# Patient Record
Sex: FEMALE | Race: WHITE | NOT HISPANIC OR LATINO | Employment: OTHER | URBAN - NONMETROPOLITAN AREA
[De-identification: names, ages, dates, MRNs, and addresses within clinical notes are randomized per-mention and may not be internally consistent; named-entity substitution may affect disease eponyms.]

---

## 2017-01-06 ENCOUNTER — CLINICAL SUPPORT (OUTPATIENT)
Dept: OBSTETRICS AND GYNECOLOGY | Facility: CLINIC | Age: 62
End: 2017-01-06

## 2017-01-06 DIAGNOSIS — M81.0 OSTEOPOROSIS: Primary | ICD-10-CM

## 2017-01-06 PROCEDURE — 96372 THER/PROPH/DIAG INJ SC/IM: CPT | Performed by: OBSTETRICS & GYNECOLOGY

## 2017-01-10 ENCOUNTER — OFFICE VISIT (OUTPATIENT)
Dept: OTOLARYNGOLOGY | Facility: CLINIC | Age: 62
End: 2017-01-10

## 2017-01-10 VITALS
BODY MASS INDEX: 32.78 KG/M2 | HEART RATE: 100 BPM | DIASTOLIC BLOOD PRESSURE: 78 MMHG | TEMPERATURE: 98.1 F | SYSTOLIC BLOOD PRESSURE: 127 MMHG | HEIGHT: 66 IN | WEIGHT: 204 LBS

## 2017-01-10 DIAGNOSIS — H90.5 SENSORINEURAL HEARING LOSS: ICD-10-CM

## 2017-01-10 DIAGNOSIS — H69.83 EUSTACHIAN TUBE DYSFUNCTION, BILATERAL: Primary | ICD-10-CM

## 2017-01-10 PROBLEM — H69.90 EUSTACHIAN TUBE DYSFUNCTION: Status: ACTIVE | Noted: 2017-01-10

## 2017-01-10 PROBLEM — H69.80 EUSTACHIAN TUBE DYSFUNCTION: Status: ACTIVE | Noted: 2017-01-10

## 2017-01-10 PROCEDURE — 99213 OFFICE O/P EST LOW 20 MIN: CPT | Performed by: NURSE PRACTITIONER

## 2017-01-10 RX ORDER — VALACYCLOVIR HYDROCHLORIDE 500 MG/1
500 TABLET, FILM COATED ORAL DAILY
COMMUNITY
Start: 2016-10-20 | End: 2018-08-07 | Stop reason: SDUPTHER

## 2017-01-10 RX ORDER — TRAMADOL HYDROCHLORIDE 50 MG/1
TABLET ORAL
COMMUNITY
End: 2018-01-10 | Stop reason: SDUPTHER

## 2017-01-10 RX ORDER — TICAGRELOR 90 MG/1
90 TABLET ORAL 2 TIMES DAILY
COMMUNITY
Start: 2016-10-26 | End: 2019-03-13 | Stop reason: ALTCHOICE

## 2017-01-10 RX ORDER — SALSALATE 500 MG
TABLET ORAL
COMMUNITY
End: 2018-01-22 | Stop reason: HOSPADM

## 2017-01-10 RX ORDER — PREDNISONE 1 MG/1
2 TABLET ORAL DAILY
COMMUNITY
Start: 2016-10-26 | End: 2018-01-22 | Stop reason: HOSPADM

## 2017-01-10 RX ORDER — NITROGLYCERIN 0.4 MG/1
0.4 TABLET SUBLINGUAL
COMMUNITY
Start: 2016-03-09 | End: 2021-03-26 | Stop reason: HOSPADM

## 2017-01-10 RX ORDER — ISOSORBIDE MONONITRATE 30 MG/1
30 TABLET, EXTENDED RELEASE ORAL DAILY
COMMUNITY
Start: 2016-12-21 | End: 2018-01-22 | Stop reason: HOSPADM

## 2017-01-10 RX ORDER — CETIRIZINE HYDROCHLORIDE 10 MG/1
TABLET ORAL
COMMUNITY
End: 2020-08-12

## 2017-01-10 RX ORDER — LEVOTHYROXINE SODIUM 0.07 MG/1
TABLET ORAL
COMMUNITY
Start: 2014-05-15 | End: 2019-12-27 | Stop reason: DRUGHIGH

## 2017-01-10 RX ORDER — FLUTICASONE PROPIONATE 50 MCG
2 SPRAY, SUSPENSION (ML) NASAL DAILY
Qty: 1 EACH | Refills: 11 | Status: SHIPPED | OUTPATIENT
Start: 2017-01-10 | End: 2019-12-11 | Stop reason: SDUPTHER

## 2017-01-10 RX ORDER — ADALIMUMAB 40MG/0.8ML
40 KIT SUBCUTANEOUS TAKE AS DIRECTED
COMMUNITY
Start: 2016-11-30 | End: 2020-06-25

## 2017-01-10 RX ORDER — EZETIMIBE 10 MG/1
10 TABLET ORAL DAILY
COMMUNITY
Start: 2016-12-07 | End: 2020-05-21

## 2017-01-10 RX ORDER — FLUTICASONE PROPIONATE 50 MCG
2 SPRAY, SUSPENSION (ML) NASAL DAILY
COMMUNITY
End: 2017-01-10 | Stop reason: SDUPTHER

## 2017-01-10 RX ORDER — PANTOPRAZOLE SODIUM 40 MG/1
40 TABLET, DELAYED RELEASE ORAL DAILY
COMMUNITY
Start: 2016-10-24 | End: 2020-05-14

## 2017-01-10 RX ORDER — FUROSEMIDE 40 MG/1
TABLET ORAL
Status: ON HOLD | COMMUNITY
End: 2021-02-11

## 2017-01-10 RX ORDER — CARVEDILOL 6.25 MG/1
TABLET ORAL
COMMUNITY
End: 2018-01-10

## 2017-01-10 RX ORDER — LEFLUNOMIDE 20 MG/1
TABLET ORAL
COMMUNITY
End: 2018-01-22 | Stop reason: HOSPADM

## 2017-01-10 RX ORDER — FOLIC ACID 1 MG/1
TABLET ORAL
COMMUNITY
End: 2020-05-07

## 2017-01-10 RX ORDER — POTASSIUM CHLORIDE 750 MG/1
TABLET, FILM COATED, EXTENDED RELEASE ORAL
COMMUNITY
End: 2018-01-10 | Stop reason: ALTCHOICE

## 2017-01-10 NOTE — PROGRESS NOTES
PRIMARY CARE PROVIDER: Davon Urrutia MD  REFERRING PROVIDER: No ref. provider found      Chief complaint follow-up myringotomy tubes    Navarro Shin is a 61 y.o. female who presents status post myringotomy tube insertion. The patient currently has had no related complaints. The patient denies pain, fever, change of hearing and otorrhea. She states she has been using Flonase daily as well as Zyrtec with significant relief. She currently has a right PE tube and denies any left ear complications.     Review of Systems  HENT: as per HPI  CONSTITUTIONAL: No fever, chills  GI: no nausea or vomiting    History  Past Medical History   Diagnosis Date   • Arthritis    • Asthma    • Chronic sinusitis    • Eustachian tube dysfunction    • Heart disease    • Herpes simplex    • Hypertension    • Laryngitis sicca    • Laryngitis, chronic    • Otorrhea    • Sensorineural hearing loss    • Sjogren's disease      Past Surgical History   Procedure Laterality Date   • Coronary angioplasty with stent placement     • Myringotomy w/ tubes  09/04/2014     Family History   Problem Relation Age of Onset   • Cancer Mother    • Heart disease Father      Social History   Substance Use Topics   • Smoking status: Never Smoker   • Smokeless tobacco: Never Used   • Alcohol use No       Current Outpatient Prescriptions:   •  aspirin 81 MG tablet, Take 81 mg by mouth daily., Disp: , Rfl:   •  BRILINTA 90 MG tablet tablet, , Disp: , Rfl:   •  carvedilol (COREG) 6.25 MG tablet, Take 6.25 mg by mouth 2 times daily (with meals), Disp: , Rfl:   •  cetirizine (zyrTEC) 10 MG tablet, Take 10 mg by mouth daily , Disp: , Rfl:   •  fluticasone (FLONASE) 50 MCG/ACT nasal spray, 2 sprays into each nostril Daily., Disp: 1 each, Rfl: 11  •  folic acid (FOLVITE) 1 MG tablet, Take 1 mg by mouth daily.  , Disp: , Rfl:   •  furosemide (LASIX) 40 MG tablet, Take 40 mg by mouth daily., Disp: , Rfl:   •  HUMIRA PEN 40 MG/0.8ML Pen-injector Kit, ,  Disp: , Rfl:   •  isosorbide mononitrate (IMDUR) 30 MG 24 hr tablet, , Disp: , Rfl:   •  leflunomide (ARAVA) 20 MG tablet, Take 20 mg by mouth daily.  , Disp: , Rfl:   •  levothyroxine (SYNTHROID, LEVOTHROID) 75 MCG tablet, Take 75 mcg by mouth daily., Disp: , Rfl:   •  LYRICA 50 MG capsule, , Disp: , Rfl:   •  MULTIPLE VITAMIN PO, Take 1 tablet by mouth daily.  , Disp: , Rfl:   •  nitroglycerin (NITROSTAT) 0.4 MG SL tablet, Place 1 tablet under the tongue every 5 minutes as needed for Chest pain, Disp: , Rfl:   •  pantoprazole (PROTONIX) 40 MG EC tablet, , Disp: , Rfl:   •  potassium chloride (K-DUR) 10 MEQ CR tablet, Take 30 mEq by mouth 2 times daily., Disp: , Rfl:   •  predniSONE (DELTASONE) 1 MG tablet, , Disp: , Rfl:   •  PROLIA 60 MG/ML solution syringe, INJECT 60MG SUBCUTANEOUSLY EVERY 6 MONTHS, Disp: 1 mL, Rfl: 0  •  salsalate (DISALCID) 500 MG tablet, Takes 2 TAB BID ON Tuesday, Thursday and Saturday and 3 TABS IN AM & 2 TABS AT NIGHT  on Monday, Wednesday, Friday and Sunday., Disp: , Rfl:   •  traMADol (ULTRAM) 50 MG tablet, Take 50 mg by mouth as needed for Pain., Disp: , Rfl:   •  valACYclovir (VALTREX) 500 MG tablet, , Disp: , Rfl:   •  ZETIA 10 MG tablet, , Disp: , Rfl:   Allergies:  Amoxicillin    Objective     Vital Signs  Temp:  [98.1 °F (36.7 °C)] 98.1 °F (36.7 °C)  Heart Rate:  [100] 100  BP: (127)/(78) 127/78    Physical Exam:  CONSTITUTIONAL: well nourished, well-developed, alert, oriented, in no acute distress   COMMUNICATION AND VOICE: able to communicate normally for age, normal voice quality  HEAD: normocephalic, no lesions, atraumatic, no tenderness, no masses   FACE: appearance normal, no lesions, no tenderness, no deformities, facial motion symmetric  EYES: ocular motility normal, eyelids normal, orbits normal, no proptosis, conjunctiva normal , pupils equal, round   EARS:  Hearing: response to conversational voice normal bilaterally   External Ears: auricles without lesions  Otoscopic:  ear canals normal, right myringotomy tube in place, dry and patent and with cerumen at the base of the tube; left ear canal normal, no lesion; TM intact with monomer no effusion  NOSE:  External Nose: structure normal, no tenderness on palpation, no nasal discharge, no lesions, no evidence of trauma, nostrils patent   ORAL:  Lips: upper and lower lips without lesion   NECK: neck appearance normal  CHEST/RESPIRATORY: respiratory effort normal, normal chest excursion  CARDIOVASCULAR: extremities without cyanosis or edema   NEUROLOGIC/PSYCHIATRIC: oriented appropriately, mood normal, affect appropriate, CN II-XII intact grossly    Assessment   1. Eustachian tube dysfunction, bilateral    2. Sensorineural hearing loss    s/p myringotomy tube insertion      Plan    Dry ear precautions. Continue use of nasal spray and Zyrtec.     I discussed the patients findings and my recommendations and answered questions. Follow-up sooner if needed.     Follow up in 6 months.    Jayla Jo, APRN   01/10/17  2:58 PM

## 2017-01-10 NOTE — MR AVS SNAPSHOT
Tawny Starkeyburn   1/10/2017 2:30 PM   Office Visit    Dept Phone:  [phone]   Encounter #:  [id number]    Provider:  DANIELA Mcgill   Department:  [facility]                Your Full Care Plan              Where to Get Your Medications      These medications were sent to [facility] - [place], [place] - [address] - [phone] [place] [phone] FX  [address], [facility] [address]     Phone:  [phone]     fluticasone 50 MCG/ACT nasal spray            Your Updated Medication List          This list is accurate as of: 1/10/17  4:55 PM.  Always use your most recent med list.                ARAVA 20 MG tablet   Generic drug:  leflunomide       aspirin 81 MG tablet       BRILINTA 90 MG tablet tablet   Generic drug:  ticagrelor       carvedilol 6.25 MG tablet   Commonly known as:  COREG       cetirizine 10 MG tablet   Commonly known as:  zyrTEC       fluticasone 50 MCG/ACT nasal spray   Commonly known as:  FLONASE   2 sprays into each nostril Daily.       folic acid 1 MG tablet   Commonly known as:  FOLVITE       HUMIRA PEN 40 MG/0.8ML Pen-injector Kit   Generic drug:  Adalimumab       isosorbide mononitrate 30 MG 24 hr tablet   Commonly known as:  IMDUR       LASIX 40 MG tablet   Generic drug:  furosemide       levothyroxine 75 MCG tablet   Commonly known as:  SYNTHROID, LEVOTHROID       LYRICA 50 MG capsule   Generic drug:  pregabalin       MULTIPLE VITAMIN PO       NITROSTAT 0.4 MG SL tablet   Generic drug:  nitroglycerin       pantoprazole 40 MG EC tablet   Commonly known as:  PROTONIX       potassium chloride 10 MEQ CR tablet   Commonly known as:  K-DUR       predniSONE 1 MG tablet   Commonly known as:  DELTASONE       PROLIA 60 MG/ML solution syringe   Generic drug:  denosumab   INJECT 60MG SUBCUTANEOUSLY EVERY 6 MONTHS       salsalate 500 MG tablet   Commonly known as:  DISALCID       traMADol 50 MG tablet    "  Commonly known as:  ULTRAM       valACYclovir 500 MG tablet   Commonly known as:  VALTREX       ZETIA 10 MG tablet   Generic drug:  ezetimibe               You Were Diagnosed With        Codes Comments    Eustachian tube dysfunction, bilateral    -  Primary ICD-10-CM: H69.83  ICD-9-CM: 381.81     Sensorineural hearing loss     ICD-10-CM: H90.5  ICD-9-CM: 389.10       Instructions     None    Patient Instructions History      Upcoming Appointments     Visit Type Date Time Department    FOLLOW UP 1/10/2017  2:30 PM [facility] [place]    FOLLOW UP 2017  9:30 AM [facility] Signup     [facility] [employer] allows you to send messages to your doctor, view your test results, renew your prescriptions, schedule appointments, and more. To sign up, go to [employer] and click on the Sign Up Now link in the New User? box. Enter your [employer] Activation Code exactly as it appears below along with the last four digits of your Social Security Number and your Date of Birth () to complete the sign-up process. If you do not sign up before the expiration date, you must request a new code.    [employer] Activation Code: [email or web address]  Expires: 2017  9:59 AM    If you have questions, you can email [email or web address] or call [phone] to talk to our [employer] staff. Remember, [employer] is NOT to be used for urgent needs. For medical emergencies, dial 911.               Other Info from Your Visit           Your Appointments     2017  9:30 AM CDT   Follow Up with DANIELA Mcgill   [facility] (--)    [address] [place] [address]   [address]  [facility] [address] [phone]           Arrive 15 minutes prior to appointment.              Allergies     Amoxicillin        Reason for Visit     Ear Problem 4 month ear follow up      Vital Signs     Blood Pressure Pulse Temperature Height Weight Body Mass Index    127/78 100 98.1 °F (36.7 °C) 66\" (167.6 cm) 204 " lb (92.5 kg) 32.93 kg/m2    Smoking Status                   Never Smoker           Problems and Diagnoses Noted     Eustachian tube dysfunction    Hearing disorder

## 2017-01-13 DIAGNOSIS — I25.10 CORONARY ARTERY DISEASE INVOLVING NATIVE HEART WITHOUT ANGINA PECTORIS, UNSPECIFIED VESSEL OR LESION TYPE: ICD-10-CM

## 2017-01-13 RX ORDER — TICAGRELOR 90 MG/1
TABLET ORAL
Qty: 180 TABLET | Refills: 3 | Status: SHIPPED | OUTPATIENT
Start: 2017-01-13 | End: 2018-02-08 | Stop reason: SDUPTHER

## 2017-02-28 DIAGNOSIS — I20.9 ANGINA PECTORIS (HCC): ICD-10-CM

## 2017-02-28 RX ORDER — ISOSORBIDE MONONITRATE 30 MG/1
TABLET, EXTENDED RELEASE ORAL
Qty: 90 TABLET | Refills: 3 | Status: ON HOLD | OUTPATIENT
Start: 2017-02-28 | End: 2018-01-22

## 2017-03-27 ENCOUNTER — TRANSCRIBE ORDERS (OUTPATIENT)
Dept: ADMINISTRATIVE | Facility: HOSPITAL | Age: 62
End: 2017-03-27

## 2017-03-27 ENCOUNTER — APPOINTMENT (OUTPATIENT)
Dept: LAB | Facility: HOSPITAL | Age: 62
End: 2017-03-27
Attending: INTERNAL MEDICINE

## 2017-03-27 DIAGNOSIS — I10 BENIGN HYPERTENSION: ICD-10-CM

## 2017-03-27 DIAGNOSIS — E78.5 HYPERLIPIDEMIA, UNSPECIFIED HYPERLIPIDEMIA TYPE: ICD-10-CM

## 2017-03-27 DIAGNOSIS — M81.0 OSTEOPOROSIS, UNSPECIFIED: Primary | ICD-10-CM

## 2017-03-27 DIAGNOSIS — Z79.899 LONG TERM USE OF DRUG: ICD-10-CM

## 2017-03-27 LAB
25(OH)D3 SERPL-MCNC: 27.9 NG/ML (ref 30–100)
ALBUMIN SERPL-MCNC: 3.4 G/DL (ref 3.5–5)
ALBUMIN/GLOB SERPL: 0.9 G/DL (ref 1.1–2.5)
ALP SERPL-CCNC: 81 U/L (ref 24–120)
ALT SERPL W P-5'-P-CCNC: 25 U/L (ref 0–54)
ANION GAP SERPL CALCULATED.3IONS-SCNC: 6 MMOL/L (ref 4–13)
ARTICHOKE IGE QN: 141 MG/DL (ref 0–99)
AST SERPL-CCNC: 40 U/L (ref 7–45)
BASOPHILS # BLD AUTO: 0.04 10*3/MM3 (ref 0–0.2)
BASOPHILS NFR BLD AUTO: 0.7 % (ref 0–2)
BILIRUB SERPL-MCNC: 0.5 MG/DL (ref 0.1–1)
BILIRUB UR QL STRIP: NEGATIVE
BUN BLD-MCNC: 12 MG/DL (ref 5–21)
BUN/CREAT SERPL: 15.4 (ref 7–25)
CALCIUM SPEC-SCNC: 8.9 MG/DL (ref 8.4–10.4)
CHLORIDE SERPL-SCNC: 104 MMOL/L (ref 98–110)
CHOLEST SERPL-MCNC: 221 MG/DL (ref 130–200)
CLARITY UR: CLEAR
CO2 SERPL-SCNC: 32 MMOL/L (ref 24–31)
COLOR UR: YELLOW
CREAT BLD-MCNC: 0.78 MG/DL (ref 0.5–1.4)
DEPRECATED RDW RBC AUTO: 55.7 FL (ref 40–54)
EOSINOPHIL # BLD AUTO: 0.29 10*3/MM3 (ref 0–0.7)
EOSINOPHIL NFR BLD AUTO: 5.1 % (ref 0–4)
ERYTHROCYTE [DISTWIDTH] IN BLOOD BY AUTOMATED COUNT: 16.5 % (ref 12–15)
GFR SERPL CREATININE-BSD FRML MDRD: 75 ML/MIN/1.73
GLOBULIN UR ELPH-MCNC: 3.6 GM/DL
GLUCOSE BLD-MCNC: 95 MG/DL (ref 70–100)
GLUCOSE UR STRIP-MCNC: NEGATIVE MG/DL
HCT VFR BLD AUTO: 40 % (ref 37–47)
HDLC SERPL-MCNC: 42 MG/DL
HGB BLD-MCNC: 12.7 G/DL (ref 12–16)
HGB UR QL STRIP.AUTO: NEGATIVE
IMM GRANULOCYTES # BLD: 0.01 10*3/MM3 (ref 0–0.03)
IMM GRANULOCYTES NFR BLD: 0.2 % (ref 0–5)
KETONES UR QL STRIP: NEGATIVE
LDLC/HDLC SERPL: 3.58 {RATIO}
LEUKOCYTE ESTERASE UR QL STRIP.AUTO: NEGATIVE
LYMPHOCYTES # BLD AUTO: 1.82 10*3/MM3 (ref 0.72–4.86)
LYMPHOCYTES NFR BLD AUTO: 32.2 % (ref 15–45)
MAGNESIUM SERPL-MCNC: 2 MG/DL (ref 1.4–2.2)
MCH RBC QN AUTO: 29.1 PG (ref 28–32)
MCHC RBC AUTO-ENTMCNC: 31.8 G/DL (ref 33–36)
MCV RBC AUTO: 91.7 FL (ref 82–98)
MONOCYTES # BLD AUTO: 0.79 10*3/MM3 (ref 0.19–1.3)
MONOCYTES NFR BLD AUTO: 14 % (ref 4–12)
NEUTROPHILS # BLD AUTO: 2.7 10*3/MM3 (ref 1.87–8.4)
NEUTROPHILS NFR BLD AUTO: 47.8 % (ref 39–78)
NITRITE UR QL STRIP: NEGATIVE
PH UR STRIP.AUTO: <=5 [PH] (ref 5–8)
PLATELET # BLD AUTO: 161 10*3/MM3 (ref 130–400)
PMV BLD AUTO: 10.3 FL (ref 6–12)
POTASSIUM BLD-SCNC: 4.2 MMOL/L (ref 3.5–5.3)
PROT SERPL-MCNC: 7 G/DL (ref 6.3–8.7)
PROT UR QL STRIP: NEGATIVE
RBC # BLD AUTO: 4.36 10*6/MM3 (ref 4.2–5.4)
SODIUM BLD-SCNC: 142 MMOL/L (ref 135–145)
SP GR UR STRIP: 1.01 (ref 1–1.03)
T4 FREE SERPL-MCNC: 1.4 NG/DL (ref 0.78–2.19)
TRIGL SERPL-MCNC: 143 MG/DL (ref 0–149)
TSH SERPL DL<=0.05 MIU/L-ACNC: 0.82 MIU/ML (ref 0.47–4.68)
URATE SERPL-MCNC: 3.6 MG/DL (ref 2.7–7.5)
UROBILINOGEN UR QL STRIP: NORMAL
WBC NRBC COR # BLD: 5.65 10*3/MM3 (ref 4.8–10.8)

## 2017-03-27 PROCEDURE — 83735 ASSAY OF MAGNESIUM: CPT | Performed by: INTERNAL MEDICINE

## 2017-03-27 PROCEDURE — 80061 LIPID PANEL: CPT | Performed by: INTERNAL MEDICINE

## 2017-03-27 PROCEDURE — 84443 ASSAY THYROID STIM HORMONE: CPT | Performed by: INTERNAL MEDICINE

## 2017-03-27 PROCEDURE — 80053 COMPREHEN METABOLIC PANEL: CPT | Performed by: INTERNAL MEDICINE

## 2017-03-27 PROCEDURE — 82306 VITAMIN D 25 HYDROXY: CPT | Performed by: INTERNAL MEDICINE

## 2017-03-27 PROCEDURE — 36415 COLL VENOUS BLD VENIPUNCTURE: CPT | Performed by: INTERNAL MEDICINE

## 2017-03-27 PROCEDURE — 81003 URINALYSIS AUTO W/O SCOPE: CPT | Performed by: INTERNAL MEDICINE

## 2017-03-27 PROCEDURE — 84439 ASSAY OF FREE THYROXINE: CPT | Performed by: INTERNAL MEDICINE

## 2017-03-27 PROCEDURE — 84550 ASSAY OF BLOOD/URIC ACID: CPT | Performed by: INTERNAL MEDICINE

## 2017-03-27 PROCEDURE — 85025 COMPLETE CBC W/AUTO DIFF WBC: CPT | Performed by: INTERNAL MEDICINE

## 2017-03-30 ENCOUNTER — OFFICE VISIT (OUTPATIENT)
Dept: CARDIOLOGY | Age: 62
End: 2017-03-30
Payer: MEDICARE

## 2017-03-30 VITALS
DIASTOLIC BLOOD PRESSURE: 66 MMHG | BODY MASS INDEX: 32.95 KG/M2 | HEIGHT: 66 IN | HEART RATE: 74 BPM | WEIGHT: 205 LBS | SYSTOLIC BLOOD PRESSURE: 116 MMHG

## 2017-03-30 DIAGNOSIS — R55 NEAR SYNCOPE: Primary | ICD-10-CM

## 2017-03-30 DIAGNOSIS — Z95.0 PACEMAKER: ICD-10-CM

## 2017-03-30 DIAGNOSIS — I49.5 SINUS NODE DYSFUNCTION (HCC): ICD-10-CM

## 2017-03-30 PROCEDURE — G8427 DOCREV CUR MEDS BY ELIG CLIN: HCPCS | Performed by: CLINICAL NURSE SPECIALIST

## 2017-03-30 PROCEDURE — 3014F SCREEN MAMMO DOC REV: CPT | Performed by: CLINICAL NURSE SPECIALIST

## 2017-03-30 PROCEDURE — G8598 ASA/ANTIPLAT THER USED: HCPCS | Performed by: CLINICAL NURSE SPECIALIST

## 2017-03-30 PROCEDURE — G8484 FLU IMMUNIZE NO ADMIN: HCPCS | Performed by: CLINICAL NURSE SPECIALIST

## 2017-03-30 PROCEDURE — G8417 CALC BMI ABV UP PARAM F/U: HCPCS | Performed by: CLINICAL NURSE SPECIALIST

## 2017-03-30 PROCEDURE — 99213 OFFICE O/P EST LOW 20 MIN: CPT | Performed by: CLINICAL NURSE SPECIALIST

## 2017-03-30 PROCEDURE — 93288 INTERROG EVL PM/LDLS PM IP: CPT | Performed by: CLINICAL NURSE SPECIALIST

## 2017-03-30 PROCEDURE — 1036F TOBACCO NON-USER: CPT | Performed by: CLINICAL NURSE SPECIALIST

## 2017-03-30 PROCEDURE — 3017F COLORECTAL CA SCREEN DOC REV: CPT | Performed by: CLINICAL NURSE SPECIALIST

## 2017-04-27 ENCOUNTER — TELEPHONE (OUTPATIENT)
Dept: NEUROLOGY | Facility: CLINIC | Age: 62
End: 2017-04-27

## 2017-04-27 DIAGNOSIS — M54.50 LOW BACK PAIN RADIATING TO BOTH LEGS: Primary | ICD-10-CM

## 2017-04-27 DIAGNOSIS — M79.604 LOW BACK PAIN RADIATING TO BOTH LEGS: Primary | ICD-10-CM

## 2017-04-27 DIAGNOSIS — M79.605 LOW BACK PAIN RADIATING TO BOTH LEGS: Primary | ICD-10-CM

## 2017-04-28 ENCOUNTER — HOSPITAL ENCOUNTER (OUTPATIENT)
Dept: MRI IMAGING | Facility: HOSPITAL | Age: 62
Discharge: HOME | End: 2017-04-28
Attending: PSYCHIATRY & NEUROLOGY | Admitting: PSYCHIATRY & NEUROLOGY

## 2017-04-28 DIAGNOSIS — M79.605 LOW BACK PAIN RADIATING TO BOTH LEGS: ICD-10-CM

## 2017-04-28 DIAGNOSIS — M54.50 LOW BACK PAIN RADIATING TO BOTH LEGS: ICD-10-CM

## 2017-04-28 DIAGNOSIS — M79.604 LOW BACK PAIN RADIATING TO BOTH LEGS: ICD-10-CM

## 2017-04-28 PROCEDURE — 72148 MRI LUMBAR SPINE W/O DYE: CPT

## 2017-04-28 NOTE — TELEPHONE ENCOUNTER
I was able to reach Mrs. Shin and confirmed her insurance. She would like to get her MRI done today if possible.

## 2017-07-13 DIAGNOSIS — I10 ESSENTIAL HYPERTENSION: ICD-10-CM

## 2017-07-13 RX ORDER — CARVEDILOL 12.5 MG/1
TABLET ORAL
Qty: 180 TABLET | Refills: 3 | Status: SHIPPED | OUTPATIENT
Start: 2017-07-13 | End: 2018-05-02 | Stop reason: SDUPTHER

## 2017-07-31 ENCOUNTER — TELEPHONE (OUTPATIENT)
Dept: CARDIOLOGY | Age: 62
End: 2017-07-31

## 2017-08-02 ENCOUNTER — OFFICE VISIT (OUTPATIENT)
Dept: CARDIOLOGY | Age: 62
End: 2017-08-02
Payer: MEDICARE

## 2017-08-02 VITALS
BODY MASS INDEX: 33.11 KG/M2 | DIASTOLIC BLOOD PRESSURE: 62 MMHG | SYSTOLIC BLOOD PRESSURE: 110 MMHG | HEIGHT: 66 IN | HEART RATE: 72 BPM | WEIGHT: 206 LBS

## 2017-08-02 DIAGNOSIS — I25.10 ASHD (ARTERIOSCLEROTIC HEART DISEASE): ICD-10-CM

## 2017-08-02 DIAGNOSIS — Z95.0 PACEMAKER: ICD-10-CM

## 2017-08-02 DIAGNOSIS — R07.9 CHEST PAIN IN ADULT: Primary | ICD-10-CM

## 2017-08-02 DIAGNOSIS — R06.02 SHORTNESS OF BREATH: ICD-10-CM

## 2017-08-02 DIAGNOSIS — R12 HEARTBURN: ICD-10-CM

## 2017-08-02 DIAGNOSIS — I49.5 SINUS NODE DYSFUNCTION (HCC): ICD-10-CM

## 2017-08-02 PROCEDURE — 99214 OFFICE O/P EST MOD 30 MIN: CPT | Performed by: INTERNAL MEDICINE

## 2017-08-02 PROCEDURE — 3014F SCREEN MAMMO DOC REV: CPT | Performed by: INTERNAL MEDICINE

## 2017-08-02 PROCEDURE — 93280 PM DEVICE PROGR EVAL DUAL: CPT | Performed by: INTERNAL MEDICINE

## 2017-08-02 PROCEDURE — G8598 ASA/ANTIPLAT THER USED: HCPCS | Performed by: INTERNAL MEDICINE

## 2017-08-02 PROCEDURE — 3017F COLORECTAL CA SCREEN DOC REV: CPT | Performed by: INTERNAL MEDICINE

## 2017-08-02 PROCEDURE — G8417 CALC BMI ABV UP PARAM F/U: HCPCS | Performed by: INTERNAL MEDICINE

## 2017-08-02 PROCEDURE — 1036F TOBACCO NON-USER: CPT | Performed by: INTERNAL MEDICINE

## 2017-08-02 PROCEDURE — G8427 DOCREV CUR MEDS BY ELIG CLIN: HCPCS | Performed by: INTERNAL MEDICINE

## 2017-08-02 RX ORDER — CLONIDINE HYDROCHLORIDE 0.1 MG/1
0.1 TABLET ORAL 2 TIMES DAILY PRN
Status: ON HOLD | COMMUNITY
Start: 2017-06-29 | End: 2021-04-12 | Stop reason: HOSPADM

## 2017-08-02 RX ORDER — GABAPENTIN 600 MG/1
TABLET ORAL
Status: ON HOLD | COMMUNITY
Start: 2017-05-24 | End: 2018-01-22

## 2017-08-15 ENCOUNTER — CLINICAL DOCUMENTATION (OUTPATIENT)
Dept: CARDIOLOGY | Age: 62
End: 2017-08-15

## 2017-08-15 ENCOUNTER — HOSPITAL ENCOUNTER (OUTPATIENT)
Dept: NUCLEAR MEDICINE | Age: 62
Discharge: HOME | End: 2017-08-15
Payer: MEDICARE

## 2017-08-15 ENCOUNTER — HOSPITAL ENCOUNTER (OUTPATIENT)
Dept: NON INVASIVE DIAGNOSTICS | Age: 62
Discharge: HOME | End: 2017-08-15
Payer: MEDICARE

## 2017-08-15 DIAGNOSIS — R12 HEARTBURN: ICD-10-CM

## 2017-08-15 DIAGNOSIS — I25.10 ASHD (ARTERIOSCLEROTIC HEART DISEASE): ICD-10-CM

## 2017-08-15 DIAGNOSIS — R07.9 CHEST PAIN IN ADULT: ICD-10-CM

## 2017-08-15 DIAGNOSIS — R06.02 SHORTNESS OF BREATH: ICD-10-CM

## 2017-08-15 PROCEDURE — 93017 CV STRESS TEST TRACING ONLY: CPT

## 2017-08-15 PROCEDURE — 3430000000 HC RX DIAGNOSTIC RADIOPHARMACEUTICAL: Performed by: INTERNAL MEDICINE

## 2017-08-15 PROCEDURE — 6360000002 HC RX W HCPCS: Performed by: INTERNAL MEDICINE

## 2017-08-15 PROCEDURE — 78452 HT MUSCLE IMAGE SPECT MULT: CPT

## 2017-08-15 PROCEDURE — A9500 TC99M SESTAMIBI: HCPCS | Performed by: INTERNAL MEDICINE

## 2017-08-15 RX ADMIN — TETRAKIS(2-METHOXYISOBUTYLISOCYANIDE)COPPER(I) TETRAFLUOROBORATE 10 MILLICURIE: 1 INJECTION, POWDER, LYOPHILIZED, FOR SOLUTION INTRAVENOUS at 10:38

## 2017-08-15 RX ADMIN — TETRAKIS(2-METHOXYISOBUTYLISOCYANIDE)COPPER(I) TETRAFLUOROBORATE 30 MILLICURIE: 1 INJECTION, POWDER, LYOPHILIZED, FOR SOLUTION INTRAVENOUS at 10:39

## 2017-08-15 RX ADMIN — REGADENOSON 0.4 MG: 0.08 INJECTION, SOLUTION INTRAVENOUS at 10:39

## 2017-09-25 ENCOUNTER — TRANSCRIBE ORDERS (OUTPATIENT)
Dept: ADMINISTRATIVE | Facility: HOSPITAL | Age: 62
End: 2017-09-25

## 2017-09-25 ENCOUNTER — APPOINTMENT (OUTPATIENT)
Dept: LAB | Facility: HOSPITAL | Age: 62
End: 2017-09-25
Attending: INTERNAL MEDICINE

## 2017-09-25 DIAGNOSIS — E78.5 HYPERLIPIDEMIA, UNSPECIFIED HYPERLIPIDEMIA TYPE: ICD-10-CM

## 2017-09-25 DIAGNOSIS — Z11.59 NEED FOR HEPATITIS C SCREENING TEST: ICD-10-CM

## 2017-09-25 DIAGNOSIS — I10 BENIGN HYPERTENSION: Primary | ICD-10-CM

## 2017-09-25 LAB
ANION GAP SERPL CALCULATED.3IONS-SCNC: 7 MMOL/L (ref 4–13)
ARTICHOKE IGE QN: 159 MG/DL (ref 0–99)
AST SERPL-CCNC: 34 U/L (ref 7–45)
BUN BLD-MCNC: 17 MG/DL (ref 5–21)
BUN/CREAT SERPL: 22.7 (ref 7–25)
CALCIUM SPEC-SCNC: 9 MG/DL (ref 8.4–10.4)
CHLORIDE SERPL-SCNC: 105 MMOL/L (ref 98–110)
CHOLEST SERPL-MCNC: 216 MG/DL (ref 130–200)
CO2 SERPL-SCNC: 28 MMOL/L (ref 24–31)
CREAT BLD-MCNC: 0.75 MG/DL (ref 0.5–1.4)
GFR SERPL CREATININE-BSD FRML MDRD: 78 ML/MIN/1.73
GLUCOSE BLD-MCNC: 91 MG/DL (ref 70–100)
HCV AB SER DONR QL: NEGATIVE
HCV S/C RATIO: 0.03 (ref 0–0.99)
HDLC SERPL-MCNC: 48 MG/DL
LDLC/HDLC SERPL: 3 {RATIO}
POTASSIUM BLD-SCNC: 3.4 MMOL/L (ref 3.5–5.3)
SODIUM BLD-SCNC: 140 MMOL/L (ref 135–145)
TRIGL SERPL-MCNC: 120 MG/DL (ref 0–149)

## 2017-09-25 PROCEDURE — 86803 HEPATITIS C AB TEST: CPT | Performed by: INTERNAL MEDICINE

## 2017-09-25 PROCEDURE — 84450 TRANSFERASE (AST) (SGOT): CPT | Performed by: INTERNAL MEDICINE

## 2017-09-25 PROCEDURE — 80048 BASIC METABOLIC PNL TOTAL CA: CPT | Performed by: INTERNAL MEDICINE

## 2017-09-25 PROCEDURE — 36415 COLL VENOUS BLD VENIPUNCTURE: CPT

## 2017-09-25 PROCEDURE — 80061 LIPID PANEL: CPT | Performed by: INTERNAL MEDICINE

## 2017-10-25 ENCOUNTER — OFFICE VISIT (OUTPATIENT)
Dept: OBSTETRICS AND GYNECOLOGY | Facility: CLINIC | Age: 62
End: 2017-10-25

## 2017-10-25 VITALS
BODY MASS INDEX: 32.78 KG/M2 | WEIGHT: 204 LBS | DIASTOLIC BLOOD PRESSURE: 70 MMHG | HEIGHT: 66 IN | SYSTOLIC BLOOD PRESSURE: 128 MMHG

## 2017-10-25 DIAGNOSIS — Z01.411 ENCOUNTER FOR GYNECOLOGICAL EXAMINATION WITH ABNORMAL FINDING: ICD-10-CM

## 2017-10-25 DIAGNOSIS — N89.8 VAGINAL DRYNESS: Primary | ICD-10-CM

## 2017-10-25 DIAGNOSIS — M81.0 OSTEOPOROSIS, UNSPECIFIED OSTEOPOROSIS TYPE, UNSPECIFIED PATHOLOGICAL FRACTURE PRESENCE: ICD-10-CM

## 2017-10-25 DIAGNOSIS — Z12.31 ENCOUNTER FOR SCREENING MAMMOGRAM FOR BREAST CANCER: ICD-10-CM

## 2017-10-25 DIAGNOSIS — Z78.0 MENOPAUSE: ICD-10-CM

## 2017-10-25 PROCEDURE — 96372 THER/PROPH/DIAG INJ SC/IM: CPT | Performed by: OBSTETRICS & GYNECOLOGY

## 2017-10-25 PROCEDURE — G0101 CA SCREEN;PELVIC/BREAST EXAM: HCPCS | Performed by: OBSTETRICS & GYNECOLOGY

## 2017-10-25 RX ORDER — AMITRIPTYLINE HYDROCHLORIDE 25 MG/1
25 TABLET, FILM COATED ORAL NIGHTLY
Qty: 30 TABLET | Refills: 11 | Status: SHIPPED | OUTPATIENT
Start: 2017-10-25 | End: 2018-01-10

## 2017-10-25 NOTE — PROGRESS NOTES
Subjective   Chief Complaint   Patient presents with   • Annual Exam     pt here today for annual exam and prolia injection. pt voices no other concerns.      Tawny Shin is a 62 y.o. year old No obstetric history on file. menopausal female presenting to be seen for her annual exam.  The patient denies any vaginal bleeding in the past 12 months. Hot flashes and night sweats ARE a significant problem again, after having about 5 years that she did not experience these symptoms.  Her biggest complaint is back pain; Dr. Oropeza is evaluating her and they may plan surgery.     SEXUAL Hx:  She is sexually active.  In the past year there has not been new sexual partners.  She does report dryness and pain with intercourse.  She has never taken HRT because cardiology did not want her to after she had cardiac stents placed.    HEALTH Hx:  She exercises regularly: no.  She is very limited by back pain.    The patient reports regular self breast exams: no  She has noticed changes in height: yes.    No Additional Complaints Reported    The following portions of the patient's history were reviewed and updated as appropriate:problem list, current medications, allergies, past family history, past medical history, past social history and past surgical history.    Smoking status: Never Smoker                                                              Smokeless status: Never Used                        Review of Systems   Eyes: Positive for visual disturbance (wears glasses).   Respiratory: Negative for shortness of breath.    Cardiovascular: Negative for chest pain.   Gastrointestinal: Positive for diarrhea (side-effect of several medications patient takes). Negative for anal bleeding and blood in stool.   Endocrine: Positive for heat intolerance (hot flashes and night sweats).   Genitourinary: Positive for dyspareunia. Negative for difficulty urinating, enuresis, pelvic pain, vaginal bleeding and vaginal discharge.        Pt c/o  "odor for past 1-2 months   Musculoskeletal: Positive for arthralgias and back pain.   Skin: Negative for rash.   Neurological: Positive for dizziness.   Psychiatric/Behavioral: Positive for sleep disturbance (due to back pain).         Objective   /70  Ht 66\" (167.6 cm)  Wt 204 lb (92.5 kg)  BMI 32.93 kg/m2  Physical Exam   Constitutional: She is oriented to person, place, and time. She appears well-developed and well-nourished. No distress.   HENT:   Head: Normocephalic and atraumatic.   Eyes: EOM are normal.   Neck: Normal range of motion. Neck supple. No thyromegaly present.   Cardiovascular: Normal rate and regular rhythm.    No murmur heard.  Pulmonary/Chest: Effort normal and breath sounds normal. Right breast exhibits no inverted nipple and no mass. Left breast exhibits no inverted nipple and no mass.   Abdominal: Soft. She exhibits no distension. There is no tenderness.   Genitourinary: Vagina normal and uterus normal. Rectal exam shows anal tone normal. No breast tenderness or discharge. Pelvic exam was performed with patient supine. There is no tenderness or lesion on the right labia. There is no tenderness or lesion on the left labia. Cervix exhibits no motion tenderness and no discharge. Right adnexum displays no tenderness and no fullness. Left adnexum displays no tenderness and no fullness. No bleeding in the vagina. No vaginal discharge found.   Genitourinary Comments: Labia normal, no lesions noted.  Urethral meatus unremarkable, no prolapse of mucosa.  Anus/perineum normal.   Musculoskeletal: Normal range of motion. She exhibits no edema.   Neurological: She is alert and oriented to person, place, and time.   Skin: Skin is warm and dry.   Psychiatric: She has a normal mood and affect. Her behavior is normal. Judgment normal.   Nursing note and vitals reviewed.           Assessment & Plan    Tawny was seen today for annual exam.    Diagnoses and all orders for this visit:    Vaginal dryness: " Discussed Premarin vaginal cream.  Patient given instructions for use.    Encounter for gynecological examination with abnormal finding: Vaginal mucosa pale and smooth.  SBE encouraged.  Patient does screening labs with PCP.  Mammogram ordered.  Bone density done in 2016.      Menopause: Patient advised not to take HRT by her cardiologist; will try elavil at night to help with both vasomotor symptoms and insomnia    Encounter for screening mammogram for breast cancer  -     Mammo Screening Digital Tomosynthesis Bilateral With CAD; Future    Osteoporosis, unspecified osteoporosis type, unspecified pathological fracture presence  -     denosumab (PROLIA) syringe 60 mg; Inject 1 mL under the skin 1 (One) Time.    Other orders  -     amitriptyline (ELAVIL) 25 MG tablet; Take 1 tablet by mouth Every Night.  -     conjugated estrogens (PREMARIN) 0.625 MG/GM vaginal cream; Insert  into the vagina As Needed (vaginal dryness). 1/2 gram two nights weekly      This note was electronically signed.    Candelaria David MD  10/25/2017  10:49 AM

## 2017-11-02 ENCOUNTER — OFFICE VISIT (OUTPATIENT)
Dept: CARDIOLOGY | Age: 62
End: 2017-11-02
Payer: MEDICARE

## 2017-11-02 VITALS
HEART RATE: 70 BPM | BODY MASS INDEX: 33.59 KG/M2 | WEIGHT: 209 LBS | DIASTOLIC BLOOD PRESSURE: 72 MMHG | HEIGHT: 66 IN | SYSTOLIC BLOOD PRESSURE: 138 MMHG

## 2017-11-02 DIAGNOSIS — I25.10 ATHEROSCLEROTIC CARDIOVASCULAR DISEASE: Primary | ICD-10-CM

## 2017-11-02 DIAGNOSIS — Z95.0 PACEMAKER: ICD-10-CM

## 2017-11-02 DIAGNOSIS — I49.5 SINUS NODE DYSFUNCTION (HCC): ICD-10-CM

## 2017-11-02 PROCEDURE — 93280 PM DEVICE PROGR EVAL DUAL: CPT | Performed by: INTERNAL MEDICINE

## 2017-11-02 PROCEDURE — 3014F SCREEN MAMMO DOC REV: CPT | Performed by: INTERNAL MEDICINE

## 2017-11-02 PROCEDURE — G8417 CALC BMI ABV UP PARAM F/U: HCPCS | Performed by: INTERNAL MEDICINE

## 2017-11-02 PROCEDURE — 99214 OFFICE O/P EST MOD 30 MIN: CPT | Performed by: INTERNAL MEDICINE

## 2017-11-02 PROCEDURE — 1036F TOBACCO NON-USER: CPT | Performed by: INTERNAL MEDICINE

## 2017-11-02 PROCEDURE — G8484 FLU IMMUNIZE NO ADMIN: HCPCS | Performed by: INTERNAL MEDICINE

## 2017-11-02 PROCEDURE — G8427 DOCREV CUR MEDS BY ELIG CLIN: HCPCS | Performed by: INTERNAL MEDICINE

## 2017-11-02 PROCEDURE — G8598 ASA/ANTIPLAT THER USED: HCPCS | Performed by: INTERNAL MEDICINE

## 2017-11-02 PROCEDURE — 3017F COLORECTAL CA SCREEN DOC REV: CPT | Performed by: INTERNAL MEDICINE

## 2017-11-03 NOTE — PROGRESS NOTES
[facility]  [facility], [address] [place] [address], [facility]  The following was transcribed by Radha Painter M.T.     Dieudonne Brewer : 1955, 57 y.o. Female        Office Visit:  2017    Chief Complaint   Patient presents with    3 Month Follow-Up     3-month follow up with pacemaker check today. HISTORY OF PRESENT ILLNESS  Ms. Dieudonne Brewer is seen here for     This is a routine follow up. Patient Active Problem List   Diagnosis Code    CAD (coronary artery disease) I25.10    Hypertension I10    Rheumatoid arteritis I00    History of pulmonary fibrosis Z87.09    History of DVT (deep vein thrombosis) Z86.718    Thyroid disease E07.9    Intrinsic asthma J45.909    Hypercholesterolemia E78.00    History of neutropenia Z86.2    Syncope, cardiogenic R55    Near syncope R55    Status post placement of implantable loop recorder Z95.818    Left carotid bruit R09.89    S/P coronary artery stent placement Z95.5    Chest pain R07.9    Pacemaker Z95.0    Sinus node dysfunction (HCC) I49.5     Past Medical History:   Diagnosis Date    CAD (coronary artery disease)     S/p Stent to right coronary artery 2010. S/p myocardial infarction May 2013.  Cellulitis     LT LEG IN PAST    History of blood transfusion     History of DVT (deep vein thrombosis)     LT    History of neutropenia     History of pulmonary fibrosis     Hx of blood clots     LT LEG    Hypercholesterolemia     Hypertension     Intrinsic asthma     Pacemaker 2016    Rheumatoid arteritis     Sinus node dysfunction (HCC)     Staph aureus infection     Status post placement of implantable loop recorder 2/13/15, 3/30/15    2/19/15  removed, infection    Syncope 2015    Thyroid disease     HYPOTHYROIDISM     Past Surgical History:   Procedure Laterality Date    CARDIAC CATHETERIZATION  14  MDL    normal LV function.  EF 60%    CARDIAC as noted in the HPI, all other systems are negative. PHYSICAL EXAMINATION  GENERAL:  Alert and oriented x3 in no apparent distress. Short-term and long-term memory intact. Judgment intact. Oriented to time, place and person. No depression, anxiety or agitation. Vital Signs:  /72   Pulse 70   Ht 5' 6\" (1.676 m)   Wt 209 lb (94.8 kg)   BMI 33.73 kg/m²    HEAD:  Normocephalic without evidence of old or recent trauma. EYES:  Sclerae clear. Conjunctivae pink. Pupils equal and round. NOSE:  Negative nasal discharge or epistaxis. THROAT:  No lesions on lips or buccal mucosa. NECK:  Supple without mass or JVD. Carotid pulses 2+ to palpation bilaterally without bruit. No thyromegaly noted. CHEST:  Equal bilateral expansion. RESPIRATORY:  The lungs are clear to auscultation. Normal respiratory effort. CARDIOVASCULAR: The heart demonstrates regular rhythm with a normal rate. No audible murmurs or gallops. ABDOMEN:  Soft, nontender. Exhibits no distension. Bowel sounds are normal.   UPPER EXTREMITY EVALUATION:  Radial pulses palpable bilaterally. No cyanosis, clubbing or edema. LOWER EXTREMITY EVALUATION:  Femoral, popliteal, dorsalis pedis, and posterior tibialis pulses 2+ to palpation bilaterally. No cyanosis, clubbing, or peripheral edema. SKIN:  Warm and dry. MUSCULOSKELETAL:  Normal muscle strength and tone. SKIN:  Warm, dry. NEUROLOGIC:  Cranial nerves II through XII are grossly intact. IMPRESSION / PLAN  1. Coronary artery disease without angina. 2.  Pacemaker reviewed with appropriate sensing and capture. 3.  Continue same medications. 4.  Return for wellness visit in six months.             __________________________________  Luis Alfredo Monique. Shadia Jiménez M.D., Ph.D., F.A.C.C. [facility]    cc (pcp):  Janna Pace MD

## 2017-11-06 ENCOUNTER — HOSPITAL ENCOUNTER (OUTPATIENT)
Dept: MAMMOGRAPHY | Facility: HOSPITAL | Age: 62
Discharge: HOME | End: 2017-11-06
Attending: OBSTETRICS & GYNECOLOGY | Admitting: OBSTETRICS & GYNECOLOGY

## 2017-11-06 DIAGNOSIS — Z12.31 ENCOUNTER FOR SCREENING MAMMOGRAM FOR BREAST CANCER: ICD-10-CM

## 2017-11-06 PROCEDURE — 77063 BREAST TOMOSYNTHESIS BI: CPT

## 2017-11-06 PROCEDURE — G0202 SCR MAMMO BI INCL CAD: HCPCS

## 2017-11-15 ENCOUNTER — TRANSCRIBE ORDERS (OUTPATIENT)
Dept: ADMINISTRATIVE | Facility: HOSPITAL | Age: 62
End: 2017-11-15

## 2017-11-15 ENCOUNTER — APPOINTMENT (OUTPATIENT)
Dept: LAB | Facility: HOSPITAL | Age: 62
End: 2017-11-15
Attending: INTERNAL MEDICINE

## 2017-11-15 DIAGNOSIS — I10 BENIGN HYPERTENSION: Primary | ICD-10-CM

## 2017-11-15 LAB
ANION GAP SERPL CALCULATED.3IONS-SCNC: 4 MMOL/L (ref 4–13)
BUN BLD-MCNC: 15 MG/DL (ref 5–21)
BUN/CREAT SERPL: 21.4 (ref 7–25)
CALCIUM SPEC-SCNC: 9 MG/DL (ref 8.4–10.4)
CHLORIDE SERPL-SCNC: 107 MMOL/L (ref 98–110)
CO2 SERPL-SCNC: 30 MMOL/L (ref 24–31)
CREAT BLD-MCNC: 0.7 MG/DL (ref 0.5–1.4)
GFR SERPL CREATININE-BSD FRML MDRD: 84 ML/MIN/1.73
GLUCOSE BLD-MCNC: 87 MG/DL (ref 70–100)
POTASSIUM BLD-SCNC: 3.6 MMOL/L (ref 3.5–5.3)
SODIUM BLD-SCNC: 141 MMOL/L (ref 135–145)

## 2017-11-15 PROCEDURE — 80048 BASIC METABOLIC PNL TOTAL CA: CPT | Performed by: INTERNAL MEDICINE

## 2017-11-15 PROCEDURE — 36415 COLL VENOUS BLD VENIPUNCTURE: CPT

## 2017-12-12 ENCOUNTER — HOSPITAL ENCOUNTER (OUTPATIENT)
Facility: HOSPITAL | Age: 62
Setting detail: SURGERY ADMIT
End: 2017-12-12

## 2017-12-12 ENCOUNTER — TELEPHONE (OUTPATIENT)
Dept: CARDIOLOGY | Age: 62
End: 2017-12-12

## 2017-12-12 NOTE — TELEPHONE ENCOUNTER
Date: 1-17-18    Cardiologist:     Procedure: Eve Navarro    Surgeon: Dr. Bandar Pritchard Visit: 11-2-17  Reason for office visit and medical concerns addressed at this office visit: CAD, pacemaker    Testing Performed and Date of Service: stress test 8-23-17    Current Medications: Brilanta, ASA 81 mg, Neurontin, Catapres, Coreg, Prolia, Humira, Coreg, Lyrica, Ultram, Synthroid, Protonix, Zetia, Deltasone, Arava, lasix    Is the patient currently taking an anticoagulant? If so, what is the diagnosis the patient has been given to warrant the need for the anticoagulant?  Brilanta, ASA    Additional Notes: needs clearance, hold Brilanta

## 2017-12-13 ENCOUNTER — TELEPHONE (OUTPATIENT)
Dept: CARDIOLOGY | Age: 62
End: 2017-12-13

## 2017-12-13 NOTE — TELEPHONE ENCOUNTER
Sent negative Manju scan for ischemia. No stenting within the last year. May hold Brilinta.  Can do risk stratification letter

## 2018-01-02 NOTE — TELEPHONE ENCOUNTER
Patient called to ask how long to hold brilinta. Advised per note, patient is to hold for 7 days prior to surgery. Patient also asked when next carelink is scheduled. Advised per last office note 2/2/18. Understanding voiced.

## 2018-01-10 ENCOUNTER — HOSPITAL ENCOUNTER (OUTPATIENT)
Dept: GENERAL RADIOLOGY | Facility: HOSPITAL | Age: 63
Discharge: HOME | End: 2018-01-10

## 2018-01-10 ENCOUNTER — APPOINTMENT (OUTPATIENT)
Dept: PREADMISSION TESTING | Facility: HOSPITAL | Age: 63
End: 2018-01-10

## 2018-01-10 VITALS
SYSTOLIC BLOOD PRESSURE: 137 MMHG | RESPIRATION RATE: 16 BRPM | HEART RATE: 87 BPM | WEIGHT: 206.79 LBS | OXYGEN SATURATION: 95 % | DIASTOLIC BLOOD PRESSURE: 69 MMHG | HEIGHT: 65 IN | BODY MASS INDEX: 34.45 KG/M2

## 2018-01-10 LAB
ALBUMIN SERPL-MCNC: 3.5 G/DL (ref 3.5–5)
ALBUMIN/GLOB SERPL: 1.1 G/DL (ref 1.1–2.5)
ALP SERPL-CCNC: 63 U/L (ref 24–120)
ALT SERPL W P-5'-P-CCNC: 27 U/L (ref 0–54)
ANION GAP SERPL CALCULATED.3IONS-SCNC: 7 MMOL/L (ref 4–13)
APTT PPP: 24.2 SECONDS (ref 24.1–34.8)
AST SERPL-CCNC: 30 U/L (ref 7–45)
BACTERIA UR QL AUTO: ABNORMAL /HPF
BILIRUB SERPL-MCNC: 0.8 MG/DL (ref 0.1–1)
BILIRUB UR QL STRIP: ABNORMAL
BUN BLD-MCNC: 19 MG/DL (ref 5–21)
BUN/CREAT SERPL: 28.4 (ref 7–25)
CALCIUM SPEC-SCNC: 8.6 MG/DL (ref 8.4–10.4)
CHLORIDE SERPL-SCNC: 107 MMOL/L (ref 98–110)
CLARITY UR: ABNORMAL
CO2 SERPL-SCNC: 28 MMOL/L (ref 24–31)
COLOR UR: ABNORMAL
CREAT BLD-MCNC: 0.67 MG/DL (ref 0.5–1.4)
GFR SERPL CREATININE-BSD FRML MDRD: 89 ML/MIN/1.73
GLOBULIN UR ELPH-MCNC: 3.3 GM/DL
GLUCOSE BLD-MCNC: 96 MG/DL (ref 70–100)
GLUCOSE UR STRIP-MCNC: NEGATIVE MG/DL
HGB UR QL STRIP.AUTO: NEGATIVE
HYALINE CASTS UR QL AUTO: ABNORMAL /LPF
INR PPP: 0.97 (ref 0.91–1.09)
KETONES UR QL STRIP: ABNORMAL
LEUKOCYTE ESTERASE UR QL STRIP.AUTO: ABNORMAL
MUCOUS THREADS URNS QL MICRO: ABNORMAL /HPF
NITRITE UR QL STRIP: NEGATIVE
PH UR STRIP.AUTO: 6 [PH] (ref 5–8)
POTASSIUM BLD-SCNC: 3.6 MMOL/L (ref 3.5–5.3)
PROT SERPL-MCNC: 6.8 G/DL (ref 6.3–8.7)
PROT UR QL STRIP: ABNORMAL
PROTHROMBIN TIME: 13.2 SECONDS (ref 11.9–14.6)
RBC # UR: ABNORMAL /HPF
REF LAB TEST METHOD: ABNORMAL
SODIUM BLD-SCNC: 142 MMOL/L (ref 135–145)
SP GR UR STRIP: >1.03 (ref 1–1.03)
SQUAMOUS #/AREA URNS HPF: ABNORMAL /HPF
UROBILINOGEN UR QL STRIP: ABNORMAL
WBC UR QL AUTO: ABNORMAL /HPF

## 2018-01-10 PROCEDURE — 85610 PROTHROMBIN TIME: CPT

## 2018-01-10 PROCEDURE — 93005 ELECTROCARDIOGRAM TRACING: CPT

## 2018-01-10 PROCEDURE — 71046 X-RAY EXAM CHEST 2 VIEWS: CPT

## 2018-01-10 PROCEDURE — 80053 COMPREHEN METABOLIC PANEL: CPT

## 2018-01-10 PROCEDURE — 93010 ELECTROCARDIOGRAM REPORT: CPT | Performed by: INTERNAL MEDICINE

## 2018-01-10 PROCEDURE — 85730 THROMBOPLASTIN TIME PARTIAL: CPT

## 2018-01-10 PROCEDURE — 81001 URINALYSIS AUTO W/SCOPE: CPT

## 2018-01-10 PROCEDURE — 87086 URINE CULTURE/COLONY COUNT: CPT

## 2018-01-10 RX ORDER — OSELTAMIVIR PHOSPHATE 75 MG/1
75 CAPSULE ORAL DAILY
COMMUNITY
Start: 2018-01-09 | End: 2018-01-22 | Stop reason: HOSPADM

## 2018-01-10 RX ORDER — ERGOCALCIFEROL 1.25 MG/1
50000 CAPSULE ORAL
COMMUNITY
End: 2020-12-14 | Stop reason: SDUPTHER

## 2018-01-10 RX ORDER — CARVEDILOL 12.5 MG/1
12.5 TABLET ORAL 2 TIMES DAILY WITH MEALS
COMMUNITY
End: 2021-01-29 | Stop reason: SDUPTHER

## 2018-01-10 RX ORDER — CLONIDINE HYDROCHLORIDE 0.1 MG/1
0.2 TABLET ORAL NIGHTLY
COMMUNITY
End: 2020-10-22

## 2018-01-10 RX ORDER — SPIRONOLACTONE 25 MG/1
25 TABLET ORAL DAILY
COMMUNITY
End: 2020-05-04

## 2018-01-10 NOTE — DISCHARGE INSTRUCTIONS
DAY OF SURGERY INSTRUCTIONS        YOUR SURGEON: **DR. TREJO    PROCEDURE: ****LEFT LUMBAR 3-4, LUMBAR 4-5 LATERAL LUMBAR INTERBODY FUSION      DATE OF SURGERY: ***1/17/18    ARRIVAL TIME: AS DIRECTED BY OFFICE    DAY OF SURGERY TAKE ONLY THESE MEDICATIONS: ***CARVEDILOL, ISOSORBIDE MONONITRATE, CLONIDINE        BEFORE YOU COME TO THE HOSPITAL  (Pre-op instructions)  • Do not eat, drink, smoke or chew gum after midnight the night before surgery.  This also includes no mints.  • Morning of surgery take only the medicines you have been instructed with a sip of water unless otherwise instructed  by your physician.  • Do not shave, wear makeup or dark nail polish.  • Remove all jewelry including rings.  • Leave anything you consider valuable at home.  • Leave your suitcase in the car until after your surgery.  • Bring the following with you if applicable:  o Picture ID and insurance, Medicare or Medicaid cards  o Co-pay/deductible required by insurance (cash, check, credit card)  o Copy of advance directive, living will or power-of- documents if not brought to PAT  o CPAP or BIPAP mask and tubing  o Relaxation aids (MP3 player, book, magazine)  • On the day of surgery check in at registration located at the main entrance of the hospital.       Outpatient Surgery Guidelines, Adult  Outpatient procedures are those for which the person having the procedure is allowed to go home the same day as the procedure. Various procedures are done on an outpatient basis. You should follow some general guidelines if you will be having an outpatient procedure.  LET YOUR HEALTH CARE PROVIDER KNOW ABOUT:  · Any allergies you have.  · All medicines you are taking, including vitamins, herbs, eye drops, creams, and over-the-counter medicines.  · Previous problems you or members of your family have had with the use of anesthetics.  · Any blood disorders you have.  · Previous surgeries you have had.  · Medical conditions you  have.  RISKS AND COMPLICATIONS  Your health care provider will discuss possible risks and complications with you before surgery. Common risks and complications include:    · Problems due to the use of anesthetics.  · Blood loss and replacement (does not apply to minor surgical procedures).  · Temporary increase in pain due to surgery.  · Uncorrected pain or problems that the surgery was meant to correct.  · Infection.  · New damage.  BEFORE THE PROCEDURE  · Ask your health care provider about changing or stopping your regular medicines. You may need to stop taking certain medicines in the days or weeks before the procedure.  · Stop smoking at least 2 weeks before surgery. This lowers your risk for complications during and after surgery. Ask your health care provider for help with this if needed.  · Eat your usual meals and a light supper the day before surgery. Continue fluid intake. Do not drink alcohol.  · Do not eat or drink after midnight the night before your surgery.   · Arrange for someone to take you home and to stay with you for 24 hours after the procedure. Medicine given for your procedure may affect your ability to drive or to care for yourself.  · Call your health care provider's office if you develop an illness or problem that may prevent you from safely having your procedure.  AFTER THE PROCEDURE  After surgery, you will be taken to a recovery area, where your progress will be monitored. If there are no complications, you will be allowed to go home when you are awake, stable, and taking fluids well. You may have numbness around the surgical site. Healing will take some time. You will have tenderness at the surgical site and may have some swelling and bruising. You may also have some nausea.  HOME CARE INSTRUCTIONS  · Do not drive for 24 hours, or as directed by your health care provider. Do not drive while taking prescription pain medicines.  · Do not drink alcohol for 24 hours.  · Do not make  important decisions or sign legal documents for 24 hours.  · You may resume a normal diet and activities as directed.  · Do not lift anything heavier than 10 pounds (4.5 kg) or play contact sports until your health care provider says it is okay.  · Change your bandages (dressings) as directed.  · Only take over-the-counter or prescription medicines as directed by your health care provider.  · Follow up with your health care provider as directed.  SEEK MEDICAL CARE IF:  · You have increased bleeding (more than a small spot) from the surgical site.  · You have redness, swelling, or increasing pain in the wound.  · You see pus coming from the wound.  · You have a fever.  · You notice a bad smell coming from the wound or dressing.  · You feel lightheaded or faint.  · You develop a rash.  · You have trouble breathing.  · You develop allergies.  MAKE SURE YOU:  · Understand these instructions.  · Will watch your condition.  · Will get help right away if you are not doing well or get worse.     This information is not intended to replace advice given to you by your health care provider. Make sure you discuss any questions you have with your health care provider.     Document Released: 09/12/2002 Document Revised: 05/03/2016 Document Reviewed: 05/22/2014  [employer] Interactive Patient Education ©2016 [employer]       Fall Prevention in Hospitals, Adult  As a hospital patient, your condition and the treatments you receive can increase your risk for falls. Some additional risk factors for falls in a hospital include:  · Being in an unfamiliar environment.  · Being on bed rest.  · Your surgery.  · Taking certain medicines.  · Your tubing requirements, such as intravenous (IV) therapy or catheters.  It is important that you learn how to decrease fall risks while at the hospital. Below are important tips that can help prevent falls.  SAFETY TIPS FOR PREVENTING FALLS  Talk about your risk of falling.  · Ask your health care  provider why you are at risk for falling. Is it your medicine, illness, tubing placement, or something else?  · Make a plan with your health care provider to keep you safe from falls.  · Ask your health care provider or pharmacist about side effects of your medicines. Some medicines can make you dizzy or affect your coordination.  Ask for help.  · Ask for help before getting out of bed. You may need to press your call button.  · Ask for assistance in getting safely to the toilet.  · Ask for a walker or cane to be put at your bedside. Ask that most of the side rails on your bed be placed up before your health care provider leaves the room.  · Ask family or friends to sit with you.  · Ask for things that are out of your reach, such as your glasses, hearing aids, telephone, bedside table, or call button.  Follow these tips to avoid falling:  · Stay lying or seated, rather than standing, while waiting for help.  · Wear rubber-soled slippers or shoes whenever you walk in the hospital.  · Avoid quick, sudden movements.  ¨ Change positions slowly.  ¨ Sit on the side of your bed before standing.  ¨ Stand up slowly and wait before you start to walk.  · Let your health care provider know if there is a spill on the floor.  · Pay careful attention to the medical equipment, electrical cords, and tubes around you.  · When you need help, use your call button by your bed or in the bathroom. Wait for one of your health care providers to help you.  · If you feel dizzy or unsure of your footing, return to bed and wait for assistance.  · Avoid being distracted by the TV, telephone, or another person in your room.  · Do not lean or support yourself on rolling objects, such as IV poles or bedside tables.     This information is not intended to replace advice given to you by your health care provider. Make sure you discuss any questions you have with your health care provider.     Document Released: 12/15/2001 Document Revised: 01/08/2016  Document Reviewed: 08/25/2013  [employer] Interactive Patient Education ©2016 [employer]       Surgical Site Infections FAQs  What is a Surgical Site Infection (SSI)?  A surgical site infection is an infection that occurs after surgery in the part of the body where the surgery took place. Most patients who have surgery do not develop an infection. However, infections develop in about 1 to 3 out of every 100 patients who have surgery.  Some of the common symptoms of a surgical site infection are:  · Redness and pain around the area where you had surgery  · Drainage of cloudy fluid from your surgical wound  · Fever  Can SSIs be treated?  Yes. Most surgical site infections can be treated with antibiotics. The antibiotic given to you depends on the bacteria (germs) causing the infection. Sometimes patients with SSIs also need another surgery to treat the infection.  What are some of the things that hospitals are doing to prevent SSIs?  To prevent SSIs, doctors, nurses, and other healthcare providers:  · Clean their hands and arms up to their elbows with an antiseptic agent just before the surgery.  · Clean their hands with soap and water or an alcohol-based hand rub before and after caring for each patient.  · May remove some of your hair immediately before your surgery using electric clippers if the hair is in the same area where the procedure will occur. They should not shave you with a razor.  · Wear special hair covers, masks, gowns, and gloves during surgery to keep the surgery area clean.  · Give you antibiotics before your surgery starts. In most cases, you should get antibiotics within 60 minutes before the surgery starts and the antibiotics should be stopped within 24 hours after surgery.  · Clean the skin at the site of your surgery with a special soap that kills germs.  What can I do to help prevent SSIs?  Before your surgery:  · Tell your doctor about other medical problems you may have. Health problems  such as allergies, diabetes, and obesity could affect your surgery and your treatment.  · Quit smoking. Patients who smoke get more infections. Talk to your doctor about how you can quit before your surgery.  · Do not shave near where you will have surgery. Shaving with a razor can irritate your skin and make it easier to develop an infection.  At the time of your surgery:  · Speak up if someone tries to shave you with a razor before surgery. Ask why you need to be shaved and talk with your surgeon if you have any concerns.  · Ask if you will get antibiotics before surgery.  After your surgery:  · Make sure that your healthcare providers clean their hands before examining you, either with soap and water or an alcohol-based hand rub.  · If you do not see your providers clean their hands, please ask them to do so.  · Family and friends who visit you should not touch the surgical wound or dressings.  · Family and friends should clean their hands with soap and water or an alcohol-based hand rub before and after visiting you. If you do not see them clean their hands, ask them to clean their hands.  What do I need to do when I go home from the hospital?  · Before you go home, your doctor or nurse should explain everything you need to know about taking care of your wound. Make sure you understand how to care for your wound before you leave the hospital.  · Always clean your hands before and after caring for your wound.  · Before you go home, make sure you know who to contact if you have questions or problems after you get home.  · If you have any symptoms of an infection, such as redness and pain at the surgery site, drainage, or fever, call your doctor immediately.  If you have additional questions, please ask your doctor or nurse.  Developed and co-sponsored by The Society for Healthcare Epidemiology of [place] (SHEA); Infectious Diseases Society of [place] (IDSA); American Hospital Association; Association for  Professionals in Infection Control and Epidemiology (APIC); Centers for Disease Control and Prevention (CDC); and The Joint Commission.     This information is not intended to replace advice given to you by your health care provider. Make sure you discuss any questions you have with your health care provider.     Document Released: 12/23/2014 Document Revised: 01/08/2016 Document Reviewed: 03/02/2016  [employer] Interactive Patient Education ©2016 [employer]       [facility]  CHG 4% Patient Instruction Sheet    Preparing the Skin Before Surgery  Preparing or “prepping” skin before surgery can reduce the risk of infection at the surgical site. To make the process easier,[facility] has chosen 4% Chlorhexidine Gluconate (CHG) antiseptic solution.   The steps below outline the prepping process and should be carefully followed.                                                                                                                                                      Prep the skin at the following time(s):                                                      We recommend you shower the night before surgery, and again the morning of surgery with the 4% CHG antiseptic solution using half of the bottle and a cloth each time.  Dress in clean clothes/sleepwear after showering.  See instructions below for application.          Do not apply any lotions or moisturizers.       Do not shave the area to be prepped for at least 2 days prior to surgery.    Clipping the hair may be done immediately prior to your surgery at the hospital    if needed.    Directions:  Thoroughly rinse your body with water.  Apply 4% CHG to a cloth and wash skin gently, paying special attention to the operative site.  Rinse again thoroughly.  Once you have begun using this product do not apply anything else to your skin. If itching or redness persists, rinse affected areas and discontinue use.    When using this product:  • Keep out of eyes,  ears, and mouth.  • If solution should contact these areas, rinse out promptly and thoroughly with water.  • For external use only.  • Do not use in genital area, on your face or head.      PATIENT/FAMILY/RESPONSIBLE PARTY VERBALIZES UNDERSTANDING OF ABOVE EDUCATION.  COPY OF PAIN SCALE GIVEN AND REVIEWED WITH VERBALIZED UNDERSTANDING.

## 2018-01-10 NOTE — NURSING NOTE
ZONIA FROM HEMATOLOGY CALLED. PT HAS A LOW PLATELET COUNT. PT BLOOD CLUMPED AND LAB NEEDS HER TO COME IN TO REDRAW CBC. ZONIA NEEDS TECH TO DRAW THE CBC IN A PURPLE, BLUE AND GREEN TUBE WITHOUT THE GEL TO PREVENT CLUMPING AND RUBBERBAND THEM TOGETHER FOR LAB WITH A NOTE THAT SAYS PATIENT IS A CLUMPER. PT HAS BEEN CALLED AND WILL COME IN THE MORNING OF 1/11/2018. ZONIA WILL CALL DR TREJO'S OFFICE TO NOTIFY THEM PER OUR PHONE CALL.

## 2018-01-11 LAB
DEPRECATED RDW RBC AUTO: 54.9 FL (ref 40–54)
ERYTHROCYTE [DISTWIDTH] IN BLOOD BY AUTOMATED COUNT: 16.6 % (ref 12–15)
HCT VFR BLD AUTO: 38.7 % (ref 37–47)
HGB BLD-MCNC: 12.1 G/DL (ref 12–16)
MCH RBC QN AUTO: 28.9 PG (ref 28–32)
MCHC RBC AUTO-ENTMCNC: 31.3 G/DL (ref 33–36)
MCV RBC AUTO: 92.4 FL (ref 82–98)
PLATELET # BLD AUTO: 193 10*3/MM3 (ref 130–400)
PMV BLD AUTO: 12.3 FL (ref 6–12)
RBC # BLD AUTO: 4.19 10*6/MM3 (ref 4.2–5.4)
WBC NRBC COR # BLD: 4.75 10*3/MM3 (ref 4.8–10.8)

## 2018-01-11 PROCEDURE — 85027 COMPLETE CBC AUTOMATED: CPT

## 2018-01-12 LAB — BACTERIA SPEC AEROBE CULT: NORMAL

## 2018-01-16 ENCOUNTER — ANESTHESIA EVENT (OUTPATIENT)
Dept: PERIOP | Facility: HOSPITAL | Age: 63
End: 2018-01-16

## 2018-01-17 ENCOUNTER — ANESTHESIA (OUTPATIENT)
Dept: PERIOP | Facility: HOSPITAL | Age: 63
End: 2018-01-17

## 2018-01-17 ENCOUNTER — HOSPITAL ENCOUNTER (INPATIENT)
Facility: HOSPITAL | Age: 63
LOS: 5 days | Discharge: NURSING, REHAB OR LONG-TERM CARE | End: 2018-01-22

## 2018-01-17 ENCOUNTER — APPOINTMENT (OUTPATIENT)
Dept: GENERAL RADIOLOGY | Facility: HOSPITAL | Age: 63
End: 2018-01-17

## 2018-01-17 DIAGNOSIS — Z74.09 IMPAIRED MOBILITY AND ADLS: ICD-10-CM

## 2018-01-17 DIAGNOSIS — Z74.09 IMPAIRED MOBILITY: ICD-10-CM

## 2018-01-17 DIAGNOSIS — Z78.9 IMPAIRED MOBILITY AND ADLS: ICD-10-CM

## 2018-01-17 PROBLEM — M54.40 LUMBAGO OF MULTIPLE SITES IN SPINE WITH SCIATICA: Status: ACTIVE | Noted: 2018-01-17

## 2018-01-17 PROBLEM — M43.16 SPONDYLOLISTHESIS OF LUMBAR REGION: Status: ACTIVE | Noted: 2018-01-17

## 2018-01-17 LAB
ABO GROUP BLD: NORMAL
BLD GP AB SCN SERPL QL: NEGATIVE
RH BLD: POSITIVE

## 2018-01-17 PROCEDURE — 25010000002 FENTANYL CITRATE (PF) 100 MCG/2ML SOLUTION: Performed by: NURSE ANESTHETIST, CERTIFIED REGISTERED

## 2018-01-17 PROCEDURE — 63710000001 PREDNISONE PER 5 MG

## 2018-01-17 PROCEDURE — 4A11X4G MONITORING OF PERIPHERAL NERVOUS ELECTRICAL ACTIVITY, INTRAOPERATIVE, EXTERNAL APPROACH: ICD-10-PCS

## 2018-01-17 PROCEDURE — 25010000002 MORPHINE SULFATE (PF) 2 MG/ML SOLUTION

## 2018-01-17 PROCEDURE — 25010000002 HYDROMORPHONE PER 4 MG: Performed by: ANESTHESIOLOGY

## 2018-01-17 PROCEDURE — C1713 ANCHOR/SCREW BN/BN,TIS/BN: HCPCS

## 2018-01-17 PROCEDURE — 25010000002 MIDAZOLAM PER 1 MG: Performed by: ANESTHESIOLOGY

## 2018-01-17 PROCEDURE — 25010000003 CEFAZOLIN PER 500 MG

## 2018-01-17 PROCEDURE — 76000 FLUOROSCOPY <1 HR PHYS/QHP: CPT

## 2018-01-17 PROCEDURE — 86900 BLOOD TYPING SEROLOGIC ABO: CPT

## 2018-01-17 PROCEDURE — 0ST20ZZ RESECTION OF LUMBAR VERTEBRAL DISC, OPEN APPROACH: ICD-10-PCS

## 2018-01-17 PROCEDURE — 25010000002 ONDANSETRON PER 1 MG: Performed by: NURSE ANESTHETIST, CERTIFIED REGISTERED

## 2018-01-17 PROCEDURE — 25010000002 PROPOFOL 1000 MG/ML EMULSION: Performed by: NURSE ANESTHETIST, CERTIFIED REGISTERED

## 2018-01-17 PROCEDURE — 25010000002 DEXAMETHASONE PER 1 MG: Performed by: ANESTHESIOLOGY

## 2018-01-17 PROCEDURE — 86901 BLOOD TYPING SEROLOGIC RH(D): CPT

## 2018-01-17 PROCEDURE — 0SG10A0 FUSION OF 2 OR MORE LUMBAR VERTEBRAL JOINTS WITH INTERBODY FUSION DEVICE, ANTERIOR APPROACH, ANTERIOR COLUMN, OPEN APPROACH: ICD-10-PCS

## 2018-01-17 PROCEDURE — 86850 RBC ANTIBODY SCREEN: CPT

## 2018-01-17 PROCEDURE — 72100 X-RAY EXAM L-S SPINE 2/3 VWS: CPT

## 2018-01-17 PROCEDURE — 25010000002 SUCCINYLCHOLINE PER 20 MG: Performed by: NURSE ANESTHETIST, CERTIFIED REGISTERED

## 2018-01-17 PROCEDURE — 94799 UNLISTED PULMONARY SVC/PX: CPT

## 2018-01-17 PROCEDURE — 25010000002 PROPOFOL 10 MG/ML EMULSION: Performed by: NURSE ANESTHETIST, CERTIFIED REGISTERED

## 2018-01-17 DEVICE — BONE FILLER VOID NANOSS BIOACTIVE 3D 20CC: Type: IMPLANTABLE DEVICE | Site: SPINE LUMBAR | Status: FUNCTIONAL

## 2018-01-17 DEVICE — IMPLANTABLE DEVICE: Type: IMPLANTABLE DEVICE | Status: FUNCTIONAL

## 2018-01-17 DEVICE — BONE GRAFT KIT 7510400 INFUSE MEDIUM
Type: IMPLANTABLE DEVICE | Status: FUNCTIONAL
Brand: INFUSE® BONE GRAFT

## 2018-01-17 RX ORDER — MORPHINE SULFATE 2 MG/ML
INJECTION, SOLUTION INTRAMUSCULAR; INTRAVENOUS
Status: COMPLETED
Start: 2018-01-17 | End: 2018-01-17

## 2018-01-17 RX ORDER — SODIUM CHLORIDE 0.9 % (FLUSH) 0.9 %
1-10 SYRINGE (ML) INJECTION AS NEEDED
Status: DISCONTINUED | OUTPATIENT
Start: 2018-01-17 | End: 2018-01-19

## 2018-01-17 RX ORDER — VALACYCLOVIR HYDROCHLORIDE 500 MG/1
500 TABLET, FILM COATED ORAL DAILY
Status: DISCONTINUED | OUTPATIENT
Start: 2018-01-17 | End: 2018-01-19

## 2018-01-17 RX ORDER — CYCLOBENZAPRINE HCL 10 MG
10 TABLET ORAL 3 TIMES DAILY PRN
Status: DISCONTINUED | OUTPATIENT
Start: 2018-01-17 | End: 2018-01-22 | Stop reason: HOSPADM

## 2018-01-17 RX ORDER — NALOXONE HCL 0.4 MG/ML
0.4 VIAL (ML) INJECTION
Status: DISCONTINUED | OUTPATIENT
Start: 2018-01-17 | End: 2018-01-19

## 2018-01-17 RX ORDER — LABETALOL HYDROCHLORIDE 5 MG/ML
5 INJECTION, SOLUTION INTRAVENOUS
Status: DISCONTINUED | OUTPATIENT
Start: 2018-01-17 | End: 2018-01-17 | Stop reason: HOSPADM

## 2018-01-17 RX ORDER — CARVEDILOL 6.25 MG/1
12.5 TABLET ORAL 2 TIMES DAILY WITH MEALS
Status: DISCONTINUED | OUTPATIENT
Start: 2018-01-17 | End: 2018-01-22 | Stop reason: HOSPADM

## 2018-01-17 RX ORDER — BISACODYL 5 MG/1
5 TABLET, DELAYED RELEASE ORAL DAILY PRN
Status: DISCONTINUED | OUTPATIENT
Start: 2018-01-17 | End: 2018-01-22 | Stop reason: HOSPADM

## 2018-01-17 RX ORDER — LIDOCAINE HYDROCHLORIDE 20 MG/ML
INJECTION, SOLUTION INFILTRATION; PERINEURAL AS NEEDED
Status: DISCONTINUED | OUTPATIENT
Start: 2018-01-17 | End: 2018-01-17 | Stop reason: SURG

## 2018-01-17 RX ORDER — FLUTICASONE PROPIONATE 50 MCG
2 SPRAY, SUSPENSION (ML) NASAL DAILY
Status: DISCONTINUED | OUTPATIENT
Start: 2018-01-17 | End: 2018-01-22 | Stop reason: HOSPADM

## 2018-01-17 RX ORDER — ACETAMINOPHEN 325 MG/1
650 TABLET ORAL EVERY 4 HOURS PRN
Status: DISCONTINUED | OUTPATIENT
Start: 2018-01-17 | End: 2018-01-22 | Stop reason: HOSPADM

## 2018-01-17 RX ORDER — PREDNISONE 1 MG/1
2 TABLET ORAL DAILY
Status: DISCONTINUED | OUTPATIENT
Start: 2018-01-17 | End: 2018-01-22 | Stop reason: HOSPADM

## 2018-01-17 RX ORDER — UREA 10 %
3 LOTION (ML) TOPICAL NIGHTLY PRN
Status: DISCONTINUED | OUTPATIENT
Start: 2018-01-17 | End: 2018-01-18 | Stop reason: ALTCHOICE

## 2018-01-17 RX ORDER — ISOSORBIDE MONONITRATE 30 MG/1
30 TABLET, EXTENDED RELEASE ORAL DAILY
Status: DISCONTINUED | OUTPATIENT
Start: 2018-01-18 | End: 2018-01-22 | Stop reason: HOSPADM

## 2018-01-17 RX ORDER — DOCUSATE SODIUM 100 MG/1
100 CAPSULE, LIQUID FILLED ORAL 2 TIMES DAILY PRN
Status: DISCONTINUED | OUTPATIENT
Start: 2018-01-17 | End: 2018-01-22 | Stop reason: HOSPADM

## 2018-01-17 RX ORDER — HYDRALAZINE HYDROCHLORIDE 20 MG/ML
5 INJECTION INTRAMUSCULAR; INTRAVENOUS
Status: DISCONTINUED | OUTPATIENT
Start: 2018-01-17 | End: 2018-01-17 | Stop reason: HOSPADM

## 2018-01-17 RX ORDER — CLONIDINE HYDROCHLORIDE 0.2 MG/1
0.2 TABLET ORAL NIGHTLY
Status: DISCONTINUED | OUTPATIENT
Start: 2018-01-17 | End: 2018-01-22 | Stop reason: HOSPADM

## 2018-01-17 RX ORDER — ALPRAZOLAM 0.5 MG/1
0.5 TABLET ORAL 3 TIMES DAILY PRN
Status: DISCONTINUED | OUTPATIENT
Start: 2018-01-17 | End: 2018-01-22 | Stop reason: HOSPADM

## 2018-01-17 RX ORDER — SODIUM CHLORIDE 0.9 % (FLUSH) 0.9 %
3 SYRINGE (ML) INJECTION AS NEEDED
Status: DISCONTINUED | OUTPATIENT
Start: 2018-01-17 | End: 2018-01-17 | Stop reason: HOSPADM

## 2018-01-17 RX ORDER — SODIUM CHLORIDE, SODIUM LACTATE, POTASSIUM CHLORIDE, CALCIUM CHLORIDE 600; 310; 30; 20 MG/100ML; MG/100ML; MG/100ML; MG/100ML
1000 INJECTION, SOLUTION INTRAVENOUS CONTINUOUS
Status: DISCONTINUED | OUTPATIENT
Start: 2018-01-17 | End: 2018-01-17 | Stop reason: HOSPADM

## 2018-01-17 RX ORDER — CETIRIZINE HYDROCHLORIDE 10 MG/1
10 TABLET ORAL DAILY
Status: DISCONTINUED | OUTPATIENT
Start: 2018-01-17 | End: 2018-01-22 | Stop reason: HOSPADM

## 2018-01-17 RX ORDER — PREGABALIN 50 MG/1
50 CAPSULE ORAL DAILY
Status: DISCONTINUED | OUTPATIENT
Start: 2018-01-17 | End: 2018-01-22 | Stop reason: HOSPADM

## 2018-01-17 RX ORDER — HYDROCODONE BITARTRATE AND ACETAMINOPHEN 7.5; 325 MG/1; MG/1
1 TABLET ORAL EVERY 4 HOURS PRN
Status: DISCONTINUED | OUTPATIENT
Start: 2018-01-17 | End: 2018-01-22 | Stop reason: HOSPADM

## 2018-01-17 RX ORDER — HYDROMORPHONE HCL 110MG/55ML
0.5 PATIENT CONTROLLED ANALGESIA SYRINGE INTRAVENOUS
Status: DISCONTINUED | OUTPATIENT
Start: 2018-01-17 | End: 2018-01-17 | Stop reason: SDUPTHER

## 2018-01-17 RX ORDER — PROMETHAZINE HYDROCHLORIDE 25 MG/ML
12.5 INJECTION, SOLUTION INTRAMUSCULAR; INTRAVENOUS EVERY 6 HOURS PRN
Status: DISCONTINUED | OUTPATIENT
Start: 2018-01-17 | End: 2018-01-22 | Stop reason: HOSPADM

## 2018-01-17 RX ORDER — IPRATROPIUM BROMIDE AND ALBUTEROL SULFATE 2.5; .5 MG/3ML; MG/3ML
3 SOLUTION RESPIRATORY (INHALATION) ONCE AS NEEDED
Status: DISCONTINUED | OUTPATIENT
Start: 2018-01-17 | End: 2018-01-17 | Stop reason: HOSPADM

## 2018-01-17 RX ORDER — HYDROCODONE BITARTRATE AND ACETAMINOPHEN 10; 325 MG/1; MG/1
1 TABLET ORAL EVERY 6 HOURS PRN
COMMUNITY
End: 2018-01-22 | Stop reason: HOSPADM

## 2018-01-17 RX ORDER — MIDAZOLAM HYDROCHLORIDE 1 MG/ML
1 INJECTION INTRAMUSCULAR; INTRAVENOUS
Status: DISCONTINUED | OUTPATIENT
Start: 2018-01-17 | End: 2018-01-17 | Stop reason: HOSPADM

## 2018-01-17 RX ORDER — NALOXONE HCL 0.4 MG/ML
0.4 VIAL (ML) INJECTION
Status: DISCONTINUED | OUTPATIENT
Start: 2018-01-17 | End: 2018-01-17 | Stop reason: SDUPTHER

## 2018-01-17 RX ORDER — ACETAMINOPHEN 160 MG/5ML
650 SOLUTION ORAL EVERY 4 HOURS PRN
Status: DISCONTINUED | OUTPATIENT
Start: 2018-01-17 | End: 2018-01-17 | Stop reason: SDUPTHER

## 2018-01-17 RX ORDER — MIDAZOLAM HYDROCHLORIDE 1 MG/ML
2 INJECTION INTRAMUSCULAR; INTRAVENOUS
Status: DISCONTINUED | OUTPATIENT
Start: 2018-01-17 | End: 2018-01-17 | Stop reason: HOSPADM

## 2018-01-17 RX ORDER — ONDANSETRON 4 MG/1
4 TABLET, FILM COATED ORAL EVERY 6 HOURS PRN
Status: DISCONTINUED | OUTPATIENT
Start: 2018-01-17 | End: 2018-01-22 | Stop reason: HOSPADM

## 2018-01-17 RX ORDER — SUFENTANIL CITRATE 50 UG/ML
INJECTION EPIDURAL; INTRAVENOUS AS NEEDED
Status: DISCONTINUED | OUTPATIENT
Start: 2018-01-17 | End: 2018-01-17 | Stop reason: SURG

## 2018-01-17 RX ORDER — NITROGLYCERIN 0.4 MG/1
0.4 TABLET SUBLINGUAL
Status: DISCONTINUED | OUTPATIENT
Start: 2018-01-17 | End: 2018-01-22 | Stop reason: HOSPADM

## 2018-01-17 RX ORDER — OSELTAMIVIR PHOSPHATE 75 MG/1
75 CAPSULE ORAL DAILY
Status: DISCONTINUED | OUTPATIENT
Start: 2018-01-17 | End: 2018-01-19

## 2018-01-17 RX ORDER — NALOXONE HCL 0.4 MG/ML
0.04 VIAL (ML) INJECTION AS NEEDED
Status: DISCONTINUED | OUTPATIENT
Start: 2018-01-17 | End: 2018-01-17 | Stop reason: HOSPADM

## 2018-01-17 RX ORDER — FOLIC ACID 1 MG/1
1 TABLET ORAL DAILY
Status: DISCONTINUED | OUTPATIENT
Start: 2018-01-17 | End: 2018-01-22 | Stop reason: HOSPADM

## 2018-01-17 RX ORDER — LEVOTHYROXINE SODIUM 0.07 MG/1
75 TABLET ORAL
Status: DISCONTINUED | OUTPATIENT
Start: 2018-01-18 | End: 2018-01-22 | Stop reason: HOSPADM

## 2018-01-17 RX ORDER — PANTOPRAZOLE SODIUM 40 MG/1
40 TABLET, DELAYED RELEASE ORAL DAILY
Status: DISCONTINUED | OUTPATIENT
Start: 2018-01-17 | End: 2018-01-22 | Stop reason: HOSPADM

## 2018-01-17 RX ORDER — METOCLOPRAMIDE HYDROCHLORIDE 5 MG/ML
5 INJECTION INTRAMUSCULAR; INTRAVENOUS
Status: DISCONTINUED | OUTPATIENT
Start: 2018-01-17 | End: 2018-01-17 | Stop reason: HOSPADM

## 2018-01-17 RX ORDER — MORPHINE SULFATE 2 MG/ML
2 INJECTION, SOLUTION INTRAMUSCULAR; INTRAVENOUS AS NEEDED
Status: DISCONTINUED | OUTPATIENT
Start: 2018-01-17 | End: 2018-01-17 | Stop reason: HOSPADM

## 2018-01-17 RX ORDER — DEXAMETHASONE SODIUM PHOSPHATE 4 MG/ML
4 INJECTION, SOLUTION INTRA-ARTICULAR; INTRALESIONAL; INTRAMUSCULAR; INTRAVENOUS; SOFT TISSUE ONCE AS NEEDED
Status: COMPLETED | OUTPATIENT
Start: 2018-01-17 | End: 2018-01-17

## 2018-01-17 RX ORDER — FLUMAZENIL 0.1 MG/ML
0.2 INJECTION INTRAVENOUS AS NEEDED
Status: DISCONTINUED | OUTPATIENT
Start: 2018-01-17 | End: 2018-01-17 | Stop reason: HOSPADM

## 2018-01-17 RX ORDER — SODIUM CHLORIDE, SODIUM LACTATE, POTASSIUM CHLORIDE, CALCIUM CHLORIDE 600; 310; 30; 20 MG/100ML; MG/100ML; MG/100ML; MG/100ML
100 INJECTION, SOLUTION INTRAVENOUS CONTINUOUS
Status: DISCONTINUED | OUTPATIENT
Start: 2018-01-17 | End: 2018-01-17 | Stop reason: HOSPADM

## 2018-01-17 RX ORDER — OXYCODONE AND ACETAMINOPHEN 7.5; 325 MG/1; MG/1
2 TABLET ORAL EVERY 4 HOURS PRN
Status: DISCONTINUED | OUTPATIENT
Start: 2018-01-17 | End: 2018-01-22 | Stop reason: HOSPADM

## 2018-01-17 RX ORDER — ROCURONIUM BROMIDE 10 MG/ML
INJECTION, SOLUTION INTRAVENOUS AS NEEDED
Status: DISCONTINUED | OUTPATIENT
Start: 2018-01-17 | End: 2018-01-17 | Stop reason: SURG

## 2018-01-17 RX ORDER — SODIUM CHLORIDE 0.9 % (FLUSH) 0.9 %
1-10 SYRINGE (ML) INJECTION AS NEEDED
Status: DISCONTINUED | OUTPATIENT
Start: 2018-01-17 | End: 2018-01-22 | Stop reason: HOSPADM

## 2018-01-17 RX ORDER — FENTANYL CITRATE 50 UG/ML
INJECTION, SOLUTION INTRAMUSCULAR; INTRAVENOUS AS NEEDED
Status: DISCONTINUED | OUTPATIENT
Start: 2018-01-17 | End: 2018-01-17 | Stop reason: SURG

## 2018-01-17 RX ORDER — FUROSEMIDE 40 MG/1
40 TABLET ORAL DAILY
Status: DISCONTINUED | OUTPATIENT
Start: 2018-01-17 | End: 2018-01-22 | Stop reason: HOSPADM

## 2018-01-17 RX ORDER — SPIRONOLACTONE 25 MG/1
25 TABLET ORAL DAILY
Status: DISCONTINUED | OUTPATIENT
Start: 2018-01-17 | End: 2018-01-22 | Stop reason: HOSPADM

## 2018-01-17 RX ORDER — ONDANSETRON 2 MG/ML
4 INJECTION INTRAMUSCULAR; INTRAVENOUS AS NEEDED
Status: DISCONTINUED | OUTPATIENT
Start: 2018-01-17 | End: 2018-01-17 | Stop reason: HOSPADM

## 2018-01-17 RX ORDER — ONDANSETRON 2 MG/ML
4 INJECTION INTRAMUSCULAR; INTRAVENOUS EVERY 6 HOURS PRN
Status: DISCONTINUED | OUTPATIENT
Start: 2018-01-17 | End: 2018-01-17 | Stop reason: SDUPTHER

## 2018-01-17 RX ORDER — SODIUM CHLORIDE, SODIUM LACTATE, POTASSIUM CHLORIDE, CALCIUM CHLORIDE 600; 310; 30; 20 MG/100ML; MG/100ML; MG/100ML; MG/100ML
30 INJECTION, SOLUTION INTRAVENOUS CONTINUOUS
Status: DISCONTINUED | OUTPATIENT
Start: 2018-01-17 | End: 2018-01-17 | Stop reason: HOSPADM

## 2018-01-17 RX ORDER — SODIUM CHLORIDE 0.9 % (FLUSH) 0.9 %
1-10 SYRINGE (ML) INJECTION AS NEEDED
Status: DISCONTINUED | OUTPATIENT
Start: 2018-01-17 | End: 2018-01-17 | Stop reason: HOSPADM

## 2018-01-17 RX ORDER — MEPERIDINE HYDROCHLORIDE 25 MG/ML
12.5 INJECTION INTRAMUSCULAR; INTRAVENOUS; SUBCUTANEOUS
Status: DISCONTINUED | OUTPATIENT
Start: 2018-01-17 | End: 2018-01-17 | Stop reason: HOSPADM

## 2018-01-17 RX ORDER — PROPOFOL 10 MG/ML
VIAL (ML) INTRAVENOUS AS NEEDED
Status: DISCONTINUED | OUTPATIENT
Start: 2018-01-17 | End: 2018-01-17 | Stop reason: SURG

## 2018-01-17 RX ORDER — MORPHINE SULFATE 2 MG/ML
1 INJECTION, SOLUTION INTRAMUSCULAR; INTRAVENOUS EVERY 4 HOURS PRN
Status: DISCONTINUED | OUTPATIENT
Start: 2018-01-17 | End: 2018-01-19

## 2018-01-17 RX ORDER — ONDANSETRON 2 MG/ML
INJECTION INTRAMUSCULAR; INTRAVENOUS AS NEEDED
Status: DISCONTINUED | OUTPATIENT
Start: 2018-01-17 | End: 2018-01-17 | Stop reason: SURG

## 2018-01-17 RX ORDER — SODIUM CHLORIDE 9 MG/ML
50 INJECTION, SOLUTION INTRAVENOUS CONTINUOUS
Status: DISCONTINUED | OUTPATIENT
Start: 2018-01-17 | End: 2018-01-22 | Stop reason: HOSPADM

## 2018-01-17 RX ORDER — ONDANSETRON 4 MG/1
4 TABLET, ORALLY DISINTEGRATING ORAL EVERY 6 HOURS PRN
Status: DISCONTINUED | OUTPATIENT
Start: 2018-01-17 | End: 2018-01-22 | Stop reason: HOSPADM

## 2018-01-17 RX ORDER — MAGNESIUM HYDROXIDE 1200 MG/15ML
LIQUID ORAL AS NEEDED
Status: DISCONTINUED | OUTPATIENT
Start: 2018-01-17 | End: 2018-01-17 | Stop reason: HOSPADM

## 2018-01-17 RX ORDER — OXYCODONE AND ACETAMINOPHEN 7.5; 325 MG/1; MG/1
1 TABLET ORAL EVERY 4 HOURS PRN
Status: DISCONTINUED | OUTPATIENT
Start: 2018-01-17 | End: 2018-01-22 | Stop reason: HOSPADM

## 2018-01-17 RX ORDER — NALOXONE HCL 0.4 MG/ML
0.4 VIAL (ML) INJECTION
Status: DISCONTINUED | OUTPATIENT
Start: 2018-01-17 | End: 2018-01-22 | Stop reason: HOSPADM

## 2018-01-17 RX ORDER — SUCCINYLCHOLINE CHLORIDE 20 MG/ML
INJECTION INTRAMUSCULAR; INTRAVENOUS AS NEEDED
Status: DISCONTINUED | OUTPATIENT
Start: 2018-01-17 | End: 2018-01-17 | Stop reason: SURG

## 2018-01-17 RX ORDER — ONDANSETRON 2 MG/ML
4 INJECTION INTRAMUSCULAR; INTRAVENOUS EVERY 6 HOURS PRN
Status: DISCONTINUED | OUTPATIENT
Start: 2018-01-17 | End: 2018-01-22 | Stop reason: HOSPADM

## 2018-01-17 RX ADMIN — LIDOCAINE HYDROCHLORIDE 100 MG: 20 INJECTION, SOLUTION INFILTRATION; PERINEURAL at 14:16

## 2018-01-17 RX ADMIN — CARVEDILOL 12.5 MG: 6.25 TABLET, FILM COATED ORAL at 20:36

## 2018-01-17 RX ADMIN — OSELTAMIVIR PHOSPHATE 75 MG: 75 CAPSULE ORAL at 20:37

## 2018-01-17 RX ADMIN — SPIRONOLACTONE 25 MG: 25 TABLET ORAL at 20:36

## 2018-01-17 RX ADMIN — MORPHINE SULFATE 1 MG: 2 INJECTION, SOLUTION INTRAMUSCULAR; INTRAVENOUS at 17:54

## 2018-01-17 RX ADMIN — FOLIC ACID 1 MG: 1 TABLET ORAL at 20:36

## 2018-01-17 RX ADMIN — DEXAMETHASONE SODIUM PHOSPHATE 4 MG: 4 INJECTION, SOLUTION INTRAMUSCULAR; INTRAVENOUS at 13:54

## 2018-01-17 RX ADMIN — CLONIDINE HYDROCHLORIDE 0.2 MG: 0.2 TABLET ORAL at 20:37

## 2018-01-17 RX ADMIN — SODIUM CHLORIDE, POTASSIUM CHLORIDE, SODIUM LACTATE AND CALCIUM CHLORIDE 30 ML/HR: 600; 310; 30; 20 INJECTION, SOLUTION INTRAVENOUS at 13:32

## 2018-01-17 RX ADMIN — PROPOFOL 150 MG: 10 INJECTION, EMULSION INTRAVENOUS at 14:17

## 2018-01-17 RX ADMIN — SUFENTANIL CITRATE 35 MCG: 50 INJECTION, SOLUTION EPIDURAL; INTRAVENOUS at 14:34

## 2018-01-17 RX ADMIN — OXYCODONE HYDROCHLORIDE AND ACETAMINOPHEN 2 TABLET: 7.5; 325 TABLET ORAL at 20:08

## 2018-01-17 RX ADMIN — SUCCINYLCHOLINE CHLORIDE 120 MG: 20 INJECTION, SOLUTION INTRAMUSCULAR; INTRAVENOUS at 14:17

## 2018-01-17 RX ADMIN — ROCURONIUM BROMIDE 5 MG: 10 INJECTION INTRAVENOUS at 14:17

## 2018-01-17 RX ADMIN — HYDROMORPHONE HYDROCHLORIDE 1 MG: 1 INJECTION, SOLUTION INTRAMUSCULAR; INTRAVENOUS; SUBCUTANEOUS at 16:48

## 2018-01-17 RX ADMIN — FENTANYL CITRATE 100 MCG: 50 INJECTION, SOLUTION INTRAMUSCULAR; INTRAVENOUS at 15:14

## 2018-01-17 RX ADMIN — PROPOFOL 125 MCG/KG/MIN: 10 INJECTION, EMULSION INTRAVENOUS at 14:16

## 2018-01-17 RX ADMIN — FLUTICASONE PROPIONATE 2 SPRAY: 50 SPRAY, METERED NASAL at 20:40

## 2018-01-17 RX ADMIN — PREDNISONE 2 MG: 1 TABLET ORAL at 20:37

## 2018-01-17 RX ADMIN — SUFENTANIL CITRATE 15 MCG: 50 INJECTION, SOLUTION EPIDURAL; INTRAVENOUS at 14:17

## 2018-01-17 RX ADMIN — CYCLOBENZAPRINE HYDROCHLORIDE 10 MG: 10 TABLET, FILM COATED ORAL at 20:08

## 2018-01-17 RX ADMIN — Medication 1 G: at 14:22

## 2018-01-17 RX ADMIN — CEFAZOLIN 1 G: 1 INJECTION, POWDER, FOR SOLUTION INTRAMUSCULAR; INTRAVENOUS at 22:40

## 2018-01-17 RX ADMIN — VALACYCLOVIR 500 MG: 500 TABLET, FILM COATED ORAL at 20:36

## 2018-01-17 RX ADMIN — ONDANSETRON HYDROCHLORIDE 4 MG: 2 SOLUTION INTRAMUSCULAR; INTRAVENOUS at 15:54

## 2018-01-17 RX ADMIN — PANTOPRAZOLE SODIUM 40 MG: 40 TABLET, DELAYED RELEASE ORAL at 20:36

## 2018-01-17 RX ADMIN — PREGABALIN 50 MG: 50 CAPSULE ORAL at 20:37

## 2018-01-17 RX ADMIN — LIDOCAINE HYDROCHLORIDE 0.5 ML: 10 INJECTION, SOLUTION EPIDURAL; INFILTRATION; INTRACAUDAL; PERINEURAL at 13:32

## 2018-01-17 RX ADMIN — HYDROMORPHONE HYDROCHLORIDE 1 MG: 1 INJECTION, SOLUTION INTRAMUSCULAR; INTRAVENOUS; SUBCUTANEOUS at 16:38

## 2018-01-17 RX ADMIN — MIDAZOLAM HYDROCHLORIDE 2 MG: 1 INJECTION, SOLUTION INTRAMUSCULAR; INTRAVENOUS at 13:54

## 2018-01-17 RX ADMIN — FENTANYL CITRATE 100 MCG: 50 INJECTION, SOLUTION INTRAMUSCULAR; INTRAVENOUS at 15:08

## 2018-01-17 RX ADMIN — SODIUM CHLORIDE, POTASSIUM CHLORIDE, SODIUM LACTATE AND CALCIUM CHLORIDE 1000 ML: 600; 310; 30; 20 INJECTION, SOLUTION INTRAVENOUS at 13:31

## 2018-01-17 RX ADMIN — CETIRIZINE HYDROCHLORIDE 10 MG: 10 TABLET, FILM COATED ORAL at 20:36

## 2018-01-17 NOTE — BRIEF OP NOTE
LUMBAR LAMINECTOMY EXTREME LATERAL INTERBODY FUSION  Progress Note    Tawny Shin  1/17/2018    Pre-op Diagnosis:   LUMBAR STENOSIS       Post-Op Diagnosis Codes:       Procedure/CPT® Codes:      Procedure(s):  LEFT L3-4 L4-5 LATERAL LUMBAR INTERBODY FUSION    Surgeon(s):  Fortino Oropeza MD    Anesthesia: General    Staff:   Circulator: Nino Davies RN; Trinity Allen RN  Scrub Person: Maritza Arteaga; Hernán Paulino; Jennifer Muir; Megan Rivero  Vendor Representative: Karson Siddiqui    Estimated Blood Loss: 50 mL    Urine Voided: * No values recorded between 1/17/2018  2:02 PM and 1/17/2018  3:58 PM *    Specimens:                None      Drains:           Findings: SEE OPERATIVE REPORT    Complications: NONE      VIRGIL Rodriguez     Date: 1/17/2018  Time: 4:06 PM

## 2018-01-17 NOTE — BRIEF OP NOTE
LUMBAR LAMINECTOMY EXTREME LATERAL INTERBODY FUSION  Progress Note    Tawny SHELLI Kip  1/17/2018    Pre-op Diagnosis:   LUMBAR STENOSIS L34 & L45  Spondylolisthesis L34 & L45  RLE lumbar radiculopathy  Sagittal plane imbalance  DDD Lumbar spine       Post-Op Diagnosis :  \  Same       Procedure/CPT® Codes:  97692  30309  61881  22851 x 2      Procedure(s):  LEFT L3-4 L4-5 LATERAL LUMBAR INTERBODY FUSION    Surgeon(s):  Fortino Oropeza MD    Anesthesia: General    Staff:   Circulator: Nino Davies RN; Trinity Allen RN  Scrub Person: Maritza Arteaga; Hernán Paulino; Jennifer Muir; Megan Rivero  Vendor Representative: Karson Siddiqui    Estimated Blood Loss: 50 mL    Urine Voided: N/A    Specimens:                None      Drains:           Findings: see op note    Complications: none      Fortino Oropeza MD     Date: 1/17/2018  Time: 3:59 PM

## 2018-01-17 NOTE — PLAN OF CARE
Problem: Patient Care Overview (Adult)  Goal: Plan of Care Review  Outcome: Ongoing (interventions implemented as appropriate)   01/17/18 1350   Coping/Psychosocial Response Interventions   Plan Of Care Reviewed With patient   Patient Care Overview   Progress no change       Problem: Perioperative Period (Adult)  Goal: Signs and Symptoms of Listed Potential Problems Will be Absent or Manageable (Perioperative Period)  Outcome: Ongoing (interventions implemented as appropriate)   01/17/18 1350   Perioperative Period   Problems Assessed (Perioperative Period) pain;embolism;infection   Problems Present (Perioperative Period) pain

## 2018-01-17 NOTE — PLAN OF CARE
Problem: Patient Care Overview (Adult)  Goal: Plan of Care Review  Outcome: Ongoing (interventions implemented as appropriate)   01/17/18 0693   Coping/Psychosocial Response Interventions   Plan Of Care Reviewed With patient;family   Patient Care Overview   Progress no change   Outcome Evaluation   Outcome Summary/Follow up Plan FROM PACU VIA BED, C/O SHIVERING AND PAIN. LEFT FLANK DRSG DRY AND INTACT. NO NEURO DEFICITS.     Goal: Adult Individualization and Mutuality  Outcome: Ongoing (interventions implemented as appropriate)    Goal: Discharge Needs Assessment  Outcome: Ongoing (interventions implemented as appropriate)      Problem: Perioperative Period (Adult)  Goal: Signs and Symptoms of Listed Potential Problems Will be Absent or Manageable (Perioperative Period)  Outcome: Ongoing (interventions implemented as appropriate)

## 2018-01-17 NOTE — ANESTHESIA PROCEDURE NOTES
Airway  Urgency: elective    Airway not difficult    General Information and Staff    Patient location during procedure: OR  CRNA: FLIP FLOR    Indications and Patient Condition  Indications for airway management: airway protection    Preoxygenated: yes  Mask difficulty assessment: 1 - vent by mask    Final Airway Details  Final airway type: endotracheal airway      Successful airway: ETT  Cuffed: yes   Successful intubation technique: direct laryngoscopy  Facilitating devices/methods: Bougie  Endotracheal tube insertion site: oral  Blade: Flores  Blade size: #2  ETT size: 7.5 mm  Cormack-Lehane Classification: grade IIa - partial view of glottis  Placement verified by: chest auscultation and capnometry   Cuff volume (mL): 8  Measured from: lips  ETT to lips (cm): 21  Number of attempts at approach: 1    Additional Comments  Atraumatic intubation

## 2018-01-17 NOTE — ANESTHESIA PREPROCEDURE EVALUATION
Anesthesia Evaluation     Patient summary reviewed   no history of anesthetic complications:  NPO Solid Status: > 8 hours  NPO Liquid Status: > 8 hours     Airway   Mallampati: II  TM distance: >3 FB  Neck ROM: full  no difficulty expected  Dental          Pulmonary - normal exam   (+) asthma,   (-) COPD, sleep apnea, not a smoker  Cardiovascular - normal exam  Exercise tolerance: good (4-7 METS)    ECG reviewed  PT is on anticoagulation therapy  Patient on routine beta blocker and Beta blocker given within 24 hours of surgery    (+) pacemaker pacemaker, hypertension, past MI (2013) , cardiac stents (2012, 2013)   (-) angina    ROS comment: Off brilinta x1 week    Neuro/Psych  (-) seizures, TIA, CVA  GI/Hepatic/Renal/Endo    (-) liver disease, no renal disease, diabetes    Musculoskeletal     Abdominal    Substance History      OB/GYN          Other        ROS/Med Hx Other: Sjogrens disease                                          Anesthesia Plan    ASA 3     general     intravenous induction   Anesthetic plan and risks discussed with patient.

## 2018-01-17 NOTE — OP NOTE
PREOPERATIVE DIAGNOSES  1. Lumbar stenosis, L34 & L45.  2. Lumbar degenerative disk disease  3. Lumbar Radiculopathy  4. Lower back pain  5. L45 disc herniation with extruded fragment   6. Spondylolisthesis L34 & L45  7. 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 Left-sided lateral lumbar interbody fusion through a retroperitoneal  approach.  2. Placement of biomechanical titanium interbody fusion space, L45 & L34, using a [id number] mm spacer at both levels.(K2M [place])  3. Use of allograft bone ([place] soaked in BMP)   4. Use of intraoperative neuromonitoring.  5. Use of intraoperative fluoroscopy for verification of positioning of the  interbody, as well as confirming the appropriate levels being addressed.       ASSISTANT  LANETTE Moraes assisted throughout all key components of this case by retracting soft  tissues and helping to enable adequate visualization to safely complete all  aspects of this procedure. He was scrubbed throughout the entirety of the  procedure.     ANESTHESIA  General endotracheal anesthesia.     ESTIMATED BLOOD LOSS:   50 mL.     INTRAVENOUS FLUIDS  Crystalloid.    COMPLICATIONS  None.     IMPLANTS  See Brief op note.     PROCEDURE IN DETAIL  The patient was seen on the day of surgery in the preoperative holding area.  The operative site was identified and prophylactic antibiotics were administered.  The patient was taken to the operating room, where the patient was placed under general endotracheal anesthesia and positioned in the right lateral decubitus position with the left side up. An axillary roll was placed. All bony prominences were padded. The patient was held in the lateral position with 3-inch wide silk tape using our standard taping technique. The C-arm was used to confirm that the positioning was satisfactory on both AP and lateral images. Once this was accomplished, the left flank was  prepped and draped in sterile fashion. A time-out was called and all in the room agreed to proceed.    Next, a left-sided transverse incision was made just above the iliac crest & taken down through the skin and subcutaneous fat down to the external oblique fascia. The external oblique fascia was opened and the muscular fibers were divided using fingertip dissection until the internal oblique fibers were identified. The internal obliques were divided and the transversus abdominis musculature was divided. The retroperitoneal space was entered. The characteristic retroperitoneal fat was visualized. The peritoneal contents were mobilized anteriorly with the assistance of a lighted right angled hand held retractor.     The psoas was directly visualized. The fascia over the psoas was opened and then the muscular fibers of the psoas were dilated with the initial dilator for the retractor. Once this was done, nerve stimulation was used to ensure the positioning was satisfactory with regard to the positional relationship to the nerve.  C-arm flouroscopy images were also used to confirm the positioning of the dilator.     Once it was determined that a safe docking point for the retractor had been Localized over the L45 disc space, a guide wire was placed into the disc space.  A larger dilator was used to further dilate the musculature and the retractor was docked onto the operative disk space without any complication.  The intradiskal alfredo was introduced into the disk space and the retractor was opened cranio-caudally and anteriorly, exposing the annulus of the disk space nicely. The anterior retractor blade was placed in front of the anterior longitudinal ligament and behind the great vessels and stabilized by attaching it to the top of the retractor.     A 15-blade scalpel was then used to create a large annulotomy and through this annulotomy Martin elevators were used to separate the cartilaginous portion of the end plates  from the bony end plates and then to release the contralateral annulus.  The disc material was removed entirely and the disc space was prepared for fusion with the pituitaries, rasps and various curettes from the disk prep set. Once this was completed and the cartilage had been removed from the end plates, the disk space was irrigated and suctioned.     The trial spacer was placed into the disk space. It was found that a [id number] mm  spacer was the appropriate size spacer. A Titanium, K2M spacer of this size was then obtained and packed with a mixture of autograft and allograft bone. The spacer was tamped to the appropriate depth and then the insertion handle was removed. The C-arm fluoroscopy images showed satisfactory positioning of the implant and excellent restoration of the disk space height, anterior column height,  as well as foraminal volume.     The wound was irrigated and suctioned one last time. The alfredo was removed from the disk space. The retractor was closed somewhat. There was no excessive bleeding noted within the psoas. Hanh powder was placed over the insertion site of the spacer. A mixture of Marcaine and Decadron was placed over the powder and then the retractor was removed. The peritoneal contents were allowed to fall back into their native position.     Through the same incision, the next level (L34) was targeted using the same methods as described above to dock the retractor in position. Once it was confirmed on both nerve monitoring and  AP & Lateral C-arm images to be in satisfactory position, the intradiscal alfredo was introduced into the disc space from the posterior blade, and the retractor was opened.  Excellent visualization of the disc space was achieved.      A 15-blade scalpel was then used once again to create a large annulotomy and through this annulotomy Martin elevators were used to separate the cartilaginous portion of the end plates from the bony end plates and then to release the  contralateral annulus.  The disc material was removed entirely and the disc space was prepared for fusion with the pituitaries, rasps and various curettes from the disk prep set. Once this was completed and the cartilage had been removed from the end plates, the disk space was irrigated and suctioned.    The trial spacer was placed into the disk space. It was found that a [id number] mm  spacer was the appropriate size spacer. A titanium, K2M spacer of this size was then obtained and packed with a mixture of autograft and allograft bone. The spacer was tamped to the appropriate depth and then the insertion handle was removed. The C-arm fluoroscopy images showed satisfactory positioning of the implant and excellent restoration of the disk space height, anterior column height,  as well as foraminal volume.    The wound was irrigated and suctioned one last time. The alfredo was removed from the disk space. The retractor was closed somewhat. There was no excessive bleeding noted within the psoas. Hanh powder was placed over the insertion site of the spacer. A mixture of Marcaine and Decadron was placed over the powder and then the retractor was removed. The peritoneal contents were allowed to fall back into their native position.     The abdominal oblique muscular layers were reapproximated with 2-0 Vicryl and the subcutaneous skin was reapproximated with 2-0 Vicryl. The skin edges were reapproximated with the Pr[place]o wound closure system, which also served as the dressing once it had time to dry.     The patient was transferred to the supine position on a standard recovery room bed, where he was awakened from general endotracheal anesthesia, extubated and taken to the recovery room in satisfactory condition. There were no complications. All counts were correct at the end of the case.

## 2018-01-17 NOTE — PLAN OF CARE
Problem: Patient Care Overview (Adult)  Goal: Plan of Care Review  Outcome: Ongoing (interventions implemented as appropriate)   01/17/18 1866   Coping/Psychosocial Response Interventions   Plan Of Care Reviewed With patient   Patient Care Overview   Progress improving   Outcome Evaluation   Outcome Summary/Follow up Plan pain controlled by medications, meets criteria for discharge from PACU       Problem: Perioperative Period (Adult)  Goal: Signs and Symptoms of Listed Potential Problems Will be Absent or Manageable (Perioperative Period)  Outcome: Ongoing (interventions implemented as appropriate)

## 2018-01-18 ENCOUNTER — ANESTHESIA EVENT (OUTPATIENT)
Dept: PERIOP | Facility: HOSPITAL | Age: 63
End: 2018-01-18

## 2018-01-18 LAB
ANION GAP SERPL CALCULATED.3IONS-SCNC: 9 MMOL/L (ref 4–13)
BASOPHILS # BLD AUTO: 0.01 10*3/MM3 (ref 0–0.2)
BASOPHILS NFR BLD AUTO: 0.2 % (ref 0–2)
BUN BLD-MCNC: 19 MG/DL (ref 5–21)
BUN/CREAT SERPL: 28.4 (ref 7–25)
CALCIUM SPEC-SCNC: 8.4 MG/DL (ref 8.4–10.4)
CHLORIDE SERPL-SCNC: 101 MMOL/L (ref 98–110)
CO2 SERPL-SCNC: 29 MMOL/L (ref 24–31)
CREAT BLD-MCNC: 0.67 MG/DL (ref 0.5–1.4)
DEPRECATED RDW RBC AUTO: 51.3 FL (ref 40–54)
EOSINOPHIL # BLD AUTO: 0 10*3/MM3 (ref 0–0.7)
EOSINOPHIL NFR BLD AUTO: 0 % (ref 0–4)
ERYTHROCYTE [DISTWIDTH] IN BLOOD BY AUTOMATED COUNT: 15.8 % (ref 12–15)
GFR SERPL CREATININE-BSD FRML MDRD: 89 ML/MIN/1.73
GLUCOSE BLD-MCNC: 207 MG/DL (ref 70–100)
HCT VFR BLD AUTO: 36 % (ref 37–47)
HGB BLD-MCNC: 11.9 G/DL (ref 12–16)
IMM GRANULOCYTES # BLD: 0.02 10*3/MM3 (ref 0–0.03)
IMM GRANULOCYTES NFR BLD: 0.3 % (ref 0–5)
LYMPHOCYTES # BLD AUTO: 1.25 10*3/MM3 (ref 0.72–4.86)
LYMPHOCYTES NFR BLD AUTO: 19.4 % (ref 15–45)
MCH RBC QN AUTO: 29.7 PG (ref 28–32)
MCHC RBC AUTO-ENTMCNC: 33.1 G/DL (ref 33–36)
MCV RBC AUTO: 89.8 FL (ref 82–98)
MONOCYTES # BLD AUTO: 0.19 10*3/MM3 (ref 0.19–1.3)
MONOCYTES NFR BLD AUTO: 3 % (ref 4–12)
NEUTROPHILS # BLD AUTO: 4.96 10*3/MM3 (ref 1.87–8.4)
NEUTROPHILS NFR BLD AUTO: 77.1 % (ref 39–78)
NRBC BLD MANUAL-RTO: 0 /100 WBC (ref 0–0)
PLATELET # BLD AUTO: 155 10*3/MM3 (ref 130–400)
PMV BLD AUTO: 11.8 FL (ref 6–12)
POTASSIUM BLD-SCNC: 4.1 MMOL/L (ref 3.5–5.3)
RBC # BLD AUTO: 4.01 10*6/MM3 (ref 4.2–5.4)
SODIUM BLD-SCNC: 139 MMOL/L (ref 135–145)
WBC NRBC COR # BLD: 6.43 10*3/MM3 (ref 4.8–10.8)

## 2018-01-18 PROCEDURE — 97166 OT EVAL MOD COMPLEX 45 MIN: CPT

## 2018-01-18 PROCEDURE — G8979 MOBILITY GOAL STATUS: HCPCS | Performed by: PHYSICAL THERAPIST

## 2018-01-18 PROCEDURE — G8987 SELF CARE CURRENT STATUS: HCPCS

## 2018-01-18 PROCEDURE — 97116 GAIT TRAINING THERAPY: CPT

## 2018-01-18 PROCEDURE — G8988 SELF CARE GOAL STATUS: HCPCS

## 2018-01-18 PROCEDURE — 85025 COMPLETE CBC W/AUTO DIFF WBC: CPT

## 2018-01-18 PROCEDURE — 80048 BASIC METABOLIC PNL TOTAL CA: CPT

## 2018-01-18 PROCEDURE — 25010000003 CEFAZOLIN PER 500 MG

## 2018-01-18 PROCEDURE — 63710000001 PREDNISONE PER 5 MG

## 2018-01-18 PROCEDURE — 97162 PT EVAL MOD COMPLEX 30 MIN: CPT | Performed by: PHYSICAL THERAPIST

## 2018-01-18 PROCEDURE — G8978 MOBILITY CURRENT STATUS: HCPCS | Performed by: PHYSICAL THERAPIST

## 2018-01-18 PROCEDURE — 97530 THERAPEUTIC ACTIVITIES: CPT

## 2018-01-18 RX ORDER — SODIUM CHLORIDE 0.9 % (FLUSH) 0.9 %
1-10 SYRINGE (ML) INJECTION AS NEEDED
Status: CANCELLED | OUTPATIENT
Start: 2018-01-18

## 2018-01-18 RX ORDER — LANOLIN ALCOHOL/MO/W.PET/CERES
3 CREAM (GRAM) TOPICAL NIGHTLY PRN
Status: DISCONTINUED | OUTPATIENT
Start: 2018-01-18 | End: 2018-01-22 | Stop reason: HOSPADM

## 2018-01-18 RX ORDER — SODIUM CHLORIDE, SODIUM LACTATE, POTASSIUM CHLORIDE, CALCIUM CHLORIDE 600; 310; 30; 20 MG/100ML; MG/100ML; MG/100ML; MG/100ML
30 INJECTION, SOLUTION INTRAVENOUS CONTINUOUS
Status: CANCELLED | OUTPATIENT
Start: 2018-01-18

## 2018-01-18 RX ADMIN — CLONIDINE HYDROCHLORIDE 0.2 MG: 0.2 TABLET ORAL at 20:08

## 2018-01-18 RX ADMIN — ISOSORBIDE MONONITRATE 30 MG: 30 TABLET, EXTENDED RELEASE ORAL at 08:03

## 2018-01-18 RX ADMIN — PREGABALIN 50 MG: 50 CAPSULE ORAL at 08:03

## 2018-01-18 RX ADMIN — CYCLOBENZAPRINE HYDROCHLORIDE 10 MG: 10 TABLET, FILM COATED ORAL at 17:18

## 2018-01-18 RX ADMIN — SODIUM CHLORIDE 50 ML/HR: 9 INJECTION, SOLUTION INTRAVENOUS at 06:45

## 2018-01-18 RX ADMIN — OSELTAMIVIR PHOSPHATE 75 MG: 75 CAPSULE ORAL at 08:05

## 2018-01-18 RX ADMIN — CARVEDILOL 12.5 MG: 6.25 TABLET, FILM COATED ORAL at 17:14

## 2018-01-18 RX ADMIN — OXYCODONE HYDROCHLORIDE AND ACETAMINOPHEN 2 TABLET: 7.5; 325 TABLET ORAL at 14:13

## 2018-01-18 RX ADMIN — LEVOTHYROXINE SODIUM 75 MCG: 75 TABLET ORAL at 06:34

## 2018-01-18 RX ADMIN — CEFAZOLIN 1 G: 1 INJECTION, POWDER, FOR SOLUTION INTRAMUSCULAR; INTRAVENOUS at 06:34

## 2018-01-18 RX ADMIN — OXYCODONE HYDROCHLORIDE AND ACETAMINOPHEN 2 TABLET: 7.5; 325 TABLET ORAL at 08:03

## 2018-01-18 RX ADMIN — PREDNISONE 2 MG: 1 TABLET ORAL at 09:54

## 2018-01-18 RX ADMIN — VALACYCLOVIR 500 MG: 500 TABLET, FILM COATED ORAL at 08:04

## 2018-01-18 RX ADMIN — OXYCODONE HYDROCHLORIDE AND ACETAMINOPHEN 2 TABLET: 7.5; 325 TABLET ORAL at 03:13

## 2018-01-18 RX ADMIN — FLUTICASONE PROPIONATE 2 SPRAY: 50 SPRAY, METERED NASAL at 09:54

## 2018-01-18 RX ADMIN — FUROSEMIDE 40 MG: 40 TABLET ORAL at 08:05

## 2018-01-18 RX ADMIN — CETIRIZINE HYDROCHLORIDE 10 MG: 10 TABLET, FILM COATED ORAL at 08:05

## 2018-01-18 RX ADMIN — CARVEDILOL 12.5 MG: 6.25 TABLET, FILM COATED ORAL at 08:04

## 2018-01-18 RX ADMIN — SPIRONOLACTONE 25 MG: 25 TABLET ORAL at 08:04

## 2018-01-18 RX ADMIN — PANTOPRAZOLE SODIUM 40 MG: 40 TABLET, DELAYED RELEASE ORAL at 08:03

## 2018-01-18 RX ADMIN — OXYCODONE HYDROCHLORIDE AND ACETAMINOPHEN 2 TABLET: 7.5; 325 TABLET ORAL at 19:35

## 2018-01-18 RX ADMIN — FOLIC ACID 1 MG: 1 TABLET ORAL at 08:05

## 2018-01-18 NOTE — PLAN OF CARE
Problem: Patient Care Overview (Adult)  Goal: Plan of Care Review  Outcome: Ongoing (interventions implemented as appropriate)   01/18/18 0843   Coping/Psychosocial Response Interventions   Plan Of Care Reviewed With patient   Patient Care Overview   Progress no change   Outcome Evaluation   Outcome Summary/Follow up Plan OT eval completed. Pt rolls L with CGA and verbal cues with use of bed rail, able to come into sitting with CGA. ModA needed for LB dressing, CGA for hygiene after toileting using BSC. Pt transfers with minAx2 with severe buckling of LLE while WBing and ambulating. Pt required use of rw to support herself and decrease risk of falls. She transferred to BSC and then to chair with minAx2. Pt does better if she's given the opportunity to transfer toward her R. Pt requires skilled OT to address decreased ADL function, imbalance, weakness, decreased safety and knowledge deficit related to spine surgery. She already owns LSO however she did not currently have it available during eval. Plans to bring it later today. 2nd stage sx also planned for 1/19/18. Anticipate discharge home with 24/7 supervision. Pt would benefit from shower chair and possible AE at discharge.        Problem: Inpatient Occupational Therapy  Goal: Transfer Training Goal 1 LTG- OT  Outcome: Ongoing (interventions implemented as appropriate)   01/18/18 0843   Transfer Training OT LTG   Transfer Training OT LTG, Date Established 01/18/18   Transfer Training OT LTG, Time to Achieve by discharge   Transfer Training OT LTG, Activity Type shower chair;walk-in shower;toilet   Transfer Training OT LTG, [place] Level contact guard assist   Transfer Training OT LTG, Assist Device walker, rolling;commode, bedside;shower chair     Goal: Bathing Goal LTG- OT  Outcome: Ongoing (interventions implemented as appropriate)   01/18/18 0843   Bathing OT LTG   Bathing Goal OT LTG, Date Established 01/18/18   Bathing Goal OT LTG, Time to Achieve by  discharge   Bathing Goal OT LTG, Activity Type lower body bathing   Bathing Goal OT LTG, [place] Level supervision required   Bathing Goal OT LTG, Assist Device shower chair     Goal: LB Dressing Goal LTG- OT  Outcome: Ongoing (interventions implemented as appropriate)   01/18/18 0843   LB Dressing OT LTG   LB Dressing Goal OT LTG, Date Established 01/18/18   LB Dressing Goal OT LTG, Time to Achieve by discharge   LB Dressing Goal OT LTG, [place] Level contact guard assist   LB Dressing Goal OT LTG, Additional Goal AE PRN

## 2018-01-18 NOTE — THERAPY EVALUATION
Acute Care - Physical Therapy Initial Evaluation  [facility]     Patient Name: Tawny Shin  : 1955  MRN: [id number]  Today's Date: 2018   Onset of Illness/Injury or Date of Surgery Date: 18  Date of Referral to PT: 18  Referring Physician: Dr. Oropeza      Admit Date: 2018     Visit Dx:    ICD-10-CM ICD-9-CM   1. Impaired mobility and ADLs Z74. 799.89   2. Impaired mobility Z74. 799.89     Patient Active Problem List   Diagnosis   • Eustachian tube dysfunction   • Sensorineural hearing loss   • Lumbago of multiple sites in spine with sciatica   • Spondylolisthesis of lumbar region     Past Medical History:   Diagnosis Date   • Arthritis    • Asthma    • Chronic sinusitis    • Eustachian tube dysfunction    • Heart disease    • Herpes simplex    • Hypertension    • Laryngitis sicca    • Laryngitis, chronic    • MI (myocardial infarction)    • Otorrhea    • Sensorineural hearing loss    • Sjogren's disease      Past Surgical History:   Procedure Laterality Date   • A-V CARDIAC PACEMAKER INSERTION     • ATRIAL CARDIAC PACEMAKER INSERTION     • CORONARY ANGIOPLASTY WITH STENT PLACEMENT      X 2;  &    • MYRINGOTOMY W/ TUBES  2014    TUBES NO LONGER IN PLACE          PT ASSESSMENT (last 72 hours)      PT Evaluation       18 0800 18 0739    Rehab Evaluation    Document Type  evaluation  -MS    Subjective Information  agree to therapy;complains of;weakness;pain   L hip flex weakness  -MS    General Information    Patient Profile Review  yes  -MS    Onset of Illness/Injury or Date of Surgery Date  18  -MS    Referring Physician  Dr. Oropeza  -MS    General Observations  pt lying in bed, awake and alert in no apparent distress  -MS    Pertinent History Of Current Problem  R sided back pain, buttock and leg pain; Dx: lumbar stenosis L3-5, DDD, lumbar radiculopathy, L4-5 disc herniation w/extruded fragment, spondylolisthesis L3-5 s/p L sided LLIF via  retroperitoneal approach on 1/17/18  -MS    Precautions/Limitations  brace on when up;fall precautions;spinal precautions   L leg buddy  -MS    Plans/Goals Discussed With  patient;agreed upon  -MS    Risks Reviewed  patient:;LOB;nausea/vomiting;dizziness;increased discomfort  -MS    Benefits Reviewed  patient:;improve function;increase independence;increase strength;increase balance;decrease pain;increase knowledge  -MS    Barriers to Rehab  physical barrier  -MS    Clinical Impression    Date of Referral to PT  01/17/18  -MS    Patient/Family Goals Statement  walk and move left leg  -MS    Criteria for Skilled Therapeutic Interventions Met  yes;treatment indicated  -MS    Pathology/Pathophysiology Noted (Describe Specifically for Each System)  musculoskeletal  -MS    Impairments Found (describe specific impairments)  gait, locomotion, and balance;muscle performance;ROM;sensory Integrity  -MS    Rehab Potential  good, to achieve stated therapy goals  -MS    Predicted Duration of Therapy Intervention (days/wks)  until discharge  -MS    Vital Signs    Pre SpO2 (%)  99  -MS    O2 Delivery Pre Treatment  supplemental O2   3L  -MS    Intra SpO2 (%)  94  -MS    O2 Delivery Intra Treatment  room air  -MS    Post SpO2 (%)  97  -MS    O2 Delivery Post Treatment  room air  -MS    Pre Patient Position  Supine  -MS    Intra Patient Position  Sitting  -MS    Post Patient Position  Sitting  -MS    Pain Assessment    Pain Assessment  0-10  -MS    Pain Score  4  -MS    Pain Type  Acute pain;Surgical pain  -MS    Pain Location  Back  -MS    Pain Orientation  Left  -MS    Pain Radiating Towards  L flank, L thigh  -MS    Pain Descriptors  Aching;Throbbing  -MS    Pain Intervention(s)  Medication (See MAR);Repositioned;Ambulation/increased activity  -MS    Response to Interventions  reports increase in pain  -MS    Cognitive Assessment/Intervention    Current Cognitive/Communication Assessment  functional  -MS    Orientation Status   oriented x 4  -MS    Follows Commands/Answers Questions  100% of the time;able to follow single-step instructions  -MS    Personal Safety  WNL/WFL  -MS    Personal Safety Interventions  fall prevention program maintained;gait belt;muscle strengthening facilitated;nonskid shoes/slippers when out of bed;supervised activity  -MS    ROM (Range of Motion)    General ROM  lower extremity range of motion deficits identified  -MS    General LE Assessment    ROM Detail  L hip flexion impaired 75%  -MS    MMT (Manual Muscle Testing)    General MMT Assessment  lower extremity strength deficits identified  -MS    Lower Extremity    Lower Ext Manual Muscle Testing Detail  L hip flexion 2/5  -MS    Bed Mobility, Assessment/Treatment    Bed Mobility, Assistive Device  bed rails  -MS    Bed Mobility, Roll Left, Ray  contact guard assist;verbal cues required;nonverbal cues required (demo/gesture)  -MS    Bed Mob, Sidelying to Sit, Ray  minimum assist (75% patient effort);verbal cues required;nonverbal cues required (demo/gesture)   min A for L LE for hip flexion  -MS    Bed Mobility, Impairments  strength decreased  -MS    Transfer Assessment/Treatment    Transfers, Bed-Chair Ray  minimum assist (75% patient effort);2 person assist required;verbal cues required;nonverbal cues required (demo/gesture)   bed to BSC  -MS    Transfers, Chair-Bed Ray  minimum assist (75% patient effort);2 person assist required;verbal cues required;nonverbal cues required (demo/gesture)   BSC to chair  -MS    Transfers, Sit-Stand [place]  minimum assist (75% patient effort);2 person assist required;verbal cues required;nonverbal cues required (demo/gesture)  -MS    Transfers, Stand-Sit Ray  minimum assist (75% patient effort);2 person assist required;verbal cues required;nonverbal cues required (demo/gesture)  -MS    Transfers, Sit-Stand-Sit, Assist Device  rolling walker   once HHA x2 and once with RW   "-MS    Transfer, Safety Issues  knees buckling;step length decreased;weight-shifting ability decreased   L leg buckling  -MS    Transfer, Impairments  ROM decreased;sensation decreased;strength decreased;impaired balance;motor control impaired;pain  -MS    Transfer, Comment  pt required blocking of her L leg to prevent buckling during her first sit to stand. She was able to use a walker for her second sit to stand without leg blocking  -MS    Gait Assessment/Treatment    Gait, Comment  unable to due to L hip flexion weakness  -MS    Motor Skills/Interventions    Additional Documentation  Balance Skills Training (Group)  -MS    Balance Skills Training    Sitting-Level of Assistance  Independent  -MS    Sitting-Balance Support  Feet supported  -MS    Standing-Level of Assistance  Contact guard;x2  -MS    Static Standing Balance Support  assistive device  -MS    Standing Balance # of Minutes  CGA x2 with L knee was in full extension but min A when knee is bent  -MS    Gait Balance-Level of Assistance  Minimum assistance;x2  -MS    Gait Balance Support  assistive device  -MS    Orthotics Prosthetics    Additional Documentation  Orthosis Location (Group)  -MS    Orthosis Location    Orthosis Location/Type  neck/back  -MS    Orthosis, Neck/Back  LSO (lumbar sacral orthosis)  -MS    Orthosis Management/Training    Orthosis Fabrication Detail  pt owns LSO however it is not present at this time. Her  is bringing it today  -MS    Sensory Assessment/Intervention    Light Touch  LLE  -MS    LLE Light Touch mild impairment  -SC mild impairment   L3 dermatome \"dull\"  -MS    Positioning and Restraints    Post Treatment Position  chair  -MS    In Chair  sitting;call light within reach;encouraged to call for assist;with family/caregiver;notified ns  -MS      01/18/18 0738 01/17/18 2156    Rehab Evaluation    Document Type evaluation  -AC     Subjective Information agree to therapy;complains of;pain  -AC     Symptoms Noted " Comment LLE buddy frequently with WBing/ambulation  -     General Information    Patient Profile Review yes  -AC     Onset of Illness/Injury or Date of Surgery Date 01/17/18  -     Referring Physician Dr. Oropeza  -     General Observations lying supine in bed, no apparent distress, 3L O2 per nc  -AC     Pertinent History Of Current Problem R sided back pain, buttock and leg pain; Dx: lumbar stenosis L3-5, DDD, lumbar radiculopathy, L4-5 disc herniation w/extruded fragment, spondylolisthesis L3-5 s/p L sided LLIF via retroperitoneal approach on 1/17/18 (XR L spine)  -AC     Precautions/Limitations brace on when up;spinal precautions  -AC     Prior Level of Function independent:;all household mobility;community mobility;gait;transfer;ADL's;home management;cooking;cleaning;driving;shopping  -AC     Equipment Currently Used at Home cane, straight;walker, rolling;raised toilet  -     Plans/Goals Discussed With patient;agreed upon  -     Risks Reviewed patient:;LOB;nausea/vomiting;dizziness;increased discomfort;change in vital signs;increased drainage;lines disloged  -AC     Benefits Reviewed patient:;improve function;increase independence;increase strength;increase balance;decrease pain;decrease risk of DVT;improve skin integrity;increase knowledge  -AC     Barriers to Rehab none identified  -AC     Living Environment    Lives With spouse  -AC spouse  -AB    Living Arrangements house  -AC house  -AB    Home Accessibility bed and bath on same level;stairs to enter home;stairs within home;other (see comments)   walk in shower  -AC no concerns  -AB    Number of Stairs to Enter Home 2  -AC     Number of Stairs Within Home 11  -AC     Stair Railings at Home inside, present on left side  -AC none  -AB    Type of Financial/Environmental Concern  none  -AB    Transportation Available  car  -AB    Living Environment Comment spouse available evenings  -AC     Vital Signs    Pre Systolic BP Rehab 130  -AC     Pre  Treatment Diastolic BP 66  -AC     Pre SpO2 (%) 99  -AC     O2 Delivery Pre Treatment supplemental O2   3L  -AC     Pre Patient Position Supine  -AC     Pain Assessment    Pain Assessment 0-10  -AC     Pain Score 4  -AC     Pain Type Acute pain;Surgical pain  -AC     Pain Location Back  -AC     Pain Orientation Left  -AC     Pain Radiating Towards L flank, L thigh  -AC     Pain Descriptors Aching;Throbbing  -AC     Pain Intervention(s) Medication (See MAR);Repositioned;Ambulation/increased activity  -AC     Response to Interventions tolerated  -AC     Cognitive Assessment/Intervention    Current Cognitive/Communication Assessment functional  -AC     Orientation Status oriented x 4  -AC     Follows Commands/Answers Questions 100% of the time;able to follow multi-step instructions  -AC     Personal Safety WNL/WFL  -AC     Personal Safety Interventions fall prevention program maintained;gait belt;nonskid shoes/slippers when out of bed;supervised activity;elopement precautions initiated  -AC     ROM (Range of Motion)    General ROM Detail WFL AROM BUE  -AC     MMT (Manual Muscle Testing)    General MMT Assessment Detail functionally 4+/5 BUE  -AC     Bed Mobility, Assessment/Treatment    Bed Mobility, Assistive Device bed rails  -AC     Bed Mobility, Roll Right, [place] supervision required;verbal cues required  -AC     Bed Mob, Sidelying to Sit, [place] contact guard assist;verbal cues required  -AC     Bed Mobility, Impairments pain  -AC     Transfer Assessment/Treatment    Transfers, Sit-Stand [place] minimum assist (75% patient effort);2 person assist required;verbal cues required  -AC     Transfers, Stand-Sit [place] minimum assist (75% patient effort);verbal cues required  -AC     Transfers, Sit-Stand-Sit, Assist Device rolling walker  -AC     Transfer, Safety Issues knees buckling;other (see comments)   L knee  -AC     Transfer, Impairments strength decreased  -AC     Motor  Skills/Interventions    Additional Documentation Balance Skills Training (Group)  -     Balance Skills Training    Sitting-Level of Assistance Independent  -     Sitting-Balance Support Feet supported  -     Standing-Level of Assistance Contact guard;x2  -AC     Static Standing Balance Support assistive device  -     Standing Balance # of Minutes able to stand with CGAx2 when using rw for UE suppot  -     Gait Balance-Level of Assistance Minimum assistance;x2  -AC     Gait Balance Support assistive device  -     Orthotics Prosthetics    Additional Documentation Orthosis Location (Group);Orthosis Management/Training (Group)  -     Orthosis Location    Orthosis Location/Type neck/back  -     Orthosis, Neck/Back LSO (lumbar sacral orthosis)  -     Orthosis Management/Training    Orthosis Fabrication Detail pt already owns LSO but does not have it with her for eval, family plans to bring it this morning  -     Orthosis Skills Training activity limitations;restrictions/precautions  -     Sensory Assessment/Intervention    Sensory Impairment numbness   c/o numbness in L calf once up EOB  -     Positioning and Restraints    Pre-Treatment Position in bed  -     Post Treatment Position chair  -     In Chair sitting;call light within reach;encouraged to call for assist;with family/caregiver  -       01/17/18 1723       General Information    Equipment Currently Used at Home none  -AB       User Key  (r) = Recorded By, (t) = Taken By, (c) = Cosigned By    Initials Name Provider Type     Sebastián Howe, OTR/L Occupational Therapist    MS Nicole Olson, PT DPT Physical Therapist    AB Ximena Resendez, RN Registered Nurse    JANET Whitmore, RN Registered Nurse          Physical Therapy Education     Title: PT OT SLP Therapies (Active)     Topic: Physical Therapy (Active)     Point: Mobility training (Done)    Learning Progress Summary    Learner Readiness Method Response Comment Documented by  Status   Patient Acceptance E VU spinal precautions, exercises for L hip flexion, use of brace once available MS 01/18/18 0854 Done               Point: Precautions (Done)    Learning Progress Summary    Learner Readiness Method Response Comment Documented by Status   Patient Acceptance E VU spinal precautions, exercises for L hip flexion, use of brace once available MS 01/18/18 0854 Done                      User Key     Initials Effective Dates Name Provider Type Discipline    MS 08/02/16 -  Nicole Olson, PT DPT Physical Therapist PT                PT Recommendation and Plan  Anticipated Discharge Disposition: home with assist  Planned Therapy Interventions: balance training, bed mobility training, gait training, home exercise program, orthotic fitting/training, patient/family education, ROM (Range of Motion), strengthening, transfer training  PT Frequency: 2 times/day  Plan of Care Review  Plan Of Care Reviewed With: patient  Progress: progress toward functional goals as expected  Outcome Summary/Follow up Plan: PT evaluation completed. The patient demonstrates a significant left hip flexion weakness most likely from the lateral approach of the surgery. This weakness limited her ability to stand and attempt to take steps. Her left leg would buckle and she had difficulty with holding herself up with a walker. She does have minimal sensory deficit in the L3 dermatome. No reports of significant pain and LSO not present at this time.  to bring LSO later today. PT will continue to work with the patient to improve her left hip flexion and overall mobility. Assessment for discharge planning will be done after her second surgery on Friday.           IP PT Goals       01/18/18 1133          Bed Mobility PT LTG    Bed Mobility PT LTG, Date Established 01/18/18  -MS      Bed Mobility PT LTG, Time to Achieve by discharge  -MS      Bed Mobility PT LTG, Activity Type all bed mobility  -MS      Bed Mobility PT LTG,  [place] Level independent  -MS      Transfer Training PT LTG    Transfer Training PT LTG, Date Established 01/18/18  -MS      Transfer Training PT LTG, Time to Achieve by discharge  -MS      Transfer Training PT LTG, Activity Type sit to stand/stand to sit  -MS      Transfer Training PT LTG, [place] Level conditional independence  -MS      Transfer Training PT LTG, Assist Device walker, rolling  -MS      Gait Training PT LTG    Gait Training Goal PT LTG, Date Established 01/18/18  -MS      Gait Training Goal PT LTG, Time to Achieve by discharge  -MS      Gait Training Goal PT LTG, [place] Level contact guard assist  -MS      Gait Training Goal PT LTG, Assist Device walker, rolling  -MS      Gait Training Goal PT LTG, Distance to Achieve 50ft clearing L foot 50% of the time  -MS      Stair Training PT LTG    Stair Training Goal PT LTG, Date Established 01/18/18  -MS      Stair Training Goal PT LTG, Time to Achieve by discharge  -MS      Stair Training Goal PT LTG, Number of Steps 3  -MS      Stair Training Goal PT LTG, [place] Level contact guard assist  -MS      Stair Training Goal PT LTG, Assist Device 1 handrail  -MS      Strength Goal PT LTG    Strength Goal PT LTG, Date Established 01/18/18  -MS      Strength Goal PT LTG, Time to Achieve by discharge  -MS      Strength Goal PT LTG, Functional Goal pt will demonstrate full AROM of left hip flexion  -MS        User Key  (r) = Recorded By, (t) = Taken By, (c) = Cosigned By    Initials Name Provider Type    MS Nicole Olson, PT DPT Physical Therapist                Outcome Measures       01/18/18 0841 01/18/18 0739       How much help from another person do you currently need...    Turning from your back to your side while in flat bed without using bedrails?  3  -MS     Moving from lying on back to sitting on the side of a flat bed without bedrails?  3  -MS     Moving to and from a bed to a chair (including a wheelchair)?  3  -MS     Standing  up from a chair using your arms (e.g., wheelchair, bedside chair)?  3  -MS     Climbing 3-5 steps with a railing?  1  -MS     To walk in hospital room?  1  -MS     AM-PAC 6 Clicks Score  14  -MS     How much help from another is currently needed...    Putting on and taking off regular lower body clothing? 2  -AC      Bathing (including washing, rinsing, and drying) 2  -AC      Toileting (which includes using toilet bed pan or urinal) 3  -AC      Putting on and taking off regular upper body clothing 4  -AC      Taking care of personal grooming (such as brushing teeth) 3  -AC      Eating meals 4  -AC      Score 18  -AC      Functional Assessment    Outcome Measure Options AM-PAC 6 Clicks Daily Activity (OT)  -AC AM-PAC 6 Clicks Basic Mobility (PT)  -MS       User Key  (r) = Recorded By, (t) = Taken By, (c) = Cosigned By    Initials Name Provider Type    AC Sebastián Howe, OTR/L Occupational Therapist    MS Nicole Olson, PT DPT Physical Therapist           Time Calculation:         PT Charges       01/18/18 1138          Time Calculation    Start Time 0739  -MS      Stop Time 0825  -MS      Time Calculation (min) 46 min  -MS      PT Received On 01/18/18  -MS      PT Goal Re-Cert Due Date 01/28/18  -MS        User Key  (r) = Recorded By, (t) = Taken By, (c) = Cosigned By    Initials Name Provider Type    MS Nicole Olson, PT DPT Physical Therapist          Therapy Charges for Today     Code Description Service Date Service Provider Modifiers Qty    61331698794 HC PT MOBILITY CURRENT 1/18/2018 Nicole Olson, PT DPT GP, CK 1    33883278315 HC PT MOBILITY PROJECTED 1/18/2018 Nicole Olson, PT DPT GP, CJ 1    [id number] HC PT EVAL MOD COMPLEXITY 4 1/18/2018 Nicole Olson, PT DPT GP, KX 1          PT G-Codes  Outcome Measure Options: AM-PAC 6 Clicks Daily Activity (OT)  Score: 14  Functional Limitation: Mobility: Walking and moving around  Mobility: Walking and Moving Around Current Status (): At least 40  percent but less than 60 percent impaired, limited or restricted  Mobility: Walking and Moving Around Goal Status (): At least 20 percent but less than 40 percent impaired, limited or restricted      Nicole Olson, PT DPT  1/18/2018

## 2018-01-18 NOTE — PLAN OF CARE
Problem: Patient Care Overview (Adult)  Goal: Plan of Care Review  Outcome: Ongoing (interventions implemented as appropriate)   01/18/18 0356   Coping/Psychosocial Response Interventions   Plan Of Care Reviewed With patient   Patient Care Overview   Progress no change   Outcome Evaluation   Outcome Summary/Follow up Plan Patient c/o pain, prn pain meds given as ordered. Prineo dressing to left flanl CDI. No neuro changes A&O x4. VSS, Safety maintained.       Problem: Laminectomy/Foraminotomy/Discectomy (Adult)  Goal: Signs and Symptoms of Listed Potential Problems Will be Absent or Manageable (Laminectomy/Foraminotomy/Discectomy)  Outcome: Ongoing (interventions implemented as appropriate)      Problem: Fall Risk (Adult)  Goal: Identify Related Risk Factors and Signs and Symptoms  Outcome: Outcome(s) achieved Date Met: 01/18/18    Goal: Absence of Falls  Outcome: Ongoing (interventions implemented as appropriate)

## 2018-01-18 NOTE — PLAN OF CARE
Problem: Patient Care Overview (Adult)  Goal: Plan of Care Review  Outcome: Ongoing (interventions implemented as appropriate)   01/18/18 1133   Coping/Psychosocial Response Interventions   Plan Of Care Reviewed With patient   Patient Care Overview   Progress progress toward functional goals as expected   Outcome Evaluation   Outcome Summary/Follow up Plan PT evaluation completed. The patient demonstrates a significant left hip flexion weakness most likely from the lateral approach of the surgery. This weakness limited her ability to stand and attempt to take steps. Her left leg would buckle and she had difficulty with holding herself up with a walker. She does have minimal sensory deficit in the L3 dermatome. No reports of significant pain and LSO not present at this time.  to bring LSO later today. PT will continue to work with the patient to improve her left hip flexion and overall mobility. Assessment for discharge planning will be done after her second surgery on Friday.        Problem: Inpatient Physical Therapy  Goal: Bed Mobility Goal LTG- PT  Outcome: Ongoing (interventions implemented as appropriate)   01/18/18 1133   Bed Mobility PT LTG   Bed Mobility PT LTG, Date Established 01/18/18   Bed Mobility PT LTG, Time to Achieve by discharge   Bed Mobility PT LTG, Activity Type all bed mobility   Bed Mobility PT LTG, [place] Level independent     Goal: Transfer Training Goal 1 LTG- PT  Outcome: Ongoing (interventions implemented as appropriate)   01/18/18 1133   Transfer Training PT LTG   Transfer Training PT LTG, Date Established 01/18/18   Transfer Training PT LTG, Time to Achieve by discharge   Transfer Training PT LTG, Activity Type sit to stand/stand to sit   Transfer Training PT LTG, [place] Level conditional independence   Transfer Training PT LTG, Assist Device walker, rolling     Goal: Gait Training Goal LTG- PT  Outcome: Ongoing (interventions implemented as appropriate)   01/18/18  1133   Gait Training PT LTG   Gait Training Goal PT LTG, Date Established 01/18/18   Gait Training Goal PT LTG, Time to Achieve by discharge   Gait Training Goal PT LTG, [place] Level contact guard assist   Gait Training Goal PT LTG, Assist Device walker, rolling   Gait Training Goal PT LTG, Distance to Achieve 50ft clearing L foot 50% of the time     Goal: Stair Training Goal LTG- PT  Outcome: Ongoing (interventions implemented as appropriate)   01/18/18 1133   Stair Training PT LTG   Stair Training Goal PT LTG, Date Established 01/18/18   Stair Training Goal PT LTG, Time to Achieve by discharge   Stair Training Goal PT LTG, Number of Steps 3   Stair Training Goal PT LTG, [place] Level contact guard assist   Stair Training Goal PT LTG, Assist Device 1 handrail     Goal: Strength Goal LTG- PT  Outcome: Ongoing (interventions implemented as appropriate)   01/18/18 1133   Strength Goal PT LTG   Strength Goal PT LTG, Date Established 01/18/18   Strength Goal PT LTG, Time to Achieve by discharge   Strength Goal PT LTG, Functional Goal pt will demonstrate full AROM of left hip flexion

## 2018-01-18 NOTE — PLAN OF CARE
Problem: Patient Care Overview (Adult)  Goal: Plan of Care Review  Outcome: Ongoing (interventions implemented as appropriate)   01/18/18 1528   Coping/Psychosocial Response Interventions   Plan Of Care Reviewed With patient   Patient Care Overview   Progress improving   Outcome Evaluation   Outcome Summary/Follow up Plan VSS, pt remained up in chair majority of the day, has trasnferred to [facility] [place] [facility] with assist of 2 multiple times, continues to have weakness and buckling to left leg, pain is being controlled with PO PRN pain medication, pt tolerating therapy well, part 2 of surgery in AM, pt is agreeable, multiple family members at the bedside throughout the day, none have any further questions or complaints at this time, will continue to monitor.      Goal: Adult Individualization and Mutuality  Outcome: Ongoing (interventions implemented as appropriate)    Goal: Discharge Needs Assessment  Outcome: Ongoing (interventions implemented as appropriate)      Problem: Laminectomy/Foraminotomy/Discectomy (Adult)  Goal: Signs and Symptoms of Listed Potential Problems Will be Absent or Manageable (Laminectomy/Foraminotomy/Discectomy)  Outcome: Ongoing (interventions implemented as appropriate)      Problem: Fall Risk (Adult)  Goal: Absence of Falls  Outcome: Ongoing (interventions implemented as appropriate)

## 2018-01-18 NOTE — PROGRESS NOTES
Orthopedic Spine Progress Note    Tawny Shin  62 y.o.  female  Subjective     Post-Operative Day # 1    Systemic or Specific Complaints:     Doing ok, pain currently controlled. Pain in left flank,anterior thigh.    Objective     Vital signs in last 24 hours:  Temp:  [96.8 °F (36 °C)-98.1 °F (36.7 °C)] 97.3 °F (36.3 °C)  Heart Rate:  [59-86] 60  Resp:  [12-18] 16  BP: (120-148)/(54-80) 127/60    Physical Exam:    A&O x3  B.LE NVI   Abdomen soft  Dressing CDI    Diagnostic Data:  CBC:  Results from last 7 days  Lab Units 01/18/18  0447   WBC 10*3/mm3 6.43   RBC 10*6/mm3 4.01*   HEMOGLOBIN g/dL 11.9*   HEMATOCRIT % 36.0*   PLATELETS 10*3/mm3 155          Assessment/Plan     1. Status Post LLIF L3-L5      Plan:  1. PT/OT today   2. Continue current pain medications   3. Second stage surgery tomorrow, discussed surgery again today   4. NPO at midnight   5. Consent   5. LSO when OOB      VIRGIL Coy    Date: 1/18/2018  Time: 7:39 AM

## 2018-01-18 NOTE — THERAPY EVALUATION
Acute Care - Occupational Therapy Initial Evaluation  [facility]     Patient Name: Tawny Shin  : 1955  MRN: [id number]  Today's Date: 2018  Onset of Illness/Injury or Date of Surgery Date: (P) 18  Date of Referral to OT: 18  Referring Physician: (P) Dr. Oropeza    Admit Date: 2018       ICD-10-CM ICD-9-CM   1. Impaired mobility and ADLs Z74.09 799.89     Patient Active Problem List   Diagnosis   • Eustachian tube dysfunction   • Sensorineural hearing loss   • Lumbago of multiple sites in spine with sciatica   • Spondylolisthesis of lumbar region     Past Medical History:   Diagnosis Date   • Arthritis    • Asthma    • Chronic sinusitis    • Eustachian tube dysfunction    • Heart disease    • Herpes simplex    • Hypertension    • Laryngitis sicca    • Laryngitis, chronic    • MI (myocardial infarction)    • Otorrhea    • Sensorineural hearing loss    • Sjogren's disease      Past Surgical History:   Procedure Laterality Date   • A-V CARDIAC PACEMAKER INSERTION     • ATRIAL CARDIAC PACEMAKER INSERTION     • CORONARY ANGIOPLASTY WITH STENT PLACEMENT      X 2;  &    • MYRINGOTOMY W/ TUBES  2014    TUBES NO LONGER IN PLACE          OT ASSESSMENT FLOWSHEET (last 72 hours)      OT Evaluation       18 0739 18 0738 18 1744 18 1743 18 1742    Rehab Evaluation    Document Type (P)  evaluation  -MS evaluation  -AC       Subjective Information (P)  agree to therapy;complains of;weakness;pain   L hip flex weakness  -MS agree to therapy;complains of;pain  -AC       Symptoms Noted Comment  LLE buddy frequently with WBing/ambulation  -AC       General Information    Patient Profile Review (P)  yes  -MS yes  -AC       Onset of Illness/Injury or Date of Surgery Date (P)  18  -MS 18  -AC       Referring Physician (P)  Dr. Oropeza  -MS Dr. Oropeza  -AC       General Observations (P)  pt lying in bed, awake and alert in no apparent distress  -MS lying  supine in bed, no apparent distress, 3L O2 per nc  -AC       Pertinent History Of Current Problem (P)  R sided back pain, buttock and leg pain; Dx: lumbar stenosis L3-5, DDD, lumbar radiculopathy, L4-5 disc herniation w/extruded fragment, spondylolisthesis L3-5 s/p L sided LLIF via retroperitoneal approach on 1/17/18  -MS R sided back pain, buttock and leg pain; Dx: lumbar stenosis L3-5, DDD, lumbar radiculopathy, L4-5 disc herniation w/extruded fragment, spondylolisthesis L3-5 s/p L sided LLIF via retroperitoneal approach on 1/17/18 (XR L spine)  -AC       Precautions/Limitations (P)  brace on when up;fall precautions;spinal precautions  -MS brace on when up;spinal precautions  -AC       Prior Level of Function  independent:;all household mobility;community mobility;gait;transfer;ADL's;home management;cooking;cleaning;driving;shopping  -AC       Equipment Currently Used at Home  cane, straight;walker, rolling;raised toilet  -AC  none  -AB     Plans/Goals Discussed With (P)  patient;agreed upon  -MS patient;agreed upon  -AC       Risks Reviewed (P)  patient:;LOB;nausea/vomiting;dizziness;increased discomfort  -MS patient:;LOB;nausea/vomiting;dizziness;increased discomfort;change in vital signs;increased drainage;lines disloged  -AC       Benefits Reviewed (P)  patient:;improve function;increase independence;increase strength;increase balance;decrease pain;increase knowledge  -MS patient:;improve function;increase independence;increase strength;increase balance;decrease pain;decrease risk of DVT;improve skin integrity;increase knowledge  -AC       Barriers to Rehab (P)  physical barrier  -MS none identified  -AC       Living Environment    Lives With  spouse  -AC spouse  -AB      Living Arrangements  house  -AC house  -AB      Home Accessibility  bed and bath on same level;stairs to enter home;stairs within home;other (see comments)   walk in shower  -AC no concerns  -AB      Number of Stairs to Enter Home  2  -AC        Number of Stairs Within Home  11  -AC       Stair Railings at Home  inside, present on left side  -AC none  -AB      Type of Financial/Environmental Concern   none  -AB      Transportation Available   car  -AB      Living Environment Comment  spouse available evenings  -AC       Clinical Impression    Date of Referral to OT  01/17/18  -AC       OT Diagnosis  decreased ADL  -AC       Prognosis  good  -AC       Impairments Found (describe specific impairments)  motor function;muscle performance;gait, locomotion, and balance;ergonomics and body mechanics  -AC       Criteria for Skilled Therapeutic Interventions Met  yes;treatment indicated  -AC       Rehab Potential  good, to achieve stated therapy goals  -AC       Therapy Frequency  3-5 times/wk  -AC       Predicted Duration of Therapy Intervention (days/wks)  until discharge  -AC       Anticipated Discharge Disposition  home with assist  -AC       Functional Level Prior    Ambulation     0-->independent  -AB    Transferring     0-->independent  -AB    Toileting     0-->independent  -AB    Bathing     0-->independent  -AB    Dressing     0-->independent  -AB    Eating     0-->independent  -AB    Communication     0-->understands/communicates without difficulty  -AB    Swallowing     0-->swallows foods/liquids without difficulty  -AB    Vital Signs    Pre Systolic BP Rehab  130  -AC       Pre Treatment Diastolic BP  66  -AC       Pre SpO2 (%) (P)  99  -MS 99  -AC       O2 Delivery Pre Treatment (P)  supplemental O2   3L  -MS supplemental O2   3L  -AC       Intra SpO2 (%) (P)  94  -MS        O2 Delivery Intra Treatment (P)  room air  -MS        Post SpO2 (%) (P)  97  -MS        O2 Delivery Post Treatment (P)  room air  -MS        Pre Patient Position (P)  Supine  -MS Supine  -AC       Intra Patient Position (P)  Sitting  -MS        Post Patient Position (P)  Sitting  -MS        Pain Assessment    Pain Assessment (P)  0-10  -MS 0-10  -AC       Pain Score (P)  4  -MS 4   -AC       Pain Type (P)  Acute pain;Surgical pain  -MS Acute pain;Surgical pain  -AC       Pain Location (P)  Back  -MS Back  -AC       Pain Orientation (P)  Left  -MS Left  -AC       Pain Radiating Towards (P)  L flank, L thigh  -MS L flank, L thigh  -AC       Pain Descriptors (P)  Aching;Throbbing  -MS Aching;Throbbing  -AC       Pain Intervention(s) (P)  Medication (See MAR);Repositioned;Ambulation/increased activity  -MS Medication (See MAR);Repositioned;Ambulation/increased activity  -AC       Response to Interventions (P)  reports increase in pain  -MS tolerated  -AC       Cognitive Assessment/Intervention    Current Cognitive/Communication Assessment (P)  functional  -MS functional  -AC       Orientation Status (P)  oriented x 4  -MS oriented x 4  -AC       Follows Commands/Answers Questions (P)  100% of the time;able to follow single-step instructions  -% of the time;able to follow multi-step instructions  -AC       Personal Safety (P)  WNL/WFL  -MS WNL/WFL  -AC       Personal Safety Interventions (P)  fall prevention program maintained;gait belt;muscle strengthening facilitated;nonskid shoes/slippers when out of bed;supervised activity  -MS fall prevention program maintained;gait belt;nonskid shoes/slippers when out of bed;supervised activity;elopement precautions initiated  -AC       ROM (Range of Motion)    General ROM (P)  lower extremity range of motion deficits identified  -MS        General ROM Detail  WFL AROM BUE  -AC       MMT (Manual Muscle Testing)    General MMT Assessment (P)  lower extremity strength deficits identified  -MS        General MMT Assessment Detail  functionally 4+/5 BUE  -AC       Bed Mobility, Assessment/Treatment    Bed Mobility, Assistive Device (P)  bed rails  -MS bed rails  -AC       Bed Mobility, Roll Left, [employer] (P)  contact guard assist;verbal cues required;nonverbal cues required (demo/gesture)  -MS        Bed Mobility, Roll Right, [employer]  supervision  required;verbal cues required  -AC       Bed Mob, Sidelying to Sit, Reserve (P)  minimum assist (75% patient effort);verbal cues required;nonverbal cues required (demo/gesture)   min A for L LE for hip flexion  -MS contact guard assist;verbal cues required  -AC       Bed Mobility, Impairments (P)  strength decreased  -MS pain  -AC       Transfer Assessment/Treatment    Transfers, Bed-Chair Reserve (P)  minimum assist (75% patient effort);2 person assist required;verbal cues required;nonverbal cues required (demo/gesture)   bed to BSC  -MS        Transfers, Chair-Bed Reserve (P)  minimum assist (75% patient effort);2 person assist required;verbal cues required;nonverbal cues required (demo/gesture)   BSC to chair  -MS        Transfers, Sit-Stand Reserve (P)  minimum assist (75% patient effort);2 person assist required;verbal cues required;nonverbal cues required (demo/gesture)  -MS minimum assist (75% patient effort);2 person assist required;verbal cues required  -AC       Transfers, Stand-Sit Reserve (P)  minimum assist (75% patient effort);2 person assist required;verbal cues required;nonverbal cues required (demo/gesture)  -MS minimum assist (75% patient effort);verbal cues required  -AC       Transfers, Sit-Stand-Sit, Assist Device (P)  rolling walker   once HHA x2 and once with RW  -MS rolling walker  -AC       Transfer, Safety Issues (P)  knees buckling;step length decreased;weight-shifting ability decreased   L leg buckling  -MS knees buckling;other (see comments)   L knee  -AC       Transfer, Impairments (P)  ROM decreased;sensation decreased;strength decreased;impaired balance;motor control impaired;pain  -MS strength decreased  -AC       Transfer, Comment (P)  pt required blocking of her L leg to prevent buckling during her first sit to stand. She was able to use a walker for her second sit to stand without leg blocking  -MS        Functional Mobility    Functional Mobility- Ind.  Level  minimum assist (75% patient effort);2 person assist required;verbal cues required  -       Functional Mobility- Device  rolling walker  -       Functional Mobility- Comment  L knee buddy with ambulation/WBing  -       Lower Body Dressing Assessment/Training    LB Dressing Assess/Train, Clothing Type  donning:;socks   undergarment  -       LB Dressing Assess/Train, Position  sitting  -       LB Dressing Assess/Train, [place]  moderate assist (50% patient effort);verbal cues required  -       LB Dressing Assess/Train, Impairments  strength decreased  -       LB Dressing Assess/Train, Comment  impairment primarily due to L knee buckling upon standing  -       Toileting Assessment/Training    Toileting Assess/Train, Assistive Device  bedside commode  -       Toileting Assess/Train, Position  sitting;supported standing  -       Toileting Assess/Train, Indepen Level  contact guard assist  -       Toileting Assess/Train, Impairments  strength decreased  -       Toileting Assess/Train, Comment  Denis toileting, CGA for hygiene while pt completed half stand  -       Motor Skills/Interventions    Additional Documentation (P)  Balance Skills Training (Group)  -MS Balance Skills Training (Group)  -       Balance Skills Training    Sitting-Level of Assistance  Independent  -       Sitting-Balance Support  Feet supported  -       Standing-Level of Assistance  Contact guard;x2  -       Static Standing Balance Support  assistive device  -       Standing Balance # of Minutes  able to stand with CGAx2 when using rw for UE suppot  -       Gait Balance-Level of Assistance  Minimum assistance;x2  -       Gait Balance Support  assistive device  -       Orthotics Prosthetics    Additional Documentation  Orthosis Location (Group);Orthosis Management/Training (Group)  -       Orthosis Location    Orthosis Location/Type  neck/back  -       Orthosis, Neck/Back  LSO (lumbar sacral  orthosis)  -       Orthosis Management/Training    Orthosis Fabrication Detail  pt already owns LSO but does not have it with her for eval, family plans to bring it this morning  -       Orthosis Skills Training  activity limitations;restrictions/precautions  -       Sensory Assessment/Intervention    Sensory Impairment  numbness   c/o numbness in L calf once up EOB  -       General Therapy Interventions    Planned Therapy Interventions  activity intolerance;adaptive equipment training;ADL retraining;balance training;bed mobility training;orthotic fitting/training;transfer training  -       Positioning and Restraints    Pre-Treatment Position  in bed  -       Post Treatment Position  chair  -       In Chair  sitting;call light within reach;encouraged to call for assist;with family/caregiver  -       General LE Assessment    ROM Detail (P)  L hip flexion impaired 75%  -MS        Lower Extremity    Lower Ext Manual Muscle Testing Detail (P)  L hip flexion 2/5  -MS          User Key  (r) = Recorded By, (t) = Taken By, (c) = Cosigned By    Initials Name Effective Dates     Sebastián Howe, OTR/L 06/22/15 -     MS Nicole Olson, PT DPT 08/02/16 -     AB Ximena Resendez, RN 08/02/16 -            Occupational Therapy Education     Title: PT OT SLP Therapies (Done)     Topic: Occupational Therapy (Done)     Point: ADL training (Done)    Description: Instruct learner(s) on proper safety adaptation and remediation techniques during self care or transfers.   Instruct in proper use of assistive devices.    Learning Progress Summary    Learner Readiness Method Response Comment Documented by Status   Patient Acceptance E VU OT POC, safe transfers, dressing weaker LE first  01/18/18 0842 Done               Point: Precautions (Done)    Description: Instruct learner(s) on prescribed precautions during self-care and functional transfers.    Learning Progress Summary    Learner Readiness Method Response Comment  Documented by Status   Patient Acceptance E VU OT POC, safe transfers, dressing weaker LE first AC 01/18/18 0842 Done               Point: Body mechanics (Done)    Description: Instruct learner(s) on proper positioning and spine alignment during self-care, functional mobility activities and/or exercises.    Learning Progress Summary    Learner Readiness Method Response Comment Documented by Status   Patient Acceptance E VU OT POC, safe transfers, dressing weaker LE first AC 01/18/18 0842 Done                      User Key     Initials Effective Dates Name Provider Type Discipline     06/22/15 -  Sebastián Howe, OTR/L Occupational Therapist OT                  OT Recommendation and Plan  Anticipated Discharge Disposition: home with assist  Planned Therapy Interventions: activity intolerance, adaptive equipment training, ADL retraining, balance training, bed mobility training, orthotic fitting/training, transfer training  Therapy Frequency: 3-5 times/wk  Plan of Care Review  Plan Of Care Reviewed With: patient  Progress: no change  Outcome Summary/Follow up Plan: OT eval completed.  Pt rolls L with CGA and verbal cues with use of bed rail, able to come into sitting with CGA.  ModA needed for LB dressing, CGA for hygiene after toileting using BSC.  Pt transfers with minAx2 with severe buckling of LLE while WBing and ambulating.  Pt required use of rw to support herself and decrease risk of falls.  She transferred to BSC and then to chair with minAx2.  Pt does better if she's given the opportunity to transfer toward her R.  Pt requires skilled OT to address decreased ADL function, imbalance, weakness, decreased safety and knowledge deficit related to spine surgery.  She already owns LSO however she did not currently have it available during eval.  Plans to bring it later today.  2nd stage sx also planned for 1/19/18.  Anticipate discharge home with 24/7 supervision.  Pt would benefit from shower chair and possible AE  at discharge.           OT Goals       01/18/18 0843          Transfer Training OT LTG    Transfer Training OT LTG, Date Established 01/18/18  -AC      Transfer Training OT LTG, Time to Achieve by discharge  -AC      Transfer Training OT LTG, Activity Type shower chair;walk-in shower;toilet  -AC      Transfer Training OT LTG, [place] Level contact guard assist  -AC      Transfer Training OT LTG, Assist Device walker, rolling;commode, bedside;shower chair  -AC      Bathing OT LTG    Bathing Goal OT LTG, Date Established 01/18/18  -AC      Bathing Goal OT LTG, Time to Achieve by discharge  -AC      Bathing Goal OT LTG, Activity Type lower body bathing  -AC      Bathing Goal OT LTG, [place] Level supervision required  -AC      Bathing Goal OT LTG, Assist Device shower chair  -AC      LB Dressing OT LTG    LB Dressing Goal OT LTG, Date Established 01/18/18  -AC      LB Dressing Goal OT LTG, Time to Achieve by discharge  -AC      LB Dressing Goal OT LTG, [place] Level contact guard assist  -AC      LB Dressing Goal OT LTG, Additional Goal AE PRN  -AC        User Key  (r) = Recorded By, (t) = Taken By, (c) = Cosigned By    Initials Name Provider Type    TABITHA Howe, OTR/L Occupational Therapist                Outcome Measures       01/18/18 0841          How much help from another is currently needed...    Putting on and taking off regular lower body clothing? 2  -AC      Bathing (including washing, rinsing, and drying) 2  -AC      Toileting (which includes using toilet bed pan or urinal) 3  -AC      Putting on and taking off regular upper body clothing 4  -AC      Taking care of personal grooming (such as brushing teeth) 3  -AC      Eating meals 4  -AC      Score 18  -AC      Functional Assessment    Outcome Measure Options AM-PAC 6 Clicks Daily Activity (OT)  -AC        User Key  (r) = Recorded By, (t) = Taken By, (c) = Cosigned By    Initials Name Provider Type    TABITHA Howe, OTR/L  Occupational Therapist          Time Calculation:   OT Start Time: 0738  OT Stop Time: 0835  OT Time Calculation (min): 57 min    Therapy Charges for Today     Code Description Service Date Service Provider Modifiers Qty    [id number]  OT SELFCARE CURRENT 1/18/2018 Sebastián Howe, OTR/L GO, CK 1    [id number]  OT SELFCARE PROJECTED 1/18/2018 Sebastián Howe OTR/L GO, CJ 1    [id number]  OT EVAL MOD COMPLEXITY 4 1/18/2018 Sebastián Howe OTR/L GO, KX 1          OT G-codes  OT Professional Judgement Used?: Yes  OT Functional Scales Options: AM-PAC 6 Clicks Daily Activity (OT)  Score: 18  Functional Limitation: Self care  Self Care Current Status (): At least 40 percent but less than 60 percent impaired, limited or restricted  Self Care Goal Status (): At least 20 percent but less than 40 percent impaired, limited or restricted    Sebastián Howe OTR/L  1/18/2018

## 2018-01-19 ENCOUNTER — APPOINTMENT (OUTPATIENT)
Dept: GENERAL RADIOLOGY | Facility: HOSPITAL | Age: 63
End: 2018-01-19

## 2018-01-19 ENCOUNTER — ANESTHESIA (OUTPATIENT)
Dept: PERIOP | Facility: HOSPITAL | Age: 63
End: 2018-01-19

## 2018-01-19 ENCOUNTER — DOCUMENTATION (OUTPATIENT)
Dept: SOCIAL WORK | Facility: HOSPITAL | Age: 63
End: 2018-01-19

## 2018-01-19 LAB — GLUCOSE BLDC GLUCOMTR-MCNC: 104 MG/DL (ref 70–130)

## 2018-01-19 PROCEDURE — C1713 ANCHOR/SCREW BN/BN,TIS/BN: HCPCS

## 2018-01-19 PROCEDURE — 25010000002 SUCCINYLCHOLINE PER 20 MG: Performed by: NURSE ANESTHETIST, CERTIFIED REGISTERED

## 2018-01-19 PROCEDURE — 25010000002 ONDANSETRON PER 1 MG: Performed by: ANESTHESIOLOGY

## 2018-01-19 PROCEDURE — 25010000003 CEFAZOLIN PER 500 MG: Performed by: NURSE ANESTHETIST, CERTIFIED REGISTERED

## 2018-01-19 PROCEDURE — 0SB20ZZ EXCISION OF LUMBAR VERTEBRAL DISC, OPEN APPROACH: ICD-10-PCS

## 2018-01-19 PROCEDURE — 82962 GLUCOSE BLOOD TEST: CPT

## 2018-01-19 PROCEDURE — 25010000003 CEFAZOLIN PER 500 MG

## 2018-01-19 PROCEDURE — 25010000002 KETOROLAC TROMETHAMINE PER 15 MG

## 2018-01-19 PROCEDURE — 25010000002 HYDROMORPHONE PER 4 MG

## 2018-01-19 PROCEDURE — 4A11X4G MONITORING OF PERIPHERAL NERVOUS ELECTRICAL ACTIVITY, INTRAOPERATIVE, EXTERNAL APPROACH: ICD-10-PCS

## 2018-01-19 PROCEDURE — 76000 FLUOROSCOPY <1 HR PHYS/QHP: CPT

## 2018-01-19 PROCEDURE — 72100 X-RAY EXAM L-S SPINE 2/3 VWS: CPT

## 2018-01-19 PROCEDURE — 25010000002 PROPOFOL 10 MG/ML EMULSION: Performed by: NURSE ANESTHETIST, CERTIFIED REGISTERED

## 2018-01-19 PROCEDURE — 4B02XSZ MEASUREMENT OF CARDIAC PACEMAKER, EXTERNAL APPROACH: ICD-10-PCS

## 2018-01-19 PROCEDURE — 25010000002 HYDROMORPHONE PER 4 MG: Performed by: ANESTHESIOLOGY

## 2018-01-19 PROCEDURE — 0SG1071 FUSION OF 2 OR MORE LUMBAR VERTEBRAL JOINTS WITH AUTOLOGOUS TISSUE SUBSTITUTE, POSTERIOR APPROACH, POSTERIOR COLUMN, OPEN APPROACH: ICD-10-PCS

## 2018-01-19 PROCEDURE — 25010000002 MIDAZOLAM PER 1 MG: Performed by: ANESTHESIOLOGY

## 2018-01-19 DEVICE — SCRW ST EVEREST PA TI: Type: IMPLANTABLE DEVICE | Status: FUNCTIONAL

## 2018-01-19 DEVICE — IMPLANTABLE DEVICE: Type: IMPLANTABLE DEVICE | Status: FUNCTIONAL

## 2018-01-19 RX ORDER — LABETALOL HYDROCHLORIDE 5 MG/ML
5 INJECTION, SOLUTION INTRAVENOUS
Status: DISCONTINUED | OUTPATIENT
Start: 2018-01-19 | End: 2018-01-19 | Stop reason: HOSPADM

## 2018-01-19 RX ORDER — MAGNESIUM HYDROXIDE 1200 MG/15ML
LIQUID ORAL AS NEEDED
Status: DISCONTINUED | OUTPATIENT
Start: 2018-01-19 | End: 2018-01-19 | Stop reason: HOSPADM

## 2018-01-19 RX ORDER — HYDRALAZINE HYDROCHLORIDE 20 MG/ML
5 INJECTION INTRAMUSCULAR; INTRAVENOUS
Status: DISCONTINUED | OUTPATIENT
Start: 2018-01-19 | End: 2018-01-19 | Stop reason: HOSPADM

## 2018-01-19 RX ORDER — SODIUM CHLORIDE 0.9 % (FLUSH) 0.9 %
1-10 SYRINGE (ML) INJECTION AS NEEDED
Status: DISCONTINUED | OUTPATIENT
Start: 2018-01-19 | End: 2018-01-19 | Stop reason: HOSPADM

## 2018-01-19 RX ORDER — MEPERIDINE HYDROCHLORIDE 25 MG/ML
12.5 INJECTION INTRAMUSCULAR; INTRAVENOUS; SUBCUTANEOUS
Status: DISCONTINUED | OUTPATIENT
Start: 2018-01-19 | End: 2018-01-19 | Stop reason: HOSPADM

## 2018-01-19 RX ORDER — SUFENTANIL CITRATE 50 UG/ML
INJECTION EPIDURAL; INTRAVENOUS AS NEEDED
Status: DISCONTINUED | OUTPATIENT
Start: 2018-01-19 | End: 2018-01-19 | Stop reason: SURG

## 2018-01-19 RX ORDER — FLUMAZENIL 0.1 MG/ML
0.2 INJECTION INTRAVENOUS AS NEEDED
Status: DISCONTINUED | OUTPATIENT
Start: 2018-01-19 | End: 2018-01-19 | Stop reason: HOSPADM

## 2018-01-19 RX ORDER — KETOROLAC TROMETHAMINE 30 MG/ML
30 INJECTION, SOLUTION INTRAMUSCULAR; INTRAVENOUS ONCE
Status: COMPLETED | OUTPATIENT
Start: 2018-01-19 | End: 2018-01-19

## 2018-01-19 RX ORDER — METOCLOPRAMIDE HYDROCHLORIDE 5 MG/ML
5 INJECTION INTRAMUSCULAR; INTRAVENOUS
Status: DISCONTINUED | OUTPATIENT
Start: 2018-01-19 | End: 2018-01-19 | Stop reason: HOSPADM

## 2018-01-19 RX ORDER — SODIUM CHLORIDE, SODIUM LACTATE, POTASSIUM CHLORIDE, CALCIUM CHLORIDE 600; 310; 30; 20 MG/100ML; MG/100ML; MG/100ML; MG/100ML
30 INJECTION, SOLUTION INTRAVENOUS CONTINUOUS
Status: DISCONTINUED | OUTPATIENT
Start: 2018-01-19 | End: 2018-01-19

## 2018-01-19 RX ORDER — MORPHINE SULFATE 2 MG/ML
2 INJECTION, SOLUTION INTRAMUSCULAR; INTRAVENOUS AS NEEDED
Status: DISCONTINUED | OUTPATIENT
Start: 2018-01-19 | End: 2018-01-19 | Stop reason: HOSPADM

## 2018-01-19 RX ORDER — NALOXONE HCL 0.4 MG/ML
0.04 VIAL (ML) INJECTION AS NEEDED
Status: DISCONTINUED | OUTPATIENT
Start: 2018-01-19 | End: 2018-01-19 | Stop reason: HOSPADM

## 2018-01-19 RX ORDER — MIDAZOLAM HYDROCHLORIDE 1 MG/ML
2 INJECTION INTRAMUSCULAR; INTRAVENOUS
Status: DISCONTINUED | OUTPATIENT
Start: 2018-01-19 | End: 2018-01-19 | Stop reason: HOSPADM

## 2018-01-19 RX ORDER — SODIUM CHLORIDE 9 MG/ML
INJECTION, SOLUTION INTRAVENOUS AS NEEDED
Status: DISCONTINUED | OUTPATIENT
Start: 2018-01-19 | End: 2018-01-19 | Stop reason: HOSPADM

## 2018-01-19 RX ORDER — SODIUM CHLORIDE, SODIUM LACTATE, POTASSIUM CHLORIDE, CALCIUM CHLORIDE 600; 310; 30; 20 MG/100ML; MG/100ML; MG/100ML; MG/100ML
100 INJECTION, SOLUTION INTRAVENOUS CONTINUOUS
Status: DISCONTINUED | OUTPATIENT
Start: 2018-01-19 | End: 2018-01-19

## 2018-01-19 RX ORDER — PROPOFOL 10 MG/ML
VIAL (ML) INTRAVENOUS AS NEEDED
Status: DISCONTINUED | OUTPATIENT
Start: 2018-01-19 | End: 2018-01-19 | Stop reason: SURG

## 2018-01-19 RX ORDER — ONDANSETRON 2 MG/ML
4 INJECTION INTRAMUSCULAR; INTRAVENOUS AS NEEDED
Status: DISCONTINUED | OUTPATIENT
Start: 2018-01-19 | End: 2018-01-19 | Stop reason: HOSPADM

## 2018-01-19 RX ORDER — IPRATROPIUM BROMIDE AND ALBUTEROL SULFATE 2.5; .5 MG/3ML; MG/3ML
3 SOLUTION RESPIRATORY (INHALATION) ONCE AS NEEDED
Status: DISCONTINUED | OUTPATIENT
Start: 2018-01-19 | End: 2018-01-19 | Stop reason: HOSPADM

## 2018-01-19 RX ORDER — SUCCINYLCHOLINE CHLORIDE 20 MG/ML
INJECTION INTRAMUSCULAR; INTRAVENOUS AS NEEDED
Status: DISCONTINUED | OUTPATIENT
Start: 2018-01-19 | End: 2018-01-19 | Stop reason: SURG

## 2018-01-19 RX ORDER — CEFAZOLIN SODIUM 1 G/3ML
INJECTION, POWDER, FOR SOLUTION INTRAMUSCULAR; INTRAVENOUS AS NEEDED
Status: DISCONTINUED | OUTPATIENT
Start: 2018-01-19 | End: 2018-01-19 | Stop reason: SURG

## 2018-01-19 RX ORDER — MIDAZOLAM HYDROCHLORIDE 1 MG/ML
1 INJECTION INTRAMUSCULAR; INTRAVENOUS
Status: DISCONTINUED | OUTPATIENT
Start: 2018-01-19 | End: 2018-01-19 | Stop reason: HOSPADM

## 2018-01-19 RX ORDER — LIDOCAINE HYDROCHLORIDE 20 MG/ML
INJECTION, SOLUTION INFILTRATION; PERINEURAL AS NEEDED
Status: DISCONTINUED | OUTPATIENT
Start: 2018-01-19 | End: 2018-01-19 | Stop reason: SURG

## 2018-01-19 RX ADMIN — HYDROMORPHONE HYDROCHLORIDE 0.5 MG: 1 INJECTION, SOLUTION INTRAMUSCULAR; INTRAVENOUS; SUBCUTANEOUS at 14:17

## 2018-01-19 RX ADMIN — KETOROLAC TROMETHAMINE 30 MG: 30 INJECTION, SOLUTION INTRAMUSCULAR at 14:36

## 2018-01-19 RX ADMIN — MEPERIDINE HYDROCHLORIDE 12.5 MG: 25 INJECTION INTRAMUSCULAR; INTRAVENOUS; SUBCUTANEOUS at 14:20

## 2018-01-19 RX ADMIN — HYDROMORPHONE HYDROCHLORIDE 0.5 MG: 1 INJECTION, SOLUTION INTRAMUSCULAR; INTRAVENOUS; SUBCUTANEOUS at 14:02

## 2018-01-19 RX ADMIN — SODIUM CHLORIDE, POTASSIUM CHLORIDE, SODIUM LACTATE AND CALCIUM CHLORIDE 30 ML/HR: 600; 310; 30; 20 INJECTION, SOLUTION INTRAVENOUS at 09:16

## 2018-01-19 RX ADMIN — HYDROMORPHONE HYDROCHLORIDE 1 MG: 1 INJECTION, SOLUTION INTRAMUSCULAR; INTRAVENOUS; SUBCUTANEOUS at 16:41

## 2018-01-19 RX ADMIN — HYDROMORPHONE HYDROCHLORIDE 0.5 MG: 1 INJECTION, SOLUTION INTRAMUSCULAR; INTRAVENOUS; SUBCUTANEOUS at 14:07

## 2018-01-19 RX ADMIN — LIDOCAINE HYDROCHLORIDE 100 MG: 20 INJECTION, SOLUTION INFILTRATION; PERINEURAL at 11:06

## 2018-01-19 RX ADMIN — SODIUM CHLORIDE, POTASSIUM CHLORIDE, SODIUM LACTATE AND CALCIUM CHLORIDE 100 ML/HR: 600; 310; 30; 20 INJECTION, SOLUTION INTRAVENOUS at 09:16

## 2018-01-19 RX ADMIN — SUCCINYLCHOLINE CHLORIDE 120 MG: 20 INJECTION, SOLUTION INTRAMUSCULAR; INTRAVENOUS at 11:06

## 2018-01-19 RX ADMIN — CYCLOBENZAPRINE HYDROCHLORIDE 10 MG: 10 TABLET, FILM COATED ORAL at 15:51

## 2018-01-19 RX ADMIN — SUFENTANIL CITRATE 40 MCG: 50 INJECTION, SOLUTION EPIDURAL; INTRAVENOUS at 11:06

## 2018-01-19 RX ADMIN — OXYCODONE HYDROCHLORIDE AND ACETAMINOPHEN 2 TABLET: 7.5; 325 TABLET ORAL at 20:48

## 2018-01-19 RX ADMIN — ISOSORBIDE MONONITRATE 30 MG: 30 TABLET, EXTENDED RELEASE ORAL at 08:21

## 2018-01-19 RX ADMIN — CEFAZOLIN 2 G: 1 INJECTION, POWDER, FOR SOLUTION INTRAVENOUS at 11:01

## 2018-01-19 RX ADMIN — LIDOCAINE HYDROCHLORIDE 0.5 ML: 10 INJECTION, SOLUTION EPIDURAL; INFILTRATION; INTRACAUDAL; PERINEURAL at 09:16

## 2018-01-19 RX ADMIN — CARVEDILOL 12.5 MG: 6.25 TABLET, FILM COATED ORAL at 18:06

## 2018-01-19 RX ADMIN — PROPOFOL 150 MG: 10 INJECTION, EMULSION INTRAVENOUS at 11:06

## 2018-01-19 RX ADMIN — SODIUM CHLORIDE 50 ML/HR: 9 INJECTION, SOLUTION INTRAVENOUS at 05:10

## 2018-01-19 RX ADMIN — PREGABALIN 50 MG: 50 CAPSULE ORAL at 15:51

## 2018-01-19 RX ADMIN — HYDROMORPHONE HYDROCHLORIDE 0.5 MG: 1 INJECTION, SOLUTION INTRAMUSCULAR; INTRAVENOUS; SUBCUTANEOUS at 14:12

## 2018-01-19 RX ADMIN — CEFAZOLIN 1 G: 1 INJECTION, POWDER, FOR SOLUTION INTRAMUSCULAR; INTRAVENOUS at 18:07

## 2018-01-19 RX ADMIN — ONDANSETRON HYDROCHLORIDE 4 MG: 2 SOLUTION INTRAMUSCULAR; INTRAVENOUS at 13:50

## 2018-01-19 RX ADMIN — MEPERIDINE HYDROCHLORIDE 12.5 MG: 25 INJECTION INTRAMUSCULAR; INTRAVENOUS; SUBCUTANEOUS at 14:25

## 2018-01-19 RX ADMIN — OXYCODONE HYDROCHLORIDE AND ACETAMINOPHEN 2 TABLET: 7.5; 325 TABLET ORAL at 15:51

## 2018-01-19 RX ADMIN — CLONIDINE HYDROCHLORIDE 0.2 MG: 0.2 TABLET ORAL at 20:48

## 2018-01-19 RX ADMIN — SUFENTANIL CITRATE 5 MCG: 50 INJECTION, SOLUTION EPIDURAL; INTRAVENOUS at 11:30

## 2018-01-19 RX ADMIN — CARVEDILOL 12.5 MG: 6.25 TABLET, FILM COATED ORAL at 08:21

## 2018-01-19 RX ADMIN — SUFENTANIL CITRATE 5 MCG: 50 INJECTION, SOLUTION EPIDURAL; INTRAVENOUS at 11:20

## 2018-01-19 RX ADMIN — MIDAZOLAM HYDROCHLORIDE 2 MG: 1 INJECTION, SOLUTION INTRAMUSCULAR; INTRAVENOUS at 09:17

## 2018-01-19 NOTE — ANESTHESIA PROCEDURE NOTES
Airway  Urgency: elective    Airway not difficult    General Information and Staff    Patient location during procedure: OR  CRNA: FLIP FLOR    Indications and Patient Condition  Indications for airway management: airway protection    Preoxygenated: yes  Mask difficulty assessment: 1 - vent by mask    Final Airway Details  Final airway type: endotracheal airway      Successful airway: ETT  Cuffed: yes   Successful intubation technique: direct laryngoscopy  Facilitating devices/methods: intubating stylet  Endotracheal tube insertion site: oral  Blade: Nikko  Blade size: #3  ETT size: 7.0 mm  Cormack-Lehane Classification: grade III - view of epiglottis only  Placement verified by: chest auscultation and capnometry   Cuff volume (mL): 8  Measured from: lips  ETT to lips (cm): 21  Number of attempts at approach: 1    Additional Comments  Atraumatic intubation

## 2018-01-19 NOTE — PLAN OF CARE
Problem: Patient Care Overview (Adult)  Goal: Plan of Care Review  Outcome: Ongoing (interventions implemented as appropriate)   01/19/18 1447   Coping/Psychosocial Response Interventions   Plan Of Care Reviewed With patient   Patient Care Overview   Progress improving   Outcome Evaluation   Outcome Summary/Follow up Plan patient c/o pain but states it is easing up, pacu discharge critera met       Problem: Perioperative Period (Adult)  Goal: Signs and Symptoms of Listed Potential Problems Will be Absent or Manageable (Perioperative Period)  Outcome: Ongoing (interventions implemented as appropriate)

## 2018-01-19 NOTE — PROGRESS NOTES
Pt has minimal pain.  C/O weakness in Lt quad.  Also, numbness in anterior thigh    PE: Inc: CDI  Neuro:  +numbness in anterior left thigh. 3/5 quad function, 5/5 TA and EHL function.      A/P:  s/p LLIF L34 & L45 for spondylolisthesis and severe stenosis.  She has somewhat of a femoral nerve palsy, but she still has motor and sensory function in the LLE, albeit weaker than expected.  This could be a double crush type injury with the surgery being the second injury.  Also, pt's stenosis was very severe and she has a large extruded fragment in the disc space.  Plan today will be to proceed with decompression and PSF as per the pre op plan.  Knee immobilizer will help while the quad function is diminished to keep from the left knee buckling.

## 2018-01-19 NOTE — PLAN OF CARE
Problem: Patient Care Overview (Adult)  Goal: Plan of Care Review  Outcome: Ongoing (interventions implemented as appropriate)   01/19/18 0306   Coping/Psychosocial Response Interventions   Plan Of Care Reviewed With patient   Patient Care Overview   Progress no change   Outcome Evaluation   Outcome Summary/Follow up Plan pt's pain has been very well controled, pt is still have issues being able to mobilize her left leg, and states that there is intermittent numbness too, VSS, family at bedside, NPO for 2nd pt of her back surgery.        Problem: Laminectomy/Foraminotomy/Discectomy (Adult)  Goal: Signs and Symptoms of Listed Potential Problems Will be Absent or Manageable (Laminectomy/Foraminotomy/Discectomy)  Outcome: Ongoing (interventions implemented as appropriate)      Problem: Fall Risk (Adult)  Goal: Absence of Falls  Outcome: Ongoing (interventions implemented as appropriate)

## 2018-01-19 NOTE — ANESTHESIA POSTPROCEDURE EVALUATION
Patient: Tawny Shin    Procedure Summary     Date Anesthesia Start Anesthesia Stop Room / Location    01/17/18 1410 1615 [facility] 05 / [facility]       Procedure Diagnosis Surgeon Provider    LEFT L3-4 L4-5 LATERAL LUMBAR INTERBODY FUSION (Left Spine Lumbar) (LUMBAR STENOSIS) MD Andres Dennison, TAMMIE          Anesthesia Type: general  Last vitals  BP   133/59 (01/19/18 0422)   Temp   98.6 °F (37 °C) (01/19/18 0422)   Pulse   56 (01/19/18 0422)   Resp   18 (01/19/18 0422)     SpO2   93 % (01/19/18 0422)     Post Anesthesia Care and Evaluation    Patient location during evaluation: PACU  Patient participation: complete - patient participated  Level of consciousness: awake and alert  Pain score: 3  Pain management: adequate  Airway patency: patent  Anesthetic complications: No anesthetic complications  PONV Status: none  Cardiovascular status: acceptable and stable  Respiratory status: acceptable and unassisted  Hydration status: acceptable

## 2018-01-19 NOTE — ANESTHESIA PREPROCEDURE EVALUATION
Anesthesia Evaluation     Patient summary reviewed   no history of anesthetic complications:  NPO Solid Status: > 8 hours  NPO Liquid Status: > 8 hours     Airway   Mallampati: II  TM distance: >3 FB  Neck ROM: full  no difficulty expected  Dental          Pulmonary - normal exam    breath sounds clear to auscultation  (+) asthma,   (-) COPD, sleep apnea, not a smoker  Cardiovascular - normal exam  Exercise tolerance: good (4-7 METS)    ECG reviewed  PT is on anticoagulation therapy  Patient on routine beta blocker and Beta blocker given within 24 hours of surgery  Rhythm: regular  Rate: normal    (+) pacemaker (C-nario, interrogated 8/2/17) pacemaker, hypertension, past MI (2013) , cardiac stents (2012, 2013)   (-) angina    ROS comment: Off brilinta x1 week    DAMON STRESS TEST REPORT PROCEDURE DATE: 8/15/2017  COMMENT:  1. Resting EKG reveals sinus rhythm  2. During Lexiscan there was no chest pain or significant ST segment changes diagnostic of myocardial ischemia.    Neuro/Psych  (-) seizures, TIA, CVA  GI/Hepatic/Renal/Endo    (+)  hypothyroidism,   (-) liver disease, no renal disease, diabetes, hyperthyroidism    Musculoskeletal     Abdominal    Substance History      OB/GYN          Other        ROS/Med Hx Other: Sjogrens disease                                          Anesthesia Plan    ASA 3     general     intravenous induction   Anesthetic plan and risks discussed with patient.

## 2018-01-19 NOTE — BRIEF OP NOTE
LUMBAR FUSION DECOMPRESSON WITH PEDICLE SCREWS  Progress Note    Tawny Shin  1/17/2018 - 1/19/2018    Pre-op Diagnosis:   LUMBAR STENOSIS        Post-Op Diagnosis Codes:       Procedure/CPT® Codes:      Procedure(s):  L3-4,L4-5 DECOMPRESSION, POSTERIOR SPINAL FUSION WITH INSTRUMENTATION    Surgeon(s):  Fortino Oropeza MD    Anesthesia: General    Staff:   Circulator: Sebastián Warner RN; Nino Daveis RN  Scrub Person: Yumiko Christianson; Vlad Blanca  Vendor Representative: Aron Walter    Estimated Blood Loss: 200ml    Urine Voided: * No values recorded between 1/19/2018 11:01 AM and 1/19/2018  1:48 PM *    Specimens:                None      Drains:           Findings: SEE OPERATIVE REPORT    Complications: NONE      VIRGIL Rodriguez     Date: 1/19/2018  Time: 1:49 PM

## 2018-01-19 NOTE — OP NOTE
PREOPERATIVE DIAGNOSES  1. Lumbar stenosis, L34 & L45.  2. Lumbar degenerative disk disease  3. Lumbar Radiculopathy  4. Lower back pain  5. L45 disc herniation with extruded fragment   6. Spondylolisthesis L34 & L45  7. Sagittal plane imbalance secondary to loss of anterior column height  8.  S/p LLIF L34 & L45      POSTOPERATIVE DIAGNOSES  Same      PROCEDURE:  1.  L45 laminectomy partial facetectomy with decompression of the neural elements  2.  Removal of large extruded disc fragment central canal L4 5  3.  Posterior spinal fusion L3 4 and L4 5  4.  Screw instrumentation L3 4 and L4 5 using 6.5 x 50 mm K2M [place] XT screws  5.  Use of locally obtained autograft bone for posterior spinal fusion  6.  Use of intraoperative monitoring with direct pedicle screw testing  7.  Use of intraoperative fluoroscopy           ASSISTANT  LANETTE Moraes assisted throughout all key components of this case by retracting soft  tissues and helping to enable adequate visualization to safely complete all  aspects of this procedure. He was scrubbed throughout the entirety of the  procedure.      ANESTHESIA  General endotracheal anesthesia.      ESTIMATED BLOOD LOSS:   100 mL.      INTRAVENOUS FLUIDS  Crystalloid.     COMPLICATIONS  None.      IMPLANTS  See Brief op note.    Procedure in detail:  The patient was seen in the preoperative holding area and the operative site was identified.  The procedure was discussed with her and physical exam was performed.  She had postoperative left sided quadriceps weakness after her lateral lumbar interbody fusion that was 3 out of 5.  She had known stenosis at L4 5 with a large extruded fragment of disc into the spinal canal.  The plan was to perform a laminectomy and removed the large extruded fragment of disc as well as performed posterior lateral fusion from L3 to L5.    Assessment was taken to the operating room where she was placed under general endotracheal anesthesia.  She  was then positioned prone position on the OSI axis Omari table.  Appropriate monitoring leads were placed prior to turning the patient prone.  Once she was in position all bony prominences were padded.  Lumbar spine was prepped and draped in sterile fashion.  C-arm was used to localize site of the incisions and the skin was marked.  A timeout was called in on the room agreed proceed.    Initially a midline incision of about an inch and a half was made over the L4 5 level.  Dissection was carried down through the skin and subcutaneous fat to the dorsal lumbar fascia.  The fascia was opened in the midline and the paraspinal musculature was elevated subperiosteally from the spinous processes and hemilamina of L4 and L5.  The C-arm was then used to confirm the appropriate level was exposed once the Jimenez retractor was placed.  The surgical microscope was brought in to be utilized throughout the laminectomy decompression portion of the case.  The spinous process and central lamina of L4 was removed with the Leksell rongeur.  A 3 mm Kerrison was then used to extend the laminectomy proximally and then remove all ligamentum flavum dorsally.  The dura had a very odd appearance with of scarred layer over the top of it that was adherent basically to the entire dura.  I was able to decompress all the way out to the pedicles bilaterally exposing the lateral aspect of the thecal sac as well as the shoulder of the L5 nerve roots bilaterally.  On the right side I was able to take a Keene dental tool and tease all of the extruded disc material out from the central portion of the canal into the visualized lateral recess and remove it with suction and pituitaries.  Once this was completed the nerve roots were traced all the way out each neuroforamen along the medial wall the pedicle tracing out the L5 nerve roots and then out the neural foramen of L4 5 and there was no residual compression noted on any of the neural  structures.  The bone obtained from the laminectomy/facetectomy was passed off to the back table to be cleaned and morselized and used as bone graft.  The facet joints were decorticated and the bone was packed within the facet joints bilaterally at L4 5 and L3 4.    At this point, separate Scott incisions were made over the pedicles of L3 4 and 5 bilaterally.  Jamshidi needles were used to cannulate the pedicles of L3 4 and 5 bilaterally.  C-arm was utilized to ensure that the Jamshidi needles were safely positioned at each level.  Each time the Jamshidi needle was passed by the junction of the pedicle and vertebral body Jamshidi needle was stimulated.  Safe readings were found at each level.  Guidewires were then placed through the central portion of each Jamshidi needle into the cancellus bone of the vertebral bodies.  Dilators were then used to dilate the path of the pedicle screws over each guidewire.  The guidewires were then used to place 6.5 x 50 mm pedicle screws over top into the pedicles and vertebral bodies of L3 4 and 5 without complication.   the guidewires were then removed.  Propria length rods were then selected and passed in a antegrade fashion through the insertion towers into the saddles of the pedicle screws.  The locking caps were used to fully seat the rods into the pedicle screw saddles.  A reduction maneuver was performed at both L3 4 and L4 5 pulling the L4 vertebral body back on L5 and the L3 vertebral body back on L4.  Near complete reduction of the spondylolisthesis was achieved at each level.  The locking caps were torqued with the final torque limiting torque wrench.  Final C-arm images made showing satisfactory position of the implants.  The insertion towers were then removed.  The wounds were all irrigated and suctioned.  The wounds were then closed with #1 Vicryl reapproximating the fascia and 2-0 Vicryl reapproximating the subcutaneous skin skin edges reapproximated with a running  subcutaneous Monocryl.  Mastisol Steri-Strips and sterile dressing were applied.    Patient was then awakened from general endotracheal anesthesia extubated and taken recovery room in satisfactory condition there were no complications correct at the end of the case

## 2018-01-19 NOTE — ANESTHESIA POSTPROCEDURE EVALUATION
"Patient: Tawny Shin    Procedure Summary     Date Anesthesia Start Anesthesia Stop Room / Location    01/19/18 1101 1358 [facility]       Procedure Diagnosis Surgeon Provider    L3-4,L4-5 DECOMPRESSION, POSTERIOR SPINAL FUSION WITH INSTRUMENTATION (N/A Spine Lumbar) (LUMBAR STENOSIS ) MD Dillon Dennison CRNA          Anesthesia Type: general  Last vitals  BP   146/66 (01/19/18 1541)   Temp   97.3 °F (36.3 °C) (01/19/18 1541)   Pulse   60 (01/19/18 1541)   Resp   16 (01/19/18 1541)     SpO2   99 % (01/19/18 1541)     Post Anesthesia Care and Evaluation    Patient location during evaluation: PACU  Patient participation: complete - patient participated  Level of consciousness: awake and alert  Pain management: adequate  Airway patency: patent  Anesthetic complications: No anesthetic complications  PONV Status: none  Cardiovascular status: acceptable and hemodynamically stable  Respiratory status: acceptable  Hydration status: acceptable    Comments: Blood pressure 146/66, pulse 60, temperature 97.3 °F (36.3 °C), temperature source Oral, resp. rate 16, height 165 cm (64.96\"), weight 93.8 kg (206 lb 12.7 oz), SpO2 99 %.    Patient discharged from PACU based upon Abdifatah score. Please see RN notes for further details      "

## 2018-01-19 NOTE — PLAN OF CARE
Problem: Perioperative Period (Adult)  Goal: Signs and Symptoms of Listed Potential Problems Will be Absent or Manageable (Perioperative Period)  Outcome: Ongoing (interventions implemented as appropriate)   01/19/18 0970   Perioperative Period   Problems Assessed (Perioperative Period) pain;embolism;infection   Problems Present (Perioperative Period) none

## 2018-01-19 NOTE — PLAN OF CARE
Problem: Patient Care Overview (Adult)  Goal: Plan of Care Review  Outcome: Ongoing (interventions implemented as appropriate)   01/19/18 0911   Coping/Psychosocial Response Interventions   Plan Of Care Reviewed With patient   Patient Care Overview   Progress no change

## 2018-01-20 PROCEDURE — 25010000003 CEFAZOLIN PER 500 MG

## 2018-01-20 PROCEDURE — 97116 GAIT TRAINING THERAPY: CPT | Performed by: PHYSICAL THERAPIST

## 2018-01-20 PROCEDURE — G8987 SELF CARE CURRENT STATUS: HCPCS

## 2018-01-20 PROCEDURE — 63710000001 PREDNISONE PER 5 MG

## 2018-01-20 PROCEDURE — 97116 GAIT TRAINING THERAPY: CPT

## 2018-01-20 PROCEDURE — 97168 OT RE-EVAL EST PLAN CARE: CPT

## 2018-01-20 PROCEDURE — G8988 SELF CARE GOAL STATUS: HCPCS

## 2018-01-20 PROCEDURE — 97535 SELF CARE MNGMENT TRAINING: CPT

## 2018-01-20 PROCEDURE — 97164 PT RE-EVAL EST PLAN CARE: CPT | Performed by: PHYSICAL THERAPIST

## 2018-01-20 PROCEDURE — 97530 THERAPEUTIC ACTIVITIES: CPT | Performed by: PHYSICAL THERAPIST

## 2018-01-20 PROCEDURE — G8978 MOBILITY CURRENT STATUS: HCPCS | Performed by: PHYSICAL THERAPIST

## 2018-01-20 PROCEDURE — G8979 MOBILITY GOAL STATUS: HCPCS | Performed by: PHYSICAL THERAPIST

## 2018-01-20 RX ADMIN — FOLIC ACID 1 MG: 1 TABLET ORAL at 08:43

## 2018-01-20 RX ADMIN — OXYCODONE HYDROCHLORIDE AND ACETAMINOPHEN 1 TABLET: 7.5; 325 TABLET ORAL at 21:09

## 2018-01-20 RX ADMIN — OXYCODONE HYDROCHLORIDE AND ACETAMINOPHEN 2 TABLET: 7.5; 325 TABLET ORAL at 01:31

## 2018-01-20 RX ADMIN — OXYCODONE HYDROCHLORIDE AND ACETAMINOPHEN 1 TABLET: 7.5; 325 TABLET ORAL at 17:16

## 2018-01-20 RX ADMIN — CARVEDILOL 12.5 MG: 6.25 TABLET, FILM COATED ORAL at 08:44

## 2018-01-20 RX ADMIN — BISACODYL 5 MG: 5 TABLET, COATED ORAL at 17:25

## 2018-01-20 RX ADMIN — CARVEDILOL 12.5 MG: 6.25 TABLET, FILM COATED ORAL at 17:16

## 2018-01-20 RX ADMIN — CEFAZOLIN 1 G: 1 INJECTION, POWDER, FOR SOLUTION INTRAMUSCULAR; INTRAVENOUS at 19:18

## 2018-01-20 RX ADMIN — OXYCODONE HYDROCHLORIDE AND ACETAMINOPHEN 2 TABLET: 7.5; 325 TABLET ORAL at 07:53

## 2018-01-20 RX ADMIN — PREDNISONE 2 MG: 1 TABLET ORAL at 08:43

## 2018-01-20 RX ADMIN — FLUTICASONE PROPIONATE 2 SPRAY: 50 SPRAY, METERED NASAL at 09:52

## 2018-01-20 RX ADMIN — CLONIDINE HYDROCHLORIDE 0.2 MG: 0.2 TABLET ORAL at 21:09

## 2018-01-20 RX ADMIN — SODIUM CHLORIDE 50 ML/HR: 9 INJECTION, SOLUTION INTRAVENOUS at 04:22

## 2018-01-20 RX ADMIN — CEFAZOLIN 1 G: 1 INJECTION, POWDER, FOR SOLUTION INTRAMUSCULAR; INTRAVENOUS at 04:14

## 2018-01-20 RX ADMIN — PANTOPRAZOLE SODIUM 40 MG: 40 TABLET, DELAYED RELEASE ORAL at 08:46

## 2018-01-20 RX ADMIN — PREGABALIN 50 MG: 50 CAPSULE ORAL at 08:46

## 2018-01-20 RX ADMIN — SODIUM CHLORIDE 50 ML/HR: 9 INJECTION, SOLUTION INTRAVENOUS at 21:09

## 2018-01-20 RX ADMIN — LEVOTHYROXINE SODIUM 75 MCG: 75 TABLET ORAL at 06:32

## 2018-01-20 RX ADMIN — OXYCODONE HYDROCHLORIDE AND ACETAMINOPHEN 1 TABLET: 7.5; 325 TABLET ORAL at 12:24

## 2018-01-20 RX ADMIN — CETIRIZINE HYDROCHLORIDE 10 MG: 10 TABLET, FILM COATED ORAL at 08:44

## 2018-01-20 RX ADMIN — CEFAZOLIN 1 G: 1 INJECTION, POWDER, FOR SOLUTION INTRAMUSCULAR; INTRAVENOUS at 11:38

## 2018-01-20 NOTE — DISCHARGE PLACEMENT REQUEST
"To:  [facility]  From:  Clarissa Nixon, BSW  [phone]    Laura Shin (62 y.o. Female)     Date of Birth Social Security Number Address Home Phone MRN    1955  [address] [facility] [address] [phone] [id number]    Nondenominational Marital Status          Faith        Admission Date Admission Type Admitting Provider Attending Provider Department, Room/Bed    1/17/18 Elective Fortino Oropeza MD Hill, Clint Patrick, MD [facility] 3A, 354/1    Discharge Date Discharge Disposition Discharge Destination                      Attending Provider: Fortino Oropeza MD     Allergies:  Atorvastatin, Amoxicillin, Nabumetone, Niacin Er, Penicillins, Simvastatin    Isolation:  None   Infection:  None   Code Status:  FULL    Ht:  165 cm (64.96\")   Wt:  93.8 kg (206 lb 12.7 oz)    Admission Cmt:  None   Principal Problem:  None                Active Insurance as of 1/17/2018     Primary Coverage     Payor Plan Insurance Group Employer/Plan Group    MEDICARE MEDICARE A & B      Payor Plan Address Payor Plan Phone Number Effective From Effective To    [address] [phone] 3/1/2014     [place] [address]       Subscriber Name Subscriber Birth Date Member ID       LAURA SHIN 1955 [id number]           Secondary Coverage     Payor Plan Insurance Group Employer/Plan Group    AETNA COMMERCIAL AETNA [facility] [id number]     Payor Plan Address Payor Plan Phone Number Effective From Effective To    [address] [phone] 1/23/2015     [place] [address]       Subscriber Name Subscriber Birth Date Member ID       NOEL SHIN 1955 [id number]                 Emergency Contacts      (Rel.) Home Phone Work Phone Mobile Phone    Noel Shin (Spouse) [phone] -- --    Javed Shin (Son) -- [phone] --        Inpatient Consult to Case Management  [[id number]] (Order [id number])   Consult   Date: 1/20/2018 [facility] Released By: Rita Estes RN (auto-released) Authorizing: Fortino Oropeza MD   Order History   Inpatient   Date/Time Action Taken User Additional Information   01/20/18 0944 Release Rita Estes RN (auto-released) From Order: [id number]   Order Details   Frequency Duration Priority Order Class   Once 1  occurrence Routine Hospital Performed   Order Questions   Question Answer Comment   Reason for Consult? pt wants to go to [facility]    Verbal Order Info   Action Created on Order Mode Entered by Responsible Provider Signed by Signed on   Ordering 01/20/18 0944 Verbal with readback SARAH Meng MD     Consult Order Info   ID Description Priority Start Date Start Time   [id number] Inpatient Consult to Case Management  Routine 01/20/2018  9:44 AM   Provider Specialty Referred to   ______________________________________ _____________________________________   Acknowledgement Info   For At Acknowledged By Acknowledged On   Placing Order 01/20/18 0944 Kellee Matos RN 01/20/18 0955   Reprint Order Requisition   Inpatient Consult to Case Management  (Order #[id number]) on 1/20/18   Encounter   View Encounter       Order Provider Info       Office phone Pager E-mail   Ordering User Rita Estes RN -- -- --   Authorizing Provider Fortino Oropeza [phone] -- --   Attending Provider Fortino Oropeza [phone] -- --   Order Transmittal Tracking   Inpatient Consult to Case Management  (Order #[id number]) on 1/20/18            History & Physical      H&P filed by Melo Barajas MD at 1/11/2018 11:13 AM      Scan on 1/17/2018 : HISTORY AND PHYSICAL 1/17/18 (below)              Electronically signed by Interface, Scans Incoming at 1/11/2018 11:13 AM      Fortino Oropeza MD at 1/17/2018  2:07 PM          H&P reviewed. The patient was examined and there are no changes to the H&P.     Electronically  signed by Fortino Oropeza MD at 1/17/2018  2:07 PM    Source Note          Scan on 1/17/2018 : HISTORY AND PHYSICAL 1/17/18 (below)              Electronically signed by Interface, Scans Incoming at 1/11/2018 11:13 AM              Prior to Admission Medications     Prescriptions Last Dose Informant Patient Reported? Taking?    HUMIRA PEN 40 MG/0.8ML Pen-injector Kit Past Month  Yes Yes    Inject 40 mg under the skin Take As Directed. EVERY 10 DAYS    HYDROcodone-acetaminophen (NORCO)  MG per tablet 1/16/2018 Self Yes Yes    Take 1 tablet by mouth Every 6 (Six) Hours As Needed for Moderate Pain .    aspirin 81 MG tablet 1/10/2018  Yes No    Take 81 mg by mouth daily.    BRILINTA 90 MG tablet tablet 1/10/2018  Yes No    Take 90 mg by mouth 2 (Two) Times a Day.    carvedilol (COREG) 12.5 MG tablet 1/17/2018  Yes No    Take 12.5 mg by mouth 2 (Two) Times a Day With Meals.    cetirizine (zyrTEC) 10 MG tablet 1/16/2018  Yes No    Take 10 mg by mouth daily     CloNIDine (CATAPRES) 0.1 MG tablet 1/16/2018  Yes No    Take 0.2 mg by mouth Every Night.    fluticasone (FLONASE) 50 MCG/ACT nasal spray 1/16/2018  No No    2 sprays into each nostril Daily.    folic acid (FOLVITE) 1 MG tablet 1/16/2018  Yes No    Take 1 mg by mouth daily.      furosemide (LASIX) 40 MG tablet 1/16/2018  Yes No    Take 40 mg by mouth daily.    isosorbide mononitrate (IMDUR) 30 MG 24 hr tablet 1/17/2018  Yes No    Take 30 mg by mouth Daily.    leflunomide (ARAVA) 20 MG tablet More than a month  Yes No    Take 20 mg by mouth daily.      levothyroxine (SYNTHROID, LEVOTHROID) 75 MCG tablet 1/16/2018  Yes No    Take 75 mcg by mouth daily.    LYRICA 50 MG capsule 1/16/2018  Yes No    Take 50 mg by mouth Daily.    MULTIPLE VITAMIN PO 1/16/2018  Yes No    Take 1 tablet by mouth daily.      nitroglycerin (NITROSTAT) 0.4 MG SL tablet Unknown  Yes No    Place 1 tablet under the tongue every 5 minutes as needed for Chest pain    oseltamivir (TAMIFLU)  75 MG capsule 1/16/2018  Yes No    Take 75 mg by mouth Daily.  Dx FLU    pantoprazole (PROTONIX) 40 MG EC tablet 1/16/2018  Yes No    Take 40 mg by mouth Daily.    predniSONE (DELTASONE) 1 MG tablet 1/16/2018  Yes No    Take 2 mg by mouth Daily.    PROLIA 60 MG/ML solution syringe More than a month  No No    INJECT 60MG SUB-Q EVERY 6 MONTHS    salsalate (DISALCID) 500 MG tablet More than a month  Yes No    TAKE 5 PILLS DAILY MONDAY, WEDNESDAY, FRIDAY AND SUNDAY & 4 PILLS DAILY TUESDAY, THURSDAY AND SATURDAY    spironolactone (ALDACTONE) 25 MG tablet 1/16/2018  Yes No    Take 25 mg by mouth Daily.    traMADol-acetaminophen (ULTRACET) 37.5-325 MG per tablet 1/15/2018  Yes No    Take 1 tablet by mouth 2 (Two) Times a Day.    valACYclovir (VALTREX) 500 MG tablet 1/16/2018  Yes No    Take 500 mg by mouth Daily.    vitamin D (ERGOCALCIFEROL) 72945 units capsule capsule 1/14/2018  Yes No    Take 50,000 Units by mouth Every 7 (Seven) Days.    ZETIA 10 MG tablet 1/16/2018  Yes No    Take 10 mg by mouth Daily.          Hospital Medications (active)       Dose Frequency Start End    acetaminophen (TYLENOL) tablet 650 mg 650 mg Every 4 Hours PRN 1/17/2018     Sig - Route: Take 2 tablets by mouth Every 4 (Four) Hours As Needed for Mild Pain . - Oral    ALPRAZolam (XANAX) tablet 0.5 mg 0.5 mg 3 Times Daily PRN 1/17/2018 1/27/2018    Sig - Route: Take 1 tablet by mouth 3 (Three) Times a Day As Needed for Anxiety. - Oral    bisacodyl (DULCOLAX) EC tablet 5 mg 5 mg Daily PRN 1/17/2018     Sig - Route: Take 1 tablet by mouth Daily As Needed for Constipation. - Oral    carvedilol (COREG) tablet 12.5 mg 12.5 mg 2 Times Daily With Meals 1/17/2018     Sig - Route: Take 2 tablets by mouth 2 (Two) Times a Day With Meals. - Oral    ceFAZolin (ANCEF) 1 g/10ml IV PUSH syringe 1 g Every 8 Hours 1/19/2018 1/21/2018    Sig - Route: Infuse 10 mL into a venous catheter Every 8 (Eight) Hours. - Intravenous    cetirizine (zyrTEC) tablet 10 mg  "10 mg Daily 1/17/2018     Sig - Route: Take 1 tablet by mouth Daily. - Oral    CloNIDine (CATAPRES) tablet 0.2 mg 0.2 mg Nightly 1/17/2018     Sig - Route: Take 1 tablet by mouth Every Night. - Oral    cyclobenzaprine (FLEXERIL) tablet 10 mg 10 mg 3 Times Daily PRN 1/17/2018     Sig - Route: Take 1 tablet by mouth 3 (Three) Times a Day As Needed for Muscle Spasms. - Oral    docusate sodium (COLACE) capsule 100 mg 100 mg 2 Times Daily PRN 1/17/2018     Sig - Route: Take 1 capsule by mouth 2 (Two) Times a Day As Needed for Constipation. - Oral    fluticasone (FLONASE) 50 MCG/ACT nasal spray 2 spray 2 spray Daily 1/17/2018     Sig - Route: 2 sprays into each nostril Daily. - Nasal    folic acid (FOLVITE) tablet 1 mg 1 mg Daily 1/17/2018     Sig - Route: Take 1 tablet by mouth Daily. - Oral    furosemide (LASIX) tablet 40 mg 40 mg Daily 1/17/2018     Sig - Route: Take 1 tablet by mouth Daily. - Oral    HYDROcodone-acetaminophen (NORCO) 7.5-325 MG per tablet 1 tablet 1 tablet Every 4 Hours PRN 1/17/2018 1/27/2018    Sig - Route: Take 1 tablet by mouth Every 4 (Four) Hours As Needed for Moderate Pain . - Oral    HYDROmorphone (DILAUDID) injection 0.5 mg 0.5 mg As Needed 1/19/2018 1/19/2018    Sig - Route: Infuse 0.5 mL into a venous catheter As Needed for Moderate Pain  or Severe Pain . - Intravenous    HYDROmorphone (DILAUDID) injection 1 mg 1 mg Every 2 Hours PRN 1/17/2018 1/27/2018    Sig - Route: Infuse 1 mL into a venous catheter Every 2 (Two) Hours As Needed for Severe Pain . - Intravenous    Linked Group 1:  \"And\" Linked Group Details        isosorbide mononitrate (IMDUR) 24 hr tablet 30 mg 30 mg Daily 1/18/2018     Sig - Route: Take 1 tablet by mouth Daily. - Oral    ketorolac (TORADOL) injection 30 mg 30 mg Once 1/19/2018 1/19/2018    Sig - Route: Infuse 30 mg into a venous catheter 1 (One) Time. - Intravenous    levothyroxine (SYNTHROID, LEVOTHROID) tablet 75 mcg 75 mcg Every Early Morning 1/18/2018     Sig - " "Route: Take 1 tablet by mouth Every Morning. - Oral    magnesium hydroxide (MILK OF MAGNESIA) suspension 2400 mg/10mL 10 mL 10 mL Daily PRN 1/17/2018     Sig - Route: Take 10 mL by mouth Daily As Needed for Constipation. - Oral    melatonin tablet 3 mg 3 mg Nightly PRN 1/18/2018     Sig - Route: Take 1 tablet by mouth At Night As Needed for Sleep. - Oral    naloxone (NARCAN) injection 0.4 mg 0.4 mg Every 5 Minutes PRN 1/17/2018     Sig - Route: Infuse 1 mL into a venous catheter Every 5 (Five) Minutes As Needed for Respiratory Depression. - Intravenous    Linked Group 1:  \"And\" Linked Group Details        nitroglycerin (NITROSTAT) SL tablet 0.4 mg 0.4 mg Every 5 Minutes PRN 1/17/2018     Sig - Route: Place 1 tablet under the tongue Every 5 (Five) Minutes As Needed for Chest Pain. - Sublingual    ondansetron (ZOFRAN) injection 4 mg 4 mg Every 6 Hours PRN 1/17/2018     Sig - Route: Infuse 2 mL into a venous catheter Every 6 (Six) Hours As Needed for Nausea or Vomiting. - Intravenous    Linked Group 2:  \"Or\" Linked Group Details        ondansetron (ZOFRAN) tablet 4 mg 4 mg Every 6 Hours PRN 1/17/2018     Sig - Route: Take 1 tablet by mouth Every 6 (Six) Hours As Needed for Nausea or Vomiting. - Oral    Linked Group 2:  \"Or\" Linked Group Details        ondansetron ODT (ZOFRAN-ODT) disintegrating tablet 4 mg 4 mg Every 6 Hours PRN 1/17/2018     Sig - Route: Take 1 tablet by mouth Every 6 (Six) Hours As Needed for Nausea or Vomiting. - Oral    Linked Group 2:  \"Or\" Linked Group Details        oxyCODONE-acetaminophen (PERCOCET) 7.5-325 MG per tablet 1 tablet 1 tablet Every 4 Hours PRN 1/17/2018 1/27/2018    Sig - Route: Take 1 tablet by mouth Every 4 (Four) Hours As Needed for Moderate Pain . - Oral    oxyCODONE-acetaminophen (PERCOCET) 7.5-325 MG per tablet 2 tablet 2 tablet Every 4 Hours PRN 1/17/2018 1/27/2018    Sig - Route: Take 2 tablets by mouth Every 4 (Four) Hours As Needed for Severe Pain . - Oral    pantoprazole " (PROTONIX) EC tablet 40 mg 40 mg Daily 1/17/2018     Sig - Route: Take 1 tablet by mouth Daily. - Oral    polyethylene glycol 3350 powder (packet) 17 g Daily PRN 1/17/2018     Sig - Route: Take 17 g by mouth Daily As Needed for Constipation (constipation). - Oral    predniSONE (DELTASONE) tablet 2 mg 2 mg Daily 1/17/2018     Sig - Route: Take 2 tablets by mouth Daily. - Oral    pregabalin (LYRICA) capsule 50 mg 50 mg Daily 1/17/2018     Sig - Route: Take 1 capsule by mouth Daily. - Oral    promethazine (PHENERGAN) injection 12.5 mg 12.5 mg Every 6 Hours PRN 1/17/2018     Sig - Route: Inject 0.5 mL into the shoulder, thigh, or buttocks Every 6 (Six) Hours As Needed for Nausea or Vomiting. - Intramuscular    sodium chloride 0.9 % flush 1-10 mL 1-10 mL As Needed 1/17/2018     Sig - Route: Infuse 1-10 mL into a venous catheter As Needed for Line Care. - Intravenous    sodium chloride 0.9 % infusion 50 mL/hr Continuous 1/17/2018     Sig - Route: Infuse 50 mL/hr into a venous catheter Continuous. - Intravenous    spironolactone (ALDACTONE) tablet 25 mg 25 mg Daily 1/17/2018     Sig - Route: Take 1 tablet by mouth Daily. - Oral    atropine sulfate injection 0.5 mg (Discontinued) 0.5 mg Once As Needed 1/19/2018 1/19/2018    Sig - Route: Infuse 5 mL into a venous catheter 1 (One) Time As Needed for Bradycardia (symptomatic bradycardia (hypotension, dizziness, confusion). Notify attending anesthesiologist.). - Intravenous    Reason for Discontinue: Patient Transfer    ceFAZolin (ANCEF) injection (Discontinued)  As Needed 1/19/2018 1/19/2018    Sig - Route: Infuse  into a venous catheter As Needed. - Intravenous    Reason for Discontinue: Anesthesia Stop    ceFAZolin (ANCEF) IVPB (duplex) 2 g (Discontinued) 2 g On Call to O.R. 1/19/2018 1/19/2018    Sig - Route: Infuse 2,000 mg into a venous catheter On Call to the Operating Room. - Intravenous    Reason for Discontinue: Patient Transfer    flumazenil (ROMAZICON) injection 0.2  mg (Discontinued) 0.2 mg As Needed 1/19/2018 1/19/2018    Sig - Route: Infuse 2 mL into a venous catheter As Needed (unresponsiveness). - Intravenous    Reason for Discontinue: Patient Transfer    gelatin absorbable (GELFOAM) powder (Discontinued)  As Needed 1/19/2018 1/19/2018    Sig: As Needed.    Reason for Discontinue: Patient Discharge    hydrALAZINE (APRESOLINE) injection 5 mg (Discontinued) 5 mg Every 10 Minutes PRN 1/19/2018 1/19/2018    Sig - Route: Infuse 0.25 mL into a venous catheter Every 10 (Ten) Minutes As Needed for High Blood Pressure (for systolic blood pressure greater than 180 mmHg or diastolic blood pressure greater than 105 mmHg when HR less than 60). - Intravenous    Reason for Discontinue: Patient Transfer    ipratropium-albuterol (DUO-NEB) nebulizer solution 3 mL (Discontinued) 3 mL Once As Needed 1/19/2018 1/19/2018    Sig - Route: Take 3 mL by nebulization 1 (One) Time As Needed for Wheezing or Shortness of Air (bronchospasm). - Nebulization    Reason for Discontinue: Patient Transfer    labetalol (NORMODYNE,TRANDATE) injection 5 mg (Discontinued) 5 mg Every 5 Minutes PRN 1/19/2018 1/19/2018    Sig - Route: Infuse 1 mL into a venous catheter Every 5 (Five) Minutes As Needed for High Blood Pressure (for systolic blood pressure greater than 180 mmHg or diastolic blood pressure greater than 105 mmHg). - Intravenous    Reason for Discontinue: Patient Transfer    lactated ringers infusion (Discontinued) 100 mL/hr Continuous 1/19/2018 1/19/2018    Sig - Route: Infuse 100 mL/hr into a venous catheter Continuous. - Intravenous    lactated ringers infusion (Discontinued) 30 mL/hr Continuous 1/19/2018 1/19/2018    Sig - Route: Infuse 30 mL/hr into a venous catheter Continuous. - Intravenous    lidocaine (XYLOCAINE) 2% injection (Discontinued)  As Needed 1/19/2018 1/19/2018    Sig - Route: Inject  as directed As Needed. - Injection    Reason for Discontinue: Anesthesia Stop    meperidine (DEMEROL)  "injection 12.5 mg (Discontinued) 12.5 mg Every 5 Minutes PRN 1/19/2018 1/19/2018    Sig - Route: Infuse 0.5 mL into a venous catheter Every 5 (Five) Minutes As Needed for Shivering (May repeat x 3). - Intravenous    Reason for Discontinue: Patient Transfer    metoclopramide (REGLAN) injection 5 mg (Discontinued) 5 mg Every 15 Minutes PRN 1/19/2018 1/19/2018    Sig - Route: Infuse 1 mL into a venous catheter Every 15 (Fifteen) Minutes As Needed for Nausea or Vomiting (for nausea/vomiting). - Intravenous    Reason for Discontinue: Patient Transfer    midazolam (VERSED) injection 1 mg (Discontinued) 1 mg Every 5 Minutes PRN 1/19/2018 1/19/2018    Sig - Route: Infuse 1 mL into a venous catheter Every 5 (Five) Minutes As Needed for Anxiety (Max 4mg midazolam TOTAL). - Intravenous    Reason for Discontinue: Patient Transfer    Linked Group 3:  \"Or\" Linked Group Details        midazolam (VERSED) injection 2 mg (Discontinued) 2 mg Every 5 Minutes PRN 1/19/2018 1/19/2018    Sig - Route: Infuse 2 mL into a venous catheter Every 5 (Five) Minutes As Needed for Anxiety (Max 4mg midazolam TOTAL). - Intravenous    Reason for Discontinue: Patient Transfer    Linked Group 3:  \"Or\" Linked Group Details        Morphine sulfate (PF) injection 1 mg (Discontinued) 1 mg Every 4 Hours PRN 1/17/2018 1/19/2018    Sig - Route: Infuse 0.5 mL into a venous catheter Every 4 (Four) Hours As Needed for Moderate Pain . - Intravenous    Linked Group 4:  \"And\" Linked Group Details        Morphine sulfate (PF) injection 2 mg (Discontinued) 2 mg As Needed 1/19/2018 1/19/2018    Sig - Route: Infuse 1 mL into a venous catheter As Needed for Mild Pain . - Intravenous    Reason for Discontinue: Patient Transfer    naloxone (NARCAN) injection 0.04 mg (Discontinued) 0.04 mg As Needed 1/19/2018 1/19/2018    Sig - Route: Infuse 0.1 mL into a venous catheter As Needed for Opioid Reversal (unresponsiveness, decrease oxygen saturation). - Intravenous    Reason " "for Discontinue: Patient Transfer    naloxone (NARCAN) injection 0.4 mg (Discontinued) 0.4 mg Every 5 Minutes PRN 1/17/2018 1/19/2018    Sig - Route: Infuse 1 mL into a venous catheter Every 5 (Five) Minutes As Needed for Respiratory Depression. - Intravenous    Linked Group 4:  \"And\" Linked Group Details        ondansetron (ZOFRAN) injection 4 mg (Discontinued) 4 mg As Needed 1/19/2018 1/19/2018    Sig - Route: Infuse 2 mL into a venous catheter As Needed for Nausea or Vomiting. - Intravenous    Reason for Discontinue: Patient Transfer    oseltamivir (TAMIFLU) capsule 75 mg (Discontinued) 75 mg Daily 1/17/2018 1/19/2018    Sig - Route: Take 1 capsule by mouth Daily. - Oral    Propofol (DIPRIVAN) injection (Discontinued)  As Needed 1/19/2018 1/19/2018    Sig: As Needed.    Reason for Discontinue: Anesthesia Stop    sodium chloride (NS) irrigation solution (Discontinued)  As Needed 1/19/2018 1/19/2018    Sig: As Needed.    Reason for Discontinue: Patient Discharge    sodium chloride 0.9 % flush 1-10 mL (Discontinued) 1-10 mL As Needed 1/17/2018 1/19/2018    Sig - Route: Infuse 1-10 mL into a venous catheter As Needed for Line Care. - Intravenous    sodium chloride 0.9 % flush 1-10 mL (Discontinued) 1-10 mL As Needed 1/19/2018 1/19/2018    Sig - Route: Infuse 1-10 mL into a venous catheter As Needed for Line Care. - Intravenous    Reason for Discontinue: Patient Transfer    sodium chloride 0.9 % flush 1-10 mL (Discontinued) 1-10 mL As Needed 1/19/2018 1/19/2018    Sig - Route: Infuse 1-10 mL into a venous catheter As Needed for Line Care. - Intravenous    Reason for Discontinue: Patient Transfer    sodium chloride 0.9 % solution (Discontinued)  As Needed 1/19/2018 1/19/2018    Sig: As Needed.    Reason for Discontinue: Patient Discharge    succinylcholine (ANECTINE) injection (Discontinued)  As Needed 1/19/2018 1/19/2018    Sig - Route: Infuse  into a venous catheter As Needed. - Intravenous    Reason for Discontinue: " Anesthesia Stop    SUFentanil (SUFENTA) injection (Discontinued)  As Needed 1/19/2018 1/19/2018    Sig - Route: Infuse  into a venous catheter As Needed for Severe Pain . - Intravenous    Reason for Discontinue: Anesthesia Stop    thrombin spray (Discontinued)  As Needed 1/19/2018 1/19/2018    Sig: As Needed.    Reason for Discontinue: Patient Discharge    valACYclovir (VALTREX) tablet 500 mg (Discontinued) 500 mg Daily 1/17/2018 1/19/2018    Sig - Route: Take 1 tablet by mouth Daily. - Oral             Operative/Procedure Notes (most recent note)      VIRGIL Rodriguez at 1/19/2018  1:49 PM  Version 1 of 1         LUMBAR FUSION DECOMPRESSON WITH PEDICLE SCREWS  Progress Note    Tawny Shin  1/17/2018 - 1/19/2018    Pre-op Diagnosis:   LUMBAR STENOSIS        Post-Op Diagnosis Codes:       Procedure/CPT® Codes:      Procedure(s):  L3-4,L4-5 DECOMPRESSION, POSTERIOR SPINAL FUSION WITH INSTRUMENTATION    Surgeon(s):  Fortino Oropeza MD    Anesthesia: General    Staff:   Circulator: Sebastián Warner RN; Nino Davies RN  Scrub Person: Yumiko Christianson; Vlad Blanca  Vendor Representative: Aron Walter    Estimated Blood Loss: 200ml    Urine Voided: * No values recorded between 1/19/2018 11:01 AM and 1/19/2018  1:48 PM *    Specimens:                None      Drains:           Findings: SEE OPERATIVE REPORT    Complications: NONE      VIRGIL Rodriguez     Date: 1/19/2018  Time: 1:49 PM         Electronically signed by VIRGIL Rodriguez at 1/19/2018  1:50 PM           Physician Progress Notes (most recent note)      VIRGIL Coy at 1/20/2018  8:45 AM  Version 1 of 1         Orthopedic Spine Progress Note    Tawny Shin  62 y.o.  female  Subjective     Post-Operative Day # 3,1    Systemic or Specific Complaints:     Pain better controlled today, left leg pain better weakness same. Small fall when standing this am.     Objective     Vital signs in last 24  hours:  Temp:  [97.3 °F (36.3 °C)-98 °F (36.7 °C)] 98 °F (36.7 °C)  Heart Rate:  [59-74] 71  Resp:  [12-16] 16  BP: (110-168)/(47-93) 110/50    Physical Exam:    A&O x3  B.LE NVI   Abdomen soft  Dressing CDI    Diagnostic Data:  CBC:    Results from last 7 days  Lab Units 18  0447   WBC 10*3/mm3 6.43   RBC 10*6/mm3 4.01*   HEMOGLOBIN g/dL 11.9*   HEMATOCRIT % 36.0*   PLATELETS 10*3/mm3 155          Assessment/Plan     1. Status Post LLIF L3-L5 POD 3, Zee/PSF L3-L5 POD 1      Plan:  1. PT/OT today   2. Continue current pain medications   3. Knee immobilizer during ambulation   4. LSO when OOB  5. Rehab consult       VIRGIL Coy    Date: 2018  Time: 8:45 AM     Electronically signed by VIRGIL Coy at 2018  8:47 AM        Consult Notes (most recent note)     No notes of this type exist for this encounter.        Nutrition Notes (most recent note)     No notes of this type exist for this encounter.           Physical Therapy Notes (most recent note)      Nicole Olson, PT DPT at 2018 11:39 AM  Version 1 of 1         Acute Care - Physical Therapy Initial Evaluation  [facility]     Patient Name: Tawny Shin  : 1955  MRN: [id number]  Today's Date: 2018   Onset of Illness/Injury or Date of Surgery Date: 18  Date of Referral to PT: 18  Referring Physician: Dr. Oropeza      Admit Date: 2018     Visit Dx:    ICD-10-CM ICD-9-CM   1. Impaired mobility and ADLs Z74.09 799.89   2. Impaired mobility Z74.09 799.89     Patient Active Problem List   Diagnosis   • Eustachian tube dysfunction   • Sensorineural hearing loss   • Lumbago of multiple sites in spine with sciatica   • Spondylolisthesis of lumbar region     Past Medical History:   Diagnosis Date   • Arthritis    • Asthma    • Chronic sinusitis    • Eustachian tube dysfunction    • Heart disease    • Herpes simplex    • Hypertension    • Laryngitis sicca    • Laryngitis, chronic    • MI (myocardial  infarction)    • Otorrhea    • Sensorineural hearing loss    • Sjogren's disease      Past Surgical History:   Procedure Laterality Date   • A-V CARDIAC PACEMAKER INSERTION  2016   • ATRIAL CARDIAC PACEMAKER INSERTION     • CORONARY ANGIOPLASTY WITH STENT PLACEMENT      X 2; 2013 & 2014   • MYRINGOTOMY W/ TUBES  09/04/2014    TUBES NO LONGER IN PLACE          PT ASSESSMENT (last 72 hours)      PT Evaluation       01/18/18 0800 01/18/18 0739    Rehab Evaluation    Document Type  evaluation  -MS    Subjective Information  agree to therapy;complains of;weakness;pain   L hip flex weakness  -MS    General Information    Patient Profile Review  yes  -MS    Onset of Illness/Injury or Date of Surgery Date  01/17/18  -MS    Referring Physician  Dr. Oropeza  -MS    General Observations  pt lying in bed, awake and alert in no apparent distress  -MS    Pertinent History Of Current Problem  R sided back pain, buttock and leg pain; Dx: lumbar stenosis L3-5, DDD, lumbar radiculopathy, L4-5 disc herniation w/extruded fragment, spondylolisthesis L3-5 s/p L sided LLIF via retroperitoneal approach on 1/17/18  -MS    Precautions/Limitations  brace on when up;fall precautions;spinal precautions   L leg buddy  -MS    Plans/Goals Discussed With  patient;agreed upon  -MS    Risks Reviewed  patient:;LOB;nausea/vomiting;dizziness;increased discomfort  -MS    Benefits Reviewed  patient:;improve function;increase independence;increase strength;increase balance;decrease pain;increase knowledge  -MS    Barriers to Rehab  physical barrier  -MS    Clinical Impression    Date of Referral to PT  01/17/18  -MS    Patient/Family Goals Statement  walk and move left leg  -MS    Criteria for Skilled Therapeutic Interventions Met  yes;treatment indicated  -MS    Pathology/Pathophysiology Noted (Describe Specifically for Each System)  musculoskeletal  -MS    Impairments Found (describe specific impairments)  gait, locomotion, and balance;muscle  performance;ROM;sensory Integrity  -MS    Rehab Potential  good, to achieve stated therapy goals  -MS    Predicted Duration of Therapy Intervention (days/wks)  until discharge  -MS    Vital Signs    Pre SpO2 (%)  99  -MS    O2 Delivery Pre Treatment  supplemental O2   3L  -MS    Intra SpO2 (%)  94  -MS    O2 Delivery Intra Treatment  room air  -MS    Post SpO2 (%)  97  -MS    O2 Delivery Post Treatment  room air  -MS    Pre Patient Position  Supine  -MS    Intra Patient Position  Sitting  -MS    Post Patient Position  Sitting  -MS    Pain Assessment    Pain Assessment  0-10  -MS    Pain Score  4  -MS    Pain Type  Acute pain;Surgical pain  -MS    Pain Location  Back  -MS    Pain Orientation  Left  -MS    Pain Radiating Towards  L flank, L thigh  -MS    Pain Descriptors  Aching;Throbbing  -MS    Pain Intervention(s)  Medication (See MAR);Repositioned;Ambulation/increased activity  -MS    Response to Interventions  reports increase in pain  -MS    Cognitive Assessment/Intervention    Current Cognitive/Communication Assessment  functional  -MS    Orientation Status  oriented x 4  -MS    Follows Commands/Answers Questions  100% of the time;able to follow single-step instructions  -MS    Personal Safety  WNL/WFL  -MS    Personal Safety Interventions  fall prevention program maintained;gait belt;muscle strengthening facilitated;nonskid shoes/slippers when out of bed;supervised activity  -MS    ROM (Range of Motion)    General ROM  lower extremity range of motion deficits identified  -MS    General LE Assessment    ROM Detail  L hip flexion impaired 75%  -MS    MMT (Manual Muscle Testing)    General MMT Assessment  lower extremity strength deficits identified  -MS    Lower Extremity    Lower Ext Manual Muscle Testing Detail  L hip flexion 2/5  -MS    Bed Mobility, Assessment/Treatment    Bed Mobility, Assistive Device  bed rails  -MS    Bed Mobility, Roll Left, [place]  contact guard assist;verbal cues  required;nonverbal cues required (demo/gesture)  -MS    Bed Mob, Sidelying to Sit, [place]  minimum assist (75% patient effort);verbal cues required;nonverbal cues required (demo/gesture)   min A for L LE for hip flexion  -MS    Bed Mobility, Impairments  strength decreased  -MS    Transfer Assessment/Treatment    Transfers, Bed-Chair [place]  minimum assist (75% patient effort);2 person assist required;verbal cues required;nonverbal cues required (demo/gesture)   bed to BSC  -MS    Transfers, Chair-Bed [place]  minimum assist (75% patient effort);2 person assist required;verbal cues required;nonverbal cues required (demo/gesture)   BSC to chair  -MS    Transfers, Sit-Stand [place]  minimum assist (75% patient effort);2 person assist required;verbal cues required;nonverbal cues required (demo/gesture)  -MS    Transfers, Stand-Sit [place]  minimum assist (75% patient effort);2 person assist required;verbal cues required;nonverbal cues required (demo/gesture)  -MS    Transfers, Sit-Stand-Sit, Assist Device  rolling walker   once HHA x2 and once with RW  -MS    Transfer, Safety Issues  knees buckling;step length decreased;weight-shifting ability decreased   L leg buckling  -MS    Transfer, Impairments  ROM decreased;sensation decreased;strength decreased;impaired balance;motor control impaired;pain  -MS    Transfer, Comment  pt required blocking of her L leg to prevent buckling during her first sit to stand. She was able to use a walker for her second sit to stand without leg blocking  -MS    Gait Assessment/Treatment    Gait, Comment  unable to due to L hip flexion weakness  -MS    Motor Skills/Interventions    Additional Documentation  Balance Skills Training (Group)  -MS    Balance Skills Training    Sitting-Level of Assistance  Independent  -MS    Sitting-Balance Support  Feet supported  -MS    Standing-Level of Assistance  Contact guard;x2  -MS    Static Standing Balance Support  assistive  "device  -MS    Standing Balance # of Minutes  CGA x2 with L knee was in full extension but min A when knee is bent  -MS    Gait Balance-Level of Assistance  Minimum assistance;x2  -MS    Gait Balance Support  assistive device  -MS    Orthotics Prosthetics    Additional Documentation  Orthosis Location (Group)  -MS    Orthosis Location    Orthosis Location/Type  neck/back  -MS    Orthosis, Neck/Back  LSO (lumbar sacral orthosis)  -MS    Orthosis Management/Training    Orthosis Fabrication Detail  pt owns LSO however it is not present at this time. Her  is bringing it today  -MS    Sensory Assessment/Intervention    Light Touch  LLE  -MS    LLE Light Touch mild impairment  -SC mild impairment   L3 dermatome \"dull\"  -MS    Positioning and Restraints    Post Treatment Position  chair  -MS    In Chair  sitting;call light within reach;encouraged to call for assist;with family/caregiver;notified nsg  -MS      01/18/18 0738 01/17/18 9216    Rehab Evaluation    Document Type evaluation  -AC     Subjective Information agree to therapy;complains of;pain  -AC     Symptoms Noted Comment LLE buddy frequently with WBing/ambulation  -AC     General Information    Patient Profile Review yes  -AC     Onset of Illness/Injury or Date of Surgery Date 01/17/18  -     Referring Physician Dr. Oropeza  -     General Observations lying supine in bed, no apparent distress, 3L O2 per nc  -AC     Pertinent History Of Current Problem R sided back pain, buttock and leg pain; Dx: lumbar stenosis L3-5, DDD, lumbar radiculopathy, L4-5 disc herniation w/extruded fragment, spondylolisthesis L3-5 s/p L sided LLIF via retroperitoneal approach on 1/17/18 (XR L spine)  -AC     Precautions/Limitations brace on when up;spinal precautions  -AC     Prior Level of Function independent:;all household mobility;community mobility;gait;transfer;ADL's;home management;cooking;cleaning;driving;shopping  -AC     Equipment Currently Used at Home cane, " straight;walker, rolling;raised toilet  -AC     Plans/Goals Discussed With patient;agreed upon  -AC     Risks Reviewed patient:;LOB;nausea/vomiting;dizziness;increased discomfort;change in vital signs;increased drainage;lines disloged  -AC     Benefits Reviewed patient:;improve function;increase independence;increase strength;increase balance;decrease pain;decrease risk of DVT;improve skin integrity;increase knowledge  -AC     Barriers to Rehab none identified  -AC     Living Environment    Lives With spouse  -AC spouse  -AB    Living Arrangements house  -AC house  -AB    Home Accessibility bed and bath on same level;stairs to enter home;stairs within home;other (see comments)   walk in shower  -AC no concerns  -AB    Number of Stairs to Enter Home 2  -AC     Number of Stairs Within Home 11  -AC     Stair Railings at Home inside, present on left side  -AC none  -AB    Type of Financial/Environmental Concern  none  -AB    Transportation Available  car  -AB    Living Environment Comment spouse available evenings  -AC     Vital Signs    Pre Systolic BP Rehab 130  -AC     Pre Treatment Diastolic BP 66  -AC     Pre SpO2 (%) 99  -AC     O2 Delivery Pre Treatment supplemental O2   3L  -AC     Pre Patient Position Supine  -AC     Pain Assessment    Pain Assessment 0-10  -AC     Pain Score 4  -AC     Pain Type Acute pain;Surgical pain  -AC     Pain Location Back  -AC     Pain Orientation Left  -AC     Pain Radiating Towards L flank, L thigh  -AC     Pain Descriptors Aching;Throbbing  -AC     Pain Intervention(s) Medication (See MAR);Repositioned;Ambulation/increased activity  -AC     Response to Interventions tolerated  -AC     Cognitive Assessment/Intervention    Current Cognitive/Communication Assessment functional  -AC     Orientation Status oriented x 4  -AC     Follows Commands/Answers Questions 100% of the time;able to follow multi-step instructions  -AC     Personal Safety WNL/WFL  -AC     Personal Safety  Interventions fall prevention program maintained;gait belt;nonskid shoes/slippers when out of bed;supervised activity;elopement precautions initiated  -AC     ROM (Range of Motion)    General ROM Detail WFL AROM BUE  -AC     MMT (Manual Muscle Testing)    General MMT Assessment Detail functionally 4+/5 BUE  -AC     Bed Mobility, Assessment/Treatment    Bed Mobility, Assistive Device bed rails  -AC     Bed Mobility, Roll Right, [place] supervision required;verbal cues required  -AC     Bed Mob, Sidelying to Sit, [place] contact guard assist;verbal cues required  -AC     Bed Mobility, Impairments pain  -AC     Transfer Assessment/Treatment    Transfers, Sit-Stand [place] minimum assist (75% patient effort);2 person assist required;verbal cues required  -AC     Transfers, Stand-Sit [place] minimum assist (75% patient effort);verbal cues required  -AC     Transfers, Sit-Stand-Sit, Assist Device rolling walker  -AC     Transfer, Safety Issues knees buckling;other (see comments)   L knee  -AC     Transfer, Impairments strength decreased  -AC     Motor Skills/Interventions    Additional Documentation Balance Skills Training (Group)  -     Balance Skills Training    Sitting-Level of Assistance Independent  -AC     Sitting-Balance Support Feet supported  -     Standing-Level of Assistance Contact guard;x2  -AC     Static Standing Balance Support assistive device  -     Standing Balance # of Minutes able to stand with CGAx2 when using rw for UE suppot  -AC     Gait Balance-Level of Assistance Minimum assistance;x2  -AC     Gait Balance Support assistive device  -     Orthotics Prosthetics    Additional Documentation Orthosis Location (Group);Orthosis Management/Training (Group)  -AC     Orthosis Location    Orthosis Location/Type neck/back  -     Orthosis, Neck/Back LSO (lumbar sacral orthosis)  -     Orthosis Management/Training    Orthosis Fabrication Detail pt already owns LSO but does not  have it with her for eval, family plans to bring it this morning  -     Orthosis Skills Training activity limitations;restrictions/precautions  -     Sensory Assessment/Intervention    Sensory Impairment numbness   c/o numbness in L calf once up EOB  -AC     Positioning and Restraints    Pre-Treatment Position in bed  -AC     Post Treatment Position chair  -     In Chair sitting;call light within reach;encouraged to call for assist;with family/caregiver  -       01/17/18 8358       General Information    Equipment Currently Used at Home none  -AB       User Key  (r) = Recorded By, (t) = Taken By, (c) = Cosigned By    Initials Name Provider Type    AC Sebastián Howe, OTR/L Occupational Therapist    MS Nicole Olson, PT DPT Physical Therapist    AB Ximena Resendez, RN Registered Nurse    JANET Whitmore, RN Registered Nurse          Physical Therapy Education     Title: PT OT SLP Therapies (Active)     Topic: Physical Therapy (Active)     Point: Mobility training (Done)    Learning Progress Summary    Learner Readiness Method Response Comment Documented by Status   Patient Acceptance E VU spinal precautions, exercises for L hip flexion, use of brace once available MS 01/18/18 0854 Done               Point: Precautions (Done)    Learning Progress Summary    Learner Readiness Method Response Comment Documented by Status   Patient Acceptance E VU spinal precautions, exercises for L hip flexion, use of brace once available MS 01/18/18 0854 Done                      User Key     Initials Effective Dates Name Provider Type Discipline    MS 08/02/16 -  Nicole Olson, PT DPT Physical Therapist PT                PT Recommendation and Plan  Anticipated Discharge Disposition: home with assist  Planned Therapy Interventions: balance training, bed mobility training, gait training, home exercise program, orthotic fitting/training, patient/family education, ROM (Range of Motion), strengthening, transfer training  PT  Frequency: 2 times/day  Plan of Care Review  Plan Of Care Reviewed With: patient  Progress: progress toward functional goals as expected  Outcome Summary/Follow up Plan: PT evaluation completed. The patient demonstrates a significant left hip flexion weakness most likely from the lateral approach of the surgery. This weakness limited her ability to stand and attempt to take steps. Her left leg would buckle and she had difficulty with holding herself up with a walker. She does have minimal sensory deficit in the L3 dermatome. No reports of significant pain and LSO not present at this time.  to bring LSO later today. PT will continue to work with the patient to improve her left hip flexion and overall mobility. Assessment for discharge planning will be done after her second surgery on Friday.           IP PT Goals       01/18/18 1133          Bed Mobility PT LTG    Bed Mobility PT LTG, Date Established 01/18/18  -MS      Bed Mobility PT LTG, Time to Achieve by discharge  -MS      Bed Mobility PT LTG, Activity Type all bed mobility  -MS      Bed Mobility PT LTG, [place] Level independent  -MS      Transfer Training PT LTG    Transfer Training PT LTG, Date Established 01/18/18  -MS      Transfer Training PT LTG, Time to Achieve by discharge  -MS      Transfer Training PT LTG, Activity Type sit to stand/stand to sit  -MS      Transfer Training PT LTG, [place] Level conditional independence  -MS      Transfer Training PT LTG, Assist Device walker, rolling  -MS      Gait Training PT LTG    Gait Training Goal PT LTG, Date Established 01/18/18  -MS      Gait Training Goal PT LTG, Time to Achieve by discharge  -MS      Gait Training Goal PT LTG, [place] Level contact guard assist  -MS      Gait Training Goal PT LTG, Assist Device walker, rolling  -MS      Gait Training Goal PT LTG, Distance to Achieve 50ft clearing L foot 50% of the time  -MS      Stair Training PT LTG    Stair Training Goal PT LTG, Date  Established 01/18/18  -MS      Stair Training Goal PT LTG, Time to Achieve by discharge  -MS      Stair Training Goal PT LTG, Number of Steps 3  -MS      Stair Training Goal PT LTG, [place] Level contact guard assist  -MS      Stair Training Goal PT LTG, Assist Device 1 handrail  -MS      Strength Goal PT LTG    Strength Goal PT LTG, Date Established 01/18/18  -MS      Strength Goal PT LTG, Time to Achieve by discharge  -MS      Strength Goal PT LTG, Functional Goal pt will demonstrate full AROM of left hip flexion  -MS        User Key  (r) = Recorded By, (t) = Taken By, (c) = Cosigned By    Initials Name Provider Type    MS Nicole Olson, PT DPT Physical Therapist                Outcome Measures       01/18/18 0841 01/18/18 0739       How much help from another person do you currently need...    Turning from your back to your side while in flat bed without using bedrails?  3  -MS     Moving from lying on back to sitting on the side of a flat bed without bedrails?  3  -MS     Moving to and from a bed to a chair (including a wheelchair)?  3  -MS     Standing up from a chair using your arms (e.g., wheelchair, bedside chair)?  3  -MS     Climbing 3-5 steps with a railing?  1  -MS     To walk in hospital room?  1  -MS     AM-PAC 6 Clicks Score  14  -MS     How much help from another is currently needed...    Putting on and taking off regular lower body clothing? 2  -AC      Bathing (including washing, rinsing, and drying) 2  -AC      Toileting (which includes using toilet bed pan or urinal) 3  -AC      Putting on and taking off regular upper body clothing 4  -AC      Taking care of personal grooming (such as brushing teeth) 3  -AC      Eating meals 4  -AC      Score 18  -AC      Functional Assessment    Outcome Measure Options AM-PAC 6 Clicks Daily Activity (OT)  -AC AM-PAC 6 Clicks Basic Mobility (PT)  -MS       User Key  (r) = Recorded By, (t) = Taken By, (c) = Cosigned By    Initials Name Provider Type    AC  Sebastián Howe, OTR/L Occupational Therapist    MS Nicole Olson, PT DPT Physical Therapist           Time Calculation:         PT Charges       18 1138          Time Calculation    Start Time 0739  -MS      Stop Time 0825  -MS      Time Calculation (min) 46 min  -MS      PT Received On 18  -MS      PT Goal Re-Cert Due Date 18  -MS        User Key  (r) = Recorded By, (t) = Taken By, (c) = Cosigned By    Initials Name Provider Type    MS Nicole Olson, PT DPT Physical Therapist          Therapy Charges for Today     Code Description Service Date Service Provider Modifiers Qty    89204312286 HC PT MOBILITY CURRENT 2018 Nicole Olson, PT DPT GP, CK 1    08173507942 HC PT MOBILITY PROJECTED 2018 Nicole Olson, PT DPT GP, CJ 1    [id number] HC PT EVAL MOD COMPLEXITY 4 2018 Nicole Olson, PT DPT GP, KX 1          PT G-Codes  Outcome Measure Options: AM-PAC 6 Clicks Daily Activity (OT)  Score: 14  Functional Limitation: Mobility: Walking and moving around  Mobility: Walking and Moving Around Current Status (): At least 40 percent but less than 60 percent impaired, limited or restricted  Mobility: Walking and Moving Around Goal Status (): At least 20 percent but less than 40 percent impaired, limited or restricted      Nicole Olson PT DPT  2018          Electronically signed by Nicole Olson PT DPT at 2018 11:39 AM           Occupational Therapy Notes (most recent note)      Sebastián Howe, OTR/L at 2018  8:50 AM  Version 1 of 1         Acute Care - Occupational Therapy Initial Evaluation  [facility]     Patient Name: aTwny Shin  : 1955  MRN: [id number]  Today's Date: 2018  Onset of Illness/Injury or Date of Surgery Date: (P) 18  Date of Referral to OT: 18  Referring Physician: (P) Dr. Oropeza    Admit Date: 2018       ICD-10-CM ICD-9-CM   1. Impaired mobility and ADLs Z74.09 799.89     Patient Active Problem List    Diagnosis   • Eustachian tube dysfunction   • Sensorineural hearing loss   • Lumbago of multiple sites in spine with sciatica   • Spondylolisthesis of lumbar region     Past Medical History:   Diagnosis Date   • Arthritis    • Asthma    • Chronic sinusitis    • Eustachian tube dysfunction    • Heart disease    • Herpes simplex    • Hypertension    • Laryngitis sicca    • Laryngitis, chronic    • MI (myocardial infarction)    • Otorrhea    • Sensorineural hearing loss    • Sjogren's disease      Past Surgical History:   Procedure Laterality Date   • A-V CARDIAC PACEMAKER INSERTION  2016   • ATRIAL CARDIAC PACEMAKER INSERTION     • CORONARY ANGIOPLASTY WITH STENT PLACEMENT      X 2; 2013 & 2014   • MYRINGOTOMY W/ TUBES  09/04/2014    TUBES NO LONGER IN PLACE          OT ASSESSMENT FLOWSHEET (last 72 hours)      OT Evaluation       01/18/18 0739 01/18/18 0738 01/17/18 1744 01/17/18 1743 01/17/18 1742    Rehab Evaluation    Document Type (P)  evaluation  -MS evaluation  -AC       Subjective Information (P)  agree to therapy;complains of;weakness;pain   L hip flex weakness  -MS agree to therapy;complains of;pain  -AC       Symptoms Noted Comment  LLE buddy frequently with WBing/ambulation  -AC       General Information    Patient Profile Review (P)  yes  -MS yes  -AC       Onset of Illness/Injury or Date of Surgery Date (P)  01/17/18  -MS 01/17/18  -AC       Referring Physician (P)  Dr. Oropeza  -MS Dr. Oropeza  -AC       General Observations (P)  pt lying in bed, awake and alert in no apparent distress  -MS lying supine in bed, no apparent distress, 3L O2 per nc  -AC       Pertinent History Of Current Problem (P)  R sided back pain, buttock and leg pain; Dx: lumbar stenosis L3-5, DDD, lumbar radiculopathy, L4-5 disc herniation w/extruded fragment, spondylolisthesis L3-5 s/p L sided LLIF via retroperitoneal approach on 1/17/18  -MS R sided back pain, buttock and leg pain; Dx: lumbar stenosis L3-5, DDD, lumbar  radiculopathy, L4-5 disc herniation w/extruded fragment, spondylolisthesis L3-5 s/p L sided LLIF via retroperitoneal approach on 1/17/18 (XR L spine)  -AC       Precautions/Limitations (P)  brace on when up;fall precautions;spinal precautions  -MS brace on when up;spinal precautions  -AC       Prior Level of Function  independent:;all household mobility;community mobility;gait;transfer;ADL's;home management;cooking;cleaning;driving;shopping  -AC       Equipment Currently Used at Home  cane, straight;walker, rolling;raised toilet  -AC  none  -AB     Plans/Goals Discussed With (P)  patient;agreed upon  -MS patient;agreed upon  -AC       Risks Reviewed (P)  patient:;LOB;nausea/vomiting;dizziness;increased discomfort  -MS patient:;LOB;nausea/vomiting;dizziness;increased discomfort;change in vital signs;increased drainage;lines disloged  -AC       Benefits Reviewed (P)  patient:;improve function;increase independence;increase strength;increase balance;decrease pain;increase knowledge  -MS patient:;improve function;increase independence;increase strength;increase balance;decrease pain;decrease risk of DVT;improve skin integrity;increase knowledge  -AC       Barriers to Rehab (P)  physical barrier  -MS none identified  -AC       Living Environment    Lives With  spouse  -AC spouse  -AB      Living Arrangements  house  -AC house  -AB      Home Accessibility  bed and bath on same level;stairs to enter home;stairs within home;other (see comments)   walk in shower  -AC no concerns  -AB      Number of Stairs to Enter Home  2  -AC       Number of Stairs Within Home  11  -AC       Stair Railings at Home  inside, present on left side  -AC none  -AB      Type of Financial/Environmental Concern   none  -AB      Transportation Available   car  -AB      Living Environment Comment  spouse available evenings  -AC       Clinical Impression    Date of Referral to OT  01/17/18  -AC       OT Diagnosis  decreased ADL  -AC       Prognosis   good  -AC       Impairments Found (describe specific impairments)  motor function;muscle performance;gait, locomotion, and balance;ergonomics and body mechanics  -       Criteria for Skilled Therapeutic Interventions Met  yes;treatment indicated  -AC       Rehab Potential  good, to achieve stated therapy goals  -AC       Therapy Frequency  3-5 times/wk  -AC       Predicted Duration of Therapy Intervention (days/wks)  until discharge  -AC       Anticipated Discharge Disposition  home with assist  -AC       Functional Level Prior    Ambulation     0-->independent  -AB    Transferring     0-->independent  -AB    Toileting     0-->independent  -AB    Bathing     0-->independent  -AB    Dressing     0-->independent  -AB    Eating     0-->independent  -AB    Communication     0-->understands/communicates without difficulty  -AB    Swallowing     0-->swallows foods/liquids without difficulty  -AB    Vital Signs    Pre Systolic BP Rehab  130  -AC       Pre Treatment Diastolic BP  66  -AC       Pre SpO2 (%) (P)  99  -MS 99  -AC       O2 Delivery Pre Treatment (P)  supplemental O2   3L  -MS supplemental O2   3L  -AC       Intra SpO2 (%) (P)  94  -MS        O2 Delivery Intra Treatment (P)  room air  -MS        Post SpO2 (%) (P)  97  -MS        O2 Delivery Post Treatment (P)  room air  -MS        Pre Patient Position (P)  Supine  -MS Supine  -AC       Intra Patient Position (P)  Sitting  -MS        Post Patient Position (P)  Sitting  -MS        Pain Assessment    Pain Assessment (P)  0-10  -MS 0-10  -AC       Pain Score (P)  4  -MS 4  -AC       Pain Type (P)  Acute pain;Surgical pain  -MS Acute pain;Surgical pain  -AC       Pain Location (P)  Back  -MS Back  -AC       Pain Orientation (P)  Left  -MS Left  -AC       Pain Radiating Towards (P)  L flank, L thigh  -MS L flank, L thigh  -AC       Pain Descriptors (P)  Aching;Throbbing  -MS Aching;Throbbing  -AC       Pain Intervention(s) (P)  Medication (See  MAR);Repositioned;Ambulation/increased activity  -MS Medication (See MAR);Repositioned;Ambulation/increased activity  -AC       Response to Interventions (P)  reports increase in pain  -MS tolerated  -AC       Cognitive Assessment/Intervention    Current Cognitive/Communication Assessment (P)  functional  -MS functional  -AC       Orientation Status (P)  oriented x 4  -MS oriented x 4  -AC       Follows Commands/Answers Questions (P)  100% of the time;able to follow single-step instructions  -% of the time;able to follow multi-step instructions  -AC       Personal Safety (P)  WNL/WFL  -MS WNL/WFL  -AC       Personal Safety Interventions (P)  fall prevention program maintained;gait belt;muscle strengthening facilitated;nonskid shoes/slippers when out of bed;supervised activity  -MS fall prevention program maintained;gait belt;nonskid shoes/slippers when out of bed;supervised activity;elopement precautions initiated  -AC       ROM (Range of Motion)    General ROM (P)  lower extremity range of motion deficits identified  -MS        General ROM Detail  WFL AROM BUE  -AC       MMT (Manual Muscle Testing)    General MMT Assessment (P)  lower extremity strength deficits identified  -MS        General MMT Assessment Detail  functionally 4+/5 BUE  -AC       Bed Mobility, Assessment/Treatment    Bed Mobility, Assistive Device (P)  bed rails  -MS bed rails  -AC       Bed Mobility, Roll Left, [place] (P)  contact guard assist;verbal cues required;nonverbal cues required (demo/gesture)  -MS        Bed Mobility, Roll Right, [place]  supervision required;verbal cues required  -AC       Bed Mob, Sidelying to Sit, [place] (P)  minimum assist (75% patient effort);verbal cues required;nonverbal cues required (demo/gesture)   min A for L LE for hip flexion  -MS contact guard assist;verbal cues required  -AC       Bed Mobility, Impairments (P)  strength decreased  -MS pain  -AC       Transfer Assessment/Treatment     Transfers, Bed-Chair [place] (P)  minimum assist (75% patient effort);2 person assist required;verbal cues required;nonverbal cues required (demo/gesture)   bed to BSC  -MS        Transfers, Chair-Bed [place] (P)  minimum assist (75% patient effort);2 person assist required;verbal cues required;nonverbal cues required (demo/gesture)   BSC to chair  -MS        Transfers, Sit-Stand [place] (P)  minimum assist (75% patient effort);2 person assist required;verbal cues required;nonverbal cues required (demo/gesture)  -MS minimum assist (75% patient effort);2 person assist required;verbal cues required  -AC       Transfers, Stand-Sit [place] (P)  minimum assist (75% patient effort);2 person assist required;verbal cues required;nonverbal cues required (demo/gesture)  -MS minimum assist (75% patient effort);verbal cues required  -AC       Transfers, Sit-Stand-Sit, Assist Device (P)  rolling walker   once HHA x2 and once with RW  -MS rolling walker  -AC       Transfer, Safety Issues (P)  knees buckling;step length decreased;weight-shifting ability decreased   L leg buckling  -MS knees buckling;other (see comments)   L knee  -AC       Transfer, Impairments (P)  ROM decreased;sensation decreased;strength decreased;impaired balance;motor control impaired;pain  -MS strength decreased  -AC       Transfer, Comment (P)  pt required blocking of her L leg to prevent buckling during her first sit to stand. She was able to use a walker for her second sit to stand without leg blocking  -MS        Functional Mobility    Functional Mobility- Ind. Level  minimum assist (75% patient effort);2 person assist required;verbal cues required  -AC       Functional Mobility- Device  rolling walker  -AC       Functional Mobility- Comment  L knee buddy with ambulation/WBing  -AC       Lower Body Dressing Assessment/Training    LB Dressing Assess/Train, Clothing Type  donning:;socks   undergarment  -AC       LB Dressing  Assess/Train, Position  sitting  -       LB Dressing Assess/Train, [place]  moderate assist (50% patient effort);verbal cues required  -AC       LB Dressing Assess/Train, Impairments  strength decreased  -AC       LB Dressing Assess/Train, Comment  impairment primarily due to L knee buckling upon standing  -       Toileting Assessment/Training    Toileting Assess/Train, Assistive Device  bedside commode  -       Toileting Assess/Train, Position  sitting;supported standing  -       Toileting Assess/Train, Indepen Level  contact guard assist  -       Toileting Assess/Train, Impairments  strength decreased  -       Toileting Assess/Train, Comment  Denis toileting, CGA for hygiene while pt completed half stand  -       Motor Skills/Interventions    Additional Documentation (P)  Balance Skills Training (Group)  -MS Balance Skills Training (Group)  -       Balance Skills Training    Sitting-Level of Assistance  Independent  -       Sitting-Balance Support  Feet supported  -       Standing-Level of Assistance  Contact guard;x2  -       Static Standing Balance Support  assistive device  -       Standing Balance # of Minutes  able to stand with CGAx2 when using rw for UE suppot  -       Gait Balance-Level of Assistance  Minimum assistance;x2  -       Gait Balance Support  assistive device  -       Orthotics Prosthetics    Additional Documentation  Orthosis Location (Group);Orthosis Management/Training (Group)  -       Orthosis Location    Orthosis Location/Type  neck/back  -       Orthosis, Neck/Back  LSO (lumbar sacral orthosis)  -       Orthosis Management/Training    Orthosis Fabrication Detail  pt already owns LSO but does not have it with her for eval, family plans to bring it this morning  -       Orthosis Skills Training  activity limitations;restrictions/precautions  -       Sensory Assessment/Intervention    Sensory Impairment  numbness   c/o numbness in L calf once up EOB   -       General Therapy Interventions    Planned Therapy Interventions  activity intolerance;adaptive equipment training;ADL retraining;balance training;bed mobility training;orthotic fitting/training;transfer training  -       Positioning and Restraints    Pre-Treatment Position  in bed  -       Post Treatment Position  chair  -       In Chair  sitting;call light within reach;encouraged to call for assist;with family/caregiver  -       General LE Assessment    ROM Detail (P)  L hip flexion impaired 75%  -MS        Lower Extremity    Lower Ext Manual Muscle Testing Detail (P)  L hip flexion 2/5  -MS          User Key  (r) = Recorded By, (t) = Taken By, (c) = Cosigned By    Initials Name Effective Dates     Sebastián Howe, OTR/L 06/22/15 -     MS Nicole Olson, PT DPT 08/02/16 -     AB Ximena Resendez, RN 08/02/16 -            Occupational Therapy Education     Title: PT OT SLP Therapies (Done)     Topic: Occupational Therapy (Done)     Point: ADL training (Done)    Description: Instruct learner(s) on proper safety adaptation and remediation techniques during self care or transfers.   Instruct in proper use of assistive devices.    Learning Progress Summary    Learner Readiness Method Response Comment Documented by Status   Patient Acceptance E VU OT POC, safe transfers, dressing weaker LE first  01/18/18 0842 Done               Point: Precautions (Done)    Description: Instruct learner(s) on prescribed precautions during self-care and functional transfers.    Learning Progress Summary    Learner Readiness Method Response Comment Documented by Status   Patient Acceptance E VU OT POC, safe transfers, dressing weaker LE first  01/18/18 0842 Done               Point: Body mechanics (Done)    Description: Instruct learner(s) on proper positioning and spine alignment during self-care, functional mobility activities and/or exercises.    Learning Progress Summary    Learner Readiness Method Response Comment  Documented by Status   Patient Acceptance E VU OT POC, safe transfers, dressing weaker LE first AC 01/18/18 0842 Done                      User Key     Initials Effective Dates Name Provider Type Discipline     06/22/15 -  Sebastián Howe, OTR/L Occupational Therapist OT                  OT Recommendation and Plan  Anticipated Discharge Disposition: home with assist  Planned Therapy Interventions: activity intolerance, adaptive equipment training, ADL retraining, balance training, bed mobility training, orthotic fitting/training, transfer training  Therapy Frequency: 3-5 times/wk  Plan of Care Review  Plan Of Care Reviewed With: patient  Progress: no change  Outcome Summary/Follow up Plan: OT eval completed.  Pt rolls L with CGA and verbal cues with use of bed rail, able to come into sitting with CGA.  ModA needed for LB dressing, CGA for hygiene after toileting using BSC.  Pt transfers with minAx2 with severe buckling of LLE while WBing and ambulating.  Pt required use of rw to support herself and decrease risk of falls.  She transferred to BSC and then to chair with minAx2.  Pt does better if she's given the opportunity to transfer toward her R.  Pt requires skilled OT to address decreased ADL function, imbalance, weakness, decreased safety and knowledge deficit related to spine surgery.  She already owns LSO however she did not currently have it available during eval.  Plans to bring it later today.  2nd stage sx also planned for 1/19/18.  Anticipate discharge home with 24/7 supervision.  Pt would benefit from shower chair and possible AE at discharge.           OT Goals       01/18/18 0843          Transfer Training OT LTG    Transfer Training OT LTG, Date Established 01/18/18  -AC      Transfer Training OT LTG, Time to Achieve by discharge  -AC      Transfer Training OT LTG, Activity Type shower chair;walk-in shower;toilet  -AC      Transfer Training OT LTG, [place] Level contact guard assist  -AC       Transfer Training OT LTG, Assist Device walker, rolling;commode, bedside;shower chair  -AC      Bathing OT LTG    Bathing Goal OT LTG, Date Established 01/18/18  -AC      Bathing Goal OT LTG, Time to Achieve by discharge  -AC      Bathing Goal OT LTG, Activity Type lower body bathing  -AC      Bathing Goal OT LTG, [place] Level supervision required  -AC      Bathing Goal OT LTG, Assist Device shower chair  -AC      LB Dressing OT LTG    LB Dressing Goal OT LTG, Date Established 01/18/18  -AC      LB Dressing Goal OT LTG, Time to Achieve by discharge  -AC      LB Dressing Goal OT LTG, [place] Level contact guard assist  -AC      LB Dressing Goal OT LTG, Additional Goal AE PRN  -AC        User Key  (r) = Recorded By, (t) = Taken By, (c) = Cosigned By    Initials Name Provider Type    TABITHA Howe OTR/L Occupational Therapist                Outcome Measures       01/18/18 0841          How much help from another is currently needed...    Putting on and taking off regular lower body clothing? 2  -AC      Bathing (including washing, rinsing, and drying) 2  -AC      Toileting (which includes using toilet bed pan or urinal) 3  -AC      Putting on and taking off regular upper body clothing 4  -AC      Taking care of personal grooming (such as brushing teeth) 3  -AC      Eating meals 4  -AC      Score 18  -AC      Functional Assessment    Outcome Measure Options AM-PAC 6 Clicks Daily Activity (OT)  -AC        User Key  (r) = Recorded By, (t) = Taken By, (c) = Cosigned By    Initials Name Provider Type    TABITHA Howe OTR/L Occupational Therapist          Time Calculation:   OT Start Time: 0738  OT Stop Time: 0835  OT Time Calculation (min): 57 min    Therapy Charges for Today     Code Description Service Date Service Provider Modifiers Qty    09142412665 HC OT SELFCARE CURRENT 1/18/2018 KATHYA Hidalgo/L GO, CK 1    41052901557 HC OT SELFCARE PROJECTED 1/18/2018 KATHYA Hidalgo/RIVAS BRICENO 1     [id number]  OT EVAL MOD COMPLEXITY 4 1/18/2018 Sebastián Howe OTR/L GO, KX 1          OT G-codes  OT Professional Judgement Used?: Yes  OT Functional Scales Options: AM-PAC 6 Clicks Daily Activity (OT)  Score: 18  Functional Limitation: Self care  Self Care Current Status (): At least 40 percent but less than 60 percent impaired, limited or restricted  Self Care Goal Status (): At least 20 percent but less than 40 percent impaired, limited or restricted    Sebastián Howe OTR/L  1/18/2018     Electronically signed by KATHYA Hidalgo/L at 1/18/2018  8:50 AM        Speech Language Pathology Notes (most recent note)     No notes of this type exist for this encounter.        Respiratory Therapy Notes (most recent note)     No notes of this type exist for this encounter.

## 2018-01-20 NOTE — PROGRESS NOTES
Orthopedic Spine Progress Note    Tawny Shin  62 y.o.  female  Subjective     Post-Operative Day # 3,1    Systemic or Specific Complaints:     Pain better controlled today, left leg pain better weakness same. Small fall when standing this am.     Objective     Vital signs in last 24 hours:  Temp:  [97.3 °F (36.3 °C)-98 °F (36.7 °C)] 98 °F (36.7 °C)  Heart Rate:  [59-74] 71  Resp:  [12-16] 16  BP: (110-168)/(47-93) 110/50    Physical Exam:    A&O x3  B.LE NVI   Abdomen soft  Dressing CDI    Diagnostic Data:  CBC:    Results from last 7 days  Lab Units 01/18/18  0447   WBC 10*3/mm3 6.43   RBC 10*6/mm3 4.01*   HEMOGLOBIN g/dL 11.9*   HEMATOCRIT % 36.0*   PLATELETS 10*3/mm3 155          Assessment/Plan     1. Status Post LLIF L3-L5 POD 3, Zee/PSF L3-L5 POD 1      Plan:  1. PT/OT today   2. Continue current pain medications   3. Knee immobilizer during ambulation   4. LSO when OOB  5. Rehab consult       VIRGIL Coy    Date: 1/20/2018  Time: 8:45 AM

## 2018-01-20 NOTE — PROGRESS NOTES
Discharge Planning Assessment  [facility]     Patient Name: Tawny Shin  MRN: [id number]  Today's Date: 1/20/2018    Admit Date: 1/17/2018          Discharge Needs Assessment       01/20/18 1011    Living Environment    Lives With spouse    Home Accessibility no concerns    Stair Railings at Home none    Type of Financial/Environmental Concern none    Transportation Available car;family or friend will provide    Living Environment    Provides Primary Care For no one    Primary Care Provided By spouse/significant other    Quality Of Family Relationships supportive    Able to Return to Prior Living Arrangements no    Discharge Needs Assessment    Concerns To Be Addressed no discharge needs identified    Readmission Within The Last 30 Days no previous admission in last 30 days    Anticipated Changes Related to Illness inability to care for self    Equipment Currently Used at Home cane, straight;walker, rolling;raised toilet    Equipment Needed After Discharge none    Discharge Facility/Level Of Care Needs nursing facility, skilled;acute rehab    Discharge Planning Comments --   MEGA has faxed referrals to [facility] and [facility].  SW will follow for bed offers.            Discharge Plan     None        Discharge Placement     No information found                Demographic Summary     None            Functional Status     None            Psychosocial     None            Abuse/Neglect     None            Legal     None            Substance Abuse     None            Patient Forms     None          KALYN Benitez

## 2018-01-20 NOTE — PLAN OF CARE
Problem: Patient Care Overview (Adult)  Goal: Plan of Care Review  Outcome: Ongoing (interventions implemented as appropriate)   01/20/18 1233   Coping/Psychosocial Response Interventions   Plan Of Care Reviewed With patient;family   Patient Care Overview   Progress improving   Outcome Evaluation   Outcome Summary/Follow up Plan OT reeval completed. Pt up in chair with LSO donned. Issued knee immobilizer for LLE and instructed pt and family on how to don/doff and proper fit. Pt transfers sit<>stand with minAx1-2. Ambulated in hallway and back to bed. MaxA needed to don socks and shoes due to L hip weakness. Pt would benefit from continued OT to address decreased LB dressing, imbalance, decreased safety and knowledge deficit. Pt would benefit from inpatient rehab at discharge andn is agreeable to this.        Problem: Inpatient Occupational Therapy  Goal: Transfer Training Goal 1 LTG- OT  Outcome: Revised   01/20/18 1233   Transfer Training OT LTG   Transfer Training OT LTG, Date Established 01/20/18   Transfer Training OT LTG, Time to Achieve by discharge   Transfer Training OT LTG, Activity Type walk-in shower;shower chair;toilet   Transfer Training OT LTG, [place] Level supervision required   Transfer Training OT LTG, Assist Device walker, rolling;commode, bedside;shower chair   Transfer Training OT LTG, Date Goal Reviewed 01/20/18   Transfer Training OT LTG, Outcome goal revised     Goal: Bathing Goal LTG- OT  Outcome: Ongoing (interventions implemented as appropriate)   01/18/18 0843 01/20/18 1233   Bathing OT LTG   Bathing Goal OT LTG, Date Established 01/18/18 --    Bathing Goal OT LTG, Time to Achieve by discharge --    Bathing Goal OT LTG, Activity Type lower body bathing --    Bathing Goal OT LTG, [place] Level supervision required --    Bathing Goal OT LTG, Assist Device shower chair --    Bathing Goal OT LTG, Date Goal Reviewed --  01/20/18   Bathing Goal OT LTG, Outcome --  goal ongoing      Goal: LB Dressing Goal LTG- OT  Outcome: Ongoing (interventions implemented as appropriate)   01/18/18 0843 01/20/18 1233   LB Dressing OT LTG   LB Dressing Goal OT LTG, Date Established 01/18/18 --    LB Dressing Goal OT LTG, Time to Achieve by discharge --    LB Dressing Goal OT LTG, [place] Level contact guard assist --    LB Dressing Goal OT LTG, Additional Goal AE PRN --    LB Dressing Goal OT LTG, Date Goal Reviewed --  01/20/18   LB Dressing Goal OT LTG, Outcome --  goal ongoing

## 2018-01-20 NOTE — PLAN OF CARE
Problem: Patient Care Overview (Adult)  Goal: Plan of Care Review  Outcome: Ongoing (interventions implemented as appropriate)    Goal: Adult Individualization and Mutuality  Outcome: Ongoing (interventions implemented as appropriate)    Goal: Discharge Needs Assessment  Outcome: Ongoing (interventions implemented as appropriate)      Problem: Laminectomy/Foraminotomy/Discectomy (Adult)  Goal: Signs and Symptoms of Listed Potential Problems Will be Absent or Manageable (Laminectomy/Foraminotomy/Discectomy)  Outcome: Ongoing (interventions implemented as appropriate)      Problem: Fall Risk (Adult)  Goal: Absence of Falls  Outcome: Ongoing (interventions implemented as appropriate)

## 2018-01-20 NOTE — THERAPY RE-EVALUATION
Acute Care - Occupational Therapy Re-Evaluation  [facility]     Patient Name: Tawny Shin  : 1955  MRN: [id number]  Today's Date: 2018  Onset of Illness/Injury or Date of Surgery Date: 18  Date of Referral to OT: 18  Referring Physician: Dr. Oropeza    Admit Date: 2018       ICD-10-CM ICD-9-CM   1. Impaired mobility and ADLs Z74.09 799.89   2. Impaired mobility Z74.09 799.89     Patient Active Problem List   Diagnosis   • Eustachian tube dysfunction   • Sensorineural hearing loss   • Lumbago of multiple sites in spine with sciatica   • Spondylolisthesis of lumbar region     Past Medical History:   Diagnosis Date   • Arthritis    • Asthma    • Chronic sinusitis    • Eustachian tube dysfunction    • Heart disease    • Herpes simplex    • Hypertension    • Laryngitis sicca    • Laryngitis, chronic    • MI (myocardial infarction)    • Otorrhea    • Sensorineural hearing loss    • Sjogren's disease      Past Surgical History:   Procedure Laterality Date   • A-V CARDIAC PACEMAKER INSERTION     • ATRIAL CARDIAC PACEMAKER INSERTION     • CORONARY ANGIOPLASTY WITH STENT PLACEMENT      X 2;  &    • MYRINGOTOMY W/ TUBES  2014    TUBES NO LONGER IN PLACE          OT ASSESSMENT FLOWSHEET (last 72 hours)      OT Evaluation       18 1036 18 1011 18 0956 18 0753 18    Rehab Evaluation    Document Type re-evaluation  -AC  --  -AC      Subjective Information agree to therapy;complains of;pain;numbness;weakness;fatigue;dizziness   L thigh numbness  -AC        General Information    Patient Profile Review yes  -AC  --  -AC      Onset of Illness/Injury or Date of Surgery Date 18  -AC  --  -AC      Referring Physician Dr. Oropeza  -AC  --  -AC      General Observations up in chair, LSO donned, family present  -AC        Pertinent History Of Current Problem s/p L4-5 laminectomy, partial facetectomy w/decompression of neural elements, removal of large  extruded disc fragment L4-5, PSF L3-5 on 1/19/18  -AC        Precautions/Limitations brace on when up;fall precautions;spinal precautions;other (see comments)   L knee buckling  -AC        Equipment Currently Used at Home  cane, straight;walker, rolling;raised toilet  -LN       Plans/Goals Discussed With patient;agreed upon  -AC        Risks Reviewed patient:;LOB;nausea/vomiting;dizziness;increased discomfort;change in vital signs;increased drainage;lines disloged  -AC        Benefits Reviewed patient:;improve function;increase independence;increase strength;increase balance;decrease pain;decrease risk of DVT;improve skin integrity;increase knowledge  -AC        Barriers to Rehab none identified  -AC        Living Environment    Lives With  spouse  -LN       Home Accessibility  no concerns  -LN       Stair Railings at Home  none  -LN       Type of Financial/Environmental Concern  none  -LN       Transportation Available  car;family or friend will provide  -LN       Clinical Impression    Date of Referral to OT 01/19/18  -AC        OT Diagnosis decreased ADL  -AC        Prognosis good  -AC        Impairments Found (describe specific impairments) muscle performance;ergonomics and body mechanics;gait, locomotion, and balance;sensory Integrity  -AC        Criteria for Skilled Therapeutic Interventions Met yes;treatment indicated  -AC        Rehab Potential good, to achieve stated therapy goals  -AC        Therapy Frequency 3-5 times/wk  -AC        Predicted Duration of Therapy Intervention (days/wks) until discharge  -AC        Anticipated Discharge Disposition inpatient rehabilitation facility  -AC        Pain Assessment    Pain Assessment 0-10  -AC        Pain Score 2  -AC        Pain Type Acute pain;Surgical pain  -AC        Pain Location Back  -AC        Pain Descriptors Aching  -AC        Pain Intervention(s) Medication (See MAR);Repositioned;Ambulation/increased activity  -AC        Response to Interventions  tolerated  -AC        Cognitive Assessment/Intervention    Current Cognitive/Communication Assessment functional  -AC        Orientation Status oriented x 4  -AC        Follows Commands/Answers Questions 100% of the time;able to follow multi-step instructions  -AC        Personal Safety WNL/WFL  -AC        Personal Safety Interventions fall prevention program maintained;gait belt;muscle strengthening facilitated;nonskid shoes/slippers when out of bed;supervised activity  -AC        ROM (Range of Motion)    General ROM Detail WFL AROM BUE  -AC        MMT (Manual Muscle Testing)    General MMT Assessment Detail functionally 4+/5 BUE  -AC        Bed Mobility, Assessment/Treatment    Bed Mob, Sit to Supine, [place] minimum assist (75% patient effort);verbal cues required  -AC        Transfer Assessment/Treatment    Transfers, Sit-Stand [place] minimum assist (75% patient effort);2 person assist required;verbal cues required  -AC        Transfers, Stand-Sit [place] minimum assist (75% patient effort);2 person assist required;verbal cues required  -AC        Transfers, Sit-Stand-Sit, Assist Device rolling walker  -AC        Transfer, Impairments strength decreased  -AC        Functional Mobility    Functional Mobility- Ind. Level contact guard assist;2 person assist required;verbal cues required  -AC        Functional Mobility- Device rolling walker  -        Functional Mobility- Comment no buckling noted with L knee immobilizer, amb in hallway and back to bed  -AC        Lower Body Dressing Assessment/Training    LB Dressing Assess/Train, Clothing Type donning:;slipper socks  -AC        LB Dressing Assess/Train, Position sitting  -AC        LB Dressing Assess/Train, [place] maximum assist (25% patient effort)  -        LB Dressing Assess/Train, Comment able to reach R foot, unable to lift L LE   -AC        Motor Skills/Interventions    Additional Documentation Balance Skills Training (Group)  -         Balance Skills Training    Sitting-Level of Assistance Distant supervision  -        Sitting-Balance Support Feet supported  -        Standing-Level of Assistance Contact guard  -        Static Standing Balance Support assistive device  -AC        Gait Balance-Level of Assistance Contact guard  -AC        Gait Balance Support assistive device  -AC        Orthotics Prosthetics    Additional Documentation Orthosis Location (Group);Orthosis Management/Training (Group)  -        Orthosis Location    Orthosis Location/Type neck/back;lower extremity  -        Orthosis, Neck/Back LSO (lumbar sacral orthosis);other (see comments)  -        Orthosis, Lower Extremity Left:;knee immobilizer  -        Orthosis Management/Training    Orthosis Indications immobilize, protect/position healing structures;rest, reduce inflammation;rest, reduce pain;restrict motion  -        Orthosis Skills Training activity limitations;restrictions/precautions  -        Orthosis Wear Schedule wear when out of bed only;remove for hygiene/bathing  -        Sensory Assessment/Intervention    Sensory Impairment --   numbness in L thigh but improved since sx  -AC        Light Touch    LLE  -AO LLE  -KV    LLE Light Touch    mild impairment  -AO mild impairment  -KV    General Therapy Interventions    Planned Therapy Interventions activity intolerance;adaptive equipment training;ADL retraining;balance training;bed mobility training;orthotic fitting/training;transfer training  -        Positioning and Restraints    Pre-Treatment Position sitting in chair/recliner  -        Post Treatment Position bed  -AC        In Bed fowlers;call light within reach;encouraged to call for assist;with family/caregiver;side rails up x2  -AC          01/19/18 1514 01/19/18 0740 01/18/18 1503 01/18/18 0800 01/18/18 0739    Rehab Evaluation    Document Type   therapy note (daily note)  -NW  evaluation  -MS    Subjective Information   agree to  therapy;complains of;weakness;pain  -NW  agree to therapy;complains of;weakness;pain   L hip flex weakness  -MS    Patient Effort, Rehab Treatment   good  -NW      General Information    Patient Profile Review     yes  -MS    Onset of Illness/Injury or Date of Surgery Date     01/17/18  -MS    Referring Physician     Dr. Oropeza  -MS    General Observations     pt lying in bed, awake and alert in no apparent distress  -MS    Pertinent History Of Current Problem     R sided back pain, buttock and leg pain; Dx: lumbar stenosis L3-5, DDD, lumbar radiculopathy, L4-5 disc herniation w/extruded fragment, spondylolisthesis L3-5 s/p L sided LLIF via retroperitoneal approach on 1/17/18  -MS    Precautions/Limitations   brace on when up;fall precautions;spinal precautions  -NW  brace on when up;fall precautions;spinal precautions   L leg buddy  -MS    Plans/Goals Discussed With     patient;agreed upon  -MS    Risks Reviewed     patient:;LOB;nausea/vomiting;dizziness;increased discomfort  -MS    Benefits Reviewed     patient:;improve function;increase independence;increase strength;increase balance;decrease pain;increase knowledge  -MS    Barriers to Rehab     physical barrier  -MS    Vital Signs    Pre SpO2 (%)     99  -MS    O2 Delivery Pre Treatment     supplemental O2   3L  -MS    Intra SpO2 (%)     94  -MS    O2 Delivery Intra Treatment     room air  -MS    Post SpO2 (%)     97  -MS    O2 Delivery Post Treatment     room air  -MS    Pre Patient Position     Supine  -MS    Intra Patient Position     Sitting  -MS    Post Patient Position     Sitting  -MS    Pain Assessment    Pain Assessment   0-10  -NW  0-10  -MS    Pain Score   5  -NW  4  -MS    Pain Type   Acute pain;Surgical pain  -NW  Acute pain;Surgical pain  -MS    Pain Location   Incision  -NW  Back  -MS    Pain Orientation   Left  -NW  Left  -MS    Pain Radiating Towards     L flank, L thigh  -MS    Pain Descriptors   Aching;Radiating;Throbbing  -NW   Aching;Throbbing  -MS    Pain Intervention(s)   Repositioned;Medication (See MAR)  -NW  Medication (See MAR);Repositioned;Ambulation/increased activity  -MS    Response to Interventions     reports increase in pain  -MS    Cognitive Assessment/Intervention    Current Cognitive/Communication Assessment     functional  -MS    Orientation Status     oriented x 4  -MS    Follows Commands/Answers Questions     100% of the time;able to follow single-step instructions  -MS    Personal Safety     WNL/WFL  -MS    Personal Safety Interventions     fall prevention program maintained;gait belt;muscle strengthening facilitated;nonskid shoes/slippers when out of bed;supervised activity  -MS    ROM (Range of Motion)    General ROM     lower extremity range of motion deficits identified  -MS    MMT (Manual Muscle Testing)    General MMT Assessment     lower extremity strength deficits identified  -MS    Bed Mobility, Assessment/Treatment    Bed Mobility, Assistive Device     bed rails  -MS    Bed Mobility, Roll Left, [place]     contact guard assist;verbal cues required;nonverbal cues required (demo/gesture)  -MS    Bed Mob, Sidelying to Sit, [place]     minimum assist (75% patient effort);verbal cues required;nonverbal cues required (demo/gesture)   min A for L LE for hip flexion  -MS    Bed Mobility, Impairments     strength decreased  -MS    Bed Mobility, Comment   up in chair  -NW      Transfer Assessment/Treatment    Transfers, Bed-Chair [place]     minimum assist (75% patient effort);2 person assist required;verbal cues required;nonverbal cues required (demo/gesture)   bed to BSC  -MS    Transfers, Chair-Bed [place]     minimum assist (75% patient effort);2 person assist required;verbal cues required;nonverbal cues required (demo/gesture)   BSC to chair  -MS    Transfers, Sit-Stand [place]   verbal cues required;contact guard assist;minimum assist (75% patient effort)  -NW  minimum assist (75% patient  effort);2 person assist required;verbal cues required;nonverbal cues required (demo/gesture)  -MS    Transfers, Stand-Sit [place]   verbal cues required;minimum assist (75% patient effort);2 person assist required  -NW  minimum assist (75% patient effort);2 person assist required;verbal cues required;nonverbal cues required (demo/gesture)  -MS    Transfers, Sit-Stand-Sit, Assist Device   rolling walker  -NW  rolling walker   once HHA x2 and once with RW  -MS    Transfer, Safety Issues   knees buckling;step length decreased;weight-shifting ability decreased  -NW  knees buckling;step length decreased;weight-shifting ability decreased   L leg buckling  -MS    Transfer, Impairments   ROM decreased;strength decreased;pain  -NW  ROM decreased;sensation decreased;strength decreased;impaired balance;motor control impaired;pain  -MS    Transfer, Comment   pt able to lock L knee herself to prevent knee from buckling during amb  -NW  pt required blocking of her L leg to prevent buckling during her first sit to stand. She was able to use a walker for her second sit to stand without leg blocking  -MS    Motor Skills/Interventions    Additional Documentation     Balance Skills Training (Group)  -MS    Balance Skills Training    Sitting-Level of Assistance     Independent  -MS    Sitting-Balance Support     Feet supported  -MS    Standing-Level of Assistance     Contact guard;x2  -MS    Static Standing Balance Support     assistive device  -MS    Standing Balance # of Minutes     CGA x2 with L knee was in full extension but min A when knee is bent  -MS    Gait Balance-Level of Assistance     Minimum assistance;x2  -MS    Gait Balance Support     assistive device  -MS    Therapy Exercises    Left Lower Extremity   AROM:;AAROM:;10 reps;hip abduction/adduction;LAQ;sitting;hip flexion  -NW      Orthotics Prosthetics    Additional Documentation   Orthosis Location (Group)  -NW  Orthosis Location (Group)  -MS    Orthosis Location  "   Orthosis Location/Type   neck/back  -NW  neck/back  -MS    Orthosis, Neck/Back   LSO (lumbar sacral orthosis)  -NW  LSO (lumbar sacral orthosis)  -MS    Orthosis Management/Training    Orthosis Fabrication Detail     pt owns LSO however it is not present at this time. Her  is bringing it today  -MS    Sensory Assessment/Intervention    Light Touch LLE  -KB LLE  -KB   LLE  -MS    LLE Light Touch mild impairment  -KB mild impairment  -KB  mild impairment  -SC mild impairment   L3 dermatome \"dull\"  -MS    Positioning and Restraints    Pre-Treatment Position   sitting in chair/recliner  -NW      Post Treatment Position   chair  -NW  chair  -MS    In Chair   sitting;call light within reach;encouraged to call for assist;with family/caregiver  -NW  sitting;call light within reach;encouraged to call for assist;with family/caregiver;notified nsg  -MS    General LE Assessment    ROM Detail     L hip flexion impaired 75%  -MS    Lower Extremity    Lower Ext Manual Muscle Testing Detail     L hip flexion 2/5  -MS      01/18/18 0738 01/17/18 1744 01/17/18 1743 01/17/18 1742       Rehab Evaluation    Document Type evaluation  -AC        Subjective Information agree to therapy;complains of;pain  -AC        Symptoms Noted Comment LLE buddy frequently with WBing/ambulation  -        General Information    Patient Profile Review yes  -AC        Onset of Illness/Injury or Date of Surgery Date 01/17/18  -        Referring Physician Dr. Oropeza  -        General Observations lying supine in bed, no apparent distress, 3L O2 per nc  -AC        Pertinent History Of Current Problem R sided back pain, buttock and leg pain; Dx: lumbar stenosis L3-5, DDD, lumbar radiculopathy, L4-5 disc herniation w/extruded fragment, spondylolisthesis L3-5 s/p L sided LLIF via retroperitoneal approach on 1/17/18 (XR L spine)  -AC        Precautions/Limitations brace on when up;spinal precautions  -AC        Prior Level of Function " independent:;all household mobility;community mobility;gait;transfer;ADL's;home management;cooking;cleaning;driving;shopping  -        Equipment Currently Used at Home cane, straight;walker, rolling;raised toilet  -AC  none  -AB      Plans/Goals Discussed With patient;agreed upon  -        Risks Reviewed patient:;LOB;nausea/vomiting;dizziness;increased discomfort;change in vital signs;increased drainage;lines disloged  -        Benefits Reviewed patient:;improve function;increase independence;increase strength;increase balance;decrease pain;decrease risk of DVT;improve skin integrity;increase knowledge  -        Barriers to Rehab none identified  -AC        Living Environment    Lives With spouse  -AC spouse  -AB       Living Arrangements house  -AC house  -AB       Home Accessibility bed and bath on same level;stairs to enter home;stairs within home;other (see comments)   walk in shower  -AC no concerns  -AB       Number of Stairs to Enter Home 2  -AC        Number of Stairs Within Home 11  -AC        Stair Railings at Home inside, present on left side  - none  -AB       Type of Financial/Environmental Concern  none  -AB       Transportation Available  car  -AB       Living Environment Comment spouse available evenings  -AC        Clinical Impression    Date of Referral to OT 01/17/18  -AC        OT Diagnosis decreased ADL  -AC        Prognosis good  -AC        Impairments Found (describe specific impairments) motor function;muscle performance;gait, locomotion, and balance;ergonomics and body mechanics  -        Criteria for Skilled Therapeutic Interventions Met yes;treatment indicated  -        Rehab Potential good, to achieve stated therapy goals  -        Therapy Frequency 3-5 times/wk  -AC        Predicted Duration of Therapy Intervention (days/wks) until discharge  -AC        Anticipated Discharge Disposition home with assist  -AC        Functional Level Prior    Ambulation    0-->independent   -AB     Transferring    0-->independent  -AB     Toileting    0-->independent  -AB     Bathing    0-->independent  -AB     Dressing    0-->independent  -AB     Eating    0-->independent  -AB     Communication    0-->understands/communicates without difficulty  -AB     Swallowing    0-->swallows foods/liquids without difficulty  -AB     Vital Signs    Pre Systolic BP Rehab 130  -AC        Pre Treatment Diastolic BP 66  -AC        Pre SpO2 (%) 99  -AC        O2 Delivery Pre Treatment supplemental O2   3L  -AC        Pre Patient Position Supine  -AC        Pain Assessment    Pain Assessment 0-10  -AC        Pain Score 4  -AC        Pain Type Acute pain;Surgical pain  -AC        Pain Location Back  -AC        Pain Orientation Left  -AC        Pain Radiating Towards L flank, L thigh  -AC        Pain Descriptors Aching;Throbbing  -AC        Pain Intervention(s) Medication (See MAR);Repositioned;Ambulation/increased activity  -AC        Response to Interventions tolerated  -AC        Cognitive Assessment/Intervention    Current Cognitive/Communication Assessment functional  -AC        Orientation Status oriented x 4  -AC        Follows Commands/Answers Questions 100% of the time;able to follow multi-step instructions  -AC        Personal Safety WNL/WFL  -AC        Personal Safety Interventions fall prevention program maintained;gait belt;nonskid shoes/slippers when out of bed;supervised activity;elopement precautions initiated  -AC        ROM (Range of Motion)    General ROM Detail WFL AROM BUE  -AC        MMT (Manual Muscle Testing)    General MMT Assessment Detail functionally 4+/5 BUE  -AC        Bed Mobility, Assessment/Treatment    Bed Mobility, Assistive Device bed rails  -AC        Bed Mobility, Roll Right, [place] supervision required;verbal cues required  -AC        Bed Mob, Sidelying to Sit, [place] contact guard assist;verbal cues required  -AC        Bed Mobility, Impairments pain  -AC        Transfer  Assessment/Treatment    Transfers, Sit-Stand [place] minimum assist (75% patient effort);2 person assist required;verbal cues required  -AC        Transfers, Stand-Sit [place] minimum assist (75% patient effort);verbal cues required  -AC        Transfers, Sit-Stand-Sit, Assist Device rolling walker  -AC        Transfer, Safety Issues knees buckling;other (see comments)   L knee  -AC        Transfer, Impairments strength decreased  -AC        Functional Mobility    Functional Mobility- Ind. Level minimum assist (75% patient effort);2 person assist required;verbal cues required  -AC        Functional Mobility- Device rolling walker  -AC        Functional Mobility- Comment L knee buddy with ambulation/WBing  -AC        Lower Body Dressing Assessment/Training    LB Dressing Assess/Train, Clothing Type donning:;socks   undergarment  -AC        LB Dressing Assess/Train, Position sitting  -AC        LB Dressing Assess/Train, [place] moderate assist (50% patient effort);verbal cues required  -AC        LB Dressing Assess/Train, Impairments strength decreased  -AC        LB Dressing Assess/Train, Comment impairment primarily due to L knee buckling upon standing  -AC        Toileting Assessment/Training    Toileting Assess/Train, Assistive Device bedside commode  -AC        Toileting Assess/Train, Position sitting;supported standing  -AC        Toileting Assess/Train, Indepen Level contact guard assist  -AC        Toileting Assess/Train, Impairments strength decreased  -AC        Toileting Assess/Train, Comment [place] toileting, CGA for hygiene while pt completed half stand  -AC        Motor Skills/Interventions    Additional Documentation Balance Skills Training (Group)  -AC        Balance Skills Training    Sitting-Level of Assistance Independent  -AC        Sitting-Balance Support Feet supported  -AC        Standing-Level of Assistance Contact guard;x2  -AC        Static Standing Balance Support assistive  device  -        Standing Balance # of Minutes able to stand with CGAx2 when using rw for UE suppot  -AC        Gait Balance-Level of Assistance Minimum assistance;x2  -AC        Gait Balance Support assistive device  -        Orthotics Prosthetics    Additional Documentation Orthosis Location (Group);Orthosis Management/Training (Group)  -        Orthosis Location    Orthosis Location/Type neck/back  -        Orthosis, Neck/Back LSO (lumbar sacral orthosis)  -        Orthosis Management/Training    Orthosis Fabrication Detail pt already owns LSO but does not have it with her for eval, family plans to bring it this morning  -        Orthosis Skills Training activity limitations;restrictions/precautions  -        Sensory Assessment/Intervention    Sensory Impairment numbness   c/o numbness in L calf once up EOB  -        General Therapy Interventions    Planned Therapy Interventions activity intolerance;adaptive equipment training;ADL retraining;balance training;bed mobility training;orthotic fitting/training;transfer training  -        Positioning and Restraints    Pre-Treatment Position in bed  -        Post Treatment Position chair  -        In Chair sitting;call light within reach;encouraged to call for assist;with family/caregiver  -          User Key  (r) = Recorded By, (t) = Taken By, (c) = Cosigned By    Initials Name Effective Dates    AC Sebastián Howe, OTR/L 06/22/15 -     MS Nicole Olson, PT DPT 08/02/16 -     NW Angle Nagel, PTA 08/02/16 -     KB Mary Mujica, SARAH 08/02/16 -     AB Ximena Resendez, SARAH 08/02/16 -     AO Kellee Matos RN 08/02/16 -     SC Yadi Whitmore RN 09/02/16 -     KV Evelyn Barnhart RN 11/02/16 -     LN KALYN Benitez 09/15/16 -            Occupational Therapy Education     Title: PT OT SLP Therapies (Active)     Topic: Occupational Therapy (Done)     Point: ADL training (Done)    Description: Instruct learner(s) on proper safety adaptation  and remediation techniques during self care or transfers.   Instruct in proper use of assistive devices.    Learning Progress Summary    Learner Readiness Method Response Comment Documented by Status   Patient Acceptance E VU,DU,NR OT POC, knee immobilizer, benefits of activity AC 01/20/18 1134 Done    Acceptance E VU OT POC, safe transfers, dressing weaker LE first AC 01/18/18 0842 Done   Family Acceptance E VU,DU,NR OT POC, knee immobilizer, benefits of activity AC 01/20/18 1134 Done               Point: Precautions (Done)    Description: Instruct learner(s) on prescribed precautions during self-care and functional transfers.    Learning Progress Summary    Learner Readiness Method Response Comment Documented by Status   Patient Acceptance E VU,DU,NR OT POC, knee immobilizer, benefits of activity AC 01/20/18 1134 Done    Acceptance E VU OT POC, safe transfers, dressing weaker LE first AC 01/18/18 0842 Done   Family Acceptance E VU,DU,NR OT POC, knee immobilizer, benefits of activity AC 01/20/18 1134 Done               Point: Body mechanics (Done)    Description: Instruct learner(s) on proper positioning and spine alignment during self-care, functional mobility activities and/or exercises.    Learning Progress Summary    Learner Readiness Method Response Comment Documented by Status   Patient Acceptance E VU,DU,NR OT POC, knee immobilizer, benefits of activity AC 01/20/18 1134 Done    Acceptance E VU OT POC, safe transfers, dressing weaker LE first AC 01/18/18 0842 Done   Family Acceptance E VU,DU,NR OT POC, knee immobilizer, benefits of activity AC 01/20/18 1134 Done                      User Key     Initials Effective Dates Name Provider Type Discipline     06/22/15 -  Sebastián Howe OTR/L Occupational Therapist OT                  OT Recommendation and Plan  Anticipated Discharge Disposition: inpatient rehabilitation facility  Planned Therapy Interventions: activity intolerance, adaptive equipment training,  ADL retraining, balance training, bed mobility training, orthotic fitting/training, transfer training  Therapy Frequency: 3-5 times/wk  Plan of Care Review  Plan Of Care Reviewed With: patient, family  Progress: improving  Outcome Summary/Follow up Plan: OT reeval completed.  Pt up in chair with LSO donned.  Issued knee immobilizer for LLE and instructed pt and family on how to don/doff and proper fit.  Pt transfers sit<>stand with minAx1-2.  Ambulated in hallway and back to bed.  MaxA needed to don socks and shoes due to L hip weakness.  Pt would benefit from continued OT to address decreased LB dressing, imbalance, decreased safety and knowledge deficit.  Pt would benefit from inpatient rehab at discharge andn is agreeable to this.           OT Goals       01/20/18 1233 01/18/18 0843       Transfer Training OT LTG    Transfer Training OT LTG, Date Established 01/20/18  -AC 01/18/18  -AC     Transfer Training OT LTG, Time to Achieve by discharge  -AC by discharge  -AC     Transfer Training OT LTG, Activity Type walk-in shower;shower chair;toilet  -AC shower chair;walk-in shower;toilet  -AC     Transfer Training OT LTG, [place] Level supervision required  -AC contact guard assist  -AC     Transfer Training OT LTG, Assist Device walker, rolling;commode, bedside;shower chair  -AC walker, rolling;commode, bedside;shower chair  -AC     Transfer Training OT LTG, Date Goal Reviewed 01/20/18  -AC      Transfer Training OT LTG, Outcome goal revised  -AC      Bathing OT LTG    Bathing Goal OT LTG, Date Established  01/18/18  -AC     Bathing Goal OT LTG, Time to Achieve  by discharge  -AC     Bathing Goal OT LTG, Activity Type  lower body bathing  -AC     Bathing Goal OT LTG, [place] Level  supervision required  -AC     Bathing Goal OT LTG, Assist Device  shower chair  -AC     Bathing Goal OT LTG, Date Goal Reviewed 01/20/18  -AC      Bathing Goal OT LTG, Outcome goal ongoing  -AC      LB Dressing OT LTG    LB  Dressing Goal OT LTG, Date Established  01/18/18  -AC     LB Dressing Goal OT LTG, Time to Achieve  by discharge  -AC     LB Dressing Goal OT LTG, [place] Level  contact guard assist  -AC     LB Dressing Goal OT LTG, Additional Goal  AE PRN  -AC     LB Dressing Goal OT LTG, Date Goal Reviewed 01/20/18  -AC      LB Dressing Goal OT LTG, Outcome goal ongoing  -AC        User Key  (r) = Recorded By, (t) = Taken By, (c) = Cosigned By    Initials Name Provider Type    TABITHA Howe, OTR/L Occupational Therapist                Outcome Measures       01/20/18 1115 01/18/18 0841 01/18/18 0739    How much help from another person do you currently need...    Turning from your back to your side while in flat bed without using bedrails?   3  -MS    Moving from lying on back to sitting on the side of a flat bed without bedrails?   3  -MS    Moving to and from a bed to a chair (including a wheelchair)?   3  -MS    Standing up from a chair using your arms (e.g., wheelchair, bedside chair)?   3  -MS    Climbing 3-5 steps with a railing?   1  -MS    To walk in hospital room?   1  -MS    AM-PAC 6 Clicks Score   14  -MS    How much help from another is currently needed...    Putting on and taking off regular lower body clothing? 2  -AC 2  -AC     Bathing (including washing, rinsing, and drying) 2  -AC 2  -AC     Toileting (which includes using toilet bed pan or urinal) 2  -AC 3  -AC     Putting on and taking off regular upper body clothing 3  -AC 4  -AC     Taking care of personal grooming (such as brushing teeth) 3  -AC 3  -AC     Eating meals 4  -AC 4  -AC     Score 16  -AC 18  -AC     Functional Assessment    Outcome Measure Options AM-PAC 6 Clicks Daily Activity (OT)  -AC AM-PAC 6 Clicks Daily Activity (OT)  -AC AM-PAC 6 Clicks Basic Mobility (PT)  -MS      User Key  (r) = Recorded By, (t) = Taken By, (c) = Cosigned By    Initials Name Provider Type    TABITHA Howe, OTR/L Occupational Therapist    MS Nicole MCGEE  [place]es[place], PT DPT Physical Therapist          Time Calculation:   OT Start Time: 1036  OT Stop Time: 1130  OT Time Calculation (min): 54 min    Therapy Charges for Today     Code Description Service Date Service Provider Modifiers Qty    71080094389 HC OT SELFCARE CURRENT 1/20/2018 Sebastián Howe, OTR/L GO, CK 1    41380992815 HC OT SELFCARE PROJECTED 1/20/2018 Sebastián Howe OTR/L GO, CJ 1    54681327334 HC OT RE-EVAL 2 1/20/2018 Sebastián Howe OTR/L GO, KX 1    02747656419 HC OT SELF CARE/MGMT/TRAIN EA 15 MIN 1/20/2018 Sebastián Howe OTR/L GO, KX 2          OT G-codes  OT Professional Judgement Used?: Yes  OT Functional Scales Options: AM-PAC 6 Clicks Daily Activity (OT)  Score: 16  Functional Limitation: Self care  Self Care Current Status (): At least 40 percent but less than 60 percent impaired, limited or restricted  Self Care Goal Status (): At least 20 percent but less than 40 percent impaired, limited or restricted    Sebastián Howe, OTR/L  1/20/2018

## 2018-01-20 NOTE — PLAN OF CARE
Problem: Patient Care Overview (Adult)  Goal: Plan of Care Review  Outcome: Ongoing (interventions implemented as appropriate)   01/20/18 1302   Coping/Psychosocial Response Interventions   Plan Of Care Reviewed With patient;family   Outcome Evaluation   Outcome Summary/Follow up Plan Physical thrapy re-evaluation completed. Pt up in chair withKANDYO etienne. Issued and fitted knee immobilizer for LLE and educated on wear. Pt performed transfers with min A 1-2. Ambulated short distance in hallway with CGA, but unable to clear LLE. Pt would benefit from continued therapy in order to improve transfers, gait and LE strength. Pt would benefit from inpatient rehab at discharge. .       Problem: Inpatient Physical Therapy  Goal: Bed Mobility Goal LTG- PT   01/18/18 1133 01/20/18 1302   Bed Mobility PT LTG   Bed Mobility PT LTG, Date Established --  01/20/18   Bed Mobility PT LTG, Time to Achieve by discharge --    Bed Mobility PT LTG, Activity Type all bed mobility --    Bed Mobility PT LTG, [place] Level independent --      Goal: Transfer Training Goal 1 LTG- PT  Outcome: Ongoing (interventions implemented as appropriate)   01/18/18 1133 01/20/18 1302   Transfer Training PT LTG   Transfer Training PT LTG, Date Established --  01/20/18   Transfer Training PT LTG, Time to Achieve by discharge --    Transfer Training PT LTG, Activity Type sit to stand/stand to sit --    Transfer Training PT LTG, [place] Level conditional independence --    Transfer Training PT LTG, Assist Device walker, rolling --      Goal: Gait Training Goal LTG- PT  Outcome: Ongoing (interventions implemented as appropriate)   01/18/18 1133 01/20/18 1302   Gait Training PT LTG   Gait Training Goal PT LTG, Date Established --  01/20/18   Gait Training Goal PT LTG, Time to Achieve by discharge --    Gait Training Goal PT LTG, [place] Level contact guard assist --    Gait Training Goal PT LTG, Assist Device walker, rolling --    Gait Training  Goal PT LTG, Distance to Achieve 50ft clearing L foot 50% of the time --      Goal: Stair Training Goal LTG- PT  Outcome: Ongoing (interventions implemented as appropriate)   01/18/18 1133 01/20/18 1302   Stair Training PT LTG   Stair Training Goal PT LTG, Date Established --  01/20/18   Stair Training Goal PT LTG, Time to Achieve by discharge --    Stair Training Goal PT LTG, Number of Steps 3 --    Stair Training Goal PT LTG, [place] Level contact guard assist --    Stair Training Goal PT LTG, Assist Device 1 handrail --      Goal: Strength Goal LTG- PT   01/18/18 1133 01/20/18 1302   Strength Goal PT LTG   Strength Goal PT LTG, Date Established --  01/20/18   Strength Goal PT LTG, Time to Achieve by discharge --    Strength Goal PT LTG, Functional Goal pt will demonstrate full AROM of left hip flexion --

## 2018-01-20 NOTE — THERAPY RE-EVALUATION
Acute Care - Physical Therapy Re-Evaluation  [facility]     Patient Name: Tawny Shin  : 1955  MRN: [id number]  Today's Date: 2018   Onset of Illness/Injury or Date of Surgery Date: 18  Date of Referral to PT: 18  Referring Physician: Dr. Oropeza      Admit Date: 2018     Visit Dx:    ICD-10-CM ICD-9-CM   1. Impaired mobility and ADLs Z74. 799.89   2. Impaired mobility Z74.09 799.89     Patient Active Problem List   Diagnosis   • Eustachian tube dysfunction   • Sensorineural hearing loss   • Lumbago of multiple sites in spine with sciatica   • Spondylolisthesis of lumbar region     Past Medical History:   Diagnosis Date   • Arthritis    • Asthma    • Chronic sinusitis    • Eustachian tube dysfunction    • Heart disease    • Herpes simplex    • Hypertension    • Laryngitis sicca    • Laryngitis, chronic    • MI (myocardial infarction)    • Otorrhea    • Sensorineural hearing loss    • Sjogren's disease      Past Surgical History:   Procedure Laterality Date   • A-V CARDIAC PACEMAKER INSERTION     • ATRIAL CARDIAC PACEMAKER INSERTION     • CORONARY ANGIOPLASTY WITH STENT PLACEMENT      X 2;  &    • MYRINGOTOMY W/ TUBES  2014    TUBES NO LONGER IN PLACE          PT ASSESSMENT (last 72 hours)      PT Evaluation       18 1036 18 1030    Rehab Evaluation    Document Type re-evaluation  -AC re-evaluation   see MAR  -KR    Subjective Information agree to therapy;complains of;pain;numbness;weakness;fatigue;dizziness   L thigh numbness  -AC agree to therapy;complains of;pain;numbness;weakness;dizziness   L thigh numbness  -KR    Patient Effort, Rehab Treatment  good  -KR    Symptoms Noted Comment  LLE buckling with WBing  -KR    General Information    Patient Profile Review yes  -AC yes  -KR    Onset of Illness/Injury or Date of Surgery Date 18  -AC 18  -KR    Referring Physician Dr. Oropeza  -AC Dr. Choi   -KR    General Observations up in chair, LSO  donned, family present  -AC up in chair, LSO donned, family present  -KR    Pertinent History Of Current Problem s/p L4-5 laminectomy, partial facetectomy w/decompression of neural elements, removal of large extruded disc fragment L4-5, PSF L3-5 on 1/19/18  -AC s/p L4-5 laminectomy, partial facetectomy w/decompression of neural elements, removal of large extruded disc fragment L4-5, PSF L3-5 on 1/19/18  -KR    Precautions/Limitations brace on when up;fall precautions;spinal precautions;other (see comments)   L knee buckling  -AC brace on when up;fall precautions;spinal precautions;other (see comments)   L knee buckling  -KR    Plans/Goals Discussed With patient;agreed upon  -AC patient;agreed upon  -KR    Risks Reviewed patient:;LOB;nausea/vomiting;dizziness;increased discomfort;change in vital signs;increased drainage;lines disloged  -AC patient:;LOB;nausea/vomiting;dizziness;increased discomfort;change in vital signs;increased drainage;lines disloged  -KR    Benefits Reviewed patient:;improve function;increase independence;increase strength;increase balance;decrease pain;decrease risk of DVT;improve skin integrity;increase knowledge  -AC patient:;improve function;increase independence;increase strength;increase balance;decrease pain;decrease risk of DVT;improve skin integrity;increase knowledge  -KR    Barriers to Rehab none identified  -AC none identified  -KR    Clinical Impression    Date of Referral to PT  01/19/18  -KR    Patient/Family Goals Statement  walk and move left leg  -KR    Criteria for Skilled Therapeutic Interventions Met  yes;treatment indicated  -KR    Pathology/Pathophysiology Noted (Describe Specifically for Each System)  musculoskeletal  -KR    Impairments Found (describe specific impairments)  gait, locomotion, and balance;ROM;muscle performance;sensory Integrity  -KR    Rehab Potential  good, to achieve stated therapy goals  -KR    Predicted Duration of Therapy Intervention (days/wks)   until discharge  -KR    Pain Assessment    Pain Assessment 0-10  -AC 0-10  -KR    Pain Score 2  -AC 2  -KR    Pain Type Acute pain;Surgical pain  -AC Acute pain;Neuropathic pain  -KR    Pain Location Back  -AC Back  -KR    Pain Orientation  Left  -KR    Pain Radiating Towards  L flank, L thigh  -KR    Pain Descriptors Aching  -AC Aching  -KR    Pain Intervention(s) Medication (See MAR);Repositioned;Ambulation/increased activity  -AC Medication (See MAR);Repositioned;Ambulation/increased activity  -KR    Response to Interventions tolerated  -AC tolerated  -KR    Cognitive Assessment/Intervention    Current Cognitive/Communication Assessment functional  -AC functional  -KR    Orientation Status oriented x 4  -AC oriented x 4  -KR    Follows Commands/Answers Questions 100% of the time;able to follow multi-step instructions  -% of the time;able to follow multi-step instructions  -KR    Personal Safety WNL/WFL  -AC WNL/WFL  -KR    Personal Safety Interventions fall prevention program maintained;gait belt;muscle strengthening facilitated;nonskid shoes/slippers when out of bed;supervised activity  -AC gait belt;fall prevention program maintained;muscle strengthening facilitated;nonskid shoes/slippers when out of bed;supervised activity   L Knee immobolizer  -KR    ROM (Range of Motion)    General ROM Detail WFL AROM BUE  -AC RLE WFL; LLE: PROM WFL; limited 50-75% with hip flexion and knee flexion  -KR    MMT (Manual Muscle Testing)    General MMT Assessment Detail functionally 4+/5 BUE  -AC     Lower Extremity    Lower Ext Manual Muscle Testing Detail  L hip and knee flexion 2/5   -KR    Bed Mobility, Assessment/Treatment    Bed Mob, Sit to Supine, [place] minimum assist (75% patient effort);verbal cues required  -AC minimum assist (75% patient effort);verbal cues required;nonverbal cues required (demo/gesture)  -KR    Transfer Assessment/Treatment    Transfers, Sit-Stand [place] minimum assist (75% patient  effort);2 person assist required;verbal cues required  -AC minimum assist (75% patient effort);2 person assist required;verbal cues required;nonverbal cues required (demo/gesture)  -KR    Transfers, Stand-Sit [place] minimum assist (75% patient effort);2 person assist required;verbal cues required  -AC minimum assist (75% patient effort);verbal cues required;nonverbal cues required (demo/gesture);2 person assist required  -KR    Transfers, Sit-Stand-Sit, Assist Device rolling walker  -AC rolling walker  -KR    Transfer, Safety Issues  knees buckling;weight-shifting ability decreased;step length decreased  -KR    Transfer, Impairments strength decreased  -AC strength decreased  -KR    Gait Assessment/Treatment    Gait, [place] Level  contact guard assist;1 person + 1 person to manage equipment  -KR    Gait, Assistive Device  rolling walker  -KR    Gait, Distance (Feet)  --   10';sitting rest break to adjust knee immobilizer, then 20'  -KR    Gait, Gait Deviations  left:;knee buckling;step length decreased;weight-shifting ability decreased  -KR    Gait, Safety Issues  step length decreased;weight-shifting ability decreased  -KR    Gait, Impairments  strength decreased  -KR    Gait, Comment  L hip flexor weakness; unable to clear LLE; cues to assist hip flexion and not circumdct/hip hiike  -KR    Motor Skills/Interventions    Additional Documentation Balance Skills Training (Group)  -AC Balance Skills Training (Group)  -KR    Balance Skills Training    Sitting-Level of Assistance Distant supervision  -AC Distant supervision  -KR    Sitting-Balance Support Feet supported  -AC Feet supported  -KR    Standing-Level of Assistance Contact guard  -AC Contact guard  -KR    Static Standing Balance Support assistive device  -AC assistive device  -KR    Gait Balance-Level of Assistance Contact guard  -AC Contact guard  -KR    Gait Balance Support assistive device  -AC assistive device  -KR    Therapy Exercises    Left  Lower Extremity  AAROM:;10 reps;hip flexion   knee extension  -KR    Orthotics Prosthetics    Additional Documentation Orthosis Location (Group);Orthosis Management/Training (Group)  -AC Orthosis Location (Group)  -KR    Orthosis Location    Orthosis Location/Type neck/back;lower extremity  -AC neck/back  -KR    Orthosis, Neck/Back LSO (lumbar sacral orthosis);other (see comments)  -AC LSO (lumbar sacral orthosis)  -KR    Orthosis, Lower Extremity Left:;knee immobilizer  -AC Left:;knee immobilizer  -KR    Orthosis Management/Training    Orthosis Indications immobilize, protect/position healing structures;rest, reduce inflammation;rest, reduce pain;restrict motion  -AC immobilize, protect/position healing structures;rest, reduce inflammation;rest, reduce pain;restrict motion  -KR    Orthosis Skills Training activity limitations;restrictions/precautions  - activity limitations;restrictions/precautions  -KR    Orthosis Wear Schedule wear when out of bed only;remove for hygiene/bathing  -AC wear when out of bed only;remove for hygiene/bathing  -KR    Sensory Assessment/Intervention    Sensory Impairment --   numbness in L thigh but improved since sx  -AC --   numbnes in thigh; improved since surgery  -KR    Positioning and Restraints    Pre-Treatment Position sitting in chair/recliner  -AC sitting in chair/recliner  -KR    Post Treatment Position bed  -AC bed  -KR    In Bed fowlers;call light within reach;encouraged to call for assist;with family/caregiver;side rails up x2  -AC fowlers;call light within reach;encouraged to call for assist;with family/caregiver;side rails up x2  -KR      01/20/18 1011 01/20/18 0956    Rehab Evaluation    Document Type  --  -    General Information    Patient Profile Review  --  -AC    Onset of Illness/Injury or Date of Surgery Date  --  -    Referring Physician  --  -AC    Equipment Currently Used at Home cane, straight;walker, rolling;raised toilet  -LN     Living Environment     Lives With spouse  -LN     Home Accessibility no concerns  -LN     Stair Railings at Home none  -LN     Type of Financial/Environmental Concern none  -LN     Transportation Available car;family or friend will provide  -LN       01/20/18 0753 01/19/18 2000    Sensory Assessment/Intervention    Light Touch LLE  -AO LLE  -KV    LLE Light Touch mild impairment  -AO mild impairment  -KV      01/19/18 1514 01/19/18 0740    Sensory Assessment/Intervention    Light Touch LLE  -KB LLE  -KB    LLE Light Touch mild impairment  -KB mild impairment  -KB      01/18/18 1503 01/18/18 0800    Rehab Evaluation    Document Type therapy note (daily note)  -NW     Subjective Information agree to therapy;complains of;weakness;pain  -NW     Patient Effort, Rehab Treatment good  -NW     General Information    Precautions/Limitations brace on when up;fall precautions;spinal precautions  -NW     Pain Assessment    Pain Assessment 0-10  -NW     Pain Score 5  -NW     Pain Type Acute pain;Surgical pain  -NW     Pain Location Incision  -NW     Pain Orientation Left  -NW     Pain Descriptors Aching;Radiating;Throbbing  -NW     Pain Intervention(s) Repositioned;Medication (See MAR)  -NW     Bed Mobility, Assessment/Treatment    Bed Mobility, Comment up in chair  -NW     Transfer Assessment/Treatment    Transfers, Sit-Stand [place] verbal cues required;contact guard assist;minimum assist (75% patient effort)  -NW     Transfers, Stand-Sit [place] verbal cues required;minimum assist (75% patient effort);2 person assist required  -NW     Transfers, Sit-Stand-Sit, Assist Device rolling walker  -NW     Transfer, Safety Issues knees buckling;step length decreased;weight-shifting ability decreased  -NW     Transfer, Impairments ROM decreased;strength decreased;pain  -NW     Transfer, Comment pt able to lock L knee herself to prevent knee from buckling during amb  -NW     Gait Assessment/Treatment    Gait, [place] Level contact guard  assist;1 person + 1 person to manage equipment  -NW     Gait, Assistive Device rolling walker  -NW     Gait, Distance (Feet) 10   10x2  -NW     Gait, Gait Deviations rush decreased;step length decreased;stride length decreased;left:;knee buckling  -NW     Gait, Safety Issues step length decreased;weight-shifting ability decreased  -NW     Gait, Impairments pain;strength decreased;sensation decreased  -NW     Therapy Exercises    Left Lower Extremity AROM:;AAROM:;10 reps;hip abduction/adduction;LAQ;sitting;hip flexion  -NW     Orthotics Prosthetics    Additional Documentation Orthosis Location (Group)  -NW     Orthosis Location    Orthosis Location/Type neck/back  -NW     Orthosis, Neck/Back LSO (lumbar sacral orthosis)  -NW     Sensory Assessment/Intervention    LLE Light Touch  mild impairment  -SC    Positioning and Restraints    Pre-Treatment Position sitting in chair/recliner  -NW     Post Treatment Position chair  -NW     In Chair sitting;call light within reach;encouraged to call for assist;with family/caregiver  -       01/18/18 0739 01/18/18 0738    Rehab Evaluation    Document Type evaluation  -MS evaluation  -AC    Subjective Information agree to therapy;complains of;weakness;pain   L hip flex weakness  -MS agree to therapy;complains of;pain  -AC    Symptoms Noted Comment  LLE buddy frequently with WBing/ambulation  -AC    General Information    Patient Profile Review yes  -MS yes  -AC    Onset of Illness/Injury or Date of Surgery Date 01/17/18  -MS 01/17/18  -AC    Referring Physician Dr. Oropeza  -MS Dr. Oropeza  -AC    General Observations pt lying in bed, awake and alert in no apparent distress  -MS lying supine in bed, no apparent distress, 3L O2 per nc  -AC    Pertinent History Of Current Problem R sided back pain, buttock and leg pain; Dx: lumbar stenosis L3-5, DDD, lumbar radiculopathy, L4-5 disc herniation w/extruded fragment, spondylolisthesis L3-5 s/p L sided LLIF via retroperitoneal approach  on 1/17/18  -MS R sided back pain, buttock and leg pain; Dx: lumbar stenosis L3-5, DDD, lumbar radiculopathy, L4-5 disc herniation w/extruded fragment, spondylolisthesis L3-5 s/p L sided LLIF via retroperitoneal approach on 1/17/18 (XR L spine)  -AC    Precautions/Limitations brace on when up;fall precautions;spinal precautions   L leg buddy  -MS brace on when up;spinal precautions  -AC    Prior Level of Function  independent:;all household mobility;community mobility;gait;transfer;ADL's;home management;cooking;cleaning;driving;shopping  -AC    Equipment Currently Used at Home  cane, straight;walker, rolling;raised toilet  -AC    Plans/Goals Discussed With patient;agreed upon  -MS patient;agreed upon  -AC    Risks Reviewed patient:;LOB;nausea/vomiting;dizziness;increased discomfort  -MS patient:;LOB;nausea/vomiting;dizziness;increased discomfort;change in vital signs;increased drainage;lines disloged  -AC    Benefits Reviewed patient:;improve function;increase independence;increase strength;increase balance;decrease pain;increase knowledge  -MS patient:;improve function;increase independence;increase strength;increase balance;decrease pain;decrease risk of DVT;improve skin integrity;increase knowledge  -AC    Barriers to Rehab physical barrier  -MS none identified  -AC    Living Environment    Lives With  spouse  -AC    Living Arrangements  house  -AC    Home Accessibility  bed and bath on same level;stairs to enter home;stairs within home;other (see comments)   walk in shower  -AC    Number of Stairs to Enter Home  2  -AC    Number of Stairs Within Home  11  -AC    Stair Railings at Home  inside, present on left side  -AC    Living Environment Comment  spouse available evenings  -AC    Clinical Impression    Date of Referral to PT 01/17/18  -MS     Patient/Family Goals Statement walk and move left leg  -MS     Criteria for Skilled Therapeutic Interventions Met yes;treatment indicated  -MS      Pathology/Pathophysiology Noted (Describe Specifically for Each System) musculoskeletal  -MS     Impairments Found (describe specific impairments) gait, locomotion, and balance;muscle performance;ROM;sensory Integrity  -MS     Rehab Potential good, to achieve stated therapy goals  -MS     Predicted Duration of Therapy Intervention (days/wks) until discharge  -MS     Vital Signs    Pre Systolic BP Rehab  130  -AC    Pre Treatment Diastolic BP  66  -AC    Pre SpO2 (%) 99  -MS 99  -AC    O2 Delivery Pre Treatment supplemental O2   3L  -MS supplemental O2   3L  -AC    Intra SpO2 (%) 94  -MS     O2 Delivery Intra Treatment room air  -MS     Post SpO2 (%) 97  -MS     O2 Delivery Post Treatment room air  -MS     Pre Patient Position Supine  -MS Supine  -AC    Intra Patient Position Sitting  -MS     Post Patient Position Sitting  -MS     Pain Assessment    Pain Assessment 0-10  -MS 0-10  -AC    Pain Score 4  -MS 4  -AC    Pain Type Acute pain;Surgical pain  -MS Acute pain;Surgical pain  -AC    Pain Location Back  -MS Back  -AC    Pain Orientation Left  -MS Left  -AC    Pain Radiating Towards L flank, L thigh  -MS L flank, L thigh  -AC    Pain Descriptors Aching;Throbbing  -MS Aching;Throbbing  -AC    Pain Intervention(s) Medication (See MAR);Repositioned;Ambulation/increased activity  -MS Medication (See MAR);Repositioned;Ambulation/increased activity  -AC    Response to Interventions reports increase in pain  -MS tolerated  -AC    Cognitive Assessment/Intervention    Current Cognitive/Communication Assessment functional  -MS functional  -AC    Orientation Status oriented x 4  -MS oriented x 4  -AC    Follows Commands/Answers Questions 100% of the time;able to follow single-step instructions  -% of the time;able to follow multi-step instructions  -AC    Personal Safety WNL/WFL  -MS WNL/WFL  -AC    Personal Safety Interventions fall prevention program maintained;gait belt;muscle strengthening facilitated;nonskid  shoes/slippers when out of bed;supervised activity  -MS fall prevention program maintained;gait belt;nonskid shoes/slippers when out of bed;supervised activity;elopement precautions initiated  -AC    ROM (Range of Motion)    General ROM lower extremity range of motion deficits identified  -MS     General ROM Detail  WFL AROM BUE  -AC    General LE Assessment    ROM Detail L hip flexion impaired 75%  -MS     MMT (Manual Muscle Testing)    General MMT Assessment lower extremity strength deficits identified  -MS     General MMT Assessment Detail  functionally 4+/5 BUE  -AC    Lower Extremity    Lower Ext Manual Muscle Testing Detail L hip flexion 2/5  -MS     Bed Mobility, Assessment/Treatment    Bed Mobility, Assistive Device bed rails  -MS bed rails  -AC    Bed Mobility, Roll Left, [place] contact guard assist;verbal cues required;nonverbal cues required (demo/gesture)  -MS     Bed Mobility, Roll Right, [place]  supervision required;verbal cues required  -AC    Bed Mob, Sidelying to Sit, [place] minimum assist (75% patient effort);verbal cues required;nonverbal cues required (demo/gesture)   min A for L LE for hip flexion  -MS contact guard assist;verbal cues required  -AC    Bed Mobility, Impairments strength decreased  -MS pain  -AC    Transfer Assessment/Treatment    Transfers, Bed-Chair [place] minimum assist (75% patient effort);2 person assist required;verbal cues required;nonverbal cues required (demo/gesture)   bed to BSC  -MS     Transfers, Chair-Bed [place] minimum assist (75% patient effort);2 person assist required;verbal cues required;nonverbal cues required (demo/gesture)   BSC to chair  -MS     Transfers, Sit-Stand [place] minimum assist (75% patient effort);2 person assist required;verbal cues required;nonverbal cues required (demo/gesture)  -MS minimum assist (75% patient effort);2 person assist required;verbal cues required  -AC    Transfers, Stand-Sit [place]  minimum assist (75% patient effort);2 person assist required;verbal cues required;nonverbal cues required (demo/gesture)  -MS minimum assist (75% patient effort);verbal cues required  -AC    Transfers, Sit-Stand-Sit, Assist Device rolling walker   once HHA x2 and once with RW  -MS rolling walker  -AC    Transfer, Safety Issues knees buckling;step length decreased;weight-shifting ability decreased   L leg buckling  -MS knees buckling;other (see comments)   L knee  -AC    Transfer, Impairments ROM decreased;sensation decreased;strength decreased;impaired balance;motor control impaired;pain  -MS strength decreased  -AC    Transfer, Comment pt required blocking of her L leg to prevent buckling during her first sit to stand. She was able to use a walker for her second sit to stand without leg blocking  -MS     Gait Assessment/Treatment    Gait, Comment unable to due to L hip flexion weakness  -MS     Motor Skills/Interventions    Additional Documentation Balance Skills Training (Group)  -MS Balance Skills Training (Group)  -AC    Balance Skills Training    Sitting-Level of Assistance Independent  -MS Independent  -AC    Sitting-Balance Support Feet supported  -MS Feet supported  -AC    Standing-Level of Assistance Contact guard;x2  -MS Contact guard;x2  -AC    Static Standing Balance Support assistive device  -MS assistive device  -AC    Standing Balance # of Minutes CGA x2 with L knee was in full extension but min A when knee is bent  -MS able to stand with CGAx2 when using rw for UE suppot  -AC    Gait Balance-Level of Assistance Minimum assistance;x2  -MS Minimum assistance;x2  -AC    Gait Balance Support assistive device  -MS assistive device  -AC    Orthotics Prosthetics    Additional Documentation Orthosis Location (Group)  -MS Orthosis Location (Group);Orthosis Management/Training (Group)  -AC    Orthosis Location    Orthosis Location/Type neck/back  -MS neck/back  -AC    Orthosis, Neck/Back LSO (lumbar sacral  "orthosis)  -MS LSO (lumbar sacral orthosis)  -    Orthosis Management/Training    Orthosis Fabrication Detail pt owns LSO however it is not present at this time. Her  is bringing it today  -MS pt already owns LSO but does not have it with her for eval, family plans to bring it this morning  -    Orthosis Skills Training  activity limitations;restrictions/precautions  -AC    Sensory Assessment/Intervention    Sensory Impairment  numbness   c/o numbness in L calf once up EOB  -AC    Light Touch LLE  -MS     LLE Light Touch mild impairment   L3 dermatome \"dull\"  -MS     Positioning and Restraints    Pre-Treatment Position  in bed  -AC    Post Treatment Position chair  -MS chair  -AC    In Chair sitting;call light within reach;encouraged to call for assist;with family/caregiver;notified nsg  -MS sitting;call light within reach;encouraged to call for assist;with family/caregiver  -      01/17/18 1744 01/17/18 1743    General Information    Equipment Currently Used at Home  none  -AB    Living Environment    Lives With spouse  -AB     Living Arrangements house  -AB     Home Accessibility no concerns  -AB     Stair Railings at Home none  -AB     Type of Financial/Environmental Concern none  -AB     Transportation Available car  -AB       User Key  (r) = Recorded By, (t) = Taken By, (c) = Cosigned By    Initials Name Provider Type    AC Sebastián Howe, OTR/L Occupational Therapist    HERB Hale, PT DPT Physical Therapist    MS Nicole Olson, PT DPT Physical Therapist    NW Angle Nagel, PTA Physical Therapy Assistant    ALFONSO Mujica, SARAH Registered Nurse    AB Ximena Resendez, RN Registered Nurse    HIREN Matos, SARAH Registered Nurse    JANET Whitmore, RN Registered Nurse    RAD Barnhart, RN Registered Nurse    FINESSE Saucedo, BSW           Physical Therapy Education     Title: PT OT SLP Therapies (Active)     Topic: Physical Therapy (Active)     Point: Mobility " training (Done)    Learning Progress Summary    Learner Readiness Method Response Comment Documented by Status   Patient Acceptance E,D VU knee immobilizer wear, posture, changing positions while awake KR 01/20/18 1301 Done    Acceptance E VU spinal precautions, exercises for L hip flexion, use of brace once available MS 01/18/18 0854 Done   Family Acceptance E,D VU knee immobilizer wear, posture, changing positions while awake KR 01/20/18 1301 Done               Point: Body mechanics (Done)    Learning Progress Summary    Learner Readiness Method Response Comment Documented by Status   Patient Acceptance E,D VU knee immobilizer wear, posture, changing positions while awake KR 01/20/18 1301 Done   Family Acceptance E,D VU knee immobilizer wear, posture, changing positions while awake KR 01/20/18 1301 Done               Point: Precautions (Done)    Learning Progress Summary    Learner Readiness Method Response Comment Documented by Status   Patient Acceptance E,D VU knee immobilizer wear, posture, changing positions while awake KR 01/20/18 1301 Done    Acceptance E VU spinal precautions, exercises for L hip flexion, use of brace once available MS 01/18/18 0854 Done   Family Acceptance E,D VU knee immobilizer wear, posture, changing positions while awake KR 01/20/18 1301 Done                      User Key     Initials Effective Dates Name Provider Type Discipline     06/19/15 -  Yue Hale, PT DPT Physical Therapist PT    MS 08/02/16 -  Nicole Olson, PT DPT Physical Therapist PT                PT Recommendation and Plan  Anticipated Discharge Disposition: inpatient rehabilitation facility  Planned Therapy Interventions: balance training, bed mobility training, gait training, home exercise program, lumbar stabilization, motor coordination training, neuromuscular re-education, orthotic fitting/training, patient/family education, postural re-education, ROM (Range of Motion), stair training, strengthening,  stretching, swiss ball techniques, transfer training  PT Frequency: 2 times/day, per priority policy  Plan of Care Review  Plan Of Care Reviewed With: patient, family  Outcome Summary/Follow up Plan: Physical thrapy re-evaluation completed. Pt up in chair withLEFTY clifton. Issued and fitted knee immobilizer for LLE and educated on wear. Pt performed transfers with min A 1-2. Ambulated  short distance in hallway with CGA, but unable to clear LLE. Pt would benefit from continued therapy in order to improve transfers, gait and LE strength. Pt would benefit from inpatient rehab at discharge. .          IP PT Goals       01/20/18 1302 01/18/18 1133       Bed Mobility PT LTG    Bed Mobility PT LTG, Date Established 01/20/18  -KR 01/18/18  -MS     Bed Mobility PT LTG, Time to Achieve  by discharge  -MS     Bed Mobility PT LTG, Activity Type  all bed mobility  -MS     Bed Mobility PT LTG, Andrew Level  independent  -MS     Transfer Training PT LTG    Transfer Training PT LTG, Date Established 01/20/18  -KR 01/18/18  -MS     Transfer Training PT LTG, Time to Achieve  by discharge  -MS     Transfer Training PT LTG, Activity Type  sit to stand/stand to sit  -MS     Transfer Training PT LTG, Andrew Level  conditional independence  -MS     Transfer Training PT LTG, Assist Device  walker, rolling  -MS     Gait Training PT LTG    Gait Training Goal PT LTG, Date Established 01/20/18  -KR 01/18/18  -MS     Gait Training Goal PT LTG, Time to Achieve  by discharge  -MS     Gait Training Goal PT LTG, Andrew Level  contact guard assist  -MS     Gait Training Goal PT LTG, Assist Device  walker, rolling  -MS     Gait Training Goal PT LTG, Distance to Achieve  50ft clearing L foot 50% of the time  -MS     Stair Training PT LTG    Stair Training Goal PT LTG, Date Established 01/20/18  -KR 01/18/18  -MS     Stair Training Goal PT LTG, Time to Achieve  by discharge  -MS     Stair Training Goal PT LTG, Number of Steps  3  -MS      Stair Training Goal PT LTG, [place] Level  contact guard assist  -MS     Stair Training Goal PT LTG, Assist Device  1 handrail  -MS     Strength Goal PT LTG    Strength Goal PT LTG, Date Established 01/20/18  -KR 01/18/18  -MS     Strength Goal PT LTG, Time to Achieve  by discharge  -MS     Strength Goal PT LTG, Functional Goal  pt will demonstrate full AROM of left hip flexion  -MS       User Key  (r) = Recorded By, (t) = Taken By, (c) = Cosigned By    Initials Name Provider Type    KR Yue Hale, PT DPT Physical Therapist    MS Nicole Olson, PT DPT Physical Therapist                Outcome Measures       01/20/18 1300 01/20/18 1115 01/18/18 0841    How much help from another person do you currently need...    Turning from your back to your side while in flat bed without using bedrails? 3  -KR      Moving from lying on back to sitting on the side of a flat bed without bedrails? 3  -KR      Moving to and from a bed to a chair (including a wheelchair)? 2  -KR      Standing up from a chair using your arms (e.g., wheelchair, bedside chair)? 2  -KR      Climbing 3-5 steps with a railing? 1  -KR      To walk in hospital room? 3  -KR      AM-PAC 6 Clicks Score 14  -KR      How much help from another is currently needed...    Putting on and taking off regular lower body clothing?  2  -AC 2  -AC    Bathing (including washing, rinsing, and drying)  2  -AC 2  -AC    Toileting (which includes using toilet bed pan or urinal)  2  -AC 3  -AC    Putting on and taking off regular upper body clothing  3  -AC 4  -AC    Taking care of personal grooming (such as brushing teeth)  3  -AC 3  -AC    Eating meals  4  -AC 4  -AC    Score  16  -AC 18  -AC    Functional Assessment    Outcome Measure Options AM-PAC 6 Clicks Basic Mobility (PT)  -KR AM-PAC 6 Clicks Daily Activity (OT)  -AC AM-PAC 6 Clicks Daily Activity (OT)  -AC      01/18/18 0739          How much help from another person do you currently need...    Turning  from your back to your side while in flat bed without using bedrails? 3  -MS      Moving from lying on back to sitting on the side of a flat bed without bedrails? 3  -MS      Moving to and from a bed to a chair (including a wheelchair)? 3  -MS      Standing up from a chair using your arms (e.g., wheelchair, bedside chair)? 3  -MS      Climbing 3-5 steps with a railing? 1  -MS      To walk in hospital room? 1  -MS      AM-PAC 6 Clicks Score 14  -MS      Functional Assessment    Outcome Measure Options AM-PAC 6 Clicks Basic Mobility (PT)  -MS        User Key  (r) = Recorded By, (t) = Taken By, (c) = Cosigned By    Initials Name Provider Type    TABITHA Howe, OTR/L Occupational Therapist    HERB Hale, PT DPT Physical Therapist    MS Nicole Olson, PT DPT Physical Therapist           Time Calculation:         PT Charges       01/20/18 1309          Time Calculation    Start Time 1030  -KR      Stop Time 1130  -KR      Time Calculation (min) 60 min  -KR      PT Received On 01/20/18  -KR      PT Goal Re-Cert Due Date 01/30/18  -KR      Time Calculation- PT    Total Timed Code Minutes- PT 30 minute(s)  -KR        User Key  (r) = Recorded By, (t) = Taken By, (c) = Cosigned By    Initials Name Provider Type    HERB Hale, PT DPT Physical Therapist          Therapy Charges for Today     Code Description Service Date Service Provider Modifiers Qty    56653454587 HC PT MOBILITY CURRENT 1/20/2018 Yue Hale, PT DPT GP, CL 1    31028258179 HC PT MOBILITY PROJECTED 1/20/2018 Yue Hale, PT DPT GP, CJ 1    35061329263 HC GAIT TRAINING EA 15 MIN 1/20/2018 Yue Hale, PT DPT GP, KX 1    37906523364 HC PT THERAPEUTIC ACT EA 15 MIN 1/20/2018 Yue Hale PT DPT GP, KX 1    00584761184 HC PT RE-EVAL ESTABLISHED PLAN 2 1/20/2018 Yue Hale, PT DPT GP, KX 1          PT G-Codes  PT Professional Judgement Used?: Yes  Outcome Measure Options: AM-PAC 6 Clicks Basic Mobility  (PT)  Score: 14  Functional Limitation: Mobility: Walking and moving around  Mobility: Walking and Moving Around Current Status (): At least 60 percent but less than 80 percent impaired, limited or restricted  Mobility: Walking and Moving Around Goal Status (): At least 20 percent but less than 40 percent impaired, limited or restricted      Yue Hale, PT DPT  1/20/2018

## 2018-01-20 NOTE — DISCHARGE PLACEMENT REQUEST
"To:  Superior   From:  Clarissa Saucedo, BSW  [phone]    Laura Shin (62 y.o. Female)     Date of Birth Social Security Number Address Home Phone MRN    1955  [id number] [facility] [address] [phone] [id number]    Jew Marital Status          Sabianist        Admission Date Admission Type Admitting Provider Attending Provider Department, Room/Bed    1/17/18 Elective Fortino Oropeza MD Hill, Clint Patrick, MD [facility] 3A, 354/1    Discharge Date Discharge Disposition Discharge Destination                      Attending Provider: Fortino Oropeza MD     Allergies:  Atorvastatin, Amoxicillin, Nabumetone, Niacin Er, Penicillins, Simvastatin    Isolation:  None   Infection:  None   Code Status:  FULL    Ht:  165 cm (64.96\")   Wt:  93.8 kg (206 lb 12.7 oz)    Admission Cmt:  None   Principal Problem:  None                Active Insurance as of 1/17/2018     Primary Coverage     Payor Plan Insurance Group Employer/Plan Group    MEDICARE MEDICARE A & B      Payor Plan Address Payor Plan Phone Number Effective From Effective To    [address] [phone] 3/1/2014     [place] [address]       Subscriber Name Subscriber Birth Date Member ID       LAURA SHIN 1955 [id number]           Secondary Coverage     Payor Plan Insurance Group Employer/Plan Group    [employer]ERCIAL AETNA [facility] [id number]     Payor Plan Address Payor Plan Phone Number Effective From Effective To    [address] [phone] 1/23/2015     [place] [address]       Subscriber Name Subscriber Birth Date Member ID       NOEL SHIN 1955 [id number]                 Emergency Contacts      (Rel.) Home Phone Work Phone Mobile Phone    KipNoel (Spouse) [phone] -- --    Justa Shinesph (Son) -- [phone] --               History & Physical      H&P filed by Melo Barajas MD at 1/11/2018 11:13 AM      Scan on 1/17/2018 : HISTORY AND PHYSICAL " 1/17/18 (below)              Electronically signed by Interface, Scans Incoming at 1/11/2018 11:13 AM      Fortino Oropeza MD at 1/17/2018  2:07 PM          H&P reviewed. The patient was examined and there are no changes to the H&P.     Electronically signed by Fortino Oropeza MD at 1/17/2018  2:07 PM    Source Note          Scan on 1/17/2018 : HISTORY AND PHYSICAL 1/17/18 (below)              Electronically signed by Interface, Scans Incoming at 1/11/2018 11:13 AM              Prior to Admission Medications     Prescriptions Last Dose Informant Patient Reported? Taking?    HUMIRA PEN 40 MG/0.8ML Pen-injector Kit Past Month  Yes Yes    Inject 40 mg under the skin Take As Directed. EVERY 10 DAYS    HYDROcodone-acetaminophen (NORCO)  MG per tablet 1/16/2018 Self Yes Yes    Take 1 tablet by mouth Every 6 (Six) Hours As Needed for Moderate Pain .    aspirin 81 MG tablet 1/10/2018  Yes No    Take 81 mg by mouth daily.    BRILINTA 90 MG tablet tablet 1/10/2018  Yes No    Take 90 mg by mouth 2 (Two) Times a Day.    carvedilol (COREG) 12.5 MG tablet 1/17/2018  Yes No    Take 12.5 mg by mouth 2 (Two) Times a Day With Meals.    cetirizine (zyrTEC) 10 MG tablet 1/16/2018  Yes No    Take 10 mg by mouth daily     CloNIDine (CATAPRES) 0.1 MG tablet 1/16/2018  Yes No    Take 0.2 mg by mouth Every Night.    fluticasone (FLONASE) 50 MCG/ACT nasal spray 1/16/2018  No No    2 sprays into each nostril Daily.    folic acid (FOLVITE) 1 MG tablet 1/16/2018  Yes No    Take 1 mg by mouth daily.      furosemide (LASIX) 40 MG tablet 1/16/2018  Yes No    Take 40 mg by mouth daily.    isosorbide mononitrate (IMDUR) 30 MG 24 hr tablet 1/17/2018  Yes No    Take 30 mg by mouth Daily.    leflunomide (ARAVA) 20 MG tablet More than a month  Yes No    Take 20 mg by mouth daily.      levothyroxine (SYNTHROID, LEVOTHROID) 75 MCG tablet 1/16/2018  Yes No    Take 75 mcg by mouth daily.    LYRICA 50 MG capsule 1/16/2018  Yes No    Take 50 mg  by mouth Daily.    MULTIPLE VITAMIN PO 1/16/2018  Yes No    Take 1 tablet by mouth daily.      nitroglycerin (NITROSTAT) 0.4 MG SL tablet Unknown  Yes No    Place 1 tablet under the tongue every 5 minutes as needed for Chest pain    oseltamivir (TAMIFLU) 75 MG capsule 1/16/2018  Yes No    Take 75 mg by mouth Daily.  Dx FLU    pantoprazole (PROTONIX) 40 MG EC tablet 1/16/2018  Yes No    Take 40 mg by mouth Daily.    predniSONE (DELTASONE) 1 MG tablet 1/16/2018  Yes No    Take 2 mg by mouth Daily.    PROLIA 60 MG/ML solution syringe More than a month  No No    INJECT 60MG SUB-Q EVERY 6 MONTHS    salsalate (DISALCID) 500 MG tablet More than a month  Yes No    TAKE 5 PILLS DAILY MONDAY, WEDNESDAY, FRIDAY AND SUNDAY & 4 PILLS DAILY TUESDAY, THURSDAY AND SATURDAY    spironolactone (ALDACTONE) 25 MG tablet 1/16/2018  Yes No    Take 25 mg by mouth Daily.    traMADol-acetaminophen (ULTRACET) 37.5-325 MG per tablet 1/15/2018  Yes No    Take 1 tablet by mouth 2 (Two) Times a Day.    valACYclovir (VALTREX) 500 MG tablet 1/16/2018  Yes No    Take 500 mg by mouth Daily.    vitamin D (ERGOCALCIFEROL) 35442 units capsule capsule 1/14/2018  Yes No    Take 50,000 Units by mouth Every 7 (Seven) Days.    ZETIA 10 MG tablet 1/16/2018  Yes No    Take 10 mg by mouth Daily.          Hospital Medications (active)       Dose Frequency Start End    acetaminophen (TYLENOL) tablet 650 mg 650 mg Every 4 Hours PRN 1/17/2018     Sig - Route: Take 2 tablets by mouth Every 4 (Four) Hours As Needed for Mild Pain . - Oral    ALPRAZolam (XANAX) tablet 0.5 mg 0.5 mg 3 Times Daily PRN 1/17/2018 1/27/2018    Sig - Route: Take 1 tablet by mouth 3 (Three) Times a Day As Needed for Anxiety. - Oral    bisacodyl (DULCOLAX) EC tablet 5 mg 5 mg Daily PRN 1/17/2018     Sig - Route: Take 1 tablet by mouth Daily As Needed for Constipation. - Oral    carvedilol (COREG) tablet 12.5 mg 12.5 mg 2 Times Daily With Meals 1/17/2018     Sig - Route: Take 2 tablets  "by mouth 2 (Two) Times a Day With Meals. - Oral    ceFAZolin (ANCEF) 1 g/10ml IV PUSH syringe 1 g Every 8 Hours 1/19/2018 1/21/2018    Sig - Route: Infuse 10 mL into a venous catheter Every 8 (Eight) Hours. - Intravenous    cetirizine (zyrTEC) tablet 10 mg 10 mg Daily 1/17/2018     Sig - Route: Take 1 tablet by mouth Daily. - Oral    CloNIDine (CATAPRES) tablet 0.2 mg 0.2 mg Nightly 1/17/2018     Sig - Route: Take 1 tablet by mouth Every Night. - Oral    cyclobenzaprine (FLEXERIL) tablet 10 mg 10 mg 3 Times Daily PRN 1/17/2018     Sig - Route: Take 1 tablet by mouth 3 (Three) Times a Day As Needed for Muscle Spasms. - Oral    docusate sodium (COLACE) capsule 100 mg 100 mg 2 Times Daily PRN 1/17/2018     Sig - Route: Take 1 capsule by mouth 2 (Two) Times a Day As Needed for Constipation. - Oral    fluticasone (FLONASE) 50 MCG/ACT nasal spray 2 spray 2 spray Daily 1/17/2018     Sig - Route: 2 sprays into each nostril Daily. - Nasal    folic acid (FOLVITE) tablet 1 mg 1 mg Daily 1/17/2018     Sig - Route: Take 1 tablet by mouth Daily. - Oral    furosemide (LASIX) tablet 40 mg 40 mg Daily 1/17/2018     Sig - Route: Take 1 tablet by mouth Daily. - Oral    HYDROcodone-acetaminophen (NORCO) 7.5-325 MG per tablet 1 tablet 1 tablet Every 4 Hours PRN 1/17/2018 1/27/2018    Sig - Route: Take 1 tablet by mouth Every 4 (Four) Hours As Needed for Moderate Pain . - Oral    HYDROmorphone (DILAUDID) injection 0.5 mg 0.5 mg As Needed 1/19/2018 1/19/2018    Sig - Route: Infuse 0.5 mL into a venous catheter As Needed for Moderate Pain  or Severe Pain . - Intravenous    HYDROmorphone (DILAUDID) injection 1 mg 1 mg Every 2 Hours PRN 1/17/2018 1/27/2018    Sig - Route: Infuse 1 mL into a venous catheter Every 2 (Two) Hours As Needed for Severe Pain . - Intravenous    Linked Group 1:  \"And\" Linked Group Details        isosorbide mononitrate (IMDUR) 24 hr tablet 30 mg 30 mg Daily 1/18/2018     Sig - Route: Take 1 tablet by mouth Daily. - " "Oral    ketorolac (TORADOL) injection 30 mg 30 mg Once 1/19/2018 1/19/2018    Sig - Route: Infuse 30 mg into a venous catheter 1 (One) Time. - Intravenous    levothyroxine (SYNTHROID, LEVOTHROID) tablet 75 mcg 75 mcg Every Early Morning 1/18/2018     Sig - Route: Take 1 tablet by mouth Every Morning. - Oral    magnesium hydroxide (MILK OF MAGNESIA) suspension 2400 mg/10mL 10 mL 10 mL Daily PRN 1/17/2018     Sig - Route: Take 10 mL by mouth Daily As Needed for Constipation. - Oral    melatonin tablet 3 mg 3 mg Nightly PRN 1/18/2018     Sig - Route: Take 1 tablet by mouth At Night As Needed for Sleep. - Oral    naloxone (NARCAN) injection 0.4 mg 0.4 mg Every 5 Minutes PRN 1/17/2018     Sig - Route: Infuse 1 mL into a venous catheter Every 5 (Five) Minutes As Needed for Respiratory Depression. - Intravenous    Linked Group 1:  \"And\" Linked Group Details        nitroglycerin (NITROSTAT) SL tablet 0.4 mg 0.4 mg Every 5 Minutes PRN 1/17/2018     Sig - Route: Place 1 tablet under the tongue Every 5 (Five) Minutes As Needed for Chest Pain. - Sublingual    ondansetron (ZOFRAN) injection 4 mg 4 mg Every 6 Hours PRN 1/17/2018     Sig - Route: Infuse 2 mL into a venous catheter Every 6 (Six) Hours As Needed for Nausea or Vomiting. - Intravenous    Linked Group 2:  \"Or\" Linked Group Details        ondansetron (ZOFRAN) tablet 4 mg 4 mg Every 6 Hours PRN 1/17/2018     Sig - Route: Take 1 tablet by mouth Every 6 (Six) Hours As Needed for Nausea or Vomiting. - Oral    Linked Group 2:  \"Or\" Linked Group Details        ondansetron ODT (ZOFRAN-ODT) disintegrating tablet 4 mg 4 mg Every 6 Hours PRN 1/17/2018     Sig - Route: Take 1 tablet by mouth Every 6 (Six) Hours As Needed for Nausea or Vomiting. - Oral    Linked Group 2:  \"Or\" Linked Group Details        oxyCODONE-acetaminophen (PERCOCET) 7.5-325 MG per tablet 1 tablet 1 tablet Every 4 Hours PRN 1/17/2018 1/27/2018    Sig - Route: Take 1 tablet by mouth Every 4 (Four) Hours As " Needed for Moderate Pain . - Oral    oxyCODONE-acetaminophen (PERCOCET) 7.5-325 MG per tablet 2 tablet 2 tablet Every 4 Hours PRN 1/17/2018 1/27/2018    Sig - Route: Take 2 tablets by mouth Every 4 (Four) Hours As Needed for Severe Pain . - Oral    pantoprazole (PROTONIX) EC tablet 40 mg 40 mg Daily 1/17/2018     Sig - Route: Take 1 tablet by mouth Daily. - Oral    polyethylene glycol 3350 powder (packet) 17 g Daily PRN 1/17/2018     Sig - Route: Take 17 g by mouth Daily As Needed for Constipation (constipation). - Oral    predniSONE (DELTASONE) tablet 2 mg 2 mg Daily 1/17/2018     Sig - Route: Take 2 tablets by mouth Daily. - Oral    pregabalin (LYRICA) capsule 50 mg 50 mg Daily 1/17/2018     Sig - Route: Take 1 capsule by mouth Daily. - Oral    promethazine (PHENERGAN) injection 12.5 mg 12.5 mg Every 6 Hours PRN 1/17/2018     Sig - Route: Inject 0.5 mL into the shoulder, thigh, or buttocks Every 6 (Six) Hours As Needed for Nausea or Vomiting. - Intramuscular    sodium chloride 0.9 % flush 1-10 mL 1-10 mL As Needed 1/17/2018     Sig - Route: Infuse 1-10 mL into a venous catheter As Needed for Line Care. - Intravenous    sodium chloride 0.9 % infusion 50 mL/hr Continuous 1/17/2018     Sig - Route: Infuse 50 mL/hr into a venous catheter Continuous. - Intravenous    spironolactone (ALDACTONE) tablet 25 mg 25 mg Daily 1/17/2018     Sig - Route: Take 1 tablet by mouth Daily. - Oral    atropine sulfate injection 0.5 mg (Discontinued) 0.5 mg Once As Needed 1/19/2018 1/19/2018    Sig - Route: Infuse 5 mL into a venous catheter 1 (One) Time As Needed for Bradycardia (symptomatic bradycardia (hypotension, dizziness, confusion). Notify attending anesthesiologist.). - Intravenous    Reason for Discontinue: Patient Transfer    ceFAZolin (ANCEF) injection (Discontinued)  As Needed 1/19/2018 1/19/2018    Sig - Route: Infuse  into a venous catheter As Needed. - Intravenous    Reason for Discontinue: Anesthesia Stop    ceFAZolin  (ANCEF) IVPB (duplex) 2 g (Discontinued) 2 g On Call to O.R. 1/19/2018 1/19/2018    Sig - Route: Infuse 2,000 mg into a venous catheter On Call to the Operating Room. - Intravenous    Reason for Discontinue: Patient Transfer    flumazenil (ROMAZICON) injection 0.2 mg (Discontinued) 0.2 mg As Needed 1/19/2018 1/19/2018    Sig - Route: Infuse 2 mL into a venous catheter As Needed (unresponsiveness). - Intravenous    Reason for Discontinue: Patient Transfer    gelatin absorbable (GELFOAM) powder (Discontinued)  As Needed 1/19/2018 1/19/2018    Sig: As Needed.    Reason for Discontinue: Patient Discharge    hydrALAZINE (APRESOLINE) injection 5 mg (Discontinued) 5 mg Every 10 Minutes PRN 1/19/2018 1/19/2018    Sig - Route: Infuse 0.25 mL into a venous catheter Every 10 (Ten) Minutes As Needed for High Blood Pressure (for systolic blood pressure greater than 180 mmHg or diastolic blood pressure greater than 105 mmHg when HR less than 60). - Intravenous    Reason for Discontinue: Patient Transfer    ipratropium-albuterol (DUO-NEB) nebulizer solution 3 mL (Discontinued) 3 mL Once As Needed 1/19/2018 1/19/2018    Sig - Route: Take 3 mL by nebulization 1 (One) Time As Needed for Wheezing or Shortness of Air (bronchospasm). - Nebulization    Reason for Discontinue: Patient Transfer    labetalol (NORMODYNE,TRANDATE) injection 5 mg (Discontinued) 5 mg Every 5 Minutes PRN 1/19/2018 1/19/2018    Sig - Route: Infuse 1 mL into a venous catheter Every 5 (Five) Minutes As Needed for High Blood Pressure (for systolic blood pressure greater than 180 mmHg or diastolic blood pressure greater than 105 mmHg). - Intravenous    Reason for Discontinue: Patient Transfer    lactated ringers infusion (Discontinued) 100 mL/hr Continuous 1/19/2018 1/19/2018    Sig - Route: Infuse 100 mL/hr into a venous catheter Continuous. - Intravenous    lactated ringers infusion (Discontinued) 30 mL/hr Continuous 1/19/2018 1/19/2018    Sig - Route: Infuse 30  "mL/hr into a venous catheter Continuous. - Intravenous    lidocaine (XYLOCAINE) 2% injection (Discontinued)  As Needed 1/19/2018 1/19/2018    Sig - Route: Inject  as directed As Needed. - Injection    Reason for Discontinue: Anesthesia Stop    meperidine (DEMEROL) injection 12.5 mg (Discontinued) 12.5 mg Every 5 Minutes PRN 1/19/2018 1/19/2018    Sig - Route: Infuse 0.5 mL into a venous catheter Every 5 (Five) Minutes As Needed for Shivering (May repeat x 3). - Intravenous    Reason for Discontinue: Patient Transfer    metoclopramide (REGLAN) injection 5 mg (Discontinued) 5 mg Every 15 Minutes PRN 1/19/2018 1/19/2018    Sig - Route: Infuse 1 mL into a venous catheter Every 15 (Fifteen) Minutes As Needed for Nausea or Vomiting (for nausea/vomiting). - Intravenous    Reason for Discontinue: Patient Transfer    midazolam (VERSED) injection 1 mg (Discontinued) 1 mg Every 5 Minutes PRN 1/19/2018 1/19/2018    Sig - Route: Infuse 1 mL into a venous catheter Every 5 (Five) Minutes As Needed for Anxiety (Max 4mg midazolam TOTAL). - Intravenous    Reason for Discontinue: Patient Transfer    Linked Group 3:  \"Or\" Linked Group Details        midazolam (VERSED) injection 2 mg (Discontinued) 2 mg Every 5 Minutes PRN 1/19/2018 1/19/2018    Sig - Route: Infuse 2 mL into a venous catheter Every 5 (Five) Minutes As Needed for Anxiety (Max 4mg midazolam TOTAL). - Intravenous    Reason for Discontinue: Patient Transfer    Linked Group 3:  \"Or\" Linked Group Details        Morphine sulfate (PF) injection 1 mg (Discontinued) 1 mg Every 4 Hours PRN 1/17/2018 1/19/2018    Sig - Route: Infuse 0.5 mL into a venous catheter Every 4 (Four) Hours As Needed for Moderate Pain . - Intravenous    Linked Group 4:  \"And\" Linked Group Details        Morphine sulfate (PF) injection 2 mg (Discontinued) 2 mg As Needed 1/19/2018 1/19/2018    Sig - Route: Infuse 1 mL into a venous catheter As Needed for Mild Pain . - Intravenous    Reason for Discontinue: " "Patient Transfer    naloxone (NARCAN) injection 0.04 mg (Discontinued) 0.04 mg As Needed 1/19/2018 1/19/2018    Sig - Route: Infuse 0.1 mL into a venous catheter As Needed for Opioid Reversal (unresponsiveness, decrease oxygen saturation). - Intravenous    Reason for Discontinue: Patient Transfer    naloxone (NARCAN) injection 0.4 mg (Discontinued) 0.4 mg Every 5 Minutes PRN 1/17/2018 1/19/2018    Sig - Route: Infuse 1 mL into a venous catheter Every 5 (Five) Minutes As Needed for Respiratory Depression. - Intravenous    Linked Group 4:  \"And\" Linked Group Details        ondansetron (ZOFRAN) injection 4 mg (Discontinued) 4 mg As Needed 1/19/2018 1/19/2018    Sig - Route: Infuse 2 mL into a venous catheter As Needed for Nausea or Vomiting. - Intravenous    Reason for Discontinue: Patient Transfer    oseltamivir (TAMIFLU) capsule 75 mg (Discontinued) 75 mg Daily 1/17/2018 1/19/2018    Sig - Route: Take 1 capsule by mouth Daily. - Oral    Propofol (DIPRIVAN) injection (Discontinued)  As Needed 1/19/2018 1/19/2018    Sig: As Needed.    Reason for Discontinue: Anesthesia Stop    sodium chloride (NS) irrigation solution (Discontinued)  As Needed 1/19/2018 1/19/2018    Sig: As Needed.    Reason for Discontinue: Patient Discharge    sodium chloride 0.9 % flush 1-10 mL (Discontinued) 1-10 mL As Needed 1/17/2018 1/19/2018    Sig - Route: Infuse 1-10 mL into a venous catheter As Needed for Line Care. - Intravenous    sodium chloride 0.9 % flush 1-10 mL (Discontinued) 1-10 mL As Needed 1/19/2018 1/19/2018    Sig - Route: Infuse 1-10 mL into a venous catheter As Needed for Line Care. - Intravenous    Reason for Discontinue: Patient Transfer    sodium chloride 0.9 % flush 1-10 mL (Discontinued) 1-10 mL As Needed 1/19/2018 1/19/2018    Sig - Route: Infuse 1-10 mL into a venous catheter As Needed for Line Care. - Intravenous    Reason for Discontinue: Patient Transfer    sodium chloride 0.9 % solution (Discontinued)  As Needed " 1/19/2018 1/19/2018    Sig: As Needed.    Reason for Discontinue: Patient Discharge    succinylcholine (ANECTINE) injection (Discontinued)  As Needed 1/19/2018 1/19/2018    Sig - Route: Infuse  into a venous catheter As Needed. - Intravenous    Reason for Discontinue: Anesthesia Stop    SUFentanil (SUFENTA) injection (Discontinued)  As Needed 1/19/2018 1/19/2018    Sig - Route: Infuse  into a venous catheter As Needed for Severe Pain . - Intravenous    Reason for Discontinue: Anesthesia Stop    thrombin spray (Discontinued)  As Needed 1/19/2018 1/19/2018    Sig: As Needed.    Reason for Discontinue: Patient Discharge    valACYclovir (VALTREX) tablet 500 mg (Discontinued) 500 mg Daily 1/17/2018 1/19/2018    Sig - Route: Take 1 tablet by mouth Daily. - Oral             Operative/Procedure Notes (most recent note)      VIRGIL Rodriguez at 1/19/2018  1:49 PM  Version 1 of 1         LUMBAR FUSION DECOMPRESSON WITH PEDICLE SCREWS  Progress Note    Tawny Shin  1/17/2018 - 1/19/2018    Pre-op Diagnosis:   LUMBAR STENOSIS        Post-Op Diagnosis Codes:       Procedure/CPT® Codes:      Procedure(s):  L3-4,L4-5 DECOMPRESSION, POSTERIOR SPINAL FUSION WITH INSTRUMENTATION    Surgeon(s):  Fortino Oropeza MD    Anesthesia: General    Staff:   Circulator: Sebastián Warner RN; Nino Davies RN  Scrub Person: Yumiko Christianson; Vlad Blanca  Vendor Representative: Aron Walter    Estimated Blood Loss: 200ml    Urine Voided: * No values recorded between 1/19/2018 11:01 AM and 1/19/2018  1:48 PM *    Specimens:                None      Drains:           Findings: SEE OPERATIVE REPORT    Complications: NONE      VIRGIL Rodriguez     Date: 1/19/2018  Time: 1:49 PM         Electronically signed by VIRGIL Rodriguez at 1/19/2018  1:50 PM           Physician Progress Notes (most recent note)      VIRGIL Coy at 1/20/2018  8:45 AM  Version 1 of 1         Orthopedic Spine Progress  Note    Tawny Shin  62 y.o.  female  Subjective     Post-Operative Day # 3,1    Systemic or Specific Complaints:     Pain better controlled today, left leg pain better weakness same. Small fall when standing this am.     Objective     Vital signs in last 24 hours:  Temp:  [97.3 °F (36.3 °C)-98 °F (36.7 °C)] 98 °F (36.7 °C)  Heart Rate:  [59-74] 71  Resp:  [12-16] 16  BP: (110-168)/(47-93) 110/50    Physical Exam:    A&O x3  B.LE NVI   Abdomen soft  Dressing CDI    Diagnostic Data:  CBC:    Results from last 7 days  Lab Units 18  0447   WBC 10*3/mm3 6.43   RBC 10*6/mm3 4.01*   HEMOGLOBIN g/dL 11.9*   HEMATOCRIT % 36.0*   PLATELETS 10*3/mm3 155          Assessment/Plan     1. Status Post LLIF L3-L5 POD 3, Zee/PSF L3-L5 POD 1      Plan:  1. PT/OT today   2. Continue current pain medications   3. Knee immobilizer during ambulation   4. LSO when OOB  5. Rehab consult       VIRGIL Coy    Date: 2018  Time: 8:45 AM     Electronically signed by VIRGIL Coy at 2018  8:47 AM        Consult Notes (most recent note)     No notes of this type exist for this encounter.           Physical Therapy Notes (most recent note)      Nicole Olson, PT DPT at 2018 11:39 AM  Version 1 of 1         Acute Care - Physical Therapy Initial Evaluation  [facility]     Patient Name: Tawny Shin  : 1955  MRN: [id number]  Today's Date: 2018   Onset of Illness/Injury or Date of Surgery Date: 18  Date of Referral to PT: 18  Referring Physician: Dr. Oropeza      Admit Date: 2018     Visit Dx:    ICD-10-CM ICD-9-CM   1. Impaired mobility and ADLs Z74.09 799.89   2. Impaired mobility Z74.09 799.89     Patient Active Problem List   Diagnosis   • Eustachian tube dysfunction   • Sensorineural hearing loss   • Lumbago of multiple sites in spine with sciatica   • Spondylolisthesis of lumbar region     Past Medical History:   Diagnosis Date   • Arthritis    • Asthma    • Chronic  sinusitis    • Eustachian tube dysfunction    • Heart disease    • Herpes simplex    • Hypertension    • Laryngitis sicca    • Laryngitis, chronic    • MI (myocardial infarction)    • Otorrhea    • Sensorineural hearing loss    • Sjogren's disease      Past Surgical History:   Procedure Laterality Date   • A-V CARDIAC PACEMAKER INSERTION  2016   • ATRIAL CARDIAC PACEMAKER INSERTION     • CORONARY ANGIOPLASTY WITH STENT PLACEMENT      X 2; 2013 & 2014   • MYRINGOTOMY W/ TUBES  09/04/2014    TUBES NO LONGER IN PLACE          PT ASSESSMENT (last 72 hours)      PT Evaluation       01/18/18 0800 01/18/18 0739    Rehab Evaluation    Document Type  evaluation  -MS    Subjective Information  agree to therapy;complains of;weakness;pain   L hip flex weakness  -MS    General Information    Patient Profile Review  yes  -MS    Onset of Illness/Injury or Date of Surgery Date  01/17/18  -MS    Referring Physician  Dr. Oropeza  -MS    General Observations  pt lying in bed, awake and alert in no apparent distress  -MS    Pertinent History Of Current Problem  R sided back pain, buttock and leg pain; Dx: lumbar stenosis L3-5, DDD, lumbar radiculopathy, L4-5 disc herniation w/extruded fragment, spondylolisthesis L3-5 s/p L sided LLIF via retroperitoneal approach on 1/17/18  -MS    Precautions/Limitations  brace on when up;fall precautions;spinal precautions   L leg buddy  -MS    Plans/Goals Discussed With  patient;agreed upon  -MS    Risks Reviewed  patient:;LOB;nausea/vomiting;dizziness;increased discomfort  -MS    Benefits Reviewed  patient:;improve function;increase independence;increase strength;increase balance;decrease pain;increase knowledge  -MS    Barriers to Rehab  physical barrier  -MS    Clinical Impression    Date of Referral to PT  01/17/18  -MS    Patient/Family Goals Statement  walk and move left leg  -MS    Criteria for Skilled Therapeutic Interventions Met  yes;treatment indicated  -MS    Pathology/Pathophysiology  Noted (Describe Specifically for Each System)  musculoskeletal  -MS    Impairments Found (describe specific impairments)  gait, locomotion, and balance;muscle performance;ROM;sensory Integrity  -MS    Rehab Potential  good, to achieve stated therapy goals  -MS    Predicted Duration of Therapy Intervention (days/wks)  until discharge  -MS    Vital Signs    Pre SpO2 (%)  99  -MS    O2 Delivery Pre Treatment  supplemental O2   3L  -MS    Intra SpO2 (%)  94  -MS    O2 Delivery Intra Treatment  room air  -MS    Post SpO2 (%)  97  -MS    O2 Delivery Post Treatment  room air  -MS    Pre Patient Position  Supine  -MS    Intra Patient Position  Sitting  -MS    Post Patient Position  Sitting  -MS    Pain Assessment    Pain Assessment  0-10  -MS    Pain Score  4  -MS    Pain Type  Acute pain;Surgical pain  -MS    Pain Location  Back  -MS    Pain Orientation  Left  -MS    Pain Radiating Towards  L flank, L thigh  -MS    Pain Descriptors  Aching;Throbbing  -MS    Pain Intervention(s)  Medication (See MAR);Repositioned;Ambulation/increased activity  -MS    Response to Interventions  reports increase in pain  -MS    Cognitive Assessment/Intervention    Current Cognitive/Communication Assessment  functional  -MS    Orientation Status  oriented x 4  -MS    Follows Commands/Answers Questions  100% of the time;able to follow single-step instructions  -MS    Personal Safety  WNL/WFL  -MS    Personal Safety Interventions  fall prevention program maintained;gait belt;muscle strengthening facilitated;nonskid shoes/slippers when out of bed;supervised activity  -MS    ROM (Range of Motion)    General ROM  lower extremity range of motion deficits identified  -MS    General LE Assessment    ROM Detail  L hip flexion impaired 75%  -MS    MMT (Manual Muscle Testing)    General MMT Assessment  lower extremity strength deficits identified  -MS    Lower Extremity    Lower Ext Manual Muscle Testing Detail  L hip flexion 2/5  -MS    Bed Mobility,  Assessment/Treatment    Bed Mobility, Assistive Device  bed rails  -MS    Bed Mobility, Roll Left, [place]  contact guard assist;verbal cues required;nonverbal cues required (demo/gesture)  -MS    Bed Mob, Sidelying to Sit, [place]  minimum assist (75% patient effort);verbal cues required;nonverbal cues required (demo/gesture)   min A for L LE for hip flexion  -MS    Bed Mobility, Impairments  strength decreased  -MS    Transfer Assessment/Treatment    Transfers, Bed-Chair [place]  minimum assist (75% patient effort);2 person assist required;verbal cues required;nonverbal cues required (demo/gesture)   bed to BSC  -MS    Transfers, Chair-Bed [place]  minimum assist (75% patient effort);2 person assist required;verbal cues required;nonverbal cues required (demo/gesture)   BSC to chair  -MS    Transfers, Sit-Stand [place]  minimum assist (75% patient effort);2 person assist required;verbal cues required;nonverbal cues required (demo/gesture)  -MS    Transfers, Stand-Sit [place]  minimum assist (75% patient effort);2 person assist required;verbal cues required;nonverbal cues required (demo/gesture)  -MS    Transfers, Sit-Stand-Sit, Assist Device  rolling walker   once HHA x2 and once with RW  -MS    Transfer, Safety Issues  knees buckling;step length decreased;weight-shifting ability decreased   L leg buckling  -MS    Transfer, Impairments  ROM decreased;sensation decreased;strength decreased;impaired balance;motor control impaired;pain  -MS    Transfer, Comment  pt required blocking of her L leg to prevent buckling during her first sit to stand. She was able to use a walker for her second sit to stand without leg blocking  -MS    Gait Assessment/Treatment    Gait, Comment  unable to due to L hip flexion weakness  -MS    Motor Skills/Interventions    Additional Documentation  Balance Skills Training (Group)  -MS    Balance Skills Training    Sitting-Level of Assistance  Independent  -MS     "Sitting-Balance Support  Feet supported  -MS    Standing-Level of Assistance  Contact guard;x2  -MS    Static Standing Balance Support  assistive device  -MS    Standing Balance # of Minutes  CGA x2 with L knee was in full extension but min A when knee is bent  -MS    Gait Balance-Level of Assistance  Minimum assistance;x2  -MS    Gait Balance Support  assistive device  -MS    Orthotics Prosthetics    Additional Documentation  Orthosis Location (Group)  -MS    Orthosis Location    Orthosis Location/Type  neck/back  -MS    Orthosis, Neck/Back  LSO (lumbar sacral orthosis)  -MS    Orthosis Management/Training    Orthosis Fabrication Detail  pt owns LSO however it is not present at this time. Her  is bringing it today  -MS    Sensory Assessment/Intervention    Light Touch  LLE  -MS    LLE Light Touch mild impairment  -SC mild impairment   L3 dermatome \"dull\"  -MS    Positioning and Restraints    Post Treatment Position  chair  -MS    In Chair  sitting;call light within reach;encouraged to call for assist;with family/caregiver;notified ns  -MS      01/18/18 0738 01/17/18 3005    Rehab Evaluation    Document Type evaluation  -AC     Subjective Information agree to therapy;complains of;pain  -AC     Symptoms Noted Comment LLE bdudy frequently with WBing/ambulation  -     General Information    Patient Profile Review yes  -AC     Onset of Illness/Injury or Date of Surgery Date 01/17/18  -     Referring Physician Dr. Oropeza  -     General Observations lying supine in bed, no apparent distress, 3L O2 per nc  -AC     Pertinent History Of Current Problem R sided back pain, buttock and leg pain; Dx: lumbar stenosis L3-5, DDD, lumbar radiculopathy, L4-5 disc herniation w/extruded fragment, spondylolisthesis L3-5 s/p L sided LLIF via retroperitoneal approach on 1/17/18 (XR L spine)  -AC     Precautions/Limitations brace on when up;spinal precautions  -AC     Prior Level of Function independent:;all household " mobility;community mobility;gait;transfer;ADL's;home management;cooking;cleaning;driving;shopping  -AC     Equipment Currently Used at Home cane, straight;walker, rolling;raised toilet  -AC     Plans/Goals Discussed With patient;agreed upon  -AC     Risks Reviewed patient:;LOB;nausea/vomiting;dizziness;increased discomfort;change in vital signs;increased drainage;lines disloged  -AC     Benefits Reviewed patient:;improve function;increase independence;increase strength;increase balance;decrease pain;decrease risk of DVT;improve skin integrity;increase knowledge  -AC     Barriers to Rehab none identified  -AC     Living Environment    Lives With spouse  -AC spouse  -AB    Living Arrangements house  -AC house  -AB    Home Accessibility bed and bath on same level;stairs to enter home;stairs within home;other (see comments)   walk in shower  -AC no concerns  -AB    Number of Stairs to Enter Home 2  -AC     Number of Stairs Within Home 11  -AC     Stair Railings at Home inside, present on left side  -AC none  -AB    Type of Financial/Environmental Concern  none  -AB    Transportation Available  car  -AB    Living Environment Comment spouse available evenings  -AC     Vital Signs    Pre Systolic BP Rehab 130  -AC     Pre Treatment Diastolic BP 66  -AC     Pre SpO2 (%) 99  -AC     O2 Delivery Pre Treatment supplemental O2   3L  -AC     Pre Patient Position Supine  -AC     Pain Assessment    Pain Assessment 0-10  -AC     Pain Score 4  -AC     Pain Type Acute pain;Surgical pain  -AC     Pain Location Back  -AC     Pain Orientation Left  -AC     Pain Radiating Towards L flank, L thigh  -AC     Pain Descriptors Aching;Throbbing  -AC     Pain Intervention(s) Medication (See MAR);Repositioned;Ambulation/increased activity  -AC     Response to Interventions tolerated  -AC     Cognitive Assessment/Intervention    Current Cognitive/Communication Assessment functional  -AC     Orientation Status oriented x 4  -AC     Follows  Commands/Answers Questions 100% of the time;able to follow multi-step instructions  -AC     Personal Safety WNL/WFL  -AC     Personal Safety Interventions fall prevention program maintained;gait belt;nonskid shoes/slippers when out of bed;supervised activity;elopement precautions initiated  -AC     ROM (Range of Motion)    General ROM Detail WFL AROM BUE  -AC     MMT (Manual Muscle Testing)    General MMT Assessment Detail functionally 4+/5 BUE  -AC     Bed Mobility, Assessment/Treatment    Bed Mobility, Assistive Device bed rails  -AC     Bed Mobility, Roll Right, [place] supervision required;verbal cues required  -AC     Bed Mob, Sidelying to Sit, [place] contact guard assist;verbal cues required  -AC     Bed Mobility, Impairments pain  -AC     Transfer Assessment/Treatment    Transfers, Sit-Stand [place] minimum assist (75% patient effort);2 person assist required;verbal cues required  -AC     Transfers, Stand-Sit [place] minimum assist (75% patient effort);verbal cues required  -AC     Transfers, Sit-Stand-Sit, Assist Device rolling walker  -AC     Transfer, Safety Issues knees buckling;other (see comments)   L knee  -AC     Transfer, Impairments strength decreased  -AC     Motor Skills/Interventions    Additional Documentation Balance Skills Training (Group)  -AC     Balance Skills Training    Sitting-Level of Assistance Independent  -AC     Sitting-Balance Support Feet supported  -AC     Standing-Level of Assistance Contact guard;x2  -AC     Static Standing Balance Support assistive device  -     Standing Balance # of Minutes able to stand with CGAx2 when using rw for UE suppot  -AC     Gait Balance-Level of Assistance Minimum assistance;x2  -AC     Gait Balance Support assistive device  -AC     Orthotics Prosthetics    Additional Documentation Orthosis Location (Group);Orthosis Management/Training (Group)  -AC     Orthosis Location    Orthosis Location/Type neck/back  -AC     Orthosis,  Neck/Back LSO (lumbar sacral orthosis)  -     Orthosis Management/Training    Orthosis Fabrication Detail pt already owns LSO but does not have it with her for eval, family plans to bring it this morning  -     Orthosis Skills Training activity limitations;restrictions/precautions  -     Sensory Assessment/Intervention    Sensory Impairment numbness   c/o numbness in L calf once up EOB  -     Positioning and Restraints    Pre-Treatment Position in bed  -     Post Treatment Position chair  -     In Chair sitting;call light within reach;encouraged to call for assist;with family/caregiver  -       01/17/18 4693       General Information    Equipment Currently Used at Home none  -AB       User Key  (r) = Recorded By, (t) = Taken By, (c) = Cosigned By    Initials Name Provider Type    AC Sebastián Howe, OTR/L Occupational Therapist    MS Nicole Olson, PT DPT Physical Therapist    AB Ximena Resendez, RN Registered Nurse    JANET Whitmore RN Registered Nurse          Physical Therapy Education     Title: PT OT SLP Therapies (Active)     Topic: Physical Therapy (Active)     Point: Mobility training (Done)    Learning Progress Summary    Learner Readiness Method Response Comment Documented by Status   Patient Acceptance E VU spinal precautions, exercises for L hip flexion, use of brace once available MS 01/18/18 0854 Done               Point: Precautions (Done)    Learning Progress Summary    Learner Readiness Method Response Comment Documented by Status   Patient Acceptance E VU spinal precautions, exercises for L hip flexion, use of brace once available MS 01/18/18 0854 Done                      User Key     Initials Effective Dates Name Provider Type [facility] 08/02/16 -  Nicole Olson, PT DPT Physical Therapist PT                PT Recommendation and Plan  Anticipated Discharge Disposition: home with assist  Planned Therapy Interventions: balance training, bed mobility training, gait  training, home exercise program, orthotic fitting/training, patient/family education, ROM (Range of Motion), strengthening, transfer training  PT Frequency: 2 times/day  Plan of Care Review  Plan Of Care Reviewed With: patient  Progress: progress toward functional goals as expected  Outcome Summary/Follow up Plan: PT evaluation completed. The patient demonstrates a significant left hip flexion weakness most likely from the lateral approach of the surgery. This weakness limited her ability to stand and attempt to take steps. Her left leg would buckle and she had difficulty with holding herself up with a walker. She does have minimal sensory deficit in the L3 dermatome. No reports of significant pain and LSO not present at this time.  to bring LSO later today. PT will continue to work with the patient to improve her left hip flexion and overall mobility. Assessment for discharge planning will be done after her second surgery on Friday.           IP PT Goals       01/18/18 1133          Bed Mobility PT LTG    Bed Mobility PT LTG, Date Established 01/18/18  -MS      Bed Mobility PT LTG, Time to Achieve by discharge  -MS      Bed Mobility PT LTG, Activity Type all bed mobility  -MS      Bed Mobility PT LTG, [place] Level independent  -MS      Transfer Training PT LTG    Transfer Training PT LTG, Date Established 01/18/18  -MS      Transfer Training PT LTG, Time to Achieve by discharge  -MS      Transfer Training PT LTG, Activity Type sit to stand/stand to sit  -MS      Transfer Training PT LTG, [place] Level conditional independence  -MS      Transfer Training PT LTG, Assist Device walker, rolling  -MS      Gait Training PT LTG    Gait Training Goal PT LTG, Date Established 01/18/18  -MS      Gait Training Goal PT LTG, Time to Achieve by discharge  -MS      Gait Training Goal PT LTG, [place] Level contact guard assist  -MS      Gait Training Goal PT LTG, Assist Device walker, rolling  -MS      Gait  Training Goal PT LTG, Distance to Achieve 50ft clearing L foot 50% of the time  -MS      Stair Training PT LTG    Stair Training Goal PT LTG, Date Established 01/18/18  -MS      Stair Training Goal PT LTG, Time to Achieve by discharge  -MS      Stair Training Goal PT LTG, Number of Steps 3  -MS      Stair Training Goal PT LTG, [place] contact guard assist  -MS      Stair Training Goal PT LTG, Assist Device 1 handrail  -MS      Strength Goal PT LTG    Strength Goal PT LTG, Date Established 01/18/18  -MS      Strength Goal PT LTG, Time to Achieve by discharge  -MS      Strength Goal PT LTG, Functional Goal pt will demonstrate full AROM of left hip flexion  -MS        User Key  (r) = Recorded By, (t) = Taken By, (c) = Cosigned By    Initials Name Provider Type    MS Nicole Olson, PT DPT Physical Therapist                Outcome Measures       01/18/18 0841 01/18/18 0739       How much help from another person do you currently need...    Turning from your back to your side while in flat bed without using bedrails?  3  -MS     Moving from lying on back to sitting on the side of a flat bed without bedrails?  3  -MS     Moving to and from a bed to a chair (including a wheelchair)?  3  -MS     Standing up from a chair using your arms (e.g., wheelchair, bedside chair)?  3  -MS     Climbing 3-5 steps with a railing?  1  -MS     To walk in hospital room?  1  -MS     AM-PAC 6 Clicks Score  14  -MS     How much help from another is currently needed...    Putting on and taking off regular lower body clothing? 2  -AC      Bathing (including washing, rinsing, and drying) 2  -AC      Toileting (which includes using toilet bed pan or urinal) 3  -AC      Putting on and taking off regular upper body clothing 4  -AC      Taking care of personal grooming (such as brushing teeth) 3  -AC      Eating meals 4  -AC      Score 18  -AC      Functional Assessment    Outcome Measure Options AM-PAC 6 Clicks Daily Activity (OT)  -AC  AM-PAC 6 Clicks Basic Mobility (PT)  -MS       User Key  (r) = Recorded By, (t) = Taken By, (c) = Cosigned By    Initials Name Provider Type    AC Sebastián Howe, OTR/L Occupational Therapist    MS Nicole Olson, PT DPT Physical Therapist           Time Calculation:         PT Charges       18 1138          Time Calculation    Start Time 0739  -MS      Stop Time 0825  -MS      Time Calculation (min) 46 min  -MS      PT Received On 18  -MS      PT Goal Re-Cert Due Date 18  -MS        User Key  (r) = Recorded By, (t) = Taken By, (c) = Cosigned By    Initials Name Provider Type    MS Nicole Olson, PT DPT Physical Therapist          Therapy Charges for Today     Code Description Service Date Service Provider Modifiers Qty    40430726966 HC PT MOBILITY CURRENT 2018 Nicole Olson, PT DPT GP, CK 1    [id number] HC PT MOBILITY PROJECTED 2018 Nicole Olson, PT DPT GP, CJ 1    [id number] HC PT EVAL MOD COMPLEXITY 4 2018 Nicole Olson, PT DPT GP, KX 1          PT G-Codes  Outcome Measure Options: AM-PAC 6 Clicks Daily Activity (OT)  Score: 14  Functional Limitation: Mobility: Walking and moving around  Mobility: Walking and Moving Around Current Status (): At least 40 percent but less than 60 percent impaired, limited or restricted  Mobility: Walking and Moving Around Goal Status (): At least 20 percent but less than 40 percent impaired, limited or restricted      Nicole Olson PT DPT  2018          Electronically signed by Nicole Olson PT DPT at 2018 11:39 AM           Occupational Therapy Notes (most recent note)      Sebastián Howe, OTR/L at 2018  8:50 AM  Version 1 of 1         Acute Care - Occupational Therapy Initial Evaluation  [facility]     Patient Name: Tawny Shin  : 1955  MRN: [id number]  Today's Date: 2018  Onset of Illness/Injury or Date of Surgery Date: (P) 18  Date of Referral to OT: 18  Referring  Physician: (P) Dr. Oropeza    Admit Date: 1/17/2018       ICD-10-CM ICD-9-CM   1. Impaired mobility and ADLs Z74.09 799.89     Patient Active Problem List   Diagnosis   • Eustachian tube dysfunction   • Sensorineural hearing loss   • Lumbago of multiple sites in spine with sciatica   • Spondylolisthesis of lumbar region     Past Medical History:   Diagnosis Date   • Arthritis    • Asthma    • Chronic sinusitis    • Eustachian tube dysfunction    • Heart disease    • Herpes simplex    • Hypertension    • Laryngitis sicca    • Laryngitis, chronic    • MI (myocardial infarction)    • Otorrhea    • Sensorineural hearing loss    • Sjogren's disease      Past Surgical History:   Procedure Laterality Date   • A-V CARDIAC PACEMAKER INSERTION  2016   • ATRIAL CARDIAC PACEMAKER INSERTION     • CORONARY ANGIOPLASTY WITH STENT PLACEMENT      X 2; 2013 & 2014   • MYRINGOTOMY W/ TUBES  09/04/2014    TUBES NO LONGER IN PLACE          OT ASSESSMENT FLOWSHEET (last 72 hours)      OT Evaluation       01/18/18 0739 01/18/18 0738 01/17/18 1744 01/17/18 1743 01/17/18 1742    Rehab Evaluation    Document Type (P)  evaluation  -MS evaluation  -AC       Subjective Information (P)  agree to therapy;complains of;weakness;pain   L hip flex weakness  -MS agree to therapy;complains of;pain  -AC       Symptoms Noted Comment  LLE buddy frequently with WBing/ambulation  -AC       General Information    Patient Profile Review (P)  yes  -MS yes  -AC       Onset of Illness/Injury or Date of Surgery Date (P)  01/17/18  -MS 01/17/18  -AC       Referring Physician (P)  Dr. Oropeza  -MS Dr. Oropeza  -AC       General Observations (P)  pt lying in bed, awake and alert in no apparent distress  -MS lying supine in bed, no apparent distress, 3L O2 per nc  -AC       Pertinent History Of Current Problem (P)  R sided back pain, buttock and leg pain; Dx: lumbar stenosis L3-5, DDD, lumbar radiculopathy, L4-5 disc herniation w/extruded fragment, spondylolisthesis L3-5  s/p L sided LLIF via retroperitoneal approach on 1/17/18  -MS R sided back pain, buttock and leg pain; Dx: lumbar stenosis L3-5, DDD, lumbar radiculopathy, L4-5 disc herniation w/extruded fragment, spondylolisthesis L3-5 s/p L sided LLIF via retroperitoneal approach on 1/17/18 (XR L spine)  -AC       Precautions/Limitations (P)  brace on when up;fall precautions;spinal precautions  -MS brace on when up;spinal precautions  -AC       Prior Level of Function  independent:;all household mobility;community mobility;gait;transfer;ADL's;home management;cooking;cleaning;driving;shopping  -AC       Equipment Currently Used at Home  cane, straight;walker, rolling;raised toilet  -AC  none  -AB     Plans/Goals Discussed With (P)  patient;agreed upon  -MS patient;agreed upon  -AC       Risks Reviewed (P)  patient:;LOB;nausea/vomiting;dizziness;increased discomfort  -MS patient:;LOB;nausea/vomiting;dizziness;increased discomfort;change in vital signs;increased drainage;lines disloged  -AC       Benefits Reviewed (P)  patient:;improve function;increase independence;increase strength;increase balance;decrease pain;increase knowledge  -MS patient:;improve function;increase independence;increase strength;increase balance;decrease pain;decrease risk of DVT;improve skin integrity;increase knowledge  -AC       Barriers to Rehab (P)  physical barrier  -MS none identified  -AC       Living Environment    Lives With  spouse  -AC spouse  -AB      Living Arrangements  house  -AC house  -AB      Home Accessibility  bed and bath on same level;stairs to enter home;stairs within home;other (see comments)   walk in shower  -AC no concerns  -AB      Number of Stairs to Enter Home  2  -AC       Number of Stairs Within Home  11  -AC       Stair Railings at Home  inside, present on left side  -AC none  -AB      Type of Financial/Environmental Concern   none  -AB      Transportation Available   car  -AB      Living Environment Comment  spouse  available evenings  -       Clinical Impression    Date of Referral to OT  01/17/18  -AC       OT Diagnosis  decreased ADL  -AC       Prognosis  good  -AC       Impairments Found (describe specific impairments)  motor function;muscle performance;gait, locomotion, and balance;ergonomics and body mechanics  -       Criteria for Skilled Therapeutic Interventions Met  yes;treatment indicated  -AC       Rehab Potential  good, to achieve stated therapy goals  -AC       Therapy Frequency  3-5 times/wk  -AC       Predicted Duration of Therapy Intervention (days/wks)  until discharge  -AC       Anticipated Discharge Disposition  home with assist  -AC       Functional Level Prior    Ambulation     0-->independent  -AB    Transferring     0-->independent  -AB    Toileting     0-->independent  -AB    Bathing     0-->independent  -AB    Dressing     0-->independent  -AB    Eating     0-->independent  -AB    Communication     0-->understands/communicates without difficulty  -AB    Swallowing     0-->swallows foods/liquids without difficulty  -AB    Vital Signs    Pre Systolic BP Rehab  130  -AC       Pre Treatment Diastolic BP  66  -AC       Pre SpO2 (%) (P)  99  -MS 99  -AC       O2 Delivery Pre Treatment (P)  supplemental O2   3L  -MS supplemental O2   3L  -AC       Intra SpO2 (%) (P)  94  -MS        O2 Delivery Intra Treatment (P)  room air  -MS        Post SpO2 (%) (P)  97  -MS        O2 Delivery Post Treatment (P)  room air  -MS        Pre Patient Position (P)  Supine  -MS Supine  -AC       Intra Patient Position (P)  Sitting  -MS        Post Patient Position (P)  Sitting  -MS        Pain Assessment    Pain Assessment (P)  0-10  -MS 0-10  -AC       Pain Score (P)  4  -MS 4  -AC       Pain Type (P)  Acute pain;Surgical pain  -MS Acute pain;Surgical pain  -AC       Pain Location (P)  Back  -MS Back  -AC       Pain Orientation (P)  Left  -MS Left  -AC       Pain Radiating Towards (P)  L flank, L thigh  -MS L flank, L thigh   -AC       Pain Descriptors (P)  Aching;Throbbing  -MS Aching;Throbbing  -AC       Pain Intervention(s) (P)  Medication (See MAR);Repositioned;Ambulation/increased activity  -MS Medication (See MAR);Repositioned;Ambulation/increased activity  -AC       Response to Interventions (P)  reports increase in pain  -MS tolerated  -AC       Cognitive Assessment/Intervention    Current Cognitive/Communication Assessment (P)  functional  -MS functional  -AC       Orientation Status (P)  oriented x 4  -MS oriented x 4  -AC       Follows Commands/Answers Questions (P)  100% of the time;able to follow single-step instructions  -% of the time;able to follow multi-step instructions  -AC       Personal Safety (P)  WNL/WFL  -MS WNL/WFL  -AC       Personal Safety Interventions (P)  fall prevention program maintained;gait belt;muscle strengthening facilitated;nonskid shoes/slippers when out of bed;supervised activity  -MS fall prevention program maintained;gait belt;nonskid shoes/slippers when out of bed;supervised activity;elopement precautions initiated  -AC       ROM (Range of Motion)    General ROM (P)  lower extremity range of motion deficits identified  -MS        General ROM Detail  WFL AROM BUE  -AC       MMT (Manual Muscle Testing)    General MMT Assessment (P)  lower extremity strength deficits identified  -MS        General MMT Assessment Detail  functionally 4+/5 BUE  -AC       Bed Mobility, Assessment/Treatment    Bed Mobility, Assistive Device (P)  bed rails  -MS bed rails  -AC       Bed Mobility, Roll Left, [place] (P)  contact guard assist;verbal cues required;nonverbal cues required (demo/gesture)  -MS        Bed Mobility, Roll Right, [place]  supervision required;verbal cues required  -AC       Bed Mob, Sidelying to Sit, [place] (P)  minimum assist (75% patient effort);verbal cues required;nonverbal cues required (demo/gesture)   min A for L LE for hip flexion  -MS contact guard assist;verbal cues  required  -AC       Bed Mobility, Impairments (P)  strength decreased  -MS pain  -AC       Transfer Assessment/Treatment    Transfers, Bed-Chair [place] (P)  minimum assist (75% patient effort);2 person assist required;verbal cues required;nonverbal cues required (demo/gesture)   bed to BSC  -MS        Transfers, Chair-Bed [place] (P)  minimum assist (75% patient effort);2 person assist required;verbal cues required;nonverbal cues required (demo/gesture)   BSC to chair  -MS        Transfers, Sit-Stand [place] (P)  minimum assist (75% patient effort);2 person assist required;verbal cues required;nonverbal cues required (demo/gesture)  -MS minimum assist (75% patient effort);2 person assist required;verbal cues required  -AC       Transfers, Stand-Sit [place] (P)  minimum assist (75% patient effort);2 person assist required;verbal cues required;nonverbal cues required (demo/gesture)  -MS minimum assist (75% patient effort);verbal cues required  -AC       Transfers, Sit-Stand-Sit, Assist Device (P)  rolling walker   once HHA x2 and once with RW  -MS rolling walker  -AC       Transfer, Safety Issues (P)  knees buckling;step length decreased;weight-shifting ability decreased   L leg buckling  -MS knees buckling;other (see comments)   L knee  -AC       Transfer, Impairments (P)  ROM decreased;sensation decreased;strength decreased;impaired balance;motor control impaired;pain  -MS strength decreased  -AC       Transfer, Comment (P)  pt required blocking of her L leg to prevent buckling during her first sit to stand. She was able to use a walker for her second sit to stand without leg blocking  -MS        Functional Mobility    Functional Mobility- Ind. Level  minimum assist (75% patient effort);2 person assist required;verbal cues required  -AC       Functional Mobility- Device  rolling walker  -AC       Functional Mobility- Comment  L knee buddy with ambulation/WBing  -AC       Lower Body Dressing  Assessment/Training    LB Dressing Assess/Train, Clothing Type  donning:;socks   undergarment  -       LB Dressing Assess/Train, Position  sitting  -       LB Dressing Assess/Train, [place]  moderate assist (50% patient effort);verbal cues required  -       LB Dressing Assess/Train, Impairments  strength decreased  -       LB Dressing Assess/Train, Comment  impairment primarily due to L knee buckling upon standing  -       Toileting Assessment/Training    Toileting Assess/Train, Assistive Device  bedside commode  -       Toileting Assess/Train, Position  sitting;supported standing  -       Toileting Assess/Train, Indepen Level  contact guard assist  -       Toileting Assess/Train, Impairments  strength decreased  -       Toileting Assess/Train, Comment  Denis toileting, CGA for hygiene while pt completed half stand  -       Motor Skills/Interventions    Additional Documentation (P)  Balance Skills Training (Group)  -MS Balance Skills Training (Group)  -       Balance Skills Training    Sitting-Level of Assistance  Independent  -       Sitting-Balance Support  Feet supported  -       Standing-Level of Assistance  Contact guard;x2  -       Static Standing Balance Support  assistive device  -       Standing Balance # of Minutes  able to stand with CGAx2 when using rw for UE suppot  -       Gait Balance-Level of Assistance  Minimum assistance;x2  -       Gait Balance Support  assistive device  -       Orthotics Prosthetics    Additional Documentation  Orthosis Location (Group);Orthosis Management/Training (Group)  -       Orthosis Location    Orthosis Location/Type  neck/back  -       Orthosis, Neck/Back  LSO (lumbar sacral orthosis)  -       Orthosis Management/Training    Orthosis Fabrication Detail  pt already owns LSO but does not have it with her for eval, family plans to bring it this morning  -       Orthosis Skills Training  activity  limitations;restrictions/precautions  -       Sensory Assessment/Intervention    Sensory Impairment  numbness   c/o numbness in L calf once up EOB  -       General Therapy Interventions    Planned Therapy Interventions  activity intolerance;adaptive equipment training;ADL retraining;balance training;bed mobility training;orthotic fitting/training;transfer training  -       Positioning and Restraints    Pre-Treatment Position  in bed  -       Post Treatment Position  chair  -       In Chair  sitting;call light within reach;encouraged to call for assist;with family/caregiver  -       General LE Assessment    ROM Detail (P)  L hip flexion impaired 75%  -MS        Lower Extremity    Lower Ext Manual Muscle Testing Detail (P)  L hip flexion 2/5  -MS          User Key  (r) = Recorded By, (t) = Taken By, (c) = Cosigned By    Initials Name Effective Dates     Sebastián Howe, OTR/L 06/22/15 -     MS Nicole Olson, PT DPT 08/02/16 -     AB Ximena Resendez, RN 08/02/16 -            Occupational Therapy Education     Title: PT OT SLP Therapies (Done)     Topic: Occupational Therapy (Done)     Point: ADL training (Done)    Description: Instruct learner(s) on proper safety adaptation and remediation techniques during self care or transfers.   Instruct in proper use of assistive devices.    Learning Progress Summary    Learner Readiness Method Response Comment Documented by Status   Patient Acceptance E VU OT POC, safe transfers, dressing weaker LE first  01/18/18 0842 Done               Point: Precautions (Done)    Description: Instruct learner(s) on prescribed precautions during self-care and functional transfers.    Learning Progress Summary    Learner Readiness Method Response Comment Documented by Status   Patient Acceptance E VU OT POC, safe transfers, dressing weaker LE first  01/18/18 0842 Done               Point: Body mechanics (Done)    Description: Instruct learner(s) on proper positioning and spine  alignment during self-care, functional mobility activities and/or exercises.    Learning Progress Summary    Learner Readiness Method Response Comment Documented by Status   Patient Acceptance E VU OT POC, safe transfers, dressing weaker LE first  01/18/18 0842 Done                      User Key     Initials Effective Dates Name Provider Type Discipline     06/22/15 -  Sebastián Howe, OTR/L Occupational Therapist OT                  OT Recommendation and Plan  Anticipated Discharge Disposition: home with assist  Planned Therapy Interventions: activity intolerance, adaptive equipment training, ADL retraining, balance training, bed mobility training, orthotic fitting/training, transfer training  Therapy Frequency: 3-5 times/wk  Plan of Care Review  Plan Of Care Reviewed With: patient  Progress: no change  Outcome Summary/Follow up Plan: OT eval completed.  Pt rolls L with CGA and verbal cues with use of bed rail, able to come into sitting with CGA.  ModA needed for LB dressing, CGA for hygiene after toileting using BSC.  Pt transfers with minAx2 with severe buckling of LLE while WBing and ambulating.  Pt required use of rw to support herself and decrease risk of falls.  She transferred to BSC and then to chair with minAx2.  Pt does better if she's given the opportunity to transfer toward her R.  Pt requires skilled OT to address decreased ADL function, imbalance, weakness, decreased safety and knowledge deficit related to spine surgery.  She already owns LSO however she did not currently have it available during eval.  Plans to bring it later today.  2nd stage sx also planned for 1/19/18.  Anticipate discharge home with 24/7 supervision.  Pt would benefit from shower chair and possible AE at discharge.           OT Goals       01/18/18 0843          Transfer Training OT LTG    Transfer Training OT LTG, Date Established 01/18/18  -AC      Transfer Training OT LTG, Time to Achieve by discharge  -AC      Transfer  Training OT LTG, Activity Type shower chair;walk-in shower;toilet  -AC      Transfer Training OT LTG, [place] Level contact guard assist  -AC      Transfer Training OT LTG, Assist Device walker, rolling;commode, bedside;shower chair  -AC      Bathing OT LTG    Bathing Goal OT LTG, Date Established 01/18/18  -AC      Bathing Goal OT LTG, Time to Achieve by discharge  -AC      Bathing Goal OT LTG, Activity Type lower body bathing  -AC      Bathing Goal OT LTG, [place] Level supervision required  -AC      Bathing Goal OT LTG, Assist Device shower chair  -AC      LB Dressing OT LTG    LB Dressing Goal OT LTG, Date Established 01/18/18  -AC      LB Dressing Goal OT LTG, Time to Achieve by discharge  -AC      LB Dressing Goal OT LTG, [place] Level contact guard assist  -AC      LB Dressing Goal OT LTG, Additional Goal AE PRN  -AC        User Key  (r) = Recorded By, (t) = Taken By, (c) = Cosigned By    Initials Name Provider Type    TABITHA Howe OTR/L Occupational Therapist                Outcome Measures       01/18/18 0841          How much help from another is currently needed...    Putting on and taking off regular lower body clothing? 2  -AC      Bathing (including washing, rinsing, and drying) 2  -AC      Toileting (which includes using toilet bed pan or urinal) 3  -AC      Putting on and taking off regular upper body clothing 4  -AC      Taking care of personal grooming (such as brushing teeth) 3  -AC      Eating meals 4  -AC      Score 18  -AC      Functional Assessment    Outcome Measure Options AM-PAC 6 Clicks Daily Activity (OT)  -AC        User Key  (r) = Recorded By, (t) = Taken By, (c) = Cosigned By    Initials Name Provider Type    TABITHA Howe OTR/L Occupational Therapist          Time Calculation:   OT Start Time: 0738  OT Stop Time: 0835  OT Time Calculation (min): 57 min    Therapy Charges for Today     Code Description Service Date Service Provider Modifiers Qty     41478073015 HC OT SELFCARE CURRENT 1/18/2018 Sebastián Howe OTR/L GO, CK 1    15619985783 HC OT SELFCARE PROJECTED 1/18/2018 Sebastián Howe OTR/L GO, CJ 1    55853707959 HC OT EVAL MOD COMPLEXITY 4 1/18/2018 Sebastián oHwe OTR/L GO, KX 1          OT G-codes  OT Professional Judgement Used?: Yes  OT Functional Scales Options: AM-PAC 6 Clicks Daily Activity (OT)  Score: 18  Functional Limitation: Self care  Self Care Current Status (): At least 40 percent but less than 60 percent impaired, limited or restricted  Self Care Goal Status (): At least 20 percent but less than 40 percent impaired, limited or restricted    KATHYA Edward/L  1/18/2018     Electronically signed by KATHYA Hidalgo/L at 1/18/2018  8:50 AM        Speech Language Pathology Notes (most recent note)     No notes of this type exist for this encounter.        Respiratory Therapy Notes (most recent note)     No notes of this type exist for this encounter.

## 2018-01-20 NOTE — PLAN OF CARE
Problem: Patient Care Overview (Adult)  Goal: Plan of Care Review  Outcome: Ongoing (interventions implemented as appropriate)   01/20/18 2277   Coping/Psychosocial Response Interventions   Plan Of Care Reviewed With patient   Patient Care Overview   Progress progress toward functional goals as expected   Outcome Evaluation   Outcome Summary/Follow up Plan Patient completed physical therapy. She transfers with CGA x2 and ambulated 60' with rolling walker and CGA x 2. She continues to wear knee immobilizer during gait and she can not clear her left foot during gait due to LLE weakness. She performed bed mobility with min A x 2.   We will continue to work with patient to increased mobility.

## 2018-01-20 NOTE — THERAPY TREATMENT NOTE
Acute Care - Physical Therapy Treatment Note  [facility]     Patient Name: Tawny Shin  : 1955  MRN: [id number]  Today's Date: 2018  Onset of Illness/Injury or Date of Surgery Date: 18  Date of Referral to PT: 18  Referring Physician: Dr. Oropeza    Admit Date: 2018    Visit Dx:    ICD-10-CM ICD-9-CM   1. Impaired mobility and ADLs Z74. 799.89   2. Impaired mobility Z74. 799.89     Patient Active Problem List   Diagnosis   • Eustachian tube dysfunction   • Sensorineural hearing loss   • Lumbago of multiple sites in spine with sciatica   • Spondylolisthesis of lumbar region               Adult Rehabilitation Note       18 1536 18 1503       Rehab Assessment/Intervention    Discipline physical therapy assistant  -KINGS physical therapy assistant  -NW     Document Type therapy note (daily note)  -KINGS therapy note (daily note)  -NW     Subjective Information agree to therapy;complains of;pain  -KINGS agree to therapy;complains of;weakness;pain  -NW     Patient Effort, Rehab Treatment good  -KINGS good  -NW     Precautions/Limitations brace on when up;fall precautions;spinal precautions;other (see comments)   L knee buckling- knee immobilizer  -KINGS brace on when up;fall precautions;spinal precautions  -NW     Specific Treatment Considerations  LLE buddy cues to lock knee when amb  -NW     Recorded by [KINGS] Godfrey Tierney PTA [NW] Angle Nagel PTA     Pain Assessment    Pain Assessment 0-10  -KINGS 0-10  -NW     Pain Score 4  -KINGS 5  -NW     Post Pain Score 5  -KINGS      Pain Type Acute pain;Surgical pain  -KINGS Acute pain;Surgical pain  -NW     Pain Location Back  -KINGS Incision  -NW     Pain Orientation  Left  -NW     Pain Descriptors Aching  -KINGS Aching;Radiating;Throbbing  -NW     Pain Intervention(s) Repositioned;Ambulation/increased activity  -KINGS Repositioned;Medication (See MAR)  -NW     Response to Interventions tolerated  -KINGS      Recorded by [KINGS] Godfrey Tierney PTA [NW] Angle  SOILA Nagel PTA     Bed Mobility, Assessment/Treatment    Bed Mob, Sit to Supine, [place] verbal cues required;minimum assist (75% patient effort);2 person assist required  -KINGS      Bed Mobility, Impairments strength decreased  -KINGS      Bed Mobility, Comment  up in chair  -NW     Recorded by [KINGS] Godfrey Tierney PTA [NW] Angle Nagel PTA     Transfer Assessment/Treatment    Transfers, Sit-Stand [place] verbal cues required;contact guard assist;2 person assist required  -KINGS verbal cues required;contact guard assist;minimum assist (75% patient effort)  -NW     Transfers, Stand-Sit [place] verbal cues required;minimum assist (75% patient effort)  -KINGS verbal cues required;minimum assist (75% patient effort);2 person assist required  -NW     Transfers, Sit-Stand-Sit, Assist Device rolling walker  -KINGS rolling walker  -NW     Transfer, Safety Issues  knees buckling;step length decreased;weight-shifting ability decreased  -NW     Transfer, Impairments strength decreased  -KINGS ROM decreased;strength decreased;pain  -NW     Transfer, Comment  pt able to lock L knee herself to prevent knee from buckling during amb  -NW     Recorded by [KINGS] Godfrey Tierney PTA [NW] Angle Nagel PTA     Gait Assessment/Treatment    Gait, [place] Level verbal cues required;contact guard assist;2 person assist required  -KINGS contact guard assist;1 person + 1 person to manage equipment  -NW     Gait, Assistive Device rolling walker  -KINGS rolling walker  -NW     Gait, Distance (Feet) 60  -KINGS 10   10x2  -NW     Gait, Gait Deviations left:;step length decreased;weight-shifting ability decreased;toe-to-floor clearance decreased   difficulty advancing LLE due to weakness/numbness   -KINGS rush decreased;step length decreased;stride length decreased;left:;knee buckling  -NW     Gait, Safety Issues step length decreased;weight-shifting ability decreased  -KINGS step length decreased;weight-shifting ability decreased  -NW     Gait,  Impairments strength decreased  -KINGS pain;strength decreased;sensation decreased  -NW     Gait, Comment her knee immobilizer slid down during gait  -KINGS      Recorded by [KINGS] Godfrey Tierney PTA [NW] Angle Nagel PTA     Therapy Exercises    Right Lower Extremity AROM:;15 reps;sitting;ankle pumps/circles;LAQ  -KINGS      Left Lower Extremity AROM:;20 reps;sitting;ankle pumps/circles  -KINGS AROM:;AAROM:;10 reps;hip abduction/adduction;LAQ;sitting;hip flexion  -NW     Recorded by [KINGS] Godfrey Tierney PTA [NW] Angle Nagel PTA     Orthotics Prosthetics    Additional Documentation Orthosis Location (Group);Orthosis Management/Training (Group)  -KINGS Orthosis Location (Group)  -NW     Recorded by [KINGS] Godfrey Tierney PTA [NW] Angle Nagel PTA     Orthosis Location    Orthosis Location/Type neck/back;lower extremity  -KINGS neck/back  -NW     Orthosis, Neck/Back LSO (lumbar sacral orthosis);other (see comments)  -KINGS LSO (lumbar sacral orthosis)  -NW     Orthosis, Lower Extremity Left:;knee immobilizer  -KINGS      Recorded by [KINGS] Godfrey Tierney PTA [NW] Angle Nagel PTA     Orthosis Management/Training    Orthosis Indications immobilize, protect/position healing structures;rest, reduce inflammation;rest, reduce pain;restrict motion  -KINGS      Orthosis Skills Training activity limitations;restrictions/precautions  -KINGS      Orthosis Wear Schedule wear when out of bed only;remove for hygiene/bathing  -KINGS      Recorded by [KINGS] Godfrey Tierney PTA      Positioning and Restraints    Pre-Treatment Position sitting in chair/recliner  -KINGS sitting in chair/recliner  -NW     Post Treatment Position bed  -KINGS chair  -NW     In Bed fowlers;call light within reach;encouraged to call for assist;SCD pump applied  -KINGS      In Chair  sitting;call light within reach;encouraged to call for assist;with family/caregiver  -NW     Recorded by [KINGS] Godfrey Tierney PTA [NW] Angle Nagel PTA       User Key  (r) = Recorded By, (t) =  Taken By, (c) = Cosigned By    Initials Name Effective Dates    NW Angle CM Robe, PTA 08/02/16 -     KINGS Godfrey Tierney, PTA 08/02/16 -                 IP PT Goals       01/20/18 1302 01/18/18 1133       Bed Mobility PT LTG    Bed Mobility PT LTG, Date Established 01/20/18  -KR 01/18/18  -MS     Bed Mobility PT LTG, Time to Achieve  by discharge  -MS     Bed Mobility PT LTG, Activity Type  all bed mobility  -MS     Bed Mobility PT LTG, [place] Level  independent  -MS     Transfer Training PT LTG    Transfer Training PT LTG, Date Established 01/20/18  -KR 01/18/18  -MS     Transfer Training PT LTG, Time to Achieve  by discharge  -MS     Transfer Training PT LTG, Activity Type  sit to stand/stand to sit  -MS     Transfer Training PT LTG, [place] Level  conditional independence  -MS     Transfer Training PT LTG, Assist Device  walker, rolling  -MS     Gait Training PT LTG    Gait Training Goal PT LTG, Date Established 01/20/18  -KR 01/18/18  -MS     Gait Training Goal PT LTG, Time to Achieve  by discharge  -MS     Gait Training Goal PT LTG, [place] Level  contact guard assist  -MS     Gait Training Goal PT LTG, Assist Device  walker, rolling  -MS     Gait Training Goal PT LTG, Distance to Achieve  50ft clearing L foot 50% of the time  -MS     Stair Training PT LTG    Stair Training Goal PT LTG, Date Established 01/20/18  -KR 01/18/18  -MS     Stair Training Goal PT LTG, Time to Achieve  by discharge  -MS     Stair Training Goal PT LTG, Number of Steps  3  -MS     Stair Training Goal PT LTG, [place] Level  contact guard assist  -MS     Stair Training Goal PT LTG, Assist Device  1 handrail  -MS     Strength Goal PT LTG    Strength Goal PT LTG, Date Established 01/20/18  -KR 01/18/18  -MS     Strength Goal PT LTG, Time to Achieve  by discharge  -MS     Strength Goal PT LTG, Functional Goal  pt will demonstrate full AROM of left hip flexion  -MS       User Key  (r) = Recorded By, (t) = Taken By, (c)  = Cosigned By    Initials Name Provider Type    HERB Hale, PT DPT Physical Therapist    MS Nicole Olson, PT DPT Physical Therapist          Physical Therapy Education     Title: PT OT SLP Therapies (Active)     Topic: Physical Therapy (Active)     Point: Mobility training (Done)    Learning Progress Summary    Learner Readiness Method Response Comment Documented by Status   Patient Acceptance E VU,DU Educated on bed mobility with spinal precautions. KINGS 01/20/18 1558 Done    Acceptance E,D VU knee immobilizer wear, posture, changing positions while awake KR 01/20/18 1301 Done    Acceptance E VU spinal precautions, exercises for L hip flexion, use of brace once available MS 01/18/18 0854 Done   Family Acceptance E,D VU knee immobilizer wear, posture, changing positions while awake KR 01/20/18 1301 Done               Point: Body mechanics (Done)    Learning Progress Summary    Learner Readiness Method Response Comment Documented by Status   Patient Acceptance E,D VU knee immobilizer wear, posture, changing positions while awake KR 01/20/18 1301 Done   Family Acceptance E,D VU knee immobilizer wear, posture, changing positions while awake KR 01/20/18 1301 Done               Point: Precautions (Done)    Learning Progress Summary    Learner Readiness Method Response Comment Documented by Status   Patient Acceptance E,D VU knee immobilizer wear, posture, changing positions while awake KR 01/20/18 1301 Done    Acceptance E VU spinal precautions, exercises for L hip flexion, use of brace once available MS 01/18/18 0854 Done   Family Acceptance E,D VU knee immobilizer wear, posture, changing positions while awake KR 01/20/18 1301 Done                      User Key     Initials Effective Dates Name Provider Type Discipline     06/19/15 -  Yue Hale, PT DPT Physical Therapist PT    MS 08/02/16 -  Nicole Olson, PT DPT Physical Therapist PT    KINGS 08/02/16 -  Godfrey Tierney, SERENA Physical Therapy  Assistant PT                    PT Recommendation and Plan  Anticipated Discharge Disposition: inpatient rehabilitation facility  Planned Therapy Interventions: balance training, bed mobility training, gait training, home exercise program, lumbar stabilization, motor coordination training, neuromuscular re-education, orthotic fitting/training, patient/family education, postural re-education, ROM (Range of Motion), stair training, strengthening, stretching, swiss ball techniques, transfer training  PT Frequency: 2 times/day, per priority policy  Plan of Care Review  Plan Of Care Reviewed With: patient  Progress: progress toward functional goals as expected  Outcome Summary/Follow up Plan: Patient completed physical therapy.  She transfers with  CGA x2 and ambulated 60' with rolling walker and CGA x 2.  She continues to wear knee immobilizer during gait and she can not clear her left foot during gait .  We will continue to work with patient to increased mobility.          Outcome Measures       01/20/18 1536 01/20/18 1300 01/20/18 1115    How much help from another person do you currently need...    Turning from your back to your side while in flat bed without using bedrails? 3  -KINGS 3  -KR     Moving from lying on back to sitting on the side of a flat bed without bedrails? 3  -KINGS 3  -KR     Moving to and from a bed to a chair (including a wheelchair)? 3  -KINGS 2  -KR     Standing up from a chair using your arms (e.g., wheelchair, bedside chair)? 3  -KINGS 2  -KR     Climbing 3-5 steps with a railing? 1  -KINGS 1  -KR     To walk in hospital room? 3  -KINGS 3  -KR     AM-PAC 6 Clicks Score 16  -KINGS 14  -KR     How much help from another is currently needed...    Putting on and taking off regular lower body clothing?   2  -AC    Bathing (including washing, rinsing, and drying)   2  -AC    Toileting (which includes using toilet bed pan or urinal)   2  -AC    Putting on and taking off regular upper body clothing   3  -AC    Taking  care of personal grooming (such as brushing teeth)   3  -AC    Eating meals   4  -AC    Score   16  -AC    Functional Assessment    Outcome Measure Options AM-PAC 6 Clicks Basic Mobility (PT)  -KINGS AM-PAC 6 Clicks Basic Mobility (PT)  -KR AM-PAC 6 Clicks Daily Activity (OT)  -AC      01/18/18 0841 01/18/18 0739       How much help from another person do you currently need...    Turning from your back to your side while in flat bed without using bedrails?  3  -MS     Moving from lying on back to sitting on the side of a flat bed without bedrails?  3  -MS     Moving to and from a bed to a chair (including a wheelchair)?  3  -MS     Standing up from a chair using your arms (e.g., wheelchair, bedside chair)?  3  -MS     Climbing 3-5 steps with a railing?  1  -MS     To walk in hospital room?  1  -MS     AM-PAC 6 Clicks Score  14  -MS     How much help from another is currently needed...    Putting on and taking off regular lower body clothing? 2  -AC      Bathing (including washing, rinsing, and drying) 2  -AC      Toileting (which includes using toilet bed pan or urinal) 3  -AC      Putting on and taking off regular upper body clothing 4  -AC      Taking care of personal grooming (such as brushing teeth) 3  -AC      Eating meals 4  -AC      Score 18  -AC      Functional Assessment    Outcome Measure Options AM-PAC 6 Clicks Daily Activity (OT)  -AC AM-PAC 6 Clicks Basic Mobility (PT)  -MS       User Key  (r) = Recorded By, (t) = Taken By, (c) = Cosigned By    Initials Name Provider Type    AC Sebastián Howe, OTR/L Occupational Therapist    HERB Hale, PT DPT Physical Therapist    MS Nicole Olson, PT DPT Physical Therapist    KINGS Tierney, PTA Physical Therapy Assistant           Time Calculation:         PT Charges       01/20/18 1602 01/20/18 1309       Time Calculation    Start Time 1536  -KINGS 1030  -KR     Stop Time 1554  -KINGS 1130  -KR     Time Calculation (min) 18 min  -KINGS 60 min  -KR     PT  Received On 01/20/18  -KINGS 01/20/18  -KR     PT Goal Re-Cert Due Date 01/30/18  -KINGS 01/30/18  -KR     Time Calculation- PT    Total Timed Code Minutes- PT 18 minute(s)  -KINGS 30 minute(s)  -KR       User Key  (r) = Recorded By, (t) = Taken By, (c) = Cosigned By    Initials Name Provider Type    HERB Hale, PT DPT Physical Therapist    KINGS Tierney PTA Physical Therapy Assistant          Therapy Charges for Today     Code Description Service Date Service Provider Modifiers Qty    [id number] HC GAIT TRAINING EA 15 MIN 1/20/2018 Godfrey Tierney PTA GP, KX 1          PT G-Codes  PT Professional Judgement Used?: Yes  Outcome Measure Options: AM-PAC 6 Clicks Basic Mobility (PT)  Score: 14  Functional Limitation: Mobility: Walking and moving around  Mobility: Walking and Moving Around Current Status (): At least 60 percent but less than 80 percent impaired, limited or restricted  Mobility: Walking and Moving Around Goal Status (): At least 20 percent but less than 40 percent impaired, limited or restricted    Godfrey Tierney PTA  1/20/2018

## 2018-01-20 NOTE — PLAN OF CARE
Problem: Patient Care Overview (Adult)  Goal: Plan of Care Review  Outcome: Ongoing (interventions implemented as appropriate)   01/20/18 0525   Coping/Psychosocial Response Interventions   Plan Of Care Reviewed With patient   Patient Care Overview   Progress no change   Outcome Evaluation   Outcome Summary/Follow up Plan pt c/o pain in lower back; prn pain meds given with relief reported; pt rested well; VSS; safety maintained     Goal: Adult Individualization and Mutuality  Outcome: Ongoing (interventions implemented as appropriate)    Goal: Discharge Needs Assessment  Outcome: Ongoing (interventions implemented as appropriate)      Problem: Perioperative Period (Adult)  Goal: Signs and Symptoms of Listed Potential Problems Will be Absent or Manageable (Perioperative Period)  Outcome: Ongoing (interventions implemented as appropriate)   01/20/18 0525   Perioperative Period   Problems Assessed (Perioperative Period) all   Problems Present (Perioperative Period) pain       Problem: Laminectomy/Foraminotomy/Discectomy (Adult)  Goal: Signs and Symptoms of Listed Potential Problems Will be Absent or Manageable (Laminectomy/Foraminotomy/Discectomy)  Outcome: Ongoing (interventions implemented as appropriate)   01/20/18 0525   Laminectomy/Foraminotomy/Discectomy   Problems Assessed (Laminectomy/Laminotomy/Discectomy) all   Problems Present (Laminectomy/Laminotomy/Discectomy) pain;functional decline/self-care deficit;situational response       Problem: Fall Risk (Adult)  Goal: Absence of Falls  Outcome: Ongoing (interventions implemented as appropriate)   01/20/18 0525   Fall Risk (Adult)   Absence of Falls making progress toward outcome

## 2018-01-21 PROCEDURE — 25010000003 CEFAZOLIN PER 500 MG

## 2018-01-21 PROCEDURE — 97110 THERAPEUTIC EXERCISES: CPT

## 2018-01-21 PROCEDURE — 97116 GAIT TRAINING THERAPY: CPT

## 2018-01-21 PROCEDURE — 63710000001 PREDNISONE PER 5 MG

## 2018-01-21 RX ADMIN — PREDNISONE 2 MG: 1 TABLET ORAL at 09:02

## 2018-01-21 RX ADMIN — FUROSEMIDE 40 MG: 40 TABLET ORAL at 09:02

## 2018-01-21 RX ADMIN — PANTOPRAZOLE SODIUM 40 MG: 40 TABLET, DELAYED RELEASE ORAL at 09:06

## 2018-01-21 RX ADMIN — CARVEDILOL 12.5 MG: 6.25 TABLET, FILM COATED ORAL at 09:01

## 2018-01-21 RX ADMIN — CLONIDINE HYDROCHLORIDE 0.2 MG: 0.2 TABLET ORAL at 21:45

## 2018-01-21 RX ADMIN — LEVOTHYROXINE SODIUM 75 MCG: 75 TABLET ORAL at 05:34

## 2018-01-21 RX ADMIN — SPIRONOLACTONE 25 MG: 25 TABLET ORAL at 09:02

## 2018-01-21 RX ADMIN — OXYCODONE HYDROCHLORIDE AND ACETAMINOPHEN 2 TABLET: 7.5; 325 TABLET ORAL at 14:44

## 2018-01-21 RX ADMIN — CARVEDILOL 12.5 MG: 6.25 TABLET, FILM COATED ORAL at 17:08

## 2018-01-21 RX ADMIN — PREGABALIN 50 MG: 50 CAPSULE ORAL at 09:06

## 2018-01-21 RX ADMIN — CEFAZOLIN 1 G: 1 INJECTION, POWDER, FOR SOLUTION INTRAMUSCULAR; INTRAVENOUS at 02:46

## 2018-01-21 RX ADMIN — FOLIC ACID 1 MG: 1 TABLET ORAL at 09:02

## 2018-01-21 RX ADMIN — FLUTICASONE PROPIONATE 2 SPRAY: 50 SPRAY, METERED NASAL at 09:02

## 2018-01-21 RX ADMIN — CETIRIZINE HYDROCHLORIDE 10 MG: 10 TABLET, FILM COATED ORAL at 09:02

## 2018-01-21 RX ADMIN — ISOSORBIDE MONONITRATE 30 MG: 30 TABLET, EXTENDED RELEASE ORAL at 09:02

## 2018-01-21 RX ADMIN — OXYCODONE HYDROCHLORIDE AND ACETAMINOPHEN 1 TABLET: 7.5; 325 TABLET ORAL at 02:46

## 2018-01-21 RX ADMIN — OXYCODONE HYDROCHLORIDE AND ACETAMINOPHEN 2 TABLET: 7.5; 325 TABLET ORAL at 21:48

## 2018-01-21 RX ADMIN — METHYLNALTREXONE BROMIDE 12 MG: 12 INJECTION, SOLUTION SUBCUTANEOUS at 21:45

## 2018-01-21 RX ADMIN — OXYCODONE HYDROCHLORIDE AND ACETAMINOPHEN 2 TABLET: 7.5; 325 TABLET ORAL at 09:06

## 2018-01-21 RX ADMIN — CEFAZOLIN 1 G: 1 INJECTION, POWDER, FOR SOLUTION INTRAMUSCULAR; INTRAVENOUS at 10:57

## 2018-01-21 NOTE — THERAPY TREATMENT NOTE
Acute Care - Physical Therapy Treatment Note  [facility]     Patient Name: Tawny Shin  : 1955  MRN: [id number]  Today's Date: 2018  Onset of Illness/Injury or Date of Surgery Date: 18  Date of Referral to PT: 18  Referring Physician: Dr. Oropeza    Admit Date: 2018    Visit Dx:    ICD-10-CM ICD-9-CM   1. Impaired mobility and ADLs Z74. 799.89   2. Impaired mobility Z74. 799.89     Patient Active Problem List   Diagnosis   • Eustachian tube dysfunction   • Sensorineural hearing loss   • Lumbago of multiple sites in spine with sciatica   • Spondylolisthesis of lumbar region               Adult Rehabilitation Note       18 0938 18 1536 18 1503    Rehab Assessment/Intervention    Discipline physical therapy assistant  - physical therapy assistant  -KINGS physical therapy assistant  -NW    Document Type therapy note (daily note)  - therapy note (daily note)  - therapy note (daily note)  -NW    Subjective Information agree to therapy;complains of;weakness;pain;numbness   numbness left thigh  - agree to therapy;complains of;pain  - agree to therapy;complains of;weakness;pain  -NW    Patient Effort, Rehab Treatment  good  -KINGS good  -NW    Precautions/Limitations fall precautions;brace on when up   L knee immobilizer with amb  - brace on when up;fall precautions;spinal precautions;other (see comments)   L knee buckling- knee immobilizer  - brace on when up;fall precautions;spinal precautions  -NW    Specific Treatment Considerations   LLE buddy cues to lock knee when amb  -NW    Recorded by [AH] Olga Chambers, PTA [KINGS] Godfrey Tierney, PTA [NW] Angle Nagel PTA    Pain Assessment    Pain Assessment 0-10  - 0-10  -KINGS 0-10  -NW    Pain Score 3  -AH 4  -KINGS 5  -NW    Post Pain Score  5  -KINGS     Pain Type Acute pain;Surgical pain  -AH Acute pain;Surgical pain  -KINGS Acute pain;Surgical pain  -NW    Pain Location Back  - Back  -KINGS Incision  -NW    Pain  Orientation   Left  -NW    Pain Descriptors Aching  - Aching  -KINGS Aching;Radiating;Throbbing  -NW    Pain Frequency Constant/continuous  -      Pain Intervention(s) Medication (See MAR);Repositioned;Ambulation/increased activity  - Repositioned;Ambulation/increased activity  -KINGS Repositioned;Medication (See MAR)  -NW    Response to Interventions tolerated  - tolerated  -KINGS     Recorded by [] Olga Chambers PTA [KINGS] Godfrey Tierney PTA [NW] Angle Nagel PTA    Bed Mobility, Assessment/Treatment    Bed Mob, Sit to Supine, [place]  verbal cues required;minimum assist (75% patient effort);2 person assist required  -KINGS     Bed Mobility, Impairments  strength decreased  -KINGS     Bed Mobility, Comment chair  -  up in chair  -NW    Recorded by [] Olga Chambers PTA [KINGS] Godfrey Tierney, SERENA [NW] Angle Nagel PTA    Transfer Assessment/Treatment    Transfers, Sit-Stand [place] contact guard assist;2 person assist required;verbal cues required;minimum assist (75% patient effort)  - verbal cues required;contact guard assist;2 person assist required  -KINGS verbal cues required;contact guard assist;minimum assist (75% patient effort)  -NW    Transfers, Stand-Sit [place] contact guard assist;verbal cues required  - verbal cues required;minimum assist (75% patient effort)  -KINGS verbal cues required;minimum assist (75% patient effort);2 person assist required  -NW    Transfers, Sit-Stand-Sit, Assist Device rolling walker  - rolling walker  -KINGS rolling walker  -NW    Transfer, Safety Issues   knees buckling;step length decreased;weight-shifting ability decreased  -NW    Transfer, Impairments  strength decreased  -KINGS ROM decreased;strength decreased;pain  -NW    Transfer, Comment   pt able to lock L knee herself to prevent knee from buckling during amb  -NW    Recorded by [] Olga Chambers PTA [KINGS] Godfrey Tierney, SERENA [NW] Angle Nagel PTA    Gait Assessment/Treatment    Gait,  [place] Level contact guard assist;verbal cues required  - verbal cues required;contact guard assist;2 person assist required  - contact guard assist;1 person + 1 person to manage equipment  -NW    Gait, Assistive Device rolling walker  - rolling walker  - rolling walker  -NW    Gait, Distance (Feet) 60  - 60  -KINGS 10   10x2  -NW    Gait, Gait Deviations rush decreased;left:;decreased heel strike  - left:;step length decreased;weight-shifting ability decreased;toe-to-floor clearance decreased   difficulty advancing LLE due to weakness/numbness   - rush decreased;step length decreased;stride length decreased;left:;knee buckling  -NW    Gait, Safety Issues step length decreased  - step length decreased;weight-shifting ability decreased  - step length decreased;weight-shifting ability decreased  -NW    Gait, Impairments strength decreased  - strength decreased  - pain;strength decreased;sensation decreased  -NW    Gait, Comment L knee immobilizer  - her knee immobilizer slid down during gait  -KINGS     Recorded by [] Olga Chambers PTA [KINGS] Godfrey Tierney PTA [NW] Angle Nagel PTA    Therapy Exercises    Right Lower Extremity  AROM:;15 reps;sitting;ankle pumps/circles;LAQ  -     Left Lower Extremity AROM:;AAROM:;10 reps;ankle pumps/circles;glut sets;heel slides;hip abduction/adduction;LAQ;quad sets  - AROM:;20 reps;sitting;ankle pumps/circles  - AROM:;AAROM:;10 reps;hip abduction/adduction;LAQ;sitting;hip flexion  -NW    Recorded by [] Olga Chambers PTA [KINGS] Godfrey Tierney PTA [NW] Angle Nagel PTA    Orthotics Prosthetics    Additional Documentation Orthosis Location (Group)  - Orthosis Location (Group);Orthosis Management/Training (Group)  - Orthosis Location (Group)  -NW    Recorded by [] Olga Chambers PTA [KINGS] Godfrey Tierney PTA [NW] Angle Nagel PTA    Orthosis Location    Orthosis Location/Type neck/back;lower extremity  - neck/back;lower  extremity  -KINGS neck/back  -NW    Orthosis, Neck/Back LSO (lumbar sacral orthosis)  - LSO (lumbar sacral orthosis);other (see comments)  -KINGS LSO (lumbar sacral orthosis)  -    Orthosis, Lower Extremity Left:;knee immobilizer   PRN, with amb  - Left:;knee immobilizer  -KINGS     Recorded by [] Olga Chambers PTA [KINGS] Godfrey Tierney PTA [NW] Angle Nagel PTA    Orthosis Management/Training    Orthosis Indications  immobilize, protect/position healing structures;rest, reduce inflammation;rest, reduce pain;restrict motion  -     Orthosis Skills Training  activity limitations;restrictions/precautions  -KINGS     Orthosis Wear Schedule  wear when out of bed only;remove for hygiene/bathing  -KINGS     Recorded by  [KINGS] Godfrey Tierney PTA     Positioning and Restraints    Pre-Treatment Position sitting in chair/recliner  - sitting in chair/recliner  -KINGS sitting in chair/recliner  -NW    Post Treatment Position chair  - bed  -KINGS chair  -NW    In Bed  fowlers;call light within reach;encouraged to call for assist;SCD pump applied  -KINGS     In Chair reclined;call light within reach;encouraged to call for assist  -  sitting;call light within reach;encouraged to call for assist;with family/caregiver  -NW    Recorded by [] Olga Chambers PTA [KINGS] Godfrey Tierney, SERENA [NW] Angle Nagel PTA      User Key  (r) = Recorded By, (t) = Taken By, (c) = Cosigned By    Initials Name Effective Dates     Olga Chambers PTA 08/02/16 -     NW Angle Nagel PTA 08/02/16 -     KINGS Tierney PTA 08/02/16 -                 IP PT Goals       01/20/18 1302 01/18/18 1133       Bed Mobility PT LTG    Bed Mobility PT LTG, Date Established 01/20/18  -KR 01/18/18  -MS     Bed Mobility PT LTG, Time to Achieve  by discharge  -MS     Bed Mobility PT LTG, Activity Type  all bed mobility  -MS     Bed Mobility PT LTG, [place] Level  independent  -MS     Transfer Training PT LTG    Transfer Training PT LTG, Date  Established 01/20/18  -KR 01/18/18  -MS     Transfer Training PT LTG, Time to Achieve  by discharge  -MS     Transfer Training PT LTG, Activity Type  sit to stand/stand to sit  -MS     Transfer Training PT LTG, [place] Level  conditional independence  -MS     Transfer Training PT LTG, Assist Device  walker, rolling  -MS     Gait Training PT LTG    Gait Training Goal PT LTG, Date Established 01/20/18  -KR 01/18/18  -MS     Gait Training Goal PT LTG, Time to Achieve  by discharge  -MS     Gait Training Goal PT LTG, [place] Level  contact guard assist  -MS     Gait Training Goal PT LTG, Assist Device  walker, rolling  -MS     Gait Training Goal PT LTG, Distance to Achieve  50ft clearing L foot 50% of the time  -MS     Stair Training PT LTG    Stair Training Goal PT LTG, Date Established 01/20/18  -KR 01/18/18  -MS     Stair Training Goal PT LTG, Time to Achieve  by discharge  -MS     Stair Training Goal PT LTG, Number of Steps  3  -MS     Stair Training Goal PT LTG, [place] Level  contact guard assist  -MS     Stair Training Goal PT LTG, Assist Device  1 handrail  -MS     Strength Goal PT LTG    Strength Goal PT LTG, Date Established 01/20/18  -KR 01/18/18  -MS     Strength Goal PT LTG, Time to Achieve  by discharge  -MS     Strength Goal PT LTG, Functional Goal  pt will demonstrate full AROM of left hip flexion  -MS       User Key  (r) = Recorded By, (t) = Taken By, (c) = Cosigned By    Initials Name Provider Type    HERB Hale, PT DPT Physical Therapist    MS Nicole Olson, PT DPT Physical Therapist          Physical Therapy Education     Title: PT OT SLP Therapies (Done)     Topic: Physical Therapy (Done)     Point: Mobility training (Done)    Learning Progress Summary    Learner Readiness Method Response Comment Documented by Status   Patient Acceptance DARLYN ZAVALA Educated on bed mobility with spinal precautions. KINGS 01/20/18 4531 Done    Acceptance COURTNEY LAGUNAS knee immobilizer wear, posture,  changing positions while awake KR 01/20/18 1301 Done    Acceptance E VU spinal precautions, exercises for L hip flexion, use of brace once available MS 01/18/18 0854 Done   Family Acceptance E,D VU knee immobilizer wear, posture, changing positions while awake KR 01/20/18 1301 Done               Point: Home exercise program (Done)    Learning Progress Summary    Learner Readiness Method Response Comment Documented by Status   Patient Acceptance E,TB,D,H VU HEP  01/21/18 1022 Done               Point: Body mechanics (Done)    Learning Progress Summary    Learner Readiness Method Response Comment Documented by Status   Patient Acceptance E,D VU knee immobilizer wear, posture, changing positions while awake KR 01/20/18 1301 Done   Family Acceptance E,D VU knee immobilizer wear, posture, changing positions while awake KR 01/20/18 1301 Done               Point: Precautions (Done)    Learning Progress Summary    Learner Readiness Method Response Comment Documented by Status   Patient Acceptance E,D VU knee immobilizer wear, posture, changing positions while awake KR 01/20/18 1301 Done    Acceptance E VU spinal precautions, exercises for L hip flexion, use of brace once available MS 01/18/18 0854 Done   Family Acceptance E,D VU knee immobilizer wear, posture, changing positions while awake KR 01/20/18 1301 Done                      User Key     Initials Effective Dates Name Provider Type Discipline     08/02/16 -  Olga Chambers, PTA Physical Therapy Assistant PT     06/19/15 -  Yue Hale, PT DPT Physical Therapist PT    MS 08/02/16 -  Nicole Olson, PT DPT Physical Therapist PT     08/02/16 -  Godfrey Tierney, SERENA Physical Therapy Assistant PT                    PT Recommendation and Plan  Anticipated Discharge Disposition: inpatient rehabilitation facility  Planned Therapy Interventions: balance training, bed mobility training, gait training, home exercise program, lumbar stabilization, motor  coordination training, neuromuscular re-education, orthotic fitting/training, patient/family education, postural re-education, ROM (Range of Motion), stair training, strengthening, stretching, swiss ball techniques, transfer training  PT Frequency: 2 times/day, per priority policy  Plan of Care Review  Plan Of Care Reviewed With: patient  Progress: progress toward functional goals as expected  Outcome Summary/Follow up Plan: pt in chair, assisted pt with strengthening exercises for LLE, abilio L knee immobilizer for amb, pt trans sit-stand cga-min assist, pt amb 60 feet with rwx cga. Pt cont to c/o numbness L thigh. Pt would benefit from cont therapy for stengthening and mobillity.          Outcome Measures       01/21/18 1000 01/20/18 1536 01/20/18 1300    How much help from another person do you currently need...    Turning from your back to your side while in flat bed without using bedrails? 3  - 3  -KINGS 3  -KR    Moving from lying on back to sitting on the side of a flat bed without bedrails? 3  - 3  -KINGS 3  -KR    Moving to and from a bed to a chair (including a wheelchair)? 3  - 3  -KINGS 2  -KR    Standing up from a chair using your arms (e.g., wheelchair, bedside chair)? 3  - 3  -KINGS 2  -KR    Climbing 3-5 steps with a railing? 1  - 1  -KINGS 1  -KR    To walk in hospital room? 3  - 3  -KINGS 3  -KR    AM-PAC 6 Clicks Score 16  - 16  -KINGS 14  -KR    Functional Assessment    Outcome Measure Options AM-PAC 6 Clicks Basic Mobility (PT)  - AM-PAC 6 Clicks Basic Mobility (PT)  -KINGS AM-PAC 6 Clicks Basic Mobility (PT)  -KR      01/20/18 1115          How much help from another is currently needed...    Putting on and taking off regular lower body clothing? 2  -AC      Bathing (including washing, rinsing, and drying) 2  -AC      Toileting (which includes using toilet bed pan or urinal) 2  -AC      Putting on and taking off regular upper body clothing 3  -AC      Taking care of personal grooming (such as brushing  teeth) 3  -AC      Eating meals 4  -AC      Score 16  -AC      Functional Assessment    Outcome Measure Options AM-PAC 6 Clicks Daily Activity (OT)  -AC        User Key  (r) = Recorded By, (t) = Taken By, (c) = Cosigned By    Initials Name Provider Type     Sebastián Howe, OTR/L Occupational Therapist     Olga Chambers, SERENA Physical Therapy Assistant    HERB Hale, PT DPT Physical Therapist    KINGS Tierney, PTA Physical Therapy Assistant           Time Calculation:         PT Charges       01/21/18 1027          Time Calculation    Start Time 0938  -      Stop Time 1008  -      Time Calculation (min) 30 min  -      PT Received On 01/21/18  -      PT Goal Re-Cert Due Date 01/30/18  -      Time Calculation- PT    Total Timed Code Minutes- PT 30 minute(s)  -        User Key  (r) = Recorded By, (t) = Taken By, (c) = Cosigned By    Initials Name Provider Type     Olga Chambers, SERENA Physical Therapy Assistant          Therapy Charges for Today     Code Description Service Date Service Provider Modifiers Qty    14426384904 HC GAIT TRAINING EA 15 MIN 1/21/2018 Olga Chambers, SERENA GP, KX 1    37768618825 HC PT THER PROC EA 15 MIN 1/21/2018 Olga Chambers PTA GP, KX 1          PT G-Codes  PT Professional Judgement Used?: Yes  Outcome Measure Options: AM-PAC 6 Clicks Basic Mobility (PT)  Score: 14  Functional Limitation: Mobility: Walking and moving around  Mobility: Walking and Moving Around Current Status (): At least 60 percent but less than 80 percent impaired, limited or restricted  Mobility: Walking and Moving Around Goal Status (): At least 20 percent but less than 40 percent impaired, limited or restricted    Olga Chambers PTA  1/21/2018

## 2018-01-21 NOTE — THERAPY TREATMENT NOTE
Acute Care - Physical Therapy Treatment Note  [facility]     Patient Name: Tawny Shin  : 1955  MRN: [id number]  Today's Date: 2018  Onset of Illness/Injury or Date of Surgery Date: 18  Date of Referral to : 18  Referring Physician: Dr. Oropeza    Admit Date: 2018    Visit Dx:    ICD-10-CM ICD-9-CM   1. Impaired mobility and ADLs Z74.09 799.89   2. Impaired mobility Z74. 799.89     Patient Active Problem List   Diagnosis   • Eustachian tube dysfunction   • Sensorineural hearing loss   • Lumbago of multiple sites in spine with sciatica   • Spondylolisthesis of lumbar region               Adult Rehabilitation Note       18 1435 18 0938 18 1536    Rehab Assessment/Intervention    Discipline physical therapy assistant  - physical therapy assistant  - physical therapy assistant  -    Document Type therapy note (daily note)  - therapy note (daily note)  - therapy note (daily note)  -    Subjective Information agree to therapy;complains of;pain  - agree to therapy;complains of;weakness;pain;numbness   numbness left thigh  - agree to therapy;complains of;pain  -    Patient Effort, Rehab Treatment   good  -    Precautions/Limitations fall precautions;brace on when up   L knee immobilizer when up  - fall precautions;brace on when up   L knee immobilizer with amb  - brace on when up;fall precautions;spinal precautions;other (see comments)   L knee buckling- knee immobilizer  -    Recorded by [] Cristina Gonsalves, PTA [] Olga Chambers, PTA [] Godfrey Tierney, PTA    Pain Assessment    Pain Assessment 0-10  - 0-10  - 0-10  -KINGS    Pain Score 5  - 3  -AH 4  -KINGS    Post Pain Score 5  -  5  -KINGS    Pain Type Acute pain;Surgical pain  - Acute pain;Surgical pain  - Acute pain;Surgical pain  -    Pain Location Back  - Back  - Back  -    Pain Descriptors Aching  - Aching  - Aching  -    Pain Frequency Constant/continuous  -  Constant/continuous  -     Pain Intervention(s) Medication (See MAR)  - Medication (See MAR);Repositioned;Ambulation/increased activity  - Repositioned;Ambulation/increased activity  -    Response to Interventions tolerated  - tolerated  - tolerated  -    Recorded by [] Cristina Gonsalves, PTA [] Olga Chambers PTA [KINGS] Godfrey Tierney PTA    Bed Mobility, Assessment/Treatment    Bed Mobility, Assistive Device bed rails  -      Bed Mob, Supine to Sit, [place] contact guard assist  -      Bed Mob, Sit to Supine, [place] contact guard assist   pt. required a little assistance with L LE on second time  -  verbal cues required;minimum assist (75% patient effort);2 person assist required  -    Bed Mobility, Impairments   strength decreased  -    Bed Mobility, Comment pt. had to use BR after getting in bed after walk. Assisted pt. up again and then back to bed.  - chair  -     Recorded by [] Cristina Gonsalves PTA [] Olga Chambers PTA [KINGS] Godfrey Tierney PTA    Transfer Assessment/Treatment    Transfers, Sit-Stand [place] contact guard assist  - contact guard assist;2 person assist required;verbal cues required;minimum assist (75% patient effort)  - verbal cues required;contact guard assist;2 person assist required  -KINGS    Transfers, Stand-Sit [place] contact guard assist  - contact guard assist;verbal cues required  - verbal cues required;minimum assist (75% patient effort)  -KINGS    Transfers, Sit-Stand-Sit, Assist Device  rolling walker  - rolling walker  -    Toilet Transfer, [place] contact guard assist;supervision required  -      Toilet Transfer, Assistive Device --   pt. able to wipe and pull underwear up/down  -      Transfer, Impairments   strength decreased  -KINGS    Transfer, Comment stood x 4 Had to stand again to adjust knee immobilizer  -      Recorded by [] Cristina Gonsalves PTA [] Olga Chambers, SERENA [KINGS] Godfrey ROSEN  SERENA Tierney    Gait Assessment/Treatment    Gait, [place] Level contact guard assist  - contact guard assist;verbal cues required  - verbal cues required;contact guard assist;2 person assist required  -    Gait, Assistive Device rolling walker  - rolling walker  - rolling walker  -    Gait, Distance (Feet) 100  - 60  - 60  -KINGS    Gait, Gait Deviations rush decreased;left:;decreased heel strike  - rush decreased;left:;decreased heel strike  - left:;step length decreased;weight-shifting ability decreased;toe-to-floor clearance decreased   difficulty advancing LLE due to weakness/numbness   -    Gait, Safety Issues step length decreased  - step length decreased  - step length decreased;weight-shifting ability decreased  -    Gait, Impairments strength decreased  - strength decreased  - strength decreased  -    Gait, Comment L knee immobilizer, but it was not tight enough and slid down during gait. Pts knee did not buckle. Pt. walked to and from BR without immobilizer and knee did not buckle either.  - L knee immobilizer  - her knee immobilizer slid down during gait  -KINGS    Recorded by [] Cristina Gonsalves, PTA [] Olga Chambers, PTA [] Godfrey Tierney, PTA    Therapy Exercises    Right Lower Extremity   AROM:;15 reps;sitting;ankle pumps/circles;LAQ  -    Left Lower Extremity AROM:;AAROM:;20 reps;supine;ankle pumps/circles;heel slides;hip abduction/adduction;SAQ;quad sets   aarom for heel slides and abd/add  - AROM:;AAROM:;10 reps;ankle pumps/circles;glut sets;heel slides;hip abduction/adduction;LAQ;quad sets  - AROM:;20 reps;sitting;ankle pumps/circles  -KINGS    Recorded by [] Cristina Gonsalves PTA [] Olga Chambers, PTA [] Godfrey Tierney, PTA    Orthotics Prosthetics    Additional Documentation Orthosis Location (Group)  - Orthosis Location (Group)  - Orthosis Location (Group);Orthosis Management/Training (Group)  -KINGS    Recorded by []  Cristina Gonsalves, SERENA [] Olga Chambers, PTA [] Godfrey Tierney, SERENA    Orthosis Location    Orthosis Location/Type neck/back;lower extremity  - neck/back;lower extremity  - neck/back;lower extremity  -    Orthosis, Neck/Back LSO (lumbar sacral orthosis)   pt. is independent with donning and doffing  - LSO (lumbar sacral orthosis)  - LSO (lumbar sacral orthosis);other (see comments)  -KINGS    Orthosis, Lower Extremity --   left knee immobilizer prn when up  - Left:;knee immobilizer   PRN, with amb  - Left:;knee immobilizer  -KINGS    Recorded by [] Cristina Gonsalves PTA [] Olga Chambers, SERENA [KINGS] Godfrey Tierney PTA    Orthosis Management/Training    Orthosis Indications   immobilize, protect/position healing structures;rest, reduce inflammation;rest, reduce pain;restrict motion  -    Orthosis Skills Training   activity limitations;restrictions/precautions  -    Orthosis Wear Schedule   wear when out of bed only;remove for hygiene/bathing  -    Recorded by   [] Godfrey Tierney PTA    Positioning and Restraints    Pre-Treatment Position sitting in chair/recliner  - sitting in chair/recliner  - sitting in chair/recliner  -    Post Treatment Position bed  - chair  - bed  -KINGS    In Bed fowlers;call light within reach;with family/caregiver;side rails up x2  -  fowlers;call light within reach;encouraged to call for assist;SCD pump applied  -KINGS    In Chair  reclined;call light within reach;encouraged to call for assist  -     Recorded by [] Cristina Gonsalves, SERENA [] Olga Chambers, SERENA [KINGS] Godfrey Tierney PTA      User Key  (r) = Recorded By, (t) = Taken By, (c) = Cosigned By    Initials Name Effective Dates     Olga Chambers, SERENA 08/02/16 -     KINGS Godfrey Tierney, SERENA 08/02/16 -      Cristina Gonsalves, PTA 08/02/16 -                 IP PT Goals       01/20/18 1302 01/18/18 1133       Bed Mobility PT LTG    Bed Mobility PT LTG, Date Established 01/20/18  -KR  01/18/18  -MS     Bed Mobility PT LTG, Time to Achieve  by discharge  -MS     Bed Mobility PT LTG, Activity Type  all bed mobility  -MS     Bed Mobility PT LTG, [place] Level  independent  -MS     Transfer Training PT LTG    Transfer Training PT LTG, Date Established 01/20/18  -KR 01/18/18  -MS     Transfer Training PT LTG, Time to Achieve  by discharge  -MS     Transfer Training PT LTG, Activity Type  sit to stand/stand to sit  -MS     Transfer Training PT LTG, [place] Level  conditional independence  -MS     Transfer Training PT LTG, Assist Device  walker, rolling  -MS     Gait Training PT LTG    Gait Training Goal PT LTG, Date Established 01/20/18  -KR 01/18/18  -MS     Gait Training Goal PT LTG, Time to Achieve  by discharge  -MS     Gait Training Goal PT LTG, [place] Level  contact guard assist  -MS     Gait Training Goal PT LTG, Assist Device  walker, rolling  -MS     Gait Training Goal PT LTG, Distance to Achieve  50ft clearing L foot 50% of the time  -MS     Stair Training PT LTG    Stair Training Goal PT LTG, Date Established 01/20/18  -KR 01/18/18  -MS     Stair Training Goal PT LTG, Time to Achieve  by discharge  -MS     Stair Training Goal PT LTG, Number of Steps  3  -MS     Stair Training Goal PT LTG, [place] Level  contact guard assist  -MS     Stair Training Goal PT LTG, Assist Device  1 handrail  -MS     Strength Goal PT LTG    Strength Goal PT LTG, Date Established 01/20/18  -KR 01/18/18  -MS     Strength Goal PT LTG, Time to Achieve  by discharge  -MS     Strength Goal PT LTG, Functional Goal  pt will demonstrate full AROM of left hip flexion  -MS       User Key  (r) = Recorded By, (t) = Taken By, (c) = Cosigned By    Initials Name Provider Type    HERB Hale, PT DPT Physical Therapist    MS Nicole Olson, PT DPT Physical Therapist          Physical Therapy Education     Title: PT OT SLP Therapies (Done)     Topic: Physical Therapy (Done)     Point: Mobility  training (Done)    Learning Progress Summary    Learner Readiness Method Response Comment Documented by Status   Patient Acceptance E VU,DU Educated on bed mobility with spinal precautions. KINGS 01/20/18 1558 Done    Acceptance E,D VU knee immobilizer wear, posture, changing positions while awake KR 01/20/18 1301 Done    Acceptance E VU spinal precautions, exercises for L hip flexion, use of brace once available MS 01/18/18 0854 Done   Family Acceptance E,D VU knee immobilizer wear, posture, changing positions while awake KR 01/20/18 1301 Done               Point: Home exercise program (Done)    Learning Progress Summary    Learner Readiness Method Response Comment Documented by Status   Patient Acceptance E,TB,D,H VU HEP  01/21/18 1022 Done               Point: Body mechanics (Done)    Learning Progress Summary    Learner Readiness Method Response Comment Documented by Status   Patient Acceptance E,D VU knee immobilizer wear, posture, changing positions while awake KR 01/20/18 1301 Done   Family Acceptance E,D VU knee immobilizer wear, posture, changing positions while awake KR 01/20/18 1301 Done               Point: Precautions (Done)    Learning Progress Summary    Learner Readiness Method Response Comment Documented by Status   Patient Acceptance E,D VU knee immobilizer wear, posture, changing positions while awake KR 01/20/18 1301 Done    Acceptance E VU spinal precautions, exercises for L hip flexion, use of brace once available MS 01/18/18 0854 Done   Family Acceptance E,D VU knee immobilizer wear, posture, changing positions while awake KR 01/20/18 1301 Done                      User Key     Initials Effective Dates Name Provider Type Discipline     08/02/16 -  Olga Chambers, PTA Physical Therapy Assistant PT     06/19/15 -  Yue Hale, PT DPT Physical Therapist PT    MS 08/02/16 -  Nicole Olson, PT DPT Physical Therapist PT    KINGS 08/02/16 -  Godfrey Tierney, SERENA Physical Therapy Assistant  PT                    PT Recommendation and Plan  Anticipated Discharge Disposition: inpatient rehabilitation facility  Planned Therapy Interventions: balance training, bed mobility training, gait training, home exercise program, lumbar stabilization, motor coordination training, neuromuscular re-education, orthotic fitting/training, patient/family education, postural re-education, ROM (Range of Motion), stair training, strengthening, stretching, swiss ball techniques, transfer training  PT Frequency: 2 times/day, per priority policy             Outcome Measures       01/21/18 1000 01/20/18 1536 01/20/18 1300    How much help from another person do you currently need...    Turning from your back to your side while in flat bed without using bedrails? 3  -AH 3  -KINGS 3  -KR    Moving from lying on back to sitting on the side of a flat bed without bedrails? 3  - 3  -KINGS 3  -KR    Moving to and from a bed to a chair (including a wheelchair)? 3  - 3  -KINGS 2  -KR    Standing up from a chair using your arms (e.g., wheelchair, bedside chair)? 3  - 3  -KINGS 2  -KR    Climbing 3-5 steps with a railing? 1  - 1  -KINGS 1  -KR    To walk in hospital room? 3  - 3  -KINGS 3  -KR    AM-PAC 6 Clicks Score 16  -AH 16  -KINGS 14  -KR    Functional Assessment    Outcome Measure Options AM-PAC 6 Clicks Basic Mobility (PT)  -AH AM-PAC 6 Clicks Basic Mobility (PT)  -KINGS AM-PAC 6 Clicks Basic Mobility (PT)  -KR      01/20/18 1115          How much help from another is currently needed...    Putting on and taking off regular lower body clothing? 2  -AC      Bathing (including washing, rinsing, and drying) 2  -AC      Toileting (which includes using toilet bed pan or urinal) 2  -AC      Putting on and taking off regular upper body clothing 3  -AC      Taking care of personal grooming (such as brushing teeth) 3  -AC      Eating meals 4  -AC      Score 16  -AC      Functional Assessment    Outcome Measure Options AM-PAC 6 Clicks Daily Activity (OT)   -AC        User Key  (r) = Recorded By, (t) = Taken By, (c) = Cosigned By    Initials Name Provider Type     Sebastián Howe, OTR/L Occupational Therapist     Olga Chambers, [facility] Physical Therapy Assistant    HREB Hale, PT DPT Physical Therapist    KINGS Tierney, [facility] Physical Therapy Assistant           Time Calculation:         PT Charges       01/21/18 1538 01/21/18 1027       Time Calculation    Start Time 1435  - 0938  -     Stop Time 1513  -MF 1008  -     Time Calculation (min) 38 min  -MF 30 min  -     PT Received On 01/21/18  - 01/21/18  -     PT Goal Re-Cert Due Date 01/30/18  - 01/30/18  -     Time Calculation- PT    Total Timed Code Minutes- PT 38 minute(s)  -MF 30 minute(s)  -       User Key  (r) = Recorded By, (t) = Taken By, (c) = Cosigned By    Initials Name Provider Type     Olga Chambers, [facility] Physical Therapy Assistant     Cristina Gonsalves, [facility] Physical Therapy Assistant          Therapy Charges for Today     Code Description Service Date Service Provider Modifiers Qty    42604349271 HC PT THER SUPP EA 15 MIN 1/20/2018 Cristina Gonsalves PTA GP 1    09425090657 HC GAIT TRAINING EA 15 MIN 1/21/2018 Cristina Gonsalves PTA GP, KX 2    96668203567 HC PT THER PROC EA 15 MIN 1/21/2018 Cristina Gonsalves PTA GP, KX 1          PT G-Codes  PT Professional Judgement Used?: Yes  Outcome Measure Options: AM-PAC 6 Clicks Basic Mobility (PT)  Score: 14  Functional Limitation: Mobility: Walking and moving around  Mobility: Walking and Moving Around Current Status (): At least 60 percent but less than 80 percent impaired, limited or restricted  Mobility: Walking and Moving Around Goal Status (): At least 20 percent but less than 40 percent impaired, limited or restricted    Cristina Gonsalves PTA  1/21/2018

## 2018-01-21 NOTE — PLAN OF CARE
Problem: Patient Care Overview (Adult)  Goal: Plan of Care Review  Outcome: Ongoing (interventions implemented as appropriate)   01/21/18 0506   Coping/Psychosocial Response Interventions   Plan Of Care Reviewed With patient   Patient Care Overview   Progress improving   Outcome Evaluation   Outcome Summary/Follow up Plan Medicated for c/o pain. Drsg to back with small amt of drainage. C/O numbness to left leg. Resting well this shift.     Goal: Adult Individualization and Mutuality  Outcome: Ongoing (interventions implemented as appropriate)    Goal: Discharge Needs Assessment  Outcome: Ongoing (interventions implemented as appropriate)      Problem: Perioperative Period (Adult)  Goal: Signs and Symptoms of Listed Potential Problems Will be Absent or Manageable (Perioperative Period)  Outcome: Ongoing (interventions implemented as appropriate)    Goal: Signs and Symptoms of Listed Potential Problems Will be Absent or Manageable (Perioperative Period)  Outcome: Outcome(s) achieved Date Met: 01/21/18      Problem: Laminectomy/Foraminotomy/Discectomy (Adult)  Goal: Signs and Symptoms of Listed Potential Problems Will be Absent or Manageable (Laminectomy/Foraminotomy/Discectomy)  Outcome: Ongoing (interventions implemented as appropriate)      Problem: Fall Risk (Adult)  Goal: Absence of Falls  Outcome: Ongoing (interventions implemented as appropriate)

## 2018-01-21 NOTE — PLAN OF CARE
Problem: Patient Care Overview (Adult)  Goal: Plan of Care Review   01/21/18 9470   Coping/Psychosocial Response Interventions   Plan Of Care Reviewed With patient   Patient Care Overview   Progress improving   Outcome Evaluation   Outcome Summary/Follow up Plan Patient has maintained safety this shift with c/o pain to lower incisional back pain, given prn pain medication with relief noted. Dressing to lower back was changed this shift, middle incision continues to drain small amount of seriosangious drainage, covered incision with gauze and medipore tape, patient tolerated well. Continues on IV antibiotics. Patient was able to transfer self better with left knee immobilizer on without feeling like her knee was going to buckle under her. Patient c/o numbness in left leg. VSS. Will continue to monitor.        Problem: Perioperative Period (Adult)  Goal: Signs and Symptoms of Listed Potential Problems Will be Absent or Manageable (Perioperative Period)  Outcome: Ongoing (interventions implemented as appropriate)      Problem: Laminectomy/Foraminotomy/Discectomy (Adult)  Goal: Signs and Symptoms of Listed Potential Problems Will be Absent or Manageable (Laminectomy/Foraminotomy/Discectomy)  Outcome: Ongoing (interventions implemented as appropriate)      Problem: Fall Risk (Adult)  Goal: Absence of Falls  Outcome: Ongoing (interventions implemented as appropriate)

## 2018-01-21 NOTE — PLAN OF CARE
Problem: Patient Care Overview (Adult)  Goal: Plan of Care Review  Outcome: Ongoing (interventions implemented as appropriate)   01/21/18 1023   Coping/Psychosocial Response Interventions   Plan Of Care Reviewed With patient   Patient Care Overview   Progress progress toward functional goals as expected   Outcome Evaluation   Outcome Summary/Follow up Plan pt in chair, assisted pt with strengthening exercises for LLE, abilio BECKMAN knee immobilizer for amb, pt trans sit-stand cga-min assist, pt amb 60 feet with rwx cga. Pt cont to c/o numbness L thigh. Pt would benefit from cont therapy for stengthening and mobillity.

## 2018-01-22 ENCOUNTER — HOSPITAL ENCOUNTER (INPATIENT)
Age: 63
LOS: 11 days | Discharge: HOME | DRG: 560 | End: 2018-02-02
Attending: PSYCHIATRY & NEUROLOGY | Admitting: PSYCHIATRY & NEUROLOGY
Payer: MEDICARE

## 2018-01-22 VITALS
BODY MASS INDEX: 34.45 KG/M2 | WEIGHT: 206.79 LBS | HEART RATE: 61 BPM | RESPIRATION RATE: 16 BRPM | DIASTOLIC BLOOD PRESSURE: 56 MMHG | TEMPERATURE: 97.7 F | HEIGHT: 65 IN | SYSTOLIC BLOOD PRESSURE: 151 MMHG | OXYGEN SATURATION: 94 %

## 2018-01-22 DIAGNOSIS — M48.062 LUMBAR STENOSIS WITH NEUROGENIC CLAUDICATION: Primary | ICD-10-CM

## 2018-01-22 LAB
BASOPHILS ABSOLUTE: 0 K/UL (ref 0–0.2)
BASOPHILS RELATIVE PERCENT: 0.8 % (ref 0–1)
BILIRUBIN URINE: NEGATIVE
BLOOD, URINE: NEGATIVE
CLARITY: CLEAR
COLOR: YELLOW
EOSINOPHILS ABSOLUTE: 0.1 K/UL (ref 0–0.6)
EOSINOPHILS RELATIVE PERCENT: 2.2 % (ref 0–5)
GLUCOSE URINE: NEGATIVE MG/DL
HCT VFR BLD CALC: 34.7 % (ref 37–47)
HEMOGLOBIN: 10.8 G/DL (ref 12–16)
KETONES, URINE: NEGATIVE MG/DL
LEUKOCYTE ESTERASE, URINE: NEGATIVE
LYMPHOCYTES ABSOLUTE: 1.6 K/UL (ref 1.1–4.5)
LYMPHOCYTES RELATIVE PERCENT: 31.1 % (ref 20–40)
MCH RBC QN AUTO: 29.8 PG (ref 27–31)
MCHC RBC AUTO-ENTMCNC: 31.1 G/DL (ref 33–37)
MCV RBC AUTO: 95.9 FL (ref 81–99)
MONOCYTES ABSOLUTE: 0.6 K/UL (ref 0–0.9)
MONOCYTES RELATIVE PERCENT: 11.5 % (ref 0–10)
NEUTROPHILS ABSOLUTE: 2.7 K/UL (ref 1.5–7.5)
NEUTROPHILS RELATIVE PERCENT: 53.8 % (ref 50–65)
NITRITE, URINE: NEGATIVE
PDW BLD-RTO: 15.9 % (ref 11.5–14.5)
PH UA: 7
PLATELET # BLD: 177 K/UL (ref 130–400)
PMV BLD AUTO: 12.3 FL (ref 9.4–12.3)
PREALBUMIN: 21 MG/DL (ref 20–40)
PROTEIN UA: NEGATIVE MG/DL
RBC # BLD: 3.62 M/UL (ref 4.2–5.4)
SPECIFIC GRAVITY UA: 1.01
UROBILINOGEN, URINE: 0.2 E.U./DL
WBC # BLD: 5.1 K/UL (ref 4.8–10.8)

## 2018-01-22 PROCEDURE — 97535 SELF CARE MNGMENT TRAINING: CPT

## 2018-01-22 PROCEDURE — 84134 ASSAY OF PREALBUMIN: CPT

## 2018-01-22 PROCEDURE — 97110 THERAPEUTIC EXERCISES: CPT

## 2018-01-22 PROCEDURE — 6370000000 HC RX 637 (ALT 250 FOR IP): Performed by: PSYCHIATRY & NEUROLOGY

## 2018-01-22 PROCEDURE — 94664 DEMO&/EVAL PT USE INHALER: CPT

## 2018-01-22 PROCEDURE — 1180000000 HC REHAB R&B

## 2018-01-22 PROCEDURE — 63710000001 PREDNISONE PER 5 MG

## 2018-01-22 PROCEDURE — 81003 URINALYSIS AUTO W/O SCOPE: CPT

## 2018-01-22 PROCEDURE — 97116 GAIT TRAINING THERAPY: CPT

## 2018-01-22 PROCEDURE — 36415 COLL VENOUS BLD VENIPUNCTURE: CPT

## 2018-01-22 PROCEDURE — 85025 COMPLETE CBC W/AUTO DIFF WBC: CPT

## 2018-01-22 RX ORDER — CARVEDILOL 12.5 MG/1
12.5 TABLET ORAL 2 TIMES DAILY
Status: DISCONTINUED | OUTPATIENT
Start: 2018-01-22 | End: 2018-02-02 | Stop reason: HOSPADM

## 2018-01-22 RX ORDER — VALACYCLOVIR HYDROCHLORIDE 500 MG/1
500 TABLET, FILM COATED ORAL DAILY
Status: DISCONTINUED | OUTPATIENT
Start: 2018-01-22 | End: 2018-02-02 | Stop reason: HOSPADM

## 2018-01-22 RX ORDER — FLUTICASONE PROPIONATE 50 MCG
2 SPRAY, SUSPENSION (ML) NASAL DAILY
COMMUNITY
End: 2021-03-26

## 2018-01-22 RX ORDER — ERGOCALCIFEROL (VITAMIN D2) 1250 MCG
50000 CAPSULE ORAL WEEKLY
COMMUNITY
End: 2021-03-26

## 2018-01-22 RX ORDER — CETIRIZINE HYDROCHLORIDE 10 MG/1
10 TABLET ORAL DAILY
Status: DISCONTINUED | OUTPATIENT
Start: 2018-01-23 | End: 2018-02-02 | Stop reason: HOSPADM

## 2018-01-22 RX ORDER — OXYCODONE AND ACETAMINOPHEN 7.5; 325 MG/1; MG/1
1 TABLET ORAL EVERY 4 HOURS PRN
Status: ON HOLD | COMMUNITY
End: 2018-02-02 | Stop reason: HOSPADM

## 2018-01-22 RX ORDER — SPIRONOLACTONE 25 MG/1
25 TABLET ORAL DAILY
COMMUNITY
End: 2021-03-26

## 2018-01-22 RX ORDER — PANTOPRAZOLE SODIUM 40 MG/1
40 TABLET, DELAYED RELEASE ORAL DAILY
Status: DISCONTINUED | OUTPATIENT
Start: 2018-01-23 | End: 2018-02-02 | Stop reason: HOSPADM

## 2018-01-22 RX ORDER — DOCUSATE SODIUM 100 MG/1
100 CAPSULE, LIQUID FILLED ORAL 2 TIMES DAILY
Status: DISCONTINUED | OUTPATIENT
Start: 2018-01-22 | End: 2018-01-24

## 2018-01-22 RX ORDER — OXYCODONE AND ACETAMINOPHEN 7.5; 325 MG/1; MG/1
1 TABLET ORAL EVERY 4 HOURS PRN
Status: DISCONTINUED | OUTPATIENT
Start: 2018-01-22 | End: 2018-01-23

## 2018-01-22 RX ORDER — BISACODYL 10 MG
10 SUPPOSITORY, RECTAL RECTAL DAILY PRN
Status: DISCONTINUED | OUTPATIENT
Start: 2018-01-22 | End: 2018-02-02 | Stop reason: HOSPADM

## 2018-01-22 RX ORDER — PREGABALIN 50 MG/1
50 CAPSULE ORAL DAILY
Status: DISCONTINUED | OUTPATIENT
Start: 2018-01-23 | End: 2018-02-02 | Stop reason: HOSPADM

## 2018-01-22 RX ORDER — FOLIC ACID 1 MG/1
1 TABLET ORAL DAILY
Status: DISCONTINUED | OUTPATIENT
Start: 2018-01-23 | End: 2018-02-02 | Stop reason: HOSPADM

## 2018-01-22 RX ORDER — FLUTICASONE PROPIONATE 50 MCG
2 SPRAY, SUSPENSION (ML) NASAL DAILY
Status: DISCONTINUED | OUTPATIENT
Start: 2018-01-23 | End: 2018-02-02 | Stop reason: HOSPADM

## 2018-01-22 RX ORDER — ACETAMINOPHEN 325 MG/1
650 TABLET ORAL EVERY 4 HOURS PRN
Status: DISCONTINUED | OUTPATIENT
Start: 2018-01-22 | End: 2018-02-02 | Stop reason: HOSPADM

## 2018-01-22 RX ORDER — VALACYCLOVIR HYDROCHLORIDE 500 MG/1
500 TABLET, FILM COATED ORAL DAILY
Status: ON HOLD | COMMUNITY
End: 2018-02-02 | Stop reason: HOSPADM

## 2018-01-22 RX ORDER — SODIUM PHOSPHATE, DIBASIC AND SODIUM PHOSPHATE, MONOBASIC 7; 19 G/133ML; G/133ML
1 ENEMA RECTAL
Status: ACTIVE | OUTPATIENT
Start: 2018-01-22 | End: 2018-01-22

## 2018-01-22 RX ORDER — EZETIMIBE 10 MG/1
10 TABLET ORAL DAILY
Status: DISCONTINUED | OUTPATIENT
Start: 2018-01-22 | End: 2018-01-22 | Stop reason: RX

## 2018-01-22 RX ORDER — SPIRONOLACTONE 25 MG/1
25 TABLET ORAL DAILY
Status: DISCONTINUED | OUTPATIENT
Start: 2018-01-23 | End: 2018-02-02 | Stop reason: HOSPADM

## 2018-01-22 RX ORDER — ACYCLOVIR 200 MG/1
400 CAPSULE ORAL 3 TIMES DAILY
Status: DISCONTINUED | OUTPATIENT
Start: 2018-01-22 | End: 2018-01-22

## 2018-01-22 RX ORDER — OXYCODONE AND ACETAMINOPHEN 7.5; 325 MG/1; MG/1
1 TABLET ORAL EVERY 4 HOURS PRN
Qty: 60 TABLET | Refills: 0
Start: 2018-01-22 | End: 2018-01-27

## 2018-01-22 RX ORDER — POLYETHYLENE GLYCOL 3350 17 G/17G
17 POWDER, FOR SOLUTION ORAL DAILY
Status: DISCONTINUED | OUTPATIENT
Start: 2018-01-22 | End: 2018-01-24

## 2018-01-22 RX ORDER — NITROGLYCERIN 0.4 MG/1
0.4 TABLET SUBLINGUAL EVERY 5 MIN PRN
Status: DISCONTINUED | OUTPATIENT
Start: 2018-01-22 | End: 2018-02-02 | Stop reason: HOSPADM

## 2018-01-22 RX ORDER — CLONIDINE HYDROCHLORIDE 0.1 MG/1
0.1 TABLET ORAL EVERY 4 HOURS PRN
Status: DISCONTINUED | OUTPATIENT
Start: 2018-01-22 | End: 2018-01-23

## 2018-01-22 RX ORDER — FUROSEMIDE 20 MG/1
40 TABLET ORAL DAILY
Status: DISCONTINUED | OUTPATIENT
Start: 2018-01-23 | End: 2018-02-02 | Stop reason: HOSPADM

## 2018-01-22 RX ORDER — ERGOCALCIFEROL 1.25 MG/1
50000 CAPSULE ORAL WEEKLY
Status: DISCONTINUED | OUTPATIENT
Start: 2018-01-22 | End: 2018-02-02 | Stop reason: HOSPADM

## 2018-01-22 RX ADMIN — CARVEDILOL 12.5 MG: 6.25 TABLET, FILM COATED ORAL at 08:24

## 2018-01-22 RX ADMIN — CARVEDILOL 12.5 MG: 12.5 TABLET, FILM COATED ORAL at 20:56

## 2018-01-22 RX ADMIN — PREGABALIN 50 MG: 50 CAPSULE ORAL at 08:25

## 2018-01-22 RX ADMIN — ISOSORBIDE MONONITRATE 30 MG: 30 TABLET, EXTENDED RELEASE ORAL at 08:24

## 2018-01-22 RX ADMIN — DOCUSATE SODIUM 100 MG: 100 CAPSULE, LIQUID FILLED ORAL at 20:56

## 2018-01-22 RX ADMIN — FOLIC ACID 1 MG: 1 TABLET ORAL at 08:25

## 2018-01-22 RX ADMIN — OXYCODONE HYDROCHLORIDE AND ACETAMINOPHEN 1 TABLET: 7.5; 325 TABLET ORAL at 20:55

## 2018-01-22 RX ADMIN — OXYCODONE HYDROCHLORIDE AND ACETAMINOPHEN 1 TABLET: 7.5; 325 TABLET ORAL at 08:24

## 2018-01-22 RX ADMIN — FLUTICASONE PROPIONATE 2 SPRAY: 50 SPRAY, METERED NASAL at 08:25

## 2018-01-22 RX ADMIN — LEVOTHYROXINE SODIUM 75 MCG: 75 TABLET ORAL at 05:33

## 2018-01-22 RX ADMIN — PREDNISONE 2 MG: 1 TABLET ORAL at 08:24

## 2018-01-22 RX ADMIN — OXYCODONE HYDROCHLORIDE AND ACETAMINOPHEN 1 TABLET: 7.5; 325 TABLET ORAL at 03:52

## 2018-01-22 RX ADMIN — SPIRONOLACTONE 25 MG: 25 TABLET ORAL at 08:24

## 2018-01-22 RX ADMIN — TICAGRELOR 90 MG: 90 TABLET ORAL at 20:56

## 2018-01-22 RX ADMIN — OXYCODONE HYDROCHLORIDE AND ACETAMINOPHEN 1 TABLET: 7.5; 325 TABLET ORAL at 17:03

## 2018-01-22 RX ADMIN — CETIRIZINE HYDROCHLORIDE 10 MG: 10 TABLET, FILM COATED ORAL at 08:24

## 2018-01-22 RX ADMIN — ERGOCALCIFEROL 50000 UNITS: 1.25 CAPSULE ORAL at 17:08

## 2018-01-22 RX ADMIN — PANTOPRAZOLE SODIUM 40 MG: 40 TABLET, DELAYED RELEASE ORAL at 08:25

## 2018-01-22 RX ADMIN — MAGNESIUM HYDROXIDE 10 ML: 2400 SUSPENSION ORAL at 08:24

## 2018-01-22 RX ADMIN — OXYCODONE HYDROCHLORIDE AND ACETAMINOPHEN 1 TABLET: 7.5; 325 TABLET ORAL at 12:05

## 2018-01-22 RX ADMIN — POLYETHYLENE GLYCOL 3350 17 G: 17 POWDER, FOR SOLUTION ORAL at 17:06

## 2018-01-22 ASSESSMENT — PAIN SCALES - GENERAL
PAINLEVEL_OUTOF10: 2
PAINLEVEL_OUTOF10: 8
PAINLEVEL_OUTOF10: 7
PAINLEVEL_OUTOF10: 1

## 2018-01-22 NOTE — PROGRESS NOTES
Orthopedic Spine Progress Note    Tawny Shin  62 y.o.  female  Subjective     Post-Operative Day # 3,1    Systemic or Specific Complaints:     Pain better controlled today, left leg pain better weakness same. No other changes. No BM    Objective     Vital signs in last 24 hours:  Temp:  [98.2 °F (36.8 °C)-98.6 °F (37 °C)] 98.2 °F (36.8 °C)  Heart Rate:  [68-73] 73  Resp:  [16-18] 16  BP: (139-160)/(54-96) 160/96    Physical Exam:    A&O x3  B.LE NVI   Abdomen soft  Dressing CDI    Diagnostic Data:  CBC:    Results from last 7 days  Lab Units 01/18/18  0447   WBC 10*3/mm3 6.43   RBC 10*6/mm3 4.01*   HEMOGLOBIN g/dL 11.9*   HEMATOCRIT % 36.0*   PLATELETS 10*3/mm3 155          Assessment/Plan     1. Status Post LLIF L3-L5 POD 4, Zee/PSF L3-L5 POD 2      Plan:  1. PT/OT today   2. Continue current pain medications   3. Knee immobilizer during ambulation   4. LSO when OOB  5. Rehab consult   6: Relistor added       VIRGIL Coy

## 2018-01-22 NOTE — DISCHARGE SUMMARY
[facility]  VIRGIL Coy  DISCHARGE SUMMARY  Patient ID:  Tawny Shin  [id number]  62 y.o.  1955    Admit date: 1/17/2018    Discharge date and time: 1/22/2018    Admitting Physician: Fortino Oropeza MD     Indication for Admission: Lumbar stenosis     Admission Diagnoses: Lumbago of multiple sites in spine with sciatica [M54.40]  Spondylolisthesis of lumbar region [M43.16]    Discharge Diagnoses: Lumbago of multiple sites in spine with sciatica [M54.40]  Spondylolisthesis of lumbar region [M43.16]    Procedures: LLIF,PSF L3-L5    Problem List: Active Problems:    Lumbago of multiple sites in spine with sciatica    Spondylolisthesis of lumbar region      Consults: none    Admission Condition: good    Discharged Condition: good    Hospital Course: Stable     Disposition: home    Patient Instructions:    Tawny Shin   Home Medication Instructions [id number]    Printed on:01/22/18 1877   Medication Information                      BRILINTA 90 MG tablet tablet  Take 90 mg by mouth 2 (Two) Times a Day.             carvedilol (COREG) 12.5 MG tablet  Take 12.5 mg by mouth 2 (Two) Times a Day With Meals.             cetirizine (zyrTEC) 10 MG tablet  Take 10 mg by mouth daily              CloNIDine (CATAPRES) 0.1 MG tablet  Take 0.2 mg by mouth Every Night.             fluticasone (FLONASE) 50 MCG/ACT nasal spray  2 sprays into each nostril Daily.             folic acid (FOLVITE) 1 MG tablet  Take 1 mg by mouth daily.               furosemide (LASIX) 40 MG tablet  Take 40 mg by mouth daily.             HUMIRA PEN 40 MG/0.8ML Pen-injector Kit  Inject 40 mg under the skin Take As Directed. EVERY 10 DAYS             levothyroxine (SYNTHROID, LEVOTHROID) 75 MCG tablet  Take 75 mcg by mouth daily.             LYRICA 50 MG capsule  Take 50 mg by mouth Daily.             MULTIPLE VITAMIN PO  Take 1 tablet by mouth daily.               nitroglycerin (NITROSTAT) 0.4 MG SL tablet  Place 1  tablet under the tongue every 5 minutes as needed for Chest pain             oxyCODONE-acetaminophen (PERCOCET) 7.5-325 MG per tablet  Take 1 tablet by mouth Every 4 (Four) Hours As Needed for Moderate Pain  for up to 5 days.             pantoprazole (PROTONIX) 40 MG EC tablet  Take 40 mg by mouth Daily.             PROLIA 60 MG/ML solution syringe  INJECT 60MG SUB-Q EVERY 6 MONTHS             spironolactone (ALDACTONE) 25 MG tablet  Take 25 mg by mouth Daily.             valACYclovir (VALTREX) 500 MG tablet  Take 500 mg by mouth Daily.             vitamin D (ERGOCALCIFEROL) 85043 units capsule capsule  Take 50,000 Units by mouth Every 7 (Seven) Days.             ZETIA 10 MG tablet  Take 10 mg by mouth Daily.               Activity: Avoid bending lifting or twisting, no driving while taking narcotic pain medication, walk 10-15 minutes 2-3 times daily, LSO when OOB, Knee immobilizer for ambulation  Diet: regular diet  Wound Care: Leave flank incision CDI, dressing changes PRN to posterior dressing   Other: Contact [facility] [place] office with questions or concerns    Follow-up with Dr Oropeza or PA's in 2 weeks.    Electronically signed by VIRGIL Coy 1:56 PM 1/22/2018

## 2018-01-22 NOTE — PLAN OF CARE
Problem: Patient Care Overview (Adult)  Goal: Plan of Care Review  Outcome: Ongoing (interventions implemented as appropriate)   01/22/18 1440   Coping/Psychosocial Response Interventions   Plan Of Care Reviewed With patient   Patient Care Overview   Progress improving   Outcome Evaluation   Outcome Summary/Follow up Plan No neuro changes. Safety maintained. PRN pain medication given upon request. Lower back dressing has a small amount of drainage. LSO brace worn when out of bed. Medicated for BM. [facility]     Goal: Adult Individualization and Mutuality  Outcome: Ongoing (interventions implemented as appropriate)    Goal: Discharge Needs Assessment  Outcome: Ongoing (interventions implemented as appropriate)      Problem: Laminectomy/Foraminotomy/Discectomy (Adult)  Goal: Signs and Symptoms of Listed Potential Problems Will be Absent or Manageable (Laminectomy/Foraminotomy/Discectomy)  Outcome: Ongoing (interventions implemented as appropriate)      Problem: Fall Risk (Adult)  Goal: Absence of Falls  Outcome: Ongoing (interventions implemented as appropriate)

## 2018-01-22 NOTE — PLAN OF CARE
Problem: Patient Care Overview (Adult)  Goal: Plan of Care Review  Outcome: Ongoing (interventions implemented as appropriate)   01/22/18 0240   Coping/Psychosocial Response Interventions   Plan Of Care Reviewed With patient   Patient Care Overview   Progress improving   Outcome Evaluation   Outcome Summary/Follow up Plan Medicated for c/o pain, relief noted. Con't to have drainage to drsg on back incision. Numbness to left leg con't. Con't to monitor. Resting well.     Goal: Adult Individualization and Mutuality  Outcome: Ongoing (interventions implemented as appropriate)    Goal: Discharge Needs Assessment  Outcome: Ongoing (interventions implemented as appropriate)      Problem: Perioperative Period (Adult)  Goal: Signs and Symptoms of Listed Potential Problems Will be Absent or Manageable (Perioperative Period)  Outcome: Ongoing (interventions implemented as appropriate)      Problem: Laminectomy/Foraminotomy/Discectomy (Adult)  Goal: Signs and Symptoms of Listed Potential Problems Will be Absent or Manageable (Laminectomy/Foraminotomy/Discectomy)  Outcome: Ongoing (interventions implemented as appropriate)      Problem: Fall Risk (Adult)  Goal: Absence of Falls  Outcome: Ongoing (interventions implemented as appropriate)

## 2018-01-22 NOTE — PROGRESS NOTES
Continued Stay Note   [place]     Patient Name: Tawny Shin  MRN: [id number]  Today's Date: 1/22/2018    Admit Date: 1/17/2018          Discharge Plan       01/22/18 0804    Case Management/Social Work Plan    Plan [facility]    Additional Comments [facility] if MD is ready for patient to discharge. SW will follow for discharge orders.               Discharge Codes     None            KALYN Hernandez

## 2018-01-22 NOTE — PLAN OF CARE
Problem: Patient Care Overview (Adult)  Goal: Plan of Care Review  Outcome: Ongoing (interventions implemented as appropriate)   01/22/18 1149   Coping/Psychosocial Response Interventions   Plan Of Care Reviewed With patient   Patient Care Overview   Progress improving   Outcome Evaluation   Outcome Summary/Follow up Plan Pt. had no problems or issues with performing le adl task with ae, this rodriguez/l told pt. to ask for ae after rehab if she still needed it!       Problem: Inpatient Occupational Therapy  Goal: LB Dressing Goal LTG- OT  Outcome: Ongoing (interventions implemented as appropriate)   01/18/18 0843 01/22/18 1149   LB Dressing OT LTG   LB Dressing Goal OT LTG, Date Established 01/18/18 --    LB Dressing Goal OT LTG, Time to Achieve by discharge --    LB Dressing Goal OT LTG, [place] Level contact guard assist --    LB Dressing Goal OT LTG, Additional Goal AE PRN --    LB Dressing Goal OT LTG, Date Goal Reviewed --  01/22/18   LB Dressing Goal OT LTG, Outcome --  goal met

## 2018-01-22 NOTE — THERAPY DISCHARGE NOTE
Acute Care - Physical Therapy Discharge Summary  [facility]       Patient Name: Tawny Shin  : 1955  MRN: [id number]    Today's Date: 2018  Onset of Illness/Injury or Date of Surgery Date: 18    Date of Referral to PT: 18  Referring Physician: Dr. Oropeza      Admit Date: 2018      PT Recommendation and Plan    Visit Dx:    ICD-10-CM ICD-9-CM   1. Impaired mobility and ADLs Z74.09 799.89   2. Impaired mobility Z74.09 799.89             Outcome Measures       18 0926 18 1000 18 1536    How much help from another person do you currently need...    Turning from your back to your side while in flat bed without using bedrails?  3  -AH 3  -KINGS    Moving from lying on back to sitting on the side of a flat bed without bedrails?  3  -AH 3  -KINGS    Moving to and from a bed to a chair (including a wheelchair)?  3  -AH 3  -KINGS    Standing up from a chair using your arms (e.g., wheelchair, bedside chair)?  3  -AH 3  -KINGS    Climbing 3-5 steps with a railing?  1  - 1  -KINGS    To walk in hospital room?  3  -AH 3  -KINGS    AM-PAC 6 Clicks Score  16  -AH 16  -KINGS    How much help from another is currently needed...    Putting on and taking off regular lower body clothing? 4  -CJ      Bathing (including washing, rinsing, and drying) 2  -CJ      Toileting (which includes using toilet bed pan or urinal) 2  -CJ      Putting on and taking off regular upper body clothing 3  -CJ      Taking care of personal grooming (such as brushing teeth) 3  -CJ      Eating meals 4  -CJ      Score 18  -      Functional Assessment    Outcome Measure Options AM-PAC 6 Clicks Daily Activity (OT)  - AM-PAC 6 Clicks Basic Mobility (PT)  - AM-PAC 6 Clicks Basic Mobility (PT)  -KINGS      18 1300 18 1115       How much help from another person do you currently need...    Turning from your back to your side while in flat bed without using bedrails? 3  -KR      Moving from lying on back to sitting on the  side of a flat bed without bedrails? 3  -KR      Moving to and from a bed to a chair (including a wheelchair)? 2  -KR      Standing up from a chair using your arms (e.g., wheelchair, bedside chair)? 2  -KR      Climbing 3-5 steps with a railing? 1  -KR      To walk in hospital room? 3  -KR      AM-PAC 6 Clicks Score 14  -KR      How much help from another is currently needed...    Putting on and taking off regular lower body clothing?  2  -AC     Bathing (including washing, rinsing, and drying)  2  -AC     Toileting (which includes using toilet bed pan or urinal)  2  -AC     Putting on and taking off regular upper body clothing  3  -AC     Taking care of personal grooming (such as brushing teeth)  3  -AC     Eating meals  4  -AC     Score  16  -AC     Functional Assessment    Outcome Measure Options AM-PAC 6 Clicks Basic Mobility (PT)  -KR AM-PAC 6 Clicks Daily Activity (OT)  -AC       User Key  (r) = Recorded By, (t) = Taken By, (c) = Cosigned By    Initials Name Provider Type    AC Sebastián Howe, OTR/L Occupational Therapist    ADRIANA Chambers, SERENA Physical Therapy Assistant    RIVAS Elizalde, CONTI/L Occupational Therapy Assistant    KR Yue Hale, PT DPT Physical Therapist    KINGS Tierney, SERENA Physical Therapy Assistant                PT Charges       01/22/18 1112          Time Calculation    Start Time 1112  -KJ      Stop Time 1135  -KJ      Time Calculation (min) 23 min  -KJ      PT Received On 01/22/18  -KJ      PT Goal Re-Cert Due Date 01/30/18  -KJ      Time Calculation- PT    Total Timed Code Minutes- PT 23 minute(s)  -KJ        User Key  (r) = Recorded By, (t) = Taken By, (c) = Cosigned By    Initials Name Provider Type    AGUSTINA Paul, SERENA Physical Therapy Assistant                  IP PT Goals       01/22/18 1628 01/20/18 1302 01/18/18 1133    Bed Mobility PT LTG    Bed Mobility PT LTG, Date Established  01/20/18  -KR 01/18/18  -MS    Bed Mobility PT LTG, Time to Achieve    by discharge  -MS    Bed Mobility PT LTG, Activity Type   all bed mobility  -MS    Bed Mobility PT LTG, [place] Level   independent  -MS    Bed Mobility PT LTG, Date Goal Reviewed 01/22/18  -KJ      Bed Mobility PT LTG, Outcome goal not met  -KJ      Bed Mobility PT LTG, Reason Goal Not Met discharged from facility  -KJ      Transfer Training PT LTG    Transfer Training PT LTG, Date Established  01/20/18  -KR 01/18/18  -MS    Transfer Training PT LTG, Time to Achieve   by discharge  -MS    Transfer Training PT LTG, Activity Type   sit to stand/stand to sit  -MS    Transfer Training PT LTG, [place] Level   conditional independence  -MS    Transfer Training PT LTG, Assist Device   walker, rolling  -MS    Transfer Training PT  LTG, Date Goal Reviewed 01/22/18  -KJ      Transfer Training PT LTG, Outcome goal not met  -KJ      Transfer Training PT LTG, Reason Goal Not Met discharged from facility  -KJ      Gait Training PT LTG    Gait Training Goal PT LTG, Date Established  01/20/18  -KR 01/18/18  -MS    Gait Training Goal PT LTG, Time to Achieve   by discharge  -MS    Gait Training Goal PT LTG, [place] Level   contact guard assist  -MS    Gait Training Goal PT LTG, Assist Device   walker, rolling  -MS    Gait Training Goal PT LTG, Distance to Achieve   50ft clearing L foot 50% of the time  -MS    Gait Training Goal PT LTG, Date Goal Reviewed 01/22/18  -KJ      Gait Training Goal PT LTG, Outcome goal met  -KJ      Stair Training PT LTG    Stair Training Goal PT LTG, Date Established  01/20/18  -KR 01/18/18  -MS    Stair Training Goal PT LTG, Time to Achieve   by discharge  -MS    Stair Training Goal PT LTG, Number of Steps   3  -MS    Stair Training Goal PT LTG, [place] Level   contact guard assist  -MS    Stair Training Goal PT LTG, Assist Device   1 handrail  -MS    Stair Training Goal PT LTG, Date Goal Reviewed 01/22/18  -KJ      Stair Training Goal PT LTG, Outcome goal not met  -KJ      Stair  Training Goal PT LTG, Reason Goal Not Met discharged from facility  -KJ      Strength Goal PT LTG    Strength Goal PT LTG, Date Established  01/20/18  -KR 01/18/18  -MS    Strength Goal PT LTG, Time to Achieve   by discharge  -MS    Strength Goal PT LTG, Functional Goal   pt will demonstrate full AROM of left hip flexion  -MS    Strength Goal PT LTG, Date Goal Reviewed 01/22/18  -KJ      Strength Goal PT LTG, Outcome goal not met  -KJ      Strength Goal PT LTG, Reason Goal Not Met discharged from facility  -KJ        User Key  (r) = Recorded By, (t) = Taken By, (c) = Cosigned By    Initials Name Provider Type    AGUSTINA Paul PTA Physical Therapy Assistant    HERB Hale, PT DPT Physical Therapist    MS Nicole Olson, PT DPT Physical Therapist          Therapy Charges for Today     Code Description Service Date Service Provider Modifiers Qty    76072842205 HC GAIT TRAINING EA 15 MIN 1/22/2018 Emilee Paul PTA GP, KX 1    45791812215 HC PT THER PROC EA 15 MIN 1/22/2018 Emilee Paul PTA GP, KX 1          PT Discharge Summary  Anticipated Discharge Disposition: inpatient rehabilitation facility  Reason for Discharge: Discharge from facility  Outcomes Achieved: Refer to plan of care for updates on goals achieved  Discharge Destination: Inpatient rehabilitation facility      Emilee Paul PTA   1/22/2018

## 2018-01-23 PROBLEM — M43.16 SPONDYLOLISTHESIS OF LUMBAR REGION: Status: ACTIVE | Noted: 2018-01-23

## 2018-01-23 PROCEDURE — 97530 THERAPEUTIC ACTIVITIES: CPT

## 2018-01-23 PROCEDURE — 99223 1ST HOSP IP/OBS HIGH 75: CPT | Performed by: PSYCHIATRY & NEUROLOGY

## 2018-01-23 PROCEDURE — 97165 OT EVAL LOW COMPLEX 30 MIN: CPT

## 2018-01-23 PROCEDURE — 1180000000 HC REHAB R&B

## 2018-01-23 PROCEDURE — 97110 THERAPEUTIC EXERCISES: CPT

## 2018-01-23 PROCEDURE — 6370000000 HC RX 637 (ALT 250 FOR IP): Performed by: PSYCHIATRY & NEUROLOGY

## 2018-01-23 PROCEDURE — 97535 SELF CARE MNGMENT TRAINING: CPT

## 2018-01-23 PROCEDURE — 97116 GAIT TRAINING THERAPY: CPT

## 2018-01-23 PROCEDURE — 97161 PT EVAL LOW COMPLEX 20 MIN: CPT

## 2018-01-23 RX ORDER — CLONIDINE HYDROCHLORIDE 0.2 MG/1
0.2 TABLET ORAL EVERY EVENING
Status: DISCONTINUED | OUTPATIENT
Start: 2018-01-23 | End: 2018-02-02 | Stop reason: HOSPADM

## 2018-01-23 RX ORDER — OXYCODONE AND ACETAMINOPHEN 7.5; 325 MG/1; MG/1
1 TABLET ORAL
Status: DISCONTINUED | OUTPATIENT
Start: 2018-01-23 | End: 2018-02-02 | Stop reason: HOSPADM

## 2018-01-23 RX ORDER — TRAMADOL HYDROCHLORIDE 50 MG/1
50 TABLET ORAL 2 TIMES DAILY
Status: DISCONTINUED | OUTPATIENT
Start: 2018-01-23 | End: 2018-02-02 | Stop reason: HOSPADM

## 2018-01-23 RX ORDER — SODIUM PHOSPHATE, DIBASIC AND SODIUM PHOSPHATE, MONOBASIC 7; 19 G/133ML; G/133ML
1 ENEMA RECTAL PRN
Status: COMPLETED | OUTPATIENT
Start: 2018-01-23 | End: 2018-01-23

## 2018-01-23 RX ADMIN — BISACODYL 10 MG: 10 SUPPOSITORY RECTAL at 16:25

## 2018-01-23 RX ADMIN — CARVEDILOL 12.5 MG: 12.5 TABLET, FILM COATED ORAL at 21:49

## 2018-01-23 RX ADMIN — PREGABALIN 50 MG: 50 CAPSULE ORAL at 09:43

## 2018-01-23 RX ADMIN — CARVEDILOL 12.5 MG: 12.5 TABLET, FILM COATED ORAL at 09:43

## 2018-01-23 RX ADMIN — DOCUSATE SODIUM 100 MG: 100 CAPSULE, LIQUID FILLED ORAL at 21:49

## 2018-01-23 RX ADMIN — POLYETHYLENE GLYCOL 3350 17 G: 17 POWDER, FOR SOLUTION ORAL at 09:52

## 2018-01-23 RX ADMIN — CETIRIZINE HYDROCHLORIDE 10 MG: 10 TABLET, FILM COATED ORAL at 09:43

## 2018-01-23 RX ADMIN — TICAGRELOR 90 MG: 90 TABLET ORAL at 21:48

## 2018-01-23 RX ADMIN — LEVOTHYROXINE SODIUM 75 MCG: 50 TABLET ORAL at 06:05

## 2018-01-23 RX ADMIN — FUROSEMIDE 40 MG: 20 TABLET ORAL at 09:43

## 2018-01-23 RX ADMIN — VALACYCLOVIR 500 MG: 500 TABLET, FILM COATED ORAL at 09:43

## 2018-01-23 RX ADMIN — OXYCODONE HYDROCHLORIDE AND ACETAMINOPHEN 1 TABLET: 7.5; 325 TABLET ORAL at 21:49

## 2018-01-23 RX ADMIN — OXYCODONE HYDROCHLORIDE AND ACETAMINOPHEN 1 TABLET: 7.5; 325 TABLET ORAL at 11:07

## 2018-01-23 RX ADMIN — OXYCODONE HYDROCHLORIDE AND ACETAMINOPHEN 1 TABLET: 7.5; 325 TABLET ORAL at 06:05

## 2018-01-23 RX ADMIN — TRAMADOL HYDROCHLORIDE 50 MG: 50 TABLET, FILM COATED ORAL at 09:45

## 2018-01-23 RX ADMIN — FLUTICASONE PROPIONATE 2 SPRAY: 50 SPRAY, METERED NASAL at 09:44

## 2018-01-23 RX ADMIN — SODIUM PHOSPHATE 1 ENEMA: 7; 19 ENEMA RECTAL at 21:51

## 2018-01-23 RX ADMIN — SPIRONOLACTONE 25 MG: 25 TABLET, FILM COATED ORAL at 09:44

## 2018-01-23 RX ADMIN — CLONIDINE HYDROCHLORIDE 0.2 MG: 0.2 TABLET ORAL at 17:45

## 2018-01-23 RX ADMIN — OXYCODONE HYDROCHLORIDE AND ACETAMINOPHEN 1 TABLET: 7.5; 325 TABLET ORAL at 01:49

## 2018-01-23 RX ADMIN — DOCUSATE SODIUM 100 MG: 100 CAPSULE, LIQUID FILLED ORAL at 09:44

## 2018-01-23 RX ADMIN — FOLIC ACID 1 MG: 1 TABLET ORAL at 09:43

## 2018-01-23 RX ADMIN — TRAMADOL HYDROCHLORIDE 50 MG: 50 TABLET, FILM COATED ORAL at 21:49

## 2018-01-23 RX ADMIN — OXYCODONE HYDROCHLORIDE AND ACETAMINOPHEN 1 TABLET: 7.5; 325 TABLET ORAL at 14:06

## 2018-01-23 RX ADMIN — TICAGRELOR 90 MG: 90 TABLET ORAL at 09:43

## 2018-01-23 RX ADMIN — OXYCODONE HYDROCHLORIDE AND ACETAMINOPHEN 1 TABLET: 7.5; 325 TABLET ORAL at 17:40

## 2018-01-23 RX ADMIN — PANTOPRAZOLE SODIUM 40 MG: 40 TABLET, DELAYED RELEASE ORAL at 09:44

## 2018-01-23 ASSESSMENT — PAIN SCALES - GENERAL
PAINLEVEL_OUTOF10: 1
PAINLEVEL_OUTOF10: 8
PAINLEVEL_OUTOF10: 2
PAINLEVEL_OUTOF10: 4
PAINLEVEL_OUTOF10: 8
PAINLEVEL_OUTOF10: 8
PAINLEVEL_OUTOF10: 2
PAINLEVEL_OUTOF10: 6

## 2018-01-23 NOTE — PATIENT CARE CONFERENCE
Assistance: Bowel Level of Assistance: 0- Activity does not occur (Use this code only at admission)  Frequency of Accidents: Bowel Frequency of Accidents: 6 - No accidents: uses device    Wounds/Incisions/Ulcers: Incision healing well     Varinder Scale Score: 19    Pain: No pain concerns to address    Consultations/Labs/X-rays:   no    Family Education: Family available and participating in education     Fall Risk:  Christian Score: 48    Fall in the last week?no      Other Nursing Issues:    no        SOCIAL WORK/CASE MANAGEMENT  Assessment:    Discharge Plan   Estimated Length of Stay:10-14 days  Destination: home health    Pass: YES    Services at [address] at [address] made in the prior week:  1. EVAL COMPLETED 1/23/2018  2.  3.  4.  5.      Goals for following week:  1. Supervision with toilet transfers. 2.   3.   4.   5.     Factors facilitating achievement of predicted outcomes: motivated    Barriers to the achievement of predicted outcomes:     Team Members Present at Conference:  : Electronically Signed by Kelly Mortensen, Admissions/Discharge Coordinator 1/24/2018 11:47 AM  Occupational Therapist: DOREEN Jung/DEONDRE  Physical Therapist: Diamond Cordova PT,DPT  Speech Therapist: Ramona Milner 87, [employer]  Nurse: Jesus Shah RN   Nurse Manager:  Jesus Shah RN  Dietitian:  Quirino Montague MS, RD, LD  Rehab Director:  Finesse Rojo approve the established interdisciplinary plan of care as documented within the medical record of Russell Mayen.

## 2018-01-23 NOTE — H&P
mcg, Oral, Daily, Sammie Calderon MD, 75 mcg at 01/23/18 0605    pregabalin (LYRICA) capsule 50 mg, 50 mg, Oral, Daily, Sammie Calderon MD    nitroGLYCERIN (NITROSTAT) SL tablet 0.4 mg, 0.4 mg, Sublingual, Q5 Min PRN, Sammie Calderon MD    ticagrelor [facility]) tablet 90 mg, 90 mg, Oral, BID, Sammie Calderon MD, 90 mg at 01/22/18 2056    carvedilol (COREG) tablet 12.5 mg, 12.5 mg, Oral, BID, Sammie Calderon MD, 12.5 mg at 01/22/18 2056    cloNIDine (CATAPRES) tablet 0.1 mg, 0.1 mg, Oral, Q4H PRN, Sammie Calderon MD    fluticasone [facility]) 50 MCG/ACT nasal spray 2 spray, 2 spray, Nasal, Daily, Sammie Calderon MD    oxyCODONE-acetaminophen (PERCOCET) 7.5-325 MG per tablet 1 tablet, 1 tablet, Oral, Q4H PRN, Sammie Calderon MD, 1 tablet at 01/23/18 0605    spironolactone (ALDACTONE) tablet 25 mg, 25 mg, Oral, Daily, Sammie Calderon MD    vitamin D (ERGOCALCIFEROL) capsule 50,000 Units, 50,000 Units, Oral, Weekly, Sammie Calderon MD, 50,000 Units at 01/22/18 1708    acetaminophen (TYLENOL) tablet 650 mg, 650 mg, Oral, Q4H PRN, Sammie Calderon MD    magnesium hydroxide (MILK OF MAGNESIA) 400 MG/5ML suspension 30 mL, 30 mL, Oral, Daily PRN, Sammie Calderon MD    docusate sodium (COLACE) capsule 100 mg, 100 mg, Oral, BID, Sammie Calderon MD, 100 mg at 01/22/18 2056    bisacodyl (DULCOLAX) suppository 10 mg, 10 mg, Rectal, Daily PRN, Sammie Calderon MD    polyethylene glycol [facility]) packet 17 g, 17 g, Oral, Daily, Sammie Calderon MD, 17 g at 01/22/18 1706    valACYclovir (VALTREX) tablet 500 mg, 500 mg, Oral, Daily, Sammie Calderon MD    Allergies:  Amoxicillin; Lipitor; Niaspan [niacin er]; Penicillins; Relafen [nabumetone]; and Zocor [simvastatin]    Social History:   TOBACCO:   reports that she has never smoked. She has never used smokeless tobacco.  ETOH:   has no alcohol history on file.     Family History:       Problem Relation Age of Onset    Heart Attack Mother            Physical Exam:    Vitals: BP (!) 147/72   Pulse 69 Temp 98.4 °F (36.9 °C)   Resp 18   Ht 5' 6\" (1.676 m)   Wt 204 lb (92.5 kg)   SpO2 98%   Breastfeeding? No   BMI 32.93 kg/m²     Constitutional  well developed, well nourished. Eyes  conjunctiva normal.  Pupils react to light  Ear, nose, throat -hearing intact to finger rub No scars, masses, or lesions over external nose or ears, no atrophy of tongue  Neck-symmetric, no masses noted, no jugular vein distension  Respiration- chest wall appears symmetric, good expansion,   normal effort without use of accessory muscles  Cardiovascular- RRR without m,r,g  Musculoskeletal  no significant wasting of muscles noted, RA deformities in the hands  Extremities-no clubbing, cyanosis or edema  Skin  warm, dry, and intact. No rash, erythema, or pallor.   Psychiatric  mood, affect, and behavior appear normal.      Neurological exam  Awake, alert, fluent oriented x 3 appropriate affect  Attention and concentration appear appropriate  Recent and remote memory appears unremarkable  Speech normal without dysarthria  No clear issues with language of fund of knowledge    Cranial Nerve Exam   CN II- Visual fields grossly unremarkable  CN III, IV,VI-EOMI, No nystagmus, conjugate eye movements, no ptosis  CN VII-no facial assymetry  CN VIII-Hearing intact   CN IX and X- Palate elevates in midline  CN XI-good shoulder shrug  CN XII-Tongue midline with no fasciculations or fibrillations    Motor Exam  V/V throughout upper and lower extremities bilaterally except for 0/5 left hip adduction and 4/5 flexion, no cogwheeling, normal tone    Sensory Exam  Sensation intact to light touch and temperature upper and lower extremities bilaterally    Reflexes   2+ in the arms, absent in the legs  No Salazar's sign bilateral hands    Tremors- no tremors in hands or head noted    Gait  Not tested    Coordination  Finger to nose-unremarkable        CBC:   Recent Labs      01/22/18   1738   WBC  5.1   HGB  10.8*   PLT  177       BMP:  No care for active management of the multiple medical issues documented in the admission note    Appropriate therapy needed: The patient requires the active and ongoing therapeutic intervention of at least 2 therapeutic disciplines, one of which must be physical or occupational therapy and/or speech therapy    Intensive therapy: The patient requires and is reasonably expected to actively participate in at least 3 hours of therapy per day at least 5 days per week, and expected to make measurable improvements that will be of practical value to improve the patient's functional capacity or adaptation to impairments. In addition, therapy treatments will begin within 36 hours from midnight of the day of the patient's admission to the inpatient rehabilitation facility    Expected duration and frequency therapy: 180 minutes per day, 5 days per week    Interdisciplinary team: The patient demonstrates the need for an interdisciplinary team for active management of the following medical issues including ataxia, motor planning, balance, disease management, elimination, endurance, family training, education, independent ADLs, pain management, precautions, range of motion, safety, strength, and transfers    I have reviewed the preadmission screening documents and concur with the findings. I believe the patient meets criteria and is sufficiently stable to allow participation in the program. This requires an intensive level of therapy, close medical supervision, and an interdisciplinary team approach provided through an individualized plan of care. I approve admitting this patient for an intensive inpatient rehabilitation program.      Teja Zuniga.  Shaina Nunes MD

## 2018-01-23 NOTE — PLAN OF CARE
Problem: Inpatient Occupational Therapy  Goal: Transfer Training Goal 1 LTG- OT  Outcome: Unable to achieve outcome(s) by discharge Date Met: 01/23/18 01/20/18 1233 01/23/18 0728   Transfer Training OT LTG   Transfer Training OT LTG, Date Established 01/20/18 --    Transfer Training OT LTG, Time to Achieve by discharge --    Transfer Training OT LTG, Activity Type walk-in shower;shower chair;toilet --    Transfer Training OT LTG, [place] Level supervision required --    Transfer Training OT LTG, Assist Device walker, rolling;commode, bedside;shower chair --    Transfer Training OT LTG, Date Goal Reviewed --  01/23/18   Transfer Training OT LTG, Outcome --  goal not met   Transfer Training OT LTG, Reason Goal Not Met --  discharged from facility     Goal: Bathing Goal LTG- OT  Outcome: Unable to achieve outcome(s) by discharge Date Met: 01/23/18 01/18/18 0843 01/23/18 0728   Bathing OT LTG   Bathing Goal OT LTG, Date Established 01/18/18 --    Bathing Goal OT LTG, Time to Achieve by discharge --    Bathing Goal OT LTG, Activity Type lower body bathing --    Bathing Goal OT LTG, [place] Level supervision required --    Bathing Goal OT LTG, Assist Device shower chair --    Bathing Goal OT LTG, Date Goal Reviewed --  01/23/18   Bathing Goal OT LTG, Outcome --  goal not met   Bathing Goal OT LTG, Reason Goal Not Met --  discharged from facility     Goal: LB Dressing Goal LTG- OT  Outcome: Unable to achieve outcome(s) by discharge Date Met: 01/23/18 01/18/18 0843 01/23/18 0728   LB Dressing OT LTG   LB Dressing Goal OT LTG, Date Established 01/18/18 --    LB Dressing Goal OT LTG, Time to Achieve by discharge --    LB Dressing Goal OT LTG, [place] Level contact guard assist --    LB Dressing Goal OT LTG, Additional Goal AE PRN --    LB Dressing Goal OT LTG, Date Goal Reviewed --  01/23/18   LB Dressing Goal OT LTG, Outcome --  goal not met   LB Dressing Goal OT LTG, Reason Goal Not Met --  discharged  from facility

## 2018-01-23 NOTE — PROGRESS NOTES
Occupational Therapy   Occupational Therapy Initial Assessment  Date: 2018   Patient Name: Malika Young  MRN: [id number]     : 1955    Patient Diagnosis(es): There were no encounter diagnoses. has a past medical history of CAD (coronary artery disease); Cellulitis; History of blood transfusion; History of DVT (deep vein thrombosis); History of neutropenia; History of pulmonary fibrosis; Hx of blood clots; Hypercholesterolemia; Hypertension; Intrinsic asthma; Pacemaker; Rheumatoid arteritis; Sinus node dysfunction ([address] Staph aureus infection; Status post placement of implantable loop recorder; Syncope; and Thyroid disease. has a past surgical history that includes Diagnostic Cardiac Cath Lab Procedure; Cardiac catheterization (14  [facility]); Cardiac catheterization (1/26/15  [facility]); LEEP; Coronary angioplasty with stent (); and Pacemaker insertion (2016).     Treatment Diagnosis: L3-L5 laminectomy and post spinal fusion, Stage 2 on 2018      Restrictions  Restrictions/Precautions  Restrictions/Precautions: Weight Bearing, Fall Risk  Required Braces or Orthoses?: Yes  Lower Extremity Weight Bearing Restrictions  Right Lower Extremity Weight Bearing: Weight Bearing As Tolerated  Left Lower Extremity Weight Bearing: Weight Bearing As Tolerated  Required Braces or Orthoses  Spinal: Lumbar Corset  Left Lower Extremity Brace:  (knee immobilizer)  Position Activity Restriction  Spinal Precautions: No Bending, No Lifting, No Twisting    Subjective   General  Patient assessed for rehabilitation services?: Yes  Pain Assessment  Pain Level: 2  Oxygen Therapy  SpO2: 93 %  Social/Functional History  Social/Functional History  Lives With: Spouse  Type of Home: House  Home Layout: Two level  Home Access: Stairs to enter without rails  Entrance Stairs - Number of Steps: 2  Bathroom Shower/Tub: Walk-in shower, Tub/Shower unit (Small lip )  Bathroom Toilet: Handicap height  Home Equipment: 4 wheeled

## 2018-01-23 NOTE — PROGRESS NOTES
PARTICIPATE IN AT LEAST 3 HOURS OF INTENSIVE THERAPY PER DAY AT LEAST 5 DAYS PER WEEK, AND BE EXPECTED TO MAKE MEASURABLE IMPROVEMENT THAT WILL BE OF PRACTICAL VALUE TO IMPROVE THE PATIENT'S FUNCTIONAL CAPACITY OR ADAPTATION TO IMPAIRMENTS. PATIENT GOAL FOR REHAB:  RETURN TO PRIOR LEVEL OF FUNCTION   DISCHARGE PLANS:     ELOS:  2 WEEKS        IRF/CAT    ROLLING  Roll Left and Right  Assistance Needed: Adaptive equipment  Physical Assistance Level: No physical assistance  CARE Score: 6  SIT TO SUPINE  Sit to Lying  Assistance Needed: Adaptive equipment, Incidental touching  Physical Assistance Level: Less than 25%  CARE Score: 3  SUPINE TO SIT  Lying to Sitting on Side of Bed  Assistance Needed:  Adaptive equipment  Physical Assistance Level: No physical assistance  CARE Score: 6  SIT TO STAND  Sit to Stand  Assistance Needed: Incidental touching  Physical Assistance Level: No physical assistance  CARE Score: 4  TRANSFERS  Chair/Bed-to-Chair Transfer  Assistance Needed: Incidental touching  Physical Assistance Level: No physical assistance  CARE Score: 4  CAR TRANSFERS  Car Transfer  Reason if not Attempted: Safety concerns  CARE Score: 88  WALK 10 FT  Walk 10 Feet  Assistance Needed: Incidental touching  Physical Assistance Level: No physical assistance  CARE Score: 4  WALK 50 FT  Walk 50 Feet with Two Turns  Assistance Needed: Incidental touching  Physical Assistance Level: No physical assistance  CARE Score: 4  WALK 150 FT  Walk 150 Feet  Assistance Needed: Incidental touching  Physical Assistance Level: No physical assistance  CARE Score: 4  WALK 10 FT UNEVEN SURFACES  Walking 10 Feet on Uneven Surfaces  Reason if not Attempted: Safety concerns  CARE Score: 88  1 STEP  1 Step (Curb)  Reason if not Attempted: Safety concerns  CARE Score: 88  Discharge Goal: Supervision or touching assistance  4 STEPS  4 Steps  Reason if not Attempted: Safety concerns  CARE Score: 88  12 STEPS  12 Steps  Reason if not Attempted: Safety concerns  CARE Score: 88  PICKING UP OBJECT  Picking Up Object  Reason if not Attempted: Safety concerns  CARE Score: 88  WHEELCHAIR 50 FT  Wheel 50 Feet with Two Turns  Assistance Needed: Supervision  Physical Assistance Level: No physical assistance  CARE Score: 4  WHEELCHAIR 150 FT  Wheel 150 Feet  Assistance Needed: Supervision  Physical Assistance Level: No physical assistance  CARE Score: 4      LAST TREATMENT TIME  PT Individual Minutes  Time In: 1300  Time Out: [phone]  Minutes: 45            Electronically signed by Stef Agustin on 1/23/2018 at 2:32 PM

## 2018-01-23 NOTE — THERAPY DISCHARGE NOTE
Acute Care - Occupational Therapy Discharge Summary  [facility]     Patient Name: Tawny Shin  : 1955  MRN: [id number]    Today's Date: 2018  Onset of Illness/Injury or Date of Surgery Date: 18    Date of Referral to OT: 18  Referring Physician: Dr. Oropeza      Admit Date: 2018        OT Recommendation and Plan    Visit Dx:    ICD-10-CM ICD-9-CM   1. Impaired mobility and ADLs Z74.09 799.89   2. Impaired mobility Z74.09 799.89                     OT Goals       18 0728 18 1149 18 1233    Transfer Training OT LTG    Transfer Training OT LTG, Date Established   18  -AC    Transfer Training OT LTG, Time to Achieve   by discharge  -AC    Transfer Training OT LTG, Activity Type   walk-in shower;shower chair;toilet  -AC    Transfer Training OT LTG, [place] Level   supervision required  -AC    Transfer Training OT LTG, Assist Device   walker, rolling;commode, bedside;shower chair  -AC    Transfer Training OT LTG, Date Goal Reviewed 18  -TS  18  -AC    Transfer Training OT LTG, Outcome goal not met  -TS  goal revised  -AC    Transfer Training OT LTG, Reason Goal Not Met discharged from facility  -TS      Bathing OT LTG    Bathing Goal OT LTG, Date Goal Reviewed 18  -TS  18  -AC    Bathing Goal OT LTG, Outcome goal not met  -TS  goal ongoing  -AC    Bathing Goal OT LTG, Reason Goal Not Met discharged from facility  -TS      LB Dressing OT LTG    LB Dressing Goal OT LTG, Date Goal Reviewed 18  -TS 18  -CJ 18  -AC    LB Dressing Goal OT LTG, Outcome goal not met  -TS goal met  -CJ goal ongoing  -AC    LB Dressing Goal OT LTG, Reason Goal Not Met discharged from facility  -TS        18 0843          Transfer Training OT LTG    Transfer Training OT LTG, Date Established 18  -AC      Transfer Training OT LTG, Time to Achieve by discharge  -AC      Transfer Training OT LTG, Activity Type shower chair;walk-in  shower;toilet  -AC      Transfer Training OT LTG, [place] Level contact guard assist  -AC      Transfer Training OT LTG, Assist Device walker, rolling;commode, bedside;shower chair  -AC      Bathing OT LTG    Bathing Goal OT LTG, Date Established 01/18/18  -AC      Bathing Goal OT LTG, Time to Achieve by discharge  -AC      Bathing Goal OT LTG, Activity Type lower body bathing  -AC      Bathing Goal OT LTG, [place] Level supervision required  -AC      Bathing Goal OT LTG, Assist Device shower chair  -AC      LB Dressing OT LTG    LB Dressing Goal OT LTG, Date Established 01/18/18  -AC      LB Dressing Goal OT LTG, Time to Achieve by discharge  -AC      LB Dressing Goal OT LTG, [place] Level contact guard assist  -AC      LB Dressing Goal OT LTG, Additional Goal AE PRN  -AC        User Key  (r) = Recorded By, (t) = Taken By, (c) = Cosigned By    Initials Name Provider Type    AC Sebastián Howe, OTR/L Occupational Therapist    RIVAS Elizalde CONTI/L Occupational Therapy Assistant    TS PARK JesusA/L Occupational Therapy Assistant                Outcome Measures       01/22/18 0926 01/21/18 1000 01/20/18 1536    How much help from another person do you currently need...    Turning from your back to your side while in flat bed without using bedrails?  3  -AH 3  -KINGS    Moving from lying on back to sitting on the side of a flat bed without bedrails?  3  - 3  -KINGS    Moving to and from a bed to a chair (including a wheelchair)?  3  - 3  -KINGS    Standing up from a chair using your arms (e.g., wheelchair, bedside chair)?  3  - 3  -KINGS    Climbing 3-5 steps with a railing?  1  - 1  -KINGS    To walk in hospital room?  3  - 3  -KINGS    AM-PAC 6 Clicks Score  16  - 16  -KINGS    How much help from another is currently needed...    Putting on and taking off regular lower body clothing? 4  -CJ      Bathing (including washing, rinsing, and drying) 2  -CJ      Toileting (which includes using  toilet bed pan or urinal) 2  -CJ      Putting on and taking off regular upper body clothing 3  -CJ      Taking care of personal grooming (such as brushing teeth) 3  -CJ      Eating meals 4  -CJ      Score 18  -CJ      Functional Assessment    Outcome Measure Options AM-PAC 6 Clicks Daily Activity (OT)  - AM-PAC 6 Clicks Basic Mobility (PT)  - AM-PAC 6 Clicks Basic Mobility (PT)  -KINGS      01/20/18 1300 01/20/18 1115       How much help from another person do you currently need...    Turning from your back to your side while in flat bed without using bedrails? 3  -KR      Moving from lying on back to sitting on the side of a flat bed without bedrails? 3  -KR      Moving to and from a bed to a chair (including a wheelchair)? 2  -KR      Standing up from a chair using your arms (e.g., wheelchair, bedside chair)? 2  -KR      Climbing 3-5 steps with a railing? 1  -KR      To walk in hospital room? 3  -KR      AM-PAC 6 Clicks Score 14  -KR      How much help from another is currently needed...    Putting on and taking off regular lower body clothing?  2  -AC     Bathing (including washing, rinsing, and drying)  2  -AC     Toileting (which includes using toilet bed pan or urinal)  2  -AC     Putting on and taking off regular upper body clothing  3  -AC     Taking care of personal grooming (such as brushing teeth)  3  -AC     Eating meals  4  -AC     Score  16  -AC     Functional Assessment    Outcome Measure Options AM-PAC 6 Clicks Basic Mobility (PT)  -KR AM-PAC 6 Clicks Daily Activity (OT)  -AC       User Key  (r) = Recorded By, (t) = Taken By, (c) = Cosigned By    Initials Name Provider Type    AC Sebastián Howe, OTR/L Occupational Therapist    ADRIANA Chambers, PTA Physical Therapy Assistant    RIVAS Elizalde CONTI/L Occupational Therapy Assistant    HERB Hale, PT DPT Physical Therapist    KINGS Tierney, PTA Physical Therapy Assistant              OT Discharge Summary  Reason for Discharge:  Discharge from facility  Outcomes Achieved: Refer to plan of care for updates on goals achieved  Discharge Destination: SNF      GUALBERTO Narvaez  1/23/2018

## 2018-01-23 NOTE — PLAN OF CARE
[address] [facility] TREATMENT PLAN      Leesa [place]    : 1955  Acct #: [de-identified]  MRN: [id number]   PHYSICIAN:  Micaela Hope MD  Primary Problem    Patient Active Problem List   Diagnosis    CAD (coronary artery disease)    Hypertension    Rheumatoid arteritis    History of pulmonary fibrosis    History of DVT (deep vein thrombosis)    Thyroid disease    Intrinsic asthma    Hypercholesterolemia    History of neutropenia    Syncope, cardiogenic    Near syncope    Status post placement of implantable loop recorder    Left carotid bruit    S/P coronary artery stent placement    Chest pain    Pacemaker    Sinus node dysfunction ([address]    Lumbar stenosis with neurogenic claudication    Spondylolisthesis of lumbar region       Rehabilitation Diagnosis:     Lumbar stenosis with neurogenic claudication [M48.062]  Lumbar stenosis with neurogenic claudication [M48.062]       ADMIT DATE:2018   CARE PLAN     NURSING:  Leesa Carmichael while on this unit will:     [] Be continent of bowel and bladder      [x] Have an adequate number of bowel movements   [] Urinate with no urinary retention >300ml in bladder   [] Complete bladder protocol with asif removal   [] Maintain O2 SATs at ___%   [x] Have pain managed while on ARU        [x] Be pain free by discharge    [x] Have no skin breakdown while on ARU   [] Have improved skin integrity via wound measurements   [x] Have no signs/symptoms of infection at the wound site  [x] Be free from injury during hospitalization   [] Complete education with patient/family with understanding demonstrated for:  [] Adjustment   [] Other:   Nursing interventions may include bowel/bladder training, education for medical assistive devices, medication education, O2 saturation management, energy conservation, stress management techniques, fall prevention, alarms protocol, seating and positioning, skin/wound care, pressure relief instruction,dressing changes, the nature of this patient's medical involvement, this patient will be seen 15 hours per week (900 minutes within a 7 day period). Treatments may include therapeutic exercises, gait training, neuromuscular re-ed, transfer training, community reintegration, bed mobility, w/c mobility and training, self care, home mgmt, cognitive training, energy conservation,dysphagia tx, speech/language/communication therapy, group therapy, and patient/family education. In addition, dietician/nutritionist may monitor calorie count as well as intake and collaboratively work with SLP on dietary upgrades. Neuropsychology/Psychology may evaluate and provide necessary support. Medical issues being managed closely and that require 24 hour availability of a physician:   [] Swallowing Precautions  [x] Bowel/Bladder Fx  [] Weight bearing precautions   [x] Wound Care    [x] Pain Mgmt   [] Infection Protection   [x] DVT Prophylaxis   [] Fall Precautions  [x] Fluid/Electrolyte/Nutrition Balance   [] Voice Protection   [] Respiratory  [] Other:    Medical Prognosis: [] Good  [x] Fair    [] Guarded   Total expected IRF days:  13  Anticipated discharge destination:    [] Home Independently   [x] Home with supervision    []SNF     [] Other                                           Physician anticipated functional outcomes: Independent househould ambulation with assistive device  IPOC brief synthesis: Acute inpatient rehabilitation with occupational   physical therapy , 180 minutes, 5 every 7   days will address basic and advancing mobility with self-care   instruction and adaptive equipment training. Caregiver education will   be offered. Expected length of stay prior to the supervised level of   functional discharge to home with home health is 13 days. Assessment and Plan:  1. Lumbar spondylolisthesis and stenosis status post 2-stage laminectomy and fusion with left leg adduction weaknessPT/OT/pain control  2.  Constipation likely

## 2018-01-24 ENCOUNTER — APPOINTMENT (OUTPATIENT)
Dept: GENERAL RADIOLOGY | Age: 63
DRG: 560 | End: 2018-01-24
Attending: PSYCHIATRY & NEUROLOGY
Payer: MEDICARE

## 2018-01-24 PROCEDURE — 97530 THERAPEUTIC ACTIVITIES: CPT

## 2018-01-24 PROCEDURE — 6370000000 HC RX 637 (ALT 250 FOR IP): Performed by: PSYCHIATRY & NEUROLOGY

## 2018-01-24 PROCEDURE — 97110 THERAPEUTIC EXERCISES: CPT

## 2018-01-24 PROCEDURE — 97535 SELF CARE MNGMENT TRAINING: CPT

## 2018-01-24 PROCEDURE — 97116 GAIT TRAINING THERAPY: CPT

## 2018-01-24 PROCEDURE — 1180000000 HC REHAB R&B

## 2018-01-24 PROCEDURE — 6360000002 HC RX W HCPCS: Performed by: NURSE PRACTITIONER

## 2018-01-24 PROCEDURE — 99233 SBSQ HOSP IP/OBS HIGH 50: CPT | Performed by: PSYCHIATRY & NEUROLOGY

## 2018-01-24 PROCEDURE — 6370000000 HC RX 637 (ALT 250 FOR IP): Performed by: NURSE PRACTITIONER

## 2018-01-24 PROCEDURE — 74018 RADEX ABDOMEN 1 VIEW: CPT

## 2018-01-24 RX ORDER — POLYETHYLENE GLYCOL 3350 17 G/17G
17 POWDER, FOR SOLUTION ORAL 2 TIMES DAILY
Status: DISCONTINUED | OUTPATIENT
Start: 2018-01-24 | End: 2018-02-02 | Stop reason: HOSPADM

## 2018-01-24 RX ORDER — DOCUSATE SODIUM 100 MG/1
200 CAPSULE, LIQUID FILLED ORAL 2 TIMES DAILY
Status: DISCONTINUED | OUTPATIENT
Start: 2018-01-24 | End: 2018-02-02 | Stop reason: HOSPADM

## 2018-01-24 RX ORDER — LACTULOSE 10 G/15ML
20 SOLUTION ORAL 3 TIMES DAILY
Status: DISCONTINUED | OUTPATIENT
Start: 2018-01-24 | End: 2018-02-02 | Stop reason: HOSPADM

## 2018-01-24 RX ADMIN — LEVOTHYROXINE SODIUM 75 MCG: 50 TABLET ORAL at 05:39

## 2018-01-24 RX ADMIN — FUROSEMIDE 40 MG: 20 TABLET ORAL at 09:25

## 2018-01-24 RX ADMIN — METHYLNALTREXONE BROMIDE 12 MG: 12 INJECTION, SOLUTION SUBCUTANEOUS at 11:57

## 2018-01-24 RX ADMIN — OXYCODONE HYDROCHLORIDE AND ACETAMINOPHEN 1 TABLET: 7.5; 325 TABLET ORAL at 21:33

## 2018-01-24 RX ADMIN — FLUTICASONE PROPIONATE 2 SPRAY: 50 SPRAY, METERED NASAL at 09:27

## 2018-01-24 RX ADMIN — CARVEDILOL 12.5 MG: 12.5 TABLET, FILM COATED ORAL at 21:34

## 2018-01-24 RX ADMIN — CARVEDILOL 12.5 MG: 12.5 TABLET, FILM COATED ORAL at 09:25

## 2018-01-24 RX ADMIN — TRAMADOL HYDROCHLORIDE 50 MG: 50 TABLET, FILM COATED ORAL at 21:34

## 2018-01-24 RX ADMIN — PREGABALIN 50 MG: 50 CAPSULE ORAL at 09:24

## 2018-01-24 RX ADMIN — OXYCODONE HYDROCHLORIDE AND ACETAMINOPHEN 1 TABLET: 7.5; 325 TABLET ORAL at 10:25

## 2018-01-24 RX ADMIN — TRAMADOL HYDROCHLORIDE 50 MG: 50 TABLET, FILM COATED ORAL at 09:25

## 2018-01-24 RX ADMIN — FOLIC ACID 1 MG: 1 TABLET ORAL at 09:24

## 2018-01-24 RX ADMIN — SPIRONOLACTONE 25 MG: 25 TABLET, FILM COATED ORAL at 09:25

## 2018-01-24 RX ADMIN — DOCUSATE SODIUM 100 MG: 100 CAPSULE, LIQUID FILLED ORAL at 10:28

## 2018-01-24 RX ADMIN — CLONIDINE HYDROCHLORIDE 0.2 MG: 0.2 TABLET ORAL at 17:49

## 2018-01-24 RX ADMIN — POLYETHYLENE GLYCOL 3350 17 G: 17 POWDER, FOR SOLUTION ORAL at 09:28

## 2018-01-24 RX ADMIN — VALACYCLOVIR 500 MG: 500 TABLET, FILM COATED ORAL at 09:25

## 2018-01-24 RX ADMIN — LACTULOSE 20 G: 20 SOLUTION ORAL at 10:26

## 2018-01-24 RX ADMIN — MAGESIUM CITRATE 296 ML: 1.75 LIQUID ORAL at 09:19

## 2018-01-24 RX ADMIN — OXYCODONE HYDROCHLORIDE AND ACETAMINOPHEN 1 TABLET: 7.5; 325 TABLET ORAL at 17:48

## 2018-01-24 RX ADMIN — OXYCODONE HYDROCHLORIDE AND ACETAMINOPHEN 1 TABLET: 7.5; 325 TABLET ORAL at 05:39

## 2018-01-24 RX ADMIN — TICAGRELOR 90 MG: 90 TABLET ORAL at 21:33

## 2018-01-24 RX ADMIN — TICAGRELOR 90 MG: 90 TABLET ORAL at 09:24

## 2018-01-24 RX ADMIN — LINACLOTIDE 290 MCG: 145 CAPSULE, GELATIN COATED ORAL at 05:39

## 2018-01-24 RX ADMIN — CETIRIZINE HYDROCHLORIDE 10 MG: 10 TABLET, FILM COATED ORAL at 09:24

## 2018-01-24 RX ADMIN — OXYCODONE HYDROCHLORIDE AND ACETAMINOPHEN 1 TABLET: 7.5; 325 TABLET ORAL at 13:57

## 2018-01-24 RX ADMIN — PANTOPRAZOLE SODIUM 40 MG: 40 TABLET, DELAYED RELEASE ORAL at 09:25

## 2018-01-24 ASSESSMENT — ENCOUNTER SYMPTOMS
DIARRHEA: 0
SPUTUM PRODUCTION: 0
COUGH: 0
BACK PAIN: 1
SHORTNESS OF BREATH: 0
ABDOMINAL PAIN: 0
HEMOPTYSIS: 0
BLURRED VISION: 0
DOUBLE VISION: 0
CONSTIPATION: 1
NAUSEA: 0
SORE THROAT: 0
WHEEZING: 0
HEARTBURN: 0
STRIDOR: 0
BLOOD IN STOOL: 0

## 2018-01-24 ASSESSMENT — PAIN SCALES - GENERAL
PAINLEVEL_OUTOF10: 1
PAINLEVEL_OUTOF10: 8
PAINLEVEL_OUTOF10: 4
PAINLEVEL_OUTOF10: 4
PAINLEVEL_OUTOF10: 8
PAINLEVEL_OUTOF10: 4
PAINLEVEL_OUTOF10: 3
PAINLEVEL_OUTOF10: 4
PAINLEVEL_OUTOF10: 5

## 2018-01-24 NOTE — CONSULTS
Judy Tan M.D.  Josué Liu M.D. MELISSA Elmore      Internal Medicine Consultation      Name: Carla Castro  MRN: [id number]     Acct: [de-identified]  Room: [address] [place] [address]    Admit Date: 1/22/2018  PCP: Aly Reyna MD    Physician Requesting Consult: Dr. Lisa Villarreal    Reason for Consult:  Medical management    Chief Complaint:     Back pain and constipation    History Obtained From:     patient, electronic medical record    History of Present Illness:      Carla Castro is a  57 y.o.  female who presents with back pain s/o 2 part lumbar laminectomy with decompression and spinal fusion. The patient had had ongoing and progressing low back pain radiating to the right lower extremity when she presented to Dr. Tomas Mills for definitive surgical intervention which was completed on 1/17/18 and 1/19/18. Patient suffered no immediate intra-op complications. Since surgery, the patient reports increased left lower extremity weakness and incoordination. She was transferred to [facility] for further evaluation and treatment. Patient still has some left lower extremity weakness as well as complaints of constipation with no bowel movement x 8 days. Patient is otherwise without complaint and is progressing with therapy. Past Medical History:     Past Medical History:   Diagnosis Date    CAD (coronary artery disease)     S/p Stent to right coronary artery 9/2010. S/p myocardial infarction May 2013.     Cellulitis     LT LEG IN PAST    History of blood transfusion     History of DVT (deep vein thrombosis)     LT    History of neutropenia     History of pulmonary fibrosis     Hx of blood clots 1992    LT LEG    Hypercholesterolemia     Hypertension     Intrinsic asthma     Pacemaker 11/17/2016    Rheumatoid arteritis     Sinus node dysfunction (HCC)     Staph aureus infection     Status post placement of implantable loop recorder 2/13/15, 3/30/15    2/19/15  removed,

## 2018-01-24 NOTE — PROGRESS NOTES
PROPULSION  Level of Assistance: Stand by assistance  Distance: 300FT     AMBULATION  Device: Rolling Walker  Assistance: Contact guard assistance  Distance: 300FT  Quality of Gait: PATIENT MUST LOCK LLE TO PROGRESS RLE. SLIGHT LATERAL DEVIATION OF LEFT HIP WHEN PROGRESSING RLE. PATIENT REQUIRED VCS TO STAND TALL. RECORD REVIEW: Previous medical records, medications were reviewed at today's visit    IMPRESSION:   1. Lumbar spondylolisthesis and stenosis status post 2-stage laminectomy and fusion with left leg adduction weaknessPT/OT/pain control  2. Constipation likely drug-induced. Bowel regimen. Added Linzess and daily suppository for now. Add meds today  3. DVT prophylaxisSCDs  4. Essential hypertensionmedication. Stable. Monitor  5.  Coronary artery diseasecontinue Brilinta   Staffing this date , mutlidisciplinary with entire team with complex decision making and planning for discharge  DANIELA: murali

## 2018-01-25 LAB
ANION GAP SERPL CALCULATED.3IONS-SCNC: 10 MMOL/L (ref 7–19)
BASOPHILS ABSOLUTE: 0 K/UL (ref 0–0.2)
BASOPHILS RELATIVE PERCENT: 0.9 % (ref 0–1)
BUN BLDV-MCNC: 12 MG/DL (ref 8–23)
CALCIUM SERPL-MCNC: 7.8 MG/DL (ref 8.8–10.2)
CHLORIDE BLD-SCNC: 102 MMOL/L (ref 98–111)
CO2: 28 MMOL/L (ref 22–29)
CREAT SERPL-MCNC: 0.5 MG/DL (ref 0.5–0.9)
EOSINOPHILS ABSOLUTE: 0.2 K/UL (ref 0–0.6)
EOSINOPHILS RELATIVE PERCENT: 3.5 % (ref 0–5)
GFR NON-AFRICAN AMERICAN: >60
GLUCOSE BLD-MCNC: 145 MG/DL (ref 74–109)
HCT VFR BLD CALC: 31.5 % (ref 37–47)
HEMOGLOBIN: 9.7 G/DL (ref 12–16)
LYMPHOCYTES ABSOLUTE: 1.9 K/UL (ref 1.1–4.5)
LYMPHOCYTES RELATIVE PERCENT: 41.4 % (ref 20–40)
MCH RBC QN AUTO: 29.8 PG (ref 27–31)
MCHC RBC AUTO-ENTMCNC: 30.8 G/DL (ref 33–37)
MCV RBC AUTO: 96.6 FL (ref 81–99)
MONOCYTES ABSOLUTE: 0.7 K/UL (ref 0–0.9)
MONOCYTES RELATIVE PERCENT: 15.2 % (ref 0–10)
NEUTROPHILS ABSOLUTE: 1.8 K/UL (ref 1.5–7.5)
NEUTROPHILS RELATIVE PERCENT: 38.3 % (ref 50–65)
PDW BLD-RTO: 16 % (ref 11.5–14.5)
PLATELET # BLD: 180 K/UL (ref 130–400)
PMV BLD AUTO: 11.2 FL (ref 9.4–12.3)
POTASSIUM REFLEX MAGNESIUM: 3.7 MMOL/L (ref 3.5–5)
RBC # BLD: 3.26 M/UL (ref 4.2–5.4)
SODIUM BLD-SCNC: 140 MMOL/L (ref 136–145)
WBC # BLD: 4.6 K/UL (ref 4.8–10.8)

## 2018-01-25 PROCEDURE — 97116 GAIT TRAINING THERAPY: CPT

## 2018-01-25 PROCEDURE — 6370000000 HC RX 637 (ALT 250 FOR IP): Performed by: PSYCHIATRY & NEUROLOGY

## 2018-01-25 PROCEDURE — 36415 COLL VENOUS BLD VENIPUNCTURE: CPT

## 2018-01-25 PROCEDURE — 99232 SBSQ HOSP IP/OBS MODERATE 35: CPT | Performed by: PSYCHIATRY & NEUROLOGY

## 2018-01-25 PROCEDURE — 85025 COMPLETE CBC W/AUTO DIFF WBC: CPT

## 2018-01-25 PROCEDURE — 1180000000 HC REHAB R&B

## 2018-01-25 PROCEDURE — 97535 SELF CARE MNGMENT TRAINING: CPT

## 2018-01-25 PROCEDURE — 6370000000 HC RX 637 (ALT 250 FOR IP): Performed by: NURSE PRACTITIONER

## 2018-01-25 PROCEDURE — 97530 THERAPEUTIC ACTIVITIES: CPT

## 2018-01-25 PROCEDURE — 80048 BASIC METABOLIC PNL TOTAL CA: CPT

## 2018-01-25 PROCEDURE — 97110 THERAPEUTIC EXERCISES: CPT

## 2018-01-25 RX ADMIN — CARVEDILOL 12.5 MG: 12.5 TABLET, FILM COATED ORAL at 20:42

## 2018-01-25 RX ADMIN — TRAMADOL HYDROCHLORIDE 50 MG: 50 TABLET, FILM COATED ORAL at 09:25

## 2018-01-25 RX ADMIN — POLYETHYLENE GLYCOL 3350 17 G: 17 POWDER, FOR SOLUTION ORAL at 09:25

## 2018-01-25 RX ADMIN — FUROSEMIDE 40 MG: 20 TABLET ORAL at 09:26

## 2018-01-25 RX ADMIN — VALACYCLOVIR 500 MG: 500 TABLET, FILM COATED ORAL at 09:15

## 2018-01-25 RX ADMIN — DOCUSATE SODIUM 200 MG: 100 CAPSULE, LIQUID FILLED ORAL at 09:15

## 2018-01-25 RX ADMIN — TRAMADOL HYDROCHLORIDE 50 MG: 50 TABLET, FILM COATED ORAL at 20:43

## 2018-01-25 RX ADMIN — OXYCODONE HYDROCHLORIDE AND ACETAMINOPHEN 1 TABLET: 7.5; 325 TABLET ORAL at 17:23

## 2018-01-25 RX ADMIN — FLUTICASONE PROPIONATE 2 SPRAY: 50 SPRAY, METERED NASAL at 09:15

## 2018-01-25 RX ADMIN — OXYCODONE HYDROCHLORIDE AND ACETAMINOPHEN 1 TABLET: 7.5; 325 TABLET ORAL at 04:25

## 2018-01-25 RX ADMIN — OXYCODONE HYDROCHLORIDE AND ACETAMINOPHEN 1 TABLET: 7.5; 325 TABLET ORAL at 21:39

## 2018-01-25 RX ADMIN — CARVEDILOL 12.5 MG: 12.5 TABLET, FILM COATED ORAL at 09:25

## 2018-01-25 RX ADMIN — LINACLOTIDE 290 MCG: 145 CAPSULE, GELATIN COATED ORAL at 07:00

## 2018-01-25 RX ADMIN — OXYCODONE HYDROCHLORIDE AND ACETAMINOPHEN 1 TABLET: 7.5; 325 TABLET ORAL at 14:33

## 2018-01-25 RX ADMIN — CLONIDINE HYDROCHLORIDE 0.2 MG: 0.2 TABLET ORAL at 17:24

## 2018-01-25 RX ADMIN — TICAGRELOR 90 MG: 90 TABLET ORAL at 09:26

## 2018-01-25 RX ADMIN — LACTULOSE 20 G: 20 SOLUTION ORAL at 09:14

## 2018-01-25 RX ADMIN — OXYCODONE HYDROCHLORIDE AND ACETAMINOPHEN 1 TABLET: 7.5; 325 TABLET ORAL at 10:11

## 2018-01-25 RX ADMIN — TICAGRELOR 90 MG: 90 TABLET ORAL at 20:42

## 2018-01-25 RX ADMIN — FOLIC ACID 1 MG: 1 TABLET ORAL at 09:14

## 2018-01-25 RX ADMIN — PREGABALIN 50 MG: 50 CAPSULE ORAL at 09:26

## 2018-01-25 RX ADMIN — SPIRONOLACTONE 25 MG: 25 TABLET, FILM COATED ORAL at 09:15

## 2018-01-25 RX ADMIN — CETIRIZINE HYDROCHLORIDE 10 MG: 10 TABLET, FILM COATED ORAL at 09:14

## 2018-01-25 RX ADMIN — PANTOPRAZOLE SODIUM 40 MG: 40 TABLET, DELAYED RELEASE ORAL at 09:15

## 2018-01-25 ASSESSMENT — PAIN SCALES - GENERAL
PAINLEVEL_OUTOF10: 2
PAINLEVEL_OUTOF10: 2
PAINLEVEL_OUTOF10: 3
PAINLEVEL_OUTOF10: 5
PAINLEVEL_OUTOF10: 8
PAINLEVEL_OUTOF10: 2
PAINLEVEL_OUTOF10: 5
PAINLEVEL_OUTOF10: 3
PAINLEVEL_OUTOF10: 2

## 2018-01-25 NOTE — PROGRESS NOTES
assistance     WHEELCHAIR PROPULSION  Level of Assistance: Stand by assistance  Distance: 300FT     AMBULATION  Device: Rolling Walker  Assistance: Contact guard assistance  Distance: 300FT  Quality of Gait: PATIENT MUST LOCK LLE TO PROGRESS RLE. SLIGHT LATERAL DEVIATION OF LEFT HIP WHEN PROGRESSING RLE. PATIENT REQUIRED VCS TO STAND TALL. RECORD REVIEW: Previous medical records, medications were reviewed at today's visit    IMPRESSION:   1. Lumbar spondylolisthesis and stenosis status post 2-stage laminectomy and fusion with left leg adduction weaknessPT/OT/pain control  2. Constipation likely drug-induced. Bowel regimen. Added Linzess and lactulose. 3. DVT prophylaxisSCDs  4. Essential hypertensionmedication. Stable. Monitor  5.  Coronary artery diseasecontinue Brjeanetteta  Dr. Rodolfo Escobar following  ELOS: restameño

## 2018-01-26 PROCEDURE — 97110 THERAPEUTIC EXERCISES: CPT

## 2018-01-26 PROCEDURE — 1180000000 HC REHAB R&B

## 2018-01-26 PROCEDURE — 6370000000 HC RX 637 (ALT 250 FOR IP): Performed by: NURSE PRACTITIONER

## 2018-01-26 PROCEDURE — 6370000000 HC RX 637 (ALT 250 FOR IP): Performed by: PSYCHIATRY & NEUROLOGY

## 2018-01-26 PROCEDURE — 97530 THERAPEUTIC ACTIVITIES: CPT

## 2018-01-26 PROCEDURE — 97116 GAIT TRAINING THERAPY: CPT

## 2018-01-26 RX ADMIN — CARVEDILOL 12.5 MG: 12.5 TABLET, FILM COATED ORAL at 21:13

## 2018-01-26 RX ADMIN — CARVEDILOL 12.5 MG: 12.5 TABLET, FILM COATED ORAL at 08:28

## 2018-01-26 RX ADMIN — OXYCODONE HYDROCHLORIDE AND ACETAMINOPHEN 1 TABLET: 7.5; 325 TABLET ORAL at 13:35

## 2018-01-26 RX ADMIN — FOLIC ACID 1 MG: 1 TABLET ORAL at 08:25

## 2018-01-26 RX ADMIN — TICAGRELOR 90 MG: 90 TABLET ORAL at 08:24

## 2018-01-26 RX ADMIN — TRAMADOL HYDROCHLORIDE 50 MG: 50 TABLET, FILM COATED ORAL at 21:13

## 2018-01-26 RX ADMIN — SPIRONOLACTONE 25 MG: 25 TABLET, FILM COATED ORAL at 08:25

## 2018-01-26 RX ADMIN — OXYCODONE HYDROCHLORIDE AND ACETAMINOPHEN 1 TABLET: 7.5; 325 TABLET ORAL at 17:26

## 2018-01-26 RX ADMIN — FLUTICASONE PROPIONATE 2 SPRAY: 50 SPRAY, METERED NASAL at 08:24

## 2018-01-26 RX ADMIN — TICAGRELOR 90 MG: 90 TABLET ORAL at 21:13

## 2018-01-26 RX ADMIN — OXYCODONE HYDROCHLORIDE AND ACETAMINOPHEN 1 TABLET: 7.5; 325 TABLET ORAL at 05:29

## 2018-01-26 RX ADMIN — PANTOPRAZOLE SODIUM 40 MG: 40 TABLET, DELAYED RELEASE ORAL at 08:25

## 2018-01-26 RX ADMIN — CETIRIZINE HYDROCHLORIDE 10 MG: 10 TABLET, FILM COATED ORAL at 08:24

## 2018-01-26 RX ADMIN — OXYCODONE HYDROCHLORIDE AND ACETAMINOPHEN 1 TABLET: 7.5; 325 TABLET ORAL at 09:46

## 2018-01-26 RX ADMIN — PREGABALIN 50 MG: 50 CAPSULE ORAL at 08:25

## 2018-01-26 RX ADMIN — VALACYCLOVIR 500 MG: 500 TABLET, FILM COATED ORAL at 08:24

## 2018-01-26 RX ADMIN — DOCUSATE SODIUM 100 MG: 100 CAPSULE, LIQUID FILLED ORAL at 13:34

## 2018-01-26 RX ADMIN — FUROSEMIDE 40 MG: 20 TABLET ORAL at 08:24

## 2018-01-26 RX ADMIN — TRAMADOL HYDROCHLORIDE 50 MG: 50 TABLET, FILM COATED ORAL at 08:25

## 2018-01-26 RX ADMIN — OXYCODONE HYDROCHLORIDE AND ACETAMINOPHEN 1 TABLET: 7.5; 325 TABLET ORAL at 21:55

## 2018-01-26 RX ADMIN — LEVOTHYROXINE SODIUM 75 MCG: 50 TABLET ORAL at 05:29

## 2018-01-26 RX ADMIN — CLONIDINE HYDROCHLORIDE 0.2 MG: 0.2 TABLET ORAL at 17:26

## 2018-01-26 ASSESSMENT — PAIN SCALES - GENERAL
PAINLEVEL_OUTOF10: 4
PAINLEVEL_OUTOF10: 3
PAINLEVEL_OUTOF10: 5
PAINLEVEL_OUTOF10: 3
PAINLEVEL_OUTOF10: 3
PAINLEVEL_OUTOF10: 2
PAINLEVEL_OUTOF10: 4
PAINLEVEL_OUTOF10: 2
PAINLEVEL_OUTOF10: 2
PAINLEVEL_OUTOF10: 3

## 2018-01-26 ASSESSMENT — ENCOUNTER SYMPTOMS
DIARRHEA: 0
SHORTNESS OF BREATH: 0
BLURRED VISION: 0
VOMITING: 0
NAUSEA: 0
DOUBLE VISION: 0
COUGH: 0

## 2018-01-27 PROCEDURE — 97535 SELF CARE MNGMENT TRAINING: CPT

## 2018-01-27 PROCEDURE — 6370000000 HC RX 637 (ALT 250 FOR IP): Performed by: PSYCHIATRY & NEUROLOGY

## 2018-01-27 PROCEDURE — 1180000000 HC REHAB R&B

## 2018-01-27 PROCEDURE — 97116 GAIT TRAINING THERAPY: CPT

## 2018-01-27 PROCEDURE — 97110 THERAPEUTIC EXERCISES: CPT

## 2018-01-27 PROCEDURE — 6370000000 HC RX 637 (ALT 250 FOR IP): Performed by: NURSE PRACTITIONER

## 2018-01-27 PROCEDURE — 97530 THERAPEUTIC ACTIVITIES: CPT

## 2018-01-27 RX ADMIN — VALACYCLOVIR 500 MG: 500 TABLET, FILM COATED ORAL at 08:55

## 2018-01-27 RX ADMIN — PANTOPRAZOLE SODIUM 40 MG: 40 TABLET, DELAYED RELEASE ORAL at 08:54

## 2018-01-27 RX ADMIN — TRAMADOL HYDROCHLORIDE 50 MG: 50 TABLET, FILM COATED ORAL at 21:17

## 2018-01-27 RX ADMIN — SPIRONOLACTONE 25 MG: 25 TABLET, FILM COATED ORAL at 08:55

## 2018-01-27 RX ADMIN — FUROSEMIDE 40 MG: 20 TABLET ORAL at 08:54

## 2018-01-27 RX ADMIN — LEVOTHYROXINE SODIUM 75 MCG: 50 TABLET ORAL at 05:53

## 2018-01-27 RX ADMIN — DOCUSATE SODIUM 200 MG: 100 CAPSULE, LIQUID FILLED ORAL at 08:54

## 2018-01-27 RX ADMIN — PREGABALIN 50 MG: 50 CAPSULE ORAL at 08:55

## 2018-01-27 RX ADMIN — CETIRIZINE HYDROCHLORIDE 10 MG: 10 TABLET, FILM COATED ORAL at 08:54

## 2018-01-27 RX ADMIN — OXYCODONE HYDROCHLORIDE AND ACETAMINOPHEN 1 TABLET: 7.5; 325 TABLET ORAL at 10:03

## 2018-01-27 RX ADMIN — OXYCODONE HYDROCHLORIDE AND ACETAMINOPHEN 1 TABLET: 7.5; 325 TABLET ORAL at 17:46

## 2018-01-27 RX ADMIN — CARVEDILOL 12.5 MG: 12.5 TABLET, FILM COATED ORAL at 08:54

## 2018-01-27 RX ADMIN — OXYCODONE HYDROCHLORIDE AND ACETAMINOPHEN 1 TABLET: 7.5; 325 TABLET ORAL at 05:53

## 2018-01-27 RX ADMIN — OXYCODONE HYDROCHLORIDE AND ACETAMINOPHEN 1 TABLET: 7.5; 325 TABLET ORAL at 13:38

## 2018-01-27 RX ADMIN — FOLIC ACID 1 MG: 1 TABLET ORAL at 08:55

## 2018-01-27 RX ADMIN — OXYCODONE HYDROCHLORIDE AND ACETAMINOPHEN 1 TABLET: 7.5; 325 TABLET ORAL at 21:17

## 2018-01-27 RX ADMIN — CARVEDILOL 12.5 MG: 12.5 TABLET, FILM COATED ORAL at 21:16

## 2018-01-27 RX ADMIN — FLUTICASONE PROPIONATE 2 SPRAY: 50 SPRAY, METERED NASAL at 08:55

## 2018-01-27 RX ADMIN — TRAMADOL HYDROCHLORIDE 50 MG: 50 TABLET, FILM COATED ORAL at 08:54

## 2018-01-27 RX ADMIN — CLONIDINE HYDROCHLORIDE 0.2 MG: 0.2 TABLET ORAL at 17:46

## 2018-01-27 RX ADMIN — TICAGRELOR 90 MG: 90 TABLET ORAL at 21:16

## 2018-01-27 RX ADMIN — TICAGRELOR 90 MG: 90 TABLET ORAL at 08:54

## 2018-01-27 ASSESSMENT — ENCOUNTER SYMPTOMS
DIARRHEA: 0
BLURRED VISION: 0
VOMITING: 0
DOUBLE VISION: 0
COUGH: 0
SHORTNESS OF BREATH: 0
NAUSEA: 0

## 2018-01-27 ASSESSMENT — PAIN SCALES - GENERAL
PAINLEVEL_OUTOF10: 3
PAINLEVEL_OUTOF10: 1
PAINLEVEL_OUTOF10: 2
PAINLEVEL_OUTOF10: 3
PAINLEVEL_OUTOF10: 2
PAINLEVEL_OUTOF10: 0
PAINLEVEL_OUTOF10: 2
PAINLEVEL_OUTOF10: 4
PAINLEVEL_OUTOF10: 4
PAINLEVEL_OUTOF10: 2
PAINLEVEL_OUTOF10: 0
PAINLEVEL_OUTOF10: 4

## 2018-01-27 ASSESSMENT — PAIN DESCRIPTION - LOCATION: LOCATION: LEG;BACK

## 2018-01-27 ASSESSMENT — PAIN DESCRIPTION - ORIENTATION: ORIENTATION: LEFT;UPPER;LOWER

## 2018-01-27 ASSESSMENT — PAIN DESCRIPTION - PAIN TYPE: TYPE: CHRONIC PAIN;SURGICAL PAIN

## 2018-01-27 NOTE — PROGRESS NOTES
Saul Bolton M.D.  Carloyn Frankel, M.D. MELISSA Vega      Internal Medicine Progress Note    1/27/2018   7:42 AM    Name:  Leon Marshall  MRN:    [id number]     Acct:     [de-identified]   Room:  [address] [place] [address] Day: 5     Admit Date: 1/22/2018  3:05 PM  PCP: Scooby Whitney MD    Subjective:     C/C: weakness    Interval History: Status: improved. Resting in bed. Assisted to bathroom and back. Progressing with therapy. Pain adequately controlled. Still with some weakness/paresthesias to left lower extremity. Ambulating well, but had trouble navigating stairs with therapy yesterday. Review of Systems   Constitutional: Negative for chills and fever. HENT: Negative for congestion. Eyes: Negative for blurred vision and double vision. Respiratory: Negative for cough and shortness of breath. Cardiovascular: Negative for chest pain and leg swelling. Gastrointestinal: Negative for diarrhea, nausea and vomiting. Genitourinary: Negative for dysuria and urgency. Musculoskeletal: Negative for joint pain and myalgias. Skin: Negative for itching and rash. Neurological: Positive for tingling and weakness. Negative for dizziness, focal weakness and headaches. Psychiatric/Behavioral: Negative for depression. The patient is not nervous/anxious. Medications: Allergies:    Allergies   Allergen Reactions    Amoxicillin     Lipitor     Niaspan [Niacin Er]     Penicillins     Relafen [Nabumetone]     Zocor [Simvastatin]        Current Meds:     lactulose (CHRONULAC) 10 GM/15ML solution 20 g TID   docusate sodium (COLACE) capsule 200 mg BID   polyethylene glycol (GLYCOLAX) packet 17 g BID   methylnaltrexone (RELISTOR) injection 12 mg Every Other Day   cloNIDine (CATAPRES) tablet 0.2 mg QPM   oxyCODONE-acetaminophen (PERCOCET) 7.5-325 MG per tablet 1 tablet Q4H While awake   traMADol (ULTRAM) tablet 50 mg BID   linaclotide (LINZESS) capsule 984 mcg QAM AC   folic Constitutional: She is oriented to person, place, and time. She appears well-developed and well-nourished. No distress. HENT:   Head: Normocephalic and atraumatic. Mouth/Throat: Oropharynx is clear and moist.   Eyes: EOM are normal. Pupils are equal, round, and reactive to light. No scleral icterus. Neck: Normal range of motion. Neck supple. No tracheal deviation present. No thyromegaly present. Cardiovascular: Normal rate, regular rhythm and intact distal pulses. Pulmonary/Chest: Effort normal and breath sounds normal. No respiratory distress. She exhibits no tenderness. Abdominal: Soft. Bowel sounds are normal. She exhibits no distension. There is no tenderness. Musculoskeletal: Normal range of motion. She exhibits tenderness (with rom in low back). She exhibits no edema. Neurological: She is alert and oriented to person, place, and time. She displays normal reflexes. No cranial nerve deficit. Skin: Skin is warm and dry. Bruising to lumbar/sacral regions   Psychiatric: She has a normal mood and affect. Her behavior is normal.   Nursing note and vitals reviewed. Assessment:        Primary Problem  Spondylolisthesis of lumbar region     Active Hospital Problems    Diagnosis Date Noted    Spondylolisthesis of lumbar region [M43.16] 01/23/2018    Rheumatoid arteritis [I00]     Hypertension [I10]      Past Medical History:   Diagnosis Date    CAD (coronary artery disease)     S/p Stent to right coronary artery 9/2010. S/p myocardial infarction May 2013.     Cellulitis     LT LEG IN PAST    History of blood transfusion     History of DVT (deep vein thrombosis)     LT    History of neutropenia     History of pulmonary fibrosis     Hx of blood clots 1992    LT LEG    Hypercholesterolemia     Hypertension     Intrinsic asthma     Pacemaker 11/17/2016    Rheumatoid arteritis     Sinus node dysfunction (HCC)     Staph aureus infection     Status post placement of implantable loop

## 2018-01-28 PROCEDURE — 6370000000 HC RX 637 (ALT 250 FOR IP): Performed by: PSYCHIATRY & NEUROLOGY

## 2018-01-28 PROCEDURE — 6370000000 HC RX 637 (ALT 250 FOR IP): Performed by: NURSE PRACTITIONER

## 2018-01-28 PROCEDURE — 6370000000 HC RX 637 (ALT 250 FOR IP): Performed by: PHYSICIAN ASSISTANT

## 2018-01-28 PROCEDURE — 1180000000 HC REHAB R&B

## 2018-01-28 RX ORDER — PREDNISONE 1 MG/1
5 TABLET ORAL DAILY
Status: DISCONTINUED | OUTPATIENT
Start: 2018-02-02 | End: 2018-02-02 | Stop reason: HOSPADM

## 2018-01-28 RX ORDER — PREDNISONE 10 MG/1
10 TABLET ORAL DAILY
Status: COMPLETED | OUTPATIENT
Start: 2018-02-01 | End: 2018-02-01

## 2018-01-28 RX ORDER — PREDNISONE 50 MG/1
50 TABLET ORAL DAILY
Status: COMPLETED | OUTPATIENT
Start: 2018-01-28 | End: 2018-01-28

## 2018-01-28 RX ORDER — PREDNISONE 10 MG/1
40 TABLET ORAL DAILY
Status: COMPLETED | OUTPATIENT
Start: 2018-01-29 | End: 2018-01-29

## 2018-01-28 RX ORDER — PREDNISONE 10 MG/1
30 TABLET ORAL DAILY
Status: COMPLETED | OUTPATIENT
Start: 2018-01-30 | End: 2018-01-30

## 2018-01-28 RX ORDER — PREDNISONE 10 MG/1
20 TABLET ORAL DAILY
Status: COMPLETED | OUTPATIENT
Start: 2018-01-31 | End: 2018-01-31

## 2018-01-28 RX ADMIN — OXYCODONE HYDROCHLORIDE AND ACETAMINOPHEN 1 TABLET: 7.5; 325 TABLET ORAL at 17:43

## 2018-01-28 RX ADMIN — DOCUSATE SODIUM 200 MG: 100 CAPSULE, LIQUID FILLED ORAL at 21:28

## 2018-01-28 RX ADMIN — TICAGRELOR 90 MG: 90 TABLET ORAL at 08:45

## 2018-01-28 RX ADMIN — CARVEDILOL 12.5 MG: 12.5 TABLET, FILM COATED ORAL at 21:27

## 2018-01-28 RX ADMIN — SPIRONOLACTONE 25 MG: 25 TABLET, FILM COATED ORAL at 08:46

## 2018-01-28 RX ADMIN — OXYCODONE HYDROCHLORIDE AND ACETAMINOPHEN 1 TABLET: 7.5; 325 TABLET ORAL at 10:00

## 2018-01-28 RX ADMIN — VALACYCLOVIR 500 MG: 500 TABLET, FILM COATED ORAL at 08:46

## 2018-01-28 RX ADMIN — CARVEDILOL 12.5 MG: 12.5 TABLET, FILM COATED ORAL at 08:46

## 2018-01-28 RX ADMIN — LINACLOTIDE 290 MCG: 145 CAPSULE, GELATIN COATED ORAL at 06:08

## 2018-01-28 RX ADMIN — OXYCODONE HYDROCHLORIDE AND ACETAMINOPHEN 1 TABLET: 7.5; 325 TABLET ORAL at 13:41

## 2018-01-28 RX ADMIN — PANTOPRAZOLE SODIUM 40 MG: 40 TABLET, DELAYED RELEASE ORAL at 08:46

## 2018-01-28 RX ADMIN — DOCUSATE SODIUM 200 MG: 100 CAPSULE, LIQUID FILLED ORAL at 08:45

## 2018-01-28 RX ADMIN — OXYCODONE HYDROCHLORIDE AND ACETAMINOPHEN 1 TABLET: 7.5; 325 TABLET ORAL at 21:27

## 2018-01-28 RX ADMIN — PREGABALIN 50 MG: 50 CAPSULE ORAL at 08:45

## 2018-01-28 RX ADMIN — LEVOTHYROXINE SODIUM 75 MCG: 50 TABLET ORAL at 06:07

## 2018-01-28 RX ADMIN — PREDNISONE 50 MG: 50 TABLET ORAL at 15:34

## 2018-01-28 RX ADMIN — TRAMADOL HYDROCHLORIDE 50 MG: 50 TABLET, FILM COATED ORAL at 21:28

## 2018-01-28 RX ADMIN — FUROSEMIDE 40 MG: 20 TABLET ORAL at 08:46

## 2018-01-28 RX ADMIN — CETIRIZINE HYDROCHLORIDE 10 MG: 10 TABLET, FILM COATED ORAL at 08:46

## 2018-01-28 RX ADMIN — TRAMADOL HYDROCHLORIDE 50 MG: 50 TABLET, FILM COATED ORAL at 08:47

## 2018-01-28 RX ADMIN — OXYCODONE HYDROCHLORIDE AND ACETAMINOPHEN 1 TABLET: 7.5; 325 TABLET ORAL at 04:49

## 2018-01-28 RX ADMIN — FLUTICASONE PROPIONATE 2 SPRAY: 50 SPRAY, METERED NASAL at 08:47

## 2018-01-28 RX ADMIN — CLONIDINE HYDROCHLORIDE 0.2 MG: 0.2 TABLET ORAL at 17:43

## 2018-01-28 RX ADMIN — FOLIC ACID 1 MG: 1 TABLET ORAL at 08:47

## 2018-01-28 ASSESSMENT — PAIN SCALES - GENERAL
PAINLEVEL_OUTOF10: 2
PAINLEVEL_OUTOF10: 4
PAINLEVEL_OUTOF10: 4
PAINLEVEL_OUTOF10: 8
PAINLEVEL_OUTOF10: 4
PAINLEVEL_OUTOF10: 1
PAINLEVEL_OUTOF10: 0
PAINLEVEL_OUTOF10: 1
PAINLEVEL_OUTOF10: 1
PAINLEVEL_OUTOF10: 5
PAINLEVEL_OUTOF10: 1
PAINLEVEL_OUTOF10: 2
PAINLEVEL_OUTOF10: 2

## 2018-01-29 LAB
BASOPHILS ABSOLUTE: 0 K/UL (ref 0–0.2)
BASOPHILS RELATIVE PERCENT: 0.2 % (ref 0–1)
EOSINOPHILS ABSOLUTE: 0 K/UL (ref 0–0.6)
EOSINOPHILS RELATIVE PERCENT: 0 % (ref 0–5)
HCT VFR BLD CALC: 35.6 % (ref 37–47)
HEMOGLOBIN: 11.2 G/DL (ref 12–16)
LYMPHOCYTES ABSOLUTE: 1.4 K/UL (ref 1.1–4.5)
LYMPHOCYTES RELATIVE PERCENT: 22.8 % (ref 20–40)
MCH RBC QN AUTO: 29.8 PG (ref 27–31)
MCHC RBC AUTO-ENTMCNC: 31.5 G/DL (ref 33–37)
MCV RBC AUTO: 94.7 FL (ref 81–99)
MONOCYTES ABSOLUTE: 0.2 K/UL (ref 0–0.9)
MONOCYTES RELATIVE PERCENT: 3.4 % (ref 0–10)
NEUTROPHILS ABSOLUTE: 4.3 K/UL (ref 1.5–7.5)
NEUTROPHILS RELATIVE PERCENT: 72.6 % (ref 50–65)
PDW BLD-RTO: 15.8 % (ref 11.5–14.5)
PLATELET # BLD: 215 K/UL (ref 130–400)
PMV BLD AUTO: 10.9 FL (ref 9.4–12.3)
RBC # BLD: 3.76 M/UL (ref 4.2–5.4)
WBC # BLD: 5.9 K/UL (ref 4.8–10.8)

## 2018-01-29 PROCEDURE — 97110 THERAPEUTIC EXERCISES: CPT

## 2018-01-29 PROCEDURE — 6370000000 HC RX 637 (ALT 250 FOR IP): Performed by: NURSE PRACTITIONER

## 2018-01-29 PROCEDURE — 1180000000 HC REHAB R&B

## 2018-01-29 PROCEDURE — 97116 GAIT TRAINING THERAPY: CPT

## 2018-01-29 PROCEDURE — 97535 SELF CARE MNGMENT TRAINING: CPT

## 2018-01-29 PROCEDURE — 97530 THERAPEUTIC ACTIVITIES: CPT

## 2018-01-29 PROCEDURE — 6370000000 HC RX 637 (ALT 250 FOR IP): Performed by: PHYSICIAN ASSISTANT

## 2018-01-29 PROCEDURE — 85025 COMPLETE CBC W/AUTO DIFF WBC: CPT

## 2018-01-29 PROCEDURE — 99232 SBSQ HOSP IP/OBS MODERATE 35: CPT | Performed by: PSYCHIATRY & NEUROLOGY

## 2018-01-29 PROCEDURE — 6370000000 HC RX 637 (ALT 250 FOR IP): Performed by: PSYCHIATRY & NEUROLOGY

## 2018-01-29 PROCEDURE — 36415 COLL VENOUS BLD VENIPUNCTURE: CPT

## 2018-01-29 RX ADMIN — DOCUSATE SODIUM 200 MG: 100 CAPSULE, LIQUID FILLED ORAL at 08:29

## 2018-01-29 RX ADMIN — FLUTICASONE PROPIONATE 2 SPRAY: 50 SPRAY, METERED NASAL at 08:28

## 2018-01-29 RX ADMIN — LEVOTHYROXINE SODIUM 75 MCG: 50 TABLET ORAL at 06:10

## 2018-01-29 RX ADMIN — CARVEDILOL 12.5 MG: 12.5 TABLET, FILM COATED ORAL at 21:30

## 2018-01-29 RX ADMIN — FUROSEMIDE 40 MG: 20 TABLET ORAL at 08:31

## 2018-01-29 RX ADMIN — OXYCODONE HYDROCHLORIDE AND ACETAMINOPHEN 1 TABLET: 7.5; 325 TABLET ORAL at 17:04

## 2018-01-29 RX ADMIN — ERGOCALCIFEROL 50000 UNITS: 1.25 CAPSULE ORAL at 08:31

## 2018-01-29 RX ADMIN — CARVEDILOL 12.5 MG: 12.5 TABLET, FILM COATED ORAL at 08:30

## 2018-01-29 RX ADMIN — PREDNISONE 40 MG: 10 TABLET ORAL at 08:31

## 2018-01-29 RX ADMIN — SPIRONOLACTONE 25 MG: 25 TABLET, FILM COATED ORAL at 08:30

## 2018-01-29 RX ADMIN — OXYCODONE HYDROCHLORIDE AND ACETAMINOPHEN 1 TABLET: 7.5; 325 TABLET ORAL at 10:36

## 2018-01-29 RX ADMIN — OXYCODONE HYDROCHLORIDE AND ACETAMINOPHEN 1 TABLET: 7.5; 325 TABLET ORAL at 14:30

## 2018-01-29 RX ADMIN — DOCUSATE SODIUM 200 MG: 100 CAPSULE, LIQUID FILLED ORAL at 21:30

## 2018-01-29 RX ADMIN — VALACYCLOVIR 500 MG: 500 TABLET, FILM COATED ORAL at 08:30

## 2018-01-29 RX ADMIN — TICAGRELOR 90 MG: 90 TABLET ORAL at 21:30

## 2018-01-29 RX ADMIN — TICAGRELOR 90 MG: 90 TABLET ORAL at 08:30

## 2018-01-29 RX ADMIN — TRAMADOL HYDROCHLORIDE 50 MG: 50 TABLET, FILM COATED ORAL at 21:31

## 2018-01-29 RX ADMIN — PREGABALIN 50 MG: 50 CAPSULE ORAL at 08:29

## 2018-01-29 RX ADMIN — PANTOPRAZOLE SODIUM 40 MG: 40 TABLET, DELAYED RELEASE ORAL at 08:31

## 2018-01-29 RX ADMIN — TRAMADOL HYDROCHLORIDE 50 MG: 50 TABLET, FILM COATED ORAL at 08:30

## 2018-01-29 RX ADMIN — OXYCODONE HYDROCHLORIDE AND ACETAMINOPHEN 1 TABLET: 7.5; 325 TABLET ORAL at 21:30

## 2018-01-29 RX ADMIN — FOLIC ACID 1 MG: 1 TABLET ORAL at 08:31

## 2018-01-29 RX ADMIN — OXYCODONE HYDROCHLORIDE AND ACETAMINOPHEN 1 TABLET: 7.5; 325 TABLET ORAL at 06:10

## 2018-01-29 RX ADMIN — CETIRIZINE HYDROCHLORIDE 10 MG: 10 TABLET, FILM COATED ORAL at 08:31

## 2018-01-29 RX ADMIN — CLONIDINE HYDROCHLORIDE 0.2 MG: 0.2 TABLET ORAL at 17:04

## 2018-01-29 ASSESSMENT — PAIN SCALES - GENERAL
PAINLEVEL_OUTOF10: 0
PAINLEVEL_OUTOF10: 2
PAINLEVEL_OUTOF10: 1
PAINLEVEL_OUTOF10: 2
PAINLEVEL_OUTOF10: 7
PAINLEVEL_OUTOF10: 1
PAINLEVEL_OUTOF10: 1
PAINLEVEL_OUTOF10: 2
PAINLEVEL_OUTOF10: 3
PAINLEVEL_OUTOF10: 2
PAINLEVEL_OUTOF10: 0
PAINLEVEL_OUTOF10: 8
PAINLEVEL_OUTOF10: 3
PAINLEVEL_OUTOF10: 1

## 2018-01-29 ASSESSMENT — ENCOUNTER SYMPTOMS
COUGH: 0
DOUBLE VISION: 0
NAUSEA: 0
SHORTNESS OF BREATH: 0
BLURRED VISION: 0
VOMITING: 0
DIARRHEA: 0

## 2018-01-29 ASSESSMENT — PAIN DESCRIPTION - LOCATION: LOCATION: BACK

## 2018-01-29 ASSESSMENT — PAIN DESCRIPTION - ORIENTATION: ORIENTATION: LOWER

## 2018-01-29 ASSESSMENT — PAIN DESCRIPTION - PAIN TYPE: TYPE: CHRONIC PAIN

## 2018-01-29 NOTE — PLAN OF CARE
Problem: Falls - Risk of  Goal: Absence of falls  Outcome: Ongoing      Problem: Pain:  Goal: Pain level will decrease  Pain level will decrease   Outcome: Ongoing      Problem: Wound:  Goal: Demonstration of wound healing without infection will improve  Demonstration of wound healing without infection will improve     Outcome: Ongoing

## 2018-01-29 NOTE — PATIENT CARE CONFERENCE
[facility] ACUTE INPATIENT REHABILITATION  TEAM CONFERENCE NOTE    Date: 2018  Patient Name: Leon Marshall        MRN: [id number]    : 1955  (57 y.o.)  Gender: female      Diagnosis: POST-OP 2 STAGE LAMINECTOMY AND SPINAL FUSION       PHYSICAL THERAPY  STRENGTH  Strength RLE  Strength RLE: WFL  Strength LLE  Comment: DF/PF 5/5, HIP ADD/ABD 3+/5, KNEE EXTENSION 3/5  ROM  AROM RLE (degrees)  RLE AROM: WFL  AROM LLE (degrees)  LLE General AROM: UNABLE TO FULLY EXTEND KNEE, DIFFICULTY WITH HIP ABD/ADD   BED MOBILITY  Rolling: Independent  Supine to Sit: Independent  Sit to Supine: Independent  TRANSFERS  Sit to Stand: Independent  Bed to Chair: Stand by assistance  Car Transfer: Stand by assistance  WHEELCHAIR PROPULSION  Level of Assistance: Stand by assistance  Distance: 300FT     AMBULATION  Device: Single point cane  Assistance: Stand by assistance, Contact guard assistance  Distance: 500'  Quality of Gait: guarded  STAIRS  # Steps : 11  Rails: Left ascending  Assistance: Contact guard assistance  Other Apparatus:  (LSO)  FIM SCORES  Bed, Chair, Wheel Chair: 6 - Requires assistive device (slide rail)  Walk: 5 - Supervision Requires standby supervision or cuing to walk/operate wheelchair at least 150 feet  Wheel Chair: 5 - Supervision Requires standby supervision or cuing to walk/operate wheelchair at least 150 feet  Stairs: 4- Minimal Contact Assistance Perfoms 75% or more of the effort to go up and down one flight of stairs  GOALS:  Short term goals  Time Frame for Short term goals: 5-7 DAYS  Short term goal 1: TF FIM 5  Short term goal 2: AMB FIM 5  Short term goal 3: STAIR FIM 2  Short term goal 5: SBA HEP    Long term goals  Time Frame for Long term goals : 10-12 DAYS  Long term goal 1: TF FIM 6  Long term goal 2: AMB FIM 6  Long term goal 3: STAIR FIM 5  Long term goal 5: INDEP HEP    ASSESSMENT:  Assessment: Patient doing well with SPC, No LOB noted today.   Instructed on log rolling for supine to sit from right and left.       SPEECH THERAPY: N/A      OCCUPATIONAL THERAPY    OT FIM PROGRESS   ADMIT SCORE CURRENT SCORE GOAL   EATING Eatin - Feeds self with setup/supervision/cues and/or requires only setup/supervision/cues to perform tube feedings Eatin - Patient feeds self GOAL: Eatin   GROOMING Groomin - Requires setup/cues to do all tasks Groomin - Patient independent with all grooming tasks GOAL: Groomin   BATHING Bathing: 3 - Able to bathe 5-7 areas Bathin - Able to bathe all 10 areas with device GOAL: Bathin   UPPER EXTREMITY DRESSING Dressing-Upper: 5 - Requires setup/supervision/cues and/or requires assist with presthesis/brace only (Min A with donning LSO brace ) Dressing-Upper: 7 - Patient independently dresses upper body GOAL: Dressing-Upper: 7   LOWER EXTREMITY DRESSING Dressing-Lower: 2 - Requires assist with 4-5 parts of dressing Dressing-Lower: 6 - Independent with device/prosthesis GOAL: Dressing-Lower: 6   TOILETING Toiletin - Requires steadying assistance only ( ) Toiletin - Requires device (grab bar/walker/etc.) GOAL: Toiletin   TOILET TRANSFER Toilet Transfer: 4 - Requires steadying assistance only < 25% assist (ambulating with RW to standard toilet) Toilet Transfer: 6 - Independent with device (grab bar/walker/slide bar) GOAL: Toilet: 6   TUB/SHOWER TRANSFER Primary Mode: Shower     Shower Transfer: 4 - Minimal contact assistance, pt. expends 75% or more effort (level walk in shower to TTB )   Primary Mode: Shower     Shower Transfer: 6 - Modified independence     Primary Mode: Shower  GOAL: Tub, Shower: 6         Cognition:  Comprehension: 6 - Complex ideas 90% or device (hearing aid/glasses) (Hearing aids)  Expression: 7 - Patient expresses complex ideas/needs  Social Interaction: 7 - Patient has appropriate behavior/relations 100% of the time  Problem Solvin - Patient able to solve simple/routine tasks  Memory: 5 - Patient requires prompting with stress/unfamiliar situations    UE Functioning:  WFLs    Pain Assessment:  Pain Level: 2       STGs:  Short term goals  Time Frame for Short term goals: 1 week   Short term goal 1: MET   Short term goal 2: MET  Short term goal 3: MET  Short term goal 4: MET  Short term goal 5: MET   Short term goal 6: MET    LTGs:  Long term goals  Time Frame for Long term goals : 2 weeks   Long term goal 1: MET  Long term goal 2: MET  Long term goal 3: MET  Long term goal 4: Pt verbalize DME needs. Long term goal 5: Modified independent with ambulatory home making task. Assessment:  Decreased functional mobility; Decreased high-level IADLs    NUTRITION  Current Wt: Weight: 204 lb (92.5 kg) / Body mass index is 32.93 kg/m². Admission Wt: Admission Body Weight: 204 lb (92.5 kg)  Oral Diet Orders: General   Oral Nutrition Supplement (ONS) Orders: None   PO %  Please see nutrition note for details. NURSING    FIMS:  Bladder  Level of Assistance: Bladder Level of Assistance: 6- Modified [place]  Frequency of Accidents: Bladder Frequency of Accidents: 6 - No accidents: uses device    Bowel  Level of Assistance: Bowel Level of Assistance: 6- Modified [place]  Frequency of Accidents: Bowel Frequency of Accidents: 6 - No accidents: uses device    Wounds/Incisions/Ulcers: Incision healing well     Varinder Scale Score: 21    Pain: Patient's pain is currently controlled with Percocet 7.5 1 q 4 hours prn    Consultations/Labs/X-rays:     Family Education:  is support, She is taking care of self    Fall Risk:  Christian Score: 36    Fall in the last week? Other Nursing Issues:         SOCIAL WORK/CASE MANAGEMENT  Assessment: Active participates in program, encouraged about progress, Has good family support    Discharge Plan   Estimated Length of Stay: 2/2/18  Destination: outpatient therapies    Pass: No    Services at Discharge:  Outpatient Physical Therapy 3x week    Equipment at Discharge:

## 2018-01-30 PROCEDURE — 97116 GAIT TRAINING THERAPY: CPT

## 2018-01-30 PROCEDURE — 99232 SBSQ HOSP IP/OBS MODERATE 35: CPT | Performed by: PSYCHIATRY & NEUROLOGY

## 2018-01-30 PROCEDURE — 97530 THERAPEUTIC ACTIVITIES: CPT

## 2018-01-30 PROCEDURE — 6370000000 HC RX 637 (ALT 250 FOR IP): Performed by: PSYCHIATRY & NEUROLOGY

## 2018-01-30 PROCEDURE — 1180000000 HC REHAB R&B

## 2018-01-30 PROCEDURE — 6370000000 HC RX 637 (ALT 250 FOR IP): Performed by: NURSE PRACTITIONER

## 2018-01-30 PROCEDURE — 6370000000 HC RX 637 (ALT 250 FOR IP): Performed by: PHYSICIAN ASSISTANT

## 2018-01-30 PROCEDURE — 97110 THERAPEUTIC EXERCISES: CPT

## 2018-01-30 RX ADMIN — CARVEDILOL 12.5 MG: 12.5 TABLET, FILM COATED ORAL at 21:31

## 2018-01-30 RX ADMIN — OXYCODONE HYDROCHLORIDE AND ACETAMINOPHEN 1 TABLET: 7.5; 325 TABLET ORAL at 09:37

## 2018-01-30 RX ADMIN — CETIRIZINE HYDROCHLORIDE 10 MG: 10 TABLET, FILM COATED ORAL at 09:39

## 2018-01-30 RX ADMIN — OXYCODONE HYDROCHLORIDE AND ACETAMINOPHEN 1 TABLET: 7.5; 325 TABLET ORAL at 21:31

## 2018-01-30 RX ADMIN — DOCUSATE SODIUM 200 MG: 100 CAPSULE, LIQUID FILLED ORAL at 21:32

## 2018-01-30 RX ADMIN — OXYCODONE HYDROCHLORIDE AND ACETAMINOPHEN 1 TABLET: 7.5; 325 TABLET ORAL at 15:57

## 2018-01-30 RX ADMIN — CLONIDINE HYDROCHLORIDE 0.2 MG: 0.2 TABLET ORAL at 18:30

## 2018-01-30 RX ADMIN — FUROSEMIDE 40 MG: 20 TABLET ORAL at 09:38

## 2018-01-30 RX ADMIN — TICAGRELOR 90 MG: 90 TABLET ORAL at 09:38

## 2018-01-30 RX ADMIN — PREGABALIN 50 MG: 50 CAPSULE ORAL at 09:36

## 2018-01-30 RX ADMIN — PREDNISONE 30 MG: 10 TABLET ORAL at 09:38

## 2018-01-30 RX ADMIN — CARVEDILOL 12.5 MG: 12.5 TABLET, FILM COATED ORAL at 09:39

## 2018-01-30 RX ADMIN — TRAMADOL HYDROCHLORIDE 50 MG: 50 TABLET, FILM COATED ORAL at 21:31

## 2018-01-30 RX ADMIN — OXYCODONE HYDROCHLORIDE AND ACETAMINOPHEN 1 TABLET: 7.5; 325 TABLET ORAL at 02:39

## 2018-01-30 RX ADMIN — OXYCODONE HYDROCHLORIDE AND ACETAMINOPHEN 1 TABLET: 7.5; 325 TABLET ORAL at 18:30

## 2018-01-30 RX ADMIN — TRAMADOL HYDROCHLORIDE 50 MG: 50 TABLET, FILM COATED ORAL at 09:36

## 2018-01-30 RX ADMIN — FLUTICASONE PROPIONATE 2 SPRAY: 50 SPRAY, METERED NASAL at 09:35

## 2018-01-30 RX ADMIN — VALACYCLOVIR 500 MG: 500 TABLET, FILM COATED ORAL at 09:37

## 2018-01-30 RX ADMIN — SPIRONOLACTONE 25 MG: 25 TABLET, FILM COATED ORAL at 09:38

## 2018-01-30 RX ADMIN — PANTOPRAZOLE SODIUM 40 MG: 40 TABLET, DELAYED RELEASE ORAL at 09:39

## 2018-01-30 RX ADMIN — OXYCODONE HYDROCHLORIDE AND ACETAMINOPHEN 1 TABLET: 7.5; 325 TABLET ORAL at 05:32

## 2018-01-30 RX ADMIN — TICAGRELOR 90 MG: 90 TABLET ORAL at 21:32

## 2018-01-30 RX ADMIN — FOLIC ACID 1 MG: 1 TABLET ORAL at 09:37

## 2018-01-30 RX ADMIN — LEVOTHYROXINE SODIUM 75 MCG: 50 TABLET ORAL at 05:31

## 2018-01-30 RX ADMIN — DOCUSATE SODIUM 200 MG: 100 CAPSULE, LIQUID FILLED ORAL at 09:38

## 2018-01-30 ASSESSMENT — PAIN SCALES - GENERAL
PAINLEVEL_OUTOF10: 3
PAINLEVEL_OUTOF10: 8
PAINLEVEL_OUTOF10: 1
PAINLEVEL_OUTOF10: 8
PAINLEVEL_OUTOF10: 3
PAINLEVEL_OUTOF10: 2
PAINLEVEL_OUTOF10: 8
PAINLEVEL_OUTOF10: 1

## 2018-01-30 NOTE — PROGRESS NOTES
Patient:   Lay Gardner  MR#:    [id number]   Room:    21/821-02   YOB: 1955  Date of Progress Note: 1/30/2018  Time of Note                           8:28 AM  Consulting Physician:   Jessie Chakraborty M.D. Attending Physician:  Jessie Chakraborty MD       CHIEF COMPLAINT:  Low back pain and left leg weakness        Subjective-  This 57 y.o. female  with history of rheumatoid arthritis. She had been having right back pain radiating down her leg. MRI done revealed Spondylolisthesis L3-5, L4-5 disc herniation, and lumbar stenosis L3-5. She was referred to orthopedic surgeon Dr. Kat Shipman. Dr. Kat Shipman recommended 2 stage L3-5 decompressive laminectomy and posterior spinal fusion L3-L5. She was in agreement and was admitted for stage 1 of her surgery on 1/17/18. She tolerated the procedure well. She went for stage 2 of her surgery on 1/19/18. She tolerated this procedure well. She has left lower extremity weakenss and is to wear a knee immobilizer when ambulating. She is also to wears a TLSO brace when out of bed. Left leg movement has improved. Having some facial flushing, likely related to prednisone. REVIEW OF SYSTEMS:  Constitutional: No fevers No chills  Neck:No stiffness  Respiratory: No shortness of breath  Cardiovascular: No chest pain No palpitations  Gastrointestinal: No abdominal pain    Genitourinary: No Dysuria  Neurological: No headache, no confusion      PHYSICAL EXAM:  /72   Pulse 62   Temp 97.2 °F (36.2 °C)   Resp 20   Ht 5' 6\" (1.676 m)   Wt 204 lb (92.5 kg)   SpO2 97%   Breastfeeding? No   BMI 32.93 kg/m²     Constitutional: she appears well-developed and well-nourished.    Eyes  conjunctiva normal.  Pupils react to light  Ear, nose, throat -hearing intact to finger rub No scars, masses, or lesions over external nose or ears, no atrophy of tongue  Neck-symmetric, no masses noted, no jugular vein distension  Respiration- chest wall appears symmetric, good expansion,   normal effort without use of accessory muscles  Musculoskeletal  no significant wasting of muscles noted, no bony deformities, gait no gross ataxia  Extremities-no clubbing, cyanosis or edema  Skin  warm, dry, and intact. No rash, erythema, or pallor. Psychiatric  mood, affect, and behavior appear normal.      Neurology  NEUROLOGICAL EXAM:      Mental status   Awake, alert, fluent oriented x 3 appropriate affect  Attention and concentration appear appropriate  Recent and remote memory appears unremarkable  Speech normal without dysarthria  No clear issues with language       Cranial Nerves   CN II- Visual fields grossly unremarkable  CN III, IV,VI-EOMI, No nystagmus, conjugate eye movements, no ptosis  CN VII-no facial assymetry  CN VIII-Hearing intact      Motor function  Antigravity x 4; 0/5 left hip adduction     Sensory function Intact to light touch     Cerebellar F-N intact     Tremor None present     Gait                  Not tested           Nursing/pcp notes, imaging,labs and vitals reviewed.      PT,OT and/or speech notes reviewed    Lab Results   Component Value Date    WBC 5.9 01/29/2018    HGB 11.2 (L) 01/29/2018    HCT 35.6 (L) 01/29/2018    MCV 94.7 01/29/2018     01/29/2018     Lab Results   Component Value Date     01/25/2018    K 3.7 01/25/2018     01/25/2018    CO2 28 01/25/2018    BUN 12 01/25/2018    CREATININE 0.5 01/25/2018    GLUCOSE 145 (H) 01/25/2018    CALCIUM 7.8 (L) 01/25/2018    PROT 6.6 02/19/2015    LABALBU 3.2 (L) 02/19/2015    ALKPHOS 101 02/19/2015    AST 24 02/19/2015    ALT 14 02/19/2015    LABGLOM >60 01/25/2018     Lab Results   Component Value Date    INR 1.12 02/19/2015    INR 1.33 (H) 05/14/2014    INR 1.17 05/13/2014     Lab Results   Component Value Date    CRP 1.90 (H) 05/14/2014     BED MOBILITY  Rolling: Modified independent  Supine to Sit: Modified independent  Sit to Supine: Minimal assistance  TRANSFERS  Sit to Stand: Stand by assistance  Bed to Chair: Stand by assistance     WHEELCHAIR PROPULSION  Level of Assistance: Stand by assistance  Distance: 300FT     AMBULATION  Device: Rolling Walker  Assistance: Contact guard assistance  Distance: 300FT  Quality of Gait: PATIENT MUST LOCK LLE TO PROGRESS RLE. SLIGHT LATERAL DEVIATION OF LEFT HIP WHEN PROGRESSING RLE. PATIENT REQUIRED VCS TO STAND TALL. RECORD REVIEW: Previous medical records, medications were reviewed at today's visit    IMPRESSION:   1. Lumbar spondylolisthesis and stenosis status post 2-stage laminectomy and fusion with left leg adduction weaknessPT/OT/pain control. Prednisone taper  2. Constipation likely drug-induced. Bowel regimen. Added Linzess and lactulose. 3. DVT prophylaxisSCDs  4. Essential hypertensionmedication. Stable. Monitor  5.  Coronary artery diseasecontinue Kristal Bentley following  ELOS: restaff tomorrow

## 2018-01-30 NOTE — PROGRESS NOTES
per tablet 1 tablet Q4H While awake   traMADol (ULTRAM) tablet 50 mg BID   linaclotide (LINZESS) capsule 893 mcg QAM AC   folic acid (FOLVITE) tablet 1 mg Daily   cetirizine (ZYRTEC) tablet 10 mg Daily   furosemide (LASIX) tablet 40 mg Daily   pantoprazole (PROTONIX) tablet 40 mg Daily   levothyroxine (SYNTHROID) tablet 75 mcg Daily   pregabalin (LYRICA) capsule 50 mg Daily   nitroGLYCERIN (NITROSTAT) SL tablet 0.4 mg Q5 Min PRN   ticagrelor (BRILINTA) tablet 90 mg BID   carvedilol (COREG) tablet 12.5 mg BID   fluticasone (FLONASE) 50 MCG/ACT nasal spray 2 spray Daily   spironolactone (ALDACTONE) tablet 25 mg Daily   vitamin D (ERGOCALCIFEROL) capsule 50,000 Units Weekly   acetaminophen (TYLENOL) tablet 650 mg Q4H PRN   magnesium hydroxide (MILK OF MAGNESIA) 400 MG/5ML suspension 30 mL Daily PRN   bisacodyl (DULCOLAX) suppository 10 mg Daily PRN   valACYclovir (VALTREX) tablet 500 mg Daily       Data:     Code Status:  Full Code    Family History   Problem Relation Age of Onset    Heart Attack Mother        Social History     Social History    Marital status:      Spouse name: N/A    Number of children: N/A    Years of education: N/A     Occupational History    Not on file. Social History Main Topics    Smoking status: Never Smoker    Smokeless tobacco: Never Used    Alcohol use Not on file    Drug use: Unknown    Sexual activity: Not on file     Other Topics Concern    Not on file     Social History Narrative    No narrative on file       Vitals:  BP (!) 181/80   Pulse 82   Temp 96.5 °F (35.8 °C) (Temporal)   Resp 18   Ht 5' 6\" (1.676 m)   Wt 204 lb (92.5 kg)   SpO2 96%   Breastfeeding? No   BMI 32.93 kg/m²   Temp (24hrs), Av.3 °F (35.7 °C), Min:96 °F (35.6 °C), Max:96.5 °F (35.8 °C)             I/O (24Hr):     Intake/Output Summary (Last 24 hours) at 18 1928  Last data filed at 18 1351   Gross per 24 hour   Intake              680 ml   Output                0 ml   Net neutropenia     History of pulmonary fibrosis     Hx of blood clots 1992    LT LEG    Hypercholesterolemia     Hypertension     Intrinsic asthma     Pacemaker 11/17/2016    Rheumatoid arteritis     Sinus node dysfunction (HCC)     Staph aureus infection     Status post placement of implantable loop recorder 2/13/15, 3/30/15    2/19/15  removed, infection    Syncope 2/11/2015    Thyroid disease     HYPOTHYROIDISM        Plan:        1. Back pain s/p lumbar laminectomy  2. Acute post operative blood loss anemia  3. Constipation  4. Rheumatoid Arthritis  5. HX CAD  6. Hypothyroidism    Continue current treatment. Monitor counts. Increase activity. Continue pain control efforts. Doing well. Questions answered.     Electronically signed by Daryn Bustos MD on 1/29/2018 at 7:28 PM

## 2018-01-31 PROCEDURE — 6370000000 HC RX 637 (ALT 250 FOR IP): Performed by: PSYCHIATRY & NEUROLOGY

## 2018-01-31 PROCEDURE — 6370000000 HC RX 637 (ALT 250 FOR IP): Performed by: PHYSICIAN ASSISTANT

## 2018-01-31 PROCEDURE — 1180000000 HC REHAB R&B

## 2018-01-31 PROCEDURE — 97530 THERAPEUTIC ACTIVITIES: CPT

## 2018-01-31 PROCEDURE — 6370000000 HC RX 637 (ALT 250 FOR IP): Performed by: NURSE PRACTITIONER

## 2018-01-31 PROCEDURE — 97110 THERAPEUTIC EXERCISES: CPT

## 2018-01-31 PROCEDURE — 97116 GAIT TRAINING THERAPY: CPT

## 2018-01-31 PROCEDURE — 99233 SBSQ HOSP IP/OBS HIGH 50: CPT | Performed by: PSYCHIATRY & NEUROLOGY

## 2018-01-31 RX ADMIN — PANTOPRAZOLE SODIUM 40 MG: 40 TABLET, DELAYED RELEASE ORAL at 09:35

## 2018-01-31 RX ADMIN — FLUTICASONE PROPIONATE 2 SPRAY: 50 SPRAY, METERED NASAL at 09:34

## 2018-01-31 RX ADMIN — TRAMADOL HYDROCHLORIDE 50 MG: 50 TABLET, FILM COATED ORAL at 09:35

## 2018-01-31 RX ADMIN — OXYCODONE HYDROCHLORIDE AND ACETAMINOPHEN 1 TABLET: 7.5; 325 TABLET ORAL at 09:38

## 2018-01-31 RX ADMIN — TICAGRELOR 90 MG: 90 TABLET ORAL at 09:36

## 2018-01-31 RX ADMIN — CLONIDINE HYDROCHLORIDE 0.2 MG: 0.2 TABLET ORAL at 17:59

## 2018-01-31 RX ADMIN — LEVOTHYROXINE SODIUM 75 MCG: 50 TABLET ORAL at 05:53

## 2018-01-31 RX ADMIN — DOCUSATE SODIUM 200 MG: 100 CAPSULE, LIQUID FILLED ORAL at 09:34

## 2018-01-31 RX ADMIN — OXYCODONE HYDROCHLORIDE AND ACETAMINOPHEN 1 TABLET: 7.5; 325 TABLET ORAL at 21:32

## 2018-01-31 RX ADMIN — OXYCODONE HYDROCHLORIDE AND ACETAMINOPHEN 1 TABLET: 7.5; 325 TABLET ORAL at 17:58

## 2018-01-31 RX ADMIN — OXYCODONE HYDROCHLORIDE AND ACETAMINOPHEN 1 TABLET: 7.5; 325 TABLET ORAL at 05:53

## 2018-01-31 RX ADMIN — OXYCODONE HYDROCHLORIDE AND ACETAMINOPHEN 1 TABLET: 7.5; 325 TABLET ORAL at 14:00

## 2018-01-31 RX ADMIN — PREGABALIN 50 MG: 50 CAPSULE ORAL at 09:36

## 2018-01-31 RX ADMIN — FUROSEMIDE 40 MG: 20 TABLET ORAL at 09:34

## 2018-01-31 RX ADMIN — CARVEDILOL 12.5 MG: 12.5 TABLET, FILM COATED ORAL at 09:36

## 2018-01-31 RX ADMIN — VALACYCLOVIR 500 MG: 500 TABLET, FILM COATED ORAL at 09:34

## 2018-01-31 RX ADMIN — CARVEDILOL 12.5 MG: 12.5 TABLET, FILM COATED ORAL at 21:33

## 2018-01-31 RX ADMIN — FOLIC ACID 1 MG: 1 TABLET ORAL at 09:36

## 2018-01-31 RX ADMIN — PREDNISONE 20 MG: 10 TABLET ORAL at 09:35

## 2018-01-31 RX ADMIN — TICAGRELOR 90 MG: 90 TABLET ORAL at 21:32

## 2018-01-31 RX ADMIN — TRAMADOL HYDROCHLORIDE 50 MG: 50 TABLET, FILM COATED ORAL at 21:32

## 2018-01-31 RX ADMIN — SPIRONOLACTONE 25 MG: 25 TABLET, FILM COATED ORAL at 09:36

## 2018-01-31 RX ADMIN — CETIRIZINE HYDROCHLORIDE 10 MG: 10 TABLET, FILM COATED ORAL at 09:34

## 2018-01-31 RX ADMIN — DOCUSATE SODIUM 200 MG: 100 CAPSULE, LIQUID FILLED ORAL at 21:33

## 2018-01-31 ASSESSMENT — ENCOUNTER SYMPTOMS
COUGH: 0
BLURRED VISION: 0
SHORTNESS OF BREATH: 0
DIARRHEA: 0
VOMITING: 0
DOUBLE VISION: 0
NAUSEA: 0

## 2018-01-31 ASSESSMENT — PAIN DESCRIPTION - PAIN TYPE
TYPE: CHRONIC PAIN
TYPE: CHRONIC PAIN

## 2018-01-31 ASSESSMENT — PAIN DESCRIPTION - LOCATION
LOCATION: BACK
LOCATION: BACK

## 2018-01-31 ASSESSMENT — PAIN SCALES - GENERAL
PAINLEVEL_OUTOF10: 3
PAINLEVEL_OUTOF10: 0
PAINLEVEL_OUTOF10: 2
PAINLEVEL_OUTOF10: 8
PAINLEVEL_OUTOF10: 2
PAINLEVEL_OUTOF10: 3
PAINLEVEL_OUTOF10: 2
PAINLEVEL_OUTOF10: 3

## 2018-01-31 ASSESSMENT — PAIN DESCRIPTION - ORIENTATION
ORIENTATION: LOWER
ORIENTATION: LOWER

## 2018-01-31 NOTE — PROGRESS NOTES
Patient:   Guadalupe Allred  MR#:    [id number]   Room:    [facility]/821-02   YOB: 1955  Date of Progress Note: 1/31/2018  Time of Note                           8:20 AM  Consulting Physician:   Zayda Escobar M.D. Attending Physician:  Zayda Escobar MD       CHIEF COMPLAINT:  Low back pain and left leg weakness        Subjective-  This 57 y.o. female  with history of rheumatoid arthritis. She had been having right back pain radiating down her leg. MRI done revealed Spondylolisthesis L3-5, L4-5 disc herniation, and lumbar stenosis L3-5. She was referred to orthopedic surgeon Dr. Mara Dobson. Dr. Mara Dobson recommended 2 stage L3-5 decompressive laminectomy and posterior spinal fusion L3-L5. She was in agreement and was admitted for stage 1 of her surgery on 1/17/18. She tolerated the procedure well. She went for stage 2 of her surgery on 1/19/18. She tolerated this procedure well. She has left lower extremity weakenss and is to wear a knee immobilizer when ambulating. She is also to wears a TLSO brace when out of bed. Left leg movement has improved. No c/o today    REVIEW OF SYSTEMS:  Constitutional: No fevers No chills  Neck:No stiffness  Respiratory: No shortness of breath  Cardiovascular: No chest pain No palpitations  Gastrointestinal: No abdominal pain    Genitourinary: No Dysuria  Neurological: No headache, no confusion      PHYSICAL EXAM:  /77   Pulse 64   Temp 97 °F (36.1 °C)   Resp 18   Ht 5' 6\" (1.676 m)   Wt 204 lb (92.5 kg)   SpO2 96%   Breastfeeding? No   BMI 32.93 kg/m²     Constitutional: she appears well-developed and well-nourished.    Eyes  conjunctiva normal.  Pupils react to light  Ear, nose, throat -hearing intact to finger rub No scars, masses, or lesions over external nose or ears, no atrophy of tongue  Neck-symmetric, no masses noted, no jugular vein distension  Respiration- chest wall appears symmetric, good expansion,   normal effort without use of accessory assistance  WHEELCHAIR PROPULSION  Level of Assistance: Stand by assistance  Distance: 300FT     AMBULATION  Device: Single point cane  Assistance: Stand by assistance, Contact guard assistance  Distance: 500'  Quality of Gait: guarded      RECORD REVIEW: Previous medical records, medications were reviewed at today's visit    IMPRESSION:   1. Lumbar spondylolisthesis and stenosis status post 2-stage laminectomy and fusion with left leg adduction weaknessPT/OT/pain control. Prednisone taper. Up ad lakhwinder in room  2. Constipation likely drug-induced. Bowel regimen. Added Linzess and lactulose. 3. DVT prophylaxisSCDs  4. Essential hypertensionmedication. Stable. Monitor  5. Coronary artery diseasecontinue Kristal Vera [address] [facility] following  Staffing this date , mutlidisciplinary with entire team with complex decision making and planning for discharge  ELOS: this Friday.

## 2018-01-31 NOTE — PROGRESS NOTES
Saul Bolton M.D.  Carloyn Frankel, M.D. MELISSA Vega      Internal Medicine Progress Note    1/31/2018   3:16 PM    Name:  Leon Marshall  MRN:    [id number]     Acct:     [de-identified]   Room:  [address] [place] [address] Day: 9     Admit Date: 1/22/2018  3:05 PM  PCP: Scooby Whitney MD    Subjective:     C/C: weakness    Interval History: Status: improved. Sitting in bed. Resting after finishing therapy for the day. Review of Systems   Constitutional: Negative for chills and fever. HENT: Negative for congestion. Eyes: Negative for blurred vision and double vision. Respiratory: Negative for cough and shortness of breath. Cardiovascular: Negative for chest pain and leg swelling. Gastrointestinal: Negative for diarrhea, nausea and vomiting. Genitourinary: Negative for dysuria and urgency. Musculoskeletal: Negative for joint pain and myalgias. Skin: Negative for itching and rash. Neurological: Positive for tingling and weakness. Negative for dizziness, focal weakness and headaches. Psychiatric/Behavioral: Negative for depression. The patient is not nervous/anxious. Medications: Allergies:    Allergies   Allergen Reactions    Amoxicillin     Lipitor     Niaspan [Niacin Er]     Penicillins     Relafen [Nabumetone]     Zocor [Simvastatin]        Current Meds:     [START ON 2/1/2018] predniSONE (DELTASONE) tablet 10 mg Daily   Followed by    Bijan Brochure ON 2/2/2018] predniSONE (DELTASONE) tablet 5 mg Daily   lactulose (CHRONULAC) 10 GM/15ML solution 20 g TID   docusate sodium (COLACE) capsule 200 mg BID   polyethylene glycol (GLYCOLAX) packet 17 g BID   methylnaltrexone (RELISTOR) injection 12 mg Every Other Day   cloNIDine (CATAPRES) tablet 0.2 mg QPM   oxyCODONE-acetaminophen (PERCOCET) 7.5-325 MG per tablet 1 tablet Q4H While awake   traMADol (ULTRAM) tablet 50 mg BID   linaclotide (LINZESS) capsule 590 mcg QAM AC   folic acid (FOLVITE) tablet 1 mg Daily time. She appears well-developed and well-nourished. No distress. HENT:   Head: Normocephalic and atraumatic. Mouth/Throat: Oropharynx is clear and moist.   Eyes: EOM are normal. Pupils are equal, round, and reactive to light. No scleral icterus. Neck: Normal range of motion. Neck supple. No tracheal deviation present. No thyromegaly present. Cardiovascular: Normal rate, regular rhythm and intact distal pulses. Pulmonary/Chest: Effort normal and breath sounds normal. No respiratory distress. She exhibits no tenderness. Abdominal: Soft. Bowel sounds are normal. She exhibits no distension. There is no tenderness. Musculoskeletal: Normal range of motion. She exhibits tenderness (with rom in low back). She exhibits no edema. Neurological: She is alert and oriented to person, place, and time. She displays normal reflexes. No cranial nerve deficit. Skin: Skin is warm and dry. Bruising to lumbar/sacral regions   Psychiatric: She has a normal mood and affect. Her behavior is normal.   Nursing note and vitals reviewed. Assessment:        Primary Problem  Spondylolisthesis of lumbar region     Active Hospital Problems    Diagnosis Date Noted    Spondylolisthesis of lumbar region [M43.16] 01/23/2018    Rheumatoid arteritis [I00]     Hypertension [I10]      Past Medical History:   Diagnosis Date    CAD (coronary artery disease)     S/p Stent to right coronary artery 9/2010. S/p myocardial infarction May 2013.     Cellulitis     LT LEG IN PAST    History of blood transfusion     History of DVT (deep vein thrombosis)     LT    History of neutropenia     History of pulmonary fibrosis     Hx of blood clots 1992    LT LEG    Hypercholesterolemia     Hypertension     Intrinsic asthma     Pacemaker 11/17/2016    Rheumatoid arteritis     Sinus node dysfunction (HCC)     Staph aureus infection     Status post placement of implantable loop recorder 2/13/15, 3/30/15    2/19/15  removed, infection

## 2018-02-01 LAB
ANION GAP SERPL CALCULATED.3IONS-SCNC: 9 MMOL/L (ref 7–19)
BASOPHILS ABSOLUTE: 0 K/UL (ref 0–0.2)
BASOPHILS RELATIVE PERCENT: 0.5 % (ref 0–1)
BUN BLDV-MCNC: 22 MG/DL (ref 8–23)
CALCIUM SERPL-MCNC: 8.1 MG/DL (ref 8.8–10.2)
CHLORIDE BLD-SCNC: 100 MMOL/L (ref 98–111)
CO2: 30 MMOL/L (ref 22–29)
CREAT SERPL-MCNC: 0.7 MG/DL (ref 0.5–0.9)
EOSINOPHILS ABSOLUTE: 0 K/UL (ref 0–0.6)
EOSINOPHILS RELATIVE PERCENT: 0.2 % (ref 0–5)
GFR NON-AFRICAN AMERICAN: >60
GLUCOSE BLD-MCNC: 145 MG/DL (ref 74–109)
HCT VFR BLD CALC: 35.3 % (ref 37–47)
HEMOGLOBIN: 10.8 G/DL (ref 12–16)
LYMPHOCYTES ABSOLUTE: 2.7 K/UL (ref 1.1–4.5)
LYMPHOCYTES RELATIVE PERCENT: 32.2 % (ref 20–40)
MCH RBC QN AUTO: 29.3 PG (ref 27–31)
MCHC RBC AUTO-ENTMCNC: 30.6 G/DL (ref 33–37)
MCV RBC AUTO: 95.7 FL (ref 81–99)
MONOCYTES ABSOLUTE: 0.9 K/UL (ref 0–0.9)
MONOCYTES RELATIVE PERCENT: 10.4 % (ref 0–10)
NEUTROPHILS ABSOLUTE: 4.5 K/UL (ref 1.5–7.5)
NEUTROPHILS RELATIVE PERCENT: 55.2 % (ref 50–65)
PDW BLD-RTO: 16.2 % (ref 11.5–14.5)
PLATELET # BLD: 215 K/UL (ref 130–400)
PMV BLD AUTO: 10.8 FL (ref 9.4–12.3)
POTASSIUM SERPL-SCNC: 4.2 MMOL/L (ref 3.5–5)
RBC # BLD: 3.69 M/UL (ref 4.2–5.4)
SODIUM BLD-SCNC: 139 MMOL/L (ref 136–145)
WBC # BLD: 8.2 K/UL (ref 4.8–10.8)

## 2018-02-01 PROCEDURE — 97110 THERAPEUTIC EXERCISES: CPT

## 2018-02-01 PROCEDURE — 6370000000 HC RX 637 (ALT 250 FOR IP): Performed by: PHYSICIAN ASSISTANT

## 2018-02-01 PROCEDURE — 1180000000 HC REHAB R&B

## 2018-02-01 PROCEDURE — 85025 COMPLETE CBC W/AUTO DIFF WBC: CPT

## 2018-02-01 PROCEDURE — 80048 BASIC METABOLIC PNL TOTAL CA: CPT

## 2018-02-01 PROCEDURE — 6370000000 HC RX 637 (ALT 250 FOR IP): Performed by: PSYCHIATRY & NEUROLOGY

## 2018-02-01 PROCEDURE — 97116 GAIT TRAINING THERAPY: CPT

## 2018-02-01 PROCEDURE — 97530 THERAPEUTIC ACTIVITIES: CPT

## 2018-02-01 PROCEDURE — 6370000000 HC RX 637 (ALT 250 FOR IP): Performed by: NURSE PRACTITIONER

## 2018-02-01 PROCEDURE — 36415 COLL VENOUS BLD VENIPUNCTURE: CPT

## 2018-02-01 RX ADMIN — TICAGRELOR 90 MG: 90 TABLET ORAL at 20:24

## 2018-02-01 RX ADMIN — VALACYCLOVIR 500 MG: 500 TABLET, FILM COATED ORAL at 09:22

## 2018-02-01 RX ADMIN — PANTOPRAZOLE SODIUM 40 MG: 40 TABLET, DELAYED RELEASE ORAL at 09:22

## 2018-02-01 RX ADMIN — PREDNISONE 10 MG: 10 TABLET ORAL at 09:22

## 2018-02-01 RX ADMIN — OXYCODONE HYDROCHLORIDE AND ACETAMINOPHEN 1 TABLET: 7.5; 325 TABLET ORAL at 20:24

## 2018-02-01 RX ADMIN — OXYCODONE HYDROCHLORIDE AND ACETAMINOPHEN 1 TABLET: 7.5; 325 TABLET ORAL at 15:20

## 2018-02-01 RX ADMIN — CLONIDINE HYDROCHLORIDE 0.2 MG: 0.2 TABLET ORAL at 17:50

## 2018-02-01 RX ADMIN — FLUTICASONE PROPIONATE 2 SPRAY: 50 SPRAY, METERED NASAL at 09:21

## 2018-02-01 RX ADMIN — CARVEDILOL 12.5 MG: 12.5 TABLET, FILM COATED ORAL at 20:25

## 2018-02-01 RX ADMIN — LEVOTHYROXINE SODIUM 75 MCG: 50 TABLET ORAL at 05:55

## 2018-02-01 RX ADMIN — TRAMADOL HYDROCHLORIDE 50 MG: 50 TABLET, FILM COATED ORAL at 20:23

## 2018-02-01 RX ADMIN — DOCUSATE SODIUM 200 MG: 100 CAPSULE, LIQUID FILLED ORAL at 09:22

## 2018-02-01 RX ADMIN — FUROSEMIDE 40 MG: 20 TABLET ORAL at 09:22

## 2018-02-01 RX ADMIN — TRAMADOL HYDROCHLORIDE 50 MG: 50 TABLET, FILM COATED ORAL at 09:22

## 2018-02-01 RX ADMIN — CETIRIZINE HYDROCHLORIDE 10 MG: 10 TABLET, FILM COATED ORAL at 09:22

## 2018-02-01 RX ADMIN — OXYCODONE HYDROCHLORIDE AND ACETAMINOPHEN 1 TABLET: 7.5; 325 TABLET ORAL at 09:23

## 2018-02-01 RX ADMIN — SPIRONOLACTONE 25 MG: 25 TABLET, FILM COATED ORAL at 11:22

## 2018-02-01 RX ADMIN — FOLIC ACID 1 MG: 1 TABLET ORAL at 09:22

## 2018-02-01 RX ADMIN — OXYCODONE HYDROCHLORIDE AND ACETAMINOPHEN 1 TABLET: 7.5; 325 TABLET ORAL at 17:50

## 2018-02-01 RX ADMIN — TICAGRELOR 90 MG: 90 TABLET ORAL at 09:22

## 2018-02-01 RX ADMIN — DOCUSATE SODIUM 200 MG: 100 CAPSULE, LIQUID FILLED ORAL at 20:23

## 2018-02-01 RX ADMIN — CARVEDILOL 12.5 MG: 12.5 TABLET, FILM COATED ORAL at 09:23

## 2018-02-01 RX ADMIN — PREGABALIN 50 MG: 50 CAPSULE ORAL at 09:22

## 2018-02-01 RX ADMIN — OXYCODONE HYDROCHLORIDE AND ACETAMINOPHEN 1 TABLET: 7.5; 325 TABLET ORAL at 05:55

## 2018-02-01 ASSESSMENT — PAIN SCALES - GENERAL
PAINLEVEL_OUTOF10: 4
PAINLEVEL_OUTOF10: 6
PAINLEVEL_OUTOF10: 2
PAINLEVEL_OUTOF10: 2
PAINLEVEL_OUTOF10: 5
PAINLEVEL_OUTOF10: 2
PAINLEVEL_OUTOF10: 5
PAINLEVEL_OUTOF10: 4
PAINLEVEL_OUTOF10: 3

## 2018-02-01 ASSESSMENT — PAIN DESCRIPTION - DESCRIPTORS: DESCRIPTORS: ACHING

## 2018-02-01 ASSESSMENT — PAIN DESCRIPTION - PROGRESSION
CLINICAL_PROGRESSION: GRADUALLY IMPROVING
CLINICAL_PROGRESSION: GRADUALLY IMPROVING

## 2018-02-01 ASSESSMENT — PAIN DESCRIPTION - ORIENTATION
ORIENTATION: LOWER
ORIENTATION: LOWER

## 2018-02-01 ASSESSMENT — PAIN DESCRIPTION - FREQUENCY: FREQUENCY: CONTINUOUS

## 2018-02-01 ASSESSMENT — PAIN DESCRIPTION - PAIN TYPE: TYPE: SURGICAL PAIN;CHRONIC PAIN

## 2018-02-01 ASSESSMENT — PAIN DESCRIPTION - LOCATION
LOCATION: BACK
LOCATION: BACK

## 2018-02-01 ASSESSMENT — PAIN DESCRIPTION - ONSET: ONSET: ON-GOING

## 2018-02-02 VITALS
WEIGHT: 204 LBS | OXYGEN SATURATION: 98 % | DIASTOLIC BLOOD PRESSURE: 69 MMHG | RESPIRATION RATE: 16 BRPM | BODY MASS INDEX: 32.78 KG/M2 | HEART RATE: 64 BPM | HEIGHT: 66 IN | SYSTOLIC BLOOD PRESSURE: 123 MMHG | TEMPERATURE: 97.7 F

## 2018-02-02 PROCEDURE — 6370000000 HC RX 637 (ALT 250 FOR IP): Performed by: PHYSICIAN ASSISTANT

## 2018-02-02 PROCEDURE — 6370000000 HC RX 637 (ALT 250 FOR IP): Performed by: PSYCHIATRY & NEUROLOGY

## 2018-02-02 PROCEDURE — 99239 HOSP IP/OBS DSCHRG MGMT >30: CPT | Performed by: PSYCHIATRY & NEUROLOGY

## 2018-02-02 RX ORDER — OXYCODONE AND ACETAMINOPHEN 7.5; 325 MG/1; MG/1
1 TABLET ORAL
Qty: 120 TABLET | Refills: 0 | Status: SHIPPED | OUTPATIENT
Start: 2018-02-02 | End: 2018-02-09

## 2018-02-02 RX ORDER — TRAMADOL HYDROCHLORIDE 50 MG/1
50 TABLET ORAL 2 TIMES DAILY
Qty: 60 TABLET | Refills: 0 | Status: SHIPPED | OUTPATIENT
Start: 2018-02-02 | End: 2018-02-12

## 2018-02-02 RX ORDER — VALACYCLOVIR HYDROCHLORIDE 500 MG/1
500 TABLET, FILM COATED ORAL DAILY
Qty: 30 TABLET | Refills: 0 | Status: SHIPPED | OUTPATIENT
Start: 2018-02-02

## 2018-02-02 RX ORDER — PSEUDOEPHEDRINE HCL 30 MG
200 TABLET ORAL 2 TIMES DAILY
Qty: 60 CAPSULE | Refills: 0 | Status: SHIPPED | OUTPATIENT
Start: 2018-02-02 | End: 2018-09-04

## 2018-02-02 RX ORDER — PANTOPRAZOLE SODIUM 40 MG/1
40 TABLET, DELAYED RELEASE ORAL DAILY
Qty: 30 TABLET | Refills: 3 | Status: SHIPPED | OUTPATIENT
Start: 2018-02-02

## 2018-02-02 RX ORDER — PREGABALIN 50 MG/1
50 CAPSULE ORAL DAILY
Qty: 60 CAPSULE | Refills: 0 | Status: SHIPPED | OUTPATIENT
Start: 2018-02-02 | End: 2021-03-26

## 2018-02-02 RX ADMIN — VALACYCLOVIR 500 MG: 500 TABLET, FILM COATED ORAL at 08:05

## 2018-02-02 RX ADMIN — PANTOPRAZOLE SODIUM 40 MG: 40 TABLET, DELAYED RELEASE ORAL at 08:06

## 2018-02-02 RX ADMIN — CARVEDILOL 12.5 MG: 12.5 TABLET, FILM COATED ORAL at 08:05

## 2018-02-02 RX ADMIN — OXYCODONE HYDROCHLORIDE AND ACETAMINOPHEN 1 TABLET: 7.5; 325 TABLET ORAL at 06:00

## 2018-02-02 RX ADMIN — TICAGRELOR 90 MG: 90 TABLET ORAL at 08:06

## 2018-02-02 RX ADMIN — CETIRIZINE HYDROCHLORIDE 10 MG: 10 TABLET, FILM COATED ORAL at 08:06

## 2018-02-02 RX ADMIN — SPIRONOLACTONE 25 MG: 25 TABLET, FILM COATED ORAL at 08:06

## 2018-02-02 RX ADMIN — TRAMADOL HYDROCHLORIDE 50 MG: 50 TABLET, FILM COATED ORAL at 08:05

## 2018-02-02 RX ADMIN — FUROSEMIDE 40 MG: 20 TABLET ORAL at 08:06

## 2018-02-02 RX ADMIN — LEVOTHYROXINE SODIUM 75 MCG: 50 TABLET ORAL at 06:00

## 2018-02-02 RX ADMIN — FOLIC ACID 1 MG: 1 TABLET ORAL at 08:06

## 2018-02-02 RX ADMIN — PREDNISONE 5 MG: 5 TABLET ORAL at 08:06

## 2018-02-02 RX ADMIN — PREGABALIN 50 MG: 50 CAPSULE ORAL at 08:06

## 2018-02-02 ASSESSMENT — PAIN SCALES - GENERAL
PAINLEVEL_OUTOF10: 2
PAINLEVEL_OUTOF10: 3
PAINLEVEL_OUTOF10: 3

## 2018-02-02 NOTE — PLAN OF CARE
Problem: Falls - Risk of  Goal: Absence of falls  Outcome: Ongoing  Will remain free of injury from fall. Continue to use call light for any assistance needed. Problem: Pain:  Goal: Control of acute pain  Control of acute pain   Outcome: Ongoing  Patient will state pain improved to tolerable level with use of pain medication. Problem: Wound:  Goal: Demonstration of wound healing without infection will improve  Demonstration of wound healing without infection will improve     Outcome: Ongoing  Skin issues will continue to heal w/o s/s infection. Will not have new skin breakdown.

## 2018-02-02 NOTE — PROGRESS NOTES
Lower Extremity Weight Bearing Weight Bearing As Tolerated   Left Lower Extremity Weight Bearing Weight Bearing As Tolerated   Required Braces or Orthoses   Spinal Lumbar Corset   Position Activity Restriction   Spinal Precautions No Bending; No Lifting; No Twisting   Pain Assessment   Pain Level 5   Pain Assessment 0-10   Pain Location Back   Pain Orientation Lower   Transfers   Sit to Stand Independent   Stand to sit Independent   Bed to Chair Modified independent   Car Transfer Stand by assistance   Ambulation 1   Surface level tile   Device Rollator   Other Apparatus (LSO )   Assistance Supervision   Distance 250ft x2   Comments Pt educated on using rollator instead of cane on days of increased pain. Ambulation 2   Surface - 2 level tile   Device 2 Single point cane   Other Apparatus 2 (LSO)   Assistance 2 Stand by assistance   Quality of Gait 2 occasional buckling L knee, pt self corrects   Distance 250ft x   Other exercises   Other exercises? Yes   Other exercises 1 side stepping 20ft x2 (to R and L)   Conditions Requiring Skilled Therapeutic Intervention   Body structures, Functions, Activity limitations Decreased functional mobility ; Decreased ROM; Decreased strength;Decreased endurance;Decreased sensation   Assessment Pt tolerated treatment well but c/o of increased pain in back today at the start of session. Pt was educated on using rollator instead of cane on days with increased pain. Pt was able to perform car transfer but still required VCS for more efficient technique.     Activity Tolerance   Activity Tolerance Patient Tolerated treatment well   Electronically signed by Eve Turner PT on 2/2/2018 at 8:12 AM

## 2018-02-05 ENCOUNTER — TELEPHONE (OUTPATIENT)
Dept: CARDIOLOGY | Age: 63
End: 2018-02-05

## 2018-02-05 NOTE — DISCHARGE SUMMARY
Physical Therapy Discharge Note  DATE:  2018  NAME:  Pierre Matta  :  1955  (62 y.o.,female)  MRN:  [id number]    HEIGHT:  Height: 5' 6\" (167.6 cm)  WEIGHT:  Weight: 204 lb (92.5 kg)    PATIENT DIAGNOSIS(ES):    Diagnosis: POST-OP 2 STAGE LAMINECTOMY AND SPINAL FUSION      RESTRICTIONS/PRECAUTIONS:    Restrictions/Precautions: Up Ad Madisyn  Right Lower Extremity Weight Bearing: Weight Bearing As Tolerated  Left Lower Extremity Weight Bearing: Weight Bearing As Tolerated  Required Braces or Orthoses?: Yes     OVERALL  ORIENTATION STATUS:  Overall Orientation Status: Within Normal Limits     NEUROLOGICAL  Overall Sensation Status: Impaired     POSTURE  WNL     STRENGTH  Strength RLE  Strength RLE: WFL  Strength LLE  Comment: DF/PF 5/5, HIP ADD/ABD 3+/5, KNEE EXTENSION 3/5     ROM  AROM RLE (degrees)  RLE AROM: WFL  AROM LLE (degrees)  LLE General AROM: UNABLE TO FULLY EXTEND KNEE, DIFFICULTY WITH HIP ABD/ADD      BALANCE  Sitting - Static: Good        ACTIVITY TOLERANCE  Activity Tolerance: Patient Tolerated treatment well     BED MOBILITY  Rolling: Independent  Supine to Sit: Independent  Sit to Supine: Independent     TRANSFERS  Sit to Stand: Independent  Bed to Chair: Independent  Car Transfer: Stand by assistance     WHEELCHAIR PROPULSION  Level of Assistance: Stand by assistance  Distance: 300FT     AMBULATION  Device: Rollator  Assistance:  (LSO)  Distance: 200  Quality of Gait: guarded but no LOB     STAIRS  # Steps : 12  Rails: Left ascending  Assistance: Modified independent   Comment: Patient able to perform stair training, no L knee buckle noted.          FIM SCORES    CURRENT  Bed, Chair, Wheel Chair: 7 - Patient independently transfers  Walk: 6 - Modified [place]  Walks/operates wheelchair at least 150 feet with an ambulatory device, orthosis or prosthesis OR requires extra amount of time OR there is concern for safety  Wheel Chair: 5 - Supervision Requires standby supervision or cuing to
stage 1 of her surgery on 1/17/18. She tolerated the procedure well. She went for stage 2 of her surgery on 1/19/18. She tolerated this procedure well. She has left lower extremity weakenss and is to wear a knee immobilizer when ambulating. She is also to wears a TLSO brace when out of bed. Patient did relatively well while here. She continues to have weakness of left hip flexion. Her hip adduction weakness at significantly improved. She was put on a tapering dose of prednisone beginning at 50 mg a day with good improvement. Had some difficulties with constipation which are currently managed only with a stool softener.     Discharge Instructions     Patient Instructions:   Outpatient Therapy  Therapy orders: PT   Discharge lab work: none  Code status: Full Code   Activity: activity as tolerated  Diet: DIET GENERAL;    Wound Care: as directed  Equipment: as per therapy      Shena Edwards MD    At least 35 minutes were spent in discharging the patient

## 2018-02-06 ENCOUNTER — CARE COORDINATION (OUTPATIENT)
Dept: CASE MANAGEMENT | Age: 63
End: 2018-02-06

## 2018-02-06 DIAGNOSIS — M43.16 SPONDYLOLISTHESIS OF LUMBAR REGION: Primary | ICD-10-CM

## 2018-02-06 PROCEDURE — 1111F DSCHRG MED/CURRENT MED MERGE: CPT | Performed by: INTERNAL MEDICINE

## 2018-02-08 DIAGNOSIS — I25.10 CORONARY ARTERY DISEASE INVOLVING NATIVE HEART WITHOUT ANGINA PECTORIS, UNSPECIFIED VESSEL OR LESION TYPE: ICD-10-CM

## 2018-02-09 ENCOUNTER — TELEPHONE (OUTPATIENT)
Dept: CARDIOLOGY | Age: 63
End: 2018-02-09

## 2018-02-09 ENCOUNTER — CARE COORDINATION (OUTPATIENT)
Dept: CASE MANAGEMENT | Age: 63
End: 2018-02-09

## 2018-02-09 NOTE — PATIENT INSTRUCTIONS
Patient Education        Influenza (Flu): Care Instructions  Your Care Instructions    Influenza (flu) is an infection in the lungs and breathing passages. It is caused by the influenza virus. There are different strains, or types, of the flu virus from year to year. Unlike the common cold, the flu comes on suddenly and the symptoms, such as a cough, congestion, fever, chills, fatigue, aches, and pains, are more severe. These symptoms may last up to 10 days. Although the flu can make you feel very sick, it usually doesn't cause serious health problems. Home treatment is usually all you need for flu symptoms. But your doctor may prescribe antiviral medicine to prevent other health problems, such as pneumonia, from developing. Older people and those who have a long-term health condition, such as lung disease, are most at risk for having pneumonia or other health problems. Follow-up care is a key part of your treatment and safety. Be sure to make and go to all appointments, and call your doctor if you are having problems. It's also a good idea to know your test results and keep a list of the medicines you take. How can you care for yourself at home? · Get plenty of rest.  · Drink plenty of fluids, enough so that your urine is light yellow or clear like water. If you have kidney, heart, or liver disease and have to limit fluids, talk with your doctor before you increase the amount of fluids you drink. · Take an over-the-counter pain medicine if needed, such as acetaminophen (Tylenol), ibuprofen (Advil, Motrin), or naproxen (Aleve), to relieve fever, headache, and muscle aches. Read and follow all instructions on the label. No one younger than 20 should take aspirin. It has been linked to Reye syndrome, a serious illness. · Do not smoke. Smoking can make the flu worse. If you need help quitting, talk to your doctor about stop-smoking programs and medicines.  These can increase your chances of quitting for your doctor if:  ? · You begin to get better and then get worse. ? · You are not getting better after 1 week. Where can you learn more? Go to [email or web address] and sign in to your [employer] account. Enter I053 in the [facility] box to learn more about \"Influenza (Flu): Care Instructions. \"     If you do not have an account, please click on the \"Sign Up Now\" link. Current as of: May 12, 2017  Content Version: 11.5  © 3082-0099 Healthwise, Incorporated. Care instructions adapted under license by [facility] [address] If you have questions about a medical condition or this instruction, always ask your healthcare professional. [address] any warranty or liability for your use of this information.

## 2018-02-09 NOTE — TELEPHONE ENCOUNTER
Patient called and stated she thinks her carelink monitor worked last night. We did not receive transmission. Reviewed her monitor serial number. She has the monitor she used for her loop recorder. She stated she never reviewed the mycarelink smart monitor that was sent to her on 11/30/17. Contacted [employer]. Ordered her a new mycarelink monitor. She will send carelink when she gets new monitor.

## 2018-02-14 ENCOUNTER — CARE COORDINATION (OUTPATIENT)
Dept: CASE MANAGEMENT | Age: 63
End: 2018-02-14

## 2018-02-15 ENCOUNTER — CARE COORDINATION (OUTPATIENT)
Dept: CASE MANAGEMENT | Age: 63
End: 2018-02-15

## 2018-02-15 DIAGNOSIS — Z95.0 PACEMAKER: Primary | ICD-10-CM

## 2018-02-15 DIAGNOSIS — I49.5 SINUS NODE DYSFUNCTION (HCC): ICD-10-CM

## 2018-02-15 PROCEDURE — 93296 REM INTERROG EVL PM/IDS: CPT | Performed by: NURSE PRACTITIONER

## 2018-02-15 PROCEDURE — 93294 REM INTERROG EVL PM/LDLS PM: CPT | Performed by: NURSE PRACTITIONER

## 2018-02-15 NOTE — CARE COORDINATION
[address] Transitions Follow Up Call    2/15/2018    Patient: Isidra Telles  Patient : 1955   MRN: [id number]  Reason for Admission: There are no discharge diagnoses documented for the most recent discharge. Discharge Date: 18 RARS: Risk Score: 18.5       Spoke with: [address] Transitions Subsequent and Final Call    Subsequent and Final Calls  Do you have any ongoing symptoms?:  No  Have your medications changed?:  No  Do you have any questions related to your medications?:  No  Do you currently have any active services?:  Yes  Are you currently active with any services?:  Home Health  Do you have any needs or concerns that I can assist you with?:  No  Identified Barriers:  None  Care Transitions Interventions  Other Interventions: Follow Up: Spoke with patient to check on her status for today. She reported that she has been doing much better. Reported that she has had one fall since the last time we spoke. She reported that the therapist was working with her on steps and he wanted her to go up with her bad leg and down she went. She reported that she did not hurt herself in any way and has not so far with any of her falls. Her back has not been bothering her. She reported that everything else is going well. Had to cut call short, as she got another phone call. Unable to do any further assessment or education. Will end CTC today as patient seems to be stable at this time.   Future Appointments  Date Time Provider Troy Muniz   2018 9:30 AM MELISSA Dennis [facility] [place]   2018 11:30 AM Sara Garcia MD [facility] [place]       Seble Ding RN

## 2018-03-08 ENCOUNTER — TELEPHONE (OUTPATIENT)
Dept: OBSTETRICS AND GYNECOLOGY | Facility: CLINIC | Age: 63
End: 2018-03-08

## 2018-03-08 DIAGNOSIS — M81.0 OSTEOPOROSIS, UNSPECIFIED OSTEOPOROSIS TYPE, UNSPECIFIED PATHOLOGICAL FRACTURE PRESENCE: Primary | ICD-10-CM

## 2018-03-08 NOTE — TELEPHONE ENCOUNTER
ORDER PUT IN FOR PROLIA WITH [facility]    PLEASE PUT IN CMP TO BE DRAWN BEFORE PROLIA ON 04/25/2018

## 2018-03-12 ENCOUNTER — LAB REQUISITION (OUTPATIENT)
Dept: LAB | Facility: HOSPITAL | Age: 63
End: 2018-03-12

## 2018-03-12 ENCOUNTER — APPOINTMENT (OUTPATIENT)
Dept: WOUND CARE | Facility: HOSPITAL | Age: 63
End: 2018-03-12

## 2018-03-12 DIAGNOSIS — Z00.00 ENCOUNTER FOR GENERAL ADULT MEDICAL EXAMINATION WITHOUT ABNORMAL FINDINGS: ICD-10-CM

## 2018-03-12 PROCEDURE — G0463 HOSPITAL OUTPT CLINIC VISIT: HCPCS

## 2018-03-12 PROCEDURE — 87147 CULTURE TYPE IMMUNOLOGIC: CPT | Performed by: SURGERY

## 2018-03-12 PROCEDURE — 87075 CULTR BACTERIA EXCEPT BLOOD: CPT | Performed by: SURGERY

## 2018-03-12 PROCEDURE — 87205 SMEAR GRAM STAIN: CPT | Performed by: SURGERY

## 2018-03-12 PROCEDURE — 87185 SC STD ENZYME DETCJ PER NZM: CPT | Performed by: SURGERY

## 2018-03-12 PROCEDURE — 87186 SC STD MICRODIL/AGAR DIL: CPT | Performed by: SURGERY

## 2018-03-12 PROCEDURE — 87176 TISSUE HOMOGENIZATION CULTR: CPT | Performed by: SURGERY

## 2018-03-12 PROCEDURE — 87070 CULTURE OTHR SPECIMN AEROBIC: CPT | Performed by: SURGERY

## 2018-03-15 LAB
B-LACTAMASE USUAL SUSC ISLT: POSITIVE
BACTERIA SPEC AEROBE CULT: ABNORMAL
BACTERIA SPEC AEROBE CULT: ABNORMAL
GRAM STN SPEC: ABNORMAL

## 2018-03-16 DIAGNOSIS — I25.10 CORONARY ARTERY DISEASE INVOLVING NATIVE HEART WITHOUT ANGINA PECTORIS, UNSPECIFIED VESSEL OR LESION TYPE: ICD-10-CM

## 2018-03-16 LAB — BACTERIA SPEC ANAEROBE CULT: NORMAL

## 2018-03-16 RX ORDER — TICAGRELOR 90 MG/1
TABLET ORAL
Qty: 180 TABLET | Refills: 3 | Status: SHIPPED | OUTPATIENT
Start: 2018-03-16 | End: 2018-05-02

## 2018-03-19 ENCOUNTER — APPOINTMENT (OUTPATIENT)
Dept: WOUND CARE | Facility: HOSPITAL | Age: 63
End: 2018-03-19

## 2018-03-26 ENCOUNTER — APPOINTMENT (OUTPATIENT)
Dept: WOUND CARE | Facility: HOSPITAL | Age: 63
End: 2018-03-26

## 2018-03-29 ENCOUNTER — TRANSCRIBE ORDERS (OUTPATIENT)
Dept: ADMINISTRATIVE | Facility: HOSPITAL | Age: 63
End: 2018-03-29

## 2018-03-29 ENCOUNTER — LAB (OUTPATIENT)
Dept: LAB | Facility: HOSPITAL | Age: 63
End: 2018-03-29
Attending: INTERNAL MEDICINE

## 2018-03-29 DIAGNOSIS — E78.5 HYPERLIPIDEMIA, UNSPECIFIED HYPERLIPIDEMIA TYPE: ICD-10-CM

## 2018-03-29 DIAGNOSIS — I10 BENIGN HYPERTENSION: ICD-10-CM

## 2018-03-29 DIAGNOSIS — D63.8 ANEMIA OF CHRONIC DISEASE: ICD-10-CM

## 2018-03-29 DIAGNOSIS — E55.9 VITAMIN D DEFICIENCY: Primary | ICD-10-CM

## 2018-03-29 DIAGNOSIS — E55.9 VITAMIN D DEFICIENCY: ICD-10-CM

## 2018-03-29 LAB
25(OH)D3 SERPL-MCNC: 66.4 NG/ML (ref 30–100)
ALBUMIN SERPL-MCNC: 3.6 G/DL (ref 3.5–5)
ALBUMIN/GLOB SERPL: 1 G/DL (ref 1.1–2.5)
ALP SERPL-CCNC: 99 U/L (ref 24–120)
ALT SERPL W P-5'-P-CCNC: 29 U/L (ref 0–54)
ANION GAP SERPL CALCULATED.3IONS-SCNC: 8 MMOL/L (ref 4–13)
AST SERPL-CCNC: 29 U/L (ref 7–45)
AUTO MIXED CELLS #: 0.5 10*3/MM3 (ref 0.1–2.6)
AUTO MIXED CELLS %: 12 % (ref 0.1–24)
BILIRUB SERPL-MCNC: 0.3 MG/DL (ref 0.1–1)
BILIRUB UR QL STRIP: NEGATIVE
BUN BLD-MCNC: 15 MG/DL (ref 5–21)
BUN/CREAT SERPL: 19.7
CALCIUM SPEC-SCNC: 8.9 MG/DL (ref 8.4–10.4)
CHLORIDE SERPL-SCNC: 108 MMOL/L (ref 98–110)
CHOLEST SERPL-MCNC: 192 MG/DL (ref 130–200)
CLARITY UR: CLEAR
CO2 SERPL-SCNC: 24 MMOL/L (ref 24–31)
COLOR UR: YELLOW
CREAT BLD-MCNC: 0.76 MG/DL (ref 0.5–1.4)
ERYTHROCYTE [DISTWIDTH] IN BLOOD BY AUTOMATED COUNT: 15.7 % (ref 12–15)
GFR SERPL CREATININE-BSD FRML MDRD: 77 ML/MIN/1.73
GLOBULIN UR ELPH-MCNC: 3.6 GM/DL
GLUCOSE BLD-MCNC: 95 MG/DL (ref 70–100)
GLUCOSE UR STRIP-MCNC: NEGATIVE MG/DL
HCT VFR BLD AUTO: 37.9 % (ref 37–47)
HDLC SERPL-MCNC: 54 MG/DL
HGB BLD-MCNC: 12.4 G/DL (ref 12–16)
HGB UR QL STRIP.AUTO: NEGATIVE
KETONES UR QL STRIP: NEGATIVE
LDLC SERPL CALC-MCNC: 106 MG/DL (ref 0–99)
LDLC/HDLC SERPL: 1.97 {RATIO}
LEUKOCYTE ESTERASE UR QL STRIP.AUTO: NEGATIVE
LYMPHOCYTES # BLD AUTO: 1.7 10*3/MM3 (ref 0.8–7)
LYMPHOCYTES NFR BLD AUTO: 37.9 % (ref 15–45)
MAGNESIUM SERPL-MCNC: 2 MG/DL (ref 1.4–2.2)
MCH RBC QN AUTO: 30.2 PG (ref 28–32)
MCHC RBC AUTO-ENTMCNC: 32.7 G/DL (ref 33–36)
MCV RBC AUTO: 92.4 FL (ref 82–98)
NEUTROPHILS # BLD AUTO: 2.2 10*3/MM3 (ref 1.5–8.3)
NEUTROPHILS NFR BLD AUTO: 50.1 % (ref 39–78)
NITRITE UR QL STRIP: NEGATIVE
PH UR STRIP.AUTO: 6 [PH] (ref 5–8)
PLATELET # BLD AUTO: 175 10*3/MM3 (ref 130–400)
PMV BLD AUTO: 9.2 FL (ref 6–12)
POTASSIUM BLD-SCNC: 3.8 MMOL/L (ref 3.5–5.3)
PROT SERPL-MCNC: 7.2 G/DL (ref 6.3–8.7)
PROT UR QL STRIP: NEGATIVE
RBC # BLD AUTO: 4.1 10*6/MM3 (ref 4.2–5.4)
SODIUM BLD-SCNC: 140 MMOL/L (ref 135–145)
SP GR UR STRIP: >=1.03 (ref 1–1.03)
TRIGL SERPL-MCNC: 158 MG/DL (ref 0–149)
TSH SERPL DL<=0.05 MIU/L-ACNC: 0.63 MIU/ML (ref 0.47–4.68)
URATE SERPL-MCNC: 5.3 MG/DL (ref 2.7–7.5)
UROBILINOGEN UR QL STRIP: NORMAL
VLDLC SERPL-MCNC: 31.6 MG/DL
WBC NRBC COR # BLD: 4.4 10*3/MM3 (ref 4.8–10.8)

## 2018-03-29 PROCEDURE — 80053 COMPREHEN METABOLIC PANEL: CPT

## 2018-03-29 PROCEDURE — 82306 VITAMIN D 25 HYDROXY: CPT | Performed by: INTERNAL MEDICINE

## 2018-03-29 PROCEDURE — 36415 COLL VENOUS BLD VENIPUNCTURE: CPT

## 2018-03-29 PROCEDURE — 84443 ASSAY THYROID STIM HORMONE: CPT | Performed by: INTERNAL MEDICINE

## 2018-03-29 PROCEDURE — 84550 ASSAY OF BLOOD/URIC ACID: CPT

## 2018-03-29 PROCEDURE — 85025 COMPLETE CBC W/AUTO DIFF WBC: CPT

## 2018-03-29 PROCEDURE — 80061 LIPID PANEL: CPT

## 2018-03-29 PROCEDURE — 81003 URINALYSIS AUTO W/O SCOPE: CPT

## 2018-03-29 PROCEDURE — 83735 ASSAY OF MAGNESIUM: CPT | Performed by: INTERNAL MEDICINE

## 2018-04-02 ENCOUNTER — LAB REQUISITION (OUTPATIENT)
Dept: LAB | Facility: HOSPITAL | Age: 63
End: 2018-04-02

## 2018-04-02 ENCOUNTER — APPOINTMENT (OUTPATIENT)
Dept: WOUND CARE | Facility: HOSPITAL | Age: 63
End: 2018-04-02

## 2018-04-02 DIAGNOSIS — T81.31XA DISRUPTION OF EXTERNAL OPERATION (SURGICAL) WOUND, NOT ELSEWHERE CLASSIFIED, INITIAL ENCOUNTER: ICD-10-CM

## 2018-04-02 PROCEDURE — 87075 CULTR BACTERIA EXCEPT BLOOD: CPT | Performed by: SURGERY

## 2018-04-02 PROCEDURE — 87205 SMEAR GRAM STAIN: CPT | Performed by: SURGERY

## 2018-04-02 PROCEDURE — 87147 CULTURE TYPE IMMUNOLOGIC: CPT | Performed by: SURGERY

## 2018-04-02 PROCEDURE — 87176 TISSUE HOMOGENIZATION CULTR: CPT | Performed by: SURGERY

## 2018-04-02 PROCEDURE — 87070 CULTURE OTHR SPECIMN AEROBIC: CPT | Performed by: SURGERY

## 2018-04-02 PROCEDURE — 87185 SC STD ENZYME DETCJ PER NZM: CPT | Performed by: SURGERY

## 2018-04-02 PROCEDURE — 87186 SC STD MICRODIL/AGAR DIL: CPT | Performed by: SURGERY

## 2018-04-07 LAB
BACTERIA SPEC AEROBE CULT: ABNORMAL
BACTERIA SPEC ANAEROBE CULT: NORMAL
GRAM STN SPEC: ABNORMAL

## 2018-04-09 ENCOUNTER — APPOINTMENT (OUTPATIENT)
Dept: WOUND CARE | Facility: HOSPITAL | Age: 63
End: 2018-04-09

## 2018-04-10 DIAGNOSIS — M81.0 OSTEOPOROSIS, UNSPECIFIED OSTEOPOROSIS TYPE, UNSPECIFIED PATHOLOGICAL FRACTURE PRESENCE: ICD-10-CM

## 2018-04-16 ENCOUNTER — APPOINTMENT (OUTPATIENT)
Dept: WOUND CARE | Facility: HOSPITAL | Age: 63
End: 2018-04-16

## 2018-04-16 PROCEDURE — G0463 HOSPITAL OUTPT CLINIC VISIT: HCPCS

## 2018-04-23 ENCOUNTER — LAB REQUISITION (OUTPATIENT)
Dept: LAB | Facility: HOSPITAL | Age: 63
End: 2018-04-23

## 2018-04-23 DIAGNOSIS — M25.561 PAIN IN RIGHT KNEE: ICD-10-CM

## 2018-04-23 LAB
APPEARANCE FLD: ABNORMAL
COLOR FLD: ABNORMAL
EOSINOPHIL NFR FLD MANUAL: 1 %
LYMPHOCYTES NFR FLD MANUAL: 75 %
MONOCYTES NFR FLD: 4 %
NEUTROPHILS NFR FLD MANUAL: 20 %
RBC # FLD AUTO: ABNORMAL /MM3
UNCLASSIFIED CELLS, FLUID: 0 %
WBC # FLD: 400 /MM3

## 2018-04-23 PROCEDURE — 89051 BODY FLUID CELL COUNT: CPT

## 2018-04-23 PROCEDURE — 87015 SPECIMEN INFECT AGNT CONCNTJ: CPT

## 2018-04-23 PROCEDURE — 87102 FUNGUS ISOLATION CULTURE: CPT

## 2018-04-23 PROCEDURE — 87206 SMEAR FLUORESCENT/ACID STAI: CPT

## 2018-04-23 PROCEDURE — 89060 EXAM SYNOVIAL FLUID CRYSTALS: CPT

## 2018-04-23 PROCEDURE — 87205 SMEAR GRAM STAIN: CPT

## 2018-04-23 PROCEDURE — 87116 MYCOBACTERIA CULTURE: CPT

## 2018-04-23 PROCEDURE — 87070 CULTURE OTHR SPECIMN AEROBIC: CPT

## 2018-04-23 PROCEDURE — 87075 CULTR BACTERIA EXCEPT BLOOD: CPT

## 2018-04-25 ENCOUNTER — CLINICAL SUPPORT (OUTPATIENT)
Dept: OBSTETRICS AND GYNECOLOGY | Facility: CLINIC | Age: 63
End: 2018-04-25

## 2018-04-25 DIAGNOSIS — M80.00XD OSTEOPOROSIS WITH CURRENT PATHOLOGICAL FRACTURE WITH ROUTINE HEALING, UNSPECIFIED OSTEOPOROSIS TYPE, SUBSEQUENT ENCOUNTER: ICD-10-CM

## 2018-04-25 DIAGNOSIS — M81.0 OSTEOPOROSIS WITHOUT CURRENT PATHOLOGICAL FRACTURE, UNSPECIFIED OSTEOPOROSIS TYPE: Primary | ICD-10-CM

## 2018-04-25 LAB
CYTO UR: NORMAL
LAB AP CASE REPORT: NORMAL
Lab: NORMAL
PATH REPORT.FINAL DX SPEC: NORMAL
PATH REPORT.GROSS SPEC: NORMAL

## 2018-04-25 PROCEDURE — 96372 THER/PROPH/DIAG INJ SC/IM: CPT | Performed by: OBSTETRICS & GYNECOLOGY

## 2018-04-28 LAB
BACTERIA FLD CULT: NORMAL
BACTERIA SPEC ANAEROBE CULT: NORMAL
GRAM STN SPEC: NORMAL
GRAM STN SPEC: NORMAL

## 2018-05-01 ENCOUNTER — TELEPHONE (OUTPATIENT)
Dept: CARDIOLOGY | Age: 63
End: 2018-05-01

## 2018-05-02 ENCOUNTER — OFFICE VISIT (OUTPATIENT)
Dept: CARDIOLOGY | Age: 63
End: 2018-05-02
Payer: MEDICARE

## 2018-05-02 VITALS
HEIGHT: 66 IN | HEART RATE: 68 BPM | BODY MASS INDEX: 34.55 KG/M2 | WEIGHT: 215 LBS | SYSTOLIC BLOOD PRESSURE: 138 MMHG | DIASTOLIC BLOOD PRESSURE: 84 MMHG

## 2018-05-02 DIAGNOSIS — I10 ESSENTIAL HYPERTENSION: ICD-10-CM

## 2018-05-02 DIAGNOSIS — Z95.0 PACEMAKER: ICD-10-CM

## 2018-05-02 DIAGNOSIS — I25.10 CORONARY ARTERY DISEASE INVOLVING NATIVE CORONARY ARTERY OF NATIVE HEART WITHOUT ANGINA PECTORIS: Primary | ICD-10-CM

## 2018-05-02 DIAGNOSIS — M79.89 LEG SWELLING: ICD-10-CM

## 2018-05-02 DIAGNOSIS — E78.00 HYPERCHOLESTEROLEMIA: ICD-10-CM

## 2018-05-02 DIAGNOSIS — Z95.5 S/P CORONARY ARTERY STENT PLACEMENT: ICD-10-CM

## 2018-05-02 PROCEDURE — 93280 PM DEVICE PROGR EVAL DUAL: CPT | Performed by: NURSE PRACTITIONER

## 2018-05-02 PROCEDURE — G8427 DOCREV CUR MEDS BY ELIG CLIN: HCPCS | Performed by: NURSE PRACTITIONER

## 2018-05-02 PROCEDURE — G8417 CALC BMI ABV UP PARAM F/U: HCPCS | Performed by: NURSE PRACTITIONER

## 2018-05-02 PROCEDURE — G8598 ASA/ANTIPLAT THER USED: HCPCS | Performed by: NURSE PRACTITIONER

## 2018-05-02 PROCEDURE — 99214 OFFICE O/P EST MOD 30 MIN: CPT | Performed by: NURSE PRACTITIONER

## 2018-05-02 PROCEDURE — 3017F COLORECTAL CA SCREEN DOC REV: CPT | Performed by: NURSE PRACTITIONER

## 2018-05-02 PROCEDURE — 1036F TOBACCO NON-USER: CPT | Performed by: NURSE PRACTITIONER

## 2018-05-02 RX ORDER — CLOPIDOGREL BISULFATE 75 MG/1
75 TABLET ORAL DAILY
Qty: 30 TABLET | Refills: 5 | Status: ON HOLD | OUTPATIENT
Start: 2018-05-02 | End: 2018-12-10 | Stop reason: HOSPADM

## 2018-05-02 RX ORDER — NITROGLYCERIN 0.4 MG/1
0.4 TABLET SUBLINGUAL EVERY 5 MIN PRN
Qty: 25 TABLET | Refills: 3 | Status: SHIPPED | OUTPATIENT
Start: 2018-05-02 | End: 2019-07-23 | Stop reason: SDUPTHER

## 2018-05-02 RX ORDER — CARVEDILOL 12.5 MG/1
12.5 TABLET ORAL 2 TIMES DAILY
Qty: 180 TABLET | Refills: 3 | Status: SHIPPED | OUTPATIENT
Start: 2018-05-02 | End: 2018-07-27 | Stop reason: SDUPTHER

## 2018-05-21 DIAGNOSIS — I20.9 ANGINA PECTORIS (HCC): ICD-10-CM

## 2018-05-21 RX ORDER — ISOSORBIDE MONONITRATE 30 MG/1
TABLET, EXTENDED RELEASE ORAL
Qty: 30 TABLET | Refills: 0 | Status: SHIPPED | OUTPATIENT
Start: 2018-05-21 | End: 2018-05-22 | Stop reason: SDUPTHER

## 2018-05-22 DIAGNOSIS — I20.9 ANGINA PECTORIS (HCC): ICD-10-CM

## 2018-05-22 RX ORDER — ISOSORBIDE MONONITRATE 30 MG/1
TABLET, EXTENDED RELEASE ORAL
Qty: 90 TABLET | Refills: 3 | Status: ON HOLD | OUTPATIENT
Start: 2018-05-22 | End: 2018-09-11

## 2018-06-04 LAB
FUNGUS WND CULT: NORMAL
MYCOBACTERIUM SPEC CULT: NORMAL
NIGHT BLUE STAIN TISS: NORMAL
NIGHT BLUE STAIN TISS: NORMAL

## 2018-06-08 ENCOUNTER — TELEPHONE (OUTPATIENT)
Dept: CARDIOLOGY | Age: 63
End: 2018-06-08

## 2018-07-10 ENCOUNTER — TELEPHONE (OUTPATIENT)
Dept: CARDIOLOGY | Age: 63
End: 2018-07-10

## 2018-07-10 ENCOUNTER — HOSPITAL ENCOUNTER (OUTPATIENT)
Dept: PREADMISSION TESTING | Age: 63
Discharge: HOME | End: 2018-07-14
Payer: MEDICARE

## 2018-07-10 VITALS — WEIGHT: 205 LBS | BODY MASS INDEX: 32.95 KG/M2 | HEIGHT: 66 IN

## 2018-07-10 LAB
ANION GAP SERPL CALCULATED.3IONS-SCNC: 13 MMOL/L (ref 7–19)
APTT: 26.4 SEC (ref 26–36.2)
BASOPHILS ABSOLUTE: 0 K/UL (ref 0–0.2)
BASOPHILS RELATIVE PERCENT: 0.9 % (ref 0–1)
BUN BLDV-MCNC: 22 MG/DL (ref 8–23)
CALCIUM SERPL-MCNC: 9.1 MG/DL (ref 8.8–10.2)
CHLORIDE BLD-SCNC: 102 MMOL/L (ref 98–111)
CO2: 28 MMOL/L (ref 22–29)
CREAT SERPL-MCNC: 0.8 MG/DL (ref 0.5–0.9)
EOSINOPHILS ABSOLUTE: 0.1 K/UL (ref 0–0.6)
EOSINOPHILS RELATIVE PERCENT: 1.6 % (ref 0–5)
GFR NON-AFRICAN AMERICAN: >60
GLUCOSE BLD-MCNC: 81 MG/DL (ref 74–109)
HCT VFR BLD CALC: 40.2 % (ref 37–47)
HEMOGLOBIN: 12.7 G/DL (ref 12–16)
INR BLD: 1.08 (ref 0.88–1.18)
LYMPHOCYTES ABSOLUTE: 1.8 K/UL (ref 1.1–4.5)
LYMPHOCYTES RELATIVE PERCENT: 42.2 % (ref 20–40)
MCH RBC QN AUTO: 29.7 PG (ref 27–31)
MCHC RBC AUTO-ENTMCNC: 31.6 G/DL (ref 33–37)
MCV RBC AUTO: 94.1 FL (ref 81–99)
MONOCYTES ABSOLUTE: 0.4 K/UL (ref 0–0.9)
MONOCYTES RELATIVE PERCENT: 10 % (ref 0–10)
NEUTROPHILS ABSOLUTE: 1.9 K/UL (ref 1.5–7.5)
NEUTROPHILS RELATIVE PERCENT: 44.8 % (ref 50–65)
PDW BLD-RTO: 16.6 % (ref 11.5–14.5)
PLATELET # BLD: 214 K/UL (ref 130–400)
PMV BLD AUTO: 11.3 FL (ref 9.4–12.3)
POTASSIUM SERPL-SCNC: 4.6 MMOL/L (ref 3.5–5)
PROTHROMBIN TIME: 13.9 SEC (ref 12–14.6)
RBC # BLD: 4.27 M/UL (ref 4.2–5.4)
SODIUM BLD-SCNC: 143 MMOL/L (ref 136–145)
WBC # BLD: 4.3 K/UL (ref 4.8–10.8)

## 2018-07-10 PROCEDURE — 80048 BASIC METABOLIC PNL TOTAL CA: CPT

## 2018-07-10 PROCEDURE — 85610 PROTHROMBIN TIME: CPT

## 2018-07-10 PROCEDURE — 87070 CULTURE OTHR SPECIMN AEROBIC: CPT

## 2018-07-10 PROCEDURE — 85025 COMPLETE CBC W/AUTO DIFF WBC: CPT

## 2018-07-10 PROCEDURE — 93005 ELECTROCARDIOGRAM TRACING: CPT

## 2018-07-10 PROCEDURE — 85730 THROMBOPLASTIN TIME PARTIAL: CPT

## 2018-07-10 RX ORDER — LEFLUNOMIDE 20 MG/1
20 TABLET ORAL DAILY
Status: ON HOLD | COMMUNITY
End: 2018-09-12 | Stop reason: HOSPADM

## 2018-07-10 RX ORDER — SALSALATE 500 MG
500 TABLET ORAL 2 TIMES DAILY
Status: ON HOLD | COMMUNITY
End: 2018-12-07 | Stop reason: CLARIF

## 2018-07-10 RX ORDER — PREDNISONE 1 MG/1
2 TABLET ORAL DAILY
Status: ON HOLD | COMMUNITY
Start: 2021-03-27 | End: 2021-04-12 | Stop reason: HOSPADM

## 2018-07-10 NOTE — TELEPHONE ENCOUNTER
Clearance request received again from Dr Darryl Au wanting to hold Brilinta. Pt was changed to plavix and clearance med hold for plavix was sent 6/14. Spoke with Dr Latesha Bradley office and explained letter has been sent.

## 2018-07-11 LAB — MRSA CULTURE ONLY: NORMAL

## 2018-07-12 LAB
EKG P AXIS: 46 DEGREES
EKG P-R INTERVAL: 180 MS
EKG Q-T INTERVAL: 398 MS
EKG QRS DURATION: 88 MS
EKG QTC CALCULATION (BAZETT): 412 MS
EKG T AXIS: 69 DEGREES

## 2018-07-18 ENCOUNTER — TRANSCRIBE ORDERS (OUTPATIENT)
Dept: ADMINISTRATIVE | Facility: HOSPITAL | Age: 63
End: 2018-07-18

## 2018-07-18 ENCOUNTER — HOSPITAL ENCOUNTER (OUTPATIENT)
Dept: ULTRASOUND IMAGING | Facility: HOSPITAL | Age: 63
Discharge: HOME | End: 2018-07-18
Attending: INTERNAL MEDICINE | Admitting: INTERNAL MEDICINE

## 2018-07-18 DIAGNOSIS — M79.89 LEFT UPPER EXTREMITY SWELLING: Primary | ICD-10-CM

## 2018-07-18 DIAGNOSIS — M79.89 LEFT UPPER EXTREMITY SWELLING: ICD-10-CM

## 2018-07-18 PROCEDURE — 93971 EXTREMITY STUDY: CPT

## 2018-07-27 DIAGNOSIS — I10 ESSENTIAL HYPERTENSION: ICD-10-CM

## 2018-07-27 RX ORDER — CARVEDILOL 12.5 MG/1
12.5 TABLET ORAL 2 TIMES DAILY
Qty: 180 TABLET | Refills: 3 | Status: SHIPPED | OUTPATIENT
Start: 2018-07-27 | End: 2018-12-04 | Stop reason: SDUPTHER

## 2018-07-27 RX ORDER — LIDOCAINE HYDROCHLORIDE 10 MG/ML
INJECTION, SOLUTION INFILTRATION; PERINEURAL
Status: DISPENSED
Start: 2018-07-27 | End: 2018-07-27

## 2018-08-02 ENCOUNTER — TELEPHONE (OUTPATIENT)
Dept: CARDIOLOGY | Age: 63
End: 2018-08-02

## 2018-08-07 ENCOUNTER — TELEPHONE (OUTPATIENT)
Dept: OBSTETRICS AND GYNECOLOGY | Facility: CLINIC | Age: 63
End: 2018-08-07

## 2018-08-07 RX ORDER — VALACYCLOVIR HYDROCHLORIDE 500 MG/1
500 TABLET, FILM COATED ORAL DAILY
Qty: 30 TABLET | Refills: 3 | Status: SHIPPED | OUTPATIENT
Start: 2018-08-07 | End: 2019-05-06 | Stop reason: SDUPTHER

## 2018-08-07 NOTE — TELEPHONE ENCOUNTER
Pt requesting refill of Valtrex 500mg she takes daily. She said we filled it and it just now ran out of refills. She request it sent into [facility] she takes it for her fever blisters

## 2018-08-31 DIAGNOSIS — Z95.0 PACEMAKER: Primary | ICD-10-CM

## 2018-08-31 DIAGNOSIS — I49.5 SINUS NODE DYSFUNCTION (HCC): ICD-10-CM

## 2018-08-31 PROCEDURE — 93296 REM INTERROG EVL PM/IDS: CPT | Performed by: CLINICAL NURSE SPECIALIST

## 2018-08-31 PROCEDURE — 93294 REM INTERROG EVL PM/LDLS PM: CPT | Performed by: CLINICAL NURSE SPECIALIST

## 2018-09-04 ENCOUNTER — HOSPITAL ENCOUNTER (OUTPATIENT)
Dept: PREADMISSION TESTING | Age: 63
Discharge: HOME | End: 2018-09-08
Payer: MEDICARE

## 2018-09-04 VITALS — BODY MASS INDEX: 34.72 KG/M2 | HEIGHT: 66 IN | WEIGHT: 216 LBS

## 2018-09-04 LAB
ANION GAP SERPL CALCULATED.3IONS-SCNC: 13 MMOL/L (ref 7–19)
APTT: 26.3 SEC (ref 26–36.2)
BASOPHILS ABSOLUTE: 0.1 K/UL (ref 0–0.2)
BASOPHILS RELATIVE PERCENT: 1 % (ref 0–1)
BUN BLDV-MCNC: 18 MG/DL (ref 8–23)
CALCIUM SERPL-MCNC: 9.2 MG/DL (ref 8.8–10.2)
CHLORIDE BLD-SCNC: 105 MMOL/L (ref 98–111)
CO2: 26 MMOL/L (ref 22–29)
CREAT SERPL-MCNC: 0.6 MG/DL (ref 0.5–0.9)
EOSINOPHILS ABSOLUTE: 0.1 K/UL (ref 0–0.6)
EOSINOPHILS RELATIVE PERCENT: 1.8 % (ref 0–5)
GFR NON-AFRICAN AMERICAN: >60
GLUCOSE BLD-MCNC: 103 MG/DL (ref 74–109)
HCT VFR BLD CALC: 39.1 % (ref 37–47)
HEMOGLOBIN: 12 G/DL (ref 12–16)
INR BLD: 1.14 (ref 0.88–1.18)
LYMPHOCYTES ABSOLUTE: 2 K/UL (ref 1.1–4.5)
LYMPHOCYTES RELATIVE PERCENT: 40.8 % (ref 20–40)
MCH RBC QN AUTO: 28.5 PG (ref 27–31)
MCHC RBC AUTO-ENTMCNC: 30.7 G/DL (ref 33–37)
MCV RBC AUTO: 92.9 FL (ref 81–99)
MONOCYTES ABSOLUTE: 0.7 K/UL (ref 0–0.9)
MONOCYTES RELATIVE PERCENT: 13.1 % (ref 0–10)
NEUTROPHILS ABSOLUTE: 2.2 K/UL (ref 1.5–7.5)
NEUTROPHILS RELATIVE PERCENT: 43.3 % (ref 50–65)
PDW BLD-RTO: 16.7 % (ref 11.5–14.5)
PLATELET # BLD: 150 K/UL (ref 130–400)
PMV BLD AUTO: 11.6 FL (ref 9.4–12.3)
POTASSIUM SERPL-SCNC: 3.7 MMOL/L (ref 3.5–5)
PROTHROMBIN TIME: 14.5 SEC (ref 12–14.6)
RBC # BLD: 4.21 M/UL (ref 4.2–5.4)
SODIUM BLD-SCNC: 144 MMOL/L (ref 136–145)
WBC # BLD: 5 K/UL (ref 4.8–10.8)

## 2018-09-04 PROCEDURE — 85025 COMPLETE CBC W/AUTO DIFF WBC: CPT

## 2018-09-04 PROCEDURE — 80048 BASIC METABOLIC PNL TOTAL CA: CPT

## 2018-09-04 PROCEDURE — 85610 PROTHROMBIN TIME: CPT

## 2018-09-04 PROCEDURE — 87081 CULTURE SCREEN ONLY: CPT

## 2018-09-04 PROCEDURE — 85730 THROMBOPLASTIN TIME PARTIAL: CPT

## 2018-09-05 LAB — MRSA CULTURE ONLY: NORMAL

## 2018-09-11 ENCOUNTER — ANESTHESIA EVENT (OUTPATIENT)
Dept: OPERATING ROOM | Age: 63
DRG: 470 | End: 2018-09-11
Payer: MEDICARE

## 2018-09-11 ENCOUNTER — HOSPITAL ENCOUNTER (INPATIENT)
Age: 63
LOS: 1 days | Discharge: HOME HEALTH | DRG: 470 | End: 2018-09-12
Attending: ORTHOPAEDIC SURGERY | Admitting: ORTHOPAEDIC SURGERY
Payer: MEDICARE

## 2018-09-11 ENCOUNTER — ANESTHESIA (OUTPATIENT)
Dept: OPERATING ROOM | Age: 63
DRG: 470 | End: 2018-09-11
Payer: MEDICARE

## 2018-09-11 VITALS
DIASTOLIC BLOOD PRESSURE: 68 MMHG | TEMPERATURE: 95.6 F | SYSTOLIC BLOOD PRESSURE: 116 MMHG | OXYGEN SATURATION: 100 % | RESPIRATION RATE: 8 BRPM

## 2018-09-11 DIAGNOSIS — M17.11 PRIMARY OSTEOARTHRITIS OF RIGHT KNEE: Primary | ICD-10-CM

## 2018-09-11 LAB
ABO/RH: NORMAL
ANTIBODY SCREEN: NORMAL
GLUCOSE BLD-MCNC: 104 MG/DL (ref 70–99)
GLUCOSE BLD-MCNC: 225 MG/DL (ref 70–99)
PERFORMED ON: ABNORMAL
PERFORMED ON: ABNORMAL

## 2018-09-11 PROCEDURE — C1776 JOINT DEVICE (IMPLANTABLE): HCPCS | Performed by: ORTHOPAEDIC SURGERY

## 2018-09-11 PROCEDURE — 97165 OT EVAL LOW COMPLEX 30 MIN: CPT

## 2018-09-11 PROCEDURE — C1713 ANCHOR/SCREW BN/BN,TIS/BN: HCPCS | Performed by: ORTHOPAEDIC SURGERY

## 2018-09-11 PROCEDURE — 0SRC0J9 REPLACEMENT OF RIGHT KNEE JOINT WITH SYNTHETIC SUBSTITUTE, CEMENTED, OPEN APPROACH: ICD-10-PCS | Performed by: ORTHOPAEDIC SURGERY

## 2018-09-11 PROCEDURE — 3600000015 HC SURGERY LEVEL 5 ADDTL 15MIN: Performed by: ORTHOPAEDIC SURGERY

## 2018-09-11 PROCEDURE — 3700000001 HC ADD 15 MINUTES (ANESTHESIA): Performed by: ORTHOPAEDIC SURGERY

## 2018-09-11 PROCEDURE — 6370000000 HC RX 637 (ALT 250 FOR IP): Performed by: ORTHOPAEDIC SURGERY

## 2018-09-11 PROCEDURE — 6360000002 HC RX W HCPCS: Performed by: ORTHOPAEDIC SURGERY

## 2018-09-11 PROCEDURE — 2720000001 HC MISC SURG SUPPLY STERILE $51-500: Performed by: ORTHOPAEDIC SURGERY

## 2018-09-11 PROCEDURE — 94664 DEMO&/EVAL PT USE INHALER: CPT

## 2018-09-11 PROCEDURE — 86901 BLOOD TYPING SEROLOGIC RH(D): CPT

## 2018-09-11 PROCEDURE — 2709999900 HC NON-CHARGEABLE SUPPLY: Performed by: ORTHOPAEDIC SURGERY

## 2018-09-11 PROCEDURE — 7100000000 HC PACU RECOVERY - FIRST 15 MIN: Performed by: ORTHOPAEDIC SURGERY

## 2018-09-11 PROCEDURE — 6360000002 HC RX W HCPCS: Performed by: ANESTHESIOLOGY

## 2018-09-11 PROCEDURE — 6360000002 HC RX W HCPCS: Performed by: NURSE ANESTHETIST, CERTIFIED REGISTERED

## 2018-09-11 PROCEDURE — G8988 SELF CARE GOAL STATUS: HCPCS

## 2018-09-11 PROCEDURE — 2500000003 HC RX 250 WO HCPCS: Performed by: ORTHOPAEDIC SURGERY

## 2018-09-11 PROCEDURE — 2580000003 HC RX 258: Performed by: NURSE ANESTHETIST, CERTIFIED REGISTERED

## 2018-09-11 PROCEDURE — 86850 RBC ANTIBODY SCREEN: CPT

## 2018-09-11 PROCEDURE — 2580000003 HC RX 258: Performed by: ORTHOPAEDIC SURGERY

## 2018-09-11 PROCEDURE — 64447 NJX AA&/STRD FEMORAL NRV IMG: CPT | Performed by: NURSE ANESTHETIST, CERTIFIED REGISTERED

## 2018-09-11 PROCEDURE — 3E0T3BZ INTRODUCTION OF ANESTHETIC AGENT INTO PERIPHERAL NERVES AND PLEXI, PERCUTANEOUS APPROACH: ICD-10-PCS | Performed by: ANESTHESIOLOGY

## 2018-09-11 PROCEDURE — 97116 GAIT TRAINING THERAPY: CPT

## 2018-09-11 PROCEDURE — 2720000010 HC SURG SUPPLY STERILE: Performed by: ORTHOPAEDIC SURGERY

## 2018-09-11 PROCEDURE — 1210000000 HC MED SURG R&B

## 2018-09-11 PROCEDURE — 3700000000 HC ANESTHESIA ATTENDED CARE: Performed by: ORTHOPAEDIC SURGERY

## 2018-09-11 PROCEDURE — 97162 PT EVAL MOD COMPLEX 30 MIN: CPT

## 2018-09-11 PROCEDURE — 97535 SELF CARE MNGMENT TRAINING: CPT

## 2018-09-11 PROCEDURE — G8979 MOBILITY GOAL STATUS: HCPCS

## 2018-09-11 PROCEDURE — 6360000002 HC RX W HCPCS

## 2018-09-11 PROCEDURE — 36415 COLL VENOUS BLD VENIPUNCTURE: CPT

## 2018-09-11 PROCEDURE — 82948 REAGENT STRIP/BLOOD GLUCOSE: CPT

## 2018-09-11 PROCEDURE — 7100000001 HC PACU RECOVERY - ADDTL 15 MIN: Performed by: ORTHOPAEDIC SURGERY

## 2018-09-11 PROCEDURE — 86900 BLOOD TYPING SEROLOGIC ABO: CPT

## 2018-09-11 PROCEDURE — 3600000005 HC SURGERY LEVEL 5 BASE: Performed by: ORTHOPAEDIC SURGERY

## 2018-09-11 PROCEDURE — G8987 SELF CARE CURRENT STATUS: HCPCS

## 2018-09-11 PROCEDURE — G8978 MOBILITY CURRENT STATUS: HCPCS

## 2018-09-11 PROCEDURE — 2500000003 HC RX 250 WO HCPCS: Performed by: NURSE ANESTHETIST, CERTIFIED REGISTERED

## 2018-09-11 DEVICE — EXTENSION STEM L30MM DIA14MM KNEE TAPR CEM PERSONA: Type: IMPLANTABLE DEVICE | Site: KNEE | Status: FUNCTIONAL

## 2018-09-11 DEVICE — PSN TIB STM 5 DEG SZ E R: Type: IMPLANTABLE DEVICE | Site: KNEE | Status: FUNCTIONAL

## 2018-09-11 DEVICE — COMPONENT ARTC SURF PS 6-9 EF 12 MM RT TIB KNEE POLYETH: Type: IMPLANTABLE DEVICE | Site: KNEE | Status: FUNCTIONAL

## 2018-09-11 DEVICE — IMPLANTABLE DEVICE: Type: IMPLANTABLE DEVICE | Site: KNEE | Status: FUNCTIONAL

## 2018-09-11 DEVICE — CEMENT BONE 40GM HI VISC PALACOS R: Type: IMPLANTABLE DEVICE | Site: KNEE | Status: FUNCTIONAL

## 2018-09-11 DEVICE — COMPONENT PAT DIA29MM THK8MM KNEE POLY CEM CONVENTIONAL: Type: IMPLANTABLE DEVICE | Site: KNEE | Status: FUNCTIONAL

## 2018-09-11 RX ORDER — PREGABALIN 50 MG/1
50 CAPSULE ORAL DAILY
Status: DISCONTINUED | OUTPATIENT
Start: 2018-09-12 | End: 2018-09-12 | Stop reason: HOSPADM

## 2018-09-11 RX ORDER — ACETAMINOPHEN 500 MG
1000 TABLET ORAL
Status: COMPLETED | OUTPATIENT
Start: 2018-09-11 | End: 2018-09-11

## 2018-09-11 RX ORDER — TRANEXAMIC ACID 650 1/1
1950 TABLET ORAL ONCE
Status: COMPLETED | OUTPATIENT
Start: 2018-09-11 | End: 2018-09-11

## 2018-09-11 RX ORDER — GINSENG 100 MG
CAPSULE ORAL 3 TIMES DAILY
Status: DISCONTINUED | OUTPATIENT
Start: 2018-09-11 | End: 2018-09-11 | Stop reason: SDUPTHER

## 2018-09-11 RX ORDER — ISOSORBIDE MONONITRATE 30 MG/1
30 TABLET, EXTENDED RELEASE ORAL DAILY
Status: DISCONTINUED | OUTPATIENT
Start: 2018-09-11 | End: 2018-09-12 | Stop reason: HOSPADM

## 2018-09-11 RX ORDER — LIDOCAINE HYDROCHLORIDE 10 MG/ML
1 INJECTION, SOLUTION EPIDURAL; INFILTRATION; INTRACAUDAL; PERINEURAL
Status: DISCONTINUED | OUTPATIENT
Start: 2018-09-11 | End: 2018-09-11 | Stop reason: HOSPADM

## 2018-09-11 RX ORDER — FENTANYL CITRATE 50 UG/ML
25 INJECTION, SOLUTION INTRAMUSCULAR; INTRAVENOUS
Status: DISCONTINUED | OUTPATIENT
Start: 2018-09-11 | End: 2018-09-11 | Stop reason: HOSPADM

## 2018-09-11 RX ORDER — SODIUM CHLORIDE, SODIUM LACTATE, POTASSIUM CHLORIDE, CALCIUM CHLORIDE 600; 310; 30; 20 MG/100ML; MG/100ML; MG/100ML; MG/100ML
INJECTION, SOLUTION INTRAVENOUS CONTINUOUS
Status: DISCONTINUED | OUTPATIENT
Start: 2018-09-11 | End: 2018-09-12 | Stop reason: HOSPADM

## 2018-09-11 RX ORDER — METOCLOPRAMIDE HYDROCHLORIDE 5 MG/ML
10 INJECTION INTRAMUSCULAR; INTRAVENOUS
Status: DISCONTINUED | OUTPATIENT
Start: 2018-09-11 | End: 2018-09-11 | Stop reason: HOSPADM

## 2018-09-11 RX ORDER — SCOLOPAMINE TRANSDERMAL SYSTEM 1 MG/1
1 PATCH, EXTENDED RELEASE TRANSDERMAL ONCE
Status: DISCONTINUED | OUTPATIENT
Start: 2018-09-11 | End: 2018-09-12 | Stop reason: HOSPADM

## 2018-09-11 RX ORDER — OXYCODONE HCL 10 MG/1
10 TABLET, FILM COATED, EXTENDED RELEASE ORAL
Status: COMPLETED | OUTPATIENT
Start: 2018-09-11 | End: 2018-09-11

## 2018-09-11 RX ORDER — HYDROMORPHONE HCL IN 0.9% NACL 0.5 MG/ML
0.5 SYRINGE (ML) INTRAVENOUS EVERY 5 MIN PRN
Status: DISCONTINUED | OUTPATIENT
Start: 2018-09-11 | End: 2018-09-11 | Stop reason: HOSPADM

## 2018-09-11 RX ORDER — DIPHENHYDRAMINE HYDROCHLORIDE 50 MG/ML
12.5 INJECTION INTRAMUSCULAR; INTRAVENOUS
Status: DISCONTINUED | OUTPATIENT
Start: 2018-09-11 | End: 2018-09-11 | Stop reason: HOSPADM

## 2018-09-11 RX ORDER — MORPHINE SULFATE/0.9% NACL/PF 1 MG/ML
4 SYRINGE (ML) INJECTION
Status: DISCONTINUED | OUTPATIENT
Start: 2018-09-11 | End: 2018-09-12 | Stop reason: HOSPADM

## 2018-09-11 RX ORDER — LIDOCAINE HYDROCHLORIDE 10 MG/ML
INJECTION, SOLUTION EPIDURAL; INFILTRATION; INTRACAUDAL; PERINEURAL PRN
Status: DISCONTINUED | OUTPATIENT
Start: 2018-09-11 | End: 2018-09-11 | Stop reason: SDUPTHER

## 2018-09-11 RX ORDER — MORPHINE SULFATE/0.9% NACL/PF 1 MG/ML
2 SYRINGE (ML) INJECTION
Status: DISCONTINUED | OUTPATIENT
Start: 2018-09-11 | End: 2018-09-12 | Stop reason: HOSPADM

## 2018-09-11 RX ORDER — CARVEDILOL 12.5 MG/1
12.5 TABLET ORAL 2 TIMES DAILY
Status: DISCONTINUED | OUTPATIENT
Start: 2018-09-11 | End: 2018-09-12 | Stop reason: HOSPADM

## 2018-09-11 RX ORDER — 0.9 % SODIUM CHLORIDE 0.9 %
500 INTRAVENOUS SOLUTION INTRAVENOUS PRN
Status: DISCONTINUED | OUTPATIENT
Start: 2018-09-11 | End: 2018-09-12 | Stop reason: HOSPADM

## 2018-09-11 RX ORDER — ERGOCALCIFEROL 1.25 MG/1
50000 CAPSULE ORAL WEEKLY
Status: DISCONTINUED | OUTPATIENT
Start: 2018-09-11 | End: 2018-09-12 | Stop reason: HOSPADM

## 2018-09-11 RX ORDER — KETAMINE HYDROCHLORIDE 100 MG/ML
INJECTION, SOLUTION INTRAMUSCULAR; INTRAVENOUS PRN
Status: DISCONTINUED | OUTPATIENT
Start: 2018-09-11 | End: 2018-09-11 | Stop reason: SDUPTHER

## 2018-09-11 RX ORDER — EPHEDRINE SULFATE 50 MG/ML
INJECTION, SOLUTION INTRAVENOUS PRN
Status: DISCONTINUED | OUTPATIENT
Start: 2018-09-11 | End: 2018-09-11 | Stop reason: SDUPTHER

## 2018-09-11 RX ORDER — FUROSEMIDE 40 MG/1
40 TABLET ORAL DAILY
Status: DISCONTINUED | OUTPATIENT
Start: 2018-09-11 | End: 2018-09-12 | Stop reason: HOSPADM

## 2018-09-11 RX ORDER — ONDANSETRON 2 MG/ML
INJECTION INTRAMUSCULAR; INTRAVENOUS PRN
Status: DISCONTINUED | OUTPATIENT
Start: 2018-09-11 | End: 2018-09-11 | Stop reason: SDUPTHER

## 2018-09-11 RX ORDER — SODIUM CHLORIDE 0.9 % (FLUSH) 0.9 %
10 SYRINGE (ML) INJECTION EVERY 12 HOURS SCHEDULED
Status: DISCONTINUED | OUTPATIENT
Start: 2018-09-11 | End: 2018-09-11 | Stop reason: HOSPADM

## 2018-09-11 RX ORDER — HYDROMORPHONE HCL IN 0.9% NACL 0.5 MG/ML
0.25 SYRINGE (ML) INTRAVENOUS EVERY 5 MIN PRN
Status: DISCONTINUED | OUTPATIENT
Start: 2018-09-11 | End: 2018-09-11 | Stop reason: HOSPADM

## 2018-09-11 RX ORDER — ACETAMINOPHEN 325 MG/1
650 TABLET ORAL EVERY 4 HOURS PRN
Status: DISCONTINUED | OUTPATIENT
Start: 2018-09-11 | End: 2018-09-12 | Stop reason: HOSPADM

## 2018-09-11 RX ORDER — PREDNISONE 1 MG/1
2 TABLET ORAL DAILY
Status: DISCONTINUED | OUTPATIENT
Start: 2018-09-12 | End: 2018-09-12 | Stop reason: HOSPADM

## 2018-09-11 RX ORDER — HYDRALAZINE HYDROCHLORIDE 20 MG/ML
5 INJECTION INTRAMUSCULAR; INTRAVENOUS EVERY 10 MIN PRN
Status: DISCONTINUED | OUTPATIENT
Start: 2018-09-11 | End: 2018-09-11 | Stop reason: HOSPADM

## 2018-09-11 RX ORDER — MIDAZOLAM HYDROCHLORIDE 1 MG/ML
INJECTION INTRAMUSCULAR; INTRAVENOUS
Status: COMPLETED
Start: 2018-09-11 | End: 2018-09-11

## 2018-09-11 RX ORDER — SPIRONOLACTONE 25 MG/1
25 TABLET ORAL DAILY
Status: DISCONTINUED | OUTPATIENT
Start: 2018-09-11 | End: 2018-09-12 | Stop reason: HOSPADM

## 2018-09-11 RX ORDER — DEXAMETHASONE SODIUM PHOSPHATE 10 MG/ML
INJECTION INTRAMUSCULAR; INTRAVENOUS PRN
Status: DISCONTINUED | OUTPATIENT
Start: 2018-09-11 | End: 2018-09-11 | Stop reason: SDUPTHER

## 2018-09-11 RX ORDER — FENTANYL CITRATE 50 UG/ML
50 INJECTION, SOLUTION INTRAMUSCULAR; INTRAVENOUS
Status: DISCONTINUED | OUTPATIENT
Start: 2018-09-11 | End: 2018-09-11 | Stop reason: HOSPADM

## 2018-09-11 RX ORDER — ISOSORBIDE MONONITRATE 30 MG/1
30 TABLET, EXTENDED RELEASE ORAL DAILY
COMMUNITY
End: 2019-04-19 | Stop reason: SDUPTHER

## 2018-09-11 RX ORDER — SODIUM CHLORIDE 9 MG/ML
INJECTION, SOLUTION INTRAVENOUS CONTINUOUS PRN
Status: DISCONTINUED | OUTPATIENT
Start: 2018-09-11 | End: 2018-09-11 | Stop reason: SDUPTHER

## 2018-09-11 RX ORDER — MORPHINE SULFATE/0.9% NACL/PF 1 MG/ML
2 SYRINGE (ML) INJECTION EVERY 5 MIN PRN
Status: DISCONTINUED | OUTPATIENT
Start: 2018-09-11 | End: 2018-09-11 | Stop reason: HOSPADM

## 2018-09-11 RX ORDER — OXYCODONE HYDROCHLORIDE AND ACETAMINOPHEN 5; 325 MG/1; MG/1
2 TABLET ORAL EVERY 4 HOURS PRN
Status: DISCONTINUED | OUTPATIENT
Start: 2018-09-11 | End: 2018-09-12 | Stop reason: HOSPADM

## 2018-09-11 RX ORDER — SODIUM CHLORIDE 0.9 % (FLUSH) 0.9 %
10 SYRINGE (ML) INJECTION EVERY 12 HOURS SCHEDULED
Status: DISCONTINUED | OUTPATIENT
Start: 2018-09-11 | End: 2018-09-12 | Stop reason: HOSPADM

## 2018-09-11 RX ORDER — MEPERIDINE HYDROCHLORIDE 50 MG/ML
12.5 INJECTION INTRAMUSCULAR; INTRAVENOUS; SUBCUTANEOUS EVERY 5 MIN PRN
Status: DISCONTINUED | OUTPATIENT
Start: 2018-09-11 | End: 2018-09-11 | Stop reason: HOSPADM

## 2018-09-11 RX ORDER — SODIUM CHLORIDE 0.9 % (FLUSH) 0.9 %
10 SYRINGE (ML) INJECTION PRN
Status: DISCONTINUED | OUTPATIENT
Start: 2018-09-11 | End: 2018-09-11 | Stop reason: HOSPADM

## 2018-09-11 RX ORDER — MORPHINE SULFATE/0.9% NACL/PF 1 MG/ML
4 SYRINGE (ML) INJECTION EVERY 5 MIN PRN
Status: DISCONTINUED | OUTPATIENT
Start: 2018-09-11 | End: 2018-09-11 | Stop reason: HOSPADM

## 2018-09-11 RX ORDER — NITROGLYCERIN 0.4 MG/1
0.4 TABLET SUBLINGUAL EVERY 5 MIN PRN
Status: DISCONTINUED | OUTPATIENT
Start: 2018-09-11 | End: 2018-09-12 | Stop reason: HOSPADM

## 2018-09-11 RX ORDER — ROCURONIUM BROMIDE 10 MG/ML
INJECTION, SOLUTION INTRAVENOUS PRN
Status: DISCONTINUED | OUTPATIENT
Start: 2018-09-11 | End: 2018-09-11 | Stop reason: SDUPTHER

## 2018-09-11 RX ORDER — ROPIVACAINE HYDROCHLORIDE 5 MG/ML
INJECTION, SOLUTION EPIDURAL; INFILTRATION; PERINEURAL PRN
Status: DISCONTINUED | OUTPATIENT
Start: 2018-09-11 | End: 2018-09-11 | Stop reason: SDUPTHER

## 2018-09-11 RX ORDER — FLUTICASONE PROPIONATE 50 MCG
2 SPRAY, SUSPENSION (ML) NASAL DAILY
Status: DISCONTINUED | OUTPATIENT
Start: 2018-09-11 | End: 2018-09-12 | Stop reason: HOSPADM

## 2018-09-11 RX ORDER — FENTANYL CITRATE 50 UG/ML
INJECTION, SOLUTION INTRAMUSCULAR; INTRAVENOUS PRN
Status: DISCONTINUED | OUTPATIENT
Start: 2018-09-11 | End: 2018-09-11 | Stop reason: SDUPTHER

## 2018-09-11 RX ORDER — SODIUM CHLORIDE 0.9 % (FLUSH) 0.9 %
10 SYRINGE (ML) INJECTION PRN
Status: DISCONTINUED | OUTPATIENT
Start: 2018-09-11 | End: 2018-09-12 | Stop reason: HOSPADM

## 2018-09-11 RX ORDER — LABETALOL HYDROCHLORIDE 5 MG/ML
5 INJECTION, SOLUTION INTRAVENOUS EVERY 10 MIN PRN
Status: DISCONTINUED | OUTPATIENT
Start: 2018-09-11 | End: 2018-09-11 | Stop reason: HOSPADM

## 2018-09-11 RX ORDER — PROPOFOL 10 MG/ML
INJECTION, EMULSION INTRAVENOUS PRN
Status: DISCONTINUED | OUTPATIENT
Start: 2018-09-11 | End: 2018-09-11 | Stop reason: SDUPTHER

## 2018-09-11 RX ORDER — ENALAPRILAT 2.5 MG/2ML
1.25 INJECTION INTRAVENOUS
Status: DISCONTINUED | OUTPATIENT
Start: 2018-09-11 | End: 2018-09-11 | Stop reason: HOSPADM

## 2018-09-11 RX ORDER — GINSENG 100 MG
CAPSULE ORAL 3 TIMES DAILY
Status: DISCONTINUED | OUTPATIENT
Start: 2018-09-11 | End: 2018-09-12 | Stop reason: HOSPADM

## 2018-09-11 RX ORDER — DOCUSATE SODIUM 100 MG/1
100 CAPSULE, LIQUID FILLED ORAL 2 TIMES DAILY
Status: DISCONTINUED | OUTPATIENT
Start: 2018-09-11 | End: 2018-09-12 | Stop reason: HOSPADM

## 2018-09-11 RX ORDER — BISACODYL 10 MG
10 SUPPOSITORY, RECTAL RECTAL DAILY PRN
Status: DISCONTINUED | OUTPATIENT
Start: 2018-09-11 | End: 2018-09-12 | Stop reason: HOSPADM

## 2018-09-11 RX ORDER — MIDAZOLAM HYDROCHLORIDE 1 MG/ML
2 INJECTION INTRAMUSCULAR; INTRAVENOUS
Status: DISCONTINUED | OUTPATIENT
Start: 2018-09-11 | End: 2018-09-11 | Stop reason: HOSPADM

## 2018-09-11 RX ORDER — DEXAMETHASONE SODIUM PHOSPHATE 10 MG/ML
10 INJECTION INTRAMUSCULAR; INTRAVENOUS ONCE
Status: DISCONTINUED | OUTPATIENT
Start: 2018-09-11 | End: 2018-09-11 | Stop reason: HOSPADM

## 2018-09-11 RX ORDER — PROMETHAZINE HYDROCHLORIDE 25 MG/ML
6.25 INJECTION, SOLUTION INTRAMUSCULAR; INTRAVENOUS
Status: DISCONTINUED | OUTPATIENT
Start: 2018-09-11 | End: 2018-09-11 | Stop reason: HOSPADM

## 2018-09-11 RX ORDER — ONDANSETRON 2 MG/ML
4 INJECTION INTRAMUSCULAR; INTRAVENOUS EVERY 6 HOURS PRN
Status: DISCONTINUED | OUTPATIENT
Start: 2018-09-11 | End: 2018-09-12 | Stop reason: HOSPADM

## 2018-09-11 RX ORDER — FOLIC ACID 1 MG/1
1 TABLET ORAL DAILY
Status: DISCONTINUED | OUTPATIENT
Start: 2018-09-11 | End: 2018-09-12 | Stop reason: HOSPADM

## 2018-09-11 RX ORDER — OXYCODONE HYDROCHLORIDE AND ACETAMINOPHEN 5; 325 MG/1; MG/1
1 TABLET ORAL EVERY 4 HOURS PRN
Status: DISCONTINUED | OUTPATIENT
Start: 2018-09-11 | End: 2018-09-12 | Stop reason: HOSPADM

## 2018-09-11 RX ORDER — LEVOTHYROXINE SODIUM 0.07 MG/1
75 TABLET ORAL DAILY
Status: DISCONTINUED | OUTPATIENT
Start: 2018-09-12 | End: 2018-09-12 | Stop reason: HOSPADM

## 2018-09-11 RX ORDER — ROPIVACAINE HYDROCHLORIDE 2 MG/ML
INJECTION, SOLUTION EPIDURAL; INFILTRATION; PERINEURAL PRN
Status: DISCONTINUED | OUTPATIENT
Start: 2018-09-11 | End: 2018-09-11 | Stop reason: HOSPADM

## 2018-09-11 RX ADMIN — EPHEDRINE SULFATE 5 MG: 50 INJECTION, SOLUTION INTRAMUSCULAR; INTRAVENOUS; SUBCUTANEOUS at 10:09

## 2018-09-11 RX ADMIN — RIVAROXABAN 10 MG: 10 TABLET, FILM COATED ORAL at 23:00

## 2018-09-11 RX ADMIN — Medication 50 MG: at 09:11

## 2018-09-11 RX ADMIN — OXYCODONE AND ACETAMINOPHEN 1 TABLET: 5; 325 TABLET ORAL at 17:22

## 2018-09-11 RX ADMIN — FLUTICASONE PROPIONATE 2 SPRAY: 50 SPRAY, METERED NASAL at 14:47

## 2018-09-11 RX ADMIN — SODIUM CHLORIDE, SODIUM LACTATE, POTASSIUM CHLORIDE, AND CALCIUM CHLORIDE: 600; 310; 30; 20 INJECTION, SOLUTION INTRAVENOUS at 08:04

## 2018-09-11 RX ADMIN — DOCUSATE SODIUM 100 MG: 100 CAPSULE, LIQUID FILLED ORAL at 21:20

## 2018-09-11 RX ADMIN — Medication 2 G: at 09:03

## 2018-09-11 RX ADMIN — DEXAMETHASONE SODIUM PHOSPHATE 10 MG: 10 INJECTION INTRAMUSCULAR; INTRAVENOUS at 09:04

## 2018-09-11 RX ADMIN — PROPOFOL 140 MG: 10 INJECTION, EMULSION INTRAVENOUS at 08:54

## 2018-09-11 RX ADMIN — SODIUM CHLORIDE, SODIUM LACTATE, POTASSIUM CHLORIDE, AND CALCIUM CHLORIDE: 600; 310; 30; 20 INJECTION, SOLUTION INTRAVENOUS at 09:19

## 2018-09-11 RX ADMIN — SODIUM CHLORIDE: 9 INJECTION, SOLUTION INTRAVENOUS at 10:38

## 2018-09-11 RX ADMIN — ACETAMINOPHEN 1000 MG: 500 TABLET, FILM COATED ORAL at 08:04

## 2018-09-11 RX ADMIN — TRANEXAMIC ACID 1950 MG: 650 TABLET ORAL at 08:04

## 2018-09-11 RX ADMIN — ONDANSETRON HYDROCHLORIDE 4 MG: 2 INJECTION, SOLUTION INTRAMUSCULAR; INTRAVENOUS at 10:03

## 2018-09-11 RX ADMIN — ROCURONIUM BROMIDE 50 MG: 10 INJECTION INTRAVENOUS at 08:55

## 2018-09-11 RX ADMIN — LIDOCAINE HYDROCHLORIDE 50 MG: 10 INJECTION, SOLUTION EPIDURAL; INFILTRATION; INTRACAUDAL; PERINEURAL at 08:54

## 2018-09-11 RX ADMIN — CARVEDILOL 12.5 MG: 12.5 TABLET, FILM COATED ORAL at 17:18

## 2018-09-11 RX ADMIN — MIDAZOLAM HYDROCHLORIDE 2 MG: 2 INJECTION, SOLUTION INTRAMUSCULAR; INTRAVENOUS at 08:40

## 2018-09-11 RX ADMIN — SODIUM CHLORIDE, POTASSIUM CHLORIDE, SODIUM LACTATE AND CALCIUM CHLORIDE: 600; 310; 30; 20 INJECTION, SOLUTION INTRAVENOUS at 13:51

## 2018-09-11 RX ADMIN — EPHEDRINE SULFATE 5 MG: 50 INJECTION, SOLUTION INTRAMUSCULAR; INTRAVENOUS; SUBCUTANEOUS at 10:35

## 2018-09-11 RX ADMIN — OXYCODONE HYDROCHLORIDE 10 MG: 10 TABLET, FILM COATED, EXTENDED RELEASE ORAL at 08:04

## 2018-09-11 RX ADMIN — SUGAMMADEX 200 MG: 100 INJECTION, SOLUTION INTRAVENOUS at 10:55

## 2018-09-11 RX ADMIN — HYDROMORPHONE HYDROCHLORIDE 0.5 MG: 1 INJECTION, SOLUTION INTRAMUSCULAR; INTRAVENOUS; SUBCUTANEOUS at 09:43

## 2018-09-11 RX ADMIN — SPIRONOLACTONE 25 MG: 25 TABLET ORAL at 14:02

## 2018-09-11 RX ADMIN — Medication 2 G: at 17:16

## 2018-09-11 RX ADMIN — HYDROMORPHONE HYDROCHLORIDE 0.5 MG: 1 INJECTION, SOLUTION INTRAMUSCULAR; INTRAVENOUS; SUBCUTANEOUS at 09:11

## 2018-09-11 RX ADMIN — FENTANYL CITRATE 100 MCG: 50 INJECTION INTRAMUSCULAR; INTRAVENOUS at 08:53

## 2018-09-11 RX ADMIN — EPHEDRINE SULFATE 10 MG: 50 INJECTION, SOLUTION INTRAMUSCULAR; INTRAVENOUS; SUBCUTANEOUS at 09:07

## 2018-09-11 RX ADMIN — ROPIVACAINE HYDROCHLORIDE 20 ML: 5 INJECTION, SOLUTION EPIDURAL; INFILTRATION; PERINEURAL at 08:41

## 2018-09-11 RX ADMIN — SODIUM CHLORIDE, POTASSIUM CHLORIDE, SODIUM LACTATE AND CALCIUM CHLORIDE: 600; 310; 30; 20 INJECTION, SOLUTION INTRAVENOUS at 21:23

## 2018-09-11 RX ADMIN — OXYCODONE AND ACETAMINOPHEN 2 TABLET: 5; 325 TABLET ORAL at 21:20

## 2018-09-11 ASSESSMENT — ENCOUNTER SYMPTOMS
DIARRHEA: 0
NAUSEA: 0
COUGH: 0
VOMITING: 0
DOUBLE VISION: 0
BLURRED VISION: 0
SHORTNESS OF BREATH: 0

## 2018-09-11 ASSESSMENT — PAIN SCALES - GENERAL
PAINLEVEL_OUTOF10: 2
PAINLEVEL_OUTOF10: 4
PAINLEVEL_OUTOF10: 1
PAINLEVEL_OUTOF10: 7
PAINLEVEL_OUTOF10: 3

## 2018-09-11 ASSESSMENT — LIFESTYLE VARIABLES: SMOKING_STATUS: 0

## 2018-09-11 NOTE — ANESTHESIA PRE PROCEDURE
Substance Use Topics    Smoking status: Never Smoker    Smokeless tobacco: Never Used    Alcohol use No                                Counseling given: Not Answered      Vital Signs (Current):   Vitals:    09/11/18 0737   BP: 125/72   Pulse: 60   Resp: 18   Temp: 96.9 °F (36.1 °C)   TempSrc: Temporal   SpO2: 96%   Weight: 216 lb (98 kg)   Height: 5' 6\" (1.676 m)                                              BP Readings from Last 3 Encounters:   09/11/18 125/72   05/02/18 138/84   02/02/18 123/69       NPO Status: Time of last liquid consumption: 0000                        Time of last solid consumption: 0000                        Date of last liquid consumption: 09/10/18                        Date of last solid food consumption: 09/10/18    BMI:   Wt Readings from Last 3 Encounters:   09/11/18 216 lb (98 kg)   09/04/18 216 lb (98 kg)   07/10/18 205 lb (93 kg)     Body mass index is 34.86 kg/m². CBC:   Lab Results   Component Value Date    WBC 5.0 09/04/2018    RBC 4.21 09/04/2018    HGB 12.0 09/04/2018    HCT 39.1 09/04/2018    MCV 92.9 09/04/2018    RDW 16.7 09/04/2018     09/04/2018       CMP:   Lab Results   Component Value Date     09/04/2018    K 3.7 09/04/2018    K 3.7 01/25/2018     09/04/2018    CO2 26 09/04/2018    BUN 18 09/04/2018    CREATININE 0.6 09/04/2018    LABGLOM >60 09/04/2018    GLUCOSE 103 09/04/2018    PROT 6.6 02/19/2015    CALCIUM 9.2 09/04/2018    ALKPHOS 101 02/19/2015    AST 24 02/19/2015    ALT 14 02/19/2015       POC Tests: No results for input(s): POCGLU, POCNA, POCK, POCCL, POCBUN, POCHEMO, POCHCT in the last 72 hours.     Coags:   Lab Results   Component Value Date    PROTIME 14.5 09/04/2018    INR 1.14 09/04/2018    APTT 26.3 09/04/2018       HCG (If Applicable): No results found for: PREGTESTUR, PREGSERUM, HCG, HCGQUANT     ABGs: No results found for: PHART, PO2ART, JIO7EAB, FZC6PRV, BEART, U6JKEIJA     Type & Screen (If Applicable):  No results found

## 2018-09-11 NOTE — OP NOTE
around the proximal thigh. The lower extremity was prepped with chlorhexidene/alcohol and draped sterilely using ioband barriers. A time out was performed. The leg was exsanguinated with an ace wrap and the tourniquet inflated to 300 mm Hg. An anterior knee incision was made and fasciocutaneous flaps were developed. A mini midvastus approach was made and the patella subluxed. The prepatellar fat pad, ACL, and the anterior horns of the menisci were excised. The AP femoral axis was marked with electrocautery. A starter drill was placed down the femoral shaft 1 cm anterior to the PCL origin. An alignment eagle was placed down the femoral shaft. The distal femoral cutting guide, set at 5 degrees of valgus  was then placed over the alignment eagle, and pinned perpendicular to the AP axis. The cutting block was then set to remove an extra 1 mm of distal femur and the distal cut made. Hohmans and a PCL retractor were placed around the tibia. The external alignment guide set to cut 10 mm off the intact side at 3° degrees posterior slope was pinned in place. I stepped back and verified that the guide followed the anterior cortex of the tibia to the ankle. The tibial cut was made. The tibial fragment and osteophytes were removed. Posterior meniscal horns were resected. The extension gap was satisfactory. The epicondylar axis was marked with electrocautery. Femoral trials were laid on the distal femur to estimate the appropriate size. The femoral sizer, with posterior paddles set at 3 degrees of external rotation, and an anterior stylus was placed. The sizer reading matched the size predicted by the trial.   The pinholes were drilled for the 4 in 1 femoral cutting block. This block was impacted on the distal femur and sat flush with the proper rotation relative to the AP and epicondylar axes. A drill was advanced through the anterior slot and did not notch. The remaining femoral cuts were made. Femoral osteophytes were removed with a rongeur. The flexion gap was satisfactory. Posterior femoral osteophytes were removed with a 3/4 inch curved osteotome and rongeur. The appropriate tibial post punch guide covered the tibia without overhang and sat flush. It was lined up with the median third of the tibial tubercle. The tibia was reamed, then the keel impacted in place. The femoral trial was impacted onto the femur. A 10 mm thick, posterior stabilized tibial liner was snapped in place and ROM and stability were checked. The knee had full ROM and symmetric varus/valgus stability in flexion and extension after    *release of  NO RELEASES  * no extra resection was needed   *the appropriate size tibial liner was placed (see above)    Note: Stability to varus/valgus stress(mm):  Extension ( 2   ,   2 )  Flexion (  2  ,  1  ) VERY SOFT BONE WITH BMI OF 35 SO USED CPS STUBBY POST  The tibial trial rotation, which lined up with the medial third of the tibial tubercle, was marked on the proximal tibia with electrocautery. The maximum patella thickness was 22 mm. The patella as resected with an oscillating saw and consistent thickness verified with caliper. The trial patella was placed and the overall thickness was restored. The lateral patellar facet was resected with a saw. The patella tracked normally. A femoral  bone plug placed, the femoral lug holes drilled and the trials were removed. The surfaces were rinsed with pulse lavage and dried. Two batches of cement  were mixed. Appropriate sized implants were cemented in place, a trial tibial liner placed, and the knee held in full extension until cement hardened. Fifty milliliters of Betadine was poured into the joint and suctioned out once the cement was hardened. The capsule was injected with a Ropivacaine orthopedic cocktail. Excess cement was removed, and the actual tibial liner was snapped in place.     The tourniquet was released

## 2018-09-11 NOTE — CARE COORDINATION
Shawanda Rdz RN  P# [phone]    Patient would like dme items to be delivered to room #540. Please call if you have any questions. Patient Information   Patient Name  Himanshu Garcia ([id number]) Sex  Female   1955   Room Bed   0540 540-01   Patient Ethnicity & Race   Ethnic Group Patient Race   Non-/Non  White   Patient Demographics   Address  [address] [place] [address] Phone  [phone] [facility])  [phone] [facility] E-mail Address  [email or web address]   PCP and [facility]   Amina Zheng MD [address] [place], [place] [address]way 13 [facility]   Documents on File      Status Date Received Description   Documents for the Patient   HIPAA Notice of Privacy Signed () 11    Physician Office Consent for Treatment Not Received     Advance Directives and Living Will Not Received     Power of  Not Received     HIM DIANA Authorization  12 Sent EPIC records to [facility] [place] [address]   Financial Assistance Program Applications Not Received     [employer] Adult Proxy Access Form Not Received     [employer] Child Proxy Access Form Not Received     Photo ID Received () 13 exp 16   HIM DIANA Authorization  06/10/13 records from 13 heart attack   Outside Record   13 [facility]   Consultation Reports/Note   2013  consult  -Nic Maier MD   Discharge Instruction   2013 [facility] Discharge Med List   Discharge Summaries   2013 [facility] Discharge Summary   EKG   -2013 EKG      H&P   2013 H&P [address]  13 records from 13 to present faxed to Randy MONROY) [facility] Physician Consent and NPP Signed () 13    ACO Consent for the Release of  Confidential Alcohol or Drug Treatment Information Not Received     ACO Declining to [address] [facility] Not Received     Consultation Reports/Note   2014 [facility] #   Han Au [id number]   Address Phone   [address] [facility], [address] [place] [address]           Medical Problems   Comment      Last edited by  on  at    LARRY WEIR Problem List   Date Reviewed: 11/2/2017            Codes Priority Class Noted POA   Primary osteoarthritis of right knee ICD-10-CM: M17.11  ICD-9-CM: 715.16   9/11/2018 Yes      Non-Hospital Problem List   Date Reviewed: 11/2/2017            Codes Priority Class Noted   CAD (coronary artery disease) ICD-10-CM: I25.10  ICD-9-CM: 414.00   Unknown   Overview Signed 11/1/2011  4:04 PM by Araceli Silverk-Comtjose luis   Stent to right coronary artery 9/2010   Hypertension ICD-10-CM: I10  ICD-9-CM: 401.9   Unknown   Rheumatoid arteritis ICD-10-CM: I00  ICD-9-CM: 447.8   Unknown   History of pulmonary fibrosis ICD-10-CM: Z87.09  ICD-9-CM: V12.69   Unknown   History of DVT (deep vein thrombosis) ICD-10-CM: Q01.460  ICD-9-CM: V12.51   Unknown   Thyroid disease ICD-10-CM: E07.9  ICD-9-CM: 246. 9   Unknown   Intrinsic asthma ICD-10-CM: J45.909  ICD-9-CM: 493.10   Unknown   Hypercholesterolemia ICD-10-CM: E78.00  ICD-9-CM: 272.0   Unknown   History of neutropenia ICD-10-CM: Z86.2  ICD-9-CM: V12.3   Unknown   Syncope, cardiogenic ICD-10-CM: R55  ICD-9-CM: 780.2   2/11/2015   Near syncope ICD-10-CM: R55  ICD-9-CM: 780.2   2/11/2015   Status post placement of implantable loop recorder ICD-10-CM: Z95.818  ICD-9-CM: V45.09   2/13/2015   Left carotid bruit ICD-10-CM: R09.89  ICD-9-CM: 785.9   3/9/2016   S/P coronary artery stent placement ICD-10-CM: Z95.5  ICD-9-CM: V45.82   3/9/2016   Chest pain ICD-10-CM: R07.9  ICD-9-CM: 786.50   3/9/2016   Pacemaker ICD-10-CM: Z95.0  ICD-9-CM: V45.01   11/17/2016   Sinus node dysfunction (HCC) ICD-10-CM: I49.5  ICD-9-CM: 427.81   Unknown   Lumbar stenosis with neurogenic claudication ICD-10-CM: M48.062  ICD-9-CM: 724.03   1/22/2018   Spondylolisthesis of lumbar region ICD-10-CM: M43.16  ICD-9-CM: 738.4   1/23/2018

## 2018-09-11 NOTE — CONSULTS
KNEE TOTAL ARTHROPLASTY performed by Edmundo Wolfe MD at [address] [place] [address] OR        Medications Prior to Admission:       Prior to Admission medications    Medication Sig Start Date End Date Taking? Authorizing Provider   isosorbide mononitrate (IMDUR) 30 MG extended release tablet Take 30 mg by mouth daily   Yes Historical Provider, MD   carvedilol (COREG) 12.5 MG tablet Take 1 tablet by mouth 2 times daily 7/27/18  Yes MELISSA Llanes   predniSONE (DELTASONE) 1 MG tablet Take 2 mg by mouth daily   Yes Historical Provider, MD   traMADol-acetaminophen (ULTRACET) 37.5-325 MG per tablet Take 1 tablet by mouth 2 times daily. .   Yes Historical Provider, MD   pregabalin (LYRICA) 50 MG capsule Take 1 capsule by mouth daily for 28 days. 2/2/18 9/11/18 Yes Carlos Juarez MD   valACYclovir (VALTREX) 500 MG tablet Take 1 tablet by mouth daily 2/2/18  Yes Carlos Juarez MD   pantoprazole (PROTONIX) 40 MG tablet Take 1 tablet by mouth daily 2/2/18  Yes Carlos Juarez MD   spironolactone (ALDACTONE) 25 MG tablet Take 25 mg by mouth daily   Yes Historical Provider, MD   cloNIDine (CATAPRES) 0.1 MG tablet Take 0.2 mg by mouth nightly  6/29/17  Yes Historical Provider, MD   levothyroxine (SYNTHROID) 75 MCG tablet Take 75 mcg by mouth Daily  5/15/14  Yes Historical Provider, MD   folic acid (FOLVITE) 1 MG tablet Take 1 mg by mouth daily. Yes Historical Provider, MD   cetirizine (ZYRTEC) 10 MG tablet Take 10 mg by mouth daily    Yes Historical Provider, MD   ezetimibe (ZETIA) 10 MG tablet Take 10 mg by mouth daily. Yes Historical Provider, MD   furosemide (LASIX) 40 MG tablet Take 40 mg by mouth daily.    Yes Historical Provider, MD   salsalate (DISALCID) 500 MG tablet Take 500 mg by mouth daily TAKE 5 PILLS DAILY Monday, Wednesday, Friday AND Sunday  TAKE 4 PILLS Tuesday, Thursday AND SATURDAY    Historical Provider, MD   leflunomide (ARAVA) 20 MG tablet Take 20 mg by mouth daily    Historical Provider, MD   adalimumab

## 2018-09-11 NOTE — PROGRESS NOTES
Occupational Therapy   Occupational Therapy Initial Assessment  Date: 2018   Patient Name: Melinda Ribera  MRN: [id number]     : 1955    Date of Service: 2018    Discharge Recommendations:            Patient Diagnosis(es): There were no encounter diagnoses. has a past medical history of CAD (coronary artery disease); Cellulitis; History of blood transfusion; History of DVT (deep vein thrombosis); History of neutropenia; History of pulmonary fibrosis; Hx of blood clots; Hypercholesterolemia; Hypertension; Intrinsic asthma; Pacemaker; Rheumatoid arteritis; Sinus node dysfunction ([address] Staph aureus infection; Status post placement of implantable loop recorder; Syncope; and Thyroid disease. has a past surgical history that includes Diagnostic Cardiac Cath Lab Procedure; Cardiac catheterization (14  MDL); Cardiac catheterization (1/26/15  MDL); LEEP; Coronary angioplasty with stent (); Pacemaker insertion (2016); back surgery (2018); and pr total knee arthroplasty (Right, 2018).     Treatment Diagnosis: R TKR      Restrictions       Subjective   General  Chart Reviewed: Yes  Patient assessed for rehabilitation services?: Yes  Family / Caregiver Present: Yes  Diagnosis: R TKR  General Comment  Comments: Pt. willing to get up OOB with therapy  Pain Assessment  Patient Currently in Pain: Denies  Pain Assessment: 0-10  Pain Level: 1  Oxygen Therapy  SpO2: 97 %  O2 Device: None (Room air)  Social/Functional History  Social/Functional History  Lives With: Spouse (Spouse works so pt. alone during the day)  Type of Home: [place]: 4 wheeled walker, Sock aid, Reacher  ADL Assistance: Independent (used AE at home for seated LB dsg.)  Ambulation Assistance: Independent  Transfer Assistance: Independent       Objective   Vision: Within Functional Limits  Hearing: Within functional limits    Orientation  Overall Orientation Status: Within Normal Limits     Standing Balance  Sit to stand: Contact guard assistance  ADL  Feeding: Independent  Grooming: Independent  UE Bathing: Supervision  LE Bathing: Minimal assistance  UE Dressing: Supervision  LE Dressing: Minimal assistance  Toileting: Contact guard assistance           Transfers  Stand Step Transfers: Contact guard assistance  Sit to stand: Contact guard assistance  Transfer Comments: With r.w. Cognition  Overall Cognitive Status: WNL                 LUE AROM (degrees)  LUE AROM : WNL  RUE AROM (degrees)  RUE AROM : WNL  LUE Strength  Gross LUE Strength: WNL  RUE Strength  Gross RUE Strength: WNL                  Assessment   Performance deficits / Impairments: Decreased functional mobility   Assessment: Pt. doing very well with eval tasks  Treatment Diagnosis: R TKR  Prognosis: Good  Decision Making: Low Complexity  REQUIRES OT FOLLOW UP: Yes  Activity Tolerance  Activity Tolerance: Patient Tolerated treatment well         Plan   Plan  Times per week: 3-5x/week  Times per day: Daily    G-Code  OT G-codes  Functional Assessment Tool Used: ADLs, transfers  Score: CJ  Functional Limitation: Self care  Self Care Current Status (): At least 20 percent but less than 40 percent impaired, limited or restricted  Self Care Goal Status (): At least 1 percent but less than 20 percent impaired, limited or restricted  OutComes Score                                           AM-PAC Score             Goals  Short term goals  Time Frame for Short term goals: 1 week  Short term goal 1: Modified I with seated LB dsg with AE  Short term goal 2: Supervision with clothing mgmt in standing  Short term goal 3: Supervision with toilet tfers with r.w.   Long term goals  Long term goal 1: Return to PLOF       Therapy Time   Individual Concurrent Group Co-treatment   Time In           Time Out           Minutes                   Vj Morales OTR/L

## 2018-09-11 NOTE — PROGRESS NOTES
Physical Therapy  Electronically signed by Myriam Shannon, PT on 9/11/2018 at 2:40 PM  [id number]   [place]     09/11/18 1437   Lower Extremity Weight Bearing Restrictions   Right Lower Extremity Weight Bearing Weight Bearing As Tolerated   General   Additional Pertinent Hx BACK SX, RA, PACER,   Subjective   Subjective pt STATES SHE INITIALLY HAD DEC STRENGTH/SENSATION IN L LE FOLLOWING BACK SX, BUT THIS HAS NOW IMPROVED GREATLY   General Comment   Comments pt ABLE TO MANAGE CLOTHING AND PERSONAL HYGIENE FOR TOILETING   Orientation   Overall Orientation Status WNL   Transfers   Sit to Stand Contact guard assistance   Stand to sit Contact guard assistance   Bed to Chair Contact guard assistance   Ambulation 1   Surface level tile   Device Rolling Walker   Assistance Contact guard assistance   Quality of Gait SLOW, MILDLY ANTALGIC, NO LOB    Distance 10 FT    Balance   Sitting - Static Good   Sitting - Dynamic Good   Standing - Static Good;-   Standing - Dynamic Good;-   Patient Goals    Patient goals  D/C HOME WITH    Short term goals   Time Frame for Short term goals 14 DAYS BEFORE RE EVAL   Short term goal 1 SUP<>SIT IND   Short term goal 2 SIT<>STAND SBA   Short term goal 3  FT WITH RW AND SBA   Short term goal 4 STEPS WITH CGA   Conditions Requiring Skilled Therapeutic Intervention   Body structures, Functions, Activity limitations Decreased functional mobility ; Decreased ROM; Decreased strength;Decreased endurance   Assessment ANTICIPATE pt TO PROGRESS WELL WITH TYPICAL TKR PROTOCOL   Treatment Diagnosis R TKR   Patient Education FALL PREVENTION   Discharge Recommendations Continue to assess pending progress; Patient would benefit from continued therapy after discharge   Activity Tolerance   Activity Tolerance Patient Tolerated treatment well   PT Equipment Recommendations   Other RW   Plan   Times per week PT AT LEAST ONCE DAILLY X 14 DAYS   Current Treatment Recommendations

## 2018-09-11 NOTE — PROGRESS NOTES
Limits    Social/Functional History  Social/Functional History  Lives With:  (Spouse works so pt. alone during the day)  Type of Home: House  Home Layout: Able to Live on Main level with bedroom/bathroom, Two level  Home Access:  ([address])  Bathroom Toilet: Handicap height  Home Equipment: Reacher, Sock aid, 4 wheeled walker  ADL Assistance: Independent (used AE at home for seated LB dsg.)  Ambulation Assistance: Independent  Transfer Assistance: Independent  Objective          AROM RLE (degrees)  RLE AROM: WFL  RLE General AROM: ABLE TO SIT EOB AND FLEX KNEE TO GROSSLY 85-90 DEG  AROM LLE (degrees)  LLE AROM : WFL  Strength RLE  Comment: ABLE TO MOVE LE AGAINST GRAVITY  Strength LLE  Strength LLE: WFL     Sensation  Overall Sensation Status: WFL  Bed mobility  Supine to Sit: Stand by assistance  Transfers  Sit to Stand: Contact guard assistance  Stand to sit: Contact guard assistance  Bed to Chair: Contact guard assistance  Ambulation  Ambulation?: Yes  Ambulation 1  Surface: level tile  Device: Rolling Walker  Other Apparatus:  (GT BELT)  Assistance: Contact guard assistance  Quality of Gait: SLOW, MILDLY ANTALGIC, NO LOB   Distance: 10 FT      Balance  Sitting - Static: Good  Sitting - Dynamic: Good  Standing - Static: Good;-  Standing - Dynamic: Good;-        Assessment   Body structures, Functions, Activity limitations: Decreased functional mobility ; Decreased ROM; Decreased strength;Decreased endurance  Assessment: ANTICIPATE pt TO PROGRESS WELL WITH TYPICAL TKR PROTOCOL  Treatment Diagnosis: R TKR  Prognosis: Good  Decision Making: Medium Complexity  Patient Education: FALL PREVENTION  Barriers to Learning: NONE  REQUIRES PT FOLLOW UP: Yes  Activity Tolerance  Activity Tolerance: Patient Tolerated treatment well         Plan   Plan  Times per week: PT AT LEAST ONCE DAILLY X 14 DAYS  Current Treatment Recommendations: Strengthening, Functional Mobility Training, Transfer Training, Gait Training,

## 2018-09-11 NOTE — ANESTHESIA PROCEDURE NOTES
Peripheral Block    Patient location during procedure: holding area  Start time: 9/11/2018 8:44 AM  End time: 9/11/2018 8:44 AM  Staffing  Anesthesiologist: Blondie Galeazzi  Performed: anesthesiologist   Preanesthetic Checklist  Completed: patient identified, site marked, surgical consent, pre-op evaluation, timeout performed, IV checked, risks and benefits discussed, monitors and equipment checked, anesthesia consent given, oxygen available and patient being monitored  Peripheral Block  Patient position: supine  Prep: ChloraPrep  Patient monitoring: cardiac monitor, continuous pulse ox, frequent blood pressure checks and IV access  Block type: Femoral  Laterality: right  Injection technique: single-shot  Procedures: ultrasound guided  Local infiltration: lidocaine  Infiltration strength: 1 %  Dose: 3 mL  Adductor canal  Provider prep: sterile gloves  Local infiltration: lidocaine  Needle  Needle type: combined needle/nerve stimulator   Needle gauge: 21 G  Needle length: 10 cm  Needle localization: ultrasound guidance  Assessment  Injection assessment: negative aspiration for heme, no paresthesia on injection and local visualized surrounding nerve on ultrasound  Paresthesia pain: none  Slow fractionated injection: yes  Hemodynamics: stable  Additional Notes  Under ultrasound (\"US\") guidance, a 21 gauge needle was inserted and placed in close proximity to the femoral nerve. Ultrasound was also used to visualize the spread of the anesthetic in close proximity to the nerve being blocked. The nerve appeared anatomically normal, and there were no abnormal pathological findings. A permanent image was recorded. Emergency resuscitation equipment and lipid emulsion readily available.     Reason for block: post-op pain management

## 2018-09-12 VITALS
OXYGEN SATURATION: 92 % | HEIGHT: 66 IN | HEART RATE: 59 BPM | WEIGHT: 216 LBS | SYSTOLIC BLOOD PRESSURE: 122 MMHG | RESPIRATION RATE: 18 BRPM | BODY MASS INDEX: 34.72 KG/M2 | TEMPERATURE: 98.2 F | DIASTOLIC BLOOD PRESSURE: 67 MMHG

## 2018-09-12 LAB
ANION GAP SERPL CALCULATED.3IONS-SCNC: 11 MMOL/L (ref 7–19)
BUN BLDV-MCNC: 12 MG/DL (ref 8–23)
CALCIUM SERPL-MCNC: 8.9 MG/DL (ref 8.8–10.2)
CHLORIDE BLD-SCNC: 107 MMOL/L (ref 98–111)
CO2: 25 MMOL/L (ref 22–29)
CREAT SERPL-MCNC: 0.5 MG/DL (ref 0.5–0.9)
FOLATE: 18.5 NG/ML (ref 4.8–37.3)
GFR NON-AFRICAN AMERICAN: >60
GLUCOSE BLD-MCNC: 123 MG/DL (ref 74–109)
HCT VFR BLD CALC: 33.4 % (ref 37–47)
HEMOGLOBIN: 10.3 G/DL (ref 12–16)
IRON SATURATION: 18 % (ref 14–50)
IRON: 36 UG/DL (ref 37–145)
POTASSIUM REFLEX MAGNESIUM: 4.5 MMOL/L (ref 3.5–5)
SODIUM BLD-SCNC: 143 MMOL/L (ref 136–145)
TOTAL IRON BINDING CAPACITY: 196 UG/DL (ref 250–400)
VITAMIN B-12: 389 PG/ML (ref 211–946)
VITAMIN D 25-HYDROXY: >60 NG/ML

## 2018-09-12 PROCEDURE — 97535 SELF CARE MNGMENT TRAINING: CPT

## 2018-09-12 PROCEDURE — 2580000003 HC RX 258: Performed by: ANESTHESIOLOGY

## 2018-09-12 PROCEDURE — 83540 ASSAY OF IRON: CPT

## 2018-09-12 PROCEDURE — 97116 GAIT TRAINING THERAPY: CPT

## 2018-09-12 PROCEDURE — 80048 BASIC METABOLIC PNL TOTAL CA: CPT

## 2018-09-12 PROCEDURE — 6360000002 HC RX W HCPCS: Performed by: ORTHOPAEDIC SURGERY

## 2018-09-12 PROCEDURE — 83550 IRON BINDING TEST: CPT

## 2018-09-12 PROCEDURE — 6370000000 HC RX 637 (ALT 250 FOR IP): Performed by: INTERNAL MEDICINE

## 2018-09-12 PROCEDURE — 6370000000 HC RX 637 (ALT 250 FOR IP): Performed by: ORTHOPAEDIC SURGERY

## 2018-09-12 PROCEDURE — 82746 ASSAY OF FOLIC ACID SERUM: CPT

## 2018-09-12 PROCEDURE — 82607 VITAMIN B-12: CPT

## 2018-09-12 PROCEDURE — 6360000002 HC RX W HCPCS: Performed by: INTERNAL MEDICINE

## 2018-09-12 PROCEDURE — 85018 HEMOGLOBIN: CPT

## 2018-09-12 PROCEDURE — 82306 VITAMIN D 25 HYDROXY: CPT

## 2018-09-12 PROCEDURE — 85014 HEMATOCRIT: CPT

## 2018-09-12 PROCEDURE — 36415 COLL VENOUS BLD VENIPUNCTURE: CPT

## 2018-09-12 RX ORDER — ASPIRIN 81 MG/1
81 TABLET ORAL 2 TIMES DAILY
Qty: 60 TABLET | Refills: 0 | Status: ON HOLD | OUTPATIENT
Start: 2018-09-12 | End: 2018-10-23 | Stop reason: HOSPADM

## 2018-09-12 RX ORDER — OXYCODONE HYDROCHLORIDE AND ACETAMINOPHEN 5; 325 MG/1; MG/1
1-2 TABLET ORAL EVERY 4 HOURS PRN
Qty: 40 TABLET | Refills: 0 | Status: SHIPPED | OUTPATIENT
Start: 2018-09-12 | End: 2018-09-14

## 2018-09-12 RX ORDER — DOCUSATE SODIUM 100 MG/1
100 CAPSULE, LIQUID FILLED ORAL DAILY
Qty: 20 CAPSULE | Refills: 0 | Status: SHIPPED | OUTPATIENT
Start: 2018-09-12

## 2018-09-12 RX ADMIN — Medication 2 G: at 01:20

## 2018-09-12 RX ADMIN — PREGABALIN 50 MG: 50 CAPSULE ORAL at 09:27

## 2018-09-12 RX ADMIN — BACITRACIN: 500 OINTMENT TOPICAL at 09:29

## 2018-09-12 RX ADMIN — LEVOTHYROXINE SODIUM 75 MCG: 75 TABLET ORAL at 06:46

## 2018-09-12 RX ADMIN — OXYCODONE AND ACETAMINOPHEN 2 TABLET: 5; 325 TABLET ORAL at 13:48

## 2018-09-12 RX ADMIN — OXYCODONE AND ACETAMINOPHEN 2 TABLET: 5; 325 TABLET ORAL at 01:20

## 2018-09-12 RX ADMIN — PREDNISONE 2 MG: 1 TABLET ORAL at 09:28

## 2018-09-12 RX ADMIN — SODIUM CHLORIDE, POTASSIUM CHLORIDE, SODIUM LACTATE AND CALCIUM CHLORIDE: 600; 310; 30; 20 INJECTION, SOLUTION INTRAVENOUS at 06:44

## 2018-09-12 RX ADMIN — FOLIC ACID 1 MG: 1 TABLET ORAL at 09:28

## 2018-09-12 RX ADMIN — ISOSORBIDE MONONITRATE 30 MG: 30 TABLET, EXTENDED RELEASE ORAL at 09:28

## 2018-09-12 RX ADMIN — CARVEDILOL 12.5 MG: 12.5 TABLET, FILM COATED ORAL at 09:28

## 2018-09-12 RX ADMIN — IRON SUCROSE 200 MG: 20 INJECTION, SOLUTION INTRAVENOUS at 09:28

## 2018-09-12 RX ADMIN — SPIRONOLACTONE 25 MG: 25 TABLET ORAL at 09:27

## 2018-09-12 RX ADMIN — OXYCODONE AND ACETAMINOPHEN 2 TABLET: 5; 325 TABLET ORAL at 06:46

## 2018-09-12 RX ADMIN — DOCUSATE SODIUM 100 MG: 100 CAPSULE, LIQUID FILLED ORAL at 09:28

## 2018-09-12 RX ADMIN — OXYCODONE AND ACETAMINOPHEN 2 TABLET: 5; 325 TABLET ORAL at 10:38

## 2018-09-12 RX ADMIN — Medication 2 MG: at 03:08

## 2018-09-12 RX ADMIN — FLUTICASONE PROPIONATE 2 SPRAY: 50 SPRAY, METERED NASAL at 09:27

## 2018-09-12 ASSESSMENT — PAIN DESCRIPTION - LOCATION: LOCATION: KNEE

## 2018-09-12 ASSESSMENT — ENCOUNTER SYMPTOMS
COUGH: 0
NAUSEA: 0
VOMITING: 0
SHORTNESS OF BREATH: 0
DIARRHEA: 0
DOUBLE VISION: 0
BLURRED VISION: 0

## 2018-09-12 ASSESSMENT — PAIN SCALES - WONG BAKER: WONGBAKER_NUMERICALRESPONSE: 4

## 2018-09-12 ASSESSMENT — PAIN DESCRIPTION - PAIN TYPE: TYPE: SURGICAL PAIN

## 2018-09-12 ASSESSMENT — PAIN SCALES - GENERAL
PAINLEVEL_OUTOF10: 7
PAINLEVEL_OUTOF10: 5
PAINLEVEL_OUTOF10: 7

## 2018-09-12 ASSESSMENT — PAIN DESCRIPTION - ORIENTATION: ORIENTATION: RIGHT

## 2018-09-12 ASSESSMENT — PAIN DESCRIPTION - DESCRIPTORS: DESCRIPTORS: ACHING

## 2018-09-12 ASSESSMENT — PAIN DESCRIPTION - FREQUENCY: FREQUENCY: CONTINUOUS

## 2018-09-12 NOTE — PROGRESS NOTES
12.5 mg BID   0.9 % sodium chloride bolus PRN   lactated ringers infusion Continuous   sodium chloride flush 0.9 % injection 10 mL 2 times per day   sodium chloride flush 0.9 % injection 10 mL PRN   acetaminophen (TYLENOL) tablet 650 mg Q4H PRN   oxyCODONE-acetaminophen (PERCOCET) 5-325 MG per tablet 1 tablet Q4H PRN   Or    oxyCODONE-acetaminophen (PERCOCET) 5-325 MG per tablet 2 tablet Q4H PRN   morphine injection 2 mg Q2H PRN   Or    morphine injection 4 mg Q2H PRN   docusate sodium (COLACE) capsule 100 mg BID   magnesium hydroxide (MILK OF MAGNESIA) 400 MG/5ML suspension 30 mL Daily PRN   bisacodyl (DULCOLAX) suppository 10 mg Daily PRN   ondansetron (ZOFRAN) injection 4 mg Q6H PRN   rivaroxaban (XARELTO) tablet 10 mg Daily   lactated ringers infusion Continuous   bacitracin ointment TID       Data:     Code Status:  Full Code    Family History   Problem Relation Age of Onset    Cancer Mother         LUNG CA    Heart Attack Father 39        FATAL MI       Social History     Social History    Marital status:      Spouse name: N/A    Number of children: N/A    Years of education: N/A     Occupational History    Not on file. Social History Main Topics    Smoking status: Never Smoker    Smokeless tobacco: Never Used    Alcohol use No    Drug use: Unknown    Sexual activity: Not on file     Other Topics Concern    Not on file     Social History Narrative    No narrative on file       Vitals:  /69   Pulse 61   Temp 97.2 °F (36.2 °C) (Temporal)   Resp 18   Ht 5' 6\" (1.676 m)   Wt 216 lb (98 kg)   SpO2 93%   BMI 34.86 kg/m²   Temp (24hrs), Av.8 °F (35.4 °C), Min:95 °F (35 °C), Max:97.6 °F (36.4 °C)    Lab Results   Component Value Date/Time    POC Glucose 225 (H) 2018 04:17 PM           I/O (24Hr):     Intake/Output Summary (Last 24 hours) at 18 0724  Last data filed at 18 0639   Gross per 24 hour   Intake             4694 ml   Output             2125 ml   Net 2569 ml       Labs:  Lab Results   Component Value Date/Time    Sodium 143 09/12/2018 05:14 AM    Potassium reflex Magnesium 4.5 09/12/2018 05:14 AM    Chloride 107 09/12/2018 05:14 AM    CO2 25 09/12/2018 05:14 AM    BUN 12 09/12/2018 05:14 AM    CREATININE 0.5 09/12/2018 05:14 AM    Glucose 123 (H) 09/12/2018 05:14 AM    Calcium 8.9 09/12/2018 05:14 AM    Anion Gap 11 09/12/2018 05:14 AM     Lab Results   Component Value Date/Time    Hemoglobin 10.3 (L) 09/12/2018 05:14 AM    Hematocrit 33.4 (L) 09/12/2018 05:14 AM                             Lab Results   Component Value Date/Time    Iron 36 (L) 09/12/2018 05:14 AM    TIBC 196 (L) 09/12/2018 05:14 AM    Folate 18.5 09/12/2018 05:14 AM    Vitamin B-12 389 09/12/2018 05:14 AM                                            Physical Examination:        Physical Exam   Constitutional: She is oriented to person, place, and time. She appears well-developed and well-nourished. No distress. HENT:   Head: Normocephalic and atraumatic. Mouth/Throat: Oropharynx is clear and moist.   Eyes: Pupils are equal, round, and reactive to light. EOM are normal. No scleral icterus. Neck: Normal range of motion. Neck supple. No tracheal deviation present. No thyromegaly present. Cardiovascular: Normal rate, regular rhythm and intact distal pulses. Pulmonary/Chest: Effort normal and breath sounds normal. No respiratory distress. She exhibits no tenderness. Abdominal: Soft. Bowel sounds are normal. She exhibits no distension. There is no tenderness. Musculoskeletal: Normal range of motion. She exhibits tenderness (right knee pain with rom). She exhibits no edema. Neurological: She is alert and oriented to person, place, and time. She displays normal reflexes. No cranial nerve deficit. Skin: Skin is warm and dry. Psychiatric: She has a normal mood and affect. Her behavior is normal.   Nursing note and vitals reviewed.         Assessment:        Primary

## 2018-09-12 NOTE — CARE COORDINATION
Spoke with patient regarding MD orders for [facility] services. Patient agreeable and has chosen [facility]. Referral Faxed. [facility] [phone]. [phone]. [facility] notified of patient discharge today. DC Summary and DC med list faxed.   Electronically signed by Darnell Sanchez RN on 9/12/2018 at 3:32 PM

## 2018-09-12 NOTE — PLAN OF CARE
Problem: Discharge Planning:  Goal: Discharged to appropriate level of care  Discharged to appropriate level of care   Outcome: Ongoing      Problem: Mobility - Impaired:  Goal: Mobility will improve  Mobility will improve   Outcome: Ongoing      Problem: Infection - Surgical Site:  Goal: Will show no infection signs and symptoms  Will show no infection signs and symptoms   Outcome: Ongoing      Problem: Pain - Acute:  Goal: Pain level will decrease  Pain level will decrease   Outcome: Ongoing      Problem: Falls - Risk of:  Goal: Will remain free from falls  Will remain free from falls   Outcome: Ongoing    Goal: Absence of physical injury  Absence of physical injury   Outcome: Ongoing

## 2018-09-13 ENCOUNTER — CARE COORDINATION (OUTPATIENT)
Dept: CASE MANAGEMENT | Age: 63
End: 2018-09-13

## 2018-09-13 DIAGNOSIS — M17.11 PRIMARY OSTEOARTHRITIS OF RIGHT KNEE: Primary | ICD-10-CM

## 2018-09-13 PROCEDURE — 1111F DSCHRG MED/CURRENT MED MERGE: CPT | Performed by: INTERNAL MEDICINE

## 2018-09-13 NOTE — CARE COORDINATION
[facility] Initial [facility] Up [facility]    Call within 2 business days of discharge: Yes    Patient: Corona Alcaraz Patient : 1955   MRN: [id number]  Reason for Admission: There are no discharge diagnoses documented for the most recent discharge. Discharge Date: 18 RARS: Readmission Risk Score: 13     Spoke with: [address]: [facility]      Non-face-to-face services provided: Reviewed admission information for continuity of care prior to call - chart notes, consults, progress notes, test results, med list, appointments, AVS, other information. Care Transitions 24 Hour Call    Schedule Follow Up Appointment with PCP:  Completed  Do you have any ongoing symptoms?:  No  Do you have a copy of your discharge instructions?:  Yes  Do you have all of your prescriptions and are they filled?:  Yes  Have you been contacted by a [facility] Pharmacist?:  No  Have you scheduled your follow up appointment?:  Yes  How are you going to get to your appointment?:  Car - family or friend to transport  Were you discharged with any Home Care or Post Acute Services:  Yes  Post Acute Services:  Home Health  Patient DME:  Sidney Martinez you have support at home?:  Partner/Spouse/SO  Do you feel like you have everything you need to keep you well at home?:  Yes  Are you an active caregiver in your home?:  No  Care Transitions Interventions         Follow Up: Placed a call to patient for an initial follow up call post discharge. She reported that she had a rough night last night with poor pain control and just restless sleeping. She reported that she has finally figured out a routine for taking her meds and her pain is better controlled today. She is sitting in her chair. Reported that the SN and PT from home health have both already been there. She has been given a packet of exercises to work on between now and Monday when PT will be back.   She is sitting and using ice pack to helping with pain control and also swelling in the knee. Reported that it is helping. She reported that she is eating well, drinking plenty of fluids. She did say that she has not had a BM since Monday. We discussed an appropriate diet to enhance routine bowel patterns, increased fiber intake, fluids also. Discussed activity levels. She denied any bladder issues. Has been up and about some, walking mostly, more so than doing exercises at this time. Denies any issues with incision, no redness, inflammation, edema, drainage, warmth or other symptoms. Reported that there is a long, large brown dressing on it. Discussed meds, hospital follow up appointments. Reported that she is not planning to use her staircase for awhile. Will be sleeping upstairs with easy access to bathroom, kitchen, outdoors. No new issues noted. To call prn issues. Will follow up as indicated.        Future Appointments  Date Time Provider Troy Muniz   11/7/2018 9:15 AM Otoniel Segovia, MELISSA [facility] Trevizo RN

## 2018-09-17 ENCOUNTER — CARE COORDINATION (OUTPATIENT)
Dept: CASE MANAGEMENT | Age: 63
End: 2018-09-17

## 2018-09-17 NOTE — CARE COORDINATION
[facility] 45 Transitions Follow Up Call    2018    Patient: Eneida Becerra  Patient : 1955   MRN: [id number]  Reason for Admission: There are no discharge diagnoses documented for the most recent discharge. Discharge Date: 18 RARS: Readmission Risk Score: 15       Spoke with: [id number] Transitions Subsequent and Final Call    Subsequent and Final Calls  Have your medications changed?:  No  Do you have any questions related to your medications?:  No  Do you currently have any active services?:  Yes  Are you currently active with any services?:  Home Health  Do you have any needs or concerns that I can assist you with?:  No  Identified Barriers:  None  Care Transitions Interventions  Other Interventions: Follow Up: Spoke with patient briefly as the therapist was there working with her. She reported that she is doing fair. She is still having some pain, but realizes this is normal.  No incisional issues. Bowels moving. Pain is there, but controlled. Eating a little better. No other issues reported. To call prn problems. Will follow up in a few days.        Future Appointments  Date Time Provider Troy Muniz   2018 9:15 AM MELISSA Joyce [facility] Barone RN

## 2018-09-21 ENCOUNTER — CARE COORDINATION (OUTPATIENT)
Dept: CASE MANAGEMENT | Age: 63
End: 2018-09-21

## 2018-09-25 ENCOUNTER — TELEPHONE (OUTPATIENT)
Dept: OBSTETRICS AND GYNECOLOGY | Facility: CLINIC | Age: 63
End: 2018-09-25

## 2018-09-25 NOTE — TELEPHONE ENCOUNTER
ORDERED PROLIA INJECTION FROM [facility] ON 09/25/2018 WILL BE DELIVERED ON 09/26/2018.  PT HAS NO MORE REFILLS

## 2018-09-26 DIAGNOSIS — M81.0 OSTEOPOROSIS, UNSPECIFIED OSTEOPOROSIS TYPE, UNSPECIFIED PATHOLOGICAL FRACTURE PRESENCE: ICD-10-CM

## 2018-10-23 ENCOUNTER — ANESTHESIA EVENT (OUTPATIENT)
Dept: OPERATING ROOM | Age: 63
End: 2018-10-23
Payer: MEDICARE

## 2018-10-23 ENCOUNTER — ANESTHESIA (OUTPATIENT)
Dept: OPERATING ROOM | Age: 63
End: 2018-10-23
Payer: MEDICARE

## 2018-10-23 ENCOUNTER — HOSPITAL ENCOUNTER (OUTPATIENT)
Age: 63
Setting detail: OUTPATIENT SURGERY
Discharge: HOME | End: 2018-10-23
Attending: ORTHOPAEDIC SURGERY | Admitting: ORTHOPAEDIC SURGERY
Payer: MEDICARE

## 2018-10-23 VITALS
HEIGHT: 66 IN | WEIGHT: 216 LBS | BODY MASS INDEX: 34.72 KG/M2 | TEMPERATURE: 97.2 F | HEART RATE: 66 BPM | OXYGEN SATURATION: 94 % | SYSTOLIC BLOOD PRESSURE: 128 MMHG | DIASTOLIC BLOOD PRESSURE: 73 MMHG | RESPIRATION RATE: 14 BRPM

## 2018-10-23 VITALS
TEMPERATURE: 96.1 F | RESPIRATION RATE: 9 BRPM | SYSTOLIC BLOOD PRESSURE: 130 MMHG | OXYGEN SATURATION: 100 % | DIASTOLIC BLOOD PRESSURE: 63 MMHG

## 2018-10-23 DIAGNOSIS — M17.11 PRIMARY OSTEOARTHRITIS OF RIGHT KNEE: Primary | ICD-10-CM

## 2018-10-23 PROCEDURE — 6360000002 HC RX W HCPCS: Performed by: ORTHOPAEDIC SURGERY

## 2018-10-23 PROCEDURE — 2500000003 HC RX 250 WO HCPCS: Performed by: ORTHOPAEDIC SURGERY

## 2018-10-23 PROCEDURE — 7100000001 HC PACU RECOVERY - ADDTL 15 MIN: Performed by: ORTHOPAEDIC SURGERY

## 2018-10-23 PROCEDURE — 7100000000 HC PACU RECOVERY - FIRST 15 MIN: Performed by: ORTHOPAEDIC SURGERY

## 2018-10-23 PROCEDURE — 87070 CULTURE OTHR SPECIMN AEROBIC: CPT

## 2018-10-23 PROCEDURE — 87077 CULTURE AEROBIC IDENTIFY: CPT

## 2018-10-23 PROCEDURE — 2580000003 HC RX 258: Performed by: ORTHOPAEDIC SURGERY

## 2018-10-23 PROCEDURE — 3600000013 HC SURGERY LEVEL 3 ADDTL 15MIN: Performed by: ORTHOPAEDIC SURGERY

## 2018-10-23 PROCEDURE — 3700000001 HC ADD 15 MINUTES (ANESTHESIA): Performed by: ORTHOPAEDIC SURGERY

## 2018-10-23 PROCEDURE — 3600000003 HC SURGERY LEVEL 3 BASE: Performed by: ORTHOPAEDIC SURGERY

## 2018-10-23 PROCEDURE — 2500000003 HC RX 250 WO HCPCS: Performed by: NURSE ANESTHETIST, CERTIFIED REGISTERED

## 2018-10-23 PROCEDURE — 6360000002 HC RX W HCPCS: Performed by: NURSE ANESTHETIST, CERTIFIED REGISTERED

## 2018-10-23 PROCEDURE — 87186 SC STD MICRODIL/AGAR DIL: CPT

## 2018-10-23 PROCEDURE — 7100000011 HC PHASE II RECOVERY - ADDTL 15 MIN: Performed by: ORTHOPAEDIC SURGERY

## 2018-10-23 PROCEDURE — 6370000000 HC RX 637 (ALT 250 FOR IP): Performed by: ORTHOPAEDIC SURGERY

## 2018-10-23 PROCEDURE — 87205 SMEAR GRAM STAIN: CPT

## 2018-10-23 PROCEDURE — 87102 FUNGUS ISOLATION CULTURE: CPT

## 2018-10-23 PROCEDURE — 3700000000 HC ANESTHESIA ATTENDED CARE: Performed by: ORTHOPAEDIC SURGERY

## 2018-10-23 PROCEDURE — 7100000010 HC PHASE II RECOVERY - FIRST 15 MIN: Performed by: ORTHOPAEDIC SURGERY

## 2018-10-23 PROCEDURE — 2709999900 HC NON-CHARGEABLE SUPPLY: Performed by: ORTHOPAEDIC SURGERY

## 2018-10-23 PROCEDURE — 87176 TISSUE HOMOGENIZATION CULTR: CPT

## 2018-10-23 RX ORDER — SODIUM CHLORIDE, SODIUM LACTATE, POTASSIUM CHLORIDE, CALCIUM CHLORIDE 600; 310; 30; 20 MG/100ML; MG/100ML; MG/100ML; MG/100ML
INJECTION, SOLUTION INTRAVENOUS CONTINUOUS
Status: DISCONTINUED | OUTPATIENT
Start: 2018-10-23 | End: 2018-10-23 | Stop reason: HOSPADM

## 2018-10-23 RX ORDER — MORPHINE SULFATE/0.9% NACL/PF 1 MG/ML
2 SYRINGE (ML) INJECTION EVERY 5 MIN PRN
Status: DISCONTINUED | OUTPATIENT
Start: 2018-10-23 | End: 2018-10-23 | Stop reason: HOSPADM

## 2018-10-23 RX ORDER — SULFAMETHOXAZOLE AND TRIMETHOPRIM 800; 160 MG/1; MG/1
1 TABLET ORAL 2 TIMES DAILY
Qty: 20 TABLET | Refills: 0 | Status: SHIPPED | OUTPATIENT
Start: 2018-10-23 | End: 2018-11-02

## 2018-10-23 RX ORDER — HYDROCODONE BITARTRATE AND ACETAMINOPHEN 5; 325 MG/1; MG/1
1 TABLET ORAL EVERY 6 HOURS PRN
Qty: 30 TABLET | Refills: 0 | Status: SHIPPED | OUTPATIENT
Start: 2018-10-23 | End: 2018-10-25

## 2018-10-23 RX ORDER — MEPERIDINE HYDROCHLORIDE 50 MG/ML
12.5 INJECTION INTRAMUSCULAR; INTRAVENOUS; SUBCUTANEOUS EVERY 5 MIN PRN
Status: DISCONTINUED | OUTPATIENT
Start: 2018-10-23 | End: 2018-10-23 | Stop reason: HOSPADM

## 2018-10-23 RX ORDER — DIPHENHYDRAMINE HYDROCHLORIDE 50 MG/ML
12.5 INJECTION INTRAMUSCULAR; INTRAVENOUS
Status: DISCONTINUED | OUTPATIENT
Start: 2018-10-23 | End: 2018-10-23 | Stop reason: HOSPADM

## 2018-10-23 RX ORDER — LABETALOL HYDROCHLORIDE 5 MG/ML
INJECTION, SOLUTION INTRAVENOUS PRN
Status: DISCONTINUED | OUTPATIENT
Start: 2018-10-23 | End: 2018-10-23 | Stop reason: SDUPTHER

## 2018-10-23 RX ORDER — MORPHINE SULFATE 4 MG/ML
4 INJECTION, SOLUTION INTRAMUSCULAR; INTRAVENOUS
Status: DISCONTINUED | OUTPATIENT
Start: 2018-10-23 | End: 2018-10-23 | Stop reason: HOSPADM

## 2018-10-23 RX ORDER — METOCLOPRAMIDE HYDROCHLORIDE 5 MG/ML
10 INJECTION INTRAMUSCULAR; INTRAVENOUS
Status: DISCONTINUED | OUTPATIENT
Start: 2018-10-23 | End: 2018-10-23 | Stop reason: HOSPADM

## 2018-10-23 RX ORDER — FENTANYL CITRATE 50 UG/ML
50 INJECTION, SOLUTION INTRAMUSCULAR; INTRAVENOUS
Status: DISCONTINUED | OUTPATIENT
Start: 2018-10-23 | End: 2018-10-23 | Stop reason: HOSPADM

## 2018-10-23 RX ORDER — MIDAZOLAM HYDROCHLORIDE 1 MG/ML
2 INJECTION INTRAMUSCULAR; INTRAVENOUS
Status: DISCONTINUED | OUTPATIENT
Start: 2018-10-23 | End: 2018-10-23 | Stop reason: HOSPADM

## 2018-10-23 RX ORDER — SODIUM CHLORIDE 0.9 % (FLUSH) 0.9 %
10 SYRINGE (ML) INJECTION PRN
Status: DISCONTINUED | OUTPATIENT
Start: 2018-10-23 | End: 2018-10-23 | Stop reason: HOSPADM

## 2018-10-23 RX ORDER — ONDANSETRON 2 MG/ML
INJECTION INTRAMUSCULAR; INTRAVENOUS PRN
Status: DISCONTINUED | OUTPATIENT
Start: 2018-10-23 | End: 2018-10-23 | Stop reason: SDUPTHER

## 2018-10-23 RX ORDER — SCOLOPAMINE TRANSDERMAL SYSTEM 1 MG/1
1 PATCH, EXTENDED RELEASE TRANSDERMAL ONCE
Status: DISCONTINUED | OUTPATIENT
Start: 2018-10-23 | End: 2018-10-23 | Stop reason: HOSPADM

## 2018-10-23 RX ORDER — PROPOFOL 10 MG/ML
INJECTION, EMULSION INTRAVENOUS PRN
Status: DISCONTINUED | OUTPATIENT
Start: 2018-10-23 | End: 2018-10-23 | Stop reason: SDUPTHER

## 2018-10-23 RX ORDER — CLINDAMYCIN HYDROCHLORIDE 300 MG/1
300 CAPSULE ORAL 3 TIMES DAILY
Qty: 30 CAPSULE | Refills: 0 | Status: SHIPPED | OUTPATIENT
Start: 2018-10-23 | End: 2018-11-02

## 2018-10-23 RX ORDER — HYDROCODONE BITARTRATE AND ACETAMINOPHEN 5; 325 MG/1; MG/1
1 TABLET ORAL ONCE
Status: COMPLETED | OUTPATIENT
Start: 2018-10-23 | End: 2018-10-23

## 2018-10-23 RX ORDER — MORPHINE SULFATE/0.9% NACL/PF 1 MG/ML
4 SYRINGE (ML) INJECTION EVERY 5 MIN PRN
Status: DISCONTINUED | OUTPATIENT
Start: 2018-10-23 | End: 2018-10-23 | Stop reason: HOSPADM

## 2018-10-23 RX ORDER — LIDOCAINE HYDROCHLORIDE 10 MG/ML
INJECTION, SOLUTION INFILTRATION; PERINEURAL PRN
Status: DISCONTINUED | OUTPATIENT
Start: 2018-10-23 | End: 2018-10-23 | Stop reason: SDUPTHER

## 2018-10-23 RX ORDER — KETAMINE HYDROCHLORIDE 100 MG/ML
INJECTION, SOLUTION INTRAMUSCULAR; INTRAVENOUS PRN
Status: DISCONTINUED | OUTPATIENT
Start: 2018-10-23 | End: 2018-10-23 | Stop reason: SDUPTHER

## 2018-10-23 RX ORDER — ROCURONIUM BROMIDE 10 MG/ML
INJECTION, SOLUTION INTRAVENOUS PRN
Status: DISCONTINUED | OUTPATIENT
Start: 2018-10-23 | End: 2018-10-23 | Stop reason: SDUPTHER

## 2018-10-23 RX ORDER — LABETALOL HYDROCHLORIDE 5 MG/ML
5 INJECTION, SOLUTION INTRAVENOUS EVERY 10 MIN PRN
Status: DISCONTINUED | OUTPATIENT
Start: 2018-10-23 | End: 2018-10-23 | Stop reason: HOSPADM

## 2018-10-23 RX ORDER — FENTANYL CITRATE 50 UG/ML
INJECTION, SOLUTION INTRAMUSCULAR; INTRAVENOUS PRN
Status: DISCONTINUED | OUTPATIENT
Start: 2018-10-23 | End: 2018-10-23 | Stop reason: SDUPTHER

## 2018-10-23 RX ORDER — SODIUM CHLORIDE 0.9 % (FLUSH) 0.9 %
10 SYRINGE (ML) INJECTION EVERY 12 HOURS SCHEDULED
Status: DISCONTINUED | OUTPATIENT
Start: 2018-10-23 | End: 2018-10-23 | Stop reason: HOSPADM

## 2018-10-23 RX ORDER — LIDOCAINE HYDROCHLORIDE 10 MG/ML
1 INJECTION, SOLUTION EPIDURAL; INFILTRATION; INTRACAUDAL; PERINEURAL
Status: DISCONTINUED | OUTPATIENT
Start: 2018-10-23 | End: 2018-10-23 | Stop reason: HOSPADM

## 2018-10-23 RX ORDER — HYDRALAZINE HYDROCHLORIDE 20 MG/ML
5 INJECTION INTRAMUSCULAR; INTRAVENOUS EVERY 10 MIN PRN
Status: DISCONTINUED | OUTPATIENT
Start: 2018-10-23 | End: 2018-10-23 | Stop reason: HOSPADM

## 2018-10-23 RX ORDER — MIDAZOLAM HYDROCHLORIDE 1 MG/ML
INJECTION INTRAMUSCULAR; INTRAVENOUS PRN
Status: DISCONTINUED | OUTPATIENT
Start: 2018-10-23 | End: 2018-10-23 | Stop reason: SDUPTHER

## 2018-10-23 RX ORDER — ENALAPRILAT 2.5 MG/2ML
1.25 INJECTION INTRAVENOUS
Status: DISCONTINUED | OUTPATIENT
Start: 2018-10-23 | End: 2018-10-23 | Stop reason: HOSPADM

## 2018-10-23 RX ORDER — PROMETHAZINE HYDROCHLORIDE 25 MG/ML
6.25 INJECTION, SOLUTION INTRAMUSCULAR; INTRAVENOUS
Status: DISCONTINUED | OUTPATIENT
Start: 2018-10-23 | End: 2018-10-23 | Stop reason: HOSPADM

## 2018-10-23 RX ORDER — DEXAMETHASONE SODIUM PHOSPHATE 10 MG/ML
INJECTION INTRAMUSCULAR; INTRAVENOUS PRN
Status: DISCONTINUED | OUTPATIENT
Start: 2018-10-23 | End: 2018-10-23 | Stop reason: SDUPTHER

## 2018-10-23 RX ADMIN — FENTANYL CITRATE 50 MCG: 50 INJECTION INTRAMUSCULAR; INTRAVENOUS at 15:30

## 2018-10-23 RX ADMIN — LABETALOL HYDROCHLORIDE 5 MG: 5 INJECTION, SOLUTION INTRAVENOUS at 15:32

## 2018-10-23 RX ADMIN — DEXAMETHASONE SODIUM PHOSPHATE 10 MG: 10 INJECTION INTRAMUSCULAR; INTRAVENOUS at 15:17

## 2018-10-23 RX ADMIN — LIDOCAINE HYDROCHLORIDE 50 MG: 10 INJECTION, SOLUTION INFILTRATION; PERINEURAL at 15:07

## 2018-10-23 RX ADMIN — HYDROCODONE BITARTRATE AND ACETAMINOPHEN 1 TABLET: 5; 325 TABLET ORAL at 17:45

## 2018-10-23 RX ADMIN — PROPOFOL 150 MG: 10 INJECTION, EMULSION INTRAVENOUS at 15:07

## 2018-10-23 RX ADMIN — FENTANYL CITRATE 25 MCG: 50 INJECTION INTRAMUSCULAR; INTRAVENOUS at 16:10

## 2018-10-23 RX ADMIN — SUGAMMADEX 200 MG: 100 INJECTION, SOLUTION INTRAVENOUS at 16:08

## 2018-10-23 RX ADMIN — LABETALOL HYDROCHLORIDE 5 MG: 5 INJECTION, SOLUTION INTRAVENOUS at 15:35

## 2018-10-23 RX ADMIN — Medication 0.25 MG: at 16:43

## 2018-10-23 RX ADMIN — MIDAZOLAM 2 MG: 1 INJECTION INTRAMUSCULAR; INTRAVENOUS at 15:02

## 2018-10-23 RX ADMIN — Medication 0.25 MG: at 16:48

## 2018-10-23 RX ADMIN — LABETALOL HYDROCHLORIDE 5 MG: 5 INJECTION, SOLUTION INTRAVENOUS at 15:54

## 2018-10-23 RX ADMIN — SODIUM CHLORIDE, POTASSIUM CHLORIDE, SODIUM LACTATE AND CALCIUM CHLORIDE: 600; 310; 30; 20 INJECTION, SOLUTION INTRAVENOUS at 13:55

## 2018-10-23 RX ADMIN — ROCURONIUM BROMIDE 50 MG: 10 INJECTION INTRAVENOUS at 15:07

## 2018-10-23 RX ADMIN — FENTANYL CITRATE 50 MCG: 50 INJECTION INTRAMUSCULAR; INTRAVENOUS at 15:07

## 2018-10-23 RX ADMIN — Medication 30 MG: at 15:07

## 2018-10-23 RX ADMIN — FENTANYL CITRATE 50 MCG: 50 INJECTION INTRAMUSCULAR; INTRAVENOUS at 15:54

## 2018-10-23 RX ADMIN — ONDANSETRON HYDROCHLORIDE 4 MG: 2 INJECTION, SOLUTION INTRAMUSCULAR; INTRAVENOUS at 15:49

## 2018-10-23 RX ADMIN — VANCOMYCIN HYDROCHLORIDE 1800 MG: 1 INJECTION, POWDER, LYOPHILIZED, FOR SOLUTION INTRAVENOUS at 15:47

## 2018-10-23 ASSESSMENT — PAIN SCALES - GENERAL
PAINLEVEL_OUTOF10: 7
PAINLEVEL_OUTOF10: 6
PAINLEVEL_OUTOF10: 6
PAINLEVEL_OUTOF10: 10

## 2018-10-23 ASSESSMENT — PAIN DESCRIPTION - LOCATION
LOCATION: KNEE
LOCATION: KNEE

## 2018-10-23 ASSESSMENT — PAIN DESCRIPTION - PROGRESSION: CLINICAL_PROGRESSION: GRADUALLY IMPROVING

## 2018-10-23 ASSESSMENT — PAIN DESCRIPTION - FREQUENCY
FREQUENCY: CONTINUOUS
FREQUENCY: CONTINUOUS

## 2018-10-23 ASSESSMENT — LIFESTYLE VARIABLES: SMOKING_STATUS: 0

## 2018-10-23 ASSESSMENT — PAIN DESCRIPTION - PAIN TYPE
TYPE: SURGICAL PAIN
TYPE: SURGICAL PAIN

## 2018-10-23 ASSESSMENT — PAIN DESCRIPTION - ORIENTATION
ORIENTATION: RIGHT
ORIENTATION: RIGHT

## 2018-10-23 ASSESSMENT — PAIN DESCRIPTION - DESCRIPTORS
DESCRIPTORS: ACHING
DESCRIPTORS: ACHING

## 2018-10-23 ASSESSMENT — PAIN - FUNCTIONAL ASSESSMENT: PAIN_FUNCTIONAL_ASSESSMENT: 0-10

## 2018-10-23 NOTE — ANESTHESIA PRE PROCEDURE
spironolactone (ALDACTONE) 25 MG tablet Take 25 mg by mouth daily    Historical Provider, MD   ergocalciferol (ERGOCALCIFEROL) 62337 units capsule Take 50,000 Units by mouth once a week    Historical Provider, MD   cloNIDine (CATAPRES) 0.1 MG tablet Take 0.2 mg by mouth nightly  6/29/17   Historical Provider, MD   levothyroxine (SYNTHROID) 75 MCG tablet Take 75 mcg by mouth Daily  5/15/14   Historical Provider, MD   folic acid (FOLVITE) 1 MG tablet Take 1 mg by mouth daily. Historical Provider, MD   cetirizine (ZYRTEC) 10 MG tablet Take 10 mg by mouth daily     Historical Provider, MD   ezetimibe (ZETIA) 10 MG tablet Take 10 mg by mouth daily. Historical Provider, MD   furosemide (LASIX) 40 MG tablet Take 40 mg by mouth daily. Historical Provider, MD       Current medications:    No current facility-administered medications for this visit. No current outpatient prescriptions on file. Facility-Administered Medications Ordered in Other Visits   Medication Dose Route Frequency Provider Last Rate Last Dose    lactated ringers infusion   Intravenous Continuous Augusto Arora  mL/hr at 10/23/18 1355         Allergies:     Allergies   Allergen Reactions    Amoxicillin Rash    Lipitor Rash    Niaspan [Niacin Er] Rash    Penicillins Rash    Relafen [Nabumetone] Rash    Zocor [Simvastatin] Rash     Muscle cramps       Problem List:    Patient Active Problem List   Diagnosis Code    CAD (coronary artery disease) I25.10    Hypertension I10    Rheumatoid arteritis I00    History of pulmonary fibrosis Z87.09    History of DVT (deep vein thrombosis) Z86.718    Thyroid disease E07.9    Intrinsic asthma J45.909    Hypercholesterolemia E78.00    History of neutropenia Z86.2    Syncope, cardiogenic R55    Near syncope R55    Status post placement of implantable loop recorder Z95.818    Left carotid bruit R09.89    S/P coronary artery stent placement Z95.5    Chest pain R07.9  Pacemaker Z95.0    Sinus node dysfunction (HCC) I49.5    Lumbar stenosis with neurogenic claudication M48.062    Spondylolisthesis of lumbar region M43.16    Leg swelling M79.89    Primary osteoarthritis of right knee M17.11       Past Medical History:        Diagnosis Date    CAD (coronary artery disease)     S/p Stent to right coronary artery 9/2010. S/p myocardial infarction May 2013.  Cellulitis     LT LEG IN PAST    History of blood transfusion     History of DVT (deep vein thrombosis)     LT    History of neutropenia     History of pulmonary fibrosis     Hx of blood clots 1992    LT LEG    Hypercholesterolemia     Hypertension     Intrinsic asthma     Pacemaker 11/17/2016    Rheumatoid arteritis     Sinus node dysfunction (HCC)     Staph aureus infection     Status post placement of implantable loop recorder 2/13/15, 3/30/15    2/19/15  removed, infection    Syncope 2/11/2015    Thyroid disease     HYPOTHYROIDISM       Past Surgical History:        Procedure Laterality Date    BACK SURGERY  01/01/2018    CARDIAC CATHETERIZATION  5/14/14  MDL    normal LV function. EF 60%    CARDIAC CATHETERIZATION  1/26/15  MDL    EF 60%, Left circ has long 80-90% in small 2mm vessel, diffuse disease in RCA    CORONARY ANGIOPLASTY WITH STENT PLACEMENT  2014    DIAGNOSTIC CARDIAC CATH LAB PROCEDURE      Right coronary artery stent. 9/2010    LEEP      PACEMAKER INSERTION  11/17/2016    Dr Maicol Bartholomew Right 9/11/2018    KNEE TOTAL ARTHROPLASTY performed by Jamshid Green MD at [place] [address] History:    Social History   Substance Use Topics    Smoking status: Never Smoker    Smokeless tobacco: Never Used    Alcohol use No                                Counseling given: Not Answered      Vital Signs (Current): There were no vitals filed for this visit.                                            BP Readings from Last 3 Encounters:   10/23/18 135/74

## 2018-10-27 LAB
ANAEROBIC CULTURE: ABNORMAL
ANAEROBIC CULTURE: ABNORMAL
ANAEROBIC CULTURE: NORMAL
CULTURE SURGICAL: ABNORMAL
CULTURE SURGICAL: NORMAL
GRAM STAIN RESULT: ABNORMAL
GRAM STAIN RESULT: ABNORMAL
GRAM STAIN RESULT: NORMAL
ORGANISM: ABNORMAL

## 2018-11-07 ENCOUNTER — OFFICE VISIT (OUTPATIENT)
Dept: CARDIOLOGY | Age: 63
End: 2018-11-07
Payer: MEDICARE

## 2018-11-07 VITALS
HEART RATE: 67 BPM | HEIGHT: 66 IN | DIASTOLIC BLOOD PRESSURE: 72 MMHG | BODY MASS INDEX: 31.18 KG/M2 | WEIGHT: 194 LBS | SYSTOLIC BLOOD PRESSURE: 128 MMHG

## 2018-11-07 DIAGNOSIS — I49.5 SINUS NODE DYSFUNCTION (HCC): Primary | ICD-10-CM

## 2018-11-07 DIAGNOSIS — I25.10 CORONARY ARTERY DISEASE INVOLVING NATIVE CORONARY ARTERY OF NATIVE HEART WITHOUT ANGINA PECTORIS: ICD-10-CM

## 2018-11-07 DIAGNOSIS — Z95.0 PACEMAKER: ICD-10-CM

## 2018-11-07 DIAGNOSIS — I10 ESSENTIAL HYPERTENSION: ICD-10-CM

## 2018-11-07 PROCEDURE — G8417 CALC BMI ABV UP PARAM F/U: HCPCS | Performed by: CLINICAL NURSE SPECIALIST

## 2018-11-07 PROCEDURE — G8484 FLU IMMUNIZE NO ADMIN: HCPCS | Performed by: CLINICAL NURSE SPECIALIST

## 2018-11-07 PROCEDURE — 1036F TOBACCO NON-USER: CPT | Performed by: CLINICAL NURSE SPECIALIST

## 2018-11-07 PROCEDURE — 1123F ACP DISCUSS/DSCN MKR DOCD: CPT | Performed by: CLINICAL NURSE SPECIALIST

## 2018-11-07 PROCEDURE — G8427 DOCREV CUR MEDS BY ELIG CLIN: HCPCS | Performed by: CLINICAL NURSE SPECIALIST

## 2018-11-07 PROCEDURE — 3017F COLORECTAL CA SCREEN DOC REV: CPT | Performed by: CLINICAL NURSE SPECIALIST

## 2018-11-07 PROCEDURE — 93288 INTERROG EVL PM/LDLS PM IP: CPT | Performed by: CLINICAL NURSE SPECIALIST

## 2018-11-07 PROCEDURE — 1090F PRES/ABSN URINE INCON ASSESS: CPT | Performed by: CLINICAL NURSE SPECIALIST

## 2018-11-07 PROCEDURE — G8598 ASA/ANTIPLAT THER USED: HCPCS | Performed by: CLINICAL NURSE SPECIALIST

## 2018-11-07 PROCEDURE — 4040F PNEUMOC VAC/ADMIN/RCVD: CPT | Performed by: CLINICAL NURSE SPECIALIST

## 2018-11-07 PROCEDURE — 1101F PT FALLS ASSESS-DOCD LE1/YR: CPT | Performed by: CLINICAL NURSE SPECIALIST

## 2018-11-07 PROCEDURE — 99213 OFFICE O/P EST LOW 20 MIN: CPT | Performed by: CLINICAL NURSE SPECIALIST

## 2018-11-07 PROCEDURE — G8400 PT W/DXA NO RESULTS DOC: HCPCS | Performed by: CLINICAL NURSE SPECIALIST

## 2018-11-07 RX ORDER — LEFLUNOMIDE 20 MG/1
20 TABLET ORAL DAILY
Status: ON HOLD | COMMUNITY
End: 2018-12-10 | Stop reason: HOSPADM

## 2018-11-07 NOTE — PATIENT INSTRUCTIONS
Send [employer] home pacemaker reading on 2-7-19   Follow up in 6 mos With Dr. Linden Griffon   Call with any questions or concerns  Follow up with Amber Jacob MD for non cardiac problems  Report any new problems  Cardiovascular Fitness-Exercise as tolerated. Strive for 15 minutes of exercise most days of the week. Cardiac / Healthy Diet  Continue current medications as directed  Continue plan of treatment  It is always recommended that you bring your medications bottles with you to each visit - this is for your safety!

## 2018-11-14 ENCOUNTER — HOSPITAL ENCOUNTER (OUTPATIENT)
Dept: GENERAL RADIOLOGY | Facility: HOSPITAL | Age: 63
Discharge: HOME | End: 2018-11-14
Attending: ORTHOPAEDIC SURGERY | Admitting: ORTHOPAEDIC SURGERY

## 2018-11-14 ENCOUNTER — TRANSCRIBE ORDERS (OUTPATIENT)
Dept: LAB | Facility: HOSPITAL | Age: 63
End: 2018-11-14

## 2018-11-14 DIAGNOSIS — R06.02 SHORTNESS OF BREATH: ICD-10-CM

## 2018-11-14 DIAGNOSIS — R06.02 SHORTNESS OF BREATH: Primary | ICD-10-CM

## 2018-11-14 PROCEDURE — 71046 X-RAY EXAM CHEST 2 VIEWS: CPT

## 2018-11-27 LAB
FUNGUS (MYCOLOGY) CULTURE: NORMAL
KOH PREP: NORMAL

## 2018-12-04 DIAGNOSIS — I10 ESSENTIAL HYPERTENSION: ICD-10-CM

## 2018-12-04 RX ORDER — CARVEDILOL 12.5 MG/1
12.5 TABLET ORAL 2 TIMES DAILY
Qty: 180 TABLET | Refills: 3 | Status: SHIPPED | OUTPATIENT
Start: 2018-12-04 | End: 2019-03-22 | Stop reason: SDUPTHER

## 2018-12-05 ENCOUNTER — HOSPITAL ENCOUNTER (OUTPATIENT)
Dept: PREADMISSION TESTING | Age: 63
Discharge: HOME | DRG: 467 | End: 2018-12-09
Payer: MEDICARE

## 2018-12-05 VITALS — HEIGHT: 66 IN | WEIGHT: 190 LBS | BODY MASS INDEX: 30.53 KG/M2

## 2018-12-05 LAB
ABO/RH: NORMAL
ANION GAP SERPL CALCULATED.3IONS-SCNC: 17 MMOL/L (ref 7–19)
ANTIBODY SCREEN: NORMAL
APTT: 33.5 SEC (ref 26–36.2)
BASOPHILS ABSOLUTE: 0 K/UL (ref 0–0.2)
BASOPHILS RELATIVE PERCENT: 0.7 % (ref 0–1)
BUN BLDV-MCNC: 15 MG/DL (ref 8–23)
CALCIUM SERPL-MCNC: 9.5 MG/DL (ref 8.8–10.2)
CHLORIDE BLD-SCNC: 101 MMOL/L (ref 98–111)
CO2: 25 MMOL/L (ref 22–29)
CREAT SERPL-MCNC: 0.7 MG/DL (ref 0.5–0.9)
EKG P AXIS: 45 DEGREES
EKG P-R INTERVAL: 164 MS
EKG Q-T INTERVAL: 428 MS
EKG QRS DURATION: 90 MS
EKG QTC CALCULATION (BAZETT): 433 MS
EKG T AXIS: 41 DEGREES
EOSINOPHILS ABSOLUTE: 0.1 K/UL (ref 0–0.6)
EOSINOPHILS RELATIVE PERCENT: 1.9 % (ref 0–5)
GFR NON-AFRICAN AMERICAN: >60
GLUCOSE BLD-MCNC: 102 MG/DL (ref 74–109)
HCT VFR BLD CALC: 36.7 % (ref 37–47)
HEMOGLOBIN: 11.3 G/DL (ref 12–16)
INR BLD: 1.2 (ref 0.88–1.18)
LYMPHOCYTES ABSOLUTE: 1.7 K/UL (ref 1.1–4.5)
LYMPHOCYTES RELATIVE PERCENT: 30.5 % (ref 20–40)
MCH RBC QN AUTO: 28.6 PG (ref 27–31)
MCHC RBC AUTO-ENTMCNC: 30.8 G/DL (ref 33–37)
MCV RBC AUTO: 92.9 FL (ref 81–99)
MONOCYTES ABSOLUTE: 0.8 K/UL (ref 0–0.9)
MONOCYTES RELATIVE PERCENT: 14.7 % (ref 0–10)
NEUTROPHILS ABSOLUTE: 3 K/UL (ref 1.5–7.5)
NEUTROPHILS RELATIVE PERCENT: 51.8 % (ref 50–65)
PDW BLD-RTO: 15.7 % (ref 11.5–14.5)
PLATELET # BLD: 250 K/UL (ref 130–400)
PMV BLD AUTO: 10.6 FL (ref 9.4–12.3)
POTASSIUM SERPL-SCNC: 3.6 MMOL/L (ref 3.5–5)
PROTHROMBIN TIME: 15.2 SEC (ref 12–14.6)
RBC # BLD: 3.95 M/UL (ref 4.2–5.4)
SODIUM BLD-SCNC: 143 MMOL/L (ref 136–145)
WBC # BLD: 5.7 K/UL (ref 4.8–10.8)

## 2018-12-05 PROCEDURE — 86901 BLOOD TYPING SEROLOGIC RH(D): CPT

## 2018-12-05 PROCEDURE — 86850 RBC ANTIBODY SCREEN: CPT

## 2018-12-05 PROCEDURE — 85610 PROTHROMBIN TIME: CPT

## 2018-12-05 PROCEDURE — 93005 ELECTROCARDIOGRAM TRACING: CPT

## 2018-12-05 PROCEDURE — 86900 BLOOD TYPING SEROLOGIC ABO: CPT

## 2018-12-05 PROCEDURE — 85730 THROMBOPLASTIN TIME PARTIAL: CPT

## 2018-12-05 PROCEDURE — 85025 COMPLETE CBC W/AUTO DIFF WBC: CPT

## 2018-12-05 PROCEDURE — 87081 CULTURE SCREEN ONLY: CPT

## 2018-12-05 PROCEDURE — 80048 BASIC METABOLIC PNL TOTAL CA: CPT

## 2018-12-05 RX ORDER — M-VIT,TX,IRON,MINS/CALC/FOLIC 27MG-0.4MG
1 TABLET ORAL DAILY
COMMUNITY

## 2018-12-06 ENCOUNTER — HOSPITAL ENCOUNTER (INPATIENT)
Age: 63
LOS: 5 days | Discharge: HOME HEALTH | DRG: 467 | End: 2018-12-11
Attending: ORTHOPAEDIC SURGERY | Admitting: ORTHOPAEDIC SURGERY
Payer: MEDICARE

## 2018-12-06 ENCOUNTER — ANESTHESIA (OUTPATIENT)
Dept: OPERATING ROOM | Age: 63
DRG: 467 | End: 2018-12-06
Payer: MEDICARE

## 2018-12-06 ENCOUNTER — ANESTHESIA EVENT (OUTPATIENT)
Dept: OPERATING ROOM | Age: 63
DRG: 467 | End: 2018-12-06
Payer: MEDICARE

## 2018-12-06 ENCOUNTER — APPOINTMENT (OUTPATIENT)
Dept: GENERAL RADIOLOGY | Age: 63
DRG: 467 | End: 2018-12-06
Attending: ORTHOPAEDIC SURGERY
Payer: MEDICARE

## 2018-12-06 VITALS
DIASTOLIC BLOOD PRESSURE: 62 MMHG | RESPIRATION RATE: 13 BRPM | OXYGEN SATURATION: 100 % | SYSTOLIC BLOOD PRESSURE: 123 MMHG | TEMPERATURE: 97.7 F

## 2018-12-06 DIAGNOSIS — T84.53XD INFECTION OF TOTAL RIGHT KNEE REPLACEMENT, SUBSEQUENT ENCOUNTER: Primary | ICD-10-CM

## 2018-12-06 PROBLEM — T84.53XA INFECTION OF TOTAL RIGHT KNEE REPLACEMENT (HCC): Status: ACTIVE | Noted: 2018-12-06

## 2018-12-06 LAB — MRSA CULTURE ONLY: NORMAL

## 2018-12-06 PROCEDURE — C1776 JOINT DEVICE (IMPLANTABLE): HCPCS | Performed by: ORTHOPAEDIC SURGERY

## 2018-12-06 PROCEDURE — 87077 CULTURE AEROBIC IDENTIFY: CPT

## 2018-12-06 PROCEDURE — 7100000001 HC PACU RECOVERY - ADDTL 15 MIN: Performed by: ORTHOPAEDIC SURGERY

## 2018-12-06 PROCEDURE — 2580000003 HC RX 258: Performed by: ORTHOPAEDIC SURGERY

## 2018-12-06 PROCEDURE — 3700000001 HC ADD 15 MINUTES (ANESTHESIA): Performed by: ORTHOPAEDIC SURGERY

## 2018-12-06 PROCEDURE — 2500000003 HC RX 250 WO HCPCS: Performed by: ORTHOPAEDIC SURGERY

## 2018-12-06 PROCEDURE — 2500000003 HC RX 250 WO HCPCS: Performed by: NURSE ANESTHETIST, CERTIFIED REGISTERED

## 2018-12-06 PROCEDURE — 87070 CULTURE OTHR SPECIMN AEROBIC: CPT

## 2018-12-06 PROCEDURE — C1763 CONN TISS, NON-HUMAN: HCPCS | Performed by: ORTHOPAEDIC SURGERY

## 2018-12-06 PROCEDURE — 3600000005 HC SURGERY LEVEL 5 BASE: Performed by: ORTHOPAEDIC SURGERY

## 2018-12-06 PROCEDURE — 6360000002 HC RX W HCPCS: Performed by: NURSE ANESTHETIST, CERTIFIED REGISTERED

## 2018-12-06 PROCEDURE — 3600000015 HC SURGERY LEVEL 5 ADDTL 15MIN: Performed by: ORTHOPAEDIC SURGERY

## 2018-12-06 PROCEDURE — 6360000002 HC RX W HCPCS: Performed by: ORTHOPAEDIC SURGERY

## 2018-12-06 PROCEDURE — 0SRC0EZ REPLACEMENT OF RIGHT KNEE JOINT WITH ARTICULATING SPACER, OPEN APPROACH: ICD-10-PCS | Performed by: ORTHOPAEDIC SURGERY

## 2018-12-06 PROCEDURE — 0SPC0JZ REMOVAL OF SYNTHETIC SUBSTITUTE FROM RIGHT KNEE JOINT, OPEN APPROACH: ICD-10-PCS | Performed by: ORTHOPAEDIC SURGERY

## 2018-12-06 PROCEDURE — 87102 FUNGUS ISOLATION CULTURE: CPT

## 2018-12-06 PROCEDURE — C1713 ANCHOR/SCREW BN/BN,TIS/BN: HCPCS | Performed by: ORTHOPAEDIC SURGERY

## 2018-12-06 PROCEDURE — 87176 TISSUE HOMOGENIZATION CULTR: CPT

## 2018-12-06 PROCEDURE — 94664 DEMO&/EVAL PT USE INHALER: CPT

## 2018-12-06 PROCEDURE — 1210000000 HC MED SURG R&B

## 2018-12-06 PROCEDURE — 6370000000 HC RX 637 (ALT 250 FOR IP): Performed by: ORTHOPAEDIC SURGERY

## 2018-12-06 PROCEDURE — 2720000010 HC SURG SUPPLY STERILE: Performed by: ORTHOPAEDIC SURGERY

## 2018-12-06 PROCEDURE — 3700000000 HC ANESTHESIA ATTENDED CARE: Performed by: ORTHOPAEDIC SURGERY

## 2018-12-06 PROCEDURE — 87186 SC STD MICRODIL/AGAR DIL: CPT

## 2018-12-06 PROCEDURE — 2709999900 HC NON-CHARGEABLE SUPPLY: Performed by: ORTHOPAEDIC SURGERY

## 2018-12-06 PROCEDURE — 7100000000 HC PACU RECOVERY - FIRST 15 MIN: Performed by: ORTHOPAEDIC SURGERY

## 2018-12-06 PROCEDURE — 87205 SMEAR GRAM STAIN: CPT

## 2018-12-06 PROCEDURE — 73560 X-RAY EXAM OF KNEE 1 OR 2: CPT

## 2018-12-06 DEVICE — IMPLANTABLE DEVICE: Type: IMPLANTABLE DEVICE | Site: KNEE | Status: FUNCTIONAL

## 2018-12-06 DEVICE — CEMENT BONE 40GM HI VISC PALACOS R: Type: IMPLANTABLE DEVICE | Site: KNEE | Status: FUNCTIONAL

## 2018-12-06 RX ORDER — M-VIT,TX,IRON,MINS/CALC/FOLIC 27MG-0.4MG
1 TABLET ORAL DAILY
Status: DISCONTINUED | OUTPATIENT
Start: 2018-12-06 | End: 2018-12-11 | Stop reason: HOSPADM

## 2018-12-06 RX ORDER — PREGABALIN 50 MG/1
50 CAPSULE ORAL DAILY
Status: DISCONTINUED | OUTPATIENT
Start: 2018-12-06 | End: 2018-12-11 | Stop reason: HOSPADM

## 2018-12-06 RX ORDER — MORPHINE SULFATE 2 MG/ML
2 INJECTION, SOLUTION INTRAMUSCULAR; INTRAVENOUS EVERY 5 MIN PRN
Status: DISCONTINUED | OUTPATIENT
Start: 2018-12-06 | End: 2018-12-06 | Stop reason: HOSPADM

## 2018-12-06 RX ORDER — TRAMADOL HYDROCHLORIDE 50 MG/1
50 TABLET ORAL 2 TIMES DAILY
Status: DISCONTINUED | OUTPATIENT
Start: 2018-12-06 | End: 2018-12-11 | Stop reason: HOSPADM

## 2018-12-06 RX ORDER — TOBRAMYCIN 1.2 G/30ML
INJECTION, POWDER, LYOPHILIZED, FOR SOLUTION INTRAVENOUS PRN
Status: DISCONTINUED | OUTPATIENT
Start: 2018-12-06 | End: 2018-12-06 | Stop reason: HOSPADM

## 2018-12-06 RX ORDER — ISOSORBIDE MONONITRATE 30 MG/1
30 TABLET, EXTENDED RELEASE ORAL DAILY
Status: DISCONTINUED | OUTPATIENT
Start: 2018-12-06 | End: 2018-12-11 | Stop reason: HOSPADM

## 2018-12-06 RX ORDER — CARVEDILOL 12.5 MG/1
12.5 TABLET ORAL 2 TIMES DAILY
Status: DISCONTINUED | OUTPATIENT
Start: 2018-12-06 | End: 2018-12-11 | Stop reason: HOSPADM

## 2018-12-06 RX ORDER — FENTANYL CITRATE 50 UG/ML
25 INJECTION, SOLUTION INTRAMUSCULAR; INTRAVENOUS
Status: DISCONTINUED | OUTPATIENT
Start: 2018-12-06 | End: 2018-12-06 | Stop reason: HOSPADM

## 2018-12-06 RX ORDER — SODIUM CHLORIDE 0.9 % (FLUSH) 0.9 %
10 SYRINGE (ML) INJECTION EVERY 12 HOURS SCHEDULED
Status: DISCONTINUED | OUTPATIENT
Start: 2018-12-06 | End: 2018-12-11 | Stop reason: SDUPTHER

## 2018-12-06 RX ORDER — DEXAMETHASONE SODIUM PHOSPHATE 10 MG/ML
INJECTION INTRAMUSCULAR; INTRAVENOUS PRN
Status: DISCONTINUED | OUTPATIENT
Start: 2018-12-06 | End: 2018-12-06 | Stop reason: SDUPTHER

## 2018-12-06 RX ORDER — ACETAMINOPHEN 500 MG
1000 TABLET ORAL ONCE
Status: COMPLETED | OUTPATIENT
Start: 2018-12-06 | End: 2018-12-06

## 2018-12-06 RX ORDER — FOLIC ACID 1 MG/1
1 TABLET ORAL DAILY
Status: DISCONTINUED | OUTPATIENT
Start: 2018-12-06 | End: 2018-12-11 | Stop reason: HOSPADM

## 2018-12-06 RX ORDER — DOCUSATE SODIUM 100 MG/1
100 CAPSULE, LIQUID FILLED ORAL 2 TIMES DAILY
Status: DISCONTINUED | OUTPATIENT
Start: 2018-12-06 | End: 2018-12-11 | Stop reason: HOSPADM

## 2018-12-06 RX ORDER — MIDAZOLAM HYDROCHLORIDE 1 MG/ML
INJECTION INTRAMUSCULAR; INTRAVENOUS PRN
Status: DISCONTINUED | OUTPATIENT
Start: 2018-12-06 | End: 2018-12-06 | Stop reason: SDUPTHER

## 2018-12-06 RX ORDER — SODIUM CHLORIDE, SODIUM LACTATE, POTASSIUM CHLORIDE, CALCIUM CHLORIDE 600; 310; 30; 20 MG/100ML; MG/100ML; MG/100ML; MG/100ML
INJECTION, SOLUTION INTRAVENOUS CONTINUOUS
Status: DISCONTINUED | OUTPATIENT
Start: 2018-12-06 | End: 2018-12-09

## 2018-12-06 RX ORDER — VALACYCLOVIR HYDROCHLORIDE 500 MG/1
500 TABLET, FILM COATED ORAL DAILY
Status: DISCONTINUED | OUTPATIENT
Start: 2018-12-06 | End: 2018-12-11 | Stop reason: HOSPADM

## 2018-12-06 RX ORDER — OXYCODONE HYDROCHLORIDE AND ACETAMINOPHEN 5; 325 MG/1; MG/1
1 TABLET ORAL EVERY 4 HOURS PRN
Status: DISCONTINUED | OUTPATIENT
Start: 2018-12-06 | End: 2018-12-11 | Stop reason: HOSPADM

## 2018-12-06 RX ORDER — ACETAMINOPHEN 325 MG/1
650 TABLET ORAL EVERY 4 HOURS PRN
Status: DISCONTINUED | OUTPATIENT
Start: 2018-12-06 | End: 2018-12-11 | Stop reason: HOSPADM

## 2018-12-06 RX ORDER — TRANEXAMIC ACID 650 1/1
1950 TABLET ORAL
Status: COMPLETED | OUTPATIENT
Start: 2018-12-06 | End: 2018-12-06

## 2018-12-06 RX ORDER — MORPHINE SULFATE 2 MG/ML
4 INJECTION, SOLUTION INTRAMUSCULAR; INTRAVENOUS EVERY 5 MIN PRN
Status: DISCONTINUED | OUTPATIENT
Start: 2018-12-06 | End: 2018-12-06 | Stop reason: HOSPADM

## 2018-12-06 RX ORDER — MEROPENEM 1 G/1
4 INJECTION, POWDER, FOR SOLUTION INTRAVENOUS ONCE
Status: DISCONTINUED | OUTPATIENT
Start: 2018-12-06 | End: 2018-12-10

## 2018-12-06 RX ORDER — SALSALATE 500 MG
500 TABLET ORAL 2 TIMES DAILY
Status: DISCONTINUED | OUTPATIENT
Start: 2018-12-06 | End: 2018-12-07

## 2018-12-06 RX ORDER — FLUTICASONE PROPIONATE 50 MCG
2 SPRAY, SUSPENSION (ML) NASAL DAILY
Status: DISCONTINUED | OUTPATIENT
Start: 2018-12-06 | End: 2018-12-11 | Stop reason: HOSPADM

## 2018-12-06 RX ORDER — OXYCODONE HCL 10 MG/1
10 TABLET, FILM COATED, EXTENDED RELEASE ORAL
Status: COMPLETED | OUTPATIENT
Start: 2018-12-06 | End: 2018-12-06

## 2018-12-06 RX ORDER — MIDAZOLAM HYDROCHLORIDE 1 MG/ML
2 INJECTION INTRAMUSCULAR; INTRAVENOUS
Status: DISCONTINUED | OUTPATIENT
Start: 2018-12-06 | End: 2018-12-06 | Stop reason: HOSPADM

## 2018-12-06 RX ORDER — SODIUM CHLORIDE 0.9 % (FLUSH) 0.9 %
10 SYRINGE (ML) INJECTION EVERY 12 HOURS SCHEDULED
Status: DISCONTINUED | OUTPATIENT
Start: 2018-12-06 | End: 2018-12-11 | Stop reason: HOSPADM

## 2018-12-06 RX ORDER — SODIUM CHLORIDE 0.9 % (FLUSH) 0.9 %
10 SYRINGE (ML) INJECTION EVERY 12 HOURS SCHEDULED
Status: DISCONTINUED | OUTPATIENT
Start: 2018-12-06 | End: 2018-12-06 | Stop reason: HOSPADM

## 2018-12-06 RX ORDER — ENALAPRILAT 2.5 MG/2ML
1.25 INJECTION INTRAVENOUS
Status: DISCONTINUED | OUTPATIENT
Start: 2018-12-06 | End: 2018-12-06 | Stop reason: HOSPADM

## 2018-12-06 RX ORDER — LIDOCAINE HYDROCHLORIDE 10 MG/ML
5 INJECTION, SOLUTION EPIDURAL; INFILTRATION; INTRACAUDAL; PERINEURAL ONCE
Status: DISCONTINUED | OUTPATIENT
Start: 2018-12-06 | End: 2018-12-11 | Stop reason: HOSPADM

## 2018-12-06 RX ORDER — PANTOPRAZOLE SODIUM 40 MG/1
40 TABLET, DELAYED RELEASE ORAL DAILY
Status: DISCONTINUED | OUTPATIENT
Start: 2018-12-06 | End: 2018-12-11 | Stop reason: HOSPADM

## 2018-12-06 RX ORDER — ONDANSETRON 2 MG/ML
4 INJECTION INTRAMUSCULAR; INTRAVENOUS EVERY 6 HOURS PRN
Status: DISCONTINUED | OUTPATIENT
Start: 2018-12-06 | End: 2018-12-11 | Stop reason: HOSPADM

## 2018-12-06 RX ORDER — VANCOMYCIN HYDROCHLORIDE 1 G/20ML
INJECTION, POWDER, LYOPHILIZED, FOR SOLUTION INTRAVENOUS PRN
Status: DISCONTINUED | OUTPATIENT
Start: 2018-12-06 | End: 2018-12-06 | Stop reason: SDUPTHER

## 2018-12-06 RX ORDER — LIDOCAINE HYDROCHLORIDE 10 MG/ML
1 INJECTION, SOLUTION EPIDURAL; INFILTRATION; INTRACAUDAL; PERINEURAL
Status: DISCONTINUED | OUTPATIENT
Start: 2018-12-06 | End: 2018-12-06 | Stop reason: HOSPADM

## 2018-12-06 RX ORDER — PROPOFOL 10 MG/ML
INJECTION, EMULSION INTRAVENOUS PRN
Status: DISCONTINUED | OUTPATIENT
Start: 2018-12-06 | End: 2018-12-06 | Stop reason: SDUPTHER

## 2018-12-06 RX ORDER — SODIUM CHLORIDE 0.9 % (FLUSH) 0.9 %
10 SYRINGE (ML) INJECTION PRN
Status: DISCONTINUED | OUTPATIENT
Start: 2018-12-06 | End: 2018-12-06 | Stop reason: HOSPADM

## 2018-12-06 RX ORDER — ACETAMINOPHEN 325 MG/1
325 TABLET ORAL 2 TIMES DAILY
Status: DISCONTINUED | OUTPATIENT
Start: 2018-12-06 | End: 2018-12-11 | Stop reason: HOSPADM

## 2018-12-06 RX ORDER — FENTANYL CITRATE 50 UG/ML
INJECTION, SOLUTION INTRAMUSCULAR; INTRAVENOUS PRN
Status: DISCONTINUED | OUTPATIENT
Start: 2018-12-06 | End: 2018-12-06 | Stop reason: SDUPTHER

## 2018-12-06 RX ORDER — SODIUM CHLORIDE, SODIUM LACTATE, POTASSIUM CHLORIDE, CALCIUM CHLORIDE 600; 310; 30; 20 MG/100ML; MG/100ML; MG/100ML; MG/100ML
INJECTION, SOLUTION INTRAVENOUS CONTINUOUS
Status: DISCONTINUED | OUTPATIENT
Start: 2018-12-06 | End: 2018-12-06

## 2018-12-06 RX ORDER — NITROGLYCERIN 0.4 MG/1
0.4 TABLET SUBLINGUAL EVERY 5 MIN PRN
Status: DISCONTINUED | OUTPATIENT
Start: 2018-12-06 | End: 2018-12-11 | Stop reason: HOSPADM

## 2018-12-06 RX ORDER — SODIUM CHLORIDE 0.9 % (FLUSH) 0.9 %
10 SYRINGE (ML) INJECTION PRN
Status: DISCONTINUED | OUTPATIENT
Start: 2018-12-06 | End: 2018-12-11 | Stop reason: HOSPADM

## 2018-12-06 RX ORDER — MEPERIDINE HYDROCHLORIDE 50 MG/ML
12.5 INJECTION INTRAMUSCULAR; INTRAVENOUS; SUBCUTANEOUS EVERY 5 MIN PRN
Status: DISCONTINUED | OUTPATIENT
Start: 2018-12-06 | End: 2018-12-06 | Stop reason: HOSPADM

## 2018-12-06 RX ORDER — PREDNISONE 1 MG/1
2 TABLET ORAL DAILY
Status: DISCONTINUED | OUTPATIENT
Start: 2018-12-06 | End: 2018-12-11 | Stop reason: HOSPADM

## 2018-12-06 RX ORDER — ONDANSETRON 2 MG/ML
INJECTION INTRAMUSCULAR; INTRAVENOUS PRN
Status: DISCONTINUED | OUTPATIENT
Start: 2018-12-06 | End: 2018-12-06 | Stop reason: SDUPTHER

## 2018-12-06 RX ORDER — CETIRIZINE HYDROCHLORIDE 10 MG/1
10 TABLET ORAL DAILY
Status: DISCONTINUED | OUTPATIENT
Start: 2018-12-06 | End: 2018-12-11 | Stop reason: HOSPADM

## 2018-12-06 RX ORDER — SPIRONOLACTONE 25 MG/1
25 TABLET ORAL DAILY
Status: DISCONTINUED | OUTPATIENT
Start: 2018-12-06 | End: 2018-12-11 | Stop reason: HOSPADM

## 2018-12-06 RX ORDER — DIPHENHYDRAMINE HYDROCHLORIDE 50 MG/ML
12.5 INJECTION INTRAMUSCULAR; INTRAVENOUS
Status: DISCONTINUED | OUTPATIENT
Start: 2018-12-06 | End: 2018-12-06 | Stop reason: HOSPADM

## 2018-12-06 RX ORDER — MEROPENEM 1 G/1
INJECTION, POWDER, FOR SOLUTION INTRAVENOUS PRN
Status: DISCONTINUED | OUTPATIENT
Start: 2018-12-06 | End: 2018-12-06 | Stop reason: SDUPTHER

## 2018-12-06 RX ORDER — SCOLOPAMINE TRANSDERMAL SYSTEM 1 MG/1
1 PATCH, EXTENDED RELEASE TRANSDERMAL ONCE
Status: DISCONTINUED | OUTPATIENT
Start: 2018-12-06 | End: 2018-12-09

## 2018-12-06 RX ORDER — FENTANYL CITRATE 50 UG/ML
50 INJECTION, SOLUTION INTRAMUSCULAR; INTRAVENOUS
Status: DISCONTINUED | OUTPATIENT
Start: 2018-12-06 | End: 2018-12-06 | Stop reason: HOSPADM

## 2018-12-06 RX ORDER — 0.9 % SODIUM CHLORIDE 0.9 %
500 INTRAVENOUS SOLUTION INTRAVENOUS PRN
Status: DISCONTINUED | OUTPATIENT
Start: 2018-12-06 | End: 2018-12-11 | Stop reason: HOSPADM

## 2018-12-06 RX ORDER — OXYCODONE HYDROCHLORIDE AND ACETAMINOPHEN 5; 325 MG/1; MG/1
2 TABLET ORAL EVERY 4 HOURS PRN
Status: DISCONTINUED | OUTPATIENT
Start: 2018-12-06 | End: 2018-12-11 | Stop reason: HOSPADM

## 2018-12-06 RX ORDER — METOCLOPRAMIDE HYDROCHLORIDE 5 MG/ML
10 INJECTION INTRAMUSCULAR; INTRAVENOUS
Status: DISCONTINUED | OUTPATIENT
Start: 2018-12-06 | End: 2018-12-06 | Stop reason: HOSPADM

## 2018-12-06 RX ORDER — LIDOCAINE HYDROCHLORIDE 10 MG/ML
INJECTION, SOLUTION EPIDURAL; INFILTRATION; INTRACAUDAL; PERINEURAL PRN
Status: DISCONTINUED | OUTPATIENT
Start: 2018-12-06 | End: 2018-12-06 | Stop reason: SDUPTHER

## 2018-12-06 RX ORDER — ROCURONIUM BROMIDE 10 MG/ML
INJECTION, SOLUTION INTRAVENOUS PRN
Status: DISCONTINUED | OUTPATIENT
Start: 2018-12-06 | End: 2018-12-06 | Stop reason: SDUPTHER

## 2018-12-06 RX ORDER — VANCOMYCIN HYDROCHLORIDE 1 G/20ML
INJECTION, POWDER, LYOPHILIZED, FOR SOLUTION INTRAVENOUS PRN
Status: DISCONTINUED | OUTPATIENT
Start: 2018-12-06 | End: 2018-12-06 | Stop reason: HOSPADM

## 2018-12-06 RX ORDER — SODIUM CHLORIDE 9 MG/ML
INJECTION, SOLUTION INTRAVENOUS CONTINUOUS
Status: DISCONTINUED | OUTPATIENT
Start: 2018-12-06 | End: 2018-12-06

## 2018-12-06 RX ORDER — DOCUSATE SODIUM 100 MG/1
100 CAPSULE, LIQUID FILLED ORAL DAILY
Status: DISCONTINUED | OUTPATIENT
Start: 2018-12-06 | End: 2018-12-11 | Stop reason: SDUPTHER

## 2018-12-06 RX ORDER — MORPHINE SULFATE 2 MG/ML
2 INJECTION, SOLUTION INTRAMUSCULAR; INTRAVENOUS
Status: DISCONTINUED | OUTPATIENT
Start: 2018-12-06 | End: 2018-12-11 | Stop reason: HOSPADM

## 2018-12-06 RX ORDER — SODIUM CHLORIDE 0.9 % (FLUSH) 0.9 %
10 SYRINGE (ML) INJECTION PRN
Status: DISCONTINUED | OUTPATIENT
Start: 2018-12-06 | End: 2018-12-11 | Stop reason: SDUPTHER

## 2018-12-06 RX ORDER — LEVOTHYROXINE SODIUM 0.07 MG/1
75 TABLET ORAL DAILY
Status: DISCONTINUED | OUTPATIENT
Start: 2018-12-07 | End: 2018-12-11 | Stop reason: HOSPADM

## 2018-12-06 RX ORDER — LABETALOL HYDROCHLORIDE 5 MG/ML
5 INJECTION, SOLUTION INTRAVENOUS EVERY 10 MIN PRN
Status: DISCONTINUED | OUTPATIENT
Start: 2018-12-06 | End: 2018-12-06 | Stop reason: HOSPADM

## 2018-12-06 RX ORDER — HYDRALAZINE HYDROCHLORIDE 20 MG/ML
5 INJECTION INTRAMUSCULAR; INTRAVENOUS EVERY 10 MIN PRN
Status: DISCONTINUED | OUTPATIENT
Start: 2018-12-06 | End: 2018-12-06 | Stop reason: HOSPADM

## 2018-12-06 RX ORDER — ERGOCALCIFEROL 1.25 MG/1
50000 CAPSULE ORAL WEEKLY
Status: DISCONTINUED | OUTPATIENT
Start: 2018-12-07 | End: 2018-12-11 | Stop reason: HOSPADM

## 2018-12-06 RX ORDER — PROMETHAZINE HYDROCHLORIDE 25 MG/ML
6.25 INJECTION, SOLUTION INTRAMUSCULAR; INTRAVENOUS
Status: DISCONTINUED | OUTPATIENT
Start: 2018-12-06 | End: 2018-12-06 | Stop reason: HOSPADM

## 2018-12-06 RX ORDER — MORPHINE SULFATE 2 MG/ML
4 INJECTION, SOLUTION INTRAMUSCULAR; INTRAVENOUS
Status: DISCONTINUED | OUTPATIENT
Start: 2018-12-06 | End: 2018-12-11 | Stop reason: HOSPADM

## 2018-12-06 RX ORDER — BISACODYL 10 MG
10 SUPPOSITORY, RECTAL RECTAL DAILY PRN
Status: DISCONTINUED | OUTPATIENT
Start: 2018-12-06 | End: 2018-12-11 | Stop reason: HOSPADM

## 2018-12-06 RX ADMIN — ONDANSETRON HYDROCHLORIDE 4 MG: 2 INJECTION, SOLUTION INTRAMUSCULAR; INTRAVENOUS at 18:30

## 2018-12-06 RX ADMIN — ROCURONIUM BROMIDE 30 MG: 10 INJECTION INTRAVENOUS at 15:29

## 2018-12-06 RX ADMIN — FENTANYL CITRATE 50 MCG: 50 INJECTION INTRAMUSCULAR; INTRAVENOUS at 16:30

## 2018-12-06 RX ADMIN — MORPHINE SULFATE 4 MG: 2 INJECTION, SOLUTION INTRAMUSCULAR; INTRAVENOUS at 20:13

## 2018-12-06 RX ADMIN — Medication 10 ML: at 20:19

## 2018-12-06 RX ADMIN — FENTANYL CITRATE 50 MCG: 50 INJECTION INTRAMUSCULAR; INTRAVENOUS at 16:00

## 2018-12-06 RX ADMIN — ACETAMINOPHEN 1000 MG: 500 TABLET, FILM COATED ORAL at 13:57

## 2018-12-06 RX ADMIN — CETIRIZINE HYDROCHLORIDE 10 MG: 10 TABLET, FILM COATED ORAL at 21:36

## 2018-12-06 RX ADMIN — ISOSORBIDE MONONITRATE 30 MG: 30 TABLET, EXTENDED RELEASE ORAL at 21:36

## 2018-12-06 RX ADMIN — MIDAZOLAM 2 MG: 1 INJECTION INTRAMUSCULAR; INTRAVENOUS at 15:23

## 2018-12-06 RX ADMIN — FOLIC ACID 1 MG: 1 TABLET ORAL at 21:36

## 2018-12-06 RX ADMIN — VANCOMYCIN HYDROCHLORIDE 1000 MG: 1 INJECTION, POWDER, LYOPHILIZED, FOR SOLUTION INTRAVENOUS at 16:07

## 2018-12-06 RX ADMIN — FENTANYL CITRATE 50 MCG: 50 INJECTION INTRAMUSCULAR; INTRAVENOUS at 15:28

## 2018-12-06 RX ADMIN — PREGABALIN 50 MG: 50 CAPSULE ORAL at 21:35

## 2018-12-06 RX ADMIN — Medication 2 G: at 21:51

## 2018-12-06 RX ADMIN — VALACYCLOVIR HYDROCHLORIDE 500 MG: 500 TABLET, FILM COATED ORAL at 21:36

## 2018-12-06 RX ADMIN — FENTANYL CITRATE 50 MCG: 50 INJECTION INTRAMUSCULAR; INTRAVENOUS at 18:34

## 2018-12-06 RX ADMIN — PANTOPRAZOLE SODIUM 40 MG: 40 TABLET, DELAYED RELEASE ORAL at 21:35

## 2018-12-06 RX ADMIN — SPIRONOLACTONE 25 MG: 25 TABLET ORAL at 21:36

## 2018-12-06 RX ADMIN — HYDROMORPHONE HYDROCHLORIDE 1 MG: 1 INJECTION, SOLUTION INTRAMUSCULAR; INTRAVENOUS; SUBCUTANEOUS at 18:39

## 2018-12-06 RX ADMIN — ROCURONIUM BROMIDE 20 MG: 10 INJECTION INTRAVENOUS at 15:55

## 2018-12-06 RX ADMIN — TRANEXAMIC ACID 1950 MG: 650 TABLET ORAL at 13:58

## 2018-12-06 RX ADMIN — PREDNISONE 2 MG: 1 TABLET ORAL at 23:01

## 2018-12-06 RX ADMIN — MULTIPLE VITAMINS W/ MINERALS TAB 1 TABLET: TAB at 21:35

## 2018-12-06 RX ADMIN — FENTANYL CITRATE 50 MCG: 50 INJECTION INTRAMUSCULAR; INTRAVENOUS at 16:16

## 2018-12-06 RX ADMIN — FENTANYL CITRATE 50 MCG: 50 INJECTION INTRAMUSCULAR; INTRAVENOUS at 15:55

## 2018-12-06 RX ADMIN — ROCURONIUM BROMIDE 20 MG: 10 INJECTION INTRAVENOUS at 17:34

## 2018-12-06 RX ADMIN — MEROPENEM 1 G: 1 INJECTION, POWDER, FOR SOLUTION INTRAVENOUS at 16:05

## 2018-12-06 RX ADMIN — FLUTICASONE PROPIONATE 2 SPRAY: 50 SPRAY, METERED NASAL at 21:36

## 2018-12-06 RX ADMIN — TRAMADOL HYDROCHLORIDE 50 MG: 50 TABLET, FILM COATED ORAL at 21:37

## 2018-12-06 RX ADMIN — OXYCODONE AND ACETAMINOPHEN 1 TABLET: 5; 325 TABLET ORAL at 23:00

## 2018-12-06 RX ADMIN — PROPOFOL 150 MG: 10 INJECTION, EMULSION INTRAVENOUS at 15:29

## 2018-12-06 RX ADMIN — LIDOCAINE HYDROCHLORIDE 50 MG: 10 INJECTION, SOLUTION EPIDURAL; INFILTRATION; INTRACAUDAL; PERINEURAL at 15:29

## 2018-12-06 RX ADMIN — SUGAMMADEX 300 MG: 100 INJECTION, SOLUTION INTRAVENOUS at 18:35

## 2018-12-06 RX ADMIN — SODIUM CHLORIDE, SODIUM LACTATE, POTASSIUM CHLORIDE, AND CALCIUM CHLORIDE: 600; 310; 30; 20 INJECTION, SOLUTION INTRAVENOUS at 15:49

## 2018-12-06 RX ADMIN — CARVEDILOL 12.5 MG: 12.5 TABLET, FILM COATED ORAL at 21:36

## 2018-12-06 RX ADMIN — SODIUM CHLORIDE, SODIUM LACTATE, POTASSIUM CHLORIDE, AND CALCIUM CHLORIDE: 600; 310; 30; 20 INJECTION, SOLUTION INTRAVENOUS at 13:57

## 2018-12-06 RX ADMIN — OXYCODONE HYDROCHLORIDE 10 MG: 10 TABLET, FILM COATED, EXTENDED RELEASE ORAL at 13:58

## 2018-12-06 RX ADMIN — DEXAMETHASONE SODIUM PHOSPHATE 10 MG: 10 INJECTION INTRAMUSCULAR; INTRAVENOUS at 15:39

## 2018-12-06 RX ADMIN — FENTANYL CITRATE 50 MCG: 50 INJECTION INTRAMUSCULAR; INTRAVENOUS at 17:34

## 2018-12-06 RX ADMIN — SODIUM CHLORIDE, POTASSIUM CHLORIDE, SODIUM LACTATE AND CALCIUM CHLORIDE: 600; 310; 30; 20 INJECTION, SOLUTION INTRAVENOUS at 20:17

## 2018-12-06 RX ADMIN — ACETAMINOPHEN 325 MG: 325 TABLET, FILM COATED ORAL at 21:37

## 2018-12-06 ASSESSMENT — PAIN SCALES - GENERAL
PAINLEVEL_OUTOF10: 8
PAINLEVEL_OUTOF10: 5
PAINLEVEL_OUTOF10: 4
PAINLEVEL_OUTOF10: 5

## 2018-12-06 ASSESSMENT — LIFESTYLE VARIABLES: SMOKING_STATUS: 0

## 2018-12-06 ASSESSMENT — ENCOUNTER SYMPTOMS: SHORTNESS OF BREATH: 0

## 2018-12-06 NOTE — ANESTHESIA PRE PROCEDURE
Department of Anesthesiology  Preprocedure Note       Name:  Joselin Vogel   Age:  70 y.o.  :  1955                                          MRN:  [id number]         Date:  2018      Surgeon: Kristina Lancaster):  Ryland Preciado MD    Procedure: Procedure(s):  KNEE EXPLANT AND PLACEMENT OF ANTIBIOTIC SPACER (Right )    Medications prior to admission:   Prior to Admission medications    Medication Sig Start Date End Date Taking? Authorizing Provider   Multiple Vitamins-Minerals (THERAPEUTIC MULTIVITAMIN-MINERALS) tablet Take 1 tablet by mouth daily    Historical Provider, MD   denosumab (PROLIA) 60 MG/ML SOLN SC injection Inject 60 mg into the skin once    Historical Provider, MD   carvedilol (COREG) 12.5 MG tablet Take 1 tablet by mouth 2 times daily  Patient taking differently: Take 12.5 mg by mouth daily  18   MELISSA Ross   adalimumab (HUMIRA) 40 MG/0.8ML injection Inject 40 mg into the skin every other day Once every 10 days     Historical Provider, MD   leflunomide (ARAVA) 20 MG tablet Take 20 mg by mouth daily    Historical Provider, MD   docusate sodium (COLACE) 100 MG capsule Take 1 capsule by mouth daily To prevent constipation 18   Ryland Preciado MD   isosorbide mononitrate (IMDUR) 30 MG extended release tablet Take 30 mg by mouth daily    Historical Provider, MD   predniSONE (DELTASONE) 1 MG tablet Take 2 mg by mouth daily    Historical Provider, MD   traMADol-acetaminophen (ULTRACET) 37.5-325 MG per tablet Take 1 tablet by mouth 2 times daily. Esme Heard     Historical Provider, MD   salsalate (DISALCID) 500 MG tablet Take 500 mg by mouth 2 times daily TAKE 5 PILLS DAILY Monday, Wednesday, Friday AND   TAKE 4 PILLS Tuesday, Thursday AND SATURDAY     Historical Provider, MD   nitroGLYCERIN (NITROSTAT) 0.4 MG SL tablet Place 1 tablet under the tongue every 5 minutes as needed for Chest pain 18   MELISSA Johnson   clopidogrel (PLAVIX) 75 MG tablet Take 1 tablet by 2018    INR 1.20 2018    APTT 33.5 2018       HCG (If Applicable): No results found for: PREGTESTUR, PREGSERUM, HCG, HCGQUANT     ABGs: No results found for: PHART, PO2ART, JGO1CFO, MTO3RWN, BEART, M0KXQTHV     Type & Screen (If Applicable):  No results found for: LABABO, [address]    Anesthesia Evaluation  Patient summary reviewed and Nursing notes reviewed no history of anesthetic complications:   Airway: Mallampati: II  TM distance: >3 FB   Neck ROM: full  Mouth opening: > = 3 FB Dental:          Pulmonary:normal exam    (+) asthma:     (-) shortness of breath and not a current smoker          Patient did not smoke on day of surgery. Cardiovascular:  Exercise tolerance: good (>4 METS),   (+) hypertension:, pacemaker (Medtronic): pacemaker, CAD:, CABG/stent (PTCA with Stent / Stress neg ):, dysrhythmias:,     (-) valvular problems/murmurs and past MI    ECG reviewed               Beta Blocker:  Dose within 24 Hrs      ROS comment: EK BPM  Demand atrial pacing  Left anterior fascicular block  Poor R wave progression - probable normal variant  Comparison Summary: Descriptive differences only  Summary: Abnormal ECG     Neuro/Psych:   Negative Neuro/Psych ROS              GI/Hepatic/Renal:   (+) GERD: well controlled,      (-) liver disease and no renal disease       Endo/Other:    (+) blood dyscrasia (No plavix x 1 week): anticoagulation therapy:., no malignancy/cancer. (-) diabetes mellitus, no malignancy/cancer               Abdominal:           Vascular:   + PVD, aortic or cerebral, DVT (25-30 years ago, no problems since), .  - PE. Anesthesia Plan      general     ASA 3     (Pt requests GETA. )  Induction: intravenous. BIS  MIPS: Postoperative opioids intended and Prophylactic antiemetics administered. Anesthetic plan and risks discussed with patient. Use of blood products discussed with patient whom.    Plan discussed with

## 2018-12-07 LAB
ANION GAP SERPL CALCULATED.3IONS-SCNC: 6 MMOL/L (ref 7–19)
BUN BLDV-MCNC: 11 MG/DL (ref 8–23)
CALCIUM SERPL-MCNC: 8.2 MG/DL (ref 8.8–10.2)
CHLORIDE BLD-SCNC: 103 MMOL/L (ref 98–111)
CO2: 30 MMOL/L (ref 22–29)
CREAT SERPL-MCNC: <0.5 MG/DL (ref 0.5–0.9)
FOLATE: >20 NG/ML (ref 4.8–37.3)
GFR NON-AFRICAN AMERICAN: >60
GLUCOSE BLD-MCNC: 154 MG/DL (ref 74–109)
HCT VFR BLD CALC: 26.6 % (ref 37–47)
HEMOGLOBIN: 8.3 G/DL (ref 12–16)
IRON SATURATION: 14 % (ref 14–50)
IRON: 20 UG/DL (ref 37–145)
POTASSIUM REFLEX MAGNESIUM: 4 MMOL/L (ref 3.5–5)
SODIUM BLD-SCNC: 139 MMOL/L (ref 136–145)
TOTAL IRON BINDING CAPACITY: 145 UG/DL (ref 250–400)
VITAMIN B-12: 745 PG/ML (ref 211–946)
VITAMIN D 25-HYDROXY: 37.2 NG/ML

## 2018-12-07 PROCEDURE — 6370000000 HC RX 637 (ALT 250 FOR IP): Performed by: ORTHOPAEDIC SURGERY

## 2018-12-07 PROCEDURE — 82607 VITAMIN B-12: CPT

## 2018-12-07 PROCEDURE — 97161 PT EVAL LOW COMPLEX 20 MIN: CPT

## 2018-12-07 PROCEDURE — 80048 BASIC METABOLIC PNL TOTAL CA: CPT

## 2018-12-07 PROCEDURE — 6360000002 HC RX W HCPCS: Performed by: INTERNAL MEDICINE

## 2018-12-07 PROCEDURE — 1210000000 HC MED SURG R&B

## 2018-12-07 PROCEDURE — 83540 ASSAY OF IRON: CPT

## 2018-12-07 PROCEDURE — 2580000003 HC RX 258: Performed by: INTERNAL MEDICINE

## 2018-12-07 PROCEDURE — 82306 VITAMIN D 25 HYDROXY: CPT

## 2018-12-07 PROCEDURE — 83550 IRON BINDING TEST: CPT

## 2018-12-07 PROCEDURE — 6360000002 HC RX W HCPCS: Performed by: ORTHOPAEDIC SURGERY

## 2018-12-07 PROCEDURE — 36569 INSJ PICC 5 YR+ W/O IMAGING: CPT

## 2018-12-07 PROCEDURE — G8978 MOBILITY CURRENT STATUS: HCPCS

## 2018-12-07 PROCEDURE — 2580000003 HC RX 258: Performed by: ORTHOPAEDIC SURGERY

## 2018-12-07 PROCEDURE — 76937 US GUIDE VASCULAR ACCESS: CPT

## 2018-12-07 PROCEDURE — 85018 HEMOGLOBIN: CPT

## 2018-12-07 PROCEDURE — 85014 HEMATOCRIT: CPT

## 2018-12-07 PROCEDURE — 82746 ASSAY OF FOLIC ACID SERUM: CPT

## 2018-12-07 PROCEDURE — 02HV33Z INSERTION OF INFUSION DEVICE INTO SUPERIOR VENA CAVA, PERCUTANEOUS APPROACH: ICD-10-PCS | Performed by: ORTHOPAEDIC SURGERY

## 2018-12-07 PROCEDURE — 36415 COLL VENOUS BLD VENIPUNCTURE: CPT

## 2018-12-07 PROCEDURE — C1751 CATH, INF, PER/CENT/MIDLINE: HCPCS

## 2018-12-07 PROCEDURE — G8979 MOBILITY GOAL STATUS: HCPCS

## 2018-12-07 RX ORDER — SALSALATE 500 MG
2000 TABLET ORAL SEE ADMIN INSTRUCTIONS
COMMUNITY
End: 2021-03-26

## 2018-12-07 RX ORDER — VANCOMYCIN HYDROCHLORIDE 1 G/200ML
1000 INJECTION, SOLUTION INTRAVENOUS EVERY 12 HOURS
Status: DISCONTINUED | OUTPATIENT
Start: 2018-12-07 | End: 2018-12-07 | Stop reason: SDUPTHER

## 2018-12-07 RX ORDER — SALSALATE 500 MG
2500 TABLET ORAL SEE ADMIN INSTRUCTIONS
COMMUNITY
End: 2021-03-26

## 2018-12-07 RX ORDER — SALSALATE 500 MG
2500 TABLET ORAL
Status: DISCONTINUED | OUTPATIENT
Start: 2018-12-08 | End: 2018-12-11 | Stop reason: HOSPADM

## 2018-12-07 RX ORDER — SALSALATE 500 MG
2000 TABLET ORAL
Status: DISCONTINUED | OUTPATIENT
Start: 2018-12-09 | End: 2018-12-11 | Stop reason: HOSPADM

## 2018-12-07 RX ADMIN — SODIUM CHLORIDE, POTASSIUM CHLORIDE, SODIUM LACTATE AND CALCIUM CHLORIDE: 600; 310; 30; 20 INJECTION, SOLUTION INTRAVENOUS at 01:16

## 2018-12-07 RX ADMIN — CARVEDILOL 12.5 MG: 12.5 TABLET, FILM COATED ORAL at 21:54

## 2018-12-07 RX ADMIN — CARVEDILOL 12.5 MG: 12.5 TABLET, FILM COATED ORAL at 09:15

## 2018-12-07 RX ADMIN — Medication 10 ML: at 09:14

## 2018-12-07 RX ADMIN — TRAMADOL HYDROCHLORIDE 50 MG: 50 TABLET, FILM COATED ORAL at 09:14

## 2018-12-07 RX ADMIN — LEVOTHYROXINE SODIUM 75 MCG: 75 TABLET ORAL at 06:04

## 2018-12-07 RX ADMIN — PREDNISONE 2 MG: 1 TABLET ORAL at 09:14

## 2018-12-07 RX ADMIN — MEROPENEM 1 G: 1 INJECTION, POWDER, FOR SOLUTION INTRAVENOUS at 17:28

## 2018-12-07 RX ADMIN — OXYCODONE AND ACETAMINOPHEN 2 TABLET: 5; 325 TABLET ORAL at 23:50

## 2018-12-07 RX ADMIN — PREGABALIN 50 MG: 50 CAPSULE ORAL at 09:14

## 2018-12-07 RX ADMIN — SPIRONOLACTONE 25 MG: 25 TABLET ORAL at 09:14

## 2018-12-07 RX ADMIN — ACETAMINOPHEN 325 MG: 325 TABLET, FILM COATED ORAL at 21:54

## 2018-12-07 RX ADMIN — DOCUSATE SODIUM 100 MG: 100 CAPSULE, LIQUID FILLED ORAL at 21:55

## 2018-12-07 RX ADMIN — OXYCODONE AND ACETAMINOPHEN 2 TABLET: 5; 325 TABLET ORAL at 17:40

## 2018-12-07 RX ADMIN — OXYCODONE AND ACETAMINOPHEN 2 TABLET: 5; 325 TABLET ORAL at 13:12

## 2018-12-07 RX ADMIN — FOLIC ACID 1 MG: 1 TABLET ORAL at 09:14

## 2018-12-07 RX ADMIN — VALACYCLOVIR HYDROCHLORIDE 500 MG: 500 TABLET, FILM COATED ORAL at 09:14

## 2018-12-07 RX ADMIN — OXYCODONE AND ACETAMINOPHEN 2 TABLET: 5; 325 TABLET ORAL at 06:04

## 2018-12-07 RX ADMIN — CETIRIZINE HYDROCHLORIDE 10 MG: 10 TABLET, FILM COATED ORAL at 09:14

## 2018-12-07 RX ADMIN — DOCUSATE SODIUM 100 MG: 100 CAPSULE, LIQUID FILLED ORAL at 09:15

## 2018-12-07 RX ADMIN — RIVAROXABAN 10 MG: 10 TABLET, FILM COATED ORAL at 03:24

## 2018-12-07 RX ADMIN — TRAMADOL HYDROCHLORIDE 50 MG: 50 TABLET, FILM COATED ORAL at 21:54

## 2018-12-07 RX ADMIN — SODIUM CHLORIDE, POTASSIUM CHLORIDE, SODIUM LACTATE AND CALCIUM CHLORIDE: 600; 310; 30; 20 INJECTION, SOLUTION INTRAVENOUS at 09:39

## 2018-12-07 RX ADMIN — ACETAMINOPHEN 325 MG: 325 TABLET, FILM COATED ORAL at 09:15

## 2018-12-07 RX ADMIN — VANCOMYCIN HYDROCHLORIDE 1250 MG: 10 INJECTION, POWDER, LYOPHILIZED, FOR SOLUTION INTRAVENOUS at 21:55

## 2018-12-07 RX ADMIN — ERGOCALCIFEROL 50000 UNITS: 1.25 CAPSULE ORAL at 09:14

## 2018-12-07 RX ADMIN — Medication 10 ML: at 22:15

## 2018-12-07 RX ADMIN — Medication 10 ML: at 09:16

## 2018-12-07 RX ADMIN — MEROPENEM 1 G: 1 INJECTION, POWDER, FOR SOLUTION INTRAVENOUS at 09:45

## 2018-12-07 RX ADMIN — ISOSORBIDE MONONITRATE 30 MG: 30 TABLET, EXTENDED RELEASE ORAL at 09:15

## 2018-12-07 RX ADMIN — Medication 2 G: at 06:04

## 2018-12-07 RX ADMIN — Medication 10 ML: at 22:16

## 2018-12-07 RX ADMIN — FLUTICASONE PROPIONATE 2 SPRAY: 50 SPRAY, METERED NASAL at 09:16

## 2018-12-07 RX ADMIN — VANCOMYCIN HYDROCHLORIDE 1250 MG: 10 INJECTION, POWDER, LYOPHILIZED, FOR SOLUTION INTRAVENOUS at 10:46

## 2018-12-07 RX ADMIN — PANTOPRAZOLE SODIUM 40 MG: 40 TABLET, DELAYED RELEASE ORAL at 09:14

## 2018-12-07 RX ADMIN — MULTIPLE VITAMINS W/ MINERALS TAB 1 TABLET: TAB at 09:14

## 2018-12-07 RX ADMIN — RIVAROXABAN 10 MG: 10 TABLET, FILM COATED ORAL at 17:28

## 2018-12-07 ASSESSMENT — ENCOUNTER SYMPTOMS
COUGH: 0
WHEEZING: 0
NAUSEA: 0
SORE THROAT: 0
ABDOMINAL PAIN: 0
STRIDOR: 0
DIARRHEA: 0
CONSTIPATION: 0
BLOOD IN STOOL: 0
EYE DISCHARGE: 0
SHORTNESS OF BREATH: 0

## 2018-12-07 ASSESSMENT — PAIN SCALES - GENERAL
PAINLEVEL_OUTOF10: 6
PAINLEVEL_OUTOF10: 3
PAINLEVEL_OUTOF10: 5
PAINLEVEL_OUTOF10: 5
PAINLEVEL_OUTOF10: 1
PAINLEVEL_OUTOF10: 8
PAINLEVEL_OUTOF10: 4
PAINLEVEL_OUTOF10: 8

## 2018-12-07 ASSESSMENT — PAIN DESCRIPTION - ORIENTATION
ORIENTATION: RIGHT

## 2018-12-07 ASSESSMENT — PAIN DESCRIPTION - LOCATION
LOCATION: KNEE

## 2018-12-07 ASSESSMENT — PAIN DESCRIPTION - PAIN TYPE
TYPE: ACUTE PAIN;SURGICAL PAIN

## 2018-12-07 NOTE — PROCEDURES
PICC Line Insertion Procedure Note    Procedure: Insertion of #4 FR/18G PICC    Indications:  Home IV therapy    Procedure Details   Informed consent was obtained for the procedure, including use of local anesthetic. Risks and benefits were discussed. #4 FR/18G PICC inserted to the R Basilic vein per hospital protocol. Blood return:  yes    Findings:  Catheter inserted to 37 cm, with 0 cm. Exposed. Catheter was flushed with 10 cc NS. Patient did tolerate procedure well. Recommendations:  Tip location confirmed within the SVC by 3CG technology. Okay to use. Yfn Salgado notified. PICC Brochure given to patient with teaching instruction.

## 2018-12-07 NOTE — PROGRESS NOTES
4 Eyes Skin Assessment    Rhea Risk is being assessed upon: Transfer to New Unit    I agree that Júnior Shetty, along with Wayne Oates RN (either 2 RN's or 1 LPN and 1 RN) have performed a thorough Head to Toe Skin Assessment on the patient. ALL assessment sites listed below have been assessed. Areas assessed by both nurses:     [x]   Head, Face, and Ears   [x]   Shoulders, Back, and Chest  [x]   Arms, Elbows, and Hands   [x]   Coccyx, Sacrum, and Ischium  [x]   Legs, Feet, and Heels    Does the Patient have Skin Breakdown? Yes, wound(s) noted upon assessment. It is the responsibility of the Primary Nurse to assure that the following documentation, preventions, orders, and consults are complete on the above noted wound(s): Wound LDA initiated. LDA Flowsheet Documentation includes the Aixa-wound, Wound Assessment, Measurements, Dressing Treatment, Drainage, and Color.     Varinder Prevention initiated: No  Wound Care Orders initiated: No    WOC nurse consulted for Pressure Injury (Stage 3,4, Unstageable, DTI, NWPT, and Complex wounds) and New or Established Ostomies: No        Primary Nurse eSignature: Michael Rosas RN on 12/7/2018 at 2:28 AM      Co-Signer eSignature: Electronically signed by Stacey Minaya RN on 12/7/18 at 2:49 AM

## 2018-12-07 NOTE — PROGRESS NOTES
Adin Zuleta arrived to room # 312. Presented with: infected R knee    Mental Status: Patient is oriented, alert, coherent, logical, thought processes intact and able to concentrate and follow conversation. Vitals:    12/07/18 0215   BP: (!) 104/56   Pulse: 61   Resp: 16   Temp: 97.3 °F (36.3 °C)   SpO2: 97%     Patient safety contract and falls prevention contract reviewed with patient Yes. Oriented Patient to room. Call light within reach. Yes.   Needs, issues or concerns expressed at this time: no.      Electronically signed by Julio C Rizo RN on 12/7/2018 at 2:29 AM

## 2018-12-07 NOTE — PROGRESS NOTES
With: Spouse  Home Equipment: Rolling walker  ADL Assistance: Independent  Ambulation Assistance: Independent  Transfer Assistance: Independent  Cognition        Objective          AROM RLE (degrees)  RLE General AROM: ABLE TO SIT AND FLEX KNEE TO GROSSLY 80-85 DEG  AROM LLE (degrees)  LLE AROM : WFL  Strength RLE  Comment: ABLE TO MOVE AGAINST GRAVITY  Strength LLE  Strength LLE: WFL     Sensation  Overall Sensation Status: WFL  Bed mobility  Supine to Sit: Modified independent  Transfers  Sit to Stand: Stand by assistance;Contact guard assistance  Stand to sit: Stand by assistance;Contact guard assistance  Bed to Chair: Stand by assistance;Contact guard assistance  Ambulation  Ambulation?: Yes  Ambulation 1  Surface: level tile  Device: Rolling Walker  Other Apparatus:  (GT BELT)  Assistance: Contact guard assistance;Stand by assistance  Quality of Gait: SLOW, FLEXED TRUNK, ANTALGIC, UNEVEN STEP LENGTH  Distance: 15 FT, 20 FT     Balance  Sitting - Static: Good  Sitting - Dynamic: Good  Standing - Static: Good  Standing - Dynamic: Good        Assessment   Body structures, Functions, Activity limitations: Decreased functional mobility ; Decreased ROM; Decreased strength;Decreased endurance  Assessment: pt WORKING WELL WITH PT POST OP TO IMPROVE GT, STRENGTH AND ROM  Prognosis: Good  Decision Making: Low Complexity  Patient Education: FALL PREVENTION  REQUIRES PT FOLLOW UP: Yes  Activity Tolerance  Activity Tolerance: Patient Tolerated treatment well         Plan   Plan  Times per week: PT AT LEAST ONCE DAILY X 14 DAYS  Current Treatment Recommendations: Functional Mobility Training, Transfer Training, Gait Training, Pain Management, Positioning, Safety Education & Training  Plan Comment: WBAT R LE  Safety Devices  Type of devices: Call light within reach, Gait belt, Left in chair    G-Code  PT G-Codes  Functional Assessment Tool Used: BED>CHAIR  Score: CJ, CGA  Functional Limitation: Mobility: Walking and moving around  Mobility: Walking and Moving Around Current Status (): At least 20 percent but less than 40 percent impaired, limited or restricted  Mobility: Walking and Moving Around Goal Status ():  At least 1 percent but less than 20 percent impaired, limited or restricted  OutComes Score                                           AM-PAC Score             Goals  Short term goals  Time Frame for Short term goals: 14 DAYS BEFORE RE EVAL  Short term goal 1: SIT<>STAND, IND  Short term goal 2:  FT WITH RW AND SBA       Therapy Time   Individual Concurrent Group Co-treatment   Time In           Time Out           Minutes                   Fernanda Ma PT    Electronically signed by Fernanda Ma PT on 12/7/2018 at 1:49 PM

## 2018-12-07 NOTE — PROGRESS NOTES
Pharmacy Note  Vancomycin Consult    Fátima Aguayo is a 70 y.o. female started on Vancomycin for Right knee explant, antibiotic spacer; consult received from Dr. Monie Davis to manage therapy. Also receiving the following antibiotics: Merrem. Patient Active Problem List   Diagnosis    CAD (coronary artery disease)    Hypertension    Rheumatoid arteritis    History of pulmonary fibrosis    History of DVT (deep vein thrombosis)    Thyroid disease    Intrinsic asthma    Hypercholesterolemia    History of neutropenia    Syncope, cardiogenic    Near syncope    Status post placement of implantable loop recorder    Left carotid bruit    S/P coronary artery stent placement    Chest pain    Pacemaker    Sinus node dysfunction (HCC)    Lumbar stenosis with neurogenic claudication    Spondylolisthesis of lumbar region    Leg swelling    Primary osteoarthritis of right knee    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)       Allergies:  Amoxicillin; Lipitor; Niaspan [niacin er]; Penicillins; Relafen [nabumetone]; and Zocor [simvastatin]     Temp max: 98.1    Recent Labs      12/05/18   1000  12/07/18   0323   BUN  15  11       Recent Labs      12/05/18   1000  12/07/18   0323   CREATININE  0.7  <0.5       Recent Labs      12/05/18   1000   WBC  5.7         Intake/Output Summary (Last 24 hours) at 12/07/18 0920  Last data filed at 12/07/18 9616   Gross per 24 hour   Intake 2120 ml   Output 1840 ml   Net 280 ml       Culture Date Source Results   12/06/18 Surgical Moderate WBC's; growth too young   12/06/18 Fungal No fungal elements seen            Ht Readings from Last 1 Encounters:   12/06/18 5' 6\" (1.676 m)        Wt Readings from Last 1 Encounters:   12/06/18 190 lb (86.2 kg)         Body mass index is 30.67 kg/m². CrCl cannot be calculated (This lab value cannot be used to calculate CrCl because it is not a number: <0.5). Assessment/Plan:  Will initiate vancomycin 1250 mg IV every 12 hours.

## 2018-12-07 NOTE — PROGRESS NOTES
Subjective:     Post-Operative Day: 1 NO complaints     Objective:     Patient Vitals for the past 24 hrs:   BP Temp Temp src Pulse Resp SpO2 Height Weight   12/07/18 0647 117/72 98.1 °F (36.7 °C) Temporal 58 18 99 % - -   12/07/18 0216 - 97.3 °F (36.3 °C) - 61 16 - - -   12/07/18 0215 (!) 104/56 97.3 °F (36.3 °C) Temporal 61 16 97 % - -   12/06/18 2005 (!) 157/70 96.6 °F (35.9 °C) Temporal 60 14 96 % - -   12/06/18 1940 (!) 150/58 - - 62 15 96 % - -   12/06/18 1930 (!) 157/57 97.6 °F (36.4 °C) Temporal 65 13 95 % - -   12/06/18 1925 (!) 147/57 - - 66 16 98 % - -   12/06/18 1915 (!) 155/57 - - 64 11 99 % - -   12/06/18 1910 (!) 159/70 - - 69 15 100 % - -   12/06/18 1909 - - - 70 - - - -   12/06/18 1904 (!) 159/70 98.8 °F (37.1 °C) Temporal 71 16 98 % - -   12/06/18 1332 (!) 143/72 99.8 °F (37.7 °C) Temporal 77 16 97 % 5' 6\" (1.676 m) 190 lb (86.2 kg)       General: alert, appears stated age and cooperative   Wound: Dressing clean,dry and intact and mod swelling    Neurovascular: Exam normal   DVT Exam: No evidence of DVT seen on physical exam.         Data Review:  Recent Labs      12/05/18   1000  12/07/18   0323   HGB  11.3*  8.3*     Recent Labs      12/07/18   0323   NA  139   K  4.0   CREATININE  <0.5     Xray ok right knee  [id number] then 40  cx neg to date x 3    Assessment:     Status Post right knee explant and antibiotic spacer. preop cx serratia, proteus, strep. Doing well postoperatively. . Acute postoperative blood loss anemia being monitored with daily hemoglobin/hematocrit.     Plan:   Pic line  Consult ID  Leave drain in today  Follow cx  Pain control  Tight blood glucose control  PT/OT  DVT prophylaxis  Ice and elevate  Discharge Home with [address] this week

## 2018-12-07 NOTE — CONSULTS
CATHETERIZATION  5/14/14  MDL    normal LV function. EF 60%    CARDIAC CATHETERIZATION  1/26/15  MDL    EF 60%, Left circ has long 80-90% in small 2mm vessel, diffuse disease in RCA    CORONARY ANGIOPLASTY WITH STENT PLACEMENT  2014    DIAGNOSTIC CARDIAC CATH LAB PROCEDURE      Right coronary artery stent. 9/2010    JOINT REPLACEMENT      LEEP      PACEMAKER INSERTION  11/17/2016    Dr Cindy Moody; [employer]tronic    PACEMAKER PLACEMENT      medtronic    OH INCIS/DRAIN THIGH/KNEE ABSCESS,DEEP Right 10/23/2018    KNEE INCISION AND DRAINAGE performed by Maday Loyd MD at [place] [id number] Right 9/11/2018    KNEE TOTAL ARTHROPLASTY performed by Maday Loyd MD at [facility] OR        Medications Prior to Admission:       Prior to Admission medications    Medication Sig Start Date End Date Taking? Authorizing Provider   Multiple Vitamins-Minerals (THERAPEUTIC MULTIVITAMIN-MINERALS) tablet Take 1 tablet by mouth daily   Yes Historical Provider, MD   carvedilol (COREG) 12.5 MG tablet Take 1 tablet by mouth 2 times daily  Patient taking differently: Take 12.5 mg by mouth daily  12/4/18  Yes MELISSA Welsh   docusate sodium (COLACE) 100 MG capsule Take 1 capsule by mouth daily To prevent constipation 9/12/18  Yes Maday Loyd MD   isosorbide mononitrate (IMDUR) 30 MG extended release tablet Take 30 mg by mouth daily   Yes Historical Provider, MD   predniSONE (DELTASONE) 1 MG tablet Take 2 mg by mouth daily   Yes Historical Provider, MD   traMADol-acetaminophen (ULTRACET) 37.5-325 MG per tablet Take 1 tablet by mouth 2 times daily. Pam Torres Historical Provider, MD   salsalate (DISALCID) 500 MG tablet Take 500 mg by mouth 2 times daily TAKE 5 PILLS DAILY Monday, Wednesday, Friday AND Sunday  TAKE 4 PILLS Tuesday, Thursday AND SATURDAY    Yes Historical Provider, MD   pregabalin (LYRICA) 50 MG capsule Take 1 capsule by mouth daily for 28 days.  2/2/18 12/6/18 Yes Deena Rodriguez
pending.     Recommendations:    Continue empiric vancomycin and meropenem  Await final cultures  Monitor lab, renal function and cultures  Additional recommendations to follow once we have additional culture results available  Discussed with patient plan for probable 6 weeks of intravenous antimicrobial treatment  Continue to follow        Raffaele Wiley MD  12/07/18  2:26 PM

## 2018-12-08 LAB
HCT VFR BLD CALC: 23.8 % (ref 37–47)
HEMOGLOBIN: 7.4 G/DL (ref 12–16)
VANCOMYCIN TROUGH: 13.9 UG/ML (ref 10–20)

## 2018-12-08 PROCEDURE — 2580000003 HC RX 258: Performed by: ORTHOPAEDIC SURGERY

## 2018-12-08 PROCEDURE — 1210000000 HC MED SURG R&B

## 2018-12-08 PROCEDURE — 36415 COLL VENOUS BLD VENIPUNCTURE: CPT

## 2018-12-08 PROCEDURE — 80202 ASSAY OF VANCOMYCIN: CPT

## 2018-12-08 PROCEDURE — 36592 COLLECT BLOOD FROM PICC: CPT

## 2018-12-08 PROCEDURE — 2580000003 HC RX 258: Performed by: INTERNAL MEDICINE

## 2018-12-08 PROCEDURE — 85014 HEMATOCRIT: CPT

## 2018-12-08 PROCEDURE — 85018 HEMOGLOBIN: CPT

## 2018-12-08 PROCEDURE — 97116 GAIT TRAINING THERAPY: CPT

## 2018-12-08 PROCEDURE — 6370000000 HC RX 637 (ALT 250 FOR IP): Performed by: ORTHOPAEDIC SURGERY

## 2018-12-08 PROCEDURE — 6360000002 HC RX W HCPCS: Performed by: INTERNAL MEDICINE

## 2018-12-08 PROCEDURE — 6360000002 HC RX W HCPCS: Performed by: ORTHOPAEDIC SURGERY

## 2018-12-08 PROCEDURE — 2700000000 HC OXYGEN THERAPY PER DAY

## 2018-12-08 RX ADMIN — ACETAMINOPHEN 325 MG: 325 TABLET, FILM COATED ORAL at 21:36

## 2018-12-08 RX ADMIN — OXYCODONE AND ACETAMINOPHEN 2 TABLET: 5; 325 TABLET ORAL at 19:22

## 2018-12-08 RX ADMIN — LEVOTHYROXINE SODIUM 75 MCG: 75 TABLET ORAL at 05:49

## 2018-12-08 RX ADMIN — TRAMADOL HYDROCHLORIDE 50 MG: 50 TABLET, FILM COATED ORAL at 09:47

## 2018-12-08 RX ADMIN — MULTIPLE VITAMINS W/ MINERALS TAB 1 TABLET: TAB at 09:48

## 2018-12-08 RX ADMIN — PREDNISONE 2 MG: 1 TABLET ORAL at 09:46

## 2018-12-08 RX ADMIN — FOLIC ACID 1 MG: 1 TABLET ORAL at 09:46

## 2018-12-08 RX ADMIN — MEROPENEM 1 G: 1 INJECTION, POWDER, FOR SOLUTION INTRAVENOUS at 01:33

## 2018-12-08 RX ADMIN — CETIRIZINE HYDROCHLORIDE 10 MG: 10 TABLET, FILM COATED ORAL at 09:47

## 2018-12-08 RX ADMIN — SODIUM CHLORIDE, POTASSIUM CHLORIDE, SODIUM LACTATE AND CALCIUM CHLORIDE: 600; 310; 30; 20 INJECTION, SOLUTION INTRAVENOUS at 01:27

## 2018-12-08 RX ADMIN — DOCUSATE SODIUM 100 MG: 100 CAPSULE, LIQUID FILLED ORAL at 09:46

## 2018-12-08 RX ADMIN — TRAMADOL HYDROCHLORIDE 50 MG: 50 TABLET, FILM COATED ORAL at 21:36

## 2018-12-08 RX ADMIN — SODIUM CHLORIDE, POTASSIUM CHLORIDE, SODIUM LACTATE AND CALCIUM CHLORIDE: 600; 310; 30; 20 INJECTION, SOLUTION INTRAVENOUS at 21:35

## 2018-12-08 RX ADMIN — FLUTICASONE PROPIONATE 2 SPRAY: 50 SPRAY, METERED NASAL at 09:48

## 2018-12-08 RX ADMIN — ACETAMINOPHEN 325 MG: 325 TABLET, FILM COATED ORAL at 09:46

## 2018-12-08 RX ADMIN — Medication 10 ML: at 09:48

## 2018-12-08 RX ADMIN — CARVEDILOL 12.5 MG: 12.5 TABLET, FILM COATED ORAL at 21:36

## 2018-12-08 RX ADMIN — MEROPENEM 1 G: 1 INJECTION, POWDER, FOR SOLUTION INTRAVENOUS at 09:47

## 2018-12-08 RX ADMIN — OXYCODONE AND ACETAMINOPHEN 2 TABLET: 5; 325 TABLET ORAL at 05:51

## 2018-12-08 RX ADMIN — VALACYCLOVIR HYDROCHLORIDE 500 MG: 500 TABLET, FILM COATED ORAL at 09:47

## 2018-12-08 RX ADMIN — MEROPENEM 1 G: 1 INJECTION, POWDER, FOR SOLUTION INTRAVENOUS at 17:06

## 2018-12-08 RX ADMIN — PREGABALIN 50 MG: 50 CAPSULE ORAL at 09:47

## 2018-12-08 RX ADMIN — CARVEDILOL 12.5 MG: 12.5 TABLET, FILM COATED ORAL at 09:47

## 2018-12-08 RX ADMIN — ONDANSETRON 4 MG: 2 INJECTION INTRAMUSCULAR; INTRAVENOUS at 03:35

## 2018-12-08 RX ADMIN — RIVAROXABAN 10 MG: 10 TABLET, FILM COATED ORAL at 17:06

## 2018-12-08 RX ADMIN — VANCOMYCIN HYDROCHLORIDE 1250 MG: 10 INJECTION, POWDER, LYOPHILIZED, FOR SOLUTION INTRAVENOUS at 11:22

## 2018-12-08 RX ADMIN — ISOSORBIDE MONONITRATE 30 MG: 30 TABLET, EXTENDED RELEASE ORAL at 09:47

## 2018-12-08 RX ADMIN — SPIRONOLACTONE 25 MG: 25 TABLET ORAL at 09:47

## 2018-12-08 RX ADMIN — Medication 2500 MG: at 09:50

## 2018-12-08 RX ADMIN — MORPHINE SULFATE 4 MG: 2 INJECTION, SOLUTION INTRAMUSCULAR; INTRAVENOUS at 03:31

## 2018-12-08 RX ADMIN — PANTOPRAZOLE SODIUM 40 MG: 40 TABLET, DELAYED RELEASE ORAL at 09:47

## 2018-12-08 ASSESSMENT — ENCOUNTER SYMPTOMS
SHORTNESS OF BREATH: 0
VOMITING: 0
DIARRHEA: 0
COUGH: 0
NAUSEA: 0

## 2018-12-08 ASSESSMENT — PAIN SCALES - GENERAL
PAINLEVEL_OUTOF10: 2
PAINLEVEL_OUTOF10: 4
PAINLEVEL_OUTOF10: 3
PAINLEVEL_OUTOF10: 5
PAINLEVEL_OUTOF10: 4
PAINLEVEL_OUTOF10: 4
PAINLEVEL_OUTOF10: 7
PAINLEVEL_OUTOF10: 4
PAINLEVEL_OUTOF10: 2

## 2018-12-08 NOTE — PROGRESS NOTES
estela drain removed from right knee, pt tolerated well, catheter intact when removed. Patient up in chair at this time with legs elevated. 4x4 fluffs, abd, and ace wrap applied to right leg.

## 2018-12-08 NOTE — PROGRESS NOTES
Active Hospital Problems    Diagnosis Date Noted    Infection of total right knee replacement [facility]) Jaime Watson 12/06/2018     Past Medical History:   Diagnosis Date    CAD (coronary artery disease)     S/p Stent to right coronary artery 9/2010. S/p myocardial infarction May 2013.  Cellulitis     LT LEG IN PAST    History of blood transfusion     History of DVT (deep vein thrombosis)     LT    History of neutropenia     History of pulmonary fibrosis     Hx of blood clots 1992    LT LEG    Hypercholesterolemia     Hypertension     Intrinsic asthma     Pacemaker 11/17/2016    Post op infection     \"right knee leaking after my replacement\"    Rheumatoid arteritis     Sinus node dysfunction (HCC)     Staph aureus infection     Status post placement of implantable loop recorder 2/13/15, 3/30/15    2/19/15  removed, infection    Syncope 2/11/2015    Thyroid disease     HYPOTHYROIDISM        Plan:        1. Acute post operative blood loss anemia  2. Infection right knee prosthesis  3. Rheumatoid arthritis  4.  Hypothyroidism    Agree with current care, monitor hgb, continue abx      Electronically signed by Sherry Armas MD on 12/8/2018 at 3:03 PM

## 2018-12-09 LAB
HCT VFR BLD CALC: 23.5 % (ref 37–47)
HEMOGLOBIN: 7.2 G/DL (ref 12–16)

## 2018-12-09 PROCEDURE — 6360000002 HC RX W HCPCS: Performed by: INTERNAL MEDICINE

## 2018-12-09 PROCEDURE — 2580000003 HC RX 258: Performed by: INTERNAL MEDICINE

## 2018-12-09 PROCEDURE — 6370000000 HC RX 637 (ALT 250 FOR IP): Performed by: ORTHOPAEDIC SURGERY

## 2018-12-09 PROCEDURE — 85014 HEMATOCRIT: CPT

## 2018-12-09 PROCEDURE — 97116 GAIT TRAINING THERAPY: CPT

## 2018-12-09 PROCEDURE — 85018 HEMOGLOBIN: CPT

## 2018-12-09 PROCEDURE — 2580000003 HC RX 258: Performed by: ORTHOPAEDIC SURGERY

## 2018-12-09 PROCEDURE — 1210000000 HC MED SURG R&B

## 2018-12-09 RX ORDER — FUROSEMIDE 10 MG/ML
20 INJECTION INTRAMUSCULAR; INTRAVENOUS ONCE
Status: COMPLETED | OUTPATIENT
Start: 2018-12-09 | End: 2018-12-09

## 2018-12-09 RX ADMIN — MEROPENEM 1 G: 1 INJECTION, POWDER, FOR SOLUTION INTRAVENOUS at 03:53

## 2018-12-09 RX ADMIN — TRAMADOL HYDROCHLORIDE 50 MG: 50 TABLET, FILM COATED ORAL at 20:23

## 2018-12-09 RX ADMIN — LEVOTHYROXINE SODIUM 75 MCG: 75 TABLET ORAL at 09:34

## 2018-12-09 RX ADMIN — VANCOMYCIN HYDROCHLORIDE 1250 MG: 10 INJECTION, POWDER, LYOPHILIZED, FOR SOLUTION INTRAVENOUS at 11:05

## 2018-12-09 RX ADMIN — FOLIC ACID 1 MG: 1 TABLET ORAL at 09:36

## 2018-12-09 RX ADMIN — PREGABALIN 50 MG: 50 CAPSULE ORAL at 09:35

## 2018-12-09 RX ADMIN — PANTOPRAZOLE SODIUM 40 MG: 40 TABLET, DELAYED RELEASE ORAL at 09:34

## 2018-12-09 RX ADMIN — OXYCODONE AND ACETAMINOPHEN 2 TABLET: 5; 325 TABLET ORAL at 18:15

## 2018-12-09 RX ADMIN — VALACYCLOVIR HYDROCHLORIDE 500 MG: 500 TABLET, FILM COATED ORAL at 09:35

## 2018-12-09 RX ADMIN — RIVAROXABAN 10 MG: 10 TABLET, FILM COATED ORAL at 17:23

## 2018-12-09 RX ADMIN — OXYCODONE AND ACETAMINOPHEN 2 TABLET: 5; 325 TABLET ORAL at 02:55

## 2018-12-09 RX ADMIN — PREDNISONE 2 MG: 1 TABLET ORAL at 09:34

## 2018-12-09 RX ADMIN — CARVEDILOL 12.5 MG: 12.5 TABLET, FILM COATED ORAL at 20:22

## 2018-12-09 RX ADMIN — TRAMADOL HYDROCHLORIDE 50 MG: 50 TABLET, FILM COATED ORAL at 09:36

## 2018-12-09 RX ADMIN — OXYCODONE AND ACETAMINOPHEN 2 TABLET: 5; 325 TABLET ORAL at 09:45

## 2018-12-09 RX ADMIN — ACETAMINOPHEN 325 MG: 325 TABLET, FILM COATED ORAL at 20:23

## 2018-12-09 RX ADMIN — MEROPENEM 1 G: 1 INJECTION, POWDER, FOR SOLUTION INTRAVENOUS at 17:23

## 2018-12-09 RX ADMIN — CETIRIZINE HYDROCHLORIDE 10 MG: 10 TABLET, FILM COATED ORAL at 09:35

## 2018-12-09 RX ADMIN — FUROSEMIDE 20 MG: 10 INJECTION, SOLUTION INTRAMUSCULAR; INTRAVENOUS at 14:21

## 2018-12-09 RX ADMIN — Medication 2000 MG: at 09:36

## 2018-12-09 RX ADMIN — Medication 10 ML: at 20:23

## 2018-12-09 RX ADMIN — FLUTICASONE PROPIONATE 2 SPRAY: 50 SPRAY, METERED NASAL at 14:23

## 2018-12-09 RX ADMIN — MULTIPLE VITAMINS W/ MINERALS TAB 1 TABLET: TAB at 09:35

## 2018-12-09 RX ADMIN — ISOSORBIDE MONONITRATE 30 MG: 30 TABLET, EXTENDED RELEASE ORAL at 09:35

## 2018-12-09 RX ADMIN — VANCOMYCIN HYDROCHLORIDE 1250 MG: 10 INJECTION, POWDER, LYOPHILIZED, FOR SOLUTION INTRAVENOUS at 01:00

## 2018-12-09 RX ADMIN — ACETAMINOPHEN 325 MG: 325 TABLET, FILM COATED ORAL at 09:36

## 2018-12-09 RX ADMIN — SPIRONOLACTONE 25 MG: 25 TABLET ORAL at 09:35

## 2018-12-09 RX ADMIN — MEROPENEM 1 G: 1 INJECTION, POWDER, FOR SOLUTION INTRAVENOUS at 09:36

## 2018-12-09 RX ADMIN — DOCUSATE SODIUM 100 MG: 100 CAPSULE, LIQUID FILLED ORAL at 20:22

## 2018-12-09 ASSESSMENT — ENCOUNTER SYMPTOMS
NAUSEA: 0
COUGH: 0
DIARRHEA: 0
SHORTNESS OF BREATH: 0
VOMITING: 0

## 2018-12-09 ASSESSMENT — PAIN SCALES - GENERAL
PAINLEVEL_OUTOF10: 3
PAINLEVEL_OUTOF10: 6
PAINLEVEL_OUTOF10: 6
PAINLEVEL_OUTOF10: 3
PAINLEVEL_OUTOF10: 7
PAINLEVEL_OUTOF10: 6
PAINLEVEL_OUTOF10: 6

## 2018-12-09 NOTE — PROGRESS NOTES
Physical Therapy  Name: Nelia Byrd  MRN:  [id number]  Date of service:  12/9/2018 12/09/18 1009   Subjective   Subjective states her knee isstiff and sore, had pain meds about 15' ago, willing to walk   Bed Mobility   Supine to Sit Stand by assistance   Transfers   Sit to Stand Stand by assistance   Stand to sit Stand by assistance   Bed to Chair Stand by assistance;Contact guard assistance   Ambulation   Ambulation? Yes   Ambulation 1   Surface level tile   Device Rolling Walker   Other Apparatus (iv)   Assistance Contact guard assistance;Stand by assistance   Distance 200'   Comments increased amb distance    Short term goals   Time Frame for Short term goals 14 DAYS BEFORE RE EVAL   Short term goal 1 SIT<>STAND, IND   Short term goal 2  FT WITH RW AND SBA   Conditions Requiring Skilled Therapeutic Intervention   Body structures, Functions, Activity limitations Decreased functional mobility ; Decreased ROM; Decreased strength;Decreased endurance   Assessment good increase of amb distance, up in chair with all needs in reach   Discharge Recommendations Continue to assess pending progress   Activity Tolerance   Activity Tolerance Patient Tolerated treatment well   Plan   Times per week PT AT LEAST ONCE DAILY X 14 DAYS   Current Treatment Recommendations Functional Mobility Training;Transfer Training;Gait Training;Pain Management;Positioning; Safety Education & Training   Plan Comment WBAT R LE   Safety Devices   Type of devices Call light within reach; Left in chair         Electronically signed by Gavino Banks PTA on 12/9/2018 at 10:11 AM

## 2018-12-09 NOTE — PROGRESS NOTES
Infectious Diseases Progress Note    Patient:  Fátima Aguayo  YOB: 1955  MRN: [id number]   Admit date: 12/6/2018   Admitting Physician: Rachel Wade [facility],*  Primary Care Physician: Teresa Puentes MD    Chief Complaint/Interval History:  She has been up with physical therapy. Drains out. She has no complaints at present. Breathing comfortably. No diarrhea or rash with antibiotic treatment. No new symptoms. In/Out    Intake/Output Summary (Last 24 hours) at 12/09/18 1640  Last data filed at 12/09/18 1131   Gross per 24 hour   Intake              523 ml   Output                0 ml   Net              523 ml     Allergies:    Allergies   Allergen Reactions    Amoxicillin Rash    Lipitor Rash    Niaspan [Niacin Er] Rash    Penicillins Rash    Relafen [Nabumetone] Rash    Zocor [Simvastatin] Rash     Muscle cramps     Current Meds:   meropenem (MERREM) 1 g in sodium chloride 0.9 % 100 mL IVPB Q8H   salsalate (DISALCID) tablet 2,500 mg Once per day on Mon Wed Fri Sat   salsalate (DISALCID) tablet 2,000 mg Once per day on Sun Tue Thu   folic acid (FOLVITE) tablet 1 mg Daily   cetirizine (ZYRTEC) tablet 10 mg Daily   levothyroxine (SYNTHROID) tablet 75 mcg Daily   fluticasone (FLONASE) 50 MCG/ACT nasal spray 2 spray Daily   spironolactone (ALDACTONE) tablet 25 mg Daily   vitamin D (ERGOCALCIFEROL) capsule 50,000 Units Weekly   pregabalin (LYRICA) capsule 50 mg Daily   valACYclovir (VALTREX) tablet 500 mg Daily   pantoprazole (PROTONIX) tablet 40 mg Daily   nitroGLYCERIN (NITROSTAT) SL tablet 0.4 mg Q5 Min PRN   predniSONE (DELTASONE) tablet 2 mg Daily   isosorbide mononitrate (IMDUR) extended release tablet 30 mg Daily   docusate sodium (COLACE) capsule 100 mg Daily   carvedilol (COREG) tablet 12.5 mg BID   therapeutic multivitamin-minerals 1 tablet Daily   0.9 % sodium chloride bolus PRN   sodium chloride flush 0.9 % injection 10 mL 2 times per day   sodium chloride flush 0.9 % injection 10 mL

## 2018-12-10 LAB
ANAEROBIC CULTURE: ABNORMAL
ANION GAP SERPL CALCULATED.3IONS-SCNC: 9 MMOL/L (ref 7–19)
BUN BLDV-MCNC: 8 MG/DL (ref 8–23)
CALCIUM SERPL-MCNC: 8.3 MG/DL (ref 8.8–10.2)
CHLORIDE BLD-SCNC: 103 MMOL/L (ref 98–111)
CO2: 29 MMOL/L (ref 22–29)
CREAT SERPL-MCNC: 0.5 MG/DL (ref 0.5–0.9)
CULTURE SURGICAL: ABNORMAL
GFR NON-AFRICAN AMERICAN: >60
GLUCOSE BLD-MCNC: 109 MG/DL (ref 74–109)
GRAM STAIN RESULT: ABNORMAL
HCT VFR BLD CALC: 25.2 % (ref 37–47)
HEMOGLOBIN: 7.7 G/DL (ref 12–16)
MCH RBC QN AUTO: 28.6 PG (ref 27–31)
MCHC RBC AUTO-ENTMCNC: 30.6 G/DL (ref 33–37)
MCV RBC AUTO: 93.7 FL (ref 81–99)
ORGANISM: ABNORMAL
PDW BLD-RTO: 15.9 % (ref 11.5–14.5)
PLATELET # BLD: 215 K/UL (ref 130–400)
PMV BLD AUTO: 10.2 FL (ref 9.4–12.3)
POTASSIUM SERPL-SCNC: 3.3 MMOL/L (ref 3.5–5)
RBC # BLD: 2.69 M/UL (ref 4.2–5.4)
SODIUM BLD-SCNC: 141 MMOL/L (ref 136–145)
WBC # BLD: 6.2 K/UL (ref 4.8–10.8)

## 2018-12-10 PROCEDURE — 6360000002 HC RX W HCPCS: Performed by: INTERNAL MEDICINE

## 2018-12-10 PROCEDURE — 2580000003 HC RX 258: Performed by: INTERNAL MEDICINE

## 2018-12-10 PROCEDURE — 1210000000 HC MED SURG R&B

## 2018-12-10 PROCEDURE — 97116 GAIT TRAINING THERAPY: CPT

## 2018-12-10 PROCEDURE — 80048 BASIC METABOLIC PNL TOTAL CA: CPT

## 2018-12-10 PROCEDURE — 6370000000 HC RX 637 (ALT 250 FOR IP): Performed by: INTERNAL MEDICINE

## 2018-12-10 PROCEDURE — 2580000003 HC RX 258: Performed by: ORTHOPAEDIC SURGERY

## 2018-12-10 PROCEDURE — 85027 COMPLETE CBC AUTOMATED: CPT

## 2018-12-10 PROCEDURE — 6370000000 HC RX 637 (ALT 250 FOR IP): Performed by: ORTHOPAEDIC SURGERY

## 2018-12-10 RX ORDER — POTASSIUM CHLORIDE 20 MEQ/1
40 TABLET, EXTENDED RELEASE ORAL 2 TIMES DAILY
Status: COMPLETED | OUTPATIENT
Start: 2018-12-10 | End: 2018-12-10

## 2018-12-10 RX ORDER — OXYCODONE HYDROCHLORIDE AND ACETAMINOPHEN 5; 325 MG/1; MG/1
1 TABLET ORAL EVERY 4 HOURS PRN
Qty: 40 TABLET | Refills: 0 | Status: SHIPPED | OUTPATIENT
Start: 2018-12-10 | End: 2018-12-17

## 2018-12-10 RX ORDER — FUROSEMIDE 10 MG/ML
20 INJECTION INTRAMUSCULAR; INTRAVENOUS ONCE
Status: COMPLETED | OUTPATIENT
Start: 2018-12-10 | End: 2018-12-10

## 2018-12-10 RX ADMIN — RIVAROXABAN 10 MG: 10 TABLET, FILM COATED ORAL at 17:57

## 2018-12-10 RX ADMIN — NYSTATIN 500000 UNITS: 100000 SUSPENSION ORAL at 17:58

## 2018-12-10 RX ADMIN — MULTIPLE VITAMINS W/ MINERALS TAB 1 TABLET: TAB at 09:45

## 2018-12-10 RX ADMIN — PANTOPRAZOLE SODIUM 40 MG: 40 TABLET, DELAYED RELEASE ORAL at 09:07

## 2018-12-10 RX ADMIN — Medication 10 ML: at 20:19

## 2018-12-10 RX ADMIN — Medication 2500 MG: at 08:00

## 2018-12-10 RX ADMIN — FLUTICASONE PROPIONATE 2 SPRAY: 50 SPRAY, METERED NASAL at 09:10

## 2018-12-10 RX ADMIN — OXYCODONE AND ACETAMINOPHEN 2 TABLET: 5; 325 TABLET ORAL at 20:18

## 2018-12-10 RX ADMIN — CARVEDILOL 12.5 MG: 12.5 TABLET, FILM COATED ORAL at 09:08

## 2018-12-10 RX ADMIN — OXYCODONE AND ACETAMINOPHEN 1 TABLET: 5; 325 TABLET ORAL at 12:03

## 2018-12-10 RX ADMIN — FUROSEMIDE 20 MG: 10 INJECTION, SOLUTION INTRAMUSCULAR; INTRAVENOUS at 09:08

## 2018-12-10 RX ADMIN — Medication 10 ML: at 09:09

## 2018-12-10 RX ADMIN — FOLIC ACID 1 MG: 1 TABLET ORAL at 09:07

## 2018-12-10 RX ADMIN — ACETAMINOPHEN 325 MG: 325 TABLET, FILM COATED ORAL at 09:08

## 2018-12-10 RX ADMIN — CETIRIZINE HYDROCHLORIDE 10 MG: 10 TABLET, FILM COATED ORAL at 09:07

## 2018-12-10 RX ADMIN — LEVOTHYROXINE SODIUM 75 MCG: 75 TABLET ORAL at 06:09

## 2018-12-10 RX ADMIN — POTASSIUM CHLORIDE 40 MEQ: 20 TABLET, EXTENDED RELEASE ORAL at 20:17

## 2018-12-10 RX ADMIN — ACETAMINOPHEN 325 MG: 325 TABLET, FILM COATED ORAL at 20:18

## 2018-12-10 RX ADMIN — TRAMADOL HYDROCHLORIDE 50 MG: 50 TABLET, FILM COATED ORAL at 09:07

## 2018-12-10 RX ADMIN — NYSTATIN 500000 UNITS: 100000 SUSPENSION ORAL at 08:00

## 2018-12-10 RX ADMIN — NYSTATIN 500000 UNITS: 100000 SUSPENSION ORAL at 20:19

## 2018-12-10 RX ADMIN — DOCUSATE SODIUM 100 MG: 100 CAPSULE, LIQUID FILLED ORAL at 09:07

## 2018-12-10 RX ADMIN — PREGABALIN 50 MG: 50 CAPSULE ORAL at 09:08

## 2018-12-10 RX ADMIN — OXYCODONE AND ACETAMINOPHEN 2 TABLET: 5; 325 TABLET ORAL at 03:15

## 2018-12-10 RX ADMIN — MEROPENEM 1 G: 1 INJECTION, POWDER, FOR SOLUTION INTRAVENOUS at 02:30

## 2018-12-10 RX ADMIN — PREDNISONE 2 MG: 1 TABLET ORAL at 09:08

## 2018-12-10 RX ADMIN — VALACYCLOVIR HYDROCHLORIDE 500 MG: 500 TABLET, FILM COATED ORAL at 09:07

## 2018-12-10 RX ADMIN — IRON SUCROSE 200 MG: 20 INJECTION, SOLUTION INTRAVENOUS at 09:45

## 2018-12-10 RX ADMIN — NYSTATIN 500000 UNITS: 100000 SUSPENSION ORAL at 14:15

## 2018-12-10 RX ADMIN — CARVEDILOL 12.5 MG: 12.5 TABLET, FILM COATED ORAL at 20:18

## 2018-12-10 RX ADMIN — SPIRONOLACTONE 25 MG: 25 TABLET ORAL at 09:08

## 2018-12-10 RX ADMIN — OXYCODONE AND ACETAMINOPHEN 2 TABLET: 5; 325 TABLET ORAL at 16:08

## 2018-12-10 RX ADMIN — POTASSIUM CHLORIDE 40 MEQ: 20 TABLET, EXTENDED RELEASE ORAL at 09:07

## 2018-12-10 RX ADMIN — ERTAPENEM SODIUM 1000 MG: 1 INJECTION, POWDER, LYOPHILIZED, FOR SOLUTION INTRAMUSCULAR; INTRAVENOUS at 15:58

## 2018-12-10 RX ADMIN — ISOSORBIDE MONONITRATE 30 MG: 30 TABLET, EXTENDED RELEASE ORAL at 09:07

## 2018-12-10 RX ADMIN — MEROPENEM 1 G: 1 INJECTION, POWDER, FOR SOLUTION INTRAVENOUS at 09:06

## 2018-12-10 RX ADMIN — TRAMADOL HYDROCHLORIDE 50 MG: 50 TABLET, FILM COATED ORAL at 20:17

## 2018-12-10 ASSESSMENT — ENCOUNTER SYMPTOMS
COUGH: 0
SHORTNESS OF BREATH: 0
NAUSEA: 0
VOMITING: 0
DIARRHEA: 0

## 2018-12-10 ASSESSMENT — PAIN DESCRIPTION - PAIN TYPE: TYPE: ACUTE PAIN

## 2018-12-10 ASSESSMENT — PAIN SCALES - GENERAL
PAINLEVEL_OUTOF10: 6
PAINLEVEL_OUTOF10: 5
PAINLEVEL_OUTOF10: 4
PAINLEVEL_OUTOF10: 3
PAINLEVEL_OUTOF10: 6
PAINLEVEL_OUTOF10: 7
PAINLEVEL_OUTOF10: 5
PAINLEVEL_OUTOF10: 6
PAINLEVEL_OUTOF10: 5
PAINLEVEL_OUTOF10: 7

## 2018-12-10 ASSESSMENT — PAIN DESCRIPTION - LOCATION: LOCATION: KNEE

## 2018-12-10 ASSESSMENT — PAIN DESCRIPTION - ORIENTATION: ORIENTATION: RIGHT

## 2018-12-10 NOTE — DISCHARGE SUMMARY
[facility]  Dr. Collier Marrow  Discharge Summary    The Joe Shah is a 70 y.o. female underwent explant antibiotic spacer right knee procedure without complication. Joe Shah was admitted to the floor following her   recovery in the PACU.      Discharge Diagnosis   Right infected   Knee Replacement w serratia    Current Inpatient Medications    Current Facility-Administered Medications: potassium chloride (KLOR-CON M) extended release tablet 40 mEq, 40 mEq, Oral, BID  nystatin (MYCOSTATIN) 112259 UNIT/ML suspension 500,000 Units, 5 mL, Oral, 4x Daily  meropenem (MERREM) 1 g in sodium chloride 0.9 % 100 mL IVPB, 1 g, Intravenous, Q8H  salsalate (DISALCID) tablet 2,500 mg, 2,500 mg, Oral, Once per day on Mon Wed Fri Sat  salsalate (DISALCID) tablet 2,000 mg, 2,000 mg, Oral, Once per day on Sun Tue Thu  folic acid (FOLVITE) tablet 1 mg, 1 mg, Oral, Daily  cetirizine (ZYRTEC) tablet 10 mg, 10 mg, Oral, Daily  levothyroxine (SYNTHROID) tablet 75 mcg, 75 mcg, Oral, Daily  fluticasone (FLONASE) 50 MCG/ACT nasal spray 2 spray, 2 spray, Nasal, Daily  spironolactone (ALDACTONE) tablet 25 mg, 25 mg, Oral, Daily  vitamin D (ERGOCALCIFEROL) capsule 50,000 Units, 50,000 Units, Oral, Weekly  pregabalin (LYRICA) capsule 50 mg, 50 mg, Oral, Daily  valACYclovir (VALTREX) tablet 500 mg, 500 mg, Oral, Daily  pantoprazole (PROTONIX) tablet 40 mg, 40 mg, Oral, Daily  nitroGLYCERIN (NITROSTAT) SL tablet 0.4 mg, 0.4 mg, Sublingual, Q5 Min PRN  predniSONE (DELTASONE) tablet 2 mg, 2 mg, Oral, Daily  isosorbide mononitrate (IMDUR) extended release tablet 30 mg, 30 mg, Oral, Daily  docusate sodium (COLACE) capsule 100 mg, 100 mg, Oral, Daily  carvedilol (COREG) tablet 12.5 mg, 12.5 mg, Oral, BID  therapeutic multivitamin-minerals 1 tablet, 1 tablet, Oral, Daily  0.9 % sodium chloride bolus, 500 mL, Intravenous, PRN  sodium chloride flush 0.9 % injection 10 mL, 10 mL, Intravenous, 2 times per day  sodium pain remained under good control through their hospital stay. From a medical standpoint the patient remained stable and continued to have the medicine team follow throughout their stay. Acute postoperative blood loss anemia after joint replacement being monitored with daily hemoglobin/hematocrit. The patients dressing was changed/incison was checked on day of d/c. The patient will be discharged at this time to  Home with [address] [place] [address]   with their current diet restrictions and will continue to follow the total knee precautions outlined to them by us and PT/OT. Patient seen by ID, dr Ankita Carrera. Plan will be for 6 weeks IV meropenem and vanco.    Condition on Discharge: Stable    Plan  Followup at scheduled appointment time (1 month post-op). Patient was instructed on the use of pain medications, the signs and symptoms of infection, and was given our number to call should they have any questions or concerns following discharge.

## 2018-12-10 NOTE — PROGRESS NOTES
Subjective:     Post-Operative Day: 4 more sore and stiff. Walked lap around unit  2  Objective:     Patient Vitals for the past 24 hrs:   BP Temp Temp src Pulse Resp SpO2   12/10/18 0657 (!) 167/76 98.2 °F (36.8 °C) - 78 18 97 %   12/09/18 1926 (!) 143/66 95.6 °F (35.3 °C) Temporal 69 16 98 %       General: alert, appears stated age and cooperative   Wound: Wound clean, dry and intact and mod swelling  One drop blood inf 1/3 incision. qtr size area on bandage. Neurovascular: Exam normal   DVT Exam: No evidence of DVT seen on physical exam.         Data Review:  Recent Labs      12/09/18   0410  12/10/18   0425   HGB  7.2*  7.7*     Recent Labs      12/10/18   0425   NA  141   K  3.3*   CREATININE  0.5   GLUCOSE  109     No results for input(s): POCGLU in the last 72 hours. cxs serratia      Assessment:     Status Post right Total Knee Arthroplasty explant. Doing well postoperatively. . Acute postoperative blood loss anemia being monitored with daily hemoglobin/hematocrit. Plan:    Iv vanco and merrum  Pain control  Tight blood glucose control  PT/OT  DVT prophylaxis  Ice and elevate  Discharge Home with [address] [facility] tomorrow

## 2018-12-11 VITALS
HEIGHT: 66 IN | WEIGHT: 190 LBS | SYSTOLIC BLOOD PRESSURE: 125 MMHG | BODY MASS INDEX: 30.53 KG/M2 | DIASTOLIC BLOOD PRESSURE: 64 MMHG | OXYGEN SATURATION: 97 % | TEMPERATURE: 98.2 F | HEART RATE: 75 BPM | RESPIRATION RATE: 18 BRPM

## 2018-12-11 LAB
ANION GAP SERPL CALCULATED.3IONS-SCNC: 8 MMOL/L (ref 7–19)
BUN BLDV-MCNC: 8 MG/DL (ref 8–23)
CALCIUM SERPL-MCNC: 8.7 MG/DL (ref 8.8–10.2)
CHLORIDE BLD-SCNC: 104 MMOL/L (ref 98–111)
CO2: 32 MMOL/L (ref 22–29)
CREAT SERPL-MCNC: 0.5 MG/DL (ref 0.5–0.9)
GFR NON-AFRICAN AMERICAN: >60
GLUCOSE BLD-MCNC: 92 MG/DL (ref 74–109)
HCT VFR BLD CALC: 26.1 % (ref 37–47)
HEMOGLOBIN: 7.9 G/DL (ref 12–16)
MCH RBC QN AUTO: 28.5 PG (ref 27–31)
MCHC RBC AUTO-ENTMCNC: 30.3 G/DL (ref 33–37)
MCV RBC AUTO: 94.2 FL (ref 81–99)
PDW BLD-RTO: 15.9 % (ref 11.5–14.5)
PLATELET # BLD: 220 K/UL (ref 130–400)
PMV BLD AUTO: 10.5 FL (ref 9.4–12.3)
POTASSIUM SERPL-SCNC: 3.8 MMOL/L (ref 3.5–5)
RBC # BLD: 2.77 M/UL (ref 4.2–5.4)
SODIUM BLD-SCNC: 144 MMOL/L (ref 136–145)
WBC # BLD: 5.5 K/UL (ref 4.8–10.8)

## 2018-12-11 PROCEDURE — 2580000003 HC RX 258: Performed by: ORTHOPAEDIC SURGERY

## 2018-12-11 PROCEDURE — 6370000000 HC RX 637 (ALT 250 FOR IP): Performed by: INTERNAL MEDICINE

## 2018-12-11 PROCEDURE — 85027 COMPLETE CBC AUTOMATED: CPT

## 2018-12-11 PROCEDURE — 80048 BASIC METABOLIC PNL TOTAL CA: CPT

## 2018-12-11 PROCEDURE — 6370000000 HC RX 637 (ALT 250 FOR IP): Performed by: ORTHOPAEDIC SURGERY

## 2018-12-11 PROCEDURE — 6360000002 HC RX W HCPCS: Performed by: INTERNAL MEDICINE

## 2018-12-11 PROCEDURE — 2580000003 HC RX 258: Performed by: INTERNAL MEDICINE

## 2018-12-11 RX ADMIN — Medication 2000 MG: at 09:19

## 2018-12-11 RX ADMIN — FOLIC ACID 1 MG: 1 TABLET ORAL at 09:19

## 2018-12-11 RX ADMIN — PREGABALIN 50 MG: 50 CAPSULE ORAL at 09:19

## 2018-12-11 RX ADMIN — TRAMADOL HYDROCHLORIDE 50 MG: 50 TABLET, FILM COATED ORAL at 09:19

## 2018-12-11 RX ADMIN — DOCUSATE SODIUM 100 MG: 100 CAPSULE, LIQUID FILLED ORAL at 09:20

## 2018-12-11 RX ADMIN — MULTIPLE VITAMINS W/ MINERALS TAB 1 TABLET: TAB at 09:21

## 2018-12-11 RX ADMIN — ERTAPENEM SODIUM 1000 MG: 1 INJECTION, POWDER, LYOPHILIZED, FOR SOLUTION INTRAMUSCULAR; INTRAVENOUS at 14:08

## 2018-12-11 RX ADMIN — NYSTATIN 500000 UNITS: 100000 SUSPENSION ORAL at 14:08

## 2018-12-11 RX ADMIN — ACETAMINOPHEN 325 MG: 325 TABLET, FILM COATED ORAL at 09:19

## 2018-12-11 RX ADMIN — OXYCODONE AND ACETAMINOPHEN 2 TABLET: 5; 325 TABLET ORAL at 10:42

## 2018-12-11 RX ADMIN — OXYCODONE AND ACETAMINOPHEN 2 TABLET: 5; 325 TABLET ORAL at 04:33

## 2018-12-11 RX ADMIN — NYSTATIN 500000 UNITS: 100000 SUSPENSION ORAL at 09:19

## 2018-12-11 RX ADMIN — SPIRONOLACTONE 25 MG: 25 TABLET ORAL at 09:20

## 2018-12-11 RX ADMIN — VALACYCLOVIR HYDROCHLORIDE 500 MG: 500 TABLET, FILM COATED ORAL at 09:20

## 2018-12-11 RX ADMIN — LEVOTHYROXINE SODIUM 75 MCG: 75 TABLET ORAL at 06:42

## 2018-12-11 RX ADMIN — PANTOPRAZOLE SODIUM 40 MG: 40 TABLET, DELAYED RELEASE ORAL at 09:20

## 2018-12-11 RX ADMIN — PREDNISONE 2 MG: 1 TABLET ORAL at 09:19

## 2018-12-11 RX ADMIN — FLUTICASONE PROPIONATE 2 SPRAY: 50 SPRAY, METERED NASAL at 09:21

## 2018-12-11 RX ADMIN — Medication 10 ML: at 09:21

## 2018-12-11 RX ADMIN — ISOSORBIDE MONONITRATE 30 MG: 30 TABLET, EXTENDED RELEASE ORAL at 09:20

## 2018-12-11 RX ADMIN — CETIRIZINE HYDROCHLORIDE 10 MG: 10 TABLET, FILM COATED ORAL at 09:20

## 2018-12-11 RX ADMIN — CARVEDILOL 12.5 MG: 12.5 TABLET, FILM COATED ORAL at 09:20

## 2018-12-11 ASSESSMENT — ENCOUNTER SYMPTOMS
NAUSEA: 0
VOMITING: 0
DIARRHEA: 0
SHORTNESS OF BREATH: 0
COUGH: 0

## 2018-12-11 ASSESSMENT — PAIN SCALES - GENERAL
PAINLEVEL_OUTOF10: 2
PAINLEVEL_OUTOF10: 3
PAINLEVEL_OUTOF10: 3
PAINLEVEL_OUTOF10: 4
PAINLEVEL_OUTOF10: 9
PAINLEVEL_OUTOF10: 4
PAINLEVEL_OUTOF10: 7

## 2018-12-11 NOTE — PROGRESS NOTES
Infectious Diseases Progress Note    Patient:  Chago Monaco  YOB: 1955  MRN: [id number]   Admit date: 12/6/2018   Admitting Physician: Artemio Casiano [facility],*  Primary Care Physician: gY Blas MD    Chief Complaint/Interval History:  She has some soreness in the right knee and right lower extremity. She is without fever. Tolerating ertapenem without side effect. She has been approved for home IV ertapenem treatment. She is getting training on home IV antibiotics present. In/Out    Intake/Output Summary (Last 24 hours) at 12/11/18 1027  Last data filed at 12/11/18 0912   Gross per 24 hour   Intake              360 ml   Output                0 ml   Net              360 ml     Allergies:    Allergies   Allergen Reactions    Amoxicillin Rash    Lipitor Rash    Niaspan [Niacin Er] Rash    Penicillins Rash    Relafen [Nabumetone] Rash    Zocor [Simvastatin] Rash     Muscle cramps     Current Meds:   nystatin (MYCOSTATIN) 762608 UNIT/ML suspension 500,000 Units 4x Daily   ertapenem (INVANZ) 1 g IVPB minibag Q24H   salsalate (DISALCID) tablet 2,500 mg Once per day on Mon Wed Fri Sat   salsalate (DISALCID) tablet 2,000 mg Once per day on Sun Tue Thu   folic acid (FOLVITE) tablet 1 mg Daily   cetirizine (ZYRTEC) tablet 10 mg Daily   levothyroxine (SYNTHROID) tablet 75 mcg Daily   fluticasone (FLONASE) 50 MCG/ACT nasal spray 2 spray Daily   spironolactone (ALDACTONE) tablet 25 mg Daily   vitamin D (ERGOCALCIFEROL) capsule 50,000 Units Weekly   pregabalin (LYRICA) capsule 50 mg Daily   valACYclovir (VALTREX) tablet 500 mg Daily   pantoprazole (PROTONIX) tablet 40 mg Daily   nitroGLYCERIN (NITROSTAT) SL tablet 0.4 mg Q5 Min PRN   predniSONE (DELTASONE) tablet 2 mg Daily   isosorbide mononitrate (IMDUR) extended release tablet 30 mg Daily   docusate sodium (COLACE) capsule 100 mg Daily   carvedilol (COREG) tablet 12.5 mg BID   therapeutic multivitamin-minerals 1 tablet Daily   0.9 % sodium chloride

## 2018-12-11 NOTE — CARE COORDINATION
[facility] notified of patient discharge today, new IV abx orders and need for admission tomorrow. DC Summary and DC med list faxed.   Electronically signed by Angel Love RN on 12/11/18 at 11:55 AM

## 2018-12-11 NOTE — CARE COORDINATION
Kandi Retana spoke with Pt and has arranged her IV ABX; Pt to have her dose of ertapenem here today and then can d/c home.     Electronically signed by OLIVER Kelley on 12/11/2018 at 11:24 AM

## 2018-12-11 NOTE — PROGRESS NOTES
tablet 0.4 mg Q5 Min PRN   predniSONE (DELTASONE) tablet 2 mg Daily   isosorbide mononitrate (IMDUR) extended release tablet 30 mg Daily   carvedilol (COREG) tablet 12.5 mg BID   therapeutic multivitamin-minerals 1 tablet Daily   0.9 % sodium chloride bolus PRN   acetaminophen (TYLENOL) tablet 650 mg Q4H PRN   oxyCODONE-acetaminophen (PERCOCET) 5-325 MG per tablet 1 tablet Q4H PRN   Or    oxyCODONE-acetaminophen (PERCOCET) 5-325 MG per tablet 2 tablet Q4H PRN   morphine injection 2 mg Q1H PRN   Or    morphine injection 4 mg Q1H PRN   docusate sodium (COLACE) capsule 100 mg BID   magnesium hydroxide (MILK OF MAGNESIA) 400 MG/5ML suspension 30 mL Daily PRN   bisacodyl (DULCOLAX) suppository 10 mg Daily PRN   ondansetron (ZOFRAN) injection 4 mg Q6H PRN   rivaroxaban (XARELTO) tablet 10 mg Daily   lidocaine PF 1 % injection 5 mL Once   sodium chloride flush 0.9 % injection 10 mL 2 times per day   sodium chloride flush 0.9 % injection 10 mL PRN   traMADol (ULTRAM) tablet 50 mg BID   And    acetaminophen (TYLENOL) tablet 325 mg BID       Data:     Code Status:  Full Code    Family History   Problem Relation Age of Onset    Cancer Mother         LUNG CA    Heart Attack Father 39        FATAL MI       Social History     Social History    Marital status:      Spouse name: N/A    Number of children: N/A    Years of education: N/A     Occupational History    Not on file. Social History Main Topics    Smoking status: Never Smoker    Smokeless tobacco: Never Used    Alcohol use No    Drug use: No    Sexual activity: Not on file     Other Topics Concern    Not on file     Social History Narrative    No narrative on file       Vitals:  /64   Pulse 75   Temp 98.2 °F (36.8 °C)   Resp 18   Ht 5' 6\" (1.676 m)   Wt 190 lb (86.2 kg)   SpO2 97%   BMI 30.67 kg/m²   Temp (24hrs), Av °F (36.7 °C), Min:97.7 °F (36.5 °C), Max:98.2 °F (36.8 °C)             I/O (24Hr):     Intake/Output Summary (Last 24

## 2018-12-12 ENCOUNTER — CARE COORDINATION (OUTPATIENT)
Dept: CASE MANAGEMENT | Age: 63
End: 2018-12-12

## 2018-12-12 NOTE — CARE COORDINATION
[facility] Transitions Initial Follow Up Call    Call within 2 business days of discharge: Yes    Patient: Rosendo Lang Patient : 1955   MRN: [id number]  Reason for Admission: There are no discharge diagnoses documented for the most recent discharge. Discharge Date: 18 RARS: Readmission Risk Score: 23     Spoke with: [address]: [place] [address]      Non-face-to-face services provided:  Reviewed encounter information for continuity of care prior to follow up phone call - chart notes, consults, progress notes, test results, med list, appointments, AVS, other information. Care Transitions 24 Hour Call    Do you have any ongoing symptoms?:  No  Do you have a copy of your discharge instructions?:  Yes  Do you have all of your prescriptions and are they filled?:  Yes  Have you been contacted by a [employer])?:  No  Were you discharged with any Home Care or Post Acute Services:  Yes  Post Acute Services:  Home Health  Patient DME:  Ivelisse Olivares you have support at home?:  Partner/Spouse/SO  Do you feel like you have everything you need to keep you well at home?:  Yes  Are you an active caregiver in your home?:  No  Care Transitions Interventions         Follow Up : Spoke with patient on the initial follow up call after discharge. Patient states she is doing well, in some pain, but does have pain medications available to take. Medications were reviewed and reconciled. Patient was able to obtain medications prescribed at discharge, while discussing IV antibiotics, patient states that Merrem was daily infusion and it is listed at every 8 hours on discharge med list, will have to obtain clarification. She states [facility] has not been to the home for admission and visit. Incision site is clean, dry, and intact per patient. Will obtain clarification of antibiotic.  Lavinia Gonsalez RN, CTC  Future Appointments  Date Time Provider Troy Muniz

## 2018-12-14 ENCOUNTER — CARE COORDINATION (OUTPATIENT)
Dept: CASE MANAGEMENT | Age: 63
End: 2018-12-14

## 2018-12-14 NOTE — CARE COORDINATION
[facility] Transitions Follow Up Call    2018    Patient: Alexander Roberts  Patient : 1955   MRN: [id number]  Reason for Admission: There are no discharge diagnoses documented for the most recent discharge. Discharge Date: 18 RARS: Readmission Risk Score: 23       Spoke with: [facility] Transitions Subsequent and Final Call    Subsequent and Final Calls  Are you currently active with any services?:  Home Health  Care Transitions Interventions  Other Interventions: Follow Up : Spoke with patient regarding weight bearing status after consulting Dr. Consuelo Sloan, and informed her that she is to bear weight on surgical knee. Patient states site is looking good, some swelling, but no drainage or redness. Will follow up next week as indicated.  Asha Goodson, FIGUEROA, CTC      Future Appointments  Date Time Provider Troy Muniz   2019 9:30 AM Diane Dawson MD [facility]       Shea Brock RN

## 2018-12-18 ENCOUNTER — CARE COORDINATION (OUTPATIENT)
Dept: CASE MANAGEMENT | Age: 63
End: 2018-12-18

## 2018-12-18 LAB
ALBUMIN SERPL-MCNC: 3.1 G/DL (ref 3.5–5.2)
ALP BLD-CCNC: 101 U/L (ref 35–104)
ALT SERPL-CCNC: 9 U/L (ref 5–33)
ANION GAP SERPL CALCULATED.3IONS-SCNC: 10 MMOL/L (ref 7–19)
AST SERPL-CCNC: 15 U/L (ref 5–32)
BASOPHILS ABSOLUTE: 0.1 K/UL (ref 0–0.2)
BASOPHILS RELATIVE PERCENT: 0.8 % (ref 0–1)
BILIRUB SERPL-MCNC: 0.3 MG/DL (ref 0.2–1.2)
BUN BLDV-MCNC: 11 MG/DL (ref 8–23)
C-REACTIVE PROTEIN: 3.31 MG/DL (ref 0–0.5)
CALCIUM SERPL-MCNC: 9 MG/DL (ref 8.8–10.2)
CHLORIDE BLD-SCNC: 104 MMOL/L (ref 98–111)
CO2: 29 MMOL/L (ref 22–29)
CREAT SERPL-MCNC: 0.5 MG/DL (ref 0.5–0.9)
EOSINOPHILS ABSOLUTE: 0.1 K/UL (ref 0–0.6)
EOSINOPHILS RELATIVE PERCENT: 1.1 % (ref 0–5)
GFR NON-AFRICAN AMERICAN: >60
GLUCOSE BLD-MCNC: 92 MG/DL (ref 74–109)
HCT VFR BLD CALC: 29.5 % (ref 37–47)
HEMOGLOBIN: 9.1 G/DL (ref 12–16)
LYMPHOCYTES ABSOLUTE: 1.9 K/UL (ref 1.1–4.5)
LYMPHOCYTES RELATIVE PERCENT: 31.3 % (ref 20–40)
MCH RBC QN AUTO: 28.5 PG (ref 27–31)
MCHC RBC AUTO-ENTMCNC: 30.8 G/DL (ref 33–37)
MCV RBC AUTO: 92.5 FL (ref 81–99)
MONOCYTES ABSOLUTE: 0.7 K/UL (ref 0–0.9)
MONOCYTES RELATIVE PERCENT: 11 % (ref 0–10)
NEUTROPHILS ABSOLUTE: 3.4 K/UL (ref 1.5–7.5)
NEUTROPHILS RELATIVE PERCENT: 55.3 % (ref 50–65)
PDW BLD-RTO: 17.2 % (ref 11.5–14.5)
PLATELET # BLD: 276 K/UL (ref 130–400)
PMV BLD AUTO: 9.9 FL (ref 9.4–12.3)
POTASSIUM SERPL-SCNC: 3.7 MMOL/L (ref 3.5–5)
RBC # BLD: 3.19 M/UL (ref 4.2–5.4)
SEDIMENTATION RATE, ERYTHROCYTE: 96 MM/HR (ref 0–25)
SODIUM BLD-SCNC: 143 MMOL/L (ref 136–145)
TOTAL PROTEIN: 7.1 G/DL (ref 6.6–8.7)
WBC # BLD: 6.1 K/UL (ref 4.8–10.8)

## 2018-12-18 NOTE — CARE COORDINATION
[facility] Transitions Follow Up Call    2018    Patient: Shelly Sosa  Patient : 1955   MRN: [id number]  Reason for Admission: There are no discharge diagnoses documented for the most recent discharge. Discharge Date: 18 RARS: Readmission Risk Score: 21       Spoke with: N/A    Care Transitions Subsequent and Final Call    Subsequent and Final Calls  Are you currently active with any services?:  Home Health  Care Transitions Interventions  Other Interventions: Follow Up : Placed follow up call to patient, not available. Will try again at a later time.  Josiah Fields, RN, CTC  Future Appointments  Date Time Provider Troy Muniz   2019 9:30 AM Cristy Rodriguez MD [facility]       Julián Schmid RN

## 2018-12-19 ENCOUNTER — HOSPITAL ENCOUNTER (OUTPATIENT)
Dept: ULTRASOUND IMAGING | Facility: HOSPITAL | Age: 63
Discharge: HOME | End: 2018-12-19
Attending: INTERNAL MEDICINE | Admitting: INTERNAL MEDICINE

## 2018-12-19 ENCOUNTER — CARE COORDINATION (OUTPATIENT)
Dept: CASE MANAGEMENT | Age: 63
End: 2018-12-19

## 2018-12-19 ENCOUNTER — TRANSCRIBE ORDERS (OUTPATIENT)
Dept: ADMINISTRATIVE | Facility: HOSPITAL | Age: 63
End: 2018-12-19

## 2018-12-19 DIAGNOSIS — R60.0 LOCALIZED EDEMA: ICD-10-CM

## 2018-12-19 DIAGNOSIS — T84.53XA INFECTION OF TOTAL RIGHT KNEE REPLACEMENT, INITIAL ENCOUNTER (HCC): Primary | ICD-10-CM

## 2018-12-19 PROCEDURE — 93971 EXTREMITY STUDY: CPT | Performed by: SURGERY

## 2018-12-19 PROCEDURE — 93971 EXTREMITY STUDY: CPT

## 2018-12-20 ENCOUNTER — CARE COORDINATION (OUTPATIENT)
Dept: CASE MANAGEMENT | Age: 63
End: 2018-12-20

## 2018-12-24 LAB
ALBUMIN SERPL-MCNC: 3.1 G/DL (ref 3.5–5.2)
ALP BLD-CCNC: 92 U/L (ref 35–104)
ALT SERPL-CCNC: 11 U/L (ref 5–33)
ANION GAP SERPL CALCULATED.3IONS-SCNC: 8 MMOL/L (ref 7–19)
AST SERPL-CCNC: 17 U/L (ref 5–32)
BASOPHILS ABSOLUTE: 0 K/UL (ref 0–0.2)
BASOPHILS RELATIVE PERCENT: 0.8 % (ref 0–1)
BILIRUB SERPL-MCNC: 0.4 MG/DL (ref 0.2–1.2)
BUN BLDV-MCNC: 15 MG/DL (ref 8–23)
C-REACTIVE PROTEIN: 0.69 MG/DL (ref 0–0.5)
CALCIUM SERPL-MCNC: 8.6 MG/DL (ref 8.8–10.2)
CHLORIDE BLD-SCNC: 104 MMOL/L (ref 98–111)
CO2: 31 MMOL/L (ref 22–29)
CREAT SERPL-MCNC: 0.6 MG/DL (ref 0.5–0.9)
EOSINOPHILS ABSOLUTE: 0.1 K/UL (ref 0–0.6)
EOSINOPHILS RELATIVE PERCENT: 1 % (ref 0–5)
GFR NON-AFRICAN AMERICAN: >60
GLUCOSE BLD-MCNC: 92 MG/DL (ref 74–109)
HCT VFR BLD CALC: 30.1 % (ref 37–47)
HEMOGLOBIN: 9.3 G/DL (ref 12–16)
LYMPHOCYTES ABSOLUTE: 2.1 K/UL (ref 1.1–4.5)
LYMPHOCYTES RELATIVE PERCENT: 41 % (ref 20–40)
MCH RBC QN AUTO: 28.4 PG (ref 27–31)
MCHC RBC AUTO-ENTMCNC: 30.9 G/DL (ref 33–37)
MCV RBC AUTO: 91.8 FL (ref 81–99)
MONOCYTES ABSOLUTE: 0.6 K/UL (ref 0–0.9)
MONOCYTES RELATIVE PERCENT: 11.7 % (ref 0–10)
NEUTROPHILS ABSOLUTE: 2.3 K/UL (ref 1.5–7.5)
NEUTROPHILS RELATIVE PERCENT: 45.3 % (ref 50–65)
PDW BLD-RTO: 18.1 % (ref 11.5–14.5)
PLATELET # BLD: 239 K/UL (ref 130–400)
PMV BLD AUTO: 10.1 FL (ref 9.4–12.3)
POTASSIUM SERPL-SCNC: 3.4 MMOL/L (ref 3.5–5)
RBC # BLD: 3.28 M/UL (ref 4.2–5.4)
SEDIMENTATION RATE, ERYTHROCYTE: 43 MM/HR (ref 0–25)
SODIUM BLD-SCNC: 143 MMOL/L (ref 136–145)
TOTAL PROTEIN: 6.4 G/DL (ref 6.6–8.7)
WBC # BLD: 5 K/UL (ref 4.8–10.8)

## 2018-12-27 ENCOUNTER — CARE COORDINATION (OUTPATIENT)
Dept: CASE MANAGEMENT | Age: 63
End: 2018-12-27

## 2018-12-27 NOTE — CARE COORDINATION
[facility] Transitions Follow Up Call    2018    Patient: Alcira Cobb  Patient : 1955   MRN: [id number]  Reason for Admission: There are no discharge diagnoses documented for the most recent discharge. Discharge Date: 18 RARS: Readmission Risk Score: 23       Spoke with: [address] Transitions Subsequent and Final Call    Subsequent and Final Calls  Are you currently active with any services?:  Home Health  Care Transitions Interventions  Other Interventions: Follow Up : Spoke with patient for final CTC call. Patient states she is doing well,  is doing infusions with [facility] coming weekly. She was notified this was our final call, and we would be ending our CTC.  Bre Vallecillo RN, CTC  Future Appointments  Date Time Provider Troy Muniz   2019 9:30 AM Ricardo Wynne MD [facility]       José Luis Ferro RN

## 2018-12-31 LAB
ALBUMIN SERPL-MCNC: 3.4 G/DL (ref 3.5–5.2)
ALP BLD-CCNC: 97 U/L (ref 35–104)
ALT SERPL-CCNC: 11 U/L (ref 5–33)
ANION GAP SERPL CALCULATED.3IONS-SCNC: 12 MMOL/L (ref 7–19)
AST SERPL-CCNC: 17 U/L (ref 5–32)
BASOPHILS ABSOLUTE: 0 K/UL (ref 0–0.2)
BASOPHILS RELATIVE PERCENT: 0.8 % (ref 0–1)
BILIRUB SERPL-MCNC: 0.3 MG/DL (ref 0.2–1.2)
BUN BLDV-MCNC: 17 MG/DL (ref 8–23)
C-REACTIVE PROTEIN: 0.85 MG/DL (ref 0–0.5)
CALCIUM SERPL-MCNC: 8.6 MG/DL (ref 8.8–10.2)
CHLORIDE BLD-SCNC: 101 MMOL/L (ref 98–111)
CO2: 32 MMOL/L (ref 22–29)
CREAT SERPL-MCNC: 0.8 MG/DL (ref 0.5–0.9)
EOSINOPHILS ABSOLUTE: 0.1 K/UL (ref 0–0.6)
EOSINOPHILS RELATIVE PERCENT: 1.8 % (ref 0–5)
GFR NON-AFRICAN AMERICAN: >60
GLUCOSE BLD-MCNC: 113 MG/DL (ref 74–109)
HCT VFR BLD CALC: 34.1 % (ref 37–47)
HEMOGLOBIN: 10.5 G/DL (ref 12–16)
LYMPHOCYTES ABSOLUTE: 2 K/UL (ref 1.1–4.5)
LYMPHOCYTES RELATIVE PERCENT: 40 % (ref 20–40)
MCH RBC QN AUTO: 28.5 PG (ref 27–31)
MCHC RBC AUTO-ENTMCNC: 30.8 G/DL (ref 33–37)
MCV RBC AUTO: 92.7 FL (ref 81–99)
MONOCYTES ABSOLUTE: 0.6 K/UL (ref 0–0.9)
MONOCYTES RELATIVE PERCENT: 12 % (ref 0–10)
NEUTROPHILS ABSOLUTE: 2.3 K/UL (ref 1.5–7.5)
NEUTROPHILS RELATIVE PERCENT: 45.2 % (ref 50–65)
PDW BLD-RTO: 18.4 % (ref 11.5–14.5)
PLATELET # BLD: 246 K/UL (ref 130–400)
PMV BLD AUTO: 10.7 FL (ref 9.4–12.3)
POTASSIUM SERPL-SCNC: 3.1 MMOL/L (ref 3.5–5)
RBC # BLD: 3.68 M/UL (ref 4.2–5.4)
SEDIMENTATION RATE, ERYTHROCYTE: 26 MM/HR (ref 0–25)
SODIUM BLD-SCNC: 145 MMOL/L (ref 136–145)
TOTAL PROTEIN: 6.4 G/DL (ref 6.6–8.7)
WBC # BLD: 5.1 K/UL (ref 4.8–10.8)

## 2019-01-03 ENCOUNTER — TELEPHONE (OUTPATIENT)
Dept: INPATIENT UNIT | Age: 64
End: 2019-01-03

## 2019-01-07 LAB
ALBUMIN SERPL-MCNC: 3.4 G/DL (ref 3.5–5.2)
ALP BLD-CCNC: 107 U/L (ref 35–104)
ALT SERPL-CCNC: 9 U/L (ref 5–33)
ANION GAP SERPL CALCULATED.3IONS-SCNC: 9 MMOL/L (ref 7–19)
AST SERPL-CCNC: 16 U/L (ref 5–32)
BASOPHILS ABSOLUTE: 0.1 K/UL (ref 0–0.2)
BASOPHILS RELATIVE PERCENT: 1.2 % (ref 0–1)
BILIRUB SERPL-MCNC: 0.4 MG/DL (ref 0.2–1.2)
BUN BLDV-MCNC: 12 MG/DL (ref 8–23)
C-REACTIVE PROTEIN: 1.24 MG/DL (ref 0–0.5)
CALCIUM SERPL-MCNC: 9.3 MG/DL (ref 8.8–10.2)
CHLORIDE BLD-SCNC: 105 MMOL/L (ref 98–111)
CO2: 29 MMOL/L (ref 22–29)
CREAT SERPL-MCNC: 0.6 MG/DL (ref 0.5–0.9)
EOSINOPHILS ABSOLUTE: 0.1 K/UL (ref 0–0.6)
EOSINOPHILS RELATIVE PERCENT: 3.3 % (ref 0–5)
GFR NON-AFRICAN AMERICAN: >60
GLUCOSE BLD-MCNC: 90 MG/DL (ref 74–109)
HCT VFR BLD CALC: 33.8 % (ref 37–47)
HEMOGLOBIN: 10.6 G/DL (ref 12–16)
LYMPHOCYTES ABSOLUTE: 2 K/UL (ref 1.1–4.5)
LYMPHOCYTES RELATIVE PERCENT: 47.2 % (ref 20–40)
MCH RBC QN AUTO: 29 PG (ref 27–31)
MCHC RBC AUTO-ENTMCNC: 31.4 G/DL (ref 33–37)
MCV RBC AUTO: 92.3 FL (ref 81–99)
MONOCYTES ABSOLUTE: 0.4 K/UL (ref 0–0.9)
MONOCYTES RELATIVE PERCENT: 10 % (ref 0–10)
NEUTROPHILS ABSOLUTE: 1.6 K/UL (ref 1.5–7.5)
NEUTROPHILS RELATIVE PERCENT: 37.8 % (ref 50–65)
PDW BLD-RTO: 17.6 % (ref 11.5–14.5)
PLATELET # BLD: 218 K/UL (ref 130–400)
PMV BLD AUTO: 11 FL (ref 9.4–12.3)
POTASSIUM SERPL-SCNC: 4.4 MMOL/L (ref 3.5–5)
RBC # BLD: 3.66 M/UL (ref 4.2–5.4)
SEDIMENTATION RATE, ERYTHROCYTE: 37 MM/HR (ref 0–25)
SODIUM BLD-SCNC: 143 MMOL/L (ref 136–145)
TOTAL PROTEIN: 6.5 G/DL (ref 6.6–8.7)
WBC # BLD: 4.3 K/UL (ref 4.8–10.8)

## 2019-01-08 LAB
FUNGUS (MYCOLOGY) CULTURE: NORMAL
KOH PREP: NORMAL

## 2019-01-09 ENCOUNTER — TELEPHONE (OUTPATIENT)
Dept: OBSTETRICS AND GYNECOLOGY | Facility: CLINIC | Age: 64
End: 2019-01-09

## 2019-01-09 NOTE — TELEPHONE ENCOUNTER
LM FOR PT TO CALL BACK AND LET ME KNOW STATUS OF PROLIA INJECTION ( DOES SHE STILL NEED TO WAIT, DOESN'T WANT IT ANY MORE, ECT)

## 2019-01-10 ENCOUNTER — TELEPHONE (OUTPATIENT)
Dept: CARDIOLOGY | Age: 64
End: 2019-01-10

## 2019-01-11 RX ORDER — CLOPIDOGREL BISULFATE 75 MG/1
75 TABLET ORAL DAILY
Qty: 30 TABLET | Refills: 5 | Status: SHIPPED | OUTPATIENT
Start: 2019-01-11 | End: 2019-04-20 | Stop reason: SDUPTHER

## 2019-01-14 LAB
ALBUMIN SERPL-MCNC: 3.3 G/DL (ref 3.5–5.2)
ALP BLD-CCNC: 101 U/L (ref 35–104)
ALT SERPL-CCNC: 10 U/L (ref 5–33)
ANION GAP SERPL CALCULATED.3IONS-SCNC: 8 MMOL/L (ref 7–19)
AST SERPL-CCNC: 17 U/L (ref 5–32)
BASOPHILS ABSOLUTE: 0.1 K/UL (ref 0–0.2)
BASOPHILS RELATIVE PERCENT: 1.4 % (ref 0–1)
BILIRUB SERPL-MCNC: 0.3 MG/DL (ref 0.2–1.2)
BUN BLDV-MCNC: 13 MG/DL (ref 8–23)
C-REACTIVE PROTEIN: 0.6 MG/DL (ref 0–0.5)
CALCIUM SERPL-MCNC: 9 MG/DL (ref 8.8–10.2)
CHLORIDE BLD-SCNC: 109 MMOL/L (ref 98–111)
CO2: 27 MMOL/L (ref 22–29)
CREAT SERPL-MCNC: 0.5 MG/DL (ref 0.5–0.9)
EOSINOPHILS ABSOLUTE: 0.2 K/UL (ref 0–0.6)
EOSINOPHILS RELATIVE PERCENT: 4.5 % (ref 0–5)
GFR NON-AFRICAN AMERICAN: >60
GLUCOSE BLD-MCNC: 121 MG/DL (ref 74–109)
HCT VFR BLD CALC: 35 % (ref 37–47)
HEMOGLOBIN: 10.6 G/DL (ref 12–16)
LYMPHOCYTES ABSOLUTE: 1.7 K/UL (ref 1.1–4.5)
LYMPHOCYTES RELATIVE PERCENT: 48.2 % (ref 20–40)
MCH RBC QN AUTO: 28.6 PG (ref 27–31)
MCHC RBC AUTO-ENTMCNC: 30.3 G/DL (ref 33–37)
MCV RBC AUTO: 94.3 FL (ref 81–99)
MONOCYTES ABSOLUTE: 0.5 K/UL (ref 0–0.9)
MONOCYTES RELATIVE PERCENT: 13.1 % (ref 0–10)
NEUTROPHILS ABSOLUTE: 1.2 K/UL (ref 1.5–7.5)
NEUTROPHILS RELATIVE PERCENT: 32.5 % (ref 50–65)
PDW BLD-RTO: 16.7 % (ref 11.5–14.5)
PLATELET # BLD: 192 K/UL (ref 130–400)
PMV BLD AUTO: 10.5 FL (ref 9.4–12.3)
POTASSIUM SERPL-SCNC: 4 MMOL/L (ref 3.5–5)
RBC # BLD: 3.71 M/UL (ref 4.2–5.4)
SEDIMENTATION RATE, ERYTHROCYTE: 23 MM/HR (ref 0–25)
SODIUM BLD-SCNC: 144 MMOL/L (ref 136–145)
TOTAL PROTEIN: 6.4 G/DL (ref 6.6–8.7)
WBC # BLD: 3.6 K/UL (ref 4.8–10.8)

## 2019-01-23 ENCOUNTER — TRANSCRIBE ORDERS (OUTPATIENT)
Dept: ADMINISTRATIVE | Facility: HOSPITAL | Age: 64
End: 2019-01-23

## 2019-01-23 ENCOUNTER — APPOINTMENT (OUTPATIENT)
Dept: LAB | Facility: HOSPITAL | Age: 64
End: 2019-01-23
Attending: INTERNAL MEDICINE

## 2019-01-23 DIAGNOSIS — T84.53XA INFECTION OF TOTAL RIGHT KNEE REPLACEMENT, INITIAL ENCOUNTER (HCC): Primary | ICD-10-CM

## 2019-01-23 LAB
ALBUMIN SERPL-MCNC: 3.7 G/DL (ref 3.5–5)
ALBUMIN/GLOB SERPL: 1.1 G/DL (ref 1.1–2.5)
ALP SERPL-CCNC: 101 U/L (ref 24–120)
ALT SERPL W P-5'-P-CCNC: 19 U/L (ref 0–54)
ANION GAP SERPL CALCULATED.3IONS-SCNC: 8 MMOL/L (ref 4–13)
AST SERPL-CCNC: 37 U/L (ref 7–45)
BASOPHILS # BLD AUTO: 0.05 10*3/MM3 (ref 0–0.2)
BASOPHILS NFR BLD AUTO: 1.2 % (ref 0–2)
BILIRUB SERPL-MCNC: 0.4 MG/DL (ref 0.1–1)
BUN BLD-MCNC: 19 MG/DL (ref 5–21)
BUN/CREAT SERPL: 29.2 (ref 7–25)
CALCIUM SPEC-SCNC: 9.2 MG/DL (ref 8.4–10.4)
CHLORIDE SERPL-SCNC: 106 MMOL/L (ref 98–110)
CO2 SERPL-SCNC: 26 MMOL/L (ref 24–31)
CREAT BLD-MCNC: 0.65 MG/DL (ref 0.5–1.4)
CRP SERPL-MCNC: 1.16 MG/DL (ref 0–0.99)
DEPRECATED RDW RBC AUTO: 55.5 FL (ref 40–54)
EOSINOPHIL # BLD AUTO: 0.29 10*3/MM3 (ref 0–0.7)
EOSINOPHIL NFR BLD AUTO: 6.9 % (ref 0–4)
ERYTHROCYTE [DISTWIDTH] IN BLOOD BY AUTOMATED COUNT: 16.5 % (ref 12–15)
ERYTHROCYTE [SEDIMENTATION RATE] IN BLOOD: 8 MM/HR (ref 0–20)
GFR SERPL CREATININE-BSD FRML MDRD: 91 ML/MIN/1.73
GLOBULIN UR ELPH-MCNC: 3.5 GM/DL
GLUCOSE BLD-MCNC: 86 MG/DL (ref 70–100)
HCT VFR BLD AUTO: 33.6 % (ref 37–47)
HGB BLD-MCNC: 10.7 G/DL (ref 12–16)
IMM GRANULOCYTES # BLD AUTO: 0.01 10*3/MM3 (ref 0–0.03)
IMM GRANULOCYTES NFR BLD AUTO: 0.2 % (ref 0–5)
LYMPHOCYTES # BLD AUTO: 2.02 10*3/MM3 (ref 0.72–4.86)
LYMPHOCYTES NFR BLD AUTO: 48 % (ref 15–45)
MCH RBC QN AUTO: 29 PG (ref 28–32)
MCHC RBC AUTO-ENTMCNC: 31.8 G/DL (ref 33–36)
MCV RBC AUTO: 91.1 FL (ref 82–98)
MONOCYTES # BLD AUTO: 0.54 10*3/MM3 (ref 0.19–1.3)
MONOCYTES NFR BLD AUTO: 12.8 % (ref 4–12)
NEUTROPHILS # BLD AUTO: 1.3 10*3/MM3 (ref 1.87–8.4)
NEUTROPHILS NFR BLD AUTO: 30.9 % (ref 39–78)
NRBC BLD AUTO-RTO: 0 /100 WBC (ref 0–0)
PLATELET # BLD AUTO: 199 10*3/MM3 (ref 130–400)
PMV BLD AUTO: 10.6 FL (ref 6–12)
POTASSIUM BLD-SCNC: 4 MMOL/L (ref 3.5–5.3)
PROT SERPL-MCNC: 7.2 G/DL (ref 6.3–8.7)
RBC # BLD AUTO: 3.69 10*6/MM3 (ref 4.2–5.4)
SODIUM BLD-SCNC: 140 MMOL/L (ref 135–145)
WBC NRBC COR # BLD: 4.21 10*3/MM3 (ref 4.8–10.8)

## 2019-01-23 PROCEDURE — 80053 COMPREHEN METABOLIC PANEL: CPT | Performed by: INTERNAL MEDICINE

## 2019-01-23 PROCEDURE — 36415 COLL VENOUS BLD VENIPUNCTURE: CPT

## 2019-01-23 PROCEDURE — 85651 RBC SED RATE NONAUTOMATED: CPT | Performed by: INTERNAL MEDICINE

## 2019-01-23 PROCEDURE — 86140 C-REACTIVE PROTEIN: CPT | Performed by: INTERNAL MEDICINE

## 2019-01-23 PROCEDURE — 85025 COMPLETE CBC W/AUTO DIFF WBC: CPT | Performed by: INTERNAL MEDICINE

## 2019-02-08 ENCOUNTER — TELEPHONE (OUTPATIENT)
Dept: CARDIOLOGY | Age: 64
End: 2019-02-08

## 2019-02-18 ENCOUNTER — LAB (OUTPATIENT)
Dept: LAB | Facility: HOSPITAL | Age: 64
End: 2019-02-18

## 2019-02-18 ENCOUNTER — TRANSCRIBE ORDERS (OUTPATIENT)
Dept: ADMINISTRATIVE | Facility: HOSPITAL | Age: 64
End: 2019-02-18

## 2019-02-18 DIAGNOSIS — T84.53XA INFECTION OF TOTAL RIGHT KNEE REPLACEMENT, INITIAL ENCOUNTER (HCC): ICD-10-CM

## 2019-02-18 DIAGNOSIS — D72.819 LEUKOPENIA, UNSPECIFIED TYPE: ICD-10-CM

## 2019-02-18 LAB
ALBUMIN SERPL-MCNC: 3.5 G/DL (ref 3.5–5)
ALBUMIN/GLOB SERPL: 0.9 G/DL (ref 1.1–2.5)
ALP SERPL-CCNC: 110 U/L (ref 24–120)
ALT SERPL W P-5'-P-CCNC: 31 U/L (ref 0–54)
ANION GAP SERPL CALCULATED.3IONS-SCNC: 8 MMOL/L (ref 4–13)
AST SERPL-CCNC: 67 U/L (ref 7–45)
AUTO MIXED CELLS #: 0.5 10*3/MM3 (ref 0.1–2.6)
AUTO MIXED CELLS %: 16.7 % (ref 0.1–24)
BILIRUB SERPL-MCNC: 0.3 MG/DL (ref 0.1–1)
BUN BLD-MCNC: 18 MG/DL (ref 5–21)
BUN/CREAT SERPL: 18.8
CALCIUM SPEC-SCNC: 9 MG/DL (ref 8.4–10.4)
CHLORIDE SERPL-SCNC: 104 MMOL/L (ref 98–110)
CO2 SERPL-SCNC: 28 MMOL/L (ref 24–31)
CREAT BLD-MCNC: 0.96 MG/DL (ref 0.5–1.4)
CRP SERPL-MCNC: 0.61 MG/DL (ref 0–0.99)
ERYTHROCYTE [DISTWIDTH] IN BLOOD BY AUTOMATED COUNT: 16.5 % (ref 12–15)
GFR SERPL CREATININE-BSD FRML MDRD: 58 ML/MIN/1.73
GLOBULIN UR ELPH-MCNC: 3.9 GM/DL
GLUCOSE BLD-MCNC: 78 MG/DL (ref 70–100)
HCT VFR BLD AUTO: 36.9 % (ref 37–47)
HGB BLD-MCNC: 12 G/DL (ref 12–16)
LYMPHOCYTES # BLD AUTO: 1.6 10*3/MM3 (ref 0.8–7)
LYMPHOCYTES NFR BLD AUTO: 57.5 % (ref 15–45)
MCH RBC QN AUTO: 29.3 PG (ref 28–32)
MCHC RBC AUTO-ENTMCNC: 32.5 G/DL (ref 33–36)
MCV RBC AUTO: 90.2 FL (ref 82–98)
NEUTROPHILS # BLD AUTO: 0.6 10*3/MM3 (ref 1.5–8.3)
NEUTROPHILS NFR BLD AUTO: 25.8 % (ref 39–78)
PLATELET # BLD AUTO: 146 10*3/MM3 (ref 130–400)
PMV BLD AUTO: 9.5 FL (ref 6–12)
POTASSIUM BLD-SCNC: 4.5 MMOL/L (ref 3.5–5.3)
PROT SERPL-MCNC: 7.4 G/DL (ref 6.3–8.7)
RBC # BLD AUTO: 4.09 10*6/MM3 (ref 4.2–5.4)
SODIUM BLD-SCNC: 140 MMOL/L (ref 135–145)
WBC NRBC COR # BLD: 2.7 10*3/MM3 (ref 4.8–10.8)

## 2019-02-18 PROCEDURE — 86140 C-REACTIVE PROTEIN: CPT | Performed by: INTERNAL MEDICINE

## 2019-02-18 PROCEDURE — 80053 COMPREHEN METABOLIC PANEL: CPT

## 2019-02-18 PROCEDURE — 85025 COMPLETE CBC W/AUTO DIFF WBC: CPT

## 2019-02-18 PROCEDURE — 36415 COLL VENOUS BLD VENIPUNCTURE: CPT

## 2019-02-20 ENCOUNTER — TRANSCRIBE ORDERS (OUTPATIENT)
Dept: ADMINISTRATIVE | Facility: HOSPITAL | Age: 64
End: 2019-02-20

## 2019-02-20 ENCOUNTER — LAB (OUTPATIENT)
Dept: LAB | Facility: HOSPITAL | Age: 64
End: 2019-02-20

## 2019-02-20 DIAGNOSIS — D72.819 LEUKOPENIA, UNSPECIFIED TYPE: ICD-10-CM

## 2019-02-20 DIAGNOSIS — T84.53XA INFECTION OF TOTAL RIGHT KNEE REPLACEMENT, INITIAL ENCOUNTER (HCC): ICD-10-CM

## 2019-02-20 DIAGNOSIS — T84.53XA INFECTION OF TOTAL RIGHT KNEE REPLACEMENT, INITIAL ENCOUNTER (HCC): Primary | ICD-10-CM

## 2019-02-20 LAB
ANION GAP SERPL CALCULATED.3IONS-SCNC: 8 MMOL/L (ref 4–13)
AUTO MIXED CELLS #: 0.5 10*3/MM3 (ref 0.1–2.6)
AUTO MIXED CELLS %: 12.2 % (ref 0.1–24)
BUN BLD-MCNC: 20 MG/DL (ref 5–21)
BUN/CREAT SERPL: 19.2
CALCIUM SPEC-SCNC: 9 MG/DL (ref 8.4–10.4)
CHLORIDE SERPL-SCNC: 103 MMOL/L (ref 98–110)
CO2 SERPL-SCNC: 28 MMOL/L (ref 24–31)
CREAT BLD-MCNC: 1.04 MG/DL (ref 0.5–1.4)
ERYTHROCYTE [DISTWIDTH] IN BLOOD BY AUTOMATED COUNT: 15.9 % (ref 12–15)
FOLATE SERPL-MCNC: >20 NG/ML (ref 4.78–24.2)
GFR SERPL CREATININE-BSD FRML MDRD: 53 ML/MIN/1.73
GLUCOSE BLD-MCNC: 89 MG/DL (ref 70–100)
HCT VFR BLD AUTO: 37 % (ref 37–47)
HGB BLD-MCNC: 12.2 G/DL (ref 12–16)
LYMPHOCYTES # BLD AUTO: 2.3 10*3/MM3 (ref 0.8–7)
LYMPHOCYTES NFR BLD AUTO: 54.6 % (ref 15–45)
MCH RBC QN AUTO: 29.5 PG (ref 28–32)
MCHC RBC AUTO-ENTMCNC: 33 G/DL (ref 33–36)
MCV RBC AUTO: 89.6 FL (ref 82–98)
NEUTROPHILS # BLD AUTO: 1.5 10*3/MM3 (ref 1.5–8.3)
NEUTROPHILS NFR BLD AUTO: 33.2 % (ref 39–78)
PLATELET # BLD AUTO: 140 10*3/MM3 (ref 130–400)
PMV BLD AUTO: 10 FL (ref 6–12)
POTASSIUM BLD-SCNC: 4.4 MMOL/L (ref 3.5–5.3)
RBC # BLD AUTO: 4.13 10*6/MM3 (ref 4.2–5.4)
SODIUM BLD-SCNC: 139 MMOL/L (ref 135–145)
VIT B12 BLD-MCNC: 351 PG/ML (ref 239–931)
WBC NRBC COR # BLD: 4.3 10*3/MM3 (ref 4.8–10.8)

## 2019-02-20 PROCEDURE — 82746 ASSAY OF FOLIC ACID SERUM: CPT | Performed by: INTERNAL MEDICINE

## 2019-02-20 PROCEDURE — 82607 VITAMIN B-12: CPT | Performed by: INTERNAL MEDICINE

## 2019-02-20 PROCEDURE — 85025 COMPLETE CBC W/AUTO DIFF WBC: CPT

## 2019-02-20 PROCEDURE — 36415 COLL VENOUS BLD VENIPUNCTURE: CPT

## 2019-02-20 PROCEDURE — 80048 BASIC METABOLIC PNL TOTAL CA: CPT

## 2019-03-13 ENCOUNTER — HOSPITAL ENCOUNTER (OUTPATIENT)
Dept: BONE DENSITY | Facility: HOSPITAL | Age: 64
Discharge: HOME | End: 2019-03-13

## 2019-03-13 ENCOUNTER — OFFICE VISIT (OUTPATIENT)
Dept: OBSTETRICS AND GYNECOLOGY | Facility: CLINIC | Age: 64
End: 2019-03-13

## 2019-03-13 ENCOUNTER — HOSPITAL ENCOUNTER (OUTPATIENT)
Dept: MAMMOGRAPHY | Facility: HOSPITAL | Age: 64
Discharge: HOME | End: 2019-03-13
Admitting: OBSTETRICS & GYNECOLOGY

## 2019-03-13 VITALS
BODY MASS INDEX: 30.86 KG/M2 | DIASTOLIC BLOOD PRESSURE: 72 MMHG | WEIGHT: 192 LBS | HEIGHT: 66 IN | SYSTOLIC BLOOD PRESSURE: 110 MMHG

## 2019-03-13 DIAGNOSIS — Z01.419 ENCOUNTER FOR GYNECOLOGICAL EXAMINATION WITHOUT ABNORMAL FINDING: ICD-10-CM

## 2019-03-13 DIAGNOSIS — Z12.31 BREAST CANCER SCREENING BY MAMMOGRAM: ICD-10-CM

## 2019-03-13 DIAGNOSIS — Z12.4 SCREENING FOR CERVICAL CANCER: ICD-10-CM

## 2019-03-13 DIAGNOSIS — N95.2 VAGINAL ATROPHY: ICD-10-CM

## 2019-03-13 DIAGNOSIS — R32 URINARY INCONTINENCE IN FEMALE: ICD-10-CM

## 2019-03-13 DIAGNOSIS — M81.0 OSTEOPOROSIS WITHOUT CURRENT PATHOLOGICAL FRACTURE, UNSPECIFIED OSTEOPOROSIS TYPE: ICD-10-CM

## 2019-03-13 DIAGNOSIS — N94.10 DYSPAREUNIA IN FEMALE: Primary | ICD-10-CM

## 2019-03-13 PROCEDURE — 87624 HPV HI-RISK TYP POOLED RSLT: CPT | Performed by: OBSTETRICS & GYNECOLOGY

## 2019-03-13 PROCEDURE — 77080 DXA BONE DENSITY AXIAL: CPT

## 2019-03-13 PROCEDURE — 77067 SCR MAMMO BI INCL CAD: CPT

## 2019-03-13 PROCEDURE — 77063 BREAST TOMOSYNTHESIS BI: CPT

## 2019-03-13 PROCEDURE — G0101 CA SCREEN;PELVIC/BREAST EXAM: HCPCS | Performed by: OBSTETRICS & GYNECOLOGY

## 2019-03-13 PROCEDURE — G0123 SCREEN CERV/VAG THIN LAYER: HCPCS | Performed by: OBSTETRICS & GYNECOLOGY

## 2019-03-13 PROCEDURE — 96372 THER/PROPH/DIAG INJ SC/IM: CPT | Performed by: OBSTETRICS & GYNECOLOGY

## 2019-03-13 RX ORDER — SULFAMETHOXAZOLE AND TRIMETHOPRIM 800; 160 MG/1; MG/1
TABLET ORAL
COMMUNITY
Start: 2019-01-09 | End: 2019-12-27

## 2019-03-13 RX ORDER — CLOPIDOGREL BISULFATE 75 MG/1
75 TABLET ORAL DAILY
Refills: 4 | COMMUNITY
Start: 2019-02-06 | End: 2020-06-25

## 2019-03-13 NOTE — PROGRESS NOTES
"Subjective   Chief Complaint   Patient presents with   • Gynecologic Exam     Est pt. due for yearly exam.      Tawny Shin is a 63 y.o. year old  menopausal female presenting to be seen for her annual exam.  The patient denies any vaginal bleeding in the past 12 months. Hot flashes and night sweats are not a significant problem.  Patient reports two complaints:  1) pain with intercourse - both with insertion and with deep penetration  2) urinary incontinence that only occurs when she takes her Lasix.  When traveling, she will skip this med so she does not have to worry about that    SEXUAL Hx:  She is sexually active.  In the past year there has not been new sexual partners.      HEALTH Hx:  She exercises regularly: \"some\".  Orthopedic surgeon did not want her walking on treadmill any longer because she has spacers in her knee  The patient reports regular self breast exams: no  She has noticed changes in height: no.    No Additional Complaints Reported    The following portions of the patient's history were reviewed and updated as appropriate:problem list, current medications, allergies, past family history, past medical history, past social history and past surgical history.    Social History    Tobacco Use      Smoking status: Never Smoker      Smokeless tobacco: Never Used    Review of Systems   Constitutional: Negative for activity change and unexpected weight change.   HENT: Positive for congestion.    Respiratory: Positive for cough (currently has a cold). Negative for shortness of breath.    Cardiovascular: Negative for chest pain.   Gastrointestinal: Positive for diarrhea (only occasionally, side effect of medications). Negative for abdominal pain and constipation.   Genitourinary: Positive for dyspareunia. Negative for difficulty urinating, dysuria, vaginal bleeding, vaginal discharge and vaginal pain.   Musculoskeletal: Positive for arthralgias and back pain (better since back surgery in 2018). " "  Neurological: Positive for dizziness (never had any falls).   Psychiatric/Behavioral: Negative for dysphoric mood and sleep disturbance. The patient is not nervous/anxious.          Objective   /72 (BP Location: Left arm, Patient Position: Sitting)   Ht 167.6 cm (66\")   Wt 87.1 kg (192 lb)   BMI 30.99 kg/m²   Physical Exam   Constitutional: She is oriented to person, place, and time. She appears well-developed and well-nourished. No distress.   HENT:   Head: Normocephalic and atraumatic.   Eyes: EOM are normal.   Neck: Normal range of motion. Neck supple. No thyromegaly present.   Cardiovascular: Normal rate and regular rhythm.   No murmur heard.  Pulmonary/Chest: Effort normal and breath sounds normal. Right breast exhibits no inverted nipple and no mass. Left breast exhibits no inverted nipple and no mass.   Abdominal: Soft. She exhibits no distension. There is no tenderness.   Genitourinary: Vagina normal and uterus normal. Rectal exam shows anal tone normal. No breast tenderness or discharge. Pelvic exam was performed with patient supine. There is no tenderness or lesion on the right labia. There is no tenderness or lesion on the left labia. Cervix exhibits no motion tenderness and no discharge. Right adnexum displays no tenderness and no fullness. Left adnexum displays no tenderness and no fullness. No bleeding in the vagina. No vaginal discharge found.   Genitourinary Comments: Labia normal, no lesions noted.  Urethral meatus unremarkable, no prolapse of mucosa.  Anus/perineum normal.  Vaginal mucosa pale with loss of rugae.  Cx normal, atrophied.   Musculoskeletal: Normal range of motion. She exhibits no edema.   Neurological: She is alert and oriented to person, place, and time.   Skin: Skin is warm and dry.   Psychiatric: She has a normal mood and affect. Her behavior is normal. Judgment normal.   Nursing note and vitals reviewed.           Assessment & Plan    Tawny was seen today for gynecologic " exam.    Diagnoses and all orders for this visit:    Dyspareunia in female: Discussed vaginal atrophy.  Patient does not have narrowing or stenosis on exam.  Premarin vaginal cream and MLT discussed.  Premarin use explained and script sent to pharmacy.  MLT brochure given to patient     Vaginal atrophy    Osteoporosis without current pathological fracture, unspecified osteoporosis type: DEXA ordered, patient has had a fracture in the past.  Prolia injection today  -     dexa bone density axial; Future  -     denosumab (PROLIA) syringe 60 mg; Inject 1 mL under the skin into the appropriate area as directed 1 (One) Time.    Encounter for gynecological examination without abnormal finding: Exam remarkable for genital atrophy, but otherwise normal.  Mammogram later today, SBE encouraged.  PAP collected, will likely be her last.  Routine screening labs with PCP.  -     Liquid-based Pap Smear, Screening    Breast cancer screening by mammogram  -     Mammo screening digital tomosynthesis bilateral w CAD    Urinary incontinence in female  -     Ambulatory Referral to Physical Therapy Evaluate and treat, Pelvic Floor    Screening for cervical cancer  -     Liquid-based Pap Smear, Screening    Other orders  -     conjugated estrogens (PREMARIN) 0.625 MG/GM vaginal cream; Insert  into the vagina 2 (Two) Times a Week. 1/2 gram pv twice weekly at bedtime      This note was electronically signed.    Candelaria David MD  3/13/2019  11:46 AM

## 2019-03-14 NOTE — PROGRESS NOTES
Pt notified of still having osteoporosis but has had improvement in hip and lumbar spine. Pt is still doing prolia but asked if she needed to be taking other supplementation. Let me know if she needs to start something OTC and ill let her know. BS

## 2019-03-14 NOTE — PROGRESS NOTES
Please let patient know that her bone density does say that she still has osteoporosis, but there has been improvement at both her hip and her lumbar spine.  thx

## 2019-03-15 NOTE — PROGRESS NOTES
Attempted to contact pt regarding her starting calcium with vitamin d even while on prolia. Left message for her to return my call. BS

## 2019-03-16 LAB
GEN CATEG CVX/VAG CYTO-IMP: ABNORMAL
HPV I/H RISK 4 DNA CVX QL PROBE+SIG AMP: NOT DETECTED
LAB AP CASE REPORT: ABNORMAL
LAB AP GYN ADDITIONAL INFORMATION: ABNORMAL
LAB AP GYN OTHER FINDINGS: ABNORMAL
PATH INTERP SPEC-IMP: ABNORMAL
STAT OF ADQ CVX/VAG CYTO-IMP: ABNORMAL

## 2019-03-20 ENCOUNTER — TRANSCRIBE ORDERS (OUTPATIENT)
Dept: ADMINISTRATIVE | Facility: HOSPITAL | Age: 64
End: 2019-03-20

## 2019-03-20 ENCOUNTER — LAB (OUTPATIENT)
Dept: LAB | Facility: HOSPITAL | Age: 64
End: 2019-03-20

## 2019-03-20 DIAGNOSIS — T84.53XA INFECTION OF TOTAL RIGHT KNEE REPLACEMENT, INITIAL ENCOUNTER (HCC): ICD-10-CM

## 2019-03-20 DIAGNOSIS — T84.53XA INFECTION OF TOTAL RIGHT KNEE REPLACEMENT, INITIAL ENCOUNTER (HCC): Primary | ICD-10-CM

## 2019-03-20 LAB
ALBUMIN SERPL-MCNC: 3.6 G/DL (ref 3.5–5)
ALBUMIN/GLOB SERPL: 0.9 G/DL (ref 1.1–2.5)
ALP SERPL-CCNC: 99 U/L (ref 24–120)
ALT SERPL W P-5'-P-CCNC: 21 U/L (ref 0–54)
ANION GAP SERPL CALCULATED.3IONS-SCNC: 5 MMOL/L (ref 4–13)
AST SERPL-CCNC: 30 U/L (ref 7–45)
AUTO MIXED CELLS #: 1.1 10*3/MM3 (ref 0.1–2.6)
AUTO MIXED CELLS %: 19.1 % (ref 0.1–24)
BILIRUB SERPL-MCNC: 0.3 MG/DL (ref 0.1–1)
BUN BLD-MCNC: 14 MG/DL (ref 5–21)
BUN/CREAT SERPL: 15.6
CALCIUM SPEC-SCNC: 8.4 MG/DL (ref 8.4–10.4)
CHLORIDE SERPL-SCNC: 106 MMOL/L (ref 98–110)
CO2 SERPL-SCNC: 27 MMOL/L (ref 24–31)
CREAT BLD-MCNC: 0.9 MG/DL (ref 0.5–1.4)
CRP SERPL-MCNC: <0.5 MG/DL (ref 0–0.99)
ERYTHROCYTE [DISTWIDTH] IN BLOOD BY AUTOMATED COUNT: 17.9 % (ref 12–15)
GFR SERPL CREATININE-BSD FRML MDRD: 63 ML/MIN/1.73
GLOBULIN UR ELPH-MCNC: 3.8 GM/DL
GLUCOSE BLD-MCNC: 95 MG/DL (ref 70–100)
HCT VFR BLD AUTO: 34.6 % (ref 37–47)
HGB BLD-MCNC: 10.9 G/DL (ref 12–16)
LYMPHOCYTES # BLD AUTO: 2.1 10*3/MM3 (ref 0.8–7)
LYMPHOCYTES NFR BLD AUTO: 35.9 % (ref 15–45)
MCH RBC QN AUTO: 29.1 PG (ref 28–32)
MCHC RBC AUTO-ENTMCNC: 31.5 G/DL (ref 33–36)
MCV RBC AUTO: 92.3 FL (ref 82–98)
NEUTROPHILS # BLD AUTO: 2.7 10*3/MM3 (ref 1.5–8.3)
NEUTROPHILS NFR BLD AUTO: 45 % (ref 39–78)
PLATELET # BLD AUTO: 193 10*3/MM3 (ref 130–400)
PMV BLD AUTO: 8.6 FL (ref 6–12)
POTASSIUM BLD-SCNC: 4.9 MMOL/L (ref 3.5–5.3)
PROT SERPL-MCNC: 7.4 G/DL (ref 6.3–8.7)
RBC # BLD AUTO: 3.75 10*6/MM3 (ref 4.2–5.4)
SODIUM BLD-SCNC: 138 MMOL/L (ref 135–145)
WBC NRBC COR # BLD: 5.9 10*3/MM3 (ref 4.8–10.8)

## 2019-03-20 PROCEDURE — 36415 COLL VENOUS BLD VENIPUNCTURE: CPT

## 2019-03-20 PROCEDURE — 85025 COMPLETE CBC W/AUTO DIFF WBC: CPT

## 2019-03-20 PROCEDURE — 80053 COMPREHEN METABOLIC PANEL: CPT

## 2019-03-20 PROCEDURE — 86140 C-REACTIVE PROTEIN: CPT | Performed by: INTERNAL MEDICINE

## 2019-03-21 ENCOUNTER — TRANSCRIBE ORDERS (OUTPATIENT)
Dept: ADMINISTRATIVE | Facility: HOSPITAL | Age: 64
End: 2019-03-21

## 2019-03-21 ENCOUNTER — HOSPITAL ENCOUNTER (OUTPATIENT)
Dept: ULTRASOUND IMAGING | Facility: HOSPITAL | Age: 64
Discharge: HOME | End: 2019-03-21
Admitting: INTERNAL MEDICINE

## 2019-03-21 DIAGNOSIS — R05.9 COUGH: Primary | ICD-10-CM

## 2019-03-21 PROCEDURE — 71046 X-RAY EXAM CHEST 2 VIEWS: CPT

## 2019-03-22 DIAGNOSIS — I10 ESSENTIAL HYPERTENSION: ICD-10-CM

## 2019-03-22 RX ORDER — CARVEDILOL 12.5 MG/1
12.5 TABLET ORAL DAILY
Qty: 90 TABLET | Refills: 3 | Status: SHIPPED | OUTPATIENT
Start: 2019-03-22 | End: 2019-06-28 | Stop reason: SDUPTHER

## 2019-03-26 ENCOUNTER — APPOINTMENT (OUTPATIENT)
Dept: PHYSICAL THERAPY | Facility: HOSPITAL | Age: 64
End: 2019-03-26

## 2019-04-02 ENCOUNTER — TRANSCRIBE ORDERS (OUTPATIENT)
Dept: ADMINISTRATIVE | Facility: HOSPITAL | Age: 64
End: 2019-04-02

## 2019-04-02 ENCOUNTER — LAB (OUTPATIENT)
Dept: LAB | Facility: HOSPITAL | Age: 64
End: 2019-04-02

## 2019-04-02 DIAGNOSIS — E55.9 VITAMIN D DEFICIENCY: ICD-10-CM

## 2019-04-02 DIAGNOSIS — M06.9 RHEUMATOID ARTHRITIS, INVOLVING UNSPECIFIED SITE, UNSPECIFIED RHEUMATOID FACTOR PRESENCE: ICD-10-CM

## 2019-04-02 DIAGNOSIS — E78.5 HYPERLIPIDEMIA, UNSPECIFIED HYPERLIPIDEMIA TYPE: ICD-10-CM

## 2019-04-02 DIAGNOSIS — D50.9 IRON DEFICIENCY ANEMIA, UNSPECIFIED IRON DEFICIENCY ANEMIA TYPE: ICD-10-CM

## 2019-04-02 DIAGNOSIS — Z79.899 ENCOUNTER FOR LONG-TERM (CURRENT) USE OF MEDICATIONS: Primary | ICD-10-CM

## 2019-04-02 DIAGNOSIS — Z79.899 ENCOUNTER FOR LONG-TERM (CURRENT) USE OF MEDICATIONS: ICD-10-CM

## 2019-04-02 LAB
25(OH)D3 SERPL-MCNC: 83.1 NG/ML (ref 30–100)
ALBUMIN SERPL-MCNC: 3.5 G/DL (ref 3.5–5)
ALBUMIN/GLOB SERPL: 1 G/DL (ref 1.1–2.5)
ALP SERPL-CCNC: 71 U/L (ref 24–120)
ALT SERPL W P-5'-P-CCNC: 22 U/L (ref 0–54)
ANION GAP SERPL CALCULATED.3IONS-SCNC: 4 MMOL/L (ref 4–13)
AST SERPL-CCNC: 33 U/L (ref 7–45)
AUTO MIXED CELLS #: 0.5 10*3/MM3 (ref 0.1–2.6)
AUTO MIXED CELLS %: 12.6 % (ref 0.1–24)
BILIRUB SERPL-MCNC: 0.2 MG/DL (ref 0.1–1)
BUN BLD-MCNC: 19 MG/DL (ref 5–21)
BUN/CREAT SERPL: 21.3
CALCIUM SPEC-SCNC: 7.9 MG/DL (ref 8.4–10.4)
CHLORIDE SERPL-SCNC: 106 MMOL/L (ref 98–110)
CHOLEST SERPL-MCNC: 176 MG/DL (ref 130–200)
CO2 SERPL-SCNC: 27 MMOL/L (ref 24–31)
CREAT BLD-MCNC: 0.89 MG/DL (ref 0.5–1.4)
ERYTHROCYTE [DISTWIDTH] IN BLOOD BY AUTOMATED COUNT: 17.3 % (ref 12–15)
GFR SERPL CREATININE-BSD FRML MDRD: 64 ML/MIN/1.73
GLOBULIN UR ELPH-MCNC: 3.6 GM/DL
GLUCOSE BLD-MCNC: 89 MG/DL (ref 70–100)
HCT VFR BLD AUTO: 32.9 % (ref 37–47)
HDLC SERPL-MCNC: 43 MG/DL
HGB BLD-MCNC: 10.6 G/DL (ref 12–16)
LDLC SERPL CALC-MCNC: 115 MG/DL (ref 0–99)
LDLC/HDLC SERPL: 2.67 {RATIO}
LYMPHOCYTES # BLD AUTO: 2.1 10*3/MM3 (ref 0.7–3.1)
LYMPHOCYTES NFR BLD AUTO: 51.2 % (ref 15–45)
MAGNESIUM SERPL-MCNC: 2.5 MG/DL (ref 1.4–2.2)
MCH RBC QN AUTO: 29.8 PG (ref 28–32)
MCHC RBC AUTO-ENTMCNC: 32.2 G/DL (ref 33–36)
MCV RBC AUTO: 92.4 FL (ref 82–98)
NEUTROPHILS # BLD AUTO: 1.5 10*3/MM3 (ref 1.5–8.3)
NEUTROPHILS NFR BLD AUTO: 36.2 % (ref 39–78)
PLATELET # BLD AUTO: 200 10*3/MM3 (ref 130–400)
PMV BLD AUTO: 9.2 FL (ref 6–12)
POTASSIUM BLD-SCNC: 4.6 MMOL/L (ref 3.5–5.3)
PROT SERPL-MCNC: 7.1 G/DL (ref 6.3–8.7)
RBC # BLD AUTO: 3.56 10*6/MM3 (ref 4.2–5.4)
SODIUM BLD-SCNC: 137 MMOL/L (ref 135–145)
TRIGL SERPL-MCNC: 91 MG/DL (ref 0–149)
TSH SERPL DL<=0.05 MIU/L-ACNC: 1.73 MIU/ML (ref 0.47–4.68)
URATE SERPL-MCNC: 5.3 MG/DL (ref 2.7–7.5)
VLDLC SERPL-MCNC: 18.2 MG/DL
WBC NRBC COR # BLD: 4.1 10*3/MM3 (ref 4.8–10.8)

## 2019-04-02 PROCEDURE — 80061 LIPID PANEL: CPT

## 2019-04-02 PROCEDURE — 85025 COMPLETE CBC W/AUTO DIFF WBC: CPT | Performed by: INTERNAL MEDICINE

## 2019-04-02 PROCEDURE — 82306 VITAMIN D 25 HYDROXY: CPT | Performed by: INTERNAL MEDICINE

## 2019-04-02 PROCEDURE — 84550 ASSAY OF BLOOD/URIC ACID: CPT

## 2019-04-02 PROCEDURE — 36415 COLL VENOUS BLD VENIPUNCTURE: CPT

## 2019-04-02 PROCEDURE — 80053 COMPREHEN METABOLIC PANEL: CPT | Performed by: INTERNAL MEDICINE

## 2019-04-02 PROCEDURE — 84443 ASSAY THYROID STIM HORMONE: CPT | Performed by: INTERNAL MEDICINE

## 2019-04-02 PROCEDURE — 83735 ASSAY OF MAGNESIUM: CPT | Performed by: INTERNAL MEDICINE

## 2019-04-03 ENCOUNTER — TELEPHONE (OUTPATIENT)
Dept: CARDIOLOGY | Age: 64
End: 2019-04-03

## 2019-04-03 ENCOUNTER — TRANSCRIBE ORDERS (OUTPATIENT)
Dept: ADMINISTRATIVE | Facility: HOSPITAL | Age: 64
End: 2019-04-03

## 2019-04-03 ENCOUNTER — HOSPITAL ENCOUNTER (OUTPATIENT)
Dept: GENERAL RADIOLOGY | Facility: HOSPITAL | Age: 64
Discharge: HOME | End: 2019-04-03
Admitting: INTERNAL MEDICINE

## 2019-04-03 ENCOUNTER — HOSPITAL ENCOUNTER (OUTPATIENT)
Dept: PHYSICAL THERAPY | Facility: HOSPITAL | Age: 64
Setting detail: THERAPIES SERIES
Discharge: HOME | End: 2019-04-03

## 2019-04-03 DIAGNOSIS — N94.10 DYSPAREUNIA, FEMALE: ICD-10-CM

## 2019-04-03 DIAGNOSIS — M21.962 DEFORMITY OF FOOT, LEFT: Primary | ICD-10-CM

## 2019-04-03 DIAGNOSIS — M21.962 DEFORMITY OF FOOT, LEFT: ICD-10-CM

## 2019-04-03 DIAGNOSIS — N39.46 MIXED STRESS AND URGE URINARY INCONTINENCE: Primary | ICD-10-CM

## 2019-04-03 PROCEDURE — 97110 THERAPEUTIC EXERCISES: CPT | Performed by: PHYSICAL THERAPIST

## 2019-04-03 PROCEDURE — 73630 X-RAY EXAM OF FOOT: CPT

## 2019-04-03 PROCEDURE — 97162 PT EVAL MOD COMPLEX 30 MIN: CPT | Performed by: PHYSICAL THERAPIST

## 2019-04-03 NOTE — THERAPY EVALUATION
Outpatient Physical Therapy Pelvic Health Initial Evaluation   [place]     Patient Name: Tawny Shin  : 1955  MRN: [id number]  Today's Date: 4/3/2019        Visit Date: 2019    Patient Active Problem List   Diagnosis   • Eustachian tube dysfunction   • Sensorineural hearing loss   • Lumbago of multiple sites in spine with sciatica   • Spondylolisthesis of lumbar region        Past Medical History:   Diagnosis Date   • Arthritis    • Asthma    • Chronic sinusitis    • Eustachian tube dysfunction    • Heart disease    • Herpes simplex    • Hypertension    • Laryngitis sicca    • Laryngitis, chronic    • MI (myocardial infarction) (CMS/formerly [facility])    • Otorrhea    • Sensorineural hearing loss    • Sjogren's disease (CMS/formerly [facility])         Past Surgical History:   Procedure Laterality Date   • A-V CARDIAC PACEMAKER INSERTION     • ATRIAL CARDIAC PACEMAKER INSERTION     • CORONARY ANGIOPLASTY WITH STENT PLACEMENT      X 2;  &    • LUMBAR FUSION N/A 2018    Procedure: L3-4,L4-5 DECOMPRESSION, POSTERIOR SPINAL FUSION WITH INSTRUMENTATION;  Surgeon: Fortino Oropeza MD;  Location:  [facility] OR;  Service:    • LUMBAR LAMINECTOMY WITH FUSION Left 2018    Procedure: LEFT L3-4 L4-5 LATERAL LUMBAR INTERBODY FUSION;  Surgeon: Fortino Oropeza MD;  Location:  [facility] OR;  Service:    • MYRINGOTOMY W/ TUBES  2014    TUBES NO LONGER IN PLACE   • OTHER SURGICAL HISTORY      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 so hardware was removed and spacers were placed   • REPLACEMENT TOTAL KNEE Right          Visit Dx:    ICD-10-CM ICD-9-CM   1. Mixed stress and urge urinary incontinence N39.46 788.33   2. Dyspareunia, female N94.10 625.0       Patient History     Row Name 19 1200             History    Chief Complaint  -- urinary incontinence stress and urgency; dyspareunia  -KR      Date Current Problem(s) Began  14  -KR      Brief Description of Current Complaint  Patient complains of pain with  intercourse and urinary incontinence with coughing, lifting coming from sitting to standing and walking into a bathroom.  She wears a pad 24 hours per day changing once per day.  She drinks about 6 glasses of water per day, 3-4 diet cokes.  She avoids artificial sweeteners with the exception of diet cokes.  She relates she has had at least 3 episodes of coming from sitting to standing that have soaked through her clothes.  She was diagnosed with Rheumatoid Arthritis at the age of 33.  She has a heart stint and pacemaker.  She underwent a total knee replacement in September of 2018 and it was removed in December of 2018 due to infections.  She now has spacers in the knee.  She underwent a lumbar fusion in November of 2018.  She had a recent fall blacking the right eye and injuring the right foot.  X-rays were performed of the right foot, but no results at this time.  -KR      Previous treatment for THIS PROBLEM  -- premarin cream  -KR      Patient/Caregiver Goals  Relieve pain;Know what to do to help the symptoms;Improve strength  -KR      Current Tobacco Use  None  -KR      Smoking Status  No  -KR      Patient's Rating of General Health  Fair  -KR      Hand Dominance  right-handed  -KR      Occupation/sports/leisure activities  Retired; sailing; traveling; reading  -KR      Patient seeing anyone else for problem(s)?  Yes  -KR      How has patient tried to help current problem?  Premarin Cream  -KR      Related/Recent Hospitalizations  Yes  -KR      Date of Hospitalization  12/06/18  -KR      Surgery/Hospitalization  Removed total knee due to infection  -KR      Are you or can you be pregnant  No  -KR         Pain     What Performance Factors Make the Current Problem(s) WORSE?  sexual intercourse and lasix  -KR      What Performance Factors Make the Current Problem(s) BETTER?  avoiding sexual intercourse and lasix  -KR      Is your sleep disturbed?  No  -KR      Is medication used to assist with sleep?  Yes  -KR       Total hours of sleep per night  No  -KR      What position do you sleep in?  Supine  -KR      Difficulties with ADL's?  vacuuming  -KR      Difficulties with recreational activities?  Yes  -KR      Multiple Pain Sites  Yes  -KR         Fall Risk Assessment    Any falls in the past year:  Yes  -KR      Number of falls reported in the last 12 months  3  -KR      Factors that contributed to the fall:  Tripped;Uneven surface  -KR      Does patient have a fear of falling  Yes (comment)  -KR         Services    Prior Rehab/Home Health Experiences  Yes  -KR      When was the prior experience with Rehab/Home Health  No  -KR      Where was the prior experience with Rehab/Home Health  [facility]  -KR      Are you currently receiving Home Health services  No  -KR      Do you plan to receive Home Health services in the near future  No  -KR         Daily Activities    Primary Language  English  -KR      Are you able to read  Yes  -KR      Are you able to write  Yes  -KR      How does patient learn best?  Listening;Reading;Demonstration;Pictures/Video  -KR      Teaching needs identified  Home Exercise Program;Management of Condition;Falls Prevention;Home Safety  -KR      Barriers to learning  None  -KR      Pt Participated in POC and Goals  Yes  -KR         Safety    Are you being hurt, hit, or frightened by anyone at home or in your life?  No  -KR      Are you being neglected by a caregiver  No  -KR        User Key  (r) = Recorded By, (t) = Taken By, (c) = Cosigned By    Initials Name Provider Type    Kathy Jamil, PT DPT Physical Therapist          Pelvic Health     Row Name 04/03/19 1245             Pregnancy Questions    Number of Pregnancies  2  -KR      Number of Children  2  -KR      Type of Previous Deliveries  Vaginal  -KR      Do you have radicular pain or numbness?  No  -KR         Pelvic Floor Muscle    Strength (Right)  1: Trace  -KR      Strength (Left)  1: Trace  -KR      Symmetry of Sustained Maximal Contraction   Right stronger than left  -KR      Endurance (Ability to Hold Maximal Contraction)  2 sec  -KR      # of Reps of Maximal Contractions while Maintaining Endurance and Strength  1 set  -KR      Fast Contraction (# of 1 sec contractions performed)  -- not assessible  -KR      Interior Muscle Comments  Verbal consent was provided by patient to perform internal pelvic floor assessment.  No palpable tenderness pelvic floor musculature.  Mild tautness 3 - 6 O'clock on pelvic clock.  -KR         Perineal Observation    Perineal Observation Performed?  Yes  -KR        User Key  (r) = Recorded By, (t) = Taken By, (c) = Cosigned By    Initials Name Provider Type    Kathy Jamil, PT DPT Physical Therapist        PT Ortho     Row Name 04/03/19 1300       Precautions and Contraindications    Precautions/Limitations  pacemaker  -KR       Posture/Observations    Posture/Observations Comments  Forward head, protracted scapula R > L, left IC > Right, anterior pelvic tilt and deconditioned abdominal wall.   -KR       DTR- Lower Quarter Clearing    Patellar tendon (L2-4)  Bilateral:;0- No response  -KR    Achilles tendon (S1-2)  Bilateral:;0- No response  -KR       Myotomal Screen- Lower Quarter Clearing    Hip flexion (L2)  Right:;3+ (Fair +);Left:;4+ (Good +)  -KR    Knee extension (L3)  Right:;4- (Good -);Left:;4+ (Good +)  -KR    Ankle DF (L4)  Bilateral:;4+ (Good +)  -KR    Great toe extension (L5)  Bilateral:;4+ (Good +)  -KR    Ankle PF (S1)  Bilateral:;4+ (Good +)  -KR    Knee flexion (S2)  Bilateral:;4+ (Good +)  -KR       General ROM    GENERAL ROM COMMENTS  Hip IR/ER: R ER 30% impaired; IR 45% impaired: L WNL  -KR       Sensation    Sensation WNL?  WNL  -KR    Light Touch  No apparent deficits  -KR       Balance Skills Training    SLS  R 0 sec; L 2 sec  -KR    Balance Comments  Ambulates independently without AD demonstrating and antalgic gait pattern with decreased weight bearing on left and excessive lumbopelvic  rotation on left.   -KR      User Key  (r) = Recorded By, (t) = Taken By, (c) = Cosigned By    Initials Name Provider Type    Kathy Jamil, PT DPT Physical Therapist                     PT Assessment/Plan     Row Name 04/03/19 1400          PT Assessment    Functional Limitations  Impaired gait;Limitation in home management;Limitations in community activities;Performance in leisure activities  -KR     Impairments  Balance;Gait;Impaired muscle endurance;Impaired muscle power;Impaired postural alignment;Muscle strength;Pain  -KR     Assessment Comments  Mrs. Shin presents with significantly weak pelvic floor muscles demonstrating a trace strength bilateral levator ani musculature, decreased endurance and timing of those muscles as well.  She also admits that she goes to the bathroom at least once an hour so she will not leak, adding to the urinary urgency.  Bladder training was discussed, as well as discontinuing diet cokes.  A home exercise program was initiated and patient appears motivated to perform.   -KR     Please refer to paper survey for additional self-reported information  Yes  -KR     Rehab Potential  Good  -KR     Patient/caregiver participated in establishment of treatment plan and goals  Yes  -KR     Patient would benefit from skilled therapy intervention  Yes  -KR        PT Plan    PT Frequency  1x/week  -KR     Predicted Duration of Therapy Intervention (Therapy Eval)  12 weeks  -KR     Planned CPT's?  PT EVAL MOD COMPLELITY: 05279;PT THER PROC EA 15 MIN: 99877;PT THER ACT EA 15 MIN: 50085;PT MANUAL THERAPY EA 15 MIN: 05838;PT NEUROMUSC RE-EDUCATION EA 15 MIN: 74176;PT GAIT TRAINING EA 15 MIN: 32101;PT BIOFEEDBACK YUDY/ANO/URETHRAL: 33554  -KR     PT Plan Comments  Will begin with home exercise program to discern the difference between a contraction and relaxation of the pelvic floor musculature along with simultaneous contraction of the transverse abdominous muscle as well as bladder training  and discontinuing diet cokes.  Will progress exercise program to include strengthening, timing and endurance training of the pelvic floor musculature.   -KR       User Key  (r) = Recorded By, (t) = Taken By, (c) = Cosigned By    Initials Name Provider Type    KR Kathy Kwok, PT DPT Physical Therapist            Exercises     Row Name 04/03/19 1245             Total Minutes    91100 - PT Therapeutic Exercise Minutes  15  -KR         Exercise 1    Exercise Name 1  Transverse Abdominous activation  -KR      Cueing 1  Verbal;Tactile  -KR      Sets 1  2  -KR      Reps 1  10  -KR      Time 1  6 sec  holds  -KR         Exercise 2    Exercise Name 2   PFM contract/relax w/TA activation  -KR      Cueing 2  Verbal;Tactile  -KR      Sets 2  3  -KR      Reps 2  8  -KR      Time 2  3 sec hold;4 sec rest  -KR        User Key  (r) = Recorded By, (t) = Taken By, (c) = Cosigned By    Initials Name Provider Type    KR Kathy Kwok, PT DPT Physical Therapist                      PT OP Goals     Row Name 04/03/19 1245          PT Short Term Goals    STG Date to Achieve  04/24/19  -KR     STG 1  Patient will begin a home exercise program to accelerate the recovery  process  -KR     STG 1 Progress  New  -KR     STG 2  Patient will be able to contract the PFM at a 2/5 grade and hold 6 sec.'s  -KR     STG 2 Progress  New  -KR        Long Term Goals    LTG Date to Achieve  05/27/20  -KR     LTG 1  Patient will have an increase in levator ani strength to a 4/5 - 4+/5 grade  -KR     LTG 1 Progress  New  -KR     LTG 2  Patient will have 0 - 1 wet episodes in a 2 week period  -KR     LTG 2 Progress  New  -KR     LTG 3  Patient will be able to perform 12 PFM  contract/relax in a 10 second period  -KR     LTG 3 Progress  New  -KR     LTG 4  Patient will have an 80 - 90% reduction in pain during sexual intercourse  -KR     LTG 4 Progress  New  -KR        Time Calculation    PT Goal Re-Cert Due Date  07/02/19  -KR       User Key  (r) = Recorded  By, (t) = Taken By, (c) = Cosigned By    Initials Name Provider Type    Kathy Jamil, PT DPT Physical Therapist          Therapy Education  Education Details: Instruction in TA activation with PFM contractions and contract/relax of PFM. Instructed to avoid going to the bathroom unless she has an urge and to discontinue use of diet cokes.  Given: HEP, Symptoms/condition management, Pain management  Program: New  How Provided: Verbal, Demonstration, Written  Provided to: Patient  Level of Understanding: Verbalized, Demonstrated               Time Calculation:   Start Time: 1245  Stop Time: 1345  Time Calculation (min): 60 min  Total Timed Code Minutes- PT: 15 minute(s)  Therapy Charges for Today     Code Description Service Date Service Provider Modifiers Qty    73009248185 HC PT THER PROC EA 15 MIN 4/3/2019 Kathy Kwok, PT DPT GP 1    [id number]  PT EVAL MOD COMPLEXITY 3 4/3/2019 Kathy Kwok, PT DPT GP 1                  Kathy Kwok PT DPT  4/3/2019

## 2019-04-05 ENCOUNTER — TELEPHONE (OUTPATIENT)
Dept: CARDIOLOGY | Age: 64
End: 2019-04-05

## 2019-04-05 DIAGNOSIS — Z95.0 PACEMAKER: Primary | ICD-10-CM

## 2019-04-05 DIAGNOSIS — I49.5 SINUS NODE DYSFUNCTION (HCC): ICD-10-CM

## 2019-04-05 PROCEDURE — 93294 REM INTERROG EVL PM/LDLS PM: CPT | Performed by: CLINICAL NURSE SPECIALIST

## 2019-04-05 PROCEDURE — 93296 REM INTERROG EVL PM/IDS: CPT | Performed by: CLINICAL NURSE SPECIALIST

## 2019-04-05 NOTE — TELEPHONE ENCOUNTER
Called and spoke with Pt, reminded Pt to send manual carelink report from home monitor. Pt voiced understanding and stated they would send a reading this morning. Also made appt with Elio on 4/15/2019.

## 2019-04-08 ENCOUNTER — TELEPHONE (OUTPATIENT)
Dept: CARDIOLOGY | Age: 64
End: 2019-04-08

## 2019-04-08 NOTE — TELEPHONE ENCOUNTER
Patient returned call to discuss carelink results she sent in on 4/5/19. She stated she had a syncopal episode on 3/28/19 around lunch time. carelink report shows episode of tachycardia rates 154 around 9 am with activity. Pacemaker device status results within normal range. Patient stated she was sitting and she got up and was dizzy and fainted. She did report she was getting over a stomach bug. She will call back if she has any further problems.

## 2019-04-15 ENCOUNTER — TELEPHONE (OUTPATIENT)
Dept: CARDIOLOGY | Age: 64
End: 2019-04-15

## 2019-04-15 ENCOUNTER — OFFICE VISIT (OUTPATIENT)
Dept: CARDIOLOGY | Age: 64
End: 2019-04-15
Payer: MEDICARE

## 2019-04-15 ENCOUNTER — HOSPITAL ENCOUNTER (OUTPATIENT)
Dept: PHYSICAL THERAPY | Facility: HOSPITAL | Age: 64
Setting detail: THERAPIES SERIES
Discharge: HOME | End: 2019-04-15

## 2019-04-15 VITALS
BODY MASS INDEX: 31.34 KG/M2 | SYSTOLIC BLOOD PRESSURE: 100 MMHG | DIASTOLIC BLOOD PRESSURE: 58 MMHG | HEART RATE: 76 BPM | WEIGHT: 195 LBS | HEIGHT: 66 IN

## 2019-04-15 DIAGNOSIS — Z95.0 PACEMAKER: ICD-10-CM

## 2019-04-15 DIAGNOSIS — R55 VASOVAGAL SYNCOPE: ICD-10-CM

## 2019-04-15 DIAGNOSIS — N39.46 MIXED STRESS AND URGE URINARY INCONTINENCE: Primary | ICD-10-CM

## 2019-04-15 DIAGNOSIS — N94.10 DYSPAREUNIA, FEMALE: ICD-10-CM

## 2019-04-15 DIAGNOSIS — Z95.5 H/O HEART ARTERY STENT: ICD-10-CM

## 2019-04-15 DIAGNOSIS — Z78.9 STATIN INTOLERANCE: ICD-10-CM

## 2019-04-15 DIAGNOSIS — I25.10 CORONARY ARTERY DISEASE INVOLVING NATIVE CORONARY ARTERY OF NATIVE HEART WITHOUT ANGINA PECTORIS: Primary | ICD-10-CM

## 2019-04-15 DIAGNOSIS — I49.5 SINUS NODE DYSFUNCTION (HCC): ICD-10-CM

## 2019-04-15 DIAGNOSIS — E78.2 MIXED HYPERLIPIDEMIA: ICD-10-CM

## 2019-04-15 PROCEDURE — G8598 ASA/ANTIPLAT THER USED: HCPCS | Performed by: INTERNAL MEDICINE

## 2019-04-15 PROCEDURE — G8427 DOCREV CUR MEDS BY ELIG CLIN: HCPCS | Performed by: INTERNAL MEDICINE

## 2019-04-15 PROCEDURE — 97110 THERAPEUTIC EXERCISES: CPT | Performed by: PHYSICAL THERAPIST

## 2019-04-15 PROCEDURE — G8417 CALC BMI ABV UP PARAM F/U: HCPCS | Performed by: INTERNAL MEDICINE

## 2019-04-15 PROCEDURE — 1123F ACP DISCUSS/DSCN MKR DOCD: CPT | Performed by: INTERNAL MEDICINE

## 2019-04-15 PROCEDURE — 99214 OFFICE O/P EST MOD 30 MIN: CPT | Performed by: INTERNAL MEDICINE

## 2019-04-15 PROCEDURE — 4040F PNEUMOC VAC/ADMIN/RCVD: CPT | Performed by: INTERNAL MEDICINE

## 2019-04-15 PROCEDURE — 1090F PRES/ABSN URINE INCON ASSESS: CPT | Performed by: INTERNAL MEDICINE

## 2019-04-15 PROCEDURE — 3017F COLORECTAL CA SCREEN DOC REV: CPT | Performed by: INTERNAL MEDICINE

## 2019-04-15 PROCEDURE — G8400 PT W/DXA NO RESULTS DOC: HCPCS | Performed by: INTERNAL MEDICINE

## 2019-04-15 PROCEDURE — 93280 PM DEVICE PROGR EVAL DUAL: CPT | Performed by: INTERNAL MEDICINE

## 2019-04-15 PROCEDURE — 1036F TOBACCO NON-USER: CPT | Performed by: INTERNAL MEDICINE

## 2019-04-15 ASSESSMENT — ENCOUNTER SYMPTOMS
SHORTNESS OF BREATH: 0
EYES NEGATIVE: 1
GASTROINTESTINAL NEGATIVE: 1
NAUSEA: 0
VOMITING: 0
DIARRHEA: 0
RESPIRATORY NEGATIVE: 1

## 2019-04-15 NOTE — TELEPHONE ENCOUNTER
Patient in the office today to see Dr. Tiffanie Sanabria he wants patient to start [address] [place] [address]. Can you please start the process for this?

## 2019-04-15 NOTE — PROGRESS NOTES
[facility] Progress [facility]                            Date:  4/15/2019  Patient: Ev Franco  Age:  70 y.o., 1955      Reason for evaluation:         SUBJECTIVE:    Seen today follow-up assessment. Recently had several episodes where she would become hot dizzy faint-like several weeks ago and then and had another episode last week where she actually fainted. Interrogation of her device today indicated no significant arrhythmias. Denies exertional dyspnea or chest pain. Does not smoke. Recently he has had a stress fracture of her left foot which limits her activities the past few weeks. Recent LDL cholesterol was over 120 cannot tolerate statin agents. No other complaints. She is on Zetia. Has had several stents placed previously. Review of Systems   Constitutional: Negative. Negative for chills, fever and unexpected weight change. HENT: Negative. Eyes: Negative. Respiratory: Negative. Negative for shortness of breath. Cardiovascular: Negative. Negative for chest pain. Gastrointestinal: Negative. Negative for diarrhea, nausea and vomiting. Endocrine: Negative. Genitourinary: Negative. Musculoskeletal: Negative. Skin: Negative. Neurological: Negative. OBJECTIVE:     BP (!) 100/58   Pulse 76   Ht 5' 6\" (1.676 m)   Wt 195 lb (88.5 kg)   BMI 31.47 kg/m²     Labs:   CBC: No results for input(s): WBC, HGB, HCT, PLT in the last 72 hours. BMP:No results for input(s): NA, K, CO2, BUN, CREATININE, LABGLOM, GLUCOSE in the last 72 hours. BNP: No results for input(s): BNP in the last 72 hours. PT/INR: No results for input(s): PROTIME, INR in the last 72 hours. APTT:No results for input(s): APTT in the last 72 hours. CARDIAC ENZYMES:No results for input(s): CKTOTAL, CKMB, CKMBINDEX, TROPONINI in the last 72 hours.   FASTING LIPID PANEL:  Lab Results   Component Value Date    HDL 24 05/14/2014    LDLDIRECT 111 05/14/2014    TRIG 165 05/14/2014     LIVER PROFILE:No results for input(s): AST, ALT, LABALBU in the last 72 hours. Past Medical History:   Diagnosis Date    CAD (coronary artery disease)     S/p Stent to right coronary artery 9/2010. S/p myocardial infarction May 2013.  Cellulitis     LT LEG IN PAST    History of blood transfusion     History of DVT (deep vein thrombosis)     LT    History of neutropenia     History of pulmonary fibrosis     Hx of blood clots 1992    LT LEG    Hypercholesterolemia     Hypertension     Intrinsic asthma     Pacemaker 11/17/2016    Post op infection     \"right knee leaking after my replacement\"    Rheumatoid arteritis     Sinus node dysfunction ([address]     Staph aureus infection     Status post placement of implantable loop recorder 2/13/15, 3/30/15    2/19/15  removed, infection    Syncope 2/11/2015    Thyroid disease     HYPOTHYROIDISM     Past Surgical History:   Procedure Laterality Date    BACK SURGERY  01/01/2018    CARDIAC CATHETERIZATION  5/14/14  [facility]DL    normal LV function. EF 60%    CARDIAC CATHETERIZATION  1/26/15  [facility]DL    EF 60%, Left circ has long 80-90% in small 2mm vessel, diffuse disease in RCA    CORONARY ANGIOPLASTY WITH STENT PLACEMENT  2014    DIAGNOSTIC CARDIAC CATH LAB PROCEDURE      Right coronary artery stent.  9/2010    JOINT REPLACEMENT      LEEP      PACEMAKER INSERTION  11/17/2016    Dr Criselda Ortiz; [employer]    PACEMAKER PLACEMENT      medtronic    VA INCIS/DRAIN THIGH/KNEE ABSCESS,DEEP Right 10/23/2018    KNEE INCISION AND DRAINAGE performed by Yonis Kaiser MD at [facility] Right 9/11/2018    KNEE TOTAL ARTHROPLASTY performed by Yonis Kaiser MD at [place] [address] Right 12/6/2018    KNEE EXPLANT AND PLACEMENT OF ANTIBIOTIC SPACER performed by Yonis Kaiser MD at [facility] OR     Family History   Problem Relation Age of Onset    Cancer Mother         LUNG CA    Heart Attack Father 39 FATAL MI     Allergies   Allergen Reactions    Amoxicillin Rash    Lipitor Rash    Niaspan [Niacin Er] Rash    Penicillins Rash    Relafen [Nabumetone] Rash    Zocor [Simvastatin] Rash     Muscle cramps     Current Outpatient Medications   Medication Sig Dispense Refill    Sulfamethoxazole-Trimethoprim (BACTRIM PO) Take by mouth      carvedilol (COREG) 12.5 MG tablet Take 1 tablet by mouth daily 90 tablet 3    clopidogrel (PLAVIX) 75 MG tablet Take 1 tablet by mouth daily 30 tablet 5    rivaroxaban (XARELTO) 10 MG TABS tablet Take 1 tablet by mouth daily (with breakfast) 28 tablet 0    salsalate (DISALCID) 500 MG tablet Take 2,500 mg by mouth See Admin Instructions Monday, Wednesday, Friday and Sunday      salsalate (DISALCID) 500 MG tablet Take 2,000 mg by mouth See Admin Instructions Saturday, Tuesday and Thursday      Multiple Vitamins-Minerals (THERAPEUTIC MULTIVITAMIN-MINERALS) tablet Take 1 tablet by mouth daily      denosumab (PROLIA) 60 MG/ML SOLN SC injection Inject 60 mg into the skin once      docusate sodium (COLACE) 100 MG capsule Take 1 capsule by mouth daily To prevent constipation 20 capsule 0    isosorbide mononitrate (IMDUR) 30 MG extended release tablet Take 30 mg by mouth daily      predniSONE (DELTASONE) 1 MG tablet Take 2 mg by mouth daily      traMADol-acetaminophen (ULTRACET) 37.5-325 MG per tablet Take 1 tablet by mouth 2 times daily. Keila Angel nitroGLYCERIN (NITROSTAT) 0.4 MG SL tablet Place 1 tablet under the tongue every 5 minutes as needed for Chest pain 25 tablet 3    valACYclovir (VALTREX) 500 MG tablet Take 1 tablet by mouth daily 30 tablet 0    pantoprazole (PROTONIX) 40 MG tablet Take 1 tablet by mouth daily 30 tablet 3    fluticasone (FLONASE) 50 MCG/ACT nasal spray 2 sprays by Nasal route daily      spironolactone (ALDACTONE) 25 MG tablet Take 25 mg by mouth daily      ergocalciferol (ERGOCALCIFEROL) 01667 units capsule Take 50,000 Units by mouth once a week      cloNIDine (CATAPRES) 0.1 MG tablet Take 0.2 mg by mouth nightly       levothyroxine (SYNTHROID) 75 MCG tablet Take 75 mcg by mouth Daily       folic acid (FOLVITE) 1 MG tablet Take 1 mg by mouth daily.  cetirizine (ZYRTEC) 10 MG tablet Take 10 mg by mouth daily       ezetimibe (ZETIA) 10 MG tablet Take 10 mg by mouth daily       furosemide (LASIX) 40 MG tablet Take 40 mg by mouth daily.  pregabalin (LYRICA) 50 MG capsule Take 1 capsule by mouth daily for 28 days. 60 capsule 0     No current facility-administered medications for this visit.       Social History     Socioeconomic History    Marital status:      Spouse name: Not on file    Number of children: Not on file    Years of education: Not on file    Highest education level: Not on file   Occupational History    Not on file   Social Needs    Financial resource strain: Not on file    Food insecurity:     Worry: Not on file     Inability: Not on file    Transportation needs:     Medical: Not on file     Non-medical: Not on file   Tobacco Use    Smoking status: Never Smoker    Smokeless tobacco: Never Used   Substance and Sexual Activity    Alcohol use: No    Drug use: No    Sexual activity: Not on file   Lifestyle    Physical activity:     Days per week: Not on file     Minutes per session: Not on file    Stress: Not on file   Relationships    Social connections:     Talks on phone: Not on file     Gets together: Not on file     Attends Mosque service: Not on file     Active member of club or organization: Not on file     Attends meetings of clubs or organizations: Not on file     Relationship status: Not on file    Intimate partner violence:     Fear of current or ex partner: Not on file     Emotionally abused: Not on file     Physically abused: Not on file     Forced sexual activity: Not on file   Other Topics Concern    Not on file   Social History Narrative    Not on file       Physical Examination:  BP (!) 100/58   Pulse 76   Ht 5' 6\" (1.676 m)   Wt 195 lb (88.5 kg)   BMI 31.47 kg/m²   Physical Exam   Constitutional: She appears well-developed and well-nourished. Neck: No JVD present. Carotid bruit is not present. Cardiovascular: Normal rate, regular rhythm and normal heart sounds. Exam reveals no gallop and no friction rub. No murmur heard. Pulmonary/Chest: Effort normal and breath sounds normal. No respiratory distress. She has no wheezes. She has no rales. Abdominal: She exhibits no distension. There is no tenderness. Musculoskeletal: She exhibits no edema. Lymphadenopathy:     She has no cervical adenopathy. Skin: Skin is warm and dry. ASSESSMENT:     Diagnosis Orders   1. Coronary artery disease involving native coronary artery of native heart without angina pectoris     2. Pacemaker     3. Sinus node dysfunction (HCC)     4. H/O heart artery stent     5. Mixed hyperlipidemia     6. Statin intolerance     7. Vasovagal syncope         PLAN:  No orders of the defined types were placed in this encounter. No orders of the defined types were placed in this encounter. 1. Continue present medications  2. Recommend follow-up assessment in 6 months  3. Discussed her syncopal episodes which do not appear to be based on cardiac arrhythmias I suspect these are vasovagal episodes in origin emphasize recognition awareness and avoidance of injury. Return in about 6 months (around 10/15/2019). Anup Guaman MD 4/15/2019 3:29 PM    [facility]      Thisdictation was generated by voice recognition computer software. Although all attempts are made to edit the dictation for accuracy, there may be errors in the transcription that are not intended.

## 2019-04-15 NOTE — PROGRESS NOTES
Pacemaker interrogated  Presenting rhythm:  APVS, AP 36.3%,  <0.1%  Battey voltage 3.91 V, 7.5 years  Lead status:  Lead impedance within range and stable  Sensing:  P waves 2.6 mV,  R waves 12.1 mV  Thresholds:  Atrial 0.5V @ 0.4ms, ventricular 0.75@ 0.4ms  Observations:  No arrhythmias since last Carelink. Reprogramming for sensitivity and threshold testing  Next carelink appointment:  7/15/19.

## 2019-04-15 NOTE — THERAPY TREATMENT NOTE
Outpatient Physical Therapy Pelvic Health Treatment Note  [facility]     Patient Name: Tawny Shin  : 1955  MRN: [id number]  Today's Date: 4/15/2019        Visit Date: 04/15/2019    Visit Dx:    ICD-10-CM ICD-9-CM   1. Mixed stress and urge urinary incontinence N39.46 788.33   2. Dyspareunia, female N94.10 625.0       Patient Active Problem List   Diagnosis   • Eustachian tube dysfunction   • Sensorineural hearing loss   • Lumbago of multiple sites in spine with sciatica   • Spondylolisthesis of lumbar region        Pelvic Health     Row Name 04/15/19 0800             Pelvic Floor Muscle    Strength (Right)  2: Squeeze no lift  -KR      Strength (Left)  2: Squeeze no lift  -KR      Symmetry of Sustained Maximal Contraction  Symmetrical  -KR      Endurance (Ability to Hold Maximal Contraction)  6 sec  -KR      # of Reps of Maximal Contractions while Maintaining Endurance and Strength  1 set  -KR      Fast Contraction (# of 1 sec contractions performed)  4  -KR      Interior Muscle Comments  Verbal consent provided by patient to perform internal pelvic exam  -KR        User Key  (r) = Recorded By, (t) = Taken By, (c) = Cosigned By    Initials Name Provider Type    Kathy Jamil, PT DPT Physical Therapist                       PT Assessment/Plan     Row Name 04/15/19 08          PT Assessment    Assessment Comments  Mrs. Shin demonstrates an increase in levator ani strength to a 2/5 grade, endurance to 6 seconds and timing to 4 quick contract/relax in a 10 second period.  She continues to use some overflow of adductors and gluteals, however less than at initial visit.    -KR        PT Plan    PT Plan Comments  Will continue to progress exercise program as strength, endurance and timing improve and continue bladder training to reach goals of treatment plan.   -KR       User Key  (r) = Recorded By, (t) = Taken By, (c) = Cosigned By    Initials Name Provider Type    Kathy Jamil, PT DPT Physical  Therapist            Exercises     Row Name 04/15/19 0800             Subjective Comments    Subjective Comments  Patient reports she did not take her lasix all week because she was traveling and did not have any leaks all week.  She has reduced her diet drinks to once per day. She complains of bilateral LE swelling and pain due to not taking lasix.  She had an x-ray before going out of town revealing a stress fracture of metatarsal heads.    -KR         Subjective Pain    Able to rate subjective pain?  yes  -KR      Pre-Treatment Pain Level  0  -KR      Post-Treatment Pain Level  0  -KR         Total Minutes    57888 - PT Therapeutic Exercise Minutes  40  -KR         Exercise 1    Exercise Name 1  Transverse Abdominous Activation  -KR      Cueing 1  Verbal;Tactile  -KR      Sets 1  1  -KR      Reps 1  10  -KR      Time 1  6 sec holds  -KR         Exercise 2    Exercise Name 2  PFM contractions/relaxations  -KR      Cueing 2  Verbal;Tactile  -KR      Sets 2  3  -KR      Reps 2  8  -KR      Time 2  6 sec hold  -KR      Additional Comments  facilitation performed intermittently  -KR         Exercise 3    Exercise Name 3  PFM quick contract/relax  -KR      Cueing 3  Verbal;Tactile  -KR      Sets 3  3  -KR      Reps 3  4  -KR      Time 3  10 sec increments  -KR         Exercise 4    Exercise Name 4  Bridging  -KR      Cueing 4  Verbal  -KR      Sets 4  1  -KR      Reps 4  10  -KR      Additional Comments  TA and PFM activation  -KR         Exercise 5    Exercise Name 5  Proper breathing w/contract/relax  -KR      Time 5  10 minutes  -KR        User Key  (r) = Recorded By, (t) = Taken By, (c) = Cosigned By    Initials Name Provider Type    Kathy Jamil, PT DPT Physical Therapist                      PT OP Goals     Row Name 04/15/19 0800          PT Short Term Goals    STG Date to Achieve  04/24/19  -KR     STG 1  Patient will begin a home exercise program to accelerate the recovery  process  -KR     STG 1 Progress   Progressing  -KR     STG 1 Progress Comments  Increased HEP today  -KR     STG 2  Patient will be able to contract the PFM at a 2/5 grade and hold 6 sec.'s  -KR     STG 2 Progress  Met  -KR        Long Term Goals    LTG Date to Achieve  05/27/20  -KR     LTG 1  Patient will have an increase in levator ani strength to a 4/5 - 4+/5 grade  -KR     LTG 1 Progress  New  -KR     LTG 2  Patient will have 0 - 1 wet episodes in a 2 week period  -KR     LTG 2 Progress  New  -KR     LTG 3  Patient will be able to perform 12 PFM  contract/relax in a 10 second period  -KR     LTG 3 Progress  New  -KR     LTG 4  Patient will have an 80 - 90% reduction in pain during sexual intercourse  -KR     LTG 4 Progress  New  -KR        Time Calculation    PT Goal Re-Cert Due Date  07/02/19  -       User Key  (r) = Recorded By, (t) = Taken By, (c) = Cosigned By    Initials Name Provider Type    Kathy Jamil, PT DPT Physical Therapist           Therapy Education  Education Details: Added quick contract/relax and briding to HEP.  Also increased hold time to 6 seconds.   Given: HEP, Symptoms/condition management  Program: Progressed  How Provided: Verbal, Demonstration, Written  Provided to: Patient  Level of Understanding: Verbalized, Demonstrated              Time Calculation:   Start Time: 0800  Stop Time: 0840  Time Calculation (min): 40 min  Total Timed Code Minutes- PT: 40 minute(s)  Therapy Charges for Today     Code Description Service Date Service Provider Modifiers Qty    97995911295 HC PT THER PROC EA 15 MIN 4/15/2019 Kathy Kwok, PT DPT GP 3                    Kathy Kwok PT DPT  4/15/2019

## 2019-04-19 RX ORDER — ISOSORBIDE MONONITRATE 30 MG/1
30 TABLET, EXTENDED RELEASE ORAL DAILY
Qty: 30 TABLET | Refills: 5 | Status: SHIPPED | OUTPATIENT
Start: 2019-04-19 | End: 2019-06-25

## 2019-04-22 ENCOUNTER — APPOINTMENT (OUTPATIENT)
Dept: PHYSICAL THERAPY | Facility: HOSPITAL | Age: 64
End: 2019-04-22

## 2019-04-22 RX ORDER — CLOPIDOGREL BISULFATE 75 MG/1
75 TABLET ORAL DAILY
Qty: 30 TABLET | Refills: 0 | Status: SHIPPED | OUTPATIENT
Start: 2019-04-22 | End: 2019-04-22 | Stop reason: SDUPTHER

## 2019-04-22 RX ORDER — CLOPIDOGREL BISULFATE 75 MG/1
75 TABLET ORAL DAILY
Qty: 90 TABLET | Refills: 3 | Status: SHIPPED | OUTPATIENT
Start: 2019-04-22 | End: 2019-07-23 | Stop reason: ALTCHOICE

## 2019-05-01 ENCOUNTER — APPOINTMENT (OUTPATIENT)
Dept: PHYSICAL THERAPY | Facility: HOSPITAL | Age: 64
End: 2019-05-01

## 2019-05-06 RX ORDER — VALACYCLOVIR HYDROCHLORIDE 500 MG/1
500 TABLET, FILM COATED ORAL DAILY
Qty: 30 TABLET | Refills: 3 | Status: SHIPPED | OUTPATIENT
Start: 2019-05-06 | End: 2019-09-26 | Stop reason: SDUPTHER

## 2019-05-06 NOTE — TELEPHONE ENCOUNTER
Pt requesting new refill of valtrex sent to [facility]. Pt changed insurance and refills are no longer valid. rx sent.

## 2019-05-08 ENCOUNTER — HOSPITAL ENCOUNTER (OUTPATIENT)
Dept: PHYSICAL THERAPY | Facility: HOSPITAL | Age: 64
Setting detail: THERAPIES SERIES
Discharge: HOME | End: 2019-05-08

## 2019-05-08 DIAGNOSIS — N94.10 DYSPAREUNIA, FEMALE: ICD-10-CM

## 2019-05-08 DIAGNOSIS — N39.46 MIXED STRESS AND URGE URINARY INCONTINENCE: Primary | ICD-10-CM

## 2019-05-08 PROCEDURE — 97110 THERAPEUTIC EXERCISES: CPT | Performed by: PHYSICAL THERAPIST

## 2019-05-08 NOTE — THERAPY TREATMENT NOTE
Outpatient Physical Therapy Pelvic Health Treatment Note  [facility]     Patient Name: Tawny Shin  : 1955  MRN: [id number]  Today's Date: 2019        Visit Date: 2019    Visit Dx:    ICD-10-CM ICD-9-CM   1. Mixed stress and urge urinary incontinence N39.46 788.33   2. Dyspareunia, female N94.10 625.0       Patient Active Problem List   Diagnosis   • Eustachian tube dysfunction   • Sensorineural hearing loss   • Lumbago of multiple sites in spine with sciatica   • Spondylolisthesis of lumbar region        Pelvic Health     Row Name 19 0752             Pelvic Floor Muscle    Strength (Right)  3: Squeeze with/without lift  -KR      Strength (Left)  3: Squeeze with/without lift  -KR      Symmetry of Sustained Maximal Contraction  Right stronger than left  -KR      Endurance (Ability to Hold Maximal Contraction)  10 sec  -KR      # of Reps of Maximal Contractions while Maintaining Endurance and Strength  2 sets  -KR      Fast Contraction (# of 1 sec contractions performed)  9  -KR      Interior Muscle Comments  Verbal consent provided by patient to perform internal pelvic floor assessment.  -KR        User Key  (r) = Recorded By, (t) = Taken By, (c) = Cosigned By    Initials Name Provider Type    Kathy Jamil, PT DPT Physical Therapist                       PT Assessment/Plan     Row Name 19 0800          PT Assessment    Assessment Comments  Mrs. Shin demonstrates improvement subjectively and objectively with only 1 wet episode since 4/15/19, 2-% improvement in pain during sexual intercourse and decreased frequency of urination.  Strength of levator ani has increased bilaterally, timing and endurance have improved.   -KR        PT Plan    PT Plan Comments  Will continue with progression of HEP based on findings of interal pelvic floor assessments.  -KR       User Key  (r) = Recorded By, (t) = Taken By, (c) = Cosigned By    Initials Name Provider Type    Kathy Jamil, PT DPT  Physical Therapist            Exercises     Row Name 05/08/19 0752             Subjective Comments    Subjective Comments  Patient reports she has had only one leakage since last session. She relates frequency has decreased and sexual intecourse is 20% less painful.  -KR         Subjective Pain    Able to rate subjective pain?  yes  -KR      Pre-Treatment Pain Level  0  -KR      Post-Treatment Pain Level  0  -KR         Total Minutes    58824 - PT Therapeutic Exercise Minutes  40  -KR         Exercise 1    Exercise Name 1  Transverse Abdominous Activation  -KR      Cueing 1  Verbal;Tactile  -KR      Sets 1  1  -KR      Reps 1  10  -KR      Time 1  6 sec holds  -KR         Exercise 2    Exercise Name 2  PFM contractions/relaxations  -KR      Cueing 2  Verbal;Tactile  -KR      Sets 2  3  -KR      Reps 2  8  -KR      Time 2  10 sec hold  -KR         Exercise 3    Exercise Name 3  PFM quick contract/relax  -KR      Cueing 3  Verbal;Tactile  -KR      Sets 3  3  -KR      Reps 3  9  -KR      Time 3  10 sec increments  -KR         Exercise 4    Exercise Name 4  Bridging w/ resisted hip abduction  -KR      Cueing 4  Verbal  -KR      Sets 4  1  -KR      Reps 4  10  -KR      Additional Comments  TA and PFM activation  -KR         Exercise 5    Exercise Name 5  Isometric hip adduction w/ball  -KR      Cueing 5  Verbal  -KR      Sets 5  1  -KR      Reps 5  10  -KR      Time 5  6 sec holds  -KR         Exercise 6    Exercise Name 6  Resisted hip abduction w/blue t-band  -KR      Cueing 6  Verbal  -KR      Sets 6  1  -KR      Reps 6  10  -KR      Time 6  6 sec holds  -KR        User Key  (r) = Recorded By, (t) = Taken By, (c) = Cosigned By    Initials Name Provider Type    Kathy Jamil, PT DPT Physical Therapist                      PT OP Goals     Row Name 05/08/19 0752          PT Short Term Goals    STG Date to Achieve  04/24/19  -KR     STG 1  Patient will begin a home exercise program to accelerate the recovery  process   -KR     STG 1 Progress  Met  -KR     STG 2  Patient will be able to contract the PFM at a 2/5 grade and hold 6 sec.'s  -KR     STG 2 Progress  Met  -KR        Long Term Goals    LTG Date to Achieve  05/27/20  -KR     LTG 1  Patient will have an increase in levator ani strength to a 4/5 - 4+/5 grade  -KR     LTG 1 Progress  Progressing  -KR     LTG 1 Progress Comments  R 3/5; L 3-/5  -KR     LTG 2  Patient will have 0 - 1 wet episodes in a 2 week period  -KR     LTG 2 Progress  Ongoing  -KR     LTG 3  Patient will be able to perform 12 PFM  contract/relax in a 10 second period  -KR     LTG 3 Progress  Ongoing  -KR     LTG 4  Patient will have an 80 - 90% reduction in pain during sexual intercourse  -KR     LTG 4 Progress  Ongoing  -KR        Time Calculation    PT Goal Re-Cert Due Date  07/02/19  -KR       User Key  (r) = Recorded By, (t) = Taken By, (c) = Cosigned By    Initials Name Provider Type    Kathy Jamil, PT DPT Physical Therapist           Therapy Education  Education Details: Added resisted hip abuction w/blue t-band, resisted hip abd with bridging and isometric hip adduction w/ball. Increased hold time to 10 sec on PFM contract/relax.  Given: HEP, Symptoms/condition management  Program: Reinforced, Progressed  How Provided: Verbal, Demonstration, Written  Provided to: Patient  Level of Understanding: Verbalized, Demonstrated              Time Calculation:   Start Time: 0752  Stop Time: 0832  Time Calculation (min): 40 min  Total Timed Code Minutes- PT: 40 minute(s)  Therapy Charges for Today     Code Description Service Date Service Provider Modifiers Qty    98793528261 HC PT THER PROC EA 15 MIN 5/8/2019 Kathy Kwok, PT DPT GP 3                    Kathy Kwok PT DPT  5/8/2019

## 2019-05-22 ENCOUNTER — HOSPITAL ENCOUNTER (OUTPATIENT)
Dept: PHYSICAL THERAPY | Facility: HOSPITAL | Age: 64
Setting detail: THERAPIES SERIES
Discharge: HOME | End: 2019-05-22

## 2019-05-22 DIAGNOSIS — N39.46 MIXED STRESS AND URGE URINARY INCONTINENCE: Primary | ICD-10-CM

## 2019-05-22 DIAGNOSIS — N94.10 DYSPAREUNIA, FEMALE: ICD-10-CM

## 2019-05-22 PROCEDURE — 97110 THERAPEUTIC EXERCISES: CPT | Performed by: PHYSICAL THERAPIST

## 2019-05-22 NOTE — THERAPY TREATMENT NOTE
Outpatient Physical Therapy Pelvic Health Progress Note  [facility]     Patient Name: Tawny Shin  : 1955  MRN: [id number]  Today's Date: 2019        Visit Date: 2019    Visit Dx:    ICD-10-CM ICD-9-CM   1. Mixed stress and urge urinary incontinence N39.46 788.33   2. Dyspareunia, female N94.10 625.0       Patient Active Problem List   Diagnosis   • Eustachian tube dysfunction   • Sensorineural hearing loss   • Lumbago of multiple sites in spine with sciatica   • Spondylolisthesis of lumbar region        Pelvic Health     Row Name 19 0800             Pelvic Floor Muscle    Strength (Right)  3: Squeeze with/without lift  -KR      Strength (Left)  3: Squeeze with/without lift  -KR      Symmetry of Sustained Maximal Contraction  Symmetrical  -KR      Endurance (Ability to Hold Maximal Contraction)  10 sec  -KR      # of Reps of Maximal Contractions while Maintaining Endurance and Strength  3 sets  -KR      Fast Contraction (# of 1 sec contractions performed)  -- 12  -KR      Interior Muscle Comments  Verbal consent was provided by patient to perform internal pelvic floor assessment.  -KR        User Key  (r) = Recorded By, (t) = Taken By, (c) = Cosigned By    Initials Name Provider Type    Kathy Jamil, PT DPT Physical Therapist        PT Ortho     Row Name 19 0800       Myotomal Screen- Lower Quarter Clearing    Hip flexion (L2)  Bilateral:;4+ (Good +)  -KR    Knee extension (L3)  Bilateral:;4+ (Good +)  -KR    Ankle DF (L4)  Bilateral:;5 (Normal)  -KR    Great toe extension (L5)  Bilateral:;5 (Normal)  -KR    Ankle PF (S1)  Bilateral:;4+ (Good +)  -KR    Knee flexion (S2)  Bilateral:;5 (Normal)  -KR       General ROM    GENERAL ROM COMMENTS  Hip IR/ER WNL bilaterally  -KR      User Key  (r) = Recorded By, (t) = Taken By, (c) = Cosigned By    Initials Name Provider Type    Kathy Jamil, PT DPT Physical Therapist                     PT Assessment/Plan     Row Name 19  0800          PT Assessment    Assessment Comments  Mrs. Shin is progressing well with strength, endurance and timing of the pelvic floor musculature.  She is not having any leakage, nocturia or frequency of urination.  She will be ready for discharge when she meets the goal of bilateral levator ani strength to a 4/5 grade.  Strength has improved in LE's, gait remains somewhat antalgic due to stress fracture R foot.   -KR        PT Plan    PT Plan Comments  Will continue with HEP to progress in strength and timing of pelvic floor musculature.   -KR       User Key  (r) = Recorded By, (t) = Taken By, (c) = Cosigned By    Initials Name Provider Type    Kathy Jamil, PT DPT Physical Therapist            Exercises     Row Name 05/22/19 0800             Subjective Comments    Subjective Comments  Patient reports she has not had any leakages since last session.  She is not getting up at night to use the bathroom.  Sexual intercourse is becoming less painful slowly but consistently.    -KR         Subjective Pain    Able to rate subjective pain?  yes  -KR      Pre-Treatment Pain Level  0  -KR      Post-Treatment Pain Level  0  -KR         Total Minutes    12252 - PT Therapeutic Exercise Minutes  30  -KR         Exercise 1    Exercise Name 1  PFM contractt/relax  -KR      Cueing 1  Verbal;Tactile  -KR      Sets 1  3  -KR      Reps 1  8  -KR      Time 1  10 sec holds  -KR         Exercise 2    Exercise Name 2  PFM quick contract/relax  -KR      Cueing 2  Verbal;Tactile  -KR      Sets 2  3  -KR      Reps 2  11  -KR      Additional Comments  10 sec increments  -KR         Exercise 3    Exercise Name 3  Bridging w/resisted hip abduction  -KR      Cueing 3  Verbal  -KR      Sets 3  1  -KR      Reps 3  10  -KR      Additional Comments  TA and PFM activation  -KR        User Key  (r) = Recorded By, (t) = Taken By, (c) = Cosigned By    Initials Name Provider Type    Kathy Jamil, PT DPT Physical Therapist                       PT OP Goals     Row Name 05/22/19 0800          PT Short Term Goals    STG Date to Achieve  04/24/19  -KR     STG 1  Patient will begin a home exercise program to accelerate the recovery  process  -KR     STG 1 Progress  Met  -KR     STG 2  Patient will be able to contract the PFM at a 2/5 grade and hold 6 sec.'s  -KR     STG 2 Progress  Met  -KR        Long Term Goals    LTG Date to Achieve  05/27/20  -KR     LTG 1  Patient will have an increase in levator ani strength to a 4/5 - 4+/5 grade  -KR     LTG 1 Progress  Progressing  -KR     LTG 1 Progress Comments  Strength 3+/5 dillon. levator ani  -KR     LTG 2  Patient will have 0 - 1 wet episodes in a 2 week period  -KR     LTG 2 Progress  Met  -KR     LTG 3  Patient will be able to perform 12 PFM  contract/relax in a 10 second period  -KR     LTG 3 Progress  Progressing  -KR     LTG 3 Progress Comments  able to perform 11 PFM quick contract/relax  -KR     LTG 4  Patient will have an 80 - 90% reduction in pain during sexual intercourse  -KR     LTG 4 Progress  Progressing  -KR     LTG 4 Progress Comments  25% reduction in pain  -KR        Time Calculation    PT Goal Re-Cert Due Date  07/02/19  -KR       User Key  (r) = Recorded By, (t) = Taken By, (c) = Cosigned By    Initials Name Provider Type    Kathy Jamil, PT DPT Physical Therapist           Therapy Education  Education Details: Reviewed HEP  Given: HEP, Symptoms/condition management  Program: Reinforced  How Provided: Verbal, Demonstration  Provided to: Patient  Level of Understanding: Verbalized, Demonstrated              Time Calculation:   Start Time: 0800  Stop Time: 0830  Time Calculation (min): 30 min  Total Timed Code Minutes- PT: 30 minute(s)  Therapy Charges for Today     Code Description Service Date Service Provider Modifiers Qty    70025428179 HC PT THER PROC EA 15 MIN 5/22/2019 Kathy Kwok, PT DPT GP 2                    Kathy Kwok, PT DPT  5/22/2019

## 2019-05-29 ENCOUNTER — HOSPITAL ENCOUNTER (OUTPATIENT)
Dept: PHYSICAL THERAPY | Facility: HOSPITAL | Age: 64
Setting detail: THERAPIES SERIES
Discharge: HOME | End: 2019-05-29

## 2019-06-05 ENCOUNTER — HOSPITAL ENCOUNTER (OUTPATIENT)
Dept: PHYSICAL THERAPY | Facility: HOSPITAL | Age: 64
Setting detail: THERAPIES SERIES
End: 2019-06-05

## 2019-06-10 ENCOUNTER — TRANSCRIBE ORDERS (OUTPATIENT)
Dept: ADMINISTRATIVE | Facility: HOSPITAL | Age: 64
End: 2019-06-10

## 2019-06-10 ENCOUNTER — LAB (OUTPATIENT)
Dept: LAB | Facility: HOSPITAL | Age: 64
End: 2019-06-10

## 2019-06-10 DIAGNOSIS — B95.1 GBBS (GROUP B BETA HEMOLYTIC STREPTOCOCCUS) PHARYNGITIS: ICD-10-CM

## 2019-06-10 DIAGNOSIS — J02.0 GBBS (GROUP B BETA HEMOLYTIC STREPTOCOCCUS) PHARYNGITIS: ICD-10-CM

## 2019-06-10 DIAGNOSIS — T84.53XD INFECTION OF TOTAL RIGHT KNEE REPLACEMENT, SUBSEQUENT ENCOUNTER: Primary | ICD-10-CM

## 2019-06-10 DIAGNOSIS — T84.53XD INFECTION OF TOTAL RIGHT KNEE REPLACEMENT, SUBSEQUENT ENCOUNTER: ICD-10-CM

## 2019-06-10 LAB
ALBUMIN SERPL-MCNC: 3.6 G/DL (ref 3.5–5)
ALBUMIN/GLOB SERPL: 1 G/DL (ref 1.1–2.5)
ALP SERPL-CCNC: 69 U/L (ref 24–120)
ALT SERPL W P-5'-P-CCNC: <15 U/L (ref 0–54)
ANION GAP SERPL CALCULATED.3IONS-SCNC: 3 MMOL/L (ref 4–13)
AST SERPL-CCNC: 31 U/L (ref 7–45)
AUTO MIXED CELLS #: 1 10*3/MM3 (ref 0.1–2.6)
AUTO MIXED CELLS %: 21.9 % (ref 0.1–24)
BILIRUB SERPL-MCNC: 0.4 MG/DL (ref 0.1–1)
BUN BLD-MCNC: 20 MG/DL (ref 5–21)
BUN/CREAT SERPL: 16.1
CALCIUM SPEC-SCNC: 9.9 MG/DL (ref 8.4–10.4)
CHLORIDE SERPL-SCNC: 100 MMOL/L (ref 98–110)
CO2 SERPL-SCNC: 33 MMOL/L (ref 24–31)
CREAT BLD-MCNC: 1.24 MG/DL (ref 0.5–1.4)
CRP SERPL-MCNC: <0.5 MG/DL (ref 0–0.99)
ERYTHROCYTE [DISTWIDTH] IN BLOOD BY AUTOMATED COUNT: 15.1 % (ref 12–15)
GFR SERPL CREATININE-BSD FRML MDRD: 43 ML/MIN/1.73
GLOBULIN UR ELPH-MCNC: 3.5 GM/DL
GLUCOSE BLD-MCNC: 92 MG/DL (ref 70–100)
HCT VFR BLD AUTO: 36.9 % (ref 37–47)
HGB BLD-MCNC: 12.2 G/DL (ref 12–16)
LYMPHOCYTES # BLD AUTO: 2.2 10*3/MM3 (ref 0.7–3.1)
LYMPHOCYTES NFR BLD AUTO: 49.2 % (ref 15–45)
MCH RBC QN AUTO: 31.4 PG (ref 28–32)
MCHC RBC AUTO-ENTMCNC: 33.1 G/DL (ref 33–36)
MCV RBC AUTO: 94.9 FL (ref 82–98)
NEUTROPHILS # BLD AUTO: 1.3 10*3/MM3 (ref 1.5–8.3)
NEUTROPHILS NFR BLD AUTO: 28.9 % (ref 39–78)
PLATELET # BLD AUTO: 170 10*3/MM3 (ref 130–400)
PMV BLD AUTO: 9.8 FL (ref 6–12)
POTASSIUM BLD-SCNC: 4.8 MMOL/L (ref 3.5–5.3)
PROT SERPL-MCNC: 7.1 G/DL (ref 6.3–8.7)
RBC # BLD AUTO: 3.89 10*6/MM3 (ref 4.2–5.4)
SODIUM BLD-SCNC: 136 MMOL/L (ref 135–145)
WBC NRBC COR # BLD: 4.5 10*3/MM3 (ref 4.8–10.8)

## 2019-06-10 PROCEDURE — 80053 COMPREHEN METABOLIC PANEL: CPT | Performed by: INTERNAL MEDICINE

## 2019-06-10 PROCEDURE — 85025 COMPLETE CBC W/AUTO DIFF WBC: CPT | Performed by: INTERNAL MEDICINE

## 2019-06-10 PROCEDURE — 86140 C-REACTIVE PROTEIN: CPT | Performed by: INTERNAL MEDICINE

## 2019-06-10 PROCEDURE — 36415 COLL VENOUS BLD VENIPUNCTURE: CPT | Performed by: INTERNAL MEDICINE

## 2019-06-25 ENCOUNTER — OFFICE VISIT (OUTPATIENT)
Dept: CARDIOLOGY | Age: 64
End: 2019-06-25
Payer: MEDICARE

## 2019-06-25 VITALS
DIASTOLIC BLOOD PRESSURE: 68 MMHG | HEIGHT: 66 IN | BODY MASS INDEX: 31.18 KG/M2 | HEART RATE: 68 BPM | SYSTOLIC BLOOD PRESSURE: 126 MMHG | WEIGHT: 194 LBS

## 2019-06-25 DIAGNOSIS — Z95.0 PACEMAKER: ICD-10-CM

## 2019-06-25 DIAGNOSIS — E78.00 PURE HYPERCHOLESTEROLEMIA: ICD-10-CM

## 2019-06-25 DIAGNOSIS — M79.10 MYALGIA DUE TO STATIN: ICD-10-CM

## 2019-06-25 DIAGNOSIS — T46.6X5A MYALGIA DUE TO STATIN: ICD-10-CM

## 2019-06-25 DIAGNOSIS — I25.10 CORONARY ARTERY DISEASE INVOLVING NATIVE CORONARY ARTERY OF NATIVE HEART WITHOUT ANGINA PECTORIS: Primary | ICD-10-CM

## 2019-06-25 DIAGNOSIS — I10 ESSENTIAL HYPERTENSION: ICD-10-CM

## 2019-06-25 DIAGNOSIS — R55 SYNCOPE, CARDIOGENIC: ICD-10-CM

## 2019-06-25 DIAGNOSIS — E78.00 HYPERCHOLESTEROLEMIA: ICD-10-CM

## 2019-06-25 PROCEDURE — 1123F ACP DISCUSS/DSCN MKR DOCD: CPT | Performed by: CLINICAL NURSE SPECIALIST

## 2019-06-25 PROCEDURE — 99214 OFFICE O/P EST MOD 30 MIN: CPT | Performed by: CLINICAL NURSE SPECIALIST

## 2019-06-25 PROCEDURE — 1090F PRES/ABSN URINE INCON ASSESS: CPT | Performed by: CLINICAL NURSE SPECIALIST

## 2019-06-25 PROCEDURE — 93280 PM DEVICE PROGR EVAL DUAL: CPT | Performed by: CLINICAL NURSE SPECIALIST

## 2019-06-25 PROCEDURE — G8400 PT W/DXA NO RESULTS DOC: HCPCS | Performed by: CLINICAL NURSE SPECIALIST

## 2019-06-25 PROCEDURE — G8598 ASA/ANTIPLAT THER USED: HCPCS | Performed by: CLINICAL NURSE SPECIALIST

## 2019-06-25 PROCEDURE — G8427 DOCREV CUR MEDS BY ELIG CLIN: HCPCS | Performed by: CLINICAL NURSE SPECIALIST

## 2019-06-25 PROCEDURE — 4040F PNEUMOC VAC/ADMIN/RCVD: CPT | Performed by: CLINICAL NURSE SPECIALIST

## 2019-06-25 PROCEDURE — 3017F COLORECTAL CA SCREEN DOC REV: CPT | Performed by: CLINICAL NURSE SPECIALIST

## 2019-06-25 PROCEDURE — 1036F TOBACCO NON-USER: CPT | Performed by: CLINICAL NURSE SPECIALIST

## 2019-06-25 PROCEDURE — G8417 CALC BMI ABV UP PARAM F/U: HCPCS | Performed by: CLINICAL NURSE SPECIALIST

## 2019-06-25 RX ORDER — ISOSORBIDE MONONITRATE 60 MG/1
60 TABLET, EXTENDED RELEASE ORAL DAILY
Qty: 30 TABLET | Refills: 5 | Status: ON HOLD | OUTPATIENT
Start: 2019-06-25 | End: 2019-09-03 | Stop reason: SDUPTHER

## 2019-06-25 ASSESSMENT — ENCOUNTER SYMPTOMS
EYE REDNESS: 0
VOMITING: 0
COUGH: 0
WHEEZING: 0
SHORTNESS OF BREATH: 0
ABDOMINAL PAIN: 0
FACIAL SWELLING: 0
CHEST TIGHTNESS: 1
NAUSEA: 0

## 2019-06-25 NOTE — PROGRESS NOTES
[facility], [address] [address]  Phone: [phone]  Fax: [phone]    OFFICE VISIT:  2019    Bonnie Brown - : 1955    Reason For Visit:  Trudy Howell is a 70 y.o. female who is here for Coronary Artery Disease (Patient is having chest pain and discomfort today.)    HPI  Patient is here for follow-up with a history of CAD, sinus node dysfunction, pacemaker, hyperlipidemia, statin intolerance, vasovagal syncope. She reports she has not been feeling well and awakened last night with a sensation of a band around her left upper arm and some chest discomfort. She states she also experienced some chest discomfort while walking into the building today which is now resolved. Her   about 1 week ago and she has been under some significant stress. She denies unusual dyspnea, orthopnea, PND, edema, or palpitations. Teresa Vallejo MD is PCP.   Bonnie Brown has the following history as recorded in [facility]:    Patient Active Problem List    Diagnosis Date Noted    Myalgia due to statin 2019    Infection of total right knee replacement ([address] 2018    Primary osteoarthritis of right knee 2018    Leg swelling 2018    Spondylolisthesis of lumbar region 2018    Lumbar stenosis with neurogenic claudication 2018    Sinus node dysfunction ([address]     Pacemaker 2016    Left carotid bruit 2016    S/P coronary artery stent placement 2016    Chest pain 2016    Status post placement of implantable loop recorder 2015    Syncope, cardiogenic 2015    Near syncope 2015    CAD (coronary artery disease)     Hypertension     Rheumatoid arteritis     History of pulmonary fibrosis     History of DVT (deep vein thrombosis)     Thyroid disease     Intrinsic asthma     Hypercholesterolemia     History of neutropenia      Past Medical History:   Diagnosis Date    CAD (coronary artery disease)     S/p Stent to right coronary artery 9/2010. S/p myocardial infarction May 2013.  Cellulitis     LT LEG IN PAST    History of blood transfusion     History of DVT (deep vein thrombosis)     LT    History of neutropenia     History of pulmonary fibrosis     Hx of blood clots 1992    LT LEG    Hypercholesterolemia     Hypertension     Intrinsic asthma     Pacemaker 11/17/2016    Post op infection     \"right knee leaking after my replacement\"    Rheumatoid arteritis     Sinus node dysfunction ([address]     Staph aureus infection     Status post placement of implantable loop recorder 2/13/15, 3/30/15    2/19/15  removed, infection    Syncope 2/11/2015    Thyroid disease     HYPOTHYROIDISM     Past Surgical History:   Procedure Laterality Date    BACK SURGERY  01/01/2018    CARDIAC CATHETERIZATION  5/14/14  MDL    normal LV function. EF 60%    CARDIAC CATHETERIZATION  1/26/15  MDL    EF 60%, Left circ has long 80-90% in small 2mm vessel, diffuse disease in RCA    CORONARY ANGIOPLASTY WITH STENT PLACEMENT  2014    DIAGNOSTIC CARDIAC CATH LAB PROCEDURE      Right coronary artery stent.  9/2010    JOINT REPLACEMENT      LEEP      PACEMAKER INSERTION  11/17/2016    Dr Garza President; [address]    [place] INCIS/DRAIN THIGH/KNEE ABSCESS,DEEP Right 10/23/2018    KNEE INCISION AND DRAINAGE performed by Kirsten Rahman MD at [facility] 90 Right 9/11/2018    KNEE TOTAL ARTHROPLASTY performed by Kirsten Rahman MD at [place] [address] Right 12/6/2018    KNEE EXPLANT AND PLACEMENT OF ANTIBIOTIC SPACER performed by Kirsten Rahman MD at [facility] OR     Family History   Problem Relation Age of Onset    Cancer Mother         LUNG CA    Heart Attack Father 39        FATAL MI     Social History     Tobacco Use    Smoking status: Never Smoker    Smokeless tobacco: Never Used Substance Use Topics    Alcohol use: No      Current Outpatient Medications   Medication Sig Dispense Refill    isosorbide mononitrate (IMDUR) 60 MG extended release tablet Take 1 tablet by mouth daily 30 tablet 5    clopidogrel (PLAVIX) 75 MG tablet TAKE 1 TABLET BY MOUTH DAILY 90 tablet 3    Evolocumab (REPATHA SURECLICK) 727 MG/ML SOAJ Inject 1 mL into the skin every 14 days 2 pen 11    Sulfamethoxazole-Trimethoprim (BACTRIM PO) Take by mouth      carvedilol (COREG) 12.5 MG tablet Take 1 tablet by mouth daily 90 tablet 3    rivaroxaban (XARELTO) 10 MG TABS tablet Take 1 tablet by mouth daily (with breakfast) 28 tablet 0    salsalate (DISALCID) 500 MG tablet Take 2,500 mg by mouth See Admin Instructions Monday, Wednesday, Friday and Sunday      salsalate (DISALCID) 500 MG tablet Take 2,000 mg by mouth See Admin Instructions Saturday, Tuesday and Thursday      Multiple Vitamins-Minerals (THERAPEUTIC MULTIVITAMIN-MINERALS) tablet Take 1 tablet by mouth daily      denosumab (PROLIA) 60 MG/ML SOLN SC injection Inject 60 mg into the skin once      docusate sodium (COLACE) 100 MG capsule Take 1 capsule by mouth daily To prevent constipation 20 capsule 0    predniSONE (DELTASONE) 1 MG tablet Take 2 mg by mouth daily      traMADol-acetaminophen (ULTRACET) 37.5-325 MG per tablet Take 1 tablet by mouth 2 times daily. [facility] nitroGLYCERIN (NITROSTAT) 0.4 MG SL tablet Place 1 tablet under the tongue every 5 minutes as needed for Chest pain 25 tablet 3    pregabalin (LYRICA) 50 MG capsule Take 1 capsule by mouth daily for 28 days.  60 capsule 0    valACYclovir (VALTREX) 500 MG tablet Take 1 tablet by mouth daily 30 tablet 0    pantoprazole (PROTONIX) 40 MG tablet Take 1 tablet by mouth daily 30 tablet 3    fluticasone (FLONASE) 50 MCG/ACT nasal spray 2 sprays by Nasal route daily      spironolactone (ALDACTONE) 25 MG tablet Take 25 mg by mouth daily      ergocalciferol (ERGOCALCIFEROL) 32577 units capsule

## 2019-06-25 NOTE — PATIENT INSTRUCTIONS
Return in about 1 month (around 7/25/2019) for APRN. Check Lipids fasting  Start process for Repatha  Increase Isosorbide to 60mg daily    Call with any questionsor concerns  Follow up with Orestes Johnson MD for non cardiac problems  Report any new problems  Cardiovascular Fitness-Exercise as tolerated. Strive for 15 minutes of exercise most days of the week. Cardiac / HealthyDiet  Continue current medications as directed  Continue plan of treatment  It is always recommended that you bring your medicationsbottles with you to each visit - this is for your safety!

## 2019-06-28 DIAGNOSIS — I10 ESSENTIAL HYPERTENSION: ICD-10-CM

## 2019-06-28 RX ORDER — CARVEDILOL 12.5 MG/1
12.5 TABLET ORAL DAILY
Qty: 90 TABLET | Refills: 3 | Status: SHIPPED | OUTPATIENT
Start: 2019-06-28 | End: 2019-07-05 | Stop reason: SDUPTHER

## 2019-07-05 ENCOUNTER — APPOINTMENT (OUTPATIENT)
Dept: LAB | Facility: HOSPITAL | Age: 64
End: 2019-07-05

## 2019-07-05 ENCOUNTER — TRANSCRIBE ORDERS (OUTPATIENT)
Dept: ADMINISTRATIVE | Facility: HOSPITAL | Age: 64
End: 2019-07-05

## 2019-07-05 DIAGNOSIS — E78.00 PURE HYPERCHOLESTEROLEMIA: Primary | ICD-10-CM

## 2019-07-05 DIAGNOSIS — I10 ESSENTIAL HYPERTENSION: ICD-10-CM

## 2019-07-05 LAB
ALT SERPL W P-5'-P-CCNC: 17 U/L (ref 0–54)
ARTICHOKE IGE QN: 131 MG/DL (ref 0–99)
AST SERPL-CCNC: 35 U/L (ref 7–45)
CHOLEST SERPL-MCNC: 214 MG/DL (ref 130–200)
HDLC SERPL-MCNC: 47 MG/DL
LDLC/HDLC SERPL: 2.76 {RATIO}
TRIGL SERPL-MCNC: 187 MG/DL (ref 0–149)

## 2019-07-05 PROCEDURE — 84460 ALANINE AMINO (ALT) (SGPT): CPT | Performed by: CLINICAL NURSE SPECIALIST

## 2019-07-05 PROCEDURE — 80061 LIPID PANEL: CPT | Performed by: CLINICAL NURSE SPECIALIST

## 2019-07-05 PROCEDURE — 36415 COLL VENOUS BLD VENIPUNCTURE: CPT | Performed by: CLINICAL NURSE SPECIALIST

## 2019-07-05 PROCEDURE — 84450 TRANSFERASE (AST) (SGOT): CPT | Performed by: CLINICAL NURSE SPECIALIST

## 2019-07-05 RX ORDER — CARVEDILOL 12.5 MG/1
12.5 TABLET ORAL DAILY
Qty: 90 TABLET | Refills: 3 | Status: ON HOLD | OUTPATIENT
Start: 2019-07-05 | End: 2019-09-03 | Stop reason: SDUPTHER

## 2019-07-08 NOTE — THERAPY DISCHARGE NOTE
Outpatient Physical Therapy Discharge Summary  [facility]       Patient Name: Tawny Shin  : 1955  MRN: [id number]    Today's Date: 2019    Visit Dx:  No diagnosis found.    PT OP Goals     Row Name 19 0901          PT Short Term Goals    STG Date to Achieve  19  -KR     STG 1  Patient will begin a home exercise program to accelerate the recovery  process  -KR     STG 1 Progress  Met  -KR     STG 2  Patient will be able to contract the PFM at a 2/5 grade and hold 6 sec.'s  -KR     STG 2 Progress  Met  -KR        Long Term Goals    LTG Date to Achieve  20  -KR     LTG 1  Patient will have an increase in levator ani strength to a 4/5 - 4+/5 grade  -KR     LTG 1 Progress  Not Met  -KR     LTG 2  Patient will have 0 - 1 wet episodes in a 2 week period  -KR     LTG 2 Progress  Met  -KR     LTG 3  Patient will be able to perform 12 PFM  contract/relax in a 10 second period  -KR     LTG 3 Progress  Not Met  -KR     LTG 4  Patient will have an 80 - 90% reduction in pain during sexual intercourse  -KR     LTG 4 Progress  Not Met  -KR       User Key  (r) = Recorded By, (t) = Taken By, (c) = Cosigned By    Initials Name Provider Type    Kathy Jamil, PT DPT Physical Therapist          OP PT Discharge Summary  Date of Discharge: 19  Reason for Discharge: Patient/Caregiver request  Outcomes Achieved: Refer to plan of care for updates on goals achieved  Discharge Destination: Home with home program  Discharge Instructions/Additional Comments: Patient has been given a home exercise program. Her  passed away in his sleep unexpectedly on Father's Day and she is not going to return to PT at this time.      Time Calculation:                    Kathy Kwok PT DPT  2019

## 2019-07-10 ENCOUNTER — TELEPHONE (OUTPATIENT)
Dept: CARDIOLOGY | Age: 64
End: 2019-07-10

## 2019-07-19 ENCOUNTER — LAB (OUTPATIENT)
Dept: LAB | Facility: HOSPITAL | Age: 64
End: 2019-07-19

## 2019-07-19 ENCOUNTER — TRANSCRIBE ORDERS (OUTPATIENT)
Dept: ADMINISTRATIVE | Facility: HOSPITAL | Age: 64
End: 2019-07-19

## 2019-07-19 DIAGNOSIS — T84.53XA INFECTION OF TOTAL RIGHT KNEE REPLACEMENT, INITIAL ENCOUNTER (HCC): ICD-10-CM

## 2019-07-19 DIAGNOSIS — T84.53XA INFECTION OF TOTAL RIGHT KNEE REPLACEMENT, INITIAL ENCOUNTER (HCC): Primary | ICD-10-CM

## 2019-07-19 DIAGNOSIS — B95.1 GBBS (GROUP B BETA HEMOLYTIC STREPTOCOCCUS) PHARYNGITIS: ICD-10-CM

## 2019-07-19 DIAGNOSIS — J02.0 GBBS (GROUP B BETA HEMOLYTIC STREPTOCOCCUS) PHARYNGITIS: ICD-10-CM

## 2019-07-19 LAB
AUTO MIXED CELLS #: 0.8 10*3/MM3 (ref 0.1–2.6)
AUTO MIXED CELLS %: 18.3 % (ref 0.1–24)
ERYTHROCYTE [DISTWIDTH] IN BLOOD BY AUTOMATED COUNT: 16.2 % (ref 12–15)
HCT VFR BLD AUTO: 37.5 % (ref 37–47)
HGB BLD-MCNC: 11.9 G/DL (ref 12–16)
LYMPHOCYTES # BLD AUTO: 1.7 10*3/MM3 (ref 0.7–3.1)
LYMPHOCYTES NFR BLD AUTO: 41.8 % (ref 15–45)
MCH RBC QN AUTO: 30.2 PG (ref 28–32)
MCHC RBC AUTO-ENTMCNC: 31.7 G/DL (ref 33–36)
MCV RBC AUTO: 95.2 FL (ref 82–98)
NEUTROPHILS # BLD AUTO: 1.6 10*3/MM3 (ref 1.5–8.3)
NEUTROPHILS NFR BLD AUTO: 39.9 % (ref 39–78)
PLATELET # BLD AUTO: 175 10*3/MM3 (ref 130–400)
PMV BLD AUTO: 8.7 FL (ref 6–12)
RBC # BLD AUTO: 3.94 10*6/MM3 (ref 4.2–5.4)
WBC NRBC COR # BLD: 4.1 10*3/MM3 (ref 4.8–10.8)

## 2019-07-19 PROCEDURE — 36415 COLL VENOUS BLD VENIPUNCTURE: CPT

## 2019-07-19 PROCEDURE — 85025 COMPLETE CBC W/AUTO DIFF WBC: CPT

## 2019-07-19 PROCEDURE — 86140 C-REACTIVE PROTEIN: CPT | Performed by: INTERNAL MEDICINE

## 2019-07-20 LAB — CRP SERPL-MCNC: <0.5 MG/DL (ref 0–0.99)

## 2019-07-23 ENCOUNTER — TELEPHONE (OUTPATIENT)
Dept: CARDIOLOGY | Age: 64
End: 2019-07-23

## 2019-07-23 ENCOUNTER — OFFICE VISIT (OUTPATIENT)
Dept: CARDIOLOGY | Age: 64
End: 2019-07-23
Payer: MEDICARE

## 2019-07-23 VITALS
WEIGHT: 195 LBS | OXYGEN SATURATION: 94 % | SYSTOLIC BLOOD PRESSURE: 110 MMHG | DIASTOLIC BLOOD PRESSURE: 60 MMHG | HEART RATE: 75 BPM | BODY MASS INDEX: 31.34 KG/M2 | HEIGHT: 66 IN

## 2019-07-23 DIAGNOSIS — I10 ESSENTIAL HYPERTENSION: ICD-10-CM

## 2019-07-23 DIAGNOSIS — T46.6X5A MYALGIA DUE TO STATIN: ICD-10-CM

## 2019-07-23 DIAGNOSIS — I25.118 CORONARY ARTERY DISEASE OF NATIVE ARTERY OF NATIVE HEART WITH STABLE ANGINA PECTORIS (HCC): ICD-10-CM

## 2019-07-23 DIAGNOSIS — R07.89 OTHER CHEST PAIN: Primary | ICD-10-CM

## 2019-07-23 DIAGNOSIS — E78.00 HYPERCHOLESTEROLEMIA: ICD-10-CM

## 2019-07-23 DIAGNOSIS — Z95.0 PACEMAKER: ICD-10-CM

## 2019-07-23 DIAGNOSIS — M79.10 MYALGIA DUE TO STATIN: ICD-10-CM

## 2019-07-23 PROCEDURE — 1090F PRES/ABSN URINE INCON ASSESS: CPT | Performed by: CLINICAL NURSE SPECIALIST

## 2019-07-23 PROCEDURE — G8400 PT W/DXA NO RESULTS DOC: HCPCS | Performed by: CLINICAL NURSE SPECIALIST

## 2019-07-23 PROCEDURE — 99214 OFFICE O/P EST MOD 30 MIN: CPT | Performed by: CLINICAL NURSE SPECIALIST

## 2019-07-23 PROCEDURE — 3017F COLORECTAL CA SCREEN DOC REV: CPT | Performed by: CLINICAL NURSE SPECIALIST

## 2019-07-23 PROCEDURE — 1123F ACP DISCUSS/DSCN MKR DOCD: CPT | Performed by: CLINICAL NURSE SPECIALIST

## 2019-07-23 PROCEDURE — 93288 INTERROG EVL PM/LDLS PM IP: CPT | Performed by: CLINICAL NURSE SPECIALIST

## 2019-07-23 PROCEDURE — G8598 ASA/ANTIPLAT THER USED: HCPCS | Performed by: CLINICAL NURSE SPECIALIST

## 2019-07-23 PROCEDURE — 4040F PNEUMOC VAC/ADMIN/RCVD: CPT | Performed by: CLINICAL NURSE SPECIALIST

## 2019-07-23 PROCEDURE — G8427 DOCREV CUR MEDS BY ELIG CLIN: HCPCS | Performed by: CLINICAL NURSE SPECIALIST

## 2019-07-23 PROCEDURE — G8417 CALC BMI ABV UP PARAM F/U: HCPCS | Performed by: CLINICAL NURSE SPECIALIST

## 2019-07-23 PROCEDURE — 1036F TOBACCO NON-USER: CPT | Performed by: CLINICAL NURSE SPECIALIST

## 2019-07-23 RX ORDER — NITROGLYCERIN 0.4 MG/1
0.4 TABLET SUBLINGUAL EVERY 5 MIN PRN
Qty: 25 TABLET | Refills: 3 | Status: SHIPPED | OUTPATIENT
Start: 2019-07-23

## 2019-07-23 ASSESSMENT — ENCOUNTER SYMPTOMS
NAUSEA: 0
COUGH: 0
SHORTNESS OF BREATH: 0
VOMITING: 0
CHEST TIGHTNESS: 1
EYE REDNESS: 0
ABDOMINAL PAIN: 0
FACIAL SWELLING: 0
WHEEZING: 0

## 2019-07-23 NOTE — PROGRESS NOTES
 Rheumatoid arteritis     History of pulmonary fibrosis     History of DVT (deep vein thrombosis)     Thyroid disease     Intrinsic asthma     Hypercholesterolemia     History of neutropenia      Past Medical History:   Diagnosis Date    CAD (coronary artery disease)     S/p Stent to right coronary artery 9/2010. S/p myocardial infarction May 2013.  Cellulitis     LT LEG IN PAST    History of blood transfusion     History of DVT (deep vein thrombosis)     LT    History of neutropenia     History of pulmonary fibrosis     Hx of blood clots 1992    LT LEG    Hypercholesterolemia     Hypertension     Intrinsic asthma     Pacemaker 11/17/2016    Post op infection     \"right knee leaking after my replacement\"    Rheumatoid arteritis     Sinus node dysfunction ([address]     Staph aureus infection     Status post placement of implantable loop recorder 2/13/15, 3/30/15    2/19/15  removed, infection    Syncope 2/11/2015    Thyroid disease     HYPOTHYROIDISM     Past Surgical History:   Procedure Laterality Date    BACK SURGERY  01/01/2018    CARDIAC CATHETERIZATION  5/14/14  MDL    normal LV function. EF 60%    CARDIAC CATHETERIZATION  1/26/15  MDL    EF 60%, Left circ has long 80-90% in small 2mm vessel, diffuse disease in RCA    CORONARY ANGIOPLASTY WITH STENT PLACEMENT  2014    DIAGNOSTIC CARDIAC CATH LAB PROCEDURE      Right coronary artery stent.  9/2010    JOINT REPLACEMENT      LEEP      PACEMAKER INSERTION  11/17/2016    Dr Maria Isabel Almodovar; medtronic    PACEMAKER PLACEMENT      medtronic    MS INCIS/DRAIN THIGH/KNEE ABSCESS,DEEP Right 10/23/2018    KNEE INCISION AND DRAINAGE performed by Greg Raymundo MD at [facility] 90 Right 9/11/2018    KNEE TOTAL ARTHROPLASTY performed by Greg Raymundo MD at [place] [address] Right 12/6/2018    KNEE EXPLANT AND PLACEMENT OF ANTIBIOTIC SPACER performed by Greg Raymundo daily      ergocalciferol (ERGOCALCIFEROL) 59424 units capsule Take 50,000 Units by mouth once a week      cloNIDine (CATAPRES) 0.1 MG tablet Take 0.2 mg by mouth nightly       levothyroxine (SYNTHROID) 75 MCG tablet Take 75 mcg by mouth Daily       folic acid (FOLVITE) 1 MG tablet Take 1 mg by mouth daily.  cetirizine (ZYRTEC) 10 MG tablet Take 10 mg by mouth daily       ezetimibe (ZETIA) 10 MG tablet Take 10 mg by mouth daily       furosemide (LASIX) 40 MG tablet Take 40 mg by mouth daily.  Evolocumab (REPATHA SURECLICK) 408 MG/ML SOAJ Inject 1 mL into the skin every 14 days (Patient not taking: Reported on 7/23/2019) 2 pen 11    pregabalin (LYRICA) 50 MG capsule Take 1 capsule by mouth daily for 28 days. 60 capsule 0     No current facility-administered medications for this visit. Allergies: Amoxicillin; Lipitor; Niaspan [niacin er]; Penicillins; Relafen [nabumetone]; and Zocor [simvastatin]    Review of Systems  Review of Systems   Constitutional: Positive for fatigue. Negative for activity change, diaphoresis, fever and unexpected weight change. HENT: Negative for facial swelling and nosebleeds. Eyes: Negative for redness and visual disturbance. Respiratory: Positive for chest tightness. Negative for cough, shortness of breath and wheezing. Cardiovascular: Negative for chest pain, palpitations and leg swelling. Gastrointestinal: Negative for abdominal pain, nausea and vomiting. Endocrine: Negative for cold intolerance and heat intolerance. Genitourinary: Negative for dysuria and hematuria. Musculoskeletal: Negative for arthralgias and myalgias. Skin: Negative for pallor and rash. Neurological: Positive for syncope (near). Negative for dizziness, seizures, weakness and light-headedness. Hematological: Does not bruise/bleed easily. Psychiatric/Behavioral: Negative for agitation. The patient is not nervous/anxious.         Objective  Vital Signs - /60   Pulse 75   Ht 5' 6\" (1.676 m)   Wt 195 lb (88.5 kg)   SpO2 94%   BMI 31.47 kg/m²   Physical Exam   Constitutional: She is oriented to person, place, and time. She appears well-developed and well-nourished. HENT:   Head: Normocephalic and atraumatic. Right Ear: Hearing and external ear normal.   Left Ear: Hearing and external ear normal.   Nose: Nose normal.   Eyes: Pupils are equal, round, and reactive to light. Right eye exhibits no discharge. Left eye exhibits no discharge. Neck: No JVD present. No tracheal deviation present. No thyromegaly present. Cardiovascular: Normal rate, regular rhythm, normal heart sounds and intact distal pulses. Exam reveals no gallop and no friction rub. No murmur heard. No carotid bruit   Pulmonary/Chest: Effort normal and breath sounds normal. No respiratory distress. She has no wheezes. She has no rales. Abdominal: Soft. There is no tenderness. Musculoskeletal: She exhibits no edema. Normal gait and station   Neurological: She is alert and oriented to person, place, and time. No cranial nerve deficit. Skin: Skin is warm and dry. No rash noted. There is pallor. Psychiatric: She has a normal mood and affect. Her behavior is normal. Judgment normal.   Nursing note and vitals reviewed. Data:  Manju 2017  Conclusion: Yolanda Oates without evidence of reversible ischemia                        Lexiscan with out evidence of myocardial   infarction   Signed by Dr Guerrero Tinajero on 8/15/2017 3:02 PM       Assessment:     Diagnosis Orders   1. Other chest pain  NM MYOCARDIAL SPECT REST EXERCISE OR RX   2. Coronary artery disease of native artery of native heart with stable angina pectoris ([address]     3. Essential hypertension  nitroGLYCERIN (NITROSTAT) 0.4 MG SL tablet   4. Hypercholesterolemia     5. Myalgia due to statin     6. Pacemaker       Chest pain/CAD- no improvement in symptoms after increasing isosorbide.   Plan will be to check a Lexiscan nuclear stress test.  She would like to change from Plavix to Brilinta which she has several bottles of at home and would like to use up. Discontinue Plavix and start Brilinta 90 mg twice a day    Hypercholesterolemia/ myalgia related to statin-has tried Atorvastatin and Pravastatin and is unable to tolerate due to myalgias. Plan will be to start the process for Repatha. Reviewed recent lipids under the media tab with LDL cholesterol of 131    Hypertension-well-controlled on current regimen  Near Syncope/ pacemaker-quick interrogation shows no rhythm issues   associated with her near syncopal episode. Reviewed all recent blood work with no significant findings    Plan    Orders Placed This Encounter   Procedures    NM MYOCARDIAL SPECT REST EXERCISE OR RX     With myocardial perfusion study with sestamibi     Standing Status:   Future     Standing Expiration Date:   7/23/2020     Order Specific Question:   Reason for Exam?     Answer:   Chest pain     Order Specific Question:   Procedure Type     Answer:   Rx     Order Specific Question:   Reason for exam:     Answer:   chest pain     Return for Dr. Sunday Alan, as scheduled. [facility] Nuclear Stress Test  OK to change from Plavix to Brilinta 90mg twice a day  Start Repatha process  Discuss need for Xarelto with PCP    Call with any questionsor concerns  Follow up with Solange Swan MD for non cardiac problems  Report any new problems  Cardiovascular Fitness-Exercise as tolerated. Strive for 15 minutes of exercise most days of the week. Cardiac / HealthyDiet  Continue current medications as directed  Continue plan of treatment  It is always recommended that you bring your medicationsbottles with you to each visit - this is for your safety!        Stephane Sheehan, APRN

## 2019-07-23 NOTE — PATIENT INSTRUCTIONS
Return for Dr. Kenroy Atkinson, as scheduled. Lexiscan Nuclear Stress Test  OK to change from Plavix to Brilinta 90mg twice a day  Start Repatha process  Discuss need for Xarelto with PCP        [place] at the [address] and [address] [facility] located on the first floor of [place] [address] through hospital main entrance and turn immediately to your left. Patient's contact number:  [phone] (home)      Lexiscan Stress Test      Lexiscan (regadenoson injection) is a prescription drug given through an IV line that increases blood flow through the arteries of the heart during a cardiac nuclear stress test.     There are two parts to a Lexiscan stress test: the rest portion and the exercise portion. For the rest portion, a radioactive tracer is injected into your arm through the IV. After 30 to 60 minutes, the process of imaging will begin. A nuclear camera will be placed on your chest area and images are taken for the next 15 to 20 minutes. For the exercise portion, a nurse will attach EKG electrodes to your chest to monitor your heart rate. The drug Labolt Gowers is administered to simulate stress on the heart. Your heart rhythm will then be monitored for the next few minutes. Your blood pressure will also be monitored throughout the exercise portion. Bronson through the exercise portion, a second round of radioactive tracer is injected into your body. Your heart rate and EKG will be monitored for another few minutes after administering the drug. Test Preparation:     Bring a list of your current medications. Do not take any of your medications the morning of the test, but bring all morning medications with you as you will take them after the stress portion of the test is completed.  Do not eat Bananas 24 hours prior to test.     No caffeine 24 hours prior to the testing. This includes: coffee, pop/soda, chocolate, cold medications, etc.  Any product that might contain caffeine.

## 2019-08-22 ENCOUNTER — HOSPITAL ENCOUNTER (OUTPATIENT)
Dept: NUCLEAR MEDICINE | Age: 64
Discharge: HOME | End: 2019-08-24
Payer: MEDICARE

## 2019-08-22 ENCOUNTER — HOSPITAL ENCOUNTER (OUTPATIENT)
Dept: NON INVASIVE DIAGNOSTICS | Age: 64
Discharge: HOME | End: 2019-08-22
Payer: MEDICARE

## 2019-08-22 DIAGNOSIS — R07.89 OTHER CHEST PAIN: ICD-10-CM

## 2019-08-22 PROCEDURE — 3430000000 HC RX DIAGNOSTIC RADIOPHARMACEUTICAL: Performed by: CLINICAL NURSE SPECIALIST

## 2019-08-22 PROCEDURE — 93017 CV STRESS TEST TRACING ONLY: CPT

## 2019-08-22 PROCEDURE — A9500 TC99M SESTAMIBI: HCPCS | Performed by: CLINICAL NURSE SPECIALIST

## 2019-08-22 PROCEDURE — 6360000002 HC RX W HCPCS: Performed by: CLINICAL NURSE SPECIALIST

## 2019-08-22 PROCEDURE — 78452 HT MUSCLE IMAGE SPECT MULT: CPT

## 2019-08-22 RX ADMIN — REGADENOSON 0.4 MG: 0.08 INJECTION, SOLUTION INTRAVENOUS at 12:45

## 2019-08-22 RX ADMIN — TETRAKIS(2-METHOXYISOBUTYLISOCYANIDE)COPPER(I) TETRAFLUOROBORATE 10 MILLICURIE: 1 INJECTION, POWDER, LYOPHILIZED, FOR SOLUTION INTRAVENOUS at 13:57

## 2019-08-22 RX ADMIN — TETRAKIS(2-METHOXYISOBUTYLISOCYANIDE)COPPER(I) TETRAFLUOROBORATE 30 MILLICURIE: 1 INJECTION, POWDER, LYOPHILIZED, FOR SOLUTION INTRAVENOUS at 13:57

## 2019-08-23 LAB
LV EF: 80 %
LVEF MODALITY: NORMAL

## 2019-08-26 ENCOUNTER — TELEPHONE (OUTPATIENT)
Dept: CARDIOLOGY | Age: 64
End: 2019-08-26

## 2019-08-26 NOTE — TELEPHONE ENCOUNTER
Spoke with MD to schedule heart cath. Called patient she is agreeable to cath. Confirmed no allergy to contrast dye. She does have xarelto listed on med list but says she is no longer taking this medication. Referral has been placed.

## 2019-08-27 ENCOUNTER — PRIOR AUTHORIZATION (OUTPATIENT)
Dept: OBSTETRICS AND GYNECOLOGY | Facility: CLINIC | Age: 64
End: 2019-08-27

## 2019-08-29 NOTE — TELEPHONE ENCOUNTER
Spoke with patient she is scheduled for 9/3/19 arriving at [facility] for 9AM procedure

## 2019-09-03 ENCOUNTER — HOSPITAL ENCOUNTER (OUTPATIENT)
Dept: CARDIAC CATH/INVASIVE PROCEDURES | Age: 64
Discharge: HOME | End: 2019-09-03
Attending: INTERNAL MEDICINE | Admitting: INTERNAL MEDICINE
Payer: MEDICARE

## 2019-09-03 VITALS
HEIGHT: 66 IN | WEIGHT: 190 LBS | SYSTOLIC BLOOD PRESSURE: 139 MMHG | RESPIRATION RATE: 26 BRPM | BODY MASS INDEX: 30.53 KG/M2 | OXYGEN SATURATION: 98 % | DIASTOLIC BLOOD PRESSURE: 56 MMHG | HEART RATE: 82 BPM | TEMPERATURE: 97.8 F

## 2019-09-03 DIAGNOSIS — I10 ESSENTIAL HYPERTENSION: ICD-10-CM

## 2019-09-03 LAB
ANION GAP SERPL CALCULATED.3IONS-SCNC: 7 MMOL/L (ref 7–19)
BUN BLDV-MCNC: 16 MG/DL (ref 8–23)
CALCIUM SERPL-MCNC: 8.2 MG/DL (ref 8.8–10.2)
CHLORIDE BLD-SCNC: 111 MMOL/L (ref 98–111)
CO2: 24 MMOL/L (ref 22–29)
CREAT SERPL-MCNC: 0.7 MG/DL (ref 0.5–0.9)
GFR NON-AFRICAN AMERICAN: >60
GLUCOSE BLD-MCNC: 84 MG/DL (ref 74–109)
HCT VFR BLD CALC: 34.4 % (ref 37–47)
HEMOGLOBIN: 10.7 G/DL (ref 12–16)
MCH RBC QN AUTO: 30.4 PG (ref 27–31)
MCHC RBC AUTO-ENTMCNC: 31.1 G/DL (ref 33–37)
MCV RBC AUTO: 97.7 FL (ref 81–99)
PDW BLD-RTO: 15.8 % (ref 11.5–14.5)
PLATELET # BLD: 175 K/UL (ref 130–400)
PMV BLD AUTO: 10.7 FL (ref 9.4–12.3)
POTASSIUM SERPL-SCNC: 3.9 MMOL/L (ref 3.5–5)
RBC # BLD: 3.52 M/UL (ref 4.2–5.4)
SODIUM BLD-SCNC: 142 MMOL/L (ref 136–145)
WBC # BLD: 4.4 K/UL (ref 4.8–10.8)

## 2019-09-03 PROCEDURE — 93458 L HRT ARTERY/VENTRICLE ANGIO: CPT

## 2019-09-03 PROCEDURE — 80048 BASIC METABOLIC PNL TOTAL CA: CPT

## 2019-09-03 PROCEDURE — 2709999900 HC NON-CHARGEABLE SUPPLY

## 2019-09-03 PROCEDURE — 2500000003 HC RX 250 WO HCPCS

## 2019-09-03 PROCEDURE — 93458 L HRT ARTERY/VENTRICLE ANGIO: CPT | Performed by: INTERNAL MEDICINE

## 2019-09-03 PROCEDURE — 6370000000 HC RX 637 (ALT 250 FOR IP): Performed by: INTERNAL MEDICINE

## 2019-09-03 PROCEDURE — 6360000004 HC RX CONTRAST MEDICATION: Performed by: INTERNAL MEDICINE

## 2019-09-03 PROCEDURE — 2580000003 HC RX 258: Performed by: INTERNAL MEDICINE

## 2019-09-03 PROCEDURE — C1769 GUIDE WIRE: HCPCS

## 2019-09-03 PROCEDURE — 36415 COLL VENOUS BLD VENIPUNCTURE: CPT

## 2019-09-03 PROCEDURE — 6360000002 HC RX W HCPCS

## 2019-09-03 PROCEDURE — 99152 MOD SED SAME PHYS/QHP 5/>YRS: CPT | Performed by: INTERNAL MEDICINE

## 2019-09-03 PROCEDURE — 85027 COMPLETE CBC AUTOMATED: CPT

## 2019-09-03 PROCEDURE — C1894 INTRO/SHEATH, NON-LASER: HCPCS

## 2019-09-03 PROCEDURE — 99999 PR OFFICE/OUTPT VISIT,PROCEDURE ONLY: CPT | Performed by: INTERNAL MEDICINE

## 2019-09-03 PROCEDURE — 99152 MOD SED SAME PHYS/QHP 5/>YRS: CPT

## 2019-09-03 RX ORDER — SODIUM CHLORIDE 9 MG/ML
1000 INJECTION, SOLUTION INTRAVENOUS CONTINUOUS
Status: DISCONTINUED | OUTPATIENT
Start: 2019-09-03 | End: 2019-09-03 | Stop reason: HOSPADM

## 2019-09-03 RX ORDER — ONDANSETRON 2 MG/ML
4 INJECTION INTRAMUSCULAR; INTRAVENOUS EVERY 6 HOURS PRN
Status: DISCONTINUED | OUTPATIENT
Start: 2019-09-03 | End: 2019-09-03 | Stop reason: HOSPADM

## 2019-09-03 RX ORDER — HYDRALAZINE HYDROCHLORIDE 20 MG/ML
10 INJECTION INTRAMUSCULAR; INTRAVENOUS EVERY 10 MIN PRN
Status: DISCONTINUED | OUTPATIENT
Start: 2019-09-03 | End: 2019-09-03 | Stop reason: HOSPADM

## 2019-09-03 RX ORDER — SODIUM CHLORIDE 9 MG/ML
INJECTION, SOLUTION INTRAVENOUS CONTINUOUS
Status: DISCONTINUED | OUTPATIENT
Start: 2019-09-03 | End: 2019-09-03 | Stop reason: HOSPADM

## 2019-09-03 RX ORDER — SODIUM CHLORIDE 9 MG/ML
INJECTION, SOLUTION INTRAVENOUS CONTINUOUS
Status: DISCONTINUED | OUTPATIENT
Start: 2019-09-03 | End: 2019-09-03 | Stop reason: SDUPTHER

## 2019-09-03 RX ORDER — SODIUM CHLORIDE 0.9 % (FLUSH) 0.9 %
10 SYRINGE (ML) INJECTION PRN
Status: DISCONTINUED | OUTPATIENT
Start: 2019-09-03 | End: 2019-09-03 | Stop reason: HOSPADM

## 2019-09-03 RX ORDER — CEFDINIR 300 MG/1
300 CAPSULE ORAL 2 TIMES DAILY
COMMUNITY
End: 2019-10-08 | Stop reason: ALTCHOICE

## 2019-09-03 RX ORDER — SODIUM CHLORIDE 0.9 % (FLUSH) 0.9 %
10 SYRINGE (ML) INJECTION EVERY 12 HOURS SCHEDULED
Status: DISCONTINUED | OUTPATIENT
Start: 2019-09-03 | End: 2019-09-03 | Stop reason: HOSPADM

## 2019-09-03 RX ORDER — ACETAMINOPHEN 325 MG/1
650 TABLET ORAL EVERY 4 HOURS PRN
Status: DISCONTINUED | OUTPATIENT
Start: 2019-09-03 | End: 2019-09-03 | Stop reason: HOSPADM

## 2019-09-03 RX ORDER — ALPRAZOLAM 0.5 MG/1
0.5 TABLET ORAL
Status: DISCONTINUED | OUTPATIENT
Start: 2019-09-03 | End: 2019-09-03 | Stop reason: HOSPADM

## 2019-09-03 RX ORDER — ASPIRIN 81 MG/1
81 TABLET ORAL ONCE
Status: COMPLETED | OUTPATIENT
Start: 2019-09-03 | End: 2019-09-03

## 2019-09-03 RX ORDER — CARVEDILOL 12.5 MG/1
12.5 TABLET ORAL 2 TIMES DAILY
Qty: 180 TABLET | Refills: 3 | Status: SHIPPED | OUTPATIENT
Start: 2019-09-03 | End: 2020-09-03

## 2019-09-03 RX ORDER — CLOPIDOGREL BISULFATE 75 MG/1
75 TABLET ORAL DAILY
COMMUNITY
End: 2020-04-14

## 2019-09-03 RX ORDER — ONDANSETRON 2 MG/ML
4 INJECTION INTRAMUSCULAR; INTRAVENOUS EVERY 6 HOURS PRN
Status: DISCONTINUED | OUTPATIENT
Start: 2019-09-03 | End: 2019-09-03

## 2019-09-03 RX ORDER — ISOSORBIDE MONONITRATE 60 MG/1
60 TABLET, EXTENDED RELEASE ORAL 2 TIMES DAILY
Qty: 60 TABLET | Refills: 5 | Status: SHIPPED | OUTPATIENT
Start: 2019-09-03 | End: 2020-07-10

## 2019-09-03 RX ADMIN — ACETAMINOPHEN 650 MG: 325 TABLET ORAL at 13:21

## 2019-09-03 RX ADMIN — ASPIRIN 81 MG: 81 TABLET, COATED ORAL at 09:34

## 2019-09-03 RX ADMIN — SODIUM CHLORIDE: 9 INJECTION, SOLUTION INTRAVENOUS at 08:42

## 2019-09-03 RX ADMIN — IOPAMIDOL 121 ML: 612 INJECTION, SOLUTION INTRAVENOUS at 12:28

## 2019-09-03 ASSESSMENT — ENCOUNTER SYMPTOMS
GASTROINTESTINAL NEGATIVE: 1
EYES NEGATIVE: 1
SHORTNESS OF BREATH: 0
VOMITING: 0
DIARRHEA: 0
NAUSEA: 0
RESPIRATORY NEGATIVE: 1

## 2019-09-03 ASSESSMENT — PAIN SCALES - GENERAL: PAINLEVEL_OUTOF10: 10

## 2019-09-03 NOTE — H&P
[address] [facility]  History and Physical    Patient:  Apryl Roman  MRN: [id number]    CHIEF COMPLAINT:  No chief complaint on file. History Obtained From: Patient    PCP: Rosangela Price MD    HISTORY OF PRESENT ILLNESS:   75 y.o. female who presents with recent onset of chest pain the past few weeks suggestive of angina. She has had 2 previous stents in the past several years ago. I saw her earlier in the year. Stress test 8/22/2019 abnormal suggesting lateral wall ischemia now admitted for diagnostic catheterization. REVIEW OF SYSTEMS:  Review of Systems   Constitutional: Negative. Negative for chills, fever and unexpected weight change. HENT: Negative. Eyes: Negative. Respiratory: Negative. Negative for shortness of breath. Cardiovascular: Negative. Negative for chest pain. Gastrointestinal: Negative. Negative for diarrhea, nausea and vomiting. Endocrine: Negative. Genitourinary: Negative. Musculoskeletal: Negative. Skin: Negative. Neurological: Negative. All other systems reviewed and are negative. Cardiac Specific Data:   Specialty Problems        Cardiology Problems    Coronary artery disease of native artery of native heart with stable angina pectoris (HCC)        Hypercholesterolemia        Hypertension        Rheumatoid arteritis        Near syncope        Syncope, cardiogenic        Sinus node dysfunction (HCC)              Past Medical History:      Diagnosis Date    CAD (coronary artery disease)     S/p Stent to right coronary artery 9/2010. S/p myocardial infarction May 2013.     Cellulitis     LT LEG IN PAST    History of blood transfusion     History of DVT (deep vein thrombosis)     LT    History of neutropenia     History of pulmonary fibrosis     Hx of blood clots 1992    LT LEG    Hypercholesterolemia     Hypertension     Intrinsic asthma     Pacemaker 11/17/2016    Post op infection     \"right knee leaking after my replacement\"  Rheumatoid arteritis     Sinus node dysfunction (HCC)     Staph aureus infection     Status post placement of implantable loop recorder 2/13/15, 3/30/15    2/19/15  removed, infection    Syncope 2/11/2015    Thyroid disease     HYPOTHYROIDISM       Past Surgical History:      Procedure Laterality Date    BACK SURGERY  01/01/2018    CARDIAC CATHETERIZATION  5/14/14  MDL    normal LV function. EF 60%    CARDIAC CATHETERIZATION  1/26/15  MDL    EF 60%, Left circ has long 80-90% in small 2mm vessel, diffuse disease in RCA    CORONARY ANGIOPLASTY WITH STENT PLACEMENT  2014    DIAGNOSTIC CARDIAC CATH LAB PROCEDURE      Right coronary artery stent. 9/2010    JOINT REPLACEMENT      LEEP      PACEMAKER INSERTION  11/17/2016    Dr Tim Amaya; medtronic    PACEMAKER PLACEMENT      medtronic    ME INCIS/DRAIN THIGH/KNEE ABSCESS,DEEP Right 10/23/2018    KNEE INCISION AND DRAINAGE performed by Giovanny Mcnally MD at [place] [address] Right 9/11/2018    KNEE TOTAL ARTHROPLASTY performed by Giovanny Mcnally MD at [place] [address] Right 12/6/2018    KNEE EXPLANT AND PLACEMENT OF ANTIBIOTIC SPACER performed by Giovanny Mcnally MD at [facility] OR       Medications Prior to Admission:    Prior to Admission medications    Medication Sig Start Date End Date Taking?  Authorizing Provider   clopidogrel (PLAVIX) 75 MG tablet Take 75 mg by mouth daily   Yes Historical Provider, MD   cefdinir (OMNICEF) 300 MG capsule Take 300 mg by mouth 2 times daily   Yes Historical Provider, MD   nitroGLYCERIN (NITROSTAT) 0.4 MG SL tablet Place 1 tablet under the tongue every 5 minutes as needed for Chest pain 7/23/19  Yes MELISSA Aparicio   carvedilol (COREG) 12.5 MG tablet Take 1 tablet by mouth daily 7/5/19  Yes MELISSA Blankenship   isosorbide mononitrate (IMDUR) 60 MG extended release tablet Take 1 tablet by mouth daily 6/25/19  Yes MELISSA Aparicio salsalate (DISALCID) 500 MG tablet Take 2,500 mg by mouth See Admin Instructions Monday, Wednesday, Friday and Sunday   Yes Historical Provider, MD   salsalate (DISALCID) 500 MG tablet Take 2,000 mg by mouth See Admin Instructions Saturday, Tuesday and Thursday   Yes Historical Provider, MD   Multiple Vitamins-Minerals (THERAPEUTIC MULTIVITAMIN-MINERALS) tablet Take 1 tablet by mouth daily   Yes Historical Provider, MD   predniSONE (DELTASONE) 1 MG tablet Take 2 mg by mouth daily   Yes Historical Provider, MD   traMADol-acetaminophen (ULTRACET) 37.5-325 MG per tablet Take 1 tablet by mouth 2 times daily. .   Yes Historical Provider, MD   pregabalin (LYRICA) 50 MG capsule Take 1 capsule by mouth daily for 28 days. 2/2/18 9/3/19 Yes Ricardo White MD   valACYclovir (VALTREX) 500 MG tablet Take 1 tablet by mouth daily 2/2/18  Yes Ricardo White MD   pantoprazole (PROTONIX) 40 MG tablet Take 1 tablet by mouth daily 2/2/18  Yes Ricardo White MD   fluticasone [facility]) 50 MCG/ACT nasal spray 2 sprays by Nasal route daily   Yes Historical Provider, MD   spironolactone (ALDACTONE) 25 MG tablet Take 25 mg by mouth daily   Yes Historical Provider, MD   ergocalciferol (ERGOCALCIFEROL) 53664 units capsule Take 50,000 Units by mouth once a week   Yes Historical Provider, MD   cloNIDine (CATAPRES) 0.1 MG tablet Take 0.2 mg by mouth nightly  6/29/17  Yes Historical Provider, MD   levothyroxine (SYNTHROID) 75 MCG tablet Take 75 mcg by mouth Daily  5/15/14  Yes Historical Provider, MD   folic acid (FOLVITE) 1 MG tablet Take 1 mg by mouth daily.      Yes Historical Provider, MD   cetirizine (ZYRTEC) 10 MG tablet Take 10 mg by mouth daily    Yes Historical Provider, MD   ezetimibe (ZETIA) 10 MG tablet Take 10 mg by mouth daily    Yes Historical Provider, MD   Evolocumab (REPATHA SURECLICK) 671 MG/ML SOAJ Inject 1 mL into the skin every 14 days  Patient not taking: Reported on 7/23/2019 4/16/19   Kimberly Martinez MD   denosumab Physical Exam:    Vitals: BP (!) 159/74   Pulse 66   Temp 97.8 °F (36.6 °C) (Temporal)   Resp 25   Ht 5' 6\" (1.676 m)   Wt 190 lb (86.2 kg)   SpO2 97%   BMI 30.67 kg/m²   24HR INTAKE/OUTPUT:  No intake or output data in the 24 hours ending 09/03/19 1044    Physical Exam   Constitutional: She appears well-developed and well-nourished. No distress. HENT:   Head: Normocephalic and atraumatic. Eyes: Pupils are equal, round, and reactive to light. EOM are normal. No scleral icterus. Neck: Normal range of motion. Neck supple. No JVD present. Carotid bruit is not present. No tracheal deviation present. No thyromegaly present. No carotid artery bruits auscultated   Cardiovascular: Normal rate, regular rhythm and normal heart sounds. Exam reveals no gallop and no friction rub. No murmur heard. Pulmonary/Chest: Effort normal and breath sounds normal. No stridor. No respiratory distress. She has no wheezes. She has no rales. She exhibits no tenderness. Abdominal: Soft. Bowel sounds are normal. She exhibits no distension and no mass. There is no tenderness. There is no rebound and no guarding. No hernia. Musculoskeletal: She exhibits no edema. Lymphadenopathy:     She has no cervical adenopathy. Neurological: No cranial nerve deficit or sensory deficit. She exhibits normal muscle tone. Coordination normal.   Skin: Skin is warm and dry. She is not diaphoretic. Psychiatric: She has a normal mood and affect. Her behavior is normal. Judgment and thought content normal.   Vitals reviewed. LAB DATA:  CBC:   Recent Labs     09/03/19  0817   WBC 4.4*   HGB 10.7*        BMP:    Recent Labs     09/03/19  0817      K 3.9      CO2 24   BUN 16   CREATININE 0.7   GLUCOSE 84     Hepatic: No results for input(s): AST, ALT, ALB, BILITOT, ALKPHOS in the last 72 hours.   CK, CKMB, Troponin: @LABRCNT (CKTOTAL:3, CKMB:3, TROPONINI:3)@  Pro-BNP: No results for input(s): BNP in the last 72 hours. Lipids: No results for input(s): CHOL, HDL in the last 72 hours. Invalid input(s): LDL  ABGs: No results for input(s): PHART, PKD8SPV, PO2ART, ZQW3ZJN, BEART, HGBAE, V6WXLNIR, CARBOXHGBART, 02THERAPY in the last 72 hours. INR: No results for input(s): INR in the last 72 hours. A1c:Invalid input(s): HEMOGLOBIN A1C  URINALYSIS:   -----------------------------------------------------------------  IMAGING:    Nm Myocardial Spect Rest Exercise Or Rx    Result Date: 8/23/2019  Lexiscan Nuclear Stress Test Report Procedure date: 8/22/2019 Indications: Chest pain Procedure: Stress was performed with injection of 0.4 mg Lexiscan. Vital signs and EKG were monitored. Technetium-99 sestamibi was injected in divided doses, approximately 10 mCi and 30 mCi respectively for rest and stress imaging. The patient was discharged in stable condition. Results: Patient had symptoms of dyspnea and stomach cramps during infusion that resolved in recovery. Baseline EKG showed sinus rhythm. During stress there were no significant EKG changes or rhythm changes. Baseline and peak blood pressures were 153/76, and 183/81 respectively. Baseline and peak heart rates were 65 and  112 respectively. Radioactive pharmaceuticals used: Rest images 9.3 mCi technetium 99m sestamibi Stress images 30.89 mCi technetium 99m sestamibi Review of rest and stress images obtained utilizing a SPECT acquisition protocol along with review of the polar plot revealed: 1. Ejection fraction normal 80% 2. Wall motion study normal 3.  Myocardial perfusion imaging demonstrated moderately reduced uptake in the lateral wall the sum stress score was 8 the sum difference score was a total perfusion defect on stress was 11% total perfusion defect on rest was 2% Summary impressions: Abnormal study with normal ejection fraction 80% suggestion of moderate lateral wall ischemia correlate clinically and/or angiographically if symptomatic and otherwise appropriate Signed by Dr Karan Will on 8/22/2019 5:08 PM        Assessment:    1. Recent onset of chest pain and dyspnea the last few weeks  2. Abnormal stress test 8/22/2019 ejection fraction 80% upon suggestion lateral wall ischemia  3. Lewis artery disease  4. 2 previous stents placed  5. Hypertension  6. Rheumatoid arthritis  7. Pulmonary fibrosis  8. History of DVT  9. Asthma  10. Hyperlipidemia  11. History of syncope  12. History of implantable loop recorder  13. Left carotid bruit  14. Pacemaker  15. Sinus node dysfunction  16. Lumbar stenosis  17. Osteoarthritis      Recommendations:    1. Recommend proceeding with diagnostic catheterization patient agreeable advised of risk benefits alternatives etc.  I have discussed with the patient regarding indications for the proposed procedure LEFT HEART CATHETERIZATION AND POSSIBLE PERCUTANEOUS INTERVENTION  along with possible alternatives benefits and risks including but not limited to risks of death, myocardial infarction, stroke, contrast induced nephropathy which in some cases may lead to acute kidney failure requiring dialysis, allergic reactions, bleeding requiring blood transfusion,  cardiac arrhthymias, respiratory failure which may require placing the patient on respiratory support such as a ventilator or breathing machine,risk of complications which may require vascular surgery, and if coronary intervention is performed emergency CABG may be required in less than 1% of cases. The patient is awake and alert and understands the issues involved and indicates willingness to proceed as ordered. The patient does not have any contraindications to dual antiplatelet therapy. The patient does not have any known  pending surgical procedures in the next 12 months at this time. The patient is  a reasonable candidate for moderate conscious sedation.     ASA score:  ASA 3 - Patient with moderate systemic disease with functional limitations    Mallampati: I (soft palate,

## 2019-09-27 RX ORDER — VALACYCLOVIR HYDROCHLORIDE 500 MG/1
500 TABLET, FILM COATED ORAL DAILY
Qty: 30 TABLET | Refills: 0 | Status: SHIPPED | OUTPATIENT
Start: 2019-09-27 | End: 2019-10-28 | Stop reason: SDUPTHER

## 2019-10-08 ENCOUNTER — OFFICE VISIT (OUTPATIENT)
Dept: CARDIOLOGY | Age: 64
End: 2019-10-08
Payer: MEDICARE

## 2019-10-08 VITALS
SYSTOLIC BLOOD PRESSURE: 130 MMHG | HEART RATE: 68 BPM | HEIGHT: 66 IN | DIASTOLIC BLOOD PRESSURE: 76 MMHG | WEIGHT: 205 LBS | BODY MASS INDEX: 32.95 KG/M2

## 2019-10-08 DIAGNOSIS — I10 ESSENTIAL HYPERTENSION: ICD-10-CM

## 2019-10-08 DIAGNOSIS — E78.00 HYPERCHOLESTEROLEMIA: ICD-10-CM

## 2019-10-08 DIAGNOSIS — M79.10 MYALGIA DUE TO STATIN: ICD-10-CM

## 2019-10-08 DIAGNOSIS — I49.5 SINUS NODE DYSFUNCTION (HCC): ICD-10-CM

## 2019-10-08 DIAGNOSIS — T46.6X5A MYALGIA DUE TO STATIN: ICD-10-CM

## 2019-10-08 DIAGNOSIS — Z95.0 PACEMAKER: ICD-10-CM

## 2019-10-08 DIAGNOSIS — R07.89 CHEST DISCOMFORT: ICD-10-CM

## 2019-10-08 DIAGNOSIS — I25.118 CORONARY ARTERY DISEASE OF NATIVE ARTERY OF NATIVE HEART WITH STABLE ANGINA PECTORIS (HCC): Primary | ICD-10-CM

## 2019-10-08 PROCEDURE — G8484 FLU IMMUNIZE NO ADMIN: HCPCS | Performed by: CLINICAL NURSE SPECIALIST

## 2019-10-08 PROCEDURE — G8598 ASA/ANTIPLAT THER USED: HCPCS | Performed by: CLINICAL NURSE SPECIALIST

## 2019-10-08 PROCEDURE — 93280 PM DEVICE PROGR EVAL DUAL: CPT | Performed by: CLINICAL NURSE SPECIALIST

## 2019-10-08 PROCEDURE — 1036F TOBACCO NON-USER: CPT | Performed by: CLINICAL NURSE SPECIALIST

## 2019-10-08 PROCEDURE — 1090F PRES/ABSN URINE INCON ASSESS: CPT | Performed by: CLINICAL NURSE SPECIALIST

## 2019-10-08 PROCEDURE — 1123F ACP DISCUSS/DSCN MKR DOCD: CPT | Performed by: CLINICAL NURSE SPECIALIST

## 2019-10-08 PROCEDURE — G8400 PT W/DXA NO RESULTS DOC: HCPCS | Performed by: CLINICAL NURSE SPECIALIST

## 2019-10-08 PROCEDURE — G8417 CALC BMI ABV UP PARAM F/U: HCPCS | Performed by: CLINICAL NURSE SPECIALIST

## 2019-10-08 PROCEDURE — G8427 DOCREV CUR MEDS BY ELIG CLIN: HCPCS | Performed by: CLINICAL NURSE SPECIALIST

## 2019-10-08 PROCEDURE — 3017F COLORECTAL CA SCREEN DOC REV: CPT | Performed by: CLINICAL NURSE SPECIALIST

## 2019-10-08 PROCEDURE — 99213 OFFICE O/P EST LOW 20 MIN: CPT | Performed by: CLINICAL NURSE SPECIALIST

## 2019-10-08 PROCEDURE — 4040F PNEUMOC VAC/ADMIN/RCVD: CPT | Performed by: CLINICAL NURSE SPECIALIST

## 2019-10-08 ASSESSMENT — ENCOUNTER SYMPTOMS
COUGH: 0
CHEST TIGHTNESS: 1
SHORTNESS OF BREATH: 0
WHEEZING: 0
ABDOMINAL PAIN: 0
VOMITING: 0
FACIAL SWELLING: 0
NAUSEA: 0
EYE REDNESS: 0

## 2019-10-15 ENCOUNTER — CLINICAL SUPPORT (OUTPATIENT)
Dept: OBSTETRICS AND GYNECOLOGY | Facility: CLINIC | Age: 64
End: 2019-10-15

## 2019-10-15 DIAGNOSIS — M81.0 OSTEOPOROSIS, UNSPECIFIED OSTEOPOROSIS TYPE, UNSPECIFIED PATHOLOGICAL FRACTURE PRESENCE: Primary | ICD-10-CM

## 2019-10-28 ENCOUNTER — TELEPHONE (OUTPATIENT)
Dept: OBSTETRICS AND GYNECOLOGY | Facility: CLINIC | Age: 64
End: 2019-10-28

## 2019-10-28 RX ORDER — VALACYCLOVIR HYDROCHLORIDE 500 MG/1
500 TABLET, FILM COATED ORAL DAILY
Qty: 30 TABLET | Refills: 0 | Status: ON HOLD | OUTPATIENT
Start: 2019-10-28 | End: 2021-02-11

## 2019-10-30 RX ORDER — SALSALATE 500 MG
TABLET ORAL
Qty: 128 TABLET | Refills: 5 | Status: SHIPPED | OUTPATIENT
Start: 2019-10-30 | End: 2020-11-17

## 2019-11-07 ENCOUNTER — TRANSCRIBE ORDERS (OUTPATIENT)
Dept: ADMINISTRATIVE | Facility: HOSPITAL | Age: 64
End: 2019-11-07

## 2019-11-07 ENCOUNTER — HOSPITAL ENCOUNTER (OUTPATIENT)
Dept: GENERAL RADIOLOGY | Facility: HOSPITAL | Age: 64
Discharge: HOME | End: 2019-11-07
Admitting: INTERNAL MEDICINE

## 2019-11-07 DIAGNOSIS — M54.50 LOW BACK PAIN, UNSPECIFIED BACK PAIN LATERALITY, UNSPECIFIED CHRONICITY, UNSPECIFIED WHETHER SCIATICA PRESENT: Primary | ICD-10-CM

## 2019-11-07 DIAGNOSIS — M54.50 LOW BACK PAIN, UNSPECIFIED BACK PAIN LATERALITY, UNSPECIFIED CHRONICITY, UNSPECIFIED WHETHER SCIATICA PRESENT: ICD-10-CM

## 2019-11-07 PROCEDURE — 72110 X-RAY EXAM L-2 SPINE 4/>VWS: CPT

## 2019-11-08 RX ORDER — VALACYCLOVIR HYDROCHLORIDE 500 MG/1
500 TABLET, FILM COATED ORAL DAILY
Qty: 30 TABLET | Refills: 0 | OUTPATIENT
Start: 2019-11-08

## 2019-11-27 RX ORDER — VALACYCLOVIR HYDROCHLORIDE 500 MG/1
500 TABLET, FILM COATED ORAL DAILY
Qty: 30 TABLET | Refills: 0 | Status: SHIPPED | OUTPATIENT
Start: 2019-11-27 | End: 2019-12-27

## 2019-12-11 ENCOUNTER — OFFICE VISIT (OUTPATIENT)
Dept: OTOLARYNGOLOGY | Facility: CLINIC | Age: 64
End: 2019-12-11

## 2019-12-11 VITALS
TEMPERATURE: 96.8 F | HEART RATE: 82 BPM | WEIGHT: 209 LBS | HEIGHT: 66 IN | DIASTOLIC BLOOD PRESSURE: 62 MMHG | SYSTOLIC BLOOD PRESSURE: 129 MMHG | RESPIRATION RATE: 16 BRPM | BODY MASS INDEX: 33.59 KG/M2

## 2019-12-11 DIAGNOSIS — H72.93: ICD-10-CM

## 2019-12-11 DIAGNOSIS — H69.81 DYSFUNCTION OF RIGHT EUSTACHIAN TUBE: Primary | ICD-10-CM

## 2019-12-11 DIAGNOSIS — Z97.4 PRESENCE OF EXTERNAL HEARING AID: ICD-10-CM

## 2019-12-11 DIAGNOSIS — IMO0001 ASYMMETRICAL HEARING LOSS OF RIGHT EAR: ICD-10-CM

## 2019-12-11 DIAGNOSIS — H90.3 SENSORINEURAL HEARING LOSS (SNHL) OF BOTH EARS: ICD-10-CM

## 2019-12-11 PROCEDURE — 99214 OFFICE O/P EST MOD 30 MIN: CPT | Performed by: OTOLARYNGOLOGY

## 2019-12-11 RX ORDER — OXYMETAZOLINE HYDROCHLORIDE 0.05 G/100ML
2 SPRAY NASAL 3 TIMES DAILY
Qty: 2 ML | Refills: 0 | Status: SHIPPED | OUTPATIENT
Start: 2019-12-11 | End: 2019-12-14

## 2019-12-11 RX ORDER — FLUTICASONE PROPIONATE 50 MCG
2 SPRAY, SUSPENSION (ML) NASAL 2 TIMES DAILY
Qty: 48 G | Refills: 3 | Status: SHIPPED | OUTPATIENT
Start: 2019-12-11 | End: 2020-08-12

## 2019-12-11 RX ORDER — OXYMETAZOLINE HYDROCHLORIDE 0.05 G/100ML
2 SPRAY NASAL 3 TIMES DAILY
Qty: 2 ML | Refills: 0 | Status: SHIPPED | OUTPATIENT
Start: 2019-12-11 | End: 2019-12-11 | Stop reason: SDUPTHER

## 2019-12-11 RX ORDER — FLUTICASONE PROPIONATE 50 MCG
2 SPRAY, SUSPENSION (ML) NASAL 2 TIMES DAILY
Qty: 48 G | Refills: 3 | Status: SHIPPED | OUTPATIENT
Start: 2019-12-11 | End: 2019-12-11 | Stop reason: SDUPTHER

## 2019-12-11 NOTE — PROGRESS NOTES
Elie Harvey Jr, MD     FOLLOW UP NOTE     Chief Complaint   Patient presents with   • Ear Problem        HISTORY OF PRESENT ILLNESS:   Accompanied by:  No one  Tawny Shin is a  64 y.o. female who is here for follow up. She says she had ear infection.  PCP has been telling her R ear infection.  No pain now. She did have some pain and dizziness.  She has had antibiotics and steroids by PCP.  Dr Holman placed tubes several yrs ago. Tubes AU. She has tube extruded in 6-8 mos.  She says this occurred in 9/2019. She has associated symptoms of feeling bad. Wa unable to leave house.  No flu testing or chest X ray done but diagnosis with pneumonia by PCP.  She had hearing aid check. She is wearing AU.  She has issues with multiple voices and High pitched ladies voice.  Dizziness- resolved now.  Ringing- She hears high freq buzz, She yenifer sleep with TV on. AU buzzing. Better with Hearng aids in place.  Smoke- none  Drink- none    Review of Systems  Reviewed per patient intake note and confirmed by me    Past History:  Past medical and surgical history, family history and social history reviewed and updated when appropriate.  Current medications and allergies reviewed and updated when appropriate.  Allergies:  Atorvastatin; Amoxicillin; Nabumetone; Niacin er; Penicillins; and Simvastatin        Vital Signs:   Temp:  [96.8 °F (36 °C)] 96.8 °F (36 °C)  Heart Rate:  [82] 82  Resp:  [16] 16  BP: (129)/(62) 129/62    EXAMINATION:   CONSTITUTIONAL:    well nourished, well-developed, alert, oriented, in no acute distress     BODY HABITUS:    Normal body habitus    COMMUNICATION:    able to communicate normally, normal voice quality    HEAD:     Normocephalic, without obvious abnormality, atraumatic    FACE:    structure normal, no tenderness present, no lesions/masses, no evidence of trauma    SALIVARY GLANDS:    parotid glands with no tenderness, no swelling, no masses, submandibular glands with normal size, nontender      EYE:    ocular motility normal, eyelids normal, orbits normal, no proptosis, conjunctiva clear, sclera non-icteric, pupils equal, round, reactive to light and accomodation  Color:   blue    HEARING:      response to conversational voice normal with hearing aids    EARS:    Otoscopic exam    EXTERNAL AUDITORY CANALS:     normal ear canals without stenosis or significant cerumen, skin intact and normal shape   TYMPANIC MEMBRANES:     Abnormal tympanic membrane:        RIGHT: monomer posterior inferior, intact TM  LEFT: monomer poster inferior, TM intact   MIDDLE EAR:     Middle clear, no fluid, Ossicular chain intact   MASTOID:     Non-surgical, Non-tender    PINNA:     normal, non-tender, skin intact without lesions      NOSE EXTERNAL:    APPEARANCE: normal, straight, with good projection, no tenderness, no lesions, no tenderness, good nasal support, patent nares    NOSE INTERNAL:    Anterior rhinoscopy   NASALMUCOSA:    abnormal:        Bilateral- bluish, boggy    NASAL PASSAGES:     abnormal   Bilateral- narrowed    NASAL VALVE:     abnormal  Bilateral- mildly weak    SEPTUM:     mucosa abnormal   Bilateral- bluish, boggy     midline   INFERIOR TURBINATES:     abnormal  Bilateral- bluish, boggy, enlarged     ORAL CAVITY:    Normal lips with no lesions, dentition normal for age, FOM intact without lesions and normal salivary flow, Mucosa intact without lesions, Hard and soft palate normal without lesions    OROPHARYNX:    Direct examination  oropharyngeal mucosa normal, tonsil(s) with normal appearance      NECK:    normal appearance, no masses, no lesions, larynx normal mobility, trachea midline   Neck position: lordotic    LYMPH NODES :    no adenopathy    THYROID:    no overt thyromegaly, no tenderness, nodules or mass present on palpation, position midline    CHEST/RESPIRATORY:    respiratory effort normal, no rales, rubs or wheezing, no stridor, normal appearance to chest    CARDIOVASCULAR:    regular rate  and rhythm, no murmurs, gallups, no peripheral edema    NEURO/PSYCHIATRIC :    oriented appropriately for age, mood normal, affect appropriate, cranial nerves intact grossly (unless specifically described), gait normal for age    RESULTS REVIEW:    Audiologic testing reviewed. From [facility]          ASSESSMENT:      Diagnosis Plan   1. Dysfunction of right eustachian tube      Partially treated   2. Sensorineural hearing loss (SNHL) of both ears      reviewed audio  Bilateral SNHL   3. Asymmetrical hearing loss of right ear      AD slightly lower than R side   4. Presence of external hearing aid      AU   5. Monomeric tympanic membrane of both ears      Healed prior myringotomy sites  Intact TM           PLAN:   Conservative management.  Patient has no ear fluid. She has monomer AU.  She appears to have ETD.  I will treat for ETD.  I will refer to [facility] for Hearing aid support.  Afrin x 3 days  Flonase BID  Nasal saline  Pop ears  Ear care with oil  MY CHART:  Patient is Encouraged to enroll in My Chart  Encouraged to review data and findings in My Chart          Patient understand(s) and agree(s) with the treatment plan as described.    Return in about 6 weeks (around 1/22/2020) for Recheck R ETD and hearing.     Elie Harvey Jr, MD  12/11/19  10:31 AM

## 2019-12-11 NOTE — PATIENT INSTRUCTIONS
NASAL SALINE:  Use 2 puffs each nostril 4-6 times daily and more frequently if possible.  You can buy saline spray or you can make your own and use an old spray bottle to administer  Use a humidifier at bedside  Recipe for saline:d  Water                                 1 quart  Salt (table)                        1 tablespoon  Gylcerin (or Bruna Syrup)    1 teaspoon  Sodium bicarbonate           1 teaspoon  Sprays or [place] pots are recommended    Nasal steroid use:  Using nasal steroids:  You will be prescribed one of the following nasal steroids: Flonase, Nasacort, Nasonex, Rhinocort, Qnasl, Zetonna  2 puffs each nostril 2 times daily  Start as soon as possible  If you are using Afrin for 3 days with the nasal steroid,  Use Afrin first and wait 10 minutes to allow the nose to open. Then administer nasal steroids.    Afrin use:  Use 2 puffs each nostril 3 times daily for 3 days only!!  Stop using after 3 days unless instructed to use longer    EAR CARE: :using oil  Use a hair dryer on low heat and blow into the ear canals 2 times daily to keep ears dry.  DO Not use Q tips or Mercedes pins, ever!!  Do not use alcohol in the ear canal (this will cause dryness and itching)  NO peroxide or alcohol in ears  Oil: Use Sweet oil, Olive oil, or Mineral oil, purchased over the counter, Once a month, Do not use Q tips, You may use a hair dryer on low heat. Blow in ears for 10-15 seconds 2 times daily to dry ear canals or if ear canals are itching.    How to pop ears:  Pop your ears by holding nose and closing mouth, then blow hard until ears pop  Children (less than 8-9 years)- blow up ballons 4 times a day and more frequently    Thank you for enrolling in [employer]. Please follow the instructions below to securely access your online medical record. [employer] allows you to send messages to your doctor, view your test results, renew your prescriptions, schedule appointments, and more.    How Do I Sign Up?  1. In your Internet  browser, go to [email or web address]  2. Click on the Sign Up Now link in the New User? box.   3. Enter your [employer] Activation Code exactly as it appears below. You will not need to use this code after you have completed the sign-up process. If you do not sign up before the expiration date, you must request a new code.  [employer] Activation Code: [email or web address]  Expires: 1/10/2020  9:44 AM    4. Enter the last four digits of your Social Security Number and your Date of Birth as indicated and click Next. You will be taken to the next sign-up page.  5. Create a Eloqua username. Think of one that is secure and easy to remember.  6. Create a Eloqua password. You can change your password at any time.  7. Choose a security question, enter your answer, and click Next. This can be used to access [employer] if you forget your password.   8. Select your communication preference. Enter a valid e-mail address if you would like to receive e-mail notifications when new information is available in [employer].  9. Click Sign In. You can now view your medical record.     Additional Information  If you have questions, you can [email or web address] [email or web address] or call [phone] to talk to our [employer] staff. Remember, [employer] is NOT to be used for urgent needs. For medical emergencies, dial 911.

## 2019-12-11 NOTE — PROGRESS NOTES
Ximena Akhtar RN   Patient Intake Note    Review of Systems  Review of Systems   Constitutional: Negative.    HENT:        See hpi   Eyes: Positive for itching.   Respiratory: Negative.    Cardiovascular: Negative.    Gastrointestinal: Negative.    Endocrine: Negative.    Genitourinary: Negative.    Musculoskeletal: Positive for back pain.   Skin: Negative.    Allergic/Immunologic: Positive for environmental allergies.   Neurological: Positive for dizziness.   Hematological: Negative.    Psychiatric/Behavioral: Negative.        Tobacco Use: Screening and Cessation Intervention  Social History    Tobacco Use      Smoking status: Never Smoker      Smokeless tobacco: Never Used        Xmiena Akhtar RN  12/11/2019  9:57 AM

## 2019-12-27 ENCOUNTER — OFFICE VISIT (OUTPATIENT)
Dept: CARDIOLOGY | Facility: CLINIC | Age: 64
End: 2019-12-27

## 2019-12-27 ENCOUNTER — CLINICAL SUPPORT NO REQUIREMENTS (OUTPATIENT)
Dept: CARDIOLOGY | Facility: CLINIC | Age: 64
End: 2019-12-27

## 2019-12-27 VITALS
BODY MASS INDEX: 33.27 KG/M2 | HEART RATE: 79 BPM | HEIGHT: 66 IN | DIASTOLIC BLOOD PRESSURE: 82 MMHG | SYSTOLIC BLOOD PRESSURE: 160 MMHG | WEIGHT: 207 LBS

## 2019-12-27 DIAGNOSIS — I10 ESSENTIAL HYPERTENSION: ICD-10-CM

## 2019-12-27 DIAGNOSIS — E78.2 MIXED HYPERLIPIDEMIA: ICD-10-CM

## 2019-12-27 DIAGNOSIS — I25.10 CORONARY ARTERY DISEASE INVOLVING NATIVE CORONARY ARTERY OF NATIVE HEART WITHOUT ANGINA PECTORIS: Primary | ICD-10-CM

## 2019-12-27 DIAGNOSIS — I49.5 SICK SINUS SYNDROME (HCC): ICD-10-CM

## 2019-12-27 DIAGNOSIS — Z95.0 PACEMAKER: ICD-10-CM

## 2019-12-27 DIAGNOSIS — Z95.0 PACEMAKER: Primary | ICD-10-CM

## 2019-12-27 PROBLEM — M05.20 RHEUMATOID ARTERITIS (HCC): Status: ACTIVE | Noted: 2019-12-27

## 2019-12-27 PROBLEM — M79.10 MYALGIA DUE TO STATIN: Status: ACTIVE | Noted: 2019-06-25

## 2019-12-27 PROBLEM — E78.5 HYPERLIPIDEMIA: Status: ACTIVE | Noted: 2019-12-27

## 2019-12-27 PROBLEM — T46.6X5A MYALGIA DUE TO STATIN: Status: ACTIVE | Noted: 2019-06-25

## 2019-12-27 PROCEDURE — 99204 OFFICE O/P NEW MOD 45 MIN: CPT | Performed by: INTERNAL MEDICINE

## 2019-12-27 PROCEDURE — 93000 ELECTROCARDIOGRAM COMPLETE: CPT | Performed by: INTERNAL MEDICINE

## 2019-12-27 PROCEDURE — 93288 INTERROG EVL PM/LDLS PM IP: CPT | Performed by: INTERNAL MEDICINE

## 2019-12-27 RX ORDER — LEVOTHYROXINE SODIUM 0.03 MG/1
25 TABLET ORAL DAILY
COMMUNITY
End: 2021-01-07

## 2019-12-27 RX ORDER — ASPIRIN 81 MG/1
81 TABLET ORAL DAILY
COMMUNITY
End: 2021-03-06 | Stop reason: HOSPADM

## 2019-12-27 RX ORDER — LEFLUNOMIDE 20 MG/1
20 TABLET ORAL DAILY
Status: ON HOLD | COMMUNITY
End: 2021-02-11

## 2019-12-27 RX ORDER — ISOSORBIDE MONONITRATE 60 MG/1
60 TABLET, EXTENDED RELEASE ORAL DAILY
COMMUNITY
End: 2020-04-10 | Stop reason: SDUPTHER

## 2019-12-27 RX ORDER — PREDNISONE 1 MG/1
1 TABLET ORAL DAILY
COMMUNITY
End: 2020-04-03

## 2019-12-27 NOTE — PROGRESS NOTES
[facility] - CARDIOLOGY  New Patient Initial Outpatient Eva[facility]    Primary Care Physician: Davon Urrutia MD    Subjective     Chief Complaint: transfer care    History of Present Illness    63 yo with known hx of CAD, hyperlipidemia, RA, and HTN who has been followed at [facility] for several years, but now seeks to transfer her care to me.  Fortunately, she has no complaints today. Pertinent history and relevant updates on chronic issues as per below:    CAD - now stable (was having chest pain, but it resolved after prior providers increased Imdudr to 60mg daily).  History includes RCA PCI in '10, followed by NSTEMI requiring LAD PCI in '13.  Most recent cath, done 9/3/19 by Dr. Melchor, reported patent stents and no other obstructive CAD.  Currently taking ASA and Brilinta 90 bid.    HTN - generally well controlled on BB, aldactone, and Imdur.    HL - previously intolerant to statin and has just been on Zetia. Recently approved for financial assistance and is starting Repatha this week.    Hx of syncope (vasovagal per records) - s/p PPM implant in '16. Doing well. No syncope since PPM implant.    Review of Systems   Constitution: Positive for malaise/fatigue.   HENT: Negative.  Negative for nosebleeds.    Eyes: Negative.    Cardiovascular: Positive for leg swelling. Negative for chest pain, claudication, dyspnea on exertion, irregular heartbeat, near-syncope, orthopnea, palpitations, paroxysmal nocturnal dyspnea and syncope.   Respiratory: Negative.  Negative for cough, shortness of breath and wheezing.    Endocrine: Negative.    Hematologic/Lymphatic: Negative.  Negative for bleeding problem. Does not bruise/bleed easily.   Skin: Negative.    Musculoskeletal: Negative.    Gastrointestinal: Negative.  Negative for dysphagia, hematemesis, hematochezia and melena.   Genitourinary: Negative.  Negative for hematuria and non-menstrual bleeding.   Neurological: Negative.    Psychiatric/Behavioral: Negative.     Allergic/Immunologic: Negative.         Otherwise complete ROS reviewed and negative except as mentioned in the HPI.      Past Medical History:   Past Medical History:   Diagnosis Date   • Arthritis    • Asthma    • Chronic sinusitis    • Coronary artery disease    • Eustachian tube dysfunction    • Heart disease    • Herpes simplex    • Hyperlipidemia    • Hypertension    • Laryngitis sicca    • Laryngitis, chronic    • MI (myocardial infarction) (CMS/HCC)    • Otorrhea    • Sensorineural hearing loss    • Sjogren's disease (CMS/HCC)        Past Surgical History:  Past Surgical History:   Procedure Laterality Date   • A-V CARDIAC PACEMAKER INSERTION  2016   • ATRIAL CARDIAC PACEMAKER INSERTION     • CARDIAC CATHETERIZATION     • CORONARY ANGIOPLASTY WITH STENT PLACEMENT      X 2; 2013 & 2014   • LUMBAR FUSION N/A 1/19/2018    Procedure: L3-4,L4-5 DECOMPRESSION, POSTERIOR SPINAL FUSION WITH INSTRUMENTATION;  Surgeon: Fortino Oropeza MD;  Location: [facility] OR;  Service:    • LUMBAR LAMINECTOMY WITH FUSION Left 1/17/2018    Procedure: LEFT L3-4 L4-5 LATERAL LUMBAR INTERBODY FUSION;  Surgeon: Fortino Oropeza MD;  Location: [facility] OR;  Service:    • MYRINGOTOMY W/ TUBES  09/04/2014    TUBES NO LONGER IN PLACE   • OTHER SURGICAL HISTORY      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 so hardware was removed and spacers were placed   • REPLACEMENT TOTAL KNEE Right        Family History: family history includes Cancer in her mother; Heart disease in her father.    Social History:  reports that she has never smoked. She has never used smokeless tobacco. Drug use questions deferred to the physician. She reports that she does not drink alcohol.    Medications:  Prior to Admission medications    Medication Sig Start Date End Date Taking? Authorizing Provider   carvedilol (COREG) 12.5 MG tablet Take 12.5 mg by mouth 2 (Two) Times a Day With Meals.    Provider, MD Leila   cetirizine (zyrTEC) 10 MG tablet Take 10 mg by mouth  daily     Leila Daly MD   CloNIDine (CATAPRES) 0.1 MG tablet Take 0.2 mg by mouth Every Night.    Leila Daly MD   conjugated estrogens (PREMARIN) 0.625 MG/GM vaginal cream Insert  into the vagina 2 (Two) Times a Week. 1/2 gram pv twice weekly at bedtime 3/14/19   Candelaria David MD   fluticasone (FLONASE) 50 MCG/ACT nasal spray 2 sprays into the nostril(s) as directed by provider 2 (Two) Times a Day. 12/11/19   Elie Harvey Jr., MD   folic acid (FOLVITE) 1 MG tablet Take 1 mg by mouth daily.      Leila Daly MD   furosemide (LASIX) 40 MG tablet Take 40 mg by mouth daily.    Leila Daly MD   HUMIRA PEN 40 MG/0.8ML Pen-injector Kit Inject 40 mg under the skin Take As Directed. EVERY 10 DAYS 11/30/16   Leila Daly MD   levothyroxine (SYNTHROID, LEVOTHROID) 75 MCG tablet Take 75 mcg by mouth daily. 5/15/14   Leila Daly MD   LYRICA 50 MG capsule Take 50 mg by mouth Daily. 10/20/16   Leila Daly MD   MULTIPLE VITAMIN PO Take 1 tablet by mouth daily.      Leila Daly MD   nitroglycerin (NITROSTAT) 0.4 MG SL tablet Place 1 tablet under the tongue every 5 minutes as needed for Chest pain 3/9/16   Leila Daly MD   pantoprazole (PROTONIX) 40 MG EC tablet Take 40 mg by mouth Daily. 10/24/16   Leila Daly MD   PROLIA 60 MG/ML solution syringe INJECT 60MG SUB-Q EVERY 6 MONTHS 5/26/17   Candelaria David MD   salsalate (DISALCID) 500 MG tablet Take 5 tablets on Monday, Wednesday, Friday, Saturday. Take 4 tablets on the other days 10/30/19   Fermin Gonzalez PA   spironolactone (ALDACTONE) 25 MG tablet Take 25 mg by mouth Daily.    Leila Daly MD   sulfamethoxazole-trimethoprim (BACTRIM DS,SEPTRA DS) 800-160 MG per tablet TK 1 T PO Q 12 H 1/9/19   Leila Daly MD   ticagrelor (BRILINTA) 60 MG tablet tablet Take 60 mg by mouth 2 (Two) Times a Day.    Provider, MD Leila   valACYclovir  "(VALTREX) 500 MG tablet Take 1 tablet by mouth Daily. 10/28/19   Candelaria David MD   valACYclovir (VALTREX) 500 MG tablet TAKE 1 TABLET BY MOUTH DAILY 11/27/19   Candelaria David MD   vitamin D (ERGOCALCIFEROL) 92822 units capsule capsule Take 50,000 Units by mouth Every 7 (Seven) Days.    Provider, MD Leila   ZETIA 10 MG tablet Take 10 mg by mouth Daily. 12/7/16   Provider, MD Leila     Allergies:  Allergies   Allergen Reactions   • Atorvastatin Other (See Comments)     LEG CRAMPS     • Amoxicillin Rash   • Nabumetone Rash   • Niacin Er Rash   • Penicillins Rash   • Simvastatin Rash       Objective     Vital Signs: /82 (BP Location: Right arm, Patient Position: Sitting)   Pulse 79   Ht 167.6 cm (66\")   Wt 93.9 kg (207 lb)   BMI 33.41 kg/m²     Physical Exam   Constitutional: No distress.   HENT:   Mouth/Throat: Oropharynx is clear. Pharynx is normal.   Neck: Normal range of motion and thyroid normal. Neck supple. No JVD present. No thyromegaly present.   Cardiovascular: Normal rate, regular rhythm, S1 normal, S2 normal, normal heart sounds, intact distal pulses and normal pulses.  No extrasystoles are present. PMI is not displaced.   Pulmonary/Chest: Effort normal and breath sounds normal.   Abdominal: Soft. Bowel sounds are normal. She exhibits no distension. There is no splenomegaly or hepatomegaly. There is no tenderness.   Musculoskeletal: She exhibits no edema or tenderness.   +RLE in brace   Neurological: She is alert and oriented to person, place, and time.   Skin: Skin is warm and dry.       Results Reviewed:      ECG 12 Lead  Date/Time: 12/27/2019 8:16 AM  Performed by: Omar Hernandez MD  Authorized by: Omar Hernandez MD   Comparison: not compared with previous ECG   Previous ECG: no previous ECG available  Rhythm comments: +electronic atrial pacemaker  Rate: normal  BPM: 79  QRS axis: left  Other findings: poor R wave progression    Clinical impression: abnormal " EKG              Lab Results   Component Value Date    CHOL 214 (H) 07/05/2019    TRIG 187 (H) 07/05/2019    HDL 47 (L) 07/05/2019    VLDL 18.2 04/02/2019    LDLHDL 2.76 07/05/2019        LDL          131            07/05/2019  No results found for: HGBA1C    Assessment / Plan        Problem List Items Addressed This Visit (all new to me, stable)       Cardiovascular and Mediastinum    Coronary artery disease - Primary    Overview     PCI RCA '10, NSTEMI requiring LAD PCI in '13    [facility] 9/3/19 at [facility]: no significant dz; stents patent         Relevant Medications    isosorbide mononitrate (IMDUR) 60 MG 24 hr tablet    ticagrelor (BRILINTA) 90 MG tablet tablet    Hyperlipidemia    Relevant Medications    Evolocumab (REPATHA) 140 MG/ML solution prefilled syringe    Hypertension    Pacemaker    Sick sinus syndrome (CMS/HCC)    Relevant Medications    isosorbide mononitrate (IMDUR) 60 MG 24 hr tablet    ticagrelor (BRILINTA) 90 MG tablet tablet        Recommendations/plans:  Continue dapt until she runs out of brilinta; at that point can resume plavix 75mg daily in addition to asa 81mg daily  Start repatha as approved (this week); can likely be taken off Zetia once PCSK9 inhibitor has proven effect on LDL-lowering  Continue other meds  Pacer check today  Obtain disc with images from last cath at [facility] (Sept '19)    F/u 6 months, or sooner if chest pain worsens or other new symptoms develop    Omar Hernandez MD   12/27/19   9:38 PM

## 2019-12-31 ENCOUNTER — TELEPHONE (OUTPATIENT)
Dept: CARDIOLOGY | Facility: CLINIC | Age: 64
End: 2019-12-31

## 2020-01-03 ENCOUNTER — DOCUMENTATION (OUTPATIENT)
Dept: CARDIOLOGY | Facility: CLINIC | Age: 65
End: 2020-01-03

## 2020-01-14 ENCOUNTER — TELEPHONE (OUTPATIENT)
Dept: CARDIOLOGY | Age: 65
End: 2020-01-14

## 2020-01-14 NOTE — TELEPHONE ENCOUNTER
Spoke with pt; Pt stated that she has transferred her care to Dr. Pattie John at [facility] and they had performed her pacemaker reading at his office.

## 2020-01-22 ENCOUNTER — OFFICE VISIT (OUTPATIENT)
Dept: OTOLARYNGOLOGY | Facility: CLINIC | Age: 65
End: 2020-01-22

## 2020-01-22 VITALS
TEMPERATURE: 97.4 F | BODY MASS INDEX: 32.47 KG/M2 | SYSTOLIC BLOOD PRESSURE: 106 MMHG | HEART RATE: 82 BPM | DIASTOLIC BLOOD PRESSURE: 62 MMHG | WEIGHT: 202 LBS | HEIGHT: 66 IN

## 2020-01-22 DIAGNOSIS — H69.81 DYSFUNCTION OF RIGHT EUSTACHIAN TUBE: Primary | ICD-10-CM

## 2020-01-22 DIAGNOSIS — K21.9 LARYNGOPHARYNGEAL REFLUX (LPR): ICD-10-CM

## 2020-01-22 DIAGNOSIS — R49.0 DYSPHONIA: ICD-10-CM

## 2020-01-22 DIAGNOSIS — Z97.4 PRESENCE OF EXTERNAL HEARING AID: ICD-10-CM

## 2020-01-22 DIAGNOSIS — K21.9 GERD WITHOUT ESOPHAGITIS: ICD-10-CM

## 2020-01-22 DIAGNOSIS — H90.3 SENSORINEURAL HEARING LOSS (SNHL) OF BOTH EARS: ICD-10-CM

## 2020-01-22 DIAGNOSIS — H72.93: ICD-10-CM

## 2020-01-22 PROCEDURE — 99213 OFFICE O/P EST LOW 20 MIN: CPT | Performed by: OTOLARYNGOLOGY

## 2020-01-22 NOTE — PROGRESS NOTES
Meggan Palacios LPN   Patient Intake Note    Review of Systems  Review of Systems   Constitutional: Negative for chills, diaphoresis and fever.   HENT:        See HPI   Respiratory: Positive for cough. Negative for choking and shortness of breath.    Gastrointestinal: Negative for diarrhea, nausea and vomiting.   Neurological: Negative for dizziness, light-headedness and headaches.   Psychiatric/Behavioral: Negative for sleep disturbance.       Tobacco Use: Screening and Cessation Intervention  Social History    Tobacco Use      Smoking status: Never Smoker      Smokeless tobacco: Never Used      Meggan Palacios LPN  1/22/2020  9:55 AM

## 2020-01-22 NOTE — PROGRESS NOTES
Elie Harvey Jr, MD     ENT FOLLOW UP NOTE     Chief Complaint   Patient presents with   • Follow-up        HISTORY OF PRESENT ILLNESS:  Accompanied by:  No one  Tawny Shin is a  64 y.o. female who is here for follow up. She says ears are still full. She can pop at times. L>R.  She still has green.  She has been hoarse from 5p until 9AM.  She is using nasal medications.  She has had hearing testing in 12/2019.  She says hearing aids are helping.  She has had a little reflux, worsening int he last 6 months. She cannot swallow water in the AM because dry. She has to drink water in the middle of the night.    Review of Systems  Reviewed per patient intake note and confirmed by me    Past History:  Past medical and surgical history, family history and social history reviewed and updated when appropriate.  Current medications and allergies reviewed and updated when appropriate.  Allergies:  Atorvastatin; Amoxicillin; Nabumetone; Niacin er; Penicillins; and Simvastatin        Vital Signs:   Temp:  [97.4 °F (36.3 °C)] 97.4 °F (36.3 °C)  Heart Rate:  [82] 82  BP: (106)/(62) 106/62  EXAMINATION:  CONSTITUTIONAL:    well nourished, well-developed, alert, oriented, in no acute distress      BODY HABITUS:    Normal body habitus     COMMUNICATION:    able to communicate normally, normal voice quality     HEAD:     Normocephalic, without obvious abnormality, atraumatic     FACE:    structure normal, no tenderness present, no lesions/masses, no evidence of trauma     SALIVARY GLANDS:    parotid glands with no tenderness, no swelling, no masses, submandibular glands with normal size, nontender      EYE:    ocular motility normal, eyelids normal, orbits normal, no proptosis, conjunctiva clear, sclera non-icteric, pupils equal, round, reactive to light and accomodation  Color:   blue     HEARING:      response to conversational voice normal with hearing aids     EARS:    Otoscopic exam    EXTERNAL AUDITORY CANALS:     normal  ear canals without stenosis or significant cerumen, skin intact and normal shape   TYMPANIC MEMBRANES:     Abnormal tympanic membrane:        RIGHT: monomer posterior inferior, intact TM  LEFT: monomer poster inferior, TM intact   MIDDLE EAR:     Middle clear, no fluid, Ossicular chain intact   MASTOID:     Non-surgical, Non-tender    PINNA:     normal, non-tender, skin intact without lesions      NOSE EXTERNAL:    APPEARANCE: normal, straight, with good projection, no tenderness, no lesions, no tenderness, good nasal support, patent nares     NOSE INTERNAL:    Anterior rhinoscopy   NASALMUCOSA:    abnormal:        Bilateral- bluish, boggy    NASAL PASSAGES:     abnormal   Bilateral- narrowed    NASAL VALVE:     abnormal  Bilateral- mildly weak    SEPTUM:     mucosa abnormal   Bilateral- bluish, boggy     midline   INFERIOR TURBINATES:     abnormal  Bilateral- bluish, boggy, enlarged      ORAL CAVITY:    Normal lips with no lesions, dentition normal for age, FOM intact without lesions and normal salivary flow, Mucosa intact without lesions, Hard and soft palate normal without lesions     OROPHARYNX:    Direct examination  oropharyngeal mucosa normal, tonsil(s) with normal appearance       NECK:    normal appearance, no masses, no lesions, larynx normal mobility, trachea midline   Neck position: lordotic     LYMPH NODES :    no adenopathy     THYROID:    no overt thyromegaly, no tenderness, nodules or mass present on palpation, position midline     CHEST/RESPIRATORY:    respiratory effort normal, no rales, rubs or wheezing, no stridor, normal appearance to chest     CARDIOVASCULAR:    regular rate and rhythm, no murmurs, gallups, no peripheral edema     NEURO/PSYCHIATRIC :    oriented appropriately for age, mood normal, affect appropriate, cranial nerves intact grossly (unless specifically described), gait normal for age    RESULTS REVIEW:    I have reviewed the patients old records in the chart.           ASSESSMENT:   Diagnosis Plan   1. Dysfunction of right eustachian tube      Cartilaginous stiffness   2. Sensorineural hearing loss (SNHL) of both ears      [place] hearing aids   3. Presence of external hearing aid      [place]   4. Monomeric tympanic membrane of both ears     5. Dysphonia     6. GERD without esophagitis      Partially controlled  ETD R side   7. Laryngopharyngeal reflux (LPR)      Partially controlled           PLAN:  Conservative management.  Patient has cartilaginous ETD with stiff cartilage. I see no other findings. I have offered balloon tubplasty. She does not wish at this time.  She has dry throat. No scope today. I will treat first for dryness and GERD.  Continue Flonase, nasal saline  GERD regimen  Antacids  MY CHART:  Patient is Encouraged to enroll in My Chart  Encouraged to review data and findings in My Chart          Patient understand(s) and agree(s) with the treatment plan as described.    Return in about 2 months (around 3/22/2020) for Recheck voice, throat, Flex scope.     Elie Harvey Jr, MD  01/22/20  10:24 AM

## 2020-01-22 NOTE — PATIENT INSTRUCTIONS
"THE PROBLEM OF ACID REFLUX    In BOTH children and adults, irritating acids may leak from the stomach and come up into the esophagus and throat. This can occur at any time, but occurs more commonly when lying down. When the acid touches the lining of the esophagus, there is irritation and muscle spasm, which can be felt up into the throat. Usually this is felt as \"heartburn\". When scarring of the esophagus occurs, the esophagus becomes less sensitive and the symptoms may only be in the throat.     Some of the symptoms that can occur with acid reflux into the throat include coughing, soreness, burning, hoarseness, throat clearing, excessive mucous, bad taste in the mouth, bad breath, a sensation of a lump in the throat, wheezing, post-nasal drip, choking spells, bleeding and nausea. In a small percentage of people, more serious problems may result, such as pneumonia, ulcers in the larynx (voice box), reduced mobility of the vocal cords and a pouch in the upper esophagus (diverticulum). In children, the symptoms may be different and include persistent vomiting, bleeding from the esophagus, respiratory problems, pneumonia, asthma, choking spells, swallowing problems and anemia. In some cases, unexplained fussiness and crying is due to reflux.     The following instructions are designed to help neutralize the stomach acid, reduce the production of the acid, promote emptying of the acid from the stomach into the intestines and prevent acid from coming up into the esophagus. You should adopt enough of these changes to alleviate your symptoms. If carrying out these suggestions produces no change, it is imperative that you return so that we can discuss further the problem. More testing may be required, as might medication. It is important to realize that the esophagus takes time to heal, so you should not expect results immediately.    Diet  Most often, the throat symptoms above are due to dietary abuses. Certain foods are " known to cause irritation, because they are acids themselves or cause excess production of stomach acid. These should be avoided, if possible. The following is a partial list of foods recognized as troublesome: coffee (even decaffeinated), tea chocolate, cola beverages, citrus beverages (such as 7-Up), caffeine containing beverages, alcohol, citrus fruits and juices, highly spiced foods, fatty foods, candies, nuts, mints, milk and milk products. Some of the worst offenders for promoting stomach acid are milk and beer.     Hard candies, gum, breath fresheners, throat lozenges, cough drops, mouthwashes, gargles, may actually irritate the throat directly (many cough drops and lozenges contain irritants such as oil of eucalyptus and menthol), and can also stimulate acid production directly.     Large amounts of liquid in the diet tend to promote reflux, because liquids tend to come back up more easily than solids.     Meals should be bland and consist of real food, trying to avoid recreational food. Multiple small meals, rather than one or two large meals helps prevent reflux by not stretching the stomach and increasing the time needed for digestion, as well increasing the amount of acid needed to digest the meal. If you are overweight, losing weight is tremendously helpful.     YOU SHOULD NOT EAT FOR AT LEAST TWO HOURS BEFORE RETIRING AND YOU SHOULD REMAIN SITTING OR STANDING FOR AT LEAST 45 MINUTES AFTER EACH MEAL. This promotes passage of food from the stomach into the intestine.    Posture  Body weight is a significant factor in promoting reflux. Losing weight is beneficial. Pregnancy will markedly increase the symptoms of heartburn and throat pain. You should avoid clothing that fits tightly across the mid-section, as this promotes squeezing of the abdominal contents upward. It is helpful to practice abdominal or diaphragmatic breathing, using the stomach to push out each breath rather than chest expansion. Avoid  slumping while sitting, and avoid stooping or straining.     For many, the reflux occurs at night while sleeping. This is the time acid production is the greatest and you are lying down, a position that promotes reflux, thus setting up the irritation that occurs the next morning. One of the most important things you can do is to elevate the head of the bed, in an effort to use gravity to keep the acid in the stomach. This is accomplished by placed blocks or bricks beneath the legs at the head of the bed, raising the head 6-10 inches. Do not make the head so high that you slide down. Pillows are not effective because they cause the body to curl, increasing abdominal pressure and it is difficult to remain upright on them. Additionally, pillows promote sleeping on the back, but it is far more desirable to sleep on the right side or on the stomach, as this allows gas to escape, while preventing the escape of acid material. Children and infants, who are refluxing, should sleep on their stomachs.  Exercise  Regular exercise is extremely beneficial in promoting good digestion and regularity. The abdominal muscles are toned, which aids in controlling acid problems. However, it is recommended that you not participate in vigorous activity immediately after eating. This causes pressure on an already full stomach, forcing acid up into the esophagus. Regular exercise is encouraged, prior to meals, or two hours after meals.  Antacids  Antacids should be used regularly, as they neutralize excess acid. Gelusil II, Mylanta II, Tums and Rolaids are popular brands. Gaviscon is not an antacid, but floats on top of the stomach acid, preventing esophageal irritation. Care should be used in administering large doses of antacids, especially in children. Many antacid preparations contain magnesium, which promotes frequent bowel movements, while antacids that contain aluminum can be constipating. Tums have a high calcium content that can be  used to some advantage in older women with reflux.     Antacid tablets are convenient, but the liquid form tends to work much more quickly. Also remember to check with your physician about interference with other medications. Antacids can slow the absorption of digitalis, Indocin, tetracyclines, fluorides and isoniazid from the intestines. Many medications require the presence of stomach acid to be absorbed.     Initially, antacids should be used 30 minutes after each meal, 2 hours after each meal and at bedtime. This maximizes the neutralization of the stomach acid. Antacids can be used more frequently if symptoms persist, but such extensive use needs to be reviewed with your physician.  Prescription Medications  If the above recommendations are not effectively resolving the symptoms, medications may be prescribed. These are usually in the form of acid production blockers, such as Tagamet, Zantac, Pepcid, or Axid or acid pump inhibitors like Prevacid, Nexium, Protonix or Prilosec. These drugs help stop acid production in the stomach. Medications such as Reglan can be used to promote stomach emptying.  Medications That Promote Reflux  Certain medications can promote acid reflux; either by relaxing the sphincter muscle that helps keeps stomach acid down, or increasing the acid production. These include progesterone (Provera, Ortho Novum, Ovral, other birth control pills), theophylline (Marcin-Dur, etc.), anticholinergic (Donnatal, Scopolamine, Probanthine, Bentil, others), beta blockers (Inderal, Tenormin and Corgard), alpha blockers (Dibenzyline), dopamine, calcium channel blockers (Procardia, Cardizem, Isotin, Calan), aspirin, non-steroidal anti-inflammatory drugs (Motrin, Advil, Nuprin, Nalfon, Rufen, Indocin, Feldene, Clinoril and Tolectin). Vitamin C is an acid and can promote reflux. This is only a partial list and before stopping any prescription medication, you should check with your physician.    Goal  The  goal is to reduce the symptoms with the least number of lifestyle changes. A combination of the above suggestions is frequently prescribed to return you to normal as quickly as possible. However, this problem did not develop overnight and will not disappear rapidly. Therefore, developing a regimen will aid in controlling symptoms and keep you symptom free for a long period of time. Other alternatives exist, should these suggestions fail, and can be discussed with your physician.     To Summarize:  Watch your diet, avoid foods that cause acid reflux  Lose weight  Avoid tight fitting clothes  Adjust your posture, raise the head of the bed  Exercise regularly  Use antacids  Use prescription medication as directed  Avoid medications that promote reflux  Avoid behavior and eating habits that promote reflux of stomach acid     You are welcome to do a Google search on the topic of reflux and reflux diets. The internet has many useful resources.     Please remember, your success in controlling this condition depends on many factors including diet, exercise, medications and follow up. All are important and must be utilized to achieve success.     ANTACID USE:  Use antacids 30 mins after every meal, 2 hours after every meal and at Bedtime  Use Gaviscon Extra (best), Tums, Rolaids, etc  Over the counter  Use 2 tsp or 2 tabs as the dose  Remember that some of these products contain Calcium or Aluminum. If you have dietary or medical issues, please inform physician    Diet  Exercise    Continue Flonase and nasal saline    Thank you for enrolling in [facility]. Please follow the instructions below to securely access your online medical record. [facility] allows you to send messages to your doctor, view your test results, renew your prescriptions, schedule appointments, and more.    How Do I Sign Up?  1. In your Internet browser, go to [employer]  2. Click on the Sign Up Now link in the New User? box.   3. Enter your  [facility] Activation Code exactly as it appears below. You will not need to use this code after you have completed the sign-up process. If you do not sign up before the expiration date, you must request a new code.  [facility] Activation Code: [email or web address]  Expires: 2/21/2020  9:45 AM    4. Enter the last four digits of your Social Security Number and your Date of Birth as indicated and click Next. You will be taken to the next sign-up page.  5. Create a [facility] username. Think of one that is secure and easy to remember.  6. Create a [facility] password. You can change your password at any time.  7. Choose a security question, enter your answer, and click Next. This can be used to access [facility] if you forget your password.   8. Select your communication preference. Enter a valid e-mail address if you would like to receive e-mail notifications when new information is available in [facility].  9. Click Sign In. You can now view your medical record.     Additional Information  If you have questions, you can e-mail [email or web address], or call [phone] to talk to our [facility] staff. Remember, [facility] is NOT to be used for urgent needs. For medical emergencies, dial 911.    VOICE HYGIENE:    Many factors go into manufacturing what we call the voice. More goes on than just breathing out and using the voice box and mouth. Creating the voice requires good lung function, a healthy voice box, and complex muscle coordination. Any malfunction in any of the systems and voice problems can occur.    The most common problem seen in the office is hoarseness. Other types of voice problems can include breathiness, double voice, raspy voice, or simply a change in the pitch. Many things can cause a hoarse or weak voice, including laryngitis, vocal abuse (screaming, cheering, singing too loud for too long), smoking, etc. In order to treat the problem, your physician first must find the cause, then try to correct it to restore your  normal voice.    The first part of a voice analysis is a detailed history of the problem, including any habits such as smoking, to delineate possible factors. The second step is a careful examination of the head and neck, including the voice box. The examination is essential, as this eliminates any anatomic problems with the vocal cords and the surrounding structures. More detailed analysis may be used in the form of videotaping the vocal cords with a stroboscopic light, thus providing additional information.     Suggestions for Voice Use and Conservation    Treatment of voice problems depends on the cause of the problems. Fortunately, most problems are easily correctable. The following are a few suggestions you will be asked to carry out, depending on the results of your examination.    >Avoid stress  >Avoid habitually clearing throat as this strains and fatigues the vocal cords  >Avoid yelling, especially for extended periods of time  >Avoid falsetto singing  >Avoid talking when it hurts or if your neck is excessively tired.  >Do exercise regularly to maintain fitness and breathe control. Do eat correctly, to avoid acid reflux  >Drink 6-8 glasses of water daily to keep the vocal cords flexible, making it easier to speak  >Do take sips of water while speaking as swallowing helps relax the muscles of the throat and neck  >Do sip water, sniff or say the letter “H” forcefully rather than clearing throat  >Do listen to your voice as you speak to avoid trailing off or “tightness” at the end of sentences  >Do use plenty of breath while speaking. If you feel you are running out of breath, stop, breathe then continue.  >Do speak easily rather than pushing, forcing or straining  >Do stop talking and take a voice break, especially if you are fading or fatiguing  >Do increase your awareness of your voice. When does it sound better, worse? >Recording your voice or watching yourself speak in a mirror to watch for movement in  the neck and shoulders can be beneficial.    Whispering is not a protective mechanism for the voice. In fact, whispering can make your condition worse. In order to limit further damage to your voice, maintain a normal volume and tone. Only in certain circumstances will this recommendation require modification. Dr. Harvey will tell you if that is the case.    Complete voice rest is used occasionally to treat voice and vocal cord problems. However, this is a rare situation. Dr. Harvey will tell you if this is indicated.    A great deal of voice hygiene is based on common sense. If it hurts to talk, then a problem exists.    Vocal Strengthening Exercises    >Sit in a comfortable and upright position. The chair should have a hard back and hard arm rests.  >Rest your hands on the arms of the chair. Push down with both of your hands at the same time. Do not use too much shoulder movement, simply push with your hands. You should feel a tightening of your abdominal muscles, not your neck muscles.  >After you become comfortable with the pushing motion, begin to make a short sound (such as at/ ah) each time you push down. You can substitute other short sounds such as a vowel sound or counting.  >Be sure to take a deep breath before each push down and vocalization of the sound.  Summary of exercise sequence:   Take a deep breath in  Push down while saying ah  Do NOT hold your breath  Relax and breathe out  Repeat above sequence 15-20 times every hour or several times each day.    Speech Pathology    Frequently, physicians and speech pathologists treat voice disorders together. These are highly trained individuals who specialize in therapy for the voice box and throat. You may recognize the better as speech, swallowing or voice therapists. Dr. Harvey may elect to refer you for evaluation and treatment, depending on your diagnosis and condition.    Special Testing    You may be referred certain tests to fully evaluate your  throat, swallowing or voice. These may include CAT scans, swallowing tests, MRIs, or videostroboscopy. You may not be familiar these tests but the results will discussed them with you at the time of your office visit and answer your questions about them.

## 2020-02-24 DIAGNOSIS — M54.40 LUMBAGO OF MULTIPLE SITES IN SPINE WITH SCIATICA: Primary | ICD-10-CM

## 2020-04-03 RX ORDER — PREDNISONE 1 MG/1
2 TABLET ORAL DAILY
Qty: 180 TABLET | Refills: 3 | Status: SHIPPED | OUTPATIENT
Start: 2020-04-03 | End: 2020-08-05

## 2020-04-10 ENCOUNTER — TELEPHONE (OUTPATIENT)
Dept: OBSTETRICS AND GYNECOLOGY | Facility: CLINIC | Age: 65
End: 2020-04-10

## 2020-04-10 RX ORDER — ISOSORBIDE MONONITRATE 60 MG/1
60 TABLET, EXTENDED RELEASE ORAL DAILY
Qty: 90 TABLET | Refills: 3 | Status: SHIPPED | OUTPATIENT
Start: 2020-04-10 | End: 2020-04-13 | Stop reason: SDUPTHER

## 2020-04-13 RX ORDER — ISOSORBIDE MONONITRATE 60 MG/1
60 TABLET, EXTENDED RELEASE ORAL 2 TIMES DAILY
Qty: 180 TABLET | Refills: 4 | Status: SHIPPED | OUTPATIENT
Start: 2020-04-13 | End: 2021-03-26 | Stop reason: HOSPADM

## 2020-04-14 ENCOUNTER — TELEPHONE (OUTPATIENT)
Dept: OBSTETRICS AND GYNECOLOGY | Facility: CLINIC | Age: 65
End: 2020-04-14

## 2020-04-14 RX ORDER — CLOPIDOGREL BISULFATE 75 MG/1
TABLET ORAL
Qty: 90 TABLET | Refills: 3 | Status: SHIPPED | OUTPATIENT
Start: 2020-04-14 | End: 2021-03-26

## 2020-05-04 RX ORDER — SPIRONOLACTONE 25 MG/1
TABLET ORAL
Qty: 90 TABLET | Refills: 3 | Status: ON HOLD | OUTPATIENT
Start: 2020-05-04 | End: 2021-02-11

## 2020-05-07 RX ORDER — FOLIC ACID 1 MG/1
TABLET ORAL
Qty: 90 TABLET | Refills: 3 | Status: ON HOLD | OUTPATIENT
Start: 2020-05-07 | End: 2021-03-23

## 2020-05-14 RX ORDER — PANTOPRAZOLE SODIUM 40 MG/1
TABLET, DELAYED RELEASE ORAL
Qty: 90 TABLET | Refills: 3 | Status: ON HOLD | OUTPATIENT
Start: 2020-05-14 | End: 2021-03-23

## 2020-05-21 RX ORDER — EZETIMIBE 10 MG/1
TABLET ORAL
Qty: 90 TABLET | Refills: 3 | Status: SHIPPED | OUTPATIENT
Start: 2020-05-21 | End: 2020-06-25

## 2020-05-27 PROBLEM — M81.8 OTHER OSTEOPOROSIS WITHOUT CURRENT PATHOLOGICAL FRACTURE: Status: ACTIVE | Noted: 2020-05-27

## 2020-05-28 ENCOUNTER — TELEPHONE (OUTPATIENT)
Dept: OBSTETRICS AND GYNECOLOGY | Facility: CLINIC | Age: 65
End: 2020-05-28

## 2020-05-28 ENCOUNTER — APPOINTMENT (OUTPATIENT)
Dept: LAB | Facility: HOSPITAL | Age: 65
End: 2020-05-28

## 2020-05-28 ENCOUNTER — APPOINTMENT (OUTPATIENT)
Dept: ONCOLOGY | Facility: HOSPITAL | Age: 65
End: 2020-05-28

## 2020-05-28 NOTE — TELEPHONE ENCOUNTER
Nini with [facility] calls - pt is scheduled for [facility] lab work at 9:45am tomorrow (5/29).  Need orders entered.  Thank you

## 2020-05-29 ENCOUNTER — APPOINTMENT (OUTPATIENT)
Dept: LAB | Facility: HOSPITAL | Age: 65
End: 2020-05-29

## 2020-05-29 ENCOUNTER — INFUSION (OUTPATIENT)
Dept: ONCOLOGY | Facility: HOSPITAL | Age: 65
End: 2020-05-29

## 2020-05-29 VITALS
BODY MASS INDEX: 33.11 KG/M2 | WEIGHT: 206 LBS | SYSTOLIC BLOOD PRESSURE: 136 MMHG | RESPIRATION RATE: 18 BRPM | TEMPERATURE: 98.4 F | OXYGEN SATURATION: 98 % | HEIGHT: 66 IN | DIASTOLIC BLOOD PRESSURE: 62 MMHG | HEART RATE: 88 BPM

## 2020-05-29 DIAGNOSIS — M81.8 OTHER OSTEOPOROSIS WITHOUT CURRENT PATHOLOGICAL FRACTURE: Primary | ICD-10-CM

## 2020-05-29 DIAGNOSIS — M81.0 OSTEOPOROSIS, UNSPECIFIED OSTEOPOROSIS TYPE, UNSPECIFIED PATHOLOGICAL FRACTURE PRESENCE: Primary | ICD-10-CM

## 2020-05-29 LAB
25(OH)D3 SERPL-MCNC: 67.5 NG/ML (ref 30–100)
CALCIUM SPEC-SCNC: 9.4 MG/DL (ref 8.6–10.5)
MAGNESIUM SERPL-MCNC: 1.7 MG/DL (ref 1.6–2.4)
PHOSPHATE SERPL-MCNC: 2.8 MG/DL (ref 2.5–4.5)

## 2020-05-29 PROCEDURE — 84100 ASSAY OF PHOSPHORUS: CPT | Performed by: OBSTETRICS & GYNECOLOGY

## 2020-05-29 PROCEDURE — 96372 THER/PROPH/DIAG INJ SC/IM: CPT

## 2020-05-29 PROCEDURE — 82310 ASSAY OF CALCIUM: CPT | Performed by: OBSTETRICS & GYNECOLOGY

## 2020-05-29 PROCEDURE — 82306 VITAMIN D 25 HYDROXY: CPT | Performed by: OBSTETRICS & GYNECOLOGY

## 2020-05-29 PROCEDURE — 36415 COLL VENOUS BLD VENIPUNCTURE: CPT | Performed by: OBSTETRICS & GYNECOLOGY

## 2020-05-29 PROCEDURE — 25010000002 DENOSUMAB 60 MG/ML SOLUTION PREFILLED SYRINGE: Performed by: OBSTETRICS & GYNECOLOGY

## 2020-05-29 PROCEDURE — 83735 ASSAY OF MAGNESIUM: CPT | Performed by: OBSTETRICS & GYNECOLOGY

## 2020-05-29 RX ADMIN — DENOSUMAB 60 MG: 60 INJECTION SUBCUTANEOUS at 10:24

## 2020-06-02 ENCOUNTER — OFFICE VISIT (OUTPATIENT)
Dept: OTOLARYNGOLOGY | Facility: CLINIC | Age: 65
End: 2020-06-02

## 2020-06-02 VITALS
WEIGHT: 209.4 LBS | HEIGHT: 66 IN | HEART RATE: 90 BPM | BODY MASS INDEX: 33.65 KG/M2 | DIASTOLIC BLOOD PRESSURE: 83 MMHG | SYSTOLIC BLOOD PRESSURE: 160 MMHG | TEMPERATURE: 97.6 F

## 2020-06-02 DIAGNOSIS — H69.81 DYSFUNCTION OF RIGHT EUSTACHIAN TUBE: Primary | ICD-10-CM

## 2020-06-02 DIAGNOSIS — H72.93: ICD-10-CM

## 2020-06-02 DIAGNOSIS — H90.3 SENSORINEURAL HEARING LOSS (SNHL) OF BOTH EARS: ICD-10-CM

## 2020-06-02 DIAGNOSIS — R49.0 DYSPHONIA: ICD-10-CM

## 2020-06-02 DIAGNOSIS — K21.9 LARYNGOPHARYNGEAL REFLUX (LPR): ICD-10-CM

## 2020-06-02 DIAGNOSIS — K21.9 GERD WITHOUT ESOPHAGITIS: ICD-10-CM

## 2020-06-02 DIAGNOSIS — R68.2 DRY MOUTH: ICD-10-CM

## 2020-06-02 PROCEDURE — 99213 OFFICE O/P EST LOW 20 MIN: CPT | Performed by: OTOLARYNGOLOGY

## 2020-06-02 NOTE — PATIENT INSTRUCTIONS
"THE PROBLEM OF ACID REFLUX    In BOTH children and adults, irritating acids may leak from the stomach and come up into the esophagus and throat. This can occur at any time, but occurs more commonly when lying down. When the acid touches the lining of the esophagus, there is irritation and muscle spasm, which can be felt up into the throat. Usually this is felt as \"heartburn\". When scarring of the esophagus occurs, the esophagus becomes less sensitive and the symptoms may only be in the throat.     Some of the symptoms that can occur with acid reflux into the throat include coughing, soreness, burning, hoarseness, throat clearing, excessive mucous, bad taste in the mouth, bad breath, a sensation of a lump in the throat, wheezing, post-nasal drip, choking spells, bleeding and nausea. In a small percentage of people, more serious problems may result, such as pneumonia, ulcers in the larynx (voice box), reduced mobility of the vocal cords and a pouch in the upper esophagus (diverticulum). In children, the symptoms may be different and include persistent vomiting, bleeding from the esophagus, respiratory problems, pneumonia, asthma, choking spells, swallowing problems and anemia. In some cases, unexplained fussiness and crying is due to reflux.     The following instructions are designed to help neutralize the stomach acid, reduce the production of the acid, promote emptying of the acid from the stomach into the intestines and prevent acid from coming up into the esophagus. You should adopt enough of these changes to alleviate your symptoms. If carrying out these suggestions produces no change, it is imperative that you return so that we can discuss further the problem. More testing may be required, as might medication. It is important to realize that the esophagus takes time to heal, so you should not expect results immediately.    Diet  Most often, the throat symptoms above are due to dietary abuses. Certain foods are " known to cause irritation, because they are acids themselves or cause excess production of stomach acid. These should be avoided, if possible. The following is a partial list of foods recognized as troublesome: coffee (even decaffeinated), tea chocolate, cola beverages, citrus beverages (such as 7-Up), caffeine containing beverages, alcohol, citrus fruits and juices, highly spiced foods, fatty foods, candies, nuts, mints, milk and milk products. Some of the worst offenders for promoting stomach acid are milk and beer.     Hard candies, gum, breath fresheners, throat lozenges, cough drops, mouthwashes, gargles, may actually irritate the throat directly (many cough drops and lozenges contain irritants such as oil of eucalyptus and menthol), and can also stimulate acid production directly.     Large amounts of liquid in the diet tend to promote reflux, because liquids tend to come back up more easily than solids.     Meals should be bland and consist of real food, trying to avoid recreational food. Multiple small meals, rather than one or two large meals helps prevent reflux by not stretching the stomach and increasing the time needed for digestion, as well increasing the amount of acid needed to digest the meal. If you are overweight, losing weight is tremendously helpful.     YOU SHOULD NOT EAT FOR AT LEAST TWO HOURS BEFORE RETIRING AND YOU SHOULD REMAIN SITTING OR STANDING FOR AT LEAST 45 MINUTES AFTER EACH MEAL. This promotes passage of food from the stomach into the intestine.    Posture  Body weight is a significant factor in promoting reflux. Losing weight is beneficial. Pregnancy will markedly increase the symptoms of heartburn and throat pain. You should avoid clothing that fits tightly across the mid-section, as this promotes squeezing of the abdominal contents upward. It is helpful to practice abdominal or diaphragmatic breathing, using the stomach to push out each breath rather than chest expansion. Avoid  slumping while sitting, and avoid stooping or straining.     For many, the reflux occurs at night while sleeping. This is the time acid production is the greatest and you are lying down, a position that promotes reflux, thus setting up the irritation that occurs the next morning. One of the most important things you can do is to elevate the head of the bed, in an effort to use gravity to keep the acid in the stomach. This is accomplished by placed blocks or bricks beneath the legs at the head of the bed, raising the head 6-10 inches. Do not make the head so high that you slide down. Pillows are not effective because they cause the body to curl, increasing abdominal pressure and it is difficult to remain upright on them. Additionally, pillows promote sleeping on the back, but it is far more desirable to sleep on the right side or on the stomach, as this allows gas to escape, while preventing the escape of acid material. Children and infants, who are refluxing, should sleep on their stomachs.  Exercise  Regular exercise is extremely beneficial in promoting good digestion and regularity. The abdominal muscles are toned, which aids in controlling acid problems. However, it is recommended that you not participate in vigorous activity immediately after eating. This causes pressure on an already full stomach, forcing acid up into the esophagus. Regular exercise is encouraged, prior to meals, or two hours after meals.  Antacids  Antacids should be used regularly, as they neutralize excess acid. Gelusil II, Mylanta II, Tums and Rolaids are popular brands. Gaviscon is not an antacid, but floats on top of the stomach acid, preventing esophageal irritation. Care should be used in administering large doses of antacids, especially in children. Many antacid preparations contain magnesium, which promotes frequent bowel movements, while antacids that contain aluminum can be constipating. Tums have a high calcium content that can be  used to some advantage in older women with reflux.     Antacid tablets are convenient, but the liquid form tends to work much more quickly. Also remember to check with your physician about interference with other medications. Antacids can slow the absorption of digitalis, Indocin, tetracyclines, fluorides and isoniazid from the intestines. Many medications require the presence of stomach acid to be absorbed.     Initially, antacids should be used 30 minutes after each meal, 2 hours after each meal and at bedtime. This maximizes the neutralization of the stomach acid. Antacids can be used more frequently if symptoms persist, but such extensive use needs to be reviewed with your physician.  Prescription Medications  If the above recommendations are not effectively resolving the symptoms, medications may be prescribed. These are usually in the form of acid production blockers, such as Tagamet, Zantac, Pepcid, or Axid or acid pump inhibitors like Prevacid, Nexium, Protonix or Prilosec. These drugs help stop acid production in the stomach. Medications such as Reglan can be used to promote stomach emptying.  Medications That Promote Reflux  Certain medications can promote acid reflux; either by relaxing the sphincter muscle that helps keeps stomach acid down, or increasing the acid production. These include progesterone (Provera, Ortho Novum, Ovral, other birth control pills), theophylline (Marcin-Dur, etc.), anticholinergic (Donnatal, Scopolamine, Probanthine, Bentil, others), beta blockers (Inderal, Tenormin and Corgard), alpha blockers (Dibenzyline), dopamine, calcium channel blockers (Procardia, Cardizem, Isotin, Calan), aspirin, non-steroidal anti-inflammatory drugs (Motrin, Advil, Nuprin, Nalfon, Rufen, Indocin, Feldene, Clinoril and Tolectin). Vitamin C is an acid and can promote reflux. This is only a partial list and before stopping any prescription medication, you should check with your physician.    Goal  The  goal is to reduce the symptoms with the least number of lifestyle changes. A combination of the above suggestions is frequently prescribed to return you to normal as quickly as possible. However, this problem did not develop overnight and will not disappear rapidly. Therefore, developing a regimen will aid in controlling symptoms and keep you symptom free for a long period of time. Other alternatives exist, should these suggestions fail, and can be discussed with your physician.     To Summarize:  Watch your diet, avoid foods that cause acid reflux  Lose weight  Avoid tight fitting clothes  Adjust your posture, raise the head of the bed  Exercise regularly  Use antacids  Use prescription medication as directed  Avoid medications that promote reflux  Avoid behavior and eating habits that promote reflux of stomach acid     You are welcome to do a Google search on the topic of reflux and reflux diets. The internet has many useful resources.     Please remember, your success in controlling this condition depends on many factors including diet, exercise, medications and follow up. All are important and must be utilized to achieve success.     REFLUX MEDICATION USE:   Do Take:, Pantoprazole/Protonix, nightly    ANTACID USE:  yes  Use antacids 30 mins after every meal, 2 hours after every meal and at Bedtime  Use Gaviscon Extra (best), Tums, Rolaids, etc  Over the counter  Use 2 tsp or 2 tabs as the dose  Remember that some of these products contain Calcium or Aluminum. If you have dietary or medical issues, please inform physician    NASAL SALINE:  Use 2 puffs each nostril 4-6 times daily and more frequently if possible.  You can buy saline spray or you can make your own and use an old spray bottle to administer  Use a humidifier at bedside  Recipe for saline:d  Water                                 1 quart  Salt (table)                        1 tablespoon  Gylcerin (or Bruna Syrup)    1 teaspoon  Sodium bicarbonate            1 teaspoon  Sprays or Alison pots are recommended    Humidifier at bedside    Nasal steroid use:  Using nasal steroids:  You will be prescribed one of the following nasal steroids: Flonase, Nasacort, Nasonex, Rhinocort, Qnasl, Zetonna  2 puffs each nostril 2 times daily  Start as soon as possible  If you are using Afrin for 3 days with the nasal steroid,  Use Afrin first and wait 10 minutes to allow the nose to open. Then administer nasal steroids.    Diet  Exercise    Thank you for enrolling in [facility]. Please follow the instructions below to securely access your online medical record. [facility] allows you to send messages to your doctor, view your test results, renew your prescriptions, schedule appointments, and more.    How Do I Sign Up?  1. In your Internet browser, go to [email or web address]  2. Click on the Sign Up Now link in the New User? box.   3. Enter your [facility] Activation Code exactly as it appears below. You will not need to use this code after you have completed the sign-up process. If you do not sign up before the expiration date, you must request a new code.  [facility] Activation Code: [email or web address]  Expires: 7/2/2020  9:28 AM    4. Enter the last four digits of your Social Security Number and your Date of Birth as indicated and click Next. You will be taken to the next sign-up page.  5. Create a [facility] username. Think of one that is secure and easy to remember.  6. Create a [facility] password. You can change your password at any time.  7. Choose a security question, enter your answer, and click Next. This can be used to access [facility] if you forget your password.   8. Select your communication preference. Enter a valid e-mail address if you would like to receive e-mail notifications when new information is available in [facility].  9. Click Sign In. You can now view your medical record.     Additional Information  If you have questions, you can e-mail [email or web address], or call  [phone] to talk to our MyChart staff. Remember, MyChart is NOT to be used for urgent needs. For medical emergencies, dial 911.

## 2020-06-02 NOTE — PROGRESS NOTES
Kasie Byrne LPN   Patient Intake Note    Review of Systems  Review of Systems   Constitutional: Positive for fatigue. Negative for chills and fever.   HENT:        See HPI   Respiratory: Positive for cough. Negative for choking and shortness of breath.    Gastrointestinal: Negative for diarrhea, nausea and vomiting.   Skin: Negative for rash.   Neurological: Negative for dizziness, light-headedness and headaches.   Psychiatric/Behavioral: Positive for sleep disturbance.       Tobacco Use: Screening and Cessation Intervention  Social History    Tobacco Use      Smoking status: Never Smoker      Smokeless tobacco: Never Used        Kasie Byrne LPN  6/2/2020  09:38

## 2020-06-02 NOTE — PROGRESS NOTES
Elie Harvey Jr, MD     ENT FOLLOW UP NOTE     Chief Complaint   Patient presents with   • Hoarse   • Sore Throat        HISTORY OF PRESENT ILLNESS:  Accompanied by:  No one  Tawny Shin is a  64 y.o. female who is here for follow up. She has resolved ear issues.  She has feeling of throat thickness.  She will wake and cannot talk. She has green stuff in the AM. symptoms present since Feb.  She denies CPAP.  She will cough lying down. In mid day, she can cough and will feel crud coming.  She has no heartburn.  She has been using Gaviscon. No help.  Denies mouthbreathing. Denies snoring  If nose is clogged, she will dry out.    Review of Systems  Reviewed per patient intake note and confirmed by me    Past History:  Past medical and surgical history, family history and social history reviewed and updated when appropriate.  Current medications and allergies reviewed and updated when appropriate.  Allergies:  Atorvastatin; Amoxicillin; Nabumetone; Niacin er; Penicillins; and Simvastatin        Vital Signs:   Temp:  [97.6 °F (36.4 °C)] 97.6 °F (36.4 °C)  Heart Rate:  [90] 90  BP: (160)/(83) 160/83  EXAMINATION:  CONSTITUTIONAL:    well nourished, well-developed, alert, oriented, in no acute distress      BODY HABITUS:    Normal body habitus     COMMUNICATION:    able to communicate normally, normal voice quality     HEAD:     Normocephalic, without obvious abnormality, atraumatic     FACE:    structure normal, no tenderness present, no lesions/masses, no evidence of trauma     SALIVARY GLANDS:    parotid glands with no tenderness, no swelling, no masses, submandibular glands with normal size, nontender      EYE:    ocular motility normal, eyelids normal, orbits normal, no proptosis, conjunctiva clear, sclera non-icteric, pupils equal, round, reactive to light and accomodation  Color:   blue     HEARING:      response to conversational voice normal with hearing aids     EARS:    Otoscopic exam    EXTERNAL AUDITORY  CANALS:     normal ear canals without stenosis or significant cerumen, skin intact and normal shape   TYMPANIC MEMBRANES:     Abnormal tympanic membrane:        RIGHT: monomer posterior inferior, intact TM  LEFT: monomer poster inferior, TM intact   MIDDLE EAR:     Middle clear, no fluid, Ossicular chain intact   MASTOID:     Non-surgical, Non-tender    PINNA:     normal, non-tender, skin intact without lesions      NOSE EXTERNAL:    APPEARANCE: normal, straight, with good projection, no tenderness, no lesions, no tenderness, good nasal support, patent nares     NOSE INTERNAL:    Anterior rhinoscopy   NASALMUCOSA:    abnormal:        Bilateral- bluish, boggy    NASAL PASSAGES:     abnormal   Bilateral- narrowed    NASAL VALVE:     abnormal  Bilateral- mildly weak    SEPTUM:     mucosa abnormal   Bilateral- bluish, boggy     midline   INFERIOR TURBINATES:     abnormal  Bilateral- bluish, boggy, enlarged      ORAL CAVITY:    Normal lips with no lesions, dentition normal for age, FOM intact without lesions and normal salivary flow, Mucosa intact without lesions, Hard and soft palate normal without lesions     OROPHARYNX:    Direct examination  oropharyngeal mucosa normal, tonsil(s) with normal appearance       NECK:    normal appearance, no masses, no lesions, larynx normal mobility, trachea midline   Neck position: lordotic     LYMPH NODES :    no adenopathy     THYROID:    no overt thyromegaly, no tenderness, nodules or mass present on palpation, position midline     CHEST/RESPIRATORY:    respiratory effort normal, no rales, rubs or wheezing, no stridor, normal appearance to chest     CARDIOVASCULAR:    regular rate and rhythm, no murmurs, gallups, no peripheral edema     NEURO/PSYCHIATRIC :    oriented appropriately for age, mood normal, affect appropriate, cranial nerves intact grossly (unless specifically described), gait normal for age    RESULTS REVIEW:    No reports available for review  I have reviewed the  patients old records in the chart.     {SnapShot  Notes  Encounters  Result Review  Labs  Imaging  Meds  Media :23}      ASSESSMENT:      Diagnosis Plan   1. Dysfunction of right eustachian tube      Improved   2. Dysphonia      from DIEGO and dehydration   3. Monomeric tympanic membrane of both ears      Stable, no fluid   4. GERD without esophagitis      Silent reflus with LPR   5. Sensorineural hearing loss (SNHL) of both ears      No Audio to evaluate   6. Laryngopharyngeal reflux (LPR)      very symptomatic   7. Dry mouth      May need Sjogren's eval           PLAN:      Conservative management.  Patient does not feel she has reflux. She appears to have dry mouth and reflux. This is causing dysphonia. I will treat DIEGO more aggressively, increased hydration.  She may need sleep evaluation.   Reflux regimen  Pantoprazole at bedtime  Antacids  Humidifier  Diet  Exercise  MY CHART:  Patient is Encouraged to enroll in My Chart  Encouraged to review data and findings in My Chart          Patient understand(s) and agree(s) with the treatment plan as described.    Return in about 6 weeks (around 7/14/2020) for Recheck throat, scope.     Elie Harvey Jr, MD  06/02/20  09:59

## 2020-06-08 RX ORDER — LEVOTHYROXINE SODIUM 0.07 MG/1
TABLET ORAL
Qty: 90 TABLET | Refills: 3 | Status: SHIPPED | OUTPATIENT
Start: 2020-06-08 | End: 2020-06-25 | Stop reason: DRUGHIGH

## 2020-06-25 ENCOUNTER — OFFICE VISIT (OUTPATIENT)
Dept: INTERNAL MEDICINE | Facility: CLINIC | Age: 65
End: 2020-06-25

## 2020-06-25 VITALS
TEMPERATURE: 97.4 F | SYSTOLIC BLOOD PRESSURE: 120 MMHG | HEART RATE: 62 BPM | BODY MASS INDEX: 32.08 KG/M2 | DIASTOLIC BLOOD PRESSURE: 80 MMHG | WEIGHT: 199.6 LBS | OXYGEN SATURATION: 98 % | HEIGHT: 66 IN

## 2020-06-25 DIAGNOSIS — E78.2 MIXED HYPERLIPIDEMIA: Primary | ICD-10-CM

## 2020-06-25 DIAGNOSIS — R49.0 HOARSENESS: ICD-10-CM

## 2020-06-25 DIAGNOSIS — M05.79 RHEUMATOID ARTHRITIS INVOLVING MULTIPLE SITES WITH POSITIVE RHEUMATOID FACTOR (HCC): ICD-10-CM

## 2020-06-25 DIAGNOSIS — I10 ESSENTIAL HYPERTENSION: ICD-10-CM

## 2020-06-25 PROBLEM — D63.8: Status: ACTIVE | Noted: 2020-06-25

## 2020-06-25 PROBLEM — J45.998 ALLERGIC-INFECTIVE ASTHMA: Status: ACTIVE | Noted: 2020-06-25

## 2020-06-25 PROBLEM — M17.0 ARTHRITIS OF BOTH KNEES: Status: ACTIVE | Noted: 2020-06-25

## 2020-06-25 PROBLEM — E11.69: Status: ACTIVE | Noted: 2020-06-25

## 2020-06-25 PROCEDURE — G0439 PPPS, SUBSEQ VISIT: HCPCS | Performed by: INTERNAL MEDICINE

## 2020-06-25 RX ORDER — GUAIFENESIN 600 MG/1
TABLET, EXTENDED RELEASE ORAL
COMMUNITY
End: 2021-03-26 | Stop reason: HOSPADM

## 2020-06-25 RX ORDER — CYCLOSPORINE 0.5 MG/ML
1 EMULSION OPHTHALMIC 2 TIMES DAILY
COMMUNITY
End: 2020-08-12

## 2020-06-25 NOTE — PROGRESS NOTES
The ABCs of the Annual Wellness Visit  Subsequent Medicare Wellness Visit    Chief Complaint   Patient presents with   • Medicare Wellness-subsequent   • Cough     productive and green in color at times       Subjective   History of Present Illness:  Tawny Shin is a 64 y.o. female who presents for a Subsequent Medicare Wellness Visit.    HEALTH RISK ASSESSMENT    Recent Hospitalizations:  No hospitalization(s) within the last year.    Current Medical Providers:  Patient Care Team:  Davon Urrutia MD as PCP - General  [facility], Moises Matson MD as Consulting Physician (Otolaryngology)  Christiano Rao PA as Physician Assistant (Otolaryngology)  Candelaria David MD as Obstetrician (Obstetrics and Gynecology)  Davon Urrutia MD as Referring Physician (Internal Medicine)  Omar Hernandez MD as Cardiologist (Cardiology)    Smoking Status:  Social History     Tobacco Use   Smoking Status Never Smoker   Smokeless Tobacco Never Used       Alcohol Consumption:  Social History     Substance and Sexual Activity   Alcohol Use No       Depression Screen:   PHQ-2/PHQ-9 Depression Screening 6/25/2020   Little interest or pleasure in doing things 0   Feeling down, depressed, or hopeless 0   Total Score 0       Fall Risk Screen:  STEADI Fall Risk Assessment was completed, and patient is at HIGH risk for falls. Assessment completed on:6/25/2020    Health Habits and Functional and Cognitive Screening:  Functional & Cognitive Status 6/25/2020   Do you have difficulty preparing food and eating? No   Do you have difficulty bathing yourself, getting dressed or grooming yourself? No   Do you have difficulty using the toilet? Yes   Do you have difficulty moving around from place to place? Yes   Do you have trouble with steps or getting out of a bed or a chair? Yes   Current Diet Well Balanced Diet   Dental Exam Not up to date   Eye Exam Up to date   Exercise (times per week) 0 times per week   Current Exercise Activities  Include None   Do you need help using the phone?  No   Are you deaf or do you have serious difficulty hearing?  Yes   Do you need help with transportation? Yes   Do you need help shopping? No   Do you need help preparing meals?  No   Do you need help with housework?  No   Do you need help with laundry? No   Do you need help taking your medications? No   Do you need help managing money? No   Do you ever drive or ride in a car without wearing a seat belt? No   Have you felt unusual stress, anger or loneliness in the last month? No   Who do you live with? Alone   If you need help, do you have trouble finding someone available to you? No   Have you been bothered in the last four weeks by sexual problems? No         Does the patient have evidence of cognitive impairment? No    Asprin use counseling:Taking ASA appropriately as indicated    Age-appropriate Screening Schedule:  Refer to the list below for future screening recommendations based on patient's age, sex and/or medical conditions. Orders for these recommended tests are listed in the plan section. The patient has been provided with a written plan.    Health Maintenance   Topic Date Due   • ZOSTER VACCINE (2 of 3) 06/02/2014   • COLONOSCOPY  01/06/2017   • LIPID PANEL  07/05/2020   • INFLUENZA VACCINE  08/01/2020   • MAMMOGRAM  03/13/2021   • DXA SCAN  03/13/2021   • TDAP/TD VACCINES (2 - Td) 10/21/2023          The following portions of the patient's history were reviewed and updated as appropriate: allergies, current medications, past family history, past medical history, past social history, past surgical history and problem list.    Outpatient Medications Prior to Visit   Medication Sig Dispense Refill   • aspirin 81 MG EC tablet Take 81 mg by mouth Daily.     • carvedilol (COREG) 12.5 MG tablet Take 12.5 mg by mouth 2 (Two) Times a Day With Meals.     • Certolizumab Pegol (Cimzia) 2 X 200 MG kit Inject 400 mg under the skin into the appropriate area as  directed Every 28 (Twenty-Eight) Days.     • cetirizine (zyrTEC) 10 MG tablet Take 10 mg by mouth daily      • CloNIDine (CATAPRES) 0.1 MG tablet Take 0.2 mg by mouth Every Night.     • cycloSPORINE (RESTASIS) 0.05 % ophthalmic emulsion 1 drop 2 (Two) Times a Day.     • Evolocumab (REPATHA) 140 MG/ML solution prefilled syringe Inject  under the skin into the appropriate area as directed.     • fluticasone (FLONASE) 50 MCG/ACT nasal spray 2 sprays into the nostril(s) as directed by provider 2 (Two) Times a Day. 48 g 3   • fluticasone (VERAMYST) 27.5 MCG/SPRAY nasal spray 2 sprays into the nostril(s) as directed by provider Daily.     • folic acid (FOLVITE) 1 MG tablet TAKE 1 TABLET BY MOUTH DAILY 90 tablet 3   • furosemide (LASIX) 40 MG tablet Take 40 mg by mouth daily.     • isosorbide mononitrate (IMDUR) 60 MG 24 hr tablet Take 1 tablet by mouth 2 (Two) Times a Day. 180 tablet 4   • leflunomide (ARAVA) 20 MG tablet Take 20 mg by mouth Daily.     • levothyroxine (SYNTHROID, LEVOTHROID) 25 MCG tablet Take 25 mcg by mouth Daily.     • MULTIPLE VITAMIN PO Take 1 tablet by mouth daily.       • nitroglycerin (NITROSTAT) 0.4 MG SL tablet Place 1 tablet under the tongue every 5 minutes as needed for Chest pain     • pantoprazole (PROTONIX) 40 MG EC tablet TAKE 1 TABLET BY MOUTH DAILY 90 tablet 3   • predniSONE (DELTASONE) 1 MG tablet Take 2 tablets by mouth Daily. 180 tablet 3   • salsalate (DISALCID) 500 MG tablet Take 5 tablets on Monday, Wednesday, Friday, Saturday. Take 4 tablets on the other days 128 tablet 5   • spironolactone (ALDACTONE) 25 MG tablet TAKE 1 TABLET BY MOUTH DAILY 90 tablet 3   • ticagrelor (BRILINTA) 90 MG tablet tablet Take 90 mg by mouth 2 (Two) Times a Day.     • traMADol-acetaminophen (ULTRACET) 37.5-325 MG per tablet Take 1 tablet by mouth 2 (Two) Times a Day As Needed for Moderate Pain . 120 tablet 2   • valACYclovir (VALTREX) 500 MG tablet Take 1 tablet by mouth Daily. 30 tablet 0   • vitamin  D (ERGOCALCIFEROL) 26497 units capsule capsule Take 50,000 Units by mouth Every 7 (Seven) Days.     • guaiFENesin (MUCINEX) 600 MG 12 hr tablet Take  by mouth.     • clopidogrel (PLAVIX) 75 MG tablet Take 75 mg by mouth Daily.  4   • conjugated estrogens (PREMARIN) 0.625 MG/GM vaginal cream Insert  into the vagina 2 (Two) Times a Week. 1/2 gram pv twice weekly at bedtime 30 g 2   • ezetimibe (ZETIA) 10 MG tablet TAKE 1 TABLET BY MOUTH DAILY 90 tablet 3   • HUMIRA PEN 40 MG/0.8ML Pen-injector Kit Inject 40 mg under the skin Take As Directed. EVERY 10 DAYS     • levothyroxine (SYNTHROID, LEVOTHROID) 75 MCG tablet TAKE 1 TABLET BY MOUTH DAILY 90 tablet 3     No facility-administered medications prior to visit.        Patient Active Problem List   Diagnosis   • Eustachian tube dysfunction   • Sensorineural hearing loss   • Lumbago of multiple sites in spine with sciatica   • Spondylolisthesis of lumbar region   • Coronary artery disease   • Hyperlipidemia   • Hypertension   • Myalgia due to statin   • S/P coronary artery stent placement   • Pacemaker   • Rheumatoid arteritis (CMS/HCC)   • Sick sinus syndrome (CMS/HCC)   • Other osteoporosis without current pathological fracture   • Allergic-infective asthma   • Anemia of diabetes   • Arthritis of both knees       Advanced Care Planning:  ACP discussion was held with the patient during this visit. Patient has an advance directive (not in EMR), copy requested.    Review of Systems    Compared to one year ago, the patient feels her physical health is the same.  Compared to one year ago, the patient feels her mental health is the same.    Reviewed chart for potential of high risk medication in the elderly: yes  Reviewed chart for potential of harmful drug interactions in the elderly:yes    Objective         Vitals:    06/25/20 1410   BP: 120/80   BP Location: Left arm   Patient Position: Sitting   Cuff Size: Adult   Pulse: 62   Temp: 97.4 °F (36.3 °C)   TempSrc: Temporal  "  SpO2: 98%   Weight: 90.5 kg (199 lb 9.6 oz)   Height: 167.6 cm (66\")   PainSc:   4   PainLoc: Back  Comment: knee and hip       Body mass index is 32.22 kg/m².  Discussed the patient's BMI with her. The BMI is above average; BMI management plan is completed.    Physical Exam          Assessment/Plan   Medicare Risks and Personalized Health Plan  CMS Preventative Services Quick Reference  Advance Directive Discussion  Breast Cancer/Mammogram Screening  Colon Cancer Screening  Immunizations Discussed/Encouraged (specific immunizations; Shingrix )  Obesity/Overweight     The above risks/problems have been discussed with the patient.  Pertinent information has been shared with the patient in the After Visit Summary.  Follow up plans and orders are seen below in the Assessment/Plan Section.    Diagnoses and all orders for this visit:    1. Mixed hyperlipidemia (Primary)  -     Lipid Panel  -     TSH    2. Essential hypertension  -     Comprehensive Metabolic Panel  -     Uric Acid  -     Urinalysis With Microscopic - Urine, Clean Catch    3. Rheumatoid arthritis involving multiple sites with positive rheumatoid factor (CMS/[facility])  -     CBC & Differential    4. Hoarseness  -     CT Soft Tissue Neck With Contrast; Future      Follow Up:  No follow-ups on file.     An After Visit Summary and PPPS were given to the patient.       At this point I have gone ahead and ordered a CT of patient's neck due to the hoarseness that she is experiencing she has been seeing 1 of the ear nose and throat docs who has not yet done a laryngoscopy.  She apparently has a family history of laryngeal cancer.  I have encouraged her to call his office for follow-up on that evaluation      "

## 2020-06-25 NOTE — PROGRESS NOTES
Subjective   Tawny Shin is a 64 y.o. female.   Chief Complaint   Patient presents with   • Medicare Wellness-subsequent   • Cough     productive and green in color at times       This is a subsequent wellness visit for Ms. Shin who has chronic rheumatoid arthritis       The following portions of the patient's history were reviewed and updated as appropriate: allergies, current medications, past family history, past medical history, past social history, past surgical history and problem list.    Review of Systems    Objective   Past Medical History:   Diagnosis Date   • Arthritis    • Asthma    • Chronic sinusitis    • Coronary artery disease    • Eustachian tube dysfunction    • Heart disease    • Herpes simplex    • Hyperlipidemia    • Hypertension    • Laryngitis sicca    • Laryngitis, chronic    • MI (myocardial infarction) (CMS/[facility])    • Otorrhea    • Sensorineural hearing loss    • Sjogren's disease (CMS/[facility])       Past Surgical History:   Procedure Laterality Date   • A-V CARDIAC PACEMAKER INSERTION  2016   • ATRIAL CARDIAC PACEMAKER INSERTION     • CARDIAC CATHETERIZATION     • CORONARY ANGIOPLASTY WITH STENT PLACEMENT      X 2; 2013 & 2014   • LUMBAR FUSION N/A 1/19/2018    Procedure: L3-4,L4-5 DECOMPRESSION, POSTERIOR SPINAL FUSION WITH INSTRUMENTATION;  Surgeon: Fortino Oropeza MD;  Location: [facility] OR;  Service:    • LUMBAR LAMINECTOMY WITH FUSION Left 1/17/2018    Procedure: LEFT L3-4 L4-5 LATERAL LUMBAR INTERBODY FUSION;  Surgeon: Fortino Oropeza MD;  Location: [facility] OR;  Service:    • MYRINGOTOMY W/ TUBES  09/04/2014    TUBES NO LONGER IN PLACE   • OTHER SURGICAL HISTORY      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 so hardware was removed and spacers were placed   • REPLACEMENT TOTAL KNEE Right         Current Outpatient Medications:   •  aspirin 81 MG EC tablet, Take 81 mg by mouth Daily., Disp: , Rfl:   •  carvedilol (COREG) 12.5 MG tablet, Take 12.5 mg by mouth 2 (Two) Times a Day With Meals.,  Disp: , Rfl:   •  Certolizumab Pegol (Cimzia) 2 X 200 MG kit, Inject 400 mg under the skin into the appropriate area as directed Every 28 (Twenty-Eight) Days., Disp: , Rfl:   •  cetirizine (zyrTEC) 10 MG tablet, Take 10 mg by mouth daily , Disp: , Rfl:   •  CloNIDine (CATAPRES) 0.1 MG tablet, Take 0.2 mg by mouth Every Night., Disp: , Rfl:   •  cycloSPORINE (RESTASIS) 0.05 % ophthalmic emulsion, 1 drop 2 (Two) Times a Day., Disp: , Rfl:   •  Evolocumab (REPATHA) 140 MG/ML solution prefilled syringe, Inject  under the skin into the appropriate area as directed., Disp: , Rfl:   •  fluticasone (FLONASE) 50 MCG/ACT nasal spray, 2 sprays into the nostril(s) as directed by provider 2 (Two) Times a Day., Disp: 48 g, Rfl: 3  •  fluticasone (VERAMYST) 27.5 MCG/SPRAY nasal spray, 2 sprays into the nostril(s) as directed by provider Daily., Disp: , Rfl:   •  folic acid (FOLVITE) 1 MG tablet, TAKE 1 TABLET BY MOUTH DAILY, Disp: 90 tablet, Rfl: 3  •  furosemide (LASIX) 40 MG tablet, Take 40 mg by mouth daily., Disp: , Rfl:   •  isosorbide mononitrate (IMDUR) 60 MG 24 hr tablet, Take 1 tablet by mouth 2 (Two) Times a Day., Disp: 180 tablet, Rfl: 4  •  leflunomide (ARAVA) 20 MG tablet, Take 20 mg by mouth Daily., Disp: , Rfl:   •  levothyroxine (SYNTHROID, LEVOTHROID) 25 MCG tablet, Take 25 mcg by mouth Daily., Disp: , Rfl:   •  MULTIPLE VITAMIN PO, Take 1 tablet by mouth daily.  , Disp: , Rfl:   •  nitroglycerin (NITROSTAT) 0.4 MG SL tablet, Place 1 tablet under the tongue every 5 minutes as needed for Chest pain, Disp: , Rfl:   •  pantoprazole (PROTONIX) 40 MG EC tablet, TAKE 1 TABLET BY MOUTH DAILY, Disp: 90 tablet, Rfl: 3  •  predniSONE (DELTASONE) 1 MG tablet, Take 2 tablets by mouth Daily., Disp: 180 tablet, Rfl: 3  •  salsalate (DISALCID) 500 MG tablet, Take 5 tablets on Monday, Wednesday, Friday, Saturday. Take 4 tablets on the other days, Disp: 128 tablet, Rfl: 5  •  spironolactone (ALDACTONE) 25 MG tablet, TAKE 1 TABLET  BY MOUTH DAILY, Disp: 90 tablet, Rfl: 3  •  ticagrelor (BRILINTA) 90 MG tablet tablet, Take 90 mg by mouth 2 (Two) Times a Day., Disp: , Rfl:   •  traMADol-acetaminophen (ULTRACET) 37.5-325 MG per tablet, Take 1 tablet by mouth 2 (Two) Times a Day As Needed for Moderate Pain ., Disp: 120 tablet, Rfl: 2  •  valACYclovir (VALTREX) 500 MG tablet, Take 1 tablet by mouth Daily., Disp: 30 tablet, Rfl: 0  •  vitamin D (ERGOCALCIFEROL) 72276 units capsule capsule, Take 50,000 Units by mouth Every 7 (Seven) Days., Disp: , Rfl:   •  guaiFENesin (MUCINEX) 600 MG 12 hr tablet, Take  by mouth., Disp: , Rfl:      Vitals:    06/25/20 1410   BP: 120/80   Pulse: 62   Temp: 97.4 °F (36.3 °C)   SpO2: 98%         06/25/20  1410   Weight: 90.5 kg (199 lb 9.6 oz)     Patient's Body mass index is 32.22 kg/m². BMI is above normal parameters. Recommendations include: nutrition counseling.      Physical Exam          Assessment/Plan   There are no diagnoses linked to this encounter.

## 2020-06-26 LAB
ALBUMIN SERPL-MCNC: 4.3 G/DL (ref 3.5–5.2)
ALBUMIN/GLOB SERPL: 1.4 G/DL
ALP SERPL-CCNC: 75 U/L (ref 39–117)
ALT SERPL-CCNC: 16 U/L (ref 1–33)
APPEARANCE UR: CLEAR
AST SERPL-CCNC: 26 U/L (ref 1–32)
BACTERIA #/AREA URNS HPF: ABNORMAL /HPF
BASOPHILS # BLD AUTO: 0.07 10*3/MM3 (ref 0–0.2)
BASOPHILS NFR BLD AUTO: 1.2 % (ref 0–1.5)
BILIRUB SERPL-MCNC: 0.4 MG/DL (ref 0.2–1.2)
BILIRUB UR QL STRIP: NEGATIVE
BUN SERPL-MCNC: 18 MG/DL (ref 8–23)
BUN/CREAT SERPL: 20 (ref 7–25)
CALCIUM SERPL-MCNC: 9.3 MG/DL (ref 8.6–10.5)
CASTS URNS MICRO: ABNORMAL
CHLORIDE SERPL-SCNC: 105 MMOL/L (ref 98–107)
CHOLEST SERPL-MCNC: 111 MG/DL (ref 0–200)
CO2 SERPL-SCNC: 24.6 MMOL/L (ref 22–29)
COLOR UR: YELLOW
CREAT SERPL-MCNC: 0.9 MG/DL (ref 0.57–1)
EOSINOPHIL # BLD AUTO: 0.18 10*3/MM3 (ref 0–0.4)
EOSINOPHIL NFR BLD AUTO: 3.1 % (ref 0.3–6.2)
EPI CELLS #/AREA URNS HPF: ABNORMAL /HPF
ERYTHROCYTE [DISTWIDTH] IN BLOOD BY AUTOMATED COUNT: 16.5 % (ref 12.3–15.4)
GLOBULIN SER CALC-MCNC: 3 GM/DL
GLUCOSE SERPL-MCNC: 124 MG/DL (ref 65–99)
GLUCOSE UR QL: NEGATIVE
HCT VFR BLD AUTO: 36.8 % (ref 34–46.6)
HDLC SERPL-MCNC: 50 MG/DL (ref 40–60)
HGB BLD-MCNC: 12 G/DL (ref 12–15.9)
HGB UR QL STRIP: NEGATIVE
IMM GRANULOCYTES # BLD AUTO: 0.03 10*3/MM3 (ref 0–0.05)
IMM GRANULOCYTES NFR BLD AUTO: 0.5 % (ref 0–0.5)
KETONES UR QL STRIP: NEGATIVE
LDLC SERPL CALC-MCNC: 38 MG/DL (ref 0–100)
LEUKOCYTE ESTERASE UR QL STRIP: NEGATIVE
LYMPHOCYTES # BLD AUTO: 2.09 10*3/MM3 (ref 0.7–3.1)
LYMPHOCYTES NFR BLD AUTO: 36.3 % (ref 19.6–45.3)
MCH RBC QN AUTO: 30.7 PG (ref 26.6–33)
MCHC RBC AUTO-ENTMCNC: 32.6 G/DL (ref 31.5–35.7)
MCV RBC AUTO: 94.1 FL (ref 79–97)
MONOCYTES # BLD AUTO: 0.7 10*3/MM3 (ref 0.1–0.9)
MONOCYTES NFR BLD AUTO: 12.2 % (ref 5–12)
NEUTROPHILS # BLD AUTO: 2.68 10*3/MM3 (ref 1.7–7)
NEUTROPHILS NFR BLD AUTO: 46.7 % (ref 42.7–76)
NITRITE UR QL STRIP: NEGATIVE
NRBC BLD AUTO-RTO: 0.2 /100 WBC (ref 0–0.2)
PH UR STRIP: 6 [PH] (ref 5–8)
PLATELET # BLD AUTO: 213 10*3/MM3 (ref 140–450)
POTASSIUM SERPL-SCNC: 4.5 MMOL/L (ref 3.5–5.2)
PROT SERPL-MCNC: 7.3 G/DL (ref 6–8.5)
PROT UR QL STRIP: NEGATIVE
RBC # BLD AUTO: 3.91 10*6/MM3 (ref 3.77–5.28)
RBC #/AREA URNS HPF: ABNORMAL /HPF
SODIUM SERPL-SCNC: 140 MMOL/L (ref 136–145)
SP GR UR: 1.02 (ref 1–1.03)
TRIGL SERPL-MCNC: 117 MG/DL (ref 0–150)
TSH SERPL DL<=0.005 MIU/L-ACNC: 0.73 UIU/ML (ref 0.27–4.2)
URATE SERPL-MCNC: 5.7 MG/DL (ref 2.4–5.7)
UROBILINOGEN UR STRIP-MCNC: NORMAL MG/DL
VLDLC SERPL CALC-MCNC: 23.4 MG/DL
WBC # BLD AUTO: 5.75 10*3/MM3 (ref 3.4–10.8)
WBC #/AREA URNS HPF: ABNORMAL /HPF

## 2020-07-06 ENCOUNTER — TRANSCRIBE ORDERS (OUTPATIENT)
Dept: ADMINISTRATIVE | Facility: HOSPITAL | Age: 65
End: 2020-07-06

## 2020-07-06 DIAGNOSIS — Z01.818 PREOP TESTING: Primary | ICD-10-CM

## 2020-07-08 ENCOUNTER — HOSPITAL ENCOUNTER (OUTPATIENT)
Dept: CT IMAGING | Facility: HOSPITAL | Age: 65
Discharge: HOME | End: 2020-07-08
Admitting: INTERNAL MEDICINE

## 2020-07-08 DIAGNOSIS — R49.0 HOARSENESS: ICD-10-CM

## 2020-07-08 LAB — CREAT BLDA-MCNC: 0.9 MG/DL (ref 0.6–1.3)

## 2020-07-08 PROCEDURE — 82565 ASSAY OF CREATININE: CPT

## 2020-07-08 PROCEDURE — 25010000002 IOPAMIDOL 61 % SOLUTION: Performed by: INTERNAL MEDICINE

## 2020-07-08 PROCEDURE — 70491 CT SOFT TISSUE NECK W/DYE: CPT

## 2020-07-08 RX ADMIN — IOPAMIDOL 100 ML: 612 INJECTION, SOLUTION INTRAVENOUS at 15:40

## 2020-07-10 ENCOUNTER — LAB (OUTPATIENT)
Dept: LAB | Facility: HOSPITAL | Age: 65
End: 2020-07-10

## 2020-07-10 PROCEDURE — C9803 HOPD COVID-19 SPEC COLLECT: HCPCS | Performed by: OTOLARYNGOLOGY

## 2020-07-10 PROCEDURE — U0003 INFECTIOUS AGENT DETECTION BY NUCLEIC ACID (DNA OR RNA); SEVERE ACUTE RESPIRATORY SYNDROME CORONAVIRUS 2 (SARS-COV-2) (CORONAVIRUS DISEASE [COVID-19]), AMPLIFIED PROBE TECHNIQUE, MAKING USE OF HIGH THROUGHPUT TECHNOLOGIES AS DESCRIBED BY CMS-2020-01-R: HCPCS | Performed by: OTOLARYNGOLOGY

## 2020-07-10 RX ORDER — ISOSORBIDE MONONITRATE 60 MG/1
TABLET, EXTENDED RELEASE ORAL
Qty: 180 TABLET | Refills: 0 | Status: SHIPPED | OUTPATIENT
Start: 2020-07-10

## 2020-07-10 RX ORDER — ISOSORBIDE MONONITRATE 60 MG/1
60 TABLET, EXTENDED RELEASE ORAL 2 TIMES DAILY
Qty: 60 TABLET | Refills: 1 | Status: SHIPPED | OUTPATIENT
Start: 2020-07-10 | End: 2020-07-10

## 2020-07-12 LAB
COVID LABCORP PRIORITY: NORMAL
SARS-COV-2 RNA RESP QL NAA+PROBE: NOT DETECTED

## 2020-07-14 ENCOUNTER — OFFICE VISIT (OUTPATIENT)
Dept: OTOLARYNGOLOGY | Facility: CLINIC | Age: 65
End: 2020-07-14

## 2020-07-14 VITALS
BODY MASS INDEX: 31.98 KG/M2 | WEIGHT: 199 LBS | SYSTOLIC BLOOD PRESSURE: 132 MMHG | HEIGHT: 66 IN | HEART RATE: 93 BPM | DIASTOLIC BLOOD PRESSURE: 80 MMHG | TEMPERATURE: 97.9 F

## 2020-07-14 DIAGNOSIS — K21.9 LARYNGOPHARYNGEAL REFLUX (LPR): ICD-10-CM

## 2020-07-14 DIAGNOSIS — R68.2 DRY MOUTH: ICD-10-CM

## 2020-07-14 DIAGNOSIS — H90.3 SENSORINEURAL HEARING LOSS (SNHL) OF BOTH EARS: ICD-10-CM

## 2020-07-14 DIAGNOSIS — H72.93: ICD-10-CM

## 2020-07-14 DIAGNOSIS — H69.81 DYSFUNCTION OF RIGHT EUSTACHIAN TUBE: ICD-10-CM

## 2020-07-14 DIAGNOSIS — R49.0 DYSPHONIA: Primary | ICD-10-CM

## 2020-07-14 DIAGNOSIS — J32.0 MAXILLARY SINUSITIS, CHRONIC: ICD-10-CM

## 2020-07-14 DIAGNOSIS — Z97.4 PRESENCE OF EXTERNAL HEARING AID: ICD-10-CM

## 2020-07-14 DIAGNOSIS — K21.9 GERD WITHOUT ESOPHAGITIS: ICD-10-CM

## 2020-07-14 PROCEDURE — 99213 OFFICE O/P EST LOW 20 MIN: CPT | Performed by: OTOLARYNGOLOGY

## 2020-07-14 PROCEDURE — 31575 DIAGNOSTIC LARYNGOSCOPY: CPT | Performed by: OTOLARYNGOLOGY

## 2020-07-14 PROCEDURE — 31231 NASAL ENDOSCOPY DX: CPT | Performed by: OTOLARYNGOLOGY

## 2020-07-14 RX ORDER — CLINDAMYCIN HYDROCHLORIDE 150 MG/1
150 CAPSULE ORAL 3 TIMES DAILY
Qty: 30 CAPSULE | Refills: 0 | Status: SHIPPED | OUTPATIENT
Start: 2020-07-14 | End: 2020-08-05

## 2020-07-14 RX ORDER — PREDNISONE 10 MG/1
TABLET ORAL
Qty: 21 TABLET | Refills: 0 | Status: SHIPPED | OUTPATIENT
Start: 2020-07-14 | End: 2020-07-23

## 2020-07-14 NOTE — PROGRESS NOTES
Elie Harvey Jr, MD     ENT FOLLOW UP NOTE   No chief complaint on file.       HISTORY OF PRESENT ILLNESS:  Accompanied by:  No one  Tawny Shin is a  64 y.o. female who is here for follow up. She has had CT neck since last viait.  She has lost weight. DIEGO precautions using.  No better at all.    Review of Systems  Reviewed per patient intake note and confirmed by me    Past History:  Past medical and surgical history, family history and social history reviewed and updated when appropriate.  Current medications and allergies reviewed and updated when appropriate.  Allergies:  Atorvastatin; Escitalopram; Amoxicillin; Nabumetone; Niacin er; Penicillins; and Simvastatin        Vital Signs:   Temp:  [97.9 °F (36.6 °C)] 97.9 °F (36.6 °C)  Heart Rate:  [93] 93  BP: (132)/(80) 132/80  EXAMINATION:  CONSTITUTIONAL:    well nourished, well-developed, alert, oriented, in no acute distress      BODY HABITUS:    Normal body habitus     COMMUNICATION:    able to communicate normally, normal voice quality     HEAD:     Normocephalic, without obvious abnormality, atraumatic     FACE:    structure normal, no tenderness present, no lesions/masses, no evidence of trauma     SALIVARY GLANDS:    parotid glands with no tenderness, no swelling, no masses, submandibular glands with normal size, nontender      EYE:    ocular motility normal, eyelids normal, orbits normal, no proptosis, conjunctiva clear, sclera non-icteric, pupils equal, round, reactive to light and accomodation  Color:   blue     HEARING:      response to conversational voice normal with hearing aids     EARS:    Otoscopic exam    EXTERNAL AUDITORY CANALS:     normal ear canals without stenosis or significant cerumen, skin intact and normal shape   TYMPANIC MEMBRANES:     Abnormal tympanic membrane:        RIGHT: monomer posterior inferior, intact TM  LEFT: monomer poster inferior, TM intact   MIDDLE EAR:     Middle clear, no fluid, Ossicular chain  intact   MASTOID:     Non-surgical, Non-tender    PINNA:     normal, non-tender, skin intact without lesions      NOSE EXTERNAL:    APPEARANCE: normal, straight, with good projection, no tenderness, no lesions, no tenderness, good nasal support, patent nares     NOSE INTERNAL:    Anterior rhinoscopy   NASALMUCOSA:    abnormal:        Bilateral- bluish, boggy    NASAL PASSAGES:     abnormal   Bilateral- narrowed    NASAL VALVE:     abnormal  Bilateral- mildly weak    SEPTUM:     mucosa abnormal   Bilateral- bluish, boggy     midline   INFERIOR TURBINATES:     abnormal  Bilateral- bluish, boggy, enlarged      ORAL CAVITY:    Normal lips with no lesions, dentition normal for age, FOM intact without lesions and normal salivary flow, Mucosa intact without lesions, Hard and soft palate normal without lesions     OROPHARYNX:    Direct examination  oropharyngeal mucosa normal, tonsil(s) with normal appearance       NECK:    normal appearance, no masses, no lesions, larynx normal mobility, trachea midline   Neck position: lordotic     LYMPH NODES :    no adenopathy     THYROID:    no overt thyromegaly, no tenderness, nodules or mass present on palpation, position midline     CHEST/RESPIRATORY:    respiratory effort normal, no rales, rubs or wheezing, no stridor, normal appearance to chest     CARDIOVASCULAR:    regular rate and rhythm, no murmurs, gallups, no peripheral edema     NEURO/PSYCHIATRIC :    oriented appropriately for age, mood normal, affect appropriate, cranial nerves intact grossly (unless specifically described), gait normal for age    RESULTS REVIEW:    I have reviewed the patients old records in the chart.  I have personally reviewed the patient's ct scan of the neck with contrast images.  I have personally reviewed the patient's ct scan of the neck with contrast report.    CT neck 7/8/2020        ASSESSMENT:      Diagnosis Plan   1. Dysphonia      related to PND and cough   2. Monomeric tympanic membrane of  both ears      no change   3. GERD without esophagitis      related to cough   4. Sensorineural hearing loss (SNHL) of both ears     5. Laryngopharyngeal reflux (LPR)     6. Dry mouth     7. Presence of external hearing aid     8. Dysfunction of right eustachian tube     9. Maxillary sinusitis, chronic  CT Sinus Without Contrast    Seen on Neck CT           PLAN:      Medical and surgical options were discussed including observation, continued medical management, medication modification, surgical management and CT scanning. Risks, benefits and alternatives were discussed and questions were answered. After considering the options, the patient decided to proceed with continued medical management and CT scanning.  Patient has sinus findings on recent CT. I willget CT sinus based on prior CT and scope findings.  I will start antibiotics and steroids to see if sinusitis will clear. If not, I will disucss surgery with patient.  Reflux precautions  Pred 10 mg wean  Clinda x 10 days 150 mg  CT sinuses  MY CHART:  Patient is Encouraged to enroll in My Chart  Encouraged to review data and findings in My Chart  New Medications Ordered This Visit   Medications   • clindamycin (CLEOCIN) 150 MG capsule     Sig: Take 1 capsule by mouth 3 (Three) Times a Day.     Dispense:  30 capsule     Refill:  0   • predniSONE (DELTASONE) 10 MG tablet     Sig: Take 1 tablet by mouth 3 (Three) Times a Day for 3 days, THEN 1 tablet 2 (Two) Times a Day for 3 days, THEN 1 tablet Daily for 3 days.     Dispense:  21 tablet     Refill:  0     Planned taper     Orders Placed This Encounter   Procedures   • CT Sinus Without Contrast       Patient understand(s) and agree(s) with the treatment plan as described.    Return After CT sinus, for Recheck Sinusitis.     Elie Harvey Jr, MD  07/14/20  10:00

## 2020-07-14 NOTE — PATIENT INSTRUCTIONS
"Clindamycin 150 mg 3 times daily for 3 days  Prednisone 10 mg wean    NASAL SALINE:  Use 2 puffs each nostril 4-6 times daily and more frequently if possible.  You can buy saline spray or you can make your own and use an old spray bottle to administer  Use a humidifier at bedside  Recipe for saline:d  Water                                 1 quart  Salt (table)                        1 tablespoon  Gylcerin (or Bruna Syrup)    1 teaspoon  Sodium bicarbonate           1 teaspoon  Sprays or [place] pots are recommended    Nasal steroid use:  Using nasal steroids:  You will be prescribed one of the following nasal steroids: Flonase, Nasacort, Nasonex, Rhinocort, Qnasl, Zetonna  2 puffs each nostril 2 times daily  Start as soon as possible  If you are using Afrin for 3 days with the nasal steroid,  Use Afrin first and wait 10 minutes to allow the nose to open. Then administer nasal steroids.    THE PROBLEM OF ACID REFLUX    In BOTH children and adults, irritating acids may leak from the stomach and come up into the esophagus and throat. This can occur at any time, but occurs more commonly when lying down. When the acid touches the lining of the esophagus, there is irritation and muscle spasm, which can be felt up into the throat. Usually this is felt as \"heartburn\". When scarring of the esophagus occurs, the esophagus becomes less sensitive and the symptoms may only be in the throat.     Some of the symptoms that can occur with acid reflux into the throat include coughing, soreness, burning, hoarseness, throat clearing, excessive mucous, bad taste in the mouth, bad breath, a sensation of a lump in the throat, wheezing, post-nasal drip, choking spells, bleeding and nausea. In a small percentage of people, more serious problems may result, such as pneumonia, ulcers in the larynx (voice box), reduced mobility of the vocal cords and a pouch in the upper esophagus (diverticulum). In children, the symptoms may be different and " include persistent vomiting, bleeding from the esophagus, respiratory problems, pneumonia, asthma, choking spells, swallowing problems and anemia. In some cases, unexplained fussiness and crying is due to reflux.     The following instructions are designed to help neutralize the stomach acid, reduce the production of the acid, promote emptying of the acid from the stomach into the intestines and prevent acid from coming up into the esophagus. You should adopt enough of these changes to alleviate your symptoms. If carrying out these suggestions produces no change, it is imperative that you return so that we can discuss further the problem. More testing may be required, as might medication. It is important to realize that the esophagus takes time to heal, so you should not expect results immediately.    Diet  Most often, the throat symptoms above are due to dietary abuses. Certain foods are known to cause irritation, because they are acids themselves or cause excess production of stomach acid. These should be avoided, if possible. The following is a partial list of foods recognized as troublesome: coffee (even decaffeinated), tea chocolate, cola beverages, citrus beverages (such as 7-Up), caffeine containing beverages, alcohol, citrus fruits and juices, highly spiced foods, fatty foods, candies, nuts, mints, milk and milk products. Some of the worst offenders for promoting stomach acid are milk and beer.     Hard candies, gum, breath fresheners, throat lozenges, cough drops, mouthwashes, gargles, may actually irritate the throat directly (many cough drops and lozenges contain irritants such as oil of eucalyptus and menthol), and can also stimulate acid production directly.     Large amounts of liquid in the diet tend to promote reflux, because liquids tend to come back up more easily than solids.     Meals should be bland and consist of real food, trying to avoid recreational food. Multiple small meals, rather than  one or two large meals helps prevent reflux by not stretching the stomach and increasing the time needed for digestion, as well increasing the amount of acid needed to digest the meal. If you are overweight, losing weight is tremendously helpful.     YOU SHOULD NOT EAT FOR AT LEAST TWO HOURS BEFORE RETIRING AND YOU SHOULD REMAIN SITTING OR STANDING FOR AT LEAST 45 MINUTES AFTER EACH MEAL. This promotes passage of food from the stomach into the intestine.    Posture  Body weight is a significant factor in promoting reflux. Losing weight is beneficial. Pregnancy will markedly increase the symptoms of heartburn and throat pain. You should avoid clothing that fits tightly across the mid-section, as this promotes squeezing of the abdominal contents upward. It is helpful to practice abdominal or diaphragmatic breathing, using the stomach to push out each breath rather than chest expansion. Avoid slumping while sitting, and avoid stooping or straining.     For many, the reflux occurs at night while sleeping. This is the time acid production is the greatest and you are lying down, a position that promotes reflux, thus setting up the irritation that occurs the next morning. One of the most important things you can do is to elevate the head of the bed, in an effort to use gravity to keep the acid in the stomach. This is accomplished by placed blocks or bricks beneath the legs at the head of the bed, raising the head 6-10 inches. Do not make the head so high that you slide down. Pillows are not effective because they cause the body to curl, increasing abdominal pressure and it is difficult to remain upright on them. Additionally, pillows promote sleeping on the back, but it is far more desirable to sleep on the right side or on the stomach, as this allows gas to escape, while preventing the escape of acid material. Children and infants, who are refluxing, should sleep on their stomachs.  Exercise  Regular exercise is extremely  beneficial in promoting good digestion and regularity. The abdominal muscles are toned, which aids in controlling acid problems. However, it is recommended that you not participate in vigorous activity immediately after eating. This causes pressure on an already full stomach, forcing acid up into the esophagus. Regular exercise is encouraged, prior to meals, or two hours after meals.  Antacids  Antacids should be used regularly, as they neutralize excess acid. Gelusil II, Mylanta II, Tums and Rolaids are popular brands. Gaviscon is not an antacid, but floats on top of the stomach acid, preventing esophageal irritation. Care should be used in administering large doses of antacids, especially in children. Many antacid preparations contain magnesium, which promotes frequent bowel movements, while antacids that contain aluminum can be constipating. Tums have a high calcium content that can be used to some advantage in older women with reflux.     Antacid tablets are convenient, but the liquid form tends to work much more quickly. Also remember to check with your physician about interference with other medications. Antacids can slow the absorption of digitalis, Indocin, tetracyclines, fluorides and isoniazid from the intestines. Many medications require the presence of stomach acid to be absorbed.     Initially, antacids should be used 30 minutes after each meal, 2 hours after each meal and at bedtime. This maximizes the neutralization of the stomach acid. Antacids can be used more frequently if symptoms persist, but such extensive use needs to be reviewed with your physician.  Prescription Medications  If the above recommendations are not effectively resolving the symptoms, medications may be prescribed. These are usually in the form of acid production blockers, such as Tagamet, Zantac, Pepcid, or Axid or acid pump inhibitors like Prevacid, Nexium, Protonix or Prilosec. These drugs help stop acid production in the  stomach. Medications such as Reglan can be used to promote stomach emptying.  Medications That Promote Reflux  Certain medications can promote acid reflux; either by relaxing the sphincter muscle that helps keeps stomach acid down, or increasing the acid production. These include progesterone (Provera, Ortho Novum, Ovral, other birth control pills), theophylline (Marcin-Dur, etc.), anticholinergic (Donnatal, Scopolamine, Probanthine, Bentil, others), beta blockers (Inderal, Tenormin and Corgard), alpha blockers (Dibenzyline), dopamine, calcium channel blockers (Procardia, Cardizem, Isotin, Calan), aspirin, non-steroidal anti-inflammatory drugs (Motrin, Advil, Nuprin, Nalfon, Rufen, Indocin, Feldene, Clinoril and Tolectin). Vitamin C is an acid and can promote reflux. This is only a partial list and before stopping any prescription medication, you should check with your physician.    Goal  The goal is to reduce the symptoms with the least number of lifestyle changes. A combination of the above suggestions is frequently prescribed to return you to normal as quickly as possible. However, this problem did not develop overnight and will not disappear rapidly. Therefore, developing a regimen will aid in controlling symptoms and keep you symptom free for a long period of time. Other alternatives exist, should these suggestions fail, and can be discussed with your physician.     To Summarize:  Watch your diet, avoid foods that cause acid reflux  Lose weight  Avoid tight fitting clothes  Adjust your posture, raise the head of the bed  Exercise regularly  Use antacids  Use prescription medication as directed  Avoid medications that promote reflux  Avoid behavior and eating habits that promote reflux of stomach acid     You are welcome to do a Google search on the topic of reflux and reflux diets. The internet has many useful resources.     Please remember, your success in controlling this condition depends on many factors  including diet, exercise, medications and follow up. All are important and must be utilized to achieve success.     CT scan sinuses    Thank you for enrolling in [facility]. Please follow the instructions below to securely access your online medical record. [facility] allows you to send messages to your doctor, view your test results, renew your prescriptions, schedule appointments, and more.    How Do I Sign Up?  1. In your Internet browser, go to [email or web address]  2. Click on the Sign Up Now link in the New User? box.   3. Enter your [facility] Activation Code exactly as it appears below. You will not need to use this code after you have completed the sign-up process. If you do not sign up before the expiration date, you must request a new code.  [facility] Activation Code: [email or web address]  Expires: 8/13/2020  9:18 AM    4. Enter the last four digits of your Social Security Number and your Date of Birth as indicated and click Next. You will be taken to the next sign-up page.  5. Create a [facility] username. Think of one that is secure and easy to remember.  6. Create a [facility] password. You can change your password at any time.  7. Choose a security question, enter your answer, and click Next. This can be used to access [facility] if you forget your password.   8. Select your communication preference. Enter a valid e-mail address if you would like to receive e-mail notifications when new information is available in [facility].  9. Click Sign In. You can now view your medical record.     Additional Information  If you have questions, you can e-mail [email or web address], or call [phone] to talk to our [facility] staff. Remember, [facility] is NOT to be used for urgent needs. For medical emergencies, dial 911.     Regular rate & rhythm, normal S1, S2; no murmurs, gallops or rubs; no S3, S4

## 2020-07-14 NOTE — PROGRESS NOTES
Elie Harvey Jr, MD   ENT PROCEDURE NOTE:  Anesthesia:   topical 4% tetracaine and oxymetazoline mix    Endoscopy Type:   Flexible Laryngoscopy    Indications for Procedure:   1. Dysphonia    2. Monomeric tympanic membrane of both ears    3. GERD without esophagitis    4. Sensorineural hearing loss (SNHL) of both ears    5. Laryngopharyngeal reflux (LPR)    6. Dry mouth    7. Presence of external hearing aid    8. Dysfunction of right eustachian tube    9. Maxillary sinusitis, chronic         Procedure Details:    The patient was placed in an upright position.  The laryngoscope was passed bilateral nasal passge.  The nasal cavities, nasopharynx, oropharynx, hypopharynx, and larynx were all examined.  Vocal cords were examined during respiration and phonation.  The following findings were noted:    Findings:   NASAL SCOPE FINDINGS:    Nasal endoscopy   NASAL PASSAGES:     abnormal   Bilateral- red, thick    NASAL VALVE:     abnormal  Bilateral- weak    SEPTUM:     mucosa abnormal   Bilateral- red     deviation present:      deviated Right low moderate   INFERIOR TURBINATES:     abnormal  Bilateral- red, boggy    MIDDLE TURBINATES:     abnormal:        BILATERAL: red, boggy   MIDDLE MEATUS:       findings       LEFT: thick mucopus present  RIGHT: clear, no obstruction   SPHENO-ETHMOID RECESS:    normal, normal sphenoid ostia. no mucopus, non-surgical   NASOPHARYNX:     Adenoids:  thick with L sided thick secretions     Eustachian tubes:        BILATERAL: red, thick, L sided secretions over tubarus    Palate: normal   SECRETIONS:     abnormal Left- thick, green, Right- thick clear  LARYNGOSCOPY FINDINGS :    OROPHARYNX:     Lateral walls:         Redundant          BILATERAL    Posterior walls:        thick, red    BASEOF TONGUE:          prolapse  severe    LINGUAL TONSILS:          BILATERAL: protuberant    VALLECULA:         normal, no lesions, no pooling  Bilateral  EPIGLOTTIS:    Position: retroverted     Color: red    Mucosa: thick    Shape: normal  HYPOPHARYNX:    Lateral walls:         BILATERAL: thick, redundant    Posterior wall:        thick, red  ARYEPIGLOTTIC FOLDS:     Abnormal:        BILATERAL: thick, red  ARYTENOIDS:     Mobility:       Normal   Bilateral    Rangely size:         BILATERAL: enlarged  FALSE CORDS:     mucosa:         BILATERAL: thick, red    mobility:         BILATERAL: normal  TRUE VOCAL FOLDS:      appearance:         BILATERAL: thickened    mobility        BILATERAL: normal    mucosa:        BILATERAL: mild edema  GLOTTIS:     normal closure, with good cord apposition  SUBGLOTTIS:     unobstructed, patent, no lesions    Condition:  Stable.  Patient tolerated procedure well.    Complications:  None    Post-procedure instructions reviewed with Patient  Instructions documented in AVS for patient to review

## 2020-07-14 NOTE — PROGRESS NOTES
Kasie Byrne LPN   Patient Intake Note    Review of Systems  Review of Systems   Constitutional: Negative for appetite change, fatigue and fever.   HENT:        See HPI   Respiratory: Negative for cough, choking and shortness of breath.    Gastrointestinal: Negative for diarrhea, nausea and vomiting.   Skin: Negative for rash.   Neurological: Negative for dizziness, light-headedness and headaches.   Psychiatric/Behavioral: Negative for sleep disturbance.       Tobacco Use: Screening and Cessation Intervention  Social History    Tobacco Use      Smoking status: Never Smoker      Smokeless tobacco: Never Used        Kasie Byrne LPN  7/14/2020  09:30

## 2020-07-22 ENCOUNTER — HOSPITAL ENCOUNTER (OUTPATIENT)
Dept: CT IMAGING | Facility: HOSPITAL | Age: 65
Discharge: HOME | End: 2020-07-22
Admitting: OTOLARYNGOLOGY

## 2020-07-22 DIAGNOSIS — J32.0 MAXILLARY SINUSITIS, CHRONIC: ICD-10-CM

## 2020-07-22 PROCEDURE — 70486 CT MAXILLOFACIAL W/O DYE: CPT

## 2020-08-05 ENCOUNTER — OFFICE VISIT (OUTPATIENT)
Dept: OTOLARYNGOLOGY | Facility: CLINIC | Age: 65
End: 2020-08-05

## 2020-08-05 VITALS
DIASTOLIC BLOOD PRESSURE: 79 MMHG | WEIGHT: 198 LBS | HEIGHT: 66 IN | BODY MASS INDEX: 31.82 KG/M2 | TEMPERATURE: 97.8 F | HEART RATE: 86 BPM | SYSTOLIC BLOOD PRESSURE: 131 MMHG

## 2020-08-05 DIAGNOSIS — H72.93: ICD-10-CM

## 2020-08-05 DIAGNOSIS — K21.9 GERD WITHOUT ESOPHAGITIS: ICD-10-CM

## 2020-08-05 DIAGNOSIS — J32.4 CHRONIC PANSINUSITIS: ICD-10-CM

## 2020-08-05 DIAGNOSIS — H69.81 DYSFUNCTION OF RIGHT EUSTACHIAN TUBE: ICD-10-CM

## 2020-08-05 DIAGNOSIS — K21.9 LARYNGOPHARYNGEAL REFLUX (LPR): ICD-10-CM

## 2020-08-05 DIAGNOSIS — H90.3 SENSORINEURAL HEARING LOSS (SNHL) OF BOTH EARS: ICD-10-CM

## 2020-08-05 DIAGNOSIS — R49.0 DYSPHONIA: Primary | ICD-10-CM

## 2020-08-05 DIAGNOSIS — R68.2 DRY MOUTH: ICD-10-CM

## 2020-08-05 DIAGNOSIS — J34.3 NASAL TURBINATE HYPERTROPHY: ICD-10-CM

## 2020-08-05 PROCEDURE — 99213 OFFICE O/P EST LOW 20 MIN: CPT | Performed by: OTOLARYNGOLOGY

## 2020-08-05 RX ORDER — PREDNISONE 10 MG/1
10 TABLET ORAL DAILY
Qty: 30 TABLET | Refills: 1 | Status: ON HOLD | OUTPATIENT
Start: 2020-08-05 | End: 2021-02-11

## 2020-08-05 NOTE — PATIENT INSTRUCTIONS
"VOICE HYGIENE:  Stop clearing throat!!!  Avoid singing (especially falsetto)  Talk in a normal voice and tone, Do not raise voice  Discontinue speaking if voice worsens, you have more breaks in voice or it hurts to Speak    NASAL SALINE:  Use 2 puffs each nostril 4-6 times daily and more frequently if possible.  You can buy saline spray or you can make your own and use an old spray bottle to administer  Use a humidifier at bedside  Recipe for saline:d  Water                                 1 quart  Salt (table)                        1 tablespoon  Gylcerin (or Bruna Syrup)    1 teaspoon  Sodium bicarbonate           1 teaspoon  Sprays or Alison pots are recommended    Nasal steroid use:  Using nasal steroids:  You will be prescribed one of the following nasal steroids: Flonase, Nasacort, Nasonex, Rhinocort, Qnasl, Zetonna  2 puffs each nostril 2 times daily  Start as soon as possible  If you are using Afrin for 3 days with the nasal steroid,  Use Afrin first and wait 10 minutes to allow the nose to open. Then administer nasal steroids.    Prednisone 10 mg daily for 30 days    THE PROBLEM OF ACID REFLUX    In BOTH children and adults, irritating acids may leak from the stomach and come up into the esophagus and throat. This can occur at any time, but occurs more commonly when lying down. When the acid touches the lining of the esophagus, there is irritation and muscle spasm, which can be felt up into the throat. Usually this is felt as \"heartburn\". When scarring of the esophagus occurs, the esophagus becomes less sensitive and the symptoms may only be in the throat.     Some of the symptoms that can occur with acid reflux into the throat include coughing, soreness, burning, hoarseness, throat clearing, excessive mucous, bad taste in the mouth, bad breath, a sensation of a lump in the throat, wheezing, post-nasal drip, choking spells, bleeding and nausea. In a small percentage of people, more serious problems may " result, such as pneumonia, ulcers in the larynx (voice box), reduced mobility of the vocal cords and a pouch in the upper esophagus (diverticulum). In children, the symptoms may be different and include persistent vomiting, bleeding from the esophagus, respiratory problems, pneumonia, asthma, choking spells, swallowing problems and anemia. In some cases, unexplained fussiness and crying is due to reflux.     The following instructions are designed to help neutralize the stomach acid, reduce the production of the acid, promote emptying of the acid from the stomach into the intestines and prevent acid from coming up into the esophagus. You should adopt enough of these changes to alleviate your symptoms. If carrying out these suggestions produces no change, it is imperative that you return so that we can discuss further the problem. More testing may be required, as might medication. It is important to realize that the esophagus takes time to heal, so you should not expect results immediately.    Diet  Most often, the throat symptoms above are due to dietary abuses. Certain foods are known to cause irritation, because they are acids themselves or cause excess production of stomach acid. These should be avoided, if possible. The following is a partial list of foods recognized as troublesome: coffee (even decaffeinated), tea chocolate, cola beverages, citrus beverages (such as 7-Up), caffeine containing beverages, alcohol, citrus fruits and juices, highly spiced foods, fatty foods, candies, nuts, mints, milk and milk products. Some of the worst offenders for promoting stomach acid are milk and beer.     Hard candies, gum, breath fresheners, throat lozenges, cough drops, mouthwashes, gargles, may actually irritate the throat directly (many cough drops and lozenges contain irritants such as oil of eucalyptus and menthol), and can also stimulate acid production directly.     Large amounts of liquid in the diet tend to  promote reflux, because liquids tend to come back up more easily than solids.     Meals should be bland and consist of real food, trying to avoid recreational food. Multiple small meals, rather than one or two large meals helps prevent reflux by not stretching the stomach and increasing the time needed for digestion, as well increasing the amount of acid needed to digest the meal. If you are overweight, losing weight is tremendously helpful.     YOU SHOULD NOT EAT FOR AT LEAST TWO HOURS BEFORE RETIRING AND YOU SHOULD REMAIN SITTING OR STANDING FOR AT LEAST 45 MINUTES AFTER EACH MEAL. This promotes passage of food from the stomach into the intestine.    Posture  Body weight is a significant factor in promoting reflux. Losing weight is beneficial. Pregnancy will markedly increase the symptoms of heartburn and throat pain. You should avoid clothing that fits tightly across the mid-section, as this promotes squeezing of the abdominal contents upward. It is helpful to practice abdominal or diaphragmatic breathing, using the stomach to push out each breath rather than chest expansion. Avoid slumping while sitting, and avoid stooping or straining.     For many, the reflux occurs at night while sleeping. This is the time acid production is the greatest and you are lying down, a position that promotes reflux, thus setting up the irritation that occurs the next morning. One of the most important things you can do is to elevate the head of the bed, in an effort to use gravity to keep the acid in the stomach. This is accomplished by placed blocks or bricks beneath the legs at the head of the bed, raising the head 6-10 inches. Do not make the head so high that you slide down. Pillows are not effective because they cause the body to curl, increasing abdominal pressure and it is difficult to remain upright on them. Additionally, pillows promote sleeping on the back, but it is far more desirable to sleep on the right side or on the  stomach, as this allows gas to escape, while preventing the escape of acid material. Children and infants, who are refluxing, should sleep on their stomachs.  Exercise  Regular exercise is extremely beneficial in promoting good digestion and regularity. The abdominal muscles are toned, which aids in controlling acid problems. However, it is recommended that you not participate in vigorous activity immediately after eating. This causes pressure on an already full stomach, forcing acid up into the esophagus. Regular exercise is encouraged, prior to meals, or two hours after meals.  Antacids  Antacids should be used regularly, as they neutralize excess acid. Gelusil II, Mylanta II, Tums and Rolaids are popular brands. Gaviscon is not an antacid, but floats on top of the stomach acid, preventing esophageal irritation. Care should be used in administering large doses of antacids, especially in children. Many antacid preparations contain magnesium, which promotes frequent bowel movements, while antacids that contain aluminum can be constipating. Tums have a high calcium content that can be used to some advantage in older women with reflux.     Antacid tablets are convenient, but the liquid form tends to work much more quickly. Also remember to check with your physician about interference with other medications. Antacids can slow the absorption of digitalis, Indocin, tetracyclines, fluorides and isoniazid from the intestines. Many medications require the presence of stomach acid to be absorbed.     Initially, antacids should be used 30 minutes after each meal, 2 hours after each meal and at bedtime. This maximizes the neutralization of the stomach acid. Antacids can be used more frequently if symptoms persist, but such extensive use needs to be reviewed with your physician.  Prescription Medications  If the above recommendations are not effectively resolving the symptoms, medications may be prescribed. These are usually in  the form of acid production blockers, such as Tagamet, Zantac, Pepcid, or Axid or acid pump inhibitors like Prevacid, Nexium, Protonix or Prilosec. These drugs help stop acid production in the stomach. Medications such as Reglan can be used to promote stomach emptying.  Medications That Promote Reflux  Certain medications can promote acid reflux; either by relaxing the sphincter muscle that helps keeps stomach acid down, or increasing the acid production. These include progesterone (Provera, Ortho Novum, Ovral, other birth control pills), theophylline (Marcin-Dur, etc.), anticholinergic (Donnatal, Scopolamine, Probanthine, Bentil, others), beta blockers (Inderal, Tenormin and Corgard), alpha blockers (Dibenzyline), dopamine, calcium channel blockers (Procardia, Cardizem, Isotin, Calan), aspirin, non-steroidal anti-inflammatory drugs (Motrin, Advil, Nuprin, Nalfon, Rufen, Indocin, Feldene, Clinoril and Tolectin). Vitamin C is an acid and can promote reflux. This is only a partial list and before stopping any prescription medication, you should check with your physician.    Goal  The goal is to reduce the symptoms with the least number of lifestyle changes. A combination of the above suggestions is frequently prescribed to return you to normal as quickly as possible. However, this problem did not develop overnight and will not disappear rapidly. Therefore, developing a regimen will aid in controlling symptoms and keep you symptom free for a long period of time. Other alternatives exist, should these suggestions fail, and can be discussed with your physician.     To Summarize:  Watch your diet, avoid foods that cause acid reflux  Lose weight  Avoid tight fitting clothes  Adjust your posture, raise the head of the bed  Exercise regularly  Use antacids  Use prescription medication as directed  Avoid medications that promote reflux  Avoid behavior and eating habits that promote reflux of stomach acid     You are welcome to  do a Google search on the topic of reflux and reflux diets. The internet has many useful resources.     Please remember, your success in controlling this condition depends on many factors including diet, exercise, medications and follow up. All are important and must be utilized to achieve success.     Thank you for enrolling in [facility]. Please follow the instructions below to securely access your online medical record. [facility] allows you to send messages to your doctor, view your test results, renew your prescriptions, schedule appointments, and more.    How Do I Sign Up?  1. In your Internet browser, go to [employer]  2. Click on the Sign Up Now link in the New User? box.   3. Enter your [facility] Activation Code exactly as it appears below. You will not need to use this code after you have completed the sign-up process. If you do not sign up before the expiration date, you must request a new code.  [facility] Activation Code: [email or web address]  Expires: 9/4/2020  9:57 AM    4. Enter the last four digits of your Social Security Number and your Date of Birth as indicated and click Next. You will be taken to the next sign-up page.  5. Create a [facility] username. Think of one that is secure and easy to remember.  6. Create a [facility] password. You can change your password at any time.  7. Choose a security question, enter your answer, and click Next. This can be used to access [facility] if you forget your password.   8. Select your communication preference. Enter a valid e-mail address if you would like to receive e-mail notifications when new information is available in [facility].  9. Click Sign In. You can now view your medical record.     Additional Information  If you have questions, you can e-mail [email or web address], or call [phone] to talk to our [facility] staff. Remember, [facility] is NOT to be used for urgent needs. For medical emergencies, dial 911.

## 2020-08-05 NOTE — PROGRESS NOTES
Elie Harvey Jr, MD     ENT FOLLOW UP NOTE     Chief Complaint   Patient presents with   • Follow-up     CT results        HISTORY OF PRESENT ILLNESS:  Accompanied by:  No one  Tawny Shin is a  65 y.o. female who is here for follow up. She says voice and nose is much better.  Coughing is much better.  She does not wish surgery.  She has dry mouth michele in AM. She is on diuretics  Anticoagulated    Review of Systems   Constitutional: Negative for appetite change, fatigue and fever.   HENT:        See HPI   Respiratory: Negative for cough, choking and shortness of breath.    Gastrointestinal: Negative for diarrhea, nausea and vomiting.   Skin: Negative for rash.   Neurological: Negative for dizziness, light-headedness and headaches.   Psychiatric/Behavioral: Negative for sleep disturbance.       Past History:  Past medical and surgical history, family history and social history reviewed and updated when appropriate.  Current medications and allergies reviewed and updated when appropriate.  Allergies:  Atorvastatin; Clindamycin/lincomycin; Escitalopram; Amoxicillin; Nabumetone; Niacin er; Penicillins; and Simvastatin        Vital Signs:   Temp:  [97.8 °F (36.6 °C)] 97.8 °F (36.6 °C)  Heart Rate:  [86] 86  BP: (131)/(79) 131/79  EXAMINATION:  CONSTITUTIONAL:    well nourished, well-developed, alert, oriented, in no acute distress      BODY HABITUS:    Normal body habitus     COMMUNICATION:    able to communicate normally, normal voice quality     HEAD:     Normocephalic, without obvious abnormality, atraumatic     FACE:    structure normal, no tenderness present, no lesions/masses, no evidence of trauma     SALIVARY GLANDS:    parotid glands with no tenderness, no swelling, no masses, submandibular glands with normal size, nontender      EYE:    ocular motility normal, eyelids normal, orbits normal, no proptosis, conjunctiva clear, sclera non-icteric, pupils equal, round, reactive to light and  accomodation  Color:   blue     HEARING:      response to conversational voice normal with hearing aids     EARS:    Otoscopic exam    EXTERNAL AUDITORY CANALS:     normal ear canals without stenosis or significant cerumen, skin intact and normal shape   TYMPANIC MEMBRANES:     Abnormal tympanic membrane:        RIGHT: monomer posterior inferior, intact TM  LEFT: monomer poster inferior, TM intact   MIDDLE EAR:     Middle clear, no fluid, Ossicular chain intact   MASTOID:     Non-surgical, Non-tender    PINNA:     normal, non-tender, skin intact without lesions      NOSE EXTERNAL:    APPEARANCE: normal, straight, with good projection, no tenderness, no lesions, no tenderness, good nasal support, patent nares     NOSE INTERNAL:    Anterior rhinoscopy   NASALMUCOSA:    abnormal:        Bilateral- bluish, boggy    NASAL PASSAGES:     abnormal   Bilateral- narrowed    NASAL VALVE:     abnormal  Bilateral- mildly weak    SEPTUM:     mucosa abnormal   Bilateral- bluish, boggy     midline   INFERIOR TURBINATES:     abnormal  Bilateral- bluish, boggy, enlarged      ORAL CAVITY:    Normal lips with no lesions, dentition normal for age, FOM intact without lesions and normal salivary flow, Mucosa intact without lesions, Hard and soft palate normal without lesions     OROPHARYNX:    Direct examination  oropharyngeal mucosa normal, tonsil(s) with normal appearance       NECK:    normal appearance, no masses, no lesions, larynx normal mobility, trachea midline   Neck position: lordotic     LYMPH NODES :    no adenopathy     THYROID:    no overt thyromegaly, no tenderness, nodules or mass present on palpation, position midline     CHEST/RESPIRATORY:    respiratory effort normal, no rales, rubs or wheezing, no stridor, normal appearance to chest     CARDIOVASCULAR:    regular rate and rhythm, no murmurs, gallups, no peripheral edema     NEURO/PSYCHIATRIC :    oriented appropriately for age, mood normal, affect appropriate, cranial  nerves intact grossly (unless specifically described), gait normal for age    RESULTS REVIEW:    I have reviewed the patients old records in the chart.  I have personally reviewed the patient's ct scan of the sinuses without contrast images.  I have personally reviewed the patient's ct scan of the sinuses without contrast report.    CT sinus 7/22/2020  {Snap[employer]ot  Notes  Encounters  Result Review  Labs  Imaging  Meds  Media :23}      ASSESSMENT:      Diagnosis Plan   1. Dysphonia      improved   2. Monomeric tympanic membrane of both ears     3. GERD without esophagitis      Stable   4. Sensorineural hearing loss (SNHL) of both ears     5. Laryngopharyngeal reflux (LPR)     6. Dry mouth     7. Dysfunction of right eustachian tube     8. Chronic pansinusitis      by CT  Clinically improving   9. Nasal turbinate hypertrophy      Bilateral ITs AND MTs           PLAN:      Conservative management.  Patient has extensive sinus disease. I discussed surgical option with patient. She says she is feeling much better. I will plan slow wean of steroids, follow symptoms and re-CT sinuses in 3-6 months to evaluate resolution. She does respond well to steroids.  Pred QD for 30 days, then wean slowly  Flonase Bid  Nasal saline  Calcium Supplements  MY CHART:  Patient is Encouraged to enroll in My Chart  Encouraged to review data and findings in My Chart  New Medications Ordered This Visit   Medications   • predniSONE (DELTASONE) 10 MG tablet     Sig: Take 1 tablet by mouth Daily.     Dispense:  30 tablet     Refill:  1          Patient understand(s) and agree(s) with the treatment plan as described.    Return in about 1 month (around 9/5/2020) for Recheck Sinus and voice, wean steroids.     Elie Harvey Jr, MD  08/05/20  10:22

## 2020-08-12 ENCOUNTER — OFFICE VISIT (OUTPATIENT)
Dept: CARDIOLOGY | Facility: CLINIC | Age: 65
End: 2020-08-12

## 2020-08-12 VITALS
BODY MASS INDEX: 32.3 KG/M2 | WEIGHT: 201 LBS | SYSTOLIC BLOOD PRESSURE: 110 MMHG | HEIGHT: 66 IN | DIASTOLIC BLOOD PRESSURE: 60 MMHG | HEART RATE: 65 BPM

## 2020-08-12 DIAGNOSIS — I25.10 CORONARY ARTERY DISEASE INVOLVING NATIVE CORONARY ARTERY OF NATIVE HEART WITHOUT ANGINA PECTORIS: Primary | ICD-10-CM

## 2020-08-12 DIAGNOSIS — I49.5 SICK SINUS SYNDROME (HCC): ICD-10-CM

## 2020-08-12 DIAGNOSIS — R55 VASOVAGAL SYNCOPE: ICD-10-CM

## 2020-08-12 DIAGNOSIS — E78.2 MIXED HYPERLIPIDEMIA: ICD-10-CM

## 2020-08-12 DIAGNOSIS — I10 ESSENTIAL HYPERTENSION: ICD-10-CM

## 2020-08-12 DIAGNOSIS — Z95.0 PACEMAKER: ICD-10-CM

## 2020-08-12 PROCEDURE — 99214 OFFICE O/P EST MOD 30 MIN: CPT | Performed by: INTERNAL MEDICINE

## 2020-08-12 PROCEDURE — 93000 ELECTROCARDIOGRAM COMPLETE: CPT | Performed by: INTERNAL MEDICINE

## 2020-08-12 NOTE — PROGRESS NOTES
"     Subjective:     Encounter Date:08/12/2020      Patient ID: Tawny Shin is a 65 y.o. female.    Chief Complaint: Routine follow-up    History of Present Illness  65 with known hx of CAD (RCA PCI in '10, followed by NSTEMI requiring LAD PCI in '13.  Most recent cath, done 9/3/19 by Dr. Melchor, reported patent stents and no other obstructive CAD), hyperlipidemia, RA, and HTN who has been followed at [facility] for several years before transferring her care to me in December 2019.  At that point time, she was no longer having any chest pain after her Imdur dose had been increased up to 60 mg daily prior to that visit.  She was taking aspirin and Brilinta 90 mg twice daily.  I also advised that she start taking Repatha and could stop taking Zetia, as she did have intolerance to statin therapy.  Per her request, I also agreed to let her switch from Brilinta to Plavix when she ran out of her current bottle of Brilinta.  She returns today for routine follow-up, with update on chronic conditions:    CAD: recently having some substernal pain that is short-lived with no identified aggravating/alleviating factors; she says ENT thinks she has reflux and she wonders if this might be her issue.  She does say this chest pain is distinctly different from the pain she was having prior to first seeing me (the pain that improved with Imdur up-titration).  She has not taken any NTG for the currently described pain.    Hypertension: well controlled    Hyperlipidemia: stable    Sick sinus syndrome status post pacemaker: patient states she pacemaker was placed for syncopal episodes.  Has had syncope recently while getting shots at Dr. Chaidez, and one other time when sitting on cough but worsened when she stood up. Recent pacemaker interrogation showed no arrhythmias at times of these events. She feels a \"warning\" for several seconds, described as \"warm feeling coming from the floor up.\" She denies tachy-palpitations.    The " following portions of the patient's history were reviewed and updated as appropriate: allergies, current medications, past family history, past medical history, past social history, past surgical history and problem list.    Review of Systems   Constitution: Negative for malaise/fatigue.   Cardiovascular: Positive for chest pain and syncope. Negative for claudication, dyspnea on exertion, leg swelling, near-syncope, orthopnea, palpitations and paroxysmal nocturnal dyspnea.   Respiratory: Negative for shortness of breath.    Hematologic/Lymphatic: Does not bruise/bleed easily.         Current Outpatient Medications:   •  aspirin 81 MG EC tablet, Take 81 mg by mouth Daily., Disp: , Rfl:   •  carvedilol (COREG) 12.5 MG tablet, Take 12.5 mg by mouth 2 (Two) Times a Day With Meals., Disp: , Rfl:   •  Certolizumab Pegol (Cimzia) 2 X 200 MG kit, Inject 400 mg under the skin into the appropriate area as directed Every 28 (Twenty-Eight) Days., Disp: , Rfl:   •  CloNIDine (CATAPRES) 0.1 MG tablet, Take 0.2 mg by mouth Every Night., Disp: , Rfl:   •  Evolocumab (REPATHA) 140 MG/ML solution prefilled syringe, Inject  under the skin into the appropriate area as directed., Disp: , Rfl:   •  fluticasone (FLOVENT DISKUS) 50 MCG/BLIST diskus inhaler, Inhale 1 puff 2 (Two) Times a Day., Disp: , Rfl:   •  folic acid (FOLVITE) 1 MG tablet, TAKE 1 TABLET BY MOUTH DAILY, Disp: 90 tablet, Rfl: 3  •  furosemide (LASIX) 40 MG tablet, Take 40 mg by mouth daily., Disp: , Rfl:   •  guaiFENesin (MUCINEX) 600 MG 12 hr tablet, Take  by mouth., Disp: , Rfl:   •  isosorbide mononitrate (IMDUR) 60 MG 24 hr tablet, Take 1 tablet by mouth 2 (Two) Times a Day., Disp: 180 tablet, Rfl: 4  •  leflunomide (ARAVA) 20 MG tablet, Take 20 mg by mouth Daily., Disp: , Rfl:   •  levothyroxine (SYNTHROID, LEVOTHROID) 25 MCG tablet, Take 25 mcg by mouth Daily., Disp: , Rfl:   •  MULTIPLE VITAMIN PO, Take 1 tablet by mouth daily.  , Disp: , Rfl:   •  nitroglycerin  (NITROSTAT) 0.4 MG SL tablet, Place 1 tablet under the tongue every 5 minutes as needed for Chest pain, Disp: , Rfl:   •  pantoprazole (PROTONIX) 40 MG EC tablet, TAKE 1 TABLET BY MOUTH DAILY, Disp: 90 tablet, Rfl: 3  •  predniSONE (DELTASONE) 10 MG tablet, Take 1 tablet by mouth Daily., Disp: 30 tablet, Rfl: 1  •  salsalate (DISALCID) 500 MG tablet, Take 5 tablets on Monday, Wednesday, Friday, Saturday. Take 4 tablets on the other days, Disp: 128 tablet, Rfl: 5  •  spironolactone (ALDACTONE) 25 MG tablet, TAKE 1 TABLET BY MOUTH DAILY, Disp: 90 tablet, Rfl: 3  •  ticagrelor (BRILINTA) 90 MG tablet tablet, Take 90 mg by mouth 2 (Two) Times a Day., Disp: , Rfl:   •  traMADol-acetaminophen (ULTRACET) 37.5-325 MG per tablet, Take 1 tablet by mouth 2 (Two) Times a Day As Needed for Moderate Pain ., Disp: 120 tablet, Rfl: 2  •  valACYclovir (VALTREX) 500 MG tablet, Take 1 tablet by mouth Daily., Disp: 30 tablet, Rfl: 0  •  vitamin D (ERGOCALCIFEROL) 79744 units capsule capsule, Take 50,000 Units by mouth Every 7 (Seven) Days., Disp: , Rfl:        Objective:      Vitals:    08/12/20 1309   BP: 110/60   Pulse: 65     Physical Exam   Constitutional: She is oriented to person, place, and time. She appears well-developed and well-nourished.   Neck: No JVD present.   Cardiovascular: Normal rate, regular rhythm, normal heart sounds and intact distal pulses.   No murmur heard.  Pulmonary/Chest: Effort normal and breath sounds normal.   Musculoskeletal: She exhibits no edema.   Neurological: She is alert and oriented to person, place, and time.   Skin: Skin is warm and dry.       Lab Review:         ECG 12 Lead  Date/Time: 8/12/2020 1:36 PM  Performed by: Omar Hernandez MD  Authorized by: Omar Hernandez MD   Comparison: compared with previous ECG from 12/27/2019  Similar to previous ECG  Rhythm: paced  Rhythm comments: electronic atrial pacemaker  BPM: 65  Conduction: left anterior fascicular block  Other findings: poor R wave  progression    Clinical impression: abnormal EKG            Last ppm interrogation reviewed: several short runs of high atrial rates, none >1 minute    Labs reviewed:      Assessment/Plan:     Problem List Items Addressed This Visit        Cardiovascular and Mediastinum    Coronary artery disease - Primary    Overview     PCI RCA '10, NSTEMI requiring LAD PCI in '13    [facility] 9/[facility]/19 at [facility]: no significant dz; stents patent         Hyperlipidemia: remarkable improvement on repatha    Hypertension: well controlled    Pacemaker: functioning appropriately    Sick sinus syndrome (CMS/HCC): stable        Vasovagal syncope     Recommendations/plans:    No changes in therapy recommended - can stop brilinta when runs out and go back to plavix as I see no current indication for Brilinta (no recent PCI or ACS event)  Continue aspirin indefinitely   Continue current dose imdur to maintain anti-anginal benefit  Continue Repatha to maintain goal LDL <70  Continue current anti-hypertensives (coreg 12.5mg po bid, aldactone 25,   Counseled on maneuvers to prevent syncope with vasovagal syncope (ie put head down, legs up, etc)    F/u 6 months    Omar Hernandez MD  08/12/2020  13:40

## 2020-08-12 NOTE — PATIENT INSTRUCTIONS
Heart-Healthy Eating Plan  Many factors influence your heart (coronary) health, including eating and exercise habits. Coronary risk increases with abnormal blood fat (lipid) levels. Heart-healthy meal planning includes limiting unhealthy fats, increasing healthy fats, and making other diet and lifestyle changes.  What is my plan?  Your health care provider may recommend that you:  · Limit your fat intake to _________% or less of your total calories each day.  · Limit your saturated fat intake to _________% or less of your total calories each day.  · Limit the amount of cholesterol in your diet to less than _________ mg per day.  What are tips for following this plan?  Cooking  Cook foods using methods other than frying. Baking, boiling, grilling, and broiling are all good options. Other ways to reduce fat include:  · Removing the skin from poultry.  · Removing all visible fats from meats.  · Steaming vegetables in water or broth.  Meal planning    · At meals, imagine dividing your plate into fourths:  ? Fill one-half of your plate with vegetables and green salads.  ? Fill one-fourth of your plate with whole grains.  ? Fill one-fourth of your plate with lean protein foods.  · Eat 4-5 servings of vegetables per day. One serving equals 1 cup raw or cooked vegetable, or 2 cups raw leafy greens.  · Eat 4-5 servings of fruit per day. One serving equals 1 medium whole fruit, ¼ cup dried fruit, ½ cup fresh, frozen, or canned fruit, or ½ cup 100% fruit juice.  · Eat more foods that contain soluble fiber. Examples include apples, broccoli, carrots, beans, peas, and barley. Aim to get 25-30 g of fiber per day.  · Increase your consumption of legumes, nuts, and seeds to 4-5 servings per week. One serving of dried beans or legumes equals ½ cup cooked, 1 serving of nuts is ¼ cup, and 1 serving of seeds equals 1 tablespoon.  Fats  · Choose healthy fats more often. Choose monounsaturated and polyunsaturated fats, such as olive and  canola oils, flaxseeds, walnuts, almonds, and seeds.  · Eat more omega-3 fats. Choose salmon, mackerel, sardines, tuna, flaxseed oil, and ground flaxseeds. Aim to eat fish at least 2 times each week.  · Check food labels carefully to identify foods with trans fats or high amounts of saturated fat.  · Limit saturated fats. These are found in animal products, such as meats, butter, and cream. Plant sources of saturated fats include palm oil, palm kernel oil, and coconut oil.  · Avoid foods with partially hydrogenated oils in them. These contain trans fats. Examples are stick margarine, some tub margarines, cookies, crackers, and other baked goods.  · Avoid fried foods.  General information  · Eat more home-cooked food and less restaurant, buffet, and fast food.  · Limit or avoid alcohol.  · Limit foods that are high in starch and sugar.  · Lose weight if you are overweight. Losing just 5-10% of your body weight can help your overall health and prevent diseases such as diabetes and heart disease.  · Monitor your salt (sodium) intake, especially if you have high blood pressure. Talk with your health care provider about your sodium intake.  · Try to incorporate more vegetarian meals weekly.  What foods can I eat?  Fruits  All fresh, canned (in natural juice), or frozen fruits.  Vegetables  Fresh or frozen vegetables (raw, steamed, roasted, or grilled). Green salads.  Grains  Most grains. Choose whole wheat and whole grains most of the time. Rice and pasta, including brown rice and pastas made with whole wheat.  Meats and other proteins  Lean, well-trimmed beef, veal, pork, and lamb. Chicken and turkey without skin. All fish and shellfish. Wild duck, rabbit, pheasant, and venison. Egg whites or low-cholesterol egg substitutes. Dried beans, peas, lentils, and tofu. Seeds and most nuts.  Dairy  Low-fat or nonfat cheeses, including ricotta and mozzarella. Skim or 1% milk (liquid, powdered, or evaporated). Buttermilk made  with low-fat milk. Nonfat or low-fat yogurt.  Fats and oils  Non-hydrogenated (trans-free) margarines. Vegetable oils, including soybean, sesame, sunflower, olive, peanut, safflower, corn, canola, and cottonseed. Salad dressings or mayonnaise made with a vegetable oil.  Beverages  Water (mineral or sparkling). Coffee and tea. Diet carbonated beverages.  Sweets and desserts  Sherbet, gelatin, and fruit ice. Small amounts of dark chocolate.  Limit all sweets and desserts.  Seasonings and condiments  All seasonings and condiments.  The items listed above may not be a complete list of foods and beverages you can eat. Contact a dietitian for more options.  What foods are not recommended?  Fruits  Canned fruit in heavy syrup. Fruit in cream or butter sauce. Fried fruit. Limit coconut.  Vegetables  Vegetables cooked in cheese, cream, or butter sauce. Fried vegetables.  Grains  Breads made with saturated or trans fats, oils, or whole milk. Croissants. Sweet rolls. Donuts. High-fat crackers, such as cheese crackers.  Meats and other proteins  Fatty meats, such as hot dogs, ribs, sausage, kwong, rib-eye roast or steak. High-fat deli meats, such as salami and bologna. Caviar. Domestic duck and goose. Organ meats, such as liver.  Dairy  Cream, sour cream, cream cheese, and creamed cottage cheese. Whole milk cheeses. Whole or 2% milk (liquid, evaporated, or condensed). Whole buttermilk. Cream sauce or high-fat cheese sauce. Whole-milk yogurt.  Fats and oils  Meat fat, or shortening. Cocoa butter, hydrogenated oils, palm oil, coconut oil, palm kernel oil. Solid fats and shortenings, including kwong fat, salt pork, lard, and butter. Nondairy cream substitutes. Salad dressings with cheese or sour cream.  Beverages  Regular sodas and any drinks with added sugar.  Sweets and desserts  Frosting. Pudding. Cookies. Cakes. Pies. Milk chocolate or white chocolate. Buttered syrups. Full-fat ice cream or ice cream drinks.  The items listed  above may not be a complete list of foods and beverages to avoid. Contact a dietitian for more information.  Summary  · Heart-healthy meal planning includes limiting unhealthy fats, increasing healthy fats, and making other diet and lifestyle changes.  · Lose weight if you are overweight. Losing just 5-10% of your body weight can help your overall health and prevent diseases such as diabetes and heart disease.  · Focus on eating a balance of foods, including fruits and vegetables, low-fat or nonfat dairy, lean protein, nuts and legumes, whole grains, and heart-healthy oils and fats.  This information is not intended to replace advice given to you by your health care provider. Make sure you discuss any questions you have with your health care provider.  Document Released: 09/26/2009 Document Revised: 01/25/2019 Document Reviewed: 01/25/2019  Elsevier Patient Education © 2020 [employer]

## 2020-08-24 ENCOUNTER — TRANSCRIBE ORDERS (OUTPATIENT)
Dept: ADMINISTRATIVE | Facility: HOSPITAL | Age: 65
End: 2020-08-24

## 2020-08-24 DIAGNOSIS — Z01.818 PREOP TESTING: Primary | ICD-10-CM

## 2020-08-28 ENCOUNTER — TRANSCRIBE ORDERS (OUTPATIENT)
Dept: ADMINISTRATIVE | Facility: HOSPITAL | Age: 65
End: 2020-08-28

## 2020-08-28 DIAGNOSIS — Z01.818 PRE-OP TESTING: Primary | ICD-10-CM

## 2020-09-03 RX ORDER — CARVEDILOL 12.5 MG/1
TABLET ORAL
Qty: 180 TABLET | Refills: 0 | Status: ON HOLD | OUTPATIENT
Start: 2020-09-03 | End: 2021-04-12 | Stop reason: HOSPADM

## 2020-09-08 ENCOUNTER — LAB (OUTPATIENT)
Dept: LAB | Facility: HOSPITAL | Age: 65
End: 2020-09-08

## 2020-09-08 DIAGNOSIS — Z01.818 PRE-OP TESTING: ICD-10-CM

## 2020-09-08 PROCEDURE — C9803 HOPD COVID-19 SPEC COLLECT: HCPCS

## 2020-09-08 PROCEDURE — U0003 INFECTIOUS AGENT DETECTION BY NUCLEIC ACID (DNA OR RNA); SEVERE ACUTE RESPIRATORY SYNDROME CORONAVIRUS 2 (SARS-COV-2) (CORONAVIRUS DISEASE [COVID-19]), AMPLIFIED PROBE TECHNIQUE, MAKING USE OF HIGH THROUGHPUT TECHNOLOGIES AS DESCRIBED BY CMS-2020-01-R: HCPCS

## 2020-09-09 DIAGNOSIS — M05.79 RHEUMATOID ARTHRITIS INVOLVING MULTIPLE SITES WITH POSITIVE RHEUMATOID FACTOR (HCC): Primary | ICD-10-CM

## 2020-09-09 DIAGNOSIS — M54.40 LUMBAGO OF MULTIPLE SITES IN SPINE WITH SCIATICA: ICD-10-CM

## 2020-09-09 LAB
COVID LABCORP PRIORITY: NORMAL
SARS-COV-2 RNA RESP QL NAA+PROBE: NOT DETECTED

## 2020-09-10 ENCOUNTER — OFFICE VISIT (OUTPATIENT)
Dept: OTOLARYNGOLOGY | Facility: CLINIC | Age: 65
End: 2020-09-10

## 2020-09-10 VITALS
DIASTOLIC BLOOD PRESSURE: 68 MMHG | SYSTOLIC BLOOD PRESSURE: 121 MMHG | WEIGHT: 207 LBS | TEMPERATURE: 97.2 F | HEART RATE: 87 BPM | BODY MASS INDEX: 33.27 KG/M2 | HEIGHT: 66 IN

## 2020-09-10 DIAGNOSIS — R68.2 DRY MOUTH: ICD-10-CM

## 2020-09-10 DIAGNOSIS — H72.93: ICD-10-CM

## 2020-09-10 DIAGNOSIS — J32.4 CHRONIC PANSINUSITIS: ICD-10-CM

## 2020-09-10 DIAGNOSIS — K21.9 GERD WITHOUT ESOPHAGITIS: ICD-10-CM

## 2020-09-10 DIAGNOSIS — H90.3 SENSORINEURAL HEARING LOSS (SNHL) OF BOTH EARS: ICD-10-CM

## 2020-09-10 DIAGNOSIS — R49.0 DYSPHONIA: Primary | ICD-10-CM

## 2020-09-10 DIAGNOSIS — K21.9 LARYNGOPHARYNGEAL REFLUX (LPR): ICD-10-CM

## 2020-09-10 DIAGNOSIS — J34.2 ACQUIRED DEVIATED NASAL SEPTUM: ICD-10-CM

## 2020-09-10 DIAGNOSIS — H69.81 DYSFUNCTION OF RIGHT EUSTACHIAN TUBE: ICD-10-CM

## 2020-09-10 PROCEDURE — 31231 NASAL ENDOSCOPY DX: CPT | Performed by: OTOLARYNGOLOGY

## 2020-09-10 PROCEDURE — 99213 OFFICE O/P EST LOW 20 MIN: CPT | Performed by: OTOLARYNGOLOGY

## 2020-09-10 RX ORDER — CHOLECALCIFEROL (VITAMIN D3) 1250 MCG
CAPSULE ORAL
COMMUNITY
Start: 2020-09-02 | End: 2020-09-10 | Stop reason: SDUPTHER

## 2020-09-10 RX ORDER — LEVOTHYROXINE SODIUM 0.07 MG/1
75 TABLET ORAL DAILY
COMMUNITY
Start: 2020-09-03 | End: 2021-03-26 | Stop reason: HOSPADM

## 2020-09-10 RX ORDER — EZETIMIBE 10 MG/1
10 TABLET ORAL DAILY
COMMUNITY
Start: 2020-08-23 | End: 2021-02-12 | Stop reason: HOSPADM

## 2020-09-10 NOTE — PATIENT INSTRUCTIONS
Prednisone 10 mg every other day    NASAL SALINE:  Use 2 puffs each nostril 4-6 times daily and more frequently if possible.  You can buy saline spray or you can make your own and use an old spray bottle to administer  Use a humidifier at bedside  Recipe for saline:  Water                                 1 quart  Salt (table)                        1 tablespoon  Gylcerin (or Bruna Syrup)    1 teaspoon  Sodium bicarbonate           1 teaspoon  Sprays or Alison pots are recommended    Nasal steroid use:  Using nasal steroids:  You will be prescribed one of the following nasal steroids: Flonase, Nasacort, Nasonex, Rhinocort, Qnasl, Zetonna  2 puffs each nostril 2 times daily  Start as soon as possible  If you are using Afrin for 3 days with the nasal steroid,  Use Afrin first and wait 10 minutes to allow the nose to open. Then administer nasal steroids.    Water pik to clean nose use salin recipe     [email or web address]

## 2020-09-10 NOTE — PROGRESS NOTES
ENT PROCEDURE NOTE:  Anesthesia: topical 4% tetracaine and oxymetazoline mix    Endoscopy Type:  Nasal Endoscopic Examination    Indications:   1. Dysphonia    2. Monomeric tympanic membrane of both ears    3. GERD without esophagitis    4. Sensorineural hearing loss (SNHL) of both ears    5. Laryngopharyngeal reflux (LPR)    6. Dry mouth    7. Dysfunction of right eustachian tube    8. Chronic pansinusitis         Procedure Details:    The patient was placed in the sitting position. After topical anesthesia and decongestion,  a rigid nasal endoscope  0 degree was passed along the nasal floor to the nasopharynx.  The scope was then passed to the middle and superior aspects of the nasal cavity. Systematic examination of the nasal cavity, nasopharynx and structures was performed.     Findings:  NASAL ENDOSCOPIC FINDINGS:    Nasal endoscopy   NASALMUCOSA:    abnormal:        Bilateral- very thick, red and draining    NASAL PASSAGES:     abnormal   Left- very narrowed, Right- partially obstructed by septum   NASAL VALVE:     intact, good support and no nasal obstruction   SEPTUM:     mucosa abnormal   Bilateral- red, thick, wet     deviation present:      deviated Right mid mod to severe    touching turbinate   Right- IT   INFERIOR TURBINATES:     abnormal  Bilateral- red, wet, enlarged    MIDDLE TURBINATES:     abnormal:        BILATERAL: red, thick, enlarged   MIDDLE MEATUS:       findings       BILATERAL: mucosa very red and thick   SPHENO-ETHMOID RECESS:    secretions blocking   SECRETIONS:     abnormal Bilateral- present in th entire nasal cavity     Amount:  Bilateral- copious     Consistency:  Bilateral- very thick     Color:  Bilateral- white     Condition:  Stable.  Patient tolerated procedure well.    Complications:  None    Post-procedure instructions reviewed with Patient  Instructions documented in AVS for patient to review   detailed exam

## 2020-09-10 NOTE — PROGRESS NOTES
Elie Harvey Jr, MD     ENT FOLLOW UP NOTE     Chief Complaint   Patient presents with   • Dysphonia     1 month followup        HISTORY OF PRESENT ILLNESS:  Accompanied by:  No one  Tawny Shin is a  65 y.o. female who is here for follow up. She was feeling good until last Fri when GD gave her cold. She is stopped up.  Voice- better.  Medications- Pred daily  Flonase BID.    Review of Systems   Constitutional: Negative for appetite change, fatigue and fever.   HENT:        See HPI   Respiratory: Negative for cough, choking and shortness of breath.    Gastrointestinal: Negative for diarrhea, nausea and vomiting.   Skin: Negative for rash.   Neurological: Negative for dizziness, light-headedness and headaches.   Psychiatric/Behavioral: Negative for sleep disturbance.       Past History:  Past medical and surgical history, family history and social history reviewed and updated when appropriate.  Current medications and allergies reviewed and updated when appropriate.  Allergies:  Atorvastatin; Clindamycin/lincomycin; Escitalopram; Amoxicillin; Nabumetone; Niacin er; Penicillins; and Simvastatin        Vital Signs:   Temp:  [97.2 °F (36.2 °C)] 97.2 °F (36.2 °C)  Heart Rate:  [87] 87  BP: (121)/(68) 121/68  EXAMINATION:  CONSTITUTIONAL:    well nourished, well-developed, alert, oriented, in no acute distress      BODY HABITUS:    Normal body habitus     COMMUNICATION:    able to communicate normally, normal voice quality     HEAD:     Normocephalic, without obvious abnormality, atraumatic     FACE:    structure normal, no tenderness present, no lesions/masses, no evidence of trauma     SALIVARY GLANDS:    parotid glands with no tenderness, no swelling, no masses, submandibular glands with normal size, nontender      EYE:    ocular motility normal, eyelids normal, orbits normal, no proptosis, conjunctiva clear, sclera non-icteric, pupils equal, round, reactive to light and accomodation  Color:   blue     HEARING:       response to conversational voice normal with hearing aids     EARS:    Otoscopic exam    EXTERNAL AUDITORY CANALS:     normal ear canals without stenosis or significant cerumen, skin intact and normal shape   TYMPANIC MEMBRANES:     Abnormal tympanic membrane:        RIGHT: monomer posterior inferior, intact TM  LEFT: monomer poster inferior, TM intact   MIDDLE EAR:     Middle clear, no fluid, Ossicular chain intact   MASTOID:     Non-surgical, Non-tender    PINNA:     normal, non-tender, skin intact without lesions      NOSE EXTERNAL:    APPEARANCE: normal, straight, with good projection, no tenderness, no lesions, no tenderness, good nasal support, patent nares     NOSE INTERNAL:    See procedure note     ORAL CAVITY:    Normal lips with no lesions, dentition normal for age, FOM intact without lesions and normal salivary flow, Mucosa intact without lesions, Hard and soft palate normal without lesions     OROPHARYNX:    Direct examination  oropharyngeal mucosa normal, tonsil(s) with normal appearance       NECK:    normal appearance, no masses, no lesions, larynx normal mobility, trachea midline   Neck position: lordotic     LYMPH NODES :    no adenopathy     THYROID:    no overt thyromegaly, no tenderness, nodules or mass present on palpation, position midline     CHEST/RESPIRATORY:    respiratory effort normal, no rales, rubs or wheezing, no stridor, normal appearance to chest     CARDIOVASCULAR:    regular rate and rhythm, no murmurs, gallups, no peripheral edema     NEURO/PSYCHIATRIC :    oriented appropriately for age, mood normal, affect appropriate, cranial nerves intact grossly (unless specifically described), gait normal for age    RESULTS REVIEW:    I have reviewed the patients old records in the chart.           ASSESSMENT:      Diagnosis Plan   1. Dysphonia      improved   2. Monomeric tympanic membrane of both ears     3. GERD without esophagitis     4. Sensorineural hearing loss (SNHL) of both  ears     5. Laryngopharyngeal reflux (LPR)     6. Dry mouth     7. Dysfunction of right eustachian tube     8. Chronic pansinusitis      Partially controlled with steroids   9. Acquired deviated nasal septum      L to R mid mod to severe           PLAN:      Continue current management plan.  Patient has had recent URI symptoms. This may account for current nasal findings. I will begin slow steroid wean.  I will follow closely and if she worsens, I will discuss surgical options.   Pred QOD 10 mg  Flonase Bid  Nasal saline  Calcium Supplements  Water pik to nose  MY CHART:  Patient is Patient is using My Chart          Patient understand(s) and agree(s) with the treatment plan as described.    Return in about 1 month (around 10/10/2020) for Recheck nose, Pred dosing.     Elie Harvey Jr, MD  09/10/20  15:50

## 2020-10-05 ENCOUNTER — TRANSCRIBE ORDERS (OUTPATIENT)
Dept: ADMINISTRATIVE | Facility: HOSPITAL | Age: 65
End: 2020-10-05

## 2020-10-05 ENCOUNTER — HOSPITAL ENCOUNTER (OUTPATIENT)
Dept: GENERAL RADIOLOGY | Facility: HOSPITAL | Age: 65
Discharge: HOME | End: 2020-10-05
Admitting: INTERNAL MEDICINE

## 2020-10-05 DIAGNOSIS — M53.3 SACROCOCCYGEAL DISORDERS, NOT ELSEWHERE CLASSIFIED: Primary | ICD-10-CM

## 2020-10-05 PROCEDURE — 73502 X-RAY EXAM HIP UNI 2-3 VIEWS: CPT

## 2020-10-05 PROCEDURE — 72100 X-RAY EXAM L-S SPINE 2/3 VWS: CPT

## 2020-10-12 ENCOUNTER — OFFICE VISIT (OUTPATIENT)
Dept: OTOLARYNGOLOGY | Facility: CLINIC | Age: 65
End: 2020-10-12

## 2020-10-12 VITALS
DIASTOLIC BLOOD PRESSURE: 78 MMHG | BODY MASS INDEX: 33.37 KG/M2 | WEIGHT: 207.6 LBS | HEIGHT: 66 IN | SYSTOLIC BLOOD PRESSURE: 149 MMHG | TEMPERATURE: 97.7 F | HEART RATE: 90 BPM

## 2020-10-12 DIAGNOSIS — J34.3 NASAL TURBINATE HYPERTROPHY: ICD-10-CM

## 2020-10-12 DIAGNOSIS — J32.4 CHRONIC PANSINUSITIS: ICD-10-CM

## 2020-10-12 DIAGNOSIS — J34.2 ACQUIRED DEVIATED NASAL SEPTUM: ICD-10-CM

## 2020-10-12 DIAGNOSIS — H72.93: ICD-10-CM

## 2020-10-12 DIAGNOSIS — R68.2 DRY MOUTH: ICD-10-CM

## 2020-10-12 DIAGNOSIS — K21.9 GERD WITHOUT ESOPHAGITIS: ICD-10-CM

## 2020-10-12 DIAGNOSIS — Z97.4 PRESENCE OF EXTERNAL HEARING AID: ICD-10-CM

## 2020-10-12 DIAGNOSIS — H90.3 SENSORINEURAL HEARING LOSS (SNHL) OF BOTH EARS: ICD-10-CM

## 2020-10-12 DIAGNOSIS — K21.9 LARYNGOPHARYNGEAL REFLUX (LPR): ICD-10-CM

## 2020-10-12 DIAGNOSIS — H69.81 DYSFUNCTION OF RIGHT EUSTACHIAN TUBE: ICD-10-CM

## 2020-10-12 DIAGNOSIS — R49.0 DYSPHONIA: Primary | ICD-10-CM

## 2020-10-12 PROCEDURE — 99213 OFFICE O/P EST LOW 20 MIN: CPT | Performed by: OTOLARYNGOLOGY

## 2020-10-12 NOTE — PATIENT INSTRUCTIONS
NASAL SALINE:  Use 2 puffs each nostril 4-6 times daily and more frequently if possible.  You can buy saline spray or you can make your own and use an old spray bottle to administer  Use a humidifier at bedside  Recipe for saline:  Water                                 1 quart  Salt (table)                        1 tablespoon  Gylcerin (or Bruna Syrup)    1 teaspoon  Sodium bicarbonate           1 teaspoon  Sprays or Alison pots are recommended    NASAL SALINE:  Use 2 puffs each nostril 4-6 times daily and more frequently if possible.  You can buy saline spray or you can make your own and use an old spray bottle to administer  Use a humidifier at bedside  Recipe for saline:  Water                                 1 quart  Salt (table)                        1 tablespoon  Gylcerin (or Bruna Syrup)    1 teaspoon  Sodium bicarbonate           1 teaspoon  Sprays or [place] pots are recommended    Diet  Exercise    VOICE HYGIENE:    Many factors go into manufacturing what we call the voice. More goes on than just breathing out and using the voice box and mouth. Creating the voice requires good lung function, a healthy voice box, and complex muscle coordination. Any malfunction in any of the systems and voice problems can occur.    The most common problem seen in the office is hoarseness. Other types of voice problems can include breathiness, double voice, raspy voice, or simply a change in the pitch. Many things can cause a hoarse or weak voice, including laryngitis, vocal abuse (screaming, cheering, singing too loud for too long), smoking, etc. In order to treat the problem, your physician first must find the cause, then try to correct it to restore your normal voice.    The first part of a voice analysis is a detailed history of the problem, including any habits such as smoking, to delineate possible factors. The second step is a careful examination of the head and neck, including the voice box. The examination is  essential, as this eliminates any anatomic problems with the vocal cords and the surrounding structures. More detailed analysis may be used in the form of videotaping the vocal cords with a stroboscopic light, thus providing additional information.     Suggestions for Voice Use and Conservation    Treatment of voice problems depends on the cause of the problems. Fortunately, most problems are easily correctable. The following are a few suggestions you will be asked to carry out, depending on the results of your examination.    >Avoid stress  >Avoid habitually clearing throat as this strains and fatigues the vocal cords  >Avoid yelling, especially for extended periods of time  >Avoid falsetto singing  >Avoid talking when it hurts or if your neck is excessively tired.  >Do exercise regularly to maintain fitness and breathe control. Do eat correctly, to avoid acid reflux  >Drink 6-8 glasses of water daily to keep the vocal cords flexible, making it easier to speak  >Do take sips of water while speaking as swallowing helps relax the muscles of the throat and neck  >Do sip water, sniff or say the letter “H” forcefully rather than clearing throat  >Do listen to your voice as you speak to avoid trailing off or “tightness” at the end of sentences  >Do use plenty of breath while speaking. If you feel you are running out of breath, stop, breathe then continue.  >Do speak easily rather than pushing, forcing or straining  >Do stop talking and take a voice break, especially if you are fading or fatiguing  >Do increase your awareness of your voice. When does it sound better, worse? >Recording your voice or watching yourself speak in a mirror to watch for movement in the neck and shoulders can be beneficial.    Whispering is not a protective mechanism for the voice. In fact, whispering can make your condition worse. In order to limit further damage to your voice, maintain a normal volume and tone. Only in certain circumstances  will this recommendation require modification. Dr. Harvey will tell you if that is the case.    Complete voice rest is used occasionally to treat voice and vocal cord problems. However, this is a rare situation. Dr. Harvey will tell you if this is indicated.    A great deal of voice hygiene is based on common sense. If it hurts to talk, then a problem exists.    Vocal Strengthening Exercises    >Sit in a comfortable and upright position. The chair should have a hard back and hard arm rests.  >Rest your hands on the arms of the chair. Push down with both of your hands at the same time. Do not use too much shoulder movement, simply push with your hands. You should feel a tightening of your abdominal muscles, not your neck muscles.  >After you become comfortable with the pushing motion, begin to make a short sound (such as at/ ah) each time you push down. You can substitute other short sounds such as a vowel sound or counting.  >Be sure to take a deep breath before each push down and vocalization of the sound.  Summary of exercise sequence:   Take a deep breath in  Push down while saying ah  Do NOT hold your breath  Relax and breathe out  Repeat above sequence 15-20 times every hour or several times each day.    Speech Pathology    Frequently, physicians and speech pathologists treat voice disorders together. These are highly trained individuals who specialize in therapy for the voice box and throat. You may recognize the better as speech, swallowing or voice therapists. Dr. Harvey may elect to refer you for evaluation and treatment, depending on your diagnosis and condition.    Special Testing    You may be referred certain tests to fully evaluate your throat, swallowing or voice. These may include CAT scans, swallowing tests, MRIs, or videostroboscopy. You may not be familiar these tests but the results will discussed them with you at the time of your office visit and answer your questions about them.    Thank  you for enrolling in [facility]. Please follow the instructions below to securely access your online medical record. [facility] allows you to send messages to your doctor, view your test results, renew your prescriptions, schedule appointments, and more.    How Do I Sign Up?  1. In your Internet browser, go to [email or web address]  2. Click on the Sign Up Now link in the New User? box.   3. Enter your [employer] Activation Code exactly as it appears below. You will not need to use this code after you have completed the sign-up process. If you do not sign up before the expiration date, you must request a new code.  [facility] Activation Code: Activation code not generated  Current [facility] Status: Active    4. Enter the last four digits of your Social Security Number and your Date of Birth as indicated and click Next. You will be taken to the next sign-up page.  5. Create a [facility] username. Think of one that is secure and easy to remember.  6. Create a [facility] password. You can change your password at any time.  7. Choose a security question, enter your answer, and click Next. This can be used to access [facility] if you forget your password.   8. Select your communication preference. Enter a valid e-mail address if you would like to receive e-mail notifications when new information is available in [facility].  9. Click Sign In. You can now view your medical record.     Additional Information  If you have questions, you can e-mail [email or web address], or call [phone] to talk to our [facility] staff. Remember, [facility] is NOT to be used for urgent needs. For medical emergencies, dial 911.

## 2020-10-12 NOTE — PROGRESS NOTES
Elie Harvey Jr, MD     ENT FOLLOW UP NOTE     Chief Complaint   Patient presents with   • Follow-up        HISTORY OF PRESENT ILLNESS:  Accompanied by:  No one  Tawny Shin is a  65 y.o. female who is here for follow up. Voice is better since she overcame cold.  She wishes to decrease steroids. She has steroids isde effects. She is gaining weight.    Review of Systems  Patient Intake Note    Review of Systems  Review of Systems   Constitutional: Negative for chills, fatigue and fever.   HENT: Positive for postnasal drip and sore throat. Negative for congestion, ear discharge, ear pain, sinus pressure, sinus pain, tinnitus, trouble swallowing and voice change.    Respiratory: Negative for cough and shortness of breath.    Gastrointestinal: Negative for diarrhea, nausea and vomiting.   Musculoskeletal: Negative for neck pain and neck stiffness.   Skin: Negative for rash and wound.   Neurological: Negative for dizziness, seizures and headaches.   Hematological: Bruises/bleeds easily.   Psychiatric/Behavioral: Negative for sleep disturbance.       Tobacco Use: Screening and Cessation Intervention  Social History    Tobacco Use      Smoking status: Never Smoker      Smokeless tobacco: Never Used        Reviewed per patient intake note and confirmed by me    Past History:  Past medical and surgical history, family history and social history reviewed and updated when appropriate.  Current medications and allergies reviewed and updated when appropriate.  Allergies:  Atorvastatin, Clindamycin/lincomycin, Amoxicillin, Escitalopram, Nabumetone, Niacin er, Penicillins, and Simvastatin        Vital Signs:   Temp:  [97.7 °F (36.5 °C)] 97.7 °F (36.5 °C)  Heart Rate:  [90] 90  BP: (149)/(78) 149/78  EXAMINATION:  CONSTITUTIONAL:    well nourished, well-developed, alert, oriented, in no acute distress      BODY HABITUS:    Normal body habitus     COMMUNICATION:    able to communicate normally, normal voice quality     HEAD:      Normocephalic, without obvious abnormality, atraumatic     FACE:    structure normal, no tenderness present, no lesions/masses, no evidence of trauma     SALIVARY GLANDS:    parotid glands with no tenderness, no swelling, no masses, submandibular glands with normal size, nontender      EYE:    ocular motility normal, eyelids normal, orbits normal, no proptosis, conjunctiva clear, sclera non-icteric, pupils equal, round, reactive to light and accomodation  Color:   blue     HEARING:      response to conversational voice normal with hearing aids     EARS:    Otoscopic exam    EXTERNAL AUDITORY CANALS:     normal ear canals without stenosis or significant cerumen, skin intact and normal shape   TYMPANIC MEMBRANES:     Abnormal tympanic membrane:        RIGHT: monomer posterior inferior, intact TM  LEFT: monomer poster inferior, TM intact   MIDDLE EAR:     Middle clear, no fluid, Ossicular chain intact   MASTOID:     Non-surgical, Non-tender    PINNA:     normal, non-tender, skin intact without lesions   AS:     AD:    NOSE EXTERNAL:    APPEARANCE: normal, straight, with good projection, no tenderness, no lesions, no tenderness, good nasal support, patent nares     NOSE INTERNAL:    See procedure note     ORAL CAVITY:    Normal lips with no lesions, dentition normal for age, FOM intact without lesions and normal salivary flow, Mucosa intact without lesions, Hard and soft palate normal without lesions     OROPHARYNX:    Direct examination  oropharyngeal mucosa normal, tonsil(s) with normal appearance       NECK:    normal appearance, no masses, no lesions, larynx normal mobility, trachea midline   Neck position: lordotic     LYMPH NODES :    no adenopathy     THYROID:    no overt thyromegaly, no tenderness, nodules or mass present on palpation, position midline     CHEST/RESPIRATORY:    respiratory effort normal, no rales, rubs or wheezing, no stridor, normal appearance to chest     CARDIOVASCULAR:    regular rate  and rhythm, no murmurs, gallups, no peripheral edema     NEURO/PSYCHIATRIC :    oriented appropriately for age, mood normal, affect appropriate, cranial nerves intact grossly (unless specifically described), gait normal for age    RESULTS REVIEW:    I have reviewed the patients old records in the chart.           ASSESSMENT:      Diagnosis Plan   1. Dysphonia      Improved moderately   2. Monomeric tympanic membrane of both ears      No change  No therapy required   3. GERD without esophagitis     4. Sensorineural hearing loss (SNHL) of both ears     5. Laryngopharyngeal reflux (LPR)      mild   6. Dry mouth     7. Dysfunction of right eustachian tube     8. Chronic pansinusitis     9. Acquired deviated nasal septum     10. Nasal turbinate hypertrophy     11. Presence of external hearing aid             PLAN:      Continue current management plan.  Patient ha sstable voice. Ears are stable nd without disease.  She will continue nasal steroids, saline and voice hygiene. She says she is singing better.  Flonase BID  Wear hearing aids  Nasal saline  Voice hygiene  MY CHART:  Patient is Patient is using My Chart          Patient understand(s) and agree(s) with the treatment plan as described.    Return if symptoms worsen or fail to improve, for Recheck voice.     Elie Harvey Jr, MD  10/12/20  11:10 CDT

## 2020-10-19 ENCOUNTER — TELEPHONE (OUTPATIENT)
Dept: CARDIOLOGY | Facility: CLINIC | Age: 65
End: 2020-10-19

## 2020-10-19 NOTE — TELEPHONE ENCOUNTER
Patient has left a message today stating she takes the Repatha shot every two weeks, she is a former Dr. Valadez patient and they had her enrolled in [employer]. Patient states she has one more shot and is needing to be re-enrolled in program.

## 2020-10-20 NOTE — TELEPHONE ENCOUNTER
Notified patient that I will be mailing her patient assistance forms for re-enrollment. She voiced understanding. Beulah Robertson MA

## 2020-10-22 ENCOUNTER — HOSPITAL ENCOUNTER (OUTPATIENT)
Dept: GENERAL RADIOLOGY | Facility: HOSPITAL | Age: 65
Discharge: HOME | End: 2020-10-22
Admitting: PSYCHIATRY & NEUROLOGY

## 2020-10-22 DIAGNOSIS — M17.0 ARTHRITIS OF BOTH KNEES: ICD-10-CM

## 2020-10-22 DIAGNOSIS — M05.20 RHEUMATOID ARTERITIS (HCC): Primary | ICD-10-CM

## 2020-10-22 DIAGNOSIS — M05.20 RHEUMATOID ARTERITIS (HCC): ICD-10-CM

## 2020-10-22 PROCEDURE — 73562 X-RAY EXAM OF KNEE 3: CPT

## 2020-10-22 PROCEDURE — 73630 X-RAY EXAM OF FOOT: CPT

## 2020-10-22 PROCEDURE — 73502 X-RAY EXAM HIP UNI 2-3 VIEWS: CPT

## 2020-10-22 RX ORDER — CLONIDINE HYDROCHLORIDE 0.1 MG/1
TABLET ORAL
Qty: 105 TABLET | Refills: 3 | Status: ON HOLD | OUTPATIENT
Start: 2020-10-22 | End: 2021-02-11

## 2020-11-09 ENCOUNTER — TELEPHONE (OUTPATIENT)
Dept: INTERNAL MEDICINE | Facility: CLINIC | Age: 65
End: 2020-11-09

## 2020-11-09 DIAGNOSIS — Z20.822 EXPOSURE TO COVID-19 VIRUS: Primary | ICD-10-CM

## 2020-11-09 NOTE — TELEPHONE ENCOUNTER
Called pt and her grandson was positive and she was around him last Monday.  Called [employer] and placed order and they will contact her to tell her when to come for drive thru testing.  Pt voiced understanding.

## 2020-11-09 NOTE — TELEPHONE ENCOUNTER
PATIENT CALLED STATING SHE HAS BEEN EXPOSED TO COVID.  SHE WOULD LIKE A CALL BACK AS TO GETTING A COVID TEST.    PLEASE CALL AND ADVISE  [phone]

## 2020-11-10 ENCOUNTER — LAB (OUTPATIENT)
Dept: LAB | Facility: HOSPITAL | Age: 65
End: 2020-11-10

## 2020-11-10 DIAGNOSIS — Z20.822 EXPOSURE TO COVID-19 VIRUS: ICD-10-CM

## 2020-11-10 PROCEDURE — C9803 HOPD COVID-19 SPEC COLLECT: HCPCS

## 2020-11-10 PROCEDURE — U0003 INFECTIOUS AGENT DETECTION BY NUCLEIC ACID (DNA OR RNA); SEVERE ACUTE RESPIRATORY SYNDROME CORONAVIRUS 2 (SARS-COV-2) (CORONAVIRUS DISEASE [COVID-19]), AMPLIFIED PROBE TECHNIQUE, MAKING USE OF HIGH THROUGHPUT TECHNOLOGIES AS DESCRIBED BY CMS-2020-01-R: HCPCS

## 2020-11-11 ENCOUNTER — TELEPHONE (OUTPATIENT)
Dept: CARDIOLOGY | Facility: CLINIC | Age: 65
End: 2020-11-11

## 2020-11-11 LAB — SARS-COV-2 RNA RESP QL NAA+PROBE: NOT DETECTED

## 2020-11-11 NOTE — TELEPHONE ENCOUNTER
Patient called the office to let us know she has mailed us a form from [employer] so that she can potentially get her Repatha for free.  She can be reached at [phone].  Thank you!

## 2020-11-17 RX ORDER — SALSALATE 500 MG
TABLET ORAL
Qty: 416 TABLET | Refills: 2 | Status: SHIPPED | OUTPATIENT
Start: 2020-11-17 | End: 2021-03-06 | Stop reason: HOSPADM

## 2020-12-03 ENCOUNTER — TRANSCRIBE ORDERS (OUTPATIENT)
Dept: ADMINISTRATIVE | Facility: HOSPITAL | Age: 65
End: 2020-12-03

## 2020-12-03 DIAGNOSIS — M70.61 TROCHANTERIC BURSITIS OF RIGHT HIP: Primary | ICD-10-CM

## 2020-12-04 ENCOUNTER — TELEPHONE (OUTPATIENT)
Dept: CARDIOLOGY | Facility: CLINIC | Age: 65
End: 2020-12-04

## 2020-12-04 NOTE — TELEPHONE ENCOUNTER
Patient is in need of MRI at [facility].  Cardiology Order Form completed.  Form signed by DANIELA Monk, as Dr. Hernandez and his APRN are out of the office.  Form faxed to radiology.  Scheduling is aware.

## 2020-12-04 NOTE — TELEPHONE ENCOUNTER
Patient called to check on the status of her assistance application for Repatha. I apologized to the patient that I never received the forms in the mail. She thinks she has another set of forms at home, so she will complete them and mail in again. Beulah Robertson MA

## 2020-12-09 RX ORDER — CARVEDILOL 12.5 MG/1
TABLET ORAL
Qty: 180 TABLET | Refills: 3 | OUTPATIENT
Start: 2020-12-09

## 2020-12-10 ENCOUNTER — APPOINTMENT (OUTPATIENT)
Dept: MRI IMAGING | Facility: HOSPITAL | Age: 65
End: 2020-12-10

## 2020-12-11 ENCOUNTER — HOSPITAL ENCOUNTER (OUTPATIENT)
Dept: MRI IMAGING | Facility: HOSPITAL | Age: 65
Discharge: HOME | End: 2020-12-11
Admitting: INTERNAL MEDICINE

## 2020-12-11 DIAGNOSIS — M70.61 TROCHANTERIC BURSITIS OF RIGHT HIP: ICD-10-CM

## 2020-12-11 PROCEDURE — 73721 MRI JNT OF LWR EXTRE W/O DYE: CPT

## 2020-12-14 RX ORDER — ERGOCALCIFEROL 1.25 MG/1
50000 CAPSULE ORAL
Qty: 12 CAPSULE | Refills: 3 | Status: ON HOLD | OUTPATIENT
Start: 2020-12-14 | End: 2021-03-23

## 2020-12-17 DIAGNOSIS — M81.8 OTHER OSTEOPOROSIS WITHOUT CURRENT PATHOLOGICAL FRACTURE: ICD-10-CM

## 2020-12-17 DIAGNOSIS — M17.0 ARTHRITIS OF BOTH KNEES: Primary | ICD-10-CM

## 2020-12-18 ENCOUNTER — HOSPITAL ENCOUNTER (OUTPATIENT)
Dept: GENERAL RADIOLOGY | Facility: HOSPITAL | Age: 65
Discharge: HOME | End: 2020-12-18
Admitting: PHYSICIAN ASSISTANT

## 2020-12-18 DIAGNOSIS — M81.8 OTHER OSTEOPOROSIS WITHOUT CURRENT PATHOLOGICAL FRACTURE: ICD-10-CM

## 2020-12-18 DIAGNOSIS — M17.0 ARTHRITIS OF BOTH KNEES: ICD-10-CM

## 2020-12-18 PROCEDURE — 73562 X-RAY EXAM OF KNEE 3: CPT

## 2020-12-23 ENCOUNTER — OFFICE VISIT (OUTPATIENT)
Dept: INTERNAL MEDICINE | Facility: CLINIC | Age: 65
End: 2020-12-23

## 2020-12-23 VITALS
SYSTOLIC BLOOD PRESSURE: 136 MMHG | WEIGHT: 209.6 LBS | HEIGHT: 66 IN | RESPIRATION RATE: 16 BRPM | DIASTOLIC BLOOD PRESSURE: 80 MMHG | BODY MASS INDEX: 33.68 KG/M2 | HEART RATE: 93 BPM | TEMPERATURE: 97.1 F | OXYGEN SATURATION: 97 %

## 2020-12-23 DIAGNOSIS — Z78.0 POSTMENOPAUSAL: ICD-10-CM

## 2020-12-23 DIAGNOSIS — M05.79 SEROPOSITIVE RHEUMATOID ARTHRITIS OF MULTIPLE SITES (HCC): ICD-10-CM

## 2020-12-23 DIAGNOSIS — M05.79 RHEUMATOID ARTHRITIS INVOLVING MULTIPLE SITES WITH POSITIVE RHEUMATOID FACTOR (HCC): ICD-10-CM

## 2020-12-23 DIAGNOSIS — I25.10 CORONARY ARTERY DISEASE INVOLVING NATIVE CORONARY ARTERY OF NATIVE HEART WITHOUT ANGINA PECTORIS: ICD-10-CM

## 2020-12-23 DIAGNOSIS — E11.9 ENCOUNTER FOR DIABETIC FOOT EXAM (HCC): Primary | ICD-10-CM

## 2020-12-23 DIAGNOSIS — I10 ESSENTIAL HYPERTENSION: ICD-10-CM

## 2020-12-23 PROBLEM — D70.9 NEUTROPENIA: Status: ACTIVE | Noted: 2020-12-23

## 2020-12-23 PROBLEM — M14.672 CHARCOT'S JOINT OF FOOT, LEFT: Status: ACTIVE | Noted: 2020-12-23

## 2020-12-23 PROBLEM — H65.06 RECURRENT ACUTE SEROUS OTITIS MEDIA OF BOTH EARS: Status: ACTIVE | Noted: 2020-12-23

## 2020-12-23 PROBLEM — I97.190 CARDIAC PACEMAKER SYNDROME: Status: ACTIVE | Noted: 2020-12-23

## 2020-12-23 PROBLEM — M43.16 SPONDYLOLISTHESIS OF LUMBAR REGION: Status: ACTIVE | Noted: 2018-01-23

## 2020-12-23 PROBLEM — I51.9 MYXEDEMA HEART DISEASE: Status: ACTIVE | Noted: 2020-12-23

## 2020-12-23 PROBLEM — Z98.890 S/P LUMBAR LAMINECTOMY: Status: ACTIVE | Noted: 2020-12-23

## 2020-12-23 PROBLEM — E03.9 MYXEDEMA HEART DISEASE: Status: ACTIVE | Noted: 2020-12-23

## 2020-12-23 PROBLEM — B00.9: Status: ACTIVE | Noted: 2020-12-23

## 2020-12-23 PROBLEM — M17.12 PRIMARY OSTEOARTHRITIS OF LEFT KNEE: Status: ACTIVE | Noted: 2020-12-23

## 2020-12-23 PROBLEM — L40.0 PSORIASIS VULGARIS: Status: ACTIVE | Noted: 2020-12-23

## 2020-12-23 PROBLEM — M21.611 BILATERAL BUNIONS: Status: ACTIVE | Noted: 2020-12-23

## 2020-12-23 PROBLEM — E03.9 HYPOTHYROIDISM: Status: ACTIVE | Noted: 2020-12-23

## 2020-12-23 PROBLEM — E66.9 OBESITY: Status: ACTIVE | Noted: 2020-12-23

## 2020-12-23 PROBLEM — K59.00 CONSTIPATION: Status: ACTIVE | Noted: 2018-11-06

## 2020-12-23 PROBLEM — E55.9 VITAMIN D DEFICIENCY: Status: ACTIVE | Noted: 2020-12-23

## 2020-12-23 PROBLEM — M21.612 BILATERAL BUNIONS: Status: ACTIVE | Noted: 2020-12-23

## 2020-12-23 PROBLEM — J40 BRONCHITIS: Status: ACTIVE | Noted: 2020-12-23

## 2020-12-23 PROBLEM — J45.909 INTRINSIC ASTHMA: Status: ACTIVE | Noted: 2020-12-23

## 2020-12-23 PROBLEM — D69.6 THROMBOCYTOPENIA (HCC): Status: ACTIVE | Noted: 2020-12-23

## 2020-12-23 LAB — HBA1C MFR BLD: 6 %

## 2020-12-23 PROCEDURE — 83036 HEMOGLOBIN GLYCOSYLATED A1C: CPT | Performed by: INTERNAL MEDICINE

## 2020-12-23 PROCEDURE — 99214 OFFICE O/P EST MOD 30 MIN: CPT | Performed by: INTERNAL MEDICINE

## 2020-12-23 RX ORDER — CLINDAMYCIN HYDROCHLORIDE 300 MG/1
CAPSULE ORAL
COMMUNITY
Start: 2020-12-22 | End: 2021-01-07

## 2020-12-23 RX ORDER — CLOPIDOGREL BISULFATE 75 MG/1
75 TABLET ORAL DAILY
COMMUNITY
Start: 2020-10-11 | End: 2021-03-06 | Stop reason: HOSPADM

## 2020-12-23 NOTE — PROGRESS NOTES
Subjective   Tawny Shin is a 65 y.o. female.   Chief Complaint   Patient presents with   • Hyperlipidemia     A1C 6.0 today   • Hypertension       Is a follow-up patient's diabetes hypertension hyperlipidemia.  She has longstanding rheumatoid arthritis she is followed by Dr. Chaidez in [facility].  She has had some mishaps and some falls recently she is apparently being put on a walker for stability.       The following portions of the patient's history were reviewed and updated as appropriate: allergies, current medications, past family history, past medical history, past social history, past surgical history and problem list.    Review of Systems   Constitutional: Positive for fatigue. Negative for activity change, appetite change, fever, unexpected weight gain and unexpected weight loss.   HENT: Negative for swollen glands, trouble swallowing and voice change.    Eyes: Negative for blurred vision and visual disturbance.   Respiratory: Negative for cough and shortness of breath.    Cardiovascular: Negative for chest pain, palpitations and leg swelling.   Gastrointestinal: Negative for abdominal pain, constipation, diarrhea, nausea, vomiting and indigestion.   Endocrine: Negative for cold intolerance, heat intolerance, polydipsia and polyphagia.   Genitourinary: Negative for dysuria and frequency.   Musculoskeletal: Positive for arthralgias, back pain, gait problem, joint swelling and myalgias. Negative for neck pain.   Skin: Negative for color change, rash and skin lesions.   Neurological: Negative for dizziness, weakness, headache, memory problem and confusion.   Hematological: Does not bruise/bleed easily.   Psychiatric/Behavioral: Negative for agitation, hallucinations and suicidal ideas. The patient is not nervous/anxious.        Objective   Past Medical History:   Diagnosis Date   • Anemia of diabetes 6/25/2020   • Arthritis    • Asthma    • Chronic sinusitis    • Coronary artery disease    • Eustachian tube  dysfunction    • Heart disease    • Herpes simplex    • Hyperlipidemia    • Hypertension    • Knee dislocation    • Labral tear of right hip joint    • Laryngitis sicca    • Laryngitis, chronic    • MI (myocardial infarction) (CMS/HCC)    • Otorrhea    • Sensorineural hearing loss    • Sjogren's disease (CMS/HCC)       Past Surgical History:   Procedure Laterality Date   • A-V CARDIAC PACEMAKER INSERTION  2016   • ATRIAL CARDIAC PACEMAKER INSERTION     • CARDIAC CATHETERIZATION     • CATARACT EXTRACTION     • COLONOSCOPY  11/08/2011    One fold in the ascending colon which showed ulcer otherwise normal exam   • COLONOSCOPY  11/12/2004    Normal exam repeat in five years   • CORONARY ANGIOPLASTY WITH STENT PLACEMENT      X 2; 2013 & 2014   • ENDOSCOPY  07/10/2014    Normal exam   • LUMBAR FUSION N/A 1/19/2018    Procedure: L3-4,L4-5 DECOMPRESSION, POSTERIOR SPINAL FUSION WITH INSTRUMENTATION;  Surgeon: Fortino Oropeza MD;  Location:  [facility] OR;  Service:    • LUMBAR LAMINECTOMY WITH FUSION Left 1/17/2018    Procedure: LEFT L3-4 L4-5 LATERAL LUMBAR INTERBODY FUSION;  Surgeon: Fortino Oropeza MD;  Location:  [facility] OR;  Service:    • MYRINGOTOMY W/ TUBES  09/04/2014    TUBES NO LONGER IN PLACE   • OTHER SURGICAL HISTORY      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 so hardware was removed and spacers were placed   • REPLACEMENT TOTAL KNEE Right         Current Outpatient Medications:   •  aspirin 81 MG EC tablet, Take 81 mg by mouth Daily., Disp: , Rfl:   •  carvedilol (COREG) 12.5 MG tablet, Take 12.5 mg by mouth 2 (Two) Times a Day With Meals., Disp: , Rfl:   •  Certolizumab Pegol (Cimzia) 2 X 200 MG kit, Inject 400 mg under the skin into the appropriate area as directed Every 28 (Twenty-Eight) Days., Disp: , Rfl:   •  clindamycin (CLEOCIN) 300 MG capsule, , Disp: , Rfl:   •  cloNIDine (CATAPRES) 0.1 MG tablet, TAKE 2 TABLETS BY MOUTH EVERY NIGHT AT BEDTME AND AS NEEDED FOR BLOOD PRESSURE GREATER THAN 160/90, Disp: 105  tablet, Rfl: 3  •  clopidogrel (PLAVIX) 75 MG tablet, Take 75 mg by mouth Daily., Disp: , Rfl:   •  Evolocumab (REPATHA) 140 MG/ML solution prefilled syringe, Inject  under the skin into the appropriate area as directed., Disp: , Rfl:   •  ezetimibe (ZETIA) 10 MG tablet, Take 10 mg by mouth Daily., Disp: , Rfl:   •  fluticasone (FLOVENT DISKUS) 50 MCG/BLIST diskus inhaler, Inhale 1 puff 2 (Two) Times a Day., Disp: , Rfl:   •  folic acid (FOLVITE) 1 MG tablet, TAKE 1 TABLET BY MOUTH DAILY, Disp: 90 tablet, Rfl: 3  •  furosemide (LASIX) 40 MG tablet, Take 40 mg by mouth daily., Disp: , Rfl:   •  isosorbide mononitrate (IMDUR) 60 MG 24 hr tablet, Take 1 tablet by mouth 2 (Two) Times a Day., Disp: 180 tablet, Rfl: 4  •  leflunomide (ARAVA) 20 MG tablet, Take 20 mg by mouth Daily., Disp: , Rfl:   •  levothyroxine (SYNTHROID, LEVOTHROID) 25 MCG tablet, Take 25 mcg by mouth Daily., Disp: , Rfl:   •  MULTIPLE VITAMIN PO, Take 1 tablet by mouth daily.  , Disp: , Rfl:   •  nitroglycerin (NITROSTAT) 0.4 MG SL tablet, Place 1 tablet under the tongue every 5 minutes as needed for Chest pain, Disp: , Rfl:   •  pantoprazole (PROTONIX) 40 MG EC tablet, TAKE 1 TABLET BY MOUTH DAILY, Disp: 90 tablet, Rfl: 3  •  predniSONE (DELTASONE) 10 MG tablet, Take 1 tablet by mouth Daily. (Patient taking differently: Take 5 mg by mouth Daily.), Disp: 30 tablet, Rfl: 1  •  salsalate (DISALCID) 500 MG tablet, TAKE 5 TABLETS ON MONDAY, WEDNESDAY, FRIDAY, AND SATURDAY, AND TAKE 4 TABLETS ON TUESDAYS, THURSDAYS, AND SUNDAYS, Disp: 416 tablet, Rfl: 2  •  spironolactone (ALDACTONE) 25 MG tablet, TAKE 1 TABLET BY MOUTH DAILY, Disp: 90 tablet, Rfl: 3  •  traMADol-acetaminophen (ULTRACET) 37.5-325 MG per tablet, TAKE 1 TABLET BY MOUTH TWICE DAILY AS NEEDED FOR MODERATE PAIN, Disp: 120 tablet, Rfl: 1  •  valACYclovir (VALTREX) 500 MG tablet, Take 1 tablet by mouth Daily., Disp: 30 tablet, Rfl: 0  •  vitamin D (ERGOCALCIFEROL) 1.25 MG (53268 UT) capsule  capsule, Take 1 capsule by mouth Every 7 (Seven) Days., Disp: 12 capsule, Rfl: 3  •  guaiFENesin (MUCINEX) 600 MG 12 hr tablet, Take  by mouth., Disp: , Rfl:   •  levothyroxine (SYNTHROID, LEVOTHROID) 75 MCG tablet, , Disp: , Rfl:   •  ticagrelor (BRILINTA) 90 MG tablet tablet, Take 90 mg by mouth 2 (Two) Times a Day., Disp: , Rfl:      Vitals:    12/23/20 1443   BP: 136/80   Pulse: 93   Resp: 16   Temp: 97.1 °F (36.2 °C)   SpO2: 97%         12/23/20  1443   Weight: 95.1 kg (209 lb 9.6 oz)     Patient's Body mass index is 33.83 kg/m². BMI is .      Physical Exam  Constitutional:       Appearance: Normal appearance. She is well-developed.   HENT:      Head: Normocephalic and atraumatic.      Right Ear: External ear normal.      Left Ear: External ear normal.   Eyes:      Extraocular Movements: Extraocular movements intact.      Conjunctiva/sclera: Conjunctivae normal.      Pupils: Pupils are equal, round, and reactive to light.   Neck:      Musculoskeletal: Normal range of motion and neck supple. No neck rigidity.      Vascular: No carotid bruit.   Cardiovascular:      Rate and Rhythm: Normal rate and regular rhythm.      Pulses: Normal pulses.      Heart sounds: Normal heart sounds. No murmur. No gallop.    Pulmonary:      Effort: Pulmonary effort is normal.      Breath sounds: Normal breath sounds.   Musculoskeletal:         General: Swelling and tenderness present.      Comments: She has multiple joints affected by her RA with deformity including a right knee which is larger than the left secondary to failed total knee replacement.   Skin:     General: Skin is warm and dry.   Neurological:      General: No focal deficit present.      Mental Status: She is alert and oriented to person, place, and time.   Psychiatric:         Mood and Affect: Mood normal.         Behavior: Behavior normal.               Assessment/Plan   Diagnoses and all orders for this visit:    1. Encounter for diabetic foot exam (CMS/HCC)  (Primary)  -     POC Glycosylated Hemoglobin (Hb A1C)    2. Rheumatoid arthritis involving multiple sites with positive rheumatoid factor (CMS/HCC)    3. Coronary artery disease involving native coronary artery of native heart without angina pectoris  -     Basic Metabolic Panel    4. Postmenopausal  -     DEXA Bone Density Axial; Future    5. Essential hypertension    6. Seropositive rheumatoid arthritis of multiple sites (CMS/HCC)    Other orders  -     Cancel: traMADol-acetaminophen (ULTRACET) 37.5-325 MG per tablet; Take 1 tablet by mouth 2 (Two) Times a Day As Needed for Moderate Pain .  Dispense: 120 tablet; Refill: 1        This point patient is being scheduled for a bone density scan as well as a BMP to ascertain her renal function and her potassium levels.  Tawny is followed by her rheumatologist for most of her joint problems who we did talk about orthopedic surgery and apparently is planning to see Dr. Carlson who has been evaluating her right hip with multiple muscle tears

## 2020-12-24 LAB
BUN SERPL-MCNC: 18 MG/DL (ref 8–23)
BUN/CREAT SERPL: 19.8 (ref 7–25)
CALCIUM SERPL-MCNC: 9.3 MG/DL (ref 8.6–10.5)
CHLORIDE SERPL-SCNC: 99 MMOL/L (ref 98–107)
CO2 SERPL-SCNC: 28.2 MMOL/L (ref 22–29)
CREAT SERPL-MCNC: 0.91 MG/DL (ref 0.57–1)
GLUCOSE SERPL-MCNC: 144 MG/DL (ref 65–99)
POTASSIUM SERPL-SCNC: 3.9 MMOL/L (ref 3.5–5.2)
SODIUM SERPL-SCNC: 138 MMOL/L (ref 136–145)

## 2021-01-07 ENCOUNTER — APPOINTMENT (OUTPATIENT)
Dept: PREADMISSION TESTING | Facility: HOSPITAL | Age: 66
End: 2021-01-07

## 2021-01-07 ENCOUNTER — CLINICAL SUPPORT NO REQUIREMENTS (OUTPATIENT)
Dept: CARDIOLOGY | Facility: CLINIC | Age: 66
End: 2021-01-07

## 2021-01-07 VITALS
DIASTOLIC BLOOD PRESSURE: 65 MMHG | HEIGHT: 66 IN | HEART RATE: 91 BPM | BODY MASS INDEX: 33.62 KG/M2 | RESPIRATION RATE: 16 BRPM | OXYGEN SATURATION: 93 % | WEIGHT: 209.22 LBS | SYSTOLIC BLOOD PRESSURE: 145 MMHG

## 2021-01-07 DIAGNOSIS — I49.5 SICK SINUS SYNDROME (HCC): ICD-10-CM

## 2021-01-07 LAB
ANION GAP SERPL CALCULATED.3IONS-SCNC: 12 MMOL/L (ref 5–15)
BUN SERPL-MCNC: 21 MG/DL (ref 8–23)
BUN/CREAT SERPL: 25.3 (ref 7–25)
CALCIUM SPEC-SCNC: 9.3 MG/DL (ref 8.6–10.5)
CHLORIDE SERPL-SCNC: 100 MMOL/L (ref 98–107)
CO2 SERPL-SCNC: 29 MMOL/L (ref 22–29)
CREAT SERPL-MCNC: 0.83 MG/DL (ref 0.57–1)
DEPRECATED RDW RBC AUTO: 52.9 FL (ref 37–54)
ERYTHROCYTE [DISTWIDTH] IN BLOOD BY AUTOMATED COUNT: 15.6 % (ref 12.3–15.4)
GFR SERPL CREATININE-BSD FRML MDRD: 69 ML/MIN/1.73
GLUCOSE SERPL-MCNC: 96 MG/DL (ref 65–99)
HCT VFR BLD AUTO: 39.7 % (ref 34–46.6)
HGB BLD-MCNC: 12.5 G/DL (ref 12–15.9)
MCH RBC QN AUTO: 29.4 PG (ref 26.6–33)
MCHC RBC AUTO-ENTMCNC: 31.5 G/DL (ref 31.5–35.7)
MCV RBC AUTO: 93.4 FL (ref 79–97)
PLATELET # BLD AUTO: 176 10*3/MM3 (ref 140–450)
PMV BLD AUTO: 10.4 FL (ref 6–12)
POTASSIUM SERPL-SCNC: 4 MMOL/L (ref 3.5–5.2)
RBC # BLD AUTO: 4.25 10*6/MM3 (ref 3.77–5.28)
SODIUM SERPL-SCNC: 141 MMOL/L (ref 136–145)
WBC # BLD AUTO: 5.21 10*3/MM3 (ref 3.4–10.8)

## 2021-01-07 PROCEDURE — 80048 BASIC METABOLIC PNL TOTAL CA: CPT

## 2021-01-07 PROCEDURE — 85027 COMPLETE CBC AUTOMATED: CPT

## 2021-01-07 PROCEDURE — 93288 INTERROG EVL PM/LDLS PM IP: CPT | Performed by: PHYSICIAN ASSISTANT

## 2021-01-07 PROCEDURE — 36415 COLL VENOUS BLD VENIPUNCTURE: CPT

## 2021-01-07 NOTE — DISCHARGE INSTRUCTIONS
DAY OF SURGERY INSTRUCTIONS        YOUR SURGEON: ***Dr. Carlson    PROCEDURE: ***gluteus repair    DATE OF SURGERY: ***1/14/21    ARRIVAL TIME: AS DIRECTED BY OFFICE    YOU MAY TAKE THE FOLLOWING MEDICATION(S) THE MORNING OF SURGERY WITH A SIP OF WATER: ***carvedilol, tramadol      ALL OTHER HOME MEDICATION CHECK WITH YOUR PHYSICIAN      DO NOT TAKE ANY ERECTILE DYSFUNCTION MEDICATIONS (EX: CIALIS, VIAGRA) 24 HOURS PRIOR TO SURGERY                      MANAGING PAIN AFTER SURGERY    We know you are probably wondering what your pain will be like after surgery.  Following surgery it is unrealistic to expect you will not have pain.   Pain is how our bodies let us know that something is wrong or cautions us to be careful.  That said, our goal is to make your pain tolerable.    Methods we may use to treat your pain include (oral or IV medications, PCAs, epidurals, nerve blocks, etc.)   While some procedures require IV pain medications for a short time after surgery, transitioning to pain medications by mouth allows for better management of pain.   Your nurse will encourage you to take oral pain medications whenever possible.  IV medications work almost immediately, but only last a short while.  Taking medications by mouth allows for a more constant level of medication in your blood stream for a longer period of time.      Once your pain is out of control it is harder to get back under control.  It is important you are aware when your next dose of pain medication is due.  If you are admitted, your nurse may write the time of your next dose on the white board in your room to help you remember.      We are interested in your pain and encourage you to inform us about aggravating factors during your visit.   Many times a simple repositioning every few hours can make a big difference.    If your physician says it is okay, do not let your pain prevent you from getting out of bed. Be sure to call your nurse for assistance prior  to getting up so you do not fall.      Before surgery, please decide your tolerable pain goal.  These faces help describe the pain ratings we use on a 0-10 scale.   Be prepared to tell us your goal and whether or not you take pain or anxiety medications at home.          BEFORE YOU COME TO THE HOSPITAL  (Pre-op instructions)  • Do not eat, drink, smoke or chew gum after midnight the night before surgery.  This also includes no mints.  • Morning of surgery take only the medicines you have been instructed with a sip of water unless otherwise instructed  by your physician.  • Do not shave, wear makeup or dark nail polish.  • Remove all jewelry including rings.  • Leave anything you consider valuable at home.  • Leave your suitcase in the car until after your surgery.  • Bring the following with you if applicable:  o Picture ID and insurance, Medicare or Medicaid cards  o Co-pay/deductible required by insurance (cash, check, credit card)  o Copy of advance directive, living will or power-of- documents if not brought to PAT  o CPAP or BIPAP mask and tubing  o Relaxation aids ( book, magazine), etc.  o Hearing aids                        ON THE DAY OF SURGERY  · On the day of surgery check in at registration located at the main entrance of the hospital.   ? You will be registered and given a beeper with instructions where to wait in the main lobby.  ? When your beeper lights up and vibrates a member of the Outpatient Surgery staff will meet you at the double doors under the stair steps and escort you to your preoperative room.   · You may have cloth compression devices placed on your legs. These help to prevent blood clots and reduce swelling in your legs.  · An IV may be inserted into one of your veins.  · In the operating room, you may be given one or more of the following:  ? A medicine to help you relax (sedative).  ? A medicine to numb the area (local anesthetic).  ? A medicine to make you fall asleep  "(general anesthetic).  ? A medicine that is injected into an area of your body to numb everything below the injection site (regional anesthetic).  · Your surgical site will be marked or identified.  · You may be given an antibiotic through your IV to help prevent infection.  Contact a health care provider if you:  · Develop a fever of more than 100.4°F (38°C) or other feelings of illness during the 48 hours before your surgery.  · Have symptoms that get worse.  Have questions or concerns about your surgery    General Anesthesia/Surgery, Adult  General anesthesia is the use of medicines to make a person \"go to sleep\" (unconscious) for a medical procedure. General anesthesia must be used for certain procedures, and is often recommended for procedures that:  · Last a long time.  · Require you to be still or in an unusual position.  · Are major and can cause blood loss.  The medicines used for general anesthesia are called general anesthetics. As well as making you unconscious for a certain amount of time, these medicines:  · Prevent pain.  · Control your blood pressure.  · Relax your muscles.  Tell a health care provider about:  · Any allergies you have.  · All medicines you are taking, including vitamins, herbs, eye drops, creams, and over-the-counter medicines.  · Any problems you or family members have had with anesthetic medicines.  · Types of anesthetics you have had in the past.  · Any blood disorders you have.  · Any surgeries you have had.  · Any medical conditions you have.  · Any recent upper respiratory, chest, or ear infections.  · Any history of:  ? Heart or lung conditions, such as heart failure, sleep apnea, asthma, or chronic obstructive pulmonary disease (COPD).  ?  service.  ? Depression or anxiety.  · Any tobacco or drug use, including marijuana or alcohol use.  · Whether you are pregnant or may be pregnant.  What are the risks?  Generally, this is a safe procedure. However, problems may " occur, including:  · Allergic reaction.  · Lung and heart problems.  · Inhaling food or liquid from the stomach into the lungs (aspiration).  · Nerve injury.  · Air in the bloodstream, which can lead to stroke.  · Extreme agitation or confusion (delirium) when you wake up from the anesthetic.  · Waking up during your procedure and being unable to move. This is rare.  These problems are more likely to develop if you are having a major surgery or if you have an advanced or serious medical condition. You can prevent some of these complications by answering all of your health care provider's questions thoroughly and by following all instructions before your procedure.  General anesthesia can cause side effects, including:  · Nausea or vomiting.  · A sore throat from the breathing tube.  · Hoarseness.  · Wheezing or coughing.  · Shaking chills.  · Tiredness.  · Body aches.  · Anxiety.  · Sleepiness or drowsiness.  · Confusion or agitation.  RISKS AND COMPLICATIONS OF SURGERY  Your health care provider will discuss possible risks and complications with you before surgery. Common risks and complications include:    · Problems due to the use of anesthetics.  · Blood loss and replacement (does not apply to minor surgical procedures).  · Temporary increase in pain due to surgery.  · Uncorrected pain or problems that the surgery was meant to correct.  · Infection.  · New damage.    What happens before the procedure?    Medicines  Ask your health care provider about:  · Changing or stopping your regular medicines. This is especially important if you are taking diabetes medicines or blood thinners.  · Taking medicines such as aspirin and ibuprofen. These medicines can thin your blood. Do not take these medicines unless your health care provider tells you to take them.  · Taking over-the-counter medicines, vitamins, herbs, and supplements. Do not take these during the week before your procedure unless your health care provider  approves them.  General instructions  · Starting 3-6 weeks before the procedure, do not use any products that contain nicotine or tobacco, such as cigarettes and e-cigarettes. If you need help quitting, ask your health care provider.  · If you brush your teeth on the morning of the procedure, make sure to spit out all of the toothpaste.  · Tell your health care provider if you become ill or develop a cold, cough, or fever.  · If instructed by your health care provider, bring your sleep apnea device with you on the day of your surgery (if applicable).  · Ask your health care provider if you will be going home the same day, the following day, or after a longer hospital stay.  ? Plan to have someone take you home from the hospital or clinic.  ? Plan to have a responsible adult care for you for at least 24 hours after you leave the hospital or clinic. This is important.  What happens during the procedure?  · You will be given anesthetics through both of the following:  ? A mask placed over your nose and mouth.  ? An IV in one of your veins.  · You may receive a medicine to help you relax (sedative).  · After you are unconscious, a breathing tube may be inserted down your throat to help you breathe. This will be removed before you wake up.  · An anesthesia specialist will stay with you throughout your procedure. He or she will:  ? Keep you comfortable and safe by continuing to give you medicines and adjusting the amount of medicine that you get.  ? Monitor your blood pressure, pulse, and oxygen levels to make sure that the anesthetics do not cause any problems.  The procedure may vary among health care providers and hospitals.  What happens after the procedure?  · Your blood pressure, temperature, heart rate, breathing rate, and blood oxygen level will be monitored until the medicines you were given have worn off.  · You will wake up in a recovery area. You may wake up slowly.  · If you feel anxious or agitated, you may  be given medicine to help you calm down.  · If you will be going home the same day, your health care provider may check to make sure you can walk, drink, and urinate.  · Your health care provider will treat any pain or side effects you have before you go home.  · Do not drive for 24 hours if you were given a sedative.  Summary  · General anesthesia is used to keep you still and prevent pain during a procedure.  · It is important to tell your healthcare provider about your medical history and any surgeries you have had, and previous experience with anesthesia.  · Follow your healthcare provider’s instructions about when to stop eating, drinking, or taking certain medicines before your procedure.  · Plan to have someone take you home from the hospital or clinic.  This information is not intended to replace advice given to you by your health care provider. Make sure you discuss any questions you have with your health care provider.  Document Released: 03/26/2009 Document Revised: 08/03/2018 Document Reviewed: 08/03/2018  [employer] Interactive Patient Education © 2019 [employer]       Fall Prevention in Hospitals, Adult  As a hospital patient, your condition and the treatments you receive can increase your risk for falls. Some additional risk factors for falls in a hospital include:  · Being in an unfamiliar environment.  · Being on bed rest.  · Your surgery.  · Taking certain medicines.  · Your tubing requirements, such as intravenous (IV) therapy or catheters.  It is important that you learn how to decrease fall risks while at the hospital. Below are important tips that can help prevent falls.  SAFETY TIPS FOR PREVENTING FALLS  Talk about your risk of falling.  · Ask your health care provider why you are at risk for falling. Is it your medicine, illness, tubing placement, or something else?  · Make a plan with your health care provider to keep you safe from falls.  · Ask your health care provider or pharmacist about  side effects of your medicines. Some medicines can make you dizzy or affect your coordination.  Ask for help.  · Ask for help before getting out of bed. You may need to press your call button.  · Ask for assistance in getting safely to the toilet.  · Ask for a walker or cane to be put at your bedside. Ask that most of the side rails on your bed be placed up before your health care provider leaves the room.  · Ask family or friends to sit with you.  · Ask for things that are out of your reach, such as your glasses, hearing aids, telephone, bedside table, or call button.  Follow these tips to avoid falling:  · Stay lying or seated, rather than standing, while waiting for help.  · Wear rubber-soled slippers or shoes whenever you walk in the hospital.  · Avoid quick, sudden movements.  ¨ Change positions slowly.  ¨ Sit on the side of your bed before standing.  ¨ Stand up slowly and wait before you start to walk.  · Let your health care provider know if there is a spill on the floor.  · Pay careful attention to the medical equipment, electrical cords, and tubes around you.  · When you need help, use your call button by your bed or in the bathroom. Wait for one of your health care providers to help you.  · If you feel dizzy or unsure of your footing, return to bed and wait for assistance.  · Avoid being distracted by the TV, telephone, or another person in your room.  · Do not lean or support yourself on rolling objects, such as IV poles or bedside tables.     This information is not intended to replace advice given to you by your health care provider. Make sure you discuss any questions you have with your health care provider.     Document Released: 12/15/2001 Document Revised: 01/08/2016 Document Reviewed: 08/25/2013  [employer] Interactive Patient Education ©2016 [employer]       [facility]  CHG 4% Patient Instruction Sheet    Chlorhexidine Before Surgery  Chlorhexidine gluconate (CHG) is a germ-killing  (antiseptic) solution that is used to clean the skin. It gets rid of the bacteria that normally live on the skin. Cleaning your skin with CHG before surgery helps lower the risk for infection after surgery.    How to use CHG solution  · You will take 2 showers, one shower the night before surgery, the second shower the morning of surgery before coming to the hospital.  · Use CHG only as told by your health care provider, and follow the instructions on the label.  · Use CHG solution while taking a shower. Follow these steps when using CHG solution (unless your health care provider gives you different instructions):  1. Start the shower.  2. Use your normal soap and shampoo to wash your face and hair.  3. Turn off the shower or move out of the shower stream.  4. Pour the CHG onto a clean washcloth. Do not use any type of brush or rough-edged sponge.  5. Starting at your neck, lather your body down to your toes. Make sure you:  6. Pay special attention to the part of your body where you will be having surgery. Scrub this area for at least 1 minute.  7. Use the full amount of CHG as directed. Usually, this is one half bottle for each shower.  8. Do not use CHG on your head or face. If the solution gets into your ears or eyes, rinse them well with water.  9. Avoid your genital area.  10. Avoid any areas of skin that have broken skin, cuts, or scrapes.  11. Scrub your back and under your arms. Make sure to wash skin folds.  12. Let the lather sit on your skin for 1-2 minutes or as long as told by your health care  provider.  13. Thoroughly rinse your entire body in the shower. Make sure that all body creases and crevices are rinsed well.  14. Dry off with a clean towel. Do not put any substances on your body afterward, such as powder, lotion, or perfume.  15. Put on clean clothes or pajamas.  16. If it is the night before your surgery, sleep in clean sheets.    What are the risks?  Risks of using CHG include:  · A skin  reaction.  · Hearing loss, if CHG gets in your ears.  · Eye injury, if CHG gets in your eyes and is not rinsed out.  · The CHG product catching fire.  Make sure that you avoid smoking and flames after applying CHG to your skin.  Do not use CHG:  · If you have a chlorhexidine allergy or have previously reacted to chlorhexidine.  · On babies younger than 2 months of age.      On the day of surgery, when you are taken to your room in Outpatient Surgery you will be given a CHG prepackaged cloth to wipe the site for your surgery.  How to use CHG prepackaged cloths  · Follow the instructions on the label.  · Use the CHG cloth on clean, dry skin. Follow these steps when using a CHG cloth (unless your health care provider gives you different instructions):  1. Using the CHG cloth, vigorously scrub the part of your body where you will be having surgery. Scrub using a back-and-forth motion for 3 minutes. The area on your body should be completely wet with CHG when you are finished scrubbing.  2. Do not rinse. Discard the cloth and let the area air-dry for 1 minute. Do not put any substances on your body afterward, such as powder, lotion, or perfume.  Contact a health care provider if:  · Your skin gets irritated after scrubbing.  · You have questions about using your solution or cloth.  Get help right away if:  · Your eyes become very red or swollen.  · Your eyes itch badly.  · Your skin itches badly and is red or swollen.  · Your hearing changes.  · You have trouble seeing.  · You have swelling or tingling in your mouth or throat.  · You have trouble breathing.  · You swallow any chlorhexidine.  Summary  · Chlorhexidine gluconate (CHG) is a germ-killing (antiseptic) solution that is used to clean the skin. Cleaning your skin with CHG before surgery helps lower the risk for infection after surgery.  · You may be given CHG to use at home. It may be in a bottle or in a prepackaged cloth to use on your skin. Carefully follow  your health care provider's instructions and the instructions on the product label.  · Do not use CHG if you have a chlorhexidine allergy.  · Contact your health care provider if your skin gets irritated after scrubbing.  This information is not intended to replace advice given to you by your health care provider. Make sure you discuss any questions you have with your health care provider.  Document Released: 09/11/2013 Document Revised: 11/15/2018 Document Reviewed: 11/15/2018  [employer] Interactive Patient Education © 2019 [employer]

## 2021-01-08 ENCOUNTER — TRANSCRIBE ORDERS (OUTPATIENT)
Dept: ADMINISTRATIVE | Facility: HOSPITAL | Age: 66
End: 2021-01-08

## 2021-01-08 DIAGNOSIS — Z11.59 SCREENING FOR VIRAL DISEASE: Primary | ICD-10-CM

## 2021-01-12 ENCOUNTER — TRANSCRIBE ORDERS (OUTPATIENT)
Dept: ADMINISTRATIVE | Facility: HOSPITAL | Age: 66
End: 2021-01-12

## 2021-01-12 ENCOUNTER — LAB (OUTPATIENT)
Dept: LAB | Facility: HOSPITAL | Age: 66
End: 2021-01-12

## 2021-01-12 DIAGNOSIS — Z11.59 SCREENING FOR VIRAL DISEASE: Primary | ICD-10-CM

## 2021-01-12 LAB — SARS-COV-2 ORF1AB RESP QL NAA+PROBE: NOT DETECTED

## 2021-01-12 PROCEDURE — U0004 COV-19 TEST NON-CDC HGH THRU: HCPCS | Performed by: ORTHOPAEDIC SURGERY

## 2021-01-12 PROCEDURE — C9803 HOPD COVID-19 SPEC COLLECT: HCPCS | Performed by: ORTHOPAEDIC SURGERY

## 2021-01-12 NOTE — PROGRESS NOTES
Dual Chamber Pacemaker Evaluation Report  Clinic Interrogation    January 12, 2021    Primary Cardiologist: David  : Medtronic Model: Alphonsocam PAGE [id number]  Implant date: 11/17/16    Reason for evaluation: routine  Indication for pacemaker: sick sinus syndrome    Measurements  Atrial sensing - P wave: 2.4 mV  Atrial threshold: 0.75 V@ 0.4 ms  Atrial lead impedance: 494 ohms  Ventricular sensing - R wave: 15.8 mV  Ventricular threshold: 0.75 V @ 0.4 ms  Ventricular lead impedance:   760 ohms     Diagnostic Data  Atrial paced: 23.2 %  Ventricular paced: 0.1 %  Other: 1 sec run of SVT  Battery status: satisfactory         Final Parameters  Mode:  AAIR+  Lower rate: 60 bpm   Upper rate: 130 bpm  AV Delay: paced- 180 ms  Sensed-150 ms  Atrial - Amplitude: 1.5 V   Pulse width: 0.4 ms   Sensitivity: 0.3 mV     Ventricular - Amplitude: 2.0 V  Pulse width: 0.4 ms  Sensitivity: 0.9 mV    Changes made: none  Conclusions: normal pacemaker function    Follow up: 6 months via [facility], annually in office

## 2021-01-13 ENCOUNTER — TELEPHONE (OUTPATIENT)
Dept: CARDIOLOGY | Facility: CLINIC | Age: 66
End: 2021-01-13

## 2021-01-13 NOTE — TELEPHONE ENCOUNTER
Patient called and left a voicemail asking if we received the patient assistance form she mailed us for her Rep[employer]?  She can be reached at [phone].  Thank you!

## 2021-01-14 ENCOUNTER — ANESTHESIA (OUTPATIENT)
Dept: PERIOP | Facility: HOSPITAL | Age: 66
End: 2021-01-14

## 2021-01-14 ENCOUNTER — HOSPITAL ENCOUNTER (OUTPATIENT)
Facility: HOSPITAL | Age: 66
Discharge: HOME | End: 2021-01-16
Attending: ORTHOPAEDIC SURGERY | Admitting: ORTHOPAEDIC SURGERY

## 2021-01-14 ENCOUNTER — ANESTHESIA EVENT (OUTPATIENT)
Dept: PERIOP | Facility: HOSPITAL | Age: 66
End: 2021-01-14

## 2021-01-14 DIAGNOSIS — T14.8XXA MUSCLE TEAR: Primary | ICD-10-CM

## 2021-01-14 DIAGNOSIS — Z74.09 IMPAIRED MOBILITY: ICD-10-CM

## 2021-01-14 DIAGNOSIS — Z78.9 DECREASED ACTIVITIES OF DAILY LIVING (ADL): ICD-10-CM

## 2021-01-14 DIAGNOSIS — I10 ESSENTIAL HYPERTENSION: ICD-10-CM

## 2021-01-14 DIAGNOSIS — R55 SYNCOPE, CARDIOGENIC: ICD-10-CM

## 2021-01-14 PROBLEM — S76.019A: Status: ACTIVE | Noted: 2021-01-14

## 2021-01-14 LAB — GLUCOSE BLDC GLUCOMTR-MCNC: 118 MG/DL (ref 70–130)

## 2021-01-14 PROCEDURE — 25010000002 PROPOFOL 10 MG/ML EMULSION: Performed by: NURSE ANESTHETIST, CERTIFIED REGISTERED

## 2021-01-14 PROCEDURE — 63710000001 GUAIFENESIN 600 MG TABLET SUSTAINED-RELEASE 12 HOUR: Performed by: ORTHOPAEDIC SURGERY

## 2021-01-14 PROCEDURE — 25010000002 CEFAZOLIN PER 500 MG: Performed by: ORTHOPAEDIC SURGERY

## 2021-01-14 PROCEDURE — A9270 NON-COVERED ITEM OR SERVICE: HCPCS | Performed by: ORTHOPAEDIC SURGERY

## 2021-01-14 PROCEDURE — G0378 HOSPITAL OBSERVATION PER HR: HCPCS

## 2021-01-14 PROCEDURE — 63710000001 ISOSORBIDE MONONITRATE 60 MG TABLET SUSTAINED-RELEASE 24 HOUR: Performed by: ORTHOPAEDIC SURGERY

## 2021-01-14 PROCEDURE — 63710000001 OXYCODONE-ACETAMINOPHEN 7.5-325 MG TABLET: Performed by: ANESTHESIOLOGY

## 2021-01-14 PROCEDURE — 63710000001 CLONIDINE 0.2 MG TABLET: Performed by: ORTHOPAEDIC SURGERY

## 2021-01-14 PROCEDURE — 94799 UNLISTED PULMONARY SVC/PX: CPT

## 2021-01-14 PROCEDURE — 25010000002 ONDANSETRON PER 1 MG: Performed by: NURSE ANESTHETIST, CERTIFIED REGISTERED

## 2021-01-14 PROCEDURE — 63710000001 PANTOPRAZOLE 40 MG TABLET DELAYED-RELEASE: Performed by: ORTHOPAEDIC SURGERY

## 2021-01-14 PROCEDURE — 63710000001 FOLIC ACID 1 MG TABLET: Performed by: ORTHOPAEDIC SURGERY

## 2021-01-14 PROCEDURE — 25010000002 FENTANYL CITRATE (PF) 100 MCG/2ML SOLUTION: Performed by: ANESTHESIOLOGY

## 2021-01-14 PROCEDURE — 25010000002 DEXAMETHASONE PER 1 MG: Performed by: NURSE ANESTHETIST, CERTIFIED REGISTERED

## 2021-01-14 PROCEDURE — 94640 AIRWAY INHALATION TREATMENT: CPT

## 2021-01-14 PROCEDURE — 25010000002 SUCCINYLCHOLINE PER 20 MG: Performed by: NURSE ANESTHETIST, CERTIFIED REGISTERED

## 2021-01-14 PROCEDURE — 82962 GLUCOSE BLOOD TEST: CPT

## 2021-01-14 PROCEDURE — 63710000001 CLOPIDOGREL 75 MG TABLET: Performed by: ORTHOPAEDIC SURGERY

## 2021-01-14 PROCEDURE — 63710000001 ASPIRIN 81 MG TABLET DELAYED-RELEASE: Performed by: ORTHOPAEDIC SURGERY

## 2021-01-14 PROCEDURE — 63710000001 LEFLUNOMIDE 20 MG TABLET: Performed by: ORTHOPAEDIC SURGERY

## 2021-01-14 PROCEDURE — C1763 CONN TISS, NON-HUMAN: HCPCS | Performed by: ORTHOPAEDIC SURGERY

## 2021-01-14 PROCEDURE — C1713 ANCHOR/SCREW BN/BN,TIS/BN: HCPCS | Performed by: ORTHOPAEDIC SURGERY

## 2021-01-14 PROCEDURE — 63710000001 HYDROCODONE-ACETAMINOPHEN 10-325 MG TABLET: Performed by: ORTHOPAEDIC SURGERY

## 2021-01-14 PROCEDURE — 63710000001 CARVEDILOL 6.25 MG TABLET: Performed by: ORTHOPAEDIC SURGERY

## 2021-01-14 PROCEDURE — 25010000002 ROPIVACAINE PER 1 MG: Performed by: ORTHOPAEDIC SURGERY

## 2021-01-14 PROCEDURE — 63710000001 HYDROCODONE-ACETAMINOPHEN 5-325 MG TABLET: Performed by: ORTHOPAEDIC SURGERY

## 2021-01-14 PROCEDURE — 63710000001 PREDNISONE PER 5 MG: Performed by: ORTHOPAEDIC SURGERY

## 2021-01-14 PROCEDURE — A9270 NON-COVERED ITEM OR SERVICE: HCPCS | Performed by: ANESTHESIOLOGY

## 2021-01-14 DEVICE — SUT/ANCH JUGGERKNOT SFT 2.9MM: Type: IMPLANTABLE DEVICE | Site: HIP | Status: FUNCTIONAL

## 2021-01-14 DEVICE — IMPLANTABLE DEVICE
Type: IMPLANTABLE DEVICE | Site: HIP | Status: FUNCTIONAL
Brand: BIOINDUCTIVE IMPLANT WITH ARTHROSCOPIC DELIVERY SYSTEM - LARGE

## 2021-01-14 DEVICE — IMPLANTABLE DEVICE
Type: IMPLANTABLE DEVICE | Site: HIP | Status: FUNCTIONAL
Brand: QUATTRO® LINK KNOTLESS ANCHOR

## 2021-01-14 RX ORDER — CEFAZOLIN SODIUM IN 0.9 % NACL 3 G/100 ML
3 INTRAVENOUS SOLUTION, PIGGYBACK (ML) INTRAVENOUS EVERY 8 HOURS
Status: COMPLETED | OUTPATIENT
Start: 2021-01-14 | End: 2021-01-15

## 2021-01-14 RX ORDER — ACETAMINOPHEN 500 MG
1000 TABLET ORAL ONCE
Status: COMPLETED | OUTPATIENT
Start: 2021-01-14 | End: 2021-01-14

## 2021-01-14 RX ORDER — SODIUM CHLORIDE 9 MG/ML
100 INJECTION, SOLUTION INTRAVENOUS CONTINUOUS
Status: DISCONTINUED | OUTPATIENT
Start: 2021-01-14 | End: 2021-01-16 | Stop reason: HOSPADM

## 2021-01-14 RX ORDER — BUDESONIDE 0.5 MG/2ML
0.5 INHALANT ORAL
Status: DISCONTINUED | OUTPATIENT
Start: 2021-01-14 | End: 2021-01-16 | Stop reason: HOSPADM

## 2021-01-14 RX ORDER — ISOSORBIDE MONONITRATE 60 MG/1
60 TABLET, EXTENDED RELEASE ORAL 2 TIMES DAILY
Status: DISCONTINUED | OUTPATIENT
Start: 2021-01-14 | End: 2021-01-16 | Stop reason: HOSPADM

## 2021-01-14 RX ORDER — ASPIRIN 81 MG/1
81 TABLET ORAL DAILY
Status: DISCONTINUED | OUTPATIENT
Start: 2021-01-14 | End: 2021-01-16 | Stop reason: HOSPADM

## 2021-01-14 RX ORDER — ROPIVACAINE HYDROCHLORIDE 5 MG/ML
INJECTION, SOLUTION EPIDURAL; INFILTRATION; PERINEURAL AS NEEDED
Status: DISCONTINUED | OUTPATIENT
Start: 2021-01-14 | End: 2021-01-14 | Stop reason: HOSPADM

## 2021-01-14 RX ORDER — SODIUM CHLORIDE, SODIUM LACTATE, POTASSIUM CHLORIDE, CALCIUM CHLORIDE 600; 310; 30; 20 MG/100ML; MG/100ML; MG/100ML; MG/100ML
100 INJECTION, SOLUTION INTRAVENOUS CONTINUOUS PRN
Status: DISCONTINUED | OUTPATIENT
Start: 2021-01-14 | End: 2021-01-14 | Stop reason: HOSPADM

## 2021-01-14 RX ORDER — PROMETHAZINE HYDROCHLORIDE 25 MG/1
12.5 TABLET ORAL EVERY 4 HOURS PRN
Status: DISCONTINUED | OUTPATIENT
Start: 2021-01-14 | End: 2021-01-16 | Stop reason: HOSPADM

## 2021-01-14 RX ORDER — CLOPIDOGREL BISULFATE 75 MG/1
75 TABLET ORAL DAILY
Status: DISCONTINUED | OUTPATIENT
Start: 2021-01-14 | End: 2021-01-16 | Stop reason: HOSPADM

## 2021-01-14 RX ORDER — SODIUM CHLORIDE, SODIUM LACTATE, POTASSIUM CHLORIDE, CALCIUM CHLORIDE 600; 310; 30; 20 MG/100ML; MG/100ML; MG/100ML; MG/100ML
100 INJECTION, SOLUTION INTRAVENOUS CONTINUOUS
Status: DISCONTINUED | OUTPATIENT
Start: 2021-01-14 | End: 2021-01-14 | Stop reason: HOSPADM

## 2021-01-14 RX ORDER — LEFLUNOMIDE 20 MG/1
20 TABLET ORAL DAILY
Status: DISCONTINUED | OUTPATIENT
Start: 2021-01-14 | End: 2021-01-16 | Stop reason: HOSPADM

## 2021-01-14 RX ORDER — SODIUM CHLORIDE 0.9 % (FLUSH) 0.9 %
3 SYRINGE (ML) INJECTION EVERY 12 HOURS SCHEDULED
Status: DISCONTINUED | OUTPATIENT
Start: 2021-01-14 | End: 2021-01-14 | Stop reason: HOSPADM

## 2021-01-14 RX ORDER — OXYCODONE AND ACETAMINOPHEN 10; 325 MG/1; MG/1
1 TABLET ORAL ONCE AS NEEDED
Status: DISCONTINUED | OUTPATIENT
Start: 2021-01-14 | End: 2021-01-14 | Stop reason: HOSPADM

## 2021-01-14 RX ORDER — NALOXONE HCL 0.4 MG/ML
0.4 VIAL (ML) INJECTION AS NEEDED
Status: DISCONTINUED | OUTPATIENT
Start: 2021-01-14 | End: 2021-01-14 | Stop reason: HOSPADM

## 2021-01-14 RX ORDER — DIPHENHYDRAMINE HCL 25 MG
25 CAPSULE ORAL EVERY 6 HOURS PRN
Status: DISCONTINUED | OUTPATIENT
Start: 2021-01-14 | End: 2021-01-16 | Stop reason: HOSPADM

## 2021-01-14 RX ORDER — FUROSEMIDE 40 MG/1
40 TABLET ORAL DAILY
Status: DISCONTINUED | OUTPATIENT
Start: 2021-01-14 | End: 2021-01-16 | Stop reason: HOSPADM

## 2021-01-14 RX ORDER — SUCCINYLCHOLINE CHLORIDE 20 MG/ML
INJECTION INTRAMUSCULAR; INTRAVENOUS AS NEEDED
Status: DISCONTINUED | OUTPATIENT
Start: 2021-01-14 | End: 2021-01-14 | Stop reason: SURG

## 2021-01-14 RX ORDER — SODIUM CHLORIDE 0.9 % (FLUSH) 0.9 %
10 SYRINGE (ML) INJECTION EVERY 12 HOURS SCHEDULED
Status: DISCONTINUED | OUTPATIENT
Start: 2021-01-14 | End: 2021-01-14 | Stop reason: HOSPADM

## 2021-01-14 RX ORDER — GUAIFENESIN 600 MG/1
600 TABLET, EXTENDED RELEASE ORAL EVERY 12 HOURS SCHEDULED
Status: DISCONTINUED | OUTPATIENT
Start: 2021-01-14 | End: 2021-01-16 | Stop reason: HOSPADM

## 2021-01-14 RX ORDER — DEXAMETHASONE SODIUM PHOSPHATE 4 MG/ML
INJECTION, SOLUTION INTRA-ARTICULAR; INTRALESIONAL; INTRAMUSCULAR; INTRAVENOUS; SOFT TISSUE AS NEEDED
Status: DISCONTINUED | OUTPATIENT
Start: 2021-01-14 | End: 2021-01-14 | Stop reason: SURG

## 2021-01-14 RX ORDER — LIDOCAINE HYDROCHLORIDE 10 MG/ML
0.5 INJECTION, SOLUTION EPIDURAL; INFILTRATION; INTRACAUDAL; PERINEURAL ONCE AS NEEDED
Status: DISCONTINUED | OUTPATIENT
Start: 2021-01-14 | End: 2021-01-14 | Stop reason: HOSPADM

## 2021-01-14 RX ORDER — CLONIDINE HYDROCHLORIDE 0.2 MG/1
0.2 TABLET ORAL ONCE
Status: COMPLETED | OUTPATIENT
Start: 2021-01-14 | End: 2021-01-14

## 2021-01-14 RX ORDER — ONDANSETRON 2 MG/ML
4 INJECTION INTRAMUSCULAR; INTRAVENOUS EVERY 6 HOURS PRN
Status: DISCONTINUED | OUTPATIENT
Start: 2021-01-14 | End: 2021-01-16 | Stop reason: HOSPADM

## 2021-01-14 RX ORDER — DIAZEPAM 5 MG/1
5 TABLET ORAL EVERY 6 HOURS PRN
Status: DISCONTINUED | OUTPATIENT
Start: 2021-01-14 | End: 2021-01-16 | Stop reason: HOSPADM

## 2021-01-14 RX ORDER — ONDANSETRON 2 MG/ML
4 INJECTION INTRAMUSCULAR; INTRAVENOUS ONCE AS NEEDED
Status: DISCONTINUED | OUTPATIENT
Start: 2021-01-14 | End: 2021-01-14 | Stop reason: HOSPADM

## 2021-01-14 RX ORDER — ONDANSETRON 4 MG/1
4 TABLET, FILM COATED ORAL EVERY 6 HOURS PRN
Status: DISCONTINUED | OUTPATIENT
Start: 2021-01-14 | End: 2021-01-16 | Stop reason: HOSPADM

## 2021-01-14 RX ORDER — ONDANSETRON 2 MG/ML
INJECTION INTRAMUSCULAR; INTRAVENOUS AS NEEDED
Status: DISCONTINUED | OUTPATIENT
Start: 2021-01-14 | End: 2021-01-14 | Stop reason: SURG

## 2021-01-14 RX ORDER — CLINDAMYCIN PHOSPHATE 900 MG/50ML
900 INJECTION INTRAVENOUS ONCE
Status: COMPLETED | OUTPATIENT
Start: 2021-01-14 | End: 2021-01-14

## 2021-01-14 RX ORDER — FOLIC ACID 1 MG/1
1000 TABLET ORAL DAILY
Status: DISCONTINUED | OUTPATIENT
Start: 2021-01-14 | End: 2021-01-16 | Stop reason: HOSPADM

## 2021-01-14 RX ORDER — PREDNISONE 10 MG/1
10 TABLET ORAL DAILY
Status: DISCONTINUED | OUTPATIENT
Start: 2021-01-14 | End: 2021-01-16 | Stop reason: HOSPADM

## 2021-01-14 RX ORDER — PANTOPRAZOLE SODIUM 40 MG/1
40 TABLET, DELAYED RELEASE ORAL DAILY
Status: DISCONTINUED | OUTPATIENT
Start: 2021-01-14 | End: 2021-01-16 | Stop reason: HOSPADM

## 2021-01-14 RX ORDER — SODIUM CHLORIDE 0.9 % (FLUSH) 0.9 %
3-10 SYRINGE (ML) INJECTION AS NEEDED
Status: DISCONTINUED | OUTPATIENT
Start: 2021-01-14 | End: 2021-01-14 | Stop reason: HOSPADM

## 2021-01-14 RX ORDER — HYDROMORPHONE HCL 110MG/55ML
0.5 PATIENT CONTROLLED ANALGESIA SYRINGE INTRAVENOUS
Status: DISCONTINUED | OUTPATIENT
Start: 2021-01-14 | End: 2021-01-16 | Stop reason: HOSPADM

## 2021-01-14 RX ORDER — PROMETHAZINE HYDROCHLORIDE 12.5 MG/1
12.5 SUPPOSITORY RECTAL EVERY 4 HOURS PRN
Status: DISCONTINUED | OUTPATIENT
Start: 2021-01-14 | End: 2021-01-16 | Stop reason: HOSPADM

## 2021-01-14 RX ORDER — SODIUM CHLORIDE 0.9 % (FLUSH) 0.9 %
10 SYRINGE (ML) INJECTION AS NEEDED
Status: DISCONTINUED | OUTPATIENT
Start: 2021-01-14 | End: 2021-01-14 | Stop reason: HOSPADM

## 2021-01-14 RX ORDER — FLUMAZENIL 0.1 MG/ML
0.2 INJECTION INTRAVENOUS AS NEEDED
Status: DISCONTINUED | OUTPATIENT
Start: 2021-01-14 | End: 2021-01-14 | Stop reason: HOSPADM

## 2021-01-14 RX ORDER — CARVEDILOL 6.25 MG/1
12.5 TABLET ORAL 2 TIMES DAILY WITH MEALS
Status: DISCONTINUED | OUTPATIENT
Start: 2021-01-14 | End: 2021-01-16 | Stop reason: HOSPADM

## 2021-01-14 RX ORDER — ROCURONIUM BROMIDE 10 MG/ML
INJECTION, SOLUTION INTRAVENOUS AS NEEDED
Status: DISCONTINUED | OUTPATIENT
Start: 2021-01-14 | End: 2021-01-14 | Stop reason: SURG

## 2021-01-14 RX ORDER — BISACODYL 10 MG
10 SUPPOSITORY, RECTAL RECTAL DAILY PRN
Status: DISCONTINUED | OUTPATIENT
Start: 2021-01-14 | End: 2021-01-16 | Stop reason: HOSPADM

## 2021-01-14 RX ORDER — MAGNESIUM HYDROXIDE 1200 MG/15ML
LIQUID ORAL AS NEEDED
Status: DISCONTINUED | OUTPATIENT
Start: 2021-01-14 | End: 2021-01-14 | Stop reason: HOSPADM

## 2021-01-14 RX ORDER — SUFENTANIL CITRATE 50 UG/ML
INJECTION EPIDURAL; INTRAVENOUS AS NEEDED
Status: DISCONTINUED | OUTPATIENT
Start: 2021-01-14 | End: 2021-01-14 | Stop reason: SURG

## 2021-01-14 RX ORDER — BUPIVACAINE HCL/0.9 % NACL/PF 0.1 %
2 PLASTIC BAG, INJECTION (ML) EPIDURAL ONCE
Status: CANCELLED | OUTPATIENT
Start: 2021-01-14

## 2021-01-14 RX ORDER — NALOXONE HCL 0.4 MG/ML
0.4 VIAL (ML) INJECTION
Status: DISCONTINUED | OUTPATIENT
Start: 2021-01-14 | End: 2021-01-16 | Stop reason: HOSPADM

## 2021-01-14 RX ORDER — NITROGLYCERIN 0.4 MG/1
0.4 TABLET SUBLINGUAL
Status: DISCONTINUED | OUTPATIENT
Start: 2021-01-14 | End: 2021-01-16 | Stop reason: HOSPADM

## 2021-01-14 RX ORDER — SPIRONOLACTONE 25 MG/1
25 TABLET ORAL DAILY
Status: DISCONTINUED | OUTPATIENT
Start: 2021-01-14 | End: 2021-01-16 | Stop reason: HOSPADM

## 2021-01-14 RX ORDER — LIDOCAINE HYDROCHLORIDE 20 MG/ML
INJECTION, SOLUTION EPIDURAL; INFILTRATION; INTRACAUDAL; PERINEURAL AS NEEDED
Status: DISCONTINUED | OUTPATIENT
Start: 2021-01-14 | End: 2021-01-14 | Stop reason: SURG

## 2021-01-14 RX ORDER — PROPOFOL 10 MG/ML
VIAL (ML) INTRAVENOUS AS NEEDED
Status: DISCONTINUED | OUTPATIENT
Start: 2021-01-14 | End: 2021-01-14 | Stop reason: SURG

## 2021-01-14 RX ORDER — OXYCODONE AND ACETAMINOPHEN 7.5; 325 MG/1; MG/1
2 TABLET ORAL EVERY 4 HOURS PRN
Status: DISCONTINUED | OUTPATIENT
Start: 2021-01-14 | End: 2021-01-14 | Stop reason: HOSPADM

## 2021-01-14 RX ORDER — HYDROCODONE BITARTRATE AND ACETAMINOPHEN 10; 325 MG/1; MG/1
1 TABLET ORAL EVERY 4 HOURS PRN
Status: DISCONTINUED | OUTPATIENT
Start: 2021-01-14 | End: 2021-01-16 | Stop reason: HOSPADM

## 2021-01-14 RX ORDER — HYDROCODONE BITARTRATE AND ACETAMINOPHEN 5; 325 MG/1; MG/1
1 TABLET ORAL EVERY 4 HOURS PRN
Status: DISCONTINUED | OUTPATIENT
Start: 2021-01-14 | End: 2021-01-16 | Stop reason: HOSPADM

## 2021-01-14 RX ORDER — FENTANYL CITRATE 50 UG/ML
25 INJECTION, SOLUTION INTRAMUSCULAR; INTRAVENOUS
Status: DISCONTINUED | OUTPATIENT
Start: 2021-01-14 | End: 2021-01-14 | Stop reason: HOSPADM

## 2021-01-14 RX ORDER — LABETALOL HYDROCHLORIDE 5 MG/ML
5 INJECTION, SOLUTION INTRAVENOUS
Status: DISCONTINUED | OUTPATIENT
Start: 2021-01-14 | End: 2021-01-14 | Stop reason: HOSPADM

## 2021-01-14 RX ADMIN — BUDESONIDE 0.5 MG: 0.5 INHALANT RESPIRATORY (INHALATION) at 19:47

## 2021-01-14 RX ADMIN — OXYCODONE HYDROCHLORIDE AND ACETAMINOPHEN 2 TABLET: 7.5; 325 TABLET ORAL at 12:58

## 2021-01-14 RX ADMIN — LEFLUNOMIDE 20 MG: 20 TABLET, FILM COATED ORAL at 15:50

## 2021-01-14 RX ADMIN — GUAIFENESIN 600 MG: 600 TABLET, EXTENDED RELEASE ORAL at 21:40

## 2021-01-14 RX ADMIN — GUAIFENESIN 600 MG: 600 TABLET, EXTENDED RELEASE ORAL at 14:18

## 2021-01-14 RX ADMIN — DEXAMETHASONE SODIUM PHOSPHATE 4 MG: 4 INJECTION, SOLUTION INTRAMUSCULAR; INTRAVENOUS at 11:39

## 2021-01-14 RX ADMIN — ONDANSETRON HYDROCHLORIDE 4 MG: 2 SOLUTION INTRAMUSCULAR; INTRAVENOUS at 11:39

## 2021-01-14 RX ADMIN — FENTANYL CITRATE 25 MCG: 50 INJECTION, SOLUTION INTRAMUSCULAR; INTRAVENOUS at 12:27

## 2021-01-14 RX ADMIN — LIDOCAINE HYDROCHLORIDE 100 MG: 20 INJECTION, SOLUTION EPIDURAL; INFILTRATION; INTRACAUDAL; PERINEURAL at 09:20

## 2021-01-14 RX ADMIN — HYDROCODONE BITARTRATE AND ACETAMINOPHEN 1 TABLET: 10; 325 TABLET ORAL at 17:35

## 2021-01-14 RX ADMIN — FOLIC ACID 1000 MCG: 1 TABLET ORAL at 14:18

## 2021-01-14 RX ADMIN — PROPOFOL 150 MG: 10 INJECTION, EMULSION INTRAVENOUS at 09:20

## 2021-01-14 RX ADMIN — SODIUM CHLORIDE 100 ML/HR: 9 INJECTION, SOLUTION INTRAVENOUS at 14:18

## 2021-01-14 RX ADMIN — ASPIRIN 81 MG: 81 TABLET ORAL at 14:18

## 2021-01-14 RX ADMIN — SUCCINYLCHOLINE CHLORIDE 160 MG: 20 INJECTION, SOLUTION INTRAMUSCULAR; INTRAVENOUS at 09:20

## 2021-01-14 RX ADMIN — CEFAZOLIN SODIUM 3 G: 10 INJECTION, POWDER, FOR SOLUTION INTRAVENOUS at 15:51

## 2021-01-14 RX ADMIN — FENTANYL CITRATE 25 MCG: 50 INJECTION, SOLUTION INTRAMUSCULAR; INTRAVENOUS at 12:32

## 2021-01-14 RX ADMIN — CLOPIDOGREL 75 MG: 75 TABLET, FILM COATED ORAL at 14:18

## 2021-01-14 RX ADMIN — ROCURONIUM BROMIDE 10 MG: 10 INJECTION INTRAVENOUS at 09:20

## 2021-01-14 RX ADMIN — CLINDAMYCIN IN 5 PERCENT DEXTROSE 900 MG: 18 INJECTION, SOLUTION INTRAVENOUS at 09:30

## 2021-01-14 RX ADMIN — PANTOPRAZOLE SODIUM 40 MG: 40 TABLET, DELAYED RELEASE ORAL at 14:19

## 2021-01-14 RX ADMIN — ISOSORBIDE MONONITRATE 60 MG: 60 TABLET, EXTENDED RELEASE ORAL at 14:18

## 2021-01-14 RX ADMIN — SODIUM CHLORIDE, POTASSIUM CHLORIDE, SODIUM LACTATE AND CALCIUM CHLORIDE 100 ML/HR: 600; 310; 30; 20 INJECTION, SOLUTION INTRAVENOUS at 07:38

## 2021-01-14 RX ADMIN — CLONIDINE HYDROCHLORIDE 0.2 MG: 0.2 TABLET ORAL at 14:18

## 2021-01-14 RX ADMIN — ACETAMINOPHEN 1000 MG: 500 TABLET, FILM COATED ORAL at 08:27

## 2021-01-14 RX ADMIN — CARVEDILOL 12.5 MG: 6.25 TABLET, FILM COATED ORAL at 17:35

## 2021-01-14 RX ADMIN — ISOSORBIDE MONONITRATE 60 MG: 60 TABLET, EXTENDED RELEASE ORAL at 21:40

## 2021-01-14 RX ADMIN — PREDNISONE 10 MG: 10 TABLET ORAL at 14:18

## 2021-01-14 RX ADMIN — SUFENTANIL CITRATE 25 MCG: 50 INJECTION EPIDURAL; INTRAVENOUS at 09:20

## 2021-01-14 RX ADMIN — HYDROCODONE BITARTRATE AND ACETAMINOPHEN 1 TABLET: 5; 325 TABLET ORAL at 21:49

## 2021-01-14 RX ADMIN — SODIUM CHLORIDE, POTASSIUM CHLORIDE, SODIUM LACTATE AND CALCIUM CHLORIDE: 600; 310; 30; 20 INJECTION, SOLUTION INTRAVENOUS at 09:50

## 2021-01-14 NOTE — ANESTHESIA PREPROCEDURE EVALUATION
Anesthesia Evaluation     no history of anesthetic complications:  NPO Solid Status: > 8 hours  NPO Liquid Status: > 8 hours           Airway   Mallampati: III  TM distance: <3 FB  Neck ROM: full  No difficulty expected and Possible difficult intubation  Dental - normal exam     Pulmonary    (+) asthma,  Cardiovascular   Exercise tolerance: poor (<4 METS)    (+) pacemaker pacemaker interrogated <1 month ago, hypertension, past MI , CAD, cardiac stents (two stents, most recent 2013) more than 12 months ago     ROS comment: medtronic pacemaker, 76% sensing, 23 % a paced    Neuro/Psych  (+) syncope,     GI/Hepatic/Renal/Endo    (+) obesity,   thyroid problem hypothyroidism    Musculoskeletal     Abdominal    Substance History      OB/GYN          Other   arthritis (Prednisone, 2 mg daily for rheumatoid arthritis), chronic steroid use                    Anesthesia Plan    ASA 3     general   (2mg prednisone daily, no need for stress dose at this home dose , monitor  Have magnet in room, interrogate device in recovery.)  intravenous induction     Anesthetic plan, all risks, benefits, and alternatives have been provided, discussed and informed consent has been obtained with: patient.

## 2021-01-14 NOTE — OP NOTE
Date:  1/14/21    OPERATIVE NOTE    PREOPERATIVE DIAGNOSIS:   1. 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 Repair right hip chronic massive gluteus medius and minimus tear     SURGEON:  Nino Carlson M.D.    ASSISTANT:  Silverio Parekh PA-C    ANESTHESIA:  General    ESTIMATED BLOOD LOSS:  Minimal    COMPLICATIONS:  None.    CONDITION:  Stable.    IMPLANTS:      INDICATION FOR PROCEDURE:  HISTORY OF PRESENT ILLNESS: This is a 65-year-old patient who presented to the outpatient clinic with complaints of long-standing left hip pain. The patient could not think of particular trauma. She did have tenderness to palpation over the trochanter. She had previous corticosteroid injections, oral anti-inflammatories and therapy, and despite these measures continued to have pain. An MRI demonstrated a massive chronic retracted tear of the gluteus medius and minimus tendons with significant heterotopic ossification around the trochanter.  Based on this, the decision was made to take the patient to the operating room for the above-mentioned procedure.      DESCRIPTION OF PROCEDURE:  The patient was interviewed in the preanesthesia area where the operative extremity was marked with a marking pen.  The patient was then taken to the operative suite were general endotracheal anesthesia was performed per the anesthesia team.  A timeout was called to confirm the patient, the operative site, the planned procedure, and the administration of antibiotics.  The patient was then prepped and draped in a standard sterile fashion using ChloraPrep.         DESCRIPTION OF PROCEDURE: The patient was interviewed in the preanesthesia area where the operative hip was marked with a marking pen. She was then taken to the operative suite where general endotracheal anesthesia was performed by the anesthesia team. A timeout was then called  to confirm the patient, the operative site, as well as the planned procedure and administration of antibiotics. The patient was prepped and draped in a standard sterile fashion using ChloraPrep. She was positioned in the lateral decubitus position on a pegboard.     An incision was then made and centered over the greater trochanter. Careful dissection was carried down to the iliotibial band. The iliotibial band was then divided in line with its fibers.upon dividing the IT band there was a large collection of serous fluid was evacuated.  The patient demonstrated an obvious chronic massive tear of the gluteus medius and minimus tendons with some retraction.  There is also the classic stranding of the tissue and the greater trochanter had the appearance of a chronic tear with bony overgrowth.  There were multiple loose areas of heterotopic ossification within the tendon and distal to the tendon which were removed.  The bone itself was then excoriated back to bleeding bone as the trochanter was recontoured.    I then carefully began freeing up the abductors.  Kocher retractors were used to grasp the tissue while an elevator was used to help elevate the tissue proximally.  There was a large cuff of viable tissue that could be reapproximated without significant tension.    At this point time 5 [employer] 2.9 mm juggernaut anchors were then evenly spaced just distal to the tip of the trochanter.  Horizontal mattress sutures were then placed just distal to the musculotendinous junction beginning posteriorly and ending anteriorly.  The sutures were then tied from central to anterior and then posterior with the leg in a slightly abducted position.  I then crisscrossed a suture from each anchor one medial one lateral and placed these over a [employer] bio inductive implant which had been sewn in proximally at the musculotendinous junction with a 2-0 Vicryl suture.  The sutures were placed in a suture bridge type  construct over the graft and the native tissue and placed into 2 Ag[employer] 4.5 mm lateral row anchors at about the level of the vastus ridge.  I felt I was able to restore the footprint of the abductors.      The patient's wound was then irrigated with bulb syringe lavage. The deep tissue was approximated with #1 Vicryl suture and skin was approximated with 3-0 Vicryl and closed with a glue and mesh. The patient awoke from anesthesia without difficulty and was transferred to the PACU in stable condition. All sponge, needle and instrument counts were correct at the end of the procedure.     cc:         Nino Carlson M.D.

## 2021-01-14 NOTE — ANESTHESIA POSTPROCEDURE EVALUATION
Patient: Tawny Shin    Procedure Summary     Date: 01/14/21 Room / Location:  [facility] OR  /  [facility] OR    Anesthesia Start: 0918 Anesthesia Stop: 1155    Procedure: RIGHT HIP GLUTEUS MEDLUS / MINIMUS REPAIR, POSSIBLE ACHILLES ALLOGRAFT (Right Hip) Diagnosis: (S76.011A)    Surgeon: Nino Carlson MD Provider: Fabio Love CRNA    Anesthesia Type: general ASA Status: 3          Anesthesia Type: general    Vitals  Vitals Value Taken Time   /68 01/14/21 1254   Temp 97.7 °F (36.5 °C) 01/14/21 1250   Pulse 66 01/14/21 1257   Resp 16 01/14/21 1250   SpO2 100 % 01/14/21 1257   Vitals shown include unvalidated device data.        Post Anesthesia Care and Evaluation    PONV Status: none  Comments: Patient d/c from PACU prior to anes eval based on Abdifatah score.  Please see RN notes for details of d/c criteria.    Blood pressure 137/62, pulse 75, temperature 97.4 °F (36.3 °C), temperature source Oral, resp. rate 18, SpO2 95 %, not currently breastfeeding.

## 2021-01-14 NOTE — PLAN OF CARE
Goal Outcome Evaluation:  Plan of Care Reviewed With: patient  Progress: improving  Outcome Summary: Pt A&Ox4, VSS safety maintained. C/o pain with relief from po prn pain medication. IV fluids infusing, voiding poer bedpan, Dressing CDI. Will continue to monitor

## 2021-01-14 NOTE — ANESTHESIA PROCEDURE NOTES
Airway  Urgency: elective    Date/Time: 1/14/2021 9:22 AM  Difficult airway    General Information and Staff    Patient location during procedure: OR  CRNA: Fabio Love CRNA    Indications and Patient Condition  Indications for airway management: airway protection    Preoxygenated: yes  MILS maintained throughout  Mask difficulty assessment: 1 - vent by mask    Final Airway Details  Final airway type: endotracheal airway      Successful airway: ETT  Cuffed: yes   Successful intubation technique: direct laryngoscopy  Endotracheal tube insertion site: oral  Blade: Flores  Blade size: 2  ETT size (mm): 7.5  Cormack-Lehane Classification: grade III - view of epiglottis only  Placement verified by: chest auscultation   Cuff volume (mL): 10  Measured from: lips  ETT/EBT  to lips (cm): 22  Number of attempts at approach: 1  Assessment: lips, teeth, and gum same as pre-op and atraumatic intubation    Additional Comments  Mallanpotti 3 , only 2 fingers under chin , only base of cords barely seen with flores 2, succes with 1 attempt.

## 2021-01-14 NOTE — PROGRESS NOTES
Discharge Planning Assessment  [facility]     Patient Name: Tawny Shin  MRN: [id number]  Today's Date: 1/14/2021    Admit Date: 1/14/2021    Discharge Needs Assessment     Row Name 01/14/21 1549       Living Environment    Lives With  alone    Current Living Arrangements  home/apartment/condo    Primary Care Provided by  self    Provides Primary Care For  no one    Family Caregiver if Needed  child(mary jo), adult;parent(s)    Quality of Family Relationships  helpful;involved    Able to Return to Prior Arrangements  yes    Living Arrangement Comments  Pt states she plans to dc home when ready       Resource/Environmental Concerns    Resource/Environmental Concerns  none    Transportation Concerns  car, none       Transition Planning    Patient/Family Anticipates Transition to  home;home with help/services    Patient/Family Anticipated Services at Transition  durable medical equipment;home health care    Transportation Anticipated  family or friend will provide       Discharge Needs Assessment    Readmission Within the Last 30 Days  no previous admission in last 30 days    Equipment Currently Used at Home  walker, rolling;bath bench    Concerns to be Addressed  denies needs/concerns at this time    Anticipated Changes Related to Illness  none    Equipment Needed After Discharge  walker, standard    Discharge Facility/Level of Care Needs  home with home health    Discharge Coordination/Progress  Spoke to pt about dc planning needs. Pt states she plans to dc home when ready. Pt is requesting a standard walker and prefers it be delivered to room before dc. Pt prefers [facility] for walker. Discussed home health but pt is unsure if she will need HH at dc. Pt requested  follow up  tomorrow regarding HH.        Discharge Plan    No documentation.       Continued Care and Services - Admitted Since 1/14/2021    Coordination has not been started for this encounter.         Demographic Summary    No documentation.        Functional Status    No documentation.       Psychosocial    No documentation.       Abuse/Neglect    No documentation.       Legal    No documentation.       Substance Abuse    No documentation.       Patient Forms    No documentation.           Gaby Lora MSW

## 2021-01-14 NOTE — TELEPHONE ENCOUNTER
Spoke to patient's daughter (patient in surgery) and let her know we have not yet received her patient assistance forms in the mail. She will let her mother know. Beulah Robertson MA

## 2021-01-15 PROCEDURE — A9270 NON-COVERED ITEM OR SERVICE: HCPCS | Performed by: ORTHOPAEDIC SURGERY

## 2021-01-15 PROCEDURE — 63710000001 FOLIC ACID 1 MG TABLET: Performed by: ORTHOPAEDIC SURGERY

## 2021-01-15 PROCEDURE — 94799 UNLISTED PULMONARY SVC/PX: CPT

## 2021-01-15 PROCEDURE — 63710000001 CLOPIDOGREL 75 MG TABLET: Performed by: ORTHOPAEDIC SURGERY

## 2021-01-15 PROCEDURE — 63710000001 HYDROCODONE-ACETAMINOPHEN 5-325 MG TABLET: Performed by: ORTHOPAEDIC SURGERY

## 2021-01-15 PROCEDURE — 63710000001 PREDNISONE PER 5 MG: Performed by: ORTHOPAEDIC SURGERY

## 2021-01-15 PROCEDURE — 97530 THERAPEUTIC ACTIVITIES: CPT

## 2021-01-15 PROCEDURE — 63710000001 LEFLUNOMIDE 20 MG TABLET: Performed by: ORTHOPAEDIC SURGERY

## 2021-01-15 PROCEDURE — 63710000001 HYDROCODONE-ACETAMINOPHEN 10-325 MG TABLET: Performed by: ORTHOPAEDIC SURGERY

## 2021-01-15 PROCEDURE — 63710000001 CARVEDILOL 6.25 MG TABLET: Performed by: ORTHOPAEDIC SURGERY

## 2021-01-15 PROCEDURE — 63710000001 ISOSORBIDE MONONITRATE 60 MG TABLET SUSTAINED-RELEASE 24 HOUR: Performed by: ORTHOPAEDIC SURGERY

## 2021-01-15 PROCEDURE — 63710000001 PANTOPRAZOLE 40 MG TABLET DELAYED-RELEASE: Performed by: ORTHOPAEDIC SURGERY

## 2021-01-15 PROCEDURE — 97165 OT EVAL LOW COMPLEX 30 MIN: CPT

## 2021-01-15 PROCEDURE — 63710000001 ASPIRIN 81 MG TABLET DELAYED-RELEASE: Performed by: ORTHOPAEDIC SURGERY

## 2021-01-15 PROCEDURE — 25010000002 CEFAZOLIN PER 500 MG: Performed by: ORTHOPAEDIC SURGERY

## 2021-01-15 PROCEDURE — 63710000001 GUAIFENESIN 600 MG TABLET SUSTAINED-RELEASE 12 HOUR: Performed by: ORTHOPAEDIC SURGERY

## 2021-01-15 PROCEDURE — 97116 GAIT TRAINING THERAPY: CPT

## 2021-01-15 PROCEDURE — 97161 PT EVAL LOW COMPLEX 20 MIN: CPT | Performed by: PHYSICAL THERAPIST

## 2021-01-15 RX ADMIN — PREDNISONE 10 MG: 10 TABLET ORAL at 08:33

## 2021-01-15 RX ADMIN — HYDROCODONE BITARTRATE AND ACETAMINOPHEN 1 TABLET: 10; 325 TABLET ORAL at 16:37

## 2021-01-15 RX ADMIN — HYDROCODONE BITARTRATE AND ACETAMINOPHEN 1 TABLET: 10; 325 TABLET ORAL at 08:32

## 2021-01-15 RX ADMIN — CARVEDILOL 12.5 MG: 6.25 TABLET, FILM COATED ORAL at 08:32

## 2021-01-15 RX ADMIN — SODIUM CHLORIDE 100 ML/HR: 9 INJECTION, SOLUTION INTRAVENOUS at 00:23

## 2021-01-15 RX ADMIN — LEFLUNOMIDE 20 MG: 20 TABLET, FILM COATED ORAL at 08:32

## 2021-01-15 RX ADMIN — GUAIFENESIN 600 MG: 600 TABLET, EXTENDED RELEASE ORAL at 08:32

## 2021-01-15 RX ADMIN — HYDROCODONE BITARTRATE AND ACETAMINOPHEN 1 TABLET: 5; 325 TABLET ORAL at 21:00

## 2021-01-15 RX ADMIN — GUAIFENESIN 600 MG: 600 TABLET, EXTENDED RELEASE ORAL at 20:55

## 2021-01-15 RX ADMIN — FOLIC ACID 1000 MCG: 1 TABLET ORAL at 08:32

## 2021-01-15 RX ADMIN — BUDESONIDE 0.5 MG: 0.5 INHALANT RESPIRATORY (INHALATION) at 07:37

## 2021-01-15 RX ADMIN — PANTOPRAZOLE SODIUM 40 MG: 40 TABLET, DELAYED RELEASE ORAL at 08:32

## 2021-01-15 RX ADMIN — CARVEDILOL 12.5 MG: 6.25 TABLET, FILM COATED ORAL at 18:08

## 2021-01-15 RX ADMIN — HYDROCODONE BITARTRATE AND ACETAMINOPHEN 1 TABLET: 10; 325 TABLET ORAL at 03:10

## 2021-01-15 RX ADMIN — BUDESONIDE 0.5 MG: 0.5 INHALANT RESPIRATORY (INHALATION) at 19:31

## 2021-01-15 RX ADMIN — CEFAZOLIN SODIUM 3 G: 10 INJECTION, POWDER, FOR SOLUTION INTRAVENOUS at 00:23

## 2021-01-15 RX ADMIN — ASPIRIN 81 MG: 81 TABLET ORAL at 08:33

## 2021-01-15 RX ADMIN — CEFAZOLIN SODIUM 3 G: 10 INJECTION, POWDER, FOR SOLUTION INTRAVENOUS at 08:32

## 2021-01-15 RX ADMIN — CLOPIDOGREL 75 MG: 75 TABLET, FILM COATED ORAL at 08:32

## 2021-01-15 RX ADMIN — HYDROCODONE BITARTRATE AND ACETAMINOPHEN 1 TABLET: 10; 325 TABLET ORAL at 12:21

## 2021-01-15 RX ADMIN — ISOSORBIDE MONONITRATE 60 MG: 60 TABLET, EXTENDED RELEASE ORAL at 20:55

## 2021-01-15 RX ADMIN — ISOSORBIDE MONONITRATE 60 MG: 60 TABLET, EXTENDED RELEASE ORAL at 08:32

## 2021-01-15 NOTE — PROGRESS NOTES
Continued Stay Note   [facility]     Patient Name: Tawny Shin  MRN: [id number]  Today's Date: 1/15/2021    Admit Date: 1/14/2021    Discharge Plan     Row Name 01/15/21 0910       Plan    Plan  Home with possible HH    Patient/Family in Agreement with Plan  yes    Plan Comments  Pt plans to dc home when ready. Pt is requesting a standard walker and prefers it be delivered to room before dc. Pt prefers [facility] for walker.        Discharge Codes    No documentation.             KALYN Kimbrough

## 2021-01-15 NOTE — ADDENDUM NOTE
Addendum  created 01/14/21 1805 by Fabio Love CRNA    Visit Navigator Flowsheet section accepted

## 2021-01-15 NOTE — PLAN OF CARE
Goal Outcome Evaluation:  Plan of Care Reviewed With: patient  Progress: improving  Pt A&Ox4, VSS safety maintained. Upx1 with RW to restroom. C/o pain with relief from po prn pain medication. Plan to d/c home tomorrow. Will continue to monitor

## 2021-01-15 NOTE — PROGRESS NOTES
Continued Stay Note  [facility]     Patient Name: Tawny Shin  MRN: [id number]  Today's Date: 1/15/2021    Admit Date: 1/14/2021    Discharge Plan     Row Name 01/15/21 1024       Plan    Plan  Home with HH    Provided Post Acute Provider List?  Yes    Post Acute Provider List  Home Health    Provided Post Acute Provider Quality & Resource List?  Yes    Post Acute Provider Quality and Resource List  Home Health    Delivered To  Patient    Method of Delivery  Telephone    Patient/Family in Agreement with Plan  yes    Plan Comments  HH and DME orders received.  SW spoke with patient and she had no preference as to DME provider.  Note from yesterday indicated [facility] so referral was called and faxed to [facility].  Patient stated she preferred [facility] for HH services.  MEGA informed Joy with [facility] of referral.    Final Discharge Disposition Code  06 - home with home health care    Final Note  Home with [facility]    Row Name 01/15/21 0910       Plan    Plan  Home with possible HH    Patient/Family in Agreement with Plan  yes    Plan Comments  Pt plans to dc home when ready. Pt is requesting a standard walker and prefers it be delivered to room before dc[facility]        Discharge Codes    No documentation.             KALYN Kimbrough

## 2021-01-15 NOTE — THERAPY EVALUATION
Patient Name: Tawny Shin  : 1955    MRN: [id number]                              Today's Date: 1/15/2021       Admit Date: 2021    Visit Dx:     ICD-10-CM ICD-9-CM   1. Muscle tear  T14.8XXA 848.9   2. Impaired mobility  Z74.09 799.89   3. Essential hypertension  I10 401.9   4. Syncope, cardiogenic  R55 780.2   5. Decreased activities of daily living (ADL)  Z78.9 V49.89     Patient Active Problem List   Diagnosis   • Eustachian tube dysfunction   • Sensorineural hearing loss   • Lumbago of multiple sites in spine with sciatica   • Spondylolisthesis of lumbar region   • Coronary artery disease   • Hyperlipidemia   • Hypertension   • Myalgia due to statin   • S/P coronary artery stent placement   • Pacemaker   • Seropositive rheumatoid arthritis of multiple sites (CMS/HCC)   • Sick sinus syndrome (CMS/HCC)   • Other osteoporosis without current pathological fracture   • Allergic-infective asthma   • Anemia of diabetes   • Arthritis of both knees   • Vasovagal syncope   • Bilateral bunions   • Bronchitis   • Cardiac pacemaker syndrome   • Charcot's joint of foot, left   • Constipation   • 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   • Intrinsic asthma   • Left carotid bruit   • Neutropenia (CMS/HCC)   • Obesity   • Primary osteoarthritis of left knee   • Psoriasis vulgaris   • Recurrent acute serous otitis media of both ears   • S/P lumbar laminectomy   • Thrombocytopenia (CMS/HCC)   • Hypothyroidism   • Vitamin D deficiency   • Myxedema heart disease   • Spondylolisthesis of lumbar region   • Syncope, cardiogenic   • Rupture of gluteus minimus tendon   • Muscle tear     Past Medical History:   Diagnosis Date   • Anemia of diabetes 2020   • Arthritis    • Asthma    • Chronic sinusitis    • Coronary artery disease    • Eustachian tube dysfunction    • Heart disease    • Herpes simplex    • Hyperlipidemia    • Hypertension    • Knee dislocation    • Labral tear of right hip joint    • Laryngitis sicca    •  Laryngitis, chronic    • MI (myocardial infarction) (CMS/HCC)    • Otorrhea    • Sensorineural hearing loss    • Sjogren's disease (CMS/HCC)      Past Surgical History:   Procedure Laterality Date   • A-V CARDIAC PACEMAKER INSERTION  2016   • ATRIAL CARDIAC PACEMAKER INSERTION     • CARDIAC CATHETERIZATION     • CATARACT EXTRACTION     • COLONOSCOPY  11/08/2011    One fold in the ascending colon which showed ulcer otherwise normal exam   • COLONOSCOPY  11/12/2004    Normal exam repeat in five years   • CORONARY ANGIOPLASTY WITH STENT PLACEMENT      X 2; 2013 & 2014   • ENDOSCOPY  07/10/2014    Normal exam   • HIP ABDUCTION TENOTOMY BILATERAL Right 1/14/2021    Procedure: RIGHT HIP GLUTEUS MEDLUS / MINIMUS REPAIR, POSSIBLE ACHILLES ALLOGRAFT;  Surgeon: Nino Carlson MD;  Location:  [facility];  Service: Orthopedics;  Laterality: Right;   • JOINT REPLACEMENT     • LUMBAR FUSION N/A 1/19/2018    Procedure: L3-4,L4-5 DECOMPRESSION, POSTERIOR SPINAL FUSION WITH INSTRUMENTATION;  Surgeon: Fortino Oropeza MD;  Location:  [facility];  Service:    • LUMBAR LAMINECTOMY WITH FUSION Left 1/17/2018    Procedure: LEFT L3-4 L4-5 LATERAL LUMBAR INTERBODY FUSION;  Surgeon: Fortino Oropeza MD;  Location:  [facility];  Service:    • MYRINGOTOMY W/ TUBES  09/04/2014    TUBES NO LONGER IN PLACE   • OTHER SURGICAL HISTORY      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 so hardware was removed and spacers were placed   • REPLACEMENT TOTAL KNEE Right      General Information     Row Name 01/15/21 0750          OT Time and Intention    Document Type  evaluation  -MW     Mode of Treatment  occupational therapy  -     Row Name 01/15/21 0750          General Information    Patient Profile Reviewed  yes  -MW     Prior Level of Function  independent:;all household mobility;community mobility;ADL's  -MW     Existing Precautions/Restrictions  fall;hip no R hip flexion past 90*; no active R hip abduction; no R hip adduction past neutral; exit bed on left  side  -MW     Barriers to Rehab  physical barrier  -MW     Row Name 01/15/21 0750          Occupational Profile    Reason for Services/Referral (Occupational Profile)  post op R gluteus medius and minimus repair 1/14/21  -MW     Environmental Supports and Barriers (Occupational Profile)  daughter is available for help as needed; equipment available: walk in shower, grab bars, rollator; elevated toilet  -MW     Row Name 01/15/21 0750          Living Environment    Lives With  alone plans to d/c home w daughter for a couple days  -MW     Row Name 01/15/21 0750          Home Main Entrance    Number of Stairs, Main Entrance  none  -MW     Stair Railings, Main Entrance  none  -MW     Row Name 01/15/21 0750          Stairs Within Home, Primary    Number of Stairs, Within Home, Primary  none  -MW     Row Name 01/15/21 0750          Cognition    Orientation Status (Cognition)  oriented x 4  -MW     Row Name 01/15/21 0750          Safety Issues, Functional Mobility    Impairments Affecting Function (Mobility)  endurance/activity tolerance;strength;range of motion (ROM);pain  -MW       User Key  (r) = Recorded By, (t) = Taken By, (c) = Cosigned By    Initials Name Provider Type    MW Adeola Payne, OTR/L Occupational Therapist          Mobility/ADL's     Row Name 01/15/21 0750          Bed Mobility    Bed Mobility  rolling left;supine-sit  -     All Activities, [place] (Bed Mobility)  supervision;1 person assist;verbal cues  -MW     Rolling Left [place] (Bed Mobility)  verbal cues;1 person assist;supervision  -     Supine-Sit [place] (Bed Mobility)  supervision;1 person assist;verbal cues  -     Assistive Device (Bed Mobility)  bed rails;head of bed elevated  -     Row Name 01/15/21 0750          Transfers    Transfers  sit-stand transfer;toilet transfer  -     Sit-Stand [place] (Transfers)  contact guard;1 person assist;verbal cues  -     [place] Level (Toilet Transfer)  contact  guard;supervision CGA to sit, S to stand from commode  -     Assistive Device (Toilet Transfer)  grab bars/safety frame;walker, front-wheeled  -     Row Name 01/15/21 [facility]          Sit-Stand Transfer    Assistive Device (Sit-Stand Transfers)  walker, front-wheeled  -[facility] Name 01/15/21 [facility]          Toilet Transfer    Type (Toilet Transfer)  sit-stand;stand-sit;stand pivot/stand step  -MW     Row Name 01/15/21 [facility]          Functional Mobility    Functional Mobility- Ind. Level  contact guard assist;verbal cues required  -     Functional Mobility- Device  rolling walker  -     Functional Mobility- Comment  vc for step sequence and maintaining precautions with side stepping, amb within room to BR and back to chair  -[facility] Name 01/15/21 [facility]          Activities of Daily Living    BADL Assessment/Intervention  lower body dressing;toileting  -MW     Row Name 01/15/21 [facility]          Lower Body Dressing Assessment/Training    [place] Level (Lower Body Dressing)  don;pants/bottoms;minimum assist (75% patient effort);verbal cues  -     Position (Lower Body Dressing)  edge of bed sitting;supported standing  -[facility] Name 01/15/21 [facility]          Toileting Assessment/Training    [place] Level (Toileting)  supervision  -     Assistive Devices (Toileting)  commode;grab bar/safety frame RW  -     Position (Toileting)  supported sitting;supported standing  -       User Key  (r) = Recorded By, (t) = Taken By, (c) = Cosigned By    Initials Name Provider Type     Adeola Payne, OTR/L Occupational Therapist        Obj/Interventions     [facility] Name 01/15/21 [facility]          Sensory Assessment (Somatosensory)    Sensory Assessment (Somatosensory)  UE sensation intact  -[facility] Name 01/15/21 [facility]          Range of Motion Comprehensive    Comment, General Range of Motion  AROM WFL BUE, edema throughout BUE 2' RA  -[facility] Name 01/15/21 [facility]          Strength Comprehensive (MMT)    Comment, General  Manual Muscle Testing (MMT) Assessment  BUE functionally 4/5  -MW     [facility] Name 01/15/21 0750          Balance    Balance Assessment  sitting static balance;sitting dynamic balance;standing static balance  -MW     Static Sitting Balance  WFL;sitting, edge of bed  -MW     Dynamic Sitting Balance  WFL;sitting, edge of bed  -MW     Static Standing Balance  WFL;supported CGA  -MW       User Key  (r) = Recorded By, (t) = Taken By, (c) = Cosigned By    Initials Name Provider Type     Adeola Payne, OTR/L Occupational Therapist        Goals/Plan     Row Name 01/15/21 0750          Transfer Goal 1 (OT)    Activity/Assistive Device (Transfer Goal 1, OT)  sit-to-stand/stand-to-sit;bed-to-chair/chair-to-bed;toilet;shower chair;walk-in shower;walker, rolling grab bars  -     [place] Level/Cues Needed (Transfer Goal 1, OT)  modified independence  -MW     Time Frame (Transfer Goal 1, OT)  long term goal (LTG);10 days  -MW     Progress/Outcome (Transfer Goal 1, OT)  goal ongoing  -MW     Row Name 01/15/21 0750          Bathing Goal 1 (OT)    Activity/Device (Bathing Goal 1, OT)  bathing skills, all;grab bar, tub/shower;shower chair  -     [place]w Level/Cues Needed (Bathing Goal 1, OT)  modified independence AE PRN  -MW     Time Frame (Bathing Goal 1, OT)  long term goal (LTG);10 days  -MW     Progress/Outcomes (Bathing Goal 1, OT)  goal ongoing  -MW     Row Name 01/15/21 0750          Dressing Goal 1 (OT)    Activity/Device (Dressing Goal 1, OT)  lower body dressing AE PRN  -MW     Bartholomew/Cues Needed (Dressing Goal 1, OT)  modified independence  -MW     Time Frame (Dressing Goal 1, OT)  long term goal (LTG);10 days  -MW     Progress/Outcome (Dressing Goal 1, OT)  goal ongoing  -MW     Row Name 01/15/21 0750          Therapy Assessment/Plan (OT)    Planned Therapy Interventions (OT)  activity tolerance training;BADL retraining;functional balance retraining;occupation/activity based interventions;adaptive  equipment training;patient/caregiver education/training;transfer/mobility retraining  -       User Key  (r) = Recorded By, (t) = Taken By, (c) = Cosigned By    Initials Name Provider Type    Adeola Sanchez, OTR/L Occupational Therapist        Clinical Impression     Row Name 01/15/21 0750          Pain Assessment    Additional Documentation  Pain Scale: Numbers Pre/Post-Treatment (Group)  -[facility] Name 01/15/21 0750          Pain Scale: Numbers Pre/Post-Treatment    Pretreatment Pain Rating  2/10  -MW     Posttreatment Pain Rating  4/10  -MW     Pain Location - Side  Right  -MW     Pain Location - Orientation  lateral  -MW     Pain Location  hip  -MW     Pain Intervention(s)  Repositioned;Ambulation/increased activity  -     Row Name 01/15/21 0750          Plan of Care Review    Plan of Care Reviewed With  patient  -MW     Progress  no change  -MW     Outcome Summary  OT eval completed. Pt awake and alert in fowlers, abduction pillow in place. Comes to EOB with S and vc for maintaining RLE precautions. Dons undergarment EOB with Jose R and vc for activity modifications. Stands CGA with use of RW and amb within room CGA and vc. Toilets with S demo good balance and maintaining precautions with min vc. Pt demos need for ADL retraining in compliance with RLE precautions and AE training. Recommend d/c home w assist and HH/OP.  -     Row Name 01/15/21 0750          Therapy Assessment/Plan (OT)    Patient/Family Therapy Goal Statement (OT)  return home, decrease pain  -MW     Rehab Potential (OT)  good, to achieve stated therapy goals  -     Criteria for Skilled Therapeutic Interventions Met (OT)  yes;skilled treatment is necessary  -     Therapy Frequency (OT)  5 times/wk  -MW     Predicted Duration of Therapy Intervention (OT)  10 days  -     Row Name 01/15/21 0750          Therapy Plan Review/Discharge Plan (OT)    Anticipated Discharge Disposition (OT)  home with assist;home with home health;home with  outpatient therapy services  -     Row Name 01/15/21 0750          Positioning and Restraints    Pre-Treatment Position  in bed  -     Post Treatment Position  chair  -MW     In Chair  reclined;call light within reach;encouraged to call for assist;ABD pillow  -       User Key  (r) = Recorded By, (t) = Taken By, (c) = Cosigned By    Initials Name Provider Type     Adeola Payne, OTR/L Occupational Therapist        Outcome Measures     Row Name 01/15/21 0750          How much help from another is currently needed...    Putting on and taking off regular lower body clothing?  3  -MW     Bathing (including washing, rinsing, and drying)  3  -MW     Toileting (which includes using toilet bed pan or urinal)  3  -MW     Putting on and taking off regular upper body clothing  4  -MW     Taking care of personal grooming (such as brushing teeth)  4  -MW     Eating meals  4  -MW     AM-PAC 6 Clicks Score (OT)  21  -     Row Name 01/15/21 0750          Functional Assessment    Outcome Measure Options  AM-PAC 6 Clicks Daily Activity (OT)  -       User Key  (r) = Recorded By, (t) = Taken By, (c) = Cosigned By    Initials Name Provider Type    Adeola Sanchez, OTR/L Occupational Therapist        Occupational Therapy Education                 Title: PT OT SLP Therapies (Done)     Topic: Occupational Therapy (Done)     Point: ADL training (Done)     Description:   Instruct learner(s) on proper safety adaptation and remediation techniques during self care or transfers.   Instruct in proper use of assistive devices.              Learning Progress Summary           Patient Acceptance, E,D, VU,DU by DESHAWN at 1/15/2021 1002                   Point: Home exercise program (Done)     Description:   Instruct learner(s) on appropriate technique for monitoring, assisting and/or progressing therapeutic exercises/activities.              Learning Progress Summary           Patient Acceptance, E,D, VU,DU by DESHAWN at 1/15/2021 1002                    Point: Precautions (Done)     Description:   Instruct learner(s) on prescribed precautions during self-care and functional transfers.              Learning Progress Summary           Patient Acceptance, E,D, VU,DU by  at 1/15/2021 1002                   Point: Body mechanics (Done)     Description:   Instruct learner(s) on proper positioning and spine alignment during self-care, functional mobility activities and/or exercises.              Learning Progress Summary           Patient Acceptance, E,D, VU,DU by  at 1/15/2021 1002                               User Key     Initials Effective Dates Name Provider Type Discipline     08/28/18 -  Adeola Payne, OTR/L Occupational Therapist OT              OT Recommendation and Plan  Planned Therapy Interventions (OT): activity tolerance training, BADL retraining, functional balance retraining, occupation/activity based interventions, adaptive equipment training, patient/caregiver education/training, transfer/mobility retraining  Therapy Frequency (OT): 5 times/wk  Plan of Care Review  Plan of Care Reviewed With: patient  Progress: no change  Outcome Summary: OT eval completed. Pt awake and alert in fowlers, abduction pillow in place. Comes to EOB with S and vc for maintaining RLE precautions. Dons undergarment EOB with Jose R and vc for activity modifications. Stands CGA with use of RW and amb within room CGA and vc. Toilets with S demo good balance and maintaining precautions with min vc. Pt demos need for ADL retraining in compliance with RLE precautions and AE training. Recommend d/c home w assist and HH/OP.     Time Calculation:   Time Calculation- OT     Row Name 01/15/21 1002             Time Calculation- OT    OT Start Time  0745  -MW      OT Stop Time  0826  -MW      OT Time Calculation (min)  41 min  -MW      OT Received On  01/15/21  -      OT Goal Re-Cert Due Date  01/25/21  -        User Key  (r) = Recorded By, (t) = Taken By, (c) =  Cosigned By    Initials Name Provider Type    MW Adeola Payne, OTR/L Occupational Therapist        Therapy Charges for Today     Code Description Service Date Service Provider Modifiers Qty    [id number] HC OT EVAL LOW COMPLEXITY 3 1/15/2021 Adeola Payne, OTR/L GO 1               KATHYA Corado/KARUNA  1/15/2021

## 2021-01-15 NOTE — PLAN OF CARE
Goal Outcome Evaluation:  Plan of Care Reviewed With: patient  Progress: no change  Outcome Summary: Pt is A&Ox4 PRN pain meds given with good relief. Voiding per bedpan. Dressing to right hip is CDI. abduction pillow in place. VSS. Safety maintained.

## 2021-01-15 NOTE — DISCHARGE PLACEMENT REQUEST
"To: Legacy      From: Adrienne Dc [phone]          Laura Shin (65 y.o. Female)     Date of Birth Social Security Number Address Home Phone MRN    1955  [address] [facility] [address] [phone] [id number]    [employer] Marital Status          [employer]ism        Admission Date Admission Type Admitting Provider Attending Provider Department, Room/Bed    1/14/21 Elective Nino Carlson MD Kern, Brian, MD [facility] 3A, 356/1    Discharge Date Discharge Disposition Discharge Destination                       Attending Provider: Nino Carlson MD    Allergies: Atorvastatin, Amoxicillin, Escitalopram, Nabumetone, Niacin Er, Penicillins, Simvastatin    Isolation: None   Infection: None   Code Status: CPR    Ht: 166.5 cm (65.55\")   Wt: 94.9 kg (209 lb 3.5 oz)    Admission Cmt: None   Principal Problem: None                Active Insurance as of 1/14/2021     Primary Coverage     Payor Plan Insurance Group Employer/Plan Group    MEDICARE MEDICARE A & B      Payor Plan Address Payor Plan Phone Number Payor Plan Fax Number Effective Dates    [address] [phone]  3/1/2014 - None Entered    [facility] [address]       Subscriber Name Subscriber Birth Date Member ID       LAURA SHIN 1955 [id number]           Secondary Coverage     Payor Plan Insurance Group Employer/Plan Group    AARP MC SUP AARP HEALTH CARE OPTIONS      Payor Plan Address Payor Plan Phone Number Payor Plan Fax Number Effective Dates    [facility] [phone]  1/1/2019 - None Entered    [address]       [facility] [address]       Subscriber Name Subscriber Birth Date Member ID       LAURA SHIN 1955 [id number]                 Emergency Contacts      (Rel.) Home Phone Work Phone Mobile Phone    Gee hSin (Son) -- [phone] --               Operative/Procedure Notes (all)      Nion Carlson MD at 01/14/21 0841  Version 1 of 1       Date:  1/14/21    OPERATIVE " NOTE    PREOPERATIVE DIAGNOSIS:   1. 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 Repair right hip chronic massive gluteus medius and minimus tear     SURGEON:  Nino Carlson M.D.    ASSISTANT:  Silverio Parekh PA-C    ANESTHESIA:  General    ESTIMATED BLOOD LOSS:  Minimal    COMPLICATIONS:  None.    CONDITION:  Stable.    IMPLANTS:      INDICATION FOR PROCEDURE:  HISTORY OF PRESENT ILLNESS: This is a 65-year-old patient who presented to the outpatient clinic with complaints of long-standing left hip pain. The patient could not think of particular trauma. She did have tenderness to palpation over the trochanter. She had previous corticosteroid injections, oral anti-inflammatories and therapy, and despite these measures continued to have pain. An MRI demonstrated a massive chronic retracted tear of the gluteus medius and minimus tendons with significant heterotopic ossification around the trochanter.  Based on this, the decision was made to take the patient to the operating room for the above-mentioned procedure.      DESCRIPTION OF PROCEDURE:  The patient was interviewed in the preanesthesia area where the operative extremity was marked with a marking pen.  The patient was then taken to the operative suite were general endotracheal anesthesia was performed per the anesthesia team.  A timeout was called to confirm the patient, the operative site, the planned procedure, and the administration of antibiotics.  The patient was then prepped and draped in a standard sterile fashion using ChloraPrep.         DESCRIPTION OF PROCEDURE: The patient was interviewed in the preanesthesia area where the operative hip was marked with a marking pen. She was then taken to the operative suite where general endotracheal anesthesia was performed by the anesthesia team. A timeout was then called to confirm the patient, the  operative site, as well as the planned procedure and administration of antibiotics. The patient was prepped and draped in a standard sterile fashion using ChloraPrep. She was positioned in the lateral decubitus position on a pegboard.     An incision was then made and centered over the greater trochanter. Careful dissection was carried down to the iliotibial band. The iliotibial band was then divided in line with its fibers.upon dividing the IT band there was a large collection of serous fluid was evacuated.  The patient demonstrated an obvious chronic massive tear of the gluteus medius and minimus tendons with some retraction.  There is also the classic stranding of the tissue and the greater trochanter had the appearance of a chronic tear with bony overgrowth.  There were multiple loose areas of heterotopic ossification within the tendon and distal to the tendon which were removed.  The bone itself was then excoriated back to bleeding bone as the trochanter was recontoured.    I then carefully began freeing up the abductors.  Kocher retractors were used to grasp the tissue while an elevator was used to help elevate the tissue proximally.  There was a large cuff of viable tissue that could be reapproximated without significant tension.    At this point time 5 Agus [employer]omet 2.9 mm juggernaut anchors were then evenly spaced just distal to the tip of the trochanter.  Horizontal mattress sutures were then placed just distal to the musculotendinous junction beginning posteriorly and ending anteriorly.  The sutures were then tied from central to anterior and then posterior with the leg in a slightly abducted position.  I then crisscrossed a suture from each anchor one medial one lateral and placed these over a [employer] bio inductive implant which had been sewn in proximally at the musculotendinous junction with a 2-0 Vicryl suture.  The sutures were placed in a suture bridge type construct over the graft and the  native tissue and placed into 2 Agus Biomet 4.5 mm lateral row anchors at about the level of the vastus ridge.  I felt I was able to restore the footprint of the abductors.      The patient's wound was then irrigated with bulb syringe lavage. The deep tissue was approximated with #1 Vicryl suture and skin was approximated with 3-0 Vicryl and closed with a glue and mesh. The patient awoke from anesthesia without difficulty and was transferred to the PACU in stable condition. All sponge, needle and instrument counts were correct at the end of the procedure.     cc:         Nino Carlson M.D.      Electronically signed by Nino Carlson MD at 01/14/21 1341       Walker [] (Order [id number])  Order  Date: 1/15/2021 Department: [address] Ordering/Authorizing: Nino Carlson MD   Order History  Outpatient  Date/Time Action Taken User Additional Information   01/15/21 0953 Sign Jossie aCmpos RN Ordering Mode: Verbal with readback   Order Details    Frequency Duration Priority Order Class   None None Routine External   Start Date/Time    Start Date   01/15/21   Order Information    Order Date Service Start Date Start Time   01/15/21 Surgery 01/15/21    Reference Links    Associated Reports   View Encounter   Order Questions    Question Answer Comment   Equipment  Walker Folding with Wheels    Length of Need (99 Months = Lifetime) 99 Months = Lifetime    Note:  Custom values to enter length of need for DME          Verbal Order Info    Action Created on Order Mode Entered by Responsible Provider Signed by Signed on   Ordering 01/15/21 0953 Verbal with readback Jossie Campos RN Kern, Brian, MD     Source Order Set / Preference List    Preference List   [facility] PROCEDURES [[id number]]   Clinical Indications     ICD-10-CM ICD-9-CM   Muscle tear  - Primary     T14.8XXA 848.9   Impaired mobility     Z74.09 799.89   Essential hypertension     I10 401.9   Syncope, cardiogenic     R55 780.2   Reprint  Order Requisition    Walker (Order #[id number]) on 1/15/21   Encounter    View Encounter          Order Provider Info        Office phone Pager E-mail   Ordering User Jossie Campos RN -- -- --   Authorizing Provider Nino Carlson [phone] -- --   Attending Provider Nino Carlson [phone] -- --   Entered By Jossie Campos RN -- -- --   Ordering Provider Nino Carlson [phone] -- --   Linked Charges    No charges linked to this procedure   Tracking Reports  Cosign Tracking Order Transmittal Tracking   Authorized by:  Nino Carlson MD  (NPI: [id number])       Lab Component SmartPhrase Guide

## 2021-01-15 NOTE — PROGRESS NOTES
Orthopaedic Surgery Progress Note      1/15/2021   09:02 CST    Name:  Tawny Shin  MRN:    [id number]     Acct:     [id number]   Room:  [address] [place] [address] Day: 0     Admit Date: 1/14/2021  PCP: Davon Urrutia MD        Subjective:     C/C: POD 1 Day Post-Op RIGHT HIP GLUTEUS MEDLUS / MINIMUS REPAIR, POSSIBLE ACHILLES ALLOGRAFT (Right)    Interval History: Status: improved.  No acute events overnight.  Patient's pain relatively well controlled at the moment with current pain meds.  Patient hoping for d/c tomorrow.  She denies any loss of sensation, motor function, or constitutional symptoms.    Medications:     Allergies:   Allergies   Allergen Reactions   • Atorvastatin Other (See Comments)     LEG CRAMPS     • Amoxicillin Rash   • Escitalopram Unknown - Low Severity   • Nabumetone Rash   • Niacin Er Rash   • Penicillins Rash   • Simvastatin Rash       Current Meds:   Current Facility-Administered Medications   Medication Dose Route Frequency Provider Last Rate Last Admin   • aspirin EC tablet 81 mg  81 mg Oral Daily Nino Carlson MD   81 mg at 01/15/21 0833   • bisacodyl (DULCOLAX) suppository 10 mg  10 mg Rectal Daily PRN Nino Carlson MD       • budesonide (PULMICORT) nebulizer solution 0.5 mg  0.5 mg Nebulization BID - RT Nino Carlson MD   0.5 mg at 01/15/21 0737   • carvedilol (COREG) tablet 12.5 mg  12.5 mg Oral BID With Meals Nino Carlson MD   12.5 mg at 01/15/21 0832   • clopidogrel (PLAVIX) tablet 75 mg  75 mg Oral Daily Nino Carlson MD   75 mg at 01/15/21 0832   • diazePAM (VALIUM) tablet 5 mg  5 mg Oral Q6H PRN Nino Carlson MD       • diphenhydrAMINE (BENADRYL) capsule 25 mg  25 mg Oral Q6H PRN Nino Carlson MD       • folic acid (FOLVITE) tablet 1,000 mcg  1,000 mcg Oral Daily Nino Carlson MD   1,000 mcg at 01/15/21 0832   • furosemide (LASIX) tablet 40 mg  40 mg Oral Daily Nino Carlson MD       • guaiFENesin (MUCINEX) 12 hr tablet 600 mg  600 mg Oral Q12H Nino Carlson MD   600 mg at 01/15/21 2734   •  HYDROcodone-acetaminophen (NORCO)  MG per tablet 1 tablet  1 tablet Oral Q4H PRN Nino Carlson MD   1 tablet at 01/15/21 0832   • HYDROcodone-acetaminophen (NORCO) 5-325 MG per tablet 1 tablet  1 tablet Oral Q4H PRN Nino Carlson MD   1 tablet at 01/14/21 2149   • HYDROmorphone (DILAUDID) injection 0.5 mg  0.5 mg Intravenous Q2H PRN Nino Carlson MD        And   • naloxone (NARCAN) injection 0.4 mg  0.4 mg Intravenous Q5 Min PRN Nino Carlson MD       • isosorbide mononitrate (IMDUR) 24 hr tablet 60 mg  60 mg Oral BID Nino Carlson MD   60 mg at 01/15/21 0832   • leflunomide (ARAVA) tablet 20 mg  20 mg Oral Daily Nino Carlson MD   20 mg at 01/15/21 0832   • magnesium hydroxide (MILK OF MAGNESIA) suspension 2400 mg/10mL 10 mL  10 mL Oral Daily PRN Nino Carlson MD       • nitroglycerin (NITROSTAT) SL tablet 0.4 mg  0.4 mg Sublingual Q5 Min PRN Nino Carlson MD       • ondansetron (ZOFRAN) injection 4 mg  4 mg Intravenous Q6H PRN Nino Carlson MD       • ondansetron (ZOFRAN) tablet 4 mg  4 mg Oral Q6H PRN Nino Carlson MD       • pantoprazole (PROTONIX) EC tablet 40 mg  40 mg Oral Daily Nino Carlson MD   40 mg at 01/15/21 0832   • predniSONE (DELTASONE) tablet 10 mg  10 mg Oral Daily Nino Carlson MD   10 mg at 01/15/21 0833   • promethazine (PHENERGAN) tablet 12.5 mg  12.5 mg Oral Q4H PRN Nino Carlson MD        Or   • promethazine (PHENERGAN) suppository 12.5 mg  12.5 mg Rectal Q4H PRN Nino Carlson MD       • promethazine (PHENERGAN) tablet 12.5 mg  12.5 mg Oral Q4H PRN Nino Carlson MD       • sodium chloride 0.9 % infusion  100 mL/hr Intravenous Continuous Nino Carlson  mL/hr at 01/15/21 0302 100 mL/hr at 01/15/21 0302   • spironolactone (ALDACTONE) tablet 25 mg  25 mg Oral Daily Nino Carlson MD       • traMADol-acetaminophen (ULTRACET) 37.5-325 MG per tablet 1 tablet  1 tablet Oral BID PRN Nino Carlson MD               Data:     Code Status:    Code Status and Medical Interventions:   Ordered at: 01/14/21 0972     " Code Status:    CPR     Medical Interventions (Level of Support Prior to Arrest):    Full       Family History   Problem Relation Age of Onset   • Cancer Mother    • Heart disease Father        Social History     Socioeconomic History   • Marital status:      Spouse name: Not on file   • Number of children: Not on file   • Years of education: Not on file   • Highest education level: Not on file   Tobacco Use   • Smoking status: Never Smoker   • Smokeless tobacco: Never Used   Substance and Sexual Activity   • Alcohol use: No   • Drug use: Defer   • Sexual activity: Defer       Vitals:  /73 (BP Location: Right arm, Patient Position: Lying)   Pulse 62   Temp 97.9 °F (36.6 °C) (Oral)   Resp 18   Ht 166.5 cm (65.55\")   Wt 94.9 kg (209 lb 3.5 oz)   SpO2 96%   Breastfeeding No   BMI 34.23 kg/m²   Temp (24hrs), Av.9 °F (36.6 °C), Min:97.4 °F (36.3 °C), Max:98.8 °F (37.1 °C)      I/O (24Hr):    Intake/Output Summary (Last 24 hours) at 1/15/2021 0902  Last data filed at 2021 1610  Gross per 24 hour   Intake 1000 ml   Output 400 ml   Net 600 ml       Labs:  Lab Results (last 72 hours)     Procedure Component Value Units Date/Time    POC Glucose Once [[id number]]  (Normal) Collected: 21 1159    Specimen: Blood Updated: 21 1211     Glucose 118 mg/dL      Comment: : [id number] Sebastian CarouaMeter ID: [id number]                 Physical Exam:   General: NAD, cooperative with exam.  AAOx3.   Right Hip: Incision is clean, dry, intact. Dressing is Clean, Dry, Intact. Neurovascular intact distally. Moderate tenderness to palpation, soft throughout. No signs or symptoms of DVT or PE.      Assessment:     Primary Problem  POD 1 Day Post-Op RIGHT HIP GLUTEUS MEDLUS / MINIMUS REPAIR, POSSIBLE ACHILLES ALLOGRAFT (Right)        Plan:   1. Continue PT/OT  2. Continue DVT prophylaxis.  3. Continue PWB right lower extremity.  4. Anticipate discharge tomorrow if pain well controlled.  Patient staying " overnight tonight for further pain control and work with PT.   5. Abduction pillow in place when lying down      Electronically signed by Silverio Parekh PA-C on 1/15/2021 at 09:02 CST

## 2021-01-15 NOTE — PLAN OF CARE
Goal Outcome Evaluation:  Plan of Care Reviewed With: (P) patient  Progress: (P) no change  Outcome Summary: (P) PT evaluation is complete. Patient is oriented and alert x 4. Patient demonstrated the ability to perform all bed mobility transfers with supervision assist and verbal cues to maintain R hip precautions. Sit-stand, stand-sit transfers, and gait performed today with CGA assist and use of rolling walker. Patient ambulated with a step to gait pattern leading with the R LE. Patient experienced moderate fatigue post evaluation w decreased endurance. R hip strength testing deferred today due to R hip precautions. R knee extension strength was 2-/5 indicating a need for R LE strengthening. PT instructed the patient on the R hip precautions and the importance of adherence. Patient verbalized and demonstrated understanding. PT will continue to benefit from skilled PT in order to improve her functional deficits that are present post operation. PT recommends discharge to home with assist with home health care for home mobility assessment.

## 2021-01-15 NOTE — PLAN OF CARE
Goal Outcome Evaluation:  Plan of Care Reviewed With: patient  Progress: no change  Outcome Summary: OT eval completed. Pt awake and alert in fowlers, abduction pillow in place. Comes to EOB with S and vc for maintaining RLE precautions. Dons undergarment EOB with Jose R and vc for activity modifications. Stands CGA with use of RW and amb within room CGA and vc. Toilets with S demo good balance and maintaining precautions with min vc. Pt demos need for ADL retraining in compliance with RLE precautions and AE training. Recommend d/c home w assist and HH/OP.

## 2021-01-15 NOTE — THERAPY TREATMENT NOTE
Acute Care - Physical Therapy Treatment Note  [facility]     Patient Name: Tawny Shin  : 1955  MRN: [id number]  Today's Date: 1/15/2021           PT Assessment (last 12 hours)      PT Evaluation and Treatment     [facility] Name 01/15/21 1322          Physical Therapy Time and Intention    Subjective Information  complains of;weakness;pain  -     Document Type  therapy note (daily note)  -     Mode of Treatment  physical therapy  -[facility] Name 01/15/21 132          General Information    Existing Precautions/Restrictions  hip;fall no R hip flexion past 90*; no active R hip abduction; no R hip adduction past neutral; exit bed on left side  -[facility] Name 01/15/21 132          Pain Scale: Numbers Pre/Post-Treatment    Pretreatment Pain Rating  2/10  -     Posttreatment Pain Rating  4/10  -     Pain Location - Side  Right  -     Pain Location - Orientation  lateral  -     Pain Location  hip  -[facility] Name 01/15/21 132          Pain Scale: Word Pre/Post-Treatment    Pain Intervention(s)  Medication (See MAR);Repositioned;Ambulation/increased activity  -[facility] Name 01/15/21 1322          Bed Mobility    Bed Mobility  rolling left;supine-sit;sit-supine  -     Supine-Sit [place] (Bed Mobility)  verbal cues;contact guard;standby assist  -     Sit-Supine [place] (Bed Mobility)  contact guard;verbal cues  -     Assistive Device (Bed Mobility)  leg   -     Comment (Bed Mobility)  practiced in/out of bed x 2  -[facility] Name 01/15/21 132          Transfers    Transfers  stand-sit transfer  -     Assistive Device (Bed-Chair Transfers)  --  -     Sit-Stand [place] (Transfers)  contact guard;verbal cues;standby assist  -     Stand-Sit [place] (Transfers)  contact guard;verbal cues  -[facility] Name 01/15/21 1322          Sit-Stand Transfer    Assistive Device (Sit-Stand Transfers)  walker, front-wheeled  -[facility] Name 01/15/21 1322          Gait/Stairs  (Locomotion)    [place] Level (Gait)  contact guard;verbal cues;standby assist  -     Assistive Device (Gait)  walker, front-wheeled  -     Distance in Feet (Gait)  40  -     [place] Level (Stairs)  --  -[facility] Name 01/15/21 1322          Safety Issues, Functional Mobility    Impairments Affecting Function (Mobility)  endurance/activity tolerance;strength;range of motion (ROM);pain  -[facility] Name             Wound 01/14/21 0950 Right anterior hip Incision    Wound - Properties Group Placement Date: 01/14/21  -AS Placement Time: 0950  -AS Side: Right  -AS Orientation: anterior  -AS Location: hip  -AS Primary Wound Type: Incision  -AS    Retired Wound - Properties Group Date first assessed: 01/14/21  -AS Time first assessed: 0950  -AS Side: Right  -AS Location: hip  -AS Primary Wound Type: Incision  -AS    [facility] Name 01/15/21 1322          Bed Mobility Goal 1 (PT)    Activity/Assistive Device (Bed Mobility Goal 1, PT)  --  -     [place] Level/Cues Needed (Bed Mobility Goal 1, PT)  --  -     Time Frame (Bed Mobility Goal 1, PT)  --  -     Progress/Outcomes (Bed Mobility Goal 1, PT)  --  -AH     Row Name 01/15/21 1322          Transfer Goal 1 (PT)    Activity/Assistive Device (Transfer Goal 1, PT)  --  -     [place] Level/Cues Needed (Transfer Goal 1, PT)  --  -     Time Frame (Transfer Goal 1, PT)  --  -     Progress/Outcome (Transfer Goal 1, PT)  --  -AH     Row Name 01/15/21 1322          Gait Training Goal 1 (PT)    Activity/Assistive Device (Gait Training Goal 1, PT)  --  -     Keystone Level (Gait Training Goal 1, PT)  --  -     Distance (Gait Training Goal 1, PT)  --  -     Time Frame (Gait Training Goal 1, PT)  --  -     Progress/Outcome (Gait Training Goal 1, PT)  --  -AH     Row Name 01/15/21 1322          ROM Goal 1 (PT)    ROM Goal 1 (PT)  --  -     Time Frame (ROM Goal 1, PT)  --  -     Progress/Outcome (ROM Goal 1, PT)  --  -AH     Row Name  01/15/21 1322          Patient Education Goal (PT)    Activity (Patient Education Goal, PT)  --  -     Machias/Cues/Accuracy (Memory Goal 2, PT)  --  -     Time Frame (Patient Education Goal, PT)  --  -     Row Name 01/15/21 1322          Positioning and Restraints    Pre-Treatment Position  sitting in chair/recliner  -     Post Treatment Position  bed  -     In Bed  fowlers;call light within reach;encouraged to call for assist;SCD pump applied;ABD pillow  -     Row Name 01/15/21 1322          Therapy Assessment/Plan (PT)    Rehab Potential (PT)  --  -     Criteria for Skilled Interventions Met (PT)  --  -       User Key  (r) = Recorded By, (t) = Taken By, (c) = Cosigned By    Initials Name Provider Type     Olga Chambers, SERENA Physical Therapy Assistant    AS Miguelito Quintana, RN, BSN Registered Nurse        Physical Therapy Education                 Title: PT OT SLP Therapies (Done)     Topic: Physical Therapy (Done)     Point: Mobility training (Done)     Learning Progress Summary           Patient Acceptance, E,TB, VU by  at 1/15/2021 1404    Comment: bed mobility    Acceptance, E, VU by AB at 1/15/2021 0851    Comment: Pt educated on their R hip precautions and the role of PT in their care.                               User Key     Initials Effective Dates Name Provider Type formerly [facility] 08/02/16 -  Olga Chambers PTA Physical Therapy Assistant PT    AB 12/02/20 -  Familia Ambriz, PT Student PT Student PT              PT Recommendation and Plan             Time Calculation:   PT Charges     Row Name 01/15/21 1404 01/15/21 0756          Time Calculation    Start Time  1322  -  0751 5 min chart review; low complex 3  -SB (r) AB (t) SB (c)     Stop Time  1352  -  0830  -SB (r) AB (t) SB (c)     Time Calculation (min)  30 min  -  39 min  -SB (r) AB (t)     PT Received On  --  01/15/21  -SB (r) AB (t) SB (c)     PT Goal Re-Cert Due Date  --  01/25/21  -SB (r) AB (t) SB (c)         Time Calculation- PT    Total Timed Code Minutes- PT  30 minute(s)  -AH  --        Timed Charges    17000 - Gait Training Minutes   15  -AH  --     59737 - PT Therapeutic Activity Minutes  15  -AH  --       User Key  (r) = Recorded By, (t) = Taken By, (c) = Cosigned By    Initials Name Provider Type     Olga Chambers, SERENA Physical Therapy Assistant    Alyssa Roberts, PT DPT Physical Therapist    Familia Bruce, PT Student PT Student        Therapy Charges for Today     Code Description Service Date Service Provider Modifiers Qty    43626978017 HC GAIT TRAINING EA 15 MIN 1/15/2021 Olga Chambers, PTA GP 1    62774991317 HC PT THERAPEUTIC ACT EA 15 MIN 1/15/2021 Olga Chambers, PTA GP 1          PT G-Codes  Outcome Measure Options: AM-PAC 6 Clicks Basic Mobility (PT)  AM-PAC 6 Clicks Score (PT): 17  AM-PAC 6 Clicks Score (OT): 21    Olga Chambers PTA  1/15/2021

## 2021-01-16 ENCOUNTER — IMMUNIZATION (OUTPATIENT)
Dept: VACCINE CLINIC | Facility: HOSPITAL | Age: 66
End: 2021-01-16

## 2021-01-16 VITALS
BODY MASS INDEX: 33.62 KG/M2 | DIASTOLIC BLOOD PRESSURE: 64 MMHG | SYSTOLIC BLOOD PRESSURE: 142 MMHG | WEIGHT: 209.22 LBS | HEART RATE: 78 BPM | OXYGEN SATURATION: 95 % | HEIGHT: 66 IN | TEMPERATURE: 97.9 F | RESPIRATION RATE: 16 BRPM

## 2021-01-16 PROCEDURE — A9270 NON-COVERED ITEM OR SERVICE: HCPCS | Performed by: ORTHOPAEDIC SURGERY

## 2021-01-16 PROCEDURE — 97535 SELF CARE MNGMENT TRAINING: CPT

## 2021-01-16 PROCEDURE — 97530 THERAPEUTIC ACTIVITIES: CPT

## 2021-01-16 PROCEDURE — 63710000001 HYDROCODONE-ACETAMINOPHEN 5-325 MG TABLET: Performed by: ORTHOPAEDIC SURGERY

## 2021-01-16 PROCEDURE — 63710000001 CLOPIDOGREL 75 MG TABLET: Performed by: ORTHOPAEDIC SURGERY

## 2021-01-16 PROCEDURE — 63710000001 GUAIFENESIN 600 MG TABLET SUSTAINED-RELEASE 12 HOUR: Performed by: ORTHOPAEDIC SURGERY

## 2021-01-16 PROCEDURE — 63710000001 FUROSEMIDE 40 MG TABLET: Performed by: ORTHOPAEDIC SURGERY

## 2021-01-16 PROCEDURE — 63710000001 PREDNISONE PER 5 MG: Performed by: ORTHOPAEDIC SURGERY

## 2021-01-16 PROCEDURE — 0011A: CPT | Performed by: OBSTETRICS & GYNECOLOGY

## 2021-01-16 PROCEDURE — 63710000001 SPIRONOLACTONE 25 MG TABLET: Performed by: ORTHOPAEDIC SURGERY

## 2021-01-16 PROCEDURE — 63710000001 CARVEDILOL 6.25 MG TABLET: Performed by: ORTHOPAEDIC SURGERY

## 2021-01-16 PROCEDURE — 63710000001 ISOSORBIDE MONONITRATE 60 MG TABLET SUSTAINED-RELEASE 24 HOUR: Performed by: ORTHOPAEDIC SURGERY

## 2021-01-16 PROCEDURE — 94799 UNLISTED PULMONARY SVC/PX: CPT

## 2021-01-16 PROCEDURE — 63710000001 LEFLUNOMIDE 20 MG TABLET: Performed by: ORTHOPAEDIC SURGERY

## 2021-01-16 PROCEDURE — 63710000001 FOLIC ACID 1 MG TABLET: Performed by: ORTHOPAEDIC SURGERY

## 2021-01-16 PROCEDURE — 63710000001 ASPIRIN 81 MG TABLET DELAYED-RELEASE: Performed by: ORTHOPAEDIC SURGERY

## 2021-01-16 PROCEDURE — 91301 HC SARSCO02 VAC 100MCG/0.5ML IM: CPT | Performed by: OBSTETRICS & GYNECOLOGY

## 2021-01-16 PROCEDURE — 63710000001 PANTOPRAZOLE 40 MG TABLET DELAYED-RELEASE: Performed by: ORTHOPAEDIC SURGERY

## 2021-01-16 RX ADMIN — CARVEDILOL 12.5 MG: 6.25 TABLET, FILM COATED ORAL at 09:07

## 2021-01-16 RX ADMIN — BUDESONIDE 0.5 MG: 0.5 INHALANT RESPIRATORY (INHALATION) at 07:40

## 2021-01-16 RX ADMIN — FUROSEMIDE 40 MG: 40 TABLET ORAL at 09:08

## 2021-01-16 RX ADMIN — ISOSORBIDE MONONITRATE 60 MG: 60 TABLET, EXTENDED RELEASE ORAL at 09:08

## 2021-01-16 RX ADMIN — CLOPIDOGREL 75 MG: 75 TABLET, FILM COATED ORAL at 09:08

## 2021-01-16 RX ADMIN — HYDROCODONE BITARTRATE AND ACETAMINOPHEN 1 TABLET: 5; 325 TABLET ORAL at 04:08

## 2021-01-16 RX ADMIN — PANTOPRAZOLE SODIUM 40 MG: 40 TABLET, DELAYED RELEASE ORAL at 09:07

## 2021-01-16 RX ADMIN — PREDNISONE 10 MG: 10 TABLET ORAL at 09:08

## 2021-01-16 RX ADMIN — LEFLUNOMIDE 20 MG: 20 TABLET, FILM COATED ORAL at 09:08

## 2021-01-16 RX ADMIN — FOLIC ACID 1000 MCG: 1 TABLET ORAL at 09:08

## 2021-01-16 RX ADMIN — GUAIFENESIN 600 MG: 600 TABLET, EXTENDED RELEASE ORAL at 09:08

## 2021-01-16 RX ADMIN — SPIRONOLACTONE 25 MG: 25 TABLET ORAL at 09:07

## 2021-01-16 RX ADMIN — ASPIRIN 81 MG: 81 TABLET ORAL at 09:08

## 2021-01-16 RX ADMIN — HYDROCODONE BITARTRATE AND ACETAMINOPHEN 1 TABLET: 5; 325 TABLET ORAL at 09:07

## 2021-01-16 NOTE — PROGRESS NOTES
Continued Stay Note  [facility]     Patient Name: Tawny Shin  MRN: [id number]  Today's Date: 1/16/2021    Admit Date: 1/14/2021    Discharge Plan     Row Name 01/16/21 0813       Plan    Final Discharge Disposition Code  06 - home with home health care    Final Note  Pt is being discharged home with [facility]. Orders were already given to office yesterday. MEGA spoke with Olivia who is on call who is aware of discharge. Discharge summary faxed to office. Pt had walker delivered to home yesterday. SW will follow and assist with any other discharge needs that may arise.        Discharge Codes    No documentation.       Expected Discharge Date and Time     Expected Discharge Date Expected Discharge Time    Jan 16, 2021             Bianca Barragan

## 2021-01-16 NOTE — THERAPY DISCHARGE NOTE
Acute Care - Occupational Therapy Discharge Summary  [facility]     Patient Name: Tawny Shin  : 1955  MRN: [id number]    Today's Date: 2021                 Admit Date: 2021        OT Recommendation and Plan    Visit Dx:    ICD-10-CM ICD-9-CM   1. Muscle tear  T14.8XXA 848.9   2. Impaired mobility  Z74.09 799.89   3. Essential hypertension  I10 401.9   4. Syncope, cardiogenic  R55 780.2   5. Decreased activities of daily living (ADL)  Z78.9 V49.89         Time Calculation- OT     Row Name 21 0838             Time Calculation- OT    OT Start Time  0838  -MT      OT Stop Time  0940  -MT      OT Time Calculation (min)  62 min  -MT      Total Timed Code Minutes- OT  62 minute(s)  -MT      OT Received On  21  -MT         Timed Charges    63182 - OT Therapeutic Activity Minutes  20  -MT      41309 - OT Self Care/Mgmt Minutes  42  -MT        User Key  (r) = Recorded By, (t) = Taken By, (c) = Cosigned By    Initials Name Provider Type    MT Mirian Hanks COTA/L Occupational Therapy Assistant            Rehab Goal Summary     Row Name 21 1518             Transfer Goal 1 (OT)    Activity/Assistive Device (Transfer Goal 1, OT)  sit-to-stand/stand-to-sit;bed-to-chair/chair-to-bed;toilet;shower chair;walk-in shower;walker, rolling  -MT      [place] Level/Cues Needed (Transfer Goal 1, OT)  modified independence  -MT      Time Frame (Transfer Goal 1, OT)  long term goal (LTG);10 days  -MT      Progress/Outcome (Transfer Goal 1, OT)  goal not met  -MT         Bathing Goal 1 (OT)    Activity/Device (Bathing Goal 1, OT)  bathing skills, all;grab bar, tub/shower;shower chair  -MT      [place] Level/Cues Needed (Bathing Goal 1, OT)  modified independence AE PRN   -MT      Time Frame (Bathing Goal 1, OT)  long term goal (LTG);10 days  -MT      Progress/Outcomes (Bathing Goal 1, OT)  goal not met  -MT         Dressing Goal 1 (OT)    Activity/Device (Dressing Goal 1, OT)  lower body  dressing AE PRN   -MT      [place]/Cues Needed (Dressing Goal 1, OT)  modified independence  -MT      Time Frame (Dressing Goal 1, OT)  long term goal (LTG);10 days  -MT      Progress/Outcome (Dressing Goal 1, OT)  goal not met  -MT        User Key  (r) = Recorded By, (t) = Taken By, (c) = Cosigned By    Initials Name Provider Type Discipline    MT Mirian Hanks COTA/L Occupational Therapy Assistant OT              Timed Therapy Charges  Total Units: 4    Charges  Total Units: 4    Procedure Name Documented Minutes Units Code    HC OT SELF CARE/MGMT/TRAIN EA 15 MIN 42  3    70603 (CPT®)      HC OT THERAPEUTIC ACT EA 15 MIN 20  1    16628 (CPT®)               Documented Minutes  Total Minutes: 62    Therapy Provided Minutes    07018 - OT Self Care/Mgmt Minutes 42    37606 - OT Therapeutic Activity Minutes 20                Therapy Charges for Today     Code Description Service Date Service Provider Modifiers Qty    35850148195 HC OT THERAPEUTIC ACT EA 15 MIN 1/16/2021 Mirian Hanks COTA/L GO 1    65250014137 HC OT SELF CARE/MGMT/TRAIN EA 15 MIN 1/16/2021 Mirian Hanks COTA/L GO 3          OT Discharge Summary  Anticipated Discharge Disposition (OT): home with 24/7 care, home with home health, home with outpatient therapy services  Reason for Discharge: Discharge from facility  Outcomes Achieved: Refer to plan of care for updates on goals achieved  Discharge Destination: Home with assist      GUALBERTO Pappas  1/16/2021

## 2021-01-16 NOTE — PLAN OF CARE
Goal Outcome Evaluation:  Plan of Care Reviewed With: patient  Progress: no change  Outcome Summary: Drsg to right hip c/d.  Abduction pillow in place.  IID.  Up with assist to br.  Medicated for c/o pain.  Probable home today after covid vaccine.

## 2021-01-16 NOTE — THERAPY TREATMENT NOTE
Patient Name: Tawny Shin  : 1955    MRN: [id number]                              Today's Date: 2021       Admit Date: 2021    Visit Dx: Therapist utilized gait belt, applied non-slipped socks, provided fall risk education/prevention, & facilitated muscle strengthening PRN to reduce patient falls risk during this session.      ICD-10-CM ICD-9-CM   1. Muscle tear  T14.8XXA 848.9   2. Impaired mobility  Z74.09 799.89   3. Essential hypertension  I10 401.9   4. Syncope, cardiogenic  R55 780.2   5. Decreased activities of daily living (ADL)  Z78.9 V49.89     Patient Active Problem List   Diagnosis   • Eustachian tube dysfunction   • Sensorineural hearing loss   • Lumbago of multiple sites in spine with sciatica   • Spondylolisthesis of lumbar region   • Coronary artery disease   • Hyperlipidemia   • Hypertension   • Myalgia due to statin   • S/P coronary artery stent placement   • Pacemaker   • Seropositive rheumatoid arthritis of multiple sites (CMS/HCC)   • Sick sinus syndrome (CMS/HCC)   • Other osteoporosis without current pathological fracture   • Allergic-infective asthma   • Anemia of diabetes   • Arthritis of both knees   • Vasovagal syncope   • Bilateral bunions   • Bronchitis   • Cardiac pacemaker syndrome   • Charcot's joint of foot, left   • Constipation   • 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   • Intrinsic asthma   • Left carotid bruit   • Neutropenia (CMS/HCC)   • Obesity   • Primary osteoarthritis of left knee   • Psoriasis vulgaris   • Recurrent acute serous otitis media of both ears   • S/P lumbar laminectomy   • Thrombocytopenia (CMS/HCC)   • Hypothyroidism   • Vitamin D deficiency   • Myxedema heart disease   • Spondylolisthesis of lumbar region   • Syncope, cardiogenic   • Rupture of gluteus minimus tendon   • Muscle tear     Past Medical History:   Diagnosis Date   • Anemia of diabetes 2020   • Arthritis    • Asthma    • Chronic sinusitis    • Coronary artery disease    •  Eustachian tube dysfunction    • Heart disease    • Herpes simplex    • Hyperlipidemia    • Hypertension    • Knee dislocation    • Labral tear of right hip joint    • Laryngitis sicca    • Laryngitis, chronic    • MI (myocardial infarction) (CMS/HCC)    • Otorrhea    • Sensorineural hearing loss    • Sjogren's disease (CMS/HCC)      Past Surgical History:   Procedure Laterality Date   • A-V CARDIAC PACEMAKER INSERTION  2016   • ATRIAL CARDIAC PACEMAKER INSERTION     • CARDIAC CATHETERIZATION     • CATARACT EXTRACTION     • COLONOSCOPY  11/08/2011    One fold in the ascending colon which showed ulcer otherwise normal exam   • COLONOSCOPY  11/12/2004    Normal exam repeat in five years   • CORONARY ANGIOPLASTY WITH STENT PLACEMENT      X 2; 2013 & 2014   • ENDOSCOPY  07/10/2014    Normal exam   • HIP ABDUCTION TENOTOMY BILATERAL Right 1/14/2021    Procedure: RIGHT HIP GLUTEUS MEDLUS / MINIMUS REPAIR, POSSIBLE ACHILLES ALLOGRAFT;  Surgeon: Nino Carlson MD;  Location: [facility] OR;  Service: Orthopedics;  Laterality: Right;   • JOINT REPLACEMENT     • LUMBAR FUSION N/A 1/19/2018    Procedure: L3-4,L4-5 DECOMPRESSION, POSTERIOR SPINAL FUSION WITH INSTRUMENTATION;  Surgeon: Fortino Oropeza MD;  Location: [facility] OR;  Service:    • LUMBAR LAMINECTOMY WITH FUSION Left 1/17/2018    Procedure: LEFT L3-4 L4-5 LATERAL LUMBAR INTERBODY FUSION;  Surgeon: Fortino Oropeza MD;  Location: [facility] OR;  Service:    • MYRINGOTOMY W/ TUBES  09/04/2014    TUBES NO LONGER IN PLACE   • OTHER SURGICAL HISTORY      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 so hardware was removed and spacers were placed   • REPLACEMENT TOTAL KNEE Right      General Information     Row Name 01/16/21 0838          OT Time and Intention    Document Type  therapy note (daily note)  -MT     Mode of Treatment  occupational therapy  -MT     Row Name 01/16/21 0838          General Information    Patient Profile Reviewed  yes  -MT     Existing Precautions/Restrictions   hip;fall no R hip flexion past 90, no R active abduction, no adduction past neutral, exit on L side of bed  -MT     Row Name 01/16/21 0838          Cognition    Orientation Status (Cognition)  oriented x 4  -MT     Row Name 01/16/21 0838          Safety Issues, Functional Mobility    Impairments Affecting Function (Mobility)  endurance/activity tolerance;strength;range of motion (ROM);pain  -MT       User Key  (r) = Recorded By, (t) = Taken By, (c) = Cosigned By    Initials Name Provider Type    MT Mirian Hanks COTA/L Occupational Therapy Assistant          Mobility/ADL's     Row Name 01/16/21 0838          Bed Mobility    Bed Mobility  supine-sit  -MT     Supine-Sit [place] (Bed Mobility)  verbal cues;minimum assist (75% patient effort)  -MT     Comment (Bed Mobility)  to L side, simulated home without bed rail use, pt needed increased assist d/t flat bed/no bed rail  -[facility] Name 01/16/21 0838          Transfers    Transfers  sit-stand transfer;toilet transfer  -MT     Sit-Stand [place] (Transfers)  contact guard;verbal cues  -MT     Stand-Sit [place] (Transfers)  contact guard;verbal cues  -MT     [place] Level (Toilet Transfer)  contact guard;verbal cues  -MT     Assistive Device (Toilet Transfer)  commode;walker, front-wheeled  -MT     Row Name 01/16/21 0838          Sit-Stand Transfer    Assistive Device (Sit-Stand Transfers)  walker, front-wheeled  -[facility] Name 01/16/21 0838          Toilet Transfer    Type (Toilet Transfer)  sit-stand;stand-sit;stand pivot/stand step  -[facility] Name 01/16/21 0838          Functional Mobility    Functional Mobility- Ind. Level  contact guard assist;verbal cues required  -MT     Functional Mobility- Device  rolling walker  -MT     Functional Mobility- Safety Issues  sequencing ability decreased;step length decreased  -MT     Functional Mobility- Comment  VCs for RW use and bigger steps, using UEs to push into RW  -MT     Row Name 01/16/21 0838           Activities of Daily Living    BADL Assessment/Intervention  upper body dressing;lower body dressing;toileting  -MT     [facility] Name 01/16/21 0838          Lower Body Dressing Assessment/Training    [place] Level (Lower Body Dressing)  don;pants/bottoms;standby assist;socks;dependent (less than 25% patient effort)  -MT     Assistive Devices (Lower Body Dressing)  reacher  -MT     Position (Lower Body Dressing)  unsupported sitting;supported standing  -MT     Comment (Lower Body Dressing)  educated on hip kit and AE use/verbally educated on all items  -MT     Row Name 01/16/21 08          Toileting Assessment/Training    [place] Level (Toileting)  standby assist  -MT     Assistive Devices (Toileting)  commode  -MT     Position (Toileting)  unsupported sitting;supported standing  -MT     Row Name 01/16/21 08          Stand-Sit Transfer    Assistive Device (Stand-Sit Transfers)  walker, front-wheeled  -MT     Row Name 01/16/21 08          Upper Body Dressing Assessment/Training    [place] Level (Upper Body Dressing)  don;doff;bra/undergarment;pull-over garment;independent  -MT     Position (Upper Body Dressing)  supported sitting  -MT       User Key  (r) = Recorded By, (t) = Taken By, (c) = Cosigned By    Initials Name Provider Type    Mirian Best COTA/L Occupational Therapy Assistant        Obj/Interventions     Row Name 01/16/21 0838          Range of Motion Comprehensive    Comment, General Range of Motion  RLE ROM limited for active movement during dressing tasks  -MT     Row Name 01/16/21 0838          Balance    Static Standing Balance  WFL  -MT       User Key  (r) = Recorded By, (t) = Taken By, (c) = Cosigned By    Initials Name Provider Type    Mirian Best COTA/L Occupational Therapy Assistant        Goals/Plan    No documentation.       Clinical Impression     Row Name 01/16/21 0838          Pain Scale: Numbers Pre/Post-Treatment    Pretreatment Pain Rating  2/10  -MT      Posttreatment Pain Rating  2/10  -MT     Pain Location - Side  Right  -MT     Pain Location  hip  -MT     Pain Intervention(s)  Medication (See MAR);Ambulation/increased activity;Repositioned  -MT     Row Name 01/16/21 0838          Plan of Care Review    Plan of Care Reviewed With  patient;daughter  -MT     Progress  improving  -MT     Row Name 01/16/21 0838          Therapy Plan Review/Discharge Plan (OT)    Equipment Needs Upon Discharge (OT)  bathing equipment;hip kit;reacher;shower chair;walker, rolling  -MT     Anticipated Discharge Disposition (OT)  home with /7 care;home with home health;home with outpatient therapy services  -MT     Row Name 01/16/21 0838          Positioning and Restraints    Pre-Treatment Position  in bed  -MT     Post Treatment Position  chair  -MT     In Chair  call light within reach;encouraged to call for assist;with family/caregiver  -MT       User Key  (r) = Recorded By, (t) = Taken By, (c) = Cosigned By    Initials Name Provider Type    Mirian Best COTA/L Occupational Therapy Assistant        Outcome Measures     Row Name 01/16/21 0838          How much help from another is currently needed...    Putting on and taking off regular lower body clothing?  3  -MT     Bathing (including washing, rinsing, and drying)  3  -MT     Toileting (which includes using toilet bed pan or urinal)  3  -MT     Putting on and taking off regular upper body clothing  4  -MT     Taking care of personal grooming (such as brushing teeth)  4  -MT     Eating meals  4  -MT     AM-PAC 6 Clicks Score (OT)  21  -MT       User Key  (r) = Recorded By, (t) = Taken By, (c) = Cosigned By    Initials Name Provider Type    Mirian Best COTA/L Occupational Therapy Assistant        Occupational Therapy Education                 Title: PT OT SLP Therapies (Done)     Topic: Occupational Therapy (Done)     Point: ADL training (Done)     Description:   Instruct learner(s) on proper safety adaptation and  remediation techniques during self care or transfers.   Instruct in proper use of assistive devices.              Learning Progress Summary           Patient Acceptance, E,TB, VU by MT at 1/16/2021 0958    Comment: RW use, ADL modifications for following precautions, AE use, in/OOB    Acceptance, E,D, VU,DU by  at 1/15/2021 1002   Family Acceptance, E,TB, VU by MT at 1/16/2021 0958    Comment: RW use, ADL modifications for following precautions, AE use, in/OOB                   Point: Home exercise program (Done)     Description:   Instruct learner(s) on appropriate technique for monitoring, assisting and/or progressing therapeutic exercises/activities.              Learning Progress Summary           Patient Acceptance, E,D, VU,DU by  at 1/15/2021 1002                   Point: Precautions (Done)     Description:   Instruct learner(s) on prescribed precautions during self-care and functional transfers.              Learning Progress Summary           Patient Acceptance, E,TB, VU by MT at 1/16/2021 0958    Comment: RW use, ADL modifications for following precautions, AE use, in/OOB    Acceptance, E,D, VU,DU by  at 1/15/2021 1002   Family Acceptance, E,TB, VU by MT at 1/16/2021 0958    Comment: RW use, ADL modifications for following precautions, AE use, in/OOB                   Point: Body mechanics (Done)     Description:   Instruct learner(s) on proper positioning and spine alignment during self-care, functional mobility activities and/or exercises.              Learning Progress Summary           Patient Acceptance, E,TB, VU by MT at 1/16/2021 0958    Comment: RW use, ADL modifications for following precautions, AE use, in/OOB    Acceptance, E,D, VU,DU by  at 1/15/2021 1002   Family Acceptance, E,TB, VU by MT at 1/16/2021 0958    Comment: RW use, ADL modifications for following precautions, AE use, in/OOB                               User Key     Initials Effective Dates Name Provider Type Discipline     MW 08/28/18 -  Adeola Payne OTR/L Occupational Therapist OT    MT 11/29/19 -  Mirian Hanks COTA/L Occupational Therapy Assistant OT              OT Recommendation and Plan     Plan of Care Review  Plan of Care Reviewed With: patient, daughter  Progress: improving  Outcome Summary: supine>sit [place] simulated home bed flat/no bed rail to L side. Pt sit<>stands CGA. Fxl mobility <> BR and in room CGA with RW and VCs. Performed toileting CGA-S seated. Pt educated and demo's understanding/use of reacher for LB dressing SBA for pants, I for UB dressing seated. Educated verbally on hip kit use for ADL modifications d/t precautions. Recommend assist at home and possible OP services.     Time Calculation:   Time Calculation- OT     Row Name 01/16/21 0838             Time Calculation- OT    OT Start Time  0838  -MT      OT Stop Time  0940  -MT      OT Time Calculation (min)  62 min  -MT      Total Timed Code Minutes- OT  62 minute(s)  -MT      OT Received On  01/16/21  -MT         Timed Charges    07883 - OT Therapeutic Activity Minutes  20  -MT      34686 - OT Self Care/Mgmt Minutes  42  -MT        User Key  (r) = Recorded By, (t) = Taken By, (c) = Cosigned By    Initials Name Provider Type    MT Mirian Hanks COTA/L Occupational Therapy Assistant        Therapy Charges for Today     Code Description Service Date Service Provider Modifiers Qty    92327790385 HC OT THERAPEUTIC ACT EA 15 MIN 1/16/2021 Mirian Hanks COTA/L GO 1    66026916358 HC OT SELF CARE/MGMT/TRAIN EA 15 MIN 1/16/2021 Mirian Hanks COTA/L GO 3               GALLITO Pappas/KARUNA  1/16/2021

## 2021-01-16 NOTE — PLAN OF CARE
Problem: Adult Inpatient Plan of Care  Goal: Plan of Care Review  Flowsheets (Taken 1/16/2021 8490)  Progress: improving  Plan of Care Reviewed With:   patient   daughter  Outcome Summary: supine>sit [place] simulated home bed flat/no bed rail to L side. Pt sit<>stands CGA. Fxl mobility <> BR and in room CGA with RW and VCs. Performed toileting CGA-S seated. Pt educated and demo's understanding/use of reacher for LB dressing SBA for pants, I for UB dressing seated. Educated verbally on hip kit use for ADL modifications d/t precautions. Recommend assist at home and possible OP services. Needed frequent reminders not to bend at R hip

## 2021-01-16 NOTE — DISCHARGE PLACEMENT REQUEST
"Laura Shin (65 y.o. Female)     Date of Birth Social Security Number Address Home Phone MRN    1955  [id number] [facility] [address] [phone] [id number]    Sabianist Marital Status          Alevism        Admission Date Admission Type Admitting Provider Attending Provider Department, Room/Bed    21 Elective Nino Carlson MD Kern, Brian, MD [facility] 3A, 356/1    Discharge Date Discharge Disposition Discharge Destination         Home or Self Care              Attending Provider: Nino Carlson MD    Allergies: Atorvastatin, Amoxicillin, Escitalopram, Nabumetone, Niacin Er, Penicillins, Simvastatin    Isolation: None   Infection: None   Code Status: CPR    Ht: 166.5 cm (65.55\")   Wt: 94.9 kg (209 lb 3.5 oz)    Admission Cmt: None   Principal Problem: None                Active Insurance as of 2021     Primary Coverage     Payor Plan Insurance Group Employer/Plan Group    MEDICARE MEDICARE A & B      Payor Plan Address Payor Plan Phone Number Payor Plan Fax Number Effective Dates    [address] [phone]  3/1/2014 - None Entered    [facility] [address]       Subscriber Name Subscriber Birth Date Member ID       LAURA SHIN 1955 [id number]           Secondary Coverage     Payor Plan Insurance Group Employer/Plan Group    [employer]P AAR HEALTH C[employer] OPTIONS      Payor Plan Address Payor Plan Phone Number Payor Plan Fax Number Effective Dates    [facility] [phone]  2019 - None Entered    [address]       [facility] [address]       Subscriber Name Subscriber Birth Date Member ID       LAURA SHIN 1955 [id number]                 Emergency Contacts      (Rel.) Home Phone Work Phone Mobile Phone    Gee Shin (Son) -- [phone] --               Discharge Summary      Silverio Parekh PA-C at 21 0745          NAME: Laura Shin  : 1955  MRN: [id number]      Admission Date: 2021    Discharge Date: "     Final Diagnoses: Rupture of gluteus minimus tendon [S76.019A]  Muscle tear [T14.8XXA]    Procedures: 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  The patient stayed an additional night overnight for continue pain control and work with PT/OT.  She received the COVID vaccine while in the hospital as well.   At the time of discharge, the patient was afebrile, vitals stable, pain was controlled with oral medication, they were tolerating a by mouth diet, and voiding appropriately. Physical therapy and occupational therapy were consulted. At home instructions were discussed at length with the patient.  She voiced understanding and compliance with these instructions. Given these findings they were deemed suitable to be discharged.    Disposition: Home    Activity:PWB right lower    Wound Instructions: see postop instructions    Diet: regular diet    Resume home meds:   Medications Prior to Admission   Medication Sig Dispense Refill Last Dose   • carvedilol (COREG) 12.5 MG tablet Take 12.5 mg by mouth 2 (Two) Times a Day With Meals.   1/14/2021 at 0600   • Certolizumab Pegol (Cimzia) 2 X 200 MG kit Inject 400 mg under the skin into the appropriate area as directed Every 28 (Twenty-Eight) Days.   Past Month at Unknown time   • cloNIDine (CATAPRES) 0.1 MG tablet TAKE 2 TABLETS BY MOUTH EVERY NIGHT AT BEDTME AND AS NEEDED FOR BLOOD PRESSURE GREATER THAN 160/90 105 tablet 3 Past Month at Unknown time   • ezetimibe (ZETIA) 10 MG tablet Take 10 mg by mouth Daily.   1/13/2021 at Unknown time   • fluticasone (FLOVENT DISKUS) 50 MCG/BLIST diskus inhaler Inhale 1 puff 2 (Two) Times a Day.   1/13/2021 at Unknown time   • folic acid (FOLVITE) 1 MG tablet TAKE 1 TABLET BY MOUTH DAILY 90 tablet 3  1/13/2021 at Unknown time   • furosemide (LASIX) 40 MG tablet Take 40 mg by mouth daily.   1/13/2021 at Unknown time   • guaiFENesin (MUCINEX) 600 MG 12 hr tablet Take  by mouth.   1/13/2021 at Unknown time   • isosorbide mononitrate (IMDUR) 60 MG 24 hr tablet Take 1 tablet by mouth 2 (Two) Times a Day. 180 tablet 4 1/13/2021 at Unknown time   • leflunomide (ARAVA) 20 MG tablet Take 20 mg by mouth Daily.   1/13/2021 at Unknown time   • levothyroxine (SYNTHROID, LEVOTHROID) 75 MCG tablet    1/13/2021 at Unknown time   • MULTIPLE VITAMIN PO Take 1 tablet by mouth daily.     1/13/2021 at Unknown time   • nitroglycerin (NITROSTAT) 0.4 MG SL tablet Place 1 tablet under the tongue every 5 minutes as needed for Chest pain   1/13/2021 at Unknown time   • pantoprazole (PROTONIX) 40 MG EC tablet TAKE 1 TABLET BY MOUTH DAILY 90 tablet 3 1/13/2021 at Unknown time   • predniSONE (DELTASONE) 10 MG tablet Take 1 tablet by mouth Daily. (Patient taking differently: Take 5 mg by mouth Daily.) 30 tablet 1 1/13/2021 at Unknown time   • salsalate (DISALCID) 500 MG tablet TAKE 5 TABLETS ON MONDAY, WEDNESDAY, FRIDAY, AND SATURDAY, AND TAKE 4 TABLETS ON TUESDAYS, THURSDAYS, AND SUNDAYS 416 tablet 2 1/13/2021 at Unknown time   • spironolactone (ALDACTONE) 25 MG tablet TAKE 1 TABLET BY MOUTH DAILY 90 tablet 3 1/13/2021 at Unknown time   • traMADol-acetaminophen (ULTRACET) 37.5-325 MG per tablet TAKE 1 TABLET BY MOUTH TWICE DAILY AS NEEDED FOR MODERATE PAIN 120 tablet 1 1/14/2021 at 0600   • valACYclovir (VALTREX) 500 MG tablet Take 1 tablet by mouth Daily. 30 tablet 0 1/13/2021 at Unknown time   • vitamin D (ERGOCALCIFEROL) 1.25 MG (10695 UT) capsule capsule Take 1 capsule by mouth Every 7 (Seven) Days. 12 capsule 3 1/13/2021 at Unknown time   • aspirin 81 MG EC tablet Take 81 mg by mouth Daily.   1/9/2021   • clopidogrel (PLAVIX) 75 MG tablet Take 75 mg by mouth Daily.   1/9/2021       Prescriptions for: Percocet (E-prescribed)    Return to  Clinic: 2 weeks    Xrays: no    Silverio Parekh PA-C  1/16/2021  07:45 CST    Electronically signed by Silverio Parekh PA-C at 01/16/21 0727

## 2021-01-16 NOTE — THERAPY TREATMENT NOTE
Acute Care - Physical Therapy Treatment Note  [facility]     Patient Name: Tawny Shin  : 1955  MRN: [id number]  Today's Date: 2021           PT Assessment (last 12 hours)      PT Evaluation and Treatment     Row Name 21 0943          Physical Therapy Time and Intention    Subjective Information  no complaints  -KJ     Document Type  therapy note (daily note)  -KJ     Mode of Treatment  physical therapy  -KJ     Patient Effort  good  -KJ     Row Name 21 0943          General Information    Existing Precautions/Restrictions  hip;fall  -KJ     Row Name 21 0943          Pain Scale: Numbers Pre/Post-Treatment    Pretreatment Pain Rating  2/10  -KJ     Posttreatment Pain Rating  2/10  -KJ     Pain Location - Side  Right  -KJ     Pain Location  hip  -KJ     Row Name 21 0943          Transfers    Sit-Stand [place] (Transfers)  verbal cues;contact guard  -KJ     Stand-Sit [place] (Transfers)  contact guard;verbal cues  -KJ     Row Name 21 0943          Gait/Stairs (Locomotion)    Assistive Device (Stairs)  cane, straight  -KJ     Handrail Location (Stairs)  left side (ascending)  -KJ     Number of Steps (Stairs)  2  -KJ     Ascending Technique (Stairs)  step-to-step  -KJ     Descending Technique (Stairs)  step-to-step  -KJ     Row Name             Wound 21 0950 Right anterior hip Incision    Wound - Properties Group Placement Date: 21  -AS Placement Time: 950AS Side: Right  -AS Orientation: anterior  -AS Location: hip  -AS Primary Wound Type: Incision  -AS    Retired Wound - Properties Group Date first assessed: 21  -AS Time first assessed: 950  -AS Side: Right  -AS Location: hip  -AS Primary Wound Type: Incision  -AS    Row Name 21 0943          Positioning and Restraints    Pre-Treatment Position  sitting in chair/recliner  -KJ     In Bed  fowlers;call light within reach  -KJ       User Key  (r) = Recorded By, (t) = Taken By, (c) =  Cosigned By    Initials Name Provider Type    Emilee Gloria PTA Physical Therapy Assistant    AS Miguelito Quintana, RN, BSN Registered Nurse        Physical Therapy Education                 Title: PT OT SLP Therapies (Done)     Topic: Physical Therapy (Done)     Point: Mobility training (Done)     Learning Progress Summary           Patient Acceptance, E,TB, VU by  at 1/15/2021 1404    Comment: bed mobility    Acceptance, E, VU by AB at 1/15/2021 0851    Comment: Pt educated on their R hip precautions and the role of PT in their care.                               User Key     Initials Effective Dates Name Provider Type Discipline     08/02/16 -  Olga Chambers PTA Physical Therapy Assistant PT    AB 12/02/20 -  Familia Ambriz PT Student PT Student PT              PT Recommendation and Plan     Plan of Care Reviewed With: patient  Progress: improving  Outcome Summary: PT tx completed. Pt was instructed in ascending/descending stairs. Reinforced hip precautions. Plan for discharge today       Time Calculation:   PT Charges     Row Name 01/16/21 1001             Time Calculation    Start Time  0943  -KJ      Stop Time  1001  -KJ      Time Calculation (min)  18 min  -KJ      PT Received On  01/16/21  -KJ      PT Goal Re-Cert Due Date  01/25/21  -KJ         Time Calculation- PT    Total Timed Code Minutes- PT  18 minute(s)  -KJ        User Key  (r) = Recorded By, (t) = Taken By, (c) = Cosigned By    Initials Name Provider Type    Emilee Gloria PTA Physical Therapy Assistant        Therapy Charges for Today     Code Description Service Date Service Provider Modifiers Qty    30744943291 HC PT THERAPEUTIC ACT EA 15 MIN 1/16/2021 Emilee Paul PTA GP 1          PT G-Codes  Outcome Measure Options: AM-PAC 6 Clicks Basic Mobility (PT)  AM-PAC 6 Clicks Score (PT): 17  AM-PAC 6 Clicks Score (OT): 21    Emilee Paul PTA  1/16/2021

## 2021-01-16 NOTE — DISCHARGE SUMMARY
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  The patient stayed an additional night overnight for continue pain control and work with PT/OT.  She received the COVID vaccine while in the hospital as well.   At the time of discharge, the patient was afebrile, vitals stable, pain was controlled with oral medication, they were tolerating a by mouth diet, and voiding appropriately. Physical therapy and occupational therapy were consulted. At home instructions were discussed at length with the patient.  She voiced understanding and compliance with these instructions. Given these findings they were deemed suitable to be discharged.    Disposition: Home    Activity:PWB right lower    Wound Instructions: see postop instructions    Diet: regular diet    Resume home meds:   Medications Prior to Admission   Medication Sig Dispense Refill Last Dose   • carvedilol (COREG) 12.5 MG tablet Take 12.5 mg by mouth 2 (Two) Times a Day With Meals.   2021 at 0600   • Certolizumab Pegol (Cimzia) 2 X 200 MG kit Inject 400 mg under the skin into the appropriate area as directed Every 28 (Twenty-Eight) Days.   Past Month at Unknown time   • cloNIDine (CATAPRES) 0.1 MG tablet TAKE 2 TABLETS BY MOUTH EVERY NIGHT AT BEDTME AND AS NEEDED FOR BLOOD PRESSURE GREATER THAN 160/90 105 tablet 3 Past Month at Unknown time   • ezetimibe (ZETIA) 10 MG tablet Take 10 mg by mouth Daily.   2021 at Unknown time   • fluticasone (FLOVENT DISKUS) 50 MCG/BLIST diskus inhaler Inhale 1 puff 2 (Two) Times a Day.    1/13/2021 at Unknown time   • folic acid (FOLVITE) 1 MG tablet TAKE 1 TABLET BY MOUTH DAILY 90 tablet 3 1/13/2021 at Unknown time   • furosemide (LASIX) 40 MG tablet Take 40 mg by mouth daily.   1/13/2021 at Unknown time   • guaiFENesin (MUCINEX) 600 MG 12 hr tablet Take  by mouth.   1/13/2021 at Unknown time   • isosorbide mononitrate (IMDUR) 60 MG 24 hr tablet Take 1 tablet by mouth 2 (Two) Times a Day. 180 tablet 4 1/13/2021 at Unknown time   • leflunomide (ARAVA) 20 MG tablet Take 20 mg by mouth Daily.   1/13/2021 at Unknown time   • levothyroxine (SYNTHROID, LEVOTHROID) 75 MCG tablet    1/13/2021 at Unknown time   • MULTIPLE VITAMIN PO Take 1 tablet by mouth daily.     1/13/2021 at Unknown time   • nitroglycerin (NITROSTAT) 0.4 MG SL tablet Place 1 tablet under the tongue every 5 minutes as needed for Chest pain   1/13/2021 at Unknown time   • pantoprazole (PROTONIX) 40 MG EC tablet TAKE 1 TABLET BY MOUTH DAILY 90 tablet 3 1/13/2021 at Unknown time   • predniSONE (DELTASONE) 10 MG tablet Take 1 tablet by mouth Daily. (Patient taking differently: Take 5 mg by mouth Daily.) 30 tablet 1 1/13/2021 at Unknown time   • salsalate (DISALCID) 500 MG tablet TAKE 5 TABLETS ON MONDAY, WEDNESDAY, FRIDAY, AND SATURDAY, AND TAKE 4 TABLETS ON TUESDAYS, THURSDAYS, AND SUNDAYS 416 tablet 2 1/13/2021 at Unknown time   • spironolactone (ALDACTONE) 25 MG tablet TAKE 1 TABLET BY MOUTH DAILY 90 tablet 3 1/13/2021 at Unknown time   • traMADol-acetaminophen (ULTRACET) 37.5-325 MG per tablet TAKE 1 TABLET BY MOUTH TWICE DAILY AS NEEDED FOR MODERATE PAIN 120 tablet 1 1/14/2021 at 0600   • valACYclovir (VALTREX) 500 MG tablet Take 1 tablet by mouth Daily. 30 tablet 0 1/13/2021 at Unknown time   • vitamin D (ERGOCALCIFEROL) 1.25 MG (98960 UT) capsule capsule Take 1 capsule by mouth Every 7 (Seven) Days. 12 capsule 3 1/13/2021 at Unknown time   • aspirin 81 MG EC tablet Take 81 mg by mouth Daily.   1/9/2021   • clopidogrel (PLAVIX) 75 MG  tablet Take 75 mg by mouth Daily.   1/9/2021       Prescriptions for: Percocet (E-prescribed)    Return to Clinic: 2 weeks    Xrays: no    Silverio Parekh PA-C  1/16/2021  07:45 CST

## 2021-01-17 NOTE — THERAPY DISCHARGE NOTE
Acute Care - Physical Therapy Discharge Summary  [facility]       Patient Name: Tawny Shin  : 1955  MRN: [id number]    Today's Date: 2021                 Admit Date: 2021      PT Recommendation and Plan    Visit Dx:    ICD-10-CM ICD-9-CM   1. Muscle tear  T14.8XXA 848.9   2. Impaired mobility  Z74.09 799.89   3. Essential hypertension  I10 401.9   4. Syncope, cardiogenic  R55 780.2   5. Decreased activities of daily living (ADL)  Z78.9 V49.89               Rehab Goal Summary     Row Name 21 0741             Bed Mobility Goal 1 (PT)    Activity/Assistive Device (Bed Mobility Goal 1, PT)  bed mobility activities, all  -KINGS      [place] Level/Cues Needed (Bed Mobility Goal 1, PT)  independent  -KINGS      Time Frame (Bed Mobility Goal 1, PT)  long term goal (LTG);10 days  -KINGS      Progress/Outcomes (Bed Mobility Goal 1, PT)  goal met  -KINGS         Transfer Goal 1 (PT)    Activity/Assistive Device (Transfer Goal 1, PT)  transfers, all  -KINSG      [place] Level/Cues Needed (Transfer Goal 1, PT)  modified independence;other (see comments) assistive device use, FWW  -KINGS      Time Frame (Transfer Goal 1, PT)  10 days;long term goal (LTG)  -KINGS      Progress/Outcome (Transfer Goal 1, PT)  goal met  -KINGS         Gait Training Goal 1 (PT)    Activity/Assistive Device (Gait Training Goal 1, PT)  gait (walking locomotion);assistive device use;walker, rolling  -KINGS      [place] Level (Gait Training Goal 1, PT)  modified independence  -KINGS      Distance (Gait Training Goal 1, PT)  Patient to ambulate at least 100 feet with modified independence with no signs of fatigue or SOB.  -IKNGS      Time Frame (Gait Training Goal 1, PT)  long term goal (LTG);10 days  -KINGS      Progress/Outcome (Gait Training Goal 1, PT)  goal met  -KINGS         ROM Goal 1 (PT)    ROM Goal 1 (PT)  Patient to demonstrate R knee strength and ROM to WFL.  -KINGS      Time Frame (ROM Goal 1, PT)  long-term goal (LTG);5 days  -IKNGS       Progress/Outcome (ROM Goal 1, PT)  goal met  -KINGS         Patient Education Goal (PT)    Activity (Patient Education Goal, PT)  Patient to understand and teach back appropriate R hip precautions.  -KINGS      [place]/Cues/Accuracy (Memory Goal 2, PT)  independent;verbalizes understanding;demonstrates adequately  -KINGS      Time Frame (Patient Education Goal, PT)  long term goal (LTG);10 days  -KINGS      Progress/Outcome (Patient Education Goal, PT)  goal met  -KINGS        User Key  (r) = Recorded By, (t) = Taken By, (c) = Cosigned By    Initials Name Provider Type Discipline    Garcia Jaramillo, PTA Physical Therapy Assistant PT              PT Discharge Summary  Anticipated Discharge Disposition (PT): home  Reason for Discharge: Discharge from facility  Outcomes Achieved: Refer to plan of care for updates on goals achieved  Discharge Destination: Home      Garcia Francis PTA   1/17/2021

## 2021-01-22 ENCOUNTER — TELEPHONE (OUTPATIENT)
Dept: CARDIOLOGY | Facility: CLINIC | Age: 66
End: 2021-01-22

## 2021-01-22 NOTE — TELEPHONE ENCOUNTER
Patient called and left a voicemail wanting to discuss the paperwork for Kem she sent in to our office.  She can be reached at [phone].  Thank you!

## 2021-01-22 NOTE — TELEPHONE ENCOUNTER
Patient had mailed patient assistance forms to the wrong address. She was given our correct address and will mail them to us today. Beulah Robertson MA

## 2021-01-29 RX ORDER — CARVEDILOL 12.5 MG/1
12.5 TABLET ORAL 2 TIMES DAILY WITH MEALS
Qty: 180 TABLET | Refills: 4 | Status: ON HOLD | OUTPATIENT
Start: 2021-01-29 | End: 2021-03-23

## 2021-02-05 ENCOUNTER — TELEPHONE (OUTPATIENT)
Dept: CARDIOLOGY | Facility: CLINIC | Age: 66
End: 2021-02-05

## 2021-02-05 NOTE — TELEPHONE ENCOUNTER
Notified patient that [employer] denied her patient assistance for Repatha, but gave her the contact number for Repatha Ready. She will call them to see if they can provide assistance, and will let me know what they say. Beulah Robertson MA

## 2021-02-08 ENCOUNTER — APPOINTMENT (OUTPATIENT)
Dept: GENERAL RADIOLOGY | Facility: HOSPITAL | Age: 66
End: 2021-02-08

## 2021-02-08 ENCOUNTER — HOSPITAL ENCOUNTER (INPATIENT)
Facility: HOSPITAL | Age: 66
LOS: 3 days | Discharge: NURSING, REHAB OR LONG-TERM CARE | End: 2021-02-12
Attending: INTERNAL MEDICINE | Admitting: INTERNAL MEDICINE

## 2021-02-08 ENCOUNTER — APPOINTMENT (OUTPATIENT)
Dept: CT IMAGING | Facility: HOSPITAL | Age: 66
End: 2021-02-08

## 2021-02-08 DIAGNOSIS — M25.551 RIGHT HIP PAIN: ICD-10-CM

## 2021-02-08 DIAGNOSIS — Z74.09 IMPAIRED MOBILITY: ICD-10-CM

## 2021-02-08 DIAGNOSIS — R55 SYNCOPE, UNSPECIFIED SYNCOPE TYPE: ICD-10-CM

## 2021-02-08 DIAGNOSIS — R41.82 ALTERED MENTAL STATUS, UNSPECIFIED ALTERED MENTAL STATUS TYPE: Primary | ICD-10-CM

## 2021-02-08 DIAGNOSIS — S71.001A OPEN WOUND OF RIGHT HIP, INITIAL ENCOUNTER: ICD-10-CM

## 2021-02-08 DIAGNOSIS — L02.91 ABSCESS: ICD-10-CM

## 2021-02-08 PROBLEM — D72.829 LEUKOCYTOSIS: Status: ACTIVE | Noted: 2021-02-08

## 2021-02-08 PROBLEM — R74.8 ELEVATED CPK: Status: ACTIVE | Noted: 2021-02-08

## 2021-02-08 PROBLEM — R51.9 HEADACHE: Status: ACTIVE | Noted: 2021-02-08

## 2021-02-08 LAB
ALBUMIN SERPL-MCNC: 3.6 G/DL (ref 3.5–5.2)
ALBUMIN/GLOB SERPL: 1.2 G/DL
ALP SERPL-CCNC: 91 U/L (ref 39–117)
ALT SERPL W P-5'-P-CCNC: 13 U/L (ref 1–33)
ANION GAP SERPL CALCULATED.3IONS-SCNC: 10 MMOL/L (ref 5–15)
AST SERPL-CCNC: 25 U/L (ref 1–32)
BASOPHILS # BLD AUTO: 0.05 10*3/MM3 (ref 0–0.2)
BASOPHILS NFR BLD AUTO: 0.4 % (ref 0–1.5)
BILIRUB SERPL-MCNC: 0.7 MG/DL (ref 0–1.2)
BILIRUB UR QL STRIP: NEGATIVE
BUN SERPL-MCNC: 17 MG/DL (ref 8–23)
BUN/CREAT SERPL: 21 (ref 7–25)
CALCIUM SPEC-SCNC: 9.3 MG/DL (ref 8.6–10.5)
CHLORIDE SERPL-SCNC: 98 MMOL/L (ref 98–107)
CK SERPL-CCNC: 324 U/L (ref 20–180)
CLARITY UR: CLEAR
CO2 SERPL-SCNC: 28 MMOL/L (ref 22–29)
COLOR UR: YELLOW
CREAT SERPL-MCNC: 0.81 MG/DL (ref 0.57–1)
D DIMER PPP FEU-MCNC: 13.53 MG/L (FEU) (ref 0–0.5)
D-LACTATE SERPL-SCNC: 1.2 MMOL/L (ref 0.5–2)
DEPRECATED RDW RBC AUTO: 50.1 FL (ref 37–54)
EOSINOPHIL # BLD AUTO: 0.03 10*3/MM3 (ref 0–0.4)
EOSINOPHIL NFR BLD AUTO: 0.3 % (ref 0.3–6.2)
ERYTHROCYTE [DISTWIDTH] IN BLOOD BY AUTOMATED COUNT: 15.5 % (ref 12.3–15.4)
GFR SERPL CREATININE-BSD FRML MDRD: 71 ML/MIN/1.73
GLOBULIN UR ELPH-MCNC: 3.1 GM/DL
GLUCOSE SERPL-MCNC: 114 MG/DL (ref 65–99)
GLUCOSE UR STRIP-MCNC: NEGATIVE MG/DL
HCT VFR BLD AUTO: 37.1 % (ref 34–46.6)
HGB BLD-MCNC: 12.4 G/DL (ref 12–15.9)
HGB UR QL STRIP.AUTO: NEGATIVE
HOLD SPECIMEN: NORMAL
IMM GRANULOCYTES # BLD AUTO: 0.06 10*3/MM3 (ref 0–0.05)
IMM GRANULOCYTES NFR BLD AUTO: 0.5 % (ref 0–0.5)
KETONES UR QL STRIP: ABNORMAL
LEUKOCYTE ESTERASE UR QL STRIP.AUTO: NEGATIVE
LYMPHOCYTES # BLD AUTO: 1.43 10*3/MM3 (ref 0.7–3.1)
LYMPHOCYTES NFR BLD AUTO: 12.6 % (ref 19.6–45.3)
MCH RBC QN AUTO: 29.8 PG (ref 26.6–33)
MCHC RBC AUTO-ENTMCNC: 33.4 G/DL (ref 31.5–35.7)
MCV RBC AUTO: 89.2 FL (ref 79–97)
MONOCYTES # BLD AUTO: 1.18 10*3/MM3 (ref 0.1–0.9)
MONOCYTES NFR BLD AUTO: 10.4 % (ref 5–12)
NEUTROPHILS NFR BLD AUTO: 75.8 % (ref 42.7–76)
NEUTROPHILS NFR BLD AUTO: 8.58 10*3/MM3 (ref 1.7–7)
NITRITE UR QL STRIP: NEGATIVE
NRBC BLD AUTO-RTO: 0 /100 WBC (ref 0–0.2)
PH UR STRIP.AUTO: 5.5 [PH] (ref 5–8)
PLATELET # BLD AUTO: 191 10*3/MM3 (ref 140–450)
PMV BLD AUTO: 10.6 FL (ref 6–12)
POTASSIUM SERPL-SCNC: 4 MMOL/L (ref 3.5–5.2)
PROCALCITONIN SERPL-MCNC: 0.86 NG/ML (ref 0–0.25)
PROT SERPL-MCNC: 6.7 G/DL (ref 6–8.5)
PROT UR QL STRIP: ABNORMAL
RBC # BLD AUTO: 4.16 10*6/MM3 (ref 3.77–5.28)
SARS-COV-2 RDRP RESP QL NAA+PROBE: NORMAL
SODIUM SERPL-SCNC: 136 MMOL/L (ref 136–145)
SP GR UR STRIP: 1.02 (ref 1–1.03)
UROBILINOGEN UR QL STRIP: ABNORMAL
WBC # BLD AUTO: 11.33 10*3/MM3 (ref 3.4–10.8)
WHOLE BLOOD HOLD SPECIMEN: NORMAL
WHOLE BLOOD HOLD SPECIMEN: NORMAL

## 2021-02-08 PROCEDURE — 25010000002 VANCOMYCIN 10 G RECONSTITUTED SOLUTION: Performed by: PHYSICIAN ASSISTANT

## 2021-02-08 PROCEDURE — 87635 SARS-COV-2 COVID-19 AMP PRB: CPT | Performed by: PHYSICIAN ASSISTANT

## 2021-02-08 PROCEDURE — G0378 HOSPITAL OBSERVATION PER HR: HCPCS

## 2021-02-08 PROCEDURE — 83605 ASSAY OF LACTIC ACID: CPT | Performed by: PHYSICIAN ASSISTANT

## 2021-02-08 PROCEDURE — 72128 CT CHEST SPINE W/O DYE: CPT

## 2021-02-08 PROCEDURE — 85025 COMPLETE CBC W/AUTO DIFF WBC: CPT | Performed by: PHYSICIAN ASSISTANT

## 2021-02-08 PROCEDURE — 93010 ELECTROCARDIOGRAM REPORT: CPT | Performed by: INTERNAL MEDICINE

## 2021-02-08 PROCEDURE — 0 IOPAMIDOL PER 1 ML: Performed by: PHYSICIAN ASSISTANT

## 2021-02-08 PROCEDURE — 93005 ELECTROCARDIOGRAM TRACING: CPT | Performed by: PHYSICIAN ASSISTANT

## 2021-02-08 PROCEDURE — 72125 CT NECK SPINE W/O DYE: CPT

## 2021-02-08 PROCEDURE — 87150 DNA/RNA AMPLIFIED PROBE: CPT | Performed by: PHYSICIAN ASSISTANT

## 2021-02-08 PROCEDURE — 87040 BLOOD CULTURE FOR BACTERIA: CPT | Performed by: PHYSICIAN ASSISTANT

## 2021-02-08 PROCEDURE — 85379 FIBRIN DEGRADATION QUANT: CPT | Performed by: PHYSICIAN ASSISTANT

## 2021-02-08 PROCEDURE — 72131 CT LUMBAR SPINE W/O DYE: CPT

## 2021-02-08 PROCEDURE — 82550 ASSAY OF CK (CPK): CPT | Performed by: PHYSICIAN ASSISTANT

## 2021-02-08 PROCEDURE — 87070 CULTURE OTHR SPECIMN AEROBIC: CPT | Performed by: PHYSICIAN ASSISTANT

## 2021-02-08 PROCEDURE — 87077 CULTURE AEROBIC IDENTIFY: CPT | Performed by: PHYSICIAN ASSISTANT

## 2021-02-08 PROCEDURE — 71045 X-RAY EXAM CHEST 1 VIEW: CPT

## 2021-02-08 PROCEDURE — 84145 PROCALCITONIN (PCT): CPT | Performed by: PHYSICIAN ASSISTANT

## 2021-02-08 PROCEDURE — 80053 COMPREHEN METABOLIC PANEL: CPT | Performed by: PHYSICIAN ASSISTANT

## 2021-02-08 PROCEDURE — 87186 SC STD MICRODIL/AGAR DIL: CPT | Performed by: PHYSICIAN ASSISTANT

## 2021-02-08 PROCEDURE — P9612 CATHETERIZE FOR URINE SPEC: HCPCS

## 2021-02-08 PROCEDURE — 81003 URINALYSIS AUTO W/O SCOPE: CPT | Performed by: PHYSICIAN ASSISTANT

## 2021-02-08 PROCEDURE — 73502 X-RAY EXAM HIP UNI 2-3 VIEWS: CPT

## 2021-02-08 PROCEDURE — 87205 SMEAR GRAM STAIN: CPT | Performed by: PHYSICIAN ASSISTANT

## 2021-02-08 PROCEDURE — 87147 CULTURE TYPE IMMUNOLOGIC: CPT | Performed by: PHYSICIAN ASSISTANT

## 2021-02-08 PROCEDURE — 71275 CT ANGIOGRAPHY CHEST: CPT

## 2021-02-08 PROCEDURE — 87181 SC STD AGAR DILUTION PER AGT: CPT | Performed by: PHYSICIAN ASSISTANT

## 2021-02-08 PROCEDURE — 99285 EMERGENCY DEPT VISIT HI MDM: CPT

## 2021-02-08 PROCEDURE — 70450 CT HEAD/BRAIN W/O DYE: CPT

## 2021-02-08 RX ORDER — SODIUM CHLORIDE 0.9 % (FLUSH) 0.9 %
10 SYRINGE (ML) INJECTION AS NEEDED
Status: DISCONTINUED | OUTPATIENT
Start: 2021-02-08 | End: 2021-02-12 | Stop reason: HOSPADM

## 2021-02-08 RX ORDER — ACETAMINOPHEN 120 MG/1
120 SUPPOSITORY RECTAL ONCE
Status: COMPLETED | OUTPATIENT
Start: 2021-02-08 | End: 2021-02-08

## 2021-02-08 RX ORDER — ACETAMINOPHEN 500 MG
1000 TABLET ORAL ONCE
Status: COMPLETED | OUTPATIENT
Start: 2021-02-08 | End: 2021-02-08

## 2021-02-08 RX ORDER — TRAMADOL HYDROCHLORIDE 50 MG/1
50 TABLET ORAL EVERY 6 HOURS PRN
Status: DISCONTINUED | OUTPATIENT
Start: 2021-02-08 | End: 2021-02-12 | Stop reason: HOSPADM

## 2021-02-08 RX ADMIN — AZTREONAM 2 G: 2 INJECTION, POWDER, LYOPHILIZED, FOR SOLUTION INTRAMUSCULAR; INTRAVENOUS at 20:35

## 2021-02-08 RX ADMIN — VANCOMYCIN HYDROCHLORIDE 2000 MG: 10 INJECTION, POWDER, LYOPHILIZED, FOR SOLUTION INTRAVENOUS at 19:53

## 2021-02-08 RX ADMIN — ACETAMINOPHEN 120 MG: 120 SUPPOSITORY RECTAL at 20:35

## 2021-02-08 RX ADMIN — SODIUM CHLORIDE 1000 ML: 9 INJECTION, SOLUTION INTRAVENOUS at 21:00

## 2021-02-08 RX ADMIN — IOPAMIDOL 100 ML: 755 INJECTION, SOLUTION INTRAVENOUS at 21:26

## 2021-02-08 RX ADMIN — ACETAMINOPHEN 1000 MG: 500 TABLET, FILM COATED ORAL at 23:36

## 2021-02-09 ENCOUNTER — TELEPHONE (OUTPATIENT)
Dept: CARDIOLOGY | Facility: CLINIC | Age: 66
End: 2021-02-09

## 2021-02-09 ENCOUNTER — APPOINTMENT (OUTPATIENT)
Dept: CT IMAGING | Facility: HOSPITAL | Age: 66
End: 2021-02-09

## 2021-02-09 ENCOUNTER — APPOINTMENT (OUTPATIENT)
Dept: MRI IMAGING | Facility: HOSPITAL | Age: 66
End: 2021-02-09

## 2021-02-09 ENCOUNTER — ANESTHESIA (OUTPATIENT)
Dept: PERIOP | Facility: HOSPITAL | Age: 66
End: 2021-02-09

## 2021-02-09 ENCOUNTER — ANESTHESIA EVENT (OUTPATIENT)
Dept: PERIOP | Facility: HOSPITAL | Age: 66
End: 2021-02-09

## 2021-02-09 PROBLEM — M25.551 RIGHT HIP PAIN: Status: ACTIVE | Noted: 2021-02-09

## 2021-02-09 PROBLEM — R78.81 STAPHYLOCOCCUS AUREUS BACTEREMIA: Status: ACTIVE | Noted: 2021-02-09

## 2021-02-09 PROBLEM — B95.61 STAPHYLOCOCCUS AUREUS BACTEREMIA: Status: ACTIVE | Noted: 2021-02-09

## 2021-02-09 PROBLEM — E66.9 OBESITY (BMI 30-39.9): Status: ACTIVE | Noted: 2021-02-09

## 2021-02-09 LAB
ANION GAP SERPL CALCULATED.3IONS-SCNC: 8 MMOL/L (ref 5–15)
BACTERIA BLD CULT: ABNORMAL
BUN SERPL-MCNC: 16 MG/DL (ref 8–23)
BUN/CREAT SERPL: 24.2 (ref 7–25)
CALCIUM SPEC-SCNC: 8.7 MG/DL (ref 8.6–10.5)
CHLORIDE SERPL-SCNC: 105 MMOL/L (ref 98–107)
CHOLEST SERPL-MCNC: 129 MG/DL (ref 0–200)
CO2 SERPL-SCNC: 24 MMOL/L (ref 22–29)
CREAT SERPL-MCNC: 0.66 MG/DL (ref 0.57–1)
CRP SERPL-MCNC: 15.58 MG/DL (ref 0–0.5)
DEPRECATED RDW RBC AUTO: 51 FL (ref 37–54)
ERYTHROCYTE [DISTWIDTH] IN BLOOD BY AUTOMATED COUNT: 15.3 % (ref 12.3–15.4)
ERYTHROCYTE [SEDIMENTATION RATE] IN BLOOD: 21 MM/HR (ref 0–20)
GFR SERPL CREATININE-BSD FRML MDRD: 89 ML/MIN/1.73
GLUCOSE SERPL-MCNC: 109 MG/DL (ref 65–99)
HBA1C MFR BLD: 5.5 % (ref 4.8–5.6)
HCT VFR BLD AUTO: 33.5 % (ref 34–46.6)
HDLC SERPL-MCNC: 36 MG/DL (ref 40–60)
HGB BLD-MCNC: 10.6 G/DL (ref 12–15.9)
LDLC SERPL CALC-MCNC: 73 MG/DL (ref 0–100)
LDLC/HDLC SERPL: 1.98 {RATIO}
MCH RBC QN AUTO: 29 PG (ref 26.6–33)
MCHC RBC AUTO-ENTMCNC: 31.6 G/DL (ref 31.5–35.7)
MCV RBC AUTO: 91.8 FL (ref 79–97)
PLATELET # BLD AUTO: 166 10*3/MM3 (ref 140–450)
PMV BLD AUTO: 11.3 FL (ref 6–12)
POTASSIUM SERPL-SCNC: 3.6 MMOL/L (ref 3.5–5.2)
QT INTERVAL: 338 MS
QTC INTERVAL: 424 MS
RBC # BLD AUTO: 3.65 10*6/MM3 (ref 3.77–5.28)
SODIUM SERPL-SCNC: 137 MMOL/L (ref 136–145)
TRIGL SERPL-MCNC: 108 MG/DL (ref 0–150)
VLDLC SERPL-MCNC: 20 MG/DL (ref 5–40)
WBC # BLD AUTO: 10.5 10*3/MM3 (ref 3.4–10.8)

## 2021-02-09 PROCEDURE — 86140 C-REACTIVE PROTEIN: CPT | Performed by: INTERNAL MEDICINE

## 2021-02-09 PROCEDURE — 73700 CT LOWER EXTREMITY W/O DYE: CPT

## 2021-02-09 PROCEDURE — 87186 SC STD MICRODIL/AGAR DIL: CPT | Performed by: ORTHOPAEDIC SURGERY

## 2021-02-09 PROCEDURE — 94799 UNLISTED PULMONARY SVC/PX: CPT

## 2021-02-09 PROCEDURE — 85651 RBC SED RATE NONAUTOMATED: CPT | Performed by: INTERNAL MEDICINE

## 2021-02-09 PROCEDURE — 87205 SMEAR GRAM STAIN: CPT | Performed by: ORTHOPAEDIC SURGERY

## 2021-02-09 PROCEDURE — 83036 HEMOGLOBIN GLYCOSYLATED A1C: CPT | Performed by: NURSE PRACTITIONER

## 2021-02-09 PROCEDURE — 87176 TISSUE HOMOGENIZATION CULTR: CPT | Performed by: ORTHOPAEDIC SURGERY

## 2021-02-09 PROCEDURE — 87040 BLOOD CULTURE FOR BACTERIA: CPT | Performed by: ORTHOPAEDIC SURGERY

## 2021-02-09 PROCEDURE — 80061 LIPID PANEL: CPT | Performed by: NURSE PRACTITIONER

## 2021-02-09 PROCEDURE — 25010000002 SUCCINYLCHOLINE PER 20 MG: Performed by: NURSE ANESTHETIST, CERTIFIED REGISTERED

## 2021-02-09 PROCEDURE — 73721 MRI JNT OF LWR EXTRE W/O DYE: CPT

## 2021-02-09 PROCEDURE — 97165 OT EVAL LOW COMPLEX 30 MIN: CPT

## 2021-02-09 PROCEDURE — 85027 COMPLETE CBC AUTOMATED: CPT | Performed by: NURSE PRACTITIONER

## 2021-02-09 PROCEDURE — 0KBP0ZZ EXCISION OF LEFT HIP MUSCLE, OPEN APPROACH: ICD-10-PCS | Performed by: ORTHOPAEDIC SURGERY

## 2021-02-09 PROCEDURE — 25010000002 FENTANYL CITRATE (PF) 100 MCG/2ML SOLUTION: Performed by: NURSE ANESTHETIST, CERTIFIED REGISTERED

## 2021-02-09 PROCEDURE — 25010000002 HYDROMORPHONE PER 4 MG: Performed by: NURSE ANESTHETIST, CERTIFIED REGISTERED

## 2021-02-09 PROCEDURE — 94640 AIRWAY INHALATION TREATMENT: CPT

## 2021-02-09 PROCEDURE — 97161 PT EVAL LOW COMPLEX 20 MIN: CPT | Performed by: PHYSICAL THERAPIST

## 2021-02-09 PROCEDURE — 80048 BASIC METABOLIC PNL TOTAL CA: CPT | Performed by: NURSE PRACTITIONER

## 2021-02-09 PROCEDURE — 87070 CULTURE OTHR SPECIMN AEROBIC: CPT | Performed by: ORTHOPAEDIC SURGERY

## 2021-02-09 PROCEDURE — 87147 CULTURE TYPE IMMUNOLOGIC: CPT | Performed by: ORTHOPAEDIC SURGERY

## 2021-02-09 PROCEDURE — 25010000003 CEFAZOLIN PER 500 MG: Performed by: NURSE ANESTHETIST, CERTIFIED REGISTERED

## 2021-02-09 PROCEDURE — 25010000002 VANCOMYCIN PER 500 MG: Performed by: INTERNAL MEDICINE

## 2021-02-09 PROCEDURE — 70551 MRI BRAIN STEM W/O DYE: CPT

## 2021-02-09 PROCEDURE — 25010000002 PROPOFOL 10 MG/ML EMULSION: Performed by: NURSE ANESTHETIST, CERTIFIED REGISTERED

## 2021-02-09 PROCEDURE — 63710000001 PREDNISONE PER 5 MG: Performed by: NURSE PRACTITIONER

## 2021-02-09 PROCEDURE — 87077 CULTURE AEROBIC IDENTIFY: CPT | Performed by: ORTHOPAEDIC SURGERY

## 2021-02-09 RX ORDER — NALOXONE HCL 0.4 MG/ML
0.04 VIAL (ML) INJECTION AS NEEDED
Status: DISCONTINUED | OUTPATIENT
Start: 2021-02-09 | End: 2021-02-09 | Stop reason: HOSPADM

## 2021-02-09 RX ORDER — SODIUM CHLORIDE 0.9 % (FLUSH) 0.9 %
3 SYRINGE (ML) INJECTION EVERY 12 HOURS SCHEDULED
Status: DISCONTINUED | OUTPATIENT
Start: 2021-02-09 | End: 2021-02-09 | Stop reason: HOSPADM

## 2021-02-09 RX ORDER — IBUPROFEN 600 MG/1
600 TABLET ORAL ONCE AS NEEDED
Status: DISCONTINUED | OUTPATIENT
Start: 2021-02-09 | End: 2021-02-09 | Stop reason: HOSPADM

## 2021-02-09 RX ORDER — SODIUM CHLORIDE 0.9 % (FLUSH) 0.9 %
3-10 SYRINGE (ML) INJECTION AS NEEDED
Status: DISCONTINUED | OUTPATIENT
Start: 2021-02-09 | End: 2021-02-09 | Stop reason: HOSPADM

## 2021-02-09 RX ORDER — LEVOTHYROXINE SODIUM 0.07 MG/1
75 TABLET ORAL
Status: DISCONTINUED | OUTPATIENT
Start: 2021-02-09 | End: 2021-02-12 | Stop reason: HOSPADM

## 2021-02-09 RX ORDER — ASPIRIN 81 MG/1
81 TABLET ORAL DAILY
Status: DISCONTINUED | OUTPATIENT
Start: 2021-02-09 | End: 2021-02-12 | Stop reason: HOSPADM

## 2021-02-09 RX ORDER — SODIUM CHLORIDE 9 MG/ML
75 INJECTION, SOLUTION INTRAVENOUS CONTINUOUS
Status: DISCONTINUED | OUTPATIENT
Start: 2021-02-09 | End: 2021-02-10

## 2021-02-09 RX ORDER — ONDANSETRON 4 MG/1
4 TABLET, FILM COATED ORAL EVERY 6 HOURS PRN
Status: DISCONTINUED | OUTPATIENT
Start: 2021-02-09 | End: 2021-02-12 | Stop reason: HOSPADM

## 2021-02-09 RX ORDER — SUCCINYLCHOLINE CHLORIDE 20 MG/ML
INJECTION INTRAMUSCULAR; INTRAVENOUS AS NEEDED
Status: DISCONTINUED | OUTPATIENT
Start: 2021-02-09 | End: 2021-02-09 | Stop reason: SURG

## 2021-02-09 RX ORDER — FOLIC ACID 1 MG/1
1000 TABLET ORAL DAILY
Status: DISCONTINUED | OUTPATIENT
Start: 2021-02-09 | End: 2021-02-12 | Stop reason: HOSPADM

## 2021-02-09 RX ORDER — MIDAZOLAM HYDROCHLORIDE 1 MG/ML
1 INJECTION INTRAMUSCULAR; INTRAVENOUS
Status: DISCONTINUED | OUTPATIENT
Start: 2021-02-09 | End: 2021-02-09 | Stop reason: HOSPADM

## 2021-02-09 RX ORDER — ACETAMINOPHEN 650 MG/1
650 SUPPOSITORY RECTAL EVERY 4 HOURS PRN
Status: DISCONTINUED | OUTPATIENT
Start: 2021-02-09 | End: 2021-02-12 | Stop reason: HOSPADM

## 2021-02-09 RX ORDER — NITROGLYCERIN 0.4 MG/1
0.4 TABLET SUBLINGUAL
Status: DISCONTINUED | OUTPATIENT
Start: 2021-02-09 | End: 2021-02-12 | Stop reason: HOSPADM

## 2021-02-09 RX ORDER — VALACYCLOVIR HYDROCHLORIDE 500 MG/1
500 TABLET, FILM COATED ORAL DAILY
Status: DISCONTINUED | OUTPATIENT
Start: 2021-02-09 | End: 2021-02-12 | Stop reason: HOSPADM

## 2021-02-09 RX ORDER — OXYCODONE AND ACETAMINOPHEN 10; 325 MG/1; MG/1
1 TABLET ORAL ONCE AS NEEDED
Status: DISCONTINUED | OUTPATIENT
Start: 2021-02-09 | End: 2021-02-09 | Stop reason: HOSPADM

## 2021-02-09 RX ORDER — FENTANYL CITRATE 50 UG/ML
25 INJECTION, SOLUTION INTRAMUSCULAR; INTRAVENOUS
Status: DISCONTINUED | OUTPATIENT
Start: 2021-02-09 | End: 2021-02-09 | Stop reason: HOSPADM

## 2021-02-09 RX ORDER — SODIUM CHLORIDE 0.9 % (FLUSH) 0.9 %
10 SYRINGE (ML) INJECTION AS NEEDED
Status: DISCONTINUED | OUTPATIENT
Start: 2021-02-09 | End: 2021-02-12 | Stop reason: HOSPADM

## 2021-02-09 RX ORDER — ONDANSETRON 2 MG/ML
4 INJECTION INTRAMUSCULAR; INTRAVENOUS EVERY 6 HOURS PRN
Status: DISCONTINUED | OUTPATIENT
Start: 2021-02-09 | End: 2021-02-12 | Stop reason: HOSPADM

## 2021-02-09 RX ORDER — PANTOPRAZOLE SODIUM 40 MG/1
40 TABLET, DELAYED RELEASE ORAL DAILY
Status: DISCONTINUED | OUTPATIENT
Start: 2021-02-09 | End: 2021-02-12 | Stop reason: HOSPADM

## 2021-02-09 RX ORDER — HYDROMORPHONE HYDROCHLORIDE 1 MG/ML
0.5 INJECTION, SOLUTION INTRAMUSCULAR; INTRAVENOUS; SUBCUTANEOUS
Status: DISCONTINUED | OUTPATIENT
Start: 2021-02-09 | End: 2021-02-09 | Stop reason: HOSPADM

## 2021-02-09 RX ORDER — GUAIFENESIN 600 MG/1
600 TABLET, EXTENDED RELEASE ORAL EVERY 12 HOURS SCHEDULED
Status: DISCONTINUED | OUTPATIENT
Start: 2021-02-09 | End: 2021-02-12 | Stop reason: HOSPADM

## 2021-02-09 RX ORDER — CARVEDILOL 6.25 MG/1
12.5 TABLET ORAL 2 TIMES DAILY WITH MEALS
Status: DISCONTINUED | OUTPATIENT
Start: 2021-02-09 | End: 2021-02-12 | Stop reason: HOSPADM

## 2021-02-09 RX ORDER — TRAMADOL HYDROCHLORIDE 50 MG/1
50 TABLET ORAL EVERY 12 HOURS PRN
Status: DISCONTINUED | OUTPATIENT
Start: 2021-02-09 | End: 2021-02-09

## 2021-02-09 RX ORDER — CLOPIDOGREL BISULFATE 75 MG/1
75 TABLET ORAL DAILY
Status: DISCONTINUED | OUTPATIENT
Start: 2021-02-09 | End: 2021-02-12 | Stop reason: HOSPADM

## 2021-02-09 RX ORDER — BUDESONIDE 0.5 MG/2ML
0.5 INHALANT ORAL
Status: DISCONTINUED | OUTPATIENT
Start: 2021-02-09 | End: 2021-02-12 | Stop reason: HOSPADM

## 2021-02-09 RX ORDER — PREDNISONE 1 MG/1
5 TABLET ORAL DAILY
Status: DISCONTINUED | OUTPATIENT
Start: 2021-02-09 | End: 2021-02-11

## 2021-02-09 RX ORDER — ACETAMINOPHEN 325 MG/1
650 TABLET ORAL EVERY 4 HOURS PRN
Status: DISCONTINUED | OUTPATIENT
Start: 2021-02-09 | End: 2021-02-12 | Stop reason: HOSPADM

## 2021-02-09 RX ORDER — VANCOMYCIN HYDROCHLORIDE 1 G/200ML
1000 INJECTION, SOLUTION INTRAVENOUS EVERY 12 HOURS
Status: DISPENSED | OUTPATIENT
Start: 2021-02-09 | End: 2021-02-10

## 2021-02-09 RX ORDER — MAGNESIUM HYDROXIDE 1200 MG/15ML
LIQUID ORAL AS NEEDED
Status: DISCONTINUED | OUTPATIENT
Start: 2021-02-09 | End: 2021-02-09 | Stop reason: HOSPADM

## 2021-02-09 RX ORDER — LIDOCAINE HYDROCHLORIDE 10 MG/ML
0.5 INJECTION, SOLUTION EPIDURAL; INFILTRATION; INTRACAUDAL; PERINEURAL ONCE AS NEEDED
Status: DISCONTINUED | OUTPATIENT
Start: 2021-02-09 | End: 2021-02-09 | Stop reason: HOSPADM

## 2021-02-09 RX ORDER — FLUMAZENIL 0.1 MG/ML
0.2 INJECTION INTRAVENOUS AS NEEDED
Status: DISCONTINUED | OUTPATIENT
Start: 2021-02-09 | End: 2021-02-09 | Stop reason: HOSPADM

## 2021-02-09 RX ORDER — ISOSORBIDE MONONITRATE 60 MG/1
60 TABLET, EXTENDED RELEASE ORAL 2 TIMES DAILY
Status: DISCONTINUED | OUTPATIENT
Start: 2021-02-09 | End: 2021-02-12 | Stop reason: HOSPADM

## 2021-02-09 RX ORDER — ACETAMINOPHEN 160 MG/5ML
650 SOLUTION ORAL EVERY 4 HOURS PRN
Status: DISCONTINUED | OUTPATIENT
Start: 2021-02-09 | End: 2021-02-12 | Stop reason: HOSPADM

## 2021-02-09 RX ORDER — ACETAMINOPHEN 500 MG
1000 TABLET ORAL ONCE
Status: DISCONTINUED | OUTPATIENT
Start: 2021-02-09 | End: 2021-02-09 | Stop reason: HOSPADM

## 2021-02-09 RX ORDER — PROPOFOL 10 MG/ML
VIAL (ML) INTRAVENOUS AS NEEDED
Status: DISCONTINUED | OUTPATIENT
Start: 2021-02-09 | End: 2021-02-09 | Stop reason: SURG

## 2021-02-09 RX ORDER — LABETALOL HYDROCHLORIDE 5 MG/ML
5 INJECTION, SOLUTION INTRAVENOUS
Status: DISCONTINUED | OUTPATIENT
Start: 2021-02-09 | End: 2021-02-09 | Stop reason: HOSPADM

## 2021-02-09 RX ORDER — ONDANSETRON 2 MG/ML
4 INJECTION INTRAMUSCULAR; INTRAVENOUS AS NEEDED
Status: DISCONTINUED | OUTPATIENT
Start: 2021-02-09 | End: 2021-02-09 | Stop reason: HOSPADM

## 2021-02-09 RX ORDER — SODIUM CHLORIDE, SODIUM LACTATE, POTASSIUM CHLORIDE, CALCIUM CHLORIDE 600; 310; 30; 20 MG/100ML; MG/100ML; MG/100ML; MG/100ML
100 INJECTION, SOLUTION INTRAVENOUS CONTINUOUS
Status: DISCONTINUED | OUTPATIENT
Start: 2021-02-09 | End: 2021-02-10

## 2021-02-09 RX ORDER — CEFAZOLIN SODIUM 1 G/3ML
INJECTION, POWDER, FOR SOLUTION INTRAMUSCULAR; INTRAVENOUS AS NEEDED
Status: DISCONTINUED | OUTPATIENT
Start: 2021-02-09 | End: 2021-02-09 | Stop reason: SURG

## 2021-02-09 RX ORDER — LIDOCAINE HYDROCHLORIDE 20 MG/ML
INJECTION, SOLUTION EPIDURAL; INFILTRATION; INTRACAUDAL; PERINEURAL AS NEEDED
Status: DISCONTINUED | OUTPATIENT
Start: 2021-02-09 | End: 2021-02-09 | Stop reason: SURG

## 2021-02-09 RX ORDER — SODIUM CHLORIDE 0.9 % (FLUSH) 0.9 %
10 SYRINGE (ML) INJECTION EVERY 12 HOURS SCHEDULED
Status: DISCONTINUED | OUTPATIENT
Start: 2021-02-09 | End: 2021-02-12 | Stop reason: HOSPADM

## 2021-02-09 RX ORDER — MIDAZOLAM HYDROCHLORIDE 1 MG/ML
0.5 INJECTION INTRAMUSCULAR; INTRAVENOUS
Status: DISCONTINUED | OUTPATIENT
Start: 2021-02-09 | End: 2021-02-09 | Stop reason: HOSPADM

## 2021-02-09 RX ORDER — FENTANYL CITRATE 50 UG/ML
INJECTION, SOLUTION INTRAMUSCULAR; INTRAVENOUS AS NEEDED
Status: DISCONTINUED | OUTPATIENT
Start: 2021-02-09 | End: 2021-02-09 | Stop reason: SURG

## 2021-02-09 RX ADMIN — CEFAZOLIN 2 G: 330 INJECTION, POWDER, FOR SOLUTION INTRAMUSCULAR; INTRAVENOUS at 17:09

## 2021-02-09 RX ADMIN — AZTREONAM 2 G: 2 INJECTION, POWDER, LYOPHILIZED, FOR SOLUTION INTRAMUSCULAR; INTRAVENOUS at 05:47

## 2021-02-09 RX ADMIN — PANTOPRAZOLE SODIUM 40 MG: 40 TABLET, DELAYED RELEASE ORAL at 09:38

## 2021-02-09 RX ADMIN — GUAIFENESIN 600 MG: 600 TABLET, EXTENDED RELEASE ORAL at 09:34

## 2021-02-09 RX ADMIN — GUAIFENESIN 600 MG: 600 TABLET, EXTENDED RELEASE ORAL at 21:34

## 2021-02-09 RX ADMIN — TRAMADOL HYDROCHLORIDE 50 MG: 50 TABLET, FILM COATED ORAL at 12:37

## 2021-02-09 RX ADMIN — SODIUM CHLORIDE 75 ML/HR: 9 INJECTION, SOLUTION INTRAVENOUS at 02:19

## 2021-02-09 RX ADMIN — SUCCINYLCHOLINE CHLORIDE 80 MG: 20 INJECTION, SOLUTION INTRAMUSCULAR; INTRAVENOUS at 16:53

## 2021-02-09 RX ADMIN — CARVEDILOL 12.5 MG: 6.25 TABLET, FILM COATED ORAL at 09:34

## 2021-02-09 RX ADMIN — FENTANYL CITRATE 100 MCG: 50 INJECTION, SOLUTION INTRAMUSCULAR; INTRAVENOUS at 16:53

## 2021-02-09 RX ADMIN — TRAMADOL HYDROCHLORIDE 50 MG: 50 TABLET, FILM COATED ORAL at 22:13

## 2021-02-09 RX ADMIN — HYDROMORPHONE HYDROCHLORIDE 0.5 MG: 1 INJECTION, SOLUTION INTRAMUSCULAR; INTRAVENOUS; SUBCUTANEOUS at 18:11

## 2021-02-09 RX ADMIN — VANCOMYCIN HYDROCHLORIDE 1000 MG: 1 INJECTION, SOLUTION INTRAVENOUS at 09:43

## 2021-02-09 RX ADMIN — SODIUM CHLORIDE 75 ML/HR: 9 INJECTION, SOLUTION INTRAVENOUS at 20:51

## 2021-02-09 RX ADMIN — FOLIC ACID 1000 MCG: 1 TABLET ORAL at 09:35

## 2021-02-09 RX ADMIN — LEVOTHYROXINE SODIUM 75 MCG: 75 TABLET ORAL at 05:47

## 2021-02-09 RX ADMIN — ASPIRIN 81 MG: 81 TABLET, FILM COATED ORAL at 09:34

## 2021-02-09 RX ADMIN — LIDOCAINE HYDROCHLORIDE 60 MG: 20 INJECTION, SOLUTION EPIDURAL; INFILTRATION; INTRACAUDAL; PERINEURAL at 16:53

## 2021-02-09 RX ADMIN — BUDESONIDE 0.5 MG: 0.5 INHALANT RESPIRATORY (INHALATION) at 06:33

## 2021-02-09 RX ADMIN — ISOSORBIDE MONONITRATE 60 MG: 60 TABLET, EXTENDED RELEASE ORAL at 09:34

## 2021-02-09 RX ADMIN — AZTREONAM 2 G: 2 INJECTION, POWDER, LYOPHILIZED, FOR SOLUTION INTRAMUSCULAR; INTRAVENOUS at 22:43

## 2021-02-09 RX ADMIN — PROPOFOL 150 MG: 10 INJECTION, EMULSION INTRAVENOUS at 16:53

## 2021-02-09 RX ADMIN — FENTANYL CITRATE 25 MCG: 50 INJECTION, SOLUTION INTRAMUSCULAR; INTRAVENOUS at 18:16

## 2021-02-09 RX ADMIN — VALACYCLOVIR 500 MG: 500 TABLET, FILM COATED ORAL at 09:35

## 2021-02-09 RX ADMIN — FENTANYL CITRATE 25 MCG: 50 INJECTION, SOLUTION INTRAMUSCULAR; INTRAVENOUS at 18:21

## 2021-02-09 RX ADMIN — TRAMADOL HYDROCHLORIDE 50 MG: 50 TABLET, FILM COATED ORAL at 06:19

## 2021-02-09 RX ADMIN — SODIUM CHLORIDE, PRESERVATIVE FREE 10 ML: 5 INJECTION INTRAVENOUS at 09:38

## 2021-02-09 RX ADMIN — TRAMADOL HYDROCHLORIDE 50 MG: 50 TABLET, FILM COATED ORAL at 00:07

## 2021-02-09 RX ADMIN — CLOPIDOGREL 75 MG: 75 TABLET, FILM COATED ORAL at 09:35

## 2021-02-09 RX ADMIN — SODIUM CHLORIDE, POTASSIUM CHLORIDE, SODIUM LACTATE AND CALCIUM CHLORIDE 100 ML/HR: 600; 310; 30; 20 INJECTION, SOLUTION INTRAVENOUS at 16:40

## 2021-02-09 RX ADMIN — PREDNISONE 5 MG: 5 TABLET ORAL at 09:35

## 2021-02-09 RX ADMIN — BUDESONIDE 0.5 MG: 0.5 INHALANT RESPIRATORY (INHALATION) at 21:04

## 2021-02-09 RX ADMIN — SODIUM CHLORIDE, PRESERVATIVE FREE 10 ML: 5 INJECTION INTRAVENOUS at 02:19

## 2021-02-09 NOTE — ED PROVIDER NOTES
Subjective   History of Present Illness    Patient is a 65-year-old female presenting to ED via EMS with altered mental status.  Patient son at bedside to provide further history.  Son reports patient had a right hip tendon repair performed approximately 1 month ago performed by Dr. Carlson.  Reports that patient had been doing well until the past few days at which time she has developed mild confusion.  Son reports that today patient was at a clinic to receive an injection for her rheumatoid arthritis, repatha, at which time she had a vasovagal episode.  Son was called in and reported patient was initially confused however she quickly recovered.  Son reports that they took patient home at around 1:30 PM leaving her in her house at which time she was at her baseline mentation with no concerns.  Son reports that he was then contacted by patient's sister at around 5:30 PM when the sister went to check on patient and found her laying on her right side on the ground unconscious.  Son reports that since finding patient she has been altered and slow to respond which is significantly abnormal for her.  Son reports that earlier today before leaving her at 1:30 PM he checked her right hip incision site and noted that it was slightly red and was notable for serosanguineous discharge.  Reports that he placed a dressing on it which by the time he went back to her house after patient sister found her he reports that the dressing was saturated.  Upon arrival to the ED patient is tangential and altered in her speech however she does continue to state that she is having lower back pain.  Son reports concern for a possible infection or sepsis.  Son denies any other notable symptoms over the past few days including fevers, chills, diaphoresis, complaints of nausea/vomiting/diarrhea.  Son reports that patient has known problems with her right patella tendon however she has been at her baseline ambulation with her walker over the past few  weeks.    EMS administered no interventions in route.    Patient has known medication allergies to atorvastatin, amoxicillin, citalopram, nabumetone, niacin, penicillin, simvastatin.  Patient is a medical history positive for arthritis, history of MI, asthma, heart disease, chronic laryngitis, Sjogren's disease, hyperlipidemia, previous labral tear of the right hip joint, hypertension, coronary artery disease.  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  Patient's family report she has no use of cigarettes went for a proximal, marijuana, or any other IV/recreational/illicit drugs.    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    Review of Systems   Reason unable to perform ROS: Limited ability to obtain ROS due to altered mental status, son at bedside to provide history.   Constitutional: Negative for fever.   Cardiovascular: Negative for chest pain.   Gastrointestinal: Negative for vomiting.   Skin: Positive for wound (Drainage from right hip surgical incision).   Neurological: Positive for syncope (Vasovagal episode earlier in the day).        Reports + altered mental status   Psychiatric/Behavioral: Positive for confusion.       Past Medical History:   Diagnosis Date   • Arthritis    • Asthma    • Chronic sinusitis    • Coronary artery disease    • Eustachian tube dysfunction    • Heart disease    • Herpes simplex    • Hyperlipidemia    • Hypertension     • Knee dislocation    • Labral tear of right hip joint    • Laryngitis sicca    • Laryngitis, chronic    • MI (myocardial infarction) (CMS/HCC)    • Otorrhea    • Sensorineural hearing loss    • Sjogren's disease (CMS/HCC)        Allergies   Allergen Reactions   • Atorvastatin Other (See Comments)     LEG CRAMPS     • Amoxicillin Rash   • Escitalopram Unknown - Low Severity   • Nabumetone Rash   • Niacin Er Rash   • Penicillins Rash   • Simvastatin Rash       Past Surgical History:   Procedure Laterality Date   • A-V CARDIAC PACEMAKER INSERTION  2016   • ATRIAL CARDIAC PACEMAKER INSERTION     • CARDIAC CATHETERIZATION     • CATARACT EXTRACTION     • COLONOSCOPY  11/08/2011    One fold in the ascending colon which showed ulcer otherwise normal exam   • COLONOSCOPY  11/12/2004    Normal exam repeat in five years   • CORONARY ANGIOPLASTY WITH STENT PLACEMENT      X 2; 2013 & 2014   • ENDOSCOPY  07/10/2014    Normal exam   • HIP ABDUCTION TENOTOMY BILATERAL Right 1/14/2021    Procedure: RIGHT HIP GLUTEUS MEDLUS / MINIMUS REPAIR, POSSIBLE ACHILLES ALLOGRAFT;  Surgeon: Nino Carlson MD;  Location:  [facility] OR;  Service: Orthopedics;  Laterality: Right;   • JOINT REPLACEMENT     • LUMBAR FUSION N/A 1/19/2018    Procedure: L3-4,L4-5 DECOMPRESSION, POSTERIOR SPINAL FUSION WITH INSTRUMENTATION;  Surgeon: Fortino Oropeza MD;  Location:  [facility] OR;  Service:    • LUMBAR LAMINECTOMY WITH FUSION Left 1/17/2018    Procedure: LEFT L3-4 L4-5 LATERAL LUMBAR INTERBODY FUSION;  Surgeon: Fortino Oropeza MD;  Location:  [facility] OR;  Service:    • MYRINGOTOMY W/ TUBES  09/04/2014    TUBES NO LONGER IN PLACE   • OTHER SURGICAL HISTORY      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 so hardware was removed and spacers were placed   • REPLACEMENT TOTAL KNEE Right        Family History   Problem Relation Age of Onset   • Cancer Mother    • Heart disease Father        Social History     Socioeconomic History   • Marital status:      Spouse name:  Not on file   • Number of children: Not on file   • Years of education: Not on file   • Highest education level: Not on file   Tobacco Use   • Smoking status: Never Smoker   • Smokeless tobacco: Never Used   Substance and Sexual Activity   • Alcohol use: No   • Drug use: Never   • Sexual activity: Defer           Objective   Physical Exam  Vitals signs and nursing note reviewed.   Constitutional:       Appearance: Normal appearance. She is well-developed, well-groomed and overweight.   HENT:      Head: Normocephalic and atraumatic.      Mouth/Throat:      Mouth: Mucous membranes are moist.   Eyes:      General: No scleral icterus.     Extraocular Movements: Extraocular movements intact.      Right eye: No nystagmus.      Left eye: No nystagmus.      Pupils: Pupils are equal, round, and reactive to light.   Neck:      Musculoskeletal: Normal range of motion and neck supple.   Cardiovascular:      Rate and Rhythm: Normal rate and regular rhythm.   Pulmonary:      Effort: Pulmonary effort is normal. No respiratory distress.      Breath sounds: Normal breath sounds. No wheezing.   Chest:      Chest wall: No tenderness (Reproducible tenderness to palpitation of chest wall).   Abdominal:      General: Bowel sounds are normal. There are no signs of injury.      Palpations: Abdomen is soft.   Musculoskeletal:        Legs:       Comments: Vertical linear incision noted to right lateral hip consistent with recent surgical procedure.  Wound is well appearing to the inferior two thirds.  There is slight opening of the superior third with serosanguineous discharge.  Patient has a dressing placed over the wound which is saturated with the discharge as well as her clothing overlying this area.  Very slight surrounding erythema to this upper region with no further evidence of cellulitis.  No significant swelling.  Patient with no apparent tenderness to palpitation of this area.    Remainder of extremities are atraumatic with no  evidence of abrasion/laceration/contusions, joint swelling, or other acute abnormalities.    All 4 extremities neurovascularly intact distally.   Skin:     General: Skin is warm and dry.      Capillary Refill: Capillary refill takes less than 2 seconds.      Findings: No abrasion, ecchymosis, signs of injury, laceration or wound.   Neurological:      Mental Status: She is alert. She is confused.      GCS: GCS eye subscore is 4. GCS verbal subscore is 5. GCS motor subscore is 6.      Cranial Nerves: Cranial nerves are intact. No dysarthria or facial asymmetry.      Motor: No pronator drift.   Psychiatric:         Attention and Perception: Attention normal.         Mood and Affect: Mood normal.         Speech: Speech is tangential.         Behavior: Behavior is cooperative.         Procedures           ED Course  ED Course as of Feb 09 1357   Mon Feb 08, 2021 1949 CBC with leukocytosis of 11.33.  Decreased relative lymphocytes 12.6%.  ANC 8.58.  No other acute findings.Remainder of blood labs pending.Covid swab pending.Urine studies pending collection.X-ray and CT pending transportation to department.    [JS]   2000 Discussed case at this time with Dr. Shaheed Shaw who is at bedside for evaluation. Recommends addition of CK and aztreonam but is otherwise in agreement with treatment plan.    [JS]   2033 D-dimer elevated at 13.53.  Chest CTA ordered for further evaluation.    [JS]   2034 CK elevated at 324.CMP with hyperglycemia 114 and no other acute findings.  No anion gap.Procalcitonin elevated at 0.86.Wound culture pending.Covid swab pending.X-rays pending dictation.CT pending transportation to department.    [JS]   2055 COVID negative.     Lactic acid WNL 1.2.    X-ray continues to be pending dictation.CT continues to be pending transportation to department.    [JS]   2126 Patient in CT at this time.X-rays continue to be pending dictation.Urine studies with no evidence of infection or hematuria.  15 ketones,  trace protein.    [JS]   2129 CT pending dictation.     [JS]   2216 CXR shows: No acute cardiopulmonary process.    [JS]   2217 Head CT shows: 1. No CT evidence of an acute intracranial process.  2. Extensive paranasal sinus disease.    Cervical CT shows: 1. No CT evidence of acute bony injury to the cervical spine.  2. Advanced disc degeneration and spondylosis C5-C6 and to a lesser degree C6-C7 with resulting canal and foraminal stenosis.    Chest CTA shows: 1. No CT angiographic evidence of pulmonary embolus or acute aortic pathology.  2. Mild reticular groundglass reticular opacities in the right upper  lobe, etiology uncertain. Differential considerations includes mild  infectious/inflammatory changes. Mild pulmonary contusion as well as chronic interstitial lung changes considered. Probable mild atelectasis in the lower lobes. Lung fields are otherwise clear.  3. Mild deformity right anterior fourth rib, question old trauma.  Correlate with pedicle findings. No definite acute osseous abnormalities observed otherwise.      [JS]   2230 Thoracic CT shows: No CT evidence of acute bony injury to the thoracic spine.    [JS]   2240 Lumbar CT shows: 1. No CT evidence of acute bony injury to the lumbar spine.  2. Extensive postsurgical changes.  3. Advanced disc degeneration and spondylosis at L2-L3 and L1-L2.    [JS]   2242 Radiologist has been called.  Dr. Elizalde reports he is behind however he will dictate hip x-ray next.    [JS]   2245 Discussed case at this time with Dr. Paul, hospitalist.  Will kindly except patient for admission at this time under his services with no further recommendations or request.    [JS]   2300 XR hip shows: No acute osseous abnormality.    [JS]      ED Course User Index  [JS] Jordy St PA-C                                           MDM  Number of Diagnoses or Management Options  Altered mental status, unspecified altered mental status type:   Syncope, unspecified syncope type:       Amount and/or Complexity of Data Reviewed  Clinical lab tests: reviewed and ordered  Tests in the radiology section of CPT®: reviewed and ordered  Tests in the medicine section of CPT®: ordered and reviewed  Decide to obtain previous medical records or to obtain history from someone other than the patient: yes  Obtain history from someone other than the patient: yes (Son)  Review and summarize past medical records: yes  Discuss the patient with other providers: yes (Dr. Shaw (attending)  Dr. Paul (hospitalist))    Patient Progress  Patient progress: stable      Final diagnoses:   Altered mental status, unspecified altered mental status type   Syncope, unspecified syncope type            Jordy St PA-C  02/09/21 6142

## 2021-02-09 NOTE — PLAN OF CARE
Goal Outcome Evaluation:  Plan of Care Reviewed With: patient  Progress: no change  Outcome Summary: OT eval completed. Pt has RA, which mildly affects hand strength, ROM, and FMC. Pt was CGA-Min A for all t/fs. Pt demos good overall balance. Completed toileting and grooming w/ SBA. Pt c/o of no dizziness or lightheadness throughout session. Pt has appropriate DME and AE at home. Pt is aware of hip precautions and PWB status and is adhering to them. Further OT services not warranted. Recommend d/c to home w/ assist/

## 2021-02-09 NOTE — CONSULTS
INFECTIOUS DISEASES CONSULT NOTE    Patient:  Tawny Shin 65 y.o. female  ROOM # PAD OR/MAIN OR  YOB: 1955  MRN: [id number]  [facility]:  [id number]  Admit date: 2/8/2021   Admitting Physician: Moises Oliveira MD  Primary Care Physician: Davon Urrutia MD  REFERRING PROVIDER: No ref. provider found    Reason for Consultation: Infection right hip    History of Present Illness/Chief Complaint: Pleasant 65-year-old woman.  Known to me from previous consultation.  I saw her a few years ago with right knee infection.  Recently she tore gluteal muscles from her right hip area.  She required operative repair on January 14.  She indicates at her 2-week check everything was going fine.  A few days following her 2-week check she indicates development of a few spots of bloody drainage.  Seem to come from a small portion of the incision line.  She then developed some increasing bloody drainage.  She had some erythema as well.  She is not aware of having any fever at home.  She presented to the hospital yesterday.  Her initial temperature was 100.2.  She has not had subsequent temperature elevation.  She has had 1 set of blood cultures turn positive with ID pending.  Gram-positive cocci were identified but final work-up in progress.  Reviewed her MRI from today with Dr. Carlson and saw her with Dr. Carlson in the preanesthesia area.  She appears to have some fluid more superficial external to the iliotibial band.  She is going to undergo operative exploration, irrigation, and debridement.  I gave verbal orders to the OR staff for additional blood cultures.  Dr. Carlson will send blood cultures at the time of surgery.  We will start empiric antibiotics postoperatively.  Will continue to follow.    Current Scheduled Medications:   acetaminophen, 1,000 mg, Oral, Once  aspirin, 81 mg, Oral, Daily  aztreonam, 2 g, Intravenous, Q8H  budesonide, 0.5 mg, Nebulization, BID - RT  carvedilol, 12.5 mg, Oral, BID With  "Meals  clopidogrel, 75 mg, Oral, Daily  folic acid, 1,000 mcg, Oral, Daily  guaiFENesin, 600 mg, Oral, Q12H  isosorbide mononitrate, 60 mg, Oral, BID  levothyroxine, 75 mcg, Oral, Q AM  pantoprazole, 40 mg, Oral, Daily  predniSONE, 5 mg, Oral, Daily  [MAR Hold] sodium chloride, 10 mL, Intravenous, Q12H  sodium chloride, 3 mL, Intravenous, Q12H  valACYclovir, 500 mg, Oral, Daily  vancomycin, 1,000 mg, Intravenous, Q12H      Current PRN Medications:  •  [MAR Hold] acetaminophen **OR** [MAR Hold] acetaminophen **OR** [MAR Hold] acetaminophen  •  lidocaine PF 1%  •  midazolam **OR** midazolam  •  nitroglycerin  •  [MAR Hold] ondansetron **OR** [MAR Hold] ondansetron  •  sodium chloride  •  [COMPLETED] Insert peripheral IV **AND** sodium chloride  •  [MAR Hold] sodium chloride  •  sodium chloride  •  [MAR Hold] traMADol    Allergies:    Allergies   Allergen Reactions   • Atorvastatin Other (See Comments)     LEG CRAMPS     • Amoxicillin Rash   • Escitalopram Unknown - Low Severity   • Nabumetone Rash   • Niacin Er Rash   • Penicillins Rash   • Simvastatin Rash     Past Medical History: Degenerative arthritis.  Coronary artery disease.  Herpes simplex.  Hypertension.  Rheumatoid arthritis.  Sjogren's syndrome.  Sensorineural hearing loss.    Past Surgical History: Pacemaker insertion.  Cataract extraction.  Coronary artery stenting.  Knee arthroplasty.  Lumbar laminectomy.  Myringotomy tube placement.    Social History: .  No tobacco, alcohol, or illicit drug use.    Family History: Cancer and heart disease.    Exposure History: No close contacts have been ill    Review of Systems  No cough or shortness of breath  No nausea vomiting  No diarrhea  No urinary symptoms    Vital Signs:  /65 (BP Location: Right arm, Patient Position: Standing)   Pulse 77   Temp 98.5 °F (36.9 °C) (Oral)   Resp 16   Ht 170.2 cm (67\")   Wt 95.1 kg (209 lb 9.6 oz)   SpO2 95%   BMI 32.83 kg/m²  Temp (24hrs), Av.2 °F (37.3 " °C), Min:98.5 °F (36.9 °C), Max:100.2 °F (37.9 °C)    Physical Exam  Vital signs - reviewed.  Line/IV site - No erythema or tenderness.  Heart without murmur  Lungs without crackles  Abdomen soft nontender  Right hip incision with a small area of separation about two thirds the way distally on the incision.  Minimal drainage currently.  Dressing was in place had some slightly cloudy drainage.  There are some mild surrounding erythema.    Lab Results:  CBC:   Results from last 7 days   Lab Units 02/09/21  0426 02/08/21  1936   WBC 10*3/mm3 10.50 11.33*   HEMOGLOBIN g/dL 10.6* 12.4   HEMATOCRIT % 33.5* 37.1   PLATELETS 10*3/mm3 166 191     CMP:   Results from last 7 days   Lab Units 02/09/21  0426 02/08/21 1936   SODIUM mmol/L 137 136   POTASSIUM mmol/L 3.6 4.0   CHLORIDE mmol/L 105 98   CO2 mmol/L 24.0 28.0   BUN mg/dL 16 17   CREATININE mg/dL 0.66 0.81   CALCIUM mg/dL 8.7 9.3   BILIRUBIN mg/dL  --  0.7   ALK PHOS U/L  --  91   ALT (SGPT) U/L  --  13   AST (SGOT) U/L  --  25   GLUCOSE mg/dL 109* 114*     Blood cultures yesterday-BC ID suggesting staph aureus    Radiology:   CT angiogram of the chest done yesterday:  IMPRESSION:  1. No CT angiographic evidence of pulmonary embolus or acute aortic  pathology.  2. Mild reticular groundglass reticular opacities in the right upper  lobe, etiology uncertain. Differential considerations includes mild  infectious/inflammatory changes. Mild pulmonary contusion as well as  chronic interstitial lung changes considered. Probable mild atelectasis  in the lower lobes. Lung fields are otherwise clear.  3. Mild deformity right anterior fourth rib, question old trauma.  Correlate with pedicle findings. No definite acute osseous abnormalities  observed otherwise.  This report was finalized on 02/08/2021 22:01 by Dr. Sterling Elizalde MD.    Additional Studies Reviewed:     Impression:   1.  Possible culture showed staph aureus  2.  Right hip infection following operative repair of torn  gluteus musculature  3.  History of pacemaker insertion  4.  Rheumatoid arthritis    Recommendations:    Continue empiric treatment vancomycin and aztreonam  Await final blood and operative cultures  Await susceptibility testing  Follow-up blood cultures to make sure bacteremia clearing  Watch for metastatic foci of staph infection  Continue to follow        Elie Ohara MD  02/09/21  16:28 CST

## 2021-02-09 NOTE — PLAN OF CARE
Goal Outcome Evaluation:  Plan of Care Reviewed With: patient  Progress: no change  Outcome Summary: PT eval is complete. Pt O&Ax4 and cooperative with PT. Pt had been up with nsg prior to PT arrival. Pt reported she walked to bathroom and back to bed with no FWW. PT educated pt to use FWW at all times when walking in order to maintain PWB on her RLE to protect her surgical site. Pt was able to verbalize and maintain her hip precuations throughout mobility assessment. ROM and strength deficits noted in RLE with more significant deficit in hip flexion and knee extension. LLE ROM and MMT WFL. Pt able to ambulate 40 feet with CGA and FWW. Bed mobility performed with supervision assist. Supine-sit transfer SBA. Sit-stand from bed and toilet with CGA indicating functional LE strength WFL. Pt offered no complaints of dizziness or lightheadedness throughout PT eval. Pt to benefit from PT services during this hospital stay to improve RLE strength, ROM, and functional mobility. PT recommends discharge to home with assist at this time.

## 2021-02-09 NOTE — OP NOTE
Operative Report    Pt Name: Tawny Shin  MRN: [id number]  YOB: 1955  Date: 2/9/2021    PREOPERATIVE DIAGNOSIS:  1.  Right hip postoperative wound dehiscence and infection, patient status post gluteus medius minimus repair    POSTOPERATIVE DIAGNOSIS:    1.   Right hip postoperative wound dehiscence and infection, patient status post gluteus medius minimus repair    PROCEDURE:    1.   I&D of a right hip wound dehiscence and infection with evacuation of seroma  2.   Application of vacuum-assisted dressing to right hip wound dehiscence     SURGEON:  Nino Carlson M.D.    ASSISTANT: Silveroi Parekh PA-C    ANESTHESIA:  General    ESTIMATED BLOOD LOSS: Minimal    COMPLICATIONS:  None.    CONDITION:  Stable.    INDICATION FOR PROCEDURE: The patient is a 65-year-old female who is 3 weeks postop repair of a massive gluteus medius and minimus tear of the right hip with placement of an allograft patch.  At the 2-week follow-up the patient's incision was clean dry and intact and she appeared to be doing well.  She was admitted to the hospital yesterday after a syncopal episode when she was found down.  On exam she demonstrated pain and drainage from her hip with concerns for infection.  CT scan showed no obvious fluid collection.  An MRI did show a fluid collection superficial to the IT band that tracked into the surgical site over the trochanter at the distal aspect of the IT band.  Based on these findings we elected to take her to the operating room urgently for an I&D in hopes of saving the repair.    DESCRIPTION OF PROCEDURE:  The patient was interviewed in the preanesthesia area where the operative extremity was marked with a marking pen.  The patient was then taken to the operative suite where general endotracheal anesthesia was performed per the anesthesia team.  A timeout was called to confirm the patient, the operative site, and the planned procedure.  The patient was then prepped and draped in a standard  sterile fashion using Betadine prep.    The incision was opened sharply.  Upon doing so a cloudy type fluid was expressed from the wound.  An abundance of the fluid was captured to be sent off for Gram stain culture and sensitivity.  I then carefully removed all the sutures in the subcutaneous tissue as well as in the deeper adipose tissue.  The IT band had dehisced at its distal aspect.  The sutures were removed from the IT band which was opened up exposing the repair.  The repair it did appear to be intact.  The allograft patch had begun to incorporate.  The allograft patch was removed as I felt this was a nidus for infection.  The surgical repair was explored and intact and therefore the sutures for the repair were left in place.  Any fascia or muscle tissue was debrided sharply with a 15 blade scalpel and a radiofrequency wand.  No bone was removed.  Any necrotic fatty tissue was also excised with a radiofrequency wand.  The wound was then copiously irrigated with approximately 9 L of pulsatile lavage debriding sharply between each 3 L bag.    The outer edge of the skin was then cleaned with a hydroperoxide solution.  Outer gloves were then changed myself and the surgical assistant.  A vacuum-assisted dressing was then placed in a standard fashion and set to 125 mm of continuous suction.  The incision was approximately 20 cm in length and 6 cm in depth down to the level of the greater trochanter.    The patient awoke from anesthesia without difficulty and was transferred the PACU in stable condition.    Plan patient will be allowed to toe-touch weight-bear.  She will receive IV antibiotics per the infectious disease team as we await cultures from the tissue fluid and repeat blood cultures.  We will plan to change the vacuum-assisted dressing every 48 hours.    Nino Carlson M.D.

## 2021-02-09 NOTE — H&P
[facility] Medicine Services  HISTORY AND PHYSICAL    Date of Admission: 2/8/2021  Primary Care Physician: Davon Urrutia MD    Subjective     Chief Complaint: Suspected syncope and collapse    History of Present Illness  Tawny Shin is a 65-year-old female with a vast medical history to include rheumatoid arthritis, coronary artery disease, hypertension, recent labral tear of right hip joint with repair 1/14/2021 per Dr. Carlson, please see below for complete list.  Patient received injection to treat RA today and passed out during procedure.  Her son was in attendance, Dr. Gee hSin -neurologist.  He felt she had a vasovagal episode and took her home. She has incision to the right hip that is draining, he change dressing.  Later in the day her sister stopped by to check on her and found her in the floor, unknown length of time.  She was described as being semiconscious.  Dressing to right hip was again saturated.  EMS was called and patient was transferred to [facility] emergency department.  Work-up has been unremarkable.  She does have slightly evaded CPK and an elevated D-dimer that is both felt to be secondary to fall/unknown length of time down.  Patient currently has no memory of fall or dizziness.  She is complaining of a severe headache.  She states this is new since she arrived to the hospital.  She has mild nausea.  During assessment she drifts off to sleep between questions and during assessment.  Dressing on right hip has been changed, I did not remove at this time.  Culture obtained.  Vital signs are stable patient is admitted for further evaluation treatment.     Review of Systems   A 10 point review of systems was completed, all negative except for those discussed in HPI    Past Medical History:   Past Medical History:   Diagnosis Date   • Arthritis    • Asthma    • Chronic sinusitis    • Coronary artery disease    • Eustachian tube dysfunction     • Heart disease    • Herpes simplex    • Hyperlipidemia    • Hypertension    • Knee dislocation    • Labral tear of right hip joint    • Laryngitis sicca    • Laryngitis, chronic    • MI (myocardial infarction) (CMS/HCC)    • Otorrhea    • Sensorineural hearing loss    • Sjogren's disease (CMS/HCC)        Past Surgical History:   Past Surgical History:   Procedure Laterality Date   • A-V CARDIAC PACEMAKER INSERTION  2016   • ATRIAL CARDIAC PACEMAKER INSERTION     • CARDIAC CATHETERIZATION     • CATARACT EXTRACTION     • COLONOSCOPY  11/08/2011    One fold in the ascending colon which showed ulcer otherwise normal exam   • COLONOSCOPY  11/12/2004    Normal exam repeat in five years   • CORONARY ANGIOPLASTY WITH STENT PLACEMENT      X 2; 2013 & 2014   • ENDOSCOPY  07/10/2014    Normal exam   • HIP ABDUCTION TENOTOMY BILATERAL Right 1/14/2021    Procedure: RIGHT HIP GLUTEUS MEDLUS / MINIMUS REPAIR, POSSIBLE ACHILLES ALLOGRAFT;  Surgeon: Nino Carlson MD;  Location: [facility] OR;  Service: Orthopedics;  Laterality: Right;   • JOINT REPLACEMENT     • LUMBAR FUSION N/A 1/19/2018    Procedure: L3-4,L4-5 DECOMPRESSION, POSTERIOR SPINAL FUSION WITH INSTRUMENTATION;  Surgeon: Fortino Oropeza MD;  Location: [facility] OR;  Service:    • LUMBAR LAMINECTOMY WITH FUSION Left 1/17/2018    Procedure: LEFT L3-4 L4-5 LATERAL LUMBAR INTERBODY FUSION;  Surgeon: Fortino Oropeza MD;  Location: [facility] OR;  Service:    • MYRINGOTOMY W/ TUBES  09/04/2014    TUBES NO LONGER IN PLACE   • OTHER SURGICAL HISTORY      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 so hardware was removed and spacers were placed   • REPLACEMENT TOTAL KNEE Right        Family History: family history includes Cancer in her mother; Heart disease in her father.    Social History:  reports that she has never smoked. She has never used smokeless tobacco. She reports that she does not drink alcohol or use drugs.    Code Status: Full, if unable speak for self her son will speak for  her      Allergies:  Allergies   Allergen Reactions   • Atorvastatin Other (See Comments)     LEG CRAMPS     • Amoxicillin Rash   • Escitalopram Unknown - Low Severity   • Nabumetone Rash   • Niacin Er Rash   • Penicillins Rash   • Simvastatin Rash       Medications:  Prior to Admission medications    Medication Sig Start Date End Date Taking? Authorizing Provider   aspirin 81 MG EC tablet Take 81 mg by mouth Daily.    Leila Daly MD   carvedilol (COREG) 12.5 MG tablet Take 1 tablet by mouth 2 (Two) Times a Day With Meals. 1/29/21   Omar Hernandez MD   Certolizumab Pegol (Cimzia) 2 X 200 MG kit Inject 400 mg under the skin into the appropriate area as directed Every 28 (Twenty-Eight) Days.    Leila Daly MD   cloNIDine (CATAPRES) 0.1 MG tablet TAKE 2 TABLETS BY MOUTH EVERY NIGHT AT BEDTME AND AS NEEDED FOR BLOOD PRESSURE GREATER THAN 160/90 10/22/20   Davon Urrutia MD   clopidogrel (PLAVIX) 75 MG tablet Take 75 mg by mouth Daily. 10/11/20   Leila Daly MD   Evolocumab (REPATHA) solution auto-injector SureClick injection Inject 1 mL under the skin into the appropriate area as directed Every 14 (Fourteen) Days. 2/1/21   Omar Hernandez MD   ezetimibe (ZETIA) 10 MG tablet Take 10 mg by mouth Daily. 8/23/20   Leila Daly MD   fluticasone (FLOVENT DISKUS) 50 MCG/BLIST diskus inhaler Inhale 1 puff 2 (Two) Times a Day.    Leila Daly MD   folic acid (FOLVITE) 1 MG tablet TAKE 1 TABLET BY MOUTH DAILY 5/7/20   Davon Urrutia MD   furosemide (LASIX) 40 MG tablet Take 40 mg by mouth daily.    Leila Daly MD   guaiFENesin (MUCINEX) 600 MG 12 hr tablet Take  by mouth.    Leila Daly MD   isosorbide mononitrate (IMDUR) 60 MG 24 hr tablet Take 1 tablet by mouth 2 (Two) Times a Day. 4/13/20   Omar Hernandez MD   leflunomide (ARAVA) 20 MG tablet Take 20 mg by mouth Daily.    Leila Daly MD   levothyroxine (SYNTHROID, LEVOTHROID) 75 MCG tablet  " 9/3/20   Leila Daly MD   MULTIPLE VITAMIN PO Take 1 tablet by mouth daily.      Leila Daly MD   nitroglycerin (NITROSTAT) 0.4 MG SL tablet Place 1 tablet under the tongue every 5 minutes as needed for Chest pain 3/9/16   Leila Daly MD   pantoprazole (PROTONIX) 40 MG EC tablet TAKE 1 TABLET BY MOUTH DAILY 5/14/20   Davon Urrutia MD   predniSONE (DELTASONE) 10 MG tablet Take 1 tablet by mouth Daily.  Patient taking differently: Take 5 mg by mouth Daily. 8/5/20   Elie Harvey Jr., MD   salsalate (DISALCID) 500 MG tablet TAKE 5 TABLETS ON MONDAY, WEDNESDAY, FRIDAY, AND SATURDAY, AND TAKE 4 TABLETS ON TUESDAYS, THURSDAYS, AND SUNDAYS 11/17/20   Davon Urrutia MD   spironolactone (ALDACTONE) 25 MG tablet TAKE 1 TABLET BY MOUTH DAILY 5/4/20   Davon Urrutia MD   valACYclovir (VALTREX) 500 MG tablet Take 1 tablet by mouth Daily. 10/28/19   Candelaria David MD   vitamin D (ERGOCALCIFEROL) 1.25 MG (27963 UT) capsule capsule Take 1 capsule by mouth Every 7 (Seven) Days. 12/14/20   Davon Urrutia MD   Ultram, 50 mg twice a day    Objective     /64   Pulse 91   Temp 99.5 °F (37.5 °C) (Oral)   Resp 16   Ht 170.2 cm (67\")   Wt 96.2 kg (212 lb)   SpO2 95%   BMI 33.20 kg/m²   Physical Exam  Vitals signs reviewed.   Constitutional:       Appearance: She is obese. She is ill-appearing.      Comments: Easily awakens for assessment however drifts off between questions   HENT:      Head: Normocephalic and atraumatic.      Mouth/Throat:      Mouth: Mucous membranes are dry.      Pharynx: Oropharynx is clear.   Eyes:      Extraocular Movements: Extraocular movements intact.      Pupils: Pupils are equal, round, and reactive to light.   Neck:      Musculoskeletal: Normal range of motion and neck supple.   Cardiovascular:      Rate and Rhythm: Normal rate and regular rhythm.      Pulses: Normal pulses.   Pulmonary:      Effort: Pulmonary effort is normal.      Breath sounds: " Normal breath sounds.   Abdominal:      General: Bowel sounds are normal.      Palpations: Abdomen is soft.      Comments: Protuberant   Musculoskeletal:      Right lower leg: No edema.      Left lower leg: No edema.      Comments: Generalized weakness and debility   Skin:     General: Skin is warm and dry.   Neurological:      General: No focal deficit present.      Mental Status: She is oriented to person, place, and time.      Comments: Drifts off to sleep easily between question   Psychiatric:         Mood and Affect: Mood normal.         Behavior: Behavior normal.       Pertinent Data:   Lab Results (last 72 hours)     Procedure Component Value Units Date/Time    Urinalysis With Culture If Indicated - Urine, Catheter [[id number]]  (Abnormal) Collected: 02/08/21 2058    Specimen: Urine, Catheter Updated: 02/08/21 2125     Color, UA Yellow     Appearance, UA Clear     pH, UA 5.5     Specific Gravity, UA 1.020     Glucose, UA Negative     Ketones, UA 15 mg/dL (1+)     Bilirubin, UA Negative     Blood, UA Negative     Protein, UA Trace     Leuk Esterase, UA Negative     Nitrite, UA Negative     Urobilinogen, UA 0.2 E.U./dL    Narrative:      Urine microscopic not indicated.    COVID-19, [facility]BBOTT IN-HOUSE,NASAL Swab (NO TRANSPORT MEDIA) 2 HR TAT - Swab, Nasopharynx [[id number]]  (Normal) Collected: 02/08/21 1945    Specimen: Swab from Nasopharynx Updated: 02/08/21 2038     COVID19 Presumptive Negative    Blood Culture - Blood, Wrist, Left [[id number]] Collected: 02/08/21 1946    Specimen: Blood from Wrist, Left Updated: 02/08/21 2036    D-dimer, Quantitative [[id number]]  (Abnormal) Collected: 02/08/21 1936    Specimen: Blood from Arm, Right Updated: 02/08/21 2033     D-Dimer, Quantitative 13.53 mg/L (FEU)     CK [[id number]]  (Abnormal) Collected: 02/08/21 1936    Specimen: Blood from Arm, Right Updated: 02/08/21 2025     Creatine Kinase 324 U/L     Wound Culture - Swab, Hip, Right [[id number]] Collected: 02/08/21 2018     Specimen: Swab from Hip, Right Updated: 02/08/21 2023    Procalcitonin [[id number]]  (Abnormal) Collected: 02/08/21 1936    Specimen: Blood from Arm, Right Updated: 02/08/21 2012     Procalcitonin 0.86 ng/mL     Comprehensive Metabolic Panel [[id number]]  (Abnormal) Collected: 02/08/21 1936    Specimen: Blood from Arm, Right Updated: 02/08/21 2006     Glucose 114 mg/dL      BUN 17 mg/dL      Creatinine 0.81 mg/dL      Sodium 136 mmol/L      Potassium 4.0 mmol/L      Chloride 98 mmol/L      CO2 28.0 mmol/L      Calcium 9.3 mg/dL      Total Protein 6.7 g/dL      Albumin 3.60 g/dL      ALT (SGPT) 13 U/L      AST (SGOT) 25 U/L      Alkaline Phosphatase 91 U/L      Total Bilirubin 0.7 mg/dL      eGFR Non African Amer 71 mL/min/1.73      Globulin 3.1 gm/dL      A/G Ratio 1.2 g/dL      BUN/Creatinine Ratio 21.0     Anion Gap 10.0 mmol/L     Lactic Acid, Plasma [[id number]]  (Normal) Collected: 02/08/21 1936    Specimen: Blood from Arm, Right Updated: 02/08/21 2000     Lactate 1.2 mmol/L     CBC Auto Differential [[id number]]  (Abnormal) Collected: 02/08/21 1936    Specimen: Blood from Arm, Right Updated: 02/08/21 1947     WBC 11.33 10*3/mm3      RBC 4.16 10*6/mm3      Hemoglobin 12.4 g/dL      Hematocrit 37.1 %      MCV 89.2 fL      MCH 29.8 pg      MCHC 33.4 g/dL      RDW 15.5 %      RDW-SD 50.1 fl      MPV 10.6 fL      Platelets 191 10*3/mm3      Neutrophil % 75.8 %      Lymphocyte % 12.6 %      Monocyte % 10.4 %      Eosinophil % 0.3 %      Basophil % 0.4 %      Immature Grans % 0.5 %      Neutrophils, Absolute 8.58 10*3/mm3      Lymphocytes, Absolute 1.43 10*3/mm3      Monocytes, Absolute 1.18 10*3/mm3      Eosinophils, Absolute 0.03 10*3/mm3      Basophils, Absolute 0.05 10*3/mm3      Immature Grans, Absolute 0.06 10*3/mm3      nRBC 0.0 /100 WBC     Blood Culture - Blood, Arm, Right [[id number]] Collected: 02/08/21 1936    Specimen: Blood from Arm, Right Updated: 02/08/21 1944        Imaging Results (Last 24 Hours)      Procedure Component Value Units Date/Time    XR Hip With or Without Pelvis 2 - 3 View Right [[id number]] Collected: 02/08/21 2254     Updated: 02/08/21 2258    Narrative:      EXAMINATION:  XR HIP W OR WO PELVIS 2-3 VIEW RIGHT-  2/8/2021 8:25 PM  CST     HISTORY: drainage from incision, recent repair      COMPARISON: 10/22/2020 right hip radiographs     TECHNIQUE: 3 views: AP pelvis, AP and lateral right hip     FINDINGS:      The right femoral head is imaged appropriately within the acetabulum.  Contracted the femoral head is maintained without fracture.     The left hip joint is maintained.     The bony pelvis is intact without fracture.     Degenerative changes noted in the lower lumbar spine.       Impression:      1. No acute osseous abnormality.  This report was finalized on 02/08/2021 22:55 by Dr. Sterling Elizalde MD.    CT Lumbar Spine Without Contrast [[id number]] Collected: 02/08/21 2226     Updated: 02/08/21 2239    Narrative:      EXAMINATION:  CT LUMBAR SPINE WO CONTRAST-  2/8/2021 9:14 PM CST     HISTORY: ams, unwitnessed fall, pain      COMPARISON: Lumbar spine radiographs dated 11/7/2019     TECHNIQUE: Radiation dose equals  mGy-cm.  Automated exposure  control dose reduction technique was implemented.     Thin section axial imaging was obtained without intravenous contrast.  2-D sagittal and coronal reconstruction images were generated.     FINDINGS:      Extensive postsurgical changes from posterior and interbody fusion  identified at L3-L4 and L4-L5. The hardware is imaged appropriately  without screw fracture or hardware complications.     Large laminectomy defect identified at L4.     There is no CT evidence of acute fracture or subluxation.     There is disc degeneration at L2-L3 and L1-L2 with disc space narrowing  and vacuum phenomenon, endplate sclerosis and osteophyte formation.     There is broad-based partially calcified disc at L1-L2 with resulting  canal stenosis. There is facet  arthropathy with bilateral foraminal  stenosis particularly on the right     There is mild posterior listhesis of L2 on L3 with posterior osteophyte  and disc complex with relative canal and foraminal narrowing.       Impression:      1. No CT evidence of acute bony injury to the lumbar spine.  2. Extensive postsurgical changes.  3. Advanced disc degeneration and spondylosis at L2-L3 and L1-L2.  This report was finalized on 02/08/2021 22:36 by Dr. Sterling Elizalde MD.    CT Thoracic Spine Without Contrast [[id number]] Collected: 02/08/21 2224     Updated: 02/08/21 2229    Narrative:      EXAMINATION:  CT THORACIC SPINE WO CONTRAST-  2/8/2021 9:14 PM CST     HISTORY: ams, unwitnessed fall      COMPARISON: No comparison study.     TECHNIQUE: Radiation dose equals DLP 1/2/2002 mGy-cm.  Automated  exposure control dose reduction technique was implemented.     Thin section axial imaging was obtained without intravenous contrast.  2-D sagittal and coronal reconstruction images were generated.     FINDINGS: There is osteoporosis.     There are diffuse spondylosis changes. Bridging anterior osteophyte  formation observed. There is mild endplate irregularities with Schmorl's  node changes in the mid to lower thoracic spine.     There is no CT evidence of acute fracture or subluxation.     There is no significant canal stenosis. There is no CT evidence of  posttraumatic disc herniation.       Impression:      1. No CT evidence of acute bony injury to the thoracic spine.  This report was finalized on 02/08/2021 22:26 by Dr. Sterling Elizalde MD.    XR Chest 1 View [[id number]] Collected: 02/08/21 2202     Updated: 02/08/21 2206    Narrative:      EXAMINATION: XR CHEST 1 VW-. 2/8/2021 10:02 PM CST     CHEST, ONE VIEW:     HISTORY: Altered mental status     COMPARISON: 7/7/2014 chest radiography     A single frontal chest radiograph was obtained.     FINDINGS:     Permanent cardiac pacemaker identified.     Calcified granuloma noted in  the right upper lobe.     Mild chronic interstitial prominence identified without parenchymal  infiltrates.     There are no pleural effusions or pneumothoraces.     The heart is normal in size without evidence of overt heart failure.     No acute osseous abnormalities identified.                                     Impression:      1. No acute cardiopulmonary process.     This report was finalized on 02/08/2021 22:03 by Dr. Sterling Elizalde MD.    CT Angiogram Chest [[id number]] Collected: 02/08/21 2153     Updated: 02/08/21 2204    Narrative:      EXAMINATION: CT ANGIOGRAM CHEST-  2/8/2021 9:53 PM CST     HISTORY:Chest pain post fall     COMPARISON: Current chest radiography     TECHNIQUE:     CT evaluation of the chest was performed with intravenous contrast. 2 mm  transaxial images were obtained.. Coronal reconstruction maximum  intensity projection images of the pulmonary arteries and aorta were  also generated.     Radiation dose equals  mGy-cm.  Automated exposure control dose  reduction technique was implemented.        FINDINGS:     [The pulmonary arteries opacify with iodinated contrast without  intraluminal filling defects. There is no CT angiographic evidence of  pulmonary embolus.     There is no aortic dissection or aneurysm.     There are no pneumothoraces or pleural effusions.     There are no areas of airspace consolidation.     Calcified granuloma noted in the right upper lobe.     There is mild reticular groundglass opacities appreciated in the right  upper lobe anteriorly, nonspecific. Acute infectious or inflammatory  change considered. Some mild pulmonary contusion would be difficult to  exclude, however less likely.     There is mild ground glass opacities posteriorly in the lower lobes  likely representing atelectasis associated with the level of  inspiration.     There is no mediastinal or hilar lymphadenopathy. There are calcified  right hilar lymph nodes.     Coronary artery  calcifications observed. Permanent cardiac pacemaker  leads identified.     Limited imaging the upper abdomen demonstrate no acute abnormality.     There is mild deformity of the right fourth rib anteriorly, some old  trauma questioned. Acute bony injury difficult to totally exclude,  however no discrete fracture line observed. Correlate with clinical  findings.     Spondylosis changes observed in the thoracic spine. No acute spine  fracture observed.]       Impression:      1. No CT angiographic evidence of pulmonary embolus or acute aortic  pathology.  2. Mild reticular groundglass reticular opacities in the right upper  lobe, etiology uncertain. Differential considerations includes mild  infectious/inflammatory changes. Mild pulmonary contusion as well as  chronic interstitial lung changes considered. Probable mild atelectasis  in the lower lobes. Lung fields are otherwise clear.  3. Mild deformity right anterior fourth rib, question old trauma.  Correlate with pedicle findings. No definite acute osseous abnormalities  observed otherwise.  This report was finalized on 02/08/2021 22:01 by Dr. Sterling Elizalde MD.    CT Cervical Spine Without Contrast [[id number]] Collected: 02/08/21 2145     Updated: 02/08/21 2153    Narrative:      EXAMINATION:  CT CERVICAL SPINE WO CONTRAST-  2/8/2021 9:14 PM CST     HISTORY: ams, unwitnessed fall      COMPARISON: No comparison study.     TECHNIQUE: Radiation dose equals  mGy-cm.  Automated exposure  control dose reduction technique was implemented.     Thin section axial imaging was obtained without intravenous contrast.  2-D sagittal and coronal reconstruction images were generated.     FINDINGS:      The facets are appropriately aligned.     The tibial body heights are maintained. The prevertebral soft tissues  are normal.     There is no CT evidence of acute fracture or subluxation.     There is advanced disc degeneration spondylosis C5-C6 and C6-C7 with  complete loss of  disc space, 8 end plate sclerosis and anterior  posterior osteophyte formation.     There is high-grade canal stenosis associated with right paracentral  osteophyte at C5-C6 with relative canal stenosis at C6-C7 left  paracentrally due to osteophyte formation.     There is moderate bilateral foraminal stenosis at these levels  particularly at C5-C6.     There are some degenerative changes at C7-T1 with anterior osteophyte  formation.     The remaining disc spaces are maintained without significant canal  foraminal stenosis.     There is no CT evidence of posttraumatic disc herniation.     There are carotid artery calcifications.     The upper lung fields are clear.     Sphenoid sinus ethmoid sinus opacification and spurring noted.       Impression:      1. No CT evidence of acute bony injury to the cervical spine.  2. Advanced disc degeneration and spondylosis C5-C6 and to a lesser  degree C6-C7 with resulting canal and foraminal stenosis.  This report was finalized on 02/08/2021 21:50 by Dr. Sterling Elizalde MD.    CT Head Without Contrast [[id number]] Collected: 02/08/21 2136     Updated: 02/08/21 2151    Narrative:      EXAMINATION: CT HEAD WO CONTRAST-  2/8/2021 9:36 PM CST     CT SCAN OF THE HEAD, WITHOUT CONTRAST:      HISTORY: Altered mental status, fall     COMPARISONS: 11/20/2014 head CT      TECHNIQUE:     Radiation dose equals  mGy-cm.  Automated exposure control dose  reduction technique was implemented.        CT evaluation of the head without intravenous contrast. 5 mm transaxial  images were obtained.   2-D sagittal and coronal reconstruction images  were generated.     FINDINGS:      There is no intra or extra-axial hemorrhage.     There is no mass, mass effect or midline shift.     There is no hydrocephalus     There is opacification of the sphenoid ethmoid sinuses.     There is no skull fracture or acute calvarial abnormality.             Impression:      1. No CT evidence of an acute  intracranial process.  2. Extensive paranasal sinus disease.           This report was finalized on 02/08/2021 21:38 by Dr. Sterling Elizalde MD.            I have personally reviewed and interpreted the radiology studies and ECG obtained at time of admission.     Assessment / Plan     Assessment:   Active Hospital Problems    Diagnosis   • Syncope   • Leukocytosis   • Headache   • Elevated CPK   Right hip incision draining    Plan:   1.  Admit as observation  2.  Follow syncope protocol  3.  Home medications reviewed and restarted as appropriate  4.  DVT prophylaxis with SCDs  5.  MRI of the brain in a.m.  6.  Orthostatic blood pressures every shift, cardiac monitoring  7.  Incentive spirometry, supplemental oxygen as needed, turn cough deep breathe  8.  Consult PT/OT  9.  Consider orthopedic consultation to evaluate incision, await wound culture  10.  Will hold on antibiotics for now, patient received vancomycin and Azactam in the emergency department    I discussed the patient's findings and my recommendations with: Senthil Paul MD  Time spent: 35 minutes    Patient seen and examined by me on 2/8/221 at 10:53 PM.    Electronically signed by DANIELA Miranda, 02/08/21, 23:51 CST.    I personally evaluated and examined the patient in conjunction with DANIELA Myles and agree with the assessment, treatment plan, and disposition of the patient as recorded by her. My history, exam, and further recommendations are:     Patient evaluated at time admission seen in her room.  Agree assessment plan exam as above.  Patient had a syncopal episode x2.  Uncertain as to the exact etiology although I am concerned that she is running a low-grade fever and has been having chills recently.  Her wound is malodorous and having significant serosanguineous drainage.  Her white count is above baseline unfortunately she is on several immunosuppressant medications making it difficult to know if she has an infection or not but  I am concerned.  Antibiotics of been initiated culture blood and wound.  She has gram-negative's on the Gram stain will check CT scan of her thigh to evaluate for evidence of an abscess and consult orthopedic surgery.  Agree with checking orthostatics as well.  Also check inflammatory markers.  I believe antibiotics need to be continued for the moment given the fact she is febrile and as stated immunosuppressed.  Electronically signed by Senthil Paul MD, 2/9/2021, 05:09 CST.

## 2021-02-09 NOTE — TELEPHONE ENCOUNTER
Patient is currently in patient at [facility] and needs MRI.  Cardiology Order Form completed and signed by DANIELA Jernigan.  Completed form faxed to radiology.

## 2021-02-09 NOTE — ANESTHESIA PROCEDURE NOTES
Airway  Urgency: emergent    Date/Time: 2/9/2021 4:56 PM  Airway not difficult    General Information and Staff    Patient location during procedure: OR  CRNA: Moises Chavarria CRNA    Consent for Airway (if performed for an anesthetic, see related documentation for consents)  Patient identity confirmed: verbally with patient  Consent: No emergent situation. Verbal consent obtained.  Consent given by: patient      Indications and Patient Condition  Indications for airway management: airway protection    Preoxygenated: yes  Mask difficulty assessment: 1 - vent by mask    Final Airway Details  Final airway type: endotracheal airway      Successful airway: ETT    Successful intubation technique: direct laryngoscopy  Endotracheal tube insertion site: oral  Blade: Nikko  Blade size: 3.5.  ETT size (mm): 7.5  Cormack-Lehane Classification: grade IIa - partial view of glottis  Placement verified by: chest auscultation and capnometry   Measured from: lips  Number of attempts at approach: 1  Assessment: lips, teeth, and gum same as pre-op and atraumatic intubation

## 2021-02-09 NOTE — NURSING NOTE
Bedside neuro and NIH at bedside completed with SARAH Hernandez. Changes noted through shift, NIH of 2 and only bc she is unable to do much with RLE d/t pain.

## 2021-02-09 NOTE — ANESTHESIA PREPROCEDURE EVALUATION
Anesthesia Evaluation     Patient summary reviewed   no history of anesthetic complications:  NPO Solid Status: > 8 hours  NPO Liquid Status: > 8 hours           Airway   Mallampati: I  TM distance: >3 FB  Neck ROM: full  No difficulty expected  Dental - normal exam     Pulmonary - normal exam   (+) asthma,  Cardiovascular - normal exam  Exercise tolerance: poor (<4 METS)    (+) hypertension well controlled 2 medications or greater, past MI  >12 months, CAD, hyperlipidemia,       Neuro/Psych  (+) headaches, syncope, numbness,     GI/Hepatic/Renal/Endo    (+) obesity, morbid obesity,  thyroid problem hypothyroidism    Musculoskeletal     (+) gait problem,   Abdominal  - normal exam   Substance History - negative use     OB/GYN          Other   arthritis,                      Anesthesia Plan    ASA 3 - emergent     general     intravenous induction     Anesthetic plan, all risks, benefits, and alternatives have been provided, discussed and informed consent has been obtained with: patient.  Use of blood products discussed with patient .   Plan discussed with CRNA.

## 2021-02-09 NOTE — CONSULTS
"Pharmacy Dosing Service  Pharmacokinetics  Vancomycin Initial Evaluation  Assessment/Action/Plan:  Loading dose: 2,000 mg in ER  Current Order: Vancomycin 1,000 mg IVPB every 12 hours  Current end date/final dose: 2/15/21 at 2000  Levels: None ordered at this time  Additional antimicrobial agent(s): Azactam    Vancomycin dosage initiated based on population pharmacokinetic parameters. Pharmacy will continue to follow daily and adjust dose accordingly.     Subjective:  Tawny Shin is a 65 y.o. female with a Vancomycin \"Pharmacy to Dose\" consult for the treatment of SSTI (drainage of right hip incision) , day 2 of treatment.    AUC Model Data:  Regimen: 1000 mg every 12 hours   Start time: 08:00 on 02/09/2021  Exposure target: AUC24 (range)400-600 mg/L.hr  AUC24,ss: 432 mg/L.hr  PAUC*: 58 %  Ctrough,ss: 13.6 mg/L  Pconc*: 20 %  Tox.: 9 %    Objective:  Ht: 170.2 cm (67\"); Wt: 95.1 kg (209 lb 9.6 oz)  Estimated Creatinine Clearance: 80.8 mL/min (by C-G formula based on SCr of 0.66 mg/dL).   Creatinine   Date Value Ref Range Status   02/09/2021 0.66 0.57 - 1.00 mg/dL Final   02/08/2021 0.81 0.57 - 1.00 mg/dL Final      Lab Results   Component Value Date    WBC 10.50 02/09/2021    WBC 11.33 (H) 02/08/2021      Baseline culture results:  Microbiology Results (last 10 days)       Procedure Component Value - Date/Time    Wound Culture - Swab, Hip, Right [[id number]] Collected: 02/08/21 2018    Lab Status: Preliminary result Specimen: Swab from Hip, Right Updated: 02/09/21 0300     Gram Stain Rare (1+) WBCs seen      Rare (1+) Gram negative bacilli    COVID-19, [facility] IN-HOUSE,NASAL Swab (NO TRANSPORT MEDIA) 2 HR TAT - Swab, Nasopharynx [[id number]]  (Normal) Collected: 02/08/21 1945    Lab Status: Final result Specimen: Swab from Nasopharynx Updated: 02/08/21 2038     COVID19 Presumptive Negative    Narrative:      Fact sheet for providers: https://www.fda.gov/media/490708/download     Fact sheet for patients: " https://www.fda.gov/media/650061/download    Test performed by PCR.  If inconsistent with clinical signs and symptoms patient should be tested with different authorized molecular test.            Segundo Biggs, PharmD  02/09/21 06:40 CST

## 2021-02-09 NOTE — PLAN OF CARE
Goal Outcome Evaluation:  Plan of Care Reviewed With: patient  Progress: improving  Outcome Summary: VSS, slight c/o tolerable pain. Neuro has improved throughout shift, she is no longer slow to answer or confused, /push/pulls improving as well. Low grade temp managed with PO tylenol. Wound to right hip does have radiating pain down leg, malodorous, serosang and tannish exudate from prev sx site-put foam dressing on for time being. Safety maintained, education completed and will continue to monitor.

## 2021-02-09 NOTE — THERAPY EVALUATION
Patient Name: Tawny Shin  : 1955    MRN: [id number]                              Today's Date: 2021       Admit Date: 2021    Visit Dx:     ICD-10-CM ICD-9-CM   1. Altered mental status, unspecified altered mental status type  R41.82 780.97   2. Syncope, unspecified syncope type  R55 780.2   3. Impaired mobility  Z74.09 799.89     Patient Active Problem List   Diagnosis   • Eustachian tube dysfunction   • Sensorineural hearing loss   • Lumbago of multiple sites in spine with sciatica   • Spondylolisthesis of lumbar region   • Coronary artery disease   • Hyperlipidemia   • Hypertension   • Myalgia due to statin   • S/P coronary artery stent placement   • Pacemaker   • Seropositive rheumatoid arthritis of multiple sites (CMS/HCC)   • Sick sinus syndrome (CMS/HCC)   • Other osteoporosis without current pathological fracture   • Allergic-infective asthma   • Arthritis of both knees   • Vasovagal syncope   • Bilateral bunions   • Bronchitis   • Cardiac pacemaker syndrome   • Charcot's joint of foot, left   • Constipation   • 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   • Intrinsic asthma   • Left carotid bruit   • Neutropenia (CMS/HCC)   • Obesity   • Primary osteoarthritis of left knee   • Psoriasis vulgaris   • Recurrent acute serous otitis media of both ears   • S/P lumbar laminectomy   • Thrombocytopenia (CMS/HCC)   • Hypothyroidism   • Vitamin D deficiency   • Myxedema heart disease   • Spondylolisthesis of lumbar region   • Syncope, cardiogenic   • Rupture of gluteus minimus tendon   • Muscle tear   • Syncope   • Leukocytosis   • Headache   • Elevated CPK     Past Medical History:   Diagnosis Date   • Arthritis    • Asthma    • Chronic sinusitis    • Coronary artery disease    • Eustachian tube dysfunction    • Heart disease    • Herpes simplex    • Hyperlipidemia    • Hypertension    • Knee dislocation    • Labral tear of right hip joint    • Laryngitis sicca    • Laryngitis, chronic    • MI (myocardial  infarction) (CMS/HCC)    • Otorrhea    • Sensorineural hearing loss    • Sjogren's disease (CMS/HCC)      Past Surgical History:   Procedure Laterality Date   • A-V CARDIAC PACEMAKER INSERTION  2016   • ATRIAL CARDIAC PACEMAKER INSERTION     • CARDIAC CATHETERIZATION     • CATARACT EXTRACTION     • COLONOSCOPY  11/08/2011    One fold in the ascending colon which showed ulcer otherwise normal exam   • COLONOSCOPY  11/12/2004    Normal exam repeat in five years   • CORONARY ANGIOPLASTY WITH STENT PLACEMENT      X 2; 2013 & 2014   • ENDOSCOPY  07/10/2014    Normal exam   • HIP ABDUCTION TENOTOMY BILATERAL Right 1/14/2021    Procedure: RIGHT HIP GLUTEUS MEDLUS / MINIMUS REPAIR, POSSIBLE ACHILLES ALLOGRAFT;  Surgeon: Nino Carlson MD;  Location:  [facility] OR;  Service: Orthopedics;  Laterality: Right;   • JOINT REPLACEMENT     • LUMBAR FUSION N/A 1/19/2018    Procedure: L3-4,L4-5 DECOMPRESSION, POSTERIOR SPINAL FUSION WITH INSTRUMENTATION;  Surgeon: Fortino Oropeza MD;  Location:  [facility] OR;  Service:    • LUMBAR LAMINECTOMY WITH FUSION Left 1/17/2018    Procedure: LEFT L3-4 L4-5 LATERAL LUMBAR INTERBODY FUSION;  Surgeon: Fortino Oropeza MD;  Location:  [facility] OR;  Service:    • MYRINGOTOMY W/ TUBES  09/04/2014    TUBES NO LONGER IN PLACE   • OTHER SURGICAL HISTORY      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 so hardware was removed and spacers were placed   • REPLACEMENT TOTAL KNEE Right      General Information     Row Name 02/09/21 0907          Physical Therapy Time and Intention    Document Type  evaluation Dx. syncope; PMH RA, COPD, CAD, CKD; right hip glut med/min repair 1/14/21; received injection for RA and had syncopal episode  -MS (r) AB (t) MS (c)     Mode of Treatment  physical therapy  -MS (r) AB (t) MS (c)     Row Name 02/09/21 0967          General Information    Patient Profile Reviewed  yes  -MS (r) AB (t) MS (c)     Prior Level of Function  independent:;all household mobility;community  mobility;gait;transfer;bed mobility;ADL's ambulated with FWW post R hip glut med/min repair; shower chair, grab bars  -MS (r) AB (t) MS (c)     Existing Precautions/Restrictions  fall;hip, posterior;weight bearing  (Pended)  PWB RLE (50% per pt.)  -AB     Barriers to Rehab  previous functional deficit R hip glut med/min repair 1/14/21  -MS (r) AB (t) MS (c)     Row Name 02/09/21 0907          Living Environment    Lives With  alone  -MS (r) AB (t) MS (c)     Row Name 02/09/21 0907          Home Main Entrance    Number of Stairs, Main Entrance  one  -MS (r) AB (t) MS (c)     Stair Railings, Main Entrance  none  -MS (r) AB (t) MS (c)     Row Name 02/09/21 0907          Stairs Within Home, Primary    Number of Stairs, Within Home, Primary  none  -MS (r) AB (t) MS (c)     Stair Railings, Within Home, Primary  none  -MS (r) AB (t) MS (c)     Row Name 02/09/21 0907          Cognition    Orientation Status (Cognition)  oriented x 4  -MS (r) AB (t) MS (c)     Row Name 02/09/21 0907          Safety Issues, Functional Mobility    Impairments Affecting Function (Mobility)  endurance/activity tolerance;pain;range of motion (ROM);strength  -MS (r) AB (t) MS (c)       User Key  (r) = Recorded By, (t) = Taken By, (c) = Cosigned By    Initials Name Provider Type    Nicole Palacios R, PT, DPT, NCS Physical Therapist    AB Familia Ambriz, PT Student PT Student        Mobility     Row Name 02/09/21 0907          Bed Mobility    Bed Mobility  rolling left;scooting/bridging;supine-sit  -MS (r) AB (t) MS (c)     Rolling Left [place] (Bed Mobility)  supervision  -MS (r) AB (t) MS (c)     Scooting/Bridging [place] (Bed Mobility)  supervision  -MS (r) AB (t) MS (c)     Supine-Sit [place] (Bed Mobility)  standby assist  -MS (r) AB (t) MS (c)     Assistive Device (Bed Mobility)  bed rails;head of bed elevated  -MS (r) AB (t) MS (c)     Row Name 02/09/21 0907          Bed-Chair Transfer    Bed-Chair [place] (Transfers)   not tested  -MS (r) AB (t) MS (c)     Row Name 02/09/21 0907          Sit-Stand Transfer    Sit-Stand [place] (Transfers)  contact guard;verbal cues;1 person assist  -MS (r) AB (t) MS (c)     Assistive Device (Sit-Stand Transfers)  walker, front-wheeled  -MS (r) AB (t) MS (c)     Row Name 02/09/21 0907          Gait/Stairs (Locomotion)    [place] Level (Gait)  contact guard;verbal cues  -MS (r) AB (t) MS (c)     Assistive Device (Gait)  walker, front-wheeled  -MS (r) AB (t) MS (c)     Distance in Feet (Gait)  40  -MS (r) AB (t) MS (c)     Deviations/Abnormal Patterns (Gait)  gait speed decreased;rush decreased;stride length decreased;antalgic  -MS (r) AB (t) MS (c)     [place] Level (Stairs)  not tested  -MS (r) AB (t) MS (c)     Comment (Gait/Stairs)  step to gait pattern leading with RLE, decreased step length primarily on L LE  -MS (r) AB (t) MS (c)     Row Name 02/09/21 0907          Mobility    Extremity Weight-bearing Status  right lower extremity  -MS (r) AB (t) MS (c)     Right Lower Extremity (Weight-bearing Status)  partial weight-bearing (PWB)  -MS (r) AB (t) MS (c)       User Key  (r) = Recorded By, (t) = Taken By, (c) = Cosigned By    Initials Name Provider Type    Nicole Palacios R, PT, DPT, NCS Physical Therapist    AB Familia Ambriz, PT Student PT Student        Obj/Interventions     Row Name 02/09/21 0907          Range of Motion Comprehensive    Comment, General Range of Motion  L LE ROM WFL; RLE hip flexion, knee extension limited to 25%  -MS (r) AB (t) MS (c)     Row Name 02/09/21 0907          Strength Comprehensive (MMT)    Comment, General Manual Muscle Testing (MMT) Assessment  LLE MMT WFL; RLE hip flexion, knee extension MMT 2/5; RLE knee flexion 3+/5; RLE ankle PF/DF WFL  -MS (r) AB (t) MS (c)     Row Name 02/09/21 0907          Motor Skills    Motor Skills  coordination  -MS (r) AB (t) MS (c)     Coordination  WFL;bilateral;lower extremity  -MS (r) AB (t) MS (c)     [place]  Name 02/09/21 0907          Balance    Balance Assessment  sitting static balance;sitting dynamic balance;standing static balance  -MS (r) AB (t) MS (c)     Static Sitting Balance  WFL;unsupported;sitting, edge of bed  -MS (r) AB (t) MS (c)     Dynamic Sitting Balance  WFL;unsupported;sitting, edge of bed  -MS (r) AB (t) MS (c)     Static Standing Balance  WFL;supported;standing  -MS (r) AB (t) MS (c)     Comment, Balance  unsupported standing balance not assessed in order to maintain PWB on RLE  -MS (r) AB (t) MS (c)     Row Name 02/09/21 0907          Sensory Assessment (Somatosensory)    Sensory Assessment (Somatosensory)  sensation intact self report of numbness/tingling in B feet  -MS (r) AB (t) MS (c)       User Key  (r) = Recorded By, (t) = Taken By, (c) = Cosigned By    Initials Name Provider Type    Nicole Palacios, PT, DPT, NCS Physical Therapist    AB Familia Ambriz, PT Student PT Student        Goals/Plan     Row Name 02/09/21 0907          Bed Mobility Goal 1 (PT)    Activity/Assistive Device (Bed Mobility Goal 1, PT)  bed mobility activities, all  -MS (r) AB (t) MS (c)     [place] Level/Cues Needed (Bed Mobility Goal 1, PT)  independent  -MS (r) AB (t) MS (c)     Time Frame (Bed Mobility Goal 1, PT)  long term goal (LTG);10 days  -MS (r) AB (t) MS (c)     Progress/Outcomes (Bed Mobility Goal 1, PT)  goal ongoing  -MS (r) AB (t) MS (c)     Row Name 02/09/21 0907          Transfer Goal 1 (PT)    Activity/Assistive Device (Transfer Goal 1, PT)  transfers, all  -MS (r) AB (t) MS (c)     [place] Level/Cues Needed (Transfer Goal 1, PT)  supervision required  -MS (r) AB (t) MS (c)     Time Frame (Transfer Goal 1, PT)  long term goal (LTG);10 days  -MS (r) AB (t) MS (c)     Progress/Outcome (Transfer Goal 1, PT)  goal ongoing  -MS (r) AB (t) MS (c)     Row Name 02/09/21 0907          Gait Training Goal 1 (PT)    Activity/Assistive Device (Gait Training Goal 1, PT)  gait (walking locomotion)  -MS  (r) AB (t) MS (c)     [place] Level (Gait Training Goal 1, PT)  modified independence  -MS (r) AB (t) MS (c)     Distance (Gait Training Goal 1, PT)  100  -MS (r) AB (t) MS (c)     Time Frame (Gait Training Goal 1, PT)  long term goal (LTG);10 days  -MS (r) AB (t) MS (c)     Progress/Outcome (Gait Training Goal 1, PT)  goal ongoing  -MS (r) AB (t) MS (c)     Row Name 02/09/21 0907          Stairs Goal 1 (PT)    Activity/Assistive Device (Stairs Goal 1, PT)  stairs, all skills  -MS (r) AB (t) MS (c)     [place] Level/Cues Needed (Stairs Goal 1, PT)  modified independence  -MS (r) AB (t) MS (c)     Number of Stairs (Stairs Goal 1, PT)  1  -MS (r) AB (t) MS (c)     Time Frame (Stairs Goal 1, PT)  long term goal (LTG);10 days  -MS (r) AB (t) MS (c)     Progress/Outcome (Stairs Goal 1, PT)  goal ongoing  -MS (r) AB (t) MS (c)       User Key  (r) = Recorded By, (t) = Taken By, (c) = Cosigned By    Initials Name Provider Type    Nicole Palacios R, PT, DPT, NCS Physical Therapist    AB Familia Ambriz, PT Student PT Student        Clinical Impression     Row Name 02/09/21 0907          Pain    Additional Documentation  Pain Scale: Numbers Pre/Post-Treatment (Group)  -MS (r) AB (t) MS (c)     Row Name 02/09/21 0907          Pain Scale: Numbers Pre/Post-Treatment    Pretreatment Pain Rating  3/10  -MS (r) AB (t) MS (c)     Posttreatment Pain Rating  3/10  -MS (r) AB (t) MS (c)     Pain Location - Side  Right  -MS (r) AB (t) MS (c)     Pain Location - Orientation  incisional  -MS (r) AB (t) MS (c)     Pain Location  hip  -MS (r) AB (t) MS (c)     Pain Intervention(s)  Repositioned;Ambulation/increased activity  -MS (r) AB (t) MS (c)     Row Name 02/09/21 0907          Plan of Care Review    Plan of Care Reviewed With  patient  -MS (r) AB (t) MS (c)     Progress  no change  -MS (r) AB (t) MS (c)     Outcome Summary  PT eval is complete. Pt O&Ax4 and cooperative with PT. Pt had been up with nsg prior to PT arrival. Pt  reported she walked to bathroom and back to bed with no FWW. PT educated pt to use FWW at all times when walking in order to maintain PWB on her RLE to protect her surgical site. Pt was able to verbalize and maintain her hip precuations throughout mobility assessment. ROM and strength deficits noted in RLE with more significant deficit in hip flexion and knee extension. LLE ROM and MMT WFL. Pt able to ambulate 40 feet with CGA and FWW. Bed mobility performed with supervision assist. Supine-sit transfer SBA. Sit-stand from bed and toilet with CGA indicating functional LE strength WFL. Pt offered no complaints of dizziness or lightheadedness throughout PT eval. Pt to benefit from PT services during this hospital stay to improve RLE strength, ROM, and functional mobility. PT recommends discharge to home with assist at this time.  -MS (r) AB (t) MS (c)     Row Name 02/09/21 0907          Therapy Assessment/Plan (PT)    Patient/Family Therapy Goals Statement (PT)  return home  -MS (r) AB (t) MS (c)     Rehab Potential (PT)  good, to achieve stated therapy goals  -MS (r) AB (t) MS (c)     Criteria for Skilled Interventions Met (PT)  yes  -MS (r) AB (t) MS (c)     Predicted Duration of Therapy Intervention (PT)  until discharge  -MS (r) AB (t) MS (c)     Row Name 02/09/21 0907          Positioning and Restraints    Pre-Treatment Position  in bed  -MS (r) AB (t) MS (c)     Post Treatment Position  chair  -MS (r) AB (t) MS (c)     In Chair  sitting;call light within reach;encouraged to call for assist;legs elevated  -MS (r) AB (t) MS (c)       User Key  (r) = Recorded By, (t) = Taken By, (c) = Cosigned By    Initials Name Provider Type    Nicole Palacios, PT, DPT, NCS Physical Therapist    AB Familia Ambriz, PT Student PT Student        Outcome Measures     Row Name 02/09/21 0907          How much help from another person do you currently need...    Turning from your back to your side while in flat bed without using  bedrails?  4  -MS (r) AB (t) MS (c)     Moving from lying on back to sitting on the side of a flat bed without bedrails?  4  -MS (r) AB (t) MS (c)     Moving to and from a bed to a chair (including a wheelchair)?  4  -MS (r) AB (t) MS (c)     Standing up from a chair using your arms (e.g., wheelchair, bedside chair)?  3  -MS (r) AB (t) MS (c)     Climbing 3-5 steps with a railing?  2  -MS (r) AB (t) MS (c)     To walk in hospital room?  3  -MS (r) AB (t) MS (c)     AM-PAC 6 Clicks Score (PT)  20  -MS (r) AB (t)     Row Name 02/09/21 0907          Functional Assessment    Outcome Measure Options  AM-PAC 6 Clicks Basic Mobility (PT)  -MS (r) AB (t) MS (c)       User Key  (r) = Recorded By, (t) = Taken By, (c) = Cosigned By    Initials Name Provider Type    MS Nicole Olson R, PT, DPT, NCS Physical Therapist    AB Familia Ambriz, PT Student PT Student        Physical Therapy Education                 Title: PT OT SLP Therapies (In Progress)     Topic: Physical Therapy (Done)     Point: Mobility training (Done)     Learning Progress Summary           Patient Acceptance, E, VU by AB at 2/9/2021 0948    Comment: PT role in care, plan of care, d/c plan, R hip precautions                   Point: Home exercise program (Done)     Learning Progress Summary           Patient Acceptance, E, VU by AB at 2/9/2021 0948    Comment: PT role in care, plan of care, d/c plan, R hip precautions                   Point: Body mechanics (Done)     Learning Progress Summary           Patient Acceptance, E, VU by AB at 2/9/2021 0948    Comment: PT role in care, plan of care, d/c plan, R hip precautions                   Point: Precautions (Done)     Learning Progress Summary           Patient Acceptance, E, VU by AB at 2/9/2021 0948    Comment: PT role in care, plan of care, d/c plan, R hip precautions                               User Key     Initials Effective Dates Name Provider Type [facility] 12/02/20 -  Familia Ambriz, PT  Student PT Student PT              PT Recommendation and Plan  Planned Therapy Interventions (PT): balance training, bed mobility training, gait training, patient/family education, ROM (range of motion), stair training, strengthening, transfer training  Plan of Care Reviewed With: patient  Progress: no change  Outcome Summary: PT eval is complete. Pt O&Ax4 and cooperative with PT. Pt had been up with nsg prior to PT arrival. Pt reported she walked to bathroom and back to bed with no FWW. PT educated pt to use FWW at all times when walking in order to maintain PWB on her RLE to protect her surgical site. Pt was able to verbalize and maintain her hip precuations throughout mobility assessment. ROM and strength deficits noted in RLE with more significant deficit in hip flexion and knee extension. LLE ROM and MMT WFL. Pt able to ambulate 40 feet with CGA and FWW. Bed mobility performed with supervision assist. Supine-sit transfer SBA. Sit-stand from bed and toilet with CGA indicating functional LE strength WFL. Pt offered no complaints of dizziness or lightheadedness throughout PT eval. Pt to benefit from PT services during this hospital stay to improve RLE strength, ROM, and functional mobility. PT recommends discharge to home with assist at this time.     Time Calculation:   PT Charges     Row Name 02/09/21 0907             Time Calculation    Start Time  0900 low complex 3; 7 min chart review  -MS (r) AB (t) MS (c)      Stop Time  0939  -MS (r) AB (t) MS (c)      Time Calculation (min)  39 min  -MS (r) AB (t)      PT Received On  02/09/21  -MS (r) AB (t) MS (c)      PT Goal Re-Cert Due Date  02/19/21  -MS (r) AB (t) MS (c)        User Key  (r) = Recorded By, (t) = Taken By, (c) = Cosigned By    Initials Name Provider Type    Nicole Palacios, PT, DPT, NCS Physical Therapist    Familia Bruce, PT Student PT Student            PT G-Codes  Outcome Measure Options: AM-PAC 6 Clicks Basic Mobility (PT)  AM-PAC 6  Clicks Score (PT): 20  AM-PAC 6 Clicks Score (OT): (P) 21    Familia Ambriz, PT Student  2/9/2021

## 2021-02-09 NOTE — THERAPY DISCHARGE NOTE
Acute Care - Occupational Therapy Initial Evaluation/Discharge  [facility]     Patient Name: Tawny Shin  : 1955  MRN: [id number]  Today's Date: 2021  Onset of Illness/Injury or Date of Surgery: 21  Date of Referral to OT: 21  Referring Physician: Beverly      Admit Date: 2021       ICD-10-CM ICD-9-CM   1. Altered mental status, unspecified altered mental status type  R41.82 780.97   2. Syncope, unspecified syncope type  R55 780.2   3. Impaired mobility  Z74.09 799.89     Patient Active Problem List   Diagnosis   • Eustachian tube dysfunction   • Sensorineural hearing loss   • Lumbago of multiple sites in spine with sciatica   • Spondylolisthesis of lumbar region   • Coronary artery disease   • Hyperlipidemia   • Hypertension   • Myalgia due to statin   • S/P coronary artery stent placement   • Pacemaker   • Seropositive rheumatoid arthritis of multiple sites (CMS/HCC)   • Sick sinus syndrome (CMS/HCC)   • Other osteoporosis without current pathological fracture   • Allergic-infective asthma   • Arthritis of both knees   • Vasovagal syncope   • Bilateral bunions   • Bronchitis   • Cardiac pacemaker syndrome   • Charcot's joint of foot, left   • Constipation   • 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   • Intrinsic asthma   • Left carotid bruit   • Neutropenia (CMS/HCC)   • Obesity   • Primary osteoarthritis of left knee   • Psoriasis vulgaris   • Recurrent acute serous otitis media of both ears   • S/P lumbar laminectomy   • Thrombocytopenia (CMS/HCC)   • Hypothyroidism   • Vitamin D deficiency   • Myxedema heart disease   • Spondylolisthesis of lumbar region   • Syncope, cardiogenic   • Rupture of gluteus minimus tendon   • Muscle tear   • Syncope   • Leukocytosis   • Headache   • Elevated CPK     Past Medical History:   Diagnosis Date   • Arthritis    • Asthma    • Chronic sinusitis    • Coronary artery disease    • Eustachian tube dysfunction    • Heart disease    • Herpes simplex    •  Hyperlipidemia    • Hypertension    • Knee dislocation    • Labral tear of right hip joint    • Laryngitis sicca    • Laryngitis, chronic    • MI (myocardial infarction) (CMS/HCC)    • Otorrhea    • Sensorineural hearing loss    • Sjogren's disease (CMS/HCC)      Past Surgical History:   Procedure Laterality Date   • A-V CARDIAC PACEMAKER INSERTION  2016   • ATRIAL CARDIAC PACEMAKER INSERTION     • CARDIAC CATHETERIZATION     • CATARACT EXTRACTION     • COLONOSCOPY  11/08/2011    One fold in the ascending colon which showed ulcer otherwise normal exam   • COLONOSCOPY  11/12/2004    Normal exam repeat in five years   • CORONARY ANGIOPLASTY WITH STENT PLACEMENT      X 2; 2013 & 2014   • ENDOSCOPY  07/10/2014    Normal exam   • HIP ABDUCTION TENOTOMY BILATERAL Right 1/14/2021    Procedure: RIGHT HIP GLUTEUS MEDLUS / MINIMUS REPAIR, POSSIBLE ACHILLES ALLOGRAFT;  Surgeon: Nino Carlson MD;  Location:  [facility] OR;  Service: Orthopedics;  Laterality: Right;   • JOINT REPLACEMENT     • LUMBAR FUSION N/A 1/19/2018    Procedure: L3-4,L4-5 DECOMPRESSION, POSTERIOR SPINAL FUSION WITH INSTRUMENTATION;  Surgeon: Fortino Oropeza MD;  Location:  [facility];  Service:    • LUMBAR LAMINECTOMY WITH FUSION Left 1/17/2018    Procedure: LEFT L3-4 L4-5 LATERAL LUMBAR INTERBODY FUSION;  Surgeon: Fortino Oropeza MD;  Location:  [facility];  Service:    • MYRINGOTOMY W/ TUBES  09/04/2014    TUBES NO LONGER IN PLACE   • OTHER SURGICAL HISTORY      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 so hardware was removed and spacers were placed   • REPLACEMENT TOTAL KNEE Right           OT ASSESSMENT FLOWSHEET (last 12 hours)      OT Evaluation and Treatment     Row Name 02/09/21 0900                   OT Time and Intention    Subjective Information  no complaints  -AC (r) EP (t) AC (c)        Document Type  evaluation  -AC (r) EP (t) AC (c)        Mode of Treatment  occupational therapy  -AC (r) EP (t) AC (c)           General Information    Patient Profile  Reviewed  yes  -AC (r) EP (t) AC (c)        Onset of Illness/Injury or Date of Surgery  02/08/21  -AC (r) EP (t) AC (c)        Referring Physician  Beverly  -AC (r) EP (t) AC (c)        Prior Level of Function  independent:;all household mobility;community mobility;transfer;bed mobility;ADL's;home management  -AC (r) EP (t) AC (c)        Equipment Currently Used at Home  walker, rolling;shower chair;grab bar reacher   -AC (r) EP (t) AC (c)        Pertinent History of Current Functional Problem  syncopal episodes; passed out while receiving RA injection and found on floor by sister both on 2/8/21; hx: recent labral tear of R hip joint w/ repair 1/14/21, CAD, HTN  -AC (r) EP (t) AC (c)        Existing Precautions/Restrictions  fall;hip, posterior;right RLE PWB 50% per pt   -AC (r) EP (t) AC (c)        Barriers to Rehab  previous functional deficit  -AC (r) EP (t) AC (c)           Living Environment    Current Living Arrangements  home/apartment/condo  -AC (r) EP (t) AC (c)        Home Accessibility  stairs to enter home;stairs within home walk in shower   -AC (r) EP (t) AC (c)        Lives With  alone  -AC (r) EP (t) AC (c)           Home Main Entrance    Number of Stairs, Main Entrance  one  -AC (r) EP (t) AC (c)        Stair Railings, Main Entrance  none  -AC (r) EP (t) AC (c)           Stairs Within Home, Primary    Number of Stairs, Within Home, Primary  none  -AC (r) EP (t) AC (c)        Stair Railings, Within Home, Primary  none  -AC (r) EP (t) AC (c)           Sensory    Additional Documentation  Sensory Assessment (Somatosensory) (Group)  -AC (r) EP (t) AC (c)           Sensory Assessment (Somatosensory)    Sensory Assessment (Somatosensory)  UE sensation intact pt report n/t in B feet at home   -AC (r) EP (t) AC (c)           Cognition    Affect/Mental Status (Cognitive)  WFL  -AC (r) EP (t) AC (c)        Orientation Status (Cognition)  oriented x 4  -AC (r) EP (t) AC (c)        Follows Commands (Cognition)   WFL  -AC (r) EP (t) AC (c)        Cognitive Function (Cognitive)  WFL  -AC (r) EP (t) AC (c)        Personal Safety Interventions  fall prevention program maintained;gait belt;muscle strengthening facilitated;nonskid shoes/slippers when out of bed;supervised activity  -AC (r) EP (t) AC (c)           Pain Scale: Numbers Pre/Post-Treatment    Pretreatment Pain Rating  3/10  -AC (r) EP (t) AC (c)        Posttreatment Pain Rating  3/10  -AC (r) EP (t) AC (c)        Pain Location - Side  Right  -AC (r) EP (t) AC (c)        Pain Location - Orientation  incisional  -AC (r) EP (t) AC (c)        Pain Intervention(s)  Repositioned;Ambulation/increased activity  -AC (r) EP (t) AC (c)           Range of Motion Comprehensive    Comment, General Range of Motion  BUE AROM WFL; except for R 4th and 5th digit flexion d/t RA  -AC (r) EP (t) AC (c)           Strength Comprehensive (MMT)    Comment, General Manual Muscle Testing (MMT) Assessment  B elbow and shoulder 4+/5, R : 3+/5, L : 4/5  -AC (r) EP (t) AC (c)           Mobility    Extremity Weight-bearing Status  right lower extremity  -AC (r) EP (t) AC (c)        Right Lower Extremity (Weight-bearing Status)  partial weight-bearing (PWB) 50% WB   -AC (r) EP (t) AC (c)           Bed Mobility    Bed Mobility  scooting/bridging;supine-sit;rolling left  -AC (r) EP (t) AC (c)        Rolling Left [place] (Bed Mobility)  supervision  -AC (r) EP (t) AC (c)        Scooting/Bridging [place] (Bed Mobility)  supervision  -AC (r) EP (t) AC (c)        Supine-Sit [place] (Bed Mobility)  supervision  -AC (r) EP (t) AC (c)        Assistive Device (Bed Mobility)  bed rails;head of bed elevated  -AC (r) EP (t) AC (c)           Functional Mobility    Functional Mobility- Ind. Level  contact guard assist;verbal cues required  -AC (r) EP (t) AC (c)        Functional Mobility- Device  rolling walker  -AC (r) EP (t) AC (c)        Functional Mobility- Comment  bed-BR-chair  -AC (r)  EP (t) AC (c)           Transfer Assessment/Treatment    Transfers  sit-stand transfer;stand-sit transfer;toilet transfer  -AC (r) EP (t) AC (c)           Transfers    Sit-Stand [place] (Transfers)  contact guard;verbal cues  -AC (r) EP (t) AC (c)        Stand-Sit [place] (Transfers)  contact guard;verbal cues  -AC (r) EP (t) AC (c)        [place] Level (Toilet Transfer)  minimum assist (75% patient effort);verbal cues d/t not having RW in front of her   -AC (r) EP (t) AC (c)        Assistive Device (Toilet Transfer)  commode;grab bars/safety frame;walker, front-wheeled  -AC (r) EP (t) AC (c)           Sit-Stand Transfer    Assistive Device (Sit-Stand Transfers)  walker, front-wheeled  -AC (r) EP (t) AC (c)           Stand-Sit Transfer    Assistive Device (Stand-Sit Transfers)  walker, front-wheeled  -AC (r) EP (t) AC (c)           Toilet Transfer    Type (Toilet Transfer)  sit-stand;stand-sit  -AC (r) EP (t) AC (c)           Safety Issues, Functional Mobility    Impairments Affecting Function (Mobility)  balance;endurance/activity tolerance;grasp;sensation/sensory awareness;strength  -AC (r) EP (t) AC (c)           Motor Skills    Motor Skills  coordination  -AC (r) EP (t) AC (c)        Coordination  WFL;bilateral;upper extremity;finger to nose oppostion; unable to reach 5th digit to 1st digit LUE d/t RA  -AC (r) EP (t) AC (c)           Balance    Balance Assessment  sitting static balance;sitting dynamic balance;standing static balance;standing dynamic balance  -AC (r) EP (t) AC (c)        Static Sitting Balance  WFL;sitting, edge of bed  -AC (r) EP (t) AC (c)        Dynamic Sitting Balance  WFL;sitting, edge of bed  -AC (r) EP (t) AC (c)        Static Standing Balance  WFL;supported;standing  -AC (r) EP (t) AC (c)        Dynamic Standing Balance  WFL;supported;standing  -AC (r) EP (t) AC (c)           Activities of Daily Living    BADL Assessment/Intervention  toileting;grooming  -AC (r) EP (t) AC  (c)           Grooming Assessment/Training    [place] Level (Grooming)  wash face, hands;standby assist  -AC (r) EP (t) AC (c)        Position (Grooming)  sink side;supported standing  -AC (r) EP (t) AC (c)           Toileting Assessment/Training    [place] Level (Toileting)  toileting skills;standby assist  -AC (r) EP (t) AC (c)        Assistive Devices (Toileting)  commode;grab bar/safety frame  -AC (r) EP (t) AC (c)        Position (Toileting)  unsupported sitting;supported standing  -AC (r) EP (t) AC (c)           BADL Safety/Performance    Impairments, BADL Safety/Performance  balance;endurance/activity tolerance;grasp/prehension;sensory awareness;strength  -AC (r) EP (t) AC (c)           Wound 01/14/21 0950 Right anterior hip Incision    Wound - Properties Group Placement Date: 01/14/21  -AS Placement Time: 0950  -AS Side: Right  -AS Orientation: anterior  -AS Location: hip  -AS Primary Wound Type: Incision  -AS    Retired Wound - Properties Group Date first assessed: 01/14/21  -AS Time first assessed: 0950  -AS Side: Right  -AS Location: hip  -AS Primary Wound Type: Incision  -AS       Plan of Care Review    Plan of Care Reviewed With  patient  -AC (r) EP (t) AC (c)        Progress  no change  -AC (r) EP (t) AC (c)        Outcome Summary  OT eval completed. Pt has RA, which mildly affects hand strength, ROM, and FMC. Pt was CGA-Min A for all t/fs. Pt demos good overall balance. Completed toileting and grooming w/ SBA. Pt c/o of no dizziness or lightheadness throughout session. Pt has appropriate DME and AE at home. Pt is aware of hip precautions and PWB status and is adhering to them. Further OT services not warranted. Recommend d/c to home w/ assist/  -AC (r) EP (t) AC (c)           Positioning and Restraints    Pre-Treatment Position  in bed  -AC (r) EP (t) AC (c)        Post Treatment Position  chair  -AC (r) EP (t) AC (c)        In Chair  sitting;call light within reach;encouraged to call for  assist;legs elevated  -AC (r) EP (t) AC (c)           Therapy Assessment/Plan (OT)    Date of Referral to OT  02/08/21  -AC (r) EP (t) AC (c)        Criteria for Skilled Therapeutic Interventions Met (OT)  no problems identified which require skilled intervention  -AC (r) EP (t) AC (c)        Therapy Frequency (OT)  evaluation only  -AC (r) EP (t) AC (c)          User Key  (r) = Recorded By, (t) = Taken By, (c) = Cosigned By    Initials Name Effective Dates    AC Sebastián Howe, OTR/L, CNT 04/09/19 -     AS Miguelito Quintana, RN, BSN 05/06/20 -     Ashley Mckay, OT Student 11/24/20 -           Occupational Therapy Education                 Title: PT OT SLP Therapies (In Progress)     Topic: Occupational Therapy (In Progress)     Point: ADL training (Done)     Description:   Instruct learner(s) on proper safety adaptation and remediation techniques during self care or transfers.   Instruct in proper use of assistive devices.              Learning Progress Summary           Patient Acceptance, E, VU by EP at 2/9/2021 1012                   Point: Home exercise program (Not Started)     Description:   Instruct learner(s) on appropriate technique for monitoring, assisting and/or progressing therapeutic exercises/activities.              Learner Progress:  Not documented in this visit.          Point: Precautions (Done)     Description:   Instruct learner(s) on prescribed precautions during self-care and functional transfers.              Learning Progress Summary           Patient Acceptance, E, VU by EP at 2/9/2021 1012                   Point: Body mechanics (Done)     Description:   Instruct learner(s) on proper positioning and spine alignment during self-care, functional mobility activities and/or exercises.              Learning Progress Summary           Patient Acceptance, E, VU by EP at 2/9/2021 1012                               User Key     Initials Effective Dates Name Provider Type Discipline    EP  11/24/20 -  Ashley Lechuga, OT Student OT Student OT                OT Recommendation and Plan  Therapy Frequency (OT): evaluation only  Plan of Care Review  Plan of Care Reviewed With: patient  Progress: no change  Outcome Summary: OT eval completed. Pt has RA, which mildly affects hand strength, ROM, and FMC. Pt was CGA-Min A for all t/fs. Pt demos good overall balance. Completed toileting and grooming w/ SBA. Pt c/o of no dizziness or lightheadness throughout session. Pt has appropriate DME and AE at home. Pt is aware of hip precautions and PWB status and is adhering to them. Further OT services not warranted. Recommend d/c to home w/ assist/  Plan of Care Reviewed With: patient  Outcome Summary: OT eval completed. Pt has RA, which mildly affects hand strength, ROM, and FMC. Pt was CGA-Min A for all t/fs. Pt demos good overall balance. Completed toileting and grooming w/ SBA. Pt c/o of no dizziness or lightheadness throughout session. Pt has appropriate DME and AE at home. Pt is aware of hip precautions and PWB status and is adhering to them. Further OT services not warranted. Recommend d/c to home w/ assist/     Rehab Goal Summary     Row Name 02/09/21 0907             Bed Mobility Goal 1 (PT)    Activity/Assistive Device (Bed Mobility Goal 1, PT)  bed mobility activities, all  -MS (r) AB (t) MS (c)      [place] Level/Cues Needed (Bed Mobility Goal 1, PT)  independent  -MS (r) AB (t) MS (c)      Time Frame (Bed Mobility Goal 1, PT)  long term goal (LTG);10 days  -MS (r) AB (t) MS (c)      Progress/Outcomes (Bed Mobility Goal 1, PT)  goal ongoing  -MS (r) AB (t) MS (c)         Transfer Goal 1 (PT)    Activity/Assistive Device (Transfer Goal 1, PT)  transfers, all  -MS (r) AB (t) MS (c)      [place] Level/Cues Needed (Transfer Goal 1, PT)  supervision required  -MS (r) AB (t) MS (c)      Time Frame (Transfer Goal 1, PT)  long term goal (LTG);10 days  -MS (r) AB (t) MS (c)      Progress/Outcome (Transfer  Goal 1, PT)  goal ongoing  -MS (r) AB (t) MS (c)         Gait Training Goal 1 (PT)    Activity/Assistive Device (Gait Training Goal 1, PT)  gait (walking locomotion)  -MS (r) AB (t) MS (c)      [place] Level (Gait Training Goal 1, PT)  modified independence  -MS (r) AB (t) MS (c)      Distance (Gait Training Goal 1, PT)  100  -MS (r) AB (t) MS (c)      Time Frame (Gait Training Goal 1, PT)  long term goal (LTG);10 days  -MS (r) AB (t) MS (c)      Progress/Outcome (Gait Training Goal 1, PT)  goal ongoing  -MS (r) AB (t) MS (c)         Stairs Goal 1 (PT)    Activity/Assistive Device (Stairs Goal 1, PT)  stairs, all skills  -MS (r) AB (t) MS (c)      [place] Level/Cues Needed (Stairs Goal 1, PT)  modified independence  -MS (r) AB (t) MS (c)      Number of Stairs (Stairs Goal 1, PT)  1  -MS (r) AB (t) MS (c)      Time Frame (Stairs Goal 1, PT)  long term goal (LTG);10 days  -MS (r) AB (t) MS (c)      Progress/Outcome (Stairs Goal 1, PT)  goal ongoing  -MS (r) AB (t) MS (c)        User Key  (r) = Recorded By, (t) = Taken By, (c) = Cosigned By    Initials Name Provider Type Discipline    Nicole Palacios R, PT, DPT, NCS Physical Therapist PT    AB Familia Ambriz, PT Student PT Student PT          Outcome Measures     Row Name 02/09/21 0900             How much help from another is currently needed...    Putting on and taking off regular lower body clothing?  3 w/ AE   -AC (r) EP (t) AC (c)      Bathing (including washing, rinsing, and drying)  3  -AC (r) EP (t) AC (c)      Toileting (which includes using toilet bed pan or urinal)  3  -AC (r) EP (t) AC (c)      Putting on and taking off regular upper body clothing  4  -AC (r) EP (t) AC (c)      Taking care of personal grooming (such as brushing teeth)  4  -AC (r) EP (t) AC (c)      Eating meals  4  -AC (r) EP (t) AC (c)      AM-PAC 6 Clicks Score (OT)  21  -AC (r) EP (t)         Functional Assessment    Outcome Measure Options  AM-PAC 6 Clicks Daily Activity  (OT)  -AC (r) EP (t) AC (c)        User Key  (r) = Recorded By, (t) = Taken By, (c) = Cosigned By    Initials Name Provider Type    Sebastián Catherine, OTR/L, MARI Occupational Therapist    Ashley Mckay, OT Student OT Student          Time Calculation:   Time Calculation- OT     Row Name 02/09/21 0944             Time Calculation- OT    OT Start Time  0900  -AC (r) EP (t) AC (c)      OT Stop Time  0930  -AC (r) EP (t) AC (c)      OT Time Calculation (min)  30 min  -AC (r) EP (t)      OT Received On  02/09/21  -AC (r) EP (t) AC (c)        User Key  (r) = Recorded By, (t) = Taken By, (c) = Cosigned By    Initials Name Provider Type    Sebastián Catherine, OTR/L, MARI Occupational Therapist    Ashley Mckay, OT Student OT Student                   OT Discharge Summary  Anticipated Discharge Disposition (OT): home with assist  Reason for Discharge: At baseline function  Outcomes Achieved: Other  Discharge Destination: Home with assist    LINDSAY Gutierrez  2/9/2021

## 2021-02-09 NOTE — CONSULTS
Orthopaedic Surgery Consult Note      2/9/2021   12:19 CST    Name:  Tawny Shin  MRN:    [id number]     Acct:     [id number]   Room:  [facility] Day: 0     Admit Date: 2/8/2021  PCP: Davon Urrutia MD        Subjective:     HPI: 65 y.o. 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  Most recently had increasing hip pain and mild drainage from her right hip.  About 3 weeks postoperatively, noticed a very small area or redness and bleeding around the mid portion of her incision.  Patient had a procedure 1 day prior and had a vasovagal episode as a result.  Later had another episode resulting in a fall so EMS was called and patient presented to ED.  Our service consulted due to recent procedure and her dressing being saturated.  CT scan showed no obvious fluid collection or abscess.  Current workup thus far has shown elevated ESR/CRP.  Wound culture obtained but pending.  Blood cultures pending.  Patient today overall tells me she feels unwell and is having quite a bit of right hip pain.  She states that when she first noticed the drainage to her hip, it was scant and looked more like a scab so she was not overly concerned about this.        Medications:     Allergies:   Allergies   Allergen Reactions   • Atorvastatin Other (See Comments)     LEG CRAMPS     • Amoxicillin Rash   • Escitalopram Unknown - Low Severity   • Nabumetone Rash   • Niacin Er Rash   • Penicillins Rash   • Simvastatin Rash       Current Meds:   Current Facility-Administered Medications   Medication Dose Route Frequency Provider Last Rate Last Admin   • acetaminophen (TYLENOL) tablet 650 mg  650 mg Oral Q4H PRN Mayra Chong APRN        Or   • acetaminophen (TYLENOL) 160 MG/5ML solution 650 mg  650 mg Oral Q4H PRN Mayra Chong APRN        Or   • acetaminophen (TYLENOL) suppository 650 mg  650 mg Rectal Q4H PRN Mayra Chong, APRN       • aspirin EC tablet 81 mg  81 mg Oral Daily Mayra Chong, DANIELA   81  mg at 02/09/21 0934   • aztreonam (AZACTAM) 2 g/100 mL 0.9% NS (mbp)  2 g Intravenous Q8H Senthil Paul MD   Currently Infusing at 02/09/21 1012   • budesonide (PULMICORT) nebulizer solution 0.5 mg  0.5 mg Nebulization BID - RT Mayra Chong, APRN   0.5 mg at 02/09/21 0633   • carvedilol (COREG) tablet 12.5 mg  12.5 mg Oral BID With Meals Mayra Chong APRN   12.5 mg at 02/09/21 0934   • clopidogrel (PLAVIX) tablet 75 mg  75 mg Oral Daily Mayra Chong APRN   75 mg at 02/09/21 0935   • folic acid (FOLVITE) tablet 1,000 mcg  1,000 mcg Oral Daily Mayra Chong APRN   1,000 mcg at 02/09/21 0935   • guaiFENesin (MUCINEX) 12 hr tablet 600 mg  600 mg Oral Q12H Mayra Chong APRN   600 mg at 02/09/21 0934   • isosorbide mononitrate (IMDUR) 24 hr tablet 60 mg  60 mg Oral BID Mayra Chong APRN   60 mg at 02/09/21 0934   • levothyroxine (SYNTHROID, LEVOTHROID) tablet 75 mcg  75 mcg Oral Q AM Mayra Chong APRN   75 mcg at 02/09/21 0547   • nitroglycerin (NITROSTAT) SL tablet 0.4 mg  0.4 mg Sublingual Q5 Min PRN Mayra Chong APRN       • ondansetron (ZOFRAN) tablet 4 mg  4 mg Oral Q6H PRN Mayra Chong APRN        Or   • ondansetron (ZOFRAN) injection 4 mg  4 mg Intravenous Q6H PRN Mayra Chong, APRN       • pantoprazole (PROTONIX) EC tablet 40 mg  40 mg Oral Daily Mayra Chong, APRN   40 mg at 02/09/21 0938   • predniSONE (DELTASONE) tablet 5 mg  5 mg Oral Daily Mayra Chong APRN   5 mg at 02/09/21 0935   • sodium chloride 0.9 % flush 10 mL  10 mL Intravenous PRN Mayra Chong APRN       • sodium chloride 0.9 % flush 10 mL  10 mL Intravenous Q12H Mayra Chong APRN   10 mL at 02/09/21 0938   • sodium chloride 0.9 % flush 10 mL  10 mL Intravenous PRN Mayra Chong APRN       • sodium chloride 0.9 % infusion  75 mL/hr Intravenous Continuous Mayra Chong APRN 75 mL/hr at 02/09/21 1012 75 mL/hr at 02/09/21 1012   • traMADol (ULTRAM) tablet 50  mg  50 mg Oral Q6H PRN Mayra Chong APRN   50 mg at 02/09/21 0619   • valACYclovir (VALTREX) tablet 500 mg  500 mg Oral Daily Mayra Chong APRN   500 mg at 02/09/21 0935   • vancomycin (VANCOCIN) in iso-osmotic dextrose IVPB 1 g (premix) 200 mL  1,000 mg Intravenous Q12H Senthil Paul MD   1,000 mg at 02/09/21 0943           Data:     Code Status:    Code Status and Medical Interventions:   Ordered at: 02/08/21 2320     Level Of Support Discussed With:    Patient     Code Status:    CPR     Medical Interventions (Level of Support Prior to Arrest):    Full     Past Surgical History:   Procedure Laterality Date   • A-V CARDIAC PACEMAKER INSERTION  2016   • ATRIAL CARDIAC PACEMAKER INSERTION     • CARDIAC CATHETERIZATION     • CATARACT EXTRACTION     • COLONOSCOPY  11/08/2011    One fold in the ascending colon which showed ulcer otherwise normal exam   • COLONOSCOPY  11/12/2004    Normal exam repeat in five years   • CORONARY ANGIOPLASTY WITH STENT PLACEMENT      X 2; 2013 & 2014   • ENDOSCOPY  07/10/2014    Normal exam   • HIP ABDUCTION TENOTOMY BILATERAL Right 1/14/2021    Procedure: RIGHT HIP GLUTEUS MEDLUS / MINIMUS REPAIR, POSSIBLE ACHILLES ALLOGRAFT;  Surgeon: Nino Carlson MD;  Location:  [facility] OR;  Service: Orthopedics;  Laterality: Right;   • JOINT REPLACEMENT     • LUMBAR FUSION N/A 1/19/2018    Procedure: L3-4,L4-5 DECOMPRESSION, POSTERIOR SPINAL FUSION WITH INSTRUMENTATION;  Surgeon: Fortino Oropeza MD;  Location:  [facility] OR;  Service:    • LUMBAR LAMINECTOMY WITH FUSION Left 1/17/2018    Procedure: LEFT L3-4 L4-5 LATERAL LUMBAR INTERBODY FUSION;  Surgeon: Fortino Oropeza MD;  Location:  [facility] OR;  Service:    • MYRINGOTOMY W/ TUBES  09/04/2014    TUBES NO LONGER IN PLACE   • OTHER SURGICAL HISTORY      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 so hardware was removed and spacers were placed   • REPLACEMENT TOTAL KNEE Right        Family History   Problem Relation Age of Onset   • Cancer Mother     • Heart disease Father        Social History     Socioeconomic History   • Marital status:      Spouse name: Not on file   • Number of children: Not on file   • Years of education: Not on file   • Highest education level: Not on file   Tobacco Use   • Smoking status: Never Smoker   • Smokeless tobacco: Never Used   Substance and Sexual Activity   • Alcohol use: No   • Drug use: Never   • Sexual activity: Defer       Past Medical History:   Diagnosis Date   • Arthritis    • Asthma    • Chronic sinusitis    • Coronary artery disease    • Eustachian tube dysfunction    • Heart disease    • Herpes simplex    • Hyperlipidemia    • Hypertension    • Knee dislocation    • Labral tear of right hip joint    • Laryngitis sicca    • Laryngitis, chronic    • MI (myocardial infarction) (CMS/[facility])    • Otorrhea    • Sensorineural hearing loss    • Sjogren's disease (CMS/[facility])          Review of Symptoms:  CONSTITUTIONAL:  negative for  fevers, chills, sweats, fatigue, malaise, anorexia and weight loss  EYES:  negative for  double vision, blurred vision and visual disturbance  HEENT:  negative for  hearing loss, tinnitus, ear drainage, earaches, nasal congestion, epistaxis, snoring, sore mouth, sore throat, hoarseness and voice change  RESPIRATORY:  negative for  dry cough, cough with sputum, dyspnea, wheezing, hemoptysis, chest pain, pleuritic pain and cyanosis  CARDIOVASCULAR:  negative for  chest pain, palpitations, orthopnea, exertional chest pressure/discomfort, edema  GASTROINTESTINAL:  negative for nausea, vomiting, change in bowel habits, diarrhea, constipation, abdominal pain, jaundice, dysphagia, regurgitation, hematemesis and hemtochezia  GENITOURINARY:  negative for frequency, dysuria, nocturia, hesitancy and hematuria  INTEGUMENT/BREAST:  negative for rash, skin lesion(s), dryness, skin color change, pruritus, changes in hair and changes in nails  HEMATOLOGIC/LYMPHATIC:  negative for easy bruising, bleeding,  "lymphadenopathy, petechiae and swelling/edema  ALLERGIC/IMMUNOLOGIC:  negative for recurrent infections and urticaria  ENDOCRINE:  negative for heat intolerance, cold intolerance, tremor, weight changes, hair loss and diabetic symptoms including neither polyuria nor polydipsia  MUSCULOSKELETAL:  See HPI  NEUROLOGICAL:  negative for headaches, dizziness, seizures, memory problems, speech problems, visual disturbance, coordination problems, gait problems, tremor, syncope and near syncope  BEHAVIOR/PSYCH:  negative for depressed mood, elated mood, increased agitation and anxiety      Vitals:  /65 (BP Location: Right arm, Patient Position: Standing)   Pulse 78   Temp 98.5 °F (36.9 °C) (Oral)   Resp 16   Ht 170.2 cm (67\")   Wt 95.1 kg (209 lb 9.6 oz)   SpO2 98%   BMI 32.83 kg/m²   Temp (24hrs), Av.2 °F (37.3 °C), Min:98.5 °F (36.9 °C), Max:100.2 °F (37.9 °C)      I/O (24Hr):    Intake/Output Summary (Last 24 hours) at 2021 1219  Last data filed at 2021 0745  Gross per 24 hour   Intake 1100 ml   Output 600 ml   Net 500 ml       Labs:  Lab Results (last 72 hours)     Procedure Component Value Units Date/Time    Lipid Panel [[id number]]  (Abnormal) Collected: 21    Specimen: Blood Updated: 2156     Total Cholesterol 129 mg/dL      Triglycerides 108 mg/dL      HDL Cholesterol 36 mg/dL      LDL Cholesterol  73 mg/dL      VLDL Cholesterol 20 mg/dL      LDL/HDL Ratio 1.98    Narrative:      Cholesterol Reference Ranges  (U.S. Department of Health and Human Services ATP III Classifications)    Desirable          <200 mg/dL  Borderline High    200-239 mg/dL  High Risk          >240 mg/dL      Triglyceride Reference Ranges  (U.S. Department of Health and Human Services ATP III Classifications)    Normal           <150 mg/dL  Borderline High  150-199 mg/dL  High             200-499 mg/dL  Very High        >500 mg/dL    HDL Reference Ranges  (U.S. Department of Health and Human Services " ATP III Classifcations)    Low     <40 mg/dl (major risk factor for CHD)  High    >60 mg/dl ('negative' risk factor for CHD)        LDL Reference Ranges  (U.S. Department of Health and Human Services ATP III Classifcations)    Optimal          <100 mg/dL  Near Optimal     100-129 mg/dL  Borderline High  130-159 mg/dL  High             160-189 mg/dL  Very High        >189 mg/dL    Hemoglobin A1c [[id number]]  (Normal) Collected: 02/09/21 0426    Specimen: Blood Updated: 02/09/21 0535     Hemoglobin A1C 5.50 %     Narrative:      Hemoglobin A1C Ranges:    Increased Risk for Diabetes  5.7% to 6.4%  Diabetes                     >= 6.5%  Diabetic Goal                < 7.0%    Basic Metabolic Panel [[id number]]  (Abnormal) Collected: 02/09/21 0426    Specimen: Blood Updated: 02/09/21 0535     Glucose 109 mg/dL      BUN 16 mg/dL      Creatinine 0.66 mg/dL      Sodium 137 mmol/L      Potassium 3.6 mmol/L      Chloride 105 mmol/L      CO2 24.0 mmol/L      Calcium 8.7 mg/dL      eGFR Non African Amer 89 mL/min/1.73      BUN/Creatinine Ratio 24.2     Anion Gap 8.0 mmol/L     Narrative:      GFR Normal >60  Chronic Kidney Disease <60  Kidney Failure <15      C-reactive Protein [[id number]]  (Abnormal) Collected: 02/09/21 0426    Specimen: Blood Updated: 02/09/21 0535     C-Reactive Protein 15.58 mg/dL     Sedimentation Rate [[id number]]  (Abnormal) Collected: 02/09/21 0426    Specimen: Blood Updated: 02/09/21 0518     Sed Rate 21 mm/hr     CBC (No Diff) [[id number]]  (Abnormal) Collected: 02/09/21 0426    Specimen: Blood Updated: 02/09/21 0507     WBC 10.50 10*3/mm3      RBC 3.65 10*6/mm3      Hemoglobin 10.6 g/dL      Hematocrit 33.5 %      MCV 91.8 fL      MCH 29.0 pg      MCHC 31.6 g/dL      RDW 15.3 %      RDW-SD 51.0 fl      MPV 11.3 fL      Platelets 166 10*3/mm3     Wound Culture - Swab, Hip, Right [[id number]] Collected: 02/08/21 2018    Specimen: Swab from Hip, Right Updated: 02/09/21 0300     Gram Stain Rare (1+) WBCs seen       Rare (1+) Gram negative bacilli    Urinalysis With Culture If Indicated - Urine, Catheter [[id number]]  (Abnormal) Collected: 02/08/21 2058    Specimen: Urine, Catheter Updated: 02/08/21 2125     Color, UA Yellow     Appearance, UA Clear     pH, UA 5.5     Specific Gravity, UA 1.020     Glucose, UA Negative     Ketones, UA 15 mg/dL (1+)     Bilirubin, UA Negative     Blood, UA Negative     Protein, UA Trace     Leuk Esterase, UA Negative     Nitrite, UA Negative     Urobilinogen, UA 0.2 E.U./dL    Narrative:      Urine microscopic not indicated.    [place] Draw [[id number]] Collected: 02/08/21 1936    Specimen: Blood Updated: 02/08/21 2101    Narrative:      The following orders were created for panel order [place] Draw.  Procedure                               Abnormality         Status                     ---------                               -----------         ------                     Light Blue Top[[id number]]                                   Final result               Green Top (Gel)[[id number]]                                  Final result               Lavender Top[[id number]]                                     Final result               Red Top[[id number]]                                          Final result               [place] Blood Culture Jacob...[[id number]]                      Final result               Gray Top - Ice[[id number]]                                   Final result                 Please view results for these tests on the individual orders.    [place] Blood Culture Bottle Set [[id number]] Collected: 02/08/21 1946    Specimen: Blood from Wrist, Left Updated: 02/08/21 2101     Extra Tube Hold for add-ons.     Comment: Auto resulted.       Light Blue Top [[id number]] Collected: 02/08/21 1936    Specimen: Blood from Arm, Right Updated: 02/08/21 2046     Extra Tube hold for add-on     Comment: Auto resulted       Green Top (Gel) [[id number]] Collected: 02/08/21 1936    Specimen: Blood from Arm,  Right Updated: 02/08/21 2046     Extra Tube Hold for add-ons.     Comment: Auto resulted.       Lavender Top [[id number]] Collected: 02/08/21 1936    Specimen: Blood from Arm, Right Updated: 02/08/21 2046     Extra Tube hold for add-on     Comment: Auto resulted       Red Top [[id number]] Collected: 02/08/21 1936    Specimen: Blood from Arm, Right Updated: 02/08/21 2046     Extra Tube Hold for add-ons.     Comment: Auto resulted.       Gray Top - Ice [[id number]] Collected: 02/08/21 1936    Specimen: Blood from Arm, Right Updated: 02/08/21 2046     Extra Tube Hold for add-ons.     Comment: Auto resulted.       COVID-19, [facility]OTT IN-HOUSE,NASAL Swab (NO TRANSPORT MEDIA) 2 HR TAT - Swab, Nasopharynx [[id number]]  (Normal) Collected: 02/08/21 1945    Specimen: Swab from Nasopharynx Updated: 02/08/21 2038     COVID19 Presumptive Negative    Narrative:      Fact sheet for providers: https://www.fda.gov/media/868094/download     Fact sheet for patients: https://www.fda.gov/media/577177/download    Test performed by PCR.  If inconsistent with clinical signs and symptoms patient should be tested with different authorized molecular test.    Blood Culture - Blood, Wrist, Left [[id number]] Collected: 02/08/21 1946    Specimen: Blood from Wrist, Left Updated: 02/08/21 2036    D-dimer, Quantitative [[id number]]  (Abnormal) Collected: 02/08/21 1936    Specimen: Blood from Arm, Right Updated: 02/08/21 2033     D-Dimer, Quantitative 13.53 mg/L (FEU)     Narrative:      Reference Range is 0-0.50 mg/L FEU. However, results <0.50 mg/L FEU tends to rule out DVT or PE. Results >0.50 mg/L FEU are not useful in predicting absence or presence of DVT or PE.      CK [[id number]]  (Abnormal) Collected: 02/08/21 1936    Specimen: Blood from Arm, Right Updated: 02/08/21 2025     Creatine Kinase 324 U/L     Procalcitonin [[id number]]  (Abnormal) Collected: 02/08/21 1936    Specimen: Blood from Arm, Right Updated: 02/08/21 2012     Procalcitonin 0.86 ng/mL  "    Narrative:      As a Marker for Sepsis (Non-Neonates):   1. <0.5 ng/mL represents a low risk of severe sepsis and/or septic shock.  1. >2 ng/mL represents a high risk of severe sepsis and/or septic shock.    As a Marker for Lower Respiratory Tract Infections that require antibiotic therapy:  PCT on Admission     Antibiotic Therapy             6-12 Hrs later  > 0.5                Strongly Recommended            >0.25 - <0.5         Recommended  0.1 - 0.25           Discouraged                   Remeasure/reassess PCT  <0.1                 Strongly Discouraged          Remeasure/reassess PCT      As 28 day mortality risk marker: \"Change in Procalcitonin Result\" (> 80 % or <=80 %) if Day 0 (or Day 1) and Day 4 values are available. Refer to http://www.[employer]pct-calculator.com/   Change in PCT <=80 %   A decrease of PCT levels below or equal to 80 % defines a positive change in PCT test result representing a higher risk for 28-day all-cause mortality of patients diagnosed with severe sepsis or septic shock.  Change in PCT > 80 %   A decrease of PCT levels of more than 80 % defines a negative change in PCT result representing a lower risk for 28-day all-cause mortality of patients diagnosed with severe sepsis or septic shock.                Results may be falsely decreased if patient taking Biotin.     Comprehensive Metabolic Panel [[id number]]  (Abnormal) Collected: 02/08/21 1936    Specimen: Blood from Arm, Right Updated: 02/08/21 2006     Glucose 114 mg/dL      BUN 17 mg/dL      Creatinine 0.81 mg/dL      Sodium 136 mmol/L      Potassium 4.0 mmol/L      Chloride 98 mmol/L      CO2 28.0 mmol/L      Calcium 9.3 mg/dL      Total Protein 6.7 g/dL      Albumin 3.60 g/dL      ALT (SGPT) 13 U/L      AST (SGOT) 25 U/L      Alkaline Phosphatase 91 U/L      Total Bilirubin 0.7 mg/dL      eGFR Non African Amer 71 mL/min/1.73      Globulin 3.1 gm/dL      A/G Ratio 1.2 g/dL      BUN/Creatinine Ratio 21.0     Anion Gap 10.0 " mmol/L     Narrative:      GFR Normal >60  Chronic Kidney Disease <60  Kidney Failure <15      Lactic Acid, Plasma [[id number]]  (Normal) Collected: 02/08/21 1936    Specimen: Blood from Arm, Right Updated: 02/08/21 2000     Lactate 1.2 mmol/L     CBC & Differential [[id number]]  (Abnormal) Collected: 02/08/21 1936    Specimen: Blood from Arm, Right Updated: 02/08/21 1947    Narrative:      The following orders were created for panel order CBC & Differential.  Procedure                               Abnormality         Status                     ---------                               -----------         ------                     CBC Auto Differential[[id number]]        Abnormal            Final result                 Please view results for these tests on the individual orders.    CBC Auto Differential [[id number]]  (Abnormal) Collected: 02/08/21 1936    Specimen: Blood from Arm, Right Updated: 02/08/21 1947     WBC 11.33 10*3/mm3      RBC 4.16 10*6/mm3      Hemoglobin 12.4 g/dL      Hematocrit 37.1 %      MCV 89.2 fL      MCH 29.8 pg      MCHC 33.4 g/dL      RDW 15.5 %      RDW-SD 50.1 fl      MPV 10.6 fL      Platelets 191 10*3/mm3      Neutrophil % 75.8 %      Lymphocyte % 12.6 %      Monocyte % 10.4 %      Eosinophil % 0.3 %      Basophil % 0.4 %      Immature Grans % 0.5 %      Neutrophils, Absolute 8.58 10*3/mm3      Lymphocytes, Absolute 1.43 10*3/mm3      Monocytes, Absolute 1.18 10*3/mm3      Eosinophils, Absolute 0.03 10*3/mm3      Basophils, Absolute 0.05 10*3/mm3      Immature Grans, Absolute 0.06 10*3/mm3      nRBC 0.0 /100 WBC     Blood Culture - Blood, Arm, Right [[id number]] Collected: 02/08/21 1936    Specimen: Blood from Arm, Right Updated: 02/08/21 1944          Imaging:  Ct Head Without Contrast    Result Date: 2/8/2021  1. No CT evidence of an acute intracranial process. 2. Extensive paranasal sinus disease.    This report was finalized on 02/08/2021 21:38 by Dr. Sterling Elizalde MD.    Ct Cervical  Spine Without Contrast    Result Date: 2/8/2021  1. No CT evidence of acute bony injury to the cervical spine. 2. Advanced disc degeneration and spondylosis C5-C6 and to a lesser degree C6-C7 with resulting canal and foraminal stenosis. This report was finalized on 02/08/2021 21:50 by Dr. Sterling Elizalde MD.    Ct Thoracic Spine Without Contrast    Result Date: 2/8/2021  1. No CT evidence of acute bony injury to the thoracic spine. This report was finalized on 02/08/2021 22:26 by Dr. Sterling Elizalde MD.    Ct Lumbar Spine Without Contrast    Result Date: 2/8/2021  1. No CT evidence of acute bony injury to the lumbar spine. 2. Extensive postsurgical changes. 3. Advanced disc degeneration and spondylosis at L2-L3 and L1-L2. This report was finalized on 02/08/2021 22:36 by Dr. Sterling Elizalde MD.    Xr Chest 1 View    Result Date: 2/8/2021  1. No acute cardiopulmonary process.  This report was finalized on 02/08/2021 22:03 by Dr. Sterling Elizalde MD.    Ct Angiogram Chest    Result Date: 2/8/2021  1. No CT angiographic evidence of pulmonary embolus or acute aortic pathology. 2. Mild reticular groundglass reticular opacities in the right upper lobe, etiology uncertain. Differential considerations includes mild infectious/inflammatory changes. Mild pulmonary contusion as well as chronic interstitial lung changes considered. Probable mild atelectasis in the lower lobes. Lung fields are otherwise clear. 3. Mild deformity right anterior fourth rib, question old trauma. Correlate with pedicle findings. No definite acute osseous abnormalities observed otherwise. This report was finalized on 02/08/2021 22:01 by Dr. Sterling Elizalde MD.    Ct Lower Extremity Right Without Contrast    Result Date: 2/9/2021  1. There is stranding of the subcutaneous tissues of the lateral right hip with no organized abscess collection. 2. Hardware removal suspected from the proximal right femur with no acute osseous pathology. This report was finalized on  02/09/2021 08:49 by Dr. Trinity Gómez MD.    Xr Hip With Or Without Pelvis 2 - 3 View Right    Result Date: 2/8/2021  1. No acute osseous abnormality. This report was finalized on 02/08/2021 22:55 by Dr. Sterling Elizalde MD.    Medical Data Review: CT reviewed by myself and Dr. Carlson.  No evidence of abscess/fluid collection.       Physical Exam:     General: Pleasant mood and appropriate affect. Well nourished.  HEENT: NC/AT, BANDAR, EOMI, Nares patent bilaterally with no epistaxis noted.  Neck: Soft throughout with no obvious masses.  Chest: +2 distal pulses throughout, no heaves or lifts seen.  Respiratory: Equal rise and fall bilaterally, no retractions or rhonchi or wheezes noted.  Abdomen: Obese and non tender.  Skin: Pink and dry with appropriate turgor.  Neuro: CN II-XII grossly intact, no focal deficits noted.  Right Hip:Dressing saturated. mid-portion of incision with mild purulent drainage with odor.  Wound is dehisced mid incision.  Remaineder of incision without dehiscence or drainage.  Moderately ttp about hip.  No significant pain with log roll testing.  Patient able to ambulate with partial WB to RLE.  NVI.       Assessment:     Primary Orthopaedic Problem  Post-operative wound dehiscence with infection  S/P Right hip gluteal repair on 1/14/2021    Plan:     · MRI of right hip  · Continue NPO for now  · I&D vs. Local wound care pending MRI findings  · PWB <50% with use of rolling walker to RLE  · Continue PT/OT, dressing changes, DVT ppx      Electronically signed by Silverio Parekh PA-C on 2/9/2021 at 12:19 CST     ADDENDUM:   Patient consented for surgical I&D of right hip infection to further explore the extent of her infection.  Will continue NPO and plan for OR tonight.

## 2021-02-10 ENCOUNTER — APPOINTMENT (OUTPATIENT)
Dept: CARDIOLOGY | Facility: HOSPITAL | Age: 66
End: 2021-02-10

## 2021-02-10 LAB
ALBUMIN SERPL-MCNC: 2.5 G/DL (ref 3.5–5.2)
ALBUMIN/GLOB SERPL: 0.8 G/DL
ALP SERPL-CCNC: 69 U/L (ref 39–117)
ALT SERPL W P-5'-P-CCNC: 17 U/L (ref 1–33)
ANION GAP SERPL CALCULATED.3IONS-SCNC: 8 MMOL/L (ref 5–15)
AST SERPL-CCNC: 77 U/L (ref 1–32)
BH CV ECHO MEAS - AO MAX PG (FULL): 3 MMHG
BH CV ECHO MEAS - AO MAX PG: 9.4 MMHG
BH CV ECHO MEAS - AO MEAN PG (FULL): 2 MMHG
BH CV ECHO MEAS - AO MEAN PG: 5 MMHG
BH CV ECHO MEAS - AO ROOT AREA (BSA CORRECTED): 1.7
BH CV ECHO MEAS - AO ROOT AREA: 9.1 CM^2
BH CV ECHO MEAS - AO ROOT DIAM: 3.4 CM
BH CV ECHO MEAS - AO V2 MAX: 153 CM/SEC
BH CV ECHO MEAS - AO V2 MEAN: 99.1 CM/SEC
BH CV ECHO MEAS - AO V2 VTI: 31.6 CM
BH CV ECHO MEAS - AVA(I,A): 2.6 CM^2
BH CV ECHO MEAS - AVA(I,D): 2.6 CM^2
BH CV ECHO MEAS - AVA(V,A): 2.6 CM^2
BH CV ECHO MEAS - AVA(V,D): 2.6 CM^2
BH CV ECHO MEAS - BSA(HAYCOCK): 2.2 M^2
BH CV ECHO MEAS - BSA: 2.1 M^2
BH CV ECHO MEAS - BZI_BMI: 32.7 KILOGRAMS/M^2
BH CV ECHO MEAS - BZI_METRIC_HEIGHT: 170.2 CM
BH CV ECHO MEAS - BZI_METRIC_WEIGHT: 94.8 KG
BH CV ECHO MEAS - EDV(CUBED): 121.3 ML
BH CV ECHO MEAS - EDV(MOD-SP4): 74.3 ML
BH CV ECHO MEAS - EDV(TEICH): 115.5 ML
BH CV ECHO MEAS - EF(CUBED): 76.8 %
BH CV ECHO MEAS - EF(MOD-SP4): 65.8 %
BH CV ECHO MEAS - EF(TEICH): 68.7 %
BH CV ECHO MEAS - ESV(CUBED): 28.1 ML
BH CV ECHO MEAS - ESV(MOD-SP4): 25.4 ML
BH CV ECHO MEAS - ESV(TEICH): 36.2 ML
BH CV ECHO MEAS - FS: 38.6 %
BH CV ECHO MEAS - IVS/LVPW: 1
BH CV ECHO MEAS - IVSD: 1.2 CM
BH CV ECHO MEAS - LA DIMENSION: 3.8 CM
BH CV ECHO MEAS - LA/AO: 1.1
BH CV ECHO MEAS - LAT PEAK E' VEL: 6.7 CM/SEC
BH CV ECHO MEAS - LV DIASTOLIC VOL/BSA (35-75): 36.1 ML/M^2
BH CV ECHO MEAS - LV MASS(C)D: 215.4 GRAMS
BH CV ECHO MEAS - LV MASS(C)DI: 104.6 GRAMS/M^2
BH CV ECHO MEAS - LV MAX PG: 6.4 MMHG
BH CV ECHO MEAS - LV MEAN PG: 3 MMHG
BH CV ECHO MEAS - LV SYSTOLIC VOL/BSA (12-30): 12.3 ML/M^2
BH CV ECHO MEAS - LV V1 MAX: 126 CM/SEC
BH CV ECHO MEAS - LV V1 MEAN: 79.8 CM/SEC
BH CV ECHO MEAS - LV V1 VTI: 26 CM
BH CV ECHO MEAS - LVIDD: 5 CM
BH CV ECHO MEAS - LVIDS: 3 CM
BH CV ECHO MEAS - LVLD AP4: 7.7 CM
BH CV ECHO MEAS - LVLS AP4: 6.4 CM
BH CV ECHO MEAS - LVOT AREA (M): 3.1 CM^2
BH CV ECHO MEAS - LVOT AREA: 3.1 CM^2
BH CV ECHO MEAS - LVOT DIAM: 2 CM
BH CV ECHO MEAS - LVPWD: 1.1 CM
BH CV ECHO MEAS - MED PEAK E' VEL: 8.7 CM/SEC
BH CV ECHO MEAS - MV A MAX VEL: 104 CM/SEC
BH CV ECHO MEAS - MV DEC SLOPE: 231 CM/SEC^2
BH CV ECHO MEAS - MV DEC TIME: 0.28 SEC
BH CV ECHO MEAS - MV E MAX VEL: 64.9 CM/SEC
BH CV ECHO MEAS - MV E/A: 0.62
BH CV ECHO MEAS - SI(AO): 139.3 ML/M^2
BH CV ECHO MEAS - SI(CUBED): 45.2 ML/M^2
BH CV ECHO MEAS - SI(LVOT): 39.6 ML/M^2
BH CV ECHO MEAS - SI(MOD-SP4): 23.7 ML/M^2
BH CV ECHO MEAS - SI(TEICH): 38.5 ML/M^2
BH CV ECHO MEAS - SV(AO): 286.9 ML
BH CV ECHO MEAS - SV(CUBED): 93.2 ML
BH CV ECHO MEAS - SV(LVOT): 81.7 ML
BH CV ECHO MEAS - SV(MOD-SP4): 48.9 ML
BH CV ECHO MEAS - SV(TEICH): 79.4 ML
BH CV ECHO MEASUREMENTS AVERAGE E/E' RATIO: 8.43
BILIRUB SERPL-MCNC: 0.7 MG/DL (ref 0–1.2)
BUN SERPL-MCNC: 17 MG/DL (ref 8–23)
BUN/CREAT SERPL: 28.8 (ref 7–25)
CALCIUM SPEC-SCNC: 8.7 MG/DL (ref 8.6–10.5)
CHLORIDE SERPL-SCNC: 103 MMOL/L (ref 98–107)
CO2 SERPL-SCNC: 24 MMOL/L (ref 22–29)
CREAT SERPL-MCNC: 0.59 MG/DL (ref 0.57–1)
DEPRECATED RDW RBC AUTO: 51 FL (ref 37–54)
ERYTHROCYTE [DISTWIDTH] IN BLOOD BY AUTOMATED COUNT: 15 % (ref 12.3–15.4)
GFR SERPL CREATININE-BSD FRML MDRD: 102 ML/MIN/1.73
GLOBULIN UR ELPH-MCNC: 3.2 GM/DL
GLUCOSE SERPL-MCNC: 83 MG/DL (ref 65–99)
HCT VFR BLD AUTO: 29 % (ref 34–46.6)
HGB BLD-MCNC: 9.1 G/DL (ref 12–15.9)
LEFT ATRIUM VOLUME INDEX: 15 ML/M2
LEFT ATRIUM VOLUME: 30.9 CM3
LV EF 2D ECHO EST: 65 %
MAXIMAL PREDICTED HEART RATE: 153 BPM
MCH RBC QN AUTO: 28.9 PG (ref 26.6–33)
MCHC RBC AUTO-ENTMCNC: 31.4 G/DL (ref 31.5–35.7)
MCV RBC AUTO: 92.1 FL (ref 79–97)
PLATELET # BLD AUTO: 136 10*3/MM3 (ref 140–450)
PMV BLD AUTO: 10.8 FL (ref 6–12)
POTASSIUM SERPL-SCNC: 3.8 MMOL/L (ref 3.5–5.2)
PROT SERPL-MCNC: 5.7 G/DL (ref 6–8.5)
RBC # BLD AUTO: 3.15 10*6/MM3 (ref 3.77–5.28)
SODIUM SERPL-SCNC: 135 MMOL/L (ref 136–145)
STRESS TARGET HR: 130 BPM
VANCOMYCIN TROUGH SERPL-MCNC: 11.6 MCG/ML (ref 5–20)
WBC # BLD AUTO: 7.46 10*3/MM3 (ref 3.4–10.8)

## 2021-02-10 PROCEDURE — 93306 TTE W/DOPPLER COMPLETE: CPT | Performed by: INTERNAL MEDICINE

## 2021-02-10 PROCEDURE — 25010000002 VANCOMYCIN PER 500 MG: Performed by: ORTHOPAEDIC SURGERY

## 2021-02-10 PROCEDURE — 63710000001 PREDNISONE PER 5 MG: Performed by: ORTHOPAEDIC SURGERY

## 2021-02-10 PROCEDURE — 25010000002 MORPHINE PER 10 MG

## 2021-02-10 PROCEDURE — 85027 COMPLETE CBC AUTOMATED: CPT | Performed by: INTERNAL MEDICINE

## 2021-02-10 PROCEDURE — 80053 COMPREHEN METABOLIC PANEL: CPT | Performed by: INTERNAL MEDICINE

## 2021-02-10 PROCEDURE — 80202 ASSAY OF VANCOMYCIN: CPT | Performed by: INTERNAL MEDICINE

## 2021-02-10 PROCEDURE — 93306 TTE W/DOPPLER COMPLETE: CPT

## 2021-02-10 PROCEDURE — 25010000002 VANCOMYCIN PER 500 MG: Performed by: INTERNAL MEDICINE

## 2021-02-10 PROCEDURE — 94799 UNLISTED PULMONARY SVC/PX: CPT

## 2021-02-10 PROCEDURE — 97116 GAIT TRAINING THERAPY: CPT

## 2021-02-10 PROCEDURE — 97164 PT RE-EVAL EST PLAN CARE: CPT

## 2021-02-10 PROCEDURE — 87147 CULTURE TYPE IMMUNOLOGIC: CPT | Performed by: INTERNAL MEDICINE

## 2021-02-10 PROCEDURE — 87040 BLOOD CULTURE FOR BACTERIA: CPT | Performed by: INTERNAL MEDICINE

## 2021-02-10 PROCEDURE — 97606 NEG PRS WND THER DME>50 SQCM: CPT

## 2021-02-10 RX ORDER — HYDROCODONE BITARTRATE AND ACETAMINOPHEN 5; 325 MG/1; MG/1
1.5 TABLET ORAL EVERY 4 HOURS PRN
Status: DISCONTINUED | OUTPATIENT
Start: 2021-02-10 | End: 2021-02-10

## 2021-02-10 RX ORDER — HYDROCODONE BITARTRATE AND ACETAMINOPHEN 7.5; 325 MG/1; MG/1
1 TABLET ORAL EVERY 4 HOURS PRN
Status: DISCONTINUED | OUTPATIENT
Start: 2021-02-10 | End: 2021-02-12 | Stop reason: HOSPADM

## 2021-02-10 RX ADMIN — CARVEDILOL 12.5 MG: 6.25 TABLET, FILM COATED ORAL at 17:25

## 2021-02-10 RX ADMIN — MORPHINE SULFATE 4 MG: 4 INJECTION, SOLUTION INTRAMUSCULAR; INTRAVENOUS at 16:32

## 2021-02-10 RX ADMIN — ASPIRIN 81 MG: 81 TABLET, FILM COATED ORAL at 09:05

## 2021-02-10 RX ADMIN — ISOSORBIDE MONONITRATE 60 MG: 60 TABLET, EXTENDED RELEASE ORAL at 20:16

## 2021-02-10 RX ADMIN — CLOPIDOGREL 75 MG: 75 TABLET, FILM COATED ORAL at 09:05

## 2021-02-10 RX ADMIN — GUAIFENESIN 600 MG: 600 TABLET, EXTENDED RELEASE ORAL at 20:16

## 2021-02-10 RX ADMIN — GUAIFENESIN 600 MG: 600 TABLET, EXTENDED RELEASE ORAL at 09:05

## 2021-02-10 RX ADMIN — FOLIC ACID 1000 MCG: 1 TABLET ORAL at 09:06

## 2021-02-10 RX ADMIN — TRAMADOL HYDROCHLORIDE 50 MG: 50 TABLET, FILM COATED ORAL at 21:49

## 2021-02-10 RX ADMIN — PANTOPRAZOLE SODIUM 40 MG: 40 TABLET, DELAYED RELEASE ORAL at 09:13

## 2021-02-10 RX ADMIN — TRAMADOL HYDROCHLORIDE 50 MG: 50 TABLET, FILM COATED ORAL at 11:58

## 2021-02-10 RX ADMIN — TRAMADOL HYDROCHLORIDE 50 MG: 50 TABLET, FILM COATED ORAL at 04:39

## 2021-02-10 RX ADMIN — SODIUM CHLORIDE, PRESERVATIVE FREE 10 ML: 5 INJECTION INTRAVENOUS at 09:06

## 2021-02-10 RX ADMIN — SODIUM CHLORIDE 75 ML/HR: 9 INJECTION, SOLUTION INTRAVENOUS at 11:58

## 2021-02-10 RX ADMIN — VANCOMYCIN HYDROCHLORIDE 1000 MG: 1 INJECTION, SOLUTION INTRAVENOUS at 20:16

## 2021-02-10 RX ADMIN — CARVEDILOL 12.5 MG: 6.25 TABLET, FILM COATED ORAL at 09:05

## 2021-02-10 RX ADMIN — AZTREONAM 2 G: 2 INJECTION, POWDER, LYOPHILIZED, FOR SOLUTION INTRAMUSCULAR; INTRAVENOUS at 14:08

## 2021-02-10 RX ADMIN — VANCOMYCIN HYDROCHLORIDE 1000 MG: 1 INJECTION, SOLUTION INTRAVENOUS at 09:04

## 2021-02-10 RX ADMIN — BUDESONIDE 0.5 MG: 0.5 INHALANT RESPIRATORY (INHALATION) at 08:15

## 2021-02-10 RX ADMIN — AZTREONAM 2 G: 2 INJECTION, POWDER, LYOPHILIZED, FOR SOLUTION INTRAMUSCULAR; INTRAVENOUS at 21:56

## 2021-02-10 RX ADMIN — ISOSORBIDE MONONITRATE 60 MG: 60 TABLET, EXTENDED RELEASE ORAL at 09:06

## 2021-02-10 RX ADMIN — VALACYCLOVIR 500 MG: 500 TABLET, FILM COATED ORAL at 09:06

## 2021-02-10 RX ADMIN — LEVOTHYROXINE SODIUM 75 MCG: 75 TABLET ORAL at 04:39

## 2021-02-10 RX ADMIN — AZTREONAM 2 G: 2 INJECTION, POWDER, LYOPHILIZED, FOR SOLUTION INTRAMUSCULAR; INTRAVENOUS at 04:38

## 2021-02-10 RX ADMIN — PREDNISONE 5 MG: 5 TABLET ORAL at 09:06

## 2021-02-10 NOTE — THERAPY RE-EVALUATION
Patient Name: Tawny Shin  : 1955    MRN: [id number]                              Today's Date: 2/10/2021       Admit Date: 2021    Visit Dx:     ICD-10-CM ICD-9-CM   1. Altered mental status, unspecified altered mental status type  R41.82 780.97   2. Syncope, unspecified syncope type  R55 780.2   3. Impaired mobility  Z74.09 799.89   4. Abscess  L02.91 682.9   5. Right hip pain  M25.551 719.45     Patient Active Problem List   Diagnosis   • Eustachian tube dysfunction   • Sensorineural hearing loss   • Lumbago of multiple sites in spine with sciatica   • Spondylolisthesis of lumbar region   • Coronary artery disease   • Hyperlipidemia   • Hypertension   • Myalgia due to statin   • S/P coronary artery stent placement   • Pacemaker   • Seropositive rheumatoid arthritis of multiple sites (CMS/HCC)   • Sick sinus syndrome (CMS/HCC)   • Other osteoporosis without current pathological fracture   • Allergic-infective asthma   • Arthritis of both knees   • Vasovagal syncope   • Bilateral bunions   • Bronchitis   • Cardiac pacemaker syndrome   • Charcot's joint of foot, left   • Constipation   • 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   • Intrinsic asthma   • Left carotid bruit   • Neutropenia (CMS/[facility])   • Obesity   • Primary osteoarthritis of left knee   • Psoriasis vulgaris   • Recurrent acute serous otitis media of both ears   • S/P lumbar laminectomy   • Thrombocytopenia (CMS/HCC)   • Hypothyroidism   • Vitamin D deficiency   • Myxedema heart disease   • Spondylolisthesis of lumbar region   • Syncope, cardiogenic   • Rupture of gluteus minimus tendon   • Muscle tear   • Syncope, recurrent   • Leukocytosis   • Headache   • Elevated CPK   • Staphylococcus aureus bacteremia   • Right hip pain, suspected infection   • Obesity (BMI 30-39.9)     Past Medical History:   Diagnosis Date   • Arthritis    • Asthma    • Chronic sinusitis    • Coronary artery disease    • Eustachian tube dysfunction    • Heart disease    •  Herpes simplex    • Hyperlipidemia    • Hypertension    • Knee dislocation    • Labral tear of right hip joint    • Laryngitis sicca    • Laryngitis, chronic    • MI (myocardial infarction) (CMS/[facility])    • Otorrhea    • Sensorineural hearing loss    • Sjogren's disease (CMS/[facility])      Past Surgical History:   Procedure Laterality Date   • A-V CARDIAC PACEMAKER INSERTION  2016   • ATRIAL CARDIAC PACEMAKER INSERTION     • CARDIAC CATHETERIZATION     • CATARACT EXTRACTION     • COLONOSCOPY  11/08/2011    One fold in the ascending colon which showed ulcer otherwise normal exam   • COLONOSCOPY  11/12/2004    Normal exam repeat in five years   • CORONARY ANGIOPLASTY WITH STENT PLACEMENT      X 2; 2013 & 2014   • ENDOSCOPY  07/10/2014    Normal exam   • HIP ABDUCTION TENOTOMY BILATERAL Right 1/14/2021    Procedure: RIGHT HIP GLUTEUS MEDLUS / MINIMUS REPAIR, POSSIBLE ACHILLES ALLOGRAFT;  Surgeon: Nino Carlson MD;  Location:  [facility] OR;  Service: Orthopedics;  Laterality: Right;   • INCISION AND DRAINAGE HIP Right 2/9/2021    Procedure: HIP INCISION AND DRAINAGE;  Surgeon: Nino Carlson MD;  Location:  [facility];  Service: Orthopedics;  Laterality: Right;   • JOINT REPLACEMENT     • LUMBAR FUSION N/A 1/19/2018    Procedure: L3-4,L4-5 DECOMPRESSION, POSTERIOR SPINAL FUSION WITH INSTRUMENTATION;  Surgeon: Fortino Oropeza MD;  Location:  [facility];  Service:    • LUMBAR LAMINECTOMY WITH FUSION Left 1/17/2018    Procedure: LEFT L3-4 L4-5 LATERAL LUMBAR INTERBODY FUSION;  Surgeon: Fortino Oropeza MD;  Location:  [facility];  Service:    • MYRINGOTOMY W/ TUBES  09/04/2014    TUBES NO LONGER IN PLACE   • OTHER SURGICAL HISTORY      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 so hardware was removed and spacers were placed   • REPLACEMENT TOTAL KNEE Right      General Information     Row Name 02/10/21 1320          Physical Therapy Time and Intention    Document Type  re-evaluation;therapy note (daily note);wound care see MAR  -JE     Row Name  02/10/21 1320          General Information    Prior Level of Function  independent:;all household mobility;community mobility;gait;transfer;bed mobility;ADL's has shower chair and grab bars; using rwx; s/p post hip glut med/min repair  -MAYE     Barriers to Rehab  previous functional deficit  -MAYE     Row Name 02/10/21 1320          Living Environment    Lives With  alone  -MAYE     Row Name 02/10/21 1320          Home Main Entrance    Number of Stairs, Main Entrance  one  -JE     Stair Railings, Main Entrance  none  -     Row Name 02/10/21 1320          Safety Issues, Functional Mobility    Safety Issues Affecting Function (Mobility)  friction/shear risk;safety precaution awareness  -       User Key  (r) = Recorded By, (t) = Taken By, (c) = Cosigned By    Initials Name Provider Type    Anupama Miranda, PT Physical Therapist            PT Assessment (last 12 hours)      PT Evaluation and Treatment     Row Name 02/10/21 1320          Physical Therapy Time and Intention    Subjective Information  complains of;pain  -MAYE     Mode of Treatment  physical therapy  -JE     Patient Effort  excellent  -MAYE     Symptoms Noted During/After Treatment  increased pain  -MAYE     Comment  discussion w/ VIRGIL Hernandez re: wound VAC drsg change schedule who advised to achieve M, W, F rotation and begin drsg change this date  -     Row Name 02/10/21 1320          General Information    Patient Profile Reviewed  yes  -MAYE     Onset of Illness/Injury or Date of Surgery  02/08/21  -MAYE     Referring Physician  Aldo  -MAYE     Patient Observations  alert;cooperative;agree to therapy  -MAYE     Patient/Family/Caregiver Comments/Observations  dtr was in/out of room w/ pt during visit and very supportive  -MAYE     Pertinent History of Current Functional Problem  s/p I & D R hip incision 2/9/21  -MAYE     Existing Precautions/Restrictions  fall;hip, posterior;weight bearing;other (see comments) TTWB R LE  -MAYE     Limitations/Impairments  other (see  comments) pain  -     Risks Reviewed  patient:;increased discomfort  -     Benefits Reviewed  patient:;decrease pain;improve skin integrity;increase knowledge;other (comment);improve function;increase independence improve wound healing  -     Row Name 02/10/21 1320          Cognition    Affect/Mental Status (Cognitive)  WNL  -     Orientation Status (Cognition)  oriented x 4  -     Follows Commands (Cognition)  WNL  -     Personal Safety Interventions  fall prevention program maintained;gait belt;nonskid shoes/slippers when out of bed;supervised activity  -     Row Name 02/10/21 1320          Pain    Additional Documentation  Pain Scale: Numbers Pre/Post-Treatment (Group)  -[facility] Name 02/10/21 1320          Pain Scale: Numbers Pre/Post-Treatment    Pretreatment Pain Rating  2/10  -     Posttreatment Pain Rating  8/10  -     Pain Location - Side  Right  -     Pain Location  hip  -     Pre/Posttreatment Pain Comment  pain meds had been provided prior to tx  -[facility] Name 02/10/21 1320          Mobility    Extremity Weight-bearing Status  right lower extremity  -     Right Lower Extremity (Weight-bearing Status)  toe touch weight-bearing (TTWB)  -[facility] Name 02/10/21 1320          Bed Mobility    Bed Mobility  rolling left;supine-sit;sit-supine;scooting/bridging  -     Rolling Left [place] (Bed Mobility)  contact guard;minimum assist (75% patient effort)  -     Scooting/Bridging [place] (Bed Mobility)  supervision;verbal cues  -     Supine-Sit [place] (Bed Mobility)  minimum assist (75% patient effort);verbal cues  -     Sit-Supine [place] (Bed Mobility)  minimum assist (75% patient effort)  -     Assistive Device (Bed Mobility)  bed rails  -[facility] Name 02/10/21 1320          Transfers    Transfers  sit-stand transfer;stand-sit transfer  -     Maintains Weight-bearing Status (Transfers)  able to maintain;verbal cues to maintain  -     Sit-Stand  [place] (Transfers)  contact guard;verbal cues  -     Stand-Sit [place] (Transfers)  contact guard;verbal cues  -     Row Name 02/10/21 1320          Sit-Stand Transfer    Assistive Device (Sit-Stand Transfers)  walker, front-wheeled  -     Row Name 02/10/21 1320          Stand-Sit Transfer    Assistive Device (Stand-Sit Transfers)  walker, front-wheeled  -     Row Name 02/10/21 1320          Gait/Stairs (Locomotion)    Gait/Stairs Locomotion  gait/ambulation independence;gait/ambulation assistive device;distance ambulated;gait pattern;maintains weight-bearing status  -     [place] Level (Gait)  contact guard;verbal cues  -     Assistive Device (Gait)  walker, front-wheeled  -     Distance in Feet (Gait)  15 ft  -     Pattern (Gait)  3-point;step-to  -     Deviations/Abnormal Patterns (Gait)  base of support, wide;gait speed decreased;stride length decreased  -     Bilateral Gait Deviations  forward flexed posture;heel strike decreased  -     Maintains Weight-bearing Status (Gait)  able to maintain;verbal cues to maintain  -     Row Name 02/10/21 1320          Safety Issues, Functional Mobility    Impairments Affecting Function (Mobility)  endurance/activity tolerance;pain;strength  -     Row Name 02/10/21 1320          Balance    Balance Assessment  sitting static balance;sitting dynamic balance;sit to stand dynamic balance;standing static balance  -     Static Sitting Balance  WNL;supported;sitting in chair;WFL;unsupported;sitting, edge of bed  -     Dynamic Sitting Balance  WFL;supported;sitting, edge of bed  -     Static Standing Balance  WFL;supported;standing  -     Dynamic Standing Balance  mild impairment;supported;standing  -     Comment, Balance  uses rwx w/ CGA  -     Row Name 02/10/21 1320          Edema Assessment & Management    Additional Documentation  -- R LE swelling noted throughout  -     Row Name             Wound 01/14/21 0950 Right  anterior hip Incision    Wound - Properties Group Placement Date: 01/14/21  -AS Placement Time: 0950 -AS Side: Right  -AS Orientation: anterior  -AS Location: hip  -AS Primary Wound Type: Incision  -AS    Retired Wound - Properties Group Date first assessed: 01/14/21  -AS Time first assessed: 0950  -AS Side: Right  -AS Location: hip  -AS Primary Wound Type: Incision  -AS    Row Name 02/10/21 1320          Wound 02/09/21 1715 Right hip Incision    Wound - Properties Group Placement Date: 02/09/21  -SH Placement Time: 1715  -SH Present on Hospital Admission: Y  -SH Side: Right  -SH Location: hip  -SH Primary Wound Type: Incision  -SH    Wound Image  -- see media this date  -JE     Dressing Appearance  intact  -JE     Base  moist;bleeding;clean;subcutaneous;pink;yellow  -JE     Yellow (%), Wound Tissue Color  -- no eschar noted  -JE     Periwound  intact;edematous  -JE     Edges  open  -JE     Wound Length (cm)  16.6 cm  -JE     Wound Width (cm)  9 cm  -JE     Wound Depth (cm)  11 cm  -JE     Drainage Characteristics/Odor  sanguineous  -JE     Drainage Amount  small  -JE     Care, Wound  dressing removed;cleansed with;sterile normal saline;moist wound environment maintained  -JE     Dressing Care  dressing removed;dressing applied;skin barrier agent applied;border dressing;foam;other (see comments) 1 piece of surgicel at near wound edge post/inf wound  -JE     Periwound Care  dry periwound area maintained;barrier film applied  -JE     Wound Output (mL)  250  -JE     Retired Wound - Properties Group Date first assessed: 02/09/21  -SH Time first assessed: 1715  -SH Present on Hospital Admission: Y  -SH Side: Right  -SH Location: hip  -SH Primary Wound Type: Incision  -SH    Row Name 02/10/21 1320          NPWT (Negative Pressure Wound Therapy) 02/09/21 1741 right hip    NPWT (Negative Pressure Wound Therapy) - Properties Group Placement Date: 02/09/21  -SH Placement Time: 1741 -SH Location: right hip  -SH Additional  Comments: 1 pc of black sponge  -SH    Therapy Setting  continuous therapy  -     Dressing  foam, black;transparent dressing;other (see comments) 1 piece of surgicel near wound edge, post/inf aspect; 2 foam  -JE     Pressure Setting  125 mmHg  -JE     Sponges Removed  1  -     Finger sweep complete  Yes  -JE     Retired NPWT (Negative Pressure Wound Therapy) - Properties Group Placement Date: 02/09/21  - Placement Time: 1741  - Location: right hip  - Additional Comments: 1 pc of black sponge  -    Row Name 02/10/21 1320          Coping    Observed Emotional State  cooperative  -     Verbalized Emotional State  acceptance  -     Trust Relationship/Rapport  care explained;emotional support provided;empathic listening provided;questions encouraged;questions answered;reassurance provided;thoughts/feelings acknowledged  -     Family/Support Persons  other (see comments) dtr in/out during visit  -     Row Name 02/10/21 1320          Plan of Care Review    Plan of Care Reviewed With  patient  -     Progress  no change  -     Row Name 02/10/21 1320          Physical Therapy Goals    Wound Care Goal Selection (PT)  wound care, PT goal 1;wound care, PT goal 2  -     Problem Specific Goal Selection (PT)  problem specific goal 1, PT;problem specific goal 2, PT  -     Row Name 02/10/21 1320          Wound Care Goal 1 (PT)    Wound Care Goal 1 (PT)  decrease length X width by 1.0 cm each to 15.6 X 8.0 cm  -     Time Frame (Wound Care Goal 1, PT)  long term goal (LTG);10 days  -     Progress/Outcome (Wound Care Goal 1, PT)  goal ongoing  -     Row Name 02/10/21 1320          Wound Care Goal 2 (PT)    Wound Care Goal 2 (PT)  decrease wound depth by 1.0 cm to 10 cm  -     Time Frame (Wound Care Goal 2, PT)  long term goal (LTG);10 days  -     Progress/Outcome (Wound Care Goal 2, PT)  goal ongoing  -     Row Name 02/10/21 1320          Problem Specific Goal 1 (PT)    Problem Specific Goal 1  (PT)  pt w/ 100% granulation tissue in wound bed  -JE     Time Frame (Problem Specific Goal 1, PT)  long-term goal (LTG);other (see comments) 10 days  -JE     Progress/Outcome (Problem Specific Goal 1, PT)  goal ongoing  -JE     Row Name 02/10/21 1320          Problem Specific Goal 2 (PT)    Problem Specific Goal 2 (PT)  pt to demonstrate knowledge and ability to manage home wound VAC unit prior to dc  -JE     Time Frame (Problem Specific Goal 2, PT)  long-term goal (LTG);other (see comments) 10 days  -JE     Progress/Outcome (Problem Specific Goal 2, PT)  goal ongoing  -       User Key  (r) = Recorded By, (t) = Taken By, (c) = Cosigned By    Initials Name Provider Type    AS Miguelito Quintana, RN, BSN Registered Nurse    Anupama Miranda, PT Physical Therapist    Laurie Ritter RN Registered Nurse          Mobility    No documentation.       Obj/Interventions    No documentation.       Goals/Plan     Row Name 02/10/21 1320          Bed Mobility Goal 1 (PT)    Activity/Assistive Device (Bed Mobility Goal 1, PT)  bed mobility activities, all  -JE     [place] Level/Cues Needed (Bed Mobility Goal 1, PT)  independent  -JE     Time Frame (Bed Mobility Goal 1, PT)  long term goal (LTG);10 days  -JE     Progress/Outcomes (Bed Mobility Goal 1, PT)  goal ongoing  -     Row Name 02/10/21 1320          Transfer Goal 1 (PT)    Activity/Assistive Device (Transfer Goal 1, PT)  sit-to-stand/stand-to-sit;bed-to-chair/chair-to-bed;walker, rolling  -JE     [place] Level/Cues Needed (Transfer Goal 1, PT)  standby assist  -JE     Time Frame (Transfer Goal 1, PT)  long term goal (LTG);10 days  -JE     Strategies/Barriers (Transfers Goal 1, PT)  maintaining TTWB RLE  -JE     Progress/Outcome (Transfer Goal 1, PT)  goal revised this date;goal ongoing  -     Row Name 02/10/21 1320          Gait Training Goal 1 (PT)    Activity/Assistive Device (Gait Training Goal 1, PT)  gait (walking locomotion);assistive device  use;decrease fall risk;improve balance and speed;increase endurance/gait distance;maintain weight-bearing status;walker, rolling  -JE     [place] Level (Gait Training Goal 1, PT)  modified independence  -JE     Distance (Gait Training Goal 1, PT)  30-40 ft maintaining TTWB R LE  -JE     Time Frame (Gait Training Goal 1, PT)  long term goal (LTG);10 days  -JE     Progress/Outcome (Gait Training Goal 1, PT)  goal revised this date;goal ongoing  -     Row Name 02/10/21 1320          Stairs Goal 1 (PT)    Activity/Assistive Device (Stairs Goal 1, PT)  ascending stairs;descending stairs;step-to-step;decrease fall risk;improve balance and speed;maintain weight-bearing status;assistive device use;walker, rolling  -     [place] Level/Cues Needed (Stairs Goal 1, PT)  standby assist  -JE     Number of Stairs (Stairs Goal 1, PT)  1  -JE     Time Frame (Stairs Goal 1, PT)  long term goal (LTG);10 days  -JE     Progress/Outcome (Stairs Goal 1, PT)  goal revised this date;goal ongoing  -JE       User Key  (r) = Recorded By, (t) = Taken By, (c) = Cosigned By    Initials Name Provider Type    Anupama Miranda, PT Physical Therapist        Clinical Impression     Row Name 02/10/21 1320          Pain Scale: Numbers Pre/Post-Treatment    Pain Intervention(s)  Medication (See MAR);Repositioned;Rest  -     Row Name 02/10/21 1320          Plan of Care Review    Outcome Summary  PT re-eval completed.  Pt up in chair.  Education re: TTWB R LE.  Pt performed tfer to standing w/ CGA.  Gait ~ 15 ft w/ rwx TTWB R LE to return to bed.  Required min assist to assist LEs back to bed.  Scoots and repostions w/ supervision.  Rolls to L w/ CGA/Min assist.  Orders for Wound Vac dressing change.  Discussed w/ VIRGIL Hernandez who advised to begin dressing changes this date and begin M, W, F dressing change schedule.  Dressing removed w/ small amount of bleeding noted at superior and inferior aspects of wound.  Bleeding controlled  superiorly, however placed 1 piece of surgicel near edge of wound at posterior inferior aspect along wound wall.  Bleeding controlled w/ this.  Border dressing applied at periwound.  2 pieces of foam placed in wound bed and covered w/ transparent dressing.  Seal obtained and NPWT initiated.  Education re: risks, benefits, precautions.  Time for questions allowed and answered.  Pt w/ pain increasing from 2/10 to 8/10 w/ wound care.  RN aware.  Pt left room at end of visit to go for echo.  Will follow daily to continue functional mobility training and to assess for needs to manage wound w/ plan to change dressing again on Friday 2/12/21.  Recommend home w/ assist and HH for wound care and continued skilled therapy.  -MAYE     Row Name 02/10/21 1320          Therapy Assessment/Plan (PT)    Patient/Family Therapy Goals Statement (PT)  decrease pain, improve wound healing  -MAYE     Rehab Potential (PT)  good, to achieve stated therapy goals  -     Criteria for Skilled Interventions Met (PT)  yes;meets criteria;skilled treatment is necessary  -     Predicted Duration of Therapy Intervention (PT)  until discharge or goals achieved  -     Row Name 02/10/21 1320          Vital Signs    O2 Delivery Pre Treatment  room air  -JE     O2 Delivery Intra Treatment  room air  -JE     O2 Delivery Post Treatment  room air  -JE     Pre Patient Position  Sitting  -     Intra Patient Position  Side Lying  -     Post Patient Position  Sitting in transport chair going for echo  -     Row Name 02/10/21 1320          Positioning and Restraints    Pre-Treatment Position  sitting in chair/recliner  -JE     Post Treatment Position  other transport chair going for echo  -JE     Other Position  with other staff  -       User Key  (r) = Recorded By, (t) = Taken By, (c) = Cosigned By    Initials Name Provider Type    Anupama Miranda, PT Physical Therapist        Outcome Measures     Row Name 02/10/21 1320          How much help from  another person do you currently need...    Turning from your back to your side while in flat bed without using bedrails?  3  -JE     Moving from lying on back to sitting on the side of a flat bed without bedrails?  3  -JE     Moving to and from a bed to a chair (including a wheelchair)?  3  -JE     Standing up from a chair using your arms (e.g., wheelchair, bedside chair)?  3  -JE     Climbing 3-5 steps with a railing?  2  -JE     To walk in hospital room?  3  -JE     AM-PAC 6 Clicks Score (PT)  17  -     Row Name 02/10/21 1320          Functional Assessment    Outcome Measure Options  AM-PAC 6 Clicks Basic Mobility (PT)  -       User Key  (r) = Recorded By, (t) = Taken By, (c) = Cosigned By    Initials Name Provider Type    Anupama Miranda, PT Physical Therapist        Physical Therapy Education                 Title: PT OT SLP Therapies (In Progress)     Topic: Physical Therapy (Done)     Point: Mobility training (Done)     Learning Progress Summary           Patient Acceptance, E,TB,D, VU,DU,NR by MAYE at 2/10/2021 1604    Comment: Education re: purpose of PT/use of NPWT for wound mgmt, risks/benefits/precautions; education for TTWB R LE w/ gait using rwx; education to reinforce proper tech w/ bed mob/tfers/gait w/ rwx    Acceptance, E, VU by AB at 2/9/2021 0948    Comment: PT role in care, plan of care, d/c plan, R hip precautions                   Point: Home exercise program (Done)     Learning Progress Summary           Patient Acceptance, E, VU by AB at 2/9/2021 0948    Comment: PT role in care, plan of care, d/c plan, R hip precautions                   Point: Body mechanics (Done)     Learning Progress Summary           Patient Acceptance, E, VU by AB at 2/9/2021 0948    Comment: PT role in care, plan of care, d/c plan, R hip precautions                   Point: Precautions (Done)     Learning Progress Summary           Patient Acceptance, E,TB,D, VU,DU,NR by MAYE at 2/10/2021 1604    Comment:  Education re: purpose of PT/use of NPWT for wound mgmt, risks/benefits/precautions; education for TTWB R LE w/ gait using rwx; education to reinforce proper tech w/ bed mob/tfers/gait w/ rwx    Acceptance, E, VU by AB at 2/9/2021 0948    Comment: PT role in care, plan of care, d/c plan, R hip precautions                               User Key     Initials Effective Dates Name Provider Type Discipline     08/02/18 -  Anupama De La Cruz, PT Physical Therapist PT    AB 12/02/20 -  Familia Ambriz PT Student PT Student PT              PT Recommendation and Plan  Planned Therapy Interventions (PT): balance training, bed mobility training, gait training, home exercise program, patient/family education, ROM (range of motion), stair training, strengthening, transfer training, wound care, other (see comments)(safety/falls prevention, edema/pain mgmt)  Plan of Care Reviewed With: patient  Progress: no change  Outcome Summary: PT re-eval completed.  Pt up in chair.  Education re: TTWB R LE.  Pt performed tfer to standing w/ CGA.  Gait ~ 15 ft w/ rwx TTWB R LE to return to bed.  Required min assist to assist LEs back to bed.  Scoots and repostions w/ supervision.  Rolls to L w/ CGA/Min assist.  Orders for Wound Vac dressing change.  Discussed w/ VIRGIL Hernandez who advised to begin dressing changes this date and begin M, W, F dressing change schedule.  Dressing removed w/ small amount of bleeding noted at superior and inferior aspects of wound.  Bleeding controlled superiorly, however placed 1 piece of surgicel near edge of wound at posterior inferior aspect along wound wall.  Bleeding controlled w/ this.  Border dressing applied at periwound.  2 pieces of foam placed in wound bed and covered w/ transparent dressing.  Seal obtained and NPWT initiated.  Education re: risks, benefits, precautions.  Time for questions allowed and answered.  Pt w/ pain increasing from 2/10 to 8/10 w/ wound care.  RN aware.  Pt left room at end of  visit to go for echo.  Will follow daily to continue functional mobility training and to assess for needs to manage wound w/ plan to change dressing again on Friday 2/12/21.  Recommend home w/ assist and HH for wound care and continued skilled therapy.     Time Calculation:   PT Charges     Row Name 02/10/21 1558             Time Calculation    Start Time  1320  -      Stop Time  1505  -      Time Calculation (min)  105 min  -      PT Received On  02/10/21  -      PT Goal Re-Cert Due Date  02/20/21  -        User Key  (r) = Recorded By, (t) = Taken By, (c) = Cosigned By    Initials Name Provider Type    Anupama Miranda, PT Physical Therapist        Therapy Charges for Today     Code Description Service Date Service Provider Modifiers Qty    38501077731 HC PT RE-EVAL ESTABLISHED PLAN 2 2/10/2021 Anupama De La Cruz, PT GP 1    04239872206 HC GAIT TRAINING EA 15 MIN 2/10/2021 Anupama De La Cruz, PT GP 1    38973186405 HC PT NEG PRESS WOUND OVER 50SQCM DME4 2/10/2021 Anupama De La Cruz, PT GP 1          PT G-Codes  Outcome Measure Options: AM-PAC 6 Clicks Basic Mobility (PT)  AM-PAC 6 Clicks Score (PT): 17  AM-PAC 6 Clicks Score (OT): 21    Anupama De La Cruz PT  2/10/2021

## 2021-02-10 NOTE — PROGRESS NOTES
Continued Stay Note   [facility]     Patient Name: Tawny Shin  MRN: [id number]  Today's Date: 2/10/2021    Admit Date: 2/8/2021    Discharge Plan     Row Name 02/10/21 1605       Plan    Plan Comments  Pt is followed by [facility]. Joy with [facility] aware of pt admit. Noted pt had a wound vac placed in surgery. Please notify  if pt will need a home vac arranged.        Discharge Codes    No documentation.             OSIEL Pizano

## 2021-02-10 NOTE — PLAN OF CARE
Goal Outcome Evaluation:  Plan of Care Reviewed With: patient  Progress: improving  Outcome Summary: fabiola ARREGUIN is now on RA mid 90s. She had c/o pain med prn with relief/rest x2. She is doing rather well, ambulation with asst and is now voiding. She is curious about her POC and longevity of stay and wants to discuss that with Dr. Oliveira today. Safety maintained, education completed and will continue to monitor.

## 2021-02-10 NOTE — PLAN OF CARE
Goal Outcome Evaluation:  Plan of Care Reviewed With: patient  Progress: no change  Outcome Summary: PT re-eval completed.  Pt up in chair.  Education re: TTWB R LE.  Pt performed tfer to standing w/ CGA.  Gait ~ 15 ft w/ rwx TTWB R LE to return to bed.  Required min assist to assist LEs back to bed.  Scoots and repostions w/ supervision.  Rolls to L w/ CGA/Min assist.  Orders for Wound Vac dressing change.  Discussed w/ VIRGIL eHrnandez who advised to begin dressing changes this date and begin M, W, F dressing change schedule.  Dressing removed w/ small amount of bleeding noted at superior and inferior aspects of wound.  Bleeding controlled superiorly, however placed 1 piece of surgicel near edge of wound at posterior inferior aspect along wound wall.  Bleeding controlled w/ this.  Border dressing applied at periwound.  2 pieces of foam placed in wound bed and covered w/ transparent dressing.  Seal obtained and NPWT initiated.  Education re: risks, benefits, precautions.  Time for questions allowed and answered.  Pt w/ pain increasing from 2/10 to 8/10 w/ wound care.  RN aware.  Pt left room at end of visit to go for echo.  Will follow daily to continue functional mobility training and to assess for needs to manage wound w/ plan to change dressing again on Friday 2/12/21.  Recommend home w/ assist and HH for wound care and continued skilled therapy.

## 2021-02-10 NOTE — NURSING NOTE
Back from PACU, wound vac to right thigh in place and on, able to wiggle toes but unable to light leg up off bed at this time. Tolerating ice and drink at this time. SCDs on, A+Ox4. Family in room

## 2021-02-10 NOTE — PROGRESS NOTES
"Pharmacy Dosing Service  Pharmacokinetics  Vancomycin Follow-up Evaluation    Assessment/Action/Plan:  Current Order: Vancomycin 1000 mg IVPB every 12 hours  Current end date:2/15/21  Levels: Ordered, due 1900 2/10/21  Additional antimicrobial agent(s): Aztreonam    Renal function stable.  Continue Vancomycin 1000 mg iv q 12hrs.  Trough level due 1900 2/10/21. Pharmacy will continue to follow daily and adjust dose accordingly.     Subjective:  Tawny Shin is a 65 y.o. female currently on Vancomycin 1000 mg IV every 12 hours for the treatment of SSTI, day 3 of treatment.    AUC Model2 Data:  Regimen: 1000 mg every 12 hours   Start time: 08:00 on 02/09/2021  Exposure target: AUC24 (range)400-600 mg/L.hr  AUC24,ss: 432 mg/L.hr  PAUC*: 58 %  Ctrough,ss: 13.6 mg/L  Pconc*: 20 %  Tox.: 9 %    Objective:  Ht: 170.2 cm (67\"); Wt: 95.1 kg (209 lb 9.6 oz)  Estimated Creatinine Clearance: 80.8 mL/min (by C-G formula based on SCr of 0.59 mg/dL).   Creatinine   Date Value Ref Range Status   02/10/2021 0.59 0.57 - 1.00 mg/dL Final   02/09/2021 0.66 0.57 - 1.00 mg/dL Final   02/08/2021 0.81 0.57 - 1.00 mg/dL Final   07/08/2020 0.90 0.60 - 1.30 mg/dL Final     Comment:     Serial Number: [id number]:  [id number]   09/03/2019 0.7 0.5 - 0.9 mg/dL Final   01/14/2019 0.5 0.5 - 0.9 mg/dL Final   01/07/2019 0.6 0.5 - 0.9 mg/dL Final      Lab Results   Component Value Date    WBC 7.46 02/10/2021    WBC 10.50 02/09/2021    WBC 11.33 (H) 02/08/2021         Lab Results   Component Value Date    VANCOTROUGH 13.9 12/08/2018       Culture Results:  Microbiology Results (last 10 days)       Procedure Component Value - Date/Time    Tissue / Bone Culture - Tissue, Hip, Right [[id number]] Collected: 02/09/21 5741    Lab Status: Preliminary result Specimen: Tissue from Hip, Right Updated: 02/09/21 9774     Gram Stain Rare (1+) WBCs seen      Rare (1+) Gram positive cocci    Body Fluid Culture - Synovial Fluid, Hip, Right [[id number]] Collected: " 02/09/21 0740    Lab Status: Preliminary result Specimen: Synovial Fluid from Hip, Right Updated: 02/09/21 2242     Body Fluid Culture Abnormal Stain     Gram Stain Many (4+) WBCs seen      Rare (1+) Gram positive cocci    Narrative:      4    Wound Culture - Swab, Hip, Right [[id number]]  (Abnormal) Collected: 02/08/21 2018    Lab Status: Preliminary result Specimen: Swab from Hip, Right Updated: 02/09/21 1219     Wound Culture Moderate growth (3+) Gram Negative Bacilli     Gram Stain Rare (1+) WBCs seen      Rare (1+) Gram negative bacilli    Blood Culture - Blood, Wrist, Left [[id number]]  (Abnormal) Collected: 02/08/21 1946    Lab Status: Preliminary result Specimen: Blood from Wrist, Left Updated: 02/10/21 0135     Blood Culture Abnormal Stain     Gram Stain Aerobic Bottle Gram positive cocci      Anaerobic Bottle Gram positive cocci    COVID-19, [facility] IN-HOUSE,NASAL Swab (NO TRANSPORT MEDIA) 2 HR TAT - Swab, Nasopharynx [[id number]]  (Normal) Collected: 02/08/21 1945    Lab Status: Final result Specimen: Swab from Nasopharynx Updated: 02/08/21 2038     COVID19 Presumptive Negative    Narrative:      Fact sheet for providers: https://www.fda.gov/media/865130/download     Fact sheet for patients: https://www.fda.gov/media/099208/download    Test performed by PCR.  If inconsistent with clinical signs and symptoms patient should be tested with different authorized molecular test.    Blood Culture - Blood, Arm, Right [[id number]]  (Abnormal) Collected: 02/08/21 1936    Lab Status: Preliminary result Specimen: Blood from Arm, Right Updated: 02/10/21 0727     Blood Culture Staphylococcus aureus     Comment: Infectious disease consultation is highly recommended to rule out distant foci of infection.        Isolated from Aerobic and Anaerobic Bottles     Gram Stain Aerobic Bottle Gram positive cocci in clusters      Anaerobic Bottle Gram positive cocci in clusters    Blood Culture ID, PCR - Blood, Arm, Right [[id number]]   (Abnormal) Collected: 02/08/21 1936    Lab Status: Final result Specimen: Blood from Arm, Right Updated: 02/09/21 1643     BCID, PCR Staphylococcus aureus. Identification by BCID PCR.            KARUNA Bee RPH   02/10/21 08:20 CST

## 2021-02-10 NOTE — PROGRESS NOTES
Infectious Diseases Progress Note    Patient:  Tawny Shin  YOB: 1955  MRN: [id number]   Admit date: 2/8/2021   Admitting Physician: Moises Oliveira MD  Primary Care Physician: Davon Urrutia MD    Chief Complaint/Interval History: Had surgical exploration and debridement yesterday.  Has a very large defect right thigh.  Wound VAC in place.  Stable from a cardiopulmonary standpoint at present.  Patient's son, daughter-in-law, daughter, and hospitalist all at bedside.  Talked with all of them at length.  She has had staph bacteremia likely from right hip source.  She appears to be improving rapidly which would be evidence against a sustained bloodstream infection.  She has however been on immunosuppressive treatment for rheumatoid arthritis and also has an indwelling pacemaker in place.  Explained there is some risk of seeding of the pacemaker lead even if echocardiographic evidence does not show vegetation.    Intake/Output Summary (Last 24 hours) at 2/10/2021 1542  Last data filed at 2/10/2021 1415  Gross per 24 hour   Intake 2446.25 ml   Output 1475 ml   Net 971.25 ml     Allergies:   Allergies   Allergen Reactions   • Atorvastatin Other (See Comments)     LEG CRAMPS     • Amoxicillin Rash   • Escitalopram Unknown - Low Severity   • Nabumetone Rash   • Niacin Er Rash   • Penicillins Rash   • Simvastatin Rash     Current Scheduled Medications:   aspirin, 81 mg, Oral, Daily  aztreonam, 2 g, Intravenous, Q8H  budesonide, 0.5 mg, Nebulization, BID - RT  carvedilol, 12.5 mg, Oral, BID With Meals  clopidogrel, 75 mg, Oral, Daily  folic acid, 1,000 mcg, Oral, Daily  guaiFENesin, 600 mg, Oral, Q12H  isosorbide mononitrate, 60 mg, Oral, BID  levothyroxine, 75 mcg, Oral, Q AM  pantoprazole, 40 mg, Oral, Daily  predniSONE, 5 mg, Oral, Daily  sodium chloride, 10 mL, Intravenous, Q12H  valACYclovir, 500 mg, Oral, Daily  vancomycin, 1,000 mg, Intravenous, Q12H      Current PRN Medications:  •   "acetaminophen **OR** acetaminophen **OR** acetaminophen  •  nitroglycerin  •  ondansetron **OR** ondansetron  •  [COMPLETED] Insert peripheral IV **AND** sodium chloride  •  sodium chloride  •  traMADol    Review of Systems no new symptoms.    Vital Signs:  /55   Pulse 79   Temp 97.9 °F (36.6 °C) (Oral)   Resp 18   Ht 170.2 cm (67.01\")   Wt 94.8 kg (209 lb)   SpO2 95%   BMI 32.73 kg/m²     Physical Exam  Vital signs - reviewed.  Line/IV (peripheral) site - No erythema, warmth, induration, or tenderness.  Pacer site left upper chest without erythema or swelling  Heart without murmur  Abdomen soft and nontender  Right hip with wound VAC in place.  No surrounding erythema or induration    Lab Results:  CBC:   Results from last 7 days   Lab Units 02/10/21  0445 02/09/21  0426 02/08/21  1936   WBC 10*3/mm3 7.46 10.50 11.33*   HEMOGLOBIN g/dL 9.1* 10.6* 12.4   HEMATOCRIT % 29.0* 33.5* 37.1   PLATELETS 10*3/mm3 136* 166 191     BMP:  Results from last 7 days   Lab Units 02/10/21  0445 02/09/21  0426 02/08/21  1936   SODIUM mmol/L 135* 137 136   POTASSIUM mmol/L 3.8 3.6 4.0   CHLORIDE mmol/L 103 105 98   CO2 mmol/L 24.0 24.0 28.0   BUN mg/dL 17 16 17   CREATININE mg/dL 0.59 0.66 0.81   GLUCOSE mg/dL 83 109* 114*   CALCIUM mg/dL 8.7 8.7 9.3   ALT (SGPT) U/L 17  --  13     Culture Results:   Blood Culture   Date Value Ref Range Status   02/09/2021 Abnormal Stain (C)  Preliminary   02/08/2021 Staphylococcus aureus (C)  Preliminary     Comment:     Infectious disease consultation is highly recommended to rule out distant foci of infection.   02/08/2021 Staphylococcus aureus (C)  Preliminary     Comment:     Infectious disease consultation is highly recommended to rule out distant foci of infection.     Wound Culture   Date Value Ref Range Status   02/08/2021 Moderate growth (3+) Escherichia coli (A)  Preliminary   02/08/2021 Light growth (2+) Staphylococcus aureus (A)  Preliminary     Susceptibility     Escherichia " coli     LICHA     Ampicillin >=32 ug/ml Resistant     Ampicillin + Sulbactam >=32 ug/ml Resistant     Cefepime <=1 ug/ml Susceptible     Ceftazidime <=1 ug/ml Susceptible     Ceftriaxone <=1 ug/ml Susceptible     Gentamicin <=1 ug/ml Susceptible     Levofloxacin <=0.12 ug/ml Susceptible     Piperacillin + Tazobactam <=4 ug/ml Susceptible     Tetracycline <=1 ug/ml Susceptible     Trimethoprim + Sulfamethoxazole <=20 ug/ml Susceptible      Radiology: None  Additional Studies Reviewed: None    Impression:   1.  Postoperative infection right hip.  Staph aureus the primary pathogen.  E. coli also recovered from culture.  2.  Bloodstream infection with Staph aureus.  3.  Rheumatoid arthritis-on immunosuppression with Cimzia  4.  History of pacemaker insertion    Recommendations:   Await 2D echocardiogram  Follow blood cultures to make sure bacteremia clearing  Planning 4 weeks of vancomycin treatment  Explained to patient family she will need surveillance cultures following completion of her antibiotic treatment as well  Add  aztreonam short course to cover E. Coli (researching whether she has received cephalosporin in the past)  Suspect she would benefit from LTAC for IV antibiotics, wound VAC/wound care, physical therapy, and Occupational Therapy  Discussed with patient, family, and hospitalist  Continue to follow    Elie Ohara MD

## 2021-02-10 NOTE — PROGRESS NOTES
Orthopaedic Surgery Progress Note      2/10/2021   10:32 CST    Name:  Tawny Shin  MRN:    [id number]     Acct:     [id number]   Room:  [address] [place] [address] Day: 1     Admit Date: 2/8/2021  PCP: Davon Urrutia MD        Subjective:     C/C: POD 1 Day Post-Op HIP INCISION AND DRAINAGE (Right). Doing well today.  No new complaints this AM.  Pain well controlled currently.  Sitting in chair comfortably.  Last ESR 21, CRP 15.58.  Wound cultures showing E. Coli and Staph aureus. Blood cultures previously with staph aureus. Being seen and managed by Dr. Copeland.       Interval History: Status: remained stable    Pain: improved and remain unchanged       Medications:     Allergies:   Allergies   Allergen Reactions   • Atorvastatin Other (See Comments)     LEG CRAMPS     • Amoxicillin Rash   • Escitalopram Unknown - Low Severity   • Nabumetone Rash   • Niacin Er Rash   • Penicillins Rash   • Simvastatin Rash       Current Meds:   Current Facility-Administered Medications   Medication Dose Route Frequency Provider Last Rate Last Admin   • acetaminophen (TYLENOL) tablet 650 mg  650 mg Oral Q4H PRN Nino Carlson MD        Or   • acetaminophen (TYLENOL) 160 MG/5ML solution 650 mg  650 mg Oral Q4H PRN Nino Carlson MD        Or   • acetaminophen (TYLENOL) suppository 650 mg  650 mg Rectal Q4H PRN Nino Carlson MD       • aspirin EC tablet 81 mg  81 mg Oral Daily Nino Carlson MD   81 mg at 02/10/21 0905   • aztreonam (AZACTAM) 2 g/100 mL 0.9% NS (mbp)  2 g Intravenous Q8H Nino Carlson MD 0 mL/hr at 02/09/21 1124 2 g at 02/10/21 0438   • budesonide (PULMICORT) nebulizer solution 0.5 mg  0.5 mg Nebulization BID - RT Nino Carlson MD   0.5 mg at 02/10/21 0815   • carvedilol (COREG) tablet 12.5 mg  12.5 mg Oral BID With Meals Nino Carlson MD   12.5 mg at 02/10/21 0905   • clopidogrel (PLAVIX) tablet 75 mg  75 mg Oral Daily Nino Carlson MD   75 mg at 02/10/21 0905   • folic acid (FOLVITE) tablet 1,000 mcg  1,000 mcg Oral Daily Nino Carlson MD    1,000 mcg at 02/10/21 0906   • guaiFENesin (MUCINEX) 12 hr tablet 600 mg  600 mg Oral Q12H Nino Carlson MD   600 mg at 02/10/21 0905   • isosorbide mononitrate (IMDUR) 24 hr tablet 60 mg  60 mg Oral BID Nino Carlson MD   60 mg at 02/10/21 0906   • lactated ringers infusion  100 mL/hr Intravenous Continuous Nino Carlson  mL/hr at 02/09/21 1640 Currently Infusing at 02/09/21 1651   • levothyroxine (SYNTHROID, LEVOTHROID) tablet 75 mcg  75 mcg Oral Q AM Nino Carlson MD   75 mcg at 02/10/21 0439   • nitroglycerin (NITROSTAT) SL tablet 0.4 mg  0.4 mg Sublingual Q5 Min PRN Nino Carlson MD       • ondansetron (ZOFRAN) tablet 4 mg  4 mg Oral Q6H PRN Nino Carlson MD        Or   • ondansetron (ZOFRAN) injection 4 mg  4 mg Intravenous Q6H PRN Nino Carlson MD       • pantoprazole (PROTONIX) EC tablet 40 mg  40 mg Oral Daily Nino Carlson MD   40 mg at 02/10/21 0913   • predniSONE (DELTASONE) tablet 5 mg  5 mg Oral Daily Nino Carlson MD   5 mg at 02/10/21 0906   • sodium chloride 0.9 % flush 10 mL  10 mL Intravenous PRN Nino Carlson MD       • sodium chloride 0.9 % flush 10 mL  10 mL Intravenous Q12H Nino Carlson MD   10 mL at 02/10/21 0906   • sodium chloride 0.9 % flush 10 mL  10 mL Intravenous PRN Nino Carlson MD       • sodium chloride 0.9 % infusion  75 mL/hr Intravenous Continuous Nino Carlson MD 75 mL/hr at 02/09/21 2051 75 mL/hr at 02/09/21 2051   • traMADol (ULTRAM) tablet 50 mg  50 mg Oral Q6H PRN Nino Carlson MD   50 mg at 02/10/21 0439   • valACYclovir (VALTREX) tablet 500 mg  500 mg Oral Daily Nino Carlson MD   500 mg at 02/10/21 0906   • vancomycin (VANCOCIN) in iso-osmotic dextrose IVPB 1 g (premix) 200 mL  1,000 mg Intravenous Q12H Nino Carlson MD 0 mL/hr at 02/09/21 2055 1,000 mg at 02/10/21 0904           Data:     Code Status:    Code Status and Medical Interventions:   Ordered at: 02/08/21 2320     Level Of Support Discussed With:    Patient     Code Status:    CPR     Medical Interventions (Level of  "Support Prior to Arrest):    Full       Family History   Problem Relation Age of Onset   • Cancer Mother    • Heart disease Father        Social History     Socioeconomic History   • Marital status:      Spouse name: Not on file   • Number of children: Not on file   • Years of education: Not on file   • Highest education level: Not on file   Tobacco Use   • Smoking status: Never Smoker   • Smokeless tobacco: Never Used   Substance and Sexual Activity   • Alcohol use: No   • Drug use: Never   • Sexual activity: Defer       Vitals:  /77 (BP Location: Left arm, Patient Position: Lying)   Pulse 78   Temp 98.1 °F (36.7 °C) (Oral)   Resp 16   Ht 170.2 cm (67\")   Wt 95.1 kg (209 lb 9.6 oz)   SpO2 96%   BMI 32.83 kg/m²   Temp (24hrs), Av.6 °F (37 °C), Min:97.9 °F (36.6 °C), Max:99.7 °F (37.6 °C)      I/O (24Hr):    Intake/Output Summary (Last 24 hours) at 2/10/2021 1032  Last data filed at 2/10/2021 0904  Gross per 24 hour   Intake 450 ml   Output 1175 ml   Net -725 ml       Labs:  Lab Results (last 72 hours)     Procedure Component Value Units Date/Time    Wound Culture - Swab, Hip, Right [[id number]]  (Abnormal)  (Susceptibility) Collected: 21    Specimen: Swab from Hip, Right Updated: 02/10/21 0857     Wound Culture Moderate growth (3+) Escherichia coli      Light growth (2+) Staphylococcus aureus     Gram Stain Rare (1+) WBCs seen      Rare (1+) Gram negative bacilli    Susceptibility      Escherichia coli     LICHA     Ampicillin Resistant     Ampicillin + Sulbactam Resistant     Cefepime Susceptible     Ceftazidime Susceptible     Ceftriaxone Susceptible     Gentamicin Susceptible     Levofloxacin Susceptible     Piperacillin + Tazobactam Susceptible     Tetracycline Susceptible     Trimethoprim + Sulfamethoxazole Susceptible                Susceptibility Comments     Escherichia coli    Cefazolin sensitivity will not be reported for Enterobacteriaceae in non-urine isolates. If " cefazolin is preferred, please call the microbiology lab to request an E-test.  With the exception of urinary-sourced infections, aminoglycosides should not be used as monotherapy.             Blood Culture - Blood, Arm, Right [[id number]]  (Abnormal) Collected: 02/08/21 1936    Specimen: Blood from Arm, Right Updated: 02/10/21 0727     Blood Culture Staphylococcus aureus     Comment: Infectious disease consultation is highly recommended to rule out distant foci of infection.        Isolated from Aerobic and Anaerobic Bottles     Gram Stain Aerobic Bottle Gram positive cocci in clusters      Anaerobic Bottle Gram positive cocci in clusters    Blood Culture With BREONNA - Blood, Arm, Left [[id number]] Collected: 02/10/21 0445    Specimen: Blood from Arm, Left Updated: 02/10/21 0654    Blood Culture With BREONNA - Blood, Arm, Right [[id number]] Collected: 02/10/21 0445    Specimen: Blood from Arm, Right Updated: 02/10/21 0654    Comprehensive Metabolic Panel [[id number]]  (Abnormal) Collected: 02/10/21 0445    Specimen: Blood Updated: 02/10/21 0608     Glucose 83 mg/dL      BUN 17 mg/dL      Creatinine 0.59 mg/dL      Sodium 135 mmol/L      Potassium 3.8 mmol/L      Chloride 103 mmol/L      CO2 24.0 mmol/L      Calcium 8.7 mg/dL      Total Protein 5.7 g/dL      Albumin 2.50 g/dL      ALT (SGPT) 17 U/L      AST (SGOT) 77 U/L      Alkaline Phosphatase 69 U/L      Total Bilirubin 0.7 mg/dL      eGFR Non African Amer 102 mL/min/1.73      Globulin 3.2 gm/dL      A/G Ratio 0.8 g/dL      BUN/Creatinine Ratio 28.8     Anion Gap 8.0 mmol/L     Narrative:      GFR Normal >60  Chronic Kidney Disease <60  Kidney Failure <15      CBC (No Diff) [[id number]]  (Abnormal) Collected: 02/10/21 0445    Specimen: Blood Updated: 02/10/21 0541     WBC 7.46 10*3/mm3      RBC 3.15 10*6/mm3      Hemoglobin 9.1 g/dL      Hematocrit 29.0 %      MCV 92.1 fL      MCH 28.9 pg      MCHC 31.4 g/dL      RDW 15.0 %      RDW-SD 51.0 fl      MPV 10.8 fL       Platelets 136 10*3/mm3     Blood Culture - Blood, Wrist, Left [[id number]]  (Abnormal) Collected: 02/08/21 1946    Specimen: Blood from Wrist, Left Updated: 02/10/21 0135     Blood Culture Abnormal Stain     Gram Stain Aerobic Bottle Gram positive cocci      Anaerobic Bottle Gram positive cocci    Tissue / Bone Culture - Tissue, Hip, Right [[id number]] Collected: 02/09/21 1745    Specimen: Tissue from Hip, Right Updated: 02/09/21 2251     Gram Stain Rare (1+) WBCs seen      Rare (1+) Gram positive cocci    Body Fluid Culture - Synovial Fluid, Hip, Right [[id number]] Collected: 02/09/21 0740    Specimen: Synovial Fluid from Hip, Right Updated: 02/09/21 2242     Body Fluid Culture Abnormal Stain     Gram Stain Many (4+) WBCs seen      Rare (1+) Gram positive cocci    Narrative:      4    Blood Culture - Blood, Blood, Venous Line [[id number]] Collected: 02/09/21 1736    Specimen: Blood, Venous Line Updated: 02/09/21 1746    Blood Culture ID, PCR - Blood, Arm, Right [[id number]]  (Abnormal) Collected: 02/08/21 1936    Specimen: Blood from Arm, Right Updated: 02/09/21 1643     BCID, PCR Staphylococcus aureus. Identification by BCID PCR.    Lipid Panel [[id number]]  (Abnormal) Collected: 02/09/21 0426    Specimen: Blood Updated: 02/09/21 0556     Total Cholesterol 129 mg/dL      Triglycerides 108 mg/dL      HDL Cholesterol 36 mg/dL      LDL Cholesterol  73 mg/dL      VLDL Cholesterol 20 mg/dL      LDL/HDL Ratio 1.98    Narrative:      Cholesterol Reference Ranges  (U.S. Department of Health and Human Services ATP III Classifications)    Desirable          <200 mg/dL  Borderline High    200-239 mg/dL  High Risk          >240 mg/dL      Triglyceride Reference Ranges  (U.S. Department of Health and Human Services ATP III Classifications)    Normal           <150 mg/dL  Borderline High  150-199 mg/dL  High             200-499 mg/dL  Very High        >500 mg/dL    HDL Reference Ranges  (U.S. Department of Health and Human Services  ATP III Classifcations)    Low     <40 mg/dl (major risk factor for CHD)  High    >60 mg/dl ('negative' risk factor for CHD)        LDL Reference Ranges  (U.S. Department of Health and Human Services ATP III Classifcations)    Optimal          <100 mg/dL  Near Optimal     100-129 mg/dL  Borderline High  130-159 mg/dL  High             160-189 mg/dL  Very High        >189 mg/dL    Hemoglobin A1c [[id number]]  (Normal) Collected: 02/09/21 0426    Specimen: Blood Updated: 02/09/21 0535     Hemoglobin A1C 5.50 %     Narrative:      Hemoglobin A1C Ranges:    Increased Risk for Diabetes  5.7% to 6.4%  Diabetes                     >= 6.5%  Diabetic Goal                < 7.0%    Basic Metabolic Panel [[id number]]  (Abnormal) Collected: 02/09/21 0426    Specimen: Blood Updated: 02/09/21 0535     Glucose 109 mg/dL      BUN 16 mg/dL      Creatinine 0.66 mg/dL      Sodium 137 mmol/L      Potassium 3.6 mmol/L      Chloride 105 mmol/L      CO2 24.0 mmol/L      Calcium 8.7 mg/dL      eGFR Non African Amer 89 mL/min/1.73      BUN/Creatinine Ratio 24.2     Anion Gap 8.0 mmol/L     Narrative:      GFR Normal >60  Chronic Kidney Disease <60  Kidney Failure <15      C-reactive Protein [[id number]]  (Abnormal) Collected: 02/09/21 0426    Specimen: Blood Updated: 02/09/21 0535     C-Reactive Protein 15.58 mg/dL     Sedimentation Rate [[id number]]  (Abnormal) Collected: 02/09/21 0426    Specimen: Blood Updated: 02/09/21 0518     Sed Rate 21 mm/hr     CBC (No Diff) [[id number]]  (Abnormal) Collected: 02/09/21 0426    Specimen: Blood Updated: 02/09/21 0507     WBC 10.50 10*3/mm3      RBC 3.65 10*6/mm3      Hemoglobin 10.6 g/dL      Hematocrit 33.5 %      MCV 91.8 fL      MCH 29.0 pg      MCHC 31.6 g/dL      RDW 15.3 %      RDW-SD 51.0 fl      MPV 11.3 fL      Platelets 166 10*3/mm3     Urinalysis With Culture If Indicated - Urine, Catheter [[id number]]  (Abnormal) Collected: 02/08/21 2058    Specimen: Urine, Catheter Updated: 02/08/21 2125      Color, UA Yellow     Appearance, UA Clear     pH, UA 5.5     Specific Gravity, UA 1.020     Glucose, UA Negative     Ketones, UA 15 mg/dL (1+)     Bilirubin, UA Negative     Blood, UA Negative     Protein, UA Trace     Leuk Esterase, UA Negative     Nitrite, UA Negative     Urobilinogen, UA 0.2 E.U./dL    Narrative:      Urine microscopic not indicated.    [place] Draw [[id number]] Collected: 02/08/21 1936    Specimen: Blood Updated: 02/08/21 2101    Narrative:      The following orders were created for panel order [place] Draw.  Procedure                               Abnormality         Status                     ---------                               -----------         ------                     Light Blue Top[[id number]]                                   Final result               Green Top (Gel)[[id number]]                                  Final result               Lavender Top[[id number]]                                     Final result               Red Top[[id number]]                                          Final result               [place] Blood Culture Jacob...[[id number]]                      Final result               Gray Top - Ice[[id number]]                                   Final result                 Please view results for these tests on the individual orders.    [place] Blood Culture Bottle Set [[id number]] Collected: 02/08/21 1946    Specimen: Blood from Wrist, Left Updated: 02/08/21 2101     Extra Tube Hold for add-ons.     Comment: Auto resulted.       Light Blue Top [[id number]] Collected: 02/08/21 1936    Specimen: Blood from Arm, Right Updated: 02/08/21 2046     Extra Tube hold for add-on     Comment: Auto resulted       Green Top (Gel) [[id number]] Collected: 02/08/21 1936    Specimen: Blood from Arm, Right Updated: 02/08/21 2046     Extra Tube Hold for add-ons.     Comment: Auto resulted.       Lavender Top [[id number]] Collected: 02/08/21 1936    Specimen: Blood from Arm, Right Updated: 02/08/21  2046     Extra Tube hold for add-on     Comment: Auto resulted       Red Top [[id number]] Collected: 02/08/21 1936    Specimen: Blood from Arm, Right Updated: 02/08/21 2046     Extra Tube Hold for add-ons.     Comment: Auto resulted.       Gray Top - Ice [[id number]] Collected: 02/08/21 1936    Specimen: Blood from Arm, Right Updated: 02/08/21 2046     Extra Tube Hold for add-ons.     Comment: Auto resulted.       COVID-19, [facility] IN-HOUSE,NASAL Swab (NO TRANSPORT MEDIA) 2 HR TAT - Swab, Nasopharynx [[id number]]  (Normal) Collected: 02/08/21 1945    Specimen: Swab from Nasopharynx Updated: 02/08/21 2038     COVID19 Presumptive Negative    Narrative:      Fact sheet for providers: https://www.fda.gov/media/031209/download     Fact sheet for patients: https://www.fda.gov/media/864293/download    Test performed by PCR.  If inconsistent with clinical signs and symptoms patient should be tested with different authorized molecular test.    D-dimer, Quantitative [[id number]]  (Abnormal) Collected: 02/08/21 1936    Specimen: Blood from Arm, Right Updated: 02/08/21 2033     D-Dimer, Quantitative 13.53 mg/L (FEU)     Narrative:      Reference Range is 0-0.50 mg/L FEU. However, results <0.50 mg/L FEU tends to rule out DVT or PE. Results >0.50 mg/L FEU are not useful in predicting absence or presence of DVT or PE.      CK [[id number]]  (Abnormal) Collected: 02/08/21 1936    Specimen: Blood from Arm, Right Updated: 02/08/21 2025     Creatine Kinase 324 U/L     Procalcitonin [[id number]]  (Abnormal) Collected: 02/08/21 1936    Specimen: Blood from Arm, Right Updated: 02/08/21 2012     Procalcitonin 0.86 ng/mL     Narrative:      As a Marker for Sepsis (Non-Neonates):   1. <0.5 ng/mL represents a low risk of severe sepsis and/or septic shock.  1. >2 ng/mL represents a high risk of severe sepsis and/or septic shock.    As a Marker for Lower Respiratory Tract Infections that require antibiotic therapy:  PCT on Admission     Antibiotic  "Therapy             6-12 Hrs later  > 0.5                Strongly Recommended            >0.25 - <0.5         Recommended  0.1 - 0.25           Discouraged                   Remeasure/reassess PCT  <0.1                 Strongly Discouraged          Remeasure/reassess PCT      As 28 day mortality risk marker: \"Change in Procalcitonin Result\" (> 80 % or <=80 %) if Day 0 (or Day 1) and Day 4 values are available. Refer to http://www.[facility]-pct-calculator.com/   Change in PCT <=80 %   A decrease of PCT levels below or equal to 80 % defines a positive change in PCT test result representing a higher risk for 28-day all-cause mortality of patients diagnosed with severe sepsis or septic shock.  Change in PCT > 80 %   A decrease of PCT levels of more than 80 % defines a negative change in PCT result representing a lower risk for 28-day all-cause mortality of patients diagnosed with severe sepsis or septic shock.                Results may be falsely decreased if patient taking Biotin.     Comprehensive Metabolic Panel [[id number]]  (Abnormal) Collected: 02/08/21 1936    Specimen: Blood from Arm, Right Updated: 02/08/21 2006     Glucose 114 mg/dL      BUN 17 mg/dL      Creatinine 0.81 mg/dL      Sodium 136 mmol/L      Potassium 4.0 mmol/L      Chloride 98 mmol/L      CO2 28.0 mmol/L      Calcium 9.3 mg/dL      Total Protein 6.7 g/dL      Albumin 3.60 g/dL      ALT (SGPT) 13 U/L      AST (SGOT) 25 U/L      Alkaline Phosphatase 91 U/L      Total Bilirubin 0.7 mg/dL      eGFR Non African Amer 71 mL/min/1.73      Globulin 3.1 gm/dL      A/G Ratio 1.2 g/dL      BUN/Creatinine Ratio 21.0     Anion Gap 10.0 mmol/L     Narrative:      GFR Normal >60  Chronic Kidney Disease <60  Kidney Failure <15      Lactic Acid, Plasma [[id number]]  (Normal) Collected: 02/08/21 1936    Specimen: Blood from Arm, Right Updated: 02/08/21 2000     Lactate 1.2 mmol/L     CBC & Differential [[id number]]  (Abnormal) Collected: 02/08/21 1936    Specimen: " Blood from Arm, Right Updated: 02/08/21 1947    Narrative:      The following orders were created for panel order CBC & Differential.  Procedure                               Abnormality         Status                     ---------                               -----------         ------                     CBC Auto Differential[[id number]]        Abnormal            Final result                 Please view results for these tests on the individual orders.    CBC Auto Differential [[id number]]  (Abnormal) Collected: 02/08/21 1936    Specimen: Blood from Arm, Right Updated: 02/08/21 1947     WBC 11.33 10*3/mm3      RBC 4.16 10*6/mm3      Hemoglobin 12.4 g/dL      Hematocrit 37.1 %      MCV 89.2 fL      MCH 29.8 pg      MCHC 33.4 g/dL      RDW 15.5 %      RDW-SD 50.1 fl      MPV 10.6 fL      Platelets 191 10*3/mm3      Neutrophil % 75.8 %      Lymphocyte % 12.6 %      Monocyte % 10.4 %      Eosinophil % 0.3 %      Basophil % 0.4 %      Immature Grans % 0.5 %      Neutrophils, Absolute 8.58 10*3/mm3      Lymphocytes, Absolute 1.43 10*3/mm3      Monocytes, Absolute 1.18 10*3/mm3      Eosinophils, Absolute 0.03 10*3/mm3      Basophils, Absolute 0.05 10*3/mm3      Immature Grans, Absolute 0.06 10*3/mm3      nRBC 0.0 /100 WBC               Physical Exam:     General: NAD, cooperative with exam.  AAOx3.   Right Hip: Incision is clean, dry, intact. Dressing is Clean, Dry, Intact.  Wound vac in place with good seal. No surrounding erythema or drainage. Neurovascular intact distally. Moderate tenderness to palpation, soft throughout. No signs or symptoms of DVT or PE.        Assessment:     Primary Problem  Staphylococcus aureus bacteremia  POD 1 Day Post-Op HIP INCISION AND DRAINAGE (Right) with wound vac placement     Plan:     1. Continue PT OT - PWB <50%  2. PT managing wound vac - MWF schedule; discussed with PT who will change out dressing today - 125 mm of continuous suction.  The incision was approximately 20 cm in  length and 6 cm in depth down to the level of the greater trochanter.  3. ID consulted  4. OK for dc when medically appropriate  5. Continue dvt ppx, pain control    Electronically signed by Silverio Parekh PA-C on 2/10/2021 at 10:32 CST

## 2021-02-10 NOTE — ANESTHESIA POSTPROCEDURE EVALUATION
"Patient: Tawny Shin    Procedure Summary     Date: 02/09/21 Room / Location: [facility] OR  /  [facility] OR    Anesthesia Start: 1651 Anesthesia Stop: 1800    Procedure: HIP INCISION AND DRAINAGE (Right Hip) Diagnosis:     Surgeon: Nino Carlson MD Provider: Moises Chavarria CRNA    Anesthesia Type: general ASA Status: 3 - Emergent          Anesthesia Type: general    Vitals  Vitals Value Taken Time   /77 02/09/21 1845   Temp 98.8 °F (37.1 °C) 02/09/21 1835   Pulse 88 02/09/21 1845   Resp 14 02/09/21 1845   SpO2 100 % 02/09/21 1845           Post Anesthesia Care and Evaluation    Patient location during evaluation: PACU  Patient participation: complete - patient participated  Level of consciousness: awake and alert  Pain management: adequate  Airway patency: patent  Anesthetic complications: No anesthetic complications  PONV Status: none  Cardiovascular status: acceptable and hemodynamically stable  Respiratory status: acceptable  Hydration status: acceptable    Comments: Blood pressure 126/47, pulse 79, temperature 98.1 °F (36.7 °C), temperature source Oral, resp. rate 18, height 170.2 cm (67\"), weight 95.1 kg (209 lb 9.6 oz), SpO2 99 %, not currently breastfeeding.    Patient discharged from PACU based upon Abdifatah score. Please see RN notes for further details      "

## 2021-02-10 NOTE — PROGRESS NOTES
[facility] Medicine Services  INPATIENT PROGRESS NOTE    Patient Name: Tawny Shin  Date of Admission: 2/8/2021  Today's Date: 02/09/21  Length of Stay: 0  Primary Care Physician: Davon Urrutia MD    Subjective   Chief Complaint: right hip wound  HPI     Patient seen and examined at bedside.  Right hip continues to hurt.  Patient indicates if she is not moving the pain is controlled.  Patient had blood culture bottles come back positive.    Son, Dr. Casa Shin at bedside.    Questions and addressed.  OR tonight for I&D.        Review of Systems   Constitutional: Positive for activity change and fatigue. Negative for appetite change, chills and fever.   Cardiovascular: Negative for chest pain and palpitations.   Gastrointestinal: Negative for abdominal distention, abdominal pain, constipation, diarrhea, nausea and vomiting.   Musculoskeletal: Positive for arthralgias and myalgias.   Neurological: Positive for syncope and weakness.        All pertinent negatives and positives are as above. All other systems have been reviewed and are negative unless otherwise stated.     Objective    Temp:  [98.1 °F (36.7 °C)-99.7 °F (37.6 °C)] 98.1 °F (36.7 °C)  Heart Rate:  [77-99] 79  Resp:  [12-18] 18  BP: (100-170)/() 100/62  Physical Exam  Vitals signs reviewed.   Constitutional:       Appearance: Normal appearance.   HENT:      Head: Normocephalic and atraumatic.      Mouth/Throat:      Mouth: Mucous membranes are moist.      Pharynx: Oropharynx is clear.   Eyes:      General: No scleral icterus.     Conjunctiva/sclera: Conjunctivae normal.   Cardiovascular:      Rate and Rhythm: Normal rate and regular rhythm.      Heart sounds: Murmur present.   Abdominal:      General: Abdomen is flat. Bowel sounds are normal. There is no distension.      Palpations: Abdomen is soft. There is no mass.      Tenderness: There is no abdominal tenderness.      Hernia: No hernia is present.    Skin:     General: Skin is warm and dry.      Coloration: Skin is pale.      Comments: Right hip dehisence, purulent drainage, area of eschar   Neurological:      General: No focal deficit present.      Mental Status: She is alert and oriented to person, place, and time.   Psychiatric:         Mood and Affect: Mood normal.         Behavior: Behavior normal.             Results Review:  I have reviewed the labs, radiology results, and diagnostic studies.    Laboratory Data:   Results from last 7 days   Lab Units 02/09/21  0426 02/08/21  1936   WBC 10*3/mm3 10.50 11.33*   HEMOGLOBIN g/dL 10.6* 12.4   HEMATOCRIT % 33.5* 37.1   PLATELETS 10*3/mm3 166 191        Results from last 7 days   Lab Units 02/09/21  0426 02/08/21  1936   SODIUM mmol/L 137 136   POTASSIUM mmol/L 3.6 4.0   CHLORIDE mmol/L 105 98   CO2 mmol/L 24.0 28.0   BUN mg/dL 16 17   CREATININE mg/dL 0.66 0.81   CALCIUM mg/dL 8.7 9.3   BILIRUBIN mg/dL  --  0.7   ALK PHOS U/L  --  91   ALT (SGPT) U/L  --  13   AST (SGOT) U/L  --  25   GLUCOSE mg/dL 109* 114*       Culture Data:   Blood Culture   Date Value Ref Range Status   02/08/2021 Abnormal Stain (C)  Preliminary   02/08/2021 Abnormal Stain (C)  Preliminary     Wound Culture   Date Value Ref Range Status   02/08/2021 Moderate growth (3+) Gram Negative Bacilli (A)  Preliminary       Radiology Data:   Imaging Results (Last 24 Hours)     Procedure Component Value Units Date/Time    MRI Hip Right Without Contrast [[id number]] Collected: 02/09/21 1423     Updated: 02/09/21 1430    Narrative:      EXAMINATION: MRI HIP RIGHT WO CONTRAST-     2/9/2021 1:14 PM CST     HISTORY: Soft tissue infection suspected, hip, xray done  Postoperative hip evaluation.     Noncontrast MR imaging of the right hip.     Comparison is made with CT exam performed earlier today and MRI from  12/11/2020.     Postoperative changes with residual fluid collection lateral to the  right hip.     There is extensive edema within the right  gluteus muscle.     Edema within the proximal right femur greater trochanter associated with  gluteus tendon reattachment.     No occult fracture.     Small amount of joint fluid.     Summary:  1. Extensive gluteus muscle edema.  No drainable gluteus muscle fluid collection is seen.  2. Moderate amount of incisional fluid.  This report was finalized on 02/09/2021 14:27 by Dr. Jesus Manuel Santos MD.    MRI Brain Without Contrast [[id number]] Collected: 02/09/21 1331     Updated: 02/09/21 1336    Narrative:      EXAMINATION: MRI BRAIN WO CONTRAST-     2/9/2021 12:50 PM CST     HISTORY: Syncope/fainting; R41.82-Altered mental status, unspecified;  R55-Syncope and collapse; Z74.09-Other reduced mobility     Noncontrast MR imaging of the brain.     No acute infarct.     No brain hemorrhage or abnormal calcification.     Ventricle size is appropriate.  Mild cortical atrophy.     Mild small vessel disease within the periventricular white matter.     Mucosal thickening throughout all paranasal sinuses.  Bilateral mastoid fluid.     Summary:  1. Mild atrophy and small vessel disease.  2. Paranasal sinus inflammatory disease and mastoid fluid.  3. No mass or infarct.  This report was finalized on 02/09/2021 13:33 by Dr. Jesus Manuel Santos MD.    CT Lower Extremity Right Without Contrast [[id number]] Collected: 02/09/21 0844     Updated: 02/09/21 0852    Narrative:      CT LOWER EXTREMITY RIGHT WO CONTRAST- 2/9/2021 8:16 AM CST     HISTORY: Soft tissue infection suspected, hip, xray done; R41.82-Altered  mental status, unspecified; R55-Syncope and collapse      COMPARISON: Plain films 2/8/2021     DOSE LENGTH PRODUCT: 410 mGy cm. Automated exposure control was also  utilized to decrease patient radiation dose.     TECHNIQUE: Axial images of the right hip are obtained without IV  contrast. Sagittal coronal images reformatted.     FINDINGS:  There is straightening of the lateral subcutaneous tissues of  the right hip extending to the  regional musculature with no organized  abscess collection. Enthesophytes project from the anterior iliac spine  and the greater trochanter. There are linear lucencies of the right  proximal femur within the intratrochanteric region which may represent  sites of hardware removal. There is mild hip joint space narrowing with  early bony spurring of the femoral head. No bony fracture or  dislocation. No radiographic evidence of avascular necrosis.     Postoperative changes of the lower lumbar spine. Mild arthritic change  of the right sacroiliac joint.     A normal-appearing appendix is identified. Vascular calcifications.  Calcifications within the uterus may represent small leiomyomas.       Impression:      1. There is stranding of the subcutaneous tissues of the lateral right  hip with no organized abscess collection.  2. Hardware removal suspected from the proximal right femur with no  acute osseous pathology.  This report was finalized on 02/09/2021 08:49 by Dr. Trinity Gómez MD.    XR Hip With or Without Pelvis 2 - 3 View Right [[id number]] Collected: 02/08/21 2254     Updated: 02/08/21 2258    Narrative:      EXAMINATION:  XR HIP W OR WO PELVIS 2-3 VIEW RIGHT-  2/8/2021 8:25 PM  CST     HISTORY: drainage from incision, recent repair      COMPARISON: 10/22/2020 right hip radiographs     TECHNIQUE: 3 views: AP pelvis, AP and lateral right hip     FINDINGS:      The right femoral head is imaged appropriately within the acetabulum.  Contracted the femoral head is maintained without fracture.     The left hip joint is maintained.     The bony pelvis is intact without fracture.     Degenerative changes noted in the lower lumbar spine.       Impression:      1. No acute osseous abnormality.  This report was finalized on 02/08/2021 22:55 by Dr. Sterling Elizalde MD.    CT Lumbar Spine Without Contrast [[id number]] Collected: 02/08/21 2226     Updated: 02/08/21 2239    Narrative:      EXAMINATION:  CT LUMBAR SPINE WO  CONTRAST-  2/8/2021 9:14 PM CST     HISTORY: ams, unwitnessed fall, pain      COMPARISON: Lumbar spine radiographs dated 11/7/2019     TECHNIQUE: Radiation dose equals  mGy-cm.  Automated exposure  control dose reduction technique was implemented.     Thin section axial imaging was obtained without intravenous contrast.  2-D sagittal and coronal reconstruction images were generated.     FINDINGS:      Extensive postsurgical changes from posterior and interbody fusion  identified at L3-L4 and L4-L5. The hardware is imaged appropriately  without screw fracture or hardware complications.     Large laminectomy defect identified at L4.     There is no CT evidence of acute fracture or subluxation.     There is disc degeneration at L2-L3 and L1-L2 with disc space narrowing  and vacuum phenomenon, endplate sclerosis and osteophyte formation.     There is broad-based partially calcified disc at L1-L2 with resulting  canal stenosis. There is facet arthropathy with bilateral foraminal  stenosis particularly on the right     There is mild posterior listhesis of L2 on L3 with posterior osteophyte  and disc complex with relative canal and foraminal narrowing.       Impression:      1. No CT evidence of acute bony injury to the lumbar spine.  2. Extensive postsurgical changes.  3. Advanced disc degeneration and spondylosis at L2-L3 and L1-L2.  This report was finalized on 02/08/2021 22:36 by Dr. Sterling Elizalde MD.    CT Thoracic Spine Without Contrast [[id number]] Collected: 02/08/21 2224     Updated: 02/08/21 2229    Narrative:      EXAMINATION:  CT THORACIC SPINE WO CONTRAST-  2/8/2021 9:14 PM CST     HISTORY: ams, unwitnessed fall      COMPARISON: No comparison study.     TECHNIQUE: Radiation dose equals DLP 1/2/2002 mGy-cm.  Automated  exposure control dose reduction technique was implemented.     Thin section axial imaging was obtained without intravenous contrast.  2-D sagittal and coronal reconstruction images were  generated.     FINDINGS: There is osteoporosis.     There are diffuse spondylosis changes. Bridging anterior osteophyte  formation observed. There is mild endplate irregularities with Schmorl's  node changes in the mid to lower thoracic spine.     There is no CT evidence of acute fracture or subluxation.     There is no significant canal stenosis. There is no CT evidence of  posttraumatic disc herniation.       Impression:      1. No CT evidence of acute bony injury to the thoracic spine.  This report was finalized on 02/08/2021 22:26 by Dr. Sterling Elizalde MD.    XR Chest 1 View [[id number]] Collected: 02/08/21 2202     Updated: 02/08/21 2206    Narrative:      EXAMINATION: XR CHEST 1 VW-. 2/8/2021 10:02 PM CST     CHEST, ONE VIEW:     HISTORY: Altered mental status     COMPARISON: 7/7/2014 chest radiography     A single frontal chest radiograph was obtained.     FINDINGS:     Permanent cardiac pacemaker identified.     Calcified granuloma noted in the right upper lobe.     Mild chronic interstitial prominence identified without parenchymal  infiltrates.     There are no pleural effusions or pneumothoraces.     The heart is normal in size without evidence of overt heart failure.     No acute osseous abnormalities identified.                                     Impression:      1. No acute cardiopulmonary process.     This report was finalized on 02/08/2021 22:03 by Dr. Sterling Elizalde MD.    CT Angiogram Chest [[id number]] Collected: 02/08/21 2153     Updated: 02/08/21 2204    Narrative:      EXAMINATION: CT ANGIOGRAM CHEST-  2/8/2021 9:53 PM CST     HISTORY:Chest pain post fall     COMPARISON: Current chest radiography     TECHNIQUE:     CT evaluation of the chest was performed with intravenous contrast. 2 mm  transaxial images were obtained.. Coronal reconstruction maximum  intensity projection images of the pulmonary arteries and aorta were  also generated.     Radiation dose equals  mGy-cm.  Automated exposure  control dose  reduction technique was implemented.        FINDINGS:     [The pulmonary arteries opacify with iodinated contrast without  intraluminal filling defects. There is no CT angiographic evidence of  pulmonary embolus.     There is no aortic dissection or aneurysm.     There are no pneumothoraces or pleural effusions.     There are no areas of airspace consolidation.     Calcified granuloma noted in the right upper lobe.     There is mild reticular groundglass opacities appreciated in the right  upper lobe anteriorly, nonspecific. Acute infectious or inflammatory  change considered. Some mild pulmonary contusion would be difficult to  exclude, however less likely.     There is mild ground glass opacities posteriorly in the lower lobes  likely representing atelectasis associated with the level of  inspiration.     There is no mediastinal or hilar lymphadenopathy. There are calcified  right hilar lymph nodes.     Coronary artery calcifications observed. Permanent cardiac pacemaker  leads identified.     Limited imaging the upper abdomen demonstrate no acute abnormality.     There is mild deformity of the right fourth rib anteriorly, some old  trauma questioned. Acute bony injury difficult to totally exclude,  however no discrete fracture line observed. Correlate with clinical  findings.     Spondylosis changes observed in the thoracic spine. No acute spine  fracture observed.]       Impression:      1. No CT angiographic evidence of pulmonary embolus or acute aortic  pathology.  2. Mild reticular groundglass reticular opacities in the right upper  lobe, etiology uncertain. Differential considerations includes mild  infectious/inflammatory changes. Mild pulmonary contusion as well as  chronic interstitial lung changes considered. Probable mild atelectasis  in the lower lobes. Lung fields are otherwise clear.  3. Mild deformity right anterior fourth rib, question old trauma.  Correlate with pedicle findings. No  definite acute osseous abnormalities  observed otherwise.  This report was finalized on 02/08/2021 22:01 by Dr. Sterling Elizalde MD.    CT Cervical Spine Without Contrast [[id number]] Collected: 02/08/21 2145     Updated: 02/08/21 2153    Narrative:      EXAMINATION:  CT CERVICAL SPINE WO CONTRAST-  2/8/2021 9:14 PM CST     HISTORY: ams, unwitnessed fall      COMPARISON: No comparison study.     TECHNIQUE: Radiation dose equals  mGy-cm.  Automated exposure  control dose reduction technique was implemented.     Thin section axial imaging was obtained without intravenous contrast.  2-D sagittal and coronal reconstruction images were generated.     FINDINGS:      The facets are appropriately aligned.     The tibial body heights are maintained. The prevertebral soft tissues  are normal.     There is no CT evidence of acute fracture or subluxation.     There is advanced disc degeneration spondylosis C5-C6 and C6-C7 with  complete loss of disc space, 8 end plate sclerosis and anterior  posterior osteophyte formation.     There is high-grade canal stenosis associated with right paracentral  osteophyte at C5-C6 with relative canal stenosis at C6-C7 left  paracentrally due to osteophyte formation.     There is moderate bilateral foraminal stenosis at these levels  particularly at C5-C6.     There are some degenerative changes at C7-T1 with anterior osteophyte  formation.     The remaining disc spaces are maintained without significant canal  foraminal stenosis.     There is no CT evidence of posttraumatic disc herniation.     There are carotid artery calcifications.     The upper lung fields are clear.     Sphenoid sinus ethmoid sinus opacification and spurring noted.       Impression:      1. No CT evidence of acute bony injury to the cervical spine.  2. Advanced disc degeneration and spondylosis C5-C6 and to a lesser  degree C6-C7 with resulting canal and foraminal stenosis.  This report was finalized on 02/08/2021  "21:50 by Dr. Sterling Elizalde MD.    CT Head Without Contrast [[id number]] Collected: 02/08/21 2136     Updated: 02/08/21 2151    Narrative:      EXAMINATION: CT HEAD WO CONTRAST-  2/8/2021 9:36 PM CST     CT SCAN OF THE HEAD, WITHOUT CONTRAST:      HISTORY: Altered mental status, fall     COMPARISONS: 11/20/2014 head CT      TECHNIQUE:     Radiation dose equals  mGy-cm.  Automated exposure control dose  reduction technique was implemented.        CT evaluation of the head without intravenous contrast. 5 mm transaxial  images were obtained.   2-D sagittal and coronal reconstruction images  were generated.     FINDINGS:      There is no intra or extra-axial hemorrhage.     There is no mass, mass effect or midline shift.     There is no hydrocephalus     There is opacification of the sphenoid ethmoid sinuses.     There is no skull fracture or acute calvarial abnormality.             Impression:      1. No CT evidence of an acute intracranial process.  2. Extensive paranasal sinus disease.           This report was finalized on 02/08/2021 21:38 by Dr. Sterling Elizalde MD.          I have reviewed the patient's current medications.     Assessment/Plan     Active Hospital Problems    Diagnosis   • **Staphylococcus aureus bacteremia   • Right hip pain, suspected infection   • Syncope, recurrent   • Leukocytosis   • Headache   • Elevated CPK   • Seropositive rheumatoid arthritis of multiple sites (CMS/HCC)     Overview:   -myochrysine '93-'96  -methotrexate '96--->11/98;r/s  restarted 2/99--> 8/14 (anemia)  -sulfasalazine- not effective  -penicillamine 6/98-->10/98; no effect  -leflunomide 11/98-->  - Humira '13-->didn't take  - Enbrel 12/14-->3/15- no effect!      Last Assessment & Plan:   - \"aching all over\" because she had to be off her anti-rheumatic drugs for 2 weeks in preparation for her R knee surgery  - her R knee surgery got cancelled because she developed cellulitis of her L arm (with fever). She was started on " Bactrim, and then transitioned to doxycyline on Aug 1 2018 (3 week course) and her R knee surgery was postponed to 1 month from now.   - As such, she restarted her anti-rheumatics on Aug 1 2018.  - She will have to hold her anti-rheumatics again 2 weeks before her surgery  - She feels her medications are otherwise working well. No changes to be made to her current regimen, which is Humira injection q2 week, leflunomide (Arava), and salicylate     - Pt is concerned that when she transitions insurance in December of this year, she will lose her salicylate. We had a discussion and expressed that it would be best for her to stay on salicylate because we do not want the blood thinning effects of aspirin. Dr. Walden will plan to write a prior authorization for her for salicylate.     • Coronary artery disease     PCI RCA '10, NSTEMI requiring LAD PCI in '13    [facility] 9/3/19 at [facility]: no significant dz; stents patent       Plan:  1.  Patient admitted on 2/8 by Dr. Paul with syncope and collapse. Patient was found to have suspect right hip infection from recent gluteal repair.  2.  Orthopedics consult, appreciate assistance.  To OR 2/9 for I&D  3.  Blood cultures from 2/8 came back positive for staph aureus in 4/4 bottles.  Patient already on vancomycin.  Will consult infectious disease.  Repeat cultures  4.  Wound culture from 2/8 showed gram negative, will use aztreonam  5.  Continue home medications as appropriate  6.  Pacemaker interrogated, no abnormalities per nursing, awaiting full report  7.  MRI brain without contrast (2/9) shows no acute process  8.  Per family, patient has had recurrent syncope, this is not a new finding.  9.  Wound care for orthopedics   10.  PT/OT              Discharge Planning: I expect the patient to be discharged to home with home health in 2-4 days.    Electronically signed by Moises Oliveira MD, 02/09/21, 21:30 CST.

## 2021-02-11 LAB
ANION GAP SERPL CALCULATED.3IONS-SCNC: 4 MMOL/L (ref 5–15)
BACTERIA SPEC AEROBE CULT: ABNORMAL
BASOPHILS # BLD AUTO: 0.05 10*3/MM3 (ref 0–0.2)
BASOPHILS NFR BLD AUTO: 1 % (ref 0–1.5)
BUN SERPL-MCNC: 13 MG/DL (ref 8–23)
BUN/CREAT SERPL: 25 (ref 7–25)
CALCIUM SPEC-SCNC: 9 MG/DL (ref 8.6–10.5)
CHLORIDE SERPL-SCNC: 105 MMOL/L (ref 98–107)
CO2 SERPL-SCNC: 27 MMOL/L (ref 22–29)
CREAT SERPL-MCNC: 0.52 MG/DL (ref 0.57–1)
CRP SERPL-MCNC: 16.14 MG/DL (ref 0–0.5)
DEPRECATED RDW RBC AUTO: 50.8 FL (ref 37–54)
EOSINOPHIL # BLD AUTO: 0.24 10*3/MM3 (ref 0–0.4)
EOSINOPHIL NFR BLD AUTO: 4.9 % (ref 0.3–6.2)
ERYTHROCYTE [DISTWIDTH] IN BLOOD BY AUTOMATED COUNT: 15.1 % (ref 12.3–15.4)
ERYTHROCYTE [SEDIMENTATION RATE] IN BLOOD: 26 MM/HR (ref 0–20)
GFR SERPL CREATININE-BSD FRML MDRD: 118 ML/MIN/1.73
GLUCOSE SERPL-MCNC: 93 MG/DL (ref 65–99)
GRAM STN SPEC: ABNORMAL
HCT VFR BLD AUTO: 26 % (ref 34–46.6)
HGB BLD-MCNC: 8.4 G/DL (ref 12–15.9)
IMM GRANULOCYTES # BLD AUTO: 0.03 10*3/MM3 (ref 0–0.05)
IMM GRANULOCYTES NFR BLD AUTO: 0.6 % (ref 0–0.5)
ISOLATED FROM: ABNORMAL
LYMPHOCYTES # BLD AUTO: 1.48 10*3/MM3 (ref 0.7–3.1)
LYMPHOCYTES NFR BLD AUTO: 30.5 % (ref 19.6–45.3)
MCH RBC QN AUTO: 29.7 PG (ref 26.6–33)
MCHC RBC AUTO-ENTMCNC: 32.3 G/DL (ref 31.5–35.7)
MCV RBC AUTO: 91.9 FL (ref 79–97)
MONOCYTES # BLD AUTO: 0.71 10*3/MM3 (ref 0.1–0.9)
MONOCYTES NFR BLD AUTO: 14.6 % (ref 5–12)
NEUTROPHILS NFR BLD AUTO: 2.34 10*3/MM3 (ref 1.7–7)
NEUTROPHILS NFR BLD AUTO: 48.4 % (ref 42.7–76)
NRBC BLD AUTO-RTO: 0 /100 WBC (ref 0–0.2)
PLATELET # BLD AUTO: 143 10*3/MM3 (ref 140–450)
PMV BLD AUTO: 11 FL (ref 6–12)
POTASSIUM SERPL-SCNC: 4.2 MMOL/L (ref 3.5–5.2)
RBC # BLD AUTO: 2.83 10*6/MM3 (ref 3.77–5.28)
SODIUM SERPL-SCNC: 136 MMOL/L (ref 136–145)
WBC # BLD AUTO: 4.85 10*3/MM3 (ref 3.4–10.8)

## 2021-02-11 PROCEDURE — 85651 RBC SED RATE NONAUTOMATED: CPT | Performed by: INTERNAL MEDICINE

## 2021-02-11 PROCEDURE — 25010000002 VANCOMYCIN 10 G RECONSTITUTED SOLUTION: Performed by: INTERNAL MEDICINE

## 2021-02-11 PROCEDURE — 85025 COMPLETE CBC W/AUTO DIFF WBC: CPT | Performed by: INTERNAL MEDICINE

## 2021-02-11 PROCEDURE — 86140 C-REACTIVE PROTEIN: CPT | Performed by: INTERNAL MEDICINE

## 2021-02-11 PROCEDURE — 97116 GAIT TRAINING THERAPY: CPT

## 2021-02-11 PROCEDURE — 25010000002 CEFAZOLIN PER 500 MG: Performed by: INTERNAL MEDICINE

## 2021-02-11 PROCEDURE — 87040 BLOOD CULTURE FOR BACTERIA: CPT | Performed by: INTERNAL MEDICINE

## 2021-02-11 PROCEDURE — 94799 UNLISTED PULMONARY SVC/PX: CPT

## 2021-02-11 PROCEDURE — 80048 BASIC METABOLIC PNL TOTAL CA: CPT | Performed by: INTERNAL MEDICINE

## 2021-02-11 PROCEDURE — 63710000001 PREDNISONE PER 5 MG: Performed by: ORTHOPAEDIC SURGERY

## 2021-02-11 RX ORDER — CLONIDINE HYDROCHLORIDE 0.1 MG/1
0.1 TABLET ORAL NIGHTLY
COMMUNITY
End: 2021-02-12 | Stop reason: HOSPADM

## 2021-02-11 RX ORDER — SPIRONOLACTONE 50 MG/1
25 TABLET, FILM COATED ORAL DAILY
COMMUNITY
End: 2021-02-12 | Stop reason: HOSPADM

## 2021-02-11 RX ORDER — PREDNISONE 1 MG/1
2 TABLET ORAL DAILY
Status: DISCONTINUED | OUTPATIENT
Start: 2021-02-12 | End: 2021-02-12 | Stop reason: HOSPADM

## 2021-02-11 RX ORDER — FUROSEMIDE 40 MG/1
40 TABLET ORAL DAILY
Status: ON HOLD | COMMUNITY
End: 2022-04-23

## 2021-02-11 RX ORDER — TRAMADOL HYDROCHLORIDE 50 MG/1
50 TABLET ORAL EVERY 6 HOURS PRN
Start: 2021-02-11 | End: 2021-03-06 | Stop reason: HOSPADM

## 2021-02-11 RX ORDER — ONDANSETRON 4 MG/1
4 TABLET, FILM COATED ORAL EVERY 6 HOURS PRN
Start: 2021-02-11 | End: 2021-03-06 | Stop reason: HOSPADM

## 2021-02-11 RX ORDER — PREDNISONE 1 MG/1
1 TABLET ORAL DAILY
COMMUNITY
End: 2021-02-12 | Stop reason: HOSPADM

## 2021-02-11 RX ORDER — PREDNISONE 1 MG/1
5 TABLET ORAL DAILY
Start: 2021-02-12 | End: 2021-02-12 | Stop reason: HOSPADM

## 2021-02-11 RX ORDER — BUPIVACAINE HCL/0.9 % NACL/PF 0.1 %
2 PLASTIC BAG, INJECTION (ML) EPIDURAL EVERY 8 HOURS
Status: DISCONTINUED | OUTPATIENT
Start: 2021-02-11 | End: 2021-02-12 | Stop reason: HOSPADM

## 2021-02-11 RX ORDER — HYDROCODONE BITARTRATE AND ACETAMINOPHEN 7.5; 325 MG/1; MG/1
1 TABLET ORAL EVERY 4 HOURS PRN
Start: 2021-02-11 | End: 2021-03-06 | Stop reason: HOSPADM

## 2021-02-11 RX ADMIN — CARVEDILOL 12.5 MG: 6.25 TABLET, FILM COATED ORAL at 17:55

## 2021-02-11 RX ADMIN — ISOSORBIDE MONONITRATE 60 MG: 60 TABLET, EXTENDED RELEASE ORAL at 21:10

## 2021-02-11 RX ADMIN — VALACYCLOVIR 500 MG: 500 TABLET, FILM COATED ORAL at 09:06

## 2021-02-11 RX ADMIN — AZTREONAM 2 G: 2 INJECTION, POWDER, LYOPHILIZED, FOR SOLUTION INTRAMUSCULAR; INTRAVENOUS at 05:20

## 2021-02-11 RX ADMIN — ASPIRIN 81 MG: 81 TABLET, FILM COATED ORAL at 09:06

## 2021-02-11 RX ADMIN — CEFAZOLIN SODIUM 2 G: 10 INJECTION, POWDER, FOR SOLUTION INTRAVENOUS at 23:13

## 2021-02-11 RX ADMIN — CARVEDILOL 12.5 MG: 6.25 TABLET, FILM COATED ORAL at 09:06

## 2021-02-11 RX ADMIN — BUDESONIDE 0.5 MG: 0.5 INHALANT RESPIRATORY (INHALATION) at 21:01

## 2021-02-11 RX ADMIN — PREDNISONE 5 MG: 5 TABLET ORAL at 09:06

## 2021-02-11 RX ADMIN — LEVOTHYROXINE SODIUM 75 MCG: 75 TABLET ORAL at 05:20

## 2021-02-11 RX ADMIN — TRAMADOL HYDROCHLORIDE 50 MG: 50 TABLET, FILM COATED ORAL at 21:11

## 2021-02-11 RX ADMIN — AZTREONAM 2 G: 2 INJECTION, POWDER, LYOPHILIZED, FOR SOLUTION INTRAMUSCULAR; INTRAVENOUS at 12:48

## 2021-02-11 RX ADMIN — ISOSORBIDE MONONITRATE 60 MG: 60 TABLET, EXTENDED RELEASE ORAL at 09:06

## 2021-02-11 RX ADMIN — VANCOMYCIN HYDROCHLORIDE 1750 MG: 10 INJECTION, POWDER, LYOPHILIZED, FOR SOLUTION INTRAVENOUS at 09:06

## 2021-02-11 RX ADMIN — SODIUM CHLORIDE, PRESERVATIVE FREE 10 ML: 5 INJECTION INTRAVENOUS at 09:07

## 2021-02-11 RX ADMIN — FOLIC ACID 1000 MCG: 1 TABLET ORAL at 09:06

## 2021-02-11 RX ADMIN — GUAIFENESIN 600 MG: 600 TABLET, EXTENDED RELEASE ORAL at 09:06

## 2021-02-11 RX ADMIN — BUDESONIDE 0.5 MG: 0.5 INHALANT RESPIRATORY (INHALATION) at 06:22

## 2021-02-11 RX ADMIN — PANTOPRAZOLE SODIUM 40 MG: 40 TABLET, DELAYED RELEASE ORAL at 09:06

## 2021-02-11 RX ADMIN — GUAIFENESIN 600 MG: 600 TABLET, EXTENDED RELEASE ORAL at 21:10

## 2021-02-11 RX ADMIN — HYDROCODONE BITARTRATE AND ACETAMINOPHEN 1 TABLET: 7.5; 325 TABLET ORAL at 07:57

## 2021-02-11 RX ADMIN — CLOPIDOGREL 75 MG: 75 TABLET, FILM COATED ORAL at 09:06

## 2021-02-11 RX ADMIN — CEFAZOLIN SODIUM 2 G: 10 INJECTION, POWDER, FOR SOLUTION INTRAVENOUS at 16:50

## 2021-02-11 NOTE — PROGRESS NOTES
Infectious Diseases Progress Note    Patient:  Tawny Shin  YOB: 1955  MRN: [id number]   Admit date: 2/8/2021   Admitting Physician: Moises Oliveira MD  Primary Care Physician: Davon Urrutia MD    Chief Complaint/Interval History: She has had additional blood cultures turn positive.  She overall feels better.  Spoke with pharmacy she received 3 doses of cefazolin in January preoperatively without difficulty.  She has no new localizing symptoms.  Discussed with Dr. Oliveira.    Intake/Output Summary (Last 24 hours) at 2/11/2021 1344  Last data filed at 2/11/2021 0907  Gross per 24 hour   Intake 471.25 ml   Output 1250 ml   Net -778.75 ml     Allergies:   Allergies   Allergen Reactions   • Atorvastatin Other (See Comments)     LEG CRAMPS     • Amoxicillin Rash   • Escitalopram Unknown - Low Severity   • Nabumetone Rash   • Niacin Er Rash   • Penicillins Rash   • Simvastatin Rash     Current Scheduled Medications:   aspirin, 81 mg, Oral, Daily  aztreonam, 2 g, Intravenous, Q8H  budesonide, 0.5 mg, Nebulization, BID - RT  carvedilol, 12.5 mg, Oral, BID With Meals  clopidogrel, 75 mg, Oral, Daily  folic acid, 1,000 mcg, Oral, Daily  guaiFENesin, 600 mg, Oral, Q12H  isosorbide mononitrate, 60 mg, Oral, BID  levothyroxine, 75 mcg, Oral, Q AM  pantoprazole, 40 mg, Oral, Daily  predniSONE, 5 mg, Oral, Daily  sodium chloride, 10 mL, Intravenous, Q12H  valACYclovir, 500 mg, Oral, Daily  vancomycin, 1,750 mg, Intravenous, Q24H      Current PRN Medications:  •  acetaminophen **OR** acetaminophen **OR** acetaminophen  •  HYDROcodone-acetaminophen  •  HYDROmorphone  •  Morphine  •  nitroglycerin  •  ondansetron **OR** ondansetron  •  [COMPLETED] Insert peripheral IV **AND** sodium chloride  •  sodium chloride  •  traMADol    Review of Systems No cough, shortness of breath.  No symptoms to suggest TIA.  No rash.  No diarrhea.    Vital Signs:  /52 (BP Location: Left arm, Patient Position: Sitting)    "Pulse 67   Temp 98.2 °F (36.8 °C) (Oral)   Resp 18   Ht 170.2 cm (67.01\")   Wt 94.8 kg (209 lb)   SpO2 95%   BMI 32.73 kg/m²     Physical Exam  Vital signs - reviewed.  Line/IV (right hand peripheral) site - No erythema, warmth, induration, or tenderness.  Sclera nonicteric.  No conjunctival hemorrhages.  Lungs clear without crackles  Heart distant heart sounds.  No systolic or diastolic murmur.  Extremities no splinter hemorrhages or Janeway lesions  Right hip with wound VAC in place.  No surrounding erythema.    Lab Results:  CBC:   Results from last 7 days   Lab Units 02/11/21  0613 02/10/21  0445 02/09/21  0426 02/08/21  1936   WBC 10*3/mm3 4.85 7.46 10.50 11.33*   HEMOGLOBIN g/dL 8.4* 9.1* 10.6* 12.4   HEMATOCRIT % 26.0* 29.0* 33.5* 37.1   PLATELETS 10*3/mm3 143 136* 166 191     BMP:  Results from last 7 days   Lab Units 02/11/21  0613 02/10/21  0445 02/09/21  0426 02/08/21  1936   SODIUM mmol/L 136 135* 137 136   POTASSIUM mmol/L 4.2 3.8 3.6 4.0   CHLORIDE mmol/L 105 103 105 98   CO2 mmol/L 27.0 24.0 24.0 28.0   BUN mg/dL 13 17 16 17   CREATININE mg/dL 0.52* 0.59 0.66 0.81   GLUCOSE mg/dL 93 83 109* 114*   CALCIUM mg/dL 9.0 8.7 8.7 9.3   ALT (SGPT) U/L  --  17  --  13     Culture Results:   Blood Culture   Date Value Ref Range Status   02/10/2021 Abnormal Stain (C)  Preliminary   02/10/2021 Abnormal Stain (C)  Preliminary   02/09/2021 Staphylococcus aureus (C)  Final     Comment:     Infectious disease consultation is highly recommended to rule out distant foci of infection.   02/08/2021 Staphylococcus aureus (C)  Final     Comment:     Infectious disease consultation is highly recommended to rule out distant foci of infection.   02/08/2021 Staphylococcus aureus (C)  Final     Comment:     Infectious disease consultation is highly recommended to rule out distant foci of infection.     Wound Culture   Date Value Ref Range Status   02/08/2021 Moderate growth (3+) Escherichia coli (A)  Final   02/08/2021 " Light growth (2+) Staphylococcus aureus (A)  Final   Susceptibility     Staphylococcus aureus     LICHA     Gentamicin <=0.5 ug/ml Susceptible     Oxacillin 0.5 ug/ml Susceptible     Rifampin <=0.5 ug/ml Susceptible     Vancomycin 1 ug/ml Susceptible        Radiology: None  Additional Studies Reviewed: None    Impression:   1.  Postoperative right hip infection.  Staff or is the primary pathogen E. Coli also recovered from culture.  2.  Bloodstream infection with methicillin susceptible Staphylococcus aureus  3.  Rheumatoid arthritis-on immunosuppression with Cimzia  4.  History of pacemaker insertion  5.  History penicillin allergy-has received cefazolin without reaction    Recommendations:   Discontinue vancomycin and aztreonam  Begin cefazolin 2 g IV every 8 hours  Repeat blood cultures to make sure bacteremia clearing  Further evaluation for endovascular focus of infection (pacer lead or heart valves) given positive blood cultures on February 8, February 9, and February 10  Continue to monitor for metastatic focus of staph infection  ESTEFANI  Cardiology evaluation  Continue to follow    Elie Ohara MD

## 2021-02-11 NOTE — PROGRESS NOTES
Orthopaedic Surgery Progress Note      2/11/2021   10:41 CST    Name:  Tawny Shin  MRN:    [id number]     Acct:     [id number]   Room:  [facility] Day: 2     Admit Date: 2/8/2021  PCP: Davon Urrutia MD        Subjective:     C/C: POD 2 Days Post-Op HIP INCISION AND DRAINAGE (Right).  Overall doing well today.  Sitting up in chair with daughter present in room.  Patient had quite a bit of pain with changing wound vac, plan to pre-medicate with pain medication beforehand next time.  Planning to go to [facility] today.  Denies any new complaints this AM.  Cultures showing staph aureus and E. Coli.  CRP 16.14, ESR 21.     Interval History: Status: remained stable     Pain: controlled currently      Medications:     Allergies:   Allergies   Allergen Reactions   • Atorvastatin Other (See Comments)     LEG CRAMPS     • Amoxicillin Rash   • Escitalopram Unknown - Low Severity   • Nabumetone Rash   • Niacin Er Rash   • Penicillins Rash   • Simvastatin Rash       Current Meds:   Current Facility-Administered Medications   Medication Dose Route Frequency Provider Last Rate Last Admin   • acetaminophen (TYLENOL) tablet 650 mg  650 mg Oral Q4H PRN Nino Carlson MD        Or   • acetaminophen (TYLENOL) 160 MG/5ML solution 650 mg  650 mg Oral Q4H PRN Nino Carlson MD        Or   • acetaminophen (TYLENOL) suppository 650 mg  650 mg Rectal Q4H PRN Nino Carlson MD       • aspirin EC tablet 81 mg  81 mg Oral Daily Nino Carlson MD   81 mg at 02/11/21 0906   • aztreonam (AZACTAM) 2 g/100 mL 0.9% NS (mbp)  2 g Intravenous Q8H Moises Oliveira MD 0 mL/hr at 02/09/21 1124 2 g at 02/11/21 0520   • budesonide (PULMICORT) nebulizer solution 0.5 mg  0.5 mg Nebulization BID - RT Nino Carlson MD   0.5 mg at 02/11/21 0622   • carvedilol (COREG) tablet 12.5 mg  12.5 mg Oral BID With Meals Nino Carlson MD   12.5 mg at 02/11/21 0906   • clopidogrel (PLAVIX) tablet 75 mg  75 mg Oral Daily Nino Carlson MD   75 mg at 02/11/21 0906   • folic acid  (FOLVITE) tablet 1,000 mcg  1,000 mcg Oral Daily Nino Carslon MD   1,000 mcg at 02/11/21 0906   • guaiFENesin (MUCINEX) 12 hr tablet 600 mg  600 mg Oral Q12H Nino Carlson MD   600 mg at 02/11/21 0906   • HYDROcodone-acetaminophen (NORCO) 7.5-325 MG per tablet 1 tablet  1 tablet Oral Q4H PRN Moises Oliveira MD   1 tablet at 02/11/21 0757   • HYDROmorphone (DILAUDID) injection 1 mg  1 mg Intravenous Daily PRN Moises Oliveira MD       • isosorbide mononitrate (IMDUR) 24 hr tablet 60 mg  60 mg Oral BID Nino Carlson MD   60 mg at 02/11/21 0906   • levothyroxine (SYNTHROID, LEVOTHROID) tablet 75 mcg  75 mcg Oral Q AM Nino Carlson MD   75 mcg at 02/11/21 0520   • morphine injection 4 mg  4 mg Intravenous Q4H PRN Moises Oliveira MD   4 mg at 02/10/21 1632   • nitroglycerin (NITROSTAT) SL tablet 0.4 mg  0.4 mg Sublingual Q5 Min PRN Nino Carlson MD       • ondansetron (ZOFRAN) tablet 4 mg  4 mg Oral Q6H PRN Nino Carlson MD        Or   • ondansetron (ZOFRAN) injection 4 mg  4 mg Intravenous Q6H PRN Nino Carlson MD       • pantoprazole (PROTONIX) EC tablet 40 mg  40 mg Oral Daily Nino Carlson MD   40 mg at 02/11/21 0906   • predniSONE (DELTASONE) tablet 5 mg  5 mg Oral Daily Nino Carlson MD   5 mg at 02/11/21 0906   • sodium chloride 0.9 % flush 10 mL  10 mL Intravenous PRN Nino Carlson MD       • sodium chloride 0.9 % flush 10 mL  10 mL Intravenous Q12H Nino Carlson MD   10 mL at 02/11/21 0907   • sodium chloride 0.9 % flush 10 mL  10 mL Intravenous PRN Nino Carlson MD       • traMADol (ULTRAM) tablet 50 mg  50 mg Oral Q6H PRN Nino Carlson MD   50 mg at 02/10/21 2149   • valACYclovir (VALTREX) tablet 500 mg  500 mg Oral Daily Nino Carlson MD   500 mg at 02/11/21 0906   • vancomycin 1750 mg/500 mL 0.9% NS IVPB ([facility])  1,750 mg Intravenous Q24H Elie Copeland MD   1,750 mg at 02/11/21 0906           Data:     Code Status:    Code Status and Medical Interventions:   Ordered at: 02/08/21 2360     Level Of Support  "Discussed With:    Patient     Code Status:    CPR     Medical Interventions (Level of Support Prior to Arrest):    Full       Family History   Problem Relation Age of Onset   • Cancer Mother    • Heart disease Father        Social History     Socioeconomic History   • Marital status:      Spouse name: Not on file   • Number of children: Not on file   • Years of education: Not on file   • Highest education level: Not on file   Tobacco Use   • Smoking status: Never Smoker   • Smokeless tobacco: Never Used   Substance and Sexual Activity   • Alcohol use: No   • Drug use: Never   • Sexual activity: Defer       Vitals:  /54 (BP Location: Left arm, Patient Position: Lying)   Pulse 71   Temp 98.1 °F (36.7 °C) (Oral)   Resp 18   Ht 170.2 cm (67.01\")   Wt 94.8 kg (209 lb)   SpO2 94%   BMI 32.73 kg/m²   Temp (24hrs), Av.3 °F (36.8 °C), Min:97.9 °F (36.6 °C), Max:98.9 °F (37.2 °C)      I/O (24Hr):    Intake/Output Summary (Last 24 hours) at 2021 1041  Last data filed at 2021 0907  Gross per 24 hour   Intake 2196.25 ml   Output 1800 ml   Net 396.25 ml       Labs:  Lab Results (last 72 hours)     Procedure Component Value Units Date/Time    Body Fluid Culture - Synovial Fluid, Hip, Right [[id number]]  (Abnormal) Collected: 21 0740    Specimen: Synovial Fluid from Hip, Right Updated: 21 0845     Body Fluid Culture Moderate growth (3+) Staphylococcus aureus     Gram Stain Many (4+) WBCs seen      Rare (1+) Gram positive cocci    Narrative:      4    Wound Culture - Swab, Hip, Right [[id number]]  (Abnormal)  (Susceptibility) Collected: 21    Specimen: Swab from Hip, Right Updated: 21 0734     Wound Culture Moderate growth (3+) Escherichia coli      Light growth (2+) Staphylococcus aureus     Gram Stain Rare (1+) WBCs seen      Rare (1+) Gram negative bacilli    Susceptibility      Escherichia coli     LICHA     Ampicillin Resistant     Ampicillin + Sulbactam Resistant     " Cefepime Susceptible     Ceftazidime Susceptible     Ceftriaxone Susceptible     Gentamicin Susceptible     Levofloxacin Susceptible     Piperacillin + Tazobactam Susceptible     Tetracycline Susceptible     Trimethoprim + Sulfamethoxazole Susceptible                Susceptibility      Staphylococcus aureus     LICHA     Clindamycin Resistant     Erythromycin Resistant     Inducible Clindamycin Resistance Negative     Oxacillin Susceptible     Penicillin G Resistant     Rifampin Susceptible     Tetracycline Resistant     Trimethoprim + Sulfamethoxazole Susceptible     Vancomycin Susceptible                Susceptibility Comments     Escherichia coli    Cefazolin sensitivity will not be reported for Enterobacteriaceae in non-urine isolates. If cefazolin is preferred, please call the microbiology lab to request an E-test.  With the exception of urinary-sourced infections, aminoglycosides should not be used as monotherapy.             Sedimentation Rate [[id number]]  (Abnormal) Collected: 02/11/21 0613    Specimen: Blood Updated: 02/11/21 0707     Sed Rate 26 mm/hr     Basic Metabolic Panel [[id number]]  (Abnormal) Collected: 02/11/21 0613    Specimen: Blood Updated: 02/11/21 0705     Glucose 93 mg/dL      BUN 13 mg/dL      Creatinine 0.52 mg/dL      Sodium 136 mmol/L      Potassium 4.2 mmol/L      Chloride 105 mmol/L      CO2 27.0 mmol/L      Calcium 9.0 mg/dL      eGFR Non African Amer 118 mL/min/1.73      BUN/Creatinine Ratio 25.0     Anion Gap 4.0 mmol/L     Narrative:      GFR Normal >60  Chronic Kidney Disease <60  Kidney Failure <15      C-reactive Protein [[id number]]  (Abnormal) Collected: 02/11/21 0613    Specimen: Blood Updated: 02/11/21 0705     C-Reactive Protein 16.14 mg/dL     Blood Culture - Blood, Blood, Venous Line [[id number]]  (Abnormal) Collected: 02/09/21 1736    Specimen: Blood, Venous Line Updated: 02/11/21 0702     Blood Culture Staphylococcus aureus     Comment: Infectious disease consultation is  highly recommended to rule out distant foci of infection.        Isolated from Aerobic Bottle     Gram Stain Aerobic Bottle Gram positive cocci in clusters    Narrative:      Refer to previous blood culture collected on 2/8/2021 2036 for MICs.      CBC & Differential [[id number]]  (Abnormal) Collected: 02/11/21 0613    Specimen: Blood Updated: 02/11/21 0647    Narrative:      The following orders were created for panel order CBC & Differential.  Procedure                               Abnormality         Status                     ---------                               -----------         ------                     CBC Auto Differential[[id number]]        Abnormal            Final result                 Please view results for these tests on the individual orders.    CBC Auto Differential [[id number]]  (Abnormal) Collected: 02/11/21 0613    Specimen: Blood Updated: 02/11/21 0647     WBC 4.85 10*3/mm3      RBC 2.83 10*6/mm3      Hemoglobin 8.4 g/dL      Hematocrit 26.0 %      MCV 91.9 fL      MCH 29.7 pg      MCHC 32.3 g/dL      RDW 15.1 %      RDW-SD 50.8 fl      MPV 11.0 fL      Platelets 143 10*3/mm3      Neutrophil % 48.4 %      Lymphocyte % 30.5 %      Monocyte % 14.6 %      Eosinophil % 4.9 %      Basophil % 1.0 %      Immature Grans % 0.6 %      Neutrophils, Absolute 2.34 10*3/mm3      Lymphocytes, Absolute 1.48 10*3/mm3      Monocytes, Absolute 0.71 10*3/mm3      Eosinophils, Absolute 0.24 10*3/mm3      Basophils, Absolute 0.05 10*3/mm3      Immature Grans, Absolute 0.03 10*3/mm3      nRBC 0.0 /100 WBC     Blood Culture - Blood, Wrist, Left [[id number]]  (Abnormal) Collected: 02/08/21 1946    Specimen: Blood from Wrist, Left Updated: 02/11/21 0626     Blood Culture Staphylococcus aureus     Comment: Infectious disease consultation is highly recommended to rule out distant foci of infection.        Isolated from Aerobic and Anaerobic Bottles     Gram Stain Aerobic Bottle Gram positive cocci      Anaerobic Bottle  Gram positive cocci    Narrative:      Refer to previous blood culture collected on  2/8/2021 at 2036 for MICS.     Blood Culture - Blood, Arm, Right [[id number]]  (Abnormal)  (Susceptibility) Collected: 02/08/21 1936    Specimen: Blood from Arm, Right Updated: 02/11/21 0626     Blood Culture Staphylococcus aureus     Comment: Infectious disease consultation is highly recommended to rule out distant foci of infection.        Isolated from Aerobic and Anaerobic Bottles     Gram Stain Aerobic Bottle Gram positive cocci in clusters      Anaerobic Bottle Gram positive cocci in clusters    Susceptibility      Staphylococcus aureus     LICHA     Gentamicin Susceptible     Oxacillin Susceptible     Rifampin Susceptible     Vancomycin Susceptible                    Blood Culture With BREONNA - Blood, Arm, Left [[id number]]  (Abnormal) Collected: 02/10/21 0445    Specimen: Blood from Arm, Left Updated: 02/11/21 0511     Blood Culture Abnormal Stain     Gram Stain Aerobic Bottle Gram positive cocci    Blood Culture With BREONNA - Blood, Arm, Right [[id number]]  (Abnormal) Collected: 02/10/21 0445    Specimen: Blood from Arm, Right Updated: 02/11/21 0511     Blood Culture Abnormal Stain     Gram Stain Aerobic Bottle Gram positive cocci    Vancomycin, Trough [[id number]]  (Normal) Collected: 02/10/21 1903    Specimen: Blood Updated: 02/10/21 1929     Vancomycin Trough 11.60 mcg/mL     Tissue / Bone Culture - Tissue, Hip, Right [[id number]] Collected: 02/09/21 1745    Specimen: Tissue from Hip, Right Updated: 02/10/21 1308     Tissue Culture Growth present, too young to evaluate     Gram Stain Rare (1+) WBCs seen      Rare (1+) Gram positive cocci    Comprehensive Metabolic Panel [[id number]]  (Abnormal) Collected: 02/10/21 0445    Specimen: Blood Updated: 02/10/21 0608     Glucose 83 mg/dL      BUN 17 mg/dL      Creatinine 0.59 mg/dL      Sodium 135 mmol/L      Potassium 3.8 mmol/L      Chloride 103 mmol/L      CO2 24.0 mmol/L      Calcium 8.7  mg/dL      Total Protein 5.7 g/dL      Albumin 2.50 g/dL      ALT (SGPT) 17 U/L      AST (SGOT) 77 U/L      Alkaline Phosphatase 69 U/L      Total Bilirubin 0.7 mg/dL      eGFR Non African Amer 102 mL/min/1.73      Globulin 3.2 gm/dL      A/G Ratio 0.8 g/dL      BUN/Creatinine Ratio 28.8     Anion Gap 8.0 mmol/L     Narrative:      GFR Normal >60  Chronic Kidney Disease <60  Kidney Failure <15      CBC (No Diff) [[id number]]  (Abnormal) Collected: 02/10/21 0445    Specimen: Blood Updated: 02/10/21 0541     WBC 7.46 10*3/mm3      RBC 3.15 10*6/mm3      Hemoglobin 9.1 g/dL      Hematocrit 29.0 %      MCV 92.1 fL      MCH 28.9 pg      MCHC 31.4 g/dL      RDW 15.0 %      RDW-SD 51.0 fl      MPV 10.8 fL      Platelets 136 10*3/mm3     Blood Culture ID, PCR - Blood, Arm, Right [[id number]]  (Abnormal) Collected: 02/08/21 1936    Specimen: Blood from Arm, Right Updated: 02/09/21 1643     BCID, PCR Staphylococcus aureus. Identification by BCID PCR.    Lipid Panel [[id number]]  (Abnormal) Collected: 02/09/21 0426    Specimen: Blood Updated: 02/09/21 0556     Total Cholesterol 129 mg/dL      Triglycerides 108 mg/dL      HDL Cholesterol 36 mg/dL      LDL Cholesterol  73 mg/dL      VLDL Cholesterol 20 mg/dL      LDL/HDL Ratio 1.98    Narrative:      Cholesterol Reference Ranges  (U.S. Department of Health and Human Services ATP III Classifications)    Desirable          <200 mg/dL  Borderline High    200-239 mg/dL  High Risk          >240 mg/dL      Triglyceride Reference Ranges  (U.S. Department of Health and Human Services ATP III Classifications)    Normal           <150 mg/dL  Borderline High  150-199 mg/dL  High             200-499 mg/dL  Very High        >500 mg/dL    HDL Reference Ranges  (U.S. Department of Health and Human Services ATP III Classifcations)    Low     <40 mg/dl (major risk factor for CHD)  High    >60 mg/dl ('negative' risk factor for CHD)        LDL Reference Ranges  (U.S. Department of Health and Human  Services ATP III Classifcations)    Optimal          <100 mg/dL  Near Optimal     100-129 mg/dL  Borderline High  130-159 mg/dL  High             160-189 mg/dL  Very High        >189 mg/dL    Hemoglobin A1c [[id number]]  (Normal) Collected: 02/09/21 0426    Specimen: Blood Updated: 02/09/21 0535     Hemoglobin A1C 5.50 %     Narrative:      Hemoglobin A1C Ranges:    Increased Risk for Diabetes  5.7% to 6.4%  Diabetes                     >= 6.5%  Diabetic Goal                < 7.0%    Basic Metabolic Panel [[id number]]  (Abnormal) Collected: 02/09/21 0426    Specimen: Blood Updated: 02/09/21 0535     Glucose 109 mg/dL      BUN 16 mg/dL      Creatinine 0.66 mg/dL      Sodium 137 mmol/L      Potassium 3.6 mmol/L      Chloride 105 mmol/L      CO2 24.0 mmol/L      Calcium 8.7 mg/dL      eGFR Non African Amer 89 mL/min/1.73      BUN/Creatinine Ratio 24.2     Anion Gap 8.0 mmol/L     Narrative:      GFR Normal >60  Chronic Kidney Disease <60  Kidney Failure <15      C-reactive Protein [[id number]]  (Abnormal) Collected: 02/09/21 0426    Specimen: Blood Updated: 02/09/21 0535     C-Reactive Protein 15.58 mg/dL     Sedimentation Rate [[id number]]  (Abnormal) Collected: 02/09/21 0426    Specimen: Blood Updated: 02/09/21 0518     Sed Rate 21 mm/hr     CBC (No Diff) [[id number]]  (Abnormal) Collected: 02/09/21 0426    Specimen: Blood Updated: 02/09/21 0507     WBC 10.50 10*3/mm3      RBC 3.65 10*6/mm3      Hemoglobin 10.6 g/dL      Hematocrit 33.5 %      MCV 91.8 fL      MCH 29.0 pg      MCHC 31.6 g/dL      RDW 15.3 %      RDW-SD 51.0 fl      MPV 11.3 fL      Platelets 166 10*3/mm3     Urinalysis With Culture If Indicated - Urine, Catheter [[id number]]  (Abnormal) Collected: 02/08/21 2058    Specimen: Urine, Catheter Updated: 02/08/21 2125     Color, UA Yellow     Appearance, UA Clear     pH, UA 5.5     Specific Gravity, UA 1.020     Glucose, UA Negative     Ketones, UA 15 mg/dL (1+)     Bilirubin, UA Negative     Blood, UA Negative      Protein, UA Trace     Leuk Esterase, UA Negative     Nitrite, UA Negative     Urobilinogen, UA 0.2 E.U./dL    Narrative:      Urine microscopic not indicated.    [place] Draw [[id number]] Collected: 02/08/21 1936    Specimen: Blood Updated: 02/08/21 2101    Narrative:      The following orders were created for panel order [place] Draw.  Procedure                               Abnormality         Status                     ---------                               -----------         ------                     Light Blue Top[[id number]]                                   Final result               Green Top (Gel)[[id number]]                                  Final result               Lavender Top[[id number]]                                     Final result               Red Top[[id number]]                                          Final result               [place] Blood Culture Jacob...[[id number]]                      Final result               Gray Top - Ice[[id number]]                                   Final result                 Please view results for these tests on the individual orders.    [place] Blood Culture Bottle Set [[id number]] Collected: 02/08/21 1946    Specimen: Blood from Wrist, Left Updated: 02/08/21 2101     Extra Tube Hold for add-ons.     Comment: Auto resulted.       Light Blue Top [[id number]] Collected: 02/08/21 1936    Specimen: Blood from Arm, Right Updated: 02/08/21 2046     Extra Tube hold for add-on     Comment: Auto resulted       Green Top (Gel) [[id number]] Collected: 02/08/21 1936    Specimen: Blood from Arm, Right Updated: 02/08/21 2046     Extra Tube Hold for add-ons.     Comment: Auto resulted.       Lavender Top [[id number]] Collected: 02/08/21 1936    Specimen: Blood from Arm, Right Updated: 02/08/21 2046     Extra Tube hold for add-on     Comment: Auto resulted       Red Top [[id number]] Collected: 02/08/21 1936    Specimen: Blood from Arm, Right Updated: 02/08/21 2046     Extra Tube Hold  for add-ons.     Comment: Auto resulted.       Gray Top - Ice [[id number]] Collected: 02/08/21 1936    Specimen: Blood from Arm, Right Updated: 02/08/21 2046     Extra Tube Hold for add-ons.     Comment: Auto resulted.       COVID-19, [facility] IN-HOUSE,NASAL Swab (NO TRANSPORT MEDIA) 2 HR TAT - Swab, Nasopharynx [[id number]]  (Normal) Collected: 02/08/21 1945    Specimen: Swab from Nasopharynx Updated: 02/08/21 2038     COVID19 Presumptive Negative    Narrative:      Fact sheet for providers: https://www.fda.gov/media/990631/download     Fact sheet for patients: https://www.fda.gov/media/041053/download    Test performed by PCR.  If inconsistent with clinical signs and symptoms patient should be tested with different authorized molecular test.    D-dimer, Quantitative [[id number]]  (Abnormal) Collected: 02/08/21 1936    Specimen: Blood from Arm, Right Updated: 02/08/21 2033     D-Dimer, Quantitative 13.53 mg/L (FEU)     Narrative:      Reference Range is 0-0.50 mg/L FEU. However, results <0.50 mg/L FEU tends to rule out DVT or PE. Results >0.50 mg/L FEU are not useful in predicting absence or presence of DVT or PE.      CK [[id number]]  (Abnormal) Collected: 02/08/21 1936    Specimen: Blood from Arm, Right Updated: 02/08/21 2025     Creatine Kinase 324 U/L     Procalcitonin [[id number]]  (Abnormal) Collected: 02/08/21 1936    Specimen: Blood from Arm, Right Updated: 02/08/21 2012     Procalcitonin 0.86 ng/mL     Narrative:      As a Marker for Sepsis (Non-Neonates):   1. <0.5 ng/mL represents a low risk of severe sepsis and/or septic shock.  1. >2 ng/mL represents a high risk of severe sepsis and/or septic shock.    As a Marker for Lower Respiratory Tract Infections that require antibiotic therapy:  PCT on Admission     Antibiotic Therapy             6-12 Hrs later  > 0.5                Strongly Recommended            >0.25 - <0.5         Recommended  0.1 - 0.25           Discouraged                   Remeasure/reassess  "PCT  <0.1                 Strongly Discouraged          Remeasure/reassess PCT      As 28 day mortality risk marker: \"Change in Procalcitonin Result\" (> 80 % or <=80 %) if Day 0 (or Day 1) and Day 4 values are available. Refer to http://www.[facility]-pct-calculator.com/   Change in PCT <=80 %   A decrease of PCT levels below or equal to 80 % defines a positive change in PCT test result representing a higher risk for 28-day all-cause mortality of patients diagnosed with severe sepsis or septic shock.  Change in PCT > 80 %   A decrease of PCT levels of more than 80 % defines a negative change in PCT result representing a lower risk for 28-day all-cause mortality of patients diagnosed with severe sepsis or septic shock.                Results may be falsely decreased if patient taking Biotin.     Comprehensive Metabolic Panel [[id number]]  (Abnormal) Collected: 02/08/21 1936    Specimen: Blood from Arm, Right Updated: 02/08/21 2006     Glucose 114 mg/dL      BUN 17 mg/dL      Creatinine 0.81 mg/dL      Sodium 136 mmol/L      Potassium 4.0 mmol/L      Chloride 98 mmol/L      CO2 28.0 mmol/L      Calcium 9.3 mg/dL      Total Protein 6.7 g/dL      Albumin 3.60 g/dL      ALT (SGPT) 13 U/L      AST (SGOT) 25 U/L      Alkaline Phosphatase 91 U/L      Total Bilirubin 0.7 mg/dL      eGFR Non African Amer 71 mL/min/1.73      Globulin 3.1 gm/dL      A/G Ratio 1.2 g/dL      BUN/Creatinine Ratio 21.0     Anion Gap 10.0 mmol/L     Narrative:      GFR Normal >60  Chronic Kidney Disease <60  Kidney Failure <15      Lactic Acid, Plasma [[id number]]  (Normal) Collected: 02/08/21 1936    Specimen: Blood from Arm, Right Updated: 02/08/21 2000     Lactate 1.2 mmol/L     CBC & Differential [[id number]]  (Abnormal) Collected: 02/08/21 1936    Specimen: Blood from Arm, Right Updated: 02/08/21 1947    Narrative:      The following orders were created for panel order CBC & Differential.  Procedure                               Abnormality         " Status                     ---------                               -----------         ------                     CBC Auto Differential[[id number]]        Abnormal            Final result                 Please view results for these tests on the individual orders.    CBC Auto Differential [[id number]]  (Abnormal) Collected: 02/08/21 1936    Specimen: Blood from Arm, Right Updated: 02/08/21 1947     WBC 11.33 10*3/mm3      RBC 4.16 10*6/mm3      Hemoglobin 12.4 g/dL      Hematocrit 37.1 %      MCV 89.2 fL      MCH 29.8 pg      MCHC 33.4 g/dL      RDW 15.5 %      RDW-SD 50.1 fl      MPV 10.6 fL      Platelets 191 10*3/mm3      Neutrophil % 75.8 %      Lymphocyte % 12.6 %      Monocyte % 10.4 %      Eosinophil % 0.3 %      Basophil % 0.4 %      Immature Grans % 0.5 %      Neutrophils, Absolute 8.58 10*3/mm3      Lymphocytes, Absolute 1.43 10*3/mm3      Monocytes, Absolute 1.18 10*3/mm3      Eosinophils, Absolute 0.03 10*3/mm3      Basophils, Absolute 0.05 10*3/mm3      Immature Grans, Absolute 0.06 10*3/mm3      nRBC 0.0 /100 WBC               Physical Exam:   General: NAD, AAOx3. Cooperative with exam.   Right Hip: Incision is clean, dry, intact. Good seal of wound vac.  No skin streaking, overlying erythema or obvious drainage. Dressing is Clean, Dry, Intact. Neurovascular intact distally. Moderate tenderness to palpation, soft throughout. No signs or symptoms of DVT or PE.      Assessment:     Primary Problem  Staphylococcus aureus bacteremia  POD 2 Days Post-Op HIP INCISION AND DRAINAGE (Right)      Plan:     1. Continue PT OT - PWB <50%  2. PT managing wound vac - MWF schedule; discussed with PT who will change out dressing today - 125 mm of continuous suction.  The incision was approximately 20 cm in length and 6 cm in depth down to the level of the greater trochanter.  3. ID consulted, managing IV abx type, duration, and dose  4. OK for dc to LTAC from ortho standpoint when medically appropriate - f/u outpatient  in 3-4 wees      Electronically signed by Silverio Parekh PA-C on 2/11/2021 at 10:41 CST

## 2021-02-11 NOTE — PROGRESS NOTES
Continued Stay Note   [place]     Patient Name: Tawny Shin  MRN: [id number]  Today's Date: 2/11/2021    Admit Date: 2/8/2021    Discharge Plan     Row Name 02/11/21 0956       Plan    Plan Comments  Order for LTAC received. Chelsea with [facility] aware of new referral and states pt is appropriate for LTAC. Due to staffing issues they cannot accept pt today. Chelsea states she will update on bed status tomorrow.        Discharge Codes    No documentation.             OSIEL Pizano

## 2021-02-11 NOTE — PLAN OF CARE
Goal Outcome Evaluation:  Plan of Care Reviewed With: patient  Progress: improving  Outcome Summary: Pt alert and oriented x4. C/o mild R hip pain. Prn ultram given as ordered with good results. Ambulating well upx1 with walker. IV abx continue as ordered. Wound vac in place, draining well. VSS. Safety maintained. Will continue to monitor.

## 2021-02-11 NOTE — THERAPY TREATMENT NOTE
Acute Care - Physical Therapy Treatment Note  [facility]     Patient Name: Tawny Shin  : 1955  MRN: [id number]  Today's Date: 2021   Onset of Illness/Injury or Date of Surgery: 21       PT Assessment (last 12 hours)      PT Evaluation and Treatment     [facility] Name 21          Physical Therapy Time and Intention    Subjective Information  complains of;pain;weakness  -     Document Type  therapy note (daily note)  -     Mode of Treatment  physical therapy  -[facility] Name 21 09          General Information    Existing Precautions/Restrictions  fall;right;hip;weight bearing RLE TTWB  -[facility] Name 21          Pain Scale: Numbers Pre/Post-Treatment    Pretreatment Pain Rating  /10  -     Pain Location - Side  Right  -     Pain Location - Orientation  incisional  -     Pain Location  hip  -[facility] Name 21          Pain Scale: Word Pre/Post-Treatment    Pain Intervention(s)  Medication (See MAR);Repositioned;Ambulation/increased activity  -[facility] Name 21          Bed Mobility    Supine-Sit [place] (Bed Mobility)  contact guard;minimum assist (75% patient effort);verbal cues  -     Sit-Supine [place] (Bed Mobility)  -- CHAIR  -[facility] Name 21          Transfers    Sit-Stand [place] (Transfers)  contact guard;verbal cues  -     Stand-Sit [place] (Transfers)  contact guard;verbal cues  -     [place] Level (Toilet Transfer)  contact guard;verbal cues  -     Assistive Device (Toilet Transfer)  commode;grab bars/safety frame;walker, front-wheeled  -[facility] Name 21 09          Gait/Stairs (Locomotion)    [place] Level (Gait)  contact guard;verbal cues  -     Assistive Device (Gait)  walker, front-wheeled  -     Distance in Feet (Gait)  25 X 2  -[facility] Name             Wound 21 0950 Right anterior hip Incision    Wound - Properties Group Placement Date: 21   -AS Placement Time: 0950 -AS Side: Right  -AS Orientation: anterior  -AS Location: hip  -AS Primary Wound Type: Incision  -AS    Retired Wound - Properties Group Date first assessed: 01/14/21  -AS Time first assessed: 0950  -AS Side: Right  -AS Location: hip  -AS Primary Wound Type: Incision  -AS    Row Name 02/11/21 0907          Wound 02/09/21 1715 Right hip Incision    Wound - Properties Group Placement Date: 02/09/21  -SH Placement Time: 1715 -SH Present on Hospital Admission: Y  -SH Side: Right  -SH Location: hip  -SH Primary Wound Type: Incision  -SH    Dressing Appearance  intact  -AH     Wound Output (mL)  100 350 TOTAL  -AH     Retired Wound - Properties Group Date first assessed: 02/09/21  -SH Time first assessed: 1715  -SH Present on Hospital Admission: Y  -SH Side: Right  -SH Location: hip  -SH Primary Wound Type: Incision  -SH    Row Name 02/11/21 0907          NPWT (Negative Pressure Wound Therapy) 02/09/21 1741 right hip    NPWT (Negative Pressure Wound Therapy) - Properties Group Placement Date: 02/09/21  -SH Placement Time: 1741 -SH Location: right hip  -SH Additional Comments: 1 pc of black sponge  -SH    Therapy Setting  continuous therapy  -     Pressure Setting  125 mmHg  -AH     Retired NPWT (Negative Pressure Wound Therapy) - Properties Group Placement Date: 02/09/21  -SH Placement Time: 1741 -SH Location: right hip  -SH Additional Comments: 1 pc of black sponge  -SH    Row Name 02/11/21 0907          Plan of Care Review    Plan of Care Reviewed With  patient  -     Progress  improving  -     Outcome Summary  pt trans to EOB cga, sit-stand cga, pt amb 25 feet x 2 with rwx TTWB RLE, bathroom trans, wound vac dressing intact,  -AH     Row Name 02/11/21 0907          Positioning and Restraints    Pre-Treatment Position  in bed  -     Post Treatment Position  chair  -     In Chair  sitting;call light within reach;encouraged to call for assist;with family/caregiver;notified [facility]  -        User Key  (r) = Recorded By, (t) = Taken By, (c) = Cosigned By    Initials Name Provider Type     Olga Chambers PTA Physical Therapy Assistant    AS Miguelito Quintana, RN, BSN Registered Nurse    Laurie Ritter RN Registered Nurse        Physical Therapy Education                 Title: PT OT SLP Therapies (In Progress)     Topic: Physical Therapy (Done)     Point: Mobility training (Done)     Learning Progress Summary           Patient Acceptance, E,TB,D, VU,DU,NR by  at 2/10/2021 1604    Comment: Education re: purpose of PT/use of NPWT for wound mgmt, risks/benefits/precautions; education for TTWB R LE w/ gait using rwx; education to reinforce proper tech w/ bed mob/tfers/gait w/ rwx    Acceptance, E, VU by AB at 2/9/2021 0948    Comment: PT role in care, plan of care, d/c plan, R hip precautions                   Point: Home exercise program (Done)     Learning Progress Summary           Patient Acceptance, E, VU by AB at 2/9/2021 0948    Comment: PT role in care, plan of care, d/c plan, R hip precautions                   Point: Body mechanics (Done)     Learning Progress Summary           Patient Acceptance, E, VU by AB at 2/9/2021 0948    Comment: PT role in care, plan of care, d/c plan, R hip precautions                   Point: Precautions (Done)     Learning Progress Summary           Patient Acceptance, E,TB, VU by  at 2/11/2021 1003    Comment: TTWB RLE    Acceptance, E,TB,D, VU,DU,NR by  at 2/10/2021 1604    Comment: Education re: purpose of PT/use of NPWT for wound mgmt, risks/benefits/precautions; education for TTWB R LE w/ gait using rwx; education to reinforce proper tech w/ bed mob/tfers/gait w/ rwx    Acceptance, E, VU by AB at 2/9/2021 0948    Comment: PT role in care, plan of care, d/c plan, R hip precautions                               User Key     Initials Effective Dates Name Provider Type formerly [facility] 08/02/16 -  Olga Chambers PTA Physical Therapy Assistant PT     JE 08/02/18 -  Anupama De La Cruz, PT Physical Therapist PT    AB 12/02/20 -  Familia Ambriz, PT Student PT Student PT              PT Recommendation and Plan     Plan of Care Reviewed With: patient  Progress: improving  Outcome Summary: pt trans to EOB cga, sit-stand cga, pt amb 25 feet x 2 with rwx TTWB RLE, bathroom trans, wound vac dressing intact,  Outcome Measures     Row Name 02/09/21 0900             How much help from another is currently needed...    Putting on and taking off regular lower body clothing?  3 w/ AE   -AC (r) EP (t) AC (c)      Bathing (including washing, rinsing, and drying)  3  -AC (r) EP (t) AC (c)      Toileting (which includes using toilet bed pan or urinal)  3  -AC (r) EP (t) AC (c)      Putting on and taking off regular upper body clothing  4  -AC (r) EP (t) AC (c)      Taking care of personal grooming (such as brushing teeth)  4  -AC (r) EP (t) AC (c)      Eating meals  4  -AC (r) EP (t) AC (c)      AM-PAC 6 Clicks Score (OT)  21  -AC (r) EP (t)         Functional Assessment    Outcome Measure Options  AM-PAC 6 Clicks Daily Activity (OT)  -AC (r) EP (t) AC (c)        User Key  (r) = Recorded By, (t) = Taken By, (c) = Cosigned By    Initials Name Provider Type     Sebastián Howe, OTR/L, CNT Occupational Therapist    EP Ashley Lechuga, OT Student OT Student           Time Calculation:   PT Charges     Row Name 02/11/21 1003             Time Calculation    Start Time  0907  -      Stop Time  0947  -      Time Calculation (min)  40 min  -      PT Non-Billable Time (min)  10 min  -      PT Received On  02/11/21  -         Time Calculation- PT    Total Timed Code Minutes- PT  30 minute(s)  -         Timed Charges    32518 - Gait Training Minutes   30  -        User Key  (r) = Recorded By, (t) = Taken By, (c) = Cosigned By    Initials Name Provider Type     Olga Chambers, PTA Physical Therapy Assistant        Therapy Charges for Today     Code Description Service Date  Service Provider Modifiers Qty    [id number] HC GAIT TRAINING EA 15 MIN 2/11/2021 Olga Chambers, PTA GP 2          PT G-Codes  Outcome Measure Options: AM-PAC 6 Clicks Basic Mobility (PT)  AM-PAC 6 Clicks Score (PT): 17  AM-PAC 6 Clicks Score (OT): 21    Olga Chambers PTA  2/11/2021

## 2021-02-11 NOTE — DISCHARGE SUMMARY
"      [facility] Medicine Services  DISCHARGE SUMMARY       Date of Admission: 2/8/2021  Date of Discharge:  2/12/2021  Primary Care Physician: Davon Urrutia MD    Presenting Problem/History of Present Illness:  Suspected syncope and collapse    Final Discharge Diagnoses:  Active Hospital Problems    Diagnosis   • **Staphylococcus aureus bacteremia   • Right hip pain, suspected infection   • Obesity (BMI 30-39.9)   • Syncope, recurrent   • Leukocytosis   • Headache   • Elevated CPK   • Seropositive rheumatoid arthritis of multiple sites (CMS/[facility])     Overview:   -myochrysine '93-'96  -methotrexate '96--->11/98;r/s  restarted 2/99--> 8/14 (anemia)  -sulfasalazine- not effective  -penicillamine 6/98-->10/98; no effect  -leflunomide 11/98-->  - Humira '13-->didn't take  - Enbrel 12/14-->3/15- no effect!      Last Assessment & Plan:   - \"aching all over\" because she had to be off her anti-rheumatic drugs for 2 weeks in preparation for her R knee surgery  - her R knee surgery got cancelled because she developed cellulitis of her L arm (with fever). She was started on Bactrim, and then transitioned to doxycyline on Aug 1 2018 (3 week course) and her R knee surgery was postponed to 1 month from now.   - As such, she restarted her anti-rheumatics on Aug 1 2018.  - She will have to hold her anti-rheumatics again 2 weeks before her surgery  - She feels her medications are otherwise working well. No changes to be made to her current regimen, which is Humira injection q2 week, leflunomide (Arava), and salicylate     - Pt is concerned that when she transitions insurance in December of this year, she will lose her salicylate. We had a discussion and expressed that it would be best for her to stay on salicylate because we do not want the blood thinning effects of aspirin. Dr. Walden will plan to write a prior authorization for her for salicylate.     • Coronary artery disease     PCI RCA '10, " NSTEMI requiring LAD PCI in '13    [facility] 9/3/19 at [facility]: no significant dz; stents patent         Consults:   Orthopedics  Infectious disease     Procedures Performed:   1.   I&D of a right hip wound dehiscence and infection with evacuation of seroma  2.   Application of vacuum-assisted dressing to right hip wound dehiscence    Pertinent Test Results:   Imaging Results (Last 7 Days)     Procedure Component Value Units Date/Time    MRI Hip Right Without Contrast [[id number]] Collected: 02/09/21 1423     Updated: 02/09/21 1430    Narrative:      EXAMINATION: MRI HIP RIGHT WO CONTRAST-     2/9/2021 1:14 PM CST     HISTORY: Soft tissue infection suspected, hip, xray done  Postoperative hip evaluation.     Noncontrast MR imaging of the right hip.     Comparison is made with CT exam performed earlier today and MRI from  12/11/2020.     Postoperative changes with residual fluid collection lateral to the  right hip.     There is extensive edema within the right gluteus muscle.     Edema within the proximal right femur greater trochanter associated with  gluteus tendon reattachment.     No occult fracture.     Small amount of joint fluid.     Summary:  1. Extensive gluteus muscle edema.  No drainable gluteus muscle fluid collection is seen.  2. Moderate amount of incisional fluid.  This report was finalized on 02/09/2021 14:27 by Dr. Jesus Manuel Santos MD.    MRI Brain Without Contrast [[id number]] Collected: 02/09/21 1331     Updated: 02/09/21 1336    Narrative:      EXAMINATION: MRI BRAIN WO CONTRAST-     2/9/2021 12:50 PM CST     HISTORY: Syncope/fainting; R41.82-Altered mental status, unspecified;  R55-Syncope and collapse; Z74.09-Other reduced mobility     Noncontrast MR imaging of the brain.     No acute infarct.     No brain hemorrhage or abnormal calcification.     Ventricle size is appropriate.  Mild cortical atrophy.     Mild small vessel disease within the periventricular white matter.     Mucosal thickening throughout  all paranasal sinuses.  Bilateral mastoid fluid.     Summary:  1. Mild atrophy and small vessel disease.  2. Paranasal sinus inflammatory disease and mastoid fluid.  3. No mass or infarct.  This report was finalized on 02/09/2021 13:33 by Dr. Jesus Manuel Santos MD.    CT Lower Extremity Right Without Contrast [[id number]] Collected: 02/09/21 0844     Updated: 02/09/21 0852    Narrative:      CT LOWER EXTREMITY RIGHT WO CONTRAST- 2/9/2021 8:16 AM CST     HISTORY: Soft tissue infection suspected, hip, xray done; R41.82-Altered  mental status, unspecified; R55-Syncope and collapse      COMPARISON: Plain films 2/8/2021     DOSE LENGTH PRODUCT: 410 mGy cm. Automated exposure control was also  utilized to decrease patient radiation dose.     TECHNIQUE: Axial images of the right hip are obtained without IV  contrast. Sagittal coronal images reformatted.     FINDINGS:  There is straightening of the lateral subcutaneous tissues of  the right hip extending to the regional musculature with no organized  abscess collection. Enthesophytes project from the anterior iliac spine  and the greater trochanter. There are linear lucencies of the right  proximal femur within the intratrochanteric region which may represent  sites of hardware removal. There is mild hip joint space narrowing with  early bony spurring of the femoral head. No bony fracture or  dislocation. No radiographic evidence of avascular necrosis.     Postoperative changes of the lower lumbar spine. Mild arthritic change  of the right sacroiliac joint.     A normal-appearing appendix is identified. Vascular calcifications.  Calcifications within the uterus may represent small leiomyomas.       Impression:      1. There is stranding of the subcutaneous tissues of the lateral right  hip with no organized abscess collection.  2. Hardware removal suspected from the proximal right femur with no  acute osseous pathology.  This report was finalized on 02/09/2021 08:49 by   Trinity Gómez MD.    XR Hip With or Without Pelvis 2 - 3 View Right [[id number]] Collected: 02/08/21 2254     Updated: 02/08/21 2258    Narrative:      EXAMINATION:  XR HIP W OR WO PELVIS 2-3 VIEW RIGHT-  2/8/2021 8:25 PM  CST     HISTORY: drainage from incision, recent repair      COMPARISON: 10/22/2020 right hip radiographs     TECHNIQUE: 3 views: AP pelvis, AP and lateral right hip     FINDINGS:      The right femoral head is imaged appropriately within the acetabulum.  Contracted the femoral head is maintained without fracture.     The left hip joint is maintained.     The bony pelvis is intact without fracture.     Degenerative changes noted in the lower lumbar spine.       Impression:      1. No acute osseous abnormality.  This report was finalized on 02/08/2021 22:55 by Dr. Sterling Elizalde MD.    CT Lumbar Spine Without Contrast [[id number]] Collected: 02/08/21 2226     Updated: 02/08/21 2239    Narrative:      EXAMINATION:  CT LUMBAR SPINE WO CONTRAST-  2/8/2021 9:14 PM CST     HISTORY: ams, unwitnessed fall, pain      COMPARISON: Lumbar spine radiographs dated 11/7/2019     TECHNIQUE: Radiation dose equals  mGy-cm.  Automated exposure  control dose reduction technique was implemented.     Thin section axial imaging was obtained without intravenous contrast.  2-D sagittal and coronal reconstruction images were generated.     FINDINGS:      Extensive postsurgical changes from posterior and interbody fusion  identified at L3-L4 and L4-L5. The hardware is imaged appropriately  without screw fracture or hardware complications.     Large laminectomy defect identified at L4.     There is no CT evidence of acute fracture or subluxation.     There is disc degeneration at L2-L3 and L1-L2 with disc space narrowing  and vacuum phenomenon, endplate sclerosis and osteophyte formation.     There is broad-based partially calcified disc at L1-L2 with resulting  canal stenosis. There is facet arthropathy with bilateral  foraminal  stenosis particularly on the right     There is mild posterior listhesis of L2 on L3 with posterior osteophyte  and disc complex with relative canal and foraminal narrowing.       Impression:      1. No CT evidence of acute bony injury to the lumbar spine.  2. Extensive postsurgical changes.  3. Advanced disc degeneration and spondylosis at L2-L3 and L1-L2.  This report was finalized on 02/08/2021 22:36 by Dr. Sterling Elizalde MD.    CT Thoracic Spine Without Contrast [[id number]] Collected: 02/08/21 2224     Updated: 02/08/21 2229    Narrative:      EXAMINATION:  CT THORACIC SPINE WO CONTRAST-  2/8/2021 9:14 PM CST     HISTORY: ams, unwitnessed fall      COMPARISON: No comparison study.     TECHNIQUE: Radiation dose equals DLP 1/2/2002 mGy-cm.  Automated  exposure control dose reduction technique was implemented.     Thin section axial imaging was obtained without intravenous contrast.  2-D sagittal and coronal reconstruction images were generated.     FINDINGS: There is osteoporosis.     There are diffuse spondylosis changes. Bridging anterior osteophyte  formation observed. There is mild endplate irregularities with Schmorl's  node changes in the mid to lower thoracic spine.     There is no CT evidence of acute fracture or subluxation.     There is no significant canal stenosis. There is no CT evidence of  posttraumatic disc herniation.       Impression:      1. No CT evidence of acute bony injury to the thoracic spine.  This report was finalized on 02/08/2021 22:26 by Dr. Sterling Elizalde MD.    XR Chest 1 View [[id number]] Collected: 02/08/21 2202     Updated: 02/08/21 2206    Narrative:      EXAMINATION: XR CHEST 1 VW-. 2/8/2021 10:02 PM CST     CHEST, ONE VIEW:     HISTORY: Altered mental status     COMPARISON: 7/7/2014 chest radiography     A single frontal chest radiograph was obtained.     FINDINGS:     Permanent cardiac pacemaker identified.     Calcified granuloma noted in the right upper lobe.      Mild chronic interstitial prominence identified without parenchymal  infiltrates.     There are no pleural effusions or pneumothoraces.     The heart is normal in size without evidence of overt heart failure.     No acute osseous abnormalities identified.                                     Impression:      1. No acute cardiopulmonary process.     This report was finalized on 02/08/2021 22:03 by Dr. Sterling Elizalde MD.    CT Angiogram Chest [[id number]] Collected: 02/08/21 2153     Updated: 02/08/21 2204    Narrative:      EXAMINATION: CT ANGIOGRAM CHEST-  2/8/2021 9:53 PM CST     HISTORY:Chest pain post fall     COMPARISON: Current chest radiography     TECHNIQUE:     CT evaluation of the chest was performed with intravenous contrast. 2 mm  transaxial images were obtained.. Coronal reconstruction maximum  intensity projection images of the pulmonary arteries and aorta were  also generated.     Radiation dose equals  mGy-cm.  Automated exposure control dose  reduction technique was implemented.        FINDINGS:     [The pulmonary arteries opacify with iodinated contrast without  intraluminal filling defects. There is no CT angiographic evidence of  pulmonary embolus.     There is no aortic dissection or aneurysm.     There are no pneumothoraces or pleural effusions.     There are no areas of airspace consolidation.     Calcified granuloma noted in the right upper lobe.     There is mild reticular groundglass opacities appreciated in the right  upper lobe anteriorly, nonspecific. Acute infectious or inflammatory  change considered. Some mild pulmonary contusion would be difficult to  exclude, however less likely.     There is mild ground glass opacities posteriorly in the lower lobes  likely representing atelectasis associated with the level of  inspiration.     There is no mediastinal or hilar lymphadenopathy. There are calcified  right hilar lymph nodes.     Coronary artery calcifications observed. Permanent  cardiac pacemaker  leads identified.     Limited imaging the upper abdomen demonstrate no acute abnormality.     There is mild deformity of the right fourth rib anteriorly, some old  trauma questioned. Acute bony injury difficult to totally exclude,  however no discrete fracture line observed. Correlate with clinical  findings.     Spondylosis changes observed in the thoracic spine. No acute spine  fracture observed.]       Impression:      1. No CT angiographic evidence of pulmonary embolus or acute aortic  pathology.  2. Mild reticular groundglass reticular opacities in the right upper  lobe, etiology uncertain. Differential considerations includes mild  infectious/inflammatory changes. Mild pulmonary contusion as well as  chronic interstitial lung changes considered. Probable mild atelectasis  in the lower lobes. Lung fields are otherwise clear.  3. Mild deformity right anterior fourth rib, question old trauma.  Correlate with pedicle findings. No definite acute osseous abnormalities  observed otherwise.  This report was finalized on 02/08/2021 22:01 by Dr. Sterling Elizalde MD.    CT Cervical Spine Without Contrast [[id number]] Collected: 02/08/21 2145     Updated: 02/08/21 2153    Narrative:      EXAMINATION:  CT CERVICAL SPINE WO CONTRAST-  2/8/2021 9:14 PM CST     HISTORY: ams, unwitnessed fall      COMPARISON: No comparison study.     TECHNIQUE: Radiation dose equals  mGy-cm.  Automated exposure  control dose reduction technique was implemented.     Thin section axial imaging was obtained without intravenous contrast.  2-D sagittal and coronal reconstruction images were generated.     FINDINGS:      The facets are appropriately aligned.     The tibial body heights are maintained. The prevertebral soft tissues  are normal.     There is no CT evidence of acute fracture or subluxation.     There is advanced disc degeneration spondylosis C5-C6 and C6-C7 with  complete loss of disc space, 8 end plate sclerosis  and anterior  posterior osteophyte formation.     There is high-grade canal stenosis associated with right paracentral  osteophyte at C5-C6 with relative canal stenosis at C6-C7 left  paracentrally due to osteophyte formation.     There is moderate bilateral foraminal stenosis at these levels  particularly at C5-C6.     There are some degenerative changes at C7-T1 with anterior osteophyte  formation.     The remaining disc spaces are maintained without significant canal  foraminal stenosis.     There is no CT evidence of posttraumatic disc herniation.     There are carotid artery calcifications.     The upper lung fields are clear.     Sphenoid sinus ethmoid sinus opacification and spurring noted.       Impression:      1. No CT evidence of acute bony injury to the cervical spine.  2. Advanced disc degeneration and spondylosis C5-C6 and to a lesser  degree C6-C7 with resulting canal and foraminal stenosis.  This report was finalized on 02/08/2021 21:50 by Dr. Sterling Elizalde MD.    CT Head Without Contrast [[id number]] Collected: 02/08/21 2136     Updated: 02/08/21 2151    Narrative:      EXAMINATION: CT HEAD WO CONTRAST-  2/8/2021 9:36 PM CST     CT SCAN OF THE HEAD, WITHOUT CONTRAST:      HISTORY: Altered mental status, fall     COMPARISONS: 11/20/2014 head CT      TECHNIQUE:     Radiation dose equals  mGy-cm.  Automated exposure control dose  reduction technique was implemented.        CT evaluation of the head without intravenous contrast. 5 mm transaxial  images were obtained.   2-D sagittal and coronal reconstruction images  were generated.     FINDINGS:      There is no intra or extra-axial hemorrhage.     There is no mass, mass effect or midline shift.     There is no hydrocephalus     There is opacification of the sphenoid ethmoid sinuses.     There is no skull fracture or acute calvarial abnormality.             Impression:      1. No CT evidence of an acute intracranial process.  2. Extensive  paranasal sinus disease.           This report was finalized on 02/08/2021 21:38 by Dr. Sterling Elizalde MD.        Lab Results (last 7 days)     Procedure Component Value Units Date/Time    Tissue / Bone Culture - Tissue, Hip, Right [[id number]]  (Abnormal) Collected: 02/09/21 1745    Specimen: Tissue from Hip, Right Updated: 02/11/21 1112     Tissue Culture Light growth (2+) Staphylococcus aureus     Gram Stain Rare (1+) WBCs seen      Rare (1+) Gram positive cocci    Body Fluid Culture - Synovial Fluid, Hip, Right [[id number]]  (Abnormal) Collected: 02/09/21 0740    Specimen: Synovial Fluid from Hip, Right Updated: 02/11/21 0845     Body Fluid Culture Moderate growth (3+) Staphylococcus aureus     Gram Stain Many (4+) WBCs seen      Rare (1+) Gram positive cocci    Narrative:      4    Wound Culture - Swab, Hip, Right [[id number]]  (Abnormal)  (Susceptibility) Collected: 02/08/21 2018    Specimen: Swab from Hip, Right Updated: 02/11/21 0734     Wound Culture Moderate growth (3+) Escherichia coli      Light growth (2+) Staphylococcus aureus     Gram Stain Rare (1+) WBCs seen      Rare (1+) Gram negative bacilli    Susceptibility      Escherichia coli     LICHA     Ampicillin Resistant     Ampicillin + Sulbactam Resistant     Cefepime Susceptible     Ceftazidime Susceptible     Ceftriaxone Susceptible     Gentamicin Susceptible     Levofloxacin Susceptible     Piperacillin + Tazobactam Susceptible     Tetracycline Susceptible     Trimethoprim + Sulfamethoxazole Susceptible                Susceptibility      Staphylococcus aureus     LICHA     Clindamycin Resistant     Erythromycin Resistant     Inducible Clindamycin Resistance Negative     Oxacillin Susceptible     Penicillin G Resistant     Rifampin Susceptible     Tetracycline Resistant     Trimethoprim + Sulfamethoxazole Susceptible     Vancomycin Susceptible                Susceptibility Comments     Escherichia coli    Cefazolin sensitivity will not be reported for  Enterobacteriaceae in non-urine isolates. If cefazolin is preferred, please call the microbiology lab to request an E-test.  With the exception of urinary-sourced infections, aminoglycosides should not be used as monotherapy.             Sedimentation Rate [[id number]]  (Abnormal) Collected: 02/11/21 0613    Specimen: Blood Updated: 02/11/21 0707     Sed Rate 26 mm/hr     Basic Metabolic Panel [[id number]]  (Abnormal) Collected: 02/11/21 0613    Specimen: Blood Updated: 02/11/21 0705     Glucose 93 mg/dL      BUN 13 mg/dL      Creatinine 0.52 mg/dL      Sodium 136 mmol/L      Potassium 4.2 mmol/L      Chloride 105 mmol/L      CO2 27.0 mmol/L      Calcium 9.0 mg/dL      eGFR Non African Amer 118 mL/min/1.73      BUN/Creatinine Ratio 25.0     Anion Gap 4.0 mmol/L     Narrative:      GFR Normal >60  Chronic Kidney Disease <60  Kidney Failure <15      C-reactive Protein [[id number]]  (Abnormal) Collected: 02/11/21 0613    Specimen: Blood Updated: 02/11/21 0705     C-Reactive Protein 16.14 mg/dL     Blood Culture - Blood, Blood, Venous Line [[id number]]  (Abnormal) Collected: 02/09/21 1736    Specimen: Blood, Venous Line Updated: 02/11/21 0702     Blood Culture Staphylococcus aureus     Comment: Infectious disease consultation is highly recommended to rule out distant foci of infection.        Isolated from Aerobic Bottle     Gram Stain Aerobic Bottle Gram positive cocci in clusters    Narrative:      Refer to previous blood culture collected on 2/8/2021 2036 for MICs.      CBC & Differential [[id number]]  (Abnormal) Collected: 02/11/21 0613    Specimen: Blood Updated: 02/11/21 0647    Narrative:      The following orders were created for panel order CBC & Differential.  Procedure                               Abnormality         Status                     ---------                               -----------         ------                     CBC Auto Differential[[id number]]        Abnormal            Final result                  Please view results for these tests on the individual orders.    CBC Auto Differential [[id number]]  (Abnormal) Collected: 02/11/21 0613    Specimen: Blood Updated: 02/11/21 0647     WBC 4.85 10*3/mm3      RBC 2.83 10*6/mm3      Hemoglobin 8.4 g/dL      Hematocrit 26.0 %      MCV 91.9 fL      MCH 29.7 pg      MCHC 32.3 g/dL      RDW 15.1 %      RDW-SD 50.8 fl      MPV 11.0 fL      Platelets 143 10*3/mm3      Neutrophil % 48.4 %      Lymphocyte % 30.5 %      Monocyte % 14.6 %      Eosinophil % 4.9 %      Basophil % 1.0 %      Immature Grans % 0.6 %      Neutrophils, Absolute 2.34 10*3/mm3      Lymphocytes, Absolute 1.48 10*3/mm3      Monocytes, Absolute 0.71 10*3/mm3      Eosinophils, Absolute 0.24 10*3/mm3      Basophils, Absolute 0.05 10*3/mm3      Immature Grans, Absolute 0.03 10*3/mm3      nRBC 0.0 /100 WBC     Blood Culture - Blood, Wrist, Left [[id number]]  (Abnormal) Collected: 02/08/21 1946    Specimen: Blood from Wrist, Left Updated: 02/11/21 0626     Blood Culture Staphylococcus aureus     Comment: Infectious disease consultation is highly recommended to rule out distant foci of infection.        Isolated from Aerobic and Anaerobic Bottles     Gram Stain Aerobic Bottle Gram positive cocci      Anaerobic Bottle Gram positive cocci    Narrative:      Refer to previous blood culture collected on  2/8/2021 at 2036 for MICS.     Blood Culture - Blood, Arm, Right [[id number]]  (Abnormal)  (Susceptibility) Collected: 02/08/21 1936    Specimen: Blood from Arm, Right Updated: 02/11/21 0626     Blood Culture Staphylococcus aureus     Comment: Infectious disease consultation is highly recommended to rule out distant foci of infection.        Isolated from Aerobic and Anaerobic Bottles     Gram Stain Aerobic Bottle Gram positive cocci in clusters      Anaerobic Bottle Gram positive cocci in clusters    Susceptibility      Staphylococcus aureus     LICHA     Gentamicin Susceptible     Oxacillin Susceptible     Rifampin  Susceptible     Vancomycin Susceptible                    Blood Culture With BREONNA - Blood, Arm, Left [[id number]]  (Abnormal) Collected: 02/10/21 0445    Specimen: Blood from Arm, Left Updated: 02/11/21 0511     Blood Culture Abnormal Stain     Gram Stain Aerobic Bottle Gram positive cocci    Blood Culture With BREONNA - Blood, Arm, Right [[id number]]  (Abnormal) Collected: 02/10/21 0445    Specimen: Blood from Arm, Right Updated: 02/11/21 0511     Blood Culture Abnormal Stain     Gram Stain Aerobic Bottle Gram positive cocci    Vancomycin, Trough [[id number]]  (Normal) Collected: 02/10/21 1903    Specimen: Blood Updated: 02/10/21 1929     Vancomycin Trough 11.60 mcg/mL     Comprehensive Metabolic Panel [[id number]]  (Abnormal) Collected: 02/10/21 0445    Specimen: Blood Updated: 02/10/21 0608     Glucose 83 mg/dL      BUN 17 mg/dL      Creatinine 0.59 mg/dL      Sodium 135 mmol/L      Potassium 3.8 mmol/L      Chloride 103 mmol/L      CO2 24.0 mmol/L      Calcium 8.7 mg/dL      Total Protein 5.7 g/dL      Albumin 2.50 g/dL      ALT (SGPT) 17 U/L      AST (SGOT) 77 U/L      Alkaline Phosphatase 69 U/L      Total Bilirubin 0.7 mg/dL      eGFR Non African Amer 102 mL/min/1.73      Globulin 3.2 gm/dL      A/G Ratio 0.8 g/dL      BUN/Creatinine Ratio 28.8     Anion Gap 8.0 mmol/L     Narrative:      GFR Normal >60  Chronic Kidney Disease <60  Kidney Failure <15      CBC (No Diff) [[id number]]  (Abnormal) Collected: 02/10/21 0445    Specimen: Blood Updated: 02/10/21 0541     WBC 7.46 10*3/mm3      RBC 3.15 10*6/mm3      Hemoglobin 9.1 g/dL      Hematocrit 29.0 %      MCV 92.1 fL      MCH 28.9 pg      MCHC 31.4 g/dL      RDW 15.0 %      RDW-SD 51.0 fl      MPV 10.8 fL      Platelets 136 10*3/mm3     Blood Culture ID, PCR - Blood, Arm, Right [[id number]]  (Abnormal) Collected: 02/08/21 1936    Specimen: Blood from Arm, Right Updated: 02/09/21 1643     BCID, PCR Staphylococcus aureus. Identification by BCID PCR.    Lipid Panel  [[id number]]  (Abnormal) Collected: 02/09/21 0426    Specimen: Blood Updated: 02/09/21 0556     Total Cholesterol 129 mg/dL      Triglycerides 108 mg/dL      HDL Cholesterol 36 mg/dL      LDL Cholesterol  73 mg/dL      VLDL Cholesterol 20 mg/dL      LDL/HDL Ratio 1.98    Narrative:      Cholesterol Reference Ranges  ([place] Department of Health and Human Services ATP III Classifications)    Desirable          <200 mg/dL  Borderline High    200-239 mg/dL  High Risk          >240 mg/dL      Triglyceride Reference Ranges  ([place] Department of Health and Human Services ATP III Classifications)    Normal           <150 mg/dL  Borderline High  150-199 mg/dL  High             200-499 mg/dL  Very High        >500 mg/dL    HDL Reference Ranges  (U.S. Department of Health and Human Services ATP III Classifcations)    Low     <40 mg/dl (major risk factor for CHD)  High    >60 mg/dl ('negative' risk factor for CHD)        LDL Reference Ranges  (U.S. Department of Health and Human Services ATP III Classifcations)    Optimal          <100 mg/dL  Near Optimal     100-129 mg/dL  Borderline High  130-159 mg/dL  High             160-189 mg/dL  Very High        >189 mg/dL    Hemoglobin A1c [[id number]]  (Normal) Collected: 02/09/21 0426    Specimen: Blood Updated: 02/09/21 0535     Hemoglobin A1C 5.50 %     Narrative:      Hemoglobin A1C Ranges:    Increased Risk for Diabetes  5.7% to 6.4%  Diabetes                     >= 6.5%  Diabetic Goal                < 7.0%    Basic Metabolic Panel [[id number]]  (Abnormal) Collected: 02/09/21 0426    Specimen: Blood Updated: 02/09/21 0535     Glucose 109 mg/dL      BUN 16 mg/dL      Creatinine 0.66 mg/dL      Sodium 137 mmol/L      Potassium 3.6 mmol/L      Chloride 105 mmol/L      CO2 24.0 mmol/L      Calcium 8.7 mg/dL      eGFR Non African Amer 89 mL/min/1.73      BUN/Creatinine Ratio 24.2     Anion Gap 8.0 mmol/L     Narrative:      GFR Normal >60  Chronic Kidney Disease <60  Kidney Failure <15       C-reactive Protein [[id number]]  (Abnormal) Collected: 02/09/21 0426    Specimen: Blood Updated: 02/09/21 0535     C-Reactive Protein 15.58 mg/dL     Sedimentation Rate [[id number]]  (Abnormal) Collected: 02/09/21 0426    Specimen: Blood Updated: 02/09/21 0518     Sed Rate 21 mm/hr     CBC (No Diff) [[id number]]  (Abnormal) Collected: 02/09/21 0426    Specimen: Blood Updated: 02/09/21 0507     WBC 10.50 10*3/mm3      RBC 3.65 10*6/mm3      Hemoglobin 10.6 g/dL      Hematocrit 33.5 %      MCV 91.8 fL      MCH 29.0 pg      MCHC 31.6 g/dL      RDW 15.3 %      RDW-SD 51.0 fl      MPV 11.3 fL      Platelets 166 10*3/mm3     Urinalysis With Culture If Indicated - Urine, Catheter [[id number]]  (Abnormal) Collected: 02/08/21 2058    Specimen: Urine, Catheter Updated: 02/08/21 2125     Color, UA Yellow     Appearance, UA Clear     pH, UA 5.5     Specific Gravity, UA 1.020     Glucose, UA Negative     Ketones, UA 15 mg/dL (1+)     Bilirubin, UA Negative     Blood, UA Negative     Protein, UA Trace     Leuk Esterase, UA Negative     Nitrite, UA Negative     Urobilinogen, UA 0.2 E.U./dL    Narrative:      Urine microscopic not indicated.    Golden Draw [[id number]] Collected: 02/08/21 1936    Specimen: Blood Updated: 02/08/21 2101    Narrative:      The following orders were created for panel order Golden Draw.  Procedure                               Abnormality         Status                     ---------                               -----------         ------                     Light Blue Top[[id number]]                                   Final result               Green Top (Gel)[[id number]]                                  Final result               Lavender Top[[id number]]                                     Final result               Red Top[[id number]]                                          Final result               Golden Blood Culture Jacob...[[id number]]                      Final result               Velarde Top -  Ice[[id number]]                                   Final result                 Please view results for these tests on the individual orders.    Lyndora Blood Culture Bottle Set [[id number]] Collected: 02/08/21 1946    Specimen: Blood from Wrist, Left Updated: 02/08/21 2101     Extra Tube Hold for add-ons.     Comment: Auto resulted.       Light Blue Top [[id number]] Collected: 02/08/21 1936    Specimen: Blood from Arm, Right Updated: 02/08/21 2046     Extra Tube hold for add-on     Comment: Auto resulted       Green Top (Gel) [[id number]] Collected: 02/08/21 1936    Specimen: Blood from Arm, Right Updated: 02/08/21 2046     Extra Tube Hold for add-ons.     Comment: Auto resulted.       Lavender Top [[id number]] Collected: 02/08/21 1936    Specimen: Blood from Arm, Right Updated: 02/08/21 2046     Extra Tube hold for add-on     Comment: Auto resulted       Red Top [[id number]] Collected: 02/08/21 1936    Specimen: Blood from Arm, Right Updated: 02/08/21 2046     Extra Tube Hold for add-ons.     Comment: Auto resulted.       Gray Top - Ice [[id number]] Collected: 02/08/21 1936    Specimen: Blood from Arm, Right Updated: 02/08/21 2046     Extra Tube Hold for add-ons.     Comment: Auto resulted.       COVID-19, [facility] IN-HOUSE,NASAL Swab (NO TRANSPORT MEDIA) 2 HR TAT - Swab, Nasopharynx [[id number]]  (Normal) Collected: 02/08/21 1945    Specimen: Swab from Nasopharynx Updated: 02/08/21 2038     COVID19 Presumptive Negative    Narrative:      Fact sheet for providers: https://www.fda.gov/media/557234/download     Fact sheet for patients: https://www.fda.gov/media/726029/download    Test performed by PCR.  If inconsistent with clinical signs and symptoms patient should be tested with different authorized molecular test.    D-dimer, Quantitative [[id number]]  (Abnormal) Collected: 02/08/21 1936    Specimen: Blood from Arm, Right Updated: 02/08/21 2033     D-Dimer, Quantitative 13.53 mg/L (FEU)     Narrative:      Reference Range is  "0-0.50 mg/L FEU. However, results <0.50 mg/L FEU tends to rule out DVT or PE. Results >0.50 mg/L FEU are not useful in predicting absence or presence of DVT or PE.      CK [[id number]]  (Abnormal) Collected: 02/08/21 1936    Specimen: Blood from Arm, Right Updated: 02/08/21 2025     Creatine Kinase 324 U/L     Procalcitonin [[id number]]  (Abnormal) Collected: 02/08/21 1936    Specimen: Blood from Arm, Right Updated: 02/08/21 2012     Procalcitonin 0.86 ng/mL     Narrative:      As a Marker for Sepsis (Non-Neonates):   1. <0.5 ng/mL represents a low risk of severe sepsis and/or septic shock.  1. >2 ng/mL represents a high risk of severe sepsis and/or septic shock.    As a Marker for Lower Respiratory Tract Infections that require antibiotic therapy:  PCT on Admission     Antibiotic Therapy             6-12 Hrs later  > 0.5                Strongly Recommended            >0.25 - <0.5         Recommended  0.1 - 0.25           Discouraged                   Remeasure/reassess PCT  <0.1                 Strongly Discouraged          Remeasure/reassess PCT      As 28 day mortality risk marker: \"Change in Procalcitonin Result\" (> 80 % or <=80 %) if Day 0 (or Day 1) and Day 4 values are available. Refer to http://www.[employer]-pct-calculator.com/   Change in PCT <=80 %   A decrease of PCT levels below or equal to 80 % defines a positive change in PCT test result representing a higher risk for 28-day all-cause mortality of patients diagnosed with severe sepsis or septic shock.  Change in PCT > 80 %   A decrease of PCT levels of more than 80 % defines a negative change in PCT result representing a lower risk for 28-day all-cause mortality of patients diagnosed with severe sepsis or septic shock.                Results may be falsely decreased if patient taking Biotin.     Comprehensive Metabolic Panel [[id number]]  (Abnormal) Collected: 02/08/21 1936    Specimen: Blood from Arm, Right Updated: 02/08/21 2006     Glucose 114 mg/dL      " BUN 17 mg/dL      Creatinine 0.81 mg/dL      Sodium 136 mmol/L      Potassium 4.0 mmol/L      Chloride 98 mmol/L      CO2 28.0 mmol/L      Calcium 9.3 mg/dL      Total Protein 6.7 g/dL      Albumin 3.60 g/dL      ALT (SGPT) 13 U/L      AST (SGOT) 25 U/L      Alkaline Phosphatase 91 U/L      Total Bilirubin 0.7 mg/dL      eGFR Non African Amer 71 mL/min/1.73      Globulin 3.1 gm/dL      A/G Ratio 1.2 g/dL      BUN/Creatinine Ratio 21.0     Anion Gap 10.0 mmol/L     Narrative:      GFR Normal >60  Chronic Kidney Disease <60  Kidney Failure <15      Lactic Acid, Plasma [[id number]]  (Normal) Collected: 02/08/21 1936    Specimen: Blood from Arm, Right Updated: 02/08/21 2000     Lactate 1.2 mmol/L     CBC & Differential [[id number]]  (Abnormal) Collected: 02/08/21 1936    Specimen: Blood from Arm, Right Updated: 02/08/21 1947    Narrative:      The following orders were created for panel order CBC & Differential.  Procedure                               Abnormality         Status                     ---------                               -----------         ------                     CBC Auto Differential[[id number]]        Abnormal            Final result                 Please view results for these tests on the individual orders.    CBC Auto Differential [[id number]]  (Abnormal) Collected: 02/08/21 1936    Specimen: Blood from Arm, Right Updated: 02/08/21 1947     WBC 11.33 10*3/mm3      RBC 4.16 10*6/mm3      Hemoglobin 12.4 g/dL      Hematocrit 37.1 %      MCV 89.2 fL      MCH 29.8 pg      MCHC 33.4 g/dL      RDW 15.5 %      RDW-SD 50.1 fl      MPV 10.6 fL      Platelets 191 10*3/mm3      Neutrophil % 75.8 %      Lymphocyte % 12.6 %      Monocyte % 10.4 %      Eosinophil % 0.3 %      Basophil % 0.4 %      Immature Grans % 0.5 %      Neutrophils, Absolute 8.58 10*3/mm3      Lymphocytes, Absolute 1.43 10*3/mm3      Monocytes, Absolute 1.18 10*3/mm3      Eosinophils, Absolute 0.03 10*3/mm3      Basophils, Absolute 0.05  "10*3/mm3      Immature Grans, Absolute 0.06 10*3/mm3      nRBC 0.0 /100 WBC           HPI 2/8/2021:  Per Dr. Paul:  \"Tawny Shin is a 65-year-old female with a vast medical history to include rheumatoid arthritis, coronary artery disease, hypertension, recent labral tear of right hip joint with repair 1/14/2021 per Dr. Carlson, please see below for complete list.  Patient received injection to treat RA today and passed out during procedure.  Her son was in attendance, Dr. Gee Shin -neurologist.  He felt she had a vasovagal episode and took her home. She has incision to the right hip that is draining, he change dressing.  Later in the day her sister stopped by to check on her and found her in the floor, unknown length of time.  She was described as being semiconscious.  Dressing to right hip was again saturated.  EMS was called and patient was transferred to [facility] emergency department.  Work-up has been unremarkable.  She does have slightly evaded CPK and an elevated D-dimer that is both felt to be secondary to fall/unknown length of time down.  Patient currently has no memory of fall or dizziness.  She is complaining of a severe headache.  She states this is new since she arrived to the hospital.  She has mild nausea.  During assessment she drifts off to sleep between questions and during assessment.  Dressing on right hip has been changed, I did not remove at this time.  Culture obtained.  Vital signs are stable patient is admitted for further evaluation treatment.\"    Hospital Course:    Patient was admitted on 2/8 by Dr. Paul with syncope and collapse.  Patient has a long history of syncope.      In regards to her syncope, pacemaker was interrogated, and reportedly no arrhythmias or abnormalities were noted.  MRI of the brain on 2/9 showed no acute process.    Patient was also noted to have some purulence on her right hip from which she just had a gluteal repair surgery with Dr. Carlson.  " Patient was started on broad-spectrum antibiotics with vancomycin and Azactam.  Patient was taken to the OR on 2/9 for debridement and incision and drainage.  After the surgery, the patient was left with a 20 cm x 6 cm wound, which is a wound VAC was placed.  Wound care per orthopedic surgery.    Blood cultures from 2/8 very quickly grew gram-positive cocci, which turned out to be staph aureus in all bottles.  As of 2/10, blood cultures are still remaining positive.  Patient was also noted to have E. coli on her wound culture, this to be covered with the Azactam per infectious disease.  Blood cultures on 2/11 are negative thus far.  Blood cultures also collected on 2/12.  Recommend PICC line once the patient has had negative cultures for 48 hours or greater.        Patient also noted to have rheumatoid arthritis, per infectious disease, we are going to hold her cimzia for now.      Cardiology contacted to perform ESTEFANI which showed,   Results for orders placed during the hospital encounter of 02/08/21   Adult Transthoracic Echo Complete W/ Cont if Necessary Per Protocol    Narrative · Estimated left ventricular EF = 65% Left ventricular systolic function   is normal.  · Normal right ventricular cavity size and systolic function noted.  · All valves appear structurally and functionally normal with no evidence   of any hemodynamically significant disease.  · There is no evidence of right to left shunt on bubble study.  · There is no evidence of intracardiac mass or thrombus.               Patient is due to have her COVID-19 vaccine, second dose, on 2/13.  Discussed with infectious disease who would like for her to clear her bacteremia prior to receiving the vaccine.        Patient had wound vac changed today prior to leaving our facility per family and patient request.  Next change will be due on 2/15 (Monday).  Recommend giving 1 mg dilaudid IV prior to dressing change.                  Physical Exam on Discharge:  BP  "129/52 (BP Location: Left arm, Patient Position: Sitting)   Pulse 67   Temp 98.2 °F (36.8 °C) (Oral)   Resp 18   Ht 170.2 cm (67.01\")   Wt 94.8 kg (209 lb)   SpO2 95%   BMI 32.73 kg/m²   Physical Exam  Vitals signs and nursing note reviewed.   Constitutional:       Appearance: Normal appearance.   HENT:      Head: Normocephalic and atraumatic.      Nose: No congestion or rhinorrhea.      Mouth/Throat:      Mouth: Mucous membranes are moist.      Pharynx: Oropharynx is clear.   Eyes:      General: No scleral icterus.     Conjunctiva/sclera: Conjunctivae normal.   Cardiovascular:      Rate and Rhythm: Normal rate and regular rhythm.   Pulmonary:      Effort: Pulmonary effort is normal. No respiratory distress.      Breath sounds: Normal breath sounds. No stridor.   Abdominal:      General: Abdomen is flat. Bowel sounds are normal. There is no distension.      Palpations: Abdomen is soft. There is no mass.      Tenderness: There is no abdominal tenderness.      Hernia: No hernia is present.   Skin:     General: Skin is warm and dry.      Coloration: Skin is pale. Skin is not jaundiced.   Neurological:      General: No focal deficit present.      Mental Status: She is alert and oriented to person, place, and time.   Psychiatric:         Mood and Affect: Mood normal.         Behavior: Behavior normal.            Condition on Discharge: Stable     Discharge Disposition:  Long Term Care (DC - External)    Discharge Medications:     Discharge Medications      New Medications      Instructions Start Date   ceFAZolin in 0.9% normal saline 2 GM/ 100 mL solution IVPB  Commonly known as: ANCEF   2 g, Intravenous, Every 8 Hours      HYDROcodone-acetaminophen 7.5-325 MG per tablet  Commonly known as: NORCO   1 tablet, Oral, Every 4 Hours PRN      HYDROmorphone 1 MG/ML solution injection  Commonly known as: DILAUDID   1 mg, Intravenous, Daily PRN      morphine 4 MG/ML injection   4 mg, Intravenous, Every 4 Hours PRN    "   ondansetron 4 MG tablet  Commonly known as: ZOFRAN   4 mg, Oral, Every 6 Hours PRN      traMADol 50 MG tablet  Commonly known as: ULTRAM   50 mg, Oral, Every 6 Hours PRN         Changes to Medications      Instructions Start Date   furosemide 40 MG tablet  Commonly known as: LASIX  What changed: Another medication with the same name was removed. Continue taking this medication, and follow the directions you see here.   40 mg, Oral, Daily      predniSONE 1 MG tablet  Commonly known as: DELTASONE  What changed:   · medication strength  · how much to take  · Another medication with the same name was removed. Continue taking this medication, and follow the directions you see here.   2 mg, Oral, Daily   Start Date: February 13, 2021        Continue These Medications      Instructions Start Date   aspirin 81 MG EC tablet   81 mg, Oral, Daily      carvedilol 12.5 MG tablet  Commonly known as: COREG   12.5 mg, Oral, 2 Times Daily With Meals      clopidogrel 75 MG tablet  Commonly known as: PLAVIX   75 mg, Oral, Daily      folic acid 1 MG tablet  Commonly known as: FOLVITE   TAKE 1 TABLET BY MOUTH DAILY      guaiFENesin 600 MG 12 hr tablet  Commonly known as: MUCINEX   Oral      isosorbide mononitrate 60 MG 24 hr tablet  Commonly known as: IMDUR   60 mg, Oral, 2 Times Daily      levothyroxine 75 MCG tablet  Commonly known as: SYNTHROID, LEVOTHROID   Daily      multivitamin tablet tablet  Commonly known as: THERAGRAN   Take 1 tablet by mouth daily.        Nitrostat 0.4 MG SL tablet  Generic drug: nitroglycerin   Place 1 tablet under the tongue every 5 minutes as needed for Chest pain      pantoprazole 40 MG EC tablet  Commonly known as: PROTONIX   TAKE 1 TABLET BY MOUTH DAILY      salsalate 500 MG tablet  Commonly known as: DISALCID   TAKE 5 TABLETS ON MONDAY, WEDNESDAY, FRIDAY, AND SATURDAY, AND TAKE 4 TABLETS ON TUESDAYS, THURSDAYS, AND SUNDAYS      vitamin D 1.25 MG (93493 UT) capsule capsule  Commonly known as:  ERGOCALCIFEROL   50,000 Units, Oral, Every 7 Days         Stop These Medications    Cimzia 2 X 200 MG kit  Generic drug: Certolizumab Pegol     Cimzia Prefilled 2 X 200 MG/ML kit injection  Generic drug: certolizumab pegol     cloNIDine 0.1 MG tablet  Commonly known as: CATAPRES     Evolocumab solution auto-injector SureClick injection  Commonly known as: REPATHA     Evolocumab solution prefilled syringe injection  Commonly known as: REPATHA     ezetimibe 10 MG tablet  Commonly known as: ZETIA     spironolactone 25 MG tablet  Commonly known as: ALDACTONE     spironolactone 50 MG tablet  Commonly known as: ALDACTONE     traMADol-acetaminophen 37.5-325 MG per tablet  Commonly known as: ULTRACET          Discharge Diet:   Diet Instructions     Diet: Cardiac; Thin      Discharge Diet: Cardiac    Fluid Consistency: Thin        Activity at Discharge:   Activity Instructions     Activity as Tolerated            Follow-up Appointments:   Future Appointments   Date Time Provider Department Center   2/13/2021 11:00 AM C19 VACCINE CLIN PAD 2ND DOSE BH PAD C19VC PAD   3/15/2021 10:00 AM PAD BIC DEXA 1 BH PAD DX BI PAD   6/28/2021 10:00 AM Davon Urrutia MD [facility] VSQ PAD   7/7/2021  9:15 AM [facility] GR[facility]   1/13/2022 11:15 AM PACEMAKER HEART GRP [facility]       Test Results Pending at Discharge: Cultures    Electronically signed by Moises Oliveira MD, 02/12/21, 13:23 CST.    Time: 40 minutes.

## 2021-02-11 NOTE — PROGRESS NOTES
[facility] Medicine Services  INPATIENT PROGRESS NOTE    Patient Name: Tawny Shin  Date of Admission: 2/8/2021  Today's Date: 02/10/21  Length of Stay: 1  Primary Care Physician: Davon Urrutia MD    Subjective   Chief Complaint: right hip wound  HPI     Patient seen and examined at bedside.    Patient went to the OR yesterday, and had extensive debridement of her wound.  Patient now has a 20 cm x 6 cm open wound on her right hip, status post this resection on 2/9 with Dr. Carlson.  Wound VAC is in place.  Patient reports significant pain with wound VAC change.  Blood cultures were again positive.  Her cultures from the wound showed gram-positive and gram-negative.  Family is interested in LTAC placement.        Review of Systems   Constitutional: Positive for activity change. Negative for appetite change, chills, fatigue and fever.   Respiratory: Negative for cough and shortness of breath.    Cardiovascular: Negative for chest pain and palpitations.   Gastrointestinal: Negative for abdominal distention, abdominal pain, constipation, diarrhea and vomiting.   Musculoskeletal: Positive for arthralgias and myalgias.        All pertinent negatives and positives are as above. All other systems have been reviewed and are negative unless otherwise stated.     Objective    Temp:  [97.9 °F (36.6 °C)-99.7 °F (37.6 °C)] 98.9 °F (37.2 °C)  Heart Rate:  [] 77  Resp:  [12-18] 18  BP: (100-170)/(47-88) 153/57  Physical Exam  Vitals signs reviewed.   Constitutional:       Appearance: Normal appearance.   HENT:      Head: Normocephalic and atraumatic.      Mouth/Throat:      Mouth: Mucous membranes are moist.      Pharynx: Oropharynx is clear.   Eyes:      General: No scleral icterus.     Conjunctiva/sclera: Conjunctivae normal.   Cardiovascular:      Rate and Rhythm: Normal rate and regular rhythm.   Abdominal:      General: Abdomen is flat. Bowel sounds are normal. There is no  distension.      Palpations: Abdomen is soft. There is no mass.      Tenderness: There is no abdominal tenderness.      Hernia: No hernia is present.   Skin:     General: Skin is warm and dry.      Coloration: Skin is pale.      Comments: Right sided wound vac adequate seal   Neurological:      General: No focal deficit present.      Mental Status: She is alert and oriented to person, place, and time.   Psychiatric:         Mood and Affect: Mood normal.         Behavior: Behavior normal.             Results Review:  I have reviewed the labs, radiology results, and diagnostic studies.    Laboratory Data:   Results from last 7 days   Lab Units 02/10/21  0445 02/09/21  0426 02/08/21  1936   WBC 10*3/mm3 7.46 10.50 11.33*   HEMOGLOBIN g/dL 9.1* 10.6* 12.4   HEMATOCRIT % 29.0* 33.5* 37.1   PLATELETS 10*3/mm3 136* 166 191        Results from last 7 days   Lab Units 02/10/21  0445 02/09/21  0426 02/08/21  1936   SODIUM mmol/L 135* 137 136   POTASSIUM mmol/L 3.8 3.6 4.0   CHLORIDE mmol/L 103 105 98   CO2 mmol/L 24.0 24.0 28.0   BUN mg/dL 17 16 17   CREATININE mg/dL 0.59 0.66 0.81   CALCIUM mg/dL 8.7 8.7 9.3   BILIRUBIN mg/dL 0.7  --  0.7   ALK PHOS U/L 69  --  91   ALT (SGPT) U/L 17  --  13   AST (SGOT) U/L 77*  --  25   GLUCOSE mg/dL 83 109* 114*       Culture Data:   Blood Culture   Date Value Ref Range Status   02/08/2021 Abnormal Stain (C)  Preliminary   02/08/2021 Abnormal Stain (C)  Preliminary     Wound Culture   Date Value Ref Range Status   02/08/2021 Moderate growth (3+) Gram Negative Bacilli (A)  Preliminary       Radiology Data:   Imaging Results (Last 24 Hours)     ** No results found for the last 24 hours. **          I have reviewed the patient's current medications.     Assessment/Plan     Active Hospital Problems    Diagnosis   • **Staphylococcus aureus bacteremia   • Right hip pain, suspected infection   • Obesity (BMI 30-39.9)   • Syncope, recurrent   • Leukocytosis   • Headache   • Elevated CPK   •  "Seropositive rheumatoid arthritis of multiple sites (CMS/[facility])     Overview:   -myochrysine '93-'96  -methotrexate '96--->11/98;r/s  restarted 2/99--> 8/14 (anemia)  -sulfasalazine- not effective  -penicillamine 6/98-->10/98; no effect  -leflunomide 11/98-->  - Humira '13-->didn't take  - Enbrel 12/14-->3/15- no effect!      Last Assessment & Plan:   - \"aching all over\" because she had to be off her anti-rheumatic drugs for 2 weeks in preparation for her R knee surgery  - her R knee surgery got cancelled because she developed cellulitis of her L arm (with fever). She was started on Bactrim, and then transitioned to doxycyline on Aug 1 2018 (3 week course) and her R knee surgery was postponed to 1 month from now.   - As such, she restarted her anti-rheumatics on Aug 1 2018.  - She will have to hold her anti-rheumatics again 2 weeks before her surgery  - She feels her medications are otherwise working well. No changes to be made to her current regimen, which is Humira injection q2 week, leflunomide (Arava), and salicylate     - Pt is concerned that when she transitions insurance in December of this year, she will lose her salicylate. We had a discussion and expressed that it would be best for her to stay on salicylate because we do not want the blood thinning effects of aspirin. Dr. Walden will plan to write a prior authorization for her for salicylate.     • Coronary artery disease     PCI RCA '10, NSTEMI requiring LAD PCI in '13    [facility] 9/3/19 at [facility]: no significant dz; stents patent       Plan:  1.  Patient admitted on 2/8 by Dr. Paul with syncope and collapse. Patient was found to have suspect right hip infection from recent gluteal repair.  2.  Orthopedics consult, appreciate assistance.  Dr. Carlson took patient to OR on 2/9 for debridement and incision/drainage.  Patient has a 20cm x 6 cm wound, with wound vac.    3.  Blood cultures from 2/8 came back positive for staph aureus in 4/4 bottles.  BC remain " positive on 2/9.  Wound cultures on 2/8 show e coli and staph aureus. Awaiting sensitivities on the staph  4.  Infectious disease consulted, discussed case with Dr. Copeland.  Continue vancomycin and aztreonam for now.  5.  Continue home medications as appropriate  6.  Pacemaker interrogated, no abnormalities per nursing, awaiting full report  7.  MRI brain without contrast (2/9) shows no acute process  8.  Per family, patient has had recurrent syncope, this is not a new finding.  9.  Wound care per orthopedics   10.  PT/OT  11.  Morphine 4 mg q4h PRN, norco 7.5 mg q4h PRN, dilaudid 1 mg IV with dressing changes   12.  SW consult for LTACH placement            Discharge Planning: I expect the patient to be discharged to LTACH.    Electronically signed by Moises Oliveira MD, 02/10/21, 18:07 CST.

## 2021-02-11 NOTE — PROGRESS NOTES
"Pharmacy Dosing Service  Antimicrobial  Vancomycin Follow-up Evaluation    Assessment/Action/Plan:  Current dosage: Vancomycin 1000 mg IVPB every 12 hours  Current end date: 3/9/21   Level(s): 02/10/21 trough = 11.6 mcg/mL, ~10 hours post-dose 1000 mg  Other antimicrobial agent(s): aztreonam    Dosage adjusted based on AUC model data and plan for 4 weeks of treatment: 1750 mg IV every 24 hours starting 02/11/21 @09:00, ~12 hours after previous dose of 1000 mg IV.    Pharmacy will continue to follow daily.     Subjective:  Tawny Shin is a 65 y.o. female currently receiving Vancomcyin for the treatment of SSTI/bacteremia.    AUC Model Data:  Regimen: 1750 mg every 24 hours for 4 doses.  Exposure target: AUC24 (range)400-600 mg/L.hr  AUC24,ss: 465 mg/L.hr  PAUC*: 78 %  Ctrough,ss: 13.1 mg/L  Pconc*: 10 %  Tox.: 8 %    Past Medical / Surgical / Social History reviewed.     Objective:  Ht: 170.2 cm (67.01\"); Wt: 94.8 kg (209 lb) Body mass index is 32.73 kg/m².  Estimated Creatinine Clearance: 80.7 mL/min (by C-G formula based on SCr of 0.59 mg/dL).    BUN   Date Value Ref Range Status   02/10/2021 17 8 - 23 mg/dL Final   02/09/2021 16 8 - 23 mg/dL Final   02/08/2021 17 8 - 23 mg/dL Final   09/03/2019 16 8 - 23 mg/dL Final   01/14/2019 13 8 - 23 mg/dL Final   01/07/2019 12 8 - 23 mg/dL Final      Creatinine   Date Value Ref Range Status   02/10/2021 0.59 0.57 - 1.00 mg/dL Final   02/09/2021 0.66 0.57 - 1.00 mg/dL Final   02/08/2021 0.81 0.57 - 1.00 mg/dL Final   07/08/2020 0.90 0.60 - 1.30 mg/dL Final     Comment:     Serial Number: [id number]:  [id number]   09/03/2019 0.7 0.5 - 0.9 mg/dL Final   01/14/2019 0.5 0.5 - 0.9 mg/dL Final   01/07/2019 0.6 0.5 - 0.9 mg/dL Final      Lab Results   Component Value Date    WBC 7.46 02/10/2021    WBC 10.50 02/09/2021    WBC 11.33 (H) 02/08/2021        Lab Results   Component Value Date    [facility]         Culture Results:  Microbiology Results (last 10 " days)       Procedure Component Value - Date/Time    Blood Culture With BREONNA - Blood, Arm, Right [[id number]] Collected: 02/10/21 0445    Lab Status: Preliminary result Specimen: Blood from Arm, Right Updated: 02/10/21 1901     Blood Culture No growth at less than 24 hours    Blood Culture With BREONNA - Blood, Arm, Left [[id number]] Collected: 02/10/21 0445    Lab Status: Preliminary result Specimen: Blood from Arm, Left Updated: 02/10/21 1901     Blood Culture No growth at less than 24 hours    Tissue / Bone Culture - Tissue, Hip, Right [[id number]] Collected: 02/09/21 1745    Lab Status: Preliminary result Specimen: Tissue from Hip, Right Updated: 02/10/21 1308     Tissue Culture Growth present, too young to evaluate     Gram Stain Rare (1+) WBCs seen      Rare (1+) Gram positive cocci    Blood Culture - Blood, Blood, Venous Line [[id number]]  (Abnormal) Collected: 02/09/21 1736    Lab Status: Preliminary result Specimen: Blood, Venous Line Updated: 02/10/21 1041     Blood Culture Abnormal Stain     Gram Stain Aerobic Bottle Gram positive cocci in clusters    Body Fluid Culture - Synovial Fluid, Hip, Right [[id number]] Collected: 02/09/21 0740    Lab Status: Preliminary result Specimen: Synovial Fluid from Hip, Right Updated: 02/10/21 1308     Body Fluid Culture Growth present, too young to evaluate     Gram Stain Many (4+) WBCs seen      Rare (1+) Gram positive cocci    Narrative:      4    Wound Culture - Swab, Hip, Right [[id number]]  (Abnormal)  (Susceptibility) Collected: 02/08/21 2018    Lab Status: Preliminary result Specimen: Swab from Hip, Right Updated: 02/10/21 0857     Wound Culture Moderate growth (3+) Escherichia coli      Light growth (2+) Staphylococcus aureus     Gram Stain Rare (1+) WBCs seen      Rare (1+) Gram negative bacilli    Susceptibility        Escherichia coli     LICHA     Ampicillin Resistant     Ampicillin + Sulbactam Resistant     Cefepime Susceptible     Ceftazidime Susceptible     Ceftriaxone  Susceptible     Gentamicin Susceptible     Levofloxacin Susceptible     Piperacillin + Tazobactam Susceptible     Tetracycline Susceptible     Trimethoprim + Sulfamethoxazole Susceptible                  Susceptibility Comments       Escherichia coli    Cefazolin sensitivity will not be reported for Enterobacteriaceae in non-urine isolates. If cefazolin is preferred, please call the microbiology lab to request an E-test.  With the exception of urinary-sourced infections, aminoglycosides should not be used as monotherapy.               Blood Culture - Blood, Wrist, Left [[id number]]  (Abnormal) Collected: 02/08/21 1946    Lab Status: Preliminary result Specimen: Blood from Wrist, Left Updated: 02/10/21 1214     Blood Culture Staphylococcus aureus     Comment: Infectious disease consultation is highly recommended to rule out distant foci of infection.        Isolated from Aerobic and Anaerobic Bottles     Gram Stain Aerobic Bottle Gram positive cocci      Anaerobic Bottle Gram positive cocci    COVID-19, [facility] IN-HOUSE,NASAL Swab (NO TRANSPORT MEDIA) 2 HR TAT - Swab, Nasopharynx [[id number]]  (Normal) Collected: 02/08/21 1945    Lab Status: Final result Specimen: Swab from Nasopharynx Updated: 02/08/21 2038     COVID19 Presumptive Negative    Narrative:      Fact sheet for providers: https://www.fda.gov/media/656808/download     Fact sheet for patients: https://www.fda.gov/media/350454/download    Test performed by PCR.  If inconsistent with clinical signs and symptoms patient should be tested with different authorized molecular test.    Blood Culture - Blood, Arm, Right [[id number]]  (Abnormal) Collected: 02/08/21 1936    Lab Status: Preliminary result Specimen: Blood from Arm, Right Updated: 02/10/21 0727     Blood Culture Staphylococcus aureus     Comment: Infectious disease consultation is highly recommended to rule out distant foci of infection.        Isolated from Aerobic and Anaerobic Bottles     Gram Stain  Aerobic Bottle Gram positive cocci in clusters      Anaerobic Bottle Gram positive cocci in clusters    Blood Culture ID, PCR - Blood, Arm, Right [[id number]]  (Abnormal) Collected: 02/08/21 1936    Lab Status: Final result Specimen: Blood from Arm, Right Updated: 02/09/21 1643     BCID, PCR Staphylococcus aureus. Identification by BCID PCR.            Viral Cabezas, PharmD  02/10/21 19:44 CST

## 2021-02-11 NOTE — PROGRESS NOTES
Continued Stay Note   [facility]     Patient Name: Tawny Shin  MRN: [id number]  Today's Date: 2/11/2021    Admit Date: 2/8/2021    Discharge Plan     Row Name 02/11/21 1254       Plan    Final Discharge Disposition Code  63 - [facility]    Final Note  Pt is being dcd to [facility] today    Row Name 02/11/21 0956       Plan    Plan Comments  Order for LTAC received. Chelsea with [facility] aware of new referral and states pt is appropriate for LTAC. Due to staffing issues they cannot accept pt today. Chelsea states she will update on bed status tomorrow.        Discharge Codes    No documentation.             OSIEL Pizano

## 2021-02-11 NOTE — PLAN OF CARE
Goal Outcome Evaluation:       Patient A+Ox4. RAMIREZ. Right hip wound vac in place without leaks. IV antibiotics given as ordered. Patient needing better coverage for wound vac dressing changes as this proved to be very painful- MD gave new orders. Up x1 and walker (50% weight bearing to RLE) into chair and in BR. Telem on. VSS, will continue to monitor and notify MD of any changes

## 2021-02-11 NOTE — PLAN OF CARE
Goal Outcome Evaluation:  Plan of Care Reviewed With: patient  Progress: improving  Outcome Summary: pt trans to EOB cga, sit-stand cga, pt amb 25 feet x 2 with rwx TTWB RLE, bathroom trans, wound vac dressing intact,

## 2021-02-11 NOTE — PROGRESS NOTES
[facility] Medicine Services  INPATIENT PROGRESS NOTE    Patient Name: Tawny Shin  Date of Admission: 2/8/2021  Today's Date: 02/11/21  Length of Stay: 2  Primary Care Physician: Davon Urrutia MD    Subjective   Chief Complaint: right hip wound  HPI     Patient seen and examined at bedside.    Pain with vacuum suction on wound but otherwise stable and improved.  Denies any issues.  She is curious if she can get her second COVID-19 vaccine which is due 2/13, discussed with Dr. Copeladn we will hold for now.        Review of Systems   Constitutional: Positive for activity change. Negative for appetite change.   Respiratory: Negative for shortness of breath.    Cardiovascular: Negative for palpitations.   Gastrointestinal: Negative for abdominal distention, constipation and diarrhea.        All pertinent negatives and positives are as above. All other systems have been reviewed and are negative unless otherwise stated.     Objective    Temp:  [98.1 °F (36.7 °C)-98.9 °F (37.2 °C)] 98.2 °F (36.8 °C)  Heart Rate:  [67-80] 67  Resp:  [16-18] 18  BP: (129-153)/(52-60) 129/52  Physical Exam  Vitals signs reviewed.   Constitutional:       Appearance: Normal appearance.   HENT:      Head: Normocephalic and atraumatic.      Mouth/Throat:      Mouth: Mucous membranes are moist.      Pharynx: Oropharynx is clear.   Eyes:      General: No scleral icterus.     Conjunctiva/sclera: Conjunctivae normal.   Cardiovascular:      Rate and Rhythm: Normal rate and regular rhythm.   Abdominal:      General: Abdomen is flat. Bowel sounds are normal. There is no distension.      Palpations: Abdomen is soft. There is no mass.      Tenderness: There is no abdominal tenderness.      Hernia: No hernia is present.   Skin:     General: Skin is warm and dry.      Coloration: Skin is pale.      Comments: Right sided wound vac adequate seal   Neurological:      General: No focal deficit present.      Mental  Status: She is alert and oriented to person, place, and time.   Psychiatric:         Mood and Affect: Mood normal.         Behavior: Behavior normal.             Results Review:  I have reviewed the labs, radiology results, and diagnostic studies.    Laboratory Data:   Results from last 7 days   Lab Units 02/11/21  0613 02/10/21  0445 02/09/21  0426   WBC 10*3/mm3 4.85 7.46 10.50   HEMOGLOBIN g/dL 8.4* 9.1* 10.6*   HEMATOCRIT % 26.0* 29.0* 33.5*   PLATELETS 10*3/mm3 143 136* 166        Results from last 7 days   Lab Units 02/11/21  0613 02/10/21  0445 02/09/21  0426 02/08/21  1936   SODIUM mmol/L 136 135* 137 136   POTASSIUM mmol/L 4.2 3.8 3.6 4.0   CHLORIDE mmol/L 105 103 105 98   CO2 mmol/L 27.0 24.0 24.0 28.0   BUN mg/dL 13 17 16 17   CREATININE mg/dL 0.52* 0.59 0.66 0.81   CALCIUM mg/dL 9.0 8.7 8.7 9.3   BILIRUBIN mg/dL  --  0.7  --  0.7   ALK PHOS U/L  --  69  --  91   ALT (SGPT) U/L  --  17  --  13   AST (SGOT) U/L  --  77*  --  25   GLUCOSE mg/dL 93 83 109* 114*       Culture Data:   Blood Culture   Date Value Ref Range Status   02/08/2021 Abnormal Stain (C)  Preliminary   02/08/2021 Abnormal Stain (C)  Preliminary     Wound Culture   Date Value Ref Range Status   02/08/2021 Moderate growth (3+) Gram Negative Bacilli (A)  Preliminary       Radiology Data:   Imaging Results (Last 24 Hours)     ** No results found for the last 24 hours. **          I have reviewed the patient's current medications.     Assessment/Plan     Active Hospital Problems    Diagnosis   • **Staphylococcus aureus bacteremia   • Right hip pain, suspected infection   • Obesity (BMI 30-39.9)   • Syncope, recurrent   • Leukocytosis   • Headache   • Elevated CPK   • Seropositive rheumatoid arthritis of multiple sites (CMS/HCC)     Overview:   -myochrysine '93-'96  -methotrexate '96--->11/98;r/s  restarted 2/99--> 8/14 (anemia)  -sulfasalazine- not effective  -penicillamine 6/98-->10/98; no effect  -leflunomide 11/98-->  - Humira '13-->didn't  "take  - Enbrel 12/14-->3/15- no effect!      Last Assessment & Plan:   - \"aching all over\" because she had to be off her anti-rheumatic drugs for 2 weeks in preparation for her R knee surgery  - her R knee surgery got cancelled because she developed cellulitis of her L arm (with fever). She was started on Bactrim, and then transitioned to doxycyline on Aug 1 2018 (3 week course) and her R knee surgery was postponed to 1 month from now.   - As such, she restarted her anti-rheumatics on Aug 1 2018.  - She will have to hold her anti-rheumatics again 2 weeks before her surgery  - She feels her medications are otherwise working well. No changes to be made to her current regimen, which is Humira injection q2 week, leflunomide (Arava), and salicylate     - Pt is concerned that when she transitions insurance in December of this year, she will lose her salicylate. We had a discussion and expressed that it would be best for her to stay on salicylate because we do not want the blood thinning effects of aspirin. Dr. Walden will plan to write a prior authorization for her for salicylate.     • Coronary artery disease     PCI RCA '10, NSTEMI requiring LAD PCI in '13    [facility] 9/3/19 at [facility]: no significant dz; stents patent       Plan:  1.  Patient admitted on 2/8 by Dr. Paul with syncope and collapse. Patient was found to have suspect right hip infection from recent gluteal repair.  2.  Orthopedics consult, appreciate assistance.  Dr. Carlson took patient to OR on 2/9 for debridement and incision/drainage.  Patient has a 20cm x 6 cm wound, with wound vac.    3.  Blood cultures from 2/8 came back positive for staph aureus in 4/4 bottles.  BC remain positive on 2/9.  Wound cultures on 2/8 show e coli and staph aureus. Awaiting sensitivities on the staph  4.  Infectious disease consulted, discussed case with Dr. Copeland.  Continue vancomycin and aztreonam for now.  5.  Continue home medications as appropriate  6.  Pacemaker " interrogated, no abnormalities per nursing, awaiting full report  7.  MRI brain without contrast (2/9) shows no acute process  8.  Per family, patient has had recurrent syncope, this is not a new finding.  9.  Wound care per orthopedics   10.  PT/OT  11.  Morphine 4 mg q4h PRN, norco 7.5 mg q4h PRN, dilaudid 1 mg IV with dressing changes   12.  SW consult for LTACH placement - Will go on 2/12  13.  ESTEFANI in AM with Dr. Davey.               Discharge Planning: I expect the patient to be discharged to LTACH.    Electronically signed by Moises Oliveira MD, 02/11/21, 14:03 CST.

## 2021-02-12 ENCOUNTER — APPOINTMENT (OUTPATIENT)
Dept: CARDIOLOGY | Facility: HOSPITAL | Age: 66
End: 2021-02-12

## 2021-02-12 ENCOUNTER — HOSPITAL ENCOUNTER (INPATIENT)
Facility: HOSPITAL | Age: 66
LOS: 3 days | Discharge: NURSING, REHAB OR LONG-TERM CARE | End: 2021-03-06
Attending: INTERNAL MEDICINE | Admitting: NEUROLOGICAL SURGERY

## 2021-02-12 VITALS
TEMPERATURE: 97.8 F | RESPIRATION RATE: 20 BRPM | HEART RATE: 68 BPM | DIASTOLIC BLOOD PRESSURE: 68 MMHG | SYSTOLIC BLOOD PRESSURE: 126 MMHG | WEIGHT: 209 LBS | HEIGHT: 67 IN | BODY MASS INDEX: 32.8 KG/M2 | OXYGEN SATURATION: 94 %

## 2021-02-12 DIAGNOSIS — G99.2 STENOSIS OF CERVICAL SPINE WITH MYELOPATHY (HCC): Primary | ICD-10-CM

## 2021-02-12 DIAGNOSIS — Z74.09 IMPAIRED MOBILITY AND ADLS: ICD-10-CM

## 2021-02-12 DIAGNOSIS — S71.001D OPEN WOUND OF RIGHT HIP, SUBSEQUENT ENCOUNTER: ICD-10-CM

## 2021-02-12 DIAGNOSIS — M48.062 SPINAL STENOSIS, LUMBAR REGION, WITH NEUROGENIC CLAUDICATION: ICD-10-CM

## 2021-02-12 DIAGNOSIS — R13.10 DYSPHAGIA, UNSPECIFIED TYPE: ICD-10-CM

## 2021-02-12 DIAGNOSIS — M48.02 STENOSIS OF CERVICAL SPINE WITH MYELOPATHY (HCC): Primary | ICD-10-CM

## 2021-02-12 DIAGNOSIS — Z78.9 IMPAIRED MOBILITY AND ADLS: ICD-10-CM

## 2021-02-12 DIAGNOSIS — B95.61 STAPHYLOCOCCUS AUREUS BACTEREMIA: ICD-10-CM

## 2021-02-12 DIAGNOSIS — M46.26 OSTEOMYELITIS OF LUMBAR SPINE (HCC): ICD-10-CM

## 2021-02-12 DIAGNOSIS — M46.46 DISCITIS OF LUMBAR REGION: ICD-10-CM

## 2021-02-12 DIAGNOSIS — R78.81 STAPHYLOCOCCUS AUREUS BACTEREMIA: ICD-10-CM

## 2021-02-12 DIAGNOSIS — R26.9 GAIT ABNORMALITY: ICD-10-CM

## 2021-02-12 LAB
BACTERIA SPEC AEROBE CULT: ABNORMAL
GRAM STN SPEC: ABNORMAL
HOLD SPECIMEN: NORMAL
ISOLATED FROM: ABNORMAL
ISOLATED FROM: ABNORMAL
SARS-COV-2 RNA PNL SPEC NAA+PROBE: NOT DETECTED
WHOLE BLOOD HOLD SPECIMEN: NORMAL

## 2021-02-12 PROCEDURE — 99152 MOD SED SAME PHYS/QHP 5/>YRS: CPT

## 2021-02-12 PROCEDURE — 25010000002 CEFAZOLIN PER 500 MG: Performed by: INTERNAL MEDICINE

## 2021-02-12 PROCEDURE — 93312 ECHO TRANSESOPHAGEAL: CPT

## 2021-02-12 PROCEDURE — 93325 DOPPLER ECHO COLOR FLOW MAPG: CPT

## 2021-02-12 PROCEDURE — 25010000003 HYDROMORPHONE 1 MG/ML SOLUTION: Performed by: INTERNAL MEDICINE

## 2021-02-12 PROCEDURE — 93321 DOPPLER ECHO F-UP/LMTD STD: CPT | Performed by: INTERNAL MEDICINE

## 2021-02-12 PROCEDURE — 94799 UNLISTED PULMONARY SVC/PX: CPT

## 2021-02-12 PROCEDURE — 93312 ECHO TRANSESOPHAGEAL: CPT | Performed by: INTERNAL MEDICINE

## 2021-02-12 PROCEDURE — 93325 DOPPLER ECHO COLOR FLOW MAPG: CPT | Performed by: INTERNAL MEDICINE

## 2021-02-12 PROCEDURE — 93321 DOPPLER ECHO F-UP/LMTD STD: CPT

## 2021-02-12 PROCEDURE — 63710000001 PREDNISONE PER 5 MG: Performed by: INTERNAL MEDICINE

## 2021-02-12 PROCEDURE — 87040 BLOOD CULTURE FOR BACTERIA: CPT | Performed by: INTERNAL MEDICINE

## 2021-02-12 PROCEDURE — 25010000002 FENTANYL CITRATE (PF) 100 MCG/2ML SOLUTION: Performed by: INTERNAL MEDICINE

## 2021-02-12 PROCEDURE — 25010000002 MIDAZOLAM PER 1 MG: Performed by: INTERNAL MEDICINE

## 2021-02-12 PROCEDURE — 97606 NEG PRS WND THER DME>50 SQCM: CPT | Performed by: PHYSICAL THERAPIST

## 2021-02-12 PROCEDURE — 87635 SARS-COV-2 COVID-19 AMP PRB: CPT | Performed by: INTERNAL MEDICINE

## 2021-02-12 PROCEDURE — 25010000002 MORPHINE PER 10 MG: Performed by: INTERNAL MEDICINE

## 2021-02-12 RX ORDER — ACETAMINOPHEN 325 MG/1
650 TABLET ORAL EVERY 4 HOURS PRN
Status: DISCONTINUED | OUTPATIENT
Start: 2021-02-12 | End: 2021-03-03 | Stop reason: SDUPTHER

## 2021-02-12 RX ORDER — PREDNISONE 1 MG/1
2 TABLET ORAL DAILY
Status: ON HOLD
Start: 2021-02-13 | End: 2021-03-23

## 2021-02-12 RX ORDER — TRAMADOL HYDROCHLORIDE 50 MG/1
50 TABLET ORAL EVERY 6 HOURS PRN
Status: DISCONTINUED | OUTPATIENT
Start: 2021-02-12 | End: 2021-02-19

## 2021-02-12 RX ORDER — BUDESONIDE 0.5 MG/2ML
0.5 INHALANT ORAL
Status: DISCONTINUED | OUTPATIENT
Start: 2021-02-12 | End: 2021-02-26

## 2021-02-12 RX ORDER — PREDNISONE 1 MG/1
5 TABLET ORAL
Status: DISCONTINUED | OUTPATIENT
Start: 2021-02-13 | End: 2021-03-06 | Stop reason: HOSPADM

## 2021-02-12 RX ORDER — FOLIC ACID 1 MG/1
1 TABLET ORAL DAILY
Status: DISCONTINUED | OUTPATIENT
Start: 2021-02-13 | End: 2021-03-06 | Stop reason: HOSPADM

## 2021-02-12 RX ORDER — SACCHAROMYCES BOULARDII 250 MG
250 CAPSULE ORAL 2 TIMES DAILY
Status: DISCONTINUED | OUTPATIENT
Start: 2021-02-12 | End: 2021-03-06 | Stop reason: HOSPADM

## 2021-02-12 RX ORDER — CARVEDILOL 3.12 MG/1
12.5 TABLET ORAL 2 TIMES DAILY WITH MEALS
Status: DISCONTINUED | OUTPATIENT
Start: 2021-02-12 | End: 2021-03-01

## 2021-02-12 RX ORDER — PANTOPRAZOLE SODIUM 40 MG/1
40 TABLET, DELAYED RELEASE ORAL
Status: DISCONTINUED | OUTPATIENT
Start: 2021-02-13 | End: 2021-03-06 | Stop reason: HOSPADM

## 2021-02-12 RX ORDER — ISOSORBIDE MONONITRATE 60 MG/1
60 TABLET, EXTENDED RELEASE ORAL
Status: DISCONTINUED | OUTPATIENT
Start: 2021-02-12 | End: 2021-03-06 | Stop reason: HOSPADM

## 2021-02-12 RX ORDER — LEVOTHYROXINE SODIUM 0.07 MG/1
75 TABLET ORAL
Status: DISCONTINUED | OUTPATIENT
Start: 2021-02-13 | End: 2021-03-06 | Stop reason: HOSPADM

## 2021-02-12 RX ORDER — HYDROCODONE BITARTRATE AND ACETAMINOPHEN 7.5; 325 MG/1; MG/1
1 TABLET ORAL EVERY 4 HOURS PRN
Status: DISCONTINUED | OUTPATIENT
Start: 2021-02-12 | End: 2021-02-19 | Stop reason: SDUPTHER

## 2021-02-12 RX ORDER — CLOPIDOGREL BISULFATE 75 MG/1
75 TABLET ORAL DAILY
Status: DISCONTINUED | OUTPATIENT
Start: 2021-02-13 | End: 2021-02-26

## 2021-02-12 RX ORDER — NITROGLYCERIN 0.4 MG/1
0.4 TABLET SUBLINGUAL
Status: DISCONTINUED | OUTPATIENT
Start: 2021-02-12 | End: 2021-03-06 | Stop reason: HOSPADM

## 2021-02-12 RX ORDER — BUPIVACAINE HCL/0.9 % NACL/PF 0.1 %
2 PLASTIC BAG, INJECTION (ML) EPIDURAL EVERY 8 HOURS
Qty: 8100 ML | Refills: 0
Start: 2021-02-12 | End: 2021-03-11

## 2021-02-12 RX ORDER — BUPIVACAINE HCL/0.9 % NACL/PF 0.1 %
2 PLASTIC BAG, INJECTION (ML) EPIDURAL EVERY 8 HOURS
Status: DISCONTINUED | OUTPATIENT
Start: 2021-02-12 | End: 2021-03-06 | Stop reason: HOSPADM

## 2021-02-12 RX ORDER — GUAIFENESIN 600 MG/1
600 TABLET, EXTENDED RELEASE ORAL EVERY 12 HOURS SCHEDULED
Status: DISCONTINUED | OUTPATIENT
Start: 2021-02-12 | End: 2021-03-06 | Stop reason: HOSPADM

## 2021-02-12 RX ORDER — ASPIRIN 81 MG/1
81 TABLET ORAL DAILY
Status: DISCONTINUED | OUTPATIENT
Start: 2021-02-13 | End: 2021-02-26

## 2021-02-12 RX ORDER — MIDAZOLAM HYDROCHLORIDE 1 MG/ML
INJECTION INTRAMUSCULAR; INTRAVENOUS
Status: COMPLETED | OUTPATIENT
Start: 2021-02-12 | End: 2021-02-12

## 2021-02-12 RX ORDER — FENTANYL CITRATE 50 UG/ML
INJECTION, SOLUTION INTRAMUSCULAR; INTRAVENOUS
Status: COMPLETED | OUTPATIENT
Start: 2021-02-12 | End: 2021-02-12

## 2021-02-12 RX ORDER — VALACYCLOVIR HYDROCHLORIDE 500 MG/1
500 TABLET, FILM COATED ORAL
Status: DISCONTINUED | OUTPATIENT
Start: 2021-02-13 | End: 2021-02-17

## 2021-02-12 RX ADMIN — LEVOTHYROXINE SODIUM 75 MCG: 75 TABLET ORAL at 13:17

## 2021-02-12 RX ADMIN — HYDROMORPHONE HYDROCHLORIDE 1 MG: 1 INJECTION, SOLUTION INTRAMUSCULAR; INTRAVENOUS; SUBCUTANEOUS at 14:07

## 2021-02-12 RX ADMIN — FENTANYL CITRATE 50 MCG: 50 INJECTION, SOLUTION INTRAMUSCULAR; INTRAVENOUS at 10:53

## 2021-02-12 RX ADMIN — CARVEDILOL 12.5 MG: 6.25 TABLET, FILM COATED ORAL at 13:16

## 2021-02-12 RX ADMIN — FOLIC ACID 1000 MCG: 1 TABLET ORAL at 13:17

## 2021-02-12 RX ADMIN — ISOSORBIDE MONONITRATE 60 MG: 60 TABLET, EXTENDED RELEASE ORAL at 13:15

## 2021-02-12 RX ADMIN — PANTOPRAZOLE SODIUM 40 MG: 40 TABLET, DELAYED RELEASE ORAL at 13:23

## 2021-02-12 RX ADMIN — HYDROCODONE BITARTRATE AND ACETAMINOPHEN 1 TABLET: 7.5; 325 TABLET ORAL at 03:02

## 2021-02-12 RX ADMIN — PREDNISONE 2 MG: 1 TABLET ORAL at 13:16

## 2021-02-12 RX ADMIN — VALACYCLOVIR 500 MG: 500 TABLET, FILM COATED ORAL at 13:17

## 2021-02-12 RX ADMIN — MORPHINE SULFATE 4 MG: 4 INJECTION, SOLUTION INTRAMUSCULAR; INTRAVENOUS at 06:16

## 2021-02-12 RX ADMIN — CEFAZOLIN SODIUM 2 G: 10 INJECTION, POWDER, FOR SOLUTION INTRAVENOUS at 08:10

## 2021-02-12 RX ADMIN — MIDAZOLAM 2 MG: 1 INJECTION INTRAMUSCULAR; INTRAVENOUS at 10:53

## 2021-02-12 RX ADMIN — SODIUM CHLORIDE, PRESERVATIVE FREE 10 ML: 5 INJECTION INTRAVENOUS at 08:10

## 2021-02-12 RX ADMIN — BUDESONIDE 0.5 MG: 0.5 INHALANT RESPIRATORY (INHALATION) at 07:47

## 2021-02-12 RX ADMIN — HYDROCODONE BITARTRATE AND ACETAMINOPHEN 1 TABLET: 7.5; 325 TABLET ORAL at 13:15

## 2021-02-12 RX ADMIN — MIDAZOLAM 1 MG: 1 INJECTION INTRAMUSCULAR; INTRAVENOUS at 10:55

## 2021-02-12 RX ADMIN — ASPIRIN 81 MG: 81 TABLET, FILM COATED ORAL at 13:16

## 2021-02-12 RX ADMIN — GUAIFENESIN 600 MG: 600 TABLET, EXTENDED RELEASE ORAL at 13:17

## 2021-02-12 RX ADMIN — CLOPIDOGREL 75 MG: 75 TABLET, FILM COATED ORAL at 13:16

## 2021-02-12 NOTE — THERAPY WOUND CARE TREATMENT
Acute Care - Wound/Debridement Treatment Note  [facility]     Patient Name: Tawny Shin  : 1955  MRN: [id number]  Today's Date: 2021  Onset of Illness/Injury or Date of Surgery: 21      Referring Physician: Aldo      Admit Date: 2021    Visit Dx:    ICD-10-CM ICD-9-CM   1. Altered mental status, unspecified altered mental status type  R41.82 780.97   2. Syncope, unspecified syncope type  R55 780.2   3. Impaired mobility  Z74.09 799.89   4. Abscess  L02.91 682.9   5. Right hip pain  M25.551 719.45   6. Right hip pain, suspected infection  M25.551 719.45       Patient Active Problem List   Diagnosis   • Eustachian tube dysfunction   • Sensorineural hearing loss   • Lumbago of multiple sites in spine with sciatica   • Spondylolisthesis of lumbar region   • Coronary artery disease   • Hyperlipidemia   • Hypertension   • Myalgia due to statin   • S/P coronary artery stent placement   • Pacemaker   • Seropositive rheumatoid arthritis of multiple sites (CMS/HCC)   • Sick sinus syndrome (CMS/HCC)   • Other osteoporosis without current pathological fracture   • Allergic-infective asthma   • Arthritis of both knees   • Vasovagal syncope   • Bilateral bunions   • Bronchitis   • Cardiac pacemaker syndrome   • Charcot's joint of foot, left   • Constipation   • 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   • Intrinsic asthma   • Left carotid bruit   • Neutropenia (CMS/HCC)   • Obesity   • Primary osteoarthritis of left knee   • Psoriasis vulgaris   • Recurrent acute serous otitis media of both ears   • S/P lumbar laminectomy   • Thrombocytopenia (CMS/HCC)   • Hypothyroidism   • Vitamin D deficiency   • Myxedema heart disease   • Spondylolisthesis of lumbar region   • Syncope, cardiogenic   • Rupture of gluteus minimus tendon   • Muscle tear   • Syncope, recurrent   • Leukocytosis   • Headache   • Elevated CPK   • Staphylococcus aureus bacteremia   • Right hip pain, suspected infection   • Obesity (BMI 30-39.9)         Past Medical History:   Diagnosis Date   • Arthritis    • Asthma    • Chronic sinusitis    • Coronary artery disease    • Eustachian tube dysfunction    • Heart disease    • Herpes simplex    • Hyperlipidemia    • Hypertension    • Knee dislocation    • Labral tear of right hip joint    • Laryngitis sicca    • Laryngitis, chronic    • MI (myocardial infarction) (CMS/HCC)    • Otorrhea    • Sensorineural hearing loss    • Sjogren's disease (CMS/HCC)         Past Surgical History:   Procedure Laterality Date   • A-V CARDIAC PACEMAKER INSERTION  2016   • ATRIAL CARDIAC PACEMAKER INSERTION     • CARDIAC CATHETERIZATION     • CATARACT EXTRACTION     • COLONOSCOPY  11/08/2011    One fold in the ascending colon which showed ulcer otherwise normal exam   • COLONOSCOPY  11/12/2004    Normal exam repeat in five years   • CORONARY ANGIOPLASTY WITH STENT PLACEMENT      X 2; 2013 & 2014   • ENDOSCOPY  07/10/2014    Normal exam   • HIP ABDUCTION TENOTOMY BILATERAL Right 1/14/2021    Procedure: RIGHT HIP GLUTEUS MEDLUS / MINIMUS REPAIR, POSSIBLE ACHILLES ALLOGRAFT;  Surgeon: Nino Carlson MD;  Location: [facility] OR;  Service: Orthopedics;  Laterality: Right;   • INCISION AND DRAINAGE HIP Right 2/9/2021    Procedure: HIP INCISION AND DRAINAGE;  Surgeon: Nino Carlson MD;  Location: [facility] OR;  Service: Orthopedics;  Laterality: Right;   • JOINT REPLACEMENT     • LUMBAR FUSION N/A 1/19/2018    Procedure: L3-4,L4-5 DECOMPRESSION, POSTERIOR SPINAL FUSION WITH INSTRUMENTATION;  Surgeon: Fortino Oropeza MD;  Location: [facility] OR;  Service:    • LUMBAR LAMINECTOMY WITH FUSION Left 1/17/2018    Procedure: LEFT L3-4 L4-5 LATERAL LUMBAR INTERBODY FUSION;  Surgeon: Fortino Oropeza MD;  Location: [facility] OR;  Service:    • MYRINGOTOMY W/ TUBES  09/04/2014    TUBES NO LONGER IN PLACE   • OTHER SURGICAL HISTORY      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 so hardware was removed and spacers were placed   • REPLACEMENT TOTAL KNEE Right             Wound 01/14/21 0950 Right anterior hip Incision (Active)   Dressing Appearance copious drainage 02/12/21 0805   Base pink;red 02/12/21 0805   Periwound blanchable;moist 02/12/21 0805   Periwound Temperature warm 02/12/21 0805   Periwound Skin Turgor firm 02/12/21 0805   Drainage Characteristics/Odor malodorous 02/12/21 0805   Drainage Amount moderate 02/12/21 0805   Dressing Care foam 02/12/21 0805       Wound 02/09/21 1715 Right hip Incision (Active)   Wound Image   02/12/21 1500   Dressing Appearance intact 02/12/21 1500   Closure Other (Comment) 02/12/21 1500   Base blanchable;clean;red;pink;subcutaneous 02/12/21 1500   Red (%), Wound Tissue Color 90 02/12/21 1500   Yellow (%), Wound Tissue Color 10 02/12/21 1500   Periwound intact;blanchable 02/12/21 1500   Periwound Temperature warm 02/12/21 1500   Edges open 02/12/21 1500   Drainage Characteristics/Odor sanguineous 02/12/21 1500   Drainage Amount small 02/12/21 1500   Care, Wound negative pressure wound therapy 02/12/21 1500   Dressing Care dressing changed 02/12/21 1500   Periwound Care barrier ointment applied 02/12/21 1500   Wound Output (mL) 100 02/12/21 1500       NPWT (Negative Pressure Wound Therapy) 02/09/21 1741 right hip (Active)   Therapy Setting continuous therapy 02/12/21 1500   Dressing foam, black 02/12/21 1500   Contact Layer Vaseline-embedded gauze 02/12/21 1500   Pressure Setting 125 mmHg 02/12/21 1500   Sponges Inserted 3 02/12/21 1500   Sponges Removed 1 02/12/21 1500   Finger sweep complete Yes 02/12/21 1500         WOUND DEBRIDEMENT                        PT Assessment (last 12 hours)      PT Evaluation and Treatment     Row Name 02/12/21 1500          Physical Therapy Time and Intention    Subjective Information  complains of;pain  -MS     Document Type  therapy note (daily note);wound care  -MS     Mode of Treatment  physical therapy  -MS     Row Name 02/12/21 1500          Cognition    Orientation Status (Cognition)  oriented  x 4  -MS     Row Name 02/12/21 1500          Pain Scale: Numbers Pre/Post-Treatment    Pretreatment Pain Rating  2/10  -MS     Posttreatment Pain Rating  2/10  -MS     Pain Location - Side  Right  -MS     Pain Location - Orientation  incisional  -MS     Pain Location  hip  -MS     Row Name 02/12/21 1500          Pain Scale: Word Pre/Post-Treatment    Pain Intervention(s)  Medication (See MAR);Ambulation/increased activity;Repositioned  -MS     Row Name             Wound 01/14/21 0950 Right anterior hip Incision    Wound - Properties Group Placement Date: 01/14/21  -AS Placement Time: 0950  -AS Side: Right  -AS Orientation: anterior  -AS Location: hip  -AS Primary Wound Type: Incision  -AS    Retired Wound - Properties Group Date first assessed: 01/14/21  -AS Time first assessed: 0950  -AS Side: Right  -AS Location: hip  -AS Primary Wound Type: Incision  -AS    Row Name 02/12/21 1500          Wound 02/09/21 1715 Right hip Incision    Wound - Properties Group Placement Date: 02/09/21  -SH Placement Time: 1715  -SH Present on Hospital Admission: Y  -SH Side: Right  -SH Location: hip  -SH Primary Wound Type: Incision  -SH    Wound Image  Images linked: 1  -MS     Dressing Appearance  intact  -MS     Closure  Other (Comment) NPWT dressing  -MS     Base  blanchable;clean;red;pink;subcutaneous  -MS     Red (%), Wound Tissue Color  90  -MS     Yellow (%), Wound Tissue Color  10 subcutaneous tissue  -MS     Periwound  intact;blanchable  -MS     Periwound Temperature  warm  -MS     Edges  open  -MS     Drainage Characteristics/Odor  sanguineous  -MS     Drainage Amount  small  -MS     Care, Wound  negative pressure wound therapy  -MS     Dressing Care  dressing changed  -MS     Periwound Care  barrier ointment applied  -MS     Wound Output (mL)  100 125mL total in canister  -MS     Retired Wound - Properties Group Date first assessed: 02/09/21  -SH Time first assessed: 1715  -SH Present on Hospital Admission: Y  -SH Side:  Right  -SH Location: hip  -SH Primary Wound Type: Incision  -SH    Row Name 02/12/21 1500          NPWT (Negative Pressure Wound Therapy) 02/09/21 1741 right hip    NPWT (Negative Pressure Wound Therapy) - Properties Group Placement Date: 02/09/21  - Placement Time: 1741 -SH Location: right hip  -SH Additional Comments: 1 pc of black sponge  -SH    Therapy Setting  continuous therapy  -MS     Dressing  foam, black  -MS     Contact Layer  Vaseline-embedded gauze 1 piece adaptic in wound over muscle  -MS     Pressure Setting  125 mmHg  -MS     Sponges Inserted  3  -MS     Sponges Removed  1  -MS     Finger sweep complete  Yes  -MS     Retired NPWT (Negative Pressure Wound Therapy) - Properties Group Placement Date: 02/09/21  -SH Placement Time: 1741 -SH Location: right hip  -SH Additional Comments: 1 pc of black sponge  -SH    Row Name 02/12/21 1500          Plan of Care Review    Plan of Care Reviewed With  patient  -MS     Progress  no change  -MS     Outcome Summary  NPWT dressing changed today. The previous dressing was intact with sangious drainage present in canister. The dressing was removed to find a healthy wound bed with no active bleeding. The muscle is exposed at the base of the wound so adaptic was used to protect the muscle from the foam dressing. A good seal was acheived with minimal pain to the patient due to premedication prior to dressing change. The wound is ideal for instillation wound vac to help increase healing rate and to help with any residual infection.   -MS     Row Name 02/12/21 1500          Positioning and Restraints    Post Treatment Position  bed  -MS     In Bed  fowlers;call light within reach;encouraged to call for assist;side rails up x2  -MS       User Key  (r) = Recorded By, (t) = Taken By, (c) = Cosigned By    Initials Name Provider Type    MS Nicole Olson R, PT, DPT, NCS Physical Therapist    AS Miguelito Quintana, RN, BSN Registered Nurse    Laurie Ritter, RN  Registered Nurse        Physical Therapy Education                 Title: PT OT SLP Therapies (In Progress)     Topic: Physical Therapy (Done)     Point: Mobility training (Done)     Learning Progress Summary           Patient Acceptance, E,TB,D, VU,DU,NR by  at 2/10/2021 1604    Comment: Education re: purpose of PT/use of NPWT for wound mgmt, risks/benefits/precautions; education for TTWB R LE w/ gait using rwx; education to reinforce proper tech w/ bed mob/tfers/gait w/ rwx    Acceptance, E, VU by AB at 2/9/2021 0948    Comment: PT role in care, plan of care, d/c plan, R hip precautions                   Point: Home exercise program (Done)     Learning Progress Summary           Patient Acceptance, E, VU by AB at 2/9/2021 0948    Comment: PT role in care, plan of care, d/c plan, R hip precautions                   Point: Body mechanics (Done)     Learning Progress Summary           Patient Acceptance, E, VU by AB at 2/9/2021 0948    Comment: PT role in care, plan of care, d/c plan, R hip precautions                   Point: Precautions (Done)     Learning Progress Summary           Patient Acceptance, E, VU by MS at 2/12/2021 1525    Comment: role of wound vac in her care    Acceptance, E,TB, VU by  at 2/11/2021 1003    Comment: TTWB RLE    Acceptance, E,TB,D, VU,DU,NR by  at 2/10/2021 1604    Comment: Education re: purpose of PT/use of NPWT for wound mgmt, risks/benefits/precautions; education for TTWB R LE w/ gait using rwx; education to reinforce proper tech w/ bed mob/tfers/gait w/ rwx    Acceptance, E, VU by AB at 2/9/2021 0948    Comment: PT role in care, plan of care, d/c plan, R hip precautions                               User Key     Initials Effective Dates Name Provider Type Discipline     08/02/16 -  Olga Chambers, PTA Physical Therapy Assistant PT    MS 06/19/18 -  Nicole Olson, PT, DPT, NCS Physical Therapist PT     08/02/18 -  Anupama De La Cruz, PT Physical Therapist PT    AB  12/02/20 -  Familia Ambriz, PT Student PT Student PT                Recommendation and Plan  Anticipated Equipment Needs at Discharge (PT): other (see comments)(home wound VAC)  Anticipated Discharge Disposition (PT): home with assist, home with home health  Planned Therapy Interventions (PT): balance training, bed mobility training, gait training, home exercise program, patient/family education, ROM (range of motion), stair training, strengthening, transfer training, wound care, other (see comments)(safety/falls prevention, edema/pain mgmt)  Therapy Frequency (PT): 2 times/day  Plan of Care Reviewed With: patient   Progress: no change       Progress: no change  Outcome Summary: NPWT dressing changed today. The previous dressing was intact with sangious drainage present in canister. The dressing was removed to find a healthy wound bed with no active bleeding. The muscle is exposed at the base of the wound so adaptic was used to protect the muscle from the foam dressing. A good seal was acheived with minimal pain to the patient due to premedication prior to dressing change. The wound is ideal for instillation wound vac to help increase healing rate and to help with any residual infection.   Plan of Care Reviewed With: patient            Time Calculation              PT G-Codes  Outcome Measure Options: AM-PAC 6 Clicks Basic Mobility (PT)  AM-PAC 6 Clicks Score (PT): 17  AM-PAC 6 Clicks Score (OT): 21       Nicole Olson, PT, DPT, NCS  2/12/2021

## 2021-02-12 NOTE — PLAN OF CARE
Goal Outcome Evaluation:  Plan of Care Reviewed With: patient  Progress: no change  Outcome Summary: NPWT dressing changed today. The previous dressing was intact with sangious drainage present in canister. The dressing was removed to find a healthy wound bed with no active bleeding. The muscle is exposed at the base of the wound so adaptic was used to protect the muscle from the foam dressing. A good seal was acheived with minimal pain to the patient due to premedication prior to dressing change. The wound is ideal for instillation wound vac to help increase healing rate and to help with any residual infection.

## 2021-02-12 NOTE — PLAN OF CARE
Goal Outcome Evaluation:  Plan of Care Reviewed With: patient, daughter  Progress: no change  Outcome Summary: Pt alert and oriented x4. Wound vac in place to R hip. Pt's pain has been more elevated than last night. Prn  ultram and norco given as ordered with some relief noted. Ancef given as ordered. Pt ambulating well upx1 with walker to bathroom. Has been NPO since midnight for ESTEFANI today. VSS. Daughter remaining at bedside. Will continue to monitor.

## 2021-02-12 NOTE — PLAN OF CARE
Goal Outcome Evaluation:  Plan of Care Reviewed With: patient, daughter  Progress: improving   Pt is A + O x4, rates pain at a 2, has sat in chair and ambulated with PT and OT this am.  Probable ESTEFANI in am

## 2021-02-13 LAB
ALBUMIN SERPL-MCNC: 2.7 G/DL (ref 3.5–5.2)
ALBUMIN/GLOB SERPL: 0.8 G/DL
ALP SERPL-CCNC: 77 U/L (ref 39–117)
ALT SERPL W P-5'-P-CCNC: 15 U/L (ref 1–33)
ANION GAP SERPL CALCULATED.3IONS-SCNC: 7 MMOL/L (ref 5–15)
AST SERPL-CCNC: 31 U/L (ref 1–32)
BASOPHILS # BLD AUTO: 0.04 10*3/MM3 (ref 0–0.2)
BASOPHILS NFR BLD AUTO: 0.9 % (ref 0–1.5)
BILIRUB SERPL-MCNC: 0.4 MG/DL (ref 0–1.2)
BUN SERPL-MCNC: 8 MG/DL (ref 8–23)
BUN/CREAT SERPL: 17 (ref 7–25)
CALCIUM SPEC-SCNC: 9.2 MG/DL (ref 8.6–10.5)
CHLORIDE SERPL-SCNC: 108 MMOL/L (ref 98–107)
CO2 SERPL-SCNC: 25 MMOL/L (ref 22–29)
CREAT SERPL-MCNC: 0.47 MG/DL (ref 0.57–1)
DEPRECATED RDW RBC AUTO: 50.5 FL (ref 37–54)
EOSINOPHIL # BLD AUTO: 0.29 10*3/MM3 (ref 0–0.4)
EOSINOPHIL NFR BLD AUTO: 6.9 % (ref 0.3–6.2)
ERYTHROCYTE [DISTWIDTH] IN BLOOD BY AUTOMATED COUNT: 15.3 % (ref 12.3–15.4)
GFR SERPL CREATININE-BSD FRML MDRD: 132 ML/MIN/1.73
GLOBULIN UR ELPH-MCNC: 3.3 GM/DL
GLUCOSE SERPL-MCNC: 89 MG/DL (ref 65–99)
HCT VFR BLD AUTO: 29.7 % (ref 34–46.6)
HGB BLD-MCNC: 9.6 G/DL (ref 12–15.9)
IMM GRANULOCYTES # BLD AUTO: 0.05 10*3/MM3 (ref 0–0.05)
IMM GRANULOCYTES NFR BLD AUTO: 1.2 % (ref 0–0.5)
LYMPHOCYTES # BLD AUTO: 1.65 10*3/MM3 (ref 0.7–3.1)
LYMPHOCYTES NFR BLD AUTO: 39 % (ref 19.6–45.3)
MCH RBC QN AUTO: 29.4 PG (ref 26.6–33)
MCHC RBC AUTO-ENTMCNC: 32.3 G/DL (ref 31.5–35.7)
MCV RBC AUTO: 91.1 FL (ref 79–97)
MONOCYTES # BLD AUTO: 0.59 10*3/MM3 (ref 0.1–0.9)
MONOCYTES NFR BLD AUTO: 13.9 % (ref 5–12)
NEUTROPHILS NFR BLD AUTO: 1.61 10*3/MM3 (ref 1.7–7)
NEUTROPHILS NFR BLD AUTO: 38.1 % (ref 42.7–76)
NRBC BLD AUTO-RTO: 0 /100 WBC (ref 0–0.2)
PLATELET # BLD AUTO: 192 10*3/MM3 (ref 140–450)
PMV BLD AUTO: 10.2 FL (ref 6–12)
POTASSIUM SERPL-SCNC: 4 MMOL/L (ref 3.5–5.2)
PREALB SERPL-MCNC: 14.6 MG/DL (ref 20–40)
PROT SERPL-MCNC: 6 G/DL (ref 6–8.5)
RBC # BLD AUTO: 3.26 10*6/MM3 (ref 3.77–5.28)
SODIUM SERPL-SCNC: 140 MMOL/L (ref 136–145)
WBC # BLD AUTO: 4.23 10*3/MM3 (ref 3.4–10.8)

## 2021-02-13 PROCEDURE — 84134 ASSAY OF PREALBUMIN: CPT | Performed by: INTERNAL MEDICINE

## 2021-02-13 PROCEDURE — 80053 COMPREHEN METABOLIC PANEL: CPT | Performed by: INTERNAL MEDICINE

## 2021-02-13 PROCEDURE — 97116 GAIT TRAINING THERAPY: CPT

## 2021-02-13 PROCEDURE — 25010000002 CEFAZOLIN PER 500 MG: Performed by: INTERNAL MEDICINE

## 2021-02-13 PROCEDURE — 63710000001 PREDNISONE PER 5 MG: Performed by: INTERNAL MEDICINE

## 2021-02-13 PROCEDURE — 97162 PT EVAL MOD COMPLEX 30 MIN: CPT

## 2021-02-13 PROCEDURE — 85025 COMPLETE CBC W/AUTO DIFF WBC: CPT | Performed by: INTERNAL MEDICINE

## 2021-02-13 PROCEDURE — 97166 OT EVAL MOD COMPLEX 45 MIN: CPT | Performed by: OCCUPATIONAL THERAPIST

## 2021-02-13 PROCEDURE — 97535 SELF CARE MNGMENT TRAINING: CPT | Performed by: OCCUPATIONAL THERAPIST

## 2021-02-13 NOTE — THERAPY DISCHARGE NOTE
Acute Care - Physical Therapy Discharge Summary  [facility]       Patient Name: Tawny Shin  : 1955  MRN: [id number]    Today's Date: 2021  Onset of Illness/Injury or Date of Surgery: 21       Referring Physician: Aldo      Admit Date: 2021      PT Recommendation and Plan    Visit Dx:    ICD-10-CM ICD-9-CM   1. Altered mental status, unspecified altered mental status type  R41.82 780.97   2. Syncope, unspecified syncope type  R55 780.2   3. Impaired mobility  Z74.09 799.89   4. Abscess  L02.91 682.9   5. Right hip pain  M25.551 719.45   6. Right hip pain, suspected infection  M25.551 719.45   7. Open wound of right hip, initial encounter  S71.001A 890.0               Rehab Goal Summary     Row Name 21 0734             Physical Therapy Goals    Wound Care Goal Selection (PT)  wound care, PT goal 1;wound care, PT goal 2  -      Problem Specific Goal Selection (PT)  problem specific goal 1, PT;problem specific goal 2, PT  -AH         Bed Mobility Goal 1 (PT)    Activity/Assistive Device (Bed Mobility Goal 1, PT)  bed mobility activities, all  -      [place] Level/Cues Needed (Bed Mobility Goal 1, PT)  independent  -AH      Time Frame (Bed Mobility Goal 1, PT)  long term goal (LTG);10 days  -      Progress/Outcomes (Bed Mobility Goal 1, PT)  goal met  -         Transfer Goal 1 (PT)    Activity/Assistive Device (Transfer Goal 1, PT)  sit-to-stand/stand-to-sit;bed-to-chair/chair-to-bed;walker, rolling  -      [place] Level/Cues Needed (Transfer Goal 1, PT)  standby assist  -      Time Frame (Transfer Goal 1, PT)  long term goal (LTG);10 days  -      Strategies/Barriers (Transfers Goal 1, PT)  maintaining TTWB RLE  -AH      Progress/Outcome (Transfer Goal 1, PT)  goal met  -         Gait Training Goal 1 (PT)    Activity/Assistive Device (Gait Training Goal 1, PT)  gait (walking locomotion);assistive device use;decrease fall risk;improve balance and speed;increase  endurance/gait distance;maintain weight-bearing status;walker, rolling  -AH      [place] Level (Gait Training Goal 1, PT)  modified independence  -AH      Distance (Gait Training Goal 1, PT)  30-40 ft maintaining TTWB R LE  -AH      Time Frame (Gait Training Goal 1, PT)  long term goal (LTG);10 days  -AH      Progress/Outcome (Gait Training Goal 1, PT)  goal not met  -AH         Wound Care Goal 1 (PT)    Wound Care Goal 1 (PT)  decrease length X width by 1.0 cm each to 15.6 X 8.0 cm  -AH      Time Frame (Wound Care Goal 1, PT)  long term goal (LTG);10 days  -AH      Progress/Outcome (Wound Care Goal 1, PT)  goal not met  -AH         Wound Care Goal 2 (PT)    Wound Care Goal 2 (PT)  decrease wound depth by 1.0 cm to 10 cm  -      Time Frame (Wound Care Goal 2, PT)  long term goal (LTG);10 days  -AH      Progress/Outcome (Wound Care Goal 2, PT)  goal not met  -AH         Stairs Goal 1 (PT)    Activity/Assistive Device (Stairs Goal 1, PT)  ascending stairs;descending stairs;step-to-step;decrease fall risk;improve balance and speed;maintain weight-bearing status;assistive device use;walker, rolling  -      [place] Level/Cues Needed (Stairs Goal 1, PT)  standby assist  -AH      Number of Stairs (Stairs Goal 1, PT)  1  -AH      Time Frame (Stairs Goal 1, PT)  long term goal (LTG);10 days  -AH      Progress/Outcome (Stairs Goal 1, PT)  goal not met  -AH         Problem Specific Goal 1 (PT)    Problem Specific Goal 1 (PT)  pt w/ 100% granulation tissue in wound bed  -AH      Time Frame (Problem Specific Goal 1, PT)  long-term goal (LTG);other (see comments) 10 days  -AH      Progress/Outcome (Problem Specific Goal 1, PT)  goal not met  -AH         Problem Specific Goal 2 (PT)    Problem Specific Goal 2 (PT)  pt to demonstrate knowledge and ability to manage home wound VAC unit prior to dc  -AH      Time Frame (Problem Specific Goal 2, PT)  long-term goal (LTG);other (see comments) 10 days  -AH       Progress/Outcome (Problem Specific Goal 2, PT)  goal not met  -        User Key  (r) = Recorded By, (t) = Taken By, (c) = Cosigned By    Initials Name Provider Type [facility] Olga Chambers PTA Physical Therapy Assistant PT              PT Discharge Summary  Reason for Discharge: Discharge from facility  Outcomes Achieved: Refer to plan of care for updates on goals achieved  Discharge Destination: Extended care facility - LTC      Olga Chambers PTA   2/13/2021

## 2021-02-14 LAB
BACTERIA FLD CULT: ABNORMAL
BACTERIA FLD CULT: ABNORMAL
GRAM STN SPEC: ABNORMAL
GRAM STN SPEC: ABNORMAL

## 2021-02-14 PROCEDURE — 63710000001 PREDNISONE PER 5 MG: Performed by: INTERNAL MEDICINE

## 2021-02-14 PROCEDURE — 97110 THERAPEUTIC EXERCISES: CPT

## 2021-02-14 PROCEDURE — 25010000002 CEFAZOLIN PER 500 MG: Performed by: INTERNAL MEDICINE

## 2021-02-14 RX ORDER — ZINC SULFATE 50(220)MG
220 CAPSULE ORAL DAILY
Status: DISCONTINUED | OUTPATIENT
Start: 2021-02-14 | End: 2021-03-06 | Stop reason: HOSPADM

## 2021-02-14 RX ORDER — MULTIPLE VITAMINS W/ MINERALS TAB 9MG-400MCG
1 TAB ORAL DAILY
Status: DISCONTINUED | OUTPATIENT
Start: 2021-02-14 | End: 2021-03-06 | Stop reason: HOSPADM

## 2021-02-14 RX ORDER — TROLAMINE SALICYLATE 10 G/100G
CREAM TOPICAL EVERY 4 HOURS PRN
Status: DISCONTINUED | OUTPATIENT
Start: 2021-02-14 | End: 2021-03-03

## 2021-02-14 RX ORDER — ASCORBIC ACID 500 MG
500 TABLET ORAL 2 TIMES DAILY
Status: DISCONTINUED | OUTPATIENT
Start: 2021-02-14 | End: 2021-03-06 | Stop reason: HOSPADM

## 2021-02-14 RX ORDER — CYCLOBENZAPRINE HCL 5 MG
5 TABLET ORAL 3 TIMES DAILY PRN
Status: DISCONTINUED | OUTPATIENT
Start: 2021-02-14 | End: 2021-02-14

## 2021-02-14 RX ORDER — CYCLOBENZAPRINE HCL 10 MG
10 TABLET ORAL 3 TIMES DAILY PRN
Status: DISCONTINUED | OUTPATIENT
Start: 2021-02-14 | End: 2021-03-06 | Stop reason: HOSPADM

## 2021-02-15 LAB
ALBUMIN SERPL-MCNC: 2.7 G/DL (ref 3.5–5.2)
ALBUMIN/GLOB SERPL: 1 G/DL
ALP SERPL-CCNC: 72 U/L (ref 39–117)
ALT SERPL W P-5'-P-CCNC: 10 U/L (ref 1–33)
ANION GAP SERPL CALCULATED.3IONS-SCNC: 8 MMOL/L (ref 5–15)
AST SERPL-CCNC: 24 U/L (ref 1–32)
BASOPHILS # BLD AUTO: 0.06 10*3/MM3 (ref 0–0.2)
BASOPHILS NFR BLD AUTO: 0.9 % (ref 0–1.5)
BILIRUB SERPL-MCNC: 0.4 MG/DL (ref 0–1.2)
BUN SERPL-MCNC: 8 MG/DL (ref 8–23)
BUN/CREAT SERPL: 20 (ref 7–25)
CALCIUM SPEC-SCNC: 9.2 MG/DL (ref 8.6–10.5)
CHLORIDE SERPL-SCNC: 104 MMOL/L (ref 98–107)
CO2 SERPL-SCNC: 28 MMOL/L (ref 22–29)
CREAT SERPL-MCNC: 0.4 MG/DL (ref 0.57–1)
CRP SERPL-MCNC: 5.04 MG/DL (ref 0–0.5)
DEPRECATED RDW RBC AUTO: 50.9 FL (ref 37–54)
EOSINOPHIL # BLD AUTO: 0.16 10*3/MM3 (ref 0–0.4)
EOSINOPHIL NFR BLD AUTO: 2.5 % (ref 0.3–6.2)
ERYTHROCYTE [DISTWIDTH] IN BLOOD BY AUTOMATED COUNT: 15.6 % (ref 12.3–15.4)
ERYTHROCYTE [SEDIMENTATION RATE] IN BLOOD: 21 MM/HR (ref 0–20)
GFR SERPL CREATININE-BSD FRML MDRD: >150 ML/MIN/1.73
GLOBULIN UR ELPH-MCNC: 2.8 GM/DL
GLUCOSE SERPL-MCNC: 98 MG/DL (ref 65–99)
HCT VFR BLD AUTO: 27.6 % (ref 34–46.6)
HGB BLD-MCNC: 9 G/DL (ref 12–15.9)
IMM GRANULOCYTES # BLD AUTO: 0.06 10*3/MM3 (ref 0–0.05)
IMM GRANULOCYTES NFR BLD AUTO: 0.9 % (ref 0–0.5)
LYMPHOCYTES # BLD AUTO: 1.82 10*3/MM3 (ref 0.7–3.1)
LYMPHOCYTES NFR BLD AUTO: 28.8 % (ref 19.6–45.3)
MCH RBC QN AUTO: 30 PG (ref 26.6–33)
MCHC RBC AUTO-ENTMCNC: 32.6 G/DL (ref 31.5–35.7)
MCV RBC AUTO: 92 FL (ref 79–97)
MONOCYTES # BLD AUTO: 0.75 10*3/MM3 (ref 0.1–0.9)
MONOCYTES NFR BLD AUTO: 11.8 % (ref 5–12)
NEUTROPHILS NFR BLD AUTO: 3.48 10*3/MM3 (ref 1.7–7)
NEUTROPHILS NFR BLD AUTO: 55.1 % (ref 42.7–76)
NRBC BLD AUTO-RTO: 0 /100 WBC (ref 0–0.2)
PLATELET # BLD AUTO: 199 10*3/MM3 (ref 140–450)
PMV BLD AUTO: 9.8 FL (ref 6–12)
POTASSIUM SERPL-SCNC: 4.1 MMOL/L (ref 3.5–5.2)
PROT SERPL-MCNC: 5.5 G/DL (ref 6–8.5)
RBC # BLD AUTO: 3 10*6/MM3 (ref 3.77–5.28)
SODIUM SERPL-SCNC: 140 MMOL/L (ref 136–145)
WBC # BLD AUTO: 6.33 10*3/MM3 (ref 3.4–10.8)

## 2021-02-15 PROCEDURE — 97530 THERAPEUTIC ACTIVITIES: CPT

## 2021-02-15 PROCEDURE — 63710000001 PREDNISONE PER 5 MG: Performed by: INTERNAL MEDICINE

## 2021-02-15 PROCEDURE — 25010000002 HYDROMORPHONE 1 MG/ML SOLUTION: Performed by: INTERNAL MEDICINE

## 2021-02-15 PROCEDURE — 97535 SELF CARE MNGMENT TRAINING: CPT

## 2021-02-15 PROCEDURE — 86140 C-REACTIVE PROTEIN: CPT | Performed by: INTERNAL MEDICINE

## 2021-02-15 PROCEDURE — 97606 NEG PRS WND THER DME>50 SQCM: CPT

## 2021-02-15 PROCEDURE — 97164 PT RE-EVAL EST PLAN CARE: CPT

## 2021-02-15 PROCEDURE — 80053 COMPREHEN METABOLIC PANEL: CPT | Performed by: INTERNAL MEDICINE

## 2021-02-15 PROCEDURE — 85651 RBC SED RATE NONAUTOMATED: CPT | Performed by: INTERNAL MEDICINE

## 2021-02-15 PROCEDURE — 85025 COMPLETE CBC W/AUTO DIFF WBC: CPT | Performed by: INTERNAL MEDICINE

## 2021-02-15 PROCEDURE — 25010000002 CEFAZOLIN PER 500 MG: Performed by: INTERNAL MEDICINE

## 2021-02-16 LAB — BACTERIA SPEC AEROBE CULT: NORMAL

## 2021-02-16 PROCEDURE — 25010000002 CEFAZOLIN PER 500 MG: Performed by: INTERNAL MEDICINE

## 2021-02-16 PROCEDURE — 97530 THERAPEUTIC ACTIVITIES: CPT | Performed by: OCCUPATIONAL THERAPIST

## 2021-02-16 PROCEDURE — 25010000002 MORPHINE PER 10 MG: Performed by: INTERNAL MEDICINE

## 2021-02-16 PROCEDURE — 97530 THERAPEUTIC ACTIVITIES: CPT

## 2021-02-16 PROCEDURE — 63710000001 PREDNISONE PER 5 MG: Performed by: INTERNAL MEDICINE

## 2021-02-16 PROCEDURE — 97110 THERAPEUTIC EXERCISES: CPT | Performed by: OCCUPATIONAL THERAPIST

## 2021-02-16 PROCEDURE — 97110 THERAPEUTIC EXERCISES: CPT

## 2021-02-17 LAB — BACTERIA SPEC AEROBE CULT: NORMAL

## 2021-02-17 PROCEDURE — 63710000001 PREDNISONE PER 5 MG: Performed by: INTERNAL MEDICINE

## 2021-02-17 PROCEDURE — 25010000002 CEFAZOLIN PER 500 MG: Performed by: INTERNAL MEDICINE

## 2021-02-17 PROCEDURE — 97606 NEG PRS WND THER DME>50 SQCM: CPT

## 2021-02-17 PROCEDURE — 97530 THERAPEUTIC ACTIVITIES: CPT

## 2021-02-17 PROCEDURE — 25010000002 HYDROMORPHONE 1 MG/ML SOLUTION: Performed by: INTERNAL MEDICINE

## 2021-02-17 PROCEDURE — 97116 GAIT TRAINING THERAPY: CPT

## 2021-02-17 RX ORDER — POLYETHYLENE GLYCOL 3350 17 G/17G
17 POWDER, FOR SOLUTION ORAL DAILY
Status: DISCONTINUED | OUTPATIENT
Start: 2021-02-17 | End: 2021-03-06 | Stop reason: HOSPADM

## 2021-02-17 RX ORDER — DOCUSATE SODIUM 100 MG/1
100 CAPSULE, LIQUID FILLED ORAL 2 TIMES DAILY
Status: DISCONTINUED | OUTPATIENT
Start: 2021-02-17 | End: 2021-03-06 | Stop reason: HOSPADM

## 2021-02-17 RX ORDER — VALACYCLOVIR HYDROCHLORIDE 500 MG/1
500 TABLET, FILM COATED ORAL
Status: DISCONTINUED | OUTPATIENT
Start: 2021-02-18 | End: 2021-03-06 | Stop reason: HOSPADM

## 2021-02-18 LAB
ANION GAP SERPL CALCULATED.3IONS-SCNC: 9 MMOL/L (ref 5–15)
BASOPHILS # BLD AUTO: 0.03 10*3/MM3 (ref 0–0.2)
BASOPHILS NFR BLD AUTO: 0.4 % (ref 0–1.5)
BUN SERPL-MCNC: 11 MG/DL (ref 8–23)
BUN/CREAT SERPL: 27.5 (ref 7–25)
CALCIUM SPEC-SCNC: 8.8 MG/DL (ref 8.6–10.5)
CHLORIDE SERPL-SCNC: 103 MMOL/L (ref 98–107)
CO2 SERPL-SCNC: 26 MMOL/L (ref 22–29)
CREAT SERPL-MCNC: 0.4 MG/DL (ref 0.57–1)
CRP SERPL-MCNC: 7.88 MG/DL (ref 0–0.5)
DEPRECATED RDW RBC AUTO: 49.1 FL (ref 37–54)
EOSINOPHIL # BLD AUTO: 0.19 10*3/MM3 (ref 0–0.4)
EOSINOPHIL NFR BLD AUTO: 2.6 % (ref 0.3–6.2)
ERYTHROCYTE [DISTWIDTH] IN BLOOD BY AUTOMATED COUNT: 15.2 % (ref 12.3–15.4)
ERYTHROCYTE [SEDIMENTATION RATE] IN BLOOD: 53 MM/HR (ref 0–20)
GFR SERPL CREATININE-BSD FRML MDRD: >150 ML/MIN/1.73
GLUCOSE SERPL-MCNC: 126 MG/DL (ref 65–99)
HCT VFR BLD AUTO: 26.1 % (ref 34–46.6)
HGB BLD-MCNC: 8.7 G/DL (ref 12–15.9)
IMM GRANULOCYTES # BLD AUTO: 0.1 10*3/MM3 (ref 0–0.05)
IMM GRANULOCYTES NFR BLD AUTO: 1.4 % (ref 0–0.5)
LYMPHOCYTES # BLD AUTO: 1.94 10*3/MM3 (ref 0.7–3.1)
LYMPHOCYTES NFR BLD AUTO: 26.7 % (ref 19.6–45.3)
MCH RBC QN AUTO: 30.1 PG (ref 26.6–33)
MCHC RBC AUTO-ENTMCNC: 33.3 G/DL (ref 31.5–35.7)
MCV RBC AUTO: 90.3 FL (ref 79–97)
MONOCYTES # BLD AUTO: 0.78 10*3/MM3 (ref 0.1–0.9)
MONOCYTES NFR BLD AUTO: 10.7 % (ref 5–12)
NEUTROPHILS NFR BLD AUTO: 4.22 10*3/MM3 (ref 1.7–7)
NEUTROPHILS NFR BLD AUTO: 58.2 % (ref 42.7–76)
NRBC BLD AUTO-RTO: 0 /100 WBC (ref 0–0.2)
PLATELET # BLD AUTO: 227 10*3/MM3 (ref 140–450)
PMV BLD AUTO: 10.2 FL (ref 6–12)
POTASSIUM SERPL-SCNC: 3.6 MMOL/L (ref 3.5–5.2)
RBC # BLD AUTO: 2.89 10*6/MM3 (ref 3.77–5.28)
SODIUM SERPL-SCNC: 138 MMOL/L (ref 136–145)
WBC # BLD AUTO: 7.26 10*3/MM3 (ref 3.4–10.8)

## 2021-02-18 PROCEDURE — 97530 THERAPEUTIC ACTIVITIES: CPT

## 2021-02-18 PROCEDURE — 85651 RBC SED RATE NONAUTOMATED: CPT | Performed by: NURSE PRACTITIONER

## 2021-02-18 PROCEDURE — 25010000002 CEFAZOLIN PER 500 MG: Performed by: INTERNAL MEDICINE

## 2021-02-18 PROCEDURE — 85025 COMPLETE CBC W/AUTO DIFF WBC: CPT | Performed by: NURSE PRACTITIONER

## 2021-02-18 PROCEDURE — 63710000001 PREDNISONE PER 5 MG: Performed by: INTERNAL MEDICINE

## 2021-02-18 PROCEDURE — 80048 BASIC METABOLIC PNL TOTAL CA: CPT | Performed by: NURSE PRACTITIONER

## 2021-02-18 PROCEDURE — 86140 C-REACTIVE PROTEIN: CPT | Performed by: NURSE PRACTITIONER

## 2021-02-19 PROCEDURE — 25010000002 CEFAZOLIN PER 500 MG: Performed by: INTERNAL MEDICINE

## 2021-02-19 PROCEDURE — 63710000001 PREDNISONE PER 5 MG: Performed by: INTERNAL MEDICINE

## 2021-02-19 PROCEDURE — 97606 NEG PRS WND THER DME>50 SQCM: CPT

## 2021-02-19 PROCEDURE — 25010000002 HYDROMORPHONE 1 MG/ML SOLUTION: Performed by: INTERNAL MEDICINE

## 2021-02-19 PROCEDURE — 97535 SELF CARE MNGMENT TRAINING: CPT

## 2021-02-19 RX ORDER — TRAMADOL HYDROCHLORIDE 50 MG/1
50 TABLET ORAL EVERY 6 HOURS PRN
Status: DISCONTINUED | OUTPATIENT
Start: 2021-02-19 | End: 2021-02-25

## 2021-02-19 RX ORDER — HYDROCODONE BITARTRATE AND ACETAMINOPHEN 7.5; 325 MG/1; MG/1
1 TABLET ORAL EVERY 4 HOURS PRN
Status: DISCONTINUED | OUTPATIENT
Start: 2021-02-19 | End: 2021-02-25

## 2021-02-20 PROCEDURE — 25010000002 CEFAZOLIN PER 500 MG: Performed by: INTERNAL MEDICINE

## 2021-02-20 PROCEDURE — 63710000001 PREDNISONE PER 5 MG: Performed by: INTERNAL MEDICINE

## 2021-02-20 PROCEDURE — 97110 THERAPEUTIC EXERCISES: CPT

## 2021-02-20 PROCEDURE — 25010000002 MORPHINE PER 10 MG: Performed by: INTERNAL MEDICINE

## 2021-02-20 PROCEDURE — 97530 THERAPEUTIC ACTIVITIES: CPT

## 2021-02-20 RX ORDER — BISACODYL 10 MG
10 SUPPOSITORY, RECTAL RECTAL DAILY PRN
Status: DISCONTINUED | OUTPATIENT
Start: 2021-02-20 | End: 2021-03-06 | Stop reason: HOSPADM

## 2021-02-21 ENCOUNTER — APPOINTMENT (OUTPATIENT)
Dept: GENERAL RADIOLOGY | Facility: HOSPITAL | Age: 66
End: 2021-02-21

## 2021-02-21 PROCEDURE — 97110 THERAPEUTIC EXERCISES: CPT

## 2021-02-21 PROCEDURE — 25010000002 CEFAZOLIN PER 500 MG: Performed by: INTERNAL MEDICINE

## 2021-02-21 PROCEDURE — 97530 THERAPEUTIC ACTIVITIES: CPT

## 2021-02-21 PROCEDURE — 74018 RADEX ABDOMEN 1 VIEW: CPT

## 2021-02-21 PROCEDURE — 63710000001 PREDNISONE PER 5 MG: Performed by: INTERNAL MEDICINE

## 2021-02-21 RX ORDER — BISACODYL 10 MG
10 SUPPOSITORY, RECTAL RECTAL
Status: ACTIVE | OUTPATIENT
Start: 2021-02-21 | End: 2021-02-22

## 2021-02-22 ENCOUNTER — APPOINTMENT (OUTPATIENT)
Dept: MRI IMAGING | Facility: HOSPITAL | Age: 66
End: 2021-02-22

## 2021-02-22 LAB
ANION GAP SERPL CALCULATED.3IONS-SCNC: 7 MMOL/L (ref 5–15)
BASOPHILS # BLD AUTO: 0.05 10*3/MM3 (ref 0–0.2)
BASOPHILS NFR BLD AUTO: 0.8 % (ref 0–1.5)
BUN SERPL-MCNC: 10 MG/DL (ref 8–23)
BUN/CREAT SERPL: 21.7 (ref 7–25)
CALCIUM SPEC-SCNC: 9 MG/DL (ref 8.6–10.5)
CHLORIDE SERPL-SCNC: 100 MMOL/L (ref 98–107)
CO2 SERPL-SCNC: 30 MMOL/L (ref 22–29)
CREAT SERPL-MCNC: 0.46 MG/DL (ref 0.57–1)
CRP SERPL-MCNC: 13.1 MG/DL (ref 0–0.5)
DEPRECATED RDW RBC AUTO: 50.2 FL (ref 37–54)
EOSINOPHIL # BLD AUTO: 0.17 10*3/MM3 (ref 0–0.4)
EOSINOPHIL NFR BLD AUTO: 2.6 % (ref 0.3–6.2)
ERYTHROCYTE [DISTWIDTH] IN BLOOD BY AUTOMATED COUNT: 15.1 % (ref 12.3–15.4)
ERYTHROCYTE [SEDIMENTATION RATE] IN BLOOD: 49 MM/HR (ref 0–20)
GFR SERPL CREATININE-BSD FRML MDRD: 135 ML/MIN/1.73
GLUCOSE SERPL-MCNC: 95 MG/DL (ref 65–99)
HCT VFR BLD AUTO: 26.2 % (ref 34–46.6)
HGB BLD-MCNC: 8.3 G/DL (ref 12–15.9)
IMM GRANULOCYTES # BLD AUTO: 0.04 10*3/MM3 (ref 0–0.05)
IMM GRANULOCYTES NFR BLD AUTO: 0.6 % (ref 0–0.5)
LYMPHOCYTES # BLD AUTO: 1.6 10*3/MM3 (ref 0.7–3.1)
LYMPHOCYTES NFR BLD AUTO: 24.8 % (ref 19.6–45.3)
MCH RBC QN AUTO: 28.6 PG (ref 26.6–33)
MCHC RBC AUTO-ENTMCNC: 31.7 G/DL (ref 31.5–35.7)
MCV RBC AUTO: 90.3 FL (ref 79–97)
MONOCYTES # BLD AUTO: 0.87 10*3/MM3 (ref 0.1–0.9)
MONOCYTES NFR BLD AUTO: 13.5 % (ref 5–12)
NEUTROPHILS NFR BLD AUTO: 3.73 10*3/MM3 (ref 1.7–7)
NEUTROPHILS NFR BLD AUTO: 57.7 % (ref 42.7–76)
NRBC BLD AUTO-RTO: 0 /100 WBC (ref 0–0.2)
PLATELET # BLD AUTO: 267 10*3/MM3 (ref 140–450)
PMV BLD AUTO: 10.3 FL (ref 6–12)
POTASSIUM SERPL-SCNC: 4.2 MMOL/L (ref 3.5–5.2)
RBC # BLD AUTO: 2.9 10*6/MM3 (ref 3.77–5.28)
SODIUM SERPL-SCNC: 137 MMOL/L (ref 136–145)
WBC # BLD AUTO: 6.46 10*3/MM3 (ref 3.4–10.8)

## 2021-02-22 PROCEDURE — 85025 COMPLETE CBC W/AUTO DIFF WBC: CPT | Performed by: INTERNAL MEDICINE

## 2021-02-22 PROCEDURE — 97530 THERAPEUTIC ACTIVITIES: CPT

## 2021-02-22 PROCEDURE — A9577 INJ MULTIHANCE: HCPCS

## 2021-02-22 PROCEDURE — 72158 MRI LUMBAR SPINE W/O & W/DYE: CPT

## 2021-02-22 PROCEDURE — 86140 C-REACTIVE PROTEIN: CPT | Performed by: INTERNAL MEDICINE

## 2021-02-22 PROCEDURE — 0 GADOBENATE DIMEGLUMINE 529 MG/ML SOLUTION

## 2021-02-22 PROCEDURE — 85651 RBC SED RATE NONAUTOMATED: CPT | Performed by: INTERNAL MEDICINE

## 2021-02-22 PROCEDURE — 25010000002 CEFAZOLIN PER 500 MG: Performed by: INTERNAL MEDICINE

## 2021-02-22 PROCEDURE — 80048 BASIC METABOLIC PNL TOTAL CA: CPT | Performed by: INTERNAL MEDICINE

## 2021-02-22 PROCEDURE — 25010000002 HYDROMORPHONE 1 MG/ML SOLUTION: Performed by: INTERNAL MEDICINE

## 2021-02-22 PROCEDURE — 63710000001 PREDNISONE PER 5 MG: Performed by: INTERNAL MEDICINE

## 2021-02-22 PROCEDURE — 97605 NEG PRS WND THER DME<=50SQCM: CPT

## 2021-02-22 RX ORDER — LACTULOSE 20 G/30ML
20 SOLUTION ORAL
Status: DISPENSED | OUTPATIENT
Start: 2021-02-22 | End: 2021-02-22

## 2021-02-23 LAB
ALBUMIN SERPL-MCNC: 2.5 G/DL (ref 3.5–5.2)
ALBUMIN/GLOB SERPL: 0.6 G/DL
ALP SERPL-CCNC: 128 U/L (ref 39–117)
ALT SERPL W P-5'-P-CCNC: 7 U/L (ref 1–33)
ANION GAP SERPL CALCULATED.3IONS-SCNC: 9 MMOL/L (ref 5–15)
AST SERPL-CCNC: 34 U/L (ref 1–32)
BASOPHILS # BLD AUTO: 0.08 10*3/MM3 (ref 0–0.2)
BASOPHILS NFR BLD AUTO: 1.2 % (ref 0–1.5)
BILIRUB SERPL-MCNC: 0.4 MG/DL (ref 0–1.2)
BUN SERPL-MCNC: 7 MG/DL (ref 8–23)
BUN/CREAT SERPL: 15.9 (ref 7–25)
CALCIUM SPEC-SCNC: 8.9 MG/DL (ref 8.6–10.5)
CHLORIDE SERPL-SCNC: 99 MMOL/L (ref 98–107)
CO2 SERPL-SCNC: 29 MMOL/L (ref 22–29)
CREAT SERPL-MCNC: 0.44 MG/DL (ref 0.57–1)
CRP SERPL-MCNC: 11.1 MG/DL (ref 0–0.5)
DEPRECATED RDW RBC AUTO: 48.1 FL (ref 37–54)
EOSINOPHIL # BLD AUTO: 0.13 10*3/MM3 (ref 0–0.4)
EOSINOPHIL NFR BLD AUTO: 1.9 % (ref 0.3–6.2)
ERYTHROCYTE [DISTWIDTH] IN BLOOD BY AUTOMATED COUNT: 15.1 % (ref 12.3–15.4)
GFR SERPL CREATININE-BSD FRML MDRD: 143 ML/MIN/1.73
GLOBULIN UR ELPH-MCNC: 4.2 GM/DL
GLUCOSE SERPL-MCNC: 128 MG/DL (ref 65–99)
HCT VFR BLD AUTO: 27.8 % (ref 34–46.6)
HGB BLD-MCNC: 9.1 G/DL (ref 12–15.9)
IMM GRANULOCYTES # BLD AUTO: 0.03 10*3/MM3 (ref 0–0.05)
IMM GRANULOCYTES NFR BLD AUTO: 0.4 % (ref 0–0.5)
LYMPHOCYTES # BLD AUTO: 1.16 10*3/MM3 (ref 0.7–3.1)
LYMPHOCYTES NFR BLD AUTO: 16.9 % (ref 19.6–45.3)
MCH RBC QN AUTO: 28.6 PG (ref 26.6–33)
MCHC RBC AUTO-ENTMCNC: 32.7 G/DL (ref 31.5–35.7)
MCV RBC AUTO: 87.4 FL (ref 79–97)
MONOCYTES # BLD AUTO: 1.06 10*3/MM3 (ref 0.1–0.9)
MONOCYTES NFR BLD AUTO: 15.4 % (ref 5–12)
NEUTROPHILS NFR BLD AUTO: 4.41 10*3/MM3 (ref 1.7–7)
NEUTROPHILS NFR BLD AUTO: 64.2 % (ref 42.7–76)
NRBC BLD AUTO-RTO: 0 /100 WBC (ref 0–0.2)
PLATELET # BLD AUTO: 300 10*3/MM3 (ref 140–450)
PMV BLD AUTO: 9.9 FL (ref 6–12)
POTASSIUM SERPL-SCNC: 4 MMOL/L (ref 3.5–5.2)
PROT SERPL-MCNC: 6.7 G/DL (ref 6–8.5)
RBC # BLD AUTO: 3.18 10*6/MM3 (ref 3.77–5.28)
SODIUM SERPL-SCNC: 137 MMOL/L (ref 136–145)
WBC # BLD AUTO: 6.87 10*3/MM3 (ref 3.4–10.8)

## 2021-02-23 PROCEDURE — 86140 C-REACTIVE PROTEIN: CPT | Performed by: INTERNAL MEDICINE

## 2021-02-23 PROCEDURE — 85025 COMPLETE CBC W/AUTO DIFF WBC: CPT | Performed by: INTERNAL MEDICINE

## 2021-02-23 PROCEDURE — 63710000001 PREDNISONE PER 5 MG: Performed by: INTERNAL MEDICINE

## 2021-02-23 PROCEDURE — 97530 THERAPEUTIC ACTIVITIES: CPT

## 2021-02-23 PROCEDURE — 80053 COMPREHEN METABOLIC PANEL: CPT | Performed by: INTERNAL MEDICINE

## 2021-02-23 PROCEDURE — 97110 THERAPEUTIC EXERCISES: CPT

## 2021-02-23 PROCEDURE — 25010000002 CEFAZOLIN PER 500 MG: Performed by: INTERNAL MEDICINE

## 2021-02-23 PROCEDURE — 97535 SELF CARE MNGMENT TRAINING: CPT

## 2021-02-24 ENCOUNTER — APPOINTMENT (OUTPATIENT)
Dept: MRI IMAGING | Facility: HOSPITAL | Age: 66
End: 2021-02-24

## 2021-02-24 PROCEDURE — 97530 THERAPEUTIC ACTIVITIES: CPT

## 2021-02-24 PROCEDURE — 97606 NEG PRS WND THER DME>50 SQCM: CPT

## 2021-02-24 PROCEDURE — 72156 MRI NECK SPINE W/O & W/DYE: CPT

## 2021-02-24 PROCEDURE — 99222 1ST HOSP IP/OBS MODERATE 55: CPT | Performed by: NEUROLOGICAL SURGERY

## 2021-02-24 PROCEDURE — 25010000002 HYDROMORPHONE 1 MG/ML SOLUTION: Performed by: INTERNAL MEDICINE

## 2021-02-24 PROCEDURE — A9577 INJ MULTIHANCE: HCPCS

## 2021-02-24 PROCEDURE — 97110 THERAPEUTIC EXERCISES: CPT

## 2021-02-24 PROCEDURE — 25010000002 CEFAZOLIN PER 500 MG: Performed by: INTERNAL MEDICINE

## 2021-02-24 PROCEDURE — 63710000001 PREDNISONE PER 5 MG: Performed by: INTERNAL MEDICINE

## 2021-02-24 PROCEDURE — 0 GADOBENATE DIMEGLUMINE 529 MG/ML SOLUTION

## 2021-02-25 LAB
ANION GAP SERPL CALCULATED.3IONS-SCNC: 8 MMOL/L (ref 5–15)
BASOPHILS # BLD AUTO: 0.06 10*3/MM3 (ref 0–0.2)
BASOPHILS NFR BLD AUTO: 0.9 % (ref 0–1.5)
BUN SERPL-MCNC: 9 MG/DL (ref 8–23)
BUN/CREAT SERPL: 20.5 (ref 7–25)
CALCIUM SPEC-SCNC: 8.8 MG/DL (ref 8.6–10.5)
CHLORIDE SERPL-SCNC: 100 MMOL/L (ref 98–107)
CO2 SERPL-SCNC: 29 MMOL/L (ref 22–29)
CREAT SERPL-MCNC: 0.44 MG/DL (ref 0.57–1)
CRP SERPL-MCNC: 12.46 MG/DL (ref 0–0.5)
DEPRECATED RDW RBC AUTO: 48.6 FL (ref 37–54)
EOSINOPHIL # BLD AUTO: 0.12 10*3/MM3 (ref 0–0.4)
EOSINOPHIL NFR BLD AUTO: 1.8 % (ref 0.3–6.2)
ERYTHROCYTE [DISTWIDTH] IN BLOOD BY AUTOMATED COUNT: 15.2 % (ref 12.3–15.4)
ERYTHROCYTE [SEDIMENTATION RATE] IN BLOOD: 99 MM/HR (ref 0–20)
GFR SERPL CREATININE-BSD FRML MDRD: 143 ML/MIN/1.73
GLUCOSE SERPL-MCNC: 124 MG/DL (ref 65–99)
HCT VFR BLD AUTO: 27.2 % (ref 34–46.6)
HGB BLD-MCNC: 8.7 G/DL (ref 12–15.9)
IMM GRANULOCYTES # BLD AUTO: 0.04 10*3/MM3 (ref 0–0.05)
IMM GRANULOCYTES NFR BLD AUTO: 0.6 % (ref 0–0.5)
LYMPHOCYTES # BLD AUTO: 1.53 10*3/MM3 (ref 0.7–3.1)
LYMPHOCYTES NFR BLD AUTO: 22.9 % (ref 19.6–45.3)
MCH RBC QN AUTO: 28.3 PG (ref 26.6–33)
MCHC RBC AUTO-ENTMCNC: 32 G/DL (ref 31.5–35.7)
MCV RBC AUTO: 88.6 FL (ref 79–97)
MONOCYTES # BLD AUTO: 0.97 10*3/MM3 (ref 0.1–0.9)
MONOCYTES NFR BLD AUTO: 14.5 % (ref 5–12)
NEUTROPHILS NFR BLD AUTO: 3.96 10*3/MM3 (ref 1.7–7)
NEUTROPHILS NFR BLD AUTO: 59.3 % (ref 42.7–76)
NRBC BLD AUTO-RTO: 0 /100 WBC (ref 0–0.2)
PLATELET # BLD AUTO: 321 10*3/MM3 (ref 140–450)
PMV BLD AUTO: 10.2 FL (ref 6–12)
POTASSIUM SERPL-SCNC: 3.8 MMOL/L (ref 3.5–5.2)
RBC # BLD AUTO: 3.07 10*6/MM3 (ref 3.77–5.28)
SODIUM SERPL-SCNC: 137 MMOL/L (ref 136–145)
WBC # BLD AUTO: 6.68 10*3/MM3 (ref 3.4–10.8)

## 2021-02-25 PROCEDURE — 63710000001 PREDNISONE PER 5 MG: Performed by: INTERNAL MEDICINE

## 2021-02-25 PROCEDURE — 99231 SBSQ HOSP IP/OBS SF/LOW 25: CPT | Performed by: NEUROLOGICAL SURGERY

## 2021-02-25 PROCEDURE — 97110 THERAPEUTIC EXERCISES: CPT

## 2021-02-25 PROCEDURE — 85651 RBC SED RATE NONAUTOMATED: CPT | Performed by: INTERNAL MEDICINE

## 2021-02-25 PROCEDURE — 97530 THERAPEUTIC ACTIVITIES: CPT

## 2021-02-25 PROCEDURE — 86140 C-REACTIVE PROTEIN: CPT | Performed by: INTERNAL MEDICINE

## 2021-02-25 PROCEDURE — 25010000002 CEFAZOLIN PER 500 MG: Performed by: INTERNAL MEDICINE

## 2021-02-25 PROCEDURE — 85025 COMPLETE CBC W/AUTO DIFF WBC: CPT | Performed by: INTERNAL MEDICINE

## 2021-02-25 PROCEDURE — 80048 BASIC METABOLIC PNL TOTAL CA: CPT | Performed by: INTERNAL MEDICINE

## 2021-02-25 RX ORDER — HYDROCODONE BITARTRATE AND ACETAMINOPHEN 7.5; 325 MG/1; MG/1
1 TABLET ORAL EVERY 4 HOURS PRN
Status: DISCONTINUED | OUTPATIENT
Start: 2021-02-25 | End: 2021-02-28

## 2021-02-25 RX ORDER — TRAMADOL HYDROCHLORIDE 50 MG/1
50 TABLET ORAL EVERY 6 HOURS PRN
Status: DISCONTINUED | OUTPATIENT
Start: 2021-02-25 | End: 2021-03-03

## 2021-02-26 ENCOUNTER — APPOINTMENT (OUTPATIENT)
Dept: CT IMAGING | Facility: HOSPITAL | Age: 66
End: 2021-02-26

## 2021-02-26 LAB
ALBUMIN SERPL-MCNC: 2.5 G/DL (ref 3.5–5.2)
ALBUMIN/GLOB SERPL: 0.7 G/DL
ALP SERPL-CCNC: 103 U/L (ref 39–117)
ALT SERPL W P-5'-P-CCNC: <5 U/L (ref 1–33)
ANION GAP SERPL CALCULATED.3IONS-SCNC: 7 MMOL/L (ref 5–15)
APTT PPP: 52.1 SECONDS (ref 24.1–35)
AST SERPL-CCNC: 20 U/L (ref 1–32)
BASOPHILS # BLD AUTO: 0.05 10*3/MM3 (ref 0–0.2)
BASOPHILS NFR BLD AUTO: 0.8 % (ref 0–1.5)
BILIRUB SERPL-MCNC: 0.4 MG/DL (ref 0–1.2)
BUN SERPL-MCNC: 9 MG/DL (ref 8–23)
BUN/CREAT SERPL: 19.6 (ref 7–25)
CALCIUM SPEC-SCNC: 8.9 MG/DL (ref 8.6–10.5)
CHLORIDE SERPL-SCNC: 100 MMOL/L (ref 98–107)
CO2 SERPL-SCNC: 29 MMOL/L (ref 22–29)
CREAT SERPL-MCNC: 0.46 MG/DL (ref 0.57–1)
CRP SERPL-MCNC: 16.72 MG/DL (ref 0–0.5)
DEPRECATED RDW RBC AUTO: 49.1 FL (ref 37–54)
EOSINOPHIL # BLD AUTO: 0.16 10*3/MM3 (ref 0–0.4)
EOSINOPHIL NFR BLD AUTO: 2.5 % (ref 0.3–6.2)
ERYTHROCYTE [DISTWIDTH] IN BLOOD BY AUTOMATED COUNT: 15.3 % (ref 12.3–15.4)
GFR SERPL CREATININE-BSD FRML MDRD: 135 ML/MIN/1.73
GLOBULIN UR ELPH-MCNC: 3.8 GM/DL
GLUCOSE SERPL-MCNC: 94 MG/DL (ref 65–99)
HCT VFR BLD AUTO: 27.7 % (ref 34–46.6)
HGB BLD-MCNC: 8.8 G/DL (ref 12–15.9)
IMM GRANULOCYTES # BLD AUTO: 0.03 10*3/MM3 (ref 0–0.05)
IMM GRANULOCYTES NFR BLD AUTO: 0.5 % (ref 0–0.5)
INR PPP: 1.81 (ref 0.91–1.09)
LYMPHOCYTES # BLD AUTO: 1.49 10*3/MM3 (ref 0.7–3.1)
LYMPHOCYTES NFR BLD AUTO: 23.3 % (ref 19.6–45.3)
MCH RBC QN AUTO: 28.3 PG (ref 26.6–33)
MCHC RBC AUTO-ENTMCNC: 31.8 G/DL (ref 31.5–35.7)
MCV RBC AUTO: 89.1 FL (ref 79–97)
MONOCYTES # BLD AUTO: 0.93 10*3/MM3 (ref 0.1–0.9)
MONOCYTES NFR BLD AUTO: 14.6 % (ref 5–12)
NEUTROPHILS NFR BLD AUTO: 3.73 10*3/MM3 (ref 1.7–7)
NEUTROPHILS NFR BLD AUTO: 58.3 % (ref 42.7–76)
NRBC BLD AUTO-RTO: 0 /100 WBC (ref 0–0.2)
PLATELET # BLD AUTO: 299 10*3/MM3 (ref 140–450)
PMV BLD AUTO: 9.8 FL (ref 6–12)
POTASSIUM SERPL-SCNC: 4.1 MMOL/L (ref 3.5–5.2)
PROT SERPL-MCNC: 6.3 G/DL (ref 6–8.5)
PROTHROMBIN TIME: 20.7 SECONDS (ref 11.9–14.6)
RBC # BLD AUTO: 3.11 10*6/MM3 (ref 3.77–5.28)
SODIUM SERPL-SCNC: 136 MMOL/L (ref 136–145)
WBC # BLD AUTO: 6.39 10*3/MM3 (ref 3.4–10.8)

## 2021-02-26 PROCEDURE — 97530 THERAPEUTIC ACTIVITIES: CPT

## 2021-02-26 PROCEDURE — 85025 COMPLETE CBC W/AUTO DIFF WBC: CPT | Performed by: INTERNAL MEDICINE

## 2021-02-26 PROCEDURE — 80053 COMPREHEN METABOLIC PANEL: CPT | Performed by: INTERNAL MEDICINE

## 2021-02-26 PROCEDURE — 85610 PROTHROMBIN TIME: CPT | Performed by: INTERNAL MEDICINE

## 2021-02-26 PROCEDURE — 25010000002 IOPAMIDOL 61 % SOLUTION: Performed by: INTERNAL MEDICINE

## 2021-02-26 PROCEDURE — 86140 C-REACTIVE PROTEIN: CPT | Performed by: INTERNAL MEDICINE

## 2021-02-26 PROCEDURE — 85730 THROMBOPLASTIN TIME PARTIAL: CPT | Performed by: INTERNAL MEDICINE

## 2021-02-26 PROCEDURE — 25010000002 CEFAZOLIN PER 500 MG: Performed by: INTERNAL MEDICINE

## 2021-02-26 PROCEDURE — 74177 CT ABD & PELVIS W/CONTRAST: CPT

## 2021-02-26 PROCEDURE — 25010000002 HYDROMORPHONE 1 MG/ML SOLUTION: Performed by: INTERNAL MEDICINE

## 2021-02-26 PROCEDURE — 25010000002 MORPHINE PER 10 MG: Performed by: INTERNAL MEDICINE

## 2021-02-26 PROCEDURE — 97606 NEG PRS WND THER DME>50 SQCM: CPT

## 2021-02-26 PROCEDURE — 63710000001 PREDNISONE PER 5 MG: Performed by: INTERNAL MEDICINE

## 2021-02-26 RX ORDER — CLOPIDOGREL BISULFATE 75 MG/1
75 TABLET ORAL DAILY
Status: DISCONTINUED | OUTPATIENT
Start: 2021-03-03 | End: 2021-03-01

## 2021-02-26 RX ORDER — BUDESONIDE 0.5 MG/2ML
0.5 INHALANT ORAL 2 TIMES DAILY PRN
Status: DISCONTINUED | OUTPATIENT
Start: 2021-02-26 | End: 2021-03-06 | Stop reason: HOSPADM

## 2021-02-26 RX ORDER — ASPIRIN 81 MG/1
81 TABLET ORAL DAILY
Status: DISCONTINUED | OUTPATIENT
Start: 2021-03-03 | End: 2021-03-01

## 2021-02-26 RX ADMIN — IOPAMIDOL 100 ML: 612 INJECTION, SOLUTION INTRAVENOUS at 09:00

## 2021-02-27 LAB
BASOPHILS # BLD AUTO: 0.07 10*3/MM3 (ref 0–0.2)
BASOPHILS NFR BLD AUTO: 1.1 % (ref 0–1.5)
CRP SERPL-MCNC: 16.05 MG/DL (ref 0–0.5)
DEPRECATED RDW RBC AUTO: 48.7 FL (ref 37–54)
EOSINOPHIL # BLD AUTO: 0.13 10*3/MM3 (ref 0–0.4)
EOSINOPHIL NFR BLD AUTO: 2 % (ref 0.3–6.2)
ERYTHROCYTE [DISTWIDTH] IN BLOOD BY AUTOMATED COUNT: 15.2 % (ref 12.3–15.4)
HCT VFR BLD AUTO: 24.5 % (ref 34–46.6)
HGB BLD-MCNC: 7.8 G/DL (ref 12–15.9)
IMM GRANULOCYTES # BLD AUTO: 0.04 10*3/MM3 (ref 0–0.05)
IMM GRANULOCYTES NFR BLD AUTO: 0.6 % (ref 0–0.5)
LYMPHOCYTES # BLD AUTO: 1.78 10*3/MM3 (ref 0.7–3.1)
LYMPHOCYTES NFR BLD AUTO: 28 % (ref 19.6–45.3)
MCH RBC QN AUTO: 28.4 PG (ref 26.6–33)
MCHC RBC AUTO-ENTMCNC: 31.8 G/DL (ref 31.5–35.7)
MCV RBC AUTO: 89.1 FL (ref 79–97)
MONOCYTES # BLD AUTO: 1.06 10*3/MM3 (ref 0.1–0.9)
MONOCYTES NFR BLD AUTO: 16.7 % (ref 5–12)
NEUTROPHILS NFR BLD AUTO: 3.28 10*3/MM3 (ref 1.7–7)
NEUTROPHILS NFR BLD AUTO: 51.6 % (ref 42.7–76)
NRBC BLD AUTO-RTO: 0 /100 WBC (ref 0–0.2)
PLATELET # BLD AUTO: 272 10*3/MM3 (ref 140–450)
PMV BLD AUTO: 10.3 FL (ref 6–12)
RBC # BLD AUTO: 2.75 10*6/MM3 (ref 3.77–5.28)
WBC # BLD AUTO: 6.36 10*3/MM3 (ref 3.4–10.8)

## 2021-02-27 PROCEDURE — 63710000001 PREDNISONE PER 5 MG: Performed by: INTERNAL MEDICINE

## 2021-02-27 PROCEDURE — 86140 C-REACTIVE PROTEIN: CPT | Performed by: INTERNAL MEDICINE

## 2021-02-27 PROCEDURE — 25010000002 CEFAZOLIN PER 500 MG: Performed by: INTERNAL MEDICINE

## 2021-02-27 PROCEDURE — 85025 COMPLETE CBC W/AUTO DIFF WBC: CPT | Performed by: INTERNAL MEDICINE

## 2021-02-27 PROCEDURE — 97530 THERAPEUTIC ACTIVITIES: CPT

## 2021-02-28 ENCOUNTER — APPOINTMENT (OUTPATIENT)
Dept: MRI IMAGING | Facility: HOSPITAL | Age: 66
End: 2021-02-28

## 2021-02-28 LAB
BASOPHILS # BLD AUTO: 0.03 10*3/MM3 (ref 0–0.2)
BASOPHILS NFR BLD AUTO: 0.6 % (ref 0–1.5)
CRP SERPL-MCNC: 14.57 MG/DL (ref 0–0.5)
DEPRECATED RDW RBC AUTO: 47 FL (ref 37–54)
EOSINOPHIL # BLD AUTO: 0.19 10*3/MM3 (ref 0–0.4)
EOSINOPHIL NFR BLD AUTO: 3.5 % (ref 0.3–6.2)
ERYTHROCYTE [DISTWIDTH] IN BLOOD BY AUTOMATED COUNT: 15.2 % (ref 12.3–15.4)
HCT VFR BLD AUTO: 26.4 % (ref 34–46.6)
HGB BLD-MCNC: 8.7 G/DL (ref 12–15.9)
IMM GRANULOCYTES # BLD AUTO: 0.05 10*3/MM3 (ref 0–0.05)
IMM GRANULOCYTES NFR BLD AUTO: 0.9 % (ref 0–0.5)
LYMPHOCYTES # BLD AUTO: 1.5 10*3/MM3 (ref 0.7–3.1)
LYMPHOCYTES NFR BLD AUTO: 27.9 % (ref 19.6–45.3)
MCH RBC QN AUTO: 28.2 PG (ref 26.6–33)
MCHC RBC AUTO-ENTMCNC: 33 G/DL (ref 31.5–35.7)
MCV RBC AUTO: 85.4 FL (ref 79–97)
MONOCYTES # BLD AUTO: 0.83 10*3/MM3 (ref 0.1–0.9)
MONOCYTES NFR BLD AUTO: 15.4 % (ref 5–12)
NEUTROPHILS NFR BLD AUTO: 2.78 10*3/MM3 (ref 1.7–7)
NEUTROPHILS NFR BLD AUTO: 51.7 % (ref 42.7–76)
NRBC BLD AUTO-RTO: 0 /100 WBC (ref 0–0.2)
PLATELET # BLD AUTO: 286 10*3/MM3 (ref 140–450)
PMV BLD AUTO: 9.7 FL (ref 6–12)
RBC # BLD AUTO: 3.09 10*6/MM3 (ref 3.77–5.28)
WBC # BLD AUTO: 5.38 10*3/MM3 (ref 3.4–10.8)

## 2021-02-28 PROCEDURE — 85025 COMPLETE CBC W/AUTO DIFF WBC: CPT | Performed by: INTERNAL MEDICINE

## 2021-02-28 PROCEDURE — 99232 SBSQ HOSP IP/OBS MODERATE 35: CPT | Performed by: NURSE PRACTITIONER

## 2021-02-28 PROCEDURE — 86140 C-REACTIVE PROTEIN: CPT | Performed by: INTERNAL MEDICINE

## 2021-02-28 PROCEDURE — 63710000001 PREDNISONE PER 5 MG: Performed by: INTERNAL MEDICINE

## 2021-02-28 PROCEDURE — 25010000002 CEFAZOLIN PER 500 MG: Performed by: INTERNAL MEDICINE

## 2021-02-28 PROCEDURE — 25010000002 HYDROMORPHONE 1 MG/ML SOLUTION: Performed by: INTERNAL MEDICINE

## 2021-02-28 PROCEDURE — 72158 MRI LUMBAR SPINE W/O & W/DYE: CPT

## 2021-02-28 PROCEDURE — 25010000002 MORPHINE PER 10 MG: Performed by: INTERNAL MEDICINE

## 2021-02-28 RX ORDER — HYDROCODONE BITARTRATE AND ACETAMINOPHEN 10; 325 MG/1; MG/1
1 TABLET ORAL EVERY 4 HOURS PRN
Status: DISCONTINUED | OUTPATIENT
Start: 2021-02-28 | End: 2021-03-03

## 2021-02-28 RX ORDER — GABAPENTIN 100 MG/1
100 CAPSULE ORAL EVERY 12 HOURS SCHEDULED
Status: DISCONTINUED | OUTPATIENT
Start: 2021-02-28 | End: 2021-03-06 | Stop reason: HOSPADM

## 2021-03-01 LAB
APTT PPP: 34.4 SECONDS (ref 24.1–35)
BASOPHILS # BLD AUTO: 0.05 10*3/MM3 (ref 0–0.2)
BASOPHILS NFR BLD AUTO: 0.8 % (ref 0–1.5)
CRP SERPL-MCNC: 15.13 MG/DL (ref 0–0.5)
DEPRECATED RDW RBC AUTO: 47.8 FL (ref 37–54)
EOSINOPHIL # BLD AUTO: 0.14 10*3/MM3 (ref 0–0.4)
EOSINOPHIL NFR BLD AUTO: 2.1 % (ref 0.3–6.2)
ERYTHROCYTE [DISTWIDTH] IN BLOOD BY AUTOMATED COUNT: 15 % (ref 12.3–15.4)
ERYTHROCYTE [SEDIMENTATION RATE] IN BLOOD: 104 MM/HR (ref 0–20)
FERRITIN SERPL-MCNC: 406.3 NG/ML (ref 13–150)
HCT VFR BLD AUTO: 27.6 % (ref 34–46.6)
HCT VFR BLD AUTO: 27.6 % (ref 34–46.6)
HGB BLD-MCNC: 8.9 G/DL (ref 12–15.9)
IMM GRANULOCYTES # BLD AUTO: 0.04 10*3/MM3 (ref 0–0.05)
IMM GRANULOCYTES NFR BLD AUTO: 0.6 % (ref 0–0.5)
INR PPP: 1.27 (ref 0.91–1.09)
IRON 24H UR-MRATE: 13 MCG/DL (ref 37–145)
IRON SATN MFR SERPL: 8 % (ref 20–50)
LYMPHOCYTES # BLD AUTO: 1.3 10*3/MM3 (ref 0.7–3.1)
LYMPHOCYTES NFR BLD AUTO: 19.9 % (ref 19.6–45.3)
MCH RBC QN AUTO: 28.3 PG (ref 26.6–33)
MCHC RBC AUTO-ENTMCNC: 32.2 G/DL (ref 31.5–35.7)
MCV RBC AUTO: 87.9 FL (ref 79–97)
MONOCYTES # BLD AUTO: 0.92 10*3/MM3 (ref 0.1–0.9)
MONOCYTES NFR BLD AUTO: 14.1 % (ref 5–12)
NEUTROPHILS NFR BLD AUTO: 4.07 10*3/MM3 (ref 1.7–7)
NEUTROPHILS NFR BLD AUTO: 62.5 % (ref 42.7–76)
NRBC BLD AUTO-RTO: 0 /100 WBC (ref 0–0.2)
PA ADP PRP-ACNC: ABNORMAL
PLATELET # BLD AUTO: 251 10*3/MM3 (ref 140–450)
PLATELET # BLD AUTO: 251 10*3/MM3 (ref 140–450)
PMV BLD AUTO: 9.9 FL (ref 6–12)
PROTHROMBIN TIME: 15.5 SECONDS (ref 11.9–14.6)
RBC # BLD AUTO: 3.14 10*6/MM3 (ref 3.77–5.28)
RETICS # AUTO: 0.08 10*6/MM3 (ref 0.02–0.13)
RETICS/RBC NFR AUTO: 2.42 % (ref 0.7–1.9)
TIBC SERPL-MCNC: 165 MCG/DL (ref 298–536)
TRANSFERRIN SERPL-MCNC: 111 MG/DL (ref 200–360)
WBC # BLD AUTO: 6.52 10*3/MM3 (ref 3.4–10.8)

## 2021-03-01 PROCEDURE — 85025 COMPLETE CBC W/AUTO DIFF WBC: CPT | Performed by: INTERNAL MEDICINE

## 2021-03-01 PROCEDURE — 84466 ASSAY OF TRANSFERRIN: CPT | Performed by: INTERNAL MEDICINE

## 2021-03-01 PROCEDURE — 97606 NEG PRS WND THER DME>50 SQCM: CPT | Performed by: PHYSICAL THERAPIST

## 2021-03-01 PROCEDURE — 25010000002 CEFAZOLIN PER 500 MG: Performed by: INTERNAL MEDICINE

## 2021-03-01 PROCEDURE — 87070 CULTURE OTHR SPECIMN AEROBIC: CPT | Performed by: INTERNAL MEDICINE

## 2021-03-01 PROCEDURE — 25010000002 HYDROMORPHONE 1 MG/ML SOLUTION: Performed by: INTERNAL MEDICINE

## 2021-03-01 PROCEDURE — 83540 ASSAY OF IRON: CPT | Performed by: INTERNAL MEDICINE

## 2021-03-01 PROCEDURE — 84134 ASSAY OF PREALBUMIN: CPT | Performed by: INTERNAL MEDICINE

## 2021-03-01 PROCEDURE — 85610 PROTHROMBIN TIME: CPT | Performed by: NEUROLOGICAL SURGERY

## 2021-03-01 PROCEDURE — 86140 C-REACTIVE PROTEIN: CPT | Performed by: INTERNAL MEDICINE

## 2021-03-01 PROCEDURE — 97164 PT RE-EVAL EST PLAN CARE: CPT | Performed by: PHYSICAL THERAPIST

## 2021-03-01 PROCEDURE — 87186 SC STD MICRODIL/AGAR DIL: CPT | Performed by: INTERNAL MEDICINE

## 2021-03-01 PROCEDURE — 85730 THROMBOPLASTIN TIME PARTIAL: CPT | Performed by: NEUROLOGICAL SURGERY

## 2021-03-01 PROCEDURE — 87205 SMEAR GRAM STAIN: CPT | Performed by: INTERNAL MEDICINE

## 2021-03-01 PROCEDURE — 87077 CULTURE AEROBIC IDENTIFY: CPT | Performed by: INTERNAL MEDICINE

## 2021-03-01 PROCEDURE — 85651 RBC SED RATE NONAUTOMATED: CPT | Performed by: INTERNAL MEDICINE

## 2021-03-01 PROCEDURE — 82728 ASSAY OF FERRITIN: CPT | Performed by: INTERNAL MEDICINE

## 2021-03-01 PROCEDURE — 87147 CULTURE TYPE IMMUNOLOGIC: CPT | Performed by: INTERNAL MEDICINE

## 2021-03-01 PROCEDURE — 85045 AUTOMATED RETICULOCYTE COUNT: CPT | Performed by: INTERNAL MEDICINE

## 2021-03-01 PROCEDURE — 99232 SBSQ HOSP IP/OBS MODERATE 35: CPT | Performed by: NURSE PRACTITIONER

## 2021-03-01 PROCEDURE — 82746 ASSAY OF FOLIC ACID SERUM: CPT | Performed by: INTERNAL MEDICINE

## 2021-03-01 PROCEDURE — 63710000001 PREDNISONE PER 5 MG: Performed by: INTERNAL MEDICINE

## 2021-03-01 PROCEDURE — 85576 BLOOD PLATELET AGGREGATION: CPT | Performed by: NEUROLOGICAL SURGERY

## 2021-03-01 PROCEDURE — 82607 VITAMIN B-12: CPT | Performed by: INTERNAL MEDICINE

## 2021-03-01 RX ORDER — CARVEDILOL 25 MG/1
25 TABLET ORAL 2 TIMES DAILY WITH MEALS
Status: DISCONTINUED | OUTPATIENT
Start: 2021-03-01 | End: 2021-03-06 | Stop reason: HOSPADM

## 2021-03-01 RX ORDER — CLONIDINE HYDROCHLORIDE 0.1 MG/1
0.1 TABLET ORAL 2 TIMES DAILY PRN
Status: DISCONTINUED | OUTPATIENT
Start: 2021-03-01 | End: 2021-03-06 | Stop reason: HOSPADM

## 2021-03-01 NOTE — PROGRESS NOTES
ARGELIA Guerrero APRN        Internal Medicine Progress Note    3/1/2021   10:52 CST    Name:  Tawny Shin  MRN:    [id number]     Acct:     [id number]   Room:  [address] [place] [address] Day: 0     Admit Date: 2/12/2021  3:58 PM  PCP: Davon Urrutia MD    Subjective:     C/C: Wound care and rehabilitation efforts.     Interval History: Status: stable. Resting in bed today. No family at bedside. Reports that she did not sleep overnight due to increased inadequately controlled pain. Undergoing wound care at present time. Afebrile. Blood pressure elevated this morning likely related to increased pain. Anticoagulation on hold in anticipation of psoas abscess drainage and plans for OR later this week.   Review of Systems   Constitution: Positive for decreased appetite and malaise/fatigue. Negative for chills and fever.   HENT: Negative for congestion, ear discharge and sore throat.    Eyes: Negative for blurred vision, double vision and pain.   Cardiovascular: Negative for chest pain, claudication and dyspnea on exertion.   Respiratory: Negative for cough, hemoptysis and shortness of breath.    Endocrine: Positive for heat intolerance. Negative for cold intolerance.   Hematologic/Lymphatic: Negative for adenopathy and bleeding problem.   Skin: Positive for poor wound healing. Negative for itching and rash.   Musculoskeletal: Positive for back pain, falls and joint pain.   Gastrointestinal: Positive for constipation. Negative for abdominal pain, diarrhea, nausea and vomiting.   Genitourinary: Negative for bladder incontinence, flank pain and frequency.   Neurological: Positive for weakness. Negative for difficulty with concentration.   Psychiatric/Behavioral: Negative for altered mental status, depression and suicidal ideas.   Allergic/Immunologic: Negative for environmental allergies, HIV exposure and hives.         Medications:     Allergies:   Allergies   Allergen Reactions   •  Atorvastatin Other (See Comments)     LEG CRAMPS     • Amoxicillin Rash   • Escitalopram Rash   • Nabumetone Rash   • Niacin Er Rash   • Penicillins Rash   • Simvastatin Rash       Current Meds:   Current Facility-Administered Medications:   •  acetaminophen (TYLENOL) tablet 650 mg, 650 mg, Oral, Q4H PRN, Beni Urrutia MD  •  ascorbic acid (VITAMIN C) tablet 500 mg, 500 mg, Oral, BID, Beni Urrutia MD  •  [START ON 3/3/2021] aspirin EC tablet 81 mg, 81 mg, Oral, Daily, Beni Urrutia MD  •  bisacodyl (DULCOLAX) suppository 10 mg, 10 mg, Rectal, Daily PRN, Autumn Ramirez APRN  •  budesonide (PULMICORT) nebulizer solution 0.5 mg, 0.5 mg, Nebulization, BID PRN, Beni Urrutia MD  •  carvedilol (COREG) tablet 12.5 mg, 12.5 mg, Oral, BID With Meals, Beni Urrutia MD  •  ceFAZolin in 0.9% normal saline (ANCEF) IVPB solution 2 g, 2 g, Intravenous, Q8H, Beni Urrutia MD  •  [START ON 3/3/2021] clopidogrel (PLAVIX) tablet 75 mg, 75 mg, Oral, Daily, Beni Urrutia MD  •  cyclobenzaprine (FLEXERIL) tablet 10 mg, 10 mg, Oral, TID PRN, Beni Urrutia MD  •  docusate sodium (COLACE) capsule 100 mg, 100 mg, Oral, BID, Francie Golden, APRN  •  folic acid (FOLVITE) tablet 1 mg, 1 mg, Oral, Daily, Beni Urrutia MD  •  gabapentin (NEURONTIN) capsule 100 mg, 100 mg, Oral, Q12H, Beni Urrutia MD  •  guaiFENesin (MUCINEX) 12 hr tablet 600 mg, 600 mg, Oral, Q12H, Beni Urrutia MD  •  HYDROcodone-acetaminophen (NORCO)  MG per tablet 1 tablet, 1 tablet, Oral, Q4H PRN, Beni Urrutia MD  •  HYDROmorphone (DILAUDID) injection 1 mg, 1 mg, Intravenous, Daily PRN, Beni Urrutia MD  •  HYDROmorphone (DILAUDID) injection 1 mg, 1 mg, Intravenous, Q4H PRN, Beni Urrutia MD  •  isosorbide mononitrate (IMDUR) 24 hr tablet 60 mg, 60 mg, Oral, BID - Nitrates, Beni Urrutia MD  •  levothyroxine (SYNTHROID,  "LEVOTHROID) tablet 75 mcg, 75 mcg, Oral, Q AM, Beni Urrutia MD  •  magnesium hydroxide (MILK OF MAGNESIA) suspension 2400 mg/10mL 10 mL, 10 mL, Oral, Daily PRN, Beni Urrutia MD  •  multivitamin with minerals 1 tablet, 1 tablet, Oral, Daily, Beni Urrutia MD  •  nitroglycerin (NITROSTAT) SL tablet 0.4 mg, 0.4 mg, Sublingual, Q5 Min PRN, Beni Urrutia MD  •  pantoprazole (PROTONIX) EC tablet 40 mg, 40 mg, Oral, Q AM, Beni Urrutia MD  •  polyethylene glycol (MIRALAX) packet 17 g, 17 g, Oral, Daily, Francie Golden APRN  •  predniSONE (DELTASONE) tablet 5 mg, 5 mg, Oral, Daily With Breakfast, Beni Urrutia MD  •  rivaroxaban (XARELTO) tablet 10 mg, 10 mg, Oral, Daily With Dinner, Beni Urrutia MD  •  saccharomyces boulardii (FLORASTOR) capsule 250 mg, 250 mg, Oral, BID, Beni Urrutia MD  •  sodium chloride 0.9 % bolus 500 mL, 500 mL, Intravenous, PRN, Beni Urrutia MD  •  traMADol (ULTRAM) tablet 50 mg, 50 mg, Oral, Q6H PRN, Beni Urrutia MD  •  trolamine salicylate (ASPERCREME) 10 % cream, , Topical, Q4H PRN, Beni Urrutia MD  •  valACYclovir (VALTREX) tablet 500 mg, 500 mg, Oral, Q24H, Beni Urrutia MD  •  zinc sulfate (ZINCATE) capsule 220 mg, 220 mg, Oral, Daily, Beni Urrutia MD    Data:     Code Status:    There are no questions and answers to display.       Family History   Problem Relation Age of Onset   • Cancer Mother    • Heart disease Father        Social History     Socioeconomic History   • Marital status:      Spouse name: Not on file   • Number of children: Not on file   • Years of education: Not on file   • Highest education level: Not on file   Tobacco Use   • Smoking status: Never Smoker   • Smokeless tobacco: Never Used   Substance and Sexual Activity   • Alcohol use: No   • Drug use: Never   • Sexual activity: Defer       Vitals:  Ht 170.2 cm (67.01\")   Wt 102 kg (224 " lb)   BMI 35.07 kg/m²   T 98 P 96 R 18 /93 Sp02 95% (room air)          I/O (24Hr):  No intake or output data in the 24 hours ending 03/01/21 1052    Labs and imaging:      Recent Results (from the past 12 hour(s))   Sedimentation Rate    Collection Time: 03/01/21  4:59 AM    Specimen: Blood   Result Value Ref Range    Sed Rate 104 (H) 0 - 20 mm/hr   C-reactive Protein    Collection Time: 03/01/21  4:59 AM    Specimen: Blood   Result Value Ref Range    C-Reactive Protein 15.13 (H) 0.00 - 0.50 mg/dL   CBC Auto Differential    Collection Time: 03/01/21  4:59 AM    Specimen: Blood   Result Value Ref Range    WBC 6.52 3.40 - 10.80 10*3/mm3    RBC 3.14 (L) 3.77 - 5.28 10*6/mm3    Hemoglobin 8.9 (L) 12.0 - 15.9 g/dL    Hematocrit 27.6 (L) 34.0 - 46.6 %    MCV 87.9 79.0 - 97.0 fL    MCH 28.3 26.6 - 33.0 pg    MCHC 32.2 31.5 - 35.7 g/dL    RDW 15.0 12.3 - 15.4 %    RDW-SD 47.8 37.0 - 54.0 fl    MPV 9.9 6.0 - 12.0 fL    Platelets 251 140 - 450 10*3/mm3    Neutrophil % 62.5 42.7 - 76.0 %    Lymphocyte % 19.9 19.6 - 45.3 %    Monocyte % 14.1 (H) 5.0 - 12.0 %    Eosinophil % 2.1 0.3 - 6.2 %    Basophil % 0.8 0.0 - 1.5 %    Immature Grans % 0.6 (H) 0.0 - 0.5 %    Neutrophils, Absolute 4.07 1.70 - 7.00 10*3/mm3    Lymphocytes, Absolute 1.30 0.70 - 3.10 10*3/mm3    Monocytes, Absolute 0.92 (H) 0.10 - 0.90 10*3/mm3    Eosinophils, Absolute 0.14 0.00 - 0.40 10*3/mm3    Basophils, Absolute 0.05 0.00 - 0.20 10*3/mm3    Immature Grans, Absolute 0.04 0.00 - 0.05 10*3/mm3    nRBC 0.0 0.0 - 0.2 /100 WBC       Physical Examination:        Physical Exam  Vitals signs and nursing note reviewed.   Constitutional:       Appearance: Normal appearance.   HENT:      Head: Normocephalic and atraumatic.      Right Ear: External ear normal.      Left Ear: External ear normal.      Nose: Nose normal.      Mouth/Throat:      Mouth: Mucous membranes are moist.      Pharynx: Oropharynx is clear.   Eyes:      Extraocular Movements: Extraocular  movements intact.      Pupils: Pupils are equal, round, and reactive to light.   Neck:      Musculoskeletal: Normal range of motion. Pain with movement present.   Cardiovascular:      Rate and Rhythm: Normal rate and regular rhythm.      Pulses: Normal pulses.      Heart sounds: Normal heart sounds.   Pulmonary:      Effort: Pulmonary effort is normal.      Breath sounds: Normal breath sounds.   Abdominal:      General: Abdomen is flat. Bowel sounds are normal.      Palpations: Abdomen is soft.   Musculoskeletal:      Comments: Generalized weakness  Limited ROM RLE   Skin:     General: Skin is warm and dry.      Capillary Refill: Capillary refill takes less than 2 seconds.      Comments: Wound vac intact   Neurological:      General: No focal deficit present.      Mental Status: She is alert and oriented to person, place, and time.   Psychiatric:         Mood and Affect: Mood normal.         Behavior: Behavior normal.         Thought Content: Thought content normal.           Assessment:             * No active hospital problems. *    Past Medical History:   Diagnosis Date   • Arthritis    • Asthma    • Chronic sinusitis    • Coronary artery disease    • Eustachian tube dysfunction    • Heart disease    • Herpes simplex    • Hyperlipidemia    • Hypertension    • Knee dislocation    • Labral tear of right hip joint    • Laryngitis sicca    • Laryngitis, chronic    • MI (myocardial infarction) (CMS/HCC)    • Otorrhea    • Sensorineural hearing loss    • Sjogren's disease (CMS/HCC)         Plan:        1. MSSA bacteremia  2. Torn gluteus muscle s/p repair  3. Post-operative MSSA wound infection  4. RA  5. Chronic immunosuppression  6. Multifactorial anemia  7. Hypothyroidism  8. GERD  9. Hx CAD  10. Herpes simplex on chronic suppressive therapy  11. HTN  12. Right psoas abscess  13. L1-L2 discitis osteomyelitis  14. Constipation  15. Cervical spinal stenosis    Continue current treatment. Monitor counts. Increase  activity as tolerated. Maintain patient safety. Labs Thursday. Wound care per wound care team. Aggressive therapy as tolerated with activity/weightbearing restrictions per ortho recommendations.  Antibiotics under direction of ID. Continue pain control efforts and avoid oversedation. Continue bowel regimen.  Hold aspirin, plavix and xarelto for possible abscess aspiration early next week. Increase coreg and add prn clonidine.     Electronically signed by DANIELA Hill on 3/1/2021 at 10:52 CST   I have discussed the care of Tawny Shin, including pertinent history and exam findings, with the nurse practitioner.    I have seen and examined the patient and the key elements of all parts of the encounter have been performed by me.  I agree with the assessment, plan and orders as documented by DANIELA Kyle, after I modified the exam findings and the plan of treatments and the final version is my approved version of the assessment.        Electronically signed by Beni Urrutia MD on 3/1/2021 at 21:06 CST

## 2021-03-01 NOTE — PROGRESS NOTES
NEUROSURGERY DAILY PROGRESS NOTE    ASSESSMENT:   Tawny Shin is a 65 y.o. with a significant comorbidity rheumatoid arthritis on disease modifying agents, prior lumbar fusion by Dr. Oropeza in 2018.  She presents with a new problem of postop surgical infection of her right hip with wound VAC dressing, progressive right lower extremity proximal weakness without numbness or radiculopathy. Physical exam findings of right iliopsoas weakness, and global hyperreflexia with Babinski and right Patience's and decreased  strength bilaterally.  Their imaging shows CT of the cervical spine with severe cervical stenosis at severe stenosis C5-6 and C6/7 and adjacent segment disease at L1/2 and L2/3 with concern for osteomyelitis discitis.    Past Medical History:   Diagnosis Date   • Arthritis    • Asthma    • Chronic sinusitis    • Coronary artery disease    • Eustachian tube dysfunction    • Heart disease    • Herpes simplex    • Hyperlipidemia    • Hypertension    • Knee dislocation    • Labral tear of right hip joint    • Laryngitis sicca    • Laryngitis, chronic    • MI (myocardial infarction) (CMS/HCC)    • Otorrhea    • Sensorineural hearing loss    • Sjogren's disease (CMS/HCC)      There are no active hospital problems to display for this patient.    HPI: Appears to be resting comfortably.  Complains of worsening right lower lumbar/posterior hip pain with a new onset of radicular pain to the right groin with numbness and tingling to the anterior thigh.  No acute events overnight.    PLAN:   Neuro: Stable.  Oriented x3.  Gross hyperreflexia, right Babinski's and Mims, 2-3 beats of clonus noted bilaterally, right IP and quadricep weakness 2/5    History of a prior L3-L5 fusion  Adjacent segment disease secondary to above  Right psoas abscess  Right lower extremity weakness     Repeat MRI of the lumbar spine with and without contrast reviewed, appears to show increased her signal change within the L1-2 endplates and  "disc space, increase stenosis at L1-2, psoas abscess appears slightly larger at the L4 level.     Continue neurochecks every 4 hours/per policy   Continue antibiotics per ID   Proceed with IR I&D of right psoas abscess when able.   Analgesics as needed for pain   Neurosurgery will continue to follow   Please call for any new or additional concerns    CHIEF COMPLAINT:   Right-sided lumbar back pain with a new complaint of right anterior thigh pain    Subjective  Symptom stable.       Objective:  General Appearance:  Comfortable, well-appearing and in no acute distress.    Vital signs: (most recent): Height 170.2 cm (67.01\"), weight 102 kg (224 lb), not currently breastfeeding.        Neurologic Exam     Mental Status   Oriented to person, place, and time.   Attention: normal. Concentration: normal.   Speech: speech is normal   Level of consciousness: alert    Bright and awake.  Oriented x3.  Follows commands without prompting showing thumbs up into fingers bilaterally.     Cranial Nerves     CN II   Visual fields full to confrontation.     CN III, IV, VI   Pupils are equal, round, and reactive to light.  Extraocular motions are normal.     CN V   Facial sensation intact.     CN VII   Facial expression full, symmetric.     CN VIII   CN VIII normal.     CN IX, X   CN IX normal.     CN XI   CN XI normal.     Motor Exam     Strength   Right deltoid: 5/5  Left deltoid: 5/5  Right biceps: 5/5  Left biceps: 5/5  Right triceps: 5/5  Left triceps: 5/5  Right wrist extension: 5/5  Left wrist extension: 5/5  Right iliopsoas: 2/5  Left iliopsoas: 5/5  Right quadriceps: 2/5  Left quadriceps: 5/5  Right anterior tibial: 3/5  Left anterior tibial: 5/5  Right gastroc: 4/5  Left gastroc: 5/5       Sensory Exam   Sensory deficit distribution on right: L1    Gait, Coordination, and Reflexes     Tremor   Resting tremor: absent  Intention tremor: absent  Action tremor: absent    Reflexes   Right plantar: upgoing  Left plantar: " normal  Right Mims: present  Left Mism: absent  Right ankle clonus: present  Left ankle clonus: present  Right pendular knee jerk: absent  Left pendular knee jerk: absent    Drains: * No LDAs found *    Imaging Results (Last 24 Hours)     Procedure Component Value Units Date/Time    MRI Lumbar Spine With & Without Contrast [[id number]] Collected: 02/28/21 1334     Updated: 02/28/21 1350    Narrative:      HISTORY: Increasing midline back pain with right lower extremity  radiculopathy. Right psoas muscle abscess     MRI lumbar spine: Multiplanar images lumbar spine performed pre- and  post-IV contrast.     COMPARISON: 2/22/2021 MRI lumbar spine exam     FINDINGS: The alignment of lumbar spine remains appropriate. Conus  medullaris terminates at the L1 level. No abnormal signal or enhancement  within the conus medullaris.     The right psoas abscess is again visualized and is very similar in size  and morphology measuring 2.8 cm in width width, 2.3 cm in AP diameter  and approximately 12.2 cm in craniocaudal dimension extending from the  L1-L5 level.     There are extensive signal changes within and adjacent the L1/L2 disc.  There is enhancing hyperintense T2 signal within the L1/L2 disc  concerning for discitis with possible developing adjacent endplate  osteomyelitis. There is a large L1/L2 central disc protrusion resulting  in severe L1/L2 canal stenosis. No epidural abscess is localized. There  is moderate right neural foramen narrowing at this L1/L2 level secondary  to foraminal disc bulging, mild facet osteoarthropathy, as well as  inflammatory changes of the right paravertebral tissues extending to the  right L1/L2 foramen.     At L2/L3, there is moderate central canal stenosis secondary to mild to  moderate posterior disc bulging and moderate facet osteoarthropathy.  There is mild to moderate right and mild left neural foramen narrowing  at this level.     At L3/L4, there is streak artifact from the  regional hardware. There is  moderate facet osteoarthropathy with mild posterior endplate spurring  suspected. There is mild to moderate canal stenosis with mild effacement  the right lateral recess. Mild to moderate right and mild left neural  foramen narrowing considered.     At L4/L5, there are laminectomy changes. No prominent central canal  stenosis. Mild right neural foramen narrowing considered.     At L5-S1, there is moderate left and mild to moderate right facet  arthropathy. No significant disc bulging. No central canal stenosis or  neural foramen narrowing.       Impression:      1. Similar right psaos abscess and compared to 2/22/2021.  2. Discitis/possible osteomyelitis at the L1/L2 level. No epidural  abscess identified.  3. Severe canal stenosis at the L1/L2 level primarily due to a large  central disc protrusion. Please refer to dictation above for detailed  findings at each level.     This report was finalized on 02/28/2021 13:47 by Dr. Trinity Gómez MD.        Lab Results (last 24 hours)     Procedure Component Value Units Date/Time    C-reactive Protein [[id number]]  (Abnormal) Collected: 03/01/21 0459    Specimen: Blood Updated: 03/01/21 0558     C-Reactive Protein 15.13 mg/dL     Sedimentation Rate [[id number]]  (Abnormal) Collected: 03/01/21 0459    Specimen: Blood Updated: 03/01/21 0540     Sed Rate 104 mm/hr     CBC & Differential [[id number]]  (Abnormal) Collected: 03/01/21 0459    Specimen: Blood Updated: 03/01/21 0517    Narrative:      The following orders were created for panel order CBC & Differential.  Procedure                               Abnormality         Status                     ---------                               -----------         ------                     CBC Auto Differential[[id number]]        Abnormal            Final result                 Please view results for these tests on the individual orders.    CBC Auto Differential [[id number]]  (Abnormal) Collected:  03/01/21 0459    Specimen: Blood Updated: 03/01/21 0517     WBC 6.52 10*3/mm3      RBC 3.14 10*6/mm3      Hemoglobin 8.9 g/dL      Hematocrit 27.6 %      MCV 87.9 fL      MCH 28.3 pg      MCHC 32.2 g/dL      RDW 15.0 %      RDW-SD 47.8 fl      MPV 9.9 fL      Platelets 251 10*3/mm3      Neutrophil % 62.5 %      Lymphocyte % 19.9 %      Monocyte % 14.1 %      Eosinophil % 2.1 %      Basophil % 0.8 %      Immature Grans % 0.6 %      Neutrophils, Absolute 4.07 10*3/mm3      Lymphocytes, Absolute 1.30 10*3/mm3      Monocytes, Absolute 0.92 10*3/mm3      Eosinophils, Absolute 0.14 10*3/mm3      Basophils, Absolute 0.05 10*3/mm3      Immature Grans, Absolute 0.04 10*3/mm3      nRBC 0.0 /100 WBC         Taiwo Prado, APRN

## 2021-03-01 NOTE — PROGRESS NOTES
Infectious Diseases Progress Note    Patient:  Tawny Shin  YOB: 1955  MRN: [id number]   Admit date: 2/12/2021   Admitting Physician: Beni Urrutia MD  Primary Care Physician: Davon Urrutia MD    Chief Complaint/Interval History: She did not sleep well last night.  Interval notes reviewed.  Discussed with Dr. Shea.  He is planning cervical spine decompression via anterior approach on Wednesday morning.  He will then do a posterior approach to help provide any decompression for lumbar spine.  Hopefully he can coordinate interventional radiology drainage of psoas abscess preoperatively.    No intake or output data in the 24 hours ending 03/01/21 1224  Allergies:   Allergies   Allergen Reactions   • Atorvastatin Other (See Comments)     LEG CRAMPS     • Amoxicillin Rash   • Escitalopram Rash   • Nabumetone Rash   • Niacin Er Rash   • Penicillins Rash   • Simvastatin Rash     Current Scheduled Medications:   ascorbic acid, 500 mg, Oral, BID  carvedilol, 25 mg, Oral, BID With Meals  ceFAZolin, 2 g, Intravenous, Q8H  docusate sodium, 100 mg, Oral, BID  folic acid, 1 mg, Oral, Daily  gabapentin, 100 mg, Oral, Q12H  guaiFENesin, 600 mg, Oral, Q12H  isosorbide mononitrate, 60 mg, Oral, BID - Nitrates  levothyroxine, 75 mcg, Oral, Q AM  multivitamin with minerals, 1 tablet, Oral, Daily  pantoprazole, 40 mg, Oral, Q AM  polyethylene glycol, 17 g, Oral, Daily  predniSONE, 5 mg, Oral, Daily With Breakfast  saccharomyces boulardii, 250 mg, Oral, BID  valACYclovir, 500 mg, Oral, Q24H  zinc sulfate, 220 mg, Oral, Daily      Current PRN Medications:  •  acetaminophen  •  bisacodyl  •  budesonide  •  cloNIDine  •  cyclobenzaprine  •  HYDROcodone-acetaminophen  •  HYDROmorphone  •  HYDROmorphone  •  magnesium hydroxide  •  nitroglycerin  •  sodium chloride  •  traMADol  •  trolamine salicylate    Review of Systems See HPI.  No cardiopulmonary symptoms.  No nausea, diarrhea, or rash.    Vital  "Signs:  Ht 170.2 cm (67.01\")   Wt 102 kg (224 lb)   BMI 35.07 kg/m²     Physical Exam  Vital signs - reviewed.  Line/IV site - No erythema, warmth, induration, or tenderness.  Lungs without crackles  Abdomen soft nontender  Interval neuro exam per neurosurgery reviewed    Lab Results:  CBC:   Results from last 7 days   Lab Units 03/01/21  0459 02/28/21  0430 02/27/21  0437 02/26/21  0346 02/25/21  0439 02/23/21  0453   WBC 10*3/mm3 6.52 5.38 6.36 6.39 6.68 6.87   HEMOGLOBIN g/dL 8.9* 8.7* 7.8* 8.8* 8.7* 9.1*   HEMATOCRIT % 27.6* 26.4* 24.5* 27.7* 27.2* 27.8*   PLATELETS 10*3/mm3 251 286 272 299 321 300     BMP:  Results from last 7 days   Lab Units 02/26/21  0346 02/25/21  0502 02/23/21  0453   SODIUM mmol/L 136 137 137   POTASSIUM mmol/L 4.1 3.8 4.0   CHLORIDE mmol/L 100 100 99   CO2 mmol/L 29.0 29.0 29.0   BUN mg/dL 9 9 7*   CREATININE mg/dL 0.46* 0.44* 0.44*   GLUCOSE mg/dL 94 124* 128*   CALCIUM mg/dL 8.9 8.8 8.9   ALT (SGPT) U/L <5  --  7     Culture Results: No results found for: BLOODCX, URINECX, WOUNDCX, MRSACX, RESPCX, STOOLCX  Radiology: None  Additional Studies Reviewed: None    Impression:   1.  Methicillin susceptible staph aureus bacteremia  2.  Postoperative infection right hip following gluteus repair  3.  Psoas abscess  4.  Probable discitis/osteomyelitis lumbar area  5.  Cervical stenosis  6.  Rheumatoid arthritis    Recommendations:   Continue off Plavix  Continue cefazolin  Agree with surgery on Wednesday per Dr. hSea  Agree with interventional radiology drainage of psoas abscess  Continue supportive care  Would like additional culture results sent from psoas abscess as outlined below    If psoas abscess successfully drained in interventional radiology would send as much fluid as possible and Syringe to microbiology for the following in order of priority:  1.  Aerobic anaerobic culture  2.  Fungal culture and susceptibility  3.  AFB culture  4.  Gram stain, AFB stain, and LESA      Elie B. " MD Mayda

## 2021-03-02 ENCOUNTER — APPOINTMENT (OUTPATIENT)
Dept: CT IMAGING | Facility: HOSPITAL | Age: 66
End: 2021-03-02

## 2021-03-02 ENCOUNTER — ANESTHESIA EVENT (OUTPATIENT)
Dept: PERIOP | Facility: HOSPITAL | Age: 66
End: 2021-03-02

## 2021-03-02 ENCOUNTER — PREP FOR SURGERY (OUTPATIENT)
Dept: OTHER | Facility: HOSPITAL | Age: 66
End: 2021-03-02

## 2021-03-02 DIAGNOSIS — M46.26 OSTEOMYELITIS OF LUMBAR SPINE (HCC): ICD-10-CM

## 2021-03-02 DIAGNOSIS — M48.062 SPINAL STENOSIS, LUMBAR REGION, WITH NEUROGENIC CLAUDICATION: ICD-10-CM

## 2021-03-02 DIAGNOSIS — M46.46 DISCITIS OF LUMBAR REGION: ICD-10-CM

## 2021-03-02 DIAGNOSIS — G99.2 STENOSIS OF CERVICAL SPINE WITH MYELOPATHY (HCC): Primary | ICD-10-CM

## 2021-03-02 DIAGNOSIS — M48.02 STENOSIS OF CERVICAL SPINE WITH MYELOPATHY (HCC): Primary | ICD-10-CM

## 2021-03-02 LAB
ABO GROUP BLD: NORMAL
BASOPHILS # BLD AUTO: 0.05 10*3/MM3 (ref 0–0.2)
BASOPHILS NFR BLD AUTO: 0.8 % (ref 0–1.5)
BLD GP AB SCN SERPL QL: NEGATIVE
CRP SERPL-MCNC: 17.13 MG/DL (ref 0–0.5)
DEPRECATED RDW RBC AUTO: 48.5 FL (ref 37–54)
EOSINOPHIL # BLD AUTO: 0.15 10*3/MM3 (ref 0–0.4)
EOSINOPHIL NFR BLD AUTO: 2.5 % (ref 0.3–6.2)
ERYTHROCYTE [DISTWIDTH] IN BLOOD BY AUTOMATED COUNT: 15 % (ref 12.3–15.4)
FOLATE SERPL-MCNC: >20 NG/ML (ref 4.78–24.2)
HCT VFR BLD AUTO: 24.8 % (ref 34–46.6)
HGB BLD-MCNC: 7.8 G/DL (ref 12–15.9)
IMM GRANULOCYTES # BLD AUTO: 0.04 10*3/MM3 (ref 0–0.05)
IMM GRANULOCYTES NFR BLD AUTO: 0.7 % (ref 0–0.5)
KOH PREP NAIL: NORMAL
LYMPHOCYTES # BLD AUTO: 1.99 10*3/MM3 (ref 0.7–3.1)
LYMPHOCYTES NFR BLD AUTO: 32.6 % (ref 19.6–45.3)
MCH RBC QN AUTO: 27.7 PG (ref 26.6–33)
MCHC RBC AUTO-ENTMCNC: 31.5 G/DL (ref 31.5–35.7)
MCV RBC AUTO: 87.9 FL (ref 79–97)
MONOCYTES # BLD AUTO: 0.87 10*3/MM3 (ref 0.1–0.9)
MONOCYTES NFR BLD AUTO: 14.3 % (ref 5–12)
NEUTROPHILS NFR BLD AUTO: 3 10*3/MM3 (ref 1.7–7)
NEUTROPHILS NFR BLD AUTO: 49.1 % (ref 42.7–76)
NRBC BLD AUTO-RTO: 0 /100 WBC (ref 0–0.2)
PLATELET # BLD AUTO: 223 10*3/MM3 (ref 140–450)
PMV BLD AUTO: 9.7 FL (ref 6–12)
PREALB SERPL-MCNC: 10.3 MG/DL (ref 20–40)
RBC # BLD AUTO: 2.82 10*6/MM3 (ref 3.77–5.28)
RH BLD: POSITIVE
SARS-COV-2 RDRP RESP QL NAA+PROBE: NORMAL
T&S EXPIRATION DATE: NORMAL
VIT B12 BLD-MCNC: 470 PG/ML (ref 211–946)
WBC # BLD AUTO: 6.1 10*3/MM3 (ref 3.4–10.8)

## 2021-03-02 PROCEDURE — 87147 CULTURE TYPE IMMUNOLOGIC: CPT | Performed by: INTERNAL MEDICINE

## 2021-03-02 PROCEDURE — 86927 PLASMA FRESH FROZEN: CPT

## 2021-03-02 PROCEDURE — 97530 THERAPEUTIC ACTIVITIES: CPT

## 2021-03-02 PROCEDURE — 87070 CULTURE OTHR SPECIMN AEROBIC: CPT | Performed by: INTERNAL MEDICINE

## 2021-03-02 PROCEDURE — 25010000002 HYDROMORPHONE 1 MG/ML SOLUTION: Performed by: INTERNAL MEDICINE

## 2021-03-02 PROCEDURE — P9017 PLASMA 1 DONOR FRZ W/IN 8 HR: HCPCS

## 2021-03-02 PROCEDURE — 36430 TRANSFUSION BLD/BLD COMPNT: CPT

## 2021-03-02 PROCEDURE — 87206 SMEAR FLUORESCENT/ACID STAI: CPT | Performed by: INTERNAL MEDICINE

## 2021-03-02 PROCEDURE — 87205 SMEAR GRAM STAIN: CPT | Performed by: INTERNAL MEDICINE

## 2021-03-02 PROCEDURE — 63710000001 PREDNISONE PER 5 MG: Performed by: INTERNAL MEDICINE

## 2021-03-02 PROCEDURE — 87635 SARS-COV-2 COVID-19 AMP PRB: CPT | Performed by: NEUROLOGICAL SURGERY

## 2021-03-02 PROCEDURE — 87186 SC STD MICRODIL/AGAR DIL: CPT | Performed by: INTERNAL MEDICINE

## 2021-03-02 PROCEDURE — 99232 SBSQ HOSP IP/OBS MODERATE 35: CPT | Performed by: NURSE PRACTITIONER

## 2021-03-02 PROCEDURE — 86901 BLOOD TYPING SEROLOGIC RH(D): CPT | Performed by: NEUROLOGICAL SURGERY

## 2021-03-02 PROCEDURE — 87116 MYCOBACTERIA CULTURE: CPT | Performed by: INTERNAL MEDICINE

## 2021-03-02 PROCEDURE — 97110 THERAPEUTIC EXERCISES: CPT

## 2021-03-02 PROCEDURE — 87102 FUNGUS ISOLATION CULTURE: CPT | Performed by: INTERNAL MEDICINE

## 2021-03-02 PROCEDURE — 25010000002 CEFAZOLIN PER 500 MG: Performed by: INTERNAL MEDICINE

## 2021-03-02 PROCEDURE — 0K9N3ZX DRAINAGE OF RIGHT HIP MUSCLE, PERCUTANEOUS APPROACH, DIAGNOSTIC: ICD-10-PCS | Performed by: RADIOLOGY

## 2021-03-02 PROCEDURE — 86900 BLOOD TYPING SEROLOGIC ABO: CPT | Performed by: NEUROLOGICAL SURGERY

## 2021-03-02 PROCEDURE — 86850 RBC ANTIBODY SCREEN: CPT | Performed by: NEUROLOGICAL SURGERY

## 2021-03-02 PROCEDURE — 87075 CULTR BACTERIA EXCEPT BLOOD: CPT | Performed by: INTERNAL MEDICINE

## 2021-03-02 PROCEDURE — 85025 COMPLETE CBC W/AUTO DIFF WBC: CPT | Performed by: INTERNAL MEDICINE

## 2021-03-02 PROCEDURE — 77012 CT SCAN FOR NEEDLE BIOPSY: CPT

## 2021-03-02 PROCEDURE — 86140 C-REACTIVE PROTEIN: CPT | Performed by: INTERNAL MEDICINE

## 2021-03-02 PROCEDURE — 87220 TISSUE EXAM FOR FUNGI: CPT | Performed by: INTERNAL MEDICINE

## 2021-03-02 RX ORDER — BUPIVACAINE HCL/0.9 % NACL/PF 0.1 %
2 PLASTIC BAG, INJECTION (ML) EPIDURAL ONCE
Status: CANCELLED | OUTPATIENT
Start: 2021-03-02 | End: 2021-03-02

## 2021-03-02 NOTE — PROGRESS NOTES
"LTACH Fall Assessment Note    Tawny Shin is a 65 y.o.female  [Ht: 170.2 cm (67.01\"); Wt: 102 kg (224 lb)] admitted 2/12/2021  3:58 PM.    Current medications associated with an increased risk for fall include:  Carvedilol  Gabapentin  Isosorbide mononitrate  Clonidine  Cyclobenzaprine  Norco  Hydromorphone  Nitroglycerin  Tramadol    KARUNA Bee [facility]  03/02/2113:37 CST         "

## 2021-03-02 NOTE — PROGRESS NOTES
NEUROSURGERY DAILY PROGRESS NOTE    ASSESSMENT:   Tawny Shin is a 65 y.o. with a significant comorbidity rheumatoid arthritis on disease modifying agents, prior lumbar fusion by Dr. Oropeza in 2018.  She presents with a new problem of postop surgical infection of her right hip with wound VAC dressing, progressive right lower extremity proximal weakness without numbness or radiculopathy. Physical exam findings of right iliopsoas weakness, and global hyperreflexia with Babinski and right Patience's and decreased  strength bilaterally.  Their imaging shows CT of the cervical spine with severe cervical stenosis at severe stenosis C5-6 and C6/7 and adjacent segment disease at L1/2 and L2/3 with concern for osteomyelitis discitis.    Past Medical History:   Diagnosis Date   • Arthritis    • Asthma    • Chronic sinusitis    • Coronary artery disease    • Eustachian tube dysfunction    • Heart disease    • Herpes simplex    • Hyperlipidemia    • Hypertension    • Knee dislocation    • Labral tear of right hip joint    • Laryngitis sicca    • Laryngitis, chronic    • MI (myocardial infarction) (CMS/HCC)    • Otorrhea    • Sensorineural hearing loss    • Sjogren's disease (CMS/HCC)      There are no active hospital problems to display for this patient.    HPI: Appears to be resting comfortably.  Continues to complain of right lower lumbar/posterior hip and right groin pain.   No acute events overnight.    PLAN:   Neuro: Stable.  Oriented x3.  Gross hyperreflexia, right Babinski's and Mims, 2-3 beats of clonus noted bilaterally, right IP and quadricep weakness 2/5    History of a prior L3-L5 fusion  Adjacent segment disease secondary to above  Right psoas abscess  Discitis/osteomyelitis L1 to  L1/2 severe central canal stenosis  Right lower extremity weakness     Continue neurochecks every 4 hours   MRI cervical spine shows severe cervical stenosis at severe stenosis C5-6 and C6/7.   Repeat MRI of the lumbar spine with  "and without contrast reviewed, appears to show increased her signal change within the L1-2 endplates and disc space, increase stenosis at L1-2, psoas abscess appears slightly larger at the L4 level.      Plan for I&D/drain right psoas abscess per IR tomorrow   Plan for C6 corpectomy with titanium cage followed by an open L1 laminectomy with possible discectomy, foraminotomy, and L1 debridement tomorrow with neuro monitoring    CV: No acute issues.   Pulm: No acute issues.  Maintaining O2 sat.   : No acute issues  FEN: Regular diet.  NPO after midnight  GI: Prophylaxis  ID: MSSA bacteria.  Continue cefazolin per ID.  Covid swab today.  Heme:  DVT prophylaxis held for operative procedure; SCD's   H/H7.8/24.8.  PT/INR 20.7/1.81   Plan for type and cross for 2 units of FFP; repeat PT/INR in the a.m.  Pain: Tolerable at present  Dispo: PT/OT.       CHIEF COMPLAINT:   Right-sided lumbar back pain with a new complaint of right anterior thigh pain    Subjective  Symptom stable.       Objective:  General Appearance:  Comfortable, well-appearing and in no acute distress.    Vital signs: (most recent): Height 170.2 cm (67.01\"), weight 102 kg (224 lb), not currently breastfeeding.      Neurologic Exam     Mental Status   Oriented to person, place, and time.   Attention: normal. Concentration: normal.   Speech: speech is normal   Level of consciousness: alert    Bright and awake.  Oriented x3.  Follows commands without prompting showing thumbs up into fingers bilaterally.     Cranial Nerves     CN II   Visual fields full to confrontation.     CN III, IV, VI   Pupils are equal, round, and reactive to light.  Extraocular motions are normal.     CN V   Facial sensation intact.     CN VII   Facial expression full, symmetric.     CN VIII   CN VIII normal.     CN IX, X   CN IX normal.     CN XI   CN XI normal.     Motor Exam     Strength   Right deltoid: 5/5  Left deltoid: 5/5  Right biceps: 5/5  Left biceps: 5/5  Right triceps: " 5/5  Left triceps: 5/5  Right wrist extension: 5/5  Left wrist extension: 5/5  Right iliopsoas: 2/5  Left iliopsoas: 5/5  Right quadriceps: 2/5  Left quadriceps: 5/5  Right anterior tibial: 3/5  Left anterior tibial: 5/5  Right gastroc: 4/5  Left gastroc: 5/5       Sensory Exam   Sensory deficit distribution on right: L1    Gait, Coordination, and Reflexes     Tremor   Resting tremor: absent  Intention tremor: absent  Action tremor: absent    Reflexes   Right plantar: upgoing  Left plantar: normal  Right Mims: present  Left Mims: absent  Right ankle clonus: present  Left ankle clonus: present  Right pendular knee jerk: absent  Left pendular knee jerk: absent    Drains: * No LDAs found *    Imaging Results (Last 24 Hours)     ** No results found for the last 24 hours. **        Lab Results (last 24 hours)     Procedure Component Value Units Date/Time    Wound Culture - Wound, Hip, Right [[id number]]  (Abnormal) Collected: 03/01/21 1001    Specimen: Wound from Hip, Right Updated: 03/02/21 0700     Wound Culture Scant growth (1+) Mixed Gram Negative Selina     Gram Stain Few (2+) WBCs seen      No organisms seen    Narrative:      Organism under investigation.      C-reactive Protein [[id number]]  (Abnormal) Collected: 03/02/21 0331    Specimen: Blood Updated: 03/02/21 0409     C-Reactive Protein 17.13 mg/dL     CBC & Differential [[id number]]  (Abnormal) Collected: 03/02/21 0331    Specimen: Blood Updated: 03/02/21 0342    Narrative:      The following orders were created for panel order CBC & Differential.  Procedure                               Abnormality         Status                     ---------                               -----------         ------                     CBC Auto Differential[[id number]]        Abnormal            Final result                 Please view results for these tests on the individual orders.    CBC Auto Differential [[id number]]  (Abnormal) Collected: 03/02/21 0331    Specimen:  Blood Updated: 03/02/21 0342     WBC 6.10 10*3/mm3      RBC 2.82 10*6/mm3      Hemoglobin 7.8 g/dL      Hematocrit 24.8 %      MCV 87.9 fL      MCH 27.7 pg      MCHC 31.5 g/dL      RDW 15.0 %      RDW-SD 48.5 fl      MPV 9.7 fL      Platelets 223 10*3/mm3      Neutrophil % 49.1 %      Lymphocyte % 32.6 %      Monocyte % 14.3 %      Eosinophil % 2.5 %      Basophil % 0.8 %      Immature Grans % 0.7 %      Neutrophils, Absolute 3.00 10*3/mm3      Lymphocytes, Absolute 1.99 10*3/mm3      Monocytes, Absolute 0.87 10*3/mm3      Eosinophils, Absolute 0.15 10*3/mm3      Basophils, Absolute 0.05 10*3/mm3      Immature Grans, Absolute 0.04 10*3/mm3      nRBC 0.0 /100 WBC     Vitamin B12 [[id number]]  (Normal) Collected: 03/01/21 1806    Specimen: Blood Updated: 03/02/21 0207     Vitamin B-12 470 pg/mL     Narrative:      Results may be falsely increased if patient taking Biotin.      Folate [[id number]]  (Normal) Collected: 03/01/21 1806    Specimen: Blood Updated: 03/02/21 0207     Folate >20.00 ng/mL     Narrative:      Results may be falsely increased if patient taking Biotin.      Prealbumin [[id number]]  (Abnormal) Collected: 03/01/21 1806    Specimen: Blood Updated: 03/02/21 0202     Prealbumin 10.3 mg/dL     Iron Profile [[id number]]  (Abnormal) Collected: 03/01/21 1806    Specimen: Blood Updated: 03/01/21 1915     Iron 13 mcg/dL      Iron Saturation 8 %      Transferrin 111 mg/dL      TIBC 165 mcg/dL     Ferritin [[id number]]  (Abnormal) Collected: 03/01/21 1806    Specimen: Blood Updated: 03/01/21 1915     Ferritin 406.30 ng/mL     Narrative:      Results may be falsely decreased if patient taking Biotin.      Reticulocytes [[id number]]  (Abnormal) Collected: 03/01/21 1806    Specimen: Blood Updated: 03/01/21 1843     Reticulocyte % 2.42 %      Reticulocyte Absolute 0.0794 10*6/mm3     P2Y12 Platelet Inhibition [[id number]]  (Abnormal) Collected: 03/01/21 1250    Specimen: Blood Updated: 03/01/21 2358     Hematocrit 27.6 %       Platelets 251 10*3/mm3      P2Y12 Reactivity Unit --     Comment: Testing performed at [facility].  See attached scanned report.       Narrative:      PRU reference range 194-418 is for patients not receiving P2Y12 drug. Post Drug results: Lower PRU levels are associated with expected antiplatelet effect. Values may be below the stated reference range above. The post-drug PRU values reported are .          Protime-INR [[id number]]  (Abnormal) Collected: 03/01/21 1244    Specimen: Blood Updated: 03/01/21 1310     Protime 15.5 Seconds      INR 1.27    aPTT [[id number]]  (Normal) Collected: 03/01/21 1244    Specimen: Blood Updated: 03/01/21 1310     PTT 34.4 seconds         aTiwo Prado, APRN

## 2021-03-02 NOTE — PROGRESS NOTES
ARGELIA Guerrero APRN        Internal Medicine Progress Note    3/2/2021   11:41 CST    Name:  Tawny Shin  MRN:    [id number]     Acct:     [id number]   Room:  [address] [place] [address] Day: 0     Admit Date: 2/12/2021  3:58 PM  PCP: Davon Urrutia MD    Subjective:     C/C: Wound care and rehabilitation efforts.     Interval History: Status: stable. Resting in bed. No family at bedside. Afebrile. Still with increased generalized pain - worse with movement. Plans for OR tomorrow - antiplatelet therapy and anticoagulation has been on hold in anticipation. Blood pressure with slightly better control with medication adjustment. Slight drop in hemoglobin.   Review of Systems   Constitution: Positive for decreased appetite and malaise/fatigue. Negative for chills and fever.   HENT: Negative for congestion, ear discharge and sore throat.    Eyes: Negative for blurred vision, double vision and pain.   Cardiovascular: Negative for chest pain, claudication and dyspnea on exertion.   Respiratory: Negative for cough, hemoptysis and shortness of breath.    Endocrine: Positive for heat intolerance. Negative for cold intolerance.   Hematologic/Lymphatic: Negative for adenopathy and bleeding problem.   Skin: Positive for poor wound healing. Negative for itching and rash.   Musculoskeletal: Positive for back pain, falls and joint pain.   Gastrointestinal: Positive for constipation. Negative for abdominal pain, diarrhea, nausea and vomiting.   Genitourinary: Negative for bladder incontinence, flank pain and frequency.   Neurological: Positive for weakness. Negative for difficulty with concentration.   Psychiatric/Behavioral: Negative for altered mental status, depression and suicidal ideas.   Allergic/Immunologic: Negative for environmental allergies, HIV exposure and hives.         Medications:     Allergies:   Allergies   Allergen Reactions   • Atorvastatin Other (See Comments)     LEG CRAMPS     •  Amoxicillin Rash   • Escitalopram Rash   • Nabumetone Rash   • Niacin Er Rash   • Penicillins Rash   • Simvastatin Rash       Current Meds:   Current Facility-Administered Medications:   •  acetaminophen (TYLENOL) tablet 650 mg, 650 mg, Oral, Q4H PRN, Beni Urrutia MD  •  ascorbic acid (VITAMIN C) tablet 500 mg, 500 mg, Oral, BID, Beni Urrutia MD  •  bisacodyl (DULCOLAX) suppository 10 mg, 10 mg, Rectal, Daily PRN, Autumn Ramirez, APRN  •  budesonide (PULMICORT) nebulizer solution 0.5 mg, 0.5 mg, Nebulization, BID PRN, Beni Urrutia MD  •  carvedilol (COREG) tablet 25 mg, 25 mg, Oral, BID With Meals, Beni Urrutia MD  •  ceFAZolin in 0.9% normal saline (ANCEF) IVPB solution 2 g, 2 g, Intravenous, Q8H, Beni Urrutia MD  •  cloNIDine (CATAPRES) tablet 0.1 mg, 0.1 mg, Oral, BID PRN, Beni Urrutia MD  •  cyclobenzaprine (FLEXERIL) tablet 10 mg, 10 mg, Oral, TID PRN, Beni Urrutia MD  •  docusate sodium (COLACE) capsule 100 mg, 100 mg, Oral, BID, Francie Golden, APRN  •  folic acid (FOLVITE) tablet 1 mg, 1 mg, Oral, Daily, Beni Urrutia MD  •  gabapentin (NEURONTIN) capsule 100 mg, 100 mg, Oral, Q12H, Beni Urrutia MD  •  guaiFENesin (MUCINEX) 12 hr tablet 600 mg, 600 mg, Oral, Q12H, Beni Urruita MD  •  HYDROcodone-acetaminophen (NORCO)  MG per tablet 1 tablet, 1 tablet, Oral, Q4H PRN, Beni Urrutia MD  •  HYDROmorphone (DILAUDID) injection 1 mg, 1 mg, Intravenous, Daily PRN, Beni Urrutia MD  •  HYDROmorphone (DILAUDID) injection 1 mg, 1 mg, Intravenous, Q4H PRN, Beni Urrutia MD  •  isosorbide mononitrate (IMDUR) 24 hr tablet 60 mg, 60 mg, Oral, BID - Nitrates, Beni Urrutia MD  •  levothyroxine (SYNTHROID, LEVOTHROID) tablet 75 mcg, 75 mcg, Oral, Q AM, Beni Urrutia MD  •  magnesium hydroxide (MILK OF MAGNESIA) suspension 2400 mg/10mL 10 mL, 10 mL, Oral, Daily PRN,  "Beni Urrutia MD  •  multivitamin with minerals 1 tablet, 1 tablet, Oral, Daily, Beni Urrutia MD  •  nitroglycerin (NITROSTAT) SL tablet 0.4 mg, 0.4 mg, Sublingual, Q5 Min PRN, Beni Urrutia MD  •  pantoprazole (PROTONIX) EC tablet 40 mg, 40 mg, Oral, Q AM, Beni Urrutia MD  •  polyethylene glycol (MIRALAX) packet 17 g, 17 g, Oral, Daily, Francie Golden, DANIELA  •  predniSONE (DELTASONE) tablet 5 mg, 5 mg, Oral, Daily With Breakfast, Beni Urrutia MD  •  saccharomyces boulardii (FLORASTOR) capsule 250 mg, 250 mg, Oral, BID, Beni Urrutia MD  •  sodium chloride 0.9 % bolus 500 mL, 500 mL, Intravenous, PRN, Beni Urrutia MD  •  traMADol (ULTRAM) tablet 50 mg, 50 mg, Oral, Q6H PRN, Beni Urrutia MD  •  trolamine salicylate (ASPERCREME) 10 % cream, , Topical, Q4H PRN, Beni Urrutia MD  •  valACYclovir (VALTREX) tablet 500 mg, 500 mg, Oral, Q24H, Beni Urrutia MD  •  zinc sulfate (ZINCATE) capsule 220 mg, 220 mg, Oral, Daily, Beni Urrutia MD    Data:     Code Status:    There are no questions and answers to display.       Family History   Problem Relation Age of Onset   • Cancer Mother    • Heart disease Father        Social History     Socioeconomic History   • Marital status:      Spouse name: Not on file   • Number of children: Not on file   • Years of education: Not on file   • Highest education level: Not on file   Tobacco Use   • Smoking status: Never Smoker   • Smokeless tobacco: Never Used   Substance and Sexual Activity   • Alcohol use: No   • Drug use: Never   • Sexual activity: Defer       Vitals:  Ht 170.2 cm (67.01\")   Wt 102 kg (224 lb)   BMI 35.07 kg/m²   T 98.1 P 84 R 18 /71 Sp02 95% (room air)          I/O (24Hr):  No intake or output data in the 24 hours ending 03/02/21 1141    Labs and imaging:      Recent Results (from the past 12 hour(s))   C-reactive Protein    Collection Time: " 03/02/21  3:31 AM    Specimen: Blood   Result Value Ref Range    C-Reactive Protein 17.13 (H) 0.00 - 0.50 mg/dL   CBC Auto Differential    Collection Time: 03/02/21  3:31 AM    Specimen: Blood   Result Value Ref Range    WBC 6.10 3.40 - 10.80 10*3/mm3    RBC 2.82 (L) 3.77 - 5.28 10*6/mm3    Hemoglobin 7.8 (L) 12.0 - 15.9 g/dL    Hematocrit 24.8 (L) 34.0 - 46.6 %    MCV 87.9 79.0 - 97.0 fL    MCH 27.7 26.6 - 33.0 pg    MCHC 31.5 31.5 - 35.7 g/dL    RDW 15.0 12.3 - 15.4 %    RDW-SD 48.5 37.0 - 54.0 fl    MPV 9.7 6.0 - 12.0 fL    Platelets 223 140 - 450 10*3/mm3    Neutrophil % 49.1 42.7 - 76.0 %    Lymphocyte % 32.6 19.6 - 45.3 %    Monocyte % 14.3 (H) 5.0 - 12.0 %    Eosinophil % 2.5 0.3 - 6.2 %    Basophil % 0.8 0.0 - 1.5 %    Immature Grans % 0.7 (H) 0.0 - 0.5 %    Neutrophils, Absolute 3.00 1.70 - 7.00 10*3/mm3    Lymphocytes, Absolute 1.99 0.70 - 3.10 10*3/mm3    Monocytes, Absolute 0.87 0.10 - 0.90 10*3/mm3    Eosinophils, Absolute 0.15 0.00 - 0.40 10*3/mm3    Basophils, Absolute 0.05 0.00 - 0.20 10*3/mm3    Immature Grans, Absolute 0.04 0.00 - 0.05 10*3/mm3    nRBC 0.0 0.0 - 0.2 /100 WBC   COVID-19, [facility] IN-HOUSE,NASAL Swab (NO TRANSPORT MEDIA) 2 HR TAT - Swab, Nasopharynx    Collection Time: 03/02/21 11:00 AM    Specimen: Nasopharynx; Swab   Result Value Ref Range    COVID19 Presumptive Negative Presumptive Negative - Ref. Range       Physical Examination:        Physical Exam  Vitals signs and nursing note reviewed.   Constitutional:       Appearance: Normal appearance.   HENT:      Head: Normocephalic and atraumatic.      Right Ear: External ear normal.      Left Ear: External ear normal.      Nose: Nose normal.      Mouth/Throat:      Mouth: Mucous membranes are moist.      Pharynx: Oropharynx is clear.   Eyes:      Extraocular Movements: Extraocular movements intact.      Pupils: Pupils are equal, round, and reactive to light.   Neck:      Musculoskeletal: Normal range of motion. Pain with movement  present.   Cardiovascular:      Rate and Rhythm: Normal rate and regular rhythm.      Pulses: Normal pulses.      Heart sounds: Normal heart sounds.   Pulmonary:      Effort: Pulmonary effort is normal.      Breath sounds: Normal breath sounds.   Abdominal:      General: Abdomen is flat. Bowel sounds are normal.      Palpations: Abdomen is soft.   Musculoskeletal:      Comments: Generalized weakness  Limited ROM RLE   Skin:     General: Skin is warm and dry.      Capillary Refill: Capillary refill takes less than 2 seconds.      Comments: Wound vac intact   Neurological:      General: No focal deficit present.      Mental Status: She is alert and oriented to person, place, and time.   Psychiatric:         Mood and Affect: Mood normal.         Behavior: Behavior normal.         Thought Content: Thought content normal.           Assessment:             Stenosis of cervical spine with myelopathy (CMS/HCC)    Spinal stenosis, lumbar region, with neurogenic claudication    Discitis of lumbar region    Osteomyelitis of lumbar spine (CMS/HCC)    Past Medical History:   Diagnosis Date   • Arthritis    • Asthma    • Chronic sinusitis    • Coronary artery disease    • Eustachian tube dysfunction    • Heart disease    • Herpes simplex    • Hyperlipidemia    • Hypertension    • Knee dislocation    • Labral tear of right hip joint    • Laryngitis sicca    • Laryngitis, chronic    • MI (myocardial infarction) (CMS/HCC)    • Otorrhea    • Sensorineural hearing loss    • Sjogren's disease (CMS/HCC)         Plan:        1. MSSA bacteremia  2. Torn gluteus muscle s/p repair  3. Post-operative MSSA wound infection  4. RA  5. Chronic immunosuppression  6. Multifactorial anemia  7. Hypothyroidism  8. GERD  9. Hx CAD  10. Herpes simplex on chronic suppressive therapy  11. HTN  12. Right psoas abscess  13. L1-L2 discitis osteomyelitis  14. Constipation  15. Cervical spinal stenosis    Continue current treatment. Monitor counts. Increase  activity as tolerated. Maintain patient safety. Labs Thursday. Wound care per wound care team. Aggressive therapy as tolerated with activity/weightbearing restrictions per ortho recommendations.  Antibiotics under direction of ID. Continue pain control efforts and avoid oversedation. Continue bowel regimen.  Continue to hold aspirin, plavix and xarelto in anticipation of OR tomorrow. [facility] prior to and following OR.     Electronically signed by DANIELA Hill on 3/2/2021 at 11:41 CST     I have discussed the care of Tawny Shin, including pertinent history and exam findings, with the nurse practitioner.    I have seen and examined the patient and the key elements of all parts of the encounter have been performed by me.  I agree with the assessment, plan and orders as documented by DANIELA Kyle, after I modified the exam findings and the plan of treatments and the final version is my approved version of the assessment.        Electronically signed by Beni Urrutia MD on 3/2/2021 at 17:56 CST

## 2021-03-03 ENCOUNTER — APPOINTMENT (OUTPATIENT)
Dept: GENERAL RADIOLOGY | Facility: HOSPITAL | Age: 66
End: 2021-03-03

## 2021-03-03 ENCOUNTER — ANESTHESIA (OUTPATIENT)
Dept: PERIOP | Facility: HOSPITAL | Age: 66
End: 2021-03-03

## 2021-03-03 LAB
ABO GROUP BLD: NORMAL
BASOPHILS # BLD AUTO: 0.04 10*3/MM3 (ref 0–0.2)
BASOPHILS NFR BLD AUTO: 0.8 % (ref 0–1.5)
BH BB BLOOD EXPIRATION DATE: NORMAL
BH BB BLOOD EXPIRATION DATE: NORMAL
BH BB BLOOD TYPE BARCODE: 6200
BH BB BLOOD TYPE BARCODE: 6200
BH BB DISPENSE STATUS: NORMAL
BH BB DISPENSE STATUS: NORMAL
BH BB PRODUCT CODE: NORMAL
BH BB PRODUCT CODE: NORMAL
BH BB UNIT NUMBER: NORMAL
BH BB UNIT NUMBER: NORMAL
BLD GP AB SCN SERPL QL: NEGATIVE
CROSSMATCH INTERPRETATION: NORMAL
CROSSMATCH INTERPRETATION: NORMAL
CRP SERPL-MCNC: 11.77 MG/DL (ref 0–0.5)
DEPRECATED RDW RBC AUTO: 48.7 FL (ref 37–54)
EOSINOPHIL # BLD AUTO: 0.19 10*3/MM3 (ref 0–0.4)
EOSINOPHIL NFR BLD AUTO: 3.6 % (ref 0.3–6.2)
ERYTHROCYTE [DISTWIDTH] IN BLOOD BY AUTOMATED COUNT: 15.2 % (ref 12.3–15.4)
HCT VFR BLD AUTO: 23.9 % (ref 34–46.6)
HGB BLD-MCNC: 7.7 G/DL (ref 12–15.9)
IMM GRANULOCYTES # BLD AUTO: 0.06 10*3/MM3 (ref 0–0.05)
IMM GRANULOCYTES NFR BLD AUTO: 1.1 % (ref 0–0.5)
INR PPP: 1.25 (ref 0.91–1.09)
LYMPHOCYTES # BLD AUTO: 1.53 10*3/MM3 (ref 0.7–3.1)
LYMPHOCYTES NFR BLD AUTO: 28.9 % (ref 19.6–45.3)
MCH RBC QN AUTO: 28.4 PG (ref 26.6–33)
MCHC RBC AUTO-ENTMCNC: 32.2 G/DL (ref 31.5–35.7)
MCV RBC AUTO: 88.2 FL (ref 79–97)
MONOCYTES # BLD AUTO: 0.79 10*3/MM3 (ref 0.1–0.9)
MONOCYTES NFR BLD AUTO: 14.9 % (ref 5–12)
NEUTROPHILS NFR BLD AUTO: 2.68 10*3/MM3 (ref 1.7–7)
NEUTROPHILS NFR BLD AUTO: 50.7 % (ref 42.7–76)
NRBC BLD AUTO-RTO: 0 /100 WBC (ref 0–0.2)
PLATELET # BLD AUTO: 254 10*3/MM3 (ref 140–450)
PMV BLD AUTO: 9.8 FL (ref 6–12)
PROTHROMBIN TIME: 15.3 SECONDS (ref 11.9–14.6)
RBC # BLD AUTO: 2.71 10*6/MM3 (ref 3.77–5.28)
RH BLD: POSITIVE
T&S EXPIRATION DATE: NORMAL
UNIT  ABO: NORMAL
UNIT  ABO: NORMAL
UNIT  RH: NORMAL
UNIT  RH: NORMAL
WBC # BLD AUTO: 5.29 10*3/MM3 (ref 3.4–10.8)

## 2021-03-03 PROCEDURE — C1713 ANCHOR/SCREW BN/BN,TIS/BN: HCPCS | Performed by: NEUROLOGICAL SURGERY

## 2021-03-03 PROCEDURE — 76000 FLUOROSCOPY <1 HR PHYS/QHP: CPT

## 2021-03-03 PROCEDURE — 00NW0ZZ RELEASE CERVICAL SPINAL CORD, OPEN APPROACH: ICD-10-PCS | Performed by: NEUROLOGICAL SURGERY

## 2021-03-03 PROCEDURE — 25010000002 PROPOFOL 10 MG/ML EMULSION: Performed by: NURSE ANESTHETIST, CERTIFIED REGISTERED

## 2021-03-03 PROCEDURE — 25010000003 HYDROMORPHONE HCL PF 50 MG/5ML SOLUTION: Performed by: NURSE PRACTITIONER

## 2021-03-03 PROCEDURE — 25010000002 CEFAZOLIN PER 500 MG: Performed by: NURSE PRACTITIONER

## 2021-03-03 PROCEDURE — 97530 THERAPEUTIC ACTIVITIES: CPT

## 2021-03-03 PROCEDURE — 86901 BLOOD TYPING SEROLOGIC RH(D): CPT | Performed by: INTERNAL MEDICINE

## 2021-03-03 PROCEDURE — 22845 INSERT SPINE FIXATION DEVICE: CPT | Performed by: NEUROLOGICAL SURGERY

## 2021-03-03 PROCEDURE — 63710000001 PREDNISONE PER 5 MG: Performed by: INTERNAL MEDICINE

## 2021-03-03 PROCEDURE — 86900 BLOOD TYPING SEROLOGIC ABO: CPT | Performed by: INTERNAL MEDICINE

## 2021-03-03 PROCEDURE — 25010000002 VANCOMYCIN 1 G RECONSTITUTED SOLUTION: Performed by: NEUROLOGICAL SURGERY

## 2021-03-03 PROCEDURE — 85610 PROTHROMBIN TIME: CPT | Performed by: NEUROLOGICAL SURGERY

## 2021-03-03 PROCEDURE — 25810000003 SODIUM CHLORIDE 0.9 % WITH KCL 20 MEQ 20-0.9 MEQ/L-% SOLUTION: Performed by: NURSE PRACTITIONER

## 2021-03-03 PROCEDURE — 25010000002 HYDROMORPHONE PER 4 MG: Performed by: ANESTHESIOLOGY

## 2021-03-03 PROCEDURE — 0RG20A0 FUSION OF 2 OR MORE CERVICAL VERTEBRAL JOINTS WITH INTERBODY FUSION DEVICE, ANTERIOR APPROACH, ANTERIOR COLUMN, OPEN APPROACH: ICD-10-PCS | Performed by: NEUROLOGICAL SURGERY

## 2021-03-03 PROCEDURE — 01N10ZZ RELEASE CERVICAL NERVE, OPEN APPROACH: ICD-10-PCS | Performed by: NEUROLOGICAL SURGERY

## 2021-03-03 PROCEDURE — 86140 C-REACTIVE PROTEIN: CPT | Performed by: INTERNAL MEDICINE

## 2021-03-03 PROCEDURE — 4A11X4G MONITORING OF PERIPHERAL NERVOUS ELECTRICAL ACTIVITY, INTRAOPERATIVE, EXTERNAL APPROACH: ICD-10-PCS | Performed by: NEUROLOGICAL SURGERY

## 2021-03-03 PROCEDURE — 0RB30ZZ EXCISION OF CERVICAL VERTEBRAL DISC, OPEN APPROACH: ICD-10-PCS | Performed by: NEUROLOGICAL SURGERY

## 2021-03-03 PROCEDURE — 97606 NEG PRS WND THER DME>50 SQCM: CPT

## 2021-03-03 PROCEDURE — 86850 RBC ANTIBODY SCREEN: CPT | Performed by: INTERNAL MEDICINE

## 2021-03-03 PROCEDURE — 72040 X-RAY EXAM NECK SPINE 2-3 VW: CPT

## 2021-03-03 PROCEDURE — 86900 BLOOD TYPING SEROLOGIC ABO: CPT

## 2021-03-03 PROCEDURE — 85025 COMPLETE CBC W/AUTO DIFF WBC: CPT | Performed by: INTERNAL MEDICINE

## 2021-03-03 PROCEDURE — P9016 RBC LEUKOCYTES REDUCED: HCPCS

## 2021-03-03 PROCEDURE — 25010000002 PHENYLEPHRINE HCL 0.8 MG/10ML SOLUTION PREFILLED SYRINGE: Performed by: NURSE ANESTHETIST, CERTIFIED REGISTERED

## 2021-03-03 PROCEDURE — 25010000002 DEXAMETHASONE PER 1 MG: Performed by: NURSE ANESTHETIST, CERTIFIED REGISTERED

## 2021-03-03 PROCEDURE — 36430 TRANSFUSION BLD/BLD COMPNT: CPT

## 2021-03-03 PROCEDURE — 63081 REMOVE VERT BODY DCMPRN CRVL: CPT | Performed by: NEUROLOGICAL SURGERY

## 2021-03-03 PROCEDURE — 22854 INSJ BIOMECHANICAL DEVICE: CPT | Performed by: NEUROLOGICAL SURGERY

## 2021-03-03 PROCEDURE — 22554 ARTHRD ANT NTRBD MIN DSC CRV: CPT | Performed by: NEUROLOGICAL SURGERY

## 2021-03-03 PROCEDURE — 25010000002 HYDROMORPHONE 1 MG/ML SOLUTION: Performed by: INTERNAL MEDICINE

## 2021-03-03 PROCEDURE — 22585 ARTHRD ANT NTRBD MIN DSC EA: CPT | Performed by: NEUROLOGICAL SURGERY

## 2021-03-03 PROCEDURE — 86923 COMPATIBILITY TEST ELECTRIC: CPT

## 2021-03-03 PROCEDURE — 25010000002 PHENYLEPHRINE 10 MG/ML SOLUTION 1 ML VIAL: Performed by: NURSE ANESTHETIST, CERTIFIED REGISTERED

## 2021-03-03 DEVICE — SCREW 3120515 4.0 X 15 SELF DRILL VAR
Type: IMPLANTABLE DEVICE | Site: SPINE CERVICAL | Status: FUNCTIONAL
Brand: ATLANTIS® ANTERIOR CERVICAL PLATE SYSTEM

## 2021-03-03 DEVICE — CENTERPIECE 436013B 13MM B-SIZE
Type: IMPLANTABLE DEVICE | Site: SPINE CERVICAL | Status: FUNCTIONAL
Brand: T2 STRATOSPHERE™ EXPANDABLE CORPECTOMY SYSTEM

## 2021-03-03 DEVICE — HEMOST ABS SURGIFOAM SZ100 8X12 10MM: Type: IMPLANTABLE DEVICE | Site: SPINE CERVICAL | Status: FUNCTIONAL

## 2021-03-03 RX ORDER — SUFENTANIL CITRATE 50 UG/ML
INJECTION EPIDURAL; INTRAVENOUS AS NEEDED
Status: DISCONTINUED | OUTPATIENT
Start: 2021-03-03 | End: 2021-03-04 | Stop reason: SURG

## 2021-03-03 RX ORDER — SODIUM CHLORIDE 0.9 % (FLUSH) 0.9 %
3-10 SYRINGE (ML) INJECTION AS NEEDED
Status: DISCONTINUED | OUTPATIENT
Start: 2021-03-03 | End: 2021-03-06 | Stop reason: HOSPADM

## 2021-03-03 RX ORDER — PROPOFOL 10 MG/ML
VIAL (ML) INTRAVENOUS AS NEEDED
Status: DISCONTINUED | OUTPATIENT
Start: 2021-03-03 | End: 2021-03-04 | Stop reason: SURG

## 2021-03-03 RX ORDER — HYDROMORPHONE HYDROCHLORIDE 1 MG/ML
0.5 INJECTION, SOLUTION INTRAMUSCULAR; INTRAVENOUS; SUBCUTANEOUS
Status: DISCONTINUED | OUTPATIENT
Start: 2021-03-03 | End: 2021-03-03 | Stop reason: HOSPADM

## 2021-03-03 RX ORDER — OXYCODONE AND ACETAMINOPHEN 10; 325 MG/1; MG/1
1 TABLET ORAL ONCE AS NEEDED
Status: DISCONTINUED | OUTPATIENT
Start: 2021-03-03 | End: 2021-03-03 | Stop reason: HOSPADM

## 2021-03-03 RX ORDER — DIAZEPAM 2 MG/1
2 TABLET ORAL EVERY 6 HOURS PRN
Status: DISCONTINUED | OUTPATIENT
Start: 2021-03-03 | End: 2021-03-06 | Stop reason: HOSPADM

## 2021-03-03 RX ORDER — LABETALOL HYDROCHLORIDE 5 MG/ML
20 INJECTION, SOLUTION INTRAVENOUS
Status: DISCONTINUED | OUTPATIENT
Start: 2021-03-03 | End: 2021-03-06 | Stop reason: HOSPADM

## 2021-03-03 RX ORDER — SODIUM CHLORIDE, SODIUM LACTATE, POTASSIUM CHLORIDE, CALCIUM CHLORIDE 600; 310; 30; 20 MG/100ML; MG/100ML; MG/100ML; MG/100ML
30 INJECTION, SOLUTION INTRAVENOUS CONTINUOUS
Status: DISCONTINUED | OUTPATIENT
Start: 2021-03-03 | End: 2021-03-03

## 2021-03-03 RX ORDER — SODIUM CHLORIDE 0.9 % (FLUSH) 0.9 %
3 SYRINGE (ML) INJECTION AS NEEDED
Status: DISCONTINUED | OUTPATIENT
Start: 2021-03-03 | End: 2021-03-03 | Stop reason: HOSPADM

## 2021-03-03 RX ORDER — BUPIVACAINE HCL/0.9 % NACL/PF 0.1 %
2 PLASTIC BAG, INJECTION (ML) EPIDURAL ONCE
Status: DISCONTINUED | OUTPATIENT
Start: 2021-03-03 | End: 2021-03-03

## 2021-03-03 RX ORDER — ACETAMINOPHEN 650 MG/1
650 SUPPOSITORY RECTAL EVERY 4 HOURS PRN
Status: DISCONTINUED | OUTPATIENT
Start: 2021-03-03 | End: 2021-03-06 | Stop reason: HOSPADM

## 2021-03-03 RX ORDER — SODIUM CHLORIDE, SODIUM LACTATE, POTASSIUM CHLORIDE, CALCIUM CHLORIDE 600; 310; 30; 20 MG/100ML; MG/100ML; MG/100ML; MG/100ML
100 INJECTION, SOLUTION INTRAVENOUS CONTINUOUS
Status: DISCONTINUED | OUTPATIENT
Start: 2021-03-03 | End: 2021-03-03

## 2021-03-03 RX ORDER — DEXAMETHASONE SODIUM PHOSPHATE 4 MG/ML
INJECTION, SOLUTION INTRA-ARTICULAR; INTRALESIONAL; INTRAMUSCULAR; INTRAVENOUS; SOFT TISSUE AS NEEDED
Status: DISCONTINUED | OUTPATIENT
Start: 2021-03-03 | End: 2021-03-04 | Stop reason: SURG

## 2021-03-03 RX ORDER — SODIUM CHLORIDE, SODIUM LACTATE, POTASSIUM CHLORIDE, CALCIUM CHLORIDE 600; 310; 30; 20 MG/100ML; MG/100ML; MG/100ML; MG/100ML
1000 INJECTION, SOLUTION INTRAVENOUS CONTINUOUS
Status: DISCONTINUED | OUTPATIENT
Start: 2021-03-03 | End: 2021-03-03

## 2021-03-03 RX ORDER — SODIUM CHLORIDE AND POTASSIUM CHLORIDE 150; 900 MG/100ML; MG/100ML
100 INJECTION, SOLUTION INTRAVENOUS CONTINUOUS
Status: DISCONTINUED | OUTPATIENT
Start: 2021-03-03 | End: 2021-03-06 | Stop reason: HOSPADM

## 2021-03-03 RX ORDER — LIDOCAINE HYDROCHLORIDE 10 MG/ML
0.5 INJECTION, SOLUTION EPIDURAL; INFILTRATION; INTRACAUDAL; PERINEURAL ONCE AS NEEDED
Status: DISCONTINUED | OUTPATIENT
Start: 2021-03-03 | End: 2021-03-03

## 2021-03-03 RX ORDER — FLUMAZENIL 0.1 MG/ML
0.2 INJECTION INTRAVENOUS AS NEEDED
Status: DISCONTINUED | OUTPATIENT
Start: 2021-03-03 | End: 2021-03-03 | Stop reason: HOSPADM

## 2021-03-03 RX ORDER — NALOXONE HCL 0.4 MG/ML
0.1 VIAL (ML) INJECTION
Status: DISCONTINUED | OUTPATIENT
Start: 2021-03-03 | End: 2021-03-06 | Stop reason: HOSPADM

## 2021-03-03 RX ORDER — NALOXONE HCL 0.4 MG/ML
0.04 VIAL (ML) INJECTION AS NEEDED
Status: DISCONTINUED | OUTPATIENT
Start: 2021-03-03 | End: 2021-03-03 | Stop reason: HOSPADM

## 2021-03-03 RX ORDER — LABETALOL HYDROCHLORIDE 5 MG/ML
5 INJECTION, SOLUTION INTRAVENOUS
Status: DISCONTINUED | OUTPATIENT
Start: 2021-03-03 | End: 2021-03-03 | Stop reason: HOSPADM

## 2021-03-03 RX ORDER — SODIUM CHLORIDE 0.9 % (FLUSH) 0.9 %
10 SYRINGE (ML) INJECTION AS NEEDED
Status: DISCONTINUED | OUTPATIENT
Start: 2021-03-03 | End: 2021-03-06 | Stop reason: HOSPADM

## 2021-03-03 RX ORDER — FENTANYL CITRATE 50 UG/ML
25 INJECTION, SOLUTION INTRAMUSCULAR; INTRAVENOUS
Status: DISCONTINUED | OUTPATIENT
Start: 2021-03-03 | End: 2021-03-03 | Stop reason: HOSPADM

## 2021-03-03 RX ORDER — ONDANSETRON 2 MG/ML
4 INJECTION INTRAMUSCULAR; INTRAVENOUS EVERY 6 HOURS PRN
Status: DISCONTINUED | OUTPATIENT
Start: 2021-03-03 | End: 2021-03-06 | Stop reason: HOSPADM

## 2021-03-03 RX ORDER — SODIUM CHLORIDE 0.9 % (FLUSH) 0.9 %
10 SYRINGE (ML) INJECTION EVERY 12 HOURS SCHEDULED
Status: DISCONTINUED | OUTPATIENT
Start: 2021-03-03 | End: 2021-03-06 | Stop reason: HOSPADM

## 2021-03-03 RX ORDER — HEPARIN SODIUM 5000 [USP'U]/ML
5000 INJECTION, SOLUTION INTRAVENOUS; SUBCUTANEOUS EVERY 12 HOURS SCHEDULED
Status: DISCONTINUED | OUTPATIENT
Start: 2021-03-04 | End: 2021-03-06 | Stop reason: HOSPADM

## 2021-03-03 RX ORDER — PHENYLEPHRINE HCL IN 0.9% NACL 0.8MG/10ML
SYRINGE (ML) INTRAVENOUS AS NEEDED
Status: DISCONTINUED | OUTPATIENT
Start: 2021-03-03 | End: 2021-03-04 | Stop reason: SURG

## 2021-03-03 RX ORDER — ACETAMINOPHEN 325 MG/1
650 TABLET ORAL EVERY 4 HOURS PRN
Status: DISCONTINUED | OUTPATIENT
Start: 2021-03-03 | End: 2021-03-06 | Stop reason: HOSPADM

## 2021-03-03 RX ORDER — ONDANSETRON 2 MG/ML
4 INJECTION INTRAMUSCULAR; INTRAVENOUS AS NEEDED
Status: DISCONTINUED | OUTPATIENT
Start: 2021-03-03 | End: 2021-03-03 | Stop reason: HOSPADM

## 2021-03-03 RX ORDER — KETAMINE HYDROCHLORIDE 50 MG/ML
INJECTION, SOLUTION, CONCENTRATE INTRAMUSCULAR; INTRAVENOUS AS NEEDED
Status: DISCONTINUED | OUTPATIENT
Start: 2021-03-03 | End: 2021-03-04 | Stop reason: SURG

## 2021-03-03 RX ORDER — LIDOCAINE HYDROCHLORIDE 20 MG/ML
INJECTION, SOLUTION EPIDURAL; INFILTRATION; INTRACAUDAL; PERINEURAL AS NEEDED
Status: DISCONTINUED | OUTPATIENT
Start: 2021-03-03 | End: 2021-03-04 | Stop reason: SURG

## 2021-03-03 RX ORDER — VANCOMYCIN HYDROCHLORIDE 1 G/20ML
INJECTION, POWDER, LYOPHILIZED, FOR SOLUTION INTRAVENOUS AS NEEDED
Status: DISCONTINUED | OUTPATIENT
Start: 2021-03-03 | End: 2021-03-03 | Stop reason: HOSPADM

## 2021-03-03 RX ORDER — ONDANSETRON 4 MG/1
4 TABLET, FILM COATED ORAL EVERY 6 HOURS PRN
Status: DISCONTINUED | OUTPATIENT
Start: 2021-03-03 | End: 2021-03-06 | Stop reason: HOSPADM

## 2021-03-03 RX ORDER — MAGNESIUM HYDROXIDE 1200 MG/15ML
LIQUID ORAL AS NEEDED
Status: DISCONTINUED | OUTPATIENT
Start: 2021-03-03 | End: 2021-03-03 | Stop reason: HOSPADM

## 2021-03-03 RX ORDER — AMOXICILLIN 250 MG
2 CAPSULE ORAL 2 TIMES DAILY
Status: DISCONTINUED | OUTPATIENT
Start: 2021-03-03 | End: 2021-03-06 | Stop reason: HOSPADM

## 2021-03-03 RX ORDER — SODIUM CHLORIDE 0.9 % (FLUSH) 0.9 %
3 SYRINGE (ML) INJECTION EVERY 12 HOURS SCHEDULED
Status: DISCONTINUED | OUTPATIENT
Start: 2021-03-03 | End: 2021-03-03 | Stop reason: HOSPADM

## 2021-03-03 RX ADMIN — SODIUM CHLORIDE, POTASSIUM CHLORIDE, SODIUM LACTATE AND CALCIUM CHLORIDE 1000 ML: 600; 310; 30; 20 INJECTION, SOLUTION INTRAVENOUS at 12:55

## 2021-03-03 RX ADMIN — CEFAZOLIN SODIUM 2 G: 10 INJECTION, POWDER, FOR SOLUTION INTRAVENOUS at 22:46

## 2021-03-03 RX ADMIN — SUFENTANIL CITRATE 20 MCG: 50 INJECTION EPIDURAL; INTRAVENOUS at 13:38

## 2021-03-03 RX ADMIN — LIDOCAINE HYDROCHLORIDE 100 MG: 20 INJECTION, SOLUTION EPIDURAL; INFILTRATION; INTRACAUDAL; PERINEURAL at 13:40

## 2021-03-03 RX ADMIN — CEFAZOLIN SODIUM 1 G: 10 INJECTION, POWDER, FOR SOLUTION INTRAVENOUS at 16:51

## 2021-03-03 RX ADMIN — Medication 80 MCG: at 13:42

## 2021-03-03 RX ADMIN — DEXAMETHASONE SODIUM PHOSPHATE 8 MG: 4 INJECTION, SOLUTION INTRAMUSCULAR; INTRAVENOUS at 15:05

## 2021-03-03 RX ADMIN — PROPOFOL 50 MCG/KG/MIN: 10 INJECTION, EMULSION INTRAVENOUS at 13:38

## 2021-03-03 RX ADMIN — OXYCODONE HYDROCHLORIDE AND ACETAMINOPHEN 500 MG: 500 TABLET ORAL at 22:25

## 2021-03-03 RX ADMIN — LIDOCAINE HYDROCHLORIDE 20 MG: 20 INJECTION, SOLUTION EPIDURAL; INFILTRATION; INTRACAUDAL; PERINEURAL at 15:40

## 2021-03-03 RX ADMIN — KETAMINE HYDROCHLORIDE 25 MG: 50 INJECTION, SOLUTION INTRAMUSCULAR; INTRAVENOUS at 14:30

## 2021-03-03 RX ADMIN — LIDOCAINE HYDROCHLORIDE 20 MG: 20 INJECTION, SOLUTION EPIDURAL; INFILTRATION; INTRACAUDAL; PERINEURAL at 14:30

## 2021-03-03 RX ADMIN — CARVEDILOL 25 MG: 25 TABLET, FILM COATED ORAL at 22:25

## 2021-03-03 RX ADMIN — Medication 250 MG: at 22:24

## 2021-03-03 RX ADMIN — LIDOCAINE HYDROCHLORIDE 20 MG: 20 INJECTION, SOLUTION EPIDURAL; INFILTRATION; INTRACAUDAL; PERINEURAL at 16:10

## 2021-03-03 RX ADMIN — GABAPENTIN 100 MG: 100 CAPSULE ORAL at 22:01

## 2021-03-03 RX ADMIN — LIDOCAINE HYDROCHLORIDE 20 MG: 20 INJECTION, SOLUTION EPIDURAL; INFILTRATION; INTRACAUDAL; PERINEURAL at 14:40

## 2021-03-03 RX ADMIN — LIDOCAINE HYDROCHLORIDE 20 MG: 20 INJECTION, SOLUTION EPIDURAL; INFILTRATION; INTRACAUDAL; PERINEURAL at 15:10

## 2021-03-03 RX ADMIN — DOCUSATE SODIUM 100 MG: 100 CAPSULE ORAL at 22:25

## 2021-03-03 RX ADMIN — CEFAZOLIN SODIUM 2 G: 10 INJECTION, POWDER, FOR SOLUTION INTRAVENOUS at 13:50

## 2021-03-03 RX ADMIN — SODIUM CHLORIDE, POTASSIUM CHLORIDE, SODIUM LACTATE AND CALCIUM CHLORIDE 30 ML/HR: 600; 310; 30; 20 INJECTION, SOLUTION INTRAVENOUS at 12:55

## 2021-03-03 RX ADMIN — SODIUM CHLORIDE 5 MG/HR: 9 INJECTION, SOLUTION INTRAVENOUS at 22:03

## 2021-03-03 RX ADMIN — POTASSIUM CHLORIDE AND SODIUM CHLORIDE 100 ML/HR: 900; 150 INJECTION, SOLUTION INTRAVENOUS at 22:04

## 2021-03-03 RX ADMIN — LIDOCAINE HYDROCHLORIDE 20 MG: 20 INJECTION, SOLUTION EPIDURAL; INFILTRATION; INTRACAUDAL; PERINEURAL at 15:30

## 2021-03-03 RX ADMIN — PHENYLEPHRINE HYDROCHLORIDE 0.1 MCG/KG/MIN: 10 INJECTION INTRAVENOUS at 13:50

## 2021-03-03 RX ADMIN — LIDOCAINE HYDROCHLORIDE 60 MG: 20 INJECTION, SOLUTION EPIDURAL; INFILTRATION; INTRACAUDAL; PERINEURAL at 14:25

## 2021-03-03 RX ADMIN — SUFENTANIL CITRATE 10 MCG: 50 INJECTION EPIDURAL; INTRAVENOUS at 16:10

## 2021-03-03 RX ADMIN — LIDOCAINE HYDROCHLORIDE 20 MG: 20 INJECTION, SOLUTION EPIDURAL; INFILTRATION; INTRACAUDAL; PERINEURAL at 15:00

## 2021-03-03 RX ADMIN — DOCUSATE SODIUM 50 MG AND SENNOSIDES 8.6 MG 2 TABLET: 8.6; 5 TABLET, FILM COATED ORAL at 22:01

## 2021-03-03 RX ADMIN — GUAIFENESIN 600 MG: 600 TABLET, EXTENDED RELEASE ORAL at 22:24

## 2021-03-03 RX ADMIN — LIDOCAINE HYDROCHLORIDE 20 MG: 20 INJECTION, SOLUTION EPIDURAL; INFILTRATION; INTRACAUDAL; PERINEURAL at 14:50

## 2021-03-03 RX ADMIN — PROPOFOL 20 MG: 10 INJECTION, EMULSION INTRAVENOUS at 13:54

## 2021-03-03 RX ADMIN — KETAMINE HYDROCHLORIDE 25 MG: 50 INJECTION, SOLUTION INTRAMUSCULAR; INTRAVENOUS at 16:02

## 2021-03-03 RX ADMIN — SUFENTANIL CITRATE 20 MCG: 50 INJECTION EPIDURAL; INTRAVENOUS at 14:00

## 2021-03-03 RX ADMIN — SUFENTANIL CITRATE 10 MCG: 50 INJECTION EPIDURAL; INTRAVENOUS at 14:17

## 2021-03-03 RX ADMIN — KETAMINE HYDROCHLORIDE 50 MG: 50 INJECTION, SOLUTION INTRAMUSCULAR; INTRAVENOUS at 13:40

## 2021-03-03 RX ADMIN — Medication 80 MCG: at 13:47

## 2021-03-03 RX ADMIN — LIDOCAINE HYDROCHLORIDE 20 MG: 20 INJECTION, SOLUTION EPIDURAL; INFILTRATION; INTRACAUDAL; PERINEURAL at 15:20

## 2021-03-03 RX ADMIN — HYDROMORPHONE HYDROCHLORIDE: 10 INJECTION, SOLUTION INTRAMUSCULAR; INTRAVENOUS; SUBCUTANEOUS at 22:23

## 2021-03-03 RX ADMIN — SODIUM CHLORIDE 5 MG: 9 INJECTION, SOLUTION INTRAVENOUS at 19:03

## 2021-03-03 RX ADMIN — Medication 220 MG: at 22:24

## 2021-03-03 RX ADMIN — SUFENTANIL CITRATE 40 MCG: 50 INJECTION EPIDURAL; INTRAVENOUS at 16:44

## 2021-03-03 RX ADMIN — LIDOCAINE HYDROCHLORIDE 20 MG: 20 INJECTION, SOLUTION EPIDURAL; INFILTRATION; INTRACAUDAL; PERINEURAL at 15:50

## 2021-03-03 RX ADMIN — LIDOCAINE HYDROCHLORIDE 20 MG: 20 INJECTION, SOLUTION EPIDURAL; INFILTRATION; INTRACAUDAL; PERINEURAL at 16:00

## 2021-03-03 RX ADMIN — PROPOFOL 120 MG: 10 INJECTION, EMULSION INTRAVENOUS at 13:40

## 2021-03-03 RX ADMIN — HYDROMORPHONE HYDROCHLORIDE 0.5 MG: 1 INJECTION, SOLUTION INTRAMUSCULAR; INTRAVENOUS; SUBCUTANEOUS at 19:24

## 2021-03-03 NOTE — PROGRESS NOTES
ARGELIA Guerrero APRN        Internal Medicine Progress Note    3/3/2021   10:02 CST    Name:  Tawny Shin  MRN:    [id number]     Acct:     [id number]   Room:  [address] [place] [address] Day: 0     Admit Date: 2/12/2021  3:58 PM  PCP: Davon Urrutia MD    Subjective:     C/C: Wound care and rehabilitation efforts.     Interval History: Status: stable. Resting in bed. Family at bedside. Low grade temp this morning. Still with increased generalized pain - worse with movement. Undergoing wound vac change. Anticipating OR later today and will be on PEGGY from our facility.   Review of Systems   Constitution: Positive for decreased appetite and malaise/fatigue. Negative for chills and fever.   HENT: Negative for congestion, ear discharge and sore throat.    Eyes: Negative for blurred vision, double vision and pain.   Cardiovascular: Negative for chest pain, claudication and dyspnea on exertion.   Respiratory: Negative for cough, hemoptysis and shortness of breath.    Endocrine: Positive for heat intolerance. Negative for cold intolerance.   Hematologic/Lymphatic: Negative for adenopathy and bleeding problem.   Skin: Positive for poor wound healing. Negative for itching and rash.   Musculoskeletal: Positive for back pain, falls and joint pain.   Gastrointestinal: Positive for constipation. Negative for abdominal pain, diarrhea, nausea and vomiting.   Genitourinary: Negative for bladder incontinence, flank pain and frequency.   Neurological: Positive for weakness. Negative for difficulty with concentration.   Psychiatric/Behavioral: Negative for altered mental status, depression and suicidal ideas.   Allergic/Immunologic: Negative for environmental allergies, HIV exposure and hives.         Medications:     Allergies:   Allergies   Allergen Reactions   • Atorvastatin Other (See Comments)     LEG CRAMPS     • Amoxicillin Rash   • Escitalopram Rash   • Nabumetone Rash   • Niacin Er Rash   •  Penicillins Rash   • Simvastatin Rash       Current Meds:   Current Facility-Administered Medications:   •  acetaminophen (TYLENOL) tablet 650 mg, 650 mg, Oral, Q4H PRN, Beni Urrutia MD  •  ascorbic acid (VITAMIN C) tablet 500 mg, 500 mg, Oral, BID, Beni Urrutia MD  •  bisacodyl (DULCOLAX) suppository 10 mg, 10 mg, Rectal, Daily PRN, Autumn Ramirez, APRN  •  budesonide (PULMICORT) nebulizer solution 0.5 mg, 0.5 mg, Nebulization, BID PRN, Beni Urrutia MD  •  carvedilol (COREG) tablet 25 mg, 25 mg, Oral, BID With Meals, Beni Urrutia MD  •  ceFAZolin in 0.9% normal saline (ANCEF) IVPB solution 2 g, 2 g, Intravenous, Q8H, Beni Urrutia MD  •  cloNIDine (CATAPRES) tablet 0.1 mg, 0.1 mg, Oral, BID PRN, Beni Urrutia MD  •  cyclobenzaprine (FLEXERIL) tablet 10 mg, 10 mg, Oral, TID PRN, Beni Urrutia MD  •  docusate sodium (COLACE) capsule 100 mg, 100 mg, Oral, BID, Francie Golden, APRN  •  folic acid (FOLVITE) tablet 1 mg, 1 mg, Oral, Daily, Beni Urrutia MD  •  gabapentin (NEURONTIN) capsule 100 mg, 100 mg, Oral, Q12H, Beni Urrutia MD  •  guaiFENesin (MUCINEX) 12 hr tablet 600 mg, 600 mg, Oral, Q12H, Beni Urrutia MD  •  HYDROcodone-acetaminophen (NORCO)  MG per tablet 1 tablet, 1 tablet, Oral, Q4H PRN, Beni Urrutia MD  •  HYDROmorphone (DILAUDID) injection 1 mg, 1 mg, Intravenous, Daily PRN, Beni Urrutia MD  •  HYDROmorphone (DILAUDID) injection 1 mg, 1 mg, Intravenous, Q4H PRN, Beni Urrutia MD  •  isosorbide mononitrate (IMDUR) 24 hr tablet 60 mg, 60 mg, Oral, BID - Nitrates, Beni Urrutia MD  •  levothyroxine (SYNTHROID, LEVOTHROID) tablet 75 mcg, 75 mcg, Oral, Q AM, Beni Urrutia MD  •  magnesium hydroxide (MILK OF MAGNESIA) suspension 2400 mg/10mL 10 mL, 10 mL, Oral, Daily PRN, Beni Urrutia MD  •  multivitamin with minerals 1 tablet, 1 tablet, Oral,  "Daily, Beni Urrutia MD  •  nitroglycerin (NITROSTAT) SL tablet 0.4 mg, 0.4 mg, Sublingual, Q5 Min PRN, Beni Urrutia MD  •  pantoprazole (PROTONIX) EC tablet 40 mg, 40 mg, Oral, Q AM, Beni Urrutia MD  •  polyethylene glycol (MIRALAX) packet 17 g, 17 g, Oral, Daily, Francie Golden APRN  •  predniSONE (DELTASONE) tablet 5 mg, 5 mg, Oral, Daily With Breakfast, Beni Urrutia MD  •  saccharomyces boulardii (FLORASTOR) capsule 250 mg, 250 mg, Oral, BID, Beni Urrutia MD  •  sodium chloride 0.9 % bolus 500 mL, 500 mL, Intravenous, PRN, Beni Urrutia MD  •  traMADol (ULTRAM) tablet 50 mg, 50 mg, Oral, Q6H PRN, Beni Urrutia MD  •  trolamine salicylate (ASPERCREME) 10 % cream, , Topical, Q4H PRN, Beni Urrutia MD  •  valACYclovir (VALTREX) tablet 500 mg, 500 mg, Oral, Q24H, Beni Urrutia MD  •  zinc sulfate (ZINCATE) capsule 220 mg, 220 mg, Oral, Daily, Beni Urrutia MD    Data:     Code Status:    There are no questions and answers to display.       Family History   Problem Relation Age of Onset   • Cancer Mother    • Heart disease Father        Social History     Socioeconomic History   • Marital status:      Spouse name: Not on file   • Number of children: Not on file   • Years of education: Not on file   • Highest education level: Not on file   Tobacco Use   • Smoking status: Never Smoker   • Smokeless tobacco: Never Used   Substance and Sexual Activity   • Alcohol use: No   • Drug use: Never   • Sexual activity: Defer       Vitals:  Ht 170.2 cm (67.01\")   Wt 102 kg (224 lb)   BMI 35.07 kg/m²   T 99.2 P 87 R 22 /72 Sp02 94% (room air)          I/O (24Hr):  No intake or output data in the 24 hours ending 03/03/21 1002    Labs and imaging:      Recent Results (from the past 12 hour(s))   C-reactive Protein    Collection Time: 03/03/21  4:54 AM    Specimen: Blood   Result Value Ref Range    C-Reactive Protein " 11.77 (H) 0.00 - 0.50 mg/dL   CBC Auto Differential    Collection Time: 03/03/21  4:54 AM    Specimen: Blood   Result Value Ref Range    WBC 5.29 3.40 - 10.80 10*3/mm3    RBC 2.71 (L) 3.77 - 5.28 10*6/mm3    Hemoglobin 7.7 (L) 12.0 - 15.9 g/dL    Hematocrit 23.9 (L) 34.0 - 46.6 %    MCV 88.2 79.0 - 97.0 fL    MCH 28.4 26.6 - 33.0 pg    MCHC 32.2 31.5 - 35.7 g/dL    RDW 15.2 12.3 - 15.4 %    RDW-SD 48.7 37.0 - 54.0 fl    MPV 9.8 6.0 - 12.0 fL    Platelets 254 140 - 450 10*3/mm3    Neutrophil % 50.7 42.7 - 76.0 %    Lymphocyte % 28.9 19.6 - 45.3 %    Monocyte % 14.9 (H) 5.0 - 12.0 %    Eosinophil % 3.6 0.3 - 6.2 %    Basophil % 0.8 0.0 - 1.5 %    Immature Grans % 1.1 (H) 0.0 - 0.5 %    Neutrophils, Absolute 2.68 1.70 - 7.00 10*3/mm3    Lymphocytes, Absolute 1.53 0.70 - 3.10 10*3/mm3    Monocytes, Absolute 0.79 0.10 - 0.90 10*3/mm3    Eosinophils, Absolute 0.19 0.00 - 0.40 10*3/mm3    Basophils, Absolute 0.04 0.00 - 0.20 10*3/mm3    Immature Grans, Absolute 0.06 (H) 0.00 - 0.05 10*3/mm3    nRBC 0.0 0.0 - 0.2 /100 WBC   Protime-INR    Collection Time: 03/03/21  4:55 AM    Specimen: Blood   Result Value Ref Range    Protime 15.3 (H) 11.9 - 14.6 Seconds    INR 1.25 (H) 0.91 - 1.09   Prepare Fresh Frozen Plasma, 2 Units    Collection Time: 03/03/21  5:40 AM   Result Value Ref Range    Product Code H6768Y87     Unit Number [id number]     UNIT  ABO A     UNIT  RH POS     Dispense Status PT     Blood Expiration Date 202103022214     Blood Type Barcode 6200     Product Code W3305AK3     Unit Number [id number]     UNIT  ABO A     UNIT  RH POS     Dispense Status IS     Blood Expiration Date 202103031902     Blood Type Barcode 6200    Prepare RBC, 2 Units    Collection Time: 03/03/21  8:48 AM   Result Value Ref Range    Product Code H0493V62     Unit Number [id number]     UNIT  ABO A     UNIT  RH POS     Crossmatch Interpretation Compatible     Dispense Status XM     Blood Expiration Date 202103232359     Blood  Type Barcode 6200     Product Code Q0685W05     Unit Number [id number]     UNIT  ABO A     UNIT  RH POS     Crossmatch Interpretation Compatible     Dispense Status XM     Blood Expiration Date 202103232359     Blood Type Barcode 6200        Physical Examination:        Physical Exam  Vitals signs and nursing note reviewed.   Constitutional:       Appearance: Normal appearance.   HENT:      Head: Normocephalic and atraumatic.      Right Ear: External ear normal.      Left Ear: External ear normal.      Nose: Nose normal.      Mouth/Throat:      Mouth: Mucous membranes are moist.      Pharynx: Oropharynx is clear.   Eyes:      Extraocular Movements: Extraocular movements intact.      Pupils: Pupils are equal, round, and reactive to light.   Neck:      Musculoskeletal: Normal range of motion. Pain with movement present.   Cardiovascular:      Rate and Rhythm: Normal rate and regular rhythm.      Pulses: Normal pulses.      Heart sounds: Normal heart sounds.   Pulmonary:      Effort: Pulmonary effort is normal.      Breath sounds: Normal breath sounds.   Abdominal:      General: Abdomen is flat. Bowel sounds are normal.      Palpations: Abdomen is soft.   Musculoskeletal:      Comments: Generalized weakness  Limited ROM RLE   Skin:     General: Skin is warm and dry.      Capillary Refill: Capillary refill takes less than 2 seconds.      Comments: Wound vac intact   Neurological:      General: No focal deficit present.      Mental Status: She is alert and oriented to person, place, and time.   Psychiatric:         Mood and Affect: Mood normal.         Behavior: Behavior normal.         Thought Content: Thought content normal.           Assessment:             Stenosis of cervical spine with myelopathy (CMS/HCC)    Spinal stenosis, lumbar region, with neurogenic claudication    Discitis of lumbar region    Osteomyelitis of lumbar spine (CMS/HCC)    Past Medical History:   Diagnosis Date   • Arthritis    • Asthma     • Chronic sinusitis    • Coronary artery disease    • Eustachian tube dysfunction    • Heart disease    • Herpes simplex    • Hyperlipidemia    • Hypertension    • Knee dislocation    • Labral tear of right hip joint    • Laryngitis sicca    • Laryngitis, chronic    • MI (myocardial infarction) (CMS/HCC)    • Otorrhea    • Sensorineural hearing loss    • Sjogren's disease (CMS/[facility])         Plan:        1. MSSA bacteremia  2. Torn gluteus muscle s/p repair  3. Post-operative MSSA wound infection  4. RA  5. Chronic immunosuppression  6. Multifactorial anemia  7. Hypothyroidism  8. GERD  9. Hx CAD  10. Herpes simplex on chronic suppressive therapy  11. HTN  12. Right psoas abscess  13. L1-L2 discitis osteomyelitis  14. Constipation  15. Cervical spinal stenosis    Continue current treatment. Monitor counts. Increase activity as tolerated. Maintain patient safety.  Wound care per wound care team. Aggressive therapy as tolerated with activity/weightbearing restrictions per ortho recommendations.  Antibiotics under direction of ID. Continue pain control efforts and avoid oversedation. Continue bowel regimen.  Continue to hold aspirin, plavix and xarelto in anticipation of OR today. Plans to transition to PEGGY from [facility] while admitted to [facility] prior to and following OR.     Electronically signed by DANIELA Hill on 3/3/2021 at 10:02 CST

## 2021-03-03 NOTE — PROGRESS NOTES
Infectious Diseases Progress Note    Patient:  Tawny Shin  YOB: 1955  MRN: [id number]   Admit date: 2/12/2021   Admitting Physician: Beni Urrutia MD  Primary Care Physician: Davon Urrutia MD    Chief Complaint/Interval History: Spoke with interventional radiology twice yesterday.  Reviewed the follow-up CT scan with them.  They were not able to see much fluid on CT.  Explained her previous MRI had also been more impressive than her CT.  Suggested they go ahead and proceed with attempt at aspiration of right psoas abscess.  I successfully completed aspiration.  20 mL of purulent material was removed.  Contacted microbiology.  Make sure recommended studies have been ordered.  Gram stain of fluid appears to show gram-positive cocci.  Cultures no growth to date.  She has some ongoing low back discomfort.  Overall stable.  No urinary symptoms.  Voiding without difficulty.  She had a loose pudding consistency stool this morning.  No nausea or vomiting.  She has a peripheral IV without pain or discomfort.  No rash or skin itching.  She indicates Dr. Shabbir Shin (son) just spoke with her.  She will have surgery later today on her neck.    No intake or output data in the 24 hours ending 03/03/21 0926  Allergies:   Allergies   Allergen Reactions   • Atorvastatin Other (See Comments)     LEG CRAMPS     • Amoxicillin Rash   • Escitalopram Rash   • Nabumetone Rash   • Niacin Er Rash   • Penicillins Rash   • Simvastatin Rash     Current Scheduled Medications:   ascorbic acid, 500 mg, Oral, BID  carvedilol, 25 mg, Oral, BID With Meals  ceFAZolin, 2 g, Intravenous, Q8H  docusate sodium, 100 mg, Oral, BID  folic acid, 1 mg, Oral, Daily  gabapentin, 100 mg, Oral, Q12H  guaiFENesin, 600 mg, Oral, Q12H  isosorbide mononitrate, 60 mg, Oral, BID - Nitrates  levothyroxine, 75 mcg, Oral, Q AM  multivitamin with minerals, 1 tablet, Oral, Daily  pantoprazole, 40 mg, Oral, Q AM  polyethylene glycol,  "17 g, Oral, Daily  predniSONE, 5 mg, Oral, Daily With Breakfast  saccharomyces boulardii, 250 mg, Oral, BID  valACYclovir, 500 mg, Oral, Q24H  zinc sulfate, 220 mg, Oral, Daily      Current PRN Medications:  •  acetaminophen  •  bisacodyl  •  budesonide  •  cloNIDine  •  cyclobenzaprine  •  HYDROcodone-acetaminophen  •  HYDROmorphone  •  HYDROmorphone  •  magnesium hydroxide  •  nitroglycerin  •  sodium chloride  •  traMADol  •  trolamine salicylate    Review of Systems see HPI    Vital Signs:  Ht 170.2 cm (67.01\")   Wt 102 kg (224 lb)   BMI 35.07 kg/m²     Physical Exam  Vital signs - reviewed.  Line/IV (right forearm 20-gauge peripheral) site - No erythema, warmth, induration, or tenderness.  She is comfortable appearing.  She is not coughing.  She does not appear to be short of breath.  She is well, alert, oriented, and responsive.  She was seen via [facility] telehealth.    Lab Results:  CBC:   Results from last 7 days   Lab Units 03/03/21  0454 03/02/21  0331 03/01/21  1250 03/01/21  0459 02/28/21  0430 02/27/21  0437 02/26/21  0346 02/25/21  0439   WBC 10*3/mm3 5.29 6.10  --  6.52 5.38 6.36 6.39 6.68   HEMOGLOBIN g/dL 7.7* 7.8*  --  8.9* 8.7* 7.8* 8.8* 8.7*   HEMATOCRIT % 23.9* 24.8* 27.6* 27.6* 26.4* 24.5* 27.7* 27.2*   PLATELETS 10*3/mm3 254 223 251 251 286 272 299 321     BMP:  Results from last 7 days   Lab Units 02/26/21  0346 02/25/21  0502   SODIUM mmol/L 136 137   POTASSIUM mmol/L 4.1 3.8   CHLORIDE mmol/L 100 100   CO2 mmol/L 29.0 29.0   BUN mg/dL 9 9   CREATININE mg/dL 0.46* 0.44*   GLUCOSE mg/dL 94 124*   CALCIUM mg/dL 8.9 8.8   ALT (SGPT) U/L <5  --      Culture Results:   Wound Culture   Date Value Ref Range Status   03/01/2021 Scant growth (1+) Mixed Gram Negative Selina (A)  Preliminary     Gram stain from body fluid culture-right psoas aspirate completed yesterday:  Body Fluid Culture Abnormal Stain              Gram Stain  Many (4+) WBCs seen      Rare (1+) Gram positive cocci           Radiology: "   CT guided right psoas aspirate completed yesterday:  IMPRESSION:  1.. The right psoas abscess has shown diminishment in size from the  previous CT exam. The upper margin of the collection is no longer well  demonstrated and there is some mild overall diminishment in size of the  right psoas muscle in comparison with the left. Given the clinical  situation in which the patient is to undergo surgery it was elected to  attempt aspiration with removal of as much of the material as possible.  I was subsequently able to remove proximally 20 cc of frankly purulent  material from the right psoas muscle collection. This was sent to the  laboratory for further evaluation as requested. There were no immediate  complications.  This report was finalized on 03/02/2021 14:46 by Dr. Neymar Calderon MD.    Additional Studies Reviewed: None    Impression:   1.  Methicillin susceptible staph aureus bacteremia  2.  Postoperative infection right hip following gluteus repair  3.  Right psoas abscess  4.  Possible discitis/osteomyelitis L1/L2.  5.  Cervical stenosis-she is going to undergo surgery today.  6.  Rheumatoid arthritis    Recommendations:   Continue cefazolin  Based on Gram stain of psoas abscess, we should be treating the appropriate pathogen.  Previously she had had MSSA recovered from blood.  She is going to have surgery for cervical spine stenosis today.  Continue supportive care  Continue to follow  She will need follow-up imaging of her lumbar spine likely with MRI in 1 to 2 weeks to make sure no residual/recurrent abscess.  May need additional imaging sooner depending upon clinical course and symptoms.  Continue to follow with [facility] attending and neurosurgery.    Elie Ohara MD

## 2021-03-03 NOTE — ANESTHESIA PREPROCEDURE EVALUATION
Anesthesia Evaluation     Patient summary reviewed and Nursing notes reviewed   no history of anesthetic complications:  NPO Solid Status: > 8 hours  NPO Liquid Status: > 8 hours           Airway   Mallampati: I  TM distance: >3 FB  Neck ROM: full  No difficulty expected  Dental - normal exam     Pulmonary - normal exam   (+) asthma,  Cardiovascular - normal exam  Exercise tolerance: poor (<4 METS)    Patient on routine beta blocker and Beta blocker given within 24 hours of surgery    (+) pacemaker pacemaker, hypertension well controlled 2 medications or greater, past MI  >12 months, CAD, cardiac stents (2 stents, most recent 6 years ago) more than 12 months ago hyperlipidemia,     ROS comment: [employer] interrogation report reviewed from 2/9/21  15% a paced    Aspirin/plavix held 1 week ago,  Last dose xarelto > 4 days    Echo 2/10/21  · Estimated left ventricular EF = 65% Left ventricular systolic function is normal.  · Normal right ventricular cavity size and systolic function noted.  · All valves appear structurally and functionally normal with no evidence of any hemodynamically significant disease.  · There is no evidence of right to left shunt on bubble study.  · There is no evidence of intracardiac mass or thrombus.       ESTEFANI done to eval for valve vegetations which showed none    Neuro/Psych  (+) headaches, syncope, numbness,     GI/Hepatic/Renal/Endo    (+) obesity, morbid obesity,  thyroid problem hypothyroidism  (-) liver disease, no renal disease, diabetes    Musculoskeletal     (+) gait problem,       ROS comment: Right hip/leg weakness, s/p glut med repair  Post op she had syncopal episodes x 2, was admitted with wound infection/sepsis  Now diagnosed with cervical stenosis, myelopathy, lumbar discitis  Abdominal  - normal exam   Substance History - negative use     OB/GYN          Other   arthritis (rheumatoid arthritis, prednisone 5 mg daily),                        Anesthesia Plan    ASA 3 -  emergent     general   (Interrogate device in recovery    Discussed with Dr Shea. Pt is extremely myelopathic and unable to mobilize until the corpectomy is performed. )  intravenous induction     Anesthetic plan, all risks, benefits, and alternatives have been provided, discussed and informed consent has been obtained with: patient.  Use of blood products discussed with patient .   Plan discussed with CRNA.

## 2021-03-03 NOTE — PROGRESS NOTES
NEUROSURGERY DAILY PROGRESS NOTE    ASSESSMENT:   Tawny Shin is a 65 y.o. with a significant comorbidity rheumatoid arthritis on disease modifying agents, prior lumbar fusion by Dr. Oropeza in 2018.  She presents with a new problem of postop surgical infection of her right hip with wound VAC dressing, progressive right lower extremity proximal weakness without numbness or radiculopathy. Physical exam findings of right iliopsoas weakness, and global hyperreflexia with Babinski and right Patience's and decreased  strength bilaterally.  Their imaging shows CT of the cervical spine with severe cervical stenosis at severe stenosis C5-6 and C6/7 and adjacent segment disease at L1/2 and L2/3 with concern for osteomyelitis discitis.    Past Medical History:   Diagnosis Date   • Arthritis    • Asthma    • Chronic sinusitis    • Coronary artery disease    • Eustachian tube dysfunction    • Heart disease    • Herpes simplex    • Hyperlipidemia    • Hypertension    • Knee dislocation    • Labral tear of right hip joint    • Laryngitis sicca    • Laryngitis, chronic    • MI (myocardial infarction) (CMS/HCC)    • Otorrhea    • Sensorineural hearing loss    • Sjogren's disease (CMS/HCC)      Active Hospital Problems    Diagnosis   • Stenosis of cervical spine with myelopathy (CMS/HCC)     Added automatically from request for surgery [id number]     • Spinal stenosis, lumbar region, with neurogenic claudication     Added automatically from request for surgery [id number]     • Discitis of lumbar region     Added automatically from request for surgery [id number]     • Osteomyelitis of lumbar spine (CMS/HCC)     Added automatically from request for surgery [id number]       HPI: Appears to be resting comfortably.  Continues to complain of right lower lumbar/posterior hip pain.   Right groin pain and anterior thigh numbness and tingling is currently resolved.  No acute events overnight.    PLAN:   Neuro: Stable.  Oriented x3.  Gross  hyperreflexia, right Babinski's and Mims, 2-3 beats of clonus noted bilaterally, right IP and quadricep weakness 2/5    History of a prior L3-L5 fusion  Adjacent segment disease secondary to above  Right psoas abscess  Discitis/osteomyelitis L1 to  L1/2 severe central canal stenosis  Right lower extremity weakness     Continue neurochecks every 4 hours   MRI cervical spine shows severe cervical stenosis at severe stenosis C5-6 and C6/7.   Repeat MRI of the lumbar spine with and without contrast reviewed, appears to show increased her signal change within the L1-2 endplates and disc space, increase stenosis at L1-2, psoas abscess appears slightly larger at the L4 level.      I&D right psoas abscess performed per IR (3/2/2021).  Approximately 20 mL removed   Body fluid culture: 4+ WBCs, rare 1+ gram-positive cocci    Dr. Shea discussed treatment options such as conservative management with watchful waiting with close observation/ follow-up, as well as proceeding with an elective surgical intervention (C6 corpectomy with titanium cage and neuro monitoring followed by a L1 laminectomy with possible discectomy, foraminotomy, and debridement).   Dr. Shea discussed and explained the procedure(s), as well as the surgical risk, benefits, and recovery time.   Ms. Shin and family verbally acknowledged their understanding of the surgical benefits, as well and the potential surgical risks and complications of the procedure(s) to include, but not limited to infection (less than 1%), bleeding, damage to local structures, perforation of the pharynx, esophageal and/or trachea.  Vocal cord paresis from damage to the recurrent laryngeal nerve (11% temporary, 4% permanent), vertebral artery injury due to thrombosis or laceration 0.3% incidence.  Carotid injury through thrombosis or occlusion or laceration.  Lubna syndrome due to sympathetic plexus injury.  Thoracic duct injury.  Failure of fusion to to 20%, graft  "extrusion 2%, failure of hardware due to fracture, wound infection less than 1%, adjacent level degeneration (which is controversial)Injury to the nerves or thecal sac resulting in CSF leak, weakness, numbness, paralysis, or loss of bowel, and bladder function, instrumentation failure, dysphagia, dysphonia, visual loss, stroke, coma, MI, or death.  After considering options, Ms. Shin and family have requesting that we proceed with the elective procedure (C6 corpectomy with titanium cage and with neuro monitoring).    Ms. Shin and family have elected to postpone the L1 laminectomy for now.  Neurosurgery will continue to monitor her lumbar spine and need for potential lumbar surgery in the future.    CV: No acute issues.   Pulm: No acute issues.  Maintaining O2 sat.   : No acute issues  FEN: NPO since midnight  GI: Prophylaxis  ID: MSSA bacteria.  Continue cefazolin per ID.  Covid swab today.  Heme:  DVT prophylaxis held for operative procedure; SCD's   H/H 7.7/23.9.  2 units of packed red blood cells held for OR   Repeat INR 1.25 post 2 units of FFP   Platelets 254  Pain: Tolerable at present  Dispo: PT/OT.       CHIEF COMPLAINT:   Right-sided lumbar back pain with a new complaint of right anterior thigh pain    Subjective  Symptom stable.       Objective:  General Appearance:  Comfortable, well-appearing and in no acute distress.    Vital signs: (most recent): Height 170.2 cm (67.01\"), weight 102 kg (224 lb), not currently breastfeeding.      Neurologic Exam     Mental Status   Oriented to person, place, and time.   Attention: normal. Concentration: normal.   Speech: speech is normal   Level of consciousness: alert    Bright and awake.  Oriented x3.  Follows commands without prompting showing thumbs up into fingers bilaterally.     Cranial Nerves     CN II   Visual fields full to confrontation.     CN III, IV, VI   Pupils are equal, round, and reactive to light.  Extraocular motions are normal.     CN V "   Facial sensation intact.     CN VII   Facial expression full, symmetric.     CN VIII   CN VIII normal.     CN IX, X   CN IX normal.     CN XI   CN XI normal.     Motor Exam     Strength   Right deltoid: 5/5  Left deltoid: 5/5  Right biceps: 5/5  Left biceps: 5/5  Right triceps: 5/5  Left triceps: 5/5  Right wrist extension: 5/5  Left wrist extension: 5/5  Right iliopsoas: 2/5  Left iliopsoas: 5/5  Right quadriceps: 2/5  Left quadriceps: 5/5  Right anterior tibial: 3/5  Left anterior tibial: 5/5  Right gastroc: 4/5  Left gastroc: 5/5       Sensory Exam   Sensory deficit distribution on right: L1    Gait, Coordination, and Reflexes     Tremor   Resting tremor: absent  Intention tremor: absent  Action tremor: absent    Reflexes   Right plantar: upgoing  Left plantar: normal  Right Mims: present  Left Mims: absent  Right ankle clonus: present  Left ankle clonus: present  Right pendular knee jerk: absent  Left pendular knee jerk: absent    Drains: * No LDAs found *    Imaging Results (Last 24 Hours)     Procedure Component Value Units Date/Time    CT Guided Biopsy Aspiration Injection [[id number]] Collected: 03/02/21 1437     Updated: 03/02/21 1450    Narrative:      EXAMINATION: CT guided aspiration 3/2/2021     DOSE: 963 mGycm. Automated exposure control was utilized to diminish  patient radiation dose..     HISTORY: Right psoas abscess.     FINDINGS: Multiple contiguous axials are obtained through the abdomen  and upper pelvis per protocol without intravenous or oral contrast  administration. There is persistent hypodensity within the right psoas  muscle with persistent mild enlargement of the right psoas in comparison  with the contralateral psoas muscle. This fluid collection does show  significant diminishment in size when compared to the previous CT  examination dated 2/26/2021. The upper aspect of the abscess appears to  have resolved with previously noted air and fluid having resolved. The  findings are  reviewed with Dr. Elie Ohara of the infectious disease  service. Given that there is some residual collection present it was  felt that it would benefit the patient to aspirate as much of this fluid  as possible. Given the small size of the collection drainage catheter  placement would likely be difficult.     The risks and benefits of the procedure were discussed with the patient  thoroughly. A signed consent form was on the chart at the time of the  procedure. The patient is placed in the left lateral decubitus position  and markers placed over the posterior skin in an attempt to localize the  collection. I subsequently advanced a 6 [place] M/A-COM catheter from a  posterior approach and following confirmation that the tip of the  catheter was within the collection the inner trocar was removed. I  subsequently aspirated approximately 20 cc of frankly purulent fluid  from the psoas collection. Once no additional fluid could be aspirated  from the collection I removed the catheter and maintained pressure on  the puncture site until obtaining hemostasis. The patient tolerated the  procedure well. All 20 cc of the radial and material was placed into 2  sterile test tubes and sent to the laboratory for further culture and  evaluation as requested by Dr. Ohara.       Impression:      1.. The right psoas abscess has shown diminishment in size from the  previous CT exam. The upper margin of the collection is no longer well  demonstrated and there is some mild overall diminishment in size of the  right psoas muscle in comparison with the left. Given the clinical  situation in which the patient is to undergo surgery it was elected to  attempt aspiration with removal of as much of the material as possible.  I was subsequently able to remove proximally 20 cc of frankly purulent  material from the right psoas muscle collection. This was sent to the  laboratory for further evaluation as requested. There were no  immediate  complications.  This report was finalized on 03/02/2021 14:46 by Dr. Neymar Calderon MD.        Lab Results (last 24 hours)     Procedure Component Value Units Date/Time    Body Fluid Culture - Body Fluid, Back, Lower [[id number]] Collected: 03/02/21 1515    Specimen: Body Fluid from Back, Lower Updated: 03/03/21 0600     Body Fluid Culture Abnormal Stain     Gram Stain Many (4+) WBCs seen      Rare (1+) Gram positive cocci    C-reactive Protein [[id number]]  (Abnormal) Collected: 03/03/21 0454    Specimen: Blood Updated: 03/03/21 0600     C-Reactive Protein 11.77 mg/dL     Protime-INR [[id number]]  (Abnormal) Collected: 03/03/21 0455    Specimen: Blood Updated: 03/03/21 0541     Protime 15.3 Seconds      INR 1.25    CBC & Differential [[id number]]  (Abnormal) Collected: 03/03/21 0454    Specimen: Blood Updated: 03/03/21 0534    Narrative:      The following orders were created for panel order CBC & Differential.  Procedure                               Abnormality         Status                     ---------                               -----------         ------                     CBC Auto Differential[[id number]]        Abnormal            Final result                 Please view results for these tests on the individual orders.    CBC Auto Differential [[id number]]  (Abnormal) Collected: 03/03/21 0454    Specimen: Blood Updated: 03/03/21 0534     WBC 5.29 10*3/mm3      RBC 2.71 10*6/mm3      Hemoglobin 7.7 g/dL      Hematocrit 23.9 %      MCV 88.2 fL      MCH 28.4 pg      MCHC 32.2 g/dL      RDW 15.2 %      RDW-SD 48.7 fl      MPV 9.8 fL      Platelets 254 10*3/mm3      Neutrophil % 50.7 %      Lymphocyte % 28.9 %      Monocyte % 14.9 %      Eosinophil % 3.6 %      Basophil % 0.8 %      Immature Grans % 1.1 %      Neutrophils, Absolute 2.68 10*3/mm3      Lymphocytes, Absolute 1.53 10*3/mm3      Monocytes, Absolute 0.79 10*3/mm3      Eosinophils, Absolute 0.19 10*3/mm3      Basophils, Absolute 0.04  10*3/mm3      Immature Grans, Absolute 0.06 10*3/mm3      nRBC 0.0 /100 WBC     KOH Prep - Aspirate, Back, Lower [[id number]] Collected: 03/02/21 1515    Specimen: Aspirate from Back, Lower Updated: 03/02/21 1838     KOH Prep No yeast or hyphal elements seen    Anaerobic Culture - Aspirate, Back, Lower [[id number]] Collected: 03/02/21 1515    Specimen: Aspirate from Back, Lower Updated: 03/02/21 1521    Fungus Culture - Aspirate, Back, Lower [[id number]] Collected: 03/02/21 1515    Specimen: Aspirate from Back, Lower Updated: 03/02/21 1521    AFB Culture - Aspirate, Back, Lower [[id number]] Collected: 03/02/21 1515    Specimen: Aspirate from Back, Lower Updated: 03/02/21 1521    Wound Culture - Wound, Hip, Right [[id number]]  (Abnormal) Collected: 03/01/21 1001    Specimen: Wound from Hip, Right Updated: 03/02/21 1155     Wound Culture Scant growth (1+) Mixed Gram Negative Selina     Gram Stain Few (2+) WBCs seen      No organisms seen    Narrative:      Organism under investigation.      COVID-19, [facility] IN-HOUSE,NASAL Swab (NO TRANSPORT MEDIA) 2 HR TAT - Swab, Nasopharynx [[id number]]  (Normal) Collected: 03/02/21 1100    Specimen: Swab from Nasopharynx Updated: 03/02/21 1134     COVID19 Presumptive Negative    Narrative:      Fact sheet for providers: https://www.fda.gov/media/192504/download     Fact sheet for patients: https://www.fda.gov/media/375684/download    Test performed by PCR.  If inconsistent with clinical signs and symptoms patient should be tested with different authorized molecular test.        DANIELA Velazquez

## 2021-03-03 NOTE — ANESTHESIA PROCEDURE NOTES
Arterial Line      Patient location during procedure: holding area  Start time: 3/3/2021 1:01 PM   Line placed for hemodynamic monitoring.  Performed By   CRNA: Nino Samyaoa CRNA  Preanesthetic Checklist  Completed: patient identified, site marked, surgical consent, pre-op evaluation, timeout performed, IV checked, risks and benefits discussed and monitors and equipment checked  Arterial Line Prep   Sterile Tech: cap, gloves, gown and mask  Prep: ChloraPrep  Patient monitoring: blood pressure monitoring, continuous pulse oximetry and EKG  Arterial Line Procedure   Laterality:right  Location:  radial artery  Catheter size: 22 G   Guidance: ultrasound guided and palpation technique  Number of attempts: 1  Successful placement: yes  Post Assessment   Dressing Type: occlusive dressing applied.   Complications no  Circ/Move/Sens Assessment: normal.   Patient Tolerance: patient tolerated the procedure well with no apparent complications

## 2021-03-03 NOTE — ANESTHESIA PROCEDURE NOTES
Airway  Urgency: elective    Date/Time: 3/3/2021 1:44 PM  Airway not difficult    General Information and Staff    Patient location during procedure: OR  CRNA: Nino Samayoa CRNA    Indications and Patient Condition  Indications for airway management: airway protection    Preoxygenated: yes  MILS maintained throughout  Mask difficulty assessment: 0 - not attempted    Final Airway Details  Final airway type: endotracheal airway      Successful airway: ETT  Cuffed: yes   Successful intubation technique: video laryngoscopy  Facilitating devices/methods: intubating stylet  Endotracheal tube insertion site: oral  Blade: Sky  Blade size: 3  ETT size (mm): 7.0  Cormack-Lehane Classification: grade I - full view of glottis  Placement verified by: chest auscultation and capnometry   Cuff volume (mL): 5  Measured from: gums  ETT/EBT to gums (cm): 21  Number of attempts at approach: 1  Assessment: lips, teeth, and gum same as pre-op and atraumatic intubation    Additional Comments  Dl with sky per r detpatelan srna. No neck flexion or extension during procedure maintained inline PSR

## 2021-03-04 ENCOUNTER — APPOINTMENT (OUTPATIENT)
Dept: GENERAL RADIOLOGY | Facility: HOSPITAL | Age: 66
End: 2021-03-04

## 2021-03-04 LAB
ANION GAP SERPL CALCULATED.3IONS-SCNC: 8 MMOL/L (ref 5–15)
BASOPHILS # BLD AUTO: 0.01 10*3/MM3 (ref 0–0.2)
BASOPHILS NFR BLD AUTO: 0.3 % (ref 0–1.5)
BH BB BLOOD EXPIRATION DATE: NORMAL
BH BB BLOOD TYPE BARCODE: 6200
BH BB DISPENSE STATUS: NORMAL
BH BB PRODUCT CODE: NORMAL
BH BB UNIT NUMBER: NORMAL
BUN SERPL-MCNC: 11 MG/DL (ref 8–23)
BUN/CREAT SERPL: 31.4 (ref 7–25)
CALCIUM SPEC-SCNC: 8.9 MG/DL (ref 8.6–10.5)
CHLORIDE SERPL-SCNC: 103 MMOL/L (ref 98–107)
CO2 SERPL-SCNC: 26 MMOL/L (ref 22–29)
CREAT SERPL-MCNC: 0.35 MG/DL (ref 0.57–1)
CROSSMATCH INTERPRETATION: NORMAL
CROSSMATCH INTERPRETATION: NORMAL
CRP SERPL-MCNC: 10.52 MG/DL (ref 0–0.5)
DEPRECATED RDW RBC AUTO: 47 FL (ref 37–54)
EOSINOPHIL # BLD AUTO: 0 10*3/MM3 (ref 0–0.4)
EOSINOPHIL NFR BLD AUTO: 0 % (ref 0.3–6.2)
ERYTHROCYTE [DISTWIDTH] IN BLOOD BY AUTOMATED COUNT: 14.8 % (ref 12.3–15.4)
ERYTHROCYTE [SEDIMENTATION RATE] IN BLOOD: 68 MM/HR (ref 0–20)
GFR SERPL CREATININE-BSD FRML MDRD: >150 ML/MIN/1.73
GLUCOSE SERPL-MCNC: 145 MG/DL (ref 65–99)
HCT VFR BLD AUTO: 29.1 % (ref 34–46.6)
HGB BLD-MCNC: 9.5 G/DL (ref 12–15.9)
IMM GRANULOCYTES # BLD AUTO: 0.04 10*3/MM3 (ref 0–0.05)
IMM GRANULOCYTES NFR BLD AUTO: 1.2 % (ref 0–0.5)
LYMPHOCYTES # BLD AUTO: 0.73 10*3/MM3 (ref 0.7–3.1)
LYMPHOCYTES NFR BLD AUTO: 21.1 % (ref 19.6–45.3)
MCH RBC QN AUTO: 28.4 PG (ref 26.6–33)
MCHC RBC AUTO-ENTMCNC: 32.6 G/DL (ref 31.5–35.7)
MCV RBC AUTO: 87.1 FL (ref 79–97)
MONOCYTES # BLD AUTO: 0.08 10*3/MM3 (ref 0.1–0.9)
MONOCYTES NFR BLD AUTO: 2.3 % (ref 5–12)
NEUTROPHILS NFR BLD AUTO: 2.6 10*3/MM3 (ref 1.7–7)
NEUTROPHILS NFR BLD AUTO: 75.1 % (ref 42.7–76)
NRBC BLD AUTO-RTO: 0 /100 WBC (ref 0–0.2)
PLATELET # BLD AUTO: 221 10*3/MM3 (ref 140–450)
PMV BLD AUTO: 10.2 FL (ref 6–12)
POTASSIUM SERPL-SCNC: 4.4 MMOL/L (ref 3.5–5.2)
RBC # BLD AUTO: 3.34 10*6/MM3 (ref 3.77–5.28)
SODIUM SERPL-SCNC: 137 MMOL/L (ref 136–145)
UNIT  ABO: NORMAL
UNIT  RH: NORMAL
WBC # BLD AUTO: 3.46 10*3/MM3 (ref 3.4–10.8)

## 2021-03-04 PROCEDURE — 97535 SELF CARE MNGMENT TRAINING: CPT | Performed by: OCCUPATIONAL THERAPIST

## 2021-03-04 PROCEDURE — 25810000003 SODIUM CHLORIDE 0.9 % WITH KCL 20 MEQ 20-0.9 MEQ/L-% SOLUTION: Performed by: NURSE PRACTITIONER

## 2021-03-04 PROCEDURE — 25010000002 VANCOMYCIN 10 G RECONSTITUTED SOLUTION: Performed by: NURSE PRACTITIONER

## 2021-03-04 PROCEDURE — 85025 COMPLETE CBC W/AUTO DIFF WBC: CPT | Performed by: NURSE PRACTITIONER

## 2021-03-04 PROCEDURE — 97116 GAIT TRAINING THERAPY: CPT | Performed by: PHYSICAL THERAPIST

## 2021-03-04 PROCEDURE — 85651 RBC SED RATE NONAUTOMATED: CPT | Performed by: NURSE PRACTITIONER

## 2021-03-04 PROCEDURE — 97168 OT RE-EVAL EST PLAN CARE: CPT | Performed by: OCCUPATIONAL THERAPIST

## 2021-03-04 PROCEDURE — 25010000002 HEPARIN (PORCINE) PER 1000 UNITS: Performed by: NURSE PRACTITIONER

## 2021-03-04 PROCEDURE — 80048 BASIC METABOLIC PNL TOTAL CA: CPT | Performed by: NURSE PRACTITIONER

## 2021-03-04 PROCEDURE — 86140 C-REACTIVE PROTEIN: CPT | Performed by: NURSE PRACTITIONER

## 2021-03-04 PROCEDURE — 99024 POSTOP FOLLOW-UP VISIT: CPT | Performed by: NURSE PRACTITIONER

## 2021-03-04 PROCEDURE — 25010000002 VANCOMYCIN 10 G RECONSTITUTED SOLUTION: Performed by: NEUROLOGICAL SURGERY

## 2021-03-04 PROCEDURE — 25010000002 CEFAZOLIN PER 500 MG: Performed by: NURSE PRACTITIONER

## 2021-03-04 PROCEDURE — 97164 PT RE-EVAL EST PLAN CARE: CPT | Performed by: PHYSICAL THERAPIST

## 2021-03-04 PROCEDURE — 97530 THERAPEUTIC ACTIVITIES: CPT | Performed by: PHYSICAL THERAPIST

## 2021-03-04 PROCEDURE — 72040 X-RAY EXAM NECK SPINE 2-3 VW: CPT

## 2021-03-04 RX ORDER — OXYCODONE AND ACETAMINOPHEN 10; 325 MG/1; MG/1
1 TABLET ORAL EVERY 4 HOURS PRN
Status: DISCONTINUED | OUTPATIENT
Start: 2021-03-04 | End: 2021-03-06 | Stop reason: HOSPADM

## 2021-03-04 RX ORDER — OXYCODONE AND ACETAMINOPHEN 7.5; 325 MG/1; MG/1
1 TABLET ORAL EVERY 4 HOURS PRN
Status: DISCONTINUED | OUTPATIENT
Start: 2021-03-04 | End: 2021-03-06 | Stop reason: HOSPADM

## 2021-03-04 RX ADMIN — HEPARIN SODIUM 5000 UNITS: 5000 INJECTION INTRAVENOUS; SUBCUTANEOUS at 22:48

## 2021-03-04 RX ADMIN — CARVEDILOL 25 MG: 25 TABLET, FILM COATED ORAL at 08:13

## 2021-03-04 RX ADMIN — SODIUM CHLORIDE, PRESERVATIVE FREE 10 ML: 5 INJECTION INTRAVENOUS at 08:13

## 2021-03-04 RX ADMIN — GUAIFENESIN 600 MG: 600 TABLET, EXTENDED RELEASE ORAL at 08:13

## 2021-03-04 RX ADMIN — PANTOPRAZOLE SODIUM 40 MG: 40 TABLET, DELAYED RELEASE ORAL at 05:13

## 2021-03-04 RX ADMIN — PREDNISONE 5 MG: 5 TABLET ORAL at 08:13

## 2021-03-04 RX ADMIN — VALACYCLOVIR 500 MG: 500 TABLET, FILM COATED ORAL at 08:13

## 2021-03-04 RX ADMIN — POTASSIUM CHLORIDE AND SODIUM CHLORIDE 100 ML/HR: 900; 150 INJECTION, SOLUTION INTRAVENOUS at 17:36

## 2021-03-04 RX ADMIN — LEVOTHYROXINE SODIUM 75 MCG: 25 TABLET ORAL at 05:13

## 2021-03-04 RX ADMIN — OXYCODONE HYDROCHLORIDE AND ACETAMINOPHEN 500 MG: 500 TABLET ORAL at 08:13

## 2021-03-04 RX ADMIN — HEPARIN SODIUM 5000 UNITS: 5000 INJECTION INTRAVENOUS; SUBCUTANEOUS at 05:13

## 2021-03-04 RX ADMIN — GUAIFENESIN 600 MG: 600 TABLET, EXTENDED RELEASE ORAL at 22:22

## 2021-03-04 RX ADMIN — OXYCODONE HYDROCHLORIDE AND ACETAMINOPHEN 1 TABLET: 7.5; 325 TABLET ORAL at 23:08

## 2021-03-04 RX ADMIN — FOLIC ACID 1 MG: 1 TABLET ORAL at 08:13

## 2021-03-04 RX ADMIN — CEFAZOLIN SODIUM 2 G: 10 INJECTION, POWDER, FOR SOLUTION INTRAVENOUS at 13:20

## 2021-03-04 RX ADMIN — OXYCODONE HYDROCHLORIDE AND ACETAMINOPHEN 1 TABLET: 7.5; 325 TABLET ORAL at 10:16

## 2021-03-04 RX ADMIN — CEFAZOLIN SODIUM 2 G: 10 INJECTION, POWDER, FOR SOLUTION INTRAVENOUS at 22:24

## 2021-03-04 RX ADMIN — POLYETHYLENE GLYCOL 3350 17 G: 17 POWDER, FOR SOLUTION ORAL at 08:13

## 2021-03-04 RX ADMIN — DOCUSATE SODIUM 100 MG: 100 CAPSULE ORAL at 08:13

## 2021-03-04 RX ADMIN — ISOSORBIDE MONONITRATE 60 MG: 30 TABLET, EXTENDED RELEASE ORAL at 08:13

## 2021-03-04 RX ADMIN — VANCOMYCIN HYDROCHLORIDE 1250 MG: 10 INJECTION, POWDER, LYOPHILIZED, FOR SOLUTION INTRAVENOUS at 17:36

## 2021-03-04 RX ADMIN — MULTIPLE VITAMINS W/ MINERALS TAB 1 TABLET: TAB at 08:13

## 2021-03-04 RX ADMIN — GABAPENTIN 100 MG: 100 CAPSULE ORAL at 08:14

## 2021-03-04 RX ADMIN — OXYCODONE HYDROCHLORIDE AND ACETAMINOPHEN 1 TABLET: 7.5; 325 TABLET ORAL at 14:36

## 2021-03-04 RX ADMIN — CARVEDILOL 25 MG: 25 TABLET, FILM COATED ORAL at 17:36

## 2021-03-04 RX ADMIN — VANCOMYCIN HYDROCHLORIDE 1250 MG: 10 INJECTION, POWDER, LYOPHILIZED, FOR SOLUTION INTRAVENOUS at 05:10

## 2021-03-04 RX ADMIN — Medication 250 MG: at 08:13

## 2021-03-04 RX ADMIN — DOCUSATE SODIUM 100 MG: 100 CAPSULE ORAL at 22:23

## 2021-03-04 RX ADMIN — ISOSORBIDE MONONITRATE 60 MG: 30 TABLET, EXTENDED RELEASE ORAL at 17:36

## 2021-03-04 RX ADMIN — OXYCODONE HYDROCHLORIDE AND ACETAMINOPHEN 500 MG: 500 TABLET ORAL at 22:23

## 2021-03-04 RX ADMIN — CEFAZOLIN SODIUM 2 G: 10 INJECTION, POWDER, FOR SOLUTION INTRAVENOUS at 06:39

## 2021-03-04 RX ADMIN — Medication 220 MG: at 17:36

## 2021-03-04 RX ADMIN — OXYCODONE HYDROCHLORIDE AND ACETAMINOPHEN 1 TABLET: 10; 325 TABLET ORAL at 18:42

## 2021-03-04 RX ADMIN — SODIUM CHLORIDE, PRESERVATIVE FREE 10 ML: 5 INJECTION INTRAVENOUS at 22:25

## 2021-03-04 RX ADMIN — DOCUSATE SODIUM 50 MG AND SENNOSIDES 8.6 MG 2 TABLET: 8.6; 5 TABLET, FILM COATED ORAL at 08:13

## 2021-03-04 RX ADMIN — Medication 250 MG: at 22:22

## 2021-03-04 RX ADMIN — POTASSIUM CHLORIDE AND SODIUM CHLORIDE 100 ML/HR: 900; 150 INJECTION, SOLUTION INTRAVENOUS at 10:53

## 2021-03-04 RX ADMIN — GABAPENTIN 100 MG: 100 CAPSULE ORAL at 22:22

## 2021-03-04 NOTE — H&P
ARGELIA Guerrero APRN          Internal Medicine History and Physical      Name: Tawny Shin  MRN: [id number]     Acct: [id number]  Room: [facility]    Admit Date: 2/12/2021  PCP: Davon Urrutia MD    Chief Complaint:     Neck pain, weakness    History Obtained From:     chart review and the patient    History of Present Illness:      Tawny Shin is a  65 y.o.  female who presents with neck pain s/p cervical decompression. The patient had been in her usual state of health when she developed a torn right gluteal muscle. She underwent surgical repair on 1/14/21and post-operative course was progressing. At her 4 week follow up, she developed increased bloody drainage from the wound with erythema. She presented for injection due to RA at which time she suffered a syncopal event. This was felt to be a vasovagal response and she returned to home. She was found later by a family member in the floor for an unknown length of time and the dressing to the right hip/gluteal wound was saturated.  She presented to the hospital and was noted to be febrile on intake. Workup revealed an elevated CPK and d dimer. Wound cultures were obtained. The patient was admitted to the service of the hospitalists at that time. She underwent pacemaker interrogation in workup for her syncopal episodes which detected no abnormalities. MRI brain negative for acute process. Purulent drainage from the wound was cultured and the patient was started on broad spectrum antibiotics. She was seen in consultation by orthopedics. Blood cultures returned positive for MSSA in all bottles. ID was consulted for antibiotic management at that time. She underwent debridement of the wound per ortho on 2/9. Post-operatively, the wound measures 20 cm x 6 cm and wound vac therapy was initiated. Surveillance cultures remained positive and surgical wound cultures positive for e. Coli. She continued with antibiotic  therapy with Azactam under direction of ID. Due to persistent bacteremia, she underwent ESTEFANI which did not reveal any valvular vegetations. Due to her need for continued IV antibiotic therapy, wound care and rehabilitation, she transferred to the [facility]. She was seen in consultation by our wound care team and was followed by ID. She developed increased complaints of back pain and imaging revealed a right psoas abscess as well as possible L1-2 osteomyelitis discitis. Inflammatory markers were also elevated. The patient had also complained of increased weakness and incoordination to bilateral upper extremities. Neurosurgery was consulted and recommended cervical decompression as well as continued antibiotic therapy with plans for extension of lumbar fusion. She transferred to [facility] on 3/3 and underwent cervical corpectomy and decompression. She tolerated the procedure without difficulty and today, reports decreased pain and some increased  strength.     Past Medical History:     Past Medical History:   Diagnosis Date   • Arthritis    • Asthma    • Chronic sinusitis    • Coronary artery disease    • Eustachian tube dysfunction    • Heart disease    • Herpes simplex    • Hyperlipidemia    • Hypertension    • Knee dislocation    • Labral tear of right hip joint    • Laryngitis sicca    • Laryngitis, chronic    • MI (myocardial infarction) (CMS/HCC)    • Otorrhea    • Sensorineural hearing loss    • Sjogren's disease (CMS/HCC)         Past Surgical History:     Past Surgical History:   Procedure Laterality Date   • A-V CARDIAC PACEMAKER INSERTION  2016   • ATRIAL CARDIAC PACEMAKER INSERTION     • CARDIAC CATHETERIZATION     • CATARACT EXTRACTION     • CERVICAL CORPECTOMY N/A 3/3/2021    Procedure: CERVICAL 6 CORPECTOMY WITH TITANIUM CAGE WITH NEURO MONITORING;  Surgeon: Bandar Shea MD;  Location: [facility];  Service: Neurosurgery;  Laterality: N/A;   • COLONOSCOPY  11/08/2011    One fold in the ascending colon  which showed ulcer otherwise normal exam   • COLONOSCOPY  11/12/2004    Normal exam repeat in five years   • CORONARY ANGIOPLASTY WITH STENT PLACEMENT      X 2; 2013 & 2014   • ENDOSCOPY  07/10/2014    Normal exam   • HIP ABDUCTION TENOTOMY BILATERAL Right 1/14/2021    Procedure: RIGHT HIP GLUTEUS MEDLUS / MINIMUS REPAIR, POSSIBLE ACHILLES ALLOGRAFT;  Surgeon: Nino Carlson MD;  Location:  [facility];  Service: Orthopedics;  Laterality: Right;   • INCISION AND DRAINAGE HIP Right 2/9/2021    Procedure: HIP INCISION AND DRAINAGE;  Surgeon: Nino Carlson MD;  Location:  [facility];  Service: Orthopedics;  Laterality: Right;   • JOINT REPLACEMENT     • LUMBAR FUSION N/A 1/19/2018    Procedure: L3-4,L4-5 DECOMPRESSION, POSTERIOR SPINAL FUSION WITH INSTRUMENTATION;  Surgeon: Fortino Oropeza MD;  Location:  [facility];  Service:    • LUMBAR LAMINECTOMY WITH FUSION Left 1/17/2018    Procedure: LEFT L3-4 L4-5 LATERAL LUMBAR INTERBODY FUSION;  Surgeon: Fortino Oropeza MD;  Location:  [facility];  Service:    • MYRINGOTOMY W/ TUBES  09/04/2014    TUBES NO LONGER IN PLACE   • OTHER SURGICAL HISTORY      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 so hardware was removed and spacers were placed   • REPLACEMENT TOTAL KNEE Right         Medications Prior to Admission:       Prior to Admission medications    Medication Sig Start Date End Date Taking? Authorizing Provider   aspirin 81 MG EC tablet Take 81 mg by mouth Daily.    Leila Daly MD   carvedilol (COREG) 12.5 MG tablet Take 1 tablet by mouth 2 (Two) Times a Day With Meals. 1/29/21   Omar Hernandez MD   ceFAZolin in 0.9% normal saline (ANCEF) 2 GM/ 100 mL solution IVPB Infuse 100 mL into a venous catheter Every 8 (Eight) Hours for 81 doses. Indications: Bacteria in the Blood 2/12/21 3/11/21  Moises Oliveira MD   clopidogrel (PLAVIX) 75 MG tablet Take 75 mg by mouth Daily. 10/11/20   Leila Daly MD   folic acid (FOLVITE) 1 MG tablet TAKE 1 TABLET BY MOUTH DAILY  5/7/20   Davon Urrutia MD   furosemide (LASIX) 40 MG tablet Take 40 mg by mouth Daily.    Leila Daly MD   guaiFENesin (MUCINEX) 600 MG 12 hr tablet Take  by mouth.    Leila Daly MD   isosorbide mononitrate (IMDUR) 60 MG 24 hr tablet Take 1 tablet by mouth 2 (Two) Times a Day. 4/13/20   Omar Hernandez MD   levothyroxine (SYNTHROID, LEVOTHROID) 75 MCG tablet Daily. 9/3/20   Leila Daly MD   MULTIPLE VITAMIN PO Take 1 tablet by mouth daily.      Leila Daly MD   nitroglycerin (NITROSTAT) 0.4 MG SL tablet Place 1 tablet under the tongue every 5 minutes as needed for Chest pain 3/9/16   Leila Daly MD   ondansetron (ZOFRAN) 4 MG tablet Take 1 tablet by mouth Every 6 (Six) Hours As Needed for Nausea or Vomiting. 2/11/21   Moises Oliveira MD   pantoprazole (PROTONIX) 40 MG EC tablet TAKE 1 TABLET BY MOUTH DAILY 5/14/20   Davon Urrutia MD   predniSONE (DELTASONE) 1 MG tablet Take 2 tablets by mouth Daily. 2/13/21   Moises Oliveira MD   salsalate (DISALCID) 500 MG tablet TAKE 5 TABLETS ON MONDAY, WEDNESDAY, FRIDAY, AND SATURDAY, AND TAKE 4 TABLETS ON TUESDAYS, THURSDAYS, AND SUNDAYS 11/17/20   Davon Urrutia MD   vitamin D (ERGOCALCIFEROL) 1.25 MG (24262 UT) capsule capsule Take 1 capsule by mouth Every 7 (Seven) Days. 12/14/20   Davon Urrutia MD        Allergies:       Atorvastatin, Amoxicillin, Escitalopram, Nabumetone, Niacin er, Penicillins, and Simvastatin    Social History:     Tobacco:    reports that she has never smoked. She has never used smokeless tobacco.  Alcohol:      reports no history of alcohol use.  Drug Use:  reports no history of drug use.    Family History:     Family History   Problem Relation Age of Onset   • Cancer Mother    • Heart disease Father        Review of Systems:     Review of Systems   Constitution: Positive for malaise/fatigue. Negative for chills, decreased appetite, weight gain and weight loss.   HENT: Negative  "for congestion, ear discharge, hoarse voice and tinnitus.    Eyes: Negative for blurred vision, discharge, visual disturbance and visual halos.   Cardiovascular: Negative for chest pain, claudication, dyspnea on exertion, irregular heartbeat, leg swelling, orthopnea and paroxysmal nocturnal dyspnea.   Respiratory: Negative for cough, shortness of breath, sputum production and wheezing.    Endocrine: Negative for cold intolerance, heat intolerance and polyuria.   Hematologic/Lymphatic: Negative for adenopathy. Does not bruise/bleed easily.   Skin: Positive for poor wound healing. Negative for dry skin, itching and suspicious lesions.   Musculoskeletal: Positive for back pain, myalgias and neck pain. Negative for arthritis, falls, joint pain and muscle weakness.   Gastrointestinal: Negative for abdominal pain, constipation, diarrhea, dysphagia and hematemesis.   Genitourinary: Negative for bladder incontinence, dysuria and frequency.   Neurological: Negative for aphonia, disturbances in coordination, dizziness and weakness.   Psychiatric/Behavioral: Negative for altered mental status, depression, memory loss and substance abuse. The patient does not have insomnia and is not nervous/anxious.        Code Status:    Code Status and Medical Interventions:   Ordered at: 21 1744     Level Of Support Discussed With:    Patient    Next of Kin (If No Surrogate)     Code Status:    CPR     Medical Interventions (Level of Support Prior to Arrest):    Full       Physical Exam:     Vitals:  /62   Pulse 65   Temp 97.6 °F (36.4 °C) (Oral)   Resp 21   Ht 165.1 cm (65\")   Wt 98.2 kg (216 lb 7.9 oz)   SpO2 95%   Breastfeeding No   BMI 36.03 kg/m²   Temp (24hrs), Av °F (36.7 °C), Min:97.6 °F (36.4 °C), Max:98.4 °F (36.9 °C)    Physical Exam  Vitals signs and nursing note reviewed.   Constitutional:       Appearance: Normal appearance. She is normal weight.   HENT:      Head: Normocephalic and atraumatic.      " Right Ear: External ear normal.      Left Ear: External ear normal.      Nose: Nose normal.      Mouth/Throat:      Mouth: Mucous membranes are moist.      Pharynx: Oropharynx is clear.   Eyes:      Extraocular Movements: Extraocular movements intact.      Conjunctiva/sclera: Conjunctivae normal.      Pupils: Pupils are equal, round, and reactive to light.   Neck:      Comments: Dressing c/d/i  Cervical collar in place  Cardiovascular:      Rate and Rhythm: Normal rate and regular rhythm.   Pulmonary:      Effort: Pulmonary effort is normal.      Breath sounds: Normal breath sounds.   Abdominal:      General: Bowel sounds are normal.      Palpations: Abdomen is soft.   Musculoskeletal:      Comments: Generalized weakness   Skin:     Comments: Wound vac intact   Neurological:      General: No focal deficit present.      Mental Status: She is oriented to person, place, and time.   Psychiatric:         Mood and Affect: Mood normal.         Behavior: Behavior normal.               Data:     Lab Results (last 7 days)     Procedure Component Value Units Date/Time    Body Fluid Culture - Body Fluid, Back, Lower [[id number]]  (Abnormal) Collected: 03/02/21 1515    Specimen: Body Fluid from Back, Lower Updated: 03/04/21 0750     Body Fluid Culture Light growth (2+) Staphylococcus aureus     Gram Stain Many (4+) WBCs seen      Rare (1+) Gram positive cocci    Wound Culture - Wound, Hip, Right [[id number]]  (Abnormal) Collected: 03/01/21 1001    Specimen: Wound from Hip, Right Updated: 03/04/21 0744     Wound Culture Scant growth (1+) Staphylococcus aureus      Scant growth (1+) Mixed Gram Negative Selina      Scant growth (1+) Enterococcus species      Scant growth (1+) Normal Skin Selina     Gram Stain Few (2+) WBCs seen      No organisms seen    Narrative:            Basic Metabolic Panel [[id number]]  (Abnormal) Collected: 03/04/21 0315    Specimen: Blood Updated: 03/04/21 0422     Glucose 145 mg/dL      BUN 11 mg/dL       Creatinine 0.35 mg/dL      Sodium 137 mmol/L      Potassium 4.4 mmol/L      Chloride 103 mmol/L      CO2 26.0 mmol/L      Calcium 8.9 mg/dL      eGFR Non African Amer >150 mL/min/1.73      BUN/Creatinine Ratio 31.4     Anion Gap 8.0 mmol/L     Narrative:      GFR Normal >60  Chronic Kidney Disease <60  Kidney Failure <15      C-reactive Protein [[id number]]  (Abnormal) Collected: 03/04/21 0315    Specimen: Blood Updated: 03/04/21 0422     C-Reactive Protein 10.52 mg/dL     CBC & Differential [[id number]]  (Abnormal) Collected: 03/04/21 0315    Specimen: Blood Updated: 03/04/21 0404    Narrative:      The following orders were created for panel order CBC & Differential.  Procedure                               Abnormality         Status                     ---------                               -----------         ------                     CBC Auto Differential[[id number]]        Abnormal            Final result                 Please view results for these tests on the individual orders.    CBC Auto Differential [[id number]]  (Abnormal) Collected: 03/04/21 0315    Specimen: Blood Updated: 03/04/21 0404     WBC 3.46 10*3/mm3      RBC 3.34 10*6/mm3      Hemoglobin 9.5 g/dL      Hematocrit 29.1 %      MCV 87.1 fL      MCH 28.4 pg      MCHC 32.6 g/dL      RDW 14.8 %      RDW-SD 47.0 fl      MPV 10.2 fL      Platelets 221 10*3/mm3      Neutrophil % 75.1 %      Lymphocyte % 21.1 %      Monocyte % 2.3 %      Eosinophil % 0.0 %      Basophil % 0.3 %      Immature Grans % 1.2 %      Neutrophils, Absolute 2.60 10*3/mm3      Lymphocytes, Absolute 0.73 10*3/mm3      Monocytes, Absolute 0.08 10*3/mm3      Eosinophils, Absolute 0.00 10*3/mm3      Basophils, Absolute 0.01 10*3/mm3      Immature Grans, Absolute 0.04 10*3/mm3      nRBC 0.0 /100 WBC     Sedimentation Rate [[id number]]  (Abnormal) Collected: 03/04/21 0315    Specimen: Blood Updated: 03/04/21 0402     Sed Rate 68 mm/hr     AFB Culture - Aspirate, Back, Lower [[id number]]  Collected: 03/02/21 1515    Specimen: Aspirate from Back, Lower Updated: 03/03/21 1357     AFB Stain No acid fast bacilli seen on direct smear    C-reactive Protein [[id number]]  (Abnormal) Collected: 03/03/21 0454    Specimen: Blood Updated: 03/03/21 0600     C-Reactive Protein 11.77 mg/dL     Protime-INR [[id number]]  (Abnormal) Collected: 03/03/21 0455    Specimen: Blood Updated: 03/03/21 0541     Protime 15.3 Seconds      INR 1.25    CBC & Differential [[id number]]  (Abnormal) Collected: 03/03/21 0454    Specimen: Blood Updated: 03/03/21 0534    Narrative:      The following orders were created for panel order CBC & Differential.  Procedure                               Abnormality         Status                     ---------                               -----------         ------                     CBC Auto Differential[[id number]]        Abnormal            Final result                 Please view results for these tests on the individual orders.    CBC Auto Differential [[id number]]  (Abnormal) Collected: 03/03/21 0454    Specimen: Blood Updated: 03/03/21 0534     WBC 5.29 10*3/mm3      RBC 2.71 10*6/mm3      Hemoglobin 7.7 g/dL      Hematocrit 23.9 %      MCV 88.2 fL      MCH 28.4 pg      MCHC 32.2 g/dL      RDW 15.2 %      RDW-SD 48.7 fl      MPV 9.8 fL      Platelets 254 10*3/mm3      Neutrophil % 50.7 %      Lymphocyte % 28.9 %      Monocyte % 14.9 %      Eosinophil % 3.6 %      Basophil % 0.8 %      Immature Grans % 1.1 %      Neutrophils, Absolute 2.68 10*3/mm3      Lymphocytes, Absolute 1.53 10*3/mm3      Monocytes, Absolute 0.79 10*3/mm3      Eosinophils, Absolute 0.19 10*3/mm3      Basophils, Absolute 0.04 10*3/mm3      Immature Grans, Absolute 0.06 10*3/mm3      nRBC 0.0 /100 WBC     KOH Prep - Aspirate, Back, Lower [[id number]] Collected: 03/02/21 1515    Specimen: Aspirate from Back, Lower Updated: 03/02/21 0118     KOH Prep No yeast or hyphal elements seen    Anaerobic Culture - Aspirate, Back, Lower  [[id number]] Collected: 03/02/21 1515    Specimen: Aspirate from Back, Lower Updated: 03/02/21 1521    Fungus Culture - Aspirate, Back, Lower [[id number]] Collected: 03/02/21 1515    Specimen: Aspirate from Back, Lower Updated: 03/02/21 1521    COVID-19, [facility] IN-HOUSE,NASAL Swab (NO TRANSPORT MEDIA) 2 HR TAT - Swab, Nasopharynx [[id number]]  (Normal) Collected: 03/02/21 1100    Specimen: Swab from Nasopharynx Updated: 03/02/21 1134     COVID19 Presumptive Negative    Narrative:      Fact sheet for providers: https://www.fda.gov/media/265532/download     Fact sheet for patients: https://www.fda.gov/media/619218/download    Test performed by PCR.  If inconsistent with clinical signs and symptoms patient should be tested with different authorized molecular test.    C-reactive Protein [[id number]]  (Abnormal) Collected: 03/02/21 0331    Specimen: Blood Updated: 03/02/21 0409     C-Reactive Protein 17.13 mg/dL     CBC & Differential [[id number]]  (Abnormal) Collected: 03/02/21 0331    Specimen: Blood Updated: 03/02/21 0342    Narrative:      The following orders were created for panel order CBC & Differential.  Procedure                               Abnormality         Status                     ---------                               -----------         ------                     CBC Auto Differential[[id number]]        Abnormal            Final result                 Please view results for these tests on the individual orders.    CBC Auto Differential [[id number]]  (Abnormal) Collected: 03/02/21 0331    Specimen: Blood Updated: 03/02/21 0342     WBC 6.10 10*3/mm3      RBC 2.82 10*6/mm3      Hemoglobin 7.8 g/dL      Hematocrit 24.8 %      MCV 87.9 fL      MCH 27.7 pg      MCHC 31.5 g/dL      RDW 15.0 %      RDW-SD 48.5 fl      MPV 9.7 fL      Platelets 223 10*3/mm3      Neutrophil % 49.1 %      Lymphocyte % 32.6 %      Monocyte % 14.3 %      Eosinophil % 2.5 %      Basophil % 0.8 %      Immature Grans % 0.7 %       Neutrophils, Absolute 3.00 10*3/mm3      Lymphocytes, Absolute 1.99 10*3/mm3      Monocytes, Absolute 0.87 10*3/mm3      Eosinophils, Absolute 0.15 10*3/mm3      Basophils, Absolute 0.05 10*3/mm3      Immature Grans, Absolute 0.04 10*3/mm3      nRBC 0.0 /100 WBC     Vitamin B12 [[id number]]  (Normal) Collected: 03/01/21 1806    Specimen: Blood Updated: 03/02/21 0207     Vitamin B-12 470 pg/mL     Narrative:      Results may be falsely increased if patient taking Biotin.      Folate [[id number]]  (Normal) Collected: 03/01/21 1806    Specimen: Blood Updated: 03/02/21 0207     Folate >20.00 ng/mL     Narrative:      Results may be falsely increased if patient taking Biotin.      Prealbumin [[id number]]  (Abnormal) Collected: 03/01/21 1806    Specimen: Blood Updated: 03/02/21 0202     Prealbumin 10.3 mg/dL     Iron Profile [[id number]]  (Abnormal) Collected: 03/01/21 1806    Specimen: Blood Updated: 03/01/21 1915     Iron 13 mcg/dL      Iron Saturation 8 %      Transferrin 111 mg/dL      TIBC 165 mcg/dL     Ferritin [[id number]]  (Abnormal) Collected: 03/01/21 1806    Specimen: Blood Updated: 03/01/21 1915     Ferritin 406.30 ng/mL     Narrative:      Results may be falsely decreased if patient taking Biotin.      Reticulocytes [[id number]]  (Abnormal) Collected: 03/01/21 1806    Specimen: Blood Updated: 03/01/21 1843     Reticulocyte % 2.42 %      Reticulocyte Absolute 0.0794 10*6/mm3     P2Y12 Platelet Inhibition [[id number]]  (Abnormal) Collected: 03/01/21 1250    Specimen: Blood Updated: 03/01/21 1558     Hematocrit 27.6 %      Platelets 251 10*3/mm3      P2Y12 Reactivity Unit --     Comment: Testing performed at [facility].  See attached scanned report.       Narrative:      PRU reference range 194-418 is for patients not receiving P2Y12 drug. Post Drug results: Lower PRU levels are associated with expected antiplatelet effect. Values may be below the stated reference range above. The post-drug PRU values reported are  .          Protime-INR [[id number]]  (Abnormal) Collected: 03/01/21 1244    Specimen: Blood Updated: 03/01/21 1310     Protime 15.5 Seconds      INR 1.27    aPTT [[id number]]  (Normal) Collected: 03/01/21 1244    Specimen: Blood Updated: 03/01/21 1310     PTT 34.4 seconds     C-reactive Protein [[id number]]  (Abnormal) Collected: 03/01/21 0459    Specimen: Blood Updated: 03/01/21 0558     C-Reactive Protein 15.13 mg/dL     Sedimentation Rate [[id number]]  (Abnormal) Collected: 03/01/21 0459    Specimen: Blood Updated: 03/01/21 0540     Sed Rate 104 mm/hr     CBC & Differential [[id number]]  (Abnormal) Collected: 03/01/21 0459    Specimen: Blood Updated: 03/01/21 0517    Narrative:      The following orders were created for panel order CBC & Differential.  Procedure                               Abnormality         Status                     ---------                               -----------         ------                     CBC Auto Differential[[id number]]        Abnormal            Final result                 Please view results for these tests on the individual orders.    CBC Auto Differential [[id number]]  (Abnormal) Collected: 03/01/21 0459    Specimen: Blood Updated: 03/01/21 0517     WBC 6.52 10*3/mm3      RBC 3.14 10*6/mm3      Hemoglobin 8.9 g/dL      Hematocrit 27.6 %      MCV 87.9 fL      MCH 28.3 pg      MCHC 32.2 g/dL      RDW 15.0 %      RDW-SD 47.8 fl      MPV 9.9 fL      Platelets 251 10*3/mm3      Neutrophil % 62.5 %      Lymphocyte % 19.9 %      Monocyte % 14.1 %      Eosinophil % 2.1 %      Basophil % 0.8 %      Immature Grans % 0.6 %      Neutrophils, Absolute 4.07 10*3/mm3      Lymphocytes, Absolute 1.30 10*3/mm3      Monocytes, Absolute 0.92 10*3/mm3      Eosinophils, Absolute 0.14 10*3/mm3      Basophils, Absolute 0.05 10*3/mm3      Immature Grans, Absolute 0.04 10*3/mm3      nRBC 0.0 /100 WBC     C-reactive Protein [[id number]]  (Abnormal) Collected: 02/28/21 0430    Specimen: Blood Updated:  02/28/21 0631     C-Reactive Protein 14.57 mg/dL     CBC & Differential [[id number]]  (Abnormal) Collected: 02/28/21 0430    Specimen: Blood Updated: 02/28/21 0616    Narrative:      The following orders were created for panel order CBC & Differential.  Procedure                               Abnormality         Status                     ---------                               -----------         ------                     CBC Auto Differential[[id number]]        Abnormal            Final result                 Please view results for these tests on the individual orders.    CBC Auto Differential [[id number]]  (Abnormal) Collected: 02/28/21 0430    Specimen: Blood Updated: 02/28/21 0616     WBC 5.38 10*3/mm3      RBC 3.09 10*6/mm3      Hemoglobin 8.7 g/dL      Hematocrit 26.4 %      MCV 85.4 fL      MCH 28.2 pg      MCHC 33.0 g/dL      RDW 15.2 %      RDW-SD 47.0 fl      MPV 9.7 fL      Platelets 286 10*3/mm3      Neutrophil % 51.7 %      Lymphocyte % 27.9 %      Monocyte % 15.4 %      Eosinophil % 3.5 %      Basophil % 0.6 %      Immature Grans % 0.9 %      Neutrophils, Absolute 2.78 10*3/mm3      Lymphocytes, Absolute 1.50 10*3/mm3      Monocytes, Absolute 0.83 10*3/mm3      Eosinophils, Absolute 0.19 10*3/mm3      Basophils, Absolute 0.03 10*3/mm3      Immature Grans, Absolute 0.05 10*3/mm3      nRBC 0.0 /100 WBC     C-reactive Protein [[id number]]  (Abnormal) Collected: 02/27/21 0437    Specimen: Blood Updated: 02/27/21 0540     C-Reactive Protein 16.05 mg/dL     CBC & Differential [[id number]]  (Abnormal) Collected: 02/27/21 0437    Specimen: Blood Updated: 02/27/21 0521    Narrative:      The following orders were created for panel order CBC & Differential.  Procedure                               Abnormality         Status                     ---------                               -----------         ------                     CBC Auto Differential[[id number]]        Abnormal            Final result                  Please view results for these tests on the individual orders.    CBC Auto Differential [[id number]]  (Abnormal) Collected: 02/27/21 0437    Specimen: Blood Updated: 02/27/21 0521     WBC 6.36 10*3/mm3      RBC 2.75 10*6/mm3      Hemoglobin 7.8 g/dL      Hematocrit 24.5 %      MCV 89.1 fL      MCH 28.4 pg      MCHC 31.8 g/dL      RDW 15.2 %      RDW-SD 48.7 fl      MPV 10.3 fL      Platelets 272 10*3/mm3      Neutrophil % 51.6 %      Lymphocyte % 28.0 %      Monocyte % 16.7 %      Eosinophil % 2.0 %      Basophil % 1.1 %      Immature Grans % 0.6 %      Neutrophils, Absolute 3.28 10*3/mm3      Lymphocytes, Absolute 1.78 10*3/mm3      Monocytes, Absolute 1.06 10*3/mm3      Eosinophils, Absolute 0.13 10*3/mm3      Basophils, Absolute 0.07 10*3/mm3      Immature Grans, Absolute 0.04 10*3/mm3      nRBC 0.0 /100 WBC     C-reactive Protein [[id number]]  (Abnormal) Collected: 02/26/21 0346    Specimen: Blood Updated: 02/26/21 0431     C-Reactive Protein 16.72 mg/dL     Comprehensive Metabolic Panel [[id number]]  (Abnormal) Collected: 02/26/21 0346    Specimen: Blood Updated: 02/26/21 0431     Glucose 94 mg/dL      BUN 9 mg/dL      Creatinine 0.46 mg/dL      Sodium 136 mmol/L      Potassium 4.1 mmol/L      Chloride 100 mmol/L      CO2 29.0 mmol/L      Calcium 8.9 mg/dL      Total Protein 6.3 g/dL      Albumin 2.50 g/dL      ALT (SGPT) <5 U/L      AST (SGOT) 20 U/L      Alkaline Phosphatase 103 U/L      Total Bilirubin 0.4 mg/dL      eGFR Non African Amer 135 mL/min/1.73      Globulin 3.8 gm/dL      A/G Ratio 0.7 g/dL      BUN/Creatinine Ratio 19.6     Anion Gap 7.0 mmol/L     Narrative:      GFR Normal >60  Chronic Kidney Disease <60  Kidney Failure <15      aPTT [[id number]]  (Abnormal) Collected: 02/26/21 0346    Specimen: Blood Updated: 02/26/21 0418     PTT 52.1 seconds     Protime-INR [[id number]]  (Abnormal) Collected: 02/26/21 0346    Specimen: Blood Updated: 02/26/21 0418     Protime 20.7 Seconds      INR 1.81    CBC  & Differential [[id number]]  (Abnormal) Collected: 02/26/21 0346    Specimen: Blood Updated: 02/26/21 0403    Narrative:      The following orders were created for panel order CBC & Differential.  Procedure                               Abnormality         Status                     ---------                               -----------         ------                     CBC Auto Differential[[id number]]        Abnormal            Final result                 Please view results for these tests on the individual orders.    CBC Auto Differential [[id number]]  (Abnormal) Collected: 02/26/21 0346    Specimen: Blood Updated: 02/26/21 0403     WBC 6.39 10*3/mm3      RBC 3.11 10*6/mm3      Hemoglobin 8.8 g/dL      Hematocrit 27.7 %      MCV 89.1 fL      MCH 28.3 pg      MCHC 31.8 g/dL      RDW 15.3 %      RDW-SD 49.1 fl      MPV 9.8 fL      Platelets 299 10*3/mm3      Neutrophil % 58.3 %      Lymphocyte % 23.3 %      Monocyte % 14.6 %      Eosinophil % 2.5 %      Basophil % 0.8 %      Immature Grans % 0.5 %      Neutrophils, Absolute 3.73 10*3/mm3      Lymphocytes, Absolute 1.49 10*3/mm3      Monocytes, Absolute 0.93 10*3/mm3      Eosinophils, Absolute 0.16 10*3/mm3      Basophils, Absolute 0.05 10*3/mm3      Immature Grans, Absolute 0.03 10*3/mm3      nRBC 0.0 /100 WBC         Ct Head Without Contrast    Result Date: 2/8/2021  Narrative: EXAMINATION: CT HEAD WO CONTRAST-  2/8/2021 9:36 PM CST  CT SCAN OF THE HEAD, WITHOUT CONTRAST:  HISTORY: Altered mental status, fall  COMPARISONS: 11/20/2014 head CT  TECHNIQUE:  Radiation dose equals  mGy-cm.  Automated exposure control dose reduction technique was implemented.   CT evaluation of the head without intravenous contrast. 5 mm transaxial images were obtained.   2-D sagittal and coronal reconstruction images were generated.  FINDINGS:  There is no intra or extra-axial hemorrhage.  There is no mass, mass effect or midline shift.  There is no hydrocephalus  There is  opacification of the sphenoid ethmoid sinuses.  There is no skull fracture or acute calvarial abnormality.           Impression: 1. No CT evidence of an acute intracranial process. 2. Extensive paranasal sinus disease.    This report was finalized on 02/08/2021 21:38 by Dr. Sterling Elizalde MD.    Ct Cervical Spine Without Contrast    Result Date: 2/8/2021  Narrative: EXAMINATION:  CT CERVICAL SPINE WO CONTRAST-  2/8/2021 9:14 PM CST  HISTORY: ams, unwitnessed fall  COMPARISON: No comparison study.  TECHNIQUE: Radiation dose equals  mGy-cm.  Automated exposure control dose reduction technique was implemented.  Thin section axial imaging was obtained without intravenous contrast. 2-D sagittal and coronal reconstruction images were generated.  FINDINGS:  The facets are appropriately aligned.  The tibial body heights are maintained. The prevertebral soft tissues are normal.  There is no CT evidence of acute fracture or subluxation.  There is advanced disc degeneration spondylosis C5-C6 and C6-C7 with complete loss of disc space, 8 end plate sclerosis and anterior posterior osteophyte formation.  There is high-grade canal stenosis associated with right paracentral osteophyte at C5-C6 with relative canal stenosis at C6-C7 left paracentrally due to osteophyte formation.  There is moderate bilateral foraminal stenosis at these levels particularly at C5-C6.  There are some degenerative changes at C7-T1 with anterior osteophyte formation.  The remaining disc spaces are maintained without significant canal foraminal stenosis.  There is no CT evidence of posttraumatic disc herniation.  There are carotid artery calcifications.  The upper lung fields are clear.  Sphenoid sinus ethmoid sinus opacification and spurring noted.      Impression: 1. No CT evidence of acute bony injury to the cervical spine. 2. Advanced disc degeneration and spondylosis C5-C6 and to a lesser degree C6-C7 with resulting canal and foraminal stenosis.  This report was finalized on 02/08/2021 21:50 by Dr. Sterling Elizalde MD.    Ct Thoracic Spine Without Contrast    Result Date: 2/8/2021  Narrative: EXAMINATION:  CT THORACIC SPINE WO CONTRAST-  2/8/2021 9:14 PM CST  HISTORY: ams, unwitnessed fall  COMPARISON: No comparison study.  TECHNIQUE: Radiation dose equals DLP 1/2/2002 mGy-cm.  Automated exposure control dose reduction technique was implemented.  Thin section axial imaging was obtained without intravenous contrast. 2-D sagittal and coronal reconstruction images were generated.  FINDINGS: There is osteoporosis.  There are diffuse spondylosis changes. Bridging anterior osteophyte formation observed. There is mild endplate irregularities with Schmorl's node changes in the mid to lower thoracic spine.  There is no CT evidence of acute fracture or subluxation.  There is no significant canal stenosis. There is no CT evidence of posttraumatic disc herniation.      Impression: 1. No CT evidence of acute bony injury to the thoracic spine. This report was finalized on 02/08/2021 22:26 by Dr. Sterling Elizalde MD.    Ct Lumbar Spine Without Contrast    Result Date: 2/8/2021  Narrative: EXAMINATION:  CT LUMBAR SPINE WO CONTRAST-  2/8/2021 9:14 PM CST  HISTORY: ams, unwitnessed fall, pain  COMPARISON: Lumbar spine radiographs dated 11/7/2019  TECHNIQUE: Radiation dose equals  mGy-cm.  Automated exposure control dose reduction technique was implemented.  Thin section axial imaging was obtained without intravenous contrast. 2-D sagittal and coronal reconstruction images were generated.  FINDINGS:  Extensive postsurgical changes from posterior and interbody fusion identified at L3-L4 and L4-L5. The hardware is imaged appropriately without screw fracture or hardware complications.  Large laminectomy defect identified at L4.  There is no CT evidence of acute fracture or subluxation.  There is disc degeneration at L2-L3 and L1-L2 with disc space narrowing and vacuum  phenomenon, endplate sclerosis and osteophyte formation.  There is broad-based partially calcified disc at L1-L2 with resulting canal stenosis. There is facet arthropathy with bilateral foraminal stenosis particularly on the right  There is mild posterior listhesis of L2 on L3 with posterior osteophyte and disc complex with relative canal and foraminal narrowing.      Impression: 1. No CT evidence of acute bony injury to the lumbar spine. 2. Extensive postsurgical changes. 3. Advanced disc degeneration and spondylosis at L2-L3 and L1-L2. This report was finalized on 02/08/2021 22:36 by Dr. Sterling Elizalde MD.    Mri Brain Without Contrast    Result Date: 2/9/2021  Narrative: EXAMINATION: MRI BRAIN WO CONTRAST-  2/9/2021 12:50 PM CST  HISTORY: Syncope/fainting; R41.82-Altered mental status, unspecified; R55-Syncope and collapse; Z74.09-Other reduced mobility  Noncontrast MR imaging of the brain.  No acute infarct.  No brain hemorrhage or abnormal calcification.  Ventricle size is appropriate. Mild cortical atrophy.  Mild small vessel disease within the periventricular white matter.  Mucosal thickening throughout all paranasal sinuses. Bilateral mastoid fluid.  Summary: 1. Mild atrophy and small vessel disease. 2. Paranasal sinus inflammatory disease and mastoid fluid. 3. No mass or infarct. This report was finalized on 02/09/2021 13:33 by Dr. Jesus Manuel Santos MD.    Mri Cervical Spine With & Without Contrast    Result Date: 2/24/2021  Narrative: EXAMINATION:   MRI CERVICAL SPINE W WO CONTRAST-  2/24/2021 3:30 PM CST  HISTORY: MRI CERVICAL SPINE W WO CONTRAST- 2/24/2021 2:17 PM CST  HISTORY: Osteoarthritis, cervical; R26.9-Unspecified abnormalities of gait and mobility  COMPARISON: None  TECHNIQUE: Multiplanar, multisequence MRI of the cervical spine was obtained without and with contrast.  FINDINGS: Imaged portions of the cerebellum and brainstem are unremarkable.  Alignment: There is straightening and mild reversal of  the normal cervical lordosis..  Marrow signal: No pathologic marrow infiltrate is demonstrated. The vertebral body heights and posterior elements are maintained.  Cord/Canal: There is severe cervical stenosis at C5-6 level there is moderate to advanced cervical stenosis at the C6-7 level..  Soft tissues: The surrounding soft tissues are unremarkable.  Levels: C2-C3: No disc bulge is present. No significant neuroforaminal or central canal stenosis is seen.  C3-C4: No disc bulge is present. No significant neuroforaminal or central canal stenosis is seen.  C4-C5: The disc spaces intact. Mild uncinate spurring on the right. The left neural foramen is patent..  C5-C6: A large central and right lateral extruded disc is present. This flattens the central and right side of the cord and extends into the right intervertebral neural foramen. This is severe central canal stenosis..  C6-C7: A central and leftward disc hypertrophic uncinate spur is present flattening the central and left side of the cord and compromising the left intervertebral neural foramen at the C6-C7 level..  C7-T1: No disc bulge is present. No significant neuroforaminal or central canal stenosis is seen.  Following infusion gadolinium there is no abnormal enhancement.      Impression: 1. Large central and rightward disc causing severe central canal stenosis and occluding the right neural foramen at the C5-6 level. 2. Central and leftward disc flattening the cord and occluding the left neural foramen at the C6-7 level.   This report was finalized on 02/24/2021 15:37 by Dr. Chai Francisco MD.    Ct Abdomen Pelvis With Contrast    Addendum Date: 2/26/2021 Addendum:   ADDENDUM  IMPRESSION should also include: 5. Thickening of the distal esophagus, which can be seen with reflux. Correlate with symptoms and consider direct visualization when clinically appropriate.  END ADDENDUM This report was finalized on 02/26/2021 11:43 by Dr Paulina Tavares MD.    Result  Date: 2/26/2021  Narrative: EXAM: CT ABDOMEN PELVIS W CONTRAST- - 2/26/2021 8:46 AM CST  HISTORY: Abdominal pain, fever, post-op   COMPARISON: 4/23/2011.  DOSE LENGTH PRODUCT: 1401 mGy cm. Automatic exposure control was utilized to make radiation dose as low as reasonably achievable.  TECHNIQUE: Enhanced  axial images of the abdomen and pelvis obtained with multiplanar reformats.  FINDINGS: VISUALIZED CHEST: Coronary artery calcifications. Lung bases grossly clear. No pleural or pericardial effusion.  LIVER: No focal liver lesion. Patent portal and hepatic veins.  BILIARY: Gallstone. No bile duct dilation.  PANCREAS: Normal pancreas contour and enhancement.  SPLEEN: Normal size and contour.  ADRENAL: Normal appearance of the bilateral adrenal glands.  GENITOURINARY: No hydronephrosis. No suspicious renal lesion. Early contrast in the renal collecting systems Limited evaluation for nephrolithiasis. There is hyperdensity at the dependent aspect of the urinary bladder. Uterus and adnexa appear within normal limits, not optimally evaluated by CT.  PERITONEUM: No free air or ascites.  GI TRACT: Thickening of the distal esophagus. Normal configuration of the stomach and duodenum.  No abnormally dilated loops of bowel or bowel wall thickening.  Normal appendix on axial images 58-66.  VESSELS: Aorta normal in course and caliber with main branch vessels patent. Calcified atherosclerosis.  RETROPERITONEUM: No mass, lymphadenopathy or hemorrhage.  SOFT TISSUES: Linear gas tracking at the right lateral hip soft tissues. No discrete drainable fluid collection. Gas is closely adjacent to the right greater trochanter. No convincing erosive bony changes. No large hip joint effusion.  BONES: Postoperative and degenerative changes in the spine. No acute bony finding.       Impression: 1. Linear gas tracking along the right lateral hip soft tissues. No discrete drainable fluid collection. Gas is closely adjacent to the right greater  trochanter, but not definitely touching it. No convincing erosive bony changes at this time. No large hip joint effusion. 2. Density in the dependent aspect of the urinary bladder, could represent early excretion of contrast, blood products or mass. Consider follow-up ultrasound in 2-4 weeks to evaluate for resolution. 3. Gallstone. 4. Coronary artery calcifications. This report was finalized on 02/26/2021 09:14 by Dr Paulina Tavares MD.    Xr Chest 1 View    Result Date: 2/8/2021  Narrative: EXAMINATION: XR CHEST 1 VW-. 2/8/2021 10:02 PM CST  CHEST, ONE VIEW:  HISTORY: Altered mental status  COMPARISON: 7/7/2014 chest radiography  A single frontal chest radiograph was obtained.  FINDINGS:  Permanent cardiac pacemaker identified.  Calcified granuloma noted in the right upper lobe.  Mild chronic interstitial prominence identified without parenchymal infiltrates.  There are no pleural effusions or pneumothoraces.  The heart is normal in size without evidence of overt heart failure.  No acute osseous abnormalities identified.                                  Impression: 1. No acute cardiopulmonary process.  This report was finalized on 02/08/2021 22:03 by Dr. Sterling Elizalde MD.    Mri Hip Right Without Contrast    Result Date: 2/9/2021  Narrative: EXAMINATION: MRI HIP RIGHT WO CONTRAST-  2/9/2021 1:14 PM CST  HISTORY: Soft tissue infection suspected, hip, xray done Postoperative hip evaluation.  Noncontrast MR imaging of the right hip.  Comparison is made with CT exam performed earlier today and MRI from 12/11/2020.  Postoperative changes with residual fluid collection lateral to the right hip.  There is extensive edema within the right gluteus muscle.  Edema within the proximal right femur greater trochanter associated with gluteus tendon reattachment.  No occult fracture.  Small amount of joint fluid.  Summary: 1. Extensive gluteus muscle edema. No drainable gluteus muscle fluid collection is seen. 2. Moderate amount  of incisional fluid. This report was finalized on 02/09/2021 14:27 by Dr. Jesus Manuel Santos MD.    Fl C Arm During Surgery    Result Date: 3/3/2021  Narrative: EXAMINATION:   XR SPINE CERVICAL 2 VW-  3/3/2021 6:44 PM CST  HISTORY: XR SPINE CERVICAL 2 VW- 3/3/2021 2:00 PM CST  HISTORY: corpectomy; M48.02-Spinal stenosis, cervical region; G99.2-Myelopathy in diseases classified elsewhere; R26.9-Unspecified abnormalities of gait and mobility; M48.062-Spinal stenosis, lumbar region with neurogenic claudication; M46.46-Discitis, unspecified, lumbar region; M46.26-Osteomyelitis of vertebra, lumbar region; R78.81-Bacteremia; B95.61-Methicillin susceptible Staphylococcus aureus infect  COMPARISON: None  FLUOROSCOPY TIME: 35.6 seconds  FLUOROSCOPY DOSE: 3.73 mGy  NUMBER OF IMAGES: 2      Impression:  Intraoperative fluoroscopic images during anterior fusion and C6 cervical corpectomy.  Please refer to the operative note for more details. This report was finalized on 03/03/2021 18:45 by Dr. Chai Francisco MD.    Ct Angiogram Chest    Result Date: 2/8/2021  Narrative: EXAMINATION: CT ANGIOGRAM CHEST-  2/8/2021 9:53 PM CST  HISTORY:Chest pain post fall  COMPARISON: Current chest radiography  TECHNIQUE:  CT evaluation of the chest was performed with intravenous contrast. 2 mm transaxial images were obtained.. Coronal reconstruction maximum intensity projection images of the pulmonary arteries and aorta were also generated.  Radiation dose equals  mGy-cm.  Automated exposure control dose reduction technique was implemented.   FINDINGS:  [The pulmonary arteries opacify with iodinated contrast without intraluminal filling defects. There is no CT angiographic evidence of pulmonary embolus.  There is no aortic dissection or aneurysm.  There are no pneumothoraces or pleural effusions.  There are no areas of airspace consolidation.  Calcified granuloma noted in the right upper lobe.  There is mild reticular groundglass opacities  appreciated in the right upper lobe anteriorly, nonspecific. Acute infectious or inflammatory change considered. Some mild pulmonary contusion would be difficult to exclude, however less likely.  There is mild ground glass opacities posteriorly in the lower lobes likely representing atelectasis associated with the level of inspiration.  There is no mediastinal or hilar lymphadenopathy. There are calcified right hilar lymph nodes.  Coronary artery calcifications observed. Permanent cardiac pacemaker leads identified.  Limited imaging the upper abdomen demonstrate no acute abnormality.  There is mild deformity of the right fourth rib anteriorly, some old trauma questioned. Acute bony injury difficult to totally exclude, however no discrete fracture line observed. Correlate with clinical findings.  Spondylosis changes observed in the thoracic spine. No acute spine fracture observed.]      Impression: 1. No CT angiographic evidence of pulmonary embolus or acute aortic pathology. 2. Mild reticular groundglass reticular opacities in the right upper lobe, etiology uncertain. Differential considerations includes mild infectious/inflammatory changes. Mild pulmonary contusion as well as chronic interstitial lung changes considered. Probable mild atelectasis in the lower lobes. Lung fields are otherwise clear. 3. Mild deformity right anterior fourth rib, question old trauma. Correlate with pedicle findings. No definite acute osseous abnormalities observed otherwise. This report was finalized on 02/08/2021 22:01 by Dr. Sterling Elizalde MD.    Ct Guided Biopsy Aspiration Injection    Result Date: 3/2/2021  Narrative: EXAMINATION: CT guided aspiration 3/2/2021  DOSE: 963 mGycm. Automated exposure control was utilized to diminish patient radiation dose..  HISTORY: Right psoas abscess.  FINDINGS: Multiple contiguous axials are obtained through the abdomen and upper pelvis per protocol without intravenous or oral contrast  administration. There is persistent hypodensity within the right psoas muscle with persistent mild enlargement of the right psoas in comparison with the contralateral psoas muscle. This fluid collection does show significant diminishment in size when compared to the previous CT examination dated 2/26/2021. The upper aspect of the abscess appears to have resolved with previously noted air and fluid having resolved. The findings are reviewed with Dr. Elie Ohara of the infectious disease service. Given that there is some residual collection present it was felt that it would benefit the patient to aspirate as much of this fluid as possible. Given the small size of the collection drainage catheter placement would likely be difficult.  The risks and benefits of the procedure were discussed with the patient thoroughly. A signed consent form was on the chart at the time of the procedure. The patient is placed in the left lateral decubitus position and markers placed over the posterior skin in an attempt to localize the collection. I subsequently advanced a 6 Ma[place] Yueh catheter from a posterior approach and following confirmation that the tip of the catheter was within the collection the inner trocar was removed. I subsequently aspirated approximately 20 cc of frankly purulent fluid from the psoas collection. Once no additional fluid could be aspirated from the collection I removed the catheter and maintained pressure on the puncture site until obtaining hemostasis. The patient tolerated the procedure well. All 20 cc of the radial and material was placed into 2 sterile test tubes and sent to the laboratory for further culture and evaluation as requested by Dr. Ohara.      Impression: 1.. The right psoas abscess has shown diminishment in size from the previous CT exam. The upper margin of the collection is no longer well demonstrated and there is some mild overall diminishment in size of the right psoas muscle in  comparison with the left. Given the clinical situation in which the patient is to undergo surgery it was elected to attempt aspiration with removal of as much of the material as possible. I was subsequently able to remove proximally 20 cc of frankly purulent material from the right psoas muscle collection. This was sent to the laboratory for further evaluation as requested. There were no immediate complications. This report was finalized on 03/02/2021 14:46 by Dr. Neymar Calderon MD.    Ct Lower Extremity Right Without Contrast    Result Date: 2/9/2021  Narrative: CT LOWER EXTREMITY RIGHT WO CONTRAST- 2/9/2021 8:16 AM CST  HISTORY: Soft tissue infection suspected, hip, xray done; R41.82-Altered mental status, unspecified; R55-Syncope and collapse  COMPARISON: Plain films 2/8/2021  DOSE LENGTH PRODUCT: 410 mGy cm. Automated exposure control was also utilized to decrease patient radiation dose.  TECHNIQUE: Axial images of the right hip are obtained without IV contrast. Sagittal coronal images reformatted.  FINDINGS:  There is straightening of the lateral subcutaneous tissues of the right hip extending to the regional musculature with no organized abscess collection. Enthesophytes project from the anterior iliac spine and the greater trochanter. There are linear lucencies of the right proximal femur within the intratrochanteric region which may represent sites of hardware removal. There is mild hip joint space narrowing with early bony spurring of the femoral head. No bony fracture or dislocation. No radiographic evidence of avascular necrosis.  Postoperative changes of the lower lumbar spine. Mild arthritic change of the right sacroiliac joint.  A normal-appearing appendix is identified. Vascular calcifications. Calcifications within the uterus may represent small leiomyomas.      Impression: 1. There is stranding of the subcutaneous tissues of the lateral right hip with no organized abscess collection. 2. Hardware  removal suspected from the proximal right femur with no acute osseous pathology. This report was finalized on 02/09/2021 08:49 by Dr. Trinity Gómez MD.    Xr Abdomen Kub    Result Date: 2/21/2021  Narrative: Exam:   XR ABDOMEN KUB-   Date:  2/21/2021 6:05 PM CST  History:  Female, age  65 years; constipation; R26.9-Unspecified abnormalities of gait and mobility  COMPARISON:  None.  Findings : Nonobstructive bowel gas pattern. Moderate amount of stool in the colon.  No definite radiopaque stone.  No acute osseous pathology. Prior lumbar fusion.      Impression: 1.  No acute abdominopelvic abnormalities. This report was finalized on 02/21/2021 18:05 by Dr. Angela Torres MD.    Xr Spine Cervical 2 View    Result Date: 3/3/2021  Narrative: EXAMINATION:   XR SPINE CERVICAL 2 VW-  3/3/2021 6:44 PM CST  HISTORY: XR SPINE CERVICAL 2 VW- 3/3/2021 2:00 PM CST  HISTORY: corpectomy; M48.02-Spinal stenosis, cervical region; G99.2-Myelopathy in diseases classified elsewhere; R26.9-Unspecified abnormalities of gait and mobility; M48.062-Spinal stenosis, lumbar region with neurogenic claudication; M46.46-Discitis, unspecified, lumbar region; M46.26-Osteomyelitis of vertebra, lumbar region; R78.81-Bacteremia; B95.61-Methicillin susceptible Staphylococcus aureus infect  COMPARISON: None  FLUOROSCOPY TIME: 35.6 seconds  FLUOROSCOPY DOSE: 3.73 mGy  NUMBER OF IMAGES: 2      Impression:  Intraoperative fluoroscopic images during anterior fusion and C6 cervical corpectomy.  Please refer to the operative note for more details. This report was finalized on 03/03/2021 18:45 by Dr. Chai Francisco MD.    Mri Lumbar Spine With & Without Contrast    Result Date: 2/28/2021  Narrative: HISTORY: Increasing midline back pain with right lower extremity radiculopathy. Right psoas muscle abscess  MRI lumbar spine: Multiplanar images lumbar spine performed pre- and post-IV contrast.  COMPARISON: 2/22/2021 MRI lumbar spine exam  FINDINGS: The  alignment of lumbar spine remains appropriate. Conus medullaris terminates at the L1 level. No abnormal signal or enhancement within the conus medullaris.  The right psoas abscess is again visualized and is very similar in size and morphology measuring 2.8 cm in width width, 2.3 cm in AP diameter and approximately 12.2 cm in craniocaudal dimension extending from the L1-L5 level.  There are extensive signal changes within and adjacent the L1/L2 disc. There is enhancing hyperintense T2 signal within the L1/L2 disc concerning for discitis with possible developing adjacent endplate osteomyelitis. There is a large L1/L2 central disc protrusion resulting in severe L1/L2 canal stenosis. No epidural abscess is localized. There is moderate right neural foramen narrowing at this L1/L2 level secondary to foraminal disc bulging, mild facet osteoarthropathy, as well as inflammatory changes of the right paravertebral tissues extending to the right L1/L2 foramen.  At L2/L3, there is moderate central canal stenosis secondary to mild to moderate posterior disc bulging and moderate facet osteoarthropathy. There is mild to moderate right and mild left neural foramen narrowing at this level.  At L3/L4, there is streak artifact from the regional hardware. There is moderate facet osteoarthropathy with mild posterior endplate spurring suspected. There is mild to moderate canal stenosis with mild effacement the right lateral recess. Mild to moderate right and mild left neural foramen narrowing considered.  At L4/L5, there are laminectomy changes. No prominent central canal stenosis. Mild right neural foramen narrowing considered.  At L5-S1, there is moderate left and mild to moderate right facet arthropathy. No significant disc bulging. No central canal stenosis or neural foramen narrowing.      Impression: 1. Similar right psaos abscess and compared to 2/22/2021. 2. Discitis/possible osteomyelitis at the L1/L2 level. No epidural abscess  identified. 3. Severe canal stenosis at the L1/L2 level primarily due to a large central disc protrusion. Please refer to dictation above for detailed findings at each level.  This report was finalized on 02/28/2021 13:47 by Dr. Trinity Gómez MD.    Mri Lumbar Spine With & Without Contrast    Result Date: 2/22/2021  Narrative: EXAM: MR LUMBOSACRAL SPINE WITHOUT AND WITH IV CONTRAST 2/22/2021  COMPARISON: MRI lumbar spine dated 4/28/2017.  INDICATION: 65 years-old Female. Ascites.  TECHNIQUE: Routine pulse sequences were obtained of the lumbar spine before and after the administration of IV contrast.  FINDINGS: Straightening of the normal lordosis of the lumbar spine. There are 5 nonrib-bearing vertebral bodies. Prior L3-L4, L4-5 posterior fusion without obvious hardware complication.  There is abnormal disc fluid signal with irregularities of the endplates at L1-L2,, most concerning for discitis osteomyelitis. Additionally, the right psoas muscle shows a heterogeneous fluid signal measuring 2.7 x 1.7 x 1.1 cm with peripheral enhancement, consistent with an abscess. Adjacent phlegmonous changes in the right psoas muscle. There is no evidence of epidural abscess at this time.  There is severe thecal sac stenosis at L1-L2 related to disc extrusion and ligamentum flavum hypertrophy.  Laminectomy defect at L4-L5.  The visualized cord demonstrate no signal abnormalities.        Impression: 1.  Discitis osteomyelitis at L1-L2. 2.  Right psoas abscess. 3.  Severe thecal sac stenosis at L1-L2. This report was finalized on 02/22/2021 17:14 by Dr. Angela Torres MD.    Xr Hip With Or Without Pelvis 2 - 3 View Right    Result Date: 2/8/2021  Narrative: EXAMINATION:  XR HIP W OR WO PELVIS 2-3 VIEW RIGHT-  2/8/2021 8:25 PM CST  HISTORY: drainage from incision, recent repair  COMPARISON: 10/22/2020 right hip radiographs  TECHNIQUE: 3 views: AP pelvis, AP and lateral right hip  FINDINGS:  The right femoral head is imaged  appropriately within the acetabulum. Contracted the femoral head is maintained without fracture.  The left hip joint is maintained.  The bony pelvis is intact without fracture.  Degenerative changes noted in the lower lumbar spine.      Impression: 1. No acute osseous abnormality. This report was finalized on 02/08/2021 22:55 by Dr. Sterling Elizalde MD.        Assessment:           Stenosis of cervical spine with myelopathy (CMS/HCC)    Spinal stenosis, lumbar region, with neurogenic claudication    Discitis of lumbar region    Osteomyelitis of lumbar spine (CMS/HCC)    Past Medical History:   Diagnosis Date   • Arthritis    • Asthma    • Chronic sinusitis    • Coronary artery disease    • Eustachian tube dysfunction    • Heart disease    • Herpes simplex    • Hyperlipidemia    • Hypertension    • Knee dislocation    • Labral tear of right hip joint    • Laryngitis sicca    • Laryngitis, chronic    • MI (myocardial infarction) (CMS/HCC)    • Otorrhea    • Sensorineural hearing loss    • Sjogren's disease (CMS/HCC)        Plan:     1. MSSA bacteremia  2. Torn right gluteus muscle s/p repair  3. L1-2 discitis osteomyelitis  4. Severe cervical spinal stenosis s/p vertebral corpectomy and decompression  5. RA  6. Chronic immunosuppression  7. Multifactorial anemia  8. Hypothyroidism  9. GERD  10. Herpes simplex on chronic suppressive therapy  11. Hx CAD  12. HTN    Continue current treatment. Monitor counts. Increase activity as tolerated within restrictions per ortho and neurosurgery. Continue pain control efforts avoiding oversedation. Antibiotic therapy under direction of ID. Labs in am. Continue wound care per wound care team.       Electronically signed by DANIELA Hill on 3/4/2021 at 11:46 CST     Copy sent to Davon Locke MD  I have discussed the care of Tawny Shin, including pertinent history and exam findings, with the nurse practitioner.    I have seen and examined the patient and the key  elements of all parts of the encounter have been performed by me.  I agree with the assessment, plan and orders as documented by DANIELA Kyle, after I modified the exam findings and the plan of treatments and the final version is my approved version of the assessment.        Electronically signed by Beni Urrutia MD on 3/4/2021 at 19:52 CST

## 2021-03-04 NOTE — THERAPY TREATMENT NOTE
Patient Name: Tawny Shin  : 1955    MRN: [id number]                              Today's Date: 3/4/2021       Admit Date: 2021    Visit Dx:     ICD-10-CM ICD-9-CM   1. Stenosis of cervical spine with myelopathy (CMS/[facility])  M48.02 723.0    G99.2 336.3   2. Gait abnormality  R26.9 781.2   3. Spinal stenosis, lumbar region, with neurogenic claudication  M48.062 724.03   4. Discitis of lumbar region  M46.46 722.93   5. Osteomyelitis of lumbar spine (CMS/[facility])  M46.26 730.28   6. Staphylococcus aureus bacteremia  R78.81 790.7    B95.61 041.11   7. Impaired mobility and ADLs  Z74.09 V49.89    Z78.9      Patient Active Problem List   Diagnosis   • Eustachian tube dysfunction   • Sensorineural hearing loss   • Lumbago of multiple sites in spine with sciatica   • Spondylolisthesis of lumbar region   • Coronary artery disease   • Hyperlipidemia   • Hypertension   • Myalgia due to statin   • S/P coronary artery stent placement   • Pacemaker   • Seropositive rheumatoid arthritis of multiple sites (CMS/[facility])   • Sick sinus syndrome (CMS/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)   • Obesity   • Primary osteoarthritis of left knee   • Psoriasis vulgaris   • Recurrent acute serous otitis media of both ears   • S/P lumbar laminectomy   • Thrombocytopenia (CMS/[facility])   • Hypothyroidism   • Vitamin D deficiency   • Myxedema heart disease   • Spondylolisthesis of lumbar region   • Syncope, cardiogenic   • Rupture of gluteus minimus tendon   • Muscle tear   • Syncope, recurrent   • Leukocytosis   • Headache   • Elevated CPK   • Staphylococcus aureus bacteremia   • Right hip pain, suspected infection    • Obesity (BMI 30-39.9)   • Stenosis of cervical spine with myelopathy (CMS/[facility])   • Spinal stenosis, lumbar region, with neurogenic claudication   • Discitis of lumbar region   • Osteomyelitis of lumbar spine (CMS/HCC)     Past Medical History:   Diagnosis Date   • Arthritis    • Asthma    • Chronic sinusitis    • Coronary artery disease    • Eustachian tube dysfunction    • Heart disease    • Herpes simplex    • Hyperlipidemia    • Hypertension    • Knee dislocation    • Labral tear of right hip joint    • Laryngitis sicca    • Laryngitis, chronic    • MI (myocardial infarction) (CMS/HCC)    • Otorrhea    • Sensorineural hearing loss    • Sjogren's disease (CMS/HCC)      Past Surgical History:   Procedure Laterality Date   • A-V CARDIAC PACEMAKER INSERTION  2016   • ATRIAL CARDIAC PACEMAKER INSERTION     • CARDIAC CATHETERIZATION     • CATARACT EXTRACTION     • CERVICAL CORPECTOMY N/A 3/3/2021    Procedure: CERVICAL 6 CORPECTOMY WITH TITANIUM CAGE WITH NEURO MONITORING;  Surgeon: Bandar Shea MD;  Location:  [facility];  Service: Neurosurgery;  Laterality: N/A;   • COLONOSCOPY  11/08/2011    One fold in the ascending colon which showed ulcer otherwise normal exam   • COLONOSCOPY  11/12/2004    Normal exam repeat in five years   • CORONARY ANGIOPLASTY WITH STENT PLACEMENT      X 2; 2013 & 2014   • ENDOSCOPY  07/10/2014    Normal exam   • HIP ABDUCTION TENOTOMY BILATERAL Right 1/14/2021    Procedure: RIGHT HIP GLUTEUS MEDLUS / MINIMUS REPAIR, POSSIBLE ACHILLES ALLOGRAFT;  Surgeon: Nino Carlson MD;  Location:  [facility];  Service: Orthopedics;  Laterality: Right;   • INCISION AND DRAINAGE HIP Right 2/9/2021    Procedure: HIP INCISION AND DRAINAGE;  Surgeon: Nino Carlson MD;  Location:  [facility];  Service: Orthopedics;  Laterality: Right;   • JOINT REPLACEMENT     • LUMBAR FUSION N/A 1/19/2018    Procedure: L3-4,L4-5 DECOMPRESSION, POSTERIOR SPINAL FUSION WITH INSTRUMENTATION;  Surgeon: Fortino Oropeza  MD;  Location:  [facility];  Service:    • LUMBAR LAMINECTOMY WITH FUSION Left 1/17/2018    Procedure: LEFT L3-4 L4-5 LATERAL LUMBAR INTERBODY FUSION;  Surgeon: Fortino Oropeza MD;  Location:  [facility];  Service:    • MYRINGOTOMY W/ TUBES  09/04/2014    TUBES NO LONGER IN PLACE   • OTHER SURGICAL HISTORY      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 so hardware was removed and spacers were placed   • REPLACEMENT TOTAL KNEE Right      General Information     Row Name 03/04/21 1400 03/04/21 0900       Physical Therapy Time and Intention    Document Type  therapy note (daily note)  -MS  re-evaluation s/p C6 corpectomy with titanium cage  -MS    Mode of Treatment  physical therapy  -MS  physical therapy;co-treatment  -MS    Row Name 03/04/21 1400 03/04/21 0900       General Information    Patient Profile Reviewed  --  yes  -MS    Prior Level of Function  --  independent:;all household mobility;community mobility;ADL's I before fall with I and D of R hip wound. Limited to stand piviot for 4 weeks due to TTWB R LE.  -MS    Existing Precautions/Restrictions  fall;spinal cervical collar at all times  -MS  fall;spinal cervical collar at all times, WBAT R LE per Silverio HERMAN for Dr. Carlson.  -MS    Barriers to Rehab  --  previous functional deficit;physical barrier  -MS    Row Name 03/04/21 0900          Living Environment    Lives With  alone  -MS     Row Name 03/04/21 0900          Home Main Entrance    Number of Stairs, Main Entrance  one  -MS     Stair Railings, Main Entrance  none  -MS     Row Name 03/04/21 0900          Stairs Within Home, Primary    Number of Stairs, Within Home, Primary  none  -MS     Row Name 03/04/21 1400 03/04/21 0900       Cognition    Orientation Status (Cognition)  oriented x 4  -MS  oriented x 4  -MS    Row Name 03/04/21 0900          Safety Issues, Functional Mobility    Impairments Affecting Function (Mobility)  balance;endurance/activity tolerance  -MS       User Key  (r) = Recorded By, (t) = Taken  By, (c) = Cosigned By    Initials Name Provider Type    MS Nicole Olson R, PT, DPT, NCS Physical Therapist        Mobility     Row Name 03/04/21 1400 03/04/21 0900       Bed Mobility    Bed Mobility  sit-supine  -MS  rolling left;rolling right;sidelying-sit  -MS    Rolling Left [place] (Bed Mobility)  --  minimum assist (75% patient effort);verbal cues;nonverbal cues (demo/gesture)  -MS    Rolling Right [place] (Bed Mobility)  --  minimum assist (75% patient effort);verbal cues;nonverbal cues (demo/gesture)  -MS    Sit-Supine [place] (Bed Mobility)  moderate assist (50% patient effort);verbal cues;nonverbal cues (demo/gesture) assist for B LEs  -MS  --    Sidelying-Sit [place] (Bed Mobility)  --  minimum assist (75% patient effort);2 person assist;verbal cues;nonverbal cues (demo/gesture)  -MS    Assistive Device (Bed Mobility)  --  bed rails  -MS    Comment (Bed Mobility)  --  assist to prevent R LE to adduct during transfer  -MS    Row Name 03/04/21 1400 03/04/21 0900       Sit-Stand Transfer    Sit-Stand [place] (Transfers)  contact guard  -MS  contact guard;verbal cues;nonverbal cues (demo/gesture)  -MS    Assistive Device (Sit-Stand Transfers)  walker, front-wheeled  -MS  walker, front-wheeled  -MS    Row Name 03/04/21 1400 03/04/21 0900       Gait/Stairs (Locomotion)    [place] Level (Gait)  contact guard  -MS  contact guard;verbal cues  -MS    Assistive Device (Gait)  walker, front-wheeled  -MS  walker, front-wheeled  -MS    Distance in Feet (Gait)  50ft with significant trendelenberg on R, R LE ER, pt fatigues with more flexed forward gait pattern toward end of gait  -MS  50ft with significant trendelenberg on R as expected from previous injuries/surgeries. R LE external rotation at all times during gait.  -MS    Deviations/Abnormal Patterns (Gait)  --  antalgic;base of support, wide;rush decreased;steppage  -MS    Right Sided Gait Deviations  --  Trendelenburg sign  -MS     Row Name 03/04/21 1400 03/04/21 0900       Mobility    Extremity Weight-bearing Status  right lower extremity  -MS  right lower extremity  -MS    Right Lower Extremity (Weight-bearing Status)  weight-bearing as tolerated (WBAT)  -MS  weight-bearing as tolerated (WBAT) Per Silverio HERMAN for Dr. Carlson  -MS      User Key  (r) = Recorded By, (t) = Taken By, (c) = Cosigned By    Initials Name Provider Type    MS Nicole Olson R, PT, DPT, NCS Physical Therapist        Obj/Interventions     Row Name 03/04/21 0900          Range of Motion Comprehensive    Comment, General Range of Motion  AROM: R hip flexion impaired 60%, PROM/AROM: R knee flexion impaired 75% chronically. Limited R hip abd/adduction to protect glut min and med repair.  -MS     Row Name 03/04/21 0900          Strength Comprehensive (MMT)    Comment, General Manual Muscle Testing (MMT) Assessment  R hip flexion 2+/5, R knee ext 5/5, R knee flex 4/5 within available range, R dorsiflexion 3+/5, R EHL 3/5, R plantarflexion 3/5. L hip flexion 4/5, L knee ext/flex 5/5, L ankle dorsiflexion 4/5, plantarflexion 4/5, L EHL 4/5  -MS     Row Name 03/04/21 0900          Motor Skills    Motor Skills  coordination  -MS     Coordination  -- difficult to test R UE due to art line, R LE limited by previous surgery complications, L LE WFL, L UE WFL. PT reports difficulty with using R UE to eat  -MS     Row Name 03/04/21 0900          Balance    Balance Assessment  sitting static balance;standing static balance;standing dynamic balance  -MS     Static Sitting Balance  WNL;sitting, edge of bed  -MS     Static Standing Balance  mild impairment pt able to stand unnassisted for 20 sec but slowly sinks into a forward flexed gait  -MS     Dynamic Standing Balance  mild impairment;supported  -MS     Row Name 03/04/21 0900          Sensory Assessment (Somatosensory)    Sensory Assessment (Somatosensory)  LE sensation intact  -MS       User Key  (r) = Recorded By, (t) = Taken By, (c)  = Cosigned By    Initials Name Provider Type    Nicole Palacios EVERARDO, PT, DPT, NCS Physical Therapist        Goals/Plan     Row Name 03/04/21 0900          Bed Mobility Goal 1 (PT)    Activity/Assistive Device (Bed Mobility Goal 1, PT)  sit to supine;supine to sit  -MS     [place] Level/Cues Needed (Bed Mobility Goal 1, PT)  independent  -MS     Time Frame (Bed Mobility Goal 1, PT)  long term goal (LTG);by discharge  -MS     Progress/Outcomes (Bed Mobility Goal 1, PT)  goal ongoing  -MS     Row Name 03/04/21 0900          Transfer Goal 1 (PT)    Activity/Assistive Device (Transfer Goal 1, PT)  sit-to-stand/stand-to-sit;bed-to-chair/chair-to-bed;walker, rolling  -MS     [place] Level/Cues Needed (Transfer Goal 1, PT)  modified independence  -MS     Time Frame (Transfer Goal 1, PT)  long term goal (LTG);by discharge  -MS     Progress/Outcome (Transfer Goal 1, PT)  goal ongoing  -MS     Row Name 03/04/21 0900          Gait Training Goal 1 (PT)    Activity/Assistive Device (Gait Training Goal 1, PT)  gait (walking locomotion);assistive device use;decrease fall risk;improve balance and speed;increase endurance/gait distance;walker, rolling  -MS     [place] Level (Gait Training Goal 1, PT)  contact guard assist;tactile cues required;verbal cues required  -MS     Distance (Gait Training Goal 1, PT)  75ft maintaining upright posture and use of RW only for balance stability, not for weight bearing  -MS     Time Frame (Gait Training Goal 1, PT)  long term goal (LTG);by discharge  -MS     Progress/Outcome (Gait Training Goal 1, PT)  goal ongoing  -MS       User Key  (r) = Recorded By, (t) = Taken By, (c) = Cosigned By    Initials Name Provider Type    Nicole Palacios EVERARDO, PT, DPT, NCS Physical Therapist        Clinical Impression     Row Name 03/04/21 1400 03/04/21 0900       Pain    Additional Documentation  Pain Scale: Numbers Pre/Post-Treatment (Group)  -MS  Pain Scale: Numbers Pre/Post-Treatment (Group)  -MS     Row Name 03/04/21 1400 03/04/21 0900       Pain Scale: Numbers Pre/Post-Treatment    Pretreatment Pain Rating  5/10  -MS  3/10  -MS    Posttreatment Pain Rating  5/10  -MS  3/10  -MS    Pain Location - Orientation  --  lower  -MS    Pain Location  neck  -MS  back  -MS    Pre/Posttreatment Pain Comment  nursing to give pain medication  -MS  --    Pain Intervention(s)  Repositioned;Ambulation/increased activity  -MS  Medication (See MAR);Repositioned;Ambulation/increased activity  -MS    Row Name 03/04/21 1400 03/04/21 0900       Plan of Care Review    Plan of Care Reviewed With  patient  -MS  patient  -MS    Progress  no change  -MS  improving  -MS    Outcome Summary  Pt found still sitting in chair. She states she has not been up since PT left this morning. She also states that she has not voided and does not feel the need to void. She sat on the BSC and urinated minimally. Nursing notified and will bladder scan. The patient's wound vac machine was switched out from the LTACH machine to a BHP machine. The LTACH wound vac was returned to LTACH.  -MS  PT re-evaluation completed s/p cervical surgery. The patient has been cleared by ortho (per Silverio HERMAN for Dr. Carlson) to be WBAT on the R LE since she is 8 weeks out from her initial surgery. The patient has a wound vac intact on her R hip I and D wound that PT will address tomorrow with plans to switch to an instillation wound vac to help with healing. At this time it was difficult to test coordination on the R UE due to her art line, but the patient does report difficulty with using her R hand for feeding. Further testing will be done at a later date. Testing for the R LE is difficult as well due to her prior complications from multiple orthropedic surgeries. She does have significant ROM limitations of the R knee and hip and weakness that is to be expected. Her L LE demonstrates full ROM with minimal weakness that is focused in her ankle and EHL strength. Her  sensation is intact throughout and her pain is only a 3/10 in her back at this time. She was able to walk about 50ft with a significant trendelenberg sign on the R as expected. She will benefit from continued PT to work on strengthening and activity tolerance. We will continue to monitor for any neurological changes or changes in her R hip since she is now WBAT.  -MS    Row Name 03/04/21 0900          Therapy Assessment/Plan (PT)    Patient/Family Therapy Goals Statement (PT)  walk more  -MS     Rehab Potential (PT)  good, to achieve stated therapy goals  -MS     Criteria for Skilled Interventions Met (PT)  yes;meets criteria;skilled treatment is necessary  -MS     Predicted Duration of Therapy Intervention (PT)  until discharge  -MS     Row Name 03/04/21 0900          Vital Signs    O2 Delivery Pre Treatment  supplemental O2  -MS     O2 Delivery Intra Treatment  room air  -MS     Post SpO2 (%)  98  -MS     O2 Delivery Post Treatment  room air  -MS     Row Name 03/04/21 1400 03/04/21 0900       Positioning and Restraints    Post Treatment Position  --  chair  -MS    In Bed  notified nsg;fowlers;call light within reach;encouraged to call for assist;side rails up x2  -MS  --    In Chair  --  reclined;notified nsg;call light within reach;encouraged to call for assist;with brace  -MS      User Key  (r) = Recorded By, (t) = Taken By, (c) = Cosigned By    Initials Name Provider Type    Nicole Palacios R, PT, DPT, NCS Physical Therapist        Outcome Measures     Row Name 03/04/21 0900          How much help from another person do you currently need...    Turning from your back to your side while in flat bed without using bedrails?  3  -MS     Moving from lying on back to sitting on the side of a flat bed without bedrails?  3  -MS     Moving to and from a bed to a chair (including a wheelchair)?  3  -MS     Standing up from a chair using your arms (e.g., wheelchair, bedside chair)?  3  -MS     Climbing 3-5 steps with a  railing?  1  -MS     To walk in hospital room?  3  -MS     AM-PAC 6 Clicks Score (PT)  16  -MS     Row Name 03/04/21 0900          Functional Assessment    Outcome Measure Options  AM-PAC 6 Clicks Basic Mobility (PT)  -MS       User Key  (r) = Recorded By, (t) = Taken By, (c) = Cosigned By    Initials Name Provider Type    MS Nicole Olson, PT, DPT, NCS Physical Therapist        Physical Therapy Education                 Title: PT OT SLP Therapies (In Progress)     Topic: Physical Therapy (In Progress)     Point: Mobility training (Done)     Learning Progress Summary           Patient Acceptance, E, VU by MS at 3/4/2021 1110    Comment: role of PT in her care, spinal restrictions, new WBAT on R LE                   Point: Home exercise program (Not Started)     Learner Progress:  Not documented in this visit.          Point: Body mechanics (Not Started)     Learner Progress:  Not documented in this visit.          Point: Precautions (Done)     Learning Progress Summary           Patient Acceptance, E, VU by MS at 3/4/2021 1110    Comment: role of PT in her care, spinal restrictions, new WBAT on R LE                               User Key     Initials Effective Dates Name Provider Type Discipline    MS 06/19/18 -  Nicole Olson EVERARDO, PT, DPT, NCS Physical Therapist PT              PT Recommendation and Plan  Planned Therapy Interventions (PT): balance training, bed mobility training, gait training, patient/family education, orthotic fitting/training, strengthening, transfer training  Plan of Care Reviewed With: patient  Progress: no change  Outcome Summary: Pt found still sitting in chair. She states she has not been up since PT left this morning. She also states that she has not voided and does not feel the need to void. She sat on the BSC and urinated minimally. Nursing notified and will bladder scan. The patient's wound vac machine was switched out from the LTACH machine to a BHP machine. The LTACH wound vac was  returned to [facility].     Time Calculation:   PT Charges     Row Name 03/04/21 1500 03/04/21 0900          Time Calculation    Start Time  1400  -MS  0900  -MS     Stop Time  1440  -MS  1005  -MS     Time Calculation (min)  40 min  -MS  65 min  -MS     PT Received On  --  03/04/21  -MS     PT Goal Re-Cert Due Date  --  03/18/21  -MS        Time Calculation- PT    Total Timed Code Minutes- PT  40 minute(s)  -MS  30 minute(s)  -MS        Timed Charges    77889 - Gait Training Minutes   10  -MS  --     35370 - PT Therapeutic Activity Minutes  30  -MS  30  -MS       User Key  (r) = Recorded By, (t) = Taken By, (c) = Cosigned By    Initials Name Provider Type    MS Nicole Olson, PT, DPT, NCS Physical Therapist        Therapy Charges for Today     Code Description Service Date Service Provider Modifiers Qty    10854094300 HC PT THERAPEUTIC ACT EA 15 MIN 3/4/2021 Nicole Olson PT, DPT, NCS GP 2    67658925387 HC PT RE-EVAL ESTABLISHED PLAN 2 3/4/2021 Nicole Olson, PT, DPT, NCS GP 1    89450243948 HC PT THERAPEUTIC ACT EA 15 MIN 3/4/2021 Nicole Olson, PT, DPT, NCS GP 2    73827471881 HC GAIT TRAINING EA 15 MIN 3/4/2021 Nicole Olson, PT, DPT, NCS GP 1          PT G-Codes  Outcome Measure Options: AM-PAC 6 Clicks Basic Mobility (PT)  AM-PAC 6 Clicks Score (PT): 16  AM-PAC 6 Clicks Score (OT): 19    Nicole Olson PT, DPT, NCS  3/4/2021

## 2021-03-04 NOTE — PLAN OF CARE
Goal Outcome Evaluation:  Plan of Care Reviewed With: patient  Progress: improving  Outcome Summary: PT re-evaluation completed s/p cervical surgery. The patient has been cleared by ortho (per Silverio HERMAN for Dr. Carlson) to be WBAT on the R LE since she is 8 weeks out from her initial surgery. The patient has a wound vac intact on her R hip I and D wound that PT will address tomorrow with plans to switch to an instillation wound vac to help with healing. At this time it was difficult to test coordination on the R UE due to her art line, but the patient does report difficulty with using her R hand for feeding. Further testing will be done at a later date. Testing for the R LE is difficult as well due to her prior complications from multiple orthropedic surgeries. She does have significant ROM limitations of the R knee and hip and weakness that is to be expected. Her L LE demonstrates full ROM with minimal weakness that is focused in her ankle and EHL strength. Her sensation is intact throughout and her pain is only a 3/10 in her back at this time. She was able to walk about 50ft with a significant trendelenberg sign on the R as expected. She will benefit from continued PT to work on strengthening and activity tolerance. We will continue to monitor for any neurological changes or changes in her R hip since she is now WBAT.

## 2021-03-04 NOTE — BRIEF OP NOTE
CERVICAL CORPECTOMY  Progress Note    Tawny Shin  3/3/2021    Pre-op Diagnosis:   Stenosis of cervical spine with myelopathy (CMS/HCC) [M48.02, G99.2]  Spinal stenosis, lumbar region, with neurogenic claudication [M48.062]  Discitis of lumbar region [M46.46]  Osteomyelitis of lumbar spine (CMS/HCC) [M46.26]       Post-Op Diagnosis Codes:     * Stenosis of cervical spine with myelopathy (CMS/HCC) [M48.02, G99.2]     * Spinal stenosis, lumbar region, with neurogenic claudication [M48.062]     * Discitis of lumbar region [M46.46]     * Osteomyelitis of lumbar spine (CMS/HCC) [M46.26]    Procedure/CPT® Codes:        Procedure(s):  CERVICAL 6 CORPECTOMY WITH TITANIUM CAGE WITH NEURO MONITORING    Surgeon(s):  Bandar Shea MD    Anesthesia: General    Staff:   Circulator: Ewelina Dean RN; Benjie Motta RN  Scrub Person: Gillian De La Cruz; Maru Srinivasan  Assistant: Shaista Mena RN; Darlin Barragan  Assistant: Shaista Mena RN; Darlin Barragan      Estimated Blood Loss: 100 cc    Urine Voided: * No values recorded between 3/3/2021  1:32 PM and 3/3/2021  6:41 PM *    Specimens:                None          Drains:   Closed/Suction Drain 1 Anterior Neck Bulb 10 Fr. (Active)       Urethral Catheter Silicone 16 Fr. (Active)       Findings: Good decompression and neuromonitoring improved    Complications: None    Assistant: Shaista Mena RN; Darlin Barragan  was responsible for performing the following activities: Retraction, Suction and Irrigation and their skilled assistance was necessary for the success of this case.    Bandar Shea MD     Date: 3/3/2021  Time: 18:41 CST

## 2021-03-04 NOTE — PROGRESS NOTES
NEUROSURGERY DAILY PROGRESS NOTE    ASSESSMENT:   Tawny Shin is a 65 y.o. with a significant comorbidity rheumatoid arthritis on disease modifying agents, prior lumbar fusion by Dr. Oropeza in 2018.  She presents with a new problem of postop surgical infection of her right hip with wound VAC dressing, progressive right lower extremity proximal weakness without numbness or radiculopathy. Physical exam findings of right iliopsoas weakness, and global hyperreflexia with Babinski and right Patience's and decreased  strength bilaterally.  Their imaging shows CT of the cervical spine with severe cervical stenosis at severe stenosis C5-6 and C6/7 and adjacent segment disease at L1/2 and L2/3 with concern for osteomyelitis discitis.    Past Medical History:   Diagnosis Date   • Arthritis    • Asthma    • Chronic sinusitis    • Coronary artery disease    • Eustachian tube dysfunction    • Heart disease    • Herpes simplex    • Hyperlipidemia    • Hypertension    • Knee dislocation    • Labral tear of right hip joint    • Laryngitis sicca    • Laryngitis, chronic    • MI (myocardial infarction) (CMS/HCC)    • Otorrhea    • Sensorineural hearing loss    • Sjogren's disease (CMS/HCC)      Active Hospital Problems    Diagnosis   • Stenosis of cervical spine with myelopathy (CMS/HCC)     Added automatically from request for surgery [id number]     • Spinal stenosis, lumbar region, with neurogenic claudication     Added automatically from request for surgery [id number]     • Discitis of lumbar region     Added automatically from request for surgery [id number]     • Osteomyelitis of lumbar spine (CMS/HCC)     Added automatically from request for surgery [id number]       PLAN:   Neuro: Stable.  Improved  strength.  Improved right lower extremity strength     Cervical stenosis    POD #1 C6 corpectomy   GUILLERMO =25 mL   Cervical collar in place.  Trachea midline.  No difficulty with swallowing   Postop x-rays pending   Continue neurochecks  "every 4 hours     Right psoas abscess   I&D right psoas abscess performed per IR (3/2/2021)   Body fluid culture: 4+ WBCs, rare 1+ gram-positive cocci   Light growth 2+ Staph aureus   Continue antibiotics per ID    Concern for discitis/osteomyelitis L1/2  L1/2 severe central canal stenosis   We will continue to monitor for now   May require L1 laminectomy with possible discectomy, foraminotomy, and debridement in the near future   Continue antibiotics per ID     CV: Continuous monitoring.  Currently requiring low-dose Cardene to keep SBP <140.   SBP/A-line 144-150   Advance oral antihypertensives.  Pulm: No acute issues.  Maintaining O2 sat.  Continuous pulse oximetry   : Remove Horan ASAP.  Bladder scans and I/O cath per policy  FEN: Resume regular diet as tolerable  GI: Prophylaxis  ORTHO:  MSSA infection, wound vac per PT   3/4/2021: Scant growth Staphylococcus aureus        Scant growth Enterococcus     ID: MSSA bacteria.  Continue cefazolin per ID.  Postop vancomycin  ORTHO: Heme:  DVT prophylaxis; SCD's  Pain: Tolerable at present, DC PCA and switch to oral meds   Dispo: PT/OT.       CHIEF COMPLAINT:   Right-sided lumbar back pain with a new complaint of right anterior thigh pain    Subjective  Symptom stable.  Temp:  [97.6 °F (36.4 °C)-98.4 °F (36.9 °C)] 97.6 °F (36.4 °C)  Heart Rate:  [67-84] 69  Resp:  [16-22] 21  BP: (110-176)/(62-95) 140/69  Arterial Line BP: (134-185)/(55-80) 149/59    Objective:  General Appearance:  Comfortable, well-appearing and in no acute distress.    Vital signs: (most recent): Blood pressure 140/69, pulse 69, temperature 97.6 °F (36.4 °C), temperature source Oral, resp. rate 21, height 165.1 cm (65\"), weight 98.2 kg (216 lb 7.9 oz), SpO2 98 %, not currently breastfeeding.      Neurologic Exam     Mental Status   Oriented to person, place, and time.   Attention: normal. Concentration: normal.   Speech: speech is normal   Level of consciousness: alert    Bright and awake.  " Oriented x3.  Follows commands without prompting showing thumbs up into fingers bilaterally.     Cranial Nerves     CN II   Visual fields full to confrontation.     CN III, IV, VI   Pupils are equal, round, and reactive to light.  Extraocular motions are normal.     CN V   Facial sensation intact.     CN VII   Facial expression full, symmetric.     CN VIII   CN VIII normal.     CN IX, X   CN IX normal.     CN XI   CN XI normal.     Motor Exam     Strength   Right deltoid: 5/5  Left deltoid: 5/5  Right biceps: 5/5  Left biceps: 5/5  Right triceps: 5/5  Left triceps: 5/5  Right wrist extension: 5/5  Left wrist extension: 5/5  Right iliopsoas: 2/5  Left iliopsoas: 5/5  Right quadriceps: 2/5  Left quadriceps: 5/5  Right anterior tibial: 3/5  Left anterior tibial: 5/5  Right gastroc: 4/5  Left gastroc: 5/5       Sensory Exam   Sensory deficit distribution on right: L1    Gait, Coordination, and Reflexes     Tremor   Resting tremor: absent  Intention tremor: absent  Action tremor: absent    Reflexes   Right plantar: upgoing  Left plantar: normal  Right Mims: present  Left Mims: absent  Right ankle clonus: present  Left ankle clonus: present  Right pendular knee jerk: absent  Left pendular knee jerk: absent    Drains: * No LDAs found *    Imaging Results (Last 24 Hours)     Procedure Component Value Units Date/Time    XR Spine Cervical 2 View [[id number]] Collected: 03/03/21 1844     Updated: 03/04/21 0706    Narrative:      EXAMINATION:   XR SPINE CERVICAL 2 VW-  3/3/2021 6:44 PM CST     HISTORY: XR SPINE CERVICAL 2 VW- 3/3/2021 2:00 PM CST     HISTORY: corpectomy; M48.02-Spinal stenosis, cervical region;  G99.2-Myelopathy in diseases classified elsewhere; R26.9-Unspecified  abnormalities of gait and mobility; M48.062-Spinal stenosis, lumbar  region with neurogenic claudication; M46.46-Discitis, unspecified,  lumbar region; M46.26-Osteomyelitis of vertebra, lumbar region;  R78.81-Bacteremia; B95.61-Methicillin  susceptible Staphylococcus aureus  infect     COMPARISON: None     FLUOROSCOPY TIME: 35.6 seconds     FLUOROSCOPY DOSE: 3.73 mGy     NUMBER OF IMAGES: 2       Impression:         Intraoperative fluoroscopic images during anterior fusion and C6  cervical corpectomy.     Please refer to the operative note for more details.  This report was finalized on 03/03/2021 18:45 by Dr. Chai Francisco MD.    FL C Arm During Surgery [[id number]] Collected: 03/03/21 1844     Updated: 03/04/21 0706    Narrative:      EXAMINATION:   XR SPINE CERVICAL 2 VW-  3/3/2021 6:44 PM CST     HISTORY: XR SPINE CERVICAL 2 VW- 3/3/2021 2:00 PM CST     HISTORY: corpectomy; M48.02-Spinal stenosis, cervical region;  G99.2-Myelopathy in diseases classified elsewhere; R26.9-Unspecified  abnormalities of gait and mobility; M48.062-Spinal stenosis, lumbar  region with neurogenic claudication; M46.46-Discitis, unspecified,  lumbar region; M46.26-Osteomyelitis of vertebra, lumbar region;  R78.81-Bacteremia; B95.61-Methicillin susceptible Staphylococcus aureus  infect     COMPARISON: None     FLUOROSCOPY TIME: 35.6 seconds     FLUOROSCOPY DOSE: 3.73 mGy     NUMBER OF IMAGES: 2       Impression:         Intraoperative fluoroscopic images during anterior fusion and C6  cervical corpectomy.     Please refer to the operative note for more details.  This report was finalized on 03/03/2021 18:45 by Dr. Chai Francisco MD.        Lab Results (last 24 hours)     Procedure Component Value Units Date/Time    Body Fluid Culture - Body Fluid, Back, Lower [[id number]]  (Abnormal) Collected: 03/02/21 1515    Specimen: Body Fluid from Back, Lower Updated: 03/04/21 0750     Body Fluid Culture Light growth (2+) Staphylococcus aureus     Gram Stain Many (4+) WBCs seen      Rare (1+) Gram positive cocci    Wound Culture - Wound, Hip, Right [[id number]]  (Abnormal) Collected: 03/01/21 1001    Specimen: Wound from Hip, Right Updated: 03/04/21 0744     Wound Culture Scant growth  (1+) Staphylococcus aureus      Scant growth (1+) Mixed Gram Negative Selina      Scant growth (1+) Enterococcus species      Scant growth (1+) Normal Skin Selina     Gram Stain Few (2+) WBCs seen      No organisms seen    Narrative:            Basic Metabolic Panel [[id number]]  (Abnormal) Collected: 03/04/21 0315    Specimen: Blood Updated: 03/04/21 0422     Glucose 145 mg/dL      BUN 11 mg/dL      Creatinine 0.35 mg/dL      Sodium 137 mmol/L      Potassium 4.4 mmol/L      Chloride 103 mmol/L      CO2 26.0 mmol/L      Calcium 8.9 mg/dL      eGFR Non African Amer >150 mL/min/1.73      BUN/Creatinine Ratio 31.4     Anion Gap 8.0 mmol/L     Narrative:      GFR Normal >60  Chronic Kidney Disease <60  Kidney Failure <15      C-reactive Protein [[id number]]  (Abnormal) Collected: 03/04/21 0315    Specimen: Blood Updated: 03/04/21 0422     C-Reactive Protein 10.52 mg/dL     CBC & Differential [[id number]]  (Abnormal) Collected: 03/04/21 0315    Specimen: Blood Updated: 03/04/21 0404    Narrative:      The following orders were created for panel order CBC & Differential.  Procedure                               Abnormality         Status                     ---------                               -----------         ------                     CBC Auto Differential[[id number]]        Abnormal            Final result                 Please view results for these tests on the individual orders.    CBC Auto Differential [[id number]]  (Abnormal) Collected: 03/04/21 0315    Specimen: Blood Updated: 03/04/21 0404     WBC 3.46 10*3/mm3      RBC 3.34 10*6/mm3      Hemoglobin 9.5 g/dL      Hematocrit 29.1 %      MCV 87.1 fL      MCH 28.4 pg      MCHC 32.6 g/dL      RDW 14.8 %      RDW-SD 47.0 fl      MPV 10.2 fL      Platelets 221 10*3/mm3      Neutrophil % 75.1 %      Lymphocyte % 21.1 %      Monocyte % 2.3 %      Eosinophil % 0.0 %      Basophil % 0.3 %      Immature Grans % 1.2 %      Neutrophils, Absolute 2.60 10*3/mm3       Lymphocytes, Absolute 0.73 10*3/mm3      Monocytes, Absolute 0.08 10*3/mm3      Eosinophils, Absolute 0.00 10*3/mm3      Basophils, Absolute 0.01 10*3/mm3      Immature Grans, Absolute 0.04 10*3/mm3      nRBC 0.0 /100 WBC     Sedimentation Rate [[id number]]  (Abnormal) Collected: 03/04/21 0315    Specimen: Blood Updated: 03/04/21 0402     Sed Rate 68 mm/hr     AFB Culture - Aspirate, Back, Lower [[id number]] Collected: 03/02/21 1515    Specimen: Aspirate from Back, Lower Updated: 03/03/21 1357     AFB Stain No acid fast bacilli seen on direct smear        Taiwo Prado, APRN

## 2021-03-04 NOTE — PROGRESS NOTES
"Pharmacy Dosing Service  Pharmacokinetics  Vancomycin Initial Evaluation  Assessment/Action/Plan:  Current Order: Vancomycin 1000 mg IVPB every once then 1250 mg IVPB every 12 hours x 5 days  Current end date: 03/08/2021  Levels: Obtain Vancomycin trough prior to dose on 3/5 at 0500  Additional antimicrobial agent(s): Cefazolin    Vancomycin dosage initiated based on population pharmacokinetic parameters. Pharmacy will continue to follow daily and adjust dose accordingly.     Subjective:  Tawny Shin is a 65 y.o. female with a Vancomycin \"Pharmacy to Dose\" consult for the treatment of Bacteremia, Skin and Soft Tissue Infection and Surgical prophylaxis , day 1 of 5 of treatment.    AUC Model Data:  Loading dose: N/A  Regimen: 1250 mg every 12 hours for 10 doses.  Start time: 22:15 on 03/03/2021  Exposure target: AUC24 (range) 400-600 mg/L.hr  AUC24,ss: 465 mg/L.hr  PAUC*: 61 %  Ctrough,ss: 12 mg/L  Pconc*: 27 %  Tox.: 7 %      Objective:  Ht: 165.1 cm (65\"); Wt: 102 kg (225 lb 3.2 oz)  Estimated Creatinine Clearance: 80.8 mL/min (A) (by C-G formula based on SCr of 0.46 mg/dL (L)).   Creatinine   Date Value Ref Range Status   02/26/2021 0.46 (L) 0.57 - 1.00 mg/dL Final   02/25/2021 0.44 (L) 0.57 - 1.00 mg/dL Final   02/23/2021 0.44 (L) 0.57 - 1.00 mg/dL Final   07/08/2020 0.90 0.60 - 1.30 mg/dL Final     Comment:     Serial Number: [id number]:  [id number]   09/03/2019 0.7 0.5 - 0.9 mg/dL Final   01/14/2019 0.5 0.5 - 0.9 mg/dL Final   01/07/2019 0.6 0.5 - 0.9 mg/dL Final      Lab Results   Component Value Date    WBC 5.29 03/03/2021    WBC 6.10 03/02/2021    WBC 6.52 03/01/2021      Baseline culture results:  Microbiology Results (last 10 days)       Procedure Component Value - Date/Time    AFB Culture - Aspirate, Back, Lower [[id number]] Collected: 03/02/21 1515    Lab Status: Preliminary result Specimen: Aspirate from Back, Lower Updated: 03/03/21 1357     AFB Stain No acid fast bacilli seen on direct smear    " Body Fluid Culture - Body Fluid, Back, Lower [[id number]] Collected: 03/02/21 1515    Lab Status: Preliminary result Specimen: Body Fluid from Back, Lower Updated: 03/03/21 0600     Body Fluid Culture Abnormal Stain     Gram Stain Many (4+) WBCs seen      Rare (1+) Gram positive cocci    KOH Prep - Aspirate, Back, Lower [[id number]] Collected: 03/02/21 1515    Lab Status: Final result Specimen: Aspirate from Back, Lower Updated: 03/02/21 1838     KOH Prep No yeast or hyphal elements seen    COVID-19, [facility] IN-HOUSE,NASAL Swab (NO TRANSPORT MEDIA) 2 HR TAT - Swab, Nasopharynx [[id number]]  (Normal) Collected: 03/02/21 1100    Lab Status: Final result Specimen: Swab from Nasopharynx Updated: 03/02/21 1134     COVID19 Presumptive Negative    Narrative:      Fact sheet for providers: https://www.fda.gov/media/594028/download     Fact sheet for patients: https://www.fda.gov/media/905534/download    Test performed by PCR.  If inconsistent with clinical signs and symptoms patient should be tested with different authorized molecular test.    Wound Culture - Wound, Hip, Right [[id number]]  (Abnormal) Collected: 03/01/21 1001    Lab Status: Preliminary result Specimen: Wound from Hip, Right Updated: 03/03/21 1258     Wound Culture Scant growth (1+) Gram Positive Cocci      Scant growth (1+) Mixed Gram Negative Selina     Gram Stain Few (2+) WBCs seen      No organisms seen    Narrative:      Organism under investigation.              Samantha Sanders, PharmD  03/03/21 21:21 CST

## 2021-03-04 NOTE — OP NOTE
NEUROSURGERY OPERATIVE NOTE    Tawny Shin  OR Date: 3/3/2021    Pre-op Diagnosis:   Stenosis of cervical spine with myelopathy (CMS/HCC) [M48.02, G99.2]  Spinal stenosis, lumbar region, with neurogenic claudication [M48.062]  Discitis of lumbar region [M46.46]  Osteomyelitis of lumbar spine (CMS/HCC) [M46.26]    Post-op Diagnosis:     Post-Op Diagnosis Codes:     * Stenosis of cervical spine with myelopathy (CMS/HCC) [M48.02, G99.2]     * Spinal stenosis, lumbar region, with neurogenic claudication [M48.062]     * Discitis of lumbar region [M46.46]     * Osteomyelitis of lumbar spine (CMS/HCC) [M46.26]          Surgeon(s):  Bandar Shea MD    Anesthesia: General    Staff:   Circulator: Ewelina Dean RN; Benjie Motta RN  Scrub Person: Gillian De La Cruz; Maru Srinivasan  Assistant: Shaista Mena RN; Darlin Barragan    Procedure(s):  CERVICAL 6 CORPECTOMY WITH TITANIUM CAGE WITH NEURO MONITORING    OPERATIVE PROCEDURES  Corpectomy  1.  Vertebral corpectomy, anterior approach with decompression of the spinal cord and nerve roots; cervical, single segment.  Arthrodesis  2.  Arthrodesis, anterior interbody, including disc space preparation, cervical below C2   3. Arthrodesis, cervical below C2, each additional interspace.   Bone Graft  4. Utilization of aurograft for purposes of spinal fusion  Spinal Instrumentation  5. Application of anterior cervical locking plate   6.  Application of expandable interbody spacer.    OPERATIVE INDICATIONS:   Tawny Shin is a 65 y.o. with a significant comorbidity rheumatoid arthritis on disease modifying agents, prior lumbar fusion by Dr. Oropeza in 2018.  She presents with a new problem of postop surgical infection of her right hip with wound VAC dressing, progressive right lower extremity proximal weakness without numbness or radiculopathy. Physical exam findings of right iliopsoas weakness, and global hyperreflexia with Babinski and right Patience's and  decreased  strength bilaterally.  Their imaging shows CT of the cervical spine with severe cervical stenosis at severe stenosis C5-6 and C6/7 and adjacent segment disease at L1/2 and L2/3 with concern for osteomyelitis discitis at L1/2.    We reviewed the surgery itself as well as risks including infection, injury to blood vessels or nerves, leakage of spinal fluid, weakness or paralysis, failure of the pain to improve, possible worsening of the pain, failure of the neurologic symptoms to improve, possible worsening of the neurologic symptoms, and possible need for further surgery including re-revision and/or removal.  Furthermore I explained that the fusion may not become solid or that the hardware could break. We discussed various techniques available for obtaining fusion including anterior and posterior approaches and I recommended allograft and plate fixation. Furthermore, I explained that removing motion at the fusion sites will transfer stress to other disc levels possibly accelerating their degeneration and causing additional symptoms and/or necessitating additional surgery in the future.    OPERATIVE TECHNIQUE:   On the day of surgery, the patient was brought to the preoperative holding area where IV access was obtained. Prophylactic intravenous antibiotics were administered. The patient was then brought to the major operative suite. While on the [facility], the patient underwent an uneventful induction of general anesthetic with placement of endotracheal tube. TEDs, SCD hoses, and a Horan catheter were applied.      She was then positioned supine on the operating table, and all bony prominences were padded. The arms were carefully padded and tucked at the patient's sides. Baseline neuro monitoring was obtained.  A jaw bra with 5lbs of cervical traction was applied and neuro monitoring was compared to baseline and found to be stable.    The skin was prepped with alcohol and lateral fluoroscopy was  "used to identify the approximate incision site over C6.  The entire neck was then sterilely prepped and draped in the usual fashion with ChloraPrep and the skin was infused with quarter percent Marcaine with epinephrine.    Exposure  A transverse skin incision was made and carried down to the platysma muscle. This was then split in line with its fibers. Blunt dissection was carried down medial to the carotid sheath and lateral to the trachea and esophagus until the anterior cervical was visualized. A bayoneted spinal needle was placed into a disc and fluoroscopy confirmed C6/7 by counting down from C2.      The longus colli muscles were then elevated bilaterally with the Bovie cautery. Self-retaining retractors were placed deep to the longus colli muscle in an effort to avoid injury to the sympathetic chain.  Once retractors were placed the ET tube was de-inflated and then reinflated to \"just seal\".  [place] pins were then placed mid vertebral body at C5 and C7 and distracted. Again neuro monitoring was checked and found to be stable.  Anterior partial discectomy was performed at C5/6 and C6/7.  This included complete removal of the anterior annulus, nucleus, and posterior annulus.  A high-speed drill was then used to drill off posterior osteophytes.  The posterior longitudinal ligament was left in tact.      Corpectomy  A 4 mm ball bit was then used to enter the C6 vertebral body.  The trabecular bone was then removed completely leaving a shell of cortical bone remaining.  This was carried back to the posterior longitudinal ligament.  All drilling was performed with the posterior longitudinal ligament intact to protect the dura.  Next Kerrisons were brought into widen the exposure to approximately 15 mm wide.  Next pituitaries were used to remove the superior and inferior endplates.  Next bone wax was applied to the lateral walls of the vertebral body to achieve hemostasis.  Next a 2 mm Kerrison was used to remove " the remaining posterior cortical bone.  Next the posterior longitudinal ligament was removed starting in the mid vertebral body at the widest point of the canal.  We next moved laterally and then moved both superior and inferior towards the disc space.  Care was taken when removing the disc not to damage the underlying cord.  Next foraminotomies were then accomplished bilaterally with 2 mm Kerrisons bilaterally at both interspaces C5/6 and C6/7. Once all of this was accomplished, the blunt-tip probe was used to check for any residual compression.  During this procedure 80% of the vertebral body was removed.    After decompression it was noted that the motor evoked potentials improved in both the upper and lower extremities and SEPs remained stable.    Athrodesis  The superior and inferior endplates were prepared with a 3 mm high-speed side cutting bur. Bleeding cancellous bone was exposed in the mid vertebral body leaving the outer rigid cortical bone for vertebral body support.      Interbody Device  A 13 mm trial was then brought into the vertebral body defect and found to fit easily.  A caliper was then inserted into the vertebral body defect and fully expanded under fluoroscopy.  This resulted in a nice choosing a 18 to 24 mm Medtronic expandable titanium Stratosphere Cage.  The cage was then assembled on the back table.  Once fully assembled it was then inserted into the neck and found to fit snugly.  It was expanded.  Neuro monitoring was checked and there was no alterations.  Both implants were found to engage nicely with the superior and inferior endplates.  At this point the cage was removed and taken to the back table.  The ends were packed with graft on.  In the meantime the endplates were prepared.  The cartilaginous endplates were removed until bleeding bone was encountered.  At this point a 3 mm sidecutting bit was used to create a hole in the cancellus bone approximately where the graft on putty  packed into the expansile cage would be.  Next the cage was brought back into the field and inserted under fluoroscopy.  It was expanded to its maximal height and then locked into place.  Again neuro monitoring was run and found to be stable.  The mobile footplate was then locked into place with a torque wrench.    Locking Plate  Once all graft placements had been performed, an appropriate size Medtronic anterior cervical locking plate was chosen and bent into gentle lordosis. Two screws were then placed into each of the vertebral bodies at C5 and C7 using fluoroscopy. There was excellent purchase. A final x-ray was done confirming good position of the hardware and grafts. The locking mechanism was then applied.    Following a final copious irrigation, there was good hemostasis and no dural leaks. The carotid pulse was strong.     A 10-Wolof round drain was placed deep to the level of the platysma muscle and left at the level of the hardware. The wounds were then closed in layers using 3-0 Vicryl suture for the platysma muscle and subcutaneous tissue, and 4-0 Monocryl suture in a subcuticular skin closure. Dermabond was applied to the wound. The drain was hooked to bulb suction.     A hard cervical collar was applied.     The patient was then carefully returned to their hospital bed where the patient was reversed and extubated and taken to the recovery room having tolerated the procedure well.  All monitoring was noted to be stable throughout the case.    Estimated Blood Loss: 100ml    Complications: None.      Implants:   Implant Name Type Inv. Item Serial No.  Lot No. LRB No. Used Action   HEMOST ABS SURGIFOAM  8X12 10MM - [id number] Implant HEMOST ABS SURGIFOAM  8X12 10MM  [employer]  [employer] J [employer] J [id number] N/A 1 Implanted   CTR/PC CERV SZB 13MM - [id number] Implant CTR/PC CERV SZB 13MM  [employer]  N/A 1 Implanted   PLT ACP ATLANTIS VSN ELT 2LVL 37.5MM NS - [id number] Implant PLT ACP ATLANTIS  VSN ELT 2LVL 37.5MM NS  [employer]  N/A 1 Implanted   SCRW ATLANTIS VSN ELITE VA SD 4X15 GRN - [id number] Implant SCRW ATLANTIS VSN ELITE VA SD 4X15 GRN  [employer]  N/A 4 Implanted       Specimens:                None      Drains:   Closed/Suction Drain 1 Anterior Neck Bulb 10 Fr. (Active)   Dressing Status Clean;Dry;Intact 03/04/21 0330   Drainage Appearance Serosanguineous 03/04/21 0330   Status To bulb suction 03/04/21 0330   Output (mL) 10 mL 03/04/21 0339       Urethral Catheter Silicone 16 Fr. (Active)   Daily Indications Required activity restriction from trauma, surgery, (e.g. unstable spine, fracture, hemodynamics) 03/04/21 0330   Site Assessment Clean;Skin intact 03/04/21 0330   Collection Container Standard drainage bag 03/04/21 0330   Securement Method Securing device 03/04/21 0330   Catheter care complete Yes 03/04/21 0015   Output (mL) 425 mL 03/04/21 0800

## 2021-03-04 NOTE — PLAN OF CARE
Goal Outcome Evaluation:  Plan of Care Reviewed With: patient     Outcome Summary: OT re-eval complete s/p cervical sx. Pt Silverio HERMAN, pt is WBAT RLE. Per Dr. Shea, c-collar only. Pt with multiple incontinent episode throughout session. Pt reports that she knows when she needs to go but is unable to control bowels. She was able to complete all bed mobility with Min Ax2. Max A for perineal hygiene and changing pad during toileting. CGA for sit <> stand t/f and functional mobility with rwx. Displays a forward flexed posture during mobility, was able to correct approx 30 seconds at a time with vcs. She reports decreased use of R UE for self-feeding tasks, unable to complete further testing at this time d/t art line. Will continue to assess R UE strength and coordination. She would benefit from skilled OT services to address these deficits and assist with return to PLOF. Anticipate d/c back to [facility].

## 2021-03-04 NOTE — PLAN OF CARE
Problem: Adult Inpatient Plan of Care  Goal: Plan of Care Review  Outcome: Ongoing, Progressing  Flowsheets (Taken 3/4/2021 0522)  Progress: improving  Plan of Care Reviewed With: patient  Goal: Patient-Specific Goal (Individualized)  Outcome: Ongoing, Progressing  Goal: Absence of Hospital-Acquired Illness or Injury  Outcome: Ongoing, Progressing  Intervention: Identify and Manage Fall Risk  Recent Flowsheet Documentation  Taken 3/4/2021 0330 by Brenna Ortega RN  Safety Promotion/Fall Prevention:   safety round/check completed   fall prevention program maintained  Taken 3/4/2021 0200 by Brenna Ortega RN  Safety Promotion/Fall Prevention:   safety round/check completed   fall prevention program maintained  Taken 3/4/2021 0015 by Brenna Ortega RN  Safety Promotion/Fall Prevention:   safety round/check completed   nonskid shoes/slippers when out of bed   fall prevention program maintained   assistive device/personal items within reach   clutter free environment maintained  Taken 3/3/2021 2230 by Brenna Ortega RN  Safety Promotion/Fall Prevention:   fall prevention program maintained   safety round/check completed  Taken 3/3/2021 2020 by Brenna Ortega RN  Safety Promotion/Fall Prevention:   assistive device/personal items within reach   clutter free environment maintained   fall prevention program maintained   lighting adjusted   room organization consistent   safety round/check completed  Intervention: Prevent Skin Injury  Recent Flowsheet Documentation  Taken 3/4/2021 0330 by Brenna Ortega RN  Body Position:   turned   tilted, left  Taken 3/4/2021 0200 by Brenna Ortega RN  Body Position:   turned   supine  Taken 3/4/2021 0015 by Brenna Ortega RN  Body Position:   turned   tilted, left  Taken 3/3/2021 2230 by Brenna Ortega RN  Body Position: position maintained  Taken 3/3/2021 2020 by Brenna Ortega RN  Body Position: (arrived from OR) supine  Intervention: Prevent and Manage  VTE (venous thromboembolism) Risk  Recent Flowsheet Documentation  Taken 3/4/2021 0330 by Brenna Ortega RN  VTE Prevention/Management:   bilateral   sequential compression devices on  Taken 3/4/2021 0015 by Brenna Ortega RN  VTE Prevention/Management:   bilateral   sequential compression devices on  Taken 3/3/2021 2020 by Brenna Ortega RN  VTE Prevention/Management:   bilateral   sequential compression devices on  Intervention: Prevent Infection  Recent Flowsheet Documentation  Taken 3/4/2021 0330 by Brenna Ortega RN  Infection Prevention:   environmental surveillance performed   rest/sleep promoted   cohorting utilized   single patient room provided  Taken 3/4/2021 0300 by Brenna Ortega RN  Infection Prevention:   environmental surveillance performed   rest/sleep promoted  Taken 3/4/2021 0200 by Brenna Ortega RN  Infection Prevention:   environmental surveillance performed   rest/sleep promoted  Taken 3/4/2021 0015 by Brenna Ortega RN  Infection Prevention:   cohorting utilized   environmental surveillance performed   personal protective equipment utilized   rest/sleep promoted  Taken 3/3/2021 2330 by Brenna Ortega RN  Infection Prevention:   cohorting utilized   environmental surveillance performed   personal protective equipment utilized   rest/sleep promoted  Taken 3/3/2021 2230 by Brenna Ortega RN  Infection Prevention:   cohorting utilized   environmental surveillance performed   personal protective equipment utilized   rest/sleep promoted  Taken 3/3/2021 2132 by Brenna Ortega RN  Infection Prevention:   cohorting utilized   environmental surveillance performed   personal protective equipment utilized   rest/sleep promoted  Taken 3/3/2021 2020 by Brenna Ortega RN  Infection Prevention:   cohorting utilized   environmental surveillance performed   hand hygiene promoted   equipment surfaces disinfected   personal protective equipment utilized   rest/sleep promoted    single patient room provided   visitors restricted/screened  Goal: Optimal Comfort and Wellbeing  Outcome: Ongoing, Progressing  Goal: Readiness for Transition of Care  Outcome: Ongoing, Progressing  Intervention: Mutually Develop Transition Plan  Recent Flowsheet Documentation  Taken 3/3/2021 2318 by Brenna Oretga RN  Equipment Currently Used at Home:   bath bench   walker, rolling  Taken 3/3/2021 2313 by Brenna Ortega RN  Transportation Anticipated: family or friend will provide  Transportation Concerns: car, none  Patient/Family Anticipated Services at Transition: home health care  Patient/Family Anticipates Transition to: home with help/services     Problem: Fall Injury Risk  Goal: Absence of Fall and Fall-Related Injury  Outcome: Ongoing, Progressing  Intervention: Identify and Manage Contributors to Fall Injury Risk  Recent Flowsheet Documentation  Taken 3/4/2021 0330 by Brenna Ortega RN  Medication Review/Management: medications reviewed  Taken 3/4/2021 0200 by Brenna Ortega RN  Medication Review/Management: medications reviewed  Taken 3/4/2021 0015 by Brenna Ortega RN  Medication Review/Management: medications reviewed  Taken 3/3/2021 2230 by Brenna Ortega RN  Medication Review/Management: medications reviewed  Taken 3/3/2021 2020 by Brenna Ortega RN  Medication Review/Management: medications reviewed  Intervention: Promote Injury-Free Environment  Recent Flowsheet Documentation  Taken 3/4/2021 0330 by Brenna Ortega RN  Safety Promotion/Fall Prevention:   safety round/check completed   fall prevention program maintained  Taken 3/4/2021 0200 by Brenna Ortega RN  Safety Promotion/Fall Prevention:   safety round/check completed   fall prevention program maintained  Taken 3/4/2021 0015 by Brenna Ortega RN  Safety Promotion/Fall Prevention:   safety round/check completed   nonskid shoes/slippers when out of bed   fall prevention program maintained   assistive  device/personal items within reach   clutter free environment maintained  Taken 3/3/2021 2230 by Brenna Ortega RN  Safety Promotion/Fall Prevention:   fall prevention program maintained   safety round/check completed  Taken 3/3/2021 2020 by Brenna Ortega RN  Safety Promotion/Fall Prevention:   assistive device/personal items within reach   clutter free environment maintained   fall prevention program maintained   lighting adjusted   room organization consistent   safety round/check completed     Problem: Pain Chronic (Persistent)  Goal: Acceptable Pain Control and Functional Ability  Outcome: Ongoing, Progressing  Intervention: Develop Pain Management Plan  Recent Flowsheet Documentation  Taken 3/4/2021 0015 by Brenna Ortega RN  Pain Management Interventions:   pain pump in use   pillow support provided  Intervention: Manage Persistent Pain  Recent Flowsheet Documentation  Taken 3/4/2021 0330 by Brenna Ortega RN  Medication Review/Management: medications reviewed  Taken 3/4/2021 0200 by Brenna Ortega RN  Medication Review/Management: medications reviewed  Taken 3/4/2021 0015 by Brenna Ortega RN  Medication Review/Management: medications reviewed  Taken 3/3/2021 2230 by Brenna Ortega RN  Medication Review/Management: medications reviewed  Taken 3/3/2021 2020 by Brenna Ortega RN  Medication Review/Management: medications reviewed     Problem: Bleeding (Spinal Surgery)  Goal: Absence of Bleeding  Outcome: Ongoing, Progressing     Problem: Bowel Elimination Impaired (Spinal Surgery)  Goal: Effective Bowel Elimination  Outcome: Ongoing, Progressing     Problem: Functional Ability Impaired (Spinal Surgery)  Goal: Optimal Functional Ability  Outcome: Ongoing, Progressing  Intervention: Optimize Functional Status  Recent Flowsheet Documentation  Taken 3/4/2021 0330 by Brenna Ortega RN  Positioning/Transfer Devices:   pillows   in use  Taken 3/4/2021 0200 by Brenna Ortega  RN  Positioning/Transfer Devices:   pillows   in use  Taken 3/4/2021 0015 by Brenna Ortega RN  Positioning/Transfer Devices:   pillows   in use  Taken 3/3/2021 2020 by Brenna Ortega RN  Positioning/Transfer Devices:   pillows   in use     Problem: Infection (Spinal Surgery)  Goal: Absence of Infection Signs and Symptoms  Outcome: Ongoing, Progressing     Problem: Neurologic Impairment (Spinal Surgery)  Goal: Optimal Neurologic Function  Outcome: Ongoing, Progressing  Intervention: Optimize Neurologic Function  Recent Flowsheet Documentation  Taken 3/4/2021 0330 by Brenna Ortega RN  Body Position:   turned   tilted, left  Taken 3/4/2021 0200 by Brenna Ortega RN  Body Position:   turned   supine  Taken 3/4/2021 0015 by Brenna Ortega RN  Body Position:   turned   tilted, left  Pressure Reduction Devices: pressure-redistributing mattress utilized  Taken 3/3/2021 2230 by Brenna Ortega RN  Body Position: position maintained  Taken 3/3/2021 2020 by Brenna Ortega RN  Body Position: (arrived from OR) supine  Pressure Reduction Devices:   pressure-redistributing mattress utilized   positioning supports utilized  Range of Motion:   active ROM (range of motion) encouraged   ROM (range of motion) performed     Problem: Ongoing Anesthesia Effects (Spinal Surgery)  Goal: Anesthesia/Sedation Recovery  Outcome: Ongoing, Progressing  Intervention: Optimize Anesthesia Recovery  Recent Flowsheet Documentation  Taken 3/4/2021 0330 by Brenna Ortega RN  Safety Promotion/Fall Prevention:   safety round/check completed   fall prevention program maintained  Taken 3/4/2021 0200 by Brenna Ortega RN  Safety Promotion/Fall Prevention:   safety round/check completed   fall prevention program maintained  Taken 3/4/2021 0015 by Brenna Ortega RN  Safety Promotion/Fall Prevention:   safety round/check completed   nonskid shoes/slippers when out of bed   fall prevention program maintained   assistive  device/personal items within reach   clutter free environment maintained  Taken 3/3/2021 2230 by Brenna Ortega RN  Safety Promotion/Fall Prevention:   fall prevention program maintained   safety round/check completed  Taken 3/3/2021 2020 by Brenna Ortega RN  Safety Promotion/Fall Prevention:   assistive device/personal items within reach   clutter free environment maintained   fall prevention program maintained   lighting adjusted   room organization consistent   safety round/check completed     Problem: Pain (Spinal Surgery)  Goal: Acceptable Pain Control  Outcome: Ongoing, Progressing  Intervention: Prevent or Manage Pain  Recent Flowsheet Documentation  Taken 3/4/2021 0015 by Brenna Ortega RN  Pain Management Interventions:   pain pump in use   pillow support provided     Problem: Postoperative Nausea and Vomiting (Spinal Surgery)  Goal: Nausea and Vomiting Relief  Outcome: Ongoing, Progressing     Problem: Postoperative Urinary Retention (Spinal Surgery)  Goal: Effective Urinary Elimination  Outcome: Ongoing, Progressing     Problem: Skin Injury Risk Increased  Goal: Skin Health and Integrity  Outcome: Ongoing, Progressing  Intervention: Optimize Skin Protection  Recent Flowsheet Documentation  Taken 3/4/2021 0330 by Brenna Ortega RN  Head of Bed (HOB): HOB elevated  Taken 3/4/2021 0200 by Brenna Ortega RN  Head of Bed (HOB): HOB elevated  Taken 3/4/2021 0015 by Brenna Ortega RN  Pressure Reduction Techniques:   frequent weight shift encouraged   heels elevated off bed  Head of Bed (HOB): HOB at 15 degrees  Pressure Reduction Devices: pressure-redistributing mattress utilized  Skin Protection:   skin-to-device areas padded   skin-to-skin areas padded   incontinence pads utilized  Taken 3/3/2021 2020 by Brenna Ortega RN  Pressure Reduction Techniques:   frequent weight shift encouraged   heels elevated off bed   pressure points protected  Head of Bed (HOB):   HOB at 15 degrees   HOB  not elevated per patient request  Pressure Reduction Devices:   pressure-redistributing mattress utilized   positioning supports utilized  Skin Protection:   electrode sites changed   pulse oximeter probe site changed   Goal Outcome Evaluation:      Patient arrived from PACU at 2008.  Patient drowsy but awakens easily.  Family briefly at bedside to visit with patient.  Cognition improved by midnight, patient alert and oriented, answering all questions appropriately.  Cardene gtt titrated to 2.5mg.  R radial A-line trending BP to keep within MD limits.  PCA intact, ETCO2 monitor in place.  Horan catheter in place and draining w/o difficulty.  Wound vac intact and output amount recorded, no additional drainage noted throughout shift, dressing is intact.  Sm skin tear noted next to R hip wound vac site.  Patient reports pain improved throughout shift, using PCA on own.  Patient turned Q2 as tolerated.  CHG bath performed.  Patient afebrile.  Resting easily.  Fall precautions in place.

## 2021-03-04 NOTE — THERAPY RE-EVALUATION
Patient Name: Tawny Shin  : 1955    MRN: [id number]                              Today's Date: 3/4/2021       Admit Date: 2021    Visit Dx:     Therapist utilized gait belt, applied non-slipped socks, provided fall risk education/prevention, & facilitated muscle strengthening PRN to reduce patient falls risk during this session.      ICD-10-CM ICD-9-CM   1. Stenosis of cervical spine with myelopathy (CMS/[facility])  M48.02 723.0    G99.2 336.3   2. Gait abnormality  R26.9 781.2   3. Spinal stenosis, lumbar region, with neurogenic claudication  M48.062 724.03   4. Discitis of lumbar region  M46.46 722.93   5. Osteomyelitis of lumbar spine (CMS/[facility])  M46.26 730.28   6. Staphylococcus aureus bacteremia  R78.81 790.7    B95.61 041.11   7. Impaired mobility and ADLs  Z74.09 V49.89    Z78.9      Patient Active Problem List   Diagnosis   • Eustachian tube dysfunction   • Sensorineural hearing loss   • Lumbago of multiple sites in spine with sciatica   • Spondylolisthesis of lumbar region   • Coronary artery disease   • Hyperlipidemia   • Hypertension   • Myalgia due to statin   • S/P coronary artery stent placement   • Pacemaker   • Seropositive rheumatoid arthritis of multiple sites (CMS/[facility])   • Sick sinus syndrome (CMS/[facility])   • Other osteoporosis without current pathological fracture   • Allergic-infective asthma   • Arthritis of both knees   • Vasovagal syncope   • Bilateral bunions   • Bronchitis   • Cardiac pacemaker syndrome   • Charcot's joint of foot, left   • Constipation   • 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   • Intrinsic asthma   • Left carotid bruit   • Neutropenia (CMS/[facility])   • Obesity   • Primary osteoarthritis of left knee   • Psoriasis vulgaris   • Recurrent acute serous otitis media of both ears   • S/P lumbar laminectomy   • Thrombocytopenia (CMS/[facility])   • Hypothyroidism   • Vitamin D deficiency   • Myxedema heart disease   • Spondylolisthesis of lumbar region   • Syncope, cardiogenic   •  Rupture of gluteus minimus tendon   • Muscle tear   • Syncope, recurrent   • Leukocytosis   • Headache   • Elevated CPK   • Staphylococcus aureus bacteremia   • Right hip pain, suspected infection   • Obesity (BMI 30-39.9)   • Stenosis of cervical spine with myelopathy (CMS/HCC)   • Spinal stenosis, lumbar region, with neurogenic claudication   • Discitis of lumbar region   • Osteomyelitis of lumbar spine (CMS/HCC)     Past Medical History:   Diagnosis Date   • Arthritis    • Asthma    • Chronic sinusitis    • Coronary artery disease    • Eustachian tube dysfunction    • Heart disease    • Herpes simplex    • Hyperlipidemia    • Hypertension    • Knee dislocation    • Labral tear of right hip joint    • Laryngitis sicca    • Laryngitis, chronic    • MI (myocardial infarction) (CMS/HCC)    • Otorrhea    • Sensorineural hearing loss    • Sjogren's disease (CMS/HCC)      Past Surgical History:   Procedure Laterality Date   • A-V CARDIAC PACEMAKER INSERTION  2016   • ATRIAL CARDIAC PACEMAKER INSERTION     • CARDIAC CATHETERIZATION     • CATARACT EXTRACTION     • CERVICAL CORPECTOMY N/A 3/3/2021    Procedure: CERVICAL 6 CORPECTOMY WITH TITANIUM CAGE WITH NEURO MONITORING;  Surgeon: Bandar Shea MD;  Location:  [facility];  Service: Neurosurgery;  Laterality: N/A;   • COLONOSCOPY  11/08/2011    One fold in the ascending colon which showed ulcer otherwise normal exam   • COLONOSCOPY  11/12/2004    Normal exam repeat in five years   • CORONARY ANGIOPLASTY WITH STENT PLACEMENT      X 2; 2013 & 2014   • ENDOSCOPY  07/10/2014    Normal exam   • HIP ABDUCTION TENOTOMY BILATERAL Right 1/14/2021    Procedure: RIGHT HIP GLUTEUS MEDLUS / MINIMUS REPAIR, POSSIBLE ACHILLES ALLOGRAFT;  Surgeon: Nino Carlson MD;  Location:  [facility];  Service: Orthopedics;  Laterality: Right;   • INCISION AND DRAINAGE HIP Right 2/9/2021    Procedure: HIP INCISION AND DRAINAGE;  Surgeon: Nino Carlson MD;  Location:  [facility];  Service:  Orthopedics;  Laterality: Right;   • JOINT REPLACEMENT     • LUMBAR FUSION N/A 1/19/2018    Procedure: L3-4,L4-5 DECOMPRESSION, POSTERIOR SPINAL FUSION WITH INSTRUMENTATION;  Surgeon: Fortino Oropeza MD;  Location:  [facility];  Service:    • LUMBAR LAMINECTOMY WITH FUSION Left 1/17/2018    Procedure: LEFT L3-4 L4-5 LATERAL LUMBAR INTERBODY FUSION;  Surgeon: Fortino Oropeza MD;  Location:  [facility];  Service:    • MYRINGOTOMY W/ TUBES  09/04/2014    TUBES NO LONGER IN PLACE   • OTHER SURGICAL HISTORY      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 so hardware was removed and spacers were placed   • REPLACEMENT TOTAL KNEE Right      General Information     Row Name 03/04/21 0823          OT Time and Intention    Document Type  evaluation Pt s/p C6 corpectomy with titanium cage with neuro monitoring on 3/3. Dx: stenosis of cervical spine with myelopathy. per VIRGIL Chaves pt is WBAT RLE with posterior hip precautions. Per Dr. Shea, no TLSO, c-collar only.  -JJ     Mode of Treatment  occupational therapy  -JJ     Row Name 03/04/21 0823          General Information    Patient Profile Reviewed  yes  -JJ     Existing Precautions/Restrictions  fall;right;hip;weight bearing;spinal WBAT RLE, per Silverio HERMAN. C-collar AAT  -JJ     Barriers to Rehab  previous functional deficit;physical barrier  -JJ     Row Name 03/04/21 0823          Cognition    Orientation Status (Cognition)  oriented x 4  -JJ     Row Name 03/04/21 0823          Safety Issues, Functional Mobility    Impairments Affecting Function (Mobility)  endurance/activity tolerance;pain;balance  -JJ       User Key  (r) = Recorded By, (t) = Taken By, (c) = Cosigned By    Initials Name Provider Type    Trinity Haddad OTR/L Occupational Therapist          Mobility/ADL's     Row Name 03/04/21 0823          Bed Mobility    Bed Mobility  rolling left;rolling right;sidelying-sit  -JJ     Rolling Left [place] (Bed Mobility)  minimum assist (75% patient  effort);verbal cues;nonverbal cues (demo/gesture)  -     Rolling Right [place] (Bed Mobility)  minimum assist (75% patient effort);verbal cues;nonverbal cues (demo/gesture)  -     Sidelying-Sit [place] (Bed Mobility)  minimum assist (75% patient effort);2 person assist;verbal cues;nonverbal cues (demo/gesture)  -     Assistive Device (Bed Mobility)  bed rails  -     Row Name 03/04/21 0823          Transfers    Transfers  sit-stand transfer  -     Sit-Stand [place] (Transfers)  contact guard;verbal cues;nonverbal cues (demo/gesture)  -     Row Name 03/04/21 0823          Sit-Stand Transfer    Assistive Device (Sit-Stand Transfers)  walker, front-wheeled  -     Row Name 03/04/21 0823          Functional Mobility    Functional Mobility- Ind. Level  contact guard assist;verbal cues required  -     Functional Mobility- Device  rolling walker  -     Functional Mobility- Comment  bed, into hallway and back to chair.  -     Row Name 03/04/21 0823          Activities of Daily Living    BADL Assessment/Intervention  lower body dressing;toileting  -     Row Name 03/04/21 0823          Mobility    Extremity Weight-bearing Status  right lower extremity  -     Right Lower Extremity (Weight-bearing Status)  weight-bearing as tolerated (WBAT) per Silverio HERMAN.  -     Row Name 03/04/21 0823          Lower Body Dressing Assessment/Training    [place] Level (Lower Body Dressing)  don;socks;maximum assist (25% patient effort)  -     Position (Lower Body Dressing)  edge of bed sitting  -     Row Name 03/04/21 0823          Toileting Assessment/Training    [place] Level (Toileting)  perform perineal hygiene;change pad/brief;maximum assist (25% patient effort)  -JJ     Position (Toileting)  supine  -     Comment (Toileting)  pt with multiple incontinent BM episodes during session.  -       User Key  (r) = Recorded By, (t) = Taken By, (c) = Cosigned By    Initials Name Provider  Type    Trinity Haddad OTR/L Occupational Therapist        Obj/Interventions     [facility] Name 03/04/21 0823          Sensory Assessment (Somatosensory)    Sensory Assessment (Somatosensory)  UE sensation intact  -[facility] Name 03/04/21 0823          Range of Motion Comprehensive    General Range of Motion  bilateral upper extremity ROM WFL  -[facility] Name 03/04/21 0823          Strength Comprehensive (MMT)    Comment, General Manual Muscle Testing (MMT) Assessment  B UE strength 4+/5  -[facility] Name 03/04/21 0823          Balance    Balance Assessment  sitting static balance;standing static balance;standing dynamic balance  -     Static Sitting Balance  WFL;sitting, edge of bed  -     Static Standing Balance  mild impairment forwards flexed posture, required vcs to correct. Only able to hold full upright position approx 20-30 seconds.  -     Dynamic Standing Balance  mild impairment;supported;standing  -       User Key  (r) = Recorded By, (t) = Taken By, (c) = Cosigned By    Initials Name Provider Type    Trinity Haddad OTR/L Occupational Therapist        Goals/Plan     Row Name 03/04/21 0823          Transfer Goal 1 (OT)    Activity/Assistive Device (Transfer Goal 1, OT)  toilet;shower chair  -JJ     [place] Level/Cues Needed (Transfer Goal 1, OT)  standby assist  -JJ     Time Frame (Transfer Goal 1, OT)  long term goal (LTG);by discharge  -JJ     Progress/Outcome (Transfer Goal 1, OT)  goal ongoing  -JJ     Row Name 03/04/21 0823          Dressing Goal 1 (OT)    Activity/Device (Dressing Goal 1, OT)  lower body dressing  -JJ     [place]/Cues Needed (Dressing Goal 1, OT)  minimum assist (75% or more patient effort)  -JJ     Time Frame (Dressing Goal 1, OT)  long term goal (LTG);by discharge  -JJ     Progress/Outcome (Dressing Goal 1, OT)  goal ongoing  -JJ     Row Name 03/04/21 0823          Toileting Goal 1 (OT)    Activity/Device (Toileting Goal 1, OT)  toileting skills, all   -JJ     [place] Level/Cues Needed (Toileting Goal 1, OT)  minimum assist (75% or more patient effort)  -JJ     Time Frame (Toileting Goal 1, OT)  long term goal (LTG);by discharge  -JJ     Progress/Outcome (Toileting Goal 1, OT)  goal ongoing  -J     Row Name 03/04/21 0823          Therapy Assessment/Plan (OT)    Planned Therapy Interventions (OT)  activity tolerance training;BADL retraining;adaptive equipment training;functional balance retraining;occupation/activity based interventions;patient/caregiver education/training;strengthening exercise;transfer/mobility retraining  -J       User Key  (r) = Recorded By, (t) = Taken By, (c) = Cosigned By    Initials Name Provider Type    Trinity Haddad OTR/L Occupational Therapist        Clinical Impression     Row Name 03/04/21 0823          Pain Assessment    Additional Documentation  Pain Scale: Numbers Pre/Post-Treatment (Group)  -     Row Name 03/04/21 0823          Pain Scale: Numbers Pre/Post-Treatment    Pretreatment Pain Rating  3/10  -JJ     Posttreatment Pain Rating  3/10  -J     Pain Location  back  -J     Pain Intervention(s)  Medication (See MAR);Ambulation/increased activity;Repositioned  -     Row Name 03/04/21 0823          Plan of Care Review    Plan of Care Reviewed With  patient  -J     Outcome Summary  OT re-eval complete s/p cervical sx. Pt Silverio HERMAN, pt is WBAT RLE. Per Dr. Shea, c-collar only. Pt with multiple incontinent episode throughout session. Pt reports that she knows when she needs to go but is unable to control bowels. She was able to complete all bed mobility with Min Ax2. Max A for perineal hygiene and changing pad during toileting. CGA for sit <> stand t/f and functional mobility with rwx. Displays a forward flexed posture during mobility, was able to correct approx 30 seconds at a time with vcs. She reports decreased use of R UE for self-feeding tasks, unable to complete further testing at this time d/t art  line. Will continue to assess R UE strength and coordination. She would benefit from skilled OT services to address these deficits and assist with return to PLOF. Anticipate d/c back to [facility].  -     Row Name 03/04/21 0823          Therapy Assessment/Plan (OT)    Patient/Family Therapy Goal Statement (OT)  increase independence.  -J     Rehab Potential (OT)  good, to achieve stated therapy goals  -     Criteria for Skilled Therapeutic Interventions Met (OT)  yes;skilled treatment is necessary  -     Therapy Frequency (OT)  5 times/wk  -     Predicted Duration of Therapy Intervention (OT)  10 days  -J     Row Name 03/04/21 0823          Therapy Plan Review/Discharge Plan (OT)    Anticipated Discharge Disposition (OT)  [facility]  -     Row Name 03/04/21 0823          Vital Signs    Pre Patient Position  Supine  -J     Intra Patient Position  Standing  -JJ     Post Patient Position  Sitting  -     Row Name 03/04/21 0823          Positioning and Restraints    Pre-Treatment Position  in bed  -JJ     Post Treatment Position  chair  -J     In Chair  notified nsg;reclined;call light within reach;encouraged to call for assist;legs elevated;with brace  -       User Key  (r) = Recorded By, (t) = Taken By, (c) = Cosigned By    Initials Name Provider Type    Trinity Haddad OTR/L Occupational Therapist        Outcome Measures     Row Name 03/04/21 0823          How much help from another is currently needed...    Putting on and taking off regular lower body clothing?  2  -JJ     Bathing (including washing, rinsing, and drying)  2  -JJ     Toileting (which includes using toilet bed pan or urinal)  3  -JJ     Putting on and taking off regular upper body clothing  4  -JJ     Taking care of personal grooming (such as brushing teeth)  4  -JJ     Eating meals  4  -JJ     AM-PAC 6 Clicks Score (OT)  19  -JJ     Row Name 03/04/21 0823          Functional Assessment    Outcome Measure Options   AM-PAC 6 Clicks Daily Activity (OT)  -MARKIE       User Key  (r) = Recorded By, (t) = Taken By, (c) = Cosigned By    Initials Name Provider Type    Trinity Haddad OTR/L Occupational Therapist        Occupational Therapy Education                 Title: PT OT SLP Therapies (In Progress)     Topic: Occupational Therapy (In Progress)     Point: ADL training (Done)     Description:   Instruct learner(s) on proper safety adaptation and remediation techniques during self care or transfers.   Instruct in proper use of assistive devices.              Learning Progress Summary           Patient Acceptance, E, VU by QUANG at 3/4/2021 1128                   Point: Home exercise program (Not Started)     Description:   Instruct learner(s) on appropriate technique for monitoring, assisting and/or progressing therapeutic exercises/activities.              Learner Progress:  Not documented in this visit.          Point: Precautions (Done)     Description:   Instruct learner(s) on prescribed precautions during self-care and functional transfers.              Learning Progress Summary           Patient Acceptance, E, VU by QUANG at 3/4/2021 1128                   Point: Body mechanics (Done)     Description:   Instruct learner(s) on proper positioning and spine alignment during self-care, functional mobility activities and/or exercises.              Learning Progress Summary           Patient Acceptance, E, VU by QUANG at 3/4/2021 1128                               User Key     Initials Effective Dates Name Provider Type Discipline    QUANG 12/04/20 -  Trinity Day OTR/L Occupational Therapist OT              OT Recommendation and Plan  Planned Therapy Interventions (OT): activity tolerance training, BADL retraining, adaptive equipment training, functional balance retraining, occupation/activity based interventions, patient/caregiver education/training, strengthening exercise, transfer/mobility retraining  Therapy Frequency (OT): 5  times/wk  Plan of Care Review  Plan of Care Reviewed With: patient  Outcome Summary: OT re-eval complete s/p cervical sx. Pt Silverio HERMAN, pt is WBAT RLE. Per Dr. Shea, c-collar only. Pt with multiple incontinent episode throughout session. Pt reports that she knows when she needs to go but is unable to control bowels. She was able to complete all bed mobility with Min Ax2. Max A for perineal hygiene and changing pad during toileting. CGA for sit <> stand t/f and functional mobility with rwx. Displays a forward flexed posture during mobility, was able to correct approx 30 seconds at a time with vcs. She reports decreased use of R UE for self-feeding tasks, unable to complete further testing at this time d/t art line. Will continue to assess R UE strength and coordination. She would benefit from skilled OT services to address these deficits and assist with return to PLOF. Anticipate d/c back to [facility].     Time Calculation:   Time Calculation- OT     Row Name 03/04/21 1129             Time Calculation- OT    OT Start Time  0910 add 10 minutes for chart review and discussion with MD MCKEON      OT Stop Time  1000  -QUANG      OT Time Calculation (min)  50 min  -QUANG      Total Timed Code Minutes- OT  60 minute(s)  -QUANG      OT Received On  03/04/21  -QUANG      OT Goal Re-Cert Due Date  03/14/21  -QUANG        User Key  (r) = Recorded By, (t) = Taken By, (c) = Cosigned By    Initials Name Provider Type    Trinity Haddad OTR/L Occupational Therapist        Therapy Charges for Today     Code Description Service Date Service Provider Modifiers Qty    [id number]  OT RE-EVAL 2 3/4/2021 Trinity Day OTR/L GO 1    [id number]  OT SELF CARE/MGMT/TRAIN EA 15 MIN 3/4/2021 Trinity Day OTR/L GO 2               KATHYA Sr/KARUNA  3/4/2021

## 2021-03-04 NOTE — PROGRESS NOTES
Infectious Diseases Progress Note    Patient:  Tawny Shin  YOB: 1955  MRN: [id number]   Admit date: 2/12/2021   Admitting Physician: Beni Urrutia MD  Primary Care Physician: Davon Urrutia MD    Chief Complaint/Interval History: Saw her and [facility]U prior to her transfer [facility] on March 4, 2021.  She seems to be doing remarkably well.  She had walked on the unit with assistance earlier in the day.  I was able to watch her sit at the bedside, stand, pivot, and move toward sitting in a wheelchair with only minimal assistance and primarily just guarding protection to make sure she did not lose balance.  She feels better.  She seemed to be able to weight-bear reasonably well in the hip without much discomfort.  She seems more comfortable in the low back that she did preoperatively as well.  Son was at bedside.  Reviewed culture/aspirate results with them.    Intake/Output Summary (Last 24 hours) at 3/4/2021 1557  Last data filed at 3/4/2021 1439  Gross per 24 hour   Intake 3768.7 ml   Output 2070 ml   Net 1698.7 ml     Allergies:   Allergies   Allergen Reactions   • Atorvastatin Other (See Comments)     LEG CRAMPS     • Amoxicillin Rash   • Escitalopram Rash   • Nabumetone Rash   • Niacin Er Rash   • Penicillins Rash   • Simvastatin Rash     Current Scheduled Medications:   ascorbic acid, 500 mg, Oral, BID  carvedilol, 25 mg, Oral, BID With Meals  ceFAZolin, 2 g, Intravenous, Q8H  docusate sodium, 100 mg, Oral, BID  folic acid, 1 mg, Oral, Daily  gabapentin, 100 mg, Oral, Q12H  guaiFENesin, 600 mg, Oral, Q12H  heparin (porcine), 5,000 Units, Subcutaneous, Q12H  isosorbide mononitrate, 60 mg, Oral, BID - Nitrates  levothyroxine, 75 mcg, Oral, Q AM  multivitamin with minerals, 1 tablet, Oral, Daily  pantoprazole, 40 mg, Oral, Q AM  polyethylene glycol, 17 g, Oral, Daily  predniSONE, 5 mg, Oral, Daily With Breakfast  saccharomyces boulardii, 250 mg, Oral, BID  senna-docusate  "sodium, 2 tablet, Oral, BID  sodium chloride, 10 mL, Intravenous, Q12H  valACYclovir, 500 mg, Oral, Q24H  vancomycin, 1,250 mg, Intravenous, Q12H  zinc sulfate, 220 mg, Oral, Daily      Current PRN Medications:  •  acetaminophen **OR** acetaminophen  •  bisacodyl  •  budesonide  •  cloNIDine  •  cyclobenzaprine  •  diazePAM  •  labetalol  •  magnesium hydroxide  •  naloxone  •  nitroglycerin  •  ondansetron **OR** ondansetron  •  oxyCODONE-acetaminophen  •  oxyCODONE-acetaminophen  •  sodium chloride  •  sodium chloride    Review of Systems no nausea, diarrhea, or rash.    Vital Signs:  /62   Pulse 76   Temp 97.6 °F (36.4 °C) (Oral)   Resp 21   Ht 165.1 cm (65\")   Wt 98.2 kg (216 lb 7.9 oz)   SpO2 97%   Breastfeeding No   BMI 36.03 kg/m²     Physical Exam  Vital signs - reviewed.  Line/IV site - No erythema, warmth, induration, or tenderness.  She seemed to have good strength in both lower extremities as I have watched her maneuver.  Alert, pleasant, smiling, interactive, and breathing comfortably.  Wound VAC in place right hip.  No surrounding erythema.    Lab Results:  CBC:   Results from last 7 days   Lab Units 03/04/21  0315 03/03/21  0454 03/02/21  0331 03/01/21  1250 03/01/21  0459 02/28/21  0430 02/27/21  0437 02/26/21  0346   WBC 10*3/mm3 3.46 5.29 6.10  --  6.52 5.38 6.36 6.39   HEMOGLOBIN g/dL 9.5* 7.7* 7.8*  --  8.9* 8.7* 7.8* 8.8*   HEMATOCRIT % 29.1* 23.9* 24.8* 27.6* 27.6* 26.4* 24.5* 27.7*   PLATELETS 10*3/mm3 221 254 223 251 251 286 272 299     BMP:  Results from last 7 days   Lab Units 03/04/21  0315 02/26/21  0346   SODIUM mmol/L 137 136   POTASSIUM mmol/L 4.4 4.1   CHLORIDE mmol/L 103 100   CO2 mmol/L 26.0 29.0   BUN mg/dL 11 9   CREATININE mg/dL 0.35* 0.46*   GLUCOSE mg/dL 145* 94   CALCIUM mg/dL 8.9 8.9   ALT (SGPT) U/L  --  <5     Culture Results:   (These wound cultures are surface cultures from the open right hip wound which currently has a wound VAC in place)  Wound Culture "   Date Value Ref Range Status   03/01/2021 Scant growth (1+) Staphylococcus aureus (A)  Preliminary   03/01/2021 Scant growth (1+) Mixed Gram Negative Selina (A)  Preliminary   03/01/2021 Scant growth (1+) Enterococcus species (A)  Preliminary   03/01/2021 Scant growth (1+) Normal Skin Selina  Preliminary     Body fluid cultures on March 2, 2021 which was CT-guided aspirate from the right psoas abscess:  Body Fluid Culture   Lab   Light growth (2+) Staphylococcus aureusAbnormal    [facility]             Gram Stain   Lab   Many (4+) WBCs seen  [facility]      Rare (1+) Gram positive cocci  [facility]           Radiology: None  Additional Studies Reviewed: None    Impression:   1.  Methicillin susceptible staph aureus bacteremia  2.  Postoperative infection evolving right hip following gluteus repair  3.  Right psoas abscess  4.  Discitis/osteomyelitis L1/L2  5.  Cervical stenosis-has had very favorable response to surgery yesterday  6.  Rheumatoid arthritis    Recommendations:   Continue supportive care  Continue physical therapy  Await susceptibility testing on staph aureus from right psoas abscess although I suspect will be MSSA  Continue cefazolin  Continue to follow    Elie Ohara MD

## 2021-03-04 NOTE — THERAPY RE-EVALUATION
Patient Name: Tawny Shin  : 1955    MRN: [id number]                              Today's Date: 3/4/2021       Admit Date: 2021    Visit Dx:     ICD-10-CM ICD-9-CM   1. Stenosis of cervical spine with myelopathy (CMS/[facility])  M48.02 723.0    G99.2 336.3   2. Gait abnormality  R26.9 781.2   3. Spinal stenosis, lumbar region, with neurogenic claudication  M48.062 724.03   4. Discitis of lumbar region  M46.46 722.93   5. Osteomyelitis of lumbar spine (CMS/[facility])  M46.26 730.28   6. Staphylococcus aureus bacteremia  R78.81 790.7    B95.61 041.11     Patient Active Problem List   Diagnosis   • Eustachian tube dysfunction   • Sensorineural hearing loss   • Lumbago of multiple sites in spine with sciatica   • Spondylolisthesis of lumbar region   • Coronary artery disease   • Hyperlipidemia   • Hypertension   • Myalgia due to statin   • S/P coronary artery stent placement   • Pacemaker   • Seropositive rheumatoid arthritis of multiple sites (CMS/[facility])   • Sick sinus syndrome (CMS/[facility])   • Other osteoporosis without current pathological fracture   • Allergic-infective asthma   • Arthritis of both knees   • Vasovagal syncope   • Bilateral bunions   • Bronchitis   • Cardiac pacemaker syndrome   • Charcot's joint of foot, left   • Constipation   • 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   • Intrinsic asthma   • Left carotid bruit   • Neutropenia (CMS/[facility])   • Obesity   • Primary osteoarthritis of left knee   • Psoriasis vulgaris   • Recurrent acute serous otitis media of both ears   • S/P lumbar laminectomy   • Thrombocytopenia (CMS/[facility])   • Hypothyroidism   • Vitamin D deficiency   • Myxedema heart disease   • Spondylolisthesis of lumbar region   • Syncope, cardiogenic   • Rupture of gluteus minimus tendon   • Muscle tear   • Syncope, recurrent   • Leukocytosis   • Headache   • Elevated CPK   • Staphylococcus aureus bacteremia   • Right hip pain, suspected infection   • Obesity (BMI 30-39.9)   • Stenosis of cervical spine  with myelopathy (CMS/H[facility])   • Spinal stenosis, lumbar region, with neurogenic claudication   • Discitis of lumbar region   • Osteomyelitis of lumbar spine (CMS/HCC)     Past Medical History:   Diagnosis Date   • Arthritis    • Asthma    • Chronic sinusitis    • Coronary artery disease    • Eustachian tube dysfunction    • Heart disease    • Herpes simplex    • Hyperlipidemia    • Hypertension    • Knee dislocation    • Labral tear of right hip joint    • Laryngitis sicca    • Laryngitis, chronic    • MI (myocardial infarction) (CMS/HCC)    • Otorrhea    • Sensorineural hearing loss    • Sjogren's disease (CMS/HCC)      Past Surgical History:   Procedure Laterality Date   • A-V CARDIAC PACEMAKER INSERTION  2016   • ATRIAL CARDIAC PACEMAKER INSERTION     • CARDIAC CATHETERIZATION     • CATARACT EXTRACTION     • CERVICAL CORPECTOMY N/A 3/3/2021    Procedure: CERVICAL 6 CORPECTOMY WITH TITANIUM CAGE WITH NEURO MONITORING;  Surgeon: Bandar Shea MD;  Location:  [facility] OR;  Service: Neurosurgery;  Laterality: N/A;   • COLONOSCOPY  11/08/2011    One fold in the ascending colon which showed ulcer otherwise normal exam   • COLONOSCOPY  11/12/2004    Normal exam repeat in five years   • CORONARY ANGIOPLASTY WITH STENT PLACEMENT      X 2; 2013 & 2014   • ENDOSCOPY  07/10/2014    Normal exam   • HIP ABDUCTION TENOTOMY BILATERAL Right 1/14/2021    Procedure: RIGHT HIP GLUTEUS MEDLUS / MINIMUS REPAIR, POSSIBLE ACHILLES ALLOGRAFT;  Surgeon: Nino Carlson MD;  Location:  [facility] OR;  Service: Orthopedics;  Laterality: Right;   • INCISION AND DRAINAGE HIP Right 2/9/2021    Procedure: HIP INCISION AND DRAINAGE;  Surgeon: Nino Carlson MD;  Location:  [facility] OR;  Service: Orthopedics;  Laterality: Right;   • JOINT REPLACEMENT     • LUMBAR FUSION N/A 1/19/2018    Procedure: L3-4,L4-5 DECOMPRESSION, POSTERIOR SPINAL FUSION WITH INSTRUMENTATION;  Surgeon: Fortino Oropeza MD;  Location:  [facility] OR;  Service:    • LUMBAR  LAMINECTOMY WITH FUSION Left 1/17/2018    Procedure: LEFT L3-4 L4-5 LATERAL LUMBAR INTERBODY FUSION;  Surgeon: Fortino Oropeza MD;  Location: [facility] OR;  Service:    • MYRINGOTOMY W/ TUBES  09/04/2014    TUBES NO LONGER IN PLACE   • OTHER SURGICAL HISTORY      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 so hardware was removed and spacers were placed   • REPLACEMENT TOTAL KNEE Right      General Information     Row Name 03/04/21 0900          Physical Therapy Time and Intention    Document Type  re-evaluation s/p C6 corpectomy with titanium cage  -MS     Mode of Treatment  physical therapy;co-treatment  -MS     Row Name 03/04/21 0900          General Information    Patient Profile Reviewed  yes  -MS     Prior Level of Function  independent:;all household mobility;community mobility;ADL's I before fall with I and D of R hip wound. Limited to stand piviot for 4 weeks due to TTWB R LE.  -MS     Existing Precautions/Restrictions  fall;spinal cervical collar at all times, WBAT R LE per Silverio HERMAN for Dr. Carlson.  -MS     Barriers to Rehab  previous functional deficit;physical barrier  -MS     Row Name 03/04/21 0900          Living Environment    Lives With  alone  -MS     Row Name 03/04/21 0900          Home Main Entrance    Number of Stairs, Main Entrance  one  -MS     Stair Railings, Main Entrance  none  -MS     Row Name 03/04/21 0900          Stairs Within Home, Primary    Number of Stairs, Within Home, Primary  none  -MS     Row Name 03/04/21 0900          Cognition    Orientation Status (Cognition)  oriented x 4  -MS     Row Name 03/04/21 0900          Safety Issues, Functional Mobility    Impairments Affecting Function (Mobility)  balance;endurance/activity tolerance  -MS       User Key  (r) = Recorded By, (t) = Taken By, (c) = Cosigned By    Initials Name Provider Type    MS Nicole Olson R, PT, DPT, NCS Physical Therapist        Mobility     Row Name 03/04/21 0900          Bed Mobility    Bed Mobility  rolling  left;rolling right;sidelying-sit  -MS     Rolling Left Foard (Bed Mobility)  minimum assist (75% patient effort);verbal cues;nonverbal cues (demo/gesture)  -MS     Rolling Right Foard (Bed Mobility)  minimum assist (75% patient effort);verbal cues;nonverbal cues (demo/gesture)  -MS     Sidelying-Sit Foard (Bed Mobility)  minimum assist (75% patient effort);2 person assist;verbal cues;nonverbal cues (demo/gesture)  -MS     Assistive Device (Bed Mobility)  bed rails  -MS     Comment (Bed Mobility)  assist to prevent R LE to adduct during transfer  -MS     Row Name 03/04/21 0900          Sit-Stand Transfer    Sit-Stand Foard (Transfers)  contact guard;verbal cues;nonverbal cues (demo/gesture)  -MS     Assistive Device (Sit-Stand Transfers)  walker, front-wheeled  -MS     Row Name 03/04/21 0900          Gait/Stairs (Locomotion)    Foard Level (Gait)  contact guard;verbal cues  -MS     Assistive Device (Gait)  walker, front-wheeled  -MS     Distance in Feet (Gait)  50ft with significant trendelenberg on R as expected from previous injuries/surgeries. R LE external rotation at all times during gait.  -MS     Deviations/Abnormal Patterns (Gait)  antalgic;base of support, wide;rush decreased;steppage  -MS     Right Sided Gait Deviations  Trendelenburg sign  -MS     Row Name 03/04/21 0900          Mobility    Extremity Weight-bearing Status  right lower extremity  -MS     Right Lower Extremity (Weight-bearing Status)  weight-bearing as tolerated (WBAT) Per Silverio HERMAN for Dr. Carlson  -MS       User Key  (r) = Recorded By, (t) = Taken By, (c) = Cosigned By    Initials Name Provider Type    MS Wesley Nicole R, PT, DPT, NCS Physical Therapist        Obj/Interventions     Row Name 03/04/21 0900          Range of Motion Comprehensive    Comment, General Range of Motion  AROM: R hip flexion impaired 60%, PROM/AROM: R knee flexion impaired 75% chronically. Limited R hip abd/adduction to  protect glut min and med repair.  -MS     Row Name 03/04/21 0900          Strength Comprehensive (MMT)    Comment, General Manual Muscle Testing (MMT) Assessment  R hip flexion 2+/5, R knee ext 5/5, R knee flex 4/5 within available range, R dorsiflexion 3+/5, R EHL 3/5, R plantarflexion 3/5. L hip flexion 4/5, L knee ext/flex 5/5, L ankle dorsiflexion 4/5, plantarflexion 4/5, L EHL 4/5  -MS     Row Name 03/04/21 0900          Motor Skills    Motor Skills  coordination  -MS     Coordination  -- difficult to test R UE due to art line, R LE limited by previous surgery complications, L LE WFL, L UE WFL. PT reports difficulty with using R UE to eat  -MS     Row Name 03/04/21 0900          Balance    Balance Assessment  sitting static balance;standing static balance;standing dynamic balance  -MS     Static Sitting Balance  WNL;sitting, edge of bed  -MS     Static Standing Balance  mild impairment pt able to stand unnassisted for 20 sec but slowly sinks into a forward flexed gait  -MS     Dynamic Standing Balance  mild impairment;supported  -MS     Row Name 03/04/21 0900          Sensory Assessment (Somatosensory)    Sensory Assessment (Somatosensory)  LE sensation intact  -MS       User Key  (r) = Recorded By, (t) = Taken By, (c) = Cosigned By    Initials Name Provider Type    MS Wesley Nicole R, PT, DPT, NCS Physical Therapist        Goals/Plan     Row Name 03/04/21 0900          Bed Mobility Goal 1 (PT)    Activity/Assistive Device (Bed Mobility Goal 1, PT)  sit to supine;supine to sit  -MS     [place] Level/Cues Needed (Bed Mobility Goal 1, PT)  independent  -MS     Time Frame (Bed Mobility Goal 1, PT)  long term goal (LTG);by discharge  -MS     Progress/Outcomes (Bed Mobility Goal 1, PT)  goal ongoing  -MS     Row Name 03/04/21 0900          Transfer Goal 1 (PT)    Activity/Assistive Device (Transfer Goal 1, PT)  sit-to-stand/stand-to-sit;bed-to-chair/chair-to-bed;walker, rolling  -MS     [place] Level/Cues  Needed (Transfer Goal 1, PT)  modified independence  -MS     Time Frame (Transfer Goal 1, PT)  long term goal (LTG);by discharge  -MS     Progress/Outcome (Transfer Goal 1, PT)  goal ongoing  -MS     Row Name 03/04/21 0900          Gait Training Goal 1 (PT)    Activity/Assistive Device (Gait Training Goal 1, PT)  gait (walking locomotion);assistive device use;decrease fall risk;improve balance and speed;increase endurance/gait distance;walker, rolling  -MS     [place] Level (Gait Training Goal 1, PT)  contact guard assist;tactile cues required;verbal cues required  -MS     Distance (Gait Training Goal 1, PT)  75ft maintaining upright posture and use of RW only for balance stability, not for weight bearing  -MS     Time Frame (Gait Training Goal 1, PT)  long term goal (LTG);by discharge  -MS     Progress/Outcome (Gait Training Goal 1, PT)  goal ongoing  -MS       User Key  (r) = Recorded By, (t) = Taken By, (c) = Cosigned By    Initials Name Provider Type    Nicole Palacios, PT, DPT, NCS Physical Therapist        Clinical Impression     Row Name 03/04/21 0900          Pain    Additional Documentation  Pain Scale: Numbers Pre/Post-Treatment (Group)  -MS     Row Name 03/04/21 0900          Pain Scale: Numbers Pre/Post-Treatment    Pretreatment Pain Rating  3/10  -MS     Posttreatment Pain Rating  3/10  -MS     Pain Location - Orientation  lower  -MS     Pain Location  back  -MS     Pain Intervention(s)  Medication (See MAR);Repositioned;Ambulation/increased activity  -MS     Row Name 03/04/21 0900          Plan of Care Review    Plan of Care Reviewed With  patient  -MS     Progress  improving  -MS     Outcome Summary  PT re-evaluation completed s/p cervical surgery. The patient has been cleared by ortho (per Silverio HERMAN for Dr. Carlson) to be WBAT on the R LE since she is 8 weeks out from her initial surgery. The patient has a wound vac intact on her R hip I and D wound that PT will address tomorrow with plans  to switch to an instillation wound vac to help with healing. At this time it was difficult to test coordination on the R UE due to her art line, but the patient does report difficulty with using her R hand for feeding. Further testing will be done at a later date. Testing for the R LE is difficult as well due to her prior complications from multiple orthropedic surgeries. She does have significant ROM limitations of the R knee and hip and weakness that is to be expected. Her L LE demonstrates full ROM with minimal weakness that is focused in her ankle and EHL strength. Her sensation is intact throughout and her pain is only a 3/10 in her back at this time. She was able to walk about 50ft with a significant trendelenberg sign on the R as expected. She will benefit from continued PT to work on strengthening and activity tolerance. We will continue to monitor for any neurological changes or changes in her R hip since she is now WBAT.  -MS     Row Name 03/04/21 0900          Therapy Assessment/Plan (PT)    Patient/Family Therapy Goals Statement (PT)  walk more  -MS     Rehab Potential (PT)  good, to achieve stated therapy goals  -MS     Criteria for Skilled Interventions Met (PT)  yes;meets criteria;skilled treatment is necessary  -MS     Predicted Duration of Therapy Intervention (PT)  until discharge  -MS     Row Name 03/04/21 0900          Vital Signs    O2 Delivery Pre Treatment  supplemental O2  -MS     O2 Delivery Intra Treatment  room air  -MS     Post SpO2 (%)  98  -MS     O2 Delivery Post Treatment  room air  -MS     Row Name 03/04/21 0900          Positioning and Restraints    Post Treatment Position  chair  -MS     In Chair  reclined;notified nsg;call light within reach;encouraged to call for assist;with brace  -MS       User Key  (r) = Recorded By, (t) = Taken By, (c) = Cosigned By    Initials Name Provider Type    MS Nicole Olson R, PT, DPT, NCS Physical Therapist        Outcome Measures     Row Name  03/04/21 0900          How much help from another person do you currently need...    Turning from your back to your side while in flat bed without using bedrails?  3  -MS     Moving from lying on back to sitting on the side of a flat bed without bedrails?  3  -MS     Moving to and from a bed to a chair (including a wheelchair)?  3  -MS     Standing up from a chair using your arms (e.g., wheelchair, bedside chair)?  3  -MS     Climbing 3-5 steps with a railing?  1  -MS     To walk in hospital room?  3  -MS     AM-PAC 6 Clicks Score (PT)  16  -MS     Row Name 03/04/21 0900          Functional Assessment    Outcome Measure Options  AM-PAC 6 Clicks Basic Mobility (PT)  -MS       User Key  (r) = Recorded By, (t) = Taken By, (c) = Cosigned By    Initials Name Provider Type    MS Nicole Olson, PT, DPT, NCS Physical Therapist        Physical Therapy Education                 Title: PT OT SLP Therapies (In Progress)     Topic: Physical Therapy (In Progress)     Point: Mobility training (Done)     Learning Progress Summary           Patient Acceptance, E, VU by MS at 3/4/2021 1110    Comment: role of PT in her care, spinal restrictions, new WBAT on R LE                   Point: Home exercise program (Not Started)     Learner Progress:  Not documented in this visit.          Point: Body mechanics (Not Started)     Learner Progress:  Not documented in this visit.          Point: Precautions (Done)     Learning Progress Summary           Patient Acceptance, E, VU by MS at 3/4/2021 1110    Comment: role of PT in her care, spinal restrictions, new WBAT on R LE                               User Key     Initials Effective Dates Name Provider Type Discipline    MS 06/19/18 -  Nicole Olson, PT, DPT, NCS Physical Therapist PT              PT Recommendation and Plan  Planned Therapy Interventions (PT): balance training, bed mobility training, gait training, patient/family education, orthotic fitting/training, strengthening,  transfer training  Plan of Care Reviewed With: patient  Progress: improving  Outcome Summary: PT re-evaluation completed s/p cervical surgery. The patient has been cleared by ortho (per Silverio HERMAN for Dr. Carlson) to be WBAT on the R LE since she is 8 weeks out from her initial surgery. The patient has a wound vac intact on her R hip I and D wound that PT will address tomorrow with plans to switch to an instillation wound vac to help with healing. At this time it was difficult to test coordination on the R UE due to her art line, but the patient does report difficulty with using her R hand for feeding. Further testing will be done at a later date. Testing for the R LE is difficult as well due to her prior complications from multiple orthropedic surgeries. She does have significant ROM limitations of the R knee and hip and weakness that is to be expected. Her L LE demonstrates full ROM with minimal weakness that is focused in her ankle and EHL strength. Her sensation is intact throughout and her pain is only a 3/10 in her back at this time. She was able to walk about 50ft with a significant trendelenberg sign on the R as expected. She will benefit from continued PT to work on strengthening and activity tolerance. We will continue to monitor for any neurological changes or changes in her R hip since she is now WBAT.     Time Calculation:   PT Charges     Row Name 03/04/21 0900             Time Calculation    Start Time  0900  -MS      Stop Time  1005  -MS      Time Calculation (min)  65 min  -MS      PT Received On  03/04/21  -MS      PT Goal Re-Cert Due Date  03/18/21  -MS         Time Calculation- PT    Total Timed Code Minutes- PT  30 minute(s)  -MS         Timed Charges    03996 - PT Therapeutic Activity Minutes  30  -MS        User Key  (r) = Recorded By, (t) = Taken By, (c) = Cosigned By    Initials Name Provider Type    Nicole Palacios R, PT, DPT, NCS Physical Therapist        Therapy Charges for Today      Code Description Service Date Service Provider Modifiers Qty    13936023801  PT THERAPEUTIC ACT EA 15 MIN 3/4/2021 Nicole Olson, PT, DPT, NCS GP 2    [id number] HC PT RE-EVAL ESTABLISHED PLAN 2 3/4/2021 Nicole Olson PT, DPT, NCS GP 1          PT G-Codes  Outcome Measure Options: AM-PAC 6 Clicks Basic Mobility (PT)  AM-PAC 6 Clicks Score (PT): 16    Nicole Olson, PT, DPT, NCS  3/4/2021

## 2021-03-05 LAB
ANION GAP SERPL CALCULATED.3IONS-SCNC: 7 MMOL/L (ref 5–15)
BACTERIA SPEC AEROBE CULT: ABNORMAL
BASOPHILS # BLD AUTO: 0.04 10*3/MM3 (ref 0–0.2)
BASOPHILS NFR BLD AUTO: 0.7 % (ref 0–1.5)
BUN SERPL-MCNC: 19 MG/DL (ref 8–23)
BUN/CREAT SERPL: 37.3 (ref 7–25)
CALCIUM SPEC-SCNC: 9 MG/DL (ref 8.6–10.5)
CHLORIDE SERPL-SCNC: 106 MMOL/L (ref 98–107)
CO2 SERPL-SCNC: 27 MMOL/L (ref 22–29)
CREAT SERPL-MCNC: 0.51 MG/DL (ref 0.57–1)
CRP SERPL-MCNC: 6.03 MG/DL (ref 0–0.5)
DEPRECATED RDW RBC AUTO: 49.1 FL (ref 37–54)
EOSINOPHIL # BLD AUTO: 0.01 10*3/MM3 (ref 0–0.4)
EOSINOPHIL NFR BLD AUTO: 0.2 % (ref 0.3–6.2)
ERYTHROCYTE [DISTWIDTH] IN BLOOD BY AUTOMATED COUNT: 15.4 % (ref 12.3–15.4)
ERYTHROCYTE [SEDIMENTATION RATE] IN BLOOD: 39 MM/HR (ref 0–20)
GFR SERPL CREATININE-BSD FRML MDRD: 120 ML/MIN/1.73
GLUCOSE SERPL-MCNC: 101 MG/DL (ref 65–99)
GRAM STN SPEC: ABNORMAL
GRAM STN SPEC: ABNORMAL
HCT VFR BLD AUTO: 30.6 % (ref 34–46.6)
HGB BLD-MCNC: 9.9 G/DL (ref 12–15.9)
IMM GRANULOCYTES # BLD AUTO: 0.08 10*3/MM3 (ref 0–0.05)
IMM GRANULOCYTES NFR BLD AUTO: 1.3 % (ref 0–0.5)
LYMPHOCYTES # BLD AUTO: 1.32 10*3/MM3 (ref 0.7–3.1)
LYMPHOCYTES NFR BLD AUTO: 22 % (ref 19.6–45.3)
MCH RBC QN AUTO: 28.4 PG (ref 26.6–33)
MCHC RBC AUTO-ENTMCNC: 32.4 G/DL (ref 31.5–35.7)
MCV RBC AUTO: 87.9 FL (ref 79–97)
MONOCYTES # BLD AUTO: 0.72 10*3/MM3 (ref 0.1–0.9)
MONOCYTES NFR BLD AUTO: 12 % (ref 5–12)
NEUTROPHILS NFR BLD AUTO: 3.82 10*3/MM3 (ref 1.7–7)
NEUTROPHILS NFR BLD AUTO: 63.8 % (ref 42.7–76)
NRBC BLD AUTO-RTO: 0 /100 WBC (ref 0–0.2)
PLATELET # BLD AUTO: 243 10*3/MM3 (ref 140–450)
PMV BLD AUTO: 9.3 FL (ref 6–12)
POTASSIUM SERPL-SCNC: 4.4 MMOL/L (ref 3.5–5.2)
RBC # BLD AUTO: 3.48 10*6/MM3 (ref 3.77–5.28)
SODIUM SERPL-SCNC: 140 MMOL/L (ref 136–145)
VANCOMYCIN TROUGH SERPL-MCNC: 13 MCG/ML (ref 5–20)
WBC # BLD AUTO: 5.99 10*3/MM3 (ref 3.4–10.8)

## 2021-03-05 PROCEDURE — 80048 BASIC METABOLIC PNL TOTAL CA: CPT | Performed by: NURSE PRACTITIONER

## 2021-03-05 PROCEDURE — 25010000002 HEPARIN (PORCINE) PER 1000 UNITS: Performed by: NURSE PRACTITIONER

## 2021-03-05 PROCEDURE — 97535 SELF CARE MNGMENT TRAINING: CPT

## 2021-03-05 PROCEDURE — 99024 POSTOP FOLLOW-UP VISIT: CPT | Performed by: NURSE PRACTITIONER

## 2021-03-05 PROCEDURE — 97164 PT RE-EVAL EST PLAN CARE: CPT | Performed by: PHYSICAL THERAPIST

## 2021-03-05 PROCEDURE — 25010000002 VANCOMYCIN 10 G RECONSTITUTED SOLUTION: Performed by: NURSE PRACTITIONER

## 2021-03-05 PROCEDURE — 25010000002 HYDROMORPHONE 1 MG/ML SOLUTION: Performed by: NEUROLOGICAL SURGERY

## 2021-03-05 PROCEDURE — 86140 C-REACTIVE PROTEIN: CPT | Performed by: NURSE PRACTITIONER

## 2021-03-05 PROCEDURE — 85651 RBC SED RATE NONAUTOMATED: CPT | Performed by: NURSE PRACTITIONER

## 2021-03-05 PROCEDURE — 92610 EVALUATE SWALLOWING FUNCTION: CPT

## 2021-03-05 PROCEDURE — 92611 MOTION FLUOROSCOPY/SWALLOW: CPT

## 2021-03-05 PROCEDURE — 25010000002 HYDROMORPHONE PER 4 MG: Performed by: NURSE PRACTITIONER

## 2021-03-05 PROCEDURE — 97530 THERAPEUTIC ACTIVITIES: CPT | Performed by: PHYSICAL THERAPIST

## 2021-03-05 PROCEDURE — 85025 COMPLETE CBC W/AUTO DIFF WBC: CPT | Performed by: NURSE PRACTITIONER

## 2021-03-05 PROCEDURE — 97606 NEG PRS WND THER DME>50 SQCM: CPT | Performed by: PHYSICAL THERAPIST

## 2021-03-05 PROCEDURE — 80202 ASSAY OF VANCOMYCIN: CPT | Performed by: NURSE PRACTITIONER

## 2021-03-05 PROCEDURE — 94799 UNLISTED PULMONARY SVC/PX: CPT

## 2021-03-05 PROCEDURE — 25010000002 CEFAZOLIN PER 500 MG: Performed by: NURSE PRACTITIONER

## 2021-03-05 PROCEDURE — 25810000003 SODIUM CHLORIDE 0.9 % WITH KCL 20 MEQ 20-0.9 MEQ/L-% SOLUTION: Performed by: NURSE PRACTITIONER

## 2021-03-05 RX ORDER — HYDROMORPHONE HYDROCHLORIDE 1 MG/ML
0.5 INJECTION, SOLUTION INTRAMUSCULAR; INTRAVENOUS; SUBCUTANEOUS ONCE
Status: COMPLETED | OUTPATIENT
Start: 2021-03-05 | End: 2021-03-05

## 2021-03-05 RX ORDER — CYCLOBENZAPRINE HCL 10 MG
10 TABLET ORAL 3 TIMES DAILY PRN
Start: 2021-03-05 | End: 2021-07-01

## 2021-03-05 RX ORDER — MULTIPLE VITAMINS W/ MINERALS TAB 9MG-400MCG
1 TAB ORAL DAILY
Start: 2021-03-06

## 2021-03-05 RX ORDER — SODIUM CHLORIDE AND POTASSIUM CHLORIDE 150; 900 MG/100ML; MG/100ML
100 INJECTION, SOLUTION INTRAVENOUS CONTINUOUS
Start: 2021-03-05 | End: 2021-03-26 | Stop reason: HOSPADM

## 2021-03-05 RX ORDER — AMOXICILLIN 250 MG
2 CAPSULE ORAL 2 TIMES DAILY
Start: 2021-03-05 | End: 2021-06-16

## 2021-03-05 RX ORDER — BISACODYL 10 MG
10 SUPPOSITORY, RECTAL RECTAL DAILY PRN
Start: 2021-03-05 | End: 2021-07-01

## 2021-03-05 RX ORDER — OXYCODONE AND ACETAMINOPHEN 10; 325 MG/1; MG/1
1 TABLET ORAL EVERY 4 HOURS PRN
Start: 2021-03-05 | End: 2021-03-11

## 2021-03-05 RX ORDER — ZINC SULFATE 50(220)MG
220 CAPSULE ORAL DAILY
Start: 2021-03-05 | End: 2021-03-26 | Stop reason: HOSPADM

## 2021-03-05 RX ORDER — POLYETHYLENE GLYCOL 3350 17 G/17G
17 POWDER, FOR SOLUTION ORAL DAILY
Start: 2021-03-06 | End: 2021-06-22

## 2021-03-05 RX ORDER — HEPARIN SODIUM 5000 [USP'U]/ML
5000 INJECTION, SOLUTION INTRAVENOUS; SUBCUTANEOUS EVERY 12 HOURS SCHEDULED
Start: 2021-03-05 | End: 2021-06-16

## 2021-03-05 RX ORDER — ACETAMINOPHEN 325 MG/1
650 TABLET ORAL EVERY 4 HOURS PRN
Start: 2021-03-05 | End: 2021-03-26 | Stop reason: HOSPADM

## 2021-03-05 RX ORDER — OXYCODONE AND ACETAMINOPHEN 7.5; 325 MG/1; MG/1
1 TABLET ORAL EVERY 4 HOURS PRN
Start: 2021-03-05 | End: 2021-03-11

## 2021-03-05 RX ORDER — VALACYCLOVIR HYDROCHLORIDE 500 MG/1
500 TABLET, FILM COATED ORAL
Start: 2021-03-06 | End: 2021-06-21 | Stop reason: SDUPTHER

## 2021-03-05 RX ORDER — PSEUDOEPHEDRINE HCL 30 MG
100 TABLET ORAL 2 TIMES DAILY
Start: 2021-03-05 | End: 2021-07-01

## 2021-03-05 RX ORDER — SACCHAROMYCES BOULARDII 250 MG
250 CAPSULE ORAL 2 TIMES DAILY
Start: 2021-03-05 | End: 2021-03-26 | Stop reason: HOSPADM

## 2021-03-05 RX ORDER — NALOXONE HCL 0.4 MG/ML
0.1 VIAL (ML) INJECTION
Start: 2021-03-05 | End: 2021-07-01

## 2021-03-05 RX ORDER — LABETALOL HYDROCHLORIDE 5 MG/ML
20 INJECTION, SOLUTION INTRAVENOUS
Start: 2021-03-05 | End: 2021-03-26 | Stop reason: HOSPADM

## 2021-03-05 RX ORDER — GABAPENTIN 100 MG/1
100 CAPSULE ORAL EVERY 12 HOURS SCHEDULED
Start: 2021-03-05 | End: 2021-06-09 | Stop reason: SDUPTHER

## 2021-03-05 RX ORDER — ASCORBIC ACID 500 MG
500 TABLET ORAL 2 TIMES DAILY
Start: 2021-03-05 | End: 2021-03-26 | Stop reason: HOSPADM

## 2021-03-05 RX ORDER — BUDESONIDE 0.5 MG/2ML
0.5 INHALANT ORAL 2 TIMES DAILY PRN
Start: 2021-03-05 | End: 2021-07-01

## 2021-03-05 RX ORDER — CLONIDINE HYDROCHLORIDE 0.1 MG/1
0.1 TABLET ORAL 2 TIMES DAILY PRN
Start: 2021-03-05 | End: 2021-03-26 | Stop reason: HOSPADM

## 2021-03-05 RX ORDER — ONDANSETRON 4 MG/1
4 TABLET, FILM COATED ORAL EVERY 6 HOURS PRN
Start: 2021-03-05 | End: 2021-09-10

## 2021-03-05 RX ORDER — DIAZEPAM 2 MG/1
2 TABLET ORAL EVERY 6 HOURS PRN
Start: 2021-03-05 | End: 2021-03-10

## 2021-03-05 RX ADMIN — LEVOTHYROXINE SODIUM 75 MCG: 25 TABLET ORAL at 05:08

## 2021-03-05 RX ADMIN — OXYCODONE HYDROCHLORIDE AND ACETAMINOPHEN 1 TABLET: 10; 325 TABLET ORAL at 21:24

## 2021-03-05 RX ADMIN — Medication 220 MG: at 18:00

## 2021-03-05 RX ADMIN — SODIUM CHLORIDE, PRESERVATIVE FREE 10 ML: 5 INJECTION INTRAVENOUS at 21:25

## 2021-03-05 RX ADMIN — Medication 250 MG: at 21:24

## 2021-03-05 RX ADMIN — DOCUSATE SODIUM 50 MG AND SENNOSIDES 8.6 MG 2 TABLET: 8.6; 5 TABLET, FILM COATED ORAL at 21:24

## 2021-03-05 RX ADMIN — FOLIC ACID 1 MG: 1 TABLET ORAL at 08:36

## 2021-03-05 RX ADMIN — CLONIDINE HYDROCHLORIDE 0.1 MG: 0.1 TABLET ORAL at 23:03

## 2021-03-05 RX ADMIN — ISOSORBIDE MONONITRATE 60 MG: 30 TABLET, EXTENDED RELEASE ORAL at 18:00

## 2021-03-05 RX ADMIN — CYCLOBENZAPRINE 10 MG: 10 TABLET, FILM COATED ORAL at 21:24

## 2021-03-05 RX ADMIN — CARVEDILOL 25 MG: 25 TABLET, FILM COATED ORAL at 08:36

## 2021-03-05 RX ADMIN — CEFAZOLIN SODIUM 2 G: 10 INJECTION, POWDER, FOR SOLUTION INTRAVENOUS at 14:36

## 2021-03-05 RX ADMIN — VANCOMYCIN HYDROCHLORIDE 1250 MG: 10 INJECTION, POWDER, LYOPHILIZED, FOR SOLUTION INTRAVENOUS at 06:14

## 2021-03-05 RX ADMIN — SODIUM CHLORIDE, PRESERVATIVE FREE 10 ML: 5 INJECTION INTRAVENOUS at 08:41

## 2021-03-05 RX ADMIN — DOCUSATE SODIUM 100 MG: 100 CAPSULE ORAL at 21:24

## 2021-03-05 RX ADMIN — MULTIPLE VITAMINS W/ MINERALS TAB 1 TABLET: TAB at 08:35

## 2021-03-05 RX ADMIN — HYDROMORPHONE HYDROCHLORIDE 0.5 MG: 1 INJECTION, SOLUTION INTRAMUSCULAR; INTRAVENOUS; SUBCUTANEOUS at 09:24

## 2021-03-05 RX ADMIN — OXYCODONE HYDROCHLORIDE AND ACETAMINOPHEN 1 TABLET: 7.5; 325 TABLET ORAL at 04:35

## 2021-03-05 RX ADMIN — OXYCODONE HYDROCHLORIDE AND ACETAMINOPHEN 1 TABLET: 10; 325 TABLET ORAL at 12:33

## 2021-03-05 RX ADMIN — HEPARIN SODIUM 5000 UNITS: 5000 INJECTION INTRAVENOUS; SUBCUTANEOUS at 06:14

## 2021-03-05 RX ADMIN — GABAPENTIN 100 MG: 100 CAPSULE ORAL at 08:35

## 2021-03-05 RX ADMIN — VALACYCLOVIR 500 MG: 500 TABLET, FILM COATED ORAL at 08:35

## 2021-03-05 RX ADMIN — POLYETHYLENE GLYCOL 3350 17 G: 17 POWDER, FOR SOLUTION ORAL at 08:34

## 2021-03-05 RX ADMIN — OXYCODONE HYDROCHLORIDE AND ACETAMINOPHEN 1 TABLET: 10; 325 TABLET ORAL at 16:43

## 2021-03-05 RX ADMIN — DOCUSATE SODIUM 50 MG AND SENNOSIDES 8.6 MG 2 TABLET: 8.6; 5 TABLET, FILM COATED ORAL at 08:34

## 2021-03-05 RX ADMIN — PANTOPRAZOLE SODIUM 40 MG: 40 TABLET, DELAYED RELEASE ORAL at 05:09

## 2021-03-05 RX ADMIN — DOCUSATE SODIUM 100 MG: 100 CAPSULE ORAL at 08:34

## 2021-03-05 RX ADMIN — HEPARIN SODIUM 5000 UNITS: 5000 INJECTION INTRAVENOUS; SUBCUTANEOUS at 18:00

## 2021-03-05 RX ADMIN — Medication 250 MG: at 08:35

## 2021-03-05 RX ADMIN — GUAIFENESIN 600 MG: 600 TABLET, EXTENDED RELEASE ORAL at 08:35

## 2021-03-05 RX ADMIN — OXYCODONE HYDROCHLORIDE AND ACETAMINOPHEN 500 MG: 500 TABLET ORAL at 21:24

## 2021-03-05 RX ADMIN — VANCOMYCIN HYDROCHLORIDE 1250 MG: 10 INJECTION, POWDER, LYOPHILIZED, FOR SOLUTION INTRAVENOUS at 18:00

## 2021-03-05 RX ADMIN — OXYCODONE HYDROCHLORIDE AND ACETAMINOPHEN 500 MG: 500 TABLET ORAL at 08:35

## 2021-03-05 RX ADMIN — OXYCODONE HYDROCHLORIDE AND ACETAMINOPHEN 1 TABLET: 10; 325 TABLET ORAL at 08:37

## 2021-03-05 RX ADMIN — CEFAZOLIN SODIUM 2 G: 10 INJECTION, POWDER, FOR SOLUTION INTRAVENOUS at 05:09

## 2021-03-05 RX ADMIN — GUAIFENESIN 600 MG: 600 TABLET, EXTENDED RELEASE ORAL at 21:25

## 2021-03-05 RX ADMIN — PREDNISONE 5 MG: 5 TABLET ORAL at 08:36

## 2021-03-05 RX ADMIN — POTASSIUM CHLORIDE AND SODIUM CHLORIDE 100 ML/HR: 900; 150 INJECTION, SOLUTION INTRAVENOUS at 21:25

## 2021-03-05 RX ADMIN — POTASSIUM CHLORIDE AND SODIUM CHLORIDE 100 ML/HR: 900; 150 INJECTION, SOLUTION INTRAVENOUS at 08:40

## 2021-03-05 RX ADMIN — CEFAZOLIN SODIUM 2 G: 10 INJECTION, POWDER, FOR SOLUTION INTRAVENOUS at 21:25

## 2021-03-05 RX ADMIN — ISOSORBIDE MONONITRATE 60 MG: 30 TABLET, EXTENDED RELEASE ORAL at 08:36

## 2021-03-05 RX ADMIN — CARVEDILOL 25 MG: 25 TABLET, FILM COATED ORAL at 18:00

## 2021-03-05 RX ADMIN — HYDROMORPHONE HYDROCHLORIDE 2 MG: 1 INJECTION, SOLUTION INTRAMUSCULAR; INTRAVENOUS; SUBCUTANEOUS at 23:25

## 2021-03-05 RX ADMIN — GABAPENTIN 100 MG: 100 CAPSULE ORAL at 21:25

## 2021-03-05 NOTE — THERAPY WOUND CARE TREATMENT
Acute Care - Wound/Debridement Re-Evaluation  [facility]     Patient Name: Tawny Shin  : 1955  MRN: [id number]  Today's Date: 3/5/2021                Admit Date: 2021    Visit Dx:    ICD-10-CM ICD-9-CM   1. Stenosis of cervical spine with myelopathy (CMS/[facility])  M48.02 723.0    G99.2 336.3   2. Gait abnormality  R26.9 781.2   3. Spinal stenosis, lumbar region, with neurogenic claudication  M48.062 724.03   4. Discitis of lumbar region  M46.46 722.93   5. Osteomyelitis of lumbar spine (CMS/[facility])  M46.26 730.28   6. Staphylococcus aureus bacteremia  R78.81 790.7    B95.61 041.11   7. Impaired mobility and ADLs  Z74.09 V49.89    Z78.9    8. Open wound of right hip, subsequent encounter  S71.001D V58.89     890.0       Patient Active Problem List   Diagnosis   • Eustachian tube dysfunction   • Sensorineural hearing loss   • Lumbago of multiple sites in spine with sciatica   • Spondylolisthesis of lumbar region   • Coronary artery disease   • Hyperlipidemia   • Hypertension   • Myalgia due to statin   • S/P coronary artery stent placement   • Pacemaker   • Seropositive rheumatoid arthritis of multiple sites (CMS/[facility])   • Sick sinus syndrome (CMS/[facility])   • Other osteoporosis without current pathological fracture   • Allergic-infective asthma   • Arthritis of both knees   • Vasovagal syncope   • Bilateral bunions   • Bronchitis   • Cardiac pacemaker syndrome   • Charcot's joint of foot, left   • Constipation   • 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   • Intrinsic asthma   • Left carotid bruit   • Neutropenia (CMS/[facility])   • Obesity   • Primary osteoarthritis of left knee   • Psoriasis vulgaris   • Recurrent acute serous otitis media of both ears   • S/P lumbar laminectomy   • Thrombocytopenia (CMS/[facility])   • Hypothyroidism   • Vitamin D deficiency   • Myxedema heart disease   • Spondylolisthesis of lumbar region   • Syncope, cardiogenic   • Rupture of gluteus minimus tendon   • Muscle tear   • Syncope, recurrent   •  Leukocytosis   • Headache   • Elevated CPK   • Staphylococcus aureus bacteremia   • Right hip pain, suspected infection   • Obesity (BMI 30-39.9)   • Stenosis of cervical spine with myelopathy (CMS/[facility])   • Spinal stenosis, lumbar region, with neurogenic claudication   • Discitis of lumbar region   • Osteomyelitis of lumbar spine (CMS/[facility])        Past Medical History:   Diagnosis Date   • Arthritis    • Asthma    • Chronic sinusitis    • Coronary artery disease    • Eustachian tube dysfunction    • Heart disease    • Herpes simplex    • Hyperlipidemia    • Hypertension    • Knee dislocation    • Labral tear of right hip joint    • Laryngitis sicca    • Laryngitis, chronic    • MI (myocardial infarction) (CMS/[facility])    • Otorrhea    • Sensorineural hearing loss    • Sjogren's disease (CMS/[facility])         Past Surgical History:   Procedure Laterality Date   • A-V CARDIAC PACEMAKER INSERTION  2016   • ATRIAL CARDIAC PACEMAKER INSERTION     • CARDIAC CATHETERIZATION     • CATARACT EXTRACTION     • CERVICAL CORPECTOMY N/A 3/3/2021    Procedure: CERVICAL 6 CORPECTOMY WITH TITANIUM CAGE WITH NEURO MONITORING;  Surgeon: Bandar Shea MD;  Location: [facility] OR;  Service: Neurosurgery;  Laterality: N/A;   • COLONOSCOPY  11/08/2011    One fold in the ascending colon which showed ulcer otherwise normal exam   • COLONOSCOPY  11/12/2004    Normal exam repeat in five years   • CORONARY ANGIOPLASTY WITH STENT PLACEMENT      X 2; 2013 & 2014   • ENDOSCOPY  07/10/2014    Normal exam   • HIP ABDUCTION TENOTOMY BILATERAL Right 1/14/2021    Procedure: RIGHT HIP GLUTEUS MEDLUS / MINIMUS REPAIR, POSSIBLE ACHILLES ALLOGRAFT;  Surgeon: Nino Carlson MD;  Location: [facility] OR;  Service: Orthopedics;  Laterality: Right;   • INCISION AND DRAINAGE HIP Right 2/9/2021    Procedure: HIP INCISION AND DRAINAGE;  Surgeon: Nino Carlson MD;  Location:  [facility] OR;  Service: Orthopedics;  Laterality: Right;   • JOINT REPLACEMENT     • LUMBAR FUSION N/A  1/19/2018    Procedure: L3-4,L4-5 DECOMPRESSION, POSTERIOR SPINAL FUSION WITH INSTRUMENTATION;  Surgeon: Fortino Oropeza MD;  Location:  [facility] OR;  Service:    • LUMBAR LAMINECTOMY WITH FUSION Left 1/17/2018    Procedure: LEFT L3-4 L4-5 LATERAL LUMBAR INTERBODY FUSION;  Surgeon: Fortino Oropeza MD;  Location:  [facility] OR;  Service:    • MYRINGOTOMY W/ TUBES  09/04/2014    TUBES NO LONGER IN PLACE   • OTHER SURGICAL HISTORY      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 so hardware was removed and spacers were placed   • REPLACEMENT TOTAL KNEE Right            Wound 02/09/21 1715 Right hip Incision (Active)   Wound Image    03/05/21 1015   Dressing Appearance intact 03/05/21 1015   Closure Other (Comment) 03/05/21 1015   Base blanchable;granulating;pink;slough;subcutaneous 03/05/21 1015   Red (%), Wound Tissue Color 90 03/05/21 1015   Yellow (%), Wound Tissue Color 10 03/05/21 1015   Periwound blanchable;blistered;edematous;macerated;redness 03/05/21 1015   Periwound Temperature warm 03/05/21 1015   Periwound Skin Turgor firm 03/05/21 1015   Edges open 03/05/21 1015   Wound Length (cm) 18 cm 03/05/21 1015   Wound Width (cm) 7 cm 03/05/21 1015   Wound Depth (cm) 12 cm 03/05/21 1015   Drainage Characteristics/Odor serous;tan 03/05/21 1015   Drainage Amount moderate 03/05/21 1015   Care, Wound debrided;negative pressure wound therapy 03/05/21 1015   Dressing Care dressing changed 03/05/21 1015   Periwound Care barrier film applied;barrier ointment applied 03/05/21 1015   Wound Output (mL) 75 03/05/21 1015       Wound 03/03/21 1446 Right anterior neck Incision (Active)   Dressing Appearance open to air 03/04/21 2306   Closure Liquid skin adhesive 03/04/21 2306   Drainage Amount none 03/04/21 2306   Dressing Care open to air 03/04/21 2306       Wound 03/03/21 2015 Right lateral greater trochanter Skin Tear (Active)   Dressing Appearance dry;intact 03/04/21 1645   Closure ABIEL 03/04/21 9073   Base dressing in place, unable to  visualize 03/04/21 2306   Drainage Characteristics/Odor serous 03/04/21 2306   Drainage Amount small 03/04/21 2306   Care, Wound negative pressure wound therapy 03/04/21 2306       NPWT (Negative Pressure Wound Therapy) 02/09/21 1741 right hip (Active)   Therapy Setting intermittent therapy 03/05/21 1015   Dressing foam, black;other (see comments) 03/05/21 1015   Instillation normal saline 03/05/21 1015   Pressure Setting 125 mmHg 03/05/21 1015   Sponges Inserted 4 03/05/21 1015   Finger sweep complete Yes 03/05/21 1015         WOUND DEBRIDEMENT                        PT Assessment (last 12 hours)      PT Evaluation and Treatment     Row Name 03/05/21 1015          Physical Therapy Time and Intention    Subjective Information  complains of;fatigue;pain  -MS     Document Type  re-evaluation  -MS     Mode of Treatment  physical therapy  -MS     Row Name 03/05/21 1015          General Information    Patient Profile Reviewed  yes  -MS     Pertinent History of Current Functional Problem  s/p C6 cervical spinal surgery, s/p I and D of R hip during previous admission  -MS     Existing Precautions/Restrictions  fall;spinal;other (see comments) cervical collar at all times, R LE WBAT, wound vac  -MS     Risks Reviewed  patient:;family:;increased drainage;increased discomfort;lines disloged  -MS     Benefits Reviewed  patient:;decrease pain;improve skin integrity;increase knowledge  -MS     Barriers to Rehab  previous functional deficit;physical barrier  -MS     Row Name 03/05/21 1015          Cognition    Orientation Status (Cognition)  oriented x 4  -MS     Row Name 03/05/21 1015          Pain    Additional Documentation  Pain Scale: Numbers Pre/Post-Treatment (Group)  -MS     Row Name 03/05/21 1015          Pain Scale: Numbers Pre/Post-Treatment    Pretreatment Pain Rating  3/10  -MS     Posttreatment Pain Rating  3/10  -MS     Pain Location - Orientation  lower  -MS     Pain Location  back  -MS     Pre/Posttreatment Pain  Comment  nursing gave pain medication IV and pill form  -MS     Row Name             Wound 03/03/21 1446 Right anterior neck Incision    Wound - Properties Group Placement Date: 03/03/21  -HW Placement Time: 1446  -HW Present on Hospital Admission: N  -HW Side: Right  -TANIKA Orientation: anterior  -HW Location: neck  -HW Primary Wound Type: Incision  -HW    Retired Wound - Properties Group Date first assessed: 03/03/21  -HW Time first assessed: 1446  -HW Present on Hospital Admission: N  -HW Side: Right  -TANIKA Location: neck  -HW Primary Wound Type: Incision  -HW    Row Name 03/05/21 1015          Wound 02/09/21 1715 Right hip Incision    Wound - Properties Group Placement Date: 02/09/21  -SH Placement Time: 1715 -SH Present on Hospital Admission: Y  -SH Side: Right  -SH Location: hip  -SH Primary Wound Type: Incision  -SH    Wound Image  Images linked: 2  -MS     Dressing Appearance  intact  -MS     Closure  Other (Comment) NPWT dressing  -MS     Base  blanchable;granulating;pink;slough;subcutaneous  -MS     Red (%), Wound Tissue Color  90  -MS     Yellow (%), Wound Tissue Color  10 slough  -MS     Periwound  blanchable;blistered;edematous;macerated;redness  -MS     Periwound Temperature  warm  -MS     Periwound Skin Turgor  firm  -MS     Edges  open  -MS     Wound Length (cm)  18 cm  -MS     Wound Width (cm)  7 cm  -MS     Wound Depth (cm)  12 cm  -MS     Drainage Characteristics/Odor  serous;tan  -MS     Drainage Amount  moderate  -MS     Care, Wound  debrided;negative pressure wound therapy  -MS     Dressing Care  dressing changed  -MS     Periwound Care  barrier film applied;barrier ointment applied  -MS     Wound Output (mL)  75 275mL in canister, canister changed  -MS     Retired Wound - Properties Group Date first assessed: 02/09/21  -SH Time first assessed: 1715 -SH Present on Hospital Admission: Y  -SH Side: Right  -SH Location: hip  -SH Primary Wound Type: Incision  -SH    Row Name             Wound 03/03/21  2015 Right lateral greater trochanter Skin Tear    Wound - Properties Group Placement Date: 03/03/21  -SM Placement Time: 2015 -SM Side: Right  -SM Orientation: lateral  -SM Location: greater trochanter  -SM Primary Wound Type: Skin tear  -SM Additional Comments: arrived to unit w/ skin tear, open to air  -SM    Retired Wound - Properties Group Date first assessed: 03/03/21  -SM Time first assessed: 2015 -SM Side: Right  -SM Location: greater trochanter  -SM Primary Wound Type: Skin tear  -SM Additional Comments: arrived to unit w/ skin tear, open to air  -SM    Row Name 03/05/21 1015          NPWT (Negative Pressure Wound Therapy) 02/09/21 1741 right hip    NPWT (Negative Pressure Wound Therapy) - Properties Group Placement Date: 02/09/21 -SH Placement Time: 1741 -SH Location: right hip  -SH Additional Comments: 1 pc of black sponge  -SH    Therapy Setting  intermittent therapy  -MS     Dressing  foam, black;other (see comments) black and grey veraflow foams  -MS     Instillation  normal saline  -MS     Pressure Setting  125 mmHg  -MS     Sponges Inserted  4 1 grey, 3 black  -MS     Finger sweep complete  Yes  -MS     Retired NPWT (Negative Pressure Wound Therapy) - Properties Group Placement Date: 02/09/21 -SH Placement Time: 1741 -SH Location: right hip  -SH Additional Comments: 1 pc of black sponge  -SH    Row Name 03/05/21 1015          Plan of Care Review    Plan of Care Reviewed With  patient;son  -MS     Progress  no change  -MS     Outcome Summary  PT re-evaluation for care of the NPWT dressing. The wound cultures have come back and the wound is postive for staphylococcus aureas, mixed gram negative brett, and enterococcus species. She also has slough present in the wound bed so switching to using the instillation with normal saline was the better course of action. The wound  demonstrates healing with granulation tissue around and on the TFL muscle, however there is slough present deep and  superficially in the wound. The wound does measure to be bigger in length and depth, which is partly due to her being more swollen in the area around the wound. Now that the patient is mobile after her last surgery, hopefully this will help with edema control. I sharp debrided the slough that I could reach comfortably for the patient and the instillation wound vac dressing was placed with a good seal. Duoderm was placed around the wound edges due to skin breakdown.  The infusion rate for the normal saline is 12mL every 3 hours with 96mL used every 24hrs. PT will continue to follow for dressing changes as needed and plan to change on Monday whether she is in a BHP bed or LTACH bed.   -MS     Row Name 03/05/21 1015          Physical Therapy Goals    Wound Care Goal Selection (PT)  wound care, PT goal 1;wound care, PT goal 2  -MS     Row Name 03/05/21 1015          Wound Care Goal 1 (PT)    Wound Care Goal 1 (PT)  R hip wound will decrease in length by 2 cm from 18cm to 16cm.  -MS     Time Frame (Wound Care Goal 1, PT)  long term goal (LTG);by discharge  -MS     Progress/Outcome (Wound Care Goal 1, PT)  goal ongoing  -MS     Row Name 03/05/21 1015          Wound Care Goal 2 (PT)    Wound Care Goal 2 (PT)  R hip wound will decrease in depth by 2 cm, from 12 cm to 10cm.   -MS     Time Frame (Wound Care Goal 2, PT)  long term goal (LTG);by discharge  -MS     Progress/Outcome (Wound Care Goal 2, PT)  goal ongoing  -MS     Row Name 03/05/21 1015          Therapy Assessment/Plan (PT)    Patient/Family Therapy Goals Statement (PT)  wound healing  -MS     Rehab Potential (PT)  good, to achieve stated therapy goals  -MS     Criteria for Skilled Interventions Met (PT)  yes;meets criteria;skilled treatment is necessary  -MS     Predicted Duration of Therapy Intervention (PT)  until discharge  -MS       User Key  (r) = Recorded By, (t) = Taken By, (c) = Cosigned By    Initials Name Provider Type    Nicole Palacios, PT, DPT,  NCS Physical Therapist    Ewelina Eubanks RN Registered Nurse    Laurie Ritter, RN Registered Nurse    Benjie Melendez, RN Registered Nurse    Brenna Hector, RN Registered Nurse        Physical Therapy Education                 Title: PT OT SLP Therapies (In Progress)     Topic: Physical Therapy (In Progress)     Point: Mobility training (Done)     Learning Progress Summary           Patient Acceptance, E, VU by MS at 3/4/2021 1110    Comment: role of PT in her care, spinal restrictions, new WBAT on R LE                   Point: Home exercise program (Not Started)     Learner Progress:  Not documented in this visit.          Point: Body mechanics (Not Started)     Learner Progress:  Not documented in this visit.          Point: Precautions (Done)     Learning Progress Summary           Patient Acceptance, E, VU by MS at 3/5/2021 1113    Comment: care for wound vac dressing and purpose of instillation    Acceptance, E, VU by MS at 3/4/2021 1110    Comment: role of PT in her care, spinal restrictions, new WBAT on R LE                               User Key     Initials Effective Dates Name Provider Type Discipline    MS 06/19/18 -  Nicole Olson, PT, DPT, NCS Physical Therapist PT                Recommendation and Plan  Anticipated Discharge Disposition (PT): home with assist, home with home health(once discharged from LTACH)  Planned Therapy Interventions (PT): wound care, patient/family education  Therapy Frequency (PT): 3 times/wk(check daily, change MWF or as neede)  Plan of Care Reviewed With: patient, son   Progress: no change       Progress: no change  Outcome Summary: PT re-evaluation for care of the NPWT dressing. The wound cultures have come back and the wound is postive for staphylococcus aureas, mixed gram negative brett, and enterococcus species. She also has slough present in the wound bed so switching to using the instillation with normal saline was the better course of action. The  wound  demonstrates healing with granulation tissue around and on the TFL muscle, however there is slough present deep and superficially in the wound. The wound does measure to be bigger in length and depth, which is partly due to her being more swollen in the area around the wound. Now that the patient is mobile after her last surgery, hopefully this will help with edema control. I sharp debrided the slough that I could reach comfortably for the patient and the instillation wound vac dressing was placed with a good seal. Duoderm was placed around the wound edges due to skin breakdown.  The infusion rate for the normal saline is 12mL every 3 hours with 96mL used every 24hrs. PT will continue to follow for dressing changes as needed and plan to change on Monday whether she is in a BHP bed or LTACH bed.   Plan of Care Reviewed With: patient, son            Time Calculation  PT Charges     Row Name 03/05/21 1121 03/05/21 0900          Time Calculation    Start Time  1016  -MS  0900  -MS     Stop Time  1040  -MS  1015  -MS     Time Calculation (min)  24 min  -MS  75 min  -MS     PT Received On  --  03/05/21  -MS     PT Goal Re-Cert Due Date  --  03/19/21  -MS        Time Calculation- PT    Total Timed Code Minutes- PT  24 minute(s)  -MS  --        Timed Charges    60466 - PT Therapeutic Activity Minutes  24  -MS  --       User Key  (r) = Recorded By, (t) = Taken By, (c) = Cosigned By    Initials Name Provider Type    MS Nicole Olson, PT, DPT, NCS Physical Therapist            Therapy Charges for Today     Code Description Service Date Service Provider Modifiers Qty    53246307568 HC PT THERAPEUTIC ACT EA 15 MIN 3/4/2021 Nicole Olson, PT, DPT, NCS GP 2    00740350335 HC PT RE-EVAL ESTABLISHED PLAN 2 3/4/2021 Nicole Olson, PT, DPT, NCS GP 1    50586623420 HC PT THERAPEUTIC ACT EA 15 MIN 3/4/2021 Nicole Olson, PT, DPT, NCS GP 2    42935300786 HC GAIT TRAINING EA 15 MIN 3/4/2021 Nicole Olson, PT, DPT,  NCS GP 1    33967058330 HC PT NEG PRESS WOUND OVER 50SQCM DME3 3/5/2021 Nicole Olson, PT, DPT, NCS  1    95900766675 HC PT RE-EVAL ESTABLISHED PLAN 2 3/5/2021 Nicole Olson PT, DPT, NCS GP 1            PT G-Codes  Outcome Measure Options: AM-PAC 6 Clicks Basic Mobility (PT)  AM-PAC 6 Clicks Score (PT): 16  AM-PAC 6 Clicks Score (OT): 19       Nicole Olson, PT, DPT, NCS  3/5/2021

## 2021-03-05 NOTE — THERAPY TREATMENT NOTE
Patient Name: Tawny Shin  : 1955    MRN: [id number]                              Today's Date: 3/5/2021       Admit Date: 2021    Visit Dx:     ICD-10-CM ICD-9-CM   1. Stenosis of cervical spine with myelopathy (CMS/[facility])  M48.02 723.0    G99.2 336.3   2. Gait abnormality  R26.9 781.2   3. Spinal stenosis, lumbar region, with neurogenic claudication  M48.062 724.03   4. Discitis of lumbar region  M46.46 722.93   5. Osteomyelitis of lumbar spine (CMS/[facility])  M46.26 730.28   6. Staphylococcus aureus bacteremia  R78.81 790.7    B95.61 041.11   7. Impaired mobility and ADLs  Z74.09 V49.89    Z78.9    8. Open wound of right hip, subsequent encounter  S71.001D V58.89     890.0   9. Dysphagia, unspecified type  R13.10 787.20     Patient Active Problem List   Diagnosis   • Eustachian tube dysfunction   • Sensorineural hearing loss   • Lumbago of multiple sites in spine with sciatica   • Spondylolisthesis of lumbar region   • Coronary artery disease   • Hyperlipidemia   • Hypertension   • Myalgia due to statin   • S/P coronary artery stent placement   • Pacemaker   • Seropositive rheumatoid arthritis of multiple sites (CMS/HCC)   • Sick sinus syndrome (CMS/[facility])   • Other osteoporosis without current pathological fracture   • Allergic-infective asthma   • Arthritis of both knees   • Vasovagal syncope   • Bilateral bunions   • Bronchitis   • Cardiac pacemaker syndrome   • Charcot's joint of foot, left   • Constipation   • 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   • Intrinsic asthma   • Left carotid bruit   • Neutropenia (CMS/[facility])   • Obesity   • Primary osteoarthritis of left knee   • Psoriasis vulgaris   • Recurrent acute serous otitis media of both ears   • S/P lumbar laminectomy   • Thrombocytopenia (CMS/[facility])   • Hypothyroidism   • Vitamin D deficiency   • Myxedema heart disease   • Spondylolisthesis of lumbar region   • Syncope, cardiogenic   • Rupture of gluteus minimus tendon   • Muscle tear   • Syncope,  recurrent   • Leukocytosis   • Headache   • Elevated CPK   • Staphylococcus aureus bacteremia   • Right hip pain, suspected infection   • Obesity (BMI 30-39.9)   • Stenosis of cervical spine with myelopathy (CMS/HCC)   • Spinal stenosis, lumbar region, with neurogenic claudication   • Discitis of lumbar region   • Osteomyelitis of lumbar spine (CMS/HCC)     Past Medical History:   Diagnosis Date   • Arthritis    • Asthma    • Chronic sinusitis    • Coronary artery disease    • Eustachian tube dysfunction    • Heart disease    • Herpes simplex    • Hyperlipidemia    • Hypertension    • Knee dislocation    • Labral tear of right hip joint    • Laryngitis sicca    • Laryngitis, chronic    • MI (myocardial infarction) (CMS/HCC)    • Otorrhea    • Sensorineural hearing loss    • Sjogren's disease (CMS/[facility])      Past Surgical History:   Procedure Laterality Date   • A-V CARDIAC PACEMAKER INSERTION  2016   • ATRIAL CARDIAC PACEMAKER INSERTION     • CARDIAC CATHETERIZATION     • CATARACT EXTRACTION     • CERVICAL CORPECTOMY N/A 3/3/2021    Procedure: CERVICAL 6 CORPECTOMY WITH TITANIUM CAGE WITH NEURO MONITORING;  Surgeon: Bandar Shea MD;  [facility];  Service: Neurosurgery;  Laterality: N/A;   • COLONOSCOPY  11/08/2011    One fold in the ascending colon which showed ulcer otherwise normal exam   • COLONOSCOPY  11/12/2004    Normal exam repeat in five years   • CORONARY ANGIOPLASTY WITH STENT PLACEMENT      X 2; 2013 & 2014   • ENDOSCOPY  07/10/2014    Normal exam   • HIP ABDUCTION TENOTOMY BILATERAL Right 1/14/2021    Procedure: RIGHT HIP GLUTEUS MEDLUS / MINIMUS REPAIR, POSSIBLE ACHILLES ALLOGRAFT;  Surgeon: Nino Carlson MD;  Location:  [facility] OR;  Service: Orthopedics;  Laterality: Right;   • INCISION AND DRAINAGE HIP Right 2/9/2021    Procedure: HIP INCISION AND DRAINAGE;  Surgeon: Nino Carlson MD;  Location:  [facility] OR;  Service: Orthopedics;  Laterality: Right;   • JOINT REPLACEMENT     • LUMBAR  FUSION N/A 1/19/2018    Procedure: L3-4,L4-5 DECOMPRESSION, POSTERIOR SPINAL FUSION WITH INSTRUMENTATION;  Surgeon: Fortino Oropeza MD;  Location:  [facility] OR;  Service:    • LUMBAR LAMINECTOMY WITH FUSION Left 1/17/2018    Procedure: LEFT L3-4 L4-5 LATERAL LUMBAR INTERBODY FUSION;  Surgeon: Fortino Oropeza MD;  Location:  [facility] OR;  Service:    • MYRINGOTOMY W/ TUBES  09/04/2014    TUBES NO LONGER IN PLACE   • OTHER SURGICAL HISTORY      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 so hardware was removed and spacers were placed   • REPLACEMENT TOTAL KNEE Right      General Information     Row Name 03/05/21 1400 03/05/21 1000       Physical Therapy Time and Intention    Document Type  therapy note (daily note)  -MS  therapy note (daily note)  -MS    Mode of Treatment  physical therapy  -MS  physical therapy  -MS    Row Name 03/05/21 1400 03/05/21 1000       General Information    Existing Precautions/Restrictions  fall;spinal;other (see comments) cervical collar at all times, R LE WBAT, wound vac  -MS  fall;spinal;other (see comments) cervical brace at all times, R LE WBAT  -MS    Row Name 03/05/21 1000          Cognition    Orientation Status (Cognition)  oriented x 4  -MS       User Key  (r) = Recorded By, (t) = Taken By, (c) = Cosigned By    Initials Name Provider Type    MS Wesley Nicole EVERARDO, PT, DPT, NCS Physical Therapist        Mobility     Row Name 03/05/21 1400 03/05/21 1000       Bed Mobility    Bed Mobility  supine-sit;sit-supine  -MS  --    Rolling Left [place] (Bed Mobility)  --  minimum assist (75% patient effort);verbal cues;nonverbal cues (demo/gesture)  -MS    Supine-Sit [place] (Bed Mobility)  minimum assist (75% patient effort);nonverbal cues (demo/gesture);verbal cues min A for R LE  -MS  --    Sit-Supine [place] (Bed Mobility)  minimum assist (75% patient effort) R LE  -MS  --    Sidelying-Sit [place] (Bed Mobility)  --  minimum assist (75% patient effort);2 person assist;verbal  cues;nonverbal cues (demo/gesture)  -MS    Assistive Device (Bed Mobility)  bed rails;head of bed elevated  -MS  bed rails;head of bed elevated  -MS    Row Name 03/05/21 1400 03/05/21 1000       Sit-Stand Transfer    Sit-Stand [place] (Transfers)  contact guard  -MS  contact guard  -MS    Assistive Device (Sit-Stand Transfers)  walker, front-wheeled  -MS  walker, front-wheeled  -MS    Row Name 03/05/21 1400 03/05/21 1000       Gait/Stairs (Locomotion)    [place] Level (Gait)  contact guard  -MS  contact guard  -MS    Assistive Device (Gait)  walker, front-wheeled  -MS  walker, front-wheeled  -MS    Distance in Feet (Gait)  20ft x3 focusing on decrease weight bearing through walker, improve R LE stance phase  -MS  20ft to bathroom and back, pt limited by fatigue  -MS    Row Name 03/05/21 1400 03/05/21 1000       Mobility    Extremity Weight-bearing Status  --  right lower extremity  -MS    Right Lower Extremity (Weight-bearing Status)  weight-bearing as tolerated (WBAT)  -MS  weight-bearing as tolerated (WBAT)  -MS      User Key  (r) = Recorded By, (t) = Taken By, (c) = Cosigned By    Initials Name Provider Type    MS Nicole Olson R, PT, DPT, NCS Physical Therapist        Obj/Interventions    No documentation.       Goals/Plan    No documentation.       Clinical Impression     [facility] Name 03/05/21 1400 03/05/21 1000       Pain    Additional Documentation  Pain Scale: Numbers Pre/Post-Treatment (Group)  -MS  Pain Scale: Numbers Pre/Post-Treatment (Group)  -MS    [facility] Name 03/05/21 1400 03/05/21 1000       Pain Scale: Numbers Pre/Post-Treatment    Pretreatment Pain Rating  5/10  -MS  3/10  -MS    Posttreatment Pain Rating  5/10  -MS  3/10  -MS    Pain Location - Side  Right  -MS  --    Pain Location  hip  -MS  --    Pain Intervention(s)  Medication (See MAR);Repositioned;Ambulation/increased activity  -MS  Medication (See MAR);Repositioned;Ambulation/increased activity  -MS    Row Name 03/05/21 1000           Plan of Care Review    Plan of Care Reviewed With  patient;son  -MS     Progress  no change  -MS     Outcome Summary  Pt fatigued from having NPWT dressing changed this AM. She attempted to go to the restroom, but was unsuccessful to void. She still fatigues quickly and has difficulty with maintaining an upright posture.  -MS     Row Name 03/05/21 1400          Positioning and Restraints    Post Treatment Position  bed  -MS     In Bed  fowlers;call light within reach;encouraged to call for assist;side rails up x2;with family/caregiver;with brace  -MS       User Key  (r) = Recorded By, (t) = Taken By, (c) = Cosigned By    Initials Name Provider Type    MS Nicole Olson, PT, DPT, NCS Physical Therapist        Outcome Measures    No documentation.       Physical Therapy Education                 Title: PT OT SLP Therapies (In Progress)     Topic: Physical Therapy (In Progress)     Point: Mobility training (Done)     Learning Progress Summary           Patient Acceptance, E, VU by MS at 3/4/2021 1110    Comment: role of PT in her care, spinal restrictions, new WBAT on R LE                   Point: Home exercise program (Not Started)     Learner Progress:  Not documented in this visit.          Point: Body mechanics (Not Started)     Learner Progress:  Not documented in this visit.          Point: Precautions (Done)     Learning Progress Summary           Patient Acceptance, E, VU by MS at 3/5/2021 1113    Comment: care for wound vac dressing and purpose of instillation    Acceptance, E, VU by MS at 3/4/2021 1110    Comment: role of PT in her care, spinal restrictions, new WBAT on R LE                               User Key     Initials Effective Dates Name Provider Type Discipline    MS 06/19/18 -  Nicole Olson, PT, DPT, NCS Physical Therapist PT              PT Recommendation and Plan  Planned Therapy Interventions (PT): wound care, patient/family education  Plan of Care Reviewed With: patient, son  Progress: no  change  Outcome Summary: PT re-evaluation for care of the NPWT dressing. The wound cultures have come back and the wound is postive for staphylococcus aureas, mixed gram negative brett, and enterococcus species. She also has slough present in the wound bed so switching to using the instillation with normal saline was the better course of action. The wound  demonstrates healing with granulation tissue around and on the TFL muscle, however there is slough present deep and superficially in the wound. The wound does measure to be bigger in length and depth, which is partly due to her being more swollen in the area around the wound. Now that the patient is mobile after her last surgery, hopefully this will help with edema control. I sharp debrided the slough that I could reach comfortably for the patient and the instillation wound vac dressing was placed with a good seal. Duoderm was placed around the wound edges due to skin breakdown.  The infusion rate for the normal saline is 12mL every 3 hours with 96mL used every 24hrs. PT will continue to follow for dressing changes as needed and plan to change on Monday whether she is in a BHP bed or LTACH bed.      Time Calculation:   PT Charges     Row Name 03/05/21 1400 03/05/21 1121 03/05/21 0900       Time Calculation    Start Time  1400  -MS  1016  -MS  0900  -MS    Stop Time  1414  -MS  1040  -MS  1015  -MS    Time Calculation (min)  14 min  -MS  24 min  -MS  75 min  -MS    PT Received On  --  --  03/05/21  -MS    PT Goal Re-Cert Due Date  --  --  03/19/21  -MS       Time Calculation- PT    Total Timed Code Minutes- PT  14 minute(s)  -MS  24 minute(s)  -MS  --       Timed Charges    56863 - PT Therapeutic Activity Minutes  14  -MS  24  -MS  --      User Key  (r) = Recorded By, (t) = Taken By, (c) = Cosigned By    Initials Name Provider Type    Nicole Palacios R, PT, DPT, NCS Physical Therapist        Therapy Charges for Today     Code Description Service Date Service  Provider Modifiers Qty    [id number]  PT THERAPEUTIC ACT EA 15 MIN 3/4/2021 Nicole Olson, PT, DPT, NCS GP 2    02456791905 HC PT RE-EVAL ESTABLISHED PLAN 2 3/4/2021 Nicole Olson, PT, DPT, NCS GP 1    [id number]  PT THERAPEUTIC ACT EA 15 MIN 3/4/2021 Nicole Olson, PT, DPT, NCS GP 2    04059622173 HC GAIT TRAINING EA 15 MIN 3/4/2021 Nicole Olson, PT, DPT, NCS GP 1    [id number]  PT NEG PRESS WOUND OVER 50SQCM DME3 3/5/2021 Nicole Olson, PT, DPT, NCS  1    [id number] HC PT RE-EVAL ESTABLISHED PLAN 2 3/5/2021 Nicole Olson, PT, DPT, NCS GP 1    [id number]  PT THERAPEUTIC ACT EA 15 MIN 3/5/2021 Nicole Olson, PT, DPT, NCS GP 3          PT G-Codes  Outcome Measure Options: AM-PAC 6 Clicks Basic Mobility (PT)  AM-PAC 6 Clicks Score (PT): 16  AM-PAC 6 Clicks Score (OT): 19    Nicole Olson, PT, DPT, NCS  3/5/2021

## 2021-03-05 NOTE — THERAPY EVALUATION
Acute Care - Speech Language Pathology   Swallow Initial Evaluation [facility]     Patient Name: Tawny Shin  : 1955  MRN: [id number]  Today's Date: 3/5/2021               Admit Date: 2021  Clinical bedside swallow evaluation completed. Regular solids, pudding thick, and thin consistencies were presented. Pt had audible swallows with thin. She complained of pain with swallowing at times and displayed increased facial grimacing with the regular solid. No overt s/s of aspiration were observed. Pt's reports that she had some difficulty taking meds with water and sips of plain water earlier, but that she was partially reclined at those time and realized that she needed to sit up straighter when eating/drinking. SLP gave pt the option of trying mechanical soft solids due to possible increased pain when swallowing the regular solid during assessment. She was agreeable to changing diet consistency. Educated pt on trying meds whole or split/crushed with applesauce as well.  Recommend:  1. Mechanical soft solids and thin liquids (can upgrade upon pt request or with improvement of pain)  2. Meds whole or split/crushed as needed with applesauce  3. Pt at edge of bed or in chair for meals when possible  4. Oral care BID and PRN  5. SLP will follow up to monitor diet tolerance  6. If pt has increased lung congestion or s/s of aspiration, modified barium swallow study could be considered  Dillon Pierson, CCC-SLP 3/5/2021 12:28 CST    Visit Dx:     ICD-10-CM ICD-9-CM   1. Stenosis of cervical spine with myelopathy (CMS/HCC)  M48.02 723.0    G99.2 336.3   2. Gait abnormality  R26.9 781.2   3. Spinal stenosis, lumbar region, with neurogenic claudication  M48.062 724.03   4. Discitis of lumbar region  M46.46 722.93   5. Osteomyelitis of lumbar spine (CMS/HCC)  M46.26 730.28   6. Staphylococcus aureus bacteremia  R78.81 790.7    B95.61 041.11   7. Impaired mobility and ADLs  Z74.09 V49.89    Z78.9    8. Open wound  of right hip, subsequent encounter  S71.001D V58.89     890.0   9. Dysphagia, unspecified type  R13.10 787.20     Patient Active Problem List   Diagnosis   • Eustachian tube dysfunction   • Sensorineural hearing loss   • Lumbago of multiple sites in spine with sciatica   • Spondylolisthesis of lumbar region   • Coronary artery disease   • Hyperlipidemia   • Hypertension   • Myalgia due to statin   • S/P coronary artery stent placement   • Pacemaker   • Seropositive rheumatoid arthritis of multiple sites (CMS/[facility])   • Sick sinus syndrome (CMS/[facility])   • Other osteoporosis without current pathological fracture   • Allergic-infective asthma   • Arthritis of both knees   • Vasovagal syncope   • Bilateral bunions   • Bronchitis   • Cardiac pacemaker syndrome   • Charcot's joint of foot, left   • Constipation   • 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   • Intrinsic asthma   • Left carotid bruit   • Neutropenia (CMS/[facility])   • Obesity   • Primary osteoarthritis of left knee   • Psoriasis vulgaris   • Recurrent acute serous otitis media of both ears   • S/P lumbar laminectomy   • Thrombocytopenia (CMS/[facility])   • Hypothyroidism   • Vitamin D deficiency   • Myxedema heart disease   • Spondylolisthesis of lumbar region   • Syncope, cardiogenic   • Rupture of gluteus minimus tendon   • Muscle tear   • Syncope, recurrent   • Leukocytosis   • Headache   • Elevated CPK   • Staphylococcus aureus bacteremia   • Right hip pain, suspected infection   • Obesity (BMI 30-39.9)   • Stenosis of cervical spine with myelopathy (CMS/[facility])   • Spinal stenosis, lumbar region, with neurogenic claudication   • Discitis of lumbar region   • Osteomyelitis of lumbar spine (CMS/[facility])     Past Medical History:   Diagnosis Date   • Arthritis    • Asthma    • Chronic sinusitis    • Coronary artery disease    • Eustachian tube dysfunction    • Heart disease    • Herpes simplex    • Hyperlipidemia    • Hypertension    • Knee dislocation    • Labral tear of right hip  joint    • Laryngitis sicca    • Laryngitis, chronic    • MI (myocardial infarction) (CMS/HCC)    • Otorrhea    • Sensorineural hearing loss    • Sjogren's disease (CMS/HCC)      Past Surgical History:   Procedure Laterality Date   • A-V CARDIAC PACEMAKER INSERTION  2016   • ATRIAL CARDIAC PACEMAKER INSERTION     • CARDIAC CATHETERIZATION     • CATARACT EXTRACTION     • CERVICAL CORPECTOMY N/A 3/3/2021    Procedure: CERVICAL 6 CORPECTOMY WITH TITANIUM CAGE WITH NEURO MONITORING;  Surgeon: Bandar Shea MD;  Location:  [facility] OR;  Service: Neurosurgery;  Laterality: N/A;   • COLONOSCOPY  11/08/2011    One fold in the ascending colon which showed ulcer otherwise normal exam   • COLONOSCOPY  11/12/2004    Normal exam repeat in five years   • CORONARY ANGIOPLASTY WITH STENT PLACEMENT      X 2; 2013 & 2014   • ENDOSCOPY  07/10/2014    Normal exam   • HIP ABDUCTION TENOTOMY BILATERAL Right 1/14/2021    Procedure: RIGHT HIP GLUTEUS MEDLUS / MINIMUS REPAIR, POSSIBLE ACHILLES ALLOGRAFT;  Surgeon: Nino Carlson MD;  Location:  [facility] OR;  Service: Orthopedics;  Laterality: Right;   • INCISION AND DRAINAGE HIP Right 2/9/2021    Procedure: HIP INCISION AND DRAINAGE;  Surgeon: Nino Carlson MD;  Location:  [facility] OR;  Service: Orthopedics;  Laterality: Right;   • JOINT REPLACEMENT     • LUMBAR FUSION N/A 1/19/2018    Procedure: L3-4,L4-5 DECOMPRESSION, POSTERIOR SPINAL FUSION WITH INSTRUMENTATION;  Surgeon: Fortino Oropeza MD;  Location:  [facility] OR;  Service:    • LUMBAR LAMINECTOMY WITH FUSION Left 1/17/2018    Procedure: LEFT L3-4 L4-5 LATERAL LUMBAR INTERBODY FUSION;  Surgeon: Fortino Oropeza MD;  Location:  [facility] OR;  Service:    • MYRINGOTOMY W/ TUBES  09/04/2014    TUBES NO LONGER IN PLACE   • OTHER SURGICAL HISTORY      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 so hardware was removed and spacers were placed   • REPLACEMENT TOTAL KNEE Right         SWALLOW EVALUATION (last 72 hours)      SLP Adult Swallow Evaluation      Row Name 03/05/21 1011                   Rehab Evaluation    Document Type  evaluation  -MB        Subjective Information  complains of;pain  -MB        Patient Observations  alert;cooperative  -MB        Patient/Family/Caregiver Comments/Observations  Son and daughter-in-law present initially, but left at beginning of eval  -MB           General Information    Patient Profile Reviewed  yes  -MB        Pertinent History Of Current Problem  Recent labral tear of right hip joint with repair 01/14/21, subsquent infection with debridement/I&D of right hip wound and wound vac placement, MSSA, psoas abscess and possible L1-2 osteomyelitis discitis s/p cervical corpectomy with titanium cage and decompression. Hx of rheumatoid arthritis, CAD, HTN.   -MB        Current Method of Nutrition  regular textures;thin liquids  -MB        Precautions/Limitations, Vision  WFL  -MB        Precautions/Limitations, Hearing  WFL  -MB        Prior Level of Function-Communication  WFL  -MB        Prior Level of Function-Swallowing  no diet consistency restrictions  -MB        Plans/Goals Discussed with  patient  -MB        Barriers to Rehab  none identified  -MB        Patient's Goals for Discharge  patient did not state  -MB           Pain    Additional Documentation  Pain Scale: FACES Pre/Post-Treatment (Group)  -MB           Pain Scale: FACES Pre/Post-Treatment    Pain: FACES Scale, Pretreatment  0-->no hurt  -MB           Oral Motor Structure and Function    Dentition Assessment  natural, present and adequate  -MB        Secretion Management  WNL/WFL  -MB        Mucosal Quality  moist, healthy  -MB           Oral Musculature and Cranial Nerve Assessment    Oral Motor General Assessment  WFL  -MB           General Eating/Swallowing Observations    Respiratory Support Currently in Use  room air  -MB        Eating/Swallowing Skills  fed by SLP  -MB        Positioning During Eating  upright 90 degree;upright in chair  -MB        Utensils  Used  spoon;straw  -MB        Consistencies Trialed  regular textures;thin liquids;pudding thick  -MB           Clinical Swallow Eval    Oral Prep Phase  WFL  -MB        Oral Transit  WFL  -MB        Oral Residue  WFL  -MB        Pharyngeal Phase  no overt signs/symptoms of pharyngeal impairment  -MB        Esophageal Phase  unremarkable  -MB        Clinical Swallow Evaluation Summary  Regular solids, pudding thick, and thin consistencies were presented. Pt had audible swallows with thin. She complained of pain with swallowing at times and displayed increased facial grimacing with the regular solid. No overt s/s of aspiration were observed. Pt's reports that she had some difficulty taking meds with water and sips of plain water earlier, but that she was partially reclined at those time and realized that she needed to sit up straighter when eating/drinking.   -MB           Clinical Impression    SLP Swallowing Diagnosis  functional oral phase;R/O pharyngeal dysphagia  -MB        Functional Impact  no impact on function  -MB        Rehab Potential/Prognosis, Swallowing  good, to achieve stated therapy goals  -MB        Swallow Criteria for Skilled Therapeutic Interventions Met  demonstrates skilled criteria  -MB           Recommendations    Therapy Frequency (Swallow)  PRN  -MB        Predicted Duration Therapy Intervention (Days)  2 days  -MB        SLP Diet Recommendation  soft textures;ground;thin liquids  -MB        Recommended Precautions and Strategies  upright posture during/after eating;small bites of food and sips of liquid;alternate between small bites of food and sips of liquid  -MB        Oral Care Recommendations  Oral Care BID/PRN  -MB        SLP Rec. for Method of Medication Administration  meds whole;meds crushed;with pudding or applesauce  -MB        Monitor for Signs of Aspiration  yes;cough;gurgly voice;throat clearing;pneumonia;notify SLP if any concerns  -MB        Anticipated Discharge  Disposition (SLP)  unknown  -MB           Swallow Goals (SLP)    Oral Nutrition/Hydration Goal Selection (SLP)  oral nutrition/hydration, SLP goal 1  -MB           Oral Nutrition/Hydration Goal 1 (SLP)    Oral Nutrition/Hydration Goal 1, SLP  Pt will tolerate least restrictive diet with no overt s/s of aspiration.   -MB        Time Frame (Oral Nutrition/Hydration Goal 1, SLP)  by discharge  -MB        Barriers (Oral Nutrition/Hydration Goal 1, SLP)  n/a  -MB        Progress/Outcomes (Oral Nutrition/Hydration Goal 1, SLP)  goal ongoing  -MB          User Key  (r) = Recorded By, (t) = Taken By, (c) = Cosigned By    Initials Name Effective Dates    Dillon Ball, CCC-SLP 08/02/16 -           EDUCATION  The patient has been educated in the following areas:   Dysphagia (Swallowing Impairment).    SLP Recommendation and Plan  SLP Swallowing Diagnosis: functional oral phase, R/O pharyngeal dysphagia  SLP Diet Recommendation: soft textures, ground, thin liquids  Recommended Precautions and Strategies: upright posture during/after eating, small bites of food and sips of liquid, alternate between small bites of food and sips of liquid  SLP Rec. for Method of Medication Administration: meds whole, meds crushed, with pudding or applesauce     Monitor for Signs of Aspiration: yes, cough, gurgly voice, throat clearing, pneumonia, notify SLP if any concerns     Swallow Criteria for Skilled Therapeutic Interventions Met: demonstrates skilled criteria  Anticipated Discharge Disposition (SLP): unknown  Rehab Potential/Prognosis, Swallowing: good, to achieve stated therapy goals  Therapy Frequency (Swallow): PRN  Predicted Duration Therapy Intervention (Days): 2 days                         Plan of Care Reviewed With: patient  Outcome Summary: See note    SLP GOALS     Row Name 03/05/21 1011             Oral Nutrition/Hydration Goal 1 (SLP)    Oral Nutrition/Hydration Goal 1, SLP  Pt will tolerate least restrictive diet with  no overt s/s of aspiration.   -MB      Time Frame (Oral Nutrition/Hydration Goal 1, SLP)  by discharge  -MB      Barriers (Oral Nutrition/Hydration Goal 1, SLP)  n/a  -MB      Progress/Outcomes (Oral Nutrition/Hydration Goal 1, SLP)  goal ongoing  -MB        User Key  (r) = Recorded By, (t) = Taken By, (c) = Cosigned By    Initials Name Provider Type    Dillon Ball CCC-SLP Speech and Language Pathologist             Time Calculation:   Time Calculation- SLP     Row Name 03/05/21 1226             Time Calculation- SLP    SLP Start Time  1011  -MB      SLP Stop Time  1120  -MB      SLP Time Calculation (min)  69 min  -MB      SLP Received On  03/05/21  -MB      SLP Goal Re-Cert Due Date  03/15/21  -MB        User Key  (r) = Recorded By, (t) = Taken By, (c) = Cosigned By    Initials Name Provider Type    Dillon Ball CCC-SLP Speech and Language Pathologist          Therapy Charges for Today     Code Description Service Date Service Provider Modifiers Qty    [id number]  ST EVAL ORAL PHARYNG SWALLOW 5 3/5/2021 Dillon Pierson CCC-SLP GN 1               KEAVN Khan  3/5/2021

## 2021-03-05 NOTE — PLAN OF CARE
Goal Outcome Evaluation:     Progress: no change  Outcome Summary: Received from cardiac care unit via bed, mx family members at bedside. Neurologically intact, incision to neck zoila with dermabond, GUILLERMO intact, Aspen collar properly placed. Wound vac to right gluteal area properly functioning. Continue to montior.

## 2021-03-05 NOTE — PROGRESS NOTES
NEUROSURGERY DAILY PROGRESS NOTE    ASSESSMENT:   Tawny Shin is a 65 y.o. with a significant comorbidity rheumatoid arthritis on disease modifying agents, prior lumbar fusion by Dr. Oropeza in 2018.  She presents with a new problem of postop surgical infection of her right hip with wound VAC dressing, progressive right lower extremity proximal weakness without numbness or radiculopathy. Physical exam findings of right iliopsoas weakness, and global hyperreflexia with Babinski and right Patience's and decreased  strength bilaterally.  Their imaging shows CT of the cervical spine with severe cervical stenosis at severe stenosis C5-6 and C6/7 and adjacent segment disease at L1/2 and L2/3 with concern for osteomyelitis discitis.    Past Medical History:   Diagnosis Date   • Arthritis    • Asthma    • Chronic sinusitis    • Coronary artery disease    • Eustachian tube dysfunction    • Heart disease    • Herpes simplex    • Hyperlipidemia    • Hypertension    • Knee dislocation    • Labral tear of right hip joint    • Laryngitis sicca    • Laryngitis, chronic    • MI (myocardial infarction) (CMS/HCC)    • Otorrhea    • Sensorineural hearing loss    • Sjogren's disease (CMS/HCC)      Active Hospital Problems    Diagnosis   • Stenosis of cervical spine with myelopathy (CMS/HCC)     Added automatically from request for surgery [id number]     • Spinal stenosis, lumbar region, with neurogenic claudication     Added automatically from request for surgery [id number]     • Discitis of lumbar region     Added automatically from request for surgery [id number]     • Osteomyelitis of lumbar spine (CMS/HCC)     Added automatically from request for surgery [id number]       PLAN:   Neuro: Stable.  Improved  strength.  Improved right lower extremity strength     Cervical stenosis    POD #2 C6 corpectomy   GUILLERMO =30 mL, keep   Cervical collar in place.  Trachea midline.  No difficulty with swallowing   Postop x-rays stable   Continue  "neurochecks every 4 hours     Right psoas abscess   I&D right psoas abscess performed per IR (3/2/2021)   Body fluid culture: 4+ WBCs, rare 1+ gram-positive cocci   Light growth 2+ Staph aureus   Continue antibiotics per ID    Concern for discitis/osteomyelitis L1/2  L1/2 severe central canal stenosis   We will continue to monitor for now   May require L1 laminectomy with possible discectomy, foraminotomy, and debridement in the near future   Continue antibiotics per ID     CV: JADEN.  VS WNL  Pulm: No acute issues.  Maintaining O2 sat.  Continuous pulse oximetry   : Remove Horan ASAP.  Bladder scans and I/O cath per policy  FEN: Reports of intermittent choking with sips of water with medications.   Consult SLP  GI: Prophylaxis  ORTHO: Open wound right posterior hip.  MSSA infection, wound vac per PT   3/4/2021: Scant growth Staphylococcus aureus        Scant growth Enterococcus     ID: MSSA bacteria.  Continue cefazolin per ID.  Postop vancomycin  ORTHO: Heme:  DVT prophylaxis; SCD's  Pain: Tolerable at present, DC PCA and switch to oral meds   Dispo: PT/OT.       CHIEF COMPLAINT:   Right-sided lumbar back pain with a new complaint of right anterior thigh pain    Subjective  Symptom stable.  Temp:  [97.3 °F (36.3 °C)-97.9 °F (36.6 °C)] 97.9 °F (36.6 °C)  Heart Rate:  [65-80] 69  Resp:  [16-18] 16  BP: (105-170)/(55-92) 170/69  Arterial Line BP: (106-160)/(50-98) 140/73    Objective:  General Appearance:  Comfortable, well-appearing and in no acute distress.    Vital signs: (most recent): Blood pressure 170/69, pulse 69, temperature 97.9 °F (36.6 °C), temperature source Oral, resp. rate 16, height 165.1 cm (65\"), weight 106 kg (233 lb 12.8 oz), SpO2 95 %, not currently breastfeeding.      Neurologic Exam     Mental Status   Oriented to person, place, and time.   Attention: normal. Concentration: normal.   Speech: speech is normal   Level of consciousness: alert    Bright and awake.  Oriented x3.  Follows commands " without prompting showing thumbs up into fingers bilaterally.     Cranial Nerves     CN II   Visual fields full to confrontation.     CN III, IV, VI   Pupils are equal, round, and reactive to light.  Extraocular motions are normal.     CN V   Facial sensation intact.     CN VII   Facial expression full, symmetric.     CN VIII   CN VIII normal.     CN IX, X   CN IX normal.     CN XI   CN XI normal.     Motor Exam     Strength   Right deltoid: 5/5  Left deltoid: 5/5  Right biceps: 5/5  Left biceps: 5/5  Right triceps: 5/5  Left triceps: 5/5  Right wrist extension: 5/5  Left wrist extension: 5/5  Right iliopsoas: 2/5  Left iliopsoas: 5/5  Right quadriceps: 2/5  Left quadriceps: 5/5  Right anterior tibial: 3/5  Left anterior tibial: 5/5  Right gastroc: 4/5  Left gastroc: 5/5       Sensory Exam   Sensory deficit distribution on right: L1    Gait, Coordination, and Reflexes     Tremor   Resting tremor: absent  Intention tremor: absent  Action tremor: absent    Reflexes   Right plantar: upgoing  Left plantar: normal  Right Mims: present  Left Mims: absent  Right ankle clonus: present  Left ankle clonus: present  Right pendular knee jerk: absent  Left pendular knee jerk: absent    Drains: * No LDAs found *    Imaging Results (Last 24 Hours)     Procedure Component Value Units Date/Time    XR Spine Cervical 2 View [[id number]] Collected: 03/04/21 1747     Updated: 03/04/21 1751    Narrative:      EXAMINATION:   XR SPINE CERVICAL 2 VW-  3/4/2021 5:47 PM CST     HISTORY: Cervical spine, 3 views 3/4/2021 5:28 PM CST     HISTORY: C6 corpectomy     COMPARISON: None      FINDINGS:   Frontal, and lateral views of the cervical spine are provided. On the  lateral view, the spine is visualized through C7.      There is no evidence of fracture or subluxation. Postsurgical changes  present. Surgical plate present anteriorly with fixation screws at C5  and C7. Corpectomy device is present at C6. Surgical drain is present..  Faint  vascular calcifications noted in the left carotid artery.. There  is no evidence of facet lock or perch. The posterior elements are  intact.      The surrounding soft tissues are unremarkable.  The visualized lung  apices are clear.        Impression:      1. Status post C6 corpectomy. The cervical vertebra are normally  aligned.  2. Calcification left carotid artery.     This report was finalized on 03/04/2021 17:48 by Dr. Chai Francisco MD.        Lab Results (last 24 hours)     Procedure Component Value Units Date/Time    Basic Metabolic Panel [[id number]]  (Abnormal) Collected: 03/05/21 0436    Specimen: Blood Updated: 03/05/21 0510     Glucose 101 mg/dL      BUN 19 mg/dL      Creatinine 0.51 mg/dL      Sodium 140 mmol/L      Potassium 4.4 mmol/L      Chloride 106 mmol/L      CO2 27.0 mmol/L      Calcium 9.0 mg/dL      eGFR Non African Amer 120 mL/min/1.73      BUN/Creatinine Ratio 37.3     Anion Gap 7.0 mmol/L     Narrative:      GFR Normal >60  Chronic Kidney Disease <60  Kidney Failure <15      Vancomycin, Trough [[id number]]  (Normal) Collected: 03/05/21 0436    Specimen: Blood Updated: 03/05/21 0509     Vancomycin Trough 13.00 mcg/mL     C-reactive Protein [[id number]]  (Abnormal) Collected: 03/05/21 0436    Specimen: Blood Updated: 03/05/21 0509     C-Reactive Protein 6.03 mg/dL     CBC & Differential [[id number]]  (Abnormal) Collected: 03/05/21 0436    Specimen: Blood Updated: 03/05/21 0502    Narrative:      The following orders were created for panel order CBC & Differential.  Procedure                               Abnormality         Status                     ---------                               -----------         ------                     CBC Auto Differential[[id number]]        Abnormal            Final result                 Please view results for these tests on the individual orders.    CBC Auto Differential [[id number]]  (Abnormal) Collected: 03/05/21 0436    Specimen: Blood Updated: 03/05/21  0502     WBC 5.99 10*3/mm3      RBC 3.48 10*6/mm3      Hemoglobin 9.9 g/dL      Hematocrit 30.6 %      MCV 87.9 fL      MCH 28.4 pg      MCHC 32.4 g/dL      RDW 15.4 %      RDW-SD 49.1 fl      MPV 9.3 fL      Platelets 243 10*3/mm3      Neutrophil % 63.8 %      Lymphocyte % 22.0 %      Monocyte % 12.0 %      Eosinophil % 0.2 %      Basophil % 0.7 %      Immature Grans % 1.3 %      Neutrophils, Absolute 3.82 10*3/mm3      Lymphocytes, Absolute 1.32 10*3/mm3      Monocytes, Absolute 0.72 10*3/mm3      Eosinophils, Absolute 0.01 10*3/mm3      Basophils, Absolute 0.04 10*3/mm3      Immature Grans, Absolute 0.08 10*3/mm3      nRBC 0.0 /100 WBC     Sedimentation Rate [[id number]]  (Abnormal) Collected: 03/05/21 0436    Specimen: Blood Updated: 03/05/21 0501     Sed Rate 39 mm/hr     Body Fluid Culture - Body Fluid, Back, Lower [[id number]]  (Abnormal) Collected: 03/02/21 1515    Specimen: Body Fluid from Back, Lower Updated: 03/04/21 0750     Body Fluid Culture Light growth (2+) Staphylococcus aureus     Gram Stain Many (4+) WBCs seen      Rare (1+) Gram positive cocci    Wound Culture - Wound, Hip, Right [[id number]]  (Abnormal) Collected: 03/01/21 1001    Specimen: Wound from Hip, Right Updated: 03/04/21 0744     Wound Culture Scant growth (1+) Staphylococcus aureus      Scant growth (1+) Mixed Gram Negative Selina      Scant growth (1+) Enterococcus species      Scant growth (1+) Normal Skin Selina     Gram Stain Few (2+) WBCs seen      No organisms seen    Narrative:                Taiwo Prado, APRN

## 2021-03-05 NOTE — PROGRESS NOTES
"Pharmacy Dosing Service  Pharmacokinetics  Vancomycin Follow-up Evaluation    Assessment/Action/Plan:  Current Order: Vancomycin 1250 mg IVPB every 12 hours  Current end date:3/8/21  Levels: Tr = 13 today--Therapeutic  Additional antimicrobial agent(s): Cefazolin    Trough level therapeutic.  Renal function stable.  Continue current dose of Vancomycin 1250 mg iv q 12hrs. Pharmacy will continue to follow daily and adjust dose accordingly.     Subjective:  Tawny Shin is a 65 y.o. female currently on Vancomycin 1250 mg IV every 12 hours for the treatment of Bacteremia, SSTI, Surg PPX, day 3 of 5 of treatment.    AUC Model Data:  Regimen: 1250 mg every 12 hours  Exposure target: AUC24 (range)400-600 mg/L.hr  AUC24,ss: 528 mg/L.hr  PAUC*: 100 %  Ctrough,ss: 14.5 mg/L  Pconc*: 6 %  Tox.: 10 %      Objective:  Ht: 165.1 cm (65\"); Wt: 106 kg (233 lb 12.8 oz)  Estimated Creatinine Clearance: 82.5 mL/min (A) (by C-G formula based on SCr of 0.51 mg/dL (L)).   Creatinine   Date Value Ref Range Status   03/05/2021 0.51 (L) 0.57 - 1.00 mg/dL Final   03/04/2021 0.35 (L) 0.57 - 1.00 mg/dL Final   02/26/2021 0.46 (L) 0.57 - 1.00 mg/dL Final   07/08/2020 0.90 0.60 - 1.30 mg/dL Final     Comment:     Serial Number: [id number]:  [id number]   09/03/2019 0.7 0.5 - 0.9 mg/dL Final   01/14/2019 0.5 0.5 - 0.9 mg/dL Final   01/07/2019 0.6 0.5 - 0.9 mg/dL Final      Lab Results   Component Value Date    WBC 5.99 03/05/2021    WBC 3.46 03/04/2021    WBC 5.29 03/03/2021         Lab Results   Component Value Date    VANCOTROUGH 13.00 03/05/2021       Culture Results:  Microbiology Results (last 10 days)       Procedure Component Value - Date/Time    AFB Culture - Aspirate, Back, Lower [[id number]] Collected: 03/02/21 1515    Lab Status: Preliminary result Specimen: Aspirate from Back, Lower Updated: 03/05/21 0841     AFB Stain No acid fast bacilli seen on direct smear      No acid fast bacilli seen on concentrated smear    Body Fluid " Culture - Body Fluid, Back, Lower [[id number]]  (Abnormal) Collected: 03/02/21 1515    Lab Status: Final result Specimen: Body Fluid from Back, Lower Updated: 03/05/21 0842     Body Fluid Culture Light growth (2+) Staphylococcus aureus     Gram Stain Many (4+) WBCs seen      Rare (1+) Gram positive cocci    Narrative:      Refer to previous wound culture collected on 3/1/2021 1101 for [facility].      KOH Prep - Aspirate, Back, Lower [[id number]] Collected: 03/02/21 1515    Lab Status: Final result Specimen: Aspirate from Back, Lower Updated: 03/02/21 1838     KOH Prep No yeast or hyphal elements seen    COVID-19, [facility] IN-HOUSE,NASAL Swab (NO TRANSPORT MEDIA) 2 HR TAT - Swab, Nasopharynx [[id number]]  (Normal) Collected: 03/02/21 1100    Lab Status: Final result Specimen: Swab from Nasopharynx Updated: 03/02/21 1134     COVID19 Presumptive Negative    Narrative:      Fact sheet for providers: https://www.fda.gov/media/450088/download     Fact sheet for patients: https://www.fda.gov/media/019657/download    Test performed by PCR.  If inconsistent with clinical signs and symptoms patient should be tested with different authorized molecular test.    Wound Culture - Wound, Hip, Right [[id number]]  (Abnormal)  (Susceptibility) Collected: 03/01/21 1001    Lab Status: Final result Specimen: Wound from Hip, Right Updated: 03/05/21 0842     Wound Culture Scant growth (1+) Staphylococcus aureus      Scant growth (1+) Mixed Gram Negative Selina      Scant growth (1+) Enterococcus faecalis      Scant growth (1+) Normal Skin Selina     Gram Stain Few (2+) WBCs seen      No organisms seen    Narrative:            Susceptibility        Staphylococcus aureus     LICHA     Clindamycin Resistant     Erythromycin Resistant     Inducible Clindamycin Resistance Positive     Oxacillin Susceptible     Penicillin G Resistant     Rifampin Susceptible     Tetracycline Susceptible     Trimethoprim + Sulfamethoxazole Susceptible     Vancomycin Susceptible                   Susceptibility        Enterococcus faecalis     LICHA     Ampicillin Susceptible     Vancomycin Susceptible                  Susceptibility Comments       Staphylococcus aureus    This isolate is presumed to be clindamycin resistant based on detection of inducible clindamycin resistance.  Clindamycin may still be effective in some patients.  This isolate is presumed to be clindamycin resistant based on detection of inducible clindamycin resistance.  Clindamycin may still be effective in some patients.                       KARUNA Bee [facility]   03/05/21 08:44 CST

## 2021-03-05 NOTE — PLAN OF CARE
Problem: Adult Inpatient Plan of Care  Goal: Plan of Care Review  Plan of Care Reviewed With:   patient   son  Outcome Summary: Pt fatigued from having NPWT dressing changed this AM. She attempted to go to the restroom, but was unsuccessful to void. She still fatigues quickly and has difficulty with maintaining an upright posture.   Goal Outcome Evaluation:  Plan of Care Reviewed With: patient, son  Progress: no change  Outcome Summary: PT re-evaluation for care of the NPWT dressing. The wound cultures have come back and the wound is postive for staphylococcus aureas, mixed gram negative brett, and enterococcus species. She also has slough present in the wound bed so switching to using the instillation with normal saline was the better course of action. The wound  demonstrates healing with granulation tissue around and on the TFL muscle, however there is slough present deep and superficially in the wound. The wound does measure to be bigger in length and depth, which is partly due to her being more swollen in the area around the wound. Now that the patient is mobile after her last surgery, hopefully this will help with edema control. I sharp debrided the slough that I could reach comfortably for the patient and the instillation wound vac dressing was placed with a good seal. Duoderm was placed around the wound edges due to skin breakdown.  The infusion rate for the normal saline is 12mL every 3 hours with 96mL used every 24hrs. PT will continue to follow for dressing changes as needed and plan to change on Monday whether she is in a BHP bed or LTACH bed.

## 2021-03-05 NOTE — PROGRESS NOTES
Infectious Diseases Progress Note    Patient:  Tawny Shin  YOB: 1955  MRN: [id number]   Admit date: 2/12/2021   Admitting Physician: Bandar Shea, *  Primary Care Physician: Davon Urrutia MD    Chief Complaint/Interval History: She has some generalized achiness today.  Does not feel quite as good as yesterday.  Overall better than preoperatively.  No new localizing symptoms.  She is currently sitting at the side of her bed eating.  Family at bedside.    Intake/Output Summary (Last 24 hours) at 3/5/2021 0822  Last data filed at 3/5/2021 0648  Gross per 24 hour   Intake 950 ml   Output 1545 ml   Net -595 ml     Allergies:   Allergies   Allergen Reactions   • Atorvastatin Other (See Comments)     LEG CRAMPS     • Amoxicillin Rash   • Escitalopram Rash   • Nabumetone Rash   • Niacin Er Rash   • Penicillins Rash   • Simvastatin Rash     Current Scheduled Medications:   ascorbic acid, 500 mg, Oral, BID  carvedilol, 25 mg, Oral, BID With Meals  ceFAZolin, 2 g, Intravenous, Q8H  docusate sodium, 100 mg, Oral, BID  folic acid, 1 mg, Oral, Daily  gabapentin, 100 mg, Oral, Q12H  guaiFENesin, 600 mg, Oral, Q12H  heparin (porcine), 5,000 Units, Subcutaneous, Q12H  isosorbide mononitrate, 60 mg, Oral, BID - Nitrates  levothyroxine, 75 mcg, Oral, Q AM  multivitamin with minerals, 1 tablet, Oral, Daily  pantoprazole, 40 mg, Oral, Q AM  polyethylene glycol, 17 g, Oral, Daily  predniSONE, 5 mg, Oral, Daily With Breakfast  saccharomyces boulardii, 250 mg, Oral, BID  senna-docusate sodium, 2 tablet, Oral, BID  sodium chloride, 10 mL, Intravenous, Q12H  valACYclovir, 500 mg, Oral, Q24H  vancomycin, 1,250 mg, Intravenous, Q12H  zinc sulfate, 220 mg, Oral, Daily      Current PRN Medications:  •  acetaminophen **OR** acetaminophen  •  bisacodyl  •  budesonide  •  cloNIDine  •  cyclobenzaprine  •  diazePAM  •  labetalol  •  magnesium hydroxide  •  naloxone  •  nitroglycerin  •  ondansetron **OR**  "ondansetron  •  oxyCODONE-acetaminophen  •  oxyCODONE-acetaminophen  •  sodium chloride  •  sodium chloride    Review of Systems see HPI    Vital Signs:  /69 (BP Location: Left arm, Patient Position: Lying)   Pulse 72   Temp 98 °F (36.7 °C) (Oral)   Resp 18   Ht 165.1 cm (65\")   Wt 106 kg (233 lb 12.8 oz)   SpO2 95%   Breastfeeding No   BMI 38.91 kg/m²     Physical Exam  Vital signs - reviewed.  Line/IV site - No erythema, warmth, induration, or tenderness.  General alert, pleasant, no distress  Continues to appear stronger overall  Breathing comfortably  No new findings on exam    Lab Results:  CBC:   Results from last 7 days   Lab Units 03/05/21  0436 03/04/21  0315 03/03/21  0454 03/02/21  0331 03/01/21  1250 03/01/21  0459 02/28/21  0430 02/27/21  0437   WBC 10*3/mm3 5.99 3.46 5.29 6.10  --  6.52 5.38 6.36   HEMOGLOBIN g/dL 9.9* 9.5* 7.7* 7.8*  --  8.9* 8.7* 7.8*   HEMATOCRIT % 30.6* 29.1* 23.9* 24.8* 27.6* 27.6* 26.4* 24.5*   PLATELETS 10*3/mm3 243 221 254 223 251 251 286 272     BMP:  Results from last 7 days   Lab Units 03/05/21  0436 03/04/21  0315   SODIUM mmol/L 140 137   POTASSIUM mmol/L 4.4 4.4   CHLORIDE mmol/L 106 103   CO2 mmol/L 27.0 26.0   BUN mg/dL 19 11   CREATININE mg/dL 0.51* 0.35*   GLUCOSE mg/dL 101* 145*   CALCIUM mg/dL 9.0 8.9     Culture Results:   Wound Culture   Date Value Ref Range Status   03/01/2021 Scant growth (1+) Staphylococcus aureus (A)  Preliminary   03/01/2021 Scant growth (1+) Mixed Gram Negative Selina (A)  Preliminary   03/01/2021 Scant growth (1+) Enterococcus species (A)  Preliminary   03/01/2021 Scant growth (1+) Normal Skin Selina  Preliminary   Noticed the psoas abscess culture from March 2 in terms of susceptibility testing had been referred to the wound culture from March 1.  Contacted microbiology.  Explained the wound culture from March 1 with a surface culture and the psoas abscess culture from March 2 was a CT-guided aspirate.  Asked them to complete " susceptibility testing on the March 2 isolate.    Radiology: None  Additional Studies Reviewed: None    Impression:   1.  Methicillin susceptible staph aureus bacteremia  2.  Postoperative infection following the right hip gluteus muscle repair  3.  Right psoas abscess  4.  Discitis/osteomyelitis L1/L2  5.  Cervical stenosis  6.  Rheumatoid arthritis    Recommendations:   Continue cefazolin  No change in treatment at present  Await susceptibility from psoas aspirate  Continue to follow    Elie Ohara MD

## 2021-03-05 NOTE — PLAN OF CARE
Problem: Adult Inpatient Plan of Care  Goal: Plan of Care Review  Flowsheets (Taken 3/5/2021 3076)  Progress: no change  Plan of Care Reviewed With:   patient   daughter  Outcome Summary: Pt declined OOB at this time d/t pain level and fatigue from the day. Educated pt/daughter on c-collar management, removing pads, washing brace, showering precautions and cervical precautions during ADLs. Pt has difficulty grasping knob and straps, anticipate will need Max-DepA for brace don/doffing and care of brace. Brace was placed and adjusted to correct fit post tx, recommend further teaching to increase safety/awareness of precautions for ADL I. Recommend LTACH d/c and continued OT POC

## 2021-03-05 NOTE — THERAPY TREATMENT NOTE
Acute Care - Occupational Therapy Treatment Note  [facility]     Patient Name: Tawny Shin  : 1955  MRN: [id number]  Today's Date: 3/5/2021             Admit Date: 2021   Therapist utilized gait belt, applied non-slipped socks, provided fall risk education/prevention, & facilitated muscle strengthening PRN to reduce patient falls risk during this session.      ICD-10-CM ICD-9-CM   1. Stenosis of cervical spine with myelopathy (CMS/[facility])  M48.02 723.0    G99.2 336.3   2. Gait abnormality  R26.9 781.2   3. Spinal stenosis, lumbar region, with neurogenic claudication  M48.062 724.03   4. Discitis of lumbar region  M46.46 722.93   5. Osteomyelitis of lumbar spine (CMS/[facility])  M46.26 730.28   6. Staphylococcus aureus bacteremia  R78.81 790.7    B95.61 041.11   7. Impaired mobility and ADLs  Z74.09 V49.89    Z78.9    8. Open wound of right hip, subsequent encounter  S71.001D V58.89     890.0   9. Dysphagia, unspecified type  R13.10 787.20     Patient Active Problem List   Diagnosis   • Eustachian tube dysfunction   • Sensorineural hearing loss   • Lumbago of multiple sites in spine with sciatica   • Spondylolisthesis of lumbar region   • Coronary artery disease   • Hyperlipidemia   • Hypertension   • Myalgia due to statin   • S/P coronary artery stent placement   • Pacemaker   • Seropositive rheumatoid arthritis of multiple sites (CMS/[facility])   • Sick sinus syndrome (CMS/[facility])   • Other osteoporosis without current pathological fracture   • Allergic-infective asthma   • Arthritis of both knees   • Vasovagal syncope   • Bilateral bunions   • Bronchitis   • Cardiac pacemaker syndrome   • Charcot's joint of foot, left   • Constipation   • 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   • Intrinsic asthma   • Left carotid bruit   • Neutropenia (CMS/[facility])   • Obesity   • Primary osteoarthritis of left knee   • Psoriasis vulgaris   • Recurrent acute serous otitis media of both ears   • S/P lumbar laminectomy   • Thrombocytopenia  (CMS/[facility])   • Hypothyroidism   • Vitamin D deficiency   • Myxedema heart disease   • Spondylolisthesis of lumbar region   • Syncope, cardiogenic   • Rupture of gluteus minimus tendon   • Muscle tear   • Syncope, recurrent   • Leukocytosis   • Headache   • Elevated CPK   • Staphylococcus aureus bacteremia   • Right hip pain, suspected infection   • Obesity (BMI 30-39.9)   • Stenosis of cervical spine with myelopathy (CMS/[facility])   • Spinal stenosis, lumbar region, with neurogenic claudication   • Discitis of lumbar region   • Osteomyelitis of lumbar spine (CMS/[facility])     Past Medical History:   Diagnosis Date   • Arthritis    • Asthma    • Chronic sinusitis    • Coronary artery disease    • Eustachian tube dysfunction    • Heart disease    • Herpes simplex    • Hyperlipidemia    • Hypertension    • Knee dislocation    • Labral tear of right hip joint    • Laryngitis sicca    • Laryngitis, chronic    • MI (myocardial infarction) (CMS/[facility])    • Otorrhea    • Sensorineural hearing loss    • Sjogren's disease (CMS/[facility])      Past Surgical History:   Procedure Laterality Date   • A-V CARDIAC PACEMAKER INSERTION  2016   • ATRIAL CARDIAC PACEMAKER INSERTION     • CARDIAC CATHETERIZATION     • CATARACT EXTRACTION     • CERVICAL CORPECTOMY N/A 3/3/2021    Procedure: CERVICAL 6 CORPECTOMY WITH TITANIUM CAGE WITH NEURO MONITORING;  Surgeon: Bandar Shea MD;  Location:  [facility] OR;  Service: Neurosurgery;  Laterality: N/A;   • COLONOSCOPY  11/08/2011    One fold in the ascending colon which showed ulcer otherwise normal exam   • COLONOSCOPY  11/12/2004    Normal exam repeat in five years   • CORONARY ANGIOPLASTY WITH STENT PLACEMENT      X 2; 2013 & 2014   • ENDOSCOPY  07/10/2014    Normal exam   • HIP ABDUCTION TENOTOMY BILATERAL Right 1/14/2021    Procedure: RIGHT HIP GLUTEUS MEDLUS / MINIMUS REPAIR, POSSIBLE ACHILLES ALLOGRAFT;  Surgeon: Nino Carlson MD;  Location:  [facility] OR;  Service: Orthopedics;  Laterality: Right;   •  INCISION AND DRAINAGE HIP Right 2/9/2021    Procedure: HIP INCISION AND DRAINAGE;  Surgeon: Nino Carlson MD;  Location:  [facility] OR;  Service: Orthopedics;  Laterality: Right;   • JOINT REPLACEMENT     • LUMBAR FUSION N/A 1/19/2018    Procedure: L3-4,L4-5 DECOMPRESSION, POSTERIOR SPINAL FUSION WITH INSTRUMENTATION;  Surgeon: Fortino Oropeza MD;  Location:  [facility] OR;  Service:    • LUMBAR LAMINECTOMY WITH FUSION Left 1/17/2018    Procedure: LEFT L3-4 L4-5 LATERAL LUMBAR INTERBODY FUSION;  Surgeon: Fortino Oropeza MD;  Location:  [facility] OR;  Service:    • MYRINGOTOMY W/ TUBES  09/04/2014    TUBES NO LONGER IN PLACE   • OTHER SURGICAL HISTORY      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 so hardware was removed and spacers were placed   • REPLACEMENT TOTAL KNEE Right             OT ASSESSMENT FLOWSHEET (last 12 hours)      OT Evaluation and Treatment     Row Name 03/05/21 1541 03/05/21 1010                OT Time and Intention    Document Type  therapy note (daily note)  -MT  --       Mode of Treatment  occupational therapy  -MT  occupational therapy  -MT       Session Not Performed  --  patient unavailable for treatment  -MT       Comment, Session Not Performed  --  in wound care/with PT this am will follow for tx in pm   -MT       Symptoms Noted During/After Treatment  increased pain  -MT  --       Comment  Pt declined OOB at this time d/t pain level and fatigue from the day. Educated pt/daughter on c-collar management, removing pads, washing brace, showering precautions and cervical precautions during ADLs. Pt has difficulty grasping knob and straps, anticipate will need Max-DepA for brace don/doffing and care of brace. Brace was placed and adjusted to correct fit post tx, recommend further teaching to increase safety/awareness of precautions.  -MT  --          General Information    Existing Precautions/Restrictions  fall;spinal;other (see comments) c-collar all times, RLE WBAT, wound vac   -MT  --          Pain Scale:  Numbers Pre/Post-Treatment    Pretreatment Pain Rating  6/10  -MT  --       Posttreatment Pain Rating  6/10  -MT  --       Pain Location  neck  -MT  --       Pain Intervention(s)  Repositioned adjusted brace as she reported pain on R side by ear  -MT  --          Bed Mobility    Comment (Bed Mobility)  in fowlers   -MT  --          Wound 03/03/21 1446 Right anterior neck Incision    Wound - Properties Group Placement Date: 03/03/21  -HW Placement Time: 1446  -HW Present on Hospital Admission: N  -HW Side: Right  -TANIKA Orientation: anterior  -HW Location: neck  -HW Primary Wound Type: Incision  -HW    Retired Wound - Properties Group Date first assessed: 03/03/21  -HW Time first assessed: 1446  -HW Present on Hospital Admission: N  -HW Side: Right  -TANIKA Location: neck  -HW Primary Wound Type: Incision  -HW       Wound 02/09/21 1715 Right hip Incision    Wound - Properties Group Placement Date: 02/09/21  -SH Placement Time: 1715  -SH Present on Hospital Admission: Y  -SH Side: Right  -SH Location: hip  -SH Primary Wound Type: Incision  -SH    Retired Wound - Properties Group Date first assessed: 02/09/21  -SH Time first assessed: 1715  -SH Present on Hospital Admission: Y  -SH Side: Right  -SH Location: hip  -SH Primary Wound Type: Incision  -SH       Wound 03/03/21 2015 Right lateral greater trochanter Skin Tear    Wound - Properties Group Placement Date: 03/03/21  -SM Placement Time: 2015 -SM Side: Right  -SM Orientation: lateral  -SM Location: greater trochanter  -SM Primary Wound Type: Skin tear  -SM Additional Comments: arrived to unit w/ skin tear, open to air  -SM    Retired Wound - Properties Group Date first assessed: 03/03/21  -SM Time first assessed: 2015 -SM Side: Right  -SM Location: greater trochanter  -SM Primary Wound Type: Skin tear  -SM Additional Comments: arrived to unit w/ skin tear, open to air  -SM       NPWT (Negative Pressure Wound Therapy) 02/09/21 1741 right hip    NPWT (Negative Pressure  Wound Therapy) - Properties Group Placement Date: 02/09/21  - Placement Time: 1741 -SH Location: right hip  -SH Additional Comments: 1 pc of black sponge  -SH    Retired NPWT (Negative Pressure Wound Therapy) - Properties Group Placement Date: 02/09/21  - Placement Time: 1741 -SH Location: right hip  -SH Additional Comments: 1 pc of black sponge  -SH       Plan of Care Review    Plan of Care Reviewed With  patient;daughter  -MT  --       Progress  no change  -MT  --          Positioning and Restraints    Pre-Treatment Position  in bed  -MT  --       Post Treatment Position  bed  -MT  --       In Bed  fowlers;call light within reach;encouraged to call for assist;exit alarm on;with family/caregiver;side rails up x2;with brace  -MT  --          Therapy Plan Review/Discharge Plan (OT)    Anticipated Discharge Disposition (OT)  [facility]  -MT  --         User Key  (r) = Recorded By, (t) = Taken By, (c) = Cosigned By    Initials Name Effective Dates    TANIKA Ewelina Dean RN 08/02/16 -     MT Mirian Hanks COTA/L 11/29/19 -      Laurie Herrera RN 01/02/19 -      Benjie Motta RN 06/05/20 -      Brenna Ortega RN 10/01/20 -          Occupational Therapy Education                 Title: PT OT SLP Therapies (In Progress)     Topic: Occupational Therapy (In Progress)     Point: ADL training (Done)     Description:   Instruct learner(s) on proper safety adaptation and remediation techniques during self care or transfers.   Instruct in proper use of assistive devices.              Learning Progress Summary           Patient Acceptance, E, VU by QUANG at 3/4/2021 1128                   Point: Home exercise program (Not Started)     Description:   Instruct learner(s) on appropriate technique for monitoring, assisting and/or progressing therapeutic exercises/activities.              Learner Progress:  Not documented in this visit.          Point: Precautions (Done)     Description:    Instruct learner(s) on prescribed precautions during self-care and functional transfers.              Learning Progress Summary           Patient Acceptance, E, VU by MARKIE at 3/4/2021 1128                   Point: Body mechanics (Done)     Description:   Instruct learner(s) on proper positioning and spine alignment during self-care, functional mobility activities and/or exercises.              Learning Progress Summary           Patient Acceptance, E, VU by QUANG at 3/4/2021 1128                               User Key     Initials Effective Dates Name Provider Type Discipline    MARKIE 12/04/20 -  [facility] OTR/L Occupational Therapist OT                  OT Recommendation and Plan     Plan of Care Review  Plan of Care Reviewed With: patient, daughter  Progress: no change  Outcome Summary: Pt declined OOB at this time d/t pain level and fatigue from the day. Educated pt/daughter on c-collar management, removing pads, washing brace, showering precautions and cervical precautions during ADLs. Pt has difficulty grasping knob and straps, anticipate will need Max-DepA for brace don/doffing and care of brace. Brace was placed and adjusted to correct fit post tx, recommend further teaching to increase safety/awareness of precautions for increased ADL I. Recommend LTACH d/c and continued OT POC    Outcome Measures     Row Name 03/05/21 1541             How much help from another is currently needed...    Putting on and taking off regular lower body clothing?  2  -MT      Bathing (including washing, rinsing, and drying)  2  -MT      Toileting (which includes using toilet bed pan or urinal)  2  -MT      Putting on and taking off regular upper body clothing  3  -MT      Taking care of personal grooming (such as brushing teeth)  4  -MT      Eating meals  4  -MT      AM-PAC 6 Clicks Score (OT)  17  -MT        User Key  (r) = Recorded By, (t) = Taken By, (c) = Cosigned By    Initials Name Provider Type    MT Mirian Hanks  GALLITO/L Occupational Therapy Assistant          Time Calculation:   Time Calculation- OT     Row Name 03/05/21 1541             Time Calculation- OT    OT Start Time  1541  -MT      OT Stop Time  1605  -MT      OT Time Calculation (min)  24 min  -MT      Total Timed Code Minutes- OT  24 minute(s)  -MT      OT Received On  03/05/21  -MT         Timed Charges    54336 - OT Self Care/Mgmt Minutes  24  -MT        User Key  (r) = Recorded By, (t) = Taken By, (c) = Cosigned By    Initials Name Provider Type    MT Mirian Hanks COTA/L Occupational Therapy Assistant        Therapy Charges for Today     Code Description Service Date Service Provider Modifiers Qty    [id number] HC OT SELF CARE/MGMT/TRAIN EA 15 MIN 3/5/2021 Mirian Hanks COTA/L GO 2               GUALBERTO Pappas  3/5/2021

## 2021-03-05 NOTE — PLAN OF CARE
Goal Outcome Evaluation:         A&Ox4.  VSS.  Room air, .  SCD.  Tele in place, NS.  Pain controlled with ordered PO pain meds.  Wound vac in place.  Plan is for wound vac to be changed today.  Up to bs commode.  Aspen collar in place.  Will continue to monitor.

## 2021-03-05 NOTE — DISCHARGE SUMMARY
DISCHARGE SUMMARY FROM HOSPITAL    Date of Discharge:  3/6/2021    Presenting Diagnosis/History of Present Illness  Stenosis of cervical spine with myelopathy (CMS/HCC) [M48.02, G99.2]    Medical History   Patient Active Problem List   Diagnosis   • Eustachian tube dysfunction   • Sensorineural hearing loss   • Lumbago of multiple sites in spine with sciatica   • Spondylolisthesis of lumbar region   • Coronary artery disease   • Hyperlipidemia   • Hypertension   • Myalgia due to statin   • S/P coronary artery stent placement   • Pacemaker   • Seropositive rheumatoid arthritis of multiple sites (CMS/HCC)   • Sick sinus syndrome (CMS/Formerly [facility])   • Other osteoporosis without current pathological fracture   • Allergic-infective asthma   • Arthritis of both knees   • Vasovagal syncope   • Bilateral bunions   • Bronchitis   • Cardiac pacemaker syndrome   • Charcot's joint of foot, left   • Constipation   • 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   • Intrinsic asthma   • Left carotid bruit   • Neutropenia (CMS/HCC)   • Obesity   • Primary osteoarthritis of left knee   • Psoriasis vulgaris   • Recurrent acute serous otitis media of both ears   • S/P lumbar laminectomy   • Thrombocytopenia (CMS/Formerly [facility])   • Hypothyroidism   • Vitamin D deficiency   • Myxedema heart disease   • Spondylolisthesis of lumbar region   • Syncope, cardiogenic   • Rupture of gluteus minimus tendon   • Muscle tear   • Syncope, recurrent   • Leukocytosis   • Headache   • Elevated CPK   • Staphylococcus aureus bacteremia   • Right hip pain, suspected infection   • Obesity (BMI 30-39.9)   • Stenosis of cervical spine with myelopathy (CMS/HCC)   • Spinal stenosis, lumbar region, with neurogenic claudication   • Discitis of lumbar region   • Osteomyelitis of lumbar spine (CMS/Formerly [facility])     Discharge Diagnosis/ Active Hospital Problems:   Active Hospital Problems    Diagnosis    • Stenosis of cervical spine with myelopathy (CMS/Former[facility])      Added automatically from request for  surgery [id number]     • Spinal stenosis, lumbar region, with neurogenic claudication      Added automatically from request for surgery [id number]     • Discitis of lumbar region      Added automatically from request for surgery [id number]     • Osteomyelitis of lumbar spine (CMS/HCC)      Added automatically from request for surgery [id number]       Hospital Course  Patient is a 65 y.o. female presented with a significant medical history of rheumatoid arthritis on disease modifying agents, prior lumbar fusion by Dr. Oropeza in 2018.  She presents with a new problem of postop surgical infection of her right hip with wound VAC dressing, progressive right lower extremity proximal weakness without numbness or radiculopathy. Physical exam findings of right iliopsoas weakness, and global hyperreflexia with Babinski and right Patience's and decreased  strength bilaterally.  Their imaging shows CT of the cervical spine with severe cervical stenosis at severe stenosis C5-6 and C6/7 and adjacent segment disease at L1/2 and L2/3 with concern for osteomyelitis discitis.      On 3/4/2021 the patient was brought to the main operating room where she underwent an uneventful C6 corpectomy per Dr. Shea.  Postoperatively she did extremely well and her neurological exam improved.  She was kept  in the hospital for close neurologic monitoring, airway observation, and for pain control.  She has been able to tolerate oral diet, oral pain medication, and void.  She has been evaluated by physical therapy and Occupational Therapy and deemed safe for discharge for transfer [facility].  Additional instructions provided.    Procedures Performed  Procedure(s):  CERVICAL 6 CORPECTOMY WITH TITANIUM CAGE WITH NEURO MONITORING       Consults:   Consults     Date and Time Order Name Status Description    2/23/2021 11:53 AM Inpatient Neurosurgery Consult Completed     2/12/2021  4:31 PM Inpatient Infectious Diseases Consult Completed      2/9/2021  6:38 AM Inpatient Orthopedic Surgery Consult Completed           Pertinent Test Results:   CT Head Without Contrast    Result Date: 2/8/2021  1. No CT evidence of an acute intracranial process. 2. Extensive paranasal sinus disease.    This report was finalized on 02/08/2021 21:38 by Dr. Sterling Elizalde MD.    CT Cervical Spine Without Contrast    Result Date: 2/8/2021  1. No CT evidence of acute bony injury to the cervical spine. 2. Advanced disc degeneration and spondylosis C5-C6 and to a lesser degree C6-C7 with resulting canal and foraminal stenosis. This report was finalized on 02/08/2021 21:50 by Dr. Sterling Elizalde MD.    CT Thoracic Spine Without Contrast    Result Date: 2/8/2021  1. No CT evidence of acute bony injury to the thoracic spine. This report was finalized on 02/08/2021 22:26 by Dr. Sterling Elizalde MD.    CT Lumbar Spine Without Contrast    Result Date: 2/8/2021  1. No CT evidence of acute bony injury to the lumbar spine. 2. Extensive postsurgical changes. 3. Advanced disc degeneration and spondylosis at L2-L3 and L1-L2. This report was finalized on 02/08/2021 22:36 by Dr. Sterling Elizalde MD.    MRI Cervical Spine With & Without Contrast    Result Date: 2/24/2021  1. Large central and rightward disc causing severe central canal stenosis and occluding the right neural foramen at the C5-6 level. 2. Central and leftward disc flattening the cord and occluding the left neural foramen at the C6-7 level.   This report was finalized on 02/24/2021 15:37 by Dr. Chai Francisco MD.    CT Abdomen Pelvis With Contrast    Addendum Date: 2/26/2021    ADDENDUM  IMPRESSION should also include: 5. Thickening of the distal esophagus, which can be seen with reflux. Correlate with symptoms and consider direct visualization when clinically appropriate.  END ADDENDUM This report was finalized on 02/26/2021 11:43 by Dr Paulina Tavares MD.    Result Date: 2/26/2021  1. Linear gas tracking along the right lateral hip soft  tissues. No discrete drainable fluid collection. Gas is closely adjacent to the right greater trochanter, but not definitely touching it. No convincing erosive bony changes at this time. No large hip joint effusion. 2. Density in the dependent aspect of the urinary bladder, could represent early excretion of contrast, blood products or mass. Consider follow-up ultrasound in 2-4 weeks to evaluate for resolution. 3. Gallstone. 4. Coronary artery calcifications. This report was finalized on 02/26/2021 09:14 by Dr Paulina Tavares MD.    XR Chest 1 View    Result Date: 2/8/2021  1. No acute cardiopulmonary process.  This report was finalized on 02/08/2021 22:03 by Dr. Sterling Elizalde MD.    FL C Arm During Surgery    Result Date: 3/3/2021   Intraoperative fluoroscopic images during anterior fusion and C6 cervical corpectomy.  Please refer to the operative note for more details. This report was finalized on 03/03/2021 18:45 by Dr. Chai Francisco MD.    CT Angiogram Chest    Result Date: 2/8/2021  1. No CT angiographic evidence of pulmonary embolus or acute aortic pathology. 2. Mild reticular groundglass reticular opacities in the right upper lobe, etiology uncertain. Differential considerations includes mild infectious/inflammatory changes. Mild pulmonary contusion as well as chronic interstitial lung changes considered. Probable mild atelectasis in the lower lobes. Lung fields are otherwise clear. 3. Mild deformity right anterior fourth rib, question old trauma. Correlate with pedicle findings. No definite acute osseous abnormalities observed otherwise. This report was finalized on 02/08/2021 22:01 by Dr. Sterling Elizalde MD.    CT Guided Biopsy Aspiration Injection    Result Date: 3/2/2021  1.. The right psoas abscess has shown diminishment in size from the previous CT exam. The upper margin of the collection is no longer well demonstrated and there is some mild overall diminishment in size of the right psoas muscle in  comparison with the left. Given the clinical situation in which the patient is to undergo surgery it was elected to attempt aspiration with removal of as much of the material as possible. I was subsequently able to remove proximally 20 cc of frankly purulent material from the right psoas muscle collection. This was sent to the laboratory for further evaluation as requested. There were no immediate complications. This report was finalized on 03/02/2021 14:46 by Dr. Neymar Calderon MD.    CT Lower Extremity Right Without Contrast    Result Date: 2/9/2021  1. There is stranding of the subcutaneous tissues of the lateral right hip with no organized abscess collection. 2. Hardware removal suspected from the proximal right femur with no acute osseous pathology. This report was finalized on 02/09/2021 08:49 by Dr. Trinity Gómez MD.    XR Abdomen KUB    Result Date: 2/21/2021  1.  No acute abdominopelvic abnormalities. This report was finalized on 02/21/2021 18:05 by Dr. Angela Torres MD.    XR Spine Cervical 2 View    Result Date: 3/4/2021  1. Status post C6 corpectomy. The cervical vertebra are normally aligned. 2. Calcification left carotid artery.  This report was finalized on 03/04/2021 17:48 by Dr. Chai Francisco MD.    XR Spine Cervical 2 View    Result Date: 3/3/2021   Intraoperative fluoroscopic images during anterior fusion and C6 cervical corpectomy.  Please refer to the operative note for more details. This report was finalized on 03/03/2021 18:45 by Dr. Chai Francisco MD.    MRI Lumbar Spine With & Without Contrast    Result Date: 2/28/2021  1. Similar right psaos abscess and compared to 2/22/2021. 2. Discitis/possible osteomyelitis at the L1/L2 level. No epidural abscess identified. 3. Severe canal stenosis at the L1/L2 level primarily due to a large central disc protrusion. Please refer to dictation above for detailed findings at each level.  This report was finalized on 02/28/2021 13:47 by Dr. Petersen  MD Rcio.    MRI Lumbar Spine With & Without Contrast    Result Date: 2/22/2021  1.  Discitis osteomyelitis at L1-L2. 2.  Right psoas abscess. 3.  Severe thecal sac stenosis at L1-L2. This report was finalized on 02/22/2021 17:14 by Dr. Angela Torres MD.    XR Hip With or Without Pelvis 2 - 3 View Right    Result Date: 2/8/2021  1. No acute osseous abnormality. This report was finalized on 02/08/2021 22:55 by Dr. Sterling Elizalde MD.      CBC:   Results from last 7 days   Lab Units 03/05/21  0436 03/04/21  0315 03/03/21  0454 03/02/21  0331 03/01/21  1250 03/01/21  0459 02/28/21  0430   WBC 10*3/mm3 5.99 3.46 5.29 6.10  --  6.52 5.38   HEMOGLOBIN g/dL 9.9* 9.5* 7.7* 7.8*  --  8.9* 8.7*   HEMATOCRIT % 30.6* 29.1* 23.9* 24.8* 27.6* 27.6* 26.4*   PLATELETS 10*3/mm3 243 221 254 223 251 251 286     BMP:  Results from last 7 days   Lab Units 03/05/21 0436 03/04/21  0315   SODIUM mmol/L 140 137   POTASSIUM mmol/L 4.4 4.4   CHLORIDE mmol/L 106 103   CO2 mmol/L 27.0 26.0   BUN mg/dL 19 11   CREATININE mg/dL 0.51* 0.35*   GLUCOSE mg/dL 101* 145*   CALCIUM mg/dL 9.0 8.9     Culture Results:   Wound Culture   Date Value Ref Range Status   03/01/2021 Scant growth (1+) Staphylococcus aureus (A)  Final   03/01/2021 Scant growth (1+) Mixed Gram Negative Selina (A)  Final   03/01/2021 Scant growth (1+) Enterococcus faecalis (A)  Final   03/01/2021 Scant growth (1+) Normal Skin Selina  Final     Condition on Discharge:  Stable    Vital Signs  Temp:  [98 °F (36.7 °C)-99.2 °F (37.3 °C)] 99 °F (37.2 °C)  Heart Rate:  [74-91] 83  Resp:  [18] 18  BP: (136-175)/(53-76) 153/58    Physical Exam:   Physical Exam  Vitals signs and nursing note reviewed.   Constitutional:       General: She is not in acute distress.     Appearance: Normal appearance. She is well-developed and well-groomed. She is obese. She is not ill-appearing, toxic-appearing or diaphoretic.          Comments: BMI 38.91   HENT:      Head: Normocephalic and atraumatic.       Right Ear: Hearing normal.      Left Ear: Hearing normal.   Eyes:      Extraocular Movements: EOM normal.      Conjunctiva/sclera: Conjunctivae normal.      Pupils: Pupils are equal, round, and reactive to light.   Neck:      Musculoskeletal: Full passive range of motion without pain and neck supple.      Trachea: Trachea normal.     Cardiovascular:      Rate and Rhythm: Normal rate and regular rhythm.   Pulmonary:      Effort: Pulmonary effort is normal. No tachypnea, bradypnea, accessory muscle usage or respiratory distress.   Abdominal:      Palpations: Abdomen is soft.   Skin:     General: Skin is warm and dry.   Neurological:      Mental Status: She is alert and oriented to person, place, and time.      GCS: GCS eye subscore is 4. GCS verbal subscore is 5. GCS motor subscore is 6.   Psychiatric:         Speech: Speech normal.         Behavior: Behavior normal. Behavior is cooperative.          Neurologic Exam     Mental Status   Oriented to person, place, and time.   Attention: normal. Concentration: normal.   Speech: speech is normal   Level of consciousness: alert    Bright and awake.  Oriented x3.  Follows commands without prompting showing thumbs up into fingers bilaterally.     Cranial Nerves     CN II   Visual fields full to confrontation.     CN III, IV, VI   Pupils are equal, round, and reactive to light.  Extraocular motions are normal.     CN V   Facial sensation intact.     CN VII   Facial expression full, symmetric.     CN VIII   CN VIII normal.     CN IX, X   CN IX normal.     CN XI   CN XI normal.     Motor Exam     Strength   Right deltoid: 5/5  Left deltoid: 5/5  Right biceps: 5/5  Left biceps: 5/5  Right triceps: 5/5  Left triceps: 5/5  Right wrist extension: 5/5  Left wrist extension: 5/5  Right iliopsoas: 2/5  Left iliopsoas: 5/5  Right quadriceps: 2/5  Left quadriceps: 5/5  Right anterior tibial: 3/5  Left anterior tibial: 5/5  Right gastroc: 4/5  Left gastroc: 5/5       Sensory Exam    Sensory deficit distribution on right: L1    Gait, Coordination, and Reflexes     Tremor   Resting tremor: absent  Intention tremor: absent  Action tremor: absent    Reflexes   Right plantar: upgoing  Left plantar: normal  Right Mims: present  Left Mims: absent  Right ankle clonus: present  Left ankle clonus: present  Right pendular knee jerk: absent  Left pendular knee jerk: absent    Discharge Disposition  Long Term Care (DC - External)    Discharge Medications     Discharge Medications      New Medications      Instructions Start Date   acetaminophen 325 MG tablet  Commonly known as: TYLENOL   650 mg, Oral, Every 4 Hours PRN      ascorbic acid 500 MG tablet  Commonly known as: VITAMIN C   500 mg, Oral, 2 Times Daily      bisacodyl 10 MG suppository  Commonly known as: DULCOLAX   10 mg, Rectal, Daily PRN      budesonide 0.5 MG/2ML nebulizer solution  Commonly known as: PULMICORT   0.5 mg, Nebulization, 2 Times Daily PRN      cloNIDine 0.1 MG tablet  Commonly known as: CATAPRES   0.1 mg, Oral, 2 Times Daily PRN      cyclobenzaprine 10 MG tablet  Commonly known as: FLEXERIL   10 mg, Oral, 3 Times Daily PRN      diazePAM 2 MG tablet  Commonly known as: VALIUM   2 mg, Oral, Every 6 Hours PRN      docusate sodium 100 MG capsule   100 mg, Oral, 2 Times Daily      gabapentin 100 MG capsule  Commonly known as: NEURONTIN   100 mg, Oral, Every 12 Hours Scheduled      heparin (porcine) 5000 UNIT/ML injection   5,000 Units, Subcutaneous, Every 12 Hours Scheduled      labetalol 5 MG/ML injection  Commonly known as: NORMODYNE,TRANDATE   20 mg, Intravenous, Every 10 Minutes PRN      multivitamin with minerals tablet tablet   1 tablet, Oral, Daily      naloxone 0.4 MG/ML injection  Commonly known as: NARCAN   0.1 mg, Intravenous, Every 5 Minutes PRN      niCARdipine  Commonly known as: CARDENE   5-15 mg/hr (5-15 mg/hr), Intravenous, Titrated      oxyCODONE-acetaminophen  MG per tablet  Commonly known as: PERCOCET    1 tablet, Oral, Every 4 Hours PRN      oxyCODONE-acetaminophen 7.5-325 MG per tablet  Commonly known as: PERCOCET   1 tablet, Oral, Every 4 Hours PRN      polyethylene glycol 17 g packet  Commonly known as: MIRALAX   17 g, Oral, Daily      saccharomyces boulardii 250 MG capsule  Commonly known as: FLORASTOR   250 mg, Oral, 2 Times Daily      sennosides-docusate 8.6-50 MG per tablet  Commonly known as: PERICOLACE   2 tablets, Oral, 2 Times Daily      sodium chloride 0.9 % with KCl 20 mEq 20-0.9 MEQ/L-% infusion   100 mL/hr (100 mL/hr), Intravenous, Continuous      valACYclovir 500 MG tablet  Commonly known as: VALTREX   500 mg, Oral, Every 24 Hours Scheduled      vancomycin   1,250 mg, Intravenous, Every 12 Hours      zinc sulfate 220 (50 Zn) MG capsule  Commonly known as: ZINCATE   220 mg, Oral, Daily         Continue These Medications      Instructions Start Date   carvedilol 12.5 MG tablet  Commonly known as: COREG   12.5 mg, Oral, 2 Times Daily With Meals      ceFAZolin in 0.9% normal saline 2 GM/ 100 mL solution IVPB  Commonly known as: ANCEF   2 g, Intravenous, Every 8 Hours      folic acid 1 MG tablet  Commonly known as: FOLVITE   TAKE 1 TABLET BY MOUTH DAILY      furosemide 40 MG tablet  Commonly known as: LASIX   40 mg, Oral, Daily      guaiFENesin 600 MG 12 hr tablet  Commonly known as: MUCINEX   Oral      isosorbide mononitrate 60 MG 24 hr tablet  Commonly known as: IMDUR   60 mg, Oral, 2 Times Daily      levothyroxine 75 MCG tablet  Commonly known as: SYNTHROID, LEVOTHROID   Daily      Nitrostat 0.4 MG SL tablet  Generic drug: nitroglycerin   Place 1 tablet under the tongue every 5 minutes as needed for Chest pain      ondansetron 4 MG tablet  Commonly known as: ZOFRAN   4 mg, Oral, Every 6 Hours PRN      pantoprazole 40 MG EC tablet  Commonly known as: PROTONIX   TAKE 1 TABLET BY MOUTH DAILY      predniSONE 1 MG tablet  Commonly known as: DELTASONE   2 mg, Oral, Daily      vitamin D 1.25 MG (79122 UT)  capsule capsule  Commonly known as: ERGOCALCIFEROL  Notes to patient: As per home schedule   50,000 Units, Oral, Every 7 Days         Stop These Medications    aspirin 81 MG EC tablet     clopidogrel 75 MG tablet  Commonly known as: PLAVIX     HYDROcodone-acetaminophen 7.5-325 MG per tablet  Commonly known as: NORCO     HYDROmorphone 1 MG/ML solution injection  Commonly known as: DILAUDID     morphine 4 MG/ML injection     multivitamin tablet tablet  Commonly known as: THERAGRAN     salsalate 500 MG tablet  Commonly known as: DISALCID     traMADol 50 MG tablet  Commonly known as: ULTRAM            Discharge Diet:   Diet Instructions     Advance Diet As Tolerated      Advance diet per SLP           Activity at Discharge:   Activity Instructions     Activity as Tolerated      Bathing Restrictions      May shower 72 hours postoperatively.  No tub baths.  Showers only.  Allow clean soapy water to cleanse wound.  Do not scrub incision.    Type of Restriction: Bathing    Bathing Restrictions: No Tub Bath    Driving Restrictions      NO DRIVING WHILE WEARING CERVICAL COLLAR    Type of Restriction: Driving    Driving Restrictions: No Driving While Taking Narcotics    Gradually Increase Activity Until at Pre-Hospitalization Level      Lifting Restrictions      Type of Restriction: Lifting    Lifting Restrictions: Lifting Restriction (Indicate Limit)    Weight Limit (Pounds): 8    Length of Lifting Restriction: 2-3 WEEKS           Follow-up Appointments  Future Appointments   Date Time Provider Department Center   3/15/2021 10:00 AM PAD BIC DEXA 1 BH PAD DX BI PAD   6/28/2021 10:00 AM Davon Urrutia MD [facility]Q PAD   7/7/2021  9:15 AM [facility]   1/13/2022 11:15 AM [facility]     Additional Instructions for the Follow-ups that You Need to Schedule     Call MD With Problems / Concerns   As directed      Instructions: Call for worsening  pain or a concern for a postoperative incision infection (increased incisional redness, swelling, warmth, or drainage/discharge).    Order Comments: Instructions: Call for worsening pain or a concern for a postoperative incision infection (increased incisional redness, swelling, warmth, or drainage/discharge).          Discharge Follow-up with Specified Provider: Dr. Shea; 6 Weeks   As directed      To: Dr. Shea    Follow Up: 6 Weeks    Follow Up Details: 6-8 WEEKS WITH XRAYS OF THE CERVICAL SPINE         Discharge Follow-up with Specified Provider: DANIELA Murphy; 2 Weeks   As directed      To: DANIELA Murphy    Follow Up: 2 Weeks    Follow Up Details: Postop wound check             Test Results Pending at Discharge  Pending Labs     Order Current Status    Anaerobic Culture - Aspirate, Back, Lower In process    Fungus Culture - Aspirate, Back, Lower In process    AFB Culture - Aspirate, Back, Lower Preliminary result        Bandar Shea MD  03/06/21  11:01 [facility]    [id number]

## 2021-03-06 ENCOUNTER — HOSPITAL ENCOUNTER (OUTPATIENT)
Facility: HOSPITAL | Age: 66
End: 2021-03-22
Attending: INTERNAL MEDICINE | Admitting: INTERNAL MEDICINE

## 2021-03-06 VITALS
SYSTOLIC BLOOD PRESSURE: 153 MMHG | DIASTOLIC BLOOD PRESSURE: 58 MMHG | HEIGHT: 65 IN | RESPIRATION RATE: 18 BRPM | WEIGHT: 233.8 LBS | OXYGEN SATURATION: 95 % | BODY MASS INDEX: 38.95 KG/M2 | TEMPERATURE: 99 F | HEART RATE: 83 BPM

## 2021-03-06 PROCEDURE — 25010000002 VANCOMYCIN: Performed by: INTERNAL MEDICINE

## 2021-03-06 PROCEDURE — 25010000002 HEPARIN (PORCINE) PER 1000 UNITS: Performed by: INTERNAL MEDICINE

## 2021-03-06 PROCEDURE — 25010000002 VANCOMYCIN 10 G RECONSTITUTED SOLUTION: Performed by: NURSE PRACTITIONER

## 2021-03-06 PROCEDURE — 94799 UNLISTED PULMONARY SVC/PX: CPT

## 2021-03-06 PROCEDURE — 25010000002 CEFAZOLIN PER 500 MG: Performed by: INTERNAL MEDICINE

## 2021-03-06 PROCEDURE — 25010000002 HEPARIN (PORCINE) PER 1000 UNITS: Performed by: NURSE PRACTITIONER

## 2021-03-06 PROCEDURE — 25010000002 CEFAZOLIN PER 500 MG: Performed by: NURSE PRACTITIONER

## 2021-03-06 PROCEDURE — 99024 POSTOP FOLLOW-UP VISIT: CPT | Performed by: NEUROLOGICAL SURGERY

## 2021-03-06 PROCEDURE — 25010000002 HYDROMORPHONE 1 MG/ML SOLUTION: Performed by: NEUROLOGICAL SURGERY

## 2021-03-06 RX ORDER — DOCUSATE SODIUM 100 MG/1
100 CAPSULE, LIQUID FILLED ORAL 2 TIMES DAILY
Status: DISCONTINUED | OUTPATIENT
Start: 2021-03-06 | End: 2021-03-22 | Stop reason: HOSPADM

## 2021-03-06 RX ORDER — BUDESONIDE 0.5 MG/2ML
0.5 INHALANT ORAL 2 TIMES DAILY PRN
Status: DISCONTINUED | OUTPATIENT
Start: 2021-03-06 | End: 2021-03-22 | Stop reason: HOSPADM

## 2021-03-06 RX ORDER — HEPARIN SODIUM 5000 [USP'U]/ML
5000 INJECTION, SOLUTION INTRAVENOUS; SUBCUTANEOUS EVERY 12 HOURS SCHEDULED
Status: DISCONTINUED | OUTPATIENT
Start: 2021-03-06 | End: 2021-03-22 | Stop reason: HOSPADM

## 2021-03-06 RX ORDER — POLYETHYLENE GLYCOL 3350 17 G/17G
17 POWDER, FOR SOLUTION ORAL DAILY
Status: DISCONTINUED | OUTPATIENT
Start: 2021-03-07 | End: 2021-03-22 | Stop reason: HOSPADM

## 2021-03-06 RX ORDER — ASCORBIC ACID 500 MG
500 TABLET ORAL 2 TIMES DAILY
Status: DISCONTINUED | OUTPATIENT
Start: 2021-03-06 | End: 2021-03-22 | Stop reason: HOSPADM

## 2021-03-06 RX ORDER — DIAZEPAM 2 MG/1
2 TABLET ORAL EVERY 6 HOURS PRN
Status: DISPENSED | OUTPATIENT
Start: 2021-03-06 | End: 2021-03-13

## 2021-03-06 RX ORDER — BUPIVACAINE HCL/0.9 % NACL/PF 0.1 %
2 PLASTIC BAG, INJECTION (ML) EPIDURAL EVERY 8 HOURS SCHEDULED
Status: DISCONTINUED | OUTPATIENT
Start: 2021-03-06 | End: 2021-03-22 | Stop reason: HOSPADM

## 2021-03-06 RX ORDER — ONDANSETRON 2 MG/ML
4 INJECTION INTRAMUSCULAR; INTRAVENOUS EVERY 6 HOURS PRN
Status: DISCONTINUED | OUTPATIENT
Start: 2021-03-06 | End: 2021-03-22 | Stop reason: HOSPADM

## 2021-03-06 RX ORDER — CYCLOBENZAPRINE HCL 10 MG
10 TABLET ORAL 3 TIMES DAILY PRN
Status: DISCONTINUED | OUTPATIENT
Start: 2021-03-06 | End: 2021-03-10

## 2021-03-06 RX ORDER — AMOXICILLIN 250 MG
2 CAPSULE ORAL 2 TIMES DAILY
Status: DISCONTINUED | OUTPATIENT
Start: 2021-03-06 | End: 2021-03-22 | Stop reason: HOSPADM

## 2021-03-06 RX ORDER — CARVEDILOL 25 MG/1
25 TABLET ORAL 2 TIMES DAILY WITH MEALS
Status: DISCONTINUED | OUTPATIENT
Start: 2021-03-06 | End: 2021-03-22 | Stop reason: HOSPADM

## 2021-03-06 RX ORDER — ACETAMINOPHEN 650 MG/1
650 SUPPOSITORY RECTAL EVERY 4 HOURS PRN
Status: DISCONTINUED | OUTPATIENT
Start: 2021-03-06 | End: 2021-03-22 | Stop reason: HOSPADM

## 2021-03-06 RX ORDER — ONDANSETRON 4 MG/1
4 TABLET, FILM COATED ORAL EVERY 6 HOURS PRN
Status: DISCONTINUED | OUTPATIENT
Start: 2021-03-06 | End: 2021-03-22 | Stop reason: HOSPADM

## 2021-03-06 RX ORDER — SODIUM CHLORIDE 0.9 % (FLUSH) 0.9 %
10 SYRINGE (ML) INJECTION EVERY 12 HOURS SCHEDULED
Status: DISCONTINUED | OUTPATIENT
Start: 2021-03-06 | End: 2021-03-22 | Stop reason: HOSPADM

## 2021-03-06 RX ORDER — VALACYCLOVIR HYDROCHLORIDE 500 MG/1
500 TABLET, FILM COATED ORAL
Status: DISCONTINUED | OUTPATIENT
Start: 2021-03-07 | End: 2021-03-22 | Stop reason: HOSPADM

## 2021-03-06 RX ORDER — MULTIPLE VITAMINS W/ MINERALS TAB 9MG-400MCG
1 TAB ORAL DAILY
Status: DISCONTINUED | OUTPATIENT
Start: 2021-03-07 | End: 2021-03-22 | Stop reason: HOSPADM

## 2021-03-06 RX ORDER — OXYCODONE AND ACETAMINOPHEN 10; 325 MG/1; MG/1
1 TABLET ORAL EVERY 4 HOURS PRN
Status: DISCONTINUED | OUTPATIENT
Start: 2021-03-06 | End: 2021-03-10

## 2021-03-06 RX ORDER — ZINC SULFATE 50(220)MG
220 CAPSULE ORAL DAILY
Status: DISCONTINUED | OUTPATIENT
Start: 2021-03-06 | End: 2021-03-22 | Stop reason: HOSPADM

## 2021-03-06 RX ORDER — SACCHAROMYCES BOULARDII 250 MG
250 CAPSULE ORAL 2 TIMES DAILY
Status: DISCONTINUED | OUTPATIENT
Start: 2021-03-06 | End: 2021-03-22 | Stop reason: HOSPADM

## 2021-03-06 RX ORDER — NALOXONE HCL 0.4 MG/ML
0.1 VIAL (ML) INJECTION
Status: DISCONTINUED | OUTPATIENT
Start: 2021-03-06 | End: 2021-03-22 | Stop reason: HOSPADM

## 2021-03-06 RX ORDER — LABETALOL HYDROCHLORIDE 5 MG/ML
20 INJECTION, SOLUTION INTRAVENOUS
Status: DISCONTINUED | OUTPATIENT
Start: 2021-03-06 | End: 2021-03-22 | Stop reason: HOSPADM

## 2021-03-06 RX ORDER — CLONIDINE HYDROCHLORIDE 0.1 MG/1
0.1 TABLET ORAL 2 TIMES DAILY PRN
Status: DISCONTINUED | OUTPATIENT
Start: 2021-03-06 | End: 2021-03-22 | Stop reason: HOSPADM

## 2021-03-06 RX ORDER — GABAPENTIN 100 MG/1
100 CAPSULE ORAL EVERY 12 HOURS SCHEDULED
Status: DISCONTINUED | OUTPATIENT
Start: 2021-03-06 | End: 2021-03-22 | Stop reason: HOSPADM

## 2021-03-06 RX ORDER — BISACODYL 10 MG
10 SUPPOSITORY, RECTAL RECTAL DAILY PRN
Status: DISCONTINUED | OUTPATIENT
Start: 2021-03-06 | End: 2021-03-22 | Stop reason: HOSPADM

## 2021-03-06 RX ORDER — NITROGLYCERIN 0.4 MG/1
0.4 TABLET SUBLINGUAL
Status: DISCONTINUED | OUTPATIENT
Start: 2021-03-06 | End: 2021-03-22 | Stop reason: HOSPADM

## 2021-03-06 RX ORDER — PREDNISONE 1 MG/1
5 TABLET ORAL
Status: DISCONTINUED | OUTPATIENT
Start: 2021-03-07 | End: 2021-03-22 | Stop reason: HOSPADM

## 2021-03-06 RX ORDER — OXYCODONE AND ACETAMINOPHEN 7.5; 325 MG/1; MG/1
1 TABLET ORAL EVERY 4 HOURS PRN
Status: DISCONTINUED | OUTPATIENT
Start: 2021-03-06 | End: 2021-03-12

## 2021-03-06 RX ORDER — ISOSORBIDE MONONITRATE 30 MG/1
60 TABLET, EXTENDED RELEASE ORAL
Status: DISCONTINUED | OUTPATIENT
Start: 2021-03-06 | End: 2021-03-22 | Stop reason: HOSPADM

## 2021-03-06 RX ORDER — ACETAMINOPHEN 325 MG/1
650 TABLET ORAL EVERY 4 HOURS PRN
Status: DISCONTINUED | OUTPATIENT
Start: 2021-03-06 | End: 2021-03-22 | Stop reason: HOSPADM

## 2021-03-06 RX ORDER — PANTOPRAZOLE SODIUM 40 MG/1
40 TABLET, DELAYED RELEASE ORAL
Status: DISCONTINUED | OUTPATIENT
Start: 2021-03-07 | End: 2021-03-22 | Stop reason: HOSPADM

## 2021-03-06 RX ORDER — SODIUM CHLORIDE 0.9 % (FLUSH) 0.9 %
10 SYRINGE (ML) INJECTION AS NEEDED
Status: DISCONTINUED | OUTPATIENT
Start: 2021-03-06 | End: 2021-03-22 | Stop reason: HOSPADM

## 2021-03-06 RX ORDER — FOLIC ACID 1 MG/1
1 TABLET ORAL DAILY
Status: DISCONTINUED | OUTPATIENT
Start: 2021-03-07 | End: 2021-03-22 | Stop reason: HOSPADM

## 2021-03-06 RX ADMIN — CARVEDILOL 25 MG: 25 TABLET, FILM COATED ORAL at 08:32

## 2021-03-06 RX ADMIN — LABETALOL HYDROCHLORIDE 20 MG: 5 INJECTION, SOLUTION INTRAVENOUS at 05:31

## 2021-03-06 RX ADMIN — PANTOPRAZOLE SODIUM 40 MG: 40 TABLET, DELAYED RELEASE ORAL at 05:27

## 2021-03-06 RX ADMIN — DOCUSATE SODIUM 50 MG AND SENNOSIDES 8.6 MG 2 TABLET: 8.6; 5 TABLET, FILM COATED ORAL at 08:32

## 2021-03-06 RX ADMIN — FOLIC ACID 1 MG: 1 TABLET ORAL at 08:32

## 2021-03-06 RX ADMIN — HEPARIN SODIUM 5000 UNITS: 5000 INJECTION INTRAVENOUS; SUBCUTANEOUS at 05:27

## 2021-03-06 RX ADMIN — VANCOMYCIN HYDROCHLORIDE 1250 MG: 10 INJECTION, POWDER, LYOPHILIZED, FOR SOLUTION INTRAVENOUS at 05:57

## 2021-03-06 RX ADMIN — VALACYCLOVIR 500 MG: 500 TABLET, FILM COATED ORAL at 08:32

## 2021-03-06 RX ADMIN — LEVOTHYROXINE SODIUM 75 MCG: 25 TABLET ORAL at 05:27

## 2021-03-06 RX ADMIN — PREDNISONE 5 MG: 5 TABLET ORAL at 08:32

## 2021-03-06 RX ADMIN — GABAPENTIN 100 MG: 100 CAPSULE ORAL at 08:32

## 2021-03-06 RX ADMIN — OXYCODONE HYDROCHLORIDE AND ACETAMINOPHEN 500 MG: 500 TABLET ORAL at 08:32

## 2021-03-06 RX ADMIN — SODIUM CHLORIDE, PRESERVATIVE FREE 10 ML: 5 INJECTION INTRAVENOUS at 08:33

## 2021-03-06 RX ADMIN — OXYCODONE HYDROCHLORIDE AND ACETAMINOPHEN 1 TABLET: 10; 325 TABLET ORAL at 04:37

## 2021-03-06 RX ADMIN — HYDROMORPHONE HYDROCHLORIDE 2 MG: 1 INJECTION, SOLUTION INTRAMUSCULAR; INTRAVENOUS; SUBCUTANEOUS at 05:57

## 2021-03-06 RX ADMIN — ISOSORBIDE MONONITRATE 60 MG: 30 TABLET, EXTENDED RELEASE ORAL at 08:32

## 2021-03-06 RX ADMIN — GUAIFENESIN 600 MG: 600 TABLET, EXTENDED RELEASE ORAL at 08:32

## 2021-03-06 RX ADMIN — MULTIPLE VITAMINS W/ MINERALS TAB 1 TABLET: TAB at 08:32

## 2021-03-06 RX ADMIN — CEFAZOLIN SODIUM 2 G: 10 INJECTION, POWDER, FOR SOLUTION INTRAVENOUS at 05:27

## 2021-03-06 RX ADMIN — Medication 250 MG: at 08:32

## 2021-03-06 RX ADMIN — POLYETHYLENE GLYCOL 3350 17 G: 17 POWDER, FOR SOLUTION ORAL at 08:31

## 2021-03-06 RX ADMIN — DOCUSATE SODIUM 100 MG: 100 CAPSULE ORAL at 08:32

## 2021-03-06 RX ADMIN — LABETALOL HYDROCHLORIDE 20 MG: 5 INJECTION, SOLUTION INTRAVENOUS at 04:37

## 2021-03-06 NOTE — PLAN OF CARE
Goal Outcome Evaluation:  Plan of Care Reviewed With: patient  Progress: improving  Outcome Summary: VSS with exception of increased BP-med x2 prn for SBP >160. Pt c/o pain med prn and new prn added for additional relief per Shabbir. Pt rested well after additional dosing. Pt c/o pain is associated with NPWT change and not with her cervical procedure. GUILLERMO nonproductive this shift. Safety maintained, education completed and will continue to monitor.

## 2021-03-06 NOTE — PROGRESS NOTES
ARGELIA Guerrero APRN        Internal Medicine Progress Note    3/6/2021   15:33 CST    Name:  Tawny Shin  MRN:    [id number]     Acct:     [id number]   Room:  [address] [place] [address] Day: 0     Admit Date: 3/6/2021 11:36 AM  PCP: Davon Urrutia MD    Subjective:     C/C: Wound care and rehabilitation efforts.     Interval History: Status: stable.  Resting in bed.  No family at bedside.  Returned from [facility] from a 3-day leave of absence this morning.  She is postop day #2 C6 corpectomy for cervical stenosis by Dr. Shea. Cervical collar in place. Wound VAC intact to right hip.  Pain currently controlled.  No new concerns or needs voiced or identified.  Discussed plan of care with nurse.    Review of Systems   Constitutional: Positive for malaise/fatigue. Negative for chills and fever.   HENT: Negative for congestion, ear discharge and sore throat.         Cervical collar in place   Eyes: Negative for blurred vision, double vision and pain.   Cardiovascular: Negative for chest pain, claudication and dyspnea on exertion.   Respiratory: Negative for cough, hemoptysis and shortness of breath.    Endocrine: Positive for heat intolerance. Negative for cold intolerance.   Hematologic/Lymphatic: Negative for adenopathy and bleeding problem.   Skin: Positive for poor wound healing. Negative for itching and rash.   Musculoskeletal: Positive for back pain, falls and joint pain.   Gastrointestinal: Positive for constipation. Negative for abdominal pain, diarrhea, nausea and vomiting.   Genitourinary: Negative for bladder incontinence, flank pain and frequency.   Neurological: Positive for weakness. Negative for difficulty with concentration.   Psychiatric/Behavioral: Negative for altered mental status, depression and suicidal ideas.   Allergic/Immunologic: Negative for environmental allergies, HIV exposure and hives.         Medications:     Allergies:   Allergies    Allergen Reactions    Atorvastatin Other (See Comments)     LEG CRAMPS      Amoxicillin Rash    Escitalopram Rash    Nabumetone Rash    Niacin Er Rash    Penicillins Rash    Simvastatin Rash       Current Meds:   Current Facility-Administered Medications:     acetaminophen (TYLENOL) tablet 650 mg, 650 mg, Oral, Q4H PRN **OR** acetaminophen (TYLENOL) suppository 650 mg, 650 mg, Rectal, Q4H PRN, Beni Urrutia MD    ascorbic acid (VITAMIN C) tablet 500 mg, 500 mg, Oral, BID, Beni Urrutia MD    bisacodyl (DULCOLAX) suppository 10 mg, 10 mg, Rectal, Daily PRN, Beni Urrutia MD    budesonide (PULMICORT) nebulizer solution 0.5 mg, 0.5 mg, Nebulization, BID PRN, Beni Urrutia MD    carvedilol (COREG) tablet 25 mg, 25 mg, Oral, BID With Meals, Beni Urrutia MD    ceFAZolin in 0.9% normal saline (ANCEF) IVPB solution 2 g, 2 g, Intravenous, Q8H, Beni Urrutia MD    cloNIDine (CATAPRES) tablet 0.1 mg, 0.1 mg, Oral, BID PRN, Beni Urrutia MD    cyclobenzaprine (FLEXERIL) tablet 10 mg, 10 mg, Oral, TID PRN, Beni Urrutia MD    diazePAM (VALIUM) tablet 2 mg, 2 mg, Oral, Q6H PRN, Beni Urrutia MD    docusate sodium (COLACE) capsule 100 mg, 100 mg, Oral, BID, Beni Urrutia MD    [START ON 3/7/2021] folic acid (FOLVITE) tablet 1 mg, 1 mg, Oral, Daily, Beni Urrutia MD    gabapentin (NEURONTIN) capsule 100 mg, 100 mg, Oral, Q12H, Beni Urrutia MD    heparin (porcine) 5000 UNIT/ML injection 5,000 Units, 5,000 Units, Subcutaneous, Q12H, Beni Urrutia MD    isosorbide mononitrate (IMDUR) 24 hr tablet 60 mg, 60 mg, Oral, BID - Nitrates, Beni Urrutia MD    labetalol (NORMODYNE,TRANDATE) injection 20 mg, 20 mg, Intravenous, Q10 Min PRN, Beni Urrutia MD    [START ON 3/7/2021] levothyroxine (SYNTHROID, LEVOTHROID) tablet 75 mcg, 75 mcg, Oral, Q AM, Beni Urrutia MD    magnesium hydroxide (MILK OF  MAGNESIA) suspension 2400 mg/10mL 10 mL, 10 mL, Oral, Daily PRN, Beni Urrutia MD    [START ON 3/7/2021] multivitamin with minerals 1 tablet, 1 tablet, Oral, Daily, Beni Urrutia MD    naloxone (NARCAN) injection 0.1 mg, 0.1 mg, Intravenous, Q5 Min PRN, Beni Urrutia MD    nitroglycerin (NITROSTAT) SL tablet 0.4 mg, 0.4 mg, Sublingual, Q5 Min PRN, Beni Urrutia MD    ondansetron (ZOFRAN) tablet 4 mg, 4 mg, Oral, Q6H PRN **OR** ondansetron (ZOFRAN) injection 4 mg, 4 mg, Intravenous, Q6H PRN, Beni Urrutia MD    oxyCODONE-acetaminophen (PERCOCET)  MG per tablet 1 tablet, 1 tablet, Oral, Q4H PRN, Beni Urrutia MD    oxyCODONE-acetaminophen (PERCOCET) 7.5-325 MG per tablet 1 tablet, 1 tablet, Oral, Q4H PRN, Beni Urrutia MD    [START ON 3/7/2021] pantoprazole (PROTONIX) EC tablet 40 mg, 40 mg, Oral, QAM AC, Beni Urrutia MD    [START ON 3/7/2021] polyethylene glycol (MIRALAX) packet 17 g, 17 g, Oral, Daily, Beni Urrutia MD    [START ON 3/7/2021] predniSONE (DELTASONE) tablet 5 mg, 5 mg, Oral, Daily With Breakfast, Beni Urrutia MD    saccharomyces boulardii (FLORASTOR) capsule 250 mg, 250 mg, Oral, BID, Beni Urrutia MD    sennosides-docusate (PERICOLACE) 8.6-50 MG per tablet 2 tablet, 2 tablet, Oral, BID, Beni Urrutia MD    sodium chloride 0.9 % flush 10 mL, 10 mL, Intravenous, Q12H, Beni Urrutia MD    sodium chloride 0.9 % flush 10 mL, 10 mL, Intravenous, PRN, Beni Urrutia MD    [START ON 3/7/2021] valACYclovir (VALTREX) tablet 500 mg, 500 mg, Oral, Q24H, Beni Urrutia MD    vancomycin 1250 mg/250 mL 0.9% NS IVPB ([facility]), 1,250 mg, Intravenous, Q12H, Beni Urrutia MD    zinc sulfate (ZINCATE) capsule 220 mg, 220 mg, Oral, Daily, Beni Urrutia MD    Data:     Code Status:    There are no questions and answers to display.       Family History   Problem Relation  Age of Onset    Cancer Mother     Heart disease Father        Social History     Socioeconomic History    Marital status:      Spouse name: Not on file    Number of children: Not on file    Years of education: Not on file    Highest education level: Not on file   Tobacco Use    Smoking status: Never Smoker    Smokeless tobacco: Never Used   Substance and Sexual Activity    Alcohol use: No    Drug use: Never    Sexual activity: Defer       Vitals:  There were no vitals taken for this visit.  T 99.3 P 79 R 20 /97 Sp02 95% (room air)    I/O (24Hr):  No intake or output data in the 24 hours ending 03/06/21 1533    Labs and imaging:      No results found for this or any previous visit (from the past 12 hour(s)).    Physical Examination:        Physical Exam  Vitals and nursing note reviewed.   Constitutional:       Appearance: Normal appearance.   HENT:      Head: Normocephalic and atraumatic.      Right Ear: External ear normal.      Left Ear: External ear normal.      Nose: Nose normal.      Mouth/Throat:      Mouth: Mucous membranes are moist.      Pharynx: Oropharynx is clear.   Eyes:      Extraocular Movements: Extraocular movements intact.      Pupils: Pupils are equal, round, and reactive to light.   Neck:      Comments: Cervical collar in place  Cardiovascular:      Rate and Rhythm: Normal rate and regular rhythm.      Pulses: Normal pulses.      Heart sounds: Normal heart sounds.   Pulmonary:      Effort: Pulmonary effort is normal.      Breath sounds: Normal breath sounds.   Abdominal:      General: Abdomen is flat. Bowel sounds are normal.      Palpations: Abdomen is soft.   Musculoskeletal:      Cervical back: Normal range of motion. Pain with movement present.      Comments: Generalized weakness  Limited ROM RLE   Skin:     General: Skin is warm and dry.      Capillary Refill: Capillary refill takes less than 2 seconds.      Comments: Wound vac intact   Neurological:      General: No focal  deficit present.      Mental Status: She is alert and oriented to person, place, and time.   Psychiatric:         Mood and Affect: Mood normal.         Behavior: Behavior normal.         Thought Content: Thought content normal.           Assessment:             * No active hospital problems. *    Past Medical History:   Diagnosis Date    Arthritis     Asthma     Chronic sinusitis     Coronary artery disease     Eustachian tube dysfunction     Heart disease     Herpes simplex     Hyperlipidemia     Hypertension     Knee dislocation     Labral tear of right hip joint     Laryngitis sicca     Laryngitis, chronic     MI (myocardial infarction) (CMS/[facility])     Otorrhea     Sensorineural hearing loss     Sjogren's disease (CMS/[facility])         Plan:        MSSA bacteremia  Torn gluteus muscle s/p repair  Post-operative MSSA wound infection  RA  Chronic immunosuppression  Multifactorial anemia  Hypothyroidism  GERD  Hx CAD  Herpes simplex on chronic suppressive therapy  HTN  Right psoas abscess  L1-L2 discitis osteomyelitis  Constipation  Cervical spinal stenosis, s/p C6 corpectomy, POD #2    Continue current treatment. Monitor counts. Increase activity as tolerated. Maintain patient safety.  Wound care per wound care team. Aggressive therapy as tolerated with activity/weightbearing restrictions per ortho recommendations.  Antibiotics under direction of ID. Continue pain control efforts and avoid oversedation. Continue bowel regimen.      Electronically signed by DANIELA Brink on 3/6/2021 at 15:33 CST     I have discussed the care of Tawny Shin, including pertinent history and exam findings, with the nurse practitioner.    I have seen and examined the patient and the key elements of all parts of the encounter have been performed by me.  I agree with the assessment, plan and orders as documented by DANIELA Morales, after I modified the exam findings and the plan of treatments and the final version is my approved version  of the assessment.        Electronically signed by Beni Urrutia MD on 3/6/2021 at 19:06 CST

## 2021-03-06 NOTE — PLAN OF CARE
Goal Outcome Evaluation:  Plan of Care Reviewed With: patient, daughter      Pt is a + o x4, NPWT in Right hip 125 suction.  Pt has had increased pain today r/t neck sx. NPWT dressing was changed by PT today

## 2021-03-06 NOTE — PROGRESS NOTES
Infectious Diseases Progress Note    Patient:  Tawny Shin  YOB: 1955  MRN: [id number]   Admit date: 2/12/2021   Admitting Physician: Bandar Shea, *  Primary Care Physician: Davon Urrutia MD    Chief Complaint/Interval History: She is without fever.  She is up to chair.  Family at bedside.  No new symptoms.  Discussed with Dr. Shea.  No rash or skin itching.  Does not seem to have any cardiopulmonary complaints.    Intake/Output Summary (Last 24 hours) at 3/6/2021 1016  Last data filed at 3/5/2021 2124  Gross per 24 hour   Intake 2471.95 ml   Output 20 ml   Net 2451.95 ml     Allergies:   Allergies   Allergen Reactions   • Atorvastatin Other (See Comments)     LEG CRAMPS     • Amoxicillin Rash   • Escitalopram Rash   • Nabumetone Rash   • Niacin Er Rash   • Penicillins Rash   • Simvastatin Rash     Current Scheduled Medications:   ascorbic acid, 500 mg, Oral, BID  carvedilol, 25 mg, Oral, BID With Meals  ceFAZolin, 2 g, Intravenous, Q8H  docusate sodium, 100 mg, Oral, BID  folic acid, 1 mg, Oral, Daily  gabapentin, 100 mg, Oral, Q12H  guaiFENesin, 600 mg, Oral, Q12H  heparin (porcine), 5,000 Units, Subcutaneous, Q12H  isosorbide mononitrate, 60 mg, Oral, BID - Nitrates  levothyroxine, 75 mcg, Oral, Q AM  multivitamin with minerals, 1 tablet, Oral, Daily  pantoprazole, 40 mg, Oral, Q AM  polyethylene glycol, 17 g, Oral, Daily  predniSONE, 5 mg, Oral, Daily With Breakfast  saccharomyces boulardii, 250 mg, Oral, BID  senna-docusate sodium, 2 tablet, Oral, BID  sodium chloride, 10 mL, Intravenous, Q12H  valACYclovir, 500 mg, Oral, Q24H  vancomycin, 1,250 mg, Intravenous, Q12H  zinc sulfate, 220 mg, Oral, Daily      Current PRN Medications:  •  acetaminophen **OR** acetaminophen  •  bisacodyl  •  budesonide  •  cloNIDine  •  cyclobenzaprine  •  diazePAM  •  HYDROmorphone  •  labetalol  •  magnesium hydroxide  •  naloxone  •  nitroglycerin  •  ondansetron **OR** ondansetron  •   "oxyCODONE-acetaminophen  •  oxyCODONE-acetaminophen  •  sodium chloride  •  sodium chloride    Review of Systems see HPI.    Vital Signs:  /58 (BP Location: Left arm, Patient Position: Lying)   Pulse 83   Temp 99 °F (37.2 °C) (Temporal)   Resp 18   Ht 165.1 cm (65\")   Wt 106 kg (233 lb 12.8 oz)   SpO2 95%   Breastfeeding No   BMI 38.91 kg/m²     Physical Exam  Vital signs - reviewed.  Up to chair, alert, smiling, interactive, and in no distress.  Line/IV (right arm peripheral) site - No erythema, warmth, induration, or tenderness.  Bilateral lower extremity sensation intact.  She has intact motor both lower extremities.  She is a little more sore and a little weak with flexion of the right knee and right hip.  Both of those findings improved from prior to cervical spine surgery.  Trace to 1+ bilateral lower extremity edema.    Lab Results:  CBC:   Results from last 7 days   Lab Units 03/05/21  0436 03/04/21  0315 03/03/21  0454 03/02/21  0331 03/01/21  1250 03/01/21  0459 02/28/21  0430   WBC 10*3/mm3 5.99 3.46 5.29 6.10  --  6.52 5.38   HEMOGLOBIN g/dL 9.9* 9.5* 7.7* 7.8*  --  8.9* 8.7*   HEMATOCRIT % 30.6* 29.1* 23.9* 24.8* 27.6* 27.6* 26.4*   PLATELETS 10*3/mm3 243 221 254 223 251 251 286     BMP:  Results from last 7 days   Lab Units 03/05/21  0436 03/04/21  0315   SODIUM mmol/L 140 137   POTASSIUM mmol/L 4.4 4.4   CHLORIDE mmol/L 106 103   CO2 mmol/L 27.0 26.0   BUN mg/dL 19 11   CREATININE mg/dL 0.51* 0.35*   GLUCOSE mg/dL 101* 145*   CALCIUM mg/dL 9.0 8.9     Culture Results:   Wound Culture   Date Value Ref Range Status   03/01/2021 Scant growth (1+) Staphylococcus aureus (A)  Final   03/01/2021 Scant growth (1+) Mixed Gram Negative Selina (A)  Final   03/01/2021 Scant growth (1+) Enterococcus faecalis (A)  Final   03/01/2021 Scant growth (1+) Normal Skin Selina  Final     Radiology: None  Additional Studies Reviewed: None    Impression:   1.  MSSA bacteremia-improving  2.  Postoperative " infection following right hip gluteus muscle repair-improving  3.  Right psoas abscess-Staph aureus on culture.  Suspect will be MSSA.  Have asked micro to repeat susceptibility to be conservative.  4.  Discitis/osteomyelitis L1/L2-no new findings  5.  Cervical stenosis-improved following cervical spine surgery  6.  Rheumatoid arthritis    Recommendations:   Continue cefazolin  Discontinue vancomycin  Await susceptibility on staph aureus from psoas abscess-would anticipate this will be MSSA  Continue physical therapy  Continue supportive care  Will see again Monday  Please contact Dr. Stanley in the interim if new problems or additional questions for infectious diseases    Elie Ohara MD

## 2021-03-06 NOTE — PROGRESS NOTES
Continued Stay Note   [facility]     Patient Name: Tawny Shin  MRN: [id number]  Today's Date: 3/6/2021    Admit Date: 2/12/2021    Discharge Plan     Row Name 03/06/21 1117       Plan    Final Discharge Disposition Code  63 - [facility]    Final Note  Pt is [facility].        Discharge Codes    No documentation.       Expected Discharge Date and Time     Expected Discharge Date Expected Discharge Time    Mar 6, 2021             OSIEL Pizano

## 2021-03-06 NOTE — PLAN OF CARE
Goal Outcome Evaluation:  Plan of Care Reviewed With: patient, daughter      Pt is a + O x 4.  Very drowsy and groggy this am.  Right hip wound to NPWT is very red, warm to touch, and edematous.  Pt to be discharged to [facility] on 5th floor this am

## 2021-03-06 NOTE — PROGRESS NOTES
NEUROSURGERY DAILY PROGRESS NOTE    ASSESSMENT:   Tawny Shin is a 65 y.o. with a significant comorbidity rheumatoid arthritis on disease modifying agents, prior lumbar fusion by Dr. Oropeza in 2018.  She presents with a new problem of postop surgical infection of her right hip with wound VAC dressing, progressive right lower extremity proximal weakness without numbness or radiculopathy. Physical exam findings of right iliopsoas weakness, and global hyperreflexia with Babinski and right Patience's and decreased  strength bilaterally.  Their imaging shows CT of the cervical spine with severe cervical stenosis at severe stenosis C5-6 and C6/7 and adjacent segment disease at L1/2 and L2/3 with concern for osteomyelitis discitis.    Past Medical History:   Diagnosis Date   • Arthritis    • Asthma    • Chronic sinusitis    • Coronary artery disease    • Eustachian tube dysfunction    • Heart disease    • Herpes simplex    • Hyperlipidemia    • Hypertension    • Knee dislocation    • Labral tear of right hip joint    • Laryngitis sicca    • Laryngitis, chronic    • MI (myocardial infarction) (CMS/HCC)    • Otorrhea    • Sensorineural hearing loss    • Sjogren's disease (CMS/HCC)      Active Hospital Problems    Diagnosis    • Stenosis of cervical spine with myelopathy (CMS/HCC)      Added automatically from request for surgery [id number]     • Spinal stenosis, lumbar region, with neurogenic claudication      Added automatically from request for surgery [id number]     • Discitis of lumbar region      Added automatically from request for surgery [id number]     • Osteomyelitis of lumbar spine (CMS/HCC)      Added automatically from request for surgery [id number]       PLAN:   Neuro: Stable.  Improved  strength.  Improved right lower extremity strength     Cervical stenosis    POD #3 C6 corpectomy   GUILLERMO =50 mL, DCed at bedside   Cervical collar in place.  Trachea midline.  No difficulty with swallowing   Postop x-rays  "stable   Continue neurochecks every 4 hours     Right psoas abscess   I&D right psoas abscess performed per IR (3/2/2021)   Body fluid culture: 4+ WBCs, rare 1+ gram-positive cocci   Light growth 2+ Staph aureus   Continue antibiotics per ID    Concern for discitis/osteomyelitis L1/2  L1/2 severe central canal stenosis  Severe stenosis from adjacent segment disease   We will continue to monitor for now   May require L1 laminectomy with possible discectomy, foraminotomy, and debridement in the near future   Continue antibiotics per ID     CV: JADEN.  VS WNL  Pulm: No acute issues.  Maintaining O2 sat.  Continuous pulse oximetry   : Remove Horan ASAP.  Bladder scans and I/O cath per policy  FEN: Cleared for oral diet.  Meds in apple sauce.   GI: Prophylaxis  ORTHO: Open wound right posterior hip.  MSSA infection, wound vac per PT   3/4/2021: Scant growth Staphylococcus aureus        Scant growth Enterococcus     ID: MSSA bacteria and Enterococcus.  Postop vancomycin and cefazolin   Then continue cafazolin   CRP continues to fall.   ORTHO: Heme:  DVT prophylaxis; SCD's  Pain: Tolerable at present, DC PCA and switch to oral meds   Dispo: PT/OT.      Max ambulation allowable by Ortho.    CHIEF COMPLAINT:   Right-sided lumbar back pain with a new complaint of right anterior thigh pain    Subjective  Symptom stable.  Temp:  [98 °F (36.7 °C)-99.2 °F (37.3 °C)] 99 °F (37.2 °C)  Heart Rate:  [74-91] 83  Resp:  [18] 18  BP: (136-175)/(53-76) 153/58    Objective:  General Appearance:  Comfortable, well-appearing and in no acute distress.    Vital signs: (most recent): Blood pressure 153/58, pulse 83, temperature 99 °F (37.2 °C), temperature source Temporal, resp. rate 18, height 165.1 cm (65\"), weight 106 kg (233 lb 12.8 oz), SpO2 95 %, not currently breastfeeding.      Neurologic Exam     Mental Status   Oriented to person, place, and time.   Attention: normal. Concentration: normal.   Speech: speech is normal   Level of " consciousness: alert    Bright and awake.  Oriented x3.  Follows commands without prompting showing thumbs up into fingers bilaterally.     Cranial Nerves     CN II   Visual fields full to confrontation.     CN III, IV, VI   Pupils are equal, round, and reactive to light.  Extraocular motions are normal.     CN V   Facial sensation intact.     CN VII   Facial expression full, symmetric.     CN VIII   CN VIII normal.     CN IX, X   CN IX normal.     CN XI   CN XI normal.     Motor Exam     Strength   Right deltoid: 5/5  Left deltoid: 5/5  Right biceps: 5/5  Left biceps: 5/5  Right triceps: 5/5  Left triceps: 5/5  Right wrist extension: 5/5  Left wrist extension: 5/5  Right interossei: 4/5  Left interossei: 4/5  Right iliopsoas: 2/5  Left iliopsoas: 5/5  Right quadriceps: 2/5  Left quadriceps: 5/5  Right anterior tibial: 3/5  Left anterior tibial: 5/5  Right gastroc: 4/5  Left gastroc: 5/5       Sensory Exam   Sensory deficit distribution on right: L1    Gait, Coordination, and Reflexes     Tremor   Resting tremor: absent  Intention tremor: absent  Action tremor: absent    Reflexes   Right plantar: upgoing  Left plantar: normal  Right Mims: present  Left Mims: absent  Right ankle clonus: present  Left ankle clonus: present  Right pendular knee jerk: absent  Left pendular knee jerk: absent    Drains: * No LDAs found *    Imaging Results (Last 24 Hours)     ** No results found for the last 24 hours. **        Lab Results (last 24 hours)     ** No results found for the last 24 hours. **        DANIELA Velazquez

## 2021-03-06 NOTE — PROGRESS NOTES
Adult Nutrition  Assessment/PES    Patient Name:  Tawny Shin  YOB: 1955  MRN: [id number]  Admit Date:  3/6/2021    Assessment Date:  3/6/2021    Comments:  New admit to [facility]. Pt was previously admitted on [facility]. Her appetite at [facility] was fair, 50% x3 meals. Resumed oral supplements of Boost daily with all meals. She has increased nutrition needs to promote wound healing. Will encourage and monitor PO intake and continue to follow.     Reason for Assessment     Row Name 03/06/21 1207          Reason for Assessment    Reason For Assessment  per organizational policy new admit to [facility]     Diagnosis  surgery/postoperative complications;infection/sepsis     Identified At Risk by Screening Criteria  large or nonhealing wound, burn or pressure injury         Nutrition/Diet History     Row Name 03/06/21 1208          Nutrition/Diet History    Typical Food/Fluid Intake  Pt was discharged from [facility] to [facility] for sx. She is now admitted back to [facility]. Will resume her oral supplements and monitor PO intake.     Supplemental Drinks/Foods/Additives  Resume Boost TID         Anthropometrics     Row Name 03/06/21 1210          Admit Weight    Admit Weight  106 kg (233 lb) Pt previous admission wt of 209#        Body Mass Index (BMI)    BMI Assessment  BMI 35-39.9: obesity grade II         Labs/Tests/Procedures/Meds     Row Name 03/06/21 1210          Labs/Procedures/Meds    Lab Results Reviewed  reviewed        Diagnostic Tests/Procedures    Diagnostic Test/Procedure Reviewed  reviewed     Diagnostic Test/Procedures Comments  s/p CERVICAL 6 CORPECTOMY WITH TITANIUM CAGE        Medications    Pertinent Medications Reviewed  reviewed         Physical Findings     Row Name 03/06/21 1211          Physical Findings    Overall Physical Appearance  obese;other (see comments) generalized weakness     Skin  surgical incision;poor skin integrity/turgor;edema         Estimated/Assessed Needs     Row Name 03/06/21 1211           Calculation Measurements    Weight Used For Calculations  106 kg (233 lb)        Estimated/Assessed Needs    Additional Documentation  Fluid Requirements (Group);KCAL/KG (Group);Protein Requirements (Group)        KCAL/KG    KCAL/KG  20 Kcal/Kg (kcal)     20 Kcal/Kg (kcal)  2113.76        Protein Requirements    Weight Used For Protein Calculations  56.7 kg (125 lb)     Est Protein Requirement Amount (gms/kg)  1.5 gm protein     Estimated Protein Requirements (gms/day)  85.05        Fluid Requirements    Fluid Requirements (mL/day)  2200     Estimated Fluid Requirement Method  RDA Method     RDA Method (mL)  2200         Nutrition Prescription Ordered     Row Name 03/06/21 1213          Nutrition Prescription PO    Current PO Diet  Regular     Fluid Consistency  Thin     Supplement  Boost Plus (Ensure Enlive, Ensure Plus)     Supplement Frequency  3 times a day         Evaluation of Received Nutrient/Fluid Intake     Row Name 03/06/21 1211          Nutrient/Fluid Evaluation    Additional Documentation  Fluid Intake Evaluation (Group) new admit to [facility], no fluid intake documented at this time        PO Evaluation    % PO Intake  PO at previous facility 50% avg of 3 meals.               Problem/Interventions:  Problem 1     Row Name 03/06/21 1216          Nutrition Diagnoses Problem 1    Problem 1  Increased Nutrient Needs     Macronutrient  Protein     Etiology (related to)  Medical Diagnosis     Infectious Disease  MSSA     Skin  Surgical wound;Other (comment) Rt hip infection s/p reapair of torn gluteus; wound vac; s/p CERVICAL 6 CORPECTOMY WITH TITANIUM CAGE     Signs/Symptoms (evidenced by)  Other (comment) to promote wound healing               Intervention Goal     Row Name 03/06/21 1216          Intervention Goal    General  Maintain nutrition;Reduce/improve symptoms;Meet nutritional needs for age/condition;Disease management/therapy     PO  Increase intake;Meet estimated needs     Weight  No significant  weight loss         Nutrition Intervention     Row Name 03/06/21 1217          Nutrition Intervention    RD/Tech Action  Follow Tx progress;Care plan reviewd;Recommend/ordered     Recommended/Ordered  Supplement         Nutrition Prescription     Row Name 03/06/21 1217          Nutrition Prescription PO    PO Prescription  Begin/change supplement     Supplement  Boost Plus     Supplement Frequency  3 times a day     New PO Prescription Ordered?  Yes         Education/Evaluation     Row Name 03/06/21 1217          Education    Education  No discharge needs identified at this time        Monitor/Evaluation    Monitor  Per protocol           Electronically signed by:  Lauren Whitmore  03/06/21 12:18 CST

## 2021-03-07 LAB — BACTERIA SPEC ANAEROBE CULT: NORMAL

## 2021-03-07 PROCEDURE — 63710000001 PREDNISONE PER 5 MG: Performed by: INTERNAL MEDICINE

## 2021-03-07 PROCEDURE — 25010000002 CEFAZOLIN PER 500 MG: Performed by: INTERNAL MEDICINE

## 2021-03-07 PROCEDURE — 25010000002 HEPARIN (PORCINE) PER 1000 UNITS: Performed by: INTERNAL MEDICINE

## 2021-03-07 RX ORDER — LIDOCAINE 50 MG/G
1 PATCH TOPICAL
Status: DISCONTINUED | OUTPATIENT
Start: 2021-03-07 | End: 2021-03-07

## 2021-03-07 RX ORDER — LIDOCAINE 50 MG/G
1 PATCH TOPICAL DAILY PRN
Status: DISCONTINUED | OUTPATIENT
Start: 2021-03-07 | End: 2021-03-22 | Stop reason: HOSPADM

## 2021-03-07 NOTE — THERAPY DISCHARGE NOTE
Acute Care - Speech Language Pathology Discharge Summary  [facility]       Patient Name: Tawny Shin  : 1955  MRN: [id number]    Today's Date: 3/7/2021                   Admit Date: 2021      SLP Recommendation and Plan  [facility] soft diet with thin liquids.   Visit Dx:    ICD-10-CM ICD-9-CM   1. Stenosis of cervical spine with myelopathy (CMS/[facility])  M48.02 723.0    G99.2 336.3   2. Gait abnormality  R26.9 781.2   3. Spinal stenosis, lumbar region, with neurogenic claudication  M48.062 724.03   4. Discitis of lumbar region  M46.46 722.93   5. Osteomyelitis of lumbar spine (CMS/[facility])  M46.26 730.28   6. Staphylococcus aureus bacteremia  R78.81 790.7    B95.61 041.11   7. Impaired mobility and ADLs  Z74.09 V49.89    Z78.9    8. Open wound of right hip, subsequent encounter  S71.001D V58.89     890.0   9. Dysphagia, unspecified type  R13.10 787.20               SLP GOALS     Row Name 21 1254 21 1011          Oral Nutrition/Hydration Goal 1 (SLP)    Oral Nutrition/Hydration Goal 1, SLP  Pt will tolerate least restrictive diet with no overt s/s of aspiration.   -MM  Pt will tolerate least restrictive diet with no overt s/s of aspiration.   -MB     Time Frame (Oral Nutrition/Hydration Goal 1, SLP)  by discharge  -MM  by discharge  -MB     Barriers (Oral Nutrition/Hydration Goal 1, SLP)  n/a  -MM  n/a  -MB     Progress/Outcomes (Oral Nutrition/Hydration Goal 1, SLP)  goal partially met;discharged from facility  -MM  goal ongoing  -MB       User Key  (r) = Recorded By, (t) = Taken By, (c) = Cosigned By    Initials Name Provider Type    Dillon Ball, CCC-SLP Speech and Language Pathologist    Nicole Tamayo, MS CCC-SLP Speech and Language Pathologist                  SLP Discharge Summary  Anticipated Discharge Disposition (SLP): unknown  Reason for Discharge: discharge from this facility  Progress Toward Achieving Short/long Term Goals: goals partially met within established  timelines  Discharge Destination: long term acute care facility      Nicole Acuña, MS CCC-SLP  3/7/2021

## 2021-03-07 NOTE — PROGRESS NOTES
ARGELIA Guerrero APRN        Internal Medicine Progress Note    3/7/2021   15:32 CST    Name:  Tawny Shin  MRN:    [id number]     Acct:     [id number]   Room:  [address] [place] [address] Day: 0     Admit Date: 3/6/2021 11:36 AM  PCP: Davon Urrutia MD    Subjective:     C/C: Wound care and rehabilitation efforts.     Interval History: Status: stable.  Resting in bed.  No family at bedside.  Postop day #3 C6 corpectomy with cervical stenosis.  Cervical collar in place.  Wound VAC intact to right hip.  Afebrile.  Complains of increased back pain.  Discussed plan of care with nurse.    Review of Systems   Constitutional: Positive for malaise/fatigue. Negative for chills and fever.   HENT: Negative for congestion, ear discharge and sore throat.         Cervical collar in place   Eyes: Negative for blurred vision, double vision and pain.   Cardiovascular: Negative for chest pain, claudication and dyspnea on exertion.   Respiratory: Negative for cough, hemoptysis and shortness of breath.    Endocrine: Positive for heat intolerance. Negative for cold intolerance.   Hematologic/Lymphatic: Negative for adenopathy and bleeding problem.   Skin: Positive for poor wound healing. Negative for itching and rash.   Musculoskeletal: Positive for back pain, falls and joint pain.   Gastrointestinal: Positive for constipation. Negative for abdominal pain, diarrhea, nausea and vomiting.   Genitourinary: Negative for bladder incontinence, flank pain and frequency.   Neurological: Positive for weakness. Negative for difficulty with concentration.   Psychiatric/Behavioral: Negative for altered mental status, depression and suicidal ideas.   Allergic/Immunologic: Negative for environmental allergies, HIV exposure and hives.         Medications:     Allergies:   Allergies   Allergen Reactions    Atorvastatin Other (See Comments)     LEG CRAMPS      Amoxicillin Rash    Escitalopram Rash    Nabumetone Rash     Niacin Er Rash    Penicillins Rash    Simvastatin Rash       Current Meds:   Current Facility-Administered Medications:     acetaminophen (TYLENOL) tablet 650 mg, 650 mg, Oral, Q4H PRN **OR** acetaminophen (TYLENOL) suppository 650 mg, 650 mg, Rectal, Q4H PRN, Beni Urrutia MD    ascorbic acid (VITAMIN C) tablet 500 mg, 500 mg, Oral, BID, Beni Urrutia MD    bisacodyl (DULCOLAX) suppository 10 mg, 10 mg, Rectal, Daily PRN, Beni Urrutia MD    budesonide (PULMICORT) nebulizer solution 0.5 mg, 0.5 mg, Nebulization, BID PRN, Beni Urrutia MD    carvedilol (COREG) tablet 25 mg, 25 mg, Oral, BID With Meals, Beni Urrutia MD    ceFAZolin in 0.9% normal saline (ANCEF) IVPB solution 2 g, 2 g, Intravenous, Q8H, Beni Urrutia MD    cloNIDine (CATAPRES) tablet 0.1 mg, 0.1 mg, Oral, BID PRN, Beni Urrutia MD    cyclobenzaprine (FLEXERIL) tablet 10 mg, 10 mg, Oral, TID PRN, Beni Urrutia MD    diazePAM (VALIUM) tablet 2 mg, 2 mg, Oral, Q6H PRN, Beni Urrutia MD    docusate sodium (COLACE) capsule 100 mg, 100 mg, Oral, BID, Beni Urrutia MD    folic acid (FOLVITE) tablet 1 mg, 1 mg, Oral, Daily, Beni Urrutia MD    gabapentin (NEURONTIN) capsule 100 mg, 100 mg, Oral, Q12H, Beni Urrutia MD    heparin (porcine) 5000 UNIT/ML injection 5,000 Units, 5,000 Units, Subcutaneous, Q12H, Beni Urrutia MD    isosorbide mononitrate (IMDUR) 24 hr tablet 60 mg, 60 mg, Oral, BID - Nitrates, Beni Urrutia MD    labetalol (NORMODYNE,TRANDATE) injection 20 mg, 20 mg, Intravenous, Q10 Min PRN, Beni Urrutia MD    levothyroxine (SYNTHROID, LEVOTHROID) tablet 75 mcg, 75 mcg, Oral, Q AM, Beni Urrutia MD    lidocaine (LIDODERM) 5 % 1 patch, 1 patch, Transdermal, Q24H, Beni Urrutia MD    magnesium hydroxide (MILK OF MAGNESIA) suspension 2400 mg/10mL 10 mL, 10 mL, Oral, Daily PRN, Beni Urrutia  MD Wesley    multivitamin with minerals 1 tablet, 1 tablet, Oral, Daily, Beni Urrutia MD    naloxone (NARCAN) injection 0.1 mg, 0.1 mg, Intravenous, Q5 Min PRN, Beni Urrutia MD    nitroglycerin (NITROSTAT) SL tablet 0.4 mg, 0.4 mg, Sublingual, Q5 Min PRN, Beni Urrutia MD    ondansetron (ZOFRAN) tablet 4 mg, 4 mg, Oral, Q6H PRN **OR** ondansetron (ZOFRAN) injection 4 mg, 4 mg, Intravenous, Q6H PRN, Beni Urrutia MD    oxyCODONE-acetaminophen (PERCOCET)  MG per tablet 1 tablet, 1 tablet, Oral, Q4H PRN, Beni Urrutia MD    oxyCODONE-acetaminophen (PERCOCET) 7.5-325 MG per tablet 1 tablet, 1 tablet, Oral, Q4H PRN, Beni Urrutia MD    pantoprazole (PROTONIX) EC tablet 40 mg, 40 mg, Oral, QAM AC, Beni Urrutia MD    polyethylene glycol (MIRALAX) packet 17 g, 17 g, Oral, Daily, Beni Urrutia MD    predniSONE (DELTASONE) tablet 5 mg, 5 mg, Oral, Daily With Breakfast, Beni Urrutia MD    saccharomyces boulardii (FLORASTOR) capsule 250 mg, 250 mg, Oral, BID, Beni Urrutia MD    sennosides-docusate (PERICOLACE) 8.6-50 MG per tablet 2 tablet, 2 tablet, Oral, BID, Beni Urrutia MD    sodium chloride 0.9 % flush 10 mL, 10 mL, Intravenous, Q12H, Beni Urrutia MD    sodium chloride 0.9 % flush 10 mL, 10 mL, Intravenous, PRN, Beni Urrutia MD    valACYclovir (VALTREX) tablet 500 mg, 500 mg, Oral, Q24H, Beni Urrutia MD    zinc sulfate (ZINCATE) capsule 220 mg, 220 mg, Oral, Daily, Beni Urrutia MD    Data:     Code Status:    There are no questions and answers to display.       Family History   Problem Relation Age of Onset    Cancer Mother     Heart disease Father        Social History     Socioeconomic History    Marital status:      Spouse name: Not on file    Number of children: Not on file    Years of education: Not on file    Highest education level: Not on file   Tobacco Use     Smoking status: Never Smoker    Smokeless tobacco: Never Used   Substance and Sexual Activity    Alcohol use: No    Drug use: Never    Sexual activity: Defer       Vitals:  There were no vitals taken for this visit.  T 97.3 P 80 R 18 /95 Sp02 97% (room air)    I/O (24Hr):  No intake or output data in the 24 hours ending 03/07/21 1532    Labs and imaging:      No results found for this or any previous visit (from the past 12 hour(s)).    Physical Examination:        Physical Exam  Vitals and nursing note reviewed.   Constitutional:       Appearance: Normal appearance.   HENT:      Head: Normocephalic and atraumatic.      Right Ear: External ear normal.      Left Ear: External ear normal.      Nose: Nose normal.      Mouth/Throat:      Mouth: Mucous membranes are moist.      Pharynx: Oropharynx is clear.   Eyes:      Extraocular Movements: Extraocular movements intact.      Pupils: Pupils are equal, round, and reactive to light.   Neck:      Comments: Cervical collar in place  Cardiovascular:      Rate and Rhythm: Normal rate and regular rhythm.      Pulses: Normal pulses.      Heart sounds: Normal heart sounds.   Pulmonary:      Effort: Pulmonary effort is normal.      Breath sounds: Normal breath sounds.   Abdominal:      General: Abdomen is flat. Bowel sounds are normal.      Palpations: Abdomen is soft.   Musculoskeletal:      Cervical back: Normal range of motion. Pain with movement present.      Comments: Generalized weakness  Limited ROM RLE   Skin:     General: Skin is warm and dry.      Capillary Refill: Capillary refill takes less than 2 seconds.      Comments: Wound vac intact   Neurological:      General: No focal deficit present.      Mental Status: She is alert and oriented to person, place, and time.   Psychiatric:         Mood and Affect: Mood normal.         Behavior: Behavior normal.         Thought Content: Thought content normal.           Assessment:             * No active hospital  problems. *    Past Medical History:   Diagnosis Date    Arthritis     Asthma     Chronic sinusitis     Coronary artery disease     Eustachian tube dysfunction     Heart disease     Herpes simplex     Hyperlipidemia     Hypertension     Knee dislocation     Labral tear of right hip joint     Laryngitis sicca     Laryngitis, chronic     MI (myocardial infarction) (CMS/[facility])     Otorrhea     Sensorineural hearing loss     Sjogren's disease (CMS/[facility])         Plan:        MSSA bacteremia  Torn gluteus muscle s/p repair  Post-operative MSSA wound infection  RA  Chronic immunosuppression  Multifactorial anemia  Hypothyroidism  GERD  Hx CAD  Herpes simplex on chronic suppressive therapy  HTN  Right psoas abscess  L1-L2 discitis osteomyelitis  Constipation  Cervical spinal stenosis, s/p C6 corpectomy, POD #2    Continue current treatment. Monitor counts. Increase activity as tolerated. Maintain patient safety.  Wound care per wound care team. Aggressive therapy as tolerated with activity/weightbearing restrictions per ortho recommendations.  Antibiotics under direction of ID. Continue pain control efforts and avoid oversedation. Continue bowel regimen.      Electronically signed by DANIELA Brink on 3/7/2021 at 15:32 CST     I have discussed the care of Tawny Shin, including pertinent history and exam findings, with the nurse practitioner.    I have seen and examined the patient and the key elements of all parts of the encounter have been performed by me.  I agree with the assessment, plan and orders as documented by DANIELA Morales, after I modified the exam findings and the plan of treatments and the final version is my approved version of the assessment.        Electronically signed by Beni Urrutia MD on 3/7/2021 at 20:26 CST

## 2021-03-07 NOTE — THERAPY DISCHARGE NOTE
Acute Care - Occupational Therapy Discharge Summary  [facility]     Patient Name: Tawny Shin  : 1955  MRN: [id number]    Today's Date: 3/7/2021                 Admit Date: 2021        OT Recommendation and Plan    Visit Dx:    ICD-10-CM ICD-9-CM   1. Stenosis of cervical spine with myelopathy (CMS/[facility])  M48.02 723.0    G99.2 336.3   2. Gait abnormality  R26.9 781.2   3. Spinal stenosis, lumbar region, with neurogenic claudication  M48.062 724.03   4. Discitis of lumbar region  M46.46 722.93   5. Osteomyelitis of lumbar spine (CMS/[facility])  M46.26 730.28   6. Staphylococcus aureus bacteremia  R78.81 790.7    B95.61 041.11   7. Impaired mobility and ADLs  Z74.09 V49.89    Z78.9    8. Open wound of right hip, subsequent encounter  S71.001D V58.89     890.0   9. Dysphagia, unspecified type  R13.10 787.20               Rehab Goal Summary     Row Name 21 1500 21 1254 21 0659       Physical Therapy Goals    Wound Care Goal Selection (PT)  --  --  wound care, PT goal 1;wound care, PT goal 2  -AB       Bed Mobility Goal 1 (PT)    Activity/Assistive Device (Bed Mobility Goal 1, PT)  --  --  sit to supine;supine to sit  -AB    [place] Level/Cues Needed (Bed Mobility Goal 1, PT)  --  --  independent  -AB    Time Frame (Bed Mobility Goal 1, PT)  --  --  long term goal (LTG);by discharge  -AB    Progress/Outcomes (Bed Mobility Goal 1, PT)  --  --  goal not met  -AB       Transfer Goal 1 (PT)    Activity/Assistive Device (Transfer Goal 1, PT)  --  --  sit-to-stand/stand-to-sit;bed-to-chair/chair-to-bed;walker, rolling  -AB    [place] Level/Cues Needed (Transfer Goal 1, PT)  --  --  modified independence  -AB    Time Frame (Transfer Goal 1, PT)  --  --  long term goal (LTG);by discharge  -AB    Progress/Outcome (Transfer Goal 1, PT)  --  --  goal not met  -AB       Gait Training Goal 1 (PT)    Activity/Assistive Device (Gait Training Goal 1, PT)  --  --  gait (walking locomotion);assistive  device use;decrease fall risk;improve balance and speed;increase endurance/gait distance;walker, rolling  -AB    [place] Level (Gait Training Goal 1, PT)  --  --  contact guard assist;tactile cues required;verbal cues required  -AB    Distance (Gait Training Goal 1, PT)  --  --  75ft maintaining upright posture and use of RW only for balance stability, not for weight bearing  -AB    Time Frame (Gait Training Goal 1, PT)  --  --  long term goal (LTG);by discharge  -AB    Progress/Outcome (Gait Training Goal 1, PT)  --  --  goal not met  -AB       Wound Care Goal 1 (PT)    Wound Care Goal 1 (PT)  --  --  R hip wound will decrease in length by 2 cm from 18cm to 16cm.  -AB    Time Frame (Wound Care Goal 1, PT)  --  --  long term goal (LTG);by discharge  -AB    Progress/Outcome (Wound Care Goal 1, PT)  --  --  goal not met  -AB       Wound Care Goal 2 (PT)    Wound Care Goal 2 (PT)  --  --  R hip wound will decrease in depth by 2 cm, from 12 cm to 10cm.   -AB    Time Frame (Wound Care Goal 2, PT)  --  --  long term goal (LTG);by discharge  -AB    Progress/Outcome (Wound Care Goal 2, PT)  --  --  goal not met  -AB       Transfer Goal 1 (OT)    Activity/Assistive Device (Transfer Goal 1, OT)  toilet;shower chair  -CH  --  --    [place] Level/Cues Needed (Transfer Goal 1, OT)  standby assist  -CH  --  --    Time Frame (Transfer Goal 1, OT)  long term goal (LTG);by discharge  -CH  --  --    Progress/Outcome (Transfer Goal 1, OT)  goal not met  -CH  --  --       Dressing Goal 1 (OT)    Activity/Device (Dressing Goal 1, OT)  lower body dressing  -CH  --  --    [place]/Cues Needed (Dressing Goal 1, OT)  minimum assist (75% or more patient effort)  -CH  --  --    Time Frame (Dressing Goal 1, OT)  long term goal (LTG);by discharge  -CH  --  --    Progress/Outcome (Dressing Goal 1, OT)  goal not met  -CH  --  --       Toileting Goal 1 (OT)    Activity/Device (Toileting Goal 1, OT)  toileting skills, all  -CH  --  --     [place] Level/Cues Needed (Toileting Goal 1, OT)  minimum assist (75% or more patient effort)  -CH  --  --    Time Frame (Toileting Goal 1, OT)  long term goal (LTG);by discharge  -CH  --  --    Progress/Outcome (Toileting Goal 1, OT)  goal not met  -CH  --  --       Swallow Goals (SLP)    Oral Nutrition/Hydration Goal Selection (SLP)  --  oral nutrition/hydration, SLP goal 1  -MM  --       Oral Nutrition/Hydration Goal 1 (SLP)    Oral Nutrition/Hydration Goal 1, SLP  --  Pt will tolerate least restrictive diet with no overt s/s of aspiration.   -MM  --    Time Frame (Oral Nutrition/Hydration Goal 1, SLP)  --  by discharge  -MM  --    Barriers (Oral Nutrition/Hydration Goal 1, SLP)  --  n/a  -MM  --    Progress/Outcomes (Oral Nutrition/Hydration Goal 1, SLP)  --  goal partially met;discharged from facility  -MM  --      User Key  (r) = Recorded By, (t) = Taken By, (c) = Cosigned By    Initials Name Provider Type Discipline    AB Kasie Perkins, PTA Physical Therapy Assistant PT    CH Radha Martinez, OTR/L Occupational Therapist OT    MM Nicole Acuña, MS CCC-SLP Speech and Language Pathologist SLP          Outcome Measures     Row Name 03/05/21 7745             How much help from another is currently needed...    Putting on and taking off regular lower body clothing?  2  -MT      Bathing (including washing, rinsing, and drying)  2  -MT      Toileting (which includes using toilet bed pan or urinal)  2  -MT      Putting on and taking off regular upper body clothing  3  -MT      Taking care of personal grooming (such as brushing teeth)  4  -MT      Eating meals  4  -MT      AM-PAC 6 Clicks Score (OT)  17  -MT        User Key  (r) = Recorded By, (t) = Taken By, (c) = Cosigned By    Initials Name Provider Type    MT Mirian Hanks COTA/L Occupational Therapy Assistant          Timed Therapy Charges  Total Units: 2    Charges  Total Units: 2    Procedure Name Documented Minutes Units Code    HC OT SELF  CARE/MGMT/TRAIN EA 15 MIN 24  2    37711 (CPT®)               Documented Minutes  Total Minutes: 24    Therapy Provided Minutes    66106 - OT Self Care/Mgmt Minutes 24                    OT Discharge Summary  Anticipated Discharge Disposition (OT): long-term acute care facility  Reason for Discharge: Discharge from facility  Outcomes Achieved: Refer to plan of care for updates on goals achieved  Discharge Destination: [facility]      Radha Martinez, OTR/L  3/7/2021

## 2021-03-07 NOTE — THERAPY DISCHARGE NOTE
Acute Care - Physical Therapy Discharge Summary  [facility]       Patient Name: Tawny Shin  : 1955  MRN: [id number]    Today's Date: 3/7/2021                 Admit Date: 2021      PT Recommendation and Plan    Visit Dx:    ICD-10-CM ICD-9-CM   1. Stenosis of cervical spine with myelopathy (CMS/[facility])  M48.02 723.0    G99.2 336.3   2. Gait abnormality  R26.9 781.2   3. Spinal stenosis, lumbar region, with neurogenic claudication  M48.062 724.03   4. Discitis of lumbar region  M46.46 722.93   5. Osteomyelitis of lumbar spine (CMS/[facility])  M46.26 730.28   6. Staphylococcus aureus bacteremia  R78.81 790.7    B95.61 041.11   7. Impaired mobility and ADLs  Z74.09 V49.89    Z78.9    8. Open wound of right hip, subsequent encounter  S71.001D V58.89     890.0   9. Dysphagia, unspecified type  R13.10 787.20       Outcome Measures     Row Name 21 1541             How much help from another is currently needed...    Putting on and taking off regular lower body clothing?  2  -MT      Bathing (including washing, rinsing, and drying)  2  -MT      Toileting (which includes using toilet bed pan or urinal)  2  -MT      Putting on and taking off regular upper body clothing  3  -MT      Taking care of personal grooming (such as brushing teeth)  4  -MT      Eating meals  4  -MT      AM-PAC 6 Clicks Score (OT)  17  -MT        User Key  (r) = Recorded By, (t) = Taken By, (c) = Cosigned By    Initials Name Provider Type    MT Mirian Hanks COTA/L Occupational Therapy Assistant              Rehab Goal Summary     Row Name 21 0659             Physical Therapy Goals    Wound Care Goal Selection (PT)  wound care, PT goal 1;wound care, PT goal 2  -AB         Bed Mobility Goal 1 (PT)    Activity/Assistive Device (Bed Mobility Goal 1, PT)  sit to supine;supine to sit  -AB      [place] Level/Cues Needed (Bed Mobility Goal 1, PT)  independent  -AB      Time Frame (Bed Mobility Goal 1, PT)  long term goal (LTG);by  discharge  -AB      Progress/Outcomes (Bed Mobility Goal 1, PT)  goal not met  -AB         Transfer Goal 1 (PT)    Activity/Assistive Device (Transfer Goal 1, PT)  sit-to-stand/stand-to-sit;bed-to-chair/chair-to-bed;walker, rolling  -AB      [place] Level/Cues Needed (Transfer Goal 1, PT)  modified independence  -AB      Time Frame (Transfer Goal 1, PT)  long term goal (LTG);by discharge  -AB      Progress/Outcome (Transfer Goal 1, PT)  goal not met  -AB         Gait Training Goal 1 (PT)    Activity/Assistive Device (Gait Training Goal 1, PT)  gait (walking locomotion);assistive device use;decrease fall risk;improve balance and speed;increase endurance/gait distance;walker, rolling  -AB      [place] Level (Gait Training Goal 1, PT)  contact guard assist;tactile cues required;verbal cues required  -AB      Distance (Gait Training Goal 1, PT)  75ft maintaining upright posture and use of RW only for balance stability, not for weight bearing  -AB      Time Frame (Gait Training Goal 1, PT)  long term goal (LTG);by discharge  -AB      Progress/Outcome (Gait Training Goal 1, PT)  goal not met  -AB         Wound Care Goal 1 (PT)    Wound Care Goal 1 (PT)  R hip wound will decrease in length by 2 cm from 18cm to 16cm.  -AB      Time Frame (Wound Care Goal 1, PT)  long term goal (LTG);by discharge  -AB      Progress/Outcome (Wound Care Goal 1, PT)  goal not met  -AB         Wound Care Goal 2 (PT)    Wound Care Goal 2 (PT)  R hip wound will decrease in depth by 2 cm, from 12 cm to 10cm.   -AB      Time Frame (Wound Care Goal 2, PT)  long term goal (LTG);by discharge  -AB      Progress/Outcome (Wound Care Goal 2, PT)  goal not met  -AB        User Key  (r) = Recorded By, (t) = Taken By, (c) = Cosigned By    Initials Name Provider Type Discipline    Kasie Carlos, PTA Physical Therapy Assistant PT              PT Discharge Summary  Anticipated Discharge Disposition (PT): home with assist, home with home  health  Reason for Discharge: Discharge from facility  Outcomes Achieved: Refer to plan of care for updates on goals achieved  Discharge Destination: [facility]      Kasie Perkins, PTA   3/7/2021

## 2021-03-08 LAB
ANION GAP SERPL CALCULATED.3IONS-SCNC: 9 MMOL/L (ref 5–15)
BASOPHILS # BLD AUTO: 0.03 10*3/MM3 (ref 0–0.2)
BASOPHILS NFR BLD AUTO: 0.4 % (ref 0–1.5)
BUN SERPL-MCNC: 7 MG/DL (ref 8–23)
BUN/CREAT SERPL: 18.9 (ref 7–25)
CALCIUM SPEC-SCNC: 8.6 MG/DL (ref 8.6–10.5)
CHLORIDE SERPL-SCNC: 103 MMOL/L (ref 98–107)
CO2 SERPL-SCNC: 27 MMOL/L (ref 22–29)
CREAT SERPL-MCNC: 0.37 MG/DL (ref 0.57–1)
DEPRECATED RDW RBC AUTO: 51.2 FL (ref 37–54)
EOSINOPHIL # BLD AUTO: 0.14 10*3/MM3 (ref 0–0.4)
EOSINOPHIL NFR BLD AUTO: 1.9 % (ref 0.3–6.2)
ERYTHROCYTE [DISTWIDTH] IN BLOOD BY AUTOMATED COUNT: 15.6 % (ref 12.3–15.4)
GFR SERPL CREATININE-BSD FRML MDRD: >150 ML/MIN/1.73
GLUCOSE SERPL-MCNC: 131 MG/DL (ref 65–99)
HCT VFR BLD AUTO: 30.3 % (ref 34–46.6)
HGB BLD-MCNC: 9.5 G/DL (ref 12–15.9)
IMM GRANULOCYTES # BLD AUTO: 0.07 10*3/MM3 (ref 0–0.05)
IMM GRANULOCYTES NFR BLD AUTO: 1 % (ref 0–0.5)
LYMPHOCYTES # BLD AUTO: 0.82 10*3/MM3 (ref 0.7–3.1)
LYMPHOCYTES NFR BLD AUTO: 11.2 % (ref 19.6–45.3)
MCH RBC QN AUTO: 28.6 PG (ref 26.6–33)
MCHC RBC AUTO-ENTMCNC: 31.4 G/DL (ref 31.5–35.7)
MCV RBC AUTO: 91.3 FL (ref 79–97)
MONOCYTES # BLD AUTO: 0.84 10*3/MM3 (ref 0.1–0.9)
MONOCYTES NFR BLD AUTO: 11.5 % (ref 5–12)
NEUTROPHILS NFR BLD AUTO: 5.42 10*3/MM3 (ref 1.7–7)
NEUTROPHILS NFR BLD AUTO: 74 % (ref 42.7–76)
NRBC BLD AUTO-RTO: 0 /100 WBC (ref 0–0.2)
PLATELET # BLD AUTO: 197 10*3/MM3 (ref 140–450)
PMV BLD AUTO: 10.2 FL (ref 6–12)
POTASSIUM SERPL-SCNC: 3.8 MMOL/L (ref 3.5–5.2)
RBC # BLD AUTO: 3.32 10*6/MM3 (ref 3.77–5.28)
SODIUM SERPL-SCNC: 139 MMOL/L (ref 136–145)
WBC # BLD AUTO: 7.32 10*3/MM3 (ref 3.4–10.8)

## 2021-03-08 PROCEDURE — 80048 BASIC METABOLIC PNL TOTAL CA: CPT | Performed by: INTERNAL MEDICINE

## 2021-03-08 PROCEDURE — 63710000001 PREDNISONE PER 5 MG: Performed by: INTERNAL MEDICINE

## 2021-03-08 PROCEDURE — 99024 POSTOP FOLLOW-UP VISIT: CPT | Performed by: NURSE PRACTITIONER

## 2021-03-08 PROCEDURE — 97530 THERAPEUTIC ACTIVITIES: CPT | Performed by: PHYSICAL THERAPIST

## 2021-03-08 PROCEDURE — 85025 COMPLETE CBC W/AUTO DIFF WBC: CPT | Performed by: INTERNAL MEDICINE

## 2021-03-08 PROCEDURE — 25010000002 HEPARIN (PORCINE) PER 1000 UNITS: Performed by: INTERNAL MEDICINE

## 2021-03-08 PROCEDURE — 97606 NEG PRS WND THER DME>50 SQCM: CPT | Performed by: PHYSICAL THERAPIST

## 2021-03-08 PROCEDURE — 97535 SELF CARE MNGMENT TRAINING: CPT | Performed by: OCCUPATIONAL THERAPIST

## 2021-03-08 PROCEDURE — 97164 PT RE-EVAL EST PLAN CARE: CPT | Performed by: PHYSICAL THERAPIST

## 2021-03-08 PROCEDURE — 25010000002 CEFAZOLIN PER 500 MG: Performed by: INTERNAL MEDICINE

## 2021-03-08 PROCEDURE — 97168 OT RE-EVAL EST PLAN CARE: CPT | Performed by: OCCUPATIONAL THERAPIST

## 2021-03-08 NOTE — PROGRESS NOTES
NEUROSURGERY DAILY PROGRESS NOTE    ASSESSMENT:   Tawny Shin is a 65 y.o. with a significant comorbidity rheumatoid arthritis on disease modifying agents, prior lumbar fusion by Dr. Oropeza in 2018.  She presents with a new problem of postop surgical infection of her right hip with wound VAC dressing, progressive right lower extremity proximal weakness without numbness or radiculopathy. Physical exam findings of right iliopsoas weakness, and global hyperreflexia with Babinski and right Patience's and decreased  strength bilaterally.  Their imaging shows CT of the cervical spine with severe cervical stenosis at severe stenosis C5-6 and C6/7 and adjacent segment disease at L1/2 and L2/3 with concern for osteomyelitis discitis.    Past Medical History:   Diagnosis Date   • Arthritis    • Asthma    • Chronic sinusitis    • Coronary artery disease    • Eustachian tube dysfunction    • Heart disease    • Herpes simplex    • Hyperlipidemia    • Hypertension    • Knee dislocation    • Labral tear of right hip joint    • Laryngitis sicca    • Laryngitis, chronic    • MI (myocardial infarction) (CMS/HCC)    • Otorrhea    • Sensorineural hearing loss    • Sjogren's disease (CMS/HCC)      There are no active hospital problems to display for this patient.    PLAN:   Neuro: Stable.  Improved  strength.  Improved right lower extremity strength     Cervical stenosis    POD #5 C6 corpectomy   GUILLERMO DC'd   Cervical collar in place.  Trachea midline.  No difficulty with swallowing   Postop x-rays stable   Continue neurochecks every 4 hours   Ready to discharge from a neurosurgical perspective   Neurosurgery will continue to follow     Right psoas abscess   I&D right psoas abscess performed per IR (3/2/2021)   Body fluid culture: 4+ WBCs, rare 1+ gram-positive cocci   Light growth 2+ Staph aureus   Continue antibiotics per ID    Concern for discitis/osteomyelitis L1/2  L1/2 severe central canal stenosis  Severe stenosis from  adjacent segment disease   We will continue to monitor for now   May require L1 laminectomy with possible discectomy, foraminotomy, and debridement in the near future   Continue antibiotics per ID     CV: JADEN.  VS WNL  Pulm: No acute issues.  Maintaining O2 sat.  Continuous pulse oximetry   : Voiding spontaneously.  Bladder scans and I/O cath per policy  FEN: Cleared for oral diet.  Meds in apple sauce.   GI: Prophylaxis  ORTHO: Open wound right posterior hip.  MSSA infection, wound vac per PT   3/4/2021: Scant growth Staphylococcus aureus        Scant growth Enterococcus     ID: MSSA bacteria and Enterococcus.  Postop vancomycin and cefazolin   Then continue cafazolin   CRP continues to fall.   ORTHO: Heme:  DVT prophylaxis; SCD's  Pain: Tolerable at present, DC PCA and switch to oral meds   Dispo: PT/OT.      Max ambulation allowable by Ortho.    CHIEF COMPLAINT:   Right-sided lumbar back pain with a new complaint of right anterior thigh pain    Subjective  Symptom stable.       Objective:  General Appearance:  Comfortable, well-appearing and in no acute distress.    Vital signs: (most recent): Weight 108 kg (239 lb), not currently breastfeeding.      Neurologic Exam     Mental Status   Oriented to person, place, and time.   Attention: normal. Concentration: normal.   Speech: speech is normal   Level of consciousness: alert    Bright and awake.  Oriented x3.  Follows commands without prompting showing thumbs up into fingers bilaterally.     Cranial Nerves     CN II   Visual fields full to confrontation.     CN III, IV, VI   Pupils are equal, round, and reactive to light.  Extraocular motions are normal.     CN V   Facial sensation intact.     CN VII   Facial expression full, symmetric.     CN VIII   CN VIII normal.     CN IX, X   CN IX normal.     CN XI   CN XI normal.     Motor Exam     Strength   Right deltoid: 5/5  Left deltoid: 5/5  Right biceps: 5/5  Left biceps: 5/5  Right triceps: 5/5  Left triceps:  5/5  Right wrist extension: 5/5  Left wrist extension: 5/5  Right interossei: 4/5  Left interossei: 4/5  Right iliopsoas: 2/5  Left iliopsoas: 5/5  Right quadriceps: 2/5  Left quadriceps: 5/5  Right anterior tibial: 3/5  Left anterior tibial: 5/5  Right gastroc: 4/5  Left gastroc: 5/5       Sensory Exam   Sensory deficit distribution on right: L1    Gait, Coordination, and Reflexes     Tremor   Resting tremor: absent  Intention tremor: absent  Action tremor: absent    Reflexes   Right plantar: upgoing  Left plantar: normal  Right Mims: present  Left Mims: absent  Right ankle clonus: present  Left ankle clonus: present  Right pendular knee jerk: absent  Left pendular knee jerk: absent    Incision: C, D, I    Drains: * No LDAs found *    Imaging Results (Last 24 Hours)     ** No results found for the last 24 hours. **        Lab Results (last 24 hours)     Procedure Component Value Units Date/Time    Basic Metabolic Panel [[id number]]  (Abnormal) Collected: 03/08/21 1122    Specimen: Blood Updated: 03/08/21 1200     Glucose 131 mg/dL      BUN 7 mg/dL      Creatinine 0.37 mg/dL      Sodium 139 mmol/L      Potassium 3.8 mmol/L      Comment: Slight hemolysis detected by analyzer. Results may be affected.        Chloride 103 mmol/L      CO2 27.0 mmol/L      Calcium 8.6 mg/dL      eGFR Non African Amer >150 mL/min/1.73      BUN/Creatinine Ratio 18.9     Anion Gap 9.0 mmol/L     Narrative:      GFR Normal >60  Chronic Kidney Disease <60  Kidney Failure <15      CBC & Differential [[id number]]  (Abnormal) Collected: 03/08/21 1122    Specimen: Blood Updated: 03/08/21 1134    Narrative:      The following orders were created for panel order CBC & Differential.  Procedure                               Abnormality         Status                     ---------                               -----------         ------                     CBC Auto Differential[[id number]]        Abnormal            Final result                  Please view results for these tests on the individual orders.    CBC Auto Differential [[id number]]  (Abnormal) Collected: 03/08/21 1122    Specimen: Blood Updated: 03/08/21 1134     WBC 7.32 10*3/mm3      RBC 3.32 10*6/mm3      Hemoglobin 9.5 g/dL      Hematocrit 30.3 %      MCV 91.3 fL      MCH 28.6 pg      MCHC 31.4 g/dL      RDW 15.6 %      RDW-SD 51.2 fl      MPV 10.2 fL      Platelets 197 10*3/mm3      Neutrophil % 74.0 %      Lymphocyte % 11.2 %      Monocyte % 11.5 %      Eosinophil % 1.9 %      Basophil % 0.4 %      Immature Grans % 1.0 %      Neutrophils, Absolute 5.42 10*3/mm3      Lymphocytes, Absolute 0.82 10*3/mm3      Monocytes, Absolute 0.84 10*3/mm3      Eosinophils, Absolute 0.14 10*3/mm3      Basophils, Absolute 0.03 10*3/mm3      Immature Grans, Absolute 0.07 10*3/mm3      nRBC 0.0 /100 WBC         Taiwo Prado, APRN

## 2021-03-08 NOTE — PROGRESS NOTES
LTACH Fall Assessment Note    Tawny Shin is a 65 y.o.female  [Ht:  ; Wt: 108 kg (239 lb)] admitted 3/6/2021 11:36 AM.    Current medications associated with an increased risk for fall include:  Carvedilol  Gabapentin  Isosorbide mononitrate  Clonidine  Cyclobenzaprine  Diazepam  Labetalol  Ondansetron  Angie Bee [facility]  03/08/2115:46 CST

## 2021-03-08 NOTE — PROGRESS NOTES
ARGELIA Guerrero APRN        Internal Medicine Progress Note    3/8/2021   10:32 CST    Name:  Tawny Shin  MRN:    [id number]     Acct:     [id number]   Room:  [address] [place] [address] Day: 0     Admit Date: 3/6/2021 11:36 AM  PCP: Davon Urrutia MD    Subjective:     C/C: Wound care and rehabilitation efforts.     Interval History: Status: stable. Up to chair.   Family/visitor at bedside.  Afebrile. C/o increased low back pain. Progressing with therapy. Anticipating wound care/wound vac change later today. Blood pressure remains elevated.     Review of Systems   Constitutional: Positive for malaise/fatigue. Negative for chills and fever.   HENT: Negative for congestion, ear discharge and sore throat.         Cervical collar in place   Eyes: Negative for blurred vision, double vision and pain.   Cardiovascular: Negative for chest pain, claudication and dyspnea on exertion.   Respiratory: Negative for cough, hemoptysis and shortness of breath.    Endocrine: Positive for heat intolerance. Negative for cold intolerance.   Hematologic/Lymphatic: Negative for adenopathy and bleeding problem.   Skin: Positive for poor wound healing. Negative for itching and rash.   Musculoskeletal: Positive for back pain, falls and joint pain.   Gastrointestinal: Positive for constipation. Negative for abdominal pain, diarrhea, nausea and vomiting.   Genitourinary: Negative for bladder incontinence, flank pain and frequency.   Neurological: Positive for weakness. Negative for difficulty with concentration.   Psychiatric/Behavioral: Negative for altered mental status, depression and suicidal ideas.   Allergic/Immunologic: Negative for environmental allergies, HIV exposure and hives.         Medications:     Allergies:   Allergies   Allergen Reactions   • Atorvastatin Other (See Comments)     LEG CRAMPS     • Amoxicillin Rash   • Escitalopram Rash   • Nabumetone Rash   • Niacin Er Rash   • Penicillins Rash    • Simvastatin Rash       Current Meds:   Current Facility-Administered Medications:   •  acetaminophen (TYLENOL) tablet 650 mg, 650 mg, Oral, Q4H PRN **OR** acetaminophen (TYLENOL) suppository 650 mg, 650 mg, Rectal, Q4H PRN, Beni Urrutia MD  •  ascorbic acid (VITAMIN C) tablet 500 mg, 500 mg, Oral, BID, Beni Urrutia MD  •  bisacodyl (DULCOLAX) suppository 10 mg, 10 mg, Rectal, Daily PRN, Beni Urrutia MD  •  budesonide (PULMICORT) nebulizer solution 0.5 mg, 0.5 mg, Nebulization, BID PRN, Beni Urrutia MD  •  carvedilol (COREG) tablet 25 mg, 25 mg, Oral, BID With Meals, Beni Urrutia MD  •  ceFAZolin in 0.9% normal saline (ANCEF) IVPB solution 2 g, 2 g, Intravenous, Q8H, Beni Urrutia MD  •  cloNIDine (CATAPRES) tablet 0.1 mg, 0.1 mg, Oral, BID PRN, Beni Urrutia MD  •  cyclobenzaprine (FLEXERIL) tablet 10 mg, 10 mg, Oral, TID PRN, Beni Urrutia MD  •  diazePAM (VALIUM) tablet 2 mg, 2 mg, Oral, Q6H PRN, Beni Urrutia MD  •  docusate sodium (COLACE) capsule 100 mg, 100 mg, Oral, BID, Beni Urrutia MD  •  folic acid (FOLVITE) tablet 1 mg, 1 mg, Oral, Daily, Beni Urrutia MD  •  gabapentin (NEURONTIN) capsule 100 mg, 100 mg, Oral, Q12H, Beni Urrutia MD  •  heparin (porcine) 5000 UNIT/ML injection 5,000 Units, 5,000 Units, Subcutaneous, Q12H, Beni Urrutia MD  •  isosorbide mononitrate (IMDUR) 24 hr tablet 60 mg, 60 mg, Oral, BID - Nitrates, Beni Urrutia MD  •  labetalol (NORMODYNE,TRANDATE) injection 20 mg, 20 mg, Intravenous, Q10 Min PRN, Beni Urrutia MD  •  levothyroxine (SYNTHROID, LEVOTHROID) tablet 75 mcg, 75 mcg, Oral, Q AM, Beni Urrutia MD  •  lidocaine (LIDODERM) 5 % 1 patch, 1 patch, Transdermal, Daily PRN, Beni Urrutia MD  •  magnesium hydroxide (MILK OF MAGNESIA) suspension 2400 mg/10mL 10 mL, 10 mL, Oral, Daily PRN, Beni Urrutia MD  •   multivitamin with minerals 1 tablet, 1 tablet, Oral, Daily, Beni Urrutia MD  •  naloxone (NARCAN) injection 0.1 mg, 0.1 mg, Intravenous, Q5 Min PRN, Beni Urrutia MD  •  nitroglycerin (NITROSTAT) SL tablet 0.4 mg, 0.4 mg, Sublingual, Q5 Min PRN, Beni Urrutia MD  •  ondansetron (ZOFRAN) tablet 4 mg, 4 mg, Oral, Q6H PRN **OR** ondansetron (ZOFRAN) injection 4 mg, 4 mg, Intravenous, Q6H PRN, Beni Urrutia MD  •  oxyCODONE-acetaminophen (PERCOCET)  MG per tablet 1 tablet, 1 tablet, Oral, Q4H PRN, Beni Urrutia MD  •  oxyCODONE-acetaminophen (PERCOCET) 7.5-325 MG per tablet 1 tablet, 1 tablet, Oral, Q4H PRN, Beni Urrutia MD  •  pantoprazole (PROTONIX) EC tablet 40 mg, 40 mg, Oral, QAM AC, Beni Urrutia MD  •  polyethylene glycol (MIRALAX) packet 17 g, 17 g, Oral, Daily, Beni Urrutia MD  •  predniSONE (DELTASONE) tablet 5 mg, 5 mg, Oral, Daily With Breakfast, Beni Urrutia MD  •  saccharomyces boulardii (FLORASTOR) capsule 250 mg, 250 mg, Oral, BID, Beni Urrutia MD  •  sennosides-docusate (PERICOLACE) 8.6-50 MG per tablet 2 tablet, 2 tablet, Oral, BID, Beni Urrutia MD  •  sodium chloride 0.9 % flush 10 mL, 10 mL, Intravenous, Q12H, Beni Urrutia MD  •  sodium chloride 0.9 % flush 10 mL, 10 mL, Intravenous, PRN, Beni Urrutia MD  •  valACYclovir (VALTREX) tablet 500 mg, 500 mg, Oral, Q24H, Beni Urrutia MD  •  zinc sulfate (ZINCATE) capsule 220 mg, 220 mg, Oral, Daily, Beni Urrutia MD    Data:     Code Status:    There are no questions and answers to display.       Family History   Problem Relation Age of Onset   • Cancer Mother    • Heart disease Father        Social History     Socioeconomic History   • Marital status:      Spouse name: Not on file   • Number of children: Not on file   • Years of education: Not on file   • Highest education level: Not on file   Tobacco  Use   • Smoking status: Never Smoker   • Smokeless tobacco: Never Used   Substance and Sexual Activity   • Alcohol use: No   • Drug use: Never   • Sexual activity: Defer       Vitals:  Wt 108 kg (239 lb)   BMI 39.77 kg/m²   T 97.9 P 80 R 20 /70 Sp02 94% (room air)    I/O (24Hr):  No intake or output data in the 24 hours ending 03/08/21 1032    Labs and imaging:      No results found for this or any previous visit (from the past 12 hour(s)).    Physical Examination:        Physical Exam  Vitals and nursing note reviewed.   Constitutional:       Appearance: Normal appearance.   HENT:      Head: Normocephalic and atraumatic.      Right Ear: External ear normal.      Left Ear: External ear normal.      Nose: Nose normal.      Mouth/Throat:      Mouth: Mucous membranes are moist.      Pharynx: Oropharynx is clear.   Eyes:      Extraocular Movements: Extraocular movements intact.      Pupils: Pupils are equal, round, and reactive to light.   Neck:      Comments: Cervical collar in place  Cardiovascular:      Rate and Rhythm: Normal rate and regular rhythm.      Pulses: Normal pulses.      Heart sounds: Normal heart sounds.   Pulmonary:      Effort: Pulmonary effort is normal.      Breath sounds: Normal breath sounds.   Abdominal:      General: Abdomen is flat. Bowel sounds are normal.      Palpations: Abdomen is soft.   Musculoskeletal:      Cervical back: Normal range of motion. Pain with movement present.      Comments: Generalized weakness  Limited ROM RLE   Skin:     General: Skin is warm and dry.      Capillary Refill: Capillary refill takes less than 2 seconds.      Comments: Wound vac intact   Neurological:      General: No focal deficit present.      Mental Status: She is alert and oriented to person, place, and time.   Psychiatric:         Mood and Affect: Mood normal.         Behavior: Behavior normal.         Thought Content: Thought content normal.           Assessment:             * No active hospital  problems. *    Past Medical History:   Diagnosis Date   • Arthritis    • Asthma    • Chronic sinusitis    • Coronary artery disease    • Eustachian tube dysfunction    • Heart disease    • Herpes simplex    • Hyperlipidemia    • Hypertension    • Knee dislocation    • Labral tear of right hip joint    • Laryngitis sicca    • Laryngitis, chronic    • MI (myocardial infarction) (CMS/HCC)    • Otorrhea    • Sensorineural hearing loss    • Sjogren's disease (CMS/HCC)         Plan:        1. MSSA bacteremia  2. Torn gluteus muscle s/p repair  3. Post-operative MSSA wound infection  4. Cervical spinal stenosis s/p corpectomy and decompression  5. RA  6. Chronic immunosuppression  7. Multifactorial anemia  8. Hypothyroidism  9. GERD  10. Hx CAD  11. Herpes simplex on chronic suppressive therapy  12. HTN  13. Right psoas abscess  14. L1-L2 discitis osteomyelitis  15. Constipation      Continue current treatment. Monitor counts. Increase activity as tolerated. Maintain patient safety.  Wound care per wound care team. Aggressive therapy as tolerated with activity/weightbearing restrictions per ortho recommendations.  Antibiotics under direction of ID. Continue pain control efforts and avoid oversedation. Continue bowel regimen.  Labs in am.     Electronically signed by DANIELA Hill on 3/8/2021 at 10:32 CST     I have discussed the care of Tawny Shin, including pertinent history and exam findings, with the nurse practitioner.    I have seen and examined the patient and the key elements of all parts of the encounter have been performed by me.  I agree with the assessment, plan and orders as documented by DANIELA Kyle, after I modified the exam findings and the plan of treatments and the final version is my approved version of the assessment.        Electronically signed by Beni Urrutia MD on 3/8/2021 at 20:44 CST

## 2021-03-08 NOTE — PROGRESS NOTES
Infectious Diseases Progress Note    Patient:  Tawny Shin  YOB: 1955  MRN: [id number]   Admit date: 3/6/2021   Admitting Physician: Beni Urrutia MD  Primary Care Physician: Davon Urrutia MD    Chief Complaint/Interval History: She is without fever.  No new symptoms.  No pain at peripheral IV site.  Working with therapy.  She has had some low back pain.  Does not describe any increasing weakness or numbness.  Overall stable to improved.  No side effects of her cefazolin treatment.  Spoke with microbiology.  Still no susceptibility from staph aureus recovered from psoas abscess drainage.  They made contact with the main lab in [place].  They confirmed with me the susceptibility testing is being completed but not yet finished.  My impression is that they just started the susceptibility testing today on that isolate.  I had requested susceptibility testing on Wednesday or Thursday of last week.    No intake or output data in the 24 hours ending 03/08/21 1227  Allergies:   Allergies   Allergen Reactions   • Atorvastatin Other (See Comments)     LEG CRAMPS     • Amoxicillin Rash   • Escitalopram Rash   • Nabumetone Rash   • Niacin Er Rash   • Penicillins Rash   • Simvastatin Rash     Current Scheduled Medications:   ascorbic acid, 500 mg, Oral, BID  carvedilol, 25 mg, Oral, BID With Meals  ceFAZolin, 2 g, Intravenous, Q8H  docusate sodium, 100 mg, Oral, BID  folic acid, 1 mg, Oral, Daily  gabapentin, 100 mg, Oral, Q12H  heparin (porcine), 5,000 Units, Subcutaneous, Q12H  isosorbide mononitrate, 60 mg, Oral, BID - Nitrates  levothyroxine, 75 mcg, Oral, Q AM  multivitamin with minerals, 1 tablet, Oral, Daily  pantoprazole, 40 mg, Oral, QAM AC  polyethylene glycol, 17 g, Oral, Daily  predniSONE, 5 mg, Oral, Daily With Breakfast  saccharomyces boulardii, 250 mg, Oral, BID  senna-docusate sodium, 2 tablet, Oral, BID  sodium chloride, 10 mL, Intravenous, Q12H  valACYclovir, 500 mg, Oral,  Q24H  zinc sulfate, 220 mg, Oral, Daily      Current PRN Medications:  •  acetaminophen **OR** acetaminophen  •  bisacodyl  •  budesonide  •  cloNIDine  •  cyclobenzaprine  •  diazePAM  •  labetalol  •  lidocaine  •  magnesium hydroxide  •  naloxone  •  nitroglycerin  •  ondansetron **OR** ondansetron  •  oxyCODONE-acetaminophen  •  oxyCODONE-acetaminophen  •  sodium chloride    Review of Systems no cardiopulmonary symptoms.    Vital Signs:  Wt 108 kg (239 lb)   BMI 39.77 kg/m²     Physical Exam  Vital signs - reviewed.  Line/IV (right arm peripheral) site - No erythema, warmth, induration, or tenderness.  She is up to chair.  She is alert, pleasant, interactive, and in no distress.  No new findings on physical exam    Lab Results:  CBC:   Results from last 7 days   Lab Units 03/08/21  1122 03/05/21  0436 03/04/21  0315 03/03/21  0454 03/02/21  0331 03/01/21  1250   WBC 10*3/mm3 7.32 5.99 3.46 5.29 6.10  --    HEMOGLOBIN g/dL 9.5* 9.9* 9.5* 7.7* 7.8*  --    HEMATOCRIT % 30.3* 30.6* 29.1* 23.9* 24.8* 27.6*   PLATELETS 10*3/mm3 197 243 221 254 223 251     BMP:  Results from last 7 days   Lab Units 03/08/21  1122 03/05/21  0436 03/04/21  0315   SODIUM mmol/L 139 140 137   POTASSIUM mmol/L 3.8 4.4 4.4   CHLORIDE mmol/L 103 106 103   CO2 mmol/L 27.0 27.0 26.0   BUN mg/dL 7* 19 11   CREATININE mg/dL 0.37* 0.51* 0.35*   GLUCOSE mg/dL 131* 101* 145*   CALCIUM mg/dL 8.6 9.0 8.9     C-reactive protein on March 5 was 6.0 which was improved from 10.54 days prior    Culture Results:   Staph aureus recovered from psoas abscess drainage.  Susceptibility remains pending.    Radiology: None    Additional Studies Reviewed: None    Impression:   1.  MSSA bacteremia  2.  Postoperative infection right hip following gluteus muscle repair  3.  Right psoas abscess-staph aureus  4.  Discitis/osteomyelitis L1/L2  5.  Cervical stenosis-she has had surgical decompression  6.  Rheumatoid arthritis    Recommendations:   Does not appear to have  any antibiotic side effect at present  Renal function is stable  Continue treatment with cefazolin  No change in therapy at present  Continue physical therapy  Will see every 2 to 3 days  Call in interim if new problems or questions for infectious diseases    Elie Ohara MD

## 2021-03-09 LAB
BACTERIA FLD CULT: ABNORMAL
GRAM STN SPEC: ABNORMAL
GRAM STN SPEC: ABNORMAL

## 2021-03-09 PROCEDURE — 25010000002 CEFAZOLIN PER 500 MG: Performed by: INTERNAL MEDICINE

## 2021-03-09 PROCEDURE — 63710000001 ONDANSETRON PER 8 MG: Performed by: INTERNAL MEDICINE

## 2021-03-09 PROCEDURE — 97530 THERAPEUTIC ACTIVITIES: CPT

## 2021-03-09 PROCEDURE — 25010000002 HEPARIN (PORCINE) PER 1000 UNITS: Performed by: INTERNAL MEDICINE

## 2021-03-09 PROCEDURE — 99024 POSTOP FOLLOW-UP VISIT: CPT | Performed by: NURSE PRACTITIONER

## 2021-03-09 PROCEDURE — 25010000002 ONDANSETRON PER 1 MG: Performed by: INTERNAL MEDICINE

## 2021-03-09 PROCEDURE — 97535 SELF CARE MNGMENT TRAINING: CPT

## 2021-03-09 PROCEDURE — 97116 GAIT TRAINING THERAPY: CPT

## 2021-03-09 PROCEDURE — 63710000001 PREDNISONE PER 5 MG: Performed by: INTERNAL MEDICINE

## 2021-03-09 NOTE — PROGRESS NOTES
NEUROSURGERY DAILY PROGRESS NOTE    ASSESSMENT:   Tawny Shin is a 65 y.o. with a significant comorbidity rheumatoid arthritis on disease modifying agents, prior lumbar fusion by Dr. Oropeza in 2018.  She presents with a new problem of postop surgical infection of her right hip with wound VAC dressing, progressive right lower extremity proximal weakness without numbness or radiculopathy. Physical exam findings of right iliopsoas weakness, and global hyperreflexia with Babinski and right Patience's and decreased  strength bilaterally.  Their imaging shows CT of the cervical spine with severe cervical stenosis at severe stenosis C5-6 and C6/7 and adjacent segment disease at L1/2 and L2/3 with concern for osteomyelitis discitis.    Past Medical History:   Diagnosis Date   • Arthritis    • Asthma    • Chronic sinusitis    • Coronary artery disease    • Eustachian tube dysfunction    • Heart disease    • Herpes simplex    • Hyperlipidemia    • Hypertension    • Knee dislocation    • Labral tear of right hip joint    • Laryngitis sicca    • Laryngitis, chronic    • MI (myocardial infarction) (CMS/HCC)    • Otorrhea    • Sensorineural hearing loss    • Sjogren's disease (CMS/HCC)      There are no active hospital problems to display for this patient.    PLAN:   Neuro: Stable.  Improved  strength.  Improved right lower extremity strength     Cervical stenosis    POD #6 C6 corpectomy   GUILLERMO DC'd   Cervical collar in place.  Trachea midline.  No difficulty with swallowing   Postop x-rays stable   Continue neurochecks every 4 hours   Ready to discharge from a neurosurgical perspective   Neurosurgery will continue to follow     Right psoas abscess   I&D right psoas abscess performed per IR (3/2/2021)   Body fluid culture: 4+ WBCs, rare 1+ gram-positive cocci   Light growth 2+ Staph aureus   Continue antibiotics per ID    Concern for discitis/osteomyelitis L1/2  L1/2 severe central canal stenosis  Severe stenosis from  adjacent segment disease   We will continue to monitor for now   May require L1 laminectomy with possible discectomy, foraminotomy, and debridement in the near future   Continue antibiotics per ID     CV: JADEN.  VS WNL  Pulm: No acute issues.  Maintaining O2 sat.  Continuous pulse oximetry   : Voiding spontaneously.  Bladder scans and I/O cath per policy  FEN: Cleared for oral diet.  Meds in apple sauce.   GI: Prophylaxis  ORTHO: Open wound right posterior hip.  MSSA infection, wound vac per PT   3/4/2021: Scant growth Staphylococcus aureus        Scant growth Enterococcus     ID: MSSA bacteria and Enterococcus.  Postop vancomycin and cefazolin   Then continue cafazolin   CRP continues to fall.   ORTHO: Heme:  DVT prophylaxis; SCD's  Pain: Tolerable at present, DC PCA and switch to oral meds   Dispo: PT/OT.      Max ambulation allowable by Ortho.    CHIEF COMPLAINT:   Right-sided lumbar back pain with a new complaint of right anterior thigh pain    Subjective  Symptom stable.       Objective:  General Appearance:  Comfortable, well-appearing and in no acute distress.    Vital signs: (most recent): Weight 108 kg (239 lb), not currently breastfeeding.      Neurologic Exam     Mental Status   Oriented to person, place, and time.   Attention: normal. Concentration: normal.   Speech: speech is normal   Level of consciousness: alert    Bright and awake.  Oriented x3.  Follows commands without prompting showing thumbs up into fingers bilaterally.     Cranial Nerves     CN II   Visual fields full to confrontation.     CN III, IV, VI   Pupils are equal, round, and reactive to light.  Extraocular motions are normal.     CN V   Facial sensation intact.     CN VII   Facial expression full, symmetric.     CN VIII   CN VIII normal.     CN IX, X   CN IX normal.     CN XI   CN XI normal.     Motor Exam     Strength   Right deltoid: 5/5  Left deltoid: 5/5  Right biceps: 5/5  Left biceps: 5/5  Right triceps: 5/5  Left triceps:  5/5  Right wrist extension: 5/5  Left wrist extension: 5/5  Right interossei: 4/5  Left interossei: 4/5  Right iliopsoas: 2/5  Left iliopsoas: 5/5  Right quadriceps: 2/5  Left quadriceps: 5/5  Right anterior tibial: 3/5  Left anterior tibial: 5/5  Right gastroc: 4/5  Left gastroc: 5/5       Sensory Exam   Sensory deficit distribution on right: L1    Gait, Coordination, and Reflexes     Tremor   Resting tremor: absent  Intention tremor: absent  Action tremor: absent    Reflexes   Right plantar: upgoing  Left plantar: normal  Right Mims: present  Left Mims: absent  Right ankle clonus: present  Left ankle clonus: present  Right pendular knee jerk: absent  Left pendular knee jerk: absent    Incision: C, D, I    Drains: * No LDAs found *    Imaging Results (Last 24 Hours)     ** No results found for the last 24 hours. **        Lab Results (last 24 hours)     ** No results found for the last 24 hours. **        Taiwo Prado APRN

## 2021-03-09 NOTE — PROGRESS NOTES
ARGELIA Guerrero APRN        Internal Medicine Progress Note    3/9/2021   14:54 CST    Name:  Tawny Shin  MRN:    [id number]     Acct:     [id number]   Room:  [address] [place] [address] Day: 0     Admit Date: 3/6/2021 11:36 AM  PCP: Davon Urrutia MD    Subjective:     C/C: Wound care and rehabilitation efforts.     Interval History: Status: stable. Up to chair.  No family at bedside. Woke from sleep.  Low grade temp this morning. C/o increased low back pain. Progressing with therapy.  Blood pressure remains elevated.     Review of Systems   Constitutional: Positive for malaise/fatigue. Negative for chills and fever.   HENT: Negative for congestion, ear discharge and sore throat.         Cervical collar in place   Eyes: Negative for blurred vision, double vision and pain.   Cardiovascular: Negative for chest pain, claudication and dyspnea on exertion.   Respiratory: Negative for cough, hemoptysis and shortness of breath.    Endocrine: Positive for heat intolerance. Negative for cold intolerance.   Hematologic/Lymphatic: Negative for adenopathy and bleeding problem.   Skin: Positive for poor wound healing. Negative for itching and rash.   Musculoskeletal: Positive for back pain, falls and joint pain.   Gastrointestinal: Positive for constipation. Negative for abdominal pain, diarrhea, nausea and vomiting.   Genitourinary: Negative for bladder incontinence, flank pain and frequency.   Neurological: Positive for weakness. Negative for difficulty with concentration.   Psychiatric/Behavioral: Negative for altered mental status, depression and suicidal ideas.   Allergic/Immunologic: Negative for environmental allergies, HIV exposure and hives.         Medications:     Allergies:   Allergies   Allergen Reactions   • Atorvastatin Other (See Comments)     LEG CRAMPS     • Amoxicillin Rash   • Escitalopram Rash   • Nabumetone Rash   • Niacin Er Rash   • Penicillins Rash   • Simvastatin Rash        Current Meds:   Current Facility-Administered Medications:   •  acetaminophen (TYLENOL) tablet 650 mg, 650 mg, Oral, Q4H PRN **OR** acetaminophen (TYLENOL) suppository 650 mg, 650 mg, Rectal, Q4H PRN, Beni Urrutia MD  •  ascorbic acid (VITAMIN C) tablet 500 mg, 500 mg, Oral, BID, Beni Urrutia MD  •  bisacodyl (DULCOLAX) suppository 10 mg, 10 mg, Rectal, Daily PRN, Beni Urrutia MD  •  budesonide (PULMICORT) nebulizer solution 0.5 mg, 0.5 mg, Nebulization, BID PRN, Beni Urrutia MD  •  carvedilol (COREG) tablet 25 mg, 25 mg, Oral, BID With Meals, Beni Urrutia MD  •  ceFAZolin in 0.9% normal saline (ANCEF) IVPB solution 2 g, 2 g, Intravenous, Q8H, Beni Urrutia MD  •  cloNIDine (CATAPRES) tablet 0.1 mg, 0.1 mg, Oral, BID PRN, Beni Urrutia MD  •  cyclobenzaprine (FLEXERIL) tablet 10 mg, 10 mg, Oral, TID PRN, Beni Urrutia MD  •  diazePAM (VALIUM) tablet 2 mg, 2 mg, Oral, Q6H PRN, Beni Urrutia MD  •  docusate sodium (COLACE) capsule 100 mg, 100 mg, Oral, BID, Beni Urrutia MD  •  folic acid (FOLVITE) tablet 1 mg, 1 mg, Oral, Daily, Beni Urrutia MD  •  gabapentin (NEURONTIN) capsule 100 mg, 100 mg, Oral, Q12H, Beni Urrutia MD  •  heparin (porcine) 5000 UNIT/ML injection 5,000 Units, 5,000 Units, Subcutaneous, Q12H, Beni Urrutia MD  •  isosorbide mononitrate (IMDUR) 24 hr tablet 60 mg, 60 mg, Oral, BID - Nitrates, Beni Urrutia MD  •  labetalol (NORMODYNE,TRANDATE) injection 20 mg, 20 mg, Intravenous, Q10 Min PRN, Beni Urrutia MD  •  levothyroxine (SYNTHROID, LEVOTHROID) tablet 75 mcg, 75 mcg, Oral, Q AM, Beni Urrutia MD  •  lidocaine (LIDODERM) 5 % 1 patch, 1 patch, Transdermal, Daily PRN, Beni Urrutia MD  •  magnesium hydroxide (MILK OF MAGNESIA) suspension 2400 mg/10mL 10 mL, 10 mL, Oral, Daily PRN, Beni Urrutia MD  •  multivitamin with  minerals 1 tablet, 1 tablet, Oral, Daily, Beni Urrutia MD  •  naloxone (NARCAN) injection 0.1 mg, 0.1 mg, Intravenous, Q5 Min PRN, Beni Urrutia MD  •  nitroglycerin (NITROSTAT) SL tablet 0.4 mg, 0.4 mg, Sublingual, Q5 Min PRN, Beni Urrutia MD  •  ondansetron (ZOFRAN) tablet 4 mg, 4 mg, Oral, Q6H PRN **OR** ondansetron (ZOFRAN) injection 4 mg, 4 mg, Intravenous, Q6H PRN, Beni Urrutia MD  •  oxyCODONE-acetaminophen (PERCOCET)  MG per tablet 1 tablet, 1 tablet, Oral, Q4H PRN, Beni Urrutia MD  •  oxyCODONE-acetaminophen (PERCOCET) 7.5-325 MG per tablet 1 tablet, 1 tablet, Oral, Q4H PRN, Beni Urrutia MD  •  pantoprazole (PROTONIX) EC tablet 40 mg, 40 mg, Oral, QAM AC, Beni Urrutia MD  •  polyethylene glycol (MIRALAX) packet 17 g, 17 g, Oral, Daily, Beni Urrutia MD  •  predniSONE (DELTASONE) tablet 5 mg, 5 mg, Oral, Daily With Breakfast, Beni Urrutia MD  •  saccharomyces boulardii (FLORASTOR) capsule 250 mg, 250 mg, Oral, BID, Beni Urrutia MD  •  sennosides-docusate (PERICOLACE) 8.6-50 MG per tablet 2 tablet, 2 tablet, Oral, BID, Beni Urrutia MD  •  sodium chloride 0.9 % flush 10 mL, 10 mL, Intravenous, Q12H, Beni Urrutia MD  •  sodium chloride 0.9 % flush 10 mL, 10 mL, Intravenous, PRN, Beni Urrutia MD  •  valACYclovir (VALTREX) tablet 500 mg, 500 mg, Oral, Q24H, Beni Urrutia MD  •  zinc sulfate (ZINCATE) capsule 220 mg, 220 mg, Oral, Daily, Beni Urrutia MD    Data:     Code Status:    There are no questions and answers to display.       Family History   Problem Relation Age of Onset   • Cancer Mother    • Heart disease Father        Social History     Socioeconomic History   • Marital status:      Spouse name: Not on file   • Number of children: Not on file   • Years of education: Not on file   • Highest education level: Not on file   Tobacco Use   • Smoking  status: Never Smoker   • Smokeless tobacco: Never Used   Substance and Sexual Activity   • Alcohol use: No   • Drug use: Never   • Sexual activity: Defer       Vitals:  Wt 108 kg (239 lb)   BMI 39.77 kg/m²   T 99.1 P 87 R 20 /70 Sp02 93% (room air)    I/O (24Hr):  No intake or output data in the 24 hours ending 03/09/21 1454    Labs and imaging:      No results found for this or any previous visit (from the past 12 hour(s)).    Physical Examination:        Physical Exam  Vitals and nursing note reviewed.   Constitutional:       Appearance: Normal appearance.   HENT:      Head: Normocephalic and atraumatic.      Right Ear: External ear normal.      Left Ear: External ear normal.      Nose: Nose normal.      Mouth/Throat:      Mouth: Mucous membranes are moist.      Pharynx: Oropharynx is clear.   Eyes:      Extraocular Movements: Extraocular movements intact.      Pupils: Pupils are equal, round, and reactive to light.   Neck:      Comments: Cervical collar in place  Cardiovascular:      Rate and Rhythm: Normal rate and regular rhythm.      Pulses: Normal pulses.      Heart sounds: Normal heart sounds.   Pulmonary:      Effort: Pulmonary effort is normal.      Breath sounds: Normal breath sounds.   Abdominal:      General: Abdomen is flat. Bowel sounds are normal.      Palpations: Abdomen is soft.   Musculoskeletal:      Cervical back: Normal range of motion. Pain with movement present.      Comments: Generalized weakness  Limited ROM RLE   Skin:     General: Skin is warm and dry.      Capillary Refill: Capillary refill takes less than 2 seconds.      Comments: Wound vac intact   Neurological:      General: No focal deficit present.      Mental Status: She is alert and oriented to person, place, and time.   Psychiatric:         Mood and Affect: Mood normal.         Behavior: Behavior normal.         Thought Content: Thought content normal.           Assessment:             * No active hospital problems.  *    Past Medical History:   Diagnosis Date   • Arthritis    • Asthma    • Chronic sinusitis    • Coronary artery disease    • Eustachian tube dysfunction    • Heart disease    • Herpes simplex    • Hyperlipidemia    • Hypertension    • Knee dislocation    • Labral tear of right hip joint    • Laryngitis sicca    • Laryngitis, chronic    • MI (myocardial infarction) (CMS/HCC)    • Otorrhea    • Sensorineural hearing loss    • Sjogren's disease (CMS/HCC)         Plan:        1. MSSA bacteremia  2. Torn gluteus muscle s/p repair  3. Post-operative MSSA wound infection  4. Cervical spinal stenosis s/p corpectomy and decompression  5. RA  6. Chronic immunosuppression  7. Multifactorial anemia  8. Hypothyroidism  9. GERD  10. Hx CAD  11. Herpes simplex on chronic suppressive therapy  12. HTN  13. Right psoas abscess  14. L1-L2 discitis osteomyelitis  15. Constipation      Continue current treatment. Monitor counts. Increase activity as tolerated. Maintain patient safety.  Wound care per wound care team. Aggressive therapy as tolerated with activity/weightbearing restrictions per ortho recommendations.  Antibiotics under direction of ID. Continue pain control efforts and avoid oversedation. Continue bowel regimen.  Labs Thursday. Monitor temperature curve.      Electronically signed by DANIELA Hill on 3/9/2021 at 14:54 CST

## 2021-03-10 PROCEDURE — 97116 GAIT TRAINING THERAPY: CPT

## 2021-03-10 PROCEDURE — 25010000002 HEPARIN (PORCINE) PER 1000 UNITS: Performed by: INTERNAL MEDICINE

## 2021-03-10 PROCEDURE — 97606 NEG PRS WND THER DME>50 SQCM: CPT

## 2021-03-10 PROCEDURE — 99024 POSTOP FOLLOW-UP VISIT: CPT | Performed by: NURSE PRACTITIONER

## 2021-03-10 PROCEDURE — 63710000001 PREDNISONE PER 5 MG: Performed by: INTERNAL MEDICINE

## 2021-03-10 PROCEDURE — 97530 THERAPEUTIC ACTIVITIES: CPT

## 2021-03-10 PROCEDURE — 25010000002 HYDROMORPHONE 1 MG/ML SOLUTION: Performed by: INTERNAL MEDICINE

## 2021-03-10 PROCEDURE — 25010000002 CEFAZOLIN PER 500 MG: Performed by: INTERNAL MEDICINE

## 2021-03-10 RX ORDER — OXYCODONE AND ACETAMINOPHEN 10; 325 MG/1; MG/1
2 TABLET ORAL EVERY 8 HOURS
Status: DISPENSED | OUTPATIENT
Start: 2021-03-10 | End: 2021-03-19

## 2021-03-10 RX ORDER — CYCLOBENZAPRINE HCL 10 MG
10 TABLET ORAL 3 TIMES DAILY
Status: DISCONTINUED | OUTPATIENT
Start: 2021-03-10 | End: 2021-03-22 | Stop reason: HOSPADM

## 2021-03-10 NOTE — PROGRESS NOTES
ARGELIA Guerrero APRN        Internal Medicine Progress Note    3/10/2021   09:22 CST    Name:  Tawny Shin  MRN:    [id number]     Acct:     [id number]   Room:  [address] [place] [address] Day: 0     Admit Date: 3/6/2021 11:36 AM  PCP: Davon Urrutia MD    Subjective:     C/C: Wound care and rehabilitation efforts.     Interval History: Status: stable. Up to chair. No family at bedside. Ambulated in hallway with therapy. Low grade temp this morning. C/o increased low back pain. Tolerating treatment thus far. Progressing with therapy.   Review of Systems   Constitutional: Positive for malaise/fatigue. Negative for chills and fever.   HENT: Negative for congestion, ear discharge and sore throat.         Cervical collar in place   Eyes: Negative for blurred vision, double vision and pain.   Cardiovascular: Negative for chest pain, claudication and dyspnea on exertion.   Respiratory: Negative for cough, hemoptysis and shortness of breath.    Endocrine: Positive for heat intolerance. Negative for cold intolerance.   Hematologic/Lymphatic: Negative for adenopathy and bleeding problem.   Skin: Positive for poor wound healing. Negative for itching and rash.   Musculoskeletal: Positive for back pain, falls and joint pain.   Gastrointestinal: Positive for constipation. Negative for abdominal pain, diarrhea, nausea and vomiting.   Genitourinary: Negative for bladder incontinence, flank pain and frequency.   Neurological: Positive for weakness. Negative for difficulty with concentration.   Psychiatric/Behavioral: Negative for altered mental status, depression and suicidal ideas.   Allergic/Immunologic: Negative for environmental allergies, HIV exposure and hives.         Medications:     Allergies:   Allergies   Allergen Reactions   • Atorvastatin Other (See Comments)     LEG CRAMPS     • Amoxicillin Rash   • Escitalopram Rash   • Nabumetone Rash   • Niacin Er Rash   • Penicillins Rash   •  Simvastatin Rash       Current Meds:   Current Facility-Administered Medications:   •  acetaminophen (TYLENOL) tablet 650 mg, 650 mg, Oral, Q4H PRN **OR** acetaminophen (TYLENOL) suppository 650 mg, 650 mg, Rectal, Q4H PRN, Beni Urrutia MD  •  ascorbic acid (VITAMIN C) tablet 500 mg, 500 mg, Oral, BID, Beni Urrutia MD  •  bisacodyl (DULCOLAX) suppository 10 mg, 10 mg, Rectal, Daily PRN, Beni Urrutia MD  •  budesonide (PULMICORT) nebulizer solution 0.5 mg, 0.5 mg, Nebulization, BID PRN, Beni Urrtuia MD  •  carvedilol (COREG) tablet 25 mg, 25 mg, Oral, BID With Meals, Beni Urrutia MD  •  ceFAZolin in 0.9% normal saline (ANCEF) IVPB solution 2 g, 2 g, Intravenous, Q8H, Beni Urrutia MD  •  cloNIDine (CATAPRES) tablet 0.1 mg, 0.1 mg, Oral, BID PRN, Beni Urrutia MD  •  cyclobenzaprine (FLEXERIL) tablet 10 mg, 10 mg, Oral, TID PRN, Beni Urrutia MD  •  diazePAM (VALIUM) tablet 2 mg, 2 mg, Oral, Q6H PRN, Beni Urrutia MD  •  docusate sodium (COLACE) capsule 100 mg, 100 mg, Oral, BID, Beni Urrutia MD  •  folic acid (FOLVITE) tablet 1 mg, 1 mg, Oral, Daily, Beni Urrutia MD  •  gabapentin (NEURONTIN) capsule 100 mg, 100 mg, Oral, Q12H, Beni Urrutia MD  •  heparin (porcine) 5000 UNIT/ML injection 5,000 Units, 5,000 Units, Subcutaneous, Q12H, Beni Urrutia MD  •  isosorbide mononitrate (IMDUR) 24 hr tablet 60 mg, 60 mg, Oral, BID - Nitrates, Beni Urrutia MD  •  labetalol (NORMODYNE,TRANDATE) injection 20 mg, 20 mg, Intravenous, Q10 Min PRN, Beni Urrutia MD  •  levothyroxine (SYNTHROID, LEVOTHROID) tablet 75 mcg, 75 mcg, Oral, Q AM, Beni Urrutia MD  •  lidocaine (LIDODERM) 5 % 1 patch, 1 patch, Transdermal, Daily PRN, Beni Urrutia MD  •  magnesium hydroxide (MILK OF MAGNESIA) suspension 2400 mg/10mL 10 mL, 10 mL, Oral, Daily PRN, Beni Urrutia MD  •   multivitamin with minerals 1 tablet, 1 tablet, Oral, Daily, Beni Urrutia MD  •  naloxone (NARCAN) injection 0.1 mg, 0.1 mg, Intravenous, Q5 Min PRN, Beni Urrutia MD  •  nitroglycerin (NITROSTAT) SL tablet 0.4 mg, 0.4 mg, Sublingual, Q5 Min PRN, Beni Urrutia MD  •  ondansetron (ZOFRAN) tablet 4 mg, 4 mg, Oral, Q6H PRN **OR** ondansetron (ZOFRAN) injection 4 mg, 4 mg, Intravenous, Q6H PRN, Beni Urrutia MD  •  oxyCODONE-acetaminophen (PERCOCET)  MG per tablet 1 tablet, 1 tablet, Oral, Q4H PRN, Beni Urrutia MD  •  oxyCODONE-acetaminophen (PERCOCET) 7.5-325 MG per tablet 1 tablet, 1 tablet, Oral, Q4H PRN, Beni Urrutia MD  •  pantoprazole (PROTONIX) EC tablet 40 mg, 40 mg, Oral, QAM AC, Beni Urrutia MD  •  polyethylene glycol (MIRALAX) packet 17 g, 17 g, Oral, Daily, Beni Urrutia MD  •  predniSONE (DELTASONE) tablet 5 mg, 5 mg, Oral, Daily With Breakfast, Beni Urrutia MD  •  saccharomyces boulardii (FLORASTOR) capsule 250 mg, 250 mg, Oral, BID, Beni Urrutia MD  •  sennosides-docusate (PERICOLACE) 8.6-50 MG per tablet 2 tablet, 2 tablet, Oral, BID, Beni Urrutia MD  •  sodium chloride 0.9 % flush 10 mL, 10 mL, Intravenous, Q12H, Beni Urrutia MD  •  sodium chloride 0.9 % flush 10 mL, 10 mL, Intravenous, PRN, Beni Urrutia MD  •  valACYclovir (VALTREX) tablet 500 mg, 500 mg, Oral, Q24H, Beni Urrutia MD  •  zinc sulfate (ZINCATE) capsule 220 mg, 220 mg, Oral, Daily, Beni Urrutia MD    Data:     Code Status:    There are no questions and answers to display.       Family History   Problem Relation Age of Onset   • Cancer Mother    • Heart disease Father        Social History     Socioeconomic History   • Marital status:      Spouse name: Not on file   • Number of children: Not on file   • Years of education: Not on file   • Highest education level: Not on file   Tobacco  Use   • Smoking status: Never Smoker   • Smokeless tobacco: Never Used   Substance and Sexual Activity   • Alcohol use: No   • Drug use: Never   • Sexual activity: Defer       Vitals:  Wt 108 kg (239 lb)   BMI 39.77 kg/m²   T 99.3 P 87 R 20 /70 Sp02 93% (room air)    I/O (24Hr):  No intake or output data in the 24 hours ending 03/10/21 0922    Labs and imaging:      No results found for this or any previous visit (from the past 12 hour(s)).    Physical Examination:        Physical Exam  Vitals and nursing note reviewed.   Constitutional:       Appearance: Normal appearance.   HENT:      Head: Normocephalic and atraumatic.      Right Ear: External ear normal.      Left Ear: External ear normal.      Nose: Nose normal.      Mouth/Throat:      Mouth: Mucous membranes are moist.      Pharynx: Oropharynx is clear.   Eyes:      Extraocular Movements: Extraocular movements intact.      Pupils: Pupils are equal, round, and reactive to light.   Neck:      Comments: Cervical collar in place  Cardiovascular:      Rate and Rhythm: Normal rate and regular rhythm.      Pulses: Normal pulses.      Heart sounds: Normal heart sounds.   Pulmonary:      Effort: Pulmonary effort is normal.      Breath sounds: Normal breath sounds.   Abdominal:      General: Abdomen is flat. Bowel sounds are normal.      Palpations: Abdomen is soft.   Musculoskeletal:      Cervical back: Normal range of motion. Pain with movement present.      Comments: Generalized weakness  Limited ROM RLE   Skin:     General: Skin is warm and dry.      Capillary Refill: Capillary refill takes less than 2 seconds.      Comments: Wound vac intact   Neurological:      General: No focal deficit present.      Mental Status: She is alert and oriented to person, place, and time.   Psychiatric:         Mood and Affect: Mood normal.         Behavior: Behavior normal.         Thought Content: Thought content normal.           Assessment:             * No active hospital  problems. *    Past Medical History:   Diagnosis Date   • Arthritis    • Asthma    • Chronic sinusitis    • Coronary artery disease    • Eustachian tube dysfunction    • Heart disease    • Herpes simplex    • Hyperlipidemia    • Hypertension    • Knee dislocation    • Labral tear of right hip joint    • Laryngitis sicca    • Laryngitis, chronic    • MI (myocardial infarction) (CMS/HCC)    • Otorrhea    • Sensorineural hearing loss    • Sjogren's disease (CMS/HCC)         Plan:        1. MSSA bacteremia  2. Torn gluteus muscle s/p repair  3. Post-operative MSSA wound infection  4. Cervical spinal stenosis s/p corpectomy and decompression  5. RA  6. Chronic immunosuppression  7. Multifactorial anemia  8. Hypothyroidism  9. GERD  10. Hx CAD  11. Herpes simplex on chronic suppressive therapy  12. HTN  13. Right psoas abscess  14. L1-L2 discitis osteomyelitis  15. Constipation      Continue current treatment. Monitor counts. Increase activity as tolerated. Maintain patient safety.  Wound care per wound care team. Aggressive therapy as tolerated with activity/weightbearing restrictions per ortho recommendations.  Antibiotics under direction of ID. Continue pain control efforts and avoid oversedation. Continue bowel regimen.  Labs Thursday. Monitor temperature curve.      Electronically signed by DANIELA Hill on 3/10/2021 at 09:22 CST     I have discussed the care of Tawny Shin, including pertinent history and exam findings, with the nurse practitioner.    I have seen and examined the patient and the key elements of all parts of the encounter have been performed by me.  I agree with the assessment, plan and orders as documented by DANIELA Kyle, after I modified the exam findings and the plan of treatments and the final version is my approved version of the assessment.        Electronically signed by Beni Urrutia MD on 3/10/2021 at 15:35 CST

## 2021-03-10 NOTE — PROGRESS NOTES
NEUROSURGERY DAILY PROGRESS NOTE    ASSESSMENT:   Tawny Shin is a 65 y.o. with a significant comorbidity rheumatoid arthritis on disease modifying agents, prior lumbar fusion by Dr. Oropeza in 2018.  She presents with a new problem of postop surgical infection of her right hip with wound VAC dressing, progressive right lower extremity proximal weakness without numbness or radiculopathy. Physical exam findings of right iliopsoas weakness, and global hyperreflexia with Babinski and right Patience's and decreased  strength bilaterally.  Their imaging shows CT of the cervical spine with severe cervical stenosis at severe stenosis C5-6 and C6/7 and adjacent segment disease at L1/2 and L2/3 with concern for osteomyelitis discitis.    Past Medical History:   Diagnosis Date   • Arthritis    • Asthma    • Chronic sinusitis    • Coronary artery disease    • Eustachian tube dysfunction    • Heart disease    • Herpes simplex    • Hyperlipidemia    • Hypertension    • Knee dislocation    • Labral tear of right hip joint    • Laryngitis sicca    • Laryngitis, chronic    • MI (myocardial infarction) (CMS/HCC)    • Otorrhea    • Sensorineural hearing loss    • Sjogren's disease (CMS/HCC)      There are no active hospital problems to display for this patient.    PLAN:   Neuro: Stable.  Improved  strength.  Improved right lower extremity strength     Cervical stenosis    POD #7 C6 corpectomy   GUILLERMO DC'd   Cervical collar in place.  Trachea midline.  No difficulty with swallowing   Postop x-rays stable   Continue neurochecks every 4 hours   Ready to discharge from a neurosurgical perspective   Neurosurgery will continue to follow     Right psoas abscess   I&D right psoas abscess performed per IR (3/2/2021)   Body fluid culture: 4+ WBCs, rare 1+ gram-positive cocci   Light growth 2+ Staph aureus   Continue antibiotics per ID    Concern for discitis/osteomyelitis L1/2  L1/2 severe central canal stenosis  Severe stenosis from  adjacent segment disease   We will continue to monitor for now   May require L1 laminectomy with possible discectomy, foraminotomy, and debridement in the near future   Continue antibiotics per ID     CV: JADEN.  VS WNL  Pulm: No acute issues.  Maintaining O2 sat.  Continuous pulse oximetry   : Voiding spontaneously.  Bladder scans and I/O cath per policy  FEN: Cleared for oral diet.  Meds in apple sauce.   GI: Prophylaxis  ORTHO: Open wound right posterior hip.  MSSA infection, wound vac per PT   3/4/2021: Scant growth Staphylococcus aureus        Scant growth Enterococcus     ID: MSSA bacteria and Enterococcus.  Postop vancomycin and cefazolin   Then continue cafazolin   CRP continues to fall.   ORTHO: Heme:  DVT prophylaxis; SCD's  Pain: Tolerable at present, DC PCA and switch to oral meds   Dispo: PT/OT.      Max ambulation allowable by Ortho.   Ready for discharge from neurosurgical perspective    CHIEF COMPLAINT:   Right-sided lumbar back pain with a new complaint of right anterior thigh pain    Subjective  Symptom stable.     Objective:  General Appearance:  Comfortable, well-appearing and in no acute distress.    Vital signs: (most recent): Weight 108 kg (239 lb), not currently breastfeeding.      Neurologic Exam     Mental Status   Oriented to person, place, and time.   Attention: normal. Concentration: normal.   Speech: speech is normal   Level of consciousness: alert    Bright and awake.  Oriented x3.  Follows commands without prompting showing thumbs up into fingers bilaterally.     Cranial Nerves     CN II   Visual fields full to confrontation.     CN III, IV, VI   Pupils are equal, round, and reactive to light.  Extraocular motions are normal.     CN V   Facial sensation intact.     CN VII   Facial expression full, symmetric.     CN VIII   CN VIII normal.     CN IX, X   CN IX normal.     CN XI   CN XI normal.     Motor Exam     Strength   Right deltoid: 5/5  Left deltoid: 5/5  Right biceps: 5/5  Left  biceps: 5/5  Right triceps: 5/5  Left triceps: 5/5  Right wrist extension: 5/5  Left wrist extension: 5/5  Right interossei: 4/5  Left interossei: 4/5  Right iliopsoas: 2/5  Left iliopsoas: 5/5  Right quadriceps: 2/5  Left quadriceps: 5/5  Right anterior tibial: 3/5  Left anterior tibial: 5/5  Right gastroc: 4/5  Left gastroc: 5/5       Sensory Exam   Sensory deficit distribution on right: L1    Gait, Coordination, and Reflexes     Tremor   Resting tremor: absent  Intention tremor: absent  Action tremor: absent    Reflexes   Right plantar: upgoing  Left plantar: normal  Right Mims: present  Left Mims: absent  Right ankle clonus: present  Left ankle clonus: present  Right pendular knee jerk: absent  Left pendular knee jerk: absent    Incision: C, D, I    Drains: * No LDAs found *    Imaging Results (Last 24 Hours)     ** No results found for the last 24 hours. **        Lab Results (last 24 hours)     ** No results found for the last 24 hours. **        Taiwo Prado, APRN

## 2021-03-10 NOTE — PROGRESS NOTES
Infectious Diseases Progress Note    Patient:  Tawny Shin  YOB: 1955  MRN: [id number]   Admit date: 3/6/2021   Admitting Physician: Beni Urrutia MD  Primary Care Physician: Davon Urrutia MD    Chief Complaint/Interval History: Her low back discomfort is bothering her.  No new neurological symptoms.  She has been working with therapy.  No diarrhea or rash.    No intake or output data in the 24 hours ending 03/10/21 0385  Allergies:   Allergies   Allergen Reactions   • Atorvastatin Other (See Comments)     LEG CRAMPS     • Amoxicillin Rash   • Escitalopram Rash   • Nabumetone Rash   • Niacin Er Rash   • Penicillins Rash   • Simvastatin Rash     Current Scheduled Medications:   ascorbic acid, 500 mg, Oral, BID  carvedilol, 25 mg, Oral, BID With Meals  ceFAZolin, 2 g, Intravenous, Q8H  cyclobenzaprine, 10 mg, Oral, TID  docusate sodium, 100 mg, Oral, BID  folic acid, 1 mg, Oral, Daily  gabapentin, 100 mg, Oral, Q12H  heparin (porcine), 5,000 Units, Subcutaneous, Q12H  isosorbide mononitrate, 60 mg, Oral, BID - Nitrates  levothyroxine, 75 mcg, Oral, Q AM  multivitamin with minerals, 1 tablet, Oral, Daily  oxyCODONE-acetaminophen, 2 tablet, Oral, Q8H  pantoprazole, 40 mg, Oral, QAM AC  polyethylene glycol, 17 g, Oral, Daily  predniSONE, 5 mg, Oral, Daily With Breakfast  saccharomyces boulardii, 250 mg, Oral, BID  senna-docusate sodium, 2 tablet, Oral, BID  sodium chloride, 10 mL, Intravenous, Q12H  valACYclovir, 500 mg, Oral, Q24H  zinc sulfate, 220 mg, Oral, Daily      Current PRN Medications:  •  acetaminophen **OR** acetaminophen  •  bisacodyl  •  budesonide  •  cloNIDine  •  diazePAM  •  HYDROmorphone  •  labetalol  •  lidocaine  •  magnesium hydroxide  •  naloxone  •  nitroglycerin  •  ondansetron **OR** ondansetron  •  oxyCODONE-acetaminophen  •  sodium chloride    Review of Systems see HPI    Vital Signs:  Wt 108 kg (239 lb)   BMI 39.77 kg/m²     Physical Exam  Vital signs -  reviewed.  Line/IV site - No erythema, warmth, induration, or tenderness.  Lower extremity exam without change    Lab Results:  CBC:   Results from last 7 days   Lab Units 03/08/21  1122 03/05/21  0436 03/04/21  0315   WBC 10*3/mm3 7.32 5.99 3.46   HEMOGLOBIN g/dL 9.5* 9.9* 9.5*   HEMATOCRIT % 30.3* 30.6* 29.1*   PLATELETS 10*3/mm3 197 243 221     BMP:  Results from last 7 days   Lab Units 03/08/21  1122 03/05/21  0436 03/04/21  0315   SODIUM mmol/L 139 140 137   POTASSIUM mmol/L 3.8 4.4 4.4   CHLORIDE mmol/L 103 106 103   CO2 mmol/L 27.0 27.0 26.0   BUN mg/dL 7* 19 11   CREATININE mg/dL 0.37* 0.51* 0.35*   GLUCOSE mg/dL 131* 101* 145*   CALCIUM mg/dL 8.6 9.0 8.9     Culture Results:   Staph aureus susceptibility (right psoas muscle aspirate)-MSSA    Radiology: None  Additional Studies Reviewed: None    Impression:   1.  MSSA bacteremia  2.  Postoperative infection right hip following gluteus muscle repair  3.  Right psoas abscess-staph aureus (MSSA)  4.  Discitis/osteomyelitis L1/L2  5.  Cervical stenosis-she has had surgical decompression  6.  Rheumatoid arthritis    Recommendations:   Continue cefazolin  Continue physical therapy  No new recommendations at present    Elie Ohara MD

## 2021-03-11 LAB
ANION GAP SERPL CALCULATED.3IONS-SCNC: 5 MMOL/L (ref 5–15)
BASOPHILS # BLD AUTO: 0.04 10*3/MM3 (ref 0–0.2)
BASOPHILS NFR BLD AUTO: 0.8 % (ref 0–1.5)
BUN SERPL-MCNC: 10 MG/DL (ref 8–23)
BUN/CREAT SERPL: 23.3 (ref 7–25)
CALCIUM SPEC-SCNC: 8.7 MG/DL (ref 8.6–10.5)
CHLORIDE SERPL-SCNC: 103 MMOL/L (ref 98–107)
CO2 SERPL-SCNC: 29 MMOL/L (ref 22–29)
CREAT SERPL-MCNC: 0.43 MG/DL (ref 0.57–1)
CRP SERPL-MCNC: 10.4 MG/DL (ref 0–0.5)
DEPRECATED RDW RBC AUTO: 51.2 FL (ref 37–54)
EOSINOPHIL # BLD AUTO: 0.14 10*3/MM3 (ref 0–0.4)
EOSINOPHIL NFR BLD AUTO: 2.7 % (ref 0.3–6.2)
ERYTHROCYTE [DISTWIDTH] IN BLOOD BY AUTOMATED COUNT: 15.7 % (ref 12.3–15.4)
ERYTHROCYTE [SEDIMENTATION RATE] IN BLOOD: 37 MM/HR (ref 0–20)
GFR SERPL CREATININE-BSD FRML MDRD: 146 ML/MIN/1.73
GLUCOSE SERPL-MCNC: 94 MG/DL (ref 65–99)
HCT VFR BLD AUTO: 28.2 % (ref 34–46.6)
HGB BLD-MCNC: 9.1 G/DL (ref 12–15.9)
IMM GRANULOCYTES # BLD AUTO: 0.05 10*3/MM3 (ref 0–0.05)
IMM GRANULOCYTES NFR BLD AUTO: 1 % (ref 0–0.5)
LYMPHOCYTES # BLD AUTO: 1.27 10*3/MM3 (ref 0.7–3.1)
LYMPHOCYTES NFR BLD AUTO: 24.8 % (ref 19.6–45.3)
MCH RBC QN AUTO: 28.6 PG (ref 26.6–33)
MCHC RBC AUTO-ENTMCNC: 32.3 G/DL (ref 31.5–35.7)
MCV RBC AUTO: 88.7 FL (ref 79–97)
MONOCYTES # BLD AUTO: 0.87 10*3/MM3 (ref 0.1–0.9)
MONOCYTES NFR BLD AUTO: 17 % (ref 5–12)
NEUTROPHILS NFR BLD AUTO: 2.76 10*3/MM3 (ref 1.7–7)
NEUTROPHILS NFR BLD AUTO: 53.7 % (ref 42.7–76)
NRBC BLD AUTO-RTO: 0 /100 WBC (ref 0–0.2)
PLATELET # BLD AUTO: 176 10*3/MM3 (ref 140–450)
PMV BLD AUTO: 10.1 FL (ref 6–12)
POTASSIUM SERPL-SCNC: 3.7 MMOL/L (ref 3.5–5.2)
RBC # BLD AUTO: 3.18 10*6/MM3 (ref 3.77–5.28)
SODIUM SERPL-SCNC: 137 MMOL/L (ref 136–145)
WBC # BLD AUTO: 5.13 10*3/MM3 (ref 3.4–10.8)

## 2021-03-11 PROCEDURE — 85025 COMPLETE CBC W/AUTO DIFF WBC: CPT | Performed by: INTERNAL MEDICINE

## 2021-03-11 PROCEDURE — 97530 THERAPEUTIC ACTIVITIES: CPT

## 2021-03-11 PROCEDURE — 86140 C-REACTIVE PROTEIN: CPT | Performed by: INTERNAL MEDICINE

## 2021-03-11 PROCEDURE — 80048 BASIC METABOLIC PNL TOTAL CA: CPT | Performed by: INTERNAL MEDICINE

## 2021-03-11 PROCEDURE — 99024 POSTOP FOLLOW-UP VISIT: CPT | Performed by: NURSE PRACTITIONER

## 2021-03-11 PROCEDURE — 97116 GAIT TRAINING THERAPY: CPT

## 2021-03-11 PROCEDURE — 97535 SELF CARE MNGMENT TRAINING: CPT

## 2021-03-11 PROCEDURE — 25010000002 CEFAZOLIN PER 500 MG: Performed by: INTERNAL MEDICINE

## 2021-03-11 PROCEDURE — 85651 RBC SED RATE NONAUTOMATED: CPT | Performed by: INTERNAL MEDICINE

## 2021-03-11 PROCEDURE — 63710000001 PREDNISONE PER 5 MG: Performed by: INTERNAL MEDICINE

## 2021-03-11 PROCEDURE — 25010000002 HEPARIN (PORCINE) PER 1000 UNITS: Performed by: INTERNAL MEDICINE

## 2021-03-11 PROCEDURE — C1751 CATH, INF, PER/CENT/MIDLINE: HCPCS

## 2021-03-11 RX ORDER — LIDOCAINE HYDROCHLORIDE 10 MG/ML
1 INJECTION, SOLUTION EPIDURAL; INFILTRATION; INTRACAUDAL; PERINEURAL ONCE
Status: COMPLETED | OUTPATIENT
Start: 2021-03-11 | End: 2021-03-11

## 2021-03-11 RX ADMIN — LIDOCAINE HYDROCHLORIDE 1 ML: 10 INJECTION, SOLUTION EPIDURAL; INFILTRATION; INTRACAUDAL; PERINEURAL at 14:39

## 2021-03-11 NOTE — PROGRESS NOTES
NEUROSURGERY DAILY PROGRESS NOTE    ASSESSMENT:   Tawny Shin is a 65 y.o. with a significant comorbidity rheumatoid arthritis on disease modifying agents, prior lumbar fusion by Dr. Oropeza in 2018.  She presents with a new problem of postop surgical infection of her right hip with wound VAC dressing, progressive right lower extremity proximal weakness without numbness or radiculopathy. Physical exam findings of right iliopsoas weakness, and global hyperreflexia with Babinski and right Patience's and decreased  strength bilaterally.  Their imaging shows CT of the cervical spine with severe cervical stenosis at severe stenosis C5-6 and C6/7 and adjacent segment disease at L1/2 and L2/3 with concern for osteomyelitis discitis.    Past Medical History:   Diagnosis Date   • Arthritis    • Asthma    • Chronic sinusitis    • Coronary artery disease    • Eustachian tube dysfunction    • Heart disease    • Herpes simplex    • Hyperlipidemia    • Hypertension    • Knee dislocation    • Labral tear of right hip joint    • Laryngitis sicca    • Laryngitis, chronic    • MI (myocardial infarction) (CMS/HCC)    • Otorrhea    • Sensorineural hearing loss    • Sjogren's disease (CMS/HCC)      There are no active hospital problems to display for this patient.    PLAN:   Neuro: Stable/unchanged.  Improved  strength.  Improved right lower extremity strength     Cervical stenosis    POD #8 C6 corpectomy   GUILLERMO DC'd   Cervical collar in place.  Trachea midline.  No difficulty with swallowing   Postop x-rays stable   Continue neurochecks every 4 hours   Ready to discharge from a neurosurgical perspective   Neurosurgery will continue to follow     Right psoas abscess   I&D right psoas abscess performed per IR (3/2/2021)   Body fluid culture: 4+ WBCs, rare 1+ gram-positive cocci   Light growth 2+ Staph aureus   Continue antibiotics per ID    Concern for discitis/osteomyelitis L1/2  L1/2 severe central canal stenosis  Severe stenosis  from adjacent segment disease   We will continue to monitor for now   May require L1 laminectomy with possible discectomy, foraminotomy, and debridement in the near future   Continue antibiotics per ID   TLSO brace when out of bed     CV: JADEN.  VS WNL  Pulm: No acute issues.  Maintaining O2 sat.  Continuous pulse oximetry   : Voiding spontaneously.  Bladder scans and I/O cath per policy  FEN: Cleared for oral diet.  Meds in apple sauce.   GI: Prophylaxis  ORTHO: Open wound right posterior hip.  MSSA infection, wound vac per PT   3/4/2021: Scant growth Staphylococcus aureus        Scant growth Enterococcus     ID: MSSA bacteria and Enterococcus.  Postop vancomycin and cefazolin   Then continue cafazolin   CRP continues to fall.   ORTHO: Heme:  DVT prophylaxis; SCD's  Pain: Tolerable at present, DC PCA and switch to oral meds   Dispo: PT/OT.      Max ambulation allowable by Ortho.   Ready for discharge from neurosurgical perspective    CHIEF COMPLAINT:   Right-sided lumbar back pain with a new complaint of right anterior thigh pain    Subjective  Symptom stable.     Objective:  General Appearance:  Comfortable, well-appearing and in no acute distress.    Vital signs: (most recent): Weight 108 kg (239 lb), not currently breastfeeding.      Neurologic Exam     Mental Status   Oriented to person, place, and time.   Attention: normal. Concentration: normal.   Speech: speech is normal   Level of consciousness: alert    Bright and awake.  Oriented x3.  Follows commands without prompting showing thumbs up into fingers bilaterally.     Cranial Nerves     CN II   Visual fields full to confrontation.     CN III, IV, VI   Pupils are equal, round, and reactive to light.  Extraocular motions are normal.     CN V   Facial sensation intact.     CN VII   Facial expression full, symmetric.     CN VIII   CN VIII normal.     CN IX, X   CN IX normal.     CN XI   CN XI normal.     Motor Exam     Strength   Right deltoid: 5/5  Left  deltoid: 5/5  Right biceps: 5/5  Left biceps: 5/5  Right triceps: 5/5  Left triceps: 5/5  Right wrist extension: 5/5  Left wrist extension: 5/5  Right interossei: 4/5  Left interossei: 4/5  Right iliopsoas: 2/5  Left iliopsoas: 5/5  Right quadriceps: 2/5  Left quadriceps: 5/5  Right anterior tibial: 3/5  Left anterior tibial: 5/5  Right gastroc: 4/5  Left gastroc: 5/5       Sensory Exam   Sensory deficit distribution on right: L1    Gait, Coordination, and Reflexes     Tremor   Resting tremor: absent  Intention tremor: absent  Action tremor: absent    Reflexes   Right plantar: upgoing  Left plantar: normal  Right Mims: present  Left Mims: absent  Right ankle clonus: present  Left ankle clonus: present  Right pendular knee jerk: absent  Left pendular knee jerk: absent    Incision: C, D, I    Drains: * No LDAs found *    Imaging Results (Last 24 Hours)     ** No results found for the last 24 hours. **        Lab Results (last 24 hours)     Procedure Component Value Units Date/Time    Basic Metabolic Panel [[id number]]  (Abnormal) Collected: 03/11/21 0500    Specimen: Blood Updated: 03/11/21 0654     Glucose 94 mg/dL      BUN 10 mg/dL      Creatinine 0.43 mg/dL      Sodium 137 mmol/L      Potassium 3.7 mmol/L      Chloride 103 mmol/L      CO2 29.0 mmol/L      Calcium 8.7 mg/dL      eGFR Non African Amer 146 mL/min/1.73      BUN/Creatinine Ratio 23.3     Anion Gap 5.0 mmol/L     Narrative:      GFR Normal >60  Chronic Kidney Disease <60  Kidney Failure <15      C-reactive Protein [[id number]]  (Abnormal) Collected: 03/11/21 0500    Specimen: Blood Updated: 03/11/21 0654     C-Reactive Protein 10.40 mg/dL     Sedimentation Rate [[id number]]  (Abnormal) Collected: 03/11/21 0500    Specimen: Blood Updated: 03/11/21 0617     Sed Rate 37 mm/hr     CBC & Differential [[id number]]  (Abnormal) Collected: 03/11/21 0500    Specimen: Blood Updated: 03/11/21 0605    Narrative:      The following orders were created for panel  order CBC & Differential.  Procedure                               Abnormality         Status                     ---------                               -----------         ------                     CBC Auto Differential[[id number]]        Abnormal            Final result                 Please view results for these tests on the individual orders.    CBC Auto Differential [[id number]]  (Abnormal) Collected: 03/11/21 0500    Specimen: Blood Updated: 03/11/21 0605     WBC 5.13 10*3/mm3      RBC 3.18 10*6/mm3      Hemoglobin 9.1 g/dL      Hematocrit 28.2 %      MCV 88.7 fL      MCH 28.6 pg      MCHC 32.3 g/dL      RDW 15.7 %      RDW-SD 51.2 fl      MPV 10.1 fL      Platelets 176 10*3/mm3      Neutrophil % 53.7 %      Lymphocyte % 24.8 %      Monocyte % 17.0 %      Eosinophil % 2.7 %      Basophil % 0.8 %      Immature Grans % 1.0 %      Neutrophils, Absolute 2.76 10*3/mm3      Lymphocytes, Absolute 1.27 10*3/mm3      Monocytes, Absolute 0.87 10*3/mm3      Eosinophils, Absolute 0.14 10*3/mm3      Basophils, Absolute 0.04 10*3/mm3      Immature Grans, Absolute 0.05 10*3/mm3      nRBC 0.0 /100 WBC         Taiwo Prado, APRN

## 2021-03-11 NOTE — PROGRESS NOTES
ARGELIA Guerrero APRN        Internal Medicine Progress Note    3/11/2021   11:01 CST    Name:  Tawny Shin  MRN:    [id number]     Acct:     [id number]   Room:  [address] [place] [address] Day: 0     Admit Date: 3/6/2021 11:36 AM  PCP: Davon Urrutia MD    Subjective:     C/C: Wound care and rehabilitation efforts.     Interval History: Status: stable. Resting in bed. No family at bedside. Ambulated in hallway with therapy.Afebrile.  C/o increased low back pain despite adjustments to pain medication regimen. Tolerating treatment thus far. Progressing with therapy. Counts stable.   Review of Systems   Constitutional: Positive for malaise/fatigue. Negative for chills and fever.   HENT: Negative for congestion, ear discharge and sore throat.         Cervical collar in place   Eyes: Negative for blurred vision, double vision and pain.   Cardiovascular: Negative for chest pain, claudication and dyspnea on exertion.   Respiratory: Negative for cough, hemoptysis and shortness of breath.    Endocrine: Positive for heat intolerance. Negative for cold intolerance.   Hematologic/Lymphatic: Negative for adenopathy and bleeding problem.   Skin: Positive for poor wound healing. Negative for itching and rash.   Musculoskeletal: Positive for back pain, falls and joint pain.   Gastrointestinal: Positive for constipation. Negative for abdominal pain, diarrhea, nausea and vomiting.   Genitourinary: Negative for bladder incontinence, flank pain and frequency.   Neurological: Positive for weakness. Negative for difficulty with concentration.   Psychiatric/Behavioral: Negative for altered mental status, depression and suicidal ideas.   Allergic/Immunologic: Negative for environmental allergies, HIV exposure and hives.         Medications:     Allergies:   Allergies   Allergen Reactions   • Atorvastatin Other (See Comments)     LEG CRAMPS     • Amoxicillin Rash   • Escitalopram Rash   • Nabumetone Rash   •  Niacin Er Rash   • Penicillins Rash   • Simvastatin Rash       Current Meds:   Current Facility-Administered Medications:   •  acetaminophen (TYLENOL) tablet 650 mg, 650 mg, Oral, Q4H PRN **OR** acetaminophen (TYLENOL) suppository 650 mg, 650 mg, Rectal, Q4H PRN, Beni Urrutia MD  •  ascorbic acid (VITAMIN C) tablet 500 mg, 500 mg, Oral, BID, Beni Urrutia MD  •  bisacodyl (DULCOLAX) suppository 10 mg, 10 mg, Rectal, Daily PRN, Beni Urrutia MD  •  budesonide (PULMICORT) nebulizer solution 0.5 mg, 0.5 mg, Nebulization, BID PRN, Beni Urrutia MD  •  carvedilol (COREG) tablet 25 mg, 25 mg, Oral, BID With Meals, Beni Urrutia MD  •  ceFAZolin in 0.9% normal saline (ANCEF) IVPB solution 2 g, 2 g, Intravenous, Q8H, Beni Urrutia MD  •  cloNIDine (CATAPRES) tablet 0.1 mg, 0.1 mg, Oral, BID PRN, Beni Urrutia MD  •  cyclobenzaprine (FLEXERIL) tablet 10 mg, 10 mg, Oral, TID, Beni Urrutia MD  •  diazePAM (VALIUM) tablet 2 mg, 2 mg, Oral, Q6H PRN, Beni Urrutia MD  •  docusate sodium (COLACE) capsule 100 mg, 100 mg, Oral, BID, Beni Urrutia MD  •  folic acid (FOLVITE) tablet 1 mg, 1 mg, Oral, Daily, Beni Urrutia MD  •  gabapentin (NEURONTIN) capsule 100 mg, 100 mg, Oral, Q12H, Beni Urrutia MD  •  heparin (porcine) 5000 UNIT/ML injection 5,000 Units, 5,000 Units, Subcutaneous, Q12H, Beni Urrutia MD  •  HYDROmorphone (DILAUDID) injection 1 mg, 1 mg, Intravenous, PRN, Beni Urrutia MD  •  isosorbide mononitrate (IMDUR) 24 hr tablet 60 mg, 60 mg, Oral, BID - Nitrates, Beni Urrutia MD  •  labetalol (NORMODYNE,TRANDATE) injection 20 mg, 20 mg, Intravenous, Q10 Min PRN, Beni Urrutia MD  •  levothyroxine (SYNTHROID, LEVOTHROID) tablet 75 mcg, 75 mcg, Oral, Q AM, Beni Urrutia MD  •  lidocaine (LIDODERM) 5 % 1 patch, 1 patch, Transdermal, Daily PRN, Beni Urrutia MD  •   magnesium hydroxide (MILK OF MAGNESIA) suspension 2400 mg/10mL 10 mL, 10 mL, Oral, Daily PRN, Beni Urrutia MD  •  multivitamin with minerals 1 tablet, 1 tablet, Oral, Daily, Beni Urrutia MD  •  naloxone (NARCAN) injection 0.1 mg, 0.1 mg, Intravenous, Q5 Min PRN, Beni Urrutia MD  •  nitroglycerin (NITROSTAT) SL tablet 0.4 mg, 0.4 mg, Sublingual, Q5 Min PRN, Beni Urrutia MD  •  ondansetron (ZOFRAN) tablet 4 mg, 4 mg, Oral, Q6H PRN **OR** ondansetron (ZOFRAN) injection 4 mg, 4 mg, Intravenous, Q6H PRN, Beni Urrutia MD  •  oxyCODONE-acetaminophen (PERCOCET)  MG per tablet 2 tablet, 2 tablet, Oral, Q8H, Beni Urrutia MD  •  oxyCODONE-acetaminophen (PERCOCET) 7.5-325 MG per tablet 1 tablet, 1 tablet, Oral, Q4H PRN, Beni Urrutia MD  •  pantoprazole (PROTONIX) EC tablet 40 mg, 40 mg, Oral, QAM AC, Beni Urrutia MD  •  polyethylene glycol (MIRALAX) packet 17 g, 17 g, Oral, Daily, Beni Urrutia MD  •  predniSONE (DELTASONE) tablet 5 mg, 5 mg, Oral, Daily With Breakfast, Beni Urrutia MD  •  saccharomyces boulardii (FLORASTOR) capsule 250 mg, 250 mg, Oral, BID, Beni Urrutia MD  •  sennosides-docusate (PERICOLACE) 8.6-50 MG per tablet 2 tablet, 2 tablet, Oral, BID, Beni Urrutia MD  •  sodium chloride 0.9 % flush 10 mL, 10 mL, Intravenous, Q12H, Beni Urrutia MD  •  sodium chloride 0.9 % flush 10 mL, 10 mL, Intravenous, PRN, Beni Urrutia MD  •  valACYclovir (VALTREX) tablet 500 mg, 500 mg, Oral, Q24H, Beni Urrutia MD  •  zinc sulfate (ZINCATE) capsule 220 mg, 220 mg, Oral, Daily, Beni Urrutia MD    Data:     Code Status:    There are no questions and answers to display.       Family History   Problem Relation Age of Onset   • Cancer Mother    • Heart disease Father        Social History     Socioeconomic History   • Marital status:      Spouse name: Not on file    • Number of children: Not on file   • Years of education: Not on file   • Highest education level: Not on file   Tobacco Use   • Smoking status: Never Smoker   • Smokeless tobacco: Never Used   Substance and Sexual Activity   • Alcohol use: No   • Drug use: Never   • Sexual activity: Defer       Vitals:  Wt 108 kg (239 lb)   BMI 39.77 kg/m²   T 98.5 P 73 R 24 /62 Sp02 90% (room air)    I/O (24Hr):  No intake or output data in the 24 hours ending 03/11/21 1101    Labs and imaging:      Recent Results (from the past 12 hour(s))   Basic Metabolic Panel    Collection Time: 03/11/21  5:00 AM    Specimen: Blood   Result Value Ref Range    Glucose 94 65 - 99 mg/dL    BUN 10 8 - 23 mg/dL    Creatinine 0.43 (L) 0.57 - 1.00 mg/dL    Sodium 137 136 - 145 mmol/L    Potassium 3.7 3.5 - 5.2 mmol/L    Chloride 103 98 - 107 mmol/L    CO2 29.0 22.0 - 29.0 mmol/L    Calcium 8.7 8.6 - 10.5 mg/dL    eGFR Non African Amer 146 >60 mL/min/1.73    BUN/Creatinine Ratio 23.3 7.0 - 25.0    Anion Gap 5.0 5.0 - 15.0 mmol/L   Sedimentation Rate    Collection Time: 03/11/21  5:00 AM    Specimen: Blood   Result Value Ref Range    Sed Rate 37 (H) 0 - 20 mm/hr   C-reactive Protein    Collection Time: 03/11/21  5:00 AM    Specimen: Blood   Result Value Ref Range    C-Reactive Protein 10.40 (H) 0.00 - 0.50 mg/dL   CBC Auto Differential    Collection Time: 03/11/21  5:00 AM    Specimen: Blood   Result Value Ref Range    WBC 5.13 3.40 - 10.80 10*3/mm3    RBC 3.18 (L) 3.77 - 5.28 10*6/mm3    Hemoglobin 9.1 (L) 12.0 - 15.9 g/dL    Hematocrit 28.2 (L) 34.0 - 46.6 %    MCV 88.7 79.0 - 97.0 fL    MCH 28.6 26.6 - 33.0 pg    MCHC 32.3 31.5 - 35.7 g/dL    RDW 15.7 (H) 12.3 - 15.4 %    RDW-SD 51.2 37.0 - 54.0 fl    MPV 10.1 6.0 - 12.0 fL    Platelets 176 140 - 450 10*3/mm3    Neutrophil % 53.7 42.7 - 76.0 %    Lymphocyte % 24.8 19.6 - 45.3 %    Monocyte % 17.0 (H) 5.0 - 12.0 %    Eosinophil % 2.7 0.3 - 6.2 %    Basophil % 0.8 0.0 - 1.5 %    Immature  Grans % 1.0 (H) 0.0 - 0.5 %    Neutrophils, Absolute 2.76 1.70 - 7.00 10*3/mm3    Lymphocytes, Absolute 1.27 0.70 - 3.10 10*3/mm3    Monocytes, Absolute 0.87 0.10 - 0.90 10*3/mm3    Eosinophils, Absolute 0.14 0.00 - 0.40 10*3/mm3    Basophils, Absolute 0.04 0.00 - 0.20 10*3/mm3    Immature Grans, Absolute 0.05 0.00 - 0.05 10*3/mm3    nRBC 0.0 0.0 - 0.2 /100 WBC       Physical Examination:        Physical Exam  Vitals and nursing note reviewed.   Constitutional:       Appearance: Normal appearance.   HENT:      Head: Normocephalic and atraumatic.      Right Ear: External ear normal.      Left Ear: External ear normal.      Nose: Nose normal.      Mouth/Throat:      Mouth: Mucous membranes are moist.      Pharynx: Oropharynx is clear.   Eyes:      Extraocular Movements: Extraocular movements intact.      Pupils: Pupils are equal, round, and reactive to light.   Neck:      Comments: Cervical collar in place  Incision c/d/i  Cardiovascular:      Rate and Rhythm: Normal rate and regular rhythm.      Pulses: Normal pulses.      Heart sounds: Normal heart sounds.   Pulmonary:      Effort: Pulmonary effort is normal.      Breath sounds: Normal breath sounds.   Abdominal:      General: Abdomen is flat. Bowel sounds are normal.      Palpations: Abdomen is soft.   Musculoskeletal:      Cervical back: Normal range of motion. Pain with movement present.      Comments: Generalized weakness  Limited ROM RLE   Skin:     General: Skin is warm and dry.      Capillary Refill: Capillary refill takes less than 2 seconds.      Comments: Wound vac intact   Neurological:      General: No focal deficit present.      Mental Status: She is alert and oriented to person, place, and time.   Psychiatric:         Mood and Affect: Mood normal.         Behavior: Behavior normal.         Thought Content: Thought content normal.           Assessment:             * No active hospital problems. *    Past Medical History:   Diagnosis Date   • Arthritis     • Asthma    • Chronic sinusitis    • Coronary artery disease    • Eustachian tube dysfunction    • Heart disease    • Herpes simplex    • Hyperlipidemia    • Hypertension    • Knee dislocation    • Labral tear of right hip joint    • Laryngitis sicca    • Laryngitis, chronic    • MI (myocardial infarction) (CMS/HCC)    • Otorrhea    • Sensorineural hearing loss    • Sjogren's disease (CMS/HCC)         Plan:        1. MSSA bacteremia  2. Torn gluteus muscle s/p repair  3. Post-operative MSSA wound infection  4. Cervical spinal stenosis s/p corpectomy and decompression  5. RA  6. Chronic immunosuppression  7. Multifactorial anemia  8. Hypothyroidism  9. GERD  10. Hx CAD  11. Herpes simplex on chronic suppressive therapy  12. HTN  13. Right psoas abscess  14. L1-L2 discitis osteomyelitis  15. Constipation      Continue current treatment. Monitor counts. Increase activity as tolerated. Maintain patient safety.  Wound care per wound care team. Aggressive therapy as tolerated with activity/weightbearing restrictions per ortho recommendations.  Antibiotics under direction of ID. Continue pain control efforts and avoid oversedation. Continue bowel regimen.  Labs Monday.    Electronically signed by DANIELA Hill on 3/11/2021 at 11:01 CST

## 2021-03-11 NOTE — PROGRESS NOTES
Adult Nutrition  Assessment/PES    Patient Name:  Tawny Shin  YOB: 1955  MRN: [id number]  Admit Date:  3/6/2021    Assessment Date:  3/11/2021    Comments:  PO intake is suboptimal, 42% avg of 6 meals. She has supplements in place for increased nutrition needs to aid with wound healing. PT/OT to obtain standing scale wt when able to verify pts current wt. Will continue to follow for nutrition needs.     Reason for Assessment     Row Name 03/11/21 1351          Reason for Assessment    Reason For Assessment  follow-up protocol         Nutrition/Diet History     Row Name 03/11/21 1350          Nutrition/Diet History    Typical Food/Fluid Intake  Pt continues with fair appetite. Requested standing scale wt from PT/OT when able to help clarify current wt d/t large wt fluctuation over the duration of her admission.         Anthropometrics     Row Name 03/11/21 2222          Usual Body Weight (UBW)    Weight Loss Time Frame  current wt 239#        Body Mass Index (BMI)    BMI Assessment  BMI 35-39.9: obesity grade II         Labs/Tests/Procedures/Meds     Row Name 03/11/21 1358          Labs/Procedures/Meds    Lab Results Reviewed  reviewed        Medications    Pertinent Medications Reviewed  reviewed         Physical Findings     Row Name 03/11/21 2376          Physical Findings    Overall Physical Appearance  obese;other (see comments) generalized weakness     Gastrointestinal  constipation BM 3/7     Skin  surgical incision;poor skin integrity/turgor;edema R hip wound           Nutrition Prescription Ordered     Row Name 03/11/21 1355          Nutrition Prescription PO    Current PO Diet  Soft Texture     Texture  Ground     Fluid Consistency  Thin     Supplement  Boost Plus (Ensure Enlive, Ensure Plus)     Supplement Frequency  3 times a day         Evaluation of Received Nutrient/Fluid Intake     Row Name 03/11/21 9791          Nutrient/Fluid Evaluation    Number of Days Evaluated  2 days      Additional Documentation  Fluid Intake Evaluation (Group)        Fluid Intake Evaluation    Oral Fluid (mL)  1320     IV Fluid (mL)  250        PO Evaluation    Number of Days PO Intake Evaluated  3 days     Number of Meals  6     % PO Intake  42               Problem/Interventions:  Problem 1     Row Name 03/11/21 1400          Nutrition Diagnoses Problem 1    Problem 1  Increased Nutrient Needs     Macronutrient  Protein     Etiology (related to)  Medical Diagnosis     Infectious Disease  MSSA     Skin  Surgical wound;Other (comment) Rt hip infection s/p reapair of torn gluteus; wound vac; s/p CERVICAL 6 CORPECTOMY WITH TITANIUM CAGE     Signs/Symptoms (evidenced by)  Other (comment) to promote wound healing               Intervention Goal     Row Name 03/11/21 1400          Intervention Goal    General  Maintain nutrition;Meet nutritional needs for age/condition;Reduce/improve symptoms;Disease management/therapy     PO  Increase intake;Meet estimated needs     Weight  No significant weight loss         Nutrition Intervention     Row Name 03/11/21 1401          Nutrition Intervention    RD/Tech Action  Follow Tx progress;Care plan reviewd           Education/Evaluation     Row Name 03/11/21 1401          Education    Education  No discharge needs identified at this time        Monitor/Evaluation    Monitor  Per protocol           Electronically signed by:  Lauren Whitmore  03/11/21 14:01 CST

## 2021-03-12 ENCOUNTER — APPOINTMENT (OUTPATIENT)
Dept: GENERAL RADIOLOGY | Facility: HOSPITAL | Age: 66
End: 2021-03-12

## 2021-03-12 PROCEDURE — 97110 THERAPEUTIC EXERCISES: CPT

## 2021-03-12 PROCEDURE — 97530 THERAPEUTIC ACTIVITIES: CPT

## 2021-03-12 PROCEDURE — 73502 X-RAY EXAM HIP UNI 2-3 VIEWS: CPT

## 2021-03-12 PROCEDURE — 63710000001 PREDNISONE PER 5 MG: Performed by: INTERNAL MEDICINE

## 2021-03-12 PROCEDURE — 97535 SELF CARE MNGMENT TRAINING: CPT

## 2021-03-12 PROCEDURE — 25010000002 HEPARIN (PORCINE) PER 1000 UNITS: Performed by: INTERNAL MEDICINE

## 2021-03-12 PROCEDURE — 25010000002 CEFAZOLIN PER 500 MG: Performed by: INTERNAL MEDICINE

## 2021-03-12 PROCEDURE — 97116 GAIT TRAINING THERAPY: CPT

## 2021-03-12 PROCEDURE — 97606 NEG PRS WND THER DME>50 SQCM: CPT | Performed by: PHYSICAL THERAPIST

## 2021-03-12 RX ORDER — OXYCODONE AND ACETAMINOPHEN 7.5; 325 MG/1; MG/1
1 TABLET ORAL EVERY 4 HOURS PRN
Status: DISPENSED | OUTPATIENT
Start: 2021-03-12 | End: 2021-03-19

## 2021-03-12 RX ORDER — FUROSEMIDE 20 MG/1
20 TABLET ORAL
Status: DISPENSED | OUTPATIENT
Start: 2021-03-12 | End: 2021-03-13

## 2021-03-12 NOTE — PROGRESS NOTES
ARGELIA Guerrero APRN        Internal Medicine Progress Note    3/12/2021   09:53 CST    Name:  Tawny Shin  MRN:    [id number]     Acct:     [id number]   Room:  [address] [place] [address] Day: 0     Admit Date: 3/6/2021 11:36 AM  PCP: Davon Urrutia MD    Subjective:     C/C: Wound care and rehabilitation efforts.     Interval History: Status: stable. Up to chair. No family at bedside. Ambulated in hallway with therapy.Afebrile.  C/o increased low back pain despite adjustments to pain medication regimen. Tolerating treatment thus far. Progressing with therapy. Increased edema to BLE. Reports to therapies and nursing that she has developed a popping sensation in her right hip.   Review of Systems   Constitutional: Positive for malaise/fatigue. Negative for chills and fever.   HENT: Negative for congestion, ear discharge and sore throat.         Cervical collar in place   Eyes: Negative for blurred vision, double vision and pain.   Cardiovascular: Negative for chest pain, claudication and dyspnea on exertion.   Respiratory: Negative for cough, hemoptysis and shortness of breath.    Endocrine: Positive for heat intolerance. Negative for cold intolerance.   Hematologic/Lymphatic: Negative for adenopathy and bleeding problem.   Skin: Positive for poor wound healing. Negative for itching and rash.   Musculoskeletal: Positive for back pain, falls and joint pain.   Gastrointestinal: Positive for constipation. Negative for abdominal pain, diarrhea, nausea and vomiting.   Genitourinary: Negative for bladder incontinence, flank pain and frequency.   Neurological: Positive for weakness. Negative for difficulty with concentration.   Psychiatric/Behavioral: Negative for altered mental status, depression and suicidal ideas.   Allergic/Immunologic: Negative for environmental allergies, HIV exposure and hives.         Medications:     Allergies:   Allergies   Allergen Reactions   • Atorvastatin Other  (See Comments)     LEG CRAMPS     • Amoxicillin Rash   • Escitalopram Rash   • Nabumetone Rash   • Niacin Er Rash   • Penicillins Rash   • Simvastatin Rash       Current Meds:   Current Facility-Administered Medications:   •  acetaminophen (TYLENOL) tablet 650 mg, 650 mg, Oral, Q4H PRN **OR** acetaminophen (TYLENOL) suppository 650 mg, 650 mg, Rectal, Q4H PRN, Beni Urrutia MD  •  ascorbic acid (VITAMIN C) tablet 500 mg, 500 mg, Oral, BID, Beni Urrutia MD  •  bisacodyl (DULCOLAX) suppository 10 mg, 10 mg, Rectal, Daily PRN, Beni Urrutia MD  •  budesonide (PULMICORT) nebulizer solution 0.5 mg, 0.5 mg, Nebulization, BID PRN, Beni Urrutia MD  •  carvedilol (COREG) tablet 25 mg, 25 mg, Oral, BID With Meals, Beni Urrutia MD  •  ceFAZolin in 0.9% normal saline (ANCEF) IVPB solution 2 g, 2 g, Intravenous, Q8H, Elie Copeland MD  •  cloNIDine (CATAPRES) tablet 0.1 mg, 0.1 mg, Oral, BID PRN, Beni Urrutia MD  •  cyclobenzaprine (FLEXERIL) tablet 10 mg, 10 mg, Oral, TID, Beni Urrutia MD  •  diazePAM (VALIUM) tablet 2 mg, 2 mg, Oral, Q6H PRN, Beni Urrutia MD  •  docusate sodium (COLACE) capsule 100 mg, 100 mg, Oral, BID, Beni Urrutia MD  •  folic acid (FOLVITE) tablet 1 mg, 1 mg, Oral, Daily, Beni Urrutia MD  •  gabapentin (NEURONTIN) capsule 100 mg, 100 mg, Oral, Q12H, Beni Urrutia MD  •  heparin (porcine) 5000 UNIT/ML injection 5,000 Units, 5,000 Units, Subcutaneous, Q12H, Beni Urrutia MD  •  HYDROmorphone (DILAUDID) injection 1 mg, 1 mg, Intravenous, PRN, Beni Urrutia MD  •  isosorbide mononitrate (IMDUR) 24 hr tablet 60 mg, 60 mg, Oral, BID - Nitrates, Beni Urrutia MD  •  labetalol (NORMODYNE,TRANDATE) injection 20 mg, 20 mg, Intravenous, Q10 Min PRN, Beni Urrutia MD  •  levothyroxine (SYNTHROID, LEVOTHROID) tablet 75 mcg, 75 mcg, Oral, Q AM, Beni Urrutia MD  •   lidocaine (LIDODERM) 5 % 1 patch, 1 patch, Transdermal, Daily PRN, Beni Urrutia MD  •  magnesium hydroxide (MILK OF MAGNESIA) suspension 2400 mg/10mL 10 mL, 10 mL, Oral, Daily PRN, Beni Urrutia MD  •  multivitamin with minerals 1 tablet, 1 tablet, Oral, Daily, Beni Urrutia MD  •  naloxone (NARCAN) injection 0.1 mg, 0.1 mg, Intravenous, Q5 Min PRN, Beni Urrutia MD  •  nitroglycerin (NITROSTAT) SL tablet 0.4 mg, 0.4 mg, Sublingual, Q5 Min PRN, Beni Urrutia MD  •  ondansetron (ZOFRAN) tablet 4 mg, 4 mg, Oral, Q6H PRN **OR** ondansetron (ZOFRAN) injection 4 mg, 4 mg, Intravenous, Q6H PRN, Beni Urrutia MD  •  oxyCODONE-acetaminophen (PERCOCET)  MG per tablet 2 tablet, 2 tablet, Oral, Q8H, Beni Urrutia MD  •  oxyCODONE-acetaminophen (PERCOCET) 7.5-325 MG per tablet 1 tablet, 1 tablet, Oral, Q4H PRN, Beni Urrutia MD  •  pantoprazole (PROTONIX) EC tablet 40 mg, 40 mg, Oral, QAM AC, Beni Urrutia MD  •  polyethylene glycol (MIRALAX) packet 17 g, 17 g, Oral, Daily, Beni Urrutia MD  •  predniSONE (DELTASONE) tablet 5 mg, 5 mg, Oral, Daily With Breakfast, Beni Urrutia MD  •  saccharomyces boulardii (FLORASTOR) capsule 250 mg, 250 mg, Oral, BID, Beni Urrtuia MD  •  sennosides-docusate (PERICOLACE) 8.6-50 MG per tablet 2 tablet, 2 tablet, Oral, BID, Beni Urrutia MD  •  sodium chloride 0.9 % flush 10 mL, 10 mL, Intravenous, Q12H, Beni Urrutia MD  •  sodium chloride 0.9 % flush 10 mL, 10 mL, Intravenous, PRN, Beni Urrutia MD  •  valACYclovir (VALTREX) tablet 500 mg, 500 mg, Oral, Q24H, Beni Urrutia MD  •  zinc sulfate (ZINCATE) capsule 220 mg, 220 mg, Oral, Daily, Beni Urrutia MD    Data:     Code Status:    There are no questions and answers to display.       Family History   Problem Relation Age of Onset   • Cancer Mother    • Heart disease Father         Social History     Socioeconomic History   • Marital status:      Spouse name: Not on file   • Number of children: Not on file   • Years of education: Not on file   • Highest education level: Not on file   Tobacco Use   • Smoking status: Never Smoker   • Smokeless tobacco: Never Used   Substance and Sexual Activity   • Alcohol use: No   • Drug use: Never   • Sexual activity: Defer       Vitals:  Wt 108 kg (239 lb)   BMI 39.77 kg/m²   T 98.5 P 74 R 16 /57 Sp02 93% (room air)    I/O (24Hr):  No intake or output data in the 24 hours ending 03/12/21 0953    Labs and imaging:      No results found for this or any previous visit (from the past 12 hour(s)).    Physical Examination:        Physical Exam  Vitals and nursing note reviewed.   Constitutional:       Appearance: Normal appearance.   HENT:      Head: Normocephalic and atraumatic.      Right Ear: External ear normal.      Left Ear: External ear normal.      Nose: Nose normal.      Mouth/Throat:      Mouth: Mucous membranes are moist.      Pharynx: Oropharynx is clear.   Eyes:      Extraocular Movements: Extraocular movements intact.      Pupils: Pupils are equal, round, and reactive to light.   Neck:      Comments: Cervical collar in place  Incision c/d/i  Cardiovascular:      Rate and Rhythm: Normal rate and regular rhythm.      Pulses: Normal pulses.      Heart sounds: Normal heart sounds.   Pulmonary:      Effort: Pulmonary effort is normal.      Breath sounds: Normal breath sounds.   Abdominal:      General: Abdomen is flat. Bowel sounds are normal.      Palpations: Abdomen is soft.   Musculoskeletal:      Cervical back: Normal range of motion. Pain with movement present.      Comments: Generalized weakness  Limited ROM RLE   Skin:     General: Skin is warm and dry.      Capillary Refill: Capillary refill takes less than 2 seconds.      Comments: Wound vac intact   Neurological:      General: No focal deficit present.      Mental Status: She  is alert and oriented to person, place, and time.   Psychiatric:         Mood and Affect: Mood normal.         Behavior: Behavior normal.         Thought Content: Thought content normal.           Assessment:             * No active hospital problems. *    Past Medical History:   Diagnosis Date   • Arthritis    • Asthma    • Chronic sinusitis    • Coronary artery disease    • Eustachian tube dysfunction    • Heart disease    • Herpes simplex    • Hyperlipidemia    • Hypertension    • Knee dislocation    • Labral tear of right hip joint    • Laryngitis sicca    • Laryngitis, chronic    • MI (myocardial infarction) (CMS/[facility])    • Otorrhea    • Sensorineural hearing loss    • Sjogren's disease (CMS/[facility])         Plan:        1. MSSA bacteremia  2. Torn gluteus muscle s/p repair  3. Post-operative MSSA wound infection  4. Cervical spinal stenosis s/p corpectomy and decompression  5. RA  6. Chronic immunosuppression  7. Multifactorial anemia  8. Hypothyroidism  9. GERD  10. Hx CAD  11. Herpes simplex on chronic suppressive therapy  12. HTN  13. Right psoas abscess  14. L1-L2 discitis osteomyelitis  15. Constipation      Continue current treatment. Monitor counts. Increase activity as tolerated. Maintain patient safety.  Wound care per wound care team. Aggressive therapy as tolerated with activity/weightbearing restrictions per ortho recommendations.  Antibiotics under direction of ID. Continue pain control efforts and avoid oversedation. Continue bowel regimen.  Labs Monday. Xray right hip.     Electronically signed by DANIELA Hill on 3/12/2021 at 09:53 CST     I have discussed the care of Tawny Shin, including pertinent history and exam findings, with the nurse practitioner.    I have seen and examined the patient and the key elements of all parts of the encounter have been performed by me.  I agree with the assessment, plan and orders as documented by DANIELA Kyle, after I modified the exam  findings and the plan of treatments and the final version is my approved version of the assessment.        Electronically signed by Beni Urrutia MD on 3/12/2021 at 21:00 CST

## 2021-03-13 LAB
ANION GAP SERPL CALCULATED.3IONS-SCNC: 9 MMOL/L (ref 5–15)
BUN SERPL-MCNC: 8 MG/DL (ref 8–23)
BUN/CREAT SERPL: 18.6 (ref 7–25)
CALCIUM SPEC-SCNC: 8.4 MG/DL (ref 8.6–10.5)
CHLORIDE SERPL-SCNC: 102 MMOL/L (ref 98–107)
CO2 SERPL-SCNC: 31 MMOL/L (ref 22–29)
CREAT SERPL-MCNC: 0.43 MG/DL (ref 0.57–1)
GFR SERPL CREATININE-BSD FRML MDRD: 146 ML/MIN/1.73
GLUCOSE SERPL-MCNC: 95 MG/DL (ref 65–99)
POTASSIUM SERPL-SCNC: 3.6 MMOL/L (ref 3.5–5.2)
SODIUM SERPL-SCNC: 142 MMOL/L (ref 136–145)

## 2021-03-13 PROCEDURE — 97606 NEG PRS WND THER DME>50 SQCM: CPT

## 2021-03-13 PROCEDURE — 80048 BASIC METABOLIC PNL TOTAL CA: CPT | Performed by: INTERNAL MEDICINE

## 2021-03-13 PROCEDURE — 63710000001 PREDNISONE PER 5 MG: Performed by: INTERNAL MEDICINE

## 2021-03-13 PROCEDURE — 97530 THERAPEUTIC ACTIVITIES: CPT

## 2021-03-13 PROCEDURE — 25010000002 CEFAZOLIN PER 500 MG: Performed by: INTERNAL MEDICINE

## 2021-03-13 PROCEDURE — 25010000002 HEPARIN (PORCINE) PER 1000 UNITS: Performed by: INTERNAL MEDICINE

## 2021-03-13 NOTE — PROGRESS NOTES
ARGELIA Guerrero APRN Whitley Swift, APRN         Internal Medicine Progress Note    3/13/2021   15:04 CST    Name:  Tawny Shin  MRN:    [id number]     Acct:     [id number]   Room:  [address] [place] [address] Day: 0     Admit Date: 3/6/2021 11:36 AM  PCP: Davon Urrutia MD    Subjective:     C/C: Wound care and rehabilitation efforts.     Interval History: Status: stable. Up to chair. No family at bedside. Ambulated in hallway with therapy. Afebrile.  C/o continued low back pain that radiates to right hip despite adjustments to pain medication regimen. Tolerating treatment thus far. Progressing with therapy. Increased edema to BLE. States that she believes she needs her arthritis medication restarted, that it was initially stopped because the MD was wanting her blood counts to increase. Reports to therapies and nursing that she has developed a popping sensation in her right hip.     Review of Systems   Constitutional: Positive for malaise/fatigue. Negative for chills and fever.   HENT: Negative for congestion, ear discharge and sore throat.         Cervical collar in place   Eyes: Negative for blurred vision, double vision and pain.   Cardiovascular: Negative for chest pain, claudication and dyspnea on exertion.   Respiratory: Negative for cough, hemoptysis and shortness of breath.    Endocrine: Positive for heat intolerance. Negative for cold intolerance.   Hematologic/Lymphatic: Negative for adenopathy and bleeding problem.   Skin: Positive for poor wound healing. Negative for itching and rash.   Musculoskeletal: Positive for back pain, falls and joint pain.   Gastrointestinal: Positive for constipation. Negative for abdominal pain, diarrhea, nausea and vomiting.   Genitourinary: Negative for bladder incontinence, flank pain and frequency.   Neurological: Positive for weakness. Negative for difficulty with concentration.   Psychiatric/Behavioral: Negative for altered mental status,  depression and suicidal ideas.   Allergic/Immunologic: Negative for environmental allergies, HIV exposure and hives.         Medications:     Allergies:   Allergies   Allergen Reactions   • Atorvastatin Other (See Comments)     LEG CRAMPS     • Amoxicillin Rash   • Escitalopram Rash   • Nabumetone Rash   • Niacin Er Rash   • Penicillins Rash   • Simvastatin Rash       Current Meds:   Current Facility-Administered Medications:   •  acetaminophen (TYLENOL) tablet 650 mg, 650 mg, Oral, Q4H PRN **OR** acetaminophen (TYLENOL) suppository 650 mg, 650 mg, Rectal, Q4H PRN, Beni Urrutia MD  •  ascorbic acid (VITAMIN C) tablet 500 mg, 500 mg, Oral, BID, Beni Urrutia MD  •  bisacodyl (DULCOLAX) suppository 10 mg, 10 mg, Rectal, Daily PRN, Beni Urrutia MD  •  budesonide (PULMICORT) nebulizer solution 0.5 mg, 0.5 mg, Nebulization, BID PRN, Beni Urrutia MD  •  carvedilol (COREG) tablet 25 mg, 25 mg, Oral, BID With Meals, Beni Urrutia MD  •  ceFAZolin in 0.9% normal saline (ANCEF) IVPB solution 2 g, 2 g, Intravenous, Q8H, Elie Copeland MD  •  cloNIDine (CATAPRES) tablet 0.1 mg, 0.1 mg, Oral, BID PRN, Beni Urrutia MD  •  cyclobenzaprine (FLEXERIL) tablet 10 mg, 10 mg, Oral, TID, Beni Urrutia MD  •  docusate sodium (COLACE) capsule 100 mg, 100 mg, Oral, BID, Beni Urrutia MD  •  folic acid (FOLVITE) tablet 1 mg, 1 mg, Oral, Daily, Beni Urrutia MD  •  gabapentin (NEURONTIN) capsule 100 mg, 100 mg, Oral, Q12H, Beni Urrutia MD  •  heparin (porcine) 5000 UNIT/ML injection 5,000 Units, 5,000 Units, Subcutaneous, Q12H, Beni Urrutia MD  •  HYDROmorphone (DILAUDID) injection 1 mg, 1 mg, Intravenous, PRN, Beni Urrutia MD  •  isosorbide mononitrate (IMDUR) 24 hr tablet 60 mg, 60 mg, Oral, BID - Nitrates, Beni Urrutia MD  •  labetalol (NORMODYNE,TRANDATE) injection 20 mg, 20 mg, Intravenous, Q10 Min PRN, Renan,  Beni Olson MD  •  levothyroxine (SYNTHROID, LEVOTHROID) tablet 75 mcg, 75 mcg, Oral, Q AM, Beni Urrutia MD  •  lidocaine (LIDODERM) 5 % 1 patch, 1 patch, Transdermal, Daily PRN, Beni Urrutia MD  •  magnesium hydroxide (MILK OF MAGNESIA) suspension 2400 mg/10mL 10 mL, 10 mL, Oral, Daily PRN, Beni Urrutia MD  •  multivitamin with minerals 1 tablet, 1 tablet, Oral, Daily, Beni Urrutia MD  •  naloxone (NARCAN) injection 0.1 mg, 0.1 mg, Intravenous, Q5 Min PRN, Beni Urrutia MD  •  nitroglycerin (NITROSTAT) SL tablet 0.4 mg, 0.4 mg, Sublingual, Q5 Min PRN, Beni Urrutia MD  •  ondansetron (ZOFRAN) tablet 4 mg, 4 mg, Oral, Q6H PRN **OR** ondansetron (ZOFRAN) injection 4 mg, 4 mg, Intravenous, Q6H PRN, Beni Urrutia MD  •  oxyCODONE-acetaminophen (PERCOCET)  MG per tablet 2 tablet, 2 tablet, Oral, Q8H, Beni Urrutia MD  •  oxyCODONE-acetaminophen (PERCOCET) 7.5-325 MG per tablet 1 tablet, 1 tablet, Oral, Q4H PRN, Beni Urrutia MD  •  pantoprazole (PROTONIX) EC tablet 40 mg, 40 mg, Oral, QAM AC, Beni Urrutia MD  •  polyethylene glycol (MIRALAX) packet 17 g, 17 g, Oral, Daily, Beni Urrutia MD  •  predniSONE (DELTASONE) tablet 5 mg, 5 mg, Oral, Daily With Breakfast, Beni Urrutia MD  •  saccharomyces boulardii (FLORASTOR) capsule 250 mg, 250 mg, Oral, BID, Beni Urrutia MD  •  sennosides-docusate (PERICOLACE) 8.6-50 MG per tablet 2 tablet, 2 tablet, Oral, BID, Beni Urrutia MD  •  sodium chloride 0.9 % flush 10 mL, 10 mL, Intravenous, Q12H, Beni Urrutia MD  •  sodium chloride 0.9 % flush 10 mL, 10 mL, Intravenous, PRN, Beni Urrutia MD  •  valACYclovir (VALTREX) tablet 500 mg, 500 mg, Oral, Q24H, Beni Urrutia MD  •  zinc sulfate (ZINCATE) capsule 220 mg, 220 mg, Oral, Daily, Beni Urrutia MD    Data:     Code Status:    There are no questions and  "answers to display.       Family History   Problem Relation Age of Onset   • Cancer Mother    • Heart disease Father        Social History     Socioeconomic History   • Marital status:      Spouse name: Not on file   • Number of children: Not on file   • Years of education: Not on file   • Highest education level: Not on file   Tobacco Use   • Smoking status: Never Smoker   • Smokeless tobacco: Never Used   Substance and Sexual Activity   • Alcohol use: No   • Drug use: Never   • Sexual activity: Defer       Vitals:  Wt 108 kg (239 lb)   BMI 39.77 kg/m²   T 98.0 P 77 R 14 /69 Sp02 97% (room air)    I/O (24Hr):  No intake or output data in the 24 hours ending 03/13/21 1504    Labs and imaging:      Recent Results (from the past 12 hour(s))   Basic Metabolic Panel    Collection Time: 03/13/21  4:58 AM    Specimen: Blood   Result Value Ref Range    Glucose 95 65 - 99 mg/dL    BUN 8 8 - 23 mg/dL    Creatinine 0.43 (L) 0.57 - 1.00 mg/dL    Sodium 142 136 - 145 mmol/L    Potassium 3.6 3.5 - 5.2 mmol/L    Chloride 102 98 - 107 mmol/L    CO2 31.0 (H) 22.0 - 29.0 mmol/L    Calcium 8.4 (L) 8.6 - 10.5 mg/dL    eGFR Non African Amer 146 >60 mL/min/1.73    BUN/Creatinine Ratio 18.6 7.0 - 25.0    Anion Gap 9.0 5.0 - 15.0 mmol/L       XR Hip With or Without Pelvis 2 - 3 View Right    Result Date: 3/12/2021  Right hip, 2 views 3/12/2021 12:50 PM CST AP Pelvis 3/12/2021 12:50 PM CST  History: increased pain with  \"popping\" sensation  Comparison: 2/8/2021 and 2/9/2021  Findings: Frontal and lateral views of the right hip were obtained. Frontal view of the pelvis was also obtained.  Fusion hardware noted in the lower spine. SI joints are patent with degenerative change in both SI joints but RIGHT greater than LEFT. There is no acute fracture or malalignment. Joint spaces in the hips are relatively well-preserved with some mild narrowing on the RIGHT when compared to the LEFT. Enthesopathic changes are suspected at the " gluteal tendon insertion site with the greater trochanter. Probable hardware removal. Wound VAC noted.        1.  No acute osseous abnormality.  2.  Degenerative changes in the SI joints.  3.  Some enthesopathic change at the gluteal tendon insertion sites on the RIGHT.  4.  Suggestion of previous hardware removal.   This report was finalized on 03/12/2021 16:36 by Dr. Gee Farley MD.    Physical Examination:        Physical Exam  Vitals and nursing note reviewed.   Constitutional:       Appearance: Normal appearance.   HENT:      Head: Normocephalic and atraumatic.      Right Ear: External ear normal.      Left Ear: External ear normal.      Nose: Nose normal.      Mouth/Throat:      Mouth: Mucous membranes are moist.      Pharynx: Oropharynx is clear.   Eyes:      Extraocular Movements: Extraocular movements intact.      Pupils: Pupils are equal, round, and reactive to light.   Neck:      Comments: Cervical collar in place  Incision c/d/i  Cardiovascular:      Rate and Rhythm: Normal rate and regular rhythm.      Pulses: Normal pulses.      Heart sounds: Normal heart sounds.   Pulmonary:      Effort: Pulmonary effort is normal.      Breath sounds: Normal breath sounds.   Abdominal:      General: Abdomen is flat. Bowel sounds are normal.      Palpations: Abdomen is soft.   Musculoskeletal:      Cervical back: Normal range of motion. Pain with movement present.      Comments: Generalized weakness  Limited ROM RLE   Skin:     General: Skin is warm and dry.      Capillary Refill: Capillary refill takes less than 2 seconds.      Comments: Wound vac intact   Neurological:      General: No focal deficit present.      Mental Status: She is alert and oriented to person, place, and time.   Psychiatric:         Mood and Affect: Mood normal.         Behavior: Behavior normal.         Thought Content: Thought content normal.           Assessment:             * No active hospital problems. *    Past Medical History:    Diagnosis Date   • Arthritis    • Asthma    • Chronic sinusitis    • Coronary artery disease    • Eustachian tube dysfunction    • Heart disease    • Herpes simplex    • Hyperlipidemia    • Hypertension    • Knee dislocation    • Labral tear of right hip joint    • Laryngitis sicca    • Laryngitis, chronic    • MI (myocardial infarction) (CMS/HCC)    • Otorrhea    • Sensorineural hearing loss    • Sjogren's disease (CMS/[facility])         Plan:        1. MSSA bacteremia  2. Torn gluteus muscle s/p repair  3. Post-operative MSSA wound infection  4. Cervical spinal stenosis s/p corpectomy and decompression  5. RA  6. Chronic immunosuppression  7. Multifactorial anemia  8. Hypothyroidism  9. GERD  10. Hx CAD  11. Herpes simplex on chronic suppressive therapy  12. HTN  13. Right psoas abscess  14. L1-L2 discitis osteomyelitis  15. Constipation      Continue current treatment. Monitor counts. Increase activity as tolerated. Maintain patient safety.  Wound care per wound care team. Aggressive therapy as tolerated with activity/weightbearing restrictions per ortho recommendations.  Antibiotics under direction of ID. Continue pain control efforts and avoid oversedation. Continue bowel regimen.  Labs Monday. Diuresis started yesterday, continue and monitor lower extremity edema.     Electronically signed by DANIELA Bundy on 3/13/2021 at 15:04 CST

## 2021-03-13 NOTE — PROGRESS NOTES
Infectious Diseases Progress Note    Patient:  Tawny Shin  YOB: 1955  MRN: [id number]   Admit date: 3/6/2021   Admitting Physician: Beni Urrutia MD  Primary Care Physician: Davon Urrutia MD    Chief Complaint/Interval History: Her right hip is sore today.  She describes some soreness to the right groin.  Her appetite is poor.  She is not having fever.  No diarrhea or rash.  No pain at left arm PICC site.      No intake or output data in the 24 hours ending 03/13/21 1521  Allergies:   Allergies   Allergen Reactions   • Atorvastatin Other (See Comments)     LEG CRAMPS     • Amoxicillin Rash   • Escitalopram Rash   • Nabumetone Rash   • Niacin Er Rash   • Penicillins Rash   • Simvastatin Rash     Current Scheduled Medications:   ascorbic acid, 500 mg, Oral, BID  carvedilol, 25 mg, Oral, BID With Meals  ceFAZolin, 2 g, Intravenous, Q8H  cyclobenzaprine, 10 mg, Oral, TID  docusate sodium, 100 mg, Oral, BID  folic acid, 1 mg, Oral, Daily  gabapentin, 100 mg, Oral, Q12H  heparin (porcine), 5,000 Units, Subcutaneous, Q12H  isosorbide mononitrate, 60 mg, Oral, BID - Nitrates  levothyroxine, 75 mcg, Oral, Q AM  multivitamin with minerals, 1 tablet, Oral, Daily  oxyCODONE-acetaminophen, 2 tablet, Oral, Q8H  pantoprazole, 40 mg, Oral, QAM AC  polyethylene glycol, 17 g, Oral, Daily  predniSONE, 5 mg, Oral, Daily With Breakfast  saccharomyces boulardii, 250 mg, Oral, BID  senna-docusate sodium, 2 tablet, Oral, BID  sodium chloride, 10 mL, Intravenous, Q12H  valACYclovir, 500 mg, Oral, Q24H  zinc sulfate, 220 mg, Oral, Daily      Current PRN Medications:  •  acetaminophen **OR** acetaminophen  •  bisacodyl  •  budesonide  •  cloNIDine  •  HYDROmorphone  •  labetalol  •  lidocaine  •  magnesium hydroxide  •  naloxone  •  nitroglycerin  •  ondansetron **OR** ondansetron  •  oxyCODONE-acetaminophen  •  sodium chloride    Review of Systems see HPI    Vital Signs:  Wt 108 kg (239 lb)   BMI 39.77 kg/m²      Physical Exam  Vital signs - reviewed.  Line/IV (left arm PICC) site - No erythema, warmth, induration, or tenderness.  Lungs without crackles  Abdomen soft and nontender  Right hip with wound VAC in place.  No surrounding erythema.    Lab Results:  CBC:   Results from last 7 days   Lab Units 03/11/21  0500 03/08/21  1122   WBC 10*3/mm3 5.13 7.32   HEMOGLOBIN g/dL 9.1* 9.5*   HEMATOCRIT % 28.2* 30.3*   PLATELETS 10*3/mm3 176 197     BMP:  Results from last 7 days   Lab Units 03/13/21  0458 03/11/21  0500 03/08/21  1122   SODIUM mmol/L 142 137 139   POTASSIUM mmol/L 3.6 3.7 3.8   CHLORIDE mmol/L 102 103 103   CO2 mmol/L 31.0* 29.0 27.0   BUN mg/dL 8 10 7*   CREATININE mg/dL 0.43* 0.43* 0.37*   GLUCOSE mg/dL 95 94 131*   CALCIUM mg/dL 8.4* 8.7 8.6     Radiology:     Plain films right hip:  IMPRESSION:  1.  No acute osseous abnormality.  2.  Degenerative changes in the SI joints.  3.  Some enthesopathic change at the gluteal tendon insertion sites on  the RIGHT.  4.  Suggestion of previous hardware removal.  This report was finalized on 03/12/2021 16:36 by Dr. Gee Farley MD.    Additional Studies Reviewed: None    Impression:   1.  MSSA bacteremia  2.  Postoperative infection right hip following gluteus muscle repair  3.  Right psoas abscess-staph aureus (MSSA)  4.  Discitis/osteomyelitis L1/L2  5.  Cervical stenosis-she has undergone surgical decompression  6.  Rheumatoid arthritis    Recommendations:   Continue cefazolin  Continue physical therapy  If ongoing right hip pain, suggest orthopedic surgery evaluation early next week  Continue supportive care    Elie Ohara MD

## 2021-03-14 PROCEDURE — 63710000001 ONDANSETRON PER 8 MG: Performed by: INTERNAL MEDICINE

## 2021-03-14 PROCEDURE — 97530 THERAPEUTIC ACTIVITIES: CPT

## 2021-03-14 PROCEDURE — 25010000002 CEFAZOLIN PER 500 MG: Performed by: INTERNAL MEDICINE

## 2021-03-14 PROCEDURE — 63710000001 PREDNISONE PER 5 MG: Performed by: INTERNAL MEDICINE

## 2021-03-14 PROCEDURE — 97116 GAIT TRAINING THERAPY: CPT

## 2021-03-14 PROCEDURE — 25010000002 HEPARIN (PORCINE) PER 1000 UNITS: Performed by: INTERNAL MEDICINE

## 2021-03-14 PROCEDURE — 97110 THERAPEUTIC EXERCISES: CPT

## 2021-03-14 PROCEDURE — 25010000002 HYDROMORPHONE 1 MG/ML SOLUTION: Performed by: INTERNAL MEDICINE

## 2021-03-14 NOTE — PROGRESS NOTES
ARGELIA Guerrero APRN Whitley Swift, APRN         Internal Medicine Progress Note    3/14/2021   14:50 CDT    Name:  Tawny Shin  MRN:    [id number]     Acct:     [id number]   Room:  [address] [place] [address] Day: 0     Admit Date: 3/6/2021 11:36 AM  PCP: Davon Urrutia MD    Subjective:     C/C: Wound care and rehabilitation efforts.     Interval History: Status: stable. Lying in bed. No family at bedside. Participating with therapy, slow progress. Afebrile.  C/o continued low back pain that radiates to right hip despite adjustments to pain medication regimen. Tolerating treatment thus far. Increased edema to BLE. States that she believes she needs her arthritis medication restarted, that it was initially stopped because the MD was wanting her blood counts to increase.  C/o constipation for 5 days with lower abdominal and rectal pressure.       Review of Systems   Constitutional: Positive for malaise/fatigue. Negative for chills and fever.   HENT: Negative for congestion, ear discharge and sore throat.         Cervical collar in place   Eyes: Negative for blurred vision, double vision and pain.   Cardiovascular: Negative for chest pain, claudication and dyspnea on exertion.   Respiratory: Negative for cough, hemoptysis and shortness of breath.    Endocrine: Positive for heat intolerance. Negative for cold intolerance.   Hematologic/Lymphatic: Negative for adenopathy and bleeding problem.   Skin: Positive for poor wound healing. Negative for itching and rash.   Musculoskeletal: Positive for back pain, falls and joint pain.   Gastrointestinal: Positive for constipation. Negative for abdominal pain, diarrhea, nausea and vomiting.   Genitourinary: Negative for bladder incontinence, flank pain and frequency.   Neurological: Positive for weakness. Negative for difficulty with concentration.   Psychiatric/Behavioral: Negative for altered mental status, depression and suicidal ideas.    Allergic/Immunologic: Negative for environmental allergies, HIV exposure and hives.         Medications:     Allergies:   Allergies   Allergen Reactions    Atorvastatin Other (See Comments)     LEG CRAMPS      Amoxicillin Rash    Escitalopram Rash    Nabumetone Rash    Niacin Er Rash    Penicillins Rash    Simvastatin Rash       Current Meds:   Current Facility-Administered Medications:     acetaminophen (TYLENOL) tablet 650 mg, 650 mg, Oral, Q4H PRN **OR** acetaminophen (TYLENOL) suppository 650 mg, 650 mg, Rectal, Q4H PRN, Beni Urrutia MD    ascorbic acid (VITAMIN C) tablet 500 mg, 500 mg, Oral, BID, Beni Urrutia MD    bisacodyl (DULCOLAX) suppository 10 mg, 10 mg, Rectal, Daily PRN, Beni Urrutia MD    budesonide (PULMICORT) nebulizer solution 0.5 mg, 0.5 mg, Nebulization, BID PRN, Beni Urrutia MD    carvedilol (COREG) tablet 25 mg, 25 mg, Oral, BID With Meals, Beni Urrutia MD    ceFAZolin in 0.9% normal saline (ANCEF) IVPB solution 2 g, 2 g, Intravenous, Q8H, Elie Copeland MD    cloNIDine (CATAPRES) tablet 0.1 mg, 0.1 mg, Oral, BID PRN, Beni Urrutia MD    cyclobenzaprine (FLEXERIL) tablet 10 mg, 10 mg, Oral, TID, Beni Urrutia MD    docusate sodium (COLACE) capsule 100 mg, 100 mg, Oral, BID, Beni Urrutia MD    folic acid (FOLVITE) tablet 1 mg, 1 mg, Oral, Daily, Beni Urrutia MD    gabapentin (NEURONTIN) capsule 100 mg, 100 mg, Oral, Q12H, Beni Urrutia MD    heparin (porcine) 5000 UNIT/ML injection 5,000 Units, 5,000 Units, Subcutaneous, Q12H, Beni Urrutia MD    HYDROmorphone (DILAUDID) injection 1 mg, 1 mg, Intravenous, PRN, Beni Urrutia MD    isosorbide mononitrate (IMDUR) 24 hr tablet 60 mg, 60 mg, Oral, BID - Nitrates, Beni Urrutia MD    labetalol (NORMODYNE,TRANDATE) injection 20 mg, 20 mg, Intravenous, Q10 Min PRN, Beni Urrutia MD    levothyroxine (SYNTHROID,  LEVOTHROID) tablet 75 mcg, 75 mcg, Oral, Q AM, Beni Urrutia MD    lidocaine (LIDODERM) 5 % 1 patch, 1 patch, Transdermal, Daily PRN, Beni Urrutia MD    magnesium hydroxide (MILK OF MAGNESIA) suspension 2400 mg/10mL 10 mL, 10 mL, Oral, Daily PRN, Beni Urrutia MD    multivitamin with minerals 1 tablet, 1 tablet, Oral, Daily, Beni Urrutia MD    naloxone (NARCAN) injection 0.1 mg, 0.1 mg, Intravenous, Q5 Min PRN, Beni Urrutia MD    nitroglycerin (NITROSTAT) SL tablet 0.4 mg, 0.4 mg, Sublingual, Q5 Min PRN, Beni Urrutia MD    ondansetron (ZOFRAN) tablet 4 mg, 4 mg, Oral, Q6H PRN **OR** ondansetron (ZOFRAN) injection 4 mg, 4 mg, Intravenous, Q6H PRN, Beni Urrutia MD    oxyCODONE-acetaminophen (PERCOCET)  MG per tablet 2 tablet, 2 tablet, Oral, Q8H, Beni Urrutia MD    oxyCODONE-acetaminophen (PERCOCET) 7.5-325 MG per tablet 1 tablet, 1 tablet, Oral, Q4H PRN, Beni Urrutia MD    pantoprazole (PROTONIX) EC tablet 40 mg, 40 mg, Oral, QAM AC, Beni Urrutia MD    polyethylene glycol (MIRALAX) packet 17 g, 17 g, Oral, Daily, Beni Urrutia MD    predniSONE (DELTASONE) tablet 5 mg, 5 mg, Oral, Daily With Breakfast, Beni Urrutia MD    saccharomyces boulardii (FLORASTOR) capsule 250 mg, 250 mg, Oral, BID, Beni Urrutia MD    sennosides-docusate (PERICOLACE) 8.6-50 MG per tablet 2 tablet, 2 tablet, Oral, BID, Beni Urrutia MD    sodium chloride 0.9 % flush 10 mL, 10 mL, Intravenous, Q12H, Beni Urrutia MD    sodium chloride 0.9 % flush 10 mL, 10 mL, Intravenous, PRN, Beni Urrutia MD    valACYclovir (VALTREX) tablet 500 mg, 500 mg, Oral, Q24H, Beni Urrutia MD    zinc sulfate (ZINCATE) capsule 220 mg, 220 mg, Oral, Daily, Beni Urrutia MD    Data:     Code Status:    There are no questions and answers to display.       Family History   Problem Relation Age of  Onset    Cancer Mother     Heart disease Father        Social History     Socioeconomic History    Marital status:      Spouse name: Not on file    Number of children: Not on file    Years of education: Not on file    Highest education level: Not on file   Tobacco Use    Smoking status: Never Smoker    Smokeless tobacco: Never Used   Substance and Sexual Activity    Alcohol use: No    Drug use: Never    Sexual activity: Defer       Vitals:  Wt 108 kg (239 lb)   BMI 39.77 kg/m²   T 98.3 P 87 R 16 /50 Sp02 92% (room air)    I/O (24Hr):  No intake or output data in the 24 hours ending 03/14/21 1450    Labs and imaging:      No results found for this or any previous visit (from the past 12 hour(s)).        Physical Examination:        Physical Exam  Vitals and nursing note reviewed.   Constitutional:       Appearance: Normal appearance.   HENT:      Head: Normocephalic and atraumatic.      Right Ear: External ear normal.      Left Ear: External ear normal.      Nose: Nose normal.      Mouth/Throat:      Mouth: Mucous membranes are moist.      Pharynx: Oropharynx is clear.   Eyes:      Extraocular Movements: Extraocular movements intact.      Pupils: Pupils are equal, round, and reactive to light.   Neck:      Comments: Cervical collar in place  Incision c/d/i  Cardiovascular:      Rate and Rhythm: Normal rate and regular rhythm.      Pulses: Normal pulses.      Heart sounds: Normal heart sounds.   Pulmonary:      Effort: Pulmonary effort is normal.      Breath sounds: Normal breath sounds.   Abdominal:      General: Abdomen is flat. Bowel sounds are normal.      Palpations: Abdomen is soft.      Tenderness: There is abdominal tenderness.      Comments: Lower abdominal fullness, hypoactive bowel sounds   Musculoskeletal:      Cervical back: Normal range of motion. Pain with movement present.      Right lower leg: Edema present.      Left lower leg: Edema present.      Comments: Generalized weakness  Limited  ROM RLE  Mild lower extremity edema   Skin:     General: Skin is warm and dry.      Capillary Refill: Capillary refill takes less than 2 seconds.      Comments: Wound vac intact right hip   Neurological:      General: No focal deficit present.      Mental Status: She is alert and oriented to person, place, and time.   Psychiatric:         Mood and Affect: Mood normal.         Behavior: Behavior normal.         Thought Content: Thought content normal.            Assessment:             * No active hospital problems. *    Past Medical History:   Diagnosis Date    Arthritis     Asthma     Chronic sinusitis     Coronary artery disease     Eustachian tube dysfunction     Heart disease     Herpes simplex     Hyperlipidemia     Hypertension     Knee dislocation     Labral tear of right hip joint     Laryngitis sicca     Laryngitis, chronic     MI (myocardial infarction) (CMS/[facility])     Otorrhea     Sensorineural hearing loss     Sjogren's disease (CMS/[facility])         Plan:        MSSA bacteremia  Torn gluteus muscle s/p repair  Post-operative MSSA wound infection  Cervical spinal stenosis s/p corpectomy and decompression  RA  Chronic immunosuppression  Multifactorial anemia  Hypothyroidism  GERD  Hx CAD  Herpes simplex on chronic suppressive therapy  HTN  Right psoas abscess  L1-L2 discitis osteomyelitis  Constipation      Continue current treatment. Monitor counts. Increase activity as tolerated. Maintain patient safety.  Wound care per wound care team. Aggressive therapy as tolerated with activity/weightbearing restrictions per ortho recommendations.  Antibiotics under direction of ID. Continue pain control efforts and avoid oversedation. Continue bowel regimen, enema ordered.  Labs Monday. Diuresis started Friday, continue and monitor lower extremity edema.     Electronically signed by DANIELA Bundy on 3/14/2021 at 14:50 CDT   I have discussed the care of Tawny Shin, including pertinent history and exam findings,  with the nurse practitioner.    I have seen and examined the patient and the key elements of all parts of the encounter have been performed by me.  I agree with the assessment, plan and orders as documented by Autumn SUAREZ, after I modified the exam findings and the plan of treatments and the final version is my approved version of the assessment.        Electronically signed by Beni Urrutia MD on 3/14/2021 at 19:12 CDT

## 2021-03-15 VITALS — BODY MASS INDEX: 38.24 KG/M2 | WEIGHT: 229.8 LBS

## 2021-03-15 LAB
ANION GAP SERPL CALCULATED.3IONS-SCNC: 8 MMOL/L (ref 5–15)
BASOPHILS # BLD AUTO: 0.05 10*3/MM3 (ref 0–0.2)
BASOPHILS NFR BLD AUTO: 0.8 % (ref 0–1.5)
BUN SERPL-MCNC: 10 MG/DL (ref 8–23)
BUN/CREAT SERPL: 22.7 (ref 7–25)
CALCIUM SPEC-SCNC: 9.3 MG/DL (ref 8.6–10.5)
CHLORIDE SERPL-SCNC: 98 MMOL/L (ref 98–107)
CO2 SERPL-SCNC: 33 MMOL/L (ref 22–29)
CREAT SERPL-MCNC: 0.44 MG/DL (ref 0.57–1)
CRP SERPL-MCNC: 14.54 MG/DL (ref 0–0.5)
DEPRECATED RDW RBC AUTO: 51.7 FL (ref 37–54)
EOSINOPHIL # BLD AUTO: 0.19 10*3/MM3 (ref 0–0.4)
EOSINOPHIL NFR BLD AUTO: 3 % (ref 0.3–6.2)
ERYTHROCYTE [DISTWIDTH] IN BLOOD BY AUTOMATED COUNT: 15.8 % (ref 12.3–15.4)
ERYTHROCYTE [SEDIMENTATION RATE] IN BLOOD: 23 MM/HR (ref 0–20)
GFR SERPL CREATININE-BSD FRML MDRD: 143 ML/MIN/1.73
GLUCOSE SERPL-MCNC: 110 MG/DL (ref 65–99)
HCT VFR BLD AUTO: 30.7 % (ref 34–46.6)
HGB BLD-MCNC: 9.6 G/DL (ref 12–15.9)
IMM GRANULOCYTES # BLD AUTO: 0.06 10*3/MM3 (ref 0–0.05)
IMM GRANULOCYTES NFR BLD AUTO: 0.9 % (ref 0–0.5)
LYMPHOCYTES # BLD AUTO: 1.28 10*3/MM3 (ref 0.7–3.1)
LYMPHOCYTES NFR BLD AUTO: 19.9 % (ref 19.6–45.3)
MCH RBC QN AUTO: 28.4 PG (ref 26.6–33)
MCHC RBC AUTO-ENTMCNC: 31.3 G/DL (ref 31.5–35.7)
MCV RBC AUTO: 90.8 FL (ref 79–97)
MONOCYTES # BLD AUTO: 0.69 10*3/MM3 (ref 0.1–0.9)
MONOCYTES NFR BLD AUTO: 10.7 % (ref 5–12)
NEUTROPHILS NFR BLD AUTO: 4.15 10*3/MM3 (ref 1.7–7)
NEUTROPHILS NFR BLD AUTO: 64.7 % (ref 42.7–76)
NRBC BLD AUTO-RTO: 0 /100 WBC (ref 0–0.2)
PLATELET # BLD AUTO: 239 10*3/MM3 (ref 140–450)
PMV BLD AUTO: 10.3 FL (ref 6–12)
POTASSIUM SERPL-SCNC: 4.5 MMOL/L (ref 3.5–5.2)
RBC # BLD AUTO: 3.38 10*6/MM3 (ref 3.77–5.28)
SODIUM SERPL-SCNC: 139 MMOL/L (ref 136–145)
WBC # BLD AUTO: 6.42 10*3/MM3 (ref 3.4–10.8)

## 2021-03-15 PROCEDURE — 97530 THERAPEUTIC ACTIVITIES: CPT

## 2021-03-15 PROCEDURE — 85025 COMPLETE CBC W/AUTO DIFF WBC: CPT | Performed by: INTERNAL MEDICINE

## 2021-03-15 PROCEDURE — 85651 RBC SED RATE NONAUTOMATED: CPT | Performed by: INTERNAL MEDICINE

## 2021-03-15 PROCEDURE — 97535 SELF CARE MNGMENT TRAINING: CPT

## 2021-03-15 PROCEDURE — 80048 BASIC METABOLIC PNL TOTAL CA: CPT | Performed by: INTERNAL MEDICINE

## 2021-03-15 PROCEDURE — 99024 POSTOP FOLLOW-UP VISIT: CPT | Performed by: NURSE PRACTITIONER

## 2021-03-15 PROCEDURE — 97116 GAIT TRAINING THERAPY: CPT

## 2021-03-15 PROCEDURE — 86140 C-REACTIVE PROTEIN: CPT | Performed by: INTERNAL MEDICINE

## 2021-03-15 PROCEDURE — 63710000001 PREDNISONE PER 5 MG: Performed by: INTERNAL MEDICINE

## 2021-03-15 PROCEDURE — 25010000002 HEPARIN (PORCINE) PER 1000 UNITS: Performed by: INTERNAL MEDICINE

## 2021-03-15 PROCEDURE — 25010000002 CEFAZOLIN PER 500 MG: Performed by: INTERNAL MEDICINE

## 2021-03-15 PROCEDURE — 97110 THERAPEUTIC EXERCISES: CPT

## 2021-03-15 NOTE — PROGRESS NOTES
LT[facility] Fall Assessment Note    Tawny Shin is a 65 y.o.female  [Ht:  ; Wt: 104 kg (229 lb 12.8 oz)] admitted 3/6/2021 11:36 AM.    Current medications associated with an increased risk for fall include:  Carvedilol  Cyclobenzaprine  Gabapentin  Isosorbide mononitrate  Percocet  Clonidine  Hydromorphone  Labetalol  Nitroglycerin  Ondansetron  Percocet    L Jesus Bee [facility]  03/15/2112:53 CDT

## 2021-03-15 NOTE — PROGRESS NOTES
ARGELIA Guerrero APRN          Internal Medicine Progress Note    3/15/2021   10:51 CDT    Name:  Tawny Shin  MRN:    [id number]     Acct:     [id number]   Room:  [address] [place] [address] Day: 0     Admit Date: 3/6/2021 11:36 AM  PCP: Davon Urrutia MD    Subjective:     C/C: Wound care and rehabilitation efforts.     Interval History: Status: stable.Up to chair.  No family at bedside. Participating with therapy, slow progress. Afebrile.  C/o continued low back pain that radiates to right hip despite adjustments to pain medication regimen. Tolerating treatment thus far. Increased edema to BLE. Discussing discharge plan - would consider [facility] if appropriate. Cannot find TLSO brace.       Review of Systems   Constitutional: Positive for malaise/fatigue. Negative for chills, decreased appetite, fever, weight gain and weight loss.   HENT: Negative for congestion, ear discharge, hoarse voice, sore throat and tinnitus.         Cervical collar in place   Eyes: Negative for blurred vision, discharge, double vision, pain, visual disturbance and visual halos.   Cardiovascular: Negative for chest pain, claudication, dyspnea on exertion, irregular heartbeat, leg swelling, orthopnea and paroxysmal nocturnal dyspnea.   Respiratory: Negative for cough, hemoptysis, shortness of breath, sputum production and wheezing.    Endocrine: Positive for heat intolerance. Negative for cold intolerance and polyuria.   Hematologic/Lymphatic: Negative for adenopathy and bleeding problem. Does not bruise/bleed easily.   Skin: Positive for poor wound healing. Negative for dry skin, itching, rash and suspicious lesions.   Musculoskeletal: Positive for back pain, falls and joint pain. Negative for arthritis, muscle weakness and myalgias.   Gastrointestinal: Positive for constipation. Negative for abdominal pain, diarrhea, dysphagia, hematemesis, nausea and vomiting.   Genitourinary: Negative for bladder  incontinence, dysuria, flank pain and frequency.   Neurological: Positive for weakness. Negative for aphonia, difficulty with concentration, disturbances in coordination and dizziness.   Psychiatric/Behavioral: Negative for altered mental status, depression, memory loss, substance abuse and suicidal ideas. The patient does not have insomnia and is not nervous/anxious.    Allergic/Immunologic: Negative for environmental allergies, HIV exposure and hives.         Medications:     Allergies:   Allergies   Allergen Reactions   • Atorvastatin Other (See Comments)     LEG CRAMPS     • Amoxicillin Rash   • Escitalopram Rash   • Nabumetone Rash   • Niacin Er Rash   • Penicillins Rash   • Simvastatin Rash       Current Meds:   Current Facility-Administered Medications:   •  acetaminophen (TYLENOL) tablet 650 mg, 650 mg, Oral, Q4H PRN **OR** acetaminophen (TYLENOL) suppository 650 mg, 650 mg, Rectal, Q4H PRN, Beni Urrutia MD  •  ascorbic acid (VITAMIN C) tablet 500 mg, 500 mg, Oral, BID, Beni Urrutia MD  •  bisacodyl (DULCOLAX) suppository 10 mg, 10 mg, Rectal, Daily PRN, Beni Urrutia MD  •  budesonide (PULMICORT) nebulizer solution 0.5 mg, 0.5 mg, Nebulization, BID PRN, Beni Urrutia MD  •  carvedilol (COREG) tablet 25 mg, 25 mg, Oral, BID With Meals, Beni Urrutia MD  •  ceFAZolin in 0.9% normal saline (ANCEF) IVPB solution 2 g, 2 g, Intravenous, Q8H, Elie Copeland MD  •  cloNIDine (CATAPRES) tablet 0.1 mg, 0.1 mg, Oral, BID PRN, Beni Urrutia MD  •  cyclobenzaprine (FLEXERIL) tablet 10 mg, 10 mg, Oral, TID, Beni Urrutia MD  •  docusate sodium (COLACE) capsule 100 mg, 100 mg, Oral, BID, Beni Urrutia MD  •  folic acid (FOLVITE) tablet 1 mg, 1 mg, Oral, Daily, Beni Urrutia MD  •  gabapentin (NEURONTIN) capsule 100 mg, 100 mg, Oral, Q12H, Beni Urrutia MD  •  heparin (porcine) 5000 UNIT/ML injection 5,000 Units, 5,000 Units,  Subcutaneous, Q12H, Beni Urrutia MD  •  HYDROmorphone (DILAUDID) injection 1 mg, 1 mg, Intravenous, PRN, Beni Urrutia MD  •  isosorbide mononitrate (IMDUR) 24 hr tablet 60 mg, 60 mg, Oral, BID - Nitrates, Beni Urrutia MD  •  labetalol (NORMODYNE,TRANDATE) injection 20 mg, 20 mg, Intravenous, Q10 Min PRN, Beni Urrutia MD  •  levothyroxine (SYNTHROID, LEVOTHROID) tablet 75 mcg, 75 mcg, Oral, Q AM, Beni Urrutia MD  •  lidocaine (LIDODERM) 5 % 1 patch, 1 patch, Transdermal, Daily PRN, Beni Urrutia MD  •  magnesium hydroxide (MILK OF MAGNESIA) suspension 2400 mg/10mL 10 mL, 10 mL, Oral, Daily PRN, Beni Urrutia MD  •  multivitamin with minerals 1 tablet, 1 tablet, Oral, Daily, Beni Urrutia MD  •  naloxone (NARCAN) injection 0.1 mg, 0.1 mg, Intravenous, Q5 Min PRN, Beni Urrutia MD  •  nitroglycerin (NITROSTAT) SL tablet 0.4 mg, 0.4 mg, Sublingual, Q5 Min PRN, Beni Urrutia MD  •  ondansetron (ZOFRAN) tablet 4 mg, 4 mg, Oral, Q6H PRN **OR** ondansetron (ZOFRAN) injection 4 mg, 4 mg, Intravenous, Q6H PRN, Beni Urrutia MD  •  oxyCODONE-acetaminophen (PERCOCET)  MG per tablet 2 tablet, 2 tablet, Oral, Q8H, Beni Urrutia MD  •  oxyCODONE-acetaminophen (PERCOCET) 7.5-325 MG per tablet 1 tablet, 1 tablet, Oral, Q4H PRN, Beni Urrutia MD  •  pantoprazole (PROTONIX) EC tablet 40 mg, 40 mg, Oral, QAM AC, Beni Urrutia MD  •  polyethylene glycol (MIRALAX) packet 17 g, 17 g, Oral, Daily, Beni Urrutia MD  •  predniSONE (DELTASONE) tablet 5 mg, 5 mg, Oral, Daily With Breakfast, Beni Urrutia MD  •  saccharomyces boulardii (FLORASTOR) capsule 250 mg, 250 mg, Oral, BID, Beni Urrutia MD  •  sennosides-docusate (PERICOLACE) 8.6-50 MG per tablet 2 tablet, 2 tablet, Oral, BID, Beni Urrutia MD  •  sodium chloride 0.9 % flush 10 mL, 10 mL, Intravenous, Q12H, Renan  Beni Olson MD  •  sodium chloride 0.9 % flush 10 mL, 10 mL, Intravenous, PRN, Beni Urrutia MD  •  valACYclovir (VALTREX) tablet 500 mg, 500 mg, Oral, Q24H, Beni Urrutia MD  •  zinc sulfate (ZINCATE) capsule 220 mg, 220 mg, Oral, Daily, Beni Urrutia MD    Data:     Code Status:    There are no questions and answers to display.       Family History   Problem Relation Age of Onset   • Cancer Mother    • Heart disease Father        Social History     Socioeconomic History   • Marital status:      Spouse name: Not on file   • Number of children: Not on file   • Years of education: Not on file   • Highest education level: Not on file   Tobacco Use   • Smoking status: Never Smoker   • Smokeless tobacco: Never Used   Substance and Sexual Activity   • Alcohol use: No   • Drug use: Never   • Sexual activity: Defer       Vitals:  Wt 104 kg (229 lb 12.8 oz)   BMI 38.24 kg/m²   T 97.8 P 85 R 24 /54 Sp02 88% (room air)    I/O (24Hr):  No intake or output data in the 24 hours ending 03/15/21 1051    Labs and imaging:      Recent Results (from the past 12 hour(s))   Basic Metabolic Panel    Collection Time: 03/15/21  5:20 AM    Specimen: Blood   Result Value Ref Range    Glucose 110 (H) 65 - 99 mg/dL    BUN 10 8 - 23 mg/dL    Creatinine 0.44 (L) 0.57 - 1.00 mg/dL    Sodium 139 136 - 145 mmol/L    Potassium 4.5 3.5 - 5.2 mmol/L    Chloride 98 98 - 107 mmol/L    CO2 33.0 (H) 22.0 - 29.0 mmol/L    Calcium 9.3 8.6 - 10.5 mg/dL    eGFR Non African Amer 143 >60 mL/min/1.73    BUN/Creatinine Ratio 22.7 7.0 - 25.0    Anion Gap 8.0 5.0 - 15.0 mmol/L   Sedimentation Rate    Collection Time: 03/15/21  5:20 AM    Specimen: Blood   Result Value Ref Range    Sed Rate 23 (H) 0 - 20 mm/hr   C-reactive Protein    Collection Time: 03/15/21  5:20 AM    Specimen: Blood   Result Value Ref Range    C-Reactive Protein 14.54 (H) 0.00 - 0.50 mg/dL   CBC Auto Differential    Collection Time: 03/15/21  5:20 AM     Specimen: Blood   Result Value Ref Range    WBC 6.42 3.40 - 10.80 10*3/mm3    RBC 3.38 (L) 3.77 - 5.28 10*6/mm3    Hemoglobin 9.6 (L) 12.0 - 15.9 g/dL    Hematocrit 30.7 (L) 34.0 - 46.6 %    MCV 90.8 79.0 - 97.0 fL    MCH 28.4 26.6 - 33.0 pg    MCHC 31.3 (L) 31.5 - 35.7 g/dL    RDW 15.8 (H) 12.3 - 15.4 %    RDW-SD 51.7 37.0 - 54.0 fl    MPV 10.3 6.0 - 12.0 fL    Platelets 239 140 - 450 10*3/mm3    Neutrophil % 64.7 42.7 - 76.0 %    Lymphocyte % 19.9 19.6 - 45.3 %    Monocyte % 10.7 5.0 - 12.0 %    Eosinophil % 3.0 0.3 - 6.2 %    Basophil % 0.8 0.0 - 1.5 %    Immature Grans % 0.9 (H) 0.0 - 0.5 %    Neutrophils, Absolute 4.15 1.70 - 7.00 10*3/mm3    Lymphocytes, Absolute 1.28 0.70 - 3.10 10*3/mm3    Monocytes, Absolute 0.69 0.10 - 0.90 10*3/mm3    Eosinophils, Absolute 0.19 0.00 - 0.40 10*3/mm3    Basophils, Absolute 0.05 0.00 - 0.20 10*3/mm3    Immature Grans, Absolute 0.06 (H) 0.00 - 0.05 10*3/mm3    nRBC 0.0 0.0 - 0.2 /100 WBC           Physical Examination:        Physical Exam  Vitals and nursing note reviewed.   Constitutional:       Appearance: Normal appearance.   HENT:      Head: Normocephalic and atraumatic.      Right Ear: External ear normal.      Left Ear: External ear normal.      Nose: Nose normal.      Mouth/Throat:      Mouth: Mucous membranes are moist.      Pharynx: Oropharynx is clear.   Eyes:      Extraocular Movements: Extraocular movements intact.      Pupils: Pupils are equal, round, and reactive to light.   Neck:      Comments: Cervical collar in place  Incision c/d/i  Cardiovascular:      Rate and Rhythm: Normal rate and regular rhythm.      Pulses: Normal pulses.      Heart sounds: Normal heart sounds.   Pulmonary:      Effort: Pulmonary effort is normal.      Breath sounds: Normal breath sounds.   Abdominal:      General: Abdomen is flat. Bowel sounds are normal.      Palpations: Abdomen is soft.      Tenderness: There is abdominal tenderness.      Comments: Lower abdominal fullness,  hypoactive bowel sounds   Musculoskeletal:      Cervical back: Normal range of motion. Pain with movement present.      Right lower leg: Edema present.      Left lower leg: Edema present.      Comments: Generalized weakness  Limited ROM RLE  Mild lower extremity edema   Skin:     General: Skin is warm and dry.      Capillary Refill: Capillary refill takes less than 2 seconds.      Comments: Wound vac intact right hip   Neurological:      General: No focal deficit present.      Mental Status: She is alert and oriented to person, place, and time.   Psychiatric:         Mood and Affect: Mood normal.         Behavior: Behavior normal.         Thought Content: Thought content normal.            Assessment:             * No active hospital problems. *    Past Medical History:   Diagnosis Date   • Arthritis    • Asthma    • Chronic sinusitis    • Coronary artery disease    • Eustachian tube dysfunction    • Heart disease    • Herpes simplex    • Hyperlipidemia    • Hypertension    • Knee dislocation    • Labral tear of right hip joint    • Laryngitis sicca    • Laryngitis, chronic    • MI (myocardial infarction) (CMS/HCC)    • Otorrhea    • Sensorineural hearing loss    • Sjogren's disease (CMS/[facility])         Plan:        1. MSSA bacteremia  2. Torn gluteus muscle s/p repair  3. Post-operative MSSA wound infection  4. Cervical spinal stenosis s/p corpectomy and decompression  5. RA  6. Chronic immunosuppression  7. Multifactorial anemia  8. Hypothyroidism  9. GERD  10. Hx CAD  11. Herpes simplex on chronic suppressive therapy  12. HTN  13. Right psoas abscess  14. L1-L2 discitis osteomyelitis  15. Constipation      Continue current treatment. Monitor counts. Increase activity as tolerated. Maintain patient safety.  Wound care per wound care team. Aggressive therapy as tolerated with activity/weightbearing restrictions per ortho recommendations.  Antibiotics under direction of ID. Continue pain control efforts and avoid  oversedation. Continue bowel regimen.  Labs Thursday. Continue gentle diuresis. Discharge planning.     Electronically signed by DANIELA Hill on 3/15/2021 at 10:51 CDT   I have discussed the care of Tawny Shin, including pertinent history and exam findings, with the nurse practitioner.    I have seen and examined the patient and the key elements of all parts of the encounter have been performed by me.  I agree with the assessment, plan and orders as documented by DANIELA Kyle, after I modified the exam findings and the plan of treatments and the final version is my approved version of the assessment.        Electronically signed by Beni Urrutia MD on 3/15/2021 at 19:32 CDT

## 2021-03-15 NOTE — PROGRESS NOTES
NEUROSURGERY DAILY PROGRESS NOTE    ASSESSMENT:   Tawny Shin is a 65 y.o. with a significant comorbidity rheumatoid arthritis on disease modifying agents, prior lumbar fusion by Dr. Oropeza in 2018.  She presents with a new problem of postop surgical infection of her right hip with wound VAC dressing, progressive right lower extremity proximal weakness without numbness or radiculopathy. Physical exam findings of right iliopsoas weakness, and global hyperreflexia with Babinski and right Patience's and decreased  strength bilaterally.  Their imaging shows CT of the cervical spine with severe cervical stenosis at severe stenosis C5-6 and C6/7 and adjacent segment disease at L1/2 and L2/3 with concern for osteomyelitis discitis.    Past Medical History:   Diagnosis Date   • Arthritis    • Asthma    • Chronic sinusitis    • Coronary artery disease    • Eustachian tube dysfunction    • Heart disease    • Herpes simplex    • Hyperlipidemia    • Hypertension    • Knee dislocation    • Labral tear of right hip joint    • Laryngitis sicca    • Laryngitis, chronic    • MI (myocardial infarction) (CMS/HCC)    • Otorrhea    • Sensorineural hearing loss    • Sjogren's disease (CMS/HCC)      There are no active hospital problems to display for this patient.    PLAN:   Neuro: Stable/unchanged.  Improved  strength.  Improved right lower extremity strength     Cervical stenosis    POD #12 C6 corpectomy   GUILLERMO DC'd   Cervical collar in place.  Trachea midline.  No difficulty with swallowing   Postop x-rays stable   Continue neurochecks every 4 hours   Ready to discharge from a neurosurgical perspective   Neurosurgery will continue to follow     Right psoas abscess   I&D right psoas abscess performed per IR (3/2/2021)   Body fluid culture: 4+ WBCs, rare 1+ gram-positive cocci   Light growth 2+ Staph aureus   Continue antibiotics per ID    Concern for discitis/osteomyelitis L1/2  L1/2 severe central canal stenosis  Severe  stenosis from adjacent segment disease   We will continue to monitor for now   May require L1 laminectomy with possible discectomy, foraminotomy, and debridement in the near future   Continue antibiotics per ID   TLSO brace when out of bed     CV: JADEN.  VS WNL  Pulm: No acute issues.  Maintaining O2 sat.  Continuous pulse oximetry   : Voiding spontaneously.  Bladder scans and I/O cath per policy  FEN: Cleared for oral diet.  Meds in apple sauce.   GI: Prophylaxis  ORTHO: Open wound right posterior hip.  MSSA infection, wound vac per PT   3/4/2021: Scant growth Staphylococcus aureus        Scant growth Enterococcus     ID: MSSA bacteria and Enterococcus.  Postop vancomycin and cefazolin   Then continue cafazolin   CRP trending upward, 6-14.53.  Consider repeat imaging, MRI of the lumbar spine with and without contrast.  Will defer repeat imaging to ID's recommendations.  ORTHO: Heme:  DVT prophylaxis; SCD's  Pain: Tolerable at present, DC PCA and switch to oral meds   Dispo: PT/OT.      Max ambulation allowable by Ortho.   Ready for discharge from neurosurgical perspective    CHIEF COMPLAINT:   Right-sided lumbar back pain with a new complaint of right anterior thigh pain    Subjective  Symptom stable.     Objective:  General Appearance:  Comfortable, well-appearing and in no acute distress.    Vital signs: (most recent): Weight 104 kg (229 lb 12.8 oz), not currently breastfeeding.      Neurologic Exam     Mental Status   Oriented to person, place, and time.   Attention: normal. Concentration: normal.   Speech: speech is normal   Level of consciousness: alert    Bright and awake.  Oriented x3.  Follows commands without prompting showing thumbs up into fingers bilaterally.     Cranial Nerves     CN II   Visual fields full to confrontation.     CN III, IV, VI   Pupils are equal, round, and reactive to light.  Extraocular motions are normal.     CN V   Facial sensation intact.     CN VII   Facial expression full,  symmetric.     CN VIII   CN VIII normal.     CN IX, X   CN IX normal.     CN XI   CN XI normal.     Motor Exam     Strength   Right deltoid: 5/5  Left deltoid: 5/5  Right biceps: 5/5  Left biceps: 5/5  Right triceps: 5/5  Left triceps: 5/5  Right wrist extension: 5/5  Left wrist extension: 5/5  Right interossei: 4/5  Left interossei: 4/5  Right iliopsoas: 2/5  Left iliopsoas: 5/5  Right quadriceps: 2/5  Left quadriceps: 5/5  Right anterior tibial: 3/5  Left anterior tibial: 5/5  Right gastroc: 4/5  Left gastroc: 5/5       Sensory Exam   Sensory deficit distribution on right: L1    Gait, Coordination, and Reflexes     Tremor   Resting tremor: absent  Intention tremor: absent  Action tremor: absent    Reflexes   Right plantar: upgoing  Left plantar: normal  Right Mims: present  Left Mims: absent  Right ankle clonus: present  Left ankle clonus: present  Right pendular knee jerk: absent  Left pendular knee jerk: absent    Incision: C, D, I    Drains: * No LDAs found *    Imaging Results (Last 24 Hours)     ** No results found for the last 24 hours. **        Lab Results (last 24 hours)     Procedure Component Value Units Date/Time    Basic Metabolic Panel [[id number]]  (Abnormal) Collected: 03/15/21 0520    Specimen: Blood Updated: 03/15/21 0612     Glucose 110 mg/dL      BUN 10 mg/dL      Creatinine 0.44 mg/dL      Sodium 139 mmol/L      Potassium 4.5 mmol/L      Chloride 98 mmol/L      CO2 33.0 mmol/L      Calcium 9.3 mg/dL      eGFR Non African Amer 143 mL/min/1.73      BUN/Creatinine Ratio 22.7     Anion Gap 8.0 mmol/L     Narrative:      GFR Normal >60  Chronic Kidney Disease <60  Kidney Failure <15      C-reactive Protein [[id number]]  (Abnormal) Collected: 03/15/21 0520    Specimen: Blood Updated: 03/15/21 0606     C-Reactive Protein 14.54 mg/dL     Sedimentation Rate [[id number]]  (Abnormal) Collected: 03/15/21 0520    Specimen: Blood Updated: 03/15/21 0601     Sed Rate 23 mm/hr     CBC & Differential  [[id number]]  (Abnormal) Collected: 03/15/21 0520    Specimen: Blood Updated: 03/15/21 0540    Narrative:      The following orders were created for panel order CBC & Differential.  Procedure                               Abnormality         Status                     ---------                               -----------         ------                     CBC Auto Differential[[id number]]        Abnormal            Final result                 Please view results for these tests on the individual orders.    CBC Auto Differential [[id number]]  (Abnormal) Collected: 03/15/21 0520    Specimen: Blood Updated: 03/15/21 0540     WBC 6.42 10*3/mm3      RBC 3.38 10*6/mm3      Hemoglobin 9.6 g/dL      Hematocrit 30.7 %      MCV 90.8 fL      MCH 28.4 pg      MCHC 31.3 g/dL      RDW 15.8 %      RDW-SD 51.7 fl      MPV 10.3 fL      Platelets 239 10*3/mm3      Neutrophil % 64.7 %      Lymphocyte % 19.9 %      Monocyte % 10.7 %      Eosinophil % 3.0 %      Basophil % 0.8 %      Immature Grans % 0.9 %      Neutrophils, Absolute 4.15 10*3/mm3      Lymphocytes, Absolute 1.28 10*3/mm3      Monocytes, Absolute 0.69 10*3/mm3      Eosinophils, Absolute 0.19 10*3/mm3      Basophils, Absolute 0.05 10*3/mm3      Immature Grans, Absolute 0.06 10*3/mm3      nRBC 0.0 /100 WBC         Taiwo Prado, APRN

## 2021-03-16 PROCEDURE — 25010000002 HYDROMORPHONE 1 MG/ML SOLUTION: Performed by: INTERNAL MEDICINE

## 2021-03-16 PROCEDURE — 97530 THERAPEUTIC ACTIVITIES: CPT

## 2021-03-16 PROCEDURE — 25010000002 CEFAZOLIN PER 500 MG: Performed by: INTERNAL MEDICINE

## 2021-03-16 PROCEDURE — 97606 NEG PRS WND THER DME>50 SQCM: CPT | Performed by: PHYSICAL THERAPIST

## 2021-03-16 PROCEDURE — 97535 SELF CARE MNGMENT TRAINING: CPT

## 2021-03-16 PROCEDURE — 63710000001 PREDNISONE PER 5 MG: Performed by: INTERNAL MEDICINE

## 2021-03-16 PROCEDURE — 25010000002 HEPARIN (PORCINE) PER 1000 UNITS: Performed by: INTERNAL MEDICINE

## 2021-03-16 NOTE — PROGRESS NOTES
Infectious Diseases Progress Note    Patient:  Tawny Shin  YOB: 1955  MRN: [id number]   Admit date: 3/6/2021   Admitting Physician: Beni Urrutia MD  Primary Care Physician: Davon Urrutia MD    Chief Complaint/Interval History: She has right hip discomfort.  She indicates the right hip is more sore than the low back.  She is not having fevers or chills.  No pain at her PICC line site.  She has not had diarrhea.  She did have loose stool after medicines to relieve constipation.  She does not have pain at PICC line site.    No intake or output data in the 24 hours ending 03/16/21 1507  Allergies:   Allergies   Allergen Reactions   • Atorvastatin Other (See Comments)     LEG CRAMPS     • Amoxicillin Rash   • Escitalopram Rash   • Nabumetone Rash   • Niacin Er Rash   • Penicillins Rash   • Simvastatin Rash     Current Scheduled Medications:   ascorbic acid, 500 mg, Oral, BID  carvedilol, 25 mg, Oral, BID With Meals  ceFAZolin, 2 g, Intravenous, Q8H  cyclobenzaprine, 10 mg, Oral, TID  docusate sodium, 100 mg, Oral, BID  folic acid, 1 mg, Oral, Daily  gabapentin, 100 mg, Oral, Q12H  heparin (porcine), 5,000 Units, Subcutaneous, Q12H  isosorbide mononitrate, 60 mg, Oral, BID - Nitrates  levothyroxine, 75 mcg, Oral, Q AM  multivitamin with minerals, 1 tablet, Oral, Daily  oxyCODONE-acetaminophen, 2 tablet, Oral, Q8H  pantoprazole, 40 mg, Oral, QAM AC  polyethylene glycol, 17 g, Oral, Daily  predniSONE, 5 mg, Oral, Daily With Breakfast  saccharomyces boulardii, 250 mg, Oral, BID  senna-docusate sodium, 2 tablet, Oral, BID  sodium chloride, 10 mL, Intravenous, Q12H  valACYclovir, 500 mg, Oral, Q24H  zinc sulfate, 220 mg, Oral, Daily      Current PRN Medications:  •  acetaminophen **OR** acetaminophen  •  bisacodyl  •  budesonide  •  cloNIDine  •  HYDROmorphone  •  labetalol  •  lidocaine  •  magnesium hydroxide  •  naloxone  •  nitroglycerin  •  ondansetron **OR** ondansetron  •   oxyCODONE-acetaminophen  •  sodium chloride    Review of Systems see HPI    Vital Signs:  Wt 104 kg (229 lb 12.8 oz)   BMI 38.24 kg/m²     Physical Exam  Vital signs - reviewed.  Line/IV (PICC) site - No erythema, warmth, induration, or tenderness.  Lungs without crackles  Heart without murmur  Right hip with wound VAC in place    Lab Results:  CBC:   Results from last 7 days   Lab Units 03/15/21  0520 03/11/21  0500   WBC 10*3/mm3 6.42 5.13   HEMOGLOBIN g/dL 9.6* 9.1*   HEMATOCRIT % 30.7* 28.2*   PLATELETS 10*3/mm3 239 176     BMP:  Results from last 7 days   Lab Units 03/15/21  0520 03/13/21  0458 03/11/21  0500   SODIUM mmol/L 139 142 137   POTASSIUM mmol/L 4.5 3.6 3.7   CHLORIDE mmol/L 98 102 103   CO2 mmol/L 33.0* 31.0* 29.0   BUN mg/dL 10 8 10   CREATININE mg/dL 0.44* 0.43* 0.43*   GLUCOSE mg/dL 110* 95 94   CALCIUM mg/dL 9.3 8.4* 8.7     C-reactive protein initially 14.5    Radiology: None  Additional Studies Reviewed: None    Impression:   1.  MSSA bacteremia  2.  Postoperative infection right hip following gluteus muscle repair  3.  Right psoas abscess-staph aureus (MSSA)  4.  Discitis/osteomyelitis L1/L2  5.  Cervical stenosis-she underwent surgical decompression  6.  Rheumatoid arthritis-may be responsible for her ongoing elevation in CRP as opposed to infection being the cause    Recommendations:   Continue cefazolin  Continue physical therapy  Would ask ask Ortho to assess right hip  Would like you to follow-up MRI with gadolinium lumbar spine/psoas this week to evaluate for any recurrent/persistent paraspinal abscess    Elie Ohara MD

## 2021-03-16 NOTE — PROGRESS NOTES
ARGELIA Guerrero APRN          Internal Medicine Progress Note    3/16/2021   13:06 CDT    Name:  Tawny Shin  MRN:    [id number]     Acct:     [id number]   Room:  [address] [place] [address] Day: 0     Admit Date: 3/6/2021 11:36 AM  PCP: Davon Urrutia MD    Subjective:     C/C: Wound care and rehabilitation efforts.     Interval History: Status: stable. Resting in bed. Returned from shower with therapy.   No family at bedside. C/o increased right hip pain today - impeding therapy. Afebrile. Anticipating further wound care later today. Discharge planning underway - patient hopeful for discharge to acute rehab, but unsure if she can tolerate 3 hours of therapy daily.       Review of Systems   Constitutional: Positive for malaise/fatigue. Negative for chills, decreased appetite, fever, weight gain and weight loss.   HENT: Negative for congestion, ear discharge, hoarse voice, sore throat and tinnitus.         Cervical collar in place   Eyes: Negative for blurred vision, discharge, double vision, pain, visual disturbance and visual halos.   Cardiovascular: Negative for chest pain, claudication, dyspnea on exertion, irregular heartbeat, leg swelling, orthopnea and paroxysmal nocturnal dyspnea.   Respiratory: Negative for cough, hemoptysis, shortness of breath, sputum production and wheezing.    Endocrine: Positive for heat intolerance. Negative for cold intolerance and polyuria.   Hematologic/Lymphatic: Negative for adenopathy and bleeding problem. Does not bruise/bleed easily.   Skin: Positive for poor wound healing. Negative for dry skin, itching, rash and suspicious lesions.   Musculoskeletal: Positive for back pain, falls and joint pain. Negative for arthritis, muscle weakness and myalgias.   Gastrointestinal: Positive for constipation. Negative for abdominal pain, diarrhea, dysphagia, hematemesis, nausea and vomiting.   Genitourinary: Negative for bladder incontinence, dysuria, flank  pain and frequency.   Neurological: Positive for weakness. Negative for aphonia, difficulty with concentration, disturbances in coordination and dizziness.   Psychiatric/Behavioral: Negative for altered mental status, depression, memory loss, substance abuse and suicidal ideas. The patient does not have insomnia and is not nervous/anxious.    Allergic/Immunologic: Negative for environmental allergies, HIV exposure and hives.         Medications:     Allergies:   Allergies   Allergen Reactions   • Atorvastatin Other (See Comments)     LEG CRAMPS     • Amoxicillin Rash   • Escitalopram Rash   • Nabumetone Rash   • Niacin Er Rash   • Penicillins Rash   • Simvastatin Rash       Current Meds:   Current Facility-Administered Medications:   •  acetaminophen (TYLENOL) tablet 650 mg, 650 mg, Oral, Q4H PRN **OR** acetaminophen (TYLENOL) suppository 650 mg, 650 mg, Rectal, Q4H PRN, Beni Urrutia MD  •  ascorbic acid (VITAMIN C) tablet 500 mg, 500 mg, Oral, BID, Beni Urrutia MD  •  bisacodyl (DULCOLAX) suppository 10 mg, 10 mg, Rectal, Daily PRN, Beni Urrutia MD  •  budesonide (PULMICORT) nebulizer solution 0.5 mg, 0.5 mg, Nebulization, BID PRN, Beni Urrutia MD  •  carvedilol (COREG) tablet 25 mg, 25 mg, Oral, BID With Meals, Beni Urrutia MD  •  ceFAZolin in 0.9% normal saline (ANCEF) IVPB solution 2 g, 2 g, Intravenous, Q8H, Elie Copeland MD  •  cloNIDine (CATAPRES) tablet 0.1 mg, 0.1 mg, Oral, BID PRN, Beni Urrutia MD  •  cyclobenzaprine (FLEXERIL) tablet 10 mg, 10 mg, Oral, TID, Beni Urrutia MD  •  docusate sodium (COLACE) capsule 100 mg, 100 mg, Oral, BID, Beni Urrutia MD  •  folic acid (FOLVITE) tablet 1 mg, 1 mg, Oral, Daily, Beni Urrutia MD  •  gabapentin (NEURONTIN) capsule 100 mg, 100 mg, Oral, Q12H, Beni Urrutia MD  •  heparin (porcine) 5000 UNIT/ML injection 5,000 Units, 5,000 Units, Subcutaneous, Q12H, Renan  Beni Olson MD  •  HYDROmorphone (DILAUDID) injection 1 mg, 1 mg, Intravenous, PRN, Beni Urrutia MD  •  isosorbide mononitrate (IMDUR) 24 hr tablet 60 mg, 60 mg, Oral, BID - Nitrates, Beni Urrutia MD  •  labetalol (NORMODYNE,TRANDATE) injection 20 mg, 20 mg, Intravenous, Q10 Min PRN, Beni Urrutia MD  •  levothyroxine (SYNTHROID, LEVOTHROID) tablet 75 mcg, 75 mcg, Oral, Q AM, Beni Urrutia MD  •  lidocaine (LIDODERM) 5 % 1 patch, 1 patch, Transdermal, Daily PRN, Beni Urrutia MD  •  magnesium hydroxide (MILK OF MAGNESIA) suspension 2400 mg/10mL 10 mL, 10 mL, Oral, Daily PRN, Beni Urrutia MD  •  multivitamin with minerals 1 tablet, 1 tablet, Oral, Daily, Beni Urrutia MD  •  naloxone (NARCAN) injection 0.1 mg, 0.1 mg, Intravenous, Q5 Min PRN, Beni Urrutia MD  •  nitroglycerin (NITROSTAT) SL tablet 0.4 mg, 0.4 mg, Sublingual, Q5 Min PRN, Beni Urrutia MD  •  ondansetron (ZOFRAN) tablet 4 mg, 4 mg, Oral, Q6H PRN **OR** ondansetron (ZOFRAN) injection 4 mg, 4 mg, Intravenous, Q6H PRN, Beni Urrutia MD  •  oxyCODONE-acetaminophen (PERCOCET)  MG per tablet 2 tablet, 2 tablet, Oral, Q8H, Beni Urrutia MD  •  oxyCODONE-acetaminophen (PERCOCET) 7.5-325 MG per tablet 1 tablet, 1 tablet, Oral, Q4H PRN, Beni Urrutia MD  •  pantoprazole (PROTONIX) EC tablet 40 mg, 40 mg, Oral, QAM AC, Beni Urrutia MD  •  polyethylene glycol (MIRALAX) packet 17 g, 17 g, Oral, Daily, Beni Urrutia MD  •  predniSONE (DELTASONE) tablet 5 mg, 5 mg, Oral, Daily With Breakfast, Beni Urrutia MD  •  saccharomyces boulardii (FLORASTOR) capsule 250 mg, 250 mg, Oral, BID, Beni Urrutia MD  •  sennosides-docusate (PERICOLACE) 8.6-50 MG per tablet 2 tablet, 2 tablet, Oral, BID, Beni Urrutia MD  •  sodium chloride 0.9 % flush 10 mL, 10 mL, Intravenous, Q12H, Beni Urrutia MD  •  sodium  chloride 0.9 % flush 10 mL, 10 mL, Intravenous, PRN, Beni Urrutia MD  •  valACYclovir (VALTREX) tablet 500 mg, 500 mg, Oral, Q24H, Beni Urrutia MD  •  zinc sulfate (ZINCATE) capsule 220 mg, 220 mg, Oral, Daily, Beni Urrutia MD    Data:     Code Status:    There are no questions and answers to display.       Family History   Problem Relation Age of Onset   • Cancer Mother    • Heart disease Father        Social History     Socioeconomic History   • Marital status:      Spouse name: Not on file   • Number of children: Not on file   • Years of education: Not on file   • Highest education level: Not on file   Tobacco Use   • Smoking status: Never Smoker   • Smokeless tobacco: Never Used   Substance and Sexual Activity   • Alcohol use: No   • Drug use: Never   • Sexual activity: Defer       Vitals:  Wt 104 kg (229 lb 12.8 oz)   BMI 38.24 kg/m²   T 98.2 P 83 R 18 /78 Sp02 93% (room air)    I/O (24Hr):  No intake or output data in the 24 hours ending 03/16/21 1306    Labs and imaging:      No results found for this or any previous visit (from the past 12 hour(s)).        Physical Examination:        Physical Exam  Vitals and nursing note reviewed.   Constitutional:       Appearance: Normal appearance.   HENT:      Head: Normocephalic and atraumatic.      Right Ear: External ear normal.      Left Ear: External ear normal.      Nose: Nose normal.      Mouth/Throat:      Mouth: Mucous membranes are moist.      Pharynx: Oropharynx is clear.   Eyes:      Extraocular Movements: Extraocular movements intact.      Pupils: Pupils are equal, round, and reactive to light.   Neck:      Comments: Cervical collar in place  Incision c/d/i  Cardiovascular:      Rate and Rhythm: Normal rate and regular rhythm.      Pulses: Normal pulses.      Heart sounds: Normal heart sounds.   Pulmonary:      Effort: Pulmonary effort is normal.      Breath sounds: Normal breath sounds.   Abdominal:       General: Abdomen is flat. Bowel sounds are normal.      Palpations: Abdomen is soft.      Tenderness: There is abdominal tenderness.      Comments: Lower abdominal fullness, hypoactive bowel sounds   Musculoskeletal:      Cervical back: Normal range of motion. Pain with movement present.      Right lower leg: Edema present.      Left lower leg: Edema present.      Comments: Generalized weakness  Limited ROM RLE  Mild lower extremity edema   Skin:     General: Skin is warm and dry.      Capillary Refill: Capillary refill takes less than 2 seconds.      Comments: Wound vac intact right hip   Neurological:      General: No focal deficit present.      Mental Status: She is alert and oriented to person, place, and time.   Psychiatric:         Mood and Affect: Mood normal.         Behavior: Behavior normal.         Thought Content: Thought content normal.            Assessment:             * No active hospital problems. *    Past Medical History:   Diagnosis Date   • Arthritis    • Asthma    • Chronic sinusitis    • Coronary artery disease    • Eustachian tube dysfunction    • Heart disease    • Herpes simplex    • Hyperlipidemia    • Hypertension    • Knee dislocation    • Labral tear of right hip joint    • Laryngitis sicca    • Laryngitis, chronic    • MI (myocardial infarction) (CMS/[facility])    • Otorrhea    • Sensorineural hearing loss    • Sjogren's disease (CMS/[facility])         Plan:        1. MSSA bacteremia  2. Torn gluteus muscle s/p repair  3. Post-operative MSSA wound infection  4. Cervical spinal stenosis s/p corpectomy and decompression  5. RA  6. Chronic immunosuppression  7. Multifactorial anemia  8. Hypothyroidism  9. GERD  10. Hx CAD  11. Herpes simplex on chronic suppressive therapy  12. HTN  13. Right psoas abscess  14. L1-L2 discitis osteomyelitis  15. Constipation      Continue current treatment. Monitor counts. Increase activity as tolerated. Maintain patient safety.  Wound care per wound care team.  Aggressive therapy as tolerated with activity/weightbearing restrictions per ortho recommendations.  Antibiotics under direction of ID. Continue pain control efforts and avoid oversedation. Continue bowel regimen.  Labs Thursday. Continue gentle diuresis. Discharge planning.     Electronically signed by DANIELA Hill on 3/16/2021 at 13:06 CDT   I have discussed the care of Tawny Shin, including pertinent history and exam findings, with the nurse practitioner.    I have seen and examined the patient and the key elements of all parts of the encounter have been performed by me.  I agree with the assessment, plan and orders as documented by DANIELA Kyle, after I modified the exam findings and the plan of treatments and the final version is my approved version of the assessment.        Electronically signed by Beni Urrutia MD on 3/16/2021 at 19:33 CDT

## 2021-03-17 PROCEDURE — 97530 THERAPEUTIC ACTIVITIES: CPT

## 2021-03-17 PROCEDURE — 25010000002 CEFAZOLIN PER 500 MG: Performed by: INTERNAL MEDICINE

## 2021-03-17 PROCEDURE — 25010000002 HEPARIN (PORCINE) PER 1000 UNITS: Performed by: INTERNAL MEDICINE

## 2021-03-17 PROCEDURE — 97110 THERAPEUTIC EXERCISES: CPT

## 2021-03-17 PROCEDURE — 97535 SELF CARE MNGMENT TRAINING: CPT

## 2021-03-17 PROCEDURE — 63710000001 PREDNISONE PER 5 MG: Performed by: INTERNAL MEDICINE

## 2021-03-17 RX ORDER — FUROSEMIDE 20 MG/1
20 TABLET ORAL DAILY
Status: DISPENSED | OUTPATIENT
Start: 2021-03-17 | End: 2021-03-20

## 2021-03-17 NOTE — PROGRESS NOTES
ARGELIA Guerrero APRN          Internal Medicine Progress Note    3/17/2021   09:59 CDT    Name:  Tawny Shin  MRN:    [id number]     Acct:     [id number]   Room:  [address] [place] [address] Day: 0     Admit Date: 3/6/2021 11:36 AM  PCP: Davon Urrutia MD    Subjective:     C/C: Wound care and rehabilitation efforts.     Interval History: Status: stable. Resting in bed.   No family at bedside. C/o increased right hip pain today - impeding therapy. Reports that it feels like a muscle spasms above the wound that radiates across buttock and to right groin. Patient also with some external rotation of right hip. Imaging has been negative to date.  Afebrile. Discharge planning underway - patient hopeful for discharge to acute rehab.       Review of Systems   Constitutional: Positive for malaise/fatigue. Negative for chills, decreased appetite, fever, weight gain and weight loss.   HENT: Negative for congestion, ear discharge, hoarse voice, sore throat and tinnitus.         Cervical collar in place   Eyes: Negative for blurred vision, discharge, double vision, pain, visual disturbance and visual halos.   Cardiovascular: Negative for chest pain, claudication, dyspnea on exertion, irregular heartbeat, leg swelling, orthopnea and paroxysmal nocturnal dyspnea.   Respiratory: Negative for cough, hemoptysis, shortness of breath, sputum production and wheezing.    Endocrine: Positive for heat intolerance. Negative for cold intolerance and polyuria.   Hematologic/Lymphatic: Negative for adenopathy and bleeding problem. Does not bruise/bleed easily.   Skin: Positive for poor wound healing. Negative for dry skin, itching, rash and suspicious lesions.   Musculoskeletal: Positive for back pain, falls and joint pain. Negative for arthritis, muscle weakness and myalgias.   Gastrointestinal: Positive for constipation. Negative for abdominal pain, diarrhea, dysphagia, hematemesis, nausea and vomiting.    Genitourinary: Negative for bladder incontinence, dysuria, flank pain and frequency.   Neurological: Positive for weakness. Negative for aphonia, difficulty with concentration, disturbances in coordination and dizziness.   Psychiatric/Behavioral: Negative for altered mental status, depression, memory loss, substance abuse and suicidal ideas. The patient does not have insomnia and is not nervous/anxious.    Allergic/Immunologic: Negative for environmental allergies, HIV exposure and hives.         Medications:     Allergies:   Allergies   Allergen Reactions   • Atorvastatin Other (See Comments)     LEG CRAMPS     • Amoxicillin Rash   • Escitalopram Rash   • Nabumetone Rash   • Niacin Er Rash   • Penicillins Rash   • Simvastatin Rash       Current Meds:   Current Facility-Administered Medications:   •  acetaminophen (TYLENOL) tablet 650 mg, 650 mg, Oral, Q4H PRN **OR** acetaminophen (TYLENOL) suppository 650 mg, 650 mg, Rectal, Q4H PRN, Beni Urrutia MD  •  ascorbic acid (VITAMIN C) tablet 500 mg, 500 mg, Oral, BID, Beni Urrutia MD  •  bisacodyl (DULCOLAX) suppository 10 mg, 10 mg, Rectal, Daily PRN, Beni Urrutia MD  •  budesonide (PULMICORT) nebulizer solution 0.5 mg, 0.5 mg, Nebulization, BID PRN, Beni Urrutia MD  •  carvedilol (COREG) tablet 25 mg, 25 mg, Oral, BID With Meals, Beni Urrutia MD  •  ceFAZolin in 0.9% normal saline (ANCEF) IVPB solution 2 g, 2 g, Intravenous, Q8H, Elie Copeland MD  •  cloNIDine (CATAPRES) tablet 0.1 mg, 0.1 mg, Oral, BID PRN, Beni Urrutia MD  •  cyclobenzaprine (FLEXERIL) tablet 10 mg, 10 mg, Oral, TID, Beni Urrutia MD  •  docusate sodium (COLACE) capsule 100 mg, 100 mg, Oral, BID, Beni Urrutia MD  •  folic acid (FOLVITE) tablet 1 mg, 1 mg, Oral, Daily, Beni Urrutia MD  •  gabapentin (NEURONTIN) capsule 100 mg, 100 mg, Oral, Q12H, Beni Urrutia MD  •  heparin (porcine) 5000 UNIT/ML  injection 5,000 Units, 5,000 Units, Subcutaneous, Q12H, Beni Urrutia MD  •  HYDROmorphone (DILAUDID) injection 1 mg, 1 mg, Intravenous, PRN, Beni Urrutia MD  •  isosorbide mononitrate (IMDUR) 24 hr tablet 60 mg, 60 mg, Oral, BID - Nitrates, Beni Urrutia MD  •  labetalol (NORMODYNE,TRANDATE) injection 20 mg, 20 mg, Intravenous, Q10 Min PRN, Beni Urrutia MD  •  levothyroxine (SYNTHROID, LEVOTHROID) tablet 75 mcg, 75 mcg, Oral, Q AM, Beni Urrutia MD  •  lidocaine (LIDODERM) 5 % 1 patch, 1 patch, Transdermal, Daily PRN, Beni Urrutia MD  •  magnesium hydroxide (MILK OF MAGNESIA) suspension 2400 mg/10mL 10 mL, 10 mL, Oral, Daily PRN, Beni Urrutia MD  •  multivitamin with minerals 1 tablet, 1 tablet, Oral, Daily, Beni Urrutia MD  •  naloxone (NARCAN) injection 0.1 mg, 0.1 mg, Intravenous, Q5 Min PRN, Beni Urrutia MD  •  nitroglycerin (NITROSTAT) SL tablet 0.4 mg, 0.4 mg, Sublingual, Q5 Min PRN, Beni Urrutia MD  •  ondansetron (ZOFRAN) tablet 4 mg, 4 mg, Oral, Q6H PRN **OR** ondansetron (ZOFRAN) injection 4 mg, 4 mg, Intravenous, Q6H PRN, Beni Urrutia MD  •  oxyCODONE-acetaminophen (PERCOCET)  MG per tablet 2 tablet, 2 tablet, Oral, Q8H, Beni Urrutia MD  •  oxyCODONE-acetaminophen (PERCOCET) 7.5-325 MG per tablet 1 tablet, 1 tablet, Oral, Q4H PRN, Beni Urrutia MD  •  pantoprazole (PROTONIX) EC tablet 40 mg, 40 mg, Oral, QAM AC, Beni Urrutia MD  •  polyethylene glycol (MIRALAX) packet 17 g, 17 g, Oral, Daily, Beni Urrutia MD  •  predniSONE (DELTASONE) tablet 5 mg, 5 mg, Oral, Daily With Breakfast, Beni Urrutia MD  •  saccharomyces boulardii (FLORASTOR) capsule 250 mg, 250 mg, Oral, BID, Beni Urrutia MD  •  sennosides-docusate (PERICOLACE) 8.6-50 MG per tablet 2 tablet, 2 tablet, Oral, BID, Beni Urrutia MD  •  sodium chloride 0.9 % flush 10  mL, 10 mL, Intravenous, Q12H, Beni Urrutia MD  •  sodium chloride 0.9 % flush 10 mL, 10 mL, Intravenous, PRN, Beni Urrutia MD  •  valACYclovir (VALTREX) tablet 500 mg, 500 mg, Oral, Q24H, Beni Urrutia MD  •  zinc sulfate (ZINCATE) capsule 220 mg, 220 mg, Oral, Daily, Beni Urrutia MD    Data:     Code Status:    There are no questions and answers to display.       Family History   Problem Relation Age of Onset   • Cancer Mother    • Heart disease Father        Social History     Socioeconomic History   • Marital status:      Spouse name: Not on file   • Number of children: Not on file   • Years of education: Not on file   • Highest education level: Not on file   Tobacco Use   • Smoking status: Never Smoker   • Smokeless tobacco: Never Used   Substance and Sexual Activity   • Alcohol use: No   • Drug use: Never   • Sexual activity: Defer       Vitals:  Wt 104 kg (229 lb 12.8 oz)   BMI 38.24 kg/m²   T 98.1 P 88 R 16 /82 Sp02 94% (room air)    I/O (24Hr):  No intake or output data in the 24 hours ending 03/17/21 0959    Labs and imaging:      No results found for this or any previous visit (from the past 12 hour(s)).        Physical Examination:        Physical Exam  Vitals and nursing note reviewed.   Constitutional:       Appearance: Normal appearance.   HENT:      Head: Normocephalic and atraumatic.      Right Ear: External ear normal.      Left Ear: External ear normal.      Nose: Nose normal.      Mouth/Throat:      Mouth: Mucous membranes are moist.      Pharynx: Oropharynx is clear.   Eyes:      Extraocular Movements: Extraocular movements intact.      Pupils: Pupils are equal, round, and reactive to light.   Neck:      Comments: Cervical collar in place  Incision c/d/i  Cardiovascular:      Rate and Rhythm: Normal rate and regular rhythm.      Pulses: Normal pulses.      Heart sounds: Normal heart sounds.   Pulmonary:      Effort: Pulmonary effort is normal.       Breath sounds: Normal breath sounds.   Abdominal:      General: Abdomen is flat. Bowel sounds are normal.      Palpations: Abdomen is soft.      Tenderness: There is abdominal tenderness.      Comments: Lower abdominal fullness, hypoactive bowel sounds   Musculoskeletal:      Cervical back: Normal range of motion. Pain with movement present.      Right lower leg: Edema present.      Left lower leg: Edema present.      Comments: Generalized weakness  Limited ROM RLE  Mild lower extremity edema   Skin:     General: Skin is warm and dry.      Capillary Refill: Capillary refill takes less than 2 seconds.      Comments: Wound vac intact right hip   Neurological:      General: No focal deficit present.      Mental Status: She is alert and oriented to person, place, and time.   Psychiatric:         Mood and Affect: Mood normal.         Behavior: Behavior normal.         Thought Content: Thought content normal.            Assessment:             * No active hospital problems. *    Past Medical History:   Diagnosis Date   • Arthritis    • Asthma    • Chronic sinusitis    • Coronary artery disease    • Eustachian tube dysfunction    • Heart disease    • Herpes simplex    • Hyperlipidemia    • Hypertension    • Knee dislocation    • Labral tear of right hip joint    • Laryngitis sicca    • Laryngitis, chronic    • MI (myocardial infarction) (CMS/HCC)    • Otorrhea    • Sensorineural hearing loss    • Sjogren's disease (CMS/[facility])         Plan:        1. MSSA bacteremia  2. Torn gluteus muscle s/p repair  3. Post-operative MSSA wound infection  4. Cervical spinal stenosis s/p corpectomy and decompression  5. RA  6. Chronic immunosuppression  7. Multifactorial anemia  8. Hypothyroidism  9. GERD  10. Hx CAD  11. Herpes simplex on chronic suppressive therapy  12. HTN  13. Right psoas abscess  14. L1-L2 discitis osteomyelitis  15. Constipation      Continue current treatment. Monitor counts. Increase activity as tolerated.  Maintain patient safety.  Wound care per wound care team. Aggressive therapy as tolerated with activity/weightbearing restrictions per ortho recommendations.  Antibiotics under direction of ID. Continue pain control efforts and avoid oversedation. Continue bowel regimen.  Labs in am. Continue gentle diuresis. Discharge planning.     Electronically signed by DANIELA Hill on 3/17/2021 at 09:59 CDT   I have discussed the care of Tawny Shin, including pertinent history and exam findings, with the nurse practitioner.    I have seen and examined the patient and the key elements of all parts of the encounter have been performed by me.  I agree with the assessment, plan and orders as documented by DANIELA Kyle, after I modified the exam findings and the plan of treatments and the final version is my approved version of the assessment.        Electronically signed by Beni Urrutia MD on 3/17/2021 at 18:03 CDT

## 2021-03-18 ENCOUNTER — APPOINTMENT (OUTPATIENT)
Dept: MRI IMAGING | Facility: HOSPITAL | Age: 66
End: 2021-03-18

## 2021-03-18 LAB
ANION GAP SERPL CALCULATED.3IONS-SCNC: 7 MMOL/L (ref 5–15)
BASOPHILS # BLD AUTO: 0.04 10*3/MM3 (ref 0–0.2)
BASOPHILS NFR BLD AUTO: 0.6 % (ref 0–1.5)
BUN SERPL-MCNC: 8 MG/DL (ref 8–23)
BUN/CREAT SERPL: 19 (ref 7–25)
CALCIUM SPEC-SCNC: 8.7 MG/DL (ref 8.6–10.5)
CHLORIDE SERPL-SCNC: 100 MMOL/L (ref 98–107)
CO2 SERPL-SCNC: 32 MMOL/L (ref 22–29)
CREAT SERPL-MCNC: 0.42 MG/DL (ref 0.57–1)
CRP SERPL-MCNC: 8.8 MG/DL (ref 0–0.5)
DEPRECATED RDW RBC AUTO: 51.8 FL (ref 37–54)
EOSINOPHIL # BLD AUTO: 0.12 10*3/MM3 (ref 0–0.4)
EOSINOPHIL NFR BLD AUTO: 1.9 % (ref 0.3–6.2)
ERYTHROCYTE [DISTWIDTH] IN BLOOD BY AUTOMATED COUNT: 16.1 % (ref 12.3–15.4)
ERYTHROCYTE [SEDIMENTATION RATE] IN BLOOD: 55 MM/HR (ref 0–20)
GFR SERPL CREATININE-BSD FRML MDRD: 150 ML/MIN/1.73
GLUCOSE SERPL-MCNC: 86 MG/DL (ref 65–99)
HCT VFR BLD AUTO: 29.5 % (ref 34–46.6)
HGB BLD-MCNC: 9.3 G/DL (ref 12–15.9)
IMM GRANULOCYTES # BLD AUTO: 0.05 10*3/MM3 (ref 0–0.05)
IMM GRANULOCYTES NFR BLD AUTO: 0.8 % (ref 0–0.5)
LYMPHOCYTES # BLD AUTO: 1.45 10*3/MM3 (ref 0.7–3.1)
LYMPHOCYTES NFR BLD AUTO: 23.4 % (ref 19.6–45.3)
MCH RBC QN AUTO: 28.5 PG (ref 26.6–33)
MCHC RBC AUTO-ENTMCNC: 31.5 G/DL (ref 31.5–35.7)
MCV RBC AUTO: 90.5 FL (ref 79–97)
MONOCYTES # BLD AUTO: 0.77 10*3/MM3 (ref 0.1–0.9)
MONOCYTES NFR BLD AUTO: 12.4 % (ref 5–12)
NEUTROPHILS NFR BLD AUTO: 3.77 10*3/MM3 (ref 1.7–7)
NEUTROPHILS NFR BLD AUTO: 60.9 % (ref 42.7–76)
NRBC BLD AUTO-RTO: 0 /100 WBC (ref 0–0.2)
PLATELET # BLD AUTO: 237 10*3/MM3 (ref 140–450)
PMV BLD AUTO: 9.9 FL (ref 6–12)
POTASSIUM SERPL-SCNC: 4.3 MMOL/L (ref 3.5–5.2)
RBC # BLD AUTO: 3.26 10*6/MM3 (ref 3.77–5.28)
SODIUM SERPL-SCNC: 139 MMOL/L (ref 136–145)
WBC # BLD AUTO: 6.2 10*3/MM3 (ref 3.4–10.8)

## 2021-03-18 PROCEDURE — 80048 BASIC METABOLIC PNL TOTAL CA: CPT | Performed by: INTERNAL MEDICINE

## 2021-03-18 PROCEDURE — 97116 GAIT TRAINING THERAPY: CPT

## 2021-03-18 PROCEDURE — 86140 C-REACTIVE PROTEIN: CPT | Performed by: INTERNAL MEDICINE

## 2021-03-18 PROCEDURE — 63710000001 PREDNISONE PER 5 MG: Performed by: INTERNAL MEDICINE

## 2021-03-18 PROCEDURE — 85651 RBC SED RATE NONAUTOMATED: CPT | Performed by: INTERNAL MEDICINE

## 2021-03-18 PROCEDURE — 97535 SELF CARE MNGMENT TRAINING: CPT | Performed by: OCCUPATIONAL THERAPIST

## 2021-03-18 PROCEDURE — 25010000002 CEFAZOLIN PER 500 MG: Performed by: INTERNAL MEDICINE

## 2021-03-18 PROCEDURE — 99024 POSTOP FOLLOW-UP VISIT: CPT | Performed by: NURSE PRACTITIONER

## 2021-03-18 PROCEDURE — 72158 MRI LUMBAR SPINE W/O & W/DYE: CPT

## 2021-03-18 PROCEDURE — 25010000002 HEPARIN (PORCINE) PER 1000 UNITS: Performed by: INTERNAL MEDICINE

## 2021-03-18 PROCEDURE — 97110 THERAPEUTIC EXERCISES: CPT

## 2021-03-18 PROCEDURE — 85025 COMPLETE CBC W/AUTO DIFF WBC: CPT | Performed by: INTERNAL MEDICINE

## 2021-03-18 RX ORDER — OXYCODONE AND ACETAMINOPHEN 10; 325 MG/1; MG/1
2 TABLET ORAL EVERY 8 HOURS PRN
Status: DISCONTINUED | OUTPATIENT
Start: 2021-03-20 | End: 2021-03-22 | Stop reason: HOSPADM

## 2021-03-18 RX ORDER — FENTANYL 25 UG/H
1 PATCH TRANSDERMAL
Status: DISCONTINUED | OUTPATIENT
Start: 2021-03-18 | End: 2021-03-22 | Stop reason: HOSPADM

## 2021-03-18 NOTE — PROGRESS NOTES
Adult Nutrition  Assessment/PES    Patient Name:  Tawny Shin  YOB: 1955  MRN: [id number]  Admit Date:  3/6/2021    Assessment Date:  3/18/2021    Comments:  Weekly follow up with Mrs. Shin. She reports fair/decreased appetite, however PO intake avg has increased since last week.  Currently on Soft texture/ground diet with PO intake avg at 51.67%/9 meals over the past 3 days. She is drinking the Boost TID.  Sent diet message to kitchen for food preferences (no coffee and only chocolate flavor supplement). Will continue to follow.    Reason for Assessment              Reason for Assessment    Reason For Assessment  follow-up protocol         Nutrition/Diet History              Nutrition/Diet History    Typical Food/Fluid Intake  Pt reports her appetite just isn't great. Does have Boost Plus ordered TID and is drinking that. She has cookies and chips available at bedside brought in by family to snack on.     Supplemental Drinks/Foods/Additives  is drinking Boost TID, prefers only the chocolate flavor         Anthropometrics              Usual Body Weight (UBW)    Weight Loss Time Frame  current wt 229.8 lbs -- net loss 3.2 lbs since admission        Body Mass Index (BMI)    BMI Assessment  BMI 35-39.9: obesity grade II         Labs/Tests/Procedures/Meds              Labs/Procedures/Meds    Lab Results Reviewed  reviewed        Medications    Pertinent Medications Reviewed  reviewed     Pertinent Medications Comments  see MAR         Physical Findings              Physical Findings    Overall Physical Appearance  obese     Gastrointestinal  other (see comments) last BM 3/15/21     Skin  other (see comments);surgical incision;poor skin integrity/turgor;edema David Score 18; surgical wound with wound vac in place, cervical incision           Nutrition Prescription Ordered              Nutrition Prescription PO    Current PO Diet  Soft Texture     Texture  Ground     Fluid Consistency  Thin      Supplement  Boost Plus (Ensure Enlive, Ensure Plus)     Supplement Frequency  3 times a day         Evaluation of Received Nutrient/Fluid Intake              Nutrient/Fluid Evaluation    Number of Days Evaluated  3 days     Additional Documentation  Fluid Intake Evaluation (Group)        Fluid Intake Evaluation    Oral Fluid (mL)  1280     IV Fluid (mL)  167        PO Evaluation    Number of Days PO Intake Evaluated  3 days     Number of Meals  9     % PO Intake  51.67%               Problem/Interventions:  Problem 1              Nutrition Diagnoses Problem 1    Problem 1  Increased Nutrient Needs     Macronutrient  Protein     Etiology (related to)  Medical Diagnosis     Infectious Disease  MSSA     Skin  Surgical wound;Other (comment) Rt hip infection s/p reapair of torn gluteus; wound vac; s/p CERVICAL 6 CORPECTOMY WITH TITANIUM CAGE     Signs/Symptoms (evidenced by)  Other (comment);PO Intake to promote wound healing     Percent (%) intake recorded  51.67 %     Over number of meals  9               Intervention Goal              Intervention Goal    General  Meet nutritional needs for age/condition;Reduce/improve symptoms;Disease management/therapy     PO  Increase intake;Meet estimated needs;Continue positive trend     Weight  No significant weight loss         Nutrition Intervention              Nutrition Intervention    RD/Tech Action  Follow Tx progress;Care plan reviewd;Encourage intake;Interview for preference diet message sent to kitchen for food preference of no coffee and only chocolate flavor supplement           Education/Evaluation              Education    Education  No discharge needs identified at this time        Monitor/Evaluation    Monitor  Per protocol           Electronically signed by:  Samira Romero RDN, LD  03/18/21 14:49 CDT

## 2021-03-18 NOTE — PROGRESS NOTES
Infectious Diseases Progress Note    Patient:  Tawny Shin  YOB: 1955  MRN: [id number]   Admit date: 3/6/2021   Admitting Physician: Beni Urrutia MD  Primary Care Physician: Davon Urrutia MD    Chief Complaint/Interval History: She is having right hip discomfort.  She has right hip discomfort.  She describes the discomfort fairly superficial.  She indicates the back is doing okay.  She has a pretty hard time getting around even with short distances with regard to the left hip.  Per discussion with he had nurse, she has been offered a bed at [facility].  She had MRI yesterday.  No diarrhea or rash.    No intake or output data in the 24 hours ending 03/18/21 1041  Allergies:   Allergies   Allergen Reactions   • Atorvastatin Other (See Comments)     LEG CRAMPS     • Amoxicillin Rash   • Escitalopram Rash   • Nabumetone Rash   • Niacin Er Rash   • Penicillins Rash   • Simvastatin Rash     Current Scheduled Medications:   ascorbic acid, 500 mg, Oral, BID  carvedilol, 25 mg, Oral, BID With Meals  ceFAZolin, 2 g, Intravenous, Q8H  fentaNYL, 1 patch, Transdermal, Q72H   And  [START ON 3/19/2021] Check Fentanyl Patch Placement, 1 each, Does not apply, Q12H  cyclobenzaprine, 10 mg, Oral, TID  docusate sodium, 100 mg, Oral, BID  folic acid, 1 mg, Oral, Daily  furosemide, 20 mg, Oral, Daily  gabapentin, 100 mg, Oral, Q12H  heparin (porcine), 5,000 Units, Subcutaneous, Q12H  isosorbide mononitrate, 60 mg, Oral, BID - Nitrates  levothyroxine, 75 mcg, Oral, Q AM  multivitamin with minerals, 1 tablet, Oral, Daily  oxyCODONE-acetaminophen, 2 tablet, Oral, Q8H  pantoprazole, 40 mg, Oral, QAM AC  polyethylene glycol, 17 g, Oral, Daily  predniSONE, 5 mg, Oral, Daily With Breakfast  saccharomyces boulardii, 250 mg, Oral, BID  senna-docusate sodium, 2 tablet, Oral, BID  sodium chloride, 10 mL, Intravenous, Q12H  valACYclovir, 500 mg, Oral, Q24H  zinc sulfate, 220 mg, Oral, Daily      Current PRN  Medications:  •  acetaminophen **OR** acetaminophen  •  bisacodyl  •  budesonide  •  cloNIDine  •  HYDROmorphone  •  labetalol  •  lidocaine  •  magnesium hydroxide  •  Menthol (Topical Analgesic)  •  naloxone  •  nitroglycerin  •  ondansetron **OR** ondansetron  •  [START ON 3/20/2021] oxyCODONE-acetaminophen  •  oxyCODONE-acetaminophen  •  sodium chloride    Review of Systems no cardiopulmonary symptoms.  See HPI.    Vital Signs:  Wt 104 kg (229 lb 12.8 oz)   BMI 38.24 kg/m²     Physical Exam  Vital signs - reviewed.  Line/IV site - No erythema, warmth, induration, or tenderness.  Right hip without erythema or induration around wound VAC  Extremities 1+ bilateral lower extremity edema  She has some discomfort with attempts at flexion of the right hip.  She has flexion extension of both knees.  She has dorsi and plantar flexion both feet.  No clonus with forced dorsiflexion of either foot.  Intact sensation both lower extremities    Lab Results:  CBC:   Results from last 7 days   Lab Units 03/18/21  0534 03/15/21  0520   WBC 10*3/mm3 6.20 6.42   HEMOGLOBIN g/dL 9.3* 9.6*   HEMATOCRIT % 29.5* 30.7*   PLATELETS 10*3/mm3 237 239     BMP:  Results from last 7 days   Lab Units 03/18/21  0534 03/15/21  0520 03/13/21  0458   SODIUM mmol/L 139 139 142   POTASSIUM mmol/L 4.3 4.5 3.6   CHLORIDE mmol/L 100 98 102   CO2 mmol/L 32.0* 33.0* 31.0*   BUN mg/dL 8 10 8   CREATININE mg/dL 0.42* 0.44* 0.43*   GLUCOSE mg/dL 86 110* 95   CALCIUM mg/dL 8.7 9.3 8.4*     Culture Results: No new results    Radiology:     MRI lumbar spine:  IMPRESSION:  1. Continued discitis at L1-2 with rim enhancement of the disc. There is  osteomyelitis in the adjacent vertebral body endplates. There is a  moderate to large size central disc herniation prolapsing superiorly.  There is facet arthropathy and thickening of ligamentum flavum. There is  at least moderate narrowing of the central canal. There is foraminal  stenosis right greater than left.  There is probably some enhancing  granulation tissue around the disc herniation.  2. Right psoas fluid collection/abscess is slightly smaller in size.  3. Postoperative and degenerative changes are noted at other levels.  Please see the above report.  This report was finalized on 03/18/2021 11:59 by Dr. Zurdo Vazquez MD.    Additional Studies Reviewed: None    Impression:   1.  MSSA bacteremia  2.  Postoperative infection right hip following gluteus muscle repair  3.  Right psoas [id number] reviewed MRI scan today with radiology.  The psoas abscess is smaller than on previous exam, but remains present.  He did feel he could repeat the aspiration.  4.  Discitis/osteomyelitis L1/L2.  She has narrowing in the lumbar spine which is significant but stable in comparison to the prior MRI.  Not felt to have epidural abscess.  5..  Cervical stenosis-underwent surgical decompression  6.  Rheumatoid arthritis    Recommendations:   Continue treatment cefazolin  Feel she would benefit from ongoing physical therapy  Feel trial at acute rehab would be reasonable-hopefully she can participate for the required length of time  Spoke with orthopedic surgery-asked him to reassess right hip to make sure that no new recommendations from orthopedic standpoint  Contacted neurosurgery regarding her most recent MRI of the lumbar spine  Written orders for repeat CT-guided aspiration of the right psoas abscess (aerobic/anaerobic/fungal/AFB cultures ordered on aspirated fluid)    Elie Ohara MD

## 2021-03-18 NOTE — PROGRESS NOTES
ARGELIA Guerrero APRN          Internal Medicine Progress Note    3/18/2021   10:10 CDT    Name:  Tawny Shin  MRN:    [id number]     Acct:     [id number]   Room:  [address] [place] [address] Day: 0     Admit Date: 3/6/2021 11:36 AM  PCP: Davon Urrutia MD    Subjective:     C/C: Wound care and rehabilitation efforts.     Interval History: Status: stable. Up to chair.   No family at bedside. C/o increased right hip pain today - impeding therapy. Reports that it feels like a muscle spasms above the wound that radiates across buttock and to right groin. Patient also with some external rotation of right hip. Imaging has been negative to date.  Plans for MRI later today. Afebrile. Discharge planning underway - patient hopeful for discharge to acute rehab.       Review of Systems   Constitutional: Positive for malaise/fatigue. Negative for chills, decreased appetite, fever, weight gain and weight loss.   HENT: Negative for congestion, ear discharge, hoarse voice, sore throat and tinnitus.         Cervical collar in place   Eyes: Negative for blurred vision, discharge, double vision, pain, visual disturbance and visual halos.   Cardiovascular: Negative for chest pain, claudication, dyspnea on exertion, irregular heartbeat, leg swelling, orthopnea and paroxysmal nocturnal dyspnea.   Respiratory: Negative for cough, hemoptysis, shortness of breath, sputum production and wheezing.    Endocrine: Positive for heat intolerance. Negative for cold intolerance and polyuria.   Hematologic/Lymphatic: Negative for adenopathy and bleeding problem. Does not bruise/bleed easily.   Skin: Positive for poor wound healing. Negative for dry skin, itching, rash and suspicious lesions.   Musculoskeletal: Positive for back pain, falls and joint pain. Negative for arthritis, muscle weakness and myalgias.   Gastrointestinal: Positive for constipation. Negative for abdominal pain, diarrhea, dysphagia, hematemesis,  nausea and vomiting.   Genitourinary: Negative for bladder incontinence, dysuria, flank pain and frequency.   Neurological: Positive for weakness. Negative for aphonia, difficulty with concentration, disturbances in coordination and dizziness.   Psychiatric/Behavioral: Negative for altered mental status, depression, memory loss, substance abuse and suicidal ideas. The patient does not have insomnia and is not nervous/anxious.    Allergic/Immunologic: Negative for environmental allergies, HIV exposure and hives.         Medications:     Allergies:   Allergies   Allergen Reactions   • Atorvastatin Other (See Comments)     LEG CRAMPS     • Amoxicillin Rash   • Escitalopram Rash   • Nabumetone Rash   • Niacin Er Rash   • Penicillins Rash   • Simvastatin Rash       Current Meds:   Current Facility-Administered Medications:   •  acetaminophen (TYLENOL) tablet 650 mg, 650 mg, Oral, Q4H PRN **OR** acetaminophen (TYLENOL) suppository 650 mg, 650 mg, Rectal, Q4H PRN, Beni Urrutia MD  •  ascorbic acid (VITAMIN C) tablet 500 mg, 500 mg, Oral, BID, Beni Urrutia MD  •  bisacodyl (DULCOLAX) suppository 10 mg, 10 mg, Rectal, Daily PRN, Beni Urrutia MD  •  budesonide (PULMICORT) nebulizer solution 0.5 mg, 0.5 mg, Nebulization, BID PRN, Beni Urrutia MD  •  carvedilol (COREG) tablet 25 mg, 25 mg, Oral, BID With Meals, Beni Urrutia MD  •  ceFAZolin in 0.9% normal saline (ANCEF) IVPB solution 2 g, 2 g, Intravenous, Q8H, Elie Copeland MD  •  cloNIDine (CATAPRES) tablet 0.1 mg, 0.1 mg, Oral, BID PRN, Beni Urrutia MD  •  cyclobenzaprine (FLEXERIL) tablet 10 mg, 10 mg, Oral, TID, Beni Urrutia MD  •  docusate sodium (COLACE) capsule 100 mg, 100 mg, Oral, BID, Beni Urrutia MD  •  folic acid (FOLVITE) tablet 1 mg, 1 mg, Oral, Daily, Beni Urrutia MD  •  furosemide (LASIX) tablet 20 mg, 20 mg, Oral, Daily, Beni Urrutia MD  •  gabapentin  (NEURONTIN) capsule 100 mg, 100 mg, Oral, Q12H, Beni Urrutia MD  •  heparin (porcine) 5000 UNIT/ML injection 5,000 Units, 5,000 Units, Subcutaneous, Q12H, Beni Urrutia MD  •  HYDROmorphone (DILAUDID) injection 1 mg, 1 mg, Intravenous, PRN, Beni Urrutia MD  •  isosorbide mononitrate (IMDUR) 24 hr tablet 60 mg, 60 mg, Oral, BID - Nitrates, Beni Urrutia MD  •  labetalol (NORMODYNE,TRANDATE) injection 20 mg, 20 mg, Intravenous, Q10 Min PRN, Beni Urrutia MD  •  levothyroxine (SYNTHROID, LEVOTHROID) tablet 75 mcg, 75 mcg, Oral, Q AM, Beni Urrutia MD  •  lidocaine (LIDODERM) 5 % 1 patch, 1 patch, Transdermal, Daily PRN, Beni Urrutia MD  •  magnesium hydroxide (MILK OF MAGNESIA) suspension 2400 mg/10mL 10 mL, 10 mL, Oral, Daily PRN, Beni Urrutia MD  •  Menthol (Topical Analgesic) 4 % gel 1 application, 1 application, Apply externally, TID PRN, Beni Urrutia MD  •  multivitamin with minerals 1 tablet, 1 tablet, Oral, Daily, Beni Urrutia MD  •  naloxone (NARCAN) injection 0.1 mg, 0.1 mg, Intravenous, Q5 Min PRN, Beni Urrutia MD  •  nitroglycerin (NITROSTAT) SL tablet 0.4 mg, 0.4 mg, Sublingual, Q5 Min PRN, Beni Urrutia MD  •  ondansetron (ZOFRAN) tablet 4 mg, 4 mg, Oral, Q6H PRN **OR** ondansetron (ZOFRAN) injection 4 mg, 4 mg, Intravenous, Q6H PRN, Beni Urrutia MD  •  oxyCODONE-acetaminophen (PERCOCET)  MG per tablet 2 tablet, 2 tablet, Oral, Q8H, Beni Urrutia MD  •  oxyCODONE-acetaminophen (PERCOCET) 7.5-325 MG per tablet 1 tablet, 1 tablet, Oral, Q4H PRN, Beni Urrutia MD  •  pantoprazole (PROTONIX) EC tablet 40 mg, 40 mg, Oral, JOSE ALFREDOM AC, Beni Urrutia MD  •  polyethylene glycol (MIRALAX) packet 17 g, 17 g, Oral, Daily, Beni Urrutia MD  •  predniSONE (DELTASONE) tablet 5 mg, 5 mg, Oral, Daily With Breakfast, Beni Urrutia MD  •  saccharomyces  boulardii (FLORASTOR) capsule 250 mg, 250 mg, Oral, BID, Beni Urruita MD  •  sennosides-docusate (PERICOLACE) 8.6-50 MG per tablet 2 tablet, 2 tablet, Oral, BID, Beni Urrutia MD  •  sodium chloride 0.9 % flush 10 mL, 10 mL, Intravenous, Q12H, Beni Urrutia MD  •  sodium chloride 0.9 % flush 10 mL, 10 mL, Intravenous, PRN, Beni Urrutia MD  •  valACYclovir (VALTREX) tablet 500 mg, 500 mg, Oral, Q24H, Beni Urrutia MD  •  zinc sulfate (ZINCATE) capsule 220 mg, 220 mg, Oral, Daily, Beni Urrutia MD    Data:     Code Status:    There are no questions and answers to display.       Family History   Problem Relation Age of Onset   • Cancer Mother    • Heart disease Father        Social History     Socioeconomic History   • Marital status:      Spouse name: Not on file   • Number of children: Not on file   • Years of education: Not on file   • Highest education level: Not on file   Tobacco Use   • Smoking status: Never Smoker   • Smokeless tobacco: Never Used   Substance and Sexual Activity   • Alcohol use: No   • Drug use: Never   • Sexual activity: Defer       Vitals:  Wt 104 kg (229 lb 12.8 oz)   BMI 38.24 kg/m²   T 98.4 P 73 R 20 /92 Sp02 95% (room air)    I/O (24Hr):  No intake or output data in the 24 hours ending 03/18/21 1010    Labs and imaging:      Recent Results (from the past 12 hour(s))   Basic Metabolic Panel    Collection Time: 03/18/21  5:34 AM    Specimen: Blood   Result Value Ref Range    Glucose 86 65 - 99 mg/dL    BUN 8 8 - 23 mg/dL    Creatinine 0.42 (L) 0.57 - 1.00 mg/dL    Sodium 139 136 - 145 mmol/L    Potassium 4.3 3.5 - 5.2 mmol/L    Chloride 100 98 - 107 mmol/L    CO2 32.0 (H) 22.0 - 29.0 mmol/L    Calcium 8.7 8.6 - 10.5 mg/dL    eGFR Non African Amer 150 >60 mL/min/1.73    BUN/Creatinine Ratio 19.0 7.0 - 25.0    Anion Gap 7.0 5.0 - 15.0 mmol/L   Sedimentation Rate    Collection Time: 03/18/21  5:34 AM    Specimen: Blood    Result Value Ref Range    Sed Rate 55 (H) 0 - 20 mm/hr   C-reactive Protein    Collection Time: 03/18/21  5:34 AM    Specimen: Blood   Result Value Ref Range    C-Reactive Protein 8.80 (H) 0.00 - 0.50 mg/dL   CBC Auto Differential    Collection Time: 03/18/21  5:34 AM    Specimen: Blood   Result Value Ref Range    WBC 6.20 3.40 - 10.80 10*3/mm3    RBC 3.26 (L) 3.77 - 5.28 10*6/mm3    Hemoglobin 9.3 (L) 12.0 - 15.9 g/dL    Hematocrit 29.5 (L) 34.0 - 46.6 %    MCV 90.5 79.0 - 97.0 fL    MCH 28.5 26.6 - 33.0 pg    MCHC 31.5 31.5 - 35.7 g/dL    RDW 16.1 (H) 12.3 - 15.4 %    RDW-SD 51.8 37.0 - 54.0 fl    MPV 9.9 6.0 - 12.0 fL    Platelets 237 140 - 450 10*3/mm3    Neutrophil % 60.9 42.7 - 76.0 %    Lymphocyte % 23.4 19.6 - 45.3 %    Monocyte % 12.4 (H) 5.0 - 12.0 %    Eosinophil % 1.9 0.3 - 6.2 %    Basophil % 0.6 0.0 - 1.5 %    Immature Grans % 0.8 (H) 0.0 - 0.5 %    Neutrophils, Absolute 3.77 1.70 - 7.00 10*3/mm3    Lymphocytes, Absolute 1.45 0.70 - 3.10 10*3/mm3    Monocytes, Absolute 0.77 0.10 - 0.90 10*3/mm3    Eosinophils, Absolute 0.12 0.00 - 0.40 10*3/mm3    Basophils, Absolute 0.04 0.00 - 0.20 10*3/mm3    Immature Grans, Absolute 0.05 0.00 - 0.05 10*3/mm3    nRBC 0.0 0.0 - 0.2 /100 WBC           Physical Examination:        Physical Exam  Vitals and nursing note reviewed.   Constitutional:       Appearance: Normal appearance.   HENT:      Head: Normocephalic and atraumatic.      Right Ear: External ear normal.      Left Ear: External ear normal.      Nose: Nose normal.      Mouth/Throat:      Mouth: Mucous membranes are moist.      Pharynx: Oropharynx is clear.   Eyes:      Extraocular Movements: Extraocular movements intact.      Pupils: Pupils are equal, round, and reactive to light.   Neck:      Comments: Cervical collar in place  Incision c/d/i  Cardiovascular:      Rate and Rhythm: Normal rate and regular rhythm.      Pulses: Normal pulses.      Heart sounds: Normal heart sounds.   Pulmonary:      Effort:  Pulmonary effort is normal.      Breath sounds: Normal breath sounds.   Abdominal:      General: Abdomen is flat. Bowel sounds are normal.      Palpations: Abdomen is soft.      Tenderness: There is abdominal tenderness.      Comments: Lower abdominal fullness, hypoactive bowel sounds   Musculoskeletal:      Cervical back: Normal range of motion. Pain with movement present.      Right lower leg: Edema present.      Left lower leg: Edema present.      Comments: Generalized weakness  Limited ROM RLE  Mild lower extremity edema   Skin:     General: Skin is warm and dry.      Capillary Refill: Capillary refill takes less than 2 seconds.      Comments: Wound vac intact right hip   Neurological:      General: No focal deficit present.      Mental Status: She is alert and oriented to person, place, and time.   Psychiatric:         Mood and Affect: Mood normal.         Behavior: Behavior normal.         Thought Content: Thought content normal.            Assessment:             * No active hospital problems. *    Past Medical History:   Diagnosis Date   • Arthritis    • Asthma    • Chronic sinusitis    • Coronary artery disease    • Eustachian tube dysfunction    • Heart disease    • Herpes simplex    • Hyperlipidemia    • Hypertension    • Knee dislocation    • Labral tear of right hip joint    • Laryngitis sicca    • Laryngitis, chronic    • MI (myocardial infarction) (CMS/HCC)    • Otorrhea    • Sensorineural hearing loss    • Sjogren's disease (CMS/[facility])         Plan:        1. MSSA bacteremia  2. Torn gluteus muscle s/p repair  3. Post-operative MSSA wound infection  4. Cervical spinal stenosis s/p corpectomy and decompression  5. RA  6. Chronic immunosuppression  7. Multifactorial anemia  8. Hypothyroidism  9. GERD  10. Hx CAD  11. Herpes simplex on chronic suppressive therapy  12. HTN  13. Right psoas abscess  14. L1-L2 discitis osteomyelitis  15. Constipation      Continue current treatment. Monitor counts. Increase  activity as tolerated. Maintain patient safety.  Wound care per wound care team. Aggressive therapy as tolerated with activity/weightbearing restrictions per ortho recommendations.  Antibiotics under direction of ID. Continue pain control efforts and avoid oversedation. Continue bowel regimen.  Labs Monday. Continue gentle diuresis. Discharge planning.     Electronically signed by DANIELA Hill on 3/18/2021 at 10:10 CDT

## 2021-03-19 ENCOUNTER — APPOINTMENT (OUTPATIENT)
Dept: GENERAL RADIOLOGY | Facility: HOSPITAL | Age: 66
End: 2021-03-19

## 2021-03-19 LAB
ALBUMIN SERPL-MCNC: 2.5 G/DL (ref 3.5–5.2)
ALBUMIN/GLOB SERPL: 0.7 G/DL
ALP SERPL-CCNC: 103 U/L (ref 39–117)
ALT SERPL W P-5'-P-CCNC: <5 U/L (ref 1–33)
ANION GAP SERPL CALCULATED.3IONS-SCNC: 8 MMOL/L (ref 5–15)
APTT PPP: 48.5 SECONDS (ref 24.1–35)
AST SERPL-CCNC: 15 U/L (ref 1–32)
BASOPHILS # BLD AUTO: 0.05 10*3/MM3 (ref 0–0.2)
BASOPHILS NFR BLD AUTO: 0.9 % (ref 0–1.5)
BILIRUB SERPL-MCNC: 0.4 MG/DL (ref 0–1.2)
BUN SERPL-MCNC: 9 MG/DL (ref 8–23)
BUN/CREAT SERPL: 20.9 (ref 7–25)
CALCIUM SPEC-SCNC: 8.8 MG/DL (ref 8.6–10.5)
CHLORIDE SERPL-SCNC: 98 MMOL/L (ref 98–107)
CO2 SERPL-SCNC: 32 MMOL/L (ref 22–29)
CREAT SERPL-MCNC: 0.43 MG/DL (ref 0.57–1)
CRP SERPL-MCNC: 6.82 MG/DL (ref 0–0.5)
DEPRECATED RDW RBC AUTO: 52.5 FL (ref 37–54)
EOSINOPHIL # BLD AUTO: 0.15 10*3/MM3 (ref 0–0.4)
EOSINOPHIL NFR BLD AUTO: 2.6 % (ref 0.3–6.2)
ERYTHROCYTE [DISTWIDTH] IN BLOOD BY AUTOMATED COUNT: 16.4 % (ref 12.3–15.4)
GFR SERPL CREATININE-BSD FRML MDRD: 146 ML/MIN/1.73
GLOBULIN UR ELPH-MCNC: 3.5 GM/DL
GLUCOSE SERPL-MCNC: 79 MG/DL (ref 65–99)
HCT VFR BLD AUTO: 31.1 % (ref 34–46.6)
HGB BLD-MCNC: 9.7 G/DL (ref 12–15.9)
IMM GRANULOCYTES # BLD AUTO: 0.06 10*3/MM3 (ref 0–0.05)
IMM GRANULOCYTES NFR BLD AUTO: 1 % (ref 0–0.5)
INR PPP: 1.16 (ref 0.91–1.09)
LYMPHOCYTES # BLD AUTO: 1.6 10*3/MM3 (ref 0.7–3.1)
LYMPHOCYTES NFR BLD AUTO: 27.4 % (ref 19.6–45.3)
MCH RBC QN AUTO: 28.1 PG (ref 26.6–33)
MCHC RBC AUTO-ENTMCNC: 31.2 G/DL (ref 31.5–35.7)
MCV RBC AUTO: 90.1 FL (ref 79–97)
MONOCYTES # BLD AUTO: 0.84 10*3/MM3 (ref 0.1–0.9)
MONOCYTES NFR BLD AUTO: 14.4 % (ref 5–12)
NEUTROPHILS NFR BLD AUTO: 3.15 10*3/MM3 (ref 1.7–7)
NEUTROPHILS NFR BLD AUTO: 53.7 % (ref 42.7–76)
NRBC BLD AUTO-RTO: 0 /100 WBC (ref 0–0.2)
PLATELET # BLD AUTO: 260 10*3/MM3 (ref 140–450)
PMV BLD AUTO: 10.2 FL (ref 6–12)
POTASSIUM SERPL-SCNC: 3.8 MMOL/L (ref 3.5–5.2)
PROT SERPL-MCNC: 6 G/DL (ref 6–8.5)
PROTHROMBIN TIME: 14.4 SECONDS (ref 11.9–14.6)
RBC # BLD AUTO: 3.45 10*6/MM3 (ref 3.77–5.28)
SARS-COV-2 RDRP RESP QL NAA+PROBE: NORMAL
SODIUM SERPL-SCNC: 138 MMOL/L (ref 136–145)
WBC # BLD AUTO: 5.85 10*3/MM3 (ref 3.4–10.8)

## 2021-03-19 PROCEDURE — 97606 NEG PRS WND THER DME>50 SQCM: CPT

## 2021-03-19 PROCEDURE — 25010000002 CEFAZOLIN PER 500 MG: Performed by: INTERNAL MEDICINE

## 2021-03-19 PROCEDURE — 25010000002 HEPARIN (PORCINE) PER 1000 UNITS: Performed by: INTERNAL MEDICINE

## 2021-03-19 PROCEDURE — 74018 RADEX ABDOMEN 1 VIEW: CPT

## 2021-03-19 PROCEDURE — 97116 GAIT TRAINING THERAPY: CPT

## 2021-03-19 PROCEDURE — 85610 PROTHROMBIN TIME: CPT | Performed by: INTERNAL MEDICINE

## 2021-03-19 PROCEDURE — 86140 C-REACTIVE PROTEIN: CPT | Performed by: INTERNAL MEDICINE

## 2021-03-19 PROCEDURE — 85025 COMPLETE CBC W/AUTO DIFF WBC: CPT | Performed by: INTERNAL MEDICINE

## 2021-03-19 PROCEDURE — 63710000001 PREDNISONE PER 5 MG: Performed by: INTERNAL MEDICINE

## 2021-03-19 PROCEDURE — 63710000001 ONDANSETRON PER 8 MG: Performed by: INTERNAL MEDICINE

## 2021-03-19 PROCEDURE — 87635 SARS-COV-2 COVID-19 AMP PRB: CPT | Performed by: INTERNAL MEDICINE

## 2021-03-19 PROCEDURE — 85730 THROMBOPLASTIN TIME PARTIAL: CPT | Performed by: INTERNAL MEDICINE

## 2021-03-19 PROCEDURE — 80053 COMPREHEN METABOLIC PANEL: CPT | Performed by: INTERNAL MEDICINE

## 2021-03-19 NOTE — PROGRESS NOTES
Infectious Diseases Progress Note    Patient:  Tawny Shin  YOB: 1955  MRN: [id number]   Admit date: 3/6/2021   Admitting Physician: Beni Urrutia MD  Primary Care Physician: Davon Urrutia MD    Chief Complaint/Interval History: She continues to have some right hip discomfort.  She was able to ambulate with a walker to the door.  It remained difficult for her.  She was going to have CT-guided aspirate of right psoas abscess this morning.  It was going to be done under sedation.  She had eaten breakfast and the procedure was rescheduled to Monday.  [facility].  She is not having low back pain currently.  The degree of her hip pain makes it hard for her to work quite as much as therapy as she would like.  Hoping we can get some better control of her discomfort and maximize her therapy.    No intake or output data in the 24 hours ending 03/19/21 1319  Allergies:   Allergies   Allergen Reactions   • Atorvastatin Other (See Comments)     LEG CRAMPS     • Amoxicillin Rash   • Escitalopram Rash   • Nabumetone Rash   • Niacin Er Rash   • Penicillins Rash   • Simvastatin Rash     Current Scheduled Medications:   ascorbic acid, 500 mg, Oral, BID  carvedilol, 25 mg, Oral, BID With Meals  ceFAZolin, 2 g, Intravenous, Q8H  fentaNYL, 1 patch, Transdermal, Q72H   And  Check Fentanyl Patch Placement, 1 each, Does not apply, Q12H  cyclobenzaprine, 10 mg, Oral, TID  docusate sodium, 100 mg, Oral, BID  folic acid, 1 mg, Oral, Daily  furosemide, 20 mg, Oral, Daily  gabapentin, 100 mg, Oral, Q12H  heparin (porcine), 5,000 Units, Subcutaneous, Q12H  isosorbide mononitrate, 60 mg, Oral, BID - Nitrates  levothyroxine, 75 mcg, Oral, Q AM  methylnaltrexone, 6 mg, Subcutaneous, Every Other Day  multivitamin with minerals, 1 tablet, Oral, Daily  oxyCODONE-acetaminophen, 2 tablet, Oral, Q8H  pantoprazole, 40 mg, Oral, QAM AC  polyethylene glycol, 17 g, Oral, Daily  predniSONE, 5  mg, Oral, Daily With Breakfast  saccharomyces boulardii, 250 mg, Oral, BID  senna-docusate sodium, 2 tablet, Oral, BID  sodium chloride, 10 mL, Intravenous, Q12H  valACYclovir, 500 mg, Oral, Q24H  zinc sulfate, 220 mg, Oral, Daily      Current PRN Medications:  •  acetaminophen **OR** acetaminophen  •  bisacodyl  •  budesonide  •  cloNIDine  •  HYDROmorphone  •  labetalol  •  lidocaine  •  magnesium hydroxide  •  Menthol (Topical Analgesic)  •  naloxone  •  nitroglycerin  •  ondansetron **OR** ondansetron  •  [START ON 3/20/2021] oxyCODONE-acetaminophen  •  sodium chloride    Review of Systems see HPI    Vital Signs:  Wt 104 kg (229 lb 12.8 oz)   BMI 38.24 kg/m²     Physical Exam  Vital signs - reviewed.  Line/IV site - No erythema, warmth, induration, or tenderness.  Wound VAC in place right hip.  No surrounding erythema or induration.  She underwent wound VAC change this morning.  Abdomen is soft and nontender  Lungs without crackles    Lab Results:  CBC:   Results from last 7 days   Lab Units 03/19/21  0418 03/18/21  0534 03/15/21  0520   WBC 10*3/mm3 5.85 6.20 6.42   HEMOGLOBIN g/dL 9.7* 9.3* 9.6*   HEMATOCRIT % 31.1* 29.5* 30.7*   PLATELETS 10*3/mm3 260 237 239     BMP:  Results from last 7 days   Lab Units 03/19/21  0418 03/18/21  0534 03/15/21  0520 03/13/21  0458   SODIUM mmol/L 138 139 139 142   POTASSIUM mmol/L 3.8 4.3 4.5 3.6   CHLORIDE mmol/L 98 100 98 102   CO2 mmol/L 32.0* 32.0* 33.0* 31.0*   BUN mg/dL 9 8 10 8   CREATININE mg/dL 0.43* 0.42* 0.44* 0.43*   GLUCOSE mg/dL 79 86 110* 95   CALCIUM mg/dL 8.8 8.7 9.3 8.4*   ALT (SGPT) U/L <5  --   --   --      Culture Results: No new results    Radiology:   MRI lumbar sacral spine March 18, 2021:  IMPRESSION:  1. Continued discitis at L1-2 with rim enhancement of the disc. There is  osteomyelitis in the adjacent vertebral body endplates. There is a  moderate to large size central disc herniation prolapsing superiorly.  There is facet arthropathy and  thickening of ligamentum flavum. There is  at least moderate narrowing of the central canal. There is foraminal  stenosis right greater than left. There is probably some enhancing  granulation tissue around the disc herniation.  2. Right psoas fluid collection/abscess is slightly smaller in size.  3. Postoperative and degenerative changes are noted at other levels.  Please see the above report.  This report was finalized on 03/18/2021 11:59 by Dr. Zurdo Vazquez MD.    Additional Studies Reviewed: None    Impression:   1.  MSSA bacteremia  2.  Postoperative infection right hip following gluteus muscle repair  3.  Right psoas abscess-MSSA  4.  Discitis/osteomyelitis L1/L2  5.  Cervical stenosis-previous surgical decompression  6.  Rheumatoid arthritis    Recommendations:   Continue cefazolin 2 g IV every 8 hours  Orders written for CT-guided aspirate of right psoas abscess on Monday morning  Orders written for n.p.o. after midnight Monday night in anticipation of light sedation for CT Monday morning  If stable post procedure continued improvement-okay with me to acute rehab Monday afternoon  Continue physical therapy  Will see again Monday  Dr. Stanley available in the interim-please call if change in condition or new questions for infectious diseases    Elie Ohara MD

## 2021-03-19 NOTE — PROGRESS NOTES
Orthopaedic Surgery Progress Note      3/19/2021   08:22 CDT    Name:  Tawny Shin  MRN:    [id number]     Acct:     [id number]   Room:  5/[facility] Day: 0     Admit Date: 3/6/2021  PCP: Davon Urrutia MD        Subjective:     C/C: Right hip pain    HPI: S/p right abductor repair with subsequent post-operative surgical infection of right hip and wound vac placement.  She subsequently developed MSSA bacteremia, a right psoas abscess, and discitis/osteomyelitis of L1/L2.  Currently in cervical collar due to cervical stenosis with decompression surgery.  She has a hx of rheumatoid arthritis.  Patient tells me today that after surgery she was able to ambulate much better than she currently is able to.  This has progressively worsened.  Currently complaining of pain in her lateral hip at site of wound vac, inguinal area, and anterior thigh.  Denies any loss of sensation or new trauma to the area. Is scheduled to get repeat aspiration of right psoas abscess today.        Medications:     Allergies:   Allergies   Allergen Reactions   • Atorvastatin Other (See Comments)     LEG CRAMPS     • Amoxicillin Rash   • Escitalopram Rash   • Nabumetone Rash   • Niacin Er Rash   • Penicillins Rash   • Simvastatin Rash       Current Meds:   Current Facility-Administered Medications   Medication Dose Route Frequency Provider Last Rate Last Admin   • acetaminophen (TYLENOL) tablet 650 mg  650 mg Oral Q4H PRN Beni Urrutia MD        Or   • acetaminophen (TYLENOL) suppository 650 mg  650 mg Rectal Q4H PRN Beni Urrutia MD       • ascorbic acid (VITAMIN C) tablet 500 mg  500 mg Oral BID Beni Urrutia MD       • bisacodyl (DULCOLAX) suppository 10 mg  10 mg Rectal Daily PRN Beni Urrutia MD       • budesonide (PULMICORT) nebulizer solution 0.5 mg  0.5 mg Nebulization BID PRN Beni Urrutia MD       • carvedilol (COREG) tablet 25 mg  25 mg Oral BID With Meals Beni Urrutia MD       •  ceFAZolin in 0.9% normal saline (ANCEF) IVPB solution 2 g  2 g Intravenous Q8H Elie Copeland MD       • fentaNYL (DURAGESIC) 25 MCG/HR patch 1 patch  1 patch Transdermal Q72H Beni Urrutia MD        And   • Check Fentanyl Patch Placement  1 each Does not apply Q12H Beni Urrutia MD       • cloNIDine (CATAPRES) tablet 0.1 mg  0.1 mg Oral BID PRN Beni Urrutia MD       • cyclobenzaprine (FLEXERIL) tablet 10 mg  10 mg Oral TID Beni Urrutia MD       • docusate sodium (COLACE) capsule 100 mg  100 mg Oral BID Bnei Urrutia MD       • folic acid (FOLVITE) tablet 1 mg  1 mg Oral Daily Beni Urrutia MD       • furosemide (LASIX) tablet 20 mg  20 mg Oral Daily Beni Urrutia MD       • gabapentin (NEURONTIN) capsule 100 mg  100 mg Oral Q12H Beni Urrutia MD       • heparin (porcine) 5000 UNIT/ML injection 5,000 Units  5,000 Units Subcutaneous Q12H Beni Urrutia MD       • HYDROmorphone (DILAUDID) injection 1 mg  1 mg Intravenous PRN Beni Urrutia MD       • isosorbide mononitrate (IMDUR) 24 hr tablet 60 mg  60 mg Oral BID - Nitrates Beni Urrutia MD       • labetalol (NORMODYNE,TRANDATE) injection 20 mg  20 mg Intravenous Q10 Min PRN Beni Urrutia MD       • levothyroxine (SYNTHROID, LEVOTHROID) tablet 75 mcg  75 mcg Oral Q AM Beni Urrutia MD       • lidocaine (LIDODERM) 5 % 1 patch  1 patch Transdermal Daily PRN Beni Urrutia MD       • magnesium hydroxide (MILK OF MAGNESIA) suspension 2400 mg/10mL 10 mL  10 mL Oral Daily PRN Beni Urrutia MD       • Menthol (Topical Analgesic) 4 % gel 1 application  1 application Apply externally TID PRN Beni Urrutia MD       • multivitamin with minerals 1 tablet  1 tablet Oral Daily Beni Urrutia MD       • naloxone (NARCAN) injection 0.1 mg  0.1 mg Intravenous Q5 Min PRN Beni Urrutia MD       • nitroglycerin (NITROSTAT) SL  tablet 0.4 mg  0.4 mg Sublingual Q5 Min PRN Beni Urrutia MD       • ondansetron (ZOFRAN) tablet 4 mg  4 mg Oral Q6H PRN Beni Urrutia MD        Or   • ondansetron (ZOFRAN) injection 4 mg  4 mg Intravenous Q6H PRN Beni Urrutia MD       • oxyCODONE-acetaminophen (PERCOCET)  MG per tablet 2 tablet  2 tablet Oral Q8H Beni Urrutia MD       • [START ON 3/20/2021] oxyCODONE-acetaminophen (PERCOCET)  MG per tablet 2 tablet  2 tablet Oral Q8H PRN Beni Urrutia MD       • pantoprazole (PROTONIX) EC tablet 40 mg  40 mg Oral QAM AC Beni Urrutia MD       • polyethylene glycol (MIRALAX) packet 17 g  17 g Oral Daily Beni Urrutia MD       • predniSONE (DELTASONE) tablet 5 mg  5 mg Oral Daily With Breakfast Beni Urrutia MD       • saccharomyces boulardii (FLORASTOR) capsule 250 mg  250 mg Oral BID Beni Urrutia MD       • sennosides-docusate (PERICOLACE) 8.6-50 MG per tablet 2 tablet  2 tablet Oral BID Beni Urrutia MD       • sodium chloride 0.9 % flush 10 mL  10 mL Intravenous Q12H Beni Urrutia MD       • sodium chloride 0.9 % flush 10 mL  10 mL Intravenous PRN Beni Urrutia MD       • valACYclovir (VALTREX) tablet 500 mg  500 mg Oral Q24H Beni Urrutia MD       • zinc sulfate (ZINCATE) capsule 220 mg  220 mg Oral Daily Beni Urrutia MD               Data:     Code Status:    There are no questions and answers to display.       Family History   Problem Relation Age of Onset   • Cancer Mother    • Heart disease Father        Social History     Socioeconomic History   • Marital status:      Spouse name: Not on file   • Number of children: Not on file   • Years of education: Not on file   • Highest education level: Not on file   Tobacco Use   • Smoking status: Never Smoker   • Smokeless tobacco: Never Used   Substance and Sexual Activity   • Alcohol use: No   • Drug use: Never   •  Sexual activity: Defer       Vitals:  Wt 104 kg (229 lb 12.8 oz)   BMI 38.24 kg/m²   No data recorded.      I/O (24Hr):  No intake or output data in the 24 hours ending 03/19/21 0822    Labs:  Lab Results (last 72 hours)     Procedure Component Value Units Date/Time    Comprehensive Metabolic Panel [[id number]]  (Abnormal) Collected: 03/19/21 0418    Specimen: Blood Updated: 03/19/21 0547     Glucose 79 mg/dL      BUN 9 mg/dL      Creatinine 0.43 mg/dL      Sodium 138 mmol/L      Potassium 3.8 mmol/L      Chloride 98 mmol/L      CO2 32.0 mmol/L      Calcium 8.8 mg/dL      Total Protein 6.0 g/dL      Albumin 2.50 g/dL      ALT (SGPT) <5 U/L      AST (SGOT) 15 U/L      Alkaline Phosphatase 103 U/L      Total Bilirubin 0.4 mg/dL      eGFR Non African Amer 146 mL/min/1.73      Globulin 3.5 gm/dL      A/G Ratio 0.7 g/dL      BUN/Creatinine Ratio 20.9     Anion Gap 8.0 mmol/L     Narrative:      GFR Normal >60  Chronic Kidney Disease <60  Kidney Failure <15      C-reactive Protein [[id number]]  (Abnormal) Collected: 03/19/21 0418    Specimen: Blood Updated: 03/19/21 0547     C-Reactive Protein 6.82 mg/dL     aPTT [[id number]]  (Abnormal) Collected: 03/19/21 0418    Specimen: Blood Updated: 03/19/21 0534     PTT 48.5 seconds     Protime-INR [[id number]]  (Abnormal) Collected: 03/19/21 0418    Specimen: Blood Updated: 03/19/21 0534     Protime 14.4 Seconds      INR 1.16    CBC & Differential [[id number]]  (Abnormal) Collected: 03/19/21 0418    Specimen: Blood Updated: 03/19/21 0526    Narrative:      The following orders were created for panel order CBC & Differential.  Procedure                               Abnormality         Status                     ---------                               -----------         ------                     CBC Auto Differential[[id number]]        Abnormal            Final result                 Please view results for these tests on the individual orders.    CBC Auto Differential [[id number]]   (Abnormal) Collected: 03/19/21 0418    Specimen: Blood Updated: 03/19/21 0526     WBC 5.85 10*3/mm3      RBC 3.45 10*6/mm3      Hemoglobin 9.7 g/dL      Hematocrit 31.1 %      MCV 90.1 fL      MCH 28.1 pg      MCHC 31.2 g/dL      RDW 16.4 %      RDW-SD 52.5 fl      MPV 10.2 fL      Platelets 260 10*3/mm3      Neutrophil % 53.7 %      Lymphocyte % 27.4 %      Monocyte % 14.4 %      Eosinophil % 2.6 %      Basophil % 0.9 %      Immature Grans % 1.0 %      Neutrophils, Absolute 3.15 10*3/mm3      Lymphocytes, Absolute 1.60 10*3/mm3      Monocytes, Absolute 0.84 10*3/mm3      Eosinophils, Absolute 0.15 10*3/mm3      Basophils, Absolute 0.05 10*3/mm3      Immature Grans, Absolute 0.06 10*3/mm3      nRBC 0.0 /100 WBC     Basic Metabolic Panel [[id number]]  (Abnormal) Collected: 03/18/21 0534    Specimen: Blood Updated: 03/18/21 0641     Glucose 86 mg/dL      BUN 8 mg/dL      Creatinine 0.42 mg/dL      Sodium 139 mmol/L      Potassium 4.3 mmol/L      Chloride 100 mmol/L      CO2 32.0 mmol/L      Calcium 8.7 mg/dL      eGFR Non African Amer 150 mL/min/1.73      BUN/Creatinine Ratio 19.0     Anion Gap 7.0 mmol/L     Narrative:      GFR Normal >60  Chronic Kidney Disease <60  Kidney Failure <15      C-reactive Protein [[id number]]  (Abnormal) Collected: 03/18/21 0534    Specimen: Blood Updated: 03/18/21 0641     C-Reactive Protein 8.80 mg/dL     Sedimentation Rate [[id number]]  (Abnormal) Collected: 03/18/21 0534    Specimen: Blood Updated: 03/18/21 0625     Sed Rate 55 mm/hr     CBC & Differential [[id number]]  (Abnormal) Collected: 03/18/21 0534    Specimen: Blood Updated: 03/18/21 0601    Narrative:      The following orders were created for panel order CBC & Differential.  Procedure                               Abnormality         Status                     ---------                               -----------         ------                     CBC Auto Differential[[id number]]        Abnormal            Final result                  Please view results for these tests on the individual orders.    CBC Auto Differential [[id number]]  (Abnormal) Collected: 03/18/21 0534    Specimen: Blood Updated: 03/18/21 0601     WBC 6.20 10*3/mm3      RBC 3.26 10*6/mm3      Hemoglobin 9.3 g/dL      Hematocrit 29.5 %      MCV 90.5 fL      MCH 28.5 pg      MCHC 31.5 g/dL      RDW 16.1 %      RDW-SD 51.8 fl      MPV 9.9 fL      Platelets 237 10*3/mm3      Neutrophil % 60.9 %      Lymphocyte % 23.4 %      Monocyte % 12.4 %      Eosinophil % 1.9 %      Basophil % 0.6 %      Immature Grans % 0.8 %      Neutrophils, Absolute 3.77 10*3/mm3      Lymphocytes, Absolute 1.45 10*3/mm3      Monocytes, Absolute 0.77 10*3/mm3      Eosinophils, Absolute 0.12 10*3/mm3      Basophils, Absolute 0.04 10*3/mm3      Immature Grans, Absolute 0.05 10*3/mm3      nRBC 0.0 /100 WBC               Physical Exam:     General: NAD, cooperative, AAOx3, cervical collar in place.  Right Hip: Incision is clean, dry, intact. Dressing is Clean, Dry, Intact.  Good seal on wound vac. Neurovascular intact distally. Minimal tenderness to palpation anterior aspect of hip, soft throughout. No signs or symptoms of DVT or PE.  No gross deformity to LE. Difficulty with any WB through RLE.       Assessment:     Primary Problem  Right hip pain s/p gluteal repair with development of post op surgical infection and wound vac placement on 02/09/21  MSSA Bacteremia  Right psoas abscess - MSSA  Discitis/Osteomyelitis L1/L2    Plan:     1. Dicussed with Dr. Carlson - will see how patient responds to aspiration of right psoas abscess today as this is the likely culprit for ongoing hip pain  2. Continue wound vac care per instructions  3. Continue WBAT RLE  4. Xray of right hip    Electronically signed by Silverio Parekh PA-C on 3/19/2021 at 08:22 CDT

## 2021-03-19 NOTE — PROGRESS NOTES
NEUROSURGERY DAILY PROGRESS NOTE    ASSESSMENT:   Tawny Shin is a 65 y.o. with a significant comorbidity rheumatoid arthritis on disease modifying agents, prior lumbar fusion by Dr. Oropeza in 2018.  She presents with a new problem of postop surgical infection of her right hip with wound VAC dressing, progressive right lower extremity proximal weakness without numbness or radiculopathy. Physical exam findings of right iliopsoas weakness, and global hyperreflexia with Babinski and right Patience's and decreased  strength bilaterally.  Their imaging shows CT of the cervical spine with severe cervical stenosis at severe stenosis C5-6 and C6/7 and adjacent segment disease at L1/2 and L2/3 with concern for osteomyelitis discitis.    Past Medical History:   Diagnosis Date   • Arthritis    • Asthma    • Chronic sinusitis    • Coronary artery disease    • Eustachian tube dysfunction    • Heart disease    • Herpes simplex    • Hyperlipidemia    • Hypertension    • Knee dislocation    • Labral tear of right hip joint    • Laryngitis sicca    • Laryngitis, chronic    • MI (myocardial infarction) (CMS/HCC)    • Otorrhea    • Sensorineural hearing loss    • Sjogren's disease (CMS/HCC)      There are no active hospital problems to display for this patient.    PLAN:   Neuro: Stable.  Improved  strength.  Worsening right anterior thigh pain.      Cervical stenosis    POD #16 C6 corpectomy   GUILLERMO DC'd   Cervical collar in place.  Trachea midline.  No difficulty with swallowing   Postop x-rays stable   Continue neurochecks every 4 hours   Ready to discharge from a neurosurgical perspective   Neurosurgery will continue to follow     Right psoas abscess   I&D right psoas abscess performed per IR (3/2/2021)   Body fluid culture: 4+ WBCs, rare 1+ gram-positive cocci   Light growth 2+ Staph aureus   Antibiotics per ID   Plan for repeat I&D right psoas abscess per IR    Concern for discitis/osteomyelitis L1/2  L1/2 severe central  canal stenosis  Severe stenosis from adjacent segment disease   We will continue to monitor for now   May require L1 laminectomy with possible discectomy, foraminotomy, and debridement in the near future   Antibiotics per ID   LSO brace when out of bed   Repeat MRI of the lumbar spine shows slight worsening of STIR signal change within the L1-2 disc space, however her L1-2 stenosis appears slightly improved from previous exam.        CV: JADEN.  VS WNL  Pulm: No acute issues.  Maintaining O2 sat.  Continuous pulse oximetry   : JADEN.  Voiding spontaneously.  Bladder scans and I/O cath per policy  FEN: Cleared for oral diet.  Meds in apple sauce.   GI: Prophylaxis  ORTHO: Open wound right posterior hip.  MSSA infection, wound vac per PT   3/4/2021: Scant growth Staphylococcus aureus        Scant growth Enterococcus     Continue wound vac    ID: MSSA bacteria and Enterococcus.  Postop vancomycin Complete   WBC normal. CRP waxes and wanes, 6-14.53-8.80.  ESR 55.   Cafazolin per ID    Heme:  DVT prophylaxis; SCD's  Pain: Tolerable at present with oral meds  Dispo: PT/OT.      Max ambulation allowable by Ortho.   LSO brace when out of bed   Ready for discharge from neurosurgical perspective   Plan for DC to [facility] rehab 3/19/2021   Follow-up with neurosurgery in 2 weeks for postop wound assessment   Follow-up with Dr. Shea in 6 weeks for repeat assessment.    CHIEF COMPLAINT:   Right-sided lumbar back pain with a new complaint of right anterior thigh pain    Subjective  Symptom stable.     Objective:  General Appearance:  Comfortable, well-appearing and in no acute distress.    Vital signs: (most recent): Weight 104 kg (229 lb 12.8 oz), not currently breastfeeding.      Neurologic Exam     Mental Status   Oriented to person, place, and time.   Attention: normal. Concentration: normal.   Speech: speech is normal   Level of consciousness: alert    Bright and awake.  Oriented x3.  Follows commands without prompting  showing thumbs up into fingers bilaterally.     Cranial Nerves     CN II   Visual fields full to confrontation.     CN III, IV, VI   Pupils are equal, round, and reactive to light.  Extraocular motions are normal.     CN V   Facial sensation intact.     CN VII   Facial expression full, symmetric.     CN VIII   CN VIII normal.     CN IX, X   CN IX normal.     CN XI   CN XI normal.     Motor Exam     Strength   Right deltoid: 5/5  Left deltoid: 5/5  Right biceps: 5/5  Left biceps: 5/5  Right triceps: 5/5  Left triceps: 5/5  Right wrist extension: 5/5  Left wrist extension: 5/5  Right interossei: 4/5  Left interossei: 4/5  Right iliopsoas: 2/5  Left iliopsoas: 5/5  Right quadriceps: 2/5  Left quadriceps: 5/5  Right anterior tibial: 3/5  Left anterior tibial: 5/5  Right gastroc: 4/5  Left gastroc: 5/5       Sensory Exam   Sensory deficit distribution on right: L1    Gait, Coordination, and Reflexes     Tremor   Resting tremor: absent  Intention tremor: absent  Action tremor: absent    Reflexes   Right plantar: upgoing  Left plantar: normal  Right Mims: present  Left Mims: absent  Right ankle clonus: present  Left ankle clonus: present  Right pendular knee jerk: absent  Left pendular knee jerk: absent    Incision: C, D, I    Drains: * No LDAs found *    Imaging Results (Last 24 Hours)     ** No results found for the last 24 hours. **        Lab Results (last 24 hours)     Procedure Component Value Units Date/Time    Comprehensive Metabolic Panel [[id number]]  (Abnormal) Collected: 03/19/21 0418    Specimen: Blood Updated: 03/19/21 0507     Glucose 79 mg/dL      BUN 9 mg/dL      Creatinine 0.43 mg/dL      Sodium 138 mmol/L      Potassium 3.8 mmol/L      Chloride 98 mmol/L      CO2 32.0 mmol/L      Calcium 8.8 mg/dL      Total Protein 6.0 g/dL      Albumin 2.50 g/dL      ALT (SGPT) <5 U/L      AST (SGOT) 15 U/L      Alkaline Phosphatase 103 U/L      Total Bilirubin 0.4 mg/dL      eGFR Non African Amer 146 mL/min/1.73       Globulin 3.5 gm/dL      A/G Ratio 0.7 g/dL      BUN/Creatinine Ratio 20.9     Anion Gap 8.0 mmol/L     Narrative:      GFR Normal >60  Chronic Kidney Disease <60  Kidney Failure <15      C-reactive Protein [[id number]]  (Abnormal) Collected: 03/19/21 0418    Specimen: Blood Updated: 03/19/21 0547     C-Reactive Protein 6.82 mg/dL     aPTT [[id number]]  (Abnormal) Collected: 03/19/21 0418    Specimen: Blood Updated: 03/19/21 0534     PTT 48.5 seconds     Protime-INR [[id number]]  (Abnormal) Collected: 03/19/21 0418    Specimen: Blood Updated: 03/19/21 0534     Protime 14.4 Seconds      INR 1.16    CBC & Differential [[id number]]  (Abnormal) Collected: 03/19/21 0418    Specimen: Blood Updated: 03/19/21 0526    Narrative:      The following orders were created for panel order CBC & Differential.  Procedure                               Abnormality         Status                     ---------                               -----------         ------                     CBC Auto Differential[[id number]]        Abnormal            Final result                 Please view results for these tests on the individual orders.    CBC Auto Differential [[id number]]  (Abnormal) Collected: 03/19/21 0418    Specimen: Blood Updated: 03/19/21 0526     WBC 5.85 10*3/mm3      RBC 3.45 10*6/mm3      Hemoglobin 9.7 g/dL      Hematocrit 31.1 %      MCV 90.1 fL      MCH 28.1 pg      MCHC 31.2 g/dL      RDW 16.4 %      RDW-SD 52.5 fl      MPV 10.2 fL      Platelets 260 10*3/mm3      Neutrophil % 53.7 %      Lymphocyte % 27.4 %      Monocyte % 14.4 %      Eosinophil % 2.6 %      Basophil % 0.9 %      Immature Grans % 1.0 %      Neutrophils, Absolute 3.15 10*3/mm3      Lymphocytes, Absolute 1.60 10*3/mm3      Monocytes, Absolute 0.84 10*3/mm3      Eosinophils, Absolute 0.15 10*3/mm3      Basophils, Absolute 0.05 10*3/mm3      Immature Grans, Absolute 0.06 10*3/mm3      nRBC 0.0 /100 WBC         Taiwo Prado, APRN

## 2021-03-19 NOTE — PROGRESS NOTES
Renan Urrutia M.D.  DANIELA Kyle          Internal Medicine Progress Note    3/19/2021   10:32 CDT    Name:  Tawny Shin  MRN:    [id number]     Acct:     [id number]   Room:  [address] [place] [address] Day: 0     Admit Date: 3/6/2021 11:36 AM  PCP: Davon Urrutia MD    Subjective:     C/C: Wound care and rehabilitation efforts.     Interval History: Status: stable. Up to chair.  No family at bedside. C/o increased right hip pain today - impeding therapy. Reports that adjustment in pain medication regimen with addition of fentanyl patch has not helped. Afebrile. Plans for drainage of psoas abscess today. Still with constipation despite aggressive bowel regimen.       Review of Systems   Constitutional: Positive for malaise/fatigue. Negative for chills, decreased appetite, fever, weight gain and weight loss.   HENT: Negative for congestion, ear discharge, hoarse voice, sore throat and tinnitus.         Cervical collar in place   Eyes: Negative for blurred vision, discharge, double vision, pain, visual disturbance and visual halos.   Cardiovascular: Negative for chest pain, claudication, dyspnea on exertion, irregular heartbeat, leg swelling, orthopnea and paroxysmal nocturnal dyspnea.   Respiratory: Negative for cough, hemoptysis, shortness of breath, sputum production and wheezing.    Endocrine: Positive for heat intolerance. Negative for cold intolerance and polyuria.   Hematologic/Lymphatic: Negative for adenopathy and bleeding problem. Does not bruise/bleed easily.   Skin: Positive for poor wound healing. Negative for dry skin, itching, rash and suspicious lesions.   Musculoskeletal: Positive for back pain, falls and joint pain. Negative for arthritis, muscle weakness and myalgias.   Gastrointestinal: Positive for constipation. Negative for abdominal pain, diarrhea, dysphagia, hematemesis, nausea and vomiting.   Genitourinary: Negative for bladder incontinence, dysuria, flank pain and frequency.    Neurological: Positive for weakness. Negative for aphonia, difficulty with concentration, disturbances in coordination and dizziness.   Psychiatric/Behavioral: Negative for altered mental status, depression, memory loss, substance abuse and suicidal ideas. The patient does not have insomnia and is not nervous/anxious.    Allergic/Immunologic: Negative for environmental allergies, HIV exposure and hives.         Medications:     Allergies:   Allergies   Allergen Reactions   • Atorvastatin Other (See Comments)     LEG CRAMPS     • Amoxicillin Rash   • Escitalopram Rash   • Nabumetone Rash   • Niacin Er Rash   • Penicillins Rash   • Simvastatin Rash       Current Meds:   Current Facility-Administered Medications:   •  acetaminophen (TYLENOL) tablet 650 mg, 650 mg, Oral, Q4H PRN **OR** acetaminophen (TYLENOL) suppository 650 mg, 650 mg, Rectal, Q4H PRN, Beni Urrutia MD  •  ascorbic acid (VITAMIN C) tablet 500 mg, 500 mg, Oral, BID, Beni Urrutia MD  •  bisacodyl (DULCOLAX) suppository 10 mg, 10 mg, Rectal, Daily PRN, Beni Urrutia MD  •  budesonide (PULMICORT) nebulizer solution 0.5 mg, 0.5 mg, Nebulization, BID PRN, Beni Urrutia MD  •  carvedilol (COREG) tablet 25 mg, 25 mg, Oral, BID With Meals, Beni Urrutia MD  •  ceFAZolin in 0.9% normal saline (ANCEF) IVPB solution 2 g, 2 g, Intravenous, Q8H, Elie Copeland MD  •  fentaNYL (DURAGESIC) 25 MCG/HR patch 1 patch, 1 patch, Transdermal, Q72H **AND** Check Fentanyl Patch Placement, 1 each, Does not apply, Q12H, Beni Urrutia MD  •  cloNIDine (CATAPRES) tablet 0.1 mg, 0.1 mg, Oral, BID PRN, Beni Urrutia MD  •  cyclobenzaprine (FLEXERIL) tablet 10 mg, 10 mg, Oral, TID, Beni Urrutia MD  •  docusate sodium (COLACE) capsule 100 mg, 100 mg, Oral, BID, Beni Urrutia MD  •  folic acid (FOLVITE) tablet 1 mg, 1 mg, Oral, Daily, Beni Urrutia MD  •  furosemide (LASIX) tablet 20 mg, 20  mg, Oral, Daily, Beni Urrutia MD  •  gabapentin (NEURONTIN) capsule 100 mg, 100 mg, Oral, Q12H, Beni Urrutia MD  •  heparin (porcine) 5000 UNIT/ML injection 5,000 Units, 5,000 Units, Subcutaneous, Q12H, Beni Urrutia MD  •  HYDROmorphone (DILAUDID) injection 1 mg, 1 mg, Intravenous, PRN, Beni Urrutia MD  •  isosorbide mononitrate (IMDUR) 24 hr tablet 60 mg, 60 mg, Oral, BID - Nitrates, Beni Urrutia MD  •  labetalol (NORMODYNE,TRANDATE) injection 20 mg, 20 mg, Intravenous, Q10 Min PRN, Beni Urrutia MD  •  levothyroxine (SYNTHROID, LEVOTHROID) tablet 75 mcg, 75 mcg, Oral, Q AM, Beni Urrutia MD  •  lidocaine (LIDODERM) 5 % 1 patch, 1 patch, Transdermal, Daily PRN, Beni Urrutia MD  •  magnesium hydroxide (MILK OF MAGNESIA) suspension 2400 mg/10mL 10 mL, 10 mL, Oral, Daily PRN, Beni Urrutia MD  •  Menthol (Topical Analgesic) 4 % gel 1 application, 1 application, Apply externally, TID PRN, Beni Urrutia MD  •  multivitamin with minerals 1 tablet, 1 tablet, Oral, Daily, Beni Urrutia MD  •  naloxone (NARCAN) injection 0.1 mg, 0.1 mg, Intravenous, Q5 Min PRN, Beni Urrutia MD  •  nitroglycerin (NITROSTAT) SL tablet 0.4 mg, 0.4 mg, Sublingual, Q5 Min PRN, Beni Urrutia MD  •  ondansetron (ZOFRAN) tablet 4 mg, 4 mg, Oral, Q6H PRN **OR** ondansetron (ZOFRAN) injection 4 mg, 4 mg, Intravenous, Q6H PRN, Beni Urrutia MD  •  oxyCODONE-acetaminophen (PERCOCET)  MG per tablet 2 tablet, 2 tablet, Oral, Q8H, Beni Urrutia MD  •  [START ON 3/20/2021] oxyCODONE-acetaminophen (PERCOCET)  MG per tablet 2 tablet, 2 tablet, Oral, Q8H PRN, Beni Urrutia MD  •  pantoprazole (PROTONIX) EC tablet 40 mg, 40 mg, Oral, QAM AC, Beni Urrutia MD  •  polyethylene glycol (MIRALAX) packet 17 g, 17 g, Oral, Daily, Beni Urrutia MD  •  predniSONE (DELTASONE) tablet 5 mg, 5  mg, Oral, Daily With Breakfast, Beni Urrutia MD  •  saccharomyces boulardii (FLORASTOR) capsule 250 mg, 250 mg, Oral, BID, Beni Urrutia MD  •  sennosides-docusate (PERICOLACE) 8.6-50 MG per tablet 2 tablet, 2 tablet, Oral, BID, Beni Urrutia MD  •  sodium chloride 0.9 % flush 10 mL, 10 mL, Intravenous, Q12H, Beni Urrutia MD  •  sodium chloride 0.9 % flush 10 mL, 10 mL, Intravenous, PRN, Beni Urrutia MD  •  valACYclovir (VALTREX) tablet 500 mg, 500 mg, Oral, Q24H, Beni Urrutia MD  •  zinc sulfate (ZINCATE) capsule 220 mg, 220 mg, Oral, Daily, Beni Urrutia MD    Data:     Code Status:    There are no questions and answers to display.       Family History   Problem Relation Age of Onset   • Cancer Mother    • Heart disease Father        Social History     Socioeconomic History   • Marital status:      Spouse name: Not on file   • Number of children: Not on file   • Years of education: Not on file   • Highest education level: Not on file   Tobacco Use   • Smoking status: Never Smoker   • Smokeless tobacco: Never Used   Substance and Sexual Activity   • Alcohol use: No   • Drug use: Never   • Sexual activity: Defer       Vitals:  Wt 104 kg (229 lb 12.8 oz)   BMI 38.24 kg/m²   T 98.5 P 79 R 18 /73 Sp02 97% (room air)    I/O (24Hr):  No intake or output data in the 24 hours ending 03/19/21 1032    Labs and imaging:      Recent Results (from the past 12 hour(s))   Comprehensive Metabolic Panel    Collection Time: 03/19/21  4:18 AM    Specimen: Blood   Result Value Ref Range    Glucose 79 65 - 99 mg/dL    BUN 9 8 - 23 mg/dL    Creatinine 0.43 (L) 0.57 - 1.00 mg/dL    Sodium 138 136 - 145 mmol/L    Potassium 3.8 3.5 - 5.2 mmol/L    Chloride 98 98 - 107 mmol/L    CO2 32.0 (H) 22.0 - 29.0 mmol/L    Calcium 8.8 8.6 - 10.5 mg/dL    Total Protein 6.0 6.0 - 8.5 g/dL    Albumin 2.50 (L) 3.50 - 5.20 g/dL    ALT (SGPT) <5 1 - 33 U/L    AST (SGOT) 15 1 -  32 U/L    Alkaline Phosphatase 103 39 - 117 U/L    Total Bilirubin 0.4 0.0 - 1.2 mg/dL    eGFR Non African Amer 146 >60 mL/min/1.73    Globulin 3.5 gm/dL    A/G Ratio 0.7 g/dL    BUN/Creatinine Ratio 20.9 7.0 - 25.0    Anion Gap 8.0 5.0 - 15.0 mmol/L   Protime-INR    Collection Time: 03/19/21  4:18 AM    Specimen: Blood   Result Value Ref Range    Protime 14.4 11.9 - 14.6 Seconds    INR 1.16 (H) 0.91 - 1.09   aPTT    Collection Time: 03/19/21  4:18 AM    Specimen: Blood   Result Value Ref Range    PTT 48.5 (H) 24.1 - 35.0 seconds   C-reactive Protein    Collection Time: 03/19/21  4:18 AM    Specimen: Blood   Result Value Ref Range    C-Reactive Protein 6.82 (H) 0.00 - 0.50 mg/dL   CBC Auto Differential    Collection Time: 03/19/21  4:18 AM    Specimen: Blood   Result Value Ref Range    WBC 5.85 3.40 - 10.80 10*3/mm3    RBC 3.45 (L) 3.77 - 5.28 10*6/mm3    Hemoglobin 9.7 (L) 12.0 - 15.9 g/dL    Hematocrit 31.1 (L) 34.0 - 46.6 %    MCV 90.1 79.0 - 97.0 fL    MCH 28.1 26.6 - 33.0 pg    MCHC 31.2 (L) 31.5 - 35.7 g/dL    RDW 16.4 (H) 12.3 - 15.4 %    RDW-SD 52.5 37.0 - 54.0 fl    MPV 10.2 6.0 - 12.0 fL    Platelets 260 140 - 450 10*3/mm3    Neutrophil % 53.7 42.7 - 76.0 %    Lymphocyte % 27.4 19.6 - 45.3 %    Monocyte % 14.4 (H) 5.0 - 12.0 %    Eosinophil % 2.6 0.3 - 6.2 %    Basophil % 0.9 0.0 - 1.5 %    Immature Grans % 1.0 (H) 0.0 - 0.5 %    Neutrophils, Absolute 3.15 1.70 - 7.00 10*3/mm3    Lymphocytes, Absolute 1.60 0.70 - 3.10 10*3/mm3    Monocytes, Absolute 0.84 0.10 - 0.90 10*3/mm3    Eosinophils, Absolute 0.15 0.00 - 0.40 10*3/mm3    Basophils, Absolute 0.05 0.00 - 0.20 10*3/mm3    Immature Grans, Absolute 0.06 (H) 0.00 - 0.05 10*3/mm3    nRBC 0.0 0.0 - 0.2 /100 WBC   COVID-19, [facility]-HOUSE,NASAL Swab (NO TRANSPORT MEDIA) 2 HR TAT - Swab, Nasopharynx    Collection Time: 03/19/21  9:19 AM    Specimen: Nasopharynx; Swab   Result Value Ref Range    COVID19 Presumptive Negative Presumptive Negative - Ref. Range            Physical Examination:        Physical Exam  Vitals and nursing note reviewed.   Constitutional:       Appearance: Normal appearance.   HENT:      Head: Normocephalic and atraumatic.      Right Ear: External ear normal.      Left Ear: External ear normal.      Nose: Nose normal.      Mouth/Throat:      Mouth: Mucous membranes are moist.      Pharynx: Oropharynx is clear.   Eyes:      Extraocular Movements: Extraocular movements intact.      Pupils: Pupils are equal, round, and reactive to light.   Neck:      Comments: Cervical collar in place  Incision c/d/i  Cardiovascular:      Rate and Rhythm: Normal rate and regular rhythm.      Pulses: Normal pulses.      Heart sounds: Normal heart sounds.   Pulmonary:      Effort: Pulmonary effort is normal.      Breath sounds: Normal breath sounds.   Abdominal:      General: Abdomen is flat. Bowel sounds are normal.      Palpations: Abdomen is soft.      Tenderness: There is abdominal tenderness.      Comments: Lower abdominal fullness, hypoactive bowel sounds   Musculoskeletal:      Cervical back: Normal range of motion. Pain with movement present.      Right lower leg: Edema present.      Left lower leg: Edema present.      Comments: Generalized weakness  Limited ROM RLE  Mild lower extremity edema   Skin:     General: Skin is warm and dry.      Capillary Refill: Capillary refill takes less than 2 seconds.      Comments: Wound vac intact right hip   Neurological:      General: No focal deficit present.      Mental Status: She is alert and oriented to person, place, and time.   Psychiatric:         Mood and Affect: Mood normal.         Behavior: Behavior normal.         Thought Content: Thought content normal.            Assessment:             * No active hospital problems. *    Past Medical History:   Diagnosis Date   • Arthritis    • Asthma    • Chronic sinusitis    • Coronary artery disease    • Eustachian tube dysfunction    • Heart disease    • Herpes simplex     • Hyperlipidemia    • Hypertension    • Knee dislocation    • Labral tear of right hip joint    • Laryngitis sicca    • Laryngitis, chronic    • MI (myocardial infarction) (CMS/HCC)    • Otorrhea    • Sensorineural hearing loss    • Sjogren's disease (CMS/HCC)         Plan:        1. MSSA bacteremia  2. Torn gluteus muscle s/p repair  3. Post-operative MSSA wound infection  4. Cervical spinal stenosis s/p corpectomy and decompression  5. RA  6. Chronic immunosuppression  7. Multifactorial anemia  8. Hypothyroidism  9. GERD  10. Hx CAD  11. Herpes simplex on chronic suppressive therapy  12. HTN  13. Right psoas abscess  14. L1-L2 discitis osteomyelitis  15. Constipation      Continue current treatment. Monitor counts. Increase activity as tolerated. Maintain patient safety.  Wound care per wound care team. Aggressive therapy as tolerated with activity/weightbearing restrictions per ortho recommendations.  Antibiotics under direction of ID. Continue pain control efforts and avoid oversedation. Continue bowel regimen.  Labs Monday. Continue gentle diuresis. CT guided abscess drainage today. Add relistor as patient NPO for planned procedure later today. KUB.    Electronically signed by DANIELA Hill on 3/19/2021 at 10:32 CDT   I have discussed the care of Tawny Shin, including pertinent history and exam findings, with the nurse practitioner.    I have seen and examined the patient and the key elements of all parts of the encounter have been performed by me.  I agree with the assessment, plan and orders as documented by DANIELA Kyle, after I modified the exam findings and the plan of treatments and the final version is my approved version of the assessment.        Electronically signed by Beni Urrutia MD on 3/19/2021 at 18:49 CDT

## 2021-03-20 PROCEDURE — 25010000002 CEFAZOLIN PER 500 MG: Performed by: INTERNAL MEDICINE

## 2021-03-20 PROCEDURE — 97116 GAIT TRAINING THERAPY: CPT

## 2021-03-20 PROCEDURE — 25010000002 HEPARIN (PORCINE) PER 1000 UNITS: Performed by: INTERNAL MEDICINE

## 2021-03-20 PROCEDURE — 63710000001 PREDNISONE PER 5 MG: Performed by: INTERNAL MEDICINE

## 2021-03-20 PROCEDURE — 25010000002 METHYLNALTREXONE 12 MG/0.6ML SOLUTION: Performed by: INTERNAL MEDICINE

## 2021-03-21 PROCEDURE — 97110 THERAPEUTIC EXERCISES: CPT

## 2021-03-21 PROCEDURE — 25010000002 CEFAZOLIN PER 500 MG: Performed by: INTERNAL MEDICINE

## 2021-03-21 PROCEDURE — 97116 GAIT TRAINING THERAPY: CPT

## 2021-03-21 PROCEDURE — 63710000001 PREDNISONE PER 5 MG: Performed by: INTERNAL MEDICINE

## 2021-03-21 PROCEDURE — 25010000002 HEPARIN (PORCINE) PER 1000 UNITS: Performed by: INTERNAL MEDICINE

## 2021-03-21 NOTE — CONSULTS
Asked to assess midline due to leaking at site. Appears that the extension set had become unscrewed from the cathlon. Reconnected and dressing changed to straighten out catheter. Flushes well, pt has no complaints of pain with flushing. Advised pt to make nurse aware if there are any more issues.

## 2021-03-21 NOTE — PROGRESS NOTES
ARGELIA Gurerero APRN          Internal Medicine Progress Note    3/21/2021   16:11 CDT    Name:  Tawny Shin  MRN:    [id number]     Acct:     [id number]   Room:  [address] [place] [address] Day: 0     Admit Date: 3/6/2021 11:36 AM  PCP: Davon Urrutia MD    Subjective:     C/C: Wound care and rehabilitation efforts.     Interval History: Status: stable and without changes. Up to chair, eyes closed, respirations even and unlabored; arouses easily to voice.  No family at bedside. States that pain is about the same. Afebrile.Still with constipation despite aggressive bowel regimen. Lower extremity edema mildly improved.       Review of Systems   Constitutional: Positive for malaise/fatigue. Negative for chills, decreased appetite, fever, weight gain and weight loss.   HENT: Negative for congestion, ear discharge, hoarse voice, sore throat and tinnitus.         Cervical collar in place   Eyes: Negative for blurred vision, discharge, double vision, pain, visual disturbance and visual halos.   Cardiovascular: Negative for chest pain, claudication, dyspnea on exertion, irregular heartbeat, leg swelling, orthopnea and paroxysmal nocturnal dyspnea.   Respiratory: Negative for cough, hemoptysis, shortness of breath, sputum production and wheezing.    Endocrine: Negative for cold intolerance and polyuria.   Hematologic/Lymphatic: Negative for adenopathy and bleeding problem. Does not bruise/bleed easily.   Skin: Positive for poor wound healing. Negative for dry skin, itching, rash and suspicious lesions.   Musculoskeletal: Positive for back pain and joint pain. Negative for arthritis, muscle weakness and myalgias.   Gastrointestinal: Positive for constipation. Negative for abdominal pain, diarrhea, dysphagia, hematemesis, nausea and vomiting.   Genitourinary: Negative for bladder incontinence, dysuria, flank pain and frequency.   Neurological: Positive for weakness. Negative for aphonia,  difficulty with concentration, disturbances in coordination and dizziness.   Psychiatric/Behavioral: Negative for altered mental status, depression, memory loss, substance abuse and suicidal ideas. The patient does not have insomnia and is not nervous/anxious.    Allergic/Immunologic: Negative for environmental allergies, HIV exposure and hives.         Medications:     Allergies:   Allergies   Allergen Reactions   • Atorvastatin Other (See Comments)     LEG CRAMPS     • Amoxicillin Rash   • Escitalopram Rash   • Nabumetone Rash   • Niacin Er Rash   • Penicillins Rash   • Simvastatin Rash       Current Meds:   Current Facility-Administered Medications:   •  acetaminophen (TYLENOL) tablet 650 mg, 650 mg, Oral, Q4H PRN **OR** acetaminophen (TYLENOL) suppository 650 mg, 650 mg, Rectal, Q4H PRN, Beni Urrutia MD  •  ascorbic acid (VITAMIN C) tablet 500 mg, 500 mg, Oral, BID, Beni Urrutia MD  •  bisacodyl (DULCOLAX) suppository 10 mg, 10 mg, Rectal, Daily PRN, Beni Urrutia MD  •  budesonide (PULMICORT) nebulizer solution 0.5 mg, 0.5 mg, Nebulization, BID PRN, Beni Urrutia MD  •  carvedilol (COREG) tablet 25 mg, 25 mg, Oral, BID With Meals, Beni Urrutia MD  •  ceFAZolin in 0.9% normal saline (ANCEF) IVPB solution 2 g, 2 g, Intravenous, Q8H, Elie Cpoeland MD  •  fentaNYL (DURAGESIC) 25 MCG/HR patch 1 patch, 1 patch, Transdermal, Q72H **AND** Check Fentanyl Patch Placement, 1 each, Does not apply, Q12H, Beni Urrutia MD  •  cloNIDine (CATAPRES) tablet 0.1 mg, 0.1 mg, Oral, BID PRN, Beni Urrutia MD  •  cyclobenzaprine (FLEXERIL) tablet 10 mg, 10 mg, Oral, TID, Beni Urrutia MD  •  docusate sodium (COLACE) capsule 100 mg, 100 mg, Oral, BID, Beni Urrutia MD  •  folic acid (FOLVITE) tablet 1 mg, 1 mg, Oral, Daily, Beni Urrutia MD  •  gabapentin (NEURONTIN) capsule 100 mg, 100 mg, Oral, Q12H, Beni Urrutia MD  •  heparin  (porcine) 5000 UNIT/ML injection 5,000 Units, 5,000 Units, Subcutaneous, Q12H, Beni Urrutia MD  •  HYDROmorphone (DILAUDID) injection 1 mg, 1 mg, Intravenous, PRN, Beni Urrutia MD  •  isosorbide mononitrate (IMDUR) 24 hr tablet 60 mg, 60 mg, Oral, BID - Nitrates, Beni Urrutia MD  •  labetalol (NORMODYNE,TRANDATE) injection 20 mg, 20 mg, Intravenous, Q10 Min PRN, Beni Urrutia MD  •  levothyroxine (SYNTHROID, LEVOTHROID) tablet 75 mcg, 75 mcg, Oral, Q AM, Beni Urrutia MD  •  lidocaine (LIDODERM) 5 % 1 patch, 1 patch, Transdermal, Daily PRN, Beni Urrutia MD  •  magnesium hydroxide (MILK OF MAGNESIA) suspension 2400 mg/10mL 10 mL, 10 mL, Oral, Daily PRN, Beni Urrutia MD  •  Menthol (Topical Analgesic) 4 % gel 1 application, 1 application, Apply externally, TID PRN, Beni Urrutia MD  •  methylnaltrexone (RELISTOR) injection 6 mg, 6 mg, Subcutaneous, Every Other Day, Beni Urrutia MD  •  multivitamin with minerals 1 tablet, 1 tablet, Oral, Daily, Beni Urrutia MD  •  naloxone (NARCAN) injection 0.1 mg, 0.1 mg, Intravenous, Q5 Min PRN, Beni Urrutia MD  •  nitroglycerin (NITROSTAT) SL tablet 0.4 mg, 0.4 mg, Sublingual, Q5 Min PRN, Beni Urrutia MD  •  ondansetron (ZOFRAN) tablet 4 mg, 4 mg, Oral, Q6H PRN **OR** ondansetron (ZOFRAN) injection 4 mg, 4 mg, Intravenous, Q6H PRN, Beni Urrutia MD  •  oxyCODONE-acetaminophen (PERCOCET)  MG per tablet 2 tablet, 2 tablet, Oral, Q8H PRN, Beni Urrutia MD  •  pantoprazole (PROTONIX) EC tablet 40 mg, 40 mg, Oral, QAM AC, Beni Urrutia MD  •  polyethylene glycol (MIRALAX) packet 17 g, 17 g, Oral, Daily, Beni Urrutia MD  •  predniSONE (DELTASONE) tablet 5 mg, 5 mg, Oral, Daily With Breakfast, Beni Urrutia MD  •  saccharomyces boulardii (FLORASTOR) capsule 250 mg, 250 mg, Oral, BID, Beni Urrutia MD  •   sennosides-docusate (PERICOLACE) 8.6-50 MG per tablet 2 tablet, 2 tablet, Oral, BID, Beni Urrutia MD  •  sodium chloride 0.9 % flush 10 mL, 10 mL, Intravenous, Q12H, Beni Urrutia MD  •  sodium chloride 0.9 % flush 10 mL, 10 mL, Intravenous, PRN, Beni Urrutia MD  •  valACYclovir (VALTREX) tablet 500 mg, 500 mg, Oral, Q24H, Beni Urrutia MD  •  zinc sulfate (ZINCATE) capsule 220 mg, 220 mg, Oral, Daily, Beni Urrutia MD    Data:     Code Status:    There are no questions and answers to display.       Family History   Problem Relation Age of Onset   • Cancer Mother    • Heart disease Father        Social History     Socioeconomic History   • Marital status:      Spouse name: Not on file   • Number of children: Not on file   • Years of education: Not on file   • Highest education level: Not on file   Tobacco Use   • Smoking status: Never Smoker   • Smokeless tobacco: Never Used   Substance and Sexual Activity   • Alcohol use: No   • Drug use: Never   • Sexual activity: Defer       Vitals:  Wt 104 kg (229 lb 12.8 oz)   BMI 38.24 kg/m²   T 97.8 P 97 R 14 /61 Sp02 93% (room air)    I/O (24Hr):  No intake or output data in the 24 hours ending 03/21/21 1611    Labs and imaging:      No results found for this or any previous visit (from the past 12 hour(s)).        Physical Examination:        Physical Exam  Vitals and nursing note reviewed.   Constitutional:       Appearance: Normal appearance.   HENT:      Head: Normocephalic and atraumatic.      Right Ear: External ear normal.      Left Ear: External ear normal.      Nose: Nose normal.      Mouth/Throat:      Mouth: Mucous membranes are moist.      Pharynx: Oropharynx is clear.   Eyes:      Extraocular Movements: Extraocular movements intact.      Pupils: Pupils are equal, round, and reactive to light.   Neck:      Comments: Cervical collar in place  Incision c/d/i  Cardiovascular:      Rate and Rhythm: Normal  rate and regular rhythm.      Pulses: Normal pulses.      Heart sounds: Normal heart sounds.   Pulmonary:      Effort: Pulmonary effort is normal.      Breath sounds: Normal breath sounds.   Abdominal:      General: Abdomen is flat. Bowel sounds are normal.      Palpations: Abdomen is soft.      Tenderness: There is abdominal tenderness.      Comments: Lower abdominal fullness, hypoactive bowel sounds   Musculoskeletal:      Cervical back: Normal range of motion. Pain with movement present.      Right lower leg: Edema present.      Left lower leg: Edema present.      Comments: Generalized weakness  Limited ROM RLE  Mild lower extremity edema   Skin:     General: Skin is warm and dry.      Capillary Refill: Capillary refill takes less than 2 seconds.      Comments: Wound vac intact right hip   Neurological:      General: No focal deficit present.      Mental Status: She is alert and oriented to person, place, and time.   Psychiatric:         Mood and Affect: Mood normal.         Behavior: Behavior normal.         Thought Content: Thought content normal.            Assessment:             * No active hospital problems. *    Past Medical History:   Diagnosis Date   • Arthritis    • Asthma    • Chronic sinusitis    • Coronary artery disease    • Eustachian tube dysfunction    • Heart disease    • Herpes simplex    • Hyperlipidemia    • Hypertension    • Knee dislocation    • Labral tear of right hip joint    • Laryngitis sicca    • Laryngitis, chronic    • MI (myocardial infarction) (CMS/[facility])    • Otorrhea    • Sensorineural hearing loss    • Sjogren's disease (CMS/[facility])         Plan:        1. MSSA bacteremia  2. Torn gluteus muscle s/p repair  3. Post-operative MSSA wound infection  4. Cervical spinal stenosis s/p corpectomy and decompression  5. RA  6. Chronic immunosuppression  7. Multifactorial anemia  8. Hypothyroidism  9. GERD  10. Hx CAD  11. Herpes simplex on chronic suppressive therapy  12. HTN  13. Right psoas  abscess  14. L1-L2 discitis osteomyelitis  15. Constipation      Continue current treatment. Monitor counts. Increase activity as tolerated. Maintain patient safety.  Wound care per wound care team. Aggressive therapy as tolerated with activity/weightbearing restrictions per ortho recommendations.  Antibiotics under direction of ID. Continue pain control efforts and avoid oversedation. Continue bowel regimen.  Labs Monday. Continue gentle diuresis.   Electronically signed by Beni Urrutia MD on 3/21/2021 at 16:11 CDT   CORRIE SUAREZ

## 2021-03-22 ENCOUNTER — HOSPITAL ENCOUNTER (INPATIENT)
Facility: HOSPITAL | Age: 66
LOS: 4 days | Discharge: NURSING, REHAB OR LONG-TERM CARE | End: 2021-03-26
Attending: NEUROLOGICAL SURGERY | Admitting: NEUROLOGICAL SURGERY

## 2021-03-22 ENCOUNTER — APPOINTMENT (OUTPATIENT)
Dept: GENERAL RADIOLOGY | Facility: HOSPITAL | Age: 66
End: 2021-03-22

## 2021-03-22 ENCOUNTER — APPOINTMENT (OUTPATIENT)
Dept: CT IMAGING | Facility: HOSPITAL | Age: 66
End: 2021-03-22

## 2021-03-22 DIAGNOSIS — R78.81 STAPHYLOCOCCUS AUREUS BACTEREMIA: ICD-10-CM

## 2021-03-22 DIAGNOSIS — Z74.09 IMPAIRED MOBILITY AND ADLS: ICD-10-CM

## 2021-03-22 DIAGNOSIS — Z74.09 IMPAIRED MOBILITY: ICD-10-CM

## 2021-03-22 DIAGNOSIS — M48.062 SPINAL STENOSIS, LUMBAR REGION, WITH NEUROGENIC CLAUDICATION: ICD-10-CM

## 2021-03-22 DIAGNOSIS — K68.12 ILIOPSOAS ABSCESS (HCC): ICD-10-CM

## 2021-03-22 DIAGNOSIS — Z78.9 IMPAIRED MOBILITY AND ADLS: ICD-10-CM

## 2021-03-22 DIAGNOSIS — M48.02 SPINAL STENOSIS IN CERVICAL REGION: ICD-10-CM

## 2021-03-22 DIAGNOSIS — M46.26 OSTEOMYELITIS OF LUMBAR SPINE (HCC): ICD-10-CM

## 2021-03-22 DIAGNOSIS — M46.46 DISCITIS, UNSPECIFIED, LUMBAR REGION: Primary | ICD-10-CM

## 2021-03-22 DIAGNOSIS — B95.61 STAPHYLOCOCCUS AUREUS BACTEREMIA: ICD-10-CM

## 2021-03-22 DIAGNOSIS — M46.46 DISCITIS OF LUMBAR REGION: ICD-10-CM

## 2021-03-22 LAB
ALBUMIN SERPL-MCNC: 2.6 G/DL (ref 3.5–5.2)
ALBUMIN/GLOB SERPL: 0.7 G/DL
ALP SERPL-CCNC: 98 U/L (ref 39–117)
ALT SERPL W P-5'-P-CCNC: <5 U/L (ref 1–33)
ANION GAP SERPL CALCULATED.3IONS-SCNC: 10 MMOL/L (ref 5–15)
APTT PPP: 40.5 SECONDS (ref 24.1–35)
AST SERPL-CCNC: 16 U/L (ref 1–32)
BASOPHILS # BLD AUTO: 0.05 10*3/MM3 (ref 0–0.2)
BASOPHILS NFR BLD AUTO: 0.8 % (ref 0–1.5)
BILIRUB SERPL-MCNC: 0.5 MG/DL (ref 0–1.2)
BUN SERPL-MCNC: 7 MG/DL (ref 8–23)
BUN/CREAT SERPL: 18.4 (ref 7–25)
CALCIUM SPEC-SCNC: 8.8 MG/DL (ref 8.6–10.5)
CHLORIDE SERPL-SCNC: 98 MMOL/L (ref 98–107)
CO2 SERPL-SCNC: 30 MMOL/L (ref 22–29)
CREAT SERPL-MCNC: 0.38 MG/DL (ref 0.57–1)
CRP SERPL-MCNC: 8.99 MG/DL (ref 0–0.5)
DEPRECATED RDW RBC AUTO: 53.9 FL (ref 37–54)
EOSINOPHIL # BLD AUTO: 0.19 10*3/MM3 (ref 0–0.4)
EOSINOPHIL NFR BLD AUTO: 3.2 % (ref 0.3–6.2)
ERYTHROCYTE [DISTWIDTH] IN BLOOD BY AUTOMATED COUNT: 16.6 % (ref 12.3–15.4)
ERYTHROCYTE [SEDIMENTATION RATE] IN BLOOD: 35 MM/HR (ref 0–20)
GFR SERPL CREATININE-BSD FRML MDRD: >150 ML/MIN/1.73
GLOBULIN UR ELPH-MCNC: 3.9 GM/DL
GLUCOSE SERPL-MCNC: 113 MG/DL (ref 65–99)
HCT VFR BLD AUTO: 32.9 % (ref 34–46.6)
HGB BLD-MCNC: 10.3 G/DL (ref 12–15.9)
IMM GRANULOCYTES # BLD AUTO: 0.03 10*3/MM3 (ref 0–0.05)
IMM GRANULOCYTES NFR BLD AUTO: 0.5 % (ref 0–0.5)
INR PPP: 1.34 (ref 0.91–1.09)
LYMPHOCYTES # BLD AUTO: 1.05 10*3/MM3 (ref 0.7–3.1)
LYMPHOCYTES NFR BLD AUTO: 17.4 % (ref 19.6–45.3)
MCH RBC QN AUTO: 28.1 PG (ref 26.6–33)
MCHC RBC AUTO-ENTMCNC: 31.3 G/DL (ref 31.5–35.7)
MCV RBC AUTO: 89.6 FL (ref 79–97)
MONOCYTES # BLD AUTO: 0.71 10*3/MM3 (ref 0.1–0.9)
MONOCYTES NFR BLD AUTO: 11.8 % (ref 5–12)
NEUTROPHILS NFR BLD AUTO: 3.99 10*3/MM3 (ref 1.7–7)
NEUTROPHILS NFR BLD AUTO: 66.3 % (ref 42.7–76)
NRBC BLD AUTO-RTO: 0 /100 WBC (ref 0–0.2)
PLATELET # BLD AUTO: 230 10*3/MM3 (ref 140–450)
PMV BLD AUTO: 9.6 FL (ref 6–12)
POTASSIUM SERPL-SCNC: 4.1 MMOL/L (ref 3.5–5.2)
PROT SERPL-MCNC: 6.5 G/DL (ref 6–8.5)
PROTHROMBIN TIME: 16.2 SECONDS (ref 11.9–14.6)
RBC # BLD AUTO: 3.67 10*6/MM3 (ref 3.77–5.28)
SARS-COV-2 RDRP RESP QL NAA+PROBE: NORMAL
SODIUM SERPL-SCNC: 138 MMOL/L (ref 136–145)
WBC # BLD AUTO: 6.02 10*3/MM3 (ref 3.4–10.8)

## 2021-03-22 PROCEDURE — 93005 ELECTROCARDIOGRAM TRACING: CPT | Performed by: NURSE PRACTITIONER

## 2021-03-22 PROCEDURE — 71045 X-RAY EXAM CHEST 1 VIEW: CPT

## 2021-03-22 PROCEDURE — 87635 SARS-COV-2 COVID-19 AMP PRB: CPT | Performed by: INTERNAL MEDICINE

## 2021-03-22 PROCEDURE — 87147 CULTURE TYPE IMMUNOLOGIC: CPT | Performed by: INTERNAL MEDICINE

## 2021-03-22 PROCEDURE — 87075 CULTR BACTERIA EXCEPT BLOOD: CPT | Performed by: INTERNAL MEDICINE

## 2021-03-22 PROCEDURE — 86140 C-REACTIVE PROTEIN: CPT | Performed by: NURSE PRACTITIONER

## 2021-03-22 PROCEDURE — 25010000002 CEFAZOLIN PER 500 MG: Performed by: NEUROLOGICAL SURGERY

## 2021-03-22 PROCEDURE — 85025 COMPLETE CBC W/AUTO DIFF WBC: CPT | Performed by: NURSE PRACTITIONER

## 2021-03-22 PROCEDURE — 87205 SMEAR GRAM STAIN: CPT | Performed by: INTERNAL MEDICINE

## 2021-03-22 PROCEDURE — 93005 ELECTROCARDIOGRAM TRACING: CPT | Performed by: INTERNAL MEDICINE

## 2021-03-22 PROCEDURE — 87186 SC STD MICRODIL/AGAR DIL: CPT | Performed by: INTERNAL MEDICINE

## 2021-03-22 PROCEDURE — 87102 FUNGUS ISOLATION CULTURE: CPT | Performed by: INTERNAL MEDICINE

## 2021-03-22 PROCEDURE — 87070 CULTURE OTHR SPECIMN AEROBIC: CPT | Performed by: INTERNAL MEDICINE

## 2021-03-22 PROCEDURE — 25810000003 SODIUM CHLORIDE 0.9 % WITH KCL 20 MEQ 20-0.9 MEQ/L-% SOLUTION: Performed by: NURSE PRACTITIONER

## 2021-03-22 PROCEDURE — 94799 UNLISTED PULMONARY SVC/PX: CPT

## 2021-03-22 PROCEDURE — 87116 MYCOBACTERIA CULTURE: CPT | Performed by: INTERNAL MEDICINE

## 2021-03-22 PROCEDURE — 75989 ABSCESS DRAINAGE UNDER X-RAY: CPT

## 2021-03-22 PROCEDURE — 49406 IMAGE CATH FLUID PERI/RETRO: CPT

## 2021-03-22 PROCEDURE — 99223 1ST HOSP IP/OBS HIGH 75: CPT | Performed by: NURSE PRACTITIONER

## 2021-03-22 PROCEDURE — 85730 THROMBOPLASTIN TIME PARTIAL: CPT | Performed by: NURSE PRACTITIONER

## 2021-03-22 PROCEDURE — 85651 RBC SED RATE NONAUTOMATED: CPT | Performed by: NURSE PRACTITIONER

## 2021-03-22 PROCEDURE — 93010 ELECTROCARDIOGRAM REPORT: CPT | Performed by: INTERNAL MEDICINE

## 2021-03-22 PROCEDURE — 80053 COMPREHEN METABOLIC PANEL: CPT | Performed by: NURSE PRACTITIONER

## 2021-03-22 PROCEDURE — 99024 POSTOP FOLLOW-UP VISIT: CPT | Performed by: NURSE PRACTITIONER

## 2021-03-22 PROCEDURE — 85610 PROTHROMBIN TIME: CPT | Performed by: NURSE PRACTITIONER

## 2021-03-22 PROCEDURE — 87206 SMEAR FLUORESCENT/ACID STAI: CPT | Performed by: INTERNAL MEDICINE

## 2021-03-22 RX ORDER — FUROSEMIDE 40 MG/1
40 TABLET ORAL DAILY
Status: DISCONTINUED | OUTPATIENT
Start: 2021-03-23 | End: 2021-03-26 | Stop reason: HOSPADM

## 2021-03-22 RX ORDER — GUAIFENESIN 600 MG/1
600 TABLET, EXTENDED RELEASE ORAL EVERY 12 HOURS SCHEDULED
Status: DISCONTINUED | OUTPATIENT
Start: 2021-03-22 | End: 2021-03-26 | Stop reason: HOSPADM

## 2021-03-22 RX ORDER — DOCUSATE SODIUM 100 MG/1
100 CAPSULE, LIQUID FILLED ORAL 2 TIMES DAILY
Status: DISCONTINUED | OUTPATIENT
Start: 2021-03-22 | End: 2021-03-26 | Stop reason: HOSPADM

## 2021-03-22 RX ORDER — ISOSORBIDE MONONITRATE 60 MG/1
60 TABLET, EXTENDED RELEASE ORAL 2 TIMES DAILY
Status: DISCONTINUED | OUTPATIENT
Start: 2021-03-22 | End: 2021-03-26 | Stop reason: HOSPADM

## 2021-03-22 RX ORDER — SODIUM CHLORIDE 0.9 % (FLUSH) 0.9 %
10 SYRINGE (ML) INJECTION EVERY 12 HOURS SCHEDULED
Status: DISCONTINUED | OUTPATIENT
Start: 2021-03-22 | End: 2021-03-26 | Stop reason: HOSPADM

## 2021-03-22 RX ORDER — ACETAMINOPHEN 325 MG/1
650 TABLET ORAL EVERY 4 HOURS PRN
Status: DISCONTINUED | OUTPATIENT
Start: 2021-03-22 | End: 2021-03-26 | Stop reason: HOSPADM

## 2021-03-22 RX ORDER — ACETAMINOPHEN 160 MG/5ML
650 SOLUTION ORAL EVERY 4 HOURS PRN
Status: DISCONTINUED | OUTPATIENT
Start: 2021-03-22 | End: 2021-03-26 | Stop reason: HOSPADM

## 2021-03-22 RX ORDER — PANTOPRAZOLE SODIUM 40 MG/1
40 TABLET, DELAYED RELEASE ORAL
Status: DISCONTINUED | OUTPATIENT
Start: 2021-03-23 | End: 2021-03-26 | Stop reason: HOSPADM

## 2021-03-22 RX ORDER — BISACODYL 10 MG
10 SUPPOSITORY, RECTAL RECTAL DAILY PRN
Status: DISCONTINUED | OUTPATIENT
Start: 2021-03-22 | End: 2021-03-26 | Stop reason: HOSPADM

## 2021-03-22 RX ORDER — BUDESONIDE 0.5 MG/2ML
0.5 INHALANT ORAL 2 TIMES DAILY PRN
Status: DISCONTINUED | OUTPATIENT
Start: 2021-03-22 | End: 2021-03-26 | Stop reason: HOSPADM

## 2021-03-22 RX ORDER — ACETAMINOPHEN 650 MG/1
650 SUPPOSITORY RECTAL EVERY 4 HOURS PRN
Status: DISCONTINUED | OUTPATIENT
Start: 2021-03-22 | End: 2021-03-26 | Stop reason: HOSPADM

## 2021-03-22 RX ORDER — CARVEDILOL 6.25 MG/1
12.5 TABLET ORAL 2 TIMES DAILY WITH MEALS
Status: DISCONTINUED | OUTPATIENT
Start: 2021-03-22 | End: 2021-03-26 | Stop reason: HOSPADM

## 2021-03-22 RX ORDER — NITROGLYCERIN 0.4 MG/1
0.4 TABLET SUBLINGUAL
Status: DISCONTINUED | OUTPATIENT
Start: 2021-03-22 | End: 2021-03-26 | Stop reason: HOSPADM

## 2021-03-22 RX ORDER — SODIUM CHLORIDE 0.9 % (FLUSH) 0.9 %
10 SYRINGE (ML) INJECTION AS NEEDED
Status: DISCONTINUED | OUTPATIENT
Start: 2021-03-22 | End: 2021-03-26 | Stop reason: HOSPADM

## 2021-03-22 RX ORDER — LEVOTHYROXINE SODIUM 0.07 MG/1
75 TABLET ORAL
Status: DISCONTINUED | OUTPATIENT
Start: 2021-03-23 | End: 2021-03-26 | Stop reason: HOSPADM

## 2021-03-22 RX ORDER — PREDNISONE 1 MG/1
2 TABLET ORAL DAILY
Status: DISCONTINUED | OUTPATIENT
Start: 2021-03-23 | End: 2021-03-26 | Stop reason: HOSPADM

## 2021-03-22 RX ORDER — NALOXONE HCL 0.4 MG/ML
0.1 VIAL (ML) INJECTION
Status: DISCONTINUED | OUTPATIENT
Start: 2021-03-22 | End: 2021-03-26 | Stop reason: HOSPADM

## 2021-03-22 RX ORDER — POLYETHYLENE GLYCOL 3350 17 G/17G
17 POWDER, FOR SOLUTION ORAL DAILY
Status: DISCONTINUED | OUTPATIENT
Start: 2021-03-23 | End: 2021-03-26 | Stop reason: HOSPADM

## 2021-03-22 RX ORDER — SACCHAROMYCES BOULARDII 250 MG
250 CAPSULE ORAL 2 TIMES DAILY
Status: DISCONTINUED | OUTPATIENT
Start: 2021-03-22 | End: 2021-03-26 | Stop reason: HOSPADM

## 2021-03-22 RX ORDER — OXYCODONE AND ACETAMINOPHEN 10; 325 MG/1; MG/1
1 TABLET ORAL EVERY 4 HOURS PRN
Status: DISCONTINUED | OUTPATIENT
Start: 2021-03-22 | End: 2021-03-26 | Stop reason: HOSPADM

## 2021-03-22 RX ORDER — HEPARIN SODIUM 5000 [USP'U]/ML
5000 INJECTION, SOLUTION INTRAVENOUS; SUBCUTANEOUS EVERY 12 HOURS SCHEDULED
Status: DISCONTINUED | OUTPATIENT
Start: 2021-03-23 | End: 2021-03-26 | Stop reason: HOSPADM

## 2021-03-22 RX ORDER — POLYETHYLENE GLYCOL 3350 17 G/17G
17 POWDER, FOR SOLUTION ORAL DAILY
Status: DISCONTINUED | OUTPATIENT
Start: 2021-03-22 | End: 2021-03-22

## 2021-03-22 RX ORDER — BUPIVACAINE HCL/0.9 % NACL/PF 0.1 %
2 PLASTIC BAG, INJECTION (ML) EPIDURAL EVERY 8 HOURS
Status: DISCONTINUED | OUTPATIENT
Start: 2021-03-22 | End: 2021-03-22

## 2021-03-22 RX ORDER — FUROSEMIDE 40 MG/1
40 TABLET ORAL DAILY
Status: DISCONTINUED | OUTPATIENT
Start: 2021-03-22 | End: 2021-03-22

## 2021-03-22 RX ORDER — HEPARIN SODIUM 5000 [USP'U]/ML
5000 INJECTION, SOLUTION INTRAVENOUS; SUBCUTANEOUS EVERY 12 HOURS SCHEDULED
Status: DISCONTINUED | OUTPATIENT
Start: 2021-03-22 | End: 2021-03-22

## 2021-03-22 RX ORDER — LABETALOL HYDROCHLORIDE 5 MG/ML
20 INJECTION, SOLUTION INTRAVENOUS
Status: DISCONTINUED | OUTPATIENT
Start: 2021-03-22 | End: 2021-03-26 | Stop reason: HOSPADM

## 2021-03-22 RX ORDER — SODIUM CHLORIDE AND POTASSIUM CHLORIDE 150; 900 MG/100ML; MG/100ML
100 INJECTION, SOLUTION INTRAVENOUS CONTINUOUS
Status: DISCONTINUED | OUTPATIENT
Start: 2021-03-22 | End: 2021-03-26 | Stop reason: HOSPADM

## 2021-03-22 RX ORDER — OXYCODONE AND ACETAMINOPHEN 7.5; 325 MG/1; MG/1
1 TABLET ORAL EVERY 4 HOURS PRN
Status: DISCONTINUED | OUTPATIENT
Start: 2021-03-22 | End: 2021-03-26 | Stop reason: HOSPADM

## 2021-03-22 RX ORDER — CLONIDINE HYDROCHLORIDE 0.1 MG/1
0.1 TABLET ORAL 2 TIMES DAILY PRN
Status: DISCONTINUED | OUTPATIENT
Start: 2021-03-22 | End: 2021-03-26 | Stop reason: HOSPADM

## 2021-03-22 RX ORDER — AMOXICILLIN 250 MG
2 CAPSULE ORAL 2 TIMES DAILY
Status: DISCONTINUED | OUTPATIENT
Start: 2021-03-22 | End: 2021-03-26 | Stop reason: HOSPADM

## 2021-03-22 RX ORDER — ONDANSETRON 4 MG/1
4 TABLET, FILM COATED ORAL EVERY 6 HOURS PRN
Status: DISCONTINUED | OUTPATIENT
Start: 2021-03-22 | End: 2021-03-26 | Stop reason: HOSPADM

## 2021-03-22 RX ORDER — GABAPENTIN 100 MG/1
100 CAPSULE ORAL EVERY 12 HOURS SCHEDULED
Status: DISCONTINUED | OUTPATIENT
Start: 2021-03-22 | End: 2021-03-26 | Stop reason: HOSPADM

## 2021-03-22 RX ORDER — BUPIVACAINE HCL/0.9 % NACL/PF 0.1 %
2 PLASTIC BAG, INJECTION (ML) EPIDURAL EVERY 8 HOURS
Status: DISCONTINUED | OUTPATIENT
Start: 2021-03-22 | End: 2021-03-26 | Stop reason: HOSPADM

## 2021-03-22 RX ADMIN — GUAIFENESIN 600 MG: 600 TABLET, EXTENDED RELEASE ORAL at 20:35

## 2021-03-22 RX ADMIN — GABAPENTIN 100 MG: 100 CAPSULE ORAL at 20:35

## 2021-03-22 RX ADMIN — CEFAZOLIN SODIUM 2 G: 10 INJECTION, POWDER, FOR SOLUTION INTRAVENOUS at 23:59

## 2021-03-22 RX ADMIN — CEFAZOLIN SODIUM 2 G: 10 INJECTION, POWDER, FOR SOLUTION INTRAVENOUS at 16:05

## 2021-03-22 RX ADMIN — CARVEDILOL 12.5 MG: 6.25 TABLET, FILM COATED ORAL at 18:02

## 2021-03-22 RX ADMIN — Medication 250 MG: at 20:35

## 2021-03-22 RX ADMIN — OXYCODONE HYDROCHLORIDE AND ACETAMINOPHEN 1 TABLET: 7.5; 325 TABLET ORAL at 20:39

## 2021-03-22 RX ADMIN — ISOSORBIDE MONONITRATE 60 MG: 60 TABLET, EXTENDED RELEASE ORAL at 18:03

## 2021-03-22 RX ADMIN — DOCUSATE SODIUM 100 MG: 100 CAPSULE ORAL at 20:35

## 2021-03-22 RX ADMIN — DOCUSATE SODIUM 50 MG AND SENNOSIDES 8.6 MG 2 TABLET: 8.6; 5 TABLET, FILM COATED ORAL at 20:34

## 2021-03-22 RX ADMIN — POTASSIUM CHLORIDE AND SODIUM CHLORIDE 100 ML/HR: 900; 150 INJECTION, SOLUTION INTRAVENOUS at 16:06

## 2021-03-22 RX ADMIN — LABETALOL HYDROCHLORIDE 20 MG: 5 INJECTION, SOLUTION INTRAVENOUS at 23:59

## 2021-03-22 NOTE — H&P
NEUROSURGERY INITIAL HOSPITAL ENCOUNTER    Assessment/Plan:   Tawny Shin is a 65 y.o. female with a significant comorbidity of a recent C6 corpectomy (3/3/2021), rheumatoid arthritis on disease modifying agents, prior lumbar fusion by Dr. Oropeza in 2018.  She presents with a new problem of postop surgical infection of her right hip with wound VAC dressing, progressive right lower extremity proximal weakness without numbness or radiculopathy. Physical exam findings of right iliopsoas weakness, and global hyperreflexia with Babinski and right Patience's and decreased  strength bilaterally.  No imaging shows increased STIR signal change within the L1-2 vertebral body concerning for osteomyelitis/discitis, central disc herniation at L1-2 resulting in central canal and bilateral foraminal stenosis, right psoas abscess which was I&D needed per IR today, 3/22/2021, with approximately 10 mL of purulent fluid collected and sent for microbiology evaluation.      Differential Diagnosis:   L1-2 osteomyelitis/discitis  Lumbar disc herniation at L1-2  Right IP weakness  Postop surgical infection to the right hip requiring wound VAC dressing    Recommendations:  Admit to 3A  Neurochecks per policy  Routine studies: CBC, CMP, CRP, ESR, chest x-ray, EKG  PT/OT in LSO brace  Continue right hip wound VAC care  N.p.o. after midnight  Additional recommendations to follow per Dr. Shea    I discussed the patients findings and my recommendations with patient and family    Thank you very much for this interesting consult.     Level of Risk: High due to:  illness with threat to life or bodily function  MDM: High  Mod = 96847, Hbsh=[id number]  ___________________________________________________________________    Reason for consult: Progressively worsening right lower extremity pain and weakness    Chief Complaint: Lumbar back pain, right leg pain in the L1-2 distribution, right lower extremity weakness    HPI: Tawny Shin is a 65  y.o. female with a significant comorbidity of a recent C6 corpectomy (3/3/2021), rheumatoid arthritis on disease modifying agents, prior lumbar fusion by Dr. Oropeza in 2018.  She presents with a new problem of postop surgical infection of her right hip with wound VAC dressing, progressive right lower extremity proximal weakness without numbness or radiculopathy.     On 3/4/2021 the patient was brought to the main operating room where she underwent an uneventful C6 corpectomy per Dr. Shea.  Postoperatively she did extremely well and her neurological exam improved.    On 3/6/2021 she returned to [facility] where she has continued IV antibiotics per ID.      She currently complains of lumbar back and progressively worsening right anterior thigh pain with right lower extremity weakness limiting her ambulatory capability.  She underwent her second incision and drainage of right psoas abscess per IR today, 3/22/2021, with approximately 10 mL of purulent fluid collected and sent for microbiology evaluation.  She denies fevers, chills, night sweats, unexplained weight loss, saddle anesthesia, or bowel bladder dysfunction.  She currently rates the severity of her symptoms 7/10.  No additional concerns at this time.    Review of Systems   HENT: Negative.    Eyes: Negative.    Respiratory: Negative.    Cardiovascular: Negative.    Gastrointestinal: Negative.    Endocrine: Negative.    Genitourinary: Negative.    Musculoskeletal: Positive for back pain.   Skin: Negative.    Allergic/Immunologic: Negative.    Neurological: Positive for weakness.   Hematological: Negative.    Psychiatric/Behavioral: Negative.    All other systems reviewed and are negative.     Past Medical History:  has a past medical history of Arthritis, Asthma, Chronic sinusitis, Coronary artery disease, Eustachian tube dysfunction, Heart disease, Herpes simplex, Hyperlipidemia, Hypertension, Knee dislocation, Labral tear of right hip joint, Laryngitis  sicca, Laryngitis, chronic, MI (myocardial infarction) (CMS/HCC), Otorrhea, Sensorineural hearing loss, and Sjogren's disease (CMS/HCC).    Past Surgical History:  has a past surgical history that includes Myringotomy w/ tubes (09/04/2014); Atrial cardiac pacemaker insertion; A-V cardiac pacemaker insertion (2016); lumbar laminectomy with fusion (Left, 1/17/2018); Lumbar fusion (N/A, 1/19/2018); Replacement total knee (Right); Other surgical history; Cardiac catheterization; Coronary angioplasty with stent; Colonoscopy (11/08/2011); Colonoscopy (11/12/2004); Esophagogastroduodenoscopy (07/10/2014); Cataract extraction; Joint replacement; hip abduction tenotomy bilateral (Right, 1/14/2021); Incision and drainage hip (Right, 2/9/2021); and Cervical Curettage (N/A, 3/3/2021).    Family History: family history includes Cancer in her mother; Heart disease in her father.    Social History:  reports that she has never smoked. She has never used smokeless tobacco. She reports that she does not drink alcohol and does not use drugs.    Allergies: Atorvastatin, Amoxicillin, Escitalopram, Nabumetone, Niacin er, Penicillins, and Simvastatin    Home Medications:   Current Facility-Administered Medications:   •  acetaminophen (TYLENOL) tablet 650 mg, 650 mg, Oral, Q4H PRN **OR** acetaminophen (TYLENOL) 160 MG/5ML solution 650 mg, 650 mg, Oral, Q4H PRN **OR** acetaminophen (TYLENOL) suppository 650 mg, 650 mg, Rectal, Q4H PRN, Taiwo Prado, APRN  •  bisacodyl (DULCOLAX) suppository 10 mg, 10 mg, Rectal, Daily PRN, Taiwo Prado, APRN  •  budesonide (PULMICORT) nebulizer solution 0.5 mg, 0.5 mg, Nebulization, BID PRN, Taiwo Prado, APRN  •  carvedilol (COREG) tablet 12.5 mg, 12.5 mg, Oral, BID With Meals, Taiwo Prado, APRN  •  ceFAZolin in 0.9% normal saline (ANCEF) IVPB solution 2 g, 2 g, Intravenous, Q8H, Bandar Shea MD  •  cloNIDine (CATAPRES) tablet 0.1 mg, 0.1 mg, Oral, BID PRN, Taiwo Prado APRN  •  docusate  sodium (COLACE) capsule 100 mg, 100 mg, Oral, BID, Rust, Taiwo CM, APRN  •  [START ON 3/23/2021] furosemide (LASIX) tablet 40 mg, 40 mg, Oral, Daily, Bandar Shea MD  •  gabapentin (NEURONTIN) capsule 100 mg, 100 mg, Oral, Q12H, Rust, Taiwo CM, APRN  •  guaiFENesin (MUCINEX) 12 hr tablet 600 mg, 600 mg, Oral, Q12H, Rusmaribel, Taiwo CM, APRN  •  [START ON 3/23/2021] heparin (porcine) 5000 UNIT/ML injection 5,000 Units, 5,000 Units, Subcutaneous, Q12H, Bandar Shea MD  •  isosorbide mononitrate (IMDUR) 24 hr tablet 60 mg, 60 mg, Oral, BID, Rust, Taiwo CM, APRN  •  labetalol (NORMODYNE,TRANDATE) injection 20 mg, 20 mg, Intravenous, Q10 Min PRN, Johan, Taiwo CM, APRN  •  [START ON 3/23/2021] levothyroxine (SYNTHROID, LEVOTHROID) tablet 75 mcg, 75 mcg, Oral, Q AM, Rust, Taiwo CM, APRN  •  naloxone (NARCAN) injection 0.1 mg, 0.1 mg, Intravenous, Q5 Min PRN, Fernandot, Taiwo CM, APRN  •  nitroglycerin (NITROSTAT) SL tablet 0.4 mg, 0.4 mg, Sublingual, Q5 Min PRN, Johan, Taiwo CM, APRN  •  ondansetron (ZOFRAN) tablet 4 mg, 4 mg, Oral, Q6H PRN, Rust, Taiwo CM, APRN  •  oxyCODONE-acetaminophen (PERCOCET)  MG per tablet 1 tablet, 1 tablet, Oral, Q4H PRN, Rust, Taiwo CM, APRN  •  oxyCODONE-acetaminophen (PERCOCET) 7.5-325 MG per tablet 1 tablet, 1 tablet, Oral, Q4H PRN, Rust, Taiwo CM, APRN  •  [START ON 3/23/2021] pantoprazole (PROTONIX) EC tablet 40 mg, 40 mg, Oral, Q AM, Rust, Taiwo CM, APRN  •  [START ON 3/23/2021] polyethylene glycol (MIRALAX) packet 17 g, 17 g, Oral, Daily, Bandar Shea MD  •  [START ON 3/23/2021] predniSONE (DELTASONE) tablet 2 mg, 2 mg, Oral, Daily, Taiwo Prado APRN  •  saccharomyces boulardii (FLORASTOR) capsule 250 mg, 250 mg, Oral, BID, Taiwo Prado APRN  •  sennosides-docusate (PERICOLACE) 8.6-50 MG per tablet 2 tablet, 2 tablet, Oral, BID, Taiwo Prado APRN  •  sodium chloride 0.9 % flush 10 mL, 10 mL, Intravenous, Q12H, Taiwo Prado APRN  •  sodium chloride 0.9 % flush 10  mL, 10 mL, Intravenous, PRN, Taiwo Prado APRN  •  sodium chloride 0.9 % with KCl 20 mEq/L infusion, 100 mL/hr, Intravenous, Continuous, Taiwo Prado APRN    Medications: Scheduled Meds:carvedilol, 12.5 mg, Oral, BID With Meals  ceFAZolin, 2 g, Intravenous, Q8H  docusate sodium, 100 mg, Oral, BID  [START ON 3/23/2021] furosemide, 40 mg, Oral, Daily  gabapentin, 100 mg, Oral, Q12H  guaiFENesin, 600 mg, Oral, Q12H  [START ON 3/23/2021] heparin (porcine), 5,000 Units, Subcutaneous, Q12H  isosorbide mononitrate, 60 mg, Oral, BID  [START ON 3/23/2021] levothyroxine, 75 mcg, Oral, Q AM  [START ON 3/23/2021] pantoprazole, 40 mg, Oral, Q AM  [START ON 3/23/2021] polyethylene glycol, 17 g, Oral, Daily  [START ON 3/23/2021] predniSONE, 2 mg, Oral, Daily  saccharomyces boulardii, 250 mg, Oral, BID  senna-docusate sodium, 2 tablet, Oral, BID  sodium chloride, 10 mL, Intravenous, Q12H      Continuous Infusions:sodium chloride 0.9 % with KCl 20 mEq, 100 mL/hr      PRN Meds:.•  acetaminophen **OR** acetaminophen **OR** acetaminophen  •  bisacodyl  •  budesonide  •  cloNIDine  •  labetalol  •  naloxone  •  nitroglycerin  •  ondansetron  •  oxyCODONE-acetaminophen  •  oxyCODONE-acetaminophen  •  sodium chloride    Vital Signs  Temp:  [98.1 °F (36.7 °C)] 98.1 °F (36.7 °C)  Heart Rate:  [77] 77  Resp:  [18] 18  BP: (145)/(65) 145/65    Physical Exam  Physical Exam  Vitals and nursing note reviewed.   Constitutional:       General: She is not in acute distress.     Appearance: Normal appearance. She is well-developed and well-groomed. She is obese. She is not ill-appearing, toxic-appearing or diaphoretic.      Comments: BMI 38.24   HENT:      Head: Normocephalic and atraumatic.      Right Ear: Hearing normal.      Left Ear: Hearing normal.   Eyes:      Extraocular Movements: EOM normal.      Conjunctiva/sclera: Conjunctivae normal.      Pupils: Pupils are equal, round, and reactive to light.   Neck:      Trachea: Trachea normal.      Cardiovascular:      Rate and Rhythm: Normal rate and regular rhythm.   Pulmonary:      Effort: Pulmonary effort is normal. No tachypnea, bradypnea, accessory muscle usage or respiratory distress.   Abdominal:      Palpations: Abdomen is soft.   Musculoskeletal:      Cervical back: No edema, erythema or rigidity.   Skin:     General: Skin is warm and dry.   Neurological:      Mental Status: She is alert and oriented to person, place, and time.      GCS: GCS eye subscore is 4. GCS verbal subscore is 5. GCS motor subscore is 6.   Psychiatric:         Speech: Speech normal.         Behavior: Behavior normal. Behavior is cooperative.       Neurologic Exam     Mental Status   Oriented to person, place, and time.   Attention: normal. Concentration: normal.   Speech: speech is normal   Level of consciousness: alert    Bright and awake.  Oriented x3.  Follows commands without prompting showing thumbs up into fingers bilaterally.     Cranial Nerves     CN II   Visual fields full to confrontation.     CN III, IV, VI   Pupils are equal, round, and reactive to light.  Extraocular motions are normal.     CN V   Facial sensation intact.     CN VII   Facial expression full, symmetric.     CN VIII   CN VIII normal.     CN IX, X   CN IX normal.     CN XI   CN XI normal.     Motor Exam     Strength   Right deltoid: 5/5  Left deltoid: 5/5  Right biceps: 5/5  Left biceps: 5/5  Right triceps: 5/5  Left triceps: 5/5  Right wrist extension: 5/5  Left wrist extension: 5/5  Right interossei: 4/5  Left interossei: 4/5  Right iliopsoas: 2/5  Left iliopsoas: 5/5  Right quadriceps: 2/5  Left quadriceps: 5/5  Right anterior tibial: 3/5  Left anterior tibial: 5/5  Right gastroc: 4/5  Left gastroc: 5/5       Sensory Exam   Sensory deficit distribution on right: L1    Gait, Coordination, and Reflexes     Tremor   Resting tremor: absent  Intention tremor: absent  Action tremor: absent    Reflexes   Right plantar: upgoing  Left plantar:  normal  Right Mims: present  Left Mims: absent  Right ankle clonus: present  Left ankle clonus: present  Right pendular knee jerk: absent  Left pendular knee jerk: absent    Results Review:   Independent review and interpretation of imaging  Imaging Results (Last 24 Hours)     ** No results found for the last 24 hours. **        MRI brain:  MRI spine:     CT Head:  CT c-spine:  CT t-spine:  CT l-spine:  X-ray:    I reviewed the patient's new clinical results.  Lab Results (last 24 hours)     Procedure Component Value Units Date/Time    Comprehensive Metabolic Panel [[id number]]  (Abnormal) Collected: 03/22/21 1516    Specimen: Blood Updated: 03/22/21 1554     Glucose 113 mg/dL      BUN 7 mg/dL      Creatinine 0.38 mg/dL      Sodium 138 mmol/L      Potassium 4.1 mmol/L      Comment: Slight hemolysis detected by analyzer. Results may be affected.        Chloride 98 mmol/L      CO2 30.0 mmol/L      Calcium 8.8 mg/dL      Total Protein 6.5 g/dL      Albumin 2.60 g/dL      ALT (SGPT) <5 U/L      AST (SGOT) 16 U/L      Alkaline Phosphatase 98 U/L      Total Bilirubin 0.5 mg/dL      eGFR Non African Amer >150 mL/min/1.73      Globulin 3.9 gm/dL      A/G Ratio 0.7 g/dL      BUN/Creatinine Ratio 18.4     Anion Gap 10.0 mmol/L     Narrative:      GFR Normal >60  Chronic Kidney Disease <60  Kidney Failure <15      C-reactive Protein [[id number]]  (Abnormal) Collected: 03/22/21 1516    Specimen: Blood Updated: 03/22/21 1554     C-Reactive Protein 8.99 mg/dL     aPTT [[id number]]  (Abnormal) Collected: 03/22/21 1516    Specimen: Blood Updated: 03/22/21 1545     PTT 40.5 seconds     Protime-INR [[id number]]  (Abnormal) Collected: 03/22/21 1516    Specimen: Blood Updated: 03/22/21 1545     Protime 16.2 Seconds      INR 1.34    Sedimentation Rate [[id number]]  (Abnormal) Collected: 03/22/21 1516    Specimen: Blood Updated: 03/22/21 1538     Sed Rate 35 mm/hr     CBC Auto Differential [[id number]]  (Abnormal) Collected: 03/22/21  1516    Specimen: Blood Updated: 03/22/21 1533     WBC 6.02 10*3/mm3      RBC 3.67 10*6/mm3      Hemoglobin 10.3 g/dL      Hematocrit 32.9 %      MCV 89.6 fL      MCH 28.1 pg      MCHC 31.3 g/dL      RDW 16.6 %      RDW-SD 53.9 fl      MPV 9.6 fL      Platelets 230 10*3/mm3      Neutrophil % 66.3 %      Lymphocyte % 17.4 %      Monocyte % 11.8 %      Eosinophil % 3.2 %      Basophil % 0.8 %      Immature Grans % 0.5 %      Neutrophils, Absolute 3.99 10*3/mm3      Lymphocytes, Absolute 1.05 10*3/mm3      Monocytes, Absolute 0.71 10*3/mm3      Eosinophils, Absolute 0.19 10*3/mm3      Basophils, Absolute 0.05 10*3/mm3      Immature Grans, Absolute 0.03 10*3/mm3      nRBC 0.0 /100 WBC         Taiwo Prado, APRN

## 2021-03-22 NOTE — PLAN OF CARE
Goal Outcome Evaluation:  Pt A&Ox4. C/O mild to moderate pain, nothing given per pt request. No n/v changes. W/V in place to R hip. R hip drsg CDI. Plans for sx tomorrow. Npo after midnight. CHG bath needed. Pt has been comfortable in chair since arrival to floor this afternoon. Voiding per BSC. IVF infusing per order. VSS. Safety maintained.

## 2021-03-22 NOTE — DISCHARGE SUMMARY
ARGELIA Guerrero APRN      Internal Medicine Discharge Summary    Patient ID: Tawny Shin  MRN: [id number]     Acct:  [id number]       Patient's PCP: Davon Urrutia MD    Admit Date: 3/6/2021     Discharge Date:    3/22/21    Admitting Physician: Beni Urrutia MD    Discharge Physician: DANIELA Hill     Active Discharge Diagnoses:  MSSA bacteremia  Torn gluteus muscle s/p repair  Post-operative MSSA wound infection  Cervical spinal stenosis s/p corpectomy and decompression  RA  Chronic immunosuppression  Multifactorial anemia  Hypothyroidism  GERD  Hx CAD  Herpes simplex on chronic suppressive therapy  HTN  Right psoas abscess  L1-L2 discitis osteomyelitis  Constipation    Hospital Problems    * No active hospital problems. *     Past Medical History:   Diagnosis Date    Arthritis     Asthma     Chronic sinusitis     Coronary artery disease     Eustachian tube dysfunction     Heart disease     Herpes simplex     Hyperlipidemia     Hypertension     Knee dislocation     Labral tear of right hip joint     Laryngitis sicca     Laryngitis, chronic     MI (myocardial infarction) (CMS/HCC)     Otorrhea     Sensorineural hearing loss     Sjogren's disease (CMS/HCC)        The patient was seen and examined on the day of discharge and this discharge summary is in conjunction with any daily progress note from day of discharge.    Code Status:    There are no questions and answers to display.       Hospital Course: Tawny Shin is a  65 y.o.  female who presents with neck pain s/p cervical decompression. The patient had been in her usual state of health when she developed a torn right gluteal muscle. She underwent surgical repair on 1/14/21and post-operative course was progressing. At her 4 week follow up, she developed increased bloody drainage from the wound with erythema. She presented for injection due to RA at which time she suffered a syncopal  event. This was felt to be a vasovagal response and she returned to home. She was found later by a family member in the floor for an unknown length of time and the dressing to the right hip/gluteal wound was saturated.  She presented to the hospital and was noted to be febrile on intake. Workup revealed an elevated CPK and d dimer. Wound cultures were obtained. The patient was admitted to the service of the hospitalists at that time. She underwent pacemaker interrogation in workup for her syncopal episodes which detected no abnormalities. MRI brain negative for acute process. Purulent drainage from the wound was cultured and the patient was started on broad spectrum antibiotics. She was seen in consultation by orthopedics. Blood cultures returned positive for MSSA in all bottles. ID was consulted for antibiotic management at that time. She underwent debridement of the wound per ortho on 2/9. Post-operatively, the wound measures 20 cm x 6 cm and wound vac therapy was initiated. Surveillance cultures remained positive and surgical wound cultures positive for e. Coli. She continued with antibiotic therapy with Azactam under direction of ID. Due to persistent bacteremia, she underwent ESTEFANI which did not reveal any valvular vegetations. Due to her need for continued IV antibiotic therapy, wound care and rehabilitation, she transferred to the [facility]. She was seen in consultation by our wound care team and was followed by ID. She developed increased complaints of back pain and imaging revealed a right psoas abscess as well as possible L1-2 osteomyelitis discitis. Inflammatory markers were also elevated. The patient had also complained of increased weakness and incoordination to bilateral upper extremities. Neurosurgery was consulted and recommended cervical decompression as well as continued antibiotic therapy with plans for extension of lumbar fusion. She transferred to [facility] on 3/3 and underwent cervical corpectomy and  decompression. She tolerated the procedure without difficulty and returned to our facility.   The patient was initially progressing well with therapy upon her return, but she developed increased right hip pain impeding further progress. Multiple regimen adjustments were undertaken without relief. She continued on antibiotic treatment under the direction of ID and continued wound care under the direction of our wound care team. She developed increased edema to BLE requiring gentle diuresis. Orthopedic surgery was consulted to evaluate the hip who felt it was more likely related to the persistent psoas abscess noted on imaging. Originally, the patient was scheduled for repeat drainage of the abscess on 3/19, but she was going to require sedation and had eaten breakfast. The procedure was then rescheduled for today. Arrangements had been made for possible transfer to [facility] prior to her declining progress with therapy. Neurosurgery is now planning L1-L2 discectomy given ongoing discitis osteomyelitis in that region that has thus far not responded to antibiotic therapy. Following I and D of psoas abscess, the patient will discharge from our facility for admission to [facility] for planned neurosurgical procedure.     Consults:   Dr. Shea (neurosurgery)  Dr. Copeland (ID)    Disposition:  [facility]      Physical Exam  Vitals and nursing note reviewed.   Constitutional:       Appearance: Normal appearance.   HENT:      Head: Normocephalic and atraumatic.      Right Ear: External ear normal.      Left Ear: External ear normal.      Nose: Nose normal.      Mouth/Throat:      Mouth: Mucous membranes are moist.      Pharynx: Oropharynx is clear.   Eyes:      Extraocular Movements: Extraocular movements intact.      Pupils: Pupils are equal, round, and reactive to light.   Neck:      Comments: Cervical collar in place  Incision c/d/i  Cardiovascular:      Rate and Rhythm: Normal rate and regular rhythm.       Pulses: Normal pulses.      Heart sounds: Normal heart sounds.   Pulmonary:      Effort: Pulmonary effort is normal.      Breath sounds: Normal breath sounds.   Abdominal:      General: Abdomen is flat. Bowel sounds are normal.      Palpations: Abdomen is soft.      Tenderness: There is abdominal tenderness.      Comments: Lower abdominal fullness, hypoactive bowel sounds   Musculoskeletal:      Cervical back: Normal range of motion. Pain with movement present.      Right lower leg: Edema present.      Left lower leg: Edema present.      Comments: Generalized weakness  Limited ROM RLE  Mild lower extremity edema   Skin:     General: Skin is warm and dry.      Capillary Refill: Capillary refill takes less than 2 seconds.      Comments: Wound vac intact right hip   Neurological:      General: No focal deficit present.      Mental Status: She is alert and oriented to person, place, and time.   Psychiatric:         Mood and Affect: Mood normal.         Behavior: Behavior normal.         Thought Content: Thought content normal.     Discharged Condition: Stable    Follow Up: Taiwo Prado APRN  [address] [facility] [address]  [place] [id number]  [phone]    Follow up in 2 week(s)      Bandar Shea MD  [address] [facility] [address]  [place] [id number]  [phone]    Follow up in 6 week(s)        Diet: NPO Diet    Discharge Medications:   See computer generated discharge medication reconciliation    Time Spent on discharge is  32 minutes in patient examination, evaluation, patient/family counseling as well as medication reconciliation, prescriptions for required medications, discharge plan and follow up.     Electronically signed by DANIELA Hill on 3/22/2021 at 09:19 CDT     I have discussed the care of Tawny Shin, including pertinent history and exam findings, with the nurse practitioner.    I have seen and examined the patient and the key elements of all parts of the encounter have been  performed by me.  I agree with the assessment, plan and orders as documented by DANIELA Kyle, after I modified the exam findings and the plan of treatments and the final version is my approved version of the assessment.        Electronically signed by Beni Urrutia MD on 3/23/2021 at 21:37 CDT

## 2021-03-22 NOTE — PROGRESS NOTES
LTACH Fall Assessment Note    Tawny Shin is a 65 y.o.female  [Ht:  ; Wt: 104 kg (229 lb 12.8 oz)] admitted 3/6/2021 11:36 AM.    Current medications associated with an increased risk for fall include:  Carvedilol  Cyclobenzaprine  Fentanyl patch  Gabapentin  Isosorbide mononitrate  Clonidine  Hydromorphone  Labetalol  Nitroglycerin  Ondansetron  Angie Bee CARMEN  03/22/2112:26 CDT

## 2021-03-22 NOTE — PROGRESS NOTES
NEUROSURGERY DAILY PROGRESS NOTE    ASSESSMENT:   Tawny Shin is a 65 y.o. with a significant comorbidity rheumatoid arthritis on disease modifying agents, prior lumbar fusion by Dr. Oropeza in 2018.  She presents with a new problem of postop surgical infection of her right hip with wound VAC dressing, progressive right lower extremity proximal weakness without numbness or radiculopathy. Physical exam findings of right iliopsoas weakness, and global hyperreflexia with Babinski and right Patience's and decreased  strength bilaterally.  Their imaging shows CT of the cervical spine with severe cervical stenosis at severe stenosis C5-6 and C6/7 and adjacent segment disease at L1/2 and L2/3 with concern for osteomyelitis discitis.    Past Medical History:   Diagnosis Date   • Arthritis    • Asthma    • Chronic sinusitis    • Coronary artery disease    • Eustachian tube dysfunction    • Heart disease    • Herpes simplex    • Hyperlipidemia    • Hypertension    • Knee dislocation    • Labral tear of right hip joint    • Laryngitis sicca    • Laryngitis, chronic    • MI (myocardial infarction) (CMS/HCC)    • Otorrhea    • Sensorineural hearing loss    • Sjogren's disease (CMS/HCC)      There are no active hospital problems to display for this patient.    PLAN:   Neuro: Stable.  Improved  strength.  Worsening right anterior thigh pain. Right LE weakness relativity unchanged.       Cervical stenosis    POD #16 C6 corpectomy   GUILLERMO DC'd   Cervical collar in place.  Trachea midline.  No difficulty with swallowing   Postop x-rays stable   Continue neurochecks every 4 hours   Ready to discharge from a neurosurgical perspective   Neurosurgery will continue to follow     Right psoas abscess   I&D right psoas abscess performed per IR (3/2/2021)   Body fluid culture: 4+ WBCs, rare 1+ gram-positive cocci   Light growth 2+ Staph aureus   Antibiotics per ID   Plan for repeat I&D right psoas abscess per IR today    Concern for  discitis/osteomyelitis L1/2  L1/2 severe central canal stenosis  Severe stenosis from adjacent segment disease   We will continue to monitor for now   May require L1 laminectomy with possible discectomy, foraminotomy, and debridement in the near future   Antibiotics per ID   LSO brace when out of bed   Repeat MRI of the lumbar spine shows slight worsening of STIR signal change within the L1-2 disc space, disc herniation at L1-2 resulting in stenosis that appears slightly improved from previous exam.    May benefit from L1-2 discectomy     CV: JADEN.  VS WNL  Pulm: No acute issues.  Maintaining O2 sat.  Continuous pulse oximetry   : JADEN.  Voiding spontaneously.  Bladder scans and I/O cath per policy  FEN: Cleared for oral diet.  Meds in apple sauce.   GI: Prophylaxis  ORTHO: Open wound right posterior hip.  MSSA infection, wound vac per PT   3/4/2021: Scant growth Staphylococcus aureus        Scant growth Enterococcus     Continue wound vac    ID: MSSA bacteria and Enterococcus.  Postop vancomycin Complete   WBC normal. CRP waxes and wanes, 6-14.53-8.80.  ESR 55.   Cafazolin per ID    Heme:  DVT prophylaxis; SCD's  Pain: Tolerable at present with oral meds  Dispo: PT/OT.        CHIEF COMPLAINT:   Right-sided lumbar back pain with a new complaint of right anterior thigh pain    Subjective  Symptom stable.     Objective:  General Appearance:  Comfortable, well-appearing and in no acute distress.    Vital signs: (most recent): Weight 104 kg (229 lb 12.8 oz), not currently breastfeeding.      Neurologic Exam     Mental Status   Oriented to person, place, and time.   Attention: normal. Concentration: normal.   Speech: speech is normal   Level of consciousness: alert    Bright and awake.  Oriented x3.  Follows commands without prompting showing thumbs up into fingers bilaterally.     Cranial Nerves     CN II   Visual fields full to confrontation.     CN III, IV, VI   Pupils are equal, round, and reactive to  light.  Extraocular motions are normal.     CN V   Facial sensation intact.     CN VII   Facial expression full, symmetric.     CN VIII   CN VIII normal.     CN IX, X   CN IX normal.     CN XI   CN XI normal.     Motor Exam     Strength   Right deltoid: 5/5  Left deltoid: 5/5  Right biceps: 5/5  Left biceps: 5/5  Right triceps: 5/5  Left triceps: 5/5  Right wrist extension: 5/5  Left wrist extension: 5/5  Right interossei: 4/5  Left interossei: 4/5  Right iliopsoas: 2/5  Left iliopsoas: 5/5  Right quadriceps: 2/5  Left quadriceps: 5/5  Right anterior tibial: 3/5  Left anterior tibial: 5/5  Right gastroc: 4/5  Left gastroc: 5/5       Sensory Exam   Sensory deficit distribution on right: L1    Gait, Coordination, and Reflexes     Tremor   Resting tremor: absent  Intention tremor: absent  Action tremor: absent    Reflexes   Right plantar: upgoing  Left plantar: normal  Right Mims: present  Left Mims: absent  Right ankle clonus: present  Left ankle clonus: present  Right pendular knee jerk: absent  Left pendular knee jerk: absent    Incision: C, D, I    Drains: * No LDAs found *    Imaging Results (Last 24 Hours)     Procedure Component Value Units Date/Time    CT Guided Abscess Drain Retroperitoneal [[id number]] Resulted: 03/22/21 0830     Updated: 03/22/21 0830        Lab Results (last 24 hours)     Procedure Component Value Units Date/Time    COVID-19, ABBOTT IN-HOUSE,NASAL Swab (NO TRANSPORT MEDIA) 2 HR TAT - Swab, Nasopharynx [[id number]]  (Normal) Collected: 03/22/21 0549    Specimen: Swab from Nasopharynx Updated: 03/22/21 0639     COVID19 Presumptive Negative    Narrative:      Fact sheet for providers: https://www.fda.gov/media/425300/download     Fact sheet for patients: https://www.fda.gov/media/504634/download    Test performed by PCR.  If inconsistent with clinical signs and symptoms patient should be tested with different authorized molecular test.        Taiwo Prado, APRN

## 2021-03-22 NOTE — PROGRESS NOTES
Infectious Diseases Progress Note    Patient:  Tawny Shin  YOB: 1955  MRN: [id number]   Admit date: 3/6/2021   Admitting Physician: Beni Urrutia MD  Primary Care Physician: Davon Urrutia MD    Chief Complaint/Interval History: Patient seen and examined this morning while she was on LTAC.  She was sitting at the bedside.  She actually looks a little more comfortable than she did last week, but she continues to have some right hip discomfort.  She is only able to walk short distances.  Had left message for neurosurgery last week.  Was able to speak with them today they were examining her as well.  He had a chance to review her MRI as well.  Felt she may benefit from laminectomy.  She reports no new or extremity neurological symptoms.  She is agreeable to CT-guided right psoas abscess drainage today.    No intake or output data in the 24 hours ending 03/22/21 3026  Allergies:   Allergies   Allergen Reactions   • Atorvastatin Other (See Comments)     LEG CRAMPS     • Amoxicillin Rash   • Escitalopram Rash   • Nabumetone Rash   • Niacin Er Rash   • Penicillins Rash   • Simvastatin Rash     Current Scheduled Medications:   ascorbic acid, 500 mg, Oral, BID  carvedilol, 25 mg, Oral, BID With Meals  ceFAZolin, 2 g, Intravenous, Q8H  fentaNYL, 1 patch, Transdermal, Q72H   And  Check Fentanyl Patch Placement, 1 each, Does not apply, Q12H  cyclobenzaprine, 10 mg, Oral, TID  docusate sodium, 100 mg, Oral, BID  folic acid, 1 mg, Oral, Daily  gabapentin, 100 mg, Oral, Q12H  heparin (porcine), 5,000 Units, Subcutaneous, Q12H  isosorbide mononitrate, 60 mg, Oral, BID - Nitrates  levothyroxine, 75 mcg, Oral, Q AM  methylnaltrexone, 6 mg, Subcutaneous, Every Other Day  multivitamin with minerals, 1 tablet, Oral, Daily  pantoprazole, 40 mg, Oral, QAM AC  polyethylene glycol, 17 g, Oral, Daily  predniSONE, 5 mg, Oral, Daily With Breakfast  saccharomyces boulardii, 250 mg, Oral, BID  senna-docusate  sodium, 2 tablet, Oral, BID  sodium chloride, 10 mL, Intravenous, Q12H  valACYclovir, 500 mg, Oral, Q24H  zinc sulfate, 220 mg, Oral, Daily      Current PRN Medications:  •  acetaminophen **OR** acetaminophen  •  bisacodyl  •  budesonide  •  cloNIDine  •  HYDROmorphone  •  labetalol  •  lidocaine  •  magnesium hydroxide  •  Menthol (Topical Analgesic)  •  naloxone  •  nitroglycerin  •  ondansetron **OR** ondansetron  •  oxyCODONE-acetaminophen  •  sodium chloride    Review of Systems see HPI.    Vital Signs:  Wt 104 kg (229 lb 12.8 oz)   BMI 38.24 kg/m²     Physical Exam  Vital signs - reviewed.  Line/IV site - No erythema, warmth, induration, or tenderness.    Lungs clear without crackles  Heart without murmur  Lower extremity sensation is intact  Wound VAC in place right hip  No surrounding erythema    Lab Results:  CBC:   Results from last 7 days   Lab Units 03/19/21  0418 03/18/21  0534   WBC 10*3/mm3 5.85 6.20   HEMOGLOBIN g/dL 9.7* 9.3*   HEMATOCRIT % 31.1* 29.5*   PLATELETS 10*3/mm3 260 237     BMP:  Results from last 7 days   Lab Units 03/19/21  0418 03/18/21  0534   SODIUM mmol/L 138 139   POTASSIUM mmol/L 3.8 4.3   CHLORIDE mmol/L 98 100   CO2 mmol/L 32.0* 32.0*   BUN mg/dL 9 8   CREATININE mg/dL 0.43* 0.42*   GLUCOSE mg/dL 79 86   CALCIUM mg/dL 8.8 8.7   ALT (SGPT) U/L <5  --      Radiology: None  MRI lumbar spine done today:  IMPRESSION:  1. Continued discitis at L1-2 with rim enhancement of the disc. There is  osteomyelitis in the adjacent vertebral body endplates. There is a  moderate to large size central disc herniation prolapsing superiorly.  There is facet arthropathy and thickening of ligamentum flavum. There is  at least moderate narrowing of the central canal. There is foraminal  stenosis right greater than left. There is probably some enhancing  granulation tissue around the disc herniation.  2. Right psoas fluid collection/abscess is slightly smaller in size.  3. Postoperative and  degenerative changes are noted at other levels.  Please see the above report.  This report was finalized on 03/18/2021 11:59 by Dr. Zurdo Vazquez MD.    Additional Studies Reviewed: None    Impression:   1.  MSSA bacteremia  2.  Postoperative infection right hip following gluteus muscle repair  3.  Right psoas abscess-MSSA  4.  Discitis/osteomyelitis L1/L2  5.  Cervical stenosis-underwent previous surgical decompression  6.  Arthritis    Recommendations:   Continue IV cefazolin  Continue with plans for CT-guided right psoas abscess drainage today  Discussed with neurosurgery-she may be admitted to acute care for laminectomy  Discussed with head nurse  We will continue to follow acute [facility] if she is readmitted for low back surgery  Continue to follow    Elie Ohara MD

## 2021-03-23 ENCOUNTER — ANESTHESIA EVENT (OUTPATIENT)
Dept: PERIOP | Facility: HOSPITAL | Age: 66
End: 2021-03-23

## 2021-03-23 ENCOUNTER — APPOINTMENT (OUTPATIENT)
Dept: GENERAL RADIOLOGY | Facility: HOSPITAL | Age: 66
End: 2021-03-23

## 2021-03-23 ENCOUNTER — ANESTHESIA (OUTPATIENT)
Dept: PERIOP | Facility: HOSPITAL | Age: 66
End: 2021-03-23

## 2021-03-23 LAB
ABO GROUP BLD: NORMAL
ALBUMIN SERPL-MCNC: 2.3 G/DL (ref 3.5–5.2)
ALBUMIN/GLOB SERPL: 0.7 G/DL
ALP SERPL-CCNC: 88 U/L (ref 39–117)
ALT SERPL W P-5'-P-CCNC: <5 U/L (ref 1–33)
ANION GAP SERPL CALCULATED.3IONS-SCNC: 10 MMOL/L (ref 5–15)
AST SERPL-CCNC: 17 U/L (ref 1–32)
BASOPHILS # BLD AUTO: 0.04 10*3/MM3 (ref 0–0.2)
BASOPHILS NFR BLD AUTO: 0.7 % (ref 0–1.5)
BILIRUB SERPL-MCNC: 0.4 MG/DL (ref 0–1.2)
BLD GP AB SCN SERPL QL: NEGATIVE
BUN SERPL-MCNC: 7 MG/DL (ref 8–23)
BUN/CREAT SERPL: 20 (ref 7–25)
CALCIUM SPEC-SCNC: 7.9 MG/DL (ref 8.6–10.5)
CHLORIDE SERPL-SCNC: 100 MMOL/L (ref 98–107)
CO2 SERPL-SCNC: 27 MMOL/L (ref 22–29)
CREAT SERPL-MCNC: 0.35 MG/DL (ref 0.57–1)
DEPRECATED RDW RBC AUTO: 53.5 FL (ref 37–54)
EOSINOPHIL # BLD AUTO: 0.21 10*3/MM3 (ref 0–0.4)
EOSINOPHIL NFR BLD AUTO: 3.9 % (ref 0.3–6.2)
ERYTHROCYTE [DISTWIDTH] IN BLOOD BY AUTOMATED COUNT: 16.5 % (ref 12.3–15.4)
GFR SERPL CREATININE-BSD FRML MDRD: >150 ML/MIN/1.73
GLOBULIN UR ELPH-MCNC: 3.3 GM/DL
GLUCOSE SERPL-MCNC: 78 MG/DL (ref 65–99)
HCT VFR BLD AUTO: 29.4 % (ref 34–46.6)
HGB BLD-MCNC: 9.2 G/DL (ref 12–15.9)
IMM GRANULOCYTES # BLD AUTO: 0.04 10*3/MM3 (ref 0–0.05)
IMM GRANULOCYTES NFR BLD AUTO: 0.7 % (ref 0–0.5)
LYMPHOCYTES # BLD AUTO: 1.28 10*3/MM3 (ref 0.7–3.1)
LYMPHOCYTES NFR BLD AUTO: 23.7 % (ref 19.6–45.3)
MCH RBC QN AUTO: 28 PG (ref 26.6–33)
MCHC RBC AUTO-ENTMCNC: 31.3 G/DL (ref 31.5–35.7)
MCV RBC AUTO: 89.6 FL (ref 79–97)
MONOCYTES # BLD AUTO: 0.81 10*3/MM3 (ref 0.1–0.9)
MONOCYTES NFR BLD AUTO: 15 % (ref 5–12)
NEUTROPHILS NFR BLD AUTO: 3.03 10*3/MM3 (ref 1.7–7)
NEUTROPHILS NFR BLD AUTO: 56 % (ref 42.7–76)
NRBC BLD AUTO-RTO: 0 /100 WBC (ref 0–0.2)
PLATELET # BLD AUTO: 210 10*3/MM3 (ref 140–450)
PMV BLD AUTO: 9.6 FL (ref 6–12)
POTASSIUM SERPL-SCNC: 4 MMOL/L (ref 3.5–5.2)
PROT SERPL-MCNC: 5.6 G/DL (ref 6–8.5)
QT INTERVAL: 378 MS
QT INTERVAL: 388 MS
QTC INTERVAL: 444 MS
QTC INTERVAL: 444 MS
RBC # BLD AUTO: 3.28 10*6/MM3 (ref 3.77–5.28)
RH BLD: POSITIVE
SODIUM SERPL-SCNC: 137 MMOL/L (ref 136–145)
T&S EXPIRATION DATE: NORMAL
WBC # BLD AUTO: 5.41 10*3/MM3 (ref 3.4–10.8)

## 2021-03-23 PROCEDURE — 97162 PT EVAL MOD COMPLEX 30 MIN: CPT | Performed by: PHYSICAL THERAPIST

## 2021-03-23 PROCEDURE — 72040 X-RAY EXAM NECK SPINE 2-3 VW: CPT

## 2021-03-23 PROCEDURE — 86850 RBC ANTIBODY SCREEN: CPT | Performed by: NEUROLOGICAL SURGERY

## 2021-03-23 PROCEDURE — 25010000002 VANCOMYCIN 1 G RECONSTITUTED SOLUTION: Performed by: NEUROLOGICAL SURGERY

## 2021-03-23 PROCEDURE — 25010000002 HEPARIN (PORCINE) PER 1000 UNITS: Performed by: NURSE PRACTITIONER

## 2021-03-23 PROCEDURE — 99231 SBSQ HOSP IP/OBS SF/LOW 25: CPT | Performed by: NURSE PRACTITIONER

## 2021-03-23 PROCEDURE — 25010000002 PROPOFOL 10 MG/ML EMULSION: Performed by: NURSE ANESTHETIST, CERTIFIED REGISTERED

## 2021-03-23 PROCEDURE — 72100 X-RAY EXAM L-S SPINE 2/3 VWS: CPT

## 2021-03-23 PROCEDURE — 76000 FLUOROSCOPY <1 HR PHYS/QHP: CPT

## 2021-03-23 PROCEDURE — 87070 CULTURE OTHR SPECIMN AEROBIC: CPT | Performed by: NEUROLOGICAL SURGERY

## 2021-03-23 PROCEDURE — 25010000002 VANCOMYCIN 10 G RECONSTITUTED SOLUTION: Performed by: NEUROLOGICAL SURGERY

## 2021-03-23 PROCEDURE — 97606 NEG PRS WND THER DME>50 SQCM: CPT | Performed by: PHYSICAL THERAPIST

## 2021-03-23 PROCEDURE — 25010000002 ONDANSETRON PER 1 MG: Performed by: NURSE ANESTHETIST, CERTIFIED REGISTERED

## 2021-03-23 PROCEDURE — 88304 TISSUE EXAM BY PATHOLOGIST: CPT | Performed by: NEUROLOGICAL SURGERY

## 2021-03-23 PROCEDURE — 86900 BLOOD TYPING SEROLOGIC ABO: CPT | Performed by: NEUROLOGICAL SURGERY

## 2021-03-23 PROCEDURE — 25010000002 CEFAZOLIN PER 500 MG: Performed by: NURSE PRACTITIONER

## 2021-03-23 PROCEDURE — 25010000002 DEXAMETHASONE PER 1 MG: Performed by: NURSE ANESTHETIST, CERTIFIED REGISTERED

## 2021-03-23 PROCEDURE — 85025 COMPLETE CBC W/AUTO DIFF WBC: CPT | Performed by: NURSE PRACTITIONER

## 2021-03-23 PROCEDURE — 86901 BLOOD TYPING SEROLOGIC RH(D): CPT | Performed by: NEUROLOGICAL SURGERY

## 2021-03-23 PROCEDURE — 80053 COMPREHEN METABOLIC PANEL: CPT | Performed by: NURSE PRACTITIONER

## 2021-03-23 PROCEDURE — 25010000002 PHENYLEPHRINE HCL 0.8 MG/10ML SOLUTION PREFILLED SYRINGE: Performed by: NURSE ANESTHETIST, CERTIFIED REGISTERED

## 2021-03-23 PROCEDURE — 87205 SMEAR GRAM STAIN: CPT | Performed by: NEUROLOGICAL SURGERY

## 2021-03-23 PROCEDURE — 0SB20ZZ EXCISION OF LUMBAR VERTEBRAL DISC, OPEN APPROACH: ICD-10-PCS | Performed by: NEUROLOGICAL SURGERY

## 2021-03-23 PROCEDURE — 25010000002 VANCOMYCIN 10 G RECONSTITUTED SOLUTION: Performed by: NURSE PRACTITIONER

## 2021-03-23 PROCEDURE — 25010000002 SUCCINYLCHOLINE PER 20 MG: Performed by: NURSE ANESTHETIST, CERTIFIED REGISTERED

## 2021-03-23 PROCEDURE — 25010000002 CEFAZOLIN PER 500 MG: Performed by: NEUROLOGICAL SURGERY

## 2021-03-23 PROCEDURE — 25010000002 FENTANYL CITRATE (PF) 100 MCG/2ML SOLUTION: Performed by: ANESTHESIOLOGY

## 2021-03-23 PROCEDURE — 25010000002 FENTANYL CITRATE (PF) 100 MCG/2ML SOLUTION: Performed by: NURSE ANESTHETIST, CERTIFIED REGISTERED

## 2021-03-23 PROCEDURE — 88311 DECALCIFY TISSUE: CPT | Performed by: NEUROLOGICAL SURGERY

## 2021-03-23 PROCEDURE — 97166 OT EVAL MOD COMPLEX 45 MIN: CPT | Performed by: OCCUPATIONAL THERAPIST

## 2021-03-23 PROCEDURE — 25010000002 HYDROMORPHONE PER 4 MG: Performed by: ANESTHESIOLOGY

## 2021-03-23 PROCEDURE — 63030 LAMOT DCMPRN NRV RT 1 LMBR: CPT | Performed by: NEUROLOGICAL SURGERY

## 2021-03-23 PROCEDURE — 25810000003 SODIUM CHLORIDE 0.9 % WITH KCL 20 MEQ 20-0.9 MEQ/L-% SOLUTION: Performed by: NURSE PRACTITIONER

## 2021-03-23 PROCEDURE — 99024 POSTOP FOLLOW-UP VISIT: CPT | Performed by: NURSE PRACTITIONER

## 2021-03-23 DEVICE — KT HEMOST ABS SURGIFOAM PORCN 1GRAM: Type: IMPLANTABLE DEVICE | Site: SPINE LUMBAR | Status: FUNCTIONAL

## 2021-03-23 DEVICE — HEMOST ABS SURGIFOAM SZ100 8X12 10MM: Type: IMPLANTABLE DEVICE | Site: SPINE LUMBAR | Status: FUNCTIONAL

## 2021-03-23 RX ORDER — VANCOMYCIN HYDROCHLORIDE 1 G/20ML
INJECTION, POWDER, LYOPHILIZED, FOR SOLUTION INTRAVENOUS AS NEEDED
Status: DISCONTINUED | OUTPATIENT
Start: 2021-03-23 | End: 2021-03-23 | Stop reason: HOSPADM

## 2021-03-23 RX ORDER — LABETALOL HYDROCHLORIDE 5 MG/ML
5 INJECTION, SOLUTION INTRAVENOUS
Status: DISCONTINUED | OUTPATIENT
Start: 2021-03-23 | End: 2021-03-23 | Stop reason: HOSPADM

## 2021-03-23 RX ORDER — OXYCODONE AND ACETAMINOPHEN 10; 325 MG/1; MG/1
1 TABLET ORAL ONCE AS NEEDED
Status: DISCONTINUED | OUTPATIENT
Start: 2021-03-23 | End: 2021-03-23 | Stop reason: HOSPADM

## 2021-03-23 RX ORDER — CARVEDILOL 25 MG/1
25 TABLET ORAL 2 TIMES DAILY WITH MEALS
COMMUNITY
End: 2021-07-01 | Stop reason: SDUPTHER

## 2021-03-23 RX ORDER — LIDOCAINE HYDROCHLORIDE 10 MG/ML
0.5 INJECTION, SOLUTION EPIDURAL; INFILTRATION; INTRACAUDAL; PERINEURAL ONCE AS NEEDED
Status: DISCONTINUED | OUTPATIENT
Start: 2021-03-23 | End: 2021-03-23 | Stop reason: HOSPADM

## 2021-03-23 RX ORDER — SODIUM CHLORIDE, SODIUM LACTATE, POTASSIUM CHLORIDE, CALCIUM CHLORIDE 600; 310; 30; 20 MG/100ML; MG/100ML; MG/100ML; MG/100ML
20 INJECTION, SOLUTION INTRAVENOUS CONTINUOUS
Status: DISCONTINUED | OUTPATIENT
Start: 2021-03-23 | End: 2021-03-23

## 2021-03-23 RX ORDER — LIDOCAINE 50 MG/G
1 PATCH TOPICAL DAILY PRN
COMMUNITY
End: 2021-06-08

## 2021-03-23 RX ORDER — SUCCINYLCHOLINE CHLORIDE 20 MG/ML
INJECTION INTRAMUSCULAR; INTRAVENOUS AS NEEDED
Status: DISCONTINUED | OUTPATIENT
Start: 2021-03-23 | End: 2021-03-23 | Stop reason: SURG

## 2021-03-23 RX ORDER — OXYCODONE AND ACETAMINOPHEN 10; 325 MG/1; MG/1
2 TABLET ORAL EVERY 8 HOURS PRN
COMMUNITY
End: 2021-03-26 | Stop reason: HOSPADM

## 2021-03-23 RX ORDER — FENTANYL CITRATE 50 UG/ML
INJECTION, SOLUTION INTRAMUSCULAR; INTRAVENOUS AS NEEDED
Status: DISCONTINUED | OUTPATIENT
Start: 2021-03-23 | End: 2021-03-23 | Stop reason: SURG

## 2021-03-23 RX ORDER — LIDOCAINE HYDROCHLORIDE 20 MG/ML
INJECTION, SOLUTION EPIDURAL; INFILTRATION; INTRACAUDAL; PERINEURAL AS NEEDED
Status: DISCONTINUED | OUTPATIENT
Start: 2021-03-23 | End: 2021-03-23 | Stop reason: SURG

## 2021-03-23 RX ORDER — SODIUM CHLORIDE 0.9 % (FLUSH) 0.9 %
3 SYRINGE (ML) INJECTION EVERY 12 HOURS SCHEDULED
Status: DISCONTINUED | OUTPATIENT
Start: 2021-03-23 | End: 2021-03-23 | Stop reason: HOSPADM

## 2021-03-23 RX ORDER — ONDANSETRON 2 MG/ML
INJECTION INTRAMUSCULAR; INTRAVENOUS AS NEEDED
Status: DISCONTINUED | OUTPATIENT
Start: 2021-03-23 | End: 2021-03-23 | Stop reason: SURG

## 2021-03-23 RX ORDER — FOLIC ACID 1 MG/1
1 TABLET ORAL DAILY
COMMUNITY
End: 2021-03-26 | Stop reason: HOSPADM

## 2021-03-23 RX ORDER — FLUMAZENIL 0.1 MG/ML
0.2 INJECTION INTRAVENOUS AS NEEDED
Status: DISCONTINUED | OUTPATIENT
Start: 2021-03-23 | End: 2021-03-23 | Stop reason: HOSPADM

## 2021-03-23 RX ORDER — PREDNISONE 1 MG/1
5 TABLET ORAL
COMMUNITY
End: 2021-03-26 | Stop reason: HOSPADM

## 2021-03-23 RX ORDER — FENTANYL CITRATE 50 UG/ML
25 INJECTION, SOLUTION INTRAMUSCULAR; INTRAVENOUS
Status: DISCONTINUED | OUTPATIENT
Start: 2021-03-23 | End: 2021-03-23 | Stop reason: HOSPADM

## 2021-03-23 RX ORDER — SODIUM CHLORIDE, SODIUM LACTATE, POTASSIUM CHLORIDE, CALCIUM CHLORIDE 600; 310; 30; 20 MG/100ML; MG/100ML; MG/100ML; MG/100ML
100 INJECTION, SOLUTION INTRAVENOUS CONTINUOUS
Status: DISCONTINUED | OUTPATIENT
Start: 2021-03-23 | End: 2021-03-23

## 2021-03-23 RX ORDER — DEXAMETHASONE SODIUM PHOSPHATE 4 MG/ML
INJECTION, SOLUTION INTRA-ARTICULAR; INTRALESIONAL; INTRAMUSCULAR; INTRAVENOUS; SOFT TISSUE AS NEEDED
Status: DISCONTINUED | OUTPATIENT
Start: 2021-03-23 | End: 2021-03-23 | Stop reason: SURG

## 2021-03-23 RX ORDER — SODIUM CHLORIDE 0.9 % (FLUSH) 0.9 %
3-10 SYRINGE (ML) INJECTION AS NEEDED
Status: DISCONTINUED | OUTPATIENT
Start: 2021-03-23 | End: 2021-03-23 | Stop reason: HOSPADM

## 2021-03-23 RX ORDER — HYDROMORPHONE HYDROCHLORIDE 1 MG/ML
0.2 INJECTION, SOLUTION INTRAMUSCULAR; INTRAVENOUS; SUBCUTANEOUS
Status: DISCONTINUED | OUTPATIENT
Start: 2021-03-23 | End: 2021-03-23 | Stop reason: HOSPADM

## 2021-03-23 RX ORDER — IBUPROFEN 600 MG/1
600 TABLET ORAL ONCE AS NEEDED
Status: DISCONTINUED | OUTPATIENT
Start: 2021-03-23 | End: 2021-03-23 | Stop reason: HOSPADM

## 2021-03-23 RX ORDER — ACETAMINOPHEN 500 MG
1000 TABLET ORAL ONCE
Status: COMPLETED | OUTPATIENT
Start: 2021-03-23 | End: 2021-03-23

## 2021-03-23 RX ORDER — MAGNESIUM HYDROXIDE 1200 MG/15ML
LIQUID ORAL AS NEEDED
Status: DISCONTINUED | OUTPATIENT
Start: 2021-03-23 | End: 2021-03-23 | Stop reason: HOSPADM

## 2021-03-23 RX ORDER — FENTANYL 25 UG/H
1 PATCH TRANSDERMAL
COMMUNITY
Start: 2021-03-18 | End: 2021-03-26 | Stop reason: HOSPADM

## 2021-03-23 RX ORDER — PHENYLEPHRINE HCL IN 0.9% NACL 0.8MG/10ML
SYRINGE (ML) INTRAVENOUS AS NEEDED
Status: DISCONTINUED | OUTPATIENT
Start: 2021-03-23 | End: 2021-03-23 | Stop reason: SURG

## 2021-03-23 RX ORDER — PROPOFOL 10 MG/ML
VIAL (ML) INTRAVENOUS AS NEEDED
Status: DISCONTINUED | OUTPATIENT
Start: 2021-03-23 | End: 2021-03-23 | Stop reason: SURG

## 2021-03-23 RX ORDER — NEOSTIGMINE METHYLSULFATE 5 MG/5 ML
SYRINGE (ML) INTRAVENOUS AS NEEDED
Status: DISCONTINUED | OUTPATIENT
Start: 2021-03-23 | End: 2021-03-23 | Stop reason: SURG

## 2021-03-23 RX ORDER — ROCURONIUM BROMIDE 10 MG/ML
INJECTION, SOLUTION INTRAVENOUS AS NEEDED
Status: DISCONTINUED | OUTPATIENT
Start: 2021-03-23 | End: 2021-03-23 | Stop reason: SURG

## 2021-03-23 RX ORDER — NALOXONE HCL 0.4 MG/ML
0.04 VIAL (ML) INJECTION AS NEEDED
Status: DISCONTINUED | OUTPATIENT
Start: 2021-03-23 | End: 2021-03-23 | Stop reason: HOSPADM

## 2021-03-23 RX ORDER — BUPIVACAINE HCL/0.9 % NACL/PF 0.1 %
2 PLASTIC BAG, INJECTION (ML) EPIDURAL EVERY 8 HOURS SCHEDULED
COMMUNITY
Start: 2021-03-06 | End: 2021-03-26 | Stop reason: HOSPADM

## 2021-03-23 RX ORDER — ONDANSETRON 2 MG/ML
4 INJECTION INTRAMUSCULAR; INTRAVENOUS AS NEEDED
Status: DISCONTINUED | OUTPATIENT
Start: 2021-03-23 | End: 2021-03-23 | Stop reason: HOSPADM

## 2021-03-23 RX ORDER — PANTOPRAZOLE SODIUM 40 MG/1
40 TABLET, DELAYED RELEASE ORAL
COMMUNITY
End: 2021-03-26 | Stop reason: HOSPADM

## 2021-03-23 RX ORDER — VANCOMYCIN HYDROCHLORIDE 1 G/20ML
INJECTION, POWDER, LYOPHILIZED, FOR SOLUTION INTRAVENOUS AS NEEDED
Status: DISCONTINUED | OUTPATIENT
Start: 2021-03-23 | End: 2021-03-23

## 2021-03-23 RX ADMIN — HEPARIN SODIUM 5000 UNITS: 5000 INJECTION INTRAVENOUS; SUBCUTANEOUS at 17:43

## 2021-03-23 RX ADMIN — Medication 80 MCG: at 09:46

## 2021-03-23 RX ADMIN — VANCOMYCIN HYDROCHLORIDE 750 MG: 10 INJECTION, POWDER, LYOPHILIZED, FOR SOLUTION INTRAVENOUS at 21:38

## 2021-03-23 RX ADMIN — VANCOMYCIN HYDROCHLORIDE 1500 MG: 10 INJECTION, POWDER, LYOPHILIZED, FOR SOLUTION INTRAVENOUS at 09:05

## 2021-03-23 RX ADMIN — ACETAMINOPHEN 1000 MG: 500 TABLET, FILM COATED ORAL at 09:10

## 2021-03-23 RX ADMIN — ONDANSETRON HYDROCHLORIDE 4 MG: 2 SOLUTION INTRAMUSCULAR; INTRAVENOUS at 11:26

## 2021-03-23 RX ADMIN — Medication 3 MG: at 11:36

## 2021-03-23 RX ADMIN — Medication 80 MCG: at 11:31

## 2021-03-23 RX ADMIN — HYDROMORPHONE HYDROCHLORIDE 0.2 MG: 1 INJECTION, SOLUTION INTRAMUSCULAR; INTRAVENOUS; SUBCUTANEOUS at 12:19

## 2021-03-23 RX ADMIN — FENTANYL CITRATE 50 MCG: 50 INJECTION, SOLUTION INTRAMUSCULAR; INTRAVENOUS at 09:34

## 2021-03-23 RX ADMIN — DOCUSATE SODIUM 50 MG AND SENNOSIDES 8.6 MG 2 TABLET: 8.6; 5 TABLET, FILM COATED ORAL at 21:38

## 2021-03-23 RX ADMIN — FENTANYL CITRATE 25 MCG: 50 INJECTION, SOLUTION INTRAMUSCULAR; INTRAVENOUS at 11:31

## 2021-03-23 RX ADMIN — Medication 250 MG: at 21:38

## 2021-03-23 RX ADMIN — CEFAZOLIN SODIUM 2 G: 10 INJECTION, POWDER, FOR SOLUTION INTRAVENOUS at 23:04

## 2021-03-23 RX ADMIN — Medication 80 MCG: at 11:25

## 2021-03-23 RX ADMIN — SODIUM CHLORIDE, PRESERVATIVE FREE 10 ML: 5 INJECTION INTRAVENOUS at 21:38

## 2021-03-23 RX ADMIN — Medication 40 MCG: at 11:05

## 2021-03-23 RX ADMIN — FENTANYL CITRATE 50 MCG: 50 INJECTION, SOLUTION INTRAMUSCULAR; INTRAVENOUS at 09:37

## 2021-03-23 RX ADMIN — CARVEDILOL 12.5 MG: 6.25 TABLET, FILM COATED ORAL at 17:43

## 2021-03-23 RX ADMIN — GUAIFENESIN 600 MG: 600 TABLET, EXTENDED RELEASE ORAL at 21:38

## 2021-03-23 RX ADMIN — Medication 160 MCG: at 09:48

## 2021-03-23 RX ADMIN — SODIUM CHLORIDE, POTASSIUM CHLORIDE, SODIUM LACTATE AND CALCIUM CHLORIDE: 600; 310; 30; 20 INJECTION, SOLUTION INTRAVENOUS at 11:30

## 2021-03-23 RX ADMIN — DEXAMETHASONE SODIUM PHOSPHATE 4 MG: 4 INJECTION, SOLUTION INTRAMUSCULAR; INTRAVENOUS at 09:53

## 2021-03-23 RX ADMIN — OXYCODONE HYDROCHLORIDE AND ACETAMINOPHEN 1 TABLET: 10; 325 TABLET ORAL at 15:30

## 2021-03-23 RX ADMIN — Medication 80 MCG: at 11:35

## 2021-03-23 RX ADMIN — Medication 80 MCG: at 11:19

## 2021-03-23 RX ADMIN — SODIUM CHLORIDE, POTASSIUM CHLORIDE, SODIUM LACTATE AND CALCIUM CHLORIDE 20 ML/HR: 600; 310; 30; 20 INJECTION, SOLUTION INTRAVENOUS at 09:07

## 2021-03-23 RX ADMIN — Medication 80 MCG: at 10:00

## 2021-03-23 RX ADMIN — POTASSIUM CHLORIDE AND SODIUM CHLORIDE 100 ML/HR: 900; 150 INJECTION, SOLUTION INTRAVENOUS at 21:38

## 2021-03-23 RX ADMIN — ISOSORBIDE MONONITRATE 60 MG: 60 TABLET, EXTENDED RELEASE ORAL at 17:43

## 2021-03-23 RX ADMIN — Medication 40 MCG: at 11:08

## 2021-03-23 RX ADMIN — PROPOFOL 150 MG: 10 INJECTION, EMULSION INTRAVENOUS at 09:34

## 2021-03-23 RX ADMIN — CARVEDILOL 12.5 MG: 6.25 TABLET, FILM COATED ORAL at 08:25

## 2021-03-23 RX ADMIN — OXYCODONE HYDROCHLORIDE AND ACETAMINOPHEN 1 TABLET: 10; 325 TABLET ORAL at 03:24

## 2021-03-23 RX ADMIN — Medication 80 MCG: at 11:12

## 2021-03-23 RX ADMIN — FENTANYL CITRATE 25 MCG: 50 INJECTION, SOLUTION INTRAMUSCULAR; INTRAVENOUS at 10:42

## 2021-03-23 RX ADMIN — PROPOFOL 50 MG: 10 INJECTION, EMULSION INTRAVENOUS at 09:37

## 2021-03-23 RX ADMIN — FENTANYL CITRATE 25 MCG: 50 INJECTION, SOLUTION INTRAMUSCULAR; INTRAVENOUS at 10:45

## 2021-03-23 RX ADMIN — CEFAZOLIN SODIUM 2 G: 10 INJECTION, POWDER, FOR SOLUTION INTRAVENOUS at 08:13

## 2021-03-23 RX ADMIN — Medication 160 MCG: at 09:53

## 2021-03-23 RX ADMIN — LIDOCAINE HYDROCHLORIDE 100 MG: 20 INJECTION, SOLUTION EPIDURAL; INFILTRATION; INTRACAUDAL; PERINEURAL at 09:34

## 2021-03-23 RX ADMIN — Medication 80 MCG: at 10:19

## 2021-03-23 RX ADMIN — SODIUM CHLORIDE, POTASSIUM CHLORIDE, SODIUM LACTATE AND CALCIUM CHLORIDE 100 ML/HR: 600; 310; 30; 20 INJECTION, SOLUTION INTRAVENOUS at 09:00

## 2021-03-23 RX ADMIN — HYDROMORPHONE HYDROCHLORIDE 0.2 MG: 1 INJECTION, SOLUTION INTRAMUSCULAR; INTRAVENOUS; SUBCUTANEOUS at 12:09

## 2021-03-23 RX ADMIN — ROCURONIUM BROMIDE 10 MG: 10 INJECTION INTRAVENOUS at 09:36

## 2021-03-23 RX ADMIN — Medication 80 MCG: at 10:11

## 2021-03-23 RX ADMIN — SUCCINYLCHOLINE CHLORIDE 140 MG: 20 INJECTION, SOLUTION INTRAMUSCULAR; INTRAVENOUS at 09:36

## 2021-03-23 RX ADMIN — GABAPENTIN 100 MG: 100 CAPSULE ORAL at 21:38

## 2021-03-23 RX ADMIN — POTASSIUM CHLORIDE AND SODIUM CHLORIDE 100 ML/HR: 900; 150 INJECTION, SOLUTION INTRAVENOUS at 03:54

## 2021-03-23 RX ADMIN — DOCUSATE SODIUM 100 MG: 100 CAPSULE ORAL at 21:38

## 2021-03-23 RX ADMIN — GLYCOPYRROLATE 0.3 MG: 0.2 INJECTION, SOLUTION INTRAMUSCULAR; INTRAVENOUS at 11:36

## 2021-03-23 RX ADMIN — FENTANYL CITRATE 25 MCG: 50 INJECTION, SOLUTION INTRAMUSCULAR; INTRAVENOUS at 12:18

## 2021-03-23 RX ADMIN — CEFAZOLIN SODIUM 2 G: 10 INJECTION, POWDER, FOR SOLUTION INTRAVENOUS at 16:19

## 2021-03-23 RX ADMIN — CLONIDINE HYDROCHLORIDE 0.1 MG: 0.1 TABLET ORAL at 15:30

## 2021-03-23 RX ADMIN — ROCURONIUM BROMIDE 30 MG: 10 INJECTION INTRAVENOUS at 09:53

## 2021-03-23 RX ADMIN — FENTANYL CITRATE 25 MCG: 50 INJECTION, SOLUTION INTRAMUSCULAR; INTRAVENOUS at 12:13

## 2021-03-23 RX ADMIN — Medication 80 MCG: at 10:06

## 2021-03-23 RX ADMIN — FENTANYL CITRATE 25 MCG: 50 INJECTION, SOLUTION INTRAMUSCULAR; INTRAVENOUS at 11:38

## 2021-03-23 NOTE — ANESTHESIA POSTPROCEDURE EVALUATION
Patient: Tawny Shin    Procedure Summary     Date: 03/23/21 Room / Location:  [facility] OR  /  [facility]    Anesthesia Start: 0932 Anesthesia Stop: 1148    Procedure: LUMBAR DISCECTOMY MICRO, Lumbar 1/2 right (Right Spine Lumbar) Diagnosis:       Discitis, unspecified, lumbar region      Spinal stenosis, lumbar region, with neurogenic claudication      Discitis of lumbar region      Osteomyelitis of lumbar spine (CMS/HCC)      (Discitis, unspecified, lumbar region [M46.46])      (Spinal stenosis, lumbar region, with neurogenic claudication [M48.062])      (Discitis of lumbar region [M46.46])      (Osteomyelitis of lumbar spine (CMS/HCC) [M46.26])    Surgeons: Bandar Shea MD Provider: Rachele Aleman CRNA    Anesthesia Type: general ASA Status: 3          Anesthesia Type: general    Vitals  Vitals Value Taken Time   /78 03/23/21 1245   Temp 98.3 °F (36.8 °C) 03/23/21 1245   Pulse 74 03/23/21 1250   Resp 14 03/23/21 1245   SpO2 98 % 03/23/21 1250   Vitals shown include unvalidated device data.        Post Anesthesia Care and Evaluation    Patient location during evaluation: PACU  Patient participation: complete - patient participated  Level of consciousness: awake and alert  Pain management: adequate  Airway patency: patent  Anesthetic complications: No anesthetic complications    Cardiovascular status: acceptable  Respiratory status: acceptable  Hydration status: acceptable    Comments: Blood pressure (!) 188/78, pulse 75, temperature 99.9 °F (37.7 °C), temperature source Oral, resp. rate 16, SpO2 97 %, not currently breastfeeding.    Pt discharged from PACU based on tyler score >8

## 2021-03-23 NOTE — PLAN OF CARE
Problem: Adult Inpatient Plan of Care  Goal: Plan of Care Review  Outcome: Ongoing, Progressing  Flowsheets (Taken 3/23/2021 0444)  Progress: no change  Plan of Care Reviewed With: patient  Outcome Summary: A&Ox4, increased BP that was relieved by PRN labetalol. C/O pain that was relieved by PRN oral pain medication. Wound vac reinforced with Tegaderm, due to a suction leak. SCDs for VTE prevention. NPO since midnight for possible surgery. Safety maintained.

## 2021-03-23 NOTE — CONSULTS
"      [facility] Medicine Consult  Consults    Date of Admission: 3/22/2021  Date of Consult: 03/22/21    Primary Care Physician: Davon Urrutia MD  Referring Physician: Shabbir  Chief Complaint/Reason for Consultation: medical management, presurgical clearance    Subjective   History of Present Illness    Mrs. Shin is a 64 yo F well-known to me from recent admission related to wound infection and staph bacteremia.  Patient was transitioned to the [facility] for IV antibiotics.  Patient subsequently has had a cervical neck surgery and ileopsoas abscess drainage.  Patient has continued on appropriate antibiotics per ID.  Patient has continued to improve and subsequently was transferred back to [facility] for a planned discectomy with Dr. Shea to aid in the lower extremity weakness.     Dr. Shea has consulted us for pre-surgical evaluation.    Patient has a history of CAD and recent infection.  No history of CVA, insulin dependence, CHF.      Revised Cardiac Risk Index for Pre-Operative Risk - MDCalc  Calculated on Mar 22 2021 9:49 PM  1 points -> Class II Risk  6.0 % -> 30-day risk of death, MI, or cardiac arrest From Duceppe 2017, based on pooled data from 5 high quality external validations (4 prospective). These numbers are higher than those often quoted from the now-outdated original study (Carrillo 1999). See Evidence for details.      From [facility] Discharge summary by Francie Golden APRN:  \"Tawny Shin is a  65 y.o.  female who presents with neck pain s/p cervical decompression. The patient had been in her usual state of health when she developed a torn right gluteal muscle. She underwent surgical repair on 1/14/21and post-operative course was progressing. At her 4 week follow up, she developed increased bloody drainage from the wound with erythema. She presented for injection due to RA at which time she suffered a syncopal event. This was felt to be a vasovagal " response and she returned to home. She was found later by a family member in the floor for an unknown length of time and the dressing to the right hip/gluteal wound was saturated.  She presented to the hospital and was noted to be febrile on intake. Workup revealed an elevated CPK and d dimer. Wound cultures were obtained. The patient was admitted to the service of the hospitalists at that time. She underwent pacemaker interrogation in workup for her syncopal episodes which detected no abnormalities. MRI brain negative for acute process. Purulent drainage from the wound was cultured and the patient was started on broad spectrum antibiotics. She was seen in consultation by orthopedics. Blood cultures returned positive for MSSA in all bottles. ID was consulted for antibiotic management at that time. She underwent debridement of the wound per ortho on 2/9. Post-operatively, the wound measures 20 cm x 6 cm and wound vac therapy was initiated. Surveillance cultures remained positive and surgical wound cultures positive for e. Coli. She continued with antibiotic therapy with Azactam under direction of ID. Due to persistent bacteremia, she underwent ESTEFANI which did not reveal any valvular vegetations. Due to her need for continued IV antibiotic therapy, wound care and rehabilitation, she transferred to the [facility]. She was seen in consultation by our wound care team and was followed by ID. She developed increased complaints of back pain and imaging revealed a right psoas abscess as well as possible L1-2 osteomyelitis discitis. Inflammatory markers were also elevated. The patient had also complained of increased weakness and incoordination to bilateral upper extremities. Neurosurgery was consulted and recommended cervical decompression as well as continued antibiotic therapy with plans for extension of lumbar fusion. She transferred to [facility] on 3/3 and underwent cervical corpectomy and decompression. She tolerated the procedure  "without difficulty and returned to our facility.   The patient was initially progressing well with therapy upon her return, but she developed increased right hip pain impeding further progress. Multiple regimen adjustments were undertaken without relief. She continued on antibiotic treatment under the direction of ID and continued wound care under the direction of our wound care team. She developed increased edema to BLE requiring gentle diuresis. Orthopedic surgery was consulted to evaluate the hip who felt it was more likely related to the persistent psoas abscess noted on imaging. Originally, the patient was scheduled for repeat drainage of the abscess on 3/19, but she was going to require sedation and had eaten breakfast. The procedure was then rescheduled for today. Arrangements had been made for possible transfer to [facility] prior to her declining progress with therapy. Neurosurgery is now planning L1-L2 discectomy given ongoing discitis osteomyelitis in that region that has thus far not responded to antibiotic therapy. Following I and D of psoas abscess, the patient will discharge from our facility for admission to [facility] for planned neurosurgical procedure. \"      Review of Systems   Otherwise complete ROS is negative except as mentioned above.    Past Medical History:   Past Medical History:   Diagnosis Date   • Arthritis    • Asthma    • Chronic sinusitis    • Coronary artery disease    • Eustachian tube dysfunction    • Heart disease    • Herpes simplex    • Hyperlipidemia    • Hypertension    • Knee dislocation    • Labral tear of right hip joint    • Laryngitis sicca    • Laryngitis, chronic    • MI (myocardial infarction) (CMS/HCC)    • Otorrhea    • Sensorineural hearing loss    • Sjogren's disease (CMS/HCC)      Past Surgical History:  Past Surgical History:   Procedure Laterality Date   • A-V CARDIAC PACEMAKER INSERTION  2016   • ATRIAL CARDIAC PACEMAKER INSERTION     • CARDIAC CATHETERIZATION   "   • CATARACT EXTRACTION     • CERVICAL CORPECTOMY N/A 3/3/2021    Procedure: CERVICAL 6 CORPECTOMY WITH TITANIUM CAGE WITH NEURO MONITORING;  Surgeon: Bandar Shea MD;  Location:  [facility] OR;  Service: Neurosurgery;  Laterality: N/A;   • COLONOSCOPY  11/08/2011    One fold in the ascending colon which showed ulcer otherwise normal exam   • COLONOSCOPY  11/12/2004    Normal exam repeat in five years   • CORONARY ANGIOPLASTY WITH STENT PLACEMENT      X 2; 2013 & 2014   • ENDOSCOPY  07/10/2014    Normal exam   • HIP ABDUCTION TENOTOMY BILATERAL Right 1/14/2021    Procedure: RIGHT HIP GLUTEUS MEDLUS / MINIMUS REPAIR, POSSIBLE ACHILLES ALLOGRAFT;  Surgeon: Nino Carlson MD;  Location:  [facility] OR;  Service: Orthopedics;  Laterality: Right;   • INCISION AND DRAINAGE HIP Right 2/9/2021    Procedure: HIP INCISION AND DRAINAGE;  Surgeon: Nino Carlson MD;  Location:  [facility] OR;  Service: Orthopedics;  Laterality: Right;   • JOINT REPLACEMENT     • LUMBAR FUSION N/A 1/19/2018    Procedure: L3-4,L4-5 DECOMPRESSION, POSTERIOR SPINAL FUSION WITH INSTRUMENTATION;  Surgeon: Fortino Oropeza MD;  Location:  [facility] OR;  Service:    • LUMBAR LAMINECTOMY WITH FUSION Left 1/17/2018    Procedure: LEFT L3-4 L4-5 LATERAL LUMBAR INTERBODY FUSION;  Surgeon: Fortino Oropeza MD;  Location:  [facility] OR;  Service:    • MYRINGOTOMY W/ TUBES  09/04/2014    TUBES NO LONGER IN PLACE   • OTHER SURGICAL HISTORY      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 so hardware was removed and spacers were placed   • REPLACEMENT TOTAL KNEE Right      Social History:  reports that she has never smoked. She has never used smokeless tobacco. She reports that she does not drink alcohol and does not use drugs.    Family History: family history includes Cancer in her mother; Heart disease in her father.     Allergies:   Allergies   Allergen Reactions   • Atorvastatin Other (See Comments)     LEG CRAMPS     • Amoxicillin Rash   • Escitalopram Rash   • Nabumetone Rash    • Niacin Er Rash   • Penicillins Rash   • Simvastatin Rash     Medications: Scheduled Meds:carvedilol, 12.5 mg, Oral, BID With Meals  ceFAZolin, 2 g, Intravenous, Q8H  docusate sodium, 100 mg, Oral, BID  [START ON 3/23/2021] furosemide, 40 mg, Oral, Daily  gabapentin, 100 mg, Oral, Q12H  guaiFENesin, 600 mg, Oral, Q12H  [START ON 3/23/2021] heparin (porcine), 5,000 Units, Subcutaneous, Q12H  isosorbide mononitrate, 60 mg, Oral, BID  [START ON 3/23/2021] levothyroxine, 75 mcg, Oral, Q AM  [START ON 3/23/2021] pantoprazole, 40 mg, Oral, Q AM  [START ON 3/23/2021] polyethylene glycol, 17 g, Oral, Daily  [START ON 3/23/2021] predniSONE, 2 mg, Oral, Daily  saccharomyces boulardii, 250 mg, Oral, BID  senna-docusate sodium, 2 tablet, Oral, BID  sodium chloride, 10 mL, Intravenous, Q12H      Continuous Infusions:sodium chloride 0.9 % with KCl 20 mEq, 100 mL/hr, Last Rate: 100 mL/hr (03/22/21 1606)      PRN Meds:.•  acetaminophen **OR** acetaminophen **OR** acetaminophen  •  bisacodyl  •  budesonide  •  cloNIDine  •  labetalol  •  naloxone  •  nitroglycerin  •  ondansetron  •  oxyCODONE-acetaminophen  •  oxyCODONE-acetaminophen  •  sodium chloride    Objective   Objective    Physical Exam:   Temp:  [98 °F (36.7 °C)-98.1 °F (36.7 °C)] 98 °F (36.7 °C)  Heart Rate:  [77-86] 86  Resp:  [18] 18  BP: (145-169)/(62-65) 169/62  Physical Exam  Vitals and nursing note reviewed.   Constitutional:       General: She is not in acute distress.     Appearance: She is not diaphoretic.   HENT:      Head: Normocephalic and atraumatic.      Nose: No congestion or rhinorrhea.      Mouth/Throat:      Mouth: Mucous membranes are dry.      Pharynx: Oropharynx is clear.   Eyes:      General: No scleral icterus.     Conjunctiva/sclera: Conjunctivae normal.   Neck:      Comments: C-collar in place with anterior neck wound   Cardiovascular:      Rate and Rhythm: Normal rate and regular rhythm.      Heart sounds: No murmur heard.     Pulmonary:       Effort: Pulmonary effort is normal. No respiratory distress.      Breath sounds: Normal breath sounds. No stridor. No wheezing or rhonchi.   Abdominal:      General: Abdomen is flat. Bowel sounds are normal. There is no distension.      Palpations: Abdomen is soft. There is no mass.      Tenderness: There is no abdominal tenderness.      Hernia: No hernia is present.   Musculoskeletal:         General: Swelling present.   Skin:     General: Skin is warm and dry.      Comments: Right leg wound vac   Neurological:      Mental Status: She is oriented to person, place, and time.      Cranial Nerves: No cranial nerve deficit.      Motor: Weakness present.      Comments: RLE weakness; improved upper extremity strength/coordination from when I saw her last   Psychiatric:         Mood and Affect: Mood normal.         Behavior: Behavior normal.           Results Reviewed:  I have personally reviewed current lab, radiology, and data and agree with results.  Lab Results (last 24 hours)     Procedure Component Value Units Date/Time    Comprehensive Metabolic Panel [[id number]]  (Abnormal) Collected: 03/22/21 1516    Specimen: Blood Updated: 03/22/21 1554     Glucose 113 mg/dL      BUN 7 mg/dL      Creatinine 0.38 mg/dL      Sodium 138 mmol/L      Potassium 4.1 mmol/L      Comment: Slight hemolysis detected by analyzer. Results may be affected.        Chloride 98 mmol/L      CO2 30.0 mmol/L      Calcium 8.8 mg/dL      Total Protein 6.5 g/dL      Albumin 2.60 g/dL      ALT (SGPT) <5 U/L      AST (SGOT) 16 U/L      Alkaline Phosphatase 98 U/L      Total Bilirubin 0.5 mg/dL      eGFR Non African Amer >150 mL/min/1.73      Globulin 3.9 gm/dL      A/G Ratio 0.7 g/dL      BUN/Creatinine Ratio 18.4     Anion Gap 10.0 mmol/L     Narrative:      GFR Normal >60  Chronic Kidney Disease <60  Kidney Failure <15      C-reactive Protein [[id number]]  (Abnormal) Collected: 03/22/21 1516    Specimen: Blood Updated: 03/22/21 1554     C-Reactive  Protein 8.99 mg/dL     aPTT [[id number]]  (Abnormal) Collected: 03/22/21 1516    Specimen: Blood Updated: 03/22/21 1545     PTT 40.5 seconds     Protime-INR [[id number]]  (Abnormal) Collected: 03/22/21 1516    Specimen: Blood Updated: 03/22/21 1545     Protime 16.2 Seconds      INR 1.34    Sedimentation Rate [[id number]]  (Abnormal) Collected: 03/22/21 1516    Specimen: Blood Updated: 03/22/21 1538     Sed Rate 35 mm/hr     CBC Auto Differential [[id number]]  (Abnormal) Collected: 03/22/21 1516    Specimen: Blood Updated: 03/22/21 1533     WBC 6.02 10*3/mm3      RBC 3.67 10*6/mm3      Hemoglobin 10.3 g/dL      Hematocrit 32.9 %      MCV 89.6 fL      MCH 28.1 pg      MCHC 31.3 g/dL      RDW 16.6 %      RDW-SD 53.9 fl      MPV 9.6 fL      Platelets 230 10*3/mm3      Neutrophil % 66.3 %      Lymphocyte % 17.4 %      Monocyte % 11.8 %      Eosinophil % 3.2 %      Basophil % 0.8 %      Immature Grans % 0.5 %      Neutrophils, Absolute 3.99 10*3/mm3      Lymphocytes, Absolute 1.05 10*3/mm3      Monocytes, Absolute 0.71 10*3/mm3      Eosinophils, Absolute 0.19 10*3/mm3      Basophils, Absolute 0.05 10*3/mm3      Immature Grans, Absolute 0.03 10*3/mm3      nRBC 0.0 /100 WBC         Imaging Results (Last 24 Hours)     Procedure Component Value Units Date/Time    XR Chest 1 View [[id number]] Collected: 03/22/21 1609     Updated: 03/22/21 1613    Narrative:      EXAMINATION: XR CHEST 1 VW-     3/22/2021 4:09 PM CDT     HISTORY: Preop assessment. Epidural abscess.     One view chest x-ray compared with 02/08/2021.     Left cardiac pacer.  Heart size is normal.  The mediastinum is within normal limits.      The lungs are normally expanded with no pneumonia or pneumothorax.  Mild chronic appearing interstitial disease with a few calcified  granulomas.  No congestive failure changes.                                                                       Impression:      1. No acute disease.           This report was finalized on  "03/22/2021 16:10 by Dr. Jesus Manuel Santos MD.          Assessment / Plan   Assessment:     Active Hospital Problems    Diagnosis    • Discitis, unspecified, lumbar region    • Iliopsoas abscess (CMS/HCC)    • Stenosis of cervical spine with myelopathy (CMS/HCC)      Added automatically from request for surgery [id number]     • Spinal stenosis, lumbar region, with neurogenic claudication      Added automatically from request for surgery [id number]     • Osteomyelitis of lumbar spine (CMS/HCC)      Added automatically from request for surgery [id number]     • Discitis of lumbar region      Added automatically from request for surgery [id number]     • Staphylococcus aureus bacteremia    • Obesity (BMI 30-39.9)    • Cardiac pacemaker syndrome      Overview:   - heart block  - implanted 11/16     • Seropositive rheumatoid arthritis of multiple sites (CMS/HCC)      Overview:   -myochrysine '93-'96  -methotrexate '96--->11/98;r/s  restarted 2/99--> 8/14 (anemia)  -sulfasalazine- not effective  -penicillamine 6/98-->10/98; no effect  -leflunomide 11/98-->  - Humira '13-->didn't take  - Enbrel 12/14-->3/15- no effect!      Last Assessment & Plan:   - \"aching all over\" because she had to be off her anti-rheumatic drugs for 2 weeks in preparation for her R knee surgery  - her R knee surgery got cancelled because she developed cellulitis of her L arm (with fever). She was started on Bactrim, and then transitioned to doxycyline on Aug 1 2018 (3 week course) and her R knee surgery was postponed to 1 month from now.   - As such, she restarted her anti-rheumatics on Aug 1 2018.  - She will have to hold her anti-rheumatics again 2 weeks before her surgery  - She feels her medications are otherwise working well. No changes to be made to her current regimen, which is Humira injection q2 week, leflunomide (Arava), and salicylate     - Pt is concerned that when she transitions insurance in December of this year, she will lose her salicylate. We had a " discussion and expressed that it would be best for her to stay on salicylate because we do not want the blood thinning effects of aspirin. Dr. Walden will plan to write a prior authorization for her for salicylate.     • Coronary artery disease      PCI RCA '10, NSTEMI requiring LAD PCI in '13    [facility] 9/3/19 at [facility]: no significant dz; stents patent     • S/P coronary artery stent placement          Plan:   1.  Continue antibiotics per ID  2.  RCRI 1, Class II risk (6.0% 30-day risk of MACE).  Okay to proceed with surgery from my standpoint.  Patient understands risks and benefits of surgery.    3.  ECG ordered by neurosurgery this afternoon, was still not completed this evening have called charge nurse and requested this to be completed  4.  PT/OT following surgery for assistance with disposition  5.  Recommend patient receive her beta blocker in AM   6.  Incentive spirometer  7.  Pain control per neurosurgery   8.  Bowel regimen     I discussed the patient's findings and my recommendations with patient, Dr. Shea.    Patient seen and examined by me on 3/22/2021 at 1630.    Electronically signed by Moises Oliveira MD, 03/22/21, 20:30 CDT.

## 2021-03-23 NOTE — CONSULTS
[facility] Medicine Services  INPATIENT PROGRESS NOTE    Patient Name: Tawny Shin  Date of Admission: 3/22/2021  Today's Date: 03/23/21  Length of Stay: 1  Primary Care Physician: Davon Urrutia MD    Subjective   Chief Complaint: back pain   HPI     Patient seen and examined at bedside.  Patient sleeping during most of the encounter.  Discussed with the patients family at bedside.  Pain has been bad today.  They have already got her up and walked, did not do well but tolerated.        Review of Systems   Constitutional: Negative for chills and fever.   Respiratory: Negative for cough and shortness of breath.    Cardiovascular: Negative for chest pain and palpitations.   Gastrointestinal: Negative for abdominal distention, abdominal pain, constipation, diarrhea, nausea and vomiting.   Musculoskeletal: Positive for arthralgias, back pain and myalgias.        All pertinent negatives and positives are as above. All other systems have been reviewed and are negative unless otherwise stated.     Objective    Temp:  [97.7 °F (36.5 °C)-99.9 °F (37.7 °C)] 97.7 °F (36.5 °C)  Heart Rate:  [72-90] 72  Resp:  [14-18] 16  BP: (126-188)/(62-94) 167/71  Physical Exam  Vitals and nursing note reviewed.   Constitutional:       Appearance: She is obese.   HENT:      Head: Normocephalic and atraumatic.      Nose: No congestion or rhinorrhea.      Mouth/Throat:      Pharynx: No oropharyngeal exudate or posterior oropharyngeal erythema.   Eyes:      General: No scleral icterus.  Cardiovascular:      Rate and Rhythm: Normal rate and regular rhythm.      Heart sounds: No murmur heard.     Pulmonary:      Effort: Pulmonary effort is normal. No respiratory distress.      Breath sounds: Normal breath sounds. No stridor. No wheezing or rhonchi.   Abdominal:      General: Abdomen is flat. Bowel sounds are normal. There is no distension.      Palpations: Abdomen is soft. There is no mass.       Tenderness: There is no abdominal tenderness.      Hernia: No hernia is present.   Skin:     General: Skin is warm and dry.      Coloration: Skin is not jaundiced or pale.   Neurological:      General: No focal deficit present.      Mental Status: She is alert and oriented to person, place, and time.   Psychiatric:         Mood and Affect: Mood normal.         Behavior: Behavior normal.             Results Review:  I have reviewed the labs, radiology results, and diagnostic studies.    Laboratory Data:   Results from last 7 days   Lab Units 03/23/21  0420 03/22/21  1516 03/19/21  0418   WBC 10*3/mm3 5.41 6.02 5.85   HEMOGLOBIN g/dL 9.2* 10.3* 9.7*   HEMATOCRIT % 29.4* 32.9* 31.1*   PLATELETS 10*3/mm3 210 230 260        Results from last 7 days   Lab Units 03/23/21  0420 03/22/21  1516 03/19/21  0418   SODIUM mmol/L 137 138 138   POTASSIUM mmol/L 4.0 4.1 3.8   CHLORIDE mmol/L 100 98 98   CO2 mmol/L 27.0 30.0* 32.0*   BUN mg/dL 7* 7* 9   CREATININE mg/dL 0.35* 0.38* 0.43*   CALCIUM mg/dL 7.9* 8.8 8.8   BILIRUBIN mg/dL 0.4 0.5 0.4   ALK PHOS U/L 88 98 103   ALT (SGPT) U/L <5 <5 <5   AST (SGOT) U/L 17 16 15   GLUCOSE mg/dL 78 113* 79       Culture Data:   No results found for: BLOODCX, URINECX, WOUNDCX, MRSACX, RESPCX, STOOLCX    Radiology Data:   Imaging Results (Last 24 Hours)     Procedure Component Value Units Date/Time    XR Spine Lumbar AP & Lateral [[id number]] Collected: 03/23/21 1239     Updated: 03/23/21 1245    Narrative:      EXAMINATION: XR SPINE LUMBAR AP AND LATERAL- 3/23/2021 12:39 PM CDT     HISTORY: Lumbar surgery     FLUOROSCOPY TIME:  15 seconds     AIR KERMA: 12.2mGy     NUMBER OF IMAGES: 2     FINDINGS:  Multiple fluoroscopic images are submitted from the OR. A radiologist  was not present for the acquisition or intraoperative interpretation of  these images. Images demonstrate a coned-down view of the lumbar spine.  Hardware is evident in the inferior lumbar spine. Instrumentation  projects over  the posterior soft tissues. Please see the operative  report for details pertaining to this exam.  This report was finalized on 03/23/2021 12:40 by Dr. Dillon Flores MD.    FL C Arm During Surgery [[id number]] Collected: 03/23/21 1239     Updated: 03/23/21 1245    Narrative:      EXAMINATION: XR SPINE LUMBAR AP AND LATERAL- 3/23/2021 12:39 PM CDT     HISTORY: Lumbar surgery     FLUOROSCOPY TIME:  15 seconds     AIR KERMA: 12.2mGy     NUMBER OF IMAGES: 2     FINDINGS:  Multiple fluoroscopic images are submitted from the OR. A radiologist  was not present for the acquisition or intraoperative interpretation of  these images. Images demonstrate a coned-down view of the lumbar spine.  Hardware is evident in the inferior lumbar spine. Instrumentation  projects over the posterior soft tissues. Please see the operative  report for details pertaining to this exam.  This report was finalized on 03/23/2021 12:40 by Dr. Dillon Flores MD.    XR Spine Cervical 2 View [[id number]] Collected: 03/23/21 0751     Updated: 03/23/21 0756    Narrative:      EXAMINATION: XR SPINE CERVICAL 2 VW- 3/23/2021 7:51 AM CDT     HISTORY: Cervical spinal fusion     COMPARISON: 3/4/2021     FINDINGS:  C2-C6 are visible on the lateral view. There has been prior corpectomy  at C6 with anterior fusion at C5 and C7. There is no appreciable  hardware fracture. There is straightening of the normal cervical  lordosis. Alignment of the cervical spine appears normal. Vertebral body  heights appear well maintained. The surgical drain has been removed.  There is mild residual prevertebral edema. There is no evidence of acute  fracture.       Impression:      Status post C6 corpectomy with anterior fusion at C5-C7.  Interval removal of surgical drain. Improved prevertebral edema.  This report was finalized on 03/23/2021 07:53 by Dr. Dillon Flores MD.          I have reviewed the patient's current medications.     Assessment/Plan     Active Hospital Problems  "   Diagnosis    • Discitis, unspecified, lumbar region    • Iliopsoas abscess (CMS/[facility]ly [facility])    • Stenosis of cervical spine with myelopathy (CMS/[facility])      Added automatically from request for surgery [id number]     • Spinal stenosis, lumbar region, with neurogenic claudication      Added automatically from request for surgery [id number]     • Osteomyelitis of lumbar spine (CMS/[facility])      Added automatically from request for surgery [id number]     • Discitis of lumbar region      Added automatically from request for surgery [id number]     • Staphylococcus aureus bacteremia    • Obesity (BMI 30-39.9)    • Cardiac pacemaker syndrome      Overview:   - heart block  - implanted 11/16     • Seropositive rheumatoid arthritis of multiple sites (CMS/[facility])      Overview:   -myochrysine '93-'96  -methotrexate '96--->11/98;r/s  restarted 2/99--> 8/14 (anemia)  -sulfasalazine- not effective  -penicillamine 6/98-->10/98; no effect  -leflunomide 11/98-->  - Humira '13-->didn't take  - Enbrel 12/14-->3/15- no effect!      Last Assessment & Plan:   - \"aching all over\" because she had to be off her anti-rheumatic drugs for 2 weeks in preparation for her R knee surgery  - her R knee surgery got cancelled because she developed cellulitis of her L arm (with fever). She was started on Bactrim, and then transitioned to doxycyline on Aug 1 2018 (3 week course) and her R knee surgery was postponed to 1 month from now.   - As such, she restarted her anti-rheumatics on Aug 1 2018.  - She will have to hold her anti-rheumatics again 2 weeks before her surgery  - She feels her medications are otherwise working well. No changes to be made to her current regimen, which is Humira injection q2 week, leflunomide (Arava), and salicylate     - Pt is concerned that when she transitions insurance in December of this year, she will lose her salicylate. We had a discussion and expressed that it would be best for her to stay on salicylate because we do not want the blood " thinning effects of aspirin. Dr. Walden will plan to write a prior authorization for her for salicylate.     • Coronary artery disease      PCI RCA '10, NSTEMI requiring LAD PCI in '13    [facility] 9/3/19 at [facility]: no significant dz; stents patent     • S/P coronary artery stent placement        Plan:   1.  Continue antibiotics per ID  2.  Tolerated procedure.  GUILLERMO in place.  Wound culture pending.  Concern for abscess noted during procedure.     3.  ECG reviewed.  Normal sinus rhythm; normal rate; left axis deviation; no significant ST changes  4.  PT/OT following surgery for assistance with disposition  5.  Resume home medications as appropriate  6.  Incentive spirometer  7.  Pain control per neurosurgery   8.  Bowel regimen               Discharge Planning: I expect the patient to be discharged per attending in 1-2 days.    Electronically signed by Moises Oliveira MD, 03/23/21, 18:08 CDT.

## 2021-03-23 NOTE — PROGRESS NOTES
"Pharmacy Dosing Service  Pharmacokinetics  Vancomycin Initial Evaluation  Assessment/Action/Plan:  Loading dose?: 1500 mg given 0905 3/23, 1000 mg given 1114 3/23   Current Order: Vancomycin 750 mg IVPB every 12 hours  Current end date:4/2/21  Levels: Ordered, due 2000 3/24/21  Additional antimicrobial agent(s): Ancef    Vancomycin dosage initiated based on population pharmacokinetic parameters. Pharmacy will continue to follow daily and adjust dose accordingly.  Consult placed for Pharmacy to Dose Vancomycin for Bone and/or Joint Infection, surgical prophylaxis.  Patient received 1500 mg at 0905 and 1000 mg at 1114 in surgery.  Initiated Vancomycin 750 mg iv q 12hrs next dose due 2100 3/23/21.     Subjective:  Tawny Shin is a 65 y.o. female with a Vancomycin \"Pharmacy to Dose\" consult for the treatment of Bone and/or Joint Infection/Surg PPX , day 1 of 10 of treatment.    AUC Model Data:  Loading dose: 1500 mg at 0905 3/23/21, 1000 mg at 1114 3/23/21  Regimen: 750 mg every 12 hours  Exposure target: AUC24 (range)400-600 mg/L.hr  AUC24,ss: 428 mg/L.hr  PAUC*: 57 %  Ctrough,ss: 14.6 mg/L  Pconc*: 23 %  Tox.: 10 %      Objective:  Ht:  ; Wt:    Estimated Creatinine Clearance: 81.7 mL/min (A) (by C-G formula based on SCr of 0.35 mg/dL (L)).   Creatinine   Date Value Ref Range Status   03/23/2021 0.35 (L) 0.57 - 1.00 mg/dL Final   03/22/2021 0.38 (L) 0.57 - 1.00 mg/dL Final   03/19/2021 0.43 (L) 0.57 - 1.00 mg/dL Final   07/08/2020 0.90 0.60 - 1.30 mg/dL Final     Comment:     Serial Number: [id number]:  [id number]   09/03/2019 0.7 0.5 - 0.9 mg/dL Final   01/14/2019 0.5 0.5 - 0.9 mg/dL Final   01/07/2019 0.6 0.5 - 0.9 mg/dL Final      Lab Results   Component Value Date    WBC 5.41 03/23/2021    WBC 6.02 03/22/2021    WBC 5.85 03/19/2021      Baseline culture results:  Microbiology Results (last 10 days)       Procedure Component Value - Date/Time    Body Fluid Culture - Body Fluid, Spine, Lumbar [[id number]] " Collected: 03/22/21 1708    Lab Status: Preliminary result Specimen: Body Fluid from Spine, Lumbar Updated: 03/23/21 1241     Body Fluid Culture Growth present, too young to evaluate     Gram Stain Many (4+) WBCs seen      Few (2+) Gram positive cocci    COVID-19, [facility] IN-HOUSE,NASAL Swab (NO TRANSPORT MEDIA) 2 HR TAT - Swab, Nasopharynx [[id number]]  (Normal) Collected: 03/22/21 0549    Lab Status: Final result Specimen: Swab from Nasopharynx Updated: 03/22/21 0639     COVID19 Presumptive Negative    Narrative:      Fact sheet for providers: https://www.fda.gov/media/315674/download     Fact sheet for patients: https://www.fda.gov/media/127348/download    Test performed by PCR.  If inconsistent with clinical signs and symptoms patient should be tested with different authorized molecular test.    COVID-19, [facility]OTT IN-HOUSE,NASAL Swab (NO TRANSPORT MEDIA) 2 HR TAT - Swab, Nasopharynx [[id number]]  (Normal) Collected: 03/19/21 0919    Lab Status: Final result Specimen: Swab from Nasopharynx Updated: 03/19/21 1003     COVID19 Presumptive Negative    Narrative:      Fact sheet for providers: https://www.fda.gov/media/021546/download     Fact sheet for patients: https://www.fda.gov/media/756803/download    Test performed by PCR.  If inconsistent with clinical signs and symptoms patient should be tested with different authorized molecular test.            KARUNA Bee RPH  03/23/21 13:24 CDT

## 2021-03-23 NOTE — PROGRESS NOTES
NEUROSURGERY DAILY PROGRESS NOTE    ASSESSMENT:   Tawny Shin is a 65 y.o. with a significant comorbidity rheumatoid arthritis on disease modifying agents, prior lumbar fusion by Dr. Oropeza in 2018.  She presents with a new problem of postop surgical infection of her right hip with wound VAC dressing, progressive right lower extremity proximal weakness without numbness or radiculopathy. Physical exam findings of right iliopsoas weakness, and global hyperreflexia with Babinski and right Patience's and decreased  strength bilaterally.  Their imaging shows CT of the cervical spine with severe cervical stenosis at severe stenosis C5-6 and C6/7 and adjacent segment disease at L1/2 and L2/3 with concern for osteomyelitis discitis.    Past Medical History:   Diagnosis Date   • Arthritis    • Asthma    • Chronic sinusitis    • Coronary artery disease    • Eustachian tube dysfunction    • Heart disease    • Herpes simplex    • Hyperlipidemia    • Hypertension    • Knee dislocation    • Labral tear of right hip joint    • Laryngitis sicca    • Laryngitis, chronic    • MI (myocardial infarction) (CMS/HCC)    • Otorrhea    • Sensorineural hearing loss    • Sjogren's disease (CMS/HCC)      Active Hospital Problems    Diagnosis    • Discitis, unspecified, lumbar region    • Iliopsoas abscess (CMS/HCC)    • Stenosis of cervical spine with myelopathy (CMS/[facility])      Added automatically from request for surgery [id number]     • Spinal stenosis, lumbar region, with neurogenic claudication      Added automatically from request for surgery [id number]     • Osteomyelitis of lumbar spine (CMS/HCC)      Added automatically from request for surgery [id number]     • Discitis of lumbar region      Added automatically from request for surgery [id number]     • Staphylococcus aureus bacteremia    • Obesity (BMI 30-39.9)    • Cardiac pacemaker syndrome      Overview:   - heart block  - implanted 11/16     • Seropositive rheumatoid arthritis of multiple  "sites (CMS/[facility])      Overview:   -myochrysine '93-'96  -methotrexate '96--->11/98;r/s  restarted 2/99--> 8/14 (anemia)  -sulfasalazine- not effective  -penicillamine 6/98-->10/98; no effect  -leflunomide 11/98-->  - Humira '13-->didn't take  - Enbrel 12/14-->3/15- no effect!      Last Assessment & Plan:   - \"aching all over\" because she had to be off her anti-rheumatic drugs for 2 weeks in preparation for her R knee surgery  - her R knee surgery got cancelled because she developed cellulitis of her L arm (with fever). She was started on Bactrim, and then transitioned to doxycyline on Aug 1 2018 (3 week course) and her R knee surgery was postponed to 1 month from now.   - As such, she restarted her anti-rheumatics on Aug 1 2018.  - She will have to hold her anti-rheumatics again 2 weeks before her surgery  - She feels her medications are otherwise working well. No changes to be made to her current regimen, which is Humira injection q2 week, leflunomide (Arava), and salicylate     - Pt is concerned that when she transitions insurance in December of this year, she will lose her salicylate. We had a discussion and expressed that it would be best for her to stay on salicylate because we do not want the blood thinning effects of aspirin. Dr. Walden will plan to write a prior authorization for her for salicylate.     • Coronary artery disease      PCI RCA '10, NSTEMI requiring LAD PCI in '13    [facility] 9/3/19 at [facility]: no significant dz; stents patent     • S/P coronary artery stent placement      HPI: Appears to be resting comfortably.  Continues to complain of lumbar back and right hip pain that radiates to the right groin.  No acute events overnight.    PLAN:   Neuro: Stable.  Neuro unchanged, right IP weakness   Neurochecks every 4 hours     Concern for discitis/osteomyelitis L1/2  L1/2 severe central canal stenosis  Severe stenosis from adjacent segment disease   Plan for right L4-5 microdiscectomy today    Right psoas " abscess              I&D right psoas abscess performed per IR 3/2/2021 and 3/22/2021   Body fluid culture: 4+ WBCs, 2+ gram-positive cocci   Fungus culture: Pending   Anaerobic culture: Pending   ABF culture: Pending    Cervical stenosis                       POD #17 C6 corpectomy   Continue cervical collar   Intermittent difficulty with swallowing.   Incision: C, D, I    CV: No acute issues.  VS WNL  Pulm: No acute issues.  Maintaining O2 sat.  Continuous pulse oximetry  : No acute issues.  Voiding spontaneously  FEN: NPO since midnight  GI: No acute issues.  ORTHO: Open wound right posterior hip.  MSSA infection, wound vac per PT  ID: WBC normal.  Continue cefazolin per ID  Heme:  DVT prophylaxis held for operative procedure; SCDs   Preop H/H 9.2/29.4; platelets 210, CRP waxes and wanes  Pain: Tolerable at present  Dispo: PT/OT    CHIEF COMPLAINT:   Lumbar back/right hip and anterior right thigh pain    Subjective  Symptom stable.  Doing well    Temp:  [98 °F (36.7 °C)-98.8 °F (37.1 °C)] 98.1 °F (36.7 °C)  Heart Rate:  [77-90] 85  Resp:  [16-18] 16  BP: (143-188)/(62-80) 143/77    Objective:  General Appearance:  Well-appearing and in no acute distress.    Vital signs: (most recent): Blood pressure 143/77, pulse 85, temperature 98.1 °F (36.7 °C), temperature source Oral, resp. rate 16, SpO2 94 %, not currently breastfeeding.        Neurologic Exam     Mental Status   Oriented to person, place, and time.   Attention: normal. Concentration: normal.   Speech: speech is normal   Level of consciousness: alert    Bright and awake.  Oriented x3.  Follows commands without prompting showing thumbs up into fingers bilaterally.     Cranial Nerves     CN II   Visual fields full to confrontation.     CN III, IV, VI   Pupils are equal, round, and reactive to light.  Extraocular motions are normal.     CN V   Facial sensation intact.     CN VII   Facial expression full, symmetric.     CN VIII   CN VIII normal.     CN IX, X    CN IX normal.     CN XI   CN XI normal.     Motor Exam     Strength   Right deltoid: 5/5  Left deltoid: 5/5  Right biceps: 5/5  Left biceps: 5/5  Right triceps: 5/5  Left triceps: 5/5  Right wrist extension: 5/5  Left wrist extension: 5/5  Right interossei: 4/5  Left interossei: 4/5  Right iliopsoas: 2/5  Left iliopsoas: 5/5  Right quadriceps: 2/5  Left quadriceps: 5/5  Right anterior tibial: 3/5  Left anterior tibial: 5/5  Right gastroc: 4/5  Left gastroc: 5/5       Sensory Exam   Sensory deficit distribution on right: L1    Gait, Coordination, and Reflexes     Tremor   Resting tremor: absent  Intention tremor: absent  Action tremor: absent    Reflexes   Right plantar: upgoing  Left plantar: normal  Right Mims: present  Left Mims: absent  Right ankle clonus: present  Left ankle clonus: present  Right pendular knee jerk: absent  Left pendular knee jerk: absent    Drains:   [REMOVED] Closed/Suction Drain 1 Anterior Neck Bulb 10 Fr. (Removed)   Site Description Unable to view 03/06/21 0815   Dressing Status Clean;Dry;Intact 03/06/21 0815   Drainage Appearance Serosanguineous 03/06/21 0815   Status To bulb suction 03/06/21 0815   Output (mL) 20 mL 03/05/21 1800       [REMOVED] Urethral Catheter Silicone 16 Fr. (Removed)   Daily Indications Required activity restriction from trauma, surgery, (e.g. unstable spine, fracture, hemodynamics) 03/04/21 0800   Site Assessment Clean;Skin intact 03/04/21 0800   Collection Container Standard drainage bag 03/04/21 0800   Securement Method Securing device 03/04/21 0800   Catheter care complete Yes 03/04/21 0800   Output (mL) 425 mL 03/04/21 0800       Imaging Results (Last 24 Hours)     Procedure Component Value Units Date/Time    XR Spine Cervical 2 View [[id number]] Collected: 03/23/21 0751     Updated: 03/23/21 0756    Narrative:      EXAMINATION: XR SPINE CERVICAL 2 VW- 3/23/2021 7:51 AM CDT     HISTORY: Cervical spinal fusion     COMPARISON: 3/4/2021     FINDINGS:  C2-C6  are visible on the lateral view. There has been prior corpectomy  at C6 with anterior fusion at C5 and C7. There is no appreciable  hardware fracture. There is straightening of the normal cervical  lordosis. Alignment of the cervical spine appears normal. Vertebral body  heights appear well maintained. The surgical drain has been removed.  There is mild residual prevertebral edema. There is no evidence of acute  fracture.       Impression:      Status post C6 corpectomy with anterior fusion at C5-C7.  Interval removal of surgical drain. Improved prevertebral edema.  This report was finalized on 03/23/2021 07:53 by Dr. Dillon Flores MD.    XR Chest 1 View [[id number]] Collected: 03/22/21 1609     Updated: 03/22/21 1613    Narrative:      EXAMINATION: XR CHEST 1 VW-     3/22/2021 4:09 PM CDT     HISTORY: Preop assessment. Epidural abscess.     One view chest x-ray compared with 02/08/2021.     Left cardiac pacer.  Heart size is normal.  The mediastinum is within normal limits.      The lungs are normally expanded with no pneumonia or pneumothorax.  Mild chronic appearing interstitial disease with a few calcified  granulomas.  No congestive failure changes.                                                                       Impression:      1. No acute disease.           This report was finalized on 03/22/2021 16:10 by Dr. Jesus Manuel Santos MD.        Lab Results (last 24 hours)     Procedure Component Value Units Date/Time    Comprehensive Metabolic Panel [[id number]]  (Abnormal) Collected: 03/23/21 0420    Specimen: Blood Updated: 03/23/21 0517     Glucose 78 mg/dL      BUN 7 mg/dL      Creatinine 0.35 mg/dL      Sodium 137 mmol/L      Potassium 4.0 mmol/L      Chloride 100 mmol/L      CO2 27.0 mmol/L      Calcium 7.9 mg/dL      Total Protein 5.6 g/dL      Albumin 2.30 g/dL      ALT (SGPT) <5 U/L      AST (SGOT) 17 U/L      Alkaline Phosphatase 88 U/L      Total Bilirubin 0.4 mg/dL      eGFR Non African Amer >150  mL/min/1.73      Globulin 3.3 gm/dL      A/G Ratio 0.7 g/dL      BUN/Creatinine Ratio 20.0     Anion Gap 10.0 mmol/L     Narrative:      GFR Normal >60  Chronic Kidney Disease <60  Kidney Failure <15      CBC Auto Differential [[id number]]  (Abnormal) Collected: 03/23/21 0420    Specimen: Blood Updated: 03/23/21 0452     WBC 5.41 10*3/mm3      RBC 3.28 10*6/mm3      Hemoglobin 9.2 g/dL      Hematocrit 29.4 %      MCV 89.6 fL      MCH 28.0 pg      MCHC 31.3 g/dL      RDW 16.5 %      RDW-SD 53.5 fl      MPV 9.6 fL      Platelets 210 10*3/mm3      Neutrophil % 56.0 %      Lymphocyte % 23.7 %      Monocyte % 15.0 %      Eosinophil % 3.9 %      Basophil % 0.7 %      Immature Grans % 0.7 %      Neutrophils, Absolute 3.03 10*3/mm3      Lymphocytes, Absolute 1.28 10*3/mm3      Monocytes, Absolute 0.81 10*3/mm3      Eosinophils, Absolute 0.21 10*3/mm3      Basophils, Absolute 0.04 10*3/mm3      Immature Grans, Absolute 0.04 10*3/mm3      nRBC 0.0 /100 WBC     Comprehensive Metabolic Panel [[id number]]  (Abnormal) Collected: 03/22/21 1516    Specimen: Blood Updated: 03/22/21 1554     Glucose 113 mg/dL      BUN 7 mg/dL      Creatinine 0.38 mg/dL      Sodium 138 mmol/L      Potassium 4.1 mmol/L      Comment: Slight hemolysis detected by analyzer. Results may be affected.        Chloride 98 mmol/L      CO2 30.0 mmol/L      Calcium 8.8 mg/dL      Total Protein 6.5 g/dL      Albumin 2.60 g/dL      ALT (SGPT) <5 U/L      AST (SGOT) 16 U/L      Alkaline Phosphatase 98 U/L      Total Bilirubin 0.5 mg/dL      eGFR Non African Amer >150 mL/min/1.73      Globulin 3.9 gm/dL      A/G Ratio 0.7 g/dL      BUN/Creatinine Ratio 18.4     Anion Gap 10.0 mmol/L     Narrative:      GFR Normal >60  Chronic Kidney Disease <60  Kidney Failure <15      C-reactive Protein [[id number]]  (Abnormal) Collected: 03/22/21 1516    Specimen: Blood Updated: 03/22/21 1554     C-Reactive Protein 8.99 mg/dL     aPTT [[id number]]  (Abnormal) Collected: 03/22/21 1299     Specimen: Blood Updated: 03/22/21 1545     PTT 40.5 seconds     Protime-INR [[id number]]  (Abnormal) Collected: 03/22/21 1516    Specimen: Blood Updated: 03/22/21 1545     Protime 16.2 Seconds      INR 1.34    Sedimentation Rate [[id number]]  (Abnormal) Collected: 03/22/21 1516    Specimen: Blood Updated: 03/22/21 1538     Sed Rate 35 mm/hr     CBC Auto Differential [[id number]]  (Abnormal) Collected: 03/22/21 1516    Specimen: Blood Updated: 03/22/21 1533     WBC 6.02 10*3/mm3      RBC 3.67 10*6/mm3      Hemoglobin 10.3 g/dL      Hematocrit 32.9 %      MCV 89.6 fL      MCH 28.1 pg      MCHC 31.3 g/dL      RDW 16.6 %      RDW-SD 53.9 fl      MPV 9.6 fL      Platelets 230 10*3/mm3      Neutrophil % 66.3 %      Lymphocyte % 17.4 %      Monocyte % 11.8 %      Eosinophil % 3.2 %      Basophil % 0.8 %      Immature Grans % 0.5 %      Neutrophils, Absolute 3.99 10*3/mm3      Lymphocytes, Absolute 1.05 10*3/mm3      Monocytes, Absolute 0.71 10*3/mm3      Eosinophils, Absolute 0.19 10*3/mm3      Basophils, Absolute 0.05 10*3/mm3      Immature Grans, Absolute 0.03 10*3/mm3      nRBC 0.0 /100 WBC         [id number]  Taiwo Prado, APRN

## 2021-03-23 NOTE — THERAPY EVALUATION
Patient Name: Tawny Shin  : 1955    MRN: [id number]                              Today's Date: 3/23/2021       Admit Date: 3/22/2021    Visit Dx:     Therapist utilized gait belt, applied non-slipped socks, provided fall risk education/prevention, & facilitated muscle strengthening PRN to reduce patient falls risk during this session.      ICD-10-CM ICD-9-CM   1. Discitis, unspecified, lumbar region  M46.46 722.93   2. Spinal stenosis, lumbar region, with neurogenic claudication  M48.062 724.03   3. Discitis of lumbar region  M46.46 722.93   4. Osteomyelitis of lumbar spine (CMS/HCC)  M46.26 730.28   5. Impaired mobility and ADLs  Z74.09 V49.89    Z78.9      Patient Active Problem List   Diagnosis   • Eustachian tube dysfunction   • Sensorineural hearing loss   • Lumbago of multiple sites in spine with sciatica   • Spondylolisthesis of lumbar region   • Coronary artery disease   • Hyperlipidemia   • Hypertension   • Myalgia due to statin   • S/P coronary artery stent placement   • Pacemaker   • Seropositive rheumatoid arthritis of multiple sites (CMS/[facility])   • Sick sinus syndrome (CMS/[facility])   • Other osteoporosis without current pathological fracture   • Allergic-infective asthma   • Arthritis of both knees   • Vasovagal syncope   • Bilateral bunions   • Bronchitis   • Cardiac pacemaker syndrome   • Charcot's joint of foot, left   • Constipation   • 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   • Intrinsic asthma   • Left carotid bruit   • Neutropenia (CMS/[facility])   • Obesity   • Primary osteoarthritis of left knee   • Psoriasis vulgaris   • Recurrent acute serous otitis media of both ears   • S/P lumbar laminectomy   • Thrombocytopenia (CMS/[facility])   • Hypothyroidism   • Vitamin D deficiency   • Myxedema heart disease   • Spondylolisthesis of lumbar region   • Syncope, cardiogenic   • Rupture of gluteus minimus tendon   • Muscle tear   • Syncope, recurrent   • Leukocytosis   • Headache   • Elevated CPK   • Staphylococcus  aureus bacteremia   • Right hip pain, suspected infection   • Obesity (BMI 30-39.9)   • Stenosis of cervical spine with myelopathy (CMS/[facility])   • Spinal stenosis, lumbar region, with neurogenic claudication   • Discitis of lumbar region   • Osteomyelitis of lumbar spine (CMS/[facility])   • Discitis, unspecified, lumbar region   • Iliopsoas abscess (CMS/[facility])     Past Medical History:   Diagnosis Date   • Arthritis    • Asthma    • Chronic sinusitis    • Coronary artery disease    • Eustachian tube dysfunction    • Heart disease    • Herpes simplex    • Hyperlipidemia    • Hypertension    • Knee dislocation    • Labral tear of right hip joint    • Laryngitis sicca    • Laryngitis, chronic    • MI (myocardial infarction) (CMS/[facility])    • Otorrhea    • Sensorineural hearing loss    • Sjogren's disease (CMS/[facility])      Past Surgical History:   Procedure Laterality Date   • A-V CARDIAC PACEMAKER INSERTION  2016   • ATRIAL CARDIAC PACEMAKER INSERTION     • CARDIAC CATHETERIZATION     • CATARACT EXTRACTION     • CERVICAL CORPECTOMY N/A 3/3/2021    Procedure: CERVICAL 6 CORPECTOMY WITH TITANIUM CAGE WITH NEURO MONITORING;  Surgeon: Bandar Shea MD;  Location:  [facility] OR;  Service: Neurosurgery;  Laterality: N/A;   • COLONOSCOPY  11/08/2011    One fold in the ascending colon which showed ulcer otherwise normal exam   • COLONOSCOPY  11/12/2004    Normal exam repeat in five years   • CORONARY ANGIOPLASTY WITH STENT PLACEMENT      X 2; 2013 & 2014   • ENDOSCOPY  07/10/2014    Normal exam   • HIP ABDUCTION TENOTOMY BILATERAL Right 1/14/2021    Procedure: RIGHT HIP GLUTEUS MEDLUS / MINIMUS REPAIR, POSSIBLE ACHILLES ALLOGRAFT;  Surgeon: Nino Carlson MD;  Location:  [facility];  Service: Orthopedics;  Laterality: Right;   • INCISION AND DRAINAGE HIP Right 2/9/2021    Procedure: HIP INCISION AND DRAINAGE;  Surgeon: Nino Carlson MD;  Location:  [facility];  Service: Orthopedics;  Laterality: Right;   • JOINT REPLACEMENT     • LUMBAR FUSION  N/A 1/19/2018    Procedure: L3-4,L4-5 DECOMPRESSION, POSTERIOR SPINAL FUSION WITH INSTRUMENTATION;  Surgeon: Fortino Oropeza MD;  Location:  [facility];  Service:    • LUMBAR LAMINECTOMY WITH FUSION Left 1/17/2018    Procedure: LEFT L3-4 L4-5 LATERAL LUMBAR INTERBODY FUSION;  Surgeon: Fortino Oropeza MD;  Location:  [facility];  Service:    • MYRINGOTOMY W/ TUBES  09/04/2014    TUBES NO LONGER IN PLACE   • OTHER SURGICAL HISTORY      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 so hardware was removed and spacers were placed   • REPLACEMENT TOTAL KNEE Right      General Information     Row Name 03/23/21 1331          OT Time and Intention    Document Type  evaluation  -     Mode of Treatment  occupational therapy  -     Row Name 03/23/21 1331          General Information    Patient Profile Reviewed  yes  -J     Prior Level of Function  independent:;all household mobility;community mobility;transfer;bed mobility;ADL's  -J     Existing Precautions/Restrictions  fall;spinal;other (see comments) C-collar AAT, R LE WBAT, LSO, wound vac.  -JJ     Barriers to Rehab  previous functional deficit;physical barrier  -J     Row Name 03/23/21 1331          Living Environment    Lives With  alone  -     Row Name 03/23/21 1331          Home Main Entrance    Number of Stairs, Main Entrance  one  -J     Stair Railings, Main Entrance  none  -     Row Name 03/23/21 1331          Stairs Within Home, Primary    Number of Stairs, Within Home, Primary  none  -     Row Name 03/23/21 1331          Cognition    Orientation Status (Cognition)  oriented x 4  -     Row Name 03/23/21 1331          Safety Issues, Functional Mobility    Impairments Affecting Function (Mobility)  balance;endurance/activity tolerance;pain;strength;cognition  -JJ     Cognitive Impairments, Mobility Safety/Performance  awareness, need for assistance;insight into deficits/self-awareness;impulsivity;safety precaution awareness;safety precaution follow-through  -        User Key  (r) = Recorded By, (t) = Taken By, (c) = Cosigned By    Initials Name Provider Type    Trinity Haddad OTR/L Occupational Therapist          Mobility/ADL's     Row Name 03/23/21 1331          Bed Mobility    Bed Mobility  rolling left;rolling right;sidelying-sit;sit-sidelying  -     Rolling Left [place] (Bed Mobility)  minimum assist (75% patient effort)  -     Rolling Right [place] (Bed Mobility)  minimum assist (75% patient effort);verbal cues;nonverbal cues (demo/gesture)  -     Supine-Sit [place] (Bed Mobility)  nonverbal cues (demo/gesture);verbal cues  -     Sidelying-Sit [place] (Bed Mobility)  minimum assist (75% patient effort);verbal cues  -     Sit-Sidelying [place] (Bed Mobility)  minimum assist (75% patient effort);verbal cues  -     Bed Mobility, Safety Issues  decreased use of arms for pushing/pulling;decreased use of legs for bridging/pushing  -     Assistive Device (Bed Mobility)  bed rails;head of bed elevated  -     Row Name 03/23/21 1331          Transfers    Transfers  sit-stand transfer;toilet transfer  -     Sit-Stand [place] (Transfers)  contact guard  -     [place] Level (Toilet Transfer)  contact guard;verbal cues  -     Assistive Device (Toilet Transfer)  walker, front-wheeled;commode, bedside without drop arms  -     Row Name 03/23/21 1331          Sit-Stand Transfer    Assistive Device (Sit-Stand Transfers)  walker, front-wheeled  -     Row Name 03/23/21 1331          Toilet Transfer    Type (Toilet Transfer)  sit-stand;stand-sit  -     Row Name 03/23/21 1331          Functional Mobility    Functional Mobility- Ind. Level  contact guard assist;verbal cues required  -     Functional Mobility- Device  rolling walker  -     Functional Mobility- Safety Issues  supplemental O2  -     Functional Mobility- Comment  small steps from bed to bsc and back to bed  -     Row Name 03/23/21 1331           Activities of Daily Living    BADL Assessment/Intervention  upper body dressing;lower body dressing;toileting  -J     Row Name 03/23/21 1331          Mobility    Extremity Weight-bearing Status  right lower extremity  -JJ     Right Lower Extremity (Weight-bearing Status)  weight-bearing as tolerated (WBAT)  -JJ     Row Name 03/23/21 1331          Upper Body Dressing Assessment/Training    [place] Level (Upper Body Dressing)  don;doff;maximum assist (25% patient effort)  -JJ     Position (Upper Body Dressing)  edge of bed sitting  -JJ     Comment (Upper Body Dressing)  LSO  -JJ     Row Name 03/23/21 1331          Lower Body Dressing Assessment/Training    [place] Level (Lower Body Dressing)  maximum assist (25% patient effort)  -JJ     Position (Lower Body Dressing)  edge of bed sitting  -JJ     Comment (Lower Body Dressing)  adjust B socks seated at EOB  -     Row Name 03/23/21 1331          Toileting Assessment/Training    [place] Level (Toileting)  perform perineal hygiene;minimum assist (75% patient effort);set up;verbal cues  -     Assistive Devices (Toileting)  commode, bedside without drop arms  -JJ     Position (Toileting)  supported standing  -       User Key  (r) = Recorded By, (t) = Taken By, (c) = Cosigned By    Initials Name Provider Type    Trinity Haddad OTR/L Occupational Therapist        Obj/Interventions     Row Name 03/23/21 1331          Sensory Assessment (Somatosensory)    Sensory Assessment (Somatosensory)  sensation intact in BUEs  -     Row Name 03/23/21 1331          Vision Assessment/Intervention    Visual Impairment/Limitations  WFL  -J     Row Name 03/23/21 1331          Range of Motion Comprehensive    General Range of Motion  bilateral upper extremity ROM WFL  -     Row Name 03/23/21 1331          Strength Comprehensive (MMT)    Comment, General Manual Muscle Testing (MMT) Assessment  B UE strength grossly 4/5  -     Row Name 03/23/21 1331           Balance    Balance Assessment  sitting static balance;standing static balance  -JJ     Static Sitting Balance  sitting, edge of bed;WFL  -JJ     Dynamic Standing Balance  mild impairment;supported;standing  -JJ       User Key  (r) = Recorded By, (t) = Taken By, (c) = Cosigned By    Initials Name Provider Type    Trinity Haddad, OTR/L Occupational Therapist        Goals/Plan     Row Name 03/23/21 1331          Transfer Goal 1 (OT)    Activity/Assistive Device (Transfer Goal 1, OT)  toilet;shower chair  -JJ     [place] Level/Cues Needed (Transfer Goal 1, OT)  standby assist  -JJ     Time Frame (Transfer Goal 1, OT)  long term goal (LTG);by discharge  -JJ     Progress/Outcome (Transfer Goal 1, OT)  goal ongoing  -     Row Name 03/23/21 1331          Dressing Goal 1 (OT)    Activity/Device (Dressing Goal 1, OT)  lower body dressing  -JJ     [place]/Cues Needed (Dressing Goal 1, OT)  moderate assist (50-74% patient effort)  -JJ     Time Frame (Dressing Goal 1, OT)  long term goal (LTG);by discharge  -JJ     Progress/Outcome (Dressing Goal 1, OT)  goal ongoing  -     Row Name 03/23/21 1331          Toileting Goal 1 (OT)    Activity/Device (Toileting Goal 1, OT)  toileting skills, all  -JJ     [place] Level/Cues Needed (Toileting Goal 1, OT)  supervision required  -JJ     Time Frame (Toileting Goal 1, OT)  long term goal (LTG);by discharge  -JJ     Progress/Outcome (Toileting Goal 1, OT)  goal ongoing  -     Row Name 03/23/21 1331          Therapy Assessment/Plan (OT)    Planned Therapy Interventions (OT)  activity tolerance training;adaptive equipment training;BADL retraining;functional balance retraining;occupation/activity based interventions;patient/caregiver education/training;transfer/mobility retraining;orthotic fabrication/fitting/training  -JJ       User Key  (r) = Recorded By, (t) = Taken By, (c) = Cosigned By    Initials Name Provider Type    Trinity Haddad, OTR/L Occupational  Therapist        Clinical Impression     Row Name 03/23/21 1331          Pain Assessment    Additional Documentation  Pain Scale: Numbers Pre/Post-Treatment (Group)  -JJ     Row Name 03/23/21 1331          Pain Scale: Numbers Pre/Post-Treatment    Pretreatment Pain Rating  6/10  -JJ     Posttreatment Pain Rating  6/10  -JJ     Pain Location  back  -JJ     Pain Intervention(s)  Medication (See MAR);Ambulation/increased activity;Repositioned  -JJ     Row Name 03/23/21 1331          Plan of Care Review    Plan of Care Reviewed With  patient  -JJ     Outcome Summary  OT eval completed. Pt is alert and orientedx3. Pt and family educated on spinal precuations, LSO fitting/mgt and adl modifications. She was able to complete all bed mobility with Min A and vcs for body mechanics. Max A to adjust B socks seated at EOB and don/doff LSO. CGA for sit <> stand t/f from EOB and to take small steps from bed <> bsc with rwx. She would benefit from skilled OT services to address these deficits. Recommend d/c acute IP rehab for continued skilled.  -J     Row Name 03/23/21 1331          Therapy Assessment/Plan (OT)    Patient/Family Therapy Goal Statement (OT)  return home  -     Rehab Potential (OT)  good, to achieve stated therapy goals  -     Criteria for Skilled Therapeutic Interventions Met (OT)  yes;skilled treatment is necessary  -     Therapy Frequency (OT)  5 times/wk  -     Predicted Duration of Therapy Intervention (OT)  10 days  -JJ     Row Name 03/23/21 1331          Therapy Plan Review/Discharge Plan (OT)    Anticipated Discharge Disposition (OT)  inpatient rehabilitation facility  -J     Row Name 03/23/21 1331          Vital Signs    Pre Patient Position  Supine  -JJ     Intra Patient Position  Standing  -JJ     Post Patient Position  Side Lying  -JJ     Row Name 03/23/21 1331          Positioning and Restraints    Pre-Treatment Position  in bed  -JJ     Post Treatment Position  bed  -JJ     In Bed  notified  nsg;side lying left;supine;call light within reach;encouraged to call for assist;with PT;side rails up x2;with family/caregiver  -QUANG       User Key  (r) = Recorded By, (t) = Taken By, (c) = Cosigned By    Initials Name Provider Type    Trinity Haddad OTR/L Occupational Therapist        Outcome Measures     Row Name 03/23/21 1331          How much help from another is currently needed...    Putting on and taking off regular lower body clothing?  1  -JJ     Bathing (including washing, rinsing, and drying)  2  -JJ     Toileting (which includes using toilet bed pan or urinal)  2  -JJ     Putting on and taking off regular upper body clothing  2  -JJ     Taking care of personal grooming (such as brushing teeth)  3  -JJ     Eating meals  4  -JJ     AM-PAC 6 Clicks Score (OT)  14  -     Row Name 03/23/21 1331          Functional Assessment    Outcome Measure Options  AM-PAC 6 Clicks Daily Activity (OT)  -       User Key  (r) = Recorded By, (t) = Taken By, (c) = Cosigned By    Initials Name Provider Type    Trinity Haddad OTR/L Occupational Therapist        Occupational Therapy Education                 Title: PT OT SLP Therapies (In Progress)     Topic: Occupational Therapy (In Progress)     Point: ADL training (Done)     Description:   Instruct learner(s) on proper safety adaptation and remediation techniques during self care or transfers.   Instruct in proper use of assistive devices.              Learning Progress Summary           Patient Acceptance, E, VU by QUANG at 3/23/2021 1453   Family Acceptance, E, VU by QUANG at 3/23/2021 1453                   Point: Home exercise program (Not Started)     Description:   Instruct learner(s) on appropriate technique for monitoring, assisting and/or progressing therapeutic exercises/activities.              Learner Progress:  Not documented in this visit.          Point: Precautions (Done)     Description:   Instruct learner(s) on prescribed precautions during  self-care and functional transfers.              Learning Progress Summary           Patient Acceptance, E, VU by  at 3/23/2021 1453   Family Acceptance, E, VU by  at 3/23/2021 1453                   Point: Body mechanics (Done)     Description:   Instruct learner(s) on proper positioning and spine alignment during self-care, functional mobility activities and/or exercises.              Learning Progress Summary           Patient Acceptance, E, VU by  at 3/23/2021 1453   Family Acceptance, E, VU by  at 3/23/2021 1453                               User Key     Initials Effective Dates Name Provider Type Discipline     12/04/20 -  Trinity Day, OTR/L Occupational Therapist OT              OT Recommendation and Plan  Planned Therapy Interventions (OT): activity tolerance training, adaptive equipment training, BADL retraining, functional balance retraining, occupation/activity based interventions, patient/caregiver education/training, transfer/mobility retraining, orthotic fabrication/fitting/training  Therapy Frequency (OT): 5 times/wk  Plan of Care Review  Plan of Care Reviewed With: patient  Outcome Summary: OT eval completed. Pt is alert and orientedx3. Pt and family educated on spinal precuations, LSO fitting/mgt and adl modifications. She was able to complete all bed mobility with Min A and vcs for body mechanics. Max A to adjust B socks seated at EOB and don/doff LSO. CGA for sit <> stand t/f from EOB and to take small steps from bed <> bsc with rwx. She would benefit from skilled OT services to address these deficits. Recommend d/c acute IP rehab for continued skilled.     Time Calculation:   Time Calculation- OT     Row Name 03/23/21 1453             Time Calculation- OT    OT Start Time  1342  -      OT Stop Time  1436  -      OT Time Calculation (min)  54 min  -      OT Received On  03/23/21  -      OT Goal Re-Cert Due Date  04/02/21  -        User Key  (r) = Recorded By, (t) = Taken  By, (c) = Cosigned By    Initials Name Provider Type    Trinity Haddad OTR/L Occupational Therapist        Therapy Charges for Today     Code Description Service Date Service Provider Modifiers Qty    08486083504 HC OT EVAL MOD COMPLEXITY 4 3/23/2021 Trinity Day OTR/L GO 1               TIFFANY Sr  3/23/2021

## 2021-03-23 NOTE — PLAN OF CARE
Problem: Adult Inpatient Plan of Care  Goal: Plan of Care Review  Recent Flowsheet Documentation  Taken 3/23/2021 1332 by Alyssa Gallardo, PT DPT  Progress: no change  Plan of Care Reviewed With: patient  Outcome Summary: PT wound eval completed. Pt with c/o 6/10 pain in low back. Instillation therapy with normal saline continued to R lateral hip wound. Infusion rate for the normal saline is 12mL every 5 hours. Wound bed reveals sutures, granulated TFL and a much healthier wound bed color as compared to previous sessions. Wound seems to have gained more approximation as well. Periwound blistered with redness and irritation, therefore limited perimeter of drape. Wound measures 15 cm x 7 cm x 10.5 cm. PT will check wound dressing daily and change 3x a week.

## 2021-03-23 NOTE — THERAPY TREATMENT NOTE
Patient Name: Tawny Shin  : 1955    MRN: [id number]                              Today's Date: 3/23/2021       Admit Date: 3/22/2021    Visit Dx:     Therapist utilized gait belt, applied non-slipped socks, provided fall risk education/prevention, & facilitated muscle strengthening PRN to reduce patient falls risk during this session.      ICD-10-CM ICD-9-CM   1. Discitis, unspecified, lumbar region  M46.46 722.93   2. Spinal stenosis, lumbar region, with neurogenic claudication  M48.062 724.03   3. Discitis of lumbar region  M46.46 722.93   4. Osteomyelitis of lumbar spine (CMS/HCC)  M46.26 730.28   5. Impaired mobility and ADLs  Z74.09 V49.89    Z78.9      Patient Active Problem List   Diagnosis   • Eustachian tube dysfunction   • Sensorineural hearing loss   • Lumbago of multiple sites in spine with sciatica   • Spondylolisthesis of lumbar region   • Coronary artery disease   • Hyperlipidemia   • Hypertension   • Myalgia due to statin   • S/P coronary artery stent placement   • Pacemaker   • Seropositive rheumatoid arthritis of multiple sites (CMS/[facility])   • Sick sinus syndrome (CMS/[facility])   • Other osteoporosis without current pathological fracture   • Allergic-infective asthma   • Arthritis of both knees   • Vasovagal syncope   • Bilateral bunions   • Bronchitis   • Cardiac pacemaker syndrome   • Charcot's joint of foot, left   • Constipation   • 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   • Intrinsic asthma   • Left carotid bruit   • Neutropenia (CMS/[facility])   • Obesity   • Primary osteoarthritis of left knee   • Psoriasis vulgaris   • Recurrent acute serous otitis media of both ears   • S/P lumbar laminectomy   • Thrombocytopenia (CMS/[facility])   • Hypothyroidism   • Vitamin D deficiency   • Myxedema heart disease   • Spondylolisthesis of lumbar region   • Syncope, cardiogenic   • Rupture of gluteus minimus tendon   • Muscle tear   • Syncope, recurrent   • Leukocytosis   • Headache   • Elevated CPK   • Staphylococcus  aureus bacteremia   • Right hip pain, suspected infection   • Obesity (BMI 30-39.9)   • Stenosis of cervical spine with myelopathy (CMS/[facility])   • Spinal stenosis, lumbar region, with neurogenic claudication   • Discitis of lumbar region   • Osteomyelitis of lumbar spine (CMS/[facility])   • Discitis, unspecified, lumbar region   • Iliopsoas abscess (CMS/[facility])     Past Medical History:   Diagnosis Date   • Arthritis    • Asthma    • Chronic sinusitis    • Coronary artery disease    • Eustachian tube dysfunction    • Heart disease    • Herpes simplex    • Hyperlipidemia    • Hypertension    • Knee dislocation    • Labral tear of right hip joint    • Laryngitis sicca    • Laryngitis, chronic    • MI (myocardial infarction) (CMS/[facility])    • Otorrhea    • Sensorineural hearing loss    • Sjogren's disease (CMS/[facility])      Past Surgical History:   Procedure Laterality Date   • A-V CARDIAC PACEMAKER INSERTION  2016   • ATRIAL CARDIAC PACEMAKER INSERTION     • CARDIAC CATHETERIZATION     • CATARACT EXTRACTION     • CERVICAL CORPECTOMY N/A 3/3/2021    Procedure: CERVICAL 6 CORPECTOMY WITH TITANIUM CAGE WITH NEURO MONITORING;  Surgeon: Bandar Shea MD;  Location:  [facility];  Service: Neurosurgery;  Laterality: N/A;   • COLONOSCOPY  11/08/2011    One fold in the ascending colon which showed ulcer otherwise normal exam   • COLONOSCOPY  11/12/2004    Normal exam repeat in five years   • CORONARY ANGIOPLASTY WITH STENT PLACEMENT      X 2; 2013 & 2014   • ENDOSCOPY  07/10/2014    Normal exam   • HIP ABDUCTION TENOTOMY BILATERAL Right 1/14/2021    Procedure: RIGHT HIP GLUTEUS MEDLUS / MINIMUS REPAIR, POSSIBLE ACHILLES ALLOGRAFT;  Surgeon: Nino Carlson MD;  Location:  [facility];  Service: Orthopedics;  Laterality: Right;   • INCISION AND DRAINAGE HIP Right 2/9/2021    Procedure: HIP INCISION AND DRAINAGE;  Surgeon: Nino Carlson MD;  Location:  [facility];  Service: Orthopedics;  Laterality: Right;   • JOINT REPLACEMENT     • LUMBAR FUSION  N/A 1/19/2018    Procedure: L3-4,L4-5 DECOMPRESSION, POSTERIOR SPINAL FUSION WITH INSTRUMENTATION;  Surgeon: Fortino Oropeza MD;  Location:  [facility];  Service:    • LUMBAR LAMINECTOMY WITH FUSION Left 1/17/2018    Procedure: LEFT L3-4 L4-5 LATERAL LUMBAR INTERBODY FUSION;  Surgeon: Fortino Oropeza MD;  Location:  [facility];  Service:    • MYRINGOTOMY W/ TUBES  09/04/2014    TUBES NO LONGER IN PLACE   • OTHER SURGICAL HISTORY      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 so hardware was removed and spacers were placed   • REPLACEMENT TOTAL KNEE Right      General Information     Row Name 03/23/21 1331          OT Time and Intention    Document Type  evaluation  -     Mode of Treatment  occupational therapy  -     Row Name 03/23/21 1331          General Information    Patient Profile Reviewed  yes  -J     Prior Level of Function  independent:;all household mobility;community mobility;transfer;bed mobility;ADL's  -J     Existing Precautions/Restrictions  fall;spinal;other (see comments) C-collar AAT, R LE WBAT, LSO, wound vac.  -JJ     Barriers to Rehab  previous functional deficit;physical barrier  -J     Row Name 03/23/21 1331          Living Environment    Lives With  alone  -     Row Name 03/23/21 1331          Home Main Entrance    Number of Stairs, Main Entrance  one  -J     Stair Railings, Main Entrance  none  -     Row Name 03/23/21 1331          Stairs Within Home, Primary    Number of Stairs, Within Home, Primary  none  -     Row Name 03/23/21 1331          Cognition    Orientation Status (Cognition)  oriented x 4  -     Row Name 03/23/21 1331          Safety Issues, Functional Mobility    Impairments Affecting Function (Mobility)  balance;endurance/activity tolerance;pain;strength;cognition  -JJ     Cognitive Impairments, Mobility Safety/Performance  awareness, need for assistance;insight into deficits/self-awareness;impulsivity;safety precaution awareness;safety precaution follow-through  -        User Key  (r) = Recorded By, (t) = Taken By, (c) = Cosigned By    Initials Name Provider Type    Trinity Haddad OTR/L Occupational Therapist          Mobility/ADL's     Row Name 03/23/21 1331          Bed Mobility    Bed Mobility  rolling left;rolling right;sidelying-sit;sit-sidelying  -     Rolling Left [place] (Bed Mobility)  minimum assist (75% patient effort)  -     Rolling Right [place] (Bed Mobility)  minimum assist (75% patient effort);verbal cues;nonverbal cues (demo/gesture)  -     Supine-Sit Hayes (Bed Mobility)  nonverbal cues (demo/gesture);verbal cues  -     Sidelying-Sit Hayes (Bed Mobility)  minimum assist (75% patient effort);verbal cues  -     Sit-Sidelying Hayes (Bed Mobility)  minimum assist (75% patient effort);verbal cues  -     Bed Mobility, Safety Issues  decreased use of arms for pushing/pulling;decreased use of legs for bridging/pushing  -     Assistive Device (Bed Mobility)  bed rails;head of bed elevated  -     Row Name 03/23/21 1331          Transfers    Transfers  sit-stand transfer;toilet transfer  -     Sit-Stand Hayes (Transfers)  contact guard  -     Hayes Level (Toilet Transfer)  contact guard;verbal cues  -     Assistive Device (Toilet Transfer)  walker, front-wheeled;commode, bedside without drop arms  -     Row Name 03/23/21 1331          Sit-Stand Transfer    Assistive Device (Sit-Stand Transfers)  walker, front-wheeled  -     Row Name 03/23/21 1331          Toilet Transfer    Type (Toilet Transfer)  sit-stand;stand-sit  -     Row Name 03/23/21 1331          Functional Mobility    Functional Mobility- Ind. Level  contact guard assist;verbal cues required  -     Functional Mobility- Device  rolling walker  -     Functional Mobility- Safety Issues  supplemental O2  -     Functional Mobility- Comment  small steps from bed to bsc and back to bed  -     Row Name 03/23/21 1331           Activities of Daily Living    BADL Assessment/Intervention  upper body dressing;lower body dressing;toileting  -J     Row Name 03/23/21 1331          Mobility    Extremity Weight-bearing Status  right lower extremity  -JJ     Right Lower Extremity (Weight-bearing Status)  weight-bearing as tolerated (WBAT)  -JJ     Row Name 03/23/21 1331          Upper Body Dressing Assessment/Training    [place] Level (Upper Body Dressing)  don;doff;maximum assist (25% patient effort)  -JJ     Position (Upper Body Dressing)  edge of bed sitting  -JJ     Comment (Upper Body Dressing)  LSO  -JJ     Row Name 03/23/21 1331          Lower Body Dressing Assessment/Training    [place] Level (Lower Body Dressing)  maximum assist (25% patient effort)  -JJ     Position (Lower Body Dressing)  edge of bed sitting  -JJ     Comment (Lower Body Dressing)  adjust B socks seated at EOB  -     Row Name 03/23/21 1331          Toileting Assessment/Training    [place] Level (Toileting)  perform perineal hygiene;minimum assist (75% patient effort);set up;verbal cues  -     Assistive Devices (Toileting)  commode, bedside without drop arms  -JJ     Position (Toileting)  supported standing  -       User Key  (r) = Recorded By, (t) = Taken By, (c) = Cosigned By    Initials Name Provider Type    Trinity Haddad OTR/L Occupational Therapist        Obj/Interventions     Row Name 03/23/21 1331          Sensory Assessment (Somatosensory)    Sensory Assessment (Somatosensory)  sensation intact in BUEs  -     Row Name 03/23/21 1331          Vision Assessment/Intervention    Visual Impairment/Limitations  WFL  -J     Row Name 03/23/21 1331          Range of Motion Comprehensive    General Range of Motion  bilateral upper extremity ROM WFL  -     Row Name 03/23/21 1331          Strength Comprehensive (MMT)    Comment, General Manual Muscle Testing (MMT) Assessment  B UE strength grossly 4/5  -     Row Name 03/23/21 1331           Balance    Balance Assessment  sitting static balance;standing static balance  -JJ     Static Sitting Balance  sitting, edge of bed;WFL  -JJ     Dynamic Standing Balance  mild impairment;supported;standing  -JJ       User Key  (r) = Recorded By, (t) = Taken By, (c) = Cosigned By    Initials Name Provider Type    Trinity Haddad, OTR/L Occupational Therapist        Goals/Plan     Row Name 03/23/21 1331          Transfer Goal 1 (OT)    Activity/Assistive Device (Transfer Goal 1, OT)  toilet;shower chair  -JJ     [place] Level/Cues Needed (Transfer Goal 1, OT)  standby assist  -JJ     Time Frame (Transfer Goal 1, OT)  long term goal (LTG);by discharge  -JJ     Progress/Outcome (Transfer Goal 1, OT)  goal ongoing  -     Row Name 03/23/21 1331          Dressing Goal 1 (OT)    Activity/Device (Dressing Goal 1, OT)  lower body dressing  -JJ     [place]/Cues Needed (Dressing Goal 1, OT)  moderate assist (50-74% patient effort)  -JJ     Time Frame (Dressing Goal 1, OT)  long term goal (LTG);by discharge  -JJ     Progress/Outcome (Dressing Goal 1, OT)  goal ongoing  -     Row Name 03/23/21 1331          Toileting Goal 1 (OT)    Activity/Device (Toileting Goal 1, OT)  toileting skills, all  -JJ     [place] Level/Cues Needed (Toileting Goal 1, OT)  supervision required  -JJ     Time Frame (Toileting Goal 1, OT)  long term goal (LTG);by discharge  -JJ     Progress/Outcome (Toileting Goal 1, OT)  goal ongoing  -     Row Name 03/23/21 1331          Therapy Assessment/Plan (OT)    Planned Therapy Interventions (OT)  activity tolerance training;adaptive equipment training;BADL retraining;functional balance retraining;occupation/activity based interventions;patient/caregiver education/training;transfer/mobility retraining;orthotic fabrication/fitting/training  -JJ       User Key  (r) = Recorded By, (t) = Taken By, (c) = Cosigned By    Initials Name Provider Type    Trinity Haddad, OTR/L Occupational  Therapist        Clinical Impression     Row Name 03/23/21 1331          Pain Assessment    Additional Documentation  Pain Scale: Numbers Pre/Post-Treatment (Group)  -JJ     Row Name 03/23/21 1331          Pain Scale: Numbers Pre/Post-Treatment    Pretreatment Pain Rating  6/10  -JJ     Posttreatment Pain Rating  6/10  -JJ     Pain Location  back  -JJ     Pain Intervention(s)  Medication (See MAR);Ambulation/increased activity;Repositioned  -JJ     Row Name 03/23/21 1331          Plan of Care Review    Plan of Care Reviewed With  patient  -JJ     Outcome Summary  OT eval completed. Pt is alert and orientedx3. Pt and family educated on spinal precuations, LSO fitting/mgt and adl modifications. She was able to complete all bed mobility with Min A and vcs for body mechanics. Max A to adjust B socks seated at EOB and don/doff LSO. CGA for sit <> stand t/f from EOB and to take small steps from bed <> bsc with rwx. She would benefit from skilled OT services to address these deficits. Recommend d/c acute IP rehab for continued skilled.  -J     Row Name 03/23/21 1331          Therapy Assessment/Plan (OT)    Patient/Family Therapy Goal Statement (OT)  return home  -     Rehab Potential (OT)  good, to achieve stated therapy goals  -     Criteria for Skilled Therapeutic Interventions Met (OT)  yes;skilled treatment is necessary  -     Therapy Frequency (OT)  5 times/wk  -     Predicted Duration of Therapy Intervention (OT)  10 days  -JJ     Row Name 03/23/21 1331          Therapy Plan Review/Discharge Plan (OT)    Anticipated Discharge Disposition (OT)  inpatient rehabilitation facility  -J     Row Name 03/23/21 1331          Vital Signs    Pre Patient Position  Supine  -JJ     Intra Patient Position  Standing  -JJ     Post Patient Position  Side Lying  -JJ     Row Name 03/23/21 1331          Positioning and Restraints    Pre-Treatment Position  in bed  -JJ     Post Treatment Position  bed  -JJ     In Bed  notified  nsg;side lying left;supine;call light within reach;encouraged to call for assist;with PT;side rails up x2;with family/caregiver  -QUANG       User Key  (r) = Recorded By, (t) = Taken By, (c) = Cosigned By    Initials Name Provider Type    Trinity Haddad OTR/L Occupational Therapist        Outcome Measures     Row Name 03/23/21 1331          How much help from another is currently needed...    Putting on and taking off regular lower body clothing?  1  -JJ     Bathing (including washing, rinsing, and drying)  2  -JJ     Toileting (which includes using toilet bed pan or urinal)  2  -JJ     Putting on and taking off regular upper body clothing  2  -JJ     Taking care of personal grooming (such as brushing teeth)  3  -JJ     Eating meals  4  -JJ     AM-PAC 6 Clicks Score (OT)  14  -     Row Name 03/23/21 1331          Functional Assessment    Outcome Measure Options  AM-PAC 6 Clicks Daily Activity (OT)  -       User Key  (r) = Recorded By, (t) = Taken By, (c) = Cosigned By    Initials Name Provider Type    Trinity Haddad OTR/L Occupational Therapist        Occupational Therapy Education                 Title: PT OT SLP Therapies (In Progress)     Topic: Occupational Therapy (In Progress)     Point: ADL training (Done)     Description:   Instruct learner(s) on proper safety adaptation and remediation techniques during self care or transfers.   Instruct in proper use of assistive devices.              Learning Progress Summary           Patient Acceptance, E, VU by QUANG at 3/23/2021 1453   Family Acceptance, E, VU by QUANG at 3/23/2021 1453                   Point: Home exercise program (Not Started)     Description:   Instruct learner(s) on appropriate technique for monitoring, assisting and/or progressing therapeutic exercises/activities.              Learner Progress:  Not documented in this visit.          Point: Precautions (Done)     Description:   Instruct learner(s) on prescribed precautions during  self-care and functional transfers.              Learning Progress Summary           Patient Acceptance, E, VU by  at 3/23/2021 1453   Family Acceptance, E, VU by  at 3/23/2021 1453                   Point: Body mechanics (Done)     Description:   Instruct learner(s) on proper positioning and spine alignment during self-care, functional mobility activities and/or exercises.              Learning Progress Summary           Patient Acceptance, E, VU by  at 3/23/2021 1453   Family Acceptance, E, VU by  at 3/23/2021 1453                               User Key     Initials Effective Dates Name Provider Type Discipline     12/04/20 -  Trinity Day, OTR/L Occupational Therapist OT              OT Recommendation and Plan  Planned Therapy Interventions (OT): activity tolerance training, adaptive equipment training, BADL retraining, functional balance retraining, occupation/activity based interventions, patient/caregiver education/training, transfer/mobility retraining, orthotic fabrication/fitting/training  Therapy Frequency (OT): 5 times/wk  Plan of Care Review  Plan of Care Reviewed With: patient  Outcome Summary: OT eval completed. Pt is alert and orientedx3. Pt and family educated on spinal precuations, LSO fitting/mgt and adl modifications. She was able to complete all bed mobility with Min A and vcs for body mechanics. Max A to adjust B socks seated at EOB and don/doff LSO. CGA for sit <> stand t/f from EOB and to take small steps from bed <> bsc with rwx. She would benefit from skilled OT services to address these deficits. Recommend d/c acute IP rehab for continued skilled.     Time Calculation:   Time Calculation- OT     Row Name 03/23/21 1453             Time Calculation- OT    OT Start Time  1342  -      OT Stop Time  1436  -      OT Time Calculation (min)  54 min  -      OT Received On  03/23/21  -      OT Goal Re-Cert Due Date  04/02/21  -        User Key  (r) = Recorded By, (t) = Taken  By, (c) = Cosigned By    Initials Name Provider Type    Trinity Haddad OTR/L Occupational Therapist        Therapy Charges for Today     Code Description Service Date Service Provider Modifiers Qty    [id number] HC OT EVAL MOD COMPLEXITY 4 3/23/2021 Trinity Day OTR/L GO 1               TIFFANY Sr  3/23/2021

## 2021-03-23 NOTE — PROGRESS NOTES
NEUROSURGERY DAILY PROGRESS NOTE    ASSESSMENT:   Tawny Shin is a 65 y.o. with a significant comorbidity rheumatoid arthritis on disease modifying agents, prior lumbar fusion by Dr. Oropeza in 2018.  She presents with a new problem of postop surgical infection of her right hip with wound VAC dressing, progressive right lower extremity proximal weakness without numbness or radiculopathy. Physical exam findings of right iliopsoas weakness, and global hyperreflexia with Babinski and right Patience's and decreased  strength bilaterally.  Their imaging shows CT of the cervical spine with severe cervical stenosis at severe stenosis C5-6 and C6/7 and adjacent segment disease at L1/2 and L2/3 with concern for osteomyelitis discitis.    Past Medical History:   Diagnosis Date   • Arthritis    • Asthma    • Chronic sinusitis    • Coronary artery disease    • Eustachian tube dysfunction    • Heart disease    • Herpes simplex    • Hyperlipidemia    • Hypertension    • Knee dislocation    • Labral tear of right hip joint    • Laryngitis sicca    • Laryngitis, chronic    • MI (myocardial infarction) (CMS/HCC)    • Otorrhea    • Sensorineural hearing loss    • Sjogren's disease (CMS/HCC)      Active Hospital Problems    Diagnosis    • Discitis, unspecified, lumbar region    • Iliopsoas abscess (CMS/HCC)    • Stenosis of cervical spine with myelopathy (CMS/[facility])      Added automatically from request for surgery [id number]     • Spinal stenosis, lumbar region, with neurogenic claudication      Added automatically from request for surgery [id number]     • Osteomyelitis of lumbar spine (CMS/HCC)      Added automatically from request for surgery [id number]     • Discitis of lumbar region      Added automatically from request for surgery [id number]     • Staphylococcus aureus bacteremia    • Obesity (BMI 30-39.9)    • Cardiac pacemaker syndrome      Overview:   - heart block  - implanted 11/16     • Seropositive rheumatoid arthritis of multiple  "sites (CMS/[facility])      Overview:   -myochrysine '93-'96  -methotrexate '96--->11/98;r/s  restarted 2/99--> 8/14 (anemia)  -sulfasalazine- not effective  -penicillamine 6/98-->10/98; no effect  -leflunomide 11/98-->  - Humira '13-->didn't take  - Enbrel 12/14-->3/15- no effect!      Last Assessment & Plan:   - \"aching all over\" because she had to be off her anti-rheumatic drugs for 2 weeks in preparation for her R knee surgery  - her R knee surgery got cancelled because she developed cellulitis of her L arm (with fever). She was started on Bactrim, and then transitioned to doxycyline on Aug 1 2018 (3 week course) and her R knee surgery was postponed to 1 month from now.   - As such, she restarted her anti-rheumatics on Aug 1 2018.  - She will have to hold her anti-rheumatics again 2 weeks before her surgery  - She feels her medications are otherwise working well. No changes to be made to her current regimen, which is Humira injection q2 week, leflunomide (Arava), and salicylate     - Pt is concerned that when she transitions insurance in December of this year, she will lose her salicylate. We had a discussion and expressed that it would be best for her to stay on salicylate because we do not want the blood thinning effects of aspirin. Dr. Walden will plan to write a prior authorization for her for salicylate.     • Coronary artery disease      PCI RCA '10, NSTEMI requiring LAD PCI in '13    [facility] 9/3/19 at [facility]: no significant dz; stents patent     • S/P coronary artery stent placement      HPI: Appears to be resting comfortably.  Continues to complain of lumbar back and right hip pain that radiates to the right groin.  No acute events overnight.    PLAN:   Neuro: Stable.  Neuro unchanged, right IP weakness   Neurochecks every 4 hours     Concern for discitis/osteomyelitis L1/2  L1/2 severe central canal stenosis  Severe stenosis from adjacent segment disease   POD# 0 right L4-5 microdiscectomy    GUILLERMO     Right psoas " abscess              I&D right psoas abscess performed per IR 3/2/2021 and 3/22/2021   Body fluid culture: 4+ WBCs, 2+ gram-positive cocci   Fungus culture: Pending   Anaerobic culture: Pending   ABF culture: Pending    Cervical stenosis                       POD #17 C6 corpectomy   Continue cervical collar   Intermittent difficulty with swallowing.   Incision: C, D, I    CV: No acute issues.  VS WNL  Pulm: No acute issues.  Maintaining O2 sat.  Continuous pulse oximetry  : No acute issues.  Voiding spontaneously  FEN: NPO since midnight  GI: No acute issues.  ORTHO: Open wound right posterior hip.  MSSA infection, wound vac per PT  ID: WBC normal.  Continue cefazolin per ID   Postop vancomycin, pharmacy to dose  Heme:  DVT prophylaxis held for operative procedure; SCDs  Pain: Tolerable at present  Dispo: PT/OT with LSO brace    CHIEF COMPLAINT:   Lumbar back/right hip and anterior right thigh pain    Subjective  Symptom stable.  Doing well    Temp:  [98 °F (36.7 °C)-98.8 °F (37.1 °C)] 98 °F (36.7 °C)  Heart Rate:  [73-90] 73  Resp:  [16-18] 18  BP: (126-188)/(62-94) 143/67    Objective:  General Appearance:  Well-appearing and in no acute distress.    Vital signs: (most recent): Blood pressure 143/67, pulse 73, temperature 98 °F (36.7 °C), temperature source Temporal, resp. rate 18, SpO2 98 %, not currently breastfeeding.      Neurologic Exam     Mental Status   Oriented to person, place, and time.   Attention: normal. Concentration: normal.   Speech: speech is normal   Level of consciousness: alert    Bright and awake.  Oriented x3.  Follows commands without prompting showing thumbs up into fingers bilaterally.     Cranial Nerves     CN II   Visual fields full to confrontation.     CN III, IV, VI   Pupils are equal, round, and reactive to light.  Extraocular motions are normal.     CN V   Facial sensation intact.     CN VII   Facial expression full, symmetric.     CN VIII   CN VIII normal.     CN IX, X   CN IX  normal.     CN XI   CN XI normal.     Motor Exam     Strength   Right deltoid: 5/5  Left deltoid: 5/5  Right biceps: 5/5  Left biceps: 5/5  Right triceps: 5/5  Left triceps: 5/5  Right wrist extension: 5/5  Left wrist extension: 5/5  Right interossei: 4/5  Left interossei: 4/5  Right iliopsoas: 2/5  Left iliopsoas: 5/5  Right quadriceps: 2/5  Left quadriceps: 5/5  Right anterior tibial: 3/5  Left anterior tibial: 5/5  Right gastroc: 4/5  Left gastroc: 5/5       Sensory Exam   Sensory deficit distribution on right: L1    Gait, Coordination, and Reflexes     Tremor   Resting tremor: absent  Intention tremor: absent  Action tremor: absent    Reflexes   Right plantar: upgoing  Left plantar: normal  Right Mims: present  Left Mims: absent  Right ankle clonus: present  Left ankle clonus: present  Right pendular knee jerk: absent  Left pendular knee jerk: absent    Drains:   [REMOVED] Closed/Suction Drain 1 Anterior Neck Bulb 10 Fr. (Removed)   Site Description Unable to view 03/06/21 0815   Dressing Status Clean;Dry;Intact 03/06/21 0815   Drainage Appearance Serosanguineous 03/06/21 0815   Status To bulb suction 03/06/21 0815   Output (mL) 20 mL 03/05/21 1800       [REMOVED] Urethral Catheter Silicone 16 Fr. (Removed)   Daily Indications Required activity restriction from trauma, surgery, (e.g. unstable spine, fracture, hemodynamics) 03/04/21 0800   Site Assessment Clean;Skin intact 03/04/21 0800   Collection Container Standard drainage bag 03/04/21 0800   Securement Method Securing device 03/04/21 0800   Catheter care complete Yes 03/04/21 0800   Output (mL) 425 mL 03/04/21 0800       Imaging Results (Last 24 Hours)     Procedure Component Value Units Date/Time    XR Spine Lumbar AP & Lateral [[id number]] Resulted: 03/23/21 1027     Updated: 03/23/21 1031    FL C Arm During Surgery [[id number]] Resulted: 03/23/21 1027     Updated: 03/23/21 1031    XR Spine Cervical 2 View [[id number]] Collected: 03/23/21 0751      Updated: 03/23/21 0756    Narrative:      EXAMINATION: XR SPINE CERVICAL 2 VW- 3/23/2021 7:51 AM CDT     HISTORY: Cervical spinal fusion     COMPARISON: 3/4/2021     FINDINGS:  C2-C6 are visible on the lateral view. There has been prior corpectomy  at C6 with anterior fusion at C5 and C7. There is no appreciable  hardware fracture. There is straightening of the normal cervical  lordosis. Alignment of the cervical spine appears normal. Vertebral body  heights appear well maintained. The surgical drain has been removed.  There is mild residual prevertebral edema. There is no evidence of acute  fracture.       Impression:      Status post C6 corpectomy with anterior fusion at C5-C7.  Interval removal of surgical drain. Improved prevertebral edema.  This report was finalized on 03/23/2021 07:53 by Dr. Dillon Flores MD.        Lab Results (last 24 hours)     Procedure Component Value Units Date/Time    Wound Culture - Wound, Spine, Lumbar [[id number]] Collected: 03/23/21 1054    Specimen: Wound from Spine, Lumbar Updated: 03/23/21 1101    Comprehensive Metabolic Panel [[id number]]  (Abnormal) Collected: 03/23/21 0420    Specimen: Blood Updated: 03/23/21 0517     Glucose 78 mg/dL      BUN 7 mg/dL      Creatinine 0.35 mg/dL      Sodium 137 mmol/L      Potassium 4.0 mmol/L      Chloride 100 mmol/L      CO2 27.0 mmol/L      Calcium 7.9 mg/dL      Total Protein 5.6 g/dL      Albumin 2.30 g/dL      ALT (SGPT) <5 U/L      AST (SGOT) 17 U/L      Alkaline Phosphatase 88 U/L      Total Bilirubin 0.4 mg/dL      eGFR Non African Amer >150 mL/min/1.73      Globulin 3.3 gm/dL      A/G Ratio 0.7 g/dL      BUN/Creatinine Ratio 20.0     Anion Gap 10.0 mmol/L     Narrative:      GFR Normal >60  Chronic Kidney Disease <60  Kidney Failure <15      CBC Auto Differential [[id number]]  (Abnormal) Collected: 03/23/21 0420    Specimen: Blood Updated: 03/23/21 0452     WBC 5.41 10*3/mm3      RBC 3.28 10*6/mm3      Hemoglobin 9.2 g/dL       Hematocrit 29.4 %      MCV 89.6 fL      MCH 28.0 pg      MCHC 31.3 g/dL      RDW 16.5 %      RDW-SD 53.5 fl      MPV 9.6 fL      Platelets 210 10*3/mm3      Neutrophil % 56.0 %      Lymphocyte % 23.7 %      Monocyte % 15.0 %      Eosinophil % 3.9 %      Basophil % 0.7 %      Immature Grans % 0.7 %      Neutrophils, Absolute 3.03 10*3/mm3      Lymphocytes, Absolute 1.28 10*3/mm3      Monocytes, Absolute 0.81 10*3/mm3      Eosinophils, Absolute 0.21 10*3/mm3      Basophils, Absolute 0.04 10*3/mm3      Immature Grans, Absolute 0.04 10*3/mm3      nRBC 0.0 /100 WBC         [id number]  Taiwo Prado, APRN

## 2021-03-23 NOTE — THERAPY EVALUATION
Patient Name: Tawny Shin  : 1955    MRN: [id number]                              Today's Date: 3/23/2021       Admit Date: 3/22/2021    Visit Dx:     ICD-10-CM ICD-9-CM   1. Discitis, unspecified, lumbar region  M46.46 722.93   2. Spinal stenosis, lumbar region, with neurogenic claudication  M48.062 724.03   3. Discitis of lumbar region  M46.46 722.93   4. Osteomyelitis of lumbar spine (CMS/HCC)  M46.26 730.28   5. Impaired mobility and ADLs  Z74.09 V49.89    Z78.9    6. Impaired mobility  Z74.09 799.89     Patient Active Problem List   Diagnosis   • Eustachian tube dysfunction   • Sensorineural hearing loss   • Lumbago of multiple sites in spine with sciatica   • Spondylolisthesis of lumbar region   • Coronary artery disease   • Hyperlipidemia   • Hypertension   • Myalgia due to statin   • S/P coronary artery stent placement   • Pacemaker   • Seropositive rheumatoid arthritis of multiple sites (CMS/HCC)   • Sick sinus syndrome (CMS/[facility])   • Other osteoporosis without current pathological fracture   • Allergic-infective asthma   • Arthritis of both knees   • Vasovagal syncope   • Bilateral bunions   • Bronchitis   • Cardiac pacemaker syndrome   • Charcot's joint of foot, left   • Constipation   • 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   • Intrinsic asthma   • Left carotid bruit   • Neutropenia (CMS/HCC)   • Obesity   • Primary osteoarthritis of left knee   • Psoriasis vulgaris   • Recurrent acute serous otitis media of both ears   • S/P lumbar laminectomy   • Thrombocytopenia (CMS/[facility])   • Hypothyroidism   • Vitamin D deficiency   • Myxedema heart disease   • Spondylolisthesis of lumbar region   • Syncope, cardiogenic   • Rupture of gluteus minimus tendon   • Muscle tear   • Syncope, recurrent   • Leukocytosis   • Headache   • Elevated CPK   • Staphylococcus aureus bacteremia   • Right hip pain, suspected infection   • Obesity (BMI 30-39.9)   • Stenosis of cervical spine with myelopathy (CMS/HCC)   • Spinal  stenosis, lumbar region, with neurogenic claudication   • Discitis of lumbar region   • Osteomyelitis of lumbar spine (CMS/HCC)   • Discitis, unspecified, lumbar region   • Iliopsoas abscess (CMS/HCC)     Past Medical History:   Diagnosis Date   • Arthritis    • Asthma    • Chronic sinusitis    • Coronary artery disease    • Eustachian tube dysfunction    • Heart disease    • Herpes simplex    • Hyperlipidemia    • Hypertension    • Knee dislocation    • Labral tear of right hip joint    • Laryngitis sicca    • Laryngitis, chronic    • MI (myocardial infarction) (CMS/HCC)    • Otorrhea    • Sensorineural hearing loss    • Sjogren's disease (CMS/HCC)      Past Surgical History:   Procedure Laterality Date   • A-V CARDIAC PACEMAKER INSERTION  2016   • ATRIAL CARDIAC PACEMAKER INSERTION     • CARDIAC CATHETERIZATION     • CATARACT EXTRACTION     • CERVICAL CORPECTOMY N/A 3/3/2021    Procedure: CERVICAL 6 CORPECTOMY WITH TITANIUM CAGE WITH NEURO MONITORING;  Surgeon: Bandar Shea MD;  Location:  [facility] OR;  Service: Neurosurgery;  Laterality: N/A;   • COLONOSCOPY  11/08/2011    One fold in the ascending colon which showed ulcer otherwise normal exam   • COLONOSCOPY  11/12/2004    Normal exam repeat in five years   • CORONARY ANGIOPLASTY WITH STENT PLACEMENT      X 2; 2013 & 2014   • ENDOSCOPY  07/10/2014    Normal exam   • HIP ABDUCTION TENOTOMY BILATERAL Right 1/14/2021    Procedure: RIGHT HIP GLUTEUS MEDLUS / MINIMUS REPAIR, POSSIBLE ACHILLES ALLOGRAFT;  Surgeon: Nino Carlson MD;  Location: [facility] OR;  Service: Orthopedics;  Laterality: Right;   • INCISION AND DRAINAGE HIP Right 2/9/2021    Procedure: HIP INCISION AND DRAINAGE;  Surgeon: Nino Carlson MD;  Location:  [facility] OR;  Service: Orthopedics;  Laterality: Right;   • JOINT REPLACEMENT     • LUMBAR FUSION N/A 1/19/2018    Procedure: L3-4,L4-5 DECOMPRESSION, POSTERIOR SPINAL FUSION WITH INSTRUMENTATION;  Surgeon: Fortino Oropeza MD;  Location:  [facility]  OR;  Service:    • LUMBAR LAMINECTOMY WITH FUSION Left 1/17/2018    Procedure: LEFT L3-4 L4-5 LATERAL LUMBAR INTERBODY FUSION;  Surgeon: Fortino Oropeza MD;  Location: [facility] OR;  Service:    • MYRINGOTOMY W/ TUBES  09/04/2014    TUBES NO LONGER IN PLACE   • OTHER SURGICAL HISTORY      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 so hardware was removed and spacers were placed   • REPLACEMENT TOTAL KNEE Right      General Information     Row Name 03/23/21 1330          Physical Therapy Time and Intention    Document Type  evaluation;wound care s/p lumbar discectomy micro, Lumbar 1/2 right 3/23  -SB     Mode of Treatment  physical therapy  -SB     Row Name 03/23/21 1330          General Information    Patient Profile Reviewed  yes  -SB     Prior Level of Function  independent:;community mobility;all household mobility;bed mobility;transfer;ADL's prior to hospitalization  -SB     Existing Precautions/Restrictions  fall;spinal;other (see comments) c-collar ATT, LSO when OOB, w/v to R hip, RLE WBAT  -SB     Barriers to Rehab  previous functional deficit;physical barrier;medically complex  -SB     Row Name 03/23/21 1330          Living Environment    Lives With  alone  -SB     Row Name 03/23/21 1330          Home Main Entrance    Number of Stairs, Main Entrance  one  -SB     Stair Railings, Main Entrance  none  -SB     Row Name 03/23/21 1330          Stairs Within Home, Primary    Number of Stairs, Within Home, Primary  none  -SB     Row Name 03/23/21 1330          Cognition    Orientation Status (Cognition)  oriented x 4  -SB     Row Name 03/23/21 1330          Safety Issues, Functional Mobility    Safety Issues Affecting Function (Mobility)  awareness of need for assistance;friction/shear risk;insight into deficits/self-awareness;safety precaution awareness;safety precautions follow-through/compliance  -SB     Impairments Affecting Function (Mobility)  balance;endurance/activity tolerance;pain;strength;cognition  -SB      Cognitive Impairments, Mobility Safety/Performance  awareness, need for assistance;insight into deficits/self-awareness;impulsivity;safety precaution awareness;safety precaution follow-through  -SB       User Key  (r) = Recorded By, (t) = Taken By, (c) = Cosigned By    Initials Name Provider Type    Alyssa Roberts PT DPT Physical Therapist        Mobility     Row Name 03/23/21 1330          Bed Mobility    Bed Mobility  rolling left;rolling right;sidelying-sit;sit-sidelying  -SB     Rolling Left [place] (Bed Mobility)  minimum assist (75% patient effort)  -SB     Rolling Right [place] (Bed Mobility)  minimum assist (75% patient effort);verbal cues;nonverbal cues (demo/gesture)  -SB     Supine-Sit [place] (Bed Mobility)  nonverbal cues (demo/gesture);verbal cues  -SB     Sidelying-Sit [place] (Bed Mobility)  minimum assist (75% patient effort);verbal cues  -SB     Sit-Sidelying [place] (Bed Mobility)  minimum assist (75% patient effort);verbal cues  -SB     Assistive Device (Bed Mobility)  bed rails;head of bed elevated  -SB     Row Name 03/23/21 1330          Transfers    Comment (Transfers)  took a couple of sidesteps to BSC  -SB     Row Name 03/23/21 1330          Bed-Chair Transfer    Bed-Chair [place] (Transfers)  contact guard;verbal cues  -SB     Assistive Device (Bed-Chair Transfers)  walker, front-wheeled  -SB     Row Name 03/23/21 1330          Sit-Stand Transfer    Sit-Stand [place] (Transfers)  contact guard  -SB     Assistive Device (Sit-Stand Transfers)  walker, front-wheeled  -SB     Row Name 03/23/21 1330          Mobility    Extremity Weight-bearing Status  right lower extremity  -SB     Right Lower Extremity (Weight-bearing Status)  weight-bearing as tolerated (WBAT)  -SB       User Key  (r) = Recorded By, (t) = Taken By, (c) = Cosigned By    Initials Name Provider Type    Alyssa Roberts PT DPT Physical Therapist        Obj/Interventions     Row Name  03/23/21 1330          Range of Motion Comprehensive    General Range of Motion  lower extremity range of motion deficits identified  -SB     Comment, General Range of Motion  LLE WFL, R hip not tested, R knee imp 75% and ankle WFL  -SB     Row Name 03/23/21 1330          Strength Comprehensive (MMT)    General Manual Muscle Testing (MMT) Assessment  lower extremity strength deficits identified  -SB     Comment, General Manual Muscle Testing (MMT) Assessment  LLE 4/5; RLE grossly 3+/5  -SB     Row Name 03/23/21 1330          Balance    Balance Assessment  sitting static balance;standing static balance;standing dynamic balance  -SB     Static Sitting Balance  WFL;sitting, edge of bed  -SB     Static Standing Balance  mild impairment RW and CGA  -SB     Dynamic Standing Balance  mild impairment RW and CGA  -SB     Row Name 03/23/21 1330          Sensory Assessment (Somatosensory)    Sensory Assessment (Somatosensory)  other (see comments) reports numbness in RLE  -SB       User Key  (r) = Recorded By, (t) = Taken By, (c) = Cosigned By    Initials Name Provider Type    Alyssa Roberts, PT DPT Physical Therapist        Goals/Plan     Row Name 03/23/21 1330          Bed Mobility Goal 1 (PT)    Activity/Assistive Device (Bed Mobility Goal 1, PT)  rolling to left;rolling to right;sit to supine;supine to sit  -SB     [place] Level/Cues Needed (Bed Mobility Goal 1, PT)  standby assist  -SB     Time Frame (Bed Mobility Goal 1, PT)  long term goal (LTG)  -SB     Progress/Outcomes (Bed Mobility Goal 1, PT)  goal ongoing  -SB     Row Name 03/23/21 1330          Transfer Goal 1 (PT)    Activity/Assistive Device (Transfer Goal 1, PT)  sit-to-stand/stand-to-sit;bed-to-chair/chair-to-bed;walker, rolling  -SB     [place] Level/Cues Needed (Transfer Goal 1, PT)  standby assist  -SB     Time Frame (Transfer Goal 1, PT)  long term goal (LTG)  -SB     Progress/Outcome (Transfer Goal 1, PT)  goal ongoing  -SB     Row Name  03/23/21 1330          Gait Training Goal 1 (PT)    Activity/Assistive Device (Gait Training Goal 1, PT)  gait (walking locomotion);assistive device use;decrease fall risk;improve balance and speed;increase endurance/gait distance;walker, rolling  -SB     [place] Level (Gait Training Goal 1, PT)  contact guard assist  -SB     Distance (Gait Training Goal 1, PT)  75 feet  -SB     Time Frame (Gait Training Goal 1, PT)  long term goal (LTG)  -SB     Progress/Outcome (Gait Training Goal 1, PT)  goal ongoing  -SB       User Key  (r) = Recorded By, (t) = Taken By, (c) = Cosigned By    Initials Name Provider Type    SB Alyssa Gallardo, PT DPT Physical Therapist        Clinical Impression     Row Name 03/23/21 1330          Pain    Additional Documentation  Pain Scale: Numbers Pre/Post-Treatment (Group)  -SB     Row Name 03/23/21 1330          Pain Scale: Numbers Pre/Post-Treatment    Pretreatment Pain Rating  6/10  -SB     Posttreatment Pain Rating  6/10  -SB     Pain Location  back  -SB     Pain Intervention(s)  Medication (See MAR);Ambulation/increased activity;Repositioned  -SB     Row Name 03/23/21 1330          Plan of Care Review    Plan of Care Reviewed With  patient  -SB     Progress  no change  -SB     Outcome Summary  PT mobility eval completed. Pt alert and oriented x3 with c/o pain in low back. Pt edu on spinal precautions and use of LSO brace when OOB. Pt performed rolling and sup to sit t/f with min A and VC for maintaining spinal precautions. Pt displays generalized weakness in BLE. Pt performed sit to stand and bed to chair t/f with CGA and RW, able to take a couple of side steps to BSC. Pt with progressing forward flexed posture with increased fatigue. Pt will benefit from skilled PT to improve functional mobility and independence. Recommend d/c to IRF.  -SB     Row Name 03/23/21 1330          Therapy Assessment/Plan (PT)    Patient/Family Therapy Goals Statement (PT)  improve function  -SB     Rehab  Potential (PT)  good, to achieve stated therapy goals  -SB     Criteria for Skilled Interventions Met (PT)  yes;meets criteria;skilled treatment is necessary  -SB     Predicted Duration of Therapy Intervention (PT)  until d/c  -SB     Row Name 03/23/21 1330          Vital Signs    O2 Delivery Pre Treatment  supplemental O2  -SB     O2 Delivery Intra Treatment  supplemental O2  -SB     O2 Delivery Post Treatment  supplemental O2  -SB     Row Name 03/23/21 1330          Positioning and Restraints    Pre-Treatment Position  in bed  -SB     Post Treatment Position  bed  -SB     In Bed  supine;call light within reach;encouraged to call for assist;side rails up x3;with family/caregiver;SCD pump applied  -SB       User Key  (r) = Recorded By, (t) = Taken By, (c) = Cosigned By    Initials Name Provider Type    Alyssa Roberts PT DPT Physical Therapist        Outcome Measures     Row Name 03/23/21 1330          How much help from another person do you currently need...    Turning from your back to your side while in flat bed without using bedrails?  3  -SB     Moving from lying on back to sitting on the side of a flat bed without bedrails?  3  -SB     Moving to and from a bed to a chair (including a wheelchair)?  3  -SB     Standing up from a chair using your arms (e.g., wheelchair, bedside chair)?  3  -SB     Climbing 3-5 steps with a railing?  2  -SB     To walk in hospital room?  3  -SB     AM-PAC 6 Clicks Score (PT)  17  -SB     Row Name 03/23/21 1330          Functional Assessment    Outcome Measure Options  AM-PAC 6 Clicks Basic Mobility (PT)  -SB       User Key  (r) = Recorded By, (t) = Taken By, (c) = Cosigned By    Initials Name Provider Type    Alyssa Roberts PT DPT Physical Therapist        Physical Therapy Education                 Title: PT OT SLP Therapies (In Progress)     Topic: Physical Therapy (In Progress)     Point: Mobility training (Done)     Learning Progress Summary           Patient  Acceptance, E, VU,NR by SB at 3/23/2021 1603    Comment: pt edu on POC, benefits of act, dc plans, LSO brace, spinal precautions, purpose of NPWT   Family Acceptance, E, VU,NR by SB at 3/23/2021 1603    Comment: pt edu on POC, benefits of act, dc plans, LSO brace, spinal precautions, purpose of NPWT                   Point: Home exercise program (Not Started)     Learner Progress:  Not documented in this visit.          Point: Body mechanics (Done)     Learning Progress Summary           Patient Acceptance, E, VU,NR by SB at 3/23/2021 1603    Comment: pt edu on POC, benefits of act, dc plans, LSO brace, spinal precautions, purpose of NPWT   Family Acceptance, E, VU,NR by SB at 3/23/2021 1603    Comment: pt edu on POC, benefits of act, dc plans, LSO brace, spinal precautions, purpose of NPWT                   Point: Precautions (Done)     Learning Progress Summary           Patient Acceptance, E, VU,NR by SB at 3/23/2021 1603    Comment: pt edu on POC, benefits of act, dc plans, LSO brace, spinal precautions, purpose of NPWT   Family Acceptance, E, VU,NR by SB at 3/23/2021 1603    Comment: pt edu on POC, benefits of act, dc plans, LSO brace, spinal precautions, purpose of NPWT                               User Key     Initials Effective Dates Name Provider Type Discipline    SB 10/31/19 -  Alyssa Gallardo, PT DPT Physical Therapist PT              PT Recommendation and Plan  Planned Therapy Interventions (PT): balance training, bed mobility training, gait training, home exercise program, patient/family education, postural re-education, transfer training, strengthening, wound care  Plan of Care Reviewed With: patient  Progress: no change  Outcome Summary: PT mobility eval completed. Pt alert and oriented x3 with c/o pain in low back. Pt edu on spinal precautions and use of LSO brace when OOB. Pt performed rolling and sup to sit t/f with min A and VC for maintaining spinal precautions. Pt displays generalized weakness  in BLE. Pt performed sit to stand and bed to chair t/f with CGA and RW, able to take a couple of side steps to BSC. Pt with progressing forward flexed posture with increased fatigue. Pt will benefit from skilled PT to improve functional mobility and independence. Recommend d/c to IRF.     Time Calculation:   PT Charges     Row Name 03/23/21 1603             Time Calculation    Start Time  1330  -SB      Stop Time  1515  -SB      Time Calculation (min)  105 min  -SB      PT Received On  03/23/21  -SB      PT Goal Re-Cert Due Date  04/02/21  -SB        User Key  (r) = Recorded By, (t) = Taken By, (c) = Cosigned By    Initials Name Provider Type    SB Alyssa Gallardo, PT DPT Physical Therapist        Therapy Charges for Today     Code Description Service Date Service Provider Modifiers Qty    69486104092 HC PT NEG PRESS WOUND OVER 50SQCM DME4 3/23/2021 Alyssa Gallardo, PT DPT GP 1    26097399080 HC PT EVAL MOD COMPLEXITY 3 3/23/2021 Alyssa Gallardo, PT DPT GP 1          PT G-Codes  Outcome Measure Options: AM-PAC 6 Clicks Daily Activity (OT)  AM-PAC 6 Clicks Score (PT): 17  AM-PAC 6 Clicks Score (OT): 14    Alyssa Gallardo PT DPT  3/23/2021

## 2021-03-23 NOTE — PLAN OF CARE
Goal Outcome Evaluation:  Plan of Care Reviewed With: patient  Progress: no change  Outcome Summary: PT mobility eval completed. Pt alert and oriented x3 with c/o pain in low back. Pt edu on spinal precautions and use of LSO brace when OOB. Pt performed rolling and sup to sit t/f with min A and VC for maintaining spinal precautions. Pt displays generalized weakness in BLE. Pt performed sit to stand and bed to chair t/f with CGA and RW, able to take a couple of side steps to BSC. Pt with progressing forward flexed posture with increased fatigue. Pt will benefit from skilled PT to improve functional mobility and independence. Recommend d/c to IRF.

## 2021-03-23 NOTE — ANESTHESIA PROCEDURE NOTES
Airway  Urgency: elective    Date/Time: 3/23/2021 9:38 AM  Airway not difficult    General Information and Staff    Patient location during procedure: OR  CRNA: Rachele Aleman CRNA    Indications and Patient Condition  Indications for airway management: airway protection    Preoxygenated: yes  MILS maintained throughout  Mask difficulty assessment: 2 - vent by mask + OA or adjuvant +/- NMBA    Final Airway Details  Final airway type: endotracheal airway      Successful airway: ETT  Cuffed: yes   Successful intubation technique: direct laryngoscopy  Facilitating devices/methods: intubating stylet  Endotracheal tube insertion site: oral  Blade: [place]  Blade size: 3  ETT size (mm): 7.0  Cormack-Lehane Classification: grade IIb - view of arytenoids or posterior of glottis only  Placement verified by: chest auscultation and capnometry   Cuff volume (mL): 5  Measured from: lips  ETT/EBT  to lips (cm): 21  Number of attempts at approach: 2  Assessment: lips, teeth, and gum same as pre-op and atraumatic intubation    Additional Comments  Atraumatic.

## 2021-03-23 NOTE — PLAN OF CARE
Goal Outcome Evaluation:         Patient returned to unit this pm after lumbar discectomy. Has small incision to lower back closed with skin glue and GUILLERMO drain. Patient c/o pain to her lower vack 10 out 10 on pain scale. She is drowsy but responds to voice and opens eyes spontaneously. BP running a little high, clonidine given with good results. All other VS are stable. Family at bedside.

## 2021-03-23 NOTE — PLAN OF CARE
Goal Outcome Evaluation:  Plan of Care Reviewed With: patient     Outcome Summary: OT eval completed. Pt is alert and orientedx3. Pt and family educated on spinal precuations, LSO fitting/mgt and adl modifications. She was able to complete all bed mobility with Min A and vcs for body mechanics. Max A to adjust B socks seated at EOB and don/doff LSO. CGA for sit <> stand t/f from EOB and to take small steps from bed <> bsc with rwx. She would benefit from skilled OT services to address these deficits. Recommend d/c acute IP rehab for continued skilled.

## 2021-03-23 NOTE — THERAPY EVALUATION
Acute Care - Wound/Debridement Initial Evaluation  [facility]     Patient Name: Tawny Shin  : 1955  MRN: [id number]  Today's Date: 3/23/2021                Admit Date: 3/22/2021    Visit Dx:    ICD-10-CM ICD-9-CM   1. Discitis, unspecified, lumbar region  M46.46 722.93   2. Spinal stenosis, lumbar region, with neurogenic claudication  M48.062 724.03   3. Discitis of lumbar region  M46.46 722.93   4. Osteomyelitis of lumbar spine (CMS/HCC)  M46.26 730.28   5. Impaired mobility and ADLs  Z74.09 V49.89    Z78.9    6. Impaired mobility  Z74.09 799.89       Patient Active Problem List   Diagnosis   • Eustachian tube dysfunction   • Sensorineural hearing loss   • Lumbago of multiple sites in spine with sciatica   • Spondylolisthesis of lumbar region   • Coronary artery disease   • Hyperlipidemia   • Hypertension   • Myalgia due to statin   • S/P coronary artery stent placement   • Pacemaker   • Seropositive rheumatoid arthritis of multiple sites (CMS/HCC)   • Sick sinus syndrome (CMS/HCC)   • Other osteoporosis without current pathological fracture   • Allergic-infective asthma   • Arthritis of both knees   • Vasovagal syncope   • Bilateral bunions   • Bronchitis   • Cardiac pacemaker syndrome   • Charcot's joint of foot, left   • Constipation   • 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   • Intrinsic asthma   • Left carotid bruit   • Neutropenia (CMS/HCC)   • Obesity   • Primary osteoarthritis of left knee   • Psoriasis vulgaris   • Recurrent acute serous otitis media of both ears   • S/P lumbar laminectomy   • Thrombocytopenia (CMS/HCC)   • Hypothyroidism   • Vitamin D deficiency   • Myxedema heart disease   • Spondylolisthesis of lumbar region   • Syncope, cardiogenic   • Rupture of gluteus minimus tendon   • Muscle tear   • Syncope, recurrent   • Leukocytosis   • Headache   • Elevated CPK   • Staphylococcus aureus bacteremia   • Right hip pain, suspected infection   • Obesity (BMI 30-39.9)   • Stenosis of  cervical spine with myelopathy (CMS/[facility])   • Spinal stenosis, lumbar region, with neurogenic claudication   • Discitis of lumbar region   • Osteomyelitis of lumbar spine (CMS/[facility])   • Discitis, unspecified, lumbar region   • Iliopsoas abscess (CMS/[facility])        Past Medical History:   Diagnosis Date   • Arthritis    • Asthma    • Chronic sinusitis    • Coronary artery disease    • Eustachian tube dysfunction    • Heart disease    • Herpes simplex    • Hyperlipidemia    • Hypertension    • Knee dislocation    • Labral tear of right hip joint    • Laryngitis sicca    • Laryngitis, chronic    • MI (myocardial infarction) (CMS/[facility])    • Otorrhea    • Sensorineural hearing loss    • Sjogren's disease (CMS/[facility])         Past Surgical History:   Procedure Laterality Date   • A-V CARDIAC PACEMAKER INSERTION  2016   • ATRIAL CARDIAC PACEMAKER INSERTION     • CARDIAC CATHETERIZATION     • CATARACT EXTRACTION     • CERVICAL CORPECTOMY N/A 3/3/2021    Procedure: CERVICAL 6 CORPECTOMY WITH TITANIUM CAGE WITH NEURO MONITORING;  Surgeon: Bandar Shea MD;  Location:  [facility] OR;  Service: Neurosurgery;  Laterality: N/A;   • COLONOSCOPY  11/08/2011    One fold in the ascending colon which showed ulcer otherwise normal exam   • COLONOSCOPY  11/12/2004    Normal exam repeat in five years   • CORONARY ANGIOPLASTY WITH STENT PLACEMENT      X 2; 2013 & 2014   • ENDOSCOPY  07/10/2014    Normal exam   • HIP ABDUCTION TENOTOMY BILATERAL Right 1/14/2021    Procedure: RIGHT HIP GLUTEUS MEDLUS / MINIMUS REPAIR, POSSIBLE ACHILLES ALLOGRAFT;  Surgeon: Nino Carlson MD;  Location:  [facility];  Service: Orthopedics;  Laterality: Right;   • INCISION AND DRAINAGE HIP Right 2/9/2021    Procedure: HIP INCISION AND DRAINAGE;  Surgeon: Nino Carlson MD;  Location:  [facility];  Service: Orthopedics;  Laterality: Right;   • JOINT REPLACEMENT     • LUMBAR FUSION N/A 1/19/2018    Procedure: L3-4,L4-5 DECOMPRESSION, POSTERIOR SPINAL FUSION WITH  INSTRUMENTATION;  Surgeon: Fortino Oropeza MD;  Location:  [facility];  Service:    • LUMBAR LAMINECTOMY WITH FUSION Left 1/17/2018    Procedure: LEFT L3-4 L4-5 LATERAL LUMBAR INTERBODY FUSION;  Surgeon: Fortino Oropeza MD;  Location:  [facility];  Service:    • MYRINGOTOMY W/ TUBES  09/04/2014    TUBES NO LONGER IN PLACE   • OTHER SURGICAL HISTORY      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 so hardware was removed and spacers were placed   • REPLACEMENT TOTAL KNEE Right            Wound 02/09/21 1715 Right hip Incision (Active)   Wound Image   03/23/21 1332   Dressing Appearance intact 03/23/21 1332   Closure Other (Comment) 03/23/21 0745   Base granulating;pink;red;slough 03/23/21 1332   Red (%), Wound Tissue Color 90 03/23/21 1332   Yellow (%), Wound Tissue Color 10 03/23/21 1332   Periwound blanchable;blistered;edematous;macerated;redness 03/23/21 1332   Periwound Temperature warm 03/23/21 1332   Periwound Skin Turgor firm 03/23/21 1332   Edges open 03/23/21 1332   Wound Length (cm) 15 cm 03/23/21 1332   Wound Width (cm) 7 cm 03/23/21 1332   Wound Depth (cm) 10.5 cm 03/23/21 1332   Drainage Characteristics/Odor serous 03/23/21 1332   Drainage Amount scant 03/23/21 1332   Care, Wound negative pressure wound therapy 03/23/21 1332   Dressing Care dressing changed 03/23/21 1332   Periwound Care barrier ointment applied;barrier film applied 03/23/21 1332   Wound Output (mL) 0 03/23/21 1332       Wound 03/03/21 1446 Right anterior neck Incision (Active)   Dressing Appearance dry;intact 03/23/21 0745   Closure Liquid skin adhesive 03/23/21 0745   Drainage Amount none 03/23/21 0745   Dressing Care skin substitute applied 03/22/21 2030       Wound 03/23/21 1021 lumbar spine Incision (Active)   Closure Liquid skin adhesive;Open to air 03/23/21 1245   Drainage Amount none 03/23/21 1245   Dressing Care dressing applied 03/23/21 1141         WOUND DEBRIDEMENT                        PT Assessment (last 12 hours)      PT Evaluation  and Treatment     Row Name             Wound 03/03/21 1446 Right anterior neck Incision    Wound - Properties Group Placement Date: 03/03/21  -HW Placement Time: 1446  -HW Present on Hospital Admission: N  -HW Side: Right  -TANIKA Orientation: anterior  -HW Location: neck  -HW Primary Wound Type: Incision  -HW    Retired Wound - Properties Group Date first assessed: 03/03/21  -HW Time first assessed: 1446  -HW Present on Hospital Admission: N  -HW Side: Right  -TANIKA Location: neck  -HW Primary Wound Type: Incision  -HW    Row Name 03/23/21 1332          Wound 02/09/21 1715 Right hip Incision    Wound - Properties Group Placement Date: 02/09/21  -SH Placement Time: 1715  -SH Present on Hospital Admission: Y  -SH Side: Right  -SH Location: hip  -SH Primary Wound Type: Incision  -SH    Wound Image  Images linked: 1  -SB     Dressing Appearance  intact  -SB     Base  granulating;pink;red;slough sutures, muscle  -SB     Red (%), Wound Tissue Color  90  -SB     Yellow (%), Wound Tissue Color  10  -SB     Periwound  blanchable;blistered;edematous;macerated;redness  -SB     Periwound Temperature  warm  -SB     Periwound Skin Turgor  firm  -SB     Edges  open  -SB     Wound Length (cm)  15 cm  -SB     Wound Width (cm)  7 cm  -SB     Wound Depth (cm)  10.5 cm  -SB     Drainage Characteristics/Odor  serous  -SB     Drainage Amount  scant  -SB     Care, Wound  negative pressure wound therapy instillation therapy  -SB     Dressing Care  dressing changed  -SB     Periwound Care  barrier ointment applied;barrier film applied  -SB     Wound Output (mL)  0  -SB     Retired Wound - Properties Group Date first assessed: 02/09/21  -SH Time first assessed: 1715 -SH Present on Hospital Admission: Y  -SH Side: Right  -SH Location: hip  -SH Primary Wound Type: Incision  -SH    Row Name             Wound 03/23/21 1021 lumbar spine Incision    Wound - Properties Group Placement Date: 03/23/21  -KINGS Placement Time: 1021  -KINGS Location: lumbar spine   -KINGS Primary Wound Type: Incision  -KINGS    Retired Wound - Properties Group Date first assessed: 03/23/21  -KINGS Time first assessed: 1021  -KINGS Location: lumbar spine  -KINGS Primary Wound Type: Incision  -KINGS    Row Name 03/23/21 1332          NPWT (Negative Pressure Wound Therapy) 02/09/21 1741 right hip    NPWT (Negative Pressure Wound Therapy) - Properties Group Placement Date: 02/09/21  -SH Placement Time: 1741 -SH Location: right hip  -SH Additional Comments: 1 pc of black sponge  -SH    Therapy Setting  continuous therapy  -SB     Dressing  foam, black gray foam  -SB     Instillation  normal saline  -SB     Pressure Setting  125 mmHg  -SB     Sponges Inserted  2 1 gray over muscle, 1 black  -SB     Sponges Removed  2  -SB     Finger sweep complete  Yes  -SB     Retired NPWT (Negative Pressure Wound Therapy) - Properties Group Placement Date: 02/09/21  -SH Placement Time: 1741 -SH Location: right hip  -SH Additional Comments: 1 pc of black sponge  -SH    Row Name 03/23/21 1332          Physical Therapy Goals    Wound Care Goal Selection (PT)  wound care, PT goal 1;wound care, PT goal 2  -SB     Row Name 03/23/21 1332          Wound Care Goal 1 (PT)    Wound Care Goal 1 (PT)  R hip wound dec in length from 15 cm to 13cm  -SB     Time Frame (Wound Care Goal 1, PT)  by discharge  -SB     Progress/Outcome (Wound Care Goal 1, PT)  goal ongoing  -SB     Row Name 03/23/21 1332          Wound Care Goal 2 (PT)    Wound Care Goal 2 (PT)  R hip wound dec in depth from 10.5 cm to 9 cm  -SB     Time Frame (Wound Care Goal 2, PT)  by discharge  -SB     Progress/Outcome (Wound Care Goal 2, PT)  goal ongoing  -SB       User Key  (r) = Recorded By, (t) = Taken By, (c) = Cosigned By    Initials Name Provider Type    Ewelina Eubanks, RN Registered Nurse    Esequiel Roche, RN Registered Nurse    Alyssa Roberts, PT DPT Physical Therapist    SH Laurie Herrera, RN Registered Nurse     Benjie Motta, RN Registered Nurse         Physical Therapy Education                 Title: PT OT SLP Therapies (In Progress)     Topic: Physical Therapy (In Progress)     Point: Mobility training (Done)     Learning Progress Summary           Patient Acceptance, E, VU,NR by SB at 3/23/2021 1603    Comment: pt edu on POC, benefits of act, dc plans, LSO brace, spinal precautions, purpose of NPWT   Family Acceptance, E, VU,NR by SB at 3/23/2021 1603    Comment: pt edu on POC, benefits of act, dc plans, LSO brace, spinal precautions, purpose of NPWT                   Point: Home exercise program (Not Started)     Learner Progress:  Not documented in this visit.          Point: Body mechanics (Done)     Learning Progress Summary           Patient Acceptance, E, VU,NR by SB at 3/23/2021 1603    Comment: pt edu on POC, benefits of act, dc plans, LSO brace, spinal precautions, purpose of NPWT   Family Acceptance, E, VU,NR by SB at 3/23/2021 1603    Comment: pt edu on POC, benefits of act, dc plans, LSO brace, spinal precautions, purpose of NPWT                   Point: Precautions (Done)     Learning Progress Summary           Patient Acceptance, E, VU,NR by SB at 3/23/2021 1603    Comment: pt edu on POC, benefits of act, dc plans, LSO brace, spinal precautions, purpose of NPWT   Family Acceptance, E, VU,NR by SB at 3/23/2021 1603    Comment: pt edu on POC, benefits of act, dc plans, LSO brace, spinal precautions, purpose of NPWT                               User Key     Initials Effective Dates Name Provider Type Discipline     10/31/19 -  Alyssa Gallardo, PT DPT Physical Therapist PT                Recommendation and Plan  Anticipated Discharge Disposition (PT): inpatient rehabilitation facility  Planned Therapy Interventions (PT): wound care  Therapy Frequency (PT): other (see comments) (check daily, change 3x a week)  Plan of Care Reviewed With: patient   Progress: no change       Progress: no change  Outcome Summary: PT wound eval completed. Pt with c/o  6/10 pain in low back. Instillation therapy with normal saline continued to R lateral hip wound. Infusion rate for the normal saline is 12mL every 5 hours. Wound bed reveals sutures, granulated TFL and a much healthier wound bed color as compared to previous sessions. Wound seems to have gained more approximation as well. Periwound blistered with redness and irritation, therefore limited perimeter of drape. Wound measures 15 cm x 7 cm x 10.5 cm. PT will check wound dressing daily and change 3x a week.  Plan of Care Reviewed With: patient            Time Calculation  PT Charges     Row Name 03/23/21 1603             Time Calculation    Start Time  1330  -SB      Stop Time  1515  -SB      Time Calculation (min)  105 min  -SB      PT Received On  03/23/21  -SB      PT Goal Re-Cert Due Date  04/02/21  -SB        User Key  (r) = Recorded By, (t) = Taken By, (c) = Cosigned By    Initials Name Provider Type    SB Alyssa Gallardo, PT DPT Physical Therapist            Therapy Charges for Today     Code Description Service Date Service Provider Modifiers Qty    83196345686 HC PT NEG PRESS WOUND OVER 50SQCM DME4 3/23/2021 Alyssa Gallardo, PT DPT GP 1    [id number] HC PT EVAL MOD COMPLEXITY 3 3/23/2021 Alyssa Gallardo, PT DPT GP 1            PT G-Codes  Outcome Measure Options: AM-PAC 6 Clicks Daily Activity (OT)  AM-PAC 6 Clicks Score (PT): 17  AM-PAC 6 Clicks Score (OT): 14       Alyssa Gallardo PT DPT  3/23/2021

## 2021-03-23 NOTE — ANESTHESIA PREPROCEDURE EVALUATION
Anesthesia Evaluation     Patient summary reviewed and Nursing notes reviewed   no history of anesthetic complications:  NPO Solid Status: > 8 hours  NPO Liquid Status: > 8 hours           Airway   Mallampati: I  TM distance: >3 FB  Neck ROM: full  No difficulty expected  Dental - normal exam     Pulmonary - normal exam   (+) asthma,  Cardiovascular - normal exam  Exercise tolerance: poor (<4 METS)    Patient on routine beta blocker and Beta blocker given within 24 hours of surgery    (+) pacemaker pacemaker, hypertension well controlled 2 medications or greater, past MI  >12 months, CAD, cardiac stents (2 stents, most recent 6 years ago) more than 12 months ago hyperlipidemia,     ROS comment: [employer] interrogation report reviewed from 2/9/21  15% a paced    Aspirin/plavix held 1 week ago,  Last dose xarelto > 4 days    Echo 2/10/21  · Estimated left ventricular EF = 65% Left ventricular systolic function is normal.  · Normal right ventricular cavity size and systolic function noted.  · All valves appear structurally and functionally normal with no evidence of any hemodynamically significant disease.  · There is no evidence of right to left shunt on bubble study.  · There is no evidence of intracardiac mass or thrombus.       ESTEFANI done to eval for valve vegetations which showed none    Neuro/Psych  (+) headaches, syncope, numbness,     GI/Hepatic/Renal/Endo    (+) obesity, morbid obesity,  thyroid problem hypothyroidism  (-) liver disease, no renal disease, diabetes    Musculoskeletal     (+) gait problem,       ROS comment: Right hip/leg weakness, s/p glut med repair  Post op she had syncopal episodes x 2, was admitted with wound infection/sepsis  Now diagnosed with cervical stenosis s/p decompression, myelopathy, lumbar discitis  Psoas abscess drained yesterday  Abdominal  - normal exam   Substance History - negative use     OB/GYN          Other   arthritis (rheumatoid arthritis, prednisone 2 mg daily),           Phys Exam Other: c collar in place                Anesthesia Plan    ASA 3     general   (Interrogate device in recovery)  intravenous induction     Anesthetic plan, all risks, benefits, and alternatives have been provided, discussed and informed consent has been obtained with: patient.  Use of blood products discussed with patient .   Plan discussed with CRNA.

## 2021-03-24 LAB
ALBUMIN SERPL-MCNC: 2.3 G/DL (ref 3.5–5.2)
ALBUMIN/GLOB SERPL: 0.7 G/DL
ALP SERPL-CCNC: 78 U/L (ref 39–117)
ALT SERPL W P-5'-P-CCNC: <5 U/L (ref 1–33)
ANION GAP SERPL CALCULATED.3IONS-SCNC: 8 MMOL/L (ref 5–15)
AST SERPL-CCNC: 19 U/L (ref 1–32)
BILIRUB SERPL-MCNC: 0.3 MG/DL (ref 0–1.2)
BUN SERPL-MCNC: 8 MG/DL (ref 8–23)
BUN/CREAT SERPL: 25 (ref 7–25)
CALCIUM SPEC-SCNC: 8.4 MG/DL (ref 8.6–10.5)
CHLORIDE SERPL-SCNC: 104 MMOL/L (ref 98–107)
CO2 SERPL-SCNC: 26 MMOL/L (ref 22–29)
CREAT SERPL-MCNC: 0.32 MG/DL (ref 0.57–1)
CRP SERPL-MCNC: 9.31 MG/DL (ref 0–0.5)
DEPRECATED RDW RBC AUTO: 54.5 FL (ref 37–54)
ERYTHROCYTE [DISTWIDTH] IN BLOOD BY AUTOMATED COUNT: 16.6 % (ref 12.3–15.4)
GFR SERPL CREATININE-BSD FRML MDRD: >150 ML/MIN/1.73
GLOBULIN UR ELPH-MCNC: 3.1 GM/DL
GLUCOSE SERPL-MCNC: 79 MG/DL (ref 65–99)
HCT VFR BLD AUTO: 28.3 % (ref 34–46.6)
HGB BLD-MCNC: 8.7 G/DL (ref 12–15.9)
LAB AP CASE REPORT: NORMAL
MCH RBC QN AUTO: 28.1 PG (ref 26.6–33)
MCHC RBC AUTO-ENTMCNC: 30.7 G/DL (ref 31.5–35.7)
MCV RBC AUTO: 91.3 FL (ref 79–97)
PATH REPORT.FINAL DX SPEC: NORMAL
PATH REPORT.GROSS SPEC: NORMAL
PLATELET # BLD AUTO: 218 10*3/MM3 (ref 140–450)
PMV BLD AUTO: 11.1 FL (ref 6–12)
POTASSIUM SERPL-SCNC: 4.5 MMOL/L (ref 3.5–5.2)
PROT SERPL-MCNC: 5.4 G/DL (ref 6–8.5)
RBC # BLD AUTO: 3.1 10*6/MM3 (ref 3.77–5.28)
SODIUM SERPL-SCNC: 138 MMOL/L (ref 136–145)
VANCOMYCIN TROUGH SERPL-MCNC: 9.2 MCG/ML (ref 5–20)
WBC # BLD AUTO: 4.71 10*3/MM3 (ref 3.4–10.8)

## 2021-03-24 PROCEDURE — 97116 GAIT TRAINING THERAPY: CPT

## 2021-03-24 PROCEDURE — 63710000001 PREDNISONE PER 5 MG: Performed by: NURSE PRACTITIONER

## 2021-03-24 PROCEDURE — 25010000002 VANCOMYCIN 10 G RECONSTITUTED SOLUTION: Performed by: NURSE PRACTITIONER

## 2021-03-24 PROCEDURE — 80202 ASSAY OF VANCOMYCIN: CPT | Performed by: NURSE PRACTITIONER

## 2021-03-24 PROCEDURE — 93005 ELECTROCARDIOGRAM TRACING: CPT | Performed by: NURSE PRACTITIONER

## 2021-03-24 PROCEDURE — 25010000002 VANCOMYCIN 10 G RECONSTITUTED SOLUTION: Performed by: NEUROLOGICAL SURGERY

## 2021-03-24 PROCEDURE — 97535 SELF CARE MNGMENT TRAINING: CPT

## 2021-03-24 PROCEDURE — 25010000002 CEFAZOLIN PER 500 MG: Performed by: NURSE PRACTITIONER

## 2021-03-24 PROCEDURE — 80053 COMPREHEN METABOLIC PANEL: CPT | Performed by: INTERNAL MEDICINE

## 2021-03-24 PROCEDURE — 99024 POSTOP FOLLOW-UP VISIT: CPT | Performed by: NURSE PRACTITIONER

## 2021-03-24 PROCEDURE — 94799 UNLISTED PULMONARY SVC/PX: CPT

## 2021-03-24 PROCEDURE — 93010 ELECTROCARDIOGRAM REPORT: CPT | Performed by: INTERNAL MEDICINE

## 2021-03-24 PROCEDURE — 25810000003 SODIUM CHLORIDE 0.9 % WITH KCL 20 MEQ 20-0.9 MEQ/L-% SOLUTION: Performed by: NURSE PRACTITIONER

## 2021-03-24 PROCEDURE — 25010000002 HEPARIN (PORCINE) PER 1000 UNITS: Performed by: NURSE PRACTITIONER

## 2021-03-24 PROCEDURE — 97110 THERAPEUTIC EXERCISES: CPT

## 2021-03-24 PROCEDURE — 85027 COMPLETE CBC AUTOMATED: CPT | Performed by: INTERNAL MEDICINE

## 2021-03-24 PROCEDURE — 86140 C-REACTIVE PROTEIN: CPT | Performed by: INTERNAL MEDICINE

## 2021-03-24 RX ORDER — CYCLOBENZAPRINE HCL 10 MG
10 TABLET ORAL 3 TIMES DAILY PRN
Status: DISCONTINUED | OUTPATIENT
Start: 2021-03-24 | End: 2021-03-26 | Stop reason: HOSPADM

## 2021-03-24 RX ADMIN — HEPARIN SODIUM 5000 UNITS: 5000 INJECTION INTRAVENOUS; SUBCUTANEOUS at 05:38

## 2021-03-24 RX ADMIN — CEFAZOLIN SODIUM 2 G: 10 INJECTION, POWDER, FOR SOLUTION INTRAVENOUS at 09:36

## 2021-03-24 RX ADMIN — VANCOMYCIN HYDROCHLORIDE 1250 MG: 10 INJECTION, POWDER, LYOPHILIZED, FOR SOLUTION INTRAVENOUS at 22:37

## 2021-03-24 RX ADMIN — CYCLOBENZAPRINE 10 MG: 10 TABLET, FILM COATED ORAL at 22:29

## 2021-03-24 RX ADMIN — OXYCODONE HYDROCHLORIDE AND ACETAMINOPHEN 1 TABLET: 7.5; 325 TABLET ORAL at 15:48

## 2021-03-24 RX ADMIN — POTASSIUM CHLORIDE AND SODIUM CHLORIDE 100 ML/HR: 900; 150 INJECTION, SOLUTION INTRAVENOUS at 15:59

## 2021-03-24 RX ADMIN — SODIUM CHLORIDE, PRESERVATIVE FREE 10 ML: 5 INJECTION INTRAVENOUS at 20:55

## 2021-03-24 RX ADMIN — ISOSORBIDE MONONITRATE 60 MG: 60 TABLET, EXTENDED RELEASE ORAL at 09:38

## 2021-03-24 RX ADMIN — PREDNISONE 2 MG: 1 TABLET ORAL at 09:39

## 2021-03-24 RX ADMIN — GABAPENTIN 100 MG: 100 CAPSULE ORAL at 20:55

## 2021-03-24 RX ADMIN — HEPARIN SODIUM 5000 UNITS: 5000 INJECTION INTRAVENOUS; SUBCUTANEOUS at 18:01

## 2021-03-24 RX ADMIN — DOCUSATE SODIUM 50 MG AND SENNOSIDES 8.6 MG 2 TABLET: 8.6; 5 TABLET, FILM COATED ORAL at 20:54

## 2021-03-24 RX ADMIN — POLYETHYLENE GLYCOL (3350) 17 G: 17 POWDER, FOR SOLUTION ORAL at 09:37

## 2021-03-24 RX ADMIN — Medication 250 MG: at 20:54

## 2021-03-24 RX ADMIN — CEFAZOLIN SODIUM 2 G: 10 INJECTION, POWDER, FOR SOLUTION INTRAVENOUS at 15:59

## 2021-03-24 RX ADMIN — GUAIFENESIN 600 MG: 600 TABLET, EXTENDED RELEASE ORAL at 09:37

## 2021-03-24 RX ADMIN — DOCUSATE SODIUM 100 MG: 100 CAPSULE ORAL at 20:54

## 2021-03-24 RX ADMIN — SODIUM CHLORIDE, PRESERVATIVE FREE 10 ML: 5 INJECTION INTRAVENOUS at 09:42

## 2021-03-24 RX ADMIN — FUROSEMIDE 40 MG: 40 TABLET ORAL at 09:36

## 2021-03-24 RX ADMIN — ACETAMINOPHEN 650 MG: 325 TABLET, FILM COATED ORAL at 22:29

## 2021-03-24 RX ADMIN — CARVEDILOL 12.5 MG: 6.25 TABLET, FILM COATED ORAL at 18:01

## 2021-03-24 RX ADMIN — ISOSORBIDE MONONITRATE 60 MG: 60 TABLET, EXTENDED RELEASE ORAL at 18:01

## 2021-03-24 RX ADMIN — GUAIFENESIN 600 MG: 600 TABLET, EXTENDED RELEASE ORAL at 20:55

## 2021-03-24 RX ADMIN — Medication 250 MG: at 09:37

## 2021-03-24 RX ADMIN — DOCUSATE SODIUM 50 MG AND SENNOSIDES 8.6 MG 2 TABLET: 8.6; 5 TABLET, FILM COATED ORAL at 09:37

## 2021-03-24 RX ADMIN — OXYCODONE HYDROCHLORIDE AND ACETAMINOPHEN 1 TABLET: 10; 325 TABLET ORAL at 20:55

## 2021-03-24 RX ADMIN — PANTOPRAZOLE SODIUM 40 MG: 40 TABLET, DELAYED RELEASE ORAL at 05:38

## 2021-03-24 RX ADMIN — VANCOMYCIN HYDROCHLORIDE 750 MG: 10 INJECTION, POWDER, LYOPHILIZED, FOR SOLUTION INTRAVENOUS at 10:31

## 2021-03-24 RX ADMIN — GABAPENTIN 100 MG: 100 CAPSULE ORAL at 09:42

## 2021-03-24 RX ADMIN — CARVEDILOL 12.5 MG: 6.25 TABLET, FILM COATED ORAL at 09:38

## 2021-03-24 RX ADMIN — DOCUSATE SODIUM 100 MG: 100 CAPSULE ORAL at 09:37

## 2021-03-24 RX ADMIN — LEVOTHYROXINE SODIUM 75 MCG: 75 TABLET ORAL at 05:38

## 2021-03-24 NOTE — THERAPY TREATMENT NOTE
Acute Care - Occupational Therapy Treatment Note  [facility]     Patient Name: Tawny Shin  : 1955  MRN: [id number]  Today's Date: 3/24/2021             Admit Date: 3/22/2021       ICD-10-CM ICD-9-CM   1. Discitis, unspecified, lumbar region  M46.46 722.93   2. Spinal stenosis, lumbar region, with neurogenic claudication  M48.062 724.03   3. Discitis of lumbar region  M46.46 722.93   4. Osteomyelitis of lumbar spine (CMS/HCC)  M46.26 730.28   5. Impaired mobility and ADLs  Z74.09 V49.89    Z78.9    6. Impaired mobility  Z74.09 799.89     Patient Active Problem List   Diagnosis   • Eustachian tube dysfunction   • Sensorineural hearing loss   • Lumbago of multiple sites in spine with sciatica   • Spondylolisthesis of lumbar region   • Coronary artery disease   • Hyperlipidemia   • Hypertension   • Myalgia due to statin   • S/P coronary artery stent placement   • Pacemaker   • Seropositive rheumatoid arthritis of multiple sites (CMS/HCC)   • Sick sinus syndrome (CMS/HCC)   • Other osteoporosis without current pathological fracture   • Allergic-infective asthma   • Arthritis of both knees   • Vasovagal syncope   • Bilateral bunions   • Bronchitis   • Cardiac pacemaker syndrome   • Charcot's joint of foot, left   • Constipation   • 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   • Intrinsic asthma   • Left carotid bruit   • Neutropenia (CMS/HCC)   • Obesity   • Primary osteoarthritis of left knee   • Psoriasis vulgaris   • Recurrent acute serous otitis media of both ears   • S/P lumbar laminectomy   • Thrombocytopenia (CMS/[facility])   • Hypothyroidism   • Vitamin D deficiency   • Myxedema heart disease   • Spondylolisthesis of lumbar region   • Syncope, cardiogenic   • Rupture of gluteus minimus tendon   • Muscle tear   • Syncope, recurrent   • Leukocytosis   • Headache   • Elevated CPK   • Staphylococcus aureus bacteremia   • Right hip pain, suspected infection   • Obesity (BMI 30-39.9)   • Stenosis of cervical spine with  myelopathy (CMS/[facility])   • Spinal stenosis, lumbar region, with neurogenic claudication   • Discitis of lumbar region   • Osteomyelitis of lumbar spine (CMS/[facility])   • Discitis, unspecified, lumbar region   • Iliopsoas abscess (CMS/[facility])     Past Medical History:   Diagnosis Date   • Arthritis    • Asthma    • Chronic sinusitis    • Coronary artery disease    • Eustachian tube dysfunction    • Heart disease    • Herpes simplex    • Hyperlipidemia    • Hypertension    • Knee dislocation    • Labral tear of right hip joint    • Laryngitis sicca    • Laryngitis, chronic    • MI (myocardial infarction) (CMS/[facility])    • Otorrhea    • Sensorineural hearing loss    • Sjogren's disease (CMS/[facility])      Past Surgical History:   Procedure Laterality Date   • A-V CARDIAC PACEMAKER INSERTION  2016   • ATRIAL CARDIAC PACEMAKER INSERTION     • CARDIAC CATHETERIZATION     • CATARACT EXTRACTION     • CERVICAL CORPECTOMY N/A 3/3/2021    Procedure: CERVICAL 6 CORPECTOMY WITH TITANIUM CAGE WITH NEURO MONITORING;  Surgeon: Bandar Shea MD;  Location:  [facility] OR;  Service: Neurosurgery;  Laterality: N/A;   • COLONOSCOPY  11/08/2011    One fold in the ascending colon which showed ulcer otherwise normal exam   • COLONOSCOPY  11/12/2004    Normal exam repeat in five years   • CORONARY ANGIOPLASTY WITH STENT PLACEMENT      X 2; 2013 & 2014   • ENDOSCOPY  07/10/2014    Normal exam   • HIP ABDUCTION TENOTOMY BILATERAL Right 1/14/2021    Procedure: RIGHT HIP GLUTEUS MEDLUS / MINIMUS REPAIR, POSSIBLE ACHILLES ALLOGRAFT;  Surgeon: Nino Carlson MD;  Location:  [facility] OR;  Service: Orthopedics;  Laterality: Right;   • INCISION AND DRAINAGE HIP Right 2/9/2021    Procedure: HIP INCISION AND DRAINAGE;  Surgeon: Nino Carlson MD;  Location:  [facility];  Service: Orthopedics;  Laterality: Right;   • JOINT REPLACEMENT     • LUMBAR DISCECTOMY Right 3/23/2021    Procedure: LUMBAR DISCECTOMY MICRO, Lumbar 1/2 right;  Surgeon: Bandar Shea MD;   "Location:  [facility] OR;  Service: Neurosurgery;  Laterality: Right;   • LUMBAR FUSION N/A 1/19/2018    Procedure: L3-4,L4-5 DECOMPRESSION, POSTERIOR SPINAL FUSION WITH INSTRUMENTATION;  Surgeon: Fortino Oropeza MD;  Location:  [facility] OR;  Service:    • LUMBAR LAMINECTOMY WITH FUSION Left 1/17/2018    Procedure: LEFT L3-4 L4-5 LATERAL LUMBAR INTERBODY FUSION;  Surgeon: Fortino Oropeza MD;  Location:  [facility];  Service:    • MYRINGOTOMY W/ TUBES  09/04/2014    TUBES NO LONGER IN PLACE   • OTHER SURGICAL HISTORY      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 so hardware was removed and spacers were placed   • REPLACEMENT TOTAL KNEE Right             OT ASSESSMENT FLOWSHEET (last 12 hours)      OT Evaluation and Treatment     Row Name 03/24/21 5840                   OT Time and Intention    Subjective Information  complains of;pain  -TS        Document Type  therapy note (daily note)  -TS        Mode of Treatment  occupational therapy  -TS        Comment  c collar with GUILLERMO drain, LSO, wound vac  -TS           General Information    General Observations of Patient  pitting edema in RLE/foot, RN informed  -TS        Existing Precautions/Restrictions  fall;spinal  -TS           Cognition    Personal Safety Interventions  fall prevention program maintained;gait belt;nonskid shoes/slippers when out of bed  -TS           Pain Scale: Numbers Pre/Post-Treatment    Pretreatment Pain Rating  2/10  -TS        Posttreatment Pain Rating  6/10  -TS        Pain Location - Side  Right  -TS        Pain Location - Orientation  -- \"wound vac\"  -TS        Pain Location  hip  -TS        Pain Intervention(s)  Repositioned  -TS           Transfer Assessment/Treatment    Transfers  sit-stand transfer;stand-sit transfer;toilet transfer;stand pivot/stand step transfer  -TS           Transfers    Sit-Stand [place] (Transfers)  contact guard  -TS        Stand-Sit [place] (Transfers)  contact guard  -TS        [place] Level (Toilet Transfer) "  standby assist  -TS        Assistive Device (Toilet Transfer)  commode, bedside without drop arms;walker, front-wheeled  -TS           Sit-Stand Transfer    Assistive Device (Sit-Stand Transfers)  walker, front-wheeled  -TS           Stand-Sit Transfer    Assistive Device (Stand-Sit Transfers)  walker, front-wheeled  -TS           Stand Pivot/Stand Step Transfer    Stand Pivot/Stand Step [place] (Transfers)  standby assist;contact guard  -TS        Assistive Device (Stand Pivot Stand Step Transfer)  walker, front-wheeled  -TS           Toilet Transfer    Type (Toilet Transfer)  sit-stand;stand-sit  -TS           Activities of Daily Living    BADL Assessment/Intervention  lower body dressing;toileting;upper body dressing  -TS           Upper Body Dressing Assessment/Training    [place] Level (Upper Body Dressing)  maximum assist (25% patient effort)  -TS        Position (Upper Body Dressing)  supported sitting  -TS        Comment (Upper Body Dressing)  c collar  -TS           Lower Body Dressing Assessment/Training    [place] Level (Lower Body Dressing)  don;doff;socks;maximum assist (25% patient effort)  -TS        Position (Lower Body Dressing)  unsupported sitting  -TS           Toileting Assessment/Training    [place] Level (Toileting)  toileting skills;independent;perform perineal hygiene;standby assist  -TS        Assistive Devices (Toileting)  commode, bedside without drop arms  -TS        Position (Toileting)  supported sitting;supported standing  -TS           Orthotics & Prosthetics Management    Orthosis Location  spinal orthosis  -TS        Additional Documentation  Orthosis Location (Row)  -TS           Spinal Orthosis Management    Type (Spinal Orthosis)  cervical collar, hard;LSO (lumbar sacral orthosis)  -TS        Wearing Schedule (Spinal Orthosis)  remove for hygiene/bathing;wear full-time;wear when out of bed only  -TS        Orthosis Training (Spinal Orthosis)   patient;orthosis adjustment;activity limitations/precautions;wearing schedule  -TS           Wound 03/03/21 1446 Right anterior neck Incision    Wound - Properties Group Placement Date: 03/03/21  -HW Placement Time: 1446  -HW Present on Hospital Admission: N  -HW Side: Right  -TANIKA Orientation: anterior  -HW Location: neck  -HW Primary Wound Type: Incision  -HW    Retired Wound - Properties Group Date first assessed: 03/03/21  -HW Time first assessed: 1446  -HW Present on Hospital Admission: N  -HW Side: Right  -TANIKA Location: neck  -HW Primary Wound Type: Incision  -HW       Wound 02/09/21 1715 Right hip Incision    Wound - Properties Group Placement Date: 02/09/21  -SH Placement Time: 1715  -SH Present on Hospital Admission: Y  -SH Side: Right  -SH Location: hip  -SH Primary Wound Type: Incision  -SH    Retired Wound - Properties Group Date first assessed: 02/09/21  -SH Time first assessed: 1715  -SH Present on Hospital Admission: Y  -SH Side: Right  -SH Location: hip  -SH Primary Wound Type: Incision  -SH       Wound 03/23/21 1021 lumbar spine Incision    Wound - Properties Group Placement Date: 03/23/21  -KINGS Placement Time: 1021  -KINGS Location: lumbar spine  -KINGS Primary Wound Type: Incision  -KINGS    Retired Wound - Properties Group Date first assessed: 03/23/21  -KINGS Time first assessed: 1021  -KINGS Location: lumbar spine  -KINGS Primary Wound Type: Incision  -KINGS       NPWT (Negative Pressure Wound Therapy) 02/09/21 1741 right hip    NPWT (Negative Pressure Wound Therapy) - Properties Group Placement Date: 02/09/21  -SH Placement Time: 1741 -SH Location: right hip  -SH Additional Comments: 1 pc of black sponge  -SH    Retired NPWT (Negative Pressure Wound Therapy) - Properties Group Placement Date: 02/09/21  -SH Placement Time: 1741 -SH Location: right hip  -SH Additional Comments: 1 pc of black sponge  -SH       Plan of Care Review    Plan of Care Reviewed With  patient  -TS        Progress  improving  -TS        Outcome  Summary  Pt c/o pain in R hip around wound vac, RN aware. Pt t/fs with SBA/CGA with RW. Pt completed toileting with SBA. Pt max A to don socks and adjust c collar. Pt would benefit from acute rehab at discharge. Continue OT POC   -TS           Positioning and Restraints    Pre-Treatment Position  sitting in chair/recliner  -TS        Post Treatment Position  chair  -TS        In Chair  reclined;call light within reach;encouraged to call for assist;with brace  -TS          User Key  (r) = Recorded By, (t) = Taken By, (c) = Cosigned By    Initials Name Effective Dates    TS Carolee Giordano, CONTI/L 08/02/16 -     Ewelina Eubanks RN 08/02/16 -     Esequiel Roche, RN 08/02/16 -     Laurie Ritter RN 01/02/19 -     Benjie Melendez RN 06/05/20 -          Occupational Therapy Education                 Title: PT OT SLP Therapies (In Progress)     Topic: Occupational Therapy (In Progress)     Point: ADL training (Done)     Description:   Instruct learner(s) on proper safety adaptation and remediation techniques during self care or transfers.   Instruct in proper use of assistive devices.              Learning Progress Summary           Patient Acceptance, E, VU by QUANG at 3/23/2021 1453   Family Acceptance, E, VU by JJ at 3/23/2021 1453                   Point: Home exercise program (Not Started)     Description:   Instruct learner(s) on appropriate technique for monitoring, assisting and/or progressing therapeutic exercises/activities.              Learner Progress:  Not documented in this visit.          Point: Precautions (Done)     Description:   Instruct learner(s) on prescribed precautions during self-care and functional transfers.              Learning Progress Summary           Patient Acceptance, E, VU by JMARKIE at 3/23/2021 1453   Family Acceptance, E, VU by JJ at 3/23/2021 1453                   Point: Body mechanics (Done)     Description:   Instruct learner(s) on proper positioning and spine  alignment during self-care, functional mobility activities and/or exercises.              Learning Progress Summary           Patient Acceptance, E, VU by QUANG at 3/23/2021 1453   Family Acceptance, E, VU by QUANG at 3/23/2021 1453                               User Key     Initials Effective Dates Name Provider Type Discipline    QUANG 12/04/20 -  [facility] OTR/L Occupational Therapist OT                  OT Recommendation and Plan     Plan of Care Review  Plan of Care Reviewed With: patient  Progress: improving  Outcome Summary: Pt c/o pain in R hip around wound vac, RN aware. Pt t/fs with SBA/CGA with RW. Pt completed toileting with SBA. Pt max A to don socks and adjust c collar. Pt would benefit from acute rehab at discharge. Continue OT POC   Plan of Care Reviewed With: patient  Outcome Summary: Pt c/o pain in R hip around wound vac, RN aware. Pt t/fs with SBA/CGA with RW. Pt completed toileting with SBA. Pt max A to don socks and adjust c collar. Pt would benefit from acute rehab at discharge. Continue OT POC     Outcome Measures     Row Name 03/24/21 1400             How much help from another is currently needed...    Putting on and taking off regular lower body clothing?  2  -TS      Bathing (including washing, rinsing, and drying)  2  -TS      Toileting (which includes using toilet bed pan or urinal)  3  -TS      Putting on and taking off regular upper body clothing  3  -TS      Taking care of personal grooming (such as brushing teeth)  3  -TS      Eating meals  4  -TS      AM-PAC 6 Clicks Score (OT)  17  -TS        User Key  (r) = Recorded By, (t) = Taken By, (c) = Cosigned By    Initials Name Provider Type    TS Carolee Giordano, CONTI/L Occupational Therapy Assistant          Time Calculation:   Time Calculation- OT     Row Name 03/24/21 1448 03/24/21 1023          Time Calculation- OT    OT Start Time  1340  -TS  --     OT Stop Time  1420  -TS  --     OT Time Calculation (min)  40 min  -TS  --      Total Timed Code Minutes- OT  40 minute(s)  -TS  --     OT Received On  03/24/21  -  --        Timed Charges    09282 - Gait Training Minutes   --  15  -     01366 - OT Self Care/Mgmt Minutes  40  -TS  --       User Key  (r) = Recorded By, (t) = Taken By, (c) = Cosigned By    Initials Name Provider Type     Olga Chambers, PTA Physical Therapy Assistant     Carolee Giordano COTA/L Occupational Therapy Assistant        Therapy Charges for Today     Code Description Service Date Service Provider Modifiers Qty    [id number]  OT SELF CARE/MGMT/TRAIN EA 15 MIN 3/24/2021 Carolee Giordano COTA/L GO 3               Carolee NIELSON. GUALBERTO Giordano  3/24/2021

## 2021-03-24 NOTE — DISCHARGE PLACEMENT REQUEST
"Laura Shin (65 y.o. Female)     Date of Birth Social Security Number Address Home Phone MRN    1955  [id number] [address] [phone] [id number]    [employer] Marital Status          Nondenominational        Admission Date Admission Type Admitting Provider Attending Provider Department, Room/Bed    3/22/21 Urgent Bandar Shea MD [place], William Lee, MD [facility] 3A, 357/1    Discharge Date Discharge Disposition Discharge Destination                       Attending Provider: Bandar Shea MD    Allergies: Atorvastatin, Amoxicillin, Escitalopram, Nabumetone, Niacin Er, Penicillins, Simvastatin    Isolation: None   Infection: None   Code Status: CPR    Ht: 165.1 cm (65\")   Wt: 104 kg (229 lb 12.8 oz)    Admission Cmt: None   Principal Problem: None                Active Insurance as of 3/22/2021     Primary Coverage     Payor Plan Insurance Group Employer/Plan Group    MEDICARE MEDICARE A & B      Payor Plan Address Payor Plan Phone Number Payor Plan Fax Number Effective Dates    [address] [phone]  3/1/2014 - None Entered    [facility] [id number]       Subscriber Name Subscriber Birth Date Member ID       LAURA SHIN 1955 [id number]           Secondary Coverage     Payor Plan Insurance Group Employer/Plan Group    AARP MC SUP AARP HEALTH CARE OPTIONS      Payor Plan Address Payor Plan Phone Number Payor Plan Fax Number Effective Dates    [facility] [phone]  1/1/2019 - None Entered    [address]       [facility] [address]       Subscriber Name Subscriber Birth Date Member ID       LAURA SHIN 1955 [id number]                 Emergency Contacts      (Rel.) Home Phone Work Phone Mobile Phone    Gee Shin (Son) -- [phone] --            Insurance Information                MEDICARE/MEDICARE A & B Phone: [phone]    Subscriber: Laura Shin Subscriber#: [id number]    Group#:  Precert#:         " ERIC APODACA [facility] HEALTH CARE OPTIONS Phone: [phone]    Subscriber: Tawny Shin Subscriber#: [id number]    Group#:  Precert#:

## 2021-03-24 NOTE — PROGRESS NOTES
[facility]  INPATIENT PROGRESS NOTE    Patient Name: Tawny Shin  Date of Admission: 3/22/2021  Today's Date: 03/24/21  Length of Stay: 2  Primary Care Physician: Davon Urrutia MD    Subjective   Chief Complaint: back pain  HPI     Patient seen and examined at bedside.  Patient sitting up in chair.  Pain controlled.  Has not had a BM since Sunday.  Awaiting rehab placement.      Review of Systems   Constitutional: Negative for activity change, chills, fatigue and fever.   Respiratory: Negative for cough and shortness of breath.    Cardiovascular: Negative for chest pain and palpitations.   Gastrointestinal: Negative for abdominal distention, abdominal pain, constipation, diarrhea, nausea and vomiting.        All pertinent negatives and positives are as above. All other systems have been reviewed and are negative unless otherwise stated.     Objective    Temp:  [97.7 °F (36.5 °C)-98.8 °F (37.1 °C)] 97.9 °F (36.6 °C)  Heart Rate:  [67-81] 78  Resp:  [16-18] 18  BP: (131-163)/(50-66) 152/51  Physical Exam  Vitals and nursing note reviewed.   Constitutional:       Appearance: She is obese.      Comments: Sitting up in the chair   HENT:      Head: Normocephalic and atraumatic.      Nose: No congestion or rhinorrhea.      Mouth/Throat:      Mouth: Mucous membranes are dry.      Pharynx: Oropharynx is clear.   Eyes:      General: No scleral icterus.     Conjunctiva/sclera: Conjunctivae normal.   Neck:      Comments: C collar   Cardiovascular:      Rate and Rhythm: Normal rate and regular rhythm.   Pulmonary:      Effort: Pulmonary effort is normal. No respiratory distress.      Breath sounds: Normal breath sounds. No stridor. No wheezing or rhonchi.   Abdominal:      General: Abdomen is flat. Bowel sounds are normal. There is no distension.      Palpations: Abdomen is soft. There is no mass.   Skin:     General: Skin is warm and dry.      Coloration: Skin is not  jaundiced or pale.   Neurological:      General: No focal deficit present.      Mental Status: She is alert and oriented to person, place, and time.   Psychiatric:         Mood and Affect: Mood normal.         Behavior: Behavior normal.             Results Review:  I have reviewed the labs, radiology results, and diagnostic studies.    Laboratory Data:   Results from last 7 days   Lab Units 03/24/21  0422 03/23/21  0420 03/22/21  1516   WBC 10*3/mm3 4.71 5.41 6.02   HEMOGLOBIN g/dL 8.7* 9.2* 10.3*   HEMATOCRIT % 28.3* 29.4* 32.9*   PLATELETS 10*3/mm3 218 210 230        Results from last 7 days   Lab Units 03/24/21  0422 03/23/21  0420 03/22/21  1516   SODIUM mmol/L 138 137 138   POTASSIUM mmol/L 4.5 4.0 4.1   CHLORIDE mmol/L 104 100 98   CO2 mmol/L 26.0 27.0 30.0*   BUN mg/dL 8 7* 7*   CREATININE mg/dL 0.32* 0.35* 0.38*   CALCIUM mg/dL 8.4* 7.9* 8.8   BILIRUBIN mg/dL 0.3 0.4 0.5   ALK PHOS U/L 78 88 98   ALT (SGPT) U/L <5 <5 <5   AST (SGOT) U/L 19 17 16   GLUCOSE mg/dL 79 78 113*       Culture Data:   Wound Culture   Date Value Ref Range Status   03/23/2021 No growth  Preliminary       Radiology Data:   Imaging Results (Last 24 Hours)     ** No results found for the last 24 hours. **          I have reviewed the patient's current medications.     Assessment/Plan     Active Hospital Problems    Diagnosis    • Discitis, unspecified, lumbar region    • Iliopsoas abscess (CMS/HCC)    • Stenosis of cervical spine with myelopathy (CMS/HCC)      Added automatically from request for surgery [id number]     • Spinal stenosis, lumbar region, with neurogenic claudication      Added automatically from request for surgery [id number]     • Osteomyelitis of lumbar spine (CMS/HCC)      Added automatically from request for surgery [id number]     • Discitis of lumbar region      Added automatically from request for surgery [id number]     • Staphylococcus aureus bacteremia    • Obesity (BMI 30-39.9)    • Cardiac pacemaker syndrome      Overview:  "  - heart block  - implanted 11/16     • Seropositive rheumatoid arthritis of multiple sites (CMS/[facility])      Overview:   -myochrysine '93-'96  -methotrexate '96--->11/98;r/s  restarted 2/99--> 8/14 (anemia)  -sulfasalazine- not effective  -penicillamine 6/98-->10/98; no effect  -leflunomide 11/98-->  - Humira '13-->didn't take  - Enbrel 12/14-->3/15- no effect!      Last Assessment & Plan:   - \"aching all over\" because she had to be off her anti-rheumatic drugs for 2 weeks in preparation for her R knee surgery  - her R knee surgery got cancelled because she developed cellulitis of her L arm (with fever). She was started on Bactrim, and then transitioned to doxycyline on Aug 1 2018 (3 week course) and her R knee surgery was postponed to 1 month from now.   - As such, she restarted her anti-rheumatics on Aug 1 2018.  - She will have to hold her anti-rheumatics again 2 weeks before her surgery  - She feels her medications are otherwise working well. No changes to be made to her current regimen, which is Humira injection q2 week, leflunomide (Arava), and salicylate     - Pt is concerned that when she transitions insurance in December of this year, she will lose her salicylate. We had a discussion and expressed that it would be best for her to stay on salicylate because we do not want the blood thinning effects of aspirin. Dr. Walden will plan to write a prior authorization for her for salicylate.     • Coronary artery disease      PCI RCA '10, NSTEMI requiring LAD PCI in '13    [facility] 9/3/19 at [facility]: no significant dz; stents patent     • S/P coronary artery stent placement        Plan:  1.  Continue antibiotics per ID  2.  Tolerated procedure.  GUILLERMO in place.  Wound culture pending.  Concern for abscess noted during procedure.     3.  ECG reviewed.  Normal sinus rhythm; normal rate; left axis deviation; no significant ST changes  4.  PT/OT following surgery for assistance with disposition  5.  Resume home medications as " appropriate  6.  Incentive spirometer  7.  Pain control per neurosurgery   8.  Bowel regimen  9.  Heparin for VTE PPx  10.  Hold RA medications.                 Discharge Planning: I expect the patient to be discharged per attending.    Electronically signed by Moises Oliveira MD, 03/24/21, 15:57 CDT.

## 2021-03-24 NOTE — THERAPY TREATMENT NOTE
"Acute Care - Physical Therapy Treatment Note  [facility]     Patient Name: Tawny Shin  : 1955  MRN: [id number]  Today's Date: 3/24/2021           PT Assessment (last 12 hours)      PT Evaluation and Treatment     [facility] Name 21 09          Physical Therapy Time and Intention    Subjective Information  complains of;weakness;fatigue;pain  -     Document Type  therapy note (daily note)  -     Mode of Treatment  physical therapy  -[facility] Name 21          General Information    Existing Precautions/Restrictions  fall;spinal C-collar AAT, LSO,[place],RLE WBAT, GUILLERMO drain  -     Row Name 21          Pain Scale: Numbers Pre/Post-Treatment    Pretreatment Pain Rating  4/10  -     Posttreatment Pain Rating  4/10  -     Pain Location - Side  Right  -     Pain Location  hip \"wound vac\"  -[facility] Name 21          Pain Scale: Word Pre/Post-Treatment    Pain: Word Scale, Pretreatment  0 - no pain at rest  -     Posttreatment Pain Rating  4 - moderate pain while standing  -     Pain Location  chest  -     Pain Intervention(s)  Repositioned;Ambulation/increased activity;MD notified (Comment) RN aware  -     Row Name 21          Bed Mobility    Sidelying-Sit [place] (Bed Mobility)  minimum assist (75% patient effort);verbal cues  -     Sit-Sidelying [place] (Bed Mobility)  -- chair  -[facility] Name 21          Transfers    Sit-Stand [place] (Transfers)  contact guard;minimum assist (75% patient effort);verbal cues  -     Stand-Sit [place] (Transfers)  contact guard;verbal cues  -     [place] Level (Toilet Transfer)  contact guard;verbal cues  -     Assistive Device (Toilet Transfer)  commode, bedside without drop arms;walker, front-wheeled  -     Row Name 21          Gait/Stairs (Locomotion)    [place] Level (Gait)  contact guard;verbal cues  -     Assistive Device (Gait)  walker, " front-wheeled  -AH     Distance in Feet (Gait)  10  -AH     Row Name             Wound 03/03/21 1446 Right anterior neck Incision    Wound - Properties Group Placement Date: 03/03/21  -HW Placement Time: 1446  -HW Present on Hospital Admission: N  -HW Side: Right  -TANIAK Orientation: anterior  -HW Location: neck  -HW Primary Wound Type: Incision  -HW    Retired Wound - Properties Group Date first assessed: 03/03/21  -HW Time first assessed: 1446  -HW Present on Hospital Admission: N  -HW Side: Right  -TANIKA Location: neck  -HW Primary Wound Type: Incision  -HW    Row Name 03/24/21 0940          Wound 02/09/21 1715 Right hip Incision    Wound - Properties Group Placement Date: 02/09/21  -SH Placement Time: 1715 -SH Present on Hospital Admission: Y  -SH Side: Right  -SH Location: hip  -SH Primary Wound Type: Incision  -SH    Dressing Appearance  intact  -AH     Care, Wound  negative pressure wound therapy  -AH     Wound Output (mL)  50 gel pack busted  -AH     Retired Wound - Properties Group Date first assessed: 02/09/21  -SH Time first assessed: 1715  -SH Present on Hospital Admission: Y  -SH Side: Right  -SH Location: hip  -SH Primary Wound Type: Incision  -SH    Row Name             Wound 03/23/21 1021 lumbar spine Incision    Wound - Properties Group Placement Date: 03/23/21  -KINGS Placement Time: 1021  -KINGS Location: lumbar spine  -KINGS Primary Wound Type: Incision  -KINGS    Retired Wound - Properties Group Date first assessed: 03/23/21  -KINGS Time first assessed: 1021  -KINGS Location: lumbar spine  -KINGS Primary Wound Type: Incision  -KINGS    Row Name 03/24/21 0940          NPWT (Negative Pressure Wound Therapy) 02/09/21 1741 right hip    NPWT (Negative Pressure Wound Therapy) - Properties Group Placement Date: 02/09/21  -SH Placement Time: 1741  -SH Location: right hip  -SH Additional Comments: 1 pc of black sponge  -SH    Therapy Setting  continuous therapy  -AH     Instillation  normal saline  -AH     Pressure Setting  125 mmHg   -     Retired NPWT (Negative Pressure Wound Therapy) - Properties Group Placement Date: 02/09/21  - Placement Time: 1741 -SH Location: right hip  - Additional Comments: 1 pc of black sponge  -    Row Name 03/24/21 0940          Plan of Care Review    Plan of Care Reviewed With  patient  -     Progress  improving  -     Outcome Summary  pt trans to EOB min assist, min assist to abilio LSO, sit-stand cga-min assist, trans to BSC CGA, amb 10 feet cga rwx, trans to chair cga, performed BLE exercises, wound vac dressing intact, will cont to check daily, plan to change vac dressing Friday or PRN. pt would benefit from rehab  -     Row Name 03/24/21 0940          Positioning and Restraints    Pre-Treatment Position  in bed  -     Post Treatment Position  chair  -     In Chair  reclined;call light within reach;encouraged to call for assist;with family/caregiver;notified [facility]  -       User Key  (r) = Recorded By, (t) = Taken By, (c) = Cosigned By    Initials Name Provider Type     Olga Chambers, PTA Physical Therapy Assistant    Ewelina Eubanks, RN Registered Nurse    Esequiel Roche, RN Registered Nurse    SH Laurie Herrera, RN Registered Nurse    Benjie Melendez, RN Registered Nurse        Physical Therapy Education                 Title: PT OT SLP Therapies (In Progress)     Topic: Physical Therapy (In Progress)     Point: Mobility training (Done)     Learning Progress Summary           Patient Acceptance, E,TB, VU by  at 3/24/2021 1023    Comment: trans    Acceptance, E, VU,NR by  at 3/23/2021 1603    Comment: pt edu on POC, benefits of act, dc plans, LSO brace, spinal precautions, purpose of NPWT   Family Acceptance, E, VU,NR by  at 3/23/2021 1603    Comment: pt edu on POC, benefits of act, dc plans, LSO brace, spinal precautions, purpose of NPWT                   Point: Home exercise program (Not Started)     Learner Progress:  Not documented in this visit.          Point: Body  mechanics (Done)     Learning Progress Summary           Patient Acceptance, E, VU,NR by SB at 3/23/2021 1603    Comment: pt edu on POC, benefits of act, dc plans, LSO brace, spinal precautions, purpose of NPWT   Family Acceptance, E, VU,NR by SB at 3/23/2021 1603    Comment: pt edu on POC, benefits of act, dc plans, LSO brace, spinal precautions, purpose of NPWT                   Point: Precautions (Done)     Learning Progress Summary           Patient Acceptance, E, VU,NR by SB at 3/23/2021 1603    Comment: pt edu on POC, benefits of act, dc plans, LSO brace, spinal precautions, purpose of NPWT   Family Acceptance, E, VU,NR by SB at 3/23/2021 1603    Comment: pt edu on POC, benefits of act, dc plans, LSO brace, spinal precautions, purpose of NPWT                               User Key     Initials Effective Dates Name Provider Type [facility] 08/02/16 -  Olga Chambers PTA Physical Therapy Assistant PT     10/31/19 -  Alyssa Gallardo, PT DPT Physical Therapist PT              PT Recommendation and Plan     Plan of Care Reviewed With: patient  Progress: improving  Outcome Summary: pt trans to EOB min assist, min assist to abilio LSO, sit-stand cga-min assist, trans to BSC CGA, amb 10 feet cga rwx, trans to chair cga, performed BLE exercises, wound vac dressing intact, will cont to check daily, plan to change vac dressing Friday or PRN. pt would benefit from rehab       Time Calculation:   PT Charges     Row Name 03/24/21 1023             Time Calculation    Start Time  0940  -      Stop Time  1010  -      Time Calculation (min)  30 min  -      PT Received On  03/24/21  -         Time Calculation- PT    Total Timed Code Minutes- PT  30 minute(s)  -         Timed Charges    73491 - PT Therapeutic Exercise Minutes  15  -      65254 - Gait Training Minutes   15  -        User Key  (r) = Recorded By, (t) = Taken By, (c) = Cosigned By    Initials Name Provider Type     Olga Chambers, SERENA Physical  Therapy Assistant        Therapy Charges for Today     Code Description Service Date Service Provider Modifiers Qty    71271533999 HC PT THER PROC EA 15 MIN 3/24/2021 Olga Chambers, PTA GP 1    64509402595 HC GAIT TRAINING EA 15 MIN 3/24/2021 Olga Chambers, PTA GP 1          PT G-Codes  Outcome Measure Options: AM-PAC 6 Clicks Daily Activity (OT)  AM-PAC 6 Clicks Score (PT): 17  AM-PAC 6 Clicks Score (OT): 14    Olga Chambers PTA  3/24/2021

## 2021-03-24 NOTE — PROGRESS NOTES
Continued Stay Note   [place]     Patient Name: Tawny Shin  MRN: [id number]  Today's Date: 3/24/2021    Admit Date: 3/22/2021    Discharge Plan     Row Name 03/24/21 1243       Plan    Plan Comments  Referral being sent to [facility] acute rehab per family request. Await answer.        Discharge Codes    No documentation.             OSIEL Pizano

## 2021-03-24 NOTE — THERAPY TREATMENT NOTE
"Acute Care - Physical Therapy Treatment Note  [facility]     Patient Name: Tawny Shin  : 1955  MRN: [id number]  Today's Date: 3/24/2021           PT Assessment (last 12 hours)      PT Evaluation and Treatment     Row Name 21 1436 21 0940       Physical Therapy Time and Intention    Subjective Information  complains of;weakness;fatigue;pain  -  complains of;weakness;fatigue;pain  -    Document Type  therapy note (daily note)  -  therapy note (daily note)  -    Mode of Treatment  physical therapy  -  physical therapy  -    Row Name 21 1436 21 0940       General Information    Existing Precautions/Restrictions  fall;spinal C-collar AAT, LSO,[place],RLE WBAT, GUILLERMO drain  -  fall;spinal C-collar AAT, LSO,WV,RLE WBAT, GUILLERMO drain  -    Row Name 21 1436 21 0940       Pain Scale: Numbers Pre/Post-Treatment    Pretreatment Pain Rating  4/10  -  4/10  -    Posttreatment Pain Rating  4/10  -  4/10  -    Pain Location - Side  Right  -  Right  -    Pain Location - Orientation  incisional  -  --    Pain Location  hip  -  hip \"wound vac\"  -    Row Name 21 14321 0940       Pain Scale: Word Pre/Post-Treatment    Pain: Word Scale, Pretreatment  0 - no pain at rest  -  0 - no pain at rest  -    Posttreatment Pain Rating  2 - mild pain while standing  -  4 - moderate pain while standing  -    Pain Location  chest  -  chest  -    Pain Intervention(s)  Repositioned;Ambulation/increased activity  -  Repositioned;Ambulation/increased activity;MD notified (Comment) RN aware  -    Row Name 21 1436 21 0940       Bed Mobility    Sidelying-Sit [place] (Bed Mobility)  -- chair  -  minimum assist (75% patient effort);verbal cues  -    Sit-Sidelying [place] (Bed Mobility)  minimum assist (75% patient effort);verbal cues  -  -- chair  -    Row Name 21 1436 21 0940       Transfers    Sit-Stand [place] " (Transfers)  contact guard;minimum assist (75% patient effort);verbal cues  -AH  contact guard;minimum assist (75% patient effort);verbal cues  -AH    Stand-Sit [place] (Transfers)  contact guard;verbal cues  -AH  contact guard;verbal cues  -AH    [place] Level (Toilet Transfer)  contact guard;verbal cues  -AH  contact guard;verbal cues  -AH    Assistive Device (Toilet Transfer)  commode, bedside without drop arms;walker, front-wheeled  -AH  commode, bedside without drop arms;walker, front-wheeled  -AH    Row Name 03/24/21 1436 03/24/21 0940       Gait/Stairs (Locomotion)    [place] Level (Gait)  contact guard;verbal cues  -AH  contact guard;verbal cues  -AH    Assistive Device (Gait)  walker, front-wheeled  -  walker, front-wheeled  -AH    Distance in Feet (Gait)  10  -AH  10  -AH    Row Name             Wound 03/03/21 1446 Right anterior neck Incision    Wound - Properties Group Placement Date: 03/03/21  -HW Placement Time: 1446  -HW Present on Hospital Admission: N  -HW Side: Right  -TANIKA Orientation: anterior  -HW Location: neck  -HW Primary Wound Type: Incision  -HW    Retired Wound - Properties Group Date first assessed: 03/03/21  -HW Time first assessed: 1446  -HW Present on Hospital Admission: N  -HW Side: Right  -TANIKA Location: neck  -HW Primary Wound Type: Incision  -HW    Row Name 03/24/21 0940          Wound 02/09/21 1715 Right hip Incision    Wound - Properties Group Placement Date: 02/09/21  -SH Placement Time: 1715  -SH Present on Hospital Admission: Y  -SH Side: Right  -SH Location: hip  -SH Primary Wound Type: Incision  -SH    Dressing Appearance  intact  -     Care, Wound  negative pressure wound therapy  -AH     Wound Output (mL)  50 gel pack busted  -AH     Retired Wound - Properties Group Date first assessed: 02/09/21  -SH Time first assessed: 1715 -SH Present on Hospital Admission: Y  -SH Side: Right  -SH Location: hip  -SH Primary Wound Type: Incision  -SH    Row Name              Wound 03/23/21 1021 lumbar spine Incision    Wound - Properties Group Placement Date: 03/23/21  -KINGS Placement Time: 1021  -KINGS Location: lumbar spine  -KINGS Primary Wound Type: Incision  -KINGS    Retired Wound - Properties Group Date first assessed: 03/23/21  -KINGS Time first assessed: 1021 -KINGS Location: lumbar spine  -KINGS Primary Wound Type: Incision  -KINGS    Row Name 03/24/21 0940          NPWT (Negative Pressure Wound Therapy) 02/09/21 1741 right hip    NPWT (Negative Pressure Wound Therapy) - Properties Group Placement Date: 02/09/21  -SH Placement Time: 1741 -SH Location: right hip  -SH Additional Comments: 1 pc of black sponge  -    Therapy Setting  continuous therapy  -     Instillation  normal saline  -     Pressure Setting  125 mmHg  -     Retired NPWT (Negative Pressure Wound Therapy) - Properties Group Placement Date: 02/09/21 -SH Placement Time: 1741 -SH Location: right hip  -SH Additional Comments: 1 pc of black sponge  -SH    Row Name 03/24/21 0940          Plan of Care Review    Plan of Care Reviewed With  patient  -     Progress  improving  -     Outcome Summary  pt trans to EOB min assist, min assist to abilio LSO, sit-stand cga-min assist, trans to BSC CGA, amb 10 feet cga rwx, trans to chair cga, performed BLE exercises, wound vac dressing intact, will cont to check daily, plan to change vac dressing Friday or PRN. pt would benefit from rehab  -     Row Name 03/24/21 1436 03/24/21 0940       Positioning and Restraints    Pre-Treatment Position  sitting in chair/recliner  -  in bed  -    Post Treatment Position  bed  -  chair  -    In Bed  side lying left;fowlers;call light within reach;encouraged to call for assist;exit alarm on;SCD pump applied  -  --    In Chair  --  reclined;call light within reach;encouraged to call for assist;with family/caregiver;notified [facility]      User Key  (r) = Recorded By, (t) = Taken By, (c) = Cosigned By    Initials Name Provider Type      Olga Chambers, SERENA Physical Therapy Assistant    Ewelina Eubanks RN Registered Nurse    Esequiel Roche, RN Registered Nurse    Laurie Ritter, RN Registered Nurse    Benjie Melendez, RN Registered Nurse        Physical Therapy Education                 Title: PT OT SLP Therapies (In Progress)     Topic: Physical Therapy (In Progress)     Point: Mobility training (Done)     Learning Progress Summary           Patient Acceptance, E,TB, VU by  at 3/24/2021 1023    Comment: trans    Acceptance, E, VU,NR by SB at 3/23/2021 1603    Comment: pt edu on POC, benefits of act, dc plans, LSO brace, spinal precautions, purpose of NPWT   Family Acceptance, E, VU,NR by SB at 3/23/2021 1603    Comment: pt edu on POC, benefits of act, dc plans, LSO brace, spinal precautions, purpose of NPWT                   Point: Home exercise program (Not Started)     Learner Progress:  Not documented in this visit.          Point: Body mechanics (Done)     Learning Progress Summary           Patient Acceptance, E, VU,NR by SB at 3/23/2021 1603    Comment: pt edu on POC, benefits of act, dc plans, LSO brace, spinal precautions, purpose of NPWT   Family Acceptance, E, VU,NR by SB at 3/23/2021 1603    Comment: pt edu on POC, benefits of act, dc plans, LSO brace, spinal precautions, purpose of NPWT                   Point: Precautions (Done)     Learning Progress Summary           Patient Acceptance, E, VU,NR by SB at 3/23/2021 1603    Comment: pt edu on POC, benefits of act, dc plans, LSO brace, spinal precautions, purpose of NPWT   Family Acceptance, E, VU,NR by SB at 3/23/2021 1603    Comment: pt edu on POC, benefits of act, dc plans, LSO brace, spinal precautions, purpose of NPWT                               User Key     Initials Effective Dates Name Provider Type Discipline     08/02/16 -  Olga Chambers PTA Physical Therapy Assistant PT    SB 10/31/19 -  Alyssa Gallardo PT DPT Physical Therapist PT              PT  Recommendation and Plan     Plan of Care Reviewed With: patient  Progress: improving  Outcome Summary: pt trans to EOB min assist, min assist to abilio LSO, sit-stand cga-min assist, trans to BSC CGA, amb 10 feet cga rwx, trans to chair cga, performed BLE exercises, wound vac dressing intact, will cont to check daily, plan to change vac dressing Friday or PRN. pt would benefit from rehab  Outcome Measures     Row Name 03/24/21 1400             How much help from another is currently needed...    Putting on and taking off regular lower body clothing?  2  -TS      Bathing (including washing, rinsing, and drying)  2  -TS      Toileting (which includes using toilet bed pan or urinal)  3  -TS      Putting on and taking off regular upper body clothing  3  -TS      Taking care of personal grooming (such as brushing teeth)  3  -TS      Eating meals  4  -TS      AM-PAC 6 Clicks Score (OT)  17  -TS        User Key  (r) = Recorded By, (t) = Taken By, (c) = Cosigned By    Initials Name Provider Type     Carolee Giordano COTA/L Occupational Therapy Assistant           Time Calculation:   PT Charges     Row Name 03/24/21 1502 03/24/21 1023          Time Calculation    Start Time  1436  -  0940  -     Stop Time  1500  -AH  1010  -     Time Calculation (min)  24 min  -  30 min  -     PT Received On  --  03/24/21  -        Time Calculation- PT    Total Timed Code Minutes- PT  24 minute(s)  -  30 minute(s)  -        Timed Charges    14319 - PT Therapeutic Exercise Minutes  --  15  -AH     99454 - Gait Training Minutes   24  -AH  15  -AH       User Key  (r) = Recorded By, (t) = Taken By, (c) = Cosigned By    Initials Name Provider Type     Olga Chambers PTA Physical Therapy Assistant        Therapy Charges for Today     Code Description Service Date Service Provider Modifiers Qty    00266767235 HC PT THER PROC EA 15 MIN 3/24/2021 Olga Chambers PTA GP 1    58157952303 HC GAIT TRAINING EA 15 MIN 3/24/2021  Olga Chambers, PTA GP 1    26209740270 HC GAIT TRAINING EA 15 MIN 3/24/2021 Olga Chambers, SERENA GP 2          PT G-Codes  Outcome Measure Options: AM-PAC 6 Clicks Daily Activity (OT)  AM-PAC 6 Clicks Score (PT): 17  AM-PAC 6 Clicks Score (OT): 17    Olga Chambers PTA  3/24/2021

## 2021-03-24 NOTE — PLAN OF CARE
Problem: Adult Inpatient Plan of Care  Goal: Plan of Care Review  Outcome: Ongoing, Progressing  Flowsheets (Taken 3/24/2021 8064)  Progress: no change  Plan of Care Reviewed With: patient  Outcome Summary: PT A&Ox4, c/o mild pain when moving around. Rt peripheral IV infiltrated, removed and started fluids back on the midline. SCD's for VTE prevention. BP was more controlled during the shift, did not need PRN BP medication. VSS, voiding in bedpan, safety maintained.

## 2021-03-24 NOTE — PROGRESS NOTES
NEUROSURGERY DAILY PROGRESS NOTE    ASSESSMENT:   Tawny Shin is a 65 y.o. with a significant comorbidity rheumatoid arthritis on disease modifying agents, prior lumbar fusion by Dr. Oropeza in 2018.  She presents with a new problem of postop surgical infection of her right hip with wound VAC dressing, progressive right lower extremity proximal weakness without numbness or radiculopathy. Physical exam findings of right iliopsoas weakness, and global hyperreflexia with Babinski and right Patience's and decreased  strength bilaterally.  Their imaging shows CT of the cervical spine with severe cervical stenosis at severe stenosis C5-6 and C6/7 and adjacent segment disease at L1/2 and L2/3 with concern for osteomyelitis discitis.    Past Medical History:   Diagnosis Date   • Arthritis    • Asthma    • Chronic sinusitis    • Coronary artery disease    • Eustachian tube dysfunction    • Heart disease    • Herpes simplex    • Hyperlipidemia    • Hypertension    • Knee dislocation    • Labral tear of right hip joint    • Laryngitis sicca    • Laryngitis, chronic    • MI (myocardial infarction) (CMS/HCC)    • Otorrhea    • Sensorineural hearing loss    • Sjogren's disease (CMS/HCC)      Active Hospital Problems    Diagnosis    • Discitis, unspecified, lumbar region    • Iliopsoas abscess (CMS/HCC)    • Stenosis of cervical spine with myelopathy (CMS/[facility])      Added automatically from request for surgery [id number]     • Spinal stenosis, lumbar region, with neurogenic claudication      Added automatically from request for surgery [id number]     • Osteomyelitis of lumbar spine (CMS/HCC)      Added automatically from request for surgery [id number]     • Discitis of lumbar region      Added automatically from request for surgery [id number]     • Staphylococcus aureus bacteremia    • Obesity (BMI 30-39.9)    • Cardiac pacemaker syndrome      Overview:   - heart block  - implanted 11/16     • Seropositive rheumatoid arthritis of multiple  "sites (CMS/[facility])      Overview:   -myochrysine '93-'96  -methotrexate '96--->11/98;r/s  restarted 2/99--> 8/14 (anemia)  -sulfasalazine- not effective  -penicillamine 6/98-->10/98; no effect  -leflunomide 11/98-->  - Humira '13-->didn't take  - Enbrel 12/14-->3/15- no effect!      Last Assessment & Plan:   - \"aching all over\" because she had to be off her anti-rheumatic drugs for 2 weeks in preparation for her R knee surgery  - her R knee surgery got cancelled because she developed cellulitis of her L arm (with fever). She was started on Bactrim, and then transitioned to doxycyline on Aug 1 2018 (3 week course) and her R knee surgery was postponed to 1 month from now.   - As such, she restarted her anti-rheumatics on Aug 1 2018.  - She will have to hold her anti-rheumatics again 2 weeks before her surgery  - She feels her medications are otherwise working well. No changes to be made to her current regimen, which is Humira injection q2 week, leflunomide (Arava), and salicylate     - Pt is concerned that when she transitions insurance in December of this year, she will lose her salicylate. We had a discussion and expressed that it would be best for her to stay on salicylate because we do not want the blood thinning effects of aspirin. Dr. Walden will plan to write a prior authorization for her for salicylate.     • Coronary artery disease      PCI RCA '10, NSTEMI requiring LAD PCI in '13    [facility] 9/3/19 at [facility]: no significant dz; stents patent     • S/P coronary artery stent placement      PLAN:   Neuro: Stable.  Neuro unchanged, right IP weakness   Continue neurochecks every 4 hours     Concern for discitis/osteomyelitis L1/2  L1/2 severe central canal stenosis  Severe stenosis from adjacent segment disease   POD# 1 right L4-5 microdiscectomy    GUILLERMO= 60 ml   Surgical wound culture pending     Right psoas abscess              I&D right psoas abscess performed per IR 3/2/2021 and 3/22/2021   Body fluid culture: 4+ WBCs, " 2+ gram-positive cocci.  Light growth staph   Fungus culture: Pending   Anaerobic culture: Pending   ABF culture: Pending    Cervical stenosis                       POD #18 C6 corpectomy   Continue cervical collar   Intermittent difficulty with swallowing.   Incision: C, D, I    CV: VS WNL.  Sudden chest pain with ambulation.  Currently resolved.  Stat EKG  Pulm: No acute issues.  Maintaining O2 sat.  Continuous pulse oximetry  : No acute issues.  Voiding spontaneously  FEN: Tolerating regular diet.  GI: No acute issues.  ORTHO: Open wound right posterior hip.  MSSA infection, wound vac per PT  ID: Continue cefazolin per ID   Postop vancomycin, pharmacy to dose  Heme:  DVT prophylaxis held for operative procedure; SCDs  Pain: Tolerable at present  Dispo: PT/OT with [facility] brace   Social service consult for placement to continue inpatient PT/OT    CHIEF COMPLAINT:   Lumbar back/right hip and anterior right thigh pain    Subjective  Symptom stable.  Doing well    Temp:  [97.7 °F (36.5 °C)-99.9 °F (37.7 °C)] 97.8 °F (36.6 °C)  Heart Rate:  [67-81] 81  Resp:  [14-18] 16  BP: (126-188)/(50-94) 163/60    Objective:  General Appearance:  Well-appearing and in no acute distress.    Vital signs: (most recent): Blood pressure 163/60, pulse 81, temperature 97.8 °F (36.6 °C), temperature source Oral, resp. rate 16, SpO2 99 %, not currently breastfeeding.      Neurologic Exam     Mental Status   Oriented to person, place, and time.   Attention: normal. Concentration: normal.   Speech: speech is normal   Level of consciousness: alert    Bright and awake.  Oriented x3.  Follows commands without prompting showing thumbs up into fingers bilaterally.     Cranial Nerves     CN II   Visual fields full to confrontation.     CN III, IV, VI   Pupils are equal, round, and reactive to light.  Extraocular motions are normal.     CN V   Facial sensation intact.     CN VII   Facial expression full, symmetric.     CN VIII   CN VIII normal.      CN IX, X   CN IX normal.     CN XI   CN XI normal.     Motor Exam     Strength   Right deltoid: 5/5  Left deltoid: 5/5  Right biceps: 5/5  Left biceps: 5/5  Right triceps: 5/5  Left triceps: 5/5  Right wrist extension: 5/5  Left wrist extension: 5/5  Right interossei: 4/5  Left interossei: 4/5  Right iliopsoas: 2/5  Left iliopsoas: 5/5  Right quadriceps: 2/5  Left quadriceps: 5/5  Right anterior tibial: 3/5  Left anterior tibial: 5/5  Right gastroc: 4/5  Left gastroc: 5/5       Sensory Exam   Sensory deficit distribution on right: L1    Gait, Coordination, and Reflexes     Tremor   Resting tremor: absent  Intention tremor: absent  Action tremor: absent    Reflexes   Right plantar: upgoing  Left plantar: normal  Right Mims: present  Left Mims: absent  Right ankle clonus: present  Left ankle clonus: present  Right pendular knee jerk: absent  Left pendular knee jerk: absent    Drains:   [REMOVED] Closed/Suction Drain 1 Anterior Neck Bulb 10 Fr. (Removed)   Site Description Unable to view 03/06/21 0815   Dressing Status Clean;Dry;Intact 03/06/21 0815   Drainage Appearance Serosanguineous 03/06/21 0815   Status To bulb suction 03/06/21 0815   Output (mL) 20 mL 03/05/21 1800       [REMOVED] Urethral Catheter Silicone 16 Fr. (Removed)   Daily Indications Required activity restriction from trauma, surgery, (e.g. unstable spine, fracture, hemodynamics) 03/04/21 0800   Site Assessment Clean;Skin intact 03/04/21 0800   Collection Container Standard drainage bag 03/04/21 0800   Securement Method Securing device 03/04/21 0800   Catheter care complete Yes 03/04/21 0800   Output (mL) 425 mL 03/04/21 0800       Imaging Results (Last 24 Hours)     ** No results found for the last 24 hours. **        Lab Results (last 24 hours)     Procedure Component Value Units Date/Time    Comprehensive Metabolic Panel [[id number]]  (Abnormal) Collected: 03/24/21 0422    Specimen: Blood Updated: 03/24/21 0554     Glucose 79 mg/dL      BUN  8 mg/dL      Creatinine 0.32 mg/dL      Sodium 138 mmol/L      Potassium 4.5 mmol/L      Chloride 104 mmol/L      CO2 26.0 mmol/L      Calcium 8.4 mg/dL      Total Protein 5.4 g/dL      Albumin 2.30 g/dL      ALT (SGPT) <5 U/L      AST (SGOT) 19 U/L      Alkaline Phosphatase 78 U/L      Total Bilirubin 0.3 mg/dL      eGFR Non African Amer >150 mL/min/1.73      Globulin 3.1 gm/dL      A/G Ratio 0.7 g/dL      BUN/Creatinine Ratio 25.0     Anion Gap 8.0 mmol/L     Narrative:      GFR Normal >60  Chronic Kidney Disease <60  Kidney Failure <15      C-reactive Protein [[id number]]  (Abnormal) Collected: 03/24/21 0422    Specimen: Blood Updated: 03/24/21 0554     C-Reactive Protein 9.31 mg/dL     Wound Culture - Wound, Spine, Lumbar [[id number]] Collected: 03/23/21 1054    Specimen: Wound from Spine, Lumbar Updated: 03/24/21 0552     Wound Culture No growth     Gram Stain No WBCs or organisms seen    CBC (No Diff) [[id number]]  (Abnormal) Collected: 03/24/21 0422    Specimen: Blood Updated: 03/24/21 0535     WBC 4.71 10*3/mm3      RBC 3.10 10*6/mm3      Hemoglobin 8.7 g/dL      Hematocrit 28.3 %      MCV 91.3 fL      MCH 28.1 pg      MCHC 30.7 g/dL      RDW 16.6 %      RDW-SD 54.5 fl      MPV 11.1 fL      Platelets 218 10*3/mm3         [id number]  Taiwo Prado, APRN

## 2021-03-24 NOTE — PLAN OF CARE
Goal Outcome Evaluation:  Plan of Care Reviewed With: patient  Progress: improving  Outcome Summary: pt trans to EOB min assist, min assist to abilio LSO, sit-stand cga-min assist, trans to BSC CGA, amb 10 feet cga rwx, trans to chair cga, performed BLE exercises, wound vac dressing intact, will cont to check daily, plan to change vac dressing Friday or PRN. pt would benefit from rehab

## 2021-03-24 NOTE — PLAN OF CARE
Goal Outcome Evaluation:  Plan of Care Reviewed With: patient  Progress: improving  Outcome Summary: Pt c/o pain in R hip around wound vac, RN aware. Pt t/fs with SBA/CGA with RW. Pt completed toileting with SBA. Pt max A to don socks and adjust c collar. Pt would benefit from acute rehab at discharge. Continue OT POC

## 2021-03-25 LAB
ALBUMIN SERPL-MCNC: 2.5 G/DL (ref 3.5–5.2)
ALBUMIN/GLOB SERPL: 0.7 G/DL
ALP SERPL-CCNC: 86 U/L (ref 39–117)
ALT SERPL W P-5'-P-CCNC: <5 U/L (ref 1–33)
ANION GAP SERPL CALCULATED.3IONS-SCNC: 7 MMOL/L (ref 5–15)
AST SERPL-CCNC: 13 U/L (ref 1–32)
BACTERIA FLD CULT: ABNORMAL
BILIRUB SERPL-MCNC: 0.3 MG/DL (ref 0–1.2)
BUN SERPL-MCNC: 7 MG/DL (ref 8–23)
BUN/CREAT SERPL: 17.5 (ref 7–25)
CALCIUM SPEC-SCNC: 8.7 MG/DL (ref 8.6–10.5)
CHLORIDE SERPL-SCNC: 102 MMOL/L (ref 98–107)
CO2 SERPL-SCNC: 30 MMOL/L (ref 22–29)
CREAT SERPL-MCNC: 0.4 MG/DL (ref 0.57–1)
CRP SERPL-MCNC: 5.37 MG/DL (ref 0–0.5)
DEPRECATED RDW RBC AUTO: 53.3 FL (ref 37–54)
ERYTHROCYTE [DISTWIDTH] IN BLOOD BY AUTOMATED COUNT: 16.7 % (ref 12.3–15.4)
GFR SERPL CREATININE-BSD FRML MDRD: >150 ML/MIN/1.73
GLOBULIN UR ELPH-MCNC: 3.5 GM/DL
GLUCOSE SERPL-MCNC: 86 MG/DL (ref 65–99)
GRAM STN SPEC: ABNORMAL
GRAM STN SPEC: ABNORMAL
HCT VFR BLD AUTO: 31.8 % (ref 34–46.6)
HGB BLD-MCNC: 9.7 G/DL (ref 12–15.9)
MCH RBC QN AUTO: 27.5 PG (ref 26.6–33)
MCHC RBC AUTO-ENTMCNC: 30.5 G/DL (ref 31.5–35.7)
MCV RBC AUTO: 90.1 FL (ref 79–97)
PLATELET # BLD AUTO: 264 10*3/MM3 (ref 140–450)
PMV BLD AUTO: 9.6 FL (ref 6–12)
POTASSIUM SERPL-SCNC: 3.7 MMOL/L (ref 3.5–5.2)
PROT SERPL-MCNC: 6 G/DL (ref 6–8.5)
QT INTERVAL: 406 MS
QTC INTERVAL: 453 MS
RBC # BLD AUTO: 3.53 10*6/MM3 (ref 3.77–5.28)
SARS-COV-2 RNA RESP QL NAA+PROBE: NOT DETECTED
SODIUM SERPL-SCNC: 139 MMOL/L (ref 136–145)
WBC # BLD AUTO: 5.29 10*3/MM3 (ref 3.4–10.8)

## 2021-03-25 PROCEDURE — U0003 INFECTIOUS AGENT DETECTION BY NUCLEIC ACID (DNA OR RNA); SEVERE ACUTE RESPIRATORY SYNDROME CORONAVIRUS 2 (SARS-COV-2) (CORONAVIRUS DISEASE [COVID-19]), AMPLIFIED PROBE TECHNIQUE, MAKING USE OF HIGH THROUGHPUT TECHNOLOGIES AS DESCRIBED BY CMS-2020-01-R: HCPCS | Performed by: NURSE PRACTITIONER

## 2021-03-25 PROCEDURE — 86140 C-REACTIVE PROTEIN: CPT | Performed by: INTERNAL MEDICINE

## 2021-03-25 PROCEDURE — 80053 COMPREHEN METABOLIC PANEL: CPT | Performed by: INTERNAL MEDICINE

## 2021-03-25 PROCEDURE — 25810000003 SODIUM CHLORIDE 0.9 % WITH KCL 20 MEQ 20-0.9 MEQ/L-% SOLUTION: Performed by: NURSE PRACTITIONER

## 2021-03-25 PROCEDURE — 63710000001 PREDNISONE PER 5 MG: Performed by: NURSE PRACTITIONER

## 2021-03-25 PROCEDURE — 25010000002 VANCOMYCIN 10 G RECONSTITUTED SOLUTION: Performed by: NEUROLOGICAL SURGERY

## 2021-03-25 PROCEDURE — 99024 POSTOP FOLLOW-UP VISIT: CPT | Performed by: NURSE PRACTITIONER

## 2021-03-25 PROCEDURE — 25010000002 HEPARIN (PORCINE) PER 1000 UNITS: Performed by: NURSE PRACTITIONER

## 2021-03-25 PROCEDURE — 85027 COMPLETE CBC AUTOMATED: CPT | Performed by: INTERNAL MEDICINE

## 2021-03-25 PROCEDURE — U0005 INFEC AGEN DETEC AMPLI PROBE: HCPCS | Performed by: NURSE PRACTITIONER

## 2021-03-25 PROCEDURE — 97116 GAIT TRAINING THERAPY: CPT

## 2021-03-25 PROCEDURE — 94799 UNLISTED PULMONARY SVC/PX: CPT

## 2021-03-25 PROCEDURE — 25010000002 CEFAZOLIN PER 500 MG: Performed by: NURSE PRACTITIONER

## 2021-03-25 PROCEDURE — 25010000002 CEFAZOLIN PER 500 MG: Performed by: INTERNAL MEDICINE

## 2021-03-25 RX ADMIN — CYCLOBENZAPRINE 10 MG: 10 TABLET, FILM COATED ORAL at 06:35

## 2021-03-25 RX ADMIN — CYCLOBENZAPRINE 10 MG: 10 TABLET, FILM COATED ORAL at 22:51

## 2021-03-25 RX ADMIN — SODIUM CHLORIDE, PRESERVATIVE FREE 10 ML: 5 INJECTION INTRAVENOUS at 08:53

## 2021-03-25 RX ADMIN — PANTOPRAZOLE SODIUM 40 MG: 40 TABLET, DELAYED RELEASE ORAL at 05:03

## 2021-03-25 RX ADMIN — OXYCODONE HYDROCHLORIDE AND ACETAMINOPHEN 1 TABLET: 10; 325 TABLET ORAL at 00:52

## 2021-03-25 RX ADMIN — VANCOMYCIN HYDROCHLORIDE 1250 MG: 10 INJECTION, POWDER, LYOPHILIZED, FOR SOLUTION INTRAVENOUS at 11:56

## 2021-03-25 RX ADMIN — OXYCODONE HYDROCHLORIDE AND ACETAMINOPHEN 1 TABLET: 10; 325 TABLET ORAL at 04:55

## 2021-03-25 RX ADMIN — CLONIDINE HYDROCHLORIDE 0.1 MG: 0.1 TABLET ORAL at 00:51

## 2021-03-25 RX ADMIN — PREDNISONE 2 MG: 1 TABLET ORAL at 08:51

## 2021-03-25 RX ADMIN — ISOSORBIDE MONONITRATE 60 MG: 60 TABLET, EXTENDED RELEASE ORAL at 08:53

## 2021-03-25 RX ADMIN — HEPARIN SODIUM 5000 UNITS: 5000 INJECTION INTRAVENOUS; SUBCUTANEOUS at 17:14

## 2021-03-25 RX ADMIN — SODIUM CHLORIDE, PRESERVATIVE FREE 10 ML: 5 INJECTION INTRAVENOUS at 22:53

## 2021-03-25 RX ADMIN — CARVEDILOL 12.5 MG: 6.25 TABLET, FILM COATED ORAL at 08:53

## 2021-03-25 RX ADMIN — CEFAZOLIN SODIUM 2 G: 10 INJECTION, POWDER, FOR SOLUTION INTRAVENOUS at 08:21

## 2021-03-25 RX ADMIN — LEVOTHYROXINE SODIUM 75 MCG: 75 TABLET ORAL at 05:03

## 2021-03-25 RX ADMIN — CARVEDILOL 12.5 MG: 6.25 TABLET, FILM COATED ORAL at 17:13

## 2021-03-25 RX ADMIN — OXYCODONE HYDROCHLORIDE AND ACETAMINOPHEN 1 TABLET: 10; 325 TABLET ORAL at 08:51

## 2021-03-25 RX ADMIN — DOCUSATE SODIUM 50 MG AND SENNOSIDES 8.6 MG 2 TABLET: 8.6; 5 TABLET, FILM COATED ORAL at 08:52

## 2021-03-25 RX ADMIN — GABAPENTIN 100 MG: 100 CAPSULE ORAL at 08:51

## 2021-03-25 RX ADMIN — OXYCODONE HYDROCHLORIDE AND ACETAMINOPHEN 1 TABLET: 10; 325 TABLET ORAL at 17:14

## 2021-03-25 RX ADMIN — FUROSEMIDE 40 MG: 40 TABLET ORAL at 08:51

## 2021-03-25 RX ADMIN — VANCOMYCIN HYDROCHLORIDE 1250 MG: 10 INJECTION, POWDER, LYOPHILIZED, FOR SOLUTION INTRAVENOUS at 22:53

## 2021-03-25 RX ADMIN — CEFAZOLIN SODIUM 2 G: 10 INJECTION, POWDER, FOR SOLUTION INTRAVENOUS at 00:24

## 2021-03-25 RX ADMIN — ISOSORBIDE MONONITRATE 60 MG: 60 TABLET, EXTENDED RELEASE ORAL at 17:13

## 2021-03-25 RX ADMIN — ACETAMINOPHEN 650 MG: 325 TABLET, FILM COATED ORAL at 14:34

## 2021-03-25 RX ADMIN — ACETAMINOPHEN 650 MG: 325 TABLET, FILM COATED ORAL at 06:35

## 2021-03-25 RX ADMIN — POLYETHYLENE GLYCOL (3350) 17 G: 17 POWDER, FOR SOLUTION ORAL at 08:50

## 2021-03-25 RX ADMIN — POTASSIUM CHLORIDE AND SODIUM CHLORIDE 100 ML/HR: 900; 150 INJECTION, SOLUTION INTRAVENOUS at 16:10

## 2021-03-25 RX ADMIN — Medication 250 MG: at 22:51

## 2021-03-25 RX ADMIN — GUAIFENESIN 600 MG: 600 TABLET, EXTENDED RELEASE ORAL at 22:52

## 2021-03-25 RX ADMIN — DOCUSATE SODIUM 100 MG: 100 CAPSULE ORAL at 08:52

## 2021-03-25 RX ADMIN — HEPARIN SODIUM 5000 UNITS: 5000 INJECTION INTRAVENOUS; SUBCUTANEOUS at 05:03

## 2021-03-25 RX ADMIN — DOCUSATE SODIUM 100 MG: 100 CAPSULE ORAL at 22:52

## 2021-03-25 RX ADMIN — ACETAMINOPHEN 650 MG: 325 TABLET, FILM COATED ORAL at 22:52

## 2021-03-25 RX ADMIN — DOCUSATE SODIUM 50 MG AND SENNOSIDES 8.6 MG 2 TABLET: 8.6; 5 TABLET, FILM COATED ORAL at 22:52

## 2021-03-25 RX ADMIN — CYCLOBENZAPRINE 10 MG: 10 TABLET, FILM COATED ORAL at 14:34

## 2021-03-25 RX ADMIN — CEFAZOLIN SODIUM 2 G: 10 INJECTION, POWDER, FOR SOLUTION INTRAVENOUS at 23:30

## 2021-03-25 RX ADMIN — CEFAZOLIN SODIUM 2 G: 10 INJECTION, POWDER, FOR SOLUTION INTRAVENOUS at 16:10

## 2021-03-25 RX ADMIN — GUAIFENESIN 600 MG: 600 TABLET, EXTENDED RELEASE ORAL at 08:53

## 2021-03-25 RX ADMIN — Medication 250 MG: at 08:52

## 2021-03-25 RX ADMIN — GABAPENTIN 100 MG: 100 CAPSULE ORAL at 22:51

## 2021-03-25 NOTE — CONSULTS
INFECTIOUS DISEASES CONSULT NOTE    Patient:  Tawny Shin 65 y.o. female  ROOM # 357/1  YOB: 1955  MRN: [id number]  [facility]:  [id number]  Admit date: 3/22/2021   Admitting Physician: Bandar Shea, *  Primary Care Physician: Davon Urrutia MD  REFERRING PROVIDER: Bandar Shea,*    Reason for Consultation: Antibiotic recommendations    History of Present Illness/Chief Complaint: Tawny is a very pleasant 65-year-old woman.  She is known to me from previous consultation both on acute care at [facility] and also while she is being treated on the long-term acute care unit. She was transferred back to acute care to undergo lumbar surgery.  She had had a postoperative infection involving the right hip area after gluteus muscle repair.  She had had staph aureus bacteremia.  She had cervical spine stenosis for which she underwent recent surgery.  She has had a right psoas abscess that has been aspirated twice and has grown methicillin susceptible staph aureus.  She underwent lumbar laminectomy during this admission for spinal stenosis and discitis.  She seems to be stable postoperatively.  I had not been asked to reconsult on her until today.  I did see her yesterday while she was in her room.  She was interactive.  She indicates she can ambulate about 5 or 6 steps but does get soreness in the right hip with ambulation and also at times at rest in the area of her wound VAC.  The discomfort in this area has been fairly consistent over the 1 to 2 weeks.  It does limit her participation with therapy to some degree.  She does not have pain or discomfort at her PICC line site.  She has not had any rash or diarrhea.  She will likely go to acute rehab today for additional treatment.  I saw her today via [employer] [facility] [facility] [employer] as I am in clinic all day.  Was asked to provide antibiotic recommendations that she may go to acute rehab today.I was asked to review and update any antibiotic  recommendations.    Current Scheduled Medications:   carvedilol, 12.5 mg, Oral, BID With Meals  ceFAZolin, 2 g, Intravenous, Q8H  docusate sodium, 100 mg, Oral, BID  furosemide, 40 mg, Oral, Daily  gabapentin, 100 mg, Oral, Q12H  guaiFENesin, 600 mg, Oral, Q12H  heparin (porcine), 5,000 Units, Subcutaneous, Q12H  isosorbide mononitrate, 60 mg, Oral, BID  levothyroxine, 75 mcg, Oral, Q AM  pantoprazole, 40 mg, Oral, Q AM  polyethylene glycol, 17 g, Oral, Daily  predniSONE, 2 mg, Oral, Daily  saccharomyces boulardii, 250 mg, Oral, BID  senna-docusate sodium, 2 tablet, Oral, BID  sodium chloride, 10 mL, Intravenous, Q12H  vancomycin, 1,250 mg, Intravenous, Q12H      Current PRN Medications:  •  acetaminophen **OR** acetaminophen **OR** acetaminophen  •  bisacodyl  •  budesonide  •  cloNIDine  •  cyclobenzaprine  •  labetalol  •  naloxone  •  nitroglycerin  •  ondansetron  •  oxyCODONE-acetaminophen  •  oxyCODONE-acetaminophen  •  sodium chloride    Allergies:    Allergies   Allergen Reactions   • Atorvastatin Other (See Comments)     LEG CRAMPS     • Amoxicillin Rash   • Escitalopram Rash   • Nabumetone Rash   • Niacin Er Rash   • Penicillins Rash   • Simvastatin Rash         Past Medical History: Right hip gluteus muscle tear.  Cervical spine stenosis.  Lumbar discitis.  Lumbar spine stenosis.  Methicillin susceptible Staphylococcus aureus bacteremia.Degenerative arthritis.  Coronary artery disease.  Herpes simplex.  Hypertension.  Rheumatoid arthritis.  Sjogren's syndrome.  Sensorineural hearing loss.     Past Surgical History: Two aspirations of right psoas abscess.  Cervical spine decompression.  Lumbar discitis debridement/laminectomy.Pacemaker insertion.  Cataract extraction.  Coronary artery stenting.  Knee arthroplasty.  Lumbar laminectomy.  Myringotomy tube placement.     Social History: .  No tobacco, alcohol, or illicit drug use.     Family History: Cancer and heart disease.     Exposure History: No  "close contacts have been ill    Review of Systems See HPI    Vital Signs:  /59 (BP Location: Right arm, Patient Position: Lying)   Pulse 83   Temp 97.8 °F (36.6 °C) (Oral)   Resp 16   Ht 167.6 cm (66\")   Wt 94.2 kg (207 lb 11.2 oz)   SpO2 94%   BMI 33.52 kg/m²  Temp (24hrs), Av.9 °F (36.6 °C), Min:97.7 °F (36.5 °C), Max:98 °F (36.7 °C)    Physical Exam  Vital signs - reviewed.  Line/IV (Left arm PICC)site - No erythema or tenderness.  Seen via up [facility].  Alert, pleasant, smiling, interactive, and in no distress.    Lab Results:  CBC:   Results from last 7 days   Lab Units 21  0450 21  0422 21  0420 21  1516 21  0418   WBC 10*3/mm3 5.29 4.71 5.41 6.02 5.85   HEMOGLOBIN g/dL 9.7* 8.7* 9.2* 10.3* 9.7*   HEMATOCRIT % 31.8* 28.3* 29.4* 32.9* 31.1*   PLATELETS 10*3/mm3 264 218 210 230 260     CMP:   Results from last 7 days   Lab Units 21  0450 21  0422 21  0420 21  1516 21  0418   SODIUM mmol/L 139 138 137 138 138   POTASSIUM mmol/L 3.7 4.5 4.0 4.1 3.8   CHLORIDE mmol/L 102 104 100 98 98   CO2 mmol/L 30.0* 26.0 27.0 30.0* 32.0*   BUN mg/dL 7* 8 7* 7* 9   CREATININE mg/dL 0.40* 0.32* 0.35* 0.38* 0.43*   CALCIUM mg/dL 8.7 8.4* 7.9* 8.8 8.8   BILIRUBIN mg/dL 0.3 0.3 0.4 0.5 0.4   ALK PHOS U/L 86 78 88 98 103   ALT (SGPT) U/L <5 <5 <5 <5 <5   AST (SGOT) U/L 13 19 17 16 15   GLUCOSE mg/dL 86 79 78 113* 79     Blood cultures 2021 through February 10, 2021-MSSA  (Susceptibility outlined below)  Blood cultures 2021-no growth  BLood cultures 2021-no growth  Right psoas abscess cultures 2021-MSSA   Right psoas abscess cultures 2021-MSSA    Susceptibility testing on blood culture from 2021:  Susceptibility     Staphylococcus aureus     LICHA     Gentamicin <=0.5 ug/ml Susceptible     Oxacillin 0.5 ug/ml Susceptible     Rifampin <=0.5 ug/ml Susceptible     Vancomycin 1 ug/ml " Susceptible      Susceptibility testing on right psoas abscess culture from March 22, 2021:  Susceptibility     Staphylococcus aureus     LICHA     Gentamicin <=0.5 ug/ml Susceptible     Oxacillin 0.5 ug/ml Susceptible     Rifampin <=0.5 ug/ml Susceptible     Vancomycin <=0.5 ug/ml Susceptible      Radiology:   Lumbar spine plain films March 23, 2021:  FINDINGS:  Multiple fluoroscopic images are submitted from the OR. A radiologist  was not present for the acquisition or intraoperative interpretation of  these images. Images demonstrate a coned-down view of the lumbar spine.  Hardware is evident in the inferior lumbar spine. Instrumentation  projects over the posterior soft tissues. Please see the operative  report for details pertaining to this exam.  This report was finalized on 03/23/2021 12:40 by Dr. Dillon Flores MD.    Additional Studies Reviewed:     Impression:   1.  MSSA bacteremia-under treatment  2.  Right psoas abscess secondary to MSSA-she has had this abscess aspirated twice  3.  Discitis/osteomyelitis at L1/L2  4.  Underwent lumbar surgery March 23, 2021  5.  Cervical stenosis-has undergone surgical decompression  6.  Rheumatoid arthritis  7.  Previous pacemaker placement    Recommendations:    Feel she would benefit from acute rehab  She has been at bedrest for quite a while with fairly minimal ambulation  Hopefully physical therapy will help improve her strength and hopefully will help improve her overall pain control as she moves more and gets more active  Was planning 8 to 12-week course of intravenous antibiotic therapy for MSSA bloodstream infection, right psoas abscess, lumbar discitis/vertebral osteomyelitis.  Would like to continue IV antibiotic treatment while she is receiving acute and subacute rehab    She is day 42 of antibiotic treatment today  See antibiotic recommendations below    Antibiotic recommendations:  1.  Diagnosis-MSSA bacteremia, right psoas abscess, L1/L2 discitis/vertebral  osteomyelitis, postop wound infection right gluteal muscle tear repair, rheumatoid arthritis  2.  IV access-PICC line  3.  Spine surgeon-Carrillo Shea MD  Orthopedic surgeon-Nino Carlson MD  4.  Antibiotic recommendation:  Cefazolin 2 g IV every 8 hours  Last dose of cefazolin April 8, 2021 (8 weeks following clearing of blood cultures) or May 5, 2021 (12 weeks following clearing of blood cultures)  5.  Laboratory monitoring:  Plan CMP, CBC, and CRP weekly    Elie Ohara MD  03/25/21  13:54 CDT

## 2021-03-25 NOTE — PLAN OF CARE
Goal Outcome Evaluation:  Plan of Care Reviewed With: patient  Progress: no change  Outcome Summary: A&O x 4, wound vac in place, IVF cont, IV ATB cont, c collar in place, scds, up to BSC,  no s/sx of acute distress this shift.

## 2021-03-25 NOTE — PROGRESS NOTES
Continued Stay Note  [facility]     Patient Name: Tawny Shin  MRN: [id number]  Today's Date: 3/25/2021    Admit Date: 3/22/2021    Discharge Plan     Row Name 03/25/21 1334       Plan    Plan  Spoke with Samira nurse at Dr Copeland's office,  requesting dc antibiotic orders [facility]. Pending dc antibx order per ID. Dr Shea says ready for dc today    Row Name 03/25/21 1121       Plan    Plan Comments  [facility] when pt is stable for DC. Will need final ID orders prior to dc.    Final Discharge Disposition Code  62 - inpatient rehab facility        Discharge Codes    No documentation.             Belinda Campos RN

## 2021-03-25 NOTE — PROGRESS NOTES
Pharmacy Dosing Service  Pharmacokinetics  Vancomycin Follow-up Evaluation    Assessment/Action/Plan:      AUC Model Data:  Regimen: 1250 mg every 12 hours   Exposure target: AUC24 (range)400-600 mg/L.hr  AUC24,ss: 455 mg/L.hr  PAUC*: 74 %  Ctrough,ss: 11.6 mg/L  Pconc*: 0 %  Tox.: 7 %    Calculated AUC based on trough level drawn tonight subtherapeutic at 273. Renal function stable. Vancomycin dose increased to 1250mg IV q12. Pharmacy will continue to follow daily and adjust dose accordingly.     Subjective:  Tawny Shin is a 65 y.o. female currently on Vancomycin 750 mg IV every 12 hours for the treatment of osteomyelitis, day 2 of therapy. Current end date: 4/2/21    Objective:  Ht:  ; Wt:    Estimated Creatinine Clearance: 81.7 mL/min (A) (by C-G formula based on SCr of 0.32 mg/dL (L)).   Creatinine   Date Value Ref Range Status   03/24/2021 0.32 (L) 0.57 - 1.00 mg/dL Final   03/23/2021 0.35 (L) 0.57 - 1.00 mg/dL Final   03/22/2021 0.38 (L) 0.57 - 1.00 mg/dL Final      Lab Results   Component Value Date    WBC 4.71 03/24/2021    WBC 5.41 03/23/2021    WBC 6.02 03/22/2021         Lab Results   Component Value Date    VANCOTROUGH 9.20 03/24/2021         Culture Results:  Microbiology Results (last 10 days)       Procedure Component Value - Date/Time    Wound Culture - Wound, Spine, Lumbar [[id number]] Collected: 03/23/21 1054    Lab Status: Preliminary result Specimen: Wound from Spine, Lumbar Updated: 03/24/21 0552     Wound Culture No growth     Gram Stain No WBCs or organisms seen    AFB Culture - Body Fluid, Spine, Lumbar [[id number]] Collected: 03/22/21 1708    Lab Status: Preliminary result Specimen: Body Fluid from Spine, Lumbar Updated: 03/24/21 1535     AFB Stain No acid fast bacilli seen on direct smear      No acid fast bacilli seen on concentrated smear    Body Fluid Culture - Body Fluid, Spine, Lumbar [[id number]]  (Abnormal) Collected: 03/22/21 1708    Lab Status: Preliminary result Specimen: Body  Fluid from Spine, Lumbar Updated: 03/24/21 0618     Body Fluid Culture Light growth (2+) Staphylococcus aureus     Gram Stain Many (4+) WBCs seen      Few (2+) Gram positive cocci    COVID-19, ABBOTT IN-HOUSE,NASAL Swab (NO TRANSPORT MEDIA) 2 HR TAT - Swab, Nasopharynx [[id number]]  (Normal) Collected: 03/22/21 0549    Lab Status: Final result Specimen: Swab from Nasopharynx Updated: 03/22/21 0639     COVID19 Presumptive Negative    Narrative:      Fact sheet for providers: https://www.fda.gov/media/392511/download     Fact sheet for patients: https://www.fda.gov/media/401346/download    Test performed by PCR.  If inconsistent with clinical signs and symptoms patient should be tested with different authorized molecular test.    COVID-19, ABBOTT IN-HOUSE,NASAL Swab (NO TRANSPORT MEDIA) 2 HR TAT - Swab, Nasopharynx [[id number]]  (Normal) Collected: 03/19/21 0919    Lab Status: Final result Specimen: Swab from Nasopharynx Updated: 03/19/21 1003     COVID19 Presumptive Negative    Narrative:      Fact sheet for providers: https://www.fda.gov/media/774574/download     Fact sheet for patients: https://www.fda.gov/media/561095/download    Test performed by PCR.  If inconsistent with clinical signs and symptoms patient should be tested with different authorized molecular test.            MARKIE Mobley PharmD   03/24/21 22:22 CDT

## 2021-03-25 NOTE — PROGRESS NOTES
Continued Stay Note  [facility]     Patient Name: Tawny Shin  MRN: [id number]  Today's Date: 3/25/2021    Admit Date: 3/22/2021    Discharge Plan     Row Name 03/25/21 1506       Plan    Final Note  [facility]. Pt will need new Covid test. Notified APRN. RN can call report to [phone]    Row Name 03/25/21 3525       Plan    Plan  Spoke with nurse Samira at Dr Copeland's office,  requesting dc antibiotic [facility] Pending dc antibx order per ID.        Discharge Codes    No documentation.             OSIEL Pizano

## 2021-03-25 NOTE — THERAPY TREATMENT NOTE
Acute Care - Physical Therapy Treatment Note  [facility]     Patient Name: Tawny Shin  : 1955  MRN: [id number]  Today's Date: 3/25/2021           PT Assessment (last 12 hours)      PT Evaluation and Treatment     [facility] Name 2114          Physical Therapy Time and Intention    Subjective Information  complains of;pain;weakness;fatigue  -     Document Type  therapy note (daily note)  -     Mode of Treatment  physical therapy  -[facility] Name 21          General Information    Existing Precautions/Restrictions  fall;spinal C-collar AAT, LSO,[place],RLE WBAT, GUILLERMO drain  -     Row Name 21          Pain Scale: Numbers Pre/Post-Treatment    Pretreatment Pain Rating  5/10  -     Posttreatment Pain Rating  7/10  -     Pain Location - Side  Right  -     Pain Location - Orientation  incisional  -     Pain Location  hip  -[facility] Name 21          Pain Scale: Word Pre/Post-Treatment    Pain: Word Scale, Pretreatment  0 - no pain at rest  -     Posttreatment Pain Rating  2 - mild pain while standing  -     Pain Location  chest  -     Pain Intervention(s)  Medication (See MAR);Repositioned;Ambulation/increased activity  -[facility] Name 21          Bed Mobility    Sidelying-Sit [place] (Bed Mobility)  -- chair  -     Sit-Sidelying [place] (Bed Mobility)  -- CHAIR  -[facility] Name 21          Transfers    Sit-Stand [place] (Transfers)  contact guard;minimum assist (75% patient effort);verbal cues  -     Stand-Sit [place] (Transfers)  contact guard;verbal cues  -[facility] Name 21          Gait/Stairs (Locomotion)    [place] Level (Gait)  contact guard;verbal cues  -     Assistive Device (Gait)  walker, front-wheeled  -     Distance in Feet (Gait)  10 X 2 sitting rest  -[facility] Name 21          Motor Skills    Therapeutic Exercise  knee;ankle  -[facility] Name 21           Knee (Therapeutic Exercise)    Knee (Therapeutic Exercise)  strengthening exercise;isometric exercises  -     Knee Isometrics (Therapeutic Exercise)  bilateral;quad sets;sitting  -     Knee Strengthening (Therapeutic Exercise)  bilateral;LAQ (long arc quad)  -     Row Name 03/25/21 0814          Ankle (Therapeutic Exercise)    Ankle (Therapeutic Exercise)  AROM (active range of motion)  -     Ankle AROM (Therapeutic Exercise)  bilateral;dorsiflexion;plantarflexion  -     Row Name             Wound 03/03/21 1446 Right anterior neck Incision    Wound - Properties Group Placement Date: 03/03/21  -HW Placement Time: 1446  -HW Present on Hospital Admission: N  -HW Side: Right  -TANIKA Orientation: anterior  -HW Location: neck  -HW Primary Wound Type: Incision  -HW    Retired Wound - Properties Group Date first assessed: 03/03/21  -HW Time first assessed: 1446  -HW Present on Hospital Admission: N  -HW Side: Right  -TANIKA Location: neck  -HW Primary Wound Type: Incision  -    Row Name 03/25/21 0814          Wound 02/09/21 1715 Right hip Incision    Wound - Properties Group Placement Date: 02/09/21  -SH Placement Time: 1715  -SH Present on Hospital Admission: Y  -SH Side: Right  -SH Location: hip  -SH Primary Wound Type: Incision  -SH    Dressing Appearance  intact  -     Care, Wound  negative pressure wound therapy  -     Wound Output (mL)  300 350 TOTAL  -     Retired Wound - Properties Group Date first assessed: 02/09/21  -SH Time first assessed: 1715  -SH Present on Hospital Admission: Y  -SH Side: Right  -SH Location: hip  -SH Primary Wound Type: Incision  -SH    Row Name             Wound 03/23/21 1021 lumbar spine Incision    Wound - Properties Group Placement Date: 03/23/21  -KINGS Placement Time: 1021  -KINGS Location: lumbar spine  -KINGS Primary Wound Type: Incision  -KINGS    Retired Wound - Properties Group Date first assessed: 03/23/21  -KINGS Time first assessed: 1021  -KINGS Location: lumbar spine  -KINGS Primary  Wound Type: Incision  -KINGS    Row Name 03/25/21 0814          NPWT (Negative Pressure Wound Therapy) 02/09/21 1741 right hip    NPWT (Negative Pressure Wound Therapy) - Properties Group Placement Date: 02/09/21 -SH Placement Time: 1741 -SH Location: right hip  -SH Additional Comments: 1 pc of black sponge  -SH    Therapy Setting  continuous therapy  -AH     Instillation  normal saline  -AH     Pressure Setting  125 mmHg  -AH     Retired NPWT (Negative Pressure Wound Therapy) - Properties Group Placement Date: 02/09/21 -SH Placement Time: 1741 -SH Location: right hip  -SH Additional Comments: 1 pc of black sponge  -SH    Row Name 03/25/21 0814          Plan of Care Review    Plan of Care Reviewed With  patient  -     Progress  no change  -     Outcome Summary  pt c/o pain in R hip incision, and soreness in chest with amb, pt performed BLE LAQ,QS,AP, sit-stand min assist, pt amb 10 feet x 2 with rwx ccga-min assist, pt required a sitting rest due to R hip pain and weakness, wound vac dressing intact, will change dressing Friday/PRN, pt would benefit from rehab  -     Row Name 03/25/21 0814          Positioning and Restraints    Pre-Treatment Position  sitting in chair/recliner  -     Post Treatment Position  chair  -     In Chair  reclined;call light within reach;encouraged to call for assist;notified [facility]  -       User Key  (r) = Recorded By, (t) = Taken By, (c) = Cosigned By    Initials Name Provider Type     Olga Chambers, SERENA Physical Therapy Assistant    Ewelina Eubanks, RN Registered Nurse    Esequiel Roche, RN Registered Nurse    Laurie Ritter, RN Registered Nurse    Benjie Melendez, RN Registered Nurse        Physical Therapy Education                 Title: PT OT SLP Therapies (In Progress)     Topic: Physical Therapy (In Progress)     Point: Mobility training (Done)     Learning Progress Summary           Patient Acceptance, E,TB, VU by  at 3/24/2021 1023    Comment: trans     Acceptance, E, VU,NR by SB at 3/23/2021 1603    Comment: pt edu on POC, benefits of act, dc plans, LSO brace, spinal precautions, purpose of NPWT   Family Acceptance, E, VU,NR by SB at 3/23/2021 1603    Comment: pt edu on POC, benefits of act, dc plans, LSO brace, spinal precautions, purpose of NPWT                   Point: Home exercise program (Not Started)     Learner Progress:  Not documented in this visit.          Point: Body mechanics (Done)     Learning Progress Summary           Patient Acceptance, E, VU,NR by SB at 3/23/2021 1603    Comment: pt edu on POC, benefits of act, dc plans, LSO brace, spinal precautions, purpose of NPWT   Family Acceptance, E, VU,NR by SB at 3/23/2021 1603    Comment: pt edu on POC, benefits of act, dc plans, LSO brace, spinal precautions, purpose of NPWT                   Point: Precautions (Done)     Learning Progress Summary           Patient Acceptance, E, VU,NR by SB at 3/23/2021 1603    Comment: pt edu on POC, benefits of act, dc plans, LSO brace, spinal precautions, purpose of NPWT   Family Acceptance, E, VU,NR by SB at 3/23/2021 1603    Comment: pt edu on POC, benefits of act, dc plans, LSO brace, spinal precautions, purpose of NPWT                               User Key     Initials Effective Dates Name Provider Type Discipline     08/02/16 -  Olga Chambers, PTA Physical Therapy Assistant PT    SB 10/31/19 -  Alyssa Gallardo, PT DPT Physical Therapist PT              PT Recommendation and Plan     Plan of Care Reviewed With: patient  Progress: no change  Outcome Summary: pt c/o pain in R hip incision, and soreness in chest with amb, pt performed BLE LAQ,QS,AP, sit-stand min assist, pt amb 10 feet x 2 with rwx ccga-min assist, pt required a sitting rest due to R hip pain and weakness, wound vac dressing intact, will change dressing Friday/PRN, pt would benefit from rehab  Outcome Measures     Row Name 03/24/21 1400             How much help from another is currently  needed...    Putting on and taking off regular lower body clothing?  2  -TS      Bathing (including washing, rinsing, and drying)  2  -TS      Toileting (which includes using toilet bed pan or urinal)  3  -TS      Putting on and taking off regular upper body clothing  3  -TS      Taking care of personal grooming (such as brushing teeth)  3  -TS      Eating meals  4  -TS      AM-PAC 6 Clicks Score (OT)  17  -TS        User Key  (r) = Recorded By, (t) = Taken By, (c) = Cosigned By    Initials Name Provider Type     Carolee Giordano COTA/L Occupational Therapy Assistant           Time Calculation:   PT Charges     Row Name 03/25/21 0851             Time Calculation    Start Time  0814  -      Stop Time  0837  -      Time Calculation (min)  23 min  -      PT Received On  03/25/21  -         Time Calculation- PT    Total Timed Code Minutes- PT  23 minute(s)  -         Timed Charges    38788 - Gait Training Minutes   23  -AH        User Key  (r) = Recorded By, (t) = Taken By, (c) = Cosigned By    Initials Name Provider Type     Olga Chambres PTA Physical Therapy Assistant        Therapy Charges for Today     Code Description Service Date Service Provider Modifiers Qty    62836457832 HC PT THER PROC EA 15 MIN 3/24/2021 Olga Chambers, SERENA GP 1    22019772813 HC GAIT TRAINING EA 15 MIN 3/24/2021 Olga Chambers, SERENA GP 1    47587510085 HC GAIT TRAINING EA 15 MIN 3/24/2021 Olga Chambers PTA GP 2    [id number] HC GAIT TRAINING EA 15 MIN 3/25/2021 Olga Chambers PTA GP 2          PT G-Codes  Outcome Measure Options: AM-PAC 6 Clicks Daily Activity (OT)  AM-PAC 6 Clicks Score (PT): 17  AM-PAC 6 Clicks Score (OT): Petrona Chambers PTA  3/25/2021

## 2021-03-25 NOTE — PLAN OF CARE
Goal Outcome Evaluation:  Plan of Care Reviewed With: patient  Progress: no change  Outcome Summary: Patient alert and oriented to person, place, time and situation. C/o pain to neck and HIP.  See MAR for intervention.  Mild edema to BLE.  Lasix per orders.   Wound vac in place, suction appropriate.  Fall and safety precautions in place.

## 2021-03-25 NOTE — PLAN OF CARE
Goal Outcome Evaluation:  Plan of Care Reviewed With: patient  Progress: no change  Outcome Summary: Pt A&O, c/o pain at wound vac site and at back incision site. GUILLERMO drain remains in place. cervical and lumbar incision sites C/D/I. no drainage noted. c collar in place and LSO when OOB.

## 2021-03-25 NOTE — THERAPY TREATMENT NOTE
Acute Care - Physical Therapy Treatment Note  [facility]     Patient Name: Tawny Shin  : 1955  MRN: [id number]  Today's Date: 3/25/2021           PT Assessment (last 12 hours)      PT Evaluation and Treatment     Row Name 21 1504 21 0814       Physical Therapy Time and Intention    Subjective Information  complains of;pain;fatigue  -  complains of;pain;weakness;fatigue  -    Document Type  therapy note (daily note)  -LC  therapy note (daily note)  -    Mode of Treatment  physical therapy  -  physical therapy  -    Comment  c-collar, LSO, GUILLERMO drain, wound vac  -LC  --    Row Name 21 1504 21 0814       General Information    Existing Precautions/Restrictions  fall;spinal C-collar AAT, LSO,[place],RLE WBAT, GUILLERMO drain  -LC  fall;spinal C-collar AAT, LSO,[place],RLE WBAT, GUILLERMO drain  -    Row Name 21 1504 21 0814       Pain Scale: Numbers Pre/Post-Treatment    Pretreatment Pain Rating  0/10 - no pain  -  5/10  -    Posttreatment Pain Rating  7/10  -LC  7/10  -    Pain Location - Side  Right  -LC  Right  -AH    Pain Location - Orientation  incisional  -LC  incisional  -AH    Pain Location  hip  -  hip  -    Row Name 21 1504 21 0814       Pain Scale: Word Pre/Post-Treatment    Pain: Word Scale, Pretreatment  --  0 - no pain at rest  -    Posttreatment Pain Rating  --  2 - mild pain while standing  -    Pain Location  --  chest  -AH    Pain Intervention(s)  Medication (See MAR);Ambulation/increased activity;Repositioned  -  Medication (See MAR);Repositioned;Ambulation/increased activity  -    Row Name 21 1504 21 0814       Bed Mobility    Rolling Left [place] (Bed Mobility)  minimum assist (75% patient effort)  -LC  --    Sidelying-Sit [place] (Bed Mobility)  verbal cues;moderate assist (50% patient effort)  -LC  -- chair  -AH    Sit-Sidelying [place] (Bed Mobility)  verbal cues;moderate assist (50% patient effort)  -LC   -- CHAIR  -    Assistive Device (Bed Mobility)  bed rails;head of bed elevated  -  --    Comment (Bed Mobility)  pt wanted to try transfering to L side d/t already being in L sidelying  -  --    Row Name 03/25/21 1504 03/25/21 0814       Transfers    Sit-Stand [place] (Transfers)  verbal cues;contact guard  -  contact guard;minimum assist (75% patient effort);verbal cues  -    Stand-Sit [place] (Transfers)  verbal cues;contact guard  -  contact guard;verbal cues  -    [place] Level (Toilet Transfer)  verbal cues;contact guard  -  --    Assistive Device (Toilet Transfer)  commode, bedside without drop arms  -  --    Row Name 03/25/21 1504          Sit-Stand Transfer    Assistive Device (Sit-Stand Transfers)  walker, front-wheeled  -     Row Name 03/25/21 1504          Stand-Sit Transfer    Assistive Device (Stand-Sit Transfers)  walker, front-wheeled  -     Row Name 03/25/21 1504          Toilet Transfer    Type (Toilet Transfer)  sit-stand;stand-sit  -     Row Name 03/25/21 1504 03/25/21 0814       Gait/Stairs (Locomotion)    [place] Level (Gait)  verbal cues;contact guard  -  contact guard;verbal cues  -    Assistive Device (Gait)  walker, front-wheeled  -  walker, front-wheeled  -    Distance in Feet (Gait)  18' x2  -  10 X 2 sitting rest  -    Pattern (Gait)  step-through  -  --    Deviations/Abnormal Patterns (Gait)  antalgic;gait speed decreased;stride length decreased  -  --    Bilateral Gait Deviations  forward flexed posture;heel strike decreased  -  --    Row Name 03/25/21 0814          Motor Skills    Therapeutic Exercise  knee;ankle  -     Row Name 03/25/21 0814          Knee (Therapeutic Exercise)    Knee (Therapeutic Exercise)  strengthening exercise;isometric exercises  -     Knee Isometrics (Therapeutic Exercise)  bilateral;quad sets;sitting  -     Knee Strengthening (Therapeutic Exercise)  bilateral;LAQ (long arc quad)  -     Row  Name 03/25/21 0814          Ankle (Therapeutic Exercise)    Ankle (Therapeutic Exercise)  AROM (active range of motion)  -     Ankle AROM (Therapeutic Exercise)  bilateral;dorsiflexion;plantarflexion  -     Row Name 03/25/21 1504          Orthotics & Prosthetics Management    Orthosis Location  spinal orthosis  -     Row Name 03/25/21 1504          Spinal Orthosis Management    Type (Spinal Orthosis)  cervical collar, hard;LSO (lumbar sacral orthosis)  -LC     Row Name             Wound 03/03/21 1446 Right anterior neck Incision    Wound - Properties Group Placement Date: 03/03/21  -HW Placement Time: 1446  -HW Present on Hospital Admission: N  -HW Side: Right  -TANIKA Orientation: anterior  -HW Location: neck  -HW Primary Wound Type: Incision  -HW    Retired Wound - Properties Group Date first assessed: 03/03/21  -HW Time first assessed: 1446  -HW Present on Hospital Admission: N  -HW Side: Right  -TANIKA Location: neck  -HW Primary Wound Type: Incision  -HW    Row Name 03/25/21 0814          Wound 02/09/21 1715 Right hip Incision    Wound - Properties Group Placement Date: 02/09/21  -SH Placement Time: 1715  -SH Present on Hospital Admission: Y  -SH Side: Right  -SH Location: hip  -SH Primary Wound Type: Incision  -SH    Dressing Appearance  intact  -     Care, Wound  negative pressure wound therapy  -     Wound Output (mL)  300 350 TOTAL  -     Retired Wound - Properties Group Date first assessed: 02/09/21  -SH Time first assessed: 1715  -SH Present on Hospital Admission: Y  -SH Side: Right  -SH Location: hip  -SH Primary Wound Type: Incision  -SH    Row Name             Wound 03/23/21 1021 lumbar spine Incision    Wound - Properties Group Placement Date: 03/23/21  -KINGS Placement Time: 1021  -KINGS Location: lumbar spine  -KINGS Primary Wound Type: Incision  -KINGS    Retired Wound - Properties Group Date first assessed: 03/23/21  -KINGS Time first assessed: 1021  -KINGS Location: lumbar spine  -KINGS Primary Wound Type:  Incision  -KINGS    Row Name 03/25/21 0814          NPWT (Negative Pressure Wound Therapy) 02/09/21 1741 right hip    NPWT (Negative Pressure Wound Therapy) - Properties Group Placement Date: 02/09/21 -SH Placement Time: 1741 -SH Location: right hip  -SH Additional Comments: 1 pc of black sponge  -SH    Therapy Setting  continuous therapy  -AH     Instillation  normal saline  -AH     Pressure Setting  125 mmHg  -AH     Retired NPWT (Negative Pressure Wound Therapy) - Properties Group Placement Date: 02/09/21 -SH Placement Time: 1741 -SH Location: right hip  -SH Additional Comments: 1 pc of black sponge  -SH    Row Name 03/25/21 0814          Plan of Care Review    Plan of Care Reviewed With  patient  -     Progress  no change  -     Outcome Summary  pt c/o pain in R hip incision, and soreness in chest with amb, pt performed BLE LAQ,QS,AP, sit-stand min assist, pt amb 10 feet x 2 with rwx ccga-min assist, pt required a sitting rest due to R hip pain and weakness, wound vac dressing intact, will change dressing Friday/PRN, pt would benefit from rehab  -     Row Name 03/25/21 0814          Positioning and Restraints    Pre-Treatment Position  sitting in chair/recliner  -     Post Treatment Position  chair  -     In Chair  reclined;call light within reach;encouraged to call for assist;[place] [facility]  -       User Key  (r) = Recorded By, (t) = Taken By, (c) = Cosigned By    Initials Name Provider Type     Olga Chambers, PTA Physical Therapy Assistant    Ewelina Eubanks, RN Registered Nurse    Esequiel Roche, RN Registered Nurse    Bianca Lundberg PTA Physical Therapy Assistant    Laurie Ritter, RN Registered Nurse    Benjie Melendez, RN Registered Nurse        Physical Therapy Education                 Title: PT OT SLP Therapies (In Progress)     Topic: Physical Therapy (In Progress)     Point: Mobility training (Done)     Learning Progress Summary           Patient Acceptance, E,TB, VU by   at 3/24/2021 1023    Comment: trans    Acceptance, E, VU,NR by SB at 3/23/2021 1603    Comment: pt edu on POC, benefits of act, dc plans, LSO brace, spinal precautions, purpose of NPWT   Family Acceptance, E, VU,NR by SB at 3/23/2021 1603    Comment: pt edu on POC, benefits of act, dc plans, LSO brace, spinal precautions, purpose of NPWT                   Point: Home exercise program (Not Started)     Learner Progress:  Not documented in this visit.          Point: Body mechanics (Done)     Learning Progress Summary           Patient Acceptance, E, VU,NR by SB at 3/23/2021 1603    Comment: pt edu on POC, benefits of act, dc plans, LSO brace, spinal precautions, purpose of NPWT   Family Acceptance, E, VU,NR by SB at 3/23/2021 1603    Comment: pt edu on POC, benefits of act, dc plans, LSO brace, spinal precautions, purpose of NPWT                   Point: Precautions (Done)     Learning Progress Summary           Patient Acceptance, E, VU,NR by SB at 3/23/2021 1603    Comment: pt edu on POC, benefits of act, dc plans, LSO brace, spinal precautions, purpose of NPWT   Family Acceptance, E, VU,NR by SB at 3/23/2021 1603    Comment: pt edu on POC, benefits of act, dc plans, LSO brace, spinal precautions, purpose of NPWT                               User Key     Initials Effective Dates Name Provider Type Discipline     08/02/16 -  Olga Chambers PTA Physical Therapy Assistant PT    SB 10/31/19 -  Alyssa Gallardo PT DPT Physical Therapist PT              PT Recommendation and Plan        Outcome Measures     Row Name 03/24/21 1400             How much help from another is currently needed...    Putting on and taking off regular lower body clothing?  2  -TS      Bathing (including washing, rinsing, and drying)  2  -TS      Toileting (which includes using toilet bed pan or urinal)  3  -TS      Putting on and taking off regular upper body clothing  3  -TS      Taking care of personal grooming (such as brushing teeth)   3  -TS      Eating meals  4  -TS      AM-PAC 6 Clicks Score (OT)  17  -TS        User Key  (r) = Recorded By, (t) = Taken By, (c) = Cosigned By    Initials Name Provider Type     Carolee Giordano, GALLITO/L Occupational Therapy Assistant           Time Calculation:   PT Charges     Row Name 03/25/21 1529 03/25/21 0851          Time Calculation    Start Time  1502  -  0814  -     Stop Time  1525  -  0837  -     Time Calculation (min)  23 min  -  23 min  -     PT Received On  03/25/21  -  03/25/21  -        Time Calculation- PT    Total Timed Code Minutes- PT  23 minute(s)  -  23 minute(s)  -        Timed Charges    42468 - Gait Training Minutes   --  23  -AH       User Key  (r) = Recorded By, (t) = Taken By, (c) = Cosigned By    Initials Name Provider Type     Olga Chambers, SERENA Physical Therapy Assistant     Bianca Ruelas PTA Physical Therapy Assistant        Therapy Charges for Today     Code Description Service Date Service Provider Modifiers Qty    06704404647 HC GAIT TRAINING EA 15 MIN 3/25/2021 Bianca Ruelas PTA GP 2          PT G-Codes  Outcome Measure Options: AM-PAC 6 Clicks Daily Activity (OT)  AM-PAC 6 Clicks Score (PT): 17  AM-PAC 6 Clicks Score (OT): 17    Bianca Ruelas PTA  3/25/2021

## 2021-03-25 NOTE — PLAN OF CARE
Goal Outcome Evaluation:  Plan of Care Reviewed With: patient  Progress: no change  Outcome Summary: pt c/o pain in R hip incision, and soreness in chest with amb, pt performed BLE LAQ,QS,AP, sit-stand min assist, pt amb 10 feet x 2 with rwx ccga-min assist, pt required a sitting rest due to R hip pain and weakness, wound vac dressing intact, will change dressing Friday/PRN, pt would benefit from rehab

## 2021-03-25 NOTE — PROGRESS NOTES
"Pharmacy Dosing Service  Pharmacokinetics  Vancomycin Follow-up Evaluation    Assessment/Action/Plan:  Current Order: Vancomycin 1250 mg IVPB every 12 hours  Current end date:4/2/21  Levels: Sub therapeutic on 3/24/21, dose adjusted.  New trough level ordered, due 1000 3/26/21.  Additional antimicrobial agent(s): Ancef    Renal function stable.  Dose adjusted on 3/24.  Trough level ordered, due 1000 3/26/21.  Continue current dose of Vancomycin 1250 mg iv q 12hrs. Pharmacy will continue to follow daily and adjust dose accordingly.     Subjective:  Tawny Shin is a 65 y.o. female currently on Vancomycin 1250 mg IV every 12 hours for the treatment of Osteomyelitis, day 3 of treatment.    AUC Model Data:  Regimen: 1250 mg every 12 hours   Exposure target: AUC24 (range)400-600 mg/L.hr  AUC24,ss: 455 mg/L.hr  PAUC*: 74 %  Ctrough,ss: 11.6 mg/L  Pconc*: 0 %  Tox.: 7 %    Objective:  Ht: 167.6 cm (66\"); Wt: 94.2 kg (207 lb 11.2 oz)  Estimated Creatinine Clearance: 79 mL/min (A) (by C-G formula based on SCr of 0.4 mg/dL (L)).   Creatinine   Date Value Ref Range Status   03/25/2021 0.40 (L) 0.57 - 1.00 mg/dL Final   03/24/2021 0.32 (L) 0.57 - 1.00 mg/dL Final   03/23/2021 0.35 (L) 0.57 - 1.00 mg/dL Final   07/08/2020 0.90 0.60 - 1.30 mg/dL Final     Comment:     Serial Number: [id number]:  [id number]   09/03/2019 0.7 0.5 - 0.9 mg/dL Final   01/14/2019 0.5 0.5 - 0.9 mg/dL Final   01/07/2019 0.6 0.5 - 0.9 mg/dL Final      Lab Results   Component Value Date    WBC 5.29 03/25/2021    WBC 4.71 03/24/2021    WBC 5.41 03/23/2021         Lab Results   Component Value Date    VANCOTROUGH 9.20 03/24/2021       Culture Results:  Microbiology Results (last 10 days)       Procedure Component Value - Date/Time    Wound Culture - Wound, Spine, Lumbar [[id number]] Collected: 03/23/21 1054    Lab Status: Preliminary result Specimen: Wound from Spine, Lumbar Updated: 03/25/21 0724     Wound Culture No growth at 2 days     Gram Stain No " WBCs or organisms seen    AFB Culture - Body Fluid, Spine, Lumbar [[id number]] Collected: 03/22/21 1708    Lab Status: Preliminary result Specimen: Body Fluid from Spine, Lumbar Updated: 03/24/21 1535     AFB Stain No acid fast bacilli seen on direct smear      No acid fast bacilli seen on concentrated smear    Body Fluid Culture - Body Fluid, Spine, Lumbar [[id number]]  (Abnormal)  (Susceptibility) Collected: 03/22/21 1708    Lab Status: Final result Specimen: Body Fluid from Spine, Lumbar Updated: 03/25/21 0719     Body Fluid Culture Light growth (2+) Staphylococcus aureus     Gram Stain Many (4+) WBCs seen      Few (2+) Gram positive cocci    Susceptibility        Staphylococcus aureus      LICHA      Gentamicin Susceptible      Oxacillin Susceptible      Rifampin Susceptible      Vancomycin Susceptible                 Linear View                       COVID-19, [facility] IN-HOUSE,NASAL Swab (NO TRANSPORT MEDIA) 2 HR TAT - Swab, Nasopharynx [[id number]]  (Normal) Collected: 03/22/21 0549    Lab Status: Final result Specimen: Swab from Nasopharynx Updated: 03/22/21 0639     COVID19 Presumptive Negative    Narrative:      Fact sheet for providers: https://www.fda.gov/media/028956/download     Fact sheet for patients: https://www.fda.gov/media/280884/download    Test performed by PCR.  If inconsistent with clinical signs and symptoms patient should be tested with different authorized molecular test.    COVID-19, [facility] IN-HOUSE,NASAL Swab (NO TRANSPORT MEDIA) 2 HR TAT - Swab, Nasopharynx [[id number]]  (Normal) Collected: 03/19/21 0919    Lab Status: Final result Specimen: Swab from Nasopharynx Updated: 03/19/21 1003     COVID19 Presumptive Negative    Narrative:      Fact sheet for providers: https://www.fda.gov/media/066538/download     Fact sheet for patients: https://www.fda.gov/media/200338/download    Test performed by PCR.  If inconsistent with clinical signs and symptoms patient should be tested with different  authorized molecular test.            KARUNA Bee [facility]   03/25/21 09:10 CDT

## 2021-03-25 NOTE — OP NOTE
NEUROSURGERY OPERATIVE NOTE    Tawny Shin  OR Date: 3/25/2021    Pre-op Diagnosis:   Discitis, unspecified, lumbar region [M46.46]  Spinal stenosis, lumbar region, with neurogenic claudication [M48.062]  Discitis of lumbar region [M46.46]  Osteomyelitis of lumbar spine (CMS/HCC) [M46.26]    Post-op Diagnosis:     Post-Op Diagnosis Codes:     * Discitis, unspecified, lumbar region [M46.46]     * Spinal stenosis, lumbar region, with neurogenic claudication [M48.062]     * Discitis of lumbar region [M46.46]     * Osteomyelitis of lumbar spine (CMS/HCC) [M46.26]          Surgeon(s):  Bandar Shea MD    Anesthesia: General    Staff:   Circulator: Esequiel Keyes RN; Benjie Motta RN  Scrub Person: Molly Vazquez; Nathalie Powers; Juve López    Procedure(s):  LUMBAR DISCECTOMY MICRO, Lumbar 1/2 right    1.  Lumbar hemilaminotomy, medial facetectomy and foraminotomy with discectomy for herniated lumbar disc.  2.  Use of intraoperative microscope    INDICATIONS FOR PROCEDURE:   Tawny Shin is a 65 y.o. with a significant comorbidity rheumatoid arthritis on disease modifying agents, prior lumbar fusion by Dr. Oropeza in 2018 and cervical corpectomy by myself.  She presents with a new problem of postop surgical infection of her right hip with wound VAC dressing, progressive right lower extremity proximal weakness without numbness but radiation to the right hip. Physical exam findings of right iliopsoas weakness 2/2 right iliopsoas abscess, and global hyperreflexia with Babinski and right Patience's and decreased  strength bilaterally.  Their imaging shows CT of the cervical spine with severe cervical stenosis at severe stenosis C5-6 and C6/7 and adjacent segment disease at L1/2 and L2/3 with concern for osteomyelitis discitis.    The risks and benefits of the procedure were discussed. The patient accepted and understood all potential risks and complications including infection, CSF leak,  hematoma, damage to nerve roots or spinal cord, bowel or bladder incontinence, difficulty walking or weakness.  After considering options, the patient requested that we proceed with the procedure.    DESCRIPTION OF OPERATION:   On the day of surgery, the patient was brought to the preoperative holding area where IV access was obtained. Prophylactic intravenous antibiotics were administered. The patient was then brought to the major operative suite.     While on the [facility] [place], the patient underwent an uneventful induction of general anesthetic with placement of endotracheal tube. TEDs and SCD hoses were applied.  The patient was then placed in prone position on a Omari table.  All pressure points were inspected and appropriately padded, and the patient was secured to the table. Her back was sterilely prepped with alcohol.  ChloraPrep was then applied and allowed to dry for 3 minutes, and the patient was draped in the usual fashion.  At this point a WHO surgical timeout was performed with all members of the surgical team.      With the patient's relevant imaging dominantly displayed, lateral fluoroscopy was brought into the field and an 18-gauge spinal needle was inserted 1 cm right of midline and used to localize the L1/2 disc space.  The skin was infused with 0.25% Marcaine with epinephrine.  A #10 blade was used to incise the skin and the initial dilator was placed and docked against the lamina spinous process junction.  At this point a lateral image was obtained to ensure correct level, and then swung into the AP position to ensure that we had not crossed midline.    Once confirmed serial dilation was performed.  A final tube size 20 mm was placed and the internal dilators were removed.  Microscope was then brought into the field and the small amount of remaining paraspinal muscles were removed with Bovie cautery thus exposing the lamina.  Under magnification and using a 3 mm side cutting angel the lamina  and medial aspect of the facet at L1/2 was removed exposing the ligamentum flavum.    Then 2 mm and 3 mm Kerrisons were used to widen the exposure.  A straight curette was used to puncture the ligamentum flavum exposing dura.  The 2 mm Kerrison was then used to remove the ligamentum flavum.  At this point rolf purulence was noted extruding from anterior to the thecal sac.  Cultures were sent.  A #4 Penfield was then used to mobilize the thecal sac and nerve root medially exposing the disc space.  Bipolar cautery was utilized to coagulate epidural veins overlying the disc space.  The 4 Penfield was advanced into the disc space and lateral fluoroscopy was used to again confirm the L1/2 level.  There was nuclear material extruding from the hole of the annulus and the disc space appeared frankly purulent. An annulotomy was performed with #11 blade.  Then a discectomy was performed with pituitary rongeurs, Yandel curettes, and nerve hook.  There was an extruded piece of disk lying against the lateral aspect of the L1/2 disc space.     The wound was then copiously irrigated.  Meticulous hemostasis was again achieved.  A small piece of Gelfoam was placed overlying the nerve root and dura.  The dura was inspected and found to be intact.  The tubular retractor was then slowly removed taking care to address hemostasis in the muscles fascia and subcutaneous tissues on the way out.  A 7F flat GUILLERMO was left in place.  A 0 Vicryl on a UR6 needle was used to close the fascia.  The subcutaneous tissues were closed with 2-0 Vicryl.  A 4-0 Monocryl was used to close the skin.  The patient was awoken and found to be at her neurological baseline.  She was successfully extubated and transferred to the postoperative care unit in stable condition.      Estimated Blood Loss: minimal    Complications: None    Implants:   Implant Name Type Inv. Item Serial No.  Lot No. LRB No. Used Action   HEMOST ABS SURGIFOAM  8X12 10MM -  [id number] Implant HEMOST ABS SURGIFOAM  8X12 10MM  [employer] [id number]  1 Implanted   KT HEMOST ABS SURGIFOAM PORCN 1GRAM - [id number] Implant KT HEMOST ABS SURGIFOAM PORCN 1GRAM  [employer]  [employer] [id number]  1 Implanted       Specimens:                Specimens     ID Source Type Tests Collected By Collected At Frozen?    1 Spine, Lumbar Wound · WOUND CULTURE   Bandar Shea MD 3/23/21 1054     Description: Lumbar spine culture    A Spine, Lumbar Disc · TISSUE PATHOLOGY EXAM   Bandar Shea MD 3/23/21 1104 No    Description: L1-L2            Drains:   Closed/Suction Drain 1 Back Bulb 10 Fr. (Active)   Site Description Unable to view 03/25/21 0600   Dressing Status Clean;Dry;Intact 03/25/21 0600   Drainage Appearance Bloody 03/25/21 0600   Status To bulb suction 03/25/21 0600   Output (mL) 10 mL 03/25/21 0600       [REMOVED] Closed/Suction Drain 1 Anterior Neck Bulb 10 Fr. (Removed)   Site Description Unable to view 03/06/21 0815   Dressing Status Clean;Dry;Intact 03/06/21 0815   Drainage Appearance Serosanguineous 03/06/21 0815   Status To bulb suction 03/06/21 0815   Output (mL) 20 mL 03/05/21 1800       [REMOVED] Urethral Catheter Silicone 16 Fr. (Removed)   Daily Indications Required activity restriction from trauma, surgery, (e.g. unstable spine, fracture, hemodynamics) 03/04/21 0800   Site Assessment Clean;Skin intact 03/04/21 0800   Collection Container Standard drainage bag 03/04/21 0800   Securement Method Securing device 03/04/21 0800   Catheter care complete Yes 03/04/21 0800   Output (mL) 425 mL 03/04/21 0800

## 2021-03-25 NOTE — PROGRESS NOTES
NEUROSURGERY DAILY PROGRESS NOTE    ASSESSMENT:   Tawny Shin is a 65 y.o. with a significant comorbidity rheumatoid arthritis on disease modifying agents, prior lumbar fusion by Dr. Oropeza in 2018.  She presents with a new problem of postop surgical infection of her right hip with wound VAC dressing, progressive right lower extremity proximal weakness without numbness or radiculopathy. Physical exam findings of right iliopsoas weakness, and global hyperreflexia with Babinski and right Patience's and decreased  strength bilaterally.  Their imaging shows CT of the cervical spine with severe cervical stenosis at severe stenosis C5-6 and C6/7 and adjacent segment disease at L1/2 and L2/3 with concern for osteomyelitis discitis.    Past Medical History:   Diagnosis Date   • Arthritis    • Asthma    • Chronic sinusitis    • Coronary artery disease    • Eustachian tube dysfunction    • Heart disease    • Herpes simplex    • Hyperlipidemia    • Hypertension    • Knee dislocation    • Labral tear of right hip joint    • Laryngitis sicca    • Laryngitis, chronic    • MI (myocardial infarction) (CMS/HCC)    • Otorrhea    • Sensorineural hearing loss    • Sjogren's disease (CMS/HCC)      Active Hospital Problems    Diagnosis    • Discitis, unspecified, lumbar region    • Iliopsoas abscess (CMS/HCC)    • Stenosis of cervical spine with myelopathy (CMS/[facility])      Added automatically from request for surgery [id number]     • Spinal stenosis, lumbar region, with neurogenic claudication      Added automatically from request for surgery [id number]     • Osteomyelitis of lumbar spine (CMS/HCC)      Added automatically from request for surgery [id number]     • Discitis of lumbar region      Added automatically from request for surgery [id number]     • Staphylococcus aureus bacteremia    • Obesity (BMI 30-39.9)    • Cardiac pacemaker syndrome      Overview:   - heart block  - implanted 11/16     • Seropositive rheumatoid arthritis of multiple  "sites (CMS/[facility])      Overview:   -myochrysine '93-'96  -methotrexate '96--->11/98;r/s  restarted 2/99--> 8/14 (anemia)  -sulfasalazine- not effective  -penicillamine 6/98-->10/98; no effect  -leflunomide 11/98-->  - Humira '13-->didn't take  - Enbrel 12/14-->3/15- no effect!      Last Assessment & Plan:   - \"aching all over\" because she had to be off her anti-rheumatic drugs for 2 weeks in preparation for her R knee surgery  - her R knee surgery got cancelled because she developed cellulitis of her L arm (with fever). She was started on Bactrim, and then transitioned to doxycyline on Aug 1 2018 (3 week course) and her R knee surgery was postponed to 1 month from now.   - As such, she restarted her anti-rheumatics on Aug 1 2018.  - She will have to hold her anti-rheumatics again 2 weeks before her surgery  - She feels her medications are otherwise working well. No changes to be made to her current regimen, which is Humira injection q2 week, leflunomide (Arava), and salicylate     - Pt is concerned that when she transitions insurance in December of this year, she will lose her salicylate. We had a discussion and expressed that it would be best for her to stay on salicylate because we do not want the blood thinning effects of aspirin. Dr. Walden will plan to write a prior authorization for her for salicylate.     • Coronary artery disease      PCI RCA '10, NSTEMI requiring LAD PCI in '13    [facility] 9/3/19 at [facility]: no significant dz; stents patent     • S/P coronary artery stent placement      PLAN:   Neuro: Stable.  Neuro unchanged, right IP weakness   Continue neurochecks every 4 hours     Concern for discitis/osteomyelitis L1/2  L1/2 severe central canal stenosis  Severe stenosis from adjacent segment disease   POD# 2 right L4-5 microdiscectomy    GUILLERMO= 60 ml   Surgical wound culture pending     Right psoas abscess              I&D right psoas abscess performed per IR 3/2/2021 and 3/22/2021   Body fluid culture: 4+ WBCs, " "2+ gram-positive cocci.  Light growth staph   Fungus culture: Pending   Anaerobic culture: Pending   ABF culture: Pending    Cervical stenosis                       POD #19 C6 corpectomy   Continue cervical collar   Intermittent difficulty with swallowing.   Incision: C, D, I    CV: VS WNL.  Sudden chest pain with ambulation.  Currently resolved.  Stat EKG  Pulm: No acute issues.  Maintaining O2 sat.  Continuous pulse oximetry  : No acute issues.  Voiding spontaneously  FEN: Tolerating regular diet.  GI: No acute issues.  ORTHO: Open wound right posterior hip.  MSSA infection, wound vac per PT  ID: Continue cefazolin per ID   Postop vancomycin, pharmacy to dose  Heme:  DVT prophylaxis held for operative procedure; SCDs  Pain: Tolerable at present  Dispo: PT/OT with LSO brace   Pending pre-CERT with [facility]    CHIEF COMPLAINT:   Lumbar back/right hip and anterior right thigh pain    Subjective  Symptom stable.  Doing well    Temp:  [97.7 °F (36.5 °C)-98 °F (36.7 °C)] 97.7 °F (36.5 °C)  Heart Rate:  [74-91] 88  Resp:  [16-18] 18  BP: (139-175)/(51-66) 174/63    Objective:  General Appearance:  Well-appearing and in no acute distress.    Vital signs: (most recent): Blood pressure 174/63, pulse 88, temperature 97.7 °F (36.5 °C), temperature source Oral, resp. rate 18, height 167.6 cm (66\"), weight 94.2 kg (207 lb 11.2 oz), SpO2 93 %, not currently breastfeeding.      Neurologic Exam     Mental Status   Oriented to person, place, and time.   Attention: normal. Concentration: normal.   Speech: speech is normal   Level of consciousness: alert    Bright and awake.  Oriented x3.  Follows commands without prompting showing thumbs up into fingers bilaterally.     Cranial Nerves     CN II   Visual fields full to confrontation.     CN III, IV, VI   Pupils are equal, round, and reactive to light.  Extraocular motions are normal.     CN V   Facial sensation intact.     CN VII   Facial expression full, symmetric.     CN VIII "   CN VIII normal.     CN IX, X   CN IX normal.     CN XI   CN XI normal.     Motor Exam     Strength   Right deltoid: 5/5  Left deltoid: 5/5  Right biceps: 5/5  Left biceps: 5/5  Right triceps: 5/5  Left triceps: 5/5  Right wrist extension: 5/5  Left wrist extension: 5/5  Right interossei: 4/5  Left interossei: 4/5  Right iliopsoas: 2/5  Left iliopsoas: 5/5  Right quadriceps: 2/5  Left quadriceps: 5/5  Right anterior tibial: 3/5  Left anterior tibial: 5/5  Right gastroc: 4/5  Left gastroc: 5/5       Sensory Exam   Sensory deficit distribution on right: L1    Gait, Coordination, and Reflexes     Tremor   Resting tremor: absent  Intention tremor: absent  Action tremor: absent    Reflexes   Right plantar: upgoing  Left plantar: normal  Right Mims: present  Left Mims: absent  Right ankle clonus: present  Left ankle clonus: present  Right pendular knee jerk: absent  Left pendular knee jerk: absent    Drains:   [REMOVED] Closed/Suction Drain 1 Anterior Neck Bulb 10 Fr. (Removed)   Site Description Unable to view 03/06/21 0815   Dressing Status Clean;Dry;Intact 03/06/21 0815   Drainage Appearance Serosanguineous 03/06/21 0815   Status To bulb suction 03/06/21 0815   Output (mL) 20 mL 03/05/21 1800       [REMOVED] Urethral Catheter Silicone 16 Fr. (Removed)   Daily Indications Required activity restriction from trauma, surgery, (e.g. unstable spine, fracture, hemodynamics) 03/04/21 0800   Site Assessment Clean;Skin intact 03/04/21 0800   Collection Container Standard drainage bag 03/04/21 0800   Securement Method Securing device 03/04/21 0800   Catheter care complete Yes 03/04/21 0800   Output (mL) 425 mL 03/04/21 0800       Imaging Results (Last 24 Hours)     ** No results found for the last 24 hours. **        Lab Results (last 24 hours)     Procedure Component Value Units Date/Time    Wound Culture - Wound, Spine, Lumbar [[id number]] Collected: 03/23/21 1054    Specimen: Wound from Spine, Lumbar Updated: 03/25/21  0724     Wound Culture No growth at 2 days     Gram Stain No WBCs or organisms seen    Comprehensive Metabolic Panel [[id number]]  (Abnormal) Collected: 03/25/21 0450    Specimen: Blood Updated: 03/25/21 0544     Glucose 86 mg/dL      BUN 7 mg/dL      Creatinine 0.40 mg/dL      Sodium 139 mmol/L      Potassium 3.7 mmol/L      Chloride 102 mmol/L      CO2 30.0 mmol/L      Calcium 8.7 mg/dL      Total Protein 6.0 g/dL      Albumin 2.50 g/dL      ALT (SGPT) <5 U/L      AST (SGOT) 13 U/L      Alkaline Phosphatase 86 U/L      Total Bilirubin 0.3 mg/dL      eGFR Non African Amer >150 mL/min/1.73      Globulin 3.5 gm/dL      A/G Ratio 0.7 g/dL      BUN/Creatinine Ratio 17.5     Anion Gap 7.0 mmol/L     Narrative:      GFR Normal >60  Chronic Kidney Disease <60  Kidney Failure <15      C-reactive Protein [[id number]]  (Abnormal) Collected: 03/25/21 0450    Specimen: Blood Updated: 03/25/21 0544     C-Reactive Protein 5.37 mg/dL     CBC (No Diff) [[id number]]  (Abnormal) Collected: 03/25/21 0450    Specimen: Blood Updated: 03/25/21 0520     WBC 5.29 10*3/mm3      RBC 3.53 10*6/mm3      Hemoglobin 9.7 g/dL      Hematocrit 31.8 %      MCV 90.1 fL      MCH 27.5 pg      MCHC 30.5 g/dL      RDW 16.7 %      RDW-SD 53.3 fl      MPV 9.6 fL      Platelets 264 10*3/mm3     Vancomycin, Trough [[id number]]  (Normal) Collected: 03/24/21 2034    Specimen: Blood Updated: 03/24/21 2111     Vancomycin Trough 9.20 mcg/mL         [id number]  Taiwo Prado, APRN

## 2021-03-25 NOTE — PROGRESS NOTES
Continued Stay Note  [facility]     Patient Name: Tawny Shin  MRN: [id number]  Today's Date: 3/25/2021    Admit Date: 3/22/2021    Discharge Plan     Row Name 03/25/21 1128       Plan    Plan Comments  [facility], bed is ready when pt is stable for DC. Will need final ID orders prior to dc.    Final Discharge Disposition Code  62 - inpatient rehab facility        Discharge Codes    No documentation.             OSIEL Pizano

## 2021-03-26 ENCOUNTER — HOSPITAL ENCOUNTER (INPATIENT)
Age: 66
LOS: 17 days | Discharge: NURSING, REHAB OR LONG-TERM CARE | DRG: 948 | End: 2021-04-12
Attending: PSYCHIATRY & NEUROLOGY | Admitting: PSYCHIATRY & NEUROLOGY
Payer: MEDICARE

## 2021-03-26 VITALS
WEIGHT: 207.7 LBS | BODY MASS INDEX: 33.38 KG/M2 | HEIGHT: 66 IN | DIASTOLIC BLOOD PRESSURE: 68 MMHG | RESPIRATION RATE: 18 BRPM | TEMPERATURE: 97.6 F | SYSTOLIC BLOOD PRESSURE: 171 MMHG | OXYGEN SATURATION: 97 % | HEART RATE: 79 BPM

## 2021-03-26 DIAGNOSIS — M46.47 DISCITIS OF LUMBOSACRAL REGION: Primary | ICD-10-CM

## 2021-03-26 PROBLEM — M46.40 DISCITIS: Status: ACTIVE | Noted: 2021-03-26

## 2021-03-26 LAB
BACTERIA SPEC AEROBE CULT: NORMAL
BASOPHILS ABSOLUTE: 0 K/UL (ref 0–0.2)
BASOPHILS RELATIVE PERCENT: 0.5 % (ref 0–1)
EOSINOPHILS ABSOLUTE: 0.1 K/UL (ref 0–0.6)
EOSINOPHILS RELATIVE PERCENT: 1.1 % (ref 0–5)
GRAM STN SPEC: NORMAL
HCT VFR BLD CALC: 33.8 % (ref 37–47)
HEMOGLOBIN: 10.4 G/DL (ref 12–16)
IMMATURE GRANULOCYTES #: 0 K/UL
LYMPHOCYTES ABSOLUTE: 1.4 K/UL (ref 1.1–4.5)
LYMPHOCYTES RELATIVE PERCENT: 21.9 % (ref 20–40)
MCH RBC QN AUTO: 28.5 PG (ref 27–31)
MCHC RBC AUTO-ENTMCNC: 30.8 G/DL (ref 33–37)
MCV RBC AUTO: 92.6 FL (ref 81–99)
MONOCYTES ABSOLUTE: 0.9 K/UL (ref 0–0.9)
MONOCYTES RELATIVE PERCENT: 14.1 % (ref 0–10)
NEUTROPHILS ABSOLUTE: 3.8 K/UL (ref 1.5–7.5)
NEUTROPHILS RELATIVE PERCENT: 61.9 % (ref 50–65)
PDW BLD-RTO: 16.6 % (ref 11.5–14.5)
PLATELET # BLD: 221 K/UL (ref 130–400)
PMV BLD AUTO: 9.1 FL (ref 9.4–12.3)
PREALBUMIN: 12 MG/DL (ref 20–40)
RBC # BLD: 3.65 M/UL (ref 4.2–5.4)
VANCOMYCIN TROUGH SERPL-MCNC: 13.3 MCG/ML (ref 5–20)
WBC # BLD: 6.2 K/UL (ref 4.8–10.8)

## 2021-03-26 PROCEDURE — 25010000002 VANCOMYCIN 10 G RECONSTITUTED SOLUTION: Performed by: NEUROLOGICAL SURGERY

## 2021-03-26 PROCEDURE — 97535 SELF CARE MNGMENT TRAINING: CPT

## 2021-03-26 PROCEDURE — 6370000000 HC RX 637 (ALT 250 FOR IP): Performed by: PSYCHIATRY & NEUROLOGY

## 2021-03-26 PROCEDURE — 80202 ASSAY OF VANCOMYCIN: CPT | Performed by: INTERNAL MEDICINE

## 2021-03-26 PROCEDURE — 97116 GAIT TRAINING THERAPY: CPT

## 2021-03-26 PROCEDURE — 84134 ASSAY OF PREALBUMIN: CPT

## 2021-03-26 PROCEDURE — 25010000002 HEPARIN (PORCINE) PER 1000 UNITS: Performed by: NURSE PRACTITIONER

## 2021-03-26 PROCEDURE — 36415 COLL VENOUS BLD VENIPUNCTURE: CPT

## 2021-03-26 PROCEDURE — 25010000002 CEFAZOLIN PER 500 MG: Performed by: INTERNAL MEDICINE

## 2021-03-26 PROCEDURE — 99024 POSTOP FOLLOW-UP VISIT: CPT | Performed by: NURSE PRACTITIONER

## 2021-03-26 PROCEDURE — 63710000001 PREDNISONE PER 5 MG: Performed by: NURSE PRACTITIONER

## 2021-03-26 PROCEDURE — 2580000003 HC RX 258: Performed by: PSYCHIATRY & NEUROLOGY

## 2021-03-26 PROCEDURE — 97606 NEG PRS WND THER DME>50 SQCM: CPT

## 2021-03-26 PROCEDURE — 1180000000 HC REHAB R&B

## 2021-03-26 PROCEDURE — 6360000002 HC RX W HCPCS: Performed by: PSYCHIATRY & NEUROLOGY

## 2021-03-26 PROCEDURE — 85025 COMPLETE CBC W/AUTO DIFF WBC: CPT

## 2021-03-26 PROCEDURE — 97535 SELF CARE MNGMENT TRAINING: CPT | Performed by: OCCUPATIONAL THERAPIST

## 2021-03-26 RX ORDER — NALOXONE HYDROCHLORIDE 0.4 MG/ML
0.1 INJECTION, SOLUTION INTRAMUSCULAR; INTRAVENOUS; SUBCUTANEOUS EVERY 5 MIN PRN
Status: ON HOLD | COMMUNITY
End: 2021-10-28

## 2021-03-26 RX ORDER — ACETAMINOPHEN 325 MG/1
650 TABLET ORAL EVERY 4 HOURS PRN
Status: DISCONTINUED | OUTPATIENT
Start: 2021-03-26 | End: 2021-04-12 | Stop reason: HOSPADM

## 2021-03-26 RX ORDER — HEPARIN SODIUM 5000 [USP'U]/ML
5000 INJECTION, SOLUTION INTRAVENOUS; SUBCUTANEOUS EVERY 12 HOURS
Status: ON HOLD | COMMUNITY
End: 2021-10-28

## 2021-03-26 RX ORDER — HEPARIN SODIUM 5000 [USP'U]/ML
5000 INJECTION, SOLUTION INTRAVENOUS; SUBCUTANEOUS EVERY 12 HOURS
Status: DISCONTINUED | OUTPATIENT
Start: 2021-03-26 | End: 2021-04-12 | Stop reason: HOSPADM

## 2021-03-26 RX ORDER — POLYETHYLENE GLYCOL 3350 17 G/17G
17 POWDER, FOR SOLUTION ORAL DAILY
Status: DISCONTINUED | OUTPATIENT
Start: 2021-03-26 | End: 2021-03-26 | Stop reason: SDUPTHER

## 2021-03-26 RX ORDER — BUPIVACAINE HCL/0.9 % NACL/PF 0.1 %
2 PLASTIC BAG, INJECTION (ML) EPIDURAL EVERY 8 HOURS
Qty: 9000 ML | Refills: 1
Start: 2021-03-26 | End: 2021-05-06

## 2021-03-26 RX ORDER — BUDESONIDE 0.5 MG/2ML
500 INHALANT ORAL 2 TIMES DAILY PRN
Status: DISCONTINUED | OUTPATIENT
Start: 2021-03-26 | End: 2021-04-12 | Stop reason: HOSPADM

## 2021-03-26 RX ORDER — PANTOPRAZOLE SODIUM 40 MG/1
40 TABLET, DELAYED RELEASE ORAL DAILY
Status: DISCONTINUED | OUTPATIENT
Start: 2021-03-26 | End: 2021-04-12 | Stop reason: HOSPADM

## 2021-03-26 RX ORDER — M-VIT,TX,IRON,MINS/CALC/FOLIC 27MG-0.4MG
1 TABLET ORAL DAILY
Status: DISCONTINUED | OUTPATIENT
Start: 2021-03-26 | End: 2021-04-12 | Stop reason: HOSPADM

## 2021-03-26 RX ORDER — CYCLOBENZAPRINE HCL 10 MG
10 TABLET ORAL 3 TIMES DAILY PRN
Status: ON HOLD | COMMUNITY
End: 2021-10-28

## 2021-03-26 RX ORDER — BISACODYL 10 MG
10 SUPPOSITORY, RECTAL RECTAL DAILY PRN
Status: DISCONTINUED | OUTPATIENT
Start: 2021-03-26 | End: 2021-04-12 | Stop reason: HOSPADM

## 2021-03-26 RX ORDER — NITROGLYCERIN 0.4 MG/1
0.4 TABLET SUBLINGUAL EVERY 5 MIN PRN
Status: DISCONTINUED | OUTPATIENT
Start: 2021-03-26 | End: 2021-04-12 | Stop reason: HOSPADM

## 2021-03-26 RX ORDER — PANTOPRAZOLE SODIUM 40 MG/1
40 TABLET, DELAYED RELEASE ORAL DAILY
Start: 2021-03-26 | End: 2021-06-21 | Stop reason: SDUPTHER

## 2021-03-26 RX ORDER — ACYCLOVIR 200 MG/1
400 CAPSULE ORAL 2 TIMES DAILY
Status: DISCONTINUED | OUTPATIENT
Start: 2021-03-26 | End: 2021-04-12 | Stop reason: HOSPADM

## 2021-03-26 RX ORDER — PREDNISONE 1 MG/1
2 TABLET ORAL DAILY
Status: DISCONTINUED | OUTPATIENT
Start: 2021-03-27 | End: 2021-03-31

## 2021-03-26 RX ORDER — CARVEDILOL 25 MG/1
25 TABLET ORAL 2 TIMES DAILY WITH MEALS
Status: DISCONTINUED | OUTPATIENT
Start: 2021-03-26 | End: 2021-04-12 | Stop reason: HOSPADM

## 2021-03-26 RX ORDER — GUAIFENESIN 600 MG/1
600 TABLET, EXTENDED RELEASE ORAL EVERY 12 HOURS SCHEDULED
Start: 2021-03-26 | End: 2021-07-01

## 2021-03-26 RX ORDER — CLONIDINE HYDROCHLORIDE 0.1 MG/1
0.1 TABLET ORAL 2 TIMES DAILY PRN
Status: DISCONTINUED | OUTPATIENT
Start: 2021-03-26 | End: 2021-04-12 | Stop reason: HOSPADM

## 2021-03-26 RX ORDER — BISACODYL 10 MG
10 SUPPOSITORY, RECTAL RECTAL DAILY PRN
Status: ON HOLD | COMMUNITY
End: 2021-10-28

## 2021-03-26 RX ORDER — LEVOTHYROXINE SODIUM 0.07 MG/1
75 TABLET ORAL DAILY
Start: 2021-03-26 | End: 2021-06-21 | Stop reason: SDUPTHER

## 2021-03-26 RX ORDER — FUROSEMIDE 40 MG/1
40 TABLET ORAL DAILY
Status: DISCONTINUED | OUTPATIENT
Start: 2021-03-26 | End: 2021-04-12 | Stop reason: HOSPADM

## 2021-03-26 RX ORDER — OXYCODONE AND ACETAMINOPHEN 10; 325 MG/1; MG/1
1 TABLET ORAL EVERY 4 HOURS PRN
Status: DISCONTINUED | OUTPATIENT
Start: 2021-03-26 | End: 2021-03-27

## 2021-03-26 RX ORDER — AMOXICILLIN 250 MG
2 CAPSULE ORAL 2 TIMES DAILY
Status: ON HOLD | COMMUNITY
End: 2021-04-12 | Stop reason: HOSPADM

## 2021-03-26 RX ORDER — GUAIFENESIN 600 MG/1
1200 TABLET, EXTENDED RELEASE ORAL 2 TIMES DAILY
Status: DISCONTINUED | OUTPATIENT
Start: 2021-03-26 | End: 2021-04-12 | Stop reason: HOSPADM

## 2021-03-26 RX ORDER — OXYCODONE AND ACETAMINOPHEN 10; 325 MG/1; MG/1
1 TABLET ORAL EVERY 4 HOURS PRN
Qty: 18 TABLET | Refills: 0 | Status: SHIPPED | OUTPATIENT
Start: 2021-03-26 | End: 2021-03-29

## 2021-03-26 RX ORDER — CYCLOBENZAPRINE HCL 10 MG
10 TABLET ORAL 3 TIMES DAILY PRN
Status: DISCONTINUED | OUTPATIENT
Start: 2021-03-26 | End: 2021-04-12 | Stop reason: HOSPADM

## 2021-03-26 RX ORDER — GABAPENTIN 100 MG/1
100 CAPSULE ORAL EVERY 12 HOURS
Status: ON HOLD | COMMUNITY
End: 2021-04-12 | Stop reason: SDUPTHER

## 2021-03-26 RX ORDER — ISOSORBIDE MONONITRATE 60 MG/1
60 TABLET, EXTENDED RELEASE ORAL 2 TIMES DAILY
Start: 2021-03-26 | End: 2022-05-13

## 2021-03-26 RX ORDER — ISOSORBIDE MONONITRATE 60 MG/1
60 TABLET, EXTENDED RELEASE ORAL 2 TIMES DAILY
Status: DISCONTINUED | OUTPATIENT
Start: 2021-03-26 | End: 2021-04-12 | Stop reason: HOSPADM

## 2021-03-26 RX ORDER — CLONIDINE HYDROCHLORIDE 0.1 MG/1
0.1 TABLET ORAL 2 TIMES DAILY PRN
Start: 2021-03-26 | End: 2021-04-28

## 2021-03-26 RX ORDER — BUDESONIDE 0.5 MG/2ML
1 INHALANT ORAL 2 TIMES DAILY PRN
Status: ON HOLD | COMMUNITY
End: 2021-10-28

## 2021-03-26 RX ORDER — LABETALOL HYDROCHLORIDE 5 MG/ML
20 INJECTION, SOLUTION INTRAVENOUS
Start: 2021-03-26 | End: 2021-04-28

## 2021-03-26 RX ORDER — DOCUSATE SODIUM 100 MG/1
100 CAPSULE, LIQUID FILLED ORAL 2 TIMES DAILY
Status: DISCONTINUED | OUTPATIENT
Start: 2021-03-26 | End: 2021-04-12 | Stop reason: HOSPADM

## 2021-03-26 RX ORDER — ONDANSETRON 4 MG/1
4 TABLET, FILM COATED ORAL EVERY 6 HOURS PRN
Status: ON HOLD | COMMUNITY
End: 2021-10-28

## 2021-03-26 RX ORDER — LIDOCAINE 4 G/G
1 PATCH TOPICAL DAILY
Status: DISCONTINUED | OUTPATIENT
Start: 2021-03-26 | End: 2021-04-12 | Stop reason: HOSPADM

## 2021-03-26 RX ORDER — GUAIFENESIN 600 MG/1
1200 TABLET, EXTENDED RELEASE ORAL 2 TIMES DAILY
Status: ON HOLD | COMMUNITY
End: 2021-10-28

## 2021-03-26 RX ORDER — LIDOCAINE 50 MG/G
1 PATCH TOPICAL DAILY PRN
Status: ON HOLD | COMMUNITY
End: 2021-10-28

## 2021-03-26 RX ORDER — PREDNISONE 1 MG/1
2 TABLET ORAL DAILY
Start: 2021-03-27 | End: 2021-06-22

## 2021-03-26 RX ORDER — OXYCODONE AND ACETAMINOPHEN 10; 325 MG/1; MG/1
1 TABLET ORAL EVERY 4 HOURS PRN
Status: ON HOLD | COMMUNITY
End: 2021-04-12 | Stop reason: HOSPADM

## 2021-03-26 RX ORDER — NITROGLYCERIN 0.4 MG/1
0.4 TABLET SUBLINGUAL
Refills: 12
Start: 2021-03-26

## 2021-03-26 RX ORDER — ONDANSETRON 4 MG/1
4 TABLET, FILM COATED ORAL EVERY 6 HOURS PRN
Status: DISCONTINUED | OUTPATIENT
Start: 2021-03-26 | End: 2021-04-12 | Stop reason: HOSPADM

## 2021-03-26 RX ORDER — GABAPENTIN 100 MG/1
100 CAPSULE ORAL EVERY 12 HOURS
Status: DISCONTINUED | OUTPATIENT
Start: 2021-03-26 | End: 2021-04-12 | Stop reason: HOSPADM

## 2021-03-26 RX ORDER — POLYETHYLENE GLYCOL 3350 17 G/17G
17 POWDER, FOR SOLUTION ORAL DAILY
Status: DISCONTINUED | OUTPATIENT
Start: 2021-03-27 | End: 2021-04-12 | Stop reason: HOSPADM

## 2021-03-26 RX ORDER — LEVOTHYROXINE SODIUM 0.07 MG/1
75 TABLET ORAL DAILY
Status: DISCONTINUED | OUTPATIENT
Start: 2021-03-26 | End: 2021-04-12 | Stop reason: HOSPADM

## 2021-03-26 RX ORDER — ONDANSETRON 4 MG/1
4 TABLET, ORALLY DISINTEGRATING ORAL EVERY 8 HOURS PRN
Status: ON HOLD | COMMUNITY
End: 2021-03-26

## 2021-03-26 RX ORDER — POLYETHYLENE GLYCOL 3350 17 G/17G
17 POWDER, FOR SOLUTION ORAL DAILY
Status: ON HOLD | COMMUNITY
End: 2021-11-03 | Stop reason: HOSPADM

## 2021-03-26 RX ORDER — SENNA AND DOCUSATE SODIUM 50; 8.6 MG/1; MG/1
2 TABLET, FILM COATED ORAL 2 TIMES DAILY
Status: DISCONTINUED | OUTPATIENT
Start: 2021-03-26 | End: 2021-04-12 | Stop reason: HOSPADM

## 2021-03-26 RX ADMIN — Medication 250 MG: at 08:22

## 2021-03-26 RX ADMIN — GABAPENTIN 100 MG: 100 CAPSULE ORAL at 08:25

## 2021-03-26 RX ADMIN — POLYETHYLENE GLYCOL (3350) 17 G: 17 POWDER, FOR SOLUTION ORAL at 08:25

## 2021-03-26 RX ADMIN — DOCUSATE SODIUM 100 MG: 100 CAPSULE, LIQUID FILLED ORAL at 21:47

## 2021-03-26 RX ADMIN — CARVEDILOL 25 MG: 25 TABLET, FILM COATED ORAL at 17:23

## 2021-03-26 RX ADMIN — ACETAMINOPHEN 650 MG: 325 TABLET, FILM COATED ORAL at 06:37

## 2021-03-26 RX ADMIN — DOCUSATE SODIUM 50 MG AND SENNOSIDES 8.6 MG 2 TABLET: 8.6; 5 TABLET, FILM COATED ORAL at 08:22

## 2021-03-26 RX ADMIN — HEPARIN SODIUM 5000 UNITS: 5000 INJECTION INTRAVENOUS; SUBCUTANEOUS at 21:48

## 2021-03-26 RX ADMIN — CEFAZOLIN SODIUM 2 G: 10 INJECTION, POWDER, FOR SOLUTION INTRAVENOUS at 08:18

## 2021-03-26 RX ADMIN — GABAPENTIN 100 MG: 100 CAPSULE ORAL at 21:47

## 2021-03-26 RX ADMIN — HEPARIN SODIUM 5000 UNITS: 5000 INJECTION INTRAVENOUS; SUBCUTANEOUS at 05:07

## 2021-03-26 RX ADMIN — SODIUM CHLORIDE, PRESERVATIVE FREE 10 ML: 5 INJECTION INTRAVENOUS at 08:22

## 2021-03-26 RX ADMIN — PANTOPRAZOLE SODIUM 40 MG: 40 TABLET, DELAYED RELEASE ORAL at 05:07

## 2021-03-26 RX ADMIN — CYCLOBENZAPRINE 10 MG: 10 TABLET, FILM COATED ORAL at 06:36

## 2021-03-26 RX ADMIN — PREDNISONE 2 MG: 1 TABLET ORAL at 08:22

## 2021-03-26 RX ADMIN — CARVEDILOL 12.5 MG: 6.25 TABLET, FILM COATED ORAL at 08:19

## 2021-03-26 RX ADMIN — ISOSORBIDE MONONITRATE 60 MG: 60 TABLET, EXTENDED RELEASE ORAL at 21:47

## 2021-03-26 RX ADMIN — OXYCODONE HYDROCHLORIDE AND ACETAMINOPHEN 1 TABLET: 10; 325 TABLET ORAL at 00:18

## 2021-03-26 RX ADMIN — LEVOTHYROXINE SODIUM 75 MCG: 75 TABLET ORAL at 05:08

## 2021-03-26 RX ADMIN — VANCOMYCIN HYDROCHLORIDE 1250 MG: 10 INJECTION, POWDER, LYOPHILIZED, FOR SOLUTION INTRAVENOUS at 11:50

## 2021-03-26 RX ADMIN — DOCUSATE SODIUM 100 MG: 100 CAPSULE ORAL at 08:22

## 2021-03-26 RX ADMIN — GUAIFENESIN 1200 MG: 600 TABLET, EXTENDED RELEASE ORAL at 21:47

## 2021-03-26 RX ADMIN — GUAIFENESIN 600 MG: 600 TABLET, EXTENDED RELEASE ORAL at 08:22

## 2021-03-26 RX ADMIN — ACYCLOVIR 400 MG: 200 CAPSULE ORAL at 21:47

## 2021-03-26 RX ADMIN — OXYCODONE HYDROCHLORIDE AND ACETAMINOPHEN 1 TABLET: 10; 325 TABLET ORAL at 11:01

## 2021-03-26 RX ADMIN — ISOSORBIDE MONONITRATE 60 MG: 60 TABLET, EXTENDED RELEASE ORAL at 08:19

## 2021-03-26 RX ADMIN — OXYCODONE HYDROCHLORIDE AND ACETAMINOPHEN 1 TABLET: 10; 325 TABLET ORAL at 05:07

## 2021-03-26 RX ADMIN — FUROSEMIDE 40 MG: 40 TABLET ORAL at 08:22

## 2021-03-26 RX ADMIN — LABETALOL HYDROCHLORIDE 20 MG: 5 INJECTION, SOLUTION INTRAVENOUS at 05:03

## 2021-03-26 RX ADMIN — OXYCODONE HYDROCHLORIDE AND ACETAMINOPHEN 1 TABLET: 10; 325 TABLET ORAL at 15:28

## 2021-03-26 RX ADMIN — WATER 2000 MG: 1 INJECTION INTRAMUSCULAR; INTRAVENOUS; SUBCUTANEOUS at 17:00

## 2021-03-26 RX ADMIN — CLONIDINE HYDROCHLORIDE 0.1 MG: 0.1 TABLET ORAL at 00:22

## 2021-03-26 ASSESSMENT — PAIN DESCRIPTION - ORIENTATION: ORIENTATION: MID;LOWER

## 2021-03-26 ASSESSMENT — PAIN DESCRIPTION - LOCATION: LOCATION: BACK

## 2021-03-26 ASSESSMENT — PAIN DESCRIPTION - PROGRESSION: CLINICAL_PROGRESSION: NOT CHANGED

## 2021-03-26 ASSESSMENT — PAIN DESCRIPTION - DESCRIPTORS: DESCRIPTORS: THROBBING

## 2021-03-26 ASSESSMENT — PAIN SCALES - GENERAL
PAINLEVEL_OUTOF10: 8
PAINLEVEL_OUTOF10: 6

## 2021-03-26 NOTE — THERAPY TREATMENT NOTE
Acute Care - Occupational Therapy Treatment Note  [facility]     Patient Name: Tawny Shin  : 1955  MRN: [id number]  Today's Date: 3/26/2021             Admit Date: 3/22/2021       ICD-10-CM ICD-9-CM   1. Discitis, unspecified, lumbar region  M46.46 722.93   2. Spinal stenosis, lumbar region, with neurogenic claudication  M48.062 724.03   3. Discitis of lumbar region  M46.46 722.93   4. Osteomyelitis of lumbar spine (CMS/[facility])  M46.26 730.28   5. Impaired mobility and ADLs  Z74.09 V49.89    Z78.9    6. Impaired mobility  Z74.09 799.89   7. Iliopsoas abscess (CMS/[facility])  K68.12 567.31   8. Staphylococcus aureus bacteremia  R78.81 790.7    B95.61 041.11   9. Spinal stenosis in cervical region  M48.02 723.0     Patient Active Problem List   Diagnosis   • Eustachian tube dysfunction   • Sensorineural hearing loss   • Lumbago of multiple sites in spine with sciatica   • Spondylolisthesis of lumbar region   • Coronary artery disease   • Hyperlipidemia   • Hypertension   • Myalgia due to statin   • S/P coronary artery stent placement   • Pacemaker   • Seropositive rheumatoid arthritis of multiple sites (CMS/[facility])   • Sick sinus syndrome (CMS/[facility])   • Other osteoporosis without current pathological fracture   • Allergic-infective asthma   • Arthritis of both knees   • Vasovagal syncope   • Bilateral bunions   • Bronchitis   • Cardiac pacemaker syndrome   • Charcot's joint of foot, left   • Constipation   • 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   • Intrinsic asthma   • Left carotid bruit   • Neutropenia (CMS/[facility])   • Obesity   • Primary osteoarthritis of left knee   • Psoriasis vulgaris   • Recurrent acute serous otitis media of both ears   • S/P lumbar laminectomy   • Thrombocytopenia (CMS/[facility])   • Hypothyroidism   • Vitamin D deficiency   • Myxedema heart disease   • Spondylolisthesis of lumbar region   • Syncope, cardiogenic   • Rupture of gluteus minimus tendon   • Muscle tear   • Syncope, recurrent   • Leukocytosis    • Headache   • Elevated CPK   • Staphylococcus aureus bacteremia   • Right hip pain, suspected infection   • Obesity (BMI 30-39.9)   • Stenosis of cervical spine with myelopathy (CMS/HCC)   • Spinal stenosis, lumbar region, with neurogenic claudication   • Discitis of lumbar region   • Osteomyelitis of lumbar spine (CMS/HCC)   • Discitis, unspecified, lumbar region   • Iliopsoas abscess (CMS/HCC)     Past Medical History:   Diagnosis Date   • Arthritis    • Asthma    • Chronic sinusitis    • Coronary artery disease    • Eustachian tube dysfunction    • Heart disease    • Herpes simplex    • Hyperlipidemia    • Hypertension    • Knee dislocation    • Labral tear of right hip joint    • Laryngitis sicca    • Laryngitis, chronic    • MI (myocardial infarction) (CMS/[facility])    • Otorrhea    • Sensorineural hearing loss    • Sjogren's disease (CMS/[facility])      Past Surgical History:   Procedure Laterality Date   • A-V CARDIAC PACEMAKER INSERTION  2016   • ATRIAL CARDIAC PACEMAKER INSERTION     • CARDIAC CATHETERIZATION     • CATARACT EXTRACTION     • CERVICAL CORPECTOMY N/A 3/3/2021    Procedure: CERVICAL 6 CORPECTOMY WITH TITANIUM CAGE WITH NEURO MONITORING;  Surgeon: Bandar Shea MD;  Location:  [facility] OR;  Service: Neurosurgery;  Laterality: N/A;   • COLONOSCOPY  11/08/2011    One fold in the ascending colon which showed ulcer otherwise normal exam   • COLONOSCOPY  11/12/2004    Normal exam repeat in five years   • CORONARY ANGIOPLASTY WITH STENT PLACEMENT      X 2; 2013 & 2014   • ENDOSCOPY  07/10/2014    Normal exam   • HIP ABDUCTION TENOTOMY BILATERAL Right 1/14/2021    Procedure: RIGHT HIP GLUTEUS MEDLUS / MINIMUS REPAIR, POSSIBLE ACHILLES ALLOGRAFT;  Surgeon: Nino Carlson MD;  Location:  [facility] OR;  Service: Orthopedics;  Laterality: Right;   • INCISION AND DRAINAGE HIP Right 2/9/2021    Procedure: HIP INCISION AND DRAINAGE;  Surgeon: Nino Carlson MD;  Location:  [facility] OR;  Service: Orthopedics;  Laterality:  Right;   • JOINT REPLACEMENT     • LUMBAR DISCECTOMY Right 3/23/2021    Procedure: LUMBAR DISCECTOMY MICRO, Lumbar 1/2 right;  Surgeon: Bandar Shea MD;  Location: [facility];  Service: Neurosurgery;  Laterality: Right;   • LUMBAR FUSION N/A 1/19/2018    Procedure: L3-4,L4-5 DECOMPRESSION, POSTERIOR SPINAL FUSION WITH INSTRUMENTATION;  Surgeon: Fortino Oropeza MD;  Location:  [facility] OR;  Service:    • LUMBAR LAMINECTOMY WITH FUSION Left 1/17/2018    Procedure: LEFT L3-4 L4-5 LATERAL LUMBAR INTERBODY FUSION;  Surgeon: Fortino Oropeza MD;  Location:  [facility] OR;  Service:    • MYRINGOTOMY W/ TUBES  09/04/2014    TUBES NO LONGER IN PLACE   • OTHER SURGICAL HISTORY      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 so hardware was removed and spacers were placed   • REPLACEMENT TOTAL KNEE Right             OT ASSESSMENT FLOWSHEET (last 12 hours)      OT Evaluation and Treatment     Row Name 03/26/21 0940 03/26/21 0815                OT Time and Intention    Subjective Information  complains of;fatigue;pain  -JJ  complains of;pain  -TS       Document Type  therapy note (daily note)  -J  therapy note (daily note)  -TS       Mode of Treatment  occupational therapy  -JJ  occupational therapy  -TS       Comment  c-collar, LSO, woundvac, GUILLERMO drain.   -JJ  --          Pain Assessment    Additional Documentation  Pain Scale: FACES Pre/Post-Treatment (Group)  -JJ  --          Pain Scale: FACES Pre/Post-Treatment    Pain: FACES Scale, Pretreatment  2-->hurts little bit  -JJ  --       Posttreatment Pain Rating  2-->hurts little bit  -JJ  --       Pain Location - Orientation  incisional  -JJ  --          Transfer Assessment/Treatment    Transfers  sit-stand transfer;stand-sit transfer;toilet transfer  -JJ  --          Transfers    Sit-Stand [place] (Transfers)  verbal cues;contact guard  -JJ  --       Stand-Sit [place] (Transfers)  verbal cues;contact guard  -JJ  --       [place] Level (Toilet Transfer)  verbal  cues;contact guard  -JJ  --       Assistive Device (Toilet Transfer)  commode;grab bars/safety frame;walker, front-wheeled  -JJ  --          Sit-Stand Transfer    Assistive Device (Sit-Stand Transfers)  walker, front-wheeled  -JJ  --          Stand-Sit Transfer    Assistive Device (Stand-Sit Transfers)  walker, front-wheeled  -JJ  --          Toilet Transfer    Type (Toilet Transfer)  sit-stand;stand-sit  -JJ  --          Activities of Daily Living    BADL Assessment/Intervention  toileting  -JJ  --          Upper Body Dressing Assessment/Training    [place] Level (Upper Body Dressing)  maximum assist (25% patient effort);don  -JJ  --       Position (Upper Body Dressing)  unsupported sitting  -JJ  --       Comment (Upper Body Dressing)  LSO  -JJ  --          Lower Body Dressing Assessment/Training    [place] Level (Lower Body Dressing)  don;doff;pants/bottoms;socks;maximum assist (25% patient effort)  -JJ  --       Position (Lower Body Dressing)  unsupported sitting  -JJ  --       Comment (Lower Body Dressing)  Pt with large incontient episode, requiring Increased assist for LBD.   -JJ  --          Toileting Assessment/Training    [place] Level (Toileting)  adjust/manage clothing;perform perineal hygiene;maximum assist (25% patient effort)  -JJ  --       Assistive Devices (Toileting)  commode;grab bar/safety frame  -JJ  --       Position (Toileting)  supported sitting;supported standing  -JJ  --       Comment (Toileting)  Pt with large incontient episode, requiring increased assistance.   -JJ  --          Wound 03/03/21 1446 Right anterior neck Incision    Wound - Properties Group Placement Date: 03/03/21  -HW Placement Time: 1446  -HW Present on Hospital Admission: N  -HW Side: Right  -TANIKA Orientation: anterior  -HW Location: neck  -HW Primary Wound Type: Incision  -HW    Retired Wound - Properties Group Date first assessed: 03/03/21  -HW Time first assessed: 1446  -HW Present on Hospital Admission: N   -HW Side: Right  -TANIKA Location: neck  -HW Primary Wound Type: Incision  -HW       Wound 02/09/21 1715 Right hip Incision    Wound - Properties Group Placement Date: 02/09/21  -SH Placement Time: 1715  -SH Present on Hospital Admission: Y  -SH Side: Right  -SH Location: hip  -SH Primary Wound Type: Incision  -SH    Retired Wound - Properties Group Date first assessed: 02/09/21  -SH Time first assessed: 1715  -SH Present on Hospital Admission: Y  -SH Side: Right  -SH Location: hip  -SH Primary Wound Type: Incision  -SH       Wound 03/23/21 1021 lumbar spine Incision    Wound - Properties Group Placement Date: 03/23/21  -KINGS Placement Time: 1021  -KINGS Location: lumbar spine  -KINGS Primary Wound Type: Incision  -KINGS    Retired Wound - Properties Group Date first assessed: 03/23/21  -KINGS Time first assessed: 1021  -KINGS Location: lumbar spine  -KINGS Primary Wound Type: Incision  -KINGS       NPWT (Negative Pressure Wound Therapy) 02/09/21 1741 right hip    NPWT (Negative Pressure Wound Therapy) - Properties Group Placement Date: 02/09/21  -SH Placement Time: 1741 -SH Location: right hip  -SH Additional Comments: 1 pc of black sponge  -SH    Retired NPWT (Negative Pressure Wound Therapy) - Properties Group Placement Date: 02/09/21  -SH Placement Time: 1741 -SH Location: right hip  -SH Additional Comments: 1 pc of black sponge  -SH       Plan of Care Review    Plan of Care Reviewed With  patient  -QUANG  --       Outcome Summary  OT tx completed. Upon entry to room, pt attempted to amb to bathroom without LSO or assistance. Pt educated on importance of use of LSO and importance of calling for assistance before attempting t/fs or mobility. Pt with large incontient epsiode, requiring Max A for all aspects of toileting, donning/doffing pants, socks and shoes. She was able to amb from chair <> bathroom with rwx and CGA. She continue OT POC.   -QUANG  --          Positioning and Restraints    Pre-Treatment Position  sitting in chair/recliner   -JJ  --       Post Treatment Position  chair  -JJ  --       In Chair  reclined;notified nsg;call light within reach;encouraged to call for assist;with brace  -JJ  --          Progress Summary (OT)    Progress Toward Functional Goals (OT)  progress toward functional goals is good  -JJ  --       Daily Progress Summary (OT)  continue OT POC  -JJ  --       Barriers to Overall Progress (OT)  fall/safety awareness.   -JJ  --          Therapy Plan Review/Discharge Plan (OT)    Anticipated Discharge Disposition (OT)  inpatient rehabilitation facility  -J  --         User Key  (r) = Recorded By, (t) = Taken By, (c) = Cosigned By    Initials Name Effective Dates    TS Carolee Giordano, CONTI/L 08/02/16 -     Ewelina Eubanks RN 08/02/16 -     Esequiel Roche RN 08/02/16 -     Trinity Haddad OTR/L 12/04/20 -     Laurie Ritter, SARAH 01/02/19 -     Benjie Melendez RN 06/05/20 -          Occupational Therapy Education                 Title: PT OT SLP Therapies (In Progress)     Topic: Occupational Therapy (In Progress)     Point: ADL training (Done)     Description:   Instruct learner(s) on proper safety adaptation and remediation techniques during self care or transfers.   Instruct in proper use of assistive devices.              Learning Progress Summary           Patient Acceptance, E, VU by QUANG at 3/23/2021 1453   Family Acceptance, E, VU by QUANG at 3/23/2021 1453                   Point: Home exercise program (Not Started)     Description:   Instruct learner(s) on appropriate technique for monitoring, assisting and/or progressing therapeutic exercises/activities.              Learner Progress:  Not documented in this visit.          Point: Precautions (Done)     Description:   Instruct learner(s) on prescribed precautions during self-care and functional transfers.              Learning Progress Summary           Patient Acceptance, E,TB, VU,NR by  at 3/25/2021 0850    Comment: LSO when up     Acceptance, E, VU by QUANG at 3/23/2021 1453   Family Acceptance, E, VU by QUANG at 3/23/2021 1453                   Point: Body mechanics (Done)     Description:   Instruct learner(s) on proper positioning and spine alignment during self-care, functional mobility activities and/or exercises.              Learning Progress Summary           Patient Acceptance, E, VU by QUANG at 3/23/2021 1453   Family Acceptance, E, VU by QUANG at 3/23/2021 1453                               User Key     Initials Effective Dates Name Provider Type Discipline     08/02/16 -  Olga Chambers PTA Physical Therapy Assistant PT     12/04/20 -  Trinity Day OTR/L Occupational Therapist OT                  OT Recommendation and Plan  Planned Therapy Interventions (OT): activity tolerance training, adaptive equipment training, BADL retraining, functional balance retraining, occupation/activity based interventions, patient/caregiver education/training, transfer/mobility retraining, orthotic fabrication/fitting/training  Therapy Frequency (OT): 5 times/wk  Progress Toward Functional Goals (OT): progress toward functional goals is good  Plan of Care Review  Plan of Care Reviewed With: patient  Outcome Summary: OT tx completed. Upon entry to room, pt attempted to amb to bathroom without LSO or assistance. Pt educated on importance of use of LSO and importance of calling for assistance before attempting t/fs or mobility. Pt with large incontient epsiode, requiring Max A for all aspects of toileting, donning/doffing pants, socks and shoes. She was able to amb from chair <> bathroom with rwx and CGA. She continue OT POC.   Plan of Care Reviewed With: patient  Outcome Summary: OT tx completed. Upon entry to room, pt attempted to amb to bathroom without LSO or assistance. Pt educated on importance of use of LSO and importance of calling for assistance before attempting t/fs or mobility. Pt with large incontient epsiode, requiring Max A for all  aspects of toileting, donning/doffing pants, socks and shoes. She was able to amb from chair <> bathroom with rwx and CGA. She continue OT POC.     Outcome Measures     Row Name 03/26/21 1000 03/24/21 1400          How much help from another is currently needed...    Putting on and taking off regular lower body clothing?  2  -JJ  2  -TS     Bathing (including washing, rinsing, and drying)  2  -JJ  2  -TS     Toileting (which includes using toilet bed pan or urinal)  3  -JJ  3  -TS     Putting on and taking off regular upper body clothing  2  -JJ  3  -TS     Taking care of personal grooming (such as brushing teeth)  3  -JJ  3  -TS     Eating meals  4  -JJ  4  -TS     AM-PAC 6 Clicks Score (OT)  16  -JJ  17  -TS        Functional Assessment    Outcome Measure Options  AM-PAC 6 Clicks Daily Activity (OT)  -  --       User Key  (r) = Recorded By, (t) = Taken By, (c) = Cosigned By    Initials Name Provider Type    TS Carolee Giordano COTA/L Occupational Therapy Assistant    Trinity Haddad OTR/L Occupational Therapist          Time Calculation:   Time Calculation- OT     Row Name 03/26/21 1000             Time Calculation- OT    OT Start Time  0940  -      OT Stop Time  1005  -      OT Time Calculation (min)  25 min  -      Total Timed Code Minutes- OT  25 minute(s)  -      OT Received On  03/26/21  -        User Key  (r) = Recorded By, (t) = Taken By, (c) = Cosigned By    Initials Name Provider Type    Trinity Haddad OTR/L Occupational Therapist        Therapy Charges for Today     Code Description Service Date Service Provider Modifiers Qty    [id number] HC OT SELF CARE/MGMT/TRAIN EA 15 MIN 3/26/2021 Trinity Day OTR/L GO 2               KATHYA Sr/KARUNA  3/26/2021

## 2021-03-26 NOTE — PROGRESS NOTES
4 Eyes Skin Assessment    Tariq Jessica is being assessed upon: Admission    I agree that I, Tiffanie Ash, along with Roque Lubin, RN (either 2 RN's or 1 LPN and 1 RN) have performed a thorough Head to Toe Skin Assessment on the patient. ALL assessment sites listed below have been assessed. Areas assessed by both nurses:     [x]   Head, Face, and Ears   [x]   Shoulders, Back, and Chest  [x]   Arms, Elbows, and Hands   [x]   Coccyx, Sacrum, and Ischium  [x]   Legs, Feet, and Heels    Does the Patient have Skin Breakdown?  No    Varinder Prevention initiated: No  Wound Care Orders initiated: No    WOC nurse consulted for Pressure Injury (Stage 3,4, Unstageable, DTI, NWPT, and Complex wounds) and New or Established Ostomies: Yes        Primary Nurse eSignature: iTffanie Ash RN on 3/26/2021 at 6:10 PM      Co-Signer eSignature: Electronically signed by Estefani Hall RN on 3/26/2021 at [address] PM

## 2021-03-26 NOTE — DISCHARGE INSTRUCTIONS
[facility] Neurosurgery    Postoperative care following spine surgery  Dear Patient,  You have recently undergone spine surgery (C6 corpectomy followed by an L1-2 microdiscectomy) and are now ready to go home. These written instructions are intended to help you to recover quickly.  • If you have ANY QUESTIONS about your condition prior to discharge please ask Dr. Shea. In particular, if you have concerns about going home discuss them now. We do not want you to go until you are completely comfortable leaving the hospital.   • If you have ANY QUESTIONS about your condition after you go home call your doctor. The number is [phone] which is answered 24 hours a day. During regular working hours a  will connect you to your doctor, one of his partners, or one of our nurses. At night or on weekends the answering service will connect you with the physician on call. DO NOT HESITATE to call. We want to help you with any problems.     Deep vein thrombosis/ pulmonary embolus  Some patients who undergo surgery develop blood clots in the veins of the legs. These clots can cause pain or swelling in the legs, or may cause no obvious problem. They can break free from the legs and travel to the lungs causing shortness of breath and/or chest pain.you develop pain or swelling in your legs after surgery, call your doctor. If you develop breathing problems or chest pain after surgery, call 911.    If you are being discharged to inpatient rehabilitation we will keep you on a drug to thin your blood called heparin. You have been receiving this while you were in the hospital.     Neurological Deficit  Neurological deficits are problems with brain function like speech difficulty, weakness, numbness, imbalance, etc. These deficits may be present before or after spine surgery. Prior to discharge your doctor will make sure that all treatment needed to help you recover from such deficits has been instituted. He will also  make sure that these deficits are stable or improving. After you go home, if you think any of your spine problems are getting worse, not better, it may be a sign of bleeding, infection, or other problems. Call your doctor. He will order tests and prescribe treatment as needed.    Activity Restrictions  In general after surgery you will be on restricted activities for several weeks to several months depending on the nature of your operation. For six weeks you should avoid heavy lifting (over 8 lbs or roughly a gallon of milk), bending, or stooping. You may be released by Dr. Shea earlier. You may return to driving when you feel comfortable enough that you can safely handle your vehicle AND you are not taking narcotic pain medications. This may be as early as 10 - 14 days, but could be longer as instructed by your neurosurgeon. After surgery you may gradually increase your activities, as you are able to tolerate. Walking is an excellent low impact exercise to begin after surgery. You should try to make regular walks of 15 to 30 minutes part of your postoperative recovery as you become able to do so. It typically requires a period of days to a few weeks to reach this level of activity and should be done in a gradual fashion. Your return to work will depend on your job requirements. In general, you may return to light duty work as soon as you feel comfortable and are not taking routine narcotic pain medications.    Medication  It is important to take your medication EXACTLY as prescribed. Some patients are reluctant to take pain medication. It is perfectly fine to take pain medication for several weeks after surgery. We want to eliminate pain whenever possible. Many pain medications can cause nausea (sick to your stomach), constipation (inability to poop), or itching. Nausea may be minimized by taking the medication with food. Constipation can be relieved by taking stool softeners and/ or laxatives that you can  purchase over the counter as needed.    It is important to realize that no pain after surgery is an unrealistic expectation.  Pain medication will never reduce your pain score to zero.  The goal of pain medicine is to reduce your pain to the point you can move, take care of yourself, and participate in therapy.  Make sure to work with your caregiver to determine what is an adequate level of pain control to promote healthy movement and then take your medication to reach this goal.      Please taper your pain medications to avoid long term addiction.  Example...  Week 1: Take your pain medication no more than ever 4 hours as needed for severe pain.  Week 2: Take your pain medication no more than ever 6 hours as needed for severe pain.  Week 3: Take your pain medication no more than ever 8 hours as needed for severe pain.  Week 4: Take your pain medication no more than ever 12 hours as needed for severe pain.  Week 5: Take your pain medication no more than ever 24 hours as needed for severe pain.  By Week 6 you should be off of all pain medication.     If you are near completion of your pain medications and feel that you require additional medications for pain, please notify the neurosurgical team 2-3 days before running out of pain medications.    Wound Care  Your incision is held together with dissolvable sutures that do not need to be removed.     Seventy-two hours after surgery it is OK to get the wound wet, so you can take a shower or bath.     You do not need to put any medication (like Neosporin or Vitamin E) on the wound. Scrubbing the wound should be avoided until the staples nondissolvable sutures come out.     Heating pads have the potential to cause very serious burns, especially in patients using narcotic pain medications (e.g. Oxycodone, Oxycontin, etc.) Do not use heating pads during your recovery.      No nicotine products, including second-hand smoke, gum or patches. Nicotine will delay healing and  increase the likelihood of a surgical complication. For help quitting, call the Quitline: 1-306.442.8594     Potential wound problems include the following:  • Infection--If the wound becomes red, tender, swollen, or warm it may be infected. Infection is often accompanied by fever. If you think your wound might be infected you should call your doctor. Often you can send us a picture of the wound so we can better evaluate it.   • Drainage--Fluid should not drain from your wound. If it does, call your doctor. Colored fluid may indicate infection. Clear fluid may indicate leakage of spinal fluid.   • Dehiscence--If the wound does not heal properly it may open up along the staple line. This is called dehiscence. Call us immediately.   • Sutures--Occasionally, one of the buried threads (sutures) may work through the skin. If you think this has happened call your doctor.   • Swelling--Spinal fluid or blood may collect under the skin. This is usually harmless, but needs to be evaluated. Call your doctor.     How to contact your doctor  Dr. Shea and his team did your surgery and, therefore, are likely to know more about your condition than any other physicians. We are immediately available to help you with any problems after surgery. Please call us for any concerns at the following numbers:  • Doctor’s office:  [phone] (answered 24 hours a day)   • [facility] : [phone] (alternative emergency number for on-call neurosurgeon)     Specific instructions related to your surgery  Diet: no restrictions, eat a heart healthy diet. If you are diabetic, tightly controlling your blood sugar over the next 2 weeks is crucial in controlling infection.     Activity: as tolerated, no heavy lifting or strenuous exercise for at least 2 weeks.    Brace/collar instructions: Cervical collar should be worn at all times except while bathing.  Please shower daily allowing clean soapy water to cleanse wound.  If  you have had a posterior cervical fusion, place a clean dry gauze over your incision before putting cervical collar on.    Please do not use NSAIDs (Ibuprofen, Toradol, etc) for 4 weeks following spinal fusion, as this can prevent bone growth. Tylenol is acceptable as an over the counter pain management.     Follow up:   Follow up with with Taiwo SUAREZ in 2 weeks for post op wound check and with Dr. Shea in the neurosurgery clinic ([phone]) in 6 weeks.  X-rays of the cervical spine prior to arrival. We will schedule this appointment for you.            Sincerely,        Carrillo Shea MD, PhD, MPH

## 2021-03-26 NOTE — PROGRESS NOTES
[facility] Medicine Services  INPATIENT PROGRESS NOTE    Patient Name: Tawny Shin  Date of Admission: 3/22/2021  Today's Date: 03/25/21  Length of Stay: 3  Primary Care Physician: Davon Urrutia MD    Subjective   Chief Complaint: back pain  HPI     Patient seen and examined at bedside.    Patient sitting up in chair.  Patient drinking Steak and Shake milk shake.  Tolerating diet.        Review of Systems   Constitutional: Negative for activity change, chills, fatigue and fever.   Respiratory: Negative for cough and shortness of breath.    Cardiovascular: Negative for chest pain and palpitations.   Gastrointestinal: Negative for abdominal distention, abdominal pain, constipation, diarrhea, nausea and vomiting.        All pertinent negatives and positives are as above. All other systems have been reviewed and are negative unless otherwise stated.     Objective    Temp:  [97.7 °F (36.5 °C)-98.1 °F (36.7 °C)] 98.1 °F (36.7 °C)  Heart Rate:  [79-88] 79  Resp:  [16-18] 18  BP: (141-174)/(59-73) 144/73  Physical Exam  Vitals and nursing note reviewed.   Constitutional:       Appearance: She is obese.      Comments: Sitting up in the chair   HENT:      Head: Normocephalic and atraumatic.      Nose: No congestion or rhinorrhea.      Mouth/Throat:      Mouth: Mucous membranes are dry.      Pharynx: Oropharynx is clear.   Eyes:      General: No scleral icterus.     Conjunctiva/sclera: Conjunctivae normal.   Neck:      Comments: C collar   Cardiovascular:      Rate and Rhythm: Normal rate and regular rhythm.   Pulmonary:      Effort: Pulmonary effort is normal. No respiratory distress.      Breath sounds: Normal breath sounds. No stridor. No wheezing or rhonchi.   Abdominal:      General: Abdomen is flat. Bowel sounds are normal. There is no distension.      Palpations: Abdomen is soft. There is no mass.   Skin:     General: Skin is warm and dry.      Coloration: Skin is not  jaundiced or pale.   Neurological:      General: No focal deficit present.      Mental Status: She is alert and oriented to person, place, and time.   Psychiatric:         Mood and Affect: Mood normal.         Behavior: Behavior normal.             Results Review:  I have reviewed the labs, radiology results, and diagnostic studies.    Laboratory Data:   Results from last 7 days   Lab Units 03/25/21 0450 03/24/21 0422 03/23/21  0420   WBC 10*3/mm3 5.29 4.71 5.41   HEMOGLOBIN g/dL 9.7* 8.7* 9.2*   HEMATOCRIT % 31.8* 28.3* 29.4*   PLATELETS 10*3/mm3 264 218 210        Results from last 7 days   Lab Units 03/25/21 0450 03/24/21 0422 03/23/21  0420   SODIUM mmol/L 139 138 137   POTASSIUM mmol/L 3.7 4.5 4.0   CHLORIDE mmol/L 102 104 100   CO2 mmol/L 30.0* 26.0 27.0   BUN mg/dL 7* 8 7*   CREATININE mg/dL 0.40* 0.32* 0.35*   CALCIUM mg/dL 8.7 8.4* 7.9*   BILIRUBIN mg/dL 0.3 0.3 0.4   ALK PHOS U/L 86 78 88   ALT (SGPT) U/L <5 <5 <5   AST (SGOT) U/L 13 19 17   GLUCOSE mg/dL 86 79 78       Culture Data:   Wound Culture   Date Value Ref Range Status   03/23/2021 No growth  Preliminary       Radiology Data:   Imaging Results (Last 24 Hours)     ** No results found for the last 24 hours. **          I have reviewed the patient's current medications.     Assessment/Plan     Active Hospital Problems    Diagnosis    • Discitis, unspecified, lumbar region    • Iliopsoas abscess (CMS/HCC)    • Stenosis of cervical spine with myelopathy (CMS/HCC)      Added automatically from request for surgery [id number]     • Spinal stenosis, lumbar region, with neurogenic claudication      Added automatically from request for surgery [id number]     • Osteomyelitis of lumbar spine (CMS/HCC)      Added automatically from request for surgery [id number]     • Discitis of lumbar region      Added automatically from request for surgery [id number]     • Staphylococcus aureus bacteremia    • Obesity (BMI 30-39.9)    • Cardiac pacemaker syndrome      Overview:   -  "heart block  - implanted 11/16     • Seropositive rheumatoid arthritis of multiple sites (CMS/HCC)      Overview:   -myochrysine '93-'96  -methotrexate '96--->11/98;r/s  restarted 2/99--> 8/14 (anemia)  -sulfasalazine- not effective  -penicillamine 6/98-->10/98; no effect  -leflunomide 11/98-->  - Humira '13-->didn't take  - Enbrel 12/14-->3/15- no effect!      Last Assessment & Plan:   - \"aching all over\" because she had to be off her anti-rheumatic drugs for 2 weeks in preparation for her R knee surgery  - her R knee surgery got cancelled because she developed cellulitis of her L arm (with fever). She was started on Bactrim, and then transitioned to doxycyline on Aug 1 2018 (3 week course) and her R knee surgery was postponed to 1 month from now.   - As such, she restarted her anti-rheumatics on Aug 1 2018.  - She will have to hold her anti-rheumatics again 2 weeks before her surgery  - She feels her medications are otherwise working well. No changes to be made to her current regimen, which is Humira injection q2 week, leflunomide (Arava), and salicylate     - Pt is concerned that when she transitions insurance in December of this year, she will lose her salicylate. We had a discussion and expressed that it would be best for her to stay on salicylate because we do not want the blood thinning effects of aspirin. Dr. Walden will plan to write a prior authorization for her for salicylate.     • Coronary artery disease      PCI RCA '10, NSTEMI requiring LAD PCI in '13    [facility]19 at [facility]: no significant dz; stents patent     • S/P coronary artery stent placement        Plan:  1.  Continue antibiotics per ID  2.  Tolerated procedure.  GUILLERMO in place.  Wound culture shows MSSA.  Concern for abscess noted during procedure.     3.  ECG reviewed.  Normal sinus rhythm; normal rate; left axis deviation; no significant ST changes  4.  PT/OT following surgery for assistance with disposition  5.  Resume home medications as " appropriate  6.  Incentive spirometer  7.  Pain control per neurosurgery   8.  Bowel regimen  9.  Heparin for VTE PPx  10.  Hold RA medications.     Will sign off.  Please reach off it further assistance is needed.  Patient likely to be discharged tomorrow.            Discharge Planning: I expect the patient to be discharged per attending.    Electronically signed by Moises Oliveira MD, 03/25/21, 21:53 CDT.

## 2021-03-26 NOTE — DISCHARGE SUMMARY
DISCHARGE SUMMARY FROM HOSPITAL    Date of Discharge:  3/26/2021    Presenting Diagnosis/History of Present Illness  Discitis, unspecified, lumbar region [M46.46]    Medical History   Patient Active Problem List   Diagnosis   • Eustachian tube dysfunction   • Sensorineural hearing loss   • Lumbago of multiple sites in spine with sciatica   • Spondylolisthesis of lumbar region   • Coronary artery disease   • Hyperlipidemia   • Hypertension   • Myalgia due to statin   • S/P coronary artery stent placement   • Pacemaker   • Seropositive rheumatoid arthritis of multiple sites (CMS/[facility])   • Sick sinus syndrome (CMS/[facility])   • Other osteoporosis without current pathological fracture   • Allergic-infective asthma   • Arthritis of both knees   • Vasovagal syncope   • Bilateral bunions   • Bronchitis   • Cardiac pacemaker syndrome   • Charcot's joint of foot, left   • Constipation   • 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   • Intrinsic asthma   • Left carotid bruit   • Neutropenia (CMS/[facility])   • Obesity   • Primary osteoarthritis of left knee   • Psoriasis vulgaris   • Recurrent acute serous otitis media of both ears   • S/P lumbar laminectomy   • Thrombocytopenia (CMS/[facility])   • Hypothyroidism   • Vitamin D deficiency   • Myxedema heart disease   • Spondylolisthesis of lumbar region   • Syncope, cardiogenic   • Rupture of gluteus minimus tendon   • Muscle tear   • Syncope, recurrent   • Leukocytosis   • Headache   • Elevated CPK   • Staphylococcus aureus bacteremia   • Right hip pain, suspected infection   • Obesity (BMI 30-39.9)   • Stenosis of cervical spine with myelopathy (CMS/[facility])   • Spinal stenosis, lumbar region, with neurogenic claudication   • Discitis of lumbar region   • Osteomyelitis of lumbar spine (CMS/[facility])   • Discitis, unspecified, lumbar region   • Iliopsoas abscess (CMS/[facility])     Discharge Diagnosis/ Active Hospital Problems:   Active Hospital Problems    Diagnosis    • Discitis, unspecified, lumbar region    •  "Iliopsoas abscess (CMS/[facility])    • Stenosis of cervical spine with myelopathy (CMS/[facility])      Added automatically from request for surgery [id number]     • Spinal stenosis, lumbar region, with neurogenic claudication      Added automatically from request for surgery [id number]     • Osteomyelitis of lumbar spine (CMS/[facility])      Added automatically from request for surgery [id number]     • Discitis of lumbar region      Added automatically from request for surgery [id number]     • Staphylococcus aureus bacteremia    • Obesity (BMI 30-39.9)    • Cardiac pacemaker syndrome      Overview:   - heart block  - implanted 11/16     • Seropositive rheumatoid arthritis of multiple sites (CMS/[facility])      Overview:   -myochrysine '93-'96  -methotrexate '96--->11/98;r/s  restarted 2/99--> 8/14 (anemia)  -sulfasalazine- not effective  -penicillamine 6/98-->10/98; no effect  -leflunomide 11/98-->  - Humira '13-->didn't take  - Enbrel 12/14-->3/15- no effect!      Last Assessment & Plan:   - \"aching all over\" because she had to be off her anti-rheumatic drugs for 2 weeks in preparation for her R knee surgery  - her R knee surgery got cancelled because she developed cellulitis of her L arm (with fever). She was started on Bactrim, and then transitioned to doxycyline on Aug 1 2018 (3 week course) and her R knee surgery was postponed to 1 month from now.   - As such, she restarted her anti-rheumatics on Aug 1 2018.  - She will have to hold her anti-rheumatics again 2 weeks before her surgery  - She feels her medications are otherwise working well. No changes to be made to her current regimen, which is Humira injection q2 week, leflunomide (Arava), and salicylate     - Pt is concerned that when she transitions insurance in December of this year, she will lose her salicylate. We had a discussion and expressed that it would be best for her to stay on salicylate because we do not want the blood thinning effects of aspirin. Dr. Walden will plan to write a " prior authorization for her for salicylate.     • Coronary artery disease      PCI RCA '10, NSTEMI requiring LAD PCI in '13    [facility] 9/3/19 at [facility]: no significant dz; stents patent     • S/P coronary artery stent placement      Hospital Course  Patient is a 65 y.o. female presented with a significant medical history of rheumatoid arthritis on disease modifying agents, prior lumbar fusion by Dr. Oropeza in 2018.  She presents with a new problem of postop surgical infection of her right hip with wound VAC dressing, progressive right lower extremity proximal weakness without numbness or radiculopathy. Physical exam findings of right iliopsoas weakness, and global hyperreflexia with Babinski and right Patience's and decreased  strength bilaterally.  Their imaging shows CT of the cervical spine with severe cervical stenosis at severe stenosis C5-6 and C6/7 and adjacent segment disease at L1/2 and L2/3 with concern for osteomyelitis discitis.      On 3/3/2021 the patient was brought to the main operating room where she underwent an uneventful C6 corpectomy per Dr. Shea.  Postoperative she did extremely well and her neurological exam continues to improve.  She remains in a cervical collar which will be continued upon discharge.  She returned to the main operating room on 3/23/2021 for an uneventful L1 microdiscectomy.  Again, postoperatively she did extremely.  While in the hospital, she has been routinely evaluated by infectious disease, Dr. Elie Copeland with discharge recommendations as follows:    Copied/obtained from Dr. Copeland's note from 3/25/2021  Feel she would benefit from acute rehab  She has been at bedrest for quite a while with fairly minimal ambulation  Hopefully physical therapy will help improve her strength and hopefully will help improve her overall pain control as she moves more and gets more active  Was planning 8 to 12-week course of intravenous antibiotic therapy for MSSA bloodstream infection,  right psoas abscess, lumbar discitis/vertebral osteomyelitis.  Would like to continue IV antibiotic treatment while she is receiving acute and subacute rehab     She is day 42 of antibiotic treatment today  See antibiotic recommendations below     Antibiotic recommendations:  1.  Diagnosis-MSSA bacteremia, right psoas abscess, L1/L2 discitis/vertebral osteomyelitis, postop wound infection right gluteal muscle tear repair, rheumatoid arthritis  2.  IV access-PICC line  3.  Spine surgeon-Carrillo Shea MD  Orthopedic surgeon-Nino Carlson MD  4.  Antibiotic recommendation:  Cefazolin 2 g IV every 8 hours  Last dose of cefazolin April 8, 2021 (8 weeks following clearing of blood cultures) or May 5, 2021 (12 weeks following clearing of blood cultures)  5.  Laboratory monitoring:  Plan CMP, CBC, and CRP weekly    Additionally, while in the hospital, her wound VAC to the right hip has been managed by physical therapy.  Her wound dressing is scheduled to be changed today, 3/26/2021 and as needed thereafter.  From a physical standpoint, she is continue to work with both physical therapy and Occupational Therapy who have deemed Ms. Shin safe to be discharged to [facility] to continue inpatient therapy.  She has been able to tolerate oral diet, oral pain medications, avoid, and ambulate with assist.  She will be provided an appropriate course of oral pain medications for pain control.  Additional instructions provided.    Procedures Performed  Procedure(s):  LUMBAR DISCECTOMY MICRO, Lumbar 1/2 right     Consults:   Consults     Date and Time Order Name Status Description    3/22/2021  2:57 PM Inpatient Hospitalist Consult Completed     2/23/2021 11:53 AM Inpatient Neurosurgery Consult Completed     2/12/2021  4:31 PM Inpatient Infectious Diseases Consult Completed         Pertinent Test Results:   MRI Cervical Spine With & Without Contrast    Result Date: 2/24/2021  1. Large central and rightward disc causing severe central  canal stenosis and occluding the right neural foramen at the C5-6 level. 2. Central and leftward disc flattening the cord and occluding the left neural foramen at the C6-7 level.   This report was finalized on 02/24/2021 15:37 by Dr. Chai Francisco MD.    CT Abdomen Pelvis With Contrast    Addendum Date: 2/26/2021    ADDENDUM  IMPRESSION should also include: 5. Thickening of the distal esophagus, which can be seen with reflux. Correlate with symptoms and consider direct visualization when clinically appropriate.  END ADDENDUM This report was finalized on 02/26/2021 11:43 by Dr Paulina Tavares MD.    Result Date: 2/26/2021  1. Linear gas tracking along the right lateral hip soft tissues. No discrete drainable fluid collection. Gas is closely adjacent to the right greater trochanter, but not definitely touching it. No convincing erosive bony changes at this time. No large hip joint effusion. 2. Density in the dependent aspect of the urinary bladder, could represent early excretion of contrast, blood products or mass. Consider follow-up ultrasound in 2-4 weeks to evaluate for resolution. 3. Gallstone. 4. Coronary artery calcifications. This report was finalized on 02/26/2021 09:14 by Dr Paulina Tavares MD.    XR Chest 1 View    Result Date: 3/22/2021  1. No acute disease.    This report was finalized on 03/22/2021 16:10 by Dr. Jesus Manuel Santos MD.    FL C Arm During Surgery    Result Date: 3/3/2021   Intraoperative fluoroscopic images during anterior fusion and C6 cervical corpectomy.  Please refer to the operative note for more details. This report was finalized on 03/03/2021 18:45 by Dr. Chai Francisco MD.    CT Guided Abscess Drain Retroperitoneal    Result Date: 3/22/2021  Successful CT guided drainage of the right psoas intramuscular abscess. Approximately 10 cc rolf pus was obtained from the collection and set aside for microbiology evaluation. This report was finalized on 03/22/2021 10:07 by Dr Riki David  .    CT Guided Biopsy Aspiration Injection    Result Date: 3/2/2021  1.. The right psoas abscess has shown diminishment in size from the previous CT exam. The upper margin of the collection is no longer well demonstrated and there is some mild overall diminishment in size of the right psoas muscle in comparison with the left. Given the clinical situation in which the patient is to undergo surgery it was elected to attempt aspiration with removal of as much of the material as possible. I was subsequently able to remove proximally 20 cc of frankly purulent material from the right psoas muscle collection. This was sent to the laboratory for further evaluation as requested. There were no immediate complications. This report was finalized on 03/02/2021 14:46 by Dr. Neymar Calderon MD.    XR Abdomen KUB    Result Date: 3/19/2021  1. Small volume of gas and stool within the colon. No evidence of bowel structure. 2. Cholelithiasis. 3. Punctate nonobstructing intrarenal stones. Vascular calcifications. 4. Changes at L1/L2 consistent with known discitis. Osteomyelitis. Postoperative change of L3-L5. This report was finalized on 03/19/2021 20:17 by Dr. Trinity Gómez MD.    XR Spine Cervical 2 View    Result Date: 3/23/2021  Status post C6 corpectomy with anterior fusion at C5-C7. Interval removal of surgical drain. Improved prevertebral edema. This report was finalized on 03/23/2021 07:53 by Dr. Dillon Flores MD.    XR Spine Cervical 2 View    Result Date: 3/4/2021  1. Status post C6 corpectomy. The cervical vertebra are normally aligned. 2. Calcification left carotid artery.  This report was finalized on 03/04/2021 17:48 by Dr. Chai Francisco MD.    XR Spine Cervical 2 View    Result Date: 3/3/2021   Intraoperative fluoroscopic images during anterior fusion and C6 cervical corpectomy.  Please refer to the operative note for more details. This report was finalized on 03/03/2021 18:45 by Dr. Chai Francisco MD.    MRI Lumbar  Spine With & Without Contrast    Result Date: 3/18/2021  1. Continued discitis at L1-2 with rim enhancement of the disc. There is osteomyelitis in the adjacent vertebral body endplates. There is a moderate to large size central disc herniation prolapsing superiorly. There is facet arthropathy and thickening of ligamentum flavum. There is at least moderate narrowing of the central canal. There is foraminal stenosis right greater than left. There is probably some enhancing granulation tissue around the disc herniation. 2. Right psoas fluid collection/abscess is slightly smaller in size. 3. Postoperative and degenerative changes are noted at other levels. Please see the above report.  This report was finalized on 03/18/2021 11:59 by Dr. Zurdo Vazquez MD.    MRI Lumbar Spine With & Without Contrast    Result Date: 2/28/2021  1. Similar right psaos abscess and compared to 2/22/2021. 2. Discitis/possible osteomyelitis at the L1/L2 level. No epidural abscess identified. 3. Severe canal stenosis at the L1/L2 level primarily due to a large central disc protrusion. Please refer to dictation above for detailed findings at each level.  This report was finalized on 02/28/2021 13:47 by Dr. Trinity Gómez MD.    XR Hip With or Without Pelvis 2 - 3 View Right    Result Date: 3/12/2021   1.  No acute osseous abnormality.  2.  Degenerative changes in the SI joints.  3.  Some enthesopathic change at the gluteal tendon insertion sites on the RIGHT.  4.  Suggestion of previous hardware removal.   This report was finalized on 03/12/2021 16:36 by Dr. Gee Farley MD.    CBC:   Results from last 7 days   Lab Units 03/25/21  0450 03/24/21  0422 03/23/21  0420 03/22/21  1516   WBC 10*3/mm3 5.29 4.71 5.41 6.02   HEMOGLOBIN g/dL 9.7* 8.7* 9.2* 10.3*   HEMATOCRIT % 31.8* 28.3* 29.4* 32.9*   PLATELETS 10*3/mm3 264 218 210 230     BMP:  Results from last 7 days   Lab Units 03/25/21  0450 03/24/21  0422 03/23/21  0420 03/22/21  1516   SODIUM  mmol/L 139 138 137 138   POTASSIUM mmol/L 3.7 4.5 4.0 4.1   CHLORIDE mmol/L 102 104 100 98   CO2 mmol/L 30.0* 26.0 27.0 30.0*   BUN mg/dL 7* 8 7* 7*   CREATININE mg/dL 0.40* 0.32* 0.35* 0.38*   GLUCOSE mg/dL 86 79 78 113*   CALCIUM mg/dL 8.7 8.4* 7.9* 8.8   ALT (SGPT) U/L <5 <5 <5 <5     Culture Results:   Wound Culture   Date Value Ref Range Status   03/23/2021 No growth at 3 days  Final     Condition on Discharge:  Stable    Vital Signs  Temp:  [97.8 °F (36.6 °C)-98.1 °F (36.7 °C)] 98 °F (36.7 °C)  Heart Rate:  [79-85] 82  Resp:  [16-18] 18  BP: (141-186)/(59-78) 186/76    Physical Exam:   Physical Exam  Vitals and nursing note reviewed.   Constitutional:       General: She is not in acute distress.     Appearance: Normal appearance. She is well-developed and well-groomed. She is obese. She is not ill-appearing, toxic-appearing or diaphoretic.          Comments: BMI 33.52   HENT:      Head: Normocephalic and atraumatic.      Right Ear: Hearing normal.      Left Ear: Hearing normal.   Eyes:      Extraocular Movements: EOM normal.      Conjunctiva/sclera: Conjunctivae normal.      Pupils: Pupils are equal, round, and reactive to light.   Neck:      Trachea: Trachea normal.     Cardiovascular:      Rate and Rhythm: Normal rate and regular rhythm.   Pulmonary:      Effort: Pulmonary effort is normal. No tachypnea, bradypnea, accessory muscle usage or respiratory distress.   Abdominal:      Palpations: Abdomen is soft.   Musculoskeletal:      Cervical back: No edema, erythema or rigidity.        Legs:    Skin:     General: Skin is warm and dry.   Neurological:      Mental Status: She is alert and oriented to person, place, and time.      GCS: GCS eye subscore is 4. GCS verbal subscore is 5. GCS motor subscore is 6.   Psychiatric:         Speech: Speech normal.         Behavior: Behavior normal. Behavior is cooperative.          Neurologic Exam     Mental Status   Oriented to person, place, and time.   Attention:  normal. Concentration: normal.   Speech: speech is normal   Level of consciousness: alert    Bright and awake.  Oriented x3.  Follows commands without prompting showing thumbs up into fingers bilaterally.     Cranial Nerves     CN II   Visual fields full to confrontation.     CN III, IV, VI   Pupils are equal, round, and reactive to light.  Extraocular motions are normal.     CN V   Facial sensation intact.     CN VII   Facial expression full, symmetric.     CN VIII   CN VIII normal.     CN IX, X   CN IX normal.     CN XI   CN XI normal.     Motor Exam     Strength   Right deltoid: 5/5  Left deltoid: 5/5  Right biceps: 5/5  Left biceps: 5/5  Right triceps: 5/5  Left triceps: 5/5  Right wrist extension: 5/5  Left wrist extension: 5/5  Right interossei: 4/5  Left interossei: 4/5  Right iliopsoas: 2/5  Left iliopsoas: 5/5  Right quadriceps: 2/5  Left quadriceps: 5/5  Right anterior tibial: 3/5  Left anterior tibial: 5/5  Right gastroc: 4/5  Left gastroc: 5/5       Sensory Exam   Sensory deficit distribution on right: L1    Gait, Coordination, and Reflexes     Tremor   Resting tremor: absent  Intention tremor: absent  Action tremor: absent    Reflexes   Right plantar: upgoing  Left plantar: normal  Right Mims: present  Left Mims: absent  Right ankle clonus: present  Left ankle clonus: present  Right pendular knee jerk: absent  Left pendular knee jerk: absent    Discharge Disposition  Rehab Facility or Unit (DC - External)    Discharge Medications     Discharge Medications      Changes to Medications      Instructions Start Date   ceFAZolin in 0.9% normal saline 2 GM/ 100 mL solution IVPB  Commonly known as: ANCEF  What changed: when to take this   2 g, Intravenous, Every 8 Hours      guaiFENesin 600 MG 12 hr tablet  Commonly known as: MUCINEX  What changed:   · how much to take  · when to take this   600 mg, Oral, Every 12 Hours Scheduled      nitroglycerin 0.4 MG SL tablet  Commonly known as: Nitrostat  What  changed: additional instructions   0.4 mg, Sublingual, Every 5 Minutes PRN, Take no more than 3 doses in 15 minutes.      oxyCODONE-acetaminophen  MG per tablet  Commonly known as: PERCOCET  What changed:   · how much to take  · when to take this   1 tablet, Oral, Every 4 Hours PRN      pantoprazole 40 MG EC tablet  Commonly known as: PROTONIX  What changed: Another medication with the same name was removed. Continue taking this medication, and follow the directions you see here.   40 mg, Oral, Daily      predniSONE 1 MG tablet  Commonly known as: DELTASONE  What changed: Another medication with the same name was removed. Continue taking this medication, and follow the directions you see here.   2 mg, Oral, Daily   Start Date: March 27, 2021        Continue These Medications      Instructions Start Date   bisacodyl 10 MG suppository  Commonly known as: DULCOLAX   10 mg, Rectal, Daily PRN      budesonide 0.5 MG/2ML nebulizer solution  Commonly known as: PULMICORT   0.5 mg, Nebulization, 2 Times Daily PRN      carvedilol 25 MG tablet  Commonly known as: COREG   25 mg, Oral, 2 Times Daily With Meals      cloNIDine 0.1 MG tablet  Commonly known as: CATAPRES   0.1 mg, Oral, 2 Times Daily PRN      cyclobenzaprine 10 MG tablet  Commonly known as: FLEXERIL   10 mg, Oral, 3 Times Daily PRN      docusate sodium 100 MG capsule   100 mg, Oral, 2 Times Daily      furosemide 40 MG tablet  Commonly known as: LASIX   40 mg, Oral, Daily      gabapentin 100 MG capsule  Commonly known as: NEURONTIN   100 mg, Oral, Every 12 Hours Scheduled      heparin (porcine) 5000 UNIT/ML injection   5,000 Units, Subcutaneous, Every 12 Hours Scheduled      isosorbide mononitrate 60 MG 24 hr tablet  Commonly known as: IMDUR   60 mg, Oral, 2 Times Daily      labetalol 5 MG/ML injection  Commonly known as: NORMODYNE,TRANDATE   20 mg, Intravenous, Every 10 Minutes PRN      levothyroxine 75 MCG tablet  Commonly known as: SYNTHROID, LEVOTHROID   75  mcg, Oral, Daily      lidocaine 5 %  Commonly known as: LIDODERM   1 patch, Transdermal, Daily PRN, Remove & Discard patch within 12 hours or as directed by MD      magnesium hydroxide 2400 MG/10ML suspension suspension  Commonly known as: MILK OF MAGNESIA   10 mL, Oral, Daily PRN      methylnaltrexone 12 MG/0.6ML solution  Commonly known as: RELISTOR   6 mg, Subcutaneous, Every Other Day      multivitamin with minerals tablet tablet   1 tablet, Oral, Daily      naloxone 0.4 MG/ML injection  Commonly known as: NARCAN   0.1 mg, Intravenous, Every 5 Minutes PRN      ondansetron 4 MG tablet  Commonly known as: ZOFRAN   4 mg, Oral, Every 6 Hours PRN      polyethylene glycol 17 g packet  Commonly known as: MIRALAX   17 g, Oral, Daily      sennosides-docusate 8.6-50 MG per tablet  Commonly known as: PERICOLACE   2 tablets, Oral, 2 Times Daily      valACYclovir 500 MG tablet  Commonly known as: VALTREX   500 mg, Oral, Every 24 Hours Scheduled         Stop These Medications    acetaminophen 325 MG tablet  Commonly known as: TYLENOL     ascorbic acid 500 MG tablet  Commonly known as: VITAMIN C     fentaNYL 25 MCG/HR patch  Commonly known as: DURAGESIC     folic acid 1 MG tablet  Commonly known as: FOLVITE     HYDROmorphone 1 MG/ML solution injection  Commonly known as: DILAUDID     Menthol (Topical Analgesic) 4 % gel     niCARdipine  Commonly known as: CARDENE     saccharomyces boulardii 250 MG capsule  Commonly known as: FLORASTOR     sodium chloride 0.9 % with KCl 20 mEq 20-0.9 MEQ/L-% infusion     zinc sulfate 220 (50 Zn) MG capsule  Commonly known as: ZINCATE          Discharge Diet:   Diet Instructions     Advance Diet As Tolerated           Activity at Discharge:   Activity Instructions     Activity as Tolerated      Bathing Restrictions      May shower 72 hours postoperatively.  No tub baths.  Showers only.  Allow clean soapy water to cleanse wound.  Do not scrub incision.    Type of Restriction: Bathing    Bathing  Restrictions: No Tub Bath    Driving Restrictions      Type of Restriction: Driving    Driving Restrictions: No Driving While Taking Narcotics    Gradually Increase Activity Until at Pre-Hospitalization Level      Lifting Restrictions      Type of Restriction: Lifting    Lifting Restrictions: Lifting Restriction (Indicate Limit)    Weight Limit (Pounds): 8    Length of Lifting Restriction: 2-3 WEEKS         Follow-up Appointments  Future Appointments   Date Time Provider Department Center   4/28/2021  8:45 AM Bandar Shea MD [facility] PAD PAD   6/28/2021 10:00 AM Davon Urrutia MD [facility] PC VSQ PAD   7/7/2021  9:15 AM [facility]   1/13/2022 11:15 AM [facility]     Additional Instructions for the Follow-ups that You Need to Schedule     Call MD With Problems / Concerns   As directed      Instructions: Call for worsening pain or a concern for a postoperative incision infection (increased incisional redness, swelling, warmth, or drainage/discharge).    Order Comments: Instructions: Call for worsening pain or a concern for a postoperative incision infection (increased incisional redness, swelling, warmth, or drainage/discharge).          Discharge Follow-up with Specified Provider: Dr. Shea; 6 Weeks   As directed      To: Dr. Shea    Follow Up: 6 Weeks    Follow Up Details: 6 WEEKS         Discharge Follow-up with Specified Provider: DANIELA Murphy; 2 Weeks   As directed      To: DANIELA Murphy    Follow Up: 2 Weeks    Follow Up Details: Postop wound check         XR Spine Cervical 2 or 3 View   May 03, 2021      Exam reason: Postop C6 corpectomy             Test Results Pending at Discharge  Pending Labs     Order Current Status    Vancomycin, Trough In process        DANIELA Velazquez  03/26/21  09:56 CDT    [id number]

## 2021-03-26 NOTE — NURSING NOTE
Report to SARAH Tran at [facility].  No questions or concerns.  Will update upon pt leaving.

## 2021-03-26 NOTE — THERAPY TREATMENT NOTE
Acute Care - Occupational Therapy Treatment Note  [facility]     Patient Name: Tawny Shin  : 1955  MRN: [id number]  Today's Date: 3/26/2021             Admit Date: 3/22/2021       ICD-10-CM ICD-9-CM   1. Discitis, unspecified, lumbar region  M46.46 722.93   2. Spinal stenosis, lumbar region, with neurogenic claudication  M48.062 724.03   3. Discitis of lumbar region  M46.46 722.93   4. Osteomyelitis of lumbar spine (CMS/[facility])  M46.26 730.28   5. Impaired mobility and ADLs  Z74.09 V49.89    Z78.9    6. Impaired mobility  Z74.09 799.89   7. Iliopsoas abscess (CMS/[facility])  K68.12 567.31   8. Staphylococcus aureus bacteremia  R78.81 790.7    B95.61 041.11   9. Spinal stenosis in cervical region  M48.02 723.0     Patient Active Problem List   Diagnosis   • Eustachian tube dysfunction   • Sensorineural hearing loss   • Lumbago of multiple sites in spine with sciatica   • Spondylolisthesis of lumbar region   • Coronary artery disease   • Hyperlipidemia   • Hypertension   • Myalgia due to statin   • S/P coronary artery stent placement   • Pacemaker   • Seropositive rheumatoid arthritis of multiple sites (CMS/[facility])   • Sick sinus syndrome (CMS/[facility])   • Other osteoporosis without current pathological fracture   • Allergic-infective asthma   • Arthritis of both knees   • Vasovagal syncope   • Bilateral bunions   • Bronchitis   • Cardiac pacemaker syndrome   • Charcot's joint of foot, left   • Constipation   • 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   • Intrinsic asthma   • Left carotid bruit   • Neutropenia (CMS/[facility])   • Obesity   • Primary osteoarthritis of left knee   • Psoriasis vulgaris   • Recurrent acute serous otitis media of both ears   • S/P lumbar laminectomy   • Thrombocytopenia (CMS/[facility])   • Hypothyroidism   • Vitamin D deficiency   • Myxedema heart disease   • Spondylolisthesis of lumbar region   • Syncope, cardiogenic   • Rupture of gluteus minimus tendon   • Muscle tear   • Syncope, recurrent   • Leukocytosis    • Headache   • Elevated CPK   • Staphylococcus aureus bacteremia   • Right hip pain, suspected infection   • Obesity (BMI 30-39.9)   • Stenosis of cervical spine with myelopathy (CMS/HCC)   • Spinal stenosis, lumbar region, with neurogenic claudication   • Discitis of lumbar region   • Osteomyelitis of lumbar spine (CMS/HCC)   • Discitis, unspecified, lumbar region   • Iliopsoas abscess (CMS/HCC)     Past Medical History:   Diagnosis Date   • Arthritis    • Asthma    • Chronic sinusitis    • Coronary artery disease    • Eustachian tube dysfunction    • Heart disease    • Herpes simplex    • Hyperlipidemia    • Hypertension    • Knee dislocation    • Labral tear of right hip joint    • Laryngitis sicca    • Laryngitis, chronic    • MI (myocardial infarction) (CMS/[facility])    • Otorrhea    • Sensorineural hearing loss    • Sjogren's disease (CMS/[facility])      Past Surgical History:   Procedure Laterality Date   • A-V CARDIAC PACEMAKER INSERTION  2016   • ATRIAL CARDIAC PACEMAKER INSERTION     • CARDIAC CATHETERIZATION     • CATARACT EXTRACTION     • CERVICAL CORPECTOMY N/A 3/3/2021    Procedure: CERVICAL 6 CORPECTOMY WITH TITANIUM CAGE WITH NEURO MONITORING;  Surgeon: Bandar Shea MD;  Location:  [facility] OR;  Service: Neurosurgery;  Laterality: N/A;   • COLONOSCOPY  11/08/2011    One fold in the ascending colon which showed ulcer otherwise normal exam   • COLONOSCOPY  11/12/2004    Normal exam repeat in five years   • CORONARY ANGIOPLASTY WITH STENT PLACEMENT      X 2; 2013 & 2014   • ENDOSCOPY  07/10/2014    Normal exam   • HIP ABDUCTION TENOTOMY BILATERAL Right 1/14/2021    Procedure: RIGHT HIP GLUTEUS MEDLUS / MINIMUS REPAIR, POSSIBLE ACHILLES ALLOGRAFT;  Surgeon: Nino Carlson MD;  Location:  [facility] OR;  Service: Orthopedics;  Laterality: Right;   • INCISION AND DRAINAGE HIP Right 2/9/2021    Procedure: HIP INCISION AND DRAINAGE;  Surgeon: Nino Carlson MD;  Location:  [facility];  Service: Orthopedics;  Laterality:  Right;   • JOINT REPLACEMENT     • LUMBAR DISCECTOMY Right 3/23/2021    Procedure: LUMBAR DISCECTOMY MICRO, Lumbar 1/2 right;  Surgeon: Bandar Shea MD;  Location:  [facility];  Service: Neurosurgery;  Laterality: Right;   • LUMBAR FUSION N/A 1/19/2018    Procedure: L3-4,L4-5 DECOMPRESSION, POSTERIOR SPINAL FUSION WITH INSTRUMENTATION;  Surgeon: Fortino Oropeza MD;  Location:  [facility];  Service:    • LUMBAR LAMINECTOMY WITH FUSION Left 1/17/2018    Procedure: LEFT L3-4 L4-5 LATERAL LUMBAR INTERBODY FUSION;  Surgeon: Fortino Oropeza MD;  Location:  [facility];  Service:    • MYRINGOTOMY W/ TUBES  09/04/2014    TUBES NO LONGER IN PLACE   • OTHER SURGICAL HISTORY      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 so hardware was removed and spacers were placed   • REPLACEMENT TOTAL KNEE Right             OT ASSESSMENT FLOWSHEET (last 12 hours)      OT Evaluation and Treatment     Row Name 03/26/21 0940 03/26/21 0815                OT Time and Intention    Subjective Information  complains of;fatigue;pain  -  complains of;pain  -TS       Document Type  therapy note (daily note)  -  therapy note (daily note)  -TS       Mode of Treatment  occupational therapy  -  occupational therapy  -TS       Patient Effort  --  good  -TS       Comment  c-collar, LSO, woundvac, GUILLERMO drain.   -  c collar, LSO, wound vac, GUILLERMO drain  -TS          General Information    Existing Precautions/Restrictions  --  fall;spinal  -TS          Cognition    Personal Safety Interventions  --  fall prevention program maintained;gait belt;nonskid shoes/slippers when out of bed  -TS          Pain Assessment    Additional Documentation  Pain Scale: FACES Pre/Post-Treatment (Group)  -  --          Pain Scale: Numbers Pre/Post-Treatment    Pretreatment Pain Rating  --  3/10  -TS       Posttreatment Pain Rating  --  4/10  -TS       Pain Location - Side  --  Right  -TS       Pain Location - Orientation  --  lower  -TS       Pain Location  --  back  -TS        Pain Intervention(s)  --  Repositioned  -TS          Pain Scale: FACES Pre/Post-Treatment    Pain: FACES Scale, Pretreatment  2-->hurts little bit  -JJ  --       Posttreatment Pain Rating  2-->hurts little bit  -JJ  --       Pain Location - Orientation  incisional  -JJ  --          Functional Mobility    Functional Mobility- Ind. Level  --  standby assist  -TS       Functional Mobility- Device  --  rolling walker  -TS       Functional Mobility- Comment  --  in room  -TS          Transfer Assessment/Treatment    Transfers  sit-stand transfer;stand-sit transfer;toilet transfer  -JJ  sit-stand transfer;stand-sit transfer;toilet transfer  -TS          Transfers    Sit-Stand [place] (Transfers)  verbal cues;contact guard  -JJ  standby assist  -TS       Stand-Sit [place] (Transfers)  verbal cues;contact guard  -JJ  standby assist  -TS       [place] Level (Toilet Transfer)  verbal cues;contact guard  -JJ  standby assist  -TS       Assistive Device (Toilet Transfer)  commode;grab bars/safety frame;walker, front-wheeled  -JJ  commode, bedside without drop arms;walker, front-wheeled  -TS          Sit-Stand Transfer    Assistive Device (Sit-Stand Transfers)  walker, front-wheeled  -JJ  walker, front-wheeled  -TS          Stand-Sit Transfer    Assistive Device (Stand-Sit Transfers)  walker, front-wheeled  -JJ  walker, front-wheeled  -TS          Toilet Transfer    Type (Toilet Transfer)  sit-stand;stand-sit  -JJ  sit-stand;stand-sit  -TS          Activities of Daily Living    BADL Assessment/Intervention  toileting  -JJ  lower body dressing;toileting;grooming  -TS          Upper Body Dressing Assessment/Training    [place] Level (Upper Body Dressing)  maximum assist (25% patient effort);don  -JJ  don;doff  -TS       Position (Upper Body Dressing)  unsupported sitting  -JJ  unsupported sitting  -TS       Comment (Upper Body Dressing)  LSO  -JJ  c collar  -TS          Lower Body Dressing  Assessment/Training    [place] Level (Lower Body Dressing)  don;doff;pants/bottoms;socks;maximum assist (25% patient effort)  -J  don;doff;pants/bottoms;moderate assist (50% patient effort)  -TS       Position (Lower Body Dressing)  unsupported sitting  -JJ  unsupported sitting;unsupported standing  -TS       Comment (Lower Body Dressing)  Pt with large incontient episode, requiring Increased assist for LBD.   -  --          Grooming Assessment/Training    [place] Level (Grooming)  --  oral care regimen;wash face, hands;set up  -TS       Position (Grooming)  --  supported sitting  -TS          Toileting Assessment/Training    [place] Level (Toileting)  adjust/manage clothing;perform perineal hygiene;maximum assist (25% patient effort)  -  toileting skills;supervision;adjust/manage clothing;contact guard assist;perform perineal hygiene;maximum assist (25% patient effort)  -       Assistive Devices (Toileting)  commode;grab bar/safety frame  -  commode, bedside without drop arms  -TS       Position (Toileting)  supported sitting;supported standing  -JJ  supported sitting;supported standing  -TS       Comment (Toileting)  Pt with large incontient episode, requiring increased assistance.   -  --          Spinal Orthosis Management    Wearing Schedule (Spinal Orthosis)  --  remove for hygiene/bathing;wear full-time  -       Orthosis Training (Spinal Orthosis)  --  patient;cleaning/care of orthosis;donning/doffing orthosis;orthosis adjustment;wearing schedule  -TS          Wound 03/03/21 1446 Right anterior neck Incision    Wound - Properties Group Placement Date: 03/03/21  -HW Placement Time: 1446  -HW Present on Hospital Admission: N  -HW Side: Right  -TANIKA Orientation: anterior  -HW Location: neck  -HW Primary Wound Type: Incision  -HW    Retired Wound - Properties Group Date first assessed: 03/03/21  -HW Time first assessed: 1446  -HW Present on Hospital Admission: N  -HW Side: Right  -TANIKA  Location: neck  -HW Primary Wound Type: Incision  -HW       Wound 02/09/21 1715 Right hip Incision    Wound - Properties Group Placement Date: 02/09/21  -SH Placement Time: 1715  -SH Present on Hospital Admission: Y  -SH Side: Right  -SH Location: hip  -SH Primary Wound Type: Incision  -SH    Retired Wound - Properties Group Date first assessed: 02/09/21  -SH Time first assessed: 1715  -SH Present on Hospital Admission: Y  -SH Side: Right  -SH Location: hip  -SH Primary Wound Type: Incision  -SH       Wound 03/23/21 1021 lumbar spine Incision    Wound - Properties Group Placement Date: 03/23/21  -KINGS Placement Time: 1021  -KINGS Location: lumbar spine  -KINGS Primary Wound Type: Incision  -KINGS    Retired Wound - Properties Group Date first assessed: 03/23/21  -KINGS Time first assessed: 1021  -KINGS Location: lumbar spine  -KINGS Primary Wound Type: Incision  -KINGS       NPWT (Negative Pressure Wound Therapy) 02/09/21 1741 right hip    NPWT (Negative Pressure Wound Therapy) - Properties Group Placement Date: 02/09/21  -SH Placement Time: 1741 -SH Location: right hip  -SH Additional Comments: 1 pc of black sponge  -SH    Retired NPWT (Negative Pressure Wound Therapy) - Properties Group Placement Date: 02/09/21  -SH Placement Time: 1741 -SH Location: right hip  -SH Additional Comments: 1 pc of black sponge  -SH       Plan of Care Review    Plan of Care Reviewed With  patient  -QUANG  --       Outcome Summary  OT tx completed. Upon entry to room, pt attempted to amb to bathroom without LSO or assistance. Pt educated on importance of use of LSO and importance of calling for assistance before attempting t/fs or mobility. Pt with large incontient epsiode, requiring Max A for all aspects of toileting, donning/doffing pants, socks and shoes. She was able to amb from chair <> bathroom with rwx and CGA. She continue OT POC.   -JJ  --          Positioning and Restraints    Pre-Treatment Position  sitting in chair/recliner  -JJ  sitting in  chair/recliner  -TS       Post Treatment Position  chair  -JJ  chair  -TS       In Chair  reclined;notified nsg;call light within reach;encouraged to call for assist;with brace  -JJ  sitting;call light within reach;encouraged to call for assist;with brace  -TS          Progress Summary (OT)    Progress Toward Functional Goals (OT)  progress toward functional goals is good  -  --       Daily Progress Summary (OT)  continue OT POC  -  --       Barriers to Overall Progress (OT)  fall/safety awareness.   -J  --          Therapy Plan Review/Discharge Plan (OT)    Anticipated Discharge Disposition (OT)  inpatient rehabilitation facility  -  --         User Key  (r) = Recorded By, (t) = Taken By, (c) = Cosigned By    Initials Name Effective Dates    TS Carolee Giordano, CONTI/L 08/02/16 -     Ewelina Eubanks RN 08/02/16 -     Esequiel Roche RN 08/02/16 -     Trinity Haddad OTR/L 12/04/20 -     Laurie Ritter RN 01/02/19 -     Benjie Melendez RN 06/05/20 -          Occupational Therapy Education                 Title: PT OT SLP Therapies (In Progress)     Topic: Occupational Therapy (In Progress)     Point: ADL training (Done)     Description:   Instruct learner(s) on proper safety adaptation and remediation techniques during self care or transfers.   Instruct in proper use of assistive devices.              Learning Progress Summary           Patient Acceptance, E, VU by QUANG at 3/23/2021 1453   Family Acceptance, E, VU by QUANG at 3/23/2021 1453                   Point: Home exercise program (Not Started)     Description:   Instruct learner(s) on appropriate technique for monitoring, assisting and/or progressing therapeutic exercises/activities.              Learner Progress:  Not documented in this visit.          Point: Precautions (Done)     Description:   Instruct learner(s) on prescribed precautions during self-care and functional transfers.              Learning Progress Summary            Patient Acceptance, E,TB, VU,NR by  at 3/25/2021 0850    Comment: LSO when up    Acceptance, E, VU by QUANG at 3/23/2021 1453   Family Acceptance, E, VU by QUANG at 3/23/2021 1453                   Point: Body mechanics (Done)     Description:   Instruct learner(s) on proper positioning and spine alignment during self-care, functional mobility activities and/or exercises.              Learning Progress Summary           Patient Acceptance, E, VU by QUANG at 3/23/2021 1453   Family Acceptance, E, VU by QUANG at 3/23/2021 1453                               User Key     Initials Effective Dates Name Provider Type Discipline     08/02/16 -  Olga Chambers PTA Physical Therapy Assistant PT     12/04/20 -  Trinity Day OTR/L Occupational Therapist OT                  OT Recommendation and Plan     Plan of Care Review  Plan of Care Reviewed With: patient  Progress: improving  Outcome Summary: Pt c/o pain in R hip around wound vac, RN aware. Pt t/fs with SBA/CGA with RW. Pt completed toileting with SBA. Pt max A to don socks and adjust c collar. Pt would benefit from acute rehab at discharge. Continue OT POC   Plan of Care Reviewed With: patient  Outcome Summary: Pt c/o pain in R hip around wound vac, RN aware. Pt t/fs with SBA/CGA with RW. Pt completed toileting with SBA. Pt max A to don socks and adjust c collar. Pt would benefit from acute rehab at discharge. Continue OT POC     Outcome Measures     Row Name 03/26/21 1300 03/26/21 1000 03/24/21 1400       How much help from another is currently needed...    Putting on and taking off regular lower body clothing?  2  -TS  2  -JJ  2  -TS    Bathing (including washing, rinsing, and drying)  2  -TS  2  -JJ  2  -TS    Toileting (which includes using toilet bed pan or urinal)  3  -TS  3  -JJ  3  -TS    Putting on and taking off regular upper body clothing  3  -TS  2  -JJ  3  -TS    Taking care of personal grooming (such as brushing teeth)  3  -TS  3  -JJ  3  -TS    Eating  meals  4  -TS  4  -JJ  4  -TS    AM-PAC 6 Clicks Score (OT)  17  -TS  16  -JJ  17  -TS       Functional Assessment    Outcome Measure Options  --  AM-PAC 6 Clicks Daily Activity (OT)  -  --      User Key  (r) = Recorded By, (t) = Taken By, (c) = Cosigned By    Initials Name Provider Type     Carolee Giordano, CONTI/L Occupational Therapy Assistant    Trinity Haddad, OTR/L Occupational Therapist          Time Calculation:   Time Calculation- OT     Row Name 03/26/21 1349 03/26/21 1205 03/26/21 1000       Time Calculation- OT    OT Start Time  0815  -  --  0940  -    OT Stop Time  0910  -TS  --  1005  -    OT Time Calculation (min)  55 min  -TS  --  25 min  -    Total Timed Code Minutes- OT  55 minute(s)  -TS  --  25 minute(s)  -    OT Received On  03/26/21  -  --  03/26/21  -       Timed Charges    04047 - Gait Training Minutes   --  23  -  --    51295 - OT Self Care/Mgmt Minutes  55  -TS  --  --      User Key  (r) = Recorded By, (t) = Taken By, (c) = Cosigned By    Initials Name Provider Type     Olga Chambers, PTA Physical Therapy Assistant     Carolee Giordano, CONTI/L Occupational Therapy Assistant    Trinity Haddad, OTR/L Occupational Therapist        Therapy Charges for Today     Code Description Service Date Service Provider Modifiers Qty    [id number]  OT SELF CARE/MGMT/TRAIN EA 15 MIN 3/26/2021 Carolee Giordano CONTI/L GO 4               Carolee NIELSON. GALLITO Giordano/KARUNA  3/26/2021

## 2021-03-26 NOTE — THERAPY DISCHARGE NOTE
Acute Care - Occupational Therapy Discharge Summary  [facility]     Patient Name: Tawny Shin  : 1955  MRN: [id number]    Today's Date: 3/26/2021                 Admit Date: 3/22/2021        OT Recommendation and Plan    Visit Dx:    ICD-10-CM ICD-9-CM   1. Discitis, unspecified, lumbar region  M46.46 722.93   2. Spinal stenosis, lumbar region, with neurogenic claudication  M48.062 724.03   3. Discitis of lumbar region  M46.46 722.93   4. Osteomyelitis of lumbar spine (CMS/[facility])  M46.26 730.28   5. Impaired mobility and ADLs  Z74.09 V49.89    Z78.9    6. Impaired mobility  Z74.09 799.89   7. Iliopsoas abscess (CMS/[facility])  K68.12 567.31   8. Staphylococcus aureus bacteremia  R78.81 790.7    B95.61 041.11   9. Spinal stenosis in cervical region  M48.02 723.0         Time Calculation- OT     Row Name 21 1349 21 1205 21 1000       Time Calculation- OT    OT Start Time  0815  -  --  0940  -    OT Stop Time  0910  -TS  --  1005  -    OT Time Calculation (min)  55 min  -TS  --  25 min  -    Total Timed Code Minutes- OT  55 minute(s)  -TS  --  25 minute(s)  -    OT Received On  21  -  --  21  -       Timed Charges    16155 - Gait Training Minutes   --  23  -  --    11442 - OT Self Care/Mgmt Minutes  55  -TS  --  --      User Key  (r) = Recorded By, (t) = Taken By, (c) = Cosigned By    Initials Name Provider Type     Olga Chambers, PTA Physical Therapy Assistant     Carolee Giordano, CONTI/L Occupational Therapy Assistant    Trinity Haddad OTR/L Occupational Therapist            OT Rehab Goals     Row Name 21 1559             Transfer Goal 1 (OT)    Activity/Assistive Device (Transfer Goal 1, OT)  toilet;shower chair  -MT      [place] Level/Cues Needed (Transfer Goal 1, OT)  standby assist  -MT      Time Frame (Transfer Goal 1, OT)  long term goal (LTG);by discharge  -MT      Progress/Outcome (Transfer Goal 1, OT)  goal not met  -MT          Dressing Goal 1 (OT)    Activity/Device (Dressing Goal 1, OT)  lower body dressing  -MT      [place]/Cues Needed (Dressing Goal 1, OT)  moderate assist (50-74% patient effort)  -MT      Time Frame (Dressing Goal 1, OT)  long term goal (LTG);by discharge  -MT      Progress/Outcome (Dressing Goal 1, OT)  goal not met  -MT         Toileting Goal 1 (OT)    Activity/Device (Toileting Goal 1, OT)  toileting skills, all  -MT      [place] Level/Cues Needed (Toileting Goal 1, OT)  supervision required  -MT      Time Frame (Toileting Goal 1, OT)  long term goal (LTG);by discharge  -MT      Progress/Outcome (Toileting Goal 1, OT)  goal not met  -MT        User Key  (r) = Recorded By, (t) = Taken By, (c) = Cosigned By    Initials Name Provider Type Discipline    MT Mirian Hanks COTA/L Occupational Therapy Assistant OT          Outcome Measures     Row Name 03/26/21 1300 03/26/21 1000 03/24/21 1400       How much help from another is currently needed...    Putting on and taking off regular lower body clothing?  2  -TS  2  -JJ  2  -TS    Bathing (including washing, rinsing, and drying)  2  -TS  2  -JJ  2  -TS    Toileting (which includes using toilet bed pan or urinal)  3  -TS  3  -JJ  3  -TS    Putting on and taking off regular upper body clothing  3  -TS  2  -JJ  3  -TS    Taking care of personal grooming (such as brushing teeth)  3  -TS  3  -JJ  3  -TS    Eating meals  4  -TS  4  -JJ  4  -TS    AM-PAC 6 Clicks Score (OT)  17  -TS  16  -JJ  17  -TS       Functional Assessment    Outcome Measure Options  --  AM-PAC 6 Clicks Daily Activity (OT)  -JJ  --      User Key  (r) = Recorded By, (t) = Taken By, (c) = Cosigned By    Initials Name Provider Type    TS Carolee Giordano CONTI/L Occupational Therapy Assistant    Trinity Haddad OTR/L Occupational Therapist          Timed Therapy Charges  Total Units: 4    Charges  Total Units: 4    Procedure Name Documented Minutes Units Code    HC OT SELF  CARE/MGMT/TRAIN EA 15 MIN 55  4    87320 (CPT®)               Documented Minutes  Total Minutes: 55    Therapy Provided Minutes    84253 - OT Self Care/Mgmt Minutes 55                    OT Discharge Summary  Reason for Discharge: Discharge from facility  Outcomes Achieved: Refer to plan of care for updates on goals achieved  Discharge Destination: Inpatient rehabilitation facility      GUALBERTO Pappas  3/26/2021

## 2021-03-26 NOTE — PLAN OF CARE
[facility]AT[facility]      Marek Dominguez    : 1955  Acct #: [de-identified]  MRN: [id number]   PHYSICIAN:  Arina Waterman MD  Primary Problem    Patient Active Problem List   Diagnosis    Coronary artery disease of native artery of native heart with stable angina pectoris ([facility] [address]    Hypertension    Rheumatoid arteritis ([facility] [address] [facility]    History of pulmonary fibrosis    History of DVT (deep vein thrombosis)    Thyroid disease    Intrinsic asthma    Hypercholesterolemia    Syncope, cardiogenic    Near syncope    Status post placement of implantable loop recorder    Left carotid bruit    S/P coronary artery stent placement    Chest pain    Pacemaker    Sinus node dysfunction (HCC)    Lumbar stenosis with neurogenic claudication    Spondylolisthesis of lumbar region    Leg swelling    Primary osteoarthritis of right knee    Infection of total right knee replacement ([facility] [address]    Myalgia due to statin    Abnormal stress test    Discitis       Rehabilitation Diagnosis:     Discitis [M46.40]       ADMIT DATE:3/26/2021   CARE PLAN     NURSING:  Marek Dominguez while on this unit will:     [x] Be continent of bowel and bladder      [x] Have an adequate number of bowel movements   [] Urinate with no urinary retention >300ml in bladder   [] Complete bladder protocol with asif removal   [x] Maintain O2 SATs at _>90__%   [x] Have pain managed while on ARU        [x] Be pain free by discharge    [x] Have no skin breakdown while on ARU   [x] Have improved skin integrity via wound measurements   [x] Have no signs/symptoms of infection at the wound site  [x] Be free from injury during hospitalization   [x] Complete education with patient/family with understanding demonstrated for:  [x] Adjustment   [x] Other:   Nursing interventions may include bowel/bladder training, education for medical assistive devices, medication education, O2 saturation management, energy conservation, stress management techniques, fall prevention, alarms protocol, seating and positioning, skin/wound care, pressure relief instruction,dressing changes,  infection protection, DVT prophylaxis, and/or assistance with in room safety with transfers to bed, toilet, wheelchair, shower as well as bathroom activities and hygiene. Patient/caregiver education for:   [x] Disease/sustained injury/management      [x] Medication Use   [x] Surgical intervention   [x] Safety   [x] Body mechanics and or joint protection   [x] Health maintenance       IRF-CAT  Bladder and Bowel Continence  Bladder Continence: Incontinent less than daily  Bowel Continence: Always continent       PHYSICAL THERAPY:  Goals:                  Short term goals  Time Frame for Short term goals: 1 WEEK  Short term goal 1: CGA FOR ON BED MOBILITY  Short term goal 2: CGA FOR TRANSFERS  Short term goal 3: CGA AMB 50 FEET WITH AD  Short term goal 4: INDEP W/C PROPEL 48 FEET  Short term goal 5: CGA CAR TRANSFER            Long term goals  Time Frame for Long term goals : 2 WEEKS  Long term goal 1: INDEP ON BED AND TRANSFERS  Long term goal 2: INDPE  FEET WITH AD  Long term goal 3: INDPE W/C PROPEL 150 FEET  Long term goal 4: INDEP CAR TRANSFER  Long term goal 5: INDEP 4 STEP   These goals were reviewed with this patient at the time of assessment and Paresh Calvo is in agreement.      Plan of Care: Frequency:  [x] 5 days per week, 90 minutes per day                             []  5 days per week, 60 minutes per day               Current Treatment Recommendations: Strengthening, ROM, Balance Training, Functional Mobility Training, Transfer Training, Endurance Training, Wheelchair Mobility Training, Gait Training, Stair training, Safety Education & Training, Patient/Caregiver Education & Training, Home Exercise Program, Equipment Evaluation, Education, & procurement    IRF-CAT  Roll Left and Right  Assistance Needed: Supervision or touching assistance  CARE Score: 4  Discharge Goal: Independent  Sit to Lying  Assistance Needed: Partial/moderate assistance  CARE Score: 3  Discharge Goal: Independent  Lying to Sitting on Side of Bed  Assistance Needed: Partial/moderate assistance  CARE Score: 3  Discharge Goal: Independent  Sit to Stand  Assistance Needed: Partial/moderate assistance  CARE Score: 3  Discharge Goal: Independent  Chair/Bed-to-Chair Transfer  Assistance Needed: Partial/moderate assistance  CARE Score: 3  Discharge Goal: Independent  Car Transfer  Reason if not Attempted: Not attempted due to medical condition or safety concerns  CARE Score: 88  Discharge Goal: Independent  Walk 10 Feet  Assistance Needed: Partial/moderate assistance  CARE Score: 3  Discharge Goal: Independent  Walk 50 Feet with Two Turns  Reason if not Attempted: Not attempted due to medical condition or safety concerns  CARE Score: 88  Discharge Goal: Independent  Walk 150 Feet  Reason if not Attempted: Not attempted due to medical condition or safety concerns  CARE Score: 88  Discharge Goal: Independent  Walking 10 Feet on Uneven Surfaces  Reason if not Attempted: Not attempted due to medical condition or safety concerns  CARE Score: 88  Discharge Goal: Not Attempted  1 Step (Curb)  Reason if not Attempted: Not attempted due to medical condition or safety concerns  CARE Score: 88  Discharge Goal: Independent  4 Steps  Reason if not Attempted: Not attempted due to medical condition or safety concerns  CARE Score: 88  Discharge Goal: Independent  12 Steps  Reason if not Attempted: Not attempted due to medical condition or safety concerns  CARE Score: 88  Discharge Goal: Supervision or touching assistance  Wheel 50 Feet with Two Turns  Assistance Needed: Supervision or touching assistance  CARE Score: 4  Discharge Goal: Independent  Wheel 150 Feet  Reason if not Attempted: Not attempted due to medical condition or safety concerns  CARE Score: 88  Discharge Goal: Independent    OCCUPATIONAL THERAPY:  Goals: Short term goals  Time Frame for Short term goals: 1 week  Short term goal 1: pt wll complete UB dressing with min A  Short term goal 2: pt will complete LB dressing with  min A  Short term goal 3: pt will complete overall bathing with min A  Short term goal 4: pt will complete simple ambulatory home making task with min A  Short term goal 5: pt will don/doff back brace with independence  Short term goal 6: pt will complete 1-2 handed standing task for 3 mins with CGA  Short term goal 7: pt will complete R FM/sensory acts to improve function of hand during ADL task  Short term goal 8: Pt will increase B  strength by 2#. Short term goal 9: Pt will decrease B 9 hole peg test time by 2 seconds. :  Long term goals  Time Frame for Long term goals : 2 weeks  Long term goal 1: complete overall dressing with supervision  Long term goal 2: complete overall bathing with supervision  Long term goal 3: complete overall toileting with supervision  Long term goal 4: complete simple ambulatory home making task with supervision  Long term goal 5: complete HEP with independence :     These goals were reviewed with this patient at the time of assessment and Fannie Vazquez is in agreement    Plan of Care: Frequency:   [x] 5 days per week, 90 minutes per day     [] 5 days per week, 60 minutes per day                Plan  Current Treatment Recommendations: Self-Care / ADL, Safety Education & Training, Pain Management, Endurance Training, Functional Mobility Training, Strengthening, Patient/Caregiver Education & Training, Home Management Training, Equipment Evaluation, Education, & procurement, Balance Training, Positioning            IRF-CAT  Eating  Assistance Needed: Supervision or touching assistance  CARE Score: 4  Discharge Goal: Independent  Oral Hygiene  Assistance Needed: Supervision or touching assistance  CARE Score: 4  Discharge Goal: [address] Needed: Partial/moderate assistance Comment: Mod A, one hand on GB at all times  CARE Score: 3  Discharge Goal: Independent  Shower/Bathe Self  Assistance Needed: Partial/moderate assistance  CARE Score: 3  Discharge Goal: Independent  Upper Body Dressing  Assistance Needed: Partial/moderate assistance  CARE Score: 3  Discharge Goal: Independent  Lower Body Dressing  Assistance Needed: Substantial/maximal assistance  CARE Score: 2  Discharge Goal: Independent  Putting On/Taking Off Footwear  Assistance Needed: Substantial/maximal assistance  CARE Score: 2  Discharge Goal: Independent  Toilet Transfer  Assistance Needed: Partial/moderate assistance  Comment: Mod A, w/c<>toilet using GB  CARE Score: 3  Discharge Goal: Independent  Picking Up Object  Reason if not Attempted: Not attempted due to medical condition or safety concerns  CARE Score: 88  Discharge Goal: Partial/moderate assistance  Treatments may include IADL retraining, strengthening, safety education and training, patient/caregiver education and training, equipment evaluation/ training/procurement, neuromuscular reeducation, wheelchair mobility training.     SPEECH THERAPY:   Plan of Care and Goals:   LTG Goal 1: Patient will develop functional, cognitive-linguistic-based skills and utilize compensatory strategies tocommunicate wants and needs effectively to different conversational partners, maintain safety and participate socially in functional living environment  FIM Goals: Comprehension: GOAL: Comprehension: 6  Expression: GOAL: Expression: 6  Social: GOAL: Social Interaction: 6  Problem Solving: GOAL: Problem Solvin  Memory: GOAL: Memory: 5                    Short-term Goals  Goal 1: The patient will provide 15 members in a given category with 90% accuracy and (min)cues in the form of a visual aid, semantic/phonemic cueing, etc.  Goal 2: The patient will demonstrate functional problem solving and safety awareness with 90% verbal,tactile and visual cues for daily living tasks in order to increase safe interaction with environmentand decreased assistance from caregivers  Goal 3: The patient will demonstrate recall of functional information following a/an (immediate, shortterm,long-term) delay with min cues in order to increase functional integration into environment. Goal 4: The patient will recall 8 facts from (sentence, paragraph, several paragraph, page) length material with 90% accuracy and min cueing. Short-term Goals  Goal 1: The patient will tolerate a dysphagia soft and bite sized diet with thin liquids with minimal overt s/s of aspiration. Goal 2: Staff/caregivers will complete daily oral hygiene to prevent aspiration of bacteria laden secretions. Goal 3: The patient will participate in a modified barium swallow study to futher assess swallow function. IRF-CAT  Hearing, Speech, and Vision  Expression of Ideas and Wants: Without difficulty  Understanding Verbal and Non-Verbal Content: Understands  Cognitive Patterns  Cognitive Pattern Assessment Used: BIMS  Repetition of Three Words (First Attempt): 3  Temporal Orientation: Year: Correct  Temporal Orientation: Month: Accurate within 5 days  Temporal Orientation: Day: Correct  Able to recall \"sock: Yes, no cue required  Able to recall \"blue\": Yes, no cue required  Able to recall \"bed\": Yes, no cue required  BIMS Summary Score: 15          Plan of Care:  Frequency:   [x] 5 days per week, 60 minutes per day                            [] Not appropriate for Speech Therapy  Treatments may include speech/language/communication therapy, cognitive training, group therapy, education, and/or dysphagia therapy based on the above goals. These goals were reviewed with this patient at the time of assessment and Tariq Jessica is in agreement. CASE MANAGEMENT:  Goals:   Assist patient/family with discharge planning, patient/family counseling,  and coordination with insurance during ARU stay.   Patient Goals: pain management, mobilize, be able to move self, take care of self as much as possible, finish antibiotics               Activities Prior to Admit:      Active : Yes  Mode of Transportation: Car  Occupation: Retired  Leisure & Hobbies: Read, boating, kayaking         Edagr Rondon will be seen a minimum of 3 hours of therapy per day/a minimum of 5 out of 7 days per week. [] In this rare instance due to the nature of this patient's medical involvement, this patient will be seen 15 hours per week (900 minutes within a 7 day period). Treatments may include therapeutic exercises, gait training, neuromuscular re-ed, transfer training, community reintegration, bed mobility, w/c mobility and training, self care, home mgmt, cognitive training, energy conservation,dysphagia tx, speech/language/communication therapy, group therapy, and patient/family education. In addition, dietician/nutritionist may monitor calorie count as well as intake and collaboratively work with SLP on dietary upgrades. Neuropsychology/Psychology may evaluate and provide necessary support. Medical issues being managed closely and that require 24 hour availability of a physician:   [] Swallowing Precautions  [] Bowel/Bladder Fx  [] Weight bearing precautions   [] Wound Care    [x] Pain Mgmt   [] Infection Protection   [x] DVT Prophylaxis   [] Fall Precautions  [x] Fluid/Electrolyte/Nutrition Balance   [] Voice Protection   [] Respiratory  [] Other:    Medical Prognosis: [] Good  [x] Fair    [] Guarded   Total expected IRF days:  15  Anticipated discharge destination:    [] Home Independently   [x] Home with supervision    []SNF     [] Other                                           Physician anticipated functional outcomes:   Independent household ambulation with assistive device  IPOC brief synthesis: Acute inpatient rehabilitation with occupational   physical therapy, 180 minutes, 5 every 7   days will address basic and advancing mobility with self-care   instruction and adaptive equipment training. Caregiver education will   be offered. Expected length of stay prior to the supervised level of   functional discharge to home with home health services is 15 days. Assessment and Plan:  1. Right psoas abscess and L1/2 discitis/osteomyelitis with generalized weakness and deconditioning. Continue Ancef for another 8 to 12 weeks most likely. PT/OT for generalized weakness and deconditioning  2. GIcontinue with medications for constipation  3. DVT prophylaxissubcu heparin  4. Rheumatoid arthritislow-dose prednisone  5. Coronary artery diseasecontinue Imdur  Will adjust pain medication and ask infectious disease to follow.              Case Mgmt: Electronically signed by OLIVER Tobar on 3/26/2021 at 3:28 PM    OT: Electronically signed by Lashanda Michael OT on 3/27/2021 at 5:26 PM    PT:Electronically signed by Renetta Royal PT on 3/27/21 at 4:11 PM CDT    RN: Electronically signed by Shannan Lindo RN on 3/26/21 at 3:17 PM CDT    ST:   Electronically Signed By:  Vadim Daily M.S., CCC-SLP  3/29/2021,8:59 AM.

## 2021-03-26 NOTE — THERAPY DISCHARGE NOTE
Acute Care - Physical Therapy Discharge Summary  [facility]       Patient Name: Tawny Shin  : 1955  MRN: [id number]    Today's Date: 3/26/2021                 Admit Date: 3/22/2021      PT Recommendation and Plan    Visit Dx:    ICD-10-CM ICD-9-CM   1. Discitis, unspecified, lumbar region  M46.46 722.93   2. Spinal stenosis, lumbar region, with neurogenic claudication  M48.062 724.03   3. Discitis of lumbar region  M46.46 722.93   4. Osteomyelitis of lumbar spine (CMS/[facility])  M46.26 730.28   5. Impaired mobility and ADLs  Z74.09 V49.89    Z78.9    6. Impaired mobility  Z74.09 799.89   7. Iliopsoas abscess (CMS/[facility])  K68.12 567.31   8. Staphylococcus aureus bacteremia  R78.81 790.7    B95.61 041.11   9. Spinal stenosis in cervical region  M48.02 723.0       Outcome Measures     Row Name 21 1300 21 1000 21 1400       How much help from another is currently needed...    Putting on and taking off regular lower body clothing?  2  -TS  2  -JJ  2  -TS    Bathing (including washing, rinsing, and drying)  2  -TS  2  -JJ  2  -TS    Toileting (which includes using toilet bed pan or urinal)  3  -TS  3  -JJ  3  -TS    Putting on and taking off regular upper body clothing  3  -TS  2  -JJ  3  -TS    Taking care of personal grooming (such as brushing teeth)  3  -TS  3  -JJ  3  -TS    Eating meals  4  -TS  4  -JJ  4  -TS    AM-PAC 6 Clicks Score (OT)  17  -TS  16  -JJ  17  -TS       Functional Assessment    Outcome Measure Options  --  AM-PAC 6 Clicks Daily Activity (OT)  -JJ  --      User Key  (r) = Recorded By, (t) = Taken By, (c) = Cosigned By    Initials Name Provider Type    Carolee Roger, CONTI/L Occupational Therapy Assistant    Trinity Haddad, LINDSAYR/L Occupational Therapist          PT Charges     Row Name 21 1205             Time Calculation    Start Time  1045  -AH      Stop Time  1200  -AH      Time Calculation (min)  75 min  -      PT Received On  21  -          Timed Charges    60754 - Gait Training Minutes   23  -        User Key  (r) = Recorded By, (t) = Taken By, (c) = Cosigned By    Initials Name Provider Type    Olga Rao PTA Physical Therapy Assistant          Rehab Goal Summary     Row Name 03/26/21 1438             Physical Therapy Goals    Wound Care Goal Selection (PT)  wound care, PT goal 1;wound care, PT goal 2  -AB         Bed Mobility Goal 1 (PT)    Activity/Assistive Device (Bed Mobility Goal 1, PT)  rolling to left;rolling to right;sit to supine;supine to sit  -AB      [place] Level/Cues Needed (Bed Mobility Goal 1, PT)  standby assist  -AB      Time Frame (Bed Mobility Goal 1, PT)  long term goal (LTG)  -AB      Progress/Outcomes (Bed Mobility Goal 1, PT)  goal not met  -AB         Transfer Goal 1 (PT)    Activity/Assistive Device (Transfer Goal 1, PT)  sit-to-stand/stand-to-sit;bed-to-chair/chair-to-bed;walker, rolling  -AB      [place] Level/Cues Needed (Transfer Goal 1, PT)  standby assist  -AB      Time Frame (Transfer Goal 1, PT)  long term goal (LTG)  -AB      Progress/Outcome (Transfer Goal 1, PT)  goal not met  -AB         Gait Training Goal 1 (PT)    Activity/Assistive Device (Gait Training Goal 1, PT)  gait (walking locomotion);assistive device use;decrease fall risk;improve balance and speed;increase endurance/gait distance;walker, rolling  -AB      [place] Level (Gait Training Goal 1, PT)  contact guard assist  -AB      Distance (Gait Training Goal 1, PT)  75 feet  -AB      Time Frame (Gait Training Goal 1, PT)  long term goal (LTG)  -AB      Progress/Outcome (Gait Training Goal 1, PT)  goal not met  -AB         Wound Care Goal 1 (PT)    Wound Care Goal 1 (PT)  R hip wound dec in length from 15 cm to 13cm  -AB      Time Frame (Wound Care Goal 1, PT)  by discharge  -AB      Progress/Outcome (Wound Care Goal 1, PT)  goal not met  -AB         Wound Care Goal 2 (PT)    Wound Care Goal 2 (PT)  R hip wound dec in depth from  10.5 cm to 9 cm  -AB      Time Frame (Wound Care Goal 2, PT)  by discharge  -AB      Progress/Outcome (Wound Care Goal 2, PT)  goal not met  -AB        User Key  (r) = Recorded By, (t) = Taken By, (c) = Cosigned By    Initials Name Provider Type Discipline    Kasie Carlos, PTA Physical Therapy Assistant PT              PT Discharge Summary  Anticipated Discharge Disposition (PT): inpatient rehabilitation facility  Reason for Discharge: Discharge from facility  Outcomes Achieved: Refer to plan of care for updates on goals achieved  Discharge Destination: Inpatient rehabilitation facility      Kasie Perkins PTA   3/26/2021

## 2021-03-26 NOTE — PLAN OF CARE
Goal Outcome Evaluation:  Plan of Care Reviewed With: patient     Outcome Summary: OT tx completed. Upon entry to room, pt attempted to amb to bathroom without LSO or assistance. Pt educated on importance of use of LSO and importance of calling for assistance before attempting t/fs or mobility. Pt with large incontient epsiode, requiring Max A for all aspects of toileting, donning/doffing pants, socks and shoes. She was able to amb from chair <> bathroom with rwx and CGA. She continue OT POC.

## 2021-03-26 NOTE — PLAN OF CARE
Goal Outcome Evaluation:  Plan of Care Reviewed With: patient  Progress: improving  Outcome Summary: pt in chair, trans sit-stand cga, pt amb 10 feet with rwx cga, trans back to bed min assist, removed wound vac dressing, applied kerlix wet-dry, cover with mepilx with border, picutes taken, pt ready for d/c to rehab

## 2021-03-26 NOTE — PROGRESS NOTES
Paresh Calvo arrived to room # 820. Presented with: s/p back surgery, neck surgery and RLE psoas abcess I&D  Mental Status: Patient is oriented, alert, thought processes intact and able to concentrate and follow conversation. Vitals:    03/26/21 1428   BP: (!) 170/84   Pulse: 80   Resp: 16   Temp: 97.5 °F (36.4 °C)   SpO2: 90%     Patient safety contract and falls prevention contract reviewed with patient Yes. Oriented Patient to room. Call light within reach. Yes.   Needs, issues or concerns expressed at this time: no.      Electronically signed by Ewa Metzger RN on 3/26/2021 at 3:15 PM

## 2021-03-26 NOTE — THERAPY TREATMENT NOTE
Acute Care - Physical Therapy Treatment Note  [facility]     Patient Name: Tawny Shin  : 1955  MRN: [id number]  Today's Date: 3/26/2021           PT Assessment (last 12 hours)      PT Evaluation and Treatment     [facility] Name 21 1045          Physical Therapy Time and Intention    Subjective Information  complains of;weakness;fatigue;pain  -     Document Type  therapy note (daily note)  -     Mode of Treatment  physical therapy  -     Row Name 21 1045          General Information    Existing Precautions/Restrictions  fall;spinal C-collar AAT, LSO,WV,RLE WBAT  -     Row Name 21 1045          Pain Scale: Numbers Pre/Post-Treatment    Pretreatment Pain Rating  5/10  -     Posttreatment Pain Rating  7/10  -     Pain Location - Side  Right  -     Pain Location - Orientation  incisional  -     Pain Location  hip  -[facility] Name 21 1045          Bed Mobility    Sidelying-Sit [place] (Bed Mobility)  -- chair  -     Sit-Sidelying [place] (Bed Mobility)  minimum assist (75% patient effort);verbal cues  -[facility] Name 21 1045          Transfers    Sit-Stand [place] (Transfers)  contact guard;minimum assist (75% patient effort);verbal cues  -     Stand-Sit [place] (Transfers)  contact guard;verbal cues  -[facility] Name 21 1045          Gait/Stairs (Locomotion)    [place] Level (Gait)  contact guard;verbal cues  -     Assistive Device (Gait)  walker, front-wheeled  -     Distance in Feet (Gait)  10  -[facility] Name             Wound 21 1446 Right anterior neck Incision    Wound - Properties Group Placement Date: 21  -HW Placement Time: 144  -HW Present on Hospital Admission: N  -HW Side: Right  - Orientation: anterior  -HW Location: neck  -HW Primary Wound Type: Incision  -HW    Retired Wound - Properties Group Date first assessed: 21  -HW Time first assessed: 1446  -HW Present on Hospital Admission: N  -HW  Side: Right  -TANIKA Location: neck  -HW Primary Wound Type: Incision  -HW    Row Name 03/26/21 1045          Wound 02/09/21 1715 Right hip Incision    Wound - Properties Group Placement Date: 02/09/21  -SH Placement Time: 1715  -SH Present on Hospital Admission: Y  -SH Side: Right  -SH Location: hip  -SH Primary Wound Type: Incision  -SH    Wound Image  Images linked: 2  -AH     Dressing Appearance  intact  -     Wound Length (cm)  15 cm  -     Wound Width (cm)  7 cm  -AH     Wound Depth (cm)  10.5 cm  -     Wound Output (mL)  250 300 total  -AH     Retired Wound - Properties Group Date first assessed: 02/09/21  -SH Time first assessed: 1715  -SH Present on Hospital Admission: Y  -SH Side: Right  -SH Location: hip  -SH Primary Wound Type: Incision  -SH    Row Name             Wound 03/23/21 1021 lumbar spine Incision    Wound - Properties Group Placement Date: 03/23/21  -KINGS Placement Time: 1021  -KINGS Location: lumbar spine  -KINGS Primary Wound Type: Incision  -KINGS    Retired Wound - Properties Group Date first assessed: 03/23/21  -KINGS Time first assessed: 1021  -KINGS Location: lumbar spine  -KINGS Primary Wound Type: Incision  -KINGS    Row Name 03/26/21 1045          NPWT (Negative Pressure Wound Therapy) 02/09/21 1741 right hip    NPWT (Negative Pressure Wound Therapy) - Properties Group Placement Date: 02/09/21  -SH Placement Time: 1741 -SH Location: right hip  -SH Additional Comments: 1 pc of black sponge  -SH    Sponges Inserted  -- kerlix wet-dry,covered with mepilex with border  -AH     Sponges Removed  2 1gray,1 black  -AH     Finger sweep complete  Yes  -     Retired NPWT (Negative Pressure Wound Therapy) - Properties Group Placement Date: 02/09/21  -SH Placement Time: 1741 -SH Location: right hip  -SH Additional Comments: 1 pc of black sponge  -SH    Row Name 03/26/21 1045          Plan of Care Review    Plan of Care Reviewed With  patient  -     Progress  improving  -     Outcome Summary  pt in chair, trans  sit-stand cga, pt amb 10 feet with rwx cga, trans back to bed min assist, removed wound vac dressing, applied kerlix wet-dry, cover with mepilx with border, picutes taken, pt ready for d/c to rehab  [facility]     Row Name 03/26/21 1045          Positioning and Restraints    Pre-Treatment Position  sitting in chair/recliner  [facility]     Post Treatment Position  bed  -     In Bed  supine;call light within reach;encouraged to call for assist;exit alarm on;notified [facility]       User Key  (r) = Recorded By, (t) = Taken By, (c) = Cosigned By    Initials Name Provider Type     Olga Chambers, PTA Physical Therapy Assistant    Ewelina Eubanks, RN Registered Nurse    Esequiel Roche, RN Registered Nurse    Laurie Ritter, RN Registered Nurse    Benjie Melendez, RN Registered Nurse        Physical Therapy Education                 Title: PT OT SLP Therapies (In Progress)     Topic: Physical Therapy (In Progress)     Point: Mobility training (Done)     Learning Progress Summary           Patient Acceptance, E,TB, VU by  at 3/24/2021 1023    Comment: trans    Acceptance, E, VU,NR by  at 3/23/2021 1603    Comment: pt edu on POC, benefits of act, dc plans, LSO brace, spinal precautions, purpose of NPWT   Family Acceptance, E, VU,NR by SB at 3/23/2021 1603    Comment: pt edu on POC, benefits of act, dc plans, LSO brace, spinal precautions, purpose of NPWT                   Point: Home exercise program (Not Started)     Learner Progress:  Not documented in this visit.          Point: Body mechanics (Done)     Learning Progress Summary           Patient Acceptance, E, VU,NR by SB at 3/23/2021 1603    Comment: pt edu on POC, benefits of act, dc plans, LSO brace, spinal precautions, purpose of NPWT   Family Acceptance, E, VU,NR by SB at 3/23/2021 1603    Comment: pt edu on POC, benefits of act, dc plans, LSO brace, spinal precautions, purpose of NPWT                   Point: Precautions (Done)     Learning Progress  Summary           Patient Acceptance, E, VU,NR by SB at 3/23/2021 1603    Comment: pt edu on POC, benefits of act, dc plans, LSO brace, spinal precautions, purpose of NPWT   Family Acceptance, E, VU,NR by SB at 3/23/2021 1603    Comment: pt edu on POC, benefits of act, dc plans, LSO brace, spinal precautions, purpose of NPWT                               User Key     Initials Effective Dates Name Provider Type [facility] 08/02/16 -  Olga Chambers PTA Physical Therapy Assistant PT     10/31/19 -  Alyssa Gallardo, PT DPT Physical Therapist PT              PT Recommendation and Plan     Plan of Care Reviewed With: patient  Progress: improving  Outcome Summary: pt in chair, trans sit-stand cga, pt amb 10 feet with rwx cga, trans back to bed min assist, removed wound vac dressing, applied kerlix wet-dry, cover with mepilx with border, picutes taken, pt ready for d/c to rehab  Outcome Measures     Row Name 03/26/21 1000 03/24/21 1400          How much help from another is currently needed...    Putting on and taking off regular lower body clothing?  2  -JJ  2  -TS     Bathing (including washing, rinsing, and drying)  2  -JJ  2  -TS     Toileting (which includes using toilet bed pan or urinal)  3  -JJ  3  -TS     Putting on and taking off regular upper body clothing  2  -JJ  3  -TS     Taking care of personal grooming (such as brushing teeth)  3  -JJ  3  -TS     Eating meals  4  -JJ  4  -TS     AM-PAC 6 Clicks Score (OT)  16  -JJ  17  -TS        Functional Assessment    Outcome Measure Options  AM-PAC 6 Clicks Daily Activity (OT)  -JJ  --       User Key  (r) = Recorded By, (t) = Taken By, (c) = Cosigned By    Initials Name Provider Type    TS Carolee Giordano COTA/L Occupational Therapy Assistant    Trinity Haddad OTR/L Occupational Therapist           Time Calculation:   PT Charges     Row Name 03/26/21 1205             Time Calculation    Start Time  1045  -AH      Stop Time  1200  -AH      Time  Calculation (min)  75 min  -      PT Received On  03/26/21  -         Timed Charges    09255 - Gait Training Minutes   23  -        User Key  (r) = Recorded By, (t) = Taken By, (c) = Cosigned By    Initials Name Provider Type     Olga Chambers PTA Physical Therapy Assistant        Therapy Charges for Today     Code Description Service Date Service Provider Modifiers Qty    24789385036 HC GAIT TRAINING EA 15 MIN 3/25/2021 Olga Chambers, SERENA GP 2    19346568079 HC GAIT TRAINING EA 15 MIN 3/26/2021 Olga Chambers, PTA GP 2    28365971336 HC PT NEG PRESS WOUND OVER 50SQCM DME2 3/26/2021 Olga Chambers, PTA GP 1          PT G-Codes  Outcome Measure Options: AM-PAC 6 Clicks Daily Activity (OT)  AM-PAC 6 Clicks Score (PT): 17  AM-PAC 6 Clicks Score (OT): 16    Olga Chambers PTA  3/26/2021

## 2021-03-27 LAB
BACTERIA: NEGATIVE /HPF
BILIRUBIN URINE: NEGATIVE
BLOOD, URINE: NEGATIVE
CLARITY: CLEAR
COLOR: YELLOW
CRYSTALS, UA: ABNORMAL /HPF
EPITHELIAL CELLS, UA: 6 /HPF (ref 0–5)
GLUCOSE URINE: NEGATIVE MG/DL
HYALINE CASTS: 8 /HPF (ref 0–8)
KETONES, URINE: 15 MG/DL
LEUKOCYTE ESTERASE, URINE: ABNORMAL
NITRITE, URINE: NEGATIVE
PH UA: 7.5 (ref 5–8)
PROTEIN UA: 30 MG/DL
RBC UA: 4 /HPF (ref 0–4)
SPECIFIC GRAVITY UA: 1.02 (ref 1–1.03)
UROBILINOGEN, URINE: 0.2 E.U./DL
WBC UA: 4 /HPF (ref 0–5)

## 2021-03-27 PROCEDURE — 97116 GAIT TRAINING THERAPY: CPT

## 2021-03-27 PROCEDURE — 1180000000 HC REHAB R&B

## 2021-03-27 PROCEDURE — 99223 1ST HOSP IP/OBS HIGH 75: CPT | Performed by: PSYCHIATRY & NEUROLOGY

## 2021-03-27 PROCEDURE — 97110 THERAPEUTIC EXERCISES: CPT

## 2021-03-27 PROCEDURE — 97165 OT EVAL LOW COMPLEX 30 MIN: CPT

## 2021-03-27 PROCEDURE — 81001 URINALYSIS AUTO W/SCOPE: CPT

## 2021-03-27 PROCEDURE — 6370000000 HC RX 637 (ALT 250 FOR IP): Performed by: PSYCHIATRY & NEUROLOGY

## 2021-03-27 PROCEDURE — 97161 PT EVAL LOW COMPLEX 20 MIN: CPT

## 2021-03-27 PROCEDURE — 2580000003 HC RX 258: Performed by: PSYCHIATRY & NEUROLOGY

## 2021-03-27 PROCEDURE — 6360000002 HC RX W HCPCS: Performed by: PSYCHIATRY & NEUROLOGY

## 2021-03-27 PROCEDURE — 97535 SELF CARE MNGMENT TRAINING: CPT

## 2021-03-27 RX ORDER — OXYCODONE HYDROCHLORIDE 5 MG/1
15 TABLET ORAL 4 TIMES DAILY
Status: DISCONTINUED | OUTPATIENT
Start: 2021-03-27 | End: 2021-04-12 | Stop reason: HOSPADM

## 2021-03-27 RX ADMIN — GUAIFENESIN 1200 MG: 600 TABLET, EXTENDED RELEASE ORAL at 08:01

## 2021-03-27 RX ADMIN — ISOSORBIDE MONONITRATE 60 MG: 60 TABLET, EXTENDED RELEASE ORAL at 08:00

## 2021-03-27 RX ADMIN — DOCUSATE SODIUM 100 MG: 100 CAPSULE, LIQUID FILLED ORAL at 08:01

## 2021-03-27 RX ADMIN — DOCUSATE SODIUM 50 MG AND SENNOSIDES 8.6 MG 2 TABLET: 8.6; 5 TABLET, FILM COATED ORAL at 16:54

## 2021-03-27 RX ADMIN — ACYCLOVIR 400 MG: 200 CAPSULE ORAL at 21:06

## 2021-03-27 RX ADMIN — GABAPENTIN 100 MG: 100 CAPSULE ORAL at 08:01

## 2021-03-27 RX ADMIN — DOCUSATE SODIUM 50 MG AND SENNOSIDES 8.6 MG 2 TABLET: 8.6; 5 TABLET, FILM COATED ORAL at 08:01

## 2021-03-27 RX ADMIN — Medication 1 TABLET: at 08:01

## 2021-03-27 RX ADMIN — WATER 2000 MG: 1 INJECTION INTRAMUSCULAR; INTRAVENOUS; SUBCUTANEOUS at 08:02

## 2021-03-27 RX ADMIN — OXYCODONE 15 MG: 5 TABLET ORAL at 08:27

## 2021-03-27 RX ADMIN — ISOSORBIDE MONONITRATE 60 MG: 60 TABLET, EXTENDED RELEASE ORAL at 21:06

## 2021-03-27 RX ADMIN — GUAIFENESIN 1200 MG: 600 TABLET, EXTENDED RELEASE ORAL at 21:06

## 2021-03-27 RX ADMIN — PANTOPRAZOLE SODIUM 40 MG: 40 TABLET, DELAYED RELEASE ORAL at 08:00

## 2021-03-27 RX ADMIN — HEPARIN SODIUM 5000 UNITS: 5000 INJECTION INTRAVENOUS; SUBCUTANEOUS at 21:07

## 2021-03-27 RX ADMIN — WATER 2000 MG: 1 INJECTION INTRAMUSCULAR; INTRAVENOUS; SUBCUTANEOUS at 01:29

## 2021-03-27 RX ADMIN — OXYCODONE 15 MG: 5 TABLET ORAL at 21:06

## 2021-03-27 RX ADMIN — GABAPENTIN 100 MG: 100 CAPSULE ORAL at 21:06

## 2021-03-27 RX ADMIN — LEVOTHYROXINE SODIUM 75 MCG: 75 TABLET ORAL at 06:05

## 2021-03-27 RX ADMIN — CARVEDILOL 25 MG: 25 TABLET, FILM COATED ORAL at 08:03

## 2021-03-27 RX ADMIN — WATER 2000 MG: 1 INJECTION INTRAMUSCULAR; INTRAVENOUS; SUBCUTANEOUS at 16:54

## 2021-03-27 RX ADMIN — NALOXEGOL OXALATE 25 MG: 12.5 TABLET, FILM COATED ORAL at 08:00

## 2021-03-27 RX ADMIN — HEPARIN SODIUM 5000 UNITS: 5000 INJECTION INTRAVENOUS; SUBCUTANEOUS at 08:13

## 2021-03-27 RX ADMIN — PREDNISONE 2 MG: 1 TABLET ORAL at 08:00

## 2021-03-27 RX ADMIN — ONDANSETRON HYDROCHLORIDE 4 MG: 4 TABLET, FILM COATED ORAL at 01:37

## 2021-03-27 RX ADMIN — OXYCODONE HYDROCHLORIDE AND ACETAMINOPHEN 1 TABLET: 10; 325 TABLET ORAL at 06:05

## 2021-03-27 RX ADMIN — DOCUSATE SODIUM 100 MG: 100 CAPSULE, LIQUID FILLED ORAL at 21:06

## 2021-03-27 RX ADMIN — OXYCODONE 15 MG: 5 TABLET ORAL at 13:47

## 2021-03-27 RX ADMIN — ACYCLOVIR 400 MG: 200 CAPSULE ORAL at 08:00

## 2021-03-27 RX ADMIN — OXYCODONE 15 MG: 5 TABLET ORAL at 16:54

## 2021-03-27 RX ADMIN — ACETAMINOPHEN 650 MG: 325 TABLET ORAL at 11:19

## 2021-03-27 RX ADMIN — CARVEDILOL 25 MG: 25 TABLET, FILM COATED ORAL at 16:54

## 2021-03-27 ASSESSMENT — PAIN DESCRIPTION - PROGRESSION
CLINICAL_PROGRESSION: GRADUALLY IMPROVING
CLINICAL_PROGRESSION: NOT CHANGED
CLINICAL_PROGRESSION: NOT CHANGED

## 2021-03-27 ASSESSMENT — PAIN DESCRIPTION - ORIENTATION
ORIENTATION: RIGHT

## 2021-03-27 ASSESSMENT — PAIN DESCRIPTION - ONSET
ONSET: ON-GOING
ONSET: GRADUAL

## 2021-03-27 ASSESSMENT — PAIN DESCRIPTION - PAIN TYPE
TYPE: ACUTE PAIN;SURGICAL PAIN
TYPE: ACUTE PAIN;SURGICAL PAIN

## 2021-03-27 ASSESSMENT — PAIN DESCRIPTION - LOCATION
LOCATION: HIP
LOCATION: HIP
LOCATION: BACK

## 2021-03-27 ASSESSMENT — PAIN SCALES - GENERAL
PAINLEVEL_OUTOF10: 6
PAINLEVEL_OUTOF10: 5
PAINLEVEL_OUTOF10: 4
PAINLEVEL_OUTOF10: 5
PAINLEVEL_OUTOF10: 0
PAINLEVEL_OUTOF10: 7
PAINLEVEL_OUTOF10: 4

## 2021-03-27 ASSESSMENT — PAIN DESCRIPTION - FREQUENCY: FREQUENCY: CONTINUOUS

## 2021-03-27 ASSESSMENT — PAIN DESCRIPTION - DIRECTION: RADIATING_TOWARDS: NO

## 2021-03-27 NOTE — PROGRESS NOTES
Comprehensive Nutrition Assessment    Type and Reason for Visit:  Initial, Consult    Nutrition Recommendations/Plan: Obtain preferences, modify diet to include soft foods    Nutrition Assessment:  Consult received for difficulty swallowing since neck sx. Pt presents adequatley nourished AEB PO intake >75% so far. Wt loss of 12# documented over the past 3 months. Obtained preferences and altered diet to include soft restriction per pt request. Will monitor nutrition progression and implement intervention as needed. Malnutrition Assessment:  Malnutrition Status: At risk for malnutrition (Comment)    Context:  Acute Illness       Estimated Daily Nutrient Needs:  Energy (kcal):  3850-7939; Weight Used for Energy Requirements:  Current     Protein (g):  ; Weight Used for Protein Requirements:  Ideal(1.2-2.0)        Fluid (ml/day):  8751-7344; Method Used for Fluid Requirements:  1 ml/kcal      Nutrition Related Findings:  +1 LE edema      Wounds:  Surgical Incision(R hip and neck incisions)       Current Nutrition Therapies:    DIET GENERAL; Dental Soft    Anthropometric Measures:  · Height: 5' 6\" (167.6 cm)  · Current Body Weight: 197 lb 0.9 oz (89.4 kg)   · Admission Body Weight: 197 lb 0.9 oz (89.4 kg)      · Ideal Body Weight: 130 lbs; % Ideal Body Weight 151.6 %   · BMI: 31.8  · BMI Categories: Obese Class 1 (BMI 30.0-34. 9)       Nutrition Diagnosis:   · Inadequate energy intake related to acute injury/trauma as evidenced by poor intake prior to admission      Nutrition Interventions:   Food and/or Nutrient Delivery:  Modify Current Diet  Nutrition Education/Counseling:  No recommendation at this time   Coordination of Nutrition Care:  Continue to monitor while inpatient    Goals:  PO intake >50%, wt stable       Nutrition Monitoring and Evaluation:   Behavioral-Environmental Outcomes:  None Identified   Food/Nutrient Intake Outcomes:  Food and Nutrient Intake  Physical Signs/Symptoms Outcomes: Biochemical Data, Chewing or Swallowing, Skin, Weight     Discharge Planning:     Too soon to determine     Electronically signed by Christelle Cintron RD, LD on 3/27/21 at 11:41 AM CDT    Contact: [phone]

## 2021-03-27 NOTE — PLAN OF CARE
Outcome: Ongoing     Problem: DISCHARGE BARRIERS  Goal: Patient's continuum of care needs are met  Outcome: Ongoing     Problem: IP BLADDER/VOIDING  Goal: LTG - Patient will utilize adaptive techniques/equipment to complete bladder elimination  Outcome: Ongoing  Goal: STG - Patient demonstrates no accidents  Outcome: Ongoing  Goal: STG - patient participates in bladder program by expressing need to void  Outcome: Ongoing     Problem: IP BOWEL ELIMINATION  Goal: LTG - patient will have regular and routine bowel evacuation  Outcome: Ongoing     Problem: IP BREATHING  Goal: STG - respiratory rate and effort will be within normal limits for the patient  Outcome: Ongoing     Problem: NUTRITION  Goal: Patient/Family demonstrates understanding of diet  Outcome: Ongoing     Problem: Mobility - Impaired:  Goal: Mobility will improve  Description: Mobility will improve  Outcome: Ongoing     Problem:  Activity:  Goal: Ability to tolerate increased activity will improve  Description: Ability to tolerate increased activity will improve  Outcome: Ongoing

## 2021-03-27 NOTE — PLAN OF CARE
Goal Outcome Evaluation:  Plan of Care Reviewed With: patient  Progress: no change  Outcome Summary: discharge   

## 2021-03-27 NOTE — CONSULTS
INFECTIOUS DISEASES CONSULT NOTE    Patient:  Amy Torres 77 y.o. female  ROOM # [unfilled]  YOB: 1955  MRN: [id number]  [facility]:  [id number]  Admit date: 3/26/2021   Admitting Physician: Jermaine Domingo MD  Primary Care Physician: Darrell Reilly MD  REFERRING PROVIDER: No ref. provider found    Reason for Consultation: Assistance with antibiotic management. On treatment for MSSA bloodstream infection, right psoas abscess, lumbar discitis/vertebral osteomyelitis. History of Present Illness/Chief Complaint: Pleasant 67-year-old woman. Known to me from consultation at [facility]. She had had recent MSSA bacteremia. She had had right hip wound infection following gluteus muscle repair. She has lumbar discitis. She also had recent surgery for decompression for cervical stenosis. She also had laminectomy for lumbar stenosis. She has a history of rheumatoid arthritis. She has been receiving IV cefazolin treatment. She has been without fever. She has been hemodynamically stable. She was transferred to [facility] for additional acute rehab. She indicates she was able to work with therapy for a few hours today. She indicates her discomfort in right hip and low back better than it had been recently been absent. Infectious disease asked to continue to follow and assist with her management.     Current Scheduled Medications:    oxyCODONE  15 mg Oral 4x Daily    furosemide  40 mg Oral Daily    levothyroxine  75 mcg Oral Daily    acyclovir  400 mg Oral BID    pantoprazole  40 mg Oral Daily    predniSONE  2 mg Oral Daily    docusate sodium  100 mg Oral BID    therapeutic multivitamin-minerals  1 tablet Oral Daily    isosorbide mononitrate  60 mg Oral BID    carvedilol  25 mg Oral BID WC    guaiFENesin  1,200 mg Oral BID    gabapentin  100 mg Oral Q12H    heparin (porcine)  5,000 Units Subcutaneous Q12H    lidocaine  1 patch Transdermal Daily    naloxegol  25 mg Oral QAM    polyethylene glycol  17 g Oral Daily    sennosides-docusate sodium  2 tablet Oral BID    ceFAZolin  2,000 mg Intravenous Q8H     Current PRN Medications:  cloNIDine, nitroGLYCERIN, bisacodyl, budesonide, cyclobenzaprine, magnesium hydroxide, ondansetron, acetaminophen, bisacodyl    Allergies: Allergies   Allergen Reactions    Amoxicillin Rash    Lipitor Rash    Niaspan [Niacin Er] Rash    Penicillins Rash    Relafen [Nabumetone] Rash    Zocor [Simvastatin] Rash     Muscle cramps     Past Medical History: Right hip gluteus muscle tear. Cervical spine stenosis. Lumbar discitis. Lumbar spine stenosis. Methicillin susceptible Staphylococcus aureus bacteremia. Degenerative arthritis. Coronary artery disease. Herpes simplex. Hypertension. Rheumatoid arthritis. Sjogren's syndrome. Sensorineural hearing loss.     Past Surgical History: Two aspirations of right psoas abscess. Cervical spine decompression. Lumbar discitis debridement/laminectomy. Pacemaker insertion. Cataract extraction. Coronary artery stenting. Knee arthroplasty. Lumbar laminectomy. Myringotomy tube placement.     Social History: . No tobacco, alcohol, or illicit drug use.     Family History: Cancer and heart disease.     Exposure History: No close contacts have been ill    Review of Systems: No productive cough. No sputum production. No diarrhea. No rash. No pain at IV site. Vital Signs:  BP (!) 167/80   Pulse 90   Temp 96.7 °F (35.9 °C) (Temporal)   Resp 18   Ht 5' 6\" (1.676 m)   Wt 197 lb 0.9 oz (89.4 kg)   SpO2 92%   BMI 31.81 kg/m²  Temp (24hrs), Av.2 °F (36.2 °C), Min:96.7 °F (35.9 °C), Max:97.6 °F (36.4 °C)    Physical Exam:   Vital signs reviewed. General alert, pleasant, no distress  Heart without murmur  Lungs without crackles  Abdomen soft and nontender  Right hip wound with packing in place large right hip wound. There is some exudate on the dressing. No odor. No surrounding erythema.   She has intact sensation both lower extremities  Motor findings upper and lower extremities stable. Left arm PICC without erythema or induration    Lab Results:  CBC:   Recent Labs     03/26/21  1518   WBC 6.2   HGB 10.4*   HCT 33.8*      LYMPHOPCT 21.9   MONOPCT 14.1*     Cullture:   Blood cultures February 8, 2021 through February 10, 2021-MSSA  (Susceptibility outlined below)  Blood cultures February 11, 2021-no growth  BLood cultures February 12, 2021-no growth  Right psoas abscess cultures March 2, 2021-MSSA   Right psoas abscess cultures March 22, 2021-MSSA    Susceptibility testing on blood culture from February 8, 2021:  Susceptibility    Staphylococcus aureus   TEMI   Gentamicin <=0.5 ug/ml Susceptible   Oxacillin 0.5 ug/ml Susceptible   Rifampin <=0.5 ug/ml Susceptible   Vancomycin 1 ug/ml Susceptible     Susceptibility testing on right psoas abscess culture from March 22, 2021:  Susceptibility    Staphylococcus aureus   TEMI   Gentamicin <=0.5 ug/ml Susceptible   Oxacillin 0.5 ug/ml Susceptible   Rifampin <=0.5 ug/ml Susceptible   Vancomycin <=0.5 ug/ml Susceptible     Radiology: None at [facility]    Impression:   1. MSSA bacteremia-under treatment  2. Right psoas abscess-she has had 2 aspirations  3. Discitis/osteomyelitis at L1-L2-had   4. Lumbar surgery on March 23, 2021-decompressive laminectomy  5. Previous cervical stenosis-underwent surgical decompression  6. Rheumatoid arthritis  7. Previous history pacemaker placement    Recommendations:    She is currently day 40 of antibiotic treatment for MSSA bacteremia, right psoas abscess, discitis/osteomyelitis  Planning 8 to 12-week course course of treatment  See antibiotic recommendations outlined below  Continue physical therapy  Will continue to follow    Antibiotic recommendations:  1.  Diagnosis-MSSA bacteremia, right psoas abscess, L1/L2 discitis/vertebral osteomyelitis, postop wound infection right gluteal muscle tear repair, rheumatoid

## 2021-03-27 NOTE — PLAN OF CARE
Problem: Nutrition  Goal: Optimal nutrition therapy  Outcome: Ongoing   Nutrition Problem #1: Inadequate energy intake  Intervention: Food and/or Nutrient Delivery: Modify Current Diet  Nutritional Goals: PO intake >50%, wt stable

## 2021-03-27 NOTE — PROGRESS NOTES
Kirstin Brunner   [id number]     03/27/21 1540 03/27/21 1541 03/27/21 1543   Restrictions/Precautions   Restrictions/Precautions Fall Risk;Weight Bearing  --   --    Required Braces or Orthoses? Yes  --   --    Lower Extremity Weight Bearing Restrictions   Right Lower Extremity Weight Bearing Weight Bearing As Tolerated  --   --    Required Braces or Orthoses   Cervical c-collar  --   --    Spinal Lumbar Corset  --   --    Position Activity Restriction   Spinal Precautions No Bending; No Lifting; No Twisting  --   --    Subjective   Subjective  --  Pt agreed to therapy this afternoon. --    Pain Screening   Patient Currently in Pain  --  Yes  --    Pain Assessment   Pain Assessment  --  0-10  --    Pain Level  --  4  --    Patient's Stated Pain Goal  --  No pain  --    Pain Type  --  Acute pain;Surgical pain  --    Pain Location  --  Hip  --    Pain Orientation  --  Right  --    Pain Descriptors  --  Aching; Throbbing  --    Pain Onset  --  On-going  --    Clinical Progression  --  Not changed  --    Functional Pain Assessment  --  Activities are not prevented  --    Non-Pharmaceutical Pain Intervention(s)  --  Repositioned; Rest  --    Response to Pain Intervention  --  Patient Satisfied  --    Bed Mobility   Rolling  --   --  Contact guard assistance  (Unable to roll to the right due to dressing)   Supine to Sit  --   --  Minimal assistance;Contact guard assistance   Sit to Supine  --   --  Minimal assistance  (assist with Aubrey LE into bed)   Transfers   Sit to Stand  --   --   --    Stand to sit  --   --   --    Exercises   Comments  --   --   --    Conditions Requiring Skilled Therapeutic Intervention   Body structures, Functions, Activity limitations  --   --   --    Assessment  --   --   --    Activity Tolerance   Activity Tolerance  --   --   --    Safety Devices   Type of devices  --   --   --       03/27/21 1544 03/27/21 1610 03/27/21 1611   Restrictions/Precautions   Restrictions/Precautions  --   --   -- Required Braces or Orthoses?  --   --   --    Lower Extremity Weight Bearing Restrictions   Right Lower Extremity Weight Bearing  --   --   --    Required Braces or Orthoses   Cervical  --   --   --    Spinal  --   --   --    Position Activity Restriction   Spinal Precautions  --   --   --    Subjective   Subjective  --   --   --    Pain Screening   Patient Currently in Pain  --   --   --    Pain Assessment   Pain Assessment  --   --   --    Pain Level  --   --   --    Patient's Stated Pain Goal  --   --   --    Pain Type  --   --   --    Pain Location  --   --   --    Pain Orientation  --   --   --    Pain Descriptors  --   --   --    Pain Onset  --   --   --    Clinical Progression  --   --   --    Functional Pain Assessment  --   --   --    Non-Pharmaceutical Pain Intervention(s)  --   --   --    Response to Pain Intervention  --   --   --    Bed Mobility   Rolling  --   --   --    Supine to Sit  --   --   --    Sit to Supine  --   --   --    Transfers   Sit to Stand Minimal Assistance;Contact guard assistance  --   --    Stand to sit Contact guard assistance  --   --    Exercises   Comments  --  Sitting Aubrey LE ther ex x 10-15 reps. AAROM hip flexion and LAQ on RLE  --    Conditions Requiring Skilled Therapeutic Intervention   Body structures, Functions, Activity limitations  --   --  Decreased functional mobility ; Decreased ROM; Decreased strength;Decreased endurance;Decreased balance;Decreased fine motor control;Decreased coordination; Increased pain   Assessment  --   --  Pt tolerated therapy session well. Required several resting periods during sitting Aubrey LE ther ex due to pain and discomfort in R hip. AAROM with hip flexion and LAQ in RLE. Pt could initiate motion but needed assistance to complete exercise. Assisted pt to [facility] at start of session.    Activity Tolerance   Activity Tolerance  --   --  Patient limited by pain   Safety Devices   Type of devices  --   --   --       03/27/21 4144 Restrictions/Precautions   Restrictions/Precautions  --    Required Braces or Orthoses? --    Lower Extremity Weight Bearing Restrictions   Right Lower Extremity Weight Bearing  --    Required Braces or Orthoses   Cervical  --    Spinal  --    Position Activity Restriction   Spinal Precautions  --    Subjective   Subjective  --    Pain Screening   Patient Currently in Pain  --    Pain Assessment   Pain Assessment  --    Pain Level  --    Patient's Stated Pain Goal  --    Pain Type  --    Pain Location  --    Pain Orientation  --    Pain Descriptors  --    Pain Onset  --    Clinical Progression  --    Functional Pain Assessment  --    Non-Pharmaceutical Pain Intervention(s)  --    Response to Pain Intervention  --    Bed Mobility   Rolling  --    Supine to Sit  --    Sit to Supine  --    Transfers   Sit to Stand  --    Stand to sit  --    Exercises   Comments  --    Conditions Requiring Skilled Therapeutic Intervention   Body structures, Functions, Activity limitations  --    Assessment  --    Activity Tolerance   Activity Tolerance  --    Safety Devices   Type of devices Bed alarm in place;Call light within reach; Left in bed   Electronically signed by Santiago Gorman PTA on 3/27/2021 at 4:20 PM

## 2021-03-27 NOTE — H&P
[facility]   History and Physical        Patient:   Timbo Ivy  MR#:    [id number]  Account Number:                   [id number]      Room:    [address] [place] [address]   YOB: 1955  Date of Progress Note: 3/27/2021  Time of Note                           7:32 AM  Attending Physician:  Esther Ken MD        Admitting diagnosis: Nontraumatic spinal cord injury    Secondary diagnoses: Gluteal abscess, recent cervical decompression, MSSA on long-term antibiotics due to recent discitis,, L1-2 discectomy, rheumatoid arthritis    CHIEF COMPLAINT: Generalized weakness and deconditioning      HISTORY OF PRESENT ILLNESS:   This 77 y.o. female  admitted to [facility] initially on 1/14/2021. The patient had been in her usual state of health when she developed a torn right gluteal muscle. She underwent surgical repair on 1/14/21 and post-operative course was progressing. At her 4 week follow up, she developed increased bloody drainage from the wound with erythema. She presented for injection due to RA at which time she suffered a syncopal event. This was felt to be a vasovagal response and she returned to home. She was found later by a family member in the floor for an unknown length of time and the dressing to the right hip/gluteal wound was saturated. She presented to the hospital 2/8 and was noted to be febrile on intake. Workup revealed an elevated CPK and d dimer. Wound cultures were obtained. The patient was admitted to the service of the hospitalists at that time. She underwent pacemaker interrogation in workup for her syncopal episodes which detected no abnormalities. MRI brain negative for acute process. Purulent drainage from the wound was cultured and the patient was started on broad spectrum antibiotics. She was seen in consultation by orthopedics. Blood cultures returned positive for MSSA in all bottles. ID was consulted for antibiotic management at that time.  She underwent debridement of the wound per ortho on 2/9. Post-operatively, the wound measures 20 cm x 6 cm and wound vac therapy was initiated. Surveillance cultures remained positive and surgical wound cultures positive for e. Coli. She continued with antibiotic therapy with Azactam under direction of ID. Due to persistent bacteremia, she underwent ALFREDA which did not reveal any valvular vegetations. Due to her need for continued IV antibiotic therapy, wound care and rehabilitation, she transferred to the [facility]. She was seen in consultation by our wound care team and was followed by ID. She developed increased complaints of back pain and imaging revealed a right psoas abscess as well as possible L1-2 osteomyelitis discitis. Inflammatory markers were also elevated. The patient had also complained of increased weakness and incoordination to bilateral upper extremities. Neurosurgery was consulted and recommended cervical decompression as well as continued antibiotic therapy with plans for extension of lumbar fusion. She transferred [facility] [place] on 3/3 and underwent cervical corpectomy and decompression. She tolerated the procedure without difficulty and returned to our facility on 3/6. The patient was initially progressing well with therapy upon her return, but she developed increased right hip pain impeding further progress. Multiple regimen adjustments were undertaken without relief. She continued on antibiotic treatment under the direction of ID and continued wound care under the direction of our wound care team. She developed increased edema to BLE requiring gentle diuresis. Orthopedic surgery was consulted to evaluate the hip who felt it was more likely related to the persistent psoas abscess noted on imaging. Originally, the patient was scheduled for repeat drainage of the abscess on 3/19, but she was going to require sedation and had eaten breakfast. The procedure was then rescheduled for 3/22.   Neurosurgery then did an L1-L2 discectomy on 3/23/21 for ongoing discitis osteomyelitis in that region that has thus far not responded to antibiotic therapy. Following I and D of psoas abscess, the patient was discharge from the [facility] to [facility] for planned neurosurgical procedure. Pt tolerated procedure well. She was fitted for a LSO brace. Dr Boogie Sers consulted for antibiotic recommendations. She is now medically stable and is particpating w/therapy. She is ready to begin rehab w/plan of returning home after rehab dc w/support from her family. REVIEW OF SYSTEMS:  Constitutional  No fever or chills. No diaphoresis or significant fatigue. HENT   No tinnitus or significant hearing loss. Eyes  no sudden vision change or eye pain  Respiratory  no significant shortness of breath or cough  Cardiovascular  no chest pain No palpitations or significant leg swelling  Gastrointestinal  no abdominal swelling or pain. Genitourinary  No difficulty urinating, dysuria  Musculoskeletal  no back pain or myalgia. Skin  no color change or rash  Neurologic  No seizures. No lateralizing weakness. Hematologic  no easy bruising or excessive bleeding. Psychiatric  no severe anxiety or nervousness. All other review of systems are negative. Past Medical History:      Diagnosis Date    CAD (coronary artery disease)     S/p Stent to right coronary artery 9/2010. S/p myocardial infarction May 2013.     Cellulitis     LT LEG IN PAST    History of blood transfusion     History of DVT (deep vein thrombosis)     LT    History of neutropenia     History of pulmonary fibrosis     Hx of blood clots 1992    LT LEG    Hypercholesterolemia     Hypertension     Intrinsic asthma     Pacemaker 11/17/2016    Post op infection     \"right knee leaking after my replacement\"    Rheumatoid arteritis ([address]     Sinus node dysfunction ([address]     Staph aureus infection     Status post placement of implantable loop recorder 2/13/15, 3/30/15    2/19/15  removed, infection    Syncope 2/11/2015  Thyroid disease     HYPOTHYROIDISM       Past Surgical History:      Procedure Laterality Date    BACK SURGERY  01/01/2018    CARDIAC CATHETERIZATION  5/14/14  [facility]    normal LV function. EF 60%    CARDIAC CATHETERIZATION  1/26/15  [facility]    EF 60%, Left circ has long 80-90% in small 2mm vessel, diffuse disease in RCA    CORONARY ANGIOPLASTY WITH STENT PLACEMENT  2014    DIAGNOSTIC CARDIAC CATH LAB PROCEDURE      Right coronary artery stent.  9/2010    JOINT REPLACEMENT      LEEP      PACEMAKER INSERTION  11/17/2016    Dr Yumiko Diaz; medtronic    PACEMAKER PLACEMENT      medtronic    CT INCIS/DRAIN THIGH/KNEE ABSCESS,DEEP Right 10/23/2018    KNEE INCISION AND DRAINAGE performed by Reynold Michaud MD at [facility] Right 9/11/2018    KNEE TOTAL ARTHROPLASTY performed by Reynold Michaud MD at [place] [address] Right 12/6/2018    KNEE EXPLANT AND PLACEMENT OF ANTIBIOTIC SPACER performed by Reynold Michaud MD at [address] [place] [address]       Medications in Hospital:      Current Facility-Administered Medications:     furosemide (LASIX) tablet 40 mg, 40 mg, Oral, Daily, Kalen Belle MD    levothyroxine (SYNTHROID) tablet 75 mcg, 75 mcg, Oral, Daily, Kalen Belle MD, 75 mcg at 03/27/21 0605    cloNIDine (CATAPRES) tablet 0.1 mg, 0.1 mg, Oral, BID PRN, Kalen Belle MD    acyclovir (ZOVIRAX) capsule 400 mg, 400 mg, Oral, BID, Kalen Belle MD, 400 mg at 03/26/21 2147    pantoprazole (PROTONIX) tablet 40 mg, 40 mg, Oral, Daily, Kalen Belle MD    predniSONE (DELTASONE) tablet 2 mg, 2 mg, Oral, Daily, Kalen Belle MD    docusate sodium (COLACE) capsule 100 mg, 100 mg, Oral, BID, Kalen Belle MD, 100 mg at 03/26/21 2147    therapeutic multivitamin-minerals 1 tablet, 1 tablet, Oral, Daily, Kalen Belle MD    nitroGLYCERIN (NITROSTAT) SL tablet 0.4 mg, 0.4 mg, Sublingual, Q5 Min PRN, Kalen Belle MD    isosorbide mononitrate (IMDUR) extended release tablet 60 mg, 60 mg, Oral, BID, Alicia Vuong MD, 60 mg at 03/26/21 2147    carvedilol (COREG) tablet 25 mg, 25 mg, Oral, BID WC, Alicia Vuong MD, 25 mg at 03/26/21 1723    guaiFENesin [facility]) extended release tablet 1,200 mg, 1,200 mg, Oral, BID, Alicia Vuong MD, 1,200 mg at 03/26/21 2147    oxyCODONE-acetaminophen (PERCOCET)  MG per tablet 1 tablet, 1 tablet, Oral, Q4H PRN, Alicia Vuong MD, 1 tablet at 03/27/21 0605    bisacodyl (DULCOLAX) suppository 10 mg, 10 mg, Rectal, Daily PRN, Alicia Vuong MD    budesonide (PULMICORT) nebulizer suspension 500 mcg, 500 mcg, Nebulization, BID PRN, Alicia Vuong MD    cyclobenzaprine (FLEXERIL) tablet 10 mg, 10 mg, Oral, TID PRN, Alicia Vuong MD    gabapentin (NEURONTIN) capsule 100 mg, 100 mg, Oral, Q12H, Alicia Vuong MD, 100 mg at 03/26/21 2147    heparin (porcine) injection 5,000 Units, 5,000 Units, Subcutaneous, Q12H, Alicia Vuong MD, 5,000 Units at 03/26/21 2148    lidocaine 4 % external patch 1 patch, 1 patch, Transdermal, Daily, Alicia Vuong MD, 1 patch at 03/26/21 1724    magnesium hydroxide (MILK OF MAGNESIA) 400 MG/5ML suspension 30 mL, 30 mL, Oral, Daily PRN, Alicia Vuong MD    naloxegol (MOVANTIK) tablet 25 mg, 25 mg, Oral, QAM, Alicia Vuong MD    ondansetron [facility]) tablet 4 mg, 4 mg, Oral, Q6H PRN, Alicia Vuong MD, 4 mg at 03/27/21 0137    polyethylene glycol (GLYCOLAX) packet 17 g, 17 g, Oral, Daily, Alicia Vuong MD    sennosides-docusate sodium (SENOKOT-S) 8.6-50 MG tablet 2 tablet, 2 tablet, Oral, BID, Alicia Vuong MD    acetaminophen (TYLENOL) tablet 650 mg, 650 mg, Oral, Q4H PRN, Alicia Vuong MD    bisacodyl (DULCOLAX) suppository 10 mg, 10 mg, Rectal, Daily PRN, Alicia Vuong MD    ceFAZolin (ANCEF) 2,000 mg in sterile water 20 mL IV syringe, 2,000 mg, Intravenous, Q8H, Alicia Vuong MD, 2,000 mg at 03/27/21 0129    Allergies:  Amoxicillin, Lipitor, Niaspan [niacin er], Penicillins, Relafen [nabumetone], and Zocor [simvastatin]    Social History:   TOBACCO:   reports that she has never smoked. She has never used smokeless tobacco.  ETOH:   reports no history of alcohol use. Family History:       Problem Relation Age of Onset    Cancer Mother         LUNG CA    Heart Attack Father 39        FATAL MI           Physical Exam:    Vitals: BP (!) 167/80   Pulse 90   Temp 96.7 °F (35.9 °C) (Temporal)   Resp 18   Ht 5' 6\" (1.676 m)   Wt 197 lb 0.9 oz (89.4 kg)   SpO2 92%   BMI 31.81 kg/m²     Constitutional  well developed, well nourished. Eyes  conjunctiva normal.  Pupils react to light  Ear, nose, throat -hearing intact to finger rub No scars, masses, or lesions over external nose or ears, no atrophy of tongue  Neck-symmetric, no masses noted, no jugular vein distension  Respiration- chest wall appears symmetric, good expansion,   normal effort without use of accessory muscles  Cardiovascular- RRR without m,r,g  Musculoskeletal  no significant wasting of muscles noted, no bony deformities  Extremities-no clubbing, cyanosis or edema  Skin  warm, dry, and intact. No rash, erythema, or pallor. Psychiatric  mood, affect, and behavior appear normal.      Neurological exam  Awake, alert, fluent oriented x 3 appropriate affect  Attention and concentration appear appropriate  Recent and remote memory appears unremarkable  Speech normal without dysarthria  No clear issues with language of fund of knowledge    Cranial Nerve Exam   CN II- Visual fields grossly unremarkable  CN III, IV,VI-EOMI, No nystagmus, conjugate eye movements, no ptosis  CN VII-no facial assymetry  CN VIII-Hearing intact   CN IX and X- Palate elevates in midline  CN XI-good shoulder shrug  CN XII-Tongue midline with no fasciculations or fibrillations    Motor Exam  RA deformities noted in the hands. There is mild generalized weakness and right hip weakness which is partly antalgic.         Tremors- no tremors in hands or head noted    Gait Not tested    Coordination  Finger to nose-unremarkable        CBC:   Recent Labs     03/26/21  1518   WBC 6.2   HGB 10.4*          BMP:  No results for input(s): NA, K, CL, CO2, BUN, CREATININE, GLUCOSE in the last 72 hours. Hepatic: No results for input(s): AST, ALT, ALB, BILITOT, ALKPHOS in the last 72 hours. Lipids: No results for input(s): CHOL, HDL in the last 72 hours. Invalid input(s): LDLCALCU    INR: No results for input(s): INR in the last 72 hours. Assessment and Plan     1. Right psoas abscess and L1/2 discitis/osteomyelitis with generalized weakness and deconditioning. Continue Ancef for another 8 to 12 weeks most likely. PT/OT for generalized weakness and deconditioning  2. GIcontinue with medications for constipation  3. DVT prophylaxissubcu heparin  4. Rheumatoid arthritislow-dose prednisone  5. Coronary artery diseasecontinue Imdur  Will adjust pain medication and ask infectious disease to follow. Patient's functional assessment  Prior to hospitalization the patient was continent of bowel and bladder    Functional assessment  All notes from reehab data were reviewed regarding the patient's functional status.     Current therapy  Requires PT, OT and/or speech therapy    Anticipated discharge approximately 15 days    Anticipated discharge setting  Home with home care    No clear barriers to discharge    The patient appears to be an appropriate candidate for inpatient rehabilitation    Sufficiently stable: Patient's condition is sufficiently stable at the time of admission to allow the patient to actively participate in an intensive rehabilitation program    Close medical supervision: A rehabilitation physician, or other licensed treating physician with specialized training and experience in inpatient rehabilitation, will conduct face-to-face visits with the patient a minimum of at least 3 days per week throughout the patient's stay    This patient requires close medical supervision for the active management of the ongoing conditions and potential complications stated in the admission note    Intensive rehabilitation nursing: The patient demonstrates the need for 24-hour rehabilitation nursing care for active management of the multiple medical issues documented in the admission note    Appropriate therapy needed: The patient requires the active and ongoing therapeutic intervention of at least 2 therapeutic disciplines, one of which must be physical or occupational therapy and/or speech therapy    Intensive therapy: The patient requires and is reasonably expected to actively participate in at least 3 hours of therapy per day at least 5 days per week, and expected to make measurable improvements that will be of practical value to improve the patient's functional capacity or adaptation to impairments. In addition, therapy treatments will begin within 36 hours from midnight of the day of the patient's admission to the inpatient rehabilitation facility    Expected duration and frequency therapy: 180 minutes per day, 5 days per week    Interdisciplinary team: The patient demonstrates the need for an interdisciplinary team for active management of the following medical issues including ataxia, motor planning, balance, disease management, elimination, endurance, family training, education, independent ADLs, pain management, precautions, range of motion, safety, strength, and transfers    I have reviewed the preadmission screening documents and concur with the findings. I believe the patient meets criteria and is sufficiently stable to allow participation in the program. This requires an intensive level of therapy, close medical supervision, and an interdisciplinary team approach provided through an individualized plan of care. I approve admitting this patient for an intensive inpatient rehabilitation program.      Amarilis Fountain.  Deshawn Olivares MD

## 2021-03-27 NOTE — PLAN OF CARE
Ongoing  3/27/2021 0307 by Marcela Booker LPN  Outcome: Ongoing  Goal: LTG - patient will utilize safety techniques  3/27/2021 1541 by Ashwini Benavides RN  Outcome: Ongoing  3/27/2021 0308 by Marcela Booker LPN  Outcome: Ongoing  3/27/2021 0307 by Marcela Booker LPN  Outcome: Ongoing  Goal: STG - patient locks brakes on wheelchair  3/27/2021 1541 by Ashwini Benavides RN  Outcome: Ongoing  3/27/2021 0308 by Marcela Booker, LPN  Outcome: Ongoing  3/27/2021 0307 by Marcela Booker LPN  Outcome: Ongoing  Goal: STG - Patient uses call light consistently to request assistance with transfers  3/27/2021 1541 by Ashwini Benavides RN  Outcome: Ongoing  3/27/2021 0308 by Marcela Booker LPN  Outcome: Ongoing  3/27/2021 0307 by Marcela Booker LPN  Outcome: Ongoing  Goal: STG - patient uses gait belt during all transfers  3/27/2021 1541 by Ashwini Benavides RN  Outcome: Ongoing  3/27/2021 0308 by Marclea Booker LPN  Outcome: Ongoing  3/27/2021 0307 by Marcela Booker LPN  Outcome: Ongoing     Problem: DAILY CARE  Goal: Daily care needs are met  3/27/2021 1541 by Ashwini Benavides RN  Outcome: Ongoing  3/27/2021 0308 by Marcela Booker LPN  Outcome: Ongoing  3/27/2021 0307 by Marcela Booker LPN  Outcome: Ongoing     Problem: PAIN  Goal: Patient's pain/discomfort is manageable  3/27/2021 1541 by Ashwini Benavides RN  Outcome: Ongoing  3/27/2021 0308 by Marcela Booker LPN  Outcome: Ongoing  3/27/2021 0307 by Marcela Booker LPN  Outcome: Ongoing  Goal: STG - pain is manageable through therapies  3/27/2021 1541 by Ashwini Benavides RN  Outcome: Ongoing  3/27/2021 0308 by Marcela Booker LPN  Outcome: Ongoing  3/27/2021 0307 by Marcela Booker LPN  Outcome: Ongoing  Goal: STG - Patient will verbalize an acceptable level of pain  3/27/2021 1541 by Ashwini Benavides RN  Outcome: Ongoing  3/27/2021 0308 by Marcela Booker LPN  Outcome: Ongoing  3/27/2021 0307 by Marcela Booker LPN Outcome: Ongoing  Goal: STG - patients pain is managed to allow active participation in daily activities  3/27/2021 1541 by Bj Lay RN  Outcome: Ongoing  3/27/2021 0308 by Denise Wiley, LPN  Outcome: Ongoing  3/27/2021 0307 by Denise Alpha, LPN  Outcome: Ongoing     Problem: SKIN INTEGRITY  Goal: Skin integrity is maintained or improved  3/27/2021 1541 by Bj Lay RN  Outcome: Ongoing  3/27/2021 0308 by Denise Wiley, LPN  Outcome: Ongoing  3/27/2021 0307 by Denise Wiley, LPN  Outcome: Ongoing  Goal: LTG - Patient will be free from infection  3/27/2021 1541 by Bj Lay RN  Outcome: Ongoing  3/27/2021 0308 by Denise Wiley, LPN  Outcome: Ongoing  3/27/2021 0307 by Denise Wiley, LPN  Outcome: Ongoing  Goal: STG - Patient demonstrates skin care/treatment/dressing change  3/27/2021 1541 by Bj Lay RN  Outcome: Ongoing  3/27/2021 0308 by Denise Jama, LPN  Outcome: Ongoing  3/27/2021 0307 by Denise Wiley, LPN  Outcome: Ongoing  Goal: STG - patient will maintain good skin integrity  3/27/2021 1541 by Bj Lay RN  Outcome: Ongoing  3/27/2021 0308 by Denise Wiley, LPN  Outcome: Ongoing  3/27/2021 0307 by Denise Wiley, LPN  Outcome: Ongoing     Problem: KNOWLEDGE DEFICIT  Goal: Patient/S.O. demonstrates understanding of disease process, treatment plan, medications, and discharge instructions.   3/27/2021 1541 by Bj Lay RN  Outcome: Ongoing  3/27/2021 0308 by Denise Wliey, LPN  Outcome: Ongoing  3/27/2021 0307 by Denise Wiley, LPN  Outcome: Ongoing     Problem: DISCHARGE BARRIERS  Goal: Patient's continuum of care needs are met  3/27/2021 1541 by Bj Lay RN  Outcome: Ongoing  3/27/2021 0308 by Denise Wiley, LPN  Outcome: Ongoing  3/27/2021 0307 by Denise Jama LPN  Outcome: Ongoing     Problem: IP BLADDER/VOIDING  Goal: LTG - Patient will utilize adaptive techniques/equipment to complete bladder elimination  3/27/2021 1541 by Alexia Cruz RN  Outcome: Ongoing  3/27/2021 0308 by Apurva Melgar LPN  Outcome: Ongoing  3/27/2021 0307 by Apurva Melgar LPN  Outcome: Ongoing  Goal: STG - Patient demonstrates no accidents  3/27/2021 1541 by Alexia Cruz RN  Outcome: Ongoing  3/27/2021 0308 by Apurva Melgar LPN  Outcome: Ongoing  3/27/2021 0307 by Auprva Melgar LPN  Outcome: Ongoing  Goal: STG - patient participates in bladder program by expressing need to void  3/27/2021 1541 by Alexia Cruz RN  Outcome: Ongoing  3/27/2021 0308 by Apurva Melgar LPN  Outcome: Ongoing  3/27/2021 0307 by Apurva Melgar LPN  Outcome: Ongoing     Problem: IP BOWEL ELIMINATION  Goal: LTG - patient will have regular and routine bowel evacuation  3/27/2021 1541 by Alexia Cruz RN  Outcome: Ongoing  3/27/2021 0308 by Apurva Melgar LPN  Outcome: Ongoing  3/27/2021 0307 by Apurva Melgar LPN  Outcome: Ongoing     Problem: IP BREATHING  Goal: STG - respiratory rate and effort will be within normal limits for the patient  3/27/2021 1541 by Alexia Cruz RN  Outcome: Ongoing  3/27/2021 0308 by Apurva Melgar LPN  Outcome: Ongoing  3/27/2021 0307 by Apurva Melgar LPN  Outcome: Ongoing     Problem: NUTRITION  Goal: Patient/Family demonstrates understanding of diet  3/27/2021 1541 by Alexia Cruz RN  Outcome: Ongoing  3/27/2021 0308 by Apurva Melgar LPN  Outcome: Ongoing  3/27/2021 0307 by Apurva Melgar LPN  Outcome: Ongoing     Problem: Mobility - Impaired:  Goal: Mobility will improve  Description: Mobility will improve  3/27/2021 1541 by Alexia Cruz RN  Outcome: Ongoing  3/27/2021 0308 by Apurva Melgar LPN  Outcome: Ongoing  3/27/2021 0307 by Apurva Melgar LPN  Outcome: Ongoing     Problem:  Activity:  Goal: Ability to tolerate increased activity will improve  Description: Ability to tolerate increased activity will improve  3/27/2021 1541 by Son Iqbal Jose Farfan RN  Outcome: Ongoing  3/27/2021 0308 by Apurva Melgar LPN  Outcome: Ongoing  3/27/2021 0307 by Apurva Melgar LPN  Outcome: Ongoing     Problem: Nutrition  Goal: Optimal nutrition therapy  3/27/2021 1541 by Alexia Cruz RN  Outcome: Ongoing  3/27/2021 1142 by Hemant Jacob RD, LD  Outcome: Ongoing

## 2021-03-27 NOTE — PROGRESS NOTES
Discharge Goal: Independent     Toileting Hygiene  Assistance Needed: Substantial/maximal assistance  CARE Score: 2  Discharge Goal: Independent     Shower/Bathe Self  Assistance Needed: Partial/moderate assistance  CARE Score: 3  Discharge Goal: Independent     Upper Body Dressing  Assistance Needed: Partial/moderate assistance  CARE Score: 3  Discharge Goal: Independent     Lower Body Dressing  Assistance Needed: Substantial/maximal assistance  CARE Score: 2  Discharge Goal: Independent     Putting On/Taking Off Footwear  Assistance Needed: Substantial/maximal assistance  CARE Score: 2  Discharge Goal: Independent     Toilet Transfer  Assistance Needed: Partial/moderate assistance  CARE Score: 3  Discharge Goal: Independent     Picking Up Object  Reason if not Attempted: Not attempted due to medical condition or safety concerns  CARE Score: 88  Discharge Goal: Partial/moderate assistance         Plan   Plan  Current Treatment Recommendations: Self-Care / ADL, Safety Education & Training, Pain Management, Endurance Training, Functional Mobility Training, Strengthening, Patient/Caregiver Education & Training, Home Management Training, Equipment Evaluation, Education, & procurement, Balance Training, Positioning         Goals  Short term goals  Time Frame for Short term goals: 1 week  Short term goal 1: pt wll complete UB dressing with min A  Short term goal 2: pt will complete LB dressing with  min A  Short term goal 3: pt will complete overall bathing with min A  Short term goal 4: pt will complete simple ambulatory home making task with min A  Short term goal 5: pt will don/doff back brace with independence  Short term goal 6: pt will complete 1-2 handed standing task for 3 mins with CGA  Short term goal 7: pt will complete R FM/sensory acts to improve function of hand during ADL task  Long term goals  Time Frame for Long term goals : 2 weeks  Long term goal 1: complete overall dressing with supervision  Long term goal 2: complete overall bathing with supervision  Long term goal 3: complete overall toileting with supervision  Long term goal 4: complete simple ambulatory home making task with supervision  Long term goal 5: complete HEP with independence       Therapy Time   Individual Concurrent Group Co-treatment   Time In 1000         Time Out 1100         Minutes 60         Timed Code Treatment Minutes: 45 Minutes       Electronically signed by Rashaun Odonnell OT on 3/27/2021 at 5:26 PM

## 2021-03-27 NOTE — PROGRESS NOTES
Minimal assistance  Quality of Gait: RLE EXT ROTATED  Distance: 10  Comments: DECREASED STANCE PHASE RLE  STAIRS   NOT ASSESSED    GOALS:  Short term goals  Time Frame for Short term goals: 1 WEEK  Short term goal 1: CGA FOR ON BED MOBILITY  Short term goal 2: CGA FOR TRANSFERS  Short term goal 3: CGA AMB 50 FEET WITH AD  Short term goal 4: INDEP W/C PROPEL 50 FEET  Short term goal 5: CGA CAR TRANSFER    Long term goals  Time Frame for Long term goals : 2 WEEKS  Long term goal 1: INDEP ON BED AND TRANSFERS  Long term goal 2: INDPE  FEET WITH AD  Long term goal 3: INDPE W/C PROPEL 150 FEET  Long term goal 4: INDEP CAR TRANSFER  Long term goal 5: INDEP 4 STEP   HOME LIVING:                    ASSESSMENT (IMPAIRMENTS/BARRIERS): Body structures, Functions, Activity limitations: Decreased functional mobility , Decreased ROM, Decreased strength, Decreased endurance, Decreased balance, Decreased fine motor control, Decreased coordination, Increased pain  Assessment: DECREASED STRENGTH AND ROM LLE, DECREASED BALANCE AND ENDURANCE, PHYSICAL ASSISIT REQURED FOR ON BED MOBILITY, STEANDYING ASSIT REQUIRED FOR TRNASFERS AND AMBULATION, INCREASD PAIN, STEPS AND CAR TRANSFER NOT ATTEMPTED.    Activity Tolerance: Patient limited by pain     PLAN:  Plan  Times per week: 5 TO 6  Times per day: Twice a day  Plan weeks: 2 WEEKS  Current Treatment Recommendations: Strengthening, ROM, Balance Training, Functional Mobility Training, Transfer Training, Endurance Training, Wheelchair Mobility Training, Gait Training, Stair training, Safety Education & Training, Patient/Caregiver Education & Training, Home Exercise Program, Equipment Evaluation, Education, & procurement  Discharge Recommendations: Home with Home health PT  PATIENT REQUIRES AND IS REASONABLY EXPECTED TO ACTIVELY PARTICIPATE IN AT LEAST 3 HOURS OF INTENSIVE THERAPY PER DAY AT LEAST 5 DAYS PER WEEK, AND BE EXPECTED TO MAKE MEASURABLE IMPROVEMENT THAT WILL BE OF PRACTICAL VALUE TO IMPROVE THE PATIENT'S FUNCTIONAL CAPACITY OR ADAPTATION TO IMPAIRMENTS.    PATIENT GOAL FOR REHAB:  RETURN TO PRIOR LEVEL OF FUNCTION       IRF/CAT  Roll Left and Right  Assistance Needed: Supervision or touching assistance  CARE Score: 4  Discharge Goal: Independent    Sit to Lying  Assistance Needed: Partial/moderate assistance  CARE Score: 3  Discharge Goal: Independent    Lying to Sitting on Side of Bed  Assistance Needed: Partial/moderate assistance  CARE Score: 3  Discharge Goal: Independent    Sit to Stand  Assistance Needed: Partial/moderate assistance  CARE Score: 3  Discharge Goal: Independent    Chair/Bed-to-Chair Transfer  Assistance Needed: Partial/moderate assistance  CARE Score: 3  Discharge Goal: Independent    Car Transfer  Reason if not Attempted: Not attempted due to medical condition or safety concerns  CARE Score: 88  Discharge Goal: Independent    Walk 10 Feet  Assistance Needed: Partial/moderate assistance  CARE Score: 3  Discharge Goal: Independent    Walk 50 Feet with Two Turns  Reason if not Attempted: Not attempted due to medical condition or safety concerns  CARE Score: 88  Discharge Goal: Independent    Walk 150 Feet  Reason if not Attempted: Not attempted due to medical condition or safety concerns  CARE Score: 88  Discharge Goal: Independent    Walking 10 Feet on Uneven Surfaces  Reason if not Attempted: Not attempted due to medical condition or safety concerns  CARE Score: 88  Discharge Goal: Not Attempted    1 Step (Curb)  Reason if not Attempted: Not attempted due to medical condition or safety concerns  CARE Score: 88  Discharge Goal: Independent    4 Steps  Reason if not Attempted: Not attempted due to medical condition or safety concerns  CARE Score: 88  Discharge Goal: Independent    12 Steps  Reason if not Attempted: Not attempted due to medical condition or safety concerns  CARE Score: 88  Discharge Goal: Supervision or touching assistance    Wheel 50 Feet with Two Turns  Assistance Needed: Supervision or touching assistance  CARE Score: 4  Discharge Goal: Independent    Wheel 150 Feet  Reason if not Attempted: Not attempted due to medical condition or safety concerns  CARE Score: 88  Discharge Goal: Independent      LAST TREATMENT TIME  PT Individual Minutes  Time In: 0900  Time Out: 1000  Minutes: 60      Electronically signed by Crescencio Bravo PT on 3/27/21 at 4:11 PM CDT

## 2021-03-28 LAB — BACTERIA SPEC ANAEROBE CULT: NORMAL

## 2021-03-28 PROCEDURE — 2580000003 HC RX 258: Performed by: PSYCHIATRY & NEUROLOGY

## 2021-03-28 PROCEDURE — 6360000002 HC RX W HCPCS: Performed by: PSYCHIATRY & NEUROLOGY

## 2021-03-28 PROCEDURE — 6370000000 HC RX 637 (ALT 250 FOR IP): Performed by: PSYCHIATRY & NEUROLOGY

## 2021-03-28 PROCEDURE — 1180000000 HC REHAB R&B

## 2021-03-28 RX ORDER — SODIUM CHLORIDE 0.9 % (FLUSH) 0.9 %
10 SYRINGE (ML) INJECTION 2 TIMES DAILY
Status: DISCONTINUED | OUTPATIENT
Start: 2021-03-28 | End: 2021-04-12 | Stop reason: HOSPADM

## 2021-03-28 RX ORDER — SODIUM CHLORIDE 0.9 % (FLUSH) 0.9 %
10 SYRINGE (ML) INJECTION 2 TIMES DAILY
Status: DISCONTINUED | OUTPATIENT
Start: 2021-03-28 | End: 2021-03-28

## 2021-03-28 RX ORDER — SODIUM CHLORIDE 0.9 % (FLUSH) 0.9 %
10 SYRINGE (ML) INJECTION PRN
Status: DISCONTINUED | OUTPATIENT
Start: 2021-03-28 | End: 2021-04-12 | Stop reason: HOSPADM

## 2021-03-28 RX ADMIN — WATER 2000 MG: 1 INJECTION INTRAMUSCULAR; INTRAVENOUS; SUBCUTANEOUS at 00:55

## 2021-03-28 RX ADMIN — ISOSORBIDE MONONITRATE 60 MG: 60 TABLET, EXTENDED RELEASE ORAL at 21:26

## 2021-03-28 RX ADMIN — OXYCODONE 15 MG: 5 TABLET ORAL at 08:47

## 2021-03-28 RX ADMIN — GUAIFENESIN 1200 MG: 600 TABLET, EXTENDED RELEASE ORAL at 08:46

## 2021-03-28 RX ADMIN — ACETAMINOPHEN 650 MG: 325 TABLET ORAL at 06:49

## 2021-03-28 RX ADMIN — NALOXEGOL OXALATE 25 MG: 12.5 TABLET, FILM COATED ORAL at 08:48

## 2021-03-28 RX ADMIN — SODIUM CHLORIDE, PRESERVATIVE FREE 10 ML: 5 INJECTION INTRAVENOUS at 21:26

## 2021-03-28 RX ADMIN — GABAPENTIN 100 MG: 100 CAPSULE ORAL at 08:47

## 2021-03-28 RX ADMIN — CARVEDILOL 25 MG: 25 TABLET, FILM COATED ORAL at 17:00

## 2021-03-28 RX ADMIN — GUAIFENESIN 1200 MG: 600 TABLET, EXTENDED RELEASE ORAL at 21:26

## 2021-03-28 RX ADMIN — POLYETHYLENE GLYCOL 3350 17 G: 17 POWDER, FOR SOLUTION ORAL at 08:45

## 2021-03-28 RX ADMIN — PANTOPRAZOLE SODIUM 40 MG: 40 TABLET, DELAYED RELEASE ORAL at 08:46

## 2021-03-28 RX ADMIN — HEPARIN SODIUM 5000 UNITS: 5000 INJECTION INTRAVENOUS; SUBCUTANEOUS at 08:46

## 2021-03-28 RX ADMIN — OXYCODONE 15 MG: 5 TABLET ORAL at 21:27

## 2021-03-28 RX ADMIN — WATER 2000 MG: 1 INJECTION INTRAMUSCULAR; INTRAVENOUS; SUBCUTANEOUS at 19:39

## 2021-03-28 RX ADMIN — CARVEDILOL 25 MG: 25 TABLET, FILM COATED ORAL at 08:48

## 2021-03-28 RX ADMIN — ISOSORBIDE MONONITRATE 60 MG: 60 TABLET, EXTENDED RELEASE ORAL at 08:46

## 2021-03-28 RX ADMIN — HEPARIN SODIUM 5000 UNITS: 5000 INJECTION INTRAVENOUS; SUBCUTANEOUS at 21:28

## 2021-03-28 RX ADMIN — Medication 1 TABLET: at 08:46

## 2021-03-28 RX ADMIN — OXYCODONE 15 MG: 5 TABLET ORAL at 17:00

## 2021-03-28 RX ADMIN — SODIUM CHLORIDE, PRESERVATIVE FREE 10 ML: 5 INJECTION INTRAVENOUS at 09:01

## 2021-03-28 RX ADMIN — ACYCLOVIR 400 MG: 200 CAPSULE ORAL at 21:26

## 2021-03-28 RX ADMIN — DOCUSATE SODIUM 50 MG AND SENNOSIDES 8.6 MG 2 TABLET: 8.6; 5 TABLET, FILM COATED ORAL at 17:00

## 2021-03-28 RX ADMIN — DOCUSATE SODIUM 100 MG: 100 CAPSULE, LIQUID FILLED ORAL at 08:47

## 2021-03-28 RX ADMIN — LEVOTHYROXINE SODIUM 75 MCG: 75 TABLET ORAL at 05:58

## 2021-03-28 RX ADMIN — WATER 2000 MG: 1 INJECTION INTRAMUSCULAR; INTRAVENOUS; SUBCUTANEOUS at 11:02

## 2021-03-28 RX ADMIN — ACYCLOVIR 400 MG: 200 CAPSULE ORAL at 08:48

## 2021-03-28 RX ADMIN — DOCUSATE SODIUM 50 MG AND SENNOSIDES 8.6 MG 2 TABLET: 8.6; 5 TABLET, FILM COATED ORAL at 08:46

## 2021-03-28 RX ADMIN — GABAPENTIN 100 MG: 100 CAPSULE ORAL at 21:27

## 2021-03-28 RX ADMIN — PREDNISONE 2 MG: 1 TABLET ORAL at 08:47

## 2021-03-28 RX ADMIN — OXYCODONE 15 MG: 5 TABLET ORAL at 12:42

## 2021-03-28 ASSESSMENT — PAIN - FUNCTIONAL ASSESSMENT: PAIN_FUNCTIONAL_ASSESSMENT: PREVENTS OR INTERFERES SOME ACTIVE ACTIVITIES AND ADLS

## 2021-03-28 ASSESSMENT — PAIN SCALES - GENERAL
PAINLEVEL_OUTOF10: 7
PAINLEVEL_OUTOF10: 3
PAINLEVEL_OUTOF10: 8
PAINLEVEL_OUTOF10: 5

## 2021-03-28 ASSESSMENT — PAIN DESCRIPTION - PAIN TYPE
TYPE: ACUTE PAIN

## 2021-03-28 ASSESSMENT — PAIN DESCRIPTION - LOCATION
LOCATION: GENERALIZED
LOCATION: BACK
LOCATION: HIP
LOCATION: BACK

## 2021-03-28 ASSESSMENT — PAIN DESCRIPTION - DESCRIPTORS
DESCRIPTORS: ACHING;DISCOMFORT;THROBBING
DESCRIPTORS: ACHING;DISCOMFORT
DESCRIPTORS: ACHING;DISCOMFORT
DESCRIPTORS: ACHING;CRAMPING
DESCRIPTORS: ACHING;DISCOMFORT

## 2021-03-28 ASSESSMENT — PAIN DESCRIPTION - ORIENTATION
ORIENTATION: LOWER;MID
ORIENTATION: RIGHT

## 2021-03-28 ASSESSMENT — PAIN DESCRIPTION - FREQUENCY: FREQUENCY: INTERMITTENT

## 2021-03-28 ASSESSMENT — PAIN DESCRIPTION - PROGRESSION: CLINICAL_PROGRESSION: NOT CHANGED

## 2021-03-28 NOTE — PROGRESS NOTES
(ZOFRAN) tablet 4 mg, Q6H PRN  polyethylene glycol (GLYCOLAX) packet 17 g, Daily  sennosides-docusate sodium (SENOKOT-S) 8.6-50 MG tablet 2 tablet, BID  acetaminophen (TYLENOL) tablet 650 mg, Q4H PRN  bisacodyl (DULCOLAX) suppository 10 mg, Daily PRN  ceFAZolin (ANCEF) 2,000 mg in sterile water 20 mL IV syringe, Q8H      Review of Systems see HPI    VitalSigns:  BP (!) 159/83   Pulse 91   Temp 97.2 °F (36.2 °C) (Temporal)   Resp 18   Ht 5' 6\" (1.676 m)   Wt 197 lb 0.9 oz (89.4 kg)   SpO2 90%   BMI 31.81 kg/m²      Physical Exam  Line/IV (PICC line) site: No erythema, warmth, induration, or tenderness.   Right hip without cellulitis  Abdomen soft nontender    Lab Results:  CBC:   Recent Labs     03/26/21  1518   WBC 6.2   HGB 10.4*        Radiology: None    Additional Studies Reviewed:  None    Impression:  Stable from ID standpoint  Under treatment for MSSA bacteremia, right psoas abscess, discitis/osteomyelitis    Recommendations:  Continue cefazolin  Continue physical therapy  Suggest dressing changes twice daily with saline moist Kerlix pack followed by 4 x 4 or ABD pad and Medipore tape  Continue to follow    Noam Gallagher MD

## 2021-03-28 NOTE — PLAN OF CARE
by Geoff Au LPN  Outcome: Ongoing  3/27/2021 1541 by Jazzmine Freeman RN  Outcome: Ongoing  Goal: STG - patient uses gait belt during all transfers  3/28/2021 0032 by Geoff Au LPN  Outcome: Ongoing  3/27/2021 1541 by Jazzmine Freeman RN  Outcome: Ongoing     Problem: DAILY CARE  Goal: Daily care needs are met  3/28/2021 0032 by Geoff Au LPN  Outcome: Ongoing  3/27/2021 1541 by Jazzmine Freeman RN  Outcome: Ongoing     Problem: PAIN  Goal: Patient's pain/discomfort is manageable  3/28/2021 0032 by Geoff Au LPN  Outcome: Ongoing  3/27/2021 1541 by Jazzmine Freeman RN  Outcome: Ongoing  Goal: STG - pain is manageable through therapies  3/28/2021 0032 by Geoff Au LPN  Outcome: Ongoing  3/27/2021 1541 by Jazzmine Freeman RN  Outcome: Ongoing  Goal: STG - Patient will verbalize an acceptable level of pain  3/28/2021 0032 by Geoff Au LPN  Outcome: Ongoing  3/27/2021 1541 by Jazzmine Freeman RN  Outcome: Ongoing  Goal: STG - patients pain is managed to allow active participation in daily activities  3/28/2021 0032 by Geoff Au LPN  Outcome: Ongoing  3/27/2021 1541 by Jazzmine Freeman RN  Outcome: Ongoing     Problem: SKIN INTEGRITY  Goal: Skin integrity is maintained or improved  3/28/2021 0032 by Geoff Au LPN  Outcome: Ongoing  3/27/2021 1541 by Jazzmine Freeman RN  Outcome: Ongoing  Goal: LTG - Patient will be free from infection  3/28/2021 0032 by Geoff Au LPN  Outcome: Ongoing  3/27/2021 1541 by Jazzmine Freeman RN  Outcome: Ongoing  Goal: STG - Patient demonstrates skin care/treatment/dressing change  3/28/2021 0032 by Geoff Au LPN  Outcome: Ongoing  3/27/2021 1541 by Jazzmine Freeman RN  Outcome: Ongoing  Goal: STG - patient will maintain good skin integrity  3/28/2021 0032 by Geoff Au LPN  Outcome: Ongoing  3/27/2021 1541 by Jazzmine Freeman RN  Outcome: Ongoing     Problem: KNOWLEDGE DEFICIT  Goal: Patient/S.O. demonstrates understanding of disease process, treatment plan, medications, and discharge instructions. 3/28/2021 0032 by Corine Ortega LPN  Outcome: Ongoing  3/27/2021 1541 by Nancy Powell RN  Outcome: Ongoing     Problem: DISCHARGE BARRIERS  Goal: Patient's continuum of care needs are met  3/28/2021 0032 by Corine Ortega LPN  Outcome: Ongoing  3/27/2021 1541 by Nancy Powell RN  Outcome: Ongoing     Problem: IP BLADDER/VOIDING  Goal: LTG - Patient will utilize adaptive techniques/equipment to complete bladder elimination  3/28/2021 0032 by Corine Ortega LPN  Outcome: Ongoing  3/27/2021 1541 by Nancy Powell RN  Outcome: Ongoing  Goal: STG - Patient demonstrates no accidents  3/28/2021 0032 by Corine Ortega LPN  Outcome: Ongoing  3/27/2021 1541 by Nancy Powell RN  Outcome: Ongoing  Goal: STG - patient participates in bladder program by expressing need to void  3/28/2021 0032 by Corine Ortega LPN  Outcome: Ongoing  3/27/2021 1541 by Nancy oPwell RN  Outcome: Ongoing     Problem: IP BOWEL ELIMINATION  Goal: LTG - patient will have regular and routine bowel evacuation  3/28/2021 0032 by Corine Ortega LPN  Outcome: Ongoing  3/27/2021 1541 by Nancy Powell RN  Outcome: Ongoing     Problem: IP BREATHING  Goal: STG - respiratory rate and effort will be within normal limits for the patient  3/28/2021 0032 by Corine Ortega LPN  Outcome: Ongoing  3/27/2021 1541 by Nancy Powell RN  Outcome: Ongoing     Problem: NUTRITION  Goal: Patient/Family demonstrates understanding of diet  3/28/2021 0032 by Corine Ortega LPN  Outcome: Ongoing  3/27/2021 1541 by Nancy Powell RN  Outcome: Ongoing     Problem: Mobility - Impaired:  Goal: Mobility will improve  Description: Mobility will improve  3/28/2021 0032 by Corine Ortega LPN  Outcome: Ongoing  3/27/2021 1541 by Nancy Powell RN  Outcome: Ongoing     Problem:  Activity:  Goal: Ability to tolerate increased activity will improve

## 2021-03-28 NOTE — PLAN OF CARE
Problem: Falls - Risk of:  Goal: Will remain free from falls  Description: Will remain free from falls  3/28/2021 1003 by Claude Cozier, LPN  Outcome: Ongoing  3/28/2021 0032 by Levcinda Call LPN  Outcome: Ongoing  Goal: Absence of physical injury  Description: Absence of physical injury  3/28/2021 1003 by Claude Cozier, LPN  Outcome: Ongoing  3/28/2021 0032 by Levcinda Call LPN  Outcome: Ongoing     Problem: Pain:  Goal: Pain level will decrease  Description: Pain level will decrease  3/28/2021 1003 by Claude Cozier, LPN  Outcome: Ongoing  3/28/2021 0032 by Levcinda Call LPN  Outcome: Ongoing  Goal: Control of acute pain  Description: Control of acute pain  3/28/2021 1003 by Claude Cozier, LPN  Outcome: Ongoing  3/28/2021 0032 by Jacqueline Call LPN  Outcome: Ongoing  Goal: Control of chronic pain  Description: Control of chronic pain  3/28/2021 1003 by Claude Cozier, LPN  Outcome: Ongoing  3/28/2021 0032 by Jacqueline Call LPN  Outcome: Ongoing     Problem: SAFETY  Goal: Free from accidental physical injury  3/28/2021 1003 by Claude Cozier, LPN  Outcome: Ongoing  3/28/2021 0032 by Levcinda Call LPN  Outcome: Ongoing  Goal: Free from intentional harm  3/28/2021 1003 by Claude Cozier, LPN  Outcome: Ongoing  3/28/2021 0032 by Levcinda Call LPN  Outcome: Ongoing  Goal: LTG - Patient will demonstrate safety requirements appropriate to situation/environment  3/28/2021 1003 by Claude Cozier, LPN  Outcome: Ongoing  3/28/2021 0032 by Jacqueline Call LPN  Outcome: Ongoing  Goal: LTG - patient will utilize safety techniques  3/28/2021 1003 by Claude Cozier, LPN  Outcome: Ongoing  3/28/2021 0032 by Levcinda Call LPN  Outcome: Ongoing  Goal: STG - patient locks brakes on wheelchair  3/28/2021 1003 by Claude Cozier, LPN  Outcome: Ongoing  3/28/2021 0032 by Levester Levo, LPN  Outcome: Ongoing  Goal: STG - Patient uses call light consistently to request assistance with transfers  3/28/2021 1003 by Valerie Will LPN  Outcome: Ongoing  3/28/2021 0032 by Kirstin Recinos LPN  Outcome: Ongoing  Goal: STG - patient uses gait belt during all transfers  3/28/2021 1003 by Valerie Will LPN  Outcome: Ongoing  3/28/2021 0032 by Kirtsin Recinos LPN  Outcome: Ongoing     Problem: DAILY CARE  Goal: Daily care needs are met  3/28/2021 1003 by Valerie Will LPN  Outcome: Ongoing  3/28/2021 0032 by Kirstin Recinos LPN  Outcome: Ongoing     Problem: PAIN  Goal: Patient's pain/discomfort is manageable  3/28/2021 1003 by Valerie Will LPN  Outcome: Ongoing  3/28/2021 0032 by Kirstin Recinos LPN  Outcome: Ongoing  Goal: STG - pain is manageable through therapies  3/28/2021 1003 by Valerie Will LPN  Outcome: Ongoing  3/28/2021 0032 by Kirstin Recinos LPN  Outcome: Ongoing  Goal: STG - Patient will verbalize an acceptable level of pain  3/28/2021 1003 by Valerie Will LPN  Outcome: Ongoing  3/28/2021 0032 by Kirstin Recinos LPN  Outcome: Ongoing  Goal: STG - patients pain is managed to allow active participation in daily activities  3/28/2021 1003 by Valerie Will LPN  Outcome: Ongoing  3/28/2021 0032 by Kirstin Recinos LPN  Outcome: Ongoing     Problem: SKIN INTEGRITY  Goal: Skin integrity is maintained or improved  3/28/2021 1003 by Valerie Will LPN  Outcome: Ongoing  3/28/2021 0032 by Kirstin Recinos LPN  Outcome: Ongoing  Goal: LTG - Patient will be free from infection  3/28/2021 1003 by Valerie Will LPN  Outcome: Ongoing  3/28/2021 0032 by Kirstin Recinos LPN  Outcome: Ongoing  Goal: STG - Patient demonstrates skin care/treatment/dressing change  3/28/2021 1003 by Valerie Will LPN  Outcome: Ongoing  3/28/2021 0032 by Kirstin Recinos LPN  Outcome: Ongoing  Goal: STG - patient will maintain good skin integrity  3/28/2021 1003 by Valerie Will LPN  Outcome: Ongoing  3/28/2021 0032 by Kirstin Recinos LPN  Outcome: Ongoing Problem: KNOWLEDGE DEFICIT  Goal: Patient/S.O. demonstrates understanding of disease process, treatment plan, medications, and discharge instructions.   3/28/2021 1003 by Smitha Carter LPN  Outcome: Ongoing  3/28/2021 0032 by Mor Kee LPN  Outcome: Ongoing     Problem: DISCHARGE BARRIERS  Goal: Patient's continuum of care needs are met  3/28/2021 1003 by Smitha Carter LPN  Outcome: Ongoing  3/28/2021 0032 by Mor Kee LPN  Outcome: Ongoing     Problem: IP BLADDER/VOIDING  Goal: LTG - Patient will utilize adaptive techniques/equipment to complete bladder elimination  3/28/2021 1003 by Smitha Carter LPN  Outcome: Ongoing  3/28/2021 0032 by Mor Kee LPN  Outcome: Ongoing  Goal: STG - Patient demonstrates no accidents  3/28/2021 1003 by Smitha Carter LPN  Outcome: Ongoing  3/28/2021 0032 by Mor Kee LPN  Outcome: Ongoing  Goal: STG - patient participates in bladder program by expressing need to void  3/28/2021 1003 by Smitha Carter LPN  Outcome: Ongoing  3/28/2021 0032 by Mor Kee LPN  Outcome: Ongoing     Problem: IP BOWEL ELIMINATION  Goal: LTG - patient will have regular and routine bowel evacuation  3/28/2021 1003 by Smitha Carter LPN  Outcome: Ongoing  3/28/2021 0032 by Mor Kee LPN  Outcome: Ongoing     Problem: IP BREATHING  Goal: STG - respiratory rate and effort will be within normal limits for the patient  3/28/2021 1003 by Smitha Carter LPN  Outcome: Ongoing  3/28/2021 0032 by Mor Kee LPN  Outcome: Ongoing     Problem: NUTRITION  Goal: Patient/Family demonstrates understanding of diet  3/28/2021 1003 by Smitha Carter LPN  Outcome: Ongoing  3/28/2021 0032 by Mor Kee LPN  Outcome: Ongoing     Problem: Mobility - Impaired:  Goal: Mobility will improve  Description: Mobility will improve  3/28/2021 1003 by Smitha Carter LPN  Outcome: Ongoing  3/28/2021 0032 by Mor Kee LPN  Outcome: Ongoing Problem:  Activity:  Goal: Ability to tolerate increased activity will improve  Description: Ability to tolerate increased activity will improve  3/28/2021 1003 by Vanessa David LPN  Outcome: Ongoing  3/28/2021 0032 by Ralph Rocha LPN  Outcome: Ongoing     Problem: Bleeding:  Goal: Will show no signs and symptoms of excessive bleeding  Description: Will show no signs and symptoms of excessive bleeding  3/28/2021 1003 by Vanessa David LPN  Outcome: Ongoing  3/28/2021 0032 by Ralph Rocha LPN  Outcome: Ongoing     Problem: Skin Integrity:  Goal: Will show no infection signs and symptoms  Description: Will show no infection signs and symptoms  3/28/2021 1003 by Vanessa David LPN  Outcome: Ongoing  3/28/2021 0032 by Ralph Rocha LPN  Outcome: Ongoing  Goal: Absence of new skin breakdown  Description: Absence of new skin breakdown  3/28/2021 1003 by Vanessa David LPN  Outcome: Ongoing  3/28/2021 0032 by Ralph Rocha LPN  Outcome: Ongoing     Problem: Infection - Surgical Site:  Goal: Will show no infection signs and symptoms  Description: Will show no infection signs and symptoms  3/28/2021 1003 by Vanessa David LPN  Outcome: Ongoing  3/28/2021 0032 by Ralph Rocha LPN  Outcome: Ongoing

## 2021-03-29 LAB
ALBUMIN SERPL-MCNC: 2.5 G/DL (ref 3.5–5.2)
ALP BLD-CCNC: 102 U/L (ref 35–104)
ALT SERPL-CCNC: <5 U/L (ref 5–33)
ANION GAP SERPL CALCULATED.3IONS-SCNC: 11 MMOL/L (ref 7–19)
AST SERPL-CCNC: 11 U/L (ref 5–32)
BASOPHILS ABSOLUTE: 0.1 K/UL (ref 0–0.2)
BASOPHILS RELATIVE PERCENT: 0.8 % (ref 0–1)
BILIRUB SERPL-MCNC: 0.5 MG/DL (ref 0.2–1.2)
BUN BLDV-MCNC: 7 MG/DL (ref 8–23)
C-REACTIVE PROTEIN: 13.94 MG/DL (ref 0–0.5)
CALCIUM SERPL-MCNC: 8.2 MG/DL (ref 8.8–10.2)
CHLORIDE BLD-SCNC: 96 MMOL/L (ref 98–111)
CO2: 30 MMOL/L (ref 22–29)
CREAT SERPL-MCNC: 0.4 MG/DL (ref 0.5–0.9)
EOSINOPHILS ABSOLUTE: 0.2 K/UL (ref 0–0.6)
EOSINOPHILS RELATIVE PERCENT: 2.6 % (ref 0–5)
GFR AFRICAN AMERICAN: >59
GFR NON-AFRICAN AMERICAN: >60
GLUCOSE BLD-MCNC: 82 MG/DL (ref 74–109)
HCT VFR BLD CALC: 31.8 % (ref 37–47)
HEMOGLOBIN: 9.6 G/DL (ref 12–16)
IMMATURE GRANULOCYTES #: 0.1 K/UL
LYMPHOCYTES ABSOLUTE: 1.5 K/UL (ref 1.1–4.5)
LYMPHOCYTES RELATIVE PERCENT: 23.5 % (ref 20–40)
MAGNESIUM: 1.3 MG/DL (ref 1.6–2.4)
MCH RBC QN AUTO: 27.8 PG (ref 27–31)
MCHC RBC AUTO-ENTMCNC: 30.2 G/DL (ref 33–37)
MCV RBC AUTO: 92.2 FL (ref 81–99)
MONOCYTES ABSOLUTE: 1 K/UL (ref 0–0.9)
MONOCYTES RELATIVE PERCENT: 16.1 % (ref 0–10)
NEUTROPHILS ABSOLUTE: 3.5 K/UL (ref 1.5–7.5)
NEUTROPHILS RELATIVE PERCENT: 55.9 % (ref 50–65)
PDW BLD-RTO: 16.9 % (ref 11.5–14.5)
PLATELET # BLD: 207 K/UL (ref 130–400)
PMV BLD AUTO: 10.1 FL (ref 9.4–12.3)
POTASSIUM REFLEX MAGNESIUM: 3.1 MMOL/L (ref 3.5–5)
RBC # BLD: 3.45 M/UL (ref 4.2–5.4)
SODIUM BLD-SCNC: 137 MMOL/L (ref 136–145)
TOTAL PROTEIN: 5.4 G/DL (ref 6.6–8.7)
WBC # BLD: 6.2 K/UL (ref 4.8–10.8)

## 2021-03-29 PROCEDURE — 97530 THERAPEUTIC ACTIVITIES: CPT

## 2021-03-29 PROCEDURE — 80053 COMPREHEN METABOLIC PANEL: CPT

## 2021-03-29 PROCEDURE — 6370000000 HC RX 637 (ALT 250 FOR IP): Performed by: PSYCHIATRY & NEUROLOGY

## 2021-03-29 PROCEDURE — 1180000000 HC REHAB R&B

## 2021-03-29 PROCEDURE — 83735 ASSAY OF MAGNESIUM: CPT

## 2021-03-29 PROCEDURE — 86140 C-REACTIVE PROTEIN: CPT

## 2021-03-29 PROCEDURE — 85025 COMPLETE CBC W/AUTO DIFF WBC: CPT

## 2021-03-29 PROCEDURE — 2580000003 HC RX 258: Performed by: PSYCHIATRY & NEUROLOGY

## 2021-03-29 PROCEDURE — 97110 THERAPEUTIC EXERCISES: CPT

## 2021-03-29 PROCEDURE — 99232 SBSQ HOSP IP/OBS MODERATE 35: CPT | Performed by: PSYCHIATRY & NEUROLOGY

## 2021-03-29 PROCEDURE — 36415 COLL VENOUS BLD VENIPUNCTURE: CPT

## 2021-03-29 PROCEDURE — 6360000002 HC RX W HCPCS: Performed by: PSYCHIATRY & NEUROLOGY

## 2021-03-29 PROCEDURE — 97535 SELF CARE MNGMENT TRAINING: CPT

## 2021-03-29 RX ORDER — TRAMADOL HYDROCHLORIDE 50 MG/1
50 TABLET ORAL 3 TIMES DAILY
Status: DISCONTINUED | OUTPATIENT
Start: 2021-03-29 | End: 2021-04-12 | Stop reason: HOSPADM

## 2021-03-29 RX ORDER — LANOLIN ALCOHOL/MO/W.PET/CERES
400 CREAM (GRAM) TOPICAL DAILY
Status: DISCONTINUED | OUTPATIENT
Start: 2021-03-29 | End: 2021-04-12 | Stop reason: HOSPADM

## 2021-03-29 RX ORDER — POTASSIUM CHLORIDE 20 MEQ/1
20 TABLET, EXTENDED RELEASE ORAL
Status: DISCONTINUED | OUTPATIENT
Start: 2021-03-29 | End: 2021-04-12 | Stop reason: HOSPADM

## 2021-03-29 RX ORDER — GABAPENTIN 300 MG/1
300 CAPSULE ORAL NIGHTLY
Status: DISCONTINUED | OUTPATIENT
Start: 2021-03-29 | End: 2021-04-12 | Stop reason: HOSPADM

## 2021-03-29 RX ADMIN — ISOSORBIDE MONONITRATE 60 MG: 60 TABLET, EXTENDED RELEASE ORAL at 08:35

## 2021-03-29 RX ADMIN — OXYCODONE 15 MG: 5 TABLET ORAL at 08:35

## 2021-03-29 RX ADMIN — ACYCLOVIR 400 MG: 200 CAPSULE ORAL at 08:35

## 2021-03-29 RX ADMIN — PREDNISONE 2 MG: 1 TABLET ORAL at 08:35

## 2021-03-29 RX ADMIN — SODIUM CHLORIDE, PRESERVATIVE FREE 10 ML: 5 INJECTION INTRAVENOUS at 08:43

## 2021-03-29 RX ADMIN — NALOXEGOL OXALATE 25 MG: 12.5 TABLET, FILM COATED ORAL at 08:35

## 2021-03-29 RX ADMIN — POTASSIUM CHLORIDE 20 MEQ: 1500 TABLET, EXTENDED RELEASE ORAL at 08:35

## 2021-03-29 RX ADMIN — ISOSORBIDE MONONITRATE 60 MG: 60 TABLET, EXTENDED RELEASE ORAL at 19:52

## 2021-03-29 RX ADMIN — OXYCODONE 15 MG: 5 TABLET ORAL at 17:06

## 2021-03-29 RX ADMIN — OXYCODONE 15 MG: 5 TABLET ORAL at 12:35

## 2021-03-29 RX ADMIN — CYCLOBENZAPRINE 10 MG: 10 TABLET, FILM COATED ORAL at 02:01

## 2021-03-29 RX ADMIN — GUAIFENESIN 1200 MG: 600 TABLET, EXTENDED RELEASE ORAL at 08:35

## 2021-03-29 RX ADMIN — Medication 1 TABLET: at 08:34

## 2021-03-29 RX ADMIN — PANTOPRAZOLE SODIUM 40 MG: 40 TABLET, DELAYED RELEASE ORAL at 08:35

## 2021-03-29 RX ADMIN — FUROSEMIDE 40 MG: 40 TABLET ORAL at 08:34

## 2021-03-29 RX ADMIN — CARVEDILOL 25 MG: 25 TABLET, FILM COATED ORAL at 17:06

## 2021-03-29 RX ADMIN — WATER 2000 MG: 1 INJECTION INTRAMUSCULAR; INTRAVENOUS; SUBCUTANEOUS at 19:39

## 2021-03-29 RX ADMIN — TRAMADOL HYDROCHLORIDE 50 MG: 50 TABLET, FILM COATED ORAL at 19:52

## 2021-03-29 RX ADMIN — ONDANSETRON HYDROCHLORIDE 4 MG: 4 TABLET, FILM COATED ORAL at 08:48

## 2021-03-29 RX ADMIN — CARVEDILOL 25 MG: 25 TABLET, FILM COATED ORAL at 08:34

## 2021-03-29 RX ADMIN — OXYCODONE 15 MG: 5 TABLET ORAL at 19:49

## 2021-03-29 RX ADMIN — HEPARIN SODIUM 5000 UNITS: 5000 INJECTION INTRAVENOUS; SUBCUTANEOUS at 08:36

## 2021-03-29 RX ADMIN — MAGNESIUM OXIDE TAB 400 MG (240 MG ELEMENTAL MG) 400 MG: 400 (240 MG) TAB at 08:34

## 2021-03-29 RX ADMIN — WATER 2000 MG: 1 INJECTION INTRAMUSCULAR; INTRAVENOUS; SUBCUTANEOUS at 02:43

## 2021-03-29 RX ADMIN — ONDANSETRON HYDROCHLORIDE 4 MG: 4 TABLET, FILM COATED ORAL at 02:43

## 2021-03-29 RX ADMIN — GUAIFENESIN 1200 MG: 600 TABLET, EXTENDED RELEASE ORAL at 19:48

## 2021-03-29 RX ADMIN — ACYCLOVIR 400 MG: 200 CAPSULE ORAL at 19:49

## 2021-03-29 RX ADMIN — HEPARIN SODIUM 5000 UNITS: 5000 INJECTION INTRAVENOUS; SUBCUTANEOUS at 19:59

## 2021-03-29 RX ADMIN — SODIUM CHLORIDE, PRESERVATIVE FREE 10 ML: 5 INJECTION INTRAVENOUS at 21:08

## 2021-03-29 RX ADMIN — LEVOTHYROXINE SODIUM 75 MCG: 75 TABLET ORAL at 05:42

## 2021-03-29 RX ADMIN — GABAPENTIN 100 MG: 100 CAPSULE ORAL at 08:36

## 2021-03-29 RX ADMIN — WATER 2000 MG: 1 INJECTION INTRAMUSCULAR; INTRAVENOUS; SUBCUTANEOUS at 11:16

## 2021-03-29 RX ADMIN — GABAPENTIN 400 MG: 300 CAPSULE ORAL at 19:58

## 2021-03-29 RX ADMIN — CYCLOBENZAPRINE 10 MG: 10 TABLET, FILM COATED ORAL at 11:15

## 2021-03-29 RX ADMIN — TRAMADOL HYDROCHLORIDE 50 MG: 50 TABLET, FILM COATED ORAL at 17:06

## 2021-03-29 ASSESSMENT — PAIN SCALES - GENERAL
PAINLEVEL_OUTOF10: 6
PAINLEVEL_OUTOF10: 0
PAINLEVEL_OUTOF10: 2
PAINLEVEL_OUTOF10: 6
PAINLEVEL_OUTOF10: 10

## 2021-03-29 ASSESSMENT — PAIN DESCRIPTION - FREQUENCY
FREQUENCY: INTERMITTENT
FREQUENCY: CONTINUOUS

## 2021-03-29 ASSESSMENT — PAIN DESCRIPTION - PAIN TYPE: TYPE: ACUTE PAIN

## 2021-03-29 ASSESSMENT — PAIN DESCRIPTION - ONSET: ONSET: GRADUAL

## 2021-03-29 ASSESSMENT — PAIN DESCRIPTION - LOCATION
LOCATION: HIP;BACK
LOCATION: HIP;BACK

## 2021-03-29 ASSESSMENT — PAIN DESCRIPTION - ORIENTATION
ORIENTATION: RIGHT;LOWER
ORIENTATION: RIGHT;LOWER

## 2021-03-29 ASSESSMENT — 9 HOLE PEG TEST
TESTTIME_SECONDS: 80
TEST_RESULT: NOT TESTED
TESTTIME_SECONDS: 67

## 2021-03-29 NOTE — PROGRESS NOTES
Required Braces or Orthoses?: Yes  Lower Extremity Weight Bearing Restrictions  Right Lower Extremity Weight Bearing: Weight Bearing As Tolerated  Left Lower Extremity Weight Bearing: Weight Bearing As Tolerated  Required Braces or Orthoses  Cervical: c-collar  Spinal: Lumbar Corset  Position Activity Restriction  Spinal Precautions: No Bending, No Lifting, No Twisting       Objective          Type of ROM/Therapeutic Exercise  Type of ROM/Therapeutic Exercise: Free weights  Comment: 1#; 10 reps in all planes    Plan   Plan  Current Treatment Recommendations: Self-Care / ADL, Safety Education & Training, Pain Management, Endurance Training, Functional Mobility Training, Strengthening, Patient/Caregiver Education & Training, Home Management Training, Equipment Evaluation, Education, & procurement, Balance Training, Positioning  OutComes Score       03/29/21 1300   Eating   Assistance Needed Supervision or touching assistance   CARE Score 4           Goals  Short term goals  Time Frame for Short term goals: 1 week  Short term goal 1: pt wll complete UB dressing with min A  Short term goal 2: pt will complete LB dressing with  min A  Short term goal 3: pt will complete overall bathing with min A  Short term goal 4: pt will complete simple ambulatory home making task with min A  Short term goal 5: pt will don/doff back brace with independence  Short term goal 6: pt will complete 1-2 handed standing task for 3 mins with CGA  Short term goal 7: pt will complete R FM/sensory acts to improve function of hand during ADL task  Short term goal 8: Pt will increase B  strength by 2#. Short term goal 9: Pt will decrease B 9 hole peg test time by 2 seconds.   Long term goals  Time Frame for Long term goals : 2 weeks  Long term goal 1: complete overall dressing with supervision  Long term goal 2: complete overall bathing with supervision  Long term goal 3: complete overall toileting with supervision  Long term goal 4: complete simple ambulatory home making task with supervision  Long term goal 5: complete HEP with independence       Therapy Time   Individual Concurrent Group Co-treatment   Time In 1300         Time Out 1345         Minutes 45         Timed Code Treatment Minutes: 45 Minutes  Variance: 15  Reason: Make up minutes(15 min Makeup from 3/27/2021.)    Yfn Gong, OT

## 2021-03-29 NOTE — PLAN OF CARE
Problem: Falls - Risk of:  Goal: Will remain free from falls  Description: Will remain free from falls  Outcome: Ongoing   Up with 1 assist

## 2021-03-29 NOTE — PROGRESS NOTES
Patient:   Durga Tinajero  MR#:    [id number]   Room:    0820/820-01   YOB: 1955  Date of Progress Note: 3/29/2021  Time of Note                           8:14 AM  Consulting Physician:   Deanna Fuchs M.D. Attending Physician:  Deanna Fuchs MD     CHIEF COMPLAINT: Generalized weakness and deconditioning     Subjective  This 77 y.o. female  admitted to [facility] initially on 1/14/2021. The patient had been in her usual state of health when she developed a torn right gluteal muscle. She underwent surgical repair on 1/14/21 and post-operative course was progressing. At her 4 week follow up, she developed increased bloody drainage from the wound with erythema. She presented for injection due to RA at which time she suffered a syncopal event. This was felt to be a vasovagal response and she returned to home. She was found later by a family member in the floor for an unknown length of time and the dressing to the right hip/gluteal wound was saturated. She presented to the hospital 2/8 and was noted to be febrile on intake. Workup revealed an elevated CPK and d dimer. Wound cultures were obtained. The patient was admitted to the service of the hospitalists at that time. She underwent pacemaker interrogation in workup for her syncopal episodes which detected no abnormalities. MRI brain negative for acute process. Purulent drainage from the wound was cultured and the patient was started on broad spectrum antibiotics. She was seen in consultation by orthopedics. Blood cultures returned positive for MSSA in all bottles. ID was consulted for antibiotic management at that time. She underwent debridement of the wound per ortho on 2/9. Post-operatively, the wound measures 20 cm x 6 cm and wound vac therapy was initiated. Surveillance cultures remained positive and surgical wound cultures positive for e. Coli. She continued with antibiotic therapy with Azactam under direction of ID.  Due to persistent bacteremia, she underwent ALFREDA which did not reveal any valvular vegetations. Due to her need for continued IV antibiotic therapy, wound care and rehabilitation, she transferred to the [facility]. She was seen in consultation by our wound care team and was followed by ID. She developed increased complaints of back pain and imaging revealed a right psoas abscess as well as possible L1-2 osteomyelitis discitis. Inflammatory markers were also elevated. The patient had also complained of increased weakness and incoordination to bilateral upper extremities. Neurosurgery was consulted and recommended cervical decompression as well as continued antibiotic therapy with plans for extension of lumbar fusion. She transferred to [facility] on 3/3 and underwent cervical corpectomy and decompression. She tolerated the procedure without difficulty and returned to our facility on 3/6. The patient was initially progressing well with therapy upon her return, but she developed increased right hip pain impeding further progress. Multiple regimen adjustments were undertaken without relief. She continued on antibiotic treatment under the direction of ID and continued wound care under the direction of our wound care team. She developed increased edema to BLE requiring gentle diuresis. Orthopedic surgery was consulted to evaluate the hip who felt it was more likely related to the persistent psoas abscess noted on imaging. Originally, the patient was scheduled for repeat drainage of the abscess on 3/19, but she was going to require sedation and had eaten breakfast. The procedure was then rescheduled for 3/22. Neurosurgery then did an L1-L2 discectomy on 3/23/21 for ongoing discitis osteomyelitis in that region that has thus far not responded to antibiotic therapy. Following I and D of psoas abscess, the patient was discharge from the [facility] to [facility] for planned neurosurgical procedure. Pt tolerated procedure well. She was fitted for a LSO brace.   Harinder Sexton consulted for antibiotic recommendations. Complains of being sore yesterday after her therapy session on Saturday. Says that she has not really been out of bed for 3 months prior to that. REVIEW OF SYSTEMS:  Constitutional: No fevers No chills  Neck:No stiffness  Respiratory: No shortness of breath  Cardiovascular: No chest pain No palpitations  Gastrointestinal: No abdominal pain    Genitourinary: No Dysuria  Neurological: No headache, no confusion      PHYSICAL EXAM:  BP (!) 182/83   Pulse 98   Temp 98.2 °F (36.8 °C) (Temporal)   Resp 16   Ht 5' 6\" (1.676 m)   Wt 197 lb 0.9 oz (89.4 kg)   SpO2 92%   BMI 31.81 kg/m²     Constitutional: she appears well-developed and well-nourished. Eyes  conjunctiva normal.  Pupils react to light  Ear, nose, throat -hearing intact to voice. No scars, masses, or lesions over external nose or ears, no atrophy of tongue  Neck-symmetric, no masses noted, no jugular vein distension  Respiration- chest wall appears symmetric, good expansion,   normal effort without use of accessory muscles  Cardiovascular- RRR  Musculoskeletal  no significant wasting of muscles noted, no bony deformities, gait no gross ataxia  Extremities-no clubbing, cyanosis or edema  Skin  warm, dry, and intact. No rash, erythema, or pallor. Psychiatric  mood, affect, and behavior appear normal.      Neurology  NEUROLOGICAL EXAM:  Awake, alert, fluent oriented x 3 appropriate affect  Attention and concentration appear appropriate  Recent and remote memory appears unremarkable  Speech normal without dysarthria  No clear issues with language of fund of knowledge     Cranial Nerve Exam   CN II- Visual fields grossly unremarkable  CN III, IV,VI-EOMI, No nystagmus, conjugate eye movements, no ptosis  CN VII-no facial assymetry  CN VIII-Hearing intact        Motor Exam  RA deformities noted in the hands.   There is mild generalized weakness and right hip weakness which is partly antalgic.          Tremors- no tremors in hands or head noted     Gait  Not tested      Nursing/pcp notes, imaging,labs and vitals reviewed. PT,OT and/or speech notes reviewed    Lab Results   Component Value Date    WBC 6.2 03/29/2021    HGB 9.6 (L) 03/29/2021    HCT 31.8 (L) 03/29/2021    MCV 92.2 03/29/2021     03/29/2021     Lab Results   Component Value Date     03/29/2021    K 3.1 (L) 03/29/2021    CL 96 (L) 03/29/2021    CO2 30 (H) 03/29/2021    BUN 7 (L) 03/29/2021    CREATININE 0.4 (L) 03/29/2021    GLUCOSE 82 03/29/2021    CALCIUM 8.2 (L) 03/29/2021    PROT 5.4 (L) 03/29/2021    LABALBU 2.5 (L) 03/29/2021    BILITOT 0.5 03/29/2021    ALKPHOS 102 03/29/2021    AST 11 03/29/2021    ALT <5 (A) 03/29/2021    LABGLOM >60 03/29/2021    GFRAA >59 03/29/2021     Lab Results   Component Value Date    INR 1.20 (H) 12/05/2018    INR 1.14 09/04/2018    INR 1.08 07/10/2018     Lab Results   Component Value Date    CRP 13.94 (H) 03/29/2021 03/27/21 1540 03/27/21 1541 03/27/21 1543   Restrictions/Precautions   Restrictions/Precautions Fall Risk;Weight Bearing  --   --    Required Braces or Orthoses? Yes  --   --    Lower Extremity Weight Bearing Restrictions   Right Lower Extremity Weight Bearing Weight Bearing As Tolerated  --   --    Required Braces or Orthoses   Cervical c-collar  --   --    Spinal Lumbar Corset  --   --    Position Activity Restriction   Spinal Precautions No Bending; No Lifting; No Twisting  --   --    Subjective   Subjective  --  Pt agreed to therapy this afternoon. --    Pain Screening   Patient Currently in Pain  --  Yes  --    Pain Assessment   Pain Assessment  --  0-10  --    Pain Level  --  4  --    Patient's Stated Pain Goal  --  No pain  --    Pain Type  --  Acute pain;Surgical pain  --    Pain Location  --  Hip  --    Pain Orientation  --  Right  --    Pain Descriptors  --  Aching; Throbbing  --    Pain Onset  --  On-going  --    Clinical Progression  --  Not changed  --    Functional

## 2021-03-29 NOTE — PATIENT CARE CONFERENCE
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 I&D PSOAS ABCESS      PHYSICAL THERAPY  STRENGTH  Strength RLE  Comment: 2-/5 HIP, 3/5 KNEE, 3+/5 ANKLE     ROM  AROM RLE (degrees)  RLE General AROM: DECREASED AT HIP  AROM LLE (degrees)  LLE AROM : WFL  BED MOBILITY  Bed Mobility  Rolling: Minimal assistance(TO THE RIGHT )  Supine to Sit: Moderate assistance  Sit to Supine: Minimal assistance(Reverse log roll)  Scooting: Contact guard assistance  Comment: log roll  TRANSFERS  Transfers  Sit to Stand: Contact guard assistance, Minimal Assistance(pull to  parallel bars )  Stand to sit: Contact guard assistance  Bed to Chair: Minimal assistance, Contact guard assistance  Stand Pivot Transfers: Contact guard assistance(RW)  Comment: BUE on w/c going sit to stand. Cuing to use UE going stand to sit. WHEELCHAIR PROPULSION  Propulsion 1  Propulsion: Manual  Level: Level Tile  Method: RUBINA FITZPATRICK  Level of Assistance: Stand by assistance, Contact guard assistance  Description/ Details: 2 TURNS  Distance: 50  AMBULATION  Ambulation 1  Surface: level tile  Device: Parallel Bars  Other Apparatus: Wheelchair follow  Assistance: Contact guard assistance, Minimal assistance  Quality of Gait: STEP TO PATTERN. DECREASED STEP LENGTH DUE TO FATIGUE   Gait Deviations: Slow Vania, Decreased step length  Distance: 5 FT  Comments: REQUIRES VCS TO PUSH THROUGH BUE TO ASSIST IN ADVANCING LLE.  FATIGUED MORE EASILY THIS AFTERNOON DUE TO FATIGUE AND PAIN IN RIGHT HIP   STAIRS     GOALS:  Short term goals  Time Frame for Short term goals: 1 WEEK  Short term goal 1: CGA FOR ON BED MOBILITY  Short term goal 2: CGA FOR TRANSFERS  Short term goal 3: CGA AMB 50 FEET WITH AD  Short term goal 4: INDEP W/C PROPEL 50 FEET  Short term goal 5: CGA CAR supervision  Long term goal 2: complete overall bathing with supervision  Long term goal 3: complete overall toileting with supervision  Long term goal 4: complete simple ambulatory home making task with supervision  Long term goal 5: complete HEP with independence    Assessment:  Performance deficits / Impairments: Decreased high-level IADLs, Decreased functional mobility , Decreased endurance, Decreased ADL status, Decreased balance, Decreased strength, Decreased coordination, Decreased sensation                  NUTRITION  Current Wt: Weight: 197 lb 0.9 oz (89.4 kg) / Body mass index is 31.81 kg/m². Admission Wt: Admission Body Weight: 205 lb (93 kg)  Oral Diet Orders: General; Dental Soft  Oral Nutrition Supplement (ONS) Orders:Ramiro and Ensure Enlive BID    Pt declining nutritionally and at risk for nutrition compromise AEB variable po intake and wt loss since admission. Pt with 4% wt loss since admission, some likeyl fluid related, however po intake has decreased as well. Pt now with wound vac, nutritional needs increased. Pt agreeable to Ramiro and Ensure supplementation. Please see nutrition note for details. NURSING  Wounds/Incisions/Ulcers: Wounds present Wound Vac was placed on right hip wound on 3-29-30 by wound care nurse and is to be changed on Monday, Wednesday, and Fridays. Varinder Scale Score: 17    Pain: Patient's pain is currently controlled with Oxycodone, Neurontin, and Tramadol. She slept well last night. Consultations/Labs/X-rays: Dr. Melinda Ford (Infectious Disease)    Family Education: No family available for education    Fall Risk:  Felicianojeovannyjonathan Cox Score: 80    Fall in the last week? NO      Other Nursing Issues: Patient has left upper arm PICC line, Takes meds one at a time with water, Wears neck collar at all times, Incontinent at times,  Patient has surgical incision right neck and on back. Healing well.         SOCIAL WORK/CASE MANAGEMENT  Assessment: Has family support- son, Dr. Tatyana Mccarty identifies her extensive needs still and expects another surgery in future. Her sister may be involved in care in future    Discharge Plan   Estimated Length of Stay: 4/12/21  Destination: nursing home placement    Pass: No    Services at Discharge: SNF Physical Therapy, Occupational Therapy and Nursing daily    Equipment at Discharge: to be provided at facility    Progress made in the prior week:  1.eval 3/27  2.  3.  4.  5.      Goals for following week:  1.complete LB dressing with mod A  2. AMBULATE 50 FT WITH RW CGA  3.   4.   5.     Factors facilitating achievement of predicted outcomes: Motivated, Cooperative and Pleasant    Barriers to the achievement of predicted outcomes: Pain, Decreased endurance, Upper extremity weakness and Lower extremity weakness    Team Members Present at Conference:  : Malina Kong, [facility]   Occupational Therapist: Anil Hancock, OTR/L  Physical Therapist: Fiordaliza Clifford PT  Speech Therapist: N/A  Nurse: Soumya Rosado, RN,BSN   Nurse Manager:  Soumya Rosado, RN, BSN  Dietitian:  Ap Hubbard, MS, RD, LD  Rehab Director:  Neri Colon approve the established interdisciplinary plan of care as documented within the medical record of Zamzam Donna.

## 2021-03-29 NOTE — PROGRESS NOTES
03/29/21 0955   Restrictions/Precautions   Restrictions/Precautions Fall Risk;Weight Bearing   Required Braces or Orthoses? Yes   Lower Extremity Weight Bearing Restrictions   Right Lower Extremity Weight Bearing Weight Bearing As Tolerated   Left Lower Extremity Weight Bearing Weight Bearing As Tolerated   Required Braces or Orthoses   Cervical c-collar   Spinal Lumbar Corset   Position Activity Restriction   Spinal Precautions No Bending; No Lifting; No Twisting   Pain Assessment   Pain Assessment 0-10   Pain Level 0  (None stated)   Bed Mobility   Rolling Minimal assistance;Rolling Left   Supine to Sit Moderate assistance   Sit to Supine Minimal assistance  (Reverse log roll)   Comment log roll   Transfers   Sit to Stand Moderate Assistance   Stand to sit Minimal Assistance   Comment BUE on w/c going sit to stand. Cuing to use UE going stand to sit. Exercises   Comments LAQ 1x8   Conditions Requiring Skilled Therapeutic Intervention   Assessment Pt. min A x1 to mod Ax1 with bed mobility and transfers. Pt. taken back to room early to have wound examined by surgeon. Activity Tolerance   Activity Tolerance Patient Tolerated treatment well   Safety Devices   Type of devices All fall risk precautions in place; Bed alarm in place;Call light within reach; Patient at risk for falls; Left in bed

## 2021-03-29 NOTE — PROGRESS NOTES
[facility]  Nurse  Consult Note       NAME:  Tariq Jessica  MEDICAL RECORD NUMBER:  [id number]  AGE: 77 y.o. GENDER: female  : 1955  TODAY'S DATE:  3/29/2021    Subjective   Reason for Wound Nurse Evaluation and Assessment: Surgical Wound to right lateral upper leg      Tariq Jessica is a 77 y.o. female referred by:   [x] Physician  [x] Nursing  [] Other:     Wound Identification:  Wound Type: non-healing surgical  Contributing Factors: obesity    Wound History: Patient had surgical repair of torn gluteal muscle on 21. On 21 she returned with wound infection and had I&D on 21. Subsequently developed psoas abscess that underwent I&D on 3/22/21. She apparently was receiving NPWT before transfer to Inpatient Rehab  Current Wound Care Treatment:  Wet to dry-will start NPWT    Patient Goal of Care:  [x] Wound Healing  [] Odor Control  [] Palliative Care  [] Pain Control   [] Other:         PAST MEDICAL HISTORY        Diagnosis Date    CAD (coronary artery disease)     S/p Stent to right coronary artery 2010. S/p myocardial infarction May 2013.  Cellulitis     LT LEG IN PAST    History of blood transfusion     History of DVT (deep vein thrombosis)     LT    History of neutropenia     History of pulmonary fibrosis     Hx of blood clots     LT LEG    Hypercholesterolemia     Hypertension     Intrinsic asthma     Pacemaker 2016    Post op infection     \"right knee leaking after my replacement\"    Rheumatoid arteritis ([address]     Sinus node dysfunction ([address]     Staph aureus infection     Status post placement of implantable loop recorder 2/13/15, 3/30/15    2/19/15  removed, infection    Syncope 2015    Thyroid disease     HYPOTHYROIDISM       PAST SURGICAL HISTORY    Past Surgical History:   Procedure Laterality Date    BACK SURGERY  2018    CARDIAC CATHETERIZATION  14  MDL    normal LV function.  EF 60%    CARDIAC CATHETERIZATION  1/26/15  MDL    EF Constipation      budesonide (PULMICORT) 0.5 MG/2ML nebulizer suspension Take 1 ampule by nebulization 2 times daily as needed      cyclobenzaprine (FLEXERIL) 10 MG tablet Take 10 mg by mouth 3 times daily as needed for Muscle spasms      gabapentin (NEURONTIN) 100 MG capsule Take 100 mg by mouth every 12 hours.  Heparin Sodium, Porcine, (HEPARIN, PORCINE,) 5000 UNIT/ML injection Inject 5,000 Units into the skin every 12 hours      labetalol (NORMODYNE;TRANDATE) 5 mg/ml injection Infuse 20 mg intravenously every 10 minutes as needed      lidocaine (LIDODERM) 5 % Place 1 patch onto the skin daily as needed for Pain 12 hours on, 12 hours off.       magnesium hydroxide (MILK OF MAGNESIA CONCENTRATE) 2400 MG/10ML SUSP Take 2,400 mg by mouth daily as needed      methylnaltrexone (RELISTOR) 12 MG/0.6ML SOLN injection Inject 6 mg into the skin every other day      naloxone (NARCAN) 0.4 MG/ML injection Infuse 0.1 mg intravenously every 5 minutes as needed      ondansetron (ZOFRAN) 4 MG tablet Take 4 mg by mouth every 6 hours as needed for Nausea or Vomiting      polyethylene glycol (GLYCOLAX) 17 g packet Take 17 g by mouth daily      senna-docusate (PERICOLACE) 8.6-50 MG per tablet Take 2 tablets by mouth 2 times daily      carvedilol (COREG) 12.5 MG tablet TAKE 1 TABLET BY MOUTH TWICE DAILY (Patient taking differently: Take 25 mg by mouth 2 times daily (with meals) ) 180 tablet 0    isosorbide mononitrate (IMDUR) 60 MG extended release tablet TAKE 1 TABLET BY MOUTH TWICE DAILY 180 tablet 0    nitroGLYCERIN (NITROSTAT) 0.4 MG SL tablet Place 1 tablet under the tongue every 5 minutes as needed for Chest pain (Patient taking differently: Place 0.4 mg under the tongue every 5 minutes as needed for Chest pain (No more than 3 doses in 15 minutes) ) 25 tablet 3    Multiple Vitamins-Minerals (THERAPEUTIC MULTIVITAMIN-MINERALS) tablet Take 1 tablet by mouth daily      docusate sodium (COLACE) 100 MG capsule Take 1 capsule by mouth daily To prevent constipation (Patient taking differently: Take 100 mg by mouth 2 times daily To prevent constipation) 20 capsule 0    predniSONE (DELTASONE) 1 MG tablet Take 2 mg by mouth daily      valACYclovir (VALTREX) 500 MG tablet Take 1 tablet by mouth daily 30 tablet 0    pantoprazole (PROTONIX) 40 MG tablet Take 1 tablet by mouth daily 30 tablet 3    cloNIDine (CATAPRES) 0.1 MG tablet Take 0.1 mg by mouth 2 times daily as needed       levothyroxine (SYNTHROID) 75 MCG tablet Take 75 mcg by mouth Daily       furosemide (LASIX) 40 MG tablet Take 40 mg by mouth daily.          Objective    BP (!) 182/83   Pulse 98   Temp 98.2 °F (36.8 °C) (Temporal)   Resp 16   Ht 5' 6\" (1.676 m)   Wt 197 lb 0.9 oz (89.4 kg)   SpO2 92%   BMI 31.81 kg/m²     LABS:  WBC:    Lab Results   Component Value Date    WBC 6.2 03/29/2021     H/H:    Lab Results   Component Value Date    HGB 9.6 03/29/2021    HCT 31.8 03/29/2021     PTT:    Lab Results   Component Value Date    APTT 33.5 12/05/2018    PTT 32.4 02/19/2015   [APTT}  PT/INR:    Lab Results   Component Value Date    PROTIME 15.2 12/05/2018    INR 1.20 12/05/2018     HgBA1c:  No results found for: LABA1C    Assessment   Varinder Risk Score: Varinder Scale Score: 18    Patient Active Problem List   Diagnosis Code    Coronary artery disease of native artery of native heart with stable angina pectoris (HCC) I25.118    Hypertension I10    Rheumatoid arteritis ([address] M05.20    History of pulmonary fibrosis Z87.09    History of DVT (deep vein thrombosis) Z86.718    Thyroid disease E07.9    Intrinsic asthma J45.909    Hypercholesterolemia E78.00    Syncope, cardiogenic R55    Near syncope R55    Status post placement of implantable loop recorder Z95.818    Left carotid bruit R09.89    S/P coronary artery stent placement Z95.5    Chest pain R07.9    Pacemaker Z95.0    Sinus node dysfunction (HCC) I49.5    Lumbar stenosis with neurogenic Plan:  Placement for patient upon discharge: home with support    Patient appropriate for Outpatient [address]: Yes    Referrals:  [x]   [x] [address] [employer] [facility]  [] Supplies  [] Other    Patient/Caregiver Teaching:  Level of patient/caregiver understanding able to:   [x] Indicates understanding       [] Needs reinforcement  [] Unsuccessful      [x] Verbal Understanding  [] Demonstrated understanding       [] No evidence of learning  [] Refused teaching         [] N/A       Patient has wound to right lateral upper leg s/p multiple debridements before admission to inpatient rehab. Wound is red with visible musle and <10% slough noted. See above for full description and measurements. Patient had been receiving NPWT at [facility] before admission. Will plan to resume NPWT today.     Electronically signed by   Roger Aschoff, RN, [facility] 3/29/2021

## 2021-03-29 NOTE — PROGRESS NOTES
03/29/21 1100   Restrictions/Precautions   Restrictions/Precautions Fall Risk;Weight Bearing   Required Braces or Orthoses? Yes   Lower Extremity Weight Bearing Restrictions   Right Lower Extremity Weight Bearing Weight Bearing As Tolerated   Left Lower Extremity Weight Bearing Weight Bearing As Tolerated   Required Braces or Orthoses   Cervical c-collar   Spinal Lumbar Corset   Position Activity Restriction   Spinal Precautions No Bending; No Lifting; No Twisting   Pain Screening   Patient Currently in Pain Yes   Pain Assessment   Pain Assessment 0-10   Pain Level 6   Non-Pharmaceutical Pain Intervention(s) Repositioned   Response to Pain Intervention Patient Satisfied   Bed Mobility   Rolling Contact guard assistance  (to the left)   Sit to Supine Minimal assistance   Scooting Contact guard assistance   Comment reverse log roll supine to sit   Transfers   Sit to Stand Minimal Assistance; Moderate Assistance   Stand to sit Contact guard assistance   Comment transferred  the left side of the bed   Exercises   Comments LAQ 1x10; standing w/weight shifts x5 seconds   Conditions Requiring Skilled Therapeutic Intervention   Assessment Pt. min A to mod A with transfers and CGA to min A with bed mobility. Pt. taken back to room to receive antibiotics and be seen by wound care nurse. Activity Tolerance   Activity Tolerance Patient limited by pain   Safety Devices   Type of devices All fall risk precautions in place; Bed alarm in place;Call light within reach; Patient at risk for falls; Left in bed

## 2021-03-29 NOTE — CONSULTS
Orthopaedic Surgery  Inpatient Consultation    Yossi Celmente ([id number])  3/29/2021    Requesting Physician: DR Paula Jon  Consulting Physician: DR Daniel Murdock  3/26/2021  2:21 PM    CHIEF COMPLAINT: S/P right HIP GLUTEUS REPAIR 1/14/21 WITH I&D WITH WOUND VAC ON 2/9/21. History Obtained From:  Patient/ CHART/ NO FAMILY MEMBERS PRESENT AT TIME OF CONSULTATION    HISTORY OF PRESENT ILLNESS:                The patient is a 77 y.o. female who presents with above chief complaint. FJSS 48 y.o. female  admitted to [facility] initially on 1/14/2021. The patient had been in her usual state of health when she developed a torn right gluteal muscle. She underwent surgical repair on 1/14/21 and post-operative course was progressing.   At her 4 week follow up, she developed increased bloody drainage from the wound with erythema. She presented for injection due to RA at which time she suffered a syncopal event. This was felt to be a vasovagal response and she returned to home. She was found later by a family member in the floor for an unknown length of time and the dressing to the right hip/gluteal wound was saturated.  She presented to the hospital 2/8 and was noted to be febrile on intake. Workup revealed an elevated CPK and d dimer. Wound cultures were obtained. The patient was admitted to the service of the hospitalists at that time. She underwent pacemaker interrogation in workup for her syncopal episodes which detected no abnormalities. MRI brain negative for acute process. Purulent drainage from the wound was cultured and the patient was started on broad spectrum antibiotics. She was seen in consultation by orthopedics. Blood cultures returned positive for MSSA in all bottles. ID was consulted for antibiotic management at that time. She underwent debridement of the wound per ortho on 2/9. Post-operatively, the wound measures 20 cm x 6 cm and wound vac therapy was initiated.  Surveillance cultures remained positive and surgical wound cultures positive for e. Coli. She continued with antibiotic therapy with Azactam under direction of ID. Due to persistent bacteremia, she underwent ALFREDA which did not reveal any valvular vegetations. Due to her need for continued IV antibiotic therapy, wound care and rehabilitation, she transferred to the [facility]. She was seen in consultation by our wound care team and was followed by ID. She developed increased complaints of back pain and imaging revealed a right psoas abscess as well as possible L1-2 osteomyelitis discitis. Inflammatory markers were also elevated. The patient had also complained of increased weakness and incoordination to bilateral upper extremities. Neurosurgery was consulted and recommended cervical decompression as well as continued antibiotic therapy with plans for extension of lumbar fusion. She transferred to [facility] on 3/3 and underwent cervical corpectomy and decompression. She tolerated the procedure without difficulty and returned to our facility on 3/6. The patient was initially progressing well with therapy upon her return, but she developed increased right hip pain impeding further progress. Multiple regimen adjustments were undertaken without relief. She continued on antibiotic treatment under the direction of ID and continued wound care under the direction of our wound care team. She developed increased edema to BLE requiring gentle diuresis. Orthopedic surgery was consulted to evaluate the hip who felt it was more likely related to the persistent psoas abscess noted on imaging. Originally, the patient was scheduled for repeat drainage of the abscess on 3/19, but she was going to require sedation and had eaten breakfast. The procedure was then rescheduled for 3/22.  Neurosurgery then did an L1-L2 discectomy on 3/23/21 for ongoing discitis osteomyelitis in that region that has thus far not responded to antibiotic therapy.  Following I and D of psoas abscess, the patient was discharge from the [facility] to [facility] for planned neurosurgical procedure. Pt tolerated procedure well. She was fitted for a LSO brace. Dr Thang Brooks consulted for antibiotic recommendations. Complains of being sore yesterday after her therapy session on Saturday. Says that she has not really been out of bed for 3 months prior to that. PT REPORTS HER RIGHT HIP PAIN IS MODERATE AT TIMES. SHE DENIES ANY FEVER OR INCREASED DRAINAGE FROM WOUND. SHE REPORTS THAT HER VAC WAS REMOVED PRIOR TO COMING TO [facility]. PAIN IS PIERCING, INTERMITTENT. BETTER WITH VAC INTACT. Past Medical History:        Diagnosis Date    CAD (coronary artery disease)     S/p Stent to right coronary artery 9/2010. S/p myocardial infarction May 2013.  Cellulitis     LT LEG IN PAST    History of blood transfusion     History of DVT (deep vein thrombosis)     LT    History of neutropenia     History of pulmonary fibrosis     Hx of blood clots 1992    LT LEG    Hypercholesterolemia     Hypertension     Intrinsic asthma     Pacemaker 11/17/2016    Post op infection     \"right knee leaking after my replacement\"    Rheumatoid arteritis ([address]     Sinus node dysfunction ([address]     Staph aureus infection     Status post placement of implantable loop recorder 2/13/15, 3/30/15    2/19/15  removed, infection    Syncope 2/11/2015    Thyroid disease     HYPOTHYROIDISM       Past Surgical History:        Procedure Laterality Date    BACK SURGERY  01/01/2018    CARDIAC CATHETERIZATION  5/14/14  MDL    normal LV function. EF 60%    CARDIAC CATHETERIZATION  1/26/15  MDL    EF 60%, Left circ has long 80-90% in small 2mm vessel, diffuse disease in RCA    CORONARY ANGIOPLASTY WITH STENT PLACEMENT  2014    DIAGNOSTIC CARDIAC CATH LAB PROCEDURE      Right coronary artery stent.  9/2010    JOINT REPLACEMENT      LEEP      PACEMAKER INSERTION  11/17/2016    Dr Alena Franco; medtronic    PACEMAKER PLACEMENT      medtronic    [place] INCIS/DRAIN THIGH/KNEE ABSCESS,DEEP Right 10/23/2018    KNEE INCISION AND DRAINAGE performed by Zuhair Pelayo MD at [facility] 90 Right 9/11/2018    KNEE TOTAL ARTHROPLASTY performed by Zuhair Pelayo MD at [facility] 48 Right 12/6/2018    KNEE EXPLANT AND PLACEMENT OF ANTIBIOTIC SPACER performed by Zuhair Pelayo MD at [facility] OR       Current Medications:   Current Facility-Administered Medications: magnesium oxide (MAG-OX) tablet 400 mg, 400 mg, Oral, Daily  potassium chloride (KLOR-CON M) extended release tablet 20 mEq, 20 mEq, Oral, Daily with breakfast  gabapentin (NEURONTIN) capsule 300 mg, 300 mg, Oral, Nightly  miconazole (MICOTIN) 2 % powder, , Topical, PRN  sodium chloride flush 0.9 % injection 10 mL, 10 mL, Intravenous, PRN  sodium chloride flush 0.9 % injection 10 mL, 10 mL, Intravenous, BID  oxyCODONE (ROXICODONE) immediate release tablet 15 mg, 15 mg, Oral, 4x Daily  furosemide (LASIX) tablet 40 mg, 40 mg, Oral, Daily  levothyroxine (SYNTHROID) tablet 75 mcg, 75 mcg, Oral, Daily  cloNIDine (CATAPRES) tablet 0.1 mg, 0.1 mg, Oral, BID PRN  acyclovir (ZOVIRAX) capsule 400 mg, 400 mg, Oral, BID  pantoprazole (PROTONIX) tablet 40 mg, 40 mg, Oral, Daily  predniSONE (DELTASONE) tablet 2 mg, 2 mg, Oral, Daily  docusate sodium (COLACE) capsule 100 mg, 100 mg, Oral, BID  therapeutic multivitamin-minerals 1 tablet, 1 tablet, Oral, Daily  nitroGLYCERIN (NITROSTAT) SL tablet 0.4 mg, 0.4 mg, Sublingual, Q5 Min PRN  isosorbide mononitrate (IMDUR) extended release tablet 60 mg, 60 mg, Oral, BID  carvedilol (COREG) tablet 25 mg, 25 mg, Oral, BID WC  guaiFENesin (MUCINEX) extended release tablet 1,200 mg, 1,200 mg, Oral, BID  bisacodyl (DULCOLAX) suppository 10 mg, 10 mg, Rectal, Daily PRN  budesonide (PULMICORT) nebulizer suspension 500 mcg, 500 mcg, Nebulization, BID PRN  cyclobenzaprine (FLEXERIL) tablet 10 mg, 10 mg, Oral, TID PRN  gabapentin (NEURONTIN) capsule 100 mg, 100 mg, Oral, Q12H  heparin (porcine) injection 5,000 Units, 5,000 Units, Subcutaneous, Q12H  lidocaine 4 % external patch 1 patch, 1 patch, Transdermal, Daily  magnesium hydroxide (MILK OF MAGNESIA) 400 MG/5ML suspension 30 mL, 30 mL, Oral, Daily PRN  naloxegol (MOVANTIK) tablet 25 mg, 25 mg, Oral, QAM  ondansetron (ZOFRAN) tablet 4 mg, 4 mg, Oral, Q6H PRN  polyethylene glycol (GLYCOLAX) packet 17 g, 17 g, Oral, Daily  sennosides-docusate sodium (SENOKOT-S) 8.6-50 MG tablet 2 tablet, 2 tablet, Oral, BID  acetaminophen (TYLENOL) tablet 650 mg, 650 mg, Oral, Q4H PRN  bisacodyl (DULCOLAX) suppository 10 mg, 10 mg, Rectal, Daily PRN  ceFAZolin (ANCEF) 2,000 mg in sterile water 20 mL IV syringe, 2,000 mg, Intravenous, Q8H  Prior to Admission medications    Medication Sig Start Date End Date Taking? Authorizing Provider   dextrose 5 % SOLN 100 mL with ceFAZolin 1 g SOLR 2,000 mg Infuse 2,000 mg intravenously every 8 hours In 100cc 0.9% normal saline   Yes Historical Provider, MD   guaiFENesin (MUCINEX) 600 MG extended release tablet Take 1,200 mg by mouth 2 times daily   Yes Historical Provider, MD   oxyCODONE-acetaminophen (PERCOCET)  MG per tablet Take 1 tablet by mouth every 4 hours as needed for Pain. Yes Historical Provider, MD   bisacodyl (DULCOLAX) 10 MG suppository Place 10 mg rectally daily as needed for Constipation   Yes Historical Provider, MD   budesonide (PULMICORT) 0.5 MG/2ML nebulizer suspension Take 1 ampule by nebulization 2 times daily as needed   Yes Historical Provider, MD   cyclobenzaprine (FLEXERIL) 10 MG tablet Take 10 mg by mouth 3 times daily as needed for Muscle spasms   Yes Historical Provider, MD   gabapentin (NEURONTIN) 100 MG capsule Take 100 mg by mouth every 12 hours.    Yes Historical Provider, MD   Heparin Sodium, Porcine, (HEPARIN, PORCINE,) 5000 UNIT/ML injection Inject 5,000 Units into the skin every 12 hours   Yes Historical Provider, MD labetalol (NORMODYNE;TRANDATE) 5 mg/ml injection Infuse 20 mg intravenously every 10 minutes as needed   Yes Historical Provider, MD   lidocaine (LIDODERM) 5 % Place 1 patch onto the skin daily as needed for Pain 12 hours on, 12 hours off.    Yes Historical Provider, MD   magnesium hydroxide (MILK OF MAGNESIA CONCENTRATE) 2400 MG/10ML SUSP Take 2,400 mg by mouth daily as needed   Yes Historical Provider, MD   methylnaltrexone (RELISTOR) 12 MG/0.6ML SOLN injection Inject 6 mg into the skin every other day   Yes Historical Provider, MD   naloxone (NARCAN) 0.4 MG/ML injection Infuse 0.1 mg intravenously every 5 minutes as needed   Yes Historical Provider, MD   ondansetron (ZOFRAN) 4 MG tablet Take 4 mg by mouth every 6 hours as needed for Nausea or Vomiting   Yes Historical Provider, MD   polyethylene glycol (GLYCOLAX) 17 g packet Take 17 g by mouth daily   Yes Historical Provider, MD   senna-docusate (PERICOLACE) 8.6-50 MG per tablet Take 2 tablets by mouth 2 times daily   Yes Historical Provider, MD   carvedilol (COREG) 12.5 MG tablet TAKE 1 TABLET BY MOUTH TWICE DAILY  Patient taking differently: Take 25 mg by mouth 2 times daily (with meals)  9/3/20  Yes MELISSA Becerra CNP   isosorbide mononitrate (IMDUR) 60 MG extended release tablet TAKE 1 TABLET BY MOUTH TWICE DAILY 7/10/20  Yes MELISSA Trent CNP   nitroGLYCERIN (NITROSTAT) 0.4 MG SL tablet Place 1 tablet under the tongue every 5 minutes as needed for Chest pain  Patient taking differently: Place 0.4 mg under the tongue every 5 minutes as needed for Chest pain (No more than 3 doses in 15 minutes)  7/23/19  Yes MELISSA Wilson   Multiple Vitamins-Minerals (THERAPEUTIC MULTIVITAMIN-MINERALS) tablet Take 1 tablet by mouth daily   Yes Historical Provider, MD   docusate sodium (COLACE) 100 MG capsule Take 1 capsule by mouth daily To prevent constipation  Patient taking differently: Take 100 mg by mouth 2 times daily To prevent constipation 9/12/18  Yes Slick Wyatt MD   predniSONE (DELTASONE) 1 MG tablet Take 2 mg by mouth daily 3/27/21  Yes Historical Provider, MD   valACYclovir (VALTREX) 500 MG tablet Take 1 tablet by mouth daily 2/2/18  Yes Yaz Jackman MD   pantoprazole (PROTONIX) 40 MG tablet Take 1 tablet by mouth daily 2/2/18  Yes Yaz Jackman MD   cloNIDine (CATAPRES) 0.1 MG tablet Take 0.1 mg by mouth 2 times daily as needed  6/29/17  Yes Historical Provider, MD   levothyroxine (SYNTHROID) 75 MCG tablet Take 75 mcg by mouth Daily  5/15/14  Yes Historical Provider, MD   furosemide (LASIX) 40 MG tablet Take 40 mg by mouth daily. Yes Historical Provider, MD       Allergies:  Amoxicillin, Lipitor, Niaspan [niacin er], Penicillins, Relafen [nabumetone], and Zocor [simvastatin]    Social History:   Social History     Socioeconomic History    Marital status:       Spouse name: Not on file    Number of children: Not on file    Years of education: Not on file    Highest education level: Not on file   Occupational History    Not on file   Social Needs    Financial resource strain: Not on file    Food insecurity     Worry: Not on file     Inability: Not on file    Transportation needs     Medical: Not on file     Non-medical: Not on file   Tobacco Use    Smoking status: Never Smoker    Smokeless tobacco: Never Used   Substance and Sexual Activity    Alcohol use: No    Drug use: No    Sexual activity: Not on file   Lifestyle    Physical activity     Days per week: Not on file     Minutes per session: Not on file    Stress: Not on file   Relationships    Social connections     Talks on phone: Not on file     Gets together: Not on file     Attends Taoism service: Not on file     Active member of club or organization: Not on file     Attends meetings of clubs or organizations: Not on file     Relationship status: Not on file    Intimate partner violence     Fear of current or ex partner: Not on file Vitals:   Vitals:    03/28/21 1700 03/28/21 1920 03/28/21 1958 03/29/21 0647   BP: (!) 178/88 135/68  (!) 182/83   Pulse: 80 78 78 98   Resp:  16  16   Temp:  97.9 °F (36.6 °C)  98.2 °F (36.8 °C)   TempSrc:  Temporal  Temporal   SpO2:  92%  92%   Weight:       Height:         General:  Appears stated age, no distress. Orientation:  Alert and oriented to time, place, and person. Mood and Affect:  Cooperative and pleasant. Gait:  Resting comfortably in bed. Cardiovascular:  Symmetric 1-2 plus pulses in upper and lower extremities. Lymph:  No cervical or inguinal lymphadenopathy noted. Sensation:  Grossly intact to light touch. DTR:  Normal, no pathologic reflexes. Coordination/balance:  NOT TESTED    Musculoskeletal:  Right upper extremity exam:  There is no tenderness to palpation about the shoulder, elbow, wrist or hand. Unrestricted full function motion is present. Stability is normal with provocative tests, 5/5 strength, and skin is normal.      Left upper extremity exam:  There is no tenderness to palpation about the shoulder, elbow, wrist or hand. Unrestricted full function motion is present. Stability is normal with provocative tests, 5/5 strength, and skin is normal.     Right lower extremity exam:  BANDAGE INTACT, WOUND APPEARS STABLE. LOOKS AS IF SHE IS HAVING AN ALLERGIC TYPE REACTION TO THE BANDAGE/TAPE. WOUND IS 18. 5CM X 8.5CM X 10 CM, NVI DISTALLY    Left lower extremity exam:  There is no tenderness to palpation about the hip, knee, ankle or foot. Unrestricted full function motion is present.   Stability is normal with provocative tests, 5/5 strength, and skin is normal.      DATA:    CBC with Differential:    Lab Results   Component Value Date    WBC 6.2 03/29/2021    RBC 3.45 03/29/2021    HGB 9.6 03/29/2021    HCT 31.8 03/29/2021     03/29/2021    MCV 92.2 03/29/2021    MCH 27.8 03/29/2021    MCHC 30.2 03/29/2021    RDW 16.9 03/29/2021    LYMPHOPCT 23.5 03/29/2021    MONOPCT 16.1 JONEL 03/29/21 1:00 PM

## 2021-03-29 NOTE — PROGRESS NOTES
Infectious Diseases Progress Note    Patient:  Carlo Nelson  YOB: 1955  MRN: [id number]   Admit date: 3/26/2021   Admitting Physician: Zander Squires MD  Primary Care Physician: Shy Brandt MD    Chief Complaint/Interval History: She was dressed and up to wheelchair this morning. She looks good sitting up in wheelchair. She has some hip and back soreness. Hopefully she can work effectively with physical therapy today. No pain at PICC line site. No rash or skin itching. No fevers or chills. Laboratory work today reviewed. In/Out    Intake/Output Summary (Last 24 hours) at 3/29/2021 0722  Last data filed at 3/29/2021 0458  Gross per 24 hour   Intake 650 ml   Output    Net 650 ml     Allergies:    Allergies   Allergen Reactions    Amoxicillin Rash    Lipitor Rash    Niaspan [Niacin Er] Rash    Penicillins Rash    Relafen [Nabumetone] Rash    Zocor [Simvastatin] Rash     Muscle cramps     Current Meds: sodium chloride flush 0.9 % injection 10 mL, PRN  sodium chloride flush 0.9 % injection 10 mL, BID  oxyCODONE (ROXICODONE) immediate release tablet 15 mg, 4x Daily  furosemide (LASIX) tablet 40 mg, Daily  levothyroxine (SYNTHROID) tablet 75 mcg, Daily  cloNIDine (CATAPRES) tablet 0.1 mg, BID PRN  acyclovir (ZOVIRAX) capsule 400 mg, BID  pantoprazole (PROTONIX) tablet 40 mg, Daily  predniSONE (DELTASONE) tablet 2 mg, Daily  docusate sodium (COLACE) capsule 100 mg, BID  therapeutic multivitamin-minerals 1 tablet, Daily  nitroGLYCERIN (NITROSTAT) SL tablet 0.4 mg, Q5 Min PRN  isosorbide mononitrate (IMDUR) extended release tablet 60 mg, BID  carvedilol (COREG) tablet 25 mg, BID WC  guaiFENesin (MUCINEX) extended release tablet 1,200 mg, BID  bisacodyl (DULCOLAX) suppository 10 mg, Daily PRN  budesonide (PULMICORT) nebulizer suspension 500 mcg, BID PRN  cyclobenzaprine (FLEXERIL) tablet 10 mg, TID PRN  gabapentin (NEURONTIN) capsule 100 mg, Q12H  heparin (porcine) injection 5,000 Units, Q12H lidocaine 4 % external patch 1 patch, Daily  magnesium hydroxide (MILK OF MAGNESIA) 400 MG/5ML suspension 30 mL, Daily PRN  naloxegol (MOVANTIK) tablet 25 mg, QAM  ondansetron (ZOFRAN) tablet 4 mg, Q6H PRN  polyethylene glycol (GLYCOLAX) packet 17 g, Daily  sennosides-docusate sodium (SENOKOT-S) 8.6-50 MG tablet 2 tablet, BID  acetaminophen (TYLENOL) tablet 650 mg, Q4H PRN  bisacodyl (DULCOLAX) suppository 10 mg, Daily PRN  ceFAZolin (ANCEF) 2,000 mg in sterile water 20 mL IV syringe, Q8H      Review of Systems see HPI    VitalSigns:  BP (!) 182/83   Pulse 98   Temp 98.2 °F (36.8 °C) (Temporal)   Resp 16   Ht 5' 6\" (1.676 m)   Wt 197 lb 0.9 oz (89.4 kg)   SpO2 92%   BMI 31.81 kg/m²      Physical Exam  Line/IV (left arm PICC) site: No erythema, warmth, induration, or tenderness. Bilateral lower extremity sensation intact. Seems to have stable motor findings upper and lower extremities    Lab Results:  CBC:   Recent Labs     03/26/21  1518 03/29/21  0310   WBC 6.2 6.2   HGB 10.4* 9.6*    207     BMP:  Recent Labs     03/29/21  0310      K 3.1*   CL 96*   CO2 30*   BUN 7*   CREATININE 0.4*   GLUCOSE 82     CultureResults: No new findings from previous.   Blood cultures February 8, 2021 through February 10, 2021-MSSA  (Susceptibility outlined below)  Blood cultures February 11, 2021-no growth  BLood cultures February 12, 2021-no growth  Right psoas abscess cultures March 2, 2021-MSSA   Right psoas abscess cultures March 22, 2021-MSSA    Susceptibility testing on blood culture from February 8, 2021:  Susceptibility    Staphylococcus aureus   TEMI   Gentamicin <=0.5 ug/ml Susceptible   Oxacillin 0.5 ug/ml Susceptible   Rifampin <=0.5 ug/ml Susceptible   Vancomycin 1 ug/ml Susceptible     Susceptibility testing on right psoas abscess culture from March 22, 2021:  Susceptibility    Staphylococcus aureus   TEMI   Gentamicin <=0.5 ug/ml Susceptible   Oxacillin 0.5 ug/ml Susceptible   Rifampin <=0.5 ug/ml Susceptible   Vancomycin <=0.5 ug/ml Susceptible     Radiology: None    Additional Studies Reviewed:  None    Impression:  1. MSSA bacteremia-under treatment  2. Right psoas abscess-MSSA. She has had the abscess aspirated twice. 3.  Discitis/osteomyelitis L1/L2-underwent lumbar surgery with decompressive laminectomy  4. Previous cervical spine stenosis-underwent surgical decompression  5. Rheumatoid arthritis  6. History of pacemaker placement  7.   History wound infection following gluteal muscle tear and repair    Recommendations:  Overall stable  Continue physical therapy  She is currently day 45 of antibiotic treatment-planning 8 to 12-week course  Continue supportive care  Will see every 2 to 3 days  Asked Dr. Natanael Mena to reevaluate-he had done her previous gluteal muscle repair    Eddi Higuera MD

## 2021-03-29 NOTE — PLAN OF CARE
Problem: Falls - Risk of:  Goal: Will remain free from falls  Description: Will remain free from falls  Outcome: Ongoing  Goal: Absence of physical injury  Description: Absence of physical injury  Outcome: Ongoing     Problem: Pain:  Description: Pain management should include both nonpharmacologic and pharmacologic interventions. Goal: Pain level will decrease  Description: Pain level will decrease  Outcome: Ongoing  Goal: Control of acute pain  Description: Control of acute pain  Outcome: Ongoing  Goal: Control of chronic pain  Description: Control of chronic pain  Outcome: Ongoing     Problem: SAFETY  Goal: Free from accidental physical injury  Outcome: Ongoing  Goal: Free from intentional harm  Outcome: Ongoing  Goal: LTG - Patient will demonstrate safety requirements appropriate to situation/environment  Outcome: Ongoing  Goal: LTG - patient will utilize safety techniques  Outcome: Ongoing  Goal: STG - patient locks brakes on wheelchair  Outcome: Ongoing  Goal: STG - Patient uses call light consistently to request assistance with transfers  Outcome: Ongoing  Goal: STG - patient uses gait belt during all transfers  Outcome: Ongoing     Problem: DAILY CARE  Goal: Daily care needs are met  Outcome: Ongoing     Problem: PAIN  Goal: Patient's pain/discomfort is manageable  Outcome: Ongoing  Goal: STG - pain is manageable through therapies  Outcome: Ongoing  Goal: STG - Patient will verbalize an acceptable level of pain  Outcome: Ongoing  Goal: STG - patients pain is managed to allow active participation in daily activities  Outcome: Ongoing     Problem: SKIN INTEGRITY  Goal: Skin integrity is maintained or improved  Outcome: Ongoing  Goal: LTG - Patient will be free from infection  Outcome: Ongoing  Goal: STG - Patient demonstrates skin care/treatment/dressing change  Outcome: Ongoing  Goal: STG - patient will maintain good skin integrity  Outcome: Ongoing     Problem: KNOWLEDGE DEFICIT  Goal: Patient/S. O. demonstrates understanding of disease process, treatment plan, medications, and discharge instructions.   Outcome: Ongoing     Problem: IP BOWEL ELIMINATION  Goal: LTG - patient will have regular and routine bowel evacuation  Outcome: Ongoing     Problem: IP BREATHING  Goal: STG - respiratory rate and effort will be within normal limits for the patient  Outcome: Ongoing     Problem: NUTRITION  Description: Altered Nutrition and Hydration  Goal: Patient/Family demonstrates understanding of diet  Outcome: Ongoing     Problem: Mobility - Impaired:  Goal: Mobility will improve  Description: Mobility will improve  Outcome: Ongoing     Problem: Nutrition  Goal: Optimal nutrition therapy  Outcome: Ongoing     Problem: Bleeding:  Goal: Will show no signs and symptoms of excessive bleeding  Description: Will show no signs and symptoms of excessive bleeding  Outcome: Ongoing     Problem: Skin Integrity:  Goal: Will show no infection signs and symptoms  Description: Will show no infection signs and symptoms  Outcome: Ongoing  Goal: Absence of new skin breakdown  Description: Absence of new skin breakdown  Outcome: Ongoing     Problem: Infection - Surgical Site:  Goal: Will show no infection signs and symptoms  Description: Will show no infection signs and symptoms  Outcome: Ongoing

## 2021-03-30 PROCEDURE — 6360000002 HC RX W HCPCS: Performed by: PSYCHIATRY & NEUROLOGY

## 2021-03-30 PROCEDURE — 97110 THERAPEUTIC EXERCISES: CPT

## 2021-03-30 PROCEDURE — 97116 GAIT TRAINING THERAPY: CPT

## 2021-03-30 PROCEDURE — 99232 SBSQ HOSP IP/OBS MODERATE 35: CPT | Performed by: PSYCHIATRY & NEUROLOGY

## 2021-03-30 PROCEDURE — 97530 THERAPEUTIC ACTIVITIES: CPT

## 2021-03-30 PROCEDURE — 97535 SELF CARE MNGMENT TRAINING: CPT

## 2021-03-30 PROCEDURE — 6370000000 HC RX 637 (ALT 250 FOR IP): Performed by: PSYCHIATRY & NEUROLOGY

## 2021-03-30 PROCEDURE — 2580000003 HC RX 258: Performed by: PSYCHIATRY & NEUROLOGY

## 2021-03-30 PROCEDURE — 1180000000 HC REHAB R&B

## 2021-03-30 RX ADMIN — WATER 2000 MG: 1 INJECTION INTRAMUSCULAR; INTRAVENOUS; SUBCUTANEOUS at 02:52

## 2021-03-30 RX ADMIN — HEPARIN SODIUM 5000 UNITS: 5000 INJECTION INTRAVENOUS; SUBCUTANEOUS at 22:17

## 2021-03-30 RX ADMIN — WATER 2000 MG: 1 INJECTION INTRAMUSCULAR; INTRAVENOUS; SUBCUTANEOUS at 11:57

## 2021-03-30 RX ADMIN — POTASSIUM CHLORIDE 20 MEQ: 1500 TABLET, EXTENDED RELEASE ORAL at 08:05

## 2021-03-30 RX ADMIN — ACYCLOVIR 400 MG: 200 CAPSULE ORAL at 22:15

## 2021-03-30 RX ADMIN — SODIUM CHLORIDE, PRESERVATIVE FREE 10 ML: 5 INJECTION INTRAVENOUS at 08:00

## 2021-03-30 RX ADMIN — GUAIFENESIN 1200 MG: 600 TABLET, EXTENDED RELEASE ORAL at 22:14

## 2021-03-30 RX ADMIN — ISOSORBIDE MONONITRATE 60 MG: 60 TABLET, EXTENDED RELEASE ORAL at 08:05

## 2021-03-30 RX ADMIN — SODIUM CHLORIDE, PRESERVATIVE FREE 10 ML: 5 INJECTION INTRAVENOUS at 22:31

## 2021-03-30 RX ADMIN — PREDNISONE 2 MG: 1 TABLET ORAL at 08:05

## 2021-03-30 RX ADMIN — POLYETHYLENE GLYCOL 3350 17 G: 17 POWDER, FOR SOLUTION ORAL at 08:04

## 2021-03-30 RX ADMIN — WATER 2000 MG: 1 INJECTION INTRAMUSCULAR; INTRAVENOUS; SUBCUTANEOUS at 18:56

## 2021-03-30 RX ADMIN — OXYCODONE 15 MG: 5 TABLET ORAL at 18:01

## 2021-03-30 RX ADMIN — DOCUSATE SODIUM 100 MG: 100 CAPSULE, LIQUID FILLED ORAL at 22:16

## 2021-03-30 RX ADMIN — CARVEDILOL 25 MG: 25 TABLET, FILM COATED ORAL at 18:02

## 2021-03-30 RX ADMIN — LEVOTHYROXINE SODIUM 75 MCG: 75 TABLET ORAL at 06:18

## 2021-03-30 RX ADMIN — OXYCODONE 15 MG: 5 TABLET ORAL at 13:23

## 2021-03-30 RX ADMIN — ACYCLOVIR 400 MG: 200 CAPSULE ORAL at 08:05

## 2021-03-30 RX ADMIN — GUAIFENESIN 1200 MG: 600 TABLET, EXTENDED RELEASE ORAL at 08:04

## 2021-03-30 RX ADMIN — OXYCODONE 15 MG: 5 TABLET ORAL at 22:16

## 2021-03-30 RX ADMIN — HEPARIN SODIUM 5000 UNITS: 5000 INJECTION INTRAVENOUS; SUBCUTANEOUS at 08:05

## 2021-03-30 RX ADMIN — GABAPENTIN 300 MG: 300 CAPSULE ORAL at 22:15

## 2021-03-30 RX ADMIN — ISOSORBIDE MONONITRATE 60 MG: 60 TABLET, EXTENDED RELEASE ORAL at 22:14

## 2021-03-30 RX ADMIN — Medication 1 TABLET: at 08:04

## 2021-03-30 RX ADMIN — OXYCODONE 15 MG: 5 TABLET ORAL at 08:05

## 2021-03-30 RX ADMIN — TRAMADOL HYDROCHLORIDE 50 MG: 50 TABLET, FILM COATED ORAL at 13:24

## 2021-03-30 RX ADMIN — CARVEDILOL 25 MG: 25 TABLET, FILM COATED ORAL at 08:05

## 2021-03-30 RX ADMIN — TRAMADOL HYDROCHLORIDE 50 MG: 50 TABLET, FILM COATED ORAL at 08:04

## 2021-03-30 RX ADMIN — GABAPENTIN 100 MG: 100 CAPSULE ORAL at 08:04

## 2021-03-30 RX ADMIN — TRAMADOL HYDROCHLORIDE 50 MG: 50 TABLET, FILM COATED ORAL at 22:15

## 2021-03-30 RX ADMIN — NALOXEGOL OXALATE 25 MG: 12.5 TABLET, FILM COATED ORAL at 08:04

## 2021-03-30 RX ADMIN — MAGNESIUM OXIDE TAB 400 MG (240 MG ELEMENTAL MG) 400 MG: 400 (240 MG) TAB at 08:04

## 2021-03-30 RX ADMIN — PANTOPRAZOLE SODIUM 40 MG: 40 TABLET, DELAYED RELEASE ORAL at 08:05

## 2021-03-30 RX ADMIN — GABAPENTIN 100 MG: 100 CAPSULE ORAL at 22:30

## 2021-03-30 ASSESSMENT — PAIN DESCRIPTION - LOCATION
LOCATION: HIP
LOCATION: HIP

## 2021-03-30 ASSESSMENT — PAIN DESCRIPTION - FREQUENCY: FREQUENCY: INTERMITTENT

## 2021-03-30 ASSESSMENT — PAIN SCALES - GENERAL
PAINLEVEL_OUTOF10: 4
PAINLEVEL_OUTOF10: 3

## 2021-03-30 ASSESSMENT — PAIN DESCRIPTION - PAIN TYPE: TYPE: ACUTE PAIN

## 2021-03-30 ASSESSMENT — PAIN DESCRIPTION - ORIENTATION: ORIENTATION: RIGHT

## 2021-03-30 ASSESSMENT — PAIN DESCRIPTION - DESCRIPTORS: DESCRIPTORS: THROBBING

## 2021-03-30 NOTE — PROGRESS NOTES
Occupational Therapy  Facility/Department: [facility] 8 REHAB UNIT  Daily Treatment Note  NAME: Renu Babcock  : 1955  MRN: [id number]    Date of Service: 3/30/2021    Discharge Recommendations:  Continue to assess pending progress       Assessment   Performance deficits / Impairments: Decreased high-level IADLs;Decreased functional mobility ; Decreased endurance;Decreased ADL status; Decreased balance;Decreased strength;Decreased coordination;Decreased sensation  Treatment Diagnosis: R Gluteal abscess, recent cervical decompression, MSSA on long-term antibiotics due to recent discitis,, L1-2 discectomy  OT Education: Home Exercise Program;Precautions; ADL Adaptive Strategies;Transfer Training  Activity Tolerance  Activity Tolerance: Patient Tolerated treatment well  Safety Devices  Safety Devices in place: Yes  Type of devices: Call light within reach; Chair alarm in place         Patient Diagnosis(es): There were no encounter diagnoses. has a past medical history of CAD (coronary artery disease), Cellulitis, History of blood transfusion, History of DVT (deep vein thrombosis), History of neutropenia, History of pulmonary fibrosis, Hx of blood clots, Hypercholesterolemia, Hypertension, Intrinsic asthma, Pacemaker, Post op infection, Rheumatoid arteritis ([address] Sinus node dysfunction ([address] Staph aureus infection, Status post placement of implantable loop recorder, Syncope, and Thyroid disease. has a past surgical history that includes Diagnostic Cardiac Cath Lab Procedure; LEEP; Coronary angioplasty with stent (); Pacemaker insertion (2016); back surgery (2018); pr total knee arthroplasty (Right, 2018); pr incis/drain thigh/knee abscess,deep (Right, 10/23/2018); Cardiac catheterization (14  MDL); Cardiac catheterization (1/26/15  MDL); pacemaker placement; joint replacement; and Revision total knee arthroplasty (Right, 2018).     Restrictions  Restrictions/Precautions Restrictions/Precautions: Fall Risk, Weight Bearing  Required Braces or Orthoses?: Yes  Lower Extremity Weight Bearing Restrictions  Right Lower Extremity Weight Bearing: Weight Bearing As Tolerated  Left Lower Extremity Weight Bearing: Weight Bearing As Tolerated  Required Braces or Orthoses  Cervical: c-collar  Spinal: Lumbar Corset  Position Activity Restriction  Spinal Precautions: No Bending, No Lifting, No Twisting  Subjective   General  Chart Reviewed: Orders, Yes  Patient assessed for rehabilitation services?: Yes  Additional Pertinent Hx: 1/14 torn R gluteal repair,2/18 pacemaker interrogation workup--no abnormalities, 2/9 R gluteal wound debridement with wound vac initiation, psoas abscess--LTAC admit soon after, 3/3 and underwent cervical corpectomy and decompression, returned to LTAC on 3/6,L1-L2 discectomy on 3/23/21  Pain Assessment  Pain Assessment: 0-10  Pain Type: Acute pain  Pain Location: Hip  Pain Orientation: Right  Pain Descriptors: Throbbing  Pain Frequency: Intermittent  Vital Signs  Patient Currently in Pain: Yes   Objective    Balance  Standing Balance: Minimal assistance(at grab bar)  Toilet Transfers  Toilet - Technique: Stand step  Equipment Used: Grab bars  Toilet Transfer:  Moderate assistance           Type of ROM/Therapeutic Exercise  Type of ROM/Therapeutic Exercise: Free weights  Comment: 1#; 2 sets of 10 in all planes       Plan   Plan  Current Treatment Recommendations: Self-Care / ADL, Safety Education & Training, Pain Management, Endurance Training, Functional Mobility Training, Strengthening, Patient/Caregiver Education & Training, Home Management Training, Equipment Evaluation, Education, & procurement, Balance Training, Positioning       OutComes Score       03/30/21 1000   [address] assistance   Comment with one hand on grab bar at all times   CARE Score 3      03/30/21 1000   Toilet [address] assistance   Comment Mod A, w/c<> toilet using GB   CARE Score 3       Goals  Short term goals  Time Frame for Short term goals: 1 week  Short term goal 1: pt wll complete UB dressing with min A  Short term goal 2: pt will complete LB dressing with  min A  Short term goal 3: pt will complete overall bathing with min A  Short term goal 4: pt will complete simple ambulatory home making task with min A  Short term goal 5: pt will don/doff back brace with independence  Short term goal 6: pt will complete 1-2 handed standing task for 3 mins with CGA  Short term goal 7: pt will complete R FM/sensory acts to improve function of hand during ADL task  Short term goal 8: Pt will increase B  strength by 2#. Short term goal 9: Pt will decrease B 9 hole peg test time by 2 seconds.   Long term goals  Time Frame for Long term goals : 2 weeks  Long term goal 1: complete overall dressing with supervision  Long term goal 2: complete overall bathing with supervision  Long term goal 3: complete overall toileting with supervision  Long term goal 4: complete simple ambulatory home making task with supervision  Long term goal 5: complete HEP with independence       Therapy Time   Individual Concurrent Group Co-treatment   Time In 1000         Time Out 1100         Minutes 60         Timed Code Treatment Minutes: [address], OT

## 2021-03-30 NOTE — PLAN OF CARE
Nutrition Problem #1: Inadequate oral intake  Intervention: Food and/or Nutrient Delivery: Continue Current Diet, Start Oral Nutrition Supplement  Nutritional Goals: PO intake >50%, wt stable    Problem: Nutrition  Goal: Optimal nutrition therapy  3/30/2021 0047 by Krys Mtz RN  Outcome: Ongoing

## 2021-03-30 NOTE — PROGRESS NOTES
03/30/21 0900   Restrictions/Precautions   Restrictions/Precautions Fall Risk;Weight Bearing   Required Braces or Orthoses? Yes   Lower Extremity Weight Bearing Restrictions   Right Lower Extremity Weight Bearing Weight Bearing As Tolerated   Left Lower Extremity Weight Bearing Weight Bearing As Tolerated   Required Braces or Orthoses   Cervical c-collar   Spinal Lumbar Corset   Position Activity Restriction   Spinal Precautions No Bending; No Lifting; No Twisting   Pain Assessment   Pain Assessment 0-10   Pain Level 0   Patient's Stated Pain Goal No pain   Bed Mobility   Rolling Minimal assistance  (TO THE RIGHT )   Transfers   Sit to Stand Contact guard assistance;Minimal Assistance   Stand to sit Contact guard assistance;Minimal Assistance   Stand Pivot Transfers Contact guard assistance  (RW)   Ambulation 1   Surface level tile   Device Parallel Bars   Other Apparatus Wheelchair follow   Assistance Contact guard assistance;Minimal assistance   Quality of Gait STEP TO PATTERN. DECREASED STEP LENGTH    Gait Deviations Slow Vania;Decreased step length   Distance 2X12 FT    Comments DECREASED STANCE TIME ON R DURING FIRST BOUT OF AMBULATION. VCS TO TAKE LARGER STEPS. WITH SECOND BOUT OF AMBULATION, VCS FOR PATIENT TO PLACE MORE WEIGHT THROUGH RUE TO ASSIST WITH LLE ADVANCEMENT AND TO INCREASE STANCE TIME    Balance   Sitting - Static Good   Sitting - Dynamic Good   Comments DON/DOFF SHIRT IN SITTING EOB    Other exercises   Other exercises 1 STS X5 EOB TO DON/DOFF PANTS    Conditions Requiring Skilled Therapeutic Intervention   Body structures, Functions, Activity limitations Decreased functional mobility ; Decreased ROM; Decreased strength;Decreased endurance;Decreased balance;Decreased fine motor control;Decreased coordination; Increased pain   Assessment PT DEMONSTRATES DECREASED R STANCE TIME DUE TO PAIN IN RIGHT LEG.  REQUIRES VCS AND MANUAL ASSIST TO PLACE MORE WEIGHT/FORCE THROUGH RUE TO ASSIST WITH LLE ADVANCEMENT AND INCREASE STANCE TIME. IMPROVED ABILITY TO PERFORM STS TO DON/DOFF PANTS WITH PATIENT PROVIDING SOME ASSISTANCE WITH DON/DOFFING PANTS.     Activity Tolerance   Activity Tolerance Patient Tolerated treatment well   Safety Devices   Type of devices All fall risk precautions in place;Gait belt;Left in chair  (WITH OT )

## 2021-03-30 NOTE — PROGRESS NOTES
bacteremia, she underwent ALFREDA which did not reveal any valvular vegetations. Due to her need for continued IV antibiotic therapy, wound care and rehabilitation, she transferred to the [facility]. She was seen in consultation by our wound care team and was followed by ID. She developed increased complaints of back pain and imaging revealed a right psoas abscess as well as possible L1-2 osteomyelitis discitis. Inflammatory markers were also elevated. The patient had also complained of increased weakness and incoordination to bilateral upper extremities. Neurosurgery was consulted and recommended cervical decompression as well as continued antibiotic therapy with plans for extension of lumbar fusion. She transferred to [facility] on 3/3 and underwent cervical corpectomy and decompression. She tolerated the procedure without difficulty and returned to our facility on 3/6. The patient was initially progressing well with therapy upon her return, but she developed increased right hip pain impeding further progress. Multiple regimen adjustments were undertaken without relief. She continued on antibiotic treatment under the direction of ID and continued wound care under the direction of our wound care team. She developed increased edema to BLE requiring gentle diuresis. Orthopedic surgery was consulted to evaluate the hip who felt it was more likely related to the persistent psoas abscess noted on imaging. Originally, the patient was scheduled for repeat drainage of the abscess on 3/19, but she was going to require sedation and had eaten breakfast. The procedure was then rescheduled for 3/22. Neurosurgery then did an L1-L2 discectomy on 3/23/21 for ongoing discitis osteomyelitis in that region that has thus far not responded to antibiotic therapy. Following I and D of psoas abscess, the patient was discharge from the [facility] to [facility] for planned neurosurgical procedure. Pt tolerated procedure well. She was fitted for a LSO brace.   Evangelist Edwards consulted for antibiotic recommendations. No complaints this morning. Now has wound VAC in right leg  REVIEW OF SYSTEMS:  Constitutional: No fevers No chills  Neck:No stiffness  Respiratory: No shortness of breath  Cardiovascular: No chest pain No palpitations  Gastrointestinal: No abdominal pain    Genitourinary: No Dysuria  Neurological: No headache, no confusion      PHYSICAL EXAM:  /65   Pulse 90   Temp 98.1 °F (36.7 °C) (Temporal)   Resp 18   Ht 5' 6\" (1.676 m)   Wt 197 lb 0.9 oz (89.4 kg)   SpO2 90%   BMI 31.81 kg/m²     Constitutional: she appears well-developed and well-nourished. Eyes  conjunctiva normal.  Pupils react to light  Ear, nose, throat -hearing intact to voice. No scars, masses, or lesions over external nose or ears, no atrophy of tongue  Neck-symmetric, no masses noted, no jugular vein distension  Respiration- chest wall appears symmetric, good expansion,   normal effort without use of accessory muscles  Cardiovascular- RRR  Musculoskeletal  no significant wasting of muscles noted, no bony deformities, gait no gross ataxia  Extremities-no clubbing, cyanosis or edema  Skin  warm, dry, and intact. No rash, erythema, or pallor. Psychiatric  mood, affect, and behavior appear normal.      Neurology  NEUROLOGICAL EXAM:  Awake, alert, fluent oriented x 3 appropriate affect  Attention and concentration appear appropriate  Recent and remote memory appears unremarkable  Speech normal without dysarthria  No clear issues with language of fund of knowledge     Cranial Nerve Exam   CN II- Visual fields grossly unremarkable  CN III, IV,VI-EOMI, No nystagmus, conjugate eye movements, no ptosis  CN VII-no facial assymetry  CN VIII-Hearing intact        Motor Exam  RA deformities noted in the hands.   There is mild generalized weakness and right hip weakness which is partly antalgic.           Tremors- no tremors in hands or head noted     Gait  Not tested      Nursing/pcp notes, imaging,labs and vitals reviewed. PT,OT and/or speech notes reviewed    Lab Results   Component Value Date    WBC 6.2 03/29/2021    HGB 9.6 (L) 03/29/2021    HCT 31.8 (L) 03/29/2021    MCV 92.2 03/29/2021     03/29/2021     Lab Results   Component Value Date     03/29/2021    K 3.1 (L) 03/29/2021    CL 96 (L) 03/29/2021    CO2 30 (H) 03/29/2021    BUN 7 (L) 03/29/2021    CREATININE 0.4 (L) 03/29/2021    GLUCOSE 82 03/29/2021    CALCIUM 8.2 (L) 03/29/2021    PROT 5.4 (L) 03/29/2021    LABALBU 2.5 (L) 03/29/2021    BILITOT 0.5 03/29/2021    ALKPHOS 102 03/29/2021    AST 11 03/29/2021    ALT <5 (A) 03/29/2021    LABGLOM >60 03/29/2021    GFRAA >59 03/29/2021     Lab Results   Component Value Date    INR 1.20 (H) 12/05/2018    INR 1.14 09/04/2018    INR 1.08 07/10/2018     Lab Results   Component Value Date    CRP 13.94 (H) 03/29/2021 03/29/21 1345   Restrictions/Precautions   Restrictions/Precautions Fall Risk;Weight Bearing   Required Braces or Orthoses? Yes   Lower Extremity Weight Bearing Restrictions   Right Lower Extremity Weight Bearing Weight Bearing As Tolerated   Left Lower Extremity Weight Bearing Weight Bearing As Tolerated   Required Braces or Orthoses   Cervical c-collar   Spinal Lumbar Corset   Position Activity Restriction   Spinal Precautions No Bending; No Lifting; No Twisting   Pain Assessment   Pain Assessment 0-10   Pain Level 0  (None stated)   Bed Mobility   Rolling Minimal assistance;Rolling Left   Supine to Sit Moderate assistance   Sit to Supine Minimal assistance  (Reverse log roll)   Comment log roll   Transfers   Sit to Stand Moderate Assistance   Stand to sit Minimal Assistance   Comment BUE on w/c going sit to stand. Cuing to use UE going stand to sit. Exercises   Comments LAQ 1x8   Conditions Requiring Skilled Therapeutic Intervention   Assessment Pt. min A x1 to mod Ax1 with bed mobility and transfers. Pt. taken back to room early to have wound examined by surgeon. Activity Tolerance   Activity Tolerance Patient Tolerated treatment well   Safety Devices   Type of devices All fall risk precautions in place; Bed alarm in place;Call light within reach; Patient at risk for falls; Left in bed                  Objective    Communication Interventions  Communication: Yes  Writing: Pt filled out menu with Stephen Gary (+) coordination. Coordination  Fine Motor: Multiple FM tasks working on pincer grasp with Fair coordination, unable to complete black clothespins  Left Hand Strength -  (lbs)  Handle Setting 2: 13.43  Left 9-Hole Peg Test  Left 9-Hole Peg Test: Impaired  Right Hand Strength -  (lbs)  Handle Setting 2: 12.03  Right 9-Hole Peg Test  Right 9-Hole Peg Test: Not Tested  Fine Motor Skills  Left 9-Hole Peg Test: Impaired  Left 9 Hole Peg Test Time (secs): 67  Right 9-Hole Peg Test: Not Tested  Right 9 Hole Peg Test Time (secs): 80        RECORD REVIEW: Previous medical records, medications were reviewed at today's visit    IMPRESSION:   1. Right psoas abscess and L1/2 discitis/osteomyelitis with generalized weakness and deconditioning. Continue Ancef for another 8 to 12 weeks most likely. PT/OT for generalized weakness and deconditioning. Continue wound VAC for right gluteal tear/repair  2. GIcontinue with medications for constipation  3. DVT prophylaxissubcu heparin  4. Rheumatoid arthritislow-dose prednisone  5. Coronary artery diseasecontinue Imdur  6.   Hypomagnesemia/hypokalemiareplacement ordered  Appreciate ID assistance  Add 300 mg gabapentin at bedtime to help her get through the night  Staff tomorrow

## 2021-03-30 NOTE — PROGRESS NOTES
03/30/21 1515   Restrictions/Precautions   Restrictions/Precautions Fall Risk;Weight Bearing   Lower Extremity Weight Bearing Restrictions   Right Lower Extremity Weight Bearing Weight Bearing As Tolerated   Left Lower Extremity Weight Bearing Weight Bearing As Tolerated   Required Braces or Orthoses   Cervical c-collar   Spinal Lumbar Corset   Position Activity Restriction   Spinal Precautions No Bending; No Lifting; No Twisting   Pain Assessment   Pain Assessment 0-10   Pain Level 3   Patient's Stated Pain Goal No pain   Pain Type Acute pain   Pain Location Hip   Pain Orientation Right   Pain Descriptors Aching; Sore   Pain Frequency Intermittent   Pain Onset Gradual   Clinical Progression Not changed   Functional Pain Assessment Activities are not prevented   Non-Pharmaceutical Pain Intervention(s) Distraction   Response to Pain Intervention Patient Satisfied   Transfers   Sit to Stand Contact guard assistance;Minimal Assistance  (pull to  parallel bars )   Stand to sit Contact guard assistance   Stand Pivot Transfers Contact guard assistance  (RW)   Ambulation 1   Surface level tile   Device Parallel Bars   Other Apparatus Wheelchair follow   Assistance Contact guard assistance;Minimal assistance   Quality of Gait STEP TO PATTERN. DECREASED STEP LENGTH DUE TO FATIGUE    Gait Deviations Slow Vania;Decreased step length   Distance 5 FT   Comments REQUIRES VCS TO PUSH THROUGH BUE TO ASSIST IN ADVANCING LLE. FATIGUED MORE EASILY THIS AFTERNOON DUE TO FATIGUE AND PAIN IN RIGHT HIP    Other exercises   Other exercises 1 SEATED BLE KNEE EXT 2X10   Other exercises 2 SEATED HIP ADDUCTION 2X15   Other exercises 3 SEATED DF/PF X15  (MIN RESISTANCE )   Conditions Requiring Skilled Therapeutic Intervention   Body structures, Functions, Activity limitations Decreased functional mobility ; Decreased ROM; Decreased strength;Decreased endurance;Decreased balance;Decreased fine motor control;Decreased coordination; Increased pain   Assessment PT WAS MORE FATIGUED THIS AFTERNOON DURING BOUT OF AMBULATION AS EVIDENCED BY DIFFCULTY TO ADVANCE BLE. REQUIRED VCS TO PLACE WEIGHT THROUGH BUE TO ADVANCE LEGS. WILL REQUIRE SKILLED PT TO IMPROVE AMBULATION PATTERN AND INCREASE STRENGTH    Activity Tolerance   Activity Tolerance Patient Tolerated treatment well;Patient limited by fatigue;Patient limited by pain   Safety Devices   Type of devices All fall risk precautions in place;Call light within reach; Left in chair;Chair alarm in place

## 2021-03-30 NOTE — PROGRESS NOTES
Occupational Therapy  Facility/Department: [facility] 8 REHAB UNIT  Daily Treatment Note  NAME: Kortney Horn  : 1955  MRN: [id number]    Date of Service: 3/30/2021    Discharge Recommendations:  Continue to assess pending progress       Assessment   Performance deficits / Impairments: Decreased high-level IADLs;Decreased functional mobility ; Decreased endurance;Decreased ADL status; Decreased balance;Decreased strength;Decreased coordination;Decreased sensation  Treatment Diagnosis: R Gluteal abscess, recent cervical decompression, MSSA on long-term antibiotics due to recent discitis,, L1-2 discectomy  OT Education: Home Exercise Program;Precautions; ADL Adaptive Strategies;Transfer Training  Activity Tolerance  Activity Tolerance: Patient Tolerated treatment well  Safety Devices  Safety Devices in place: Yes  Type of devices: Call light within reach; Chair alarm in place         Patient Diagnosis(es): There were no encounter diagnoses. has a past medical history of CAD (coronary artery disease), Cellulitis, History of blood transfusion, History of DVT (deep vein thrombosis), History of neutropenia, History of pulmonary fibrosis, Hx of blood clots, Hypercholesterolemia, Hypertension, Intrinsic asthma, Pacemaker, Post op infection, Rheumatoid arteritis ([address] Sinus node dysfunction ([address] Staph aureus infection, Status post placement of implantable loop recorder, Syncope, and Thyroid disease. has a past surgical history that includes Diagnostic Cardiac Cath Lab Procedure; LEEP; Coronary angioplasty with stent (); Pacemaker insertion (2016); back surgery (2018); pr total knee arthroplasty (Right, 2018); pr incis/drain thigh/knee abscess,deep (Right, 10/23/2018); Cardiac catheterization (14  MDL); Cardiac catheterization (1/26/15  MDL); pacemaker placement; joint replacement; and Revision total knee arthroplasty (Right, 2018).     Restrictions  Restrictions/Precautions Restrictions/Precautions: Fall Risk, Weight Bearing  Required Braces or Orthoses?: Yes  Lower Extremity Weight Bearing Restrictions  Right Lower Extremity Weight Bearing: Weight Bearing As Tolerated  Left Lower Extremity Weight Bearing: Weight Bearing As Tolerated  Required Braces or Orthoses  Cervical: c-collar  Spinal: Lumbar Corset  Position Activity Restriction  Spinal Precautions: No Bending, No Lifting, No Twisting  Subjective   General  Chart Reviewed: Orders, Yes  Patient assessed for rehabilitation services?: Yes  Additional Pertinent Hx: 1/14 torn R gluteal repair,2/18 pacemaker interrogation workup--no abnormalities, 2/9 R gluteal wound debridement with wound vac initiation, psoas abscess--[facility] admit soon after, 3/3 and underwent cervical corpectomy and decompression, returned to LTAC on 3/6,L1-L2 discectomy on 3/23/21  Pain Assessment  Pain Assessment: 0-10  Pain Type: Acute pain  Pain Location: Hip  Pain Orientation: Right  Pain Descriptors: Throbbing  Pain Frequency: Intermittent  Vital Signs  Patient Currently in Pain: Yes   Objective       Balance  Sitting Balance: Supervision  Standing Balance: Minimal assistance(At GB)  Functional Mobility  Functional - Mobility Device: Wheelchair  Activity: To/from bathroom  Assist Level: Minimal assistance  Toilet Transfers  Toilet - Technique: Stand step  Equipment Used: Grab bars  Toilet Transfer: Moderate assistance     Transfers  Stand Step Transfers: Minimal assistance  Sit to stand:  Moderate assistance  Stand to sit: Minimal assistance  Transfer Comments: w/c>bed using RW        Type of ROM/Therapeutic Exercise  Type of ROM/Therapeutic Exercise: Loan  Comment: 10#; 4 sets of 10       Plan   Plan  Current Treatment Recommendations: Self-Care / ADL, Safety Education & Training, Pain Management, Endurance Training, Functional Mobility Training, Strengthening, Patient/Caregiver Education & Training, Home Management Training, Equipment Evaluation, Education, & procurement, Balance Training, Positioning    OutComes Score       03/30/21 [address] Needed Partial/moderate assistance   Comment Mod A, one hand on GB at all times   CARE Score 3           03/30/21 1345   Toilet Transfer   Assistance Needed Partial/moderate assistance   Comment Mod A, w/c<>toilet using GB   CARE Score 3               Goals  Short term goals  Time Frame for Short term goals: 1 week  Short term goal 1: pt wll complete UB dressing with min A  Short term goal 2: pt will complete LB dressing with  min A  Short term goal 3: pt will complete overall bathing with min A  Short term goal 4: pt will complete simple ambulatory home making task with min A  Short term goal 5: pt will don/doff back brace with independence  Short term goal 6: pt will complete 1-2 handed standing task for 3 mins with CGA  Short term goal 7: pt will complete R FM/sensory acts to improve function of hand during ADL task  Short term goal 8: Pt will increase B  strength by 2#. Short term goal 9: Pt will decrease B 9 hole peg test time by 2 seconds.   Long term goals  Time Frame for Long term goals : 2 weeks  Long term goal 1: complete overall dressing with supervision  Long term goal 2: complete overall bathing with supervision  Long term goal 3: complete overall toileting with supervision  Long term goal 4: complete simple ambulatory home making task with supervision  Long term goal 5: complete HEP with independence       Therapy Time   Individual Concurrent Group Co-treatment   Time In 1345         Time Out 1430         Minutes 45         Timed Code Treatment Minutes: [address] [facility], OT

## 2021-03-30 NOTE — PLAN OF CARE
Goal: STG - Patient uses call light consistently to request assistance with transfers  3/30/2021 0047 by Chaka Hu RN  Outcome: Ongoing  3/29/2021 1609 by Dunia Campos RN  Outcome: Ongoing  Goal: STG - patient uses gait belt during all transfers  3/30/2021 0047 by Chaka Hu RN  Outcome: Ongoing  3/29/2021 1609 by Dunia Campos RN  Outcome: Ongoing     Problem: Pain:  Description: Pain management should include both nonpharmacologic and pharmacologic interventions.   Goal: Pain level will decrease  Description: Pain level will decrease  3/30/2021 0047 by Chaka Hu RN  Outcome: Ongoing  3/29/2021 1609 by Dunia Campos RN  Outcome: Ongoing  Goal: Control of acute pain  Description: Control of acute pain  3/30/2021 0047 by Chaka Hu RN  Outcome: Ongoing  3/29/2021 1609 by Dunia Campos RN  Outcome: Ongoing  Goal: Control of chronic pain  Description: Control of chronic pain  3/30/2021 0047 by Chaka Hu RN  Outcome: Ongoing  3/29/2021 1609 by Dunia Campos RN  Outcome: Ongoing     Problem: SAFETY  Goal: Free from accidental physical injury  3/30/2021 0047 by Chaka Hu RN  Outcome: Ongoing  3/29/2021 1609 by Dunia Campos RN  Outcome: Ongoing  Goal: Free from intentional harm  3/30/2021 0047 by Chaka Hu RN  Outcome: Ongoing  3/29/2021 1609 by Dunia Campos RN  Outcome: Ongoing  Goal: LTG - Patient will demonstrate safety requirements appropriate to situation/environment  3/30/2021 0047 by Chaka Hu RN  Outcome: Ongoing  3/29/2021 1609 by Dunia Campos RN  Outcome: Ongoing  Goal: LTG - patient will utilize safety techniques  3/30/2021 0047 by Chaka Hu RN  Outcome: Ongoing  3/29/2021 1609 by Dunia Campos RN  Outcome: Ongoing  Goal: STG - patient locks brakes on wheelchair  3/30/2021 0047 by Chaka Hu RN  Outcome: Ongoing  3/29/2021 1609 by Dunia Campos RN  Outcome: Ongoing  Goal: STG - Patient uses call light

## 2021-03-31 ENCOUNTER — APPOINTMENT (OUTPATIENT)
Dept: GENERAL RADIOLOGY | Age: 66
DRG: 948 | End: 2021-03-31
Attending: PSYCHIATRY & NEUROLOGY
Payer: MEDICARE

## 2021-03-31 PROCEDURE — 74230 X-RAY XM SWLNG FUNCJ C+: CPT

## 2021-03-31 PROCEDURE — 99233 SBSQ HOSP IP/OBS HIGH 50: CPT | Performed by: PSYCHIATRY & NEUROLOGY

## 2021-03-31 PROCEDURE — 2580000003 HC RX 258: Performed by: PSYCHIATRY & NEUROLOGY

## 2021-03-31 PROCEDURE — 97116 GAIT TRAINING THERAPY: CPT

## 2021-03-31 PROCEDURE — 92611 MOTION FLUOROSCOPY/SWALLOW: CPT

## 2021-03-31 PROCEDURE — 97110 THERAPEUTIC EXERCISES: CPT

## 2021-03-31 PROCEDURE — 6360000002 HC RX W HCPCS: Performed by: PSYCHIATRY & NEUROLOGY

## 2021-03-31 PROCEDURE — 92526 ORAL FUNCTION THERAPY: CPT

## 2021-03-31 PROCEDURE — 97535 SELF CARE MNGMENT TRAINING: CPT

## 2021-03-31 PROCEDURE — 6370000000 HC RX 637 (ALT 250 FOR IP): Performed by: PSYCHIATRY & NEUROLOGY

## 2021-03-31 PROCEDURE — 1180000000 HC REHAB R&B

## 2021-03-31 PROCEDURE — 92522 EVALUATE SPEECH PRODUCTION: CPT

## 2021-03-31 PROCEDURE — 92610 EVALUATE SWALLOWING FUNCTION: CPT

## 2021-03-31 PROCEDURE — 97606 NEG PRS WND THER DME>50 SQCM: CPT

## 2021-03-31 RX ORDER — PREDNISONE 1 MG/1
5 TABLET ORAL DAILY
Status: DISCONTINUED | OUTPATIENT
Start: 2021-03-31 | End: 2021-04-01

## 2021-03-31 RX ADMIN — OXYCODONE 15 MG: 5 TABLET ORAL at 21:25

## 2021-03-31 RX ADMIN — GABAPENTIN 300 MG: 300 CAPSULE ORAL at 21:26

## 2021-03-31 RX ADMIN — CARVEDILOL 25 MG: 25 TABLET, FILM COATED ORAL at 09:04

## 2021-03-31 RX ADMIN — OXYCODONE 15 MG: 5 TABLET ORAL at 12:11

## 2021-03-31 RX ADMIN — PANTOPRAZOLE SODIUM 40 MG: 40 TABLET, DELAYED RELEASE ORAL at 09:03

## 2021-03-31 RX ADMIN — ISOSORBIDE MONONITRATE 60 MG: 60 TABLET, EXTENDED RELEASE ORAL at 09:03

## 2021-03-31 RX ADMIN — ACYCLOVIR 400 MG: 200 CAPSULE ORAL at 21:25

## 2021-03-31 RX ADMIN — Medication 1 TABLET: at 09:03

## 2021-03-31 RX ADMIN — POLYETHYLENE GLYCOL 3350 17 G: 17 POWDER, FOR SOLUTION ORAL at 09:03

## 2021-03-31 RX ADMIN — WATER 2000 MG: 1 INJECTION INTRAMUSCULAR; INTRAVENOUS; SUBCUTANEOUS at 19:49

## 2021-03-31 RX ADMIN — CARVEDILOL 25 MG: 25 TABLET, FILM COATED ORAL at 16:20

## 2021-03-31 RX ADMIN — SODIUM CHLORIDE, PRESERVATIVE FREE 10 ML: 5 INJECTION INTRAVENOUS at 09:30

## 2021-03-31 RX ADMIN — HEPARIN SODIUM 5000 UNITS: 5000 INJECTION INTRAVENOUS; SUBCUTANEOUS at 09:01

## 2021-03-31 RX ADMIN — TRAMADOL HYDROCHLORIDE 50 MG: 50 TABLET, FILM COATED ORAL at 09:03

## 2021-03-31 RX ADMIN — GUAIFENESIN 1200 MG: 600 TABLET, EXTENDED RELEASE ORAL at 09:04

## 2021-03-31 RX ADMIN — OXYCODONE 15 MG: 5 TABLET ORAL at 09:06

## 2021-03-31 RX ADMIN — MAGNESIUM OXIDE TAB 400 MG (240 MG ELEMENTAL MG) 400 MG: 400 (240 MG) TAB at 09:03

## 2021-03-31 RX ADMIN — DOCUSATE SODIUM 50 MG AND SENNOSIDES 8.6 MG 2 TABLET: 8.6; 5 TABLET, FILM COATED ORAL at 09:07

## 2021-03-31 RX ADMIN — ACYCLOVIR 400 MG: 200 CAPSULE ORAL at 09:03

## 2021-03-31 RX ADMIN — DOCUSATE SODIUM 50 MG AND SENNOSIDES 8.6 MG 2 TABLET: 8.6; 5 TABLET, FILM COATED ORAL at 16:20

## 2021-03-31 RX ADMIN — ISOSORBIDE MONONITRATE 60 MG: 60 TABLET, EXTENDED RELEASE ORAL at 21:26

## 2021-03-31 RX ADMIN — SODIUM CHLORIDE, PRESERVATIVE FREE 10 ML: 5 INJECTION INTRAVENOUS at 21:27

## 2021-03-31 RX ADMIN — PREDNISONE 5 MG: 5 TABLET ORAL at 09:03

## 2021-03-31 RX ADMIN — WATER 2000 MG: 1 INJECTION INTRAMUSCULAR; INTRAVENOUS; SUBCUTANEOUS at 02:52

## 2021-03-31 RX ADMIN — GUAIFENESIN 1200 MG: 600 TABLET, EXTENDED RELEASE ORAL at 21:26

## 2021-03-31 RX ADMIN — TRAMADOL HYDROCHLORIDE 50 MG: 50 TABLET, FILM COATED ORAL at 21:26

## 2021-03-31 RX ADMIN — DOCUSATE SODIUM 100 MG: 100 CAPSULE, LIQUID FILLED ORAL at 21:26

## 2021-03-31 RX ADMIN — DOCUSATE SODIUM 100 MG: 100 CAPSULE, LIQUID FILLED ORAL at 09:07

## 2021-03-31 RX ADMIN — GABAPENTIN 100 MG: 100 CAPSULE ORAL at 09:08

## 2021-03-31 RX ADMIN — NALOXEGOL OXALATE 25 MG: 12.5 TABLET, FILM COATED ORAL at 09:03

## 2021-03-31 RX ADMIN — OXYCODONE 15 MG: 5 TABLET ORAL at 16:20

## 2021-03-31 RX ADMIN — MAGNESIUM HYDROXIDE 30 ML: 400 SUSPENSION ORAL at 21:25

## 2021-03-31 RX ADMIN — CYCLOBENZAPRINE 10 MG: 10 TABLET, FILM COATED ORAL at 19:39

## 2021-03-31 RX ADMIN — LEVOTHYROXINE SODIUM 75 MCG: 75 TABLET ORAL at 06:14

## 2021-03-31 RX ADMIN — HEPARIN SODIUM 5000 UNITS: 5000 INJECTION INTRAVENOUS; SUBCUTANEOUS at 21:27

## 2021-03-31 RX ADMIN — WATER 2000 MG: 1 INJECTION INTRAMUSCULAR; INTRAVENOUS; SUBCUTANEOUS at 12:11

## 2021-03-31 RX ADMIN — POTASSIUM CHLORIDE 20 MEQ: 1500 TABLET, EXTENDED RELEASE ORAL at 09:09

## 2021-03-31 RX ADMIN — GABAPENTIN 100 MG: 100 CAPSULE ORAL at 21:25

## 2021-03-31 ASSESSMENT — PAIN SCALES - GENERAL
PAINLEVEL_OUTOF10: 4
PAINLEVEL_OUTOF10: 0
PAINLEVEL_OUTOF10: 6

## 2021-03-31 ASSESSMENT — PAIN DESCRIPTION - DESCRIPTORS
DESCRIPTORS: DISCOMFORT
DESCRIPTORS: DISCOMFORT

## 2021-03-31 ASSESSMENT — PAIN DESCRIPTION - FREQUENCY: FREQUENCY: CONTINUOUS

## 2021-03-31 ASSESSMENT — PAIN DESCRIPTION - ORIENTATION: ORIENTATION: LOWER

## 2021-03-31 ASSESSMENT — PAIN - FUNCTIONAL ASSESSMENT
PAIN_FUNCTIONAL_ASSESSMENT: ACTIVITIES ARE NOT PREVENTED
PAIN_FUNCTIONAL_ASSESSMENT: ACTIVITIES ARE NOT PREVENTED

## 2021-03-31 ASSESSMENT — PAIN DESCRIPTION - LOCATION: LOCATION: BACK

## 2021-03-31 NOTE — PROGRESS NOTES
Patient:   Carmita Otoole  MR#:    [id number]   Room:    0820/820-01   YOB: 1955  Date of Progress Note: 3/31/2021  Time of Note                           8:17 AM  Consulting Physician:   Jake Ann M.D. Attending Physician:  Jake Ann MD     CHIEF COMPLAINT: Generalized weakness and deconditioning     Subjective  This 77 y.o. female  admitted to [facility] initially on 1/14/2021. The patient had been in her usual state of health when she developed a torn right gluteal muscle. She underwent surgical repair on 1/14/21 and post-operative course was progressing. At her 4 week follow up, she developed increased bloody drainage from the wound with erythema. She presented for injection due to RA at which time she suffered a syncopal event. This was felt to be a vasovagal response and she returned to home. She was found later by a family member in the floor for an unknown length of time and the dressing to the right hip/gluteal wound was saturated. She presented to the hospital 2/8 and was noted to be febrile on intake. Workup revealed an elevated CPK and d dimer. Wound cultures were obtained. The patient was admitted to the service of the hospitalists at that time. She underwent pacemaker interrogation in workup for her syncopal episodes which detected no abnormalities. MRI brain negative for acute process. Purulent drainage from the wound was cultured and the patient was started on broad spectrum antibiotics. She was seen in consultation by orthopedics. Blood cultures returned positive for MSSA in all bottles. ID was consulted for antibiotic management at that time. She underwent debridement of the wound per ortho on 2/9. Post-operatively, the wound measures 20 cm x 6 cm and wound vac therapy was initiated. Surveillance cultures remained positive and surgical wound cultures positive for e. Coli. She continued with antibiotic therapy with Azactam under direction of ID.  Due to persistent bacteremia, she underwent ALFREDA which did not reveal any valvular vegetations. Due to her need for continued IV antibiotic therapy, wound care and rehabilitation, she transferred to the [facility]. She was seen in consultation by our wound care team and was followed by ID. She developed increased complaints of back pain and imaging revealed a right psoas abscess as well as possible L1-2 osteomyelitis discitis. Inflammatory markers were also elevated. The patient had also complained of increased weakness and incoordination to bilateral upper extremities. Neurosurgery was consulted and recommended cervical decompression as well as continued antibiotic therapy with plans for extension of lumbar fusion. She transferred to [facility] on 3/3 and underwent cervical corpectomy and decompression. She tolerated the procedure without difficulty and returned to our facility on 3/6. The patient was initially progressing well with therapy upon her return, but she developed increased right hip pain impeding further progress. Multiple regimen adjustments were undertaken without relief. She continued on antibiotic treatment under the direction of ID and continued wound care under the direction of our wound care team. She developed increased edema to BLE requiring gentle diuresis. Orthopedic surgery was consulted to evaluate the hip who felt it was more likely related to the persistent psoas abscess noted on imaging. Originally, the patient was scheduled for repeat drainage of the abscess on 3/19, but she was going to require sedation and had eaten breakfast. The procedure was then rescheduled for 3/22. Neurosurgery then did an L1-L2 discectomy on 3/23/21 for ongoing discitis osteomyelitis in that region that has thus far not responded to antibiotic therapy. Following I and D of psoas abscess, the patient was discharge from the [facility] to [facility] for planned neurosurgical procedure. Pt tolerated procedure well. She was fitted for a LSO brace.   Tiffanie Gutierrez consulted for antibiotic recommendations. No complaints today  REVIEW OF SYSTEMS:  Constitutional: No fevers No chills  Neck:No stiffness  Respiratory: No shortness of breath  Cardiovascular: No chest pain No palpitations  Gastrointestinal: No abdominal pain    Genitourinary: No Dysuria  Neurological: No headache, no confusion      PHYSICAL EXAM:  BP (!) 145/72   Pulse 78   Temp 96.7 °F (35.9 °C) (Temporal)   Resp 16   Ht 5' 6\" (1.676 m)   Wt 197 lb 0.9 oz (89.4 kg)   SpO2 90%   BMI 31.81 kg/m²     Constitutional: she appears well-developed and well-nourished. Eyes  conjunctiva normal.  Pupils react to light  Ear, nose, throat -hearing intact to voice. No scars, masses, or lesions over external nose or ears, no atrophy of tongue  Neck-symmetric, no masses noted, no jugular vein distension  Respiration- chest wall appears symmetric, good expansion,   normal effort without use of accessory muscles  Cardiovascular- RRR  Musculoskeletal  no significant wasting of muscles noted, no bony deformities, gait no gross ataxia  Extremities-no clubbing, cyanosis or edema  Skin  warm, dry, and intact. No rash, erythema, or pallor. Psychiatric  mood, affect, and behavior appear normal.      Neurology  NEUROLOGICAL EXAM:  Awake, alert, fluent oriented x 3 appropriate affect  Attention and concentration appear appropriate  Recent and remote memory appears unremarkable  Speech normal without dysarthria  No clear issues with language of fund of knowledge     Cranial Nerve Exam   CN II- Visual fields grossly unremarkable  CN III, IV,VI-EOMI, No nystagmus, conjugate eye movements, no ptosis  CN VII-no facial assymetry  CN VIII-Hearing intact        Motor Exam  RA deformities noted in the hands. There is mild generalized weakness and right hip weakness which is partly antalgic.           Tremors- no tremors in hands or head noted     Gait  Not tested      Nursing/pcp notes, imaging,labs and vitals reviewed. PT,OT and/or speech notes reviewed    Lab Results   Component Value Date    WBC 6.2 03/29/2021    HGB 9.6 (L) 03/29/2021    HCT 31.8 (L) 03/29/2021    MCV 92.2 03/29/2021     03/29/2021     Lab Results   Component Value Date     03/29/2021    K 3.1 (L) 03/29/2021    CL 96 (L) 03/29/2021    CO2 30 (H) 03/29/2021    BUN 7 (L) 03/29/2021    CREATININE 0.4 (L) 03/29/2021    GLUCOSE 82 03/29/2021    CALCIUM 8.2 (L) 03/29/2021    PROT 5.4 (L) 03/29/2021    LABALBU 2.5 (L) 03/29/2021    BILITOT 0.5 03/29/2021    ALKPHOS 102 03/29/2021    AST 11 03/29/2021    ALT <5 (A) 03/29/2021    LABGLOM >60 03/29/2021    GFRAA >59 03/29/2021     Lab Results   Component Value Date    INR 1.20 (H) 12/05/2018    INR 1.14 09/04/2018    INR 1.08 07/10/2018     Lab Results   Component Value Date    CRP 13.94 (H) 03/29/2021     PHYSICAL THERAPY  STRENGTH  Strength RLE  Comment: 2-/5 HIP, 3/5 KNEE, 3+/5 ANKLE  ROM  AROM RLE (degrees)  RLE General AROM: DECREASED AT HIP  AROM LLE (degrees)  LLE AROM : WFL  BED MOBILITY  Bed Mobility  Rolling: Minimal assistance(TO THE RIGHT )  Supine to Sit: Moderate assistance  Sit to Supine: Minimal assistance(Reverse log roll)  Scooting: Contact guard assistance  Comment: log roll  TRANSFERS  Transfers  Sit to Stand: Contact guard assistance, Minimal Assistance(pull to  parallel bars )  Stand to sit: Contact guard assistance  Bed to Chair: Minimal assistance, Contact guard assistance  Stand Pivot Transfers: Contact guard assistance(RW)  Comment: BUE on w/c going sit to stand. Cuing to use UE going stand to sit.   WHEELCHAIR PROPULSION  Propulsion 1  Propulsion: Manual  Level: Level Tile  Method: RUBINA FITZPATRICK  Level of Assistance: Stand by assistance, Contact guard assistance  Description/ Details: 2 TURNS  Distance: 50  AMBULATION  Ambulation 1  Surface: level tile  Device: Parallel Bars  Other Apparatus: Wheelchair follow  Assistance: Contact guard assistance, Minimal assistance  Quality of Gait: STEP TO PATTERN. DECREASED STEP LENGTH DUE TO FATIGUE   Gait Deviations: Slow Vania, Decreased step length  Distance: 5 FT  Comments: REQUIRES VCS TO PUSH THROUGH BUE TO ASSIST IN ADVANCING LLE. FATIGUED MORE EASILY THIS AFTERNOON DUE TO FATIGUE AND PAIN IN RIGHT HIP   STAIRS  GOALS:  Short term goals  Time Frame for Short term goals: 1 WEEK  Short term goal 1: CGA FOR ON BED MOBILITY  Short term goal 2: CGA FOR TRANSFERS  Short term goal 3: CGA AMB 50 FEET WITH AD  Short term goal 4: INDEP W/C PROPEL 50 FEET  Short term goal 5: CGA CAR TRANSFER     Long term goals  Time Frame for Long term goals : 2 WEEKS  Long term goal 1: INDEP ON BED AND TRANSFERS  Long term goal 2: INDPE  FEET WITH AD  Long term goal 3: INDPE W/C PROPEL 150 FEET  Long term goal 4: INDEP CAR TRANSFER  Long term goal 5: INDEP 4 STEP      ASSESSMENT:  Assessment: PT WAS MORE FATIGUED THIS AFTERNOON DURING BOUT OF AMBULATION AS EVIDENCED BY DIFFCULTY TO ADVANCE BLE. REQUIRED VCS TO PLACE WEIGHT THROUGH BUE TO ADVANCE LEGS. WILL REQUIRE SKILLED PT TO IMPROVE AMBULATION PATTERN AND INCREASE STRENGTH         SPEECH THERAPY        OCCUPATIONAL THERAPY     CURRENT IRF-CAT SCORES  Eating: CARE Score: 4     Oral Hygiene: CARE Score: 4         Toileting: CARE Score: 3  Comment: Mod A, one hand on GB at all times      Shower/Bathe: CARE Score: 3           Upper Body Dressing: CARE Score: 3          Lower Body Dressing: CARE Score: 2           Footwear: CARE Score: 2           Toilet Transfers: CARE Score: 3  Comment:  Mod A, w/c<>toilet using GB        Picking Up Object:  CARE Score: 88           UE Functioning:  WFLs     Pain Assessment:  Pain Level: 7  Pain Location: Hip     STGs:  Short term goals  Time Frame for Short term goals: 1 week  Short term goal 1: pt wll complete UB dressing with min A  Short term goal 2: pt will complete LB dressing with  min A  Short term goal 3: pt will complete overall bathing with min A  Short term goal 4: pt will complete simple ambulatory home making task with min A  Short term goal 5: pt will don/doff back brace with independence  Short term goal 6: pt will complete 1-2 handed standing task for 3 mins with CGA  Short term goal 7: pt will complete R FM/sensory acts to improve function of hand during ADL task  Short term goal 8: Pt will increase B  strength by 2#. Short term goal 9: Pt will decrease B 9 hole peg test time by 2 seconds.     LTGs:  Long term goals  Time Frame for Long term goals : 2 weeks  Long term goal 1: complete overall dressing with supervision  Long term goal 2: complete overall bathing with supervision  Long term goal 3: complete overall toileting with supervision  Long term goal 4: complete simple ambulatory home making task with supervision  Long term goal 5: complete HEP with independence     Assessment:  Performance deficits / Impairments: Decreased high-level IADLs, Decreased functional mobility , Decreased endurance, Decreased ADL status, Decreased balance, Decreased strength, Decreased coordination, Decreased sensation                        NUTRITION  Current Wt: Weight: 197 lb 0.9 oz (89.4 kg) / Body mass index is 31.81 kg/m². Admission Wt: Admission Body Weight: 205 lb (93 kg)  Oral Diet Orders: General; Dental Soft  Oral Nutrition Supplement (ONS) Orders:Ramiro and Ensure Enlive BID     Pt declining nutritionally and at risk for nutrition compromise AEB variable po intake and wt loss since admission. Pt with 4% wt loss since admission, some likeyl fluid related, however po intake has decreased as well. Pt now with wound vac, nutritional needs increased. Pt agreeable to Ramiro and Ensure supplementation. Please see nutrition note for details.          RECORD REVIEW: Previous medical records, medications were reviewed at today's visit    IMPRESSION:   1. Right psoas abscess and L1/2 discitis/osteomyelitis with generalized weakness and deconditioning.   Continue Ancef for another 8 to 12 weeks most likely. PT/OT for generalized weakness and deconditioning. Continue wound VAC for right gluteal tear/repair  2. GIcontinue with medications for constipation  3. DVT prophylaxissubcu heparin  4. Rheumatoid arthritislow-dose prednisone  5. Coronary artery diseasecontinue Imdur  6.   Hypomagnesemia/hypokalemiareplacement ordered  Appreciate ID assistance  Added 300 mg gabapentin at bedtime to help her get through the night  Prednisone increased to 5mg  Staffing this date , mutlidisciplinary with entire team with complex decision making and planning for discharge  ELOS:4/12, likely snf

## 2021-03-31 NOTE — PROGRESS NOTES
Occupational Therapy  Facility/Department: [facility] 8 REHAB UNIT  Daily Treatment Note  NAME: Carlo Nelson  : 1955  MRN: [id number]    Date of Service: 3/31/2021    Discharge Recommendations:  Continue to assess pending progress       Assessment   Performance deficits / Impairments: Decreased high-level IADLs;Decreased functional mobility ; Decreased endurance;Decreased ADL status; Decreased balance;Decreased strength;Decreased coordination;Decreased sensation  Treatment Diagnosis: R Gluteal abscess, recent cervical decompression, MSSA on long-term antibiotics due to recent discitis,, L1-2 discectomy  OT Education: Home Exercise Program;Precautions; ADL Adaptive Strategies;Transfer Training  Activity Tolerance  Activity Tolerance: Patient Tolerated treatment well  Safety Devices  Safety Devices in place: Yes(Left with PT)  Type of devices: Left in chair         Patient Diagnosis(es): There were no encounter diagnoses. has a past medical history of CAD (coronary artery disease), Cellulitis, History of blood transfusion, History of DVT (deep vein thrombosis), History of neutropenia, History of pulmonary fibrosis, Hx of blood clots, Hypercholesterolemia, Hypertension, Intrinsic asthma, Pacemaker, Post op infection, Rheumatoid arteritis ([address] Sinus node dysfunction ([address] Staph aureus infection, Status post placement of implantable loop recorder, Syncope, and Thyroid disease. has a past surgical history that includes Diagnostic Cardiac Cath Lab Procedure; LEEP; Coronary angioplasty with stent (); Pacemaker insertion (2016); back surgery (2018); pr total knee arthroplasty (Right, 2018); pr incis/drain thigh/knee abscess,deep (Right, 10/23/2018); Cardiac catheterization (14  MDL); Cardiac catheterization (1/26/15  MDL); pacemaker placement; joint replacement; and Revision total knee arthroplasty (Right, 2018).     Restrictions  Restrictions/Precautions  Restrictions/Precautions: Fall Risk, Weight Bearing  Required Braces or Orthoses?: Yes  Lower Extremity Weight Bearing Restrictions  Right Lower Extremity Weight Bearing: Weight Bearing As Tolerated  Left Lower Extremity Weight Bearing: Weight Bearing As Tolerated  Required Braces or Orthoses  Cervical: c-collar  Spinal: Lumbar Corset  Position Activity Restriction  Spinal Precautions: No Bending, No Lifting, No Twisting       Objective       Shower Transfers  Shower - Transfer From: Wheelchair  Shower - Transfer Type: To and From  Shower - Transfer To: Transfer tub bench  Shower - Technique: To right; To left  Shower Transfers: Minimal assistance     Transfers  Stand Step Transfers: Minimal assistance  Sit to stand: Minimal assistance  Stand to sit: Minimal assistance  Transfer Comments: bed>w/c, stand step           Plan   Plan  Current Treatment Recommendations: Self-Care / ADL, Safety Education & Training, Pain Management, Endurance Training, Functional Mobility Training, Strengthening, Patient/Caregiver Education & Training, Home Management Training, Equipment Evaluation, Education, & procurement, Balance Training, Positioning  OutComes Score       03/31/21 1000   [id number] Needed Substantial/maximal assistance   Comment Mod A for pullover shirt, Max A with c-collar   CARE Score 2   Shower/Bathe Self   Assistance Needed Partial/moderate assistance   Comment Mod A, shower   CARE Score 3   Upper Body Dressing   Assistance Needed Partial/moderate assistance   Comment Min A   CARE Score 3   Lower Body Dressing   Assistance Needed Substantial/maximal assistance   Comment Will need to assess AE   CARE Score 2            03/31/21 1000   Toilet Transfer   Assistance Needed Partial/moderate assistance   Comment Min A, w/c<>toilet using GB   CARE Score 3       Goals  Short term goals  Time Frame for Short term goals: 1 week  Short term goal 1: MET  Short term goal 2: pt will complete LB dressing with  min A  Short term goal 3: pt will complete overall bathing with min A  Short term goal 4: pt will complete simple ambulatory home making task with min A  Short term goal 5: pt will don/doff back brace with independence  Short term goal 6: pt will complete 1-2 handed standing task for 3 mins with CGA  Short term goal 7: pt will complete R FM/sensory acts to improve function of hand during ADL task  Short term goal 8: Pt will increase B  strength by 2#. Short term goal 9: Pt will decrease B 9 hole peg test time by 2 seconds.   Long term goals  Time Frame for Long term goals : 2 weeks  Long term goal 1: complete overall dressing with supervision  Long term goal 2: complete overall bathing with supervision  Long term goal 3: complete overall toileting with supervision  Long term goal 4: complete simple ambulatory home making task with supervision  Long term goal 5: complete HEP with independence       Therapy Time   Individual Concurrent Group Co-treatment   Time In 1000         Time Out 1100         Minutes 60         Timed Code Treatment Minutes: 75 Agustin Ding, OT

## 2021-03-31 NOTE — PROGRESS NOTES
Infectious Diseases Progress Note    Patient:  Zamzam Thompson  YOB: 1955  MRN: [id number]   Admit date: 3/26/2021   Admitting Physician: Deniz Noriega MD  Primary Care Physician: Michelle Goldsmith MD    Chief Complaint/Interval History: She was sitting at side. She is working with speech therapy. She looks more comfortable. She indicates she worked with physical therapy yesterday. It sounds like she was able to work with them for 3 hours. No pain at PICC site. Tolerating antibiotic treatment. Interval notes reviewed. In/Out    Intake/Output Summary (Last 24 hours) at 3/31/2021 0853  Last data filed at 3/31/2021 0615  Gross per 24 hour   Intake 840 ml   Output 180 ml   Net 660 ml     Allergies:    Allergies   Allergen Reactions    Amoxicillin Rash    Lipitor Rash    Niaspan [Niacin Er] Rash    Penicillins Rash    Relafen [Nabumetone] Rash    Zocor [Simvastatin] Rash     Muscle cramps     Current Meds: predniSONE (DELTASONE) tablet 5 mg, Daily  magnesium oxide (MAG-OX) tablet 400 mg, Daily  potassium chloride (KLOR-CON M) extended release tablet 20 mEq, Daily with breakfast  gabapentin (NEURONTIN) capsule 300 mg, Nightly  miconazole (MICOTIN) 2 % powder, PRN  traMADol (ULTRAM) tablet 50 mg, TID  sodium chloride flush 0.9 % injection 10 mL, PRN  sodium chloride flush 0.9 % injection 10 mL, BID  oxyCODONE (ROXICODONE) immediate release tablet 15 mg, 4x Daily  furosemide (LASIX) tablet 40 mg, Daily  levothyroxine (SYNTHROID) tablet 75 mcg, Daily  cloNIDine (CATAPRES) tablet 0.1 mg, BID PRN  acyclovir (ZOVIRAX) capsule 400 mg, BID  pantoprazole (PROTONIX) tablet 40 mg, Daily  docusate sodium (COLACE) capsule 100 mg, BID  therapeutic multivitamin-minerals 1 tablet, Daily  nitroGLYCERIN (NITROSTAT) SL tablet 0.4 mg, Q5 Min PRN  isosorbide mononitrate (IMDUR) extended release tablet 60 mg, BID  carvedilol (COREG) tablet 25 mg, BID WC  guaiFENesin (MUCINEX) extended release tablet 1,200 mg, BID bisacodyl (DULCOLAX) suppository 10 mg, Daily PRN  budesonide (PULMICORT) nebulizer suspension 500 mcg, BID PRN  cyclobenzaprine (FLEXERIL) tablet 10 mg, TID PRN  gabapentin (NEURONTIN) capsule 100 mg, Q12H  heparin (porcine) injection 5,000 Units, Q12H  lidocaine 4 % external patch 1 patch, Daily  magnesium hydroxide (MILK OF MAGNESIA) 400 MG/5ML suspension 30 mL, Daily PRN  naloxegol (MOVANTIK) tablet 25 mg, QAM  ondansetron (ZOFRAN) tablet 4 mg, Q6H PRN  polyethylene glycol (GLYCOLAX) packet 17 g, Daily  sennosides-docusate sodium (SENOKOT-S) 8.6-50 MG tablet 2 tablet, BID  acetaminophen (TYLENOL) tablet 650 mg, Q4H PRN  bisacodyl (DULCOLAX) suppository 10 mg, Daily PRN  ceFAZolin (ANCEF) 2,000 mg in sterile water 20 mL IV syringe, Q8H      Review of Systems see HPI    VitalSigns:  BP (!) 145/72   Pulse 78   Temp 96.7 °F (35.9 °C) (Temporal)   Resp 16   Ht 5' 6\" (1.676 m)   Wt 197 lb 0.9 oz (89.4 kg)   SpO2 90%   BMI 31.81 kg/m²      Physical Exam  Line/IV (left arm PICC) site: No erythema, warmth, induration, or tenderness. She is looking stronger  She seems to be moving legs more easily with less generalized discomfort overall  General-comfortable appearing. Smiling and interactive. Lab Results:  CBC:   Recent Labs     03/29/21  0310   WBC 6.2   HGB 9.6*        BMP:  Recent Labs     03/29/21  0310      K 3.1*   CL 96*   CO2 30*   BUN 7*   CREATININE 0.4*   GLUCOSE 82     Radiology: None    Additional Studies Reviewed:  None    Impression:  1. MSSA bacteremia  2. Right psoas abscess  3. Discitis/osteomyelitis L1/L2  4. Previous cervical spine stenosis-underwent surgical decompression  5. Rheumatoid arthritis  6. History pacemaker placement  7.   History of wound infection following gluteal muscle tear and repair    Recommendations:  Continue cefazolin  Continue physical therapy/Occupational Therapy/speech therapy  No PICC line or antibiotic related problem at present  She seems to be getting benefit from her therapy  Continue to follow    Luis Ovalle MD

## 2021-03-31 NOTE — PROCEDURES
INSTRUMENTAL SWALLOW REPORT  MODIFIED BARIUM SWALLOW    NAME: Faisal Bowles     : 1955  MRN: [id number]     Date of Eval: 3/31/2021     Ordering Physician: Dr. Dejuan Rodriguez  Radiologist: Dr. Viv Lai    History:  Per Neurology: PVJH 78 y.o. female  admitted to [facility] initially on 2021. The patient had been in her usual state of health when she developed a torn right gluteal muscle. She underwent surgical repair on 21 and post-operative course was progressing.   At her 4 week follow up, she developed increased bloody drainage from the wound with erythema. She presented for injection due to RA at which time she suffered a syncopal event. This was felt to be a vasovagal response and she returned to home. She was found later by a family member in the floor for an unknown length of time and the dressing to the right hip/gluteal wound was saturated.  She presented to the hospital  and was noted to be febrile on intake. Workup revealed an elevated CPK and d dimer. Wound cultures were obtained. The patient was admitted to the service of the hospitalists at that time. She underwent pacemaker interrogation in workup for her syncopal episodes which detected no abnormalities. MRI brain negative for acute process. Purulent drainage from the wound was cultured and the patient was started on broad spectrum antibiotics. She was seen in consultation by orthopedics. Blood cultures returned positive for MSSA in all bottles. ID was consulted for antibiotic management at that time. She underwent debridement of the wound per ortho on . Post-operatively, the wound measures 20 cm x 6 cm and wound vac therapy was initiated. Surveillance cultures remained positive and surgical wound cultures positive for e. Coli. She continued with antibiotic therapy with Azactam under direction of ID. Due to persistent bacteremia, she underwent ALFREDA which did not reveal any valvular vegetations.  Due to her need for continued IV antibiotic therapy, wound care and rehabilitation, she transferred to the [facility]. She was seen in consultation by our wound care team and was followed by ID. She developed increased complaints of back pain and imaging revealed a right psoas abscess as well as possible L1-2 osteomyelitis discitis. Inflammatory markers were also elevated. The patient had also complained of increased weakness and incoordination to bilateral upper extremities. Neurosurgery was consulted and recommended cervical decompression as well as continued antibiotic therapy with plans for extension of lumbar fusion. She transferred to [facility] on 3/3 and underwent cervical corpectomy and decompression. She tolerated the procedure without difficulty and returned to our facility on 3/6. The patient was initially progressing well with therapy upon her return, but she developed increased right hip pain impeding further progress. Multiple regimen adjustments were undertaken without relief. She continued on antibiotic treatment under the direction of ID and continued wound care under the direction of our wound care team. She developed increased edema to BLE requiring gentle diuresis. Orthopedic surgery was consulted to evaluate the hip who felt it was more likely related to the persistent psoas abscess noted on imaging. Originally, the patient was scheduled for repeat drainage of the abscess on 3/19, but she was going to require sedation and had eaten breakfast. The procedure was then rescheduled for 3/22.  Neurosurgery then did an L1-L2 discectomy on 3/23/21 for ongoing discitis osteomyelitis in that region that has thus far not responded to antibiotic therapy. Following I and D of psoas abscess, the patient was discharge from the [facility] to [facility] for planned neurosurgical procedure. Pt tolerated procedure well. She was fitted for a LSO brace.  Dr Sivan Stern consulted for antibiotic recommendations            Past Medical History:    has a past medical history of CAD (coronary artery disease), Cellulitis, History of blood transfusion, History of DVT (deep vein thrombosis), History of neutropenia, History of pulmonary fibrosis, Hx of blood clots, Hypercholesterolemia, Hypertension, Intrinsic asthma, Pacemaker, Post op infection, Rheumatoid arteritis ([address] Sinus node dysfunction ([address] Staph aureus infection, Status post placement of implantable loop recorder, Syncope, and Thyroid disease. Past Surgical History:    has a past surgical history that includes Diagnostic Cardiac Cath Lab Procedure; LEEP; Coronary angioplasty with stent (2014); Pacemaker insertion (11/17/2016); back surgery (01/01/2018); pr total knee arthroplasty (Right, 9/11/2018); pr incis/drain thigh/knee abscess,deep (Right, 10/23/2018); Cardiac catheterization (5/14/14  [facility]); Cardiac catheterization (1/26/15  [facility]); pacemaker placement; joint replacement; and Revision total knee arthroplasty (Right, 12/6/2018). Current Diet Solid Consistency:   Dental Soft    Current Diet Liquid Consistency: Thin    Type of Study:   Initial MBS      Patient Complaints/Reason for Referral:  Kylee Koenig was referred for a MBS to assess the efficiency of his/her swallow function, assess for aspiration, and to make recommendations regarding safe dietary consistencies, effective compensatory strategies, and safe eating environment. The patient was taking her medications this morning and exhibited immediate wet vocal quality, gurgling, audible backflow of material which resulted in emesis. Due to her recent ACDF, she was recommended to complete an MBSS to further evaluate the swallow function. Behavior/Cognition/Vision/Hearing:  Behavior/Cognition: Alert; Cooperative    Impressions:  No aspiration was observed this study. Trace laryngeal penetration above the vocal folds was noted with ejection from the airway. No significant pharyngeal residue was noted.  She is recommended to remain on a dental soft diet with thin liquids and upgrade as appropriate. Patient Position:   Lateral and Patient Degrees: 90      Consistencies Administered:   Reg solid; Dysphagia Soft and Bite-Sized (Dysphagia III); Dysphagia Pureed (Dysphagia I); Thin straw; Thin cup;Pudding teaspoon    Thin liquids via cup resulted in laryngeal penetration above the vocal folds with ejection. Thin liquids via cup were presented twice, and no penetration was noted. Thin liquids via straw were presented four times and two presentations resulted in trace laryngeal penetration above the vocal folds with ejection from the airway. Penetration was noted before and during the swallow due to premature loss over the tongue base. Puree and pudding were tolerated well. Mechanical soft resulted in the valleculae filling with material and spilling to the pyriforms. Solids resulted in material filling the valleculae. Minimal base of tongue was noted. Minimal vallecular residue noted after dry swallows. Oral Phase:   Oral phase of the swallow was within functional limits. No difficutly with mastication or oral transit. No anterior spillage was noted. Pharyngeal:   Mild pharyngeal phase dysphagia due to trace laryngeal penetration of thin liquids via cup and straw with ejection from the airway. No aspiration was observed this study. Good hyolaryngeal elevation, anterior hyoid excursion and epiglottic inversion. Dysphagia Outcome Severity Scale:   Level 3: Moderate dysphagia- Total assisstance, supervision or strategies. Two or more diet consistencies restricted    Penetration-Aspiration Scale (PAS):   2 - Material enters the airway, remains above the vocal folds, and is ejected from the airway    Recommended Diet:  Solid consistency: Regular  Liquid consistency: Thin  Liquid administration via: Cup;Straw    Medication administration: (Meds whole with water)    Safe Swallow Protocol:  Compensatory Swallowing Strategies:  Alternate solids and liquids;Upright as possible for all oral intake;Remain upright for 30-45 minutes after meals     Recommendations/Treatment  Requires SLP Intervention: Yes        D/C Recommendations: To be determined      Recommended Exercises:    Therapeutic Interventions: Oral care;Diet tolerance monitoring; Therapeutic PO trials with SLP;Patient/Family education    Education:   Images and recommendations were reviewed with the patient following this exam.   Patient Education: Skilled education was provided regarding swallowing anatomy and physiology. Results of the MBSS were reviewed in detail and she expressed understanding. Patient Education Response: Verbalizes understanding    Duration/Frequency of Treatment  Duration/Frequency of Treatment: 2-3 weeks      Goals:    Long Term:      Goal 1: The patient will maintain adequate hydration/nutrition with optimum safety and efficiency of swallowing function with P.O. intake without overt signs and symptoms of aspiration for the highest appropriate diet level. Short Term:  Goal 1: The patient will tolerate a dysphagia soft and bite sized diet with thin liquids with minimal overt s/s of aspiration. Goal 2: Staff/caregivers will complete daily oral hygiene to prevent aspiration of bacteria laden secretions. Goal 3: The patient will participate in a modified barium swallow study to futher assess swallow function.  met                  Esophageal Phase  Esophageal Screen: [facility]        Pain   Patient Currently in Pain: Yes  Pain Level: 5  Pain Type: Acute pain  Pain Location: Back                Jacqueline Gardiner, 3/31/2021, 3:28 PM        Electronically Signed By:  Jacqueline Gardiner M.S. CCC-SLP  3/31/2021,3:32 PM.

## 2021-03-31 NOTE — PROGRESS NOTES
03/31/21 1453 03/31/21 1456 03/31/21 1457   Bed Mobility   Rolling Minimal assistance  --   --    Other exercises   Other exercises 1  --  Supine BLE ex  x10-15 reps. --    Conditions Requiring Skilled Therapeutic Intervention   Assessment  --  Performed supine BLE ex  A-AAROM  x10-15 reps. --    Activity Tolerance   Activity Tolerance  --  Patient limited by endurance; Patient limited by pain  --    Safety Devices   Type of devices  --   --  Bed alarm in place;Call light within reach   Electronically signed by Guera Kilgore PTA on 3/31/2021 at 2:58 PM

## 2021-03-31 NOTE — PROGRESS NOTES
Speech Language Pathology  Facility/Department: [facility] 8 REHAB UNIT  Initial Speech/Language/Cognitive Assessment    NAME: Murray Barr  : 1955   MRN: [id number]  ADMISSION DATE: 3/26/2021   Date of Eval: 3/31/2021   Evaluating Therapist: Gregg Hanna MS CCC-SLP      ADMITTING DIAGNOSIS:   has Coronary artery disease of native artery of native heart with stable angina pectoris ([address] Hypertension; Rheumatoid arteritis ([address] History of pulmonary fibrosis; History of DVT (deep vein thrombosis); Thyroid disease; Intrinsic asthma; Hypercholesterolemia; Syncope, cardiogenic; Near syncope; Status post placement of implantable loop recorder; Left carotid bruit; S/P coronary artery stent placement; Chest pain; Pacemaker; Sinus node dysfunction ([address] Lumbar stenosis with neurogenic claudication; Spondylolisthesis of lumbar region; Leg swelling; Primary osteoarthritis of right knee; Infection of total right knee replacement ([address] Myalgia due to statin; Abnormal stress test; and Discitis on their problem list.    History:  Per Neurology: FYNQ 69 y.o. female  admitted to [facility] initially on 2021. The patient had been in her usual state of health when she developed a torn right gluteal muscle. She underwent surgical repair on 21 and post-operative course was progressing.   At her 4 week follow up, she developed increased bloody drainage from the wound with erythema. She presented for injection due to RA at which time she suffered a syncopal event. This was felt to be a vasovagal response and she returned to home. She was found later by a family member in the floor for an unknown length of time and the dressing to the right hip/gluteal wound was saturated.  She presented to the hospital  and was noted to be febrile on intake. Workup revealed an elevated CPK and d dimer. Wound cultures were obtained. The patient was admitted to the service of the hospitalists at that time.  She underwent pacemaker interrogation in workup for her syncopal episodes which detected no abnormalities. MRI brain negative for acute process. Purulent drainage from the wound was cultured and the patient was started on broad spectrum antibiotics. She was seen in consultation by orthopedics. Blood cultures returned positive for MSSA in all bottles. ID was consulted for antibiotic management at that time. She underwent debridement of the wound per ortho on 2/9. Post-operatively, the wound measures 20 cm x 6 cm and wound vac therapy was initiated. Surveillance cultures remained positive and surgical wound cultures positive for e. Coli. She continued with antibiotic therapy with Azactam under direction of ID. Due to persistent bacteremia, she underwent ALFREDA which did not reveal any valvular vegetations. Due to her need for continued IV antibiotic therapy, wound care and rehabilitation, she transferred to the [facility]. She was seen in consultation by our wound care team and was followed by ID. She developed increased complaints of back pain and imaging revealed a right psoas abscess as well as possible L1-2 osteomyelitis discitis. Inflammatory markers were also elevated. The patient had also complained of increased weakness and incoordination to bilateral upper extremities. Neurosurgery was consulted and recommended cervical decompression as well as continued antibiotic therapy with plans for extension of lumbar fusion. She transferred to [facility] on 3/3 and underwent cervical corpectomy and decompression. She tolerated the procedure without difficulty and returned to our facility on 3/6. The patient was initially progressing well with therapy upon her return, but she developed increased right hip pain impeding further progress. Multiple regimen adjustments were undertaken without relief.  She continued on antibiotic treatment under the direction of ID and continued wound care under the direction of our wound care team. She developed increased edema to BLE requiring gentle diuresis. Orthopedic surgery was consulted to evaluate the hip who felt it was more likely related to the persistent psoas abscess noted on imaging. Originally, the patient was scheduled for repeat drainage of the abscess on 3/19, but she was going to require sedation and had eaten breakfast. The procedure was then rescheduled for 3/22.  Neurosurgery then did an L1-L2 discectomy on 3/23/21 for ongoing discitis osteomyelitis in that region that has thus far not responded to antibiotic therapy. Following I and D of psoas abscess, the patient was discharge from the [facility] to [facility] for planned neurosurgical procedure. Pt tolerated procedure well. She was fitted for a LSO brace. Dr Evangelist Edwards consulted for antibiotic recommendations       Pain:  Pain Assessment  Pain Assessment: 0-10  Pain Level: 5  Patient's Stated Pain Goal: No pain  Pain Type: Acute pain  Pain Location: Back  Pain Orientation: Lower  Pain Radiating Towards: no  Pain Descriptors: Discomfort  Pain Frequency: Intermittent  Pain Onset: Gradual  Clinical Progression: Not changed  Functional Pain Assessment: Activities are not prevented  Non-Pharmaceutical Pain Intervention(s): Distraction  Response to Pain Intervention: Patient Satisfied  Multiple Pain Sites: No    Assessment:  The patient exhibits moderate to severe expressive language deficits, moderate cognitive deficits and moderate deficits in executive function. She is recommended to receive inpatient acute rehab speech therapy services 1-2x/day, 5-6x/week for 2-3 weeks. Recommendations:  Requires SLP Intervention: Yes  Duration/Frequency of Treatment: 2-3 weeks  D/C Recommendations:  To be determined       Plan:   Goals:  Short-term Goals  Goal 1: The patient will provide 15 members in a given category with 90% accuracy and (min)cues in the form of a visual aid, semantic/phonemic cueing, etc.  Goal 2: The patient will demonstrate functional problem solving and safety awareness with 90% verbal,tactile and visual cues for daily living tasks in order to increase safe interaction with environmentand decreased assistance from caregivers  Goal 3: The patient will demonstrate recall of functional information following a/an (immediate, shortterm,long-term) delay with min cues in order to increase functional integration into environment. Goal 4: The patient will recall 8 facts from (sentence, paragraph, several paragraph, page) length material with 90% accuracy and min cueing. Long-term Goals  Goal 1: Patient will develop functional, cognitive-linguistic-based skills and utilize compensatory strategies tocommunicate wants and needs effectively to different conversational partners, maintain safety and participate socially in functional living environment  Goal 2: Patient will demonstrate functional problem solving skills and provide appropriate solutions to problems in order to improve safety and awareness in functional living environment. Patient/family involved in developing goals and treatment plan:     Subjective:  General  Chart Reviewed: Yes  Patient assessed for rehabilitation services?: Yes  Family / Caregiver Present: No                 Objective:  Oral/Motor  Oral Motor: (Natural dentition. Left labial asymmetry. No lingual deviation.)    Auditory Comprehension  Yes/No Questions: [facility])  One Step Basic Commands: (WFL)  Common Objects: (WFL)  Pictures: (WFL)  L/R Discrimination: To be assessed in therapy  Conversation: Mild    Reading Comprehension  Reading Status: Within functional limits    Expression  Primary Mode of Expression: Verbal    Verbal Expression  Verbal Expression: Exceptions to functional limits  Initiation: (WFL)  Automatic Speech: (WFL)  Confrontation: (WFL)  Convergent: Severe  Divergent: Severe  Conversation: Mild    Written Expression  Dominant Hand: Right  Written Expression: Within Functional Limits    Motor Speech  Motor Speech:  Within Functional Limits    Pragmatics/Social Functioning  Affect: (WFL)  Eye Contact: (WFL)  Monotone: (WFL)    Cognition:     Orientation  Overall Orientation Status: Within Normal Limits  Attention  Selective Attention: Mild  Sustained Attention: Mild  Memory  Memory: Exceptions to [facility]  Short-term Memory: Moderate  Working Memory: Moderate  Problem Solving  Problem Solving: Exceptions to [facility]  Verbal Reasoning Skills: Moderate  Numeric Reasoning  Numeric Reasoning: Exceptions to [facility]   Calculations: Moderate  Money Management: Moderate  Time: (WFL)  Safety/Judgement  Safety/Judgement: Exceptions to [facility]  Unable to Self-monitor and Self-correct Consistently: Moderate    Additional Assessments: The [facility] Mental Status Exam or SLUMS was completed. Portions of the WAB and RIPA were also completed. Some subtests from the CLQT were completed. Will complete the CLQT during a later session. Prognosis:  Good    Education:  Patient Education: Skilled education was provided regarding swallowing anatomy and physiology. Discussed need for MBSS today to further assess swallow function.   Patient Education Response: Verbalizes understanding                  3/31/2021 12:32 PM     Electronically Signed By:  Leonora Reyes  3/31/2021,12:36 PM.

## 2021-03-31 NOTE — PROGRESS NOTES
[facility]  Follow-up Progress Note       NAME:  Carmita Otoole  MEDICAL RECORD NUMBER:  [id number]  AGE:  77 y.o. GENDER:  female  :  1955  TODAY'S DATE:  3/31/2021    Subjective:   Wound Identification:  Wound Type: Surgical  Contributing Factors: none        Patient Goal of Care:  [] Wound Healing  [] Odor Control  [] Palliative Care  [] Pain Control   [] Other:     Objective:    BP (!) 145/72   Pulse 78   Temp 96.7 °F (35.9 °C) (Temporal)   Resp 16   Ht 5' 6\" (1.676 m)   Wt 197 lb 0.9 oz (89.4 kg)   SpO2 90%   BMI 31.81 kg/m²   Varinder Risk Score: Varinder Scale Score: 17  Assessment:   Measurements:  Negative Pressure Wound Therapy Leg Anterior;Proximal;Right;Upper (Active)   $ Standard NPWT >50 sq cm PER TX $ Yes 21 1523   Wound Type Surgical 21 1523   Unit Type 3M KCI 21 1523   Dressing Type Black foam 21 1523   Number of pieces used 4 21 1523   Cycle Continuous 21 1523   Target Pressure (mmHg) 125 21 1523   Dressing Status Changed 21 1523   Dressing Changed Changed/New 21 1523   Drainage Amount Large 21 1523   Drainage Description Serosanguinous 21 1523   Dressing Change Due 21 1523   Output (ml) 50 ml 21 0800   Wound Assessment Red;Granulation tissue;Slough 21 1523   Aixa-wound Assessment Maceration; Other (Comment) 21 1523   Shape Oval 21 1523   Odor None 21 1523   Number of days: 2       Wound 21 Thigh Proximal;Right;Lateral open incision, deep (Active)   Wound Image   21 1159   Wound Etiology Surgical 21 0800   Dressing Status Other (Comment) 21 0800   Wound Cleansed Irrigated with saline;Cleansed with saline 21 1159   Dressing/Treatment Negative pressure wound therapy 21 0800   Dressing Change Due 21 0800 Wound Length (cm) 18.5 cm 03/29/21 1159   Wound Width (cm) 8.5 cm 03/29/21 1159   Wound Depth (cm) 10 cm 03/29/21 1159   Wound Surface Area (cm^2) 157.25 cm^2 03/29/21 1159   Wound Volume (cm^3) 1572.5 cm^3 03/29/21 1159   Wound Assessment Exposed structure muscle;Granulation tissue;Slough 03/29/21 1159   Drainage Amount Moderate 03/31/21 0800   Drainage Description Serosanguinous 03/31/21 0800   Odor None 03/29/21 1159   Aixa-wound Assessment Maceration; Other (Comment) 03/29/21 1159   Margins Attached edges 03/29/21 1159   Wound Thickness Description not for Pressure Injury Full thickness 03/29/21 1159   Number of days: 3       Response to treatment:  Well tolerated by patient., With complaints of pain. Pain Assessment:  Severity:  6 / 10  Quality of pain: burning, tender  Wound Pain Timing/Severity: intermittent  Premedicated: Yes  Plan:   Plan of Care: Wound 03/27/21 Thigh Proximal;Right;Lateral open incision, deep-Dressing/Treatment: Negative pressure wound therapy    Specialty Bed Required : N/A   [] Low Air Loss   [] Pressure Redistribution  [] Fluid Immersion  [] Bariatric  [] Total Pressure Relief  [] Other:     Current Diet: Dietary Nutrition Supplements: Standard High Calorie Oral Supplement, Wound Healing Oral Supplement  DIET GENERAL; Dental Soft  Dietician consult:  Yes    Discharge Plan:  Placement for patient upon discharge: home with support   Patient appropriate for Outpatient [address]: Yes    Referrals:  []   [] [address] [place] [address]  [] Supplies  [] Other    Patient/Caregiver Teaching:  Level of patient/caregiver understanding able to:   [] Indicates understanding       [] Needs reinforcement  [] Unsuccessful      [] Verbal Understanding  [] Demonstrated understanding       [] No evidence of learning  [] Refused teaching         [] N/A       Wound vac dressing changed. All foam removed. Wound and periwound skin cleaned with soap and water.  Yeast and denuded skin to periwound is improved at this change. Crusting technique used with antifungal powder and skip prep to periwound skin and under all drape. Black foam placed to wound bed and depth of wound filled with black foam. All sealed with vac drape and negative 125mmHg applied. No leaks or alarms noted on wound vac. Patient tolerated well with some tenderness and burning with removal of old dressing that easily resolved with completion of procedure.          Electronically signed by Rebekah Naranjo RN, [facility] on 3/31/2021 at 3:26 PM

## 2021-03-31 NOTE — PLAN OF CARE
Problem: Falls - Risk of:  Goal: Will remain free from falls  Description: Will remain free from falls  Outcome: Ongoing  Goal: Absence of physical injury  Description: Absence of physical injury  Outcome: Ongoing     Problem: Pain:  Goal: Pain level will decrease  Description: Pain level will decrease  Outcome: Ongoing  Goal: Control of acute pain  Description: Control of acute pain  Outcome: Ongoing  Goal: Control of chronic pain  Description: Control of chronic pain  Outcome: Ongoing     Problem: SAFETY  Goal: Free from accidental physical injury  Outcome: Ongoing  Goal: Free from intentional harm  Outcome: Ongoing  Goal: LTG - Patient will demonstrate safety requirements appropriate to situation/environment  Outcome: Ongoing  Goal: LTG - patient will utilize safety techniques  Outcome: Ongoing  Goal: STG - patient locks brakes on wheelchair  Outcome: Ongoing  Goal: STG - Patient uses call light consistently to request assistance with transfers  Outcome: Ongoing  Goal: STG - patient uses gait belt during all transfers  Outcome: Ongoing     Problem: DAILY CARE  Goal: Daily care needs are met  Outcome: Ongoing     Problem: PAIN  Goal: Patient's pain/discomfort is manageable  Outcome: Ongoing  Goal: STG - pain is manageable through therapies  Outcome: Ongoing  Goal: STG - Patient will verbalize an acceptable level of pain  Outcome: Ongoing  Goal: STG - patients pain is managed to allow active participation in daily activities  Outcome: Ongoing     Problem: SKIN INTEGRITY  Goal: Skin integrity is maintained or improved  Outcome: Ongoing  Goal: LTG - Patient will be free from infection  Outcome: Ongoing  Goal: STG - Patient demonstrates skin care/treatment/dressing change  Outcome: Ongoing  Goal: STG - patient will maintain good skin integrity  Outcome: Ongoing     Problem: KNOWLEDGE DEFICIT  Goal: Patient/S.O. demonstrates understanding of disease process, treatment plan, medications, and discharge instructions.

## 2021-03-31 NOTE — PROGRESS NOTES
Conditions Requiring Skilled Therapeutic Intervention   Assessment  --    Activity Tolerance   Activity Tolerance  --    Safety Devices   Type of devices Call light within reach; Chair alarm in place   Electronically signed by Donna Oliva PTA on 3/31/2021 at 11:59 AM

## 2021-03-31 NOTE — PROGRESS NOTES
Speech Language Pathology  Facility/Department: [facility] 8 REHAB UNIT   CLINICAL BEDSIDE SWALLOW EVALUATION    NAME: Kylee Koenig  : 1955  MRN: [id number]  ADMISSION DATE: 3/26/2021   Date of Eval: 3/31/2021  Evaluating Therapist: Simi Cantu MS CCC-SLP      ADMITTING DIAGNOSIS:   has Coronary artery disease of native artery of native heart with stable angina pectoris ([address] Hypertension; Rheumatoid arteritis ([address] History of pulmonary fibrosis; History of DVT (deep vein thrombosis); Thyroid disease; Intrinsic asthma; Hypercholesterolemia; Syncope, cardiogenic; Near syncope; Status post placement of implantable loop recorder; Left carotid bruit; S/P coronary artery stent placement; Chest pain; Pacemaker; Sinus node dysfunction ([address] Lumbar stenosis with neurogenic claudication; Spondylolisthesis of lumbar region; Leg swelling; Primary osteoarthritis of right knee; Infection of total right knee replacement ([address] Myalgia due to statin; Abnormal stress test; and Discitis on their problem list.    History:  Per Neurology: TZZP 27 y.o. female  admitted to [facility] initially on 2021. The patient had been in her usual state of health when she developed a torn right gluteal muscle. She underwent surgical repair on 21 and post-operative course was progressing.   At her 4 week follow up, she developed increased bloody drainage from the wound with erythema. She presented for injection due to RA at which time she suffered a syncopal event. This was felt to be a vasovagal response and she returned to home. She was found later by a family member in the floor for an unknown length of time and the dressing to the right hip/gluteal wound was saturated.  She presented to the hospital  and was noted to be febrile on intake. Workup revealed an elevated CPK and d dimer. Wound cultures were obtained. The patient was admitted to the service of the hospitalists at that time.  She underwent pacemaker interrogation in workup for her syncopal episodes which detected no abnormalities. MRI brain negative for acute process. Purulent drainage from the wound was cultured and the patient was started on broad spectrum antibiotics. She was seen in consultation by orthopedics. Blood cultures returned positive for MSSA in all bottles. ID was consulted for antibiotic management at that time. She underwent debridement of the wound per ortho on 2/9. Post-operatively, the wound measures 20 cm x 6 cm and wound vac therapy was initiated. Surveillance cultures remained positive and surgical wound cultures positive for e. Coli. She continued with antibiotic therapy with Azactam under direction of ID. Due to persistent bacteremia, she underwent ALFREDA which did not reveal any valvular vegetations. Due to her need for continued IV antibiotic therapy, wound care and rehabilitation, she transferred to the [facility]. She was seen in consultation by our wound care team and was followed by ID. She developed increased complaints of back pain and imaging revealed a right psoas abscess as well as possible L1-2 osteomyelitis discitis. Inflammatory markers were also elevated. The patient had also complained of increased weakness and incoordination to bilateral upper extremities. Neurosurgery was consulted and recommended cervical decompression as well as continued antibiotic therapy with plans for extension of lumbar fusion. She transferred to [facility] on 3/3 and underwent cervical corpectomy and decompression. She tolerated the procedure without difficulty and returned to our facility on 3/6. The patient was initially progressing well with therapy upon her return, but she developed increased right hip pain impeding further progress. Multiple regimen adjustments were undertaken without relief.  She continued on antibiotic treatment under the direction of ID and continued wound care under the direction of our wound care team. She developed increased edema to BLE requiring gentle diuresis. Orthopedic surgery was consulted to evaluate the hip who felt it was more likely related to the persistent psoas abscess noted on imaging. Originally, the patient was scheduled for repeat drainage of the abscess on 3/19, but she was going to require sedation and had eaten breakfast. The procedure was then rescheduled for 3/22.  Neurosurgery then did an L1-L2 discectomy on 3/23/21 for ongoing discitis osteomyelitis in that region that has thus far not responded to antibiotic therapy. Following I and D of psoas abscess, the patient was discharge from the [facility] to [facility] for planned neurosurgical procedure. Pt tolerated procedure well. She was fitted for a LSO brace. Dr Roula Sprague consulted for antibiotic recommendations        Recent Chest Xray/CT of Chest:   One view chest x-ray compared with 02/08/2021. Left cardiac pacer. Heart size is normal.  The mediastinum is within normal limits. The lungs are normally expanded with no pneumonia or pneumothorax. Mild chronic appearing interstitial disease with a few calcified  granulomas. No congestive failure changes    Current Diet level:  Current Diet : Dysphagia Soft and Bite-Sized (Dysphagia III)  Current Liquid Diet : Thin      Primary Complaint  Patient Complaint: She complains of recent onset belching and audible backflow of material. This has been happening after taking her numerous morning medications. This has resulted in emesis.     Pain:  Pain Assessment  Pain Assessment: 0-10  Pain Level: 5  Patient's Stated Pain Goal: No pain  Pain Type: Acute pain  Pain Location: Back  Pain Orientation: Lower  Pain Radiating Towards: no  Pain Descriptors: Discomfort  Pain Frequency: Intermittent  Pain Onset: Gradual  Clinical Progression: Not changed  Functional Pain Assessment: Activities are not prevented  Non-Pharmaceutical Pain Intervention(s): Distraction  Response to Pain Intervention: Patient Satisfied  Multiple Pain Sites: No Reason for Referral  Lucina Dodson was referred for a bedside swallow evaluation to assess the efficiency of her swallow function, identify signs and symptoms of aspiration and make recommendations regarding safe dietary consistencies, effective compensatory strategies, and safe eating environment. Impression  Dysphagia Impression : Moderate pharyngeal phase dysphagia due to poor hyolaryngeal elevation and delayed swallow responses. No immediate overt s/s of aspiration observed with thin liquids, puree or solids. However,concern for prevertebral edema and backflow due to edema. Recommend completing an MBSS to further evaluate her swallow function. Dysphagia Outcome Severity Scale: Level 3: Moderate dysphagia- Total assisstance, supervision or strategies. Two or more diet consistencies restricted     Dysphagia Diagnosis: Moderate pharyngeal stage dysphagia    Treatment Plan  Requires SLP Intervention: Yes  Duration/Frequency of Treatment: 2-3 weeks  D/C Recommendations: To be determined       Recommended Diet and Intervention  Diet Solids Recommendation: Dysphagia Soft and Bite-Sized (Dysphagia III)  Liquid Consistency Recommendation: Thin  Recommended Form of Meds: Whole with water  Recommendations: Modified barium swallow study  Therapeutic Interventions: Oral care;Diet tolerance monitoring; Therapeutic PO trials with SLP;Patient/Family education    Compensatory Swallowing Strategies  Compensatory Swallowing Strategies: Alternate solids and liquids;Upright as possible for all oral intake;Remain upright for 30-45 minutes after meals    Treatment/Goals  Short-term Goals  Goal 1: The patient will tolerate a dysphagia soft and bite sized diet with thin liquids with minimal overt s/s of aspiration. Goal 2: Staff/caregivers will complete daily oral hygiene to prevent aspiration of bacteria laden secretions. Goal 3: The patient will participate in a modified barium swallow study to futher assess swallow function. Long-term Goals  Goal 1: The patient will maintain adequate hydration/nutrition with optimum safety and efficiency of swallowing function with P.O. intake without overt signs and symptoms of aspiration for the highest appropriate diet level. General  Chart Reviewed: Yes  Behavior/Cognition: Alert; Cooperative;Pleasant mood  Temperature Spikes Noted: No  Respiratory Status: Room air  Breath Sounds: Clear  O2 Device: None (Room air)  Communication Observation: Functional  Follows Directions: Simple  Dentition: Adequate  Patient Positioning: Upright in bed  Baseline Vocal Quality: Normal  Volitional Cough: Strong  Volitional Swallow: Delayed  Prior Dysphagia History: Patient denies any dysphagia prior to her surgery. She states she was consuming a regular diet with thin liquids at home without difficulty. She was taking pills one at a time followed by water. Consistencies Administered: Reg solid; Dysphagia Pureed (Dysphagia I); Thin - straw; Thin - cup        Oral Motor Deficits  Oral/Motor  Oral Motor: (Natural dentition. Left labial asymmetry. No lingual deviation.)    Oral Phase Dysfunction  Oral Phase  Oral Phase - Comment: Oral phase was within functional limits. No difficulty with mastication or oral transit. No oral residue or buccal cavity pocketing noted. Indicators of Pharyngeal Phase Dysfunction   Pharyngeal Phase   Pharyngeal: Moderate pharyngeal phase dysphagia due to poor hyolaryngeal elevation and delayed swallow responses. Audible backflow of thin liquids and meds was noted. Belching was noted followed by emesis. No immediate overt s/s of aspiration observed with pureed foods, solids or thin liquids via cup or straw. She denies belching or emesis following meals, only medication administration. Prognosis  Good    Education  Patient Education: Skilled education was provided regarding swallowing anatomy and physiology. Discussed need for MBSS today to further assess swallow function.   Patient Education Response: Verbalizes understanding             Therapy Time  SLP Individual Minutes  Time In: 0900  Time Out: 0930  Minutes: 30            3/31/2021 12:29 PM      Electronically Signed By:  Vadim Daily M.S., CCC-SLP  3/31/2021,12:31 PM.

## 2021-04-01 LAB
ANION GAP SERPL CALCULATED.3IONS-SCNC: 10 MMOL/L (ref 7–19)
BASOPHILS ABSOLUTE: 0.1 K/UL (ref 0–0.2)
BASOPHILS RELATIVE PERCENT: 0.9 % (ref 0–1)
BUN BLDV-MCNC: 12 MG/DL (ref 8–23)
CALCIUM SERPL-MCNC: 8.5 MG/DL (ref 8.8–10.2)
CHLORIDE BLD-SCNC: 98 MMOL/L (ref 98–111)
CO2: 32 MMOL/L (ref 22–29)
CREAT SERPL-MCNC: 0.5 MG/DL (ref 0.5–0.9)
EOSINOPHILS ABSOLUTE: 0.1 K/UL (ref 0–0.6)
EOSINOPHILS RELATIVE PERCENT: 2.5 % (ref 0–5)
GFR AFRICAN AMERICAN: >59
GFR NON-AFRICAN AMERICAN: >60
GLUCOSE BLD-MCNC: 91 MG/DL (ref 74–109)
HCT VFR BLD CALC: 28.8 % (ref 37–47)
HEMOGLOBIN: 8.6 G/DL (ref 12–16)
IMMATURE GRANULOCYTES #: 0.1 K/UL
LYMPHOCYTES ABSOLUTE: 1.7 K/UL (ref 1.1–4.5)
LYMPHOCYTES RELATIVE PERCENT: 31.3 % (ref 20–40)
MCH RBC QN AUTO: 28 PG (ref 27–31)
MCHC RBC AUTO-ENTMCNC: 29.9 G/DL (ref 33–37)
MCV RBC AUTO: 93.8 FL (ref 81–99)
MONOCYTES ABSOLUTE: 0.9 K/UL (ref 0–0.9)
MONOCYTES RELATIVE PERCENT: 17.4 % (ref 0–10)
NEUTROPHILS ABSOLUTE: 2.5 K/UL (ref 1.5–7.5)
NEUTROPHILS RELATIVE PERCENT: 46.8 % (ref 50–65)
PDW BLD-RTO: 16.7 % (ref 11.5–14.5)
PLATELET # BLD: 198 K/UL (ref 130–400)
PMV BLD AUTO: 11.1 FL (ref 9.4–12.3)
POTASSIUM REFLEX MAGNESIUM: 4 MMOL/L (ref 3.5–5)
RBC # BLD: 3.07 M/UL (ref 4.2–5.4)
SODIUM BLD-SCNC: 140 MMOL/L (ref 136–145)
WBC # BLD: 5.3 K/UL (ref 4.8–10.8)

## 2021-04-01 PROCEDURE — 97110 THERAPEUTIC EXERCISES: CPT

## 2021-04-01 PROCEDURE — 1180000000 HC REHAB R&B

## 2021-04-01 PROCEDURE — 92526 ORAL FUNCTION THERAPY: CPT

## 2021-04-01 PROCEDURE — 97130 THER IVNTJ EA ADDL 15 MIN: CPT

## 2021-04-01 PROCEDURE — 6370000000 HC RX 637 (ALT 250 FOR IP): Performed by: PSYCHIATRY & NEUROLOGY

## 2021-04-01 PROCEDURE — 80048 BASIC METABOLIC PNL TOTAL CA: CPT

## 2021-04-01 PROCEDURE — 99232 SBSQ HOSP IP/OBS MODERATE 35: CPT | Performed by: PSYCHIATRY & NEUROLOGY

## 2021-04-01 PROCEDURE — 85025 COMPLETE CBC W/AUTO DIFF WBC: CPT

## 2021-04-01 PROCEDURE — 97530 THERAPEUTIC ACTIVITIES: CPT

## 2021-04-01 PROCEDURE — 97535 SELF CARE MNGMENT TRAINING: CPT

## 2021-04-01 PROCEDURE — 97129 THER IVNTJ 1ST 15 MIN: CPT

## 2021-04-01 PROCEDURE — 6360000002 HC RX W HCPCS: Performed by: PSYCHIATRY & NEUROLOGY

## 2021-04-01 PROCEDURE — 36415 COLL VENOUS BLD VENIPUNCTURE: CPT

## 2021-04-01 PROCEDURE — 2580000003 HC RX 258: Performed by: PSYCHIATRY & NEUROLOGY

## 2021-04-01 RX ORDER — PREDNISONE 1 MG/1
5 TABLET ORAL DAILY
Status: DISCONTINUED | OUTPATIENT
Start: 2021-04-02 | End: 2021-04-01 | Stop reason: SDUPTHER

## 2021-04-01 RX ORDER — PREDNISONE 1 MG/1
10 TABLET ORAL DAILY
Status: COMPLETED | OUTPATIENT
Start: 2021-04-07 | End: 2021-04-09

## 2021-04-01 RX ORDER — PREDNISONE 20 MG/1
20 TABLET ORAL DAILY
Status: COMPLETED | OUTPATIENT
Start: 2021-04-04 | End: 2021-04-06

## 2021-04-01 RX ORDER — PREDNISONE 1 MG/1
5 TABLET ORAL DAILY
Status: DISCONTINUED | OUTPATIENT
Start: 2021-04-10 | End: 2021-04-12 | Stop reason: HOSPADM

## 2021-04-01 RX ADMIN — PREDNISONE 25 MG: 20 TABLET ORAL at 10:00

## 2021-04-01 RX ADMIN — ACYCLOVIR 400 MG: 200 CAPSULE ORAL at 08:41

## 2021-04-01 RX ADMIN — HEPARIN SODIUM 5000 UNITS: 5000 INJECTION INTRAVENOUS; SUBCUTANEOUS at 21:56

## 2021-04-01 RX ADMIN — ACYCLOVIR 400 MG: 200 CAPSULE ORAL at 21:55

## 2021-04-01 RX ADMIN — OXYCODONE 15 MG: 5 TABLET ORAL at 13:17

## 2021-04-01 RX ADMIN — PANTOPRAZOLE SODIUM 40 MG: 40 TABLET, DELAYED RELEASE ORAL at 08:40

## 2021-04-01 RX ADMIN — WATER 2000 MG: 1 INJECTION INTRAMUSCULAR; INTRAVENOUS; SUBCUTANEOUS at 22:00

## 2021-04-01 RX ADMIN — MAGNESIUM OXIDE TAB 400 MG (240 MG ELEMENTAL MG) 400 MG: 400 (240 MG) TAB at 08:40

## 2021-04-01 RX ADMIN — POTASSIUM CHLORIDE 20 MEQ: 1500 TABLET, EXTENDED RELEASE ORAL at 08:40

## 2021-04-01 RX ADMIN — HEPARIN SODIUM 5000 UNITS: 5000 INJECTION INTRAVENOUS; SUBCUTANEOUS at 08:41

## 2021-04-01 RX ADMIN — SODIUM CHLORIDE, PRESERVATIVE FREE 10 ML: 5 INJECTION INTRAVENOUS at 22:02

## 2021-04-01 RX ADMIN — WATER 2000 MG: 1 INJECTION INTRAMUSCULAR; INTRAVENOUS; SUBCUTANEOUS at 11:50

## 2021-04-01 RX ADMIN — CARVEDILOL 25 MG: 25 TABLET, FILM COATED ORAL at 16:38

## 2021-04-01 RX ADMIN — OXYCODONE 15 MG: 5 TABLET ORAL at 08:41

## 2021-04-01 RX ADMIN — GABAPENTIN 100 MG: 100 CAPSULE ORAL at 08:40

## 2021-04-01 RX ADMIN — Medication 1 TABLET: at 08:40

## 2021-04-01 RX ADMIN — SODIUM CHLORIDE, PRESERVATIVE FREE 10 ML: 5 INJECTION INTRAVENOUS at 08:49

## 2021-04-01 RX ADMIN — NALOXEGOL OXALATE 25 MG: 12.5 TABLET, FILM COATED ORAL at 08:41

## 2021-04-01 RX ADMIN — TRAMADOL HYDROCHLORIDE 50 MG: 50 TABLET, FILM COATED ORAL at 08:40

## 2021-04-01 RX ADMIN — PREDNISONE 5 MG: 5 TABLET ORAL at 08:40

## 2021-04-01 RX ADMIN — DOCUSATE SODIUM 100 MG: 100 CAPSULE, LIQUID FILLED ORAL at 21:56

## 2021-04-01 RX ADMIN — GUAIFENESIN 1200 MG: 600 TABLET, EXTENDED RELEASE ORAL at 08:40

## 2021-04-01 RX ADMIN — POLYETHYLENE GLYCOL 3350 17 G: 17 POWDER, FOR SOLUTION ORAL at 08:39

## 2021-04-01 RX ADMIN — OXYCODONE 15 MG: 5 TABLET ORAL at 21:55

## 2021-04-01 RX ADMIN — GABAPENTIN 100 MG: 100 CAPSULE ORAL at 21:55

## 2021-04-01 RX ADMIN — FUROSEMIDE 40 MG: 40 TABLET ORAL at 08:41

## 2021-04-01 RX ADMIN — ISOSORBIDE MONONITRATE 60 MG: 60 TABLET, EXTENDED RELEASE ORAL at 21:56

## 2021-04-01 RX ADMIN — TRAMADOL HYDROCHLORIDE 50 MG: 50 TABLET, FILM COATED ORAL at 21:55

## 2021-04-01 RX ADMIN — WATER 2000 MG: 1 INJECTION INTRAMUSCULAR; INTRAVENOUS; SUBCUTANEOUS at 03:07

## 2021-04-01 RX ADMIN — CARVEDILOL 25 MG: 25 TABLET, FILM COATED ORAL at 08:40

## 2021-04-01 RX ADMIN — SODIUM CHLORIDE, PRESERVATIVE FREE 10 ML: 5 INJECTION INTRAVENOUS at 03:09

## 2021-04-01 RX ADMIN — GABAPENTIN 300 MG: 300 CAPSULE ORAL at 21:54

## 2021-04-01 RX ADMIN — ISOSORBIDE MONONITRATE 60 MG: 60 TABLET, EXTENDED RELEASE ORAL at 08:40

## 2021-04-01 RX ADMIN — OXYCODONE 15 MG: 5 TABLET ORAL at 16:38

## 2021-04-01 RX ADMIN — TRAMADOL HYDROCHLORIDE 50 MG: 50 TABLET, FILM COATED ORAL at 13:17

## 2021-04-01 RX ADMIN — LEVOTHYROXINE SODIUM 75 MCG: 75 TABLET ORAL at 05:45

## 2021-04-01 RX ADMIN — GUAIFENESIN 1200 MG: 600 TABLET, EXTENDED RELEASE ORAL at 21:56

## 2021-04-01 ASSESSMENT — PAIN DESCRIPTION - LOCATION: LOCATION: STERNUM;HIP

## 2021-04-01 ASSESSMENT — PAIN SCALES - GENERAL
PAINLEVEL_OUTOF10: 7
PAINLEVEL_OUTOF10: 7
PAINLEVEL_OUTOF10: 0
PAINLEVEL_OUTOF10: 6
PAINLEVEL_OUTOF10: 8

## 2021-04-01 ASSESSMENT — PAIN DESCRIPTION - DESCRIPTORS
DESCRIPTORS: DISCOMFORT
DESCRIPTORS: DISCOMFORT

## 2021-04-01 NOTE — PROGRESS NOTES
[facility]  INPATIENT SPEECH THERAPY  [facility] 8 REHAB UNIT  TIME   Time In: 1300  Time Out: 1400  Minutes: 60  Total Treatment Time: 60    [x]Daily Note  []Progress Note    Date: 2021  Patient Name: Levi Canas        MRN: [id number]    Account #: [de-identified]  : 1955  (76 y.o.)  Gender: female   Primary Provider: Trace Lewis MD  Precautions: cervical collar, aspiration, fall  Swallowing Status/Diet: Dental soft diet with thin liquids      Subjective:   Pt alert and ready to leave room for skilled ST. She was given pain medication at beginning of treatment session due to pain level of 8. Pt's pain did not interfere with focus during skilled tasks. Pt reported pills stuck at mid-esophagus for longer than ten minutes. Objective:  Swallow function and MBSS reviewed. Verbal education on laryngeal penetration discussed and risks for aspiration. Pt asked questions about reflux. Esophageal dysfunction was not identified  under fluoroscopy. Pt revealed she is not taking a reflux medication. SLP is concerned about severity of GERD that pt reports. GERD precautions educated to pt including pacing, posture, and bite-size. Pt recalled 0/3 strategies over four trials. Verbal repetition and visual cues provided to increase memory recall. Pt recalled 2/3 strategies. Review and education with aspiration precautions completed on 100% of opportunities. Functional problem-solving targeted for return to home environment. Pt identified problems with 50-60% accuracy; provided solutions with 30-40% accuracy. SLP recommends more structured PS tasks next session to increase awareness to cognitive therapy. Recall of functional information including medical, personal, and recent completed with 'wh' questions. Pt with 40-50% accuracy. SLP recommends to monitor reflux with meals and provide education on pacing technique. Continue cognitive assessment.      SHORT TERM GOAL #1:  Goal 1: The patient will provide 15 of Education:  [x]Discussion          [x]Demonstration          []Handout          []Other  Evaluation of Education:   [x]Verbalized understanding   []Demonstrates without assistance  []Demonstrates with assistance  []Needs further instruction     []No evidence of learning                  []No family present      Plan: [x]Continue with current plan of care    []Modify current plan of care as follows:    []Discharge patient    Discharge Location:    Services/Supervision Recommended:      [x]Patient continues to require treatment by a licensed therapist to address functional deficits as outlined in the established plan of care.     Electronically signed by Anthony Delgado M.S.,CCC- SLP on 4/1/21 at 2:17 PM CDT

## 2021-04-01 NOTE — PROGRESS NOTES
Orthopedic Surgery Progress Note    Kiana Brock  4/1/2021      Subjective:     Systemic or Specific Complaints: PT WAS IN THE RESTROOM AT TIME OF MY ARRIVAL. SHE STATED THAT HER WOUND WAS BEING TREATED WITH A VAC AND THAT SHE IS HAVING NO ISSUES AT THIS TIME. NOTES REVIEWED IN CHART. Objective:     Patient Vitals for the past 24 hrs:   BP Temp Temp src Pulse Resp SpO2   04/01/21 0625 125/72 98 °F (36.7 °C) Temporal 82 16 92 %   03/31/21 1919 119/75 96.5 °F (35.8 °C) Temporal 79 18 91 %       right lower  General: alert, appears stated age and cooperative   Wound:              Dressing:    Extremity: NO EXAM, PT IN RESTROOM           DVT Exam:                    Data Review:  Recent Labs     04/01/21  0405   HGB 8.6*     Recent Labs     04/01/21  0405      K 4.0   CREATININE 0.5       Assessment:     1.  MSSA bacteremia-under treatment  2.  Right psoas abscess-MSSA.  She has had the abscess aspirated twice. 3.  Discitis/osteomyelitis L1/L2-underwent lumbar surgery with decompressive laminectomy  4.  Previous cervical spine stenosis-underwent surgical decompression  5.  Rheumatoid arthritis  6.  History of pacemaker placement  7.  History wound infection following gluteal muscle tear and repair    Plan:      CALL IF NEEDED. THANKS.         Electronically signed by Marcia Gong PA-C on 4/1/2021 at 12:54 PM

## 2021-04-01 NOTE — PLAN OF CARE
Problem: Falls - Risk of:  Goal: Will remain free from falls  Description: Will remain free from falls  Outcome: Ongoing  Goal: Absence of physical injury  Description: Absence of physical injury  Outcome: Ongoing     Problem: Pain:  Goal: Pain level will decrease  Description: Pain level will decrease  Outcome: Ongoing  Goal: Control of acute pain  Description: Control of acute pain  Outcome: Ongoing  Goal: Control of chronic pain  Description: Control of chronic pain  Outcome: Ongoing     Problem: SAFETY  Goal: Free from accidental physical injury  Outcome: Ongoing  Goal: Free from intentional harm  Outcome: Ongoing  Goal: LTG - Patient will demonstrate safety requirements appropriate to situation/environment  Outcome: Ongoing  Goal: LTG - patient will utilize safety techniques  Outcome: Ongoing  Goal: STG - patient locks brakes on wheelchair  Outcome: Ongoing  Goal: STG - Patient uses call light consistently to request assistance with transfers  Outcome: Ongoing  Goal: STG - patient uses gait belt during all transfers  Outcome: Ongoing     Problem: PAIN  Goal: Patient's pain/discomfort is manageable  Outcome: Ongoing  Goal: STG - pain is manageable through therapies  Outcome: Ongoing  Goal: STG - Patient will verbalize an acceptable level of pain  Outcome: Ongoing  Goal: STG - patients pain is managed to allow active participation in daily activities  Outcome: Ongoing     Problem: SKIN INTEGRITY  Goal: Skin integrity is maintained or improved  Outcome: Ongoing  Goal: LTG - Patient will be free from infection  Outcome: Ongoing  Goal: STG - Patient demonstrates skin care/treatment/dressing change  Outcome: Ongoing  Goal: STG - patient will maintain good skin integrity  Outcome: Ongoing

## 2021-04-01 NOTE — PROGRESS NOTES
04/01/21 1108 04/01/21 1119 04/01/21 1121   Pain Screening   Patient Currently in Pain Yes  --   --    Pain Assessment   Pain Assessment 0-10  --   --    Pain Level 7  --   --    Patient's Stated Pain Goal No pain  --   --    Pain Type Acute pain  --   --    Pain Location Sternum;Hip  --   --    Pain Orientation Right  --   --    Pain Descriptors Discomfort  --   --    Functional Pain Assessment Prevents or interferes some active activities and ADLs  --   --    Non-Pharmaceutical Pain Intervention(s) Distraction  --   --    Transfers   Sit to Stand  --  Contact guard assistance;Minimal Assistance  --    Stand to sit  --  Contact guard assistance  --    Lateral Transfers  --   --   --    Other exercises   Other exercises 1  --   --   --    Conditions Requiring Skilled Therapeutic Intervention   Assessment  --   --  Pt. requested to only stand using RW today, not try amb, due to pain and fatigue. Activity Tolerance   Activity Tolerance  --   --  Patient limited by endurance; Patient limited by pain; Patient limited by fatigue   Safety Devices   Type of devices  --   --   --       04/01/21 1122 04/01/21 1154 04/01/21 1156   Pain Screening   Patient Currently in Pain  --   --   --    Pain Assessment   Pain Assessment  --   --   --    Pain Level  --   --   --    Patient's Stated Pain Goal  --   --   --    Pain Type  --   --   --    Pain Location  --   --   --    Pain Orientation  --   --   --    Pain Descriptors  --   --   --    Functional Pain Assessment  --   --   --    Non-Pharmaceutical Pain Intervention(s)  --   --   --    Transfers   Sit to Stand  --   --   --    Stand to sit  --   --   --    Lateral Transfers  --  Minimal Assistance;Contact guard assistance  (W/C to Recliner using RW)  --    Other exercises   Other exercises 1 Sitting BLE ex  x10 reps/ 2 sets  --   --    Conditions Requiring Skilled Therapeutic Intervention   Assessment  --   --   --    Activity Tolerance   Activity Tolerance  --   --   -- Safety Devices   Type of devices  --   --  Call light within reach; Chair alarm in place   Electronically signed by Donna Oliva PTA on 4/1/2021 at 11:57 AM

## 2021-04-01 NOTE — PLAN OF CARE
Problem: Falls - Risk of:  Goal: Will remain free from falls  Description: Will remain free from falls  4/1/2021 1113 by Baljit Kumari RN  Outcome: Ongoing  4/1/2021 0127 by Marylene Devoid, RN  Outcome: Ongoing   Up with 1 assist with walker

## 2021-04-01 NOTE — PROGRESS NOTES
Infectious Diseases Progress Note    Patient:  Renu Babcock  YOB: 1955  MRN: [id number]   Admit date: 3/26/2021   Admitting Physician: Ritu Vang MD  Primary Care Physician: Hannah Nash MD    Chief Complaint/Interval History: She indicates she is feeling better. She was moving more with physical therapy. No fevers or chills. No pain at PICC site. No rash or skin itching. Oral intake remaining marginal.    In/Out    Intake/Output Summary (Last 24 hours) at 4/1/2021 0825  Last data filed at 4/1/2021 0572  Gross per 24 hour   Intake 870 ml   Output 50 ml   Net 820 ml     Allergies:    Allergies   Allergen Reactions    Amoxicillin Rash    Lipitor Rash    Niaspan [Niacin Er] Rash    Penicillins Rash    Relafen [Nabumetone] Rash    Zocor [Simvastatin] Rash     Muscle cramps     Current Meds: predniSONE (DELTASONE) tablet 5 mg, Daily  magnesium oxide (MAG-OX) tablet 400 mg, Daily  potassium chloride (KLOR-CON M) extended release tablet 20 mEq, Daily with breakfast  gabapentin (NEURONTIN) capsule 300 mg, Nightly  miconazole (MICOTIN) 2 % powder, PRN  traMADol (ULTRAM) tablet 50 mg, TID  sodium chloride flush 0.9 % injection 10 mL, PRN  sodium chloride flush 0.9 % injection 10 mL, BID  oxyCODONE (ROXICODONE) immediate release tablet 15 mg, 4x Daily  furosemide (LASIX) tablet 40 mg, Daily  levothyroxine (SYNTHROID) tablet 75 mcg, Daily  cloNIDine (CATAPRES) tablet 0.1 mg, BID PRN  acyclovir (ZOVIRAX) capsule 400 mg, BID  pantoprazole (PROTONIX) tablet 40 mg, Daily  docusate sodium (COLACE) capsule 100 mg, BID  therapeutic multivitamin-minerals 1 tablet, Daily  nitroGLYCERIN (NITROSTAT) SL tablet 0.4 mg, Q5 Min PRN  isosorbide mononitrate (IMDUR) extended release tablet 60 mg, BID  carvedilol (COREG) tablet 25 mg, BID WC  guaiFENesin (MUCINEX) extended release tablet 1,200 mg, BID  bisacodyl (DULCOLAX) suppository 10 mg, Daily PRN  budesonide (PULMICORT) nebulizer suspension 500 mcg, BID PRN cyclobenzaprine (FLEXERIL) tablet 10 mg, TID PRN  gabapentin (NEURONTIN) capsule 100 mg, Q12H  heparin (porcine) injection 5,000 Units, Q12H  lidocaine 4 % external patch 1 patch, Daily  magnesium hydroxide (MILK OF MAGNESIA) 400 MG/5ML suspension 30 mL, Daily PRN  naloxegol (MOVANTIK) tablet 25 mg, QAM  ondansetron (ZOFRAN) tablet 4 mg, Q6H PRN  polyethylene glycol (GLYCOLAX) packet 17 g, Daily  sennosides-docusate sodium (SENOKOT-S) 8.6-50 MG tablet 2 tablet, BID  acetaminophen (TYLENOL) tablet 650 mg, Q4H PRN  bisacodyl (DULCOLAX) suppository 10 mg, Daily PRN  ceFAZolin (ANCEF) 2,000 mg in sterile water 20 mL IV syringe, Q8H      Review of Systems    VitalSigns:  /72   Pulse 82   Temp 98 °F (36.7 °C) (Temporal)   Resp 16   Ht 5' 6\" (1.676 m)   Wt 197 lb 0.9 oz (89.4 kg)   SpO2 92%   BMI 31.81 kg/m²      Physical Exam  Line/IV site: No erythema, warmth, induration, or tenderness. Sitting up to chair  Smiling and interactive  Right hip with dressing in place  No new findings on exam    Lab Results:  CBC:   Recent Labs     04/01/21  0405   WBC 5.3   HGB 8.6*        BMP:  Recent Labs     04/01/21  0405      K 4.0   CL 98   CO2 32*   BUN 12   CREATININE 0.5   GLUCOSE 91     Radiology: None    Additional Studies Reviewed:  None    Impression:  1. MSSA bacteremia-stable under treatment  2. Right psoas abscess  3. Discitis/osteomyelitis L1/L2  For. Previous cervical spine stenosis-she had undergone decompression at [facility]  5. Rheumatoid arthritis  6. History of pacemaker placement  7.  History of wound infection at gluteal muscle tear and repair site    Recommendations:  Continue treatment with cefazolin  Continue physical therapy and Occupational Therapy  Planning cefazolin through April 8, 2021 which would be 8 weeks following clearing of blood cultures or possibly May 5, 2021 which would be 12 weeks following clearing of blood cultures  Continue weekly labs including CMP, CBC and Cheyenne Ravi MD

## 2021-04-01 NOTE — PROGRESS NOTES
Patient:   Kiana Brock  MR#:    [id number]   Room:    0820/820-01   YOB: 1955  Date of Progress Note: 4/1/2021  Time of Note                           9:06 AM  Consulting Physician:   Fiorella Anders M.D. Attending Physician:  Fiorella Anders MD     CHIEF COMPLAINT: Generalized weakness and deconditioning     Subjective  This 77 y.o. female  admitted to [facility] initially on 1/14/2021. The patient had been in her usual state of health when she developed a torn right gluteal muscle. She underwent surgical repair on 1/14/21 and post-operative course was progressing. At her 4 week follow up, she developed increased bloody drainage from the wound with erythema. She presented for injection due to RA at which time she suffered a syncopal event. This was felt to be a vasovagal response and she returned to home. She was found later by a family member in the floor for an unknown length of time and the dressing to the right hip/gluteal wound was saturated. She presented to the hospital 2/8 and was noted to be febrile on intake. Workup revealed an elevated CPK and d dimer. Wound cultures were obtained. The patient was admitted to the service of the hospitalists at that time. She underwent pacemaker interrogation in workup for her syncopal episodes which detected no abnormalities. MRI brain negative for acute process. Purulent drainage from the wound was cultured and the patient was started on broad spectrum antibiotics. She was seen in consultation by orthopedics. Blood cultures returned positive for MSSA in all bottles. ID was consulted for antibiotic management at that time. She underwent debridement of the wound per ortho on 2/9. Post-operatively, the wound measures 20 cm x 6 cm and wound vac therapy was initiated. Surveillance cultures remained positive and surgical wound cultures positive for e. Coli. She continued with antibiotic therapy with Azactam under direction of ID.  Due to persistent bacteremia, she underwent ALFREDA which did not reveal any valvular vegetations. Due to her need for continued IV antibiotic therapy, wound care and rehabilitation, she transferred to the [facility]. She was seen in consultation by our wound care team and was followed by ID. She developed increased complaints of back pain and imaging revealed a right psoas abscess as well as possible L1-2 osteomyelitis discitis. Inflammatory markers were also elevated. The patient had also complained of increased weakness and incoordination to bilateral upper extremities. Neurosurgery was consulted and recommended cervical decompression as well as continued antibiotic therapy with plans for extension of lumbar fusion. She transferred to [facility] on 3/3 and underwent cervical corpectomy and decompression. She tolerated the procedure without difficulty and returned to our facility on 3/6. The patient was initially progressing well with therapy upon her return, but she developed increased right hip pain impeding further progress. Multiple regimen adjustments were undertaken without relief. She continued on antibiotic treatment under the direction of ID and continued wound care under the direction of our wound care team. She developed increased edema to BLE requiring gentle diuresis. Orthopedic surgery was consulted to evaluate the hip who felt it was more likely related to the persistent psoas abscess noted on imaging. Originally, the patient was scheduled for repeat drainage of the abscess on 3/19, but she was going to require sedation and had eaten breakfast. The procedure was then rescheduled for 3/22. Neurosurgery then did an L1-L2 discectomy on 3/23/21 for ongoing discitis osteomyelitis in that region that has thus far not responded to antibiotic therapy. Following I and D of psoas abscess, the patient was discharge from the [facility] to [facility] for planned neurosurgical procedure. Pt tolerated procedure well. She was fitted for a LSO brace.   Chuyita Sers consulted for antibiotic recommendations. Complaining of pain in the sternum where her c-collar rests. There is reproducible pain to palpation consistent with costochondritis. Skin looks normal.  REVIEW OF SYSTEMS:  Constitutional: No fevers No chills  Neck:No stiffness  Respiratory: No shortness of breath  Cardiovascular: No chest pain No palpitations  Gastrointestinal: No abdominal pain    Genitourinary: No Dysuria  Neurological: No headache, no confusion      PHYSICAL EXAM:  /72   Pulse 82   Temp 98 °F (36.7 °C) (Temporal)   Resp 16   Ht 5' 6\" (1.676 m)   Wt 197 lb 0.9 oz (89.4 kg)   SpO2 92%   BMI 31.81 kg/m²     Constitutional: she appears well-developed and well-nourished. Eyes  conjunctiva normal.  Pupils react to light  Ear, nose, throat -hearing intact to voice. No scars, masses, or lesions over external nose or ears, no atrophy of tongue  Neck-symmetric, no masses noted, no jugular vein distension  Respiration- chest wall appears symmetric, good expansion,   normal effort without use of accessory muscles  Cardiovascular- RRR  Musculoskeletal  no significant wasting of muscles noted, no bony deformities, gait no gross ataxia  Extremities-no clubbing, cyanosis or edema  Skin  warm, dry, and intact. No rash, erythema, or pallor. Psychiatric  mood, affect, and behavior appear normal.      Neurology  NEUROLOGICAL EXAM:  Awake, alert, fluent oriented x 3 appropriate affect  Attention and concentration appear appropriate  Recent and remote memory appears unremarkable  Speech normal without dysarthria  No clear issues with language of fund of knowledge     Cranial Nerve Exam   CN II- Visual fields grossly unremarkable  CN III, IV,VI-EOMI, No nystagmus, conjugate eye movements, no ptosis  CN VII-no facial assymetry  CN VIII-Hearing intact        Motor Exam  RA deformities noted in the hands.   There is mild generalized weakness and right hip weakness which is partly antalgic.          Tremors- no tremors in hands or head noted     Gait  Not tested      Nursing/pcp notes, imaging,labs and vitals reviewed. PT,OT and/or speech notes reviewed    Lab Results   Component Value Date    WBC 5.3 04/01/2021    HGB 8.6 (L) 04/01/2021    HCT 28.8 (L) 04/01/2021    MCV 93.8 04/01/2021     04/01/2021     Lab Results   Component Value Date     04/01/2021    K 4.0 04/01/2021    CL 98 04/01/2021    CO2 32 (H) 04/01/2021    BUN 12 04/01/2021    CREATININE 0.5 04/01/2021    GLUCOSE 91 04/01/2021    CALCIUM 8.5 (L) 04/01/2021    PROT 5.4 (L) 03/29/2021    LABALBU 2.5 (L) 03/29/2021    BILITOT 0.5 03/29/2021    ALKPHOS 102 03/29/2021    AST 11 03/29/2021    ALT <5 (A) 03/29/2021    LABGLOM >60 04/01/2021    GFRAA >59 04/01/2021     Lab Results   Component Value Date    INR 1.20 (H) 12/05/2018    INR 1.14 09/04/2018    INR 1.08 07/10/2018     Lab Results   Component Value Date    CRP 13.94 (H) 03/29/2021     PHYSICAL THERAPY  STRENGTH  Strength RLE  Comment: 2-/5 HIP, 3/5 KNEE, 3+/5 ANKLE  ROM  AROM RLE (degrees)  RLE General AROM: DECREASED AT HIP  AROM LLE (degrees)  LLE AROM : WFL  BED MOBILITY  Bed Mobility  Rolling: Minimal assistance(TO THE RIGHT )  Supine to Sit: Moderate assistance  Sit to Supine: Minimal assistance(Reverse log roll)  Scooting: Contact guard assistance  Comment: log roll  TRANSFERS  Transfers  Sit to Stand: Contact guard assistance, Minimal Assistance(pull to  parallel bars )  Stand to sit: Contact guard assistance  Bed to Chair: Minimal assistance, Contact guard assistance  Stand Pivot Transfers: Contact guard assistance(RW)  Comment: BUE on w/c going sit to stand. Cuing to use UE going stand to sit.   WHEELCHAIR PROPULSION  Propulsion 1  Propulsion: Manual  Level: Level Tile  Method: RUBINA FITZPATRICK  Level of Assistance: Stand by assistance, Contact guard assistance  Description/ Details: 2 TURNS  Distance: 50  AMBULATION  Ambulation 1  Surface: level tile  Device: Parallel and L1/2 discitis/osteomyelitis with generalized weakness and deconditioning. Continue Ancef for another 8 to 12 weeks most likely. PT/OT for generalized weakness and deconditioning. Continue wound VAC for right gluteal tear/repair  2. GIcontinue with medications for constipation  3. DVT prophylaxissubcu heparin  4. Rheumatoid arthritislow-dose prednisone  5. Coronary artery diseasecontinue Imdur  6.   Hypomagnesemia/hypokalemiareplacement ordered  Appreciate ID assistance  Added 300 mg gabapentin at bedtime to help her get through the night  Prednisone increased to 5mg  Lidoderm patch for costochondritis  ELOS:4/12, likely snf

## 2021-04-01 NOTE — PROGRESS NOTES
Occupational Therapy  Facility/Department: [facility] 8 REHAB UNIT  Daily Treatment Note  NAME: Tariq Jessica  : 1955  MRN: [id number]    Date of Service: 2021    Discharge Recommendations:  Continue to assess pending progress       Assessment   Performance deficits / Impairments: Decreased high-level IADLs;Decreased functional mobility ; Decreased endurance;Decreased ADL status; Decreased balance;Decreased strength;Decreased coordination;Decreased sensation  Treatment Diagnosis: R Gluteal abscess, recent cervical decompression, MSSA on long-term antibiotics due to recent discitis,, L1-2 discectomy  Activity Tolerance  Activity Tolerance: Patient Tolerated treatment well  Safety Devices  Safety Devices in place: Yes  Type of devices: Left in chair;Call light within reach         Patient Diagnosis(es): There were no encounter diagnoses. has a past medical history of CAD (coronary artery disease), Cellulitis, History of blood transfusion, History of DVT (deep vein thrombosis), History of neutropenia, History of pulmonary fibrosis, Hx of blood clots, Hypercholesterolemia, Hypertension, Intrinsic asthma, Pacemaker, Post op infection, Rheumatoid arteritis ([address] Sinus node dysfunction ([address] Staph aureus infection, Status post placement of implantable loop recorder, Syncope, and Thyroid disease. has a past surgical history that includes Diagnostic Cardiac Cath Lab Procedure; LEEP; Coronary angioplasty with stent (); Pacemaker insertion (2016); back surgery (2018); pr total knee arthroplasty (Right, 2018); pr incis/drain thigh/knee abscess,deep (Right, 10/23/2018); Cardiac catheterization (14  MDL); Cardiac catheterization (1/26/15  MDL); pacemaker placement; joint replacement; and Revision total knee arthroplasty (Right, 2018).     Restrictions  Restrictions/Precautions  Restrictions/Precautions: Fall Risk, Weight Bearing  Required Braces or Orthoses?: Yes  Lower Extremity Weight Bearing Restrictions  Right Lower Extremity Weight Bearing: Weight Bearing As Tolerated  Left Lower Extremity Weight Bearing: Weight Bearing As Tolerated  Required Braces or Orthoses  Cervical: c-collar  Spinal: Lumbar Corset  Spinal Other: LSO  Position Activity Restriction  Spinal Precautions: No Bending, No Lifting, No Twisting       Objective     Communication Interventions  Communication: Yes  Writing: writing tasks with Good coordination  Balance  Sitting Balance: Supervision  Standing Balance: Minimal assistance     Transfers  Stand Step Transfers: Minimal assistance(using RW)  Sit to stand: Moderate assistance  Stand to sit: Contact guard assistance  Transfer Comments: recliner>w/c using RW        Plan   Plan  Current Treatment Recommendations: Self-Care / ADL, Safety Education & Training, Pain Management, Endurance Training, Functional Mobility Training, Strengthening, Patient/Caregiver Education & Training, Home Management Training, Equipment Evaluation, Education, & procurement, Balance Training, Positioning       OutComes Score       04/01/21 1000   Oral Hygiene   Assistance Needed Setup or clean-up assistance   CARE Score 5   Lower Body Dressing   Discharge Goal 3   Putting On/Taking Off Footwear   Comment VC/supervision with donning/doffing socks using AE, Max A with shoes, attempted shoe horn, but unable.        Goals  Short term goals  Time Frame for Short term goals: 1 week  Short term goal 1: MET  Short term goal 2: pt will complete LB dressing with  min A  Short term goal 3: pt will complete overall bathing with min A  Short term goal 4: pt will complete simple ambulatory home making task with min A  Short term goal 5: pt will don/doff back brace with independence  Short term goal 6: pt will complete 1-2 handed standing task for 3 mins with CGA  Short term goal 7: pt will complete R FM/sensory acts to improve function of hand during ADL task  Short term goal 8: Pt will increase B  strength by 2#.  Short term goal 9: Pt will decrease B 9 hole peg test time by 2 seconds.   Long term goals  Time Frame for Long term goals : 2 weeks  Long term goal 1: complete overall dressing with supervision  Long term goal 2: complete overall bathing with supervision  Long term goal 3: complete overall toileting with supervision  Long term goal 4: complete simple ambulatory home making task with supervision  Long term goal 5: complete HEP with independence       Therapy Time   Individual Concurrent Group Co-treatment   Time In 1000         Time Out 1100         Minutes 60         Timed Code Treatment Minutes: [address], OT

## 2021-04-02 ENCOUNTER — APPOINTMENT (OUTPATIENT)
Dept: GENERAL RADIOLOGY | Age: 66
DRG: 948 | End: 2021-04-02
Attending: PSYCHIATRY & NEUROLOGY
Payer: MEDICARE

## 2021-04-02 PROCEDURE — 99232 SBSQ HOSP IP/OBS MODERATE 35: CPT | Performed by: PSYCHIATRY & NEUROLOGY

## 2021-04-02 PROCEDURE — 6370000000 HC RX 637 (ALT 250 FOR IP): Performed by: PSYCHIATRY & NEUROLOGY

## 2021-04-02 PROCEDURE — 73521 X-RAY EXAM HIPS BI 2 VIEWS: CPT

## 2021-04-02 PROCEDURE — 1180000000 HC REHAB R&B

## 2021-04-02 PROCEDURE — 97535 SELF CARE MNGMENT TRAINING: CPT

## 2021-04-02 PROCEDURE — 97110 THERAPEUTIC EXERCISES: CPT

## 2021-04-02 PROCEDURE — 6360000002 HC RX W HCPCS: Performed by: PSYCHIATRY & NEUROLOGY

## 2021-04-02 PROCEDURE — 97606 NEG PRS WND THER DME>50 SQCM: CPT

## 2021-04-02 PROCEDURE — 2580000003 HC RX 258: Performed by: PSYCHIATRY & NEUROLOGY

## 2021-04-02 PROCEDURE — 97130 THER IVNTJ EA ADDL 15 MIN: CPT

## 2021-04-02 PROCEDURE — 97116 GAIT TRAINING THERAPY: CPT

## 2021-04-02 PROCEDURE — 97530 THERAPEUTIC ACTIVITIES: CPT

## 2021-04-02 PROCEDURE — 97129 THER IVNTJ 1ST 15 MIN: CPT

## 2021-04-02 RX ORDER — METOCLOPRAMIDE 5 MG/1
5 TABLET ORAL
Status: DISCONTINUED | OUTPATIENT
Start: 2021-04-02 | End: 2021-04-11

## 2021-04-02 RX ADMIN — SODIUM CHLORIDE, PRESERVATIVE FREE 10 ML: 5 INJECTION INTRAVENOUS at 05:50

## 2021-04-02 RX ADMIN — TRAMADOL HYDROCHLORIDE 50 MG: 50 TABLET, FILM COATED ORAL at 14:54

## 2021-04-02 RX ADMIN — ISOSORBIDE MONONITRATE 60 MG: 60 TABLET, EXTENDED RELEASE ORAL at 08:21

## 2021-04-02 RX ADMIN — TRAMADOL HYDROCHLORIDE 50 MG: 50 TABLET, FILM COATED ORAL at 22:00

## 2021-04-02 RX ADMIN — MAGNESIUM OXIDE TAB 400 MG (240 MG ELEMENTAL MG) 400 MG: 400 (240 MG) TAB at 08:22

## 2021-04-02 RX ADMIN — OXYCODONE 15 MG: 5 TABLET ORAL at 21:59

## 2021-04-02 RX ADMIN — POTASSIUM CHLORIDE 20 MEQ: 1500 TABLET, EXTENDED RELEASE ORAL at 08:20

## 2021-04-02 RX ADMIN — OXYCODONE 15 MG: 5 TABLET ORAL at 11:58

## 2021-04-02 RX ADMIN — Medication 2000 MG: at 15:56

## 2021-04-02 RX ADMIN — GABAPENTIN 300 MG: 300 CAPSULE ORAL at 21:59

## 2021-04-02 RX ADMIN — GUAIFENESIN 1200 MG: 600 TABLET, EXTENDED RELEASE ORAL at 08:20

## 2021-04-02 RX ADMIN — DOCUSATE SODIUM 50 MG AND SENNOSIDES 8.6 MG 2 TABLET: 8.6; 5 TABLET, FILM COATED ORAL at 14:54

## 2021-04-02 RX ADMIN — SODIUM CHLORIDE, PRESERVATIVE FREE 10 ML: 5 INJECTION INTRAVENOUS at 22:06

## 2021-04-02 RX ADMIN — METOCLOPRAMIDE 5 MG: 5 TABLET ORAL at 14:54

## 2021-04-02 RX ADMIN — SODIUM CHLORIDE, PRESERVATIVE FREE 10 ML: 5 INJECTION INTRAVENOUS at 08:40

## 2021-04-02 RX ADMIN — TRAMADOL HYDROCHLORIDE 50 MG: 50 TABLET, FILM COATED ORAL at 08:22

## 2021-04-02 RX ADMIN — Medication 1 TABLET: at 08:20

## 2021-04-02 RX ADMIN — PREDNISONE 30 MG: 20 TABLET ORAL at 08:21

## 2021-04-02 RX ADMIN — PANTOPRAZOLE SODIUM 40 MG: 40 TABLET, DELAYED RELEASE ORAL at 08:20

## 2021-04-02 RX ADMIN — FUROSEMIDE 40 MG: 40 TABLET ORAL at 08:22

## 2021-04-02 RX ADMIN — POLYETHYLENE GLYCOL 3350 17 G: 17 POWDER, FOR SOLUTION ORAL at 08:19

## 2021-04-02 RX ADMIN — GABAPENTIN 100 MG: 100 CAPSULE ORAL at 08:21

## 2021-04-02 RX ADMIN — LEVOTHYROXINE SODIUM 75 MCG: 75 TABLET ORAL at 05:50

## 2021-04-02 RX ADMIN — NALOXEGOL OXALATE 25 MG: 12.5 TABLET, FILM COATED ORAL at 08:20

## 2021-04-02 RX ADMIN — WATER 2000 MG: 1 INJECTION INTRAMUSCULAR; INTRAVENOUS; SUBCUTANEOUS at 05:50

## 2021-04-02 RX ADMIN — DOCUSATE SODIUM 100 MG: 100 CAPSULE, LIQUID FILLED ORAL at 22:00

## 2021-04-02 RX ADMIN — GABAPENTIN 100 MG: 100 CAPSULE ORAL at 22:05

## 2021-04-02 RX ADMIN — ACYCLOVIR 400 MG: 200 CAPSULE ORAL at 08:20

## 2021-04-02 RX ADMIN — GUAIFENESIN 1200 MG: 600 TABLET, EXTENDED RELEASE ORAL at 22:00

## 2021-04-02 RX ADMIN — OXYCODONE 15 MG: 5 TABLET ORAL at 08:19

## 2021-04-02 RX ADMIN — ISOSORBIDE MONONITRATE 60 MG: 60 TABLET, EXTENDED RELEASE ORAL at 22:00

## 2021-04-02 RX ADMIN — HEPARIN SODIUM 5000 UNITS: 5000 INJECTION INTRAVENOUS; SUBCUTANEOUS at 22:00

## 2021-04-02 RX ADMIN — HEPARIN SODIUM 5000 UNITS: 5000 INJECTION INTRAVENOUS; SUBCUTANEOUS at 08:31

## 2021-04-02 RX ADMIN — CARVEDILOL 25 MG: 25 TABLET, FILM COATED ORAL at 16:50

## 2021-04-02 RX ADMIN — DOCUSATE SODIUM 50 MG AND SENNOSIDES 8.6 MG 2 TABLET: 8.6; 5 TABLET, FILM COATED ORAL at 08:19

## 2021-04-02 RX ADMIN — OXYCODONE 15 MG: 5 TABLET ORAL at 16:50

## 2021-04-02 RX ADMIN — CARVEDILOL 25 MG: 25 TABLET, FILM COATED ORAL at 08:21

## 2021-04-02 RX ADMIN — DOCUSATE SODIUM 100 MG: 100 CAPSULE, LIQUID FILLED ORAL at 08:21

## 2021-04-02 RX ADMIN — ACYCLOVIR 400 MG: 200 CAPSULE ORAL at 22:00

## 2021-04-02 ASSESSMENT — PAIN DESCRIPTION - FREQUENCY: FREQUENCY: CONTINUOUS

## 2021-04-02 ASSESSMENT — PAIN SCALES - GENERAL
PAINLEVEL_OUTOF10: 4
PAINLEVEL_OUTOF10: 0

## 2021-04-02 ASSESSMENT — PAIN DESCRIPTION - PAIN TYPE: TYPE: ACUTE PAIN

## 2021-04-02 ASSESSMENT — PAIN DESCRIPTION - DESCRIPTORS: DESCRIPTORS: DISCOMFORT

## 2021-04-02 ASSESSMENT — PAIN - FUNCTIONAL ASSESSMENT: PAIN_FUNCTIONAL_ASSESSMENT: PREVENTS OR INTERFERES SOME ACTIVE ACTIVITIES AND ADLS

## 2021-04-02 ASSESSMENT — PAIN DESCRIPTION - ORIENTATION: ORIENTATION: RIGHT

## 2021-04-02 NOTE — PROGRESS NOTES
[facility]  INPATIENT SPEECH THERAPY  [facility] 8 REHAB UNIT        TIME   Time In: 0800  Time Out: 0900  Minutes: 60       [x]Daily Note  []Progress Note    Date: 2021  Patient Name: Carmita Otoole        MRN: [id number]    Account #: [de-identified]  : 1955  (76 y.o.)  Gender: female   Primary Provider: Jake Ann MD  Swallowing Status/Diet: Soft diet, thin liquids      PATIENT DIAGNOSIS(ES):    Diagnosis: NONTRAUMATIC SPINAL CORD S/P L1-2 DISCECTOMY. I&D PSOAS ABCESS     Additional Pertinent Hx: RECENT HX OF CX SX, BACTEREMIA  RESTRICTIONS/PRECAUTIONS:    Restrictions/Precautions  Restrictions/Precautions: Fall Risk, Weight Bearing  Required Braces or Orthoses?: Yes  Position Activity Restriction  Spinal Precautions: No Bending, No Lifting, No Twisting      Subjective: The patient was sitting at the edge of the bed. She complained of 5 out of 10 pain and her nurse administered pain meds. She wal alert and oriented x4. Objective:  Nurse suggested possibly taking Zofran prior to meds. Recommend consider reflux meds as well as backflow has been audible. Today, SLP again witnessed emesis following medication administration. She has numerous pills she takes followed by water. Audible gurgly sounds as well as audible backflow of material have been witnessed by this therapist. She states this does occasionally occur at home. She states she wakes up, eats breakfast, then takes her meds followed by water. At home, she takes her medications with water and suffers from emesis at times. Today she was taking her medications whole in applesauce. The applesauce was replaced with vanilla pudding. (concern that the acidity in the applesauce was causing the emesis today). Her MBSS on 3/31/21 showed: Thin liquids via cup resulted in laryngeal penetration above the vocal folds with ejection. Thin liquids via cup were presented twice, and no penetration was noted.  Thin liquids via straw were presented four times and two presentations resulted in trace laryngeal penetration above the vocal folds with ejection from the airway. Penetration was noted before and during the swallow due to premature loss over the tongue base. Puree and pudding were tolerated well. Mechanical soft resulted in the valleculae filling with material and spilling to the pyriforms. Solids resulted in material filling the valleculae. Minimal base of tongue was noted. Minimal vallecular residue noted after dry swallows. Will review her MBSS again to insure no esophageal issues were present. Recommend consider possible GI consult. Did contact her physician regarding continued emesis following morning meds.      The patient completed portions of the CLQT today. She exhibited difficulty with clock drawing tasks, symbol trails, mazes, and symbol cancellation. Will complete the CLQT during a future session and will provide her scores. Recommended Diet:  Solid consistency: Regular  Liquid consistency: Thin  Liquid administration via: Cup;Straw     Medication administration: (Meds whole with water)     Safe Swallow Protocol:  Compensatory Swallowing Strategies: Alternate solids and liquids;Upright as possible for all oral intake;Remain upright for 30-45 minutes after meals          SHORT TERM GOAL #1:  Goal 1: The patient will provide 15 members in a given category with 90% accuracy and (min)cues in the form of a visual aid, semantic/phonemic cueing, etc.    SHORT TERM GOAL #2:  Goal 2: The patient will demonstrate functional problem solving and safety awareness with 90% verbal,tactile and visual cues for daily living tasks in order to increase safe interaction with environmentand decreased assistance from caregivers    [address] #3:  Goal 3: The patient will demonstrate recall of functional information following a/an (immediate, shortterm,long-term) delay with min cues in order to increase functional integration into environment.     SHORT TERM GOAL #4:  Goal 4: The patient will recall 8 facts from (sentence, paragraph, several paragraph, page) length material with 90% accuracy and min cueing. Swallowing Short Term Goals  Short-term Goals  Goal 1: The patient will tolerate a dysphagia soft and bite sized diet with thin liquids with minimal overt s/s of aspiration. Goal 2: Staff/caregivers will complete daily oral hygiene to prevent aspiration of bacteria laden secretions. Goal 3: The patient will participate in a modified barium swallow study to futher assess swallow function.  met    Comprehension: 4 - Patient understands basic needs 75-90%+ of the time  Expression: 4 - Expresses basic ideas/needs 75-90%+ of the time  Social Interaction: 4 - Patient appropriate 75-90%+ of the time  Problem Solving: 3 - Patient solves simple/routine tasks 50%-74%  Memory: 3 - Patient remembers 50%-74% of the time    ASSESSMENT:  Assessment: [x]Progressing towards goals          []Not Progressing towards goals    Patient Tolerance of Treatment:   [x]Tolerated well []Tolerated fair []Required rest breaks []Fatigued    Education:  Learner:  [x]Patient          []Significant Other          []Other  Education provided regarding:  [x]Goals and POC   []Diet and swallowing precautions    []Home Exercise Program  []Progress and/or discharge information  Method of Education:  []Discussion          []Demonstration          []Handout          []Other  Evaluation of Education:   [x]Verbalized understanding   []Demonstrates without assistance  []Demonstrates with assistance  []Needs further instruction     []No evidence of learning                  []No family present      Plan: [x]Continue with current plan of care    []Modify current plan of care as follows:    []Discharge patient    Discharge Location:    Services/Supervision Recommended:      [x]Patient continues to require treatment by a licensed therapist to address functional deficits as outlined in the established plan of care.    Electronically Signed By:  Jono Wilks M.S., CCC-SLP  4/2/2021,8:40 AM.                      Electronically Signed By:  Jono TELLO  4/2/2021,8:27 AM.

## 2021-04-02 NOTE — PLAN OF CARE
Nutrition Problem #1: Inadequate oral intake  Intervention: Food and/or Nutrient Delivery: Continue Current Diet, Modify Oral Nutrition Supplement  Nutritional Goals: PO intake >50%, wt stable    Problem: Nutrition  Goal: Optimal nutrition therapy  Outcome: Ongoing

## 2021-04-02 NOTE — PROGRESS NOTES
[facility]  Follow-up Progress Note       NAME:  Juan Reynolds  MEDICAL RECORD NUMBER:  [id number]  AGE:  77 y.o.    GENDER:  female  :  1955  TODAY'S DATE:  2021    Subjective:   Wound Identification:  Wound Type: Surgical  Contributing Factors: none        Patient Goal of Care:  [] Wound Healing  [] Odor Control  [] Palliative Care  [] Pain Control   [] Other:     Objective:    /83   Pulse 60   Temp 98.4 °F (36.9 °C) (Temporal)   Resp 16   Ht 5' 6\" (1.676 m)   Wt 197 lb 0.9 oz (89.4 kg)   SpO2 92%   BMI 31.81 kg/m²   Varinder Risk Score: Varinder Scale Score: 17  Assessment:   Measurements:  Negative Pressure Wound Therapy Leg Anterior;Proximal;Right;Upper (Active)   $ Standard NPWT >50 sq cm PER TX $ Yes 21 1355   Wound Type Surgical 21 1355   Unit Type 3M KCI 21 1355   Dressing Type Black foam 21 1355   Number of pieces used 4 21 1355   Cycle Continuous 21 1355   Target Pressure (mmHg) 125 21 1355   Dressing Status Changed 21 1355   Dressing Changed Changed/New 21 1355   Drainage Amount Moderate 21 1355   Drainage Description Serosanguinous 21 1355   Dressing Change Due 21 1355   Output (ml) 50 ml 21 0840   Wound Assessment Granulation tissue;Red;Slough 21 1355   Aixa-wound Assessment Intact 21 1355   Shape Oval 21 1355   Odor None 21 1355   Number of days: 4       Wound 21 Thigh Proximal;Right;Lateral open incision, deep (Active)   Wound Image   21 1159   Wound Etiology Surgical 21 2155   Dressing Status Other (Comment) 21 0840   Wound Cleansed Irrigated with saline;Cleansed with saline 21 1159   Dressing/Treatment Negative pressure wound therapy 21 0840   Dressing Change Due 21 0840   Wound Length (cm) 18.5 cm 03/29/21 1159   Wound Width (cm) 8.5 cm 03/29/21 1159   Wound Depth (cm) 10 cm 03/29/21 1159   Wound Surface Area (cm^2) 157.25 cm^2 03/29/21 1159   Wound Volume (cm^3) 1572.5 cm^3 03/29/21 1159   Wound Assessment Exposed structure muscle;Granulation tissue;Slough 03/29/21 1159   Drainage Amount Moderate 04/01/21 0840   Drainage Description Serosanguinous 04/01/21 0840   Odor None 03/29/21 1159   Aixa-wound Assessment Maceration; Other (Comment) 03/29/21 1159   Margins Attached edges 03/29/21 1159   Wound Thickness Description not for Pressure Injury Full thickness 03/29/21 1159   Number of days: 5       Response to treatment:  Well tolerated by patient. Pain Assessment:  Severity:  3 / 10  Quality of pain: tender  Wound Pain Timing/Severity: intermittent  Premedicated: Yes  Plan:   Plan of Care: Wound 03/27/21 Thigh Proximal;Right;Lateral open incision, deep-Dressing/Treatment: Negative pressure wound therapy    Specialty Bed Required : N/A   [] Low Air Loss   [] Pressure Redistribution  [] Fluid Immersion  [] Bariatric  [] Total Pressure Relief  [] Other:     Current Diet: Dietary Nutrition Supplements: Standard High Calorie Oral Supplement, Wound Healing Oral Supplement  DIET GENERAL; Dental Soft  Dietician consult:  Yes    Discharge Plan:  Placement for patient upon discharge: unknown at this time   Patient appropriate for Outpatient [facility]: Yes    Referrals:  []   [] [address] [place] [address]  [] Supplies  [] Other    Patient/Caregiver Teaching:  Level of patient/caregiver understanding able to:   [] Indicates understanding       [] Needs reinforcement  [] Unsuccessful      [] Verbal Understanding  [] Demonstrated understanding       [] No evidence of learning  [] Refused teaching         [x] N/A    Wound vac dressing changed. Old dressing removed. Wound and periwound cleaned with soap and water. Wound rinsed with NS.  Wound bed is red granulation with small amount of slough (<10%) to the posterior edge of the wound. Antifungal powder used with skin prep in crusting technique to periwound skin. Skin and yeast is clearing up well. Wound window-paned with vac dape. Black foam to wound bed and filled the depth of the wound. All sealed with drape and negative 125 mmHg applied. No leaks or alarms noted on wound vac. Patient tolerated will. There are a few areas that are tender to touch.     Electronically signed by Debora Perry RN, [facility] on 4/2/2021 at 1:58 PM

## 2021-04-02 NOTE — PROGRESS NOTES
Occupational Therapy  Facility/Department: [facility] 8 REHAB UNIT  Daily Treatment Note  NAME: Minor Cassidy  : 1955  MRN: [id number]    Date of Service: 2021    Discharge Recommendations:  Continue to assess pending progress       Assessment   Performance deficits / Impairments: Decreased high-level IADLs;Decreased functional mobility ; Decreased endurance;Decreased ADL status; Decreased balance;Decreased strength;Decreased coordination;Decreased sensation  Treatment Diagnosis: R Gluteal abscess, recent cervical decompression, MSSA on long-term antibiotics due to recent discitis,, L1-2 discectomy  Activity Tolerance  Activity Tolerance: Patient Tolerated treatment well  Safety Devices  Safety Devices in place: Yes  Type of devices: Call light within reach; Chair alarm in place         Patient Diagnosis(es): There were no encounter diagnoses. has a past medical history of CAD (coronary artery disease), Cellulitis, History of blood transfusion, History of DVT (deep vein thrombosis), History of neutropenia, History of pulmonary fibrosis, Hx of blood clots, Hypercholesterolemia, Hypertension, Intrinsic asthma, Pacemaker, Post op infection, Rheumatoid arteritis ([address] Sinus node dysfunction ([address] Staph aureus infection, Status post placement of implantable loop recorder, Syncope, and Thyroid disease. has a past surgical history that includes Diagnostic Cardiac Cath Lab Procedure; LEEP; Coronary angioplasty with stent (); Pacemaker insertion (2016); back surgery (2018); pr total knee arthroplasty (Right, 2018); pr incis/drain thigh/knee abscess,deep (Right, 10/23/2018); Cardiac catheterization (14  MDL); Cardiac catheterization (1/26/15  MDL); pacemaker placement; joint replacement; and Revision total knee arthroplasty (Right, 2018).     Restrictions  Restrictions/Precautions  Restrictions/Precautions: Fall Risk, Weight Bearing  Required Braces or Orthoses?: Yes  Lower Extremity Weight Bearing Restrictions  Right Lower Extremity Weight Bearing: Weight Bearing As Tolerated  Left Lower Extremity Weight Bearing: Weight Bearing As Tolerated  Required Braces or Orthoses  Cervical: c-collar  Spinal: Lumbar Corset  Spinal Other: LSO  Position Activity Restriction  Spinal Precautions: No Bending, No Lifting, No Twisting  Objective    Balance  Sitting Balance: Supervision  Standing Balance: Contact guard assistance  Functional Mobility  Functional - Mobility Device: Rolling Walker  Activity: Other  Assist Level: Contact guard assistance  Functional Mobility Comments: Short distance     Transfers  Stand Step Transfers: Contact guard assistance  Sit to stand: Contact guard assistance  Stand to sit: Contact guard assistance  Transfer Comments: to/from RW,w/c<>stretcher              Plan   Plan  Current Treatment Recommendations: Self-Care / ADL, Safety Education & Training, Pain Management, Endurance Training, Functional Mobility Training, Strengthening, Patient/Caregiver Education & Training, Home Management Training, Equipment Evaluation, Education, & procurement, Balance Training, Positioning    Goals  Short term goals  Time Frame for Short term goals: 1 week  Short term goal 1: MET  Short term goal 2: pt will complete LB dressing with  min A  Short term goal 3: pt will complete overall bathing with min A  Short term goal 4: pt will complete simple ambulatory home making task with min A  Short term goal 5: pt will don/doff back brace with independence  Short term goal 6: pt will complete 1-2 handed standing task for 3 mins with CGA  Short term goal 7: pt will complete R FM/sensory acts to improve function of hand during ADL task  Short term goal 8: Pt will increase B  strength by 2#. Short term goal 9: Pt will decrease B 9 hole peg test time by 2 seconds.   Long term goals  Time Frame for Long term goals : 2 weeks  Long term goal 1: complete overall dressing with supervision  Long term goal 2: complete overall bathing with supervision  Long term goal 3: complete overall toileting with supervision  Long term goal 4: complete simple ambulatory home making task with supervision  Long term goal 5: complete HEP with independence       Therapy Time     04/02/21 1100   OT Individual Minutes   Time In 1100   Time Out 1115   Minutes 15   Time Code Minutes    Timed Code Treatment Minutes 15 Minutes        Individual Concurrent Group Co-treatment   Time In 3641         Time Out 1200         Minutes 15         Timed Code Treatment Minutes: Terry 36, OT

## 2021-04-02 NOTE — PLAN OF CARE
Problem: Falls - Risk of:  Goal: Will remain free from falls  Description: Will remain free from falls  4/2/2021 0013 by Kirstin Recinos LPN  Outcome: Ongoing  4/1/2021 1113 by Rg Sanchez RN  Outcome: Ongoing  Goal: Absence of physical injury  Description: Absence of physical injury  4/2/2021 0013 by Kirstin Recinos LPN  Outcome: Ongoing  4/1/2021 1113 by Rg Sanchez RN  Outcome: Ongoing     Problem: Pain:  Goal: Pain level will decrease  Description: Pain level will decrease  4/2/2021 0013 by Kirstin Recinos LPN  Outcome: Ongoing  4/1/2021 1113 by Rg Sanchez RN  Outcome: Ongoing  Goal: Control of acute pain  Description: Control of acute pain  4/2/2021 0013 by Kirstin Recinos LPN  Outcome: Ongoing  4/1/2021 1113 by Rg Sanchez RN  Outcome: Ongoing  Goal: Control of chronic pain  Description: Control of chronic pain  4/2/2021 0013 by Kirstin Recinos LPN  Outcome: Ongoing  4/1/2021 1113 by Rg Sanchez RN  Outcome: Ongoing     Problem: SAFETY  Goal: Free from accidental physical injury  4/2/2021 0013 by Kirstin Recinos LPN  Outcome: Ongoing  4/1/2021 1113 by Rg Sanchez RN  Outcome: Ongoing  Goal: Free from intentional harm  4/2/2021 0013 by Kirstin Recinos LPN  Outcome: Ongoing  4/1/2021 1113 by Rg Sanchez RN  Outcome: Ongoing  Goal: LTG - Patient will demonstrate safety requirements appropriate to situation/environment  4/2/2021 0013 by Kirstin Recinos LPN  Outcome: Ongoing  4/1/2021 1113 by Rg Sanchez RN  Outcome: Ongoing  Goal: LTG - patient will utilize safety techniques  4/2/2021 0013 by Kirstin Recinos LPN  Outcome: Ongoing  4/1/2021 1113 by Rg Sanchez RN  Outcome: Ongoing  Goal: STG - patient locks brakes on wheelchair  4/2/2021 0013 by Kirstin Recinos LPN  Outcome: Ongoing  4/1/2021 1113 by Rg Sanchez RN  Outcome: Ongoing  Goal: STG - Patient uses call light consistently to request assistance with transfers  4/2/2021 0015 by Dragan Chou, LPN  Outcome: Ongoing  4/1/2021 1113 by Baljit Kumari RN  Outcome: Ongoing  Goal: STG - patient uses gait belt during all transfers  4/2/2021 0013 by Dragan Chou, LPN  Outcome: Ongoing  4/1/2021 1113 by Baljit Kumari RN  Outcome: Ongoing     Problem: DAILY CARE  Goal: Daily care needs are met  4/2/2021 0013 by Dragan Chou, LPN  Outcome: Ongoing  4/1/2021 1113 by Baljit Kumari RN  Outcome: Ongoing     Problem: PAIN  Goal: Patient's pain/discomfort is manageable  4/2/2021 0013 by Dragan Chou, LPN  Outcome: Ongoing  4/1/2021 1113 by Baljit Kumari RN  Outcome: Ongoing  Goal: STG - pain is manageable through therapies  4/2/2021 0013 by Dragan Chou, LPN  Outcome: Ongoing  4/1/2021 1113 by Baljit Kumari RN  Outcome: Ongoing  Goal: STG - Patient will verbalize an acceptable level of pain  4/2/2021 0013 by Dragan Chou, LPN  Outcome: Ongoing  4/1/2021 1113 by Baljit Kumari RN  Outcome: Ongoing  Goal: STG - patients pain is managed to allow active participation in daily activities  4/2/2021 0013 by Dragan Chou, LPN  Outcome: Ongoing  4/1/2021 1113 by Baljit Kumari RN  Outcome: Ongoing     Problem: SKIN INTEGRITY  Goal: Skin integrity is maintained or improved  4/2/2021 0013 by Dragan Chou, LPN  Outcome: Ongoing  4/1/2021 1113 by Baljit Kumari RN  Outcome: Ongoing  Goal: LTG - Patient will be free from infection  4/2/2021 0013 by Dragan Chou, LPN  Outcome: Ongoing  4/1/2021 1113 by Baljit Kumari RN  Outcome: Ongoing  Goal: STG - Patient demonstrates skin care/treatment/dressing change  4/2/2021 0013 by Dragan Chou, LPN  Outcome: Ongoing  4/1/2021 1113 by Baljit Kumari RN  Outcome: Ongoing  Goal: STG - patient will maintain good skin integrity  4/2/2021 0013 by Dragan Chou, LPN  Outcome: Ongoing  4/1/2021 1113 by Baljit Kumari, RN  Outcome: Ongoing     Problem: KNOWLEDGE DEFICIT  Goal: Patient/S.O. demonstrates understanding of disease process, treatment plan, medications, and discharge instructions. 4/2/2021 0013 by Thea Glasgow LPN  Outcome: Ongoing  4/1/2021 1113 by Master Miranda RN  Outcome: Ongoing     Problem: DISCHARGE BARRIERS  Goal: Patient's continuum of care needs are met  4/2/2021 0013 by Thea Glasgow LPN  Outcome: Ongoing  4/1/2021 1113 by Master Miranda RN  Outcome: Ongoing     Problem: IP BLADDER/VOIDING  Goal: LTG - Patient will utilize adaptive techniques/equipment to complete bladder elimination  4/2/2021 0013 by Thea Glasgow LPN  Outcome: Ongoing  4/1/2021 1113 by Master Miranda RN  Outcome: Ongoing  Goal: STG - Patient demonstrates no accidents  4/2/2021 0013 by Thea Glasgow LPN  Outcome: Ongoing  4/1/2021 1113 by Master Miranda RN  Outcome: Ongoing  Goal: STG - patient participates in bladder program by expressing need to void  4/2/2021 0013 by Thea Glasgow LPN  Outcome: Ongoing  4/1/2021 1113 by Master Miranda RN  Outcome: Ongoing     Problem: IP BOWEL ELIMINATION  Goal: LTG - patient will have regular and routine bowel evacuation  4/2/2021 0013 by Thea Glasgow LPN  Outcome: Ongoing  4/1/2021 1113 by Master Miranda RN  Outcome: Ongoing     Problem: IP BREATHING  Goal: STG - respiratory rate and effort will be within normal limits for the patient  4/2/2021 0013 by Thea Glasgow LPN  Outcome: Ongoing  4/1/2021 1113 by Master Miranda RN  Outcome: Ongoing     Problem: NUTRITION  Goal: Patient/Family demonstrates understanding of diet  4/2/2021 0013 by Thea Glasgow LPN  Outcome: Ongoing  4/1/2021 1113 by Master Miranda RN  Outcome: Ongoing     Problem: Mobility - Impaired:  Goal: Mobility will improve  Description: Mobility will improve  4/2/2021 0013 by Thea Glasgow LPN  Outcome: Ongoing  4/1/2021 1113 by Master Miranda RN  Outcome: Ongoing     Problem:  Activity:  Goal: Ability to tolerate increased activity will improve Description: Ability to tolerate increased activity will improve  4/2/2021 0013 by Thea Glasgow LPN  Outcome: Ongoing  4/1/2021 1113 by Master Miranda RN  Outcome: Ongoing     Problem: Nutrition  Goal: Optimal nutrition therapy  4/2/2021 0013 by Thea Glasgow LPN  Outcome: Ongoing  4/1/2021 1113 by Master Miranda RN  Outcome: Ongoing     Problem: Bleeding:  Goal: Will show no signs and symptoms of excessive bleeding  Description: Will show no signs and symptoms of excessive bleeding  4/2/2021 0013 by Thea Glasgow LPN  Outcome: Ongoing  4/1/2021 1113 by Master Miranda RN  Outcome: Ongoing     Problem: Skin Integrity:  Goal: Will show no infection signs and symptoms  Description: Will show no infection signs and symptoms  4/2/2021 0013 by Thea Glasgow LPN  Outcome: Ongoing  4/1/2021 1113 by Master Miranda RN  Outcome: Ongoing  Goal: Absence of new skin breakdown  Description: Absence of new skin breakdown  4/2/2021 0013 by Thea Glasgow LPN  Outcome: Ongoing  4/1/2021 1113 by Master Miranda RN  Outcome: Ongoing     Problem: Infection - Surgical Site:  Goal: Will show no infection signs and symptoms  Description: Will show no infection signs and symptoms  4/2/2021 0013 by Thea Glasgow LPN  Outcome: Ongoing  4/1/2021 1113 by Master Miranda RN  Outcome: Ongoing

## 2021-04-02 NOTE — PROGRESS NOTES
Patient:   Matty Forbes  MR#:    [id number]   Room:    0820/820-01   YOB: 1955  Date of Progress Note: 4/2/2021  Time of Note                           8:43 AM  Consulting Physician:   Isa Ramirez M.D. Attending Physician:  Isa Ramirez MD     CHIEF COMPLAINT: Generalized weakness and deconditioning     Subjective  This 77 y.o. female  admitted to [facility] on 1/14/2021. The patient had been in her usual state of health when she developed a torn right gluteal muscle. She underwent surgical repair on 1/14/21 and post-operative course was progressing. At her 4 week follow up, she developed increased bloody drainage from the wound with erythema. She presented for injection due to RA at which time she suffered a syncopal event. This was felt to be a vasovagal response and she returned to home. She was found later by a family member in the floor for an unknown length of time and the dressing to the right hip/gluteal wound was saturated. She presented to the hospital 2/8 and was noted to be febrile on intake. Workup revealed an elevated CPK and d dimer. Wound cultures were obtained. The patient was admitted to the service of the hospitalists at that time. She underwent pacemaker interrogation in workup for her syncopal episodes which detected no abnormalities. MRI brain negative for acute process. Purulent drainage from the wound was cultured and the patient was started on broad spectrum antibiotics. She was seen in consultation by orthopedics. Blood cultures returned positive for MSSA in all bottles. ID was consulted for antibiotic management at that time. She underwent debridement of the wound per ortho on 2/9. Post-operatively, the wound measures 20 cm x 6 cm and wound vac therapy was initiated. Surveillance cultures remained positive and surgical wound cultures positive for e. Coli. She continued with antibiotic therapy with Azactam under direction of ID.  Due to persistent bacteremia, she underwent ALFREDA which did not reveal any valvular vegetations. Due to her need for continued IV antibiotic therapy, wound care and rehabilitation, she transferred to the [facility]. She was seen in consultation by our wound care team and was followed by ID. She developed increased complaints of back pain and imaging revealed a right psoas abscess as well as possible L1-2 osteomyelitis discitis. Inflammatory markers were also elevated. The patient had also complained of increased weakness and incoordination to bilateral upper extremities. Neurosurgery was consulted and recommended cervical decompression as well as continued antibiotic therapy with plans for extension of lumbar fusion. She transferred to [facility] on 3/3 and underwent cervical corpectomy and decompression. She tolerated the procedure without difficulty and returned to our facility on 3/6. The patient was initially progressing well with therapy upon her return, but she developed increased right hip pain impeding further progress. Multiple regimen adjustments were undertaken without relief. She continued on antibiotic treatment under the direction of ID and continued wound care under the direction of our wound care team. She developed increased edema to BLE requiring gentle diuresis. Orthopedic surgery was consulted to evaluate the hip who felt it was more likely related to the persistent psoas abscess noted on imaging. Originally, the patient was scheduled for repeat drainage of the abscess on 3/19, but she was going to require sedation and had eaten breakfast. The procedure was then rescheduled for 3/22. Neurosurgery then did an L1-L2 discectomy on 3/23/21 for ongoing discitis osteomyelitis in that region that has thus far not responded to antibiotic therapy. Following I and D of psoas abscess, the patient was discharge from the [facility] to [facility] for planned neurosurgical procedure. Pt tolerated procedure well. She was fitted for a LSO brace.   Bessie James consulted for antibiotic recommendations. Sternum pain is better. Complaining of a pop in her right hip with some pain with movement. REVIEW OF SYSTEMS:  Constitutional: No fevers No chills  Neck:No stiffness  Respiratory: No shortness of breath  Cardiovascular: No chest pain No palpitations  Gastrointestinal: No abdominal pain    Genitourinary: No Dysuria  Neurological: No headache, no confusion      PHYSICAL EXAM:  /83   Pulse 60   Temp 98.4 °F (36.9 °C) (Temporal)   Resp 16   Ht 5' 6\" (1.676 m)   Wt 197 lb 0.9 oz (89.4 kg)   SpO2 92%   BMI 31.81 kg/m²     Constitutional: she appears well-developed and well-nourished. Eyes  conjunctiva normal.  Pupils react to light  Ear, nose, throat -hearing intact to voice. No scars, masses, or lesions over external nose or ears, no atrophy of tongue  Neck-symmetric, no masses noted, no jugular vein distension  Respiration- chest wall appears symmetric, good expansion,   normal effort without use of accessory muscles  Cardiovascular- RRR  Musculoskeletal  no significant wasting of muscles noted, no bony deformities, gait no gross ataxia  Extremities-no clubbing, cyanosis or edema  Skin  warm, dry, and intact. No rash, erythema, or pallor. Psychiatric  mood, affect, and behavior appear normal.      Neurology  NEUROLOGICAL EXAM:  Awake, alert, fluent oriented x 3 appropriate affect  Attention and concentration appear appropriate  Recent and remote memory appears unremarkable  Speech normal without dysarthria  No clear issues with language of fund of knowledge     Cranial Nerve Exam   CN II- Visual fields grossly unremarkable  CN III, IV,VI-EOMI, No nystagmus, conjugate eye movements, no ptosis  CN VII-no facial assymetry  CN VIII-Hearing intact        Motor Exam  RA deformities noted in the hands.   There is mild generalized weakness and right hip weakness which is partly antalgic.           Tremors- no tremors in hands or head noted     Gait  Not tested      Nursing/pcp notes, imaging,labs and vitals reviewed. PT,OT and/or speech notes reviewed    Lab Results   Component Value Date    WBC 5.3 04/01/2021    HGB 8.6 (L) 04/01/2021    HCT 28.8 (L) 04/01/2021    MCV 93.8 04/01/2021     04/01/2021     Lab Results   Component Value Date     04/01/2021    K 4.0 04/01/2021    CL 98 04/01/2021    CO2 32 (H) 04/01/2021    BUN 12 04/01/2021    CREATININE 0.5 04/01/2021    GLUCOSE 91 04/01/2021    CALCIUM 8.5 (L) 04/01/2021    PROT 5.4 (L) 03/29/2021    LABALBU 2.5 (L) 03/29/2021    BILITOT 0.5 03/29/2021    ALKPHOS 102 03/29/2021    AST 11 03/29/2021    ALT <5 (A) 03/29/2021    LABGLOM >60 04/01/2021    GFRAA >59 04/01/2021     Lab Results   Component Value Date    INR 1.20 (H) 12/05/2018    INR 1.14 09/04/2018    INR 1.08 07/10/2018     Lab Results   Component Value Date    CRP 13.94 (H) 03/29/2021     PHYSICAL THERAPY  STRENGTH  Strength RLE  Comment: 2-/5 HIP, 3/5 KNEE, 3+/5 ANKLE  ROM  AROM RLE (degrees)  RLE General AROM: DECREASED AT HIP  AROM LLE (degrees)  LLE AROM : WFL  BED MOBILITY  Bed Mobility  Rolling: Minimal assistance(TO THE RIGHT )  Supine to Sit: Moderate assistance  Sit to Supine: Minimal assistance(Reverse log roll)  Scooting: Contact guard assistance  Comment: log roll  TRANSFERS  Transfers  Sit to Stand: Contact guard assistance, Minimal Assistance(pull to  parallel bars )  Stand to sit: Contact guard assistance  Bed to Chair: Minimal assistance, Contact guard assistance  Stand Pivot Transfers: Contact guard assistance(RW)  Comment: BUE on w/c going sit to stand. Cuing to use UE going stand to sit.   WHEELCHAIR PROPULSION  Propulsion 1  Propulsion: Manual  Level: Level Tile  Method: RUBINA FITZPATRICK  Level of Assistance: Stand by assistance, Contact guard assistance  Description/ Details: 2 TURNS  Distance: 50  AMBULATION  Ambulation 1  Surface: level tile  Device: Parallel Bars  Other Apparatus: Wheelchair follow  Assistance: Contact guard assistance, Minimal assistance  Quality of Gait: STEP TO PATTERN. DECREASED STEP LENGTH DUE TO FATIGUE   Gait Deviations: Slow Avnia, Decreased step length  Distance: 5 FT  Comments: REQUIRES VCS TO PUSH THROUGH BUE TO ASSIST IN ADVANCING LLE. FATIGUED MORE EASILY THIS AFTERNOON DUE TO FATIGUE AND PAIN IN RIGHT HIP   STAIRS  GOALS:  Short term goals  Time Frame for Short term goals: 1 WEEK  Short term goal 1: CGA FOR ON BED MOBILITY  Short term goal 2: CGA FOR TRANSFERS  Short term goal 3: CGA AMB 50 FEET WITH AD  Short term goal 4: INDEP W/C PROPEL 50 FEET  Short term goal 5: CGA CAR TRANSFER     Long term goals  Time Frame for Long term goals : 2 WEEKS  Long term goal 1: INDEP ON BED AND TRANSFERS  Long term goal 2: INDPE  FEET WITH AD  Long term goal 3: INDPE W/C PROPEL 150 FEET  Long term goal 4: INDEP CAR TRANSFER  Long term goal 5: INDEP 4 STEP      ASSESSMENT:  Assessment: PT WAS MORE FATIGUED THIS AFTERNOON DURING BOUT OF AMBULATION AS EVIDENCED BY DIFFCULTY TO ADVANCE BLE. REQUIRED VCS TO PLACE WEIGHT THROUGH BUE TO ADVANCE LEGS. WILL REQUIRE SKILLED PT TO IMPROVE AMBULATION PATTERN AND INCREASE STRENGTH         SPEECH THERAPY        OCCUPATIONAL THERAPY     CURRENT IRF-CAT SCORES  Eating: CARE Score: 4     Oral Hygiene: CARE Score: 4         Toileting: CARE Score: 3  Comment: Mod A, one hand on GB at all times      Shower/Bathe: CARE Score: 3           Upper Body Dressing: CARE Score: 3          Lower Body Dressing: CARE Score: 2           Footwear: CARE Score: 2           Toilet Transfers: CARE Score: 3  Comment:  Mod A, w/c<>toilet using GB        Picking Up Object:  CARE Score: 88           UE Functioning:  WFLs     Pain Assessment:  Pain Level: 7  Pain Location: Hip     STGs:  Short term goals  Time Frame for Short term goals: 1 week  Short term goal 1: pt wll complete UB dressing with min A  Short term goal 2: pt will complete LB dressing with  min A  Short term goal 3: pt will weakness and deconditioning. Continue Ancef for another 8 to 12 weeks most likely. PT/OT for generalized weakness and deconditioning. Continue wound VAC for right gluteal tear/repair  2. GIcontinue with medications for constipation  3. DVT prophylaxissubcu heparin  4. Rheumatoid arthritislow-dose prednisone  5. Coronary artery diseasecontinue Imdur  6. Hypomagnesemia/hypokalemiareplacement ordered  Appreciate ID assistance  Added 300 mg gabapentin at bedtime to help her get through the night  Prednisone increased for a few days and then will reduce to 5 mg a day.   Lidoderm patch for costochondritis  ELOS:4/12, likely snf

## 2021-04-02 NOTE — PROGRESS NOTES
Sandeep Andersen  [id number]     04/02/21 1441 04/02/21 1442 04/02/21 1444   Restrictions/Precautions   Restrictions/Precautions Fall Risk;Weight Bearing  --   --    Required Braces or Orthoses? Yes  --   --    Lower Extremity Weight Bearing Restrictions   Right Lower Extremity Weight Bearing Weight Bearing As Tolerated  --   --    Left Lower Extremity Weight Bearing Weight Bearing As Tolerated  --   --    Required Braces or Orthoses   Cervical c-collar  --   --    Spinal Lumbar Corset  --   --    Spinal Other LSO  --   --    Position Activity Restriction   Spinal Precautions No Bending; No Lifting; No Twisting  --   --    Subjective   Subjective  --  Pt agreed to therapy this afternoon. --    Pain Screening   Patient Currently in Pain  --  Yes  --    Pain Assessment   Pain Assessment  --  0-10  --    Pain Level  --   --   --    Pain Type  --  Acute pain  --    Pain Location  --  Hip; Shoulder;Sternum  --    Pain Orientation  --  Right  --    Pain Descriptors  --  Discomfort  --    Pain Frequency  --  Continuous  --    Pain Onset  --  On-going  --    Clinical Progression  --  Not changed  --    Functional Pain Assessment  --  Prevents or interferes some active activities and ADLs  --    Non-Pharmaceutical Pain Intervention(s)  --  Rest;Repositioned  --    Response to Pain Intervention  --  Patient Satisfied  --    Bed Mobility   Rolling  --   --  Stand by assistance   Supine to Sit  --   --  Stand by assistance   Sit to Supine  --   --  Stand by assistance   Transfers   Sit to Stand  --   --  Contact guard assistance   Stand to sit  --   --  Contact guard assistance   Ambulation   Ambulation?  --   --   --    WB Status  --   --   --    Ambulation 1   Surface  --   --   --    Device  --   --   --    Assistance  --   --   --    Quality of Gait  --   --   --    Gait Deviations  --   --   --    Distance  --   --   --    Comments  --   --   --    Exercises   Comments  --   --   --    Conditions Requiring Skilled Therapeutic Intervention   Body structures, Functions, Activity limitations  --   --   --    Assessment  --   --   --    Activity Tolerance   Activity Tolerance  --   --   --    Safety Devices   Type of devices  --   --   --       04/02/21 1454 04/02/21 1455 04/02/21 1456   Restrictions/Precautions   Restrictions/Precautions  --   --   --    Required Braces or Orthoses?  --   --   --    Lower Extremity Weight Bearing Restrictions   Right Lower Extremity Weight Bearing  --   --   --    Left Lower Extremity Weight Bearing  --   --   --    Required Braces or Orthoses   Cervical  --   --   --    Spinal  --   --   --    Spinal Other  --   --   --    Position Activity Restriction   Spinal Precautions  --   --   --    Subjective   Subjective  --   --   --    Pain Screening   Patient Currently in Pain  --   --   --    Pain Assessment   Pain Assessment  --   --   --    Pain Level 5  --   --    Pain Type  --   --   --    Pain Location  --   --   --    Pain Orientation  --   --   --    Pain Descriptors  --   --   --    Pain Frequency  --   --   --    Pain Onset  --   --   --    Clinical Progression  --   --   --    Functional Pain Assessment  --   --   --    Non-Pharmaceutical Pain Intervention(s)  --   --   --    Response to Pain Intervention  --   --   --    Bed Mobility   Rolling  --   --   --    Supine to Sit  --   --   --    Sit to Supine  --   --   --    Transfers   Sit to Stand  --   --   --    Stand to sit  --   --   --    Ambulation   Ambulation?  --  Yes  --    WB Status  --  WBAT BLES  --    Ambulation 1   Surface  --  level tile  --    Device  --  Rolling Walker  --    Assistance  --  Contact guard assistance  --    Quality of Gait  --  Step to pattern. Flexed posture. --    Gait Deviations  --  Slow Vania;Decreased step length  --    Distance  --  30'  --    Comments  --  VCs for posture.   --    Exercises   Comments  --   --  Sitting Aubrey LE ther ex 10-15 reps   Conditions Requiring Skilled Therapeutic Intervention   Body structures, Functions, Activity limitations  --   --   --    Assessment  --   --   --    Activity Tolerance   Activity Tolerance  --   --   --    Safety Devices   Type of devices  --   --   --       04/02/21 1457 04/02/21 1531   Restrictions/Precautions   Restrictions/Precautions  --   --    Required Braces or Orthoses?  --   --    Lower Extremity Weight Bearing Restrictions   Right Lower Extremity Weight Bearing  --   --    Left Lower Extremity Weight Bearing  --   --    Required Braces or Orthoses   Cervical  --   --    Spinal  --   --    Spinal Other  --   --    Position Activity Restriction   Spinal Precautions  --   --    Subjective   Subjective  --   --    Pain Screening   Patient Currently in Pain  --   --    Pain Assessment   Pain Assessment  --   --    Pain Level  --   --    Pain Type  --   --    Pain Location  --   --    Pain Orientation  --   --    Pain Descriptors  --   --    Pain Frequency  --   --    Pain Onset  --   --    Clinical Progression  --   --    Functional Pain Assessment  --   --    Non-Pharmaceutical Pain Intervention(s)  --   --    Response to Pain Intervention  --   --    Bed Mobility   Rolling  --   --    Supine to Sit  --   --    Sit to Supine  --   --    Transfers   Sit to Stand  --   --    Stand to sit  --   --    Ambulation   Ambulation?  --   --    WB Status  --   --    Ambulation 1   Surface  --   --    Device  --   --    Assistance  --   --    Quality of Gait  --   --    Gait Deviations  --   --    Distance  --   --    Comments  --   --    Exercises   Comments  --   --    Conditions Requiring Skilled Therapeutic Intervention   Body structures, Functions, Activity limitations Decreased functional mobility ; Decreased ROM; Decreased strength;Decreased endurance;Decreased balance;Decreased fine motor control;Decreased coordination; Increased pain  --    Assessment Pt tolerated amb well this afternoon. Good and steady pace with no LOB.  Tolerated increased distance well with minimal

## 2021-04-02 NOTE — PROGRESS NOTES
Infectious Diseases Progress Note    Patient:  Kandy Mazariegos  YOB: 1955  MRN: [id number]   Admit date: 3/26/2021   Admitting Physician: Kalen Belle MD  Primary Care Physician: Howard Samayoa MD    Chief Complaint/Interval History:   She is up to chair. Alert and interactive. She is wondering about timing for going home. Explained I was hopeful that she can get another few weeks of acute rehab. Seems to be gaining strength and improving with acute rehab. When she has reached her maximum improvement from acute rehab she can work with attending, physical therapy, family, and  to help determine needs and support at home to see whether she can transition home or may need additional management at subacute rehab until further recovery. In/Out    Intake/Output Summary (Last 24 hours) at 4/2/2021 1336  Last data filed at 4/2/2021 1322  Gross per 24 hour   Intake 720 ml   Output    Net 720 ml     Allergies:    Allergies   Allergen Reactions    Amoxicillin Rash    Lipitor Rash    Niaspan [Niacin Er] Rash    Penicillins Rash    Relafen [Nabumetone] Rash    Zocor [Simvastatin] Rash     Muscle cramps     Current Meds: predniSONE (DELTASONE) tablet 30 mg, Daily    Followed by  [facility] ON 4/4/2021] predniSONE (DELTASONE) tablet 20 mg, Daily    Followed by  [facility] Alme[facility] ON 4/7/2021] predniSONE (DELTASONE) tablet 10 mg, Daily    Followed by  D[facility] Almeida ON 4/10/2021] predniSONE (DELTASONE) tablet 5 mg, Daily  magnesium oxide (MAG-OX) tablet 400 mg, Daily  potassium chloride (KLOR-CON M) extended release tablet 20 mEq, Daily with breakfast  gabapentin (NEURONTIN) capsule 300 mg, Nightly  miconazole (MICOTIN) 2 % powder, PRN  traMADol (ULTRAM) tablet 50 mg, TID  sodium chloride flush 0.9 % injection 10 mL, PRN  sodium chloride flush 0.9 % injection 10 mL, BID  oxyCODONE (ROXICODONE) immediate release tablet 15 mg, 4x Daily  furosemide (LASIX) tablet 40 mg, Daily  levothyroxine (SYNTHROID) tablet 75 mcg, Daily  cloNIDine (CATAPRES) tablet 0.1 mg, BID PRN  acyclovir (ZOVIRAX) capsule 400 mg, BID  pantoprazole (PROTONIX) tablet 40 mg, Daily  docusate sodium (COLACE) capsule 100 mg, BID  therapeutic multivitamin-minerals 1 tablet, Daily  nitroGLYCERIN (NITROSTAT) SL tablet 0.4 mg, Q5 Min PRN  isosorbide mononitrate (IMDUR) extended release tablet 60 mg, BID  carvedilol (COREG) tablet 25 mg, BID WC  guaiFENesin (MUCINEX) extended release tablet 1,200 mg, BID  bisacodyl (DULCOLAX) suppository 10 mg, Daily PRN  budesonide (PULMICORT) nebulizer suspension 500 mcg, BID PRN  cyclobenzaprine (FLEXERIL) tablet 10 mg, TID PRN  gabapentin (NEURONTIN) capsule 100 mg, Q12H  heparin (porcine) injection 5,000 Units, Q12H  lidocaine 4 % external patch 1 patch, Daily  magnesium hydroxide (MILK OF MAGNESIA) 400 MG/5ML suspension 30 mL, Daily PRN  naloxegol (MOVANTIK) tablet 25 mg, QAM  ondansetron (ZOFRAN) tablet 4 mg, Q6H PRN  polyethylene glycol (GLYCOLAX) packet 17 g, Daily  sennosides-docusate sodium (SENOKOT-S) 8.6-50 MG tablet 2 tablet, BID  acetaminophen (TYLENOL) tablet 650 mg, Q4H PRN  bisacodyl (DULCOLAX) suppository 10 mg, Daily PRN  ceFAZolin (ANCEF) 2,000 mg in sterile water 20 mL IV syringe, Q8H      Review of Systems see HPI    VitalSigns:  /83   Pulse 60   Temp 98.4 °F (36.9 °C) (Temporal)   Resp 16   Ht 5' 6\" (1.676 m)   Wt 197 lb 0.9 oz (89.4 kg)   SpO2 92%   BMI 31.81 kg/m²      Physical Exam  Line/IV site: No erythema, warmth, induration, or tenderness. Lungs clear without crackles  Lower extremities trace edema  Neuro exam stable    Lab Results:  CBC:   Recent Labs     04/01/21  0405   WBC 5.3   HGB 8.6*        BMP:  Recent Labs     04/01/21  0405      K 4.0   CL 98   CO2 32*   BUN 12   CREATININE 0.5   GLUCOSE 91     Radiology: None    Additional Studies Reviewed:  None    Impression:  1. MSSA bacteremia  2. Right psoas abscess  3. Discitis/osteomyelitis L1/L2  4.   Previous cervical

## 2021-04-02 NOTE — PROGRESS NOTES
Nutrition Assessment     Type and Reason for Visit: Reassess    Nutrition Recommendations/Plan: Modify ONS orders. Nutrition Assessment:  Pt improving nutritionally AEB pt reported improved appetite. Intakes remain variable, observed >75% of lunch consumed. Pt reports drinking most of Ensures and 25% of Ramiro supplements. Will trial Ramiro in 7-up for better pt tolerance. Malnutrition Assessment:  Malnutrition Status:  At risk for malnutrition (Comment)    Nutrition Related Findings: +1 RLE, trace LLE edema      Current Nutrition Therapies:    Dietary Nutrition Supplements: Standard High Calorie Oral Supplement, Wound Healing Oral Supplement  DIET GENERAL; Dental Soft    Anthropometric Measures:  · Height: 5' 6\" (167.6 cm)  · Current Body Wt: 197 lb (89.4 kg)   · BMI: 31.8    Nutrition Interventions:   Food and/or Nutrient Delivery:  Continue Current Diet, Modify Oral Nutrition Supplement  Coordination of Nutrition Care:  Continue to monitor while inpatient    Goals:  PO intake >50%, wt stable       Nutrition Monitoring and Evaluation:    Food/Nutrient Intake Outcomes:  Food and Nutrient Intake, Supplement Intake  Physical Signs/Symptoms Outcomes:  Biochemical Data, Nutrition Focused Physical Findings, Skin, Weight, Fluid Status or Edema     Discharge Planning:    Continue Oral Nutrition Supplement     Electronically signed by Raulito Bergman, MS, RD, LD on 4/2/21 at 2:27 PM CDT    Contact: [phone]

## 2021-04-02 NOTE — PROGRESS NOTES
Durga Tinajero  [id number]     04/02/21 1033 04/02/21 1034 04/02/21 1036   Restrictions/Precautions   Restrictions/Precautions Fall Risk;Weight Bearing  --   --    Required Braces or Orthoses? Yes  --   --    Lower Extremity Weight Bearing Restrictions   Right Lower Extremity Weight Bearing Weight Bearing As Tolerated  --   --    Left Lower Extremity Weight Bearing Weight Bearing As Tolerated  --   --    Required Braces or Orthoses   Cervical c-collar  --   --    Spinal Lumbar Corset  --   --    Spinal Other LSO  --   --    Position Activity Restriction   Spinal Precautions No Bending; No Lifting; No Twisting  --   --    Subjective   Subjective  --  Pt agreed to therapy this morning.  --    Pain Screening   Patient Currently in Pain  --  Yes  --    Pain Assessment   Pain Assessment  --  0-10  --    Pain Level  --  5  --    Patient's Stated Pain Goal  --  No pain  --    Pain Type  --  Acute pain  --    Pain Location  --  Hip; Shoulder;Sternum  --    Pain Orientation  --  Right  --    Pain Descriptors  --  Discomfort  --    Pain Frequency  --  Continuous  --    Pain Onset  --  On-going  --    Clinical Progression  --  Not changed  --    Non-Pharmaceutical Pain Intervention(s)  --  Rest;Repositioned  --    Response to Pain Intervention  --  Patient Satisfied  --    Bed Mobility   Rolling  --   --  Stand by assistance   Supine to Sit  --   --  Stand by assistance   Sit to Supine  --   --  Stand by assistance   Transfers   Sit to Stand  --   --  Contact guard assistance   Stand to sit  --   --  Contact guard assistance   Ambulation   Ambulation?  --   --   --    Ambulation 1   Surface  --   --   --    Device  --   --   --    Other Apparatus  --   --   --    Assistance  --   --   --    Quality of Gait  --   --   --    Gait Deviations  --   --   --    Distance  --   --   --    Comments  --   --   --    Stairs/Curb   Stairs?  --   --   --    Wheelchair Activities   Propulsion  --   --   --    Exercises   Comments  --   --   -- Conditions Requiring Skilled Therapeutic Intervention   Body structures, Functions, Activity limitations  --   --   --    Assessment  --   --   --    Activity Tolerance   Activity Tolerance  --   --   --    Safety Devices   Type of devices  --   --   --       04/02/21 1052 04/02/21 1054 04/02/21 1055   Restrictions/Precautions   Restrictions/Precautions  --   --   --    Required Braces or Orthoses?  --   --   --    Lower Extremity Weight Bearing Restrictions   Right Lower Extremity Weight Bearing  --   --   --    Left Lower Extremity Weight Bearing  --   --   --    Required Braces or Orthoses   Cervical  --   --   --    Spinal  --   --   --    Spinal Other  --   --   --    Position Activity Restriction   Spinal Precautions  --   --   --    Subjective   Subjective  --   --   --    Pain Screening   Patient Currently in Pain  --   --   --    Pain Assessment   Pain Assessment  --   --   --    Pain Level  --   --   --    Patient's Stated Pain Goal  --   --   --    Pain Type  --   --   --    Pain Location  --   --   --    Pain Orientation  --   --   --    Pain Descriptors  --   --   --    Pain Frequency  --   --   --    Pain Onset  --   --   --    Clinical Progression  --   --   --    Non-Pharmaceutical Pain Intervention(s)  --   --   --    Response to Pain Intervention  --   --   --    Bed Mobility   Rolling  --   --   --    Supine to Sit  --   --   --    Sit to Supine  --   --   --    Transfers   Sit to Stand  --   --   --    Stand to sit  --   --   --    Ambulation   Ambulation? Yes  --   --    Ambulation 1   Surface level tile  --   --    Device Rolling Walker  --   --    Other Apparatus Wheelchair follow  --   --    Assistance Contact guard assistance  --   --    Quality of Gait Step to pattern. Flexed posture. --   --    Gait Deviations Slow Vania;Decreased step length  --   --    Distance 15'  --   --    Comments VCs for posture, Amb in a continous [place]. --   --    Stairs/Curb   Stairs?  No  --   -- Wheelchair Activities   Propulsion No  --   --    Exercises   Comments  --  Assisted to BR at started of session. Assisted with dressing and ADLs.  --    Conditions Requiring Skilled Therapeutic Intervention   Body structures, Functions, Activity limitations  --   --  Decreased functional mobility ; Decreased ROM; Decreased strength;Decreased endurance;Decreased balance;Decreased fine motor control;Decreased coordination; Increased pain   Assessment  --   --  Assisted pt to BR at start of session. Assisted with ADLs and dressing. TF well only requiring CGA for sit to stand. Amb well at a good and steady pace. VCs for correct posture. Tolerated increased distance well with minimal fatigue noted. Activity Tolerance   Activity Tolerance  --   --  Patient Tolerated treatment well   Safety Devices   Type of devices  --   --   --       04/02/21 1116   Restrictions/Precautions   Restrictions/Precautions  --    Required Braces or Orthoses? --    Lower Extremity Weight Bearing Restrictions   Right Lower Extremity Weight Bearing  --    Left Lower Extremity Weight Bearing  --    Required Braces or Orthoses   Cervical  --    Spinal  --    Spinal Other  --    Position Activity Restriction   Spinal Precautions  --    Subjective   Subjective  --    Pain Screening   Patient Currently in Pain  --    Pain Assessment   Pain Assessment  --    Pain Level  --    Patient's Stated Pain Goal  --    Pain Type  --    Pain Location  --    Pain Orientation  --    Pain Descriptors  --    Pain Frequency  --    Pain Onset  --    Clinical Progression  --    Non-Pharmaceutical Pain Intervention(s)  --    Response to Pain Intervention  --    Bed Mobility   Rolling  --    Supine to Sit  --    Sit to Supine  --    Transfers   Sit to Stand  --    Stand to sit  --    Ambulation   Ambulation?   --    Ambulation 1   Surface  --    Device  --    Other Apparatus  --    Assistance  --    Quality of Gait  --    Gait Deviations  --    Distance  --    Comments --    Stairs/Curb   Stairs?  --    Wheelchair Activities   Propulsion  --    Exercises   Comments  --    Conditions Requiring Skilled Therapeutic Intervention   Body structures, Functions, Activity limitations  --    Assessment  --    Activity Tolerance   Activity Tolerance  --    Safety Devices   Type of devices Left in chair  (with OT)   Electronically signed by Brandie Jin PTA on 4/2/2021 at 11:18 AM

## 2021-04-02 NOTE — PROGRESS NOTES
Occupational Therapy  Facility/Department: [facility] 8 REHAB UNIT  Daily Treatment Note  NAME: iKrstin Brunner  : 1955  MRN: [id number]    Date of Service: 2021    Discharge Recommendations:  Continue to assess pending progress       Assessment   Performance deficits / Impairments: Decreased high-level IADLs;Decreased functional mobility ; Decreased endurance;Decreased ADL status; Decreased balance;Decreased strength;Decreased coordination;Decreased sensation  Treatment Diagnosis: R Gluteal abscess, recent cervical decompression, MSSA on long-term antibiotics due to recent discitis,, L1-2 discectomy  Activity Tolerance  Activity Tolerance: Patient Tolerated treatment well  Safety Devices  Safety Devices in place: Yes(Left with PT)  Type of devices: Left in chair         Patient Diagnosis(es): There were no encounter diagnoses. has a past medical history of CAD (coronary artery disease), Cellulitis, History of blood transfusion, History of DVT (deep vein thrombosis), History of neutropenia, History of pulmonary fibrosis, Hx of blood clots, Hypercholesterolemia, Hypertension, Intrinsic asthma, Pacemaker, Post op infection, Rheumatoid arteritis ([address] Sinus node dysfunction ([address] Staph aureus infection, Status post placement of implantable loop recorder, Syncope, and Thyroid disease. has a past surgical history that includes Diagnostic Cardiac Cath Lab Procedure; LEEP; Coronary angioplasty with stent (); Pacemaker insertion (2016); back surgery (2018); pr total knee arthroplasty (Right, 2018); pr incis/drain thigh/knee abscess,deep (Right, 10/23/2018); Cardiac catheterization (14  MDL); Cardiac catheterization (1/26/15  MDL); pacemaker placement; joint replacement; and Revision total knee arthroplasty (Right, 2018).     Restrictions  Restrictions/Precautions  Restrictions/Precautions: Fall Risk, Weight Bearing  Required Braces or Orthoses?: Yes  Lower Extremity Weight Bearing Restrictions  Right Lower Extremity Weight Bearing: Weight Bearing As Tolerated  Left Lower Extremity Weight Bearing: Weight Bearing As Tolerated  Required Braces or Orthoses  Cervical: c-collar  Spinal: Lumbar Corset  Spinal Other: LSO  Position Activity Restriction  Spinal Precautions: No Bending, No Lifting, No Twisting       Objective             Balance  Sitting Balance: Supervision  Standing Balance: Contact guard assistance  Functional Mobility  Functional - Mobility Device: Rolling Walker  Activity: Other  Assist Level: Contact guard assistance  Functional Mobility Comments: Short distance     Transfers  Stand Step Transfers: Contact guard assistance  Sit to stand: Contact guard assistance  Stand to sit: Contact guard assistance  Transfer Comments: to/from RW,w/c<>stretcher        Type of ROM/Therapeutic Exercise  Type of ROM/Therapeutic Exercise: Free weights;AROM  Comment: AROM proximally; 2# distally: 2 sets of 10 in all planes     Plan   Plan  Current Treatment Recommendations: Self-Care / ADL, Safety Education & Training, Pain Management, Endurance Training, Functional Mobility Training, Strengthening, Patient/Caregiver Education & Training, Home Management Training, Equipment Evaluation, Education, & procurement, Balance Training, Positioning       OutComes Score       04/02/21 [address] or touching assistance   Comment CGA   CARE Score 4      04/02/21 1345   Toilet Transfer   Assistance Needed Supervision or touching assistance   Comment CGA, w/c<>toilet using GB   CARE Score 4          Goals  Short term goals  Time Frame for Short term goals: 1 week  Short term goal 1: MET  Short term goal 2: pt will complete LB dressing with  min A  Short term goal 3: pt will complete overall bathing with min A  Short term goal 4: pt will complete simple ambulatory home making task with min A  Short term goal 5: pt will don/doff back brace with independence  Short term goal 6: pt will complete 1-2 handed standing task for 3 mins with CGA  Short term goal 7: pt will complete R FM/sensory acts to improve function of hand during ADL task  Short term goal 8: Pt will increase B  strength by 2#. Short term goal 9: Pt will decrease B 9 hole peg test time by 2 seconds.   Long term goals  Time Frame for Long term goals : 2 weeks  Long term goal 1: complete overall dressing with supervision  Long term goal 2: complete overall bathing with supervision  Long term goal 3: complete overall toileting with supervision  Long term goal 4: complete simple ambulatory home making task with supervision  Long term goal 5: complete HEP with independence       Therapy Time   Individual Concurrent Group Co-treatment   Time In 1345         Time Out 1430         Minutes 45         Timed Code Treatment Minutes: [address], OT

## 2021-04-03 PROCEDURE — 1180000000 HC REHAB R&B

## 2021-04-03 PROCEDURE — 2580000003 HC RX 258: Performed by: PSYCHIATRY & NEUROLOGY

## 2021-04-03 PROCEDURE — 6360000002 HC RX W HCPCS: Performed by: PSYCHIATRY & NEUROLOGY

## 2021-04-03 PROCEDURE — 2500000003 HC RX 250 WO HCPCS: Performed by: PSYCHIATRY & NEUROLOGY

## 2021-04-03 PROCEDURE — 6370000000 HC RX 637 (ALT 250 FOR IP): Performed by: PSYCHIATRY & NEUROLOGY

## 2021-04-03 RX ADMIN — METOCLOPRAMIDE 5 MG: 5 TABLET ORAL at 16:08

## 2021-04-03 RX ADMIN — CARVEDILOL 25 MG: 25 TABLET, FILM COATED ORAL at 09:23

## 2021-04-03 RX ADMIN — NALOXEGOL OXALATE 25 MG: 12.5 TABLET, FILM COATED ORAL at 09:24

## 2021-04-03 RX ADMIN — SODIUM CHLORIDE, PRESERVATIVE FREE 10 ML: 5 INJECTION INTRAVENOUS at 09:34

## 2021-04-03 RX ADMIN — MICONAZOLE NITRATE: 2 POWDER TOPICAL at 21:13

## 2021-04-03 RX ADMIN — GUAIFENESIN 1200 MG: 600 TABLET, EXTENDED RELEASE ORAL at 21:12

## 2021-04-03 RX ADMIN — PREDNISONE 30 MG: 20 TABLET ORAL at 09:25

## 2021-04-03 RX ADMIN — HEPARIN SODIUM 5000 UNITS: 5000 INJECTION INTRAVENOUS; SUBCUTANEOUS at 09:32

## 2021-04-03 RX ADMIN — HEPARIN SODIUM 5000 UNITS: 5000 INJECTION INTRAVENOUS; SUBCUTANEOUS at 21:16

## 2021-04-03 RX ADMIN — ISOSORBIDE MONONITRATE 60 MG: 60 TABLET, EXTENDED RELEASE ORAL at 09:25

## 2021-04-03 RX ADMIN — METOCLOPRAMIDE 5 MG: 5 TABLET ORAL at 06:09

## 2021-04-03 RX ADMIN — Medication 2000 MG: at 16:08

## 2021-04-03 RX ADMIN — Medication 1 TABLET: at 09:23

## 2021-04-03 RX ADMIN — ISOSORBIDE MONONITRATE 60 MG: 60 TABLET, EXTENDED RELEASE ORAL at 21:12

## 2021-04-03 RX ADMIN — OXYCODONE 15 MG: 5 TABLET ORAL at 17:28

## 2021-04-03 RX ADMIN — GABAPENTIN 300 MG: 300 CAPSULE ORAL at 21:12

## 2021-04-03 RX ADMIN — TRAMADOL HYDROCHLORIDE 50 MG: 50 TABLET, FILM COATED ORAL at 09:25

## 2021-04-03 RX ADMIN — ACYCLOVIR 400 MG: 200 CAPSULE ORAL at 09:25

## 2021-04-03 RX ADMIN — GABAPENTIN 100 MG: 100 CAPSULE ORAL at 21:13

## 2021-04-03 RX ADMIN — FUROSEMIDE 40 MG: 40 TABLET ORAL at 09:23

## 2021-04-03 RX ADMIN — DOCUSATE SODIUM 50 MG AND SENNOSIDES 8.6 MG 2 TABLET: 8.6; 5 TABLET, FILM COATED ORAL at 09:24

## 2021-04-03 RX ADMIN — METOCLOPRAMIDE 5 MG: 5 TABLET ORAL at 11:43

## 2021-04-03 RX ADMIN — POTASSIUM CHLORIDE 20 MEQ: 1500 TABLET, EXTENDED RELEASE ORAL at 09:23

## 2021-04-03 RX ADMIN — ACYCLOVIR 400 MG: 200 CAPSULE ORAL at 21:12

## 2021-04-03 RX ADMIN — PANTOPRAZOLE SODIUM 40 MG: 40 TABLET, DELAYED RELEASE ORAL at 09:24

## 2021-04-03 RX ADMIN — Medication 2000 MG: at 00:30

## 2021-04-03 RX ADMIN — DOCUSATE SODIUM 50 MG AND SENNOSIDES 8.6 MG 2 TABLET: 8.6; 5 TABLET, FILM COATED ORAL at 17:28

## 2021-04-03 RX ADMIN — DOCUSATE SODIUM 100 MG: 100 CAPSULE, LIQUID FILLED ORAL at 09:23

## 2021-04-03 RX ADMIN — Medication 2000 MG: at 09:34

## 2021-04-03 RX ADMIN — GUAIFENESIN 1200 MG: 600 TABLET, EXTENDED RELEASE ORAL at 09:24

## 2021-04-03 RX ADMIN — DOCUSATE SODIUM 100 MG: 100 CAPSULE, LIQUID FILLED ORAL at 21:12

## 2021-04-03 RX ADMIN — MAGNESIUM OXIDE TAB 400 MG (240 MG ELEMENTAL MG) 400 MG: 400 (240 MG) TAB at 09:24

## 2021-04-03 RX ADMIN — OXYCODONE 15 MG: 5 TABLET ORAL at 13:30

## 2021-04-03 RX ADMIN — CARVEDILOL 25 MG: 25 TABLET, FILM COATED ORAL at 17:29

## 2021-04-03 RX ADMIN — GABAPENTIN 100 MG: 100 CAPSULE ORAL at 09:23

## 2021-04-03 RX ADMIN — LEVOTHYROXINE SODIUM 75 MCG: 75 TABLET ORAL at 06:09

## 2021-04-03 RX ADMIN — OXYCODONE 15 MG: 5 TABLET ORAL at 09:24

## 2021-04-03 RX ADMIN — POLYETHYLENE GLYCOL 3350 17 G: 17 POWDER, FOR SOLUTION ORAL at 09:23

## 2021-04-03 RX ADMIN — TRAMADOL HYDROCHLORIDE 50 MG: 50 TABLET, FILM COATED ORAL at 13:30

## 2021-04-03 RX ADMIN — OXYCODONE 15 MG: 5 TABLET ORAL at 21:12

## 2021-04-03 RX ADMIN — TRAMADOL HYDROCHLORIDE 50 MG: 50 TABLET, FILM COATED ORAL at 21:12

## 2021-04-03 ASSESSMENT — PAIN DESCRIPTION - PROGRESSION: CLINICAL_PROGRESSION: NOT CHANGED

## 2021-04-03 ASSESSMENT — PAIN DESCRIPTION - ONSET: ONSET: ON-GOING

## 2021-04-03 ASSESSMENT — PAIN DESCRIPTION - PAIN TYPE: TYPE: ACUTE PAIN

## 2021-04-03 ASSESSMENT — PAIN SCALES - GENERAL
PAINLEVEL_OUTOF10: 5
PAINLEVEL_OUTOF10: 5
PAINLEVEL_OUTOF10: 0

## 2021-04-03 ASSESSMENT — PAIN DESCRIPTION - LOCATION: LOCATION: HIP

## 2021-04-03 NOTE — PLAN OF CARE
Problem: Falls - Risk of:  Goal: Will remain free from falls  Description: Will remain free from falls  4/3/2021 1100 by Erasmo Martin RN  Outcome: Ongoing  4/3/2021 0343 by Callum Hernandez LPN  Outcome: Ongoing     Problem: Pain:  Description: Pain management should include both nonpharmacologic and pharmacologic interventions.   Goal: Pain level will decrease  Description: Pain level will decrease  4/3/2021 1100 by Erasmo Martin RN  Outcome: Ongoing  4/3/2021 0343 by Callum Hernandez LPN  Outcome: Ongoing

## 2021-04-03 NOTE — PLAN OF CARE
Problem: Falls - Risk of:  Goal: Will remain free from falls  Description: Will remain free from falls  Outcome: Ongoing  Goal: Absence of physical injury  Description: Absence of physical injury  Outcome: Ongoing     Problem: Pain:  Goal: Pain level will decrease  Description: Pain level will decrease  Outcome: Ongoing  Goal: Control of acute pain  Description: Control of acute pain  Outcome: Ongoing  Goal: Control of chronic pain  Description: Control of chronic pain  Outcome: Ongoing     Problem: SAFETY  Goal: Free from accidental physical injury  Outcome: Ongoing  Goal: Free from intentional harm  Outcome: Ongoing  Goal: LTG - Patient will demonstrate safety requirements appropriate to situation/environment  Outcome: Ongoing  Goal: LTG - patient will utilize safety techniques  Outcome: Ongoing  Goal: STG - patient locks brakes on wheelchair  Outcome: Ongoing  Goal: STG - Patient uses call light consistently to request assistance with transfers  Outcome: Ongoing  Goal: STG - patient uses gait belt during all transfers  Outcome: Ongoing     Problem: DAILY CARE  Goal: Daily care needs are met  Outcome: Ongoing     Problem: PAIN  Goal: Patient's pain/discomfort is manageable  Outcome: Ongoing  Goal: STG - pain is manageable through therapies  Outcome: Ongoing  Goal: STG - Patient will verbalize an acceptable level of pain  Outcome: Ongoing  Goal: STG - patients pain is managed to allow active participation in daily activities  Outcome: Ongoing     Problem: SKIN INTEGRITY  Goal: Skin integrity is maintained or improved  Outcome: Ongoing  Goal: LTG - Patient will be free from infection  Outcome: Ongoing  Goal: STG - Patient demonstrates skin care/treatment/dressing change  Outcome: Ongoing  Goal: STG - patient will maintain good skin integrity  Outcome: Ongoing     Problem: KNOWLEDGE DEFICIT  Goal: Patient/S.O. demonstrates understanding of disease process, treatment plan, medications, and discharge instructions. Outcome: Ongoing     Problem: DISCHARGE BARRIERS  Goal: Patient's continuum of care needs are met  Outcome: Ongoing     Problem: IP BLADDER/VOIDING  Goal: LTG - Patient will utilize adaptive techniques/equipment to complete bladder elimination  Outcome: Ongoing  Goal: STG - Patient demonstrates no accidents  Outcome: Ongoing  Goal: STG - patient participates in bladder program by expressing need to void  Outcome: Ongoing     Problem: IP BOWEL ELIMINATION  Goal: LTG - patient will have regular and routine bowel evacuation  Outcome: Ongoing     Problem: IP BREATHING  Goal: STG - respiratory rate and effort will be within normal limits for the patient  Outcome: Ongoing     Problem: NUTRITION  Goal: Patient/Family demonstrates understanding of diet  Outcome: Ongoing     Problem: Mobility - Impaired:  Goal: Mobility will improve  Description: Mobility will improve  Outcome: Ongoing     Problem:  Activity:  Goal: Ability to tolerate increased activity will improve  Description: Ability to tolerate increased activity will improve  Outcome: Ongoing     Problem: Nutrition  Goal: Optimal nutrition therapy  4/3/2021 0343 by Geoff Au LPN  Outcome: Ongoing  4/2/2021 1428 by Zack Marshall, MS, RD, LD  Outcome: Ongoing     Problem: Bleeding:  Goal: Will show no signs and symptoms of excessive bleeding  Description: Will show no signs and symptoms of excessive bleeding  Outcome: Ongoing     Problem: Skin Integrity:  Goal: Will show no infection signs and symptoms  Description: Will show no infection signs and symptoms  Outcome: Ongoing  Goal: Absence of new skin breakdown  Description: Absence of new skin breakdown  Outcome: Ongoing     Problem: Infection - Surgical Site:  Goal: Will show no infection signs and symptoms  Description: Will show no infection signs and symptoms  Outcome: Ongoing

## 2021-04-04 PROCEDURE — 6370000000 HC RX 637 (ALT 250 FOR IP): Performed by: PSYCHIATRY & NEUROLOGY

## 2021-04-04 PROCEDURE — 1180000000 HC REHAB R&B

## 2021-04-04 PROCEDURE — 2580000003 HC RX 258: Performed by: PSYCHIATRY & NEUROLOGY

## 2021-04-04 PROCEDURE — 6360000002 HC RX W HCPCS: Performed by: PSYCHIATRY & NEUROLOGY

## 2021-04-04 RX ADMIN — PREDNISONE 20 MG: 20 TABLET ORAL at 07:42

## 2021-04-04 RX ADMIN — GUAIFENESIN 1200 MG: 600 TABLET, EXTENDED RELEASE ORAL at 23:35

## 2021-04-04 RX ADMIN — ACYCLOVIR 400 MG: 200 CAPSULE ORAL at 23:37

## 2021-04-04 RX ADMIN — TRAMADOL HYDROCHLORIDE 50 MG: 50 TABLET, FILM COATED ORAL at 07:43

## 2021-04-04 RX ADMIN — ISOSORBIDE MONONITRATE 60 MG: 60 TABLET, EXTENDED RELEASE ORAL at 23:38

## 2021-04-04 RX ADMIN — SODIUM CHLORIDE, PRESERVATIVE FREE 10 ML: 5 INJECTION INTRAVENOUS at 23:40

## 2021-04-04 RX ADMIN — HEPARIN SODIUM 5000 UNITS: 5000 INJECTION INTRAVENOUS; SUBCUTANEOUS at 23:40

## 2021-04-04 RX ADMIN — TRAMADOL HYDROCHLORIDE 50 MG: 50 TABLET, FILM COATED ORAL at 12:54

## 2021-04-04 RX ADMIN — CARVEDILOL 25 MG: 25 TABLET, FILM COATED ORAL at 16:59

## 2021-04-04 RX ADMIN — OXYCODONE 15 MG: 5 TABLET ORAL at 07:41

## 2021-04-04 RX ADMIN — Medication 2000 MG: at 00:00

## 2021-04-04 RX ADMIN — OXYCODONE 15 MG: 5 TABLET ORAL at 23:38

## 2021-04-04 RX ADMIN — DOCUSATE SODIUM 100 MG: 100 CAPSULE, LIQUID FILLED ORAL at 23:37

## 2021-04-04 RX ADMIN — ACYCLOVIR 400 MG: 200 CAPSULE ORAL at 07:40

## 2021-04-04 RX ADMIN — MAGNESIUM OXIDE TAB 400 MG (240 MG ELEMENTAL MG) 400 MG: 400 (240 MG) TAB at 07:42

## 2021-04-04 RX ADMIN — METOCLOPRAMIDE 5 MG: 5 TABLET ORAL at 05:56

## 2021-04-04 RX ADMIN — FUROSEMIDE 40 MG: 40 TABLET ORAL at 07:43

## 2021-04-04 RX ADMIN — GABAPENTIN 100 MG: 100 CAPSULE ORAL at 07:41

## 2021-04-04 RX ADMIN — GABAPENTIN 300 MG: 300 CAPSULE ORAL at 23:37

## 2021-04-04 RX ADMIN — GABAPENTIN 100 MG: 100 CAPSULE ORAL at 23:39

## 2021-04-04 RX ADMIN — OXYCODONE 15 MG: 5 TABLET ORAL at 16:58

## 2021-04-04 RX ADMIN — PANTOPRAZOLE SODIUM 40 MG: 40 TABLET, DELAYED RELEASE ORAL at 07:41

## 2021-04-04 RX ADMIN — ISOSORBIDE MONONITRATE 60 MG: 60 TABLET, EXTENDED RELEASE ORAL at 07:42

## 2021-04-04 RX ADMIN — CARVEDILOL 25 MG: 25 TABLET, FILM COATED ORAL at 07:41

## 2021-04-04 RX ADMIN — METOCLOPRAMIDE 5 MG: 5 TABLET ORAL at 15:51

## 2021-04-04 RX ADMIN — SODIUM CHLORIDE, PRESERVATIVE FREE 10 ML: 5 INJECTION INTRAVENOUS at 00:00

## 2021-04-04 RX ADMIN — Medication 2000 MG: at 07:40

## 2021-04-04 RX ADMIN — NALOXEGOL OXALATE 25 MG: 12.5 TABLET, FILM COATED ORAL at 07:43

## 2021-04-04 RX ADMIN — POTASSIUM CHLORIDE 20 MEQ: 1500 TABLET, EXTENDED RELEASE ORAL at 07:41

## 2021-04-04 RX ADMIN — SODIUM CHLORIDE, PRESERVATIVE FREE 10 ML: 5 INJECTION INTRAVENOUS at 07:38

## 2021-04-04 RX ADMIN — Medication 2000 MG: at 15:51

## 2021-04-04 RX ADMIN — Medication 1 TABLET: at 07:43

## 2021-04-04 RX ADMIN — OXYCODONE 15 MG: 5 TABLET ORAL at 12:55

## 2021-04-04 RX ADMIN — GUAIFENESIN 1200 MG: 600 TABLET, EXTENDED RELEASE ORAL at 07:42

## 2021-04-04 RX ADMIN — LEVOTHYROXINE SODIUM 75 MCG: 75 TABLET ORAL at 05:56

## 2021-04-04 RX ADMIN — TRAMADOL HYDROCHLORIDE 50 MG: 50 TABLET, FILM COATED ORAL at 23:39

## 2021-04-04 RX ADMIN — HEPARIN SODIUM 5000 UNITS: 5000 INJECTION INTRAVENOUS; SUBCUTANEOUS at 07:37

## 2021-04-04 RX ADMIN — METOCLOPRAMIDE 5 MG: 5 TABLET ORAL at 11:41

## 2021-04-04 ASSESSMENT — PAIN SCALES - GENERAL
PAINLEVEL_OUTOF10: 0
PAINLEVEL_OUTOF10: 3
PAINLEVEL_OUTOF10: 3
PAINLEVEL_OUTOF10: 4
PAINLEVEL_OUTOF10: 3
PAINLEVEL_OUTOF10: 1

## 2021-04-04 ASSESSMENT — PAIN DESCRIPTION - ONSET
ONSET: GRADUAL
ONSET: GRADUAL

## 2021-04-04 ASSESSMENT — PAIN DESCRIPTION - FREQUENCY: FREQUENCY: INTERMITTENT

## 2021-04-04 ASSESSMENT — PAIN DESCRIPTION - PROGRESSION: CLINICAL_PROGRESSION: GRADUALLY IMPROVING

## 2021-04-04 ASSESSMENT — PAIN DESCRIPTION - ORIENTATION
ORIENTATION: RIGHT
ORIENTATION: RIGHT

## 2021-04-04 ASSESSMENT — PAIN DESCRIPTION - LOCATION
LOCATION: HIP
LOCATION: HIP

## 2021-04-04 ASSESSMENT — PAIN DESCRIPTION - DESCRIPTORS
DESCRIPTORS: ACHING
DESCRIPTORS: ACHING

## 2021-04-04 ASSESSMENT — PAIN DESCRIPTION - PAIN TYPE: TYPE: ACUTE PAIN

## 2021-04-04 NOTE — PLAN OF CARE
Problem: Falls - Risk of:  Goal: Will remain free from falls  Description: Will remain free from falls  4/4/2021 0954 by Sonya Moctezuma RN  Outcome: Ongoing  4/4/2021 0009 by Eva Austin LPN  Outcome: Ongoing   Up with 1 assist with walker

## 2021-04-04 NOTE — PLAN OF CARE
Problem: Falls - Risk of:  Goal: Will remain free from falls  Description: Will remain free from falls  4/4/2021 0009 by Stephon Rodriguez LPN  Outcome: Ongoing  4/3/2021 1100 by Gely Shah RN  Outcome: Ongoing  Goal: Absence of physical injury  Description: Absence of physical injury  4/4/2021 0009 by Stephon Rodriguez LPN  Outcome: Ongoing  4/3/2021 1100 by Gely Shah RN  Outcome: Ongoing     Problem: Pain:  Goal: Pain level will decrease  Description: Pain level will decrease  4/4/2021 0009 by Stephon Rodriguez LPN  Outcome: Ongoing  4/3/2021 1100 by Gely Shah RN  Outcome: Ongoing  Goal: Control of acute pain  Description: Control of acute pain  4/4/2021 0009 by Stephon Rodriguez LPN  Outcome: Ongoing  4/3/2021 1100 by Gely Shah RN  Outcome: Ongoing  Goal: Control of chronic pain  Description: Control of chronic pain  4/4/2021 0009 by Stephon Rodriguez LPN  Outcome: Ongoing  4/3/2021 1100 by Gely Shah RN  Outcome: Ongoing     Problem: SAFETY  Goal: Free from accidental physical injury  4/4/2021 0009 by Stephon Rodriguez LPN  Outcome: Ongoing  4/3/2021 1100 by Gely Shah RN  Outcome: Ongoing  Goal: Free from intentional harm  4/4/2021 0009 by Stephon Rodriguez LPN  Outcome: Ongoing  4/3/2021 1100 by Gely Shah RN  Outcome: Ongoing  Goal: LTG - Patient will demonstrate safety requirements appropriate to situation/environment  4/4/2021 0009 by Stephon Rodriguez LPN  Outcome: Ongoing  4/3/2021 1100 by Gely Shah RN  Outcome: Ongoing  Goal: LTG - patient will utilize safety techniques  4/4/2021 0009 by Stephon Rodriguez LPN  Outcome: Ongoing  4/3/2021 1100 by Gely Shah RN  Outcome: Ongoing  Goal: STG - patient locks brakes on wheelchair  4/4/2021 0009 by Stephon Rodriguez LPN  Outcome: Ongoing  4/3/2021 1100 by Gely Shah RN  Outcome: Ongoing  Goal: STG - Patient uses call light consistently to request assistance with transfers  4/4/2021 0009 understanding of disease process, treatment plan, medications, and discharge instructions. 4/4/2021 0009 by Kirstin Recinos LPN  Outcome: Ongoing  4/3/2021 1100 by Rg Sanchez RN  Outcome: Ongoing     Problem: DISCHARGE BARRIERS  Goal: Patient's continuum of care needs are met  4/4/2021 0009 by Kirstin Recinos LPN  Outcome: Ongoing  4/3/2021 1100 by Rg Sanchez RN  Outcome: Ongoing     Problem: IP BLADDER/VOIDING  Goal: LTG - Patient will utilize adaptive techniques/equipment to complete bladder elimination  4/4/2021 0009 by Kirstin Recinos LPN  Outcome: Ongoing  4/3/2021 1100 by Rg Sanchez RN  Outcome: Ongoing  Goal: STG - Patient demonstrates no accidents  4/4/2021 0009 by Kirstin Recinos LPN  Outcome: Ongoing  4/3/2021 1100 by Rg Sanchez RN  Outcome: Ongoing  Goal: STG - patient participates in bladder program by expressing need to void  4/4/2021 0009 by Kirstin Recinos LPN  Outcome: Ongoing  4/3/2021 1100 by Rg Sanchez RN  Outcome: Ongoing     Problem: IP BOWEL ELIMINATION  Goal: LTG - patient will have regular and routine bowel evacuation  4/4/2021 0009 by Kirstin Recinos LPN  Outcome: Ongoing  4/3/2021 1100 by Rg Sanchez RN  Outcome: Ongoing     Problem: IP BREATHING  Goal: STG - respiratory rate and effort will be within normal limits for the patient  4/4/2021 0009 by Kirstin Recinos LPN  Outcome: Ongoing  4/3/2021 1100 by Rg Sanchez RN  Outcome: Ongoing     Problem: NUTRITION  Goal: Patient/Family demonstrates understanding of diet  4/4/2021 0009 by Kirstin Recinos LPN  Outcome: Ongoing  4/3/2021 1100 by Rg Sanchez RN  Outcome: Ongoing     Problem: Mobility - Impaired:  Goal: Mobility will improve  Description: Mobility will improve  4/4/2021 0009 by Kirstin Recinos LPN  Outcome: Ongoing  4/3/2021 1100 by Rg Sanchez RN  Outcome: Ongoing     Problem:  Activity:  Goal: Ability to tolerate increased activity will improve Description: Ability to tolerate increased activity will improve  4/4/2021 0009 by Ector Zavaleta LPN  Outcome: Ongoing  4/3/2021 1100 by Elie Mccartney RN  Outcome: Ongoing     Problem: Nutrition  Goal: Optimal nutrition therapy  4/4/2021 0009 by Ector Zavaleta LPN  Outcome: Ongoing  4/3/2021 1100 by Elie Mccartney RN  Outcome: Ongoing     Problem: Bleeding:  Goal: Will show no signs and symptoms of excessive bleeding  Description: Will show no signs and symptoms of excessive bleeding  4/4/2021 0009 by Ector Zavaleta LPN  Outcome: Ongoing  4/3/2021 1100 by Elie Mccartney RN  Outcome: Ongoing     Problem: Skin Integrity:  Goal: Will show no infection signs and symptoms  Description: Will show no infection signs and symptoms  4/4/2021 0009 by Ector Zavaleta LPN  Outcome: Ongoing  4/3/2021 1100 by Elie Mccartney RN  Outcome: Ongoing  Goal: Absence of new skin breakdown  Description: Absence of new skin breakdown  4/4/2021 0009 by Ector Zavaleta LPN  Outcome: Ongoing  4/3/2021 1100 by Elie Mccartney RN  Outcome: Ongoing     Problem: Infection - Surgical Site:  Goal: Will show no infection signs and symptoms  Description: Will show no infection signs and symptoms  4/4/2021 0009 by Ector Zavaleta LPN  Outcome: Ongoing  4/3/2021 1100 by Elie Mccartney RN  Outcome: Ongoing

## 2021-04-05 LAB
ANION GAP SERPL CALCULATED.3IONS-SCNC: 6 MMOL/L (ref 7–19)
BASOPHILS ABSOLUTE: 0.1 K/UL (ref 0–0.2)
BASOPHILS RELATIVE PERCENT: 0.7 % (ref 0–1)
BUN BLDV-MCNC: 20 MG/DL (ref 8–23)
CALCIUM SERPL-MCNC: 8.7 MG/DL (ref 8.8–10.2)
CHLORIDE BLD-SCNC: 97 MMOL/L (ref 98–111)
CO2: 34 MMOL/L (ref 22–29)
CREAT SERPL-MCNC: 0.4 MG/DL (ref 0.5–0.9)
EOSINOPHILS ABSOLUTE: 0.1 K/UL (ref 0–0.6)
EOSINOPHILS RELATIVE PERCENT: 1 % (ref 0–5)
GFR AFRICAN AMERICAN: >59
GFR NON-AFRICAN AMERICAN: >60
GLUCOSE BLD-MCNC: 82 MG/DL (ref 74–109)
HCT VFR BLD CALC: 31.5 % (ref 37–47)
HEMOGLOBIN: 9.2 G/DL (ref 12–16)
IMMATURE GRANULOCYTES #: 0.3 K/UL
LYMPHOCYTES ABSOLUTE: 2.1 K/UL (ref 1.1–4.5)
LYMPHOCYTES RELATIVE PERCENT: 31.2 % (ref 20–40)
MCH RBC QN AUTO: 28.4 PG (ref 27–31)
MCHC RBC AUTO-ENTMCNC: 29.2 G/DL (ref 33–37)
MCV RBC AUTO: 97.2 FL (ref 81–99)
MONOCYTES ABSOLUTE: 0.9 K/UL (ref 0–0.9)
MONOCYTES RELATIVE PERCENT: 12.9 % (ref 0–10)
NEUTROPHILS ABSOLUTE: 3.4 K/UL (ref 1.5–7.5)
NEUTROPHILS RELATIVE PERCENT: 50.2 % (ref 50–65)
PDW BLD-RTO: 17.1 % (ref 11.5–14.5)
PLATELET # BLD: 232 K/UL (ref 130–400)
PMV BLD AUTO: 10.3 FL (ref 9.4–12.3)
POTASSIUM REFLEX MAGNESIUM: 4.3 MMOL/L (ref 3.5–5)
RBC # BLD: 3.24 M/UL (ref 4.2–5.4)
SODIUM BLD-SCNC: 137 MMOL/L (ref 136–145)
WBC # BLD: 6.8 K/UL (ref 4.8–10.8)

## 2021-04-05 PROCEDURE — 6360000002 HC RX W HCPCS: Performed by: PSYCHIATRY & NEUROLOGY

## 2021-04-05 PROCEDURE — 80048 BASIC METABOLIC PNL TOTAL CA: CPT

## 2021-04-05 PROCEDURE — 97116 GAIT TRAINING THERAPY: CPT

## 2021-04-05 PROCEDURE — 97110 THERAPEUTIC EXERCISES: CPT

## 2021-04-05 PROCEDURE — 6370000000 HC RX 637 (ALT 250 FOR IP): Performed by: PSYCHIATRY & NEUROLOGY

## 2021-04-05 PROCEDURE — 97606 NEG PRS WND THER DME>50 SQCM: CPT

## 2021-04-05 PROCEDURE — 36415 COLL VENOUS BLD VENIPUNCTURE: CPT

## 2021-04-05 PROCEDURE — 2580000003 HC RX 258: Performed by: PSYCHIATRY & NEUROLOGY

## 2021-04-05 PROCEDURE — 1180000000 HC REHAB R&B

## 2021-04-05 PROCEDURE — 85025 COMPLETE CBC W/AUTO DIFF WBC: CPT

## 2021-04-05 PROCEDURE — 97530 THERAPEUTIC ACTIVITIES: CPT

## 2021-04-05 PROCEDURE — 97130 THER IVNTJ EA ADDL 15 MIN: CPT

## 2021-04-05 PROCEDURE — 92526 ORAL FUNCTION THERAPY: CPT

## 2021-04-05 PROCEDURE — 97129 THER IVNTJ 1ST 15 MIN: CPT

## 2021-04-05 PROCEDURE — 99232 SBSQ HOSP IP/OBS MODERATE 35: CPT | Performed by: PSYCHIATRY & NEUROLOGY

## 2021-04-05 RX ADMIN — ACYCLOVIR 400 MG: 200 CAPSULE ORAL at 22:05

## 2021-04-05 RX ADMIN — TRAMADOL HYDROCHLORIDE 50 MG: 50 TABLET, FILM COATED ORAL at 08:20

## 2021-04-05 RX ADMIN — HEPARIN SODIUM 5000 UNITS: 5000 INJECTION INTRAVENOUS; SUBCUTANEOUS at 22:07

## 2021-04-05 RX ADMIN — ACYCLOVIR 400 MG: 200 CAPSULE ORAL at 08:19

## 2021-04-05 RX ADMIN — OXYCODONE 15 MG: 5 TABLET ORAL at 17:13

## 2021-04-05 RX ADMIN — METOCLOPRAMIDE 5 MG: 5 TABLET ORAL at 11:08

## 2021-04-05 RX ADMIN — HEPARIN SODIUM 5000 UNITS: 5000 INJECTION INTRAVENOUS; SUBCUTANEOUS at 08:27

## 2021-04-05 RX ADMIN — ISOSORBIDE MONONITRATE 60 MG: 60 TABLET, EXTENDED RELEASE ORAL at 22:05

## 2021-04-05 RX ADMIN — CARVEDILOL 25 MG: 25 TABLET, FILM COATED ORAL at 08:19

## 2021-04-05 RX ADMIN — GUAIFENESIN 1200 MG: 600 TABLET, EXTENDED RELEASE ORAL at 22:05

## 2021-04-05 RX ADMIN — TRAMADOL HYDROCHLORIDE 50 MG: 50 TABLET, FILM COATED ORAL at 14:16

## 2021-04-05 RX ADMIN — GABAPENTIN 100 MG: 100 CAPSULE ORAL at 08:18

## 2021-04-05 RX ADMIN — ISOSORBIDE MONONITRATE 60 MG: 60 TABLET, EXTENDED RELEASE ORAL at 08:19

## 2021-04-05 RX ADMIN — PREDNISONE 20 MG: 20 TABLET ORAL at 08:18

## 2021-04-05 RX ADMIN — TRAMADOL HYDROCHLORIDE 50 MG: 50 TABLET, FILM COATED ORAL at 22:05

## 2021-04-05 RX ADMIN — Medication 2000 MG: at 08:27

## 2021-04-05 RX ADMIN — METOCLOPRAMIDE 5 MG: 5 TABLET ORAL at 06:24

## 2021-04-05 RX ADMIN — Medication 2000 MG: at 00:09

## 2021-04-05 RX ADMIN — PANTOPRAZOLE SODIUM 40 MG: 40 TABLET, DELAYED RELEASE ORAL at 08:20

## 2021-04-05 RX ADMIN — NALOXEGOL OXALATE 25 MG: 12.5 TABLET, FILM COATED ORAL at 08:18

## 2021-04-05 RX ADMIN — CARVEDILOL 25 MG: 25 TABLET, FILM COATED ORAL at 17:13

## 2021-04-05 RX ADMIN — POTASSIUM CHLORIDE 20 MEQ: 1500 TABLET, EXTENDED RELEASE ORAL at 08:18

## 2021-04-05 RX ADMIN — GABAPENTIN 300 MG: 300 CAPSULE ORAL at 22:06

## 2021-04-05 RX ADMIN — OXYCODONE 15 MG: 5 TABLET ORAL at 22:05

## 2021-04-05 RX ADMIN — SODIUM CHLORIDE, PRESERVATIVE FREE 10 ML: 5 INJECTION INTRAVENOUS at 22:07

## 2021-04-05 RX ADMIN — OXYCODONE 15 MG: 5 TABLET ORAL at 08:20

## 2021-04-05 RX ADMIN — MAGNESIUM OXIDE TAB 400 MG (240 MG ELEMENTAL MG) 400 MG: 400 (240 MG) TAB at 08:20

## 2021-04-05 RX ADMIN — LEVOTHYROXINE SODIUM 75 MCG: 75 TABLET ORAL at 06:24

## 2021-04-05 RX ADMIN — OXYCODONE 15 MG: 5 TABLET ORAL at 13:01

## 2021-04-05 RX ADMIN — Medication 1 TABLET: at 08:19

## 2021-04-05 RX ADMIN — METOCLOPRAMIDE 5 MG: 5 TABLET ORAL at 15:48

## 2021-04-05 RX ADMIN — FUROSEMIDE 40 MG: 40 TABLET ORAL at 08:18

## 2021-04-05 RX ADMIN — SODIUM CHLORIDE, PRESERVATIVE FREE 10 ML: 5 INJECTION INTRAVENOUS at 08:36

## 2021-04-05 RX ADMIN — GUAIFENESIN 1200 MG: 600 TABLET, EXTENDED RELEASE ORAL at 08:20

## 2021-04-05 RX ADMIN — GABAPENTIN 100 MG: 100 CAPSULE ORAL at 22:06

## 2021-04-05 RX ADMIN — Medication 2000 MG: at 15:48

## 2021-04-05 ASSESSMENT — PAIN SCALES - GENERAL
PAINLEVEL_OUTOF10: 7
PAINLEVEL_OUTOF10: 4
PAINLEVEL_OUTOF10: 7
PAINLEVEL_OUTOF10: 5
PAINLEVEL_OUTOF10: 8

## 2021-04-05 ASSESSMENT — PAIN DESCRIPTION - LOCATION: LOCATION: BACK;STERNUM

## 2021-04-05 ASSESSMENT — PAIN - FUNCTIONAL ASSESSMENT
PAIN_FUNCTIONAL_ASSESSMENT: PREVENTS OR INTERFERES SOME ACTIVE ACTIVITIES AND ADLS
PAIN_FUNCTIONAL_ASSESSMENT: PREVENTS OR INTERFERES SOME ACTIVE ACTIVITIES AND ADLS

## 2021-04-05 ASSESSMENT — PAIN DESCRIPTION - ONSET: ONSET: GRADUAL

## 2021-04-05 ASSESSMENT — PAIN DESCRIPTION - PROGRESSION: CLINICAL_PROGRESSION: NOT CHANGED

## 2021-04-05 ASSESSMENT — PAIN DESCRIPTION - PAIN TYPE: TYPE: ACUTE PAIN

## 2021-04-05 ASSESSMENT — PAIN DESCRIPTION - FREQUENCY: FREQUENCY: CONTINUOUS

## 2021-04-05 ASSESSMENT — PAIN DESCRIPTION - ORIENTATION: ORIENTATION: LOWER

## 2021-04-05 NOTE — PROGRESS NOTES
Amy Torres  [id number]     04/05/21 1443 04/05/21 1509 04/05/21 1510   Restrictions/Precautions   Restrictions/Precautions Fall Risk;Weight Bearing  --   --    Required Braces or Orthoses? Yes  --   --    Lower Extremity Weight Bearing Restrictions   Right Lower Extremity Weight Bearing Weight Bearing As Tolerated  --   --    Left Lower Extremity Weight Bearing Weight Bearing As Tolerated  --   --    Required Braces or Orthoses   Cervical c-collar  --   --    Spinal Lumbar Corset  --   --    Spinal Other LSO  --   --    Position Activity Restriction   Spinal Precautions No Bending; No Lifting; No Twisting  --   --    Subjective   Subjective Pt agreed to therapy this afternoon. --   --    Pain Screening   Patient Currently in Pain Yes  --   --    Pain Assessment   Pain Assessment 0-10  --   --    Pain Level 4  --   --    Patient's Stated Pain Goal No pain  --   --    Pain Type Acute pain  --   --    Pain Location Back;Sternum  --   --    Pain Orientation Lower;Mid  --   --    Pain Descriptors Aching  --   --    Pain Frequency Continuous  --   --    Pain Onset Gradual  --   --    Clinical Progression Gradually improving  --   --    Functional Pain Assessment Prevents or interferes some active activities and ADLs  --   --    Non-Pharmaceutical Pain Intervention(s) Repositioned; Rest  --   --    Response to Pain Intervention Patient Satisfied  --   --    Bed Mobility   Rolling  --  Stand by assistance  --    Supine to Sit  --  Stand by assistance  --    Sit to Supine  --  Stand by assistance;Minimal assistance  (Assist with Aubrey LE into bed)  --    Transfers   Sit to Stand  --   --  Contact guard assistance   Stand to sit  --   --  Contact guard assistance   Ambulation   Ambulation?  --   --   --    WB Status  --   --   --    More Ambulation?  --   --   --    Ambulation 1   Surface  --   --   --    Device  --   --   --    Other Apparatus  --   --   --    Assistance  --   --   --    Quality of Gait  --   --   --    Gait Deviations  --   --   --    Distance  --   --   --    Comments  --   --   --    Ambulation 2   Surface - 2  --   --   --    Device 2  --   --   --    Other Apparatus 2  --   --   --    Assistance 2  --   --   --    Quality of Gait 2  --   --   --    Gait Deviations  --   --   --    Distance  --   --   --    Comments  --   --   --    Stairs/Curb   Stairs?  --   --   --    Wheelchair Activities   Propulsion  --   --   --    Exercises   Comments  --   --   --    Conditions Requiring Skilled Therapeutic Intervention   Body structures, Functions, Activity limitations  --   --   --    Assessment  --   --   --    Activity Tolerance   Activity Tolerance  --   --   --    Safety Devices   Type of devices  --   --   --       04/05/21 1511 04/05/21 1515 04/05/21 1516   Restrictions/Precautions   Restrictions/Precautions  --   --   --    Required Braces or Orthoses?  --   --   --    Lower Extremity Weight Bearing Restrictions   Right Lower Extremity Weight Bearing  --   --   --    Left Lower Extremity Weight Bearing  --   --   --    Required Braces or Orthoses   Cervical  --   --   --    Spinal  --   --   --    Spinal Other  --   --   --    Position Activity Restriction   Spinal Precautions  --   --   --    Subjective   Subjective  --   --   --    Pain Screening   Patient Currently in Pain  --   --   --    Pain Assessment   Pain Assessment  --   --   --    Pain Level  --   --   --    Patient's Stated Pain Goal  --   --   --    Pain Type  --   --   --    Pain Location  --   --   --    Pain Orientation  --   --   --    Pain Descriptors  --   --   --    Pain Frequency  --   --   --    Pain Onset  --   --   --    Clinical Progression  --   --   --    Functional Pain Assessment  --   --   --    Non-Pharmaceutical Pain Intervention(s)  --   --   --    Response to Pain Intervention  --   --   --    Bed Mobility   Rolling  --   --   --    Supine to Sit  --   --   --    Sit to Supine  --   --   --    Transfers   Sit to Stand  --   -- --    Stand to sit  --   --   --    Ambulation   Ambulation? Yes  --   --    WB Status WBAT BLES  --   --    More Ambulation? Yes  --   --    Ambulation 1   Surface level tile  --   --    Device Rolling Walker  --   --    Other Apparatus   (Wound Vac)  --   --    Assistance Contact guard assistance  --   --    Quality of Gait Step to pattern. Flexed posture. No LOB  --   --    Gait Deviations Slow Vania;Decreased step length  --   --    Distance 15'  --   --    Comments From bed to BR back to EOB  --   --    Ambulation 2   Surface - 2 level tile  --   --    Device 2 Rolling Walker  --   --    Other Apparatus 2   (Wound Vac)  --   --    Assistance 2 Contact guard assistance  --   --    Quality of Gait 2 Step to pattern. Flexed posture. No LOB. Antalgic gait pattern. --   --    Gait Deviations Slow Vania;Decreased step length  --   --    Distance 25' x 2  --   --    Comments In room with incoprorated turns. --   --    Stairs/Curb   Stairs? No  --   --    Wheelchair Activities   Propulsion No  --   --    Exercises   Comments  --  Assisted to BR and back to EOB with RW.  --    Conditions Requiring Skilled Therapeutic Intervention   Body structures, Functions, Activity limitations  --   --  Decreased functional mobility ; Decreased ROM; Decreased strength;Decreased endurance;Decreased balance;Decreased fine motor control;Decreased coordination; Increased pain   Assessment  --   --  Pt felt much better this afternoon. Amb well with an antalgic gait pattern, no LOB noted. Maintained a slow and steady pace. Assisted pt to BR and then back to EOB. Activity Tolerance   Activity Tolerance  --  Patient Tolerated treatment well  --    Safety Devices   Type of devices  --   --   --       04/05/21 1519   Restrictions/Precautions   Restrictions/Precautions  --    Required Braces or Orthoses?   --    Lower Extremity Weight Bearing Restrictions   Right Lower Extremity Weight Bearing  --    Left Lower Extremity Weight Bearing --    Required Braces or Orthoses   Cervical  --    Spinal  --    Spinal Other  --    Position Activity Restriction   Spinal Precautions  --    Subjective   Subjective  --    Pain Screening   Patient Currently in Pain  --    Pain Assessment   Pain Assessment  --    Pain Level  --    Patient's Stated Pain Goal  --    Pain Type  --    Pain Location  --    Pain Orientation  --    Pain Descriptors  --    Pain Frequency  --    Pain Onset  --    Clinical Progression  --    Functional Pain Assessment  --    Non-Pharmaceutical Pain Intervention(s)  --    Response to Pain Intervention  --    Bed Mobility   Rolling  --    Supine to Sit  --    Sit to Supine  --    Transfers   Sit to Stand  --    Stand to sit  --    Ambulation   Ambulation?  --    WB Status  --    More Ambulation? --    Ambulation 1   Surface  --    Device  --    Other Apparatus  --    Assistance  --    Quality of Gait  --    Gait Deviations  --    Distance  --    Comments  --    Ambulation 2   Surface - 2  --    Device 2  --    Other Apparatus 2  --    Assistance 2  --    Quality of Gait 2  --    Gait Deviations  --    Distance  --    Comments  --    Stairs/Curb   Stairs?  --    Wheelchair Activities   Propulsion  --    Exercises   Comments  --    Conditions Requiring Skilled Therapeutic Intervention   Body structures, Functions, Activity limitations  --    Assessment  --    Activity Tolerance   Activity Tolerance  --    Safety Devices   Type of devices Call light within reach; Bed alarm in place; Left in bed   Electronically signed by Jaun Sarkar PTA on 4/5/2021 at 3:23 PM

## 2021-04-05 NOTE — PROGRESS NOTES
placement; joint replacement; and Revision total knee arthroplasty (Right, 12/6/2018).     Restrictions  Restrictions/Precautions  Restrictions/Precautions: Fall Risk, Weight Bearing  Required Braces or Orthoses?: Yes  Lower Extremity Weight Bearing Restrictions  Right Lower Extremity Weight Bearing: Weight Bearing As Tolerated  Left Lower Extremity Weight Bearing: Weight Bearing As Tolerated  Required Braces or Orthoses  Cervical: c-collar  Spinal: Lumbar Corset  Spinal Other: LSO  Position Activity Restriction  Spinal Precautions: No Bending, No Lifting, No Twisting  Subjective   General  Chart Reviewed: Orders, Yes  Patient assessed for rehabilitation services?: Yes  Additional Pertinent Hx: 1/14 torn R gluteal repair,2/18 pacemaker interrogation workup--no abnormalities, 2/9 R gluteal wound debridement with wound vac initiation, psoas abscess--[facility] admit soon after, 3/3 and underwent cervical corpectomy and decompression, returned to LT[facility] on 3/6,L1-L2 discectomy on 3/23/21  Pain Assessment  Pain Assessment: 0-10  Pain Level: 8  Pain Location: Back  Pain Orientation: Lower  Pain Descriptors: Aching  Pain Frequency: Continuous  Non-Pharmaceutical Pain Intervention(s): Repositioned  Response to Pain Intervention: Patient Satisfied  Pre Treatment Pain Screening  Intervention List: Patient able to continue with treatment  Vital Signs  Patient Currently in Pain: Yes      Objective          Balance  Sitting Balance: Supervision  Standing Balance: Contact guard assistance  Functional Mobility  Functional - Mobility Device: Rolling Walker  Activity: Other(Short distances in room)  Assist Level: Contact guard assistance  Wheelchair Bed Transfers  Wheelchair/Bed - Technique: Ambulating  Equipment Used: Bed;Wheelchair  Level of Asssistance: Contact guard assistance  Wheelchair Transfers Comments: RW     Transfers  Stand Step Transfers: Contact guard assistance  Transfer Comments: bed-.> w/c using RW           Exercises Shoulder Flexion: BUE 2# x 10 reps x 3 sets to 90 degrees  Shoulder Extension: BUE 2# x 10 reps x 3 sets  Shoulder ABduction: BUE 2# x 5 reps x 3 sets to 90 degrees  Shoulder ADduction: BUE 2# x 5 reps x 3 sets  Elbow Flexion: BUE 2# x 10 reps x 3 sets  Elbow Extension: BUE 2# x 10 reps x 3 sets  Supination: BUE 2# x 10 reps x 3 sets  Pronation: BUE 2# x 10 reps x 3 sets    Plan   Plan  Times per week: 5  Times per day: Twice a day  Current Treatment Recommendations: Self-Care / ADL, Safety Education & Training, Pain Management, Endurance Training, Functional Mobility Training, Strengthening, Patient/Caregiver Education & Training, Home Management Training, Equipment Evaluation, Education, & procurement, Balance Training, Positioning    Goals  Short term goals  Time Frame for Short term goals: 1 week  Short term goal 1: MET  Short term goal 2: pt will complete LB dressing with  min A  Short term goal 3: pt will complete overall bathing with min A  Short term goal 4: pt will complete simple ambulatory home making task with min A  Short term goal 5: pt will don/doff back brace with independence  Short term goal 6: pt will complete 1-2 handed standing task for 3 mins with CGA  Short term goal 7: pt will complete R FM/sensory acts to improve function of hand during ADL task  Short term goal 8: Pt will increase B  strength by 2#. Short term goal 9: Pt will decrease B 9 hole peg test time by 2 seconds.   Long term goals  Time Frame for Long term goals : 2 weeks  Long term goal 1: complete overall dressing with supervision  Long term goal 2: complete overall bathing with supervision  Long term goal 3: complete overall toileting with supervision  Long term goal 4: complete simple ambulatory home making task with supervision  Long term goal 5: complete HEP with independence       Therapy Time   Individual Concurrent Group Co-treatment   Time In 0900         Time Out 1000         Minutes 60         Timed Code Treatment Minutes: [address]

## 2021-04-05 NOTE — PATIENT CARE CONFERENCE
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 I&D PSOAS ABCESS      PHYSICAL THERAPY  STRENGTH  Strength RLE  Comment: 2-/5 HIP, 3/5 KNEE, 3+/5 ANKLE     ROM  AROM RLE (degrees)  RLE General AROM: DECREASED AT HIP  AROM LLE (degrees)  LLE AROM : WFL  BED MOBILITY  Bed Mobility  Rolling: Stand by assistance  Supine to Sit: Stand by assistance  Sit to Supine: Stand by assistance, Minimal assistance(Assist with Aubrey LE into bed)  Scooting: Contact guard assistance  Comment: log roll  TRANSFERS  Transfers  Sit to Stand: Contact guard assistance  Stand to sit: Contact guard assistance  Bed to Chair: Contact guard assistance(with RW from W/C to bed)  Stand Pivot Transfers: Contact guard assistance(RW)  Lateral Transfers: Minimal Assistance, Contact guard assistance(W/C to Recliner using RW)  Comment: LUE on w/c and RUE on walker going sit to stand. WHEELCHAIR PROPULSION  Propulsion 1  Propulsion: Manual  Level: Level Tile  Method: RUBINA FITZPATRICK  Level of Assistance: Stand by assistance, Contact guard assistance  Description/ Details: 2 TURNS  Distance: 50  AMBULATION  Ambulation 1  Surface: level tile  Device: Rolling Walker  Other Apparatus: Wheelchair follow  Assistance: Contact guard assistance  Quality of Gait: Step to pattern. Gait Deviations: Slow Vania, Decreased step length  Distance: 2x35'  Comments: One rest break. No turns.    STAIRS     GOALS:  Short term goals  Time Frame for Short term goals: 1 WEEK  Short term goal 1: CGA FOR ON BED MOBILITY  Short term goal 2: CGA FOR TRANSFERS  Short term goal 3: CGA AMB 50 FEET WITH AD  Short term goal 4: INDEP W/C PROPEL 50 FEET  Short term goal 5: CGA CAR TRANSFER    Long term goals  Time Frame for Long term goals : 2 WEEKS  Long term goal 1: INDEP ON BED AND TRANSFERS  Long term goal 2: INDPE  FEET WITH AD  Long term goal 3: INDPE W/C PROPEL 150 FEET  Long term goal 4: INDEP CAR TRANSFER  Long term goal 5: INDEP 4 STEP     ASSESSMENT:  Assessment: Pt. CGA with transfers and ambulation. SPEECH THERAPY  The patient completed the CLQT today. She still exhibits difficulty with divergent naming and short term recall. Speech will continue to address this during therapy.     Mental manipulation tasks were completed. She was able to complete reverse recall of three digits with 100%. Paragraph recall tasks were completed at 60%. She was able to identify the largest number from a group of four at 70%.      Divergent naming tasks were completed and she was able to name up to eight items when given one minute.     She was able to provide words to definitions at 100%. She believes she is still having word finding difficulty during conversation.     Short term recall tasks were completed with delay and distraction. She was able to recall three unrelated words following one minute, five minute and ten minute delays.      Recommended Diet:  Solid consistency: Dental Soft  Liquid consistency: Thin  Liquid administration via: Cup;Straw     Medication administration: (Meds whole with water)     Safe Swallow Protocol:  Compensatory Swallowing Strategies:  Alternate solids and liquids;Upright as possible for all oral intake;Remain upright for 30-45 minutes after meals         SHORT TERM GOAL #1:  Goal 1: The patient will provide 15 members in a given category with 90% accuracy and (min)cues in the form of a visual aid, semantic/phonemic cueing, etc.     SHORT TERM GOAL #2:  Goal 2: The patient will demonstrate functional problem solving and safety awareness with 90% verbal,tactile and visual cues for daily living tasks in order to increase safe interaction with environmentand decreased assistance from caregivers     SHORT TERM GOAL #3:  Goal 3: The patient will demonstrate recall of functional information following a/an (immediate, shortterm,long-term) delay with min cues in order to increase functional integration into environment.     SHORT TERM GOAL #4:  Goal 4: The patient will recall 8 facts from (sentence, paragraph, several paragraph, page) length material with 90% accuracy and min cueing.     SHORT TERM GOAL #5:     Swallowing Short Term Goals  Short-term Goals  Goal 1: The patient will tolerate a dysphagia soft and bite sized diet with thin liquids with minimal overt s/s of aspiration. Goal 2: Staff/caregivers will complete daily oral hygiene to prevent aspiration of bacteria laden secretions. Goal 3: The patient will participate in a modified barium swallow study to futher assess swallow function. met     Comprehension: 4 - Patient understands basic needs 75-90%+ of the time  Expression: 4 - Expresses basic ideas/needs 75-90%+ of the time  Social Interaction: 4 - Patient appropriate 75-90%+ of the time  Problem Solving: 3 - Patient solves simple/routine tasks 50%-74%  Memory: 3 - Patient remembers 50%-74% of the time       OCCUPATIONAL THERAPY    CURRENT IRF-CAT SCORES  Eating: CARE Score: 4       Oral Hygiene: CARE Score: 5        Toileting: CARE Score: 4  Comment: CGA      Shower/Bathe: CARE Score: 3  Comment: Mod A, shower        Upper Body Dressing: CARE Score: 2  Comment: Mod A for pullover shirt, Max A with c-collar      Lower Body Dressing: CARE Score: 2  Comment: Will need to assess AE       Footwear: CARE Score: 2  Comment: VC/supervision with donning/doffing socks using AE, Max A with shoes, attempted shoe horn, but unable.        Toilet Transfers: CARE Score: 4  Comment: CGA, w/c<>toilet using GB       Picking Up Object:  CARE Score: 88          UE Functioning:  B UE WFLs    Pain Assessment:  Pain Level: 8  Pain Location: Back    STGs:  Short term goals  Time Frame for Short term goals: 1 week  Short term goal 1: MET  Short term goal 2: pt will complete LB dressing with  min A  Short term goal 3: pt will complete overall bathing with min A  Short term goal 4: pt will complete simple ambulatory home making task with min A  Short term goal 5: pt will don/doff back brace with independence  Short term goal 6: pt will complete 1-2 handed standing task for 3 mins with CGA  Short term goal 7: pt will complete R FM/sensory acts to improve function of hand during ADL task  Short term goal 8: Pt will increase B  strength by 2#. Short term goal 9: Pt will decrease B 9 hole peg test time by 2 seconds. LTGs:  Long term goals  Time Frame for Long term goals : 2 weeks  Long term goal 1: complete overall dressing with supervision  Long term goal 2: complete overall bathing with supervision  Long term goal 3: complete overall toileting with supervision  Long term goal 4: complete simple ambulatory home making task with supervision  Long term goal 5: complete HEP with independence    Assessment:  Performance deficits / Impairments: Decreased high-level IADLs, Decreased functional mobility , Decreased endurance, Decreased ADL status, Decreased balance, Decreased strength, Decreased coordination, Decreased sensation                  NUTRITION  Current Wt: Weight: 198 lb 0.7 oz (89.8 kg) / Body mass index is 31.97 kg/m². Admission Wt: Admission Body Weight: 205 lb (93 kg)  Oral Diet Orders: General; Dental Soft  Oral Nutrition Supplement (ONS) Orders:Ensure Enlive BID; Ramiro BID    Pt continues to improve nutritionally AEB adequate po intakes (>50% most meals) and stable wt since previous assessment. Pt reports drinking 100% ONS (Ramiro and Ensures). Encouraged continued good po intake and high protein intake for wound healing. Please see nutrition notes for further details.       NURSING    Wounds/Incisions/Ulcers:   Right anterior incision to neck MERY with Prineo and Right hip wound with Wound Vac     Varinder Scale Score: 17    Pain: Roxicodone 15 mg qid and Ultram 50 mg tid for pain control, gabapentin Consultations/Labs/X-rays:   Routine labs on Monday and Thursdays. Consults with Dr. Bette Jose and Dr. Nannette Egan    Family Education: Need to make contact with family to initiate education prior to discharge    Fall Risk:  Christian Score: 80    Fall in the last week? No falls this hospital stay      Other Nursing Issues:   Patient is Alert and oriented. Incontinent of bladder at times and continent of bowel with last BM on 04/04/21. Left pacemaker. C-Collar at all times. Assist x 1 for transfers with TLSO brace on when up. Heparin 5000 Units q12hrs. Patient is on Dental soft diet and takes meds whole with apple sause. SOCIAL WORK/CASE MANAGEMENT  Assessment: Cooperative, Has family support-son, Dr. Alvin Merida expects to maximize her care, understanding the full scope of her needs    Discharge Plan   Estimated Length of Stay: 4/12/21  Destination: skilled nursing facility, Referral made to [facility]: No    Services at Discharge: SNF Physical Therapy, Occupational Therapy, Speech Therapy and Nursing, wound care and IV antibiotics    Equipment at Discharge: To be issued at facility    Progress made in the prior week:  1. CGA for toileting  2. IMPROVED AMBULATORY DISTANCE  3.  4.  5.      Goals for following week:  1. Min A for UB/LB dressing  2. INDEP BED MOB AND TF  3.   4.   5.     Factors facilitating achievement of predicted outcomes:  Motivated, Cooperative and Pleasant    Barriers to the achievement of predicted outcomes: Pain, Decreased endurance, Upper extremity weakness and Lower extremity weakness    Team Members Present at Conference:  : Coby Arango [facility]   Occupational Therapist: Abbey Coker, OTR/L  Physical Therapist: Tj Blevins PT,DPT  Speech Therapist: Ramona Bustos [address], [id number] [facility]  Nurse: Antonia Messer, RN,BSN   Nurse Manager:  Antonia Messer, RN, BSN  Dietitian:  Lulu Banks, MS, RD, LD  Rehab Director:  Easton Mansfield approve the established interdisciplinary plan of care as documented within the medical record of Matty Forbes.

## 2021-04-05 NOTE — PROGRESS NOTES
Patient:   Srinivasa Delgado  MR#:    [id number]   Room:    0820/820-01   YOB: 1955  Date of Progress Note: 4/5/2021  Time of Note                           8:21 AM  Consulting Physician:   Rene Yun M.D. Attending Physician:  Rene Yun MD     CHIEF COMPLAINT: Generalized weakness and deconditioning     Subjective  This 77 y.o. female  admitted to [facility] initially on 1/14/2021. The patient had been in her usual state of health when she developed a torn right gluteal muscle. She underwent surgical repair on 1/14/21 and post-operative course was progressing. At her 4 week follow up, she developed increased bloody drainage from the wound with erythema. She presented for injection due to RA at which time she suffered a syncopal event. This was felt to be a vasovagal response and she returned to home. She was found later by a family member in the floor for an unknown length of time and the dressing to the right hip/gluteal wound was saturated. She presented to the hospital 2/8 and was noted to be febrile on intake. Workup revealed an elevated CPK and d dimer. Wound cultures were obtained. The patient was admitted to the service of the hospitalists at that time. She underwent pacemaker interrogation in workup for her syncopal episodes which detected no abnormalities. MRI brain negative for acute process. Purulent drainage from the wound was cultured and the patient was started on broad spectrum antibiotics. She was seen in consultation by orthopedics. Blood cultures returned positive for MSSA in all bottles. ID was consulted for antibiotic management at that time. She underwent debridement of the wound per ortho on 2/9. Post-operatively, the wound measures 20 cm x 6 cm and wound vac therapy was initiated. Surveillance cultures remained positive and surgical wound cultures positive for e. Coli. She continued with antibiotic therapy with Azactam under direction of ID.  Due to persistent PT,OT and/or speech notes reviewed    Lab Results   Component Value Date    WBC 6.8 04/05/2021    HGB 9.2 (L) 04/05/2021    HCT 31.5 (L) 04/05/2021    MCV 97.2 04/05/2021     04/05/2021     Lab Results   Component Value Date     04/05/2021    K 4.3 04/05/2021    CL 97 (L) 04/05/2021    CO2 34 (H) 04/05/2021    BUN 20 04/05/2021    CREATININE 0.4 (L) 04/05/2021    GLUCOSE 82 04/05/2021    CALCIUM 8.7 (L) 04/05/2021    PROT 5.4 (L) 03/29/2021    LABALBU 2.5 (L) 03/29/2021    BILITOT 0.5 03/29/2021    ALKPHOS 102 03/29/2021    AST 11 03/29/2021    ALT <5 (A) 03/29/2021    LABGLOM >60 04/05/2021    GFRAA >59 04/05/2021     Lab Results   Component Value Date    INR 1.20 (H) 12/05/2018    INR 1.14 09/04/2018    INR 1.08 07/10/2018     Lab Results   Component Value Date    CRP 13.94 (H) 03/29/2021 04/02/21 1441 04/02/21 1442 04/02/21 1444   Restrictions/Precautions   Restrictions/Precautions Fall Risk;Weight Bearing  --   --    Required Braces or Orthoses? Yes  --   --    Lower Extremity Weight Bearing Restrictions   Right Lower Extremity Weight Bearing Weight Bearing As Tolerated  --   --    Left Lower Extremity Weight Bearing Weight Bearing As Tolerated  --   --    Required Braces or Orthoses   Cervical c-collar  --   --    Spinal Lumbar Corset  --   --    Spinal Other LSO  --   --    Position Activity Restriction   Spinal Precautions No Bending; No Lifting; No Twisting  --   --    Subjective   Subjective  --  Pt agreed to therapy this afternoon. --    Pain Screening   Patient Currently in Pain  --  Yes  --    Pain Assessment   Pain Assessment  --  0-10  --    Pain Level  --   --   --    Pain Type  --  Acute pain  --    Pain Location  --  Hip; Shoulder;Sternum  --    Pain Orientation  --  Right  --    Pain Descriptors  --  Discomfort  --    Pain Frequency  --  Continuous  --    Pain Onset  --  On-going  --    Clinical Progression  --  Not changed  --    Functional Pain Assessment  --  Prevents or interferes some active activities and ADLs  --    Non-Pharmaceutical Pain Intervention(s)  --  Rest;Repositioned  --    Response to Pain Intervention  --  Patient Satisfied  --    Bed Mobility   Rolling  --   --  Stand by assistance   Supine to Sit  --   --  Stand by assistance   Sit to Supine  --   --  Stand by assistance   Transfers   Sit to Stand  --   --  Contact guard assistance   Stand to sit  --   --  Contact guard assistance   Ambulation   Ambulation?  --   --   --    WB Status  --   --   --    Ambulation 1   Surface  --   --   --    Device  --   --   --    Assistance  --   --   --    Quality of Gait  --   --   --    Gait Deviations  --   --   --    Distance  --   --   --    Comments  --   --   --    Exercises   Comments  --   --   --    Conditions Requiring Skilled Therapeutic Intervention   Body structures, Functions, Activity limitations  --   --   --    Assessment  --   --   --    Activity Tolerance   Activity Tolerance  --   --   --    Safety Devices   Type of devices  --   --   --         Objective    Balance  Sitting Balance: Supervision  Standing Balance: Contact guard assistance  Functional Mobility  Functional - Mobility Device: Rolling Walker  Activity: Other  Assist Level: Contact guard assistance  Functional Mobility Comments: Short distance  Transfers  Stand Step Transfers: Contact guard assistance  Sit to stand: Contact guard assistance  Stand to sit: Contact guard assistance  Transfer Comments: to/from RW,w/c<>stretcher  Type of ROM/Therapeutic Exercise  Type of ROM/Therapeutic Exercise: Free weights;AROM  Comment: AROM proximally; 2# distally: 2 sets of 10 in all planes     EXAMINATION: XR HIP BILATERAL W AP PELVIS (2 VIEWS) 4/2/2021 11:46 AM   HISTORY: Right hip popped. AP view the pelvis AP and lateral view the right hip, AP and lateral   views the left hips obtained. SI joints are normal. The symphysis is unremarkable. The right femoral head and neck are intact.    Left femoral head and neck are intact. Incidental note is made of oral contrast in the colon. Postsurgical change from instrumented fusion L3-L4 and L5 with   interbody disc space devices at L3-4 and L4-L5 levels.       Impression   Negative AP pelvis and bilateral hips       RECORD REVIEW: Previous medical records, medications were reviewed at today's visit    IMPRESSION:   1. Right psoas abscess and L1/2 discitis/osteomyelitis with generalized weakness and deconditioning. Continue Ancef for another 8 to 12 weeks most likely. PT/OT for generalized weakness and deconditioning. Continue wound VAC for right gluteal tear/repair  2. GIcontinue with medications for constipation  3. DVT prophylaxissubcu heparin  4. Rheumatoid arthritislow-dose prednisone  5. Coronary artery diseasecontinue Imdur  6. Hypomagnesemia/hypokalemiareplacement ordered  Appreciate ID assistance  Added 300 mg gabapentin at bedtime to help her get through the night  Prednisone increased for a few days and then will reduce to 5 mg a day. Lidoderm patch for costochondritis  See if cervical collar can be removed due to costochondritis symptoms. Negative hip x-ray over the weekend.   ELOS:4/12, likely snf

## 2021-04-05 NOTE — PROGRESS NOTES
Shonna Paris  [id number]     04/05/21 1033 04/05/21 1034 04/05/21 1036   Restrictions/Precautions   Restrictions/Precautions Fall Risk;Weight Bearing  --   --    Required Braces or Orthoses? Yes  --   --    Lower Extremity Weight Bearing Restrictions   Right Lower Extremity Weight Bearing Weight Bearing As Tolerated  --   --    Left Lower Extremity Weight Bearing Weight Bearing As Tolerated  --   --    Required Braces or Orthoses   Cervical c-collar  --   --    Spinal Lumbar Corset  --   --    Spinal Other LSO  --   --    Position Activity Restriction   Spinal Precautions No Bending; No Lifting; No Twisting  --   --    Subjective   Subjective  --   --  Pt agreed  to therapy this morning. Requested to do therapy in bed due to increased pain this morning. Pain Screening   Patient Currently in Pain  --   --  Yes   Pain Assessment   Pain Assessment  --   --  0-10   Pain Level  --   --  7   Patient's Stated Pain Goal  --   --  No pain   Pain Type  --   --  Acute pain   Pain Location  --   --  Back;Sternum   Pain Orientation  --   --  Lower;Mid   Pain Descriptors  --   --  Aching   Pain Frequency  --   --  Continuous   Pain Onset  --   --  Gradual   Clinical Progression  --   --  Not changed   Functional Pain Assessment  --   --  Prevents or interferes some active activities and ADLs   Non-Pharmaceutical Pain Intervention(s)  --   --  Repositioned; Rest   Response to Pain Intervention  --   --  Patient Satisfied   Bed Mobility   Rolling Stand by assistance  --   --    Supine to Sit Stand by assistance  --   --    Sit to Supine Stand by assistance  --   --    Transfers   Sit to Stand  --  Contact guard assistance  --    Stand to sit  --  Contact guard assistance  --    Bed to Chair  --  Contact guard assistance  (with RW from W/C to bed)  --    Ambulation   Ambulation?  --  No  --    Stairs/Curb   Stairs?   --  No  --    Wheelchair Activities   Propulsion  --  No  --    Exercises   Comments  --  Sitting EOB Aubrey LE LAQ x 10 and ankle pumps x 20. Supine Aubrey LE ther ex 10-15 reps. Assisted pt to BR and back to bed. --    Conditions Requiring Skilled Therapeutic Intervention   Body structures, Functions, Activity limitations  --   --   --    Assessment  --   --   --    Safety Devices   Type of devices  --   --   --       04/05/21 1038 04/05/21 1052   Restrictions/Precautions   Restrictions/Precautions  --   --    Required Braces or Orthoses?  --   --    Lower Extremity Weight Bearing Restrictions   Right Lower Extremity Weight Bearing  --   --    Left Lower Extremity Weight Bearing  --   --    Required Braces or Orthoses   Cervical  --   --    Spinal  --   --    Spinal Other  --   --    Position Activity Restriction   Spinal Precautions  --   --    Subjective   Subjective  --   --    Pain Screening   Patient Currently in Pain  --   --    Pain Assessment   Pain Assessment  --   --    Pain Level  --   --    Patient's Stated Pain Goal  --   --    Pain Type  --   --    Pain Location  --   --    Pain Orientation  --   --    Pain Descriptors  --   --    Pain Frequency  --   --    Pain Onset  --   --    Clinical Progression  --   --    Functional Pain Assessment  --   --    Non-Pharmaceutical Pain Intervention(s)  --   --    Response to Pain Intervention  --   --    Bed Mobility   Rolling  --   --    Supine to Sit  --   --    Sit to Supine  --   --    Transfers   Sit to Stand  --   --    Stand to sit  --   --    Bed to Chair  --   --    Ambulation   Ambulation?  --   --    Stairs/Curb   Stairs?  --   --    Wheelchair Activities   Propulsion  --   --    Exercises   Comments  --   --    Conditions Requiring Skilled Therapeutic Intervention   Body structures, Functions, Activity limitations Decreased functional mobility ; Decreased ROM; Decreased strength;Decreased endurance;Decreased balance;Decreased fine motor control;Decreased coordination; Increased pain  --    Assessment Pt stated she was in alot of pain and discomfort this morning and requested to do therapy in bed. Pt tolerated Sitting EOB and Supine Aubrey LE ther ex well with frequent breaks. Minimal fatigue and discomfort noted. Assisted pt to BR and back to bed.  --    Safety Devices   Type of devices  --  Call light within reach; Bed alarm in place; Left in bed   Electronically signed by Sanchez Mcghee PTA on 4/5/2021 at 10:54 AM

## 2021-04-05 NOTE — PROGRESS NOTES
INFECTIOUS DISEASES PROGRESS NOTE    Patient:  Eddye Junes  YOB: 1955  MRN: [id number]   Admit date: 3/26/2021   Admitting Physician: Trace Lewis MD  Primary Care Physician: Kenan Chan MD    CHIEF COMPLAINT:  Right hip pain      Interval History: Patient was up in the bathroom. She is getting cleaned up. She reports she is doing well but still having some hip pain. She states is mainly in the area where the wound is and is not necessarily deep. Allergies:    Allergies   Allergen Reactions    Amoxicillin Rash    Lipitor Rash    Niaspan [Niacin Er] Rash    Penicillins Rash    Relafen [Nabumetone] Rash    Zocor [Simvastatin] Rash     Muscle cramps       Current Meds: metoclopramide (REGLAN) tablet 5 mg, TID AC  ceFAZolin (ANCEF) 2000 mg in 0.9% sodium chloride 50 mL IVPB, Q8H  predniSONE (DELTASONE) tablet 20 mg, Daily    Followed by  Ronaldo Jonas ON 4/7/2021] predniSONE (DELTASONE) tablet 10 mg, Daily    Followed by  Ronaldo Jonas ON 4/10/2021] predniSONE (DELTASONE) tablet 5 mg, Daily  magnesium oxide (MAG-OX) tablet 400 mg, Daily  potassium chloride (KLOR-CON M) extended release tablet 20 mEq, Daily with breakfast  gabapentin (NEURONTIN) capsule 300 mg, Nightly  miconazole (MICOTIN) 2 % powder, PRN  traMADol (ULTRAM) tablet 50 mg, TID  sodium chloride flush 0.9 % injection 10 mL, PRN  sodium chloride flush 0.9 % injection 10 mL, BID  oxyCODONE (ROXICODONE) immediate release tablet 15 mg, 4x Daily  furosemide (LASIX) tablet 40 mg, Daily  levothyroxine (SYNTHROID) tablet 75 mcg, Daily  cloNIDine (CATAPRES) tablet 0.1 mg, BID PRN  acyclovir (ZOVIRAX) capsule 400 mg, BID  pantoprazole (PROTONIX) tablet 40 mg, Daily  docusate sodium (COLACE) capsule 100 mg, BID  therapeutic multivitamin-minerals 1 tablet, Daily  nitroGLYCERIN (NITROSTAT) SL tablet 0.4 mg, Q5 Min PRN  isosorbide mononitrate (IMDUR) extended release tablet 60 mg, BID  carvedilol (COREG) tablet 25 mg, BID WC  guaiFENesin (MUCINEX) extended release tablet 1,200 mg, BID  bisacodyl (DULCOLAX) suppository 10 mg, Daily PRN  budesonide (PULMICORT) nebulizer suspension 500 mcg, BID PRN  cyclobenzaprine (FLEXERIL) tablet 10 mg, TID PRN  gabapentin (NEURONTIN) capsule 100 mg, Q12H  heparin (porcine) injection 5,000 Units, Q12H  lidocaine 4 % external patch 1 patch, Daily  magnesium hydroxide (MILK OF MAGNESIA) 400 MG/5ML suspension 30 mL, Daily PRN  naloxegol (MOVANTIK) tablet 25 mg, QAM  ondansetron (ZOFRAN) tablet 4 mg, Q6H PRN  polyethylene glycol (GLYCOLAX) packet 17 g, Daily  sennosides-docusate sodium (SENOKOT-S) 8.6-50 MG tablet 2 tablet, BID  acetaminophen (TYLENOL) tablet 650 mg, Q4H PRN  bisacodyl (DULCOLAX) suppository 10 mg, Daily PRN        Review of Systems   Constitutional: Negative for fever. Skin: Positive for wound. Vital Signs:  /67   Pulse 72   Temp 97.3 °F (36.3 °C) (Temporal)   Resp 16   Ht 5' 6\" (1.676 m)   Wt 198 lb 0.7 oz (89.8 kg)   SpO2 91%   BMI 31.97 kg/m²   Temp (24hrs), Av.4 °F (36.3 °C), Min:97.3 °F (36.3 °C), Max:97.4 °F (36.3 °C)      Physical Exam   General: Patient's nontoxic-appearing sitting up in the bathroom in no acute distress  HEENT: Sclera anicteric and noninjected  Respiratory: Effort even and unlabored  Wound VAC in place right posterior hip. Clean dry and intact. Neuro: Alert, oriented, speech clear  Psych: Pleasant cooperative      Line/IV site: No erythema,warmth, induration, or tenderness.     LAB RESULTS:    CBC with DIFF:  Recent Labs     21  0354   WBC 6.8   RBC 3.24*   HGB 9.2*   HCT 31.5*   MCV 97.2   MCH 28.4   MCHC 29.2*   RDW 17.1*      MPV 10.3   NEUTOPHILPCT 50.2   LYMPHOPCT 31.2   MONOPCT 12.9*   EOSRELPCT 1.0   BASOPCT 0.7   NEUTROABS 3.4   LYMPHSABS 2.1   MONOSABS 0.90   EOSABS 0.10   BASOSABS 0.10       CMP/BMP:  Recent Labs     21  0354      K 4.3   CL 97*   CO2 34*   ANIONGAP 6*   GLUCOSE 82   BUN 20   CREATININE 0.4*   LABGLOM >60 CALCIUM 8.7*         Culture Results:    No results for input(s): CXSURG in the last 720 hours. Blood Culture Recent: No results for input(s): BC in the last 720 hours. RADIOLOGY    XR HIP BILATERAL W AP PELVIS (2 VIEWS) [[id number]] Resulted: 04/02/21 1147      Order Status: Completed Updated: 04/02/21 1149     Narrative:       EXAMINATION: XR HIP BILATERAL W AP PELVIS (2 VIEWS) 4/2/2021 11:46 AM   HISTORY: Right hip popped. AP view the pelvis AP and lateral view the right hip, AP and lateral   views the left hips obtained. SI joints are normal. The symphysis is unremarkable. The right femoral head and neck are intact. Left femoral head and neck are intact. Incidental note is made of oral contrast in the colon. Postsurgical change from instrumented fusion L3-L4 and L5 with   interbody disc space devices at L3-4 and L4-L5 levels.     Impression:       Negative AP pelvis and bilateral hips   Signed by Dr Jackie Hassan on 4/2/2021 11:47 AM                     Patient Active Problem List   Diagnosis    Coronary artery disease of native artery of native heart with stable angina pectoris ([address]    Hypertension    Rheumatoid arteritis ([address]    History of pulmonary fibrosis    History of DVT (deep vein thrombosis)    Thyroid disease    Intrinsic asthma    Hypercholesterolemia    Syncope, cardiogenic    Near syncope    Status post placement of implantable loop recorder    Left carotid bruit    S/P coronary artery stent placement    Chest pain    Pacemaker    Sinus node dysfunction ([address]    Lumbar stenosis with neurogenic claudication    Spondylolisthesis of lumbar region    Leg swelling    Primary osteoarthritis of right knee    Infection of total right knee replacement ([address]    Myalgia due to statin    Abnormal stress test    Discitis       IMPRESSION:    1. L1/2 discitis/osteomyelitis with right psoas abscess  2.  Methicillin susceptible Staphylococcus aureus bacteremia discitis/osteomyelitis/abscess  3. History of cervical spinal stenosis status post decompression at [facility]  4. Rheumatoid arthritis  5. Right hip wound with wound VAC in place    RECOMMENDATIONS :  · Continue cefazolincurrent plan is for 8 weeks which would be through April 8, 2021 following clearing the bloodstream versus 12 weeks which be May 5, 2021. Dr. Tung Hollingsworth had been leaning towards a longer course therapy.   · Continue wound care with wound care nurse and wound VAC therapy  · Continue physical therapy  · Check CRP with next labs        Marco Antonio Arce MD

## 2021-04-05 NOTE — PROGRESS NOTES
[facility]  INPATIENT SPEECH THERAPY  [facility] [address] [facility] UNIT  TIME   [phone]    [x]Daily Note  []Progress Note    Date: 2021  Patient Name: Alie Varma        MRN: [id number]    Account #: [de-identified]  : 1955  (76 y.o.)  Gender: female   Primary Provider: Jennifer Ellison MD  Precautions: Fall/aspiration   Swallowing Status/Diet: Soft and bite sized with thin liquids      Subjective: Patient alert and cooperative with all therapy tasks. Objective:   Patient completed divergent naming task today. Patient was able to name 15 items within a 1 minute time constraint. When the category was more abstract patient naming 8 items within a 1 minute time constraint. Patient was able to recall recent/daily events with 100% accuracy. Patient also able to recall past trips she had taken with no difficulty. Patient does have some difficulty recalling staff members names. Upon entering patients room she was trying to eat without her bed being raised up. SLP encouraged patient to always have her bed raised when eating and drinking. Patient stated she could not raise it due to her back hurting. Patient did present with cough  x2 after consuming 2 bites of eggs. Medication administration was completed during session. Patient taking meds whole in applesauce 1/2 at a time. Patient taking sips of water between each medication. Patient did present with emesis, however was just a small amount. Do note this is improvement from previous days. Patient forgot to put back brace on while sitting on the side of the bed. Will continue to address this in therapy.      SHORT TERM GOAL #1:  Goal 1: The patient will provide 15 members in a given category with 90% accuracy and (min)cues in the form of a visual aid, semantic/phonemic cueing, etc.    SHORT TERM GOAL #2:  Goal 2: The patient will demonstrate functional problem solving and safety awareness with 90% verbal,tactile and visual cues for daily living tasks in order to care.

## 2021-04-05 NOTE — PROGRESS NOTES
[facility]  Follow-up Progress Note       NAME:  Timbo Ivy  MEDICAL RECORD NUMBER:  [id number]  AGE:  77 y.o. GENDER:  female  :  1955  TODAY'S DATE:  2021    Subjective:   Wound Identification:  Wound Type: Surgical  Contributing Factors: none        Patient Goal of Care:  [] Wound Healing  [] Odor Control  [] Palliative Care  [] Pain Control   [] Other:     Objective:    /67   Pulse 72   Temp 97.3 °F (36.3 °C) (Temporal)   Resp 16   Ht 5' 6\" (1.676 m)   Wt 198 lb 0.7 oz (89.8 kg)   SpO2 91%   BMI 31.97 kg/m²   Varinder Risk Score: Varinder Scale Score: 17  Assessment:   Measurements:  Negative Pressure Wound Therapy Leg Right;Upper; Lateral (Active)   $ Standard NPWT >50 sq cm PER TX $ Yes 21 1439   Wound Type Surgical 21 1439   Unit Type 3M KCI 21 1439   Dressing Type Black foam 21 1439   Number of pieces used 4 21 1439   Cycle Continuous 21 1439   Target Pressure (mmHg) 125 21 1439   Dressing Status Changed 21 1439   Dressing Changed Changed/New 21 1439   Drainage Amount Large 21 1439   Drainage Description Serous; Yellow 21 143   Dressing Change Due 21 1439   Output (ml) 450 ml 21 1439   Wound Assessment Granulation tissue;Red;Slough 21 143   Aixa-wound Assessment Intact 21 1439   Shape Oval 21 1439   Odor None 21 1439   Number of days: 7       Wound 21 Leg Right;Lateral;Upper open incision, deep (Active)   Wound Image   21 1439   Wound Etiology Surgical 21 1439   Dressing Status New dressing applied 21 1439   Wound Cleansed Soap and water;Irrigated with saline 21 1439   Dressing/Treatment Negative pressure wound therapy 21 1439   Dressing Change Due 21 1439   Wound Length (cm) 15.5 cm 21 1439

## 2021-04-06 PROCEDURE — 97530 THERAPEUTIC ACTIVITIES: CPT

## 2021-04-06 PROCEDURE — 97129 THER IVNTJ 1ST 15 MIN: CPT

## 2021-04-06 PROCEDURE — 1180000000 HC REHAB R&B

## 2021-04-06 PROCEDURE — 2580000003 HC RX 258: Performed by: PSYCHIATRY & NEUROLOGY

## 2021-04-06 PROCEDURE — 6370000000 HC RX 637 (ALT 250 FOR IP): Performed by: PSYCHIATRY & NEUROLOGY

## 2021-04-06 PROCEDURE — 97110 THERAPEUTIC EXERCISES: CPT

## 2021-04-06 PROCEDURE — 97116 GAIT TRAINING THERAPY: CPT

## 2021-04-06 PROCEDURE — 92526 ORAL FUNCTION THERAPY: CPT

## 2021-04-06 PROCEDURE — 6360000002 HC RX W HCPCS: Performed by: PSYCHIATRY & NEUROLOGY

## 2021-04-06 PROCEDURE — 97130 THER IVNTJ EA ADDL 15 MIN: CPT

## 2021-04-06 PROCEDURE — 97535 SELF CARE MNGMENT TRAINING: CPT

## 2021-04-06 RX ADMIN — METOCLOPRAMIDE 5 MG: 5 TABLET ORAL at 11:58

## 2021-04-06 RX ADMIN — MAGNESIUM OXIDE TAB 400 MG (240 MG ELEMENTAL MG) 400 MG: 400 (240 MG) TAB at 08:32

## 2021-04-06 RX ADMIN — Medication 2000 MG: at 08:29

## 2021-04-06 RX ADMIN — PANTOPRAZOLE SODIUM 40 MG: 40 TABLET, DELAYED RELEASE ORAL at 08:31

## 2021-04-06 RX ADMIN — HEPARIN SODIUM 5000 UNITS: 5000 INJECTION INTRAVENOUS; SUBCUTANEOUS at 08:30

## 2021-04-06 RX ADMIN — OXYCODONE 15 MG: 5 TABLET ORAL at 08:32

## 2021-04-06 RX ADMIN — SODIUM CHLORIDE, PRESERVATIVE FREE 10 ML: 5 INJECTION INTRAVENOUS at 08:29

## 2021-04-06 RX ADMIN — GABAPENTIN 100 MG: 100 CAPSULE ORAL at 08:32

## 2021-04-06 RX ADMIN — OXYCODONE 15 MG: 5 TABLET ORAL at 20:29

## 2021-04-06 RX ADMIN — Medication 2000 MG: at 00:25

## 2021-04-06 RX ADMIN — ISOSORBIDE MONONITRATE 60 MG: 60 TABLET, EXTENDED RELEASE ORAL at 08:32

## 2021-04-06 RX ADMIN — HEPARIN SODIUM 5000 UNITS: 5000 INJECTION INTRAVENOUS; SUBCUTANEOUS at 20:30

## 2021-04-06 RX ADMIN — METOCLOPRAMIDE 5 MG: 5 TABLET ORAL at 05:52

## 2021-04-06 RX ADMIN — CARVEDILOL 25 MG: 25 TABLET, FILM COATED ORAL at 17:17

## 2021-04-06 RX ADMIN — ACYCLOVIR 400 MG: 200 CAPSULE ORAL at 20:28

## 2021-04-06 RX ADMIN — OXYCODONE 15 MG: 5 TABLET ORAL at 14:11

## 2021-04-06 RX ADMIN — TRAMADOL HYDROCHLORIDE 50 MG: 50 TABLET, FILM COATED ORAL at 20:29

## 2021-04-06 RX ADMIN — DOCUSATE SODIUM 100 MG: 100 CAPSULE, LIQUID FILLED ORAL at 20:29

## 2021-04-06 RX ADMIN — GABAPENTIN 300 MG: 300 CAPSULE ORAL at 20:28

## 2021-04-06 RX ADMIN — GUAIFENESIN 1200 MG: 600 TABLET, EXTENDED RELEASE ORAL at 08:32

## 2021-04-06 RX ADMIN — METOCLOPRAMIDE 5 MG: 5 TABLET ORAL at 17:17

## 2021-04-06 RX ADMIN — ACYCLOVIR 400 MG: 200 CAPSULE ORAL at 08:31

## 2021-04-06 RX ADMIN — PREDNISONE 20 MG: 20 TABLET ORAL at 08:33

## 2021-04-06 RX ADMIN — Medication 2000 MG: at 17:17

## 2021-04-06 RX ADMIN — OXYCODONE 15 MG: 5 TABLET ORAL at 17:17

## 2021-04-06 RX ADMIN — SODIUM CHLORIDE, PRESERVATIVE FREE 10 ML: 5 INJECTION INTRAVENOUS at 20:30

## 2021-04-06 RX ADMIN — LEVOTHYROXINE SODIUM 75 MCG: 75 TABLET ORAL at 05:52

## 2021-04-06 RX ADMIN — GUAIFENESIN 1200 MG: 600 TABLET, EXTENDED RELEASE ORAL at 20:28

## 2021-04-06 RX ADMIN — Medication 1 TABLET: at 08:32

## 2021-04-06 RX ADMIN — TRAMADOL HYDROCHLORIDE 50 MG: 50 TABLET, FILM COATED ORAL at 14:11

## 2021-04-06 RX ADMIN — TRAMADOL HYDROCHLORIDE 50 MG: 50 TABLET, FILM COATED ORAL at 08:31

## 2021-04-06 RX ADMIN — CARVEDILOL 25 MG: 25 TABLET, FILM COATED ORAL at 08:31

## 2021-04-06 RX ADMIN — POTASSIUM CHLORIDE 20 MEQ: 1500 TABLET, EXTENDED RELEASE ORAL at 08:31

## 2021-04-06 RX ADMIN — ISOSORBIDE MONONITRATE 60 MG: 60 TABLET, EXTENDED RELEASE ORAL at 20:28

## 2021-04-06 RX ADMIN — NALOXEGOL OXALATE 25 MG: 12.5 TABLET, FILM COATED ORAL at 08:33

## 2021-04-06 RX ADMIN — GABAPENTIN 100 MG: 100 CAPSULE ORAL at 20:30

## 2021-04-06 ASSESSMENT — PAIN SCALES - GENERAL
PAINLEVEL_OUTOF10: 4
PAINLEVEL_OUTOF10: 5

## 2021-04-06 ASSESSMENT — PAIN DESCRIPTION - FREQUENCY: FREQUENCY: CONTINUOUS

## 2021-04-06 ASSESSMENT — PAIN DESCRIPTION - DESCRIPTORS: DESCRIPTORS: ACHING;DISCOMFORT

## 2021-04-06 ASSESSMENT — PAIN DESCRIPTION - PAIN TYPE: TYPE: CHRONIC PAIN

## 2021-04-06 ASSESSMENT — PAIN DESCRIPTION - PROGRESSION: CLINICAL_PROGRESSION: GRADUALLY IMPROVING

## 2021-04-06 ASSESSMENT — PAIN DESCRIPTION - ORIENTATION
ORIENTATION: RIGHT;LEFT;MID
ORIENTATION: MID;RIGHT;LEFT

## 2021-04-06 ASSESSMENT — PAIN DESCRIPTION - ONSET: ONSET: GRADUAL

## 2021-04-06 ASSESSMENT — PAIN - FUNCTIONAL ASSESSMENT: PAIN_FUNCTIONAL_ASSESSMENT: PREVENTS OR INTERFERES SOME ACTIVE ACTIVITIES AND ADLS

## 2021-04-06 NOTE — PROGRESS NOTES
04/06/21 1000   Restrictions/Precautions   Restrictions/Precautions Fall Risk;Weight Bearing   Required Braces or Orthoses? Yes   Lower Extremity Weight Bearing Restrictions   Right Lower Extremity Weight Bearing Weight Bearing As Tolerated   Left Lower Extremity Weight Bearing Weight Bearing As Tolerated   Required Braces or Orthoses   Cervical c-collar   Spinal Lumbar Corset   Spinal Other LSO   Position Activity Restriction   Spinal Precautions No Bending; No Lifting; No Twisting   Pain Screening   Patient Currently in Pain Yes   Pain Assessment   Pain Assessment 0-10   Pain Level 6   Transfers   Sit to Stand Contact guard assistance   Stand to sit Contact guard assistance   Comment LUE on w/c and RUE on walker going sit to stand. Ambulation   Ambulation? Yes   WB Status WBAT BLES   Ambulation 1   Surface level tile   Device Rolling Walker   Other Apparatus Wheelchair follow   Assistance Contact guard assistance   Quality of Gait Step to pattern. Gait Deviations Slow Vania;Decreased step length   Distance 2x35'   Comments One rest break. No turns. Exercises   Comments LAQ (RLE AAROM; LLE w/manual resistance) 1x10; hip flexion 1x10; hip ABD with manual resistance and 3s/h 2x10; DF with manual resistance 1x10. Conditions Requiring Skilled Therapeutic Intervention   Assessment Pt. CGA with transfers and ambulation. Activity Tolerance   Activity Tolerance Patient Tolerated treatment well   Safety Devices   Type of devices All fall risk precautions in place;Call light within reach; Chair alarm in place; Patient at risk for falls; Left in chair

## 2021-04-06 NOTE — PROGRESS NOTES
Nutrition Assessment     Type and Reason for Visit: Reassess    Nutrition Recommendations/Plan: Continue current POC. Nutrition Assessment:  Pt continues to improve nutritionally AEB adequate po intakes (>50% most meals) and stable wt since previous assessment. Pt reports drinking 100% ONS (Ramiro and Ensures). Encouraged continued good po intake and high protein intake for wound healing. Malnutrition Assessment:  Malnutrition Status:  At risk for malnutrition (Comment)    Nutrition Related Findings: +1 RLE, non-pitting LLE edema      Current Nutrition Therapies:    Dietary Nutrition Supplements: Standard High Calorie Oral Supplement, Wound Healing Oral Supplement  DIET GENERAL; Dental Soft    Anthropometric Measures:  · Height: 5' 6\" (167.6 cm)  · Current Body Wt: 198 lb (89.8 kg)   · BMI: 32    Nutrition Interventions:   Food and/or Nutrient Delivery:  Continue Current Diet, Continue Oral Nutrition Supplement  Coordination of Nutrition Care:  Continue to monitor while inpatient    Goals:  PO intake >50%, wt stable       Nutrition Monitoring and Evaluation:   Food/Nutrient Intake Outcomes:  Food and Nutrient Intake, Supplement Intake  Physical Signs/Symptoms Outcomes:  Biochemical Data, Nutrition Focused Physical Findings, Skin, Weight, Fluid Status or Edema     Discharge Planning:    Continue Oral Nutrition Supplement     Electronically signed by Deshawn Dhillon MS, RD, LD on 4/6/21 at 3:40 PM CDT    Contact: [phone]

## 2021-04-06 NOTE — PLAN OF CARE
Nutrition Problem #1: Inadequate oral intake  Intervention: Food and/or Nutrient Delivery: Continue Current Diet, Continue Oral Nutrition Supplement  Nutritional Goals: PO intake >50%, wt stable      Problem: Nutrition  Goal: Optimal nutrition therapy  4/6/2021 1541 by Justyna Hudson, MS, RD, LD  Outcome: Ongoing  4/6/2021 1525 by Amber Tang RN  Outcome: Ongoing

## 2021-04-06 NOTE — PROGRESS NOTES
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 I&D PSOAS ABCESS     Additional Pertinent Hx: RECENT HX OF CX SX, BACTEREMIA  RESTRICTIONS/PRECAUTIONS:    Restrictions/Precautions  Restrictions/Precautions: Fall Risk, Weight Bearing  Required Braces or Orthoses?: Yes  Position Activity Restriction  Spinal Precautions: No Bending, No Lifting, No Twisting         Subjective: The patient was upright in her wheelchair. She reported 5 out of 10 pain after receiving pain medication. She denies emesis following medications this morning. Objective: The patient completed the CLQT today. She still exhibits difficulty with divergent naming and short term recall. Speech will continue to address this during therapy. Mental manipulation tasks were completed. She was able to complete reverse recall of three digits with 100%. Paragraph recall tasks were completed at 60%. She was able to identify the largest number from a group of four at 70%. Divergent naming tasks were completed and she was able to name up to eight items when given one minute. She was able to provide words to definitions at 100%. She believes she is still having word finding difficulty during conversation. Short term recall tasks were completed with delay and distraction. She was able to recall three unrelated words following one minute, five minute and ten minute delays. Recommended Diet:  Solid consistency: Dental Soft  Liquid consistency:  Thin  Liquid administration via: Cup;Straw     Medication administration: (Meds whole with water)    Safe Swallow Protocol:  Compensatory Swallowing Strategies: Alternate solids and liquids;Upright as possible for all oral intake;Remain upright for 30-45 minutes after meals        SHORT TERM GOAL #1:  Goal 1: The patient will provide 15 members in a given category with 90% accuracy and (min)cues in the form of a visual aid, semantic/phonemic cueing, etc.    SHORT TERM GOAL #2:  Goal 2: The patient will demonstrate functional problem solving and safety awareness with 90% verbal,tactile and visual cues for daily living tasks in order to increase safe interaction with environmentand decreased assistance from caregivers    [address] #3:  Goal 3: The patient will demonstrate recall of functional information following a/an (immediate, shortterm,long-term) delay with min cues in order to increase functional integration into environment. SHORT TERM GOAL #4:  Goal 4: The patient will recall 8 facts from (sentence, paragraph, several paragraph, page) length material with 90% accuracy and min cueing. SHORT TERM GOAL #5:       Swallowing Short Term Goals  Short-term Goals  Goal 1: The patient will tolerate a dysphagia soft and bite sized diet with thin liquids with minimal overt s/s of aspiration. Goal 2: Staff/caregivers will complete daily oral hygiene to prevent aspiration of bacteria laden secretions. Goal 3: The patient will participate in a modified barium swallow study to futher assess swallow function.  met    Comprehension: 4 - Patient understands basic needs 75-90%+ of the time  Expression: 4 - Expresses basic ideas/needs 75-90%+ of the time  Social Interaction: 4 - Patient appropriate 75-90%+ of the time  Problem Solving: 3 - Patient solves simple/routine tasks 50%-74%  Memory: 3 - Patient remembers 50%-74% of the time    ASSESSMENT:  Assessment: [x]Progressing towards goals          []Not Progressing towards goals    Patient Tolerance of Treatment:   [x]Tolerated well []Tolerated fair []Required rest breaks []Fatigued    Education:  Learner:  [x]Patient          []Significant Other          []Other  Education provided regarding:  [x]Goals and POC   []Diet and swallowing precautions    []Home Exercise Program  []Progress and/or discharge information  Method of Education:  [x]Discussion          []Demonstration          []Handout          []Other  Evaluation of Education:   []Verbalized understanding   []Demonstrates without assistance  []Demonstrates with assistance  []Needs further instruction     []No evidence of learning                  []No family present      Plan: [x]Continue with current plan of care    []Modify current plan of care as follows:    []Discharge patient    Discharge Location:    Services/Supervision Recommended:      [x]Patient continues to require treatment by a licensed therapist to address functional deficits as outlined in the established plan of care.     Electronically Signed By:  Carroll Romero M.S., CCC-SLP  4/6/2021,11:27 AM.

## 2021-04-06 NOTE — PROGRESS NOTES
04/06/21 1300   Restrictions/Precautions   Restrictions/Precautions Fall Risk;Weight Bearing   Required Braces or Orthoses? Yes   Lower Extremity Weight Bearing Restrictions   Right Lower Extremity Weight Bearing Weight Bearing As Tolerated   Left Lower Extremity Weight Bearing Weight Bearing As Tolerated   Required Braces or Orthoses   Cervical c-collar   Spinal Lumbar Corset   Spinal Other LSO   Position Activity Restriction   Spinal Precautions No Bending; No Lifting; No Twisting   Pain Screening   Patient Currently in Pain Yes   Pain Assessment   Pain Assessment 0-10   Pain Level 5   Pain Location   (in chest from cervical brace)   Bed Mobility   Sit to Supine Minimal assistance   Scooting Contact guard assistance   Comment Reverse log roll on right side of bed. Transfers   Sit to Stand Contact guard assistance   Stand to sit Contact guard assistance   Comment LUE on w/c and RUE on walker going sit to stand. Ambulation   Ambulation? Yes   WB Status WBAT BLES   Ambulation 1   Surface level tile   Device Rolling Walker   Other Apparatus Wheelchair follow   Assistance Contact guard assistance   Quality of Gait Step to pattern. Gait Deviations Slow Vania;Decreased step length   Distance 1x35' and 1x40'   Comments One rest break. No turns. Exercises   Comments LAQ AAROM on RLE and w/manual resistance on LLE 1x10. Conditions Requiring Skilled Therapeutic Intervention   Assessment Pt. min A with bed mobility, CGA with transfers, and CGA with gait. Activity Tolerance   Activity Tolerance Patient Tolerated treatment well   Safety Devices   Type of devices All fall risk precautions in place; Bed alarm in place;Call light within reach; Patient at risk for falls; Left in bed

## 2021-04-06 NOTE — PROGRESS NOTES
Occupational Therapy  Facility/Department: [facility] 8 REHAB UNIT  Daily Treatment Note  NAME: Kylee Koenig  : 1955  MRN: [id number]    Date of Service: 2021    Discharge Recommendations:  Continue to assess pending progress  OT Equipment Recommendations  Other: To be assessed closer to discharge    Assessment   Performance deficits / Impairments: Decreased high-level IADLs;Decreased functional mobility ; Decreased endurance;Decreased ADL status; Decreased balance;Decreased strength;Decreased coordination;Decreased sensation  Assessment: Patient progressing with Fine Motor. Improvement in ability to roll to Left in Supine and sit->supine  Treatment Diagnosis: R Gluteal abscess, recent cervical decompression, MSSA on long-term antibiotics due to recent discitis,, L1-2 discectomy  Prognosis: Good  Activity Tolerance  Activity Tolerance: Patient Tolerated treatment well  Safety Devices  Type of devices: Left in chair(Transported to Physical Therapy and Staff notified)         Patient Diagnosis(es): There were no encounter diagnoses. has a past medical history of CAD (coronary artery disease), Cellulitis, History of blood transfusion, History of DVT (deep vein thrombosis), History of neutropenia, History of pulmonary fibrosis, Hx of blood clots, Hypercholesterolemia, Hypertension, Intrinsic asthma, Pacemaker, Post op infection, Rheumatoid arteritis ([address] Sinus node dysfunction ([address] Staph aureus infection, Status post placement of implantable loop recorder, Syncope, and Thyroid disease. has a past surgical history that includes Diagnostic Cardiac Cath Lab Procedure; LEEP; Coronary angioplasty with stent (); Pacemaker insertion (2016); back surgery (2018); pr total knee arthroplasty (Right, 2018); pr incis/drain thigh/knee abscess,deep (Right, 10/23/2018); Cardiac catheterization (14  [facility]);  Cardiac catheterization (1/26/15  [facility]); pacemaker placement; joint replacement; and Revision total knee arthroplasty (Right, 12/6/2018).     Restrictions  Restrictions/Precautions  Restrictions/Precautions: Fall Risk, Weight Bearing  Required Braces or Orthoses?: Yes  Lower Extremity Weight Bearing Restrictions  Right Lower Extremity Weight Bearing: Weight Bearing As Tolerated  Left Lower Extremity Weight Bearing: Weight Bearing As Tolerated  Required Braces or Orthoses  Cervical: c-collar  Spinal: Lumbar Corset  Spinal Other: LSO  Position Activity Restriction  Spinal Precautions: No Bending, No Lifting, No Twisting  Subjective   General  Chart Reviewed: Orders, Yes  Patient assessed for rehabilitation services?: Yes  Additional Pertinent Hx: 1/14 torn R gluteal repair,2/18 pacemaker interrogation workup--no abnormalities, 2/9 R gluteal wound debridement with wound vac initiation, psoas abscess--[facility] admit soon after, 3/3 and underwent cervical corpectomy and decompression, returned to [place] [id number] on 3/6,L1-L2 discectomy on 3/23/21  Family / Caregiver Present: No  Pain Assessment  Pain Assessment: 0-10  Pain Level: 3  Pain Type: Chronic pain  Pain Location: Back  Pain Orientation: Mid;Right;Left  Pain Descriptors: Aching  Pain Frequency: Continuous  Non-Pharmaceutical Pain Intervention(s): Repositioned  Pre Treatment Pain Screening  Intervention List: Patient able to continue with treatment  Vital Signs  Patient Currently in Pain: Yes        Objective    ADL  Additional Comments: Sup Donning LSO        Balance  Sitting Balance: Supervision  Standing Balance: Contact guard assistance  Functional Mobility  Functional - Mobility Device: Rolling Walker  Activity: Other(Short distances in room)  Assist Level: Contact guard assistance  Wheelchair Bed Transfers  Wheelchair/Bed - Technique: Ambulating  Equipment Used: Bed;Wheelchair  Level of Asssistance: Contact guard assistance  Wheelchair Transfers Comments: RW  Bed mobility  Rolling to Left: Supervision  Supine to Sit: Supervision           Coordination  MRMT: In-Hand manipulation and turning of pegs x 20 x 3 sets in [address] per week: 5  Times per day: Twice a day  Current Treatment Recommendations: Self-Care / ADL, Safety Education & Training, Pain Management, Endurance Training, Functional Mobility Training, Strengthening, Patient/Caregiver Education & Training, Home Management Training, Equipment Evaluation, Education, & procurement, Balance Training, Positioning       OutComes Score    Upper Body Dressing   Assistance Needed Supervision or touching assistance   Comment Sup A for Donning/Forbes LSO   CARE Score 4       Goals  Short term goals  Time Frame for Short term goals: 1 week  Short term goal 1: MET  Short term goal 2: pt will complete LB dressing with  min A  Short term goal 3: pt will complete overall bathing with min A  Short term goal 4: pt will complete simple ambulatory home making task with min A  Short term goal 5: pt will don/doff back brace with independence  Short term goal 6: pt will complete 1-2 handed standing task for 3 mins with CGA  Short term goal 7: pt will complete R FM/sensory acts to improve function of hand during ADL task  Short term goal 8: Pt will increase B  strength by 2#. Short term goal 9: Pt will decrease B 9 hole peg test time by 2 seconds.   Long term goals  Time Frame for Long term goals : 2 weeks  Long term goal 1: complete overall dressing with supervision  Long term goal 2: complete overall bathing with supervision  Long term goal 3: complete overall toileting with supervision  Long term goal 4: complete simple ambulatory home making task with supervision  Long term goal 5: complete HEP with independence       Therapy Time   Individual Concurrent Group Co-treatment   Time In 0900         Time Out 1000         Minutes 60         Timed Code Treatment Minutes: [address]

## 2021-04-07 PROCEDURE — 97130 THER IVNTJ EA ADDL 15 MIN: CPT

## 2021-04-07 PROCEDURE — 6370000000 HC RX 637 (ALT 250 FOR IP): Performed by: PSYCHIATRY & NEUROLOGY

## 2021-04-07 PROCEDURE — 97110 THERAPEUTIC EXERCISES: CPT

## 2021-04-07 PROCEDURE — 92526 ORAL FUNCTION THERAPY: CPT

## 2021-04-07 PROCEDURE — 2580000003 HC RX 258: Performed by: PSYCHIATRY & NEUROLOGY

## 2021-04-07 PROCEDURE — 1180000000 HC REHAB R&B

## 2021-04-07 PROCEDURE — 97535 SELF CARE MNGMENT TRAINING: CPT

## 2021-04-07 PROCEDURE — 97116 GAIT TRAINING THERAPY: CPT

## 2021-04-07 PROCEDURE — 6360000002 HC RX W HCPCS: Performed by: PSYCHIATRY & NEUROLOGY

## 2021-04-07 PROCEDURE — 97129 THER IVNTJ 1ST 15 MIN: CPT

## 2021-04-07 PROCEDURE — 99233 SBSQ HOSP IP/OBS HIGH 50: CPT | Performed by: PSYCHIATRY & NEUROLOGY

## 2021-04-07 RX ADMIN — TRAMADOL HYDROCHLORIDE 50 MG: 50 TABLET, FILM COATED ORAL at 21:01

## 2021-04-07 RX ADMIN — DOCUSATE SODIUM 100 MG: 100 CAPSULE, LIQUID FILLED ORAL at 08:12

## 2021-04-07 RX ADMIN — SODIUM CHLORIDE, PRESERVATIVE FREE 10 ML: 5 INJECTION INTRAVENOUS at 21:04

## 2021-04-07 RX ADMIN — ACYCLOVIR 400 MG: 200 CAPSULE ORAL at 21:01

## 2021-04-07 RX ADMIN — TRAMADOL HYDROCHLORIDE 50 MG: 50 TABLET, FILM COATED ORAL at 08:12

## 2021-04-07 RX ADMIN — SODIUM CHLORIDE, PRESERVATIVE FREE 10 ML: 5 INJECTION INTRAVENOUS at 08:14

## 2021-04-07 RX ADMIN — CARVEDILOL 25 MG: 25 TABLET, FILM COATED ORAL at 08:13

## 2021-04-07 RX ADMIN — ACYCLOVIR 400 MG: 200 CAPSULE ORAL at 08:13

## 2021-04-07 RX ADMIN — LEVOTHYROXINE SODIUM 75 MCG: 75 TABLET ORAL at 05:33

## 2021-04-07 RX ADMIN — OXYCODONE 15 MG: 5 TABLET ORAL at 08:12

## 2021-04-07 RX ADMIN — METOCLOPRAMIDE 5 MG: 5 TABLET ORAL at 05:33

## 2021-04-07 RX ADMIN — Medication 2000 MG: at 16:11

## 2021-04-07 RX ADMIN — METOCLOPRAMIDE 5 MG: 5 TABLET ORAL at 12:04

## 2021-04-07 RX ADMIN — PANTOPRAZOLE SODIUM 40 MG: 40 TABLET, DELAYED RELEASE ORAL at 08:13

## 2021-04-07 RX ADMIN — GABAPENTIN 300 MG: 300 CAPSULE ORAL at 21:00

## 2021-04-07 RX ADMIN — GABAPENTIN 100 MG: 100 CAPSULE ORAL at 08:13

## 2021-04-07 RX ADMIN — SODIUM CHLORIDE, PRESERVATIVE FREE 10 ML: 5 INJECTION INTRAVENOUS at 16:11

## 2021-04-07 RX ADMIN — TRAMADOL HYDROCHLORIDE 50 MG: 50 TABLET, FILM COATED ORAL at 15:04

## 2021-04-07 RX ADMIN — GABAPENTIN 100 MG: 100 CAPSULE ORAL at 21:04

## 2021-04-07 RX ADMIN — HEPARIN SODIUM 5000 UNITS: 5000 INJECTION INTRAVENOUS; SUBCUTANEOUS at 08:13

## 2021-04-07 RX ADMIN — Medication 2000 MG: at 00:15

## 2021-04-07 RX ADMIN — OXYCODONE 15 MG: 5 TABLET ORAL at 13:11

## 2021-04-07 RX ADMIN — Medication 1 TABLET: at 08:13

## 2021-04-07 RX ADMIN — GUAIFENESIN 1200 MG: 600 TABLET, EXTENDED RELEASE ORAL at 21:02

## 2021-04-07 RX ADMIN — NALOXEGOL OXALATE 25 MG: 12.5 TABLET, FILM COATED ORAL at 08:12

## 2021-04-07 RX ADMIN — ISOSORBIDE MONONITRATE 60 MG: 60 TABLET, EXTENDED RELEASE ORAL at 08:13

## 2021-04-07 RX ADMIN — ISOSORBIDE MONONITRATE 60 MG: 60 TABLET, EXTENDED RELEASE ORAL at 21:02

## 2021-04-07 RX ADMIN — Medication 2000 MG: at 08:11

## 2021-04-07 RX ADMIN — OXYCODONE 15 MG: 5 TABLET ORAL at 16:51

## 2021-04-07 RX ADMIN — OXYCODONE 15 MG: 5 TABLET ORAL at 21:01

## 2021-04-07 RX ADMIN — CARVEDILOL 25 MG: 25 TABLET, FILM COATED ORAL at 16:51

## 2021-04-07 RX ADMIN — PREDNISONE 10 MG: 5 TABLET ORAL at 08:13

## 2021-04-07 RX ADMIN — GUAIFENESIN 1200 MG: 600 TABLET, EXTENDED RELEASE ORAL at 08:12

## 2021-04-07 RX ADMIN — POTASSIUM CHLORIDE 20 MEQ: 1500 TABLET, EXTENDED RELEASE ORAL at 08:13

## 2021-04-07 RX ADMIN — DOCUSATE SODIUM 100 MG: 100 CAPSULE, LIQUID FILLED ORAL at 21:02

## 2021-04-07 RX ADMIN — METOCLOPRAMIDE 5 MG: 5 TABLET ORAL at 16:12

## 2021-04-07 RX ADMIN — HEPARIN SODIUM 5000 UNITS: 5000 INJECTION INTRAVENOUS; SUBCUTANEOUS at 21:14

## 2021-04-07 RX ADMIN — MAGNESIUM OXIDE TAB 400 MG (240 MG ELEMENTAL MG) 400 MG: 400 (240 MG) TAB at 08:13

## 2021-04-07 RX ADMIN — SODIUM CHLORIDE, PRESERVATIVE FREE 10 ML: 5 INJECTION INTRAVENOUS at 00:14

## 2021-04-07 ASSESSMENT — PAIN DESCRIPTION - PAIN TYPE
TYPE: CHRONIC PAIN
TYPE: CHRONIC PAIN

## 2021-04-07 ASSESSMENT — PAIN DESCRIPTION - PROGRESSION: CLINICAL_PROGRESSION: GRADUALLY IMPROVING

## 2021-04-07 ASSESSMENT — PAIN DESCRIPTION - ONSET: ONSET: GRADUAL

## 2021-04-07 ASSESSMENT — PAIN DESCRIPTION - LOCATION: LOCATION: CHEST

## 2021-04-07 ASSESSMENT — PAIN SCALES - GENERAL
PAINLEVEL_OUTOF10: 3
PAINLEVEL_OUTOF10: 4
PAINLEVEL_OUTOF10: 2

## 2021-04-07 ASSESSMENT — PAIN DESCRIPTION - FREQUENCY: FREQUENCY: CONTINUOUS

## 2021-04-07 ASSESSMENT — PAIN DESCRIPTION - DESCRIPTORS: DESCRIPTORS: ACHING;DISCOMFORT

## 2021-04-07 NOTE — PROGRESS NOTES
INFECTIOUS DISEASES PROGRESS NOTE    Patient:  Tariq Jessica  YOB: 1955  MRN: [id number]   Admit date: 3/26/2021   Admitting Physician: Zion Morrison MD  Primary Care Physician: Ander Nascimento MD    CHIEF COMPLAINT: Jean Donahue is the plan\"      Interval History: Patient was lying in bed. FIGUEROA King with wound care was at bedside ready to change her wound VAC. No diarrhea, no rash. Tolerating antibiotics          Allergies:    Allergies   Allergen Reactions    Amoxicillin Rash    Lipitor Rash    Niaspan [Niacin Er] Rash    Penicillins Rash    Relafen [Nabumetone] Rash    Zocor [Simvastatin] Rash     Muscle cramps       Current Meds: metoclopramide (REGLAN) tablet 5 mg, TID AC  ceFAZolin (ANCEF) 2000 mg in 0.9% sodium chloride 50 mL IVPB, Q8H  predniSONE (DELTASONE) tablet 10 mg, Daily    Followed by  Charlean Fulling ON 4/10/2021] predniSONE (DELTASONE) tablet 5 mg, Daily  magnesium oxide (MAG-OX) tablet 400 mg, Daily  potassium chloride (KLOR-CON M) extended release tablet 20 mEq, Daily with breakfast  gabapentin (NEURONTIN) capsule 300 mg, Nightly  miconazole (MICOTIN) 2 % powder, PRN  traMADol (ULTRAM) tablet 50 mg, TID  sodium chloride flush 0.9 % injection 10 mL, PRN  sodium chloride flush 0.9 % injection 10 mL, BID  oxyCODONE (ROXICODONE) immediate release tablet 15 mg, 4x Daily  furosemide (LASIX) tablet 40 mg, Daily  levothyroxine (SYNTHROID) tablet 75 mcg, Daily  cloNIDine (CATAPRES) tablet 0.1 mg, BID PRN  acyclovir (ZOVIRAX) capsule 400 mg, BID  pantoprazole (PROTONIX) tablet 40 mg, Daily  docusate sodium (COLACE) capsule 100 mg, BID  therapeutic multivitamin-minerals 1 tablet, Daily  nitroGLYCERIN (NITROSTAT) SL tablet 0.4 mg, Q5 Min PRN  isosorbide mononitrate (IMDUR) extended release tablet 60 mg, BID  carvedilol (COREG) tablet 25 mg, BID WC  guaiFENesin (MUCINEX) extended release tablet 1,200 mg, BID  bisacodyl (DULCOLAX) suppository 10 mg, Daily PRN  budesonide (PULMICORT) nebulizer suspension Range 3/29/2021 03:10   CRP Latest Ref Range: 0.00 - 0.50 mg/dL 13.94 (H)     Culture Results:    No results for input(s): CXSURG in the last 720 hours. Blood Culture Recent: No results for input(s): BC in the last 720 hours. RADIOLOGY    XR HIP BILATERAL W AP PELVIS (2 VIEWS) [[id number]] Resulted: 04/02/21 1147      Order Status: Completed Updated: 04/02/21 1149     Narrative:       EXAMINATION: XR HIP BILATERAL W AP PELVIS (2 VIEWS) 4/2/2021 11:46 AM   HISTORY: Right hip popped. AP view the pelvis AP and lateral view the right hip, AP and lateral   views the left hips obtained. SI joints are normal. The symphysis is unremarkable. The right femoral head and neck are intact. Left femoral head and neck are intact. Incidental note is made of oral contrast in the colon. Postsurgical change from instrumented fusion L3-L4 and L5 with   interbody disc space devices at L3-4 and L4-L5 levels.     Impression:       Negative AP pelvis and bilateral hips   Signed by Dr Merlin Ensign on 4/2/2021 11:47 AM                     Patient Active Problem List   Diagnosis    Coronary artery disease of native artery of native heart with stable angina pectoris ([address]    Hypertension    Rheumatoid arteritis ([address]    History of pulmonary fibrosis    History of DVT (deep vein thrombosis)    Thyroid disease    Intrinsic asthma    Hypercholesterolemia    Syncope, cardiogenic    Near syncope    Status post placement of implantable loop recorder    Left carotid bruit    S/P coronary artery stent placement    Chest pain    Pacemaker    Sinus node dysfunction ([address]    Lumbar stenosis with neurogenic claudication    Spondylolisthesis of lumbar region    Leg swelling    Primary osteoarthritis of right knee    Infection of total right knee replacement ([address]    Myalgia due to statin    Abnormal stress test    Discitis       IMPRESSION:    1. L1/2 discitis/osteomyelitis with right psoas abscess  2. Methicillin susceptible Staphylococcus aureus bacteremia discitis/osteomyelitis/abscess  3. History of cervical spinal stenosis status post decompression at [facility]  4. Rheumatoid arthritis  5. Right hip wound with wound VAC in place    RECOMMENDATIONS :  · Continue cefazolin8 week Yariel Frias would be  through April 8, 2021 following clearing the bloodstream and at this point plan to continue to monitor weekly for possibly up to 12 weeks which be May 5, 2021. · Continue wound care with wound care nurse and wound VAC therapy>> patient still has extensive and deep wound.   · Continue physical therapy  · CRP ordered for April 8    Discussed with OLIVER Velazquez plan to continue cefazolin for now  Jeffry Dumas MD

## 2021-04-07 NOTE — PLAN OF CARE
Problem: Falls - Risk of:  Goal: Will remain free from falls  Description: Will remain free from falls  4/6/2021 2317 by Marylene Devoid, RN  Outcome: Ongoing  4/6/2021 1525 by Jaxon Damon RN  Outcome: Ongoing  Goal: Absence of physical injury  Description: Absence of physical injury  4/6/2021 2317 by Marylene Devoid, RN  Outcome: Ongoing  4/6/2021 1525 by Jaxon Damon RN  Outcome: Ongoing     Problem: Pain:  Goal: Pain level will decrease  Description: Pain level will decrease  4/6/2021 2317 by Marylene Devoid, RN  Outcome: Ongoing  4/6/2021 1525 by Jaxon Damon RN  Outcome: Ongoing  Goal: Control of acute pain  Description: Control of acute pain  4/6/2021 2317 by Marylene Devoid, RN  Outcome: Ongoing  4/6/2021 1525 by Jaxon Damon RN  Outcome: Ongoing  Goal: Control of chronic pain  Description: Control of chronic pain  4/6/2021 2317 by Marylene Devoid, RN  Outcome: Ongoing  4/6/2021 1525 by Jaxon Damon RN  Outcome: Ongoing     Problem: SAFETY  Goal: Free from accidental physical injury  4/6/2021 2317 by Marylene Devoid, RN  Outcome: Ongoing  4/6/2021 1525 by Jaxon Damon RN  Outcome: Ongoing  Goal: Free from intentional harm  4/6/2021 2317 by Marylene Devoid, RN  Outcome: Ongoing  4/6/2021 1525 by Jaxon Damon RN  Outcome: Ongoing  Goal: LTG - Patient will demonstrate safety requirements appropriate to situation/environment  4/6/2021 2317 by Marylene Devoid, RN  Outcome: Ongoing  4/6/2021 1525 by Jaxon Damon RN  Outcome: Ongoing  Goal: LTG - patient will utilize safety techniques  4/6/2021 2317 by Marylene Devoid, RN  Outcome: Ongoing  4/6/2021 1525 by Jaxon Damon RN  Outcome: Ongoing  Goal: STG - patient locks brakes on wheelchair  4/6/2021 2317 by Marylene Devoid, RN  Outcome: Ongoing  4/6/2021 1525 by Jaxon Damon RN  Outcome: Ongoing  Goal: STG - Patient uses call light consistently to request assistance with transfers  4/6/2021 2317 by Marylene Devoid, RN  Outcome: Ongoing  4/6/2021 1525 by Jaxon Damon RN  Outcome: Ongoing

## 2021-04-07 NOTE — PROGRESS NOTES
04/07/21 1400   Restrictions/Precautions   Restrictions/Precautions Fall Risk;Weight Bearing   Required Braces or Orthoses? 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 I&D PSOAS ABCESS   Pain Assessment   Pain Assessment 0-10   Pain Level 4   Patient's Stated Pain Goal No pain   Pain Type Chronic pain   Pain Location Chest   Pain Orientation Mid   Pain Descriptors Aching;Discomfort   Pain Frequency Continuous   Pain Onset Gradual   Clinical Progression Gradually improving   Functional Pain Assessment Activities are not prevented   Non-Pharmaceutical Pain Intervention(s) Distraction   Response to Pain Intervention None   Bed Mobility   Rolling Stand by assistance   Sit to Supine Stand by assistance;Supervision   Transfers   Stand Pivot Transfers Contact guard assistance   Other exercises   Other exercises 1 SEATED HIP ADD SQUEEZES 2X15    Other exercises 2 SEATED BLE KNEE EXT 2X15    Other exercises 3 SEATED HIP ABD 2X15    Other exercises 4 SEATED HAMSTRING CURLS X15   Conditions Requiring Skilled Therapeutic Intervention   Body structures, Functions, Activity limitations Decreased functional mobility ; Decreased ROM; Decreased strength;Decreased endurance;Decreased balance;Decreased fine motor control;Decreased coordination; Increased pain   Assessment PT STATED SHE WAS FEELING MORE FATIGUED THIS AFTERNOON. ABLE TO TOLERATE SEATED THERAPEUTIC EXERCISES WILL WITHOUT DIFFICULTY. WANTS TO ATTEMPT STAIR NEGOTIATION IN FUTURE SESSIONS.      Activity Tolerance   Activity Tolerance Patient Tolerated treatment well;Patient limited by fatigue   Safety Devices   Type of devices All fall risk precautions in place;Call light within reach; Left in bed;Bed alarm in place

## 2021-04-07 NOTE — PROGRESS NOTES
3 CGA AMB 50 FEET WITH AD   Short term goal 4 INDEP W/C PROPEL 50 FEET   Short term goal 5 CGA CAR TRANSFER   Long term goals   Time Frame for Long term goals  2 WEEKS   Long term goal 1 INDEP ON BED AND TRANSFERS   Long term goal 2 INDPE  FEET WITH AD   Long term goal 3 INDPE W/C PROPEL 150 FEET   Long term goal 4 INDEP CAR TRANSFER   Long term goal 5 INDEP 4 STEP    Conditions Requiring Skilled Therapeutic Intervention   Body structures, Functions, Activity limitations Decreased functional mobility ; Decreased ROM; Decreased strength;Decreased endurance;Decreased balance;Decreased fine motor control;Decreased coordination; Increased pain   Assessment Pt tolerates session well, does c/o chest pain from cx collar but no c/o increased pain with amb or ther ex. Able to tolerated increased amb distance well with rwx and cont to exhibit step-to pattern, steady overall with no LOB. Tolerates seated BLE ther ex well, held R hip abduction ex d/t R glueus medius wound vac. Pt would cont to benefit from skilled therapy services to address remaining strength and endurance deficits.    Activity Tolerance   Activity Tolerance Patient Tolerated treatment well   Safety Devices   Type of devices Bed alarm in place;Call light within reach;Gait belt;Left in bed  (left with wound care nurse)       Electronically signed by Sunshine Armstrong PTA on 4/7/2021 at 10:58 AM

## 2021-04-07 NOTE — PROGRESS NOTES
Patient:   Juan Reynolds  MR#:    [id number]   Room:    0820/820-01   YOB: 1955  Date of Progress Note: 4/7/2021  Time of Note                           8:17 AM  Consulting Physician:   Christina Galvan M.D. Attending Physician:  Christina Galvan MD     CHIEF COMPLAINT: Generalized weakness and deconditioning     Subjective  This 77 y.o. female  admitted to [facility] initially on 1/14/2021. The patient had been in her usual state of health when she developed a torn right gluteal muscle. She underwent surgical repair on 1/14/21 and post-operative course was progressing. At her 4 week follow up, she developed increased bloody drainage from the wound with erythema. She presented for injection due to RA at which time she suffered a syncopal event. This was felt to be a vasovagal response and she returned to home. She was found later by a family member in the floor for an unknown length of time and the dressing to the right hip/gluteal wound was saturated. She presented to the hospital 2/8 and was noted to be febrile on intake. Workup revealed an elevated CPK and d dimer. Wound cultures were obtained. The patient was admitted to the service of the hospitalists at that time. She underwent pacemaker interrogation in workup for her syncopal episodes which detected no abnormalities. MRI brain negative for acute process. Purulent drainage from the wound was cultured and the patient was started on broad spectrum antibiotics. She was seen in consultation by orthopedics. Blood cultures returned positive for MSSA in all bottles. ID was consulted for antibiotic management at that time. She underwent debridement of the wound per ortho on 2/9. Post-operatively, the wound measures 20 cm x 6 cm and wound vac therapy was initiated. Surveillance cultures remained positive and surgical wound cultures positive for e. Coli. She continued with antibiotic therapy with Azactam under direction of ID.  Due to persistent new c/o this am. Still with sternal soreness from C collar. REVIEW OF SYSTEMS:  Constitutional: No fevers No chills  Neck:No stiffness  Respiratory: No shortness of breath  Cardiovascular: No chest pain No palpitations  Gastrointestinal: No abdominal pain    Genitourinary: No Dysuria  Neurological: No headache, no confusion      PHYSICAL EXAM:  /76   Pulse 70   Temp 97.4 °F (36.3 °C) (Temporal)   Resp 16   Ht 5' 6\" (1.676 m)   Wt 198 lb 0.7 oz (89.8 kg)   SpO2 96%   BMI 31.97 kg/m²     Constitutional: she appears well-developed and well-nourished. Eyes  conjunctiva normal.  Pupils react to light  Ear, nose, throat -hearing intact to voice. No scars, masses, or lesions over external nose or ears, no atrophy of tongue  Neck-symmetric, no masses noted, no jugular vein distension  Respiration- chest wall appears symmetric, good expansion,   normal effort without use of accessory muscles  Cardiovascular- RRR  Musculoskeletal  no significant wasting of muscles noted, no bony deformities, gait no gross ataxia  Extremities-no clubbing, cyanosis or edema  Skin  warm, dry, and intact. No rash, erythema, or pallor. Psychiatric  mood, affect, and behavior appear normal.      Neurology  NEUROLOGICAL EXAM:  Awake, alert, fluent oriented x 3 appropriate affect  Attention and concentration appear appropriate  Recent and remote memory appears unremarkable  Speech normal without dysarthria  No clear issues with language of fund of knowledge     Cranial Nerve Exam   CN II- Visual fields grossly unremarkable  CN III, IV,VI-EOMI, No nystagmus, conjugate eye movements, no ptosis  CN VII-no facial assymetry  CN VIII-Hearing intact        Motor Exam  RA deformities noted in the hands. There is mild generalized weakness and right hip weakness which is partly antalgic.           Tremors- no tremors in hands or head noted     Gait  Not tested      Nursing/pcp notes, imaging,labs and vitals reviewed.      PT,OT and/or speech notes reviewed    Lab Results   Component Value Date    WBC 6.8 04/05/2021    HGB 9.2 (L) 04/05/2021    HCT 31.5 (L) 04/05/2021    MCV 97.2 04/05/2021     04/05/2021     Lab Results   Component Value Date     04/05/2021    K 4.3 04/05/2021    CL 97 (L) 04/05/2021    CO2 34 (H) 04/05/2021    BUN 20 04/05/2021    CREATININE 0.4 (L) 04/05/2021    GLUCOSE 82 04/05/2021    CALCIUM 8.7 (L) 04/05/2021    PROT 5.4 (L) 03/29/2021    LABALBU 2.5 (L) 03/29/2021    BILITOT 0.5 03/29/2021    ALKPHOS 102 03/29/2021    AST 11 03/29/2021    ALT <5 (A) 03/29/2021    LABGLOM >60 04/05/2021    GFRAA >59 04/05/2021     Lab Results   Component Value Date    INR 1.20 (H) 12/05/2018    INR 1.14 09/04/2018    INR 1.08 07/10/2018     Lab Results   Component Value Date    CRP 13.94 (H) 03/29/2021     PHYSICAL THERAPY  STRENGTH  Strength RLE  Comment: 2-/5 HIP, 3/5 KNEE, 3+/5 ANKLE  ROM  AROM RLE (degrees)  RLE General AROM: DECREASED AT HIP  AROM LLE (degrees)  LLE AROM : WFL  BED MOBILITY  Bed Mobility  Rolling: Stand by assistance  Supine to Sit: Stand by assistance  Sit to Supine: Stand by assistance, Minimal assistance(Assist with Aubrey LE into bed)  Scooting: Contact guard assistance  Comment: log roll  TRANSFERS  Transfers  Sit to Stand: Contact guard assistance  Stand to sit: Contact guard assistance  Bed to Chair: Contact guard assistance(with RW from W/C to bed)  Stand Pivot Transfers: Contact guard assistance(RW)  Lateral Transfers: Minimal Assistance, Contact guard assistance(W/C to Recliner using RW)  Comment: LUE on w/c and RUE on walker going sit to stand.    WHEELCHAIR PROPULSION  Propulsion 1  Propulsion: Manual  Level: Level Tile  Method: RUBINA FITZPATRICK  Level of Assistance: Stand by assistance, Contact guard assistance  Description/ Details: 2 TURNS  Distance: 50  AMBULATION  Ambulation 1  Surface: level tile  Device: Rolling Walker  Other Apparatus: Wheelchair follow  Assistance: Contact guard assistance meals         SHORT TERM GOAL #1:  Goal 1: The patient will provide 15 members in a given category with 90% accuracy and (min)cues in the form of a visual aid, semantic/phonemic cueing, etc.     SHORT TERM GOAL #2:  Goal 2: The patient will demonstrate functional problem solving and safety awareness with 90% verbal,tactile and visual cues for daily living tasks in order to increase safe interaction with environmentand decreased assistance from caregivers     SHORT TERM GOAL #3:  Goal 3: The patient will demonstrate recall of functional information following a/an (immediate, shortterm,long-term) delay with min cues in order to increase functional integration into environment.     SHORT TERM GOAL #4:  Goal 4: The patient will recall 8 facts from (sentence, paragraph, several paragraph, page) length material with 90% accuracy and min cueing.     SHORT TERM GOAL #5:     Swallowing Short Term Goals  Short-term Goals  Goal 1: The patient will tolerate a dysphagia soft and bite sized diet with thin liquids with minimal overt s/s of aspiration. Goal 2: Staff/caregivers will complete daily oral hygiene to prevent aspiration of bacteria laden secretions. Goal 3: The patient will participate in a modified barium swallow study to futher assess swallow function. met     Comprehension: 4 - Patient understands basic needs 75-90%+ of the time  Expression: 4 - Expresses basic ideas/needs 75-90%+ of the time  Social Interaction: 4 - Patient appropriate 75-90%+ of the time  Problem Solving: 3 - Patient solves simple/routine tasks 50%-74%  Memory: 3 - Patient remembers 50%-74% of the time        OCCUPATIONAL THERAPY     CURRENT IRF-CAT SCORES  Eating: CARE Score: 4     Oral Hygiene: CARE Score: 5         Toileting: CARE Score: 4  Comment: CGA      Shower/Bathe: CARE Score: 3  Comment: Mod A, shower        Upper Body Dressing: CARE Score: 2  Comment:  Mod A for pullover shirt, Max A with c-collar       Lower Body Dressing: CARE Score: 2  Comment: Will need to assess AE        Footwear: CARE Score: 2  Comment: VC/supervision with donning/doffing socks using AE, Max A with shoes, attempted shoe horn, but unable.        Toilet Transfers: CARE Score: 4  Comment: CGA, w/c<>toilet using GB        Picking Up Object:  CARE Score: 88           UE Functioning:  B UE WFLs     Pain Assessment:  Pain Level: 8  Pain Location: Back     STGs:  Short term goals  Time Frame for Short term goals: 1 week  Short term goal 1: MET  Short term goal 2: pt will complete LB dressing with  min A  Short term goal 3: pt will complete overall bathing with min A  Short term goal 4: pt will complete simple ambulatory home making task with min A  Short term goal 5: pt will don/doff back brace with independence  Short term goal 6: pt will complete 1-2 handed standing task for 3 mins with CGA  Short term goal 7: pt will complete R FM/sensory acts to improve function of hand during ADL task  Short term goal 8: Pt will increase B  strength by 2#. Short term goal 9: Pt will decrease B 9 hole peg test time by 2 seconds.     LTGs:  Long term goals  Time Frame for Long term goals : 2 weeks  Long term goal 1: complete overall dressing with supervision  Long term goal 2: complete overall bathing with supervision  Long term goal 3: complete overall toileting with supervision  Long term goal 4: complete simple ambulatory home making task with supervision  Long term goal 5: complete HEP with independence     Assessment:  Performance deficits / Impairments: Decreased high-level IADLs, Decreased functional mobility , Decreased endurance, Decreased ADL status, Decreased balance, Decreased strength, Decreased coordination, Decreased sensation                        NUTRITION  Current Wt: Weight: 198 lb 0.7 oz (89.8 kg) / Body mass index is 31.97 kg/m².   Admission Wt: Admission Body Weight: 205 lb (93 kg)  Oral Diet Orders: General; Dental Soft  Oral Nutrition Supplement (ONS) Jayy Vargas Enlive BID; Ramiro BID     Pt continues to improve nutritionally AEB adequate po intakes (>50% most meals) and stable wt since previous assessment. Pt reports drinking 100% ONS (Ramiro and Ensures). Encouraged continued good po intake and high protein intake for wound healing. Please see nutrition notes for further details.        RECORD REVIEW: Previous medical records, medications were reviewed at today's visit    IMPRESSION:   1. Right psoas abscess and L1/2 discitis/osteomyelitis MSSA with generalized weakness and deconditioning. Continue Ancef for another 8 to 12 weeks most likely. PT/OT for generalized weakness and deconditioning. Continue wound VAC for right gluteal tear/repair  2. GIcontinue with medications for constipation. reglan   3. DVT prophylaxissubcu heparin  4. Rheumatoid arthritislow-dose prednisone  5. Coronary artery diseasecontinue Imdur  6. Hypomagnesemia/hypokalemiareplacement ordered  Appreciate ID assistance  Added 300 mg gabapentin at bedtime to help her get through the night  Prednisone increased for a few days and then will reduce to 5 mg a day.   Lidoderm patch for costochondritis    Staffing this date , mutlidisciplinary with entire team with complex decision making and planning for discharge  ELOS:4/12, Monday , snf anticipated

## 2021-04-07 NOTE — PROGRESS NOTES
Occupational Therapy  Facility/Department: [facility] 8 REHAB UNIT  Daily Treatment Note  NAME: Carmita Otoole  : 1955  MRN: [id number]    Date of Service: 2021    Discharge Recommendations:  Continue to assess pending progress       Assessment   Performance deficits / Impairments: Decreased high-level IADLs;Decreased functional mobility ; Decreased endurance;Decreased ADL status; Decreased balance;Decreased strength;Decreased coordination;Decreased sensation  Treatment Diagnosis: R Gluteal abscess, recent cervical decompression, MSSA on long-term antibiotics due to recent discitis,, L1-2 discectomy  OT Education: Home Exercise Program;Precautions; ADL Adaptive Strategies;Transfer Training  Activity Tolerance  Activity Tolerance: Patient Tolerated treatment well  Safety Devices  Safety Devices in place: Yes(left with PT)  Type of devices: Left in chair         Patient Diagnosis(es): There were no encounter diagnoses. has a past medical history of CAD (coronary artery disease), Cellulitis, History of blood transfusion, History of DVT (deep vein thrombosis), History of neutropenia, History of pulmonary fibrosis, Hx of blood clots, Hypercholesterolemia, Hypertension, Intrinsic asthma, Pacemaker, Post op infection, Rheumatoid arteritis ([address] Sinus node dysfunction ([address] Staph aureus infection, Status post placement of implantable loop recorder, Syncope, and Thyroid disease. has a past surgical history that includes Diagnostic Cardiac Cath Lab Procedure; LEEP; Coronary angioplasty with stent (); Pacemaker insertion (2016); back surgery (2018); pr total knee arthroplasty (Right, 2018); pr incis/drain thigh/knee abscess,deep (Right, 10/23/2018); Cardiac catheterization (14  MDL); Cardiac catheterization (1/26/15  MDL); pacemaker placement; joint replacement; and Revision total knee arthroplasty (Right, 2018).     Restrictions  Restrictions/Precautions  Restrictions/Precautions: Fall Risk, Weight Bearing  Required Braces or Orthoses?: Yes  Lower Extremity Weight Bearing Restrictions  Right Lower Extremity Weight Bearing: Weight Bearing As Tolerated  Left Lower Extremity Weight Bearing: Weight Bearing As Tolerated  Required Braces or Orthoses  Cervical: c-collar  Spinal: Lumbar Corset  Spinal Other: LSO  Position Activity Restriction  Spinal Precautions: No Bending, No Lifting, No Twisting  Objective    Balance  Standing Balance: Stand by assistance  Functional Mobility  Functional - Mobility Device: Rolling Walker  Activity: Other  Assist Level: Contact guard assistance     Transfers  Sit to stand: Stand by assistance  Stand to sit: Stand by assistance            Plan   Plan  Times per week: 5  Times per day: Twice a day  Current Treatment Recommendations: Self-Care / ADL, Safety Education & Training, Pain Management, Endurance Training, Functional Mobility Training, Strengthening, Patient/Caregiver Education & Training, Home Management Training, Equipment Evaluation, Education, & procurement, Balance Training, Positioning       OutComes Score       04/07/21 1300   Eating   Assistance Needed Independent   CARE Score 6   Oral Hygiene   Assistance Needed Independent   CARE Score 6   Upper Body Dressing   Assistance Needed Supervision or touching assistance   Comment Donning/doffing c-collar in front of mirror with VC, changed out pads and washed them with VC for orientation of pads on c-collar, Setup for LSO.    CARE Score 4         Goals  Short term goals  Time Frame for Short term goals: 1 week  Short term goal 1: MET  Short term goal 2: pt will complete LB dressing with  min A  Short term goal 3: pt will complete overall bathing with min A  Short term goal 4: pt will complete simple ambulatory home making task with min A  Short term goal 5: pt will don/doff back brace with independence  Short term goal 6: pt will complete 1-2 handed standing task for 3 mins with CGA  Short term goal 7: pt will

## 2021-04-07 NOTE — PROGRESS NOTES
Speech Therapy  [facility] 8 REHAB UNIT  TIME   Time In: 09:00  Time Out: 10:00  Minutes: 60     [x]? Daily Note  []? 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 I&D PSOAS ABCESS  Additional Pertinent Hx: RECENT HX OF CX SX, BACTEREMIA    RESTRICTIONS/PRECAUTIONS:    Restrictions/Precautions: Fall Risk, Weight Bearing  Required Braces or Orthoses?: Yes  Position Activity Restriction  Spinal Precautions: No Bending, No Lifting, No Twisting     Subjective:   Patient was considered alert, in bed, upon entry. Patient participated during all therapeutic activities.     Objective:  Targeted reasoning/problem solving. In structured activity, patient was 100% verbalizing 3 items needed to perform basic activities of daily living at independent level. In structured activity, patient was 100% verbalizing initial steps in performing basic activizes of daily living independently. In structured activity, patient was 100% verbalizing 2 appropriate solutions to situations that could occur during activities of daily living at independent level. Targeted thought organization. Patient averaged 14 items per concrete divergent category with 1 minute time constraints present. Also monitored patient's swallowing function. With soft and bite sized consistency presentations administered independently, patient exhibited decreased rotary jaw movement during oral prep. Oral transit of soft and bite sized primarily measured 1-2 seconds in length and min oral cavity residue was noted post swallows; residue cleared from the mouth with additional dry swallows.  Oral transit of thin H2O presentations, administered independently via straw, primarily measured 1-2 seconds in length. Laryngeal elevation during swallow initiation was considered to be sluggish and mild-moderately decreased for swallow airway protection. Even so, no outward S/S penetration/aspiration was observed with any soft and bite sized consistency presentation or thin H2O presentation administered during treatment session. At this time, would recommend continuation current diet. Meds whole in pudding/applesauce.      Recommended Diet:  Solid consistency: Soft and bite sized  Liquid consistency: Thin  Liquid administration via: Cup;Straw  Medication administration: (Meds whole in pudding/applesauce)     Safe Swallow Protocol:  Compensatory Swallowing Strategies: Alternate solids and liquids;Upright as possible for all oral intake;Remain upright for 30-45 minutes after meals      SHORT TERM GOAL #1:  Goal 1: The patient will provide 15 members in a given category with 90% accuracy and (min)cues in the form of a visual aid, semantic/phonemic cueing, etc.     SHORT TERM GOAL #2:  Goal 2: The patient will demonstrate functional problem solving and safety awareness with 90% verbal,tactile and visual cues for daily living tasks in order to increase safe interaction with environment and decreased assistance from caregivers     SHORT TERM GOAL #3:  Goal 3: The patient will demonstrate recall of functional information following a/an (immediate, short term,long-term) delay with min cues in order to increase functional integration into environment.     SHORT TERM GOAL #4:  Goal 4: The patient will recall 8 facts from (sentence, paragraph, several paragraph, page) length material with 90% accuracy and min cueing.     SHORT TERM GOAL #5:  Swallowing Short Term Goals  Short-term Goals  Goal 1: The patient will tolerate a dysphagia soft and bite sized diet with thin liquids with minimal overt s/s of aspiration.   Goal 2: Staff/caregivers will complete daily oral hygiene to prevent aspiration of bacteria laden secretions. Goal 3: The patient will participate in a modified barium swallow study to futher assess swallow function. met     ASSESSMENT:  Assessment: [x]? Progressing towards goals           []? Not Progressing towards goals     Patient Tolerance of Treatment:       [x]? Tolerated well          []? Tolerated fair           []? Required rest breaks          []? Fatigued     Education:  Learner:  [x]? Patient          []? Significant Other          []? Other  Education provided regarding:  [x]? Goals and POC                               []?Diet and swallowing precautions                  []?Home Exercise Program                 []?Progress and/or discharge information  Method of Education:  [x]? Discussion          []? Demonstration          []? Handout          []? Other  Evaluation of Education:   []? Verbalized understanding                           []?Demonstrates without assistance  []? Demonstrates with assistance                    [x]? Needs further instruction                               []?No evidence of learning                                [x]? No family present                    Plan:   [x]? Continue with current plan of care                []?Modify current plan of care as follows:               []?Discharge patient                          Discharge Location:                          Services/Supervision Recommended:       [x]? Patient continues to require treatment by a licensed therapist to address functional deficits as outlined in the established plan of care.     Electronically signed by LAURI Diaz on 4/7/2021 at 1:59 PM

## 2021-04-07 NOTE — PROGRESS NOTES
Wound vac dressing changed. All foam removed from wound. Wound and periwound skin cleaned with soap and water. Wound bed rinsed with NS. Wound appearance remains largely unchanged from last assessment, red with granulation. Exudate in cannister is clearer today, yellow serous. Skin prep applied to periwound. Periwound window-paned with vac drape. Black foam to wound bed. All sealed with drape and negative 125 mmHg applied. No alarms or leaks noted on wound vac. Patient tolerated well without complaints or expressions of discomfort.

## 2021-04-08 LAB
ANION GAP SERPL CALCULATED.3IONS-SCNC: 7 MMOL/L (ref 7–19)
BASOPHILS ABSOLUTE: 0 K/UL (ref 0–0.2)
BASOPHILS RELATIVE PERCENT: 0.4 % (ref 0–1)
BUN BLDV-MCNC: 13 MG/DL (ref 8–23)
C-REACTIVE PROTEIN: 3.19 MG/DL (ref 0–0.5)
CALCIUM SERPL-MCNC: 8.9 MG/DL (ref 8.8–10.2)
CHLORIDE BLD-SCNC: 98 MMOL/L (ref 98–111)
CO2: 33 MMOL/L (ref 22–29)
CREAT SERPL-MCNC: 0.5 MG/DL (ref 0.5–0.9)
EOSINOPHILS ABSOLUTE: 0.2 K/UL (ref 0–0.6)
EOSINOPHILS RELATIVE PERCENT: 2.3 % (ref 0–5)
GFR AFRICAN AMERICAN: >59
GFR NON-AFRICAN AMERICAN: >60
GLUCOSE BLD-MCNC: 84 MG/DL (ref 74–109)
HCT VFR BLD CALC: 32.3 % (ref 37–47)
HEMOGLOBIN: 9.5 G/DL (ref 12–16)
IMMATURE GRANULOCYTES #: 0.2 K/UL
LYMPHOCYTES ABSOLUTE: 2.2 K/UL (ref 1.1–4.5)
LYMPHOCYTES RELATIVE PERCENT: 28.9 % (ref 20–40)
MCH RBC QN AUTO: 27.9 PG (ref 27–31)
MCHC RBC AUTO-ENTMCNC: 29.4 G/DL (ref 33–37)
MCV RBC AUTO: 95 FL (ref 81–99)
MONOCYTES ABSOLUTE: 1 K/UL (ref 0–0.9)
MONOCYTES RELATIVE PERCENT: 12.9 % (ref 0–10)
NEUTROPHILS ABSOLUTE: 4.1 K/UL (ref 1.5–7.5)
NEUTROPHILS RELATIVE PERCENT: 52.8 % (ref 50–65)
PDW BLD-RTO: 17.7 % (ref 11.5–14.5)
PLATELET # BLD: 228 K/UL (ref 130–400)
PMV BLD AUTO: 9.8 FL (ref 9.4–12.3)
POTASSIUM REFLEX MAGNESIUM: 4.6 MMOL/L (ref 3.5–5)
RBC # BLD: 3.4 M/UL (ref 4.2–5.4)
SODIUM BLD-SCNC: 138 MMOL/L (ref 136–145)
WBC # BLD: 7.8 K/UL (ref 4.8–10.8)

## 2021-04-08 PROCEDURE — 36415 COLL VENOUS BLD VENIPUNCTURE: CPT

## 2021-04-08 PROCEDURE — 1180000000 HC REHAB R&B

## 2021-04-08 PROCEDURE — 86140 C-REACTIVE PROTEIN: CPT

## 2021-04-08 PROCEDURE — 6370000000 HC RX 637 (ALT 250 FOR IP): Performed by: PSYCHIATRY & NEUROLOGY

## 2021-04-08 PROCEDURE — 2580000003 HC RX 258: Performed by: PSYCHIATRY & NEUROLOGY

## 2021-04-08 PROCEDURE — 99232 SBSQ HOSP IP/OBS MODERATE 35: CPT | Performed by: PSYCHIATRY & NEUROLOGY

## 2021-04-08 PROCEDURE — 85025 COMPLETE CBC W/AUTO DIFF WBC: CPT

## 2021-04-08 PROCEDURE — 97130 THER IVNTJ EA ADDL 15 MIN: CPT

## 2021-04-08 PROCEDURE — 97535 SELF CARE MNGMENT TRAINING: CPT

## 2021-04-08 PROCEDURE — 6360000002 HC RX W HCPCS: Performed by: PSYCHIATRY & NEUROLOGY

## 2021-04-08 PROCEDURE — 97110 THERAPEUTIC EXERCISES: CPT

## 2021-04-08 PROCEDURE — 97129 THER IVNTJ 1ST 15 MIN: CPT

## 2021-04-08 PROCEDURE — 80048 BASIC METABOLIC PNL TOTAL CA: CPT

## 2021-04-08 RX ADMIN — TRAMADOL HYDROCHLORIDE 50 MG: 50 TABLET, FILM COATED ORAL at 14:02

## 2021-04-08 RX ADMIN — DOCUSATE SODIUM 100 MG: 100 CAPSULE, LIQUID FILLED ORAL at 07:58

## 2021-04-08 RX ADMIN — METOCLOPRAMIDE 5 MG: 5 TABLET ORAL at 15:39

## 2021-04-08 RX ADMIN — LEVOTHYROXINE SODIUM 75 MCG: 75 TABLET ORAL at 06:30

## 2021-04-08 RX ADMIN — METOCLOPRAMIDE 5 MG: 5 TABLET ORAL at 12:06

## 2021-04-08 RX ADMIN — HEPARIN SODIUM 5000 UNITS: 5000 INJECTION INTRAVENOUS; SUBCUTANEOUS at 20:49

## 2021-04-08 RX ADMIN — NALOXEGOL OXALATE 25 MG: 12.5 TABLET, FILM COATED ORAL at 07:56

## 2021-04-08 RX ADMIN — CARVEDILOL 25 MG: 25 TABLET, FILM COATED ORAL at 17:01

## 2021-04-08 RX ADMIN — GABAPENTIN 300 MG: 300 CAPSULE ORAL at 20:48

## 2021-04-08 RX ADMIN — GUAIFENESIN 1200 MG: 600 TABLET, EXTENDED RELEASE ORAL at 20:48

## 2021-04-08 RX ADMIN — TRAMADOL HYDROCHLORIDE 50 MG: 50 TABLET, FILM COATED ORAL at 20:49

## 2021-04-08 RX ADMIN — OXYCODONE 15 MG: 5 TABLET ORAL at 12:06

## 2021-04-08 RX ADMIN — OXYCODONE 15 MG: 5 TABLET ORAL at 20:48

## 2021-04-08 RX ADMIN — GUAIFENESIN 1200 MG: 600 TABLET, EXTENDED RELEASE ORAL at 07:56

## 2021-04-08 RX ADMIN — DOCUSATE SODIUM 100 MG: 100 CAPSULE, LIQUID FILLED ORAL at 20:48

## 2021-04-08 RX ADMIN — PANTOPRAZOLE SODIUM 40 MG: 40 TABLET, DELAYED RELEASE ORAL at 08:11

## 2021-04-08 RX ADMIN — HEPARIN SODIUM 5000 UNITS: 5000 INJECTION INTRAVENOUS; SUBCUTANEOUS at 08:03

## 2021-04-08 RX ADMIN — Medication 2000 MG: at 00:13

## 2021-04-08 RX ADMIN — ACETAMINOPHEN 650 MG: 325 TABLET ORAL at 06:30

## 2021-04-08 RX ADMIN — GABAPENTIN 100 MG: 100 CAPSULE ORAL at 20:49

## 2021-04-08 RX ADMIN — METOCLOPRAMIDE 5 MG: 5 TABLET ORAL at 06:30

## 2021-04-08 RX ADMIN — FUROSEMIDE 40 MG: 40 TABLET ORAL at 07:58

## 2021-04-08 RX ADMIN — PREDNISONE 10 MG: 5 TABLET ORAL at 07:58

## 2021-04-08 RX ADMIN — OXYCODONE 15 MG: 5 TABLET ORAL at 17:01

## 2021-04-08 RX ADMIN — OXYCODONE 15 MG: 5 TABLET ORAL at 07:57

## 2021-04-08 RX ADMIN — Medication 2000 MG: at 07:53

## 2021-04-08 RX ADMIN — CARVEDILOL 25 MG: 25 TABLET, FILM COATED ORAL at 07:59

## 2021-04-08 RX ADMIN — SODIUM CHLORIDE, PRESERVATIVE FREE 10 ML: 5 INJECTION INTRAVENOUS at 07:52

## 2021-04-08 RX ADMIN — POTASSIUM CHLORIDE 20 MEQ: 1500 TABLET, EXTENDED RELEASE ORAL at 07:58

## 2021-04-08 RX ADMIN — Medication 1 TABLET: at 07:57

## 2021-04-08 RX ADMIN — Medication 2000 MG: at 15:39

## 2021-04-08 RX ADMIN — ACYCLOVIR 400 MG: 200 CAPSULE ORAL at 07:58

## 2021-04-08 RX ADMIN — ISOSORBIDE MONONITRATE 60 MG: 60 TABLET, EXTENDED RELEASE ORAL at 07:59

## 2021-04-08 RX ADMIN — TRAMADOL HYDROCHLORIDE 50 MG: 50 TABLET, FILM COATED ORAL at 07:58

## 2021-04-08 RX ADMIN — ISOSORBIDE MONONITRATE 60 MG: 60 TABLET, EXTENDED RELEASE ORAL at 20:48

## 2021-04-08 RX ADMIN — ACYCLOVIR 400 MG: 200 CAPSULE ORAL at 20:48

## 2021-04-08 RX ADMIN — POLYETHYLENE GLYCOL 3350 17 G: 17 POWDER, FOR SOLUTION ORAL at 07:59

## 2021-04-08 RX ADMIN — MAGNESIUM OXIDE TAB 400 MG (240 MG ELEMENTAL MG) 400 MG: 400 (240 MG) TAB at 07:58

## 2021-04-08 RX ADMIN — GABAPENTIN 100 MG: 100 CAPSULE ORAL at 07:57

## 2021-04-08 ASSESSMENT — PAIN SCALES - GENERAL
PAINLEVEL_OUTOF10: 4
PAINLEVEL_OUTOF10: 2
PAINLEVEL_OUTOF10: 4
PAINLEVEL_OUTOF10: 6
PAINLEVEL_OUTOF10: 2
PAINLEVEL_OUTOF10: 2
PAINLEVEL_OUTOF10: 4

## 2021-04-08 ASSESSMENT — PAIN DESCRIPTION - ORIENTATION: ORIENTATION: LOWER

## 2021-04-08 ASSESSMENT — PAIN DESCRIPTION - LOCATION: LOCATION: BACK

## 2021-04-08 ASSESSMENT — PAIN DESCRIPTION - FREQUENCY: FREQUENCY: INTERMITTENT

## 2021-04-08 ASSESSMENT — PAIN DESCRIPTION - ONSET: ONSET: GRADUAL

## 2021-04-08 ASSESSMENT — PAIN DESCRIPTION - DESCRIPTORS: DESCRIPTORS: ACHING

## 2021-04-08 NOTE — PROGRESS NOTES
04/08/21 1100   Restrictions/Precautions   Restrictions/Precautions Fall Risk;Weight Bearing   Required Braces or Orthoses? Yes   Lower Extremity Weight Bearing Restrictions   Right Lower Extremity Weight Bearing Weight Bearing As Tolerated   Left Lower Extremity Weight Bearing Weight Bearing As Tolerated   Required Braces or Orthoses   Cervical c-collar   Spinal Lumbar Corset   Spinal Other LSO   Position Activity Restriction   Spinal Precautions No Bending; No Lifting; No Twisting   Pain Screening   Patient Currently in Pain Yes   Pain Assessment   Pain Assessment 0-10   Pain Level 6   Pain Location Hip;Back  (Bilat. hips)   Non-Pharmaceutical Pain Intervention(s) Rest   Response to Pain Intervention Patient Satisfied   Transfers   Sit to Stand Contact guard assistance   Stand to sit Contact guard assistance   Comment Rotates trunk right going sit to stand due to R hip pain. Exercises   Comments Standing weight shifts in parallel bars 2x10; standing marches 1x5. Cuing to lock RLE. LAQ with end range 3 s/h (AAROM with RLE) 2x10; hip ABD isometrics with 3 s/h 1x10. Conditions Requiring Skilled Therapeutic Intervention   Assessment Pt. CGA with transfers. Activity Tolerance   Activity Tolerance Patient Tolerated treatment well;Patient limited by fatigue   Safety Devices   Type of devices All fall risk precautions in place;Call light within reach; Chair alarm in place; Patient at risk for falls; Left in chair

## 2021-04-08 NOTE — PROGRESS NOTES
[facility]  [facility]PAT[facility]ECH [facility]ERAPY  [facility] 8 REHAB UNIT  TIME   Time In: 1400  Time Out: 1500  Minutes: 60  Total Treatment Time: 60    [x]Daily Note  []Progress Note    Date: 2021  Patient Name: Kandy Mazariegos        MRN: [id number]    Account #: [de-identified]  : 1955  (76 y.o.)  Gender: female   Primary Provider: Kalen Belle MD  Precautions: neck/back brace; fall  Swallowing Status/Diet: general-dental soft; thin liquids      Subjective:   Pt sitting up-right in wheelchair, 'cleaning room and getting ready for d/c.\"    Objective:    Divergent naming task completed with 90% accuracy within 60-90 seconds. Functional problem solving and verbal reasoning tasks completed with open-ended 'wh' questions. Pt identified 1-2 scenarios for home environment problems independently, increasing to 3-4 with verbal prompts. Solutions provided with 90% accuracy given minimal verbal cues. Short-term recall targeted with 5 minute delays and background noise/distraction. Pt recalled items with 90% accuracy, requiring verbal prompt intermittently primarily for word finding. Pt had 3x difficulty with word recall. Review of ST program and goals reviewed. Pt verbalized happiness with therapy program.     SHORT TERM GOAL #1:  Goal 1: The patient will provide 15 members in a given category with 90% accuracy and (min)cues in the form of a visual aid, semantic/phonemic cueing, etc.    SHORT TERM GOAL #2:  Goal 2: The patient will demonstrate functional problem solving and safety awareness with 90% verbal,tactile and visual cues for daily living tasks in order to increase safe interaction with environmentand decreased assistance from caregivers    SHORT TERM GOAL #3:  Goal 3: The patient will demonstrate recall of functional information following a/an (immediate, shortterm,long-term) delay with min cues in order to increase functional integration into environment.     SHORT TERM GOAL #4:  Goal 4: The patient will recall 8 SHAYNA Mack,CCC- SLP on 4/8/21 at 3:09 PM CDT

## 2021-04-08 NOTE — PLAN OF CARE
Nutrition Problem #1: Inadequate oral intake  Intervention: Food and/or Nutrient Delivery: Continue Current Diet, Continue Oral Nutrition Supplement  Nutritional Goals: PO intake >50%, wt stable    Problem: NUTRITION  Goal: Patient/Family demonstrates understanding of diet  4/8/2021 1138 by Lise Severin, RN  Outcome: Ongoing

## 2021-04-08 NOTE — PROGRESS NOTES
Occupational Therapy  Facility/Department: [facility] 8 REHAB UNIT  Daily Treatment Note  NAME: Marek Dominguez  : 1955  MRN: [id number]    Date of Service: 2021    Discharge Recommendations:  Continue to assess pending progress       Assessment   Performance deficits / Impairments: Decreased high-level IADLs;Decreased functional mobility ; Decreased endurance;Decreased ADL status; Decreased balance;Decreased strength;Decreased coordination;Decreased sensation  Treatment Diagnosis: R Gluteal abscess, recent cervical decompression, MSSA on long-term antibiotics due to recent discitis,, L1-2 discectomy  Activity Tolerance  Activity Tolerance: Patient Tolerated treatment well  Safety Devices  Safety Devices in place: Yes(Left with PT)  Type of devices: Left in chair         Patient Diagnosis(es): There were no encounter diagnoses. has a past medical history of CAD (coronary artery disease), Cellulitis, History of blood transfusion, History of DVT (deep vein thrombosis), History of neutropenia, History of pulmonary fibrosis, Hx of blood clots, Hypercholesterolemia, Hypertension, Intrinsic asthma, Pacemaker, Post op infection, Rheumatoid arteritis ([address] Sinus node dysfunction ([address] Staph aureus infection, Status post placement of implantable loop recorder, Syncope, and Thyroid disease. has a past surgical history that includes Diagnostic Cardiac Cath Lab Procedure; LEEP; Coronary angioplasty with stent (); Pacemaker insertion (2016); back surgery (2018); pr total knee arthroplasty (Right, 2018); pr incis/drain thigh/knee abscess,deep (Right, 10/23/2018); Cardiac catheterization (14  MDL); Cardiac catheterization (1/26/15  MDL); pacemaker placement; joint replacement; and Revision total knee arthroplasty (Right, 2018).     Restrictions  Restrictions/Precautions  Restrictions/Precautions: Fall Risk, Weight Bearing  Required Braces or Orthoses?: Yes  Lower Extremity Weight Bearing Restrictions  Right Lower Extremity Weight Bearing: Weight Bearing As Tolerated  Left Lower Extremity Weight Bearing: Weight Bearing As Tolerated  Required Braces or Orthoses  Cervical: c-collar  Spinal: Lumbar Corset  Spinal Other: LSO  Position Activity Restriction  Spinal Precautions: No Bending, No Lifting, No Twisting       Objective       Balance  Sitting Balance: Independent  Standing Balance: Supervision  Functional Mobility  Functional - Mobility Device: Rolling Walker  Activity: To/from bathroom; Retrieve items;Transport items  Assist Level: Stand by assistance  Functional Mobility Comments: assistnace with managing wound vac     Transfers  Stand Step Transfers: Supervision  Sit to stand: Supervision  Stand to sit: Supervision           Plan   Plan  Times per week: 5  Times per day: Twice a day  Current Treatment Recommendations: Self-Care / ADL, Safety Education & Training, Pain Management, Endurance Training, Functional Mobility Training, Strengthening, Patient/Caregiver Education & Training, Home Management Training, Equipment Evaluation, Education, & procurement, Balance Training, Positioning       OutComes Score    Eating  Assistance Needed: Independent  CARE Score: 6  Discharge Goal: Independent    Oral Hygiene  Assistance Needed: Independent  CARE Score: 6  Discharge Goal: Independent     Toileting Hygiene  Assistance Needed: Supervision or touching assistance  Comment: Supervision, toilet aide for bowel hygiene  CARE Score: 4  Discharge Goal: Independent     Shower/Bathe Self  Assistance Needed: Supervision or touching assistance  Comment: Supervision with AE, shower  CARE Score: 4  Discharge Goal: Independent     Upper Body Dressing  Assistance Needed: Independent  Comment: with C-collar and pullover shirt  CARE Score: 6  Discharge Goal: Independent     Lower Body Dressing  Assistance Needed: Supervision or touching assistance  Comment: with AE, supervision  CARE Score: 4  Discharge Goal: Independent     Putting On/Taking Off Footwear  Assistance Needed: Supervision or touching assistance  Comment: Supervision with socks and shoes using AE, increased time  CARE Score: 4  Discharge Goal: Independent     Toilet Transfer  Assistance Needed: Supervision or touching assistance  Comment: Supervision  CARE Score: 4  Discharge Goal: Independent                         Goals  Short term goals  Time Frame for Short term goals: 1 week  Short term goal 1: MET  Short term goal 2: MET  Short term goal 3: MEt  Short term goal 4: pt will complete simple ambulatory home making task with min A  Short term goal 5: MET  Short term goal 6: MET  Short term goal 7: pt will complete R FM/sensory acts to improve function of hand during ADL task  Short term goal 8: Pt will increase B  strength by 2#. Short term goal 9: Pt will decrease B 9 hole peg test time by 2 seconds.   Long term goals  Time Frame for Long term goals : 2 weeks  Long term goal 1: MET  Long term goal 2: MET  Long term goal 3: MET  Long term goal 4: complete simple ambulatory home making task with supervision  Long term goal 5: complete HEP with independence       Therapy Time   Individual Concurrent Group Co-treatment   Time In 1000         Time Out 1100         Minutes 60         Timed Code Treatment Minutes: 75 [facility] OT

## 2021-04-08 NOTE — PROGRESS NOTES
Nutrition Assessment     Type and Reason for Visit: Reassess    Nutrition Recommendations/Plan: Continue current POC. Nutrition Assessment:  Pt continues with adequate po intake. Consuming > 50% of meals and 100% jared/ Ensure supplements. Continue current POC. Malnutrition Assessment:  Malnutrition Status:  At risk for malnutrition (Comment)    Current Nutrition Therapies:    Dietary Nutrition Supplements: Standard High Calorie Oral Supplement, Wound Healing Oral Supplement  DIET GENERAL; Dental Soft    Anthropometric Measures:  · Height: 5' 6\" (167.6 cm)  · Current Body Wt: 198 lb (89.8 kg)   · BMI: 32    Nutrition Interventions:   Food and/or Nutrient Delivery:  Continue Current Diet, Continue Oral Nutrition Supplement  Coordination of Nutrition Care:  Continue to monitor while inpatient    Goals:  PO intake >50%, wt stable       Nutrition Monitoring and Evaluation:   Food/Nutrient Intake Outcomes:  Food and Nutrient Intake, Supplement Intake  Physical Signs/Symptoms Outcomes:  Biochemical Data, Nutrition Focused Physical Findings, Weight, Fluid Status or Edema, Skin     Discharge Planning:    Continue Oral Nutrition Supplement     Electronically signed by Christelle Zuniga, MS, RD, LD on 4/8/21 at 1:59 PM CDT    Contact: [phone]

## 2021-04-08 NOTE — PROGRESS NOTES
Patient:   Levi Canas  MR#:    [id number]   Room:    0820/820-01   YOB: 1955  Date of Progress Note: 4/8/2021  Time of Note                           8:57 AM  Consulting Physician:   Trace Lewis M.D. Attending Physician:  Trace Lewis MD     CHIEF COMPLAINT: Generalized weakness and deconditioning     Subjective  This 77 y.o. female  admitted to [facility] initially on 1/14/2021. The patient had been in her usual state of health when she developed a torn right gluteal muscle. She underwent surgical repair on 1/14/21 and post-operative course was progressing. At her 4 week follow up, she developed increased bloody drainage from the wound with erythema. She presented for injection due to RA at which time she suffered a syncopal event. This was felt to be a vasovagal response and she returned to home. She was found later by a family member in the floor for an unknown length of time and the dressing to the right hip/gluteal wound was saturated. She presented to the hospital 2/8 and was noted to be febrile on intake. Workup revealed an elevated CPK and d dimer. Wound cultures were obtained. The patient was admitted to the service of the hospitalists at that time. She underwent pacemaker interrogation in workup for her syncopal episodes which detected no abnormalities. MRI brain negative for acute process. Purulent drainage from the wound was cultured and the patient was started on broad spectrum antibiotics. She was seen in consultation by orthopedics. Blood cultures returned positive for MSSA in all bottles. ID was consulted for antibiotic management at that time. She underwent debridement of the wound per ortho on 2/9. Post-operatively, the wound measures 20 cm x 6 cm and wound vac therapy was initiated. Surveillance cultures remained positive and surgical wound cultures positive for e. Coli. She continued with antibiotic therapy with Azactam under direction of ID.  Due to persistent bacteremia, she underwent ALFREDA which did not reveal any valvular vegetations. Due to her need for continued IV antibiotic therapy, wound care and rehabilitation, she transferred to the [facility]. She was seen in consultation by our wound care team and was followed by ID. She developed increased complaints of back pain and imaging revealed a right psoas abscess as well as possible L1-2 osteomyelitis discitis. Inflammatory markers were also elevated. The patient had also complained of increased weakness and incoordination to bilateral upper extremities. Neurosurgery was consulted and recommended cervical decompression as well as continued antibiotic therapy with plans for extension of lumbar fusion. She transferred to [facility] on 3/3 and underwent cervical corpectomy and decompression. She tolerated the procedure without difficulty and returned to our facility on 3/6. The patient was initially progressing well with therapy upon her return, but she developed increased right hip pain impeding further progress. Multiple regimen adjustments were undertaken without relief. She continued on antibiotic treatment under the direction of ID and continued wound care under the direction of our wound care team. She developed increased edema to BLE requiring gentle diuresis. Orthopedic surgery was consulted to evaluate the hip who felt it was more likely related to the persistent psoas abscess noted on imaging. Originally, the patient was scheduled for repeat drainage of the abscess on 3/19, but she was going to require sedation and had eaten breakfast. The procedure was then rescheduled for 3/22. Neurosurgery then did an L1-L2 discectomy on 3/23/21 for ongoing discitis osteomyelitis in that region that has thus far not responded to antibiotic therapy. Following I and D of psoas abscess, the patient was discharge from the [facility] to [facility] for planned neurosurgical procedure. Pt tolerated procedure well. She was fitted for a LSO brace.    No complaints today  REVIEW OF SYSTEMS:  Constitutional: No fevers No chills  Neck:No stiffness  Respiratory: No shortness of breath  Cardiovascular: No chest pain No palpitations  Gastrointestinal: No abdominal pain    Genitourinary: No Dysuria  Neurological: No headache, no confusion      PHYSICAL EXAM:  /68   Pulse 77   Temp 97.5 °F (36.4 °C) (Temporal)   Resp 16   Ht 5' 6\" (1.676 m)   Wt 198 lb 0.7 oz (89.8 kg)   SpO2 94%   BMI 31.97 kg/m²     Constitutional: she appears well-developed and well-nourished. Eyes  conjunctiva normal.  Pupils react to light  Ear, nose, throat -hearing intact to voice. No scars, masses, or lesions over external nose or ears, no atrophy of tongue  Neck-symmetric, no masses noted, no jugular vein distension  Respiration- chest wall appears symmetric, good expansion,   normal effort without use of accessory muscles  Cardiovascular- RRR  Musculoskeletal  no significant wasting of muscles noted, no bony deformities, gait no gross ataxia  Extremities-no clubbing, cyanosis or edema  Skin  warm, dry, and intact. No rash, erythema, or pallor. Psychiatric  mood, affect, and behavior appear normal.      Neurology  NEUROLOGICAL EXAM:  Awake, alert, fluent oriented x 3 appropriate affect  Attention and concentration appear appropriate  Recent and remote memory appears unremarkable  Speech normal without dysarthria  No clear issues with language of fund of knowledge     Cranial Nerve Exam   CN II- Visual fields grossly unremarkable  CN III, IV,VI-EOMI, No nystagmus, conjugate eye movements, no ptosis  CN VII-no facial assymetry  CN VIII-Hearing intact        Motor Exam  RA deformities noted in the hands. There is mild generalized weakness and right hip weakness which is partly antalgic.           Tremors- no tremors in hands or head noted     Gait  Not tested      Nursing/pcp notes, imaging,labs and vitals reviewed.      PT,OT and/or speech notes reviewed    Lab Results Component Value Date    WBC 7.8 04/08/2021    HGB 9.5 (L) 04/08/2021    HCT 32.3 (L) 04/08/2021    MCV 95.0 04/08/2021     04/08/2021     Lab Results   Component Value Date     04/08/2021    K 4.6 04/08/2021    CL 98 04/08/2021    CO2 33 (H) 04/08/2021    BUN 13 04/08/2021    CREATININE 0.5 04/08/2021    GLUCOSE 84 04/08/2021    CALCIUM 8.9 04/08/2021    PROT 5.4 (L) 03/29/2021    LABALBU 2.5 (L) 03/29/2021    BILITOT 0.5 03/29/2021    ALKPHOS 102 03/29/2021    AST 11 03/29/2021    ALT <5 (A) 03/29/2021    LABGLOM >60 04/08/2021    GFRAA >59 04/08/2021     Lab Results   Component Value Date    INR 1.20 (H) 12/05/2018    INR 1.14 09/04/2018    INR 1.08 07/10/2018     Lab Results   Component Value Date    CRP 3.19 (H) 04/08/2021     PHYSICAL THERAPY  STRENGTH  Strength RLE  Comment: 2-/5 HIP, 3/5 KNEE, 3+/5 ANKLE  ROM  AROM RLE (degrees)  RLE General AROM: DECREASED AT HIP  AROM LLE (degrees)  LLE AROM : WFL  BED MOBILITY  Bed Mobility  Rolling: Stand by assistance  Supine to Sit: Stand by assistance  Sit to Supine: Stand by assistance, Minimal assistance(Assist with Aubrey LE into bed)  Scooting: Contact guard assistance  Comment: log roll  TRANSFERS  Transfers  Sit to Stand: Contact guard assistance  Stand to sit: Contact guard assistance  Bed to Chair: Contact guard assistance(with RW from W/C to bed)  Stand Pivot Transfers: Contact guard assistance(RW)  Lateral Transfers: Minimal Assistance, Contact guard assistance(W/C to Recliner using RW)  Comment: LUE on w/c and RUE on walker going sit to stand. WHEELCHAIR PROPULSION  Propulsion 1  Propulsion: Manual  Level: Level Tile  Method: RUBINA FITZPATRICK  Level of Assistance: Stand by assistance, Contact guard assistance  Description/ Details: 2 TURNS  Distance: 50  AMBULATION  Ambulation 1  Surface: level tile  Device: Rolling Walker  Other Apparatus: Wheelchair follow  Assistance: Contact guard assistance  Quality of Gait: Step to pattern.   Gait Deviations: Slow Vania, Decreased step length  Distance: 2x35'  Comments: One rest break. No turns. STAIRS  GOALS:  Short term goals  Time Frame for Short term goals: 1 WEEK  Short term goal 1: CGA FOR ON BED MOBILITY  Short term goal 2: CGA FOR TRANSFERS  Short term goal 3: CGA AMB 50 FEET WITH AD  Short term goal 4: INDEP W/C PROPEL 50 FEET  Short term goal 5: CGA CAR TRANSFER     Long term goals  Time Frame for Long term goals : 2 WEEKS  Long term goal 1: INDEP ON BED AND TRANSFERS  Long term goal 2: INDPE  FEET WITH AD  Long term goal 3: INDPE W/C PROPEL 150 FEET  Long term goal 4: INDEP CAR TRANSFER  Long term goal 5: INDEP 4 STEP      ASSESSMENT:  Assessment: Pt. CGA with transfers and ambulation.         SPEECH THERAPY  The patient completed the CLQT today. She still exhibits difficulty with divergent naming and short term recall. Speech will continue to address this during therapy.     Mental manipulation tasks were completed. She was able to complete reverse recall of three digits with 100%. Paragraph recall tasks were completed at 60%. She was able to identify the largest number from a group of four at 70%.       Divergent naming tasks were completed and she was able to name up to eight items when given one minute.     She was able to provide words to definitions at 100%. She believes she is still having word finding difficulty during conversation.     Short term recall tasks were completed with delay and distraction. She was able to recall three unrelated words following one minute, five minute and ten minute delays.      Recommended Diet:  Solid consistency: Dental Soft  Liquid consistency: Thin  Liquid administration via: Cup;Straw     Medication administration: (Meds whole with water)     Safe Swallow Protocol:  Compensatory Swallowing Strategies:  Alternate solids and liquids;Upright as possible for all oral intake;Remain upright for 30-45 minutes after meals         SHORT TERM GOAL #1:  Goal 1: The patient will provide 15 members in a given category with 90% accuracy and (min)cues in the form of a visual aid, semantic/phonemic cueing, etc.     SHORT TERM GOAL #2:  Goal 2: The patient will demonstrate functional problem solving and safety awareness with 90% verbal,tactile and visual cues for daily living tasks in order to increase safe interaction with environmentand decreased assistance from caregivers     SHORT TERM GOAL #3:  Goal 3: The patient will demonstrate recall of functional information following a/an (immediate, shortterm,long-term) delay with min cues in order to increase functional integration into environment.     SHORT TERM GOAL #4:  Goal 4: The patient will recall 8 facts from (sentence, paragraph, several paragraph, page) length material with 90% accuracy and min cueing.     SHORT TERM GOAL #5:     Swallowing Short Term Goals  Short-term Goals  Goal 1: The patient will tolerate a dysphagia soft and bite sized diet with thin liquids with minimal overt s/s of aspiration. Goal 2: Staff/caregivers will complete daily oral hygiene to prevent aspiration of bacteria laden secretions. Goal 3: The patient will participate in a modified barium swallow study to futher assess swallow function. met     Comprehension: 4 - Patient understands basic needs 75-90%+ of the time  Expression: 4 - Expresses basic ideas/needs 75-90%+ of the time  Social Interaction: 4 - Patient appropriate 75-90%+ of the time  Problem Solving: 3 - Patient solves simple/routine tasks 50%-74%  Memory: 3 - Patient remembers 50%-74% of the time        OCCUPATIONAL THERAPY     CURRENT IRF-CAT SCORES  Eating: CARE Score: 4     Oral Hygiene: CARE Score: 5         Toileting: CARE Score: 4  Comment: CGA      Shower/Bathe: CARE Score: 3  Comment: Mod A, shower        Upper Body Dressing: CARE Score: 2  Comment:  Mod A for pullover shirt, Max A with c-collar       Lower Body Dressing: CARE Score: 2  Comment: Will need to assess AE        Footwear: CARE Score: 2  Comment: VC/supervision with donning/doffing socks using AE, Max A with shoes, attempted shoe horn, but unable.        Toilet Transfers: CARE Score: 4  Comment: CGA, w/c<>toilet using GB        Picking Up Object:  CARE Score: 88           UE Functioning:  B UE WFLs     Pain Assessment:  Pain Level: 8  Pain Location: Back     STGs:  Short term goals  Time Frame for Short term goals: 1 week  Short term goal 1: MET  Short term goal 2: pt will complete LB dressing with  min A  Short term goal 3: pt will complete overall bathing with min A  Short term goal 4: pt will complete simple ambulatory home making task with min A  Short term goal 5: pt will don/doff back brace with independence  Short term goal 6: pt will complete 1-2 handed standing task for 3 mins with CGA  Short term goal 7: pt will complete R FM/sensory acts to improve function of hand during ADL task  Short term goal 8: Pt will increase B  strength by 2#. Short term goal 9: Pt will decrease B 9 hole peg test time by 2 seconds.     LTGs:  Long term goals  Time Frame for Long term goals : 2 weeks  Long term goal 1: complete overall dressing with supervision  Long term goal 2: complete overall bathing with supervision  Long term goal 3: complete overall toileting with supervision  Long term goal 4: complete simple ambulatory home making task with supervision  Long term goal 5: complete HEP with independence     Assessment:  Performance deficits / Impairments: Decreased high-level IADLs, Decreased functional mobility , Decreased endurance, Decreased ADL status, Decreased balance, Decreased strength, Decreased coordination, Decreased sensation                        NUTRITION  Current Wt: Weight: 198 lb 0.7 oz (89.8 kg) / Body mass index is 31.97 kg/m².   Admission Wt: Admission Body Weight: 205 lb (93 kg)  Oral Diet Orders: General; Dental Soft  Oral Nutrition Supplement (ONS) Orders:Ensure Enlive BID; Ramiro BID     Pt continues to improve nutritionally AEB adequate po intakes (>50% most meals) and stable wt since previous assessment. Pt reports drinking 100% ONS (Ramiro and Ensures). Encouraged continued good po intake and high protein intake for wound healing. Please see nutrition notes for further details.        RECORD REVIEW: Previous medical records, medications were reviewed at today's visit    IMPRESSION:   1. Right psoas abscess and L1/2 discitis/osteomyelitis MSSA with generalized weakness and deconditioning. Continue Ancef for another 8 to 12 weeks most likely. PT/OT for generalized weakness and deconditioning. Continue wound VAC for right gluteal tear/repair  2. GIcontinue with medications for constipation. reglan   3. DVT prophylaxissubcu heparin  4. Rheumatoid arthritislow-dose prednisone  5. Coronary artery diseasecontinue Imdur  6. Hypomagnesemia/hypokalemiareplacement ordered  Appreciate ID assistance  Added 300 mg gabapentin at bedtime to help her get through the night  Prednisone taper continues with plan to keep at 5 mg.   Lidoderm patch for costochondritis      ELOS:4/12, Monday , snf anticipated

## 2021-04-09 PROCEDURE — 97110 THERAPEUTIC EXERCISES: CPT

## 2021-04-09 PROCEDURE — 1180000000 HC REHAB R&B

## 2021-04-09 PROCEDURE — 99232 SBSQ HOSP IP/OBS MODERATE 35: CPT | Performed by: PSYCHIATRY & NEUROLOGY

## 2021-04-09 PROCEDURE — 97129 THER IVNTJ 1ST 15 MIN: CPT

## 2021-04-09 PROCEDURE — 6370000000 HC RX 637 (ALT 250 FOR IP): Performed by: PSYCHIATRY & NEUROLOGY

## 2021-04-09 PROCEDURE — 6360000002 HC RX W HCPCS: Performed by: PSYCHIATRY & NEUROLOGY

## 2021-04-09 PROCEDURE — 2580000003 HC RX 258: Performed by: PSYCHIATRY & NEUROLOGY

## 2021-04-09 PROCEDURE — 97535 SELF CARE MNGMENT TRAINING: CPT

## 2021-04-09 PROCEDURE — 97116 GAIT TRAINING THERAPY: CPT

## 2021-04-09 PROCEDURE — 97130 THER IVNTJ EA ADDL 15 MIN: CPT

## 2021-04-09 PROCEDURE — 97530 THERAPEUTIC ACTIVITIES: CPT

## 2021-04-09 PROCEDURE — 97606 NEG PRS WND THER DME>50 SQCM: CPT

## 2021-04-09 RX ADMIN — METOCLOPRAMIDE 5 MG: 5 TABLET ORAL at 05:45

## 2021-04-09 RX ADMIN — NALOXEGOL OXALATE 25 MG: 12.5 TABLET, FILM COATED ORAL at 07:48

## 2021-04-09 RX ADMIN — SODIUM CHLORIDE, PRESERVATIVE FREE 10 ML: 5 INJECTION INTRAVENOUS at 15:43

## 2021-04-09 RX ADMIN — GUAIFENESIN 1200 MG: 600 TABLET, EXTENDED RELEASE ORAL at 22:16

## 2021-04-09 RX ADMIN — DOCUSATE SODIUM 100 MG: 100 CAPSULE, LIQUID FILLED ORAL at 07:48

## 2021-04-09 RX ADMIN — GABAPENTIN 300 MG: 300 CAPSULE ORAL at 22:35

## 2021-04-09 RX ADMIN — SODIUM CHLORIDE, PRESERVATIVE FREE 10 ML: 5 INJECTION INTRAVENOUS at 22:38

## 2021-04-09 RX ADMIN — OXYCODONE 15 MG: 5 TABLET ORAL at 07:49

## 2021-04-09 RX ADMIN — Medication 2000 MG: at 15:42

## 2021-04-09 RX ADMIN — OXYCODONE 15 MG: 5 TABLET ORAL at 13:12

## 2021-04-09 RX ADMIN — DOCUSATE SODIUM 100 MG: 100 CAPSULE, LIQUID FILLED ORAL at 22:16

## 2021-04-09 RX ADMIN — GUAIFENESIN 1200 MG: 600 TABLET, EXTENDED RELEASE ORAL at 07:48

## 2021-04-09 RX ADMIN — METOCLOPRAMIDE 5 MG: 5 TABLET ORAL at 15:42

## 2021-04-09 RX ADMIN — POTASSIUM CHLORIDE 20 MEQ: 1500 TABLET, EXTENDED RELEASE ORAL at 07:49

## 2021-04-09 RX ADMIN — ISOSORBIDE MONONITRATE 60 MG: 60 TABLET, EXTENDED RELEASE ORAL at 22:17

## 2021-04-09 RX ADMIN — TRAMADOL HYDROCHLORIDE 50 MG: 50 TABLET, FILM COATED ORAL at 07:50

## 2021-04-09 RX ADMIN — POLYETHYLENE GLYCOL 3350 17 G: 17 POWDER, FOR SOLUTION ORAL at 07:50

## 2021-04-09 RX ADMIN — GABAPENTIN 100 MG: 100 CAPSULE ORAL at 07:49

## 2021-04-09 RX ADMIN — Medication 1 TABLET: at 07:49

## 2021-04-09 RX ADMIN — CARVEDILOL 25 MG: 25 TABLET, FILM COATED ORAL at 16:58

## 2021-04-09 RX ADMIN — OXYCODONE 15 MG: 5 TABLET ORAL at 22:16

## 2021-04-09 RX ADMIN — PREDNISONE 10 MG: 5 TABLET ORAL at 07:48

## 2021-04-09 RX ADMIN — FUROSEMIDE 40 MG: 40 TABLET ORAL at 07:50

## 2021-04-09 RX ADMIN — SODIUM CHLORIDE, PRESERVATIVE FREE 10 ML: 5 INJECTION INTRAVENOUS at 07:44

## 2021-04-09 RX ADMIN — GABAPENTIN 100 MG: 100 CAPSULE ORAL at 22:35

## 2021-04-09 RX ADMIN — LEVOTHYROXINE SODIUM 75 MCG: 75 TABLET ORAL at 05:45

## 2021-04-09 RX ADMIN — CARVEDILOL 25 MG: 25 TABLET, FILM COATED ORAL at 07:48

## 2021-04-09 RX ADMIN — ACYCLOVIR 200 MG: 200 CAPSULE ORAL at 22:16

## 2021-04-09 RX ADMIN — SODIUM CHLORIDE, PRESERVATIVE FREE 10 ML: 5 INJECTION INTRAVENOUS at 01:44

## 2021-04-09 RX ADMIN — PANTOPRAZOLE SODIUM 40 MG: 40 TABLET, DELAYED RELEASE ORAL at 07:49

## 2021-04-09 RX ADMIN — TRAMADOL HYDROCHLORIDE 50 MG: 50 TABLET, FILM COATED ORAL at 22:17

## 2021-04-09 RX ADMIN — ISOSORBIDE MONONITRATE 60 MG: 60 TABLET, EXTENDED RELEASE ORAL at 07:50

## 2021-04-09 RX ADMIN — HEPARIN SODIUM 5000 UNITS: 5000 INJECTION INTRAVENOUS; SUBCUTANEOUS at 07:50

## 2021-04-09 RX ADMIN — ACYCLOVIR 400 MG: 200 CAPSULE ORAL at 07:50

## 2021-04-09 RX ADMIN — Medication 2000 MG: at 07:44

## 2021-04-09 RX ADMIN — Medication 2000 MG: at 01:43

## 2021-04-09 RX ADMIN — ACYCLOVIR 200 MG: 200 CAPSULE ORAL at 22:35

## 2021-04-09 RX ADMIN — OXYCODONE 15 MG: 5 TABLET ORAL at 16:58

## 2021-04-09 RX ADMIN — HEPARIN SODIUM 5000 UNITS: 5000 INJECTION INTRAVENOUS; SUBCUTANEOUS at 22:17

## 2021-04-09 RX ADMIN — DOCUSATE SODIUM 50 MG AND SENNOSIDES 8.6 MG 2 TABLET: 8.6; 5 TABLET, FILM COATED ORAL at 07:49

## 2021-04-09 RX ADMIN — MAGNESIUM OXIDE TAB 400 MG (240 MG ELEMENTAL MG) 400 MG: 400 (240 MG) TAB at 07:50

## 2021-04-09 RX ADMIN — TRAMADOL HYDROCHLORIDE 50 MG: 50 TABLET, FILM COATED ORAL at 13:12

## 2021-04-09 RX ADMIN — METOCLOPRAMIDE 5 MG: 5 TABLET ORAL at 12:01

## 2021-04-09 ASSESSMENT — PAIN SCALES - GENERAL
PAINLEVEL_OUTOF10: 4
PAINLEVEL_OUTOF10: 6
PAINLEVEL_OUTOF10: 0
PAINLEVEL_OUTOF10: 2
PAINLEVEL_OUTOF10: 2
PAINLEVEL_OUTOF10: 4

## 2021-04-09 ASSESSMENT — PAIN DESCRIPTION - FREQUENCY
FREQUENCY: INTERMITTENT
FREQUENCY: INTERMITTENT

## 2021-04-09 ASSESSMENT — PAIN DESCRIPTION - ORIENTATION
ORIENTATION: LOWER;UPPER
ORIENTATION: RIGHT;LOWER

## 2021-04-09 ASSESSMENT — PAIN DESCRIPTION - PROGRESSION
CLINICAL_PROGRESSION: NOT CHANGED
CLINICAL_PROGRESSION: GRADUALLY IMPROVING
CLINICAL_PROGRESSION: GRADUALLY IMPROVING

## 2021-04-09 ASSESSMENT — PAIN DESCRIPTION - LOCATION
LOCATION: HIP;BACK

## 2021-04-09 ASSESSMENT — PAIN DESCRIPTION - DESCRIPTORS
DESCRIPTORS: ACHING
DESCRIPTORS: ACHING

## 2021-04-09 ASSESSMENT — PAIN DESCRIPTION - ONSET
ONSET: GRADUAL

## 2021-04-09 ASSESSMENT — PAIN - FUNCTIONAL ASSESSMENT
PAIN_FUNCTIONAL_ASSESSMENT: PREVENTS OR INTERFERES SOME ACTIVE ACTIVITIES AND ADLS
PAIN_FUNCTIONAL_ASSESSMENT: PREVENTS OR INTERFERES SOME ACTIVE ACTIVITIES AND ADLS
PAIN_FUNCTIONAL_ASSESSMENT: ACTIVITIES ARE NOT PREVENTED

## 2021-04-09 ASSESSMENT — PAIN DESCRIPTION - PAIN TYPE
TYPE: CHRONIC PAIN

## 2021-04-09 NOTE — PROGRESS NOTES
Patient:   Durga Tinajero  MR#:    [id number]   Room:    0820/820-01   YOB: 1955  Date of Progress Note: 4/9/2021  Time of Note                           8:44 AM  Consulting Physician:   Deanna Fuchs M.D. Attending Physician:  Deanna Fuchs MD     CHIEF COMPLAINT: Generalized weakness and deconditioning     Subjective  This 77 y.o. female  admitted to [facility] initially on 1/14/2021. The patient had been in her usual state of health when she developed a torn right gluteal muscle. She underwent surgical repair on 1/14/21 and post-operative course was progressing. At her 4 week follow up, she developed increased bloody drainage from the wound with erythema. She presented for injection due to RA at which time she suffered a syncopal event. This was felt to be a vasovagal response and she returned to home. She was found later by a family member in the floor for an unknown length of time and the dressing to the right hip/gluteal wound was saturated. She presented to the hospital 2/8 and was noted to be febrile on intake. Workup revealed an elevated CPK and d dimer. Wound cultures were obtained. The patient was admitted to the service of the hospitalists at that time. She underwent pacemaker interrogation in workup for her syncopal episodes which detected no abnormalities. MRI brain negative for acute process. Purulent drainage from the wound was cultured and the patient was started on broad spectrum antibiotics. She was seen in consultation by orthopedics. Blood cultures returned positive for MSSA in all bottles. ID was consulted for antibiotic management at that time. She underwent debridement of the wound per ortho on 2/9. Post-operatively, the wound measures 20 cm x 6 cm and wound vac therapy was initiated. Surveillance cultures remained positive and surgical wound cultures positive for e. Coli. She continued with antibiotic therapy with Azactam under direction of ID.  Due to persistent imaging,labs and vitals reviewed. PT,OT and/or speech notes reviewed    Lab Results   Component Value Date    WBC 7.8 04/08/2021    HGB 9.5 (L) 04/08/2021    HCT 32.3 (L) 04/08/2021    MCV 95.0 04/08/2021     04/08/2021     Lab Results   Component Value Date     04/08/2021    K 4.6 04/08/2021    CL 98 04/08/2021    CO2 33 (H) 04/08/2021    BUN 13 04/08/2021    CREATININE 0.5 04/08/2021    GLUCOSE 84 04/08/2021    CALCIUM 8.9 04/08/2021    PROT 5.4 (L) 03/29/2021    LABALBU 2.5 (L) 03/29/2021    BILITOT 0.5 03/29/2021    ALKPHOS 102 03/29/2021    AST 11 03/29/2021    ALT <5 (A) 03/29/2021    LABGLOM >60 04/08/2021    GFRAA >59 04/08/2021     Lab Results   Component Value Date    INR 1.20 (H) 12/05/2018    INR 1.14 09/04/2018    INR 1.08 07/10/2018     Lab Results   Component Value Date    CRP 3.19 (H) 04/08/2021     PHYSICAL THERAPY  STRENGTH  Strength RLE  Comment: 2-/5 HIP, 3/5 KNEE, 3+/5 ANKLE  ROM  AROM RLE (degrees)  RLE General AROM: DECREASED AT HIP  AROM LLE (degrees)  LLE AROM : WFL  BED MOBILITY  Bed Mobility  Rolling: Stand by assistance  Supine to Sit: Stand by assistance  Sit to Supine: Stand by assistance, Minimal assistance(Assist with Aubrey LE into bed)  Scooting: Contact guard assistance  Comment: log roll  TRANSFERS  Transfers  Sit to Stand: Contact guard assistance  Stand to sit: Contact guard assistance  Bed to Chair: Contact guard assistance(with RW from W/C to bed)  Stand Pivot Transfers: Contact guard assistance(RW)  Lateral Transfers: Minimal Assistance, Contact guard assistance(W/C to Recliner using RW)  Comment: LUE on w/c and RUE on walker going sit to stand.    WHEELCHAIR PROPULSION  Propulsion 1  Propulsion: Manual  Level: Level Tile  Method: RUBINA FITZPATRICK  Level of Assistance: Stand by assistance, Contact guard assistance  Description/ Details: 2 TURNS  Distance: 50  AMBULATION  Ambulation 1  Surface: level tile  Device: Rolling Walker  Other Apparatus: Wheelchair follow Assistance: Contact guard assistance  Quality of Gait: Step to pattern. Gait Deviations: Slow Vania, Decreased step length  Distance: 2x35'  Comments: One rest break. No turns. STAIRS  GOALS:  Short term goals  Time Frame for Short term goals: 1 WEEK  Short term goal 1: CGA FOR ON BED MOBILITY  Short term goal 2: CGA FOR TRANSFERS  Short term goal 3: CGA AMB 50 FEET WITH AD  Short term goal 4: INDEP W/C PROPEL 50 FEET  Short term goal 5: CGA CAR TRANSFER     Long term goals  Time Frame for Long term goals : 2 WEEKS  Long term goal 1: INDEP ON BED AND TRANSFERS  Long term goal 2: INDPE  FEET WITH AD  Long term goal 3: INDPE W/C PROPEL 150 FEET  Long term goal 4: INDEP CAR TRANSFER  Long term goal 5: INDEP 4 STEP      ASSESSMENT:  Assessment: Pt. CGA with transfers and ambulation.         SPEECH THERAPY  The patient completed the CLQT today. She still exhibits difficulty with divergent naming and short term recall. Speech will continue to address this during therapy.     Mental manipulation tasks were completed. She was able to complete reverse recall of three digits with 100%. Paragraph recall tasks were completed at 60%. She was able to identify the largest number from a group of four at 70%.       Divergent naming tasks were completed and she was able to name up to eight items when given one minute.     She was able to provide words to definitions at 100%. She believes she is still having word finding difficulty during conversation.     Short term recall tasks were completed with delay and distraction. She was able to recall three unrelated words following one minute, five minute and ten minute delays.      Recommended Diet:  Solid consistency: Dental Soft  Liquid consistency: Thin  Liquid administration via: Cup;Straw     Medication administration: (Meds whole with water)     Safe Swallow Protocol:  Compensatory Swallowing Strategies:  Alternate solids and liquids;Upright as possible for all oral intake;Remain upright for 30-45 minutes after meals         SHORT TERM GOAL #1:  Goal 1: The patient will provide 15 members in a given category with 90% accuracy and (min)cues in the form of a visual aid, semantic/phonemic cueing, etc.     SHORT TERM GOAL #2:  Goal 2: The patient will demonstrate functional problem solving and safety awareness with 90% verbal,tactile and visual cues for daily living tasks in order to increase safe interaction with environmentand decreased assistance from caregivers     SHORT TERM GOAL #3:  Goal 3: The patient will demonstrate recall of functional information following a/an (immediate, shortterm,long-term) delay with min cues in order to increase functional integration into environment.     SHORT TERM GOAL #4:  Goal 4: The patient will recall 8 facts from (sentence, paragraph, several paragraph, page) length material with 90% accuracy and min cueing.     SHORT TERM GOAL #5:     Swallowing Short Term Goals  Short-term Goals  Goal 1: The patient will tolerate a dysphagia soft and bite sized diet with thin liquids with minimal overt s/s of aspiration. Goal 2: Staff/caregivers will complete daily oral hygiene to prevent aspiration of bacteria laden secretions. Goal 3: The patient will participate in a modified barium swallow study to futher assess swallow function. met     Comprehension: 4 - Patient understands basic needs 75-90%+ of the time  Expression: 4 - Expresses basic ideas/needs 75-90%+ of the time  Social Interaction: 4 - Patient appropriate 75-90%+ of the time  Problem Solving: 3 - Patient solves simple/routine tasks 50%-74%  Memory: 3 - Patient remembers 50%-74% of the time        OCCUPATIONAL THERAPY     CURRENT IRF-CAT SCORES  Eating: CARE Score: 4     Oral Hygiene: CARE Score: 5         Toileting: CARE Score: 4  Comment: CGA      Shower/Bathe: CARE Score: 3  Comment: Mod A, shower        Upper Body Dressing: CARE Score: 2  Comment:  Mod A for pullover shirt, Max A with c-collar       Lower Body Dressing: CARE Score: 2  Comment: Will need to assess AE        Footwear: CARE Score: 2  Comment: VC/supervision with donning/doffing socks using AE, Max A with shoes, attempted shoe horn, but unable.        Toilet Transfers: CARE Score: 4  Comment: CGA, w/c<>toilet using GB        Picking Up Object:  CARE Score: 88           UE Functioning:  B UE WFLs     Pain Assessment:  Pain Level: 8  Pain Location: Back     STGs:  Short term goals  Time Frame for Short term goals: 1 week  Short term goal 1: MET  Short term goal 2: pt will complete LB dressing with  min A  Short term goal 3: pt will complete overall bathing with min A  Short term goal 4: pt will complete simple ambulatory home making task with min A  Short term goal 5: pt will don/doff back brace with independence  Short term goal 6: pt will complete 1-2 handed standing task for 3 mins with CGA  Short term goal 7: pt will complete R FM/sensory acts to improve function of hand during ADL task  Short term goal 8: Pt will increase B  strength by 2#. Short term goal 9: Pt will decrease B 9 hole peg test time by 2 seconds.     LTGs:  Long term goals  Time Frame for Long term goals : 2 weeks  Long term goal 1: complete overall dressing with supervision  Long term goal 2: complete overall bathing with supervision  Long term goal 3: complete overall toileting with supervision  Long term goal 4: complete simple ambulatory home making task with supervision  Long term goal 5: complete HEP with independence     Assessment:  Performance deficits / Impairments: Decreased high-level IADLs, Decreased functional mobility , Decreased endurance, Decreased ADL status, Decreased balance, Decreased strength, Decreased coordination, Decreased sensation                        NUTRITION  Current Wt: Weight: 198 lb 0.7 oz (89.8 kg) / Body mass index is 31.97 kg/m².   Admission Wt: Admission Body Weight: 205 lb (93 kg)  Oral Diet Orders: General; Dental Soft Oral Nutrition Supplement (ONS) Orders:Ensure Enlive BID; Ramiro BID     Pt continues to improve nutritionally AEB adequate po intakes (>50% most meals) and stable wt since previous assessment. Pt reports drinking 100% ONS (Ramiro and Ensures). Encouraged continued good po intake and high protein intake for wound healing. Please see nutrition notes for further details.        RECORD REVIEW: Previous medical records, medications were reviewed at today's visit    IMPRESSION:   1. Right psoas abscess and L1/2 discitis/osteomyelitis MSSA with generalized weakness and deconditioning. Continue Ancef for another 8 to 12 weeks most likely. PT/OT for generalized weakness and deconditioning. Continue wound VAC for right gluteal tear/repair  2. GIcontinue with medications for constipation. reglan   3. DVT prophylaxissubcu heparin  4. Rheumatoid arthritislow-dose prednisone  5. Coronary artery diseasecontinue Imdur  6. Hypomagnesemia/hypokalemiareplacement ordered  Appreciate ID assistance  Added 300 mg gabapentin at bedtime to help her get through the night  Prednisone taper continues with plan to keep at 5 mg.   Lidoderm patch for costochondritis      ELOS:4/12, Monday , snf anticipated

## 2021-04-09 NOTE — PROGRESS NOTES
Murray Barr  [id number]     04/09/21 7976 04/09/21 0936 04/09/21 0946   Subjective   Subjective  --  Pt agreed to therapy. Says that she was feeling upset today. --    Ambulation   Ambulation? Yes  --   --    Ambulation 1   Surface level tile  --   --    Device Rolling Walker  --   --    Assistance Contact guard assistance  --   --    Quality of Gait Left hip drop, Left foot no heel strike, right foot limited stance time  --   --    Gait Deviations Slow Vania;Decreased step length  --   --    Distance 100'  --   --    Wheelchair Activities   Propulsion Yes  --   --    Propulsion 1   Propulsion Manual  --   --    Level Level Tile  --   --    Method LLE;RUE;LUE  --   --    Level of Assistance Stand by assistance;Contact guard assistance  --   --    Description/ Details pt. was dragging RLE needed verbal cues to use UE and correct way to use R foot to push. --   --    Distance 30'  --   --    Exercises   Comments  --   --  Seated BLE x15 reps, hip abd. hold for 5 sec w/ 15 reps x2. Put 1lb ankle weight on RLE. Conditions Requiring Skilled Therapeutic Intervention   Assessment  --   --   --    Prognosis  --   --   --    Activity Tolerance   Activity Tolerance  --   --   --       04/09/21 0950   Subjective   Subjective  --    Ambulation   Ambulation? --    Ambulation 1   Surface  --    Device  --    Assistance  --    Quality of Gait  --    Gait Deviations  --    Distance  --    Wheelchair Activities   Propulsion  --    Propulsion 1   Propulsion  --    Level  --    Method  --    Level of Assistance  --    Description/ Details  --    Distance  --    Exercises   Comments  --    Conditions Requiring Skilled Therapeutic Intervention   Assessment Pt. was able to complete ther ex and amb. today with limited to no rest breaks. Noted some gait deviations and required some verbal cues to correct those.    Prognosis Good   Activity Tolerance   Activity Tolerance Patient Tolerated treatment well   Electronically signed by Mela Reynolds, PTA on 4/9/2021 at 11:02 AM

## 2021-04-09 NOTE — PROGRESS NOTES
04/09/21 1430   Restrictions/Precautions   Restrictions/Precautions Fall Risk;Weight Bearing   Lower Extremity Weight Bearing Restrictions   Right Lower Extremity Weight Bearing Weight Bearing As Tolerated   Left Lower Extremity Weight Bearing Weight Bearing As Tolerated   Required Braces or Orthoses   Cervical c-collar   Spinal Lumbar Corset   Spinal Other LSO   Position Activity Restriction   Spinal Precautions No Bending; No Lifting; No Twisting   Pain Assessment   Pain Assessment 0-10   Pain Level 6   Patient's Stated Pain Goal No pain   Pain Type Chronic pain   Pain Location Back   Pain Orientation Lower; Upper   Pain Descriptors Aching   Pain Frequency Intermittent   Pain Onset Gradual   Clinical Progression Not changed   Functional Pain Assessment Prevents or interferes some active activities and ADLs   Non-Pharmaceutical Pain Intervention(s) Distraction   Response to Pain Intervention Patient Satisfied   Bed Mobility   Rolling Stand by assistance;Supervision  (TO THE RIGHT )   Supine to Sit Supervision;Stand by assistance   Sit to Supine Stand by assistance;Contact guard assistance   Transfers   Sit to Stand Contact guard assistance;Stand by assistance  (RW)   Stand to sit Contact guard assistance;Stand by assistance  (RW)   Stand Pivot Transfers Contact guard assistance   Propulsion 1   Propulsion Manual   Level Level Tile   Method RUE;LUE   Level of Assistance Stand by assistance   Description/ Details R AND L TURNS. ABLE TO MANUEVER AROUND OBSTACLES AND REVERSE DIRECTION. Distance 75 FT    Other exercises   Other exercises 1 SEATED HIP ABDUCTION/ADDUCTION X20   (MIN RESISTANCE )   Other exercises 2 SEATED HIP MARCHES X20   Other exercises 3 SEATED BLE KNEE EXT X20 WITH CUES FOR ECC CONTROL    Conditions Requiring Skilled Therapeutic Intervention   Body structures, Functions, Activity limitations Decreased functional mobility ; Decreased ROM; Decreased strength;Decreased endurance;Decreased balance;Decreased fine motor control;Decreased coordination; Increased pain   Assessment TRIALED WC PROPULSION USING BUE. PT HAD NO DIFFICULTY PERFORMING AND WAS ABLE TO MANUEVER AROUND OBSTACLES AND COULD REVERSE/CHANGE DIRECTION WITH EASE. PT DID NOT FEEL UP FOR WALKING THIS AFTERNOON DUE TO INCREASED BACK PAIN. Activity Tolerance   Activity Tolerance Patient Tolerated treatment well;Patient limited by pain   Safety Devices   Type of devices All fall risk precautions in place;Call light within reach; Left in bed;Bed alarm in place

## 2021-04-09 NOTE — PROGRESS NOTES
[facility]  INPATIENT SPEECH THERAPY  [facility] 8 REHAB UNIT  TIME   Time In: 1400  Time Out: 1500  Minutes: 60  Total Treatment Time: 60     [x]? Daily Note  []? 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 I&D PSOAS ABCESS     Additional Pertinent Hx: RECENT HX OF CX SX, BACTEREMIA  RESTRICTIONS/PRECAUTIONS:    Restrictions/Precautions  Restrictions/Precautions: Fall Risk, Weight Bearing  Required Braces or Orthoses?: Yes  Position Activity Restriction  Spinal Precautions: No Bending, No Lifting, No Twisting          Subjective:   Patient alert and cooperative for all therapy tasks. Patient reported she is sleeping well. Patient reported her pain level was at 5 this date.     Objective:  [place] divergent naming task completed. Patient named 20 items in the category. Abstract divergent naming tasks completed. Patient named 5 items in the first category and 6 items in the second category. Patient was given 60-90 seconds for each tasks. Short Term Memory tasks completed. Patient was presented a visual for 30 seconds and instructed to memorize details from the picture. Patient was able to recall details with 90% accuracy. Recall of information from a paragraph completed to also assess short term memory. Patient was able to recall information with 60% accuracy. Patient had difficulty with word recall 2x during this structured task. Patient able to recall functional information regarding grocery shopping and cooking activities at home with 90% accuracy. The CLQT was completed this week.  Her scores are:    CLQT  Attention 159 179-125 Mild   Memory 167 185-155 WNL   Ex Function 21 23-20 Mild   Language 29 37-29 WNL   Visuospatial Skills 78 81-52 Mild   Composite Score 3.4 3.4-2.5 Mild          SHORT TERM GOAL #1:  Goal 1: The patient will provide 15 members in a given category with 90% accuracy and (min)cues in the form of a visual aid, semantic/phonemic cueing, etc.     SHORT TERM GOAL #2:  Goal 2: The patient will demonstrate functional problem solving and safety awareness with 90% verbal,tactile and visual cues for daily living tasks in order to increase safe interaction with environmentand decreased assistance from caregivers     SHORT TERM GOAL #3:  Goal 3: The patient will demonstrate recall of functional information following a/an (immediate, shortterm,long-term) delay with min cues in order to increase functional integration into environment.     SHORT TERM GOAL #4:  Goal 4: The patient will recall 8 facts from (sentence, paragraph, several paragraph, page) length material with 90% accuracy and min cueing.        Swallowing Short Term Goals  Short-term Goals  Goal 1: The patient will tolerate a dysphagia soft and bite sized diet with thin liquids with minimal overt s/s of aspiration. Goal 2: Staff/caregivers will complete daily oral hygiene to prevent aspiration of bacteria laden secretions. Goal 3: The patient will participate in a modified barium swallow study to futher assess swallow function. met     Comprehension: 4 - Patient understands basic needs 75-90%+ of the time  Expression: 4 - Expresses basic ideas/needs 75-90%+ of the time  Social Interaction: 4 - Patient appropriate 75-90%+ of the time  Problem Solving: 3 - Patient solves simple/routine tasks 50%-74%  Memory: 3 - Patient remembers 50%-74% of the time     ASSESSMENT:  Assessment: [x]? Progressing towards goals           []? Not Progressing towards goals     Patient Tolerance of Treatment:       [x]? Tolerated well          []? Tolerated fair           []? Required rest breaks          []? Fatigued     Education:  Learner: [x]?Patient          []? Significant Other          []? Other  Education provided regarding:  [x]? Goals and POC                               []?Diet and swallowing precautions                  []?Home Exercise Program                 [x]? Progress and/or discharge information  Method of Education:  [x]? Discussion          []? Demonstration          []? Handout          []? Other  Evaluation of Education:   [x]? Verbalized understanding                           []?Demonstrates without assistance  []? Demonstrates with assistance                    []?Needs further instruction                               []?No evidence of learning                              []?No family present                    Plan:   [x]? Continue with current plan of care                []?Modify current plan of care as follows:               []?Discharge patient                          Discharge Location:                          Services/Supervision Recommended:       [x]? Patient continues to require treatment by a licensed therapist to address functional deficits as outlined in the established plan of care.       Electronically Signed By:  Sissy Luna  4/9/2021,3:39 PM.    Electronically signed by Zackery Zapata on 4/9/2021 at 3:11 PM

## 2021-04-09 NOTE — PROGRESS NOTES
Occupational Therapy     04/09/21 1000   Pre Treatment Pain Screening   Intervention List Patient able to continue with treatment   General   Additional Pertinent Hx 1/14 torn R gluteal repair,2/18 pacemaker interrogation workup--no abnormalities, 2/9 R gluteal wound debridement with wound vac initiation, psoas abscess--[facility] admit soon after, 3/3 and underwent cervical corpectomy and decompression, returned to [facility] on 3/6,L1-L2 discectomy on 3/23/21   Pain Assessment   Patient Currently in Pain Yes   Pain Assessment 0-10   Pain Level 4   Pain Type Chronic pain   Pain Location Hip;Back   Pain Orientation Lower; Upper   Pain Descriptors Aching   Pain Frequency Intermittent   Clinical Progression Gradually improving   Response to Pain Intervention Patient Satisfied   Balance   Sitting Balance Independent   Standing Balance Supervision   Functional Mobility   Functional - Mobility Device Rolling Walker  (and w/c.)   Activity To/from bathroom; Retrieve items;Transport items  (To/from bathroom in w/c.)   Assist Level Stand by assistance  (SBA RW, supervision w/c.)   Functional Mobility Comments Attempted light homemaking task from RW level in ADL apartment, but had to complete from w/c level d/t increasing pain in hip and back. Demonstrated good safety throughout task. Bed mobility   Sit to Supine Minimal assistance   Transfers   Sit to stand Supervision   Stand to sit Supervision   Assessment   Performance deficits / Impairments Decreased high-level IADLs;Decreased functional mobility ; Decreased endurance;Decreased ADL status; Decreased balance;Decreased strength;Decreased coordination;Decreased sensation   Treatment Diagnosis R Gluteal abscess, recent cervical decompression, MSSA on long-term antibiotics due to recent discitis,, L1-2 discectomy   Prognosis Good   Timed Code Treatment Minutes 60 Minutes   Activity Tolerance   Activity Tolerance Patient Tolerated treatment well   Safety Devices   Safety Devices in place Yes   Type of devices Call light within reach; Bed alarm in place   Plan   Current Treatment Recommendations Self-Care / ADL; Safety Education & Training;Pain Management; Endurance Training;Functional Mobility Training;Strengthening;Patient/Caregiver Education & Training;Home Management Training;Equipment Evaluation, Education, & procurement;Balance Training;Positioning      04/09/21 1000   Toileting Hygiene   Assistance Needed Supervision or touching assistance   CARE Score 4

## 2021-04-09 NOTE — PLAN OF CARE
Problem: Falls - Risk of:  Goal: Will remain free from falls  Description: Will remain free from falls  4/9/2021 1049 by Viry Fontenot RN  Outcome: Ongoing  4/9/2021 0025 by Chon Stout LPN  Outcome: Ongoing   Up with 1-2 assist depending on fatigue level  Problem: Pain:  Description: Pain management should include both nonpharmacologic and pharmacologic interventions.   Goal: Pain level will decrease  Description: Pain level will decrease  4/9/2021 1049 by Viry Fontenot RN  Outcome: Ongoing  4/9/2021 0025 by Chon Stout LPN  Outcome: Ongoing   Medication as ordered

## 2021-04-09 NOTE — CARE COORDINATION
Discharge Planning:  Ms. Luis Felipe Mendenhall upset about discharge destination and decision making process. She had expected to be able to go to her sister's home, however her care needs still exceed sister's ability. Ms. Luis Felipe Mendenhall questions that- she believes she can walk in the room with walker without assistance, she did not understand whey antibiotics could not be changed to p.o., and why wound vac was an issue. I gave further details identifying her care needs and I talked also with her son,  Luis Felipe Mendenhall whose question was what did therapists think about her abilities- was she independent. Agree that her needs continue to require skilled care, referral was made to [facility] and care accepted, transition expected Monday. She is concerned that since she was only able to have one of the COVID vaccination shots, she has to be in quarantine - does that mean she won't get therapy? We call the liaison with [facility] and got clarification about those procedures with her- they have a graham that is quarantine with a separate therapy gym and she will be treated one at a time; when quarantine is over she will move to another graham.

## 2021-04-09 NOTE — PROGRESS NOTES
[facility]  Follow-up Progress Note       NAME:  Minor Cassidy  MEDICAL RECORD NUMBER:  [id number]  AGE:  77 y.o. GENDER:  female  :  1955  TODAY'S DATE:  2021    Subjective:   Wound Identification:  Wound Type: Surgical  Contributing Factors: none        Patient Goal of Care:  [] Wound Healing  [] Odor Control  [] Palliative Care  [] Pain Control   [] Other:     Objective:    BP (!) 148/83   Pulse 78   Temp 98.2 °F (36.8 °C) (Temporal)   Resp 16   Ht 5' 6\" (1.676 m)   Wt 198 lb 0.7 oz (89.8 kg)   SpO2 90%   BMI 31.97 kg/m²   Varinder Risk Score: Varinder Scale Score: 18  Assessment:   Measurements:  Negative Pressure Wound Therapy Leg Right;Upper; Lateral (Active)   $ Standard NPWT >50 sq cm PER TX $ Yes 21 1158   Wound Type Surgical 21 1158   Unit Type 3M KCI 21 1158   Dressing Type Black foam 21 1158   Number of pieces used 4 21 1158   Cycle Continuous 21 1158   Target Pressure (mmHg) 125 21 1158   Dressing Status Changed 21 1158   Dressing Changed Changed/New 21 1158   Drainage Amount Large 21 1158   Drainage Description Serous 21 1158   Dressing Change Due 21 1158   Output (ml) 550 ml 21 1158   Wound Assessment Granulation tissue;Red;Slough 21 1158   Aixa-wound Assessment Intact 21 1158   Shape Oval 21 1158   Odor None 21 1158   Number of days: 11       Wound 21 Leg Right;Lateral;Upper open incision, deep (Active)   Wound Image   21 1158   Wound Etiology Surgical 21 1158   Dressing Status New dressing applied 21 1158   Wound Cleansed Soap and water 21 1158   Dressing/Treatment Negative pressure wound therapy 21 1158   Dressing Change Due 21 1158   Wound Length (cm) 15.5 cm 21 1439   Wound Width (cm) 6.5 cm 04/05/21 1439   Wound Depth (cm) 8.5 cm 04/05/21 1439   Wound Surface Area (cm^2) 100.75 cm^2 04/05/21 1439   Change in Wound Size % (l*w) 35.93 04/05/21 1439   Wound Volume (cm^3) 856.38 cm^3 04/05/21 1439   Wound Healing % 46 04/05/21 1439   Wound Assessment Exposed structure muscle;Granulation tissue;Slough 04/09/21 1158   Drainage Amount Moderate 04/09/21 1158   Drainage Description Serous 04/09/21 1158   Odor None 04/09/21 1158   Aixa-wound Assessment Intact 04/09/21 1158   Margins Attached edges 04/09/21 1158   Wound Thickness Description not for Pressure Injury Full thickness 04/09/21 1158   Number of days: 12       Response to treatment:  Well tolerated by patient., With complaints of pain. Pain Assessment:  Severity:  7 / 10  Quality of pain: burning  Wound Pain Timing/Severity: intermittent  Premedicated: Yes  Plan:   Plan of Care: Wound 03/27/21 Leg Right;Lateral;Upper open incision, deep-Dressing/Treatment: Negative pressure wound therapy    Specialty Bed Required : N/A   [] Low Air Loss   [] Pressure Redistribution  [] Fluid Immersion  [] Bariatric  [] Total Pressure Relief  [] Other:     Current Diet: Dietary Nutrition Supplements: Standard High Calorie Oral Supplement, Wound Healing Oral Supplement  DIET GENERAL; Dental Soft  Dietician consult:  Yes    Discharge Plan:  Placement for patient upon discharge: skilled nursing   Patient appropriate for Outpatient [address]: Yes    Referrals:  []   [] [address] [place] [address]  [] Supplies  [] Other    Patient/Caregiver Teaching:  Level of patient/caregiver understanding able to:   [] Indicates understanding       [] Needs reinforcement  [] Unsuccessful      [] Verbal Understanding  [] Demonstrated understanding       [] No evidence of learning  [] Refused teaching         [x] N/A       Last night there was apparently a leak issue with the wound vac and the night nurse removed the wound vac dressing callum placed a NS wet to dry dressing.  Wound and periwound cleaned with soap and water. Wound flushed with NS. Skin prep applied to periwound skin and wound window-paned with vac drape. Black foam placed to wound bed. All sealed with drape and negative 125 mmHg pressure applied. No leaks or alarms on wound vac. Patient tolerated with more complaints of pain in wound that prior dressing changes. Patient rated pain 7/10 and described it as burning somewhat intermittent and positional.. Patient was previously treated with pain meds. Primary nursing will treat with prn pain meds as ordered when next due.     Electronically signed by Elie Castillo RN, [facility] on 4/9/2021 at 12:29 PM

## 2021-04-09 NOTE — PLAN OF CARE
safety techniques  4/9/2021 0025 by Ector Zavaleta LPN  Outcome: Ongoing  4/8/2021 1138 by Elie Mccartney RN  Outcome: Ongoing  Goal: STG - patient locks brakes on wheelchair  4/9/2021 0025 by Ector Zavaleta LPN  Outcome: Ongoing  4/8/2021 1138 by Elie Mccartney RN  Outcome: Ongoing  Goal: STG - Patient uses call light consistently to request assistance with transfers  4/9/2021 0025 by Ector Zavaleta LPN  Outcome: Ongoing  4/8/2021 1138 by Elie Mccartney RN  Outcome: Ongoing  Goal: STG - patient uses gait belt during all transfers  4/9/2021 0025 by Ector Zavaleta LPN  Outcome: Ongoing  4/8/2021 1138 by Elie Mccartney RN  Outcome: Ongoing     Problem: DAILY CARE  Goal: Daily care needs are met  4/9/2021 0025 by Ector Zavaleta LPN  Outcome: Ongoing  4/8/2021 1138 by Elie Mccartney RN  Outcome: Ongoing     Problem: PAIN  Goal: Patient's pain/discomfort is manageable  4/9/2021 0025 by Ector Zavaleta LPN  Outcome: Ongoing  4/8/2021 1138 by Elie Mccartney RN  Outcome: Ongoing  Goal: STG - pain is manageable through therapies  4/9/2021 0025 by Ector Zavaleta LPN  Outcome: Ongoing  4/8/2021 1138 by Elie Mccartney RN  Outcome: Ongoing  Goal: STG - Patient will verbalize an acceptable level of pain  4/9/2021 0025 by Ector Zavaleta LPN  Outcome: Ongoing  4/8/2021 1138 by Elie Mccartney RN  Outcome: Ongoing  Goal: STG - patients pain is managed to allow active participation in daily activities  4/9/2021 0025 by Ector Zavaleta LPN  Outcome: Ongoing  4/8/2021 1138 by Elie Mccartney RN  Outcome: Ongoing     Problem: SKIN INTEGRITY  Goal: Skin integrity is maintained or improved  4/9/2021 0025 by Ector Zavaleta LPN  Outcome: Ongoing  4/8/2021 1138 by Elie Mccartney RN  Outcome: Ongoing  Goal: LTG - Patient will be free from infection  4/9/2021 0025 by Ector Zavaleta LPN  Outcome: Ongoing  4/8/2021 1138 by Elie Mccartney RN  Outcome: Ongoing  Goal: STG - Patient demonstrates skin care/treatment/dressing change  4/9/2021 0025 by Yanira Franco LPN  Outcome: Ongoing  4/8/2021 1138 by Cisco Wilson RN  Outcome: Ongoing  Goal: STG - patient will maintain good skin integrity  4/9/2021 0025 by Yanira Franco LPN  Outcome: Ongoing  4/8/2021 1138 by Cisco Wislon RN  Outcome: Ongoing     Problem: KNOWLEDGE DEFICIT  Goal: Patient/S.O. demonstrates understanding of disease process, treatment plan, medications, and discharge instructions.   4/9/2021 0025 by Yanira Franco LPN  Outcome: Ongoing  4/8/2021 1138 by Cisco Wilson RN  Outcome: Ongoing     Problem: DISCHARGE BARRIERS  Goal: Patient's continuum of care needs are met  4/9/2021 0025 by Yanira Franco LPN  Outcome: Ongoing  4/8/2021 1138 by Cisco Wilson RN  Outcome: Ongoing     Problem: IP BLADDER/VOIDING  Goal: LTG - Patient will utilize adaptive techniques/equipment to complete bladder elimination  4/9/2021 0025 by Yanira Franco LPN  Outcome: Ongoing  4/8/2021 1138 by Cisco Wilson RN  Outcome: Ongoing  Goal: STG - Patient demonstrates no accidents  4/9/2021 0025 by Yanira Franco LPN  Outcome: Ongoing  4/8/2021 1138 by Cisco Wilson RN  Outcome: Ongoing  Goal: STG - patient participates in bladder program by expressing need to void  4/9/2021 0025 by Yanira Franco LPN  Outcome: Ongoing  4/8/2021 1138 by Cisco Wilson RN  Outcome: Ongoing     Problem: IP BOWEL ELIMINATION  Goal: LTG - patient will have regular and routine bowel evacuation  4/9/2021 0025 by Yanira Franco LPN  Outcome: Ongoing  4/8/2021 1138 by Cisco Wilson RN  Outcome: Ongoing     Problem: IP BREATHING  Goal: STG - respiratory rate and effort will be within normal limits for the patient  4/9/2021 0025 by Yanira Franco LPN  Outcome: Ongoing  4/8/2021 1138 by Cisco Wilson RN  Outcome: Ongoing     Problem: NUTRITION  Goal: Patient/Family demonstrates understanding of diet  4/9/2021 0025 by Doug Calderon ALEXA Hemphill  Outcome: Ongoing  4/8/2021 1138 by Lise Severin, RN  Outcome: Ongoing     Problem: Mobility - Impaired:  Goal: Mobility will improve  Description: Mobility will improve  4/9/2021 0025 by Ania Louis LPN  Outcome: Ongoing  4/8/2021 1138 by Lise Severin, RN  Outcome: Ongoing     Problem:  Activity:  Goal: Ability to tolerate increased activity will improve  Description: Ability to tolerate increased activity will improve  4/9/2021 0025 by Ania Louis LPN  Outcome: Ongoing  4/8/2021 1138 by Lise Severin, RN  Outcome: Ongoing     Problem: Nutrition  Goal: Optimal nutrition therapy  4/9/2021 0025 by Ania Louis LPN  Outcome: Ongoing  4/8/2021 1138 by Lise Severin, RN  Outcome: Ongoing     Problem: Bleeding:  Goal: Will show no signs and symptoms of excessive bleeding  Description: Will show no signs and symptoms of excessive bleeding  4/9/2021 0025 by Ania Louis LPN  Outcome: Ongoing  4/8/2021 1138 by Lise Severin, RN  Outcome: Ongoing     Problem: Skin Integrity:  Goal: Will show no infection signs and symptoms  Description: Will show no infection signs and symptoms  4/9/2021 0025 by Ania Louis LPN  Outcome: Ongoing  4/8/2021 1138 by Lise Severin, RN  Outcome: Ongoing  Goal: Absence of new skin breakdown  Description: Absence of new skin breakdown  4/9/2021 0025 by Ania Louis LPN  Outcome: Ongoing  4/8/2021 1138 by Lise Severin, RN  Outcome: Ongoing     Problem: Infection - Surgical Site:  Goal: Will show no infection signs and symptoms  Description: Will show no infection signs and symptoms  4/9/2021 0025 by Ania Louis LPN  Outcome: Ongoing  4/8/2021 1138 by Lise Severin, RN  Outcome: Ongoing

## 2021-04-09 NOTE — PROGRESS NOTES
Unable to correct vac dressing from leaking, removed vac dressing and applied normal saline moistened fluffs covered with abd pad and secured with Medipore tape. Pt tolerated well. No s/s distress noted.

## 2021-04-10 PROCEDURE — 6370000000 HC RX 637 (ALT 250 FOR IP): Performed by: PSYCHIATRY & NEUROLOGY

## 2021-04-10 PROCEDURE — 2580000003 HC RX 258: Performed by: PSYCHIATRY & NEUROLOGY

## 2021-04-10 PROCEDURE — 6360000002 HC RX W HCPCS: Performed by: PSYCHIATRY & NEUROLOGY

## 2021-04-10 PROCEDURE — 1180000000 HC REHAB R&B

## 2021-04-10 PROCEDURE — 99232 SBSQ HOSP IP/OBS MODERATE 35: CPT | Performed by: PSYCHIATRY & NEUROLOGY

## 2021-04-10 RX ADMIN — GABAPENTIN 300 MG: 300 CAPSULE ORAL at 21:51

## 2021-04-10 RX ADMIN — GUAIFENESIN 1200 MG: 600 TABLET, EXTENDED RELEASE ORAL at 08:17

## 2021-04-10 RX ADMIN — GABAPENTIN 100 MG: 100 CAPSULE ORAL at 21:51

## 2021-04-10 RX ADMIN — METOCLOPRAMIDE 5 MG: 5 TABLET ORAL at 05:55

## 2021-04-10 RX ADMIN — TRAMADOL HYDROCHLORIDE 50 MG: 50 TABLET, FILM COATED ORAL at 15:05

## 2021-04-10 RX ADMIN — OXYCODONE 15 MG: 5 TABLET ORAL at 17:05

## 2021-04-10 RX ADMIN — DOCUSATE SODIUM 100 MG: 100 CAPSULE, LIQUID FILLED ORAL at 08:19

## 2021-04-10 RX ADMIN — Medication 2000 MG: at 08:18

## 2021-04-10 RX ADMIN — OXYCODONE 15 MG: 5 TABLET ORAL at 21:51

## 2021-04-10 RX ADMIN — POTASSIUM CHLORIDE 20 MEQ: 1500 TABLET, EXTENDED RELEASE ORAL at 08:18

## 2021-04-10 RX ADMIN — NALOXEGOL OXALATE 25 MG: 12.5 TABLET, FILM COATED ORAL at 08:16

## 2021-04-10 RX ADMIN — MAGNESIUM OXIDE TAB 400 MG (240 MG ELEMENTAL MG) 400 MG: 400 (240 MG) TAB at 08:16

## 2021-04-10 RX ADMIN — METOCLOPRAMIDE 5 MG: 5 TABLET ORAL at 11:11

## 2021-04-10 RX ADMIN — TRAMADOL HYDROCHLORIDE 50 MG: 50 TABLET, FILM COATED ORAL at 21:52

## 2021-04-10 RX ADMIN — Medication 2000 MG: at 00:45

## 2021-04-10 RX ADMIN — TRAMADOL HYDROCHLORIDE 50 MG: 50 TABLET, FILM COATED ORAL at 08:18

## 2021-04-10 RX ADMIN — HEPARIN SODIUM 5000 UNITS: 5000 INJECTION INTRAVENOUS; SUBCUTANEOUS at 21:58

## 2021-04-10 RX ADMIN — SODIUM CHLORIDE, PRESERVATIVE FREE 10 ML: 5 INJECTION INTRAVENOUS at 08:35

## 2021-04-10 RX ADMIN — ISOSORBIDE MONONITRATE 60 MG: 60 TABLET, EXTENDED RELEASE ORAL at 08:19

## 2021-04-10 RX ADMIN — CARVEDILOL 25 MG: 25 TABLET, FILM COATED ORAL at 08:18

## 2021-04-10 RX ADMIN — Medication 2000 MG: at 15:04

## 2021-04-10 RX ADMIN — POLYETHYLENE GLYCOL 3350 17 G: 17 POWDER, FOR SOLUTION ORAL at 08:18

## 2021-04-10 RX ADMIN — GABAPENTIN 100 MG: 100 CAPSULE ORAL at 08:19

## 2021-04-10 RX ADMIN — DOCUSATE SODIUM 50 MG AND SENNOSIDES 8.6 MG 2 TABLET: 8.6; 5 TABLET, FILM COATED ORAL at 15:05

## 2021-04-10 RX ADMIN — OXYCODONE 15 MG: 5 TABLET ORAL at 13:00

## 2021-04-10 RX ADMIN — ISOSORBIDE MONONITRATE 60 MG: 60 TABLET, EXTENDED RELEASE ORAL at 21:52

## 2021-04-10 RX ADMIN — GUAIFENESIN 1200 MG: 600 TABLET, EXTENDED RELEASE ORAL at 21:51

## 2021-04-10 RX ADMIN — PANTOPRAZOLE SODIUM 40 MG: 40 TABLET, DELAYED RELEASE ORAL at 08:17

## 2021-04-10 RX ADMIN — METOCLOPRAMIDE 5 MG: 5 TABLET ORAL at 15:05

## 2021-04-10 RX ADMIN — LEVOTHYROXINE SODIUM 75 MCG: 75 TABLET ORAL at 05:55

## 2021-04-10 RX ADMIN — PREDNISONE 5 MG: 5 TABLET ORAL at 08:17

## 2021-04-10 RX ADMIN — OXYCODONE 15 MG: 5 TABLET ORAL at 08:17

## 2021-04-10 RX ADMIN — SODIUM CHLORIDE, PRESERVATIVE FREE 10 ML: 5 INJECTION INTRAVENOUS at 21:58

## 2021-04-10 RX ADMIN — CARVEDILOL 25 MG: 25 TABLET, FILM COATED ORAL at 17:05

## 2021-04-10 RX ADMIN — Medication 1 TABLET: at 08:17

## 2021-04-10 RX ADMIN — DOCUSATE SODIUM 50 MG AND SENNOSIDES 8.6 MG 2 TABLET: 8.6; 5 TABLET, FILM COATED ORAL at 08:17

## 2021-04-10 RX ADMIN — DOCUSATE SODIUM 100 MG: 100 CAPSULE, LIQUID FILLED ORAL at 21:52

## 2021-04-10 RX ADMIN — ACYCLOVIR 400 MG: 200 CAPSULE ORAL at 08:17

## 2021-04-10 RX ADMIN — HEPARIN SODIUM 5000 UNITS: 5000 INJECTION INTRAVENOUS; SUBCUTANEOUS at 08:17

## 2021-04-10 RX ADMIN — CYCLOBENZAPRINE 10 MG: 10 TABLET, FILM COATED ORAL at 09:51

## 2021-04-10 RX ADMIN — ACYCLOVIR 400 MG: 200 CAPSULE ORAL at 21:51

## 2021-04-10 RX ADMIN — FUROSEMIDE 40 MG: 40 TABLET ORAL at 08:18

## 2021-04-10 ASSESSMENT — PAIN SCALES - GENERAL
PAINLEVEL_OUTOF10: 5
PAINLEVEL_OUTOF10: 8

## 2021-04-10 NOTE — PROGRESS NOTES
Patient:   Kandy Mazariegos  MR#:    [id number]   Room:    [facility]/820-   YOB: 1955  Date of Progress Note: 4/10/2021  Time of Note                           11:09 AM  Consulting Physician:   Kalen Belle M.D. Attending Physician:  Kalen Belle MD     CHIEF COMPLAINT: Generalized weakness and deconditioning     Subjective  This 77 y.o. female  admitted to [facility] initially on 1/14/2021. The patient had been in her usual state of health when she developed a torn right gluteal muscle. She underwent surgical repair on 1/14/21 and post-operative course was progressing. At her 4 week follow up, she developed increased bloody drainage from the wound with erythema. She presented for injection due to RA at which time she suffered a syncopal event. This was felt to be a vasovagal response and she returned to home. She was found later by a family member in the floor for an unknown length of time and the dressing to the right hip/gluteal wound was saturated. She presented to the hospital 2/8 and was noted to be febrile on intake. Workup revealed an elevated CPK and d dimer. Wound cultures were obtained. The patient was admitted to the service of the hospitalists at that time. She underwent pacemaker interrogation in workup for her syncopal episodes which detected no abnormalities. MRI brain negative for acute process. Purulent drainage from the wound was cultured and the patient was started on broad spectrum antibiotics. She was seen in consultation by orthopedics. Blood cultures returned positive for MSSA in all bottles. ID was consulted for antibiotic management at that time. She underwent debridement of the wound per ortho on 2/9. Post-operatively, the wound measures 20 cm x 6 cm and wound vac therapy was initiated. Surveillance cultures remained positive and surgical wound cultures positive for e. Coli. She continued with antibiotic therapy with Azactam under direction of ID.  Due to persistent bacteremia, she underwent ALFREDA which did not reveal any valvular vegetations. Due to her need for continued IV antibiotic therapy, wound care and rehabilitation, she transferred to the [facility]. She was seen in consultation by our wound care team and was followed by ID. She developed increased complaints of back pain and imaging revealed a right psoas abscess as well as possible L1-2 osteomyelitis discitis. Inflammatory markers were also elevated. The patient had also complained of increased weakness and incoordination to bilateral upper extremities. Neurosurgery was consulted and recommended cervical decompression as well as continued antibiotic therapy with plans for extension of lumbar fusion. She transferred to [facility] on 3/3 and underwent cervical corpectomy and decompression. She tolerated the procedure without difficulty and returned to our facility on 3/6. The patient was initially progressing well with therapy upon her return, but she developed increased right hip pain impeding further progress. Multiple regimen adjustments were undertaken without relief. She continued on antibiotic treatment under the direction of ID and continued wound care under the direction of our wound care team. She developed increased edema to BLE requiring gentle diuresis. Orthopedic surgery was consulted to evaluate the hip who felt it was more likely related to the persistent psoas abscess noted on imaging. Originally, the patient was scheduled for repeat drainage of the abscess on 3/19, but she was going to require sedation and had eaten breakfast. The procedure was then rescheduled for 3/22. Neurosurgery then did an L1-L2 discectomy on 3/23/21 for ongoing discitis osteomyelitis in that region that has thus far not responded to antibiotic therapy. Following I and D of psoas abscess, the patient was discharge from the [facility] to [facility] for planned neurosurgical procedure. Pt tolerated procedure well. She was fitted for a LSO brace.    No new complaints this morning. REVIEW OF SYSTEMS:  Constitutional: No fevers No chills  Neck:No stiffness  Respiratory: No shortness of breath  Cardiovascular: No chest pain No palpitations  Gastrointestinal: No abdominal pain    Genitourinary: No Dysuria  Neurological: No headache, no confusion      PHYSICAL EXAM:  /71   Pulse 79   Temp 97.8 °F (36.6 °C) (Temporal)   Resp 18   Ht 5' 6\" (1.676 m)   Wt 198 lb 0.7 oz (89.8 kg)   SpO2 90%   BMI 31.97 kg/m²     Constitutional: she appears well-developed and well-nourished. Eyes  conjunctiva normal.  Pupils react to light  Ear, nose, throat -hearing intact to voice. No scars, masses, or lesions over external nose or ears, no atrophy of tongue  Neck-symmetric, no masses noted, no jugular vein distension  Respiration- chest wall appears symmetric, good expansion,   normal effort without use of accessory muscles  Cardiovascular- RRR  Musculoskeletal  no significant wasting of muscles noted, no bony deformities, gait no gross ataxia  Extremities-no clubbing, cyanosis or edema  Skin  warm, dry, and intact. No rash, erythema, or pallor. Psychiatric  mood, affect, and behavior appear normal.      Neurology  NEUROLOGICAL EXAM:  Awake, alert, fluent oriented x 3 appropriate affect  Attention and concentration appear appropriate  Recent and remote memory appears unremarkable  Speech normal without dysarthria  No clear issues with language of fund of knowledge     Cranial Nerve Exam   CN II- Visual fields grossly unremarkable  CN III, IV,VI-EOMI, No nystagmus, conjugate eye movements, no ptosis  CN VII-no facial assymetry  CN VIII-Hearing intact        Motor Exam  RA deformities noted in the hands. There is mild generalized weakness and right hip weakness which is partly antalgic.           Tremors- no tremors in hands or head noted     Gait  Not tested      Nursing/pcp notes, imaging,labs and vitals reviewed.      PT,OT and/or speech notes reviewed    Lab Results   Component Value Date    WBC 7.8 04/08/2021    HGB 9.5 (L) 04/08/2021    HCT 32.3 (L) 04/08/2021    MCV 95.0 04/08/2021     04/08/2021     Lab Results   Component Value Date     04/08/2021    K 4.6 04/08/2021    CL 98 04/08/2021    CO2 33 (H) 04/08/2021    BUN 13 04/08/2021    CREATININE 0.5 04/08/2021    GLUCOSE 84 04/08/2021    CALCIUM 8.9 04/08/2021    PROT 5.4 (L) 03/29/2021    LABALBU 2.5 (L) 03/29/2021    BILITOT 0.5 03/29/2021    ALKPHOS 102 03/29/2021    AST 11 03/29/2021    ALT <5 (A) 03/29/2021    LABGLOM >60 04/08/2021    GFRAA >59 04/08/2021     Lab Results   Component Value Date    INR 1.20 (H) 12/05/2018    INR 1.14 09/04/2018    INR 1.08 07/10/2018     Lab Results   Component Value Date    CRP 3.19 (H) 04/08/2021 04/09/21 1000   Pre Treatment Pain Screening   Intervention List Patient able to continue with treatment   General   Additional Pertinent Hx 1/14 torn R gluteal repair,2/18 pacemaker interrogation workup--no abnormalities, 2/9 R gluteal wound debridement with wound vac initiation, psoas abscess--[facility] admit soon after, 3/3 and underwent cervical corpectomy and decompression, returned to [facility] on 3/6,L1-L2 discectomy on 3/23/21   Pain Assessment   Patient Currently in Pain Yes   Pain Assessment 0-10   Pain Level 4   Pain Type Chronic pain   Pain Location Hip;Back   Pain Orientation Lower; Upper   Pain Descriptors Aching   Pain Frequency Intermittent   Clinical Progression Gradually improving   Response to Pain Intervention Patient Satisfied   Balance   Sitting Balance Independent   Standing Balance Supervision   Functional Mobility   Functional - Mobility Device Rolling Walker  (and w/c.)   Activity To/from bathroom; Retrieve items;Transport items  (To/from bathroom in w/c.)   Assist Level Stand by assistance  (SBA RW, supervision w/c.)   Functional Mobility Comments Attempted light homemaking task from RW level in ADL apartment, but had to complete from w/c level d/t items in the second category. Patient was given 60-90 seconds for each tasks.      Short Term Memory tasks completed. Patient was presented a visual for 30 seconds and instructed to memorize details from the picture. Patient was able to recall details with 90% accuracy.      Recall of information from a paragraph completed to also assess short term memory. Patient was able to recall information with 60% accuracy. Patient had difficulty with word recall 2x during this structured task.     Patient able to recall functional information regarding grocery shopping and cooking activities at home with 90% accuracy.      RECORD REVIEW: Previous medical records, medications were reviewed at today's visit    IMPRESSION:   1. Right psoas abscess and L1/2 discitis/osteomyelitis MSSA with generalized weakness and deconditioning. Continue Ancef for another 8 to 12 weeks most likely. PT/OT for generalized weakness and deconditioning. Continue wound VAC for right gluteal tear/repair  2. GIcontinue with medications for constipation. reglan   3. DVT prophylaxissubcu heparin  4. Rheumatoid arthritislow-dose prednisone  5. Coronary artery diseasecontinue Imdur  6. Hypomagnesemia/hypokalemiareplacement ordered  Appreciate ID assistance  Added 300 mg gabapentin at bedtime to help her get through the night  Prednisone taper continues with plan to keep at 5 mg. Lidoderm patch for costochondritis  Patient remained stable.   Anticipate nursing home transfer on Monday    ELOS:4/12, Monday , snf anticipated

## 2021-04-10 NOTE — PLAN OF CARE
Problem: Falls - Risk of:  Goal: Will remain free from falls  Description: Will remain free from falls  4/10/2021 1422 by Balbir Sloan LPN  Outcome: Ongoing  4/10/2021 0154 by Stephon Rodriguez LPN  Outcome: Ongoing  Goal: Absence of physical injury  Description: Absence of physical injury  4/10/2021 1422 by Balbir Sloan LPN  Outcome: Ongoing  4/10/2021 0154 by Stephno Rodriguez LPN  Outcome: Ongoing     Problem: Pain:  Goal: Pain level will decrease  Description: Pain level will decrease  4/10/2021 1422 by Balbir Sloan LPN  Outcome: Ongoing  4/10/2021 0154 by Stephon Rodriguez LPN  Outcome: Ongoing  Goal: Control of acute pain  Description: Control of acute pain  4/10/2021 1422 by Balbir Sloan LPN  Outcome: Ongoing  4/10/2021 0154 by Stephon Rodriguez LPN  Outcome: Ongoing  Goal: Control of chronic pain  Description: Control of chronic pain  4/10/2021 1422 by Balbir Sloan LPN  Outcome: Ongoing  4/10/2021 0154 by Stephon Rodriguez LPN  Outcome: Ongoing     Problem: SAFETY  Goal: Free from accidental physical injury  4/10/2021 1422 by Balbir Sloan LPN  Outcome: Ongoing  4/10/2021 0154 by Stephon Rodriguez LPN  Outcome: Ongoing  Goal: Free from intentional harm  4/10/2021 1422 by Balbir Sloan LPN  Outcome: Ongoing  4/10/2021 0154 by Stephon Rodriguez LPN  Outcome: Ongoing  Goal: LTG - Patient will demonstrate safety requirements appropriate to situation/environment  4/10/2021 1422 by Balbir Sloan LPN  Outcome: Ongoing  4/10/2021 0154 by Stephon Rodriguez LPN  Outcome: Ongoing  Goal: LTG - patient will utilize safety techniques  4/10/2021 1422 by Balbir Sloan LPN  Outcome: Ongoing  4/10/2021 0154 by Stephon Rodriguez LPN  Outcome: Ongoing  Goal: STG - patient locks brakes on wheelchair  4/10/2021 1422 by Balbir Sloan LPN  Outcome: Ongoing  4/10/2021 0154 by Stephon Rodriguez LPN  Outcome: Ongoing  Goal: STG - Patient uses call light consistently to request assistance with transfers  4/10/2021 1422 by Balbir Sloan, LPN  Outcome: Ongoing  4/10/2021 0154 by Thea Glasgow LPN  Outcome: Ongoing  Goal: STG - patient uses gait belt during all transfers  4/10/2021 1422 by Lieutenant Mccallum, LPN  Outcome: Ongoing  4/10/2021 0154 by Thea Glasgow LPN  Outcome: Ongoing     Problem: DAILY CARE  Goal: Daily care needs are met  4/10/2021 1422 by Lieutenant Mccallum, LPN  Outcome: Ongoing  4/10/2021 0154 by Thea Glasgow LPN  Outcome: Ongoing     Problem: PAIN  Goal: Patient's pain/discomfort is manageable  4/10/2021 1422 by Lieutenant Mccallum, LPN  Outcome: Ongoing  4/10/2021 0154 by Thea Glasgow LPN  Outcome: Ongoing  Goal: STG - pain is manageable through therapies  4/10/2021 1422 by Lieutenant Mccallum, LPN  Outcome: Ongoing  4/10/2021 0154 by Thea Glasgow LPN  Outcome: Ongoing  Goal: STG - Patient will verbalize an acceptable level of pain  4/10/2021 1422 by Lieutenant Mccallum, LPN  Outcome: Ongoing  4/10/2021 0154 by Thea Glasgow, LPN  Outcome: Ongoing  Goal: STG - patients pain is managed to allow active participation in daily activities  4/10/2021 1422 by Lieutenant Mccallum, LPN  Outcome: Ongoing  4/10/2021 0154 by Thea Glasgow LPN  Outcome: Ongoing     Problem: SKIN INTEGRITY  Goal: Skin integrity is maintained or improved  4/10/2021 1422 by Lieutenant Mccallum, LPN  Outcome: Ongoing  4/10/2021 0154 by Thea Glasgow LPN  Outcome: Ongoing  Goal: LTG - Patient will be free from infection  4/10/2021 1422 by Lieutenant Mccallum, LPN  Outcome: Ongoing  4/10/2021 0154 by Thea Glasgow LPN  Outcome: Ongoing  Goal: STG - Patient demonstrates skin care/treatment/dressing change  4/10/2021 1422 by Lieutenant Mccallum, LPN  Outcome: Ongoing  4/10/2021 0154 by Thea Glasgow, LPN  Outcome: Ongoing  Goal: STG - patient will maintain good skin integrity  4/10/2021 1422 by Lieutenant Alessio LPN  Outcome: Ongoing  4/10/2021 0154 by Thea Glasgow LPN  Outcome: Ongoing     Problem: KNOWLEDGE DEFICIT  Goal: Patient/S.O. demonstrates understanding of disease process, treatment plan, medications, and discharge instructions. 4/10/2021 1422 by Cora Su LPN  Outcome: Ongoing  4/10/2021 0154 by Ector Zavaleta LPN  Outcome: Ongoing     Problem: DISCHARGE BARRIERS  Goal: Patient's continuum of care needs are met  4/10/2021 1422 by Cora Su LPN  Outcome: Ongoing  4/10/2021 0154 by Ector Zavaleta LPN  Outcome: Ongoing     Problem: IP BLADDER/VOIDING  Goal: LTG - Patient will utilize adaptive techniques/equipment to complete bladder elimination  4/10/2021 1422 by Cora Su LPN  Outcome: Ongoing  4/10/2021 0154 by Ector Zavaleta LPN  Outcome: Ongoing  Goal: STG - Patient demonstrates no accidents  4/10/2021 1422 by Cora Su LPN  Outcome: Ongoing  4/10/2021 0154 by Ector Zavaleta LPN  Outcome: Ongoing  Goal: STG - patient participates in bladder program by expressing need to void  4/10/2021 1422 by Cora Su LPN  Outcome: Ongoing  4/10/2021 0154 by Ector Zavaleta LPN  Outcome: Ongoing     Problem: IP BOWEL ELIMINATION  Goal: LTG - patient will have regular and routine bowel evacuation  4/10/2021 1422 by Cora Su LPN  Outcome: Ongoing  4/10/2021 0154 by Ector Zavaleta LPN  Outcome: Ongoing     Problem: IP BREATHING  Goal: STG - respiratory rate and effort will be within normal limits for the patient  4/10/2021 1422 by Cora Su LPN  Outcome: Ongoing  4/10/2021 0154 by Ector Zavaleta LPN  Outcome: Ongoing     Problem: NUTRITION  Goal: Patient/Family demonstrates understanding of diet  4/10/2021 1422 by Cora Su LPN  Outcome: Ongoing  4/10/2021 0154 by Ector Zavaleta LPN  Outcome: Ongoing     Problem: Mobility - Impaired:  Goal: Mobility will improve  Description: Mobility will improve  4/10/2021 1422 by Cora Su LPN  Outcome: Ongoing  4/10/2021 0154 by Ector Zavaleta LPN  Outcome: Ongoing     Problem:  Activity:  Goal: Ability to tolerate increased activity will improve  Description: Ability to tolerate increased activity will improve 4/10/2021 1422 by Devyn Marcano LPN  Outcome: Ongoing  4/10/2021 0154 by Tricia Sutton LPN  Outcome: Ongoing     Problem: Nutrition  Goal: Optimal nutrition therapy  4/10/2021 1422 by Devyn Marcano LPN  Outcome: Ongoing  4/10/2021 0154 by Tricia Sutton LPN  Outcome: Ongoing     Problem: Bleeding:  Goal: Will show no signs and symptoms of excessive bleeding  Description: Will show no signs and symptoms of excessive bleeding  4/10/2021 1422 by Devyn Marcano LPN  Outcome: Ongoing  4/10/2021 0154 by Tricia Sutton LPN  Outcome: Ongoing     Problem: Skin Integrity:  Goal: Will show no infection signs and symptoms  Description: Will show no infection signs and symptoms  4/10/2021 1422 by Devyn Marcano LPN  Outcome: Ongoing  4/10/2021 0154 by Tricia Sutton LPN  Outcome: Ongoing  Goal: Absence of new skin breakdown  Description: Absence of new skin breakdown  4/10/2021 1422 by Devyn Marcano LPN  Outcome: Ongoing  4/10/2021 0154 by Tricia Sutton LPN  Outcome: Ongoing     Problem: Infection - Surgical Site:  Goal: Will show no infection signs and symptoms  Description: Will show no infection signs and symptoms  4/10/2021 1422 by Devyn Marcano LPN  Outcome: Ongoing  4/10/2021 0154 by Tricia Sutton LPN  Outcome: Ongoing

## 2021-04-10 NOTE — PLAN OF CARE
Outcome: Ongoing     Problem: DISCHARGE BARRIERS  Goal: Patient's continuum of care needs are met  Outcome: Ongoing     Problem: IP BLADDER/VOIDING  Goal: LTG - Patient will utilize adaptive techniques/equipment to complete bladder elimination  Outcome: Ongoing  Goal: STG - Patient demonstrates no accidents  Outcome: Ongoing  Goal: STG - patient participates in bladder program by expressing need to void  Outcome: Ongoing     Problem: IP BOWEL ELIMINATION  Goal: LTG - patient will have regular and routine bowel evacuation  Outcome: Ongoing     Problem: IP BREATHING  Goal: STG - respiratory rate and effort will be within normal limits for the patient  Outcome: Ongoing     Problem: NUTRITION  Goal: Patient/Family demonstrates understanding of diet  Outcome: Ongoing     Problem: Mobility - Impaired:  Goal: Mobility will improve  Description: Mobility will improve  Outcome: Ongoing     Problem:  Activity:  Goal: Ability to tolerate increased activity will improve  Description: Ability to tolerate increased activity will improve  Outcome: Ongoing     Problem: Nutrition  Goal: Optimal nutrition therapy  Outcome: Ongoing     Problem: Bleeding:  Goal: Will show no signs and symptoms of excessive bleeding  Description: Will show no signs and symptoms of excessive bleeding  Outcome: Ongoing     Problem: Skin Integrity:  Goal: Will show no infection signs and symptoms  Description: Will show no infection signs and symptoms  Outcome: Ongoing  Goal: Absence of new skin breakdown  Description: Absence of new skin breakdown  Outcome: Ongoing     Problem: Infection - Surgical Site:  Goal: Will show no infection signs and symptoms  Description: Will show no infection signs and symptoms  Outcome: Ongoing

## 2021-04-11 LAB — SARS-COV-2, PCR: NOT DETECTED

## 2021-04-11 PROCEDURE — U0005 INFEC AGEN DETEC AMPLI PROBE: HCPCS

## 2021-04-11 PROCEDURE — 6370000000 HC RX 637 (ALT 250 FOR IP): Performed by: PSYCHIATRY & NEUROLOGY

## 2021-04-11 PROCEDURE — U0003 INFECTIOUS AGENT DETECTION BY NUCLEIC ACID (DNA OR RNA); SEVERE ACUTE RESPIRATORY SYNDROME CORONAVIRUS 2 (SARS-COV-2) (CORONAVIRUS DISEASE [COVID-19]), AMPLIFIED PROBE TECHNIQUE, MAKING USE OF HIGH THROUGHPUT TECHNOLOGIES AS DESCRIBED BY CMS-2020-01-R: HCPCS

## 2021-04-11 PROCEDURE — 1180000000 HC REHAB R&B

## 2021-04-11 PROCEDURE — 6360000002 HC RX W HCPCS: Performed by: PSYCHIATRY & NEUROLOGY

## 2021-04-11 PROCEDURE — 2580000003 HC RX 258: Performed by: PSYCHIATRY & NEUROLOGY

## 2021-04-11 PROCEDURE — 99232 SBSQ HOSP IP/OBS MODERATE 35: CPT | Performed by: PSYCHIATRY & NEUROLOGY

## 2021-04-11 RX ADMIN — ACYCLOVIR 400 MG: 200 CAPSULE ORAL at 08:06

## 2021-04-11 RX ADMIN — Medication 2000 MG: at 08:13

## 2021-04-11 RX ADMIN — ACYCLOVIR 400 MG: 200 CAPSULE ORAL at 22:29

## 2021-04-11 RX ADMIN — CARVEDILOL 25 MG: 25 TABLET, FILM COATED ORAL at 08:06

## 2021-04-11 RX ADMIN — MAGNESIUM OXIDE TAB 400 MG (240 MG ELEMENTAL MG) 400 MG: 400 (240 MG) TAB at 08:07

## 2021-04-11 RX ADMIN — FUROSEMIDE 40 MG: 40 TABLET ORAL at 08:07

## 2021-04-11 RX ADMIN — DOCUSATE SODIUM 100 MG: 100 CAPSULE, LIQUID FILLED ORAL at 08:07

## 2021-04-11 RX ADMIN — GABAPENTIN 100 MG: 100 CAPSULE ORAL at 08:06

## 2021-04-11 RX ADMIN — OXYCODONE 15 MG: 5 TABLET ORAL at 12:52

## 2021-04-11 RX ADMIN — OXYCODONE 15 MG: 5 TABLET ORAL at 22:30

## 2021-04-11 RX ADMIN — HEPARIN SODIUM 5000 UNITS: 5000 INJECTION INTRAVENOUS; SUBCUTANEOUS at 08:08

## 2021-04-11 RX ADMIN — Medication 1 TABLET: at 08:07

## 2021-04-11 RX ADMIN — ISOSORBIDE MONONITRATE 60 MG: 60 TABLET, EXTENDED RELEASE ORAL at 08:06

## 2021-04-11 RX ADMIN — OXYCODONE 15 MG: 5 TABLET ORAL at 17:04

## 2021-04-11 RX ADMIN — CARVEDILOL 25 MG: 25 TABLET, FILM COATED ORAL at 17:04

## 2021-04-11 RX ADMIN — DOCUSATE SODIUM 100 MG: 100 CAPSULE, LIQUID FILLED ORAL at 22:31

## 2021-04-11 RX ADMIN — METOCLOPRAMIDE 5 MG: 5 TABLET ORAL at 05:32

## 2021-04-11 RX ADMIN — GABAPENTIN 300 MG: 300 CAPSULE ORAL at 22:31

## 2021-04-11 RX ADMIN — PREDNISONE 5 MG: 5 TABLET ORAL at 08:05

## 2021-04-11 RX ADMIN — GUAIFENESIN 1200 MG: 600 TABLET, EXTENDED RELEASE ORAL at 22:31

## 2021-04-11 RX ADMIN — TRAMADOL HYDROCHLORIDE 50 MG: 50 TABLET, FILM COATED ORAL at 12:52

## 2021-04-11 RX ADMIN — Medication 2000 MG: at 16:15

## 2021-04-11 RX ADMIN — GABAPENTIN 100 MG: 100 CAPSULE ORAL at 22:38

## 2021-04-11 RX ADMIN — SODIUM CHLORIDE, PRESERVATIVE FREE 10 ML: 5 INJECTION INTRAVENOUS at 08:13

## 2021-04-11 RX ADMIN — HEPARIN SODIUM 5000 UNITS: 5000 INJECTION INTRAVENOUS; SUBCUTANEOUS at 22:38

## 2021-04-11 RX ADMIN — ISOSORBIDE MONONITRATE 60 MG: 60 TABLET, EXTENDED RELEASE ORAL at 22:31

## 2021-04-11 RX ADMIN — OXYCODONE 15 MG: 5 TABLET ORAL at 08:05

## 2021-04-11 RX ADMIN — POTASSIUM CHLORIDE 20 MEQ: 1500 TABLET, EXTENDED RELEASE ORAL at 08:07

## 2021-04-11 RX ADMIN — POLYETHYLENE GLYCOL 3350 17 G: 17 POWDER, FOR SOLUTION ORAL at 08:04

## 2021-04-11 RX ADMIN — NALOXEGOL OXALATE 25 MG: 12.5 TABLET, FILM COATED ORAL at 08:07

## 2021-04-11 RX ADMIN — LEVOTHYROXINE SODIUM 75 MCG: 75 TABLET ORAL at 05:32

## 2021-04-11 RX ADMIN — SODIUM CHLORIDE, PRESERVATIVE FREE 10 ML: 5 INJECTION INTRAVENOUS at 22:33

## 2021-04-11 RX ADMIN — TRAMADOL HYDROCHLORIDE 50 MG: 50 TABLET, FILM COATED ORAL at 22:30

## 2021-04-11 RX ADMIN — GUAIFENESIN 1200 MG: 600 TABLET, EXTENDED RELEASE ORAL at 08:06

## 2021-04-11 RX ADMIN — TRAMADOL HYDROCHLORIDE 50 MG: 50 TABLET, FILM COATED ORAL at 08:06

## 2021-04-11 RX ADMIN — PANTOPRAZOLE SODIUM 40 MG: 40 TABLET, DELAYED RELEASE ORAL at 08:06

## 2021-04-11 RX ADMIN — Medication 2000 MG: at 01:09

## 2021-04-11 ASSESSMENT — PAIN SCALES - GENERAL
PAINLEVEL_OUTOF10: 5
PAINLEVEL_OUTOF10: 7
PAINLEVEL_OUTOF10: 5
PAINLEVEL_OUTOF10: 6

## 2021-04-11 ASSESSMENT — PAIN DESCRIPTION - DESCRIPTORS
DESCRIPTORS: ACHING
DESCRIPTORS: ACHING

## 2021-04-11 ASSESSMENT — PAIN DESCRIPTION - ORIENTATION
ORIENTATION: RIGHT;LOWER
ORIENTATION: RIGHT;LOWER

## 2021-04-11 ASSESSMENT — PAIN DESCRIPTION - PAIN TYPE: TYPE: CHRONIC PAIN

## 2021-04-11 ASSESSMENT — PAIN DESCRIPTION - LOCATION: LOCATION: HIP;BACK

## 2021-04-11 ASSESSMENT — PAIN - FUNCTIONAL ASSESSMENT: PAIN_FUNCTIONAL_ASSESSMENT: ACTIVITIES ARE NOT PREVENTED

## 2021-04-11 ASSESSMENT — PAIN DESCRIPTION - ONSET: ONSET: GRADUAL

## 2021-04-11 ASSESSMENT — PAIN DESCRIPTION - FREQUENCY: FREQUENCY: INTERMITTENT

## 2021-04-11 NOTE — PLAN OF CARE
Problem: Falls - Risk of:  Goal: Will remain free from falls  Description: Will remain free from falls  4/11/2021 0247 by Damien Lawson LPN  Outcome: Ongoing  4/10/2021 1422 by Angel Dominguez LPN  Outcome: Ongoing  Goal: Absence of physical injury  Description: Absence of physical injury  4/11/2021 0247 by Damien Lawson LPN  Outcome: Ongoing  4/10/2021 1422 by Angel Dominguez LPN  Outcome: Ongoing     Problem: Pain:  Goal: Pain level will decrease  Description: Pain level will decrease  4/11/2021 0247 by Damien Lawson LPN  Outcome: Ongoing  4/10/2021 1422 by Angel Dominguez LPN  Outcome: Ongoing  Goal: Control of acute pain  Description: Control of acute pain  4/11/2021 0247 by Damien Lawson LPN  Outcome: Ongoing  4/10/2021 1422 by Angel Dominguez LPN  Outcome: Ongoing  Goal: Control of chronic pain  Description: Control of chronic pain  4/11/2021 0247 by Damien Lawson LPN  Outcome: Ongoing  4/10/2021 1422 by Angel Dominguez LPN  Outcome: Ongoing     Problem: SAFETY  Goal: Free from accidental physical injury  4/11/2021 0247 by Damien Lawson LPN  Outcome: Ongoing  4/10/2021 1422 by Angel Dominguez LPN  Outcome: Ongoing  Goal: Free from intentional harm  4/11/2021 0247 by Damien Lawson LPN  Outcome: Ongoing  4/10/2021 1422 by Angel Dominguez LPN  Outcome: Ongoing  Goal: LTG - Patient will demonstrate safety requirements appropriate to situation/environment  4/11/2021 0247 by Damien Lawson LPN  Outcome: Ongoing  4/10/2021 1422 by Angel Dominguez LPN  Outcome: Ongoing  Goal: LTG - patient will utilize safety techniques  4/11/2021 0247 by Damien Lawson LPN  Outcome: Ongoing  4/10/2021 1422 by Angel Dominguez LPN  Outcome: Ongoing  Goal: STG - patient locks brakes on wheelchair  4/11/2021 0247 by Damien Lawson LPN  Outcome: Ongoing  4/10/2021 1422 by Angel Dominguez LPN  Outcome: Ongoing  Goal: STG - Patient uses call light consistently to request assistance with transfers  4/11/2021 0247 by Damien Lawson, LPN  Outcome: Ongoing  4/10/2021 1422 by Eliza Desouza LPN  Outcome: Ongoing  Goal: STG - patient uses gait belt during all transfers  4/11/2021 0247 by Omar Feliz LPN  Outcome: Ongoing  4/10/2021 1422 by Eliza Desouza LPN  Outcome: Ongoing     Problem: DAILY CARE  Goal: Daily care needs are met  4/11/2021 0247 by Omar Feliz LPN  Outcome: Ongoing  4/10/2021 1422 by Eliza Desouza LPN  Outcome: Ongoing     Problem: PAIN  Goal: Patient's pain/discomfort is manageable  4/11/2021 0247 by Omar Feliz LPN  Outcome: Ongoing  4/10/2021 1422 by Eliza Desouza LPN  Outcome: Ongoing  Goal: STG - pain is manageable through therapies  4/11/2021 0247 by Omar Feliz LPN  Outcome: Ongoing  4/10/2021 1422 by Eliza Desouza LPN  Outcome: Ongoing  Goal: STG - Patient will verbalize an acceptable level of pain  4/11/2021 0247 by Omar Feliz LPN  Outcome: Ongoing  4/10/2021 1422 by Eliza Desouza LPN  Outcome: Ongoing  Goal: STG - patients pain is managed to allow active participation in daily activities  4/11/2021 0247 by Omar Feliz LPN  Outcome: Ongoing  4/10/2021 1422 by Eliza Desouza LPN  Outcome: Ongoing     Problem: SKIN INTEGRITY  Goal: Skin integrity is maintained or improved  4/11/2021 0247 by Omar Feliz LPN  Outcome: Ongoing  4/10/2021 1422 by Eliza Desouza LPN  Outcome: Ongoing  Goal: LTG - Patient will be free from infection  4/11/2021 0247 by Omar Feliz LPN  Outcome: Ongoing  4/10/2021 1422 by Eliza Desouza LPN  Outcome: Ongoing  Goal: STG - Patient demonstrates skin care/treatment/dressing change  4/11/2021 0247 by Omar Feliz LPN  Outcome: Ongoing  4/10/2021 1422 by Eliza Desouza LPN  Outcome: Ongoing  Goal: STG - patient will maintain good skin integrity  4/11/2021 0247 by Omar Feliz LPN  Outcome: Ongoing  4/10/2021 1422 by Eliza Desouza LPN  Outcome: Ongoing     Problem: KNOWLEDGE DEFICIT  Goal: Patient/S.O. demonstrates understanding of disease process, treatment plan, medications, and discharge instructions. 4/11/2021 0247 by Ector Zavaleta LPN  Outcome: Ongoing  4/10/2021 1422 by Cora Su LPN  Outcome: Ongoing     Problem: DISCHARGE BARRIERS  Goal: Patient's continuum of care needs are met  4/11/2021 0247 by Ector Zavaleta LPN  Outcome: Ongoing  4/10/2021 1422 by Cora Su LPN  Outcome: Ongoing     Problem: IP BLADDER/VOIDING  Goal: LTG - Patient will utilize adaptive techniques/equipment to complete bladder elimination  4/11/2021 0247 by Ector Zavaleta LPN  Outcome: Ongoing  4/10/2021 1422 by Cora Su LPN  Outcome: Ongoing  Goal: STG - Patient demonstrates no accidents  4/11/2021 0247 by Ector Zavaleta LPN  Outcome: Ongoing  4/10/2021 1422 by Cora Su LPN  Outcome: Ongoing  Goal: STG - patient participates in bladder program by expressing need to void  4/11/2021 0247 by Ector Zavaleta LPN  Outcome: Ongoing  4/10/2021 1422 by Cora Su LPN  Outcome: Ongoing     Problem: IP BOWEL ELIMINATION  Goal: LTG - patient will have regular and routine bowel evacuation  4/11/2021 0247 by Ector Zavaleta LPN  Outcome: Ongoing  4/10/2021 1422 by Cora Su LPN  Outcome: Ongoing     Problem: IP BREATHING  Goal: STG - respiratory rate and effort will be within normal limits for the patient  4/11/2021 0247 by Ector Zavaleta LPN  Outcome: Ongoing  4/10/2021 1422 by Cora Su LPN  Outcome: Ongoing     Problem: NUTRITION  Goal: Patient/Family demonstrates understanding of diet  4/11/2021 0247 by Ector Zavaleta LPN  Outcome: Ongoing  4/10/2021 1422 by Cora Su LPN  Outcome: Ongoing     Problem: Mobility - Impaired:  Goal: Mobility will improve  Description: Mobility will improve  4/11/2021 0247 by Ector Zavaleta LPN  Outcome: Ongoing  4/10/2021 1422 by Cora Su LPN  Outcome: Ongoing     Problem:  Activity:  Goal: Ability to tolerate increased activity will improve  Description: Ability to tolerate increased activity will improve 4/11/2021 0247 by Sotero Haines LPN  Outcome: Ongoing  4/10/2021 1422 by Abebe Vázquez LPN  Outcome: Ongoing     Problem: Nutrition  Goal: Optimal nutrition therapy  4/11/2021 0247 by Sotero Haines LPN  Outcome: Ongoing  4/10/2021 1422 by Abebe Vázquez LPN  Outcome: Ongoing     Problem: Bleeding:  Goal: Will show no signs and symptoms of excessive bleeding  Description: Will show no signs and symptoms of excessive bleeding  4/11/2021 0247 by Sotero Haines LPN  Outcome: Ongoing  4/10/2021 1422 by Abebe Vázquez LPN  Outcome: Ongoing     Problem: Skin Integrity:  Goal: Will show no infection signs and symptoms  Description: Will show no infection signs and symptoms  4/11/2021 0247 by Sotero Haines LPN  Outcome: Ongoing  4/10/2021 1422 by Abebe Vázquez LPN  Outcome: Ongoing  Goal: Absence of new skin breakdown  Description: Absence of new skin breakdown  4/11/2021 0247 by Sotero Haines LPN  Outcome: Ongoing  4/10/2021 1422 by Abebe Vázquez LPN  Outcome: Ongoing     Problem: Infection - Surgical Site:  Goal: Will show no infection signs and symptoms  Description: Will show no infection signs and symptoms  4/11/2021 0247 by Sotero Haines LPN  Outcome: Ongoing  4/10/2021 1422 by Abebe Vázquez LPN  Outcome: Ongoing

## 2021-04-11 NOTE — PROGRESS NOTES
Patient:   Alie Varma  MR#:    [id number]   Room:    0820/820-01   YOB: 1955  Date of Progress Note: 4/11/2021  Time of Note                           9:26 AM  Consulting Physician:   Jennifer Ellison M.D. Attending Physician:  Jennifer Ellison MD     CHIEF COMPLAINT: Generalized weakness and deconditioning     Subjective  This 77 y.o. female  admitted to [facility] initially on 1/14/2021. The patient had been in her usual state of health when she developed a torn right gluteal muscle. She underwent surgical repair on 1/14/21 and post-operative course was progressing. At her 4 week follow up, she developed increased bloody drainage from the wound with erythema. She presented for injection due to RA at which time she suffered a syncopal event. This was felt to be a vasovagal response and she returned to home. She was found later by a family member in the floor for an unknown length of time and the dressing to the right hip/gluteal wound was saturated. She presented to the hospital 2/8 and was noted to be febrile on intake. Workup revealed an elevated CPK and d dimer. Wound cultures were obtained. The patient was admitted to the service of the hospitalists at that time. She underwent pacemaker interrogation in workup for her syncopal episodes which detected no abnormalities. MRI brain negative for acute process. Purulent drainage from the wound was cultured and the patient was started on broad spectrum antibiotics. She was seen in consultation by orthopedics. Blood cultures returned positive for MSSA in all bottles. ID was consulted for antibiotic management at that time. She underwent debridement of the wound per ortho on 2/9. Post-operatively, the wound measures 20 cm x 6 cm and wound vac therapy was initiated. Surveillance cultures remained positive and surgical wound cultures positive for e. Coli. She continued with antibiotic therapy with Azactam under direction of ID.  Due to persistent bacteremia, she underwent ALFREDA which did not reveal any valvular vegetations. Due to her need for continued IV antibiotic therapy, wound care and rehabilitation, she transferred to the [facility]. She was seen in consultation by our wound care team and was followed by ID. She developed increased complaints of back pain and imaging revealed a right psoas abscess as well as possible L1-2 osteomyelitis discitis. Inflammatory markers were also elevated. The patient had also complained of increased weakness and incoordination to bilateral upper extremities. Neurosurgery was consulted and recommended cervical decompression as well as continued antibiotic therapy with plans for extension of lumbar fusion. She transferred to [facility] on 3/3 and underwent cervical corpectomy and decompression. She tolerated the procedure without difficulty and returned to our facility on 3/6. The patient was initially progressing well with therapy upon her return, but she developed increased right hip pain impeding further progress. Multiple regimen adjustments were undertaken without relief. She continued on antibiotic treatment under the direction of ID and continued wound care under the direction of our wound care team. She developed increased edema to BLE requiring gentle diuresis. Orthopedic surgery was consulted to evaluate the hip who felt it was more likely related to the persistent psoas abscess noted on imaging. Originally, the patient was scheduled for repeat drainage of the abscess on 3/19, but she was going to require sedation and had eaten breakfast. The procedure was then rescheduled for 3/22. Neurosurgery then did an L1-L2 discectomy on 3/23/21 for ongoing discitis osteomyelitis in that region that has thus far not responded to antibiotic therapy. Following I and D of psoas abscess, the patient was discharge from the [facility] to [facility] for planned neurosurgical procedure. Pt tolerated procedure well. She was fitted for a LSO brace.    No new complaints today except for some low back pain with sitting. Questions answered. REVIEW OF SYSTEMS:  Constitutional: No fevers No chills  Neck:No stiffness  Respiratory: No shortness of breath  Cardiovascular: No chest pain No palpitations  Gastrointestinal: No abdominal pain    Genitourinary: No Dysuria  Neurological: No headache, no confusion      PHYSICAL EXAM:  BP (!) 150/69   Pulse 80   Temp 97.2 °F (36.2 °C) (Temporal)   Resp 18   Ht 5' 6\" (1.676 m)   Wt 198 lb (89.8 kg)   SpO2 90%   BMI 31.96 kg/m²     Constitutional: she appears well-developed and well-nourished. Eyes  conjunctiva normal.  Pupils react to light  Ear, nose, throat -hearing intact to voice. No scars, masses, or lesions over external nose or ears, no atrophy of tongue  Neck-symmetric, no masses noted, no jugular vein distension  Respiration- chest wall appears symmetric, good expansion,   normal effort without use of accessory muscles  Cardiovascular- RRR  Musculoskeletal  no significant wasting of muscles noted, no bony deformities, gait no gross ataxia  Extremities-no clubbing, cyanosis or edema  Skin  warm, dry, and intact. No rash, erythema, or pallor. Psychiatric  mood, affect, and behavior appear normal.      Neurology  NEUROLOGICAL EXAM:  Awake, alert, fluent oriented x 3 appropriate affect  Attention and concentration appear appropriate  Recent and remote memory appears unremarkable  Speech normal without dysarthria  No clear issues with language of fund of knowledge     Cranial Nerve Exam   CN II- Visual fields grossly unremarkable  CN III, IV,VI-EOMI, No nystagmus, conjugate eye movements, no ptosis  CN VII-no facial assymetry  CN VIII-Hearing intact        Motor Exam  RA deformities noted in the hands.   There is mild generalized weakness and right hip weakness which is partly antalgic.           Tremors- no tremors in hands or head noted     Gait  Not tested      Nursing/pcp notes, imaging,labs and vitals reviewed. PT,OT and/or speech notes reviewed    Lab Results   Component Value Date    WBC 7.8 04/08/2021    HGB 9.5 (L) 04/08/2021    HCT 32.3 (L) 04/08/2021    MCV 95.0 04/08/2021     04/08/2021     Lab Results   Component Value Date     04/08/2021    K 4.6 04/08/2021    CL 98 04/08/2021    CO2 33 (H) 04/08/2021    BUN 13 04/08/2021    CREATININE 0.5 04/08/2021    GLUCOSE 84 04/08/2021    CALCIUM 8.9 04/08/2021    PROT 5.4 (L) 03/29/2021    LABALBU 2.5 (L) 03/29/2021    BILITOT 0.5 03/29/2021    ALKPHOS 102 03/29/2021    AST 11 03/29/2021    ALT <5 (A) 03/29/2021    LABGLOM >60 04/08/2021    GFRAA >59 04/08/2021     Lab Results   Component Value Date    INR 1.20 (H) 12/05/2018    INR 1.14 09/04/2018    INR 1.08 07/10/2018     Lab Results   Component Value Date    CRP 3.19 (H) 04/08/2021 04/09/21 1000   Pre Treatment Pain Screening   Intervention List Patient able to continue with treatment   General   Additional Pertinent Hx 1/14 torn R gluteal repair,2/18 pacemaker interrogation workup--no abnormalities, 2/9 R gluteal wound debridement with wound vac initiation, psoas abscess--[facility] admit soon after, 3/3 and underwent cervical corpectomy and decompression, returned to [facility] on 3/6,L1-L2 discectomy on 3/23/21   Pain Assessment   Patient Currently in Pain Yes   Pain Assessment 0-10   Pain Level 4   Pain Type Chronic pain   Pain Location Hip;Back   Pain Orientation Lower; Upper   Pain Descriptors Aching   Pain Frequency Intermittent   Clinical Progression Gradually improving   Response to Pain Intervention Patient Satisfied   Balance   Sitting Balance Independent   Standing Balance Supervision   Functional Mobility   Functional - Mobility Device Rolling Walker  (and w/c.)   Activity To/from bathroom; Retrieve items;Transport items  (To/from bathroom in w/c.)   Assist Level Stand by assistance  (SBA RW, supervision w/c.)   Functional Mobility Comments Attempted light homemaking task from RW level in ADL apartment, but had to complete from w/c level d/t increasing pain in hip and back. Demonstrated good safety throughout task. Bed mobility   Sit to Supine Minimal assistance   Transfers   Sit to stand Supervision   Stand to sit Supervision   Assessment   Performance deficits / Impairments Decreased high-level IADLs;Decreased functional mobility ; Decreased endurance;Decreased ADL status; Decreased balance;Decreased strength;Decreased coordination;Decreased sensation   Treatment Diagnosis R Gluteal abscess, recent cervical decompression, MSSA on long-term antibiotics due to recent discitis,, L1-2 discectomy   Prognosis Good   Timed Code Treatment Minutes 60 Minutes   Activity Tolerance   Activity Tolerance Patient Tolerated treatment well   Safety Devices   Safety Devices in place Yes   Type of devices Call light within reach; Bed alarm in place   Plan   Current Treatment Recommendations Self-Care / ADL; Safety Education & Training;Pain Management; Endurance Training;Functional Mobility Training;Strengthening;Patient/Caregiver Education & Training;Home Management Training;Equipment Evaluation, Education, & procurement;Balance Training;Positioning        04/09/21 1000   Toileting Hygiene   Assistance Needed Supervision or touching assistance   CARE Score 4        04/09/21 1430   Restrictions/Precautions   Restrictions/Precautions Fall Risk;Weight Bearing   Lower Extremity Weight Bearing Restrictions   Right Lower Extremity Weight Bearing Weight Bearing As Tolerated   Left Lower Extremity Weight Bearing Weight Bearing As Tolerated   Required Braces or Orthoses   Cervical c-collar   Spinal Lumbar Corset   Spinal Other LSO   Position Activity Restriction   Spinal Precautions No Bending; No Lifting; No Twisting   Pain Assessment   Pain Assessment 0-10   Pain Level 6   Patient's Stated Pain Goal No pain   Pain Type Chronic pain   Pain Location Back   Pain Orientation Lower; Upper   Pain Descriptors Aching   Pain Frequency

## 2021-04-11 NOTE — PLAN OF CARE
Problem: Falls - Risk of:  Goal: Will remain free from falls  Description: Will remain free from falls  4/11/2021 1019 by Alec Bustamante RN  Outcome: Ongoing  4/11/2021 0247 by Geoff Au LPN  Outcome: Ongoing   Up with 1 assist with walker

## 2021-04-12 VITALS
DIASTOLIC BLOOD PRESSURE: 69 MMHG | RESPIRATION RATE: 16 BRPM | BODY MASS INDEX: 31.82 KG/M2 | HEART RATE: 76 BPM | TEMPERATURE: 97.7 F | SYSTOLIC BLOOD PRESSURE: 136 MMHG | WEIGHT: 198 LBS | HEIGHT: 66 IN | OXYGEN SATURATION: 96 %

## 2021-04-12 LAB
ANION GAP SERPL CALCULATED.3IONS-SCNC: 8 MMOL/L (ref 7–19)
BASOPHILS ABSOLUTE: 0.1 K/UL (ref 0–0.2)
BASOPHILS RELATIVE PERCENT: 0.8 % (ref 0–1)
BUN BLDV-MCNC: 12 MG/DL (ref 8–23)
CALCIUM SERPL-MCNC: 9.3 MG/DL (ref 8.8–10.2)
CHLORIDE BLD-SCNC: 94 MMOL/L (ref 98–111)
CO2: 33 MMOL/L (ref 22–29)
CREAT SERPL-MCNC: 0.6 MG/DL (ref 0.5–0.9)
EOSINOPHILS ABSOLUTE: 0.2 K/UL (ref 0–0.6)
EOSINOPHILS RELATIVE PERCENT: 3.2 % (ref 0–5)
GFR AFRICAN AMERICAN: >59
GFR NON-AFRICAN AMERICAN: >60
GLUCOSE BLD-MCNC: 92 MG/DL (ref 74–109)
HCT VFR BLD CALC: 34.2 % (ref 37–47)
HEMOGLOBIN: 10.2 G/DL (ref 12–16)
IMMATURE GRANULOCYTES #: 0.2 K/UL
LYMPHOCYTES ABSOLUTE: 2.2 K/UL (ref 1.1–4.5)
LYMPHOCYTES RELATIVE PERCENT: 33.3 % (ref 20–40)
MCH RBC QN AUTO: 28.4 PG (ref 27–31)
MCHC RBC AUTO-ENTMCNC: 29.8 G/DL (ref 33–37)
MCV RBC AUTO: 95.3 FL (ref 81–99)
MONOCYTES ABSOLUTE: 0.8 K/UL (ref 0–0.9)
MONOCYTES RELATIVE PERCENT: 12.9 % (ref 0–10)
NEUTROPHILS ABSOLUTE: 3.1 K/UL (ref 1.5–7.5)
NEUTROPHILS RELATIVE PERCENT: 47.2 % (ref 50–65)
PDW BLD-RTO: 17.7 % (ref 11.5–14.5)
PLATELET # BLD: 220 K/UL (ref 130–400)
PMV BLD AUTO: 10.5 FL (ref 9.4–12.3)
POTASSIUM REFLEX MAGNESIUM: 4.3 MMOL/L (ref 3.5–5)
RBC # BLD: 3.59 M/UL (ref 4.2–5.4)
SODIUM BLD-SCNC: 135 MMOL/L (ref 136–145)
WBC # BLD: 6.5 K/UL (ref 4.8–10.8)

## 2021-04-12 PROCEDURE — 85025 COMPLETE CBC W/AUTO DIFF WBC: CPT

## 2021-04-12 PROCEDURE — 2580000003 HC RX 258: Performed by: PSYCHIATRY & NEUROLOGY

## 2021-04-12 PROCEDURE — 6360000002 HC RX W HCPCS: Performed by: PSYCHIATRY & NEUROLOGY

## 2021-04-12 PROCEDURE — 6370000000 HC RX 637 (ALT 250 FOR IP): Performed by: PSYCHIATRY & NEUROLOGY

## 2021-04-12 PROCEDURE — 80048 BASIC METABOLIC PNL TOTAL CA: CPT

## 2021-04-12 PROCEDURE — 36415 COLL VENOUS BLD VENIPUNCTURE: CPT

## 2021-04-12 PROCEDURE — 99239 HOSP IP/OBS DSCHRG MGMT >30: CPT | Performed by: PSYCHIATRY & NEUROLOGY

## 2021-04-12 RX ORDER — CARVEDILOL 25 MG/1
25 TABLET ORAL 2 TIMES DAILY WITH MEALS
Qty: 60 TABLET | Refills: 3
Start: 2021-04-12

## 2021-04-12 RX ORDER — POTASSIUM CHLORIDE 20 MEQ/1
20 TABLET, EXTENDED RELEASE ORAL
Qty: 60 TABLET | Refills: 3 | Status: ON HOLD
Start: 2021-04-12 | End: 2021-10-28

## 2021-04-12 RX ORDER — LANOLIN ALCOHOL/MO/W.PET/CERES
400 CREAM (GRAM) TOPICAL DAILY
Qty: 30 TABLET | Refills: 0 | Status: ON HOLD
Start: 2021-04-12 | End: 2021-10-28

## 2021-04-12 RX ORDER — OXYCODONE HYDROCHLORIDE 15 MG/1
15 TABLET ORAL 4 TIMES DAILY
Qty: 120 TABLET | Refills: 0 | Status: SHIPPED | OUTPATIENT
Start: 2021-04-12 | End: 2021-05-12

## 2021-04-12 RX ORDER — PREDNISONE 1 MG/1
5 TABLET ORAL DAILY
Qty: 10 TABLET | Refills: 0
Start: 2021-04-12 | End: 2021-04-22

## 2021-04-12 RX ORDER — TRAMADOL HYDROCHLORIDE 50 MG/1
50 TABLET ORAL 3 TIMES DAILY
Qty: 90 TABLET | Refills: 0 | Status: SHIPPED | OUTPATIENT
Start: 2021-04-12 | End: 2021-05-12

## 2021-04-12 RX ORDER — GABAPENTIN 300 MG/1
300 CAPSULE ORAL NIGHTLY
Qty: 90 CAPSULE | Refills: 3 | Status: ON HOLD | OUTPATIENT
Start: 2021-04-12 | End: 2021-10-28

## 2021-04-12 RX ORDER — GABAPENTIN 100 MG/1
100 CAPSULE ORAL EVERY 12 HOURS
Qty: 90 CAPSULE | Refills: 3 | Status: ON HOLD | OUTPATIENT
Start: 2021-04-12 | End: 2021-10-28

## 2021-04-12 RX ADMIN — Medication 2000 MG: at 09:37

## 2021-04-12 RX ADMIN — Medication 2000 MG: at 02:16

## 2021-04-12 RX ADMIN — NALOXEGOL OXALATE 25 MG: 12.5 TABLET, FILM COATED ORAL at 09:43

## 2021-04-12 RX ADMIN — GABAPENTIN 100 MG: 100 CAPSULE ORAL at 09:45

## 2021-04-12 RX ADMIN — OXYCODONE 15 MG: 5 TABLET ORAL at 13:07

## 2021-04-12 RX ADMIN — MAGNESIUM OXIDE TAB 400 MG (240 MG ELEMENTAL MG) 400 MG: 400 (240 MG) TAB at 09:44

## 2021-04-12 RX ADMIN — OXYCODONE 15 MG: 5 TABLET ORAL at 09:45

## 2021-04-12 RX ADMIN — FUROSEMIDE 40 MG: 40 TABLET ORAL at 09:44

## 2021-04-12 RX ADMIN — ISOSORBIDE MONONITRATE 60 MG: 60 TABLET, EXTENDED RELEASE ORAL at 09:43

## 2021-04-12 RX ADMIN — ACYCLOVIR 400 MG: 200 CAPSULE ORAL at 09:44

## 2021-04-12 RX ADMIN — DOCUSATE SODIUM 100 MG: 100 CAPSULE, LIQUID FILLED ORAL at 09:44

## 2021-04-12 RX ADMIN — PANTOPRAZOLE SODIUM 40 MG: 40 TABLET, DELAYED RELEASE ORAL at 09:43

## 2021-04-12 RX ADMIN — HEPARIN SODIUM 5000 UNITS: 5000 INJECTION INTRAVENOUS; SUBCUTANEOUS at 09:47

## 2021-04-12 RX ADMIN — CARVEDILOL 25 MG: 25 TABLET, FILM COATED ORAL at 09:44

## 2021-04-12 RX ADMIN — Medication 1 TABLET: at 09:43

## 2021-04-12 RX ADMIN — PREDNISONE 5 MG: 5 TABLET ORAL at 09:45

## 2021-04-12 RX ADMIN — LEVOTHYROXINE SODIUM 75 MCG: 75 TABLET ORAL at 06:38

## 2021-04-12 RX ADMIN — GUAIFENESIN 1200 MG: 600 TABLET, EXTENDED RELEASE ORAL at 09:45

## 2021-04-12 RX ADMIN — POTASSIUM CHLORIDE 20 MEQ: 1500 TABLET, EXTENDED RELEASE ORAL at 09:44

## 2021-04-12 RX ADMIN — DOCUSATE SODIUM 50 MG AND SENNOSIDES 8.6 MG 2 TABLET: 8.6; 5 TABLET, FILM COATED ORAL at 09:44

## 2021-04-12 RX ADMIN — SODIUM CHLORIDE, PRESERVATIVE FREE 10 ML: 5 INJECTION INTRAVENOUS at 02:17

## 2021-04-12 RX ADMIN — TRAMADOL HYDROCHLORIDE 50 MG: 50 TABLET, FILM COATED ORAL at 13:07

## 2021-04-12 RX ADMIN — TRAMADOL HYDROCHLORIDE 50 MG: 50 TABLET, FILM COATED ORAL at 09:45

## 2021-04-12 ASSESSMENT — PAIN DESCRIPTION - LOCATION: LOCATION: HIP;NECK

## 2021-04-12 ASSESSMENT — PAIN DESCRIPTION - PAIN TYPE: TYPE: SURGICAL PAIN

## 2021-04-12 ASSESSMENT — PAIN DESCRIPTION - ORIENTATION: ORIENTATION: RIGHT

## 2021-04-12 ASSESSMENT — PAIN SCALES - GENERAL: PAINLEVEL_OUTOF10: 6

## 2021-04-12 NOTE — PROGRESS NOTES
Report called to [facility], LPN with [facility] skilled nursing rehab. Wound vac removed by Wound Care, FIGUEROA Hathaway Heading and dressing applied.       DC with family to [facility]

## 2021-04-12 NOTE — PROGRESS NOTES
[facility]  Follow-up Progress Note       NAME:  Murray Barr  MEDICAL RECORD NUMBER:  [id number]  AGE:  77 y.o. GENDER:  female  :  1955  TODAY'S DATE:  2021    Subjective:   Wound Identification:  Wound Type: Surgical  Contributing Factors: none        Patient Goal of Care:  [] Wound Healing  [] Odor Control  [] Palliative Care  [] Pain Control   [] Other:     Objective:    /69   Pulse 76   Temp 97.7 °F (36.5 °C) (Temporal)   Resp 16   Ht 5' 6\" (1.676 m)   Wt 198 lb (89.8 kg)   SpO2 96%   BMI 31.96 kg/m²   Varinder Risk Score: Varinder Scale Score: 19  Assessment:   Measurements:  Negative Pressure Wound Therapy Leg Right;Upper; Lateral (Active)   $ Standard NPWT >50 sq cm PER TX $ Yes 21 1158   Wound Type Surgical 21 1009   Unit Type 3M KCI 21 1158   Dressing Type Black foam 21 1009   Number of pieces used 4 21 1158   Cycle Continuous 21 1009   Target Pressure (mmHg) 125 21 1009   Dressing Status Changed 21 1158   Dressing Changed Changed/New 21 1158   Drainage Amount Large 21 1158   Drainage Description Serous 21 1158   Dressing Change Due 21 1158   Output (ml) 550 ml 21 1158   Wound Assessment Granulation tissue;Red;Slough 21 1158   Aixa-wound Assessment Intact 21 1158   Shape Oval 21 1158   Odor None 21 1158   Number of days: 14       Wound 21 Leg Right;Lateral;Upper open incision, deep (Active)   Wound Image   21 1158   Wound Etiology Surgical 21 1009   Dressing Status Clean;Dry; Intact 21 1009   Wound Cleansed Soap and water 21 1158   Dressing/Treatment Negative pressure wound therapy 21 1009   Dressing Change Due 21 1158   Wound Length (cm) 15.5 cm 21 1439   Wound Width (cm) 6.5 cm 21 1439 Wound Depth (cm) 8.5 cm 04/05/21 1439   Wound Surface Area (cm^2) 100.75 cm^2 04/05/21 1439   Change in Wound Size % (l*w) 35.93 04/05/21 1439   Wound Volume (cm^3) 856.38 cm^3 04/05/21 1439   Wound Healing % 46 04/05/21 1439   Wound Assessment Exposed structure muscle;Granulation tissue;Slough 04/09/21 1158   Drainage Amount Moderate 04/09/21 1158   Drainage Description Serous 04/11/21 2010   Odor None 04/09/21 1158   Aixa-wound Assessment Intact 04/09/21 1158   Margins Attached edges 04/09/21 1158   Wound Thickness Description not for Pressure Injury Full thickness 04/09/21 1158   Number of days: 15       Response to treatment:  Well tolerated by patient. Pain Assessment:  Severity:  0 / 10  Quality of pain: N/A  Wound Pain Timing/Severity: none  Premedicated: Yes  Plan:   Plan of Care: Wound 03/27/21 Leg Right;Lateral;Upper open incision, deep-Dressing/Treatment: Negative pressure wound therapy    Specialty Bed Required : N/A   [] Low Air Loss   [] Pressure Redistribution  [] Fluid Immersion  [] Bariatric  [] Total Pressure Relief  [] Other:     Current Diet: Dietary Nutrition Supplements: Standard High Calorie Oral Supplement, Wound Healing Oral Supplement  DIET GENERAL; Dental Soft  Dietician consult:  Yes    Discharge Plan:  Placement for patient upon discharge: skilled nursing   Patient appropriate for Outpatient [address]: Yes    Referrals:  []   [] [address] [place] [address]  [] Supplies  [] Other    Patient/Caregiver Teaching:  Level of patient/caregiver understanding able to:   [] Indicates understanding       [] Needs reinforcement  [] Unsuccessful      [] Verbal Understanding  [] Demonstrated understanding       [] No evidence of learning  [] Refused teaching         [] N/A       Wound vac removed for transport to SNF. All foams removed from wound. Wound bed is red granulation. Wound and periwound cleaned with soap and water. Skin prep to periwound skin.   NS moistened roll gauze to wound bed. 1 roll used. Covered with ABD and tape. SNF will re-apply wound vac at facility. Vac [id number] removed and placed in soiled utility.     Electronically signed by Catherine Dove RN, [facility] on 4/12/2021 at 1:41 PM

## 2021-04-12 NOTE — PROGRESS NOTES
Patient pending discharge to [facility] today. Awaiting Dr. Chuyita Lombardi orders for ABX therapy. PICC line in place. Dr. Chuyita Lombardi notified of [address] to be taken off prior to [place], per [facility]. Facility will place own [place] on. Wound Care nurse, Sharif Chase, notified.

## 2021-04-12 NOTE — PROGRESS NOTES
Infectious Diseases Progress Note    Patient:  Marek Dominguez  YOB: 1955  MRN: [id number]   Admit date: 3/26/2021   Admitting Physician: Arina Waterman MD  Primary Care Physician: Viktoriya Burns MD    Chief Complaint/Interval History: She seems to be improving. She has done reasonably well with therapy. She has been walking with a walker. Definitely better pain control and stronger than prior to admission to acute rehab. She indicates she is going to subacute rehab for additional therapy today. She hopes eventually to return home. She has no pain or discomfort at her PICC line site in the left arm. She has had no nausea, diarrhea, or rash. Overall pleased with her progress. In/Out    Intake/Output Summary (Last 24 hours) at 4/12/2021 0756  Last data filed at 4/11/2021 1948  Gross per 24 hour   Intake 720 ml   Output    Net 720 ml     Allergies:    Allergies   Allergen Reactions    Amoxicillin Rash    Lipitor Rash    Niaspan [Niacin Er] Rash    Penicillins Rash    Relafen [Nabumetone] Rash    Zocor [Simvastatin] Rash     Muscle cramps     Current Meds: ceFAZolin (ANCEF) 2000 mg in 0.9% sodium chloride 50 mL IVPB, Q8H  predniSONE (DELTASONE) tablet 5 mg, Daily  magnesium oxide (MAG-OX) tablet 400 mg, Daily  potassium chloride (KLOR-CON M) extended release tablet 20 mEq, Daily with breakfast  gabapentin (NEURONTIN) capsule 300 mg, Nightly  miconazole (MICOTIN) 2 % powder, PRN  traMADol (ULTRAM) tablet 50 mg, TID  sodium chloride flush 0.9 % injection 10 mL, PRN  sodium chloride flush 0.9 % injection 10 mL, BID  oxyCODONE (ROXICODONE) immediate release tablet 15 mg, 4x Daily  furosemide (LASIX) tablet 40 mg, Daily  levothyroxine (SYNTHROID) tablet 75 mcg, Daily  cloNIDine (CATAPRES) tablet 0.1 mg, BID PRN  acyclovir (ZOVIRAX) capsule 400 mg, BID  pantoprazole (PROTONIX) tablet 40 mg, Daily  docusate sodium (COLACE) capsule 100 mg, BID  therapeutic multivitamin-minerals 1 tablet, Daily nitroGLYCERIN (NITROSTAT) SL tablet 0.4 mg, Q5 Min PRN  isosorbide mononitrate (IMDUR) extended release tablet 60 mg, BID  carvedilol (COREG) tablet 25 mg, BID WC  guaiFENesin (MUCINEX) extended release tablet 1,200 mg, BID  bisacodyl (DULCOLAX) suppository 10 mg, Daily PRN  budesonide (PULMICORT) nebulizer suspension 500 mcg, BID PRN  cyclobenzaprine (FLEXERIL) tablet 10 mg, TID PRN  gabapentin (NEURONTIN) capsule 100 mg, Q12H  heparin (porcine) injection 5,000 Units, Q12H  lidocaine 4 % external patch 1 patch, Daily  magnesium hydroxide (MILK OF MAGNESIA) 400 MG/5ML suspension 30 mL, Daily PRN  naloxegol (MOVANTIK) tablet 25 mg, QAM  ondansetron (ZOFRAN) tablet 4 mg, Q6H PRN  polyethylene glycol (GLYCOLAX) packet 17 g, Daily  sennosides-docusate sodium (SENOKOT-S) 8.6-50 MG tablet 2 tablet, BID  acetaminophen (TYLENOL) tablet 650 mg, Q4H PRN  bisacodyl (DULCOLAX) suppository 10 mg, Daily PRN      Review of systems: See HPI    VitalSigns:  /69   Pulse 76   Temp 97.7 °F (36.5 °C) (Temporal)   Resp 16   Ht 5' 6\" (1.676 m)   Wt 198 lb (89.8 kg)   SpO2 96%   BMI 31.96 kg/m²      Physical Exam  Line/IV (left arm PICC) site: No erythema, warmth, induration, or tenderness. Alert, pleasant, smiling, interactive, no distress  Seems to have intact sensation motor strength both lower extremities  She looks stronger. She looks to be moving better. No new findings on exam.  Wound VAC in place right hip. No surrounding erythema or induration.     Lab Results:  CBC:   Recent Labs     04/12/21  0448   WBC 6.5   HGB 10.2*        BMP:  Recent Labs     04/12/21  0448   *   K 4.3   CL 94*   CO2 33*   BUN 12   CREATININE 0.6   GLUCOSE 92     C-reactive protein on April 9, 2021-3.2    CultureResults:  Blood cultures February 8, 2021 through February 10, 2021-MSSA  (Susceptibility outlined below)  Blood cultures February 11, 2021-no growth  BLood cultures February 12, 2021-no growth  Right psoas abscess cultures March 2, 2021-MSSA   Right psoas abscess cultures March 22, 2021-MSSA    Susceptibility testing on blood culture from February 8, 2021:  Susceptibility    Staphylococcus aureus   TEMI   Gentamicin <=0.5 ug/ml Susceptible   Oxacillin 0.5 ug/ml Susceptible   Rifampin <=0.5 ug/ml Susceptible   Vancomycin 1 ug/ml Susceptible     Susceptibility testing on right psoas abscess culture from March 22, 2021:  Susceptibility    Staphylococcus aureus   TEMI   Gentamicin <=0.5 ug/ml Susceptible   Oxacillin 0.5 ug/ml Susceptible   Rifampin <=0.5 ug/ml Susceptible   Vancomycin <=0.5 ug/ml Susceptible     Radiology: None    Additional Studies Reviewed:  None    Impression:  1. Previous MSSA bacteremia under treatment. 2.  L1/L2 discitis/osteomyelitis  3. Psoas abscess-MSSA. Has had 2 previous aspirations. 4.  History of cervical spine stenosis-she underwent decompression about self [place]  5. Had lumbar laminectomy for her discitis/vertebral osteomyelitis  6. Rheumatoid arthritis  7. Previous gluteal muscle tear-she had undergone surgical repair with reattachment. Developed right thigh wound infection post repair. Recommendations:  Given her discitis and vertebral osteomyelitis planning longer course of therapy. Would like to continue her IV cefazolin through 12 weeks of treatment  Have mentioned to her previously. Discussed with her son yesterday. I do have some concerns about her pacemaker remaining in place as well. They understand. Will need follow-up blood cultures following completion of treatment. Feel ongoing subacute rehab will be of benefit for her. Would like to get follow-up MRI scan to make sure the psoas abscess which was aspirated twice previously has hopefully resolved. Would like her to have an MRI scan of the lumbar sacral spine and psoas muscles without and with gadolinium in 1 to 2 weeks.   Okay to coordinate her follow-up appointment with MRI if she would like to be seen in the office that day. If easier for her and the subacute rehab facility, telehealth follow-up acceptable with me. Nursing home antibiotic recommendations as outlined below. Antibiotic recommendations:  1. Diagnosis-MSSA bacteremia, right psoas abscess, L1/L2 discitis/vertebral osteomyelitis, postop wound infection right gluteal muscle tear repair, rheumatoid arthritis  2. IV access-PICC line  3. Madalyn Mustafa MD  Orthopedic Mccoy Curling MD  4. Antibiotic recommendation:  Cefazolin 2 g IV every 8 hours  Last dose of cefazolin May 5, 2021 (12 weeks following clearing of blood cultures)  5. Laboratory monitoring:   Suggest CMP, CBC and CRP every Monday  6. Follow-up appointment with me 1 to 2 weeks following hospital discharge. Okay with me to be in person or telehealth whichever is better for the patient and the subacute rehab facility.     Rachell Wilson MD

## 2021-04-12 NOTE — CARE COORDINATION
Ms. Ag Madden is discharged, transported by her son, Dr. Ag Madden, to go to [facility] and rehabilitation to continue with rehabilitative services, wound care and IV antibiotics. Great efforts made for smooth transition, Dr. Hernandez Catching detailing his plan for IV antibiotics.

## 2021-04-12 NOTE — DISCHARGE SUMMARY
Neurology Discharge Summary     Patient Identification:  Zhao Zhang is a 77 y.o. female. :  1955  Admit Date:  3/26/2021  Discharge date : 21   Attending Provider: Sveta Winston MD     Account Number: [de-identified]                                   Admission Diagnoses:   Discitis [M46.40]    Discharge Diagnoses: Active Problems:    Discitis  Resolved Problems:    * No resolved hospital problems. *      Discharge Medications:    Current Discharge Medication List           Details   oxyCODONE (OXY-IR) 15 MG immediate release tablet Take 1 tablet by mouth 4 times daily for 30 days. Qty: 120 tablet, Refills: 0    Comments: Reduce doses taken as pain becomes manageable  Associated Diagnoses: Discitis of lumbosacral region      traMADol (ULTRAM) 50 MG tablet Take 1 tablet by mouth 3 times daily for 30 days. Qty: 90 tablet, Refills: 0    Comments: Reduce doses taken as pain becomes manageable  Associated Diagnoses: Discitis of lumbosacral region      magnesium oxide (MAG-OX) 400 (240 Mg) MG tablet Take 1 tablet by mouth daily  Qty: 30 tablet, Refills: 0      potassium chloride (KLOR-CON M) 20 MEQ extended release tablet Take 1 tablet by mouth daily (with breakfast)  Qty: 60 tablet, Refills: 3      naloxegol (MOVANTIK) 25 MG TABS tablet Take 1 tablet by mouth every morning  Qty: 30 tablet, Refills: 0              Details   !! gabapentin (NEURONTIN) 300 MG capsule Take 1 capsule by mouth nightly for 30 days. Qty: 90 capsule, Refills: 3    Associated Diagnoses: Discitis of lumbosacral region      !! gabapentin (NEURONTIN) 100 MG capsule Take 1 capsule by mouth every 12 hours for 30 days.   Qty: 90 capsule, Refills: 3    Associated Diagnoses: Discitis of lumbosacral region      carvedilol (COREG) 25 MG tablet Take 1 tablet by mouth 2 times daily (with meals)  Qty: 60 tablet, Refills: 3      predniSONE (DELTASONE) 5 MG tablet Take 1 tablet by mouth daily for 10 days  Qty: 10 tablet, Refills: 0       !! - Potential duplicate medications found. Please discuss with provider.            Details   guaiFENesin (MUCINEX) 600 MG extended release tablet Take 1,200 mg by mouth 2 times daily      bisacodyl (DULCOLAX) 10 MG suppository Place 10 mg rectally daily as needed for Constipation      budesonide (PULMICORT) 0.5 MG/2ML nebulizer suspension Take 1 ampule by nebulization 2 times daily as needed      cyclobenzaprine (FLEXERIL) 10 MG tablet Take 10 mg by mouth 3 times daily as needed for Muscle spasms      Heparin Sodium, Porcine, (HEPARIN, PORCINE,) 5000 UNIT/ML injection Inject 5,000 Units into the skin every 12 hours      lidocaine (LIDODERM) 5 % Place 1 patch onto the skin daily as needed for Pain 12 hours on, 12 hours off.      magnesium hydroxide (MILK OF MAGNESIA CONCENTRATE) 2400 MG/10ML SUSP Take 2,400 mg by mouth daily as needed      naloxone (NARCAN) 0.4 MG/ML injection Infuse 0.1 mg intravenously every 5 minutes as needed      ondansetron (ZOFRAN) 4 MG tablet Take 4 mg by mouth every 6 hours as needed for Nausea or Vomiting      polyethylene glycol (GLYCOLAX) 17 g packet Take 17 g by mouth daily      isosorbide mononitrate (IMDUR) 60 MG extended release tablet TAKE 1 TABLET BY MOUTH TWICE DAILY  Qty: 180 tablet, Refills: 0    Comments: **Patient requests 90 days supply**      nitroGLYCERIN (NITROSTAT) 0.4 MG SL tablet Place 1 tablet under the tongue every 5 minutes as needed for Chest pain  Qty: 25 tablet, Refills: 3    Associated Diagnoses: Essential hypertension      Multiple Vitamins-Minerals (THERAPEUTIC MULTIVITAMIN-MINERALS) tablet Take 1 tablet by mouth daily      docusate sodium (COLACE) 100 MG capsule Take 1 capsule by mouth daily To prevent constipation  Qty: 20 capsule, Refills: 0      valACYclovir (VALTREX) 500 MG tablet Take 1 tablet by mouth daily  Qty: 30 tablet, Refills: 0      pantoprazole (PROTONIX) 40 MG tablet Take 1 tablet by mouth daily  Qty: 30 tablet, Refills: 3      levothyroxine (SYNTHROID) 75 MCG tablet Take 75 mcg by mouth Daily       furosemide (LASIX) 40 MG tablet Take 40 mg by mouth daily. Current Discharge Medication List      STOP taking these medications       dextrose 5 % SOLN 100 mL with ceFAZolin 1 g SOLR 2,000 mg Comments:   Reason for Stopping:         oxyCODONE-acetaminophen (PERCOCET)  MG per tablet Comments:   Reason for Stopping:         labetalol (NORMODYNE;TRANDATE) 5 mg/ml injection Comments:   Reason for Stopping:         methylnaltrexone (RELISTOR) 12 MG/0.6ML SOLN injection Comments:   Reason for Stopping:         ondansetron (ZOFRAN-ODT) 4 MG disintegrating tablet Comments:   Reason for Stopping:         senna-docusate (PERICOLACE) 8.6-50 MG per tablet Comments:   Reason for Stopping:         cloNIDine (CATAPRES) 0.1 MG tablet Comments:   Reason for Stopping:                 Consults:   ID    Hospital Course:  Relatively well during her stay in the rehab unit. She did have a wound VAC placed for her right psoas abscess drainage. Infectious disease followed her. She was maintained on Ancef 2 g IV every 8 hours and this will continue as per ID instructions. She had a variety of aches and pains. She was placed on a higher dose of prednisone for a few days with a taper. Maintenance dose was increased from 2 to 5 mg a day. She had no medical complications. She was placed on metoclopramide for a week or so because of postprandial vomiting. This has been discontinued 24 hours prior to discharge. So far doing okay. Disposition at dischargeimproved/stable        Discharge Instructions     Patient Instructions:    To a non-[facility] facility  Therapy orders: PT and OT   Discharge lab work: none  Code status: Full Code   Activity: activity as tolerated  Diet: Dietary Nutrition Supplements: Standard High Calorie Oral Supplement, Wound Healing Oral Supplement  DIET GENERAL; Dental Soft    Wound Care: as directed  Equipment: as per therapy      Brenda Oliveira MD    At least 35 minutes were spent in discharging the patient

## 2021-04-13 NOTE — DISCHARGE SUMMARY
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 I&D PSOAS ABCESS     Additional Pertinent Hx: RECENT HX OF CX SX, BACTEREMIA    History of present illness: ZPMG 52 y.o. female  admitted to [facility] initially on 2021. The patient had been in her usual state of health when she developed a torn right gluteal muscle. She underwent surgical repair on 21 and post-operative course was progressing.   At her 4 week follow up, she developed increased bloody drainage from the wound with erythema. She presented for injection due to RA at which time she suffered a syncopal event. This was felt to be a vasovagal response and she returned to home. She was found later by a family member in the floor for an unknown length of time and the dressing to the right hip/gluteal wound was saturated.  She presented to the hospital  and was noted to be febrile on intake. Workup revealed an elevated CPK and d dimer. Wound cultures were obtained. The patient was admitted to the service of the hospitalists at that time. She underwent pacemaker interrogation in workup for her syncopal episodes which detected no abnormalities. MRI brain negative for acute process. Purulent drainage from the wound was cultured and the patient was started on broad spectrum antibiotics. She was seen in consultation by orthopedics. Blood cultures returned positive for MSSA in all bottles. ID was consulted for antibiotic management at that time. She underwent debridement of the wound per ortho on . Post-operatively, the wound measures 20 cm x 6 cm and wound vac therapy was initiated. Surveillance cultures remained positive and surgical wound cultures positive for e. Coli. She continued with antibiotic therapy with Azactam under direction of ID. Due to persistent bacteremia, she underwent ALFREDA which did not reveal any valvular vegetations. Due to her need for continued IV antibiotic therapy, wound care and rehabilitation, she transferred to the [facility]. She was seen in consultation by our wound care team and was followed by ID. She developed increased complaints of back pain and imaging revealed a right psoas abscess as well as possible L1-2 osteomyelitis discitis. Inflammatory markers were also elevated. The patient had also complained of increased weakness and incoordination to bilateral upper extremities. Neurosurgery was consulted and recommended cervical decompression as well as continued antibiotic therapy with plans for extension of lumbar fusion. She transferred to [facility] on 3/3 and underwent cervical corpectomy and decompression. She tolerated the procedure without difficulty and returned to our facility on 3/6. The patient was initially progressing well with therapy upon her return, but she developed increased right hip pain impeding further progress. Multiple regimen adjustments were undertaken without relief. She continued on antibiotic treatment under the direction of ID and continued wound care under the direction of our wound care team. She developed increased edema to BLE requiring gentle diuresis. Orthopedic surgery was consulted to evaluate the hip who felt it was more likely related to the persistent psoas abscess noted on imaging. Originally, the patient was scheduled for repeat drainage of the abscess on 3/19, but she was going to require sedation and had eaten breakfast. The procedure was then rescheduled for 3/22.  Neurosurgery then did an L1-L2 discectomy on 3/23/21 for ongoing discitis osteomyelitis in that region that has thus far not responded to antibiotic therapy.  Following I and D of psoas abscess, the patient was discharge from the [facility] to [facility] for planned neurosurgical procedure. Pt tolerated procedure well. She was fitted for a LSO brace. No new complaints today except for some low back pain with sitting. Questions answered. (per neurology note)  CLQT  Attention 159 179-125 Mild   Memory 167 185-155 WNL   Ex Function 21 23-20 Mild   Language 29 37-29 WNL   Visuospatial Skills 78 81-52 Mild   Composite Score 3.4 3.4-2.5 Mild          SHORT TERM GOAL #1:  Goal 1: The patient will provide 15 members in a given category with 90% accuracy and (min)cues in the form of a visual aid, semantic/phonemic cueing, etc.      SHORT TERM GOAL #2:  Goal 2: The patient will demonstrate functional problem solving and safety awareness with 90% verbal,tactile and visual cues for daily living tasks in order to increase safe interaction with environmentand decreased assistance from caregivers    SHORT TERM GOAL #3:  Goal 3: The patient will demonstrate recall of functional information following a/an (immediate, shortterm,long-term) delay with min cues in order to increase functional integration into environment. SHORT TERM GOAL #4:  Goal 4: The patient will recall 8 facts from (sentence, paragraph, several paragraph, page) length material with 90% accuracy and min cueing. Swallowing Short Term Goals  Short-term Goals  Goal 1: The patient will tolerate a dysphagia soft and bite sized diet with thin liquids with minimal overt s/s of aspiration. MET  Goal 2: Staff/caregivers will complete daily oral hygiene to prevent aspiration of bacteria laden secretions. MET  Goal 3: The patient will participate in a modified barium swallow study to futher assess swallow function.  Met      Long-term Goals  Goal 1: Patient will develop functional, cognitive-linguistic-based skills and utilize compensatory strategies tocommunicate wants and needs effectively to different conversational partners, maintain safety and participate socially in functional living environment  Goal 2: Patient

## 2021-04-13 NOTE — DISCHARGE SUMMARY
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 I&D PSOAS ABCESS  Restrictions/Precautions  Restrictions/Precautions: Fall Risk, Weight Bearing  Required Braces or Orthoses?: Yes      Discharge Date:2021    UE Functioning:  BUE AROM WFL     Home Management:  Functional Mobility  Functional - Mobility Device: Rolling Walker(and w/c.)  Activity: To/from bathroom, Retrieve items, Transport items(To/from bathroom in w/c.)  Assist Level: Stand by assistance(SBA RW, supervision w/c.)  Functional Mobility Comments: Attempted light homemaking task from RW level in ADL apartment, but had to complete from w/c level d/t increasing pain in hip and back. Demonstrated good safety throughout task. Adaptive Equipment/DME Status:  Bathroom Equipment: Grab bars in shower, 3-in-1 commode, Shower chair, Toilet raiser  Home Equipment: Rolling walker, Sock aid, Reacher, Long-handled shoehorn      Pain Assessment:  Pain Level: 4  Pain Location: Hip, Back    Remaining Problems:  Decreased high-level IADLs; Decreased functional mobility ; Decreased endurance; Decreased ADL status;  Decreased balance; Decreased strength; Decreased coordination; Decreased sensation    STGs:  Short term goals  Time Frame for Short term goals: 1 week  Short term goal 1: pt wll complete UB dressing with min A  Short term goal 2: pt will complete LB dressing with  min A  Short term goal 3: pt will complete overall bathing with min A  Short term goal 4: pt will complete simple ambulatory home making task with min A  Short term goal 5: pt will don/doff back brace with independence  Short term goal 6: pt will complete 1-2 handed standing task for 3 mins with CGA  Short term goal 7: pt will complete R FM/sensory acts to improve function of hand during ADL task  Short term goal 8: Pt will increase B  strength by 2#. Short term goal 9: Pt will decrease B 9 hole peg test time by 2 seconds.   MET 5/9 Short Term Goals    LTGs:  Long term goals  Time Frame for Long term goals : 2 weeks  Long term goal 1: complete overall dressing with supervision  Long term goal 2: complete overall bathing with supervision  Long term goal 3: complete overall toileting with supervision  Long term goal 4: complete simple ambulatory home making task with supervision  Long term goal 5: complete HEP with independence  MET 4/5 Long Term Goals    Discharge Setting and Recommendations:  [facility]     Discharge Care Scores  Eating: CARE Score: 6  Oral Hygiene: CARE Score: 6  Toileting: CARE Score: 4   Shower/Bathe: CARE Score: 4  Upper Body Dressing: CARE Score: 6  Lower Body Dressing: CARE Score: 4  Footwear: CARE Score: 4  Toilet Transfers: CARE Score: 4  Picking Up Object:  CARE Score: 3    Electronically signed by Phil Mustafa OT on 4/13/21 at 1:44 PM CDT

## 2021-04-15 PROCEDURE — 93296 REM INTERROG EVL PM/IDS: CPT | Performed by: INTERNAL MEDICINE

## 2021-04-15 PROCEDURE — 93294 REM INTERROG EVL PM/LDLS PM: CPT | Performed by: INTERNAL MEDICINE

## 2021-04-28 ENCOUNTER — OFFICE VISIT (OUTPATIENT)
Dept: NEUROSURGERY | Facility: CLINIC | Age: 66
End: 2021-04-28

## 2021-04-28 ENCOUNTER — IMMUNIZATION (OUTPATIENT)
Dept: VACCINE CLINIC | Facility: HOSPITAL | Age: 66
End: 2021-04-28

## 2021-04-28 ENCOUNTER — HOSPITAL ENCOUNTER (OUTPATIENT)
Dept: GENERAL RADIOLOGY | Facility: HOSPITAL | Age: 66
Discharge: HOME | End: 2021-04-28
Admitting: NURSE PRACTITIONER

## 2021-04-28 VITALS — HEIGHT: 66 IN | BODY MASS INDEX: 33.27 KG/M2 | WEIGHT: 207 LBS

## 2021-04-28 DIAGNOSIS — M48.02 SPINAL STENOSIS IN CERVICAL REGION: ICD-10-CM

## 2021-04-28 DIAGNOSIS — M48.02 STENOSIS OF CERVICAL SPINE WITH MYELOPATHY (HCC): ICD-10-CM

## 2021-04-28 DIAGNOSIS — K68.12 ILIOPSOAS ABSCESS (HCC): ICD-10-CM

## 2021-04-28 DIAGNOSIS — E66.9 OBESITY (BMI 30-39.9): ICD-10-CM

## 2021-04-28 DIAGNOSIS — G99.2 STENOSIS OF CERVICAL SPINE WITH MYELOPATHY (HCC): ICD-10-CM

## 2021-04-28 DIAGNOSIS — M46.26 OSTEOMYELITIS OF LUMBAR SPINE (HCC): Primary | ICD-10-CM

## 2021-04-28 PROCEDURE — 0012A: CPT | Performed by: OBSTETRICS & GYNECOLOGY

## 2021-04-28 PROCEDURE — 99024 POSTOP FOLLOW-UP VISIT: CPT | Performed by: NEUROLOGICAL SURGERY

## 2021-04-28 PROCEDURE — 72040 X-RAY EXAM NECK SPINE 2-3 VW: CPT

## 2021-04-28 PROCEDURE — 91301 HC SARSCO02 VAC 100MCG/0.5ML IM: CPT | Performed by: OBSTETRICS & GYNECOLOGY

## 2021-04-28 RX ORDER — MAGNESIUM OXIDE 400 MG/1
400 TABLET ORAL DAILY
COMMUNITY
Start: 2021-04-12 | End: 2023-01-11

## 2021-04-28 RX ORDER — TRAMADOL HYDROCHLORIDE 50 MG/1
50 TABLET ORAL
COMMUNITY
Start: 2021-04-12 | End: 2021-05-12

## 2021-04-28 RX ORDER — OXYCODONE HYDROCHLORIDE 15 MG/1
15 TABLET ORAL
COMMUNITY
Start: 2021-04-12 | End: 2021-05-12

## 2021-04-28 RX ORDER — GABAPENTIN 300 MG/1
300 CAPSULE ORAL NIGHTLY
Status: ON HOLD | COMMUNITY
Start: 2021-04-12 | End: 2021-07-13

## 2021-04-28 RX ORDER — POTASSIUM CHLORIDE 20 MEQ/1
20 TABLET, EXTENDED RELEASE ORAL DAILY
COMMUNITY
Start: 2021-04-12 | End: 2021-10-12 | Stop reason: SDUPTHER

## 2021-05-19 ENCOUNTER — APPOINTMENT (OUTPATIENT)
Dept: MRI IMAGING | Facility: HOSPITAL | Age: 66
End: 2021-05-19

## 2021-05-26 ENCOUNTER — OFFICE VISIT (OUTPATIENT)
Dept: NEUROSURGERY | Facility: CLINIC | Age: 66
End: 2021-05-26

## 2021-05-26 ENCOUNTER — HOSPITAL ENCOUNTER (OUTPATIENT)
Dept: CT IMAGING | Facility: HOSPITAL | Age: 66
Discharge: HOME | End: 2021-05-26

## 2021-05-26 ENCOUNTER — APPOINTMENT (OUTPATIENT)
Dept: MRI IMAGING | Facility: HOSPITAL | Age: 66
End: 2021-05-26

## 2021-05-26 ENCOUNTER — HOSPITAL ENCOUNTER (OUTPATIENT)
Dept: MRI IMAGING | Facility: HOSPITAL | Age: 66
Discharge: HOME | End: 2021-05-26

## 2021-05-26 ENCOUNTER — APPOINTMENT (OUTPATIENT)
Dept: VACCINE CLINIC | Facility: HOSPITAL | Age: 66
End: 2021-05-26

## 2021-05-26 ENCOUNTER — HOSPITAL ENCOUNTER (OUTPATIENT)
Dept: GENERAL RADIOLOGY | Facility: HOSPITAL | Age: 66
Discharge: HOME | End: 2021-05-26

## 2021-05-26 VITALS — BODY MASS INDEX: 33.27 KG/M2 | WEIGHT: 207 LBS | HEIGHT: 66 IN

## 2021-05-26 DIAGNOSIS — K68.12 ILIOPSOAS ABSCESS (HCC): ICD-10-CM

## 2021-05-26 DIAGNOSIS — M46.26 OSTEOMYELITIS OF LUMBAR SPINE (HCC): ICD-10-CM

## 2021-05-26 DIAGNOSIS — M48.02 STENOSIS OF CERVICAL SPINE WITH MYELOPATHY (HCC): ICD-10-CM

## 2021-05-26 DIAGNOSIS — G99.2 STENOSIS OF CERVICAL SPINE WITH MYELOPATHY (HCC): ICD-10-CM

## 2021-05-26 DIAGNOSIS — S22.080A CLOSED WEDGE COMPRESSION FRACTURE OF T12 VERTEBRA, INITIAL ENCOUNTER (HCC): ICD-10-CM

## 2021-05-26 DIAGNOSIS — M80.00XA AGE-RELATED OSTEOPOROSIS WITH CURRENT PATHOLOGICAL FRACTURE, INITIAL ENCOUNTER: ICD-10-CM

## 2021-05-26 DIAGNOSIS — E66.9 OBESITY (BMI 30-39.9): ICD-10-CM

## 2021-05-26 DIAGNOSIS — M48.02 STENOSIS OF CERVICAL SPINE WITH MYELOPATHY (HCC): Primary | ICD-10-CM

## 2021-05-26 DIAGNOSIS — G99.2 STENOSIS OF CERVICAL SPINE WITH MYELOPATHY (HCC): Primary | ICD-10-CM

## 2021-05-26 DIAGNOSIS — M83.2 ADULT OSTEOMALACIA DUE TO MALABSORPTION: ICD-10-CM

## 2021-05-26 DIAGNOSIS — M48.062 SPINAL STENOSIS, LUMBAR REGION, WITH NEUROGENIC CLAUDICATION: ICD-10-CM

## 2021-05-26 PROBLEM — M46.46 DISCITIS, UNSPECIFIED, LUMBAR REGION: Status: RESOLVED | Noted: 2021-03-22 | Resolved: 2021-05-26

## 2021-05-26 PROBLEM — M43.16 SPONDYLOLISTHESIS OF LUMBAR REGION: Status: RESOLVED | Noted: 2018-01-23 | Resolved: 2021-05-26

## 2021-05-26 PROBLEM — M46.46 DISCITIS OF LUMBAR REGION: Status: RESOLVED | Noted: 2021-02-11 | Resolved: 2021-05-26

## 2021-05-26 PROBLEM — M54.40 LUMBAGO OF MULTIPLE SITES IN SPINE WITH SCIATICA: Status: RESOLVED | Noted: 2018-01-17 | Resolved: 2021-05-26

## 2021-05-26 PROBLEM — Z98.890 S/P LUMBAR LAMINECTOMY: Status: RESOLVED | Noted: 2020-12-23 | Resolved: 2021-05-26

## 2021-05-26 PROCEDURE — 0 GADOBENATE DIMEGLUMINE 529 MG/ML SOLUTION: Performed by: NEUROLOGICAL SURGERY

## 2021-05-26 PROCEDURE — 74178 CT ABD&PLV WO CNTR FLWD CNTR: CPT

## 2021-05-26 PROCEDURE — 72158 MRI LUMBAR SPINE W/O & W/DYE: CPT

## 2021-05-26 PROCEDURE — 72100 X-RAY EXAM L-S SPINE 2/3 VWS: CPT

## 2021-05-26 PROCEDURE — A9577 INJ MULTIHANCE: HCPCS | Performed by: NEUROLOGICAL SURGERY

## 2021-05-26 PROCEDURE — 99024 POSTOP FOLLOW-UP VISIT: CPT | Performed by: NEUROLOGICAL SURGERY

## 2021-05-26 PROCEDURE — 99214 OFFICE O/P EST MOD 30 MIN: CPT | Performed by: NEUROLOGICAL SURGERY

## 2021-05-26 PROCEDURE — 25010000002 IOPAMIDOL 61 % SOLUTION: Performed by: NEUROLOGICAL SURGERY

## 2021-05-26 RX ADMIN — IOPAMIDOL 100 ML: 612 INJECTION, SOLUTION INTRAVENOUS at 10:12

## 2021-05-26 RX ADMIN — GADOBENATE DIMEGLUMINE 20 ML: 529 INJECTION, SOLUTION INTRAVENOUS at 09:40

## 2021-05-26 NOTE — PATIENT INSTRUCTIONS
"PATIENT TO CONTINUE TO FOLLOW UP WITH HIS/HER PRIMARY CARE PROVIDER FOR YEARLY PHYSICAL EXAMS TO ENSURE COMPLETE HEALTH MAINTENANCE    https://www.nhlbi.nih.gov/files/docs/public/heart/dash_brief.pdf\">   DASH Eating Plan  DASH stands for Dietary Approaches to Stop Hypertension. The DASH eating plan is a healthy eating plan that has been shown to:  · Reduce high blood pressure (hypertension).  · Reduce your risk for type 2 diabetes, heart disease, and stroke.  · Help with weight loss.  What are tips for following this plan?  Reading food labels  · Check food labels for the amount of salt (sodium) per serving. Choose foods with less than 5 percent of the Daily Value of sodium. Generally, foods with less than 300 milligrams (mg) of sodium per serving fit into this eating plan.  · To find whole grains, look for the word \"whole\" as the first word in the ingredient list.  Shopping  · Buy products labeled as \"low-sodium\" or \"no salt added.\"  · Buy fresh foods. Avoid canned foods and pre-made or frozen meals.  Cooking  · Avoid adding salt when cooking. Use salt-free seasonings or herbs instead of table salt or sea salt. Check with your health care provider or pharmacist before using salt substitutes.  · Do not negrete foods. Cook foods using healthy methods such as baking, boiling, grilling, roasting, and broiling instead.  · Cook with heart-healthy oils, such as olive, canola, avocado, soybean, or sunflower oil.  Meal planning    · Eat a balanced diet that includes:  ? 4 or more servings of fruits and 4 or more servings of vegetables each day. Try to fill one-half of your plate with fruits and vegetables.  ? 6-8 servings of whole grains each day.  ? Less than 6 oz (170 g) of lean meat, poultry, or fish each day. A 3-oz (85-g) serving of meat is about the same size as a deck of cards. One egg equals 1 oz (28 g).  ? 2-3 servings of low-fat dairy each day. One serving is 1 cup (237 mL).  ? 1 serving of nuts, seeds, or beans 5 " times each week.  ? 2-3 servings of heart-healthy fats. Healthy fats called omega-3 fatty acids are found in foods such as walnuts, flaxseeds, fortified milks, and eggs. These fats are also found in cold-water fish, such as sardines, salmon, and mackerel.  · Limit how much you eat of:  ? Canned or prepackaged foods.  ? Food that is high in trans fat, such as some fried foods.  ? Food that is high in saturated fat, such as fatty meat.  ? Desserts and other sweets, sugary drinks, and other foods with added sugar.  ? Full-fat dairy products.  · Do not salt foods before eating.  · Do not eat more than 4 egg yolks a week.  · Try to eat at least 2 vegetarian meals a week.  · Eat more home-cooked food and less restaurant, buffet, and fast food.  Lifestyle  · When eating at a restaurant, ask that your food be prepared with less salt or no salt, if possible.  · If you drink alcohol:  ? Limit how much you use to:  § 0-1 drink a day for women who are not pregnant.  § 0-2 drinks a day for men.  ? Be aware of how much alcohol is in your drink. In the [place] one drink equals one 12 oz bottle of beer (355 mL), one 5 oz glass of wine (148 mL), or one 1½ oz glass of hard liquor (44 mL).  General information  · Avoid eating more than 2,300 mg of salt a day. If you have hypertension, you may need to reduce your sodium intake to 1,500 mg a day.  · Work with your health care provider to maintain a healthy body weight or to lose weight. Ask what an ideal weight is for you.  · Get at least 30 minutes of exercise that causes your heart to beat faster (aerobic exercise) most days of the week. Activities may include walking, swimming, or biking.  · Work with your health care provider or dietitian to adjust your eating plan to your individual calorie needs.  What foods should I eat?  Fruits  All fresh, dried, or frozen fruit. Canned fruit in natural juice (without added sugar).  Vegetables  Fresh or frozen vegetables (raw, steamed, roasted, or  grilled). Low-sodium or reduced-sodium tomato and vegetable juice. Low-sodium or reduced-sodium tomato sauce and tomato paste. Low-sodium or reduced-sodium canned vegetables.  Grains  Whole-grain or whole-wheat bread. Whole-grain or whole-wheat pasta. Brown rice. Oatmeal. Quinoa. Bulgur. Whole-grain and low-sodium cereals. Gila bread. Low-fat, low-sodium crackers. Whole-wheat flour tortillas.  Meats and other proteins  Skinless chicken or turkey. Ground chicken or turkey. Pork with fat trimmed off. Fish and seafood. Egg whites. Dried beans, peas, or lentils. Unsalted nuts, nut butters, and seeds. Unsalted canned beans. Lean cuts of beef with fat trimmed off. Low-sodium, lean precooked or cured meat, such as sausages or meat loaves.  Dairy  Low-fat (1%) or fat-free (skim) milk. Reduced-fat, low-fat, or fat-free cheeses. Nonfat, low-sodium ricotta or cottage cheese. Low-fat or nonfat yogurt. Low-fat, low-sodium cheese.  Fats and oils  Soft margarine without trans fats. Vegetable oil. Reduced-fat, low-fat, or light mayonnaise and salad dressings (reduced-sodium). Canola, safflower, olive, avocado, soybean, and sunflower oils. Avocado.  Seasonings and condiments  Herbs. Spices. Seasoning mixes without salt.  Other foods  Unsalted popcorn and pretzels. Fat-free sweets.  The items listed above may not be a complete list of foods and beverages you can eat. Contact a dietitian for more information.  What foods should I avoid?  Fruits  Canned fruit in a light or heavy syrup. Fried fruit. Fruit in cream or butter sauce.  Vegetables  Creamed or fried vegetables. Vegetables in a cheese sauce. Regular canned vegetables (not low-sodium or reduced-sodium). Regular canned tomato sauce and paste (not low-sodium or reduced-sodium). Regular tomato and vegetable juice (not low-sodium or reduced-sodium). Pickles. Olives.  Grains  Baked goods made with fat, such as croissants, muffins, or some breads. Dry pasta or rice meal packs.  Meats  and other proteins  Fatty cuts of meat. Ribs. Fried meat. Swift. Bologna, salami, and other precooked or cured meats, such as sausages or meat loaves. Fat from the back of a pig (fatback). Bratwurst. Salted nuts and seeds. Canned beans with added salt. Canned or smoked fish. Whole eggs or egg yolks. Chicken or turkey with skin.  Dairy  Whole or 2% milk, cream, and half-and-half. Whole or full-fat cream cheese. Whole-fat or sweetened yogurt. Full-fat cheese. Nondairy creamers. Whipped toppings. Processed cheese and cheese spreads.  Fats and oils  Butter. Stick margarine. Lard. Shortening. Ghee. Swift fat. Tropical oils, such as coconut, palm kernel, or palm oil.  Seasonings and condiments  Onion salt, garlic salt, seasoned salt, table salt, and sea salt. Worcestershire sauce. Tartar sauce. Barbecue sauce. Teriyaki sauce. Soy sauce, including reduced-sodium. Steak sauce. Canned and packaged gravies. Fish sauce. Oyster sauce. Cocktail sauce. Store-bought horseradish. Ketchup. Mustard. Meat flavorings and tenderizers. Bouillon cubes. Hot sauces. Pre-made or packaged marinades. Pre-made or packaged taco seasonings. Relishes. Regular salad dressings.  Other foods  Salted popcorn and pretzels.  The items listed above may not be a complete list of foods and beverages you should avoid. Contact a dietitian for more information.  Where to find more information  · National Heart, Lung, and Blood [place]: www.nhlbi.nih.gov  · American Heart Association: www.heart.org  · Academy of Nutrition and Dietetics: www.eatright.org  · National Kidney Foundation: www.kidney.org  Summary  · The DASH eating plan is a healthy eating plan that has been shown to reduce high blood pressure (hypertension). It may also reduce your risk for type 2 diabetes, heart disease, and stroke.  · When on the DASH eating plan, aim to eat more fresh fruits and vegetables, whole grains, lean proteins, low-fat dairy, and heart-healthy fats.  · With the DASH  eating plan, you should limit salt (sodium) intake to 2,300 mg a day. If you have hypertension, you may need to reduce your sodium intake to 1,500 mg a day.  · Work with your health care provider or dietitian to adjust your eating plan to your individual calorie needs.  This information is not intended to replace advice given to you by your health care provider. Make sure you discuss any questions you have with your health care provider.  Document Revised: 11/20/2020 Document Reviewed: 11/20/2020  Elsevier Patient Education © 2021 [employer]

## 2021-05-26 NOTE — PROGRESS NOTES
Chief complaint:   Chief Complaint   Patient presents with   • Post-op     Patient underwent C6 corpectomy on 03/03/21. States that she is doing great regarding her neck.   • Post-op     Patient underwent lumbar microdiscectomy L1-2 R on 03/23/21. Still complains of primary low back pain. Denies N/T in legs. Difficulty with R leg weakness since having wound surgery on R hip.       Subjective     HPI:   Interval History: Tawny is being evaluated today status post C6 corpectomy on 3/3/2021 and is doing great.  She also underwent an L1/L2 hemilaminectomy with microdiscectomy for infected discitis osteomyelitis on 03/23/2021.  She has some minor back pain today.  She is currently at [facility] and is receiving approximately 2 physical therapy sessions per week.    Currently she is complaining of 5 out of 10 pain.  This is mostly in the lumbosacral and right hip region.  Pain is exacerbated by VAC dressing changes over the right hip.  Her thoracolumbar pain is completely resolved since I saw her last.  She denies any radiation into her feet.  No numbness and tingling in her bilateral feet.  She does have some difficulty with gait particularly with weakness in the right proximal leg.  This gets worse after wound VAC dressing changes.  She has no difficulty with bowel or bladder habits.  She does feel that her left leg has significantly improved since we saw her last.    Review of Systems   HENT: Negative.    Eyes: Negative.    Respiratory: Negative.    Cardiovascular: Negative.    Gastrointestinal: Negative.    Endocrine: Negative.    Genitourinary: Negative.    Musculoskeletal: Positive for back pain and gait problem.   Skin: Negative.    Allergic/Immunologic: Negative.    Neurological: Positive for weakness.   Hematological: Negative.    Psychiatric/Behavioral: Negative.        PFSH:  Past Medical History:   Diagnosis Date   • Arthritis    • Asthma    • Chronic sinusitis    • Coronary artery disease    • Eustachian  tube dysfunction    • Heart disease    • Herpes simplex    • Hyperlipidemia    • Hypertension    • Knee dislocation    • Labral tear of right hip joint    • Laryngitis sicca    • Laryngitis, chronic    • MI (myocardial infarction) (CMS/HCC)    • Otorrhea    • Sensorineural hearing loss    • Sjogren's disease (CMS/HCC)        Past Surgical History:   Procedure Laterality Date   • A-V CARDIAC PACEMAKER INSERTION  2016   • ATRIAL CARDIAC PACEMAKER INSERTION     • CARDIAC CATHETERIZATION     • CATARACT EXTRACTION     • CERVICAL CORPECTOMY N/A 3/3/2021    Procedure: CERVICAL 6 CORPECTOMY WITH TITANIUM CAGE WITH NEURO MONITORING;  Surgeon: Bandar Shea MD;  Location:  [facility] OR;  Service: Neurosurgery;  Laterality: N/A;   • COLONOSCOPY  11/08/2011    One fold in the ascending colon which showed ulcer otherwise normal exam   • COLONOSCOPY  11/12/2004    Normal exam repeat in five years   • CORONARY ANGIOPLASTY WITH STENT PLACEMENT      X 2; 2013 & 2014   • ENDOSCOPY  07/10/2014    Normal exam   • HIP ABDUCTION TENOTOMY BILATERAL Right 1/14/2021    Procedure: RIGHT HIP GLUTEUS MEDLUS / MINIMUS REPAIR, POSSIBLE ACHILLES ALLOGRAFT;  Surgeon: Nino Carlson MD;  Location:  [facility] OR;  Service: Orthopedics;  Laterality: Right;   • INCISION AND DRAINAGE HIP Right 2/9/2021    Procedure: HIP INCISION AND DRAINAGE;  Surgeon: Nino Carlson MD;  Location:  [facility] OR;  Service: Orthopedics;  Laterality: Right;   • JOINT REPLACEMENT     • LUMBAR DISCECTOMY Right 3/23/2021    Procedure: LUMBAR DISCECTOMY MICRO, Lumbar 1/2 right;  Surgeon: Bandar Shea MD;  Location:  [facility] OR;  Service: Neurosurgery;  Laterality: Right;   • LUMBAR FUSION N/A 1/19/2018    Procedure: L3-4,L4-5 DECOMPRESSION, POSTERIOR SPINAL FUSION WITH INSTRUMENTATION;  Surgeon: Fortino Oropeza MD;  Location:  [facility] OR;  Service:    • LUMBAR LAMINECTOMY WITH FUSION Left 1/17/2018    Procedure: LEFT L3-4 L4-5 LATERAL LUMBAR INTERBODY FUSION;  Surgeon:  Fortino Oropeza MD;  Location: [facility];  Service:    • MYRINGOTOMY W/ TUBES  09/04/2014    TUBES NO LONGER IN PLACE   • OTHER SURGICAL HISTORY      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 so hardware was removed and spacers were placed   • REPLACEMENT TOTAL KNEE Right        Objective      Current Outpatient Medications   Medication Sig Dispense Refill   • bisacodyl (DULCOLAX) 10 MG suppository Insert 1 suppository into the rectum Daily As Needed for Constipation.     • budesonide (PULMICORT) 0.5 MG/2ML nebulizer solution Take 2 mL by nebulization 2 (Two) Times a Day As Needed (shortness of air).     • carvedilol (COREG) 25 MG tablet Take 25 mg by mouth 2 (Two) Times a Day With Meals.     • cyclobenzaprine (FLEXERIL) 10 MG tablet Take 1 tablet by mouth 3 (Three) Times a Day As Needed for Muscle Spasms.     • docusate sodium 100 MG capsule Take 1 capsule by mouth 2 (Two) Times a Day.     • furosemide (LASIX) 40 MG tablet Take 40 mg by mouth Daily.     • gabapentin (NEURONTIN) 100 MG capsule Take 1 capsule by mouth Every 12 (Twelve) Hours.     • guaiFENesin (MUCINEX) 600 MG 12 hr tablet Take 1 tablet by mouth Every 12 (Twelve) Hours.     • Heparin Sodium, Porcine, (heparin, porcine,) 5000 UNIT/ML injection Inject 1 mL under the skin into the appropriate area as directed Every 12 (Twelve) Hours. Indications: Prophylaxis of Venous Thromboembolism     • isosorbide mononitrate (IMDUR) 60 MG 24 hr tablet Take 1 tablet by mouth 2 (Two) Times a Day.     • levothyroxine (SYNTHROID, LEVOTHROID) 75 MCG tablet Take 1 tablet by mouth Daily.     • lidocaine (LIDODERM) 5 % Place 1 patch on the skin as directed by provider Daily As Needed for Mild Pain  (back pain). Remove & Discard patch within 12 hours or as directed by MD     • magnesium hydroxide (MILK OF MAGNESIA) 2400 MG/10ML suspension suspension Take 10 mL by mouth Daily As Needed.     • magnesium oxide (MAG-OX) 400 MG tablet Take 400 mg by mouth.     • multivitamin with  "minerals tablet tablet Take 1 tablet by mouth Daily.     • Naloxegol Oxalate (MOVANTIK) 25 MG tablet Take 25 mg by mouth.     • naloxone (NARCAN) 0.4 MG/ML injection Infuse 0.25 mL into a venous catheter Every 5 (Five) Minutes As Needed for Opioid Reversal or Respiratory Depression (see administration instructions).     • nitroglycerin (Nitrostat) 0.4 MG SL tablet Place 1 tablet under the tongue Every 5 (Five) Minutes As Needed for Chest Pain. Take no more than 3 doses in 15 minutes.  12   • ondansetron (ZOFRAN) 4 MG tablet Take 1 tablet by mouth Every 6 (Six) Hours As Needed for Nausea or Vomiting.     • pantoprazole (PROTONIX) 40 MG EC tablet Take 1 tablet by mouth Daily.     • polyethylene glycol (polyethylene glycol) 17 g packet Take 17 g by mouth Daily.     • potassium chloride (K-DUR,KLOR-CON) 20 MEQ CR tablet Take 20 mEq by mouth.     • predniSONE (DELTASONE) 1 MG tablet Take 2 tablets by mouth Daily.     • sennosides-docusate (PERICOLACE) 8.6-50 MG per tablet Take 2 tablets by mouth 2 (Two) Times a Day.     • valACYclovir (VALTREX) 500 MG tablet Take 1 tablet by mouth Daily. Indications: chronic suppressive therapy     • gabapentin (NEURONTIN) 300 MG capsule Take 300 mg by mouth.       No current facility-administered medications for this visit.       Vital Signs  Ht 167.6 cm (66\")   Wt 93.9 kg (207 lb)   BMI 33.41 kg/m²   Physical Exam  Eyes:      Extraocular Movements: EOM normal.      Pupils: Pupils are equal, round, and reactive to light.   Neurological:      Mental Status: She is oriented to person, place, and time.      Deep Tendon Reflexes:      Reflex Scores:       Tricep reflexes are 2+ on the right side and 1+ on the left side.       Bicep reflexes are 2+ on the right side and 1+ on the left side.       Brachioradialis reflexes are 2+ on the right side and 1+ on the left side.       Patellar reflexes are 0 on the right side and 1+ on the left side.       Achilles reflexes are 1+ on the right side " and 1+ on the left side.  Psychiatric:         Speech: Speech normal.       Neurologic Exam     Mental Status   Oriented to person, place, and time.   Attention: normal. Concentration: normal.   Speech: speech is normal   Level of consciousness: alert  Knowledge: good.     Cranial Nerves     CN II   Visual fields full to confrontation.     CN III, IV, VI   Pupils are equal, round, and reactive to light.  Extraocular motions are normal.     CN V   Right facial sensation deficit: none  Left facial sensation deficit: none    CN VII   Facial expression full, symmetric.     CN VIII   Hearing: intact    CN IX, X   Palate: symmetric    CN XI   Right sternocleidomastoid strength: normal  Left sternocleidomastoid strength: normal    CN XII   Tongue deviation: none    Motor Exam     Strength   Right deltoid: 5/5  Left deltoid: 5/5  Right biceps: 5/5  Left biceps: 5/5  Right triceps: 5/5  Left triceps: 5/5  Right interossei: 4/5  Left interossei: 4/5  Right iliopsoas: 1/5  Left iliopsoas: 5/5  Right quadriceps: 4/5  Left quadriceps: 5/5  Right anterior tibial: 5/5  Left anterior tibial: 5/5  Right gastroc: 5/5  Left gastroc: 5/5    Sensory Exam   Right arm light touch: normal  Left arm light touch: normal  Right leg light touch: normal  Left leg light touch: normal    Gait, Coordination, and Reflexes     Gait  Gait: (wheelchair today with max assist)    Reflexes   Right brachioradialis: 2+  Left brachioradialis: 1+  Right biceps: 2+  Left biceps: 1+  Right triceps: 2+  Left triceps: 1+  Right patellar: 0  Left patellar: 1+  Right achilles: 1+  Left achilles: 1+  Right plantar: upgoing  Left plantar: upgoing  Right Mims: absent  Left Mims: absent    Female  strength (pounds)  AGE Right Hand RH Norms Left Hand LH Norms   20-24   70+14.5   61+13.1   25-29   75+13.9   63.5+12   30-34   79+19.2   68+17.7   35-39   74+10.8   66+11.7   40-44   70+13.5   62+13.8   45-49   62+15.1   56+12.7   50-54   66+11.6   57+10.7   55-59    57+12.5   47+11.9   60-64   55+10.1   46+10.1   65-69 >25,27 50+9.7 >22,25 41+8.2   70-74   50+11.7   42+10.2   75+   43+11.0   38+8.9   (COURTNEY Wetzel et al; Hand Dynometer: Effects of trials and sessions.  Perpetual and Motor Skills 61:195-8, 1985)  Key  * = Dominant hand  > = Intervention    (12 bullet pts)    Results Review:   CT Abdomen Pelvis With & Without Contrast    Result Date: 5/26/2021  1. Interval resolution of right psoas abscess without residual inflammation identified when allowing for limitations of streak artifact.  2. Worsening soft tissue defect overlying the right hip with underlying osseous changes suspicious for osteomyelitis.  3. New T12 compression deformity, suspicious for a 2 column fracture, considered potentially unstable. This can be better seen on the MRI of the lumbar spine from the same day. 4. Fecal stasis. 5. Cholelithiasis.    This report was finalized on 05/26/2021 14:29 by Dr. Dillon Flores MD.    XR Spine Lumbar 2 or 3 View    Result Date: 5/26/2021  Progressive endplate destruction at the apposing endplates of L1-L2, presumably related to discitis osteomyelitis. This report was finalized on 05/26/2021 15:59 by Dr. Dillon Flores MD.    MRI Lumbar Spine With & Without Contrast    Result Date: 5/26/2021  1. New (acute or subacute) T12 superior endplate compression deformity with approximately 25% loss of height. 2. L1-L2 level discitis osteomyelitis is improving. In particular, the infiltrative marrow pattern along the L1-L2 endplates is improving, with normalizing fat signal. The abnormal fluid within the disc space is near completely resolved and the right-sided psoas collection is resolved. There is a residual tiny rim-enhancing collection in the anterior epidural space at the level of the L1-L2 disc, with this collection measuring only 4 mm.   This report was finalized on 05/26/2021 16:52 by Dr Riki David, .            Postop MRI of the lumbar spine with and without  contrast.  Good decompression at L1/2.  Small disc protrusion still exist but cord is decompressed.  Facets appear to be intact.      Preop versus postoperative sagittal images of the lumbar spine show a greatly decompressed L1/2 area.  Fluid signal within the disc space is now gone.      STIR signal reveals continued edema within the bone at L1 and L2 consistent with healing osteomyelitis discitis.  However T12 now has acute compression fracture.                    cervical x-rays are reviewed with some mild settling of her anterior plate but stable instrumentation since surgery.           MRI from 3/18/2021 is reviewed. preoperative imaging.  With near-complete canal occlusion at L1./2       CRP = 11.33 dropped to 7.73 and is now nearly normal at 1.61        Assessment/Plan:   Tawny Shin is a 65 y.o. with a significant comorbidity rheumatoid arthritis on disease modifying agents, prior lumbar fusion by Dr. Oropeza in 2018.  She presents for follow up of C6 corpectomy for CSM and L1/2 discectomy for discitis/osteomyelitis.  Her thoracolumbar back pain has resolved since I saw her last.  She has lumbosacral back pain today but improved lower extremity strength and gait.. Physical exam findings of right iliopsoas and quad weakness, and normalization of her reflexes but with persistent Babinski improved  strength bilaterally  and improved lower extremity strength and gait compared to her last visit.      Cervical spondylitic myelopathy  Status post C6 corpectomy (3/3/2021)  Tawny continues to make a significant improvement in  strengths without any exacerbations of neck pain.  Continue with advanced physical therapy and occupational therapy as the patient can tolerate.     Osteomyelitis, discitis L1/2  L1/2 Stenosis secondary to above  S/P L3-L5  instrumented fusion ([facility] '18)  S/P L1/2 hemilaminectomy/discectomy (3/23/21)  Right lower extremity proximal leg weakness  Galzin laboratory markers including CRP  continue to normalize.  MRI shows improvement in the T2 signal within the L1/L2 disc space.  Additionally her CT scan is beginning to show autofusion of L1/2.  Finally supine and upright films show no evidence of instability at L1/2.  Therefore recommend continued conservative care and observation.  My hope is that we will be able to avoid extension of her prior fusion.    Acute to subacute T12 compression fracture  Newly identified compression fracture at T12.  Mainstay of treatment for compression fractures is bracing therapy and activity modification as well as assessment for osteoporosis.  DEXA scan vitamin D, PTH and calcium.  Not a candidate for kyphoplasty given resolution of thoracolumbar pain.  Continue with bracing at this time.    Right iliopsoas abscess, resolved  Today we reviewed her CT of the abdomen with and without contrast.  This shows complete resolution of right iliopsoas abscess.  Some atrophy of the iliopsoas muscle which is most likely secondary to the abscess and disuse secondary to right hip pathology and wound VAC.       Obesity Counseling  We discussed Tawny's current weight and the effect on her spine, including premature dis degeneration and increased anterior force on the lumbar spine resulting in worsening facet arthropathy.  Tawny has a BMI 30.0-34.9        Classification: class I obesity.  We spent 2 minutes in weight management counseling including discussing current weight, current diet, and exercise patterns.  Additional we set goals for weight reduction.  Therefore above normal parameters. Recommendations include: educational material and exercise counseling.         1. Stenosis of cervical spine with myelopathy (CMS/HCC)    2. Spinal stenosis, lumbar region, with neurogenic claudication    3. Osteomyelitis of lumbar spine (CMS/HCC)    4. Iliopsoas abscess (CMS/HCC)    5. Closed wedge compression fracture of T12 vertebra, initial encounter (CMS/[facility])    6. Age-related osteoporosis  with current pathological fracture, initial encounter    7. Obesity (BMI 30-39.9)        Recommendations:  Diagnoses and all orders for this visit:    1. Stenosis of cervical spine with myelopathy (CMS/HCC) (Primary)    2. Spinal stenosis, lumbar region, with neurogenic claudication  -     XR Spine Lumbar 2 or 3 View; Future    3. Osteomyelitis of lumbar spine (CMS/HCC)  -     XR Spine Lumbar 2 or 3 View; Future    4. Iliopsoas abscess (CMS/HCC)    5. Closed wedge compression fracture of T12 vertebra, initial encounter (CMS/[facility])  -     XR Spine Lumbar 2 or 3 View; Future    6. Age-related osteoporosis with current pathological fracture, initial encounter  -     XR Spine Lumbar 2 or 3 View; Future    7. Obesity (BMI 30-39.9)        Return in about 6 months (around 11/26/2021) for symptom check, XR next appointment, with Dr. hSea.    Level of Risk: Moderate due to: undiagnosed new problem  MDM: Moderate  (Mod = 59194, High = 79041)    Thank you, for allowing me to continue to participate in the care of this patient.    Sincerely,  Bandar Shea MD

## 2021-06-07 ENCOUNTER — HOME HEALTH ADMISSION (OUTPATIENT)
Dept: HOME HEALTH SERVICES | Facility: HOME HEALTHCARE | Age: 66
End: 2021-06-07

## 2021-06-07 ENCOUNTER — TELEPHONE (OUTPATIENT)
Dept: INTERNAL MEDICINE | Facility: CLINIC | Age: 66
End: 2021-06-07

## 2021-06-07 ENCOUNTER — TRANSCRIBE ORDERS (OUTPATIENT)
Dept: HOME HEALTH SERVICES | Facility: HOME HEALTHCARE | Age: 66
End: 2021-06-07

## 2021-06-07 DIAGNOSIS — I10 ESSENTIAL HYPERTENSION, MALIGNANT: Primary | ICD-10-CM

## 2021-06-07 DIAGNOSIS — G99.2 STENOSIS OF CERVICAL SPINE WITH MYELOPATHY (HCC): ICD-10-CM

## 2021-06-07 DIAGNOSIS — M48.02 STENOSIS OF CERVICAL SPINE WITH MYELOPATHY (HCC): ICD-10-CM

## 2021-06-07 RX ORDER — GABAPENTIN 100 MG/1
100 CAPSULE ORAL EVERY 12 HOURS SCHEDULED
Start: 2021-06-07

## 2021-06-07 NOTE — TELEPHONE ENCOUNTER
Caller: Tawny Shin    Relationship: Self    Best call back number: [phone]    Medication needed:   Requested Prescriptions     Pending Prescriptions Disp Refills   • gabapentin (NEURONTIN) 100 MG capsule       Sig: Take 1 capsule by mouth Every 12 (Twelve) Hours.     What additional details did the patient provide when requesting the medication: PT ALSO REQUESTING TRAMADOL, BUT NOT ON MEDS LIST    Does the patient have less than a 3 day supply:  [x] Yes  [] No    What is the patient's preferred pharmacy: [facility] [address] [place] [address] 60 - [phone] [facility] [phone] FX

## 2021-06-07 NOTE — TELEPHONE ENCOUNTER
Asking front office to contact pt to schedule hfu this week, as we have not seen her in the last 6 months and has had quite a lot occur since her last visit here.

## 2021-06-08 ENCOUNTER — HOME CARE VISIT (OUTPATIENT)
Dept: HOME HEALTH SERVICES | Facility: CLINIC | Age: 66
End: 2021-06-08

## 2021-06-08 VITALS
SYSTOLIC BLOOD PRESSURE: 122 MMHG | DIASTOLIC BLOOD PRESSURE: 66 MMHG | HEART RATE: 68 BPM | HEIGHT: 66 IN | RESPIRATION RATE: 20 BRPM | TEMPERATURE: 96.9 F | WEIGHT: 181 LBS | BODY MASS INDEX: 29.09 KG/M2

## 2021-06-08 PROCEDURE — G0299 HHS/HOSPICE OF RN EA 15 MIN: HCPCS

## 2021-06-08 NOTE — HOME HEALTH
SOC for 65 year old female s/p post op infection to right hip. SN ordered for three times a week wound vac dressing changes.

## 2021-06-09 RX ORDER — GABAPENTIN 100 MG/1
100 CAPSULE ORAL EVERY 12 HOURS SCHEDULED
Qty: 60 CAPSULE | Refills: 0 | Status: SHIPPED | OUTPATIENT
Start: 2021-06-09 | End: 2021-07-01

## 2021-06-09 NOTE — TELEPHONE ENCOUNTER
RX ATTACHED/PENDED    Home health nurse sent staff message to Dr. Urrutia about refilling these meds and Dr. Urrutia says ok to refill enough till she comes into the office..

## 2021-06-11 ENCOUNTER — HOME CARE VISIT (OUTPATIENT)
Dept: HOME HEALTH SERVICES | Facility: CLINIC | Age: 66
End: 2021-06-11

## 2021-06-11 VITALS
RESPIRATION RATE: 18 BRPM | SYSTOLIC BLOOD PRESSURE: 130 MMHG | HEART RATE: 77 BPM | OXYGEN SATURATION: 94 % | TEMPERATURE: 97.4 F | DIASTOLIC BLOOD PRESSURE: 72 MMHG

## 2021-06-11 PROCEDURE — G0300 HHS/HOSPICE OF LPN EA 15 MIN: HCPCS

## 2021-06-14 ENCOUNTER — HOME CARE VISIT (OUTPATIENT)
Dept: HOME HEALTH SERVICES | Facility: HOME HEALTHCARE | Age: 66
End: 2021-06-14

## 2021-06-14 VITALS
OXYGEN SATURATION: 95 % | TEMPERATURE: 96.9 F | RESPIRATION RATE: 18 BRPM | SYSTOLIC BLOOD PRESSURE: 122 MMHG | HEART RATE: 85 BPM | WEIGHT: 185 LBS | DIASTOLIC BLOOD PRESSURE: 78 MMHG | BODY MASS INDEX: 29.86 KG/M2

## 2021-06-14 PROCEDURE — G0300 HHS/HOSPICE OF LPN EA 15 MIN: HCPCS

## 2021-06-15 NOTE — HOME HEALTH
FOCUS OF CARE/SKILLED NEED: Home safety/fall preventon, medication management, pain mangement, preventative skin care, wound care, infection control.    TEACHING/INTERVENTIONS: Home safety/fall prevention, medication management, pain management, preventative skin care, wound care, infection control.    SHORT TERM GOAL: wound will be free from infection    LONG TERM GOAL: wound will heal    PATIENT'S STATED GOAL(S): I pray this will heal up quickly.    HOME SAFETY ISSUES:    D/C PLAN: Discharge with goals met    PHYSCIAN CONTACT: None    INSURANCE CHANGES: None    Patient alert and oriented, able to make need sknown. Resps even and unlabored on room air. Heart rate rhythmic and strong. BP within patient's normal limits. Patient has a wound vac to her right hip area. performed wound care pr MD order. Removed current dressing, cleansed wound bed wtih normal saline and patted dry. wound bed is beefy red, no slough or eschar noted. Cleansed john wound with normal saline and pattient dry. Periwound is skin color and blanchable. Applied skin protectant to periwound. Applied protective drape to periwound skin making sure to cover to the edge of wound. Applied 1 piece of black foam, cut to size of wound. Applied additional drape over wound. Created quarter sized hole in drape. Applied suction hose to drape. Connected suction hose to canister in wound vac. Wound vac operational at 125mmhh. Applied a small drape to secure suction tubing to edge of wound dressing to help prevent tubing from being pulled out. Patient tolerated well. Discussed s/s of infection, what to watch for. advised patient to eat a protein enrich diet to aid in healing.

## 2021-06-16 ENCOUNTER — OFFICE VISIT (OUTPATIENT)
Dept: INTERNAL MEDICINE | Facility: CLINIC | Age: 66
End: 2021-06-16

## 2021-06-16 ENCOUNTER — HOME CARE VISIT (OUTPATIENT)
Dept: HOME HEALTH SERVICES | Facility: HOME HEALTHCARE | Age: 66
End: 2021-06-16

## 2021-06-16 ENCOUNTER — TELEPHONE (OUTPATIENT)
Dept: INTERNAL MEDICINE | Facility: CLINIC | Age: 66
End: 2021-06-16

## 2021-06-16 VITALS
HEIGHT: 66 IN | TEMPERATURE: 97.8 F | WEIGHT: 194 LBS | HEART RATE: 81 BPM | OXYGEN SATURATION: 95 % | DIASTOLIC BLOOD PRESSURE: 58 MMHG | BODY MASS INDEX: 31.18 KG/M2 | SYSTOLIC BLOOD PRESSURE: 100 MMHG

## 2021-06-16 DIAGNOSIS — M05.79 SEROPOSITIVE RHEUMATOID ARTHRITIS OF MULTIPLE SITES (HCC): Primary | ICD-10-CM

## 2021-06-16 DIAGNOSIS — T81.49XA WOUND INFECTION AFTER SURGERY: Primary | ICD-10-CM

## 2021-06-16 DIAGNOSIS — T81.49XA WOUND INFECTION AFTER SURGERY: ICD-10-CM

## 2021-06-16 DIAGNOSIS — I10 ESSENTIAL HYPERTENSION: ICD-10-CM

## 2021-06-16 PROCEDURE — G0300 HHS/HOSPICE OF LPN EA 15 MIN: HCPCS

## 2021-06-16 PROCEDURE — 99214 OFFICE O/P EST MOD 30 MIN: CPT | Performed by: INTERNAL MEDICINE

## 2021-06-16 RX ORDER — OXYCODONE HYDROCHLORIDE 15 MG/1
15 TABLET ORAL EVERY 6 HOURS PRN
COMMUNITY
End: 2021-06-21 | Stop reason: SDUPTHER

## 2021-06-16 RX ORDER — SALSALATE 500 MG
500 TABLET ORAL DAILY
COMMUNITY
End: 2021-06-21 | Stop reason: SDUPTHER

## 2021-06-16 RX ORDER — TRAMADOL HYDROCHLORIDE 50 MG/1
50 TABLET ORAL 3 TIMES DAILY
Status: ON HOLD | COMMUNITY
End: 2021-07-13 | Stop reason: SDUPTHER

## 2021-06-16 RX ORDER — PROMETHAZINE HYDROCHLORIDE 25 MG/1
25 TABLET ORAL EVERY 6 HOURS PRN
COMMUNITY
End: 2021-07-01

## 2021-06-16 NOTE — TELEPHONE ENCOUNTER
Michelle De La Cruz with [facility] called asking for referral for  for wound care for wound vac at rt hip. Her C# [phone], office T# [phone], F# [phone]

## 2021-06-16 NOTE — TELEPHONE ENCOUNTER
[facility] says they have the wound vac, ordered by SNF, but she needs a referral to Wound Care @ [facility] for FU on the wound.  Ok to refer?

## 2021-06-17 ENCOUNTER — TELEPHONE (OUTPATIENT)
Dept: INTERNAL MEDICINE | Facility: CLINIC | Age: 66
End: 2021-06-17

## 2021-06-17 DIAGNOSIS — D50.9 IRON DEFICIENCY ANEMIA, UNSPECIFIED IRON DEFICIENCY ANEMIA TYPE: Primary | ICD-10-CM

## 2021-06-17 LAB
ALBUMIN SERPL-MCNC: 3 G/DL (ref 3.5–5.2)
ALBUMIN/GLOB SERPL: 1 G/DL
ALP SERPL-CCNC: 100 U/L (ref 39–117)
ALT SERPL-CCNC: 8 U/L (ref 1–33)
AST SERPL-CCNC: 17 U/L (ref 1–32)
BASOPHILS # BLD AUTO: 0.05 10*3/MM3 (ref 0–0.2)
BASOPHILS NFR BLD AUTO: 0.9 % (ref 0–1.5)
BILIRUB SERPL-MCNC: 0.3 MG/DL (ref 0–1.2)
BUN SERPL-MCNC: 15 MG/DL (ref 8–23)
BUN/CREAT SERPL: 17 (ref 7–25)
CALCIUM SERPL-MCNC: 9.3 MG/DL (ref 8.6–10.5)
CHLORIDE SERPL-SCNC: 101 MMOL/L (ref 98–107)
CO2 SERPL-SCNC: 25.8 MMOL/L (ref 22–29)
CREAT SERPL-MCNC: 0.88 MG/DL (ref 0.57–1)
EOSINOPHIL # BLD AUTO: 0.33 10*3/MM3 (ref 0–0.4)
EOSINOPHIL NFR BLD AUTO: 5.8 % (ref 0.3–6.2)
ERYTHROCYTE [DISTWIDTH] IN BLOOD BY AUTOMATED COUNT: 14.4 % (ref 12.3–15.4)
GLOBULIN SER CALC-MCNC: 3 GM/DL
GLUCOSE SERPL-MCNC: 100 MG/DL (ref 65–99)
HCT VFR BLD AUTO: 33.4 % (ref 34–46.6)
HGB BLD-MCNC: 10.4 G/DL (ref 12–15.9)
IMM GRANULOCYTES # BLD AUTO: 0.04 10*3/MM3 (ref 0–0.05)
IMM GRANULOCYTES NFR BLD AUTO: 0.7 % (ref 0–0.5)
LYMPHOCYTES # BLD AUTO: 1.24 10*3/MM3 (ref 0.7–3.1)
LYMPHOCYTES NFR BLD AUTO: 21.7 % (ref 19.6–45.3)
MCH RBC QN AUTO: 29.1 PG (ref 26.6–33)
MCHC RBC AUTO-ENTMCNC: 31.1 G/DL (ref 31.5–35.7)
MCV RBC AUTO: 93.3 FL (ref 79–97)
MONOCYTES # BLD AUTO: 0.7 10*3/MM3 (ref 0.1–0.9)
MONOCYTES NFR BLD AUTO: 12.3 % (ref 5–12)
NEUTROPHILS # BLD AUTO: 3.35 10*3/MM3 (ref 1.7–7)
NEUTROPHILS NFR BLD AUTO: 58.6 % (ref 42.7–76)
NRBC BLD AUTO-RTO: 0 /100 WBC (ref 0–0.2)
PLATELET # BLD AUTO: 284 10*3/MM3 (ref 140–450)
POTASSIUM SERPL-SCNC: 4.9 MMOL/L (ref 3.5–5.2)
PROT SERPL-MCNC: 6 G/DL (ref 6–8.5)
RBC # BLD AUTO: 3.58 10*6/MM3 (ref 3.77–5.28)
SODIUM SERPL-SCNC: 138 MMOL/L (ref 136–145)
WBC # BLD AUTO: 5.71 10*3/MM3 (ref 3.4–10.8)

## 2021-06-17 NOTE — TELEPHONE ENCOUNTER
----- Message from Davon Urrutia MD sent at 6/17/2021  6:49 AM CDT -----  Anemic from recent surgery but improving, recheck cbc in 4 weeks

## 2021-06-18 ENCOUNTER — HOME CARE VISIT (OUTPATIENT)
Dept: HOME HEALTH SERVICES | Facility: CLINIC | Age: 66
End: 2021-06-18

## 2021-06-18 VITALS
RESPIRATION RATE: 18 BRPM | OXYGEN SATURATION: 96 % | DIASTOLIC BLOOD PRESSURE: 60 MMHG | TEMPERATURE: 97.5 F | SYSTOLIC BLOOD PRESSURE: 118 MMHG | HEART RATE: 81 BPM

## 2021-06-18 VITALS
DIASTOLIC BLOOD PRESSURE: 62 MMHG | HEART RATE: 94 BPM | OXYGEN SATURATION: 97 % | TEMPERATURE: 97.2 F | SYSTOLIC BLOOD PRESSURE: 128 MMHG | RESPIRATION RATE: 18 BRPM

## 2021-06-18 PROCEDURE — G0300 HHS/HOSPICE OF LPN EA 15 MIN: HCPCS

## 2021-06-21 ENCOUNTER — HOME CARE VISIT (OUTPATIENT)
Dept: HOME HEALTH SERVICES | Facility: CLINIC | Age: 66
End: 2021-06-21

## 2021-06-21 ENCOUNTER — HOME CARE VISIT (OUTPATIENT)
Dept: HOME HEALTH SERVICES | Facility: HOME HEALTHCARE | Age: 66
End: 2021-06-21

## 2021-06-21 VITALS
RESPIRATION RATE: 18 BRPM | HEART RATE: 82 BPM | TEMPERATURE: 97.5 F | OXYGEN SATURATION: 94 % | DIASTOLIC BLOOD PRESSURE: 58 MMHG | SYSTOLIC BLOOD PRESSURE: 118 MMHG

## 2021-06-21 DIAGNOSIS — M05.79 SEROPOSITIVE RHEUMATOID ARTHRITIS OF MULTIPLE SITES (HCC): Primary | ICD-10-CM

## 2021-06-21 DIAGNOSIS — T81.49XA WOUND INFECTION AFTER SURGERY: ICD-10-CM

## 2021-06-21 PROCEDURE — G0299 HHS/HOSPICE OF RN EA 15 MIN: HCPCS

## 2021-06-21 PROCEDURE — G0157 HHC PT ASSISTANT EA 15: HCPCS

## 2021-06-21 PROCEDURE — G0300 HHS/HOSPICE OF LPN EA 15 MIN: HCPCS

## 2021-06-22 ENCOUNTER — OFFICE VISIT (OUTPATIENT)
Dept: WOUND CARE | Facility: HOSPITAL | Age: 66
End: 2021-06-22

## 2021-06-22 ENCOUNTER — HOSPITAL ENCOUNTER (OUTPATIENT)
Facility: HOSPITAL | Age: 66
Setting detail: OBSERVATION
Discharge: HOME | End: 2021-06-23
Attending: FAMILY MEDICINE | Admitting: INTERNAL MEDICINE

## 2021-06-22 ENCOUNTER — APPOINTMENT (OUTPATIENT)
Dept: CT IMAGING | Facility: HOSPITAL | Age: 66
End: 2021-06-22

## 2021-06-22 ENCOUNTER — LAB REQUISITION (OUTPATIENT)
Dept: LAB | Facility: HOSPITAL | Age: 66
End: 2021-06-22

## 2021-06-22 ENCOUNTER — APPOINTMENT (OUTPATIENT)
Dept: GENERAL RADIOLOGY | Facility: HOSPITAL | Age: 66
End: 2021-06-22

## 2021-06-22 ENCOUNTER — HOME CARE VISIT (OUTPATIENT)
Dept: HOME HEALTH SERVICES | Facility: HOME HEALTHCARE | Age: 66
End: 2021-06-22

## 2021-06-22 VITALS
OXYGEN SATURATION: 96 % | RESPIRATION RATE: 18 BRPM | TEMPERATURE: 96.8 F | DIASTOLIC BLOOD PRESSURE: 62 MMHG | HEART RATE: 89 BPM | SYSTOLIC BLOOD PRESSURE: 120 MMHG

## 2021-06-22 DIAGNOSIS — E78.2 MIXED HYPERLIPIDEMIA: ICD-10-CM

## 2021-06-22 DIAGNOSIS — I10 ESSENTIAL (PRIMARY) HYPERTENSION: ICD-10-CM

## 2021-06-22 DIAGNOSIS — I25.10 ATHEROSCLEROSIS OF NATIVE CORONARY ARTERY WITHOUT ANGINA PECTORIS, UNSPECIFIED WHETHER NATIVE OR TRANSPLANTED HEART: ICD-10-CM

## 2021-06-22 DIAGNOSIS — R77.8 ELEVATED TROPONIN: ICD-10-CM

## 2021-06-22 DIAGNOSIS — Z00.00 ENCOUNTER FOR GENERAL ADULT MEDICAL EXAMINATION WITHOUT ABNORMAL FINDINGS: ICD-10-CM

## 2021-06-22 DIAGNOSIS — S71.001A UNSPECIFIED OPEN WOUND, RIGHT HIP, INITIAL ENCOUNTER: ICD-10-CM

## 2021-06-22 DIAGNOSIS — S06.0X0A CONCUSSION WITHOUT LOSS OF CONSCIOUSNESS, INITIAL ENCOUNTER: Primary | ICD-10-CM

## 2021-06-22 PROBLEM — R60.0 BILATERAL LOWER EXTREMITY EDEMA: Status: ACTIVE | Noted: 2021-06-22
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
## 2021-06-22 PROBLEM — E87.6 HYPOKALEMIA: Status: ACTIVE | Noted: 2021-06-22
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
## 2021-06-22 PROBLEM — G89.4 CHRONIC PAIN SYNDROME: Status: ACTIVE | Noted: 2021-06-22

## 2021-06-22 LAB
ALBUMIN SERPL-MCNC: 2.8 G/DL (ref 3.5–5.2)
ALBUMIN/GLOB SERPL: 0.8 G/DL
ALP SERPL-CCNC: 92 U/L (ref 39–117)
ALT SERPL W P-5'-P-CCNC: 6 U/L (ref 1–33)
ANION GAP SERPL CALCULATED.3IONS-SCNC: 10 MMOL/L (ref 5–15)
APTT PPP: 37.1 SECONDS (ref 24.1–35)
AST SERPL-CCNC: 24 U/L (ref 1–32)
BASOPHILS # BLD AUTO: 0.06 10*3/MM3 (ref 0–0.2)
BASOPHILS NFR BLD AUTO: 1.1 % (ref 0–1.5)
BILIRUB SERPL-MCNC: 0.3 MG/DL (ref 0–1.2)
BUN SERPL-MCNC: 13 MG/DL (ref 8–23)
BUN/CREAT SERPL: 15.1 (ref 7–25)
CALCIUM SPEC-SCNC: 8.6 MG/DL (ref 8.6–10.5)
CHLORIDE SERPL-SCNC: 100 MMOL/L (ref 98–107)
CO2 SERPL-SCNC: 30 MMOL/L (ref 22–29)
CREAT SERPL-MCNC: 0.86 MG/DL (ref 0.57–1)
D DIMER PPP FEU-MCNC: 7.69 MG/L (FEU) (ref 0–0.5)
DEPRECATED RDW RBC AUTO: 50.6 FL (ref 37–54)
EOSINOPHIL # BLD AUTO: 0.28 10*3/MM3 (ref 0–0.4)
EOSINOPHIL NFR BLD AUTO: 5.2 % (ref 0.3–6.2)
ERYTHROCYTE [DISTWIDTH] IN BLOOD BY AUTOMATED COUNT: 15.2 % (ref 12.3–15.4)
GFR SERPL CREATININE-BSD FRML MDRD: 66 ML/MIN/1.73
GLOBULIN UR ELPH-MCNC: 3.6 GM/DL
GLUCOSE SERPL-MCNC: 131 MG/DL (ref 65–99)
HCT VFR BLD AUTO: 30.6 % (ref 34–46.6)
HGB BLD-MCNC: 9.7 G/DL (ref 12–15.9)
HOLD SPECIMEN: NORMAL
HOLD SPECIMEN: NORMAL
IMM GRANULOCYTES # BLD AUTO: 0.02 10*3/MM3 (ref 0–0.05)
IMM GRANULOCYTES NFR BLD AUTO: 0.4 % (ref 0–0.5)
INR PPP: 1.29 (ref 0.91–1.09)
LYMPHOCYTES # BLD AUTO: 1.33 10*3/MM3 (ref 0.7–3.1)
LYMPHOCYTES NFR BLD AUTO: 24.9 % (ref 19.6–45.3)
MCH RBC QN AUTO: 28.5 PG (ref 26.6–33)
MCHC RBC AUTO-ENTMCNC: 31.7 G/DL (ref 31.5–35.7)
MCV RBC AUTO: 90 FL (ref 79–97)
MONOCYTES # BLD AUTO: 0.83 10*3/MM3 (ref 0.1–0.9)
MONOCYTES NFR BLD AUTO: 15.5 % (ref 5–12)
NEUTROPHILS NFR BLD AUTO: 2.82 10*3/MM3 (ref 1.7–7)
NEUTROPHILS NFR BLD AUTO: 52.9 % (ref 42.7–76)
NRBC BLD AUTO-RTO: 0 /100 WBC (ref 0–0.2)
PLATELET # BLD AUTO: 230 10*3/MM3 (ref 140–450)
PMV BLD AUTO: 9.5 FL (ref 6–12)
POTASSIUM SERPL-SCNC: 3.4 MMOL/L (ref 3.5–5.2)
PROT SERPL-MCNC: 6.4 G/DL (ref 6–8.5)
PROTHROMBIN TIME: 15.1 SECONDS (ref 11.5–13.4)
RBC # BLD AUTO: 3.4 10*6/MM3 (ref 3.77–5.28)
SARS-COV-2 RNA PNL SPEC NAA+PROBE: NOT DETECTED
SODIUM SERPL-SCNC: 140 MMOL/L (ref 136–145)
TROPONIN T SERPL-MCNC: 0.05 NG/ML (ref 0–0.03)
TROPONIN T SERPL-MCNC: <0.01 NG/ML (ref 0–0.03)
WBC # BLD AUTO: 5.34 10*3/MM3 (ref 3.4–10.8)
WHOLE BLOOD HOLD SPECIMEN: NORMAL

## 2021-06-22 PROCEDURE — 71275 CT ANGIOGRAPHY CHEST: CPT

## 2021-06-22 PROCEDURE — 80053 COMPREHEN METABOLIC PANEL: CPT | Performed by: FAMILY MEDICINE

## 2021-06-22 PROCEDURE — 11042 DBRDMT SUBQ TIS 1ST 20SQCM/<: CPT | Performed by: SURGERY

## 2021-06-22 PROCEDURE — 99203 OFFICE O/P NEW LOW 30 MIN: CPT | Performed by: SURGERY

## 2021-06-22 PROCEDURE — 87635 SARS-COV-2 COVID-19 AMP PRB: CPT | Performed by: FAMILY MEDICINE

## 2021-06-22 PROCEDURE — G0300 HHS/HOSPICE OF LPN EA 15 MIN: HCPCS

## 2021-06-22 PROCEDURE — 84484 ASSAY OF TROPONIN QUANT: CPT | Performed by: FAMILY MEDICINE

## 2021-06-22 PROCEDURE — 11045 DBRDMT SUBQ TISS EACH ADDL: CPT | Performed by: SURGERY

## 2021-06-22 PROCEDURE — C9803 HOPD COVID-19 SPEC COLLECT: HCPCS

## 2021-06-22 PROCEDURE — 71045 X-RAY EXAM CHEST 1 VIEW: CPT

## 2021-06-22 PROCEDURE — 87176 TISSUE HOMOGENIZATION CULTR: CPT | Performed by: SURGERY

## 2021-06-22 PROCEDURE — G0378 HOSPITAL OBSERVATION PER HR: HCPCS

## 2021-06-22 PROCEDURE — 87070 CULTURE OTHR SPECIMN AEROBIC: CPT | Performed by: SURGERY

## 2021-06-22 PROCEDURE — 72125 CT NECK SPINE W/O DYE: CPT

## 2021-06-22 PROCEDURE — 93010 ELECTROCARDIOGRAM REPORT: CPT | Performed by: INTERNAL MEDICINE

## 2021-06-22 PROCEDURE — 87076 CULTURE ANAEROBE IDENT EACH: CPT | Performed by: SURGERY

## 2021-06-22 PROCEDURE — 99284 EMERGENCY DEPT VISIT MOD MDM: CPT

## 2021-06-22 PROCEDURE — 87205 SMEAR GRAM STAIN: CPT | Performed by: SURGERY

## 2021-06-22 PROCEDURE — 85025 COMPLETE CBC W/AUTO DIFF WBC: CPT | Performed by: FAMILY MEDICINE

## 2021-06-22 PROCEDURE — 85379 FIBRIN DEGRADATION QUANT: CPT | Performed by: FAMILY MEDICINE

## 2021-06-22 PROCEDURE — 93005 ELECTROCARDIOGRAM TRACING: CPT | Performed by: FAMILY MEDICINE

## 2021-06-22 PROCEDURE — 0 IOPAMIDOL PER 1 ML: Performed by: FAMILY MEDICINE

## 2021-06-22 PROCEDURE — 85730 THROMBOPLASTIN TIME PARTIAL: CPT | Performed by: FAMILY MEDICINE

## 2021-06-22 PROCEDURE — 70450 CT HEAD/BRAIN W/O DYE: CPT

## 2021-06-22 PROCEDURE — G0463 HOSPITAL OUTPT CLINIC VISIT: HCPCS

## 2021-06-22 PROCEDURE — 87075 CULTR BACTERIA EXCEPT BLOOD: CPT | Performed by: SURGERY

## 2021-06-22 PROCEDURE — 85610 PROTHROMBIN TIME: CPT | Performed by: FAMILY MEDICINE

## 2021-06-22 RX ORDER — FUROSEMIDE 10 MG/ML
20 INJECTION INTRAMUSCULAR; INTRAVENOUS ONCE
Status: COMPLETED | OUTPATIENT
Start: 2021-06-22 | End: 2021-06-23

## 2021-06-22 RX ORDER — NALOXEGOL OXALATE 25 MG/1
25 TABLET, FILM COATED ORAL EVERY MORNING
Qty: 90 TABLET | Refills: 3 | Status: SHIPPED | OUTPATIENT
Start: 2021-06-22 | End: 2021-07-13 | Stop reason: HOSPADM

## 2021-06-22 RX ORDER — BUDESONIDE 0.5 MG/2ML
0.5 INHALANT ORAL 2 TIMES DAILY PRN
Status: DISCONTINUED | OUTPATIENT
Start: 2021-06-22 | End: 2021-06-23 | Stop reason: HOSPADM

## 2021-06-22 RX ORDER — ISOSORBIDE MONONITRATE 60 MG/1
60 TABLET, EXTENDED RELEASE ORAL
Status: DISCONTINUED | OUTPATIENT
Start: 2021-06-22 | End: 2021-06-23 | Stop reason: HOSPADM

## 2021-06-22 RX ORDER — BISACODYL 10 MG
10 SUPPOSITORY, RECTAL RECTAL DAILY PRN
Status: DISCONTINUED | OUTPATIENT
Start: 2021-06-22 | End: 2021-06-23 | Stop reason: HOSPADM

## 2021-06-22 RX ORDER — FLUTICASONE PROPIONATE 50 MCG
1 SPRAY, SUSPENSION (ML) NASAL DAILY
Status: DISCONTINUED | OUTPATIENT
Start: 2021-06-23 | End: 2021-06-23 | Stop reason: HOSPADM

## 2021-06-22 RX ORDER — ACETAMINOPHEN 650 MG/1
650 SUPPOSITORY RECTAL EVERY 4 HOURS PRN
Status: DISCONTINUED | OUTPATIENT
Start: 2021-06-22 | End: 2021-06-23 | Stop reason: HOSPADM

## 2021-06-22 RX ORDER — SALSALATE 500 MG
500 TABLET ORAL 4 TIMES DAILY
Qty: 120 TABLET | Refills: 11 | Status: SHIPPED | OUTPATIENT
Start: 2021-06-22 | End: 2021-07-13 | Stop reason: HOSPADM

## 2021-06-22 RX ORDER — TRAMADOL HYDROCHLORIDE 50 MG/1
50 TABLET ORAL 3 TIMES DAILY
Status: DISCONTINUED | OUTPATIENT
Start: 2021-06-22 | End: 2021-06-23 | Stop reason: HOSPADM

## 2021-06-22 RX ORDER — OXYCODONE HYDROCHLORIDE 15 MG/1
15 TABLET ORAL EVERY 12 HOURS
Qty: 60 TABLET | Refills: 0 | Status: SHIPPED | OUTPATIENT
Start: 2021-07-21 | End: 2021-07-01

## 2021-06-22 RX ORDER — GUAIFENESIN 600 MG/1
600 TABLET, EXTENDED RELEASE ORAL EVERY 12 HOURS SCHEDULED
Status: DISCONTINUED | OUTPATIENT
Start: 2021-06-22 | End: 2021-06-23 | Stop reason: HOSPADM

## 2021-06-22 RX ORDER — SODIUM CHLORIDE 0.9 % (FLUSH) 0.9 %
10 SYRINGE (ML) INJECTION AS NEEDED
Status: DISCONTINUED | OUTPATIENT
Start: 2021-06-22 | End: 2021-06-23 | Stop reason: HOSPADM

## 2021-06-22 RX ORDER — CYCLOBENZAPRINE HCL 10 MG
10 TABLET ORAL 3 TIMES DAILY PRN
Status: DISCONTINUED | OUTPATIENT
Start: 2021-06-22 | End: 2021-06-23 | Stop reason: HOSPADM

## 2021-06-22 RX ORDER — LIDOCAINE 50 MG/G
1 PATCH TOPICAL DAILY PRN
COMMUNITY
End: 2022-03-28

## 2021-06-22 RX ORDER — ACETAMINOPHEN 325 MG/1
650 TABLET ORAL EVERY 4 HOURS PRN
Status: DISCONTINUED | OUTPATIENT
Start: 2021-06-22 | End: 2021-06-23 | Stop reason: HOSPADM

## 2021-06-22 RX ORDER — NITROGLYCERIN 0.4 MG/1
0.4 TABLET SUBLINGUAL
Status: DISCONTINUED | OUTPATIENT
Start: 2021-06-22 | End: 2021-06-23 | Stop reason: HOSPADM

## 2021-06-22 RX ORDER — GABAPENTIN 100 MG/1
100 CAPSULE ORAL 2 TIMES DAILY
COMMUNITY
End: 2021-06-22

## 2021-06-22 RX ORDER — ACETAMINOPHEN 160 MG/5ML
650 SOLUTION ORAL EVERY 4 HOURS PRN
Status: DISCONTINUED | OUTPATIENT
Start: 2021-06-22 | End: 2021-06-23 | Stop reason: HOSPADM

## 2021-06-22 RX ORDER — GABAPENTIN 300 MG/1
300 CAPSULE ORAL NIGHTLY
Status: DISCONTINUED | OUTPATIENT
Start: 2021-06-22 | End: 2021-06-23 | Stop reason: HOSPADM

## 2021-06-22 RX ORDER — LIDOCAINE 50 MG/G
1 PATCH TOPICAL EVERY 24 HOURS
Status: DISCONTINUED | OUTPATIENT
Start: 2021-06-22 | End: 2021-06-23 | Stop reason: HOSPADM

## 2021-06-22 RX ORDER — FAMOTIDINE 20 MG/1
40 TABLET, FILM COATED ORAL DAILY
Status: DISCONTINUED | OUTPATIENT
Start: 2021-06-23 | End: 2021-06-23 | Stop reason: HOSPADM

## 2021-06-22 RX ORDER — OXYCODONE HYDROCHLORIDE 5 MG/1
15 TABLET ORAL 4 TIMES DAILY
Status: DISCONTINUED | OUTPATIENT
Start: 2021-06-22 | End: 2021-06-23 | Stop reason: HOSPADM

## 2021-06-22 RX ORDER — DOCUSATE SODIUM 100 MG/1
100 CAPSULE, LIQUID FILLED ORAL DAILY
Status: DISCONTINUED | OUTPATIENT
Start: 2021-06-23 | End: 2021-06-23 | Stop reason: HOSPADM

## 2021-06-22 RX ORDER — SACCHAROMYCES BOULARDII 250 MG
250 CAPSULE ORAL DAILY
Status: DISCONTINUED | OUTPATIENT
Start: 2021-06-23 | End: 2021-06-23 | Stop reason: HOSPADM

## 2021-06-22 RX ORDER — LEVOTHYROXINE SODIUM 0.07 MG/1
75 TABLET ORAL
Status: DISCONTINUED | OUTPATIENT
Start: 2021-06-23 | End: 2021-06-23 | Stop reason: HOSPADM

## 2021-06-22 RX ORDER — OXYCODONE HYDROCHLORIDE 15 MG/1
15 TABLET ORAL EVERY 12 HOURS
Qty: 60 TABLET | Refills: 0 | Status: SHIPPED | OUTPATIENT
Start: 2021-06-22 | End: 2021-06-22

## 2021-06-22 RX ORDER — PROMETHAZINE HYDROCHLORIDE 25 MG/1
25 TABLET ORAL EVERY 6 HOURS PRN
Status: DISCONTINUED | OUTPATIENT
Start: 2021-06-22 | End: 2021-06-23 | Stop reason: HOSPADM

## 2021-06-22 RX ORDER — LEVOTHYROXINE SODIUM 0.07 MG/1
75 TABLET ORAL DAILY
Qty: 90 TABLET | Refills: 3 | Status: SHIPPED | OUTPATIENT
Start: 2021-06-22 | End: 2023-02-03 | Stop reason: SDUPTHER

## 2021-06-22 RX ORDER — MULTIPLE VITAMINS W/ MINERALS TAB 9MG-400MCG
1 TAB ORAL DAILY
Status: DISCONTINUED | OUTPATIENT
Start: 2021-06-23 | End: 2021-06-23 | Stop reason: HOSPADM

## 2021-06-22 RX ORDER — FUROSEMIDE 40 MG/1
40 TABLET ORAL DAILY
Status: DISCONTINUED | OUTPATIENT
Start: 2021-06-23 | End: 2021-06-23 | Stop reason: HOSPADM

## 2021-06-22 RX ORDER — SODIUM CHLORIDE 0.9 % (FLUSH) 0.9 %
10 SYRINGE (ML) INJECTION EVERY 12 HOURS SCHEDULED
Status: DISCONTINUED | OUTPATIENT
Start: 2021-06-22 | End: 2021-06-23 | Stop reason: HOSPADM

## 2021-06-22 RX ORDER — ONDANSETRON 4 MG/1
4 TABLET, FILM COATED ORAL EVERY 6 HOURS PRN
Status: DISCONTINUED | OUTPATIENT
Start: 2021-06-22 | End: 2021-06-23 | Stop reason: HOSPADM

## 2021-06-22 RX ORDER — VALACYCLOVIR HYDROCHLORIDE 500 MG/1
500 TABLET, FILM COATED ORAL
Qty: 90 TABLET | Refills: 3 | Status: ON HOLD | OUTPATIENT
Start: 2021-06-22 | End: 2022-04-23

## 2021-06-22 RX ORDER — SUCRALFATE 1 G/1
1 TABLET ORAL
COMMUNITY
End: 2023-02-03 | Stop reason: SDUPTHER

## 2021-06-22 RX ORDER — VALACYCLOVIR HYDROCHLORIDE 500 MG/1
500 TABLET, FILM COATED ORAL
Status: DISCONTINUED | OUTPATIENT
Start: 2021-06-23 | End: 2021-06-23 | Stop reason: HOSPADM

## 2021-06-22 RX ORDER — GABAPENTIN 100 MG/1
100 CAPSULE ORAL EVERY 12 HOURS SCHEDULED
Status: DISCONTINUED | OUTPATIENT
Start: 2021-06-23 | End: 2021-06-23 | Stop reason: HOSPADM

## 2021-06-22 RX ORDER — PANTOPRAZOLE SODIUM 40 MG/1
40 TABLET, DELAYED RELEASE ORAL DAILY
Qty: 90 TABLET | Refills: 3 | Status: SHIPPED | OUTPATIENT
Start: 2021-06-22 | End: 2022-04-26 | Stop reason: HOSPADM

## 2021-06-22 RX ORDER — CARVEDILOL 25 MG/1
25 TABLET ORAL 2 TIMES DAILY WITH MEALS
Status: DISCONTINUED | OUTPATIENT
Start: 2021-06-22 | End: 2021-06-23 | Stop reason: HOSPADM

## 2021-06-22 RX ORDER — SUCRALFATE 1 G/1
1 TABLET ORAL
Status: DISCONTINUED | OUTPATIENT
Start: 2021-06-22 | End: 2021-06-23 | Stop reason: HOSPADM

## 2021-06-22 RX ORDER — FAMOTIDINE 40 MG/1
40 TABLET, FILM COATED ORAL DAILY
Qty: 90 TABLET | Refills: 3 | Status: SHIPPED | OUTPATIENT
Start: 2021-06-22 | End: 2021-07-01

## 2021-06-22 RX ORDER — POTASSIUM CHLORIDE 750 MG/1
20 CAPSULE, EXTENDED RELEASE ORAL DAILY
Status: DISCONTINUED | OUTPATIENT
Start: 2021-06-23 | End: 2021-06-23 | Stop reason: HOSPADM

## 2021-06-22 RX ORDER — PANTOPRAZOLE SODIUM 40 MG/1
40 TABLET, DELAYED RELEASE ORAL
Status: DISCONTINUED | OUTPATIENT
Start: 2021-06-23 | End: 2021-06-23 | Stop reason: HOSPADM

## 2021-06-22 RX ORDER — POTASSIUM CHLORIDE 750 MG/1
40 CAPSULE, EXTENDED RELEASE ORAL
Status: COMPLETED | OUTPATIENT
Start: 2021-06-22 | End: 2021-06-23

## 2021-06-22 RX ADMIN — IOPAMIDOL 78 ML: 755 INJECTION, SOLUTION INTRAVENOUS at 20:45

## 2021-06-22 RX ADMIN — OXYCODONE HYDROCHLORIDE 15 MG: 5 TABLET ORAL at 23:51

## 2021-06-22 NOTE — ED TRIAGE NOTES
PATIENT PRESENTS WITH A FALL AT 1530 TODAY WHILE SHE WAS STANDING. PATIENT REPORTS SHE TRIPPED OVER A CHAIR LEG. PATIENT REPORTS SHE HIT THE LEFT LATERAL ASPECT OF HER HEAD BUT HAD NO LOC. PATIENT REPORTS DIZZINESS BUT NO OTHER COMPLAINTS.

## 2021-06-22 NOTE — HOME HEALTH
Patient has purchased a new home. Address is [address] [place] [id number] Plans to be living there within 2 weeks.    Wound vac has not arrived as of today. Patient also has a wound care appt that she did not think to tell SN until this AM. Wound care completed per order. Removed old dressing. Cleansed wound bed thoroughly with wound wash, patted dry. Applied moistended gauze to wound bed. Covered with 2 folded ABD pad, secured with tape. Patient states her dressing from yesterday held up pretty good and just became wet this AM. Measurements 10.5 x 5 x 6 today. Asked patient to call SN if wound vac does not get delivered today so that SN can call to find out where it is. Asked patient to also check at her residence on [facility] to be sure shipment had not been left there.

## 2021-06-23 ENCOUNTER — APPOINTMENT (OUTPATIENT)
Dept: ULTRASOUND IMAGING | Facility: HOSPITAL | Age: 66
End: 2021-06-23

## 2021-06-23 ENCOUNTER — HOME CARE VISIT (OUTPATIENT)
Dept: HOME HEALTH SERVICES | Facility: CLINIC | Age: 66
End: 2021-06-23

## 2021-06-23 ENCOUNTER — READMISSION MANAGEMENT (OUTPATIENT)
Dept: CALL CENTER | Facility: HOSPITAL | Age: 66
End: 2021-06-23

## 2021-06-23 VITALS
HEIGHT: 66 IN | RESPIRATION RATE: 16 BRPM | TEMPERATURE: 98.7 F | SYSTOLIC BLOOD PRESSURE: 105 MMHG | DIASTOLIC BLOOD PRESSURE: 51 MMHG | BODY MASS INDEX: 32.38 KG/M2 | HEART RATE: 77 BPM | WEIGHT: 201.5 LBS | OXYGEN SATURATION: 94 %

## 2021-06-23 PROBLEM — I82.409 ACUTE DVT (DEEP VENOUS THROMBOSIS): Status: ACTIVE | Noted: 2021-06-23

## 2021-06-23 LAB
ANION GAP SERPL CALCULATED.3IONS-SCNC: 7 MMOL/L (ref 5–15)
BACTERIA UR QL AUTO: ABNORMAL /HPF
BILIRUB UR QL STRIP: NEGATIVE
BUN SERPL-MCNC: 11 MG/DL (ref 8–23)
BUN/CREAT SERPL: 13.8 (ref 7–25)
CALCIUM SPEC-SCNC: 8.5 MG/DL (ref 8.6–10.5)
CHLORIDE SERPL-SCNC: 101 MMOL/L (ref 98–107)
CLARITY UR: CLEAR
CO2 SERPL-SCNC: 33 MMOL/L (ref 22–29)
COLOR UR: YELLOW
CREAT SERPL-MCNC: 0.8 MG/DL (ref 0.57–1)
DEPRECATED RDW RBC AUTO: 50.3 FL (ref 37–54)
ERYTHROCYTE [DISTWIDTH] IN BLOOD BY AUTOMATED COUNT: 15.5 % (ref 12.3–15.4)
GFR SERPL CREATININE-BSD FRML MDRD: 72 ML/MIN/1.73
GLUCOSE SERPL-MCNC: 91 MG/DL (ref 65–99)
GLUCOSE UR STRIP-MCNC: NEGATIVE MG/DL
HCT VFR BLD AUTO: 28.3 % (ref 34–46.6)
HGB BLD-MCNC: 8.9 G/DL (ref 12–15.9)
HGB UR QL STRIP.AUTO: ABNORMAL
HYALINE CASTS UR QL AUTO: ABNORMAL /LPF
KETONES UR QL STRIP: NEGATIVE
LEUKOCYTE ESTERASE UR QL STRIP.AUTO: ABNORMAL
MCH RBC QN AUTO: 28.4 PG (ref 26.6–33)
MCHC RBC AUTO-ENTMCNC: 31.4 G/DL (ref 31.5–35.7)
MCV RBC AUTO: 90.4 FL (ref 79–97)
NITRITE UR QL STRIP: NEGATIVE
PH UR STRIP.AUTO: 5.5 [PH] (ref 5–8)
PLATELET # BLD AUTO: 232 10*3/MM3 (ref 140–450)
PMV BLD AUTO: 10 FL (ref 6–12)
POTASSIUM SERPL-SCNC: 3.8 MMOL/L (ref 3.5–5.2)
PROT UR QL STRIP: NEGATIVE
RBC # BLD AUTO: 3.13 10*6/MM3 (ref 3.77–5.28)
RBC # UR: ABNORMAL /HPF
REF LAB TEST METHOD: ABNORMAL
SODIUM SERPL-SCNC: 141 MMOL/L (ref 136–145)
SP GR UR STRIP: 1.02 (ref 1–1.03)
SQUAMOUS #/AREA URNS HPF: ABNORMAL /HPF
UROBILINOGEN UR QL STRIP: ABNORMAL
WBC # BLD AUTO: 5.16 10*3/MM3 (ref 3.4–10.8)
WBC UR QL AUTO: ABNORMAL /HPF

## 2021-06-23 PROCEDURE — 25010000002 FUROSEMIDE PER 20 MG: Performed by: NURSE PRACTITIONER

## 2021-06-23 PROCEDURE — G0300 HHS/HOSPICE OF LPN EA 15 MIN: HCPCS

## 2021-06-23 PROCEDURE — G0378 HOSPITAL OBSERVATION PER HR: HCPCS

## 2021-06-23 PROCEDURE — 93970 EXTREMITY STUDY: CPT | Performed by: SURGERY

## 2021-06-23 PROCEDURE — 99218 PR INITIAL OBSERVATION CARE/DAY 30 MINUTES: CPT | Performed by: NURSE PRACTITIONER

## 2021-06-23 PROCEDURE — 80048 BASIC METABOLIC PNL TOTAL CA: CPT | Performed by: NURSE PRACTITIONER

## 2021-06-23 PROCEDURE — 87086 URINE CULTURE/COLONY COUNT: CPT | Performed by: NURSE PRACTITIONER

## 2021-06-23 PROCEDURE — 93970 EXTREMITY STUDY: CPT

## 2021-06-23 PROCEDURE — 96374 THER/PROPH/DIAG INJ IV PUSH: CPT

## 2021-06-23 PROCEDURE — 85027 COMPLETE CBC AUTOMATED: CPT | Performed by: NURSE PRACTITIONER

## 2021-06-23 PROCEDURE — 81001 URINALYSIS AUTO W/SCOPE: CPT | Performed by: NURSE PRACTITIONER

## 2021-06-23 RX ADMIN — ISOSORBIDE MONONITRATE 60 MG: 60 TABLET, EXTENDED RELEASE ORAL at 00:22

## 2021-06-23 RX ADMIN — LEVOTHYROXINE SODIUM 75 MCG: 75 TABLET ORAL at 05:56

## 2021-06-23 RX ADMIN — FUROSEMIDE 40 MG: 40 TABLET ORAL at 08:54

## 2021-06-23 RX ADMIN — DOCUSATE SODIUM 100 MG: 100 CAPSULE ORAL at 08:55

## 2021-06-23 RX ADMIN — TRAMADOL HYDROCHLORIDE 50 MG: 50 TABLET ORAL at 08:56

## 2021-06-23 RX ADMIN — MAGNESIUM GLUCONATE 500 MG ORAL TABLET 400 MG: 500 TABLET ORAL at 08:55

## 2021-06-23 RX ADMIN — FAMOTIDINE 40 MG: 20 TABLET, FILM COATED ORAL at 08:55

## 2021-06-23 RX ADMIN — POTASSIUM CHLORIDE 40 MEQ: 750 CAPSULE, EXTENDED RELEASE ORAL at 00:22

## 2021-06-23 RX ADMIN — FLUTICASONE PROPIONATE 1 SPRAY: 50 SPRAY, METERED NASAL at 08:57

## 2021-06-23 RX ADMIN — SODIUM CHLORIDE, PRESERVATIVE FREE 10 ML: 5 INJECTION INTRAVENOUS at 08:57

## 2021-06-23 RX ADMIN — POTASSIUM CHLORIDE 40 MEQ: 750 CAPSULE, EXTENDED RELEASE ORAL at 03:21

## 2021-06-23 RX ADMIN — VALACYCLOVIR 500 MG: 500 TABLET, FILM COATED ORAL at 08:55

## 2021-06-23 RX ADMIN — SUCRALFATE 1 G: 1 TABLET ORAL at 12:26

## 2021-06-23 RX ADMIN — GUAIFENESIN 600 MG: 600 TABLET, EXTENDED RELEASE ORAL at 08:55

## 2021-06-23 RX ADMIN — FUROSEMIDE 20 MG: 10 INJECTION, SOLUTION INTRAMUSCULAR; INTRAVENOUS at 00:22

## 2021-06-23 RX ADMIN — POTASSIUM CHLORIDE 20 MEQ: 750 CAPSULE, EXTENDED RELEASE ORAL at 08:55

## 2021-06-23 RX ADMIN — Medication 250 MG: at 08:55

## 2021-06-23 RX ADMIN — TRAMADOL HYDROCHLORIDE 50 MG: 50 TABLET ORAL at 00:23

## 2021-06-23 RX ADMIN — CARVEDILOL 25 MG: 25 TABLET, FILM COATED ORAL at 00:22

## 2021-06-23 RX ADMIN — SODIUM CHLORIDE, PRESERVATIVE FREE 10 ML: 5 INJECTION INTRAVENOUS at 00:23

## 2021-06-23 RX ADMIN — TRAMADOL HYDROCHLORIDE 50 MG: 50 TABLET ORAL at 16:22

## 2021-06-23 RX ADMIN — GABAPENTIN 300 MG: 300 CAPSULE ORAL at 00:23

## 2021-06-23 RX ADMIN — APIXABAN 10 MG: 5 TABLET, FILM COATED ORAL at 16:22

## 2021-06-23 RX ADMIN — GUAIFENESIN 600 MG: 600 TABLET, EXTENDED RELEASE ORAL at 00:22

## 2021-06-23 RX ADMIN — SUCRALFATE 1 G: 1 TABLET ORAL at 08:53

## 2021-06-23 RX ADMIN — Medication 1 TABLET: at 08:55

## 2021-06-23 RX ADMIN — OXYCODONE HYDROCHLORIDE 15 MG: 5 TABLET ORAL at 08:56

## 2021-06-23 RX ADMIN — GABAPENTIN 100 MG: 100 CAPSULE ORAL at 05:56

## 2021-06-23 RX ADMIN — OXYCODONE HYDROCHLORIDE 15 MG: 5 TABLET ORAL at 12:26

## 2021-06-23 RX ADMIN — LIDOCAINE 1 PATCH: 50 PATCH CUTANEOUS at 00:23

## 2021-06-23 RX ADMIN — SUCRALFATE 1 G: 1 TABLET ORAL at 00:23

## 2021-06-23 RX ADMIN — PANTOPRAZOLE SODIUM 40 MG: 40 TABLET, DELAYED RELEASE ORAL at 05:56

## 2021-06-23 NOTE — CASE MANAGEMENT/SOCIAL WORK
Continued Stay Note  SAL Meredith     Patient Name: Tawny Shin  MRN: [id number]  Today's Date: 6/23/2021    Admit Date: 6/22/2021    Discharge Plan     Row Name 06/23/21 1009       Plan    Plan Comments  Pt was sound asleep when SW went to assess will try again later.  (Pended)         Discharge Codes    No documentation.             Rene Perez

## 2021-06-23 NOTE — CONSULTS
[facility]  INPATIENT WOUND CONSULTATION    Today's Date: 06/23/21    Patient Name: Tawny Shin  MRN: [id number]  CSN: [id number]  PCP: Davon Urrutia MD  Referring Provider: Jun Hawley MD  Attending Provider: Jun Hawley*  Length of Stay: 0    SUBJECTIVE   Chief Complaint: Wound of right hip    HPI: Tawny Shin, a 65 y.o.female, with a past medical history of Sjogren's disease, MI, CAD, and arthritis.  A full past medical history is listed below.  Patient is admitted to the hospital due to a concussion with no loss of consciousness.  Patient fell at her sister's home where she has been staying for the past 2 weeks since discharge from [facility].      Inpatient wound care is consulted due to wound of right hip.  Patient is being discharged home today.  Wound is a surgical wound from a gluteal tendon repair by Dr. Carlson in January.  In February, she was taken back to surgery due to the wound dehiscing and abscess forming.  The wound was drained and debrided at that time.   Patient is being followed by Dr. Copleand with ID and has just started care with [facility].  She also has home health.  She states that her wound vac has been off since Wednesday due to cannister being full and they couldn't get the cannister off.  She states she has a wound vac waiting for her with home health after she is discharged.  On chart review it appears that a wound culture was taken on 6/22 which has no growth.  Patient sister is at the bedside.      Visit Dx:    ICD-10-CM ICD-9-CM   1. Concussion without loss of consciousness, initial encounter  S06.0X0A 850.0   2. Elevated troponin  R77.8 790.6     Patient Active Problem List   Diagnosis   • Eustachian tube dysfunction   • Sensorineural hearing loss   • Spondylolisthesis of lumbar region   • Coronary artery disease   • Hyperlipidemia   • Hypertension   • Myalgia due to statin   • S/P coronary artery  stent placement   • Pacemaker   • Seropositive rheumatoid arthritis of multiple sites (CMS/[facility])   • Sick sinus syndrome (CMS/[facility])   • Age-related osteoporosis with current pathological fracture   • Allergic-infective asthma   • Arthritis of both knees   • Vasovagal syncope   • Bilateral bunions   • Bronchitis   • Cardiac pacemaker syndrome   • Charcot's joint of foot, left   • Constipation   • 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   • Intrinsic asthma   • Left carotid bruit   • Neutropenia (CMS/[facility])   • Primary osteoarthritis of left knee   • Psoriasis vulgaris   • Recurrent acute serous otitis media of both ears   • Thrombocytopenia (CMS/[facility])   • Hypothyroidism   • Vitamin D deficiency   • Myxedema heart disease   • Syncope, cardiogenic   • Rupture of gluteus minimus tendon   • Muscle tear   • Syncope, recurrent   • Leukocytosis   • Headache   • Elevated CPK   • Staphylococcus aureus bacteremia   • Right hip pain, suspected infection   • Obesity (BMI 30-39.9)   • Stenosis of cervical spine with myelopathy (CMS/[facility])   • Spinal stenosis, lumbar region, with neurogenic claudication   • Osteomyelitis of lumbar spine (CMS/[facility])   • Iliopsoas abscess (CMS/[facility])   • Closed wedge compression fracture of T12 vertebra (CMS/[facility])   • Concussion with no loss of consciousness   • Fall as cause of accidental injury at home as place of occurrence   • Chronic pain syndrome   • Hypokalemia   • Bilateral lower extremity edema   • Acute DVT (deep venous thrombosis) (CMS/[facility]), left       History:   Past Medical History:   Diagnosis Date   • Arthritis    • Asthma    • Chronic sinusitis    • Coronary artery disease    • Eustachian tube dysfunction    • Heart disease    • Herpes simplex    • Hyperlipidemia    • Hypertension    • Knee dislocation    • Labral tear of right hip joint    • Laryngitis sicca    • Laryngitis, chronic    • MI (myocardial infarction) (CMS/[facility])    • Otorrhea    • Sensorineural hearing loss    • Sjogren's disease  (CMS/[facility])      Past Surgical History:   Procedure Laterality Date   • A-V CARDIAC PACEMAKER INSERTION  2016   • ATRIAL CARDIAC PACEMAKER INSERTION     • CARDIAC CATHETERIZATION     • CATARACT EXTRACTION     • CERVICAL CORPECTOMY N/A 3/3/2021    Procedure: CERVICAL 6 CORPECTOMY WITH TITANIUM CAGE WITH NEURO MONITORING;  Surgeon: Bandar Shea MD;  Location:  [facility] OR;  Service: Neurosurgery;  Laterality: N/A;   • COLONOSCOPY  11/08/2011    One fold in the ascending colon which showed ulcer otherwise normal exam   • COLONOSCOPY  11/12/2004    Normal exam repeat in five years   • CORONARY ANGIOPLASTY WITH STENT PLACEMENT      X 2; 2013 & 2014   • ENDOSCOPY  07/10/2014    Normal exam   • HIP ABDUCTION TENOTOMY BILATERAL Right 1/14/2021    Procedure: RIGHT HIP GLUTEUS MEDLUS / MINIMUS REPAIR, POSSIBLE ACHILLES ALLOGRAFT;  Surgeon: Nino Carlson MD;  Location:  [facility] OR;  Service: Orthopedics;  Laterality: Right;   • INCISION AND DRAINAGE HIP Right 2/9/2021    Procedure: HIP INCISION AND DRAINAGE;  Surgeon: Nino Carlson MD;  Location:  [facility] OR;  Service: Orthopedics;  Laterality: Right;   • JOINT REPLACEMENT     • LUMBAR DISCECTOMY Right 3/23/2021    Procedure: LUMBAR DISCECTOMY MICRO, Lumbar 1/2 right;  Surgeon: Bandar Shea MD;  Location:  [facility];  Service: Neurosurgery;  Laterality: Right;   • LUMBAR FUSION N/A 1/19/2018    Procedure: L3-4,L4-5 DECOMPRESSION, POSTERIOR SPINAL FUSION WITH INSTRUMENTATION;  Surgeon: Fortino Oropeza MD;  Location:  [facility] OR;  Service:    • LUMBAR LAMINECTOMY WITH FUSION Left 1/17/2018    Procedure: LEFT L3-4 L4-5 LATERAL LUMBAR INTERBODY FUSION;  Surgeon: Fortino Oropeza MD;  Location:  [facility] OR;  Service:    • MYRINGOTOMY W/ TUBES  09/04/2014    TUBES NO LONGER IN PLACE   • OTHER SURGICAL HISTORY      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 so hardware was removed and spacers were placed   • REPLACEMENT TOTAL KNEE Right      Social History     Socioeconomic History    • Marital status:      Spouse name: Not on file   • Number of children: Not on file   • Years of education: Not on file   • Highest education level: Not on file   Tobacco Use   • Smoking status: Never Smoker   • Smokeless tobacco: Never Used   Substance and Sexual Activity   • Alcohol use: No   • Drug use: Never   • Sexual activity: Defer     Family History   Problem Relation Age of Onset   • Cancer Mother    • Heart disease Father        Allergies:  Allergies   Allergen Reactions   • Atorvastatin Other (See Comments)     LEG CRAMPS     • Amoxicillin Rash   • Escitalopram Rash   • Nabumetone Rash   • Niacin Er Rash   • Penicillins Rash   • Simvastatin Rash       Medications:    Current Facility-Administered Medications:   •  acetaminophen (TYLENOL) tablet 650 mg, 650 mg, Oral, Q4H PRN **OR** acetaminophen (TYLENOL) 160 MG/5ML solution 650 mg, 650 mg, Oral, Q4H PRN **OR** acetaminophen (TYLENOL) suppository 650 mg, 650 mg, Rectal, Q4H PRN, Mayra Chong, APRN  •  apixaban (ELIQUIS) tablet 10 mg, 10 mg, Oral, Q12H **FOLLOWED BY** [START ON 6/30/2021] apixaban (ELIQUIS) tablet 5 mg, 5 mg, Oral, Q12H, Jun Hawley MD  •  bisacodyl (DULCOLAX) suppository 10 mg, 10 mg, Rectal, Daily PRN, Mayra Chong, APRN  •  budesonide (PULMICORT) nebulizer solution 0.5 mg, 0.5 mg, Nebulization, BID PRN, Mayra Chong, APRN  •  carvedilol (COREG) tablet 25 mg, 25 mg, Oral, BID With Meals, Mayra Chong APRN, 25 mg at 06/23/21 0022  •  cyclobenzaprine (FLEXERIL) tablet 10 mg, 10 mg, Oral, TID PRN, Mayra Chong APRN  •  docusate sodium (COLACE) capsule 100 mg, 100 mg, Oral, Daily, Mayra Chnog APRN, 100 mg at 06/23/21 0855  •  famotidine (PEPCID) tablet 40 mg, 40 mg, Oral, Daily, Mayra Chong APRN, 40 mg at 06/23/21 0855  •  fluticasone (FLONASE) 50 MCG/ACT nasal spray 1 spray, 1 spray, Nasal, Daily, Mayra Chong, APRN, 1 spray at 06/23/21 0857  •  furosemide (LASIX) tablet 40  mg, 40 mg, Oral, Daily, Mayra Chong, APRN, 40 mg at 06/23/21 0854  •  gabapentin (NEURONTIN) capsule 100 mg, 100 mg, Oral, Q12H, Mayra Chong, APRN, 100 mg at 06/23/21 0556  •  gabapentin (NEURONTIN) capsule 300 mg, 300 mg, Oral, Nightly, Mayra Chong, APRN, 300 mg at 06/23/21 0023  •  guaiFENesin (MUCINEX) 12 hr tablet 600 mg, 600 mg, Oral, Q12H, Mayra Chong, APRN, 600 mg at 06/23/21 0855  •  isosorbide mononitrate (IMDUR) 24 hr tablet 60 mg, 60 mg, Oral, BID - Nitrates, Mayra Chong, APRN, 60 mg at 06/23/21 0022  •  levothyroxine (SYNTHROID, LEVOTHROID) tablet 75 mcg, 75 mcg, Oral, Q AM, Mayra Chong APRN, 75 mcg at 06/23/21 0556  •  lidocaine (LIDODERM) 5 % 1 patch, 1 patch, Transdermal, Q24H, Mayra Chong, APRN, 1 patch at 06/23/21 0023  •  magnesium hydroxide (MILK OF MAGNESIA) suspension 2400 mg/10mL 10 mL, 10 mL, Oral, Daily PRN, Mayra Chong, APRN  •  magnesium oxide (MAG-OX) tablet 400 mg, 400 mg, Oral, Daily, Mayra Chong, APRN, 400 mg at 06/23/21 0855  •  multivitamin with minerals 1 tablet, 1 tablet, Oral, Daily, Mayra Chong, APRN, 1 tablet at 06/23/21 0855  •  nitroglycerin (NITROSTAT) SL tablet 0.4 mg, 0.4 mg, Sublingual, Q5 Min PRN, Mayra Chong, APRN  •  ondansetron (ZOFRAN) tablet 4 mg, 4 mg, Oral, Q6H PRN, Mayra Chong, APRN  •  oxyCODONE (ROXICODONE) immediate release tablet 15 mg, 15 mg, Oral, 4x Daily, Mayra Chong, APRN, 15 mg at 06/23/21 1226  •  pantoprazole (PROTONIX) EC tablet 40 mg, 40 mg, Oral, Q AM, Mayra Chong, APRN, 40 mg at 06/23/21 0556  •  potassium chloride (MICRO-K) CR capsule 20 mEq, 20 mEq, Oral, Daily, Mayra Chong, APRN, 20 mEq at 06/23/21 0855  •  promethazine (PHENERGAN) tablet 25 mg, 25 mg, Oral, Q6H PRN, Mayra Chong, APRN  •  saccharomyces boulardii (FLORASTOR) capsule 250 mg, 250 mg, Oral, Daily, Mayra Chong, APRN, 250 mg at 06/23/21 0855  •  sodium chloride 0.9 % flush 10 mL, 10  mL, Intravenous, Q12H, Mayra Chong APRN, 10 mL at 06/23/21 0857  •  sodium chloride 0.9 % flush 10 mL, 10 mL, Intravenous, PRN, Mayra Chong, APRN  •  sucralfate (CARAFATE) tablet 1 g, 1 g, Oral, 4x Daily AC & at Bedtime, Mayra Chong APRN, 1 g at 06/23/21 1226  •  traMADol (ULTRAM) tablet 50 mg, 50 mg, Oral, TID, Mayra Chong APRN, 50 mg at 06/23/21 0856  •  valACYclovir (VALTREX) tablet 500 mg, 500 mg, Oral, Q24H, Mayra Chong APRN, 500 mg at 06/23/21 0855    Review of Systems:  Review of Systems   Constitutional: Negative for chills and fever.   HENT: Negative for sinus pain and sore throat.    Respiratory: Negative for cough and shortness of breath.    Cardiovascular: Negative for chest pain and palpitations.   Gastrointestinal: Negative for diarrhea, nausea and vomiting.   Genitourinary: Negative for frequency and urgency.   Musculoskeletal: Positive for arthralgias, gait problem and myalgias.   Skin: Positive for wound. Negative for color change.   Neurological: Negative for dizziness and headaches.   Psychiatric/Behavioral: Negative for behavioral problems, confusion and decreased concentration.         OBJECTIVE     Vitals:    06/23/21 1133   BP: 105/51   Pulse: 77   Resp: 16   Temp: 98.7 °F (37.1 °C)   SpO2: 94%       PHYSICAL EXAM  Physical Exam  Vitals and nursing note reviewed.   Constitutional:       General: She is awake.      Appearance: She is obese.      Comments: BMI: 32.54   HENT:      Head: Normocephalic and atraumatic.   Eyes:      General: Lids are normal. Gaze aligned appropriately.   Cardiovascular:      Rate and Rhythm: Normal rate and regular rhythm.   Pulmonary:      Effort: Pulmonary effort is normal. No respiratory distress.   Abdominal:      General: Abdomen is protuberant.      Palpations: Abdomen is soft.   Musculoskeletal:      Cervical back: Normal range of motion and neck supple.   Skin:     General: Skin is warm and dry.      Findings: Wound present.              Comments: Right greater trochanter surgical wound -wound bed has granulation tissue, smooth red tissue with a small amount of slough.  The wound bed measures 11.2 cm x 5.7 cm x 7.8 cm.  There is undermining from 12 o'clock to 6 o'clock at 2.5 cm.  There is a moderate amount of serous drainage.  The drainage is not malodorous.  There is no erythema of the periwound area.  Slight Epibole of wound edges.   Neurological:      Mental Status: She is alert and oriented to person, place, and time.   Psychiatric:         Attention and Perception: Attention normal.         Mood and Affect: Mood normal.         Speech: Speech normal.         Behavior: Behavior is cooperative.               Results Review:  Lab Results (last 48 hours)     Procedure Component Value Units Date/Time    Basic Metabolic Panel [[id number]]  (Abnormal) Collected: 06/23/21 0432    Specimen: Blood Updated: 06/23/21 0619     Glucose 91 mg/dL      BUN 11 mg/dL      Creatinine 0.80 mg/dL      Sodium 141 mmol/L      Potassium 3.8 mmol/L      Chloride 101 mmol/L      CO2 33.0 mmol/L      Calcium 8.5 mg/dL      eGFR Non African Amer 72 mL/min/1.73      BUN/Creatinine Ratio 13.8     Anion Gap 7.0 mmol/L     Narrative:      GFR Normal >60  Chronic Kidney Disease <60  Kidney Failure <15      CBC (No Diff) [[id number]]  (Abnormal) Collected: 06/23/21 0432    Specimen: Blood Updated: 06/23/21 0540     WBC 5.16 10*3/mm3      RBC 3.13 10*6/mm3      Hemoglobin 8.9 g/dL      Hematocrit 28.3 %      MCV 90.4 fL      MCH 28.4 pg      MCHC 31.4 g/dL      RDW 15.5 %      RDW-SD 50.3 fl      MPV 10.0 fL      Platelets 232 10*3/mm3     Urinalysis, Microscopic Only - Urine, Clean Catch [[id number]]  (Abnormal) Collected: 06/23/21 0141    Specimen: Urine, Clean Catch Updated: 06/23/21 0156     RBC, UA 3-5 /HPF      WBC, UA 6-12 /HPF      Bacteria, UA None Seen /HPF      Squamous Epithelial Cells, UA 13-20 /HPF      Hyaline Casts, UA 0-2 /LPF      Methodology Automated  Microscopy    Urinalysis With Culture If Indicated - Urine, Clean Catch [[id number]]  (Abnormal) Collected: 06/23/21 0141    Specimen: Urine, Clean Catch Updated: 06/23/21 0156     Color, UA Yellow     Appearance, UA Clear     pH, UA 5.5     Specific Gravity, UA 1.024     Glucose, UA Negative     Ketones, UA Negative     Bilirubin, UA Negative     Blood, UA Trace     Protein, UA Negative     Leuk Esterase, UA Small (1+)     Nitrite, UA Negative     Urobilinogen, UA 0.2 E.U./dL    Urine Culture - Urine, Urine, Clean Catch [[id number]] Collected: 06/23/21 0141    Specimen: Urine, Clean Catch Updated: 06/23/21 0156    COVID PRE-OP / PRE-PROCEDURE SCREENING ORDER (NO ISOLATION) - Swab, Nasal Cavity [[id number]]  (Normal) Collected: 06/22/21 2204    Specimen: Swab from Nasal Cavity Updated: 06/22/21 2248    Narrative:      The following orders were created for panel order COVID PRE-OP / PRE-PROCEDURE SCREENING ORDER (NO ISOLATION) - Swab, Nasal Cavity.  Procedure                               Abnormality         Status                     ---------                               -----------         ------                     COVID-19,[facility] IN-[facility]...[[id number]]  Normal              Final result                 Please view results for these tests on the individual orders.    COVID-19,[facility] IN-HOUSE,Nasal Swab No Transport Media 3-4 HR TAT - Swab, Nasal Cavity [[id number]]  (Normal) Collected: 06/22/21 2204    Specimen: Swab from Nasal Cavity Updated: 06/22/21 2248     COVID19 Not Detected    Narrative:      Fact sheet for providers: https://www.fda.gov/media/363781/download     Fact sheet for patients: https://www.fda.gov/media/174246/download    Test performed by PCR.    Consider negative results in combination with clinical observations, patient history, and epidemiological information.    Troponin [[id number]]  (Normal) Collected: 06/22/21 2054    Specimen: Blood Updated: 06/22/21 2120     Troponin T <0.010 ng/mL      Narrative:      Troponin T Reference Range:  <= 0.03 ng/mL-   Negative for AMI  >0.03 ng/mL-     Abnormal for myocardial necrosis.  Clinicians would have to utilize clinical acumen, EKG, Troponin and serial changes to determine if it is an Acute Myocardial Infarction or myocardial injury due to an underlying chronic condition.       Results may be falsely decreased if patient taking Biotin.      [place] Draw [[id number]] Collected: 06/22/21 1857    Specimen: Blood Updated: 06/22/21 2000    Narrative:      The following orders were created for panel order [place] Draw.  Procedure                               Abnormality         Status                     ---------                               -----------         ------                     Green Top (Gel)[[id number]]                                  Final result               Lavender Top[[id number]]                                     Final result               Red Top[[id number]]                                          Final result                 Please view results for these tests on the individual orders.    Green Top (Gel) [[id number]] Collected: 06/22/21 1857    Specimen: Blood Updated: 06/22/21 2000     Extra Tube Hold for add-ons.     Comment: Auto resulted.       Red Top [[id number]] Collected: 06/22/21 1857    Specimen: Blood Updated: 06/22/21 2000     Extra Tube Hold for add-ons.     Comment: Auto resulted.       Lavender Top [[id number]] Collected: 06/22/21 1857    Specimen: Blood Updated: 06/22/21 2000     Extra Tube hold for add-on     Comment: Auto resulted       aPTT [[id number]]  (Abnormal) Collected: 06/22/21 1857    Specimen: Blood Updated: 06/22/21 1931     PTT 37.1 seconds     Protime-INR [[id number]]  (Abnormal) Collected: 06/22/21 1857    Specimen: Blood Updated: 06/22/21 1931     Protime 15.1 Seconds      INR 1.29    D-dimer, Quantitative [[id number]]  (Abnormal) Collected: 06/22/21 1857    Specimen: Blood Updated: 06/22/21 1931     D-Dimer,  Quantitative 7.69 mg/L (FEU)     Narrative:      Reference Range is 0-0.50 mg/L FEU. However, results <0.50 mg/L FEU tends to rule out DVT or PE. Results >0.50 mg/L FEU are not useful in predicting absence or presence of DVT or PE.      Troponin [[id number]]  (Abnormal) Collected: 06/22/21 1857    Specimen: Blood Updated: 06/22/21 1927     Troponin T 0.048 ng/mL     Narrative:      Troponin T Reference Range:  <= 0.03 ng/mL-   Negative for AMI  >0.03 ng/mL-     Abnormal for myocardial necrosis.  Clinicians would have to utilize clinical acumen, EKG, Troponin and serial changes to determine if it is an Acute Myocardial Infarction or myocardial injury due to an underlying chronic condition.       Results may be falsely decreased if patient taking Biotin.      Comprehensive Metabolic Panel [[id number]]  (Abnormal) Collected: 06/22/21 1857    Specimen: Blood Updated: 06/22/21 1924     Glucose 131 mg/dL      BUN 13 mg/dL      Creatinine 0.86 mg/dL      Sodium 140 mmol/L      Potassium 3.4 mmol/L      Comment: Slight hemolysis detected by analyzer. Results may be affected.        Chloride 100 mmol/L      CO2 30.0 mmol/L      Calcium 8.6 mg/dL      Total Protein 6.4 g/dL      Albumin 2.80 g/dL      ALT (SGPT) 6 U/L      AST (SGOT) 24 U/L      Alkaline Phosphatase 92 U/L      Total Bilirubin 0.3 mg/dL      eGFR Non African Amer 66 mL/min/1.73      Globulin 3.6 gm/dL      A/G Ratio 0.8 g/dL      BUN/Creatinine Ratio 15.1     Anion Gap 10.0 mmol/L     Narrative:      GFR Normal >60  Chronic Kidney Disease <60  Kidney Failure <15      CBC & Differential [[id number]]  (Abnormal) Collected: 06/22/21 1857    Specimen: Blood Updated: 06/22/21 1906    Narrative:      The following orders were created for panel order CBC & Differential.  Procedure                               Abnormality         Status                     ---------                               -----------         ------                     CBC Auto  Differential[[id number]]        Abnormal            Final result                 Please view results for these tests on the individual orders.    CBC Auto Differential [[id number]]  (Abnormal) Collected: 06/22/21 1857    Specimen: Blood Updated: 06/22/21 1906     WBC 5.34 10*3/mm3      RBC 3.40 10*6/mm3      Hemoglobin 9.7 g/dL      Hematocrit 30.6 %      MCV 90.0 fL      MCH 28.5 pg      MCHC 31.7 g/dL      RDW 15.2 %      RDW-SD 50.6 fl      MPV 9.5 fL      Platelets 230 10*3/mm3      Neutrophil % 52.9 %      Lymphocyte % 24.9 %      Monocyte % 15.5 %      Eosinophil % 5.2 %      Basophil % 1.1 %      Immature Grans % 0.4 %      Neutrophils, Absolute 2.82 10*3/mm3      Lymphocytes, Absolute 1.33 10*3/mm3      Monocytes, Absolute 0.83 10*3/mm3      Eosinophils, Absolute 0.28 10*3/mm3      Basophils, Absolute 0.06 10*3/mm3      Immature Grans, Absolute 0.02 10*3/mm3      nRBC 0.0 /100 WBC         Imaging Results (Last 72 Hours)     Procedure Component Value Units Date/Time    US Venous Doppler Lower Extremity Bilateral (duplex) [[id number]] Resulted: 06/23/21 1132     Updated: 06/23/21 1200    CT Angiogram Chest [[id number]] Collected: 06/22/21 2102     Updated: 06/22/21 2110    Narrative:      CT ANGIOGRAM CHEST- 6/22/2021 8:31 PM CDT      HISTORY: Syncope, markedly elevated dimer      COMPARISON: CT scan dated 2/8/2021.      DOSE LENGTH PRODUCT: 445 mGy cm. Automated exposure control was also  utilized to decrease patient radiation dose.     TECHNIQUE: Helical tomographic images of the chest were obtained after  the administration of intravenous contrast following angiogram protocol.  Additionally, 3D and multiplanar reformatted images were provided.        FINDINGS:       Pulmonary arteries: There is adequate enhancement of the pulmonary  arteries to evaluate for central and segmental pulmonary emboli. There  are no filling defects within the main, lobar, segmental or visualized  subsegmental pulmonary arteries. The  pulmonary arteries are within  normal limits for size.      Aorta and great vessels: Thoracic aorta is normal in caliber. No  dissection identified. No flow-limiting stenosis identified at the great  vessel origins.     Visualized neck base: The imaged portion of the base of the neck appears  unremarkable.      Lungs: Mild interlobular septal thickening and peribronchial thickening.  No consolidation. No pleural effusion. The airways are clear.     Heart: Left heart enlargement. There is no pericardial effusion.  Advanced coronary artery atheromatous calcification. Left chest wall  pacer.     Mediastinum and lymph nodes: No suspicious hilar or mediastinal  adenopathy. Incidental granulomatous calcification..     Skeletal and soft tissues: Chest wall soft tissues are unremarkable. No  acute bony abnormality. Thoracic spine degenerative change..      Upper abdomen: The imaged portion of the upper abdomen demonstrates no  acute process. Cholelithiasis.       Impression:      1. No evidence of pulmonary embolus.  2. Left heart enlargement with advanced coronary atheromatous  calcification and what appears to be a minimal interstitial edema.        This report was finalized on 06/22/2021 21:07 by Dr Riki David, .    CT Cervical Spine Without Contrast [[id number]] Collected: 06/22/21 2059     Updated: 06/22/21 2105    Narrative:      CT CERVICAL SPINE WO CONTRAST- 6/22/2021 8:31 PM CDT     HISTORY: Mechanical fall leading to head trauma     COMPARISON: CT scan dated 2/8/2021      DOSE LENGTH PRODUCT: 291 mGy cm. Automated exposure control was also  utilized to decrease patient radiation dose.     TECHNIQUE: Serial helical tomographic images of the cervical spine were  obtained without the use of intravenous contrast. Additionally, sagittal  and coronal reformatted images were also provided for review.      FINDINGS:      The spine is visualized from the posterior fossa through T1. Normal  alignment across the  craniocervical junction.  Prior anterior spinal  fusion with C6 corpectomy and expandable cage. Retrolisthesis of C5  relative to the C6 level. No hardware complication identified. No acute  fractures seen. Vertebral body heights are maintained. The posterior  elements are intact. Spinal stenosis identified at the C5-C6 level.  Uncovertebral spurring with neuroforaminal narrowing at C5-C6 and C6-C7.  Calcified atheromatous plaque in the carotid bulbs. Lung apices are  clear.       Impression:      1. No evidence of acute osseous injury or traumatic malalignment in the  cervical spine.  2. Prior anterior spinal fusion with C6 corpectomy and expandable cage.  No hardware complication is seen.  3. Underlying listhesis and degenerative changes as described above.        This report was finalized on 06/22/2021 21:02 by Dr Riki David, .    CT Head Without Contrast [[id number]] Collected: 06/22/21 2057     Updated: 06/22/21 2102    Narrative:      CT HEAD WO CONTRAST- 6/22/2021 8:31 PM CDT     HISTORY: Mechanical fall leading to head trauma and persistent dizziness        DOSE LENGTH PRODUCT: 640 mGy cm. Automated exposure control was also  utilized to decrease patient radiation dose.     Technique:   Axial CT of the brain without IV contrast. Sagittal and coronal  reformations are also provided for review. Soft tissue and bone kernels  are available for interpretation.     Comparison: CT scan dated 2/8/2021.     Findings:      There is no evidence of acute large vascular territory infarct.  Underlying mild chronic small vessel ischemic change. No intra-axial or  extra-axial hemorrhage. No visualized mass lesion or mass effect. The  ventricles, cortical sulci and basal cisterns are symmetric and age  appropriate.  Posterior fossa structures are unremarkable. Left parietal  scalp contusion. No calvarial fracture. Visualized paranasal sinuses and  mastoids are clear.        Impression:      Impression:    1. No acute  intracranial process.  2. Left parietal scalp contusion. No underlying calvarial fracture.  This report was finalized on 06/22/2021 20:59 by Dr Riki David, .    XR Chest 1 View [[id number]] Collected: 06/22/21 1902     Updated: 06/22/21 1905    Narrative:      Frontal upright radiograph of the chest 6/22/2021 6:50 PM CDT     HISTORY: Mechanical fall     COMPARISON: Chest exam dated 3/22/2021.     FINDINGS:      No lung consolidation. No pleural effusion or pneumothorax. The  cardiomediastinal silhouette and pulmonary vascularity are within normal  limits. Stable left chest wall dual-chamber pacemaker. Prior anterior  cervical fusion. No acute bony abnormality.       Impression:      1. Stable chest exam without acute process.        This report was finalized on 06/22/2021 19:02 by Dr Riki David, .             ASSESSMENT/PLAN       Examination and evaluation of wound(s) was performed.    DIAGNOSIS:   Surgical wound of right greater trochanter  Obesity-BMI 32.54    PLAN:   Due to patient being discharged today with wound VAC waiting for her at her home by home health and she has appointment to follow-up with outpatient wound care, we will dressed the wound with a wet-to-dry dressing until she is able to get home to her wound VAC.      Start     Ordered    06/23/21 2000  Wound Care  Every 12 Hours     Question Answer Comment   Wound Locations Right Greater Trochanter    Wound Care Instructions Clean with NS.  Lightly fill open areas of wound with wet to dry dressing using NS soaked gauze.  Cover with ABD and medipore tape.    Cleanse Normal Saline    Dressing: Abdominal Pad        06/23/21 2208                  Discussed findings and treatment plan including risks, benefits, and treatment options with patient and sister in detail. Patient agreed with treatment plan.      This document has been electronically signed by DANIELA Mullins on 6/23/2021 14:49 CDT

## 2021-06-23 NOTE — ED PROVIDER NOTES
Subjective   Ms. Shin is a 65-year-old female with a history significant for coronary artery disease, MI and recent nursing home stay. She was at home today when she had a witnessed mechanical fall. Apparently she did get her feet stuck in a stool in the kitchen and tumbled to the floor resulting in significant head trauma. She denies loss of consciousness but in the ED she complains of significant vertiginous dizziness whenever she sits up and also more generalized dizziness. She denies preceding chest pain or palpitations. She denies extremity injury. She has a chronic back pain but nothing new. The other thing she states is that she had significant head trauma. Of note, her son a physician here, states that she does seem a little bit more confused and talked about her recently dead mother as though she was still alive.          Review of Systems   Neurological: Positive for dizziness.   Psychiatric/Behavioral: Positive for confusion.   All other systems reviewed and are negative.      Past Medical History:   Diagnosis Date   • Arthritis    • Asthma    • Chronic sinusitis    • Coronary artery disease    • Eustachian tube dysfunction    • Heart disease    • Herpes simplex    • Hyperlipidemia    • Hypertension    • Knee dislocation    • Labral tear of right hip joint    • Laryngitis sicca    • Laryngitis, chronic    • MI (myocardial infarction) (CMS/HCC)    • Otorrhea    • Sensorineural hearing loss    • Sjogren's disease (CMS/HCC)        Allergies   Allergen Reactions   • Atorvastatin Other (See Comments)     LEG CRAMPS     • Amoxicillin Rash   • Escitalopram Rash   • Nabumetone Rash   • Niacin Er Rash   • Penicillins Rash   • Simvastatin Rash       Past Surgical History:   Procedure Laterality Date   • A-V CARDIAC PACEMAKER INSERTION  2016   • ATRIAL CARDIAC PACEMAKER INSERTION     • CARDIAC CATHETERIZATION     • CATARACT EXTRACTION     • CERVICAL CORPECTOMY N/A 3/3/2021    Procedure: CERVICAL 6 CORPECTOMY WITH  TITANIUM CAGE WITH NEURO MONITORING;  Surgeon: Bandar Shea MD;  Location:  [facility];  Service: Neurosurgery;  Laterality: N/A;   • COLONOSCOPY  11/08/2011    One fold in the ascending colon which showed ulcer otherwise normal exam   • COLONOSCOPY  11/12/2004    Normal exam repeat in five years   • CORONARY ANGIOPLASTY WITH STENT PLACEMENT      X 2; 2013 & 2014   • ENDOSCOPY  07/10/2014    Normal exam   • HIP ABDUCTION TENOTOMY BILATERAL Right 1/14/2021    Procedure: RIGHT HIP GLUTEUS MEDLUS / MINIMUS REPAIR, POSSIBLE ACHILLES ALLOGRAFT;  Surgeon: Nino Carlson MD;  Location:  [facility];  Service: Orthopedics;  Laterality: Right;   • INCISION AND DRAINAGE HIP Right 2/9/2021    Procedure: HIP INCISION AND DRAINAGE;  Surgeon: Nino Carlson MD;  Location:  [facility];  Service: Orthopedics;  Laterality: Right;   • JOINT REPLACEMENT     • LUMBAR DISCECTOMY Right 3/23/2021    Procedure: LUMBAR DISCECTOMY MICRO, Lumbar 1/2 right;  Surgeon: Bandar Shea MD;  Location:  [facility];  Service: Neurosurgery;  Laterality: Right;   • LUMBAR FUSION N/A 1/19/2018    Procedure: L3-4,L4-5 DECOMPRESSION, POSTERIOR SPINAL FUSION WITH INSTRUMENTATION;  Surgeon: Fortino Oropeza MD;  Location:  [facility];  Service:    • LUMBAR LAMINECTOMY WITH FUSION Left 1/17/2018    Procedure: LEFT L3-4 L4-5 LATERAL LUMBAR INTERBODY FUSION;  Surgeon: Fortino Oropeza MD;  Location:  [facility];  Service:    • MYRINGOTOMY W/ TUBES  09/04/2014    TUBES NO LONGER IN PLACE   • OTHER SURGICAL HISTORY      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 so hardware was removed and spacers were placed   • REPLACEMENT TOTAL KNEE Right        Family History   Problem Relation Age of Onset   • Cancer Mother    • Heart disease Father        Social History     Socioeconomic History   • Marital status:      Spouse name: Not on file   • Number of children: Not on file   • Years of education: Not on file   • Highest education level: Not on file   Tobacco Use    • Smoking status: Never Smoker   • Smokeless tobacco: Never Used   Substance and Sexual Activity   • Alcohol use: No   • Drug use: Never   • Sexual activity: Defer           Objective   Physical Exam  Vitals and nursing note reviewed.   Constitutional:       Appearance: She is well-developed.   HENT:      Head: Normocephalic and atraumatic.      Right Ear: External ear normal.      Left Ear: External ear normal.      Nose: Nose normal.      Mouth/Throat:      Mouth: Mucous membranes are moist.      Pharynx: Oropharynx is clear.   Eyes:      Conjunctiva/sclera: Conjunctivae normal.   Cardiovascular:      Rate and Rhythm: Normal rate and regular rhythm.      Heart sounds: Normal heart sounds.   Pulmonary:      Effort: Pulmonary effort is normal.      Breath sounds: Normal breath sounds.   Abdominal:      General: Bowel sounds are normal.      Palpations: Abdomen is soft.   Musculoskeletal:         General: Normal range of motion.      Cervical back: Normal range of motion and neck supple.   Skin:     General: Skin is warm and dry.      Capillary Refill: Capillary refill takes less than 2 seconds.   Neurological:      Mental Status: She is alert and oriented to person, place, and time.   Psychiatric:         Behavior: Behavior normal.         Thought Content: Thought content normal.         Judgment: Judgment normal.         Procedures           ED Course                                           MDM  Number of Diagnoses or Management Options     Amount and/or Complexity of Data Reviewed  Clinical lab tests: ordered and reviewed  Tests in the radiology section of CPT®: ordered and reviewed  Decide to obtain previous medical records or to obtain history from someone other than the patient: yes    Patient Progress  Patient progress: stable      Final diagnoses:   Concussion without loss of consciousness, initial encounter   Elevated troponin       ED Disposition  ED Disposition     ED Disposition Condition Comment     Decision to Admit  Level of Care: Med/Surg [1]   Diagnosis: Concussion without loss of consciousness, initial encounter [[id number]]   Admitting Physician: RODERICK WHITMAN [[id number]]   Attending Physician: RODERICK WHITMAN [[id number]]            No follow-up provider specified.       Medication List      No changes were made to your prescriptions during this visit.       The patient's work-up was significant for an initial troponin that was positive with a value of 0.048 but then somewhat confusing the a second troponin that was less than 0.010. I discussed this with Dr. Freeman because of her history of MI and coronary artery disease in the past we decided that it would be safest to observe her overnight. She also appears to have quite a concussion so it would probably behoove us to observe her from that perspective as well. Currently the patient is stable in the ED.     Henry Ferguson MD  06/22/21 [id number]

## 2021-06-23 NOTE — H&P
[facility] Medicine Services  HISTORY AND PHYSICAL    Date of Admission: 6/22/2021  Primary Care Physician: Davon Urrutia MD    Subjective     Chief Complaint: Mechanical fall with injury    History of Present Illness  Tawny Shin is a 65-year-old female with a past medical history hypertension, hyper lipidemia, Sojourn's disease, coronary artery disease with MI, most recently diagnosed with iliopsoas abscess, discitis, osteomyelitis of lumbar spine, Staph aureus bacteremia, chronic pain syndrome.  Patient discharged from this facility 3/26/2021 having been admitted to neurosurgery for surgical intervention.  Patient has resided at [facility] since that time coming having been discharged to home approximately 2 weeks ago.  She was at her sister's house today when she turned while in the kitchen and tangled her feet in a stool and had a mechanical fall.  She did hit her head with hematoma noted left occipital region.  Her son noted mild confusion therefore due to concerns for concussion patient is admitted for observation to assure safety.  Currently she is scoring headache 7 on a scale of 1-10.  She initially had elevated troponin however that has now normalized.  Mild hypokalemia noted.  Patient does have chronic anemia.  There is no acute injuries noted on imaging.    Review of Systems   A 10 point review of system was completed, all negative except for those discussed in HPI    Past Medical History:   Past Medical History:   Diagnosis Date   • Arthritis    • Asthma    • Chronic sinusitis    • Coronary artery disease    • Eustachian tube dysfunction    • Heart disease    • Herpes simplex    • Hyperlipidemia    • Hypertension    • Knee dislocation    • Labral tear of right hip joint    • Laryngitis sicca    • Laryngitis, chronic    • MI (myocardial infarction) (CMS/[facility])    • Otorrhea    • Sensorineural hearing loss    • Sjogren's disease (CMS/[facility])         Past Surgical History:   Past Surgical History:   Procedure Laterality Date   • A-V CARDIAC PACEMAKER INSERTION  2016   • ATRIAL CARDIAC PACEMAKER INSERTION     • CARDIAC CATHETERIZATION     • CATARACT EXTRACTION     • CERVICAL CORPECTOMY N/A 3/3/2021    Procedure: CERVICAL 6 CORPECTOMY WITH TITANIUM CAGE WITH NEURO MONITORING;  Surgeon: Bandar Shea MD;  Location:  [facility] OR;  Service: Neurosurgery;  Laterality: N/A;   • COLONOSCOPY  11/08/2011    One fold in the ascending colon which showed ulcer otherwise normal exam   • COLONOSCOPY  11/12/2004    Normal exam repeat in five years   • CORONARY ANGIOPLASTY WITH STENT PLACEMENT      X 2; 2013 & 2014   • ENDOSCOPY  07/10/2014    Normal exam   • HIP ABDUCTION TENOTOMY BILATERAL Right 1/14/2021    Procedure: RIGHT HIP GLUTEUS MEDLUS / MINIMUS REPAIR, POSSIBLE ACHILLES ALLOGRAFT;  Surgeon: Nino Carlson MD;  Location:  [facility] OR;  Service: Orthopedics;  Laterality: Right;   • INCISION AND DRAINAGE HIP Right 2/9/2021    Procedure: HIP INCISION AND DRAINAGE;  Surgeon: Nino Carlson MD;  Location:  [facility] OR;  Service: Orthopedics;  Laterality: Right;   • JOINT REPLACEMENT     • LUMBAR DISCECTOMY Right 3/23/2021    Procedure: LUMBAR DISCECTOMY MICRO, Lumbar 1/2 right;  Surgeon: Badnar Shea MD;  Location:  [facility] OR;  Service: Neurosurgery;  Laterality: Right;   • LUMBAR FUSION N/A 1/19/2018    Procedure: L3-4,L4-5 DECOMPRESSION, POSTERIOR SPINAL FUSION WITH INSTRUMENTATION;  Surgeon: Fortino Oropeza MD;  Location:  [facility] OR;  Service:    • LUMBAR LAMINECTOMY WITH FUSION Left 1/17/2018    Procedure: LEFT L3-4 L4-5 LATERAL LUMBAR INTERBODY FUSION;  Surgeon: Fortino Oropeza MD;  Location:  [facility] OR;  Service:    • MYRINGOTOMY W/ TUBES  09/04/2014    TUBES NO LONGER IN PLACE   • OTHER SURGICAL HISTORY      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 so hardware was removed and spacers were placed   • REPLACEMENT TOTAL KNEE Right        Family History: family  history includes Cancer in her mother; Heart disease in her father.    Social History:  reports that she has never smoked. She has never used smokeless tobacco. She reports that she does not drink alcohol and does not use drugs.    Code Status: Full, if unable speak for herself her Sister Skye Stone will speak for her      Allergies:  Allergies   Allergen Reactions   • Atorvastatin Other (See Comments)     LEG CRAMPS     • Amoxicillin Rash   • Escitalopram Rash   • Nabumetone Rash   • Niacin Er Rash   • Penicillins Rash   • Simvastatin Rash       Medications:  Prior to Admission medications    Medication Sig Start Date End Date Taking? Authorizing Provider   bisacodyl (DULCOLAX) 10 MG suppository Insert 1 suppository into the rectum Daily As Needed for Constipation. 3/5/21   Taiwo Prado APRN   budesonide (PULMICORT) 0.5 MG/2ML nebulizer solution Take 2 mL by nebulization 2 (Two) Times a Day As Needed (shortness of air). 3/5/21   Taiwo Prado APRN   carvedilol (COREG) 25 MG tablet Take 25 mg by mouth 2 (Two) Times a Day With Meals.    Leila Daly MD   cyclobenzaprine (FLEXERIL) 10 MG tablet Take 1 tablet by mouth 3 (Three) Times a Day As Needed for Muscle Spasms. 3/5/21   Taiwo Prado APRN   docusate sodium 100 MG capsule Take 1 capsule by mouth 2 (Two) Times a Day.  Patient taking differently: Take 100 mg by mouth Daily. 3/5/21   Taiwo Prado APRN   famotidine (Pepcid) 40 MG tablet Take 1 tablet by mouth Daily. 6/22/21   Davon Urrutia MD   fluticasone (FLONASE) 50 MCG/ACT nasal spray 1 spray into the nostril(s) as directed by provider Daily. 5/1/21   Leila Daly MD   furosemide (LASIX) 40 MG tablet Take 40 mg by mouth Daily.    Leila Daly MD   gabapentin (NEURONTIN) 100 MG capsule Take 1 capsule by mouth Every 12 (Twelve) Hours. 6/9/21   Davon Urrutia MD   gabapentin (NEURONTIN) 300 MG capsule Take 300 mg by mouth. 4/12/21 5/12/21  Leila Daly MD   guaiFENesin  (MUCINEX) 600 MG 12 hr tablet Take 1 tablet by mouth Every 12 (Twelve) Hours. 3/26/21   Taiwo Prado APRN   isosorbide mononitrate (IMDUR) 60 MG 24 hr tablet Take 1 tablet by mouth 2 (Two) Times a Day. 3/26/21   Taiwo Prado APRN   levothyroxine (SYNTHROID, LEVOTHROID) 75 MCG tablet Take 1 tablet by mouth Daily. 6/22/21   Davon Urrutia MD   magnesium hydroxide (MILK OF MAGNESIA) 2400 MG/10ML suspension suspension Take 10 mL by mouth Daily As Needed.    ProviderLeila MD   magnesium oxide (MAG-OX) 400 MG tablet Take 400 mg by mouth. 4/12/21   ProviderLeila MD   multivitamin with minerals tablet tablet Take 1 tablet by mouth Daily. 3/6/21   Taiwo Prado APRN   Naloxegol Oxalate (Movantik) 25 MG tablet Take 1 tablet by mouth Every Morning. 6/22/21   Davon Urrutia MD   naloxone (NARCAN) 0.4 MG/ML injection Infuse 0.25 mL into a venous catheter Every 5 (Five) Minutes As Needed for Opioid Reversal or Respiratory Depression (see administration instructions). 3/5/21   Taiwo Prado APRN   nitroglycerin (Nitrostat) 0.4 MG SL tablet Place 1 tablet under the tongue Every 5 (Five) Minutes As Needed for Chest Pain. Take no more than 3 doses in 15 minutes. 3/26/21   Taiwo Prado APRN   ondansetron (ZOFRAN) 4 MG tablet Take 1 tablet by mouth Every 6 (Six) Hours As Needed for Nausea or Vomiting. 3/5/21   Taiwo Prado APRN   oxyCODONE (ROXICODONE) 15 MG immediate release tablet Take 1 tablet by mouth Every 12 (Twelve) Hours. 6/22/21   Davon Urrutia MD   oxyCODONE (ROXICODONE) 15 MG immediate release tablet Take 1 tablet by mouth Every 12 (Twelve) Hours. 7/21/21   Davon Urrutia MD   pantoprazole (PROTONIX) 40 MG EC tablet Take 1 tablet by mouth Daily. 6/22/21   Davon Urrutia MD   polyethylene glycol (polyethylene glycol) 17 g packet Take 17 g by mouth Daily. 3/6/21   Taiwo Prado APRN   potassium chloride (K-DUR,KLOR-CON) 20 MEQ CR tablet Take 20 mEq by mouth. 4/12/21   Provider,  "MD Leila   predniSONE (DELTASONE) 1 MG tablet Take 2 tablets by mouth Daily. 3/27/21   Taiwo Prado APRN   promethazine (PHENERGAN) 25 MG tablet Take 25 mg by mouth Every 6 (Six) Hours As Needed for Nausea or Vomiting.    Provider, Historical, MD   Saccharomyces boulardii (FLORASTOR PO) Take 250 mg by mouth Daily. 5/1/21   ProviderLeila MD   salsalate (DISALCID) 500 MG tablet Take 1 tablet by mouth 4 (Four) Times a Day. 6/22/21   Davon Urrutia MD   traMADol (ULTRAM) 50 MG tablet Take 50 mg by mouth Every 6 (Six) Hours As Needed for Moderate Pain .    Provider, MD Leila   valACYclovir (VALTREX) 500 MG tablet Take 1 tablet by mouth Daily. Indications: chronic suppressive therapy 6/22/21   Davon Urrutia MD       Objective     /54   Pulse 83   Temp 97.9 °F (36.6 °C) (Oral)   Resp 18   Ht 167.6 cm (66\")   Wt 88 kg (194 lb)   SpO2 91%   BMI 31.31 kg/m²   Physical Exam  Vitals reviewed.   Constitutional:       Appearance: She is obese.   HENT:      Head: Normocephalic. Contusion (hematoma) present.        Mouth/Throat:      Mouth: Mucous membranes are dry.      Pharynx: Oropharynx is clear.   Eyes:      Extraocular Movements: Extraocular movements intact.      Pupils: Pupils are equal, round, and reactive to light.   Cardiovascular:      Rate and Rhythm: Normal rate and regular rhythm.   Pulmonary:      Effort: Pulmonary effort is normal.      Breath sounds: Normal breath sounds.   Abdominal:      Palpations: Abdomen is soft.      Comments: Protuberant    Musculoskeletal:      Cervical back: Normal range of motion and neck supple.      Right lower leg: Edema (pitting) present.      Left lower leg: Edema (pitting) present.      Comments: Generalized weakness and debility   Skin:     General: Skin is warm and dry.   Neurological:      General: No focal deficit present.      Mental Status: She is alert and oriented to person, place, and time.   Psychiatric:         Mood and Affect: " Mood normal.         Behavior: Behavior normal.       Pertinent Data:   Lab Results (last 72 hours)     Procedure Component Value Units Date/Time    COVID-19,[facility] IN-HOUSE,Nasal Swab No Transport Media 3-4 HR TAT - Swab, Nasal Cavity [[id number]]  (Normal) Collected: 06/22/21 2204    Specimen: Swab from Nasal Cavity Updated: 06/22/21 2248     COVID19 Not Detected    Troponin [[id number]]  (Normal) Collected: 06/22/21 2054    Specimen: Blood Updated: 06/22/21 2120     Troponin T <0.010 ng/mL     aPTT [[id number]]  (Abnormal) Collected: 06/22/21 1857    Specimen: Blood Updated: 06/22/21 1931     PTT 37.1 seconds     Protime-INR [[id number]]  (Abnormal) Collected: 06/22/21 1857    Specimen: Blood Updated: 06/22/21 1931     Protime 15.1 Seconds      INR 1.29    D-dimer, Quantitative [[id number]]  (Abnormal) Collected: 06/22/21 1857    Specimen: Blood Updated: 06/22/21 1931     D-Dimer, Quantitative 7.69 mg/L (FEU)     Troponin [[id number]]  (Abnormal) Collected: 06/22/21 1857    Specimen: Blood Updated: 06/22/21 1927     Troponin T 0.048 ng/mL     Comprehensive Metabolic Panel [[id number]]  (Abnormal) Collected: 06/22/21 1857    Specimen: Blood Updated: 06/22/21 1924     Glucose 131 mg/dL      BUN 13 mg/dL      Creatinine 0.86 mg/dL      Sodium 140 mmol/L      Potassium 3.4 mmol/L      Comment: Slight hemolysis detected by analyzer. Results may be affected.        Chloride 100 mmol/L      CO2 30.0 mmol/L      Calcium 8.6 mg/dL      Total Protein 6.4 g/dL      Albumin 2.80 g/dL      ALT (SGPT) 6 U/L      AST (SGOT) 24 U/L      Alkaline Phosphatase 92 U/L      Total Bilirubin 0.3 mg/dL      eGFR Non African Amer 66 mL/min/1.73      Globulin 3.6 gm/dL      A/G Ratio 0.8 g/dL      BUN/Creatinine Ratio 15.1     Anion Gap 10.0 mmol/L     CBC Auto Differential [[id number]]  (Abnormal) Collected: 06/22/21 1857    Specimen: Blood Updated: 06/22/21 1906     WBC 5.34 10*3/mm3      RBC 3.40 10*6/mm3      Hemoglobin 9.7 g/dL       Hematocrit 30.6 %      MCV 90.0 fL      MCH 28.5 pg      MCHC 31.7 g/dL      RDW 15.2 %      RDW-SD 50.6 fl      MPV 9.5 fL      Platelets 230 10*3/mm3      Neutrophil % 52.9 %      Lymphocyte % 24.9 %      Monocyte % 15.5 %      Eosinophil % 5.2 %      Basophil % 1.1 %      Immature Grans % 0.4 %      Neutrophils, Absolute 2.82 10*3/mm3      Lymphocytes, Absolute 1.33 10*3/mm3      Monocytes, Absolute 0.83 10*3/mm3      Eosinophils, Absolute 0.28 10*3/mm3      Basophils, Absolute 0.06 10*3/mm3      Immature Grans, Absolute 0.02 10*3/mm3      nRBC 0.0 /100 WBC         Imaging Results (Last 24 Hours)     Procedure Component Value Units Date/Time    CT Angiogram Chest [[id number]] Collected: 06/22/21 2102     Updated: 06/22/21 2110    Narrative:      CT ANGIOGRAM CHEST- 6/22/2021 8:31 PM CDT      HISTORY: Syncope, markedly elevated dimer      COMPARISON: CT scan dated 2/8/2021.      DOSE LENGTH PRODUCT: 445 mGy cm. Automated exposure control was also  utilized to decrease patient radiation dose.     TECHNIQUE: Helical tomographic images of the chest were obtained after  the administration of intravenous contrast following angiogram protocol.  Additionally, 3D and multiplanar reformatted images were provided.        FINDINGS:       Pulmonary arteries: There is adequate enhancement of the pulmonary  arteries to evaluate for central and segmental pulmonary emboli. There  are no filling defects within the main, lobar, segmental or visualized  subsegmental pulmonary arteries. The pulmonary arteries are within  normal limits for size.      Aorta and great vessels: Thoracic aorta is normal in caliber. No  dissection identified. No flow-limiting stenosis identified at the great  vessel origins.     Visualized neck base: The imaged portion of the base of the neck appears  unremarkable.      Lungs: Mild interlobular septal thickening and peribronchial thickening.  No consolidation. No pleural effusion. The airways are clear.      Heart: Left heart enlargement. There is no pericardial effusion.  Advanced coronary artery atheromatous calcification. Left chest wall  pacer.     Mediastinum and lymph nodes: No suspicious hilar or mediastinal  adenopathy. Incidental granulomatous calcification..     Skeletal and soft tissues: Chest wall soft tissues are unremarkable. No  acute bony abnormality. Thoracic spine degenerative change..      Upper abdomen: The imaged portion of the upper abdomen demonstrates no  acute process. Cholelithiasis.       Impression:      1. No evidence of pulmonary embolus.  2. Left heart enlargement with advanced coronary atheromatous  calcification and what appears to be a minimal interstitial edema.        This report was finalized on 06/22/2021 21:07 by Dr Riki David, .    CT Cervical Spine Without Contrast [[id number]] Collected: 06/22/21 2059     Updated: 06/22/21 2105    Narrative:      CT CERVICAL SPINE WO CONTRAST- 6/22/2021 8:31 PM CDT     HISTORY: Mechanical fall leading to head trauma     COMPARISON: CT scan dated 2/8/2021      DOSE LENGTH PRODUCT: 291 mGy cm. Automated exposure control was also  utilized to decrease patient radiation dose.     TECHNIQUE: Serial helical tomographic images of the cervical spine were  obtained without the use of intravenous contrast. Additionally, sagittal  and coronal reformatted images were also provided for review.      FINDINGS:      The spine is visualized from the posterior fossa through T1. Normal  alignment across the craniocervical junction.  Prior anterior spinal  fusion with C6 corpectomy and expandable cage. Retrolisthesis of C5  relative to the C6 level. No hardware complication identified. No acute  fractures seen. Vertebral body heights are maintained. The posterior  elements are intact. Spinal stenosis identified at the C5-C6 level.  Uncovertebral spurring with neuroforaminal narrowing at C5-C6 and C6-C7.  Calcified atheromatous plaque in the carotid bulbs. Lung  apices are  clear.       Impression:      1. No evidence of acute osseous injury or traumatic malalignment in the  cervical spine.  2. Prior anterior spinal fusion with C6 corpectomy and expandable cage.  No hardware complication is seen.  3. Underlying listhesis and degenerative changes as described above.        This report was finalized on 06/22/2021 21:02 by Dr Riki David, .    CT Head Without Contrast [[id number]] Collected: 06/22/21 2057     Updated: 06/22/21 2102    Narrative:      CT HEAD WO CONTRAST- 6/22/2021 8:31 PM CDT     HISTORY: Mechanical fall leading to head trauma and persistent dizziness        DOSE LENGTH PRODUCT: 640 mGy cm. Automated exposure control was also  utilized to decrease patient radiation dose.     Technique:   Axial CT of the brain without IV contrast. Sagittal and coronal  reformations are also provided for review. Soft tissue and bone kernels  are available for interpretation.     Comparison: CT scan dated 2/8/2021.     Findings:      There is no evidence of acute large vascular territory infarct.  Underlying mild chronic small vessel ischemic change. No intra-axial or  extra-axial hemorrhage. No visualized mass lesion or mass effect. The  ventricles, cortical sulci and basal cisterns are symmetric and age  appropriate.  Posterior fossa structures are unremarkable. Left parietal  scalp contusion. No calvarial fracture. Visualized paranasal sinuses and  mastoids are clear.        Impression:      Impression:    1. No acute intracranial process.  2. Left parietal scalp contusion. No underlying calvarial fracture.  This report was finalized on 06/22/2021 20:59 by Dr Riki David, .    XR Chest 1 View [[id number]] Collected: 06/22/21 1902     Updated: 06/22/21 1905    Narrative:      Frontal upright radiograph of the chest 6/22/2021 6:50 PM CDT     HISTORY: Mechanical fall     COMPARISON: Chest exam dated 3/22/2021.     FINDINGS:      No lung consolidation. No pleural effusion or  pneumothorax. The  cardiomediastinal silhouette and pulmonary vascularity are within normal  limits. Stable left chest wall dual-chamber pacemaker. Prior anterior  cervical fusion. No acute bony abnormality.       Impression:      1. Stable chest exam without acute process.        This report was finalized on 06/22/2021 19:02 by Dr Riki David, .          Assessment / Plan     Assessment:   Active Hospital Problems    Diagnosis    • **Concussion with no loss of consciousness    • Fall as cause of accidental injury at home as place of occurrence    • Chronic pain syndrome    • Hypokalemia    • Bilateral lower extremity edema    • Headache        Plan:   1.  Admit as observation  2.  Home medications reviewed and restarted as appropriate  3.  DVT prophylaxis with SCDs  4.  20 mg Lasix IV, external catheter  5.  Labs in a.m.  6.  Replace potassium    I discussed the patient's findings and my recommendations with: Senthil Paul MD  Time spent 35 minutes    Patient seen and examined by me on 6/22/2021 at 9:45 pm.    Electronically signed by DANIELA Miranda, 06/22/21, 23:22 CDT.    I personally evaluated and examined the patient in conjunction with DANIELA Myles and agree with the assessment, treatment plan, and disposition of the patient as recorded by her. My history, exam, and further recommendations are:   Patient seen in room and examined at time of admission.  Apparently she got her feet tripped up in a chair and fell she did not lose consciousness her dizziness and other symptoms are improving significantly she seems to be back to baseline she is alert and oriented x4.  Probably okay to discharge in the morning.    Electronically signed by Senthil Paul MD, 6/23/2021, 05:52 CDT.

## 2021-06-23 NOTE — PLAN OF CARE
Goal Outcome Evaluation:      Nutrition: Assessment completed. Pt is currently on cardiac regular diet. Insufficient PO intake at this time. RDN attempted to call pt x 2, no answer. Wounds noted added bene-protein BID to help increase protein intake to aid in wound healing. RDN will continue to follow pt.

## 2021-06-23 NOTE — DISCHARGE SUMMARY
"    [facility] Medicine Services  DISCHARGE SUMMARY       Date of Admission: 6/22/2021  Date of Discharge:  6/23/2021  Primary Care Physician: Davon Urrutia MD    Discharge Diagnoses:  Active Hospital Problems    Diagnosis    • **Concussion with no loss of consciousness    • Acute DVT (deep venous thrombosis) (CMS/[facility]), left    • Fall as cause of accidental injury at home as place of occurrence    • Chronic pain syndrome    • Hypokalemia    • Bilateral lower extremity edema    • Headache          Presenting Problem/History of Present Illness:  Concussion without loss of consciousness, initial encounter [S06.0X0A]         Hospital Course  She is a 65-year-old woman who was admitted overnight due to concussion after a mechanical fall at home.  She said that her foot got caught on a stool bar causing her to fall.  She said it was a \"slow\" fall.  She said it hurt when she hit her head on the floor.  She did not lose consciousness.  She denies any dizziness, visual disturbance, palpitation, chest pain, shortness of breath or difficulty breathing.  She denies currently any headache.  She had imaging studies including:  head CT scan showed no acute intracranial process, left parietal scalp contusion with no underlying calvarial fracture.  CT of cervical spine: No acute osseous injury or traumatic malalignment.  Previous anterior spinal fusion C6 corpectomy and expandable cage.  No hardware complication.  Underlying listhesis and degenerative changes mention.    She has an elevated D-dimer at 7.69.  She has prior history of DVT but currently not on any blood thinner.  She has swelling on her lower extremities bilateral and pain on palpation.  She was recently in skilled nursing facility.    Noted as well that she has big wound on right hip that is being packed with wet to dry dressing.  There is no gross discharge.  No gross foul-smelling other.  The bed of the wound looks red.  There is " no gross inflammatory signs surrounding the area.  White count is normal at 5.34.    Patient had CT angiogram of the chest which showed no evidence of pulmonary embolus.  There left heart is enlarged with advanced atheromatous calcification of the coronaries and what appears described to be minimal interstitial edema.  Noted that she received Lasix.    Given above findings, I will request for venous ultrasound of lower extremities to rule out presence of deep vein thrombosis.  If negative, I think it is reasonable just to continue on her Lasix which is listed in her home medication.    She had a previous transesophageal echocardiogram on February 12, 2021 which showed left ventricular systolic motion was normal, mild mitral valve regurgitation.  No evidence of vegetations on valves or pacemaker leads.  I spoke to her son  Casa Shin over the phone.  Patient reportedly has an elevated troponin but has trended to normal.  This is of unclear significance given a normal sinus rhythm with left fascicular block EKG findings without acute ST-T wave changes particularly in the absence of chest pain. This is unlikely of acute coronary syndrome.      Addendum:  Venous duplex ultrasound showed DVT and superficial vein thrombosis involving the common femoral vein, left proximal and distal portion of femoral vein, left popliteal vein, left peroneal vein and left SSV.    Patient informed of the above finding. She has not reported any prior history of bleeding.  We discussed rationale if her anticoagulation and adverse effect of medication. She is agreeable to anticoagulation.  Her sister was at bedside as I was explaining things to her.  Both of them verbalized adequate understanding.    Procedures Performed:   None    Consults:   None    Pertinent Test Results:   Lab Results (last 72 hours)     Procedure Component Value Units Date/Time    Basic Metabolic Panel [[id number]]  (Abnormal) Collected: 06/23/21 8303    Specimen:  Blood Updated: 06/23/21 0619     Glucose 91 mg/dL      BUN 11 mg/dL      Creatinine 0.80 mg/dL      Sodium 141 mmol/L      Potassium 3.8 mmol/L      Chloride 101 mmol/L      CO2 33.0 mmol/L      Calcium 8.5 mg/dL      eGFR Non African Amer 72 mL/min/1.73      BUN/Creatinine Ratio 13.8     Anion Gap 7.0 mmol/L     Narrative:      GFR Normal >60  Chronic Kidney Disease <60  Kidney Failure <15      CBC (No Diff) [[id number]]  (Abnormal) Collected: 06/23/21 0432    Specimen: Blood Updated: 06/23/21 0540     WBC 5.16 10*3/mm3      RBC 3.13 10*6/mm3      Hemoglobin 8.9 g/dL      Hematocrit 28.3 %      MCV 90.4 fL      MCH 28.4 pg      MCHC 31.4 g/dL      RDW 15.5 %      RDW-SD 50.3 fl      MPV 10.0 fL      Platelets 232 10*3/mm3     Urinalysis, Microscopic Only - Urine, Clean Catch [[id number]]  (Abnormal) Collected: 06/23/21 0141    Specimen: Urine, Clean Catch Updated: 06/23/21 0156     RBC, UA 3-5 /HPF      WBC, UA 6-12 /HPF      Bacteria, UA None Seen /HPF      Squamous Epithelial Cells, UA 13-20 /HPF      Hyaline Casts, UA 0-2 /LPF      Methodology Automated Microscopy    Urinalysis With Culture If Indicated - Urine, Clean Catch [[id number]]  (Abnormal) Collected: 06/23/21 0141    Specimen: Urine, Clean Catch Updated: 06/23/21 0156     Color, UA Yellow     Appearance, UA Clear     pH, UA 5.5     Specific Gravity, UA 1.024     Glucose, UA Negative     Ketones, UA Negative     Bilirubin, UA Negative     Blood, UA Trace     Protein, UA Negative     Leuk Esterase, UA Small (1+)     Nitrite, UA Negative     Urobilinogen, UA 0.2 E.U./dL    Urine Culture - Urine, Urine, Clean Catch [[id number]] Collected: 06/23/21 0141    Specimen: Urine, Clean Catch Updated: 06/23/21 0156    COVID PRE-OP / PRE-PROCEDURE SCREENING ORDER (NO ISOLATION) - Swab, Nasal Cavity [[id number]]  (Normal) Collected: 06/22/21 2204    Specimen: Swab from Nasal Cavity Updated: 06/22/21 2248    Narrative:      The following orders were created for panel order  COVID PRE-OP / PRE-PROCEDURE SCREENING ORDER (NO ISOLATION) - Swab, Nasal Cavity.  Procedure                               Abnormality         Status                     ---------                               -----------         ------                     COVID-19,[facility] IN-FLAKO...[[id number]]  Normal              Final result                 Please view results for these tests on the individual orders.    COVID-19,[facility] IN-HOUSE,Nasal Swab No Transport Media 3-4 HR TAT - Swab, Nasal Cavity [[id number]]  (Normal) Collected: 06/22/21 2204    Specimen: Swab from Nasal Cavity Updated: 06/22/21 2248     COVID19 Not Detected    Narrative:      Fact sheet for providers: https://www.fda.gov/media/094213/download     Fact sheet for patients: https://www.fda.gov/media/146090/download    Test performed by PCR.    Consider negative results in combination with clinical observations, patient history, and epidemiological information.    Troponin [[id number]]  (Normal) Collected: 06/22/21 2054    Specimen: Blood Updated: 06/22/21 2120     Troponin T <0.010 ng/mL     Narrative:      Troponin T Reference Range:  <= 0.03 ng/mL-   Negative for AMI  >0.03 ng/mL-     Abnormal for myocardial necrosis.  Clinicians would have to utilize clinical acumen, EKG, Troponin and serial changes to determine if it is an Acute Myocardial Infarction or myocardial injury due to an underlying chronic condition.       Results may be falsely decreased if patient taking Biotin.      [place] Draw [[id number]] Collected: 06/22/21 1857    Specimen: Blood Updated: 06/22/21 2000    Narrative:      The following orders were created for panel order [place] Draw.  Procedure                               Abnormality         Status                     ---------                               -----------         ------                     Green Top (Gel)[[id number]]                                  Final result               Lavender Top[[id number]]                                      Final result               Red Top[[id number]]                                          Final result                 Please view results for these tests on the individual orders.    Green Top (Gel) [[id number]] Collected: 06/22/21 1857    Specimen: Blood Updated: 06/22/21 2000     Extra Tube Hold for add-ons.     Comment: Auto resulted.       Red Top [[id number]] Collected: 06/22/21 1857    Specimen: Blood Updated: 06/22/21 2000     Extra Tube Hold for add-ons.     Comment: Auto resulted.       Lavender Top [[id number]] Collected: 06/22/21 1857    Specimen: Blood Updated: 06/22/21 2000     Extra Tube hold for add-on     Comment: Auto resulted       aPTT [[id number]]  (Abnormal) Collected: 06/22/21 1857    Specimen: Blood Updated: 06/22/21 1931     PTT 37.1 seconds     Protime-INR [[id number]]  (Abnormal) Collected: 06/22/21 1857    Specimen: Blood Updated: 06/22/21 1931     Protime 15.1 Seconds      INR 1.29    D-dimer, Quantitative [[id number]]  (Abnormal) Collected: 06/22/21 1857    Specimen: Blood Updated: 06/22/21 1931     D-Dimer, Quantitative 7.69 mg/L (FEU)     Narrative:      Reference Range is 0-0.50 mg/L FEU. However, results <0.50 mg/L FEU tends to rule out DVT or PE. Results >0.50 mg/L FEU are not useful in predicting absence or presence of DVT or PE.      Troponin [[id number]]  (Abnormal) Collected: 06/22/21 1857    Specimen: Blood Updated: 06/22/21 1927     Troponin T 0.048 ng/mL     Narrative:      Troponin T Reference Range:  <= 0.03 ng/mL-   Negative for AMI  >0.03 ng/mL-     Abnormal for myocardial necrosis.  Clinicians would have to utilize clinical acumen, EKG, Troponin and serial changes to determine if it is an Acute Myocardial Infarction or myocardial injury due to an underlying chronic condition.       Results may be falsely decreased if patient taking Biotin.      Comprehensive Metabolic Panel [[id number]]  (Abnormal) Collected: 06/22/21 1857    Specimen: Blood Updated: 06/22/21 1924      Glucose 131 mg/dL      BUN 13 mg/dL      Creatinine 0.86 mg/dL      Sodium 140 mmol/L      Potassium 3.4 mmol/L      Comment: Slight hemolysis detected by analyzer. Results may be affected.        Chloride 100 mmol/L      CO2 30.0 mmol/L      Calcium 8.6 mg/dL      Total Protein 6.4 g/dL      Albumin 2.80 g/dL      ALT (SGPT) 6 U/L      AST (SGOT) 24 U/L      Alkaline Phosphatase 92 U/L      Total Bilirubin 0.3 mg/dL      eGFR Non African Amer 66 mL/min/1.73      Globulin 3.6 gm/dL      A/G Ratio 0.8 g/dL      BUN/Creatinine Ratio 15.1     Anion Gap 10.0 mmol/L     Narrative:      GFR Normal >60  Chronic Kidney Disease <60  Kidney Failure <15      CBC & Differential [[id number]]  (Abnormal) Collected: 06/22/21 1857    Specimen: Blood Updated: 06/22/21 1906    Narrative:      The following orders were created for panel order CBC & Differential.  Procedure                               Abnormality         Status                     ---------                               -----------         ------                     CBC Auto Differential[[id number]]        Abnormal            Final result                 Please view results for these tests on the individual orders.    CBC Auto Differential [[id number]]  (Abnormal) Collected: 06/22/21 1857    Specimen: Blood Updated: 06/22/21 1906     WBC 5.34 10*3/mm3      RBC 3.40 10*6/mm3      Hemoglobin 9.7 g/dL      Hematocrit 30.6 %      MCV 90.0 fL      MCH 28.5 pg      MCHC 31.7 g/dL      RDW 15.2 %      RDW-SD 50.6 fl      MPV 9.5 fL      Platelets 230 10*3/mm3      Neutrophil % 52.9 %      Lymphocyte % 24.9 %      Monocyte % 15.5 %      Eosinophil % 5.2 %      Basophil % 1.1 %      Immature Grans % 0.4 %      Neutrophils, Absolute 2.82 10*3/mm3      Lymphocytes, Absolute 1.33 10*3/mm3      Monocytes, Absolute 0.83 10*3/mm3      Eosinophils, Absolute 0.28 10*3/mm3      Basophils, Absolute 0.06 10*3/mm3      Immature Grans, Absolute 0.02 10*3/mm3      nRBC 0.0 /100 WBC          Imaging Results (Last 48 Hours)     Procedure Component Value Units Date/Time    US Venous Doppler Lower Extremity Bilateral (duplex) [[id number]] Resulted: 06/23/21 1132     Updated: 06/23/21 1200    CT Angiogram Chest [[id number]] Collected: 06/22/21 2102     Updated: 06/22/21 2110    Narrative:      CT ANGIOGRAM CHEST- 6/22/2021 8:31 PM CDT      HISTORY: Syncope, markedly elevated dimer      COMPARISON: CT scan dated 2/8/2021.      DOSE LENGTH PRODUCT: 445 mGy cm. Automated exposure control was also  utilized to decrease patient radiation dose.     TECHNIQUE: Helical tomographic images of the chest were obtained after  the administration of intravenous contrast following angiogram protocol.  Additionally, 3D and multiplanar reformatted images were provided.        FINDINGS:       Pulmonary arteries: There is adequate enhancement of the pulmonary  arteries to evaluate for central and segmental pulmonary emboli. There  are no filling defects within the main, lobar, segmental or visualized  subsegmental pulmonary arteries. The pulmonary arteries are within  normal limits for size.      Aorta and great vessels: Thoracic aorta is normal in caliber. No  dissection identified. No flow-limiting stenosis identified at the great  vessel origins.     Visualized neck base: The imaged portion of the base of the neck appears  unremarkable.      Lungs: Mild interlobular septal thickening and peribronchial thickening.  No consolidation. No pleural effusion. The airways are clear.     Heart: Left heart enlargement. There is no pericardial effusion.  Advanced coronary artery atheromatous calcification. Left chest wall  pacer.     Mediastinum and lymph nodes: No suspicious hilar or mediastinal  adenopathy. Incidental granulomatous calcification..     Skeletal and soft tissues: Chest wall soft tissues are unremarkable. No  acute bony abnormality. Thoracic spine degenerative change..      Upper abdomen: The imaged portion of the  upper abdomen demonstrates no  acute process. Cholelithiasis.       Impression:      1. No evidence of pulmonary embolus.  2. Left heart enlargement with advanced coronary atheromatous  calcification and what appears to be a minimal interstitial edema.        This report was finalized on 06/22/2021 21:07 by Dr Riki David, .    CT Cervical Spine Without Contrast [[id number]] Collected: 06/22/21 2059     Updated: 06/22/21 2105    Narrative:      CT CERVICAL SPINE WO CONTRAST- 6/22/2021 8:31 PM CDT     HISTORY: Mechanical fall leading to head trauma     COMPARISON: CT scan dated 2/8/2021      DOSE LENGTH PRODUCT: 291 mGy cm. Automated exposure control was also  utilized to decrease patient radiation dose.     TECHNIQUE: Serial helical tomographic images of the cervical spine were  obtained without the use of intravenous contrast. Additionally, sagittal  and coronal reformatted images were also provided for review.      FINDINGS:      The spine is visualized from the posterior fossa through T1. Normal  alignment across the craniocervical junction.  Prior anterior spinal  fusion with C6 corpectomy and expandable cage. Retrolisthesis of C5  relative to the C6 level. No hardware complication identified. No acute  fractures seen. Vertebral body heights are maintained. The posterior  elements are intact. Spinal stenosis identified at the C5-C6 level.  Uncovertebral spurring with neuroforaminal narrowing at C5-C6 and C6-C7.  Calcified atheromatous plaque in the carotid bulbs. Lung apices are  clear.       Impression:      1. No evidence of acute osseous injury or traumatic malalignment in the  cervical spine.  2. Prior anterior spinal fusion with C6 corpectomy and expandable cage.  No hardware complication is seen.  3. Underlying listhesis and degenerative changes as described above.        This report was finalized on 06/22/2021 21:02 by Dr Riki David, .    CT Head Without Contrast [[id number]] Collected: 06/22/21 2057      Updated: 06/22/21 2102    Narrative:      CT HEAD WO CONTRAST- 6/22/2021 8:31 PM CDT     HISTORY: Mechanical fall leading to head trauma and persistent dizziness        DOSE LENGTH PRODUCT: 640 mGy cm. Automated exposure control was also  utilized to decrease patient radiation dose.     Technique:   Axial CT of the brain without IV contrast. Sagittal and coronal  reformations are also provided for review. Soft tissue and bone kernels  are available for interpretation.     Comparison: CT scan dated 2/8/2021.     Findings:      There is no evidence of acute large vascular territory infarct.  Underlying mild chronic small vessel ischemic change. No intra-axial or  extra-axial hemorrhage. No visualized mass lesion or mass effect. The  ventricles, cortical sulci and basal cisterns are symmetric and age  appropriate.  Posterior fossa structures are unremarkable. Left parietal  scalp contusion. No calvarial fracture. Visualized paranasal sinuses and  mastoids are clear.        Impression:      Impression:    1. No acute intracranial process.  2. Left parietal scalp contusion. No underlying calvarial fracture.  This report was finalized on 06/22/2021 20:59 by Dr Riki David, .    XR Chest 1 View [[id number]] Collected: 06/22/21 1902     Updated: 06/22/21 1905    Narrative:      Frontal upright radiograph of the chest 6/22/2021 6:50 PM CDT     HISTORY: Mechanical fall     COMPARISON: Chest exam dated 3/22/2021.     FINDINGS:      No lung consolidation. No pleural effusion or pneumothorax. The  cardiomediastinal silhouette and pulmonary vascularity are within normal  limits. Stable left chest wall dual-chamber pacemaker. Prior anterior  cervical fusion. No acute bony abnormality.       Impression:      1. Stable chest exam without acute process.        This report was finalized on 06/22/2021 19:02 by Dr Riki David, .        Condition on Discharge:   Stable  Physical Exam on Discharge:  /51 (BP Location: Left  "arm, Patient Position: Lying)   Pulse 77   Temp 98.7 °F (37.1 °C) (Oral)   Resp 16   Ht 167.6 cm (65.98\")   Wt 91.4 kg (201 lb 8 oz)   SpO2 94%   BMI 32.54 kg/m²   Physical Exam  She is neurologically intact  She has no gross limitation in her strength of all extremities  She is interactive and appropriate in affect  She has mild tenderness on left side of her scalp but no gross hematoma  Pupils equal reactive to light  Anicteric sclera, glassy appearance of left pupil  No gross opacity in her pupils  Supple neck  No thyromegaly  Moist oral mucosa  Lungs are clear to auscultation with good air exchange  S1-S2, regular rate and rhythm  Telemetry showed sinus rhythm 78-98  Soft abdomen, nontender, no organomegaly  No cyanosis  Positive pitting edema bilateral lower extremities with pain and tenderness  Big wound in right hip with packing.  No gross foul odor.  No gross discharge.  Red-pink to red wound bed without any gross signs of necrosis    Discharge Disposition:  Home or Self Care    Discharge Medications:     Discharge Medications      New Medications      Instructions Start Date   apixaban 5 MG tablet tablet  Commonly known as: ELIQUIS   10 mg, Oral, Every 12 Hours Scheduled         Changes to Medications      Instructions Start Date   docusate sodium 100 MG capsule  What changed: when to take this   100 mg, Oral, 2 Times Daily      oxyCODONE 15 MG immediate release tablet  Commonly known as: ROXICODONE  What changed: when to take this   15 mg, Oral, Every 12 Hours   Start Date: July 21, 2021        Continue These Medications      Instructions Start Date   bisacodyl 10 MG suppository  Commonly known as: DULCOLAX   10 mg, Rectal, Daily PRN      budesonide 0.5 MG/2ML nebulizer solution  Commonly known as: PULMICORT   0.5 mg, Nebulization, 2 Times Daily PRN      carvedilol 25 MG tablet  Commonly known as: COREG   25 mg, Oral, 2 Times Daily With Meals      cyclobenzaprine 10 MG tablet  Commonly known as: " FLEXERIL   10 mg, Oral, 3 Times Daily PRN      famotidine 40 MG tablet  Commonly known as: Pepcid   40 mg, Oral, Daily      FLORASTOR PO   250 mg, Oral, Daily      fluticasone 50 MCG/ACT nasal spray  Commonly known as: FLONASE   1 spray, Nasal, Daily      furosemide 40 MG tablet  Commonly known as: LASIX   40 mg, Oral, Daily      gabapentin 300 MG capsule  Commonly known as: NEURONTIN   300 mg, Oral, Nightly      gabapentin 100 MG capsule  Commonly known as: NEURONTIN   100 mg, Oral, Every 12 Hours Scheduled      guaiFENesin 600 MG 12 hr tablet  Commonly known as: MUCINEX   600 mg, Oral, Every 12 Hours Scheduled      isosorbide mononitrate 60 MG 24 hr tablet  Commonly known as: IMDUR   60 mg, Oral, 2 Times Daily      levothyroxine 75 MCG tablet  Commonly known as: SYNTHROID, LEVOTHROID   75 mcg, Oral, Daily      lidocaine 5 %  Commonly known as: LIDODERM   1 patch, Transdermal, Every 24 Hours, Remove & Discard patch within 12 hours or as directed by MD       magnesium hydroxide 2400 MG/10ML suspension suspension  Commonly known as: MILK OF MAGNESIA   10 mL, Oral, Daily PRN      magnesium oxide 400 MG tablet  Commonly known as: MAG-OX   400 mg, Oral      Movantik 25 MG tablet  Generic drug: Naloxegol Oxalate   25 mg, Oral, Every Morning      multivitamin with minerals tablet tablet   1 tablet, Oral, Daily      naloxone 0.4 MG/ML injection  Commonly known as: NARCAN   0.1 mg, Intravenous, Every 5 Minutes PRN      nitroglycerin 0.4 MG SL tablet  Commonly known as: Nitrostat   0.4 mg, Sublingual, Every 5 Minutes PRN, Take no more than 3 doses in 15 minutes.      ondansetron 4 MG tablet  Commonly known as: ZOFRAN   4 mg, Oral, Every 6 Hours PRN      pantoprazole 40 MG EC tablet  Commonly known as: PROTONIX   40 mg, Oral, Daily      potassium chloride 20 MEQ CR tablet  Commonly known as: K-DUR,KLOR-CON   20 mEq, Oral, Daily      promethazine 25 MG tablet  Commonly known as: PHENERGAN   25 mg, Oral, Every 6 Hours PRN       salsalate 500 MG tablet  Commonly known as: DISALCID   500 mg, Oral, 4 Times Daily      sucralfate 1 g tablet  Commonly known as: CARAFATE   1 g, Oral, 4 Times Daily Before Meals & Nightly      traMADol 50 MG tablet  Commonly known as: ULTRAM   50 mg, Oral, 3 Times Daily      valACYclovir 500 MG tablet  Commonly known as: VALTREX   500 mg, Oral, Every 24 Hours Scheduled             Discharge Diet:   Diet Instructions     Diet: Regular, Cardiac      Discharge Diet:  Regular  Cardiac                 Activity at Discharge:   Activity Instructions     Gradually Increase Activity Until at Pre-Hospitalization Level            Follow-up Appointments:   PCP with 1 wk; facilitate care on wound; post hospitalization s/p fall and recent diagnosis of dvt LLE    Test Results Pending at Discharge:   Pending Labs     Order Current Status    Urine Culture - Urine, Urine, Clean Catch In process           Electronically signed by Jun Hawley MD, 6/23/2021, 14:09 CDT.    Time: > 30 mins        Part of this note may be an electronic transcription/translation of spoken language to printed text using the Dragon Dictation System.

## 2021-06-23 NOTE — PLAN OF CARE
Goal Outcome Evaluation:  Plan of Care Reviewed With: patient        Progress: no change  Outcome Summary: Received pt from ER this shift. Pt A&Ox4. VSS. Has not reported a headache but has reported R hip pain. Also reports BLE pain, which do look slightly red and edematous. For this reason pt is refusing MD Margarito aware. NSR on telemetry. Lasix given as ordered, purewick in place and pt is voiding. Does report dizziness with position changes. Safety maintained, will continue to monitor.

## 2021-06-24 ENCOUNTER — TELEPHONE (OUTPATIENT)
Dept: INTERNAL MEDICINE | Facility: CLINIC | Age: 66
End: 2021-06-24

## 2021-06-24 ENCOUNTER — TRANSITIONAL CARE MANAGEMENT TELEPHONE ENCOUNTER (OUTPATIENT)
Dept: CALL CENTER | Facility: HOSPITAL | Age: 66
End: 2021-06-24

## 2021-06-24 LAB
BACTERIA SPEC AEROBE CULT: ABNORMAL
BACTERIA SPEC AEROBE CULT: NORMAL
GRAM STN SPEC: ABNORMAL
GRAM STN SPEC: ABNORMAL
QT INTERVAL: 398 MS
QTC INTERVAL: 476 MS

## 2021-06-24 NOTE — TELEPHONE ENCOUNTER
Went to hospital ([facility], [place], operated by [facility]) Tuesday night due to fall and hit her head.  Hospitalist said she had a blood clot in left leg.    He told her that she needed to have Eliquis so she is wanting Dr. Urrutia to fill this because he  Was only a hospitalist.      Wanting script just called  to [facility]

## 2021-06-24 NOTE — OUTREACH NOTE
Call Center TCM Note      Responses   [facility] patient discharged from?  [place]   Does the patient have one of the following disease processes/diagnoses(primary or secondary)?  Other   TCM attempt successful?  No   Unsuccessful attempts  Attempt 1          Radha Perez RN    6/24/2021, 14:12 EDT

## 2021-06-24 NOTE — TELEPHONE ENCOUNTER
Reviewed chart and Eliquis Rx was sent to pharmacy yesterday, but not until 2:00 and her sister went to pharmacy before lunch, so they will check back with them today.  Advised it was only a 1 week supply, so needs a hfu here next week for ongoing Rx.  We scheduled a hfu with her while she was on the phone.

## 2021-06-24 NOTE — OUTREACH NOTE
Prep Survey      Responses   [facility] patient discharged from?  [place]   Is LACE score < 7 ?  No   Emergency Room discharge w/ pulse ox?  No   Eligibility  [facility]   Date of Admission  06/22/21   Date of Discharge  06/23/21   Discharge Disposition  Home or Self Care   Discharge diagnosis  Concussion from fall   Does the patient have one of the following disease processes/diagnoses(primary or secondary)?  Other   Does the patient have Home health ordered?  No   Is there a DME ordered?  No   Prep survey completed?  Yes          Анна Coleman, RN

## 2021-06-24 NOTE — TELEPHONE ENCOUNTER
Pt called stating that the Eliquis prescribed yesterday requires a PA, verified with Jackeline.  Obtained PA on covermymeds and called Jackeline HERMAN back and he ran it thru to make sure no issues.  Pt informed Rx is ready to  this afternoon and get started on ASAP.  She voiced understanding.

## 2021-06-24 NOTE — PLAN OF CARE
Goal Outcome Evaluation:  Plan of Care Reviewed With: patient        Progress: no change  Outcome Summary: Pt A&Ox4. PPP. BLE red, mild edema. Eliquis, VTE. Denies n/t. C/o of little to no pain, scheduled pain meds given per order. R hip drsg changed per order. Tele - normal sinus. , room air. Purewick in place, CDI, cath care completed. No c/o HA. Pt refuses SCD - MD aware, refusal form signed. Call light within reach. Safety maintained. Plan to DC home today. Home health to see pt for WC.

## 2021-06-24 NOTE — OUTREACH NOTE
Call Center TCM Note      Responses   [facility] patient discharged from?  [place]   Does the patient have one of the following disease processes/diagnoses(primary or secondary)?  Other   TCM attempt successful?  Yes   Call start time  1530   Call end time  1532   Discharge diagnosis  Concussion from fall   Does the patient have all medications ordered at discharge?  No   What is keeping the patient from filling the prescriptions?  Prior authorization Issues   Is the patient taking all medications as directed (includes completed medication regime)?  N/A   Does the patient have a primary care provider?   Yes   Does the patient have an appointment with their PCP within 7 days of discharge?  Yes   Comments regarding PCP  hospital f/u with Dr. Urrutia on 7/1   Has the patient kept scheduled appointments due by today?  N/A   Has home health visited the patient within 72 hours of discharge?  N/A   Psychosocial issues?  No   Did the patient receive a copy of their discharge instructions?  Yes   Nursing interventions  Reviewed instructions with patient   What is the patient's perception of their health status since discharge?  Improving   Is the patient/caregiver able to teach back the hierarchy of who to call/visit for symptoms/problems? PCP, Specialist, Home health nurse, Urgent Care, ED, 911  Yes   TCM call completed?  Yes   Wrap up additional comments  States he is doing well, needs a prior auth for her new medication, will message case management, confirmed hospital f/u appt with Dr. Urrutia for 7/1.          Radha Perez RN    6/24/2021, 15:33 EDT

## 2021-06-25 ENCOUNTER — HOME CARE VISIT (OUTPATIENT)
Dept: HOME HEALTH SERVICES | Facility: CLINIC | Age: 66
End: 2021-06-25

## 2021-06-25 ENCOUNTER — TELEPHONE (OUTPATIENT)
Dept: INTERNAL MEDICINE | Facility: CLINIC | Age: 66
End: 2021-06-25

## 2021-06-25 VITALS
OXYGEN SATURATION: 95 % | RESPIRATION RATE: 18 BRPM | SYSTOLIC BLOOD PRESSURE: 118 MMHG | HEART RATE: 85 BPM | DIASTOLIC BLOOD PRESSURE: 64 MMHG | TEMPERATURE: 97 F

## 2021-06-25 VITALS
TEMPERATURE: 97.9 F | OXYGEN SATURATION: 96 % | DIASTOLIC BLOOD PRESSURE: 64 MMHG | HEART RATE: 85 BPM | SYSTOLIC BLOOD PRESSURE: 140 MMHG | RESPIRATION RATE: 18 BRPM

## 2021-06-25 PROCEDURE — G0151 HHCP-SERV OF PT,EA 15 MIN: HCPCS

## 2021-06-25 PROCEDURE — G0300 HHS/HOSPICE OF LPN EA 15 MIN: HCPCS

## 2021-06-25 NOTE — TELEPHONE ENCOUNTER
Caller: VALENTINE    Relationship: PHYSICAL THERAPIST    Best call back number: [phone]    Equipment requested: CONSULT WITH [facility]    Reason for the request: SPACER HAS     LACK OF STABILITY IN HER KNEE, KNOW WANTS BRACE.    Prescribing Provider:     Additional information or concerns:     VALENTINE PHYSICAL THERAPIST FOR MS SANCHEZ--PATIENT FELL EARLIER IN THE WEEK--TODAY WAS 1ST TIME PATIENT WAS SEEN BY PHYSICAL THERAPY.     VALENTINE RECOMMENDS CONSULT WITH [facility]--SAMIR.  PHONE # [phone].

## 2021-06-25 NOTE — HOME HEALTH
64 yo female s/p  right hip tendon repair with wound vac in place since sx 1-4-21 . Pt hospitalized 2-15-21 with C - L sx during and subsequent - rehab and SNF  thru 6-14-21.  Fall 6-21-21 with dx concussion and DVT LLE        Phmx : RTKA 2018 failed required  spacer placement - RA   PLOF : household amb -  with rollator   Home : alone prior - sister staying with 24/7 currently    PRECAUTION: per pt up and ambulating as tolerated but no formal TE

## 2021-06-26 VITALS
HEART RATE: 74 BPM | OXYGEN SATURATION: 96 % | SYSTOLIC BLOOD PRESSURE: 126 MMHG | TEMPERATURE: 97.9 F | DIASTOLIC BLOOD PRESSURE: 64 MMHG | RESPIRATION RATE: 18 BRPM

## 2021-06-28 ENCOUNTER — HOME CARE VISIT (OUTPATIENT)
Dept: HOME HEALTH SERVICES | Facility: HOME HEALTHCARE | Age: 66
End: 2021-06-28

## 2021-06-28 VITALS
TEMPERATURE: 97.6 F | OXYGEN SATURATION: 96 % | RESPIRATION RATE: 18 BRPM | SYSTOLIC BLOOD PRESSURE: 126 MMHG | DIASTOLIC BLOOD PRESSURE: 62 MMHG | HEART RATE: 79 BPM

## 2021-06-28 LAB — BACTERIA SPEC ANAEROBE CULT: NORMAL

## 2021-06-28 PROCEDURE — G0300 HHS/HOSPICE OF LPN EA 15 MIN: HCPCS

## 2021-06-29 ENCOUNTER — OFFICE VISIT (OUTPATIENT)
Dept: WOUND CARE | Facility: HOSPITAL | Age: 66
End: 2021-06-29

## 2021-06-29 DIAGNOSIS — M17.12 PRIMARY OSTEOARTHRITIS OF LEFT KNEE: Primary | ICD-10-CM

## 2021-06-29 DIAGNOSIS — I10 ESSENTIAL (PRIMARY) HYPERTENSION: ICD-10-CM

## 2021-06-29 DIAGNOSIS — I25.10 ATHEROSCLEROSIS OF NATIVE CORONARY ARTERY WITHOUT ANGINA PECTORIS, UNSPECIFIED WHETHER NATIVE OR TRANSPLANTED HEART: ICD-10-CM

## 2021-06-29 DIAGNOSIS — S71.001A UNSPECIFIED OPEN WOUND, RIGHT HIP, INITIAL ENCOUNTER: ICD-10-CM

## 2021-06-29 DIAGNOSIS — E78.2 MIXED HYPERLIPIDEMIA: ICD-10-CM

## 2021-06-29 PROCEDURE — 11042 DBRDMT SUBQ TIS 1ST 20SQCM/<: CPT | Performed by: SURGERY

## 2021-06-29 PROCEDURE — 11045 DBRDMT SUBQ TISS EACH ADDL: CPT | Performed by: SURGERY

## 2021-06-29 NOTE — TELEPHONE ENCOUNTER
"History: I am seeing Juancarlos today to discuss his Botox and the procedure what will entail.  He is seen by Natalie at the continuing for physical therapy and or stopping his therapy until he is out of his cast.  He had does not have braces at this time.    Review of Systems   Constitutional: Negative for diaphoresis, fever, malaise/fatigue and weight loss.   HENT: Negative for congestion.    Eyes: Negative for photophobia, discharge and redness.   Respiratory: Negative for cough, wheezing and stridor.    Cardiovascular: Negative for leg swelling.   Gastrointestinal: Negative for constipation, diarrhea, nausea and vomiting.   Genitourinary:        No renal disease or abnormalities   Musculoskeletal: Negative for back pain, joint pain and neck pain.   Skin: Negative for rash.   Neurological: Negative for tremors, sensory change, speech change, focal weakness, seizures, loss of consciousness and weakness.   Endo/Heme/Allergies: Does not bruise/bleed easily.      has no past medical history on file.    Past Surgical History:   Procedure Laterality Date   • NECK EXPLORATION Left 11/14/2018    Procedure: NECK EXPLORATION - FOR OPEN NECK BIOPSY;  Surgeon: Nori Ornelas M.D.;  Location: SURGERY SAME DAY [facility];  Service: Ent     family history is not on file.    Patient has no known allergies.    has a current medication list which includes the following prescription(s): pediatric multiple vit-c-fa, acetaminophen, and fexofenadine hcl.    Pulse 105   Temp 37.3 °C (99.1 °F) (Temporal)   Ht 1.067 m (3' 6\")   Wt 19.3 kg (42 lb 9.6 oz)   SpO2 97%     Physical Exam:     Patient is a healthy-appearing in no acute distress  Weight is appropriate for age and size BMI:  Affect is appropriate for situation   Head: No asymmetry of the jaw or face.    Eyes: extra-ocular movements intact   Nose: No discharge is noted no other abnormalities   Throat: No difficulty swallowing no erythema otherwise normal    Neck: Supple and " Called pt and she wishes to be fitted for a brace.  Called [facility] @ [phone] and will fax order and records to them @ [phone] and they will contact patient to arrange a time for her to come to their office.   non tender   Lungs: non-labored breathing, no retractions   Cardio: cap refill <2sec, equal pulses bilaterally  Skin: Intact, no rashes, no breakdown   Gait is toe toe throughout the cycle  Mild crouch noted  Right / Left lower Extremity    Knee  Popliteal angle -45 bilateral    Ankle  Dorsiflexion knee extended -10 bilateral  Dorsiflexion knee flexed -5 bilateral  Foot  No tenderness about the hindfoot  No Tenderness in the midfoot  No Tenderness in the forefoot  Stable to stressing  No pain with passive motion  Sensation intact to light touch  Cap refill less 2 sec        Assessment: Spastic diplegia with bilateral Achilles and hamstring contractures      Plan: We will go ahead and do Botox to bilateral hamstrings and gastrocsoleus we will then place him in a short leg walking cast for 3 weeks and 3 weeks he will follow-up we will remove his cast and place him in cam walker boots and send him to have AFOs made.  I will have him start back to therapy on December 1.      Kavon Colunga MD  Director Pediatric Orthopedics and Scoliosis

## 2021-06-30 ENCOUNTER — HOME CARE VISIT (OUTPATIENT)
Dept: HOME HEALTH SERVICES | Facility: HOME HEALTHCARE | Age: 66
End: 2021-06-30

## 2021-06-30 VITALS
HEART RATE: 89 BPM | OXYGEN SATURATION: 93 % | TEMPERATURE: 97.8 F | SYSTOLIC BLOOD PRESSURE: 122 MMHG | DIASTOLIC BLOOD PRESSURE: 60 MMHG | RESPIRATION RATE: 18 BRPM

## 2021-06-30 PROCEDURE — G0300 HHS/HOSPICE OF LPN EA 15 MIN: HCPCS

## 2021-07-01 ENCOUNTER — OFFICE VISIT (OUTPATIENT)
Dept: INTERNAL MEDICINE | Facility: CLINIC | Age: 66
End: 2021-07-01

## 2021-07-01 ENCOUNTER — TELEPHONE (OUTPATIENT)
Dept: INTERNAL MEDICINE | Facility: CLINIC | Age: 66
End: 2021-07-01

## 2021-07-01 ENCOUNTER — READMISSION MANAGEMENT (OUTPATIENT)
Dept: CALL CENTER | Facility: HOSPITAL | Age: 66
End: 2021-07-01

## 2021-07-01 VITALS
WEIGHT: 190 LBS | BODY MASS INDEX: 30.53 KG/M2 | HEART RATE: 62 BPM | OXYGEN SATURATION: 100 % | TEMPERATURE: 96.8 F | SYSTOLIC BLOOD PRESSURE: 136 MMHG | DIASTOLIC BLOOD PRESSURE: 68 MMHG | HEIGHT: 66 IN

## 2021-07-01 DIAGNOSIS — I82.412 ACUTE DEEP VEIN THROMBOSIS (DVT) OF LEFT FEMORAL VEIN (HCC): Primary | ICD-10-CM

## 2021-07-01 DIAGNOSIS — I10 ESSENTIAL HYPERTENSION: ICD-10-CM

## 2021-07-01 PROBLEM — S92.309A: Status: ACTIVE | Noted: 2019-05-10

## 2021-07-01 PROCEDURE — 99214 OFFICE O/P EST MOD 30 MIN: CPT | Performed by: INTERNAL MEDICINE

## 2021-07-01 PROCEDURE — 1111F DSCHRG MED/CURRENT MED MERGE: CPT | Performed by: INTERNAL MEDICINE

## 2021-07-01 RX ORDER — SACCHAROMYCES BOULARDII 250 MG
250 CAPSULE ORAL DAILY
Qty: 90 CAPSULE | Refills: 3 | Status: SHIPPED | OUTPATIENT
Start: 2021-07-01 | End: 2023-01-11

## 2021-07-01 RX ORDER — CARVEDILOL 25 MG/1
25 TABLET ORAL 2 TIMES DAILY WITH MEALS
Qty: 180 TABLET | Refills: 3 | Status: SHIPPED | OUTPATIENT
Start: 2021-07-01 | End: 2023-02-03 | Stop reason: SDUPTHER

## 2021-07-01 NOTE — OUTREACH NOTE
Medical Week 2 Survey      Responses   [facility] patient discharged from?  [place]   Does the patient have one of the following disease processes/diagnoses(primary or secondary)?  Other   Week 2 attempt successful?  No   Unsuccessful attempts  Attempt 1          Tova John RN

## 2021-07-01 NOTE — PROGRESS NOTES
Transitional Care Follow Up Visit  Subjective Patient is here for transition to care after recent hospitalization from a fall where she had a concussion.  She also was noted to have a left leg deep vein thrombosis.    Tawny Shin is a 65 y.o. female who presents for a transitional care management visit.    Within 48 business hours after discharge our office contacted her via telephone to coordinate her care and needs.      I reviewed and discussed the details of that call along with the discharge summary, hospital problems, inpatient lab results, inpatient diagnostic studies, and consultation reports with Tawny.     Current outpatient and discharge medications have been reconciled for the patient.  Reviewed by: Davon Urrutia MD      Date of TCM Phone Call 6/23/2021   [facility]   Date of Admission 6/22/2021   Date of Discharge 6/23/2021   Discharge Disposition Home or Self Care     Risk for Readmission (LACE) Score: 7 (6/23/2021  5:01 AM)      History of Present Illness   Course During Hospital Stay: During her hospitalization she had head imaging which showed no acute bleeds she did have a left deep vein thrombosis during that stay she is fully anticoagulated at this point     The following portions of the patient's history were reviewed and updated as appropriate: allergies, current medications, past family history, past medical history, past social history, past surgical history and problem list.    Review of Systems   Constitutional: Negative for activity change, appetite change and chills.   HENT: Negative for congestion, ear pain and facial swelling.    Eyes: Negative for pain, discharge and itching.   Respiratory: Negative for apnea, cough and shortness of breath.    Cardiovascular: Negative for chest pain, palpitations and leg swelling.   Gastrointestinal: Negative for abdominal distention, abdominal pain and anal bleeding.   Endocrine: Negative for cold intolerance and heat  intolerance.   Genitourinary: Negative for difficulty urinating, dysuria and flank pain.   Musculoskeletal: Positive for arthralgias, back pain and neck pain. Negative for joint swelling.   Skin: Negative for color change, pallor and rash.   Allergic/Immunologic: Negative for environmental allergies and food allergies.   Neurological: Negative for dizziness and facial asymmetry.   Hematological: Negative for adenopathy. Does not bruise/bleed easily.   Psychiatric/Behavioral: Negative for agitation, behavioral problems and confusion.       Objective   Physical Exam  Constitutional:       Appearance: She is well-developed.   HENT:      Head: Normocephalic and atraumatic.   Eyes:      Pupils: Pupils are equal, round, and reactive to light.   Cardiovascular:      Rate and Rhythm: Normal rate and regular rhythm.   Pulmonary:      Effort: Pulmonary effort is normal.      Breath sounds: Normal breath sounds.   Abdominal:      General: Bowel sounds are normal.      Palpations: Abdomen is soft.   Musculoskeletal:         General: Swelling present. Normal range of motion.      Cervical back: Normal range of motion and neck supple.   Skin:     General: Skin is warm and dry.      Comments: Patient has a wound VAC right hip   Neurological:      Mental Status: She is alert and oriented to person, place, and time.   Psychiatric:         Behavior: Behavior normal.         Assessment/Plan   Diagnoses and all orders for this visit:    1. Acute deep vein thrombosis (DVT) of left femoral vein (CMS/HCC) (Primary)    2. Essential hypertension  -     carvedilol (COREG) 25 MG tablet; Take 1 tablet by mouth 2 (Two) Times a Day With Meals.  Dispense: 180 tablet; Refill: 3    Other orders  -     saccharomyces boulardii (Florastor) 250 MG capsule; Take 1 capsule by mouth Daily.  Dispense: 90 capsule; Refill: 3  -     apixaban (ELIQUIS) 5 MG tablet tablet; 1 twice daily  Indications: DVT/PE (active thrombosis)  Dispense: 60 tablet; Refill:  2      During today's visit we reviewed her hospitalization her diagnosis of DVT this is her second DVT by history which will likely recur salt and lifelong anticoagulation therapy.  Complicating feature is is that patient is prone to falling that being the case that puts her at increased risk of complications.  We discussed some of this during our visit today.  I will plan on seeing her back in 3 months at which point we will determine whether she is safe to continue anticoagulation or whether other options need to be entertained.

## 2021-07-01 NOTE — PROGRESS NOTES
Subjective   Tawny Shin is a 65 y.o. female.   Chief Complaint   Patient presents with   • Transitional Care Management   • Hospital Follow Up Visit     Uday D/C:6/23/2021, Blood Clot Left Leg, Concussion from Fall       History of Present Illness     The following portions of the patient's history were reviewed and updated as appropriate: allergies, current medications, past family history, past medical history, past social history, past surgical history and problem list.    Review of Systems    Objective   Past Medical History:   Diagnosis Date   • Arthritis    • Asthma    • Chronic sinusitis    • Coronary artery disease    • Eustachian tube dysfunction    • Heart disease    • Herpes simplex    • Hyperlipidemia    • Hypertension    • Knee dislocation    • Labral tear of right hip joint    • Laryngitis sicca    • Laryngitis, chronic    • MI (myocardial infarction) (CMS/HCC)    • Otorrhea    • Sensorineural hearing loss    • Sjogren's disease (CMS/HCC)       Past Surgical History:   Procedure Laterality Date   • A-V CARDIAC PACEMAKER INSERTION  2016   • ATRIAL CARDIAC PACEMAKER INSERTION     • CARDIAC CATHETERIZATION     • CATARACT EXTRACTION     • CERVICAL CORPECTOMY N/A 3/3/2021    Procedure: CERVICAL 6 CORPECTOMY WITH TITANIUM CAGE WITH NEURO MONITORING;  Surgeon: Bandar Shea MD;  Location:  [facility] OR;  Service: Neurosurgery;  Laterality: N/A;   • COLONOSCOPY  11/08/2011    One fold in the ascending colon which showed ulcer otherwise normal exam   • COLONOSCOPY  11/12/2004    Normal exam repeat in five years   • CORONARY ANGIOPLASTY WITH STENT PLACEMENT      X 2; 2013 & 2014   • ENDOSCOPY  07/10/2014    Normal exam   • HIP ABDUCTION TENOTOMY BILATERAL Right 1/14/2021    Procedure: RIGHT HIP GLUTEUS MEDLUS / MINIMUS REPAIR, POSSIBLE ACHILLES ALLOGRAFT;  Surgeon: Nino Carlson MD;  Location:  [facility] OR;  Service: Orthopedics;  Laterality: Right;   • INCISION AND DRAINAGE HIP Right 2/9/2021     Procedure: HIP INCISION AND DRAINAGE;  Surgeon: Nino Carlson MD;  Location: [facility] OR;  Service: Orthopedics;  Laterality: Right;   • JOINT REPLACEMENT     • LUMBAR DISCECTOMY Right 3/23/2021    Procedure: LUMBAR DISCECTOMY MICRO, Lumbar 1/2 right;  Surgeon: Bandar Shea MD;  Location: [facility] OR;  Service: Neurosurgery;  Laterality: Right;   • LUMBAR FUSION N/A 1/19/2018    Procedure: L3-4,L4-5 DECOMPRESSION, POSTERIOR SPINAL FUSION WITH INSTRUMENTATION;  Surgeon: Fortino Oropeza MD;  Location:  [facility] OR;  Service:    • LUMBAR LAMINECTOMY WITH FUSION Left 1/17/2018    Procedure: LEFT L3-4 L4-5 LATERAL LUMBAR INTERBODY FUSION;  Surgeon: Fortino Oropeza MD;  Location: [facility] OR;  Service:    • MYRINGOTOMY W/ TUBES  09/04/2014    TUBES NO LONGER IN PLACE   • OTHER SURGICAL HISTORY      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 so hardware was removed and spacers were placed   • REPLACEMENT TOTAL KNEE Right         Current Outpatient Medications:   •  apixaban (ELIQUIS) 5 MG tablet tablet, Take 2 tablets by mouth Every 12 (Twelve) Hours for 14 doses. Indications: DVT/PE (active thrombosis), Disp: 28 tablet, Rfl: 0  •  budesonide (PULMICORT) 0.5 MG/2ML nebulizer solution, Take 2 mL by nebulization 2 (Two) Times a Day As Needed (shortness of air)., Disp:  , Rfl:   •  carvedilol (COREG) 25 MG tablet, Take 1 tablet by mouth 2 (Two) Times a Day With Meals., Disp: 180 tablet, Rfl: 3  •  cyclobenzaprine (FLEXERIL) 10 MG tablet, Take 1 tablet by mouth 3 (Three) Times a Day As Needed for Muscle Spasms., Disp:  , Rfl:   •  fluticasone (FLONASE) 50 MCG/ACT nasal spray, 1 spray into the nostril(s) as directed by provider Daily., Disp: , Rfl:   •  furosemide (LASIX) 40 MG tablet, Take 40 mg by mouth Daily., Disp: , Rfl:   •  gabapentin (NEURONTIN) 300 MG capsule, Take 300 mg by mouth Every Night., Disp: , Rfl:   •  guaiFENesin (MUCINEX) 600 MG 12 hr tablet, Take 1 tablet by mouth Every 12 (Twelve) Hours., Disp: , Rfl:   •   isosorbide mononitrate (IMDUR) 60 MG 24 hr tablet, Take 1 tablet by mouth 2 (Two) Times a Day., Disp: , Rfl:   •  levothyroxine (SYNTHROID, LEVOTHROID) 75 MCG tablet, Take 1 tablet by mouth Daily., Disp: 90 tablet, Rfl: 3  •  lidocaine (LIDODERM) 5 %, Place 1 patch on the skin as directed by provider Daily. Remove & Discard patch within 12 hours or as directed by MD, Disp: , Rfl:   •  magnesium hydroxide (MILK OF MAGNESIA) 2400 MG/10ML suspension suspension, Take 10 mL by mouth Daily As Needed., Disp: , Rfl:   •  magnesium oxide (MAG-OX) 400 MG tablet, Take 400 mg by mouth., Disp: , Rfl:   •  multivitamin with minerals tablet tablet, Take 1 tablet by mouth Daily., Disp:  , Rfl:   •  Naloxegol Oxalate (Movantik) 25 MG tablet, Take 1 tablet by mouth Every Morning., Disp: 90 tablet, Rfl: 3  •  naloxone (NARCAN) 0.4 MG/ML injection, Infuse 0.25 mL into a venous catheter Every 5 (Five) Minutes As Needed for Opioid Reversal or Respiratory Depression (see administration instructions)., Disp:  , Rfl:   •  nitroglycerin (Nitrostat) 0.4 MG SL tablet, Place 1 tablet under the tongue Every 5 (Five) Minutes As Needed for Chest Pain. Take no more than 3 doses in 15 minutes., Disp: , Rfl: 12  •  ondansetron (ZOFRAN) 4 MG tablet, Take 1 tablet by mouth Every 6 (Six) Hours As Needed for Nausea or Vomiting., Disp:  , Rfl:   •  [START ON 7/21/2021] oxyCODONE (ROXICODONE) 15 MG immediate release tablet, Take 1 tablet by mouth Every 12 (Twelve) Hours. (Patient taking differently: Take 15 mg by mouth 4 (Four) Times a Day.), Disp: 60 tablet, Rfl: 0  •  pantoprazole (PROTONIX) 40 MG EC tablet, Take 1 tablet by mouth Daily., Disp: 90 tablet, Rfl: 3  •  potassium chloride (K-DUR,KLOR-CON) 20 MEQ CR tablet, Take 20 mEq by mouth Daily., Disp: , Rfl:   •  promethazine (PHENERGAN) 25 MG tablet, Take 25 mg by mouth Every 6 (Six) Hours As Needed for Nausea or Vomiting., Disp: , Rfl:   •  saccharomyces boulardii (Florastor) 250 MG capsule, Take 1  capsule by mouth Daily., Disp: 90 capsule, Rfl: 3  •  salsalate (DISALCID) 500 MG tablet, Take 1 tablet by mouth 4 (Four) Times a Day., Disp: 120 tablet, Rfl: 11  •  sucralfate (CARAFATE) 1 g tablet, Take 1 g by mouth 4 (Four) Times a Day Before Meals & at Bedtime., Disp: , Rfl:   •  traMADol (ULTRAM) 50 MG tablet, Take 50 mg by mouth 3 (Three) Times a Day., Disp: , Rfl:   •  valACYclovir (VALTREX) 500 MG tablet, Take 1 tablet by mouth Daily. Indications: chronic suppressive therapy, Disp: 90 tablet, Rfl: 3  •  docusate sodium 100 MG capsule, Take 1 capsule by mouth 2 (Two) Times a Day. (Patient taking differently: Take 100 mg by mouth Daily.), Disp:  , Rfl:      Vitals:    07/01/21 1517   BP: 136/68   Pulse: 62   Temp: 96.8 °F (36 °C)   SpO2: 100%         07/01/21  1517   Weight: 86.2 kg (190 lb)         Physical Exam          Assessment/Plan   Diagnoses and all orders for this visit:    1. Essential hypertension (Primary)  -     carvedilol (COREG) 25 MG tablet; Take 1 tablet by mouth 2 (Two) Times a Day With Meals.  Dispense: 180 tablet; Refill: 3    Other orders  -     saccharomyces boulardii (Florastor) 250 MG capsule; Take 1 capsule by mouth Daily.  Dispense: 90 capsule; Refill: 3

## 2021-07-02 ENCOUNTER — READMISSION MANAGEMENT (OUTPATIENT)
Dept: CALL CENTER | Facility: HOSPITAL | Age: 66
End: 2021-07-02

## 2021-07-02 ENCOUNTER — HOME CARE VISIT (OUTPATIENT)
Dept: HOME HEALTH SERVICES | Facility: CLINIC | Age: 66
End: 2021-07-02

## 2021-07-02 VITALS
HEART RATE: 70 BPM | TEMPERATURE: 98.2 F | DIASTOLIC BLOOD PRESSURE: 64 MMHG | RESPIRATION RATE: 18 BRPM | SYSTOLIC BLOOD PRESSURE: 122 MMHG | OXYGEN SATURATION: 96 %

## 2021-07-02 PROCEDURE — G0300 HHS/HOSPICE OF LPN EA 15 MIN: HCPCS

## 2021-07-02 NOTE — OUTREACH NOTE
Medical Week 2 Survey      Responses   [facility] patient discharged from?  [place]   Does the patient have one of the following disease processes/diagnoses(primary or secondary)?  Other   Week 2 attempt successful?  No   Unsuccessful attempts  Attempt 2          Juanis Garcia LPN

## 2021-07-05 ENCOUNTER — HOME CARE VISIT (OUTPATIENT)
Dept: HOME HEALTH SERVICES | Facility: HOME HEALTHCARE | Age: 66
End: 2021-07-05

## 2021-07-05 PROCEDURE — G0300 HHS/HOSPICE OF LPN EA 15 MIN: HCPCS

## 2021-07-06 ENCOUNTER — APPOINTMENT (OUTPATIENT)
Dept: CT IMAGING | Facility: HOSPITAL | Age: 66
End: 2021-07-06

## 2021-07-06 ENCOUNTER — HOSPITAL ENCOUNTER (INPATIENT)
Facility: HOSPITAL | Age: 66
LOS: 7 days | Discharge: NURSING, REHAB OR LONG-TERM CARE | End: 2021-07-13
Attending: STUDENT IN AN ORGANIZED HEALTH CARE EDUCATION/TRAINING PROGRAM | Admitting: INTERNAL MEDICINE

## 2021-07-06 ENCOUNTER — APPOINTMENT (OUTPATIENT)
Dept: GENERAL RADIOLOGY | Facility: HOSPITAL | Age: 66
End: 2021-07-06

## 2021-07-06 ENCOUNTER — OFFICE VISIT (OUTPATIENT)
Dept: WOUND CARE | Facility: HOSPITAL | Age: 66
End: 2021-07-06

## 2021-07-06 VITALS
DIASTOLIC BLOOD PRESSURE: 60 MMHG | OXYGEN SATURATION: 94 % | RESPIRATION RATE: 18 BRPM | TEMPERATURE: 98.1 F | SYSTOLIC BLOOD PRESSURE: 118 MMHG | HEART RATE: 82 BPM

## 2021-07-06 DIAGNOSIS — I10 ESSENTIAL (PRIMARY) HYPERTENSION: ICD-10-CM

## 2021-07-06 DIAGNOSIS — M86.9 OSTEOMYELITIS (HCC): ICD-10-CM

## 2021-07-06 DIAGNOSIS — S71.001A UNSPECIFIED OPEN WOUND, RIGHT HIP, INITIAL ENCOUNTER: ICD-10-CM

## 2021-07-06 DIAGNOSIS — E78.2 MIXED HYPERLIPIDEMIA: ICD-10-CM

## 2021-07-06 DIAGNOSIS — R41.82 ALTERED MENTAL STATUS, UNSPECIFIED ALTERED MENTAL STATUS TYPE: ICD-10-CM

## 2021-07-06 DIAGNOSIS — Z78.9 DECREASED ACTIVITIES OF DAILY LIVING (ADL): ICD-10-CM

## 2021-07-06 DIAGNOSIS — R68.89 DECREASED FUNCTIONAL ACTIVITY TOLERANCE: ICD-10-CM

## 2021-07-06 DIAGNOSIS — T81.89XD NON-HEALING SURGICAL WOUND, SUBSEQUENT ENCOUNTER: ICD-10-CM

## 2021-07-06 DIAGNOSIS — I25.10 ATHEROSCLEROSIS OF NATIVE CORONARY ARTERY WITHOUT ANGINA PECTORIS, UNSPECIFIED WHETHER NATIVE OR TRANSPLANTED HEART: ICD-10-CM

## 2021-07-06 DIAGNOSIS — M86.9 OSTEOMYELITIS OF RIGHT FEMUR, UNSPECIFIED TYPE (HCC): Primary | ICD-10-CM

## 2021-07-06 PROBLEM — I82.401 ACUTE DEEP VEIN THROMBOSIS (DVT) OF RIGHT LOWER EXTREMITY: Status: ACTIVE | Noted: 2021-06-23

## 2021-07-06 PROBLEM — R40.4 TRANSIENT ALTERATION OF AWARENESS: Status: ACTIVE | Noted: 2021-07-06

## 2021-07-06 PROBLEM — T81.89XA NON-HEALING SURGICAL WOUND: Status: ACTIVE | Noted: 2021-07-06

## 2021-07-06 PROBLEM — I82.501 CHRONIC DEEP VEIN THROMBOSIS (DVT) OF RIGHT LOWER EXTREMITY (HCC): Status: ACTIVE | Noted: 2021-06-23

## 2021-07-06 LAB
ANION GAP SERPL CALCULATED.3IONS-SCNC: 10 MMOL/L (ref 5–15)
APTT PPP: 47.8 SECONDS (ref 24.1–35)
ATMOSPHERIC PRESS: 750 MMHG
BACTERIA UR QL AUTO: ABNORMAL /HPF
BASE EXCESS BLDV CALC-SCNC: 6.1 MMOL/L (ref 0–2)
BASOPHILS # BLD AUTO: 0.04 10*3/MM3 (ref 0–0.2)
BASOPHILS NFR BLD AUTO: 0.8 % (ref 0–1.5)
BDY SITE: ABNORMAL
BILIRUB UR QL STRIP: NEGATIVE
BODY TEMPERATURE: 37 C
BUN SERPL-MCNC: 11 MG/DL (ref 8–23)
BUN/CREAT SERPL: 12.2 (ref 7–25)
CALCIUM SPEC-SCNC: 9.2 MG/DL (ref 8.6–10.5)
CHLORIDE SERPL-SCNC: 100 MMOL/L (ref 98–107)
CK SERPL-CCNC: 35 U/L (ref 20–180)
CLARITY UR: CLEAR
CO2 SERPL-SCNC: 29 MMOL/L (ref 22–29)
COLOR UR: YELLOW
CREAT SERPL-MCNC: 0.9 MG/DL (ref 0.57–1)
CRP SERPL-MCNC: 12.16 MG/DL (ref 0–0.5)
D-LACTATE SERPL-SCNC: 1.8 MMOL/L (ref 0.5–2)
DEPRECATED RDW RBC AUTO: 51.9 FL (ref 37–54)
EOSINOPHIL # BLD AUTO: 0.43 10*3/MM3 (ref 0–0.4)
EOSINOPHIL NFR BLD AUTO: 8.3 % (ref 0.3–6.2)
ERYTHROCYTE [DISTWIDTH] IN BLOOD BY AUTOMATED COUNT: 15.4 % (ref 12.3–15.4)
ERYTHROCYTE [SEDIMENTATION RATE] IN BLOOD: 101 MM/HR (ref 0–20)
GFR SERPL CREATININE-BSD FRML MDRD: 62 ML/MIN/1.73
GLUCOSE SERPL-MCNC: 136 MG/DL (ref 65–99)
GLUCOSE UR STRIP-MCNC: NEGATIVE MG/DL
HCO3 BLDV-SCNC: 32.8 MMOL/L (ref 22–28)
HCT VFR BLD AUTO: 31.5 % (ref 34–46.6)
HGB BLD-MCNC: 9.7 G/DL (ref 12–15.9)
HGB UR QL STRIP.AUTO: NEGATIVE
HYALINE CASTS UR QL AUTO: ABNORMAL /LPF
IMM GRANULOCYTES # BLD AUTO: 0.01 10*3/MM3 (ref 0–0.05)
IMM GRANULOCYTES NFR BLD AUTO: 0.2 % (ref 0–0.5)
INR PPP: 1.55 (ref 0.91–1.09)
KETONES UR QL STRIP: NEGATIVE
LEUKOCYTE ESTERASE UR QL STRIP.AUTO: ABNORMAL
LYMPHOCYTES # BLD AUTO: 1.22 10*3/MM3 (ref 0.7–3.1)
LYMPHOCYTES NFR BLD AUTO: 23.6 % (ref 19.6–45.3)
Lab: ABNORMAL
MCH RBC QN AUTO: 28.3 PG (ref 26.6–33)
MCHC RBC AUTO-ENTMCNC: 30.8 G/DL (ref 31.5–35.7)
MCV RBC AUTO: 91.8 FL (ref 79–97)
MODALITY: ABNORMAL
MONOCYTES # BLD AUTO: 0.75 10*3/MM3 (ref 0.1–0.9)
MONOCYTES NFR BLD AUTO: 14.5 % (ref 5–12)
NEUTROPHILS NFR BLD AUTO: 2.71 10*3/MM3 (ref 1.7–7)
NEUTROPHILS NFR BLD AUTO: 52.6 % (ref 42.7–76)
NITRITE UR QL STRIP: NEGATIVE
NRBC BLD AUTO-RTO: 0 /100 WBC (ref 0–0.2)
PCO2 BLDV: 55.1 MM HG (ref 41–51)
PH BLDV: 7.38 PH UNITS (ref 7.32–7.42)
PH UR STRIP.AUTO: <=5 [PH] (ref 5–8)
PLATELET # BLD AUTO: 297 10*3/MM3 (ref 140–450)
PMV BLD AUTO: 9.4 FL (ref 6–12)
PO2 BLDV: 29.3 MM HG (ref 27–53)
POTASSIUM SERPL-SCNC: 3.6 MMOL/L (ref 3.5–5.2)
PROCALCITONIN SERPL-MCNC: 0.07 NG/ML (ref 0–0.25)
PROT UR QL STRIP: NEGATIVE
PROTHROMBIN TIME: 17.4 SECONDS (ref 11.5–13.4)
RBC # BLD AUTO: 3.43 10*6/MM3 (ref 3.77–5.28)
RBC # UR: ABNORMAL /HPF
REF LAB TEST METHOD: ABNORMAL
SAO2 % BLDCOV: 44.5 % (ref 45–75)
SARS-COV-2 RNA PNL SPEC NAA+PROBE: NOT DETECTED
SODIUM SERPL-SCNC: 139 MMOL/L (ref 136–145)
SP GR UR STRIP: 1.01 (ref 1–1.03)
SQUAMOUS #/AREA URNS HPF: ABNORMAL /HPF
UROBILINOGEN UR QL STRIP: ABNORMAL
VENTILATOR MODE: ABNORMAL
WBC # BLD AUTO: 5.16 10*3/MM3 (ref 3.4–10.8)
WBC UR QL AUTO: ABNORMAL /HPF

## 2021-07-06 PROCEDURE — 25010000002 VANCOMYCIN: Performed by: EMERGENCY MEDICINE

## 2021-07-06 PROCEDURE — 81001 URINALYSIS AUTO W/SCOPE: CPT | Performed by: EMERGENCY MEDICINE

## 2021-07-06 PROCEDURE — 86140 C-REACTIVE PROTEIN: CPT | Performed by: EMERGENCY MEDICINE

## 2021-07-06 PROCEDURE — 11045 DBRDMT SUBQ TISS EACH ADDL: CPT | Performed by: SURGERY

## 2021-07-06 PROCEDURE — 11042 DBRDMT SUBQ TIS 1ST 20SQCM/<: CPT | Performed by: SURGERY

## 2021-07-06 PROCEDURE — 36415 COLL VENOUS BLD VENIPUNCTURE: CPT

## 2021-07-06 PROCEDURE — 99285 EMERGENCY DEPT VISIT HI MDM: CPT

## 2021-07-06 PROCEDURE — 82550 ASSAY OF CK (CPK): CPT | Performed by: EMERGENCY MEDICINE

## 2021-07-06 PROCEDURE — 82803 BLOOD GASES ANY COMBINATION: CPT

## 2021-07-06 PROCEDURE — 85610 PROTHROMBIN TIME: CPT | Performed by: EMERGENCY MEDICINE

## 2021-07-06 PROCEDURE — 84145 PROCALCITONIN (PCT): CPT | Performed by: EMERGENCY MEDICINE

## 2021-07-06 PROCEDURE — 87086 URINE CULTURE/COLONY COUNT: CPT | Performed by: EMERGENCY MEDICINE

## 2021-07-06 PROCEDURE — 71045 X-RAY EXAM CHEST 1 VIEW: CPT

## 2021-07-06 PROCEDURE — 87635 SARS-COV-2 COVID-19 AMP PRB: CPT | Performed by: EMERGENCY MEDICINE

## 2021-07-06 PROCEDURE — 83605 ASSAY OF LACTIC ACID: CPT | Performed by: EMERGENCY MEDICINE

## 2021-07-06 PROCEDURE — 85651 RBC SED RATE NONAUTOMATED: CPT | Performed by: EMERGENCY MEDICINE

## 2021-07-06 PROCEDURE — 94799 UNLISTED PULMONARY SVC/PX: CPT

## 2021-07-06 PROCEDURE — 70450 CT HEAD/BRAIN W/O DYE: CPT

## 2021-07-06 PROCEDURE — 80048 BASIC METABOLIC PNL TOTAL CA: CPT | Performed by: EMERGENCY MEDICINE

## 2021-07-06 PROCEDURE — 85730 THROMBOPLASTIN TIME PARTIAL: CPT | Performed by: EMERGENCY MEDICINE

## 2021-07-06 PROCEDURE — 72170 X-RAY EXAM OF PELVIS: CPT

## 2021-07-06 PROCEDURE — 87040 BLOOD CULTURE FOR BACTERIA: CPT | Performed by: EMERGENCY MEDICINE

## 2021-07-06 PROCEDURE — 85025 COMPLETE CBC W/AUTO DIFF WBC: CPT | Performed by: EMERGENCY MEDICINE

## 2021-07-06 RX ORDER — LEVOTHYROXINE SODIUM 0.07 MG/1
75 TABLET ORAL
Status: DISCONTINUED | OUTPATIENT
Start: 2021-07-07 | End: 2021-07-13 | Stop reason: HOSPADM

## 2021-07-06 RX ORDER — PANTOPRAZOLE SODIUM 40 MG/1
40 TABLET, DELAYED RELEASE ORAL DAILY
Status: DISCONTINUED | OUTPATIENT
Start: 2021-07-07 | End: 2021-07-10

## 2021-07-06 RX ORDER — POTASSIUM CHLORIDE 750 MG/1
20 CAPSULE, EXTENDED RELEASE ORAL DAILY
Status: DISCONTINUED | OUTPATIENT
Start: 2021-07-07 | End: 2021-07-13 | Stop reason: HOSPADM

## 2021-07-06 RX ORDER — NAPROXEN 375 MG/1
375 TABLET ORAL 2 TIMES DAILY WITH MEALS
Status: DISCONTINUED | OUTPATIENT
Start: 2021-07-07 | End: 2021-07-10

## 2021-07-06 RX ORDER — FLUTICASONE PROPIONATE 50 MCG
1 SPRAY, SUSPENSION (ML) NASAL DAILY
Status: DISCONTINUED | OUTPATIENT
Start: 2021-07-07 | End: 2021-07-13 | Stop reason: HOSPADM

## 2021-07-06 RX ORDER — ONDANSETRON 4 MG/1
4 TABLET, FILM COATED ORAL EVERY 6 HOURS PRN
Status: DISCONTINUED | OUTPATIENT
Start: 2021-07-06 | End: 2021-07-08 | Stop reason: SDUPTHER

## 2021-07-06 RX ORDER — SODIUM CHLORIDE 0.9 % (FLUSH) 0.9 %
10 SYRINGE (ML) INJECTION AS NEEDED
Status: DISCONTINUED | OUTPATIENT
Start: 2021-07-06 | End: 2021-07-13 | Stop reason: HOSPADM

## 2021-07-06 RX ORDER — FUROSEMIDE 40 MG/1
40 TABLET ORAL DAILY
Status: DISCONTINUED | OUTPATIENT
Start: 2021-07-07 | End: 2021-07-10

## 2021-07-06 RX ORDER — ACETAMINOPHEN 650 MG/1
650 SUPPOSITORY RECTAL EVERY 4 HOURS PRN
Status: DISCONTINUED | OUTPATIENT
Start: 2021-07-06 | End: 2021-07-08 | Stop reason: SDUPTHER

## 2021-07-06 RX ORDER — SUCRALFATE 1 G/1
1 TABLET ORAL
Status: DISCONTINUED | OUTPATIENT
Start: 2021-07-07 | End: 2021-07-13 | Stop reason: HOSPADM

## 2021-07-06 RX ORDER — LIDOCAINE 50 MG/G
1 PATCH TOPICAL NIGHTLY
Status: DISCONTINUED | OUTPATIENT
Start: 2021-07-06 | End: 2021-07-13 | Stop reason: HOSPADM

## 2021-07-06 RX ORDER — MULTIPLE VITAMINS W/ MINERALS TAB 9MG-400MCG
1 TAB ORAL DAILY
Status: DISCONTINUED | OUTPATIENT
Start: 2021-07-07 | End: 2021-07-13 | Stop reason: HOSPADM

## 2021-07-06 RX ORDER — ONDANSETRON 2 MG/ML
4 INJECTION INTRAMUSCULAR; INTRAVENOUS EVERY 6 HOURS PRN
Status: DISCONTINUED | OUTPATIENT
Start: 2021-07-06 | End: 2021-07-08 | Stop reason: SDUPTHER

## 2021-07-06 RX ORDER — ISOSORBIDE MONONITRATE 60 MG/1
60 TABLET, EXTENDED RELEASE ORAL 2 TIMES DAILY
Status: DISCONTINUED | OUTPATIENT
Start: 2021-07-07 | End: 2021-07-13 | Stop reason: HOSPADM

## 2021-07-06 RX ORDER — ACETAMINOPHEN 160 MG/5ML
650 SOLUTION ORAL EVERY 4 HOURS PRN
Status: DISCONTINUED | OUTPATIENT
Start: 2021-07-06 | End: 2021-07-08 | Stop reason: SDUPTHER

## 2021-07-06 RX ORDER — SODIUM CHLORIDE 0.9 % (FLUSH) 0.9 %
10 SYRINGE (ML) INJECTION EVERY 12 HOURS SCHEDULED
Status: DISCONTINUED | OUTPATIENT
Start: 2021-07-07 | End: 2021-07-13 | Stop reason: HOSPADM

## 2021-07-06 RX ORDER — NITROGLYCERIN 0.4 MG/1
0.4 TABLET SUBLINGUAL
Status: DISCONTINUED | OUTPATIENT
Start: 2021-07-06 | End: 2021-07-13 | Stop reason: HOSPADM

## 2021-07-06 RX ORDER — CARVEDILOL 25 MG/1
25 TABLET ORAL 2 TIMES DAILY WITH MEALS
Status: DISCONTINUED | OUTPATIENT
Start: 2021-07-07 | End: 2021-07-13 | Stop reason: HOSPADM

## 2021-07-06 RX ORDER — SACCHAROMYCES BOULARDII 250 MG
250 CAPSULE ORAL DAILY
Status: DISCONTINUED | OUTPATIENT
Start: 2021-07-07 | End: 2021-07-13 | Stop reason: HOSPADM

## 2021-07-06 RX ORDER — ACETAMINOPHEN 325 MG/1
650 TABLET ORAL EVERY 4 HOURS PRN
Status: DISCONTINUED | OUTPATIENT
Start: 2021-07-06 | End: 2021-07-08 | Stop reason: SDUPTHER

## 2021-07-06 RX ORDER — GABAPENTIN 300 MG/1
300 CAPSULE ORAL NIGHTLY
Status: DISCONTINUED | OUTPATIENT
Start: 2021-07-07 | End: 2021-07-13 | Stop reason: HOSPADM

## 2021-07-06 RX ORDER — TRAMADOL HYDROCHLORIDE 50 MG/1
50 TABLET ORAL 3 TIMES DAILY
Status: DISCONTINUED | OUTPATIENT
Start: 2021-07-07 | End: 2021-07-13 | Stop reason: HOSPADM

## 2021-07-06 RX ORDER — VALACYCLOVIR HYDROCHLORIDE 500 MG/1
500 TABLET, FILM COATED ORAL
Status: DISCONTINUED | OUTPATIENT
Start: 2021-07-07 | End: 2021-07-13 | Stop reason: HOSPADM

## 2021-07-06 RX ADMIN — VANCOMYCIN HYDROCHLORIDE 1750 MG: 1 INJECTION, POWDER, LYOPHILIZED, FOR SOLUTION INTRAVENOUS at 22:20

## 2021-07-06 RX ADMIN — SODIUM CHLORIDE 2 G: 900 INJECTION INTRAVENOUS at 21:17

## 2021-07-07 ENCOUNTER — READMISSION MANAGEMENT (OUTPATIENT)
Dept: CALL CENTER | Facility: HOSPITAL | Age: 66
End: 2021-07-07

## 2021-07-07 ENCOUNTER — APPOINTMENT (OUTPATIENT)
Dept: MRI IMAGING | Facility: HOSPITAL | Age: 66
End: 2021-07-07

## 2021-07-07 ENCOUNTER — HOME CARE VISIT (OUTPATIENT)
Dept: HOME HEALTH SERVICES | Facility: HOME HEALTHCARE | Age: 66
End: 2021-07-07

## 2021-07-07 ENCOUNTER — HOME CARE VISIT (OUTPATIENT)
Dept: HOME HEALTH SERVICES | Facility: CLINIC | Age: 66
End: 2021-07-07

## 2021-07-07 LAB
ALBUMIN SERPL-MCNC: 2.4 G/DL (ref 3.5–5.2)
ALBUMIN/GLOB SERPL: 0.7 G/DL
ALP SERPL-CCNC: 99 U/L (ref 39–117)
ALT SERPL W P-5'-P-CCNC: 6 U/L (ref 1–33)
ANION GAP SERPL CALCULATED.3IONS-SCNC: 7 MMOL/L (ref 5–15)
AST SERPL-CCNC: 16 U/L (ref 1–32)
BASOPHILS # BLD AUTO: 0.05 10*3/MM3 (ref 0–0.2)
BASOPHILS NFR BLD AUTO: 1.2 % (ref 0–1.5)
BILIRUB SERPL-MCNC: 0.2 MG/DL (ref 0–1.2)
BUN SERPL-MCNC: 10 MG/DL (ref 8–23)
BUN/CREAT SERPL: 13 (ref 7–25)
CALCIUM SPEC-SCNC: 8.6 MG/DL (ref 8.6–10.5)
CHLORIDE SERPL-SCNC: 103 MMOL/L (ref 98–107)
CO2 SERPL-SCNC: 31 MMOL/L (ref 22–29)
CREAT SERPL-MCNC: 0.77 MG/DL (ref 0.57–1)
CRP SERPL-MCNC: 9.48 MG/DL (ref 0–0.5)
DEPRECATED RDW RBC AUTO: 51.8 FL (ref 37–54)
EOSINOPHIL # BLD AUTO: 0.4 10*3/MM3 (ref 0–0.4)
EOSINOPHIL NFR BLD AUTO: 9.5 % (ref 0.3–6.2)
ERYTHROCYTE [DISTWIDTH] IN BLOOD BY AUTOMATED COUNT: 15.5 % (ref 12.3–15.4)
ERYTHROCYTE [SEDIMENTATION RATE] IN BLOOD: 54 MM/HR (ref 0–20)
GFR SERPL CREATININE-BSD FRML MDRD: 75 ML/MIN/1.73
GLOBULIN UR ELPH-MCNC: 3.6 GM/DL
GLUCOSE SERPL-MCNC: 88 MG/DL (ref 65–99)
HCT VFR BLD AUTO: 27 % (ref 34–46.6)
HGB BLD-MCNC: 8.5 G/DL (ref 12–15.9)
IMM GRANULOCYTES # BLD AUTO: 0.01 10*3/MM3 (ref 0–0.05)
IMM GRANULOCYTES NFR BLD AUTO: 0.2 % (ref 0–0.5)
LYMPHOCYTES # BLD AUTO: 1.38 10*3/MM3 (ref 0.7–3.1)
LYMPHOCYTES NFR BLD AUTO: 32.6 % (ref 19.6–45.3)
MCH RBC QN AUTO: 28.6 PG (ref 26.6–33)
MCHC RBC AUTO-ENTMCNC: 31.5 G/DL (ref 31.5–35.7)
MCV RBC AUTO: 90.9 FL (ref 79–97)
MONOCYTES # BLD AUTO: 0.78 10*3/MM3 (ref 0.1–0.9)
MONOCYTES NFR BLD AUTO: 18.4 % (ref 5–12)
NEUTROPHILS NFR BLD AUTO: 1.61 10*3/MM3 (ref 1.7–7)
NEUTROPHILS NFR BLD AUTO: 38.1 % (ref 42.7–76)
NRBC BLD AUTO-RTO: 0 /100 WBC (ref 0–0.2)
PLATELET # BLD AUTO: 238 10*3/MM3 (ref 140–450)
PMV BLD AUTO: 9.5 FL (ref 6–12)
POTASSIUM SERPL-SCNC: 3.6 MMOL/L (ref 3.5–5.2)
PROT SERPL-MCNC: 6 G/DL (ref 6–8.5)
RBC # BLD AUTO: 2.97 10*6/MM3 (ref 3.77–5.28)
SODIUM SERPL-SCNC: 141 MMOL/L (ref 136–145)
WBC # BLD AUTO: 4.23 10*3/MM3 (ref 3.4–10.8)

## 2021-07-07 PROCEDURE — 97165 OT EVAL LOW COMPLEX 30 MIN: CPT

## 2021-07-07 PROCEDURE — 73721 MRI JNT OF LWR EXTRE W/O DYE: CPT

## 2021-07-07 PROCEDURE — 97161 PT EVAL LOW COMPLEX 20 MIN: CPT | Performed by: PHYSICAL THERAPIST

## 2021-07-07 PROCEDURE — 85025 COMPLETE CBC W/AUTO DIFF WBC: CPT | Performed by: NURSE PRACTITIONER

## 2021-07-07 PROCEDURE — 86140 C-REACTIVE PROTEIN: CPT | Performed by: NURSE PRACTITIONER

## 2021-07-07 PROCEDURE — 80053 COMPREHEN METABOLIC PANEL: CPT | Performed by: NURSE PRACTITIONER

## 2021-07-07 PROCEDURE — 85651 RBC SED RATE NONAUTOMATED: CPT | Performed by: NURSE PRACTITIONER

## 2021-07-07 RX ADMIN — LIDOCAINE 1 PATCH: 50 PATCH CUTANEOUS at 21:59

## 2021-07-07 RX ADMIN — ISOSORBIDE MONONITRATE 60 MG: 60 TABLET, EXTENDED RELEASE ORAL at 12:56

## 2021-07-07 RX ADMIN — POTASSIUM CHLORIDE 20 MEQ: 750 CAPSULE, EXTENDED RELEASE ORAL at 12:54

## 2021-07-07 RX ADMIN — TRAMADOL HYDROCHLORIDE 50 MG: 50 TABLET, FILM COATED ORAL at 12:52

## 2021-07-07 RX ADMIN — NAPROXEN 375 MG: 375 TABLET ORAL at 17:25

## 2021-07-07 RX ADMIN — SUCRALFATE 1 G: 1 TABLET ORAL at 12:52

## 2021-07-07 RX ADMIN — TRAMADOL HYDROCHLORIDE 50 MG: 50 TABLET, FILM COATED ORAL at 21:59

## 2021-07-07 RX ADMIN — LIDOCAINE 1 PATCH: 50 PATCH CUTANEOUS at 00:41

## 2021-07-07 RX ADMIN — TRAMADOL HYDROCHLORIDE 50 MG: 50 TABLET, FILM COATED ORAL at 17:24

## 2021-07-07 RX ADMIN — FUROSEMIDE 40 MG: 40 TABLET ORAL at 12:53

## 2021-07-07 RX ADMIN — SUCRALFATE 1 G: 1 TABLET ORAL at 17:25

## 2021-07-07 RX ADMIN — SUCRALFATE 1 G: 1 TABLET ORAL at 00:41

## 2021-07-07 RX ADMIN — VALACYCLOVIR 500 MG: 500 TABLET, FILM COATED ORAL at 12:56

## 2021-07-07 RX ADMIN — APIXABAN 5 MG: 5 TABLET, FILM COATED ORAL at 21:59

## 2021-07-07 RX ADMIN — NAPROXEN 375 MG: 375 TABLET ORAL at 12:54

## 2021-07-07 RX ADMIN — CARVEDILOL 25 MG: 25 TABLET, FILM COATED ORAL at 17:26

## 2021-07-07 RX ADMIN — FLUTICASONE PROPIONATE 1 SPRAY: 50 SPRAY, METERED NASAL at 13:07

## 2021-07-07 RX ADMIN — Medication 250 MG: at 12:56

## 2021-07-07 RX ADMIN — SUCRALFATE 1 G: 1 TABLET ORAL at 21:59

## 2021-07-07 RX ADMIN — APIXABAN 5 MG: 5 TABLET, FILM COATED ORAL at 00:41

## 2021-07-07 RX ADMIN — Medication 400 MG: at 12:53

## 2021-07-07 RX ADMIN — Medication 1 TABLET: at 12:53

## 2021-07-07 RX ADMIN — APIXABAN 5 MG: 5 TABLET, FILM COATED ORAL at 12:53

## 2021-07-07 RX ADMIN — PANTOPRAZOLE SODIUM 40 MG: 40 TABLET, DELAYED RELEASE ORAL at 13:00

## 2021-07-07 RX ADMIN — SODIUM CHLORIDE, PRESERVATIVE FREE 10 ML: 5 INJECTION INTRAVENOUS at 00:42

## 2021-07-07 RX ADMIN — GABAPENTIN 300 MG: 300 CAPSULE ORAL at 21:59

## 2021-07-07 RX ADMIN — ISOSORBIDE MONONITRATE 60 MG: 60 TABLET, EXTENDED RELEASE ORAL at 22:03

## 2021-07-07 RX ADMIN — CARVEDILOL 25 MG: 25 TABLET, FILM COATED ORAL at 12:53

## 2021-07-07 NOTE — CONSULTS
INFECTIOUS DISEASES CONSULT NOTE    Patient:  Tawny Shin 65 y.o. female  ROOM # 328/1  YOB: 1955  MRN: [id number]  [facility]:  [id number]  Admit date: 7/6/2021   Admitting Physician: Jun Hawley*  Primary Care Physician: Davon Urrutia MD  REFERRING PROVIDER: No ref. provider found    Reason for Consultation: Recommendations on evaluation and management of chronic wound right hip.  Continuity of care.  Previously treated with long course of antibiotics and placed on cefdinir.    History of Present Illness/Chief Complaint: Pleasant 65-year-old woman.  Known to me from previous consultation.  She had had a prolonged staph aureus bacteremia previously.  She had had paraspinal infection.  She had had previous nonhealing wound at right hip operative site where she had undergone attempt at reattachment of torn gluteus muscle.  She was readmitted following a fall at home.  She had had some altered mental status.  She seems to be back more toward baseline.  She has not had a lot of ongoing right hip pain.  Previously when hospitalized on acute care and over the [facility] rehab she had had more significant discomfort.  Currently she is able to go from sitting to standing seemingly without much pain.  Because of her persistent open wound right lateral thigh area, infectious disease asked to evaluate and offer recommendations.    Current Scheduled Medications:   apixaban, 5 mg, Oral, Q12H  carvedilol, 25 mg, Oral, BID With Meals  fluticasone, 1 spray, Nasal, Daily  furosemide, 40 mg, Oral, Daily  gabapentin, 300 mg, Oral, Nightly  isosorbide mononitrate, 60 mg, Oral, BID  levothyroxine, 75 mcg, Oral, Q AM  lidocaine, 1 patch, Transdermal, Nightly  magnesium oxide, 400 mg, Oral, Daily  multivitamin with minerals, 1 tablet, Oral, Daily  naproxen, 375 mg, Oral, BID With Meals  pantoprazole, 40 mg, Oral, Daily  potassium chloride, 20 mEq, Oral, Daily  saccharomyces boulardii, 250 mg, Oral, Daily  sodium  "chloride, 10 mL, Intravenous, Q12H  sucralfate, 1 g, Oral, 4x Daily AC & at Bedtime  traMADol, 50 mg, Oral, TID  valACYclovir, 500 mg, Oral, Q24H      Current PRN Medications:  •  acetaminophen **OR** acetaminophen **OR** acetaminophen  •  nitroglycerin  •  ondansetron **OR** ondansetron  •  sodium chloride    Allergies:    Allergies   Allergen Reactions   • Atorvastatin Other (See Comments)     LEG CRAMPS     • Amoxicillin Rash   • Escitalopram Rash   • Nabumetone Rash   • Niacin Er Rash   • Penicillins Rash   • Simvastatin Rash     Past Medical History: Right hip gluteus muscle tear.  Cervical spine stenosis.  Lumbar discitis.  Lumbar spine stenosis.  Methicillin susceptible Staphylococcus aureus bacteremia.Degenerative arthritis.  Coronary artery disease.  Herpes simplex.  Hypertension.  Rheumatoid arthritis.  Sjogren's syndrome.  Sensorineural hearing loss.     Past Surgical History: Two aspirations of right psoas abscess.  Cervical spine decompression.  Lumbar discitis debridement/laminectomy.Pacemaker insertion.  Cataract extraction.  Coronary artery stenting.  Knee arthroplasty.  Lumbar laminectomy.  Myringotomy tube placement.     Social History: .  No tobacco, alcohol, or illicit drug use.     Family History: Cancer and heart disease.     Exposure History: No close contacts have been ill    Review of Systems   Respiratory: Negative for cough and shortness of breath.    Gastrointestinal: Negative for abdominal pain, diarrhea and nausea.   Genitourinary: Negative for dysuria.      Vital Signs:  /74 (BP Location: Left arm, Patient Position: Lying)   Pulse 78   Temp 98.9 °F (37.2 °C) (Oral)   Resp 16   Ht 167.6 cm (65.98\")   Wt 89.9 kg (198 lb 1.6 oz)   SpO2 90%   BMI 31.99 kg/m²  Temp (24hrs), Av.5 °F (36.9 °C), Min:98.1 °F (36.7 °C), Max:99 °F (37.2 °C)    Physical Exam  Vital signs - reviewed.  Line/IV site - No erythema or tenderness.  Alert, pleasant, no distress  Lungs clear " without crackles  Pacemaker site left upper chest without erythema or swelling  Heart without significant murmur  Abdomen soft nontender  Right hip has wound that remains open.  It does appear to continue to track to bone.  There is granulation on the edges, no real purulent exudate, no odor.  The wound has basically remained stable and has not shown signs of granulating deeply and closing.  No surrounding erythema to suggest cellulitis.    Lab Results:  CBC:   Results from last 7 days   Lab Units 07/07/21  0550 07/06/21 1946   WBC 10*3/mm3 4.23 5.16   HEMOGLOBIN g/dL 8.5* 9.7*   HEMATOCRIT % 27.0* 31.5*   PLATELETS 10*3/mm3 238 297     CMP:   Results from last 7 days   Lab Units 07/07/21  0550 07/06/21 1946   SODIUM mmol/L 141 139   POTASSIUM mmol/L 3.6 3.6   CHLORIDE mmol/L 103 100   CO2 mmol/L 31.0* 29.0   BUN mg/dL 10 11   CREATININE mg/dL 0.77 0.90   CALCIUM mg/dL 8.6 9.2   BILIRUBIN mg/dL 0.2  --    ALK PHOS U/L 99  --    ALT (SGPT) U/L 6  --    AST (SGOT) U/L 16  --    GLUCOSE mg/dL 88 136*     Radiology:  MRI right hip today:  Summary:  1. Large deep soft tissue wound adjacent to the right hip.  2. Diffuse right gluteus muscle edema with no drainable abscess  collection.  3. Increased marrow edema within the proximal right femur compatible  with osteomyelitis.  This report was finalized on 07/07/2021 16:48 by Dr. Jesus Manuel Santos MD.    XR pelvis:  IMPRESSION:  1. There appears to be interval progression of lucency at the right  femur greater trochanter compared to CT 5/26/2021. Persistent overlying  soft tissue defect raises concern for chronic or treated osteomyelitis.  This report was finalized on 07/06/2021 20:06 by Dr Paulina Tavares MD.    Additional Studies Reviewed:     Impression:   Chronic wound right thigh.  She had a wound infection and dehiscence following gluteal muscle repair.  Hard to know whether the MRI changes represent osteomyelitis or some reactive marrow change in the setting of her  chronic wound.  Do feel chronic osteomyelitis remains a possibility.    Recommendations:    I know things will get better with antibiotics alone  Agree with surgical evaluation  She may benefit from some debridement of involved trochanter with deep cultures  We could then direct antibiotic treatment at culture results  Eventually I think she is going to need some type of flap closure  Orthopedic surgeon evaluating  Opinion from vascular surgery or plastic surgery for consideration of flap closure would be an option as well  Continue to follow  Hold on any antibiotic treatment at present    Elie Ohara MD  07/07/21  13:23 CDT

## 2021-07-07 NOTE — OUTREACH NOTE
Medical Week 3 Survey      Responses   [facility] patient discharged from?  [place]   Does the patient have one of the following disease processes/diagnoses(primary or secondary)?  Other   Week 3 attempt successful?  No   Unsuccessful attempts  Attempt 1   Revoke  Readmitted          Luz Marina Benavides RN

## 2021-07-07 NOTE — PLAN OF CARE
Goal Outcome Evaluation:  Plan of Care Reviewed With: patient        Progress: no change  Outcome Summary: PT evaluation completed. The patient presents alert and oriented x4 however she is confused at times during conversation. She still has a large open wound on her R hip that was treated with a wound vac. Ortho would like to leave wet to dry dressings for now and possibly start wound vac dressings at a later date. The patient demonstrates R LE weakness that is chronic. She is able to walk short distances to the restroom, but fatigues with this. She will benefit from skilled PT services to work on endurance and strengthening with activity. She does demonstrates some confusion at times and is not safe to be home alone. Recommend placement at an Assistive Living Facility if family and patient are willing. Otherwise SNF or home with 24/7 care will be needed.

## 2021-07-07 NOTE — NURSING NOTE
Patient is current with [facility] services and services will continue upon hospital discharge.

## 2021-07-07 NOTE — CASE MANAGEMENT/SOCIAL WORK
Discharge Planning Assessment  [facility]     Patient Name: Tawny Shin  MRN: [id number]  Today's Date: 7/7/2021    Admit Date: 7/6/2021    Discharge Needs Assessment     Row Name 07/07/21 1347       Living Environment    Current Living Arrangements  home/apartment/condo    Primary Care Provided by  self    Provides Primary Care For  no one    Family Caregiver if Needed  child(mary jo), adult;sibling(s)    Quality of Family Relationships  involved       Resource/Environmental Concerns    Resource/Environmental Concerns  none       Discharge Needs Assessment    Discharge Coordination/Progress  PT resides at home and has family to assist. PT is currently confused and unable to participate in dc planning assessment. Orthopedics not planning surgery at this time. Awaiting ID to see and make recommendations. PT is current with [facility]. Will follow and update dc plan.        Discharge Plan    No documentation.       Continued Care and Services - Admitted Since 7/6/2021    Coordination has not been started for this encounter.         Demographic Summary    No documentation.       Functional Status    No documentation.       Psychosocial    No documentation.       Abuse/Neglect    No documentation.       Legal    No documentation.       Substance Abuse    No documentation.       Patient Forms    No documentation.           GRISELDA Humphrey

## 2021-07-07 NOTE — PLAN OF CARE
Goal Outcome Evaluation:  Plan of Care Reviewed With: patient        Progress: no change  Outcome Summary: Pt admitted from ED. Wound Vac not in place but pt did bring with, Wet to dry gauze dressing placed, cdi. Pt denies pain. NPO. Resting between care. Alert but can be disoriented and forgetful at times.

## 2021-07-07 NOTE — H&P
[facility] Medicine Services  HISTORY AND PHYSICAL    Date of Admission: 7/6/2021  Primary Care Physician: Davon Urrutia MD    Subjective     Chief Complaint: Altered mental status    History of Present Illness  Tawny Shin is a 65-year-old female with a vast medical history hypertension, hyperlipidemia, Sojourn's disease, coronary artery disease with MI, iliopsoas abscess, discitis, osteomyelitis of lumbar spine, Staph aureus bacteremia, chronic pain syndrome.  Patient has undergone debridement of right hip nonhealing incision.  Patient recently admitted 6/22-6/23, 2021 with mechanical fall, concussion and diagnosis of right lower extremity extensive DVT.  Patient was discharged on Eliquis.  She states she ran out yesterday and has been without treatment for 24 hours.  She presented to [facility] emergency department today with a 4-day history of worsening altered mental status after another fall which time she did hit her head.  Son, Gee Shin, neurologist did discuss concerns with ER provider.  ER work-up unremarkable except for x-ray of the pelvis that reveals persistent overlying soft tissue defect concerning for chronic osteomyelitis.  Patient has been given aztreonam and vancomycin in the emergency department.  Will hold further antibiotics until orthopedics has assessed and made recommendations.  I did admit this patient 6/22/2021 and she is more altered than previously.  Patient is forgetful.  She thinks her orthopedic surgeon is Dr. Shea.  She states she is hurting when asked if it was chronic pain versus new pain she states it is her back and shoulders currently scored 4 on a scale of 1-10.  She states she has weak legs.  Sister is at bedside assisting with history.  She continues to have bilateral lower extremity edema basically unchanged from previous admission.  She is admitted for further evaluation treatment.    Review of Systems   A 10  point review of systems was completed, all negative except for those discussed in HPI    Past Medical History:   Past Medical History:   Diagnosis Date   • Arthritis    • Asthma    • Chronic sinusitis    • Coronary artery disease    • Eustachian tube dysfunction    • Heart disease    • Herpes simplex    • Hyperlipidemia    • Hypertension    • Knee dislocation    • Labral tear of right hip joint    • Laryngitis sicca    • Laryngitis, chronic    • MI (myocardial infarction) (CMS/HCC)    • Otorrhea    • Sensorineural hearing loss    • Sjogren's disease (CMS/HCC)        Past Surgical History:   Past Surgical History:   Procedure Laterality Date   • A-V CARDIAC PACEMAKER INSERTION  2016   • ATRIAL CARDIAC PACEMAKER INSERTION     • CARDIAC CATHETERIZATION     • CATARACT EXTRACTION     • CERVICAL CORPECTOMY N/A 3/3/2021    Procedure: CERVICAL 6 CORPECTOMY WITH TITANIUM CAGE WITH NEURO MONITORING;  Surgeon: Banadr Shea MD;  Location:  [facility] OR;  Service: Neurosurgery;  Laterality: N/A;   • COLONOSCOPY  11/08/2011    One fold in the ascending colon which showed ulcer otherwise normal exam   • COLONOSCOPY  11/12/2004    Normal exam repeat in five years   • CORONARY ANGIOPLASTY WITH STENT PLACEMENT      X 2; 2013 & 2014   • ENDOSCOPY  07/10/2014    Normal exam   • HIP ABDUCTION TENOTOMY BILATERAL Right 1/14/2021    Procedure: RIGHT HIP GLUTEUS MEDLUS / MINIMUS REPAIR, POSSIBLE ACHILLES ALLOGRAFT;  Surgeon: Nino Carlson MD;  Location:  [facility] OR;  Service: Orthopedics;  Laterality: Right;   • INCISION AND DRAINAGE HIP Right 2/9/2021    Procedure: HIP INCISION AND DRAINAGE;  Surgeon: Nino Carlson MD;  Location:  [facility] OR;  Service: Orthopedics;  Laterality: Right;   • JOINT REPLACEMENT     • LUMBAR DISCECTOMY Right 3/23/2021    Procedure: LUMBAR DISCECTOMY MICRO, Lumbar 1/2 right;  Surgeon: Bandar Shea MD;  Location:  [facility] OR;  Service: Neurosurgery;  Laterality: Right;   • LUMBAR FUSION N/A 1/19/2018     Procedure: L3-4,L4-5 DECOMPRESSION, POSTERIOR SPINAL FUSION WITH INSTRUMENTATION;  Surgeon: Fortino Oropeza MD;  Location:  [facility] OR;  Service:    • LUMBAR LAMINECTOMY WITH FUSION Left 1/17/2018    Procedure: LEFT L3-4 L4-5 LATERAL LUMBAR INTERBODY FUSION;  Surgeon: Fortino Oropeza MD;  Location:  [facility] OR;  Service:    • MYRINGOTOMY W/ TUBES  09/04/2014    TUBES NO LONGER IN PLACE   • OTHER SURGICAL HISTORY      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 so hardware was removed and spacers were placed   • REPLACEMENT TOTAL KNEE Right        Family History: family history includes Cancer in her mother; Heart disease in her father.    Social History:  reports that she has never smoked. She has never used smokeless tobacco. She reports that she does not drink alcohol and does not use drugs.    Code Status: Full, if unable speak for herself her sister Skye Stone will speak for her      Allergies:  Allergies   Allergen Reactions   • Atorvastatin Other (See Comments)     LEG CRAMPS     • Amoxicillin Rash   • Escitalopram Rash   • Nabumetone Rash   • Niacin Er Rash   • Penicillins Rash   • Simvastatin Rash       Medications:  Prior to Admission medications    Medication Sig Start Date End Date Taking? Authorizing Provider   apixaban (ELIQUIS) 5 MG tablet tablet 1 twice daily  Indications: DVT/PE (active thrombosis) 7/1/21   Davon Urrutia MD   carvedilol (COREG) 25 MG tablet Take 1 tablet by mouth 2 (Two) Times a Day With Meals. 7/1/21   Davon Urrutia MD   fluticasone (FLONASE) 50 MCG/ACT nasal spray 1 spray into the nostril(s) as directed by provider Daily. 5/1/21   Leila Daly MD   furosemide (LASIX) 40 MG tablet Take 40 mg by mouth Daily.    Leila Daly MD   gabapentin (NEURONTIN) 300 MG capsule Take 300 mg by mouth Every Night. 4/12/21 7/1/21  Leila Daly MD   isosorbide mononitrate (IMDUR) 60 MG 24 hr tablet Take 1 tablet by mouth 2 (Two) Times a Day. 3/26/21   Taiwo Prado APRN  "  levothyroxine (SYNTHROID, LEVOTHROID) 75 MCG tablet Take 1 tablet by mouth Daily. 6/22/21   Davon Urrutia MD   lidocaine (LIDODERM) 5 % Place 1 patch on the skin as directed by provider Daily. Remove & Discard patch within 12 hours or as directed by Leila Castañeda MD   magnesium oxide (MAG-OX) 400 MG tablet Take 400 mg by mouth. 4/12/21   Leila Daly MD   multivitamin with minerals tablet tablet Take 1 tablet by mouth Daily. 3/6/21   Taiwo Prado APRN   Naloxegol Oxalate (Movantik) 25 MG tablet Take 1 tablet by mouth Every Morning. 6/22/21   Davon Urrutia MD   nitroglycerin (Nitrostat) 0.4 MG SL tablet Place 1 tablet under the tongue Every 5 (Five) Minutes As Needed for Chest Pain. Take no more than 3 doses in 15 minutes. 3/26/21   Taiwo Prado APRN   ondansetron (ZOFRAN) 4 MG tablet Take 1 tablet by mouth Every 6 (Six) Hours As Needed for Nausea or Vomiting. 3/5/21   Taiwo Prado APRN   pantoprazole (PROTONIX) 40 MG EC tablet Take 1 tablet by mouth Daily. 6/22/21   Davon Urrutia MD   potassium chloride (K-DUR,KLOR-CON) 20 MEQ CR tablet Take 20 mEq by mouth Daily. 4/12/21   Leila Daly MD   saccharomyces boulardii (Florastor) 250 MG capsule Take 1 capsule by mouth Daily. 7/1/21   Davon Urrutia MD   salsalate (DISALCID) 500 MG tablet Take 1 tablet by mouth 4 (Four) Times a Day. 6/22/21   Davon Urrutia MD   sucralfate (CARAFATE) 1 g tablet Take 1 g by mouth 4 (Four) Times a Day Before Meals & at Bedtime.    Leila Daly MD   traMADol (ULTRAM) 50 MG tablet Take 50 mg by mouth 3 (Three) Times a Day.    Leila Daly MD   valACYclovir (VALTREX) 500 MG tablet Take 1 tablet by mouth Daily. Indications: chronic suppressive therapy 6/22/21   Davon Urrutia MD       Objective     /84   Pulse 84   Temp 98.3 °F (36.8 °C)   Resp 16   Ht 167.6 cm (66\")   Wt 86.2 kg (190 lb)   SpO2 94%   BMI 30.67 kg/m²   Physical Exam  Vitals reviewed. "   Constitutional:       Appearance: She is obese.   HENT:      Head: Normocephalic and atraumatic.      Mouth/Throat:      Mouth: Mucous membranes are moist.      Pharynx: Oropharynx is clear.   Eyes:      Extraocular Movements: Extraocular movements intact.      Pupils: Pupils are equal, round, and reactive to light.   Cardiovascular:      Rate and Rhythm: Normal rate and regular rhythm.   Pulmonary:      Effort: Pulmonary effort is normal.      Breath sounds: Normal breath sounds.   Abdominal:      Palpations: Abdomen is soft.      Comments: Protuberant   Musculoskeletal:      Cervical back: Normal range of motion and neck supple.      Right lower leg: Edema present.      Left lower leg: Edema present.      Comments: Generalized weakness and debility   Skin:     General: Skin is warm and dry.   Neurological:      Mental Status: She is alert.      Comments: Disorientation at times   Psychiatric:         Mood and Affect: Mood normal.         Behavior: Behavior normal.       Pertinent Data:   Lab Results (last 72 hours)     Procedure Component Value Units Date/Time    COVID-19,[facility] IN-HOUSE,Nasal Swab No Transport Media 3-4 HR TAT - Swab, Nasal Cavity [[id number]]  (Normal) Collected: 07/06/21 2052    Specimen: Swab from Nasal Cavity Updated: 07/06/21 2140     COVID19 Not Detected    Blood Culture - Blood, Arm, Right [[id number]] Collected: 07/06/21 2052    Specimen: Blood from Arm, Right Updated: 07/06/21 2116    Urinalysis With Culture If Indicated - Urine, Clean Catch [[id number]]  (Abnormal) Collected: 07/06/21 2040    Specimen: Urine, Clean Catch Updated: 07/06/21 2106     Color, UA Yellow     Appearance, UA Clear     pH, UA <=5.0     Specific Gravity, UA 1.014     Glucose, UA Negative     Ketones, UA Negative     Bilirubin, UA Negative     Blood, UA Negative     Protein, UA Negative     Leuk Esterase, UA Small (1+)     Nitrite, UA Negative     Urobilinogen, UA 0.2 E.U./dL    Urinalysis, Microscopic Only -  Urine, Clean Catch [[id number]]  (Abnormal) Collected: 07/06/21 2040    Specimen: Urine, Clean Catch Updated: 07/06/21 2106     RBC, UA 0-2 /HPF      WBC, UA 6-12 /HPF      Bacteria, UA None Seen /HPF      Squamous Epithelial Cells, UA 13-20 /HPF      Hyaline Casts, UA 3-6 /LPF      Methodology Automated Microscopy    Urine Culture - Urine, Urine, Clean Catch [[id number]] Collected: 07/06/21 2040    Specimen: Urine, Clean Catch Updated: 07/06/21 2106    Blood Gas, Venous - [[id number]]  (Abnormal) Collected: 07/06/21 2045    Specimen: Venous Blood Updated: 07/06/21 2052     Site OTHER     pH, Venous 7.383 pH Units      pCO2, Venous 55.1 mm Hg      Comment: 83 Value above reference range        pO2, Venous 29.3 mm Hg      HCO3, Venous 32.8 mmol/L      Comment: 83 Value above reference range        Base Excess, Venous 6.1 mmol/L      Comment: 83 Value above reference range        O2 Saturation, Venous 44.5 %      Comment: 84 Value below reference range        Temperature 37.0 C      Barometric Pressure for Blood Gas 750 mmHg      Modality Room Air     Ventilator Mode NA     Collected by [id number]     Comment: Meter: [id number]     :  [id number]       C-reactive Protein [[id number]]  (Abnormal) Collected: 07/06/21 1946    Specimen: Blood Updated: 07/06/21 2050     C-Reactive Protein 12.16 mg/dL     Sedimentation Rate [[id number]]  (Abnormal) Collected: 07/06/21 1946    Specimen: Blood Updated: 07/06/21 2039     Sed Rate 101 mm/hr     Procalcitonin [[id number]]  (Normal) Collected: 07/06/21 1946    Specimen: Blood Updated: 07/06/21 2026     Procalcitonin 0.07 ng/mL     Basic Metabolic Panel [[id number]]  (Abnormal) Collected: 07/06/21 1946    Specimen: Blood Updated: 07/06/21 2022     Glucose 136 mg/dL      BUN 11 mg/dL      Creatinine 0.90 mg/dL      Sodium 139 mmol/L      Potassium 3.6 mmol/L      Chloride 100 mmol/L      CO2 29.0 mmol/L      Calcium 9.2 mg/dL      eGFR Non African Amer 62 mL/min/1.73       BUN/Creatinine Ratio 12.2     Anion Gap 10.0 mmol/L     CK [[id number]]  (Normal) Collected: 07/06/21 1946    Specimen: Blood Updated: 07/06/21 2022     Creatine Kinase 35 U/L     Lactic Acid, Plasma [[id number]]  (Normal) Collected: 07/06/21 1946    Specimen: Blood Updated: 07/06/21 2011     Lactate 1.8 mmol/L     aPTT [[id number]]  (Abnormal) Collected: 07/06/21 1946    Specimen: Blood Updated: 07/06/21 2006     PTT 47.8 seconds     Protime-INR [[id number]]  (Abnormal) Collected: 07/06/21 1946    Specimen: Blood Updated: 07/06/21 2006     Protime 17.4 Seconds      INR 1.55    CBC Auto Differential [[id number]]  (Abnormal) Collected: 07/06/21 1946    Specimen: Blood Updated: 07/06/21 1959     WBC 5.16 10*3/mm3      RBC 3.43 10*6/mm3      Hemoglobin 9.7 g/dL      Hematocrit 31.5 %      MCV 91.8 fL      MCH 28.3 pg      MCHC 30.8 g/dL      RDW 15.4 %      RDW-SD 51.9 fl      MPV 9.4 fL      Platelets 297 10*3/mm3      Neutrophil % 52.6 %      Lymphocyte % 23.6 %      Monocyte % 14.5 %      Eosinophil % 8.3 %      Basophil % 0.8 %      Immature Grans % 0.2 %      Neutrophils, Absolute 2.71 10*3/mm3      Lymphocytes, Absolute 1.22 10*3/mm3      Monocytes, Absolute 0.75 10*3/mm3      Eosinophils, Absolute 0.43 10*3/mm3      Basophils, Absolute 0.04 10*3/mm3      Immature Grans, Absolute 0.01 10*3/mm3      nRBC 0.0 /100 WBC     Blood Culture - Blood, Arm, Right [[id number]] Collected: 07/06/21 1946    Specimen: Blood from Arm, Right Updated: 07/06/21 1956        Imaging Results (Last 24 Hours)     Procedure Component Value Units Date/Time    CT Head Without Contrast [[id number]] Collected: 07/06/21 2015     Updated: 07/06/21 2021    Narrative:      EXAM: CT HEAD WO CONTRAST- - 7/6/2021 8:06 PM CDT     HISTORY: Altered mental status       COMPARISON: 6/22/2021.      DOSE LENGTH PRODUCT: 724 mGy cm. Automated exposure control was also  utilized to decrease patient radiation dose.     TECHNIQUE: Unenhanced CT images obtained from  vertex to skull base with  multiplanar reformats.     FINDINGS:  No acute intracranial hemorrhage, midline shift, mass effect or large  territory cortical infarct. Ventricles, basilar cisterns and sulci have  proportional prominence suggesting volume loss. Periventricular and  subcortical white matter hypodensities, most likely due to chronic  microvascular disease. Vascular calcifications.     Thinning of the bilateral ocular lenses, which may be age-related or  postoperative. Paranasal sinus mucosal thickening. Small left mastoid  effusion. No right mastoid effusion. External auditory canals patent.  Calvarium appears intact. Subcutaneous tissues within normal limits.          Impression:      1. No acute intracranial findings.   2. Chronic microvascular changes, volume loss and vascular  calcifications.  3. Paranasal sinus mucosal thickening and small left mastoid effusion.  This report was finalized on 07/06/2021 20:18 by Dr Paulina Tavares MD.    XR Pelvis 1 or 2 View [[id number]] Collected: 07/06/21 2002     Updated: 07/06/21 2010    Narrative:      EXAM: XR PELVIS 1 OR 2 VW- - 7/6/2021 7:59 PM CDT     HISTORY: recent infected hip, open wound       COMPARISON: 5/26/2021.      TECHNIQUE:  1 images.  AP view of the pelvis     FINDINGS:    Pelvis appears intact. Sacroiliac joints symmetric with degenerative  changes. Sacral arcuate lines limited due to technique and overlying  bowel gas. Partially visualized lumbar fixation hardware. Pubic  symphysis anatomic.     Lucency at the right femur, greater trochanter. There appears to be  interval progression of lucency compared to 5/26/2021 CT. Persistent  overlying soft tissue defect raises concern for chronic or treated  osteomyelitis.     Degenerative changes of the bilateral hip. Gluteal granuloma projecting  lateral to the left iliac wing.          Impression:      1. There appears to be interval progression of lucency at the right  femur greater trochanter  compared to CT 5/26/2021. Persistent overlying  soft tissue defect raises concern for chronic or treated osteomyelitis.  This report was finalized on 07/06/2021 20:06 by Dr Paulina Tavares MD.    XR Chest 1 View [[id number]] Collected: 07/06/21 2001     Updated: 07/06/21 2005    Narrative:      EXAM: XR CHEST 1 VW- - 7/6/2021 7:59 PM CDT     HISTORY: Altered mental status       COMPARISON: 6/22/2021.      TECHNIQUE:  1 images.  Frontal view of the chest.     FINDINGS:    Cardiac pulse generator at the left chest with 2 grossly intact leads.  No pneumothorax, pleural effusion or focal consolidation. Right upper  lung granuloma. Cardiac mediastinal silhouette appear within normal  limits. Corpectomy and fixation hardware at the cervical spine. No acute  bony finding.          Impression:      1. No acute cardiopulmonary findings.  This report was finalized on 07/06/2021 20:02 by Dr Paulina Tavares MD.          Assessment / Plan     Assessment:   Active Hospital Problems    Diagnosis    • **Osteomyelitis of right femur (CMS/HCC)    • Transient alteration of awareness    • Non-healing surgical wound, right hip    • Chronic deep vein thrombosis (DVT) of right lower extremity (CMS/HCC)    • Chronic pain syndrome        Plan:   1.  Admit as inpatient  2.  Consult orthopedics in a.m.  3.  Consult wound nurse  4.  Cleanse wound with normal saline, wet-to-dry dressings  5.  Home medications reviewed and restarted as appropriate  6.  DVT prophylaxis with ongoing Eliquis  7.  Antibiotics received in the emergency department, await orthopedic recommendation for further treatment  8.  Labs in a.m., blood culture results pending    I discussed the patient's findings and my recommendations with: Kris Cade MD  Time spent: 35 minutes    Patient seen and examined by me on 7/6/2021 at 10:12 PM.    Electronically signed by DANIELA Miranda, 07/06/21, 23:40 CDT.

## 2021-07-07 NOTE — NURSING NOTE
Pt alert. She states she is in a parking lot. She has pulled her IV out and placed it on the table.

## 2021-07-07 NOTE — ED PROVIDER NOTES
Subjective   66 y/o female with recent complicated history. In short in 1/14 she had a right gluteal muscle repair. Around a month after that she presented with AMS and found to have a post operative infection (MSSA) and underwent debridement of the wound. She presented here again for discitis and most recently (last month) was here for fall, concussion and DVT. She is on anticoagulation but has run out of it as of yesterday. She presents today for evaluation of intermittent but worsening confusion over the last 4 days after a mechanical fall from standing in which she hit an object with her head. Her son, a neurologist, did call me to express his concern given her worsening confusion since the fall and head trauma. Upon my arrival she is alert and oriented but does not recall me taking care of her on one of her recent visits. She denies any and all pain and all other issues. She arrives in NAD          Review of Systems   All other systems reviewed and are negative.      Past Medical History:   Diagnosis Date   • Arthritis    • Asthma    • Chronic sinusitis    • Coronary artery disease    • Eustachian tube dysfunction    • Heart disease    • Herpes simplex    • Hyperlipidemia    • Hypertension    • Knee dislocation    • Labral tear of right hip joint    • Laryngitis sicca    • Laryngitis, chronic    • MI (myocardial infarction) (CMS/HCC)    • Otorrhea    • Sensorineural hearing loss    • Sjogren's disease (CMS/HCC)        Allergies   Allergen Reactions   • Atorvastatin Other (See Comments)     LEG CRAMPS     • Amoxicillin Rash   • Escitalopram Rash   • Nabumetone Rash   • Niacin Er Rash   • Penicillins Rash   • Simvastatin Rash       Past Surgical History:   Procedure Laterality Date   • A-V CARDIAC PACEMAKER INSERTION  2016   • ATRIAL CARDIAC PACEMAKER INSERTION     • CARDIAC CATHETERIZATION     • CATARACT EXTRACTION     • CERVICAL CORPECTOMY N/A 3/3/2021    Procedure: CERVICAL 6 CORPECTOMY WITH TITANIUM CAGE WITH  NEURO MONITORING;  Surgeon: Bandar Shea MD;  Location:  [facility];  Service: Neurosurgery;  Laterality: N/A;   • COLONOSCOPY  11/08/2011    One fold in the ascending colon which showed ulcer otherwise normal exam   • COLONOSCOPY  11/12/2004    Normal exam repeat in five years   • CORONARY ANGIOPLASTY WITH STENT PLACEMENT      X 2; 2013 & 2014   • ENDOSCOPY  07/10/2014    Normal exam   • HIP ABDUCTION TENOTOMY BILATERAL Right 1/14/2021    Procedure: RIGHT HIP GLUTEUS MEDLUS / MINIMUS REPAIR, POSSIBLE ACHILLES ALLOGRAFT;  Surgeon: Nino Carlson MD;  Location:  [facility];  Service: Orthopedics;  Laterality: Right;   • INCISION AND DRAINAGE HIP Right 2/9/2021    Procedure: HIP INCISION AND DRAINAGE;  Surgeon: Nino Carlson MD;  Location:  [facility];  Service: Orthopedics;  Laterality: Right;   • JOINT REPLACEMENT     • LUMBAR DISCECTOMY Right 3/23/2021    Procedure: LUMBAR DISCECTOMY MICRO, Lumbar 1/2 right;  Surgeon: Bandar Shea MD;  Location:  [facility];  Service: Neurosurgery;  Laterality: Right;   • LUMBAR FUSION N/A 1/19/2018    Procedure: L3-4,L4-5 DECOMPRESSION, POSTERIOR SPINAL FUSION WITH INSTRUMENTATION;  Surgeon: Fortino Oropeza MD;  Location:  [facility];  Service:    • LUMBAR LAMINECTOMY WITH FUSION Left 1/17/2018    Procedure: LEFT L3-4 L4-5 LATERAL LUMBAR INTERBODY FUSION;  Surgeon: Fortino Oropeza MD;  Location:  [facility];  Service:    • MYRINGOTOMY W/ TUBES  09/04/2014    TUBES NO LONGER IN PLACE   • OTHER SURGICAL HISTORY      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 so hardware was removed and spacers were placed   • REPLACEMENT TOTAL KNEE Right        Family History   Problem Relation Age of Onset   • Cancer Mother    • Heart disease Father        Social History     Socioeconomic History   • Marital status:      Spouse name: Not on file   • Number of children: Not on file   • Years of education: Not on file   • Highest education level: Not on file   Tobacco Use   • Smoking status:  Never Smoker   • Smokeless tobacco: Never Used   Substance and Sexual Activity   • Alcohol use: No   • Drug use: Never   • Sexual activity: Defer           Objective   Physical Exam  Vitals and nursing note reviewed.   Constitutional:       Appearance: She is well-developed.   HENT:      Head: Normocephalic and atraumatic.      Mouth/Throat:      Mouth: Mucous membranes are moist.   Eyes:      Extraocular Movements: Extraocular movements intact.      Pupils: Pupils are equal, round, and reactive to light.   Cardiovascular:      Rate and Rhythm: Normal rate and regular rhythm.      Heart sounds: Normal heart sounds.   Pulmonary:      Effort: Pulmonary effort is normal.      Breath sounds: Normal breath sounds.   Abdominal:      General: Bowel sounds are normal.      Palpations: Abdomen is soft.   Musculoskeletal:         General: Normal range of motion.      Cervical back: Normal range of motion and neck supple.      Comments: Large wound in right hip, pink tissues, no signs of infection nor drainage.    Skin:     General: Skin is warm.      Capillary Refill: Capillary refill takes less than 2 seconds.   Neurological:      Mental Status: She is alert and oriented to person, place, and time.      GCS: GCS eye subscore is 4. GCS verbal subscore is 5. GCS motor subscore is 6.      Cranial Nerves: No cranial nerve deficit or dysarthria.      Sensory: No sensory deficit.      Motor: No weakness.      Deep Tendon Reflexes: Reflexes normal.   Psychiatric:         Mood and Affect: Mood normal. Mood is not anxious or depressed.         Speech: Speech normal.         Behavior: Behavior is not agitated.         Cognition and Memory: Cognition is not impaired.         Procedures           ED Course  ED Course as of Jul 06 2152   Tue Jul 06, 2021 2135 Radiology is calling possible osteo. Her CRP and ESR have both increased since her last (her CRP more than doubled). Given this and other obvious source for her confusion we will  admit for IV and further dedicated studies to the hip.    [JH]      ED Course User Index  [JH] Shaheed Shaw MD      CT Head Without Contrast   Final Result   1. No acute intracranial findings.    2. Chronic microvascular changes, volume loss and vascular   calcifications.   3. Paranasal sinus mucosal thickening and small left mastoid effusion.   This report was finalized on 07/06/2021 20:18 by Dr Paulina Tavares MD.      XR Chest 1 View   Final Result   1. No acute cardiopulmonary findings.   This report was finalized on 07/06/2021 20:02 by Dr Paulina Tavares MD.      XR Pelvis 1 or 2 View   Final Result   1. There appears to be interval progression of lucency at the right   femur greater trochanter compared to CT 5/26/2021. Persistent overlying   soft tissue defect raises concern for chronic or treated osteomyelitis.   This report was finalized on 07/06/2021 20:06 by Dr Paulina Tavares MD.        Labs Reviewed   BASIC METABOLIC PANEL - Abnormal; Notable for the following components:       Result Value    Glucose 136 (*)     All other components within normal limits    Narrative:     GFR Normal >60  Chronic Kidney Disease <60  Kidney Failure <15     PROTIME-INR - Abnormal; Notable for the following components:    Protime 17.4 (*)     INR 1.55 (*)     All other components within normal limits   APTT - Abnormal; Notable for the following components:    PTT 47.8 (*)     All other components within normal limits   URINALYSIS W/ CULTURE IF INDICATED - Abnormal; Notable for the following components:    Leuk Esterase, UA Small (1+) (*)     All other components within normal limits   CBC WITH AUTO DIFFERENTIAL - Abnormal; Notable for the following components:    RBC 3.43 (*)     Hemoglobin 9.7 (*)     Hematocrit 31.5 (*)     MCHC 30.8 (*)     Monocyte % 14.5 (*)     Eosinophil % 8.3 (*)     Eosinophils, Absolute 0.43 (*)     All other components within normal limits   C-REACTIVE PROTEIN - Abnormal; Notable for the  "following components:    C-Reactive Protein 12.16 (*)     All other components within normal limits   SEDIMENTATION RATE - Abnormal; Notable for the following components:    Sed Rate 101 (*)     All other components within normal limits   BLOOD GAS, VENOUS - Abnormal; Notable for the following components:    pCO2, Venous 55.1 (*)     HCO3, Venous 32.8 (*)     Base Excess, Venous 6.1 (*)     O2 Saturation, Venous 44.5 (*)     All other components within normal limits   URINALYSIS, MICROSCOPIC ONLY - Abnormal; Notable for the following components:    RBC, UA 0-2 (*)     WBC, UA 6-12 (*)     Squamous Epithelial Cells, UA 13-20 (*)     All other components within normal limits   COVID-19,YA [facility] IN-HOUSE,NASAL SWAB NO TRANSPORT MEDIA 2 HR TAT - Normal    Narrative:     Fact sheet for providers: https://www.fda.gov/media/479205/download     Fact sheet for patients: https://www.[employer].gov/media/259322/download    Test performed by PCR.    Consider negative results in combination with clinical observations, patient history, and epidemiological information.   CK - Normal   LACTIC ACID, PLASMA - Normal   PROCALCITONIN - Normal    Narrative:     As a Marker for Sepsis (Non-Neonates):     1. <0.5 ng/mL represents a low risk of severe sepsis and/or septic shock.  2. >2 ng/mL represents a high risk of severe sepsis and/or septic shock.    As a Marker for Lower Respiratory Tract Infections that require antibiotic therapy:  PCT on Admission     Antibiotic Therapy             6-12 Hrs later  >0.5                          Strongly Recommended            >0.25 - <0.5             Recommended  0.1 - 0.25                  Discouraged                       Remeasure/reassess PCT  <0.1                         Strongly Discouraged         Remeasure/reassess PCT      As 28 day mortality risk marker: \"Change in Procalcitonin Result\" (>80% or <=80%) if Day 0 (or Day 1) and Day 4 values are available. Refer to " http://www.[facility]-pct-calculator.com/    Change in PCT <=80 %   A decrease of PCT levels below or equal to 80% defines a positive change in PCT test result representing a higher risk for 28-day all-cause mortality of patients diagnosed with severe sepsis or septic shock.    Change in PCT >80 %   A decrease of PCT levels of more than 80% defines a negative change in PCT result representing a lower risk for 28-day all-cause mortality of patients diagnosed with severe sepsis or septic shock.               COVID PRE-OP / PRE-PROCEDURE SCREENING ORDER (NO ISOLATION)    Narrative:     The following orders were created for panel order COVID PRE-OP / PRE-PROCEDURE SCREENING ORDER (NO ISOLATION) - Swab, Nasal Cavity.  Procedure                               Abnormality         Status                     ---------                               -----------         ------                     COVID-19,[facility]-FLAK[facility]...[[id number]]  Normal              Final result                 Please view results for these tests on the individual orders.   BLOOD CULTURE   BLOOD CULTURE   URINE CULTURE   BLOOD GAS, VENOUS   CBC AND DIFFERENTIAL    Narrative:     The following orders were created for panel order CBC & Differential.  Procedure                               Abnormality         Status                     ---------                               -----------         ------                     CBC Auto Differential[[id number]]        Abnormal            Final result                 Please view results for these tests on the individual orders.              Patient ID: Tawny Shin  MRN: [id number]                                      Acct:  [id number]                      Patient's PCP: Davon Urrutia MD     Admit Date:             3/6/2021      Discharge Date:    3/22/21     Admitting Physician: Beni Urrutia MD     Discharge Physician: DANIELA Hill      Active Discharge Diagnoses:  1. MSSA  bacteremia  2. Torn gluteus muscle s/p repair  3. Post-operative MSSA wound infection  4. Cervical spinal stenosis s/p corpectomy and decompression  5. RA  6. Chronic immunosuppression  7. Multifactorial anemia  8. Hypothyroidism  9. GERD  10. Hx CAD  11. Herpes simplex on chronic suppressive therapy  12. HTN  13. Right psoas abscess  14. L1-L2 discitis osteomyelitis  15. Constipation     Hospital Problems    * No active hospital problems. *     Medical History        Past Medical History:   Diagnosis Date   • Arthritis     • Asthma     • Chronic sinusitis     • Coronary artery disease     • Eustachian tube dysfunction     • Heart disease     • Herpes simplex     • Hyperlipidemia     • Hypertension     • Knee dislocation     • Labral tear of right hip joint     • Laryngitis sicca     • Laryngitis, chronic     • MI (myocardial infarction) (CMS/HCC)     • Otorrhea     • Sensorineural hearing loss     • Sjogren's disease (CMS/HCC)              The patient was seen and examined on the day of discharge and this discharge summary is in conjunction with any daily progress note from day of discharge.     Code Status:    There are no questions and answers to display.         Hospital Course: Tawny Shin is a  65 y.o.  female who presents with neck pain s/p cervical decompression. The patient had been in her usual state of health when she developed a torn right gluteal muscle. She underwent surgical repair on 1/14/21and post-operative course was progressing. At her 4 week follow up, she developed increased bloody drainage from the wound with erythema. She presented for injection due to RA at which time she suffered a syncopal event. This was felt to be a vasovagal response and she returned to home. She was found later by a family member in the floor for an unknown length of time and the dressing to the right hip/gluteal wound was saturated.  She presented to the hospital and was noted to be febrile on intake.  Workup revealed an elevated CPK and d dimer. Wound cultures were obtained. The patient was admitted to the service of the hospitalists at that time. She underwent pacemaker interrogation in workup for her syncopal episodes which detected no abnormalities. MRI brain negative for acute process. Purulent drainage from the wound was cultured and the patient was started on broad spectrum antibiotics. She was seen in consultation by orthopedics. Blood cultures returned positive for MSSA in all bottles. ID was consulted for antibiotic management at that time. She underwent debridement of the wound per ortho on 2/9. Post-operatively, the wound measures 20 cm x 6 cm and wound vac therapy was initiated. Surveillance cultures remained positive and surgical wound cultures positive for e. Coli. She continued with antibiotic therapy with Azactam under direction of ID. Due to persistent bacteremia, she underwent ESTEFANI which did not reveal any valvular vegetations. Due to her need for continued IV antibiotic therapy, wound care and rehabilitation, she transferred to the [facility]. She was seen in consultation by our wound care team and was followed by ID. She developed increased complaints of back pain and imaging revealed a right psoas abscess as well as possible L1-2 osteomyelitis discitis. Inflammatory markers were also elevated. The patient had also complained of increased weakness and incoordination to bilateral upper extremities. Neurosurgery was consulted and recommended cervical decompression as well as continued antibiotic therapy with plans for extension of lumbar fusion. She transferred to [facility] on 3/3 and underwent cervical corpectomy and decompression. She tolerated the procedure without difficulty and returned to our facility.   The patient was initially progressing well with therapy upon her return, but she developed increased right hip pain impeding further progress. Multiple regimen adjustments were undertaken without  relief. She continued on antibiotic treatment under the direction of ID and continued wound care under the direction of our wound care team. She developed increased edema to BLE requiring gentle diuresis. Orthopedic surgery was consulted to evaluate the hip who felt it was more likely related to the persistent psoas abscess noted on imaging. Originally, the patient was scheduled for repeat drainage of the abscess on 3/19, but she was going to require sedation and had eaten breakfast. The procedure was then rescheduled for today. Arrangements had been made for possible transfer to [facility] prior to her declining progress with therapy. Neurosurgery is now planning L1-L2 discectomy given ongoing discitis osteomyelitis in that region that has thus far not responded to antibiotic therapy. Following I and D of psoas abscess, the patient will discharge from our facility for admission to [facility] for planned neurosurgical procedure.                                   MDM    Final diagnoses:   Osteomyelitis of right femur, unspecified type (CMS/HCC)   Altered mental status, unspecified altered mental status type       ED Disposition  ED Disposition     ED Disposition Condition Comment    Decision to Admit  Level of Care: Med/Surg [1]   Diagnosis: Osteomyelitis of right femur, unspecified type (CMS/HCC) [[id number]]   Admitting Physician: GARCIA DEL CID [[id number]]   Attending Physician: GARCIA DEL CID [[id number]]   Isolate for COVID?: No [0]   Certification: I Certify That Inpatient Hospital Services Are Medically Necessary For Greater Than 2 Midnights            No follow-up provider specified.       Medication List      No changes were made to your prescriptions during this visit.          Shaheed Shaw MD  07/06/21 0116       Shaheed Shaw MD  07/06/21 [id number]

## 2021-07-07 NOTE — ED NOTES
R wound hip covered with wet to dry dressing and covered with non-adherent dressing     Amalia Monsivais, SARAH  07/06/21 7527

## 2021-07-07 NOTE — PLAN OF CARE
Goal Outcome Evaluation:  Plan of Care Reviewed With: patient        Progress: no change  Outcome Summary: OT eval completed.  Pt alert and oriented x4 but mildly confused and slightly impulsive at times.  SBA-CGA to come to EOB, requires increased time and use of bed rails.  Pt requires modA for LB dressing d/t mild balance impairment/impulsivity.  Pt stood with CGA from bed and commode.  Ambulates with CGA and rw.  She would benefit from skilled OT to increase her balance, strength, safety and overall independence with ADL.  Recommend discharge to SNF vs home with HH.

## 2021-07-07 NOTE — THERAPY EVALUATION
Acute Care - Occupational Therapy Initial Evaluation  [facility]     Patient Name: Tawny Shin  : 1955  MRN: [id number]  Today's Date: 2021  Onset of Illness/Injury or Date of Surgery: 21  Date of Referral to OT: 21  Referring Physician: Dr. Hawley    Admit Date: 2021       ICD-10-CM ICD-9-CM   1. Osteomyelitis of right femur, unspecified type (CMS/[facility])  M86.9 730.25   2. Altered mental status, unspecified altered mental status type  R41.82 780.97   3. Decreased activities of daily living (ADL)  Z78.9 V49.89     Patient Active Problem List   Diagnosis   • Eustachian tube dysfunction   • Sensorineural hearing loss   • Spondylolisthesis of lumbar region   • Coronary artery disease   • Hyperlipidemia   • Hypertension   • Myalgia due to statin   • S/P coronary artery stent placement   • Pacemaker   • Seropositive rheumatoid arthritis of multiple sites (CMS/[facility])   • Sick sinus syndrome (CMS/[facility])   • Age-related osteoporosis with current pathological fracture   • Allergic-infective asthma   • Arthritis of both knees   • Vasovagal syncope   • Bilateral bunions   • Bronchitis   • Cardiac pacemaker syndrome   • Charcot's joint of foot, left   • Constipation   • 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   • Intrinsic asthma   • Left carotid bruit   • Neutropenia (CMS/[facility])   • Primary osteoarthritis of left knee   • Psoriasis vulgaris   • Recurrent acute serous otitis media of both ears   • Thrombocytopenia (CMS/[facility])   • Hypothyroidism   • Vitamin D deficiency   • Myxedema heart disease   • Syncope, cardiogenic   • Rupture of gluteus minimus tendon   • Muscle tear   • Syncope, recurrent   • Leukocytosis   • Headache   • Elevated CPK   • Staphylococcus aureus bacteremia   • Right hip pain, suspected infection   • Obesity (BMI 30-39.9)   • Stenosis of cervical spine with myelopathy (CMS/HCC)   • Spinal stenosis, lumbar region, with neurogenic claudication   • Osteomyelitis of lumbar spine (CMS/[facility])    • Iliopsoas abscess (CMS/HCC)   • Closed wedge compression fracture of T12 vertebra (CMS/HCC)   • Concussion with no loss of consciousness   • Fall as cause of accidental injury at home as place of occurrence   • Chronic pain syndrome   • Hypokalemia   • Bilateral lower extremity edema   • Chronic deep vein thrombosis (DVT) of right lower extremity (CMS/HCC)   • Closed fracture of ankle   • Closed fracture of lateral malleolus   • Closed fracture of shaft of metatarsal bone   • Osteomyelitis of right femur (CMS/HCC)   • Transient alteration of awareness   • Non-healing surgical wound, right hip     Past Medical History:   Diagnosis Date   • Arthritis    • Asthma    • Chronic sinusitis    • Coronary artery disease    • Eustachian tube dysfunction    • Heart disease    • Herpes simplex    • Hyperlipidemia    • Hypertension    • Knee dislocation    • Labral tear of right hip joint    • Laryngitis sicca    • Laryngitis, chronic    • MI (myocardial infarction) (CMS/HCC)    • Otorrhea    • Sensorineural hearing loss    • Sjogren's disease (CMS/HCC)      Past Surgical History:   Procedure Laterality Date   • A-V CARDIAC PACEMAKER INSERTION  2016   • ATRIAL CARDIAC PACEMAKER INSERTION     • CARDIAC CATHETERIZATION     • CATARACT EXTRACTION     • CERVICAL CORPECTOMY N/A 3/3/2021    Procedure: CERVICAL 6 CORPECTOMY WITH TITANIUM CAGE WITH NEURO MONITORING;  Surgeon: Bandar Shea MD;  Location:  [facility] OR;  Service: Neurosurgery;  Laterality: N/A;   • COLONOSCOPY  11/08/2011    One fold in the ascending colon which showed ulcer otherwise normal exam   • COLONOSCOPY  11/12/2004    Normal exam repeat in five years   • CORONARY ANGIOPLASTY WITH STENT PLACEMENT      X 2; 2013 & 2014   • ENDOSCOPY  07/10/2014    Normal exam   • HIP ABDUCTION TENOTOMY BILATERAL Right 1/14/2021    Procedure: RIGHT HIP GLUTEUS MEDLUS / MINIMUS REPAIR, POSSIBLE ACHILLES ALLOGRAFT;  Surgeon: Nino Carlson MD;  Location:  [facility] OR;  Service:  Orthopedics;  Laterality: Right;   • INCISION AND DRAINAGE HIP Right 2/9/2021    Procedure: HIP INCISION AND DRAINAGE;  Surgeon: Nino Carlson MD;  Location: [facility] OR;  Service: Orthopedics;  Laterality: Right;   • JOINT REPLACEMENT     • LUMBAR DISCECTOMY Right 3/23/2021    Procedure: LUMBAR DISCECTOMY MICRO, Lumbar 1/2 right;  Surgeon: Bandar Shea MD;  Location:  [facility] OR;  Service: Neurosurgery;  Laterality: Right;   • LUMBAR FUSION N/A 1/19/2018    Procedure: L3-4,L4-5 DECOMPRESSION, POSTERIOR SPINAL FUSION WITH INSTRUMENTATION;  Surgeon: Fortino Oropeza MD;  Location:  [facility] OR;  Service:    • LUMBAR LAMINECTOMY WITH FUSION Left 1/17/2018    Procedure: LEFT L3-4 L4-5 LATERAL LUMBAR INTERBODY FUSION;  Surgeon: Fortino Oropeza MD;  Location: [facility] OR;  Service:    • MYRINGOTOMY W/ TUBES  09/04/2014    TUBES NO LONGER IN PLACE   • OTHER SURGICAL HISTORY      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 so hardware was removed and spacers were placed   • REPLACEMENT TOTAL KNEE Right             OT ASSESSMENT FLOWSHEET (last 12 hours)      OT Evaluation and Treatment     Row Name 07/07/21 1356                   OT Time and Intention    Subjective Information  no complaints  -        Document Type  evaluation  -AC        Mode of Treatment  occupational therapy  -           General Information    Patient Profile Reviewed  yes  -AC        Onset of Illness/Injury or Date of Surgery  07/06/21  -        Referring Physician  Dr. Hawley  -        General Observations of Patient  lying in fowlers in bed, dressing to R hip no longer intact, RN notified  -        Prior Level of Function  independent:;all household mobility;community mobility;gait;transfer;bed mobility;ADL's  -AC        Equipment Currently Used at Home  walker, rolling;shower chair;grab bar reacher  -        Pertinent History of Current Functional Problem  fall at home with history of chronic R hip wound and osteomyelitis of R femur  -AC         Existing Precautions/Restrictions  fall  -AC        Barriers to Rehab  cognitive status  -AC           Living Environment    Current Living Arrangements  home/apartment/condo  -        Lives With  alone  -           Cognition    Orientation Status (Cognition)  oriented x 4  -AC        Follows Commands (Cognition)  follows one-step commands;75-90% accuracy  -        Personal Safety Interventions  elopement precautions initiated;fall prevention program maintained;gait belt;muscle strengthening facilitated;nonskid shoes/slippers when out of bed;supervised activity  -           Range of Motion Comprehensive    General Range of Motion  bilateral upper extremity ROM WFL  -           Strength Comprehensive (MMT)    Comment, General Manual Muscle Testing (MMT) Assessment  4+/5 BUE  -           Bed Mobility    Bed Mobility  scooting/bridging;supine-sit  -        Scooting/Bridging [place] (Bed Mobility)  standby assist;verbal cues  -        Supine-Sit [place] (Bed Mobility)  standby assist;verbal cues  -        Assistive Device (Bed Mobility)  bed rails;head of bed elevated  -           Functional Mobility    Functional Mobility- Ind. Level  contact guard assist  -        Functional Mobility- Device  rolling walker  -        Functional Mobility- Comment  to BR, to chair  -           Transfer Assessment/Treatment    Transfers  sit-stand transfer;stand-sit transfer;toilet transfer  -           Transfers    Sit-Stand [place] (Transfers)  contact guard  -        Stand-Sit [place] (Transfers)  contact guard;verbal cues  -        [place] Level (Toilet Transfer)  contact guard;verbal cues  -        Assistive Device (Toilet Transfer)  commode;grab bars/safety frame  -           Sit-Stand Transfer    Assistive Device (Sit-Stand Transfers)  walker, front-wheeled  -           Stand-Sit Transfer    Assistive Device (Stand-Sit Transfers)  walker, front-wheeled  -            Toilet Transfer    Type (Toilet Transfer)  sit-stand;stand-sit  -           Safety Issues, Functional Mobility    Impairments Affecting Function (Mobility)  balance;cognition;endurance/activity tolerance;strength  -AC        Cognitive Impairments, Mobility Safety/Performance  impulsivity;judgment  -           Balance    Balance Assessment  sitting static balance;sitting dynamic balance;standing static balance;standing dynamic balance  -        Static Sitting Balance  WFL;unsupported  -        Dynamic Sitting Balance  mild impairment;supported EOB and on commode  -        Static Standing Balance  mild impairment;supported  -AC        Dynamic Standing Balance  mild impairment;supported  -           Activities of Daily Living    BADL Assessment/Intervention  lower body dressing;toileting  -           Lower Body Dressing Assessment/Training    [place] Level (Lower Body Dressing)  don;socks;moderate assist (50% patient effort)  -        Position (Lower Body Dressing)  edge of bed sitting  -           Toileting Assessment/Training    [place] Level (Toileting)  toileting skills;standby assist;adjust/manage clothing;moderate assist (50% patient effort)  -        Assistive Devices (Toileting)  commode  -        Position (Toileting)  supported sitting;supported standing  -           BADL Safety/Performance    Impairments, BADL Safety/Performance  balance;cognition;endurance/activity tolerance;strength  -AC        Cognitive Impairments, BADL Safety/Performance  impulsivity;judgment  -           Wound 02/09/21 1715 Right hip Incision    Wound - Properties Group Placement Date: 02/09/21  - Placement Time: 1715 -SH Present on Hospital Admission: Y  -SH Side: Right  -SH Location: hip  -SH Primary Wound Type: Incision  -SH    Retired Wound - Properties Group Date first assessed: 02/09/21  - Time first assessed: 1715 -SH Present on Hospital Admission: Y  -SH Side: Right  -SH Location:  hip  -SH Primary Wound Type: Incision  -SH       OT Goals    Transfer Goal Selection (OT)  transfer, OT goal 1  -AC        Bathing Goal Selection (OT)  bathing, OT goal 1  -AC        Dressing Goal Selection (OT)  dressing, OT goal 1  -AC        Toileting Goal Selection (OT)  --  -AC           Transfer Goal 1 (OT)    Activity/Assistive Device (Transfer Goal 1, OT)  toilet;walk-in shower;shower chair  -AC        [place] Level/Cues Needed (Transfer Goal 1, OT)  standby assist  -AC        Time Frame (Transfer Goal 1, OT)  long term goal (LTG);10 days  -AC        Progress/Outcome (Transfer Goal 1, OT)  goal ongoing  -AC           Bathing Goal 1 (OT)    Activity/Device (Bathing Goal 1, OT)  bathing skills, all;shower chair  -AC        [place] Level/Cues Needed (Bathing Goal 1, OT)  supervision required  -AC        Time Frame (Bathing Goal 1, OT)  long term goal (LTG);10 days  -AC        Progress/Outcomes (Bathing Goal 1, OT)  goal ongoing  -AC           Dressing Goal 1 (OT)    Activity/Device (Dressing Goal 1, OT)  lower body dressing  -AC        [place]/Cues Needed (Dressing Goal 1, OT)  standby assist  -AC        Time Frame (Dressing Goal 1, OT)  long term goal (LTG);10 days  -AC        Progress/Outcome (Dressing Goal 1, OT)  goal ongoing  -AC           Positioning and Restraints    Pre-Treatment Position  in bed  -AC        Post Treatment Position  chair  -AC        In Chair  sitting;call light within reach;encouraged to call for assist;notified nsg;exit alarm on  -AC           Therapy Assessment/Plan (OT)    Date of Referral to OT  07/07/21  -AC        OT Diagnosis  decreased adl  -AC        Rehab Potential (OT)  good, to achieve stated therapy goals  -AC        Criteria for Skilled Therapeutic Interventions Met (OT)  yes;meets criteria;skilled treatment is necessary  -AC        Therapy Frequency (OT)  5 times/wk  -AC        Predicted Duration of Therapy Intervention (OT)  10 days  -AC        Planned  Therapy Interventions (OT)  activity tolerance training;BADL retraining;functional balance retraining;occupation/activity based interventions;patient/caregiver education/training;strengthening exercise;transfer/mobility retraining  -          User Key  (r) = Recorded By, (t) = Taken By, (c) = Cosigned By    Initials Name Effective Dates     Sebastián Howe, OTR/L, MARI 04/09/19 -     Laurie Ritter RN 01/02/19 - 06/15/21         Occupational Therapy Education                 Title: PT OT SLP Therapies (Done)     Topic: Occupational Therapy (Done)     Point: ADL training (Done)     Description:   Instruct learner(s) on proper safety adaptation and remediation techniques during self care or transfers.   Instruct in proper use of assistive devices.              Learning Progress Summary           Patient Acceptance, E, VU,NR by  at 7/7/2021 1442                   Point: Home exercise program (Done)     Description:   Instruct learner(s) on appropriate technique for monitoring, assisting and/or progressing therapeutic exercises/activities.              Learning Progress Summary           Patient Acceptance, E, VU,NR by  at 7/7/2021 1442                   Point: Body mechanics (Done)     Description:   Instruct learner(s) on proper positioning and spine alignment during self-care, functional mobility activities and/or exercises.              Learning Progress Summary           Patient Acceptance, E, VU,NR by  at 7/7/2021 1442                               User Key     Initials Effective Dates Name Provider Type Discipline     04/09/19 -  Sebastián Howe, OTR/L, MARI Occupational Therapist OT                  OT Recommendation and Plan  Planned Therapy Interventions (OT): activity tolerance training, BADL retraining, functional balance retraining, occupation/activity based interventions, patient/caregiver education/training, strengthening exercise, transfer/mobility retraining  Therapy Frequency (OT): 5  times/wk  Plan of Care Review  Plan of Care Reviewed With: patient  Progress: no change  Outcome Summary: OT eval completed.  Pt alert and oriented x4 but mildly confused and slightly impulsive at times.  SBA-CGA to come to EOB, requires increased time and use of bed rails.  Pt requires modA for LB dressing d/t mild balance impairment/impulsivity.  Pt stood with CGA from bed and commode.  Ambulates with CGA and rw.  She would benefit from skilled OT to increase her balance, strength, safety and overall independence with ADL.  Recommend discharge to SNF vs home with HH.  Plan of Care Reviewed With: patient  Outcome Summary: OT eval completed.  Pt alert and oriented x4 but mildly confused and slightly impulsive at times.  SBA-CGA to come to EOB, requires increased time and use of bed rails.  Pt requires modA for LB dressing d/t mild balance impairment/impulsivity.  Pt stood with CGA from bed and commode.  Ambulates with CGA and rw.  She would benefit from skilled OT to increase her balance, strength, safety and overall independence with ADL.  Recommend discharge to SNF vs home with HH.    Outcome Measures     Row Name 07/07/21 2952             How much help from another is currently needed...    Putting on and taking off regular lower body clothing?  2  -AC      Bathing (including washing, rinsing, and drying)  2  -AC      Toileting (which includes using toilet bed pan or urinal)  2  -AC      Putting on and taking off regular upper body clothing  4  -AC      Taking care of personal grooming (such as brushing teeth)  4  -AC      Eating meals  4  -AC      AM-PAC 6 Clicks Score (OT)  18  -AC         Functional Assessment    Outcome Measure Options  AM-PAC 6 Clicks Daily Activity (OT)  -AC        User Key  (r) = Recorded By, (t) = Taken By, (c) = Cosigned By    Initials Name Provider Type    Sebastián Catherine, OTR/L, CNT Occupational Therapist          Time Calculation:   Time Calculation- OT     Row Name 07/07/21 9103              Time Calculation- OT    OT Start Time  1356  -AC      OT Stop Time  1435  -AC      OT Time Calculation (min)  39 min  -      OT Received On  07/07/21  -      OT Goal Re-Cert Due Date  07/17/21  -        User Key  (r) = Recorded By, (t) = Taken By, (c) = Cosigned By    Initials Name Provider Type     Sebastián Howe, OTR/L, CNT Occupational Therapist        Therapy Charges for Today     Code Description Service Date Service Provider Modifiers Qty    [id number]  OT EVAL LOW COMPLEXITY 3 7/7/2021 Sebastián Howe, OTR/L, MARI GO 1               Sebastián Howe OTR/L, MARI  7/7/2021

## 2021-07-07 NOTE — PROGRESS NOTES
1           [facility] Medicine Services  INPATIENT PROGRESS NOTE    Patient Name: Tawny Shin  Date of Admission: 7/6/2021  Today's Date: 07/07/21  Length of Stay: 1  Primary Care Physician: Davon Urrutia MD    Subjective   Chief Complaint: Follow-up  HPI   She is 65-year-old woman known to me from a recent hospitalization dated June 22 to June 23 of this year.  She was admitted at that time for concussion without loss of consciousness after a fall.  She has been diagnosed as well during that hospitalization with deep vein thrombosis.  I discharged her with Eliquis.  She got readmitted yesterday for altered mental status reportedly of 4 days duration.  She had another bout of fall.  She has a chronic deep wound on right hip which they did an x-ray.  Findings was concerning for chronic or treated osteomyelitis.  Admitting record indicates that she received Azactam and vancomycin in the emergency room.  This are currently on hold pending evaluation by Ortho per recommendation.    By the time she was seen by LILLY Alejandro, she has been assisting already in her history.  Review of diagnostic studies showed  elevated CRP 12.1.  This is now improved to 9.48  .  This is now improved to 54  White count is normal 5.1 with predominance of neutrophils at 52.  There is also presence of monocytes at 14 and lymphocytes of 24  She has normocytic anemia  Head CT scan reported to have no acute intracranial findings, chronic microvascular changes, volume loss and vascular calcification  She also been found with paranasal sinus mucosal thickening and small left mastoid effusion.  Her venous blood gas showed PCO2 of 55 but otherwise normal pH.  She has small amount of leukocyte esterase with pyuria however also has presence of squamous epithelial cells of 13-20.  This was a clean-catch urine sample.  Procalcitonin was normal at 0.07, lactic acid is 1.8, CK is normal at 35.    Yesterday, I just  touch base with Dr. Shin about her mom.  In our discussion mom reportedly mad because she is not allowed as of yet to get to her new home as family felt she is not ready to be by herself.    Patient states she stays with her sister.  Yesterday she went to the back room and as she was standing off the commode with the help of her walker, her leg gave out on her resulting to fall.      I have not seen that she received a bolus of fluid in the emergency room.  Documented IV fluid was only 100 mL.  Hemoglobin actually dropped.    Review of Systems   Patient denies any loss of consciousness  Denies any palpitation, shortness of breath or difficulty breathing    All pertinent negatives and positives are as above. All other systems have been reviewed and are negative unless otherwise stated.     Objective    Temp:  [98.1 °F (36.7 °C)-99 °F (37.2 °C)] 98.4 °F (36.9 °C)  Heart Rate:  [80-85] 80  Resp:  [16-18] 16  BP: (117-146)/(56-84) 146/57  Physical Exam  She is neurologically intact.  She is able to relate her story which appears make sense and correlates with a telephone conversation I have reviewed.  She has no gross limitation in her strength of all extremities  She is interactive and appropriate in affect  Pupils equal reactive to light  Anicteric sclera, glassy appearance of left pupil  Supple neck  No thyromegaly  Moist oral mucosa  Lungs are clear to auscultation with good air exchange  S1-S2, regular rate and rhythm  Soft abdomen, nontender, no organomegaly  No cyanosis  Positive pitting edema bilateral lower extremities with pain and tenderness  Big wound in right hip with packing.  No gross foul odor.  No gross discharge.  Red-pink to red wound bed without any gross signs of necrosis    Results Review:  I have reviewed the labs, radiology results, and diagnostic studies.    Laboratory Data:   Results from last 7 days   Lab Units 07/07/21  0550 07/06/21  1946   WBC 10*3/mm3 4.23 5.16   HEMOGLOBIN g/dL 8.5*  9.7*   HEMATOCRIT % 27.0* 31.5*   PLATELETS 10*3/mm3 238 297        Results from last 7 days   Lab Units 07/07/21  0550 07/06/21  1946   SODIUM mmol/L 141 139   POTASSIUM mmol/L 3.6 3.6   CHLORIDE mmol/L 103 100   CO2 mmol/L 31.0* 29.0   BUN mg/dL 10 11   CREATININE mg/dL 0.77 0.90   CALCIUM mg/dL 8.6 9.2   BILIRUBIN mg/dL 0.2  --    ALK PHOS U/L 99  --    ALT (SGPT) U/L 6  --    AST (SGOT) U/L 16  --    GLUCOSE mg/dL 88 136*       Culture Data:   No results found for: BLOODCX, URINECX, WOUNDCX, MRSACX, RESPCX, STOOLCX    Radiology Data:   Imaging Results (Last 24 Hours)     Procedure Component Value Units Date/Time    CT Head Without Contrast [[id number]] Collected: 07/06/21 2015     Updated: 07/06/21 2021    Narrative:      EXAM: CT HEAD WO CONTRAST- - 7/6/2021 8:06 PM CDT     HISTORY: Altered mental status       COMPARISON: 6/22/2021.      DOSE LENGTH PRODUCT: 724 mGy cm. Automated exposure control was also  utilized to decrease patient radiation dose.     TECHNIQUE: Unenhanced CT images obtained from vertex to skull base with  multiplanar reformats.     FINDINGS:  No acute intracranial hemorrhage, midline shift, mass effect or large  territory cortical infarct. Ventricles, basilar cisterns and sulci have  proportional prominence suggesting volume loss. Periventricular and  subcortical white matter hypodensities, most likely due to chronic  microvascular disease. Vascular calcifications.     Thinning of the bilateral ocular lenses, which may be age-related or  postoperative. Paranasal sinus mucosal thickening. Small left mastoid  effusion. No right mastoid effusion. External auditory canals patent.  Calvarium appears intact. Subcutaneous tissues within normal limits.          Impression:      1. No acute intracranial findings.   2. Chronic microvascular changes, volume loss and vascular  calcifications.  3. Paranasal sinus mucosal thickening and small left mastoid effusion.  This report was finalized on 07/06/2021  20:18 by Dr Paulina Tavares MD.    XR Pelvis 1 or 2 View [[id number]] Collected: 07/06/21 2002     Updated: 07/06/21 2010    Narrative:      EXAM: XR PELVIS 1 OR 2 VW- - 7/6/2021 7:59 PM CDT     HISTORY: recent infected hip, open wound       COMPARISON: 5/26/2021.      TECHNIQUE:  1 images.  AP view of the pelvis     FINDINGS:    Pelvis appears intact. Sacroiliac joints symmetric with degenerative  changes. Sacral arcuate lines limited due to technique and overlying  bowel gas. Partially visualized lumbar fixation hardware. Pubic  symphysis anatomic.     Lucency at the right femur, greater trochanter. There appears to be  interval progression of lucency compared to 5/26/2021 CT. Persistent  overlying soft tissue defect raises concern for chronic or treated  osteomyelitis.     Degenerative changes of the bilateral hip. Gluteal granuloma projecting  lateral to the left iliac wing.          Impression:      1. There appears to be interval progression of lucency at the right  femur greater trochanter compared to CT 5/26/2021. Persistent overlying  soft tissue defect raises concern for chronic or treated osteomyelitis.  This report was finalized on 07/06/2021 20:06 by Dr Paulina Tavares MD.    XR Chest 1 View [[id number]] Collected: 07/06/21 2001     Updated: 07/06/21 2005    Narrative:      EXAM: XR CHEST 1 VW- - 7/6/2021 7:59 PM CDT     HISTORY: Altered mental status       COMPARISON: 6/22/2021.      TECHNIQUE:  1 images.  Frontal view of the chest.     FINDINGS:    Cardiac pulse generator at the left chest with 2 grossly intact leads.  No pneumothorax, pleural effusion or focal consolidation. Right upper  lung granuloma. Cardiac mediastinal silhouette appear within normal  limits. Corpectomy and fixation hardware at the cervical spine. No acute  bony finding.          Impression:      1. No acute cardiopulmonary findings.  This report was finalized on 07/06/2021 20:02 by Dr Paulina Tavares MD.          I have reviewed  the patient's current medications.     Assessment/Plan     Active Hospital Problems    Diagnosis    • **Osteomyelitis of right femur (CMS/HCC)    • Transient alteration of awareness    • Non-healing surgical wound, right hip    • Chronic deep vein thrombosis (DVT) of right lower extremity (CMS/HCC)    • Chronic pain syndrome          Problem list  Chronic wound right hip with concern for chronic/(?)Treated osteomyelitis-normally has a wound VAC, home health following.  Recent history of DVT (June 2021) with prior history of it.  Last intake of Eliquis was Monday.  From review of outpatient telephone conversation with clinic, patient requires prior authorization.  Also noted information about history of fall and plan to reassess in 3 months in reference to anticoagulation.  I have the sense from the record read that she is prone to have recurrent DVT.  Fall, recurrent -because of accidental injury at home  Chronic pain syndrome  Bilateral lower extremity edema  Hypertension      Discussion/plan  As planned, orthopedic will be consulted for further recommendation.  Will check if okay with them to place back on wound VAC if felt infectious process is less likely or if felt warranted to be treated with antibiotic.    On my exam, the wound appears chronic and similar to prior exam in last hospitalization dated June 22.  It is covered by wet-to-dry dressing.  There is no obvious necrotic tissues or significant inflammatory changes surrounding the wound.    PT OT evaluation and assist and education to catch fall and lessen risk    apixaban, 5 mg, Oral, Q12H  carvedilol, 25 mg, Oral, BID With Meals  fluticasone, 1 spray, Nasal, Daily  furosemide, 40 mg, Oral, Daily  gabapentin, 300 mg, Oral, Nightly  isosorbide mononitrate, 60 mg, Oral, BID  levothyroxine, 75 mcg, Oral, Q AM  lidocaine, 1 patch, Transdermal, Nightly  magnesium oxide, 400 mg, Oral, Daily  multivitamin with minerals, 1 tablet, Oral, Daily  naproxen, 375 mg,  Oral, BID With Meals  pantoprazole, 40 mg, Oral, Daily  potassium chloride, 20 mEq, Oral, Daily  saccharomyces boulardii, 250 mg, Oral, Daily  sodium chloride, 10 mL, Intravenous, Q12H  sucralfate, 1 g, Oral, 4x Daily AC & at Bedtime  traMADol, 50 mg, Oral, TID  valACYclovir, 500 mg, Oral, Q24H        Discussed with PT and nurse Hernandez.  I also mentioned to nurse Hernandez to relay with Ortho regarding discussion by [facility]ameka if feasible or felt warranted to use a brace on right lower extremity.  See outpatient telephone conversation for details.    Discharge Planning: Follow-up    Electronically signed by Jun Hawley MD, 07/07/21, 08:27 CDT.

## 2021-07-07 NOTE — THERAPY EVALUATION
Patient Name: Tawny Shin  : 1955    MRN: [id number]                              Today's Date: 2021       Admit Date: 2021    Visit Dx:     ICD-10-CM ICD-9-CM   1. Osteomyelitis of right femur, unspecified type (CMS/HCC)  M86.9 730.25   2. Altered mental status, unspecified altered mental status type  R41.82 780.97   3. Decreased activities of daily living (ADL)  Z78.9 V49.89   4. Decreased functional activity tolerance  R68.89 780.99     Patient Active Problem List   Diagnosis   • Eustachian tube dysfunction   • Sensorineural hearing loss   • Spondylolisthesis of lumbar region   • Coronary artery disease   • Hyperlipidemia   • Hypertension   • Myalgia due to statin   • S/P coronary artery stent placement   • Pacemaker   • Seropositive rheumatoid arthritis of multiple sites (CMS/HCC)   • Sick sinus syndrome (CMS/HCC)   • Age-related osteoporosis with current pathological fracture   • Allergic-infective asthma   • Arthritis of both knees   • Vasovagal syncope   • Bilateral bunions   • Bronchitis   • Cardiac pacemaker syndrome   • Charcot's joint of foot, left   • Constipation   • 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   • Intrinsic asthma   • Left carotid bruit   • Neutropenia (CMS/HCC)   • Primary osteoarthritis of left knee   • Psoriasis vulgaris   • Recurrent acute serous otitis media of both ears   • Thrombocytopenia (CMS/HCC)   • Hypothyroidism   • Vitamin D deficiency   • Myxedema heart disease   • Syncope, cardiogenic   • Rupture of gluteus minimus tendon   • Muscle tear   • Syncope, recurrent   • Leukocytosis   • Headache   • Elevated CPK   • Staphylococcus aureus bacteremia   • Right hip pain, suspected infection   • Obesity (BMI 30-39.9)   • Stenosis of cervical spine with myelopathy (CMS/HCC)   • Spinal stenosis, lumbar region, with neurogenic claudication   • Osteomyelitis of lumbar spine (CMS/HCC)   • Iliopsoas abscess (CMS/HCC)   • Closed wedge compression fracture of T12 vertebra  (CMS/[facility])   • Concussion with no loss of consciousness   • Fall as cause of accidental injury at home as place of occurrence   • Chronic pain syndrome   • Hypokalemia   • Bilateral lower extremity edema   • Chronic deep vein thrombosis (DVT) of right lower extremity (CMS/[facility])   • Closed fracture of ankle   • Closed fracture of lateral malleolus   • Closed fracture of shaft of metatarsal bone   • Osteomyelitis of right femur (CMS/[facility])   • Transient alteration of awareness   • Non-healing surgical wound, right hip     Past Medical History:   Diagnosis Date   • Arthritis    • Asthma    • Chronic sinusitis    • Coronary artery disease    • Eustachian tube dysfunction    • Heart disease    • Herpes simplex    • Hyperlipidemia    • Hypertension    • Knee dislocation    • Labral tear of right hip joint    • Laryngitis sicca    • Laryngitis, chronic    • MI (myocardial infarction) (CMS/[facility])    • Otorrhea    • Sensorineural hearing loss    • Sjogren's disease (CMS/[facility])      Past Surgical History:   Procedure Laterality Date   • A-V CARDIAC PACEMAKER INSERTION  2016   • ATRIAL CARDIAC PACEMAKER INSERTION     • CARDIAC CATHETERIZATION     • CATARACT EXTRACTION     • CERVICAL CORPECTOMY N/A 3/3/2021    Procedure: CERVICAL 6 CORPECTOMY WITH TITANIUM CAGE WITH NEURO MONITORING;  Surgeon: Bandar Shea MD;  Location:  [facility] OR;  Service: Neurosurgery;  Laterality: N/A;   • COLONOSCOPY  11/08/2011    One fold in the ascending colon which showed ulcer otherwise normal exam   • COLONOSCOPY  11/12/2004    Normal exam repeat in five years   • CORONARY ANGIOPLASTY WITH STENT PLACEMENT      X 2; 2013 & 2014   • ENDOSCOPY  07/10/2014    Normal exam   • HIP ABDUCTION TENOTOMY BILATERAL Right 1/14/2021    Procedure: RIGHT HIP GLUTEUS MEDLUS / MINIMUS REPAIR, POSSIBLE ACHILLES ALLOGRAFT;  Surgeon: Nino Carlson MD;  Location:  [facility] OR;  Service: Orthopedics;  Laterality: Right;   • INCISION AND DRAINAGE HIP Right 2/9/2021     Procedure: HIP INCISION AND DRAINAGE;  Surgeon: Nino Carlson MD;  Location: [facility] OR;  Service: Orthopedics;  Laterality: Right;   • JOINT REPLACEMENT     • LUMBAR DISCECTOMY Right 3/23/2021    Procedure: LUMBAR DISCECTOMY MICRO, Lumbar 1/2 right;  Surgeon: Bandar Shea MD;  Location: [facility] OR;  Service: Neurosurgery;  Laterality: Right;   • LUMBAR FUSION N/A 1/19/2018    Procedure: L3-4,L4-5 DECOMPRESSION, POSTERIOR SPINAL FUSION WITH INSTRUMENTATION;  Surgeon: Fortino Oropeza MD;  Location:  [facility] OR;  Service:    • LUMBAR LAMINECTOMY WITH FUSION Left 1/17/2018    Procedure: LEFT L3-4 L4-5 LATERAL LUMBAR INTERBODY FUSION;  Surgeon: Fortino Oropeza MD;  Location: [facility] OR;  Service:    • MYRINGOTOMY W/ TUBES  09/04/2014    TUBES NO LONGER IN PLACE   • OTHER SURGICAL HISTORY      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 so hardware was removed and spacers were placed   • REPLACEMENT TOTAL KNEE Right      General Information     Row Name 07/07/21 [facility]0          Physical Therapy Time and Intention    Document Type  evaluation AMS, multiple falls at home, possible osteomyelitis of R femur  -MS     Mode of Treatment  physical therapy;co-treatment  -MS     Row Name 07/07/21 1350          General Information    Patient Profile Reviewed  yes  -MS     Existing Precautions/Restrictions  fall  -MS     Barriers to Rehab  cognitive status;physical barrier  -MS     Row Name 07/07/21 1350          Cognition    Orientation Status (Cognition)  oriented x 4 confused during conversation  -MS     Row Name 07/07/21 1350          Safety Issues, Functional Mobility    Impairments Affecting Function (Mobility)  balance;cognition;endurance/activity tolerance;strength  -MS     Cognitive Impairments, Mobility Safety/Performance  impulsivity;judgment  -MS       User Key  (r) = Recorded By, (t) = Taken By, (c) = Cosigned By    Initials Name Provider Type    MS Nicole Olson R, PT, DPT, NCS Physical Therapist        Mobility     Row  Name 07/07/21 1350          Bed Mobility    Bed Mobility  scooting/bridging;supine-sit  -MS     Scooting/Bridging [place] (Bed Mobility)  standby assist;verbal cues  -MS     Supine-Sit [place] (Bed Mobility)  standby assist;verbal cues  -MS     Assistive Device (Bed Mobility)  bed rails;head of bed elevated  -MS     Row Name 07/07/21 1350          Sit-Stand Transfer    Sit-Stand [place] (Transfers)  contact guard  -MS     Assistive Device (Sit-Stand Transfers)  walker, front-wheeled  -MS     Row Name 07/07/21 [facility]0          Gait/Stairs (Locomotion)    [place] Level (Gait)  contact guard;verbal cues  -MS     Assistive Device (Gait)  walker, front-wheeled  -MS     Distance in Feet (Gait)  20ft x2 with step to gait pattern leading with the R, pt is forward flexed at the hips and leans to the R  -MS       User Key  (r) = Recorded By, (t) = Taken By, (c) = Cosigned By    Initials Name Provider Type    Nicole Palacios, PT, DPT, NCS Physical Therapist        Obj/Interventions     Row Name 07/07/21 [facility]0          Range of Motion Comprehensive    Comment, General Range of Motion  R hip flex and abduction impaired 25%  -MS     Row Name 07/07/21 [facility]0          Strength Comprehensive (MMT)    Comment, General Manual Muscle Testing (MMT) Assessment  R hip grossly 3-/5, R quad 3+/5  -MS     Row Name 07/07/21 [facility]0          Balance    Static Sitting Balance  WFL;unsupported  -MS     Dynamic Sitting Balance  mild impairment;supported  -MS     Static Standing Balance  mild impairment;supported  -MS     Dynamic Standing Balance  mild impairment;supported  -MS     Row Name 07/07/21 [facility]0          Sensory Assessment (Somatosensory)    Sensory Assessment (Somatosensory)  sensation intact  -MS       User Key  (r) = Recorded By, (t) = Taken By, (c) = Cosigned By    Initials Name Provider Type    Nicole Palacios, PT, DPT, NCS Physical Therapist        Goals/Plan     Row Name 07/07/21 [facility]0          Bed Mobility Goal 1  (PT)    Activity/Assistive Device (Bed Mobility Goal 1, PT)  bed mobility activities, all  -MS     [place] Level/Cues Needed (Bed Mobility Goal 1, PT)  independent  -MS     Time Frame (Bed Mobility Goal 1, PT)  long term goal (LTG);by discharge  -MS     Progress/Outcomes (Bed Mobility Goal 1, PT)  goal ongoing  -MS     Row Name 07/07/21 1350          Transfer Goal 1 (PT)    Activity/Assistive Device (Transfer Goal 1, PT)  sit-to-stand/stand-to-sit;bed-to-chair/chair-to-bed  -MS     [place] Level/Cues Needed (Transfer Goal 1, PT)  modified independence  -MS     Time Frame (Transfer Goal 1, PT)  long term goal (LTG);by discharge  -MS     Progress/Outcome (Transfer Goal 1, PT)  goal ongoing  -MS     Row Name 07/07/21 1350          Gait Training Goal 1 (PT)    Activity/Assistive Device (Gait Training Goal 1, PT)  gait (walking locomotion);assistive device use;decrease fall risk;increase endurance/gait distance;improve balance and speed;walker, rolling  -MS     [place] Level (Gait Training Goal 1, PT)  modified independence  -MS     Distance (Gait Training Goal 1, PT)  100ft  -MS     Time Frame (Gait Training Goal 1, PT)  long term goal (LTG);by discharge  -MS     Progress/Outcome (Gait Training Goal 1, PT)  goal ongoing  -MS       User Key  (r) = Recorded By, (t) = Taken By, (c) = Cosigned By    Initials Name Provider Type    MS Nicole Olson R, PT, DPT, NCS Physical Therapist        Clinical Impression     Row Name 07/07/21 1305          Pain    Additional Documentation  Pain Scale: FACES Pre/Post-Treatment (Group)  -MS     Row Name 07/07/21 1305          Pain Scale: FACES Pre/Post-Treatment    Pain: FACES Scale, Pretreatment  2-->hurts little bit  -MS     Posttreatment Pain Rating  2-->hurts little bit  -MS     Pain Location - Side  Right  -MS     Pain Location  knee  -MS     Row Name 07/07/21 1305          Plan of Care Review    Plan of Care Reviewed With  patient  -MS     Progress  no change  -MS      Outcome Summary  PT evaluation completed. The patient presents alert and oriented x4 however she is confused at times during conversation. She still has a large open wound on her R hip that was treated with a wound vac. Ortho would like to leave wet to dry dressings for now and possibly start wound vac dressings at a later date. The patient demonstrates R LE weakness that is chronic. She is able to walk short distances to the restroom, but fatigues with this. She will benefit from skilled PT services to work on endurance and strengthening with activity. She does demonstrates some confusion at times and is not safe to be home alone. Recommend placement at an Assistive Living Facility if family and patient are willing. Otherwise SNF or home with 24/7 care will be needed.  -MS     Row Name 07/07/21 7741          Therapy Assessment/Plan (PT)    Patient/Family Therapy Goals Statement (PT)  care for myself  -MS     Rehab Potential (PT)  good, to achieve stated therapy goals  -MS     Criteria for Skilled Interventions Met (PT)  yes;meets criteria;skilled treatment is necessary  -MS     Predicted Duration of Therapy Intervention (PT)  until discharge  -MS     Row Name 07/07/21 1303          Positioning and Restraints    Post Treatment Position  chair  -MS     In Chair  notified nsg;sitting;call light within reach;encouraged to call for assist  -MS       User Key  (r) = Recorded By, (t) = Taken By, (c) = Cosigned By    Initials Name Provider Type    MS Nicole Olson R, PT, DPT, NCS Physical Therapist        Outcome Measures     Row Name 07/07/21 5161          How much help from another person do you currently need...    Turning from your back to your side while in flat bed without using bedrails?  3  -MS     Moving from lying on back to sitting on the side of a flat bed without bedrails?  3  -MS     Moving to and from a bed to a chair (including a wheelchair)?  3  -MS     Standing up from a chair using your arms (e.g.,  wheelchair, bedside chair)?  3  -MS     Climbing 3-5 steps with a railing?  1  -MS     To walk in hospital room?  3  -MS     AM-PAC 6 Clicks Score (PT)  16  -MS     Row Name 07/07/21 1356 07/07/21 1350       Functional Assessment    Outcome Measure Options  AM-PAC 6 Clicks Daily Activity (OT)  -AC  AM-PAC 6 Clicks Basic Mobility (PT)  -MS      User Key  (r) = Recorded By, (t) = Taken By, (c) = Cosigned By    Initials Name Provider Type    AC Sebastián Howe, OTR/L, CNT Occupational Therapist    MS Nicole Olson, PT, DPT, NCS Physical Therapist        Physical Therapy Education                 Title: PT OT SLP Therapies (In Progress)     Topic: Physical Therapy (In Progress)     Point: Mobility training (Done)     Learning Progress Summary           Patient Acceptance, E, VU by MS at 7/7/2021 1151    Comment: role of PT in her care                   Point: Home exercise program (Not Started)     Learner Progress:  Not documented in this visit.          Point: Body mechanics (Not Started)     Learner Progress:  Not documented in this visit.          Point: Precautions (Not Started)     Learner Progress:  Not documented in this visit.                      User Key     Initials Effective Dates Name Provider Type Discipline    MS 06/19/18 -  Nicole Olson, PT, DPT, NCS Physical Therapist PT              PT Recommendation and Plan  Planned Therapy Interventions (PT): balance training, bed mobility training, gait training, home exercise program, patient/family education, strengthening, transfer training  Plan of Care Reviewed With: patient  Progress: no change  Outcome Summary: PT evaluation completed. The patient presents alert and oriented x4 however she is confused at times during conversation. She still has a large open wound on her R hip that was treated with a wound vac. Ortho would like to leave wet to dry dressings for now and possibly start wound vac dressings at a later date. The patient demonstrates R LE  weakness that is chronic. She is able to walk short distances to the restroom, but fatigues with this. She will benefit from skilled PT services to work on endurance and strengthening with activity. She does demonstrates some confusion at times and is not safe to be home alone. Recommend placement at an Assistive Living Facility if family and patient are willing. Otherwise SNF or home with 24/7 care will be needed.     Time Calculation:   PT Charges     Row Name 07/07/21 1509             Time Calculation    Start Time  1350  -MS      Stop Time  1430  -MS      Time Calculation (min)  40 min  -MS      PT Received On  07/07/21  -MS      PT Goal Re-Cert Due Date  07/17/21  -MS         Untimed Charges    PT Eval/Re-eval Minutes  40  -MS         Total Minutes    Untimed Charges Total Minutes  40  -MS       Total Minutes  40  -MS        User Key  (r) = Recorded By, (t) = Taken By, (c) = Cosigned By    Initials Name Provider Type    MS Nicole Olson, PT, DPT, NCS Physical Therapist        Therapy Charges for Today     Code Description Service Date Service Provider Modifiers Qty    [id number] HC PT EVAL LOW COMPLEXITY 3 7/7/2021 Nicole Olson PT, DPT, NCS GP 1          PT G-Codes  Outcome Measure Options: AM-PAC 6 Clicks Daily Activity (OT)  AM-PAC 6 Clicks Score (PT): 16  AM-PAC 6 Clicks Score (OT): 18    Nicole Olson PT, DPT, NCS  7/7/2021

## 2021-07-07 NOTE — CONSULTS
Orthopaedic Surgery Consult Note      7/7/2021   13:15 CDT    Name:  Tawny Shin  MRN:    [id number]     Acct:     [id number]   Room:  [facility] Day: 1     Admit Date: 7/6/2021  PCP: Davon Urrutia MD        Subjective:     CC: Right hip chronic wound/suspected osteomyelitis    HPI: 65 y.o. female with chronic surgical wound to right hip. She is well known to our service due to previous abductor repair surgery by Dr. Carlson. She has previously been treated with wound vac and has been followed by wound care for several months. Our service consulted primarily due to concerns on xray for possible osteomyelitis of right femur. Most recently, patient was readmitted due to altered mental status and having a fall. She tells me at the time of the fall she felt her hip give out on her. She has had Azactam and vancomycin per chart review.  Her white count has remained normal, blood cultures pending, ESR elevated, CRP elevated but trending down today from yesterday.          Medications:     Allergies:   Allergies   Allergen Reactions   • Atorvastatin Other (See Comments)     LEG CRAMPS     • Amoxicillin Rash   • Escitalopram Rash   • Nabumetone Rash   • Niacin Er Rash   • Penicillins Rash   • Simvastatin Rash       Current Meds:   Current Facility-Administered Medications   Medication Dose Route Frequency Provider Last Rate Last Admin   • acetaminophen (TYLENOL) tablet 650 mg  650 mg Oral Q4H PRN Mayra Chong APRN        Or   • acetaminophen (TYLENOL) 160 MG/5ML solution 650 mg  650 mg Oral Q4H PRN Mayra Chong APRN        Or   • acetaminophen (TYLENOL) suppository 650 mg  650 mg Rectal Q4H PRN Mayra Chong APRN       • apixaban (ELIQUIS) tablet 5 mg  5 mg Oral Q12H Mayra Chong APRN   5 mg at 07/07/21 1253   • carvedilol (COREG) tablet 25 mg  25 mg Oral BID With Meals Mayra Chong APRN   25 mg at 07/07/21 1253   • fluticasone (FLONASE) 50 MCG/ACT nasal spray 1 spray  1 spray Nasal Daily  Mayra Chong, APRN   1 spray at 07/07/21 1307   • furosemide (LASIX) tablet 40 mg  40 mg Oral Daily Mayra Chong, APRN   40 mg at 07/07/21 1253   • gabapentin (NEURONTIN) capsule 300 mg  300 mg Oral Nightly Mayra Chong, APRN       • isosorbide mononitrate (IMDUR) 24 hr tablet 60 mg  60 mg Oral BID Mayra Chong, APRN   60 mg at 07/07/21 1256   • levothyroxine (SYNTHROID, LEVOTHROID) tablet 75 mcg  75 mcg Oral Q AM Mayra Chong, APRN       • lidocaine (LIDODERM) 5 % 1 patch  1 patch Transdermal Nightly Mayra Chong, APRN   1 patch at 07/07/21 0041   • magnesium oxide (MAG-OX) tablet 400 mg  400 mg Oral Daily Mayra Chong, APRN   400 mg at 07/07/21 1253   • multivitamin with minerals 1 tablet  1 tablet Oral Daily Mayra Chong, APRN   1 tablet at 07/07/21 1253   • naproxen (NAPROSYN) tablet 375 mg  375 mg Oral BID With Meals Mayra Chong, APRN   375 mg at 07/07/21 1254   • nitroglycerin (NITROSTAT) SL tablet 0.4 mg  0.4 mg Sublingual Q5 Min PRN Mayra Cohng, APRN       • ondansetron (ZOFRAN) tablet 4 mg  4 mg Oral Q6H PRN Mayra Chong, APRN        Or   • ondansetron (ZOFRAN) injection 4 mg  4 mg Intravenous Q6H PRN Mayra Chong, APRN       • pantoprazole (PROTONIX) EC tablet 40 mg  40 mg Oral Daily Mayra Chong, APRN   40 mg at 07/07/21 1300   • potassium chloride (MICRO-K) CR capsule 20 mEq  20 mEq Oral Daily Mayra Chong, APRN   20 mEq at 07/07/21 1254   • saccharomyces boulardii (FLORASTOR) capsule 250 mg  250 mg Oral Daily Mayra Chong, APRN   250 mg at 07/07/21 1256   • sodium chloride 0.9 % flush 10 mL  10 mL Intravenous Q12H Mayra Chong, APRN   10 mL at 07/07/21 0042   • sodium chloride 0.9 % flush 10 mL  10 mL Intravenous PRN Mayra Chong APRN       • sucralfate (CARAFATE) tablet 1 g  1 g Oral 4x Daily AC & at Bedtime Mayra Chong APRN   1 g at 07/07/21 1252   • traMADol (ULTRAM) tablet 50 mg  50 mg Oral TID Castillo  Mayra VALDEZ APRN   50 mg at 07/07/21 1252   • valACYclovir (VALTREX) tablet 500 mg  500 mg Oral Q24H Mayra Chong APRN   500 mg at 07/07/21 1256           Data:     Code Status:    Code Status and Medical Interventions:   Ordered at: 07/06/21 4687     Code Status:    CPR     Medical Interventions (Level of Support Prior to Arrest):    Full       Family History   Problem Relation Age of Onset   • Cancer Mother    • Heart disease Father        Social History     Socioeconomic History   • Marital status:      Spouse name: Not on file   • Number of children: Not on file   • Years of education: Not on file   • Highest education level: Not on file   Tobacco Use   • Smoking status: Never Smoker   • Smokeless tobacco: Never Used   Substance and Sexual Activity   • Alcohol use: No   • Drug use: Never   • Sexual activity: Defer       Past Medical History:   Diagnosis Date   • Arthritis    • Asthma    • Chronic sinusitis    • Coronary artery disease    • Eustachian tube dysfunction    • Heart disease    • Herpes simplex    • Hyperlipidemia    • Hypertension    • Knee dislocation    • Labral tear of right hip joint    • Laryngitis sicca    • Laryngitis, chronic    • MI (myocardial infarction) (CMS/HCC)    • Otorrhea    • Sensorineural hearing loss    • Sjogren's disease (CMS/HCC)      Past Surgical History:   Procedure Laterality Date   • A-V CARDIAC PACEMAKER INSERTION  2016   • ATRIAL CARDIAC PACEMAKER INSERTION     • CARDIAC CATHETERIZATION     • CATARACT EXTRACTION     • CERVICAL CORPECTOMY N/A 3/3/2021    Procedure: CERVICAL 6 CORPECTOMY WITH TITANIUM CAGE WITH NEURO MONITORING;  Surgeon: Bandar Shea MD;  Location: [facility];  Service: Neurosurgery;  Laterality: N/A;   • COLONOSCOPY  11/08/2011    One fold in the ascending colon which showed ulcer otherwise normal exam   • COLONOSCOPY  11/12/2004    Normal exam repeat in five years   • CORONARY ANGIOPLASTY WITH STENT PLACEMENT      X 2; 2013 & 2014    • ENDOSCOPY  07/10/2014    Normal exam   • HIP ABDUCTION TENOTOMY BILATERAL Right 1/14/2021    Procedure: RIGHT HIP GLUTEUS MEDLUS / MINIMUS REPAIR, POSSIBLE ACHILLES ALLOGRAFT;  Surgeon: Nino Carlson MD;  Location:  [facility] OR;  Service: Orthopedics;  Laterality: Right;   • INCISION AND DRAINAGE HIP Right 2/9/2021    Procedure: HIP INCISION AND DRAINAGE;  Surgeon: Nino Carlson MD;  Location:  [facility];  Service: Orthopedics;  Laterality: Right;   • JOINT REPLACEMENT     • LUMBAR DISCECTOMY Right 3/23/2021    Procedure: LUMBAR DISCECTOMY MICRO, Lumbar 1/2 right;  Surgeon: Bandar Shea MD;  Location:  [facility];  Service: Neurosurgery;  Laterality: Right;   • LUMBAR FUSION N/A 1/19/2018    Procedure: L3-4,L4-5 DECOMPRESSION, POSTERIOR SPINAL FUSION WITH INSTRUMENTATION;  Surgeon: Fortino Oropeza MD;  Location:  [facility] OR;  Service:    • LUMBAR LAMINECTOMY WITH FUSION Left 1/17/2018    Procedure: LEFT L3-4 L4-5 LATERAL LUMBAR INTERBODY FUSION;  Surgeon: Fortino Oroepza MD;  Location: [facility] OR;  Service:    • MYRINGOTOMY W/ TUBES  09/04/2014    TUBES NO LONGER IN PLACE   • OTHER SURGICAL HISTORY      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 so hardware was removed and spacers were placed   • REPLACEMENT TOTAL KNEE Right        Review of Symptoms:  CONSTITUTIONAL:  negative for  fevers, chills, sweats, fatigue, malaise, anorexia and weight loss  EYES:  negative for  double vision, blurred vision and visual disturbance  HEENT:  negative for  hearing loss, tinnitus, ear drainage, earaches, nasal congestion, epistaxis, snoring, sore mouth, sore throat, hoarseness and voice change  RESPIRATORY:  negative for  dry cough, cough with sputum, dyspnea, wheezing, hemoptysis, chest pain, pleuritic pain and cyanosis  CARDIOVASCULAR:  negative for  chest pain, palpitations, orthopnea, exertional chest pressure/discomfort, edema  GASTROINTESTINAL:  negative for nausea, vomiting, change in bowel habits, diarrhea, constipation,  "abdominal pain, jaundice, dysphagia, regurgitation, hematemesis and hemtochezia  GENITOURINARY:  negative for frequency, dysuria, nocturia, hesitancy and hematuria  INTEGUMENT/BREAST:  negative for rash, skin lesion(s), dryness, skin color change, pruritus, changes in hair and changes in nails  HEMATOLOGIC/LYMPHATIC:  negative for easy bruising, bleeding, lymphadenopathy, petechiae and swelling/edema  ALLERGIC/IMMUNOLOGIC:  negative for recurrent infections and urticaria  ENDOCRINE:  negative for heat intolerance, cold intolerance, tremor, weight changes, hair loss and diabetic symptoms including neither polyuria nor polydipsia  MUSCULOSKELETAL:  See HPI  NEUROLOGICAL:  negative for headaches, dizziness, seizures, memory problems, speech problems, visual disturbance, coordination problems, gait problems, tremor, syncope and near syncope  BEHAVIOR/PSYCH:  negative for depressed mood, elated mood, increased agitation and anxiety      Vitals:  /74 (BP Location: Left arm, Patient Position: Lying)   Pulse 78   Temp 98.9 °F (37.2 °C) (Oral)   Resp 16   Ht 167.6 cm (65.98\")   Wt 89.9 kg (198 lb 1.6 oz)   SpO2 90%   BMI 31.99 kg/m²   Temp (24hrs), Av.5 °F (36.9 °C), Min:98.1 °F (36.7 °C), Max:99 °F (37.2 °C)      I/O (24Hr):    Intake/Output Summary (Last 24 hours) at 2021 1315  Last data filed at 2021 2219  Gross per 24 hour   Intake 100 ml   Output --   Net 100 ml       Labs:  Lab Results (last 72 hours)     Procedure Component Value Units Date/Time    Comprehensive Metabolic Panel [[id number]]  (Abnormal) Collected: 21 0550    Specimen: Blood Updated: 21 0637     Glucose 88 mg/dL      BUN 10 mg/dL      Creatinine 0.77 mg/dL      Sodium 141 mmol/L      Potassium 3.6 mmol/L      Chloride 103 mmol/L      CO2 31.0 mmol/L      Calcium 8.6 mg/dL      Total Protein 6.0 g/dL      Albumin 2.40 g/dL      ALT (SGPT) 6 U/L      AST (SGOT) 16 U/L      Alkaline Phosphatase 99 U/L      Total " Bilirubin 0.2 mg/dL      eGFR Non African Amer 75 mL/min/1.73      Globulin 3.6 gm/dL      A/G Ratio 0.7 g/dL      BUN/Creatinine Ratio 13.0     Anion Gap 7.0 mmol/L     Narrative:      GFR Normal >60  Chronic Kidney Disease <60  Kidney Failure <15      C-reactive Protein [[id number]]  (Abnormal) Collected: 07/07/21 0550    Specimen: Blood Updated: 07/07/21 0637     C-Reactive Protein 9.48 mg/dL     Sedimentation Rate [[id number]]  (Abnormal) Collected: 07/07/21 0550    Specimen: Blood Updated: 07/07/21 0626     Sed Rate 54 mm/hr     CBC Auto Differential [[id number]]  (Abnormal) Collected: 07/07/21 0550    Specimen: Blood Updated: 07/07/21 0611     WBC 4.23 10*3/mm3      RBC 2.97 10*6/mm3      Hemoglobin 8.5 g/dL      Hematocrit 27.0 %      MCV 90.9 fL      MCH 28.6 pg      MCHC 31.5 g/dL      RDW 15.5 %      RDW-SD 51.8 fl      MPV 9.5 fL      Platelets 238 10*3/mm3      Neutrophil % 38.1 %      Lymphocyte % 32.6 %      Monocyte % 18.4 %      Eosinophil % 9.5 %      Basophil % 1.2 %      Immature Grans % 0.2 %      Neutrophils, Absolute 1.61 10*3/mm3      Lymphocytes, Absolute 1.38 10*3/mm3      Monocytes, Absolute 0.78 10*3/mm3      Eosinophils, Absolute 0.40 10*3/mm3      Basophils, Absolute 0.05 10*3/mm3      Immature Grans, Absolute 0.01 10*3/mm3      nRBC 0.0 /100 WBC     COVID PRE-OP / PRE-PROCEDURE SCREENING ORDER (NO ISOLATION) - Swab, Nasal Cavity [[id number]]  (Normal) Collected: 07/06/21 2052    Specimen: Swab from Nasal Cavity Updated: 07/06/21 2140    Narrative:      The following orders were created for panel order COVID PRE-OP / PRE-PROCEDURE SCREENING ORDER (NO ISOLATION) - Swab, Nasal Cavity.  Procedure                               Abnormality         Status                     ---------                               -----------         ------                     COVID-19,[facility]...[[id number]]  Normal              Final result                 Please view results for these tests on the  individual orders.    COVID-19,[facility] IN-HOUSE,Nasal Swab No Transport Media 3-4 HR TAT - Swab, Nasal Cavity [[id number]]  (Normal) Collected: 07/06/21 2052    Specimen: Swab from Nasal Cavity Updated: 07/06/21 2140     COVID19 Not Detected    Narrative:      Fact sheet for providers: https://www.fda.gov/media/113637/download     Fact sheet for patients: https://www.fda.gov/media/557545/download    Test performed by PCR.    Consider negative results in combination with clinical observations, patient history, and epidemiological information.    Blood Culture - Blood, Arm, Right [[id number]] Collected: 07/06/21 2052    Specimen: Blood from Arm, Right Updated: 07/06/21 2116    Urinalysis With Culture If Indicated - Urine, Clean Catch [[id number]]  (Abnormal) Collected: 07/06/21 2040    Specimen: Urine, Clean Catch Updated: 07/06/21 2106     Color, UA Yellow     Appearance, UA Clear     pH, UA <=5.0     Specific Gravity, UA 1.014     Glucose, UA Negative     Ketones, UA Negative     Bilirubin, UA Negative     Blood, UA Negative     Protein, UA Negative     Leuk Esterase, UA Small (1+)     Nitrite, UA Negative     Urobilinogen, UA 0.2 E.U./dL    Urinalysis, Microscopic Only - Urine, Clean Catch [[id number]]  (Abnormal) Collected: 07/06/21 2040    Specimen: Urine, Clean Catch Updated: 07/06/21 2106     RBC, UA 0-2 /HPF      WBC, UA 6-12 /HPF      Bacteria, UA None Seen /HPF      Squamous Epithelial Cells, UA 13-20 /HPF      Hyaline Casts, UA 3-6 /LPF      Methodology Automated Microscopy    Urine Culture - Urine, Urine, Clean Catch [[id number]] Collected: 07/06/21 2040    Specimen: Urine, Clean Catch Updated: 07/06/21 2106    Blood Gas, Venous - [[id number]]  (Abnormal) Collected: 07/06/21 2045    Specimen: Venous Blood Updated: 07/06/21 2052     Site OTHER     pH, Venous 7.383 pH Units      pCO2, Venous 55.1 mm Hg      Comment: 83 Value above reference range        pO2, Venous 29.3 mm Hg      HCO3, Venous 32.8 mmol/L       "Comment: 83 Value above reference range        Base Excess, Venous 6.1 mmol/L      Comment: 83 Value above reference range        O2 Saturation, Venous 44.5 %      Comment: 84 Value below reference range        Temperature 37.0 C      Barometric Pressure for Blood Gas 750 mmHg      Modality Room Air     Ventilator Mode NA     Collected by [id number]     Comment: Meter: [id number]     :  [id number]       C-reactive Protein [[id number]]  (Abnormal) Collected: 07/06/21 1946    Specimen: Blood Updated: 07/06/21 2050     C-Reactive Protein 12.16 mg/dL     Sedimentation Rate [[id number]]  (Abnormal) Collected: 07/06/21 1946    Specimen: Blood Updated: 07/06/21 2039     Sed Rate 101 mm/hr     Procalcitonin [[id number]]  (Normal) Collected: 07/06/21 1946    Specimen: Blood Updated: 07/06/21 2026     Procalcitonin 0.07 ng/mL     Narrative:      As a Marker for Sepsis (Non-Neonates):     1. <0.5 ng/mL represents a low risk of severe sepsis and/or septic shock.  2. >2 ng/mL represents a high risk of severe sepsis and/or septic shock.    As a Marker for Lower Respiratory Tract Infections that require antibiotic therapy:  PCT on Admission     Antibiotic Therapy             6-12 Hrs later  >0.5                          Strongly Recommended            >0.25 - <0.5             Recommended  0.1 - 0.25                  Discouraged                       Remeasure/reassess PCT  <0.1                         Strongly Discouraged         Remeasure/reassess PCT      As 28 day mortality risk marker: \"Change in Procalcitonin Result\" (>80% or <=80%) if Day 0 (or Day 1) and Day 4 values are available. Refer to http://www.[employer]-pct-calculator.com/    Change in PCT <=80 %   A decrease of PCT levels below or equal to 80% defines a positive change in PCT test result representing a higher risk for 28-day all-cause mortality of patients diagnosed with severe sepsis or septic shock.    Change in PCT >80 %   A decrease of PCT levels of more than " 80% defines a negative change in PCT result representing a lower risk for 28-day all-cause mortality of patients diagnosed with severe sepsis or septic shock.                Basic Metabolic Panel [[id number]]  (Abnormal) Collected: 07/06/21 1946    Specimen: Blood Updated: 07/06/21 2022     Glucose 136 mg/dL      BUN 11 mg/dL      Creatinine 0.90 mg/dL      Sodium 139 mmol/L      Potassium 3.6 mmol/L      Chloride 100 mmol/L      CO2 29.0 mmol/L      Calcium 9.2 mg/dL      eGFR Non African Amer 62 mL/min/1.73      BUN/Creatinine Ratio 12.2     Anion Gap 10.0 mmol/L     Narrative:      GFR Normal >60  Chronic Kidney Disease <60  Kidney Failure <15      CK [[id number]]  (Normal) Collected: 07/06/21 1946    Specimen: Blood Updated: 07/06/21 2022     Creatine Kinase 35 U/L     Lactic Acid, Plasma [[id number]]  (Normal) Collected: 07/06/21 1946    Specimen: Blood Updated: 07/06/21 2011     Lactate 1.8 mmol/L     aPTT [[id number]]  (Abnormal) Collected: 07/06/21 1946    Specimen: Blood Updated: 07/06/21 2006     PTT 47.8 seconds     Protime-INR [[id number]]  (Abnormal) Collected: 07/06/21 1946    Specimen: Blood Updated: 07/06/21 2006     Protime 17.4 Seconds      INR 1.55    CBC & Differential [[id number]]  (Abnormal) Collected: 07/06/21 1946    Specimen: Blood Updated: 07/06/21 1959    Narrative:      The following orders were created for panel order CBC & Differential.  Procedure                               Abnormality         Status                     ---------                               -----------         ------                     CBC Auto Differential[[id number]]        Abnormal            Final result                 Please view results for these tests on the individual orders.    CBC Auto Differential [[id number]]  (Abnormal) Collected: 07/06/21 1946    Specimen: Blood Updated: 07/06/21 1959     WBC 5.16 10*3/mm3      RBC 3.43 10*6/mm3      Hemoglobin 9.7 g/dL      Hematocrit 31.5 %      MCV 91.8 fL      MCH 28.3  pg      MCHC 30.8 g/dL      RDW 15.4 %      RDW-SD 51.9 fl      MPV 9.4 fL      Platelets 297 10*3/mm3      Neutrophil % 52.6 %      Lymphocyte % 23.6 %      Monocyte % 14.5 %      Eosinophil % 8.3 %      Basophil % 0.8 %      Immature Grans % 0.2 %      Neutrophils, Absolute 2.71 10*3/mm3      Lymphocytes, Absolute 1.22 10*3/mm3      Monocytes, Absolute 0.75 10*3/mm3      Eosinophils, Absolute 0.43 10*3/mm3      Basophils, Absolute 0.04 10*3/mm3      Immature Grans, Absolute 0.01 10*3/mm3      nRBC 0.0 /100 WBC     Blood Culture - Blood, Arm, Right [[id number]] Collected: 07/06/21 1946    Specimen: Blood from Arm, Right Updated: 07/06/21 1956          Imaging:  CT Head Without Contrast    Result Date: 7/6/2021  1. No acute intracranial findings. 2. Chronic microvascular changes, volume loss and vascular calcifications. 3. Paranasal sinus mucosal thickening and small left mastoid effusion. This report was finalized on 07/06/2021 20:18 by Dr Paulina Tavares MD.    XR Chest 1 View    Result Date: 7/6/2021  1. No acute cardiopulmonary findings. This report was finalized on 07/06/2021 20:02 by Dr Paulina Tavares MD.    XR Pelvis 1 or 2 View    Result Date: 7/6/2021  1. There appears to be interval progression of lucency at the right femur greater trochanter compared to CT 5/26/2021. Persistent overlying soft tissue defect raises concern for chronic or treated osteomyelitis. This report was finalized on 07/06/2021 20:06 by Dr Paulina Tavares MD.          Physical Exam:     General: Pleasant mood and appropriate affect. Well nourished.  HEENT: NC/AT, BANDAR, EOMI, Nares patent bilaterally with no epistaxis noted.  Neck: Soft throughout with no obvious masses.  Chest: +2 distal pulses throughout, no heaves or lifts seen.  Respiratory: Equal rise and fall bilaterally, no retractions or rhonchi or wheezes noted.  Abdomen: Obese and non tender.  Skin: Pink and dry with appropriate turgor.  Neuro: CN II-XII grossly intact, no  focal deficits noted.  Right Hip: chronic wound to right hip, no surrounding erythema. Similar in appearance to images in chart. Mild active serous drainage. Hip exam with mild internal/external rotation pain. Good passive and active rom to the rest of the right lower extremity. NVI.   Left Hip: FROM passive and active, strength 5/5. NVI.       Assessment:     Primary Orthopaedic Problem  Osteomyelitis of right femur (CMS/HCC)  Chronic surgical wound right hip    Plan:     · No plans for surgery right now - spoke with Dr. Huntley who is on-call physician  · MRI right hip  · Await recommendation of infectious disease regarding antibiotics - patient has been on long term iv abx recently transitioned to oral abx  · Continue wet-to-dry dressing changes twice daily for now, may plan to place wound vac on later  · DVT ppx per Hospitalist          Electronically signed by Silverio Parekh PA-C on 7/7/2021 at 13:15 CDT

## 2021-07-08 ENCOUNTER — ANESTHESIA (OUTPATIENT)
Dept: PERIOP | Facility: HOSPITAL | Age: 66
End: 2021-07-08

## 2021-07-08 ENCOUNTER — ANESTHESIA EVENT (OUTPATIENT)
Dept: PERIOP | Facility: HOSPITAL | Age: 66
End: 2021-07-08

## 2021-07-08 LAB — BACTERIA SPEC AEROBE CULT: NORMAL

## 2021-07-08 PROCEDURE — 25010000002 PROPOFOL 10 MG/ML EMULSION: Performed by: NURSE ANESTHETIST, CERTIFIED REGISTERED

## 2021-07-08 PROCEDURE — 97116 GAIT TRAINING THERAPY: CPT

## 2021-07-08 PROCEDURE — 25010000002 FENTANYL CITRATE (PF) 100 MCG/2ML SOLUTION: Performed by: NURSE ANESTHETIST, CERTIFIED REGISTERED

## 2021-07-08 PROCEDURE — 87176 TISSUE HOMOGENIZATION CULTR: CPT | Performed by: ORTHOPAEDIC SURGERY

## 2021-07-08 PROCEDURE — 0KBN0ZZ EXCISION OF RIGHT HIP MUSCLE, OPEN APPROACH: ICD-10-PCS | Performed by: ORTHOPAEDIC SURGERY

## 2021-07-08 PROCEDURE — 87077 CULTURE AEROBIC IDENTIFY: CPT | Performed by: ORTHOPAEDIC SURGERY

## 2021-07-08 PROCEDURE — 87205 SMEAR GRAM STAIN: CPT | Performed by: ORTHOPAEDIC SURGERY

## 2021-07-08 PROCEDURE — 87070 CULTURE OTHR SPECIMN AEROBIC: CPT | Performed by: ORTHOPAEDIC SURGERY

## 2021-07-08 PROCEDURE — 0QB63ZX EXCISION OF RIGHT UPPER FEMUR, PERCUTANEOUS APPROACH, DIAGNOSTIC: ICD-10-PCS | Performed by: ORTHOPAEDIC SURGERY

## 2021-07-08 PROCEDURE — 87075 CULTR BACTERIA EXCEPT BLOOD: CPT | Performed by: ORTHOPAEDIC SURGERY

## 2021-07-08 PROCEDURE — 87186 SC STD MICRODIL/AGAR DIL: CPT | Performed by: ORTHOPAEDIC SURGERY

## 2021-07-08 PROCEDURE — 25010000002 ONDANSETRON PER 1 MG: Performed by: NURSE ANESTHETIST, CERTIFIED REGISTERED

## 2021-07-08 RX ORDER — ROCURONIUM BROMIDE 10 MG/ML
INJECTION, SOLUTION INTRAVENOUS AS NEEDED
Status: DISCONTINUED | OUTPATIENT
Start: 2021-07-08 | End: 2021-07-08 | Stop reason: SURG

## 2021-07-08 RX ORDER — MAGNESIUM HYDROXIDE 1200 MG/15ML
LIQUID ORAL AS NEEDED
Status: DISCONTINUED | OUTPATIENT
Start: 2021-07-08 | End: 2021-07-08 | Stop reason: HOSPADM

## 2021-07-08 RX ORDER — SODIUM CHLORIDE 0.9 % (FLUSH) 0.9 %
3 SYRINGE (ML) INJECTION EVERY 12 HOURS SCHEDULED
Status: DISCONTINUED | OUTPATIENT
Start: 2021-07-08 | End: 2021-07-08 | Stop reason: HOSPADM

## 2021-07-08 RX ORDER — PROPOFOL 10 MG/ML
VIAL (ML) INTRAVENOUS AS NEEDED
Status: DISCONTINUED | OUTPATIENT
Start: 2021-07-08 | End: 2021-07-08 | Stop reason: SURG

## 2021-07-08 RX ORDER — NALOXONE HCL 0.4 MG/ML
0.4 VIAL (ML) INJECTION
Status: DISCONTINUED | OUTPATIENT
Start: 2021-07-08 | End: 2021-07-13 | Stop reason: HOSPADM

## 2021-07-08 RX ORDER — SODIUM CHLORIDE, SODIUM LACTATE, POTASSIUM CHLORIDE, CALCIUM CHLORIDE 600; 310; 30; 20 MG/100ML; MG/100ML; MG/100ML; MG/100ML
100 INJECTION, SOLUTION INTRAVENOUS CONTINUOUS
Status: DISCONTINUED | OUTPATIENT
Start: 2021-07-08 | End: 2021-07-08 | Stop reason: SDUPTHER

## 2021-07-08 RX ORDER — ONDANSETRON 4 MG/1
4 TABLET, FILM COATED ORAL EVERY 6 HOURS PRN
Status: DISCONTINUED | OUTPATIENT
Start: 2021-07-08 | End: 2021-07-13 | Stop reason: HOSPADM

## 2021-07-08 RX ORDER — ONDANSETRON 2 MG/ML
INJECTION INTRAMUSCULAR; INTRAVENOUS AS NEEDED
Status: DISCONTINUED | OUTPATIENT
Start: 2021-07-08 | End: 2021-07-08 | Stop reason: SURG

## 2021-07-08 RX ORDER — HYDROMORPHONE HYDROCHLORIDE 1 MG/ML
0.5 INJECTION, SOLUTION INTRAMUSCULAR; INTRAVENOUS; SUBCUTANEOUS
Status: DISCONTINUED | OUTPATIENT
Start: 2021-07-08 | End: 2021-07-08 | Stop reason: HOSPADM

## 2021-07-08 RX ORDER — ONDANSETRON 2 MG/ML
4 INJECTION INTRAMUSCULAR; INTRAVENOUS EVERY 6 HOURS PRN
Status: DISCONTINUED | OUTPATIENT
Start: 2021-07-08 | End: 2021-07-13 | Stop reason: HOSPADM

## 2021-07-08 RX ORDER — NALOXONE HCL 0.4 MG/ML
0.1 VIAL (ML) INJECTION
Status: DISCONTINUED | OUTPATIENT
Start: 2021-07-08 | End: 2021-07-13 | Stop reason: HOSPADM

## 2021-07-08 RX ORDER — FENTANYL CITRATE 50 UG/ML
INJECTION, SOLUTION INTRAMUSCULAR; INTRAVENOUS AS NEEDED
Status: DISCONTINUED | OUTPATIENT
Start: 2021-07-08 | End: 2021-07-08 | Stop reason: SURG

## 2021-07-08 RX ORDER — HYDROCODONE BITARTRATE AND ACETAMINOPHEN 7.5; 325 MG/1; MG/1
2 TABLET ORAL EVERY 4 HOURS PRN
Status: DISCONTINUED | OUTPATIENT
Start: 2021-07-08 | End: 2021-07-13 | Stop reason: HOSPADM

## 2021-07-08 RX ORDER — HYDROCODONE BITARTRATE AND ACETAMINOPHEN 7.5; 325 MG/1; MG/1
1 TABLET ORAL EVERY 4 HOURS PRN
Status: DISCONTINUED | OUTPATIENT
Start: 2021-07-08 | End: 2021-07-13 | Stop reason: HOSPADM

## 2021-07-08 RX ORDER — DOCUSATE SODIUM 100 MG/1
100 CAPSULE, LIQUID FILLED ORAL 2 TIMES DAILY PRN
Status: DISCONTINUED | OUTPATIENT
Start: 2021-07-08 | End: 2021-07-09

## 2021-07-08 RX ORDER — NEOSTIGMINE METHYLSULFATE 5 MG/5 ML
SYRINGE (ML) INTRAVENOUS AS NEEDED
Status: DISCONTINUED | OUTPATIENT
Start: 2021-07-08 | End: 2021-07-08 | Stop reason: SURG

## 2021-07-08 RX ORDER — OXYCODONE AND ACETAMINOPHEN 10; 325 MG/1; MG/1
1 TABLET ORAL ONCE AS NEEDED
Status: DISCONTINUED | OUTPATIENT
Start: 2021-07-08 | End: 2021-07-08 | Stop reason: HOSPADM

## 2021-07-08 RX ORDER — SODIUM CHLORIDE 0.9 % (FLUSH) 0.9 %
3 SYRINGE (ML) INJECTION EVERY 12 HOURS SCHEDULED
Status: DISCONTINUED | OUTPATIENT
Start: 2021-07-08 | End: 2021-07-13 | Stop reason: HOSPADM

## 2021-07-08 RX ORDER — PROMETHAZINE HYDROCHLORIDE 12.5 MG/1
12.5 SUPPOSITORY RECTAL EVERY 6 HOURS PRN
Status: DISCONTINUED | OUTPATIENT
Start: 2021-07-08 | End: 2021-07-13 | Stop reason: HOSPADM

## 2021-07-08 RX ORDER — ONDANSETRON 2 MG/ML
4 INJECTION INTRAMUSCULAR; INTRAVENOUS AS NEEDED
Status: DISCONTINUED | OUTPATIENT
Start: 2021-07-08 | End: 2021-07-08 | Stop reason: HOSPADM

## 2021-07-08 RX ORDER — NALOXONE HCL 0.4 MG/ML
0.04 VIAL (ML) INJECTION AS NEEDED
Status: DISCONTINUED | OUTPATIENT
Start: 2021-07-08 | End: 2021-07-08 | Stop reason: HOSPADM

## 2021-07-08 RX ORDER — ACETAMINOPHEN 650 MG/1
650 SUPPOSITORY RECTAL EVERY 4 HOURS PRN
Status: DISCONTINUED | OUTPATIENT
Start: 2021-07-08 | End: 2021-07-13 | Stop reason: HOSPADM

## 2021-07-08 RX ORDER — ACETAMINOPHEN 325 MG/1
650 TABLET ORAL EVERY 4 HOURS PRN
Status: DISCONTINUED | OUTPATIENT
Start: 2021-07-08 | End: 2021-07-13 | Stop reason: HOSPADM

## 2021-07-08 RX ORDER — PROMETHAZINE HYDROCHLORIDE 25 MG/1
12.5 TABLET ORAL EVERY 6 HOURS PRN
Status: DISCONTINUED | OUTPATIENT
Start: 2021-07-08 | End: 2021-07-13 | Stop reason: HOSPADM

## 2021-07-08 RX ORDER — SODIUM CHLORIDE, SODIUM LACTATE, POTASSIUM CHLORIDE, CALCIUM CHLORIDE 600; 310; 30; 20 MG/100ML; MG/100ML; MG/100ML; MG/100ML
100 INJECTION, SOLUTION INTRAVENOUS CONTINUOUS
Status: DISCONTINUED | OUTPATIENT
Start: 2021-07-08 | End: 2021-07-09

## 2021-07-08 RX ORDER — FLUMAZENIL 0.1 MG/ML
0.2 INJECTION INTRAVENOUS AS NEEDED
Status: DISCONTINUED | OUTPATIENT
Start: 2021-07-08 | End: 2021-07-08 | Stop reason: HOSPADM

## 2021-07-08 RX ORDER — SODIUM CHLORIDE 0.9 % (FLUSH) 0.9 %
3-10 SYRINGE (ML) INJECTION AS NEEDED
Status: DISCONTINUED | OUTPATIENT
Start: 2021-07-08 | End: 2021-07-08 | Stop reason: HOSPADM

## 2021-07-08 RX ORDER — SODIUM CHLORIDE 0.9 % (FLUSH) 0.9 %
1-10 SYRINGE (ML) INJECTION AS NEEDED
Status: DISCONTINUED | OUTPATIENT
Start: 2021-07-08 | End: 2021-07-13 | Stop reason: HOSPADM

## 2021-07-08 RX ORDER — LABETALOL HYDROCHLORIDE 5 MG/ML
5 INJECTION, SOLUTION INTRAVENOUS
Status: DISCONTINUED | OUTPATIENT
Start: 2021-07-08 | End: 2021-07-08 | Stop reason: HOSPADM

## 2021-07-08 RX ORDER — FENTANYL CITRATE 50 UG/ML
25 INJECTION, SOLUTION INTRAMUSCULAR; INTRAVENOUS
Status: DISCONTINUED | OUTPATIENT
Start: 2021-07-08 | End: 2021-07-08 | Stop reason: HOSPADM

## 2021-07-08 RX ORDER — LIDOCAINE HYDROCHLORIDE 10 MG/ML
0.5 INJECTION, SOLUTION EPIDURAL; INFILTRATION; INTRACAUDAL; PERINEURAL ONCE AS NEEDED
Status: DISCONTINUED | OUTPATIENT
Start: 2021-07-08 | End: 2021-07-08 | Stop reason: HOSPADM

## 2021-07-08 RX ORDER — LIDOCAINE HYDROCHLORIDE 20 MG/ML
INJECTION, SOLUTION EPIDURAL; INFILTRATION; INTRACAUDAL; PERINEURAL AS NEEDED
Status: DISCONTINUED | OUTPATIENT
Start: 2021-07-08 | End: 2021-07-08 | Stop reason: SURG

## 2021-07-08 RX ORDER — HYDROMORPHONE HYDROCHLORIDE 1 MG/ML
0.5 INJECTION, SOLUTION INTRAMUSCULAR; INTRAVENOUS; SUBCUTANEOUS
Status: DISCONTINUED | OUTPATIENT
Start: 2021-07-08 | End: 2021-07-13 | Stop reason: HOSPADM

## 2021-07-08 RX ADMIN — FLUTICASONE PROPIONATE 1 SPRAY: 50 SPRAY, METERED NASAL at 09:56

## 2021-07-08 RX ADMIN — Medication 400 MG: at 09:55

## 2021-07-08 RX ADMIN — PANTOPRAZOLE SODIUM 40 MG: 40 TABLET, DELAYED RELEASE ORAL at 09:55

## 2021-07-08 RX ADMIN — NAPROXEN 375 MG: 375 TABLET ORAL at 09:53

## 2021-07-08 RX ADMIN — SUCRALFATE 1 G: 1 TABLET ORAL at 09:54

## 2021-07-08 RX ADMIN — Medication 250 MG: at 09:54

## 2021-07-08 RX ADMIN — LEVOTHYROXINE SODIUM 75 MCG: 75 TABLET ORAL at 09:55

## 2021-07-08 RX ADMIN — LIDOCAINE HYDROCHLORIDE 50 MG: 20 INJECTION, SOLUTION EPIDURAL; INFILTRATION; INTRACAUDAL; PERINEURAL at 18:33

## 2021-07-08 RX ADMIN — SODIUM CHLORIDE, POTASSIUM CHLORIDE, SODIUM LACTATE AND CALCIUM CHLORIDE 100 ML/HR: 600; 310; 30; 20 INJECTION, SOLUTION INTRAVENOUS at 17:12

## 2021-07-08 RX ADMIN — ISOSORBIDE MONONITRATE 60 MG: 60 TABLET, EXTENDED RELEASE ORAL at 09:54

## 2021-07-08 RX ADMIN — GLYCOPYRROLATE 0.3 MG: 0.2 INJECTION, SOLUTION INTRAMUSCULAR; INTRAVENOUS at 19:05

## 2021-07-08 RX ADMIN — LIDOCAINE 1 PATCH: 50 PATCH CUTANEOUS at 22:00

## 2021-07-08 RX ADMIN — ONDANSETRON 4 MG: 2 INJECTION INTRAMUSCULAR; INTRAVENOUS at 19:07

## 2021-07-08 RX ADMIN — TRAMADOL HYDROCHLORIDE 50 MG: 50 TABLET, FILM COATED ORAL at 09:55

## 2021-07-08 RX ADMIN — ROCURONIUM BROMIDE 30 MG: 10 INJECTION INTRAVENOUS at 18:33

## 2021-07-08 RX ADMIN — SUCRALFATE 1 G: 1 TABLET ORAL at 22:00

## 2021-07-08 RX ADMIN — Medication 3 MG: at 19:05

## 2021-07-08 RX ADMIN — PROPOFOL 150 MG: 10 INJECTION, EMULSION INTRAVENOUS at 18:33

## 2021-07-08 RX ADMIN — FENTANYL CITRATE 100 MCG: 50 INJECTION, SOLUTION INTRAMUSCULAR; INTRAVENOUS at 18:33

## 2021-07-08 RX ADMIN — CARVEDILOL 25 MG: 25 TABLET, FILM COATED ORAL at 09:55

## 2021-07-08 RX ADMIN — VALACYCLOVIR 500 MG: 500 TABLET, FILM COATED ORAL at 09:54

## 2021-07-08 RX ADMIN — FUROSEMIDE 40 MG: 40 TABLET ORAL at 09:55

## 2021-07-08 RX ADMIN — Medication 1 TABLET: at 09:54

## 2021-07-08 RX ADMIN — POTASSIUM CHLORIDE 20 MEQ: 750 CAPSULE, EXTENDED RELEASE ORAL at 09:53

## 2021-07-08 RX ADMIN — GABAPENTIN 300 MG: 300 CAPSULE ORAL at 22:00

## 2021-07-08 RX ADMIN — HYDROCODONE BITARTRATE AND ACETAMINOPHEN 1 TABLET: 7.5; 325 TABLET ORAL at 22:28

## 2021-07-08 RX ADMIN — SODIUM CHLORIDE, POTASSIUM CHLORIDE, SODIUM LACTATE AND CALCIUM CHLORIDE 100 ML/HR: 600; 310; 30; 20 INJECTION, SOLUTION INTRAVENOUS at 23:27

## 2021-07-08 RX ADMIN — TRAMADOL HYDROCHLORIDE 50 MG: 50 TABLET, FILM COATED ORAL at 21:59

## 2021-07-08 RX ADMIN — ACETAMINOPHEN 650 MG: 325 TABLET, FILM COATED ORAL at 04:47

## 2021-07-08 RX ADMIN — SUCRALFATE 1 G: 1 TABLET ORAL at 06:36

## 2021-07-08 RX ADMIN — CARVEDILOL 25 MG: 25 TABLET, FILM COATED ORAL at 22:00

## 2021-07-08 RX ADMIN — LEVOTHYROXINE SODIUM 75 MCG: 75 TABLET ORAL at 06:36

## 2021-07-08 NOTE — ANESTHESIA PREPROCEDURE EVALUATION
Anesthesia Evaluation     Patient summary reviewed and Nursing notes reviewed   no history of anesthetic complications:               Airway   Mallampati: I  TM distance: >3 FB  Neck ROM: full  No difficulty expected  Dental - normal exam     Pulmonary    (+) asthma,  Cardiovascular   Exercise tolerance: poor (<4 METS)    PT is on anticoagulation therapy  Patient on routine beta blocker    (+) pacemaker ([employer]tronic ) pacemaker, hypertension well controlled 2 medications or greater, past MI  >12 months, CAD, cardiac stents (2 stents, most recent 6 years ago) more than 12 months ago dysrhythmias (SSS), DVT (Acute--There is evidence of deep venous thrombosis and superficial 6/23) current, hyperlipidemia,     ROS comment: medtronic interrogation report reviewed from 2/9/21  15% a paced    Aspirin/plavix held 1 week ago,  Last dose xarelto > 4 days    Echo 2/10/21  · Estimated left ventricular EF = 65% Left ventricular systolic function is normal.  · Normal right ventricular cavity size and systolic function noted.  · All valves appear structurally and functionally normal with no evidence of any hemodynamically significant disease.  · There is no evidence of right to left shunt on bubble study.  · There is no evidence of intracardiac mass or thrombus.       ESTEFANI done to eval for valve vegetations which showed none    Neuro/Psych  (+) headaches, syncope, numbness,     GI/Hepatic/Renal/Endo    (+) obesity,   thyroid problem hypothyroidism  (-) liver disease, no renal disease, diabetes    Musculoskeletal     (+) gait problem,       ROS comment: Right hip/leg weakness, s/p glut med repair  Post op she had syncopal episodes x 2, was admitted with wound infection/sepsis  Now diagnosed with cervical stenosis s/p decompression, myelopathy, lumbar discitis  Psoas abscess drained yesterday  Abdominal    Substance History - negative use     OB/GYN          Other   arthritis (rheumatoid arthritis, prednisone 2 mg daily), blood  dyscrasia anemia,       Other Comment: Sjogren's       Phys Exam Other: c collar in place                  Anesthesia Plan    ASA 4 - emergent     general   (Chart review note: CRNA to complete risk/benefit discussion, determine NPO status, discuss possibility of blood transfusion.   Interrogate device in recovery; heavy comorbidity burden; MRI indicates osteomyelitis; )  intravenous induction       Plan discussed with CRNA.

## 2021-07-08 NOTE — THERAPY TREATMENT NOTE
Acute Care - Physical Therapy Treatment Note  [facility]     Patient Name: Tawny Shin  : 1955  MRN: [id number]  Today's Date: 2021   Onset of Illness/Injury or Date of Surgery: 21       PT Assessment (last 12 hours)      PT Evaluation and Treatment     Row Name 2131          Physical Therapy Time and Intention    Subjective Information  no complaints  -KINGS     Document Type  therapy note (daily note)  -KINGS     Mode of Treatment  physical therapy  -KINGS     Row Name 21          General Information    Existing Precautions/Restrictions  fall  -     Row Name 21          Pain Scale: FACES Pre/Post-Treatment    Pain: FACES Scale, Pretreatment  0-->no hurt  -KINGS     Posttreatment Pain Rating  0-->no hurt  -KINGS     Row Name 21          Bed Mobility    Bed Mobility  sit-supine  -KINGS     Supine-Sit [place] (Bed Mobility)  standby assist  -KINGS     Sit-Supine [place]gins (Bed Mobility)  -- in the chair   -KINGS     Assistive Device (Bed Mobility)  bed rails;head of bed elevated  -KINGS     Row Name 21          Transfers    Sit-Stand Hegins (Transfers)  verbal cues;contact guard  -KINGS     Stand-Sit [place]gins (Transfers)  verbal cues;contact guard  -KINGS     Heg[place] Level (Toilet Transfer)  verbal cues;contact guard  -KINGS     Assistive Device (Toilet Transfer)  commode;grab bars/safety frame;walker, front-wheeled  -KINGS     Row Name 21          Sit-Stand Transfer    Assistive Device (Sit-Stand Transfers)  walker, front-wheeled  -KINGS     Row Name 21          Stand-Sit Transfer    Assistive Device (Stand-Sit Transfers)  walker, front-wheeled  -KINGS     Row Name 21          Gait/Stairs (Locomotion)    [place] Level (Gait)  verbal cues;contact guard  -KINGS     Assistive Device (Gait)  walker, front-wheeled  -KINGS     Distance in Feet (Gait)  20' , 80'   -KINGS     Pattern (Gait)  step-to  -KINGS     Deviations/Abnormal Patterns  (Gait)  antalgic;gait speed decreased;stride length decreased  -KINGS     Bilateral Gait Deviations  forward flexed posture;heel strike decreased  -KINGS     Row Name             Wound 02/09/21 1715 Right hip Incision    Wound - Properties Group Placement Date: 02/09/21  - Placement Time: 1715  -SH Present on Hospital Admission: Y  -SH Side: Right  -SH Location: hip  -SH Primary Wound Type: Incision  -SH    Retired Wound - Properties Group Date first assessed: 02/09/21  - Time first assessed: 1715  -SH Present on Hospital Admission: Y  -SH Side: Right  -SH Location: hip  -SH Primary Wound Type: Incision  -SH    Row Name 07/08/21 0831          Positioning and Restraints    Pre-Treatment Position  in bed  -KINGS     Post Treatment Position  chair  -KINGS     In Chair  sitting;call light within reach;encouraged to call for assist;exit alarm on  -KINGS       User Key  (r) = Recorded By, (t) = Taken By, (c) = Cosigned By    Initials Name Provider Type    KINGS Garcia Francis PTA Physical Therapy Assistant    Laurie Ritter, RN Registered Nurse        Physical Therapy Education                 Title: PT OT SLP Therapies (In Progress)     Topic: Physical Therapy (In Progress)     Point: Mobility training (Done)     Learning Progress Summary           Patient Acceptance, E, VU by KINGS at 7/8/2021 0831    Comment: progression with amb    Acceptance, E, VU by MS at 7/7/2021 1511    Comment: role of PT in her care                   Point: Home exercise program (Not Started)     Learner Progress:  Not documented in this visit.          Point: Body mechanics (Not Started)     Learner Progress:  Not documented in this visit.          Point: Precautions (Not Started)     Learner Progress:  Not documented in this visit.                      User Key     Initials Effective Dates Name Provider Type Discipline    MS 06/19/18 -  Nicole Olson, PT, DPT, NCS Physical Therapist PT     12/08/16 -  Garcia Francis PTA Physical Therapy  Assistant PT              PT Recommendation and Plan     Plan of Care Reviewed With: patient  Progress: improving  Outcome Summary: Pt was able to perform bed mobility with SBA with HOB elevated and using the bed rail.  Transfered sit to stand with CGA.  Amb 20, 80' with RWX and CGA.  Outcome Measures     Row Name 07/07/21 1356             How much help from another is currently needed...    Putting on and taking off regular lower body clothing?  2  -AC      Bathing (including washing, rinsing, and drying)  2  -AC      Toileting (which includes using toilet bed pan or urinal)  2  -AC      Putting on and taking off regular upper body clothing  4  -AC      Taking care of personal grooming (such as brushing teeth)  4  -AC      Eating meals  4  -AC      AM-PAC 6 Clicks Score (OT)  18  -AC         Functional Assessment    Outcome Measure Options  AM-PAC 6 Clicks Daily Activity (OT)  -AC        User Key  (r) = Recorded By, (t) = Taken By, (c) = Cosigned By    Initials Name Provider Type    AC Sebastián Howe, OTR/L, CNT Occupational Therapist           Time Calculation:   PT Charges     Row Name 07/08/21 0831             Time Calculation    Start Time  0831  -KINGS      Stop Time  0854  -KINGS      Time Calculation (min)  23 min  -KINGS      PT Received On  07/08/21  -KINGS         Time Calculation- PT    Total Timed Code Minutes- PT  23 minute(s)  -KINGS         Timed Charges    10817 - Gait Training Minutes   23  -KINGS         Total Minutes    Timed Charges Total Minutes  23  -KINGS       Total Minutes  23  -KINGS        User Key  (r) = Recorded By, (t) = Taken By, (c) = Cosigned By    Initials Name Provider Type    Garcia Jaramillo PTA Physical Therapy Assistant        Therapy Charges for Today     Code Description Service Date Service Provider Modifiers Qty    88014423057 HC GAIT TRAINING EA 15 MIN 7/8/2021 Garcia Francis PTA GP 2          PT G-Codes  Outcome Measure Options: AM-PAC 6 Clicks Daily Activity (OT)  AM-PAC 6 Clicks  Score (PT): 16  AM-PAC 6 Clicks Score (OT): 18    Garcia Francis, PTA  7/8/2021

## 2021-07-08 NOTE — PROGRESS NOTES
1           [facility] Medicine Services  INPATIENT PROGRESS NOTE    Patient Name: Tawny Shin  Date of Admission: 7/6/2021  Today's Date: 07/08/21  Length of Stay: 2  Primary Care Physician: Davon Urrutia MD    Subjective   Chief Complaint: Follow-up  HPI   Patient states doing well  She is not complaining of any pain  She is anticipating surgery later today  Appreciate consultants.  Patient plan for surgery at around 4 PM for deep tissue culture/bone.        Review of Systems     All pertinent negatives and positives are as above. All other systems have been reviewed and are negative unless otherwise stated.     Objective    Temp:  [97.6 °F (36.4 °C)-98.9 °F (37.2 °C)] 97.6 °F (36.4 °C)  Heart Rate:  [61-78] 62  Resp:  [16-18] 18  BP: ()/(47-74) 125/52  Physical Exam   Seated comfortably on recliner.  No distress  She is neurologically intact.   She is interactive and appropriate in affect  Anicteric sclera, glassy appearance of left pupil  Supple neck  No thyromegaly  Moist oral mucosa  Lungs are clear to auscultation with good air exchange  S1-S2, regular rate and rhythm  Soft abdomen, nontender, no organomegaly  No cyanosis  Positive pitting edema      Results Review:  I have reviewed the labs, radiology results, and diagnostic studies.    Laboratory Data:   Results from last 7 days   Lab Units 07/07/21  0550 07/06/21  1946   WBC 10*3/mm3 4.23 5.16   HEMOGLOBIN g/dL 8.5* 9.7*   HEMATOCRIT % 27.0* 31.5*   PLATELETS 10*3/mm3 238 297        Results from last 7 days   Lab Units 07/07/21  0550 07/06/21  1946   SODIUM mmol/L 141 139   POTASSIUM mmol/L 3.6 3.6   CHLORIDE mmol/L 103 100   CO2 mmol/L 31.0* 29.0   BUN mg/dL 10 11   CREATININE mg/dL 0.77 0.90   CALCIUM mg/dL 8.6 9.2   BILIRUBIN mg/dL 0.2  --    ALK PHOS U/L 99  --    ALT (SGPT) U/L 6  --    AST (SGOT) U/L 16  --    GLUCOSE mg/dL 88 136*       Culture Data:   Blood Culture   Date Value Ref Range Status    07/06/2021 No growth at 24 hours  Preliminary   07/06/2021 No growth at 24 hours  Preliminary     Urine Culture   Date Value Ref Range Status   07/06/2021 >100,000 CFU/mL Mixed Selina Isolated  Final       Radiology Data:   Imaging Results (Last 24 Hours)     Procedure Component Value Units Date/Time    MRI Hip Right Without Contrast [[id number]] Collected: 07/07/21 1645     Updated: 07/07/21 1651    Narrative:      EXAMINATION: MRI HIP RIGHT WO CONTRAST-     7/7/2021 3:23 PM CDT     HISTORY: Osteomyelitis suspected, hip, xray done; M86.9-Osteomyelitis,  unspecified; R41.82-Altered mental status, unspecified; Z78.9-Other  specified health status; R68.89-Other general symptoms and signs     Noncontrast MR imaging of the right hip.     Comparison is made with pelvis radiograph performed yesterday,  abdomen/pelvis CT from 5/26/2021, and pelvis/hip MRI from 2/9/2021.     Slightly increased joint fluid.     Pin tracks associated with greater trochanter hardware removal are again  seen.  There is increased marrow edema within the base of the right femoral  neck and the intertrochanteric region compatible with osteomyelitis  unless there has been recent instrumentation at the right hip.     There is extensive right gluteus muscle edema again seen and a large  soft tissue wound adjacent to the right hip is noted.     No drainable abscess collection is seen.     Summary:  1. Large deep soft tissue wound adjacent to the right hip.  2. Diffuse right gluteus muscle edema with no drainable abscess  collection.  3. Increased marrow edema within the proximal right femur compatible  with osteomyelitis.  This report was finalized on 07/07/2021 16:48 by Dr. Jesus Manuel Santos MD.          I have reviewed the patient's current medications.     Assessment/Plan     Active Hospital Problems    Diagnosis    • **Osteomyelitis of right femur (CMS/HCC)    • Transient alteration of awareness    • Non-healing surgical wound, right hip    • Chronic  deep vein thrombosis (DVT) of right lower extremity (CMS/HCC)    • Chronic pain syndrome          We will discuss with Ortho and infectious disease regarding timing of referral to plastic surgery (flap closure).  I think the main goal at this time would be determined whether patient has an ongoing infection.  Patient is currently off antibiotics.  She received antibiotic in the emergency room.  Will defer to infectious disease if needed current antibiotic pending any deep tissue cultures  PT/OT    Continue supportive care  apixaban, 5 mg, Oral, Q12H  carvedilol, 25 mg, Oral, BID With Meals  fluticasone, 1 spray, Nasal, Daily  furosemide, 40 mg, Oral, Daily  gabapentin, 300 mg, Oral, Nightly  isosorbide mononitrate, 60 mg, Oral, BID  levothyroxine, 75 mcg, Oral, Q AM  lidocaine, 1 patch, Transdermal, Nightly  magnesium oxide, 400 mg, Oral, Daily  multivitamin with minerals, 1 tablet, Oral, Daily  naproxen, 375 mg, Oral, BID With Meals  pantoprazole, 40 mg, Oral, Daily  potassium chloride, 20 mEq, Oral, Daily  saccharomyces boulardii, 250 mg, Oral, Daily  sodium chloride, 10 mL, Intravenous, Q12H  sucralfate, 1 g, Oral, 4x Daily AC & at Bedtime  traMADol, 50 mg, Oral, TID  valACYclovir, 500 mg, Oral, Q24H            Discharge Planning: To be determined    Electronically signed by Jun Hawley MD, 07/08/21, 11:32 CDT.

## 2021-07-08 NOTE — CASE MANAGEMENT/SOCIAL WORK
Continued Stay Note  [facility]     Patient Name: Tawny Shin  MRN: [id number]  Today's Date: 7/8/2021    Admit Date: 7/6/2021    Discharge Plan     Row Name 07/08/21 1014       Plan    Plan Comments  MEGA spoke with Dr Shin who is requesitng a referral to [facility]. MEGA spoke with magaly Ramsey, who is aware. SW will follow for possible bed offer        Discharge Codes    No documentation.             Bianca Barragan

## 2021-07-08 NOTE — OP NOTE
Patient Name: Diego  MRN: [id number]  : 1955    DATE of SURGERY: 2021    SURGEON: James Huntley MD    ASSISTANT: NONE      PREOPERATIVE DIAGNOSIS: Chronic surgical wound, right hip     POSTOPERATIVE DIAGNOSIS:  Chronic surgical wound, right hip     PROCEDURE PERFORMED:   1) Irrigation and excisional debridement, right hip (skin, subcutneous tissue, muscle, bone)  2) Bone biopsy, right greater trochanter     IMPLANTS: None.      ANESTHESIA: General endotracheal.      INDICATIONS FOR OPERATION: The patient is a 65 y.o. female who underwent a repair of her right gluteus medius tendons by Dr. aCrlson several months ago who developed a postoperative wound infection.  She was readmitted after x-rays showed possible osteomyelitis.  An MRI showed an extensive inflammatory response within the greater trochanter and surrounding gluteus musculature.   I have been asked to intervene as Dr. Carlson is currently out of town.  I have been asked obtain a sample of bone from the greater trochanter for cultures to determine if there is osteomyelitis present. Risks included that of anesthesia, pain, damage to local structures, and need for further surgery.      ESTIMATED BLOOD LOSS: 50 mL      SPECIMENS: deep cultures were obtained including a sample of bone from the greater trochanter     DRAINS: none     COMPLICATIONS: None.      FINDINGS: see op note     DESCRIPTION OF PROCEDURE: The patient was seen in the preop holding room, was transferred down to the operating room. Once he was positioned safely on the operating room table in the lateral position, a sterile prep and drape was performed. There was large wound measuring 4 x 8 cm.  It extended down to the level of the greater trochanter.  There were multiple sutures and accompanying anchors present.  Sutures were no longer attached to soft tissue and were free-floating.  As a result, sutures and their anchors were removed.      A systematic excisional debridement  of skin, subcutaneous tissue, muscle, and bone was performed with a curette and rongeur.  The greater trochanter was quite soft and with progressive sized curettes the medullary canal was easily entered and a sample of bone was obtained for cultures.  Additional deep soft tissue cultures were obtained as well.  9 liters of saline were used to irrigate the wound.    A sterile Kerlix was used to pack the wound, covered with 4x4's and tape. She was transferred to the PACU in stable condition.      POSTOPERATIVE PLAN:   1) IV antibiotics per ID, agree with Dr. Copeland that flap coverage will likely be necessary in the near future.  2) f/u Cultures  3) wound packing/dressing changes per Dr. Carlson's team    Electronically signed by James Huntley MD on 7/8/2021 at 19:19 CDT

## 2021-07-08 NOTE — PLAN OF CARE
Goal Outcome Evaluation:  Plan of Care Reviewed With: patient        Progress: no change  Outcome Summary: VSS. No change in nv/neuro status this shift. Drsg changed per order, no drainage. C/o pain x 1 , prn tylenol given. Up ambulate to br. Safety maintained.

## 2021-07-08 NOTE — PLAN OF CARE
Goal Outcome Evaluation:  Plan of Care Reviewed With: patient        Progress: improving  Outcome Summary: Pt was able to perform bed mobility with SBA with HOB elevated and using the bed rail.  Transfered sit to stand with CGA.  Amb 20, 80' with RWX and CGA.

## 2021-07-08 NOTE — THERAPY TREATMENT NOTE
Acute Care - Physical Therapy Treatment Note  [facility]     Patient Name: Tawny Shin  : 1955  MRN: [id number]  Today's Date: 2021   Onset of Illness/Injury or Date of Surgery: 21       PT Assessment (last 12 hours)      PT Evaluation and Treatment     Row Name 21 1500 21 0831       Physical Therapy Time and Intention    Subjective Information  no complaints  -KINGS  no complaints  -KINGS    Document Type  therapy note (daily note)  -KINGS  therapy note (daily note)  -KINGS    Mode of Treatment  physical therapy  -KINGS  physical therapy  -KINGS    Row Name 21 1500 21 0831       General Information    Existing Precautions/Restrictions  fall  -KINGS  fall  -KINGS    Row Name 21 1500 21 0831       Pain Scale: FACES Pre/Post-Treatment    Pain: FACES Scale, Pretreatment  2-->hurts little bit  -KINGS  0-->no hurt  -KINGS    Posttreatment Pain Rating  2-->hurts little bit  -KINGS  0-->no hurt  -KINGS    Pain Location - Side  Right  -KINGS  --    Pain Location  extremity  -KINGS  --    Row Name 21 1500 21 0831       Bed Mobility    Bed Mobility  --  sit-supine  -KINGS    Supine-Sit [place] (Bed Mobility)  standby assist  -KINGS  standby assist  -KINGS    Sit-Supine [place] (Bed Mobility)  standby assist  -KINGS  -- in the chair   -KINGS    Assistive Device (Bed Mobility)  --  bed rails;head of bed elevated  -KINGS    Row Name 21 1500 21 0831       Transfers    Sit-Stand [place] (Transfers)  verbal cues;contact guard  -KINGS  verbal cues;contact guard  -KINGS    Stand-Sit [place] (Transfers)  verbal cues;contact guard  -KINGS  verbal cues;contact guard  -KINGS    [place] Level (Toilet Transfer)  standby assist  -KINGS  verbal cues;contact guard  -KINGS    Assistive Device (Toilet Transfer)  commode;grab bars/safety frame;walker, front-wheeled  -KINGS  commode;grab bars/safety frame;walker, front-wheeled  -KINGS    Row Name 21 1500 21 0831       Sit-Stand Transfer    Assistive Device  (Sit-Stand Transfers)  walker, front-wheeled  -KINGS  walker, front-wheeled  -KINGS    Row Name 07/08/21 1500 07/08/21 0831       Stand-Sit Transfer    Assistive Device (Stand-Sit Transfers)  walker, front-wheeled  -KINGS  walker, front-wheeled  -KINGS    Row Name 07/08/21 1500 07/08/21 0831       Gait/Stairs (Locomotion)    [place] Level (Gait)  verbal cues;contact guard  -KINGS  verbal cues;contact guard  -KINGS    Assistive Device (Gait)  walker, front-wheeled  -KINGS  walker, front-wheeled  -KINGS    Distance in Feet (Gait)  120  -KINGS  20' , 80'   -KINGS    Pattern (Gait)  step-to  -KINGS  step-to  -KINGS    Deviations/Abnormal Patterns (Gait)  antalgic;gait speed decreased  -KINGS  antalgic;gait speed decreased;stride length decreased  -KINGS    Bilateral Gait Deviations  forward flexed posture  -KINGS  forward flexed posture;heel strike decreased  -KINGS    Row Name             Wound 02/09/21 1715 Right hip Incision    Wound - Properties Group Placement Date: 02/09/21  - Placement Time: 1715  - Present on Hospital Admission: Y  -SH Side: Right  -SH Location: hip  -SH Primary Wound Type: Incision  -SH    Retired Wound - Properties Group Date first assessed: 02/09/21  - Time first assessed: 1715  - Present on Hospital Admission: Y  -SH Side: Right  -SH Location: hip  -SH Primary Wound Type: Incision  -SH    Row Name 07/08/21 1500 07/08/21 0831       Positioning and Restraints    Pre-Treatment Position  in bed  -KINGS  in bed  -KINGS    Post Treatment Position  bed  -KINGS  chair  -KINGS    In Bed  fowlers;call light within reach;encouraged to call for assist;side rails up x2  -KINGS  --    In Chair  --  sitting;call light within reach;encouraged to call for assist;exit alarm on  -KINGS      User Key  (r) = Recorded By, (t) = Taken By, (c) = Cosigned By    Initials Name Provider Type    Garcia Jaramillo PTA Physical Therapy Assistant    Laurie Ritter, RN Registered Nurse        Physical Therapy Education                 Title: PT OT SLP Therapies (In  Progress)     Topic: Physical Therapy (In Progress)     Point: Mobility training (Done)     Learning Progress Summary           Patient Acceptance, E, VU by KINGS at 7/8/2021 0831    Comment: progression with amb    Acceptance, E, VU by MS at 7/7/2021 1511    Comment: role of PT in her care                   Point: Home exercise program (Not Started)     Learner Progress:  Not documented in this visit.          Point: Body mechanics (Not Started)     Learner Progress:  Not documented in this visit.          Point: Precautions (Not Started)     Learner Progress:  Not documented in this visit.                      User Key     Initials Effective Dates Name Provider Type Discipline    MS 06/19/18 -  Nicole Olson, PT, DPT, NCS Physical Therapist PT    KINGS 12/08/16 -  Garcia Francis, PTA Physical Therapy Assistant PT              PT Recommendation and Plan     Plan of Care Reviewed With: patient  Progress: improving  Outcome Summary: Pt was able to perform bed mobility with SBA with HOB elevated and using the bed rail.  Transfered sit to stand with CGA.  Amb 20, 80' with RWX and CGA.  Outcome Measures     Row Name 07/07/21 9026             How much help from another is currently needed...    Putting on and taking off regular lower body clothing?  2  -AC      Bathing (including washing, rinsing, and drying)  2  -AC      Toileting (which includes using toilet bed pan or urinal)  2  -AC      Putting on and taking off regular upper body clothing  4  -AC      Taking care of personal grooming (such as brushing teeth)  4  -AC      Eating meals  4  -AC      AM-PAC 6 Clicks Score (OT)  18  -AC         Functional Assessment    Outcome Measure Options  AM-PAC 6 Clicks Daily Activity (OT)  -AC        User Key  (r) = Recorded By, (t) = Taken By, (c) = Cosigned By    Initials Name Provider Type    AC Sebastián Howe, OTR/L, CNT Occupational Therapist           Time Calculation:   PT Charges     Row Name 07/08/21 1500 07/08/21 0831           Time Calculation    Start Time  1500  -KINGS  0831  -KINGS     Stop Time  1514  -KINGS  0854  -KINGS     Time Calculation (min)  14 min  -KINGS  23 min  -KINGS     PT Received On  07/08/21  -KINGS  07/08/21  -KINGS        Time Calculation- PT    Total Timed Code Minutes- PT  14 minute(s)  -KINGS  23 minute(s)  -KINGS        Timed Charges    11891 - Gait Training Minutes   14  -KINGS  23  -KINGS        Total Minutes    Timed Charges Total Minutes  14  -KINGS  23  -KINGS      Total Minutes  14  -KINGS  23  -KINGS       User Key  (r) = Recorded By, (t) = Taken By, (c) = Cosigned By    Initials Name Provider Type    Garcia Jaramillo PTA Physical Therapy Assistant        Therapy Charges for Today     Code Description Service Date Service Provider Modifiers Qty    77637842908 HC GAIT TRAINING EA 15 MIN 7/8/2021 Garcia Francis PTA GP 2    [id number] HC GAIT TRAINING EA 15 MIN 7/8/2021 Garcia Francis PTA GP 1          PT G-Codes  Outcome Measure Options: AM-PAC 6 Clicks Daily Activity (OT)  AM-PAC 6 Clicks Score (PT): 16  AM-PAC 6 Clicks Score (OT): 18    Garcia Francis PTA  7/8/2021

## 2021-07-08 NOTE — NURSING NOTE
Handoff w/oj rodriguez. No neuro/nv changes. Silverio neville notified in hallway pt would be going to surgery in afternoon.

## 2021-07-08 NOTE — BRIEF OP NOTE
INCISION AND DRAINAGE WOUND  Progress Note    Tawny SHELLI Kip  7/8/2021    Pre-op Diagnosis:   chronic R hip wound       Post-Op Diagnosis Codes:     * Chronic infection of right hip on antibiotics (CMS/[facility]) [M00.9]    Procedure/CPT® Codes:  TN BONE BIOPSY,EXCISIONAL DEEP [20245]      Procedure(s):  INCISION AND DRAINAGE WOUND RIGHT HIP    Surgeon(s):  James Huntley MD    Anesthesia: General    Staff:   Circulator: Shaista Mena RN  Scrub Person: Soo Kidd  Assistant: Maritza Arteaga  Other: Gillian De La Cruz  Assistant: Maritza Arteaga      Estimated Blood Loss: <500ml    Urine Voided: * No values recorded between 7/8/2021 12:00 AM and 7/8/2021  6:27 PM *    Specimens:                None          Drains:   [REMOVED] External Urinary Catheter (Removed)   Site Assessment Clean;Skin intact 06/23/21 0854   Application/Removal external catheter changed 06/23/21 0854   Collection Container Wall suction 06/23/21 0854   Wall suction (mmHG) 60 mmHG 06/23/21 0854   Securement Method Securing device 06/23/21 0854   Catheter care complete Yes 06/23/21 0854   Output (mL) 300 mL 06/23/21 0345       Findings: see op note    Complications: none        James Huntley MD     Date: 7/8/2021  Time: 18:27 CDT

## 2021-07-08 NOTE — PROGRESS NOTES
Orthopaedic Surgery Progress Note      7/8/2021   07:54 CDT    Name:  Tawny Shin  MRN:    [id number]     Acct:     [id number]   Room:  [facility] Day: 2     Admit Date: 7/6/2021  PCP: Davon Urrutia MD        Subjective:     C/C: Right hip chronic wound/suspected osteomyelitis    Interval History: Patient pain is controlled currently. MRI ordered last night which confirmed osteomyelitis or right femur. Patient has had wound vac off and continued wet to dry dressings for now. No new orthopedic complaints currently.       Medications:     Allergies:   Allergies   Allergen Reactions   • Atorvastatin Other (See Comments)     LEG CRAMPS     • Amoxicillin Rash   • Escitalopram Rash   • Nabumetone Rash   • Niacin Er Rash   • Penicillins Rash   • Simvastatin Rash       Current Meds:   Current Facility-Administered Medications   Medication Dose Route Frequency Provider Last Rate Last Admin   • acetaminophen (TYLENOL) tablet 650 mg  650 mg Oral Q4H PRN Mayra Chong APRN   650 mg at 07/08/21 0447    Or   • acetaminophen (TYLENOL) 160 MG/5ML solution 650 mg  650 mg Oral Q4H PRN Mayra Chong, APRNISREEN        Or   • acetaminophen (TYLENOL) suppository 650 mg  650 mg Rectal Q4H PRN Mayra Chong, APRN       • apixaban (ELIQUIS) tablet 5 mg  5 mg Oral Q12H Mayra Chong APRN   5 mg at 07/07/21 2159   • carvedilol (COREG) tablet 25 mg  25 mg Oral BID With Meals Mayra Chong APRN   25 mg at 07/07/21 1726   • fluticasone (FLONASE) 50 MCG/ACT nasal spray 1 spray  1 spray Nasal Daily Mayra Chong APRN   1 spray at 07/07/21 1307   • furosemide (LASIX) tablet 40 mg  40 mg Oral Daily Mayra Chong APRN   40 mg at 07/07/21 1253   • gabapentin (NEURONTIN) capsule 300 mg  300 mg Oral Nightly Mayra Chong APRN   300 mg at 07/07/21 2159   • isosorbide mononitrate (IMDUR) 24 hr tablet 60 mg  60 mg Oral BID Mayra Chong APRN   60 mg at 07/07/21 2203   • levothyroxine (SYNTHROID, LEVOTHROID)  tablet 75 mcg  75 mcg Oral Q AM Mayra Chong, APRN   75 mcg at 07/08/21 0636   • lidocaine (LIDODERM) 5 % 1 patch  1 patch Transdermal Nightly Mayra Chong, APRN   1 patch at 07/07/21 2159   • magnesium oxide (MAG-OX) tablet 400 mg  400 mg Oral Daily Mayra Chong, APRN   400 mg at 07/07/21 1253   • multivitamin with minerals 1 tablet  1 tablet Oral Daily Mayra Chong APRN   1 tablet at 07/07/21 1253   • naproxen (NAPROSYN) tablet 375 mg  375 mg Oral BID With Meals Mayra Chong, APRN   375 mg at 07/07/21 1725   • nitroglycerin (NITROSTAT) SL tablet 0.4 mg  0.4 mg Sublingual Q5 Min PRN Mayra Chong, APRN       • ondansetron (ZOFRAN) tablet 4 mg  4 mg Oral Q6H PRN Mayra Chong, APRN        Or   • ondansetron (ZOFRAN) injection 4 mg  4 mg Intravenous Q6H PRN Mayra Chong, APRN       • pantoprazole (PROTONIX) EC tablet 40 mg  40 mg Oral Daily aMyra Chong, APRN   40 mg at 07/07/21 1300   • potassium chloride (MICRO-K) CR capsule 20 mEq  20 mEq Oral Daily Mayra Chong, APRN   20 mEq at 07/07/21 1254   • saccharomyces boulardii (FLORASTOR) capsule 250 mg  250 mg Oral Daily Mayra Chong, APRN   250 mg at 07/07/21 1256   • sodium chloride 0.9 % flush 10 mL  10 mL Intravenous Q12H Mayra Chong, APRN   10 mL at 07/07/21 0042   • sodium chloride 0.9 % flush 10 mL  10 mL Intravenous PRN Mayra Chong, APRN       • sucralfate (CARAFATE) tablet 1 g  1 g Oral 4x Daily AC & at Bedtime Mayra Chong, APRN   1 g at 07/08/21 0636   • traMADol (ULTRAM) tablet 50 mg  50 mg Oral TID Mayra Chong, APRN   50 mg at 07/07/21 2159   • valACYclovir (VALTREX) tablet 500 mg  500 mg Oral Q24H Mayra Chong, APRN   500 mg at 07/07/21 1256           Data:     Code Status:    Code Status and Medical Interventions:   Ordered at: 07/06/21 8744     Code Status:    CPR     Medical Interventions (Level of Support Prior to Arrest):    Full       Family History   Problem Relation  "Age of Onset   • Cancer Mother    • Heart disease Father        Social History     Socioeconomic History   • Marital status:      Spouse name: Not on file   • Number of children: Not on file   • Years of education: Not on file   • Highest education level: Not on file   Tobacco Use   • Smoking status: Never Smoker   • Smokeless tobacco: Never Used   Substance and Sexual Activity   • Alcohol use: No   • Drug use: Never   • Sexual activity: Defer       Vitals:  /52 (BP Location: Left arm, Patient Position: Lying)   Pulse 62   Temp 97.6 °F (36.4 °C) (Oral)   Resp 18   Ht 167.6 cm (65.98\")   Wt 89.9 kg (198 lb 1.6 oz)   SpO2 95%   BMI 31.99 kg/m²   Temp (24hrs), Av °F (36.7 °C), Min:97.6 °F (36.4 °C), Max:98.9 °F (37.2 °C)      I/O (24Hr):  No intake or output data in the 24 hours ending 21 0754    Labs:  Lab Results (last 72 hours)     Procedure Component Value Units Date/Time    Blood Culture - Blood, Arm, Right [[id number]] Collected: 21    Specimen: Blood from Arm, Right Updated: 21     Blood Culture No growth at 24 hours    Blood Culture - Blood, Arm, Right [[id number]] Collected: 21    Specimen: Blood from Arm, Right Updated: 21     Blood Culture No growth at 24 hours    Comprehensive Metabolic Panel [[id number]]  (Abnormal) Collected: 21 0550    Specimen: Blood Updated: 21 0637     Glucose 88 mg/dL      BUN 10 mg/dL      Creatinine 0.77 mg/dL      Sodium 141 mmol/L      Potassium 3.6 mmol/L      Chloride 103 mmol/L      CO2 31.0 mmol/L      Calcium 8.6 mg/dL      Total Protein 6.0 g/dL      Albumin 2.40 g/dL      ALT (SGPT) 6 U/L      AST (SGOT) 16 U/L      Alkaline Phosphatase 99 U/L      Total Bilirubin 0.2 mg/dL      eGFR Non African Amer 75 mL/min/1.73      Globulin 3.6 gm/dL      A/G Ratio 0.7 g/dL      BUN/Creatinine Ratio 13.0     Anion Gap 7.0 mmol/L     Narrative:      GFR Normal >60  Chronic Kidney Disease <60  Kidney " Failure <15      C-reactive Protein [[id number]]  (Abnormal) Collected: 07/07/21 0550    Specimen: Blood Updated: 07/07/21 0637     C-Reactive Protein 9.48 mg/dL     Sedimentation Rate [[id number]]  (Abnormal) Collected: 07/07/21 0550    Specimen: Blood Updated: 07/07/21 0626     Sed Rate 54 mm/hr     CBC Auto Differential [[id number]]  (Abnormal) Collected: 07/07/21 0550    Specimen: Blood Updated: 07/07/21 0611     WBC 4.23 10*3/mm3      RBC 2.97 10*6/mm3      Hemoglobin 8.5 g/dL      Hematocrit 27.0 %      MCV 90.9 fL      MCH 28.6 pg      MCHC 31.5 g/dL      RDW 15.5 %      RDW-SD 51.8 fl      MPV 9.5 fL      Platelets 238 10*3/mm3      Neutrophil % 38.1 %      Lymphocyte % 32.6 %      Monocyte % 18.4 %      Eosinophil % 9.5 %      Basophil % 1.2 %      Immature Grans % 0.2 %      Neutrophils, Absolute 1.61 10*3/mm3      Lymphocytes, Absolute 1.38 10*3/mm3      Monocytes, Absolute 0.78 10*3/mm3      Eosinophils, Absolute 0.40 10*3/mm3      Basophils, Absolute 0.05 10*3/mm3      Immature Grans, Absolute 0.01 10*3/mm3      nRBC 0.0 /100 WBC     COVID PRE-OP / PRE-PROCEDURE SCREENING ORDER (NO ISOLATION) - Swab, Nasal Cavity [[id number]]  (Normal) Collected: 07/06/21 2052    Specimen: Swab from Nasal Cavity Updated: 07/06/21 2140    Narrative:      The following orders were created for panel order COVID PRE-OP / PRE-PROCEDURE SCREENING ORDER (NO ISOLATION) - Swab, Nasal Cavity.  Procedure                               Abnormality         Status                     ---------                               -----------         ------                     COVID-19,[facility]...[[id number]]  Normal              Final result                 Please view results for these tests on the individual orders.    COVID-19,[facility] IN-HOUSE,Nasal Swab No Transport Media 3-4 HR TAT - Swab, Nasal Cavity [[id number]]  (Normal) Collected: 07/06/21 2052    Specimen: Swab from Nasal Cavity Updated: 07/06/21 5469     COVID19 Not Detected     Narrative:      Fact sheet for providers: https://www.fda.gov/media/184229/download     Fact sheet for patients: https://www.fda.gov/media/605492/download    Test performed by PCR.    Consider negative results in combination with clinical observations, patient history, and epidemiological information.    Urinalysis With Culture If Indicated - Urine, Clean Catch [[id number]]  (Abnormal) Collected: 07/06/21 2040    Specimen: Urine, Clean Catch Updated: 07/06/21 2106     Color, UA Yellow     Appearance, UA Clear     pH, UA <=5.0     Specific Gravity, UA 1.014     Glucose, UA Negative     Ketones, UA Negative     Bilirubin, UA Negative     Blood, UA Negative     Protein, UA Negative     Leuk Esterase, UA Small (1+)     Nitrite, UA Negative     Urobilinogen, UA 0.2 E.U./dL    Urinalysis, Microscopic Only - Urine, Clean Catch [[id number]]  (Abnormal) Collected: 07/06/21 2040    Specimen: Urine, Clean Catch Updated: 07/06/21 2106     RBC, UA 0-2 /HPF      WBC, UA 6-12 /HPF      Bacteria, UA None Seen /HPF      Squamous Epithelial Cells, UA 13-20 /HPF      Hyaline Casts, UA 3-6 /LPF      Methodology Automated Microscopy    Urine Culture - Urine, Urine, Clean Catch [[id number]] Collected: 07/06/21 2040    Specimen: Urine, Clean Catch Updated: 07/06/21 2106    Blood Gas, Venous - [[id number]]  (Abnormal) Collected: 07/06/21 2045    Specimen: Venous Blood Updated: 07/06/21 2052     Site OTHER     pH, Venous 7.383 pH Units      pCO2, Venous 55.1 mm Hg      Comment: 83 Value above reference range        pO2, Venous 29.3 mm Hg      HCO3, Venous 32.8 mmol/L      Comment: 83 Value above reference range        Base Excess, Venous 6.1 mmol/L      Comment: 83 Value above reference range        O2 Saturation, Venous 44.5 %      Comment: 84 Value below reference range        Temperature 37.0 C      Barometric Pressure for Blood Gas 750 mmHg      Modality Room Air     Ventilator Mode NA     Collected by [id number]     Comment: Meter:  [id number]     :  [id number]       C-reactive Protein [[id number]]  (Abnormal) Collected: 07/06/21 1946    Specimen: Blood Updated: 07/06/21 2050     C-Reactive Protein 12.16 mg/dL     Sedimentation Rate [[id number]]  (Abnormal) Collected: 07/06/21 1946    Specimen: Blood Updated: 07/06/21 2039     Sed Rate 101 mm/hr     Procalcitonin [[id number]]  (Normal) Collected: 07/06/21 1946    Specimen: Blood Updated: 07/06/21 2026     Procalcitonin 0.07 ng/mL     Narrative:      As a Marker for Sepsis (Non-Neonates):     1. <0.5 ng/mL represents a low risk of severe sepsis and/or septic shock.  2. >2 ng/mL represents a high risk of severe sepsis and/or septic shock.    As a Marker for Lower Respiratory Tract Infections that require antibiotic therapy:  PCT on Admission     Antibiotic Therapy             6-12 Hrs later  >0.5                          Strongly Recommended            >0.25 - <0.5             Recommended  0.1 - 0.25                  Discouraged                       Remeasure/reassess PCT  <0.1                         Strongly Discouraged         Remeasure/reassess PCT      As 28 day mortality risk marker: \"Change in Procalcitonin Result\" (>80% or <=80%) if Day 0 (or Day 1) and Day 4 values are available. Refer to http://www.[facility]-pct-calculator.com/    Change in PCT <=80 %   A decrease of PCT levels below or equal to 80% defines a positive change in PCT test result representing a higher risk for 28-day all-cause mortality of patients diagnosed with severe sepsis or septic shock.    Change in PCT >80 %   A decrease of PCT levels of more than 80% defines a negative change in PCT result representing a lower risk for 28-day all-cause mortality of patients diagnosed with severe sepsis or septic shock.                Basic Metabolic Panel [[id number]]  (Abnormal) Collected: 07/06/21 1946    Specimen: Blood Updated: 07/06/21 2022     Glucose 136 mg/dL      BUN 11 mg/dL      Creatinine 0.90 mg/dL      Sodium " 139 mmol/L      Potassium 3.6 mmol/L      Chloride 100 mmol/L      CO2 29.0 mmol/L      Calcium 9.2 mg/dL      eGFR Non African Amer 62 mL/min/1.73      BUN/Creatinine Ratio 12.2     Anion Gap 10.0 mmol/L     Narrative:      GFR Normal >60  Chronic Kidney Disease <60  Kidney Failure <15      CK [[id number]]  (Normal) Collected: 07/06/21 1946    Specimen: Blood Updated: 07/06/21 2022     Creatine Kinase 35 U/L     Lactic Acid, Plasma [[id number]]  (Normal) Collected: 07/06/21 1946    Specimen: Blood Updated: 07/06/21 2011     Lactate 1.8 mmol/L     aPTT [[id number]]  (Abnormal) Collected: 07/06/21 1946    Specimen: Blood Updated: 07/06/21 2006     PTT 47.8 seconds     Protime-INR [[id number]]  (Abnormal) Collected: 07/06/21 1946    Specimen: Blood Updated: 07/06/21 2006     Protime 17.4 Seconds      INR 1.55    CBC & Differential [[id number]]  (Abnormal) Collected: 07/06/21 1946    Specimen: Blood Updated: 07/06/21 1959    Narrative:      The following orders were created for panel order CBC & Differential.  Procedure                               Abnormality         Status                     ---------                               -----------         ------                     CBC Auto Differential[[id number]]        Abnormal            Final result                 Please view results for these tests on the individual orders.    CBC Auto Differential [[id number]]  (Abnormal) Collected: 07/06/21 1946    Specimen: Blood Updated: 07/06/21 1959     WBC 5.16 10*3/mm3      RBC 3.43 10*6/mm3      Hemoglobin 9.7 g/dL      Hematocrit 31.5 %      MCV 91.8 fL      MCH 28.3 pg      MCHC 30.8 g/dL      RDW 15.4 %      RDW-SD 51.9 fl      MPV 9.4 fL      Platelets 297 10*3/mm3      Neutrophil % 52.6 %      Lymphocyte % 23.6 %      Monocyte % 14.5 %      Eosinophil % 8.3 %      Basophil % 0.8 %      Immature Grans % 0.2 %      Neutrophils, Absolute 2.71 10*3/mm3      Lymphocytes, Absolute 1.22 10*3/mm3      Monocytes, Absolute 0.75  10*3/mm3      Eosinophils, Absolute 0.43 10*3/mm3      Basophils, Absolute 0.04 10*3/mm3      Immature Grans, Absolute 0.01 10*3/mm3      nRBC 0.0 /100 WBC         Medical Data Review: MRI reviewed personally by myself and discussed with Dr. Huntley. The MRI confirms suspected osteomyelitis with significant bone marrow edema in the greater trochanter and surrounding soft tissue.       Physical Exam:     General: NAD, cooperative with exam, AAOX3.   Left Hip: dressing in place, clean, dry, intact. No surrounding erythema. Able to move hip freely with mild pain. NVI.     Assessment:     Primary Problem  Osteomyelitis of right femur (CMS/HCC)  Chronic surgical wound right hip    Plan:     1. Surgical Plan - Dr. Huntley plans to take patient to OR this afternoon for I&D and to obtain deep cultures of tissue/bone.   2. Patient is likely going to need plastic surgery consult for chronic hip wound  3. NPO after breakfast  4. DVT PPX       Electronically signed by Silverio Parekh PA-C on 7/8/2021 at 07:54 CDT

## 2021-07-08 NOTE — PLAN OF CARE
Problem: Fall Injury Risk  Goal: Absence of Fall and Fall-Related Injury  Outcome: Ongoing, Progressing  Intervention: Promote Injury-Free Environment  Recent Flowsheet Documentation  Taken 7/8/2021 1600 by Anamaria May RN  Safety Promotion/Fall Prevention: safety round/check completed  Taken 7/8/2021 1459 by Anamaria May RN  Safety Promotion/Fall Prevention: safety round/check completed  Taken 7/8/2021 1400 by Anamaria May RN  Safety Promotion/Fall Prevention: safety round/check completed  Taken 7/8/2021 1300 by Anamaria May RN  Safety Promotion/Fall Prevention: safety round/check completed  Taken 7/8/2021 1200 by Anamaria May RN  Safety Promotion/Fall Prevention: safety round/check completed  Taken 7/8/2021 1100 by Anamaria May RN  Safety Promotion/Fall Prevention: safety round/check completed  Taken 7/8/2021 1000 by Anamaria May RN  Safety Promotion/Fall Prevention: safety round/check completed  Taken 7/8/2021 0800 by Anamaria May RN  Safety Promotion/Fall Prevention: safety round/check completed     Problem: Adult Inpatient Plan of Care  Goal: Plan of Care Review  Outcome: Ongoing, Progressing  Flowsheets (Taken 7/8/2021 1605)  Outcome Summary: VSS. pt c/o of some discomfort to right hip. pt is going to surgery this afternoon with Audi. NPO today after breakfast. dressing CDI/no drainage. continue to monitor.  Goal: Patient-Specific Goal (Individualized)  Outcome: Ongoing, Progressing  Goal: Absence of Hospital-Acquired Illness or Injury  Outcome: Ongoing, Progressing  Intervention: Identify and Manage Fall Risk  Recent Flowsheet Documentation  Taken 7/8/2021 1600 by Anamaria May RN  Safety Promotion/Fall Prevention: safety round/check completed  Taken 7/8/2021 1459 by Anamaria May RN  Safety Promotion/Fall Prevention: safety round/check completed  Taken 7/8/2021 1400 by Anamaria May RN  Safety Promotion/Fall Prevention: safety round/check  completed  Taken 7/8/2021 1300 by Anamaria May RN  Safety Promotion/Fall Prevention: safety round/check completed  Taken 7/8/2021 1200 by Anamaria May RN  Safety Promotion/Fall Prevention: safety round/check completed  Taken 7/8/2021 1100 by Anamaria May RN  Safety Promotion/Fall Prevention: safety round/check completed  Taken 7/8/2021 1000 by Anamaria May RN  Safety Promotion/Fall Prevention: safety round/check completed  Taken 7/8/2021 0800 by Anamaria May RN  Safety Promotion/Fall Prevention: safety round/check completed  Goal: Optimal Comfort and Wellbeing  Outcome: Ongoing, Progressing  Goal: Readiness for Transition of Care  Outcome: Ongoing, Progressing     Problem: Wound  Goal: Optimal Wound Healing  Outcome: Ongoing, Progressing     Problem: Pain Chronic (Persistent) (Comorbidity Management)  Goal: Acceptable Pain Control and Functional Ability  Outcome: Ongoing, Progressing   Goal Outcome Evaluation:              Outcome Summary: VSS. pt c/o of some discomfort to right hip. pt is going to surgery this afternoon with Audi. NPO today after breakfast. dressing CDI/no drainage. continue to monitor.

## 2021-07-09 LAB
ANION GAP SERPL CALCULATED.3IONS-SCNC: 6 MMOL/L (ref 5–15)
BUN SERPL-MCNC: 12 MG/DL (ref 8–23)
BUN/CREAT SERPL: 16 (ref 7–25)
CALCIUM SPEC-SCNC: 8.9 MG/DL (ref 8.6–10.5)
CHLORIDE SERPL-SCNC: 104 MMOL/L (ref 98–107)
CO2 SERPL-SCNC: 32 MMOL/L (ref 22–29)
CREAT SERPL-MCNC: 0.75 MG/DL (ref 0.57–1)
GFR SERPL CREATININE-BSD FRML MDRD: 77 ML/MIN/1.73
GLUCOSE SERPL-MCNC: 92 MG/DL (ref 65–99)
HCT VFR BLD AUTO: 25.5 % (ref 34–46.6)
HGB BLD-MCNC: 8 G/DL (ref 12–15.9)
POTASSIUM SERPL-SCNC: 3.5 MMOL/L (ref 3.5–5.2)
SODIUM SERPL-SCNC: 142 MMOL/L (ref 136–145)

## 2021-07-09 PROCEDURE — 80048 BASIC METABOLIC PNL TOTAL CA: CPT | Performed by: ORTHOPAEDIC SURGERY

## 2021-07-09 PROCEDURE — 85014 HEMATOCRIT: CPT | Performed by: ORTHOPAEDIC SURGERY

## 2021-07-09 PROCEDURE — 25010000002 HYDROMORPHONE PER 4 MG: Performed by: ORTHOPAEDIC SURGERY

## 2021-07-09 PROCEDURE — 97116 GAIT TRAINING THERAPY: CPT

## 2021-07-09 PROCEDURE — 97535 SELF CARE MNGMENT TRAINING: CPT

## 2021-07-09 PROCEDURE — 25010000002 VANCOMYCIN PER 500 MG: Performed by: INTERNAL MEDICINE

## 2021-07-09 PROCEDURE — 25010000002 MEROPENEM PER 100 MG: Performed by: INTERNAL MEDICINE

## 2021-07-09 PROCEDURE — 85018 HEMOGLOBIN: CPT | Performed by: ORTHOPAEDIC SURGERY

## 2021-07-09 RX ORDER — DOCUSATE SODIUM 100 MG/1
100 CAPSULE, LIQUID FILLED ORAL 2 TIMES DAILY
Status: DISCONTINUED | OUTPATIENT
Start: 2021-07-09 | End: 2021-07-13 | Stop reason: HOSPADM

## 2021-07-09 RX ORDER — VANCOMYCIN HYDROCHLORIDE 1 G/200ML
1000 INJECTION, SOLUTION INTRAVENOUS EVERY 12 HOURS
Status: DISCONTINUED | OUTPATIENT
Start: 2021-07-09 | End: 2021-07-10

## 2021-07-09 RX ORDER — POLYETHYLENE GLYCOL 3350 17 G/17G
17 POWDER, FOR SOLUTION ORAL DAILY
Status: DISCONTINUED | OUTPATIENT
Start: 2021-07-09 | End: 2021-07-13 | Stop reason: HOSPADM

## 2021-07-09 RX ORDER — VANCOMYCIN HYDROCHLORIDE 1 G/200ML
1000 INJECTION, SOLUTION INTRAVENOUS EVERY 24 HOURS
Status: DISCONTINUED | OUTPATIENT
Start: 2021-07-09 | End: 2021-07-09 | Stop reason: DRUGHIGH

## 2021-07-09 RX ORDER — DOCUSATE SODIUM 100 MG/1
100 CAPSULE, LIQUID FILLED ORAL 2 TIMES DAILY
Status: DISCONTINUED | OUTPATIENT
Start: 2021-07-09 | End: 2021-07-09 | Stop reason: SDUPTHER

## 2021-07-09 RX ADMIN — DOCUSATE SODIUM 100 MG: 100 CAPSULE ORAL at 11:17

## 2021-07-09 RX ADMIN — NAPROXEN 375 MG: 375 TABLET ORAL at 17:27

## 2021-07-09 RX ADMIN — FUROSEMIDE 40 MG: 40 TABLET ORAL at 09:40

## 2021-07-09 RX ADMIN — SUCRALFATE 1 G: 1 TABLET ORAL at 09:39

## 2021-07-09 RX ADMIN — MEROPENEM 1 G: 1 INJECTION, POWDER, FOR SOLUTION INTRAVENOUS at 16:18

## 2021-07-09 RX ADMIN — HYDROMORPHONE HYDROCHLORIDE 0.5 MG: 1 INJECTION, SOLUTION INTRAMUSCULAR; INTRAVENOUS; SUBCUTANEOUS at 08:37

## 2021-07-09 RX ADMIN — HYDROCODONE BITARTRATE AND ACETAMINOPHEN 1 TABLET: 7.5; 325 TABLET ORAL at 04:13

## 2021-07-09 RX ADMIN — VALACYCLOVIR 500 MG: 500 TABLET, FILM COATED ORAL at 11:17

## 2021-07-09 RX ADMIN — PANTOPRAZOLE SODIUM 40 MG: 40 TABLET, DELAYED RELEASE ORAL at 09:39

## 2021-07-09 RX ADMIN — MEROPENEM 1 G: 1 INJECTION, POWDER, FOR SOLUTION INTRAVENOUS at 22:07

## 2021-07-09 RX ADMIN — SUCRALFATE 1 G: 1 TABLET ORAL at 17:27

## 2021-07-09 RX ADMIN — TRAMADOL HYDROCHLORIDE 50 MG: 50 TABLET, FILM COATED ORAL at 22:07

## 2021-07-09 RX ADMIN — APIXABAN 5 MG: 5 TABLET, FILM COATED ORAL at 22:08

## 2021-07-09 RX ADMIN — Medication 250 MG: at 09:39

## 2021-07-09 RX ADMIN — TRAMADOL HYDROCHLORIDE 50 MG: 50 TABLET, FILM COATED ORAL at 09:40

## 2021-07-09 RX ADMIN — ISOSORBIDE MONONITRATE 60 MG: 60 TABLET, EXTENDED RELEASE ORAL at 09:39

## 2021-07-09 RX ADMIN — SODIUM CHLORIDE, PRESERVATIVE FREE 10 ML: 5 INJECTION INTRAVENOUS at 22:09

## 2021-07-09 RX ADMIN — LIDOCAINE 1 PATCH: 50 PATCH CUTANEOUS at 22:08

## 2021-07-09 RX ADMIN — DOCUSATE SODIUM 100 MG: 100 CAPSULE ORAL at 22:08

## 2021-07-09 RX ADMIN — VANCOMYCIN HYDROCHLORIDE 1000 MG: 1 INJECTION, SOLUTION INTRAVENOUS at 17:27

## 2021-07-09 RX ADMIN — LEVOTHYROXINE SODIUM 75 MCG: 75 TABLET ORAL at 09:40

## 2021-07-09 RX ADMIN — Medication 400 MG: at 09:39

## 2021-07-09 RX ADMIN — HYDROCODONE BITARTRATE AND ACETAMINOPHEN 1 TABLET: 7.5; 325 TABLET ORAL at 22:07

## 2021-07-09 RX ADMIN — SUCRALFATE 1 G: 1 TABLET ORAL at 22:08

## 2021-07-09 RX ADMIN — APIXABAN 5 MG: 5 TABLET, FILM COATED ORAL at 09:40

## 2021-07-09 RX ADMIN — Medication 1 TABLET: at 09:39

## 2021-07-09 RX ADMIN — SODIUM CHLORIDE, PRESERVATIVE FREE 3 ML: 5 INJECTION INTRAVENOUS at 22:09

## 2021-07-09 RX ADMIN — CARVEDILOL 25 MG: 25 TABLET, FILM COATED ORAL at 11:18

## 2021-07-09 RX ADMIN — ISOSORBIDE MONONITRATE 60 MG: 60 TABLET, EXTENDED RELEASE ORAL at 22:08

## 2021-07-09 RX ADMIN — POLYETHYLENE GLYCOL (3350) 17 G: 17 POWDER, FOR SOLUTION ORAL at 11:17

## 2021-07-09 RX ADMIN — TRAMADOL HYDROCHLORIDE 50 MG: 50 TABLET, FILM COATED ORAL at 16:17

## 2021-07-09 RX ADMIN — NAPROXEN 375 MG: 375 TABLET ORAL at 09:39

## 2021-07-09 RX ADMIN — HYDROCODONE BITARTRATE AND ACETAMINOPHEN 2 TABLET: 7.5; 325 TABLET ORAL at 10:26

## 2021-07-09 RX ADMIN — POTASSIUM CHLORIDE 20 MEQ: 750 CAPSULE, EXTENDED RELEASE ORAL at 11:17

## 2021-07-09 RX ADMIN — GABAPENTIN 300 MG: 300 CAPSULE ORAL at 22:07

## 2021-07-09 RX ADMIN — HYDROCODONE BITARTRATE AND ACETAMINOPHEN 2 TABLET: 7.5; 325 TABLET ORAL at 14:49

## 2021-07-09 NOTE — NURSING NOTE
Jair from [facility] checked pacemaker interrogation report and said it was OK. Report placed on chart

## 2021-07-09 NOTE — PLAN OF CARE
Plan of Care Reviewed With: patient        Progress: improving  Outcome Summary: OT tx completed. Pt alert and oriented x4 and agreeable to OT. Pt performes sup <> sit mod I. Pt performed sit <> stand, toilet t/f, and functionally ambulated with CGA and RW. Pt donned underwear with min A. Pt completed toileting with CGA. Pt washed pereneal area with CGA. Pt would benefit from continued OT to address balance, cognition, activity tolerance and strength in order to improve safety and independence with ADL tasks. Recommend d/c SNF.

## 2021-07-09 NOTE — ANESTHESIA POSTPROCEDURE EVALUATION
"Patient: Tawny Shni    Procedure Summary     Date: 07/08/21 Room / Location: [facility] OR [address] [place] [address] PAD OR    Anesthesia Start: 1827 Anesthesia Stop: 1916    Procedure: INCISION AND DRAINAGE WOUND RIGHT HIP (Right ) Diagnosis:       Chronic infection of right hip on antibiotics (CMS/HCC)      (chronic R hip wound)    Surgeons: James Huntley MD Provider: Andres Daniels CRNA    Anesthesia Type: general ASA Status: 4 - Emergent          Anesthesia Type: general    Vitals  Vitals Value Taken Time   /59 07/08/21 2015   Temp 97.2 °F (36.2 °C) 07/08/21 2015   Pulse 61 07/08/21 2015   Resp 14 07/08/21 2015   SpO2 99 % 07/08/21 2015           Post Anesthesia Care and Evaluation    Patient location during evaluation: PACU  Patient participation: complete - patient participated  Level of consciousness: awake and alert  Pain management: adequate  Airway patency: patent  Anesthetic complications: No anesthetic complications    Cardiovascular status: acceptable  Respiratory status: acceptable  Hydration status: acceptable    Comments: Blood pressure 121/56, pulse 60, temperature 97.5 °F (36.4 °C), temperature source Temporal, resp. rate 18, height 167.6 cm (65.98\"), weight 89.9 kg (198 lb 1.6 oz), SpO2 99 %, not currently breastfeeding.    Pt discharged from PACU based on tyler score >8      "

## 2021-07-09 NOTE — PROGRESS NOTES
[facility] Medicine Services  INPATIENT PROGRESS NOTE    Patient Name: Tawny Shin  Date of Admission: 7/6/2021  Today's Date: 07/09/21  Length of Stay: 3  Primary Care Physician: Davon Urrutia MD    Subjective   Chief Complaint: f/u   HPI   Patient is off  Abx.  In fact abx wasn't continued from ER pending culture.  ID consulted for recommendation as she had seen by specialist in the past.   She had I&D with culture yesterday.  afebrile  Remains hemodynamically stable        Review of Systems     All pertinent negatives and positives are as above. All other systems have been reviewed and are negative unless otherwise stated.     Objective    Temp:  [97 °F (36.1 °C)-98 °F (36.7 °C)] 97.7 °F (36.5 °C)  Heart Rate:  [59-67] 67  Resp:  [11-20] 18  BP: ()/(41-71) 115/53  Physical Exam  Seated comfortably on recliner.  No distress  She is neurologically intact.   She is interactive and appropriate in affect  Anicteric sclera, glassy appearance of left pupil  Supple neck  No thyromegaly  Moist oral mucosa  Lungs are clear to auscultation with good air exchange  S1-S2, regular rate and rhythm  Soft abdomen, nontender, no organomegaly  No cyanosis  Positive pitting edema      Results Review:  I have reviewed the labs, radiology results, and diagnostic studies.    Laboratory Data:   Results from last 7 days   Lab Units 07/09/21  0516 07/07/21  0550 07/06/21 1946   WBC 10*3/mm3  --  4.23 5.16   HEMOGLOBIN g/dL 8.0* 8.5* 9.7*   HEMATOCRIT % 25.5* 27.0* 31.5*   PLATELETS 10*3/mm3  --  238 297        Results from last 7 days   Lab Units 07/09/21  0516 07/07/21  0550 07/06/21 1946   SODIUM mmol/L 142 141 139   POTASSIUM mmol/L 3.5 3.6 3.6   CHLORIDE mmol/L 104 103 100   CO2 mmol/L 32.0* 31.0* 29.0   BUN mg/dL 12 10 11   CREATININE mg/dL 0.75 0.77 0.90   CALCIUM mg/dL 8.9 8.6 9.2   BILIRUBIN mg/dL  --  0.2  --    ALK PHOS U/L  --  99  --    ALT (SGPT) U/L  --  6  --    AST (SGOT)  U/L  --  16  --    GLUCOSE mg/dL 92 88 136*       Culture Data:   Blood Culture   Date Value Ref Range Status   07/06/2021 No growth at 2 days  Preliminary   07/06/2021 No growth at 2 days  Preliminary     Urine Culture   Date Value Ref Range Status   07/06/2021 >100,000 CFU/mL Mixed Selina Isolated  Final       Radiology Data:   Imaging Results (Last 24 Hours)     ** No results found for the last 24 hours. **          I have reviewed the patient's current medications.     Assessment/Plan     Active Hospital Problems    Diagnosis    • **Osteomyelitis of right femur (CMS/HCC)    • Transient alteration of awareness    • Non-healing surgical wound, right hip    • Chronic deep vein thrombosis (DVT) of right lower extremity (CMS/HCC)    • Chronic pain syndrome        Problem list  Chronic wound right hip with concern for chronic/(?)Treated osteomyelitis-normally has a wound VAC, home health following.  Recent history of DVT (June 2021) with prior history of it (confirmed with son it was prior superficial vein thrombosis).    Fall, recurrent -because of accidental injury at home  Chronic pain syndrome  Bilateral lower extremity edema  Hypertension    discussion/plan  Status post I&D and bone culture of chronic right hip wound on July 7 by Dr. Huntley.  ID following  Plastic surgery referral.  Timing to be discussed with ID and Ortho  Patient is off antibiotics.  I will defer to ID if any need for current coverage  PT OT  Continue diuresis; electrolyte and renal function are okay  Continue present management    apixaban, 5 mg, Oral, Q12H  carvedilol, 25 mg, Oral, BID With Meals  fluticasone, 1 spray, Nasal, Daily  furosemide, 40 mg, Oral, Daily  gabapentin, 300 mg, Oral, Nightly  isosorbide mononitrate, 60 mg, Oral, BID  levothyroxine, 75 mcg, Oral, Q AM  lidocaine, 1 patch, Transdermal, Nightly  magnesium oxide, 400 mg, Oral, Daily  multivitamin with minerals, 1 tablet, Oral, Daily  naproxen, 375 mg, Oral, BID With  Meals  pantoprazole, 40 mg, Oral, Daily  potassium chloride, 20 mEq, Oral, Daily  saccharomyces boulardii, 250 mg, Oral, Daily  sodium chloride, 10 mL, Intravenous, Q12H  sodium chloride, 3 mL, Intravenous, Q12H  sucralfate, 1 g, Oral, 4x Daily AC & at Bedtime  traMADol, 50 mg, Oral, TID  valACYclovir, 500 mg, Oral, Q24H              Discharge Planning: To be determined  Electronically signed by Jun Hawley MD, 07/09/21, 09:03 CDT.

## 2021-07-09 NOTE — THERAPY TREATMENT NOTE
Patient Name: Tawny Shin  : 1955    MRN: [id number]                              Today's Date: 2021       Admit Date: 2021    Visit Dx:     ICD-10-CM ICD-9-CM   1. Osteomyelitis of right femur, unspecified type (CMS/HCC)  M86.9 730.25   2. Altered mental status, unspecified altered mental status type  R41.82 780.97   3. Decreased activities of daily living (ADL)  Z78.9 V49.89   4. Decreased functional activity tolerance  R68.89 780.99   5. Osteomyelitis (CMS/HCC)  M86.9 730.20     Patient Active Problem List   Diagnosis   • Eustachian tube dysfunction   • Sensorineural hearing loss   • Spondylolisthesis of lumbar region   • Coronary artery disease   • Hyperlipidemia   • Hypertension   • Myalgia due to statin   • S/P coronary artery stent placement   • Pacemaker   • Seropositive rheumatoid arthritis of multiple sites (CMS/[facility])   • Sick sinus syndrome (CMS/[facility])   • Age-related osteoporosis with current pathological fracture   • Allergic-infective asthma   • Arthritis of both knees   • Vasovagal syncope   • Bilateral bunions   • Bronchitis   • Cardiac pacemaker syndrome   • Charcot's joint of foot, left   • Constipation   • 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   • Intrinsic asthma   • Left carotid bruit   • Neutropenia (CMS/[facility])   • Primary osteoarthritis of left knee   • Psoriasis vulgaris   • Recurrent acute serous otitis media of both ears   • Thrombocytopenia (CMS/HCC)   • Hypothyroidism   • Vitamin D deficiency   • Myxedema heart disease   • Syncope, cardiogenic   • Rupture of gluteus minimus tendon   • Muscle tear   • Syncope, recurrent   • Leukocytosis   • Headache   • Elevated CPK   • Staphylococcus aureus bacteremia   • Right hip pain, suspected infection   • Obesity (BMI 30-39.9)   • Stenosis of cervical spine with myelopathy (CMS/HCC)   • Spinal stenosis, lumbar region, with neurogenic claudication   • Osteomyelitis of lumbar spine (CMS/HCC)   • Iliopsoas abscess (CMS/HCC)   • Closed wedge  compression fracture of T12 vertebra (CMS/[facility])   • Concussion with no loss of consciousness   • Fall as cause of accidental injury at home as place of occurrence   • Chronic pain syndrome   • Hypokalemia   • Bilateral lower extremity edema   • Chronic deep vein thrombosis (DVT) of right lower extremity (CMS/HCC)   • Closed fracture of ankle   • Closed fracture of lateral malleolus   • Closed fracture of shaft of metatarsal bone   • Osteomyelitis of right femur (CMS/[facility])   • Transient alteration of awareness   • Non-healing surgical wound, right hip     Past Medical History:   Diagnosis Date   • Arthritis    • Asthma    • Chronic sinusitis    • Coronary artery disease    • Eustachian tube dysfunction    • Heart disease    • Herpes simplex    • Hyperlipidemia    • Hypertension    • Knee dislocation    • Labral tear of right hip joint    • Laryngitis sicca    • Laryngitis, chronic    • MI (myocardial infarction) (CMS/[facility])    • Otorrhea    • Sensorineural hearing loss    • Sjogren's disease (CMS/[facility])      Past Surgical History:   Procedure Laterality Date   • A-V CARDIAC PACEMAKER INSERTION  2016   • ATRIAL CARDIAC PACEMAKER INSERTION     • CARDIAC CATHETERIZATION     • CATARACT EXTRACTION     • CERVICAL CORPECTOMY N/A 3/3/2021    Procedure: CERVICAL 6 CORPECTOMY WITH TITANIUM CAGE WITH NEURO MONITORING;  Surgeon: Bandar Shea MD;  Location: [facility] OR;  Service: Neurosurgery;  Laterality: N/A;   • COLONOSCOPY  11/08/2011    One fold in the ascending colon which showed ulcer otherwise normal exam   • COLONOSCOPY  11/12/2004    Normal exam repeat in five years   • CORONARY ANGIOPLASTY WITH STENT PLACEMENT      X 2; 2013 & 2014   • ENDOSCOPY  07/10/2014    Normal exam   • HIP ABDUCTION TENOTOMY BILATERAL Right 1/14/2021    Procedure: RIGHT HIP GLUTEUS MEDLUS / MINIMUS REPAIR, POSSIBLE ACHILLES ALLOGRAFT;  Surgeon: Nino Carlson MD;  Location:  [facility] OR;  Service: Orthopedics;  Laterality: Right;   • INCISION AND  DRAINAGE HIP Right 2/9/2021    Procedure: HIP INCISION AND DRAINAGE;  Surgeon: Nino Carlson MD;  Location:  [facility] OR;  Service: Orthopedics;  Laterality: Right;   • INCISION AND DRAINAGE OF WOUND Right 7/8/2021    Procedure: INCISION AND DRAINAGE WOUND RIGHT HIP;  Surgeon: James Huntley MD;  Location:  [facility] OR;  Service: Orthopedics;  Laterality: Right;   • JOINT REPLACEMENT     • LUMBAR DISCECTOMY Right 3/23/2021    Procedure: LUMBAR DISCECTOMY MICRO, Lumbar 1/2 right;  Surgeon: Bandar Shea MD;  Location:  [facility] OR;  Service: Neurosurgery;  Laterality: Right;   • LUMBAR FUSION N/A 1/19/2018    Procedure: L3-4,L4-5 DECOMPRESSION, POSTERIOR SPINAL FUSION WITH INSTRUMENTATION;  Surgeon: Fortino Oropeza MD;  Location:  [facility] OR;  Service:    • LUMBAR LAMINECTOMY WITH FUSION Left 1/17/2018    Procedure: LEFT L3-4 L4-5 LATERAL LUMBAR INTERBODY FUSION;  Surgeon: Fortino Oropeza MD;  Location: [facility] OR;  Service:    • MYRINGOTOMY W/ TUBES  09/04/2014    TUBES NO LONGER IN PLACE   • OTHER SURGICAL HISTORY      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 so hardware was removed and spacers were placed   • REPLACEMENT TOTAL KNEE Right      General Information     Row Name 07/09/21 0754          OT Time and Intention    Document Type  therapy note (daily note)  -AC (r) CE (t) AC (c)     Mode of Treatment  occupational therapy  -AC (r) CE (t) AC (c)     Row Name 07/09/21 0754          General Information    Patient Profile Reviewed  yes  -AC (r) CE (t) AC (c)     Existing Precautions/Restrictions  fall  -AC (r) CE (t) AC (c)     Row Name 07/09/21 0754          Cognition    Orientation Status (Cognition)  oriented x 4  -AC (r) CE (t) AC (c)     Row Name 07/09/21 0754          Safety Issues, Functional Mobility    Impairments Affecting Function (Mobility)  balance;cognition;endurance/activity tolerance;strength  -AC (r) CE (t) AC (c)       User Key  (r) = Recorded By, (t) = Taken By, (c) = Cosigned By    Initials  Name Provider Type    AC Sebastián Howe, OTR/L, CNT Occupational Therapist    CE Vida Weaver, OT Student OT Student          Mobility/ADL's     Row Name 07/09/21 0754          Bed Mobility    Bed Mobility  supine-sit  -AC (r) CE (t) AC (c)     Supine-Sit [place] (Bed Mobility)  modified independence  -AC (r) CE (t) AC (c)     Assistive Device (Bed Mobility)  bed rails;head of bed elevated  -AC (r) CE (t) AC (c)     Row Name 07/09/21 0754          Transfers    Transfers  sit-stand transfer;toilet transfer  -AC (r) CE (t) AC (c)     Sit-Stand [place] (Transfers)  verbal cues;contact guard x2 reps  -AC (r) CE (t) AC (c)     [place] Level (Toilet Transfer)  contact guard  -AC (r) CE (t) AC (c)     Assistive Device (Toilet Transfer)  commode;grab bars/safety frame;walker, front-wheeled  -AC (r) CE (t) AC (c)     Row Name 07/09/21 [facility]          Sit-Stand Transfer    Assistive Device (Sit-Stand Transfers)  walker, front-wheeled  -AC (r) CE (t) AC (c)     Row Name 07/09/21 [facility]          Toilet Transfer    Type (Toilet Transfer)  sit-stand;stand-sit  -AC (r) CE (t) AC (c)     Row Name 07/09/21 [facility]          Functional Mobility    Functional Mobility- Ind. Level  contact guard assist  -AC (r) CE (t) AC (c)     Functional Mobility- Device  rolling walker  -AC (r) CE (t) AC (c)     Functional Mobility- Comment  to bathroom and chair  -AC (r) CE (t) AC (c)     Row Name 07/09/21 0754          Activities of Daily Living    BADL Assessment/Intervention  lower body dressing;toileting;bathing  -AC (r) CE (t) AC (c)     Row Name 07/09/21 [facility]          Lower Body Dressing Assessment/Training    [place] Level (Lower Body Dressing)  don;pants/bottoms;minimum assist (75% patient effort)  -AC (r) CE (t) AC (c)     Position (Lower Body Dressing)  edge of bed sitting;supported standing  -AC (r) CE (t) AC (c)     Row Name 07/09/21 0754          Toileting Assessment/Training    [place] Level (Toileting)   toileting skills;adjust/manage clothing;perform perineal hygiene;contact guard assist  -AC (r) CE (t) AC (c)     Assistive Devices (Toileting)  commode  -AC (r) CE (t) AC (c)     Position (Toileting)  supported sitting;supported standing grab bars and RW  -AC (r) CE (t) AC (c)     Row Name 07/09/21 0754          Bathing Assessment/Intervention    [place] Level (Bathing)  perineal area;contact guard assist  -AC (r) CE (t) AC (c)     Position (Bathing)  supported standing RW  -AC (r) CE (t) AC (c)       User Key  (r) = Recorded By, (t) = Taken By, (c) = Cosigned By    Initials Name Provider Type    Sebastián Catherine, OTR/L, MARI Occupational Therapist    Vida Souza, OT Student OT Student        Obj/Interventions     Row Name 07/09/21 0754          Motor Skills    Motor Skills  functional endurance  -AC (r) CE (t) AC (c)     Functional Endurance  ambulated around room, completed ADLs, multiple t/f  -AC (r) CE (t) AC (c)     Row Name 07/09/21 [facility]          Balance    Balance Assessment  sitting static balance;sitting dynamic balance;standing static balance;standing dynamic balance  -AC (r) CE (t) AC (c)     Static Sitting Balance  WFL;unsupported;sitting, edge of bed  -AC (r) CE (t) AC (c)     Dynamic Sitting Balance  WFL;unsupported;sitting, edge of bed  -AC (r) CE (t) AC (c)     Static Standing Balance  WFL;supported;standing RW  -AC (r) CE (t) AC (c)     Dynamic Standing Balance  mild impairment;supported;standing RW  -AC (r) CE (t) AC (c)     Balance Interventions  sitting;standing;static;dynamic;moderate challenge;occupation based/functional task  -AC (r) CE (t) AC (c)       User Key  (r) = Recorded By, (t) = Taken By, (c) = Cosigned By    Initials Name Provider Type    Sebastián Catherine, OTR/L, CNT Occupational Therapist    Vida Souza, OT Student OT Student        Goals/Plan    No documentation.       Clinical Impression     Row Name 07/09/21 0754          Pain Assessment    Additional  Documentation  Pain Scale: Numbers Pre/Post-Treatment (Group)  -AC (r) CE (t) AC (c)     Row Name 07/09/21 0754          Pain Scale: Numbers Pre/Post-Treatment    Pretreatment Pain Rating  1/10  -AC (r) CE (t) AC (c)     Posttreatment Pain Rating  2/10  -AC (r) CE (t) AC (c)     Pain Location - Side  Right  -AC (r) CE (t) AC (c)     Pain Location  hip  -AC (r) CE (t) AC (c)     Pain Intervention(s)  Repositioned;Ambulation/increased activity  -AC (r) CE (t) AC (c)     Row Name 07/09/21 0754          Plan of Care Review    Plan of Care Reviewed With  patient  -AC (r) CE (t) AC (c)     Progress  improving  -AC (r) CE (t) AC (c)     Outcome Summary  OT tx completed. Pt alert and oriented x4 and agreeable to OT. Pt performes sup <> sit mod I. Pt performed sit <> stand, toilet t/f, and functionally ambulated with CGA and RW. Pt donned underwear with min A. Pt completed toileting with CGA. Pt washed pereneal area with CGA. Pt would benefit from continued OT to address balance, cognition, activity tolerance and strength in order to improve safety and independence with ADL tasks. Recommend d/c SNF.  -AC (r) CE (t) AC (c)     Row Name 07/09/21 0754          Therapy Assessment/Plan (OT)    Patient/Family Therapy Goal Statement (OT)  not stated at this time  -AC (r) CE (t) AC (c)     Predicted Duration of Therapy Intervention (OT)  until d/c  -AC (r) CE (t) AC (c)     Row Name 07/09/21 0757          Therapy Plan Review/Discharge Plan (OT)    Anticipated Discharge Disposition (OT)  skilled nursing facility  -AC (r) CE (t) AC (c)     Row Name 07/09/21 0754          Positioning and Restraints    Pre-Treatment Position  in bed  -AC (r) CE (t) AC (c)     Post Treatment Position  chair  -AC (r) CE (t) AC (c)     In Chair  sitting;call light within reach;encouraged to call for assist;exit alarm on;legs elevated;with other staff  -AC (r) CE (t) AC (c)       User Key  (r) = Recorded By, (t) = Taken By, (c) = Cosigned By    Initials  Name Provider Type    TABITHA Sebastián Howe, OTR/L, MARI Occupational Therapist    Vida Souza, OT Student OT Student        Outcome Measures     Row Name 07/09/21 0754          How much help from another is currently needed...    Putting on and taking off regular lower body clothing?  3  -AC (r) CE (t) AC (c)     Bathing (including washing, rinsing, and drying)  2  -AC (r) CE (t) AC (c)     Toileting (which includes using toilet bed pan or urinal)  3  -AC (r) CE (t) AC (c)     Putting on and taking off regular upper body clothing  4  -AC (r) CE (t) AC (c)     Taking care of personal grooming (such as brushing teeth)  4  -AC (r) CE (t) AC (c)     Eating meals  4  -AC (r) CE (t) AC (c)     AM-PAC 6 Clicks Score (OT)  20  -AC (r) CE (t)     Row Name 07/09/21 0754          Functional Assessment    Outcome Measure Options  AM-PAC 6 Clicks Daily Activity (OT)  -AC (r) CE (t) AC (c)       User Key  (r) = Recorded By, (t) = Taken By, (c) = Cosigned By    Initials Name Provider Type    TABITHA Sebastián Howe, OTR/L, MARI Occupational Therapist    Vida Souza, OT Student OT Student        Occupational Therapy Education                 Title: PT OT SLP Therapies (In Progress)     Topic: Occupational Therapy (Done)     Point: ADL training (Done)     Description:   Instruct learner(s) on proper safety adaptation and remediation techniques during self care or transfers.   Instruct in proper use of assistive devices.              Learning Progress Summary           Patient Acceptance, E,TB, VU by  at 7/9/2021 1048    Comment: pt educated on body positioning during ADL tasks, maintaining safety with ADL tasks here and at home, benefits of OOB activity    Acceptance, E, VU,NR by  at 7/7/2021 1442                   Point: Home exercise program (Done)     Description:   Instruct learner(s) on appropriate technique for monitoring, assisting and/or progressing therapeutic exercises/activities.              Learning Progress  Summary           Patient Acceptance, E,TB, VU by CE at 7/9/2021 1048    Comment: pt educated on body positioning during ADL tasks, maintaining safety with ADL tasks here and at home, benefits of OOB activity    Acceptance, E, VU,NR by  at 7/7/2021 1442                   Point: Body mechanics (Done)     Description:   Instruct learner(s) on proper positioning and spine alignment during self-care, functional mobility activities and/or exercises.              Learning Progress Summary           Patient Acceptance, E,TB, VU by  at 7/9/2021 1048    Comment: pt educated on body positioning during ADL tasks, maintaining safety with ADL tasks here and at home, benefits of OOB activity    Acceptance, E, VU,NR by  at 7/7/2021 1442                               User Key     Initials Effective Dates Name Provider Type Discipline     04/09/19 -  Sebastián Howe, OTR/L, CNT Occupational Therapist OT     05/10/21 -  Vida Weaver OT Student OT Student OT              OT Recommendation and Plan     Plan of Care Review  Plan of Care Reviewed With: patient  Progress: improving  Outcome Summary: OT tx completed. Pt alert and oriented x4 and agreeable to OT. Pt performes sup <> sit mod I. Pt performed sit <> stand, toilet t/f, and functionally ambulated with CGA and RW. Pt donned underwear with min A. Pt completed toileting with CGA. Pt washed pereneal area with CGA. Pt would benefit from continued OT to address balance, cognition, activity tolerance and strength in order to improve safety and independence with ADL tasks. Recommend d/c SNF.     Time Calculation:   Time Calculation- OT     Row Name 07/09/21 1359 07/09/21 1035 07/09/21 0754       Time Calculation- OT    OT Start Time  --  --  0754  -AC (r) CE (t) AC (c)    OT Stop Time  --  --  0825  -AC (r) CE (t) AC (c)    OT Time Calculation (min)  --  --  31 min  -AC (r) CE (t)    Total Timed Code Minutes- OT  --  --  31 minute(s)  -AC (r) CE (t) AC (c)    OT Received  On  --  --  07/09/21  -TABITHA (r) CE (t) AC (c)       Timed Charges    33363 - Gait Training Minutes   23  -KINGS  24  -KINGS  --       Total Minutes    Timed Charges Total Minutes  23  -KINGS  24  -KINGS  --     Total Minutes  23  -KINGS  24  -KINGS  --      User Key  (r) = Recorded By, (t) = Taken By, (c) = Cosigned By    Initials Name Provider Type    Sebastián Catherine, OTR/L, CNT Occupational Therapist    Garcia Jaramillo, PTA Physical Therapy Assistant    Vida Souza, OT Student OT Student                 VIDA TAYLOR, OT Student  7/9/2021

## 2021-07-09 NOTE — PLAN OF CARE
Goal Outcome Evaluation:  Plan of Care Reviewed With: patient        Progress: no change  Outcome Summary: A & O, c/o mild pain to back and R hip, prn percocet given with relief, moderate drainage to R hip, dressing reinforced, moderate edema to bilateral lower extremities, states numbness to toes, dorsal pedal pulses WNL, can turn self, eloquis to restart this am,

## 2021-07-09 NOTE — PROGRESS NOTES
"Pharmacy Dosing Service  Pharmacokinetics  Vancomycin Initial Evaluation  Assessment/Action/Plan:  Loading dose: n/a  Current Order: Vancomycin 1000 mg IVPB every 12 hours for 7 days  Current end date:07/16/21  Levels: ordered prior to 3rd dose 07/10/21 at 1500.  Additional antimicrobial agent(s): Meropenem    Vancomycin dosage initiated based on population pharmacokinetic parameters. Pharmacy will continue to follow daily and adjust dose accordingly.     Subjective:  Tawny Shin is a 65 y.o. female with a Vancomycin \"Pharmacy to Dose\" consult for the treatment of SSTI, day 1 of 7 of treatment.    AUC Model Data:  Loading dose: N/A  Regimen: 1000 mg IV every 12 hours.  Start time: 02:51 on 07/10/2021  Exposure target: AUC24 (range)400-600 mg/L.hr   AUC24,ss: 490 mg/L.hr  PAUC*: 72 %  Ctrough,ss: 15.8 mg/L  Pconc*: 29 %      Objective:  Ht: 167.6 cm (65.98\"); Wt: 89.9 kg (198 lb 1.6 oz)  Estimated Creatinine Clearance: 77 mL/min (by C-G formula based on SCr of 0.75 mg/dL).   Creatinine   Date Value Ref Range Status   07/09/2021 0.75 0.57 - 1.00 mg/dL Final   07/07/2021 0.77 0.57 - 1.00 mg/dL Final   07/06/2021 0.90 0.57 - 1.00 mg/dL Final   07/08/2020 0.90 0.60 - 1.30 mg/dL Final     Comment:     Serial Number: [id number]:  [id number]   09/03/2019 0.7 0.5 - 0.9 mg/dL Final   01/14/2019 0.5 0.5 - 0.9 mg/dL Final   01/07/2019 0.6 0.5 - 0.9 mg/dL Final      Lab Results   Component Value Date    WBC 4.23 07/07/2021    WBC 5.16 07/06/2021    WBC 5.16 06/23/2021      Baseline culture results:  Microbiology Results (last 10 days)       Procedure Component Value - Date/Time    Tissue / Bone Culture - Bone, Hip, Right [[id number]] Collected: 07/08/21 1859    Lab Status: Preliminary result Specimen: Bone from Hip, Right Updated: 07/09/21 1211     Tissue Culture No growth     Gram Stain Few (2+) WBCs seen      No organisms seen    Tissue / Bone Culture - Tissue, Hip, Right [[id number]] Collected: 07/08/21 1858    Lab Status: " Preliminary result Specimen: Tissue from Hip, Right Updated: 07/09/21 1213     Tissue Culture Growth present, too young to evaluate     Gram Stain Moderate (3+) WBCs seen      Few (2+) Gram negative bacilli    Blood Culture - Blood, Arm, Right [[id number]] Collected: 07/06/21 2052    Lab Status: Preliminary result Specimen: Blood from Arm, Right Updated: 07/08/21 2131     Blood Culture No growth at 2 days    COVID PRE-OP / PRE-PROCEDURE SCREENING ORDER (NO ISOLATION) - Swab, Nasal Cavity [[id number]]  (Normal) Collected: 07/06/21 2052    Lab Status: Final result Specimen: Swab from Nasal Cavity Updated: 07/06/21 2140    Narrative:      The following orders were created for panel order COVID PRE-OP / PRE-PROCEDURE SCREENING ORDER (NO ISOLATION) - Swab, Nasal Cavity.  Procedure                               Abnormality         Status                     ---------                               -----------         ------                     COVID-19,[facility]...[[id number]]  Normal              Final result                 Please view results for these tests on the individual orders.    COVID-19,[facility]HOUSE,Nasal Swab No Transport Media 3-4 HR TAT - Swab, Nasal Cavity [[id number]]  (Normal) Collected: 07/06/21 2052    Lab Status: Final result Specimen: Swab from Nasal Cavity Updated: 07/06/21 2140     COVID19 Not Detected    Narrative:      Fact sheet for providers: https://www.fda.gov/media/872718/download     Fact sheet for patients: https://www.fda.gov/media/173241/download    Test performed by PCR.    Consider negative results in combination with clinical observations, patient history, and epidemiological information.    Urine Culture - Urine, Urine, Clean Catch [[id number]] Collected: 07/06/21 2040    Lab Status: Final result Specimen: Urine, Clean Catch Updated: 07/08/21 1109     Urine Culture >100,000 CFU/mL Mixed Selina Isolated    Narrative:      Specimen contains mixed organisms of questionable  pathogenicity which indicates contamination with commensal brett.  Further identification is unlikely to provide clinically useful information.  Suggest recollection.    Blood Culture - Blood, Arm, Right [[id number]] Collected: 07/06/21 1946    Lab Status: Preliminary result Specimen: Blood from Arm, Right Updated: 07/08/21 2000     Blood Culture No growth at 2 days            Navya Bernal, PharmD  07/09/21 14:56 CDT

## 2021-07-09 NOTE — PLAN OF CARE
Goal Outcome Evaluation:  Plan of Care Reviewed With: (P) patient           Outcome Summary: (P) Ntn assessment per wound protocol. pt reports fair PO intake of meals today. Per RN notes, PO intake 50% x 1 meal. pt c/o poor appetite x 4 months s/p glut and hip Sx. pt reports 20# weight loss x 3-4 months. per weight reports, weight changes difficult to assess r/t edema. Pt reports no BM since admit, denies n/v. Ordered chocolate Boost Plus BID per wound protein-calorie needs. Will cont to follow for PO meal/supplement intake and malnutrition concerns.

## 2021-07-09 NOTE — PLAN OF CARE
Goal Outcome Evaluation:              Outcome Summary: Patient is alert and oriented X 4. Patient is up with standby assist. Dressing is clean dry and intact on the right hip. Patient was medicated with PO pain medication for this shift. Patient was started on vancomycin and merrem during this shift. IVF discontinued. Will continue to monitor for remainder of shift. Safety maintained. PT/OT.

## 2021-07-09 NOTE — CASE MANAGEMENT/SOCIAL WORK
Continued Stay Note  [facility]     Patient Name: Tawny Shin  MRN: [id number]  Today's Date: 7/9/2021    Admit Date: 7/6/2021    Discharge Plan     Row Name 07/09/21 0826       Plan    Plan Comments  Roxy, admissions from [facility], states that facility is able to accept pt at discharge. Dr Shin aware. SW will follow   Phone: [phone]         Fax: [phone]                Discharge Codes    No documentation.             Bianca Barragan

## 2021-07-09 NOTE — PROGRESS NOTES
Orthopaedic Surgery Progress Note      7/9/2021   08:04 CDT    Name:  Tawny Shin  MRN:    [id number]     Acct:     [id number]   Room:  [address] [place] [address] Day: 3     Admit Date: 7/6/2021  PCP: Davon Urrutia MD        Subjective:     C/C: POD 1 Day Post-Op INCISION AND DRAINAGE WOUND RIGHT HIP (Right)     Interval History: Status: improved and remained stable. Per Dr. Bashir op note, able to get good deep cultures from wound. No new complaints this AM. Patient sitting up in bed comfortably. Pain well controlled.       Medications:     Allergies:   Allergies   Allergen Reactions   • Atorvastatin Other (See Comments)     LEG CRAMPS     • Amoxicillin Rash   • Escitalopram Rash   • Nabumetone Rash   • Niacin Er Rash   • Penicillins Rash   • Simvastatin Rash       Current Meds:   Current Facility-Administered Medications   Medication Dose Route Frequency Provider Last Rate Last Admin   • acetaminophen (TYLENOL) tablet 650 mg  650 mg Oral Q4H PRN James Huntley MD        Or   • acetaminophen (TYLENOL) suppository 650 mg  650 mg Rectal Q4H PRN James Huntley MD       • apixaban (ELIQUIS) tablet 5 mg  5 mg Oral Q12H James Huntley MD       • carvedilol (COREG) tablet 25 mg  25 mg Oral BID With Meals James Huntley MD   25 mg at 07/08/21 2200   • docusate sodium (COLACE) capsule 100 mg  100 mg Oral BID PRN James Huntley MD       • fluticasone (FLONASE) 50 MCG/ACT nasal spray 1 spray  1 spray Nasal Daily James Huntley MD   1 spray at 07/08/21 0956   • furosemide (LASIX) tablet 40 mg  40 mg Oral Daily James Huntley MD   40 mg at 07/08/21 0955   • gabapentin (NEURONTIN) capsule 300 mg  300 mg Oral Nightly James Huntley MD   300 mg at 07/08/21 2200   • HYDROcodone-acetaminophen (NORCO) 7.5-325 MG per tablet 1 tablet  1 tablet Oral Q4H PRN James Huntley MD   1 tablet at 07/09/21 0413   • HYDROcodone-acetaminophen (NORCO) 7.5-325 MG per tablet 2 tablet   2 tablet Oral Q4H PRN James Huntley MD       • HYDROmorphone (DILAUDID) injection 0.5 mg  0.5 mg Intravenous Q2H PRN James Huntley MD        And   • naloxone (NARCAN) injection 0.1 mg  0.1 mg Intravenous Q5 Min PRN James Huntley MD       • isosorbide mononitrate (IMDUR) 24 hr tablet 60 mg  60 mg Oral BID James Huntley MD   60 mg at 07/08/21 0954   • lactated ringers infusion  100 mL/hr Intravenous Continuous James Huntley  mL/hr at 07/08/21 2327 100 mL/hr at 07/08/21 2327   • levothyroxine (SYNTHROID, LEVOTHROID) tablet 75 mcg  75 mcg Oral Q AM James Huntley MD   75 mcg at 07/08/21 0955   • lidocaine (LIDODERM) 5 % 1 patch  1 patch Transdermal Nightly James Huntley MD   1 patch at 07/08/21 2200   • magnesium hydroxide (MILK OF MAGNESIA) suspension 2400 mg/10mL 10 mL  10 mL Oral Daily PRN James Huntley MD       • magnesium oxide (MAG-OX) tablet 400 mg  400 mg Oral Daily James Huntley MD   400 mg at 07/08/21 0955   • morphine injection 4 mg  4 mg Intravenous Q2H PRN James Huntley MD        And   • naloxone (NARCAN) injection 0.4 mg  0.4 mg Intravenous Q5 Min PRN James Huntley MD       • multivitamin with minerals 1 tablet  1 tablet Oral Daily James Huntley MD   1 tablet at 07/08/21 0954   • naproxen (NAPROSYN) tablet 375 mg  375 mg Oral BID With Meals James Huntley MD   375 mg at 07/08/21 0953   • nitroglycerin (NITROSTAT) SL tablet 0.4 mg  0.4 mg Sublingual Q5 Min PRN James Huntley MD       • ondansetron (ZOFRAN) tablet 4 mg  4 mg Oral Q6H PRN James Huntley MD        Or   • ondansetron (ZOFRAN) injection 4 mg  4 mg Intravenous Q6H PRN James Huntley MD       • pantoprazole (PROTONIX) EC tablet 40 mg  40 mg Oral Daily James Huntley MD   40 mg at 07/08/21 0955   • phenol (CHLORASEPTIC) 1.4 % liquid 2 spray  2 spray Mouth/Throat Q2H PRN James Huntley MD        • potassium chloride (MICRO-K) CR capsule 20 mEq  20 mEq Oral Daily James Huntley MD   20 mEq at 07/08/21 0953   • promethazine (PHENERGAN) tablet 12.5 mg  12.5 mg Oral Q6H PRN James Huntley MD        Or   • promethazine (PHENERGAN) suppository 12.5 mg  12.5 mg Rectal Q6H PRN James Huntley MD       • saccharomyces boulardii (FLORASTOR) capsule 250 mg  250 mg Oral Daily James Huntley MD   250 mg at 07/08/21 0954   • sodium chloride 0.9 % flush 1-10 mL  1-10 mL Intravenous PRN James Huntley MD       • sodium chloride 0.9 % flush 10 mL  10 mL Intravenous Q12H James Huntley MD   10 mL at 07/07/21 0042   • sodium chloride 0.9 % flush 10 mL  10 mL Intravenous PRN James Huntley MD       • sodium chloride 0.9 % flush 3 mL  3 mL Intravenous Q12H James Huntley MD       • sucralfate (CARAFATE) tablet 1 g  1 g Oral 4x Daily AC & at Bedtime James Huntley MD   1 g at 07/08/21 2200   • traMADol (ULTRAM) tablet 50 mg  50 mg Oral TID James Huntley MD   50 mg at 07/08/21 2159   • valACYclovir (VALTREX) tablet 500 mg  500 mg Oral Q24H James Huntley MD   500 mg at 07/08/21 0954           Data:     Code Status:    Code Status and Medical Interventions:   Ordered at: 07/06/21 2234     Code Status:    CPR     Medical Interventions (Level of Support Prior to Arrest):    Full       Family History   Problem Relation Age of Onset   • Cancer Mother    • Heart disease Father        Social History     Socioeconomic History   • Marital status:      Spouse name: Not on file   • Number of children: Not on file   • Years of education: Not on file   • Highest education level: Not on file   Tobacco Use   • Smoking status: Never Smoker   • Smokeless tobacco: Never Used   Substance and Sexual Activity   • Alcohol use: No   • Drug use: Never   • Sexual activity: Defer       Vitals:  /53 (BP Location: Left arm, Patient Position: Lying)    "Pulse 67   Temp 97.7 °F (36.5 °C) (Oral)   Resp 18   Ht 167.6 cm (65.98\")   Wt 89.9 kg (198 lb 1.6 oz)   SpO2 98%   BMI 31.99 kg/m²   Temp (24hrs), Av.6 °F (36.4 °C), Min:97 °F (36.1 °C), Max:98 °F (36.7 °C)      I/O (24Hr):    Intake/Output Summary (Last 24 hours) at 2021 0804  Last data filed at 2021 0700  Gross per 24 hour   Intake 1000 ml   Output 1450 ml   Net -450 ml       Labs:  Lab Results (last 72 hours)     Procedure Component Value Units Date/Time    Tissue / Bone Culture - Bone, Hip, Right [[id number]] Collected: 21    Specimen: Bone from Hip, Right Updated: 21     Gram Stain Few (2+) WBCs seen      No organisms seen    Tissue / Bone Culture - Tissue, Hip, Right [[id number]] Collected: 21    Specimen: Tissue from Hip, Right Updated: 21     Gram Stain Moderate (3+) WBCs seen      Few (2+) Gram negative bacilli    Basic Metabolic Panel [[id number]]  (Abnormal) Collected: 21    Specimen: Blood Updated: 21     Glucose 92 mg/dL      BUN 12 mg/dL      Creatinine 0.75 mg/dL      Sodium 142 mmol/L      Potassium 3.5 mmol/L      Chloride 104 mmol/L      CO2 32.0 mmol/L      Calcium 8.9 mg/dL      eGFR Non African Amer 77 mL/min/1.73      BUN/Creatinine Ratio 16.0     Anion Gap 6.0 mmol/L     Narrative:      GFR Normal >60  Chronic Kidney Disease <60  Kidney Failure <15      Hemoglobin & Hematocrit, Blood [[id number]]  (Abnormal) Collected: 21    Specimen: Blood Updated: 21     Hemoglobin 8.0 g/dL      Hematocrit 25.5 %     Blood Culture - Blood, Arm, Right [[id number]] Collected: 21    Specimen: Blood from Arm, Right Updated: 21     Blood Culture No growth at 2 days    Blood Culture - Blood, Arm, Right [[id number]] Collected: 21    Specimen: Blood from Arm, Right Updated: 21     Blood Culture No growth at 2 days    Anaerobic Culture - Bone, Hip, Right [[id number]] " Collected: 07/08/21 1859    Specimen: Bone from Hip, Right Updated: 07/08/21 1923    Anaerobic Culture - Tissue, Hip, Right [[id number]] Collected: 07/08/21 1858    Specimen: Tissue from Hip, Right Updated: 07/08/21 1923    Urine Culture - Urine, Urine, Clean Catch [[id number]] Collected: 07/06/21 2040    Specimen: Urine, Clean Catch Updated: 07/08/21 1109     Urine Culture >100,000 CFU/mL Mixed Brett Isolated    Narrative:      Specimen contains mixed organisms of questionable pathogenicity which indicates contamination with commensal brett.  Further identification is unlikely to provide clinically useful information.  Suggest recollection.    Comprehensive Metabolic Panel [[id number]]  (Abnormal) Collected: 07/07/21 0550    Specimen: Blood Updated: 07/07/21 0637     Glucose 88 mg/dL      BUN 10 mg/dL      Creatinine 0.77 mg/dL      Sodium 141 mmol/L      Potassium 3.6 mmol/L      Chloride 103 mmol/L      CO2 31.0 mmol/L      Calcium 8.6 mg/dL      Total Protein 6.0 g/dL      Albumin 2.40 g/dL      ALT (SGPT) 6 U/L      AST (SGOT) 16 U/L      Alkaline Phosphatase 99 U/L      Total Bilirubin 0.2 mg/dL      eGFR Non African Amer 75 mL/min/1.73      Globulin 3.6 gm/dL      A/G Ratio 0.7 g/dL      BUN/Creatinine Ratio 13.0     Anion Gap 7.0 mmol/L     Narrative:      GFR Normal >60  Chronic Kidney Disease <60  Kidney Failure <15      C-reactive Protein [[id number]]  (Abnormal) Collected: 07/07/21 0550    Specimen: Blood Updated: 07/07/21 0637     C-Reactive Protein 9.48 mg/dL     Sedimentation Rate [[id number]]  (Abnormal) Collected: 07/07/21 0550    Specimen: Blood Updated: 07/07/21 0626     Sed Rate 54 mm/hr     CBC Auto Differential [[id number]]  (Abnormal) Collected: 07/07/21 0550    Specimen: Blood Updated: 07/07/21 0611     WBC 4.23 10*3/mm3      RBC 2.97 10*6/mm3      Hemoglobin 8.5 g/dL      Hematocrit 27.0 %      MCV 90.9 fL      MCH 28.6 pg      MCHC 31.5 g/dL      RDW 15.5 %      RDW-SD 51.8 fl      MPV 9.5 fL       Platelets 238 10*3/mm3      Neutrophil % 38.1 %      Lymphocyte % 32.6 %      Monocyte % 18.4 %      Eosinophil % 9.5 %      Basophil % 1.2 %      Immature Grans % 0.2 %      Neutrophils, Absolute 1.61 10*3/mm3      Lymphocytes, Absolute 1.38 10*3/mm3      Monocytes, Absolute 0.78 10*3/mm3      Eosinophils, Absolute 0.40 10*3/mm3      Basophils, Absolute 0.05 10*3/mm3      Immature Grans, Absolute 0.01 10*3/mm3      nRBC 0.0 /100 WBC     COVID PRE-OP / PRE-PROCEDURE SCREENING ORDER (NO ISOLATION) - Swab, Nasal Cavity [[id number]]  (Normal) Collected: 07/06/21 2052    Specimen: Swab from Nasal Cavity Updated: 07/06/21 2140    Narrative:      The following orders were created for panel order COVID PRE-OP / PRE-PROCEDURE SCREENING ORDER (NO ISOLATION) - Swab, Nasal Cavity.  Procedure                               Abnormality         Status                     ---------                               -----------         ------                     COVID-19,[facility]...[[id number]]  Normal              Final result                 Please view results for these tests on the individual orders.    COVID-19,[facility] IN-HOUSE,Nasal Swab No Transport Media 3-4 HR TAT - Swab, Nasal Cavity [[id number]]  (Normal) Collected: 07/06/21 2052    Specimen: Swab from Nasal Cavity Updated: 07/06/21 2140     COVID19 Not Detected    Narrative:      Fact sheet for providers: https://www.fda.gov/media/135370/download     Fact sheet for patients: https://www.fda.gov/media/234448/download    Test performed by PCR.    Consider negative results in combination with clinical observations, patient history, and epidemiological information.    Urinalysis With Culture If Indicated - Urine, Clean Catch [[id number]]  (Abnormal) Collected: 07/06/21 2040    Specimen: Urine, Clean Catch Updated: 07/06/21 2106     Color, UA Yellow     Appearance, UA Clear     pH, UA <=5.0     Specific Gravity, UA 1.014     Glucose, UA Negative     Ketones, UA Negative      Bilirubin, UA Negative     Blood, UA Negative     Protein, UA Negative     Leuk Esterase, UA Small (1+)     Nitrite, UA Negative     Urobilinogen, UA 0.2 E.U./dL    Urinalysis, Microscopic Only - Urine, Clean Catch [[id number]]  (Abnormal) Collected: 07/06/21 2040    Specimen: Urine, Clean Catch Updated: 07/06/21 2106     RBC, UA 0-2 /HPF      WBC, UA 6-12 /HPF      Bacteria, UA None Seen /HPF      Squamous Epithelial Cells, UA 13-20 /HPF      Hyaline Casts, UA 3-6 /LPF      Methodology Automated Microscopy    Blood Gas, Venous - [[id number]]  (Abnormal) Collected: 07/06/21 2045    Specimen: Venous Blood Updated: 07/06/21 2052     Site OTHER     pH, Venous 7.383 pH Units      pCO2, Venous 55.1 mm Hg      Comment: 83 Value above reference range        pO2, Venous 29.3 mm Hg      HCO3, Venous 32.8 mmol/L      Comment: 83 Value above reference range        Base Excess, Venous 6.1 mmol/L      Comment: 83 Value above reference range        O2 Saturation, Venous 44.5 %      Comment: 84 Value below reference range        Temperature 37.0 C      Barometric Pressure for Blood Gas 750 mmHg      Modality Room Air     Ventilator Mode NA     Collected by [id number]     Comment: Meter: [id number]     :  [id number]       C-reactive Protein [[id number]]  (Abnormal) Collected: 07/06/21 1946    Specimen: Blood Updated: 07/06/21 2050     C-Reactive Protein 12.16 mg/dL     Sedimentation Rate [[id number]]  (Abnormal) Collected: 07/06/21 1946    Specimen: Blood Updated: 07/06/21 2039     Sed Rate 101 mm/hr     Procalcitonin [[id number]]  (Normal) Collected: 07/06/21 1946    Specimen: Blood Updated: 07/06/21 2026     Procalcitonin 0.07 ng/mL     Narrative:      As a Marker for Sepsis (Non-Neonates):     1. <0.5 ng/mL represents a low risk of severe sepsis and/or septic shock.  2. >2 ng/mL represents a high risk of severe sepsis and/or septic shock.    As a Marker for Lower Respiratory Tract Infections that require antibiotic  "therapy:  PCT on Admission     Antibiotic Therapy             6-12 Hrs later  >0.5                          Strongly Recommended            >0.25 - <0.5             Recommended  0.1 - 0.25                  Discouraged                       Remeasure/reassess PCT  <0.1                         Strongly Discouraged         Remeasure/reassess PCT      As 28 day mortality risk marker: \"Change in Procalcitonin Result\" (>80% or <=80%) if Day 0 (or Day 1) and Day 4 values are available. Refer to http://www.Instant Information[facility] [place] Biopct-calculator.com/    Change in PCT <=80 %   A decrease of PCT levels below or equal to 80% defines a positive change in PCT test result representing a higher risk for 28-day all-cause mortality of patients diagnosed with severe sepsis or septic shock.    Change in PCT >80 %   A decrease of PCT levels of more than 80% defines a negative change in PCT result representing a lower risk for 28-day all-cause mortality of patients diagnosed with severe sepsis or septic shock.                Basic Metabolic Panel [[id number]]  (Abnormal) Collected: 07/06/21 1946    Specimen: Blood Updated: 07/06/21 2022     Glucose 136 mg/dL      BUN 11 mg/dL      Creatinine 0.90 mg/dL      Sodium 139 mmol/L      Potassium 3.6 mmol/L      Chloride 100 mmol/L      CO2 29.0 mmol/L      Calcium 9.2 mg/dL      eGFR Non African Amer 62 mL/min/1.73      BUN/Creatinine Ratio 12.2     Anion Gap 10.0 mmol/L     Narrative:      GFR Normal >60  Chronic Kidney Disease <60  Kidney Failure <15      CK [[id number]]  (Normal) Collected: 07/06/21 1946    Specimen: Blood Updated: 07/06/21 2022     Creatine Kinase 35 U/L     Lactic Acid, Plasma [[id number]]  (Normal) Collected: 07/06/21 1946    Specimen: Blood Updated: 07/06/21 2011     Lactate 1.8 mmol/L     aPTT [[id number]]  (Abnormal) Collected: 07/06/21 1946    Specimen: Blood Updated: 07/06/21 2006     PTT 47.8 seconds     Protime-INR [[id number]]  (Abnormal) Collected: 07/06/21 1946    Specimen: Blood " Updated: 07/06/21 2006     Protime 17.4 Seconds      INR 1.55    CBC & Differential [[id number]]  (Abnormal) Collected: 07/06/21 1946    Specimen: Blood Updated: 07/06/21 1959    Narrative:      The following orders were created for panel order CBC & Differential.  Procedure                               Abnormality         Status                     ---------                               -----------         ------                     CBC Auto Differential[[id number]]        Abnormal            Final result                 Please view results for these tests on the individual orders.    CBC Auto Differential [[id number]]  (Abnormal) Collected: 07/06/21 1946    Specimen: Blood Updated: 07/06/21 1959     WBC 5.16 10*3/mm3      RBC 3.43 10*6/mm3      Hemoglobin 9.7 g/dL      Hematocrit 31.5 %      MCV 91.8 fL      MCH 28.3 pg      MCHC 30.8 g/dL      RDW 15.4 %      RDW-SD 51.9 fl      MPV 9.4 fL      Platelets 297 10*3/mm3      Neutrophil % 52.6 %      Lymphocyte % 23.6 %      Monocyte % 14.5 %      Eosinophil % 8.3 %      Basophil % 0.8 %      Immature Grans % 0.2 %      Neutrophils, Absolute 2.71 10*3/mm3      Lymphocytes, Absolute 1.22 10*3/mm3      Monocytes, Absolute 0.75 10*3/mm3      Eosinophils, Absolute 0.43 10*3/mm3      Basophils, Absolute 0.04 10*3/mm3      Immature Grans, Absolute 0.01 10*3/mm3      nRBC 0.0 /100 WBC               Physical Exam:     Right Hip: Incision is clean, dry, intact. Dressing is Clean, Dry, Intact. Neurovascular intact distally. Moderate tenderness to palpation, soft throughout. No signs or symptoms of DVT or PE.      Assessment:     Primary Problem  Osteomyelitis of right femur (CMS/HCC)  POD 1 Day Post-Op INCISION AND DRAINAGE WOUND RIGHT HIP (Right)        Plan:     1. Continue wet to dry dressing changes  2. ABX per infectious disease   3. Await culture results   4. Discussed the need for plastic surgery to evaluate for possible flap closure  5. Discharge plans pending        Electronically signed by Silverio Parekh PA-C on 7/9/2021 at 08:04 CDT

## 2021-07-09 NOTE — PROGRESS NOTES
Infectious Diseases Progress Note    Patient:  Tawny Shin  YOB: 1955  MRN: [id number]   Admit date: 7/6/2021   Admitting Physician: Jun Hawley*  Primary Care Physician: Davon Urrutia MD    Chief Complaint/Interval History: She had surgery today. Deep sutures and anchors removed. Failed gluteus repair. Deep cultures some from bone obtained. She is stable postoperatively. Son at bedside. He had indicated she has had 3 falls at home over the last 2 weeks. She is currently living with family. Although people with her 24 hours a day, given her balance and strength at present, fall prevention shoe at home. She has some problems with confusion at times as well. Confusion primarily in the evenings. She has a new home that is ready to go that she would like to move into. Unfortunately she does not seem quite ready to be able to manage at her new home with or without assistance.    Intake/Output Summary (Last 24 hours) at 7/9/2021 1409  Last data filed at 7/9/2021 1114  Gross per 24 hour   Intake 2584.1 ml   Output 1450 ml   Net 1134.1 ml     Allergies:   Allergies   Allergen Reactions   • Atorvastatin Other (See Comments)     LEG CRAMPS     • Amoxicillin Rash   • Escitalopram Rash   • Nabumetone Rash   • Niacin Er Rash   • Penicillins Rash   • Simvastatin Rash     Current Scheduled Medications:   apixaban, 5 mg, Oral, Q12H  carvedilol, 25 mg, Oral, BID With Meals  docusate sodium, 100 mg, Oral, BID  docusate sodium, 100 mg, Oral, BID  fluticasone, 1 spray, Nasal, Daily  furosemide, 40 mg, Oral, Daily  gabapentin, 300 mg, Oral, Nightly  isosorbide mononitrate, 60 mg, Oral, BID  levothyroxine, 75 mcg, Oral, Q AM  lidocaine, 1 patch, Transdermal, Nightly  magnesium oxide, 400 mg, Oral, Daily  multivitamin with minerals, 1 tablet, Oral, Daily  naproxen, 375 mg, Oral, BID With Meals  pantoprazole, 40 mg, Oral, Daily  polyethylene glycol, 17 g, Oral, Daily  potassium chloride, 20 mEq, Oral,  "Daily  saccharomyces boulardii, 250 mg, Oral, Daily  sodium chloride, 10 mL, Intravenous, Q12H  sodium chloride, 3 mL, Intravenous, Q12H  sucralfate, 1 g, Oral, 4x Daily AC & at Bedtime  traMADol, 50 mg, Oral, TID  valACYclovir, 500 mg, Oral, Q24H      Current PRN Medications:  •  acetaminophen **OR** acetaminophen  •  HYDROcodone-acetaminophen  •  HYDROcodone-acetaminophen  •  HYDROmorphone **AND** naloxone  •  magnesium hydroxide  •  Morphine **AND** naloxone  •  nitroglycerin  •  ondansetron **OR** ondansetron  •  phenol  •  promethazine **OR** promethazine  •  sodium chloride  •  sodium chloride    Review of Systems see HPI    Vital Signs:  /54 (BP Location: Left arm, Patient Position: Lying)   Pulse 62   Temp 97.6 °F (36.4 °C) (Oral)   Resp 18   Ht 167.6 cm (65.98\")   Wt 89.9 kg (198 lb 1.6 oz)   SpO2 95%   BMI 31.99 kg/m²     Physical Exam  Vital signs - reviewed.  Line/IV (peripheral) site - No erythema, warmth, induration, or tenderness.  Right hip operative dressing clean and dry. No signs of bleeding.    Lab Results:  CBC:   Results from last 7 days   Lab Units 07/09/21  0516 07/07/21  0550 07/06/21 1946   WBC 10*3/mm3  --  4.23 5.16   HEMOGLOBIN g/dL 8.0* 8.5* 9.7*   HEMATOCRIT % 25.5* 27.0* 31.5*   PLATELETS 10*3/mm3  --  238 297     BMP:  Results from last 7 days   Lab Units 07/09/21  0516 07/07/21  0550 07/06/21 1946   SODIUM mmol/L 142 141 139   POTASSIUM mmol/L 3.5 3.6 3.6   CHLORIDE mmol/L 104 103 100   CO2 mmol/L 32.0* 31.0* 29.0   BUN mg/dL 12 10 11   CREATININE mg/dL 0.75 0.77 0.90   GLUCOSE mg/dL 92 88 136*   CALCIUM mg/dL 8.9 8.6 9.2   ALT (SGPT) U/L  --  6  --      Culture Results:   Blood Culture   Date Value Ref Range Status   07/06/2021 No growth at 2 days  Preliminary   07/06/2021 No growth at 2 days  Preliminary     Urine Culture   Date Value Ref Range Status   07/06/2021 >100,000 CFU/mL Mixed Selina Isolated  Final     Operative cultures pending    Radiology: " None  Additional Studies Reviewed: None    Impression:   1. Chronic wound right thigh-failed gluteal repair. Deep sutures and anchor removed.  2. Possible osteomyelitis involving the right trochanter  3. Deconditioning, balance problems, and falls  4. History MSSA bacteremia    Recommendations:   Talked with the patient and her son at length  Would support placement at subacute rehab  Feel she would benefit greatly from aggressive rehab and assistance with wound care  She is likely need IV antibiotics as well  Await culture results  Empiric vancomycin and meropenem pending cultures      Elie Ohara MD

## 2021-07-09 NOTE — PROGRESS NOTES
Infectious Diseases Progress Note    Patient:  Tawny Shin  YOB: 1955  MRN: [id number]   Admit date: 7/6/2021   Admitting Physician: Jun Hawley*  Primary Care Physician: Davon Urrutia MD    Chief Complaint/Interval History: She feels okay.  No new symptoms.  She feels stronger.  She indicates she walks some today.  No fevers or chills.    Intake/Output Summary (Last 24 hours) at 7/9/2021 1842  Last data filed at 7/9/2021 1618  Gross per 24 hour   Intake 2684.1 ml   Output 1450 ml   Net 1234.1 ml     Allergies:   Allergies   Allergen Reactions   • Atorvastatin Other (See Comments)     LEG CRAMPS     • Amoxicillin Rash   • Escitalopram Rash   • Nabumetone Rash   • Niacin Er Rash   • Penicillins Rash   • Simvastatin Rash     Current Scheduled Medications:   apixaban, 5 mg, Oral, Q12H  carvedilol, 25 mg, Oral, BID With Meals  docusate sodium, 100 mg, Oral, BID  fluticasone, 1 spray, Nasal, Daily  furosemide, 40 mg, Oral, Daily  gabapentin, 300 mg, Oral, Nightly  isosorbide mononitrate, 60 mg, Oral, BID  levothyroxine, 75 mcg, Oral, Q AM  lidocaine, 1 patch, Transdermal, Nightly  magnesium oxide, 400 mg, Oral, Daily  meropenem, 1 g, Intravenous, Q8H  multivitamin with minerals, 1 tablet, Oral, Daily  naproxen, 375 mg, Oral, BID With Meals  pantoprazole, 40 mg, Oral, Daily  polyethylene glycol, 17 g, Oral, Daily  potassium chloride, 20 mEq, Oral, Daily  saccharomyces boulardii, 250 mg, Oral, Daily  sodium chloride, 10 mL, Intravenous, Q12H  sodium chloride, 3 mL, Intravenous, Q12H  sucralfate, 1 g, Oral, 4x Daily AC & at Bedtime  traMADol, 50 mg, Oral, TID  valACYclovir, 500 mg, Oral, Q24H  vancomycin, 1,000 mg, Intravenous, Q12H      Current PRN Medications:  •  acetaminophen **OR** acetaminophen  •  HYDROcodone-acetaminophen  •  HYDROcodone-acetaminophen  •  HYDROmorphone **AND** naloxone  •  magnesium hydroxide  •  Morphine **AND** naloxone  •  nitroglycerin  •  ondansetron **OR**  "ondansetron  •  phenol  •  promethazine **OR** promethazine  •  sodium chloride  •  sodium chloride    Review of Systems see HPI    Vital Signs:  /54 (BP Location: Left arm, Patient Position: Lying)   Pulse 62   Temp 97.6 °F (36.4 °C) (Oral)   Resp 18   Ht 167.6 cm (65.98\")   Wt 89.9 kg (198 lb 1.6 oz)   SpO2 95%   BMI 31.99 kg/m²     Physical Exam  Vital signs - reviewed.  Line/IV (peripheral) site - No erythema, warmth, induration, or tenderness.  Right upper lateral thigh with dressing in place    Lab Results:  CBC:   Results from last 7 days   Lab Units 07/09/21  0516 07/07/21  0550 07/06/21 1946   WBC 10*3/mm3  --  4.23 5.16   HEMOGLOBIN g/dL 8.0* 8.5* 9.7*   HEMATOCRIT % 25.5* 27.0* 31.5*   PLATELETS 10*3/mm3  --  238 297     BMP:  Results from last 7 days   Lab Units 07/09/21  0516 07/07/21  0550 07/06/21 1946   SODIUM mmol/L 142 141 139   POTASSIUM mmol/L 3.5 3.6 3.6   CHLORIDE mmol/L 104 103 100   CO2 mmol/L 32.0* 31.0* 29.0   BUN mg/dL 12 10 11   CREATININE mg/dL 0.75 0.77 0.90   GLUCOSE mg/dL 92 88 136*   CALCIUM mg/dL 8.9 8.6 9.2   ALT (SGPT) U/L  --  6  --      Culture Results:   Blood Culture   Date Value Ref Range Status   07/06/2021 No growth at 2 days  Preliminary   07/06/2021 No growth at 2 days  Preliminary     Urine Culture   Date Value Ref Range Status   07/06/2021 >100,000 CFU/mL Mixed Selina Isolated  Final     Operative culture tissue/bone culture right hip no growth  Tissue/bone culture right hip-growth present.  Too young to evaluate.  Few gram-negative bacilli.  Moderate white blood cells.    Radiology: None  Additional Studies Reviewed: None    Impression:   Chronic right hip wound.  Possible osteomyelitis.    Recommendations:   Continue vancomycin  Continue meropenem  Await operative cultures  Continue local wound care  Continue to follow    Elie Ohara MD  "

## 2021-07-09 NOTE — PLAN OF CARE
Goal Outcome Evaluation:  Plan of Care Reviewed With: patient        Progress: improving  Outcome Summary: Pt was able to transfer sit to stand with CGA.  Amb into the hallway with RWX and CGA, pt initially amb with step to gait pattern, but is able to progress to step through.  Will continue to work with pt to increase strength and progress mobility.

## 2021-07-09 NOTE — ANESTHESIA PROCEDURE NOTES
Airway  Urgency: elective    Date/Time: 7/8/2021 6:34 PM    General Information and Staff    Patient location during procedure: OR  CRNA: Andres Daniels CRNA    Indications and Patient Condition  Indications for airway management: airway protection    Preoxygenated: yes  MILS maintained throughout  Mask difficulty assessment: 1 - vent by mask    Final Airway Details  Final airway type: endotracheal airway      Successful airway: ETT  Cuffed: yes   Successful intubation technique: direct laryngoscopy  Endotracheal tube insertion site: oral  Blade: Correia  Blade size: 3  ETT size (mm): 7.0  Cormack-Lehane Classification: grade I - full view of glottis  Placement verified by: chest auscultation and capnometry   Cuff volume (mL): 5  Measured from: lips  ETT/EBT  to lips (cm): 22  Number of attempts at approach: 1  Assessment: lips, teeth, and gum same as pre-op and atraumatic intubation

## 2021-07-09 NOTE — THERAPY TREATMENT NOTE
Acute Care - Physical Therapy Treatment Note  [facility]     Patient Name: Tawny Shin  : 1955  MRN: [id number]  Today's Date: 2021   Onset of Illness/Injury or Date of Surgery: 21       PT Assessment (last 12 hours)      PT Evaluation and Treatment     Row Name 21 1359 21 1035       Physical Therapy Time and Intention    Subjective Information  complains of;pain  -KINGS  no complaints;pain  -KINGS    Document Type  therapy note (daily note)  -KINGS  therapy note (daily note)  -    Mode of Treatment  physical therapy  -KINGS  physical therapy  -    Row Name 21 0841          Physical Therapy Time and Intention    Document Type  therapy note (daily note)  -     Mode of Treatment  physical therapy  -KINGS     Comment, Session Not Performed  pt was eating breakfast will check back  -     Row Name 21 1359 21 1035       General Information    Existing Precautions/Restrictions  fall  -  fall  -    Row Name 21 1359 21 1035       Pain Scale: Numbers Pre/Post-Treatment    Pretreatment Pain Rating  2/10  -KINGS  2/10  -KINGS    Posttreatment Pain Rating  3/10  -KINGS  2/10  -KINGS    Pain Location - Side  Right  -KINGS  Right  -KINGS    Pain Location  hip;extremity  -KINGS  hip  -    Row Name 21 1359 21 1035       Bed Mobility    Supine-Sit Gay (Bed Mobility)  standby assist  -KINGS  --    Sit-Supine Gay (Bed Mobility)  standby assist  -KINGS  --    Comment (Bed Mobility)  --  in the chair   -KINGS    Row Name 21 1359 21 1035       Transfers    Sit-Stand Gay (Transfers)  verbal cues;contact guard  -KINGS  verbal cues;contact guard  -KINGS    Stand-Sit Gay (Transfers)  verbal cues;contact guard  -KINGS  verbal cues;contact guard  -KINGS    Row Name 21 1359 21 1035       Sit-Stand Transfer    Assistive Device (Sit-Stand Transfers)  walker, front-wheeled  -KINGS  walker, front-wheeled  -KINGS    Row Name 21 1359 21 1035        Stand-Sit Transfer    Assistive Device (Stand-Sit Transfers)  walker, front-wheeled  -KINGS  walker, front-wheeled  -KINGS    Row Name 07/09/21 1359 07/09/21 1035       Gait/Stairs (Locomotion)    [place] (Gait)  verbal cues;contact guard  -KINGS  verbal cues;contact guard  -KINGS    Assistive Device (Gait)  walker, front-wheeled  -KINGS  walker, front-wheeled  -KINGS    Distance in Feet (Gait)  160  -KINGS  120  -KINGS    Pattern (Gait)  step-to;step-through  -KINGS  step-to;step-through  -KINGS    Comment (Gait/Stairs)  pt progresses to step through gait pattern   -KINGS  pt starts out with a step to gait pattern but is able to progress to step through   -KINGS    Row Name             Wound 02/09/21 1715 Right hip Incision    Wound - Properties Group Placement Date: 02/09/21  [facility] Placement Time: 1715 - Present on Hospital Admission: Y  -SH Side: Right  -SH Location: hip  -SH Primary Wound Type: Incision  -SH    Retired Wound - Properties Group Date first assessed: 02/09/21  [facility] Time first assessed: 1715 - Present on Hospital Admission: Y  -SH Side: Right  -SH Location: hip  -SH Primary Wound Type: Incision  -SH    Row Name 07/09/21 1359 07/09/21 1035       Positioning and Restraints    Pre-Treatment Position  in bed  -KINGS  sitting in chair/recliner  -KINGS    Post Treatment Position  bed  -KINGS  chair  -KINGS    In Bed  fowlers;call light within reach;encouraged to call for assist;side rails up x2  -KINGS  --    In Chair  --  sitting;call light within reach;encouraged to call for assist  -KINGS      User Key  (r) = Recorded By, (t) = Taken By, (c) = Cosigned By    Initials Name Provider Type    Garcia Jaramillo PTA Physical Therapy Assistant    SH Laurie Herrera, RN Registered Nurse        Physical Therapy Education                 Title: PT OT SLP Therapies (In Progress)     Topic: Physical Therapy (In Progress)     Point: Mobility training (Done)     Learning Progress Summary           Patient Acceptance, E, VU by KINGS at 7/8/2021 08     Comment: progression with amb    BEBO Hernandez, VU by MS at 7/7/2021 1511    Comment: role of PT in her care                   Point: Home exercise program (Not Started)     Learner Progress:  Not documented in this visit.          Point: Body mechanics (Not Started)     Learner Progress:  Not documented in this visit.          Point: Precautions (Not Started)     Learner Progress:  Not documented in this visit.                      User Key     Initials Effective Dates Name Provider Type Discipline    MS 06/19/18 -  Nicole Olson, PT, DPT, NCS Physical Therapist PT     12/08/16 -  Garcia Francis, PTA Physical Therapy Assistant PT              PT Recommendation and Plan     Plan of Care Reviewed With: patient  Progress: improving  Outcome Summary: Pt was able to transfer sit to stand with CGA.  Amb into the hallway with RWX and CGA, pt initially amb with step to gait pattern, but is able to progress to step through.  Will continue to work with pt to increase strength and progress mobility.  Outcome Measures     Row Name 07/07/21 6850             How much help from another is currently needed...    Putting on and taking off regular lower body clothing?  2  -AC      Bathing (including washing, rinsing, and drying)  2  -AC      Toileting (which includes using toilet bed pan or urinal)  2  -AC      Putting on and taking off regular upper body clothing  4  -AC      Taking care of personal grooming (such as brushing teeth)  4  -AC      Eating meals  4  -AC      AM-PAC 6 Clicks Score (OT)  18  -AC         Functional Assessment    Outcome Measure Options  AM-PAC 6 Clicks Daily Activity (OT)  -AC        User Key  (r) = Recorded By, (t) = Taken By, (c) = Cosigned By    Initials Name Provider Type    AC Sebastián Howe, OTR/L, CNT Occupational Therapist           Time Calculation:   PT Charges     Row Name 07/09/21 1359 07/09/21 1035          Time Calculation    Start Time  1359  -KINGS  1035  -KINGS     Stop Time  0218   -KINGS  1059  -KINGS     Time Calculation (min)  23 min  -KINGS  24 min  -KINGS     PT Received On  07/09/21  -KINGS  07/09/21  -KINGS        Time Calculation- PT    Total Timed Code Minutes- PT  23 minute(s)  -KINGS  24 minute(s)  -KINGS        Timed Charges    81163 - Gait Training Minutes   23  -KINGS  24  -KINGS        Total Minutes    Timed Charges Total Minutes  23  -KINGS  24  -KINGS      Total Minutes  23  -KINGS  24  -KINGS       User Key  (r) = Recorded By, (t) = Taken By, (c) = Cosigned By    Initials Name Provider Type    Garcia Jaarmillo PTA Physical Therapy Assistant        Therapy Charges for Today     Code Description Service Date Service Provider Modifiers Qty    27470558067 HC GAIT TRAINING EA 15 MIN 7/8/2021 Garcia Francis, PTA GP 2    54738119128 HC GAIT TRAINING EA 15 MIN 7/8/2021 Garcia Francis, PTA GP 1    18406846109 HC GAIT TRAINING EA 15 MIN 7/9/2021 Garcia Francis, PTA GP 2    39986097607 HC GAIT TRAINING EA 15 MIN 7/9/2021 Garcia Francis, PTA GP 2          PT G-Codes  Outcome Measure Options: (P) AM-PAC 6 Clicks Daily Activity (OT)  AM-PAC 6 Clicks Score (PT): 16  AM-PAC 6 Clicks Score (OT): (P) 20    Garcia Francis PTA  7/9/2021

## 2021-07-09 NOTE — THERAPY TREATMENT NOTE
Acute Care - Physical Therapy Treatment Note  [facility]     Patient Name: Tawny Shin  : 1955  MRN: [id number]  Today's Date: 2021   Onset of Illness/Injury or Date of Surgery: 21       PT Assessment (last 12 hours)      PT Evaluation and Treatment     Row Name 21 1035 21 0841       Physical Therapy Time and Intention    Subjective Information  no complaints;pain  -KINGS  --    Document Type  therapy note (daily note)  -KINGS  therapy note (daily note)  -KINGS    Mode of Treatment  physical therapy  -KINGS  physical therapy  -KINGS    Comment, Session Not Performed  --  pt was eating breakfast will check back  -KINGS    Row Name 21 1035          General Information    Existing Precautions/Restrictions  fall  -KINGS     Row Name 21 1035          Pain Scale: Numbers Pre/Post-Treatment    Pretreatment Pain Rating  2/10  -KINGS     Posttreatment Pain Rating  2/10  -KINGS     Pain Location - Side  Right  -KINGS     Pain Location  hip  -KINGS     Row Name 21 1035          Bed Mobility    Comment (Bed Mobility)  in the chair   -KINGS     Row Name 21 1035          Transfers    Sit-Stand Wake (Transfers)  verbal cues;contact guard  -KINGS     Stand-Sit Wake (Transfers)  verbal cues;contact guard  -KINGS     Row Name 21 1035          Sit-Stand Transfer    Assistive Device (Sit-Stand Transfers)  walker, front-wheeled  -KINGS     Row Name 21 1035          Stand-Sit Transfer    Assistive Device (Stand-Sit Transfers)  walker, front-wheeled  -KINGS     Row Name 21 1035          Gait/Stairs (Locomotion)    [place] (Gait)  verbal cues;contact guard  -KINGS     Assistive Device (Gait)  walker, front-wheeled  -KINGS     Distance in Feet (Gait)  120  -KINGS     Pattern (Gait)  step-to;step-through  -KINGS     Comment (Gait/Stairs)  pt starts out with a step to gait pattern but is able to progress to step through   -KINGS     Row Name             Wound 21 1715 Right hip Incision    Wound -  Properties Group Placement Date: 02/09/21  - Placement Time: 1715  -SH Present on Hospital Admission: Y  -SH Side: Right  -SH Location: hip  -SH Primary Wound Type: Incision  -SH    Retired Wound - Properties Group Date first assessed: 02/09/21  - Time first assessed: 1715  -SH Present on Hospital Admission: Y  -SH Side: Right  -SH Location: hip  -SH Primary Wound Type: Incision  -SH    Row Name 07/09/21 1035          Positioning and Restraints    Pre-Treatment Position  sitting in chair/recliner  -KINGS     Post Treatment Position  chair  -KINGS     In Chair  sitting;call light within reach;encouraged to call for assist  -KINGS       User Key  (r) = Recorded By, (t) = Taken By, (c) = Cosigned By    Initials Name Provider Type    KINGS Garcia Francis PTA Physical Therapy Assistant     Laurie Herrera, RN Registered Nurse        Physical Therapy Education                 Title: PT OT SLP Therapies (In Progress)     Topic: Physical Therapy (In Progress)     Point: Mobility training (Done)     Learning Progress Summary           Patient Acceptance, E, VU by KINGS at 7/8/2021 0831    Comment: progression with amb    Acceptance, E, VU by MS at 7/7/2021 1511    Comment: role of PT in her care                   Point: Home exercise program (Not Started)     Learner Progress:  Not documented in this visit.          Point: Body mechanics (Not Started)     Learner Progress:  Not documented in this visit.          Point: Precautions (Not Started)     Learner Progress:  Not documented in this visit.                      User Key     Initials Effective Dates Name Provider Type Discipline    MS 06/19/18 -  Nicole Olson, PT, DPT, NCS Physical Therapist PT     12/08/16 -  Garcia Francis PTA Physical Therapy Assistant PT              PT Recommendation and Plan     Plan of Care Reviewed With: patient  Progress: improving  Outcome Summary: Pt was able to transfer sit to stand with CGA.  Amb into the hallway with RWX and CGA, pt  initially amb with step to gait pattern, but is able to progress to step through.  Will continue to work with pt to increase strength and progress mobility.  Outcome Measures     Row Name 07/07/21 1356             How much help from another is currently needed...    Putting on and taking off regular lower body clothing?  2  -AC      Bathing (including washing, rinsing, and drying)  2  -AC      Toileting (which includes using toilet bed pan or urinal)  2  -AC      Putting on and taking off regular upper body clothing  4  -AC      Taking care of personal grooming (such as brushing teeth)  4  -AC      Eating meals  4  -AC      AM-PAC 6 Clicks Score (OT)  18  -AC         Functional Assessment    Outcome Measure Options  AM-PAC 6 Clicks Daily Activity (OT)  -AC        User Key  (r) = Recorded By, (t) = Taken By, (c) = Cosigned By    Initials Name Provider Type    AC Sebastián Howe, OTR/L, CNT Occupational Therapist           Time Calculation:   PT Charges     Row Name 07/09/21 1035             Time Calculation    Start Time  1035  -KINGS      Stop Time  1059  -KINGS      Time Calculation (min)  24 min  -KINGS      PT Received On  07/09/21  -KINGS         Time Calculation- PT    Total Timed Code Minutes- PT  24 minute(s)  -KINGS         Timed Charges    56671 - Gait Training Minutes   24  -KINGS         Total Minutes    Timed Charges Total Minutes  24  -KINGS       Total Minutes  24  -KINGS        User Key  (r) = Recorded By, (t) = Taken By, (c) = Cosigned By    Initials Name Provider Type    Garcia Jaramillo, PTA Physical Therapy Assistant        Therapy Charges for Today     Code Description Service Date Service Provider Modifiers Qty    56446988067 HC GAIT TRAINING EA 15 MIN 7/8/2021 Garcia Francis, SERENA GP 2    86192467951 HC GAIT TRAINING EA 15 MIN 7/8/2021 Garcia Francis, PTA GP 1    [id number] HC GAIT TRAINING EA 15 MIN 7/9/2021 aGrcia Francis, PTA GP 2          PT G-Codes  Outcome Measure Options: (P) AM-PAC 6 Clicks Daily  Activity (OT)  AM-PAC 6 Clicks Score (PT): 16  AM-PAC 6 Clicks Score (OT): (P) 20    Garcia Francis, PTA  7/9/2021

## 2021-07-10 LAB — VANCOMYCIN TROUGH SERPL-MCNC: 17.3 MCG/ML (ref 5–20)

## 2021-07-10 PROCEDURE — 80202 ASSAY OF VANCOMYCIN: CPT | Performed by: INTERNAL MEDICINE

## 2021-07-10 PROCEDURE — 25010000002 MEROPENEM PER 100 MG: Performed by: INTERNAL MEDICINE

## 2021-07-10 PROCEDURE — 97116 GAIT TRAINING THERAPY: CPT

## 2021-07-10 PROCEDURE — 25010000002 VANCOMYCIN PER 500 MG: Performed by: INTERNAL MEDICINE

## 2021-07-10 PROCEDURE — 97110 THERAPEUTIC EXERCISES: CPT

## 2021-07-10 RX ORDER — PANTOPRAZOLE SODIUM 40 MG/1
40 TABLET, DELAYED RELEASE ORAL
Status: DISCONTINUED | OUTPATIENT
Start: 2021-07-10 | End: 2021-07-13 | Stop reason: HOSPADM

## 2021-07-10 RX ORDER — FUROSEMIDE 40 MG/1
40 TABLET ORAL
Status: DISCONTINUED | OUTPATIENT
Start: 2021-07-10 | End: 2021-07-13 | Stop reason: HOSPADM

## 2021-07-10 RX ORDER — MAGNESIUM CARB/ALUMINUM HYDROX 105-160MG
296 TABLET,CHEWABLE ORAL ONCE
Status: COMPLETED | OUTPATIENT
Start: 2021-07-10 | End: 2021-07-10

## 2021-07-10 RX ORDER — PANTOPRAZOLE SODIUM 40 MG/1
40 TABLET, DELAYED RELEASE ORAL
Status: DISCONTINUED | OUTPATIENT
Start: 2021-07-11 | End: 2021-07-10 | Stop reason: SDUPTHER

## 2021-07-10 RX ADMIN — TRAMADOL HYDROCHLORIDE 50 MG: 50 TABLET, FILM COATED ORAL at 21:19

## 2021-07-10 RX ADMIN — POLYETHYLENE GLYCOL (3350) 17 G: 17 POWDER, FOR SOLUTION ORAL at 09:00

## 2021-07-10 RX ADMIN — GABAPENTIN 300 MG: 300 CAPSULE ORAL at 21:19

## 2021-07-10 RX ADMIN — Medication 400 MG: at 09:05

## 2021-07-10 RX ADMIN — APIXABAN 5 MG: 5 TABLET, FILM COATED ORAL at 21:19

## 2021-07-10 RX ADMIN — TRAMADOL HYDROCHLORIDE 50 MG: 50 TABLET, FILM COATED ORAL at 09:05

## 2021-07-10 RX ADMIN — LIDOCAINE 1 PATCH: 50 PATCH CUTANEOUS at 21:19

## 2021-07-10 RX ADMIN — SUCRALFATE 1 G: 1 TABLET ORAL at 06:30

## 2021-07-10 RX ADMIN — DOCUSATE SODIUM 100 MG: 100 CAPSULE ORAL at 21:19

## 2021-07-10 RX ADMIN — VALACYCLOVIR 500 MG: 500 TABLET, FILM COATED ORAL at 09:06

## 2021-07-10 RX ADMIN — LEVOTHYROXINE SODIUM 75 MCG: 75 TABLET ORAL at 06:20

## 2021-07-10 RX ADMIN — SODIUM CHLORIDE, PRESERVATIVE FREE 10 ML: 5 INJECTION INTRAVENOUS at 21:22

## 2021-07-10 RX ADMIN — CARVEDILOL 25 MG: 25 TABLET, FILM COATED ORAL at 09:00

## 2021-07-10 RX ADMIN — NAPROXEN 375 MG: 375 TABLET ORAL at 09:07

## 2021-07-10 RX ADMIN — DOCUSATE SODIUM 100 MG: 100 CAPSULE ORAL at 09:00

## 2021-07-10 RX ADMIN — MEROPENEM 1 G: 1 INJECTION, POWDER, FOR SOLUTION INTRAVENOUS at 06:20

## 2021-07-10 RX ADMIN — MEROPENEM 1 G: 1 INJECTION, POWDER, FOR SOLUTION INTRAVENOUS at 21:19

## 2021-07-10 RX ADMIN — Medication 1 TABLET: at 09:05

## 2021-07-10 RX ADMIN — MEROPENEM 1 G: 1 INJECTION, POWDER, FOR SOLUTION INTRAVENOUS at 14:34

## 2021-07-10 RX ADMIN — VANCOMYCIN HYDROCHLORIDE 1000 MG: 1 INJECTION, SOLUTION INTRAVENOUS at 04:00

## 2021-07-10 RX ADMIN — ISOSORBIDE MONONITRATE 60 MG: 60 TABLET, EXTENDED RELEASE ORAL at 21:19

## 2021-07-10 RX ADMIN — TRAMADOL HYDROCHLORIDE 50 MG: 50 TABLET, FILM COATED ORAL at 16:09

## 2021-07-10 RX ADMIN — APIXABAN 5 MG: 5 TABLET, FILM COATED ORAL at 09:00

## 2021-07-10 RX ADMIN — PANTOPRAZOLE SODIUM 40 MG: 40 TABLET, DELAYED RELEASE ORAL at 07:58

## 2021-07-10 RX ADMIN — Medication 250 MG: at 09:00

## 2021-07-10 RX ADMIN — FUROSEMIDE 40 MG: 40 TABLET ORAL at 08:59

## 2021-07-10 RX ADMIN — SUCRALFATE 1 G: 1 TABLET ORAL at 11:23

## 2021-07-10 RX ADMIN — FLUTICASONE PROPIONATE 1 SPRAY: 50 SPRAY, METERED NASAL at 10:31

## 2021-07-10 RX ADMIN — SUCRALFATE 1 G: 1 TABLET ORAL at 21:18

## 2021-07-10 RX ADMIN — POTASSIUM CHLORIDE 20 MEQ: 750 CAPSULE, EXTENDED RELEASE ORAL at 09:05

## 2021-07-10 RX ADMIN — FUROSEMIDE 40 MG: 40 TABLET ORAL at 17:31

## 2021-07-10 RX ADMIN — SUCRALFATE 1 G: 1 TABLET ORAL at 17:31

## 2021-07-10 RX ADMIN — CARVEDILOL 25 MG: 25 TABLET, FILM COATED ORAL at 17:31

## 2021-07-10 RX ADMIN — Medication 296 ML: at 10:31

## 2021-07-10 RX ADMIN — ISOSORBIDE MONONITRATE 60 MG: 60 TABLET, EXTENDED RELEASE ORAL at 09:00

## 2021-07-10 NOTE — PROGRESS NOTES
Infectious Diseases Progress Note    Patient:  Tawny Shin  YOB: 1955  MRN: [id number]   Admit date: 7/6/2021   Admitting Physician: Jun Hawley*  Primary Care Physician: Davon Urrutia MD    Chief Complaint/Interval History: She is without fever.  She is alert and interactive.  Reviewed culture results with her.  One of the cultures is growing Pseudomonas.    Intake/Output Summary (Last 24 hours) at 7/10/2021 1048  Last data filed at 7/10/2021 0626  Gross per 24 hour   Intake 1524.1 ml   Output 1750 ml   Net -225.9 ml     Allergies:   Allergies   Allergen Reactions   • Atorvastatin Other (See Comments)     LEG CRAMPS     • Amoxicillin Rash   • Escitalopram Rash   • Nabumetone Rash   • Niacin Er Rash   • Penicillins Rash   • Simvastatin Rash     Current Scheduled Medications:   apixaban, 5 mg, Oral, Q12H  carvedilol, 25 mg, Oral, BID With Meals  docusate sodium, 100 mg, Oral, BID  fluticasone, 1 spray, Nasal, Daily  furosemide, 40 mg, Oral, BID  gabapentin, 300 mg, Oral, Nightly  isosorbide mononitrate, 60 mg, Oral, BID  levothyroxine, 75 mcg, Oral, Q AM  lidocaine, 1 patch, Transdermal, Nightly  magnesium oxide, 400 mg, Oral, Daily  meropenem, 1 g, Intravenous, Q8H  multivitamin with minerals, 1 tablet, Oral, Daily  pantoprazole, 40 mg, Oral, Q AM  polyethylene glycol, 17 g, Oral, Daily  potassium chloride, 20 mEq, Oral, Daily  saccharomyces boulardii, 250 mg, Oral, Daily  sodium chloride, 10 mL, Intravenous, Q12H  sodium chloride, 3 mL, Intravenous, Q12H  sucralfate, 1 g, Oral, 4x Daily AC & at Bedtime  traMADol, 50 mg, Oral, TID  valACYclovir, 500 mg, Oral, Q24H  vancomycin, 1,000 mg, Intravenous, Q12H      Current PRN Medications:  •  acetaminophen **OR** acetaminophen  •  HYDROcodone-acetaminophen  •  HYDROcodone-acetaminophen  •  HYDROmorphone **AND** naloxone  •  magnesium hydroxide  •  Morphine **AND** naloxone  •  nitroglycerin  •  ondansetron **OR** ondansetron  •   "phenol  •  promethazine **OR** promethazine  •  sodium chloride  •  sodium chloride    Review of Systems see HPI    Vital Signs:  /46 (BP Location: Right arm, Patient Position: Sitting)   Pulse 64   Temp 97.7 °F (36.5 °C) (Oral)   Resp 18   Ht 167.6 cm (65.98\")   Wt 89.9 kg (198 lb 1.6 oz)   SpO2 95%   BMI 31.99 kg/m²     Physical Exam  Vital signs - reviewed.  Line/IV (peripheral) site - No erythema, warmth, induration, or tenderness.  Right hip dressing clean and dry  Extremities trace edema    Lab Results:  CBC:   Results from last 7 days   Lab Units 07/09/21  0516 07/07/21  0550 07/06/21 1946   WBC 10*3/mm3  --  4.23 5.16   HEMOGLOBIN g/dL 8.0* 8.5* 9.7*   HEMATOCRIT % 25.5* 27.0* 31.5*   PLATELETS 10*3/mm3  --  238 297     BMP:  Results from last 7 days   Lab Units 07/09/21  0516 07/07/21  0550 07/06/21 1946   SODIUM mmol/L 142 141 139   POTASSIUM mmol/L 3.5 3.6 3.6   CHLORIDE mmol/L 104 103 100   CO2 mmol/L 32.0* 31.0* 29.0   BUN mg/dL 12 10 11   CREATININE mg/dL 0.75 0.77 0.90   GLUCOSE mg/dL 92 88 136*   CALCIUM mg/dL 8.9 8.6 9.2   ALT (SGPT) U/L  --  6  --      Culture Results:   Blood Culture   Date Value Ref Range Status   07/06/2021 No growth at 3 days  Preliminary   07/06/2021 No growth at 3 days  Preliminary     Urine Culture   Date Value Ref Range Status   07/06/2021 >100,000 CFU/mL Mixed Selina Isolated  Final     Tissue Culture   Lab   Scant growth (1+) Pseudomonas speciesAbnormal    [facility]             Gram Stain   Lab   Moderate (3+) WBCs seen  [facility]      Few (2+) Gram negative bacilli  [facility]           Radiology: None  Additional Studies Reviewed: None    Impression:   Chronic right hip wound.  Possible osteomyelitis.  Pseudomonas on culture.    Recommendations:   Discontinue vancomycin  Continue treatment with meropenem  Await susceptibility testing  Anticipating 4 to 6-week course of antibiotic treatment  Suggest PICC line placement  Talked with her again today-feel she " would be an excellent candidate for subacute rehab/nursing home placement for additional physical therapy, wound care, and intravenous antibiotic treatment    Elie Ohara MD

## 2021-07-10 NOTE — PROGRESS NOTES
1           [facility] Medicine Services  INPATIENT PROGRESS NOTE    Patient Name: Tawny Shin  Date of Admission: 7/6/2021  Today's Date: 07/10/21  Length of Stay: 4  Primary Care Physician: Davon Urrutia MD    Subjective   Chief Complaint: Follow-up  HPI   As of yesterday, patient was initiated on Merrem and vancomycin per Dr. Copeland  She had biopsy of wound/bone on July 8 care of Dr. Huntley.  Patient remains afebrile  Culture from bone showed gram-negative bacilli.    Patient states she is doing well. She is eating okay. She has not had any bowel movement for 1 week despite bowel regimen.  She does not complain of any abdominal pain  Still has some swelling of lower extremities  She has known history of DVT in June 2021 and on Eliquis  She denies any bleeding episode       Review of Systems     All pertinent negatives and positives are as above. All other systems have been reviewed and are negative unless otherwise stated.     Objective    Temp:  [97.6 °F (36.4 °C)-99 °F (37.2 °C)] 97.7 °F (36.5 °C)  Heart Rate:  [62-71] 64  Resp:  [18-20] 18  BP: (101-140)/(46-60) 121/46  Physical Exam  Seated comfortably on recliner.  No distress  She is neurologically intact.   She is interactive and appropriate in affect  Anicteric sclera  Supple neck  No thyromegaly  Moist oral mucosa  Lungs are clear to auscultation with good air exchange  S1-S2, regular rate and rhythm  Soft abdomen, nontender, no organomegaly  No cyanosis  Positive pitting edema      Results Review:  I have reviewed the labs, radiology results, and diagnostic studies.    Laboratory Data:   Results from last 7 days   Lab Units 07/09/21  0516 07/07/21  0550 07/06/21 1946   WBC 10*3/mm3  --  4.23 5.16   HEMOGLOBIN g/dL 8.0* 8.5* 9.7*   HEMATOCRIT % 25.5* 27.0* 31.5*   PLATELETS 10*3/mm3  --  238 297        Results from last 7 days   Lab Units 07/09/21  0516 07/07/21  0550 07/06/21  1946   SODIUM mmol/L 142 141 139    POTASSIUM mmol/L 3.5 3.6 3.6   CHLORIDE mmol/L 104 103 100   CO2 mmol/L 32.0* 31.0* 29.0   BUN mg/dL 12 10 11   CREATININE mg/dL 0.75 0.77 0.90   CALCIUM mg/dL 8.9 8.6 9.2   BILIRUBIN mg/dL  --  0.2  --    ALK PHOS U/L  --  99  --    ALT (SGPT) U/L  --  6  --    AST (SGOT) U/L  --  16  --    GLUCOSE mg/dL 92 88 136*       Culture Data:   Blood Culture   Date Value Ref Range Status   07/06/2021 No growth at 3 days  Preliminary   07/06/2021 No growth at 3 days  Preliminary     Urine Culture   Date Value Ref Range Status   07/06/2021 >100,000 CFU/mL Mixed Selina Isolated  Final       Radiology Data:   Imaging Results (Last 24 Hours)     ** No results found for the last 24 hours. **          I have reviewed the patient's current medications.     Assessment/Plan     Active Hospital Problems    Diagnosis    • **Osteomyelitis of right femur (CMS/HCC)    • Transient alteration of awareness    • Non-healing surgical wound, right hip    • Chronic deep vein thrombosis (DVT) of right lower extremity (CMS/HCC)    • Chronic pain syndrome        Problem list  Chronic wound right hip with concern for chronic/(?)Treated osteomyelitis-normally has a wound VAC, home health following.  Recent history of DVT (June 2021) with prior history of it (confirmed with son it was prior superficial vein thrombosis).    Fall, recurrent -because of accidental injury at home  Chronic pain syndrome  Bilateral lower extremity edema  Hypertension  Constipation      Discussion/plan  · Updated tissue/bone culture just now showed Pseudomonas species. Blood culture no growth to date. Patient was initiated on Merrem and vancomycin per ID on July 9  · Status post I&D and bone culture of chronic right hip wound on July 7 by Dr. Huntley.  · ID following  · Plastic surgery referral.  Timing to be discussed with ID and Ortho and anticipate that it will not be anytime soon until the infection is addressed.  · Culture from July 8 showed gram-negative bacilli  pending identification and susceptibility report  · Continue PT OT  · Continue diuresis -I increased today to twice daily with close monitoring on basic metabolic panel patient back on apixaban for DVT.  · Noted trend of hemoglobin.  Will monitor for any active bleeding or signs of bleeding.  Patient at increased risk of bleeding with apixaban and naproxen.  Looking at discontinuing naproxen and placed on alternative medication.  Patient was started on Lidoderm patch yesterday as documented by nursing.  · Transfuse as needed if hemoglobin less than 7  · I will add one-time dose of mag citrate for constipation  · Discussed with nurse Hernandez.  · Increase activities as tolerated    apixaban, 5 mg, Oral, Q12H  carvedilol, 25 mg, Oral, BID With Meals  docusate sodium, 100 mg, Oral, BID  fluticasone, 1 spray, Nasal, Daily  furosemide, 40 mg, Oral, Daily  gabapentin, 300 mg, Oral, Nightly  isosorbide mononitrate, 60 mg, Oral, BID  levothyroxine, 75 mcg, Oral, Q AM  lidocaine, 1 patch, Transdermal, Nightly  magnesium oxide, 400 mg, Oral, Daily  meropenem, 1 g, Intravenous, Q8H  multivitamin with minerals, 1 tablet, Oral, Daily  naproxen, 375 mg, Oral, BID With Meals  pantoprazole, 40 mg, Oral, Q AM  polyethylene glycol, 17 g, Oral, Daily  potassium chloride, 20 mEq, Oral, Daily  saccharomyces boulardii, 250 mg, Oral, Daily  sodium chloride, 10 mL, Intravenous, Q12H  sodium chloride, 3 mL, Intravenous, Q12H  sucralfate, 1 g, Oral, 4x Daily AC & at Bedtime  traMADol, 50 mg, Oral, TID  valACYclovir, 500 mg, Oral, Q24H  vancomycin, 1,000 mg, Intravenous, Q12H              Discharge Planning: tgbd  Electronically signed by Jun Hawley MD, 07/10/21, 08:48 CDT.

## 2021-07-10 NOTE — THERAPY TREATMENT NOTE
Acute Care - Occupational Therapy Treatment Note  [facility]     Patient Name: Tawny Shin  : 1955  MRN: [id number]  Today's Date: 7/10/2021  Onset of Illness/Injury or Date of Surgery: 21  Date of Referral to OT: 21  Referring Physician: Dr. Hawley    Admit Date: 2021       ICD-10-CM ICD-9-CM   1. Osteomyelitis of right femur, unspecified type (CMS/[facility])  M86.9 730.25   2. Altered mental status, unspecified altered mental status type  R41.82 780.97   3. Decreased activities of daily living (ADL)  Z78.9 V49.89   4. Decreased functional activity tolerance  R68.89 780.99   5. Osteomyelitis (CMS/[facility])  M86.9 730.20     Patient Active Problem List   Diagnosis   • Eustachian tube dysfunction   • Sensorineural hearing loss   • Spondylolisthesis of lumbar region   • Coronary artery disease   • Hyperlipidemia   • Hypertension   • Myalgia due to statin   • S/P coronary artery stent placement   • Pacemaker   • Seropositive rheumatoid arthritis of multiple sites (CMS/[facility])   • Sick sinus syndrome (CMS/[facility])   • Age-related osteoporosis with current pathological fracture   • Allergic-infective asthma   • Arthritis of both knees   • Vasovagal syncope   • Bilateral bunions   • Bronchitis   • Cardiac pacemaker syndrome   • Charcot's joint of foot, left   • Constipation   • 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   • Intrinsic asthma   • Left carotid bruit   • Neutropenia (CMS/[facility])   • Primary osteoarthritis of left knee   • Psoriasis vulgaris   • Recurrent acute serous otitis media of both ears   • Thrombocytopenia (CMS/[facility])   • Hypothyroidism   • Vitamin D deficiency   • Myxedema heart disease   • Syncope, cardiogenic   • Rupture of gluteus minimus tendon   • Muscle tear   • Syncope, recurrent   • Leukocytosis   • Headache   • Elevated CPK   • Staphylococcus aureus bacteremia   • Right hip pain, suspected infection   • Obesity (BMI 30-39.9)   • Stenosis of cervical spine with myelopathy (CMS/HCC)   • Spinal  stenosis, lumbar region, with neurogenic claudication   • Osteomyelitis of lumbar spine (CMS/HCC)   • Iliopsoas abscess (CMS/HCC)   • Closed wedge compression fracture of T12 vertebra (CMS/HCC)   • Concussion with no loss of consciousness   • Fall as cause of accidental injury at home as place of occurrence   • Chronic pain syndrome   • Hypokalemia   • Bilateral lower extremity edema   • Chronic deep vein thrombosis (DVT) of right lower extremity (CMS/HCC)   • Closed fracture of ankle   • Closed fracture of lateral malleolus   • Closed fracture of shaft of metatarsal bone   • Osteomyelitis of right femur (CMS/HCC)   • Transient alteration of awareness   • Non-healing surgical wound, right hip     Past Medical History:   Diagnosis Date   • Arthritis    • Asthma    • Chronic sinusitis    • Coronary artery disease    • Eustachian tube dysfunction    • Heart disease    • Herpes simplex    • Hyperlipidemia    • Hypertension    • Knee dislocation    • Labral tear of right hip joint    • Laryngitis sicca    • Laryngitis, chronic    • MI (myocardial infarction) (CMS/HCC)    • Otorrhea    • Sensorineural hearing loss    • Sjogren's disease (CMS/HCC)      Past Surgical History:   Procedure Laterality Date   • A-V CARDIAC PACEMAKER INSERTION  2016   • ATRIAL CARDIAC PACEMAKER INSERTION     • CARDIAC CATHETERIZATION     • CATARACT EXTRACTION     • CERVICAL CORPECTOMY N/A 3/3/2021    Procedure: CERVICAL 6 CORPECTOMY WITH TITANIUM CAGE WITH NEURO MONITORING;  Surgeon: Bandar Shea MD;  Location: [facility];  Service: Neurosurgery;  Laterality: N/A;   • COLONOSCOPY  11/08/2011    One fold in the ascending colon which showed ulcer otherwise normal exam   • COLONOSCOPY  11/12/2004    Normal exam repeat in five years   • CORONARY ANGIOPLASTY WITH STENT PLACEMENT      X 2; 2013 & 2014   • ENDOSCOPY  07/10/2014    Normal exam   • HIP ABDUCTION TENOTOMY BILATERAL Right 1/14/2021    Procedure: RIGHT HIP GLUTEUS MEDLUS / MINIMUS  REPAIR, POSSIBLE ACHILLES ALLOGRAFT;  Surgeon: Nino Carlson MD;  Location:  [facility];  Service: Orthopedics;  Laterality: Right;   • INCISION AND DRAINAGE HIP Right 2/9/2021    Procedure: HIP INCISION AND DRAINAGE;  Surgeon: Nino Carlson MD;  Location:  [facility];  Service: Orthopedics;  Laterality: Right;   • INCISION AND DRAINAGE OF WOUND Right 7/8/2021    Procedure: INCISION AND DRAINAGE WOUND RIGHT HIP;  Surgeon: James Huntley MD;  Location:  [facility];  Service: Orthopedics;  Laterality: Right;   • JOINT REPLACEMENT     • LUMBAR DISCECTOMY Right 3/23/2021    Procedure: LUMBAR DISCECTOMY MICRO, Lumbar 1/2 right;  Surgeon: Bandar Shea MD;  Location:  [facility];  Service: Neurosurgery;  Laterality: Right;   • LUMBAR FUSION N/A 1/19/2018    Procedure: L3-4,L4-5 DECOMPRESSION, POSTERIOR SPINAL FUSION WITH INSTRUMENTATION;  Surgeon: Fortino Oropeza MD;  Location: [facility] OR;  Service:    • LUMBAR LAMINECTOMY WITH FUSION Left 1/17/2018    Procedure: LEFT L3-4 L4-5 LATERAL LUMBAR INTERBODY FUSION;  Surgeon: Fortino Oropeza MD;  Location: [facility] OR;  Service:    • MYRINGOTOMY W/ TUBES  09/04/2014    TUBES NO LONGER IN PLACE   • OTHER SURGICAL HISTORY      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 so hardware was removed and spacers were placed   • REPLACEMENT TOTAL KNEE Right             OT ASSESSMENT FLOWSHEET (last 12 hours)      OT Evaluation and Treatment     Row Name 07/10/21 0756                   OT Time and Intention    Subjective Information  complains of;weakness;fatigue;pain  -        Document Type  therapy note (daily note)  -        Mode of Treatment  occupational therapy  -        Patient Effort  good  -           General Information    Existing Precautions/Restrictions  fall  -           Pain Assessment    Additional Documentation  Pain Scale: Word Pre/Post-Treatment (Group)  -           Pain Scale: Numbers Pre/Post-Treatment    Pretreatment Pain Rating  2/10  -CJ         Posttreatment Pain Rating  2/10  -        Pain Location - Side  Right  -        Pain Location  hip  -CJ        Pain Intervention(s)  Rest  -           Bed Mobility    Comment (Bed Mobility)  fowlers in bed!  -           Motor Skills    Motor Skills  therapeutic exercise  -        Therapeutic Exercise  shoulder;elbow/forearm  -           Shoulder (Therapeutic Exercise)    Shoulder (Therapeutic Exercise)  AROM (active range of motion);strengthening exercise  -        Shoulder AROM (Therapeutic Exercise)  bilateral;flexion;extension;sitting;10 repetitions  -        Shoulder Strengthening (Therapeutic Exercise)  bilateral;flexion;extension;sitting;2 lb free weight;3 lb free weight  -           Elbow/Forearm (Therapeutic Exercise)    Elbow/Forearm (Therapeutic Exercise)  AROM (active range of motion);strengthening exercise  -        Elbow/Forearm AROM (Therapeutic Exercise)  bilateral;flexion;extension;sitting;10 repetitions;2 sets  -        Elbow/Forearm Strengthening (Therapeutic Exercise)  bilateral;flexion;extension;sitting;2 lb free weight;3 lb free weight  -           Wound 02/09/21 1715 Right hip Incision    Wound - Properties Group Placement Date: 02/09/21  - Placement Time: 1715  -SH Present on Hospital Admission: Y  -SH Side: Right  -SH Location: hip  -SH Primary Wound Type: Incision  -SH    Retired Wound - Properties Group Date first assessed: 02/09/21  -SH Time first assessed: 1715  -SH Present on Hospital Admission: Y  -SH Side: Right  -SH Location: hip  -SH Primary Wound Type: Incision  -SH       Positioning and Restraints    Pre-Treatment Position  in bed  -        Post Treatment Position  bed  -        In Bed  fowlers;call light within reach;encouraged to call for assist;side rails up x2  -           Progress Summary (OT)    Progress Toward Functional Goals (OT)  progress toward functional goals is good  -        Barriers to Overall Progress (OT)  Fall/pain!  -         Impairments Still Limiting Function (OT)  continue with ot poc!  -          User Key  (r) = Recorded By, (t) = Taken By, (c) = Cosigned By    Initials Name Effective Dates    Mahendra Prescott, GALLITO/L 06/16/21 -     Laurie Ritter RN 01/02/19 - 06/15/21         Occupational Therapy Education                 Title: PT OT SLP Therapies (In Progress)     Topic: Occupational Therapy (Done)     Point: ADL training (Done)     Description:   Instruct learner(s) on proper safety adaptation and remediation techniques during self care or transfers.   Instruct in proper use of assistive devices.              Learning Progress Summary           Patient Acceptance, E,TB, VU by CE at 7/9/2021 1048    Comment: pt educated on body positioning during ADL tasks, maintaining safety with ADL tasks here and at home, benefits of OOB activity    Acceptance, E, VU,NR by  at 7/7/2021 1442                   Point: Home exercise program (Done)     Description:   Instruct learner(s) on appropriate technique for monitoring, assisting and/or progressing therapeutic exercises/activities.              Learning Progress Summary           Patient Acceptance, E,TB, VU,NR,DU by RIVAS at 7/10/2021 0756    Comment: Pt. completed ue exs to increase her transfers and amb!    Acceptance, E,TB, VU by CE at 7/9/2021 1048    Comment: pt educated on body positioning during ADL tasks, maintaining safety with ADL tasks here and at home, benefits of OOB activity    Acceptance, E, VU,NR by AC at 7/7/2021 1442                   Point: Body mechanics (Done)     Description:   Instruct learner(s) on proper positioning and spine alignment during self-care, functional mobility activities and/or exercises.              Learning Progress Summary           Patient Acceptance, E,TB, VU,NR,DU by RIVAS at 7/10/2021 0756    Comment: Pt. completed ue exs to increase her transfers and amb!    Acceptance, E,TB, VU by CE at 7/9/2021 1048    Comment: pt educated on body  positioning during ADL tasks, maintaining safety with ADL tasks here and at home, benefits of OOB activity    Acceptance, E, VU,NR by  at 7/7/2021 1442                               User Key     Initials Effective Dates Name Provider Type Discipline     04/09/19 -  Sebastián Howe, OTR/L, MARI Occupational Therapist OT     06/16/21 -  Mahendra Elizalde COTA/L Occupational Therapy Assistant OT     05/10/21 -  Vida Weaver OT Student OT Student OT                  OT Recommendation and Plan     Progress Toward Functional Goals (OT): progress toward functional goals is good  Plan of Care Review  Plan of Care Reviewed With: patient  Progress: improving  Outcome Summary: To increase her amb/transfers, pt. performed ue exs! Pt. refused shower today!  Plan of Care Reviewed With: patient  Outcome Summary: To increase her amb/transfers, pt. performed ue exs! Pt. refused shower today!    Outcome Measures     Row Name 07/10/21 0756 07/07/21 1356          How much help from another is currently needed...    Putting on and taking off regular lower body clothing?  3  -  2  -AC     Bathing (including washing, rinsing, and drying)  2  -  2  -AC     Toileting (which includes using toilet bed pan or urinal)  3  -  2  -AC     Putting on and taking off regular upper body clothing  4  -  4  -AC     Taking care of personal grooming (such as brushing teeth)  4  -  4  -AC     Eating meals  4  -  4  -AC     AM-PAC 6 Clicks Score (OT)  20  -  18  -AC        Functional Assessment    Outcome Measure Options  AM-PAC 6 Clicks Daily Activity (OT)  -  AM-PAC 6 Clicks Daily Activity (OT)  -       User Key  (r) = Recorded By, (t) = Taken By, (c) = Cosigned By    Initials Name Provider Type    AC Sebastián Howe, OTR/L, MARI Occupational Therapist     Mahendra lEizalde COTA/L Occupational Therapy Assistant          Time Calculation:   Time Calculation- OT     Row Name 07/10/21 1033 07/10/21 0756          Time  Calculation- OT    OT Start Time  --  0756  -     OT Stop Time  --  0823  -     OT Time Calculation (min)  --  27 min  -     Total Timed Code Minutes- OT  --  27 minute(s)  -     OT Received On  --  07/10/21  -        Timed Charges    96141 - OT Therapeutic Exercise Minutes  --  27 -CJ     97572 - Gait Training Minutes   23  -NW  --        Total Minutes    Timed Charges Total Minutes  23  -NW 27  -      Total Minutes  23  -NW 27  -CJ       User Key  (r) = Recorded By, (t) = Taken By, (c) = Cosigned By    Initials Name Provider Type     Mahendra Elizalde COTA/L Occupational Therapy Assistant    NW Angle Nagel, SERENA Physical Therapy Assistant        Therapy Charges for Today     Code Description Service Date Service Provider Modifiers Qty    60384876820 HC OT THER PROC EA 15 MIN 7/10/2021 Mahendra Elizalde COTA/L GO 2               GUALBERTO Garcia  7/10/2021

## 2021-07-10 NOTE — THERAPY TREATMENT NOTE
Acute Care - Physical Therapy Treatment Note  [facility]     Patient Name: Tawny Shin  : 1955  MRN: [id number]  Today's Date: 7/10/2021   Onset of Illness/Injury or Date of Surgery: 21       PT Assessment (last 12 hours)      PT Evaluation and Treatment     Row Name 07/10/21 1013          Physical Therapy Time and Intention    Subjective Information  complains of;pain  -NW     Document Type  therapy note (daily note)  -NW     Mode of Treatment  physical therapy  -NW     Patient Effort  good  -NW     Row Name 07/10/21 1013          General Information    Existing Precautions/Restrictions  fall  -NW     Row Name 07/10/21 1013          Pain Scale: Numbers Pre/Post-Treatment    Pretreatment Pain Rating  210  -NW     Posttreatment Pain Rating  3/10  -NW     Pain Location - Side  Right  -NW     Pain Location  hip  -     Row Name 07/10/21 1013          Bed Mobility    Comment (Bed Mobility)  chair  -     Row Name 07/10/21 1013          Transfers    Sit-Stand [place] (Transfers)  verbal cues;contact guard  -NW     Stand-Sit [place] (Transfers)  verbal cues;contact guard  -NW     [place] Level (Toilet Transfer)  verbal cues;contact guard  -NW     Row Name 07/10/21 1013          Sit-Stand Transfer    Assistive Device (Sit-Stand Transfers)  walker, front-wheeled  -NW     Row Name 07/10/21 1013          Stand-Sit Transfer    Assistive Device (Stand-Sit Transfers)  walker, front-wheeled  -NW     Row Name 07/10/21 1013          Gait/Stairs (Locomotion)    [place] Level (Gait)  verbal cues;contact guard  -NW     Assistive Device (Gait)  walker, front-wheeled  -     Distance in Feet (Gait)  100x2  -NW     Pattern (Gait)  step-to;step-through  -NW     Row Name 07/10/21 1013          Safety Issues, Functional Mobility    Impairments Affecting Function (Mobility)  balance;cognition  -NW     Cognitive Impairments, Mobility Safety/Performance  judgment  -     Row Name             Wound  02/09/21 1715 Right hip Incision    Wound - Properties Group Placement Date: 02/09/21  - Placement Time: 1715  -SH Present on Hospital Admission: Y  -SH Side: Right  -SH Location: hip  -SH Primary Wound Type: Incision  -SH    Retired Wound - Properties Group Date first assessed: 02/09/21  -SH Time first assessed: 1715  -SH Present on Hospital Admission: Y  -SH Side: Right  -SH Location: hip  -SH Primary Wound Type: Incision  -SH    Row Name 07/10/21 1013          Positioning and Restraints    Pre-Treatment Position  sitting in chair/recliner  -NW     Post Treatment Position  chair  -NW     In Chair  reclined;call light within reach;encouraged to call for assist  -NW       User Key  (r) = Recorded By, (t) = Taken By, (c) = Cosigned By    Initials Name Provider Type    NW Angle Nagel PTA Physical Therapy Assistant    SH Laurie Herrera, RN Registered Nurse        Physical Therapy Education                 Title: PT OT SLP Therapies (In Progress)     Topic: Physical Therapy (In Progress)     Point: Mobility training (Done)     Learning Progress Summary           Patient Acceptance, E, VU by KINGS at 7/8/2021 0831    Comment: progression with amb    Acceptance, E, VU by MS at 7/7/2021 1511    Comment: role of PT in her care                   Point: Home exercise program (Not Started)     Learner Progress:  Not documented in this visit.          Point: Body mechanics (Not Started)     Learner Progress:  Not documented in this visit.          Point: Precautions (Not Started)     Learner Progress:  Not documented in this visit.                      User Key     Initials Effective Dates Name Provider Type Discipline    MS 06/19/18 -  Nicole Olson, PT, DPT, NCS Physical Therapist PT    KINGS 12/08/16 -  Garcia Francis PTA Physical Therapy Assistant PT              PT Recommendation and Plan     Plan of Care Reviewed With: patient  Progress: improving  Outcome Summary: Pt up in chair. sit-stand cga x1. Pt was able to  amb 100'x2 rwx cga slow pace cues for safety. Reviewed w/ pt POC. Pt would benefit from cont PT for strength, mobility and safety awareness concerns.  Outcome Measures     Row Name 07/07/21 1356             How much help from another is currently needed...    Putting on and taking off regular lower body clothing?  2  -AC      Bathing (including washing, rinsing, and drying)  2  -AC      Toileting (which includes using toilet bed pan or urinal)  2  -AC      Putting on and taking off regular upper body clothing  4  -AC      Taking care of personal grooming (such as brushing teeth)  4  -AC      Eating meals  4  -AC      AM-PAC 6 Clicks Score (OT)  18  -AC         Functional Assessment    Outcome Measure Options  AM-PAC 6 Clicks Daily Activity (OT)  -AC        User Key  (r) = Recorded By, (t) = Taken By, (c) = Cosigned By    Initials Name Provider Type    AC Sebastián Howe, OTR/L, CNT Occupational Therapist           Time Calculation:   PT Charges     Row Name 07/10/21 1033             Time Calculation    Start Time  1013  -NW      Stop Time  1036  -NW      Time Calculation (min)  23 min  -NW      PT Received On  07/10/21  -NW      PT Goal Re-Cert Due Date  07/17/21  -NW         Time Calculation- PT    Total Timed Code Minutes- PT  23 minute(s)  -NW         Timed Charges    44742 - Gait Training Minutes   23  -NW         Total Minutes    Timed Charges Total Minutes  23  -NW       Total Minutes  23  -NW        User Key  (r) = Recorded By, (t) = Taken By, (c) = Cosigned By    Initials Name Provider Type    NW Angle Nagel PTA Physical Therapy Assistant        Therapy Charges for Today     Code Description Service Date Service Provider Modifiers Qty    [id number] HC GAIT TRAINING EA 15 MIN 7/10/2021 Angle Nagel PTA GP 2          PT G-Codes  Outcome Measure Options: AM-PAC 6 Clicks Daily Activity (OT)  AM-PAC 6 Clicks Score (PT): 16  AM-PAC 6 Clicks Score (OT): 20    Angle Nagel PTA  7/10/2021

## 2021-07-10 NOTE — PROGRESS NOTES
"Pharmacy Dosing Service  Pharmacokinetics  Vancomycin Follow-up Evaluation    Assessment/Action/Plan:  Current Order: Vancomycin 1000 mg IVPB every 12 hours  Current end date:7/16/21  Levels: 7/10/21 trough resulted in 17.3 (~10.5 hours post-dose)  Additional antimicrobial agent(s): meropenem    Patient not at steady state at this time. Will preemptively decrease dose to 750 mg IV every 12 hours.  Pharmacy will continue to follow daily and adjust dose accordingly.     AUC Model Data:  Regimen: 750 mg IV every 12 hours.  Start time: 17:05 on 07/10/2021  Exposure target: AUC24 (range)400-600 mg/L.hr   AUC24,ss: 490 mg/L.hr  PAUC*: 86 %  Ctrough,ss: 16.2 mg/L  Pconc*: 19 %  Tox.: 12 %    Subjective:   Tawny Shin is a 65 y.o. female currently on Vancomycin 750 mg IV every 12 hours for the treatment of SSTI, day 2 of 7 of treatment.    Objective:  Ht: 167.6 cm (65.98\"); Wt: 89.9 kg (198 lb 1.6 oz)  Estimated Creatinine Clearance: 77 mL/min (by C-G formula based on SCr of 0.75 mg/dL).   Creatinine   Date Value Ref Range Status   07/09/2021 0.75 0.57 - 1.00 mg/dL Final   07/07/2021 0.77 0.57 - 1.00 mg/dL Final   07/06/2021 0.90 0.57 - 1.00 mg/dL Final   07/08/2020 0.90 0.60 - 1.30 mg/dL Final     Comment:     Serial Number: [id number]:  [id number]   09/03/2019 0.7 0.5 - 0.9 mg/dL Final   01/14/2019 0.5 0.5 - 0.9 mg/dL Final   01/07/2019 0.6 0.5 - 0.9 mg/dL Final      Lab Results   Component Value Date    WBC 4.23 07/07/2021    WBC 5.16 07/06/2021    WBC 5.16 06/23/2021         Lab Results   Component Value Date    VANCOTROUGH 17.30 07/10/2021       Culture Results:  Microbiology Results (last 10 days)       Procedure Component Value - Date/Time    Tissue / Bone Culture - Bone, Hip, Right [[id number]] Collected: 07/08/21 1859    Lab Status: Preliminary result Specimen: Bone from Hip, Right Updated: 07/10/21 0901     Tissue Culture No growth at 2 days     Gram Stain Few (2+) WBCs seen      No organisms seen    Tissue / " Bone Culture - Tissue, Hip, Right [[id number]]  (Abnormal) Collected: 07/08/21 1858    Lab Status: Preliminary result Specimen: Tissue from Hip, Right Updated: 07/10/21 0933     Tissue Culture Scant growth (1+) Pseudomonas species     Gram Stain Moderate (3+) WBCs seen      Few (2+) Gram negative bacilli    Blood Culture - Blood, Arm, Right [[id number]] Collected: 07/06/21 2052    Lab Status: Preliminary result Specimen: Blood from Arm, Right Updated: 07/09/21 2130     Blood Culture No growth at 3 days    COVID PRE-OP / PRE-PROCEDURE SCREENING ORDER (NO ISOLATION) - Swab, Nasal Cavity [[id number]]  (Normal) Collected: 07/06/21 2052    Lab Status: Final result Specimen: Swab from Nasal Cavity Updated: 07/06/21 2140    Narrative:      The following orders were created for panel order COVID PRE-OP / PRE-PROCEDURE SCREENING ORDER (NO ISOLATION) - Swab, Nasal Cavity.  Procedure                               Abnormality         Status                     ---------                               -----------         ------                     COVID-19,[facility]...[[id number]]  Normal              Final result                 Please view results for these tests on the individual orders.    COVID-19,[facility] IN-HOUSE,Nasal Swab No Transport Media 3-4 HR TAT - Swab, Nasal Cavity [[id number]]  (Normal) Collected: 07/06/21 2052    Lab Status: Final result Specimen: Swab from Nasal Cavity Updated: 07/06/21 2140     COVID19 Not Detected    Narrative:      Fact sheet for providers: https://www.fda.gov/media/748217/download     Fact sheet for patients: https://www.fda.gov/media/638195/download    Test performed by PCR.    Consider negative results in combination with clinical observations, patient history, and epidemiological information.    Urine Culture - Urine, Urine, Clean Catch [[id number]] Collected: 07/06/21 2040    Lab Status: Final result Specimen: Urine, Clean Catch Updated: 07/08/21 1109     Urine Culture >100,000 CFU/mL  Mixed Brett Isolated    Narrative:      Specimen contains mixed organisms of questionable pathogenicity which indicates contamination with commensal brett.  Further identification is unlikely to provide clinically useful information.  Suggest recollection.    Blood Culture - Blood, Arm, Right [[id number]] Collected: 07/06/21 1946    Lab Status: Preliminary result Specimen: Blood from Arm, Right Updated: 07/09/21 2000     Blood Culture No growth at 3 days          Antimicrobials:  Anti-Infectives (From admission, onward)      Ordered     Dose/Rate Route Frequency Start Stop    07/10/21 1608  vancomycin 750 mg/250 mL 0.9% NS IVPB ([facility])     Ordering Provider: Elie Copeland MD    750 mg  over 60 Minutes Intravenous Every 12 Hours 07/10/21 1700 07/16/21 1659    07/09/21 1453  meropenem (MERREM) 1 g/100 mL 0.9% NS VTB ([facility])     Ordering Provider: Elie Copeland MD    1 g  over 3 Hours Intravenous Every 8 Hours 07/09/21 2200 07/16/21 2159    07/09/21 1453  meropenem (MERREM) 1 g/100 mL 0.9% NS VTB ([facility])     Ordering Provider: Elie Copeland MD    1 g  over 30 Minutes Intravenous Once 07/09/21 1600 07/09/21 1648    07/06/21 2315  valACYclovir (VALTREX) tablet 500 mg     Ordering Provider: James Huntley MD    500 mg Oral Every 24 Hours Scheduled 07/07/21 0900 02/14/22 0859    07/06/21 2032  aztreonam (AZACTAM) 2 g/100 mL 0.9% NS ([facility])     Ordering Provider: Shaheed Shaw MD    2 g  over 30 Minutes Intravenous Once 07/06/21 2034 07/06/21 2219    07/06/21 2032  vancomycin 1750 mg/500 mL 0.9% NS IVPB ([facility])     Ordering Provider: Shaheed Shaw MD    20 mg/kg × 86.2 kg  over 3 Hours Intravenous Once 07/06/21 2034 07/07/21 0234            Esequiel Longoria PharmD   07/10/21 16:08 CDT

## 2021-07-10 NOTE — PLAN OF CARE
Goal Outcome Evaluation:  Plan of Care Reviewed With: patient        Progress: improving  Outcome Summary: Pt up in chair. sit-stand cga x1. Pt was able to amb 100'x2 rwx cga slow pace cues for safety. Reviewed w/ pt POC. Pt would benefit from cont PT for strength, mobility and safety awareness concerns.

## 2021-07-10 NOTE — PLAN OF CARE
Problem: Adult Inpatient Plan of Care  Goal: Plan of Care Review  Outcome: Ongoing, Progressing  Flowsheets (Taken 7/10/2021 1666)  Progress: improving  Plan of Care Reviewed With: patient  Outcome Summary: To increase her amb/transfers, pt. performed ue exs! Pt. refused shower today!   Goal Outcome Evaluation:  Plan of Care Reviewed With: patient        Progress: improving  Outcome Summary: To increase her amb/transfers, pt. performed ue exs! Pt. refused shower today!

## 2021-07-11 LAB
ABO GROUP BLD: NORMAL
ANION GAP SERPL CALCULATED.3IONS-SCNC: 8 MMOL/L (ref 5–15)
BACTERIA SPEC AEROBE CULT: ABNORMAL
BACTERIA SPEC AEROBE CULT: NORMAL
BLD GP AB SCN SERPL QL: NEGATIVE
BUN SERPL-MCNC: 13 MG/DL (ref 8–23)
BUN/CREAT SERPL: 16.9 (ref 7–25)
CALCIUM SPEC-SCNC: 8.6 MG/DL (ref 8.6–10.5)
CHLORIDE SERPL-SCNC: 105 MMOL/L (ref 98–107)
CO2 SERPL-SCNC: 29 MMOL/L (ref 22–29)
CREAT SERPL-MCNC: 0.77 MG/DL (ref 0.57–1)
GFR SERPL CREATININE-BSD FRML MDRD: 75 ML/MIN/1.73
GLUCOSE SERPL-MCNC: 95 MG/DL (ref 65–99)
GRAM STN SPEC: ABNORMAL
GRAM STN SPEC: ABNORMAL
GRAM STN SPEC: NORMAL
GRAM STN SPEC: NORMAL
HCT VFR BLD AUTO: 26.3 % (ref 34–46.6)
HGB BLD-MCNC: 8.3 G/DL (ref 12–15.9)
POTASSIUM SERPL-SCNC: 3.7 MMOL/L (ref 3.5–5.2)
RH BLD: POSITIVE
SODIUM SERPL-SCNC: 142 MMOL/L (ref 136–145)
T&S EXPIRATION DATE: NORMAL

## 2021-07-11 PROCEDURE — 25010000002 MEROPENEM PER 100 MG: Performed by: INTERNAL MEDICINE

## 2021-07-11 PROCEDURE — 85014 HEMATOCRIT: CPT | Performed by: INTERNAL MEDICINE

## 2021-07-11 PROCEDURE — 97110 THERAPEUTIC EXERCISES: CPT

## 2021-07-11 PROCEDURE — 97116 GAIT TRAINING THERAPY: CPT

## 2021-07-11 PROCEDURE — 86901 BLOOD TYPING SEROLOGIC RH(D): CPT | Performed by: INTERNAL MEDICINE

## 2021-07-11 PROCEDURE — 80048 BASIC METABOLIC PNL TOTAL CA: CPT | Performed by: INTERNAL MEDICINE

## 2021-07-11 PROCEDURE — C1751 CATH, INF, PER/CENT/MIDLINE: HCPCS

## 2021-07-11 PROCEDURE — 85018 HEMOGLOBIN: CPT | Performed by: INTERNAL MEDICINE

## 2021-07-11 PROCEDURE — 25010000002 CEFEPIME PER 500 MG: Performed by: INTERNAL MEDICINE

## 2021-07-11 PROCEDURE — 05HY33Z INSERTION OF INFUSION DEVICE INTO UPPER VEIN, PERCUTANEOUS APPROACH: ICD-10-PCS | Performed by: INTERNAL MEDICINE

## 2021-07-11 PROCEDURE — 86900 BLOOD TYPING SEROLOGIC ABO: CPT | Performed by: INTERNAL MEDICINE

## 2021-07-11 PROCEDURE — 86850 RBC ANTIBODY SCREEN: CPT | Performed by: INTERNAL MEDICINE

## 2021-07-11 RX ORDER — LIDOCAINE HYDROCHLORIDE 10 MG/ML
1 INJECTION, SOLUTION EPIDURAL; INFILTRATION; INTRACAUDAL; PERINEURAL ONCE
Status: COMPLETED | OUTPATIENT
Start: 2021-07-11 | End: 2021-07-11

## 2021-07-11 RX ADMIN — VALACYCLOVIR 500 MG: 500 TABLET, FILM COATED ORAL at 08:50

## 2021-07-11 RX ADMIN — SODIUM CHLORIDE, PRESERVATIVE FREE 10 ML: 5 INJECTION INTRAVENOUS at 08:53

## 2021-07-11 RX ADMIN — SODIUM CHLORIDE, PRESERVATIVE FREE 3 ML: 5 INJECTION INTRAVENOUS at 08:53

## 2021-07-11 RX ADMIN — CEFEPIME HYDROCHLORIDE 2 G: 2 INJECTION, POWDER, FOR SOLUTION INTRAVENOUS at 21:02

## 2021-07-11 RX ADMIN — FUROSEMIDE 40 MG: 40 TABLET ORAL at 08:48

## 2021-07-11 RX ADMIN — GABAPENTIN 300 MG: 300 CAPSULE ORAL at 21:02

## 2021-07-11 RX ADMIN — ISOSORBIDE MONONITRATE 60 MG: 60 TABLET, EXTENDED RELEASE ORAL at 08:50

## 2021-07-11 RX ADMIN — TRAMADOL HYDROCHLORIDE 50 MG: 50 TABLET, FILM COATED ORAL at 17:26

## 2021-07-11 RX ADMIN — Medication 250 MG: at 08:47

## 2021-07-11 RX ADMIN — CEFEPIME HYDROCHLORIDE 2 G: 2 INJECTION, POWDER, FOR SOLUTION INTRAVENOUS at 13:31

## 2021-07-11 RX ADMIN — SUCRALFATE 1 G: 1 TABLET ORAL at 13:31

## 2021-07-11 RX ADMIN — LEVOTHYROXINE SODIUM 75 MCG: 75 TABLET ORAL at 05:35

## 2021-07-11 RX ADMIN — FLUTICASONE PROPIONATE 1 SPRAY: 50 SPRAY, METERED NASAL at 08:53

## 2021-07-11 RX ADMIN — SUCRALFATE 1 G: 1 TABLET ORAL at 17:26

## 2021-07-11 RX ADMIN — ISOSORBIDE MONONITRATE 60 MG: 60 TABLET, EXTENDED RELEASE ORAL at 21:02

## 2021-07-11 RX ADMIN — PANTOPRAZOLE SODIUM 40 MG: 40 TABLET, DELAYED RELEASE ORAL at 05:35

## 2021-07-11 RX ADMIN — LIDOCAINE HYDROCHLORIDE ANHYDROUS 1 ML: 10 INJECTION, SOLUTION INFILTRATION at 14:58

## 2021-07-11 RX ADMIN — POLYETHYLENE GLYCOL (3350) 17 G: 17 POWDER, FOR SOLUTION ORAL at 08:50

## 2021-07-11 RX ADMIN — SUCRALFATE 1 G: 1 TABLET ORAL at 08:48

## 2021-07-11 RX ADMIN — LIDOCAINE 1 PATCH: 50 PATCH CUTANEOUS at 08:56

## 2021-07-11 RX ADMIN — POTASSIUM CHLORIDE 20 MEQ: 750 CAPSULE, EXTENDED RELEASE ORAL at 08:47

## 2021-07-11 RX ADMIN — CARVEDILOL 25 MG: 25 TABLET, FILM COATED ORAL at 08:49

## 2021-07-11 RX ADMIN — TRAMADOL HYDROCHLORIDE 50 MG: 50 TABLET, FILM COATED ORAL at 21:02

## 2021-07-11 RX ADMIN — TRAMADOL HYDROCHLORIDE 50 MG: 50 TABLET, FILM COATED ORAL at 08:50

## 2021-07-11 RX ADMIN — Medication 400 MG: at 08:48

## 2021-07-11 RX ADMIN — Medication 1 TABLET: at 08:47

## 2021-07-11 RX ADMIN — SODIUM CHLORIDE, PRESERVATIVE FREE 10 ML: 5 INJECTION INTRAVENOUS at 21:02

## 2021-07-11 RX ADMIN — FUROSEMIDE 40 MG: 40 TABLET ORAL at 17:25

## 2021-07-11 RX ADMIN — CARVEDILOL 25 MG: 25 TABLET, FILM COATED ORAL at 17:26

## 2021-07-11 RX ADMIN — DOCUSATE SODIUM 100 MG: 100 CAPSULE ORAL at 21:05

## 2021-07-11 RX ADMIN — SUCRALFATE 1 G: 1 TABLET ORAL at 21:02

## 2021-07-11 RX ADMIN — APIXABAN 5 MG: 5 TABLET, FILM COATED ORAL at 08:48

## 2021-07-11 RX ADMIN — MEROPENEM 1 G: 1 INJECTION, POWDER, FOR SOLUTION INTRAVENOUS at 05:35

## 2021-07-11 RX ADMIN — SODIUM CHLORIDE, PRESERVATIVE FREE 3 ML: 5 INJECTION INTRAVENOUS at 21:04

## 2021-07-11 RX ADMIN — DOCUSATE SODIUM 100 MG: 100 CAPSULE ORAL at 08:48

## 2021-07-11 RX ADMIN — APIXABAN 5 MG: 5 TABLET, FILM COATED ORAL at 21:02

## 2021-07-11 NOTE — PLAN OF CARE
Goal Outcome Evaluation:  Plan of Care Reviewed With: patient        Progress: no change  Outcome Summary: Pt up in chair. sit-stand cga x1 Pt able to amb 100'x2 rwx anatalgic fwd flex gait requiring standing rest and cues for safety. BLE swelling reinforced BLE elevation. Pt will need cont PT upon d/c for strength, mobility and safety

## 2021-07-11 NOTE — THERAPY TREATMENT NOTE
Acute Care - Occupational Therapy Treatment Note  [facility]     Patient Name: Tawny Shin  : 1955  MRN: [id number]  Today's Date: 2021  Onset of Illness/Injury or Date of Surgery: 21  Date of Referral to OT: 21  Referring Physician: Dr. Hawley    Admit Date: 2021       ICD-10-CM ICD-9-CM   1. Osteomyelitis of right femur, unspecified type (CMS/[facility])  M86.9 730.25   2. Altered mental status, unspecified altered mental status type  R41.82 780.97   3. Decreased activities of daily living (ADL)  Z78.9 V49.89   4. Decreased functional activity tolerance  R68.89 780.99   5. Osteomyelitis (CMS/[facility])  M86.9 730.20     Patient Active Problem List   Diagnosis   • Eustachian tube dysfunction   • Sensorineural hearing loss   • Spondylolisthesis of lumbar region   • Coronary artery disease   • Hyperlipidemia   • Hypertension   • Myalgia due to statin   • S/P coronary artery stent placement   • Pacemaker   • Seropositive rheumatoid arthritis of multiple sites (CMS/[facility])   • Sick sinus syndrome (CMS/[facility])   • Age-related osteoporosis with current pathological fracture   • Allergic-infective asthma   • Arthritis of both knees   • Vasovagal syncope   • Bilateral bunions   • Bronchitis   • Cardiac pacemaker syndrome   • Charcot's joint of foot, left   • Constipation   • 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   • Intrinsic asthma   • Left carotid bruit   • Neutropenia (CMS/[facility])   • Primary osteoarthritis of left knee   • Psoriasis vulgaris   • Recurrent acute serous otitis media of both ears   • Thrombocytopenia (CMS/[facility])   • Hypothyroidism   • Vitamin D deficiency   • Myxedema heart disease   • Syncope, cardiogenic   • Rupture of gluteus minimus tendon   • Muscle tear   • Syncope, recurrent   • Leukocytosis   • Headache   • Elevated CPK   • Staphylococcus aureus bacteremia   • Right hip pain, suspected infection   • Obesity (BMI 30-39.9)   • Stenosis of cervical spine with myelopathy (CMS/HCC)   • Spinal  stenosis, lumbar region, with neurogenic claudication   • Osteomyelitis of lumbar spine (CMS/HCC)   • Iliopsoas abscess (CMS/HCC)   • Closed wedge compression fracture of T12 vertebra (CMS/HCC)   • Concussion with no loss of consciousness   • Fall as cause of accidental injury at home as place of occurrence   • Chronic pain syndrome   • Hypokalemia   • Bilateral lower extremity edema   • Chronic deep vein thrombosis (DVT) of right lower extremity (CMS/HCC)   • Closed fracture of ankle   • Closed fracture of lateral malleolus   • Closed fracture of shaft of metatarsal bone   • Osteomyelitis of right femur (CMS/HCC)   • Transient alteration of awareness   • Non-healing surgical wound, right hip     Past Medical History:   Diagnosis Date   • Arthritis    • Asthma    • Chronic sinusitis    • Coronary artery disease    • Eustachian tube dysfunction    • Heart disease    • Herpes simplex    • Hyperlipidemia    • Hypertension    • Knee dislocation    • Labral tear of right hip joint    • Laryngitis sicca    • Laryngitis, chronic    • MI (myocardial infarction) (CMS/HCC)    • Otorrhea    • Sensorineural hearing loss    • Sjogren's disease (CMS/HCC)      Past Surgical History:   Procedure Laterality Date   • A-V CARDIAC PACEMAKER INSERTION  2016   • ATRIAL CARDIAC PACEMAKER INSERTION     • CARDIAC CATHETERIZATION     • CATARACT EXTRACTION     • CERVICAL CORPECTOMY N/A 3/3/2021    Procedure: CERVICAL 6 CORPECTOMY WITH TITANIUM CAGE WITH NEURO MONITORING;  Surgeon: Bandar Shea MD;  Location: [facility];  Service: Neurosurgery;  Laterality: N/A;   • COLONOSCOPY  11/08/2011    One fold in the ascending colon which showed ulcer otherwise normal exam   • COLONOSCOPY  11/12/2004    Normal exam repeat in five years   • CORONARY ANGIOPLASTY WITH STENT PLACEMENT      X 2; 2013 & 2014   • ENDOSCOPY  07/10/2014    Normal exam   • HIP ABDUCTION TENOTOMY BILATERAL Right 1/14/2021    Procedure: RIGHT HIP GLUTEUS MEDLUS / MINIMUS  REPAIR, POSSIBLE ACHILLES ALLOGRAFT;  Surgeon: Nino Carlson MD;  Location:  [facility] OR;  Service: Orthopedics;  Laterality: Right;   • INCISION AND DRAINAGE HIP Right 2/9/2021    Procedure: HIP INCISION AND DRAINAGE;  Surgeon: Nino Carlson MD;  Location:  [facility] OR;  Service: Orthopedics;  Laterality: Right;   • INCISION AND DRAINAGE OF WOUND Right 7/8/2021    Procedure: INCISION AND DRAINAGE WOUND RIGHT HIP;  Surgeon: James Huntley MD;  Location:  [facility] OR;  Service: Orthopedics;  Laterality: Right;   • JOINT REPLACEMENT     • LUMBAR DISCECTOMY Right 3/23/2021    Procedure: LUMBAR DISCECTOMY MICRO, Lumbar 1/2 right;  Surgeon: Bandar Shea MD;  Location:  [facility] OR;  Service: Neurosurgery;  Laterality: Right;   • LUMBAR FUSION N/A 1/19/2018    Procedure: L3-4,L4-5 DECOMPRESSION, POSTERIOR SPINAL FUSION WITH INSTRUMENTATION;  Surgeon: Fortino Oropeza MD;  Location: [facility] OR;  Service:    • LUMBAR LAMINECTOMY WITH FUSION Left 1/17/2018    Procedure: LEFT L3-4 L4-5 LATERAL LUMBAR INTERBODY FUSION;  Surgeon: Fortino Oropeza MD;  Location: [facility] OR;  Service:    • MYRINGOTOMY W/ TUBES  09/04/2014    TUBES NO LONGER IN PLACE   • OTHER SURGICAL HISTORY      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 so hardware was removed and spacers were placed   • REPLACEMENT TOTAL KNEE Right             OT ASSESSMENT FLOWSHEET (last 12 hours)      OT Evaluation and Treatment     Row Name 07/11/21 0759                   OT Time and Intention    Subjective Information  complains of;weakness;fatigue;pain  -        Document Type  therapy note (daily note)  -        Mode of Treatment  occupational therapy  -        Patient Effort  good  -           General Information    Existing Precautions/Restrictions  fall pain!  -           Pain Assessment    Additional Documentation  Pain Scale: Word Pre/Post-Treatment (Group)  -           Pain Scale: Numbers Pre/Post-Treatment    Pretreatment Pain Rating  2/10  -         Posttreatment Pain Rating  2/10  -        Pain Location - Side  Right  -        Pain Location  hip  -CJ        Pain Intervention(s)  Rest  -           Bed Mobility    Comment (Bed Mobility)  chair!  -           Motor Skills    Motor Skills  therapeutic exercise  -        Therapeutic Exercise  shoulder;elbow/forearm  -           Shoulder (Therapeutic Exercise)    Shoulder (Therapeutic Exercise)  AROM (active range of motion);strengthening exercise  -        Shoulder AROM (Therapeutic Exercise)  bilateral;flexion;extension;sitting;10 repetitions  -        Shoulder Strengthening (Therapeutic Exercise)  bilateral;flexion;extension;sitting;2 lb free weight;3 lb free weight  -           Elbow/Forearm (Therapeutic Exercise)    Elbow/Forearm (Therapeutic Exercise)  AROM (active range of motion);strengthening exercise  -        Elbow/Forearm AROM (Therapeutic Exercise)  bilateral;flexion;extension;sitting;10 repetitions;2 sets  -        Elbow/Forearm Strengthening (Therapeutic Exercise)  bilateral;flexion;extension;sitting;2 lb free weight;3 lb free weight  -           Wound 02/09/21 1715 Right hip Incision    Wound - Properties Group Placement Date: 02/09/21  - Placement Time: 1715  -SH Present on Hospital Admission: Y  -SH Side: Right  -SH Location: hip  -SH Primary Wound Type: Incision  -SH    Retired Wound - Properties Group Date first assessed: 02/09/21  -SH Time first assessed: 1715  -SH Present on Hospital Admission: Y  -SH Side: Right  -SH Location: hip  -SH Primary Wound Type: Incision  -SH       Positioning and Restraints    Pre-Treatment Position  sitting in chair/recliner  -        Post Treatment Position  chair  -        In Chair  reclined;sitting;call light within reach;encouraged to call for assist;legs elevated  -           Progress Summary (OT)    Progress Toward Functional Goals (OT)  progress toward functional goals is good  -        Barriers to Overall Progress (OT)   Fall/pain!  -CJ        Impairments Still Limiting Function (OT)  continue with ot poc!  -CJ          User Key  (r) = Recorded By, (t) = Taken By, (c) = Cosigned By    Initials Name Effective Dates    CJ Mahendra Elizalde, GALLITO/L 06/16/21 -     Laurie Ritter RN 01/02/19 - 06/15/21         Occupational Therapy Education                 Title: PT OT SLP Therapies (In Progress)     Topic: Occupational Therapy (Done)     Point: ADL training (Done)     Description:   Instruct learner(s) on proper safety adaptation and remediation techniques during self care or transfers.   Instruct in proper use of assistive devices.              Learning Progress Summary           Patient Acceptance, E,TB, VU by CE at 7/9/2021 1048    Comment: pt educated on body positioning during ADL tasks, maintaining safety with ADL tasks here and at home, benefits of OOB activity    Acceptance, E, VU,NR by  at 7/7/2021 1442                   Point: Home exercise program (Done)     Description:   Instruct learner(s) on appropriate technique for monitoring, assisting and/or progressing therapeutic exercises/activities.              Learning Progress Summary           Patient Acceptance, E,TB, VU,DU,NR by  at 7/11/2021 0759    Comment: Pt. sitting in chair performed ue exs!    Acceptance, E,TB, VU,NR,DU by  at 7/10/2021 0756    Comment: Pt. completed ue exs to increase her transfers and amb!    Acceptance, E,TB, VU by CE at 7/9/2021 1048    Comment: pt educated on body positioning during ADL tasks, maintaining safety with ADL tasks here and at home, benefits of OOB activity    Acceptance, E, VU,NR by AC at 7/7/2021 1442                   Point: Body mechanics (Done)     Description:   Instruct learner(s) on proper positioning and spine alignment during self-care, functional mobility activities and/or exercises.              Learning Progress Summary           Patient Acceptance, E,TB, VU,DU,NR by  at 7/11/2021 0759    Comment: Pt. sitting  in chair performed ue exs!    Acceptance, E,TB, VU,NR,DU by  at 7/10/2021 0756    Comment: Pt. completed ue exs to increase her transfers and amb!    Acceptance, E,TB, VU by CE at 7/9/2021 1048    Comment: pt educated on body positioning during ADL tasks, maintaining safety with ADL tasks here and at home, benefits of OOB activity    Acceptance, E, VU,NR by  at 7/7/2021 1442                               User Key     Initials Effective Dates Name Provider Type Discipline     04/09/19 -  Sebastián Howe, OTR/L, CNT Occupational Therapist OT     06/16/21 -  Mahendra Elizalde, RODRIGUEZ/L Occupational Therapy Assistant OT    CE 05/10/21 -  Vida Weaver OT Student OT Student OT                  OT Recommendation and Plan     Progress Toward Functional Goals (OT): progress toward functional goals is good  Plan of Care Review  Plan of Care Reviewed With: patient  Progress: improving  Outcome Summary: Pt. first stated she wanted to shower, then decided not to and performed ue exs! This rodriguez/l provided pt. washcloths towel and pan of water!  Plan of Care Reviewed With: patient  Outcome Summary: Pt. first stated she wanted to shower, then decided not to and performed ue exs! This rodriguez/l provided pt. washcloths towel and pan of water!    Outcome Measures     Row Name 07/11/21 0759 07/10/21 0756          How much help from another is currently needed...    Putting on and taking off regular lower body clothing?  3  -CJ  3  -CJ     Bathing (including washing, rinsing, and drying)  2  -CJ  2  -CJ     Toileting (which includes using toilet bed pan or urinal)  3  -CJ  3  -CJ     Putting on and taking off regular upper body clothing  4  -CJ  4  -CJ     Taking care of personal grooming (such as brushing teeth)  4  -CJ  4  -CJ     Eating meals  4  -CJ  4  -CJ     AM-PAC 6 Clicks Score (OT)  20  -CJ  20  -CJ        Functional Assessment    Outcome Measure Options  AM-PAC 6 Clicks Daily Activity (OT)  -CJ  AM-PAC 6 Clicks Daily  Activity (OT)  -       User Key  (r) = Recorded By, (t) = Taken By, (c) = Cosigned By    Initials Name Provider Type    Mahendra Prescott COTA/L Occupational Therapy Assistant          Time Calculation:   Time Calculation- OT     Row Name 07/11/21 1100 07/11/21 0759          Time Calculation- OT    OT Start Time  --  0759  -     OT Stop Time  --  0828  -     OT Time Calculation (min)  --  29 min  -     Total Timed Code Minutes- OT  --  29 minute(s)  -     OT Received On  --  07/11/21  -        Timed Charges    47222 - OT Therapeutic Exercise Minutes  --  29  -CJ     68554 - Gait Training Minutes   27  -NW  --        Total Minutes    Timed Charges Total Minutes  27  -NW  29  -CJ      Total Minutes  27  -NW  29  -CJ       User Key  (r) = Recorded By, (t) = Taken By, (c) = Cosigned By    Initials Name Provider Type    Mahendra Prescott COTA/L Occupational Therapy Assistant     Angle Nagel, PTA Physical Therapy Assistant        Therapy Charges for Today     Code Description Service Date Service Provider Modifiers Qty    35684523615 HC OT THER PROC EA 15 MIN 7/10/2021 Mahendra Elizalde COTA/L GO 2    91352562849 HC OT THER PROC EA 15 MIN 7/11/2021 Mahendra Elizalde COTA/L GO 2               GALLITO Garcia/L  7/11/2021

## 2021-07-11 NOTE — CONSULTS
Patient or patient's representative gives informed consent after an explanation of the risks (air embolism; catheter occlusion; phlebitis; catheter infection; bloodstream infection; vein thrombosis; hematoma/bleeding at the insertion site; slight discomfort; accidental puncture of an artery, nerve, or tendon; dislodging of device), benefits (longer dwell time, outpatient treatment, medications that cannot run peripherally), and alternatives (short peripheral catheter, centrally inserted central line, or permanent catheter) of PICC placement. Time provided to ask and answer questions.    Pt had 4 [place] single lumen PICC placed in right basilic vein. Pt tolerated procedure well. 39 cm of catheter internal and 2 cm external. Tip confirmation by 3cg. All lumens flush and draw well. Sterile dressing applied.

## 2021-07-11 NOTE — PROGRESS NOTES
"INFECTIOUS DISEASES PROGRESS NOTE    Patient:  Tawny Shin  YOB: 1955  MRN: [id number]   Admit date: 7/6/2021   Admitting Physician: Jun Hawley*  Primary Care Physician: Davon Urrutia MD      Chief Complaint: \" do you know when I will be dismissed?\"        Interval History: Patient notes she has no new complaints.    Allergies:   Allergies   Allergen Reactions   • Atorvastatin Other (See Comments)     LEG CRAMPS     • Amoxicillin Rash   • Escitalopram Rash   • Nabumetone Rash   • Niacin Er Rash   • Penicillins Rash   • Simvastatin Rash       Current Meds:     Current Facility-Administered Medications:   •  acetaminophen (TYLENOL) tablet 650 mg, 650 mg, Oral, Q4H PRN **OR** acetaminophen (TYLENOL) suppository 650 mg, 650 mg, Rectal, Q4H PRN, James Huntley MD  •  apixaban (ELIQUIS) tablet 5 mg, 5 mg, Oral, Q12H, James Huntley MD, 5 mg at 07/11/21 0848  •  carvedilol (COREG) tablet 25 mg, 25 mg, Oral, BID With Meals, James Huntley MD, 25 mg at 07/11/21 0849  •  docusate sodium (COLACE) capsule 100 mg, 100 mg, Oral, BID, Jun Hawley MD, 100 mg at 07/11/21 0848  •  fluticasone (FLONASE) 50 MCG/ACT nasal spray 1 spray, 1 spray, Nasal, Daily, James Huntley MD, 1 spray at 07/11/21 0853  •  furosemide (LASIX) tablet 40 mg, 40 mg, Oral, BID, Jun Hawley MD, 40 mg at 07/11/21 0848  •  gabapentin (NEURONTIN) capsule 300 mg, 300 mg, Oral, Nightly, James Huntley MD, 300 mg at 07/10/21 2119  •  HYDROcodone-acetaminophen (NORCO) 7.5-325 MG per tablet 1 tablet, 1 tablet, Oral, Q4H PRN, James Huntley MD, 1 tablet at 07/09/21 2207  •  HYDROcodone-acetaminophen (NORCO) 7.5-325 MG per tablet 2 tablet, 2 tablet, Oral, Q4H PRN, James Huntley MD, 2 tablet at 07/09/21 1449  •  HYDROmorphone (DILAUDID) injection 0.5 mg, 0.5 mg, Intravenous, Q2H PRN, 0.5 mg at 07/09/21 0837 **AND** naloxone (NARCAN) injection 0.1 mg, " 0.1 mg, Intravenous, Q5 Min PRN, James Huntley MD  •  isosorbide mononitrate (IMDUR) 24 hr tablet 60 mg, 60 mg, Oral, BID, James Huntley MD, 60 mg at 07/11/21 0850  •  levothyroxine (SYNTHROID, LEVOTHROID) tablet 75 mcg, 75 mcg, Oral, Q AM, James Huntley MD, 75 mcg at 07/11/21 0535  •  lidocaine (LIDODERM) 5 % 1 patch, 1 patch, Transdermal, Nightly, James Huntley MD, 1 patch at 07/11/21 0856  •  magnesium hydroxide (MILK OF MAGNESIA) suspension 2400 mg/10mL 10 mL, 10 mL, Oral, Daily PRN, James Huntley MD  •  magnesium oxide (MAG-OX) tablet 400 mg, 400 mg, Oral, Daily, James Huntley MD, 400 mg at 07/11/21 0848  •  meropenem (MERREM) 1 g/100 mL 0.9% NS VTB (mbp), 1 g, Intravenous, Q8H, Elie Copeland MD, 1 g at 07/11/21 0535  •  morphine injection 4 mg, 4 mg, Intravenous, Q2H PRN **AND** naloxone (NARCAN) injection 0.4 mg, 0.4 mg, Intravenous, Q5 Min PRN, James Huntley MD  •  multivitamin with minerals 1 tablet, 1 tablet, Oral, Daily, James Huntley MD, 1 tablet at 07/11/21 0847  •  nitroglycerin (NITROSTAT) SL tablet 0.4 mg, 0.4 mg, Sublingual, Q5 Min PRN, James Huntley MD  •  ondansetron (ZOFRAN) tablet 4 mg, 4 mg, Oral, Q6H PRN **OR** ondansetron (ZOFRAN) injection 4 mg, 4 mg, Intravenous, Q6H PRN, James Huntley MD  •  pantoprazole (PROTONIX) EC tablet 40 mg, 40 mg, Oral, Q AM, Jun Hawley MD, 40 mg at 07/11/21 0535  •  phenol (CHLORASEPTIC) 1.4 % liquid 2 spray, 2 spray, Mouth/Throat, Q2H PRN, James Huntley MD  •  polyethylene glycol (MIRALAX) packet 17 g, 17 g, Oral, Daily, Jun Hawley MD, 17 g at 07/11/21 0850  •  potassium chloride (MICRO-K) CR capsule 20 mEq, 20 mEq, Oral, Daily, James Huntley MD, 20 mEq at 07/11/21 0847  •  promethazine (PHENERGAN) tablet 12.5 mg, 12.5 mg, Oral, Q6H PRN **OR** promethazine (PHENERGAN) suppository 12.5 mg, 12.5 mg, Rectal, Q6H PRN, Audi,  "James Day MD  •  saccharomyces boulardii (FLORASTOR) capsule 250 mg, 250 mg, Oral, Daily, James Huntley MD, 250 mg at 21 0847  •  sodium chloride 0.9 % flush 1-10 mL, 1-10 mL, Intravenous, PRN, James Huntley MD  •  sodium chloride 0.9 % flush 10 mL, 10 mL, Intravenous, Q12H, James Huntley MD, 10 mL at 21 0853  •  sodium chloride 0.9 % flush 10 mL, 10 mL, Intravenous, PRN, James Huntley MD  •  sodium chloride 0.9 % flush 3 mL, 3 mL, Intravenous, Q12H, James Huntley MD, 3 mL at 21 0853  •  sucralfate (CARAFATE) tablet 1 g, 1 g, Oral, 4x Daily AC & at Bedtime, James Huntley MD, 1 g at 21 0848  •  traMADol (ULTRAM) tablet 50 mg, 50 mg, Oral, TID, James Huntley MD, 50 mg at 21 0850  •  valACYclovir (VALTREX) tablet 500 mg, 500 mg, Oral, Q24H, James Huntley MD, 500 mg at 21 0850      Review of Systems   Constitutional: Negative for fever.   Skin: Negative for rash.       Objective     Vital Signs:  Temp (24hrs), Av.1 °F (36.7 °C), Min:97.7 °F (36.5 °C), Max:98.6 °F (37 °C)      /54 (BP Location: Left arm, Patient Position: Lying)   Pulse 72   Temp 98 °F (36.7 °C) (Oral)   Resp 16   Ht 167.6 cm (65.98\")   Wt 89.9 kg (198 lb 1.6 oz)   SpO2 95%   BMI 31.99 kg/m²         Physical Exam   General: The patient is nontoxic-appearing sitting in recliner in no acute distress  HEENT: Sclera anicteric and noninjected  Respiratory: Effort even and unlabored.  She is not conversationally dyspneic  Hip: Unable to adequately evaluate wound givens patient sitting in chair.  Neuro: Alert and oriented, speech clear  Psych: Pleasant cooperative    Line/IV site: Peripheral IV forearm left, condition patent and no redness    Results Review:    I reviewed the patient's new clinical results.    Lab Results:  CBC:   Lab Results   Lab 21  1946 21  0550 21  0516 21  0525   WBC 5.16 4.23  --   --  "   HEMOGLOBIN 9.7* 8.5* 8.0* 8.3*   HEMATOCRIT 31.5* 27.0* 25.5* 26.3*   PLATELETS 297 238  --   --          CMP:   Lab Results   Lab 07/07/21  0550 07/09/21  0516 07/11/21  0525   SODIUM 141 142 142   POTASSIUM 3.6 3.5 3.7   CHLORIDE 103 104 105   CO2 31.0* 32.0* 29.0   BUN 10 12 13   CREATININE 0.77 0.75 0.77   CALCIUM 8.6 8.9 8.6   BILIRUBIN 0.2  --   --    ALK PHOS 99  --   --    ALT (SGPT) 6  --   --    AST (SGOT) 16  --   --    GLUCOSE 88 92 95         Culture Results:    Blood Culture   Date Value Ref Range Status   07/06/2021 No growth at 4 days  Preliminary   07/06/2021 No growth at 4 days  Preliminary     Urine Culture   Date Value Ref Range Status   07/06/2021 >100,000 CFU/mL Mixed Selina Isolated  Final       Microbiology Results Abnormal     Procedure Component Value - Date/Time    Tissue / Bone Culture - Bone, Hip, Right [[id number]] Collected: 07/08/21 1859    Lab Status: Final result Specimen: Bone from Hip, Right Updated: 07/11/21 0936     Tissue Culture No growth at 3 days     Gram Stain Few (2+) WBCs seen      No organisms seen    Anaerobic Culture - Bone, Hip, Right [[id number]] Collected: 07/08/21 1859    Lab Status: Preliminary result Specimen: Bone from Hip, Right Updated: 07/11/21 0934     Anaerobic Culture No anaerobes isolated at 3 days    Tissue / Bone Culture - Tissue, Hip, Right [[id number]]  (Abnormal)  (Susceptibility) Collected: 07/08/21 1858    Lab Status: Final result Specimen: Tissue from Hip, Right Updated: 07/11/21 0847     Tissue Culture Scant growth (1+) Pseudomonas aeruginosa     Gram Stain Moderate (3+) WBCs seen      Few (2+) Gram negative bacilli    Susceptibility      Pseudomonas aeruginosa      LICHA      Cefepime Susceptible      Ceftazidime Susceptible      Ciprofloxacin Susceptible      Gentamicin Susceptible      Levofloxacin Susceptible      Piperacillin + Tazobactam Susceptible               Linear View                   Blood Culture - Blood, Arm, Right [[id number]]  Collected: 07/06/21 2052    Lab Status: Preliminary result Specimen: Blood from Arm, Right Updated: 07/10/21 2130     Blood Culture No growth at 4 days    Blood Culture - Blood, Arm, Right [[id number]] Collected: 07/06/21 1946    Lab Status: Preliminary result Specimen: Blood from Arm, Right Updated: 07/10/21 2000     Blood Culture No growth at 4 days    Urine Culture - Urine, Urine, Clean Catch [[id number]] Collected: 07/06/21 2040    Lab Status: Final result Specimen: Urine, Clean Catch Updated: 07/08/21 1109     Urine Culture >100,000 CFU/mL Mixed Brett Isolated    Narrative:      Specimen contains mixed organisms of questionable pathogenicity which indicates contamination with commensal brett.  Further identification is unlikely to provide clinically useful information.  Suggest recollection.    COVID PRE-OP / PRE-PROCEDURE SCREENING ORDER (NO ISOLATION) - Swab, Nasal Cavity [[id number]]  (Normal) Collected: 07/06/21 2052    Lab Status: Final result Specimen: Swab from Nasal Cavity Updated: 07/06/21 2140    Narrative:      The following orders were created for panel order COVID PRE-OP / PRE-PROCEDURE SCREENING ORDER (NO ISOLATION) - Swab, Nasal Cavity.  Procedure                               Abnormality         Status                     ---------                               -----------         ------                     COVID-19,[facility]...[[id number]]  Normal              Final result                 Please view results for these tests on the individual orders.    COVID-19,[facility] IN-HOUSE,Nasal Swab No Transport Media 3-4 HR TAT - Swab, Nasal Cavity [[id number]]  (Normal) Collected: 07/06/21 2052    Lab Status: Final result Specimen: Swab from Nasal Cavity Updated: 07/06/21 2140     COVID19 Not Detected    Narrative:      Fact sheet for providers: https://www.fda.gov/media/682490/download     Fact sheet for patients: https://www.fda.gov/media/877024/download    Test performed by PCR.    Consider  negative results in combination with clinical observations, patient history, and epidemiological information.                Radiology:   Imaging Results (Last 72 Hours)     ** No results found for the last 72 hours. **          Assessment/Plan     Active Hospital Problems    Diagnosis    • **Osteomyelitis of right femur (CMS/HCC)    • Transient alteration of awareness    • Non-healing surgical wound, right hip    • Chronic deep vein thrombosis (DVT) of right lower extremity (CMS/HCC)    • Chronic pain syndrome        IMPRESSION:  1. Chronic right hip wound with possible osteomyelitis  2. Pseudomonas aeruginosa on culture with gram-negative bacilli on Gram stain.      RECOMMENDATION:   ·  DC meropenem  · Start cefepime -tolerated cephalosporins in past  · Recommendations/plan as per Dr. Ohara note July 10        Maggie Bang MD  07/11/21  11:06 CDT

## 2021-07-11 NOTE — PLAN OF CARE
Goal Outcome Evaluation:  Plan of Care Reviewed With: patient        Progress: improving  Outcome Summary: A & O, up with SBA and wx to BR, voiding, wet to dry dressing to R hip changed, small amount  serosanguinous drainage present, patient with good bed mobility, assisted with turns, c/o only mild pain, scheduled tramadol given

## 2021-07-11 NOTE — PLAN OF CARE
Problem: Adult Inpatient Plan of Care  Goal: Plan of Care Review  Outcome: Ongoing, Progressing  Flowsheets (Taken 7/11/2021 6547)  Progress: improving  Plan of Care Reviewed With: patient  Outcome Summary: Pt. first stated she wanted to shower, then decided not to and performed ue exs! This rodriguez/l provided pt. washcloths towel and pan of water!   Goal Outcome Evaluation:  Plan of Care Reviewed With: patient        Progress: improving  Outcome Summary: Pt. first stated she wanted to shower, then decided not to and performed ue exs! This rodriguez/l provided pt. washcloths towel and pan of water!

## 2021-07-11 NOTE — THERAPY TREATMENT NOTE
Acute Care - Physical Therapy Treatment Note  [facility]     Patient Name: Tawny Shin  : 1955  MRN: [id number]  Today's Date: 2021   Onset of Illness/Injury or Date of Surgery: 21       PT Assessment (last 12 hours)      PT Evaluation and Treatment     Row Name 21 1033          Physical Therapy Time and Intention    Subjective Information  complains of;pain  -NW     Document Type  therapy note (daily note)  -NW     Mode of Treatment  physical therapy  -NW     Patient Effort  good  -     Row Name 21 1033          General Information    Existing Precautions/Restrictions  fall  -     Row Name 21 1033          Pain Scale: Numbers Pre/Post-Treatment    Pretreatment Pain Rating  2/10  -NW     Posttreatment Pain Rating  4/10  -NW     Pain Location - Side  Right  -NW     Pain Location  hip  -[facility] Name 21 1033          Bed Mobility    Comment (Bed Mobility)  chair  -[facility] Name 21 1033          Transfers    Sit-Stand [place] (Transfers)  verbal cues;contact guard  -NW     Stand-Sit [place] (Transfers)  verbal cues;contact guard  -NW     [place] Level (Toilet Transfer)  verbal cues;contact guard  -NW     [facility] Name 21 1033          Sit-Stand Transfer    Assistive Device (Sit-Stand Transfers)  walker, front-wheeled  -NW     Row Name 21 1033          Stand-Sit Transfer    Assistive Device (Stand-Sit Transfers)  walker, front-wheeled  -[facility] Name 21 1033          Gait/Stairs (Locomotion)    [place] Level (Gait)  verbal cues;contact guard  -NW     Assistive Device (Gait)  walker, front-wheeled  -NW     Distance in Feet (Gait)  100x2  -NW     Pattern (Gait)  step-to;step-through  -NW     Deviations/Abnormal Patterns (Gait)  antalgic;rush decreased;stride length decreased  -NW     Bilateral Gait Deviations  forward flexed posture  -NW     Comment (Gait/Stairs)  cues for safety  -     Row Name 21 1033           Safety Issues, Functional Mobility    Impairments Affecting Function (Mobility)  balance;cognition  -NW     Row Name             Wound 02/09/21 1715 Right hip Incision    Wound - Properties Group Placement Date: 02/09/21  -SH Placement Time: 1715  -SH Present on Hospital Admission: Y  -SH Side: Right  -SH Location: hip  -SH Primary Wound Type: Incision  -SH    Retired Wound - Properties Group Date first assessed: 02/09/21  -SH Time first assessed: 1715  -SH Present on Hospital Admission: Y  -SH Side: Right  -SH Location: hip  -SH Primary Wound Type: Incision  -SH    Row Name 07/11/21 1033          Positioning and Restraints    Pre-Treatment Position  sitting in chair/recliner  -NW     Post Treatment Position  chair  -NW     In Chair  reclined;call light within reach;encouraged to call for assist;exit alarm on  -NW       User Key  (r) = Recorded By, (t) = Taken By, (c) = Cosigned By    Initials Name Provider Type    NW Angle Nagel PTA Physical Therapy Assistant     Laurie Herrera, RN Registered Nurse        Physical Therapy Education                 Title: PT OT SLP Therapies (In Progress)     Topic: Physical Therapy (In Progress)     Point: Mobility training (Done)     Learning Progress Summary           Patient Acceptance, E, VU by KINGS at 7/8/2021 0831    Comment: progression with amb    Acceptance, E, VU by MS at 7/7/2021 1511    Comment: role of PT in her care                   Point: Home exercise program (Not Started)     Learner Progress:  Not documented in this visit.          Point: Body mechanics (Not Started)     Learner Progress:  Not documented in this visit.          Point: Precautions (Not Started)     Learner Progress:  Not documented in this visit.                      User Key     Initials Effective Dates Name Provider Type Discipline    MS 06/19/18 -  Nicole Olson, PT, DPT, NCS Physical Therapist PT    KINGS 12/08/16 -  Garcia Francis PTA Physical Therapy Assistant PT              PT  Recommendation and Plan     Plan of Care Reviewed With: patient  Progress: no change  Outcome Summary: Pt up in chair. sit-stand cga x1 Pt able to amb 100'x2 rwx anatalgic fwd flex gait requiring standing rest and cues for safety. BLE swelling reinforced BLE elevation. Pt will need cont PT upon d/c for strength, mobility and safety  Outcome Measures     Row Name 07/10/21 0756             How much help from another is currently needed...    Putting on and taking off regular lower body clothing?  3  -CJ      Bathing (including washing, rinsing, and drying)  2  -CJ      Toileting (which includes using toilet bed pan or urinal)  3  -CJ      Putting on and taking off regular upper body clothing  4  -CJ      Taking care of personal grooming (such as brushing teeth)  4  -CJ      Eating meals  4  -CJ      AM-PAC 6 Clicks Score (OT)  20  -         Functional Assessment    Outcome Measure Options  AM-PAC 6 Clicks Daily Activity (OT)  -        User Key  (r) = Recorded By, (t) = Taken By, (c) = Cosigned By    Initials Name Provider Type    CJ Mahendra Elizalde COTA/L Occupational Therapy Assistant           Time Calculation:   PT Charges     Row Name 07/11/21 1100             Time Calculation    Start Time  1033  -NW      Stop Time  1100  -NW      Time Calculation (min)  27 min  -NW      PT Received On  07/11/21  -NW      PT Goal Re-Cert Due Date  07/17/21  -NW         Time Calculation- PT    Total Timed Code Minutes- PT  27 minute(s)  -NW         Timed Charges    25787 - Gait Training Minutes   27  -NW         Total Minutes    Timed Charges Total Minutes  27  -NW       Total Minutes  27  -NW        User Key  (r) = Recorded By, (t) = Taken By, (c) = Cosigned By    Initials Name Provider Type    NW Angle Nagel PTA Physical Therapy Assistant        Therapy Charges for Today     Code Description Service Date Service Provider Modifiers Qty    36140393770 HC GAIT TRAINING EA 15 MIN 7/10/2021 Angle Nagel PTA GP 2     [id number]  GAIT TRAINING EA 15 MIN 7/11/2021 Angle Nagel, PTA GP 2          PT G-Codes  Outcome Measure Options: AM-PAC 6 Clicks Daily Activity (OT)  AM-PAC 6 Clicks Score (PT): 16  AM-PAC 6 Clicks Score (OT): 20    Angle Nagel PTA  7/11/2021

## 2021-07-11 NOTE — PROGRESS NOTES
[facility] Medicine Services  INPATIENT PROGRESS NOTE    Patient Name: Tawny Shin  Date of Admission: 7/6/2021  Today's Date: 07/11/21  Length of Stay: 5  Primary Care Physician: Davon Urrutia MD    Subjective   Chief Complaint: f/u   HPI   + Bm yesterday. Only had to take about 1/4 of mag citrate  Feels more swollen. IVF been d/c at least since yesterday. Lasix increased July 10 to BID. Renal fxn and electrolytes are ok. Still swollen  No bleeding. hgb improve. Patient on Eliquis for dvt LE        Review of Systems     All pertinent negatives and positives are as above. All other systems have been reviewed and are negative unless otherwise stated.     Objective    Temp:  [97.7 °F (36.5 °C)-98.6 °F (37 °C)] 98 °F (36.7 °C)  Heart Rate:  [66-77] 72  Resp:  [16-18] 16  BP: (105-139)/(47-56) 139/54  Physical Exam  Seated comfortably on recliner.  No distress  She is neurologically intact.   She is interactive and appropriate in affect  Anicteric sclera  Supple neck  No thyromegaly  Moist oral mucosa  Lungs are clear to auscultation with good air exchange  S1-S2, regular rate and rhythm  Soft abdomen, nontender, no organomegaly  No cyanosis  Positive pitting edema  No significant change from yesterday.   Hemodynamically stable    Results Review:  I have reviewed the labs, radiology results, and diagnostic studies.    Laboratory Data:   Results from last 7 days   Lab Units 07/11/21  0525 07/09/21  0516 07/07/21  0550 07/06/21  1946   WBC 10*3/mm3  --   --  4.23 5.16   HEMOGLOBIN g/dL 8.3* 8.0* 8.5* 9.7*   HEMATOCRIT % 26.3* 25.5* 27.0* 31.5*   PLATELETS 10*3/mm3  --   --  238 297        Results from last 7 days   Lab Units 07/11/21  0525 07/09/21  0516 07/07/21  0550   SODIUM mmol/L 142 142 141   POTASSIUM mmol/L 3.7 3.5 3.6   CHLORIDE mmol/L 105 104 103   CO2 mmol/L 29.0 32.0* 31.0*   BUN mg/dL 13 12 10   CREATININE mg/dL 0.77 0.75 0.77   CALCIUM mg/dL 8.6 8.9 8.6   BILIRUBIN  mg/dL  --   --  0.2   ALK PHOS U/L  --   --  99   ALT (SGPT) U/L  --   --  6   AST (SGOT) U/L  --   --  16   GLUCOSE mg/dL 95 92 88       Culture Data:   Blood Culture   Date Value Ref Range Status   07/06/2021 No growth at 4 days  Preliminary   07/06/2021 No growth at 4 days  Preliminary     Urine Culture   Date Value Ref Range Status   07/06/2021 >100,000 CFU/mL Mixed Selina Isolated  Final       Radiology Data:   Imaging Results (Last 24 Hours)     ** No results found for the last 24 hours. **          I have reviewed the patient's current medications.     Assessment/Plan     Active Hospital Problems    Diagnosis    • **Osteomyelitis of right femur (CMS/HCC)    • Transient alteration of awareness    • Non-healing surgical wound, right hip    • Chronic deep vein thrombosis (DVT) of right lower extremity (CMS/HCC)    • Chronic pain syndrome          Problem list  Chronic wound right hip with concern for chronic/(?)Treated osteomyelitis-normally has a wound VAC, home health following.  Recent history of DVT (June 2021) with prior history of it (confirmed with son it was prior superficial vein thrombosis).    Fall, recurrent -because of accidental injury at home  Chronic pain syndrome  Bilateral lower extremity edema  Hypertension - controlled; cont current regimen  Constipation - had BM yesterday    Plans:    PICC line consult for 4-6 wks abx (currently on merrem pending susceptibility) for pseudomonas infection possible osteomyelitis/chronic right hip wound. ID following    vanc d/c yesterday   Cont supportive care  Discussed with nurse.     apixaban, 5 mg, Oral, Q12H  carvedilol, 25 mg, Oral, BID With Meals  cefepime, 2 g, Intravenous, Once  cefepime, 2 g, Intravenous, Q8H  docusate sodium, 100 mg, Oral, BID  fluticasone, 1 spray, Nasal, Daily  furosemide, 40 mg, Oral, BID  gabapentin, 300 mg, Oral, Nightly  isosorbide mononitrate, 60 mg, Oral, BID  levothyroxine, 75 mcg, Oral, Q AM  lidocaine, 1 patch,  Transdermal, Nightly  magnesium oxide, 400 mg, Oral, Daily  multivitamin with minerals, 1 tablet, Oral, Daily  pantoprazole, 40 mg, Oral, Q AM  polyethylene glycol, 17 g, Oral, Daily  potassium chloride, 20 mEq, Oral, Daily  saccharomyces boulardii, 250 mg, Oral, Daily  sodium chloride, 10 mL, Intravenous, Q12H  sodium chloride, 3 mL, Intravenous, Q12H  sucralfate, 1 g, Oral, 4x Daily AC & at Bedtime  traMADol, 50 mg, Oral, TID  valACYclovir, 500 mg, Oral, Q24H            Discharge Planning:to snf early this week pending susceptibility and final recommendation from ID on abx    Electronically signed by Jun Hawley MD, 07/11/21, 11:04 CDT.

## 2021-07-11 NOTE — PLAN OF CARE
Goal Outcome Evaluation:  Plan of Care Reviewed With: patient        Progress: no change  Outcome Summary: Pt A&Ox4. standby asst to BR, walker. PPP. c/o little to no pain, scheduled ultram given as ordered. N/t below knees. RAMIREZ. Eliquis VTE. R hip drsg, CDI, no drainage noted. Tele - normal sinus. Call light within reach. Safety maintained.

## 2021-07-11 NOTE — PLAN OF CARE
Goal Outcome Evaluation:  Plan of Care Reviewed With: patient           Outcome Summary: Patient A+Ox4, RAMIREZ, tolerating iv antibiotics, mod BM today after some mag citrate. Eating well and tolerating dressing changes to right hip. Up to chair most of the day and tolerating scheduled Ultram.  VSS

## 2021-07-12 LAB
ANION GAP SERPL CALCULATED.3IONS-SCNC: 8 MMOL/L (ref 5–15)
BUN SERPL-MCNC: 12 MG/DL (ref 8–23)
BUN/CREAT SERPL: 16.4 (ref 7–25)
CALCIUM SPEC-SCNC: 9 MG/DL (ref 8.6–10.5)
CHLORIDE SERPL-SCNC: 100 MMOL/L (ref 98–107)
CO2 SERPL-SCNC: 33 MMOL/L (ref 22–29)
CREAT SERPL-MCNC: 0.73 MG/DL (ref 0.57–1)
GFR SERPL CREATININE-BSD FRML MDRD: 80 ML/MIN/1.73
GLUCOSE SERPL-MCNC: 87 MG/DL (ref 65–99)
POTASSIUM SERPL-SCNC: 3.5 MMOL/L (ref 3.5–5.2)
SODIUM SERPL-SCNC: 141 MMOL/L (ref 136–145)

## 2021-07-12 PROCEDURE — 97116 GAIT TRAINING THERAPY: CPT

## 2021-07-12 PROCEDURE — 97535 SELF CARE MNGMENT TRAINING: CPT

## 2021-07-12 PROCEDURE — 99221 1ST HOSP IP/OBS SF/LOW 40: CPT | Performed by: NURSE PRACTITIONER

## 2021-07-12 PROCEDURE — 25010000002 CEFEPIME PER 500 MG: Performed by: INTERNAL MEDICINE

## 2021-07-12 PROCEDURE — 80048 BASIC METABOLIC PNL TOTAL CA: CPT | Performed by: INTERNAL MEDICINE

## 2021-07-12 RX ADMIN — POLYETHYLENE GLYCOL (3350) 17 G: 17 POWDER, FOR SOLUTION ORAL at 08:59

## 2021-07-12 RX ADMIN — FLUTICASONE PROPIONATE 1 SPRAY: 50 SPRAY, METERED NASAL at 09:03

## 2021-07-12 RX ADMIN — SODIUM CHLORIDE, PRESERVATIVE FREE 3 ML: 5 INJECTION INTRAVENOUS at 21:08

## 2021-07-12 RX ADMIN — PANTOPRAZOLE SODIUM 40 MG: 40 TABLET, DELAYED RELEASE ORAL at 06:20

## 2021-07-12 RX ADMIN — APIXABAN 5 MG: 5 TABLET, FILM COATED ORAL at 21:06

## 2021-07-12 RX ADMIN — CARVEDILOL 25 MG: 25 TABLET, FILM COATED ORAL at 08:59

## 2021-07-12 RX ADMIN — CEFEPIME HYDROCHLORIDE 2 G: 2 INJECTION, POWDER, FOR SOLUTION INTRAVENOUS at 12:42

## 2021-07-12 RX ADMIN — CEFEPIME HYDROCHLORIDE 2 G: 2 INJECTION, POWDER, FOR SOLUTION INTRAVENOUS at 05:21

## 2021-07-12 RX ADMIN — SUCRALFATE 1 G: 1 TABLET ORAL at 08:59

## 2021-07-12 RX ADMIN — DOCUSATE SODIUM 100 MG: 100 CAPSULE ORAL at 08:58

## 2021-07-12 RX ADMIN — CARVEDILOL 25 MG: 25 TABLET, FILM COATED ORAL at 17:17

## 2021-07-12 RX ADMIN — CEFEPIME HYDROCHLORIDE 2 G: 2 INJECTION, POWDER, FOR SOLUTION INTRAVENOUS at 21:06

## 2021-07-12 RX ADMIN — FUROSEMIDE 40 MG: 40 TABLET ORAL at 08:58

## 2021-07-12 RX ADMIN — LEVOTHYROXINE SODIUM 75 MCG: 75 TABLET ORAL at 06:20

## 2021-07-12 RX ADMIN — VALACYCLOVIR 500 MG: 500 TABLET, FILM COATED ORAL at 08:58

## 2021-07-12 RX ADMIN — SUCRALFATE 1 G: 1 TABLET ORAL at 21:06

## 2021-07-12 RX ADMIN — TRAMADOL HYDROCHLORIDE 50 MG: 50 TABLET, FILM COATED ORAL at 08:59

## 2021-07-12 RX ADMIN — SUCRALFATE 1 G: 1 TABLET ORAL at 17:17

## 2021-07-12 RX ADMIN — SODIUM CHLORIDE, PRESERVATIVE FREE 10 ML: 5 INJECTION INTRAVENOUS at 21:08

## 2021-07-12 RX ADMIN — SODIUM CHLORIDE, PRESERVATIVE FREE 3 ML: 5 INJECTION INTRAVENOUS at 09:03

## 2021-07-12 RX ADMIN — ISOSORBIDE MONONITRATE 60 MG: 60 TABLET, EXTENDED RELEASE ORAL at 08:59

## 2021-07-12 RX ADMIN — Medication 1 TABLET: at 08:59

## 2021-07-12 RX ADMIN — FUROSEMIDE 40 MG: 40 TABLET ORAL at 17:17

## 2021-07-12 RX ADMIN — APIXABAN 5 MG: 5 TABLET, FILM COATED ORAL at 08:58

## 2021-07-12 RX ADMIN — TRAMADOL HYDROCHLORIDE 50 MG: 50 TABLET, FILM COATED ORAL at 21:07

## 2021-07-12 RX ADMIN — SUCRALFATE 1 G: 1 TABLET ORAL at 13:01

## 2021-07-12 RX ADMIN — POTASSIUM CHLORIDE 20 MEQ: 750 CAPSULE, EXTENDED RELEASE ORAL at 08:59

## 2021-07-12 RX ADMIN — Medication 400 MG: at 08:59

## 2021-07-12 RX ADMIN — TRAMADOL HYDROCHLORIDE 50 MG: 50 TABLET, FILM COATED ORAL at 17:17

## 2021-07-12 RX ADMIN — LIDOCAINE 1 PATCH: 50 PATCH CUTANEOUS at 08:59

## 2021-07-12 RX ADMIN — ISOSORBIDE MONONITRATE 60 MG: 60 TABLET, EXTENDED RELEASE ORAL at 21:06

## 2021-07-12 RX ADMIN — Medication 250 MG: at 08:58

## 2021-07-12 RX ADMIN — GABAPENTIN 300 MG: 300 CAPSULE ORAL at 21:07

## 2021-07-12 RX ADMIN — SODIUM CHLORIDE, PRESERVATIVE FREE 10 ML: 5 INJECTION INTRAVENOUS at 09:03

## 2021-07-12 NOTE — PROGRESS NOTES
[facility]  INPATIENT PROGRESS NOTE    Patient Name: Tawny Shin  Date of Admission: 7/6/2021  Today's Date: 07/12/21  Length of Stay: 6  Primary Care Physician: Davon Urrutia MD    Subjective   Chief Complaint: f/u wound/osteo    HPI   Patient seen and examined.  Doing well.  No fevers or chills.  No chest pain or shortness of breath.  No nausea or vomiting.  Tolerating p.o.  + BM.        Review of Systems   All pertinent negatives and positives are as above. All other systems have been reviewed and are negative unless otherwise stated.     Objective    Temp:  [97.7 °F (36.5 °C)-98.4 °F (36.9 °C)] 98.2 °F (36.8 °C)  Heart Rate:  [67-91] 73  Resp:  [16-18] 16  BP: (101-135)/(49-62) 101/50  Physical Exam  GEN: Awake, alert, interactive, in NAD  HEENT:  PERRLA, EOMI, Anicteric, Trachea midline  Lungs: no wheezing/rales/rhonchi  Heart: RRR, +S1/s2, no rub  ABD: soft, nt/nd, +BS, no guarding/rebound  Extremities: R hip dressed, trace LE edema  Skin: no rashes or lesions  Neuro: AAOx3, no focal deficits  Psych: normal mood & affect        Results Review:  I have reviewed the labs, radiology results, and diagnostic studies.    Laboratory Data:   Results from last 7 days   Lab Units 07/11/21  0525 07/09/21  0516 07/07/21  0550 07/06/21  1946   WBC 10*3/mm3  --   --  4.23 5.16   HEMOGLOBIN g/dL 8.3* 8.0* 8.5* 9.7*   HEMATOCRIT % 26.3* 25.5* 27.0* 31.5*   PLATELETS 10*3/mm3  --   --  238 297        Results from last 7 days   Lab Units 07/12/21  0529 07/11/21  0525 07/09/21  0516 07/07/21  0550   SODIUM mmol/L 141 142 142 141   POTASSIUM mmol/L 3.5 3.7 3.5 3.6   CHLORIDE mmol/L 100 105 104 103   CO2 mmol/L 33.0* 29.0 32.0* 31.0*   BUN mg/dL 12 13 12 10   CREATININE mg/dL 0.73 0.77 0.75 0.77   CALCIUM mg/dL 9.0 8.6 8.9 8.6   BILIRUBIN mg/dL  --   --   --  0.2   ALK PHOS U/L  --   --   --  99   ALT (SGPT) U/L  --   --   --  6   AST (SGOT) U/L  --   --   --  16    GLUCOSE mg/dL 87 95 92 88       Culture Data:   Blood Culture   Date Value Ref Range Status   07/06/2021 No growth at 5 days  Final   07/06/2021 No growth at 5 days  Final     Urine Culture   Date Value Ref Range Status   07/06/2021 >100,000 CFU/mL Mixed Selina Isolated  Final       Radiology Data:   Imaging Results (Last 24 Hours)     ** No results found for the last 24 hours. **          I have reviewed the patient's current medications.     Assessment/Plan     Active Hospital Problems    Diagnosis    • **Osteomyelitis of right femur (CMS/HCC)    • Transient alteration of awareness    • Non-healing surgical wound, right hip    • Chronic deep vein thrombosis (DVT) of right lower extremity (CMS/HCC)    • Chronic pain syndrome        Antibiotics changed to cefepime yesterday based on culture sensitivity.  Now off Vanco.  Plan for 4 to 6 weeks of antibiotics per ID.  PICC line in place.    We will get plastic surgery consult today for evaluation recommendations on right hip wound and potential flap.    Continue with therapies.    For discharge to SNF likely as early as tomorrow.  May need wound VAC pending recommendations.  Had one prior.      Discharge Planning: I expect the patient to be discharged to nursing facility likely tomorrow    Electronically signed by Christos Min DO, 07/12/21, 14:58 CDT.

## 2021-07-12 NOTE — THERAPY TREATMENT NOTE
Acute Care - Physical Therapy Treatment Note  [facility]     Patient Name: Tawny Shin  : 1955  MRN: [id number]  Today's Date: 2021   Onset of Illness/Injury or Date of Surgery: 21       PT Assessment (last 12 hours)      PT Evaluation and Treatment     Row Name 21 1010          Physical Therapy Time and Intention    Subjective Information  complains of;pain  -NW     Document Type  therapy note (daily note)  -NW     Mode of Treatment  physical therapy  -NW     Patient Effort  good  -NW     Comment  R hip wound Nsg doing dressing changes  -NW     Row Name 21 1010          General Information    Existing Precautions/Restrictions  fall  -NW     Row Name 21 1010          Pain Scale: Numbers Pre/Post-Treatment    Pretreatment Pain Rating  2/10  -NW     Posttreatment Pain Rating  4/10  -NW     Pain Location - Side  Right  -NW     Pain Location  hip  -[facility] Name 21 1010          Bed Mobility    Supine-Sit [place] (Bed Mobility)  verbal cues;standby assist  -NW     Sit-Supine [place] (Bed Mobility)  supervision  -     Row Name 21 1010          Transfers    Sit-Stand [place] (Transfers)  verbal cues;contact guard  -NW     Stand-Sit [place] (Transfers)  verbal cues;contact guard  -NW     [place] Level (Toilet Transfer)  verbal cues;contact guard  -NW     Row Name 21 1010          Sit-Stand Transfer    Assistive Device (Sit-Stand Transfers)  walker, front-wheeled  -NW     [facility] Name 21 1010          Stand-Sit Transfer    Assistive Device (Stand-Sit Transfers)  walker, front-wheeled  -[facility] Name 21 1010          Gait/Stairs (Locomotion)    W[place] Level (Gait)  verbal cues;contact guard  -NW     Assistive Device (Gait)  walker, front-wheeled  -NW     Distance in Feet (Gait)  75x3  -NW     Pattern (Gait)  step-to;step-through  -NW     Deviations/Abnormal Patterns (Gait)  antalgic;rush decreased;stride length decreased   -NW     Bilateral Gait Deviations  forward flexed posture  -NW     Row Name 07/12/21 1010          Safety Issues, Functional Mobility    Impairments Affecting Function (Mobility)  balance;cognition  -NW     Row Name             Wound 02/09/21 1715 Right hip Incision    Wound - Properties Group Placement Date: 02/09/21  -SH Placement Time: 1715  -SH Present on Hospital Admission: Y  -SH Side: Right  -SH Location: hip  -SH Primary Wound Type: Incision  -SH    Retired Wound - Properties Group Date first assessed: 02/09/21  -SH Time first assessed: 1715  -SH Present on Hospital Admission: Y  -SH Side: Right  -SH Location: hip  -SH Primary Wound Type: Incision  -SH    Row Name 07/12/21 1010          Positioning and Restraints    Pre-Treatment Position  in bed  -NW     Post Treatment Position  bathroom  -NW     Bathroom  sitting;with OT  -NW       User Key  (r) = Recorded By, (t) = Taken By, (c) = Cosigned By    Initials Name Provider Type    NW Angle Nagel PTA Physical Therapy Assistant     Laurie Herrera, RN Registered Nurse        Physical Therapy Education                 Title: PT OT SLP Therapies (In Progress)     Topic: Physical Therapy (In Progress)     Point: Mobility training (Done)     Learning Progress Summary           Patient Acceptance, E VU by KINGS at 7/8/2021 0831    Comment: progression with amb    BEBO Hernandez VU by MS at 7/7/2021 1511    Comment: role of PT in her care                   Point: Home exercise program (Not Started)     Learner Progress:  Not documented in this visit.          Point: Body mechanics (Not Started)     Learner Progress:  Not documented in this visit.          Point: Precautions (Not Started)     Learner Progress:  Not documented in this visit.                      User Key     Initials Effective Dates Name Provider Type Discipline    MS 06/19/18 -  Nicole Olson, PT, DPT, NCS Physical Therapist PT     12/08/16 -  Garcia Francis PTA Physical Therapy  Assistant PT              PT Recommendation and Plan     Plan of Care Reviewed With: patient  Progress: no change  Outcome Summary: Pt up in chair. sit-stand cga x1 Pt able to amb 100'x2 rwx anatalgic fwd flex gait requiring standing rest and cues for safety. BLE swelling reinforced BLE elevation. Pt will need cont PT upon d/c for strength, mobility and safety  Outcome Measures     Row Name 07/11/21 0759 07/10/21 0756          How much help from another is currently needed...    Putting on and taking off regular lower body clothing?  3  -CJ  3  -CJ     Bathing (including washing, rinsing, and drying)  2  -CJ  2  -CJ     Toileting (which includes using toilet bed pan or urinal)  3  -CJ  3  -CJ     Putting on and taking off regular upper body clothing  4  -CJ  4  -CJ     Taking care of personal grooming (such as brushing teeth)  4  -CJ  4  -CJ     Eating meals  4  -CJ  4  -CJ     AM-PAC 6 Clicks Score (OT)  20  -CJ  20  -CJ        Functional Assessment    Outcome Measure Options  AM-PAC 6 Clicks Daily Activity (OT)  -CJ  AM-PAC 6 Clicks Daily Activity (OT)  -CJ       User Key  (r) = Recorded By, (t) = Taken By, (c) = Cosigned By    Initials Name Provider Type    Mahendra rPescott COTA/L Occupational Therapy Assistant           Time Calculation:   PT Charges     Row Name 07/12/21 1059             Time Calculation    Start Time  1010  -NW      Stop Time  1035  -NW      Time Calculation (min)  25 min  -NW      PT Received On  07/12/21  -NW      PT Goal Re-Cert Due Date  07/17/21  -NW         Time Calculation- PT    Total Timed Code Minutes- PT  25 minute(s)  -NW         Timed Charges    03043 - Gait Training Minutes   25  -NW         Total Minutes    Timed Charges Total Minutes  25  -NW       Total Minutes  25  -NW        User Key  (r) = Recorded By, (t) = Taken By, (c) = Cosigned By    Initials Name Provider Type    Angle Richter, SERENA Physical Therapy Assistant        Therapy Charges for Today     Code  Description Service Date Service Provider Modifiers Qty    83464302644 HC GAIT TRAINING EA 15 MIN 7/11/2021 Angle Nagel, SERENA GP 2    30041906252 HC GAIT TRAINING EA 15 MIN 7/12/2021 Angle Nagel PTA GP 2          PT G-Codes  Outcome Measure Options: AM-PAC 6 Clicks Daily Activity (OT)  AM-PAC 6 Clicks Score (PT): 16  AM-PAC 6 Clicks Score (OT): 20    Angle Nagel PTA  7/12/2021

## 2021-07-12 NOTE — PLAN OF CARE
Goal Outcome Evaluation:  Plan of Care Reviewed With: patient        Progress: no change  Outcome Summary: A & O x 4, up to BR with SBA and WX, minimal c/o pain, scheduled ultram given for chronic pain, wet to dry dressing to R hip changed, small amount serosanguinous drainage present, normal sinus on tele, bilateral lower extremity edema, now on lasix BID and improving, IV ABX continue, PICC to KANDICE, PICC dressing changed this am r/t small amount bloody drainage on biopatch

## 2021-07-12 NOTE — CONSULTS
[facility]  INPATIENT WOUND CONSULTATION    Today's Date: 07/12/21    Patient Name: Tawny Shin  MRN: [id number]  [facility]: [id number]  PCP: Davon Urrutia MD  Referring Provider: James Huntley MD  Attending Provider: Christos Min DO  Length of Stay: 6    SUBJECTIVE   Chief Complaint: Non-healing right hip incision    HPI: Tawny Shin, a 65 y.o.female, presents with a past medical history including heart disease, Sjogren's disease, myocardial infarction, and coronary artery disease.  Patient was admitted on 7/6/2021 after she suffered a fall and hit her head and experienced a 4-day history of worsening altered mental status.  During ER work-up x-ray of the pelvis revealed persistent overlying soft tissue defect concerning for chronic osteomyelitis.  Orthopedics was consulted.  She is well-known to orthopedics due to previous abductor repair surgery by Dr. Carlson.  Dr. Bashir took patient to surgery on 7/8/2021 to complete excisional debridement right hip including skin, subcutaneous tissue, muscle, and bone.  Inpatient wound care is consulted for dressing options for this area.       Visit Dx:    ICD-10-CM ICD-9-CM   1. Osteomyelitis of right femur, unspecified type (CMS/HCC)  M86.9 730.25   2. Altered mental status, unspecified altered mental status type  R41.82 780.97   3. Decreased activities of daily living (ADL)  Z78.9 V49.89   4. Decreased functional activity tolerance  R68.89 780.99   5. Osteomyelitis (CMS/HCC)  M86.9 730.20     Patient Active Problem List   Diagnosis   • Eustachian tube dysfunction   • Sensorineural hearing loss   • Spondylolisthesis of lumbar region   • Coronary artery disease   • Hyperlipidemia   • Hypertension   • Myalgia due to statin   • S/P coronary artery stent placement   • Pacemaker   • Seropositive rheumatoid arthritis of multiple sites (CMS/HCC)   • Sick sinus syndrome (CMS/HCC)   • Age-related osteoporosis with current pathological fracture   •  Allergic-infective asthma   • Arthritis of both knees   • Vasovagal syncope   • Bilateral bunions   • Bronchitis   • Cardiac pacemaker syndrome   • Charcot's joint of foot, left   • Constipation   • 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   • Intrinsic asthma   • Left carotid bruit   • Neutropenia (CMS/[facility])   • Primary osteoarthritis of left knee   • Psoriasis vulgaris   • Recurrent acute serous otitis media of both ears   • Thrombocytopenia (CMS/[facility])   • Hypothyroidism   • Vitamin D deficiency   • Myxedema heart disease   • Syncope, cardiogenic   • Rupture of gluteus minimus tendon   • Muscle tear   • Syncope, recurrent   • Leukocytosis   • Headache   • Elevated CPK   • Staphylococcus aureus bacteremia   • Right hip pain, suspected infection   • Obesity (BMI 30-39.9)   • Stenosis of cervical spine with myelopathy (CMS/[facility])   • Spinal stenosis, lumbar region, with neurogenic claudication   • Osteomyelitis of lumbar spine (CMS/[facility])   • Iliopsoas abscess (CMS/[facility])   • Closed wedge compression fracture of T12 vertebra (CMS/[facility])   • Concussion with no loss of consciousness   • Fall as cause of accidental injury at home as place of occurrence   • Chronic pain syndrome   • Hypokalemia   • Bilateral lower extremity edema   • Chronic deep vein thrombosis (DVT) of right lower extremity (CMS/[facility])   • Closed fracture of ankle   • Closed fracture of lateral malleolus   • Closed fracture of shaft of metatarsal bone   • Osteomyelitis of right femur (CMS/[facility])   • Transient alteration of awareness   • Non-healing surgical wound, right hip       History:   Past Medical History:   Diagnosis Date   • Arthritis    • Asthma    • Chronic sinusitis    • Coronary artery disease    • Eustachian tube dysfunction    • Heart disease    • Herpes simplex    • Hyperlipidemia    • Hypertension    • Knee dislocation    • Labral tear of right hip joint    • Laryngitis sicca    • Laryngitis, chronic    • MI (myocardial infarction) (CMS/[facility])    • Otorrhea     • Sensorineural hearing loss    • Sjogren's disease (CMS/HCC)      Past Surgical History:   Procedure Laterality Date   • A-V CARDIAC PACEMAKER INSERTION  2016   • ATRIAL CARDIAC PACEMAKER INSERTION     • CARDIAC CATHETERIZATION     • CATARACT EXTRACTION     • CERVICAL CORPECTOMY N/A 3/3/2021    Procedure: CERVICAL 6 CORPECTOMY WITH TITANIUM CAGE WITH NEURO MONITORING;  Surgeon: Bandar Shea MD;  Location:  [facility] OR;  Service: Neurosurgery;  Laterality: N/A;   • COLONOSCOPY  11/08/2011    One fold in the ascending colon which showed ulcer otherwise normal exam   • COLONOSCOPY  11/12/2004    Normal exam repeat in five years   • CORONARY ANGIOPLASTY WITH STENT PLACEMENT      X 2; 2013 & 2014   • ENDOSCOPY  07/10/2014    Normal exam   • HIP ABDUCTION TENOTOMY BILATERAL Right 1/14/2021    Procedure: RIGHT HIP GLUTEUS MEDLUS / MINIMUS REPAIR, POSSIBLE ACHILLES ALLOGRAFT;  Surgeon: Nino Carlson MD;  Location:  [facility];  Service: Orthopedics;  Laterality: Right;   • INCISION AND DRAINAGE HIP Right 2/9/2021    Procedure: HIP INCISION AND DRAINAGE;  Surgeon: Nino Carlson MD;  Location:  [facility];  Service: Orthopedics;  Laterality: Right;   • INCISION AND DRAINAGE OF WOUND Right 7/8/2021    Procedure: INCISION AND DRAINAGE WOUND RIGHT HIP;  Surgeon: James Huntley MD;  Location:  [facility];  Service: Orthopedics;  Laterality: Right;   • JOINT REPLACEMENT     • LUMBAR DISCECTOMY Right 3/23/2021    Procedure: LUMBAR DISCECTOMY MICRO, Lumbar 1/2 right;  Surgeon: Bandar Shea MD;  Location:  [facility];  Service: Neurosurgery;  Laterality: Right;   • LUMBAR FUSION N/A 1/19/2018    Procedure: L3-4,L4-5 DECOMPRESSION, POSTERIOR SPINAL FUSION WITH INSTRUMENTATION;  Surgeon: Fortino Oropeza MD;  Location:  [facility];  Service:    • LUMBAR LAMINECTOMY WITH FUSION Left 1/17/2018    Procedure: LEFT L3-4 L4-5 LATERAL LUMBAR INTERBODY FUSION;  Surgeon: Fortino Oropeza MD;  Location:  [facility];   Service:    • MYRINGOTOMY W/ TUBES  09/04/2014    TUBES NO LONGER IN PLACE   • OTHER SURGICAL HISTORY      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 so hardware was removed and spacers were placed   • REPLACEMENT TOTAL KNEE Right      Social History     Socioeconomic History   • Marital status:      Spouse name: Not on file   • Number of children: Not on file   • Years of education: Not on file   • Highest education level: Not on file   Tobacco Use   • Smoking status: Never Smoker   • Smokeless tobacco: Never Used   Substance and Sexual Activity   • Alcohol use: No   • Drug use: Never   • Sexual activity: Defer     Family History   Problem Relation Age of Onset   • Cancer Mother    • Heart disease Father        Allergies:  Allergies   Allergen Reactions   • Atorvastatin Other (See Comments)     LEG CRAMPS     • Amoxicillin Rash   • Escitalopram Rash   • Nabumetone Rash   • Niacin Er Rash   • Penicillins Rash   • Simvastatin Rash       Medications:    Current Facility-Administered Medications:   •  acetaminophen (TYLENOL) tablet 650 mg, 650 mg, Oral, Q4H PRN **OR** acetaminophen (TYLENOL) suppository 650 mg, 650 mg, Rectal, Q4H PRN, James Huntley MD  •  apixaban (ELIQUIS) tablet 5 mg, 5 mg, Oral, Q12H, James Huntley MD, 5 mg at 07/12/21 0858  •  carvedilol (COREG) tablet 25 mg, 25 mg, Oral, BID With Meals, James Huntley MD, 25 mg at 07/12/21 0859  •  cefepime (MAXIPIME) 2 g/100 mL 0.9% NS (mbp), 2 g, Intravenous, Q8H, Maggie Bang MD, 2 g at 07/12/21 0521  •  docusate sodium (COLACE) capsule 100 mg, 100 mg, Oral, BID, Jun Hawley MD, 100 mg at 07/12/21 0858  •  fluticasone (FLONASE) 50 MCG/ACT nasal spray 1 spray, 1 spray, Nasal, Daily, James Huntley MD, 1 spray at 07/12/21 0903  •  furosemide (LASIX) tablet 40 mg, 40 mg, Oral, BID, Jun Hawley MD, 40 mg at 07/12/21 0858  •  gabapentin (NEURONTIN) capsule 300 mg, 300 mg, Oral, Nightly, Audi,  James Day MD, 300 mg at 07/11/21 2102  •  HYDROcodone-acetaminophen (NORCO) 7.5-325 MG per tablet 1 tablet, 1 tablet, Oral, Q4H PRN, James Huntley MD, 1 tablet at 07/09/21 2207  •  HYDROcodone-acetaminophen (NORCO) 7.5-325 MG per tablet 2 tablet, 2 tablet, Oral, Q4H PRN, James Huntley MD, 2 tablet at 07/09/21 1449  •  HYDROmorphone (DILAUDID) injection 0.5 mg, 0.5 mg, Intravenous, Q2H PRN, 0.5 mg at 07/09/21 0837 **AND** naloxone (NARCAN) injection 0.1 mg, 0.1 mg, Intravenous, Q5 Min PRN, James Huntley MD  •  isosorbide mononitrate (IMDUR) 24 hr tablet 60 mg, 60 mg, Oral, BID, James Huntley MD, 60 mg at 07/12/21 0859  •  levothyroxine (SYNTHROID, LEVOTHROID) tablet 75 mcg, 75 mcg, Oral, Q AM, James Huntley MD, 75 mcg at 07/12/21 0620  •  lidocaine (LIDODERM) 5 % 1 patch, 1 patch, Transdermal, Nightly, James Huntley MD, 1 patch at 07/12/21 0859  •  magnesium hydroxide (MILK OF MAGNESIA) suspension 2400 mg/10mL 10 mL, 10 mL, Oral, Daily PRN, James Huntley MD  •  magnesium oxide (MAG-OX) tablet 400 mg, 400 mg, Oral, Daily, James Huntley MD, 400 mg at 07/12/21 0859  •  morphine injection 4 mg, 4 mg, Intravenous, Q2H PRN **AND** naloxone (NARCAN) injection 0.4 mg, 0.4 mg, Intravenous, Q5 Min PRN, James Huntley MD  •  multivitamin with minerals 1 tablet, 1 tablet, Oral, Daily, James Huntley MD, 1 tablet at 07/12/21 0859  •  nitroglycerin (NITROSTAT) SL tablet 0.4 mg, 0.4 mg, Sublingual, Q5 Min PRN, James Huntley MD  •  ondansetron (ZOFRAN) tablet 4 mg, 4 mg, Oral, Q6H PRN **OR** ondansetron (ZOFRAN) injection 4 mg, 4 mg, Intravenous, Q6H PRN, James Huntley MD  •  pantoprazole (PROTONIX) EC tablet 40 mg, 40 mg, Oral, Q AM, Jun Hawley MD, 40 mg at 07/12/21 0620  •  phenol (CHLORASEPTIC) 1.4 % liquid 2 spray, 2 spray, Mouth/Throat, Q2H PRN, James Huntley MD  •  polyethylene glycol (MIRALAX)  packet 17 g, 17 g, Oral, Daily, Jun Hawley MD, 17 g at 07/12/21 0859  •  potassium chloride (MICRO-K) CR capsule 20 mEq, 20 mEq, Oral, Daily, James Huntley MD, 20 mEq at 07/12/21 0859  •  promethazine (PHENERGAN) tablet 12.5 mg, 12.5 mg, Oral, Q6H PRN **OR** promethazine (PHENERGAN) suppository 12.5 mg, 12.5 mg, Rectal, Q6H PRN, James Huntley MD  •  saccharomyces boulardii (FLORASTOR) capsule 250 mg, 250 mg, Oral, Daily, James Huntley MD, 250 mg at 07/12/21 0858  •  sodium chloride 0.9 % flush 1-10 mL, 1-10 mL, Intravenous, PRN, James Huntley MD  •  sodium chloride 0.9 % flush 10 mL, 10 mL, Intravenous, Q12H, James Huntley MD, 10 mL at 07/12/21 0903  •  sodium chloride 0.9 % flush 10 mL, 10 mL, Intravenous, PRN, James Huntley MD  •  sodium chloride 0.9 % flush 3 mL, 3 mL, Intravenous, Q12H, James Huntley MD, 3 mL at 07/12/21 0903  •  sucralfate (CARAFATE) tablet 1 g, 1 g, Oral, 4x Daily AC & at Bedtime, James Huntley MD, 1 g at 07/12/21 0859  •  traMADol (ULTRAM) tablet 50 mg, 50 mg, Oral, TID, James Huntley MD, 50 mg at 07/12/21 0859  •  valACYclovir (VALTREX) tablet 500 mg, 500 mg, Oral, Q24H, James Huntley MD, 500 mg at 07/12/21 0858    Review of Systems:  Review of Systems   Constitutional: Negative for chills and fever.   HENT: Negative for rhinorrhea and sore throat.    Respiratory: Negative for cough and shortness of breath.    Cardiovascular: Negative for chest pain and palpitations.   Gastrointestinal: Negative for diarrhea, nausea and vomiting.   Genitourinary: Negative for frequency and urgency.   Musculoskeletal: Positive for myalgias. Negative for back pain.   Skin: Positive for wound. Negative for rash.   Neurological: Positive for weakness. Negative for dizziness and headaches.   Psychiatric/Behavioral: Negative for agitation and confusion.         OBJECTIVE     Vitals:    07/12/21 1100   BP:  101/50   Pulse: 73   Resp: 16   Temp: 98.2 °F (36.8 °C)   SpO2: 96%       PHYSICAL EXAM  Physical Exam  Vitals and nursing note reviewed.   Constitutional:       General: She is awake.      Appearance: She is obese.      Comments: BMI: 31.99   HENT:      Head: Normocephalic and atraumatic.   Eyes:      General: Lids are normal. Gaze aligned appropriately.   Cardiovascular:      Rate and Rhythm: Normal rate and regular rhythm.   Pulmonary:      Effort: Pulmonary effort is normal. No respiratory distress.   Abdominal:      General: There is no distension.      Palpations: Abdomen is soft.   Musculoskeletal:      Cervical back: Normal range of motion and neck supple.   Feet:      Right foot:      Skin integrity: No ulcer or blister.      Left foot:      Skin integrity: No ulcer or blister.   Skin:     General: Skin is warm and dry.      Findings: Wound present.             Comments: Right hip surgical incision    Dressing Appearance:  Moist drainage  Base: red, moist tissue and bone  Wound Tissue Color (%): 100% red tissue  Periwound: pink and blanchable  Periwound Temperature: warm  Periwound Skin Turgor: soft   Edges: Open  Wound Measurements (cm): 10.3cm x 2.5cm x 9.5cm  Tunneling (Depth (cm)/Location): None   Undermining (Depth (cm)/Location): None  Drainage Characteristics/Odor: Serosanguinous  Drainage Amount:  Moderate   Neurological:      Mental Status: She is alert and oriented to person, place, and time.   Psychiatric:         Attention and Perception: Attention normal.         Mood and Affect: Mood normal.         Speech: Speech normal.         Behavior: Behavior is cooperative.         Picture taken by staff:            Results Review:  Lab Results (last 48 hours)     Procedure Component Value Units Date/Time    Basic Metabolic Panel [[id number]]  (Abnormal) Collected: 07/12/21 0529    Specimen: Blood Updated: 07/12/21 0622     Glucose 87 mg/dL      BUN 12 mg/dL      Creatinine 0.73 mg/dL      Sodium 141  mmol/L      Potassium 3.5 mmol/L      Comment: Slight hemolysis detected by analyzer. Results may be affected.        Chloride 100 mmol/L      CO2 33.0 mmol/L      Calcium 9.0 mg/dL      eGFR Non African Amer 80 mL/min/1.73      BUN/Creatinine Ratio 16.4     Anion Gap 8.0 mmol/L     Narrative:      GFR Normal >60  Chronic Kidney Disease <60  Kidney Failure <15      Blood Culture - Blood, Arm, Right [[id number]] Collected: 07/06/21 2052    Specimen: Blood from Arm, Right Updated: 07/11/21 2130     Blood Culture No growth at 5 days    Blood Culture - Blood, Arm, Right [[id number]] Collected: 07/06/21 1946    Specimen: Blood from Arm, Right Updated: 07/11/21 2000     Blood Culture No growth at 5 days    Tissue / Bone Culture - Bone, Hip, Right [[id number]] Collected: 07/08/21 1859    Specimen: Bone from Hip, Right Updated: 07/11/21 0936     Tissue Culture No growth at 3 days     Gram Stain Few (2+) WBCs seen      No organisms seen    Anaerobic Culture - Bone, Hip, Right [[id number]] Collected: 07/08/21 1859    Specimen: Bone from Hip, Right Updated: 07/11/21 0934     Anaerobic Culture No anaerobes isolated at 3 days    Tissue / Bone Culture - Tissue, Hip, Right [[id number]]  (Abnormal)  (Susceptibility) Collected: 07/08/21 1858    Specimen: Tissue from Hip, Right Updated: 07/11/21 0847     Tissue Culture Scant growth (1+) Pseudomonas aeruginosa     Gram Stain Moderate (3+) WBCs seen      Few (2+) Gram negative bacilli    Susceptibility      Pseudomonas aeruginosa      LICHA      Cefepime Susceptible      Ceftazidime Susceptible      Ciprofloxacin Susceptible      Gentamicin Susceptible      Levofloxacin Susceptible      Piperacillin + Tazobactam Susceptible               Linear View                   Basic Metabolic Panel [[id number]]  (Normal) Collected: 07/11/21 0525    Specimen: Blood Updated: 07/11/21 0622     Glucose 95 mg/dL      BUN 13 mg/dL      Creatinine 0.77 mg/dL      Sodium 142 mmol/L      Potassium 3.7 mmol/L       Chloride 105 mmol/L      CO2 29.0 mmol/L      Calcium 8.6 mg/dL      eGFR Non African Amer 75 mL/min/1.73      BUN/Creatinine Ratio 16.9     Anion Gap 8.0 mmol/L     Narrative:      GFR Normal >60  Chronic Kidney Disease <60  Kidney Failure <15      Hemoglobin & Hematocrit, Blood [[id number]]  (Abnormal) Collected: 07/11/21 0525    Specimen: Blood Updated: 07/11/21 0602     Hemoglobin 8.3 g/dL      Hematocrit 26.3 %         Imaging Results (Last 72 Hours)     ** No results found for the last 72 hours. **             ASSESSMENT/PLAN       Examination and evaluation of wound(s) was performed.    DIAGNOSIS:     Osteomyelitis of right femur (CMS/HCC)    Chronic pain syndrome    Chronic deep vein thrombosis (DVT) of right lower extremity (CMS/HCC)    Transient alteration of awareness    Non-healing surgical wound, right hip        PLAN:     Start     Ordered    07/12/21 1151  Use Seat Cushion When Up In Chair  Continuous      07/12/21 1151    07/12/21 1151  Wound Vac  Every Mon-Wed-Fri     Question:  Settings:  Answer:  Continuous    07/12/21 1151    07/12/21 1150  Elevate Heels Off of Bed  Until Discontinued      07/12/21 1151          07/12/21 0000  Ambulatory Referral to Wound Clinic      07/12/21 1152               Discussed findings and treatment plan including risks, benefits, and treatment options with patient in detail. Patient agreed with treatment plan.      This document has been electronically signed by DANIELA Mullins on 7/12/2021 11:47 CDT

## 2021-07-12 NOTE — PLAN OF CARE
Goal Outcome Evaluation:  Plan of Care Reviewed With: patient        Progress: no change  Outcome Summary: Pt sitting up in chair at this time. C/o pain relieved with scheduled pain medication. A&Ox4. Up x1 with a walker. R upper arm PICC infusing ATB at this time. tolerating well. VSS. DVT in right leg. Eliquis for VTE prevention. Tolerating regular diet. Dressing clean dry and intact, changed this shift. PPP. RAMIREZ. lidocaine patch to lower back. Tele NS 70. call light within reach. Safety maintained.

## 2021-07-12 NOTE — PROGRESS NOTES
Infectious Diseases Progress Note    Patient:  Tawny Shin  YOB: 1955  MRN: [id number]   Admit date: 7/6/2021   Admitting Physician: Christos Min DO  Primary Care Physician: Davon Urrutia MD    Chief Complaint/Interval History: She is without new symptoms.  Likely to nursing home tomorrow.  She will get additional wound care and IV antibiotic treatment.  Interval notes reviewed.  Nutritional support.  Reevaluation for possible flap closure in 1 month.    Intake/Output Summary (Last 24 hours) at 7/12/2021 1725  Last data filed at 7/12/2021 0731  Gross per 24 hour   Intake --   Output 2300 ml   Net -2300 ml     Allergies:   Allergies   Allergen Reactions   • Atorvastatin Other (See Comments)     LEG CRAMPS     • Amoxicillin Rash   • Escitalopram Rash   • Nabumetone Rash   • Niacin Er Rash   • Penicillins Rash   • Simvastatin Rash     Current Scheduled Medications:   apixaban, 5 mg, Oral, Q12H  carvedilol, 25 mg, Oral, BID With Meals  cefepime, 2 g, Intravenous, Q8H  docusate sodium, 100 mg, Oral, BID  fluticasone, 1 spray, Nasal, Daily  furosemide, 40 mg, Oral, BID  gabapentin, 300 mg, Oral, Nightly  isosorbide mononitrate, 60 mg, Oral, BID  levothyroxine, 75 mcg, Oral, Q AM  lidocaine, 1 patch, Transdermal, Nightly  magnesium oxide, 400 mg, Oral, Daily  multivitamin with minerals, 1 tablet, Oral, Daily  pantoprazole, 40 mg, Oral, Q AM  polyethylene glycol, 17 g, Oral, Daily  potassium chloride, 20 mEq, Oral, Daily  saccharomyces boulardii, 250 mg, Oral, Daily  sodium chloride, 10 mL, Intravenous, Q12H  sodium chloride, 3 mL, Intravenous, Q12H  sucralfate, 1 g, Oral, 4x Daily AC & at Bedtime  traMADol, 50 mg, Oral, TID  valACYclovir, 500 mg, Oral, Q24H      Current PRN Medications:  •  acetaminophen **OR** acetaminophen  •  HYDROcodone-acetaminophen  •  HYDROcodone-acetaminophen  •  HYDROmorphone **AND** naloxone  •  magnesium hydroxide  •  Morphine **AND** naloxone  •  nitroglycerin  •   "ondansetron **OR** ondansetron  •  phenol  •  promethazine **OR** promethazine  •  sodium chloride  •  sodium chloride    Review of Systems no diarrhea or rash    Vital Signs:  /50 (BP Location: Left arm, Patient Position: Lying)   Pulse 73   Temp 98.2 °F (36.8 °C) (Oral)   Resp 16   Ht 167.6 cm (65.98\")   Wt 89.9 kg (198 lb 1.6 oz)   SpO2 96%   BMI 31.99 kg/m²     Physical Exam  Vital signs - reviewed.  Line/IV (PICC) site - No erythema, warmth, induration, or tenderness.  Right hip without surrounding erythema    Lab Results:  CBC:   Results from last 7 days   Lab Units 07/11/21  0525 07/09/21  0516 07/07/21  0550 07/06/21 1946   WBC 10*3/mm3  --   --  4.23 5.16   HEMOGLOBIN g/dL 8.3* 8.0* 8.5* 9.7*   HEMATOCRIT % 26.3* 25.5* 27.0* 31.5*   PLATELETS 10*3/mm3  --   --  238 297     BMP:  Results from last 7 days   Lab Units 07/12/21  0529 07/11/21  0525 07/09/21  0516 07/07/21  0550 07/06/21 1946   SODIUM mmol/L 141 142 142 141 139   POTASSIUM mmol/L 3.5 3.7 3.5 3.6 3.6   CHLORIDE mmol/L 100 105 104 103 100   CO2 mmol/L 33.0* 29.0 32.0* 31.0* 29.0   BUN mg/dL 12 13 12 10 11   CREATININE mg/dL 0.73 0.77 0.75 0.77 0.90   GLUCOSE mg/dL 87 95 92 88 136*   CALCIUM mg/dL 9.0 8.6 8.9 8.6 9.2   ALT (SGPT) U/L  --   --   --  6  --      Culture Results:   Blood Culture   Date Value Ref Range Status   07/06/2021 No growth at 5 days  Final   07/06/2021 No growth at 5 days  Final     Urine Culture   Date Value Ref Range Status   07/06/2021 >100,000 CFU/mL Mixed Selina Isolated  Final     Right hip tissue culture 7/8/2021:  Susceptibility     Pseudomonas aeruginosa     LICHA     Cefepime <=1 ug/ml Susceptible     Ceftazidime 4 ug/ml Susceptible     Ciprofloxacin 0.5 ug/ml Susceptible     Gentamicin <=1 ug/ml Susceptible     Levofloxacin 0.5 ug/ml Susceptible     Piperacillin + Tazobactam <=4 ug/ml Susceptible      Radiology: None    Additional Studies Reviewed: None    Impression:   1.  Chronic right hip " wound/possible osteomyelitis  2.  Pseudomonas aeruginosa recovered from operative debridement of right hip    Recommendations:   Okay with me to nursing home on July 12, 2021  Continue either twice daily dressing changes or wound VAC for right hip  Encourage oral intake protein  IV antibiotic recommendations outlined below    Nursing home antibiotic recommendations:  1.  Right hip infection/possible osteomyelitis.  Microbiology revealed Pseudomonas aeruginosa.  2.  IV access-PICC line  3.  Wound surgeon that evaluated her July 12, 2021-Amadeo Turner MD  4.  Antibiotic recommendation:  Ceftriaxone 2 g IV every 8 hours  Last dose of ceftriaxone on August 9, 2021 (4 weeks)  5.  Laboratory monitoring:  CMP, CBC, CRP every Monday while on antibiotic treatment  6.  Follow-up appointment 1 to 2 weeks after hospital discharge-okay with me for appointment to be via telehealth if better for patient and nursing home.    Elie Ohara MD

## 2021-07-13 VITALS
DIASTOLIC BLOOD PRESSURE: 59 MMHG | OXYGEN SATURATION: 96 % | HEIGHT: 66 IN | HEART RATE: 74 BPM | BODY MASS INDEX: 31.84 KG/M2 | RESPIRATION RATE: 18 BRPM | WEIGHT: 198.1 LBS | TEMPERATURE: 97.8 F | SYSTOLIC BLOOD PRESSURE: 152 MMHG

## 2021-07-13 LAB
BACTERIA SPEC ANAEROBE CULT: NORMAL
SARS-COV-2 RNA PNL SPEC NAA+PROBE: NOT DETECTED

## 2021-07-13 PROCEDURE — 97116 GAIT TRAINING THERAPY: CPT

## 2021-07-13 PROCEDURE — 87635 SARS-COV-2 COVID-19 AMP PRB: CPT | Performed by: INTERNAL MEDICINE

## 2021-07-13 PROCEDURE — 25010000002 CEFEPIME PER 500 MG: Performed by: INTERNAL MEDICINE

## 2021-07-13 PROCEDURE — 97110 THERAPEUTIC EXERCISES: CPT

## 2021-07-13 RX ORDER — PSEUDOEPHEDRINE HCL 30 MG
100 TABLET ORAL 2 TIMES DAILY
Start: 2021-07-13 | End: 2023-01-11

## 2021-07-13 RX ORDER — TRAMADOL HYDROCHLORIDE 50 MG/1
50 TABLET ORAL 3 TIMES DAILY
Qty: 9 TABLET | Refills: 0 | Status: SHIPPED | OUTPATIENT
Start: 2021-07-13 | End: 2021-07-16

## 2021-07-13 RX ORDER — POLYETHYLENE GLYCOL 3350 17 G/17G
17 POWDER, FOR SOLUTION ORAL DAILY
Start: 2021-07-14 | End: 2021-09-10

## 2021-07-13 RX ORDER — HYDROCODONE BITARTRATE AND ACETAMINOPHEN 7.5; 325 MG/1; MG/1
1 TABLET ORAL EVERY 6 HOURS PRN
Qty: 12 TABLET | Refills: 0 | Status: SHIPPED | OUTPATIENT
Start: 2021-07-13 | End: 2021-07-16

## 2021-07-13 RX ORDER — ACETAMINOPHEN 325 MG/1
650 TABLET ORAL EVERY 4 HOURS PRN
Status: ON HOLD
Start: 2021-07-13 | End: 2021-11-04

## 2021-07-13 RX ORDER — GABAPENTIN 300 MG/1
300 CAPSULE ORAL NIGHTLY
Qty: 3 CAPSULE | Refills: 0 | Status: SHIPPED | OUTPATIENT
Start: 2021-07-13 | End: 2021-09-10

## 2021-07-13 RX ADMIN — Medication 400 MG: at 09:02

## 2021-07-13 RX ADMIN — LIDOCAINE 1 PATCH: 50 PATCH CUTANEOUS at 09:02

## 2021-07-13 RX ADMIN — SUCRALFATE 1 G: 1 TABLET ORAL at 09:04

## 2021-07-13 RX ADMIN — SODIUM CHLORIDE, PRESERVATIVE FREE 10 ML: 5 INJECTION INTRAVENOUS at 09:04

## 2021-07-13 RX ADMIN — DOCUSATE SODIUM 100 MG: 100 CAPSULE ORAL at 09:02

## 2021-07-13 RX ADMIN — LEVOTHYROXINE SODIUM 75 MCG: 75 TABLET ORAL at 05:48

## 2021-07-13 RX ADMIN — APIXABAN 5 MG: 5 TABLET, FILM COATED ORAL at 09:02

## 2021-07-13 RX ADMIN — Medication 250 MG: at 09:02

## 2021-07-13 RX ADMIN — POTASSIUM CHLORIDE 20 MEQ: 750 CAPSULE, EXTENDED RELEASE ORAL at 09:02

## 2021-07-13 RX ADMIN — FUROSEMIDE 40 MG: 40 TABLET ORAL at 09:01

## 2021-07-13 RX ADMIN — CEFEPIME HYDROCHLORIDE 2 G: 2 INJECTION, POWDER, FOR SOLUTION INTRAVENOUS at 12:02

## 2021-07-13 RX ADMIN — SODIUM CHLORIDE, PRESERVATIVE FREE 3 ML: 5 INJECTION INTRAVENOUS at 09:03

## 2021-07-13 RX ADMIN — ISOSORBIDE MONONITRATE 60 MG: 60 TABLET, EXTENDED RELEASE ORAL at 09:02

## 2021-07-13 RX ADMIN — VALACYCLOVIR 500 MG: 500 TABLET, FILM COATED ORAL at 09:02

## 2021-07-13 RX ADMIN — CEFEPIME HYDROCHLORIDE 2 G: 2 INJECTION, POWDER, FOR SOLUTION INTRAVENOUS at 04:17

## 2021-07-13 RX ADMIN — SUCRALFATE 1 G: 1 TABLET ORAL at 12:02

## 2021-07-13 RX ADMIN — FLUTICASONE PROPIONATE 1 SPRAY: 50 SPRAY, METERED NASAL at 09:02

## 2021-07-13 RX ADMIN — POLYETHYLENE GLYCOL (3350) 17 G: 17 POWDER, FOR SOLUTION ORAL at 09:02

## 2021-07-13 RX ADMIN — PANTOPRAZOLE SODIUM 40 MG: 40 TABLET, DELAYED RELEASE ORAL at 05:48

## 2021-07-13 RX ADMIN — CARVEDILOL 25 MG: 25 TABLET, FILM COATED ORAL at 09:02

## 2021-07-13 RX ADMIN — Medication 1 TABLET: at 09:02

## 2021-07-13 RX ADMIN — HYDROCODONE BITARTRATE AND ACETAMINOPHEN 1 TABLET: 7.5; 325 TABLET ORAL at 12:16

## 2021-07-13 RX ADMIN — TRAMADOL HYDROCHLORIDE 50 MG: 50 TABLET, FILM COATED ORAL at 09:01

## 2021-07-13 NOTE — DISCHARGE SUMMARY
[facility] Medicine Services  DISCHARGE SUMMARY       Date of Admission: 7/6/2021  Date of Discharge:  7/13/2021  Primary Care Physician: Davon Urrutia MD    Presenting Problem/History of Present Illness:  Osteomyelitis of right femur, unspecified type (CMS/HCC) [M86.9]     Final Discharge Diagnoses:  Active Hospital Problems    Diagnosis    • **Osteomyelitis of right femur (CMS/HCC)    • Transient alteration of awareness    • Non-healing surgical wound, right hip    • Chronic deep vein thrombosis (DVT) of right lower extremity (CMS/HCC)    • Chronic pain syndrome        Consults:   #1 Dr Huntley, orthopedics  #2 Dr. Copeland, infectious disease  #3 Dr. Turner, plastic surgery    Procedures Performed:   #1 irrigation and debridement of right hip with biopsy of right greater trochanter by Dr. Huntley on 7/8    Pertinent Test Results:  Procedure Component Value Units Date/Time   MRI Hip Right Without Contrast [[id number]] Rick as Reviewed   Order Status: Completed Collected: 07/07/21 1645    Updated: 07/07/21 1651   Narrative:     EXAMINATION: MRI HIP RIGHT WO CONTRAST-       7/7/2021 3:23 PM CDT       HISTORY: Osteomyelitis suspected, hip, xray done; M86.9-Osteomyelitis,   unspecified; R41.82-Altered mental status, unspecified; Z78.9-Other   specified health status; R68.89-Other general symptoms and signs       Noncontrast MR imaging of the right hip.       Comparison is made with pelvis radiograph performed yesterday,   abdomen/pelvis CT from 5/26/2021, and pelvis/hip MRI from 2/9/2021.       Slightly increased joint fluid.       Pin tracks associated with greater trochanter hardware removal are again   seen.   There is increased marrow edema within the base of the right femoral   neck and the intertrochanteric region compatible with osteomyelitis   unless there has been recent instrumentation at the right hip.       There is extensive right gluteus muscle edema again seen and a  large   soft tissue wound adjacent to the right hip is noted.       No drainable abscess collection is seen.       Summary:   1. Large deep soft tissue wound adjacent to the right hip.   2. Diffuse right gluteus muscle edema with no drainable abscess   collection.   3. Increased marrow edema within the proximal right femur compatible   with osteomyelitis.   This report was finalized on 07/07/2021 16:48 by Dr. Jesus Manuel Santos MD.   CT Head Without Contrast [[id number]] Rick as Reviewed   Order Status: Completed Collected: 07/06/21 2015    Updated: 07/06/21 2021   Narrative:     EXAM: CT HEAD WO CONTRAST- - 7/6/2021 8:06 PM CDT       HISTORY: Altered mental status         COMPARISON: 6/22/2021.       DOSE LENGTH PRODUCT: 724 mGy cm. Automated exposure control was also   utilized to decrease patient radiation dose.       TECHNIQUE: Unenhanced CT images obtained from vertex to skull base with   multiplanar reformats.       FINDINGS:   No acute intracranial hemorrhage, midline shift, mass effect or large   territory cortical infarct. Ventricles, basilar cisterns and sulci have   proportional prominence suggesting volume loss. Periventricular and   subcortical white matter hypodensities, most likely due to chronic   microvascular disease. Vascular calcifications.       Thinning of the bilateral ocular lenses, which may be age-related or   postoperative. Paranasal sinus mucosal thickening. Small left mastoid   effusion. No right mastoid effusion. External auditory canals patent.   Calvarium appears intact. Subcutaneous tissues within normal limits.           Impression:     1. No acute intracranial findings.   2. Chronic microvascular changes, volume loss and vascular   calcifications.   3. Paranasal sinus mucosal thickening and small left mastoid effusion.   This report was finalized on 07/06/2021 20:18 by Dr Paulina Tavares MD.   XR Chest 1 View [[id number]] Rick as Reviewed   Order Status: Completed Collected: 07/06/21 2001     Updated: 07/06/21 2005   Narrative:     EXAM: XR CHEST 1 VW- - 7/6/2021 7:59 PM CDT       HISTORY: Altered mental status         COMPARISON: 6/22/2021.       TECHNIQUE:  1 images.  Frontal view of the chest.       FINDINGS:     Cardiac pulse generator at the left chest with 2 grossly intact leads.   No pneumothorax, pleural effusion or focal consolidation. Right upper   lung granuloma. Cardiac mediastinal silhouette appear within normal   limits. Corpectomy and fixation hardware at the cervical spine. No acute   bony finding.           Impression:     1. No acute cardiopulmonary findings.   This report was finalized on 07/06/2021 20:02 by Dr Paulina Tavares MD.   XR Pelvis 1 or 2 View [[id number]] Rick as Reviewed   Order Status: Completed Collected: 07/06/21 2002    Updated: 07/06/21 2010   Narrative:     EXAM: XR PELVIS 1 OR 2 VW- - 7/6/2021 7:59 PM CDT       HISTORY: recent infected hip, open wound         COMPARISON: 5/26/2021.       TECHNIQUE:  1 images.  AP view of the pelvis       FINDINGS:     Pelvis appears intact. Sacroiliac joints symmetric with degenerative   changes. Sacral arcuate lines limited due to technique and overlying   bowel gas. Partially visualized lumbar fixation hardware. Pubic   symphysis anatomic.       Lucency at the right femur, greater trochanter. There appears to be   interval progression of lucency compared to 5/26/2021 CT. Persistent   overlying soft tissue defect raises concern for chronic or treated   osteomyelitis.       Degenerative changes of the bilateral hip. Gluteal granuloma projecting   lateral to the left iliac wing.           Impression:     1. There appears to be interval progression of lucency at the right   femur greater trochanter compared to CT 5/26/2021. Persistent overlying   soft tissue defect raises concern for chronic or treated osteomyelitis.   This report was finalized on 07/06/2021 20:06 by Dr Paulina Tavares MD.       Chief Complaint on Day of Discharge:  "  Follow-up right hip wound/osteo-    History of Present Illness on Day of Discharge:   Patient is doing well and feels great.  No new issues or complaints.  Denies chest pain or shortness of breath.  No fevers or chills.  Tolerating p.o.    Hospital Course:  The patient is a 65 y.o. female with history of hypertension, hyperlipidemia, CAD who has had an ongoing issue with right hip wound, nonhealing.  Has already had dehiscence debridement.  She presented to the hospital on 7/6 with altered mental status and work-up showed ongoing issues with potential infection of the right hip site.  She was started on antibiotics and taken to the OR on 7/8 with irrigation and bone biopsy.  Cultures from the bone ended up growing Pseudomonas.  Blood cultures have been negative.  She is otherwise doing much better.  Assistance antibiotic selection made with infectious disease who recommends using cefepime 2 g IV every 8 hours for total 4 weeks.  This will take her antibiotics out through August 9.  She has a PICC in place.  She is doing well and is ready for discharge today.  She was also seen by plastic surgery here during stay we will follow her as an outpatient for possible flap/closure when infection healed.  Plastic surgery does not recommend wound VAC at this time.  Recommend twice daily dressing changes.    Condition on Discharge: Stable on room air    Physical Exam on Discharge:  /59 (BP Location: Left arm, Patient Position: Lying)   Pulse 74   Temp 97.8 °F (36.6 °C) (Temporal)   Resp 18   Ht 167.6 cm (65.98\")   Wt 89.9 kg (198 lb 1.6 oz)   SpO2 96%   BMI 31.99 kg/m²   Physical Exam  GEN: Awake, alert, interactive, in NAD  HEENT:  PERRLA, EOMI, Anicteric, Trachea midline  Lungs: no wheezing/rales/rhonchi  Heart: RRR, +S1/s2, no rub  ABD: soft, nt/nd, +BS, no guarding/rebound  Extremities: R hip dressed, trace LE edema  Skin: no rashes or lesions  Neuro: AAOx3, no focal deficits  Psych: normal mood & " affect    Discharge Disposition:  Skilled Nursing Facility (DC - External)    Discharge Medications:     Discharge Medications      New Medications      Instructions Start Date   acetaminophen 325 MG tablet  Commonly known as: TYLENOL   650 mg, Oral, Every 4 Hours PRN      cefepime 2 G/ ML solution  Commonly known as: MAXIPIME   2 g, Intravenous, Every 8 Hours      docusate sodium 100 MG capsule   100 mg, Oral, 2 Times Daily      HYDROcodone-acetaminophen 7.5-325 MG per tablet  Commonly known as: NORCO   1 tablet, Oral, Every 6 Hours PRN      polyethylene glycol 17 g packet  Commonly known as: MIRALAX   17 g, Oral, Daily   Start Date: July 14, 2021        Continue These Medications      Instructions Start Date   apixaban 5 MG tablet tablet  Commonly known as: ELIQUIS   1 twice daily      carvedilol 25 MG tablet  Commonly known as: COREG   25 mg, Oral, 2 Times Daily With Meals      fluticasone 50 MCG/ACT nasal spray  Commonly known as: FLONASE   1 spray, Nasal, Daily      furosemide 40 MG tablet  Commonly known as: LASIX   40 mg, Oral, Daily      gabapentin 300 MG capsule  Commonly known as: NEURONTIN   300 mg, Oral, Nightly      isosorbide mononitrate 60 MG 24 hr tablet  Commonly known as: IMDUR   60 mg, Oral, 2 Times Daily      levothyroxine 75 MCG tablet  Commonly known as: SYNTHROID, LEVOTHROID   75 mcg, Oral, Daily      lidocaine 5 %  Commonly known as: LIDODERM   1 patch, Transdermal, Every 24 Hours, Remove & Discard patch within 12 hours or as directed by MD       magnesium oxide 400 MG tablet  Commonly known as: MAG-OX   400 mg, Oral      multivitamin with minerals tablet tablet   1 tablet, Oral, Daily      nitroglycerin 0.4 MG SL tablet  Commonly known as: Nitrostat   0.4 mg, Sublingual, Every 5 Minutes PRN, Take no more than 3 doses in 15 minutes.      ondansetron 4 MG tablet  Commonly known as: ZOFRAN   4 mg, Oral, Every 6 Hours PRN      pantoprazole 40 MG EC tablet  Commonly known as: PROTONIX   40  mg, Oral, Daily      potassium chloride 20 MEQ CR tablet  Commonly known as: K-DUR,KLOR-CON   20 mEq, Oral, Daily      saccharomyces boulardii 250 MG capsule  Commonly known as: Florastor   250 mg, Oral, Daily      sucralfate 1 g tablet  Commonly known as: CARAFATE   1 g, Oral, 4 Times Daily Before Meals & Nightly      traMADol 50 MG tablet  Commonly known as: ULTRAM   50 mg, Oral, 3 Times Daily      valACYclovir 500 MG tablet  Commonly known as: VALTREX   500 mg, Oral, Every 24 Hours Scheduled         Stop These Medications    Movantik 25 MG tablet  Generic drug: Naloxegol Oxalate     salsalate 500 MG tablet  Commonly known as: DISALCID            Discharge Diet:    Dietary Orders (From admission, onward)     Start     Ordered    07/09/21 1800  Dietary Nutrition Supplements Boost Plus (Ensure Enlive, Ensure Plus); chocolate  2 Times Daily     Question Answer Comment   Select Supplement: Boost Plus (Ensure Enlive, Ensure Plus)    Flavor: chocolate        07/09/21 1644    07/08/21 2028  Diet Regular  Diet Effective Now     Question:  Diet Texture / Consistency  Answer:  Regular    07/08/21 2027                Discharge Care Plan/Instructions:   Complete IV antibiotics with cefepime 2 g IV every 8 hours through August 9 of 2021 which will complete 4 weeks.  Lab monitoring every Monday while antibiotics with CMP, CBC, CRP.  Follow-up in 2 weeks with Dr. Copeland.  Follow-up in 4 weeks with Dr. uTrner.  Follow-up with PCP and Ortho as needed.    Follow-up Appointments:   Future Appointments   Date Time Provider Department Center   10/7/2021 10:45 AM Davon Urrutia MD [facility]   11/29/2021  2:45 PM Bandar Shea MD [facility]   1/13/2022 11:15 AM [facility]       Test Results Pending at Discharge: None    Electronically signed by Christos Min DO, 07/13/21, 10:11 CDT.    Time: 34 minutes

## 2021-07-13 NOTE — PLAN OF CARE
Goal Outcome Evaluation:  Plan of Care Reviewed With: patient        Progress: no change  Outcome Summary: VSS. No change in nv/neuro status this shift. C/o intermit pain, sched med given.  Drsg changed w/d per md orders. Up to br, voiding. Safety maintained.

## 2021-07-13 NOTE — PLAN OF CARE
Goal Outcome Evaluation:  Plan of Care Reviewed With: patient        Progress: improving  Outcome Summary: PT tx completed. Pt supine in bed with no c/o. Beata bed mobility, CG sit<>stand, amb 45' x 3 with rwx Cg. Plan for discharge soon to SNF for con'td PT

## 2021-07-13 NOTE — PROGRESS NOTES
Continued Stay Note   [place]     Patient Name: Tawny Shin  MRN: [id number]  Today's Date: 7/13/2021    Admit Date: 7/6/2021    Discharge Plan     Row Name 07/13/21 1122       Plan    Final Discharge Disposition Code  03 - skilled nursing facility (SNF)    Final Note  Pt is being dcd back to [facility] today, skilled level. Pt will need a new Covid test prior to dc. Roxy with [facility] aware of IV abx. RN can call report to [phone]        Discharge Codes    No documentation.       Expected Discharge Date and Time     Expected Discharge Date Expected Discharge Time    Jul 13, 2021             OSIEL Pizano

## 2021-07-13 NOTE — CONSULTS
[facility]   Consult Note    Patient Name: Tawny Shin  : 1955  MRN: [id number]  Primary Care Physician:  Davon Urrutia MD  Referring Physician: No ref. provider found  Date of admission: 2021    Consults  Subjective   Subjective     Reason for Consult/ Chief Complaint: Hip Wound    History of Present Illness  Tawny Shin is a 65 y.o. female     Review of Systems     Personal History     Past Medical History:   Diagnosis Date   • Arthritis    • Asthma    • Chronic sinusitis    • Coronary artery disease    • Eustachian tube dysfunction    • Heart disease    • Herpes simplex    • Hyperlipidemia    • Hypertension    • Knee dislocation    • Labral tear of right hip joint    • Laryngitis sicca    • Laryngitis, chronic    • MI (myocardial infarction) (CMS/HCC)    • Otorrhea    • Sensorineural hearing loss    • Sjogren's disease (CMS/HCC)        Past Surgical History:   Procedure Laterality Date   • A-V CARDIAC PACEMAKER INSERTION     • ATRIAL CARDIAC PACEMAKER INSERTION     • CARDIAC CATHETERIZATION     • CATARACT EXTRACTION     • CERVICAL CORPECTOMY N/A 3/3/2021    Procedure: CERVICAL 6 CORPECTOMY WITH TITANIUM CAGE WITH NEURO MONITORING;  Surgeon: Bandar Shea MD;  Location:  [facility] OR;  Service: Neurosurgery;  Laterality: N/A;   • COLONOSCOPY  2011    One fold in the ascending colon which showed ulcer otherwise normal exam   • COLONOSCOPY  2004    Normal exam repeat in five years   • CORONARY ANGIOPLASTY WITH STENT PLACEMENT      X 2;  &    • ENDOSCOPY  07/10/2014    Normal exam   • HIP ABDUCTION TENOTOMY BILATERAL Right 2021    Procedure: RIGHT HIP GLUTEUS MEDLUS / MINIMUS REPAIR, POSSIBLE ACHILLES ALLOGRAFT;  Surgeon: Nino Carlson MD;  Location:  [facility] OR;  Service: Orthopedics;  Laterality: Right;   • INCISION AND DRAINAGE HIP Right 2021    Procedure: HIP INCISION AND DRAINAGE;  Surgeon: Nino Carlson MD;  Location:  [facility] OR;  Service: Orthopedics;   Laterality: Right;   • INCISION AND DRAINAGE OF WOUND Right 7/8/2021    Procedure: INCISION AND DRAINAGE WOUND RIGHT HIP;  Surgeon: James Huntley MD;  Location:  [facility] OR;  Service: Orthopedics;  Laterality: Right;   • JOINT REPLACEMENT     • LUMBAR DISCECTOMY Right 3/23/2021    Procedure: LUMBAR DISCECTOMY MICRO, Lumbar 1/2 right;  Surgeon: Bandar Shea MD;  Location:  [facility] OR;  Service: Neurosurgery;  Laterality: Right;   • LUMBAR FUSION N/A 1/19/2018    Procedure: L3-4,L4-5 DECOMPRESSION, POSTERIOR SPINAL FUSION WITH INSTRUMENTATION;  Surgeon: Fortino Oropeza MD;  Location:  [facility] OR;  Service:    • LUMBAR LAMINECTOMY WITH FUSION Left 1/17/2018    Procedure: LEFT L3-4 L4-5 LATERAL LUMBAR INTERBODY FUSION;  Surgeon: Fortino Oropeza MD;  Location:  [facility] OR;  Service:    • MYRINGOTOMY W/ TUBES  09/04/2014    TUBES NO LONGER IN PLACE   • OTHER SURGICAL HISTORY      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 so hardware was removed and spacers were placed   • REPLACEMENT TOTAL KNEE Right        Family History: family history includes Cancer in her mother; Heart disease in her father. Otherwise pertinent FHx was reviewed and not pertinent to current issue.    Social History:  reports that she has never smoked. She has never used smokeless tobacco. She reports that she does not drink alcohol and does not use drugs.    Home Medications:   Naloxegol Oxalate, apixaban, carvedilol, fluticasone, furosemide, gabapentin, isosorbide mononitrate, levothyroxine, lidocaine, magnesium oxide, multivitamin with minerals, nitroglycerin, ondansetron, pantoprazole, potassium chloride, saccharomyces boulardii, salsalate, sucralfate, traMADol, and valACYclovir    Allergies:  Allergies   Allergen Reactions   • Atorvastatin Other (See Comments)     LEG CRAMPS     • Amoxicillin Rash   • Escitalopram Rash   • Nabumetone Rash   • Niacin Er Rash   • Penicillins Rash   • Simvastatin Rash       Objective    Objective      Vitals:  Temp:  [97.7 °F (36.5 °C)-98.2 °F (36.8 °C)] 98.1 °F (36.7 °C)  Heart Rate:  [67-75] 75  Resp:  [16-18] 18  BP: (101-136)/(49-70) 136/70    Physical Exam  Patient is alert and in NAD  Non-labored breathing on room air.  Conversant.    There is a large wound on her right hip.  This wound is grayson-shaped at the skin, measuring approximately 12 cm x 10 cm.  The femur is visible at the base of the wound and there is direct communication of the wound margins with the hip bursa.  The wound appears clean. There is no foul odor or drainage.  Granulation tissue is developing along most margins.  Glut is viaualized within the wound posteriorly and tensor fascia comprises the anterior margin.        Result Review    Result Review:  I have personally reviewed the results from the time of this admission to 7/12/2021 19:35 CDT and agree with these findings:  [x]  Laboratory  []  Microbiology  [x]  Radiology  []  EKG/Telemetry   []  Cardiology/Vascular   [x]  Pathology  []  Old records  []  Other:  Most notable findings include:   Assessment/Plan   Assessment / Plan     Brief Patient Summary:  Tawny Shin is a 65 y.o. female who   Active Hospital Problems:  Active Hospital Problems    Diagnosis    • **Osteomyelitis of right femur (CMS/HCC)    • Transient alteration of awareness    • Non-healing surgical wound, right hip    • Chronic deep vein thrombosis (DVT) of right lower extremity (CMS/HCC)    • Chronic pain syndrome      Impression & Plan:   Mrs. Shin is a very pleasant 65 year old female with a chronic wound of her right hip.  The wound developed following a surgery on her hip this past February.  It has never been closed since that time.  The patient states that she has been on numerous antibiotic overs the course of the last 6 months.  She underwent I&D of her wound last Thursday, 7/8 with Dr. James Huntley.  At that time, previously placed bone anchors were removed and the wound margins were debrided.   Bone cultures obtained during the procedure subsequently cultured pan-sensitive pseudomonas, for which a PICC line has been placed and IV antibiotic therapy initiated.      The patient has had numerous barriers to healing this wound.  I have discussed these barriers with her in detail including:    Retained colonized hardware (now removed)  Osteomyelitis (undergoing appropriate treatment currently)  History of DMARD and steroid use for RA control  Protein malnutrition    I have discussed with the patient that appropriate steps are being taken to address her wound with removing the hardware and initiating IV antibiotics.  I have stressed the importance or optimizing her nutrition.  She should be taking at least 1.5 g/kg of protein daily.  I have also advised her to refrain from restarting DMARDs and/or steroids until the wound is healed.  I agree with the decision to transition to BID dressing changes for wound care.  I do not recommend replacing the wound vac while infection is being treated.      Numerous regional options exist for potential future flap coverage procedures. These options include TFL rotation, gluteal turnover, rectus femoris, or ALT flaps.  CTa or or MRA may be indicated to confirm normal vascular anatomy preoperatively demending on the flap choice.   I have counseled the patient that it is possible that she could potentially heal this wound on her own if she focuses on addressing and optimizing the above-mentioned factors.  We can also revisit the idea of flap coverage as she nears the end of her antibiotic course, although her cardiac history does give me some pause to offering her a lengthy flap coverage procedure.  I will have her come see me in clinic in 4 weeks to reassess her wound.  We will continue discussion at that time.            Electronically signed by Amadeo Turner MD, 07/12/21, 7:35 PM CDT.

## 2021-07-13 NOTE — PLAN OF CARE
Goal Outcome Evaluation:  Plan of Care Reviewed With: patient        Progress: improving  Outcome Summary: Malnutrition assessment completed. Pt reports appetite improving. She is drinking Boost supplements. Edu provided regarding protein needs and foods to focus on to obtain adequate protein. Will continue to follow.

## 2021-07-13 NOTE — PLAN OF CARE
Goal Outcome Evaluation:  Plan of Care Reviewed With: patient        Progress: improving  Outcome Summary: Pt sitting up in chair at this time. C/o pain relieved with scheduled pain medication. A&Ox4. Up x1 with a walker. R upper arm PICC infusing ATB at this time. tolerating well. VSS. DVT in right leg. Eliquis for VTE prevention. Tolerating regular diet. Dressing clean dry and intact. PPP. RAMIREZ. lidocaine patch to lower back. Tele NS 70. call light within reach. Safety maintained.

## 2021-07-13 NOTE — PLAN OF CARE
Goal Outcome Evaluation:  Plan of Care Reviewed With: patient        Progress: improving  Outcome Summary: Pt sitting up in chair at this time. C/o pain relieved with scheduled pain medication. A&Ox4. Up x1 with a walker. R upper arm PICC infusing ATB at this time. tolerating well. VSS. DVT in right leg. Eliquis for VTE prevention. Tolerating regular diet. Dressing clean dry and intact. PPP. RAMIREZ. lidocaine patch to lower back. Tele NS 70. call light within reach. Safety maintained. Stable for discharge.

## 2021-07-13 NOTE — PROGRESS NOTES
Malnutrition Severity Assessment    Patient Name:  Tawny Shin  YOB: 1955  MRN: [id number]  Admit Date:  7/6/2021    Patient meets criteria for : Moderate (non-severe) Malnutrition (secondary signs of malnutrition: nonhealing wound)        Malnutrition Severity Assessment  Malnutrition Type: Chronic Disease - Related Malnutrition     Malnutrition Type (last 8 hours)      Malnutrition Severity Assessment     Row Name 07/13/21 1144       Malnutrition Severity Assessment    Malnutrition Type  Chronic Disease - Related Malnutrition    Row Name 07/13/21 1144       Insufficient Energy Intake     Insufficient Energy Intake Findings  Moderate    Insufficient Energy Intake   <75% of est. energy requirement for > or equal to 3 months    Row Name 07/13/21 1144       Unintentional Weight Loss     Unintentional Weight Loss   -- documented loss of 11 lb (5.26%) loss over 6 months; Pt reporting wt loss greater than this.    Row Name 07/13/21 1144       Muscle Loss    Loss of Muscle Mass Findings  Moderate    Episcopalian Region  Moderate - slight depression    Clavicle Bone Region  Moderate - some protrusion in females, visible in males    Row Name 07/13/21 1144       Fat Loss    Subcutaneous Fat Loss Findings  Moderate    Orbital Region   Moderate -  somewhat hollowness, slightly dark circles    Row Name 07/13/21 1144       Fluid Accumulation (Edema)    Fluid Acumulation Findings  Moderate    Fluid Accumulation   Mild equals 1+ pitting edema    Row Name 07/13/21 1144       Criteria Met (Must meet criteria for severity in at least 2 of these categories: M Wasting, Fat Loss, Fluid, Secondary Signs, Wt. Status, Intake)    Patient meets criteria for   Moderate (non-severe) Malnutrition secondary signs of malnutrition: nonhealing wound          Electronically signed by:  Saimra Romero RDN, LD  07/13/21 11:47 CDT

## 2021-07-13 NOTE — PLAN OF CARE
Goal Outcome Evaluation:  Plan of Care Reviewed With: other (see comments) (nursing)        Progress: no change  Outcome Summary: PT wound order received. Per nsg and plastic surgery notes, w/v not recommended at this time. PT to sign off on wound care. Please reconsult if status changes.

## 2021-07-13 NOTE — THERAPY TREATMENT NOTE
Acute Care - Physical Therapy Treatment Note  [facility]     Patient Name: Tawny Shin  : 1955  MRN: [id number]  Today's Date: 2021   Onset of Illness/Injury or Date of Surgery: 21       PT Assessment (last 12 hours)      PT Evaluation and Treatment     Row Name 21 0950          Physical Therapy Time and Intention    Subjective Information  no complaints  -KJ     Document Type  therapy note (daily note)  -KJ     Mode of Treatment  physical therapy  -KJ     Patient Effort  good  -KJ     Row Name 21 0950          General Information    Existing Precautions/Restrictions  fall  -KJ     Row Name 21 0950          Pain Scale: Word Pre/Post-Treatment    Pain: Word Scale, Pretreatment  2 - mild pain  -KJ     Posttreatment Pain Rating  4 - moderate pain  -KJ     Pain Location - Side  Left;Right  -KJ     Pain Location  hip both knees  -KJ     Row Name 21 0950          Bed Mobility    Supine-Sit Crestline (Bed Mobility)  verbal cues;supervision  -KJ     Sit-Supine Crestline (Bed Mobility)  minimum assist (75% patient effort);verbal cues  -KJ     Row Name 21 0950          Transfers    Sit-Stand Crestline (Transfers)  supervision  -KJ     Stand-Sit Crestline (Transfers)  independent  -KJ     Row Name 21 0950          Sit-Stand Transfer    Assistive Device (Sit-Stand Transfers)  walker, front-wheeled  -KJ     Row Name 21 0950          Stand-Sit Transfer    Assistive Device (Stand-Sit Transfers)  walker, front-wheeled  -KJ     Row Name 21 0950          Gait/Stairs (Locomotion)    Crestline Level (Gait)  verbal cues;contact guard;supervision  -KJ     Assistive Device (Gait)  walker, front-wheeled  -KJ     Distance in Feet (Gait)  45' x 3  -KJ     Deviations/Abnormal Patterns (Gait)  antalgic;base of support, narrow;stride length decreased;gait speed decreased  -KJ     Right Sided Gait Deviations  heel strike decreased;forward flexed posture  -KJ      Comment (Gait/Stairs)  cues for gait mechanics  -KJ     Row Name 07/13/21 0950          Motor Skills    Therapeutic Exercise  hip;ankle;knee  -KJ     Row Name 07/13/21 0950          Hip (Therapeutic Exercise)    Hip (Therapeutic Exercise)  AAROM (active assistive range of motion);AROM (active range of motion)  -KJ     Hip AROM (Therapeutic Exercise)  left  -KJ     Hip AAROM (Therapeutic Exercise)  right  -KJ     Row Name 07/13/21 0950          Knee (Therapeutic Exercise)    Knee (Therapeutic Exercise)  AROM (active range of motion)  -KJ     Knee AROM (Therapeutic Exercise)  bilateral limited on R  -KJ     Row Name 07/13/21 0950          Ankle (Therapeutic Exercise)    Ankle (Therapeutic Exercise)  AROM (active range of motion)  -KJ     Row Name             Wound 02/09/21 1715 Right hip Incision    Wound - Properties Group Placement Date: 02/09/21  - Placement Time: 1715  -SH Present on Hospital Admission: Y  -SH Side: Right  -SH Location: hip  -SH Primary Wound Type: Incision  -SH    Retired Wound - Properties Group Date first assessed: 02/09/21  - Time first assessed: 1715  -SH Present on Hospital Admission: Y  -SH Side: Right  -SH Location: hip  -SH Primary Wound Type: Incision  -SH    Row Name 07/13/21 0950          Positioning and Restraints    Pre-Treatment Position  in bed  -KJ     Post Treatment Position  bed  -KJ     In Bed  call light within reach  -KJ       User Key  (r) = Recorded By, (t) = Taken By, (c) = Cosigned By    Initials Name Provider Type    Emilee Gloria PTA Physical Therapy Assistant    Laurie Ritter, RN Registered Nurse        Physical Therapy Education                 Title: PT OT SLP Therapies (In Progress)     Topic: Physical Therapy (In Progress)     Point: Mobility training (Done)     Learning Progress Summary           Patient Acceptance, E, VU by KINGS at 7/8/2021 0831    Comment: progression with amb    Acceptance, E, VU by MS at 7/7/2021 1511    Comment: role of PT in her  care                   Point: Home exercise program (Not Started)     Learner Progress:  Not documented in this visit.          Point: Body mechanics (Not Started)     Learner Progress:  Not documented in this visit.          Point: Precautions (Not Started)     Learner Progress:  Not documented in this visit.                      User Key     Initials Effective Dates Name Provider Type Discipline    MS 06/19/18 -  Nicole Olson R, PT, DPT, NCS Physical Therapist PT    KINGS 12/08/16 -  Garcia Francis, PTA Physical Therapy Assistant PT              PT Recommendation and Plan     Plan of Care Reviewed With: patient  Progress: improving  Outcome Summary: PT tx completed. Pt supine in bed with no c/o. Beata bed mobility, CG sit<>stand, amb 45' x 3 with rwx Cg. Plan for discharge soon to SNF for con'td PT  Outcome Measures     Row Name 07/12/21 1400 07/11/21 0759          How much help from another is currently needed...    Putting on and taking off regular lower body clothing?  3  -TS  3  -CJ     Bathing (including washing, rinsing, and drying)  3  -TS  2  -CJ     Toileting (which includes using toilet bed pan or urinal)  3  -TS  3  -CJ     Putting on and taking off regular upper body clothing  4  -TS  4  -CJ     Taking care of personal grooming (such as brushing teeth)  4  -TS  4  -CJ     Eating meals  4  -TS  4  -CJ     AM-PAC 6 Clicks Score (OT)  21  -TS  20  -CJ        Functional Assessment    Outcome Measure Options  --  AM-PAC 6 Clicks Daily Activity (OT)  -CJ       User Key  (r) = Recorded By, (t) = Taken By, (c) = Cosigned By    Initials Name Provider Type    CJ Mahendra Elizalde COTA/L Occupational Therapy Assistant    TS Carolee Giordano COTA/L Occupational Therapy Assistant           Time Calculation:   PT Charges     Row Name 07/13/21 1046             Time Calculation    Start Time  0950  -KJ      Stop Time  1016  -KJ      Time Calculation (min)  26 min  -KJ      PT Received On  07/12/21  -KJ      PT  Goal Re-Cert Due Date  07/17/21  -AGUSTINA         Time Calculation- PT    Total Timed Code Minutes- PT  26 minute(s)  -KJ        User Key  (r) = Recorded By, (t) = Taken By, (c) = Cosigned By    Initials Name Provider Type    Emilee Gloria, SERENA Physical Therapy Assistant        Therapy Charges for Today     Code Description Service Date Service Provider Modifiers Qty    55382801328 HC GAIT TRAINING EA 15 MIN 7/13/2021 Emilee Paul PTA GP 1    57436822105 HC PT THER PROC EA 15 MIN 7/13/2021 Emilee Paul, SERENA GP 1          PT G-Codes  Outcome Measure Options: AM-PAC 6 Clicks Daily Activity (OT)  AM-PAC 6 Clicks Score (PT): 16  AM-PAC 6 Clicks Score (OT): 21    Emilee Paul PTA  7/13/2021

## 2021-07-14 NOTE — THERAPY DISCHARGE NOTE
Acute Care - Physical Therapy Discharge Summary  [facility]       Patient Name: Tawny Shin  : 1955  MRN: [id number]    Today's Date: 2021  Onset of Illness/Injury or Date of Surgery: 21       Referring Physician: Dr. Hawley      Admit Date: 2021      PT Recommendation and Plan    Visit Dx:    ICD-10-CM ICD-9-CM   1. Osteomyelitis of right femur, unspecified type (CMS/HCC)  M86.9 730.25   2. Altered mental status, unspecified altered mental status type  R41.82 780.97   3. Decreased activities of daily living (ADL)  Z78.9 V49.89   4. Decreased functional activity tolerance  R68.89 780.99   5. Osteomyelitis (CMS/HCC)  M86.9 730.20   6. Non-healing surgical wound, subsequent encounter  T81.89XD V58.89     998.83       Outcome Measures     Row Name 21 1400 21 0759          How much help from another is currently needed...    Putting on and taking off regular lower body clothing?  3  -TS  3  -CJ     Bathing (including washing, rinsing, and drying)  3  -TS  2  -CJ     Toileting (which includes using toilet bed pan or urinal)  3  -TS  3  -CJ     Putting on and taking off regular upper body clothing  4  -TS  4  -CJ     Taking care of personal grooming (such as brushing teeth)  4  -TS  4  -CJ     Eating meals  4  -TS  4  -CJ     AM-PAC 6 Clicks Score (OT)  21  -TS  20  -CJ        Functional Assessment    Outcome Measure Options  --  AM-PAC 6 Clicks Daily Activity (OT)  -CJ       User Key  (r) = Recorded By, (t) = Taken By, (c) = Cosigned By    Initials Name Provider Type    CJ Mahendra Elizalde COTA/L Occupational Therapy Assistant    TS Carolee Giordano COTA/L Occupational Therapy Assistant              PT Rehab Goals     Row Name 21 0657             Bed Mobility Goal 1 (PT)    Activity/Assistive Device (Bed Mobility Goal 1, PT)  bed mobility activities, all  -AB      Faulk Level/Cues Needed (Bed Mobility Goal 1, PT)  independent  -AB      Time Frame (Bed  Mobility Goal 1, PT)  long term goal (LTG);by discharge  -AB      Progress/Outcomes (Bed Mobility Goal 1, PT)  goal not met  -AB         Transfer Goal 1 (PT)    Activity/Assistive Device (Transfer Goal 1, PT)  sit-to-stand/stand-to-sit;bed-to-chair/chair-to-bed  -AB      Jackson Level/Cues Needed (Transfer Goal 1, PT)  modified independence  -AB      Time Frame (Transfer Goal 1, PT)  long term goal (LTG);by discharge  -AB      Progress/Outcome (Transfer Goal 1, PT)  goal not met  -AB         Gait Training Goal 1 (PT)    Activity/Assistive Device (Gait Training Goal 1, PT)  gait (walking locomotion);assistive device use;decrease fall risk;increase endurance/gait distance;improve balance and speed;walker, rolling  -AB      Jackson Level (Gait Training Goal 1, PT)  modified independence  -AB      Distance (Gait Training Goal 1, PT)  100ft  -AB      Time Frame (Gait Training Goal 1, PT)  long term goal (LTG);by discharge  -AB      Progress/Outcome (Gait Training Goal 1, PT)  goal not met  -AB        User Key  (r) = Recorded By, (t) = Taken By, (c) = Cosigned By    Initials Name Provider Type Discipline    Kasie Carlos PTA Physical Therapy Assistant PT              PT Discharge Summary  Anticipated Discharge Disposition (PT): assisted living, home with 24/7 care, skilled nursing facility  Reason for Discharge: Discharge from facility  Outcomes Achieved: Refer to plan of care for updates on goals achieved  Discharge Destination: SNF      Kasie Perkins PTA   7/14/2021

## 2021-07-14 NOTE — THERAPY DISCHARGE NOTE
Acute Care - Occupational Therapy Discharge Summary  [facility]     Patient Name: Tawny Shin  : 1955  MRN: [id number]    Today's Date: 2021  Onset of Illness/Injury or Date of Surgery: 21    Date of Referral to OT: 21  Referring Physician: Dr. Hawley      Admit Date: 2021        OT Recommendation and Plan    Visit Dx:    ICD-10-CM ICD-9-CM   1. Osteomyelitis of right femur, unspecified type (CMS/HCC)  M86.9 730.25   2. Altered mental status, unspecified altered mental status type  R41.82 780.97   3. Decreased activities of daily living (ADL)  Z78.9 V49.89   4. Decreased functional activity tolerance  R68.89 780.99   5. Osteomyelitis (CMS/HCC)  M86.9 730.20   6. Non-healing surgical wound, subsequent encounter  T81.89XD V58.89     998.83               OT Rehab Goals     Row Name 21 0900             Transfer Goal 1 (OT)    Activity/Assistive Device (Transfer Goal 1, OT)  toilet;walk-in shower;shower chair  -TS      Wirt Level/Cues Needed (Transfer Goal 1, OT)  standby assist  -TS      Time Frame (Transfer Goal 1, OT)  long term goal (LTG);10 days  -TS      Progress/Outcome (Transfer Goal 1, OT)  goal met  -TS         Bathing Goal 1 (OT)    Activity/Device (Bathing Goal 1, OT)  bathing skills, all;shower chair  -TS      Wirt Level/Cues Needed (Bathing Goal 1, OT)  supervision required  -TS      Time Frame (Bathing Goal 1, OT)  long term goal (LTG);10 days  -TS      Progress/Outcomes (Bathing Goal 1, OT)  goal met  -TS         Dressing Goal 1 (OT)    Activity/Device (Dressing Goal 1, OT)  lower body dressing  -TS      Wirt/Cues Needed (Dressing Goal 1, OT)  standby assist  -TS      Time Frame (Dressing Goal 1, OT)  long term goal (LTG);10 days  -TS      Progress/Outcome (Dressing Goal 1, OT)  goal met  -TS        User Key  (r) = Recorded By, (t) = Taken By, (c) = Cosigned By    Initials Name Provider Type Discipline    TS Carolee Giordano, GALLITO/L  Occupational Therapy Assistant OT          Outcome Measures     Row Name 07/12/21 1400             How much help from another is currently needed...    Putting on and taking off regular lower body clothing?  3  -TS      Bathing (including washing, rinsing, and drying)  3  -TS      Toileting (which includes using toilet bed pan or urinal)  3  -TS      Putting on and taking off regular upper body clothing  4  -TS      Taking care of personal grooming (such as brushing teeth)  4  -TS      Eating meals  4  -TS      AM-PAC 6 Clicks Score (OT)  21  -TS        User Key  (r) = Recorded By, (t) = Taken By, (c) = Cosigned By    Initials Name Provider Type    TS Carolee Giordano COTA/L Occupational Therapy Assistant          Timed Therapy Charges  Total Units: 4    Charges  Total Units: 4    Procedure Name Documented Minutes Units Code    HC OT SELF CARE/MGMT/TRAIN EA 15 MIN 55  4    60871 (CPT®)               Documented Minutes  Total Minutes: 55    Therapy Provided Minutes    01950 - OT Self Care/Mgmt Minutes 55                    OT Discharge Summary  Anticipated Discharge Disposition (OT): skilled nursing facility  Reason for Discharge: Discharge from facility  Outcomes Achieved: Refer to plan of care for updates on goals achieved  Discharge Destination: SNF      GUALBERTO Narvaez  7/14/2021

## 2021-07-15 ENCOUNTER — OFFICE VISIT (OUTPATIENT)
Dept: WOUND CARE | Facility: HOSPITAL | Age: 66
End: 2021-07-15

## 2021-07-15 DIAGNOSIS — I10 ESSENTIAL (PRIMARY) HYPERTENSION: ICD-10-CM

## 2021-07-15 DIAGNOSIS — I25.10 ATHEROSCLEROSIS OF NATIVE CORONARY ARTERY WITHOUT ANGINA PECTORIS, UNSPECIFIED WHETHER NATIVE OR TRANSPLANTED HEART: ICD-10-CM

## 2021-07-15 DIAGNOSIS — E78.2 MIXED HYPERLIPIDEMIA: ICD-10-CM

## 2021-07-15 DIAGNOSIS — S71.001D UNSPECIFIED OPEN WOUND, RIGHT HIP, SUBSEQUENT ENCOUNTER: ICD-10-CM

## 2021-07-15 LAB
BACTERIA SPEC ANAEROBE CULT: ABNORMAL
BACTERIA SPEC ANAEROBE CULT: ABNORMAL

## 2021-07-15 PROCEDURE — 11045 DBRDMT SUBQ TISS EACH ADDL: CPT | Performed by: NURSE PRACTITIONER

## 2021-07-15 PROCEDURE — 11042 DBRDMT SUBQ TIS 1ST 20SQCM/<: CPT | Performed by: NURSE PRACTITIONER

## 2021-08-11 ENCOUNTER — TELEPHONE (OUTPATIENT)
Dept: INTERNAL MEDICINE | Facility: CLINIC | Age: 66
End: 2021-08-11

## 2021-08-26 ENCOUNTER — TRANSITIONAL CARE MANAGEMENT TELEPHONE ENCOUNTER (OUTPATIENT)
Dept: CALL CENTER | Facility: HOSPITAL | Age: 66
End: 2021-08-26

## 2021-08-26 ENCOUNTER — READMISSION MANAGEMENT (OUTPATIENT)
Dept: CALL CENTER | Facility: HOSPITAL | Age: 66
End: 2021-08-26

## 2021-08-26 NOTE — OUTREACH NOTE
Prep Survey      Responses   [facility] facility patient discharged from?  Non-BH   Is LACE score < 7 ?  Non-BH Discharge   Emergency Room discharge w/ pulse ox?  No   Eligibility  [facility]   Date of Admission  07/21/21   Date of Discharge  08/25/21   Discharge diagnosis  Osteomyelitis of right femur    Does the patient have one of the following disease processes/diagnoses(primary or secondary)?  Other   Prep survey completed?  Yes          Luz Marina Benavides RN

## 2021-08-26 NOTE — OUTREACH NOTE
Call Center TCM Note      Responses   [facility] patient discharged from?  Non-   Does the patient have one of the following disease processes/diagnoses(primary or secondary)?  Other   TCM attempt successful?  Yes   Discharge diagnosis  Osteomyelitis of right femur    Meds reviewed with patient/caregiver?  Yes   Is the patient having any side effects they believe may be caused by any medication additions or changes?  No   Does the patient have all medications ordered at discharge?  Yes   Is the patient taking all medications as directed (includes completed medication regime)?  Yes   Does the patient have a primary care provider?   Yes   Does the patient have an appointment with their PCP within 7 days of discharge?  Yes   Comments regarding PCP  TCM FWP with PCP ofc is 08/31/2021. Pt in  [facility] 07/06-07/13/2021, and SNF 07/13-08/25/2021.   Has the patient kept scheduled appointments due by today?  N/A   Has home health visited the patient within 72 hours of discharge?  N/A   Psychosocial issues?  No   Did the patient receive a copy of their discharge instructions?  Yes   Nursing interventions  Reviewed instructions with patient   What is the patient's perception of their health status since discharge?  Worsening   Is the patient/caregiver able to teach back signs and symptoms related to disease process for when to call PCP?  Yes   Is the patient/caregiver able to teach back signs and symptoms related to disease process for when to call 911?  Yes   Is the patient/caregiver able to teach back the hierarchy of who to call/visit for symptoms/problems? PCP, Specialist, Home health nurse, Urgent Care, ED, 911  Yes   If the patient is a current smoker, are they able to teach back resources for cessation?  Not a smoker   TCM call completed?  Yes   Wrap up additional comments  Pt states arthritis really bothering her. Of more concern since coming home yesterday, feels her hip wound (chronic) is more open and also  draining. Pt is call her Infectious Disease MD now to see what to do. Infection made it to bone at last hospital stay. TCM FWP with PCP ofc is 08/31/2021. Pt in  [facility] 07/06-07/13/2021, and SNF 07/13-08/25/2021.          Skye Flynn MA    8/26/2021, 12:00 CDT

## 2021-08-27 ENCOUNTER — HOME CARE VISIT (OUTPATIENT)
Dept: HOME HEALTH SERVICES | Facility: CLINIC | Age: 66
End: 2021-08-27

## 2021-08-27 ENCOUNTER — TRANSCRIBE ORDERS (OUTPATIENT)
Dept: HOME HEALTH SERVICES | Facility: HOME HEALTHCARE | Age: 66
End: 2021-08-27

## 2021-08-27 ENCOUNTER — HOME HEALTH ADMISSION (OUTPATIENT)
Dept: HOME HEALTH SERVICES | Facility: HOME HEALTHCARE | Age: 66
End: 2021-08-27

## 2021-08-27 VITALS
DIASTOLIC BLOOD PRESSURE: 90 MMHG | SYSTOLIC BLOOD PRESSURE: 160 MMHG | OXYGEN SATURATION: 98 % | TEMPERATURE: 97.9 F | HEART RATE: 68 BPM | RESPIRATION RATE: 18 BRPM

## 2021-08-27 DIAGNOSIS — M86.9 OSTEOMYELITIS OF RIGHT FEMUR, UNSPECIFIED TYPE (HCC): Primary | ICD-10-CM

## 2021-08-27 PROCEDURE — G0299 HHS/HOSPICE OF RN EA 15 MIN: HCPCS

## 2021-08-28 NOTE — HOME HEALTH
SOC following extended stay at [facility] post hospitalization for osteomyelitis to right femur. Pt continues to have open wound to right hip that has a moderate amount of tan colored drainage. Patient reports she has been leaving wound open to air. Wound inspected and measurements obtained. See wound care flowsheet. Wound cleansed with normal saline and patted dry. Wound packed with saline moistened gauze and covered with ABD pad and secured with tape. Call placed to Dr. Ohara's office to advise of wound drainage. Spoke to nurse who states they will fax wound care order. Proceeded with SOC assessment.

## 2021-08-30 ENCOUNTER — HOME CARE VISIT (OUTPATIENT)
Dept: HOME HEALTH SERVICES | Facility: CLINIC | Age: 66
End: 2021-08-30

## 2021-08-30 VITALS
TEMPERATURE: 95.5 F | DIASTOLIC BLOOD PRESSURE: 88 MMHG | SYSTOLIC BLOOD PRESSURE: 164 MMHG | RESPIRATION RATE: 18 BRPM | HEART RATE: 108 BPM | OXYGEN SATURATION: 96 %

## 2021-08-30 VITALS
OXYGEN SATURATION: 96 % | HEART RATE: 105 BPM | SYSTOLIC BLOOD PRESSURE: 164 MMHG | RESPIRATION RATE: 18 BRPM | DIASTOLIC BLOOD PRESSURE: 88 MMHG | TEMPERATURE: 95.5 F

## 2021-08-30 PROCEDURE — G0300 HHS/HOSPICE OF LPN EA 15 MIN: HCPCS

## 2021-08-30 PROCEDURE — G0151 HHCP-SERV OF PT,EA 15 MIN: HCPCS

## 2021-08-30 NOTE — HOME HEALTH
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 Pt report onset R hip pain 2-2021 with 2 hip sx since onset (per pt not a  [facility]  )   Pt reports cervical fusion 4-2021 - thoracic spine sx 5-2021 . Pt reports most recent  hospitalized 7-27 -21 with SNF placement with return home 8-24-21       PMHX :  CAD / MI / stents - R knee spacer - lumbar sx 2016 - L knee OA ( bone <> bone ) - RA - CPS   PLOF : household amb rw - community mobility prn A/AD   HOME ENVIRONMENT : lives with family   DME needs : na   Homebound status assessment : Pt presents with altered gait , balance , strength and endurance per dx

## 2021-08-31 ENCOUNTER — OFFICE VISIT (OUTPATIENT)
Dept: INTERNAL MEDICINE | Facility: CLINIC | Age: 66
End: 2021-08-31

## 2021-08-31 VITALS
HEART RATE: 78 BPM | WEIGHT: 179 LBS | SYSTOLIC BLOOD PRESSURE: 144 MMHG | DIASTOLIC BLOOD PRESSURE: 82 MMHG | BODY MASS INDEX: 29.82 KG/M2 | TEMPERATURE: 97.1 F | HEIGHT: 65 IN | OXYGEN SATURATION: 98 %

## 2021-08-31 DIAGNOSIS — M12.9 ARTHRITIS INVOLVING MULTIPLE SITES: ICD-10-CM

## 2021-08-31 DIAGNOSIS — Z09 HOSPITAL DISCHARGE FOLLOW-UP: Primary | ICD-10-CM

## 2021-08-31 DIAGNOSIS — D64.9 ANEMIA, UNSPECIFIED TYPE: Chronic | ICD-10-CM

## 2021-08-31 PROCEDURE — 99495 TRANSJ CARE MGMT MOD F2F 14D: CPT | Performed by: NURSE PRACTITIONER

## 2021-08-31 PROCEDURE — 1111F DSCHRG MED/CURRENT MED MERGE: CPT | Performed by: NURSE PRACTITIONER

## 2021-08-31 RX ORDER — TRAMADOL HYDROCHLORIDE 100 MG/1
100 TABLET, COATED ORAL 3 TIMES DAILY
COMMUNITY
End: 2022-04-26 | Stop reason: HOSPADM

## 2021-08-31 RX ORDER — HYDROCODONE BITARTRATE AND ACETAMINOPHEN 7.5; 325 MG/1; MG/1
1 TABLET ORAL EVERY 6 HOURS PRN
COMMUNITY
End: 2021-09-10

## 2021-08-31 NOTE — PROGRESS NOTES
Transitional Care Follow Up Visit  Subjective     Tawny MARQUES Kip is a 66 y.o. female who presents for a transitional care management visit.    Within 48 business hours after discharge our office contacted her via telephone to coordinate her care and needs.      I reviewed and discussed the details of that call along with the discharge summary, hospital problems, inpatient lab results, inpatient diagnostic studies, and consultation reports with Tawny.     Current outpatient and discharge medications have been reconciled for the patient.  Reviewed by: DANIELA Maradiaga      Date of TCM Phone Call 8/26/2021   [facility]   Date of Admission 7/21/2021   Date of Discharge 8/25/2021   Discharge Disposition -     Risk for Readmission (LACE) No data recorded    Ms. Veronica is a pleasant 66-year-old female who presents to the office today for a hospital follow-up from her rehab stay at [facility].  Patient was discharged 1 week ago and reports that she is doing well.  She does report some persistent fatigue and has a history of anemia.  She denies any acute bleeding from her wound on the right side of her leg.  She reports that her sister is helping her with dressing changes 4 out of the 7 days.  She denies any fever, chills, exudate from wound foul-smelling odor, redness, swelling, or streaking around wound site.  She just had her sister completed dressing change prior to her office visit today and unable to assess the wound myself.  And home health is coming in 3 times a week for physical therapy and wound care.  Patient is done with her IV antibiotics and her PICC line was removed last Tuesday.  She has continued oral antibiotic therapy at this time.  Dr. Best is monitoring her wound progress and adjusting her treatments.  She saw Dr. Best last week and has another follow-up with him towards the end of September.  Patient reports that her arthritis pain  has worsened since having to stop her therapies through Dr. Chaidez office.  Patient reports that he had recently giving her a steroid pack and today was her last day with some mild improvement.  However she reports her right hand she is having trouble gripping the fork in order to feed herself.  She is using Voltaren gel once a day with mild improvement for about 1 to 2 hours.        The following portions of the patient's history were reviewed and updated as appropriate: allergies, current medications, past family history, past medical history, past social history, past surgical history and problem list.    Review of Systems   Constitutional: Positive for fatigue. Negative for activity change, appetite change, fever and unexpected weight change.   HENT: Negative for congestion, ear discharge, ear pain, hearing loss, postnasal drip, rhinorrhea, sinus pressure, tinnitus, trouble swallowing and voice change.    Eyes: Negative for discharge and visual disturbance.   Respiratory: Negative for apnea, cough and shortness of breath.    Cardiovascular: Negative for chest pain, palpitations and leg swelling.   Gastrointestinal: Negative for abdominal pain, blood in stool, constipation and rectal pain.   Endocrine: Negative for cold intolerance, heat intolerance, polydipsia and polyphagia.   Genitourinary: Negative for decreased urine volume, difficulty urinating, frequency, hematuria and urgency.   Musculoskeletal: Positive for arthralgias, back pain, gait problem, joint swelling and myalgias. Negative for neck pain and neck stiffness.   Skin: Positive for wound. Negative for color change and rash.   Allergic/Immunologic: Negative for environmental allergies.   Neurological: Negative for dizziness, speech difficulty, weakness, numbness and headaches.   Hematological: Negative for adenopathy. Does not bruise/bleed easily.   Psychiatric/Behavioral: Negative for agitation, confusion, decreased concentration and sleep  disturbance. The patient is not nervous/anxious.        Objective   Physical Exam  Vitals and nursing note reviewed.   Constitutional:       Appearance: Normal appearance. She is well-developed and well-groomed.   HENT:      Head: Normocephalic and atraumatic.      Right Ear: Hearing and external ear normal.      Left Ear: Hearing and external ear normal.      Nose: Nose normal.      Mouth/Throat:      Lips: Pink.      Mouth: Mucous membranes are moist.   Eyes:      General: Lids are normal. Lids are everted, no foreign bodies appreciated. Vision grossly intact.      Extraocular Movements: Extraocular movements intact.      Conjunctiva/sclera: Conjunctivae normal.      Pupils: Pupils are equal, round, and reactive to light.   Neck:      Thyroid: No thyromegaly.      Vascular: Carotid bruit present.   Cardiovascular:      Rate and Rhythm: Normal rate and regular rhythm.      Pulses: Normal pulses.           Carotid pulses are on the left side with bruit.     Heart sounds: Normal heart sounds.   Pulmonary:      Effort: Pulmonary effort is normal. No tachypnea, bradypnea, prolonged expiration or respiratory distress.      Breath sounds: Normal breath sounds. No decreased breath sounds, wheezing, rhonchi or rales.   Abdominal:      General: Abdomen is protuberant. Bowel sounds are normal.      Palpations: Abdomen is soft.      Tenderness: There is no abdominal tenderness.   Musculoskeletal:      Right shoulder: Bony tenderness and crepitus present. Decreased range of motion. Decreased strength.      Left shoulder: Bony tenderness and crepitus present. Decreased range of motion. Decreased strength.      Right hand: Swelling, deformity and bony tenderness present. Decreased range of motion. Decreased strength of finger abduction, thumb/finger opposition and wrist extension.      Left hand: Swelling, deformity and bony tenderness present. Decreased range of motion. Decreased strength of finger abduction, thumb/finger  opposition and wrist extension.      Cervical back: Full passive range of motion without pain, normal range of motion and neck supple.      Right knee: Swelling present. Decreased range of motion. Tenderness present over the medial joint line and lateral joint line.      Left knee: Swelling and bony tenderness present. Decreased range of motion. Tenderness present over the medial joint line and lateral joint line.      Right lower leg: No edema.      Left lower leg: No edema.      Right ankle: Tenderness present over the lateral malleolus and medial malleolus. Decreased range of motion.      Left ankle: Tenderness present over the lateral malleolus and medial malleolus. Decreased range of motion.      Right foot: Decreased range of motion. Swelling, tenderness and bony tenderness present.      Left foot: Decreased range of motion. Swelling, tenderness and bony tenderness present.   Lymphadenopathy:      Head:      Right side of head: No submental, submandibular, tonsillar, preauricular, posterior auricular or occipital adenopathy.      Left side of head: No submental, submandibular, tonsillar, preauricular, posterior auricular or occipital adenopathy.      Cervical: No cervical adenopathy.   Skin:     General: Skin is warm and dry.      Capillary Refill: Capillary refill takes less than 2 seconds.      Findings: Wound present.          Neurological:      General: No focal deficit present.      Mental Status: She is alert and oriented to person, place, and time.      Deep Tendon Reflexes: Reflexes are normal and symmetric.   Psychiatric:         Attention and Perception: Attention normal.         Mood and Affect: Mood normal.         Speech: Speech normal.         Behavior: Behavior normal. Behavior is cooperative.         Thought Content: Thought content normal.         Assessment/Plan   Diagnoses and all orders for this visit:    1. Hospital discharge follow-up (Primary)  Comments:  discharged from [facility]  week and doing well. Home health coming TID for wound care and PT.    2. Anemia, unspecified type  -     CBC & Differential    3. Arthritis involving multiple sites  Comments:  worse in bilateral hands; Dr. Chaidez following; complete steroid pack today      Will check a CBC related to fatigue and history of anemia.  Will add labs accordingly to determine source of anemia.  Most likely would be related to acute infection and side effects of antibiotic therapy over a prolonged clear period of time.  However, discussed adding iron saturation, TIBC, ferritin, vitamin B12, folate if abnormal.  Would want to slowly supplement her iron to once a day for 2 weeks and increase to twice a day in 2 weeks and then up to 3 times a day the following 2 weeks if indicated need.  No acute bleeding noted on exam or through discussion with patient in office visit today.    Arthritis is in multiple sites and monitored and controlled by Dr. Chaidez.  Had to stop majority of her medications related to immunocompromising state.  Will resume these medications after she is completely healed from her wounds.  Advised her to continue discussing treatment options with Dr. Chaidez, however she could try moist heat and use Voltaren gel up to 3 times a day on hands and feet, would not advise using Voltaren gel on all arthritis sites.  However moist heat might help with hands and feet pain as well.  Advised her to ask physical therapy home health to give her some stretches and exercises to do to improve her  strength to help her in the meantime.  Continue oral Tylenol over-the-counter.    Patient is unsure if sister would be able to continue to do wound care twice a week.  If this is the case and will require continued daily wound care related to location patient is unable to do it herself will request home health with new order to complete dressing change once a day as indicated through wound care professionals up until her visit with   Cez[facility] in September.  We will have Dr. Ybarra place this order for me since I will be out of the office.  She will contact me with any questions or concerns.

## 2021-09-01 ENCOUNTER — HOME CARE VISIT (OUTPATIENT)
Dept: HOME HEALTH SERVICES | Facility: CLINIC | Age: 66
End: 2021-09-01

## 2021-09-01 ENCOUNTER — LAB (OUTPATIENT)
Dept: LAB | Facility: HOSPITAL | Age: 66
End: 2021-09-01

## 2021-09-01 VITALS
OXYGEN SATURATION: 96 % | SYSTOLIC BLOOD PRESSURE: 160 MMHG | TEMPERATURE: 98 F | DIASTOLIC BLOOD PRESSURE: 80 MMHG | HEART RATE: 64 BPM | RESPIRATION RATE: 18 BRPM

## 2021-09-01 DIAGNOSIS — S71.001D COMPLICATED OPEN WOUND OF RIGHT HIP, SUBSEQUENT ENCOUNTER: ICD-10-CM

## 2021-09-01 DIAGNOSIS — M83.2 ADULT OSTEOMALACIA DUE TO MALABSORPTION: ICD-10-CM

## 2021-09-01 DIAGNOSIS — S22.080A CLOSED WEDGE COMPRESSION FRACTURE OF T12 VERTEBRA, INITIAL ENCOUNTER (HCC): ICD-10-CM

## 2021-09-01 DIAGNOSIS — S71.001D COMPLICATED OPEN WOUND OF RIGHT HIP, SUBSEQUENT ENCOUNTER: Primary | ICD-10-CM

## 2021-09-01 LAB
ALBUMIN SERPL-MCNC: 3.3 G/DL (ref 3.5–5.2)
ALBUMIN/GLOB SERPL: 1 G/DL
ALP SERPL-CCNC: 111 U/L (ref 39–117)
ALT SERPL W P-5'-P-CCNC: 23 U/L (ref 1–33)
ANION GAP SERPL CALCULATED.3IONS-SCNC: 9 MMOL/L (ref 5–15)
AST SERPL-CCNC: 48 U/L (ref 1–32)
BASOPHILS # BLD AUTO: 0.03 10*3/MM3 (ref 0–0.2)
BASOPHILS NFR BLD AUTO: 0.5 % (ref 0–1.5)
BILIRUB SERPL-MCNC: 0.2 MG/DL (ref 0–1.2)
BUN SERPL-MCNC: 24 MG/DL (ref 8–23)
BUN/CREAT SERPL: 28.2 (ref 7–25)
CALCIUM SPEC-SCNC: 8.6 MG/DL (ref 8.6–10.5)
CHLORIDE SERPL-SCNC: 105 MMOL/L (ref 98–107)
CO2 SERPL-SCNC: 27 MMOL/L (ref 22–29)
CREAT SERPL-MCNC: 0.85 MG/DL (ref 0.57–1)
EOSINOPHIL # BLD AUTO: 0.01 10*3/MM3 (ref 0–0.4)
EOSINOPHIL NFR BLD AUTO: 0.2 % (ref 0.3–6.2)
ERYTHROCYTE [DISTWIDTH] IN BLOOD BY AUTOMATED COUNT: 14.4 % (ref 12.3–15.4)
GFR SERPL CREATININE-BSD FRML MDRD: 67 ML/MIN/1.73
GLOBULIN UR ELPH-MCNC: 3.4 GM/DL
GLUCOSE SERPL-MCNC: 130 MG/DL (ref 65–99)
HCT VFR BLD AUTO: 31.9 % (ref 34–46.6)
HGB BLD-MCNC: 9.5 G/DL (ref 12–15.9)
IMM GRANULOCYTES # BLD AUTO: 0.03 10*3/MM3 (ref 0–0.05)
IMM GRANULOCYTES NFR BLD AUTO: 0.5 % (ref 0–0.5)
LYMPHOCYTES # BLD AUTO: 1.67 10*3/MM3 (ref 0.7–3.1)
LYMPHOCYTES NFR BLD AUTO: 25.7 % (ref 19.6–45.3)
MCH RBC QN AUTO: 27.1 PG (ref 26.6–33)
MCHC RBC AUTO-ENTMCNC: 29.8 G/DL (ref 31.5–35.7)
MCV RBC AUTO: 90.9 FL (ref 79–97)
MONOCYTES # BLD AUTO: 0.9 10*3/MM3 (ref 0.1–0.9)
MONOCYTES NFR BLD AUTO: 13.8 % (ref 5–12)
NEUTROPHILS # BLD AUTO: 3.86 10*3/MM3 (ref 1.7–7)
NEUTROPHILS NFR BLD AUTO: 59.3 % (ref 42.7–76)
NRBC BLD AUTO-RTO: 0 /100 WBC (ref 0–0.2)
PLATELET # BLD AUTO: 232 10*3/MM3 (ref 140–450)
POTASSIUM SERPL-SCNC: 4.4 MMOL/L (ref 3.5–5.2)
PROT SERPL-MCNC: 6.7 G/DL (ref 6–8.5)
RBC # BLD AUTO: 3.51 10*6/MM3 (ref 3.77–5.28)
SODIUM SERPL-SCNC: 141 MMOL/L (ref 136–145)
WBC # BLD AUTO: 6.5 10*3/MM3 (ref 3.4–10.8)

## 2021-09-01 PROCEDURE — 83970 ASSAY OF PARATHORMONE: CPT

## 2021-09-01 PROCEDURE — 82306 VITAMIN D 25 HYDROXY: CPT

## 2021-09-01 PROCEDURE — 36415 COLL VENOUS BLD VENIPUNCTURE: CPT

## 2021-09-01 PROCEDURE — 80053 COMPREHEN METABOLIC PANEL: CPT

## 2021-09-01 PROCEDURE — 84134 ASSAY OF PREALBUMIN: CPT

## 2021-09-01 PROCEDURE — G0300 HHS/HOSPICE OF LPN EA 15 MIN: HCPCS

## 2021-09-02 ENCOUNTER — HOME CARE VISIT (OUTPATIENT)
Dept: HOME HEALTH SERVICES | Facility: CLINIC | Age: 66
End: 2021-09-02

## 2021-09-02 VITALS
SYSTOLIC BLOOD PRESSURE: 168 MMHG | DIASTOLIC BLOOD PRESSURE: 78 MMHG | HEART RATE: 67 BPM | OXYGEN SATURATION: 96 % | RESPIRATION RATE: 18 BRPM | TEMPERATURE: 98.5 F

## 2021-09-02 LAB
25(OH)D3 SERPL-MCNC: 40.7 NG/ML (ref 30–100)
PREALB SERPL-MCNC: 21.8 MG/DL (ref 20–40)
PTH-INTACT SERPL-MCNC: 93.9 PG/ML (ref 15–65)

## 2021-09-02 PROCEDURE — G0152 HHCP-SERV OF OT,EA 15 MIN: HCPCS

## 2021-09-02 PROCEDURE — G0179 MD RECERTIFICATION HHA PT: HCPCS | Performed by: INTERNAL MEDICINE

## 2021-09-02 NOTE — HOME HEALTH
"OT EVALUATION  Primary Dx: Pt is a 66 y.o. female with Infection/inflammation and osteomyelitis to right femur. She had R hip irrigation and bone biopsy, followed by antibiotic regimen for infection. She recently returned home following extended stay at SNF post hospitalization.  Secondary Dx: RA, OA, R knee spacer, CAD / MI / stents/pacemaker, R knee spacer, lumbar surgery 2016,  L knee OA, chronic pain syndrome, Non-healing surgical wound, right hip, osteomyelitis of lumbar spine, osteomyelitis of right femur, Rupture of gluteus minimus tendon.   Surgical procedures: R hip irrigation and bone biopsy on 7/8/21.  Precautions: Fall risk  Equipment: Rollator, shower chair, hand held shower head, grab bars at entrance to shower, grab bar next to 17\" toilet, dressing stick, reacher, sock aid, elastic shoelaces  PLOF: The patient reports she was I with all self care, used SPC for mobility, drove, did her own laundry and cooking, lived alone.  Patient Goals for therapy: reports onset of R hip pain 2/21 with 2 hip surgeries since onset. States she did not have a MC. She had cervical fusion 4/21; thoracic spine surgery 5/21. She was most recently hospitalized 7/27/21 with SNF placement, returning home 8/24/21.   Secondary dx: Osteoporosis, asthma, B knee arthritis, R knee spacer, lumbar surgery 2016; cardiac pacemaker syndrome, chronic pain, chronic DVTs, Charcot syndrome L foot, CAD, MI, stents. HTN, RA  Surgical procedure:  Subjective: The patient is up and dressed; moving around in the home on her rollator.  Previous OT: The patient has had OT services in the past, in SNF.  Fall prevention education: Use rollator for all mobility  Diabetic teaching: The patient is not diabetic  Fall reported: None  Medication Changes: None  Medication teaching: Pt has no questions regarding medications  Insurance Changes: None  Reason for referral:   Medical necessity: Skilled OT is reasonable and medically necessary to increase safety " and independence with ADL/iADL tasks and return the patient to PLOF.  Next MD appointment: Dr Chaidez 9/3/21.  Rehab potential: Good, due to high PLOF; motivated to achieve PLOF; good family support.  D/C plan: To self/family, when goals met or highest functional level achieved.  Frequency: 1 wk/3, beginning week of 9/6/21  Plan for next visit: DME/AE for self feeding, buttoning, FM tasks, initiate UE HEP for increased AROM of B shoulders, wrists, elbows and fingers.

## 2021-09-03 ENCOUNTER — HOME CARE VISIT (OUTPATIENT)
Dept: HOME HEALTH SERVICES | Facility: CLINIC | Age: 66
End: 2021-09-03

## 2021-09-03 ENCOUNTER — TELEPHONE (OUTPATIENT)
Dept: INTERNAL MEDICINE | Facility: CLINIC | Age: 66
End: 2021-09-03

## 2021-09-03 VITALS
SYSTOLIC BLOOD PRESSURE: 130 MMHG | DIASTOLIC BLOOD PRESSURE: 70 MMHG | RESPIRATION RATE: 18 BRPM | OXYGEN SATURATION: 96 % | HEART RATE: 73 BPM | TEMPERATURE: 97.4 F

## 2021-09-03 DIAGNOSIS — D69.6 THROMBOCYTOPENIA (HCC): Primary | ICD-10-CM

## 2021-09-03 DIAGNOSIS — I10 ESSENTIAL HYPERTENSION: ICD-10-CM

## 2021-09-03 PROCEDURE — G0157 HHC PT ASSISTANT EA 15: HCPCS

## 2021-09-03 PROCEDURE — G0300 HHS/HOSPICE OF LPN EA 15 MIN: HCPCS

## 2021-09-03 NOTE — TELEPHONE ENCOUNTER
----- Message from DANIELA Carvajal sent at 9/2/2021  7:56 PM CDT -----  CBC low but showing mild improvement.  Needs to have Iron & TIBC, Ferritin, Vitamin B12 and Folate checked.

## 2021-09-06 ENCOUNTER — HOME CARE VISIT (OUTPATIENT)
Dept: HOME HEALTH SERVICES | Facility: CLINIC | Age: 66
End: 2021-09-06

## 2021-09-06 VITALS
TEMPERATURE: 97.2 F | SYSTOLIC BLOOD PRESSURE: 140 MMHG | DIASTOLIC BLOOD PRESSURE: 76 MMHG | OXYGEN SATURATION: 97 % | HEART RATE: 63 BPM | RESPIRATION RATE: 18 BRPM

## 2021-09-06 VITALS
SYSTOLIC BLOOD PRESSURE: 92 MMHG | DIASTOLIC BLOOD PRESSURE: 48 MMHG | HEART RATE: 74 BPM | OXYGEN SATURATION: 98 % | TEMPERATURE: 98.4 F | RESPIRATION RATE: 16 BRPM

## 2021-09-06 PROCEDURE — G0299 HHS/HOSPICE OF RN EA 15 MIN: HCPCS

## 2021-09-06 NOTE — HOME HEALTH
No recent falls, no insurance changes, and no recent medication changes. There was no need to contact the MD. Pt/cg had no further questions or concerns regarding the pt's medications or plan of care. A&O X 4. Pt c/o chronic pain. VSS. No S&S of infection in wound. Wound measured and pictures taken. Wound cleansed with wound cleanser and gauze, wound packed with damp gauze, dry gauze applied over wound, skin prep applied to periwound, and abd pad applied and secured with tape.

## 2021-09-08 ENCOUNTER — HOME CARE VISIT (OUTPATIENT)
Dept: HOME HEALTH SERVICES | Facility: CLINIC | Age: 66
End: 2021-09-08

## 2021-09-08 VITALS
RESPIRATION RATE: 18 BRPM | TEMPERATURE: 97.8 F | HEART RATE: 66 BPM | OXYGEN SATURATION: 95 % | SYSTOLIC BLOOD PRESSURE: 110 MMHG | DIASTOLIC BLOOD PRESSURE: 50 MMHG

## 2021-09-08 PROCEDURE — G0157 HHC PT ASSISTANT EA 15: HCPCS

## 2021-09-09 ENCOUNTER — HOME CARE VISIT (OUTPATIENT)
Dept: HOME HEALTH SERVICES | Facility: CLINIC | Age: 66
End: 2021-09-09

## 2021-09-09 ENCOUNTER — TRANSCRIBE ORDERS (OUTPATIENT)
Dept: LAB | Facility: HOSPITAL | Age: 66
End: 2021-09-09

## 2021-09-09 VITALS
DIASTOLIC BLOOD PRESSURE: 62 MMHG | OXYGEN SATURATION: 96 % | RESPIRATION RATE: 18 BRPM | HEART RATE: 72 BPM | TEMPERATURE: 100.3 F | SYSTOLIC BLOOD PRESSURE: 118 MMHG

## 2021-09-09 DIAGNOSIS — M48.02 STENOSIS OF CERVICAL SPINE WITH MYELOPATHY (HCC): ICD-10-CM

## 2021-09-09 DIAGNOSIS — Z11.59 SCREENING FOR VIRAL DISEASE: Primary | ICD-10-CM

## 2021-09-09 DIAGNOSIS — G99.2 STENOSIS OF CERVICAL SPINE WITH MYELOPATHY (HCC): ICD-10-CM

## 2021-09-09 PROCEDURE — G0152 HHCP-SERV OF OT,EA 15 MIN: HCPCS

## 2021-09-09 RX ORDER — GABAPENTIN 100 MG/1
CAPSULE ORAL
Qty: 60 CAPSULE | OUTPATIENT
Start: 2021-09-09

## 2021-09-09 NOTE — HOME HEALTH
Patient had a surgeon appt today. He is assessing the wound for possible surgery. Patient preferred HH visit be missed since surgeon and staff will complete wound care at appt.

## 2021-09-09 NOTE — HOME HEALTH
OT ROUTINE VISIT  Subjective: The patient is getting dressed on OTR arrival. Temp is 100.3 due to patient having taken a hot shower. Rechecked temp at end of visit; 98/3.  Fall prevention education: Remove throw rugs, use rollator for all mobility, have adequate lighting in the home 24/7, use appropriate footwear for mobility/transfers.  Fall reported: None  Medication Changes: Pt has no questions regarding medications.  Medication teaching: N/A   Insurance Changes: N/A   Precautions: fall risk  Medical necessity: Skilled OT is reasonable and medically necessary to increase safety and independence with ADL and to increase UE function as needed for ADL performance and patient/caregiver education.   Next MD appointment: The patient states she is having surgery to close wound on R hip on 9/14/21, and will likely be hospitalized for the rest of the week.  D/C plan: To self/family, when goals met or highest functional level achieved.   Frequency: Continue skilled home health OT for 1 wk/2.  [facility]/Medicare non coverage form signed: No   Plan for next visit:

## 2021-09-10 ENCOUNTER — HOME CARE VISIT (OUTPATIENT)
Dept: HOME HEALTH SERVICES | Facility: CLINIC | Age: 66
End: 2021-09-10

## 2021-09-10 ENCOUNTER — PRE-ADMISSION TESTING (OUTPATIENT)
Dept: PREADMISSION TESTING | Facility: HOSPITAL | Age: 66
End: 2021-09-10

## 2021-09-10 VITALS
RESPIRATION RATE: 18 BRPM | DIASTOLIC BLOOD PRESSURE: 60 MMHG | TEMPERATURE: 97.6 F | SYSTOLIC BLOOD PRESSURE: 120 MMHG | OXYGEN SATURATION: 96 % | HEART RATE: 69 BPM

## 2021-09-10 VITALS
TEMPERATURE: 97.6 F | HEART RATE: 69 BPM | SYSTOLIC BLOOD PRESSURE: 120 MMHG | OXYGEN SATURATION: 96 % | RESPIRATION RATE: 18 BRPM | DIASTOLIC BLOOD PRESSURE: 60 MMHG

## 2021-09-10 VITALS
OXYGEN SATURATION: 97 % | HEIGHT: 63 IN | BODY MASS INDEX: 32.42 KG/M2 | SYSTOLIC BLOOD PRESSURE: 123 MMHG | DIASTOLIC BLOOD PRESSURE: 49 MMHG | HEART RATE: 72 BPM | WEIGHT: 182.98 LBS | RESPIRATION RATE: 18 BRPM

## 2021-09-10 LAB
BASOPHILS # BLD AUTO: 0.04 10*3/MM3 (ref 0–0.2)
BASOPHILS NFR BLD AUTO: 0.8 % (ref 0–1.5)
DEPRECATED RDW RBC AUTO: 50.2 FL (ref 37–54)
EOSINOPHIL # BLD AUTO: 0.24 10*3/MM3 (ref 0–0.4)
EOSINOPHIL NFR BLD AUTO: 4.6 % (ref 0.3–6.2)
ERYTHROCYTE [DISTWIDTH] IN BLOOD BY AUTOMATED COUNT: 15.8 % (ref 12.3–15.4)
HCT VFR BLD AUTO: 26.2 % (ref 34–46.6)
HGB BLD-MCNC: 8.2 G/DL (ref 12–15.9)
IMM GRANULOCYTES # BLD AUTO: 0.03 10*3/MM3 (ref 0–0.05)
IMM GRANULOCYTES NFR BLD AUTO: 0.6 % (ref 0–0.5)
LYMPHOCYTES # BLD AUTO: 0.93 10*3/MM3 (ref 0.7–3.1)
LYMPHOCYTES NFR BLD AUTO: 17.6 % (ref 19.6–45.3)
MCH RBC QN AUTO: 27.2 PG (ref 26.6–33)
MCHC RBC AUTO-ENTMCNC: 31.3 G/DL (ref 31.5–35.7)
MCV RBC AUTO: 87 FL (ref 79–97)
MONOCYTES # BLD AUTO: 0.75 10*3/MM3 (ref 0.1–0.9)
MONOCYTES NFR BLD AUTO: 14.2 % (ref 5–12)
NEUTROPHILS NFR BLD AUTO: 3.28 10*3/MM3 (ref 1.7–7)
NEUTROPHILS NFR BLD AUTO: 62.2 % (ref 42.7–76)
NRBC BLD AUTO-RTO: 0 /100 WBC (ref 0–0.2)
PLATELET # BLD AUTO: 198 10*3/MM3 (ref 140–450)
PMV BLD AUTO: 10.5 FL (ref 6–12)
RBC # BLD AUTO: 3.01 10*6/MM3 (ref 3.77–5.28)
WBC # BLD AUTO: 5.27 10*3/MM3 (ref 3.4–10.8)

## 2021-09-10 PROCEDURE — 36415 COLL VENOUS BLD VENIPUNCTURE: CPT

## 2021-09-10 PROCEDURE — G0300 HHS/HOSPICE OF LPN EA 15 MIN: HCPCS

## 2021-09-10 PROCEDURE — 85025 COMPLETE CBC W/AUTO DIFF WBC: CPT

## 2021-09-10 PROCEDURE — G0157 HHC PT ASSISTANT EA 15: HCPCS

## 2021-09-10 NOTE — DISCHARGE INSTRUCTIONS
DAY OF SURGERY INSTRUCTIONS        YOUR SURGEON: ***    PROCEDURE: ***    DATE OF SURGERY: ***    ARRIVAL TIME: AS DIRECTED BY OFFICE    YOU MAY TAKE THE FOLLOWING MEDICATION(S) THE MORNING OF SURGERY WITH A SIP OF WATER: ***    ALL OTHER HOME MEDICATIONS CHECK WITH YOUR DOCTOR    DO NOT TAKE ANY ERECTILE DYSFUNCTION MEDICATIONS (EX:  CIALIS, VIAGRA) 24 HOURS PRIOR TO SURGERY              MANAGING PAIN AFTER SURGERY    We know you are probably wondering what your pain will be like after surgery.  Following surgery it is unrealistic to expect you will not have pain.   Pain is how our bodies let us know that something is wrong or cautions us to be careful.  That said, our goal is to make your pain tolerable.    Methods we may use to treat your pain include (oral or IV medications, PCAs, epidurals, nerve blocks, etc.)   While some procedures require IV pain medications for a short time after surgery, transitioning to pain medications by mouth allows for better management of pain.   Your nurse will encourage you to take oral pain medications whenever possible.  IV medications work almost immediately, but only last a short while.  Taking medications by mouth allows for a more constant level of medication in your blood stream for a longer period of time.      Once your pain is out of control it is harder to get back under control.  It is important you are aware when your next dose of pain medication is due.  If you are admitted, your nurse may write the time of your next dose on the white board in your room to help you remember.      We are interested in your pain and encourage you to inform us about aggravating factors during your visit.   Many times a simple repositioning every few hours can make a big difference.    If your physician says it is okay, do not let your pain prevent you from getting out of bed. Be sure to call your nurse for assistance prior to getting up so you do not fall.      Before surgery, please  decide your tolerable pain goal.  These faces help describe the pain ratings we use on a 0-10 scale.   Be prepared to tell us your goal and whether or not you take pain or anxiety medications at home.      BEFORE YOU COME TO THE HOSPITAL  (Pre-op instructions)  • Do not eat, drink, smoke or chew gum after midnight the night before surgery.  This also includes no mints.  • Morning of surgery take only the medicines you have been instructed with a sip of water unless otherwise instructed  by your physician.  • Do not shave, wear makeup or dark nail polish.  • Remove all jewelry including rings.  • Leave anything you consider valuable at home.  • Leave your suitcase in the car until after your surgery.  • Bring the following with you if applicable:  o Picture ID and insurance, Medicare or Medicaid cards  o Co-pay/deductible required by insurance (cash, check, credit card)  o Copy of advance directive, living will or power-of- documents if not brought to PAT  o CPAP or BIPAP mask and tubing  o Relaxation aids ( book, magazine), etc.  o Hearing aids                                 ON THE DAY OF SURGERY  · On the day of surgery check in at registration located at the main entrance of the hospital.   ? You will be registered and given a beeper with instructions where to wait in the main lobby.  ? When your beeper lights up and vibrates a member of the Outpatient Surgery staff will meet you at the double doors under the stair steps and escort you to your preoperative room.   · You may have cloth compression devices placed on your legs. These help to prevent blood clots and reduce swelling in your legs.  · An IV may be inserted into one of your veins.  · In the operating room, you may be given one or more of the following:  ? A medicine to help you relax (sedative).  ? A medicine to numb the area (local anesthetic).  ? A medicine to make you fall asleep (general anesthetic).  ? A medicine that is injected into an  "area of your body to numb everything below the injection site (regional anesthetic).  · Your surgical site will be marked or identified.  · You may be given an antibiotic through your IV to help prevent infection.  Contact a health care provider if you:  · Develop a fever of more than 100.4°F (38°C) or other feelings of illness during the 48 hours before your surgery.  · Have symptoms that get worse.  Have questions or concerns about your surgery    General Anesthesia/Surgery, Adult  General anesthesia is the use of medicines to make a person \"go to sleep\" (unconscious) for a medical procedure. General anesthesia must be used for certain procedures, and is often recommended for procedures that:  · Last a long time.  · Require you to be still or in an unusual position.  · Are major and can cause blood loss.  The medicines used for general anesthesia are called general anesthetics. As well as making you unconscious for a certain amount of time, these medicines:  · Prevent pain.  · Control your blood pressure.  · Relax your muscles.  Tell a health care provider about:  · Any allergies you have.  · All medicines you are taking, including vitamins, herbs, eye drops, creams, and over-the-counter medicines.  · Any problems you or family members have had with anesthetic medicines.  · Types of anesthetics you have had in the past.  · Any blood disorders you have.  · Any surgeries you have had.  · Any medical conditions you have.  · Any recent upper respiratory, chest, or ear infections.  · Any history of:  ? Heart or lung conditions, such as heart failure, sleep apnea, asthma, or chronic obstructive pulmonary disease (COPD).  ?  service.  ? Depression or anxiety.  · Any tobacco or drug use, including marijuana or alcohol use.  · Whether you are pregnant or may be pregnant.  What are the risks?  Generally, this is a safe procedure. However, problems may occur, including:  · Allergic reaction.  · Lung and heart " problems.  · Inhaling food or liquid from the stomach into the lungs (aspiration).  · Nerve injury.  · Air in the bloodstream, which can lead to stroke.  · Extreme agitation or confusion (delirium) when you wake up from the anesthetic.  · Waking up during your procedure and being unable to move. This is rare.  These problems are more likely to develop if you are having a major surgery or if you have an advanced or serious medical condition. You can prevent some of these complications by answering all of your health care provider's questions thoroughly and by following all instructions before your procedure.  General anesthesia can cause side effects, including:  · Nausea or vomiting.  · A sore throat from the breathing tube.  · Hoarseness.  · Wheezing or coughing.  · Shaking chills.  · Tiredness.  · Body aches.  · Anxiety.  · Sleepiness or drowsiness.  · Confusion or agitation.  RISKS AND COMPLICATIONS OF SURGERY  Your health care provider will discuss possible risks and complications with you before surgery. Common risks and complications include:    · Problems due to the use of anesthetics.  · Blood loss and replacement (does not apply to minor surgical procedures).  · Temporary increase in pain due to surgery.  · Uncorrected pain or problems that the surgery was meant to correct.  · Infection.  · New damage.    What happens before the procedure?    Medicines  Ask your health care provider about:  · Changing or stopping your regular medicines. This is especially important if you are taking diabetes medicines or blood thinners.  · Taking medicines such as aspirin and ibuprofen. These medicines can thin your blood. Do not take these medicines unless your health care provider tells you to take them.  · Taking over-the-counter medicines, vitamins, herbs, and supplements. Do not take these during the week before your procedure unless your health care provider approves them.  General instructions  · Starting 3-6 weeks  before the procedure, do not use any products that contain nicotine or tobacco, such as cigarettes and e-cigarettes. If you need help quitting, ask your health care provider.  · If you brush your teeth on the morning of the procedure, make sure to spit out all of the toothpaste.  · Tell your health care provider if you become ill or develop a cold, cough, or fever.  · If instructed by your health care provider, bring your sleep apnea device with you on the day of your surgery (if applicable).  · Ask your health care provider if you will be going home the same day, the following day, or after a longer hospital stay.  ? Plan to have someone take you home from the hospital or clinic.  ? Plan to have a responsible adult care for you for at least 24 hours after you leave the hospital or clinic. This is important.  What happens during the procedure?  · You will be given anesthetics through both of the following:  ? A mask placed over your nose and mouth.  ? An IV in one of your veins.  · You may receive a medicine to help you relax (sedative).  · After you are unconscious, a breathing tube may be inserted down your throat to help you breathe. This will be removed before you wake up.  · An anesthesia specialist will stay with you throughout your procedure. He or she will:  ? Keep you comfortable and safe by continuing to give you medicines and adjusting the amount of medicine that you get.  ? Monitor your blood pressure, pulse, and oxygen levels to make sure that the anesthetics do not cause any problems.  The procedure may vary among health care providers and hospitals.  What happens after the procedure?  · Your blood pressure, temperature, heart rate, breathing rate, and blood oxygen level will be monitored until the medicines you were given have worn off.  · You will wake up in a recovery area. You may wake up slowly.  · If you feel anxious or agitated, you may be given medicine to help you calm down.  · If you will be  going home the same day, your health care provider may check to make sure you can walk, drink, and urinate.  · Your health care provider will treat any pain or side effects you have before you go home.  · Do not drive for 24 hours if you were given a sedative.  Summary  · General anesthesia is used to keep you still and prevent pain during a procedure.  · It is important to tell your healthcare provider about your medical history and any surgeries you have had, and previous experience with anesthesia.  · Follow your healthcare provider’s instructions about when to stop eating, drinking, or taking certain medicines before your procedure.  · Plan to have someone take you home from the hospital or clinic.  This information is not intended to replace advice given to you by your health care provider. Make sure you discuss any questions you have with your health care provider.  Document Released: 03/26/2009 Document Revised: 08/03/2018 Document Reviewed: 08/03/2018  [employer] Interactive Patient Education © 2019 [employer]      Fall Prevention in Hospitals, Adult  As a hospital patient, your condition and the treatments you receive can increase your risk for falls. Some additional risk factors for falls in a hospital include:  · Being in an unfamiliar environment.  · Being on bed rest.  · Your surgery.  · Taking certain medicines.  · Your tubing requirements, such as intravenous (IV) therapy or catheters.  It is important that you learn how to decrease fall risks while at the hospital. Below are important tips that can help prevent falls.  SAFETY TIPS FOR PREVENTING FALLS  Talk about your risk of falling.  · Ask your health care provider why you are at risk for falling. Is it your medicine, illness, tubing placement, or something else?  · Make a plan with your health care provider to keep you safe from falls.  · Ask your health care provider or pharmacist about side effects of your medicines. Some medicines can make you  dizzy or affect your coordination.  Ask for help.  · Ask for help before getting out of bed. You may need to press your call button.  · Ask for assistance in getting safely to the toilet.  · Ask for a walker or cane to be put at your bedside. Ask that most of the side rails on your bed be placed up before your health care provider leaves the room.  · Ask family or friends to sit with you.  · Ask for things that are out of your reach, such as your glasses, hearing aids, telephone, bedside table, or call button.  Follow these tips to avoid falling:  · Stay lying or seated, rather than standing, while waiting for help.  · Wear rubber-soled slippers or shoes whenever you walk in the hospital.  · Avoid quick, sudden movements.  ¨ Change positions slowly.  ¨ Sit on the side of your bed before standing.  ¨ Stand up slowly and wait before you start to walk.  · Let your health care provider know if there is a spill on the floor.  · Pay careful attention to the medical equipment, electrical cords, and tubes around you.  · When you need help, use your call button by your bed or in the bathroom. Wait for one of your health care providers to help you.  · If you feel dizzy or unsure of your footing, return to bed and wait for assistance.  · Avoid being distracted by the TV, telephone, or another person in your room.  · Do not lean or support yourself on rolling objects, such as IV poles or bedside tables.     This information is not intended to replace advice given to you by your health care provider. Make sure you discuss any questions you have with your health care provider.     Document Released: 12/15/2001 Document Revised: 01/08/2016 Document Reviewed: 08/25/2013  [employer] Interactive Patient Education ©2016 [employer]            PATIENT/FAMILY/RESPONSIBLE PARTY VERBALIZES UNDERSTANDING OF ABOVE EDUCATION.  COPY OF PAIN SCALE GIVEN AND REVIEWED WITH VERBALIZED UNDERSTANDING.

## 2021-09-10 NOTE — CASE COMMUNICATION
Patient is scheduled for surgery at [facility] 9/14/21 for wound debridement and plastic surgery to her right hip.

## 2021-09-11 ENCOUNTER — LAB (OUTPATIENT)
Dept: LAB | Facility: HOSPITAL | Age: 66
End: 2021-09-11

## 2021-09-11 PROCEDURE — U0004 COV-19 TEST NON-CDC HGH THRU: HCPCS | Performed by: SURGERY

## 2021-09-11 PROCEDURE — C9803 HOPD COVID-19 SPEC COLLECT: HCPCS | Performed by: SURGERY

## 2021-09-11 PROCEDURE — U0005 INFEC AGEN DETEC AMPLI PROBE: HCPCS | Performed by: SURGERY

## 2021-09-12 LAB — SARS-COV-2 ORF1AB RESP QL NAA+PROBE: NOT DETECTED

## 2021-09-13 ENCOUNTER — HOME CARE VISIT (OUTPATIENT)
Dept: HOME HEALTH SERVICES | Facility: CLINIC | Age: 66
End: 2021-09-13

## 2021-09-13 ENCOUNTER — TELEPHONE (OUTPATIENT)
Dept: INTERNAL MEDICINE | Facility: CLINIC | Age: 66
End: 2021-09-13

## 2021-09-13 ENCOUNTER — LAB (OUTPATIENT)
Dept: LAB | Facility: HOSPITAL | Age: 66
End: 2021-09-13

## 2021-09-13 VITALS
TEMPERATURE: 98.2 F | SYSTOLIC BLOOD PRESSURE: 138 MMHG | DIASTOLIC BLOOD PRESSURE: 70 MMHG | OXYGEN SATURATION: 98 % | HEART RATE: 82 BPM | RESPIRATION RATE: 18 BRPM

## 2021-09-13 VITALS
TEMPERATURE: 98.3 F | DIASTOLIC BLOOD PRESSURE: 70 MMHG | HEART RATE: 69 BPM | OXYGEN SATURATION: 96 % | SYSTOLIC BLOOD PRESSURE: 136 MMHG | RESPIRATION RATE: 18 BRPM

## 2021-09-13 VITALS
DIASTOLIC BLOOD PRESSURE: 64 MMHG | TEMPERATURE: 97.1 F | RESPIRATION RATE: 18 BRPM | OXYGEN SATURATION: 96 % | HEART RATE: 74 BPM | SYSTOLIC BLOOD PRESSURE: 138 MMHG

## 2021-09-13 DIAGNOSIS — Z01.818 PREOP EXAMINATION: ICD-10-CM

## 2021-09-13 DIAGNOSIS — Z01.818 PREOP EXAMINATION: Primary | ICD-10-CM

## 2021-09-13 LAB
ABO GROUP BLD: NORMAL
BLD GP AB SCN SERPL QL: NEGATIVE
RH BLD: POSITIVE
T&S EXPIRATION DATE: NORMAL

## 2021-09-13 PROCEDURE — 86900 BLOOD TYPING SEROLOGIC ABO: CPT | Performed by: SURGERY

## 2021-09-13 PROCEDURE — 86900 BLOOD TYPING SEROLOGIC ABO: CPT

## 2021-09-13 PROCEDURE — G0300 HHS/HOSPICE OF LPN EA 15 MIN: HCPCS

## 2021-09-13 PROCEDURE — 86923 COMPATIBILITY TEST ELECTRIC: CPT

## 2021-09-13 PROCEDURE — G0152 HHCP-SERV OF OT,EA 15 MIN: HCPCS

## 2021-09-13 PROCEDURE — G0157 HHC PT ASSISTANT EA 15: HCPCS

## 2021-09-13 PROCEDURE — 86850 RBC ANTIBODY SCREEN: CPT | Performed by: SURGERY

## 2021-09-13 PROCEDURE — 86901 BLOOD TYPING SEROLOGIC RH(D): CPT

## 2021-09-13 PROCEDURE — 86901 BLOOD TYPING SEROLOGIC RH(D): CPT | Performed by: SURGERY

## 2021-09-13 NOTE — TELEPHONE ENCOUNTER
----- Message from Davon Urrutia MD sent at 9/10/2021  8:04 AM CDT -----  Suggest taking otc b12 daily as well as feso4 daily

## 2021-09-13 NOTE — HOME HEALTH
OT ROUTINE VISIT  Subjective: The patient states she is doing well, but is having  Fall prevention education: Use rollator for all mobility.  Fall reported: None  Medication Changes: N/A  Medication teaching: N/A   Insurance Changes: N/A   Precautions: fall risk  Medical necessity: Skilled OT is reasonable and medically necessary to increase safety and independence with ADL and to increase UE function as needed for ADL performance and patient/caregiver education.   Next MD appointment: 9/14/21  D/C plan: Pt being discharged from home health OT this date.   Frequency: Discharge from skilled home health OT.    [facility]/Medicare non coverage form signed: No  Plan for next visit: The patient is being discharged this date, as all OT goals met and she is going to hospital tomorrow for plastic surgery to close wound on L hip.

## 2021-09-14 ENCOUNTER — HOSPITAL ENCOUNTER (INPATIENT)
Facility: HOSPITAL | Age: 66
LOS: 2 days | Discharge: HOME HEALTH | End: 2021-09-16
Attending: SURGERY | Admitting: SURGERY

## 2021-09-14 ENCOUNTER — ANESTHESIA (OUTPATIENT)
Dept: PERIOP | Facility: HOSPITAL | Age: 66
End: 2021-09-14

## 2021-09-14 ENCOUNTER — ANESTHESIA EVENT (OUTPATIENT)
Dept: PERIOP | Facility: HOSPITAL | Age: 66
End: 2021-09-14

## 2021-09-14 ENCOUNTER — HOME CARE VISIT (OUTPATIENT)
Dept: HOME HEALTH SERVICES | Facility: HOME HEALTHCARE | Age: 66
End: 2021-09-14

## 2021-09-14 DIAGNOSIS — S71.009A: ICD-10-CM

## 2021-09-14 DIAGNOSIS — T81.89XA NON-HEALING SURGICAL WOUND, INITIAL ENCOUNTER: Primary | ICD-10-CM

## 2021-09-14 PROBLEM — H65.06 RECURRENT ACUTE SEROUS OTITIS MEDIA OF BOTH EARS: Status: RESOLVED | Noted: 2020-12-23 | Resolved: 2021-09-14

## 2021-09-14 PROBLEM — E55.9 VITAMIN D DEFICIENCY: Status: RESOLVED | Noted: 2020-12-23 | Resolved: 2021-09-14

## 2021-09-14 PROBLEM — R78.81 STAPHYLOCOCCUS AUREUS BACTEREMIA: Status: RESOLVED | Noted: 2021-02-09 | Resolved: 2021-09-14

## 2021-09-14 PROBLEM — S06.0X0A CONCUSSION WITH NO LOSS OF CONSCIOUSNESS: Status: RESOLVED | Noted: 2021-06-22 | Resolved: 2021-09-14

## 2021-09-14 PROBLEM — K68.12 ILIOPSOAS ABSCESS (HCC): Status: RESOLVED | Noted: 2021-03-22 | Resolved: 2021-09-14

## 2021-09-14 PROBLEM — S92.309A: Status: RESOLVED | Noted: 2019-05-10 | Resolved: 2021-09-14

## 2021-09-14 PROBLEM — R55 VASOVAGAL SYNCOPE: Status: RESOLVED | Noted: 2020-08-12 | Resolved: 2021-09-14

## 2021-09-14 PROBLEM — B95.61 STAPHYLOCOCCUS AUREUS BACTEREMIA: Status: RESOLVED | Noted: 2021-02-09 | Resolved: 2021-09-14

## 2021-09-14 PROBLEM — S76.019A: Status: RESOLVED | Noted: 2021-01-14 | Resolved: 2021-09-14

## 2021-09-14 PROBLEM — S71.001A OPEN WOUND OF RIGHT HIP: Status: ACTIVE | Noted: 2021-09-14

## 2021-09-14 PROBLEM — S22.080A CLOSED WEDGE COMPRESSION FRACTURE OF T12 VERTEBRA: Status: RESOLVED | Noted: 2021-05-26 | Resolved: 2021-09-14

## 2021-09-14 PROCEDURE — 0JX90ZC TRANSFER BUTTOCK SUBCUTANEOUS TISSUE AND FASCIA WITH SKIN, SUBCUTANEOUS TISSUE AND FASCIA, OPEN APPROACH: ICD-10-PCS | Performed by: SURGERY

## 2021-09-14 PROCEDURE — 87102 FUNGUS ISOLATION CULTURE: CPT | Performed by: SURGERY

## 2021-09-14 PROCEDURE — 36430 TRANSFUSION BLD/BLD COMPNT: CPT

## 2021-09-14 PROCEDURE — 87075 CULTR BACTERIA EXCEPT BLOOD: CPT | Performed by: SURGERY

## 2021-09-14 PROCEDURE — 87015 SPECIMEN INFECT AGNT CONCNTJ: CPT | Performed by: SURGERY

## 2021-09-14 PROCEDURE — 25010000002 VANCOMYCIN 1 G RECONSTITUTED SOLUTION 1 EACH VIAL: Performed by: SURGERY

## 2021-09-14 PROCEDURE — 87070 CULTURE OTHR SPECIMN AEROBIC: CPT | Performed by: SURGERY

## 2021-09-14 PROCEDURE — 25010000002 PROPOFOL 10 MG/ML EMULSION: Performed by: NURSE ANESTHETIST, CERTIFIED REGISTERED

## 2021-09-14 PROCEDURE — 25010000002 ONDANSETRON PER 1 MG: Performed by: NURSE ANESTHETIST, CERTIFIED REGISTERED

## 2021-09-14 PROCEDURE — 25010000002 DEXAMETHASONE PER 1 MG: Performed by: NURSE ANESTHETIST, CERTIFIED REGISTERED

## 2021-09-14 PROCEDURE — 86900 BLOOD TYPING SEROLOGIC ABO: CPT

## 2021-09-14 PROCEDURE — 87176 TISSUE HOMOGENIZATION CULTR: CPT | Performed by: SURGERY

## 2021-09-14 PROCEDURE — 99222 1ST HOSP IP/OBS MODERATE 55: CPT | Performed by: INTERNAL MEDICINE

## 2021-09-14 PROCEDURE — 25010000003 METHYLENE BLUE 50 MG/10ML SOLUTION: Performed by: SURGERY

## 2021-09-14 PROCEDURE — 25010000002 FENTANYL CITRATE (PF) 250 MCG/5ML SOLUTION: Performed by: NURSE ANESTHETIST, CERTIFIED REGISTERED

## 2021-09-14 PROCEDURE — P9016 RBC LEUKOCYTES REDUCED: HCPCS

## 2021-09-14 PROCEDURE — 87205 SMEAR GRAM STAIN: CPT | Performed by: SURGERY

## 2021-09-14 RX ORDER — LEVOTHYROXINE SODIUM 0.07 MG/1
75 TABLET ORAL
Status: DISCONTINUED | OUTPATIENT
Start: 2021-09-14 | End: 2021-09-16 | Stop reason: HOSPADM

## 2021-09-14 RX ORDER — LIDOCAINE HYDROCHLORIDE 20 MG/ML
INJECTION, SOLUTION EPIDURAL; INFILTRATION; INTRACAUDAL; PERINEURAL AS NEEDED
Status: DISCONTINUED | OUTPATIENT
Start: 2021-09-14 | End: 2021-09-14 | Stop reason: SURG

## 2021-09-14 RX ORDER — HYDROCODONE BITARTRATE AND ACETAMINOPHEN 7.5; 325 MG/1; MG/1
1 TABLET ORAL EVERY 4 HOURS PRN
Status: DISCONTINUED | OUTPATIENT
Start: 2021-09-14 | End: 2021-09-16 | Stop reason: HOSPADM

## 2021-09-14 RX ORDER — HYDROMORPHONE HYDROCHLORIDE 1 MG/ML
0.2 INJECTION, SOLUTION INTRAMUSCULAR; INTRAVENOUS; SUBCUTANEOUS
Status: DISCONTINUED | OUTPATIENT
Start: 2021-09-14 | End: 2021-09-14

## 2021-09-14 RX ORDER — SODIUM CHLORIDE 0.9 % (FLUSH) 0.9 %
3 SYRINGE (ML) INJECTION AS NEEDED
Status: DISCONTINUED | OUTPATIENT
Start: 2021-09-14 | End: 2021-09-14 | Stop reason: HOSPADM

## 2021-09-14 RX ORDER — SACCHAROMYCES BOULARDII 250 MG
250 CAPSULE ORAL DAILY
Status: DISCONTINUED | OUTPATIENT
Start: 2021-09-14 | End: 2021-09-16 | Stop reason: HOSPADM

## 2021-09-14 RX ORDER — ISOSORBIDE MONONITRATE 60 MG/1
60 TABLET, EXTENDED RELEASE ORAL
Status: DISCONTINUED | OUTPATIENT
Start: 2021-09-14 | End: 2021-09-16 | Stop reason: HOSPADM

## 2021-09-14 RX ORDER — ALUMINA, MAGNESIA, AND SIMETHICONE 2400; 2400; 240 MG/30ML; MG/30ML; MG/30ML
15 SUSPENSION ORAL EVERY 6 HOURS PRN
Status: DISCONTINUED | OUTPATIENT
Start: 2021-09-14 | End: 2021-09-16 | Stop reason: HOSPADM

## 2021-09-14 RX ORDER — VALACYCLOVIR HYDROCHLORIDE 500 MG/1
500 TABLET, FILM COATED ORAL
Status: DISCONTINUED | OUTPATIENT
Start: 2021-09-14 | End: 2021-09-16 | Stop reason: HOSPADM

## 2021-09-14 RX ORDER — SODIUM CHLORIDE 0.9 % (FLUSH) 0.9 %
3-10 SYRINGE (ML) INJECTION AS NEEDED
Status: DISCONTINUED | OUTPATIENT
Start: 2021-09-14 | End: 2021-09-14 | Stop reason: HOSPADM

## 2021-09-14 RX ORDER — BUPIVACAINE HYDROCHLORIDE 2.5 MG/ML
INJECTION, SOLUTION INFILTRATION; PERINEURAL AS NEEDED
Status: DISCONTINUED | OUTPATIENT
Start: 2021-09-14 | End: 2021-09-14 | Stop reason: HOSPADM

## 2021-09-14 RX ORDER — CLINDAMYCIN PHOSPHATE 900 MG/50ML
INJECTION INTRAVENOUS AS NEEDED
Status: DISCONTINUED | OUTPATIENT
Start: 2021-09-14 | End: 2021-09-14 | Stop reason: SURG

## 2021-09-14 RX ORDER — OXYCODONE AND ACETAMINOPHEN 10; 325 MG/1; MG/1
1 TABLET ORAL ONCE AS NEEDED
Status: DISCONTINUED | OUTPATIENT
Start: 2021-09-14 | End: 2021-09-14

## 2021-09-14 RX ORDER — CARVEDILOL 25 MG/1
25 TABLET ORAL 2 TIMES DAILY WITH MEALS
Status: DISCONTINUED | OUTPATIENT
Start: 2021-09-14 | End: 2021-09-16 | Stop reason: HOSPADM

## 2021-09-14 RX ORDER — ONDANSETRON 2 MG/ML
INJECTION INTRAMUSCULAR; INTRAVENOUS AS NEEDED
Status: DISCONTINUED | OUTPATIENT
Start: 2021-09-14 | End: 2021-09-14 | Stop reason: SURG

## 2021-09-14 RX ORDER — SODIUM CHLORIDE, SODIUM LACTATE, POTASSIUM CHLORIDE, CALCIUM CHLORIDE 600; 310; 30; 20 MG/100ML; MG/100ML; MG/100ML; MG/100ML
1000 INJECTION, SOLUTION INTRAVENOUS CONTINUOUS
Status: DISCONTINUED | OUTPATIENT
Start: 2021-09-14 | End: 2021-09-15

## 2021-09-14 RX ORDER — FUROSEMIDE 40 MG/1
40 TABLET ORAL DAILY
Status: DISCONTINUED | OUTPATIENT
Start: 2021-09-14 | End: 2021-09-16 | Stop reason: HOSPADM

## 2021-09-14 RX ORDER — DEXAMETHASONE SODIUM PHOSPHATE 4 MG/ML
INJECTION, SOLUTION INTRA-ARTICULAR; INTRALESIONAL; INTRAMUSCULAR; INTRAVENOUS; SOFT TISSUE AS NEEDED
Status: DISCONTINUED | OUTPATIENT
Start: 2021-09-14 | End: 2021-09-14 | Stop reason: SURG

## 2021-09-14 RX ORDER — FENTANYL CITRATE 50 UG/ML
25 INJECTION, SOLUTION INTRAMUSCULAR; INTRAVENOUS
Status: DISCONTINUED | OUTPATIENT
Start: 2021-09-14 | End: 2021-09-14

## 2021-09-14 RX ORDER — BACITRACIN ZINC 500 [USP'U]/G
OINTMENT TOPICAL AS NEEDED
Status: DISCONTINUED | OUTPATIENT
Start: 2021-09-14 | End: 2021-09-14 | Stop reason: HOSPADM

## 2021-09-14 RX ORDER — LABETALOL HYDROCHLORIDE 5 MG/ML
5 INJECTION, SOLUTION INTRAVENOUS
Status: DISCONTINUED | OUTPATIENT
Start: 2021-09-14 | End: 2021-09-14

## 2021-09-14 RX ORDER — ONDANSETRON 2 MG/ML
4 INJECTION INTRAMUSCULAR; INTRAVENOUS AS NEEDED
Status: DISCONTINUED | OUTPATIENT
Start: 2021-09-14 | End: 2021-09-14 | Stop reason: HOSPADM

## 2021-09-14 RX ORDER — NALOXONE HCL 0.4 MG/ML
0.04 VIAL (ML) INJECTION AS NEEDED
Status: DISCONTINUED | OUTPATIENT
Start: 2021-09-14 | End: 2021-09-14

## 2021-09-14 RX ORDER — ONDANSETRON 2 MG/ML
INJECTION INTRAMUSCULAR; INTRAVENOUS AS NEEDED
Status: DISCONTINUED | OUTPATIENT
Start: 2021-09-14 | End: 2021-09-14

## 2021-09-14 RX ORDER — SODIUM CHLORIDE 0.9 % (FLUSH) 0.9 %
3 SYRINGE (ML) INJECTION EVERY 12 HOURS SCHEDULED
Status: DISCONTINUED | OUTPATIENT
Start: 2021-09-14 | End: 2021-09-14 | Stop reason: HOSPADM

## 2021-09-14 RX ORDER — SODIUM CHLORIDE 0.9 % (FLUSH) 0.9 %
10 SYRINGE (ML) INJECTION EVERY 12 HOURS SCHEDULED
Status: DISCONTINUED | OUTPATIENT
Start: 2021-09-14 | End: 2021-09-16 | Stop reason: HOSPADM

## 2021-09-14 RX ORDER — PANTOPRAZOLE SODIUM 40 MG/1
40 TABLET, DELAYED RELEASE ORAL
Status: DISCONTINUED | OUTPATIENT
Start: 2021-09-14 | End: 2021-09-16 | Stop reason: HOSPADM

## 2021-09-14 RX ORDER — ONDANSETRON 2 MG/ML
4 INJECTION INTRAMUSCULAR; INTRAVENOUS EVERY 6 HOURS PRN
Status: DISCONTINUED | OUTPATIENT
Start: 2021-09-14 | End: 2021-09-16 | Stop reason: HOSPADM

## 2021-09-14 RX ORDER — MAGNESIUM HYDROXIDE 1200 MG/15ML
LIQUID ORAL AS NEEDED
Status: DISCONTINUED | OUTPATIENT
Start: 2021-09-14 | End: 2021-09-14 | Stop reason: HOSPADM

## 2021-09-14 RX ORDER — NEOSTIGMINE METHYLSULFATE 5 MG/5 ML
SYRINGE (ML) INTRAVENOUS AS NEEDED
Status: DISCONTINUED | OUTPATIENT
Start: 2021-09-14 | End: 2021-09-14

## 2021-09-14 RX ORDER — SUCRALFATE 1 G/1
1 TABLET ORAL
Status: DISCONTINUED | OUTPATIENT
Start: 2021-09-14 | End: 2021-09-16 | Stop reason: HOSPADM

## 2021-09-14 RX ORDER — ASPIRIN 81 MG/1
81 TABLET ORAL 2 TIMES DAILY
Status: ON HOLD | COMMUNITY
End: 2021-09-16 | Stop reason: SDUPTHER

## 2021-09-14 RX ORDER — SODIUM CHLORIDE 9 MG/ML
INJECTION, SOLUTION INTRAVENOUS CONTINUOUS PRN
Status: DISCONTINUED | OUTPATIENT
Start: 2021-09-14 | End: 2021-09-14 | Stop reason: SURG

## 2021-09-14 RX ORDER — LIDOCAINE HYDROCHLORIDE 40 MG/ML
SOLUTION TOPICAL AS NEEDED
Status: DISCONTINUED | OUTPATIENT
Start: 2021-09-14 | End: 2021-09-14 | Stop reason: SURG

## 2021-09-14 RX ORDER — LIDOCAINE 50 MG/G
1 PATCH TOPICAL DAILY PRN
Status: DISCONTINUED | OUTPATIENT
Start: 2021-09-14 | End: 2021-09-16 | Stop reason: HOSPADM

## 2021-09-14 RX ORDER — SODIUM CHLORIDE 0.9 % (FLUSH) 0.9 %
10 SYRINGE (ML) INJECTION AS NEEDED
Status: DISCONTINUED | OUTPATIENT
Start: 2021-09-14 | End: 2021-09-16 | Stop reason: HOSPADM

## 2021-09-14 RX ORDER — FENTANYL CITRATE 50 UG/ML
INJECTION, SOLUTION INTRAMUSCULAR; INTRAVENOUS AS NEEDED
Status: DISCONTINUED | OUTPATIENT
Start: 2021-09-14 | End: 2021-09-14 | Stop reason: SURG

## 2021-09-14 RX ORDER — LIDOCAINE HYDROCHLORIDE 10 MG/ML
0.5 INJECTION, SOLUTION EPIDURAL; INFILTRATION; INTRACAUDAL; PERINEURAL ONCE AS NEEDED
Status: DISCONTINUED | OUTPATIENT
Start: 2021-09-14 | End: 2021-09-14 | Stop reason: HOSPADM

## 2021-09-14 RX ORDER — NEOSTIGMINE METHYLSULFATE 5 MG/5 ML
SYRINGE (ML) INTRAVENOUS AS NEEDED
Status: DISCONTINUED | OUTPATIENT
Start: 2021-09-14 | End: 2021-09-14 | Stop reason: SURG

## 2021-09-14 RX ORDER — ROCURONIUM BROMIDE 10 MG/ML
INJECTION, SOLUTION INTRAVENOUS AS NEEDED
Status: DISCONTINUED | OUTPATIENT
Start: 2021-09-14 | End: 2021-09-14 | Stop reason: SURG

## 2021-09-14 RX ORDER — ASPIRIN 81 MG/1
81 TABLET ORAL 2 TIMES DAILY WITH MEALS
Status: DISCONTINUED | OUTPATIENT
Start: 2021-09-14 | End: 2021-09-15

## 2021-09-14 RX ORDER — PROPOFOL 10 MG/ML
VIAL (ML) INTRAVENOUS AS NEEDED
Status: DISCONTINUED | OUTPATIENT
Start: 2021-09-14 | End: 2021-09-14 | Stop reason: SURG

## 2021-09-14 RX ORDER — HYDROCODONE BITARTRATE AND ACETAMINOPHEN 7.5; 325 MG/1; MG/1
2 TABLET ORAL EVERY 4 HOURS PRN
Status: DISCONTINUED | OUTPATIENT
Start: 2021-09-14 | End: 2021-09-16 | Stop reason: HOSPADM

## 2021-09-14 RX ORDER — FLUMAZENIL 0.1 MG/ML
0.2 INJECTION INTRAVENOUS AS NEEDED
Status: DISCONTINUED | OUTPATIENT
Start: 2021-09-14 | End: 2021-09-14

## 2021-09-14 RX ORDER — SODIUM CHLORIDE, SODIUM LACTATE, POTASSIUM CHLORIDE, CALCIUM CHLORIDE 600; 310; 30; 20 MG/100ML; MG/100ML; MG/100ML; MG/100ML
100 INJECTION, SOLUTION INTRAVENOUS CONTINUOUS
Status: DISCONTINUED | OUTPATIENT
Start: 2021-09-14 | End: 2021-09-14

## 2021-09-14 RX ORDER — DOCUSATE SODIUM 100 MG/1
100 CAPSULE, LIQUID FILLED ORAL 2 TIMES DAILY
Status: DISCONTINUED | OUTPATIENT
Start: 2021-09-14 | End: 2021-09-16 | Stop reason: HOSPADM

## 2021-09-14 RX ORDER — NITROGLYCERIN 0.4 MG/1
0.4 TABLET SUBLINGUAL
Status: DISCONTINUED | OUTPATIENT
Start: 2021-09-14 | End: 2021-09-16 | Stop reason: HOSPADM

## 2021-09-14 RX ORDER — ACETAMINOPHEN 500 MG
1000 TABLET ORAL ONCE
Status: COMPLETED | OUTPATIENT
Start: 2021-09-14 | End: 2021-09-14

## 2021-09-14 RX ORDER — POTASSIUM CHLORIDE 750 MG/1
20 CAPSULE, EXTENDED RELEASE ORAL DAILY
Status: DISCONTINUED | OUTPATIENT
Start: 2021-09-14 | End: 2021-09-16 | Stop reason: HOSPADM

## 2021-09-14 RX ADMIN — FENTANYL CITRATE 50 MCG: 50 INJECTION, SOLUTION INTRAMUSCULAR; INTRAVENOUS at 09:07

## 2021-09-14 RX ADMIN — ROCURONIUM BROMIDE 50 MG: 50 INJECTION INTRAVENOUS at 08:33

## 2021-09-14 RX ADMIN — LIDOCAINE HYDROCHLORIDE 1 EACH: 40 SOLUTION TOPICAL at 08:35

## 2021-09-14 RX ADMIN — LIDOCAINE HYDROCHLORIDE 100 MG: 20 INJECTION, SOLUTION EPIDURAL; INFILTRATION; INTRACAUDAL; PERINEURAL at 08:33

## 2021-09-14 RX ADMIN — FENTANYL CITRATE 100 MCG: 50 INJECTION, SOLUTION INTRAMUSCULAR; INTRAVENOUS at 08:33

## 2021-09-14 RX ADMIN — ACETAMINOPHEN 1000 MG: 500 TABLET, FILM COATED ORAL at 08:20

## 2021-09-14 RX ADMIN — SODIUM CHLORIDE, POTASSIUM CHLORIDE, SODIUM LACTATE AND CALCIUM CHLORIDE 1000 ML: 600; 310; 30; 20 INJECTION, SOLUTION INTRAVENOUS at 07:54

## 2021-09-14 RX ADMIN — SUCRALFATE 1 G: 1 TABLET ORAL at 20:19

## 2021-09-14 RX ADMIN — SODIUM CHLORIDE, PRESERVATIVE FREE 10 ML: 5 INJECTION INTRAVENOUS at 20:19

## 2021-09-14 RX ADMIN — CLINDAMYCIN IN 5 PERCENT DEXTROSE 900 MG: 18 INJECTION, SOLUTION INTRAVENOUS at 08:46

## 2021-09-14 RX ADMIN — SUCRALFATE 1 G: 1 TABLET ORAL at 17:07

## 2021-09-14 RX ADMIN — ROCURONIUM BROMIDE 20 MG: 50 INJECTION INTRAVENOUS at 09:36

## 2021-09-14 RX ADMIN — ASPIRIN 81 MG: 81 TABLET, COATED ORAL at 17:06

## 2021-09-14 RX ADMIN — CARVEDILOL 25 MG: 25 TABLET, FILM COATED ORAL at 17:06

## 2021-09-14 RX ADMIN — ISOSORBIDE MONONITRATE 60 MG: 60 TABLET, EXTENDED RELEASE ORAL at 17:06

## 2021-09-14 RX ADMIN — FENTANYL CITRATE 50 MCG: 50 INJECTION, SOLUTION INTRAMUSCULAR; INTRAVENOUS at 09:37

## 2021-09-14 RX ADMIN — SODIUM CHLORIDE: 9 INJECTION, SOLUTION INTRAVENOUS at 08:40

## 2021-09-14 RX ADMIN — Medication 3 MG: at 10:42

## 2021-09-14 RX ADMIN — FENTANYL CITRATE 50 MCG: 50 INJECTION, SOLUTION INTRAMUSCULAR; INTRAVENOUS at 10:19

## 2021-09-14 RX ADMIN — GLYCOPYRROLATE 0.4 MG: 0.2 INJECTION, SOLUTION INTRAMUSCULAR; INTRAVENOUS at 10:42

## 2021-09-14 RX ADMIN — DEXAMETHASONE SODIUM PHOSPHATE 4 MG: 4 INJECTION, SOLUTION INTRA-ARTICULAR; INTRALESIONAL; INTRAMUSCULAR; INTRAVENOUS; SOFT TISSUE at 08:59

## 2021-09-14 RX ADMIN — SODIUM CHLORIDE, POTASSIUM CHLORIDE, SODIUM LACTATE AND CALCIUM CHLORIDE 100 ML/HR: 600; 310; 30; 20 INJECTION, SOLUTION INTRAVENOUS at 08:23

## 2021-09-14 RX ADMIN — SODIUM CHLORIDE, POTASSIUM CHLORIDE, SODIUM LACTATE AND CALCIUM CHLORIDE 1000 ML: 600; 310; 30; 20 INJECTION, SOLUTION INTRAVENOUS at 12:52

## 2021-09-14 RX ADMIN — PROPOFOL 60 MG: 10 INJECTION, EMULSION INTRAVENOUS at 08:32

## 2021-09-14 RX ADMIN — ONDANSETRON 4 MG: 2 INJECTION INTRAMUSCULAR; INTRAVENOUS at 10:42

## 2021-09-14 RX ADMIN — APIXABAN 5 MG: 5 TABLET, FILM COATED ORAL at 20:19

## 2021-09-14 NOTE — CONSULTS
Patient Care Team:  Davon Urrutia MD as PCP - General  [facility], Moises Matson MD as Consulting Physician (Otolaryngology)  Christiano Rao PA as Physician Assistant (Otolaryngology)  Candelaria David MD as Obstetrician (Obstetrics and Gynecology)  Omar Hernandez MD as Cardiologist (Cardiology)  Marlin Aguilar, RN as Ambulatory  ([facility] [place])  Amadeo Turner MD  REASON FOR REFERRAL: Continuity of care  Chief complaint: weakness     Subjective     Patient is a 66 y.o. female presents with a chronic right hip wound.  Today she successfully underwent debridement of chronic right hip wound, advancement of right T FL myocutaneous flap, and advancement of right gluteal fasciocutaneous flap.  Following surgery, she has been admitted to the hospitalist team.  Cardiology consultation has been requested for continuity of care.  It was noted that she had a hemoglobin of 8.2 preoperatively and received 1 unit of PRBCs intraoperatively.    The patient has had a complicated course over the past year.  She states her issues started in January with rupture of gluteus minimus tendon and subsequent right hip pain.  She has undergone multiple surgeries.  In February/March, she did develop MSSA bacteremia.  In March she also had surgery for osteomyelitis of the lumbar spine.  She has had a cervical final procedure within the year as well.  She reports a history of rheumatoid arthritis.    She was diagnosed with a left lower extremity DVT on 6/23/2021.  She is anticoagulated with Eliquis for this.    In terms of her cardiac history, she follows with Dr. Hernandez for history of CAD (RCA PCI in '10, followed by NSTEMI requiring LAD PCI in '13.  Most recent cath, done 9/3/19 by Dr. Melchor, reported patent stents and no other obstructive CAD).  She also has a history of sick sinus syndrome with dual-chamber pacemaker in place.  This was previously placed for syncopal episodes.  She last saw Dr. Hernandez in  August 2020 in clinic.  At that point, recommendations were made to continue aspirin indefinitely, could stop Brilinta and transition back to Plavix (ultimately she was greater than 1 year from PCI, so would not require continued Brilinta and Plavix could be interrupted if needed for surgeries.)  It appears at some point along the way the Plavix was discontinued.  Repatha, Aldactone, Coreg were continued, as well as Imdur for antianginal benefit.  Repatha was continued as well.    Today the patient reports she has been feeling well overall from a cardiac standpoint.  She denies any chest pain, shortness of breath, palpitations whatsoever.  She denies any significant edema-she states she takes Lasix chronically to prevent this.  She also denies orthopnea, PND, syncope or presyncope.  She tells me she is no longer taking Repatha due to cost/coverage issues.  She has been compliant with her other medical therapy, although it looks like she no longer takes Aldactone.    She did have a ESTEFANI and TTE in February 2021.  See results below.    TTE:    · Estimated left ventricular EF = 65% Left ventricular systolic function is normal.  · Normal right ventricular cavity size and systolic function noted.  · All valves appear structurally and functionally normal with no evidence of any hemodynamically significant disease.  · There is no evidence of right to left shunt on bubble study.  · There is no evidence of intracardiac mass or thrombus.       ESTEFANI:    · Left ventricular systolic function is normal.  · Mild mitral valve regurgitation is present.  · There is no evidence of vegetations on either the valves or pacemaker leads.           Review of Systems   Review of Systems   Constitutional: Positive for fatigue. Negative for diaphoresis, fever and unexpected weight change.   Respiratory: Negative for apnea, cough, chest tightness, shortness of breath and wheezing.    Cardiovascular: Negative for chest pain, palpitations and leg  "swelling.   Gastrointestinal: Negative for abdominal distention, nausea and vomiting.   Genitourinary: Negative for hematuria.   Skin: Positive for wound (right hip area ).   Neurological: Positive for weakness. Negative for dizziness, syncope and light-headedness.       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   • Eustachian tube dysfunction    • Heart disease    • Herpes simplex    • Hyperlipidemia    • Hypertension    • Hypothyroidism 12/23/2020   • Intrinsic asthma 12/23/2020   • Knee dislocation    • Labral tear of right hip joint    • Laryngitis sicca    • Laryngitis, chronic    • Left carotid bruit 3/9/2016   • MI (myocardial infarction) (CMS/[facility])    • Myalgia due to statin 6/25/2019   • Osteomyelitis of right femur (CMS/[facility]) 7/6/2021   • Otorrhea    • Pacemaker 11/17/2016   • Primary osteoarthritis of left knee 12/23/2020   • Psoriasis vulgaris 12/23/2020   • S/P coronary artery stent placement 3/9/2016   • Sensorineural hearing loss    • Seropositive rheumatoid arthritis of multiple sites (CMS/[facility]) 12/27/2019    Overview:  -myochrysine '93-'96 -methotrexate '96--->11/98;r/s  restarted 2/99--> 8/14 (anemia) -sulfasalazine- not effective -penicillamine 6/98-->10/98; no effect -leflunomide 11/98--> - Humira '13-->didn't take - Enbrel 12/14-->3/15- no effect!   Last Assessment & Plan:  - \"aching all over\" because she had to be off her anti-rheumatic drugs for 2 weeks in preparation for her R knee surgery - he   • Sick sinus " syndrome (CMS/[facility]) 12/27/2019   • Sjogren's disease (CMS/[facility])    • Spondylolisthesis of lumbar region 1/17/2018   • Syncope, recurrent 2/8/2021     Past Surgical History:   Procedure Laterality Date   • A-V CARDIAC PACEMAKER INSERTION  2016   • ATRIAL CARDIAC PACEMAKER INSERTION     • CARDIAC CATHETERIZATION     • CATARACT EXTRACTION     • CERVICAL CORPECTOMY N/A 3/3/2021    Procedure: CERVICAL 6 CORPECTOMY WITH TITANIUM CAGE WITH NEURO MONITORING;  Surgeon: Bandar Shea MD;  Location:  [facility] OR;  Service: Neurosurgery;  Laterality: N/A;   • COLONOSCOPY  11/08/2011    One fold in the ascending colon which showed ulcer otherwise normal exam   • COLONOSCOPY  11/12/2004    Normal exam repeat in five years   • CORONARY ANGIOPLASTY WITH STENT PLACEMENT      X 2; 2013 & 2014   • ENDOSCOPY  07/10/2014    Normal exam   • HIP ABDUCTION TENOTOMY BILATERAL Right 1/14/2021    Procedure: RIGHT HIP GLUTEUS MEDLUS / MINIMUS REPAIR, POSSIBLE ACHILLES ALLOGRAFT;  Surgeon: Nino Carlson MD;  Location:  [facility] OR;  Service: Orthopedics;  Laterality: Right;   • INCISION AND DRAINAGE HIP Right 2/9/2021    Procedure: HIP INCISION AND DRAINAGE;  Surgeon: Nino Carlson MD;  Location:  [facility] OR;  Service: Orthopedics;  Laterality: Right;   • INCISION AND DRAINAGE OF WOUND Right 7/8/2021    Procedure: INCISION AND DRAINAGE WOUND RIGHT HIP;  Surgeon: James Huntley MD;  Location:  [facility] OR;  Service: Orthopedics;  Laterality: Right;   • JOINT REPLACEMENT     • LUMBAR DISCECTOMY Right 3/23/2021    Procedure: LUMBAR DISCECTOMY MICRO, Lumbar 1/2 right;  Surgeon: Bandar Shea MD;  Location:  [facility] OR;  Service: Neurosurgery;  Laterality: Right;   • LUMBAR FUSION N/A 1/19/2018    Procedure: L3-4,L4-5 DECOMPRESSION, POSTERIOR SPINAL FUSION WITH INSTRUMENTATION;  Surgeon: Fortino Oropeza MD;  Location:  [facility] OR;  Service:    • LUMBAR LAMINECTOMY WITH FUSION Left 1/17/2018    Procedure: LEFT L3-4 L4-5 LATERAL LUMBAR  INTERBODY FUSION;  Surgeon: Fortino Oropeza MD;  Location: [facility];  Service:    • MYRINGOTOMY W/ TUBES  09/04/2014    TUBES NO LONGER IN PLACE   • OTHER SURGICAL HISTORY      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 so hardware was removed and spacers were placed   • REPLACEMENT TOTAL KNEE Right      Family History   Problem Relation Age of Onset   • Cancer Mother    • Heart disease Father      Social History     Tobacco Use   • Smoking status: Never Smoker   • Smokeless tobacco: Never Used   Substance Use Topics   • Alcohol use: No   • Drug use: Never     Medications Prior to Admission   Medication Sig Dispense Refill Last Dose   • acetaminophen (TYLENOL) 325 MG tablet Take 2 tablets by mouth Every 4 (Four) Hours As Needed for Mild Pain .   Past Week at 0900   • apixaban (ELIQUIS) 5 MG tablet tablet 1 twice daily  Indications: DVT/PE (active thrombosis) 60 tablet 2 9/9/2021 at 0900   • aspirin 81 MG EC tablet Take 81 mg by mouth 2 (two) times a day.   9/14/2021 at 0630   • carvedilol (COREG) 25 MG tablet Take 1 tablet by mouth 2 (Two) Times a Day With Meals. 180 tablet 3 9/14/2021 at 0630   • docusate sodium 100 MG capsule Take 1 capsule by mouth 2 (Two) Times a Day.   9/13/2021 at 2100   • fluticasone (FLONASE) 50 MCG/ACT nasal spray 1 spray into the nostril(s) as directed by provider Daily As Needed.   Past Month at Unknown time   • furosemide (LASIX) 40 MG tablet Take 40 mg by mouth Daily.   9/11/2021   • lidocaine (LIDODERM) 5 % Place 1 patch on the skin as directed by provider Daily As Needed. Remove & Discard patch within 12 hours or as directed by MD    8/31/2021   • magnesium oxide (MAG-OX) 400 MG tablet Take 400 mg by mouth Daily.   9/13/2021 at 0900   • multivitamin with minerals tablet tablet Take 1 tablet by mouth Daily.   9/13/2021 at 0900   • nitroglycerin (Nitrostat) 0.4 MG SL tablet Place 1 tablet under the tongue Every 5 (Five) Minutes As Needed for Chest Pain. Take no more than 3 doses in 15 minutes.   12    • pantoprazole (PROTONIX) 40 MG EC tablet Take 1 tablet by mouth Daily. 90 tablet 3 9/13/2021 at 0900   • potassium chloride (K-DUR,KLOR-CON) 20 MEQ CR tablet Take 20 mEq by mouth Daily.   9/13/2021 at 0900   • saccharomyces boulardii (Florastor) 250 MG capsule Take 1 capsule by mouth Daily. 90 capsule 3 9/13/2021 at 0900   • sucralfate (CARAFATE) 1 g tablet Take 1 g by mouth 4 (Four) Times a Day Before Meals & at Bedtime.   9/13/2021 at 2100   • traMADol (ULTRAM) 50 MG tablet Take 50 mg by mouth 3 (Three) Times a Day.   Past Week at Unknown time   • valACYclovir (VALTREX) 500 MG tablet Take 1 tablet by mouth Daily. Indications: chronic suppressive therapy 90 tablet 3 9/13/2021 at 0900   • isosorbide mononitrate (IMDUR) 60 MG 24 hr tablet Take 1 tablet by mouth 2 (Two) Times a Day.   9/13/2021 at 2100   • levothyroxine (SYNTHROID, LEVOTHROID) 75 MCG tablet Take 1 tablet by mouth Daily. 90 tablet 3 9/13/2021 at 0900       Current Facility-Administered Medications:   •  aluminum-magnesium hydroxide-simethicone (MAALOX MAX) 400-400-40 MG/5ML suspension 15 mL, 15 mL, Oral, Q6H PRN, Nayan Hale S, DO  •  apixaban (ELIQUIS) tablet 5 mg, 5 mg, Oral, Q12H, Nayan Hale S, DO  •  aspirin EC tablet 81 mg, 81 mg, Oral, BID With Meals, Nayan Hale S, DO  •  carvedilol (COREG) tablet 25 mg, 25 mg, Oral, BID With Meals, Nayan Hale S, DO  •  docusate sodium (COLACE) capsule 100 mg, 100 mg, Oral, BID, Nayan Hale S, DO  •  furosemide (LASIX) tablet 40 mg, 40 mg, Oral, Daily, Nayan Hale S, DO  •  HYDROcodone-acetaminophen (NORCO) 7.5-325 MG per tablet 1 tablet, 1 tablet, Oral, Q4H PRN, Nayan Hale S, DO  •  HYDROcodone-acetaminophen (NORCO) 7.5-325 MG per tablet 2 tablet, 2 tablet, Oral, Q4H PRN, Nayan Hale, DO  •  isosorbide mononitrate (IMDUR) 24 hr tablet 60 mg, 60 mg, Oral, BID - Nitrates, Nayan Hale, DO  •  lactated ringers infusion 1,000 mL, 1,000 mL,  Intravenous, Continuous, Amadeo Turner MD, Last Rate: 25 mL/hr at 09/14/21 0754, Restarted at 09/14/21 0825  •  lactated ringers infusion 1,000 mL, 1,000 mL, Intravenous, Continuous, Nayan Hale DO, Last Rate: 25 mL/hr at 09/14/21 1252, 1,000 mL at 09/14/21 1252  •  levothyroxine (SYNTHROID, LEVOTHROID) tablet 75 mcg, 75 mcg, Oral, Q AM, Nayan Hale DO  •  lidocaine (LIDODERM) 5 % 1 patch, 1 patch, Transdermal, Daily PRN, Naayn Hale DO  •  magnesium oxide (MAG-OX) tablet 400 mg, 400 mg, Oral, Daily, Nayan Hale DO  •  nitroglycerin (NITROSTAT) SL tablet 0.4 mg, 0.4 mg, Sublingual, Q5 Min PRN, Nayan Hale DO  •  ondansetron (ZOFRAN) injection 4 mg, 4 mg, Intravenous, Q6H PRN, Nayan Hale DO  •  pantoprazole (PROTONIX) EC tablet 40 mg, 40 mg, Oral, Q AM, Nayan Hale DO  •  potassium chloride (MICRO-K) CR capsule 20 mEq, 20 mEq, Oral, Daily, Nayan Hale DO  •  saccharomyces boulardii (FLORASTOR) capsule 250 mg, 250 mg, Oral, Daily, Nayan Hale DO  •  sodium chloride 0.9 % flush 10 mL, 10 mL, Intravenous, Q12H, Nayan Hale DO  •  sodium chloride 0.9 % flush 10 mL, 10 mL, Intravenous, PRN, Nayan Hale DO  •  sucralfate (CARAFATE) tablet 1 g, 1 g, Oral, 4x Daily AC & at Bedtime, Nayan Hale DO  •  valACYclovir (VALTREX) tablet 500 mg, 500 mg, Oral, Q24H, Nayan Hale DO  Allergies:  Atorvastatin, Amoxicillin, Escitalopram, Nabumetone, Niacin er, Penicillins, and Simvastatin    Objective     Vital Signs  Temp:  [94.4 °F (34.7 °C)-98.2 °F (36.8 °C)] 94.4 °F (34.7 °C)  Heart Rate:  [59-88] 62  Resp:  [12-18] 14  BP: (115-158)/(56-94) 151/67    Physical Exam:   Vitals and nursing note reviewed.   Constitutional:       General: Not in acute distress.     Appearance: Well-developed and not in distress. Chronically ill-appearing. Not diaphoretic.   Neck:      Vascular: No JVD.   Pulmonary:      Effort:  Pulmonary effort is normal. No respiratory distress.      Breath sounds: Normal breath sounds.   Cardiovascular:      Normal rate. Regular rhythm.      Murmurs: There is no murmur.   Edema:     Peripheral edema absent.   Abdominal:      Tenderness: There is no abdominal tenderness.   Skin:     General: Skin is warm and dry.      Comments: Bandage to right hip CDI, GUILLERMO drain in place with blood drainage within the drain    Neurological:      Mental Status: Alert and oriented to person, place, and time.       Results Review:     Lab Results (last 72 hours)     Procedure Component Value Units Date/Time    Tissue / Bone Culture - Tissue, Hip, Right [[id number]] Collected: 09/14/21 0939    Specimen: Tissue from Hip, Right Updated: 09/14/21 1539     Gram Stain No WBCs or organisms seen    Anaerobic Culture - Tissue, Hip, Right [[id number]] Collected: 09/14/21 0939    Specimen: Tissue from Hip, Right Updated: 09/14/21 1119    Fungus Culture - Tissue, Hip, Right [[id number]] Collected: 09/14/21 0939    Specimen: Tissue from Hip, Right Updated: 09/14/21 1119          Assessment/Plan     1.  Chronic right hip wound status post debridement with advancement of right myocutaneous flap and right gluteal fasciocutaneous flap 9/14 per Dr. Turner     2.  Left lower extremity DVT diagnosed 6/23/2021-anticoagulated with Eliquis per admitting team    3.  Coronary artery disease: Stable, no angina.  -Continue aspirin 81 mg daily indefinitely without interruption given her history of prior stent placement.  -Continue Coreg, Imdur at current doses  -She states she has been without her Repatha due to cost/coverage issues-will work on getting this covered and resumed as an outpatient  -She has no documented history of CHF, but she states she does take Lasix chronically to prevent recurrent leg swelling-stable    4.  Sick sinus syndrome with pacemaker in place: With dual-chamber pacemaker in place.  I did review her most recent device  interrogation on 7/8/2021.  Normal device function, satisfactory battery life, 25% right atrial pacing, 0.03% right ventricular pacing.  0% AT/AF.  No arrhythmia events noted.    5.  Rheumatoid arthritis  6.  History of cervical stenosis status post cervical spinal surgery  7.  History of discitis/osteomyelitis L1/L2  8.  History of MSSA bacteremia in March 2021  9.  Anemia    At this point, she remains stable from a cardiac standpoint.  We will follow peripherally.  Please call with any questions or issues.  Thank you.    I discussed the patient's findings and my recommendations with patient.     Electronically signed by DANIELA Grace, 09/14/21, 4:39 PM CDT.

## 2021-09-14 NOTE — NURSING NOTE
1 unit PRBC's ordered and sent for, not available upon discharge from holding area, OR nurse aware.

## 2021-09-14 NOTE — ANESTHESIA PROCEDURE NOTES
Airway  Urgency: elective    Date/Time: 9/14/2021 8:35 AM  Airway not difficult    General Information and Staff    Patient location during procedure: OR  CRNA: Amor Crane CRNA    Indications and Patient Condition  Indications for airway management: airway protection    Preoxygenated: yes  Mask difficulty assessment: 1 - vent by mask    Final Airway Details  Final airway type: endotracheal airway      Successful airway: ETT  Cuffed: yes   Successful intubation technique: direct laryngoscopy  Endotracheal tube insertion site: oral  Blade: Nikko  Blade size: 3  ETT size (mm): 7.0  Cormack-Lehane Classification: grade IIb - view of arytenoids or posterior of glottis only  Placement verified by: chest auscultation and capnometry   Cuff volume (mL): 8  Measured from: lips  ETT/EBT  to lips (cm): 21  Number of attempts at approach: 1

## 2021-09-14 NOTE — H&P
[facility] Medicine Services  HISTORY AND PHYSICAL    Date of Admission: 9/14/2021  Primary Care Physician: Davon Urrutia MD    Subjective     Chief Complaint:   Postop admission    History of Present Illness    This this 66-year-old female is admitted postoperatively after a surgical procedure performed by Dr. Turner which included debridement of chronic right hip wound with advancement of right T FL myocutaneous flap and right gluteal fasciocutaneous flap.  She received 1 unit of PRBCs intraoperatively as she was noted to have a hemoglobin of 8.2 preoperatively.  She is doing quite well postoperatively and is alert, talkative and interactive.  The patient has a significant cardiac history as well.  Dr. Turner will be consulted for continuation of postoperative care.  Dr. Hernandez will be consulted for continuity of care for cardiac disease.    Review of Systems   Constitutional: Negative for appetite change.   HENT: Negative.    Eyes: Negative.    Respiratory: Negative.    Cardiovascular: Negative.    Gastrointestinal: Negative.    Endocrine: Negative.    Genitourinary: Negative.    Musculoskeletal: Positive for gait problem.   Skin: Negative.    Allergic/Immunologic: Negative.    Neurological: Positive for weakness.   Hematological: Negative.    Psychiatric/Behavioral: Negative.         Otherwise complete ROS reviewed and negative except as mentioned in the HPI.      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/2020   • Eustachian tube dysfunction    • Heart disease    • Herpes simplex    • Hyperlipidemia    • Hypertension    • Hypothyroidism 12/23/2020   • Intrinsic asthma 12/23/2020   • Knee dislocation    • Labral tear of right hip joint    • Laryngitis sicca    • Laryngitis, chronic    • Left carotid bruit 3/9/2016   • MI (myocardial infarction) (CMS/HCC)    • Myalgia due to statin 6/25/2019   • Osteomyelitis of right femur (CMS/HCC) 7/6/2021   • Otorrhea    • Pacemaker 11/17/2016   • Primary osteoarthritis of left knee 12/23/2020   • Psoriasis vulgaris 12/23/2020   • S/P coronary artery stent placement 3/9/2016   • Sensorineural hearing loss    • Seropositive rheumatoid arthritis of multiple sites (CMS/HCC) 12/27/2019    Overview:  -myochrysine '93-'96 -methotrexate '96--->11/98;r/s  restarted 2/99--> 8/14 (anemia) -sulfasalazine- not effective -penicillamine 6/98-->10/98; no effect -leflunomide 11/98--> - Humira '13-->didn't take - Enbrel 12/14-->3/15- no effect!   Last Assessment & Plan:  - \"aching all over\" because she had to be off her anti-rheumatic drugs for 2 weeks in preparation for her R knee surgery - he   • Sick sinus syndrome (CMS/HCC) 12/27/2019   • Sjogren's disease (CMS/HCC)    • Spondylolisthesis of lumbar region 1/17/2018   • Syncope, recurrent 2/8/2021       Past Surgical History:  Past Surgical History:   Procedure Laterality Date   • A-V CARDIAC PACEMAKER INSERTION  2016   • ATRIAL CARDIAC PACEMAKER INSERTION     • CARDIAC CATHETERIZATION     • CATARACT EXTRACTION     • CERVICAL CORPECTOMY N/A 3/3/2021    Procedure: CERVICAL 6 CORPECTOMY WITH TITANIUM CAGE WITH NEURO MONITORING;  Surgeon: Bandar Shea MD;  Location: [facility];  Service: Neurosurgery;  Laterality: N/A;   • COLONOSCOPY  11/08/2011    One fold in the ascending colon which showed ulcer otherwise normal exam   • COLONOSCOPY  11/12/2004    Normal exam repeat in five years   • CORONARY " ANGIOPLASTY WITH STENT PLACEMENT      X 2; 2013 & 2014   • ENDOSCOPY  07/10/2014    Normal exam   • HIP ABDUCTION TENOTOMY BILATERAL Right 1/14/2021    Procedure: RIGHT HIP GLUTEUS MEDLUS / MINIMUS REPAIR, POSSIBLE ACHILLES ALLOGRAFT;  Surgeon: Nino Carlson MD;  Location:  [facility] OR;  Service: Orthopedics;  Laterality: Right;   • INCISION AND DRAINAGE HIP Right 2/9/2021    Procedure: HIP INCISION AND DRAINAGE;  Surgeon: Nino Carlson MD;  Location:  [facility] OR;  Service: Orthopedics;  Laterality: Right;   • INCISION AND DRAINAGE OF WOUND Right 7/8/2021    Procedure: INCISION AND DRAINAGE WOUND RIGHT HIP;  Surgeon: James Huntley MD;  Location:  [facility];  Service: Orthopedics;  Laterality: Right;   • JOINT REPLACEMENT     • LUMBAR DISCECTOMY Right 3/23/2021    Procedure: LUMBAR DISCECTOMY MICRO, Lumbar 1/2 right;  Surgeon: Bandar Shea MD;  Location:  [facility];  Service: Neurosurgery;  Laterality: Right;   • LUMBAR FUSION N/A 1/19/2018    Procedure: L3-4,L4-5 DECOMPRESSION, POSTERIOR SPINAL FUSION WITH INSTRUMENTATION;  Surgeon: Fortino Oropeza MD;  Location:  [facility];  Service:    • LUMBAR LAMINECTOMY WITH FUSION Left 1/17/2018    Procedure: LEFT L3-4 L4-5 LATERAL LUMBAR INTERBODY FUSION;  Surgeon: Fortino Oropeza MD;  Location: [facility] OR;  Service:    • MYRINGOTOMY W/ TUBES  09/04/2014    TUBES NO LONGER IN PLACE   • OTHER SURGICAL HISTORY      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 so hardware was removed and spacers were placed   • REPLACEMENT TOTAL KNEE Right        Family History:   family history includes Cancer in her mother; Heart disease in her father.    Social History:    reports that she has never smoked. She has never used smokeless tobacco. She reports that she does not drink alcohol and does not use drugs.    Medications:  Prior to Admission medications    Medication Sig Start Date End Date Taking? Authorizing Provider   acetaminophen (TYLENOL) 325 MG tablet Take 2 tablets by mouth  Every 4 (Four) Hours As Needed for Mild Pain . 7/13/21  Yes Christos Min DO   apixaban (ELIQUIS) 5 MG tablet tablet 1 twice daily  Indications: DVT/PE (active thrombosis) 7/1/21  Yes Davon Urrutia MD   aspirin 81 MG EC tablet Take 81 mg by mouth 2 (two) times a day.   Yes Leila Daly MD   carvedilol (COREG) 25 MG tablet Take 1 tablet by mouth 2 (Two) Times a Day With Meals. 7/1/21  Yes Davon Urrutia MD   docusate sodium 100 MG capsule Take 1 capsule by mouth 2 (Two) Times a Day. 7/13/21  Yes Christos Min DO   fluticasone (FLONASE) 50 MCG/ACT nasal spray 1 spray into the nostril(s) as directed by provider Daily As Needed. 5/1/21  Yes Leila Daly MD   furosemide (LASIX) 40 MG tablet Take 40 mg by mouth Daily.   Yes Leila Daly MD   lidocaine (LIDODERM) 5 % Place 1 patch on the skin as directed by provider Daily As Needed. Remove & Discard patch within 12 hours or as directed by MD    Yes Leila Daly MD   magnesium oxide (MAG-OX) 400 MG tablet Take 400 mg by mouth Daily. 4/12/21  Yes Liela Daly MD   multivitamin with minerals tablet tablet Take 1 tablet by mouth Daily. 3/6/21  Yes Taiwo Prado APRN   nitroglycerin (Nitrostat) 0.4 MG SL tablet Place 1 tablet under the tongue Every 5 (Five) Minutes As Needed for Chest Pain. Take no more than 3 doses in 15 minutes. 3/26/21  Yes Taiwo Prado APRN   pantoprazole (PROTONIX) 40 MG EC tablet Take 1 tablet by mouth Daily. 6/22/21  Yes Davon Urrutia MD   potassium chloride (K-DUR,KLOR-CON) 20 MEQ CR tablet Take 20 mEq by mouth Daily. 4/12/21  Yes Leila Daly MD   saccharomyces boulardii (Florastor) 250 MG capsule Take 1 capsule by mouth Daily. 7/1/21  Yes Davon Urrutia MD   sucralfate (CARAFATE) 1 g tablet Take 1 g by mouth 4 (Four) Times a Day Before Meals & at Bedtime.   Yes Leila Daly, MD   traMADol (ULTRAM) 50 MG tablet Take 50 mg by mouth 3 (Three) Times a Day.   Yes  Provider, MD Leila   valACYclovir (VALTREX) 500 MG tablet Take 1 tablet by mouth Daily. Indications: chronic suppressive therapy 6/22/21  Yes Davon Urrutia MD   isosorbide mononitrate (IMDUR) 60 MG 24 hr tablet Take 1 tablet by mouth 2 (Two) Times a Day. 3/26/21   Taiwo Prado APRN   levothyroxine (SYNTHROID, LEVOTHROID) 75 MCG tablet Take 1 tablet by mouth Daily. 6/22/21   Davon Urrutia MD       Allergies:  Allergies   Allergen Reactions   • Atorvastatin Other (See Comments)     LEG CRAMPS     • Amoxicillin Rash   • Escitalopram Rash   • Nabumetone Rash   • Niacin Er Rash   • Penicillins Rash   • Simvastatin Rash       Objective     Vital Signs:   /67 (BP Location: Right arm, Patient Position: Lying)   Pulse 62   Temp 94.4 °F (34.7 °C) (Axillary)   Resp 14   LMP  (LMP Unknown)   SpO2 98%   Breastfeeding No     Physical Exam  Constitutional:       General: She is not in acute distress.     Appearance: Normal appearance. She is obese.   HENT:      Head: Normocephalic and atraumatic.      Right Ear: External ear normal.      Left Ear: External ear normal.      Nose: Nose normal.      Mouth/Throat:      Mouth: Mucous membranes are moist.      Pharynx: Oropharynx is clear.   Eyes:      General: No scleral icterus.     Extraocular Movements: Extraocular movements intact.      Conjunctiva/sclera: Conjunctivae normal.      Pupils: Pupils are equal, round, and reactive to light.   Cardiovascular:      Rate and Rhythm: Normal rate and regular rhythm.      Pulses: Normal pulses.      Heart sounds: Normal heart sounds. No murmur heard.     Pulmonary:      Effort: Pulmonary effort is normal. No respiratory distress.      Breath sounds: Normal breath sounds.   Abdominal:      General: Abdomen is flat.      Palpations: Abdomen is soft. There is no mass.      Tenderness: There is no abdominal tenderness.   Musculoskeletal:         General: Normal range of motion.      Cervical back: Normal range of  motion and neck supple.      Right lower leg: No edema.      Left lower leg: No edema.   Skin:     General: Skin is warm and dry.      Coloration: Skin is not pale.      Comments: Postoperative dressing overlying the right hip.  A single drain is noted attached to suction with bloody effluent noted.   Neurological:      General: No focal deficit present.      Mental Status: She is alert and oriented to person, place, and time. Mental status is at baseline.      Cranial Nerves: No cranial nerve deficit.   Psychiatric:         Mood and Affect: Mood normal.         Judgment: Judgment normal.           Results Reviewed:  Lab Results (last 24 hours)     Procedure Component Value Units Date/Time    Anaerobic Culture - Tissue, Hip, Right [[id number]] Collected: 09/14/21 0939    Specimen: Tissue from Hip, Right Updated: 09/14/21 1119    Fungus Culture - Tissue, Hip, Right [[id number]] Collected: 09/14/21 0939    Specimen: Tissue from Hip, Right Updated: 09/14/21 1119    Tissue / Bone Culture - Tissue, Hip, Right [[id number]] Collected: 09/14/21 0939    Specimen: Tissue from Hip, Right Updated: 09/14/21 1119          No results found.   I have personally reviewed and interpreted the radiology studies and ECG obtained at time of admission.     Assessment / Plan      Assessment & Plan  Active Hospital Problems    Diagnosis    • **Non-healing surgical wound, right hip    • Open wound of right hip    • Obesity (BMI 30-39.9)    • Hypothyroidism    • Hypertension    • Coronary artery disease      PCI RCA '10, NSTEMI requiring LAD PCI in '13    [facility] 9/3/19 at [facility]: no significant dz; stents patent         PLAN:   Admit to the medical surgical floor  Consult Dr. Turner for continuation of postoperative management  Consult Dr. Hernandez for continuation of cardiac care  Ambulation per Dr. Turner  Resume the bulk of home medications including Eliquis.    Code Status:   Full code     I discussed the patient's findings and my  recommendations with: The patient    Estimated length of stay: Unknown    Electronically signed by Nayan Hale DO, 09/14/21, 15:37 CDT.

## 2021-09-14 NOTE — ANESTHESIA PREPROCEDURE EVALUATION
Anesthesia Evaluation     Patient summary reviewed and Nursing notes reviewed   no history of anesthetic complications:  NPO Solid Status: > 8 hours  NPO Liquid Status: > 8 hours           Airway   Mallampati: I  TM distance: >3 FB  Neck ROM: full  No difficulty expected  Dental - normal exam     Pulmonary    (+) asthma,  Cardiovascular   Exercise tolerance: poor (<4 METS)    Patient on routine beta blocker and Beta blocker given within 24 hours of surgery    (+) pacemaker ([employer]tronic ) pacemaker, hypertension well controlled 2 medications or greater, past MI  >12 months, CAD, cardiac stents (2 stents, most recent 6 years ago) more than 12 months ago dysrhythmias (SSS), DVT (Acute--There is evidence of deep venous thrombosis and superficial 6/23) resolved, hyperlipidemia,     ROS comment: medtronic interrogation report reviewed from 2/9/21  15% a paced        Echo 2/10/21  · Estimated left ventricular EF = 65% Left ventricular systolic function is normal.  · Normal right ventricular cavity size and systolic function noted.  · All valves appear structurally and functionally normal with no evidence of any hemodynamically significant disease.  · There is no evidence of right to left shunt on bubble study.  · There is no evidence of intracardiac mass or thrombus.       ESTEFANI done to eval for valve vegetations which showed none    Neuro/Psych  (+) headaches, syncope, numbness,     GI/Hepatic/Renal/Endo    (+) obesity,   thyroid problem hypothyroidism  (-) liver disease, no renal disease, diabetes    Musculoskeletal     (+) gait problem,       ROS comment: Right hip/leg weakness, s/p glut med repair  Post op she had syncopal episodes x 2, was admitted with wound infection/sepsis  Now diagnosed with cervical stenosis s/p decompression, myelopathy, lumbar discitis  Psoas abscess drained yesterday  Abdominal    Substance History - negative use     OB/GYN          Other   arthritis (rheumatoid arthritis), blood dyscrasia  anemia,       Other Comment: Sjogren's                   Anesthesia Plan    ASA 3     general     intravenous induction     Anesthetic plan, all risks, benefits, and alternatives have been provided, discussed and informed consent has been obtained with: patient.

## 2021-09-14 NOTE — H&P
Patient seen and examined.  No interval changes. Will proceed as planned.    Patient will me admitted postoperatively.  Dr. Nayan Hale will be the admitting physician.

## 2021-09-14 NOTE — OP NOTE
.LOWER EXTREMITY DEBRIDEMENT, FLAP LOWER EXTREMITY  Procedure Report    Patient Name:  Tawny Shin  YOB: 1955    Date of Surgery:  9/14/2021    Attending Surgeon: Amadeo Turner MD     PROCEDURE:  1.  Debridement of chronic right hip wound, 7.5 cm x 3.6 cm x 3.5 cm, sharp, skin/subcutaneous tissue/fascia/scar  2.  Advancement of right TFL myocutaneous flap  3.  Advancement of right gluteal fasciocutaneous flap    PREOPERATIVE DIAGNOSIS:  Chronic right hip wound, 7.5 cm x 3.6 cm x 3.5 cm    POSTOPERATIVE DIAGNOSIS:  Same    ANESTHESIA:  General    INDICATION FOR PROCEDURE:  This is a 66-year-old female with a long-standing right hip wound following an infection after an orthopedic procedure in January of this year.  We will proceed with debridement and advancement flap closure.    OPERATIVE FINDINGS:  The wound was very clean at the onset of the procedure.  The wound margins were stained with methylene blue and entirely excised. Following excision of the wound bursa, tissue cultures were sent to the lab.  Wound coverage was achieved with advancement of a TFL myocutaneous flap posteriorly and a right gluteal fasciocutaneous flap anteriorly. The TFL was heavily scarred and partially absent.  I was still able to advance the muscle belly over the greater trochanter.    DESCRIPTION OF PROCEDURE:  The patient was transferred to the operating room and placed supine on the OR table.  General anesthesia was induced followed by endotracheal intubation.  The patient was then transferred to the left lateral decubitus position.  Positioning was maintained with a beanbag.  The patient was prepped and draped in sterile fashion.    I began by staining the wound bursa with methylene blue.  I then performed sharp debridement of the entirety of the wound lining.  Following debridement, I performed additional sharp excision of poorly perfused tissue margins.  This included scar and fascia.  I also sent tissue  cultures after removing the exposed wound lining.  Next I freshened dermal margins sharply to bleeding dermis.  I then developed a plane underneath the remaining tensor fascia rodney.  I left the TFL pedicle approximately and did not disinsert fascia distally. I was able to advance this as a bucket-handle style flap over the trochanter of the femur.  I then turned my attention to developing the gluteal fasciocutaneous flap.  This was elevated approximately 10 cm deep and the full width of the wound.  Margins of both flaps demonstrated good bleeding.  Next I copiously irrigated the wound with vancomycin irrigation.  I also performed hemostasis.    A single 15 [place] GUILLERMO drain was placed into the wound and secured to the skin with a single nylon suture.  I then began a multilayered closure sewing the TFL to the lateral margin of the gluteus With 3-0 Monocryl.  Next the deep fascial layer of the gluteal advancement flap was sewn to the fascia/scar overlying TFL. I then performed a superficial fascia to superficial fascia closure of both flaps.  Next I performed a running deep dermal closure with 4-0 Monocryl.  Skin was approximated with staples.  A nylon dressing was fashioned with Xeroflo, Kerlix, and Medipore tape.    BLOOD LOSS:   30 mL    CRYSTALLOID:  1.2 L     BLOOD PRODUCT:  1 Unit PRBC    COMPLICATIONS:   None    SPECIMENS:          Wound tissue culture    IMPLANTS:    Nothing was implanted during the procedure    DISPOSITION:   Patient will be admitted under the care of Dr. Nayan Hale.  I will continue to follow the patient throughout the entirety of her admission.    STAFF:  Surgeon(s):  Amadeo Turner MD           Electronically signed by Amadeo Turner MD, 09/14/21, 11:25 AM CDT.

## 2021-09-14 NOTE — ANESTHESIA POSTPROCEDURE EVALUATION
Patient: Tawny Shin    Procedure Summary     Date: 09/14/21 Room / [facility]:  [facility]  [facility]    Anesthesia Start: 0825 Anesthesia Stop: 1108    Procedures:       DEBRIDEMENT OF RIGHT HIP WOUND, RIGHT GLUTEAL FASCIOCUTANEOUS ADVANCEMENT FLAP AND RIGHT TENSOR FASCIAL JESSICA FLAP (Right Hip)      RIGHT GLUTEAL FASCIOCUTANEOUS ADVANCEMENT FLAP AND RIGHT TENSOR FASCIAL JESSICA FLAP (Right ) Diagnosis: (CHRONIC RIGHT HIP WOUND)    Surgeons: Amadeo Turner MD Provider: Garcia Mendiola CRNA    Anesthesia Type: general ASA Status: 3          Anesthesia Type: general    Vitals  Vitals Value Taken Time   /66 09/14/21 1149   Temp 98 °F (36.7 °C) 09/14/21 1152   Pulse 60 09/14/21 1152   Resp 15 09/14/21 1147   SpO2 99 % 09/14/21 1152           Post Anesthesia Care and Evaluation    Patient location during evaluation: PACU  Patient participation: complete - patient participated  Level of consciousness: awake and alert  Pain management: adequate  Airway patency: patent  Anesthetic complications: No anesthetic complications    Cardiovascular status: acceptable  Respiratory status: acceptable  Hydration status: acceptable    Comments: Blood pressure 145/56, pulse 59, temperature 97.4 °F (36.3 °C), temperature source Axillary, resp. rate 14, SpO2 100 %, not currently breastfeeding.    Pt discharged from PACU based on tyler score >8

## 2021-09-14 NOTE — PLAN OF CARE
Goal Outcome Evaluation:  Plan of Care Reviewed With: patient, daughter        Progress: improving  Outcome Summary: Patient was admitted to 364 from PACU following an debridement of a right hip , the patient has a history with surgeries to the hip , today she has a bulky dressing to right hip that is dry and intact and 1 GUILLERMO drain with bloody drainage Patient is alert and oriented and no complaints of pain , 2 - IV present to lt arm infusing , Patient will be placed on fall precautions with bed alarm related to weakness , safety maintained.

## 2021-09-15 LAB
ALBUMIN SERPL-MCNC: 3.2 G/DL (ref 3.5–5.2)
ALBUMIN/GLOB SERPL: 1.1 G/DL
ALP SERPL-CCNC: 148 U/L (ref 39–117)
ALT SERPL W P-5'-P-CCNC: 13 U/L (ref 1–33)
ANION GAP SERPL CALCULATED.3IONS-SCNC: 10 MMOL/L (ref 5–15)
AST SERPL-CCNC: 21 U/L (ref 1–32)
BASOPHILS # BLD AUTO: 0.01 10*3/MM3 (ref 0–0.2)
BASOPHILS NFR BLD AUTO: 0.1 % (ref 0–1.5)
BH BB BLOOD EXPIRATION DATE: NORMAL
BH BB BLOOD TYPE BARCODE: 6200
BH BB DISPENSE STATUS: NORMAL
BH BB PRODUCT CODE: NORMAL
BH BB UNIT NUMBER: NORMAL
BILIRUB SERPL-MCNC: 0.4 MG/DL (ref 0–1.2)
BUN SERPL-MCNC: 13 MG/DL (ref 8–23)
BUN/CREAT SERPL: 19.1 (ref 7–25)
CALCIUM SPEC-SCNC: 8.8 MG/DL (ref 8.6–10.5)
CHLORIDE SERPL-SCNC: 108 MMOL/L (ref 98–107)
CO2 SERPL-SCNC: 23 MMOL/L (ref 22–29)
CREAT SERPL-MCNC: 0.68 MG/DL (ref 0.57–1)
CROSSMATCH INTERPRETATION: NORMAL
DEPRECATED RDW RBC AUTO: 49.9 FL (ref 37–54)
EOSINOPHIL # BLD AUTO: 0 10*3/MM3 (ref 0–0.4)
EOSINOPHIL NFR BLD AUTO: 0 % (ref 0.3–6.2)
ERYTHROCYTE [DISTWIDTH] IN BLOOD BY AUTOMATED COUNT: 15.6 % (ref 12.3–15.4)
GFR SERPL CREATININE-BSD FRML MDRD: 86 ML/MIN/1.73
GLOBULIN UR ELPH-MCNC: 3 GM/DL
GLUCOSE SERPL-MCNC: 115 MG/DL (ref 65–99)
HCT VFR BLD AUTO: 30.5 % (ref 34–46.6)
HGB BLD-MCNC: 9.6 G/DL (ref 12–15.9)
IMM GRANULOCYTES # BLD AUTO: 0.03 10*3/MM3 (ref 0–0.05)
IMM GRANULOCYTES NFR BLD AUTO: 0.4 % (ref 0–0.5)
LYMPHOCYTES # BLD AUTO: 0.75 10*3/MM3 (ref 0.7–3.1)
LYMPHOCYTES NFR BLD AUTO: 10.7 % (ref 19.6–45.3)
MCH RBC QN AUTO: 27.7 PG (ref 26.6–33)
MCHC RBC AUTO-ENTMCNC: 31.5 G/DL (ref 31.5–35.7)
MCV RBC AUTO: 87.9 FL (ref 79–97)
MONOCYTES # BLD AUTO: 0.43 10*3/MM3 (ref 0.1–0.9)
MONOCYTES NFR BLD AUTO: 6.2 % (ref 5–12)
NEUTROPHILS NFR BLD AUTO: 5.76 10*3/MM3 (ref 1.7–7)
NEUTROPHILS NFR BLD AUTO: 82.6 % (ref 42.7–76)
NRBC BLD AUTO-RTO: 0 /100 WBC (ref 0–0.2)
PLATELET # BLD AUTO: 228 10*3/MM3 (ref 140–450)
PMV BLD AUTO: 10.1 FL (ref 6–12)
POTASSIUM SERPL-SCNC: 3.9 MMOL/L (ref 3.5–5.2)
PROT SERPL-MCNC: 6.2 G/DL (ref 6–8.5)
RBC # BLD AUTO: 3.47 10*6/MM3 (ref 3.77–5.28)
SODIUM SERPL-SCNC: 141 MMOL/L (ref 136–145)
UNIT  ABO: NORMAL
UNIT  RH: NORMAL
WBC # BLD AUTO: 6.98 10*3/MM3 (ref 3.4–10.8)

## 2021-09-15 PROCEDURE — 85025 COMPLETE CBC W/AUTO DIFF WBC: CPT | Performed by: FAMILY MEDICINE

## 2021-09-15 PROCEDURE — 80053 COMPREHEN METABOLIC PANEL: CPT | Performed by: FAMILY MEDICINE

## 2021-09-15 RX ORDER — ASPIRIN 81 MG/1
81 TABLET ORAL DAILY
Status: DISCONTINUED | OUTPATIENT
Start: 2021-09-15 | End: 2021-09-16 | Stop reason: HOSPADM

## 2021-09-15 RX ADMIN — SUCRALFATE 1 G: 1 TABLET ORAL at 10:59

## 2021-09-15 RX ADMIN — APIXABAN 5 MG: 5 TABLET, FILM COATED ORAL at 08:07

## 2021-09-15 RX ADMIN — VALACYCLOVIR HYDROCHLORIDE 500 MG: 500 TABLET, FILM COATED ORAL at 08:06

## 2021-09-15 RX ADMIN — PANTOPRAZOLE SODIUM 40 MG: 40 TABLET, DELAYED RELEASE ORAL at 05:03

## 2021-09-15 RX ADMIN — DOCUSATE SODIUM 100 MG: 100 CAPSULE ORAL at 08:07

## 2021-09-15 RX ADMIN — APIXABAN 5 MG: 5 TABLET, FILM COATED ORAL at 20:27

## 2021-09-15 RX ADMIN — SODIUM CHLORIDE, PRESERVATIVE FREE 10 ML: 5 INJECTION INTRAVENOUS at 08:08

## 2021-09-15 RX ADMIN — LEVOTHYROXINE SODIUM 75 MCG: 75 TABLET ORAL at 05:03

## 2021-09-15 RX ADMIN — MAGNESIUM GLUCONATE 500 MG ORAL TABLET 400 MG: 500 TABLET ORAL at 08:07

## 2021-09-15 RX ADMIN — ISOSORBIDE MONONITRATE 60 MG: 60 TABLET, EXTENDED RELEASE ORAL at 08:06

## 2021-09-15 RX ADMIN — CARVEDILOL 25 MG: 25 TABLET, FILM COATED ORAL at 08:07

## 2021-09-15 RX ADMIN — HYDROCODONE BITARTRATE AND ACETAMINOPHEN 2 TABLET: 7.5; 325 TABLET ORAL at 15:47

## 2021-09-15 RX ADMIN — DOCUSATE SODIUM 100 MG: 100 CAPSULE ORAL at 20:27

## 2021-09-15 RX ADMIN — SUCRALFATE 1 G: 1 TABLET ORAL at 16:42

## 2021-09-15 RX ADMIN — FUROSEMIDE 40 MG: 40 TABLET ORAL at 08:07

## 2021-09-15 RX ADMIN — POTASSIUM CHLORIDE 20 MEQ: 750 CAPSULE, EXTENDED RELEASE ORAL at 08:06

## 2021-09-15 RX ADMIN — CARVEDILOL 25 MG: 25 TABLET, FILM COATED ORAL at 18:21

## 2021-09-15 RX ADMIN — SODIUM CHLORIDE, PRESERVATIVE FREE 10 ML: 5 INJECTION INTRAVENOUS at 20:27

## 2021-09-15 RX ADMIN — HYDROCODONE BITARTRATE AND ACETAMINOPHEN 1 TABLET: 7.5; 325 TABLET ORAL at 11:47

## 2021-09-15 RX ADMIN — SUCRALFATE 1 G: 1 TABLET ORAL at 20:27

## 2021-09-15 RX ADMIN — ASPIRIN 81 MG: 81 TABLET, COATED ORAL at 08:07

## 2021-09-15 RX ADMIN — Medication 250 MG: at 08:06

## 2021-09-15 RX ADMIN — ISOSORBIDE MONONITRATE 60 MG: 60 TABLET, EXTENDED RELEASE ORAL at 18:21

## 2021-09-15 RX ADMIN — SUCRALFATE 1 G: 1 TABLET ORAL at 08:05

## 2021-09-15 NOTE — NURSING NOTE
Patient is current with [facility] services and services will continue upon hospital discharge if appropriate.

## 2021-09-15 NOTE — CONSULTS
.  [facility]   Consult Note    Patient Name: Tawny Shin  : 1955  MRN: [id number]  Primary Care Physician:  Davon Urrutia MD  Referring Physician: Amadeo Turner MD  Date of admission: 2021    Consults  Subjective   Subjective     Reason for Consult/ Chief Complaint: postop day 1 s/p debridement of flap coverage of right hip wound    History of Present Illness  Tawny Shin is a 66 y.o. female Who is postop day 1 status post debridement and coverage of her chronic right hip wound.  She is doing very well.  She has no pain complaints.  She is ambulating and tolerating a regulardiet.  She is voiding independently.    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   • Eustachian tube dysfunction    • Heart disease    • Herpes simplex    • Hyperlipidemia    • Hypertension    • Hypothyroidism 2020   • Intrinsic asthma 2020   • Knee dislocation    • Labral tear of right hip joint    • Laryngitis sicca    • Laryngitis, chronic    • Left carotid bruit 3/9/2016   • MI (myocardial infarction) (CMS/Formerly [facility])    • Myalgia due to statin 2019   • Osteomyelitis of right femur (CMS/HCC) 2021   • Otorrhea    • Pacemaker 2016   • Primary osteoarthritis of left knee 2020   • Psoriasis vulgaris 2020   • S/P coronary artery stent placement 3/9/2016   • Sensorineural hearing loss    • Seropositive rheumatoid arthritis of  "multiple sites (CMS/Formerly [facility]) 12/27/2019    Overview:  -myochrysine '93-'96 -methotrexate '96--->11/98;r/s  restarted 2/99--> 8/14 (anemia) -sulfasalazine- not effective -penicillamine 6/98-->10/98; no effect -leflunomide 11/98--> - Humira '13-->didn't take - Enbrel 12/14-->3/15- no effect!   Last Assessment & Plan:  - \"aching all over\" because she had to be off her anti-rheumatic drugs for 2 weeks in preparation for her R knee surgery - he   • Sick sinus syndrome (CMS/[facility]ly [facility]) 12/27/2019   • Sjogren's disease (CMS/Formerly [facility])    • Spondylolisthesis of lumbar region 1/17/2018   • Syncope, recurrent 2/8/2021       Past Surgical History:   Procedure Laterality Date   • A-V CARDIAC PACEMAKER INSERTION  2016   • ATRIAL CARDIAC PACEMAKER INSERTION     • CARDIAC CATHETERIZATION     • CATARACT EXTRACTION     • CERVICAL CORPECTOMY N/A 3/3/2021    Procedure: CERVICAL 6 CORPECTOMY WITH TITANIUM CAGE WITH NEURO MONITORING;  Surgeon: Bandar Shea MD;  Location:  [facility] OR;  Service: Neurosurgery;  Laterality: N/A;   • COLONOSCOPY  11/08/2011    One fold in the ascending colon which showed ulcer otherwise normal exam   • COLONOSCOPY  11/12/2004    Normal exam repeat in five years   • CORONARY ANGIOPLASTY WITH STENT PLACEMENT      X 2; 2013 & 2014   • ENDOSCOPY  07/10/2014    Normal exam   • FLAP LEG Right 9/14/2021    Procedure: RIGHT GLUTEAL FASCIOCUTANEOUS ADVANCEMENT FLAP AND RIGHT TENSOR FASCIAL JESSICA FLAP;  Surgeon: Amadeo Turner MD;  Location:  [facility];  Service: Plastics;  Laterality: Right;   • HIP ABDUCTION TENOTOMY BILATERAL Right 1/14/2021    Procedure: RIGHT HIP GLUTEUS MEDLUS / MINIMUS REPAIR, POSSIBLE ACHILLES ALLOGRAFT;  Surgeon: Nino Carlson MD;  Location:  [facility];  Service: Orthopedics;  Laterality: Right;   • INCISION AND DRAINAGE HIP Right 2/9/2021    Procedure: HIP INCISION AND DRAINAGE;  Surgeon: Nino Carlson MD;  Location:  [facility];  Service: Orthopedics;  Laterality: Right;   • INCISION AND DRAINAGE OF " WOUND Right 7/8/2021    Procedure: INCISION AND DRAINAGE WOUND RIGHT HIP;  Surgeon: James Huntley MD;  Location:  [facility];  Service: Orthopedics;  Laterality: Right;   • JOINT REPLACEMENT     • LEG DEBRIDEMENT Right 9/14/2021    Procedure: DEBRIDEMENT OF RIGHT HIP WOUND, RIGHT GLUTEAL FASCIOCUTANEOUS ADVANCEMENT FLAP AND RIGHT TENSOR FASCIAL JESSICA FLAP;  Surgeon: Amadeo Turner MD;  Location:  [facility];  Service: Plastics;  Laterality: Right;   • LUMBAR DISCECTOMY Right 3/23/2021    Procedure: LUMBAR DISCECTOMY MICRO, Lumbar 1/2 right;  Surgeon: Bandar Shea MD;  Location:  [facility];  Service: Neurosurgery;  Laterality: Right;   • LUMBAR FUSION N/A 1/19/2018    Procedure: L3-4,L4-5 DECOMPRESSION, POSTERIOR SPINAL FUSION WITH INSTRUMENTATION;  Surgeon: Fortino Oropeza MD;  Location:  [facility];  Service:    • LUMBAR LAMINECTOMY WITH FUSION Left 1/17/2018    Procedure: LEFT L3-4 L4-5 LATERAL LUMBAR INTERBODY FUSION;  Surgeon: Fortino Oropeza MD;  Location:  [facility];  Service:    • MYRINGOTOMY W/ TUBES  09/04/2014    TUBES NO LONGER IN PLACE   • OTHER SURGICAL HISTORY      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 so hardware was removed and spacers were placed   • REPLACEMENT TOTAL KNEE Right        Family History: Her family history includes Cancer in her mother; Heart disease in her father.     Social History: She  reports that she has never smoked. She has never used smokeless tobacco. She reports that she does not drink alcohol and does not use drugs.    Home Medications:  acetaminophen, apixaban, aspirin, carvedilol, docusate sodium, fluticasone, furosemide, isosorbide mononitrate, levothyroxine, lidocaine, magnesium oxide, multivitamin with minerals, nitroglycerin, pantoprazole, potassium chloride, saccharomyces boulardii, sucralfate, traMADol, and valACYclovir    Allergies:  She is allergic to atorvastatin, amoxicillin, escitalopram, nabumetone, niacin er, penicillins, and  simvastatin.    Objective    Objective     Vitals:      Temp:  [94.4 °F (34.7 °C)-98.4 °F (36.9 °C)] 98 °F (36.7 °C)  Heart Rate:  [58-71] 71  Resp:  [12-16] 16  BP: (115-182)/(56-94) 182/81  Flow (L/min):  [2-10] 2  Output by Drain (mL) 09/14/21 0701 - 09/14/21 1900 09/14/21 1901 - 09/15/21 0700 09/15/21 0701 - 09/15/21 0807 Range Total   Closed/Suction Drain Right;Anterior Thigh Bulb 15 Fr.  50  50       Physical Exam:   Patient is alert and oriented, no acute distress, conversant  Patient exhibits nonlabored breathing on room air, symmetric chest rise  Right hip wound dressing is in place.  It is clean and dry.  Visible skin is well-perfused without ecchymosis.  GUILLERMO drain is in place and holding suction.  Serosanguineous output.           There were no vitals filed for this visit.    Result Review      Result Review:  I have personally reviewed the results from the time of this admission to 9/15/2021 08:07 CDT and agree with these findings:  [x]  Laboratory  []  Microbiology  []  Radiology  []  EKG/Telemetry   []  Cardiology/Vascular   []  Pathology  []  Old records  []  Other:    Most notable findings include: Hgb 9.6  Assessment/Plan     Assessment / Plan     Brief Patient Summary:  Tawny Shin is a 66 y.o. female Who is postop day 1 status post debridement and coverage of her right hip wound.  Hemoglobin suggest no continued bleeding.  This is supported by the appearance of drain output.  Overall patient is doing very well.  Cultures are pending with a negative Gram stain.    Active Hospital Problems:  Active Hospital Problems    Diagnosis    • **Non-healing surgical wound, right hip    • Open wound of right hip    • Obesity (BMI 30-39.9)    • Hypothyroidism    • Hypertension    • Coronary artery disease      PCI RCA '10, NSTEMI requiring LAD PCI in '13    [facility] 9/3/19 at [facility]: no significant dz; stents patent         DVT prophylaxis:  Medical DVT prophylaxis orders are present.      Plan:   Patient should  emphasize oral nutrition, pressure offloading, and ambulation today.  Postop dressings will be removed tomorrow.    Continue to monitor culture results          Electronically signed by Amadeo Turner MD, 09/15/21, 8:07 AM CDT.

## 2021-09-15 NOTE — PLAN OF CARE
Goal Outcome Evaluation:  Plan of Care Reviewed With: patient        Progress: no change  Outcome Summary: Bulky drsg to right hip d/i with GUILLERMO x1 to bulb suction.  IVF.  Voiding.  Up with assist and walker to bathroom.  No c/o pain.  Safety maintained.

## 2021-09-15 NOTE — PLAN OF CARE
Goal Outcome Evaluation:           Progress: improving  Outcome Summary: Dressing dry and intact with GUILLERMO to bulb suction. Pt has ambulated in muro X 2 with walker and stand by assist. C/o throbbing pain this afternoon and 2 norco's given as ordered. Up in chair. IID.

## 2021-09-15 NOTE — PROGRESS NOTES
[facility] Medicine Services  INPATIENT PROGRESS NOTE    Length of Stay: 1  Date of Admission: 9/14/2021  Primary Care Physician: Davon Urrutia MD    Subjective     Chief Complaint:     Mild right hip discomfort    HPI     Patient is doing quite well post surgically today.  Dressing is in place.  The patient was ambulating in the halls when I saw her today.  Dr. Turner has seen and evaluated the patient earlier today and agrees that the patient is doing very well overall.  Drain output has been minimal.  Diet and nutrition are emphasized.  Plan is for possible discharge tomorrow.      Review of Systems     All pertinent negatives and positives are as above. All other systems have been reviewed and are negative unless otherwise stated.     Objective    Temp:  [97.7 °F (36.5 °C)-98.4 °F (36.9 °C)] 98.3 °F (36.8 °C)  Heart Rate:  [58-71] 71  Resp:  [16] 16  BP: (149-182)/(59-81) 149/60    Lab Results (last 24 hours)     Procedure Component Value Units Date/Time    Tissue / Bone Culture - Tissue, Hip, Right [[id number]] Collected: 09/14/21 0939    Specimen: Tissue from Hip, Right Updated: 09/15/21 0848     Tissue Culture No growth     Gram Stain No WBCs or organisms seen    Comprehensive Metabolic Panel [[id number]]  (Abnormal) Collected: 09/15/21 0500    Specimen: Blood Updated: 09/15/21 0553     Glucose 115 mg/dL      BUN 13 mg/dL      Creatinine 0.68 mg/dL      Sodium 141 mmol/L      Potassium 3.9 mmol/L      Chloride 108 mmol/L      CO2 23.0 mmol/L      Calcium 8.8 mg/dL      Total Protein 6.2 g/dL      Albumin 3.20 g/dL      ALT (SGPT) 13 U/L      AST (SGOT) 21 U/L      Alkaline Phosphatase 148 U/L      Total Bilirubin 0.4 mg/dL      eGFR Non African Amer 86 mL/min/1.73      Globulin 3.0 gm/dL      A/G Ratio 1.1 g/dL      BUN/Creatinine Ratio 19.1     Anion Gap 10.0 mmol/L     Narrative:      GFR Normal >60  Chronic Kidney Disease <60  Kidney Failure <15      CBC Auto  Differential [[id number]]  (Abnormal) Collected: 09/15/21 0500    Specimen: Blood Updated: 09/15/21 0529     WBC 6.98 10*3/mm3      RBC 3.47 10*6/mm3      Hemoglobin 9.6 g/dL      Hematocrit 30.5 %      MCV 87.9 fL      MCH 27.7 pg      MCHC 31.5 g/dL      RDW 15.6 %      RDW-SD 49.9 fl      MPV 10.1 fL      Platelets 228 10*3/mm3      Neutrophil % 82.6 %      Lymphocyte % 10.7 %      Monocyte % 6.2 %      Eosinophil % 0.0 %      Basophil % 0.1 %      Immature Grans % 0.4 %      Neutrophils, Absolute 5.76 10*3/mm3      Lymphocytes, Absolute 0.75 10*3/mm3      Monocytes, Absolute 0.43 10*3/mm3      Eosinophils, Absolute 0.00 10*3/mm3      Basophils, Absolute 0.01 10*3/mm3      Immature Grans, Absolute 0.03 10*3/mm3      nRBC 0.0 /100 WBC           Imaging Results (Last 24 Hours)     ** No results found for the last 24 hours. **             Intake/Output Summary (Last 24 hours) at 9/15/2021 1443  Last data filed at 9/15/2021 1400  Gross per 24 hour   Intake 360 ml   Output 1720 ml   Net -1360 ml       Physical Exam  Constitutional:       General: She is not in acute distress.     Appearance: Normal appearance. She is obese.  She is ambulating in the halls using a walker with physical therapy.  HENT:      Head: Normocephalic and atraumatic.      Nose: Nose normal.      Mouth: Mucous membranes are moist.      Pharynx: Oropharynx is clear.   Eyes:      General: No scleral icterus.     Conjunctiva/sclera: Conjunctivae normal.   Cardiovascular:      Rate and Rhythm: Normal rate and regular rhythm.      Pulses: Normal pulses.      Heart sounds: Normal heart sounds. No murmur heard.   Pulmonary:      Effort: Pulmonary effort is normal. No respiratory distress.      Breath sounds: Normal breath sounds.   Abdominal:      General: Abdomen is flat.      Palpations: Abdomen is soft. There is no mass.   Musculoskeletal:         General: Normal range of motion.      Right lower leg: No edema.      Left lower leg: No edema.   Skin:      General: Skin is warm and dry.      Coloration: Skin is not pale.      Comments: Postoperative dressing overlying the right hip.  A single drain is noted attached to suction with bloody effluent noted.   Neurological:      General: No focal deficit present.      Mental Status: She is alert and oriented to person, place, and time. Mental status is at baseline.   Psychiatric:         Mood and Affect: Mood normal.         Judgment: Judgment normal.     Results Review:  I have reviewed the labs, radiology results, and diagnostic studies since my last progress note and made treatment changes reflective of the results.   I have reviewed the current medications.    Assessment/Plan     Active Hospital Problems    Diagnosis    • **Non-healing surgical wound, right hip    • Open wound of right hip    • Obesity (BMI 30-39.9)    • Hypothyroidism    • Hypertension    • Coronary artery disease      PCI RCA '10, NSTEMI requiring LAD PCI in '13    [facility] 9/3/19 at [facility]: no significant dz; stents patent         PLAN:  Continue current postoperative management per Dr. Turner  Appreciate Dr. Hernandez for clarifying required medications  Ambulation and nutrition encouraged.    Electronically signed by Nayan Hale DO, 09/15/21, 14:43 CDT.

## 2021-09-16 ENCOUNTER — READMISSION MANAGEMENT (OUTPATIENT)
Dept: CALL CENTER | Facility: HOSPITAL | Age: 66
End: 2021-09-16

## 2021-09-16 ENCOUNTER — TELEPHONE (OUTPATIENT)
Dept: INTERNAL MEDICINE | Facility: CLINIC | Age: 66
End: 2021-09-16

## 2021-09-16 VITALS
OXYGEN SATURATION: 92 % | WEIGHT: 182.98 LBS | TEMPERATURE: 98.1 F | RESPIRATION RATE: 18 BRPM | SYSTOLIC BLOOD PRESSURE: 132 MMHG | DIASTOLIC BLOOD PRESSURE: 54 MMHG | HEIGHT: 63 IN | BODY MASS INDEX: 32.42 KG/M2 | HEART RATE: 66 BPM

## 2021-09-16 RX ORDER — HYDROCODONE BITARTRATE AND ACETAMINOPHEN 7.5; 325 MG/1; MG/1
1 TABLET ORAL EVERY 4 HOURS PRN
Qty: 12 TABLET | Refills: 0 | Status: SHIPPED | OUTPATIENT
Start: 2021-09-16 | End: 2021-09-21

## 2021-09-16 RX ORDER — ASPIRIN 81 MG/1
81 TABLET ORAL DAILY
Qty: 100 TABLET | Refills: 99 | Status: ON HOLD
Start: 2021-09-16 | End: 2021-11-04

## 2021-09-16 RX ADMIN — SUCRALFATE 1 G: 1 TABLET ORAL at 07:56

## 2021-09-16 RX ADMIN — VALACYCLOVIR HYDROCHLORIDE 500 MG: 500 TABLET, FILM COATED ORAL at 09:44

## 2021-09-16 RX ADMIN — ISOSORBIDE MONONITRATE 60 MG: 60 TABLET, EXTENDED RELEASE ORAL at 07:56

## 2021-09-16 RX ADMIN — LEVOTHYROXINE SODIUM 75 MCG: 75 TABLET ORAL at 05:24

## 2021-09-16 RX ADMIN — CARVEDILOL 25 MG: 25 TABLET, FILM COATED ORAL at 07:56

## 2021-09-16 RX ADMIN — SODIUM CHLORIDE, PRESERVATIVE FREE 10 ML: 5 INJECTION INTRAVENOUS at 09:45

## 2021-09-16 RX ADMIN — HYDROCODONE BITARTRATE AND ACETAMINOPHEN 2 TABLET: 7.5; 325 TABLET ORAL at 06:33

## 2021-09-16 RX ADMIN — APIXABAN 5 MG: 5 TABLET, FILM COATED ORAL at 09:44

## 2021-09-16 RX ADMIN — ASPIRIN 81 MG: 81 TABLET, COATED ORAL at 09:44

## 2021-09-16 RX ADMIN — FUROSEMIDE 40 MG: 40 TABLET ORAL at 09:43

## 2021-09-16 RX ADMIN — MAGNESIUM GLUCONATE 500 MG ORAL TABLET 400 MG: 500 TABLET ORAL at 09:44

## 2021-09-16 RX ADMIN — DOCUSATE SODIUM 100 MG: 100 CAPSULE ORAL at 09:44

## 2021-09-16 RX ADMIN — PANTOPRAZOLE SODIUM 40 MG: 40 TABLET, DELAYED RELEASE ORAL at 05:24

## 2021-09-16 RX ADMIN — POTASSIUM CHLORIDE 20 MEQ: 750 CAPSULE, EXTENDED RELEASE ORAL at 09:44

## 2021-09-16 RX ADMIN — Medication 250 MG: at 09:44

## 2021-09-16 NOTE — PROGRESS NOTES
.  [facility]   Progress Note    Patient Name: Tawny Shin  : 1955  MRN: [id number]  Primary Care Physician:  Davon Urrutia MD  Date of admission: 2021    Subjective   Subjective     Chief Complaint: Postoperative state    History of Present Illness  Patient Reports Some increased discomfort after ambulating but overall adequate pain control.  Good ambulation yesterday.  Good nutrition intake.        Objective   Objective     Vitals:   Temp:  [97.6 °F (36.4 °C)-98.4 °F (36.9 °C)] 98.1 °F (36.7 °C)  Heart Rate:  [63-71] 66  Resp:  [16-18] 18  BP: (132-149)/(51-60) 132/54    Physical Exam:   Postoperative dressings were removed.  Incision is clean, dry, and intact.  All skin margins appear well perfused.  No evidence of infection.  No evidence of fluid collection.  Drain site clean and dry, serosanguineous output.    Result Review    Result Review:  I have personally reviewed the results from the time of this admission to 2021 09:19 CDT and agree with these findings:  []  Laboratory  []  Microbiology  []  Radiology  []  EKG/Telemetry   []  Cardiology/Vascular   []  Pathology  []  Old records  []  Other:  Assessment/Plan   Assessment / Plan     Brief Patient Summary:  Tawny Shin is a 66 y.o. female who Postop day 2 status post debridement of chronic right hip wound followed by flap closure.  She is recovering very well.  She is doing a great job with ambulation and oral intake.  Her operative site has no evidence of complication.  Intra-Op tissue cultures demonstrated no growth. Drain output is serosanguineous and decreasing.    Active Hospital Problems:  Active Hospital Problems    Diagnosis    • **Non-healing surgical wound, right hip    • Open wound of right hip    • Obesity (BMI 30-39.9)    • Hypothyroidism    • Hypertension    • Coronary artery disease      PCI RCA '10, NSTEMI requiring LAD PCI in '13    [facility] 9/3/19 at [facility]: no significant dz; stents patent         DVT  prophylaxis:  Medical DVT prophylaxis orders are present.    CODE STATUS:    Level Of Support Discussed With: Patient  Code Status: CPR  Medical Interventions (Level of Support Prior to Arrest): Full      Plan:   1. Patient will place ABDs over her incision.  These can be changed as needed for the next 48 hours.  She can then leave the dressing open to air.  2.  Patient was instructed to change drain sponge daily  3.  Patient was instructed to monitor drain output and stripped drains routinely at home.  4.  Patient is suitable for discharge from my perspective.  She will return to clinic on Monday for outpatient follow-up.      Electronically signed by Amadeo Turner MD, 09/16/21, 9:19 AM CDT.

## 2021-09-16 NOTE — OUTREACH NOTE
Prep Survey      Responses   [facility] patient discharged from?  [place]   Is LACE score < 7 ?  No   Emergency Room discharge w/ pulse ox?  No   Eligibility  [facility]   Date of Admission  09/14/21   Date of Discharge  09/16/21   Discharge Disposition  Home-Health Care Sv   Discharge diagnosis  Open wound of right hip, s/p Debridement of chronic right hip wound   Does the patient have one of the following disease processes/diagnoses(primary or secondary)?  General Surgery   Does the patient have Home health ordered?  Yes   What is the Home health agency?   [facility] for [facility] care and drain care   Is there a DME ordered?  No   Prep survey completed?  Yes          Amber Alvarez RN

## 2021-09-16 NOTE — DISCHARGE SUMMARY
[facility] Medicine Services  DISCHARGE SUMMARY       Date of Admission: 9/14/2021  Date of Discharge:  9/16/2021  Primary Care Physician: Davon Urrutia MD    Discharge Diagnoses:  Active Hospital Problems    Diagnosis    • **Non-healing surgical wound, right hip    • Open wound of right hip    • Obesity (BMI 30-39.9)    • Hypothyroidism    • Hypertension    • Coronary artery disease      PCI RCA '10, NSTEMI requiring LAD PCI in '13    [facility] 9/3/19 at [facility]: no significant dz; stents patent           Presenting Problem/History of Present Illness:  Open wound of right hip [S71.001A]     Chief Complaint on Day of Discharge:   No complaints    History of Present Illness on Day of Discharge:   The patient is doing extremely well today with ambulation and nutrition.  Dr. Turner has seen and evaluated the patient and agrees that she is stable for discharge home with continued home wound care.  He has given the patient instructions and wound care and drain care.  She is stable for discharge and will be followed up in his office on Monday.    Hospital Course   This this 66-year-old female is admitted postoperatively after a surgical procedure performed by Dr. Turner which included debridement of chronic right hip wound with advancement of right T FL myocutaneous flap and right gluteal fasciocutaneous flap.  She received 1 unit of PRBCs intraoperatively as she was noted to have a hemoglobin of 8.2 preoperatively.  She is doing quite well postoperatively and is alert, talkative and interactive.  The patient has a significant cardiac history as well.  Dr. Turner will be consulted for continuation of postoperative care.  Dr. Hernandez will be consulted for continuity of care for cardiac disease.  PLAN:   Admit to the medical surgical floor  Consult Dr. Turner for continuation of postoperative management  Consult Dr. Hernandez for continuation of cardiac care  Ambulation per   Yue  Resume the bulk of home medications including Eliquis.    The patient's hospital stay remained uneventful postoperatively.  She is medically stable.  She has done well with nutritional support and with ambulation.  Dr. Turner has seen and evaluated the patient this morning and agrees that she is stable for discharge home with continued home wound care.  He has given the patient instructions on wound care and drain care.  She is stable for discharge and will be followed up in his office on Monday    Procedures Performed:   1.  Debridement of chronic right hip wound, 7.5 cm x 3.6 cm x 3.5 cm, sharp, skin/subcutaneous tissue/fascia/scar  2.  Advancement of right TFL myocutaneous flap  3.  Advancement of right gluteal fasciocutaneous flap    Consults:   Cardiology:  Assessment/Plan         1.  Chronic right hip wound status post debridement with advancement of right myocutaneous flap and right gluteal fasciocutaneous flap 9/14 per Dr. Turner      2.  Left lower extremity DVT diagnosed 6/23/2021-anticoagulated with Eliquis per admitting team     3.  Coronary artery disease: Stable, no angina.  -Continue aspirin 81 mg daily indefinitely without interruption given her history of prior stent placement.  -Continue Coreg, Imdur at current doses  -She states she has been without her Repatha due to cost/coverage issues-will work on getting this covered and resumed as an outpatient  -She has no documented history of CHF, but she states she does take Lasix chronically to prevent recurrent leg swelling-stable     4.  Sick sinus syndrome with pacemaker in place: With dual-chamber pacemaker in place.  I did review her most recent device interrogation on 7/8/2021.  Normal device function, satisfactory battery life, 25% right atrial pacing, 0.03% right ventricular pacing.  0% AT/AF.  No arrhythmia events noted.     5.  Rheumatoid arthritis  6.  History of cervical stenosis status post cervical spinal surgery  7.  History of  discitis/osteomyelitis L1/L2  8.  History of MSSA bacteremia in March 2021  9.  Anemia    Plastic surgery:  Active Hospital Problems:        Active Hospital Problems     Diagnosis     • **Non-healing surgical wound, right hip     • Open wound of right hip     • Obesity (BMI 30-39.9)     • Hypothyroidism     • Hypertension     • Coronary artery disease         PCI RCA '10, NSTEMI requiring LAD PCI in '13     [facility] 9/3/19 at [place]: no significant dz; stents patent            DVT prophylaxis:  Medical DVT prophylaxis orders are present.       Plan:   Patient should emphasize oral nutrition, pressure offloading, and ambulation today.  Postop dressings will be removed tomorrow.    Continue to monitor culture results              Electronically signed by Amadeo Turner MD      Pertinent Test Results:   Lab Results (last 7 days)     Procedure Component Value Units Date/Time    Tissue / Bone Culture - Tissue, Hip, Right [[id number]] Collected: 09/14/21 0939    Specimen: Tissue from Hip, Right Updated: 09/15/21 0848     Tissue Culture No growth     Gram Stain No WBCs or organisms seen    Comprehensive Metabolic Panel [[id number]]  (Abnormal) Collected: 09/15/21 0500    Specimen: Blood Updated: 09/15/21 0553     Glucose 115 mg/dL      BUN 13 mg/dL      Creatinine 0.68 mg/dL      Sodium 141 mmol/L      Potassium 3.9 mmol/L      Chloride 108 mmol/L      CO2 23.0 mmol/L      Calcium 8.8 mg/dL      Total Protein 6.2 g/dL      Albumin 3.20 g/dL      ALT (SGPT) 13 U/L      AST (SGOT) 21 U/L      Alkaline Phosphatase 148 U/L      Total Bilirubin 0.4 mg/dL      eGFR Non African Amer 86 mL/min/1.73      Globulin 3.0 gm/dL      A/G Ratio 1.1 g/dL      BUN/Creatinine Ratio 19.1     Anion Gap 10.0 mmol/L     Narrative:      GFR Normal >60  Chronic Kidney Disease <60  Kidney Failure <15      CBC Auto Differential [[id number]]  (Abnormal) Collected: 09/15/21 0500    Specimen: Blood Updated: 09/15/21 0529     WBC 6.98 10*3/mm3      RBC 3.47  "10*6/mm3      Hemoglobin 9.6 g/dL      Hematocrit 30.5 %      MCV 87.9 fL      MCH 27.7 pg      MCHC 31.5 g/dL      RDW 15.6 %      RDW-SD 49.9 fl      MPV 10.1 fL      Platelets 228 10*3/mm3      Neutrophil % 82.6 %      Lymphocyte % 10.7 %      Monocyte % 6.2 %      Eosinophil % 0.0 %      Basophil % 0.1 %      Immature Grans % 0.4 %      Neutrophils, Absolute 5.76 10*3/mm3      Lymphocytes, Absolute 0.75 10*3/mm3      Monocytes, Absolute 0.43 10*3/mm3      Eosinophils, Absolute 0.00 10*3/mm3      Basophils, Absolute 0.01 10*3/mm3      Immature Grans, Absolute 0.03 10*3/mm3      nRBC 0.0 /100 WBC     Anaerobic Culture - Tissue, Hip, Right [[id number]] Collected: 09/14/21 0939    Specimen: Tissue from Hip, Right Updated: 09/14/21 1119    Fungus Culture - Tissue, Hip, Right [[id number]] Collected: 09/14/21 0939    Specimen: Tissue from Hip, Right Updated: 09/14/21 1119          Condition on Discharge:    Stable    Physical Exam on Discharge:  /54 (BP Location: Right arm, Patient Position: Lying)   Pulse 66   Temp 98.1 °F (36.7 °C) (Oral)   Resp 18   Ht 161 cm (63.39\")   Wt 83 kg (182 lb 15.7 oz)   LMP  (LMP Unknown)   SpO2 92%   Breastfeeding No   BMI 32.02 kg/m²   Physical Exam     Constitutional:       General: She is not in acute distress.     Appearance: Normal appearance. She is obese.  She is ambulating in the halls using a walker with physical therapy.  HENT:      Head: Normocephalic and atraumatic.      Nose: Nose normal.      Mouth: Mucous membranes are moist.      Pharynx: Oropharynx is clear.   Eyes:      General: No scleral icterus.     Conjunctiva/sclera: Conjunctivae normal.   Cardiovascular:      Rate and Rhythm: Normal rate and regular rhythm.      Pulses: Normal pulses.      Heart sounds: Normal heart sounds. No murmur heard.   Pulmonary:      Effort: Pulmonary effort is normal. No respiratory distress.      Breath sounds: Normal breath sounds.   Abdominal:      General: Abdomen is " flat.      Palpations: Abdomen is soft. There is no mass.   Musculoskeletal:         General: Normal range of motion.      Right lower leg: No edema.      Left lower leg: No edema.   Skin:     General: Skin is warm and dry.      Coloration: Skin is not pale.      Comments: Postoperative dressing overlying the right hip.  A single drain is noted attached to suction with bloody effluent noted.   Neurological:      General: No focal deficit present.      Mental Status: She is alert and oriented to person, place, and time. Mental status is at baseline.   Psychiatric:         Mood and Affect: Mood normal    Discharge Disposition:  Home or Self Care    Discharge Medications:     Discharge Medications      New Medications      Instructions Start Date   HYDROcodone-acetaminophen 7.5-325 MG per tablet  Commonly known as: NORCO   1 tablet, Oral, Every 4 Hours PRN         Changes to Medications      Instructions Start Date   aspirin 81 MG EC tablet  What changed: when to take this   81 mg, Oral, Daily         Continue These Medications      Instructions Start Date   acetaminophen 325 MG tablet  Commonly known as: TYLENOL   650 mg, Oral, Every 4 Hours PRN      apixaban 5 MG tablet tablet  Commonly known as: ELIQUIS   1 twice daily      carvedilol 25 MG tablet  Commonly known as: COREG   25 mg, Oral, 2 Times Daily With Meals      docusate sodium 100 MG capsule   100 mg, Oral, 2 Times Daily      fluticasone 50 MCG/ACT nasal spray  Commonly known as: FLONASE   1 spray, Nasal, Daily PRN      furosemide 40 MG tablet  Commonly known as: LASIX   40 mg, Oral, Daily      isosorbide mononitrate 60 MG 24 hr tablet  Commonly known as: IMDUR   60 mg, Oral, 2 Times Daily      levothyroxine 75 MCG tablet  Commonly known as: SYNTHROID, LEVOTHROID   75 mcg, Oral, Daily      lidocaine 5 %  Commonly known as: LIDODERM   1 patch, Transdermal, Daily PRN, Remove & Discard patch within 12 hours or as directed by MD      magnesium oxide 400 MG  tablet  Commonly known as: MAG-OX   400 mg, Oral, Daily      multivitamin with minerals tablet tablet   1 tablet, Oral, Daily      nitroglycerin 0.4 MG SL tablet  Commonly known as: Nitrostat   0.4 mg, Sublingual, Every 5 Minutes PRN, Take no more than 3 doses in 15 minutes.      pantoprazole 40 MG EC tablet  Commonly known as: PROTONIX   40 mg, Oral, Daily      potassium chloride 20 MEQ CR tablet  Commonly known as: K-DUR,KLOR-CON   20 mEq, Oral, Daily      saccharomyces boulardii 250 MG capsule  Commonly known as: Florastor   250 mg, Oral, Daily      sucralfate 1 g tablet  Commonly known as: CARAFATE   1 g, Oral, 4 Times Daily Before Meals & Nightly      traMADol 50 MG tablet  Commonly known as: ULTRAM   50 mg, Oral, 3 Times Daily      valACYclovir 500 MG tablet  Commonly known as: VALTREX   500 mg, Oral, Every 24 Hours Scheduled             Discharge Diet:   Diet Instructions     Diet: Regular; Thin      Discharge Diet: Regular    Fluid Consistency: Thin          Discharge Care Plan / Instructions:   Discharge home    Activity at Discharge:   Activity Instructions     Activity as Tolerated            Follow-up Appointments:  Follow-up with Dr. Turner Monday       Electronically signed by Nayan Hale DO, 09/16/21, 10:05 CDT.    Time: Discharge Less than 30 min    Part of this note may be an electronic transcription/translation of spoken language to printed text using the Dragon Dictation system.

## 2021-09-16 NOTE — CASE MANAGEMENT/SOCIAL WORK
Discharge Planning Assessment  [facility]     Patient Name: Tawny Shin  MRN: [id number]  Today's Date: 9/16/2021    Admit Date: 9/14/2021    Discharge Needs Assessment     Row Name 09/16/21 1123       Living Environment    Lives With  alone    Current Living Arrangements  home/apartment/condo    Primary Care Provided by  self    Provides Primary Care For  no one    Family Caregiver if Needed  child(mary jo), adult    Quality of Family Relationships  helpful;involved;supportive    Able to Return to Prior Arrangements  yes       Resource/Environmental Concerns    Resource/Environmental Concerns  none       Transition Planning    Patient/Family Anticipates Transition to  home with help/services    Patient/Family Anticipated Services at Transition  home health care    Transportation Anticipated  family or friend will provide       Discharge Needs Assessment    Readmission Within the Last 30 Days  no previous admission in last 30 days    Current Outpatient/Agency/Support Group  homecare agency    Equipment Currently Used at Home  walker, rolling;walker, maggie;shower chair;grab bar    Anticipated Changes Related to Illness  none    Equipment Needed After Discharge  none    Outpatient/Agency/Support Group Needs  homecare agency    Discharge Facility/Level of Care Needs  home with home health    Discharge Coordination/Progress  Pt live at home alone and will go home today. She is current with [facility]. Spoke with Joy [phone] and they are aware of d/c.        Discharge Plan     Row Name 09/16/21 1124       Plan    Final Discharge Disposition Code  06 - home with home health care        Continued Care and Services - Admitted Since 9/14/2021     Home Medical Care     Service Provider Request Status Selected Services Address Phone Fax Patient Preferred    formerly [facility]  Accepted N/A [address] , [facility] [address] [phone] --            Selected Continued Care - Episodes Includes selections from  active Coordinated Care Management episodes    High Risk Care Management Episode start date: 7/14/2021   There are no active outsourced providers for this episode.             Selected Continued Care - Prior Encounters Includes selections from prior encounters from 6/16/2021 to 9/16/2021    Discharged on 7/13/2021 Admission date: 7/6/2021 - Discharge disposition: Skilled Nursing Facility (DC - External)    Destination     Service Provider Selected Services Address Phone Fax Patient Preferred    [facility] [address] [facility] [address] [phone] --                    Expected Discharge Date and Time     Expected Discharge Date Expected Discharge Time    Sep 16, 2021         Demographic Summary    No documentation.       Functional Status    No documentation.       Psychosocial    No documentation.       Abuse/Neglect    No documentation.       Legal    No documentation.       Substance Abuse    No documentation.       Patient Forms    No documentation.           BRIAN Anderson

## 2021-09-16 NOTE — TELEPHONE ENCOUNTER
Joy from  home health stated pt is getting d/c today and wants an ok to resume home health. She can be reached at [phone]

## 2021-09-16 NOTE — TELEPHONE ENCOUNTER
Gave ok to Joy to resume home health but wound care orders will have to come from Dr. Turner, her plastic surgeon.

## 2021-09-16 NOTE — PLAN OF CARE
Goal Outcome Evaluation:  Plan of Care Reviewed With: patient        Progress: no change  Outcome Summary: Dressing to right hip d/i with GUILLERMO to bulb suction.  No c/o pain so far this shift.  Up to bathroom with assist and walker.  IID.

## 2021-09-16 NOTE — DISCHARGE INSTRUCTIONS
Cover incisions with ABD pads for the next 48 hours.  Change as needed.  Then can leave open to air    Change drain sponge daily

## 2021-09-17 ENCOUNTER — TRANSITIONAL CARE MANAGEMENT TELEPHONE ENCOUNTER (OUTPATIENT)
Dept: CALL CENTER | Facility: HOSPITAL | Age: 66
End: 2021-09-17

## 2021-09-17 LAB
BACTERIA SPEC AEROBE CULT: NORMAL
BH BB BLOOD EXPIRATION DATE: NORMAL
BH BB BLOOD TYPE BARCODE: 6200
BH BB DISPENSE STATUS: NORMAL
BH BB PRODUCT CODE: NORMAL
BH BB UNIT NUMBER: NORMAL
CROSSMATCH INTERPRETATION: NORMAL
GRAM STN SPEC: NORMAL
UNIT  ABO: NORMAL
UNIT  RH: NORMAL

## 2021-09-17 NOTE — OUTREACH NOTE
Call Center TCM Note      Responses   [facility] patient discharged from?  [place]   Does the patient have one of the following disease processes/diagnoses(primary or secondary)?  General Surgery   TCM attempt successful?  Yes   Call start time  1615   Call end time  1617   Discharge diagnosis  Open wound of right hip, s/p Debridement of chronic right hip wound   Meds reviewed with patient/caregiver?  Yes   Is the patient having any side effects they believe may be caused by any medication additions or changes?  No   Does the patient have all medications related to this admission filled (includes all antibiotics, pain medications, etc.)  Yes   Is the patient taking all medications as directed (includes completed medication regime)?  Yes   Does the patient have a follow up appointment scheduled with their surgeon?  Yes   Has the patient kept scheduled appointments due by today?  N/A   Comments  f/u with surgeon on 9/20   What is the Home health agency?    Home Health for wound care and drain care   Has home health visited the patient within 72 hours of discharge?  Call prior to 72 hours   Psychosocial issues?  No   Did the patient receive a copy of their discharge instructions?  Yes   Nursing interventions  Reviewed instructions with patient   What is the patient's perception of their health status since discharge?  Improving   Nursing interventions  Nurse provided patient education   Is the patient /caregiver able to teach back basic post-op care?  Lifting as instructed by MD in discharge instructions, Do not remove steri-strips, Keep incision areas clean,dry and protected, No tub bath, swimming, or hot tub until instructed by MD, Take showers only when approved by MD-sponge bathe until then   Is the patient/caregiver able to teach back signs and symptoms of incisional infection?  Fever   Is the patient/caregiver able to teach back the hierarchy of who to call/visit for symptoms/problems? PCP, Specialist,  Home health nurse, Urgent Care, ED, 911  Yes   TCM call completed?  Yes   Wrap up additional comments  Says she is doing well, no questions or concerns at this time, states she will be following up with her surgeon on 9/20.          Radha Perez RN    9/17/2021, 16:17 EDT

## 2021-09-17 NOTE — OUTREACH NOTE
Call Center TCM Note      Responses   [facility] patient discharged from?  [place]   Does the patient have one of the following disease processes/diagnoses(primary or secondary)?  General Surgery   TCM attempt successful?  No   Unsuccessful attempts  Attempt 1          Radha Perez RN    9/17/2021, 15:53 EDT

## 2021-09-19 ENCOUNTER — HOME CARE VISIT (OUTPATIENT)
Dept: HOME HEALTH SERVICES | Facility: HOME HEALTHCARE | Age: 66
End: 2021-09-19

## 2021-09-19 ENCOUNTER — HOME CARE VISIT (OUTPATIENT)
Dept: HOME HEALTH SERVICES | Facility: CLINIC | Age: 66
End: 2021-09-19

## 2021-09-19 VITALS
SYSTOLIC BLOOD PRESSURE: 128 MMHG | OXYGEN SATURATION: 99 % | HEART RATE: 78 BPM | RESPIRATION RATE: 18 BRPM | TEMPERATURE: 98.2 F | DIASTOLIC BLOOD PRESSURE: 72 MMHG

## 2021-09-19 LAB — BACTERIA SPEC ANAEROBE CULT: NORMAL

## 2021-09-19 PROCEDURE — G0299 HHS/HOSPICE OF RN EA 15 MIN: HCPCS

## 2021-09-20 NOTE — HOME HEALTH
Patient has returned to her new home following an inpatient surgery to repair her right hip wound. Incision without s/sx of infection. 2 drains present. 25cc serosang drainage discarded. None noted in other drain. No edema noted. Pt up in home with walker use. Pt seems in good spirits and is happy to have this surgery behind her. Pt to see surgeon tomorrow for post hospital follow up. Educated on pulmonary hygiene and the importance, s/sx of infection to incisions and the improtance of a high protein diet. Pt verb understanding of all.

## 2021-09-23 ENCOUNTER — HOME CARE VISIT (OUTPATIENT)
Dept: HOME HEALTH SERVICES | Facility: CLINIC | Age: 66
End: 2021-09-23

## 2021-09-23 VITALS
TEMPERATURE: 97.6 F | RESPIRATION RATE: 20 BRPM | OXYGEN SATURATION: 98 % | HEART RATE: 67 BPM | SYSTOLIC BLOOD PRESSURE: 118 MMHG | DIASTOLIC BLOOD PRESSURE: 67 MMHG

## 2021-09-23 PROCEDURE — G0299 HHS/HOSPICE OF RN EA 15 MIN: HCPCS

## 2021-09-24 ENCOUNTER — TELEPHONE (OUTPATIENT)
Dept: INTERNAL MEDICINE | Facility: CLINIC | Age: 66
End: 2021-09-24

## 2021-09-24 NOTE — TELEPHONE ENCOUNTER
Tried calling pt to r/s-no answer and no VM. S/w contact(verbal ok) Dr Shin (son). He said to cancel that appt and he will have her call to r/s

## 2021-09-27 ENCOUNTER — HOME CARE VISIT (OUTPATIENT)
Dept: HOME HEALTH SERVICES | Facility: CLINIC | Age: 66
End: 2021-09-27

## 2021-09-28 ENCOUNTER — READMISSION MANAGEMENT (OUTPATIENT)
Dept: CALL CENTER | Facility: HOSPITAL | Age: 66
End: 2021-09-28

## 2021-09-28 NOTE — OUTREACH NOTE
General Surgery Week 2 Survey      Responses   [facility] patient discharged from?  [place]   Does the patient have one of the following disease processes/diagnoses(primary or secondary)?  General Surgery   Week 2 attempt successful?  Yes   Call start time  0957   Call end time  1013   Discharge diagnosis  Open wound of right hip, s/p Debridement of chronic right hip wound   Meds reviewed with patient/caregiver?  Yes   Is the patient taking all medications as directed (includes completed medication regime)?  Yes   Medication comments  no pain   Has the patient kept scheduled appointments due by today?  Yes   Comments  Surgeon yesterday   Home health comments  HH/PT not coming out anymore   Psychosocial issues?  No   What is the patient's perception of their health status since discharge?  New symptoms unrelated to diagnosis [Left hip has been bothering her--rec to talk with PT and she made benfit from some stretching exercises.]   Nursing interventions  Nurse provided patient education   Is the patient/caregiver able to teach back signs and symptoms of incisional infection?  Increased redness, swelling or pain at the incisonal site, Increased drainage or bleeding, Pus or odor from incision   Is the patient/caregiver able to teach back steps to recovery at home?  Rest and rebuild strength, gradually increase activity   Is the patient/caregiver able to teach back the hierarchy of who to call/visit for symptoms/problems? PCP, Specialist, Home health nurse, Urgent Care, ED, 911  Yes   Week 2 call completed?  Yes          Naya Hunt RN

## 2021-09-30 ENCOUNTER — HOME CARE VISIT (OUTPATIENT)
Dept: HOME HEALTH SERVICES | Facility: HOME HEALTHCARE | Age: 66
End: 2021-09-30

## 2021-09-30 ENCOUNTER — HOME CARE VISIT (OUTPATIENT)
Dept: HOME HEALTH SERVICES | Facility: CLINIC | Age: 66
End: 2021-09-30

## 2021-09-30 ENCOUNTER — TELEPHONE (OUTPATIENT)
Dept: INTERNAL MEDICINE | Facility: CLINIC | Age: 66
End: 2021-09-30

## 2021-09-30 VITALS
OXYGEN SATURATION: 98 % | DIASTOLIC BLOOD PRESSURE: 62 MMHG | RESPIRATION RATE: 18 BRPM | HEART RATE: 76 BPM | TEMPERATURE: 97.2 F | SYSTOLIC BLOOD PRESSURE: 100 MMHG

## 2021-09-30 PROCEDURE — G0299 HHS/HOSPICE OF RN EA 15 MIN: HCPCS

## 2021-10-05 ENCOUNTER — READMISSION MANAGEMENT (OUTPATIENT)
Dept: CALL CENTER | Facility: HOSPITAL | Age: 66
End: 2021-10-05

## 2021-10-05 NOTE — OUTREACH NOTE
General Surgery Week 3 Survey      Responses   [facility] patient discharged from?  [place]   Does the patient have one of the following disease processes/diagnoses(primary or secondary)?  General Surgery   Week 3 attempt successful?  Yes   Call start time  1704   Call end time  1707   Discharge diagnosis  Open wound of right hip, s/p Debridement of chronic right hip wound   Is patient permission given to speak with other caregiver?  No   Meds reviewed with patient/caregiver?  Yes   Does the patient have all medications related to this admission filled (includes all antibiotics, pain medications, etc.)  Yes   Is the patient taking all medications as directed (includes completed medication regime)?  Yes   Does the patient have a follow up appointment scheduled with their surgeon?  Yes   Has the patient kept scheduled appointments due by today?  Yes   What is the Home health agency?   NO longer being seen by HH, not needed.    Psychosocial issues?  No   Did the patient receive a copy of their discharge instructions?  Yes   Nursing interventions  Reviewed instructions with patient   What is the patient's perception of their health status since discharge?  Improving   Is the patient/caregiver able to teach back signs and symptoms of incisional infection?  Fever   Is the patient/caregiver able to teach back steps to recovery at home?  Set small, achievable goals for return to baseline health, Rest and rebuild strength, gradually increase activity   Is the patient/caregiver able to teach back the hierarchy of who to call/visit for symptoms/problems? PCP, Specialist, Home health nurse, Urgent Care, ED, 911  Yes   Week 3 call completed?  Yes   Wrap up additional comments  Patient reports that she is healing well. Denies any new questions or concerns this week.           Skye Monzon RN

## 2021-10-06 DIAGNOSIS — G99.2 STENOSIS OF CERVICAL SPINE WITH MYELOPATHY (HCC): ICD-10-CM

## 2021-10-06 DIAGNOSIS — M48.02 STENOSIS OF CERVICAL SPINE WITH MYELOPATHY (HCC): ICD-10-CM

## 2021-10-07 ENCOUNTER — TELEPHONE (OUTPATIENT)
Dept: CARDIOLOGY | Facility: CLINIC | Age: 66
End: 2021-10-07

## 2021-10-07 NOTE — TELEPHONE ENCOUNTER
The reason she takes Eliquis, is not for a cardiac purpose.  It appears she has been taking this due to history of DVT.  Therefore, recommendations regarding what to do with the anticoagulant (Eliquis), needs to come from her primary care provider.  It appears Dr. Hernandez felt it was reasonable at that point in time to continue the Eliquis.  From a cardiology standpoint, as you noted, regardless of what happens with the Eliquis she will need to continue aspirin 81 mg daily indefinitely without interruption.

## 2021-10-07 NOTE — TELEPHONE ENCOUNTER
Notified patient. She confirmed she is taking aspirin 81mg daily, and she will contact her PCP regarding the continuation of eliquis. Beulah Robertson MA

## 2021-10-07 NOTE — TELEPHONE ENCOUNTER
"Patient left message stating when she was hospitalized last month Dr Hernandez told her she should not be taking eliquis. She requests \"the other medicine\" be sent to [facility].     After reading the discharge note from 9/14, I do not see anywhere that the patient was told to stop eliquis. It actually reads, \"anticoagulated with Eliquis per admitting team\" and \"Continue aspirin 81 mg daily indefinitely without interruption given her history of prior stent placement. \" Please advise. Beulah Robertson MA    "

## 2021-10-12 ENCOUNTER — OFFICE VISIT (OUTPATIENT)
Dept: INTERNAL MEDICINE | Facility: CLINIC | Age: 66
End: 2021-10-12

## 2021-10-12 VITALS
DIASTOLIC BLOOD PRESSURE: 50 MMHG | BODY MASS INDEX: 31.89 KG/M2 | HEART RATE: 63 BPM | HEIGHT: 63 IN | WEIGHT: 180 LBS | SYSTOLIC BLOOD PRESSURE: 96 MMHG | TEMPERATURE: 97.1 F | OXYGEN SATURATION: 97 %

## 2021-10-12 DIAGNOSIS — I82.502 CHRONIC EMBOLISM AND THROMBOSIS OF UNSPECIFIED DEEP VEINS OF LEFT LOWER EXTREMITY (HCC): ICD-10-CM

## 2021-10-12 DIAGNOSIS — I10 PRIMARY HYPERTENSION: Primary | ICD-10-CM

## 2021-10-12 DIAGNOSIS — E66.9 OBESITY (BMI 30-39.9): ICD-10-CM

## 2021-10-12 DIAGNOSIS — I25.10 CORONARY ARTERY DISEASE INVOLVING NATIVE CORONARY ARTERY OF NATIVE HEART WITHOUT ANGINA PECTORIS: ICD-10-CM

## 2021-10-12 PROCEDURE — 99214 OFFICE O/P EST MOD 30 MIN: CPT | Performed by: INTERNAL MEDICINE

## 2021-10-12 RX ORDER — NALOXEGOL OXALATE 25 MG/1
25 TABLET, FILM COATED ORAL EVERY MORNING
Status: ON HOLD | COMMUNITY
End: 2022-04-23

## 2021-10-12 RX ORDER — POTASSIUM CHLORIDE 1.5 G/1.77G
20 POWDER, FOR SOLUTION ORAL DAILY
COMMUNITY
End: 2022-04-26 | Stop reason: HOSPADM

## 2021-10-12 RX ORDER — FAMOTIDINE 40 MG/1
40 TABLET, FILM COATED ORAL DAILY
COMMUNITY
End: 2021-10-28 | Stop reason: HOSPADM

## 2021-10-12 RX ORDER — PNV NO.95/FERROUS FUM/FOLIC AC 28MG-0.8MG
325 TABLET ORAL
Status: ON HOLD | COMMUNITY
End: 2022-04-23

## 2021-10-12 RX ORDER — SALSALATE 500 MG
500 TABLET ORAL DAILY
COMMUNITY
End: 2022-01-25 | Stop reason: SDUPTHER

## 2021-10-12 RX ORDER — LEFLUNOMIDE 20 MG/1
20 TABLET ORAL DAILY
COMMUNITY
End: 2021-10-28 | Stop reason: HOSPADM

## 2021-10-12 RX ORDER — FOLIC ACID 1 MG/1
1 TABLET ORAL DAILY
Status: ON HOLD | COMMUNITY
End: 2022-04-23

## 2021-10-12 NOTE — PROGRESS NOTES
Subjective   Tawny Shin is a 66 y.o. female.   Chief Complaint   Patient presents with   • discuss medications     pt is unsure of what medications she should be taking        History of Present Illness this is a follow-up visit for patient she is been to recent hospitalizations I have reviewed her hospital medical records.  Her son is   one of our neurologist.  He is watching patient's medicines and being a great help and managing her condition.  The following portions of the patient's history were reviewed and updated as appropriate: allergies, current medications, past family history, past medical history, past social history, past surgical history and problem list.    Review of Systems   Constitutional: Negative for activity change, appetite change, fatigue, fever, unexpected weight gain and unexpected weight loss.   HENT: Negative for swollen glands, trouble swallowing and voice change.    Eyes: Negative for blurred vision and visual disturbance.   Respiratory: Negative for cough and shortness of breath.    Cardiovascular: Negative for chest pain, palpitations and leg swelling.   Gastrointestinal: Negative for abdominal pain, constipation, diarrhea, nausea, vomiting and indigestion.   Endocrine: Negative for cold intolerance, heat intolerance, polydipsia and polyphagia.   Genitourinary: Negative for dysuria and frequency.   Musculoskeletal: Positive for arthralgias, back pain and gait problem ( Currently using a Rollator). Negative for joint swelling and neck pain.   Skin: Negative for color change, rash and skin lesions.   Neurological: Negative for dizziness, weakness, headache, memory problem and confusion.   Hematological: Does not bruise/bleed easily.   Psychiatric/Behavioral: Negative for agitation, hallucinations and suicidal ideas. The patient is not nervous/anxious.        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   • Eustachian tube dysfunction    • Heart disease    • Herpes simplex    • Hyperlipidemia    • Hypertension    • Hypothyroidism 12/23/2020   • Intrinsic asthma 12/23/2020   • Knee dislocation    • Labral tear of right hip joint    • Laryngitis sicca    • Laryngitis, chronic    • Left carotid bruit 3/9/2016   • MI (myocardial infarction) ([facility])    • Myalgia due to statin 6/25/2019   • Osteomyelitis of right femur ([facility]) 7/6/2021   • Otorrhea    • Pacemaker 11/17/2016   • Primary osteoarthritis of left knee 12/23/2020   • Psoriasis vulgaris 12/23/2020   • S/P coronary artery stent placement 3/9/2016   • Sensorineural hearing loss    • Seropositive rheumatoid arthritis of multiple sites ([facility]) 12/27/2019    Overview:  -myochrysine '93-'96 -methotrexate '96--->11/98;r/s  restarted 2/99--> 8/14 (anemia) -sulfasalazine- not effective -penicillamine 6/98-->10/98; no effect -leflunomide 11/98--> - Humira '13-->didn't take - Enbrel 12/14-->3/15- no effect!   Last Assessment & Plan:  - \"aching all over\" because she had to be off her anti-rheumatic drugs for 2 weeks in preparation for her R knee surgery - he   • Sick sinus syndrome ([facility]) 12/27/2019   • Sjogren's disease ([facility])    • Spondylolisthesis of lumbar region 1/17/2018   • Syncope, recurrent 2/8/2021      Past Surgical History:   Procedure Laterality Date   • A-V CARDIAC PACEMAKER INSERTION  2016   • ATRIAL CARDIAC PACEMAKER INSERTION     • CARDIAC CATHETERIZATION     • CATARACT EXTRACTION     • CERVICAL CORPECTOMY N/A 3/3/2021    Procedure: CERVICAL 6 CORPECTOMY WITH " TITANIUM CAGE WITH NEURO MONITORING;  Surgeon: Bandar Shea MD;  Location:  [facility] OR;  Service: Neurosurgery;  Laterality: N/A;   • COLONOSCOPY  11/08/2011    One fold in the ascending colon which showed ulcer otherwise normal exam   • COLONOSCOPY  11/12/2004    Normal exam repeat in five years   • CORONARY ANGIOPLASTY WITH STENT PLACEMENT      X 2; 2013 & 2014   • ENDOSCOPY  07/10/2014    Normal exam   • FLAP LEG Right 9/14/2021    Procedure: RIGHT GLUTEAL FASCIOCUTANEOUS ADVANCEMENT FLAP AND RIGHT TENSOR FASCIAL JESSICA FLAP;  Surgeon: Amadeo Turner MD;  Location:  [facility] OR;  Service: Plastics;  Laterality: Right;   • HIP ABDUCTION TENOTOMY BILATERAL Right 1/14/2021    Procedure: RIGHT HIP GLUTEUS MEDLUS / MINIMUS REPAIR, POSSIBLE ACHILLES ALLOGRAFT;  Surgeon: Nino Carlson MD;  Location:  [facility] OR;  Service: Orthopedics;  Laterality: Right;   • INCISION AND DRAINAGE HIP Right 2/9/2021    Procedure: HIP INCISION AND DRAINAGE;  Surgeon: Nino Carlson MD;  Location:  [facility] OR;  Service: Orthopedics;  Laterality: Right;   • INCISION AND DRAINAGE OF WOUND Right 7/8/2021    Procedure: INCISION AND DRAINAGE WOUND RIGHT HIP;  Surgeon: James Huntley MD;  Location:  [facility] OR;  Service: Orthopedics;  Laterality: Right;   • JOINT REPLACEMENT     • LEG DEBRIDEMENT Right 9/14/2021    Procedure: DEBRIDEMENT OF RIGHT HIP WOUND, RIGHT GLUTEAL FASCIOCUTANEOUS ADVANCEMENT FLAP AND RIGHT TENSOR FASCIAL JESSICA FLAP;  Surgeon: Amadeo Turner MD;  Location:  [facility];  Service: Plastics;  Laterality: Right;   • LUMBAR DISCECTOMY Right 3/23/2021    Procedure: LUMBAR DISCECTOMY MICRO, Lumbar 1/2 right;  Surgeon: Bandar Shea MD;  Location:  [facility];  Service: Neurosurgery;  Laterality: Right;   • LUMBAR FUSION N/A 1/19/2018    Procedure: L3-4,L4-5 DECOMPRESSION, POSTERIOR SPINAL FUSION WITH INSTRUMENTATION;  Surgeon: Fortino Oropeza MD;  Location:  [facility] OR;  Service:    • LUMBAR LAMINECTOMY WITH  FUSION Left 1/17/2018    Procedure: LEFT L3-4 L4-5 LATERAL LUMBAR INTERBODY FUSION;  Surgeon: Fortino Oropeza MD;  Location: [facility] OR;  Service:    • MYRINGOTOMY W/ TUBES  09/04/2014    TUBES NO LONGER IN PLACE   • OTHER SURGICAL HISTORY      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 so hardware was removed and spacers were placed   • REPLACEMENT TOTAL KNEE Right         Current Outpatient Medications:   •  acetaminophen (TYLENOL) 325 MG tablet, Take 2 tablets by mouth Every 4 (Four) Hours As Needed for Mild Pain ., Disp: , Rfl:   •  aspirin 81 MG EC tablet, Take 1 tablet by mouth Daily., Disp: 100 tablet, Rfl: 99  •  carvedilol (COREG) 25 MG tablet, Take 1 tablet by mouth 2 (Two) Times a Day With Meals., Disp: 180 tablet, Rfl: 3  •  docusate sodium 100 MG capsule, Take 1 capsule by mouth 2 (Two) Times a Day., Disp: , Rfl:   •  famotidine (PEPCID) 40 MG tablet, Take 40 mg by mouth Daily., Disp: , Rfl:   •  ferrous sulfate 325 (65 Fe) MG tablet, Take 325 mg by mouth Daily With Breakfast., Disp: , Rfl:   •  fluticasone (FLONASE) 50 MCG/ACT nasal spray, 1 spray into the nostril(s) as directed by provider Daily As Needed., Disp: , Rfl:   •  folic acid (FOLVITE) 1 MG tablet, Take 1 mg by mouth Daily., Disp: , Rfl:   •  furosemide (LASIX) 40 MG tablet, Take 40 mg by mouth Daily., Disp: , Rfl:   •  isosorbide mononitrate (IMDUR) 60 MG 24 hr tablet, Take 1 tablet by mouth 2 (Two) Times a Day., Disp: , Rfl:   •  leflunomide (ARAVA) 20 MG tablet, Take 20 mg by mouth Daily., Disp: , Rfl:   •  levothyroxine (SYNTHROID, LEVOTHROID) 75 MCG tablet, Take 1 tablet by mouth Daily., Disp: 90 tablet, Rfl: 3  •  lidocaine (LIDODERM) 5 %, Place 1 patch on the skin as directed by provider Daily As Needed. Remove & Discard patch within 12 hours or as directed by MD , Disp: , Rfl:   •  magnesium oxide (MAG-OX) 400 MG tablet, Take 400 mg by mouth Daily., Disp: , Rfl:   •  multivitamin with minerals tablet tablet, Take 1 tablet by mouth Daily.,  Disp:  , Rfl:   •  Naloxegol Oxalate (Movantik) 25 MG tablet, Take 25 mg by mouth Every Morning., Disp: , Rfl:   •  nitroglycerin (Nitrostat) 0.4 MG SL tablet, Place 1 tablet under the tongue Every 5 (Five) Minutes As Needed for Chest Pain. Take no more than 3 doses in 15 minutes., Disp: , Rfl: 12  •  pantoprazole (PROTONIX) 40 MG EC tablet, Take 1 tablet by mouth Daily., Disp: 90 tablet, Rfl: 3  •  potassium chloride (KLOR-CON) 20 MEQ packet, Take 20 mEq by mouth 2 (Two) Times a Day., Disp: , Rfl:   •  saccharomyces boulardii (Florastor) 250 MG capsule, Take 1 capsule by mouth Daily., Disp: 90 capsule, Rfl: 3  •  salsalate (DISALCID) 500 MG tablet, Take 500 mg by mouth Daily. Take 5 tablets by mouth Monday, Wednesdays and Fridays, and 4 tablets on Tuesdays, Thursday and Sundays, Disp: , Rfl:   •  thiamine1 (VITAMIN B1) 250 MG tablet, Take 250 mg by mouth Daily., Disp: , Rfl:   •  valACYclovir (VALTREX) 500 MG tablet, Take 1 tablet by mouth Daily. Indications: chronic suppressive therapy, Disp: 90 tablet, Rfl: 3  •  rivaroxaban (Xarelto) 20 MG tablet, Take 1 tablet by mouth Daily., Disp: 30 tablet, Rfl: 5  •  sucralfate (CARAFATE) 1 g tablet, Take 1 g by mouth 4 (Four) Times a Day Before Meals & at Bedtime., Disp: , Rfl:   •  traMADol (ULTRAM) 50 MG tablet, Take 50 mg by mouth 3 (Three) Times a Day., Disp: , Rfl:       Vitals:    10/12/21 1019   BP: 96/50   Pulse: 63   Temp: 97.1 °F (36.2 °C)   SpO2: 97%         10/12/21  1019   Weight: 81.6 kg (180 lb)       Body mass index is 31.49 kg/m².    Physical Exam  Constitutional:       Appearance: She is well-developed.   HENT:      Head: Normocephalic and atraumatic.   Eyes:      Pupils: Pupils are equal, round, and reactive to light.   Cardiovascular:      Rate and Rhythm: Normal rate and regular rhythm.   Pulmonary:      Effort: Pulmonary effort is normal.      Breath sounds: Normal breath sounds.   Abdominal:      General: Bowel sounds are normal.      Palpations:  Abdomen is soft.   Musculoskeletal:         General: Deformity present.      Cervical back: Normal range of motion and neck supple.   Skin:     General: Skin is warm and dry.      Findings: Lesion ( Patient has a 1.2 cm bleeding lesion lower right back.  She states that she sees a dermatologist and they have examined this.) present.   Neurological:      Mental Status: She is alert and oriented to person, place, and time.      Gait: Gait abnormal.   Psychiatric:         Behavior: Behavior normal.               Assessment/Plan   Diagnoses and all orders for this visit:    1. Primary hypertension (Primary)  -     Basic Metabolic Panel  -     Magnesium    2. Obesity (BMI 30-39.9)    3. Chronic embolism and thrombosis of unspecified deep veins of left lower extremity (HCC)    4. Coronary artery disease involving native coronary artery of native heart without angina pectoris    Other orders  -     rivaroxaban (Xarelto) 20 MG tablet; Take 1 tablet by mouth Daily.  Dispense: 30 tablet; Refill: 5      At today's visit patient wanted a cheaper medication than Eliquis I have sent Xarelto and and encouraged her to check with her son as well as the pharmacy be happy to prescribe an alternate to the Xarelto if a cheaper alternative is available.  Her blood pressures actually low today but she is not symptomatic from that I am not going to change her medications.  She is walking much better today with a Rollator.  She asked me about driving and I have told her that she needs not to drive a car she is living somewhat independently in her own home rather than assisted living I think from a mental standpoint that is probably a good thing for her as long as she has someone helping her driving assisting her etc.

## 2021-10-13 ENCOUNTER — TELEPHONE (OUTPATIENT)
Dept: INTERNAL MEDICINE | Facility: CLINIC | Age: 66
End: 2021-10-13

## 2021-10-13 LAB
BUN SERPL-MCNC: 14 MG/DL (ref 8–23)
BUN/CREAT SERPL: 21.5 (ref 7–25)
CALCIUM SERPL-MCNC: 9.4 MG/DL (ref 8.6–10.5)
CHLORIDE SERPL-SCNC: 107 MMOL/L (ref 98–107)
CO2 SERPL-SCNC: 24.2 MMOL/L (ref 22–29)
CREAT SERPL-MCNC: 0.65 MG/DL (ref 0.57–1)
GLUCOSE SERPL-MCNC: 95 MG/DL (ref 65–99)
MAGNESIUM SERPL-MCNC: 1.8 MG/DL (ref 1.6–2.4)
POTASSIUM SERPL-SCNC: 4.4 MMOL/L (ref 3.5–5.2)
SODIUM SERPL-SCNC: 142 MMOL/L (ref 136–145)

## 2021-10-14 PROCEDURE — 93294 REM INTERROG EVL PM/LDLS PM: CPT | Performed by: INTERNAL MEDICINE

## 2021-10-14 PROCEDURE — 93296 REM INTERROG EVL PM/IDS: CPT | Performed by: INTERNAL MEDICINE

## 2021-10-15 ENCOUNTER — HOME CARE VISIT (OUTPATIENT)
Dept: HOME HEALTH SERVICES | Facility: HOME HEALTHCARE | Age: 66
End: 2021-10-15

## 2021-10-15 ENCOUNTER — READMISSION MANAGEMENT (OUTPATIENT)
Dept: CALL CENTER | Facility: HOSPITAL | Age: 66
End: 2021-10-15

## 2021-10-15 NOTE — OUTREACH NOTE
General Surgery Week 4 Survey      Responses   [facility] patient discharged from? [place]   Does the patient have one of the following disease processes/diagnoses(primary or secondary)? General Surgery   Week 4 attempt successful? Yes   Call start time 0914   Call end time 0916   Discharge diagnosis Open wound of right hip, s/p Debridement of chronic right hip wound   Is patient permission given to speak with other caregiver? No   Is the patient taking all medications as directed (includes completed medication regime)? Yes   Medication comments minimal pain.   Has the patient kept scheduled appointments due by today? Yes   Comments She sees surgeon on Monday.    Home health comments HH and PT have dismissed her.    Psychosocial issues? No   What is the patient's perception of their health status since discharge? Improving   Nursing interventions Nurse provided patient education   Is the patient/caregiver able to teach back steps to recovery at home? Set small, achievable goals for return to baseline health,  Rest and rebuild strength, gradually increase activity   Is the patient/caregiver able to teach back the hierarchy of who to call/visit for symptoms/problems? PCP, Specialist, Home health nurse, Urgent Care, ED, 911 Yes   Week 4 call completed? Yes   Would the patient like one additional call? No   Graduated Yes   Is the patient interested in additional calls from an ambulatory ?  NOTE:  applies to high risk patients requiring additional follow-up. No   Did the patient feel the follow up calls were helpful during their recovery period? Yes   Was the number of calls appropriate? Yes   Would the patient like more information on Advance Care Planning? No   Wrap up additional comments She is healing well no problems.           Ximena Lyn RN

## 2021-10-18 ENCOUNTER — HOME CARE VISIT (OUTPATIENT)
Dept: HOME HEALTH SERVICES | Facility: CLINIC | Age: 66
End: 2021-10-18

## 2021-10-18 VITALS
RESPIRATION RATE: 18 BRPM | SYSTOLIC BLOOD PRESSURE: 134 MMHG | TEMPERATURE: 97.5 F | DIASTOLIC BLOOD PRESSURE: 64 MMHG | HEART RATE: 71 BPM | OXYGEN SATURATION: 98 %

## 2021-10-18 PROCEDURE — G0299 HHS/HOSPICE OF RN EA 15 MIN: HCPCS

## 2021-10-18 NOTE — HOME HEALTH
Patient left a voicemail stating that she didn't need a visit today. She is asking about discharge plans. Clinical manager made aware.

## 2021-10-21 ENCOUNTER — APPOINTMENT (OUTPATIENT)
Dept: MRI IMAGING | Facility: HOSPITAL | Age: 66
End: 2021-10-21

## 2021-10-21 ENCOUNTER — HOSPITAL ENCOUNTER (INPATIENT)
Facility: HOSPITAL | Age: 66
LOS: 7 days | Discharge: NURSING, REHAB OR LONG-TERM CARE | End: 2021-10-28
Attending: INTERNAL MEDICINE | Admitting: FAMILY MEDICINE

## 2021-10-21 ENCOUNTER — APPOINTMENT (OUTPATIENT)
Dept: CT IMAGING | Facility: HOSPITAL | Age: 66
End: 2021-10-21

## 2021-10-21 DIAGNOSIS — R53.1 GENERALIZED WEAKNESS: ICD-10-CM

## 2021-10-21 DIAGNOSIS — Z78.9 DECREASED ACTIVITIES OF DAILY LIVING (ADL): ICD-10-CM

## 2021-10-21 DIAGNOSIS — Z74.09 IMPAIRED MOBILITY: ICD-10-CM

## 2021-10-21 DIAGNOSIS — D72.825 BANDEMIA: ICD-10-CM

## 2021-10-21 DIAGNOSIS — N39.0 URINARY TRACT INFECTION WITHOUT HEMATURIA, SITE UNSPECIFIED: ICD-10-CM

## 2021-10-21 DIAGNOSIS — S22.080A T12 COMPRESSION FRACTURE, INITIAL ENCOUNTER (HCC): Primary | ICD-10-CM

## 2021-10-21 DIAGNOSIS — S22.000A COMPRESSION FRACTURE OF THORACIC VERTEBRA, CLOSED, INITIAL ENCOUNTER (HCC): ICD-10-CM

## 2021-10-21 DIAGNOSIS — E87.6 HYPOKALEMIA: ICD-10-CM

## 2021-10-21 PROBLEM — M80.08XA PATHOLOGICAL FRACTURE OF VERTEBRA DUE TO OSTEOPOROSIS: Status: ACTIVE | Noted: 2021-10-21

## 2021-10-21 PROBLEM — Z79.01 CHRONIC ANTICOAGULATION: Status: ACTIVE | Noted: 2021-10-21

## 2021-10-21 PROBLEM — M81.0 OSTEOPOROSIS: Status: ACTIVE | Noted: 2021-10-21

## 2021-10-21 PROBLEM — D63.8 ANEMIA OF CHRONIC DISEASE: Status: ACTIVE | Noted: 2021-10-21

## 2021-10-21 PROBLEM — D50.9 IRON DEFICIENCY ANEMIA: Status: ACTIVE | Noted: 2021-10-21

## 2021-10-21 PROBLEM — M54.6 ACUTE BILATERAL THORACIC BACK PAIN: Status: ACTIVE | Noted: 2021-10-21

## 2021-10-21 PROBLEM — R74.01 TRANSAMINITIS: Status: ACTIVE | Noted: 2021-10-21

## 2021-10-21 LAB
ALBUMIN SERPL-MCNC: 3 G/DL (ref 3.5–5.2)
ALBUMIN/GLOB SERPL: 0.9 G/DL
ALP SERPL-CCNC: 136 U/L (ref 39–117)
ALT SERPL W P-5'-P-CCNC: 23 U/L (ref 1–33)
ANION GAP SERPL CALCULATED.3IONS-SCNC: 12 MMOL/L (ref 5–15)
ANISOCYTOSIS BLD QL: ABNORMAL
AST SERPL-CCNC: 42 U/L (ref 1–32)
BACTERIA UR QL AUTO: ABNORMAL /HPF
BILIRUB SERPL-MCNC: 1.4 MG/DL (ref 0–1.2)
BUN SERPL-MCNC: 23 MG/DL (ref 8–23)
BUN/CREAT SERPL: 31.1 (ref 7–25)
CALCIUM SPEC-SCNC: 8.4 MG/DL (ref 8.6–10.5)
CHLORIDE SERPL-SCNC: 99 MMOL/L (ref 98–107)
CK SERPL-CCNC: 207 U/L (ref 20–180)
CO2 SERPL-SCNC: 24 MMOL/L (ref 22–29)
CREAT SERPL-MCNC: 0.74 MG/DL (ref 0.57–1)
D-LACTATE SERPL-SCNC: 1 MMOL/L (ref 0.5–2)
DEPRECATED RDW RBC AUTO: 52 FL (ref 37–54)
ERYTHROCYTE [DISTWIDTH] IN BLOOD BY AUTOMATED COUNT: 16.8 % (ref 12.3–15.4)
GFR SERPL CREATININE-BSD FRML MDRD: 78 ML/MIN/1.73
GIANT PLATELETS: ABNORMAL
GLOBULIN UR ELPH-MCNC: 3.2 GM/DL
GLUCOSE SERPL-MCNC: 107 MG/DL (ref 65–99)
HCT VFR BLD AUTO: 29.8 % (ref 34–46.6)
HGB BLD-MCNC: 9.7 G/DL (ref 12–15.9)
HYALINE CASTS UR QL AUTO: ABNORMAL /LPF
LYMPHOCYTES # BLD MANUAL: 0.43 10*3/MM3 (ref 0.7–3.1)
LYMPHOCYTES NFR BLD MANUAL: 2 % (ref 5–12)
LYMPHOCYTES NFR BLD MANUAL: 4 % (ref 19.6–45.3)
MAGNESIUM SERPL-MCNC: 1.7 MG/DL (ref 1.6–2.4)
MCH RBC QN AUTO: 27.6 PG (ref 26.6–33)
MCHC RBC AUTO-ENTMCNC: 32.6 G/DL (ref 31.5–35.7)
MCV RBC AUTO: 84.7 FL (ref 79–97)
MONOCYTES # BLD AUTO: 0.12 10*3/MM3 (ref 0.1–0.9)
NEUTROPHILS # BLD AUTO: 5.62 10*3/MM3 (ref 1.7–7)
NEUTROPHILS NFR BLD MANUAL: 81 % (ref 42.7–76)
NEUTS BAND NFR BLD MANUAL: 10 % (ref 0–5)
PLATELET # BLD AUTO: 100 10*3/MM3 (ref 140–450)
PMV BLD AUTO: 12.1 FL (ref 6–12)
POLYCHROMASIA BLD QL SMEAR: ABNORMAL
POTASSIUM SERPL-SCNC: 2.9 MMOL/L (ref 3.5–5.2)
PROT SERPL-MCNC: 6.2 G/DL (ref 6–8.5)
RBC # BLD AUTO: 3.52 10*6/MM3 (ref 3.77–5.28)
RBC # UR: ABNORMAL /HPF
REF LAB TEST METHOD: ABNORMAL
SARS-COV-2 RNA PNL SPEC NAA+PROBE: NOT DETECTED
SMALL PLATELETS BLD QL SMEAR: ABNORMAL
SODIUM SERPL-SCNC: 135 MMOL/L (ref 136–145)
SQUAMOUS #/AREA URNS HPF: ABNORMAL /HPF
T3FREE SERPL-MCNC: 1.2 PG/ML (ref 2–4.4)
T4 FREE SERPL-MCNC: 0.6 NG/DL (ref 0.93–1.7)
TROPONIN T SERPL-MCNC: <0.01 NG/ML (ref 0–0.03)
TSH SERPL DL<=0.05 MIU/L-ACNC: 1.88 UIU/ML (ref 0.27–4.2)
VARIANT LYMPHS NFR BLD MANUAL: 3 % (ref 0–5)
WBC # BLD AUTO: 6.18 10*3/MM3 (ref 3.4–10.8)
WBC MORPH BLD: NORMAL
WBC UR QL AUTO: ABNORMAL /HPF

## 2021-10-21 PROCEDURE — P9612 CATHETERIZE FOR URINE SPEC: HCPCS

## 2021-10-21 PROCEDURE — 72157 MRI CHEST SPINE W/O & W/DYE: CPT

## 2021-10-21 PROCEDURE — 84484 ASSAY OF TROPONIN QUANT: CPT | Performed by: NURSE PRACTITIONER

## 2021-10-21 PROCEDURE — 25010000002 ONDANSETRON PER 1 MG: Performed by: NURSE PRACTITIONER

## 2021-10-21 PROCEDURE — 93005 ELECTROCARDIOGRAM TRACING: CPT | Performed by: NURSE PRACTITIONER

## 2021-10-21 PROCEDURE — 87186 SC STD MICRODIL/AGAR DIL: CPT | Performed by: NURSE PRACTITIONER

## 2021-10-21 PROCEDURE — 83605 ASSAY OF LACTIC ACID: CPT | Performed by: NURSE PRACTITIONER

## 2021-10-21 PROCEDURE — 94799 UNLISTED PULMONARY SVC/PX: CPT

## 2021-10-21 PROCEDURE — 25010000002 HYDROMORPHONE PER 4 MG: Performed by: NURSE PRACTITIONER

## 2021-10-21 PROCEDURE — 93010 ELECTROCARDIOGRAM REPORT: CPT | Performed by: INTERNAL MEDICINE

## 2021-10-21 PROCEDURE — 87150 DNA/RNA AMPLIFIED PROBE: CPT | Performed by: NURSE PRACTITIONER

## 2021-10-21 PROCEDURE — 25010000002 PIPERACILLIN SOD-TAZOBACTAM PER 1 G: Performed by: NURSE PRACTITIONER

## 2021-10-21 PROCEDURE — 85025 COMPLETE CBC W/AUTO DIFF WBC: CPT | Performed by: NURSE PRACTITIONER

## 2021-10-21 PROCEDURE — A9577 INJ MULTIHANCE: HCPCS | Performed by: NURSE PRACTITIONER

## 2021-10-21 PROCEDURE — 72131 CT LUMBAR SPINE W/O DYE: CPT

## 2021-10-21 PROCEDURE — 0 GADOBENATE DIMEGLUMINE 529 MG/ML SOLUTION: Performed by: NURSE PRACTITIONER

## 2021-10-21 PROCEDURE — 84481 FREE ASSAY (FT-3): CPT | Performed by: NURSE PRACTITIONER

## 2021-10-21 PROCEDURE — 87086 URINE CULTURE/COLONY COUNT: CPT | Performed by: NURSE PRACTITIONER

## 2021-10-21 PROCEDURE — 99285 EMERGENCY DEPT VISIT HI MDM: CPT

## 2021-10-21 PROCEDURE — 87040 BLOOD CULTURE FOR BACTERIA: CPT | Performed by: NURSE PRACTITIONER

## 2021-10-21 PROCEDURE — 81001 URINALYSIS AUTO W/SCOPE: CPT | Performed by: NURSE PRACTITIONER

## 2021-10-21 PROCEDURE — 85007 BL SMEAR W/DIFF WBC COUNT: CPT | Performed by: NURSE PRACTITIONER

## 2021-10-21 PROCEDURE — 25010000002 POTASSIUM CHLORIDE PER 2 MEQ: Performed by: NURSE PRACTITIONER

## 2021-10-21 PROCEDURE — 83735 ASSAY OF MAGNESIUM: CPT | Performed by: NURSE PRACTITIONER

## 2021-10-21 PROCEDURE — 87635 SARS-COV-2 COVID-19 AMP PRB: CPT | Performed by: NURSE PRACTITIONER

## 2021-10-21 PROCEDURE — 80053 COMPREHEN METABOLIC PANEL: CPT | Performed by: NURSE PRACTITIONER

## 2021-10-21 PROCEDURE — 82550 ASSAY OF CK (CPK): CPT | Performed by: NURSE PRACTITIONER

## 2021-10-21 PROCEDURE — 25010000003 HYDROMORPHONE 1 MG/ML SOLUTION: Performed by: NURSE PRACTITIONER

## 2021-10-21 PROCEDURE — 72128 CT CHEST SPINE W/O DYE: CPT

## 2021-10-21 PROCEDURE — 25010000002 MAGNESIUM SULFATE IN D5W 1G/100ML (PREMIX) 1-5 GM/100ML-% SOLUTION: Performed by: NURSE PRACTITIONER

## 2021-10-21 PROCEDURE — 84443 ASSAY THYROID STIM HORMONE: CPT | Performed by: NURSE PRACTITIONER

## 2021-10-21 PROCEDURE — 84439 ASSAY OF FREE THYROXINE: CPT | Performed by: NURSE PRACTITIONER

## 2021-10-21 PROCEDURE — 72158 MRI LUMBAR SPINE W/O & W/DYE: CPT

## 2021-10-21 PROCEDURE — 87147 CULTURE TYPE IMMUNOLOGIC: CPT | Performed by: NURSE PRACTITIONER

## 2021-10-21 RX ORDER — MULTIPLE VITAMINS W/ MINERALS TAB 9MG-400MCG
1 TAB ORAL DAILY
Status: DISCONTINUED | OUTPATIENT
Start: 2021-10-22 | End: 2021-10-28 | Stop reason: HOSPADM

## 2021-10-21 RX ORDER — NITROGLYCERIN 0.4 MG/1
0.4 TABLET SUBLINGUAL
Status: DISCONTINUED | OUTPATIENT
Start: 2021-10-21 | End: 2021-10-28 | Stop reason: HOSPADM

## 2021-10-21 RX ORDER — POTASSIUM CHLORIDE 1.5 G/1.77G
40 POWDER, FOR SOLUTION ORAL ONCE
Status: COMPLETED | OUTPATIENT
Start: 2021-10-21 | End: 2021-10-21

## 2021-10-21 RX ORDER — FAMOTIDINE 20 MG/1
40 TABLET, FILM COATED ORAL DAILY
Status: DISCONTINUED | OUTPATIENT
Start: 2021-10-22 | End: 2021-10-28 | Stop reason: HOSPADM

## 2021-10-21 RX ORDER — FOLIC ACID 1 MG/1
1 TABLET ORAL DAILY
Status: DISCONTINUED | OUTPATIENT
Start: 2021-10-22 | End: 2021-10-28 | Stop reason: HOSPADM

## 2021-10-21 RX ORDER — LIDOCAINE 50 MG/G
1 PATCH TOPICAL DAILY PRN
Status: DISCONTINUED | OUTPATIENT
Start: 2021-10-21 | End: 2021-10-28 | Stop reason: HOSPADM

## 2021-10-21 RX ORDER — POTASSIUM CHLORIDE 750 MG/1
40 CAPSULE, EXTENDED RELEASE ORAL
Status: COMPLETED | OUTPATIENT
Start: 2021-10-21 | End: 2021-10-22

## 2021-10-21 RX ORDER — TRAMADOL HYDROCHLORIDE 50 MG/1
50 TABLET ORAL 3 TIMES DAILY
Status: DISCONTINUED | OUTPATIENT
Start: 2021-10-21 | End: 2021-10-28 | Stop reason: HOSPADM

## 2021-10-21 RX ORDER — ONDANSETRON 2 MG/ML
4 INJECTION INTRAMUSCULAR; INTRAVENOUS ONCE
Status: COMPLETED | OUTPATIENT
Start: 2021-10-21 | End: 2021-10-21

## 2021-10-21 RX ORDER — FUROSEMIDE 40 MG/1
40 TABLET ORAL
Status: DISCONTINUED | OUTPATIENT
Start: 2021-10-22 | End: 2021-10-28 | Stop reason: HOSPADM

## 2021-10-21 RX ORDER — LEVOTHYROXINE SODIUM 0.07 MG/1
75 TABLET ORAL
Status: DISCONTINUED | OUTPATIENT
Start: 2021-10-22 | End: 2021-10-28 | Stop reason: HOSPADM

## 2021-10-21 RX ORDER — POTASSIUM CHLORIDE 1.5 G/1.77G
20 POWDER, FOR SOLUTION ORAL 2 TIMES DAILY
Status: DISCONTINUED | OUTPATIENT
Start: 2021-10-22 | End: 2021-10-28 | Stop reason: HOSPADM

## 2021-10-21 RX ORDER — DOCUSATE SODIUM 100 MG/1
100 CAPSULE, LIQUID FILLED ORAL 2 TIMES DAILY
Status: DISCONTINUED | OUTPATIENT
Start: 2021-10-21 | End: 2021-10-28 | Stop reason: HOSPADM

## 2021-10-21 RX ORDER — ASPIRIN 81 MG/1
81 TABLET ORAL DAILY
Status: DISCONTINUED | OUTPATIENT
Start: 2021-10-22 | End: 2021-10-28 | Stop reason: HOSPADM

## 2021-10-21 RX ORDER — POTASSIUM CHLORIDE 1.5 G/1.77G
40 POWDER, FOR SOLUTION ORAL 2 TIMES DAILY
Status: DISCONTINUED | OUTPATIENT
Start: 2021-10-22 | End: 2021-10-21

## 2021-10-21 RX ORDER — ACETAMINOPHEN 650 MG/1
650 SUPPOSITORY RECTAL EVERY 4 HOURS PRN
Status: DISCONTINUED | OUTPATIENT
Start: 2021-10-21 | End: 2021-10-28 | Stop reason: HOSPADM

## 2021-10-21 RX ORDER — MAGNESIUM SULFATE 1 G/100ML
1 INJECTION INTRAVENOUS ONCE
Status: COMPLETED | OUTPATIENT
Start: 2021-10-21 | End: 2021-10-21

## 2021-10-21 RX ORDER — SODIUM CHLORIDE 0.9 % (FLUSH) 0.9 %
10 SYRINGE (ML) INJECTION AS NEEDED
Status: DISCONTINUED | OUTPATIENT
Start: 2021-10-21 | End: 2021-10-28 | Stop reason: HOSPADM

## 2021-10-21 RX ORDER — POTASSIUM CHLORIDE 14.9 MG/ML
20 INJECTION INTRAVENOUS ONCE
Status: COMPLETED | OUTPATIENT
Start: 2021-10-21 | End: 2021-10-21

## 2021-10-21 RX ORDER — SODIUM CHLORIDE 0.9 % (FLUSH) 0.9 %
10 SYRINGE (ML) INJECTION EVERY 12 HOURS SCHEDULED
Status: DISCONTINUED | OUTPATIENT
Start: 2021-10-21 | End: 2021-10-28 | Stop reason: HOSPADM

## 2021-10-21 RX ORDER — LEFLUNOMIDE 20 MG/1
20 TABLET ORAL DAILY
Status: DISCONTINUED | OUTPATIENT
Start: 2021-10-22 | End: 2021-10-26

## 2021-10-21 RX ORDER — FERROUS SULFATE 325(65) MG
325 TABLET ORAL
Status: DISCONTINUED | OUTPATIENT
Start: 2021-10-22 | End: 2021-10-28 | Stop reason: HOSPADM

## 2021-10-21 RX ORDER — HYDROMORPHONE HYDROCHLORIDE 1 MG/ML
0.5 INJECTION, SOLUTION INTRAMUSCULAR; INTRAVENOUS; SUBCUTANEOUS ONCE
Status: COMPLETED | OUTPATIENT
Start: 2021-10-21 | End: 2021-10-21

## 2021-10-21 RX ORDER — HYDROCODONE BITARTRATE AND ACETAMINOPHEN 7.5; 325 MG/1; MG/1
1 TABLET ORAL EVERY 4 HOURS PRN
Status: DISCONTINUED | OUTPATIENT
Start: 2021-10-21 | End: 2021-10-28 | Stop reason: HOSPADM

## 2021-10-21 RX ORDER — SACCHAROMYCES BOULARDII 250 MG
250 CAPSULE ORAL DAILY
Status: DISCONTINUED | OUTPATIENT
Start: 2021-10-22 | End: 2021-10-28 | Stop reason: HOSPADM

## 2021-10-21 RX ORDER — ISOSORBIDE MONONITRATE 60 MG/1
60 TABLET, EXTENDED RELEASE ORAL
Status: DISCONTINUED | OUTPATIENT
Start: 2021-10-21 | End: 2021-10-28 | Stop reason: HOSPADM

## 2021-10-21 RX ORDER — CARVEDILOL 25 MG/1
25 TABLET ORAL 2 TIMES DAILY WITH MEALS
Status: DISCONTINUED | OUTPATIENT
Start: 2021-10-21 | End: 2021-10-28 | Stop reason: HOSPADM

## 2021-10-21 RX ORDER — SUCRALFATE 1 G/1
1 TABLET ORAL
Status: DISCONTINUED | OUTPATIENT
Start: 2021-10-21 | End: 2021-10-28 | Stop reason: HOSPADM

## 2021-10-21 RX ORDER — ACETAMINOPHEN 160 MG/5ML
650 SOLUTION ORAL EVERY 4 HOURS PRN
Status: DISCONTINUED | OUTPATIENT
Start: 2021-10-21 | End: 2021-10-28 | Stop reason: HOSPADM

## 2021-10-21 RX ORDER — ONDANSETRON 2 MG/ML
4 INJECTION INTRAMUSCULAR; INTRAVENOUS EVERY 6 HOURS PRN
Status: DISCONTINUED | OUTPATIENT
Start: 2021-10-21 | End: 2021-10-28 | Stop reason: HOSPADM

## 2021-10-21 RX ORDER — VALACYCLOVIR HYDROCHLORIDE 500 MG/1
500 TABLET, FILM COATED ORAL
Status: DISCONTINUED | OUTPATIENT
Start: 2021-10-22 | End: 2021-10-28 | Stop reason: HOSPADM

## 2021-10-21 RX ORDER — ACETAMINOPHEN 325 MG/1
650 TABLET ORAL EVERY 4 HOURS PRN
Status: DISCONTINUED | OUTPATIENT
Start: 2021-10-21 | End: 2021-10-28 | Stop reason: HOSPADM

## 2021-10-21 RX ORDER — ONDANSETRON 4 MG/1
4 TABLET, FILM COATED ORAL EVERY 6 HOURS PRN
Status: DISCONTINUED | OUTPATIENT
Start: 2021-10-21 | End: 2021-10-28 | Stop reason: HOSPADM

## 2021-10-21 RX ORDER — PANTOPRAZOLE SODIUM 40 MG/1
40 TABLET, DELAYED RELEASE ORAL
Status: DISCONTINUED | OUTPATIENT
Start: 2021-10-22 | End: 2021-10-28 | Stop reason: HOSPADM

## 2021-10-21 RX ORDER — FLUTICASONE PROPIONATE 50 MCG
1 SPRAY, SUSPENSION (ML) NASAL DAILY PRN
Status: DISCONTINUED | OUTPATIENT
Start: 2021-10-21 | End: 2021-10-28 | Stop reason: HOSPADM

## 2021-10-21 RX ADMIN — CARVEDILOL 25 MG: 25 TABLET, FILM COATED ORAL at 22:02

## 2021-10-21 RX ADMIN — POTASSIUM CHLORIDE 40 MEQ: 10 CAPSULE, COATED, EXTENDED RELEASE ORAL at 22:00

## 2021-10-21 RX ADMIN — GADOBENATE DIMEGLUMINE 15 ML: 529 INJECTION, SOLUTION INTRAVENOUS at 14:30

## 2021-10-21 RX ADMIN — ONDANSETRON 4 MG: 2 INJECTION INTRAMUSCULAR; INTRAVENOUS at 09:57

## 2021-10-21 RX ADMIN — MAGNESIUM SULFATE 1 G: 1 INJECTION INTRAVENOUS at 23:52

## 2021-10-21 RX ADMIN — SUCRALFATE 1 G: 1 TABLET ORAL at 22:01

## 2021-10-21 RX ADMIN — ISOSORBIDE MONONITRATE 60 MG: 60 TABLET, EXTENDED RELEASE ORAL at 22:00

## 2021-10-21 RX ADMIN — POTASSIUM CHLORIDE 40 MEQ: 1.5 POWDER, FOR SOLUTION ORAL at 12:59

## 2021-10-21 RX ADMIN — HYDROMORPHONE HYDROCHLORIDE 1 MG: 1 INJECTION, SOLUTION INTRAMUSCULAR; INTRAVENOUS; SUBCUTANEOUS at 13:00

## 2021-10-21 RX ADMIN — PIPERACILLIN SODIUM AND TAZOBACTAM SODIUM 3.38 G: 3; .375 INJECTION, POWDER, LYOPHILIZED, FOR SOLUTION INTRAVENOUS at 12:35

## 2021-10-21 RX ADMIN — PIPERACILLIN SODIUM AND TAZOBACTAM SODIUM 3.38 G: 3; .375 INJECTION, POWDER, LYOPHILIZED, FOR SOLUTION INTRAVENOUS at 21:59

## 2021-10-21 RX ADMIN — DOCUSATE SODIUM 100 MG: 100 CAPSULE ORAL at 22:00

## 2021-10-21 RX ADMIN — TRAMADOL HYDROCHLORIDE 50 MG: 50 TABLET, FILM COATED ORAL at 22:02

## 2021-10-21 RX ADMIN — POTASSIUM CHLORIDE 20 MEQ: 14.9 INJECTION, SOLUTION INTRAVENOUS at 13:03

## 2021-10-21 RX ADMIN — Medication 10 ML: at 22:01

## 2021-10-21 RX ADMIN — HYDROMORPHONE HYDROCHLORIDE 0.5 MG: 1 INJECTION, SOLUTION INTRAMUSCULAR; INTRAVENOUS; SUBCUTANEOUS at 09:57

## 2021-10-22 ENCOUNTER — APPOINTMENT (OUTPATIENT)
Dept: CT IMAGING | Facility: HOSPITAL | Age: 66
End: 2021-10-22

## 2021-10-22 PROBLEM — E44.0 MODERATE MALNUTRITION: Status: ACTIVE | Noted: 2021-10-22

## 2021-10-22 PROBLEM — R78.81 BACTEREMIA: Status: ACTIVE | Noted: 2021-10-22

## 2021-10-22 LAB
ALBUMIN SERPL-MCNC: 2.6 G/DL (ref 3.5–5.2)
ALBUMIN/GLOB SERPL: 0.8 G/DL
ALP SERPL-CCNC: 98 U/L (ref 39–117)
ALT SERPL W P-5'-P-CCNC: 17 U/L (ref 1–33)
ANION GAP SERPL CALCULATED.3IONS-SCNC: 11 MMOL/L (ref 5–15)
AST SERPL-CCNC: 29 U/L (ref 1–32)
BACTERIA BLD CULT: ABNORMAL
BILIRUB SERPL-MCNC: 1.2 MG/DL (ref 0–1.2)
BILIRUB UR QL STRIP: ABNORMAL
BUN SERPL-MCNC: 19 MG/DL (ref 8–23)
BUN/CREAT SERPL: 30.6 (ref 7–25)
CALCIUM SPEC-SCNC: 8.5 MG/DL (ref 8.6–10.5)
CHLORIDE SERPL-SCNC: 104 MMOL/L (ref 98–107)
CLARITY UR: CLEAR
CO2 SERPL-SCNC: 20 MMOL/L (ref 22–29)
COLOR UR: ABNORMAL
CREAT SERPL-MCNC: 0.62 MG/DL (ref 0.57–1)
DEPRECATED RDW RBC AUTO: 53.7 FL (ref 37–54)
DEPRECATED RDW RBC AUTO: 55 FL (ref 37–54)
ERYTHROCYTE [DISTWIDTH] IN BLOOD BY AUTOMATED COUNT: 17.2 % (ref 12.3–15.4)
ERYTHROCYTE [DISTWIDTH] IN BLOOD BY AUTOMATED COUNT: 17.7 % (ref 12.3–15.4)
GFR SERPL CREATININE-BSD FRML MDRD: 96 ML/MIN/1.73
GLOBULIN UR ELPH-MCNC: 3.2 GM/DL
GLUCOSE SERPL-MCNC: 90 MG/DL (ref 65–99)
GLUCOSE UR STRIP-MCNC: NEGATIVE MG/DL
HCT VFR BLD AUTO: 26.6 % (ref 34–46.6)
HCT VFR BLD AUTO: 26.7 % (ref 34–46.6)
HGB BLD-MCNC: 8.3 G/DL (ref 12–15.9)
HGB BLD-MCNC: 8.5 G/DL (ref 12–15.9)
HGB UR QL STRIP.AUTO: NEGATIVE
KETONES UR QL STRIP: NEGATIVE
LEUKOCYTE ESTERASE UR QL STRIP.AUTO: NEGATIVE
MCH RBC QN AUTO: 26.2 PG (ref 26.6–33)
MCH RBC QN AUTO: 26.9 PG (ref 26.6–33)
MCHC RBC AUTO-ENTMCNC: 31.2 G/DL (ref 31.5–35.7)
MCHC RBC AUTO-ENTMCNC: 31.8 G/DL (ref 31.5–35.7)
MCV RBC AUTO: 83.9 FL (ref 79–97)
MCV RBC AUTO: 84.5 FL (ref 79–97)
NITRITE UR QL STRIP: POSITIVE
PH UR STRIP.AUTO: 5.5 [PH] (ref 5–8)
PLATELET # BLD AUTO: 113 10*3/MM3 (ref 140–450)
PLATELET # BLD AUTO: 82 10*3/MM3 (ref 140–450)
PMV BLD AUTO: 11.9 FL (ref 6–12)
PMV BLD AUTO: 12.1 FL (ref 6–12)
POTASSIUM SERPL-SCNC: 4.7 MMOL/L (ref 3.5–5.2)
PROT SERPL-MCNC: 5.8 G/DL (ref 6–8.5)
PROT UR QL STRIP: ABNORMAL
QT INTERVAL: 398 MS
QTC INTERVAL: 423 MS
RBC # BLD AUTO: 3.16 10*6/MM3 (ref 3.77–5.28)
RBC # BLD AUTO: 3.17 10*6/MM3 (ref 3.77–5.28)
SODIUM SERPL-SCNC: 135 MMOL/L (ref 136–145)
SP GR UR STRIP: >1.03 (ref 1–1.03)
UROBILINOGEN UR QL STRIP: ABNORMAL
WBC # BLD AUTO: 6.05 10*3/MM3 (ref 3.4–10.8)
WBC # BLD AUTO: 6.52 10*3/MM3 (ref 3.4–10.8)

## 2021-10-22 PROCEDURE — 73702 CT LWR EXTREMITY W/O&W/DYE: CPT

## 2021-10-22 PROCEDURE — 97530 THERAPEUTIC ACTIVITIES: CPT

## 2021-10-22 PROCEDURE — 99222 1ST HOSP IP/OBS MODERATE 55: CPT | Performed by: NURSE PRACTITIONER

## 2021-10-22 PROCEDURE — 97166 OT EVAL MOD COMPLEX 45 MIN: CPT

## 2021-10-22 PROCEDURE — 85027 COMPLETE CBC AUTOMATED: CPT | Performed by: NURSE PRACTITIONER

## 2021-10-22 PROCEDURE — 85027 COMPLETE CBC AUTOMATED: CPT | Performed by: INTERNAL MEDICINE

## 2021-10-22 PROCEDURE — 97161 PT EVAL LOW COMPLEX 20 MIN: CPT | Performed by: PHYSICAL THERAPIST

## 2021-10-22 PROCEDURE — 25010000002 VANCOMYCIN 10 G RECONSTITUTED SOLUTION: Performed by: FAMILY MEDICINE

## 2021-10-22 PROCEDURE — 25010000002 PIPERACILLIN SOD-TAZOBACTAM PER 1 G: Performed by: NURSE PRACTITIONER

## 2021-10-22 PROCEDURE — 25010000002 CEFAZOLIN PER 500 MG: Performed by: INTERNAL MEDICINE

## 2021-10-22 PROCEDURE — 25010000002 IOPAMIDOL 61 % SOLUTION: Performed by: INTERNAL MEDICINE

## 2021-10-22 PROCEDURE — 80053 COMPREHEN METABOLIC PANEL: CPT | Performed by: NURSE PRACTITIONER

## 2021-10-22 PROCEDURE — 36415 COLL VENOUS BLD VENIPUNCTURE: CPT | Performed by: NURSE PRACTITIONER

## 2021-10-22 RX ORDER — BUPIVACAINE HCL/0.9 % NACL/PF 0.1 %
2 PLASTIC BAG, INJECTION (ML) EPIDURAL EVERY 8 HOURS
Status: DISCONTINUED | OUTPATIENT
Start: 2021-10-22 | End: 2021-10-28 | Stop reason: HOSPADM

## 2021-10-22 RX ADMIN — FERROUS SULFATE TAB 325 MG (65 MG ELEMENTAL FE) 325 MG: 325 (65 FE) TAB at 10:25

## 2021-10-22 RX ADMIN — Medication 1 TABLET: at 12:27

## 2021-10-22 RX ADMIN — ISOSORBIDE MONONITRATE 60 MG: 60 TABLET, EXTENDED RELEASE ORAL at 10:25

## 2021-10-22 RX ADMIN — TRAMADOL HYDROCHLORIDE 50 MG: 50 TABLET, FILM COATED ORAL at 10:17

## 2021-10-22 RX ADMIN — LEFLUNOMIDE 20 MG: 20 TABLET ORAL at 10:20

## 2021-10-22 RX ADMIN — PIPERACILLIN SODIUM AND TAZOBACTAM SODIUM 3.38 G: 3; .375 INJECTION, POWDER, LYOPHILIZED, FOR SOLUTION INTRAVENOUS at 12:34

## 2021-10-22 RX ADMIN — VANCOMYCIN HYDROCHLORIDE 750 MG: 10 INJECTION, POWDER, LYOPHILIZED, FOR SOLUTION INTRAVENOUS at 05:21

## 2021-10-22 RX ADMIN — MAGNESIUM GLUCONATE 500 MG ORAL TABLET 400 MG: 500 TABLET ORAL at 10:26

## 2021-10-22 RX ADMIN — CARVEDILOL 25 MG: 25 TABLET, FILM COATED ORAL at 10:20

## 2021-10-22 RX ADMIN — PIPERACILLIN SODIUM AND TAZOBACTAM SODIUM 3.38 G: 3; .375 INJECTION, POWDER, LYOPHILIZED, FOR SOLUTION INTRAVENOUS at 03:33

## 2021-10-22 RX ADMIN — THIAMINE HCL TAB 100 MG 250 MG: 100 TAB at 10:16

## 2021-10-22 RX ADMIN — FUROSEMIDE 40 MG: 40 TABLET ORAL at 05:20

## 2021-10-22 RX ADMIN — HYDROCODONE BITARTRATE AND ACETAMINOPHEN 1 TABLET: 7.5; 325 TABLET ORAL at 05:20

## 2021-10-22 RX ADMIN — RIVAROXABAN 20 MG: 20 TABLET, FILM COATED ORAL at 10:26

## 2021-10-22 RX ADMIN — FAMOTIDINE 40 MG: 20 TABLET, FILM COATED ORAL at 10:26

## 2021-10-22 RX ADMIN — CARVEDILOL 25 MG: 25 TABLET, FILM COATED ORAL at 18:13

## 2021-10-22 RX ADMIN — Medication 10 ML: at 10:27

## 2021-10-22 RX ADMIN — VALACYCLOVIR HYDROCHLORIDE 500 MG: 500 TABLET, FILM COATED ORAL at 10:23

## 2021-10-22 RX ADMIN — POTASSIUM CHLORIDE 20 MEQ: 1.5 POWDER, FOR SOLUTION ORAL at 10:17

## 2021-10-22 RX ADMIN — SUCRALFATE 1 G: 1 TABLET ORAL at 18:13

## 2021-10-22 RX ADMIN — Medication 250 MG: at 10:24

## 2021-10-22 RX ADMIN — ASPIRIN 81 MG: 81 TABLET, COATED ORAL at 10:26

## 2021-10-22 RX ADMIN — TRAMADOL HYDROCHLORIDE 50 MG: 50 TABLET, FILM COATED ORAL at 15:27

## 2021-10-22 RX ADMIN — HYDROCODONE BITARTRATE AND ACETAMINOPHEN 1 TABLET: 7.5; 325 TABLET ORAL at 10:13

## 2021-10-22 RX ADMIN — POTASSIUM CHLORIDE 40 MEQ: 10 CAPSULE, COATED, EXTENDED RELEASE ORAL at 05:20

## 2021-10-22 RX ADMIN — SUCRALFATE 1 G: 1 TABLET ORAL at 12:27

## 2021-10-22 RX ADMIN — ISOSORBIDE MONONITRATE 60 MG: 60 TABLET, EXTENDED RELEASE ORAL at 18:13

## 2021-10-22 RX ADMIN — HYDROCODONE BITARTRATE AND ACETAMINOPHEN 1 TABLET: 7.5; 325 TABLET ORAL at 15:27

## 2021-10-22 RX ADMIN — VANCOMYCIN HYDROCHLORIDE 750 MG: 10 INJECTION, POWDER, LYOPHILIZED, FOR SOLUTION INTRAVENOUS at 15:51

## 2021-10-22 RX ADMIN — CEFAZOLIN SODIUM 2 G: 10 INJECTION, POWDER, FOR SOLUTION INTRAVENOUS at 21:30

## 2021-10-22 RX ADMIN — DOCUSATE SODIUM 100 MG: 100 CAPSULE ORAL at 10:25

## 2021-10-22 RX ADMIN — IOPAMIDOL 100 ML: 612 INJECTION, SOLUTION INTRAVENOUS at 15:08

## 2021-10-22 RX ADMIN — PANTOPRAZOLE SODIUM 40 MG: 40 TABLET, DELAYED RELEASE ORAL at 05:20

## 2021-10-22 RX ADMIN — FOLIC ACID 1 MG: 1 TABLET ORAL at 12:27

## 2021-10-22 RX ADMIN — POTASSIUM CHLORIDE 40 MEQ: 10 CAPSULE, COATED, EXTENDED RELEASE ORAL at 01:37

## 2021-10-22 RX ADMIN — LEVOTHYROXINE SODIUM 75 MCG: 75 TABLET ORAL at 05:20

## 2021-10-22 RX ADMIN — SUCRALFATE 1 G: 1 TABLET ORAL at 10:20

## 2021-10-23 ENCOUNTER — APPOINTMENT (OUTPATIENT)
Dept: ULTRASOUND IMAGING | Facility: HOSPITAL | Age: 66
End: 2021-10-23

## 2021-10-23 LAB
ANION GAP SERPL CALCULATED.3IONS-SCNC: 11 MMOL/L (ref 5–15)
BASOPHILS # BLD AUTO: 0.01 10*3/MM3 (ref 0–0.2)
BASOPHILS NFR BLD AUTO: 0.2 % (ref 0–1.5)
BUN SERPL-MCNC: 21 MG/DL (ref 8–23)
BUN/CREAT SERPL: 32.8 (ref 7–25)
CALCIUM SPEC-SCNC: 8.7 MG/DL (ref 8.6–10.5)
CHLORIDE SERPL-SCNC: 103 MMOL/L (ref 98–107)
CO2 SERPL-SCNC: 21 MMOL/L (ref 22–29)
CREAT SERPL-MCNC: 0.64 MG/DL (ref 0.57–1)
CRP SERPL-MCNC: 29.83 MG/DL (ref 0–0.5)
DEPRECATED RDW RBC AUTO: 55 FL (ref 37–54)
EOSINOPHIL # BLD AUTO: 0 10*3/MM3 (ref 0–0.4)
EOSINOPHIL NFR BLD AUTO: 0 % (ref 0.3–6.2)
ERYTHROCYTE [DISTWIDTH] IN BLOOD BY AUTOMATED COUNT: 17.7 % (ref 12.3–15.4)
GFR SERPL CREATININE-BSD FRML MDRD: 92 ML/MIN/1.73
GLUCOSE SERPL-MCNC: 96 MG/DL (ref 65–99)
HCT VFR BLD AUTO: 26.9 % (ref 34–46.6)
HGB BLD-MCNC: 8.6 G/DL (ref 12–15.9)
IMM GRANULOCYTES # BLD AUTO: 0.05 10*3/MM3 (ref 0–0.05)
IMM GRANULOCYTES NFR BLD AUTO: 0.9 % (ref 0–0.5)
LYMPHOCYTES # BLD AUTO: 1.08 10*3/MM3 (ref 0.7–3.1)
LYMPHOCYTES NFR BLD AUTO: 18.9 % (ref 19.6–45.3)
MAGNESIUM SERPL-MCNC: 2.1 MG/DL (ref 1.6–2.4)
MCH RBC QN AUTO: 27.1 PG (ref 26.6–33)
MCHC RBC AUTO-ENTMCNC: 32 G/DL (ref 31.5–35.7)
MCV RBC AUTO: 84.9 FL (ref 79–97)
MONOCYTES # BLD AUTO: 0.68 10*3/MM3 (ref 0.1–0.9)
MONOCYTES NFR BLD AUTO: 11.9 % (ref 5–12)
NEUTROPHILS NFR BLD AUTO: 3.88 10*3/MM3 (ref 1.7–7)
NEUTROPHILS NFR BLD AUTO: 68.1 % (ref 42.7–76)
NRBC BLD AUTO-RTO: 0 /100 WBC (ref 0–0.2)
PLATELET # BLD AUTO: 127 10*3/MM3 (ref 140–450)
PMV BLD AUTO: 12.4 FL (ref 6–12)
POTASSIUM SERPL-SCNC: 4 MMOL/L (ref 3.5–5.2)
RBC # BLD AUTO: 3.17 10*6/MM3 (ref 3.77–5.28)
SODIUM SERPL-SCNC: 135 MMOL/L (ref 136–145)
WBC # BLD AUTO: 5.7 10*3/MM3 (ref 3.4–10.8)

## 2021-10-23 PROCEDURE — 80048 BASIC METABOLIC PNL TOTAL CA: CPT | Performed by: INTERNAL MEDICINE

## 2021-10-23 PROCEDURE — 87205 SMEAR GRAM STAIN: CPT | Performed by: INTERNAL MEDICINE

## 2021-10-23 PROCEDURE — 85025 COMPLETE CBC W/AUTO DIFF WBC: CPT | Performed by: INTERNAL MEDICINE

## 2021-10-23 PROCEDURE — 83735 ASSAY OF MAGNESIUM: CPT | Performed by: INTERNAL MEDICINE

## 2021-10-23 PROCEDURE — 87147 CULTURE TYPE IMMUNOLOGIC: CPT | Performed by: INTERNAL MEDICINE

## 2021-10-23 PROCEDURE — 87040 BLOOD CULTURE FOR BACTERIA: CPT | Performed by: INTERNAL MEDICINE

## 2021-10-23 PROCEDURE — 86140 C-REACTIVE PROTEIN: CPT | Performed by: NEUROLOGICAL SURGERY

## 2021-10-23 PROCEDURE — 0J9L3ZX DRAINAGE OF RIGHT UPPER LEG SUBCUTANEOUS TISSUE AND FASCIA, PERCUTANEOUS APPROACH, DIAGNOSTIC: ICD-10-PCS | Performed by: RADIOLOGY

## 2021-10-23 PROCEDURE — 87070 CULTURE OTHR SPECIMN AEROBIC: CPT | Performed by: INTERNAL MEDICINE

## 2021-10-23 PROCEDURE — 75989 ABSCESS DRAINAGE UNDER X-RAY: CPT

## 2021-10-23 PROCEDURE — 25010000002 CEFAZOLIN PER 500 MG: Performed by: INTERNAL MEDICINE

## 2021-10-23 PROCEDURE — 87186 SC STD MICRODIL/AGAR DIL: CPT | Performed by: INTERNAL MEDICINE

## 2021-10-23 PROCEDURE — 99232 SBSQ HOSP IP/OBS MODERATE 35: CPT | Performed by: NEUROLOGICAL SURGERY

## 2021-10-23 RX ORDER — CLONIDINE HYDROCHLORIDE 0.1 MG/1
0.1 TABLET ORAL EVERY 6 HOURS PRN
Status: DISCONTINUED | OUTPATIENT
Start: 2021-10-23 | End: 2021-10-28 | Stop reason: HOSPADM

## 2021-10-23 RX ORDER — CLONIDINE HYDROCHLORIDE 0.1 MG/1
0.1 TABLET ORAL EVERY 6 HOURS SCHEDULED
Status: DISCONTINUED | OUTPATIENT
Start: 2021-10-23 | End: 2021-10-23

## 2021-10-23 RX ADMIN — THIAMINE HCL TAB 100 MG 250 MG: 100 TAB at 09:09

## 2021-10-23 RX ADMIN — FOLIC ACID 1 MG: 1 TABLET ORAL at 15:04

## 2021-10-23 RX ADMIN — LEFLUNOMIDE 20 MG: 20 TABLET ORAL at 09:08

## 2021-10-23 RX ADMIN — LEVOTHYROXINE SODIUM 75 MCG: 75 TABLET ORAL at 05:22

## 2021-10-23 RX ADMIN — CARVEDILOL 25 MG: 25 TABLET, FILM COATED ORAL at 09:08

## 2021-10-23 RX ADMIN — TRAMADOL HYDROCHLORIDE 50 MG: 50 TABLET, FILM COATED ORAL at 20:11

## 2021-10-23 RX ADMIN — POTASSIUM CHLORIDE 20 MEQ: 1.5 POWDER, FOR SOLUTION ORAL at 20:11

## 2021-10-23 RX ADMIN — SUCRALFATE 1 G: 1 TABLET ORAL at 09:08

## 2021-10-23 RX ADMIN — CLONIDINE HYDROCHLORIDE 0.1 MG: 0.1 TABLET ORAL at 20:11

## 2021-10-23 RX ADMIN — SUCRALFATE 1 G: 1 TABLET ORAL at 18:04

## 2021-10-23 RX ADMIN — Medication 10 ML: at 20:12

## 2021-10-23 RX ADMIN — CLONIDINE HYDROCHLORIDE 0.1 MG: 0.1 TABLET ORAL at 03:09

## 2021-10-23 RX ADMIN — SUCRALFATE 1 G: 1 TABLET ORAL at 20:10

## 2021-10-23 RX ADMIN — VALACYCLOVIR HYDROCHLORIDE 500 MG: 500 TABLET, FILM COATED ORAL at 09:09

## 2021-10-23 RX ADMIN — TRAMADOL HYDROCHLORIDE 50 MG: 50 TABLET, FILM COATED ORAL at 18:06

## 2021-10-23 RX ADMIN — TRAMADOL HYDROCHLORIDE 50 MG: 50 TABLET, FILM COATED ORAL at 09:09

## 2021-10-23 RX ADMIN — FUROSEMIDE 40 MG: 40 TABLET ORAL at 05:22

## 2021-10-23 RX ADMIN — CEFAZOLIN SODIUM 2 G: 10 INJECTION, POWDER, FOR SOLUTION INTRAVENOUS at 03:09

## 2021-10-23 RX ADMIN — CARVEDILOL 25 MG: 25 TABLET, FILM COATED ORAL at 18:04

## 2021-10-23 RX ADMIN — ISOSORBIDE MONONITRATE 60 MG: 60 TABLET, EXTENDED RELEASE ORAL at 18:04

## 2021-10-23 RX ADMIN — CEFAZOLIN SODIUM 2 G: 10 INJECTION, POWDER, FOR SOLUTION INTRAVENOUS at 20:11

## 2021-10-23 RX ADMIN — CEFAZOLIN SODIUM 2 G: 10 INJECTION, POWDER, FOR SOLUTION INTRAVENOUS at 09:08

## 2021-10-23 RX ADMIN — FERROUS SULFATE TAB 325 MG (65 MG ELEMENTAL FE) 325 MG: 325 (65 FE) TAB at 09:08

## 2021-10-23 RX ADMIN — SUCRALFATE 1 G: 1 TABLET ORAL at 12:21

## 2021-10-23 RX ADMIN — Medication 10 ML: at 09:10

## 2021-10-23 RX ADMIN — ASPIRIN 81 MG: 81 TABLET, COATED ORAL at 09:09

## 2021-10-23 RX ADMIN — FAMOTIDINE 40 MG: 20 TABLET, FILM COATED ORAL at 09:09

## 2021-10-23 RX ADMIN — Medication 1 TABLET: at 15:04

## 2021-10-23 RX ADMIN — DOCUSATE SODIUM 100 MG: 100 CAPSULE ORAL at 09:09

## 2021-10-23 RX ADMIN — Medication 250 MG: at 09:09

## 2021-10-23 RX ADMIN — PANTOPRAZOLE SODIUM 40 MG: 40 TABLET, DELAYED RELEASE ORAL at 05:22

## 2021-10-23 RX ADMIN — ISOSORBIDE MONONITRATE 60 MG: 60 TABLET, EXTENDED RELEASE ORAL at 09:09

## 2021-10-23 RX ADMIN — DOCUSATE SODIUM 100 MG: 100 CAPSULE ORAL at 20:10

## 2021-10-23 RX ADMIN — POTASSIUM CHLORIDE 20 MEQ: 1.5 POWDER, FOR SOLUTION ORAL at 09:08

## 2021-10-23 RX ADMIN — MAGNESIUM GLUCONATE 500 MG ORAL TABLET 400 MG: 500 TABLET ORAL at 09:09

## 2021-10-23 RX ADMIN — ACETAMINOPHEN 650 MG: 325 TABLET, FILM COATED ORAL at 03:12

## 2021-10-24 ENCOUNTER — ANESTHESIA (OUTPATIENT)
Dept: PERIOP | Facility: HOSPITAL | Age: 66
End: 2021-10-24

## 2021-10-24 ENCOUNTER — ANESTHESIA EVENT (OUTPATIENT)
Dept: PERIOP | Facility: HOSPITAL | Age: 66
End: 2021-10-24

## 2021-10-24 LAB
ANION GAP SERPL CALCULATED.3IONS-SCNC: 9 MMOL/L (ref 5–15)
BACTERIA SPEC AEROBE CULT: ABNORMAL
BUN SERPL-MCNC: 19 MG/DL (ref 8–23)
BUN/CREAT SERPL: 38 (ref 7–25)
CALCIUM SPEC-SCNC: 8.4 MG/DL (ref 8.6–10.5)
CHLORIDE SERPL-SCNC: 100 MMOL/L (ref 98–107)
CO2 SERPL-SCNC: 27 MMOL/L (ref 22–29)
CREAT SERPL-MCNC: 0.5 MG/DL (ref 0.57–1)
DEPRECATED RDW RBC AUTO: 55.3 FL (ref 37–54)
ERYTHROCYTE [DISTWIDTH] IN BLOOD BY AUTOMATED COUNT: 17.7 % (ref 12.3–15.4)
GFR SERPL CREATININE-BSD FRML MDRD: 123 ML/MIN/1.73
GLUCOSE SERPL-MCNC: 100 MG/DL (ref 65–99)
GRAM STN SPEC: ABNORMAL
HCT VFR BLD AUTO: 27.4 % (ref 34–46.6)
HGB BLD-MCNC: 8.8 G/DL (ref 12–15.9)
ISOLATED FROM: ABNORMAL
ISOLATED FROM: ABNORMAL
MCH RBC QN AUTO: 27.2 PG (ref 26.6–33)
MCHC RBC AUTO-ENTMCNC: 32.1 G/DL (ref 31.5–35.7)
MCV RBC AUTO: 84.6 FL (ref 79–97)
PLATELET # BLD AUTO: 140 10*3/MM3 (ref 140–450)
PMV BLD AUTO: 12.2 FL (ref 6–12)
POTASSIUM SERPL-SCNC: 3.5 MMOL/L (ref 3.5–5.2)
RBC # BLD AUTO: 3.24 10*6/MM3 (ref 3.77–5.28)
SARS-COV-2 RNA PNL SPEC NAA+PROBE: NOT DETECTED
SODIUM SERPL-SCNC: 136 MMOL/L (ref 136–145)
WBC # BLD AUTO: 6 10*3/MM3 (ref 3.4–10.8)

## 2021-10-24 PROCEDURE — 87635 SARS-COV-2 COVID-19 AMP PRB: CPT | Performed by: SURGERY

## 2021-10-24 PROCEDURE — 25010000002 CEFAZOLIN PER 500 MG: Performed by: INTERNAL MEDICINE

## 2021-10-24 PROCEDURE — 25010000003 CEFAZOLIN PER 500 MG: Performed by: SURGERY

## 2021-10-24 PROCEDURE — 25010000002 PROPOFOL 1000 MG/100ML EMULSION: Performed by: NURSE ANESTHETIST, CERTIFIED REGISTERED

## 2021-10-24 PROCEDURE — 80048 BASIC METABOLIC PNL TOTAL CA: CPT | Performed by: INTERNAL MEDICINE

## 2021-10-24 PROCEDURE — 25010000002 PROPOFOL 10 MG/ML EMULSION: Performed by: NURSE ANESTHETIST, CERTIFIED REGISTERED

## 2021-10-24 PROCEDURE — 87040 BLOOD CULTURE FOR BACTERIA: CPT | Performed by: INTERNAL MEDICINE

## 2021-10-24 PROCEDURE — 85027 COMPLETE CBC AUTOMATED: CPT | Performed by: INTERNAL MEDICINE

## 2021-10-24 PROCEDURE — 25010000002 FENTANYL CITRATE (PF) 100 MCG/2ML SOLUTION: Performed by: NURSE ANESTHETIST, CERTIFIED REGISTERED

## 2021-10-24 PROCEDURE — 25010000002 ONDANSETRON PER 1 MG: Performed by: NURSE ANESTHETIST, CERTIFIED REGISTERED

## 2021-10-24 PROCEDURE — 0J9L0ZZ DRAINAGE OF RIGHT UPPER LEG SUBCUTANEOUS TISSUE AND FASCIA, OPEN APPROACH: ICD-10-PCS | Performed by: SURGERY

## 2021-10-24 RX ORDER — SODIUM CHLORIDE, SODIUM LACTATE, POTASSIUM CHLORIDE, CALCIUM CHLORIDE 600; 310; 30; 20 MG/100ML; MG/100ML; MG/100ML; MG/100ML
INJECTION, SOLUTION INTRAVENOUS CONTINUOUS PRN
Status: DISCONTINUED | OUTPATIENT
Start: 2021-10-24 | End: 2021-10-24 | Stop reason: SURG

## 2021-10-24 RX ORDER — PROPOFOL 10 MG/ML
INJECTION, EMULSION INTRAVENOUS CONTINUOUS PRN
Status: DISCONTINUED | OUTPATIENT
Start: 2021-10-24 | End: 2021-10-24 | Stop reason: SURG

## 2021-10-24 RX ORDER — PROPOFOL 10 MG/ML
VIAL (ML) INTRAVENOUS AS NEEDED
Status: DISCONTINUED | OUTPATIENT
Start: 2021-10-24 | End: 2021-10-24 | Stop reason: SURG

## 2021-10-24 RX ORDER — OXYCODONE AND ACETAMINOPHEN 10; 325 MG/1; MG/1
1 TABLET ORAL ONCE AS NEEDED
Status: COMPLETED | OUTPATIENT
Start: 2021-10-24 | End: 2021-10-24

## 2021-10-24 RX ORDER — LABETALOL HYDROCHLORIDE 5 MG/ML
5 INJECTION, SOLUTION INTRAVENOUS
Status: DISCONTINUED | OUTPATIENT
Start: 2021-10-24 | End: 2021-10-24 | Stop reason: HOSPADM

## 2021-10-24 RX ORDER — ONDANSETRON 2 MG/ML
4 INJECTION INTRAMUSCULAR; INTRAVENOUS AS NEEDED
Status: DISCONTINUED | OUTPATIENT
Start: 2021-10-24 | End: 2021-10-24 | Stop reason: HOSPADM

## 2021-10-24 RX ORDER — FLUMAZENIL 0.1 MG/ML
0.2 INJECTION INTRAVENOUS AS NEEDED
Status: DISCONTINUED | OUTPATIENT
Start: 2021-10-24 | End: 2021-10-24 | Stop reason: HOSPADM

## 2021-10-24 RX ORDER — IBUPROFEN 600 MG/1
600 TABLET ORAL ONCE AS NEEDED
Status: DISCONTINUED | OUTPATIENT
Start: 2021-10-24 | End: 2021-10-24 | Stop reason: HOSPADM

## 2021-10-24 RX ORDER — FENTANYL CITRATE 50 UG/ML
INJECTION, SOLUTION INTRAMUSCULAR; INTRAVENOUS AS NEEDED
Status: DISCONTINUED | OUTPATIENT
Start: 2021-10-24 | End: 2021-10-24 | Stop reason: SURG

## 2021-10-24 RX ORDER — CEFAZOLIN SODIUM 1 G/3ML
INJECTION, POWDER, FOR SOLUTION INTRAMUSCULAR; INTRAVENOUS AS NEEDED
Status: DISCONTINUED | OUTPATIENT
Start: 2021-10-24 | End: 2021-10-24 | Stop reason: HOSPADM

## 2021-10-24 RX ORDER — FENTANYL CITRATE 50 UG/ML
25 INJECTION, SOLUTION INTRAMUSCULAR; INTRAVENOUS
Status: DISCONTINUED | OUTPATIENT
Start: 2021-10-24 | End: 2021-10-24 | Stop reason: HOSPADM

## 2021-10-24 RX ORDER — NALOXONE HCL 0.4 MG/ML
0.04 VIAL (ML) INJECTION AS NEEDED
Status: DISCONTINUED | OUTPATIENT
Start: 2021-10-24 | End: 2021-10-24 | Stop reason: HOSPADM

## 2021-10-24 RX ORDER — MAGNESIUM HYDROXIDE 1200 MG/15ML
LIQUID ORAL AS NEEDED
Status: DISCONTINUED | OUTPATIENT
Start: 2021-10-24 | End: 2021-10-24 | Stop reason: HOSPADM

## 2021-10-24 RX ORDER — PROPOFOL 10 MG/ML
INJECTION, EMULSION INTRAVENOUS AS NEEDED
Status: DISCONTINUED | OUTPATIENT
Start: 2021-10-24 | End: 2021-10-24

## 2021-10-24 RX ADMIN — ISOSORBIDE MONONITRATE 60 MG: 60 TABLET, EXTENDED RELEASE ORAL at 17:05

## 2021-10-24 RX ADMIN — MAGNESIUM GLUCONATE 500 MG ORAL TABLET 400 MG: 500 TABLET ORAL at 11:27

## 2021-10-24 RX ADMIN — FUROSEMIDE 40 MG: 40 TABLET ORAL at 06:05

## 2021-10-24 RX ADMIN — CARVEDILOL 25 MG: 25 TABLET, FILM COATED ORAL at 17:05

## 2021-10-24 RX ADMIN — PANTOPRAZOLE SODIUM 40 MG: 40 TABLET, DELAYED RELEASE ORAL at 06:05

## 2021-10-24 RX ADMIN — POTASSIUM CHLORIDE 20 MEQ: 1.5 POWDER, FOR SOLUTION ORAL at 20:15

## 2021-10-24 RX ADMIN — PROPOFOL 40 MG: 10 INJECTION, EMULSION INTRAVENOUS at 08:13

## 2021-10-24 RX ADMIN — VALACYCLOVIR HYDROCHLORIDE 500 MG: 500 TABLET, FILM COATED ORAL at 09:38

## 2021-10-24 RX ADMIN — FAMOTIDINE 40 MG: 20 TABLET, FILM COATED ORAL at 09:37

## 2021-10-24 RX ADMIN — DOCUSATE SODIUM 100 MG: 100 CAPSULE ORAL at 20:15

## 2021-10-24 RX ADMIN — Medication 1 TABLET: at 11:27

## 2021-10-24 RX ADMIN — ASPIRIN 81 MG: 81 TABLET, COATED ORAL at 11:27

## 2021-10-24 RX ADMIN — CEFAZOLIN SODIUM 2 G: 10 INJECTION, POWDER, FOR SOLUTION INTRAVENOUS at 08:20

## 2021-10-24 RX ADMIN — HYDROCODONE BITARTRATE AND ACETAMINOPHEN 1 TABLET: 7.5; 325 TABLET ORAL at 02:08

## 2021-10-24 RX ADMIN — CEFAZOLIN SODIUM 2 G: 10 INJECTION, POWDER, FOR SOLUTION INTRAVENOUS at 17:05

## 2021-10-24 RX ADMIN — CARVEDILOL 25 MG: 25 TABLET, FILM COATED ORAL at 09:37

## 2021-10-24 RX ADMIN — FERROUS SULFATE TAB 325 MG (65 MG ELEMENTAL FE) 325 MG: 325 (65 FE) TAB at 09:37

## 2021-10-24 RX ADMIN — ISOSORBIDE MONONITRATE 60 MG: 60 TABLET, EXTENDED RELEASE ORAL at 09:37

## 2021-10-24 RX ADMIN — PROPOFOL 100 MCG/KG/MIN: 10 INJECTION, EMULSION INTRAVENOUS at 08:13

## 2021-10-24 RX ADMIN — TRAMADOL HYDROCHLORIDE 50 MG: 50 TABLET, FILM COATED ORAL at 20:15

## 2021-10-24 RX ADMIN — LEFLUNOMIDE 20 MG: 20 TABLET ORAL at 11:27

## 2021-10-24 RX ADMIN — ONDANSETRON 4 MG: 2 INJECTION INTRAMUSCULAR; INTRAVENOUS at 09:01

## 2021-10-24 RX ADMIN — FENTANYL CITRATE 100 MCG: 50 INJECTION, SOLUTION INTRAMUSCULAR; INTRAVENOUS at 08:04

## 2021-10-24 RX ADMIN — CEFAZOLIN SODIUM 2 G: 10 INJECTION, POWDER, FOR SOLUTION INTRAVENOUS at 02:05

## 2021-10-24 RX ADMIN — DOCUSATE SODIUM 100 MG: 100 CAPSULE ORAL at 11:27

## 2021-10-24 RX ADMIN — TRAMADOL HYDROCHLORIDE 50 MG: 50 TABLET, FILM COATED ORAL at 17:05

## 2021-10-24 RX ADMIN — LEVOTHYROXINE SODIUM 75 MCG: 75 TABLET ORAL at 06:05

## 2021-10-24 RX ADMIN — SODIUM CHLORIDE, POTASSIUM CHLORIDE, SODIUM LACTATE AND CALCIUM CHLORIDE: 600; 310; 30; 20 INJECTION, SOLUTION INTRAVENOUS at 08:03

## 2021-10-24 RX ADMIN — Medication 250 MG: at 11:27

## 2021-10-24 RX ADMIN — SUCRALFATE 1 G: 1 TABLET ORAL at 20:15

## 2021-10-24 RX ADMIN — OXYCODONE AND ACETAMINOPHEN 1 TABLET: 325; 10 TABLET ORAL at 09:01

## 2021-10-24 RX ADMIN — SUCRALFATE 1 G: 1 TABLET ORAL at 17:05

## 2021-10-24 RX ADMIN — Medication 10 ML: at 20:15

## 2021-10-24 RX ADMIN — POTASSIUM CHLORIDE 20 MEQ: 1.5 POWDER, FOR SOLUTION ORAL at 09:38

## 2021-10-24 RX ADMIN — FOLIC ACID 1 MG: 1 TABLET ORAL at 11:27

## 2021-10-24 RX ADMIN — SUCRALFATE 1 G: 1 TABLET ORAL at 11:29

## 2021-10-24 RX ADMIN — THIAMINE HCL TAB 100 MG 250 MG: 100 TAB at 11:27

## 2021-10-24 NOTE — ANESTHESIA PREPROCEDURE EVALUATION
Anesthesia Evaluation     Patient summary reviewed and Nursing notes reviewed   no history of anesthetic complications:  NPO Solid Status: > 8 hours  NPO Liquid Status: > 8 hours           Airway   Mallampati: I  TM distance: >3 FB  Neck ROM: full  No difficulty expected  Dental - normal exam     Pulmonary    (+) asthma,  Cardiovascular   Exercise tolerance: poor (<4 METS)    Patient on routine beta blocker and Beta blocker given within 24 hours of surgery    (+) pacemaker (Medtronic ) pacemaker, hypertension well controlled 2 medications or greater, past MI  >12 months, CAD, cardiac stents (2 stents, most recent 6 years ago) more than 12 months ago dysrhythmias (SSS), DVT (Acute--There is evidence of deep venous thrombosis and superficial 6/23) resolved, hyperlipidemia,     ROS comment: medtronic interrogation report reviewed from 2/9/21  15% a paced        Echo 2/10/21  · Estimated left ventricular EF = 65% Left ventricular systolic function is normal.  · Normal right ventricular cavity size and systolic function noted.  · All valves appear structurally and functionally normal with no evidence of any hemodynamically significant disease.  · There is no evidence of right to left shunt on bubble study.  · There is no evidence of intracardiac mass or thrombus.       ESTEFANI done to eval for valve vegetations which showed none    Neuro/Psych  (+) headaches, syncope, numbness,     GI/Hepatic/Renal/Endo    (+)   thyroid problem hypothyroidism  (-) liver disease, no renal disease, diabetes    Musculoskeletal     (+) gait problem,   Abdominal    Substance History - negative use     OB/GYN          Other   arthritis (rheumatoid arthritis), blood dyscrasia anemia,       Other Comment: Sjogren's                   Anesthesia Plan    ASA 3 - emergent     MAC   (Pt with recurrent infection of right leg wound.   Dr. Turner request MAC for case)  intravenous induction     Anesthetic plan, all risks, benefits, and alternatives have  been provided, discussed and informed consent has been obtained with: patient.

## 2021-10-25 PROCEDURE — 97535 SELF CARE MNGMENT TRAINING: CPT

## 2021-10-25 PROCEDURE — 97530 THERAPEUTIC ACTIVITIES: CPT

## 2021-10-25 PROCEDURE — 25010000002 CEFAZOLIN PER 500 MG: Performed by: INTERNAL MEDICINE

## 2021-10-25 PROCEDURE — 99231 SBSQ HOSP IP/OBS SF/LOW 25: CPT | Performed by: NURSE PRACTITIONER

## 2021-10-25 RX ADMIN — TRAMADOL HYDROCHLORIDE 50 MG: 50 TABLET, FILM COATED ORAL at 08:29

## 2021-10-25 RX ADMIN — DOCUSATE SODIUM 100 MG: 100 CAPSULE ORAL at 08:29

## 2021-10-25 RX ADMIN — SUCRALFATE 1 G: 1 TABLET ORAL at 11:54

## 2021-10-25 RX ADMIN — LEFLUNOMIDE 20 MG: 20 TABLET ORAL at 08:29

## 2021-10-25 RX ADMIN — CEFAZOLIN SODIUM 2 G: 10 INJECTION, POWDER, FOR SOLUTION INTRAVENOUS at 10:23

## 2021-10-25 RX ADMIN — SUCRALFATE 1 G: 1 TABLET ORAL at 20:19

## 2021-10-25 RX ADMIN — CARVEDILOL 25 MG: 25 TABLET, FILM COATED ORAL at 17:22

## 2021-10-25 RX ADMIN — CEFAZOLIN SODIUM 2 G: 10 INJECTION, POWDER, FOR SOLUTION INTRAVENOUS at 17:22

## 2021-10-25 RX ADMIN — FOLIC ACID 1 MG: 1 TABLET ORAL at 15:09

## 2021-10-25 RX ADMIN — ISOSORBIDE MONONITRATE 60 MG: 60 TABLET, EXTENDED RELEASE ORAL at 08:29

## 2021-10-25 RX ADMIN — FERROUS SULFATE TAB 325 MG (65 MG ELEMENTAL FE) 325 MG: 325 (65 FE) TAB at 08:29

## 2021-10-25 RX ADMIN — SUCRALFATE 1 G: 1 TABLET ORAL at 08:29

## 2021-10-25 RX ADMIN — POTASSIUM CHLORIDE 20 MEQ: 1.5 POWDER, FOR SOLUTION ORAL at 20:19

## 2021-10-25 RX ADMIN — FUROSEMIDE 40 MG: 40 TABLET ORAL at 05:46

## 2021-10-25 RX ADMIN — CEFAZOLIN SODIUM 2 G: 10 INJECTION, POWDER, FOR SOLUTION INTRAVENOUS at 01:35

## 2021-10-25 RX ADMIN — HYDROCODONE BITARTRATE AND ACETAMINOPHEN 1 TABLET: 7.5; 325 TABLET ORAL at 05:48

## 2021-10-25 RX ADMIN — LIDOCAINE 1 PATCH: 50 PATCH CUTANEOUS at 17:22

## 2021-10-25 RX ADMIN — Medication 1 TABLET: at 15:09

## 2021-10-25 RX ADMIN — MAGNESIUM GLUCONATE 500 MG ORAL TABLET 400 MG: 500 TABLET ORAL at 08:29

## 2021-10-25 RX ADMIN — FAMOTIDINE 40 MG: 20 TABLET, FILM COATED ORAL at 08:29

## 2021-10-25 RX ADMIN — HYDROCODONE BITARTRATE AND ACETAMINOPHEN 1 TABLET: 7.5; 325 TABLET ORAL at 19:01

## 2021-10-25 RX ADMIN — SUCRALFATE 1 G: 1 TABLET ORAL at 17:22

## 2021-10-25 RX ADMIN — Medication 250 MG: at 08:29

## 2021-10-25 RX ADMIN — HYDROCODONE BITARTRATE AND ACETAMINOPHEN 1 TABLET: 7.5; 325 TABLET ORAL at 15:09

## 2021-10-25 RX ADMIN — VALACYCLOVIR HYDROCHLORIDE 500 MG: 500 TABLET, FILM COATED ORAL at 08:29

## 2021-10-25 RX ADMIN — PANTOPRAZOLE SODIUM 40 MG: 40 TABLET, DELAYED RELEASE ORAL at 05:46

## 2021-10-25 RX ADMIN — HYDROCODONE BITARTRATE AND ACETAMINOPHEN 1 TABLET: 7.5; 325 TABLET ORAL at 01:37

## 2021-10-25 RX ADMIN — TRAMADOL HYDROCHLORIDE 50 MG: 50 TABLET, FILM COATED ORAL at 16:05

## 2021-10-25 RX ADMIN — Medication 10 ML: at 08:30

## 2021-10-25 RX ADMIN — ASPIRIN 81 MG: 81 TABLET, COATED ORAL at 08:29

## 2021-10-25 RX ADMIN — HYDROCODONE BITARTRATE AND ACETAMINOPHEN 1 TABLET: 7.5; 325 TABLET ORAL at 10:23

## 2021-10-25 RX ADMIN — LEVOTHYROXINE SODIUM 75 MCG: 75 TABLET ORAL at 05:46

## 2021-10-25 RX ADMIN — TRAMADOL HYDROCHLORIDE 50 MG: 50 TABLET, FILM COATED ORAL at 20:19

## 2021-10-25 RX ADMIN — CARVEDILOL 25 MG: 25 TABLET, FILM COATED ORAL at 08:29

## 2021-10-25 RX ADMIN — THIAMINE HCL TAB 100 MG 250 MG: 100 TAB at 08:29

## 2021-10-25 RX ADMIN — DOCUSATE SODIUM 100 MG: 100 CAPSULE ORAL at 20:19

## 2021-10-25 RX ADMIN — ISOSORBIDE MONONITRATE 60 MG: 60 TABLET, EXTENDED RELEASE ORAL at 17:22

## 2021-10-25 RX ADMIN — POTASSIUM CHLORIDE 20 MEQ: 1.5 POWDER, FOR SOLUTION ORAL at 08:29

## 2021-10-25 RX ADMIN — Medication 10 ML: at 20:21

## 2021-10-25 NOTE — ANESTHESIA POSTPROCEDURE EVALUATION
Patient: Tawny Shin    Procedure Summary     Date: 10/24/21 Room / Location: [facility] OR [address] [place] [address] [facility]    Anesthesia Start: 0803 Anesthesia Stop: 0837    Procedure: INCISION AND DRAINAGE LOWER EXTREMITY (Right ) Diagnosis:     Surgeons: Amadeo Turner MD Provider: Andres White CRNA    Anesthesia Type: general ASA Status: 3 - Emergent          Anesthesia Type: general    Vitals  Vitals Value Taken Time   /65 10/24/21 0903   Temp 97.8 °F (36.6 °C) 10/24/21 0908   Pulse 60 10/24/21 0909   Resp 14 10/24/21 0908   SpO2 94 % 10/24/21 0909   Vitals shown include unvalidated device data.        Post Anesthesia Care and Evaluation    Patient location during evaluation: PACU  Patient participation: complete - patient participated  Level of consciousness: awake and alert  Pain management: adequate  Airway patency: patent  Anesthetic complications: No anesthetic complications  PONV Status: none  Cardiovascular status: acceptable and stable  Respiratory status: acceptable and unassisted  Hydration status: acceptable

## 2021-10-26 ENCOUNTER — ANESTHESIA EVENT (OUTPATIENT)
Dept: PERIOP | Facility: HOSPITAL | Age: 66
End: 2021-10-26

## 2021-10-26 ENCOUNTER — ANESTHESIA (OUTPATIENT)
Dept: PERIOP | Facility: HOSPITAL | Age: 66
End: 2021-10-26

## 2021-10-26 ENCOUNTER — APPOINTMENT (OUTPATIENT)
Dept: GENERAL RADIOLOGY | Facility: HOSPITAL | Age: 66
End: 2021-10-26

## 2021-10-26 LAB
ALBUMIN SERPL-MCNC: 2.6 G/DL (ref 3.5–5.2)
ALBUMIN/GLOB SERPL: 0.7 G/DL
ALP SERPL-CCNC: 114 U/L (ref 39–117)
ALT SERPL W P-5'-P-CCNC: <5 U/L (ref 1–33)
ANION GAP SERPL CALCULATED.3IONS-SCNC: 8 MMOL/L (ref 5–15)
AST SERPL-CCNC: 19 U/L (ref 1–32)
BACTERIA FLD CULT: ABNORMAL
BASOPHILS # BLD AUTO: 0.03 10*3/MM3 (ref 0–0.2)
BASOPHILS NFR BLD AUTO: 0.4 % (ref 0–1.5)
BILIRUB SERPL-MCNC: 1.1 MG/DL (ref 0–1.2)
BUN SERPL-MCNC: 11 MG/DL (ref 8–23)
BUN/CREAT SERPL: 29.7 (ref 7–25)
CALCIUM SPEC-SCNC: 8.1 MG/DL (ref 8.6–10.5)
CHLORIDE SERPL-SCNC: 96 MMOL/L (ref 98–107)
CO2 SERPL-SCNC: 29 MMOL/L (ref 22–29)
CREAT SERPL-MCNC: 0.37 MG/DL (ref 0.57–1)
DEPRECATED RDW RBC AUTO: 53.4 FL (ref 37–54)
EOSINOPHIL # BLD AUTO: 0.06 10*3/MM3 (ref 0–0.4)
EOSINOPHIL NFR BLD AUTO: 0.7 % (ref 0.3–6.2)
ERYTHROCYTE [DISTWIDTH] IN BLOOD BY AUTOMATED COUNT: 17.8 % (ref 12.3–15.4)
FUNGUS WND CULT: NORMAL
GFR SERPL CREATININE-BSD FRML MDRD: >150 ML/MIN/1.73
GLOBULIN UR ELPH-MCNC: 3.9 GM/DL
GLUCOSE SERPL-MCNC: 96 MG/DL (ref 65–99)
GRAM STN SPEC: ABNORMAL
GRAM STN SPEC: ABNORMAL
HCT VFR BLD AUTO: 29.4 % (ref 34–46.6)
HGB BLD-MCNC: 9.7 G/DL (ref 12–15.9)
IMM GRANULOCYTES # BLD AUTO: 0.31 10*3/MM3 (ref 0–0.05)
IMM GRANULOCYTES NFR BLD AUTO: 3.8 % (ref 0–0.5)
INR PPP: 1.82 (ref 0.91–1.09)
LYMPHOCYTES # BLD AUTO: 1.33 10*3/MM3 (ref 0.7–3.1)
LYMPHOCYTES NFR BLD AUTO: 16.2 % (ref 19.6–45.3)
MCH RBC QN AUTO: 27.2 PG (ref 26.6–33)
MCHC RBC AUTO-ENTMCNC: 33 G/DL (ref 31.5–35.7)
MCV RBC AUTO: 82.4 FL (ref 79–97)
MONOCYTES # BLD AUTO: 0.87 10*3/MM3 (ref 0.1–0.9)
MONOCYTES NFR BLD AUTO: 10.6 % (ref 5–12)
NEUTROPHILS NFR BLD AUTO: 5.63 10*3/MM3 (ref 1.7–7)
NEUTROPHILS NFR BLD AUTO: 68.3 % (ref 42.7–76)
NRBC BLD AUTO-RTO: 0 /100 WBC (ref 0–0.2)
PLATELET # BLD AUTO: 222 10*3/MM3 (ref 140–450)
PMV BLD AUTO: 10.7 FL (ref 6–12)
POTASSIUM SERPL-SCNC: 3.3 MMOL/L (ref 3.5–5.2)
PROT SERPL-MCNC: 6.5 G/DL (ref 6–8.5)
PROTHROMBIN TIME: 20.4 SECONDS (ref 11.9–14.6)
RBC # BLD AUTO: 3.57 10*6/MM3 (ref 3.77–5.28)
SODIUM SERPL-SCNC: 133 MMOL/L (ref 136–145)
WBC # BLD AUTO: 8.23 10*3/MM3 (ref 3.4–10.8)

## 2021-10-26 PROCEDURE — 99024 POSTOP FOLLOW-UP VISIT: CPT | Performed by: NURSE PRACTITIONER

## 2021-10-26 PROCEDURE — 25010000002 DEXAMETHASONE PER 1 MG: Performed by: NURSE ANESTHETIST, CERTIFIED REGISTERED

## 2021-10-26 PROCEDURE — 25010000002 IOPAMIDOL 61 % SOLUTION: Performed by: NEUROLOGICAL SURGERY

## 2021-10-26 PROCEDURE — S0260 H&P FOR SURGERY: HCPCS | Performed by: NURSE PRACTITIONER

## 2021-10-26 PROCEDURE — 85610 PROTHROMBIN TIME: CPT | Performed by: NURSE PRACTITIONER

## 2021-10-26 PROCEDURE — 25010000002 CEFAZOLIN PER 500 MG: Performed by: INTERNAL MEDICINE

## 2021-10-26 PROCEDURE — 22513 PERQ VERTEBRAL AUGMENTATION: CPT | Performed by: NEUROLOGICAL SURGERY

## 2021-10-26 PROCEDURE — 80053 COMPREHEN METABOLIC PANEL: CPT | Performed by: NURSE PRACTITIONER

## 2021-10-26 PROCEDURE — 25010000002 ONDANSETRON PER 1 MG: Performed by: NURSE ANESTHETIST, CERTIFIED REGISTERED

## 2021-10-26 PROCEDURE — 25010000002 ONDANSETRON PER 1 MG: Performed by: ANESTHESIOLOGY

## 2021-10-26 PROCEDURE — 25010000002 FENTANYL CITRATE (PF) 100 MCG/2ML SOLUTION: Performed by: NURSE ANESTHETIST, CERTIFIED REGISTERED

## 2021-10-26 PROCEDURE — 0PU43JZ SUPPLEMENT THORACIC VERTEBRA WITH SYNTHETIC SUBSTITUTE, PERCUTANEOUS APPROACH: ICD-10-PCS | Performed by: NEUROLOGICAL SURGERY

## 2021-10-26 PROCEDURE — 88307 TISSUE EXAM BY PATHOLOGIST: CPT | Performed by: NEUROLOGICAL SURGERY

## 2021-10-26 PROCEDURE — C1713 ANCHOR/SCREW BN/BN,TIS/BN: HCPCS | Performed by: NEUROLOGICAL SURGERY

## 2021-10-26 PROCEDURE — 72070 X-RAY EXAM THORAC SPINE 2VWS: CPT

## 2021-10-26 PROCEDURE — 25010000002 HYDRALAZINE PER 20 MG: Performed by: INTERNAL MEDICINE

## 2021-10-26 PROCEDURE — 25010000002 CEFAZOLIN PER 500 MG: Performed by: NURSE PRACTITIONER

## 2021-10-26 PROCEDURE — 85025 COMPLETE CBC W/AUTO DIFF WBC: CPT | Performed by: NURSE PRACTITIONER

## 2021-10-26 PROCEDURE — 0PS43ZZ REPOSITION THORACIC VERTEBRA, PERCUTANEOUS APPROACH: ICD-10-PCS | Performed by: NEUROLOGICAL SURGERY

## 2021-10-26 PROCEDURE — 25010000002 HYDROMORPHONE PER 4 MG: Performed by: INTERNAL MEDICINE

## 2021-10-26 PROCEDURE — 25010000002 FENTANYL CITRATE (PF) 50 MCG/ML SOLUTION: Performed by: ANESTHESIOLOGY

## 2021-10-26 PROCEDURE — 25010000002 PROPOFOL 10 MG/ML EMULSION: Performed by: NURSE ANESTHETIST, CERTIFIED REGISTERED

## 2021-10-26 PROCEDURE — 94799 UNLISTED PULMONARY SVC/PX: CPT

## 2021-10-26 PROCEDURE — 88311 DECALCIFY TISSUE: CPT | Performed by: NEUROLOGICAL SURGERY

## 2021-10-26 PROCEDURE — 76000 FLUOROSCOPY <1 HR PHYS/QHP: CPT

## 2021-10-26 PROCEDURE — 0PB43ZX EXCISION OF THORACIC VERTEBRA, PERCUTANEOUS APPROACH, DIAGNOSTIC: ICD-10-PCS | Performed by: NEUROLOGICAL SURGERY

## 2021-10-26 DEVICE — BONE CEMENT C01B HV-R WITH MIXER  US
Type: IMPLANTABLE DEVICE | Site: THORACIC | Status: FUNCTIONAL
Brand: KYPHON® HV-R® BONE CEMENT AND KYPHON® MIXER PACK

## 2021-10-26 RX ORDER — FENTANYL CITRATE 50 UG/ML
INJECTION, SOLUTION INTRAMUSCULAR; INTRAVENOUS AS NEEDED
Status: DISCONTINUED | OUTPATIENT
Start: 2021-10-26 | End: 2021-10-26 | Stop reason: SURG

## 2021-10-26 RX ORDER — OXYCODONE AND ACETAMINOPHEN 10; 325 MG/1; MG/1
1 TABLET ORAL ONCE AS NEEDED
Status: COMPLETED | OUTPATIENT
Start: 2021-10-26 | End: 2021-10-26

## 2021-10-26 RX ORDER — IBUPROFEN 600 MG/1
600 TABLET ORAL ONCE AS NEEDED
Status: DISCONTINUED | OUTPATIENT
Start: 2021-10-26 | End: 2021-10-26 | Stop reason: HOSPADM

## 2021-10-26 RX ORDER — SODIUM CHLORIDE 0.9 % (FLUSH) 0.9 %
10 SYRINGE (ML) INJECTION EVERY 12 HOURS SCHEDULED
Status: DISCONTINUED | OUTPATIENT
Start: 2021-10-26 | End: 2021-10-26 | Stop reason: HOSPADM

## 2021-10-26 RX ORDER — NEOSTIGMINE METHYLSULFATE 5 MG/5 ML
SYRINGE (ML) INTRAVENOUS AS NEEDED
Status: DISCONTINUED | OUTPATIENT
Start: 2021-10-26 | End: 2021-10-26 | Stop reason: SURG

## 2021-10-26 RX ORDER — LABETALOL HYDROCHLORIDE 5 MG/ML
5 INJECTION, SOLUTION INTRAVENOUS
Status: DISCONTINUED | OUTPATIENT
Start: 2021-10-26 | End: 2021-10-26 | Stop reason: HOSPADM

## 2021-10-26 RX ORDER — DEXAMETHASONE SODIUM PHOSPHATE 4 MG/ML
INJECTION, SOLUTION INTRA-ARTICULAR; INTRALESIONAL; INTRAMUSCULAR; INTRAVENOUS; SOFT TISSUE AS NEEDED
Status: DISCONTINUED | OUTPATIENT
Start: 2021-10-26 | End: 2021-10-26 | Stop reason: SURG

## 2021-10-26 RX ORDER — HYDRALAZINE HYDROCHLORIDE 20 MG/ML
20 INJECTION INTRAMUSCULAR; INTRAVENOUS ONCE
Status: COMPLETED | OUTPATIENT
Start: 2021-10-26 | End: 2021-10-26

## 2021-10-26 RX ORDER — PROPOFOL 10 MG/ML
VIAL (ML) INTRAVENOUS AS NEEDED
Status: DISCONTINUED | OUTPATIENT
Start: 2021-10-26 | End: 2021-10-26 | Stop reason: SURG

## 2021-10-26 RX ORDER — LIDOCAINE HYDROCHLORIDE 20 MG/ML
INJECTION, SOLUTION EPIDURAL; INFILTRATION; INTRACAUDAL; PERINEURAL AS NEEDED
Status: DISCONTINUED | OUTPATIENT
Start: 2021-10-26 | End: 2021-10-26 | Stop reason: SURG

## 2021-10-26 RX ORDER — FLUMAZENIL 0.1 MG/ML
0.2 INJECTION INTRAVENOUS AS NEEDED
Status: DISCONTINUED | OUTPATIENT
Start: 2021-10-26 | End: 2021-10-26 | Stop reason: HOSPADM

## 2021-10-26 RX ORDER — HYDROMORPHONE HYDROCHLORIDE 1 MG/ML
0.5 INJECTION, SOLUTION INTRAMUSCULAR; INTRAVENOUS; SUBCUTANEOUS
Status: DISCONTINUED | OUTPATIENT
Start: 2021-10-26 | End: 2021-10-26 | Stop reason: HOSPADM

## 2021-10-26 RX ORDER — LIDOCAINE HYDROCHLORIDE 10 MG/ML
0.5 INJECTION, SOLUTION EPIDURAL; INFILTRATION; INTRACAUDAL; PERINEURAL ONCE AS NEEDED
Status: DISCONTINUED | OUTPATIENT
Start: 2021-10-26 | End: 2021-10-26 | Stop reason: HOSPADM

## 2021-10-26 RX ORDER — ONDANSETRON 2 MG/ML
INJECTION INTRAMUSCULAR; INTRAVENOUS AS NEEDED
Status: DISCONTINUED | OUTPATIENT
Start: 2021-10-26 | End: 2021-10-26 | Stop reason: SURG

## 2021-10-26 RX ORDER — SODIUM CHLORIDE 0.9 % (FLUSH) 0.9 %
10 SYRINGE (ML) INJECTION AS NEEDED
Status: DISCONTINUED | OUTPATIENT
Start: 2021-10-26 | End: 2021-10-26 | Stop reason: HOSPADM

## 2021-10-26 RX ORDER — PHENYLEPHRINE HCL IN 0.9% NACL 1 MG/10 ML
SYRINGE (ML) INTRAVENOUS AS NEEDED
Status: DISCONTINUED | OUTPATIENT
Start: 2021-10-26 | End: 2021-10-26 | Stop reason: SURG

## 2021-10-26 RX ORDER — MAGNESIUM HYDROXIDE 1200 MG/15ML
LIQUID ORAL AS NEEDED
Status: DISCONTINUED | OUTPATIENT
Start: 2021-10-26 | End: 2021-10-26 | Stop reason: HOSPADM

## 2021-10-26 RX ORDER — FENTANYL CITRATE 50 UG/ML
25 INJECTION, SOLUTION INTRAMUSCULAR; INTRAVENOUS
Status: DISCONTINUED | OUTPATIENT
Start: 2021-10-26 | End: 2021-10-26 | Stop reason: HOSPADM

## 2021-10-26 RX ORDER — SODIUM CHLORIDE, SODIUM LACTATE, POTASSIUM CHLORIDE, CALCIUM CHLORIDE 600; 310; 30; 20 MG/100ML; MG/100ML; MG/100ML; MG/100ML
9 INJECTION, SOLUTION INTRAVENOUS CONTINUOUS
Status: DISCONTINUED | OUTPATIENT
Start: 2021-10-26 | End: 2021-10-28 | Stop reason: HOSPADM

## 2021-10-26 RX ORDER — HYDROMORPHONE HYDROCHLORIDE 1 MG/ML
0.5 INJECTION, SOLUTION INTRAMUSCULAR; INTRAVENOUS; SUBCUTANEOUS ONCE
Status: COMPLETED | OUTPATIENT
Start: 2021-10-26 | End: 2021-10-26

## 2021-10-26 RX ORDER — DEXTROSE MONOHYDRATE 25 G/50ML
12.5 INJECTION, SOLUTION INTRAVENOUS AS NEEDED
Status: DISCONTINUED | OUTPATIENT
Start: 2021-10-26 | End: 2021-10-26 | Stop reason: HOSPADM

## 2021-10-26 RX ORDER — ONDANSETRON 2 MG/ML
4 INJECTION INTRAMUSCULAR; INTRAVENOUS AS NEEDED
Status: DISCONTINUED | OUTPATIENT
Start: 2021-10-26 | End: 2021-10-26 | Stop reason: HOSPADM

## 2021-10-26 RX ORDER — BUPIVACAINE HCL/0.9 % NACL/PF 0.1 %
2 PLASTIC BAG, INJECTION (ML) EPIDURAL ONCE
Status: DISCONTINUED | OUTPATIENT
Start: 2021-10-26 | End: 2021-10-26 | Stop reason: HOSPADM

## 2021-10-26 RX ORDER — LIDOCAINE HYDROCHLORIDE 40 MG/ML
SOLUTION TOPICAL AS NEEDED
Status: DISCONTINUED | OUTPATIENT
Start: 2021-10-26 | End: 2021-10-26 | Stop reason: SURG

## 2021-10-26 RX ORDER — NALOXONE HCL 0.4 MG/ML
0.04 VIAL (ML) INJECTION AS NEEDED
Status: DISCONTINUED | OUTPATIENT
Start: 2021-10-26 | End: 2021-10-26 | Stop reason: HOSPADM

## 2021-10-26 RX ORDER — BUPIVACAINE HYDROCHLORIDE AND EPINEPHRINE 2.5; 5 MG/ML; UG/ML
INJECTION, SOLUTION EPIDURAL; INFILTRATION; INTRACAUDAL; PERINEURAL AS NEEDED
Status: DISCONTINUED | OUTPATIENT
Start: 2021-10-26 | End: 2021-10-26 | Stop reason: HOSPADM

## 2021-10-26 RX ORDER — ROCURONIUM BROMIDE 10 MG/ML
INJECTION, SOLUTION INTRAVENOUS AS NEEDED
Status: DISCONTINUED | OUTPATIENT
Start: 2021-10-26 | End: 2021-10-26 | Stop reason: SURG

## 2021-10-26 RX ADMIN — FENTANYL CITRATE 25 MCG: 50 INJECTION INTRAMUSCULAR; INTRAVENOUS at 17:57

## 2021-10-26 RX ADMIN — CARVEDILOL 25 MG: 25 TABLET, FILM COATED ORAL at 20:43

## 2021-10-26 RX ADMIN — CEFAZOLIN SODIUM 2 G: 10 INJECTION, POWDER, FOR SOLUTION INTRAVENOUS at 01:25

## 2021-10-26 RX ADMIN — ROCURONIUM BROMIDE 20 MG: 50 INJECTION INTRAVENOUS at 16:24

## 2021-10-26 RX ADMIN — MAGNESIUM GLUCONATE 500 MG ORAL TABLET 400 MG: 500 TABLET ORAL at 09:12

## 2021-10-26 RX ADMIN — LIDOCAINE HYDROCHLORIDE 100 MG: 20 INJECTION, SOLUTION EPIDURAL; INFILTRATION; INTRACAUDAL; PERINEURAL at 16:24

## 2021-10-26 RX ADMIN — FENTANYL CITRATE 25 MCG: 50 INJECTION INTRAMUSCULAR; INTRAVENOUS at 17:52

## 2021-10-26 RX ADMIN — TRAMADOL HYDROCHLORIDE 50 MG: 50 TABLET, FILM COATED ORAL at 09:11

## 2021-10-26 RX ADMIN — GLYCOPYRROLATE 0.2 MG: 0.2 INJECTION, SOLUTION INTRAMUSCULAR; INTRAVENOUS at 16:58

## 2021-10-26 RX ADMIN — LABETALOL HYDROCHLORIDE 5 MG: 5 INJECTION, SOLUTION INTRAVENOUS at 17:26

## 2021-10-26 RX ADMIN — FOLIC ACID 1 MG: 1 TABLET ORAL at 12:35

## 2021-10-26 RX ADMIN — ISOSORBIDE MONONITRATE 60 MG: 60 TABLET, EXTENDED RELEASE ORAL at 09:12

## 2021-10-26 RX ADMIN — LIDOCAINE HYDROCHLORIDE 1 EACH: 40 SOLUTION TOPICAL at 16:24

## 2021-10-26 RX ADMIN — HYDROCODONE BITARTRATE AND ACETAMINOPHEN 1 TABLET: 7.5; 325 TABLET ORAL at 10:58

## 2021-10-26 RX ADMIN — LABETALOL HYDROCHLORIDE 5 MG: 5 INJECTION, SOLUTION INTRAVENOUS at 17:31

## 2021-10-26 RX ADMIN — PROPOFOL 70 MG: 10 INJECTION, EMULSION INTRAVENOUS at 16:24

## 2021-10-26 RX ADMIN — ASPIRIN 81 MG: 81 TABLET, COATED ORAL at 09:11

## 2021-10-26 RX ADMIN — DOCUSATE SODIUM 100 MG: 100 CAPSULE ORAL at 20:43

## 2021-10-26 RX ADMIN — VASOPRESSIN 1 ML: 20 INJECTION INTRAVENOUS at 16:36

## 2021-10-26 RX ADMIN — Medication 2 MG: at 16:58

## 2021-10-26 RX ADMIN — PANTOPRAZOLE SODIUM 40 MG: 40 TABLET, DELAYED RELEASE ORAL at 05:54

## 2021-10-26 RX ADMIN — FERROUS SULFATE TAB 325 MG (65 MG ELEMENTAL FE) 325 MG: 325 (65 FE) TAB at 09:12

## 2021-10-26 RX ADMIN — OXYCODONE AND ACETAMINOPHEN 1 TABLET: 325; 10 TABLET ORAL at 17:47

## 2021-10-26 RX ADMIN — LABETALOL HYDROCHLORIDE 5 MG: 5 INJECTION, SOLUTION INTRAVENOUS at 18:05

## 2021-10-26 RX ADMIN — CEFAZOLIN SODIUM 2 G: 10 INJECTION, POWDER, FOR SOLUTION INTRAVENOUS at 09:10

## 2021-10-26 RX ADMIN — THIAMINE HCL TAB 100 MG 250 MG: 100 TAB at 09:11

## 2021-10-26 RX ADMIN — LEFLUNOMIDE 20 MG: 20 TABLET ORAL at 09:11

## 2021-10-26 RX ADMIN — CARVEDILOL 25 MG: 25 TABLET, FILM COATED ORAL at 09:12

## 2021-10-26 RX ADMIN — ONDANSETRON 4 MG: 2 INJECTION INTRAMUSCULAR; INTRAVENOUS at 16:47

## 2021-10-26 RX ADMIN — Medication 250 MG: at 09:12

## 2021-10-26 RX ADMIN — Medication 1 TABLET: at 12:35

## 2021-10-26 RX ADMIN — Medication 200 MCG: at 16:49

## 2021-10-26 RX ADMIN — TRAMADOL HYDROCHLORIDE 50 MG: 50 TABLET, FILM COATED ORAL at 15:10

## 2021-10-26 RX ADMIN — TRAMADOL HYDROCHLORIDE 50 MG: 50 TABLET, FILM COATED ORAL at 20:44

## 2021-10-26 RX ADMIN — LABETALOL HYDROCHLORIDE 5 MG: 5 INJECTION, SOLUTION INTRAVENOUS at 17:48

## 2021-10-26 RX ADMIN — LEVOTHYROXINE SODIUM 75 MCG: 75 TABLET ORAL at 05:54

## 2021-10-26 RX ADMIN — ONDANSETRON 4 MG: 2 INJECTION INTRAMUSCULAR; INTRAVENOUS at 17:48

## 2021-10-26 RX ADMIN — HYDRALAZINE HYDROCHLORIDE 20 MG: 20 INJECTION INTRAMUSCULAR; INTRAVENOUS at 02:25

## 2021-10-26 RX ADMIN — CEFAZOLIN SODIUM 2 G: 10 INJECTION, POWDER, FOR SOLUTION INTRAVENOUS at 19:28

## 2021-10-26 RX ADMIN — POTASSIUM CHLORIDE 20 MEQ: 1.5 POWDER, FOR SOLUTION ORAL at 09:11

## 2021-10-26 RX ADMIN — VALACYCLOVIR HYDROCHLORIDE 500 MG: 500 TABLET, FILM COATED ORAL at 09:12

## 2021-10-26 RX ADMIN — Medication 200 MCG: at 16:56

## 2021-10-26 RX ADMIN — FENTANYL CITRATE 100 MCG: 50 INJECTION, SOLUTION INTRAMUSCULAR; INTRAVENOUS at 16:18

## 2021-10-26 RX ADMIN — DEXAMETHASONE SODIUM PHOSPHATE 8 MG: 4 INJECTION, SOLUTION INTRA-ARTICULAR; INTRALESIONAL; INTRAMUSCULAR; INTRAVENOUS; SOFT TISSUE at 16:31

## 2021-10-26 RX ADMIN — Medication 300 MCG: at 16:53

## 2021-10-26 RX ADMIN — HYDROCODONE BITARTRATE AND ACETAMINOPHEN 1 TABLET: 7.5; 325 TABLET ORAL at 05:54

## 2021-10-26 RX ADMIN — Medication 200 MCG: at 16:46

## 2021-10-26 RX ADMIN — SODIUM CHLORIDE, POTASSIUM CHLORIDE, SODIUM LACTATE AND CALCIUM CHLORIDE 9 ML/HR: 600; 310; 30; 20 INJECTION, SOLUTION INTRAVENOUS at 16:11

## 2021-10-26 RX ADMIN — HYDROCODONE BITARTRATE AND ACETAMINOPHEN 1 TABLET: 7.5; 325 TABLET ORAL at 00:02

## 2021-10-26 RX ADMIN — VASOPRESSIN 1 ML: 20 INJECTION INTRAVENOUS at 16:27

## 2021-10-26 RX ADMIN — Medication 100 MCG: at 16:43

## 2021-10-26 RX ADMIN — SUCRALFATE 1 G: 1 TABLET ORAL at 12:35

## 2021-10-26 RX ADMIN — POTASSIUM CHLORIDE 20 MEQ: 1.5 POWDER, FOR SOLUTION ORAL at 20:43

## 2021-10-26 RX ADMIN — Medication 200 MCG: at 16:51

## 2021-10-26 RX ADMIN — CEFAZOLIN SODIUM 2 G: 10 INJECTION, POWDER, FOR SOLUTION INTRAVENOUS at 16:31

## 2021-10-26 RX ADMIN — HYDROMORPHONE HYDROCHLORIDE 0.5 MG: 1 INJECTION, SOLUTION INTRAMUSCULAR; INTRAVENOUS; SUBCUTANEOUS at 01:46

## 2021-10-26 RX ADMIN — FUROSEMIDE 40 MG: 40 TABLET ORAL at 05:54

## 2021-10-26 RX ADMIN — DOCUSATE SODIUM 100 MG: 100 CAPSULE ORAL at 09:11

## 2021-10-26 RX ADMIN — Medication 10 ML: at 09:12

## 2021-10-26 RX ADMIN — SUCRALFATE 1 G: 1 TABLET ORAL at 09:12

## 2021-10-26 RX ADMIN — SUCRALFATE 1 G: 1 TABLET ORAL at 20:43

## 2021-10-26 RX ADMIN — FAMOTIDINE 40 MG: 20 TABLET, FILM COATED ORAL at 09:12

## 2021-10-26 RX ADMIN — CLONIDINE HYDROCHLORIDE 0.1 MG: 0.1 TABLET ORAL at 00:27

## 2021-10-26 NOTE — ANESTHESIA PROCEDURE NOTES
Airway  Urgency: elective    Date/Time: 10/26/2021 4:24 PM  Airway not difficult    General Information and Staff    Patient location during procedure: OR  CRNA: Bandar Watt CRNA    Indications and Patient Condition  Indications for airway management: airway protection    Preoxygenated: yes  Mask difficulty assessment: 1 - vent by mask    Final Airway Details  Final airway type: endotracheal airway      Successful airway: ETT  Cuffed: yes   Successful intubation technique: direct laryngoscopy  Facilitating devices/methods: intubating stylet  Endotracheal tube insertion site: oral  Blade: Flores  Blade size: 2  ETT size (mm): 7.0  Cormack-Lehane Classification: grade IIb - view of arytenoids or posterior of glottis only  Placement verified by: chest auscultation and capnometry   Cuff volume (mL): 8  Measured from: lips  ETT/EBT  to lips (cm): 21  Number of attempts at approach: 1  Assessment: lips, teeth, and gum same as pre-op and atraumatic intubation    Additional Comments  Slight cut to upper lip

## 2021-10-26 NOTE — ANESTHESIA PREPROCEDURE EVALUATION
Anesthesia Evaluation     Patient summary reviewed   history of anesthetic complications: difficult airway  NPO Solid Status: > 8 hours  NPO Liquid Status: > 8 hours           Airway   Mallampati: I  TM distance: >3 FB  Neck ROM: full  No difficulty expected  Dental - normal exam     Pulmonary    (+) asthma,  Cardiovascular   Exercise tolerance: poor (<4 METS)    (+) pacemaker (Medtronic ) pacemaker interrogated 1-3 months ago, hypertension well controlled 2 medications or greater, past MI  >12 months, CAD, cardiac stents (2 stents, most recent 6 years ago) more than 12 months ago dysrhythmias (SSS), DVT (Acute--There is evidence of deep venous thrombosis and superficial 6/23) resolved, hyperlipidemia,       Neuro/Psych  (+) headaches, syncope, numbness,     GI/Hepatic/Renal/Endo    (+)   thyroid problem hypothyroidism  (-) liver disease, no renal disease, diabetes    Musculoskeletal     (+) gait problem,   Abdominal    Substance History - negative use     OB/GYN          Other   arthritis (rheumatoid arthritis), blood dyscrasia anemia,       Other Comment: Sjogren's                     Anesthesia Plan    ASA 3     general     intravenous induction     Anesthetic plan, all risks, benefits, and alternatives have been provided, discussed and informed consent has been obtained with: patient.

## 2021-10-27 PROCEDURE — 25010000002 CEFAZOLIN PER 500 MG: Performed by: NURSE PRACTITIONER

## 2021-10-27 PROCEDURE — C1751 CATH, INF, PER/CENT/MIDLINE: HCPCS

## 2021-10-27 PROCEDURE — 99024 POSTOP FOLLOW-UP VISIT: CPT | Performed by: NURSE PRACTITIONER

## 2021-10-27 PROCEDURE — 97530 THERAPEUTIC ACTIVITIES: CPT | Performed by: PHYSICAL THERAPIST

## 2021-10-27 PROCEDURE — 97535 SELF CARE MNGMENT TRAINING: CPT

## 2021-10-27 PROCEDURE — 36410 VNPNXR 3YR/> PHY/QHP DX/THER: CPT

## 2021-10-27 PROCEDURE — 97168 OT RE-EVAL EST PLAN CARE: CPT

## 2021-10-27 RX ORDER — MAGNESIUM CARB/ALUMINUM HYDROX 105-160MG
150 TABLET,CHEWABLE ORAL ONCE
Status: COMPLETED | OUTPATIENT
Start: 2021-10-27 | End: 2021-10-27

## 2021-10-27 RX ORDER — LIDOCAINE HYDROCHLORIDE 10 MG/ML
1 INJECTION, SOLUTION EPIDURAL; INFILTRATION; INTRACAUDAL; PERINEURAL ONCE
Status: COMPLETED | OUTPATIENT
Start: 2021-10-27 | End: 2021-10-27

## 2021-10-27 RX ORDER — POLYETHYLENE GLYCOL 3350 17 G/17G
17 POWDER, FOR SOLUTION ORAL DAILY
Status: DISCONTINUED | OUTPATIENT
Start: 2021-10-28 | End: 2021-10-28 | Stop reason: HOSPADM

## 2021-10-27 RX ADMIN — CEFAZOLIN SODIUM 2 G: 10 INJECTION, POWDER, FOR SOLUTION INTRAVENOUS at 01:20

## 2021-10-27 RX ADMIN — FERROUS SULFATE TAB 325 MG (65 MG ELEMENTAL FE) 325 MG: 325 (65 FE) TAB at 08:21

## 2021-10-27 RX ADMIN — PANTOPRAZOLE SODIUM 40 MG: 40 TABLET, DELAYED RELEASE ORAL at 05:58

## 2021-10-27 RX ADMIN — SUCRALFATE 1 G: 1 TABLET ORAL at 12:28

## 2021-10-27 RX ADMIN — CARVEDILOL 25 MG: 25 TABLET, FILM COATED ORAL at 08:21

## 2021-10-27 RX ADMIN — Medication 10 ML: at 08:22

## 2021-10-27 RX ADMIN — MAGNESIUM GLUCONATE 500 MG ORAL TABLET 400 MG: 500 TABLET ORAL at 08:21

## 2021-10-27 RX ADMIN — HYDROCODONE BITARTRATE AND ACETAMINOPHEN 1 TABLET: 7.5; 325 TABLET ORAL at 12:30

## 2021-10-27 RX ADMIN — SUCRALFATE 1 G: 1 TABLET ORAL at 08:21

## 2021-10-27 RX ADMIN — TRAMADOL HYDROCHLORIDE 50 MG: 50 TABLET, FILM COATED ORAL at 16:33

## 2021-10-27 RX ADMIN — Medication 10 ML: at 20:14

## 2021-10-27 RX ADMIN — FOLIC ACID 1 MG: 1 TABLET ORAL at 12:28

## 2021-10-27 RX ADMIN — DOCUSATE SODIUM 100 MG: 100 CAPSULE ORAL at 20:13

## 2021-10-27 RX ADMIN — Medication 1 TABLET: at 12:28

## 2021-10-27 RX ADMIN — SUCRALFATE 1 G: 1 TABLET ORAL at 18:04

## 2021-10-27 RX ADMIN — LEVOTHYROXINE SODIUM 75 MCG: 75 TABLET ORAL at 05:58

## 2021-10-27 RX ADMIN — FAMOTIDINE 40 MG: 20 TABLET, FILM COATED ORAL at 08:21

## 2021-10-27 RX ADMIN — LIDOCAINE HYDROCHLORIDE ANHYDROUS 1 ML: 10 INJECTION, SOLUTION INFILTRATION at 16:19

## 2021-10-27 RX ADMIN — HYDROCODONE BITARTRATE AND ACETAMINOPHEN 1 TABLET: 7.5; 325 TABLET ORAL at 08:19

## 2021-10-27 RX ADMIN — ISOSORBIDE MONONITRATE 60 MG: 60 TABLET, EXTENDED RELEASE ORAL at 08:21

## 2021-10-27 RX ADMIN — POTASSIUM CHLORIDE 20 MEQ: 1.5 POWDER, FOR SOLUTION ORAL at 08:21

## 2021-10-27 RX ADMIN — Medication 250 MG: at 08:20

## 2021-10-27 RX ADMIN — SUCRALFATE 1 G: 1 TABLET ORAL at 20:13

## 2021-10-27 RX ADMIN — TRAMADOL HYDROCHLORIDE 50 MG: 50 TABLET, FILM COATED ORAL at 20:13

## 2021-10-27 RX ADMIN — ISOSORBIDE MONONITRATE 60 MG: 60 TABLET, EXTENDED RELEASE ORAL at 18:04

## 2021-10-27 RX ADMIN — CEFAZOLIN SODIUM 2 G: 10 INJECTION, POWDER, FOR SOLUTION INTRAVENOUS at 18:04

## 2021-10-27 RX ADMIN — POTASSIUM CHLORIDE 20 MEQ: 1.5 POWDER, FOR SOLUTION ORAL at 20:13

## 2021-10-27 RX ADMIN — ASPIRIN 81 MG: 81 TABLET, COATED ORAL at 08:21

## 2021-10-27 RX ADMIN — THIAMINE HCL TAB 100 MG 250 MG: 100 TAB at 08:20

## 2021-10-27 RX ADMIN — FUROSEMIDE 40 MG: 40 TABLET ORAL at 05:58

## 2021-10-27 RX ADMIN — CEFAZOLIN SODIUM 2 G: 10 INJECTION, POWDER, FOR SOLUTION INTRAVENOUS at 10:26

## 2021-10-27 RX ADMIN — VALACYCLOVIR HYDROCHLORIDE 500 MG: 500 TABLET, FILM COATED ORAL at 08:20

## 2021-10-27 RX ADMIN — HYDROCODONE BITARTRATE AND ACETAMINOPHEN 1 TABLET: 7.5; 325 TABLET ORAL at 16:33

## 2021-10-27 RX ADMIN — CARVEDILOL 25 MG: 25 TABLET, FILM COATED ORAL at 18:04

## 2021-10-27 RX ADMIN — DOCUSATE SODIUM 100 MG: 100 CAPSULE ORAL at 08:21

## 2021-10-27 RX ADMIN — TRAMADOL HYDROCHLORIDE 50 MG: 50 TABLET, FILM COATED ORAL at 08:19

## 2021-10-27 RX ADMIN — Medication 150 ML: at 23:39

## 2021-10-27 NOTE — ANESTHESIA POSTPROCEDURE EVALUATION
"Patient: Tawny Shin    Procedure Summary     Date: 10/26/21 Room / Location:  [facility]  [facility]    Anesthesia Start: 1617 Anesthesia Stop: 1710    Procedure: THOARCIC 12 KYPHOPLASTY WITH BIOPSY (Bilateral Spine Lumbar) Diagnosis:       T12 compression fracture, initial encounter ([facility])      (T12 compression fracture, initial encounter ([facility]) [S22.080A])    Surgeons: Bandar Shea MD Provider: Bandar Watt CRNA    Anesthesia Type: general ASA Status: 3          Anesthesia Type: general    Vitals  Vitals Value Taken Time   /72 10/26/21 1819   Temp 98 °F (36.7 °C) 10/26/21 1819   Pulse 69 10/26/21 1839   Resp 16 10/26/21 1819   SpO2 93 % 10/26/21 1839   Vitals shown include unvalidated device data.        Post Anesthesia Care and Evaluation    PONV Status: none  Comments: Patient d/c from PACU prior to anes eval based on Abdifatah score.  Please see RN notes for details of d/c criteria.    Blood pressure 143/69, pulse 63, temperature 98.5 °F (36.9 °C), temperature source Oral, resp. rate 18, height 170.7 cm (67.2\"), weight 81 kg (178 lb 8 oz), SpO2 94 %, not currently breastfeeding.          "

## 2021-10-28 ENCOUNTER — HOSPITAL ENCOUNTER (INPATIENT)
Age: 66
LOS: 7 days | Discharge: ANOTHER HOSPITAL | DRG: 871 | End: 2021-11-04
Attending: PSYCHIATRY & NEUROLOGY | Admitting: PSYCHIATRY & NEUROLOGY
Payer: MEDICARE

## 2021-10-28 VITALS
HEIGHT: 67 IN | WEIGHT: 178.5 LBS | SYSTOLIC BLOOD PRESSURE: 163 MMHG | TEMPERATURE: 97.5 F | BODY MASS INDEX: 28.02 KG/M2 | DIASTOLIC BLOOD PRESSURE: 60 MMHG | RESPIRATION RATE: 16 BRPM | HEART RATE: 60 BPM | OXYGEN SATURATION: 94 %

## 2021-10-28 PROBLEM — M54.6 ACUTE MIDLINE THORACIC BACK PAIN: Status: ACTIVE | Noted: 2021-10-28

## 2021-10-28 PROBLEM — N30.00 ACUTE CYSTITIS WITHOUT HEMATURIA: Status: ACTIVE | Noted: 2021-10-28

## 2021-10-28 PROBLEM — Z98.890 S/P KYPHOPLASTY: Status: ACTIVE | Noted: 2021-10-28

## 2021-10-28 PROBLEM — R78.81 MSSA BACTEREMIA: Status: ACTIVE | Noted: 2021-10-28

## 2021-10-28 PROBLEM — B95.61 MSSA BACTEREMIA: Status: ACTIVE | Noted: 2021-10-28

## 2021-10-28 PROBLEM — S22.020D CLOSED WEDGE COMPRESSION FRACTURE OF T2 VERTEBRA WITH ROUTINE HEALING: Status: ACTIVE | Noted: 2021-10-28

## 2021-10-28 PROBLEM — D64.9 ANEMIA: Status: ACTIVE | Noted: 2021-10-28

## 2021-10-28 LAB
ANION GAP SERPL CALCULATED.3IONS-SCNC: 5 MMOL/L (ref 5–15)
BACTERIA SPEC AEROBE CULT: NORMAL
BUN SERPL-MCNC: 18 MG/DL (ref 8–23)
BUN/CREAT SERPL: 40 (ref 7–25)
CALCIUM SPEC-SCNC: 8.1 MG/DL (ref 8.6–10.5)
CHLORIDE SERPL-SCNC: 95 MMOL/L (ref 98–107)
CO2 SERPL-SCNC: 32 MMOL/L (ref 22–29)
CREAT SERPL-MCNC: 0.45 MG/DL (ref 0.57–1)
CYTO UR: NORMAL
GFR SERPL CREATININE-BSD FRML MDRD: 139 ML/MIN/1.73
GLUCOSE SERPL-MCNC: 118 MG/DL (ref 65–99)
INR BLD: 1.5 (ref 0.88–1.18)
LAB AP CASE REPORT: NORMAL
PATH REPORT.FINAL DX SPEC: NORMAL
PATH REPORT.GROSS SPEC: NORMAL
POTASSIUM SERPL-SCNC: 3.8 MMOL/L (ref 3.5–5.2)
PREALBUMIN: 12 MG/DL (ref 20–40)
PROTHROMBIN TIME: 18.2 SEC (ref 12–14.6)
SARS-COV-2 RNA PNL SPEC NAA+PROBE: NOT DETECTED
SODIUM SERPL-SCNC: 132 MMOL/L (ref 136–145)
T4 FREE: 1.31 NG/DL (ref 0.93–1.7)
TSH REFLEX FT4: 7.59 UIU/ML (ref 0.35–5.5)
VITAMIN B-12: 786 PG/ML (ref 211–946)

## 2021-10-28 PROCEDURE — 84443 ASSAY THYROID STIM HORMONE: CPT

## 2021-10-28 PROCEDURE — 6360000002 HC RX W HCPCS: Performed by: PSYCHIATRY & NEUROLOGY

## 2021-10-28 PROCEDURE — 97535 SELF CARE MNGMENT TRAINING: CPT | Performed by: OCCUPATIONAL THERAPIST

## 2021-10-28 PROCEDURE — 85610 PROTHROMBIN TIME: CPT

## 2021-10-28 PROCEDURE — 84439 ASSAY OF FREE THYROXINE: CPT

## 2021-10-28 PROCEDURE — 6370000000 HC RX 637 (ALT 250 FOR IP): Performed by: PSYCHIATRY & NEUROLOGY

## 2021-10-28 PROCEDURE — 82607 VITAMIN B-12: CPT

## 2021-10-28 PROCEDURE — 25010000002 CEFAZOLIN PER 500 MG: Performed by: NURSE PRACTITIONER

## 2021-10-28 PROCEDURE — 36415 COLL VENOUS BLD VENIPUNCTURE: CPT

## 2021-10-28 PROCEDURE — 1180000000 HC REHAB R&B

## 2021-10-28 PROCEDURE — 84134 ASSAY OF PREALBUMIN: CPT

## 2021-10-28 PROCEDURE — 2580000003 HC RX 258: Performed by: PSYCHIATRY & NEUROLOGY

## 2021-10-28 PROCEDURE — 99024 POSTOP FOLLOW-UP VISIT: CPT | Performed by: NURSE PRACTITIONER

## 2021-10-28 PROCEDURE — 80048 BASIC METABOLIC PNL TOTAL CA: CPT | Performed by: FAMILY MEDICINE

## 2021-10-28 PROCEDURE — 87635 SARS-COV-2 COVID-19 AMP PRB: CPT | Performed by: FAMILY MEDICINE

## 2021-10-28 RX ORDER — HYDROCODONE BITARTRATE AND ACETAMINOPHEN 7.5; 325 MG/1; MG/1
1 TABLET ORAL EVERY 4 HOURS PRN
Status: ON HOLD | COMMUNITY
End: 2021-11-03 | Stop reason: HOSPADM

## 2021-10-28 RX ORDER — THIAMINE MONONITRATE (VIT B1) 100 MG
250 TABLET ORAL DAILY
COMMUNITY

## 2021-10-28 RX ORDER — SALSALATE 500 MG
500 TABLET ORAL DAILY
Status: DISCONTINUED | OUTPATIENT
Start: 2021-10-28 | End: 2021-11-04 | Stop reason: HOSPADM

## 2021-10-28 RX ORDER — LANOLIN ALCOHOL/MO/W.PET/CERES
400 CREAM (GRAM) TOPICAL DAILY
Status: DISCONTINUED | OUTPATIENT
Start: 2021-10-28 | End: 2021-11-04 | Stop reason: HOSPADM

## 2021-10-28 RX ORDER — TRAMADOL HYDROCHLORIDE 50 MG/1
50 TABLET ORAL 3 TIMES DAILY
Status: DISCONTINUED | OUTPATIENT
Start: 2021-10-28 | End: 2021-10-28

## 2021-10-28 RX ORDER — POLYETHYLENE GLYCOL 3350 17 G/17G
17 POWDER, FOR SOLUTION ORAL DAILY
Start: 2021-10-29 | End: 2022-03-28

## 2021-10-28 RX ORDER — SUCRALFATE 1 G/1
1 TABLET ORAL
COMMUNITY

## 2021-10-28 RX ORDER — TRAMADOL HYDROCHLORIDE 50 MG/1
50 TABLET ORAL 3 TIMES DAILY
Status: ON HOLD | COMMUNITY
End: 2021-11-03 | Stop reason: HOSPADM

## 2021-10-28 RX ORDER — ACETAMINOPHEN 325 MG/1
650 TABLET ORAL EVERY 4 HOURS PRN
Status: ON HOLD | COMMUNITY
End: 2021-11-03 | Stop reason: HOSPADM

## 2021-10-28 RX ORDER — FLUTICASONE PROPIONATE 50 MCG
1 SPRAY, SUSPENSION (ML) NASAL DAILY PRN
Status: DISCONTINUED | OUTPATIENT
Start: 2021-10-28 | End: 2021-11-04 | Stop reason: HOSPADM

## 2021-10-28 RX ORDER — LIDOCAINE 4 G/G
1 PATCH TOPICAL DAILY
Status: DISCONTINUED | OUTPATIENT
Start: 2021-10-28 | End: 2021-11-04 | Stop reason: HOSPADM

## 2021-10-28 RX ORDER — FUROSEMIDE 40 MG/1
40 TABLET ORAL DAILY
Status: DISCONTINUED | OUTPATIENT
Start: 2021-10-28 | End: 2021-11-04 | Stop reason: HOSPADM

## 2021-10-28 RX ORDER — DOCUSATE SODIUM 100 MG/1
100 CAPSULE, LIQUID FILLED ORAL DAILY
Status: DISCONTINUED | OUTPATIENT
Start: 2021-10-28 | End: 2021-11-04 | Stop reason: HOSPADM

## 2021-10-28 RX ORDER — CEFAZOLIN SODIUM 2 G/100ML
2000 INJECTION, SOLUTION INTRAVENOUS EVERY 8 HOURS
Status: DISCONTINUED | OUTPATIENT
Start: 2021-10-28 | End: 2021-11-04 | Stop reason: HOSPADM

## 2021-10-28 RX ORDER — TRAMADOL HYDROCHLORIDE 50 MG/1
50 TABLET ORAL 3 TIMES DAILY
Status: DISCONTINUED | OUTPATIENT
Start: 2021-10-28 | End: 2021-11-02

## 2021-10-28 RX ORDER — HYDROCODONE BITARTRATE AND ACETAMINOPHEN 7.5; 325 MG/1; MG/1
1 TABLET ORAL EVERY 4 HOURS PRN
Start: 2021-10-28 | End: 2021-11-12 | Stop reason: HOSPADM

## 2021-10-28 RX ORDER — FOLIC ACID 1 MG/1
1 TABLET ORAL DAILY
COMMUNITY

## 2021-10-28 RX ORDER — SALSALATE 500 MG
500 TABLET ORAL DAILY
COMMUNITY

## 2021-10-28 RX ORDER — CARVEDILOL 25 MG/1
25 TABLET ORAL 2 TIMES DAILY WITH MEALS
Status: DISCONTINUED | OUTPATIENT
Start: 2021-10-28 | End: 2021-11-04 | Stop reason: HOSPADM

## 2021-10-28 RX ORDER — LEVOTHYROXINE SODIUM 0.07 MG/1
75 TABLET ORAL DAILY
Status: DISCONTINUED | OUTPATIENT
Start: 2021-10-29 | End: 2021-11-04 | Stop reason: HOSPADM

## 2021-10-28 RX ORDER — SACCHAROMYCES BOULARDII 250 MG
250 CAPSULE ORAL DAILY
Status: DISCONTINUED | OUTPATIENT
Start: 2021-10-28 | End: 2021-10-28

## 2021-10-28 RX ORDER — NITROGLYCERIN 0.4 MG/1
0.4 TABLET SUBLINGUAL EVERY 5 MIN PRN
Status: DISCONTINUED | OUTPATIENT
Start: 2021-10-28 | End: 2021-11-04 | Stop reason: HOSPADM

## 2021-10-28 RX ORDER — SUCRALFATE 1 G/1
1 TABLET ORAL
Status: DISCONTINUED | OUTPATIENT
Start: 2021-10-28 | End: 2021-11-04 | Stop reason: HOSPADM

## 2021-10-28 RX ORDER — FOLIC ACID 1 MG/1
1 TABLET ORAL DAILY
Status: DISCONTINUED | OUTPATIENT
Start: 2021-10-28 | End: 2021-11-04 | Stop reason: HOSPADM

## 2021-10-28 RX ORDER — BUPIVACAINE HCL/0.9 % NACL/PF 0.1 %
2 PLASTIC BAG, INJECTION (ML) EPIDURAL EVERY 8 HOURS
Qty: 2300 ML | Refills: 0 | Status: ON HOLD
Start: 2021-10-23 | End: 2021-11-12

## 2021-10-28 RX ORDER — PANTOPRAZOLE SODIUM 40 MG/1
40 TABLET, DELAYED RELEASE ORAL DAILY
Status: DISCONTINUED | OUTPATIENT
Start: 2021-10-28 | End: 2021-11-04 | Stop reason: HOSPADM

## 2021-10-28 RX ORDER — LACTOBACILLUS RHAMNOSUS GG 10B CELL
1 CAPSULE ORAL DAILY
Status: DISCONTINUED | OUTPATIENT
Start: 2021-10-28 | End: 2021-11-04 | Stop reason: HOSPADM

## 2021-10-28 RX ORDER — FERROUS SULFATE 325(65) MG
325 TABLET ORAL
Status: DISCONTINUED | OUTPATIENT
Start: 2021-10-29 | End: 2021-11-04 | Stop reason: HOSPADM

## 2021-10-28 RX ORDER — LIDOCAINE 50 MG/G
1 PATCH TOPICAL DAILY PRN
COMMUNITY

## 2021-10-28 RX ORDER — MAGNESIUM OXIDE 400 MG/1
400 TABLET ORAL DAILY
COMMUNITY

## 2021-10-28 RX ORDER — POTASSIUM CHLORIDE 20 MEQ/1
20 TABLET, EXTENDED RELEASE ORAL 2 TIMES DAILY
Status: DISCONTINUED | OUTPATIENT
Start: 2021-10-28 | End: 2021-11-04 | Stop reason: HOSPADM

## 2021-10-28 RX ORDER — FLUTICASONE PROPIONATE 50 MCG
1 SPRAY, SUSPENSION (ML) NASAL DAILY PRN
COMMUNITY

## 2021-10-28 RX ORDER — SODIUM CHLORIDE 0.9 % (FLUSH) 0.9 %
5-40 SYRINGE (ML) INJECTION PRN
Status: DISCONTINUED | OUTPATIENT
Start: 2021-10-28 | End: 2021-11-04 | Stop reason: HOSPADM

## 2021-10-28 RX ORDER — SODIUM CHLORIDE 0.9 % (FLUSH) 0.9 %
5-40 SYRINGE (ML) INJECTION 2 TIMES DAILY
Status: DISCONTINUED | OUTPATIENT
Start: 2021-10-28 | End: 2021-11-04 | Stop reason: HOSPADM

## 2021-10-28 RX ORDER — SACCHAROMYCES BOULARDII 250 MG
250 CAPSULE ORAL DAILY
COMMUNITY

## 2021-10-28 RX ORDER — GAUZE BANDAGE 2" X 2"
250 BANDAGE TOPICAL DAILY
Status: DISCONTINUED | OUTPATIENT
Start: 2021-10-28 | End: 2021-11-04 | Stop reason: HOSPADM

## 2021-10-28 RX ORDER — FERROUS SULFATE 325(65) MG
325 TABLET ORAL
COMMUNITY

## 2021-10-28 RX ORDER — POLYETHYLENE GLYCOL 3350 17 G/17G
17 POWDER, FOR SOLUTION ORAL DAILY PRN
Status: DISCONTINUED | OUTPATIENT
Start: 2021-10-28 | End: 2021-11-04 | Stop reason: HOSPADM

## 2021-10-28 RX ORDER — M-VIT,TX,IRON,MINS/CALC/FOLIC 27MG-0.4MG
1 TABLET ORAL DAILY
Status: DISCONTINUED | OUTPATIENT
Start: 2021-10-28 | End: 2021-11-04 | Stop reason: HOSPADM

## 2021-10-28 RX ORDER — HYDROCODONE BITARTRATE AND ACETAMINOPHEN 7.5; 325 MG/1; MG/1
1 TABLET ORAL EVERY 4 HOURS PRN
Status: DISCONTINUED | OUTPATIENT
Start: 2021-10-28 | End: 2021-10-29

## 2021-10-28 RX ORDER — ASPIRIN 81 MG/1
81 TABLET ORAL DAILY
Status: ON HOLD | COMMUNITY
End: 2021-11-03 | Stop reason: HOSPADM

## 2021-10-28 RX ORDER — VALACYCLOVIR HYDROCHLORIDE 500 MG/1
500 TABLET, FILM COATED ORAL DAILY
Status: DISCONTINUED | OUTPATIENT
Start: 2021-10-28 | End: 2021-11-04 | Stop reason: HOSPADM

## 2021-10-28 RX ORDER — ACETAMINOPHEN 325 MG/1
650 TABLET ORAL EVERY 4 HOURS PRN
Status: DISCONTINUED | OUTPATIENT
Start: 2021-10-28 | End: 2021-11-04 | Stop reason: HOSPADM

## 2021-10-28 RX ORDER — ISOSORBIDE MONONITRATE 60 MG/1
60 TABLET, EXTENDED RELEASE ORAL 2 TIMES DAILY
Status: DISCONTINUED | OUTPATIENT
Start: 2021-10-28 | End: 2021-11-04 | Stop reason: HOSPADM

## 2021-10-28 RX ORDER — ASPIRIN 81 MG/1
81 TABLET ORAL DAILY
Status: DISCONTINUED | OUTPATIENT
Start: 2021-10-28 | End: 2021-11-04 | Stop reason: HOSPADM

## 2021-10-28 RX ADMIN — HYDROCODONE BITARTRATE AND ACETAMINOPHEN 1 TABLET: 7.5; 325 TABLET ORAL at 02:25

## 2021-10-28 RX ADMIN — FAMOTIDINE 40 MG: 20 TABLET, FILM COATED ORAL at 09:02

## 2021-10-28 RX ADMIN — TRAMADOL HYDROCHLORIDE 50 MG: 50 TABLET, COATED ORAL at 21:09

## 2021-10-28 RX ADMIN — CARVEDILOL 25 MG: 25 TABLET, FILM COATED ORAL at 21:09

## 2021-10-28 RX ADMIN — HYDROCODONE BITARTRATE AND ACETAMINOPHEN 1 TABLET: 7.5; 325 TABLET ORAL at 11:00

## 2021-10-28 RX ADMIN — LEVOTHYROXINE SODIUM 75 MCG: 75 TABLET ORAL at 05:18

## 2021-10-28 RX ADMIN — HYDROCODONE BITARTRATE AND ACETAMINOPHEN 1 TABLET: 7.5; 325 TABLET ORAL at 23:34

## 2021-10-28 RX ADMIN — HYDROCODONE BITARTRATE AND ACETAMINOPHEN 1 TABLET: 7.5; 325 TABLET ORAL at 14:32

## 2021-10-28 RX ADMIN — ISOSORBIDE MONONITRATE 60 MG: 60 TABLET, EXTENDED RELEASE ORAL at 09:02

## 2021-10-28 RX ADMIN — Medication 10 ML: at 10:59

## 2021-10-28 RX ADMIN — TRAMADOL HYDROCHLORIDE 50 MG: 50 TABLET, FILM COATED ORAL at 09:02

## 2021-10-28 RX ADMIN — FERROUS SULFATE TAB 325 MG (65 MG ELEMENTAL FE) 325 MG: 325 (65 FE) TAB at 09:02

## 2021-10-28 RX ADMIN — FUROSEMIDE 40 MG: 40 TABLET ORAL at 05:18

## 2021-10-28 RX ADMIN — HYDROCODONE BITARTRATE AND ACETAMINOPHEN 1 TABLET: 7.5; 325 TABLET ORAL at 19:26

## 2021-10-28 RX ADMIN — PANTOPRAZOLE SODIUM 40 MG: 40 TABLET, DELAYED RELEASE ORAL at 05:18

## 2021-10-28 RX ADMIN — CARVEDILOL 25 MG: 25 TABLET, FILM COATED ORAL at 09:02

## 2021-10-28 RX ADMIN — ASPIRIN 81 MG: 81 TABLET, COATED ORAL at 09:02

## 2021-10-28 RX ADMIN — HYDROCODONE BITARTRATE AND ACETAMINOPHEN 1 TABLET: 7.5; 325 TABLET ORAL at 06:19

## 2021-10-28 RX ADMIN — POTASSIUM CHLORIDE 20 MEQ: 1.5 POWDER, FOR SOLUTION ORAL at 09:02

## 2021-10-28 RX ADMIN — SUCRALFATE 1 G: 1 TABLET ORAL at 12:09

## 2021-10-28 RX ADMIN — Medication 1 TABLET: at 13:21

## 2021-10-28 RX ADMIN — POTASSIUM CHLORIDE 20 MEQ: 1500 TABLET, EXTENDED RELEASE ORAL at 21:09

## 2021-10-28 RX ADMIN — FOLIC ACID 1 MG: 1 TABLET ORAL at 13:21

## 2021-10-28 RX ADMIN — SUCRALFATE 1 G: 1 TABLET ORAL at 21:09

## 2021-10-28 RX ADMIN — MAGNESIUM GLUCONATE 500 MG ORAL TABLET 400 MG: 500 TABLET ORAL at 09:02

## 2021-10-28 RX ADMIN — VALACYCLOVIR HYDROCHLORIDE 500 MG: 500 TABLET, FILM COATED ORAL at 09:03

## 2021-10-28 RX ADMIN — ISOSORBIDE MONONITRATE 60 MG: 60 TABLET, EXTENDED RELEASE ORAL at 21:09

## 2021-10-28 RX ADMIN — CEFAZOLIN SODIUM 2 G: 10 INJECTION, POWDER, FOR SOLUTION INTRAVENOUS at 02:25

## 2021-10-28 RX ADMIN — ACETAMINOPHEN 650 MG: 325 TABLET, FILM COATED ORAL at 05:18

## 2021-10-28 RX ADMIN — SODIUM CHLORIDE, PRESERVATIVE FREE 10 ML: 5 INJECTION INTRAVENOUS at 22:17

## 2021-10-28 RX ADMIN — Medication 250 MG: at 09:03

## 2021-10-28 RX ADMIN — CEFAZOLIN SODIUM 2 G: 10 INJECTION, POWDER, FOR SOLUTION INTRAVENOUS at 11:00

## 2021-10-28 RX ADMIN — SUCRALFATE 1 G: 1 TABLET ORAL at 09:02

## 2021-10-28 RX ADMIN — THIAMINE HCL TAB 100 MG 250 MG: 100 TAB at 09:02

## 2021-10-28 RX ADMIN — CEFAZOLIN SODIUM 2000 MG: 2 INJECTION, SOLUTION INTRAVENOUS at 22:17

## 2021-10-28 ASSESSMENT — PAIN - FUNCTIONAL ASSESSMENT
PAIN_FUNCTIONAL_ASSESSMENT: ACTIVITIES ARE NOT PREVENTED

## 2021-10-28 ASSESSMENT — PAIN SCALES - GENERAL
PAINLEVEL_OUTOF10: 8
PAINLEVEL_OUTOF10: 7
PAINLEVEL_OUTOF10: 6
PAINLEVEL_OUTOF10: 5

## 2021-10-28 ASSESSMENT — PAIN DESCRIPTION - LOCATION
LOCATION: BACK

## 2021-10-28 ASSESSMENT — PAIN DESCRIPTION - PAIN TYPE
TYPE: ACUTE PAIN

## 2021-10-28 ASSESSMENT — PAIN DESCRIPTION - ORIENTATION
ORIENTATION: MID

## 2021-10-28 ASSESSMENT — PAIN DESCRIPTION - DESCRIPTORS
DESCRIPTORS: ACHING;DISCOMFORT

## 2021-10-28 ASSESSMENT — PAIN DESCRIPTION - ONSET
ONSET: ON-GOING

## 2021-10-28 ASSESSMENT — PAIN DESCRIPTION - PROGRESSION
CLINICAL_PROGRESSION: NOT CHANGED

## 2021-10-28 ASSESSMENT — PAIN DESCRIPTION - FREQUENCY
FREQUENCY: CONTINUOUS

## 2021-10-28 NOTE — PROGRESS NOTES
Surinder Maradiaga arrived to room # 820. Presented with: MSSA BACTERMIA, UTI, T12 COMPRESSION FRACTURE, KYPHOPLASTY   Mental Status: Patient is oriented, alert, coherent, logical and thought processes intact. There were no vitals filed for this visit. Patient safety contract and falls prevention contract reviewed with patient Yes. Oriented Patient and Family to room. Call light within reach. Yes.   Needs, issues or concerns expressed at this time: no.      Electronically signed by Harmeet White RN on 10/28/2021 at 4:28 PM

## 2021-10-28 NOTE — PLAN OF CARE
[address] TREATMENT PLAN      Ludin Dias    : 1955  Acct #: [de-identified]  MRN: [id number]   PHYSICIAN:  Sam Roger MD  Primary Problem    Patient Active Problem List   Diagnosis    Coronary artery disease of native artery of native heart with stable angina pectoris ([facility] [address]    Hypertension    Rheumatoid arteritis ([facility] [address]    History of pulmonary fibrosis    History of DVT (deep vein thrombosis)    Thyroid disease    Intrinsic asthma    Hypercholesterolemia    Syncope, cardiogenic    Near syncope    Status post placement of implantable loop recorder    Left carotid bruit    S/P coronary artery stent placement    Chest pain    Pacemaker    Sinus node dysfunction (HCC)    Lumbar stenosis with neurogenic claudication    Spondylolisthesis of lumbar region    Leg swelling    Primary osteoarthritis of right knee    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)    Myalgia due to statin    Abnormal stress test    Discitis    MSSA bacteremia    Acute cystitis without hematuria    Closed wedge compression fracture of T2 vertebra with routine healing    S/P kyphoplasty    Acute midline thoracic back pain    Anemia    Severe malnutrition (HCC)       Rehabilitation Diagnosis:     MSSA bacteremia [R78.81, B95.61]  UTI (urinary tract infection) [N39.0]  T12 compression fracture ([facility] [address] [S22.080A]       ADMIT DATE:10/28/2021   CARE PLAN     NURSING:  Ludin Dias while on this unit will:     [] Be continent of bowel and bladder      [x] Have an adequate number of bowel movements   [] Urinate with no urinary retention >300ml in bladder   [] Complete bladder protocol with asif removal   [] Maintain O2 SATs at ___%   [x] Have pain managed while on ARU        [] Be pain free by discharge    [x] Have no skin breakdown while on ARU   [] Have improved skin integrity via wound measurements   [x] Have no signs/symptoms of infection at the wound site  [x] Be free from injury during hospitalization   [x] Complete Patient/Caregiver Education & Training, Safety Education & Training    IRF-CAT  Roll Left and Right  Assistance Needed: Substantial/maximal assistance  CARE Score: 2  Discharge Goal: Partial/moderate assistance  Sit to Lying  Assistance Needed: Dependent  CARE Score: 1  Discharge Goal: Partial/moderate assistance  Lying to Sitting on Side of Bed  Assistance Needed: Dependent  CARE Score: 1  Discharge Goal: Partial/moderate assistance  Sit to Stand  Reason if not Attempted: Not attempted due to medical condition or safety concerns  CARE Score: 88  Discharge Goal: Partial/moderate assistance  Chair/Bed-to-Chair Transfer  Reason if not Attempted: Not attempted due to medical condition or safety concerns  CARE Score: 88  Discharge Goal: Partial/moderate assistance  Car Transfer  Reason if not Attempted: Not attempted due to medical condition or safety concerns  CARE Score: 88  Discharge Goal: Partial/moderate assistance  Walk 10 Feet  Reason if not Attempted: Not attempted due to medical condition or safety concerns  CARE Score: 88  Discharge Goal: Partial/moderate assistance  Walk 50 Feet with Two Turns  Reason if not Attempted: Not attempted due to medical condition or safety concerns  CARE Score: 88  Discharge Goal: Partial/moderate assistance  Walk 150 Feet  Reason if not Attempted: Not attempted due to medical condition or safety concerns  CARE Score: 88  Discharge Goal: Not Attempted  Walking 10 Feet on Uneven Surfaces  Reason if not Attempted: Not attempted due to medical condition or safety concerns  CARE Score: 88  Discharge Goal: Not Attempted  1 Step (Curb)  Reason if not Attempted: Not attempted due to medical condition or safety concerns  CARE Score: 88  Discharge Goal: Not Attempted  4 Steps  Reason if not Attempted: Not attempted due to medical condition or safety concerns  CARE Score: 88  Discharge Goal: Not Attempted  12 Steps  Reason if not Attempted: Not attempted due to medical condition or safety concerns  CARE Score: 88  Discharge Goal: Not Attempted  Wheel 50 Feet with Two Turns  Reason if not Attempted: Not attempted due to medical condition or safety concerns  CARE Score: 88  Discharge Goal: Supervision or touching assistance  Wheel 150 Feet  Reason if not Attempted: Not attempted due to medical condition or safety concerns  CARE Score: 88  Discharge Goal: Supervision or touching assistance    OCCUPATIONAL THERAPY:  Goals:             Short term goals  Time Frame for Short term goals: 1 week  Short term goal 1: Max A with clothing management/hygiene for toileting. Short term goal 2: Max A with toilet transfers. Short term goal 3: Max A with bathing hygiene. Short term goal 4: Max A with LB dressing using AE. Short term goal 5: Min A with UB dressing. Short term goal 6: Pt will set at EOB to complete self care tasks, requiring supervision with sitting balance. :  Long term goals  Time Frame for Long term goals : 2 week  Long term goal 1: Mod A with clothing management/hygiene for toileting. Long term goal 2: Mod A with toilet transfers. Long term goal 3: Mod A with bathing hygiene. Long term goal 4: Mod A with LB dressing using AE. Long term goal 5: Pt will verbalize DME needs.   Long term goals 6: Supervision with UB dressing. :    These goals were reviewed with this patient at the time of assessment and Armen Oates is in agreement    Plan of Care: Frequency:   [x] 5 days per week, 90 minutes per day     [] 5 days per week, 60 minutes per day                Plan  Current Treatment Recommendations: Strengthening, Balance Training, Functional Mobility Training, Endurance Training, Safety Education & Training, Patient/Caregiver Education & Training, Equipment Evaluation, Education, & procurement, Self-Care / ADL, Home Management Training            IRF-CAT  Eating  Assistance Needed: Setup or clean-up assistance  CARE Score: 5  Discharge Goal: Independent  Oral Hygiene  Assistance Needed: Setup or clean-up assistance  CARE Score: 5  Discharge Goal: Independent  Toileting Hygiene  Assistance Needed: Dependent  Comment: Cleanup post urinary incontinence, supine in bed  CARE Score: 1  Discharge Goal: Partial/moderate assistance  Shower/Bathe Self  Assistance Needed: Substantial/maximal assistance  CARE Score: 2  Discharge Goal: Partial/moderate assistance  Upper Body Dressing  Assistance Needed: Substantial/maximal assistance  CARE Score: 2  Discharge Goal: Partial/moderate assistance  Lower Body Dressing  Assistance Needed: Dependent  CARE Score: 1  Discharge Goal: Supervision or touching assistance  Putting On/Taking Off Footwear  Assistance Needed: Dependent  CARE Score: 1  Discharge Goal: Partial/moderate assistance  Toilet Transfer  Reason if not Attempted: Not attempted due to medical condition or safety concerns  CARE Score: 88  Discharge Goal: Partial/moderate assistance  Picking Up Object  Reason if not Attempted: Not attempted due to medical condition or safety concerns  CARE Score: 88  Discharge Goal: Supervision or touching assistance  Treatments may include IADL retraining, strengthening, safety education and training, patient/caregiver education and training, equipment evaluation/ training/procurement, neuromuscular reeducation, wheelchair mobility training. SPEECH THERAPY:   Plan of Care and Goals:   LTG    FIM Goals: Comprehension:    Expression:    Social:    Problem Solving:    Memory:                                                  IRF-CAT  Hearing, Speech, and Vision  Expression of Ideas and Wants: Without difficulty  Understanding Verbal and Non-Verbal Content: Understands  Cognitive Patterns  Cognitive Pattern Assessment Used: BIMS  Repetition of Three Words (First Attempt): 3  Temporal Orientation: Year: Correct  Temporal Orientation: Month: Missed by > 1 month or no answer  Temporal Orientation: Day:  Incorrect or no answer  Able to recall \"sock: No, could not recall  Able to recall \"blue\": No, could not recall  Able to recall \"bed\": No, could not recall  BIMS Summary Score: 6          Plan of Care:  Frequency:   [] 5 days per week, 60 minutes per day                            [x] Not appropriate for Speech Therapy  Treatments may include speech/language/communication therapy, cognitive training, group therapy, education, and/or dysphagia therapy based on the above goals. These goals were reviewed with this patient at the time of assessment and Arely Oleary is in agreement. CASE MANAGEMENT:  Goals:   Assist patient/family with discharge planning, patient/family counseling,  and coordination with insurance during ARU stay. Patient Goals: no complications, gain safe independent mobility to return to home setting with attendant to assist with IADLs, fall prevention               Activities Prior to Admit:      Active : No                  Arely Oleary will be seen a minimum of 3 hours of therapy per day/a minimum of 5 out of 7 days per week. [] In this rare instance due to the nature of this patient's medical involvement, this patient will be seen 15 hours per week (900 minutes within a 7 day period). Treatments may include therapeutic exercises, gait training, neuromuscular re-ed, transfer training, community reintegration, bed mobility, w/c mobility and training, self care, home mgmt, cognitive training, energy conservation,dysphagia tx, speech/language/communication therapy, group therapy, and patient/family education. In addition, dietician/nutritionist may monitor calorie count as well as intake and collaboratively work with SLP on dietary upgrades. Neuropsychology/Psychology may evaluate and provide necessary support.     Medical issues being managed closely and that require 24 hour availability of a physician:   [] Swallowing Precautions  [x] Bowel/Bladder Fx  [x] Weight bearing precautions   [x] Wound Care    [x] Pain Mgmt   [x] Infection Protection [x] DVT Prophylaxis   [x] Fall Precautions  [] Fluid/Electrolyte/Nutrition Balance   [] Voice Protection   [] Respiratory  [] Other:    Medical Prognosis: [] Good  [x] Fair    [] Guarded   Total expected IRF days:  15  Anticipated discharge destination:    [] Home Independently   [x] Home with supervision    []SNF     [] Other                                           Physician anticipated functional outcomes:  Ambulate household distances independently with assistive device. IPOC brief synthesis: Acute inpatient rehabilitation with occupational   physical therapy 180 minutes, 5 every 7   days will address basic and advancing mobility with self-care   instruction and adaptive equipment training. Caregiver education will   be offered. Expected length of stay prior to the supervised level of   functional discharge to home with home care is 15 days. Assessment and Plan:  1. MSSA bacteremia/wound infection-Abx as per ID  2. CAD-ASA/Indur  3. HTN-on meds  4. GI-bowel regimen/PPI/Carafate/Lactobacillus   5. Anemia-iron  6. Hypothyroidism-on Synthroid  7. Back pain-Lidoderm patch/Tramadol/Noroc PRN  8. History of DVT-on Xarelto  9. History of Sjogren's  10. Arthritis-Salsalate   11. History of herpes simplex-on Valtrex   12.  PT/OT            Case Mgmt: Electronically signed by OLIVER Garcia on 10/29/2021 at 10:12 AM      OT: Electronically signed by Lana Read OT on 10/29/21 at 3:46 PM CDT    PT:Electronically signed by Arya Luong PT on 10/30/2021 at 8:04 AM      RN: Electronically signed by Jack Corley RN on 10/28/21 at 4:32 PM CDT

## 2021-10-29 PROBLEM — E43 SEVERE MALNUTRITION (HCC): Chronic | Status: ACTIVE | Noted: 2021-10-29

## 2021-10-29 LAB
ALBUMIN SERPL-MCNC: 2.7 G/DL (ref 3.5–5.2)
ALP BLD-CCNC: 130 U/L (ref 35–104)
ALT SERPL-CCNC: <5 U/L (ref 5–33)
ANION GAP SERPL CALCULATED.3IONS-SCNC: 9 MMOL/L (ref 7–19)
AST SERPL-CCNC: 20 U/L (ref 5–32)
BACTERIA SPEC AEROBE CULT: NORMAL
BILIRUB SERPL-MCNC: 1 MG/DL (ref 0.2–1.2)
BUN BLDV-MCNC: 12 MG/DL (ref 8–23)
CALCIUM SERPL-MCNC: 8.3 MG/DL (ref 8.8–10.2)
CHLORIDE BLD-SCNC: 95 MMOL/L (ref 98–111)
CO2: 29 MMOL/L (ref 22–29)
CREAT SERPL-MCNC: 0.4 MG/DL (ref 0.5–0.9)
GFR AFRICAN AMERICAN: >59
GFR NON-AFRICAN AMERICAN: >60
GLUCOSE BLD-MCNC: 108 MG/DL (ref 74–109)
HCT VFR BLD CALC: 32.1 % (ref 37–47)
HEMOGLOBIN: 10.2 G/DL (ref 12–16)
MCH RBC QN AUTO: 27.3 PG (ref 27–31)
MCHC RBC AUTO-ENTMCNC: 31.8 G/DL (ref 33–37)
MCV RBC AUTO: 86.1 FL (ref 81–99)
PDW BLD-RTO: 17.7 % (ref 11.5–14.5)
PLATELET # BLD: 280 K/UL (ref 130–400)
PMV BLD AUTO: 9.7 FL (ref 9.4–12.3)
POTASSIUM REFLEX MAGNESIUM: 4.3 MMOL/L (ref 3.5–5)
PREALBUMIN: 14 MG/DL (ref 20–40)
RBC # BLD: 3.73 M/UL (ref 4.2–5.4)
SODIUM BLD-SCNC: 133 MMOL/L (ref 136–145)
TOTAL PROTEIN: 7.4 G/DL (ref 6.6–8.7)
WBC # BLD: 7.8 K/UL (ref 4.8–10.8)

## 2021-10-29 PROCEDURE — 97116 GAIT TRAINING THERAPY: CPT

## 2021-10-29 PROCEDURE — 6370000000 HC RX 637 (ALT 250 FOR IP): Performed by: PSYCHIATRY & NEUROLOGY

## 2021-10-29 PROCEDURE — 85027 COMPLETE CBC AUTOMATED: CPT

## 2021-10-29 PROCEDURE — 99223 1ST HOSP IP/OBS HIGH 75: CPT | Performed by: PSYCHIATRY & NEUROLOGY

## 2021-10-29 PROCEDURE — 6360000002 HC RX W HCPCS: Performed by: PSYCHIATRY & NEUROLOGY

## 2021-10-29 PROCEDURE — 1180000000 HC REHAB R&B

## 2021-10-29 PROCEDURE — 97162 PT EVAL MOD COMPLEX 30 MIN: CPT

## 2021-10-29 PROCEDURE — 97530 THERAPEUTIC ACTIVITIES: CPT

## 2021-10-29 PROCEDURE — 84134 ASSAY OF PREALBUMIN: CPT

## 2021-10-29 PROCEDURE — 97165 OT EVAL LOW COMPLEX 30 MIN: CPT

## 2021-10-29 PROCEDURE — 80053 COMPREHEN METABOLIC PANEL: CPT

## 2021-10-29 PROCEDURE — 2580000003 HC RX 258: Performed by: PSYCHIATRY & NEUROLOGY

## 2021-10-29 PROCEDURE — 97535 SELF CARE MNGMENT TRAINING: CPT

## 2021-10-29 RX ORDER — CLONIDINE HYDROCHLORIDE 0.1 MG/1
0.1 TABLET ORAL EVERY 4 HOURS PRN
Status: DISCONTINUED | OUTPATIENT
Start: 2021-10-29 | End: 2021-11-04 | Stop reason: HOSPADM

## 2021-10-29 RX ORDER — LISINOPRIL 10 MG/1
10 TABLET ORAL DAILY
Status: DISCONTINUED | OUTPATIENT
Start: 2021-10-29 | End: 2021-11-04 | Stop reason: HOSPADM

## 2021-10-29 RX ORDER — OXYCODONE HYDROCHLORIDE 5 MG/1
10 TABLET ORAL EVERY 4 HOURS PRN
Status: DISCONTINUED | OUTPATIENT
Start: 2021-10-29 | End: 2021-11-02

## 2021-10-29 RX ADMIN — CARVEDILOL 25 MG: 25 TABLET, FILM COATED ORAL at 08:15

## 2021-10-29 RX ADMIN — OXYCODONE 10 MG: 5 TABLET ORAL at 09:11

## 2021-10-29 RX ADMIN — SUCRALFATE 1 G: 1 TABLET ORAL at 20:11

## 2021-10-29 RX ADMIN — FERROUS SULFATE TAB 325 MG (65 MG ELEMENTAL FE) 325 MG: 325 (65 FE) TAB at 08:12

## 2021-10-29 RX ADMIN — LEVOTHYROXINE SODIUM 75 MCG: 75 TABLET ORAL at 05:53

## 2021-10-29 RX ADMIN — SODIUM CHLORIDE, PRESERVATIVE FREE 10 ML: 5 INJECTION INTRAVENOUS at 13:49

## 2021-10-29 RX ADMIN — TRAMADOL HYDROCHLORIDE 50 MG: 50 TABLET, COATED ORAL at 08:14

## 2021-10-29 RX ADMIN — SUCRALFATE 1 G: 1 TABLET ORAL at 08:14

## 2021-10-29 RX ADMIN — SUCRALFATE 1 G: 1 TABLET ORAL at 17:33

## 2021-10-29 RX ADMIN — ISOSORBIDE MONONITRATE 60 MG: 60 TABLET, EXTENDED RELEASE ORAL at 08:12

## 2021-10-29 RX ADMIN — OXYCODONE 10 MG: 5 TABLET ORAL at 13:47

## 2021-10-29 RX ADMIN — HYDROCODONE BITARTRATE AND ACETAMINOPHEN 1 TABLET: 7.5; 325 TABLET ORAL at 05:53

## 2021-10-29 RX ADMIN — SODIUM CHLORIDE, PRESERVATIVE FREE 10 ML: 5 INJECTION INTRAVENOUS at 05:52

## 2021-10-29 RX ADMIN — Medication 1 TABLET: at 08:13

## 2021-10-29 RX ADMIN — VALACYCLOVIR HYDROCHLORIDE 500 MG: 500 TABLET, FILM COATED ORAL at 08:12

## 2021-10-29 RX ADMIN — SODIUM CHLORIDE, PRESERVATIVE FREE 10 ML: 5 INJECTION INTRAVENOUS at 08:24

## 2021-10-29 RX ADMIN — Medication 1 CAPSULE: at 08:14

## 2021-10-29 RX ADMIN — FOLIC ACID 1 MG: 1 TABLET ORAL at 08:14

## 2021-10-29 RX ADMIN — CEFAZOLIN SODIUM 2000 MG: 2 INJECTION, SOLUTION INTRAVENOUS at 13:48

## 2021-10-29 RX ADMIN — RIVAROXABAN 20 MG: 20 TABLET, FILM COATED ORAL at 08:14

## 2021-10-29 RX ADMIN — POTASSIUM CHLORIDE 20 MEQ: 1500 TABLET, EXTENDED RELEASE ORAL at 20:11

## 2021-10-29 RX ADMIN — ISOSORBIDE MONONITRATE 60 MG: 60 TABLET, EXTENDED RELEASE ORAL at 20:11

## 2021-10-29 RX ADMIN — FUROSEMIDE 40 MG: 40 TABLET ORAL at 08:12

## 2021-10-29 RX ADMIN — THIAMINE HCL TAB 100 MG 250 MG: 100 TAB at 08:12

## 2021-10-29 RX ADMIN — NALOXEGOL OXALATE 25 MG: 12.5 TABLET, FILM COATED ORAL at 08:13

## 2021-10-29 RX ADMIN — SUCRALFATE 1 G: 1 TABLET ORAL at 12:44

## 2021-10-29 RX ADMIN — TRAMADOL HYDROCHLORIDE 50 MG: 50 TABLET, COATED ORAL at 20:11

## 2021-10-29 RX ADMIN — SODIUM CHLORIDE, PRESERVATIVE FREE 10 ML: 5 INJECTION INTRAVENOUS at 20:11

## 2021-10-29 RX ADMIN — LISINOPRIL 10 MG: 10 TABLET ORAL at 12:43

## 2021-10-29 RX ADMIN — CEFAZOLIN SODIUM 2000 MG: 2 INJECTION, SOLUTION INTRAVENOUS at 05:52

## 2021-10-29 RX ADMIN — MAGNESIUM OXIDE TAB 400 MG (240 MG ELEMENTAL MG) 400 MG: 400 (240 MG) TAB at 08:14

## 2021-10-29 RX ADMIN — PANTOPRAZOLE SODIUM 40 MG: 40 TABLET, DELAYED RELEASE ORAL at 08:12

## 2021-10-29 RX ADMIN — TRAMADOL HYDROCHLORIDE 50 MG: 50 TABLET, COATED ORAL at 13:48

## 2021-10-29 RX ADMIN — CEFAZOLIN SODIUM 2000 MG: 2 INJECTION, SOLUTION INTRAVENOUS at 20:11

## 2021-10-29 RX ADMIN — POTASSIUM CHLORIDE 20 MEQ: 1500 TABLET, EXTENDED RELEASE ORAL at 08:14

## 2021-10-29 RX ADMIN — CARVEDILOL 25 MG: 25 TABLET, FILM COATED ORAL at 16:52

## 2021-10-29 RX ADMIN — ASPIRIN 81 MG: 81 TABLET, COATED ORAL at 08:13

## 2021-10-29 ASSESSMENT — PAIN SCALES - GENERAL
PAINLEVEL_OUTOF10: 0
PAINLEVEL_OUTOF10: 8
PAINLEVEL_OUTOF10: 4
PAINLEVEL_OUTOF10: 4
PAINLEVEL_OUTOF10: 0
PAINLEVEL_OUTOF10: 4
PAINLEVEL_OUTOF10: 8
PAINLEVEL_OUTOF10: 9
PAINLEVEL_OUTOF10: 8
PAINLEVEL_OUTOF10: 3
PAINLEVEL_OUTOF10: 8
PAINLEVEL_OUTOF10: 2

## 2021-10-29 ASSESSMENT — PAIN DESCRIPTION - FREQUENCY
FREQUENCY: INTERMITTENT
FREQUENCY: INTERMITTENT
FREQUENCY: CONTINUOUS
FREQUENCY: INTERMITTENT

## 2021-10-29 ASSESSMENT — PAIN DESCRIPTION - ONSET
ONSET: GRADUAL
ONSET: ON-GOING
ONSET: GRADUAL
ONSET: GRADUAL

## 2021-10-29 ASSESSMENT — PAIN DESCRIPTION - DESCRIPTORS
DESCRIPTORS: ACHING;DISCOMFORT
DESCRIPTORS: SHARP

## 2021-10-29 ASSESSMENT — PAIN DESCRIPTION - PAIN TYPE
TYPE: ACUTE PAIN;SURGICAL PAIN
TYPE: ACUTE PAIN

## 2021-10-29 ASSESSMENT — PAIN DESCRIPTION - ORIENTATION
ORIENTATION: LOWER;RIGHT
ORIENTATION: LOWER
ORIENTATION: LOWER;RIGHT
ORIENTATION: LOWER;RIGHT
ORIENTATION: MID

## 2021-10-29 ASSESSMENT — PAIN - FUNCTIONAL ASSESSMENT
PAIN_FUNCTIONAL_ASSESSMENT: PREVENTS OR INTERFERES SOME ACTIVE ACTIVITIES AND ADLS
PAIN_FUNCTIONAL_ASSESSMENT: ACTIVITIES ARE NOT PREVENTED
PAIN_FUNCTIONAL_ASSESSMENT: PREVENTS OR INTERFERES SOME ACTIVE ACTIVITIES AND ADLS

## 2021-10-29 ASSESSMENT — PAIN DESCRIPTION - LOCATION
LOCATION: BACK
LOCATION: BACK;HIP
LOCATION: BACK;HIP
LOCATION: BACK

## 2021-10-29 ASSESSMENT — PAIN DESCRIPTION - PROGRESSION
CLINICAL_PROGRESSION: GRADUALLY IMPROVING
CLINICAL_PROGRESSION: GRADUALLY IMPROVING
CLINICAL_PROGRESSION: NOT CHANGED
CLINICAL_PROGRESSION: NOT CHANGED
CLINICAL_PROGRESSION: GRADUALLY IMPROVING

## 2021-10-29 NOTE — CONSULTS
INFECTIOUS DISEASES CONSULT NOTE    Patient:  Danica Rodríguez 74 y.o. female  ROOM # [unfilled]  YOB: 1955  MRN: [id number]  CSN:  [id number]  Admit date: 10/28/2021   Admitting Physician: Miladys Cramer MD  Primary Care Physician: Katie Cedeño MD  REFERRING PROVIDER: No ref. provider found    Reason for Consultation: Bacteremia    History of Present Illness/Chief Complaint: Pleasant 78-year-old woman. Known to me from previous consultation. Earlier this year she had had a prolonged staph aureus bacteremia. She had also had a right hip infection following repair of a gluteus muscle tear. In addition she had had lumbar paraspinal infection. She completed an extended course of antibiotic treatment (close to 12 weeks IV therapy) followed by oral treatment. .  She had a nonhealing wound in the right hip area. She eventually had undergone debridement and flap closure and when I last saw her she was doing very well. She had had a fall at home. She developed back pain. She was readmitted to the hospital at [facility]. She was felt to have a thoracic compression fracture. She eventually underwent kyphoplasty. During that admission she had also had MSSA bacteremia. She has been receiving IV antimicrobial therapy. She had surgery to open up the operative site at the right hip and that area is currently being packed. Culture from right hip. Grown MSSA as well. She has been transferred to rehab for additional management. Infectious disease asked to evaluate and assist with antimicrobial treatment.     Current Scheduled Medications:    traMADol  50 mg Oral TID    aspirin EC  81 mg Oral Daily    carvedilol  25 mg Oral BID WC    docusate sodium  100 mg Oral Daily    ferrous sulfate  325 mg Oral Daily with breakfast    folic acid  1 mg Oral Daily    furosemide  40 mg Oral Daily    isosorbide mononitrate  60 mg Oral BID    levothyroxine  75 mcg Oral Daily    lidocaine  1 patch TransDERmal Daily    magnesium oxide  400 mg Oral Daily    therapeutic multivitamin-minerals  1 tablet Oral Daily    naloxegol  25 mg Oral QAM    pantoprazole  40 mg Oral Daily    potassium chloride  20 mEq Oral BID    rivaroxaban  20 mg Oral Daily    salsalate  500 mg Oral Daily    sucralfate  1 g Oral 4x Daily PC & HS    valACYclovir  500 mg Oral Daily    vitamin B-1  250 mg Oral Daily    ceFAZolin  2,000 mg IntraVENous Q8H    lactobacillus  1 capsule Oral Daily    sodium chloride flush  5-40 mL IntraVENous BID     Current PRN Medications:  oxyCODONE, cloNIDine, acetaminophen, fluticasone, nitroGLYCERIN, acetaminophen, polyethylene glycol, sodium chloride flush    Allergies: Allergies   Allergen Reactions    Amoxicillin Rash    Lipitor Rash    Niaspan [Niacin Er] Rash    Penicillins Rash    Relafen [Nabumetone] Rash    Zocor [Simvastatin] Rash     Muscle cramps     Past Medical History: Rheumatoid arthritis. Previous staph aureus bacteremia. Cardiac pacemaker placement. Previous lumbar's paraspinal infection. Hypertension. Thyroid dysfunction. Peripheral vascular disease. Psoriasis. Osteoarthritis. Past Surgical History: Cervical corpectomy. Cataract extraction. Coronary angioplasty with stenting. Right hip surgery to repair a gluteus tendon tear. Irrigation debridement following infection of right hip gluteus repair breakdown. Previous flap closure of right hip  Right total knee arthroplasty. Previous lumbar fusion and lumbar discectomy. Social History: . No tobacco, alcohol, or illicit drug use. Family History: Cancer. Heart disease. Exposure History: No close contacts of been ill. Review of Systems: No chest pain or pressure. No cough or shortness of breath. No nausea or vomiting. No diarrhea. No urinary symptoms.     Vital Signs:  BP (!) 180/92   Pulse 80   Temp 97.8 °F (36.6 °C) (Temporal)   Resp 18   Ht 5' 6\" (1.676 m)   Wt 164 lb 6.4 oz (74.6 kg)   SpO2 96%   BMI 26.53 kg/m²  Temp (24hrs), Av.9 °F (36.1 °C), Min:96.2 °F (35.7 °C), Max:97.8 °F (36.6 °C)    Physical Exam:   Vital signs reviewed. Lungs clear without crackles  Heart regular rhythm without murmur  Abdomen is soft and nontender  Skin without rash  Right hip with small portion open distally. No purulent drainage. No surrounding erythema. Currently it is packed. Pacemaker site left upper chest without erythema, warmth, or swelling. Lab Results:  CBC:   Recent Labs     10/29/21  0407   WBC 7.8   HGB 10.2*   HCT 32.1*        CMP:   Recent Labs     10/29/21  0407   *   K 4.3   CL 95*   CO2 29   BUN 12   CREATININE 0.4*   CALCIUM 8.3*   BILITOT 1.0   ALKPHOS 130*   ALT <5*   AST 20   GLUCOSE 108     Impression:   1. MSSA bacteremia  2. Rheumatoid arthritis  3. Wound infection right hip area  4.   Recent T12 compression fracture with kyphoplasty    Recommendations:    Continue cefazolin  Planning 4 weeks of treatment  Physical therapy  Will watch for any symptoms suggestive of a new metastatic focus of staph infection  Continue supportive care  Continue to follow    Xavi Wesley MD  10/29/21  12:18 PM

## 2021-10-29 NOTE — H&P
[facility]   History and Physical        Patient:   Taina Torres  MR#:    [id number]  Account Number:                   [id number]      Room:    [facility]-   YOB: 1955  Date of Progress Note: 10/29/2021  Time of Note                           10:22 AM  Attending Physician:  Fe Hutson MD        Admitting diagnosis:Bacteremia,Methicillin susceptible Staphylococcus aureus infection as the cause of diseases classified elsewhere    Secondary diagnoses:Charcot's joint, left ankle and foot,Unspecified asthma, uncomplicated,Psoriasis vulgaris,Hypothyroidism, unspecified,Heart disease, unspecified,Atherosclerotic heart disease of native coronary artery without angina pectoris,Hyperlipidemia, unspecified,Essential (primary) hypertension,Rheumatoid arthritis with rheumatoid factor of multiple sites without organ or systems involvement,Sick sinus syndrome,Spinal stenosis, cervical region,Cervical disc disorder with myelopathy,  high cervical region,Moderate protein-calorie malnutrition,Urinary tract infection, site not specified,Wedge compression fracture of T11-T12 vertebra, initial encounter for closed fracture,Age-related osteoporosis without current pathological fracture,Iron deficiency anemia, unspecified,Long term (current) use of anticoagulants,Chronic embolism and thrombosis of unspecified deep veins of left lower extremity. Acute T12 compression fracture s/p kyphoplasty     CHIEF COMPLAINT:  MSSA Bactermia/status post myocutaneous and fasciocutaneous flap advancements for coverage of a chronic right hip wound/s/p I&D of right hip wound/s/p kyphoplasty       HISTORY OF PRESENT ILLNESS:   This 74 y.o. female admitted to [facility] on 10/21/2021 after suffering a fall off of her bed at home on 10/18/2021. She presented to the ED on the 10/21 with complaint of back pain. Imaging revealed a T12 compression. Initially plan was to treat patient conservatively with TLSO brace.  Subsequent workup identified staph bacteremia, confirmed by 2 of 2 peripheral blood cultures, and a UTI with 4+ bacteria. Out of concern that the UTI might not represent the primary source of the bacteremia, additional workup was appropriately performed. CT imaging of the right hip subsequently identified a fluid collection and soft tissue induration, which triggered concern for an abscess. Mrs. Pita Mcgovern is 5 weeks and 3 days status post myocutaneous and fasciocutaneous flap advancements for coverage of a chronic right hip wound. Incidentally, tissue cultures were sent at the time of her coverage procedure, and these resulted in no growth. Understanding the patient's prior surgical history, the findings on CT are not unexpected. In other words, a deep space fluid collection and tissue induration 5 weeks after the procedure described above is not at all surprising, and in isolation, would not be a cause for concern. Given the circumstances of bacteremia; however, ruling out abscess or infected seroma pocket is necessary. Even if the fluid collection does not represent the etiology of the bacteremia, there is concern that it could have potentially become seeded. Examination of the right hip was reassuring. There was no cellulitis or warmth. The surgical incision was well healed. The patient reports that she has been ambulating without difficulty. She has no tenderness to palpation over the fluid collection. Although it is essential to rule out infection at this site, it is premature to take the patient to the OR and re-create a wound that she previously struggled to manage for 7 months. A very reasonable course of action would be to attempt to aspirate the fluid collection under fluoroscopic guidance. If the contents are frankly purulent or if the fluid subsequently resulted in growth on culture, then  perform an I&D.   If the aspiration reveals seroma and no growth on culture, then there is an opportunity to spare patient re-creation of a previously very difficult wound. On 10/24,  CT imaging indicated a small fluid collection at the right hip at the location of her previous wound. This was aspirated and Gram stain showed presence of gram-positive cocci. Decision was made to proceed with I&D. Patient tolerated procedure well. Infectious disease was consulted for antibiotic management. Regarding her T12 compression fx, she has been unresponsive to nonoperative treatment modalities including analgesics including opioids, lidocaine patch, nonsteroidal anti-inflammatories, and muscle relaxants as well as bracing with TLSO. These fractures were sustained with minimal trauma and she has low bone density consistent with osteoporosis. She has been unresponsive to nonoperative treatment modalities including analgesics including opioids, lidocaine patch, nonsteroidal anti-inflammatories, and muscle relaxants as well as bracing with TLSO. These fractures were sustained with minimal trauma and he has low bone density consistent with osteoporosis. Based on these findings, decision was made to perform [facility] Balloon Kyphoplasty at T12 on 10/26/2021 by Dr Alma Bailon. She tolerated procedure well. She is now medically stable and is participating with therapy. She is ready to begin rehab with plan of returning home after rehab dc with support from her 24/7 caregiver and family. REVIEW OF SYSTEMS:  Constitutional - No fever or chills. No diaphoresis or significant fatigue. HENT -  No tinnitus or significant hearing loss. Eyes - no sudden vision change or eye pain  Respiratory - no significant shortness of breath or cough  Cardiovascular - no chest pain No palpitations or significant leg swelling  Gastrointestinal - no abdominal swelling or pain. Genitourinary - No difficulty urinating, dysuria  Musculoskeletal + back pain no myalgia. Skin - no color change or rash  Neurologic - No seizures.   No lateralizing weakness. Hematologic - no easy bruising or excessive bleeding. Psychiatric - no severe anxiety or nervousness. All other review of systems are negative. Past Medical History:      Diagnosis Date    CAD (coronary artery disease)     S/p Stent to right coronary artery 9/2010. S/p myocardial infarction May 2013.  Cellulitis     LT LEG IN PAST    History of blood transfusion     History of DVT (deep vein thrombosis)     LT    History of neutropenia     History of pulmonary fibrosis     Hx of blood clots 1992    LT LEG    Hypercholesterolemia     Hypertension     Intrinsic asthma     Pacemaker 11/17/2016    Post op infection     \"right knee leaking after my replacement\"    Rheumatoid arteritis ([address]     Sinus node dysfunction ([address]     Staph aureus infection     Status post placement of implantable loop recorder 2/13/15, 3/30/15    2/19/15  removed, infection    Syncope 2/11/2015    Thyroid disease     HYPOTHYROIDISM       Past Surgical History:      Procedure Laterality Date    BACK SURGERY  01/01/2018    CARDIAC CATHETERIZATION  5/14/14  [facility]DL    normal LV function. EF 60%    CARDIAC CATHETERIZATION  1/26/15  [facility]    EF 60%, Left circ has long 80-90% in small 2mm vessel, diffuse disease in RCA    CORONARY ANGIOPLASTY WITH STENT PLACEMENT  2014    DIAGNOSTIC CARDIAC CATH LAB PROCEDURE      Right coronary artery stent.  9/2010    JOINT REPLACEMENT      LEEP      PACEMAKER INSERTION  11/17/2016    Dr Tatiana Carrera; [employer]    PACEMAKER PLACEMENT      med[employer]    [place] INCIS/DRAIN THIGH/KNEE ABSCESS,DEEP Right 10/23/2018    KNEE INCISION AND DRAINAGE performed by Lolly Noguera MD at [place] [id number] Right 9/11/2018    KNEE TOTAL ARTHROPLASTY performed by Lolly Noguera MD at [place] [id number] Right 12/6/2018    KNEE EXPLANT AND PLACEMENT OF ANTIBIOTIC SPACER performed by Lolly Noguera MD at [address] [place] [address] Medications in Hospital:      Current Facility-Administered Medications:     oxyCODONE (ROXICODONE) immediate release tablet 10 mg, 10 mg, Oral, Q4H PRN, Tahira Johnson MD, 10 mg at 10/29/21 0911    cloNIDine (CATAPRES) tablet 0.1 mg, 0.1 mg, Oral, Q4H PRN, Tahira Johnson MD    traMADol (ULTRAM) tablet 50 mg, 50 mg, Oral, TID, Tahira Johnson MD, 50 mg at 10/29/21 0814    acetaminophen (TYLENOL) tablet 650 mg, 650 mg, Oral, Q4H PRN, Tahira Johnson MD    aspirin EC tablet 81 mg, 81 mg, Oral, Daily, Tahira Johnson MD, 81 mg at 10/29/21 0813    carvedilol (COREG) tablet 25 mg, 25 mg, Oral, BID WC, Tahira Johnson MD, 25 mg at 10/29/21 0815    docusate sodium (COLACE) capsule 100 mg, 100 mg, Oral, Daily, Tahira Johnson MD    ferrous sulfate (IRON 325) tablet 325 mg, 325 mg, Oral, Daily with breakfast, Tahira Johnson MD, 325 mg at 10/29/21 0812    fluticasone (FLONASE) 50 MCG/ACT nasal spray 1 spray, 1 spray, Each Nostril, Daily PRN, Tahira Johnson MD    folic acid (FOLVITE) tablet 1 mg, 1 mg, Oral, Daily, Tahira Johnson MD, 1 mg at 10/29/21 0814    furosemide (LASIX) tablet 40 mg, 40 mg, Oral, Daily, Tahira Johnson MD, 40 mg at 10/29/21 3033    isosorbide mononitrate (IMDUR) extended release tablet 60 mg, 60 mg, Oral, BID, Tahira Johnson MD, 60 mg at 10/29/21 0812    levothyroxine (SYNTHROID) tablet 75 mcg, 75 mcg, Oral, Daily, Tahira Johnson MD, 75 mcg at 10/29/21 0553    lidocaine 4 % external patch 1 patch, 1 patch, TransDERmal, Daily, Tahira Johnson MD, 1 patch at 10/29/21 0812    magnesium oxide (MAG-OX) tablet 400 mg, 400 mg, Oral, Daily, Tahira Johnson MD, 400 mg at 10/29/21 0814    therapeutic multivitamin-minerals 1 tablet, 1 tablet, Oral, Daily, Tahira Johnson MD, 1 tablet at 10/29/21 0813    naloxegol (MOVANTIK) tablet 25 mg, 25 mg, Oral, QAM, Tahira Johnson MD, 25 mg at 10/29/21 0813    nitroGLYCERIN (NITROSTAT) SL tablet 0.4 mg, 0.4 mg, SubLINGual, Q5 Min PRN, Tahira Johnson MD  [facility] pantoprazole (PROTONIX) tablet 40 mg, 40 mg, Oral, Daily, Susan Bain MD, 40 mg at 10/29/21 6100    potassium chloride (KLOR-CON M) extended release tablet 20 mEq, 20 mEq, Oral, BID, Susan Bain MD, 20 mEq at 10/29/21 0814    rivaroxaban (XARELTO) tablet 20 mg, 20 mg, Oral, Daily, Susan Bain MD, 20 mg at 10/29/21 0814    salsalate (DISALCID) tablet 500 mg, 500 mg, Oral, Daily, Susan Bain MD    sucralfate (CARAFATE) tablet 1 g, 1 g, Oral, 4x Daily PC & HS, Susan Bain MD, 1 g at 10/29/21 0814    valACYclovir (VALTREX) tablet 500 mg, 500 mg, Oral, Daily, Susan Bain MD, 500 mg at 10/29/21 7786    thiamine mononitrate tablet 250 mg, 250 mg, Oral, Daily, Susan Bain MD, 250 mg at 10/29/21 2689    acetaminophen (TYLENOL) tablet 650 mg, 650 mg, Oral, Q4H PRN, Susan Bain MD    polyethylene glycol (GLYCOLAX) packet 17 g, 17 g, Oral, Daily PRN, Susan Bain MD    ceFAZolin (ANCEF) 2000 mg in dextrose 4 % 100 mL IVPB (premix), 2,000 mg, IntraVENous, Q8H, Susan Bain MD, Stopped at 10/29/21 0659    lactobacillus (CULTURELLE) capsule 1 capsule, 1 capsule, Oral, Daily, Susan Bain MD, 1 capsule at 10/29/21 0814    sodium chloride flush 0.9 % injection 5-40 mL, 5-40 mL, IntraVENous, BID, Susan Bain MD, 10 mL at 10/29/21 0824    sodium chloride flush 0.9 % injection 5-40 mL, 5-40 mL, IntraVENous, PRN, Susan Bain MD, 10 mL at 10/29/21 5871    Allergies:  Amoxicillin, Lipitor, Niaspan [niacin er], Penicillins, Relafen [nabumetone], and Zocor [simvastatin]    Social History:   TOBACCO:   reports that she has never smoked. She has never used smokeless tobacco.  ETOH:   reports no history of alcohol use.     Family History:       Problem Relation Age of Onset    Cancer Mother         LUNG CA    Heart Attack Father 39        FATAL MI           Physical Exam:    Vitals: BP (!) 216/90   Pulse 74   Temp 96.4 °F (35.8 °C) (Temporal)   Resp 18   Ht 5' 6\" (1.676 m)   Wt 164 lb 6.4 oz (74.6 kg)   SpO2 96%   BMI 26.53 kg/m²     Constitutional - well developed, well nourished. Eyes - conjunctiva normal.  Pupils not tested  Ear, nose, throat -hearing intact to finger rub No scars, masses, or lesions over external nose or ears, no atrophy of tongue  Neck-symmetric, no masses noted, no jugular vein distension  Respiration- chest wall appears symmetric, good expansion,   normal effort without use of accessory muscles  Musculoskeletal - no significant wasting of muscles noted, no bony deformities  Extremities-no clubbing, cyanosis or edema  Skin - warm, dry, and intact. No rash, erythema, or pallor.   Psychiatric - mood, affect, and behavior appear normal.      Neurological exam  Awake, alert, fluent oriented x 3 appropriate affect  Attention and concentration appear appropriate  Recent and remote memory appears unremarkable  Speech normal without dysarthria  No clear issues with language of fund of knowledge    Cranial Nerve Exam   CN II- Visual fields grossly unremarkable  CN III, IV,VI-EOMI, No nystagmus, conjugate eye movements, no ptosis  CN V-sensation intact to LT over face  CN VII-no facial assymetry  CN VIII-Hearing intact to finger rub  CN IX and X- Palate not tested  CN XI-not test shoulder shrug  CN XII-Tongue midline with no fasciculations or fibrillations    Motor Exam  Antigravity throughout upper extremities bilaterally, no cogwheeling, normal tone  Unable to elevate LE moves feet    Sensory Exam  Sensation intact to light touch and temperature upper and lower extremities bilaterally    Reflexes   Not tested    Tremors- no tremors in hands or head noted    Gait  Not tested    Coordination  Finger to nose-unremarkable        CBC:   Recent Labs     10/29/21  0407   WBC 7.8   HGB 10.2*          BMP:    Recent Labs     10/29/21  0407   *   K 4.3   CL 95*   CO2 29   BUN 12   CREATININE 0.4*   GLUCOSE 108       Hepatic:   Recent Labs     10/29/21  0407   AST 20   ALT <5* BILITOT 1.0   ALKPHOS 130*       Lipids: No results for input(s): CHOL, HDL in the last 72 hours. Invalid input(s): LDLCALCU    INR:   Recent Labs     10/28/21  1952   INR 1.50*           Assessment and Plan     1. MSSA bacteremia/wound infection-Abx as per ID  2. CAD-ASA/Indur  3. HTN-on meds  4. GI-bowel regimen/PPI/Carafate/Lactobacillus   5. Anemia-iron  6. Hypothyroidism-on Synthroid  7. Back pain-Lidoderm patch/Tramadol/Noroc PRN  8. History of DVT-on Xarelto  9. History of Sjogren's  10. Arthritis-Salsalate   11. History of herpes simplex-on Valtrex   12. PT/OT      Patient's functional assessment  Prior to hospitalization the patient was continent of bowel and continent of bladder    Current therapy  Requires PT, OT and/or speech therapy    Anticipated discharge approximately 15 days    Functional assessment  All notes from reehab data were reviewed regarding the patient's functional status. Anticipated discharge setting  Home with home care    No clear barriers to discharge    The patient appears to be an appropriate candidate for inpatient rehabilitation    Sufficiently stable: Patient's condition is sufficiently stable at the time of admission to allow the patient to actively participate in an intensive rehabilitation program    Close medical supervision: A rehabilitation physician, or other licensed treating physician with specialized training and experience in inpatient rehabilitation, will conduct face-to-face visits with the patient a minimum of at least 3 days per week throughout the patient's stay    This patient requires close medical supervision for the active management of the ongoing conditions and potential complications stated in the admission note    Intensive rehabilitation nursing: The patient demonstrates the need for 24-hour rehabilitation nursing care for active management of the multiple medical issues documented in the admission note    Appropriate therapy needed:  The erroneous, or at times, nonsensical words or phrases may be inadvertently transcribed.  Although I have reviewed the note for such errors, some may still exist.

## 2021-10-29 NOTE — PROGRESS NOTES
Physical Therapy Evaluation Note  DATE:  10/29/2021  NAME:  Danica Rodríguez  :  1955  (66 y.o.,female)  MRN:  [id number]    HEIGHT:  Height: 5' 6\" (167.6 cm)  WEIGHT:  Weight: 164 lb 6.4 oz (74.6 kg)    PATIENT DIAGNOSIS(ES):    Diagnosis: T12 compression fx s/p kyphoplasty, I&D right hip     Additional Pertinent Hx: lumbar decompression/fusion, C6 corpectomy, L1-2 disectomy, I&D psoas abscess, L charcot ankle/foot, HTN, RA, spinal stenosis, cervical disc myelopathy, chronic emboism and thrombosis DVTs, osteoporosis  RESTRICTIONS/PRECAUTIONS:    Restrictions/Precautions  Restrictions/Precautions: Weight Bearing, Fall Risk  Required Braces or Orthoses?: Yes  Implants present? : Pacemaker  Position Activity Restriction  Spinal Precautions: No Bending, No Lifting, No Twisting       PAIN:  Pain Level: 2  Pain Type: Acute pain, Surgical pain    Pain Location: Back     Pain Orientation: Lower, Right      STRENGTH  Strength RLE  Comment: grossly 2-/5  Strength LLE  Comment: grossly 2-/5  ROM  AROM RLE (degrees)  RLE General AROM: pt has difficulty moving BLE on command  AROM LLE (degrees)  LLE General AROM: pt has diifficulty actively moving BLE on command   POSTURE/BALANCE  Balance  Posture: Poor  Sitting - Static: Poor       ACTIVITY TOLERANCE  Activity Tolerance  Activity Tolerance: Patient limited by pain, Patient limited by fatigue, Treatment limited secondary to medical complications (free text)      BED MOBILITY  Bed Mobility  Rolling: Maximal assistance (x2)  Supine to Sit: Dependent/Total (x2 to manage trunk and BLE, dep in sitting balance )  Sit to Supine: Dependent/Total (x2)  Scooting: Dependent/Total (x2 pad to adjust in bed )  Comment: pt unable to assist with supine<>sit tf requiring total asssit in rolling and BLE and trunk management to EOB  TRANSFERS  Sit to Stand: Dependent/Total (pt attempted one stand unsuccessful clearing bottom off bed)             GOALS:  Short term goals  Time Frame for Short term goals: 1-2 weeks  Short term goal 1: supine<>sit ModA  Short term goal 2: functional transfers w/FWW ModA  Short term goal 3: ambulate w/FWW w/ModA 25 ft   Short term goal 4: WC mobility [place] 25 ft    Long term goals  Time Frame for Long term goals : 3-4 weeks  Long term goal 1: bed mobiity [place]io  Long term goal 2: functional transfers [place] w/FWW  Long term goal 3: ambulate w/FWW Min A 50 ft   Long term goal 4: WC mobilit SBA 50 ft   HOME LIVING:     Type of Home: House  Home Layout: One level  Home Access: Level entry        ASSESSMENT (IMPAIRMENTS/BARRIERS): Body structures, Functions, Activity limitations: Decreased functional mobility , Decreased ROM, Decreased strength, Decreased endurance, Decreased balance, Decreased coordination, Increased pain, Decreased posture  Assessment: pt presents to inital PT ealuation with decreased functional mobility d/t decreased strength, endurance and balance. pt has difficulty in initating bed mobiilty requiring maxAx2 to initate and lifting to complete. pt requires dep/total supine<>sit and demonstrates poor static sitting balance with retropulsive behavior while donning TLSO. pt shows low participation and motivation to assit with bed mobility and tfs, limiting therapeutic progress during sessions.    Activity Tolerance: Patient limited by pain, Patient limited by fatigue, Treatment limited secondary to medical complications (free text)  Specific instructions for Next Treatment: sitting EOB tolerance, static balance   PLAN:  Plan  Times per week: 5-6x  Times per day:  (1-2x)  Plan weeks: 3-4 weeks  Specific instructions for Next Treatment: sitting EOB tolerance, static balance   Current Treatment Recommendations: Strengthening, ROM, Balance Training, Functional Mobility Training, Transfer Training, Gait Training, Stair training, Wheelchair Mobility Training, Endurance Training, Pain Management, Patient/Caregiver Education & Training, Safety Education & Training  Discharge Recommendations: Home with Home health PT, 24 hour supervision or assist  PATIENT REQUIRES AND IS REASONABLY EXPECTED TO ACTIVELY PARTICIPATE IN AT LEAST 3 HOURS OF INTENSIVE THERAPY PER DAY AT LEAST 5 DAYS PER WEEK, AND BE EXPECTED TO MAKE MEASURABLE IMPROVEMENT THAT WILL BE OF PRACTICAL VALUE TO IMPROVE THE PATIENT'S FUNCTIONAL CAPACITY OR ADAPTATION TO IMPAIRMENTS.    PATIENT GOAL FOR REHAB:  RETURN TO PRIOR LEVEL OF FUNCTION       IRF/CAT  Roll Left and Right  Assistance Needed: Substantial/maximal assistance  CARE Score: 2  Discharge Goal: Partial/moderate assistance    Sit to Lying  Assistance Needed: Dependent  CARE Score: 1  Discharge Goal: Partial/moderate assistance    Lying to Sitting on Side of Bed  Assistance Needed: Dependent  CARE Score: 1  Discharge Goal: Partial/moderate assistance    Sit to Stand  Reason if not Attempted: Not attempted due to medical condition or safety concerns  CARE Score: 88  Discharge Goal: Partial/moderate assistance    Chair/Bed-to-Chair Transfer  Reason if not Attempted: Not attempted due to medical condition or safety concerns  CARE Score: 88  Discharge Goal: Partial/moderate assistance    Car Transfer  Reason if not Attempted: Not attempted due to medical condition or safety concerns  CARE Score: 88  Discharge Goal: Partial/moderate assistance    Walk 10 Feet  Reason if not Attempted: Not attempted due to medical condition or safety concerns  CARE Score: 88  Discharge Goal: Partial/moderate assistance    Walk 50 Feet with Two Turns  Reason if not Attempted: Not attempted due to medical condition or safety concerns  CARE Score: 88  Discharge Goal: Partial/moderate assistance    Walk 150 Feet  Reason if not Attempted: Not attempted due to medical condition or safety concerns  CARE Score: 88  Discharge Goal: Not Attempted    Walking 10 Feet on Uneven Surfaces  Reason if not Attempted: Not attempted due to medical condition or safety concerns  CARE Score: 88  Discharge Goal: Not Attempted    1 Step (Curb)  Reason if not Attempted: Not attempted due to medical condition or safety concerns  CARE Score: 88  Discharge Goal: Not Attempted    4 Steps  Reason if not Attempted: Not attempted due to medical condition or safety concerns  CARE Score: 88  Discharge Goal: Not Attempted    12 Steps  Reason if not Attempted: Not attempted due to medical condition or safety concerns  CARE Score: 88  Discharge Goal: Not Attempted    Wheel 50 Feet with Two Turns  Reason if not Attempted: Not attempted due to medical condition or safety concerns  CARE Score: 88  Discharge Goal: Supervision or touching assistance    Wheel 150 Feet  Reason if not Attempted: Not attempted due to medical condition or safety concerns  CARE Score: 88  Discharge Goal: Supervision or touching assistance      LAST TREATMENT TIME  PT Individual Minutes  Time In: 1430  Time Out: 1500  Minutes: 30

## 2021-10-29 NOTE — PLAN OF CARE
Nutrition Problem #1: Severe malnutrition, In context of chronic illness  Intervention: Food and/or Nutrient Delivery: Continue Current Diet, Start Oral Nutrition Supplement  Nutritional Goals: Po intake 50% or greater of meals and ONS. wound healing.

## 2021-10-29 NOTE — PROGRESS NOTES
to inadequate protein-energy intake as evidenced by poor intake prior to admission, weight loss greater than or equal to 10% in 6 months, wounds    Nutrition Interventions:   Food and/or Nutrient Delivery:  Continue Current Diet, Start Oral Nutrition Supplement  Nutrition Education/Counseling:  No recommendation at this time   Coordination of Nutrition Care:  Continue to monitor while inpatient    Goals:  Po intake 50% or greater of meals and ONS. wound healing. Nutrition Monitoring and Evaluation:   Food/Nutrient Intake Outcomes:  Food and Nutrient Intake, Supplement Intake  Physical Signs/Symptoms Outcomes:  Biochemical Data, Nutrition Focused Physical Findings, Skin, Weight     Discharge Planning:     Too soon to determine     Electronically signed by Ruddy Yi MS, RD, LD on 10/29/21 at 2:55 PM CDT    Contact: [phone]

## 2021-10-29 NOTE — PLAN OF CARE
Problem: Infection:  Goal: Will remain free from infection  Description: Will remain free from infection  Outcome: Ongoing     Problem: Safety:  Goal: Free from accidental physical injury  Description: Free from accidental physical injury  Outcome: Ongoing  Goal: Free from intentional harm  Description: Free from intentional harm  Outcome: Ongoing     Problem: Daily Care:  Goal: Daily care needs are met  Description: Daily care needs are met  Outcome: Ongoing     Problem: Pain:  Goal: Patient's pain/discomfort is manageable  Description: Patient's pain/discomfort is manageable  Outcome: Ongoing  Goal: Pain level will decrease  Description: Pain level will decrease  Outcome: Ongoing  Goal: Control of acute pain  Description: Control of acute pain  Outcome: Ongoing  Goal: Control of chronic pain  Description: Control of chronic pain  Outcome: Ongoing     Problem: Falls - Risk of:  Goal: Will remain free from falls  Description: Will remain free from falls  Outcome: Ongoing  Goal: Absence of physical injury  Description: Absence of physical injury  Outcome: Ongoing     Problem: Urinary Elimination:  Goal: Signs and symptoms of infection will decrease  Description: Signs and symptoms of infection will decrease  Outcome: Ongoing  Goal: Ability to reestablish a normal urinary elimination pattern will improve - after catheter removal  Description: Ability to reestablish a normal urinary elimination pattern will improve  Outcome: Ongoing  Goal: Complications related to the disease process, condition or treatment will be avoided or minimized  Description: Complications related to the disease process, condition or treatment will be avoided or minimized  Outcome: Ongoing

## 2021-10-29 NOTE — PROGRESS NOTES
Occupational Therapy  Facility/Department: [facility] 8 REHAB UNIT  Initial Assessment   NAME: Ranjana Rodriguez  : 1955  MRN: [id number]    Date of Service: 10/29/2021   10/29/21 1023   Restrictions/Precautions   Restrictions/Precautions Weight Bearing; Fall Risk   Required Braces or Orthoses? Yes   Implants present? Pacemaker   Lower Extremity Weight Bearing Restrictions   Right Lower Extremity Weight Bearing Weight Bearing As Tolerated   Left Lower Extremity Weight Bearing Weight Bearing As Tolerated   Required Braces or Orthoses   Spinal Thoracic Lumbar Sacral Orthotics   Position Activity Restriction   Spinal Precautions No Bending; No Lifting; No Twisting     Discharge Recommendations:  Continue to assess pending progress       Assessment   Performance deficits / Impairments: Decreased functional mobility ; Decreased ADL status; Decreased strength;Decreased high-level IADLs;Decreased balance;Decreased endurance;Decreased cognition  KYPHON Balloon Kyphoplasty at T12 on 10/26/2021, R hip wound,MSSA Bactermia/status post myocutaneous and fasciocutaneous flap advancements for coverage of a chronic right hip wound/s/p I&D of right hip wound/s/p kyphoplasty  Assessment: Pt requires increased assistance with ADLs due to impaired sitting balance, decreased strength pain in RLE and back. She will require 2 person assistance due to safety with sitting balance and transfers. She required Max A with supine>sit and then required CGA at first, but pt's feet were unable to reach, pt became weaker and weaker requiring Mod A for sitting balance. Recommend stool for patient to rest on floor for sitting balance. Did not transfer patient on the evaluation. She requires skilled OT to address the above deficits and return the pt home.   Prognosis: Good  Decision Making: Low Complexity  OT Education: OT Role;Plan of Care;ADL Adaptive Strategies;Transfer Training  Activity Tolerance  Activity Tolerance: Patient Tolerated treatment well  Safety Devices  Safety Devices in place: Yes  Type of devices: Bed alarm in place;Call light within reach         Patient Diagnosis(es): There were no encounter diagnoses. has a past medical history of CAD (coronary artery disease), Cellulitis, History of blood transfusion, History of DVT (deep vein thrombosis), History of neutropenia, History of pulmonary fibrosis, Hx of blood clots, Hypercholesterolemia, Hypertension, Intrinsic asthma, Pacemaker, Post op infection, Rheumatoid arteritis ([address] Sinus node dysfunction ([address] Staph aureus infection, Status post placement of implantable loop recorder, Syncope, and Thyroid disease. has a past surgical history that includes Diagnostic Cardiac Cath Lab Procedure; LEEP; Coronary angioplasty with stent (2014); Pacemaker insertion (11/17/2016); back surgery (01/01/2018); pr total knee arthroplasty (Right, 9/11/2018); pr incis/drain thigh/knee abscess,deep (Right, 10/23/2018); Cardiac catheterization (5/14/14  MDL); Cardiac catheterization (1/26/15  MDL); pacemaker placement; joint replacement; and Revision total knee arthroplasty (Right, 12/6/2018). Restrictions  Restrictions/Precautions  Restrictions/Precautions: Weight Bearing, Fall Risk  Required Braces or Orthoses?: Yes  Lower Extremity Weight Bearing Restrictions  Right Lower Extremity Weight Bearing: Weight Bearing As Tolerated  Left Lower Extremity Weight Bearing: Weight Bearing As Tolerated  Required Braces or Orthoses  Spinal: Thoracic Lumbar Sacral Orthotics  Position Activity Restriction  Spinal Precautions: No Bending, No Lifting, No Twisting         Orientation  Orientation  Overall Orientation Status: Impaired  Orientation Level: Oriented to person;Disoriented to time (unable to state month or day of the week, but able to state the year after increased time.)  Objective    Balance  Sitting Balance:  Moderate assistance  Standing Balance: Unable to assess(comment)  Bed mobility  Supine to Sit: Maximum assistance  Transfers  Sit to stand: Unable to assess  Stand to sit: Unable to assess     Cognition  Overall Cognitive Status: Exceptions  Arousal/Alertness: Appropriate responses to stimuli  Following Commands:  Follows one step commands with increased time  Memory: Decreased recall of precautions;Decreased short term memory  Problem Solving: Assistance required to generate solutions;Assistance required to implement solutions  Insights: Decreased awareness of deficits  Initiation: Does not require cues  Sequencing: Does not require cues        LUE AROM (degrees)  LUE AROM : WFL  Left Hand AROM (degrees)  Left Hand AROM: WFL  RUE AROM (degrees)  RUE AROM : WFL  Right Hand AROM (degrees)  Right Hand AROM: WFL                 Plan   Plan  Current Treatment Recommendations: Strengthening, Balance Training, Functional Mobility Training, Endurance Training, Safety Education & Training, Patient/Caregiver Education & Training, Equipment Evaluation, Education, & procurement, Self-Care / ADL, Home Management Training       OutComes Score    Eating  Assistance Needed: Setup or clean-up assistance  CARE Score: 5  Discharge Goal: Independent    Oral Hygiene  Assistance Needed: Setup or clean-up assistance  CARE Score: 5  Discharge Goal: Independent     Toileting Hygiene  Assistance Needed: Dependent  CARE Score: 1  Discharge Goal: Partial/moderate assistance     Shower/Bathe Self  Assistance Needed: Substantial/maximal assistance  CARE Score: 2  Discharge Goal: Partial/moderate assistance     Upper Body Dressing  Assistance Needed: Substantial/maximal assistance  CARE Score: 2  Discharge Goal: Partial/moderate assistance     Lower Body Dressing  Assistance Needed: Dependent  CARE Score: 1  Discharge Goal: Supervision or touching assistance     Putting On/Taking Off Footwear  Assistance Needed: Dependent  CARE Score: 1  Discharge Goal: Partial/moderate assistance     Toilet Transfer  Reason if not Attempted: Not attempted due to medical condition or safety concerns  CARE Score: 88  Discharge Goal: Partial/moderate assistance     Picking Up Object  Reason if not Attempted: Not attempted due to medical condition or safety concerns  CARE Score: 88  Discharge Goal: Supervision or touching assistance       Goals  Short term goals  Time Frame for Short term goals: 1 week  Short term goal 1: Max A with clothing management/hygiene for toileting. Short term goal 2: Max A with toilet transfers. Short term goal 3: Max A with bathing hygiene. Short term goal 4: Max A with LB dressing using AE. Short term goal 5: Min A with UB dressing. Short term goal 6: Pt will set at EOB to complete self care tasks, requiring supervision with sitting balance. Long term goals  Time Frame for Long term goals : 2 week  Long term goal 1: Mod A with clothing management/hygiene for toileting. Long term goal 2: Mod A with toilet transfers. Long term goal 3: Mod A with bathing hygiene. Long term goal 4: Mod A with LB dressing using AE. Long term goal 5: Pt will verbalize DME needs. Long term goals 6: Supervision with UB dressing. Patient Goals   Patient goals : Return home independently.        Therapy Time   Individual Concurrent Group Co-treatment   Time In 1000         Time Out 1100         Minutes 60         Timed Code Treatment Minutes: [address], OT

## 2021-10-29 NOTE — PROGRESS NOTES
Occupational Therapy  Facility/Department: [facility] 8 REHAB UNIT  Daily Treatment Note  NAME: Delia Zaldivar  : 1955  MRN: [id number]    Date of Service: 10/29/2021    Discharge Recommendations:  Continue to assess pending progress       Assessment   Performance deficits / Impairments: Decreased functional mobility ; Decreased ADL status; Decreased strength;Decreased high-level IADLs;Decreased balance;Decreased endurance;Decreased cognition  Activity Tolerance  Activity Tolerance: Patient limited by pain  Safety Devices  Safety Devices in place: Yes  Type of devices: Bed alarm in place;Call light within reach         Patient Diagnosis(es): There were no encounter diagnoses. has a past medical history of CAD (coronary artery disease), Cellulitis, History of blood transfusion, History of DVT (deep vein thrombosis), History of neutropenia, History of pulmonary fibrosis, Hx of blood clots, Hypercholesterolemia, Hypertension, Intrinsic asthma, Pacemaker, Post op infection, Rheumatoid arteritis ([address] Sinus node dysfunction ([address] Staph aureus infection, Status post placement of implantable loop recorder, Syncope, and Thyroid disease. has a past surgical history that includes Diagnostic Cardiac Cath Lab Procedure; LEEP; Coronary angioplasty with stent (); Pacemaker insertion (2016); back surgery (2018); pr total knee arthroplasty (Right, 2018); pr incis/drain thigh/knee abscess,deep (Right, 10/23/2018); Cardiac catheterization (14  MDL); Cardiac catheterization (1/26/15  MDL); pacemaker placement; joint replacement; and Revision total knee arthroplasty (Right, 2018).     Restrictions  Restrictions/Precautions  Restrictions/Precautions: Weight Bearing, Fall Risk  Required Braces or Orthoses?: Yes  Lower Extremity Weight Bearing Restrictions  Right Lower Extremity Weight Bearing: Weight Bearing As Tolerated  Left Lower Extremity Weight Bearing: Weight Bearing As Tolerated  Required Braces

## 2021-10-30 ENCOUNTER — APPOINTMENT (OUTPATIENT)
Dept: GENERAL RADIOLOGY | Age: 66
DRG: 871 | End: 2021-10-30
Attending: PSYCHIATRY & NEUROLOGY
Payer: MEDICARE

## 2021-10-30 PROCEDURE — 1180000000 HC REHAB R&B

## 2021-10-30 PROCEDURE — 97530 THERAPEUTIC ACTIVITIES: CPT

## 2021-10-30 PROCEDURE — 99233 SBSQ HOSP IP/OBS HIGH 50: CPT | Performed by: PSYCHIATRY & NEUROLOGY

## 2021-10-30 PROCEDURE — 6370000000 HC RX 637 (ALT 250 FOR IP): Performed by: PSYCHIATRY & NEUROLOGY

## 2021-10-30 PROCEDURE — 2580000003 HC RX 258: Performed by: PSYCHIATRY & NEUROLOGY

## 2021-10-30 PROCEDURE — 6360000002 HC RX W HCPCS: Performed by: PSYCHIATRY & NEUROLOGY

## 2021-10-30 PROCEDURE — 97535 SELF CARE MNGMENT TRAINING: CPT

## 2021-10-30 PROCEDURE — 97110 THERAPEUTIC EXERCISES: CPT

## 2021-10-30 PROCEDURE — 73521 X-RAY EXAM HIPS BI 2 VIEWS: CPT

## 2021-10-30 RX ADMIN — RIVAROXABAN 20 MG: 20 TABLET, FILM COATED ORAL at 07:58

## 2021-10-30 RX ADMIN — CARVEDILOL 25 MG: 25 TABLET, FILM COATED ORAL at 17:30

## 2021-10-30 RX ADMIN — THIAMINE HCL TAB 100 MG 250 MG: 100 TAB at 07:59

## 2021-10-30 RX ADMIN — OXYCODONE 10 MG: 5 TABLET ORAL at 14:09

## 2021-10-30 RX ADMIN — NALOXEGOL OXALATE 25 MG: 12.5 TABLET, FILM COATED ORAL at 07:58

## 2021-10-30 RX ADMIN — PANTOPRAZOLE SODIUM 40 MG: 40 TABLET, DELAYED RELEASE ORAL at 08:00

## 2021-10-30 RX ADMIN — CEFAZOLIN SODIUM 2000 MG: 2 INJECTION, SOLUTION INTRAVENOUS at 20:04

## 2021-10-30 RX ADMIN — ASPIRIN 81 MG: 81 TABLET, COATED ORAL at 08:00

## 2021-10-30 RX ADMIN — Medication 1 CAPSULE: at 07:58

## 2021-10-30 RX ADMIN — SUCRALFATE 1 G: 1 TABLET ORAL at 13:07

## 2021-10-30 RX ADMIN — CEFAZOLIN SODIUM 2000 MG: 2 INJECTION, SOLUTION INTRAVENOUS at 05:34

## 2021-10-30 RX ADMIN — POTASSIUM CHLORIDE 20 MEQ: 1500 TABLET, EXTENDED RELEASE ORAL at 07:58

## 2021-10-30 RX ADMIN — ISOSORBIDE MONONITRATE 60 MG: 60 TABLET, EXTENDED RELEASE ORAL at 07:58

## 2021-10-30 RX ADMIN — OXYCODONE 10 MG: 5 TABLET ORAL at 20:27

## 2021-10-30 RX ADMIN — TRAMADOL HYDROCHLORIDE 50 MG: 50 TABLET, COATED ORAL at 14:09

## 2021-10-30 RX ADMIN — FERROUS SULFATE TAB 325 MG (65 MG ELEMENTAL FE) 325 MG: 325 (65 FE) TAB at 08:00

## 2021-10-30 RX ADMIN — ISOSORBIDE MONONITRATE 60 MG: 60 TABLET, EXTENDED RELEASE ORAL at 20:05

## 2021-10-30 RX ADMIN — MAGNESIUM OXIDE TAB 400 MG (240 MG ELEMENTAL MG) 400 MG: 400 (240 MG) TAB at 07:58

## 2021-10-30 RX ADMIN — SUCRALFATE 1 G: 1 TABLET ORAL at 07:59

## 2021-10-30 RX ADMIN — SUCRALFATE 1 G: 1 TABLET ORAL at 20:05

## 2021-10-30 RX ADMIN — Medication 1 TABLET: at 08:00

## 2021-10-30 RX ADMIN — TRAMADOL HYDROCHLORIDE 50 MG: 50 TABLET, COATED ORAL at 20:05

## 2021-10-30 RX ADMIN — LISINOPRIL 10 MG: 10 TABLET ORAL at 08:00

## 2021-10-30 RX ADMIN — OXYCODONE 10 MG: 5 TABLET ORAL at 10:18

## 2021-10-30 RX ADMIN — LEVOTHYROXINE SODIUM 75 MCG: 75 TABLET ORAL at 05:34

## 2021-10-30 RX ADMIN — CEFAZOLIN SODIUM 2000 MG: 2 INJECTION, SOLUTION INTRAVENOUS at 14:09

## 2021-10-30 RX ADMIN — CARVEDILOL 25 MG: 25 TABLET, FILM COATED ORAL at 07:59

## 2021-10-30 RX ADMIN — FOLIC ACID 1 MG: 1 TABLET ORAL at 08:00

## 2021-10-30 RX ADMIN — POTASSIUM CHLORIDE 20 MEQ: 1500 TABLET, EXTENDED RELEASE ORAL at 20:05

## 2021-10-30 RX ADMIN — VALACYCLOVIR HYDROCHLORIDE 500 MG: 500 TABLET, FILM COATED ORAL at 07:58

## 2021-10-30 RX ADMIN — DOCUSATE SODIUM 100 MG: 100 CAPSULE ORAL at 07:59

## 2021-10-30 RX ADMIN — FUROSEMIDE 40 MG: 40 TABLET ORAL at 07:59

## 2021-10-30 RX ADMIN — SODIUM CHLORIDE, PRESERVATIVE FREE 10 ML: 5 INJECTION INTRAVENOUS at 20:05

## 2021-10-30 RX ADMIN — SODIUM CHLORIDE, PRESERVATIVE FREE 10 ML: 5 INJECTION INTRAVENOUS at 14:09

## 2021-10-30 RX ADMIN — TRAMADOL HYDROCHLORIDE 50 MG: 50 TABLET, COATED ORAL at 08:00

## 2021-10-30 RX ADMIN — SUCRALFATE 1 G: 1 TABLET ORAL at 21:59

## 2021-10-30 ASSESSMENT — PAIN DESCRIPTION - PAIN TYPE
TYPE: SURGICAL PAIN
TYPE: ACUTE PAIN
TYPE: ACUTE PAIN
TYPE: SURGICAL PAIN
TYPE: ACUTE PAIN
TYPE: ACUTE PAIN
TYPE: ACUTE PAIN;SURGICAL PAIN

## 2021-10-30 ASSESSMENT — PAIN DESCRIPTION - PROGRESSION
CLINICAL_PROGRESSION: GRADUALLY WORSENING
CLINICAL_PROGRESSION: NOT CHANGED
CLINICAL_PROGRESSION: GRADUALLY WORSENING

## 2021-10-30 ASSESSMENT — PAIN DESCRIPTION - ORIENTATION
ORIENTATION: RIGHT;LEFT
ORIENTATION: RIGHT;ANTERIOR
ORIENTATION: RIGHT;LEFT
ORIENTATION: RIGHT

## 2021-10-30 ASSESSMENT — PAIN DESCRIPTION - LOCATION
LOCATION: GROIN
LOCATION: GROIN
LOCATION: BACK;HIP;GROIN
LOCATION: GROIN
LOCATION: HIP;BACK;GROIN
LOCATION: BACK;GROIN;HIP
LOCATION: HIP;GROIN

## 2021-10-30 ASSESSMENT — PAIN DESCRIPTION - FREQUENCY
FREQUENCY: CONTINUOUS

## 2021-10-30 ASSESSMENT — PAIN DESCRIPTION - ONSET
ONSET: ON-GOING
ONSET: PROGRESSIVE
ONSET: ON-GOING
ONSET: PROGRESSIVE

## 2021-10-30 ASSESSMENT — PAIN DESCRIPTION - DESCRIPTORS
DESCRIPTORS: DISCOMFORT
DESCRIPTORS: ACHING;DISCOMFORT
DESCRIPTORS: ACHING
DESCRIPTORS: SHARP
DESCRIPTORS: ACHING;SHARP
DESCRIPTORS: SHARP;STABBING

## 2021-10-30 ASSESSMENT — PAIN SCALES - GENERAL
PAINLEVEL_OUTOF10: 0
PAINLEVEL_OUTOF10: 8
PAINLEVEL_OUTOF10: 0
PAINLEVEL_OUTOF10: 9
PAINLEVEL_OUTOF10: 8
PAINLEVEL_OUTOF10: 5
PAINLEVEL_OUTOF10: 7
PAINLEVEL_OUTOF10: 8
PAINLEVEL_OUTOF10: 6
PAINLEVEL_OUTOF10: 8

## 2021-10-30 ASSESSMENT — PAIN - FUNCTIONAL ASSESSMENT
PAIN_FUNCTIONAL_ASSESSMENT: PREVENTS OR INTERFERES SOME ACTIVE ACTIVITIES AND ADLS
PAIN_FUNCTIONAL_ASSESSMENT: PREVENTS OR INTERFERES WITH ALL ACTIVE AND SOME PASSIVE ACTIVITIES
PAIN_FUNCTIONAL_ASSESSMENT: PREVENTS OR INTERFERES WITH ALL ACTIVE AND SOME PASSIVE ACTIVITIES

## 2021-10-30 NOTE — PROGRESS NOTES
10/30/21 1100   Pain Screening   Patient Currently in Pain Yes   Pain Assessment   Pain Assessment 0-10   Pain Level 8   Pain Type Acute pain   Pain Location Hip;Groin   Pain Orientation Right; Anterior   Pain Descriptors Aching;Discomfort   Pain Frequency Continuous   Pain Onset On-going   Clinical Progression Not changed   Functional Pain Assessment Prevents or interferes with all active and some passive activities   Non-Pharmaceutical Pain Intervention(s) Repositioned;Distraction   Response to Pain Intervention Patient Satisfied   Bed Mobility   Rolling Maximal assistance; Moderate assistance  (x2)   Supine to Sit Maximal assistance; Moderate assistance  (x2 log roll to left)   Transfers   Bed to Chair Maximum assistance; Moderate assistance  (x2 sliding board)   Comment SLIDING BOARD TO LEFT AND RIGHT. LITTLE TO NO INITIATION ON PART OF PATIETN   Conditions Requiring Skilled Therapeutic Intervention   Body structures, Functions, Activity limitations Decreased functional mobility ; Decreased ROM; Decreased strength;Decreased endurance;Decreased balance;Decreased coordination; Increased pain;Decreased posture   Assessment PATIENT RELUCTANTLY PARTICIPATED. LACK OF ANTERIOR LEAN FOR SLIDING BOARD. POOR SITTING BALANCE EDGE OF BED.

## 2021-10-30 NOTE — PROGRESS NOTES
10/30/21 0895   Restrictions/Precautions   Restrictions/Precautions Weight Bearing; Fall Risk   Required Braces or Orthoses? Yes   Implants present? Pacemaker   Lower Extremity Weight Bearing Restrictions   Right Lower Extremity Weight Bearing Weight Bearing As Tolerated   Left Lower Extremity Weight Bearing Weight Bearing As Tolerated   Required Braces or Orthoses   Spinal Thoracic Lumbar Sacral Orthotics   Position Activity Restriction   Spinal Precautions No Bending; No Lifting; No Twisting   Pain Screening   Patient Currently in Pain Yes  (nsg came into room during tx to check on meds)   Pain Assessment   Pain Assessment 0-10   Pain Level 8   Pain Type Acute pain;Surgical pain   Pain Location Hip;Back;Groin   Pain Orientation Right   Pain Descriptors Discomfort   Pain Frequency Continuous  (worsened by movement)   Functional Pain Assessment Prevents or interferes with all active and some passive activities   Non-Pharmaceutical Pain Intervention(s) Repositioned; Ambulation/Increased Activity; Distraction   Response to Pain Intervention None   Bed Mobility   Sit to Supine Maximal assistance  (x2; ONE THERAPIST ON TRUNK, ONE ON LES. FROM R)   Transfers   Bed to Chair Maximum assistance  (X2 WITH SLIDING BOARD TO RIGHT)   Comment LACK OF ANTERIOR LEAN, LACK OF FORCE TRHOUGH LES, FORCE THROUGH UES   Conditions Requiring Skilled Therapeutic Intervention   Body structures, Functions, Activity limitations Decreased functional mobility ; Decreased ROM; Decreased strength;Decreased endurance;Decreased balance;Decreased coordination; Increased pain;Decreased posture   Assessment TRANSFERED FROM DROP ARM RECLINER TO WHEELCHAIR, WHEELCIAR TO BED. MAX X2 FOR POSITIONING IN WHEELCHAIR, PUSHING BUTTOCKS BACK ONTO BED.

## 2021-10-30 NOTE — PROGRESS NOTES
Facility/Department: [facility] 8 REHAB UNIT  Daily Treatment Note  NAME: Katarina Lao  : 1955  MRN: [id number]    Date of Service: 10/30/2021    Discharge Recommendations:  Continue to assess pending progress     Assessment   Performance deficits / Impairments: Decreased functional mobility ; Decreased ADL status; Decreased strength;Decreased high-level IADLs;Decreased balance;Decreased endurance;Decreased cognition  Assessment: OT tx completed. Pt continues to display functional deficits that indicate need for further treatment. Tx session spent on transfers; max x 2 required. Pt stated pain was at her limit after return to bed, causing tx session to be ended. Treatment Diagnosis: KYPHON Balloon Kyphoplasty at T12 on 10/26/2021, R hip wound,MSSA Bactermia/status post myocutaneous and fasciocutaneous flap advancements for coverage of a chronic right hip wound/s/p I&D of right hip wound/s/p kyphoplasty  Prognosis: Good  OT Education: ADL Adaptive Strategies;Transfer Training;Precautions  Patient Education: Pt. demonstrated difficulty comprehending back precautions. Activity Tolerance  Activity Tolerance: Patient limited by pain  Safety Devices  Safety Devices in place: Yes  Type of devices: Call light within reach; Bed alarm in place; Left in bed (Nsg in room upon return to bed.)         Patient Diagnosis(es): There were no encounter diagnoses. has a past medical history of CAD (coronary artery disease), Cellulitis, History of blood transfusion, History of DVT (deep vein thrombosis), History of neutropenia, History of pulmonary fibrosis, Hx of blood clots, Hypercholesterolemia, Hypertension, Intrinsic asthma, Pacemaker, Post op infection, Rheumatoid arteritis ([address] Sinus node dysfunction ([address] Staph aureus infection, Status post placement of implantable loop recorder, Syncope, and Thyroid disease.    has a past surgical history that includes Diagnostic Cardiac Cath Lab Procedure; LEEP; Coronary angioplasty with stent (2014); Pacemaker insertion (11/17/2016); back surgery (01/01/2018); pr total knee arthroplasty (Right, 9/11/2018); pr incis/drain thigh/knee abscess,deep (Right, 10/23/2018); Cardiac catheterization (5/14/14  MDL); Cardiac catheterization (1/26/15  MDL); pacemaker placement; joint replacement; and Revision total knee arthroplasty (Right, 12/6/2018). Restrictions  Restrictions/Precautions  Restrictions/Precautions: Weight Bearing, Fall Risk  Required Braces or Orthoses?: Yes  Implants present? : Pacemaker  Lower Extremity Weight Bearing Restrictions  Right Lower Extremity Weight Bearing: Weight Bearing As Tolerated  Left Lower Extremity Weight Bearing: Weight Bearing As Tolerated  Required Braces or Orthoses  Spinal: Thoracic Lumbar Sacral Orthotics  Position Activity Restriction  Spinal Precautions: No Bending, No Lifting, No Twisting    Subjective   General  Patient assessed for rehabilitation services?: Yes  Pain Assessment  Pain Assessment: 0-10  Pain Level: 8  Pain Type: Acute pain;Surgical pain  Pain Location: Hip;Back;Groin  Pain Orientation: Right  Pain Descriptors: Discomfort  Pain Frequency: Continuous (worsened by movement)  Functional Pain Assessment: Prevents or interferes with all active and some passive activities  Non-Pharmaceutical Pain Intervention(s): Repositioned; Ambulation/Increased Activity; Distraction  Response to Pain Intervention: None  Vital Signs  Patient Currently in Pain: Yes (nsg came into room during tx to check on meds)     Objective    Bed mobility  Rolling to Left: Maximum assistance;2 Person assistance  Comment: \"Log-rolled\" to adhere to back precautions. Pt did experience difficulty and required both vc and physical assist to preserve surgical integrity.   Transfers  Slide Board: Maximum assistance;2 Person assistance (placed board under feet to provide leverage)  Transfer Comments: Drop-arm recliner > w/c, then w/c to bed     Plan   Plan  Current Treatment Recommendations: Strengthening, Balance Training, Functional Mobility Training, Endurance Training, Safety Education & Training, Patient/Caregiver Education & Training, Equipment Evaluation, Education, & procurement, Self-Care / ADL, Home Management Training    Goals (On-Going)   Short term goals  Time Frame for Short term goals: 1 week  Short term goal 1: Max A with clothing management/hygiene for toileting. Short term goal 2: Max A with toilet transfers. Short term goal 3: Max A with bathing hygiene. Short term goal 4: Max A with LB dressing using AE. Short term goal 5: Min A with UB dressing. Short term goal 6: Pt will set at EOB to complete self care tasks, requiring supervision with sitting balance. Long term goals  Time Frame for Long term goals : 2 week  Long term goal 1: Mod A with clothing management/hygiene for toileting. Long term goal 2: Mod A with toilet transfers. Long term goal 3: Mod A with bathing hygiene. Long term goal 4: Mod A with LB dressing using AE. Long term goal 5: Pt will verbalize DME needs. Long term goals 6: Supervision with UB dressing. Patient Goals   Patient goals : Return home independently.         10/30/21 1345   OT Co-Treatment Minutes   Time In 1345  (Co-tx with PT)   Time Out 1410   Minutes 25   Time Code Minutes    Timed Code Treatment Minutes 25 Minutes     Timed Code Treatment Minutes: [address] DOREEN Diaz/DEONDRE  Electronically signed by Lucy LOGAN/L on 10/30/2021 at 3:35 PM.

## 2021-10-30 NOTE — PROGRESS NOTES
Patient:   Hanny Quintanilla  MR#:    [id number]   Room:    0820/820-02   YOB: 1955  Date of Progress Note: 10/30/2021  Time of Note                           10:43 AM  Consulting Physician:   Susan Bain M.D. Attending Physician:  Susan Bain MD     Chief complaint MSSA Bactermia/status post myocutaneous and fasciocutaneous flap advancements for coverage of a chronic right hip wound/s/p I&D of right hip wound/s/p kyphoplasty     S:This 74 y.o. female admitted to [facility] on 10/21/2021 after suffering a fall off of her bed at home on 10/18/2021. She presented to the ED on the 10/21 with complaint of back pain. Imaging revealed a T12 compression. Initially plan was to treat patient conservatively with TLSO brace. Subsequent workup identified staph bacteremia, confirmed by 2 of 2 peripheral blood cultures, and a UTI with 4+ bacteria. Out of concern that the UTI might not represent the primary source of the bacteremia, additional workup was appropriately performed. CT imaging of the right hip subsequently identified a fluid collection and soft tissue induration, which triggered concern for an abscess. Mrs. Kian Roberson is 5 weeks and 3 days status post myocutaneous and fasciocutaneous flap advancements for coverage of a chronic right hip wound. Incidentally, tissue cultures were sent at the time of her coverage procedure, and these resulted in no growth. Understanding the patient's prior surgical history, the findings on CT are not unexpected. In other words, a deep space fluid collection and tissue induration 5 weeks after the procedure described above is not at all surprising, and in isolation, would not be a cause for concern. Given the circumstances of bacteremia; however, ruling out abscess or infected seroma pocket is necessary. Even if the fluid collection does not represent the etiology of the bacteremia, there is concern that it could have potentially become seeded. Examination of the right hip was reassuring. There was no cellulitis or warmth. The surgical incision was well healed. The patient reports that she has been ambulating without difficulty. She has no tenderness to palpation over the fluid collection. Although it is essential to rule out infection at this site, it is premature to take the patient to the OR and re-create a wound that she previously struggled to manage for 7 months. A very reasonable course of action would be to attempt to aspirate the fluid collection under fluoroscopic guidance. If the contents are frankly purulent or if the fluid subsequently resulted in growth on culture, then  perform an I&D. If the aspiration reveals seroma and no growth on culture, then there is an opportunity to spare  patient re-creation of a previously very difficult wound. On 10/24,  CT imaging indicated a small fluid collection at the right hip at the location of her previous wound. This was aspirated and Gram stain showed presence of gram-positive cocci. Decision was made to proceed with I&D. Patient tolerated procedure well. Infectious disease was consulted for antibiotic management. Regarding her T12 compression fx, she has been unresponsive to nonoperative treatment modalities including analgesics including opioids, lidocaine patch, nonsteroidal anti-inflammatories, and muscle relaxants as well as bracing with TLSO. These fractures were sustained with minimal trauma and she has low bone density consistent with osteoporosis. She has been unresponsive to nonoperative treatment modalities including analgesics including opioids, lidocaine patch, nonsteroidal anti-inflammatories, and muscle relaxants as well as bracing with TLSO. These fractures were sustained with minimal trauma and he has low bone density consistent with osteoporosis. Based on these findings, decision was made to perform [facility] Balloon Kyphoplasty at T12 on 10/26/2021 by Dr Delonte Mckeon.  She tolerated procedure well. She is now medically stable and is participating with therapy. She is ready to begin rehab with plan of returning home after rehab dc with support from her 24/7 caregiver and family. Chronic pain right hip. REVIEW OF SYSTEMS:  Constitutional: No fevers No chills  Neck:No stiffness  Respiratory: No shortness of breath  Cardiovascular: No chest pain No palpitations  Gastrointestinal: No abdominal pain    Genitourinary: No Dysuria  Neurological: No headache, no confusion    Past Medical History:      Diagnosis Date    CAD (coronary artery disease)     S/p Stent to right coronary artery 9/2010. S/p myocardial infarction May 2013.  Cellulitis     LT LEG IN PAST    History of blood transfusion     History of DVT (deep vein thrombosis)     LT    History of neutropenia     History of pulmonary fibrosis     Hx of blood clots 1992    LT LEG    Hypercholesterolemia     Hypertension     Intrinsic asthma     Pacemaker 11/17/2016    Post op infection     \"right knee leaking after my replacement\"    Rheumatoid arteritis ([address]     Sinus node dysfunction ([address]     Staph aureus infection     Status post placement of implantable loop recorder 2/13/15, 3/30/15    2/19/15  removed, infection    Syncope 2/11/2015    Thyroid disease     HYPOTHYROIDISM       Past Surgical History:      Procedure Laterality Date    BACK SURGERY  01/01/2018    CARDIAC CATHETERIZATION  5/14/14  MDL    normal LV function. EF 60%    CARDIAC CATHETERIZATION  1/26/15  [facility]DL    EF 60%, Left circ has long 80-90% in small 2mm vessel, diffuse disease in RCA    CORONARY ANGIOPLASTY WITH STENT PLACEMENT  2014    DIAGNOSTIC CARDIAC CATH LAB PROCEDURE      Right coronary artery stent.  9/2010    JOINT REPLACEMENT      LEEP      PACEMAKER INSERTION  11/17/2016    Dr Lisa Vázquez; [employer]    PACEMAKER PLACEMENT      [employer]    NM INCIS/DRAIN THIGH/KNEE ABSCESS,DEEP Right 10/23/2018    KNEE INCISION AND DRAINAGE performed by Werner Vargas MD at [address] [facility] Right 9/11/2018    KNEE TOTAL ARTHROPLASTY performed by Werner Vargas MD at [place] [address] Right 12/6/2018    KNEE EXPLANT AND PLACEMENT OF ANTIBIOTIC SPACER performed by Werner Vargas MD at [facility] OR       Medications in Hospital:      Current Facility-Administered Medications:     oxyCODONE (ROXICODONE) immediate release tablet 10 mg, 10 mg, Oral, Q4H PRN, Gayathri Baxter MD, 10 mg at 10/30/21 1018    cloNIDine (CATAPRES) tablet 0.1 mg, 0.1 mg, Oral, Q4H PRN, Gayathri Baxter MD    lisinopril (PRINIVIL;ZESTRIL) tablet 10 mg, 10 mg, Oral, Daily, Gayathri Baxter MD, 10 mg at 10/30/21 0800    traMADol (ULTRAM) tablet 50 mg, 50 mg, Oral, TID, Gayathri Baxter MD, 50 mg at 10/30/21 0800    acetaminophen (TYLENOL) tablet 650 mg, 650 mg, Oral, Q4H PRN, Gayathri Baxter MD    aspirin EC tablet 81 mg, 81 mg, Oral, Daily, Gayathri Baxter MD, 81 mg at 10/30/21 0800    carvedilol (COREG) tablet 25 mg, 25 mg, Oral, BID WC, Gayathri Baxter MD, 25 mg at 10/30/21 0759    docusate sodium (COLACE) capsule 100 mg, 100 mg, Oral, Daily, Gayathri Baxter MD, 100 mg at 10/30/21 0759    ferrous sulfate (IRON 325) tablet 325 mg, 325 mg, Oral, Daily with breakfast, Gayathri Baxter MD, 325 mg at 10/30/21 0800    fluticasone (FLONASE) 50 MCG/ACT nasal spray 1 spray, 1 spray, Each Nostril, Daily PRN, Gayathri Baxter MD    folic acid (FOLVITE) tablet 1 mg, 1 mg, Oral, Daily, Gayathri Baxter MD, 1 mg at 10/30/21 0800    furosemide (LASIX) tablet 40 mg, 40 mg, Oral, Daily, Gayathri Baxter MD, 40 mg at 10/30/21 0759    isosorbide mononitrate (IMDUR) extended release tablet 60 mg, 60 mg, Oral, BID, Gayathri Baxter MD, 60 mg at 10/30/21 0758    levothyroxine (SYNTHROID) tablet 75 mcg, 75 mcg, Oral, Daily, Gayathri Baxter MD, 75 mcg at 10/30/21 0534    lidocaine 4 % external patch 1 patch, 1 patch, TransDERmal, Daily, Maria Guadalupe Smith MD, 1 patch at 10/30/21 0758    magnesium oxide (MAG-OX) tablet 400 mg, 400 mg, Oral, Daily, Maria Guadalupe Smith MD, 400 mg at 10/30/21 0758    therapeutic multivitamin-minerals 1 tablet, 1 tablet, Oral, Daily, Maria Guadalupe Smith MD, 1 tablet at 10/30/21 0800    naloxegol (MOVANTIK) tablet 25 mg, 25 mg, Oral, QAM, Maria Guadalupe Smith MD, 25 mg at 10/30/21 0758    nitroGLYCERIN (NITROSTAT) SL tablet 0.4 mg, 0.4 mg, SubLINGual, Q5 Min PRN, Maria Guadalupe Smith MD    pantoprazole (PROTONIX) tablet 40 mg, 40 mg, Oral, Daily, Maria Guadalupe Smith MD, 40 mg at 10/30/21 0800    potassium chloride (KLOR-CON M) extended release tablet 20 mEq, 20 mEq, Oral, BID, Maria Guadalupe Smith MD, 20 mEq at 10/30/21 0758    rivaroxaban (XARELTO) tablet 20 mg, 20 mg, Oral, Daily, Maria Guadalupe Smith MD, 20 mg at 10/30/21 0758    salsalate (DISALCID) tablet 500 mg, 500 mg, Oral, Daily, Maria Guadalupe Smith MD    sucralfate (CARAFATE) tablet 1 g, 1 g, Oral, 4x Daily PC & HS, Maria Guadalupe Smith MD, 1 g at 10/30/21 0759    valACYclovir (VALTREX) tablet 500 mg, 500 mg, Oral, Daily, Maria Guadalupe Smith MD, 500 mg at 10/30/21 0758    thiamine mononitrate tablet 250 mg, 250 mg, Oral, Daily, Maria Guadalupe Smith MD, 250 mg at 10/30/21 0759    acetaminophen (TYLENOL) tablet 650 mg, 650 mg, Oral, Q4H PRN, Maria Guadalupe Smith MD    polyethylene glycol (GLYCOLAX) packet 17 g, 17 g, Oral, Daily PRN, Maria Guadalupe Smith MD    ceFAZolin (ANCEF) 2000 mg in dextrose 4 % 100 mL IVPB (premix), 2,000 mg, IntraVENous, Q8H, Maria Guadalupe Smith MD, Stopped at 10/30/21 0655    lactobacillus (CULTURELLE) capsule 1 capsule, 1 capsule, Oral, Daily, Maria Guadalupe Smith MD, 1 capsule at 10/30/21 0758    sodium chloride flush 0.9 % injection 5-40 mL, 5-40 mL, IntraVENous, BID, Maria Guadalupe Smith MD, 10 mL at 10/29/21 2011    sodium chloride flush 0.9 % injection 5-40 mL, 5-40 mL, IntraVENous, PRN, Maria Guadalupe Smith MD, 10 mL at 10/29/21 1349    Allergies:  Amoxicillin, Lipitor, Niaspan [niacin er], Penicillins, Relafen [nabumetone], and Zocor [simvastatin]    Social History:   TOBACCO:   reports that she has never smoked. She has never used smokeless tobacco.  ETOH:   reports no history of alcohol use. Family History:       Problem Relation Age of Onset    Cancer Mother         LUNG CA    Heart Attack Father 39        FATAL MI         PHYSICAL EXAM:  /65   Pulse 76   Temp 96.6 °F (35.9 °C) (Temporal)   Resp 18   Ht 5' 6\" (1.676 m)   Wt 164 lb 6.4 oz (74.6 kg)   SpO2 94%   BMI 26.53 kg/m²       Constitutional - well developed, well nourished. Eyes - conjunctiva normal.   Ear, nose, throat - No scars, masses, or lesions over external nose or ears, no atrophy of tongue  Neck-symmetric, no masses noted, no jugular vein distension  Respiration- chest wall appears symmetric, good expansion,   normal effort without use of accessory muscles  Musculoskeletal - no significant wasting of muscles noted, no bony deformities  Extremities-no clubbing, cyanosis or edema  Skin - warm, dry, and intact. No rash, erythema, or pallor. Psychiatric - mood, affect, and behavior appear normal.      Neurological exam  Awake, alert, fluent oriented appropriate affect  Attention and concentration appear appropriate  Recent and remote memory appears unremarkable  Speech normal without dysarthria  No clear issues with language of fund of knowledge     Cranial Nerve Exam     CN III, IV,VI-EOMI, No nystagmus, conjugate eye movements, no ptosis    CN VII-no facial assymetry       Motor Exam  antigravity throughout upper extremities bilaterally      Tremors- no tremors in hands or head noted     Gait  Not tested      Nursing/pcp notes, imaging,labs and vitals reviewed.      PT,OT and/or speech notes reviewed    Lab Results   Component Value Date    WBC 7.8 10/29/2021    HGB 10.2 (L) 10/29/2021    HCT 32.1 (L) 10/29/2021    MCV 86.1 10/29/2021     10/29/2021     Lab Results   Component Value Date     (L) 10/29/2021    K 4.3 10/29/2021    CL 95 (L) 10/29/2021    CO2 29 10/29/2021    BUN 12 10/29/2021    CREATININE 0.4 (L) 10/29/2021    GLUCOSE 108 10/29/2021    CALCIUM 8.3 (L) 10/29/2021    PROT 7.4 10/29/2021    LABALBU 2.7 (L) 10/29/2021    BILITOT 1.0 10/29/2021    ALKPHOS 130 (H) 10/29/2021    AST 20 10/29/2021    ALT <5 (A) 10/29/2021    LABGLOM >60 10/29/2021    GFRAA >59 10/29/2021     Lab Results   Component Value Date    INR 1.50 (H) 10/28/2021    INR 1.20 (H) 12/05/2018    INR 1.14 09/04/2018    PROTIME 18.2 (H) 10/28/2021    PROTIME 15.2 (H) 12/05/2018    PROTIME 14.5 09/04/2018             RECORD REVIEW: Previous medical records, medications were reviewed at today's visit    IMPRESSION:   1. MSSA bacteremia/wound infection-Abx as per ID  2. CAD-ASA/Indur  3. HTN-on meds  4. GI-bowel regimen/PPI/Carafate/Lactobacillus   5. Anemia-iron  6. Hypothyroidism-on Synthroid  7. Back pain-Lidoderm patch/Tramadol/Noroc PRN  8. History of DVT-on Xarelto  9. History of Sjogren's  10. Arthritis-Salsalate   11. History of herpes simplex-on Valtrex   12.  PT/OT    Continue care      Expected duration and frequency therapy: 180 minutes per day, 5 days per week    [address]  [place]  Dr Edyta Perales

## 2021-10-30 NOTE — PROGRESS NOTES
Occupational Therapy     10/30/21 1100   Pre Treatment Pain Screening   Intervention List Patient able to continue with treatment   Pain Assessment   Patient Currently in Pain Yes   Pain Assessment 0-10   Pain Level 8   Pain Type Acute pain   Pain Location Hip;Groin   Pain Orientation Right; Anterior   Pain Descriptors Aching;Discomfort   Pain Frequency Continuous   Clinical Progression Not changed   Response to Pain Intervention Patient Satisfied   Balance   Sitting Balance Moderate assistance  (GGA to Mod A during EOB ADL tasks.)   Bed mobility   Rolling to Left Moderate assistance   Rolling to Right Moderate assistance   Comment Difficulty comprehending cues for log roll tech. Transfers   Slide Board Maximum assistance;2 Person assistance  (Bed>w/c, w/c>drop-arm recliner.)   Type of ROM/Therapeutic Exercise   Type of ROM/Therapeutic Exercise Loan   Comment 10# BUE 2 sets x 10 reps, cues for full ROM. Assessment   Performance deficits / Impairments Decreased functional mobility ; Decreased ADL status; Decreased strength;Decreased high-level IADLs;Decreased balance;Decreased endurance;Decreased cognition   Treatment Diagnosis [facility] Balloon Kyphoplasty at T12 on 10/26/2021, R hip wound,MSSA Bactermia/status post myocutaneous and fasciocutaneous flap advancements for coverage of a chronic right hip wound/s/p I&D of right hip wound/s/p kyphoplasty   Timed Code Treatment Minutes 60 Minutes   Activity Tolerance   Activity Tolerance Patient limited by pain   Safety Devices   Safety Devices in place Yes   Type of devices Call light within reach; Chair alarm in place   Plan   Current Treatment Recommendations Strengthening;Balance Training;Functional Mobility Training; Endurance Training; Safety Education & Training;Patient/Caregiver Education & Training;Equipment Evaluation, Education, & procurement;Self-Care / ADL; Home Management Training      10/30/21 1100   Eating   Assistance Needed Setup or clean-up assistance

## 2021-10-31 PROCEDURE — 6370000000 HC RX 637 (ALT 250 FOR IP): Performed by: PSYCHIATRY & NEUROLOGY

## 2021-10-31 PROCEDURE — 1180000000 HC REHAB R&B

## 2021-10-31 PROCEDURE — 2580000003 HC RX 258: Performed by: PSYCHIATRY & NEUROLOGY

## 2021-10-31 PROCEDURE — 6360000002 HC RX W HCPCS: Performed by: PSYCHIATRY & NEUROLOGY

## 2021-10-31 RX ADMIN — ASPIRIN 81 MG: 81 TABLET, COATED ORAL at 07:50

## 2021-10-31 RX ADMIN — CEFAZOLIN SODIUM 2000 MG: 2 INJECTION, SOLUTION INTRAVENOUS at 20:11

## 2021-10-31 RX ADMIN — LEVOTHYROXINE SODIUM 75 MCG: 75 TABLET ORAL at 05:41

## 2021-10-31 RX ADMIN — OXYCODONE 10 MG: 5 TABLET ORAL at 17:23

## 2021-10-31 RX ADMIN — OXYCODONE 10 MG: 5 TABLET ORAL at 12:49

## 2021-10-31 RX ADMIN — TRAMADOL HYDROCHLORIDE 50 MG: 50 TABLET, COATED ORAL at 20:11

## 2021-10-31 RX ADMIN — FUROSEMIDE 40 MG: 40 TABLET ORAL at 07:49

## 2021-10-31 RX ADMIN — CARVEDILOL 25 MG: 25 TABLET, FILM COATED ORAL at 16:59

## 2021-10-31 RX ADMIN — SUCRALFATE 1 G: 1 TABLET ORAL at 12:49

## 2021-10-31 RX ADMIN — ISOSORBIDE MONONITRATE 60 MG: 60 TABLET, EXTENDED RELEASE ORAL at 07:49

## 2021-10-31 RX ADMIN — NALOXEGOL OXALATE 25 MG: 12.5 TABLET, FILM COATED ORAL at 07:50

## 2021-10-31 RX ADMIN — FERROUS SULFATE TAB 325 MG (65 MG ELEMENTAL FE) 325 MG: 325 (65 FE) TAB at 07:49

## 2021-10-31 RX ADMIN — CEFAZOLIN SODIUM 2000 MG: 2 INJECTION, SOLUTION INTRAVENOUS at 14:07

## 2021-10-31 RX ADMIN — ACETAMINOPHEN 650 MG: 325 TABLET ORAL at 11:45

## 2021-10-31 RX ADMIN — TRAMADOL HYDROCHLORIDE 50 MG: 50 TABLET, COATED ORAL at 07:49

## 2021-10-31 RX ADMIN — POTASSIUM CHLORIDE 20 MEQ: 1500 TABLET, EXTENDED RELEASE ORAL at 07:49

## 2021-10-31 RX ADMIN — VALACYCLOVIR HYDROCHLORIDE 500 MG: 500 TABLET, FILM COATED ORAL at 07:50

## 2021-10-31 RX ADMIN — Medication 1 TABLET: at 07:50

## 2021-10-31 RX ADMIN — MAGNESIUM OXIDE TAB 400 MG (240 MG ELEMENTAL MG) 400 MG: 400 (240 MG) TAB at 07:49

## 2021-10-31 RX ADMIN — OXYCODONE 10 MG: 5 TABLET ORAL at 03:04

## 2021-10-31 RX ADMIN — OXYCODONE 10 MG: 5 TABLET ORAL at 08:41

## 2021-10-31 RX ADMIN — SODIUM CHLORIDE, PRESERVATIVE FREE 10 ML: 5 INJECTION INTRAVENOUS at 20:12

## 2021-10-31 RX ADMIN — RIVAROXABAN 20 MG: 20 TABLET, FILM COATED ORAL at 07:50

## 2021-10-31 RX ADMIN — Medication 1 CAPSULE: at 07:49

## 2021-10-31 RX ADMIN — CEFAZOLIN SODIUM 2000 MG: 2 INJECTION, SOLUTION INTRAVENOUS at 05:41

## 2021-10-31 RX ADMIN — OXYCODONE 10 MG: 5 TABLET ORAL at 21:31

## 2021-10-31 RX ADMIN — CARVEDILOL 25 MG: 25 TABLET, FILM COATED ORAL at 07:49

## 2021-10-31 RX ADMIN — PANTOPRAZOLE SODIUM 40 MG: 40 TABLET, DELAYED RELEASE ORAL at 07:51

## 2021-10-31 RX ADMIN — DOCUSATE SODIUM 100 MG: 100 CAPSULE ORAL at 07:49

## 2021-10-31 RX ADMIN — SODIUM CHLORIDE, PRESERVATIVE FREE 10 ML: 5 INJECTION INTRAVENOUS at 14:07

## 2021-10-31 RX ADMIN — POTASSIUM CHLORIDE 20 MEQ: 1500 TABLET, EXTENDED RELEASE ORAL at 20:11

## 2021-10-31 RX ADMIN — SUCRALFATE 1 G: 1 TABLET ORAL at 16:59

## 2021-10-31 RX ADMIN — TRAMADOL HYDROCHLORIDE 50 MG: 50 TABLET, COATED ORAL at 14:07

## 2021-10-31 RX ADMIN — ISOSORBIDE MONONITRATE 60 MG: 60 TABLET, EXTENDED RELEASE ORAL at 20:11

## 2021-10-31 RX ADMIN — SUCRALFATE 1 G: 1 TABLET ORAL at 07:51

## 2021-10-31 RX ADMIN — LISINOPRIL 10 MG: 10 TABLET ORAL at 07:49

## 2021-10-31 RX ADMIN — SUCRALFATE 1 G: 1 TABLET ORAL at 20:11

## 2021-10-31 RX ADMIN — THIAMINE HCL TAB 100 MG 250 MG: 100 TAB at 07:50

## 2021-10-31 RX ADMIN — FOLIC ACID 1 MG: 1 TABLET ORAL at 07:50

## 2021-10-31 ASSESSMENT — PAIN DESCRIPTION - FREQUENCY
FREQUENCY: CONTINUOUS

## 2021-10-31 ASSESSMENT — PAIN DESCRIPTION - LOCATION
LOCATION: BACK;GROIN
LOCATION: BACK;HIP
LOCATION: BACK;GROIN
LOCATION: BACK;HIP
LOCATION: BACK;GROIN

## 2021-10-31 ASSESSMENT — PAIN SCALES - GENERAL
PAINLEVEL_OUTOF10: 0
PAINLEVEL_OUTOF10: 8
PAINLEVEL_OUTOF10: 6
PAINLEVEL_OUTOF10: 0
PAINLEVEL_OUTOF10: 7
PAINLEVEL_OUTOF10: 0
PAINLEVEL_OUTOF10: 6
PAINLEVEL_OUTOF10: 8
PAINLEVEL_OUTOF10: 10
PAINLEVEL_OUTOF10: 6
PAINLEVEL_OUTOF10: 9
PAINLEVEL_OUTOF10: 0
PAINLEVEL_OUTOF10: 10
PAINLEVEL_OUTOF10: 10
PAINLEVEL_OUTOF10: 7
PAINLEVEL_OUTOF10: 8
PAINLEVEL_OUTOF10: 9

## 2021-10-31 ASSESSMENT — PAIN DESCRIPTION - DESCRIPTORS
DESCRIPTORS: SHARP;SORE;STABBING
DESCRIPTORS: SHARP;SORE;STABBING
DESCRIPTORS: SORE
DESCRIPTORS: SORE;SHARP
DESCRIPTORS: ACHING;SHARP
DESCRIPTORS: SORE

## 2021-10-31 ASSESSMENT — PAIN DESCRIPTION - ORIENTATION
ORIENTATION: RIGHT;LEFT

## 2021-10-31 ASSESSMENT — PAIN - FUNCTIONAL ASSESSMENT
PAIN_FUNCTIONAL_ASSESSMENT: PREVENTS OR INTERFERES SOME ACTIVE ACTIVITIES AND ADLS

## 2021-10-31 ASSESSMENT — PAIN DESCRIPTION - PROGRESSION
CLINICAL_PROGRESSION: NOT CHANGED
CLINICAL_PROGRESSION: GRADUALLY WORSENING
CLINICAL_PROGRESSION: NOT CHANGED
CLINICAL_PROGRESSION: GRADUALLY WORSENING
CLINICAL_PROGRESSION: GRADUALLY WORSENING

## 2021-10-31 ASSESSMENT — PAIN DESCRIPTION - ONSET
ONSET: ON-GOING

## 2021-10-31 ASSESSMENT — PAIN DESCRIPTION - PAIN TYPE
TYPE: SURGICAL PAIN

## 2021-10-31 NOTE — PROGRESS NOTES
Infectious Diseases Progress Note    Patient:  Jazmyne Fair  YOB: 1955  MRN: [id number]   Admit date: 10/28/2021   Admitting Physician: Gibran Meléndez MD  Primary Care Physician: Ludwin Santoyo MD    Chief Complaint/Interval History: She is without fever. She has no rash or skin itching. She is having right hip pain. When asked to localize it she points to the right groin area. She has not fallen. It is hurting her both at rest and with movement. She indicates they did x-rays yesterday that were negative. Physical therapy is being limited by severity of her right hip pain. In/Out    Intake/Output Summary (Last 24 hours) at 10/31/2021 1451  Last data filed at 10/31/2021 1345  Gross per 24 hour   Intake 600 ml   Output 550 ml   Net 50 ml     Allergies:    Allergies   Allergen Reactions    Amoxicillin Rash    Lipitor Rash    Niaspan [Niacin Er] Rash    Penicillins Rash    Relafen [Nabumetone] Rash    Zocor [Simvastatin] Rash     Muscle cramps     Current Meds: oxyCODONE (ROXICODONE) immediate release tablet 10 mg, Q4H PRN  cloNIDine (CATAPRES) tablet 0.1 mg, Q4H PRN  lisinopril (PRINIVIL;ZESTRIL) tablet 10 mg, Daily  traMADol (ULTRAM) tablet 50 mg, TID  acetaminophen (TYLENOL) tablet 650 mg, Q4H PRN  aspirin EC tablet 81 mg, Daily  carvedilol (COREG) tablet 25 mg, BID WC  docusate sodium (COLACE) capsule 100 mg, Daily  ferrous sulfate (IRON 325) tablet 325 mg, Daily with breakfast  fluticasone (FLONASE) 50 MCG/ACT nasal spray 1 spray, Daily PRN  folic acid (FOLVITE) tablet 1 mg, Daily  furosemide (LASIX) tablet 40 mg, Daily  isosorbide mononitrate (IMDUR) extended release tablet 60 mg, BID  levothyroxine (SYNTHROID) tablet 75 mcg, Daily  lidocaine 4 % external patch 1 patch, Daily  magnesium oxide (MAG-OX) tablet 400 mg, Daily  therapeutic multivitamin-minerals 1 tablet, Daily  naloxegol (MOVANTIK) tablet 25 mg, QAM  nitroGLYCERIN (NITROSTAT) SL tablet 0.4 mg, Q5 Min PRN  pantoprazole (PROTONIX) tablet 40 mg, Daily  potassium chloride (KLOR-CON M) extended release tablet 20 mEq, BID  rivaroxaban (XARELTO) tablet 20 mg, Daily  salsalate (DISALCID) tablet 500 mg, Daily  sucralfate (CARAFATE) tablet 1 g, 4x Daily PC & HS  valACYclovir (VALTREX) tablet 500 mg, Daily  thiamine mononitrate tablet 250 mg, Daily  acetaminophen (TYLENOL) tablet 650 mg, Q4H PRN  polyethylene glycol (GLYCOLAX) packet 17 g, Daily PRN  ceFAZolin (ANCEF) 2000 mg in dextrose 4 % 100 mL IVPB (premix), Q8H  lactobacillus (CULTURELLE) capsule 1 capsule, Daily  sodium chloride flush 0.9 % injection 5-40 mL, BID  sodium chloride flush 0.9 % injection 5-40 mL, PRN      Review of Systems    VitalSigns:  /70   Pulse 66   Temp 96.3 °F (35.7 °C) (Temporal)   Resp 18   Ht 5' 6\" (1.676 m)   Wt 164 lb (74.4 kg)   SpO2 98%   BMI 26.47 kg/m²      Physical Exam  Line/IV (PICC) site: No erythema, warmth, induration, or tenderness. Right hip incision without purulent drainage  No surrounding erythema  She has intact sensation both lower extremities  With some assistance she is able to flex and extend at the knee and the foot. She can move at the hip as well a little bit but it causes some discomfort. No clonus with forced dorsiflexion of her right foot    Lab Results:  CBC:   Recent Labs     10/29/21  0407   WBC 7.8   HGB 10.2*        BMP:  Recent Labs     10/29/21  0407   *   K 4.3   CL 95*   CO2 29   BUN 12   CREATININE 0.4*   GLUCOSE 108     CultureResults: None    Radiology:   Bilateral hip x-rays done yesterday:  Impression   1. Possible air in the soft tissues lateral to the right hip and   pelvis. Soft tissue infection cannot be ruled out. 2. The hip joint spaces are symmetric and well maintained bilaterally. No evidence of any acute bony abnormality. There is mild enthesopathy   of the pelvis. 3. Soft tissue ossifications bilaterally likely related to prior   trauma.  Correlate clinically.       Signed by Dr Isaura Desir     Additional Studies Reviewed:  None    Impression:  1. MSSA bacteremia-under treatment  2. Right hip pain-etiology uncertain. X-rays negative. She seems to point toward the right groin as the location for her discomfort. She potentially could have aseptic necrosis. Septic arthritis a possibility. 3.  Rheumatoid arthritis  4. Previous wound infection at flap site right hip-improving  5.   Recent T12 kyphoplasty    Recommendations:  Continue cefazolin  Planning 4 weeks of cefazolin treatment  If she continues to have right hip pain, would consider MRI of the right hip without and with gadolinium-this is a more sensitive study than x-ray for the possibility of aseptic necrosis and will also provide evidence of septic arthritis of present  Continue attempts at physical therapy  Continue supportive care  Continue to follow      Bryan Lama MD

## 2021-11-01 LAB
ALBUMIN SERPL-MCNC: 2.7 G/DL (ref 3.5–5.2)
ALP BLD-CCNC: 140 U/L (ref 35–104)
ALT SERPL-CCNC: <5 U/L (ref 5–33)
ANION GAP SERPL CALCULATED.3IONS-SCNC: 8 MMOL/L (ref 7–19)
AST SERPL-CCNC: 18 U/L (ref 5–32)
BASOPHILS ABSOLUTE: 0 K/UL (ref 0–0.2)
BASOPHILS RELATIVE PERCENT: 0.6 % (ref 0–1)
BILIRUB SERPL-MCNC: 0.7 MG/DL (ref 0.2–1.2)
BUN BLDV-MCNC: 29 MG/DL (ref 8–23)
CALCIUM SERPL-MCNC: 8.8 MG/DL (ref 8.8–10.2)
CHLORIDE BLD-SCNC: 95 MMOL/L (ref 98–111)
CO2: 29 MMOL/L (ref 22–29)
CREAT SERPL-MCNC: 0.7 MG/DL (ref 0.5–0.9)
EOSINOPHILS ABSOLUTE: 0.1 K/UL (ref 0–0.6)
EOSINOPHILS RELATIVE PERCENT: 1.8 % (ref 0–5)
GFR AFRICAN AMERICAN: >59
GFR NON-AFRICAN AMERICAN: >60
GLUCOSE BLD-MCNC: 112 MG/DL (ref 74–109)
HCT VFR BLD CALC: 30 % (ref 37–47)
HEMOGLOBIN: 9.5 G/DL (ref 12–16)
IMMATURE GRANULOCYTES #: 0.1 K/UL
LYMPHOCYTES ABSOLUTE: 1.9 K/UL (ref 1.1–4.5)
LYMPHOCYTES RELATIVE PERCENT: 29 % (ref 20–40)
MCH RBC QN AUTO: 27.4 PG (ref 27–31)
MCHC RBC AUTO-ENTMCNC: 31.7 G/DL (ref 33–37)
MCV RBC AUTO: 86.5 FL (ref 81–99)
MONOCYTES ABSOLUTE: 0.8 K/UL (ref 0–0.9)
MONOCYTES RELATIVE PERCENT: 12.9 % (ref 0–10)
NEUTROPHILS ABSOLUTE: 3.5 K/UL (ref 1.5–7.5)
NEUTROPHILS RELATIVE PERCENT: 53.7 % (ref 50–65)
PDW BLD-RTO: 18.1 % (ref 11.5–14.5)
PLATELET # BLD: 298 K/UL (ref 130–400)
PMV BLD AUTO: 9.3 FL (ref 9.4–12.3)
POTASSIUM REFLEX MAGNESIUM: 5 MMOL/L (ref 3.5–5)
RBC # BLD: 3.47 M/UL (ref 4.2–5.4)
SODIUM BLD-SCNC: 132 MMOL/L (ref 136–145)
TOTAL PROTEIN: 7.4 G/DL (ref 6.6–8.7)
WBC # BLD: 6.5 K/UL (ref 4.8–10.8)

## 2021-11-01 PROCEDURE — 97530 THERAPEUTIC ACTIVITIES: CPT

## 2021-11-01 PROCEDURE — 80053 COMPREHEN METABOLIC PANEL: CPT

## 2021-11-01 PROCEDURE — 85025 COMPLETE CBC W/AUTO DIFF WBC: CPT

## 2021-11-01 PROCEDURE — 99233 SBSQ HOSP IP/OBS HIGH 50: CPT | Performed by: PSYCHIATRY & NEUROLOGY

## 2021-11-01 PROCEDURE — 6360000002 HC RX W HCPCS: Performed by: PSYCHIATRY & NEUROLOGY

## 2021-11-01 PROCEDURE — 6370000000 HC RX 637 (ALT 250 FOR IP): Performed by: PSYCHIATRY & NEUROLOGY

## 2021-11-01 PROCEDURE — 97116 GAIT TRAINING THERAPY: CPT

## 2021-11-01 PROCEDURE — 1180000000 HC REHAB R&B

## 2021-11-01 PROCEDURE — 2580000003 HC RX 258: Performed by: PSYCHIATRY & NEUROLOGY

## 2021-11-01 PROCEDURE — 97110 THERAPEUTIC EXERCISES: CPT

## 2021-11-01 PROCEDURE — 97535 SELF CARE MNGMENT TRAINING: CPT

## 2021-11-01 RX ADMIN — OXYCODONE 10 MG: 5 TABLET ORAL at 10:16

## 2021-11-01 RX ADMIN — POTASSIUM CHLORIDE 20 MEQ: 1500 TABLET, EXTENDED RELEASE ORAL at 08:15

## 2021-11-01 RX ADMIN — DOCUSATE SODIUM 100 MG: 100 CAPSULE ORAL at 08:13

## 2021-11-01 RX ADMIN — NALOXEGOL OXALATE 25 MG: 12.5 TABLET, FILM COATED ORAL at 08:12

## 2021-11-01 RX ADMIN — CEFAZOLIN SODIUM 2000 MG: 2 INJECTION, SOLUTION INTRAVENOUS at 05:46

## 2021-11-01 RX ADMIN — SUCRALFATE 1 G: 1 TABLET ORAL at 08:12

## 2021-11-01 RX ADMIN — CEFAZOLIN SODIUM 2000 MG: 2 INJECTION, SOLUTION INTRAVENOUS at 14:48

## 2021-11-01 RX ADMIN — FERROUS SULFATE TAB 325 MG (65 MG ELEMENTAL FE) 325 MG: 325 (65 FE) TAB at 08:13

## 2021-11-01 RX ADMIN — LEVOTHYROXINE SODIUM 75 MCG: 75 TABLET ORAL at 05:46

## 2021-11-01 RX ADMIN — Medication 1 CAPSULE: at 08:12

## 2021-11-01 RX ADMIN — TRAMADOL HYDROCHLORIDE 50 MG: 50 TABLET, COATED ORAL at 08:11

## 2021-11-01 RX ADMIN — CARVEDILOL 25 MG: 25 TABLET, FILM COATED ORAL at 16:59

## 2021-11-01 RX ADMIN — Medication 1 TABLET: at 08:13

## 2021-11-01 RX ADMIN — LISINOPRIL 10 MG: 10 TABLET ORAL at 08:13

## 2021-11-01 RX ADMIN — THIAMINE HCL TAB 100 MG 250 MG: 100 TAB at 08:12

## 2021-11-01 RX ADMIN — FOLIC ACID 1 MG: 1 TABLET ORAL at 08:12

## 2021-11-01 RX ADMIN — SODIUM CHLORIDE, PRESERVATIVE FREE 10 ML: 5 INJECTION INTRAVENOUS at 08:16

## 2021-11-01 RX ADMIN — SUCRALFATE 1 G: 1 TABLET ORAL at 20:07

## 2021-11-01 RX ADMIN — MAGNESIUM OXIDE TAB 400 MG (240 MG ELEMENTAL MG) 400 MG: 400 (240 MG) TAB at 08:12

## 2021-11-01 RX ADMIN — OXYCODONE 10 MG: 5 TABLET ORAL at 20:07

## 2021-11-01 RX ADMIN — VALACYCLOVIR HYDROCHLORIDE 500 MG: 500 TABLET, FILM COATED ORAL at 08:12

## 2021-11-01 RX ADMIN — CARVEDILOL 25 MG: 25 TABLET, FILM COATED ORAL at 08:13

## 2021-11-01 RX ADMIN — ISOSORBIDE MONONITRATE 60 MG: 60 TABLET, EXTENDED RELEASE ORAL at 08:13

## 2021-11-01 RX ADMIN — PANTOPRAZOLE SODIUM 40 MG: 40 TABLET, DELAYED RELEASE ORAL at 08:12

## 2021-11-01 RX ADMIN — FUROSEMIDE 40 MG: 40 TABLET ORAL at 08:12

## 2021-11-01 RX ADMIN — OXYCODONE 10 MG: 5 TABLET ORAL at 03:13

## 2021-11-01 RX ADMIN — SUCRALFATE 1 G: 1 TABLET ORAL at 12:13

## 2021-11-01 RX ADMIN — POTASSIUM CHLORIDE 20 MEQ: 1500 TABLET, EXTENDED RELEASE ORAL at 20:07

## 2021-11-01 RX ADMIN — CEFAZOLIN SODIUM 2000 MG: 2 INJECTION, SOLUTION INTRAVENOUS at 20:06

## 2021-11-01 RX ADMIN — TRAMADOL HYDROCHLORIDE 50 MG: 50 TABLET, COATED ORAL at 20:07

## 2021-11-01 RX ADMIN — SODIUM CHLORIDE, PRESERVATIVE FREE 10 ML: 5 INJECTION INTRAVENOUS at 20:07

## 2021-11-01 RX ADMIN — ISOSORBIDE MONONITRATE 60 MG: 60 TABLET, EXTENDED RELEASE ORAL at 20:07

## 2021-11-01 RX ADMIN — TRAMADOL HYDROCHLORIDE 50 MG: 50 TABLET, COATED ORAL at 14:48

## 2021-11-01 RX ADMIN — SUCRALFATE 1 G: 1 TABLET ORAL at 16:59

## 2021-11-01 RX ADMIN — ASPIRIN 81 MG: 81 TABLET, COATED ORAL at 08:13

## 2021-11-01 RX ADMIN — RIVAROXABAN 20 MG: 20 TABLET, FILM COATED ORAL at 08:13

## 2021-11-01 ASSESSMENT — PAIN SCALES - GENERAL
PAINLEVEL_OUTOF10: 6
PAINLEVEL_OUTOF10: 8
PAINLEVEL_OUTOF10: 10
PAINLEVEL_OUTOF10: 9
PAINLEVEL_OUTOF10: 8
PAINLEVEL_OUTOF10: 7
PAINLEVEL_OUTOF10: 0
PAINLEVEL_OUTOF10: 0
PAINLEVEL_OUTOF10: 10

## 2021-11-01 ASSESSMENT — PAIN DESCRIPTION - LOCATION
LOCATION: BACK
LOCATION: BACK;HIP

## 2021-11-01 ASSESSMENT — PAIN DESCRIPTION - PROGRESSION
CLINICAL_PROGRESSION: NOT CHANGED

## 2021-11-01 ASSESSMENT — PAIN DESCRIPTION - ONSET
ONSET: ON-GOING
ONSET: ON-GOING

## 2021-11-01 ASSESSMENT — PAIN DESCRIPTION - PAIN TYPE: TYPE: SURGICAL PAIN

## 2021-11-01 ASSESSMENT — PAIN DESCRIPTION - ORIENTATION
ORIENTATION: MID;LOWER
ORIENTATION: MID
ORIENTATION: MID

## 2021-11-01 ASSESSMENT — PAIN DESCRIPTION - FREQUENCY
FREQUENCY: CONTINUOUS
FREQUENCY: CONTINUOUS

## 2021-11-01 ASSESSMENT — PAIN DESCRIPTION - DESCRIPTORS
DESCRIPTORS: ACHING
DESCRIPTORS: ACHING
DESCRIPTORS: DISCOMFORT;SORE;ACHING

## 2021-11-01 NOTE — PROGRESS NOTES
Nutrition Assessment     Type and Reason for Visit: Reassess    Nutrition Assessment:  Pt unavailable at time of visit. Pt remains malnourished AEB poor po intake of meals, <50%. Notified by OT for plans to do a feeding therapy session to address self-feeding difficulties/fatigue. Continue current ONS regimen at this time. Malnutrition Assessment:  Malnutrition Status: Severe malnutrition    Current Nutrition Therapies:    ADULT ORAL NUTRITION SUPPLEMENT; Lunch, Dinner; Wound Healing Oral Supplement  ADULT ORAL NUTRITION SUPPLEMENT; Breakfast, Lunch, Dinner; Standard High Calorie/High Protein Oral Supplement  ADULT DIET; Regular    Anthropometric Measures:  · Height: 5' 6\" (167.6 cm)  · Current Body Wt: 164 lb (74.4 kg)   · BMI: 26.5    Nutrition Interventions:   Food and/or Nutrient Delivery:  Continue Current Diet, Continue Oral Nutrition Supplement   Coordination of Nutrition Care:  Continue to monitor while inpatient    Goals:  Po intake 50% or greater of meals and ONS. wound healing. Nutrition Monitoring and Evaluation:   Food/Nutrient Intake Outcomes:  Food and Nutrient Intake, Supplement Intake  Physical Signs/Symptoms Outcomes:  Biochemical Data, Nutrition Focused Physical Findings, Skin, Weight     Discharge Planning:     Too soon to determine     Electronically signed by Nils Lundberg, MS, RD, LD on 11/1/21 at 2:47 PM CDT    Contact: [phone]

## 2021-11-01 NOTE — PLAN OF CARE
Problem: Infection:  Goal: Will remain free from infection  Description: Will remain free from infection  Outcome: Ongoing     Problem: Safety:  Goal: Free from accidental physical injury  Description: Free from accidental physical injury  Outcome: Ongoing  Goal: Free from intentional harm  Description: Free from intentional harm  Outcome: Ongoing     Problem: Daily Care:  Goal: Daily care needs are met  Description: Daily care needs are met  Outcome: Ongoing     Problem: Pain:  Goal: Patient's pain/discomfort is manageable  Description: Patient's pain/discomfort is manageable  Outcome: Ongoing  Goal: Pain level will decrease  Description: Pain level will decrease  Outcome: Ongoing  Goal: Control of acute pain  Description: Control of acute pain  Outcome: Ongoing  Goal: Control of chronic pain  Description: Control of chronic pain  Outcome: Ongoing     Problem: Discharge Planning:  Goal: Patients continuum of care needs are met  Description: Patients continuum of care needs are met  Outcome: Ongoing     Problem: Falls - Risk of:  Goal: Will remain free from falls  Description: Will remain free from falls  Outcome: Ongoing  Goal: Absence of physical injury  Description: Absence of physical injury  Outcome: Ongoing     Problem: Sensory:  Goal: General experience of comfort will improve  Description: General experience of comfort will improve  Outcome: Ongoing     Problem: Urinary Elimination:  Goal: Signs and symptoms of infection will decrease  Description: Signs and symptoms of infection will decrease  Outcome: Ongoing  Goal: Ability to reestablish a normal urinary elimination pattern will improve - after catheter removal  Description: Ability to reestablish a normal urinary elimination pattern will improve  Outcome: Ongoing  Goal: Complications related to the disease process, condition or treatment will be avoided or minimized  Description: Complications related to the disease process, condition or treatment will be avoided or minimized  Outcome: Ongoing     Problem: Skin Integrity:  Goal: Will show no infection signs and symptoms  Description: Will show no infection signs and symptoms  Outcome: Ongoing  Goal: Absence of new skin breakdown  Description: Absence of new skin breakdown  Outcome: Ongoing     Problem: Nutrition  Goal: Optimal nutrition therapy  Outcome: Ongoing

## 2021-11-01 NOTE — PROGRESS NOTES
Patient:   Odalys Leon  MR#:    [id number]   Room:    0820/820-02   YOB: 1955  Date of Progress Note: 11/1/2021  Time of Note                           11:08 AM  Consulting Physician:   Tahira Johnson M.D. Attending Physician:  Tahira Johnson MD     Chief complaint MSSA Bactermia/status post myocutaneous and fasciocutaneous flap advancements for coverage of a chronic right hip wound/s/p I&D of right hip wound/s/p kyphoplasty     S:This 74 y.o. female admitted to [facility] on 10/21/2021 after suffering a fall off of her bed at home on 10/18/2021. She presented to the ED on the 10/21 with complaint of back pain. Imaging revealed a T12 compression. Initially plan was to treat patient conservatively with TLSO brace. Subsequent workup identified staph bacteremia, confirmed by 2 of 2 peripheral blood cultures, and a UTI with 4+ bacteria. Out of concern that the UTI might not represent the primary source of the bacteremia, additional workup was appropriately performed. CT imaging of the right hip subsequently identified a fluid collection and soft tissue induration, which triggered concern for an abscess. Mrs. Aziza Mayes is 5 weeks and 3 days status post myocutaneous and fasciocutaneous flap advancements for coverage of a chronic right hip wound. Incidentally, tissue cultures were sent at the time of her coverage procedure, and these resulted in no growth. Understanding the patient's prior surgical history, the findings on CT are not unexpected. In other words, a deep space fluid collection and tissue induration 5 weeks after the procedure described above is not at all surprising, and in isolation, would not be a cause for concern. Given the circumstances of bacteremia; however, ruling out abscess or infected seroma pocket is necessary. Even if the fluid collection does not represent the etiology of the bacteremia, there is concern that it could have potentially become seeded.    Examination of the right hip was reassuring. There was no cellulitis or warmth. The surgical incision was well healed. The patient reports that she has been ambulating without difficulty. She has no tenderness to palpation over the fluid collection. Although it is essential to rule out infection at this site, it is premature to take the patient to the OR and re-create a wound that she previously struggled to manage for 7 months. A very reasonable course of action would be to attempt to aspirate the fluid collection under fluoroscopic guidance. If the contents are frankly purulent or if the fluid subsequently resulted in growth on culture, then  perform an I&D. If the aspiration reveals seroma and no growth on culture, then there is an opportunity to spare  patient re-creation of a previously very difficult wound. On 10/24,  CT imaging indicated a small fluid collection at the right hip at the location of her previous wound. This was aspirated and Gram stain showed presence of gram-positive cocci. Decision was made to proceed with I&D. Patient tolerated procedure well. Infectious disease was consulted for antibiotic management. Regarding her T12 compression fx, she has been unresponsive to nonoperative treatment modalities including analgesics including opioids, lidocaine patch, nonsteroidal anti-inflammatories, and muscle relaxants as well as bracing with TLSO. These fractures were sustained with minimal trauma and she has low bone density consistent with osteoporosis. She has been unresponsive to nonoperative treatment modalities including analgesics including opioids, lidocaine patch, nonsteroidal anti-inflammatories, and muscle relaxants as well as bracing with TLSO. These fractures were sustained with minimal trauma and he has low bone density consistent with osteoporosis. Based on these findings, decision was made to perform [facility] Balloon Kyphoplasty at T12 on 10/26/2021 by Dr Clifford Pritchard.  She tolerated procedure well. She is now medically stable and is participating with therapy. She is ready to begin rehab with plan of returning home after rehab dc with support from her 24/7 caregiver and family. Chronic pain right hip. No new issues. \"I don't well\". Doesn't explain what she means by this. REVIEW OF SYSTEMS:  Constitutional: No fevers No chills  Neck:No stiffness  Respiratory: No shortness of breath  Cardiovascular: No chest pain No palpitations  Gastrointestinal: No abdominal pain    Genitourinary: No Dysuria  Neurological: No headache, no confusion    Past Medical History:      Diagnosis Date    CAD (coronary artery disease)     S/p Stent to right coronary artery 9/2010. S/p myocardial infarction May 2013.  Cellulitis     LT LEG IN PAST    History of blood transfusion     History of DVT (deep vein thrombosis)     LT    History of neutropenia     History of pulmonary fibrosis     Hx of blood clots 1992    LT LEG    Hypercholesterolemia     Hypertension     Intrinsic asthma     Pacemaker 11/17/2016    Post op infection     \"right knee leaking after my replacement\"    Rheumatoid arteritis ([address]     Sinus node dysfunction ([address]     Staph aureus infection     Status post placement of implantable loop recorder 2/13/15, 3/30/15    2/19/15  removed, infection    Syncope 2/11/2015    Thyroid disease     HYPOTHYROIDISM       Past Surgical History:      Procedure Laterality Date    BACK SURGERY  01/01/2018    CARDIAC CATHETERIZATION  5/14/14  MDL    normal LV function. EF 60%    CARDIAC CATHETERIZATION  1/26/15  MDL    EF 60%, Left circ has long 80-90% in small 2mm vessel, diffuse disease in RCA    CORONARY ANGIOPLASTY WITH STENT PLACEMENT  2014    DIAGNOSTIC CARDIAC CATH LAB PROCEDURE      Right coronary artery stent.  9/2010    JOINT REPLACEMENT      LEEP      PACEMAKER INSERTION  11/17/2016    Dr Amaris Ruggiero; [employer]    PACEMAKER PLACEMENT      [employer]    [place] INCIS/DRAIN THIGH/KNEE ABSCESS,DEEP Right 10/23/2018    KNEE INCISION AND DRAINAGE performed by Buckley Brunner, MD at [address] ARTHROPLASTY Right 9/11/2018    KNEE TOTAL ARTHROPLASTY performed by Buckley Brunner, MD at [facility] Right 12/6/2018    KNEE EXPLANT AND PLACEMENT OF ANTIBIOTIC SPACER performed by Buckley Brunner, MD at [address] [facility] OR       Medications in Hospital:      Current Facility-Administered Medications:     oxyCODONE (ROXICODONE) immediate release tablet 10 mg, 10 mg, Oral, Q4H PRN, Franklin Murrieta MD, 10 mg at 11/01/21 1016    cloNIDine (CATAPRES) tablet 0.1 mg, 0.1 mg, Oral, Q4H PRN, Franklin Murrieta MD    lisinopril (PRINIVIL;ZESTRIL) tablet 10 mg, 10 mg, Oral, Daily, Franklin Murrieta MD, 10 mg at 11/01/21 0813    traMADol (ULTRAM) tablet 50 mg, 50 mg, Oral, TID, Franklni Murrieta MD, 50 mg at 11/01/21 0811    acetaminophen (TYLENOL) tablet 650 mg, 650 mg, Oral, Q4H PRN, Franklin Murrieta MD    aspirin EC tablet 81 mg, 81 mg, Oral, Daily, Franklin Murrieta MD, 81 mg at 11/01/21 0813    carvedilol (COREG) tablet 25 mg, 25 mg, Oral, BID WC, Franklin Murrieta MD, 25 mg at 11/01/21 0813    docusate sodium (COLACE) capsule 100 mg, 100 mg, Oral, Daily, Franklin Murrieta MD, 100 mg at 11/01/21 0813    ferrous sulfate (IRON 325) tablet 325 mg, 325 mg, Oral, Daily with breakfast, Franklin Murrieta MD, 325 mg at 11/01/21 0813    fluticasone (FLONASE) 50 MCG/ACT nasal spray 1 spray, 1 spray, Each Nostril, Daily PRN, Franklin Murrieta MD    folic acid (FOLVITE) tablet 1 mg, 1 mg, Oral, Daily, Franklin Murrieta MD, 1 mg at 11/01/21 0812    furosemide (LASIX) tablet 40 mg, 40 mg, Oral, Daily, Franklin Murrieta MD, 40 mg at 11/01/21 0812    isosorbide mononitrate (IMDUR) extended release tablet 60 mg, 60 mg, Oral, BID, Franklin Murrieta MD, 60 mg at 11/01/21 0813    levothyroxine (SYNTHROID) tablet 75 mcg, 75 mcg, Oral, Daily, Franklin Murrieta MD, 75 mcg at 11/01/21 0546    lidocaine 4 % external patch 1 patch, 1 patch, TransDERmal, Daily, Perry Giraldo MD, 1 patch at 10/31/21 0748    magnesium oxide (MAG-OX) tablet 400 mg, 400 mg, Oral, Daily, Perry Giraldo MD, 400 mg at 11/01/21 3945    therapeutic multivitamin-minerals 1 tablet, 1 tablet, Oral, Daily, Perry Giraldo MD, 1 tablet at 11/01/21 0813    naloxegol (MOVANTIK) tablet 25 mg, 25 mg, Oral, QAM, Perry Giraldo MD, 25 mg at 11/01/21 0812    nitroGLYCERIN (NITROSTAT) SL tablet 0.4 mg, 0.4 mg, SubLINGual, Q5 Min PRN, Perry Giraldo MD    pantoprazole (PROTONIX) tablet 40 mg, 40 mg, Oral, Daily, Perry Giraldo MD, 40 mg at 11/01/21 0183    potassium chloride (KLOR-CON M) extended release tablet 20 mEq, 20 mEq, Oral, BID, Perry Giraldo MD, 20 mEq at 11/01/21 0815    rivaroxaban (XARELTO) tablet 20 mg, 20 mg, Oral, Daily, Perry Giraldo MD, 20 mg at 11/01/21 0813    salsalate (DISALCID) tablet 500 mg, 500 mg, Oral, Daily, Perry Giraldo MD    sucralfate (CARAFATE) tablet 1 g, 1 g, Oral, 4x Daily PC & HS, Perry Giraldo MD, 1 g at 11/01/21 0812    valACYclovir (VALTREX) tablet 500 mg, 500 mg, Oral, Daily, Perry Giraldo MD, 500 mg at 11/01/21 1977    thiamine mononitrate tablet 250 mg, 250 mg, Oral, Daily, Perry Giraldo MD, 250 mg at 11/01/21 5704    acetaminophen (TYLENOL) tablet 650 mg, 650 mg, Oral, Q4H PRN, Perry Giraldo MD, 650 mg at 10/31/21 1145    polyethylene glycol (GLYCOLAX) packet 17 g, 17 g, Oral, Daily PRN, Perry Giraldo MD    ceFAZolin (ANCEF) 2000 mg in dextrose 4 % 100 mL IVPB (premix), 2,000 mg, IntraVENous, Q8H, Perry Giraldo MD, Stopped at 11/01/21 0708    lactobacillus (CULTURELLE) capsule 1 capsule, 1 capsule, Oral, Daily, Perry Giraldo MD, 1 capsule at 11/01/21 0812    sodium chloride flush 0.9 % injection 5-40 mL, 5-40 mL, IntraVENous, BID, Perry Giraldo MD, 10 mL at 11/01/21 0816    sodium chloride flush 0.9 % injection 5-40 mL, 5-40 mL, IntraVENous, PRN, Perry Giraldo MD, 10 mL at 10/31/21 1407    Allergies:  Amoxicillin, Lipitor, Niaspan [niacin er], Penicillins, Relafen [nabumetone], and Zocor [simvastatin]    Social History:   TOBACCO:   reports that she has never smoked. She has never used smokeless tobacco.  ETOH:   reports no history of alcohol use. Family History:       Problem Relation Age of Onset    Cancer Mother         LUNG CA    Heart Attack Father 39        FATAL MI         PHYSICAL EXAM:  BP (!) 140/80   Pulse 80   Temp 96.4 °F (35.8 °C) (Temporal)   Resp 18   Ht 5' 6\" (1.676 m)   Wt 164 lb (74.4 kg)   SpO2 99%   BMI 26.47 kg/m²       Constitutional - well developed, well nourished. Eyes - conjunctiva normal.   Ear, nose, throat - No scars, masses, or lesions over external nose or ears, no atrophy of tongue  Neck-symmetric, no masses noted, no jugular vein distension  Respiration- chest wall appears symmetric, good expansion,   normal effort without use of accessory muscles  Musculoskeletal - no significant wasting of muscles noted, no bony deformities  Extremities-no clubbing, cyanosis or edema  Skin - warm, dry, and intact. No rash, erythema, or pallor. Psychiatric - mood, affect, and behavior appear normal.      Neurological exam  Awake, alert, fluent oriented appropriate affect  Attention and concentration appear appropriate  Recent and remote memory appears unremarkable  Speech normal without dysarthria  No clear issues with language of fund of knowledge     Cranial Nerve Exam     CN III, IV,VI-EOMI, No nystagmus, conjugate eye movements, no ptosis    CN VII-no facial assymetry       Motor Exam  antigravity throughout upper extremities bilaterally      Tremors- no tremors in hands or head noted     Gait  Not tested      Nursing/pcp notes, imaging,labs and vitals reviewed.      PT,OT and/or speech notes reviewed    Lab Results   Component Value Date    WBC 6.5 11/01/2021    HGB 9.5 (L) 11/01/2021    HCT 30.0 (L) 11/01/2021    MCV 86.5 11/01/2021     11/01/2021 Lab Results   Component Value Date     (L) 11/01/2021    K 5.0 11/01/2021    CL 95 (L) 11/01/2021    CO2 29 11/01/2021    BUN 29 (H) 11/01/2021    CREATININE 0.7 11/01/2021    GLUCOSE 112 (H) 11/01/2021    CALCIUM 8.8 11/01/2021    PROT 7.4 11/01/2021    LABALBU 2.7 (L) 11/01/2021    BILITOT 0.7 11/01/2021    ALKPHOS 140 (H) 11/01/2021    AST 18 11/01/2021    ALT <5 (A) 11/01/2021    LABGLOM >60 11/01/2021    GFRAA >59 11/01/2021     Lab Results   Component Value Date    INR 1.50 (H) 10/28/2021    INR 1.20 (H) 12/05/2018    INR 1.14 09/04/2018    PROTIME 18.2 (H) 10/28/2021    PROTIME 15.2 (H) 12/05/2018    PROTIME 14.5 09/04/2018         Physical Therapist   Physical Therapy   Progress Notes      Signed   Date of Service:  10/30/2021  2:22 PM               Signed             Show:Clear all  []Manual[x]Template[]Copied    Added by:  [x]Du Reynolds PT    []Shiela for details       10/30/21 9320   Restrictions/Precautions   Restrictions/Precautions Weight Bearing; Fall Risk   Required Braces or Orthoses? Yes   Implants present? Pacemaker   Lower Extremity Weight Bearing Restrictions   Right Lower Extremity Weight Bearing Weight Bearing As Tolerated   Left Lower Extremity Weight Bearing Weight Bearing As Tolerated   Required Braces or Orthoses   Spinal Thoracic Lumbar Sacral Orthotics   Position Activity Restriction   Spinal Precautions No Bending; No Lifting; No Twisting   Pain Screening   Patient Currently in Pain Yes  (nsg came into room during tx to check on meds)   Pain Assessment   Pain Assessment 0-10   Pain Level 8   Pain Type Acute pain;Surgical pain   Pain Location Hip;Back;Groin   Pain Orientation Right   Pain Descriptors Discomfort   Pain Frequency Continuous  (worsened by movement)   Functional Pain Assessment Prevents or interferes with all active and some passive activities   Non-Pharmaceutical Pain Intervention(s) Repositioned; Ambulation/Increased Activity; Distraction   Response to Pain Enoc

## 2021-11-01 NOTE — PROGRESS NOTES
Occupational Therapy  Facility/Department: [facility] 8 REHAB UNIT  Daily Treatment Note  NAME: Ludin Dias  : 1955  MRN: [id number]    Date of Service: 2021    Discharge Recommendations:  Continue to assess pending progress       Assessment   Performance deficits / Impairments: Decreased functional mobility ; Decreased ADL status; Decreased strength;Decreased high-level IADLs;Decreased balance;Decreased endurance;Decreased cognition  Treatment Diagnosis: KYPHON Balloon Kyphoplasty at T12 on 10/26/2021, R hip wound,MSSA Bactermia/status post myocutaneous and fasciocutaneous flap advancements for coverage of a chronic right hip wound/s/p I&D of right hip wound/s/p kyphoplasty  Activity Tolerance  Activity Tolerance: Patient limited by pain  Activity Tolerance: Pt could not tolerate sitting EOB and would just lie back down. Max encouragement, but then patient agreed to bed exercises. Safety Devices  Safety Devices in place: Yes  Type of devices: Call light within reach; Bed alarm in place; Left in bed         Patient Diagnosis(es): There were no encounter diagnoses. has a past medical history of CAD (coronary artery disease), Cellulitis, History of blood transfusion, History of DVT (deep vein thrombosis), History of neutropenia, History of pulmonary fibrosis, Hx of blood clots, Hypercholesterolemia, Hypertension, Intrinsic asthma, Pacemaker, Post op infection, Rheumatoid arteritis ([address] Sinus node dysfunction ([address] Staph aureus infection, Status post placement of implantable loop recorder, Syncope, and Thyroid disease. has a past surgical history that includes Diagnostic Cardiac Cath Lab Procedure; LEEP; Coronary angioplasty with stent (); Pacemaker insertion (2016); back surgery (2018); pr total knee arthroplasty (Right, 2018); pr incis/drain thigh/knee abscess,deep (Right, 10/23/2018); Cardiac catheterization (14  MDL);  Cardiac catheterization (1/26/15  MDL); pacemaker placement; joint replacement; and Revision total knee arthroplasty (Right, 12/6/2018). Restrictions  Restrictions/Precautions  Restrictions/Precautions: Weight Bearing, Fall Risk  Required Braces or Orthoses?: Yes  Implants present? : Pacemaker  Lower Extremity Weight Bearing Restrictions  Right Lower Extremity Weight Bearing: Weight Bearing As Tolerated  Left Lower Extremity Weight Bearing: Weight Bearing As Tolerated  Required Braces or Orthoses  Spinal: Thoracic Lumbar Sacral Orthotics  Position Activity Restriction  Spinal Precautions: No Bending, No Lifting, No Twisting  Subjective   General  Patient assessed for rehabilitation services?: Yes  Pain Assessment  Pain Assessment: 0-10  Pain Level: 10  Pain Location: Back  Pain Orientation: Mid  Pain Descriptors: Aching  Vital Signs  Patient Currently in Pain: Yes        Objective    Balance  Sitting Balance: Moderate assistance     Transfers  Sit to stand: Dependent/Total  Stand to sit: Dependent/Total  Transfer Comments: Max A x2 with Juan F ko           Type of ROM/Therapeutic Exercise  Type of ROM/Therapeutic Exercise: AAROM  Comment: Max VC/Tactile +Max encouragement to complete AAROM exercises, 2 sets of 10 in all 3 planes. Moderate-Severe cognitive issues impeding the ability to complete exercises independently. Plan   Plan  Specific instructions for Next Treatment: Feeding session up in wheelchair or recliner.   Current Treatment Recommendations: Strengthening, Balance Training, Functional Mobility Training, Endurance Training, Safety Education & Training, Patient/Caregiver Education & Training, Equipment Evaluation, Education, & procurement, Self-Care / ADL, Home Management Training       OutComes Score       11/01/21 1345   Oral Hygiene   Assistance Needed Supervision or touching assistance   Comment VC   CARE Score 4   Lower Body Dressing   Assistance Needed Dependent   Comment Supine   CARE Score 1       Goals  Short term goals  Time Frame for Short term goals: 1 week  Short term goal 1: Max A with clothing management/hygiene for toileting. Short term goal 2: Max A with toilet transfers. Short term goal 3: Max A with bathing hygiene. Short term goal 4: Max A with LB dressing using AE. Short term goal 5: Min A with UB dressing. Short term goal 6: Pt will set at EOB to complete self care tasks, requiring supervision with sitting balance. Long term goals  Time Frame for Long term goals : 2 week  Long term goal 1: Mod A with clothing management/hygiene for toileting. Long term goal 2: Mod A with toilet transfers. Long term goal 3: Mod A with bathing hygiene. Long term goal 4: Mod A with LB dressing using AE. Long term goal 5: Pt will verbalize DME needs. Long term goals 6: Supervision with UB dressing. Patient Goals   Patient goals : Return home independently. Therapy Time   Individual Concurrent Group Co-treatment   Time In [id number]   Time Out [phone] (Co treated with PT for safety due to x2 for transfers. )   Minutes 15     45   Timed Code Treatment Minutes: [facility],

## 2021-11-01 NOTE — PLAN OF CARE
Nutrition Problem #1: Severe malnutrition, In context of chronic illness  Intervention: Food and/or Nutrient Delivery: Continue Current Diet, Continue Oral Nutrition Supplement  Nutritional Goals: Po intake 50% or greater of meals and ONS. wound healing.

## 2021-11-01 NOTE — PROGRESS NOTES
Occupational Therapy  Facility/Department: [facility] 8 REHAB UNIT  Daily Treatment Note  NAME: Mick Hay  : 1955  MRN: [id number]    Date of Service: 2021    Discharge Recommendations:  Continue to assess pending progress       Assessment   Performance deficits / Impairments: Decreased functional mobility ; Decreased ADL status; Decreased strength;Decreased high-level IADLs;Decreased balance;Decreased endurance;Decreased cognition  Treatment Diagnosis: KYPHON Balloon Kyphoplasty at T12 on 10/26/2021, R hip wound,MSSA Bactermia/status post myocutaneous and fasciocutaneous flap advancements for coverage of a chronic right hip wound/s/p I&D of right hip wound/s/p kyphoplasty  Activity Tolerance  Activity Tolerance: Patient limited by pain  Safety Devices  Safety Devices in place: Yes  Type of devices: Call light within reach; Bed alarm in place; Left in bed         Patient Diagnosis(es): There were no encounter diagnoses. has a past medical history of CAD (coronary artery disease), Cellulitis, History of blood transfusion, History of DVT (deep vein thrombosis), History of neutropenia, History of pulmonary fibrosis, Hx of blood clots, Hypercholesterolemia, Hypertension, Intrinsic asthma, Pacemaker, Post op infection, Rheumatoid arteritis ([address] Sinus node dysfunction ([address] Staph aureus infection, Status post placement of implantable loop recorder, Syncope, and Thyroid disease. has a past surgical history that includes Diagnostic Cardiac Cath Lab Procedure; LEEP; Coronary angioplasty with stent (); Pacemaker insertion (2016); back surgery (2018); pr total knee arthroplasty (Right, 2018); pr incis/drain thigh/knee abscess,deep (Right, 10/23/2018); Cardiac catheterization (14  MDL); Cardiac catheterization (1/26/15  MDL); pacemaker placement; joint replacement; and Revision total knee arthroplasty (Right, 2018).     Restrictions  Restrictions/Precautions  Restrictions/Precautions: Weight Bearing, Fall Risk  Required Braces or Orthoses?: Yes  Implants present? : Pacemaker  Lower Extremity Weight Bearing Restrictions  Right Lower Extremity Weight Bearing: Weight Bearing As Tolerated  Left Lower Extremity Weight Bearing: Weight Bearing As Tolerated  Required Braces or Orthoses  Spinal: Thoracic Lumbar Sacral Orthotics  Position Activity Restriction  Spinal Precautions: No Bending, No Lifting, No Twisting       Objective    Balance  Sitting Balance: Minimal assistance (Min A during during one handed task, CGA with static sitting with no task)     Transfers  Sit to stand: Dependent/Total  Stand to sit: Dependent/Total  Transfer Comments: Max A x2 with Suly Penn           Plan   Plan  Current Treatment Recommendations: Strengthening, Balance Training, Functional Mobility Training, Endurance Training, Safety Education & Training, Patient/Caregiver Education & Training, Equipment Evaluation, Education, & procurement, Self-Care / ADL, Home Management Training       OutComes Score       11/01/21 1100   Eating   Assistance Needed Supervision or touching assistance   Comment Very fatigued and ate about 15% of meal   CARE Score 4   [address] Score 1   Lower Body Dressing   Assistance Needed Dependent   Comment Supine   CARE Score 1   Putting On/Taking Off Footwear   Assistance Needed Dependent   Comment Supine   CARE Score 1     Goals  Short term goals  Time Frame for Short term goals: 1 week  Short term goal 1: Max A with clothing management/hygiene for toileting. Short term goal 2: Max A with toilet transfers. Short term goal 3: Max A with bathing hygiene. Short term goal 4: Max A with LB dressing using AE. Short term goal 5: Min A with UB dressing. Short term goal 6: Pt will set at EOB to complete self care tasks, requiring supervision with sitting balance.   Long term goals  Time Frame for Long term goals : 2 week  Long term goal 1: Mod A with clothing management/hygiene for toileting. Long term goal 2: Mod A with toilet transfers. Long term goal 3: Mod A with bathing hygiene. Long term goal 4: Mod A with LB dressing using AE. Long term goal 5: Pt will verbalize DME needs. Long term goals 6: Supervision with UB dressing. Patient Goals   Patient goals : Return home independently. Therapy Time   Individual Concurrent Group Co-treatment   Time In 1200     1100   Time Out 1215     1200 (Co treated with PT for safety due to x2 for transfers. )   Minutes 15     60   Timed Code Treatment Minutes: 7[address] [facility], OT

## 2021-11-01 NOTE — PROGRESS NOTES
Have attempted multiple times to change dressing to right hip throughout the day but patient refused. Then received call from Dr. Blue Lan office and talked Michael Barber and informed the dressing to be changed and that Georgina Hanna verified this. Patient was then willing to allow a dressing change to right hip to be completed.

## 2021-11-01 NOTE — PATIENT CARE CONFERENCE
[facility] ACUTE INPATIENT REHABILITATION  TEAM CONFERENCE NOTE    Date: 2021  Patient Name: Kaity Garcia        MRN: [id number]    : 1955  (74 y.o.)  Gender: female      Diagnosis: T12 compression fx s/p kyphoplasty, I&D right hip       PHYSICAL THERAPY  STRENGTH  Strength RLE  Comment: grossly 2-/5  Strength LLE  Comment: grossly 2-/5  ROM  AROM RLE (degrees)  RLE General AROM: pt has difficulty moving BLE on command  AROM LLE (degrees)  LLE General AROM: pt has diifficulty actively moving BLE on command   BED MOBILITY  Bed Mobility  Rolling: Maximal assistance, Moderate assistance (x2)  Supine to Sit: Maximal assistance (to initate roll and manage trunk/BLE)  Sit to Supine: Maximal assistance (x2 to manage trunk and BLE)  Scooting: Dependent/Total (x2 pad to adjust in bed )  Comment: pt requires maxAx1 to initiate log roll to right and to bring trunk and   TRANSFERS  Transfers  Sit to Stand: Dependent/Total (with steady)  Stand to sit: Dependent/Total  Bed to Chair: Dependent/Total (kamlesh steady )  Comment: lack of BLe participation during  steady, frequent cues to keep hands on handle bar vs her forearm to assist w/stand.    WHEELCHAIR PROPULSION     AMBULATION     STAIRS     GOALS:  Short term goals  Time Frame for Short term goals: 1-2 weeks  Short term goal 1: supine<>sit ModA  Short term goal 2: functional transfers w/FWW ModA  Short term goal 3: ambulate w/FWW w/ModA 25 ft   Short term goal 4: WC mobility Nemes[place] 25 ft    Long term goals  Time Frame for Long term goals : 3-4 weeks  Long term goal 1: bed mobiity Nemesio  Long term goal 2: functional transfers Nemesio w/FWW  Long term goal 3: ambulate w/FWW Min A 50 ft   Long term goal 4: WC mobilit SBA 50 ft     ASSESSMENT:  Assessment: pt demonstrates    SPEECH THERAPY      OCCUPATIONAL THERAPY    CURRENT IRF-CAT SCORES  Eating: CARE Score: 4  Comment: Very fatigued and ate about 15% of meal    Oral Hygiene: CARE Score: 4  Comment: VC Toileting: CARE Score: 1  Comment: Cleanup post urinary incontinence, supine in bed      Shower/Bathe: CARE Score: 3  Comment: Mod A, requires encouragement to complete portions of task independently. Upper Body Dressing: CARE Score: 3  Comment: Sitting EOB, Mod A for balance, required encouragement to attempt portions of task independently. Lower Body Dressing: CARE Score: 1  Comment: Supine       Footwear: CARE Score: 1  Comment: Supine       Toilet Transfers: CARE Score: 88          Picking Up Object:  CARE Score: 88          UE Functioning:      Pain Assessment:  Pain Level: 10  Pain Location: Back    STGs:  Short term goals  Time Frame for Short term goals: 1 week  Short term goal 1: Max A with clothing management/hygiene for toileting. Short term goal 2: Max A with toilet transfers. Short term goal 3: Max A with bathing hygiene. Short term goal 4: Max A with LB dressing using AE. Short term goal 5: Min A with UB dressing. Short term goal 6: Pt will set at EOB to complete self care tasks, requiring supervision with sitting balance. LTGs:  Long term goals  Time Frame for Long term goals : 2 week  Long term goal 1: Mod A with clothing management/hygiene for toileting. Long term goal 2: Mod A with toilet transfers. Long term goal 3: Mod A with bathing hygiene. Long term goal 4: Mod A with LB dressing using AE. Long term goal 5: Pt will verbalize DME needs. Long term goals 6: Supervision with UB dressing. Assessment:  Performance deficits / Impairments: Decreased functional mobility , Decreased ADL status, Decreased strength, Decreased high-level IADLs, Decreased balance, Decreased endurance, Decreased cognition                NUTRITION  Current Wt: Weight: 164 lb (74.4 kg) / Body mass index is 26.47 kg/m².   Admission Wt: Admission Body Weight: 164 lb (74.4 kg)  Oral Diet Orders: Regular  Oral Nutrition Supplement (ONS) Orders: Ensure Plus TID, Ramiro BID    Pt remains malnourished AEB poor po intake of meals, <50%. Notified by OT for plans to do a feeding therapy session to address self-feeding difficulties/fatigue. Continue current ONS regimen at this time. Please see nutrition note for details. NURSING    Wounds/Incisions/Ulcers: R hip inapprox area packing drsg. T12 back incision. Varinder Scale Score: 15    Pain: Patient's pain is currently controlled with Oxy IR prn. Consultations/Labs/X-rays: Dr. Chen Right. Groin xray (-). Family Education: Family available and participating in education     Fall Risk:  Isaura Salinas Score: 76    Fall in the last week?no      Other Nursing Issues: Right midline. Poor mobility. Xarelto. LSO when up. BM 28. Incont B/B. Purewick HS. Ancef IV. Pills whole. Pacemaker. Reg diet. SOCIAL WORK/CASE MANAGEMENT  Assessment:Has had medical care in/out of hospital/rehab/snf/homecare/hospital since January; most recently in handicap accessible home with hired care and support from her sister and son, Dr. Barillaselsa Lopezs. Discharge Plan   Estimated Length of Stay: [facility] date 11/12/21; Admission orders included IV antibiotic for 2 weeks and follow up appointment with Dr. Vernon Waddell on 11/11; Dr. Joyce Pascal progress note indicate IV cefazolin for duration of 4 weeks now    Destination: Expected to be home with caregiver, home health care    Pass: No    Services at Discharge: [id number] and Nursing per evaluations    Equipment at Discharge: to be determined    Progress made in the prior week:  1. Recent eval, no prior staffing  2.  3.  4.  5.      Goals for following week:  1. Complete self care tasks sitting EOB  2.  Ind with self-feeding  3.   4.   5.     Factors facilitating achievement of predicted outcomes: Family support    Barriers to the achievement of predicted outcomes: Pain, Cognitive deficit and Decreased endurance    Team Members Present at Conference:  : Richard Dickinson   Occupational Therapist: Josselin Esquivel OTR/L  Physical Therapist: Minus Sprain PT,DPT  Speech Therapist: Ramona Rodriguez [address], [id number] [facility]  Nurse: Bari Silveira, RN,BSN   Nurse Manager:  Bari Silveira, RN, BSN  Dietitian:  Octavio Henson, MS, RD, LD  Rehab Director:  Peri Glass approve the established interdisciplinary plan of care as documented within the medical record of [facility].

## 2021-11-02 PROCEDURE — 92522 EVALUATE SPEECH PRODUCTION: CPT

## 2021-11-02 PROCEDURE — 97535 SELF CARE MNGMENT TRAINING: CPT

## 2021-11-02 PROCEDURE — 1180000000 HC REHAB R&B

## 2021-11-02 PROCEDURE — 97110 THERAPEUTIC EXERCISES: CPT

## 2021-11-02 PROCEDURE — 2580000003 HC RX 258: Performed by: PSYCHIATRY & NEUROLOGY

## 2021-11-02 PROCEDURE — 97116 GAIT TRAINING THERAPY: CPT

## 2021-11-02 PROCEDURE — 97530 THERAPEUTIC ACTIVITIES: CPT

## 2021-11-02 PROCEDURE — 6360000002 HC RX W HCPCS: Performed by: PSYCHIATRY & NEUROLOGY

## 2021-11-02 PROCEDURE — 99233 SBSQ HOSP IP/OBS HIGH 50: CPT | Performed by: PSYCHIATRY & NEUROLOGY

## 2021-11-02 PROCEDURE — 6370000000 HC RX 637 (ALT 250 FOR IP): Performed by: PSYCHIATRY & NEUROLOGY

## 2021-11-02 RX ORDER — OXYCODONE AND ACETAMINOPHEN 7.5; 325 MG/1; MG/1
1 TABLET ORAL EVERY 4 HOURS PRN
Status: DISCONTINUED | OUTPATIENT
Start: 2021-11-02 | End: 2021-11-03

## 2021-11-02 RX ORDER — POLYETHYLENE GLYCOL 3350 17 G/17G
17 POWDER, FOR SOLUTION ORAL DAILY
Status: DISCONTINUED | OUTPATIENT
Start: 2021-11-02 | End: 2021-11-03

## 2021-11-02 RX ADMIN — CEFAZOLIN SODIUM 2000 MG: 2 INJECTION, SOLUTION INTRAVENOUS at 05:18

## 2021-11-02 RX ADMIN — SUCRALFATE 1 G: 1 TABLET ORAL at 20:08

## 2021-11-02 RX ADMIN — SUCRALFATE 1 G: 1 TABLET ORAL at 17:04

## 2021-11-02 RX ADMIN — THIAMINE HCL TAB 100 MG 250 MG: 100 TAB at 09:50

## 2021-11-02 RX ADMIN — SODIUM CHLORIDE, PRESERVATIVE FREE 10 ML: 5 INJECTION INTRAVENOUS at 09:52

## 2021-11-02 RX ADMIN — SODIUM CHLORIDE, PRESERVATIVE FREE 10 ML: 5 INJECTION INTRAVENOUS at 20:09

## 2021-11-02 RX ADMIN — ISOSORBIDE MONONITRATE 60 MG: 60 TABLET, EXTENDED RELEASE ORAL at 20:08

## 2021-11-02 RX ADMIN — FOLIC ACID 1 MG: 1 TABLET ORAL at 09:49

## 2021-11-02 RX ADMIN — CARVEDILOL 25 MG: 25 TABLET, FILM COATED ORAL at 09:50

## 2021-11-02 RX ADMIN — MAGNESIUM OXIDE TAB 400 MG (240 MG ELEMENTAL MG) 400 MG: 400 (240 MG) TAB at 09:50

## 2021-11-02 RX ADMIN — Medication 1 CAPSULE: at 09:49

## 2021-11-02 RX ADMIN — SUCRALFATE 1 G: 1 TABLET ORAL at 09:50

## 2021-11-02 RX ADMIN — POTASSIUM CHLORIDE 20 MEQ: 1500 TABLET, EXTENDED RELEASE ORAL at 09:49

## 2021-11-02 RX ADMIN — PANTOPRAZOLE SODIUM 40 MG: 40 TABLET, DELAYED RELEASE ORAL at 09:49

## 2021-11-02 RX ADMIN — ISOSORBIDE MONONITRATE 60 MG: 60 TABLET, EXTENDED RELEASE ORAL at 09:49

## 2021-11-02 RX ADMIN — OXYCODONE HYDROCHLORIDE AND ACETAMINOPHEN 1 TABLET: 7.5; 325 TABLET ORAL at 20:08

## 2021-11-02 RX ADMIN — SODIUM CHLORIDE, PRESERVATIVE FREE 10 ML: 5 INJECTION INTRAVENOUS at 14:13

## 2021-11-02 RX ADMIN — CARVEDILOL 25 MG: 25 TABLET, FILM COATED ORAL at 17:04

## 2021-11-02 RX ADMIN — FUROSEMIDE 40 MG: 40 TABLET ORAL at 09:50

## 2021-11-02 RX ADMIN — DOCUSATE SODIUM 100 MG: 100 CAPSULE ORAL at 09:50

## 2021-11-02 RX ADMIN — RIVAROXABAN 20 MG: 20 TABLET, FILM COATED ORAL at 09:50

## 2021-11-02 RX ADMIN — POTASSIUM CHLORIDE 20 MEQ: 1500 TABLET, EXTENDED RELEASE ORAL at 20:08

## 2021-11-02 RX ADMIN — CEFAZOLIN SODIUM 2000 MG: 2 INJECTION, SOLUTION INTRAVENOUS at 14:23

## 2021-11-02 RX ADMIN — ASPIRIN 81 MG: 81 TABLET, COATED ORAL at 09:51

## 2021-11-02 RX ADMIN — FERROUS SULFATE TAB 325 MG (65 MG ELEMENTAL FE) 325 MG: 325 (65 FE) TAB at 09:49

## 2021-11-02 RX ADMIN — SUCRALFATE 1 G: 1 TABLET ORAL at 13:57

## 2021-11-02 RX ADMIN — LISINOPRIL 10 MG: 10 TABLET ORAL at 09:51

## 2021-11-02 RX ADMIN — VALACYCLOVIR HYDROCHLORIDE 500 MG: 500 TABLET, FILM COATED ORAL at 09:51

## 2021-11-02 RX ADMIN — NALOXEGOL OXALATE 25 MG: 12.5 TABLET, FILM COATED ORAL at 09:51

## 2021-11-02 RX ADMIN — LEVOTHYROXINE SODIUM 75 MCG: 75 TABLET ORAL at 05:18

## 2021-11-02 RX ADMIN — CEFAZOLIN SODIUM 2000 MG: 2 INJECTION, SOLUTION INTRAVENOUS at 20:08

## 2021-11-02 RX ADMIN — Medication 1 TABLET: at 09:50

## 2021-11-02 ASSESSMENT — PAIN DESCRIPTION - PAIN TYPE
TYPE: SURGICAL PAIN
TYPE: SURGICAL PAIN

## 2021-11-02 ASSESSMENT — PAIN DESCRIPTION - FREQUENCY
FREQUENCY: CONTINUOUS
FREQUENCY: CONTINUOUS

## 2021-11-02 ASSESSMENT — PAIN DESCRIPTION - ORIENTATION
ORIENTATION: LOWER
ORIENTATION: LOWER

## 2021-11-02 ASSESSMENT — PAIN DESCRIPTION - DESCRIPTORS
DESCRIPTORS: ACHING
DESCRIPTORS: ACHING

## 2021-11-02 ASSESSMENT — PAIN SCALES - GENERAL
PAINLEVEL_OUTOF10: 10
PAINLEVEL_OUTOF10: 8
PAINLEVEL_OUTOF10: 0

## 2021-11-02 ASSESSMENT — PAIN - FUNCTIONAL ASSESSMENT
PAIN_FUNCTIONAL_ASSESSMENT: PREVENTS OR INTERFERES WITH MANY ACTIVE NOT PASSIVE ACTIVITIES
PAIN_FUNCTIONAL_ASSESSMENT: PREVENTS OR INTERFERES SOME ACTIVE ACTIVITIES AND ADLS

## 2021-11-02 ASSESSMENT — PAIN DESCRIPTION - ONSET
ONSET: ON-GOING
ONSET: ON-GOING

## 2021-11-02 ASSESSMENT — PAIN DESCRIPTION - PROGRESSION
CLINICAL_PROGRESSION: NOT CHANGED

## 2021-11-02 ASSESSMENT — PAIN DESCRIPTION - LOCATION
LOCATION: BACK
LOCATION: BACK

## 2021-11-02 NOTE — PROGRESS NOTES
Speech Language Pathology  Facility/Department: [facility] 8 REHAB UNIT  Initial Speech/Language/Cognitive Assessment    NAME: Trace Long  : 1955   MRN: [id number]  ADMISSION DATE: 10/28/2021   Date of Eval: 2021   Evaluating Therapist: Larry Fonseca MS CCC-SLP        ADMITTING DIAGNOSIS:  has Coronary artery disease of native artery of native heart with stable angina pectoris ([address] Hypertension; Rheumatoid arteritis ([address] History of pulmonary fibrosis; History of DVT (deep vein thrombosis); Thyroid disease; Intrinsic asthma; Hypercholesterolemia; Syncope, cardiogenic; Near syncope; Status post placement of implantable loop recorder; Left carotid bruit; S/P coronary artery stent placement; Chest pain; Pacemaker; Sinus node dysfunction ([address] Lumbar stenosis with neurogenic claudication; Spondylolisthesis of lumbar region; Leg swelling; Primary osteoarthritis of right knee; Infection of total right knee replacement ([address] Myalgia due to statin; Abnormal stress test; Discitis; MSSA bacteremia; Acute cystitis without hematuria; Closed wedge compression fracture of T2 vertebra with routine healing; S/P kyphoplasty; Acute midline thoracic back pain; Anemia; and Severe malnutrition (HCC) on their problem list.      Pain:  Pain Assessment  Pain Assessment: 0-10  Pain Level: 0  Patient's Stated Pain Goal: No pain  Pain Type: Surgical pain  Pain Location: Back  Pain Orientation: Mid  Pain Descriptors: Aching  Pain Frequency: Continuous  Pain Onset: On-going  Clinical Progression: Not changed  Functional Pain Assessment: Prevents or interferes some active activities and ADLs  Non-Pharmaceutical Pain Intervention(s): Rest  Response to Pain Intervention: Patient Satisfied  Multiple Pain Sites: No    Assessment:  The patient exhibited deficits in receptive language, expressive language, and cognition. She is recommended to receive speech therapy services 1x/day, 5x/week for 2-3 weeks.     Recommendations:  Plan: Goals:  Short-term Goals  Goal 1: The patient will follow 2 step commands related to functional living environment with 100% accuracyand (min) cues in order to increase functional integration into environment. Goal 2: The patient will recite automatic speech tasks given (min) cues. Goal 3: The patient will provide 15 members in a given category with 100% accuracy and (min) cues in the form of a visual aid, semantic/phonemic cueing, etc.  Goal 4: The patient will demonstrate functional problem solving and safety awareness with 100% verbal,tactile and visual cues for daily living tasks in order to increase safe interaction with environment and decreased assistance from caregivers. Goal 5: The patient will demonstrate recall of functional information following a/an (immediate, short term,long-term) delay with min cues in order to increase functional integration into environment. Long-term Goals  Goal 1: The patient will comprehend communication related to basic medical and social needs and utilizecompensatory strategies to maintain safety and participate socially in functional living environment. Goal 2: The patient will develop functional, cognitive-linguistic-based skills and utilize compensatory strategies tocommunicate wants and needs effectively to different conversational partners, maintain safety and participate socially in functional living environment  Goal 3: The patient will demonstrate functional problem solving skills and provide appropriate solutions to problemsin order to improve safety and awareness in functional living environment. Patient/family involved in developing goals and treatment plan:yes    Subjective:  General  Chart Reviewed: Yes  Patient assessed for rehabilitation services?: Yes  Family / Caregiver Present: No          Objective: Auditory Comprehension  Yes/No Questions: Severe  Basic Questions:  Moderate  One Step Basic Commands: Mild  Two Step Basic Commands: Severe  Common Objects: Mild  Pictures: Mild  L/R Discrimination: Mild  Conversation: Moderate    Reading Comprehension  Reading Status:  (Will assess during therapy sessions)    Expression  Primary Mode of Expression: Verbal    Verbal Expression  Initiation: Moderate  Repetition: Moderate  Automatic Speech: Moderate (Numerous cues were provided for her to initate and complete the task.)  Confrontation: Mild  Convergent: Severe  Divergent: Severe  Conversation: Moderate         Motor Speech  Motor Speech: Within Functional Limits    Pragmatics/Social Functioning  Affect: Moderate    Cognition:   Orientation  Overall Orientation Status: Impaired  Orientation Level: Oriented to person;Disoriented to place; Disoriented to time;Disoriented to situation  Memory  Short-term Memory: Severe  Working Memory: Severe  Problem Solving  Problem Solving: Exceptions to [facility]  Managing Finances: Severe  Managing Medications: Severe  Simple Functional Tasks: Severe  Verbal Reasoning Skills: Severe    Additional Assessment:  Portions of the RIPA and WAB were completed. She was not able to complete the SLUMS or CLQT this date. Will attempt these again at a later date. Prognosis:  Good      Education:  Reviewed the evaluation results and plan of care. She was in agreement with the plan of care.               Electronically Signed By:  Beckie Vasquez  11/2/2021,9:57 AM.    11/2/2021 9:56 AM

## 2021-11-02 NOTE — PROGRESS NOTES
11/02/21 1000   Restrictions/Precautions   Restrictions/Precautions Weight Bearing; Fall Risk   Required Braces or Orthoses? Yes   Implants present? Pacemaker   Lower Extremity Weight Bearing Restrictions   Right Lower Extremity Weight Bearing Weight Bearing As Tolerated   Left Lower Extremity Weight Bearing Weight Bearing As Tolerated   Required Braces or Orthoses   Spinal Thoracic Lumbar Sacral Orthotics   Position Activity Restriction   Spinal Precautions No Bending; No Lifting; No Twisting   Pain Screening   Patient Currently in Pain Yes   Pain Assessment   Pain Assessment 0-10   Pain Level 10   Patient's Stated Pain Goal No pain   Pain Type Surgical pain   Pain Location Back   Pain Orientation Lower   Pain Descriptors Aching   Pain Frequency Continuous   Pain Onset On-going   Clinical Progression Not changed   Functional Pain Assessment Prevents or interferes with many active not passive activities   Non-Pharmaceutical Pain Intervention(s) Rest;Repositioned   Bed Mobility   Rolling Moderate assistance   Supine to Sit Maximal assistance   Scooting Maximal assistance  (use pads )   Comment pt rolled x4 to assist with changing brief and pants    Transfers   Bed to Chair Maximum assistance;2 Person Assistance  (sliding board)   Comment slidign board to pts left pt leans forward to hold onto PT, no BLE participation   Exercises   Gluteal Sets x10    Hip Abduction x10 BLE   Knee Short Arc Quad x10 BLE    Ankle Pumps x20 BLE supine   Comments MAX VCS AND TCS, mostly AAROM RLE>LLE    Conditions Requiring Skilled Therapeutic Intervention   Assessment pt has difficulty staying on task with bed level exercises requiring max cues to execute therex and participate. pt did have improved rollling in bed requires hand guiding to the bed rail to hold on to but pt can pull herself modA toassist with dressing.  the tf to the drop arm chair was dependent using sliding board pt unable to bear weight onto BLe to assist. pt up in

## 2021-11-02 NOTE — PROGRESS NOTES
Occupational Therapy  Facility/Department: [facility] 8 REHAB UNIT  Daily Treatment Note  NAME: Duc Espinoza  : 1955  MRN: [id number]    Date of Service: 2021    Discharge Recommendations:  Continue to assess pending progress       Assessment   Performance deficits / Impairments: Decreased functional mobility ; Decreased ADL status; Decreased strength;Decreased high-level IADLs;Decreased balance;Decreased endurance;Decreased cognition  Treatment Diagnosis: KYPHON Balloon Kyphoplasty at T12 on 10/26/2021, R hip wound,MSSA Bactermia/status post myocutaneous and fasciocutaneous flap advancements for coverage of a chronic right hip wound/s/p I&D of right hip wound/s/p kyphoplasty  Activity Tolerance  Activity Tolerance: Patient limited by pain; Patient Tolerated treatment well  Safety Devices  Safety Devices in place: Yes  Type of devices: Left in chair;Call light within reach; Chair alarm in place         Patient Diagnosis(es): There were no encounter diagnoses. has a past medical history of CAD (coronary artery disease), Cellulitis, History of blood transfusion, History of DVT (deep vein thrombosis), History of neutropenia, History of pulmonary fibrosis, Hx of blood clots, Hypercholesterolemia, Hypertension, Intrinsic asthma, Pacemaker, Post op infection, Rheumatoid arteritis ([address] Sinus node dysfunction ([address] Staph aureus infection, Status post placement of implantable loop recorder, Syncope, and Thyroid disease. has a past surgical history that includes Diagnostic Cardiac Cath Lab Procedure; LEEP; Coronary angioplasty with stent (); Pacemaker insertion (2016); back surgery (2018); pr total knee arthroplasty (Right, 2018); pr incis/drain thigh/knee abscess,deep (Right, 10/23/2018); Cardiac catheterization (14  MDL);  Cardiac catheterization (1/26/15  MDL); pacemaker placement; joint replacement; and Revision total knee arthroplasty (Right, 12/6/2018). Restrictions  Restrictions/Precautions  Restrictions/Precautions: Weight Bearing, Fall Risk  Required Braces or Orthoses?: Yes  Implants present? : Pacemaker  Lower Extremity Weight Bearing Restrictions  Right Lower Extremity Weight Bearing: Weight Bearing As Tolerated  Left Lower Extremity Weight Bearing: Weight Bearing As Tolerated  Required Braces or Orthoses  Spinal: Thoracic Lumbar Sacral Orthotics  Position Activity Restriction  Spinal Precautions: No Bending, No Lifting, No Twisting  Objective          Type of ROM/Therapeutic Exercise  Type of ROM/Therapeutic Exercise: AROM  Comment: 2 sets of 10 for shoulder flexion      Plan   Plan  Specific instructions for Next Treatment: Feeding session up in wheelchair or recliner. Current Treatment Recommendations: Strengthening, Balance Training, Functional Mobility Training, Endurance Training, Safety Education & Training, Patient/Caregiver Education & Training, Equipment Evaluation, Education, & procurement, Self-Care / ADL, Home Management Training  OutComes Score       11/02/21 1100   Eating   Assistance Needed Supervision or touching assistance   Comment Able to feed her self with Built up handle, pillow underneath RUE at the beginning, but then required assistance with feeding at end of therapy session because of fatigue and increased pain; 15% of intake   CARE Score 4         Goals  Short term goals  Time Frame for Short term goals: 1 week  Short term goal 1: Max A with clothing management/hygiene for toileting. Short term goal 2: Max A with toilet transfers. Short term goal 3: Max A with bathing hygiene. Short term goal 4: Max A with LB dressing using AE. Short term goal 5: Min A with UB dressing. Short term goal 6: Pt will set at EOB to complete self care tasks, requiring supervision with sitting balance.   Long term goals  Time Frame for Long term goals : 2 week  Long term goal 1: Mod A with clothing management/hygiene for toileting. Long term goal 2: Mod A with toilet transfers. Long term goal 3: Mod A with bathing hygiene. Long term goal 4: Mod A with LB dressing using AE. Long term goal 5: Pt will verbalize DME needs. Long term goals 6: Supervision with UB dressing. Patient Goals   Patient goals : Return home independently.        Therapy Time   Individual Concurrent Group Co-treatment   Time In 1100         Time Out 1200         Minutes 60         Timed Code Treatment Minutes: [address], OT

## 2021-11-02 NOTE — PROGRESS NOTES
11/02/21 4335   Restrictions/Precautions   Restrictions/Precautions Weight Bearing; Fall Risk   Required Braces or Orthoses? Yes   Implants present? Pacemaker   Lower Extremity Weight Bearing Restrictions   Right Lower Extremity Weight Bearing Weight Bearing As Tolerated   Left Lower Extremity Weight Bearing Weight Bearing As Tolerated   Position Activity Restriction   Spinal Precautions No Bending; No Lifting; No Twisting   Bed Mobility   Rolling Moderate assistance   Sit to Supine Maximal assistance  (BLE trunk management )   Exercises   Hip Flexion [id number]   Hip Extension/Leg Presses x10 PROM   Ankle Pumps x10 with calf stretch PROM    Conditions Requiring Skilled Therapeutic Intervention   Assessment pt fatigued from sitting most of morning in recliner, pt attempts to perform LAQ required max cues and only did about 7 on LLE and none on RLE d/t wanting to get back into bed and difficulty follwoing commands. pt required clean up and participated in rolling to asssit with modA to initate roll while pt holds self. will contineueto progress per POC and as tolerated.

## 2021-11-02 NOTE — PLAN OF CARE
Problem: Infection:  Goal: Will remain free from infection  Description: Will remain free from infection  Outcome: Ongoing     Problem: Safety:  Goal: Free from accidental physical injury  Description: Free from accidental physical injury  Outcome: Ongoing  Goal: Free from intentional harm  Description: Free from intentional harm  Outcome: Ongoing     Problem: Daily Care:  Goal: Daily care needs are met  Description: Daily care needs are met  Outcome: Ongoing     Problem: Pain:  Goal: Patient's pain/discomfort is manageable  Description: Patient's pain/discomfort is manageable  Outcome: Ongoing  Goal: Pain level will decrease  Description: Pain level will decrease  Outcome: Ongoing  Goal: Control of acute pain  Description: Control of acute pain  Outcome: Ongoing  Goal: Control of chronic pain  Description: Control of chronic pain  Outcome: Ongoing     Problem: Discharge Planning:  Goal: Patients continuum of care needs are met  Description: Patients continuum of care needs are met  Outcome: Ongoing     Problem: Falls - Risk of:  Goal: Will remain free from falls  Description: Will remain free from falls  Outcome: Ongoing  Goal: Absence of physical injury  Description: Absence of physical injury  Outcome: Ongoing     Problem: Sensory:  Goal: General experience of comfort will improve  Description: General experience of comfort will improve  Outcome: Ongoing     Problem: Urinary Elimination:  Goal: Signs and symptoms of infection will decrease  Description: Signs and symptoms of infection will decrease  Outcome: Ongoing  Goal: Ability to reestablish a normal urinary elimination pattern will improve - after catheter removal  Description: Ability to reestablish a normal urinary elimination pattern will improve  Outcome: Ongoing  Goal: Complications related to the disease process, condition or treatment will be avoided or minimized  Description: Complications related to the disease process, condition or treatment will

## 2021-11-02 NOTE — PROGRESS NOTES
Occupational Therapy  Facility/Department: [facility] 8 REHAB UNIT  Daily Treatment Note  NAME: Jesse Fine  : 1955  MRN: [id number]    Date of Service: 2021    Discharge Recommendations:  Continue to assess pending progress       Assessment   Performance deficits / Impairments: Decreased functional mobility ; Decreased ADL status; Decreased strength;Decreased high-level IADLs;Decreased balance;Decreased endurance;Decreased cognition  Treatment Diagnosis: KYPHON Balloon Kyphoplasty at T12 on 10/26/2021, R hip wound,MSSA Bactermia/status post myocutaneous and fasciocutaneous flap advancements for coverage of a chronic right hip wound/s/p I&D of right hip wound/s/p kyphoplasty  Activity Tolerance  Activity Tolerance: Patient limited by pain; Patient Tolerated treatment well  Safety Devices  Safety Devices in place: Yes  Type of devices: Left in chair;Call light within reach; Chair alarm in place         Patient Diagnosis(es): There were no encounter diagnoses. has a past medical history of CAD (coronary artery disease), Cellulitis, History of blood transfusion, History of DVT (deep vein thrombosis), History of neutropenia, History of pulmonary fibrosis, Hx of blood clots, Hypercholesterolemia, Hypertension, Intrinsic asthma, Pacemaker, Post op infection, Rheumatoid arteritis ([address] Sinus node dysfunction ([address] Staph aureus infection, Status post placement of implantable loop recorder, Syncope, and Thyroid disease. has a past surgical history that includes Diagnostic Cardiac Cath Lab Procedure; LEEP; Coronary angioplasty with stent (); Pacemaker insertion (2016); back surgery (2018); pr total knee arthroplasty (Right, 2018); pr incis/drain thigh/knee abscess,deep (Right, 10/23/2018); Cardiac catheterization (14  MDL);  Cardiac catheterization (1/26/15  MDL); pacemaker placement; joint replacement; and Revision total knee arthroplasty (Right, 12/6/2018). Restrictions  Restrictions/Precautions  Restrictions/Precautions: Weight Bearing, Fall Risk  Required Braces or Orthoses?: Yes  Implants present? : Pacemaker  Lower Extremity Weight Bearing Restrictions  Right Lower Extremity Weight Bearing: Weight Bearing As Tolerated  Left Lower Extremity Weight Bearing: Weight Bearing As Tolerated  Required Braces or Orthoses  Spinal: Thoracic Lumbar Sacral Orthotics  Position Activity Restriction  Spinal Precautions: No Bending, No Lifting, No Twisting     Objective    Balance  Sitting Balance: Minimal assistance  Standing Balance: Dependent/Total (at kamlesh ko, max A x2)     Transfers  Sit to stand: Dependent/Total  Stand to sit: Dependent/Total  Transfer Comments: Max A x2 with Clarita ko           Type of ROM/Therapeutic Exercise  Type of ROM/Therapeutic Exercise: AROM  Comment: 2 sets of 10 for shoulder flexion        Plan   Plan  Specific instructions for Next Treatment: Feeding session up in wheelchair or recliner. Current Treatment Recommendations: Strengthening, Balance Training, Functional Mobility Training, Endurance Training, Safety Education & Training, Patient/Caregiver Education & Training, Equipment Evaluation, Education, & procurement, Self-Care / ADL, Home Management Training       OutComes Score       11/02/21 1400   Toileting Hygiene   Assistance Needed Dependent   CARE Score 1       Goals  Short term goals  Time Frame for Short term goals: 1 week  Short term goal 1: Max A with clothing management/hygiene for toileting. Short term goal 2: Max A with toilet transfers. Short term goal 3: Max A with bathing hygiene. Short term goal 4: Max A with LB dressing using AE. Short term goal 5: Min A with UB dressing. Short term goal 6: Pt will set at EOB to complete self care tasks, requiring supervision with sitting balance.   Long term goals  Time Frame for Long term goals : 2 week  Long term goal 1: Mod A with clothing management/hygiene for toileting. Long term goal 2: Mod A with toilet transfers. Long term goal 3: Mod A with bathing hygiene. Long term goal 4: Mod A with LB dressing using AE. Long term goal 5: Pt will verbalize DME needs. Long term goals 6: Supervision with UB dressing. Patient Goals   Patient goals : Return home independently.        Therapy Time   Individual Concurrent Group Co-treatment   Time In      1400   Time Out      1430 (Co treated due to x2 for transfers for safety.)   Minutes      30   Timed Code Treatment Minutes: Edilson Valentino 95, OT

## 2021-11-02 NOTE — PROGRESS NOTES
Patient:   Odalys Leon  MR#:    [id number]   Room:    0820/820-02   YOB: 1955  Date of Progress Note: 11/2/2021  Time of Note                           8:30 AM  Consulting Physician:   Tahira Johnson M.D. Attending Physician:  Tahira Johnson MD     Chief complaint MSSA Bactermia/status post myocutaneous and fasciocutaneous flap advancements for coverage of a chronic right hip wound/s/p I&D of right hip wound/s/p kyphoplasty     S:This 74 y.o. female admitted to [facility] on 10/21/2021 after suffering a fall off of her bed at home on 10/18/2021. She presented to the ED on the 10/21 with complaint of back pain. Imaging revealed a T12 compression. Initially plan was to treat patient conservatively with TLSO brace. Subsequent workup identified staph bacteremia, confirmed by 2 of 2 peripheral blood cultures, and a UTI with 4+ bacteria. Out of concern that the UTI might not represent the primary source of the bacteremia, additional workup was appropriately performed. CT imaging of the right hip subsequently identified a fluid collection and soft tissue induration, which triggered concern for an abscess. Mrs. Aziza Mayes is 5 weeks and 3 days status post myocutaneous and fasciocutaneous flap advancements for coverage of a chronic right hip wound. Incidentally, tissue cultures were sent at the time of her coverage procedure, and these resulted in no growth. Understanding the patient's prior surgical history, the findings on CT are not unexpected. In other words, a deep space fluid collection and tissue induration 5 weeks after the procedure described above is not at all surprising, and in isolation, would not be a cause for concern. Given the circumstances of bacteremia; however, ruling out abscess or infected seroma pocket is necessary. Even if the fluid collection does not represent the etiology of the bacteremia, there is concern that it could have potentially become seeded.    Examination of the right hip was reassuring. There was no cellulitis or warmth. The surgical incision was well healed. The patient reports that she has been ambulating without difficulty. She has no tenderness to palpation over the fluid collection. Although it is essential to rule out infection at this site, it is premature to take the patient to the OR and re-create a wound that she previously struggled to manage for 7 months. A very reasonable course of action would be to attempt to aspirate the fluid collection under fluoroscopic guidance. If the contents are frankly purulent or if the fluid subsequently resulted in growth on culture, then  perform an I&D. If the aspiration reveals seroma and no growth on culture, then there is an opportunity to spare  patient re-creation of a previously very difficult wound. On 10/24,  CT imaging indicated a small fluid collection at the right hip at the location of her previous wound. This was aspirated and Gram stain showed presence of gram-positive cocci. Decision was made to proceed with I&D. Patient tolerated procedure well. Infectious disease was consulted for antibiotic management. Regarding her T12 compression fx, she has been unresponsive to nonoperative treatment modalities including analgesics including opioids, lidocaine patch, nonsteroidal anti-inflammatories, and muscle relaxants as well as bracing with TLSO. These fractures were sustained with minimal trauma and she has low bone density consistent with osteoporosis. She has been unresponsive to nonoperative treatment modalities including analgesics including opioids, lidocaine patch, nonsteroidal anti-inflammatories, and muscle relaxants as well as bracing with TLSO. These fractures were sustained with minimal trauma and he has low bone density consistent with osteoporosis. Based on these findings, decision was made to perform [facility] Balloon Kyphoplasty at T12 on 10/26/2021 by Dr Genaro Rene.  She tolerated procedure well. She is now medically stable and is participating with therapy. She is ready to begin rehab with plan of returning home after rehab dc with support from her 24/7 caregiver and family. Chronic pain right hip. No new issues. \"I don't well\". Doesn't explain what she means by this. No new issues. REVIEW OF SYSTEMS:  Constitutional: No fevers No chills  Neck:No stiffness  Respiratory: No shortness of breath  Cardiovascular: No chest pain No palpitations  Gastrointestinal: No abdominal pain    Genitourinary: No Dysuria  Neurological: No headache, no confusion    Past Medical History:      Diagnosis Date    CAD (coronary artery disease)     S/p Stent to right coronary artery 9/2010. S/p myocardial infarction May 2013.  Cellulitis     LT LEG IN PAST    History of blood transfusion     History of DVT (deep vein thrombosis)     LT    History of neutropenia     History of pulmonary fibrosis     Hx of blood clots 1992    LT LEG    Hypercholesterolemia     Hypertension     Intrinsic asthma     Pacemaker 11/17/2016    Post op infection     \"right knee leaking after my replacement\"    Rheumatoid arteritis ([address]     Sinus node dysfunction ([address]     Staph aureus infection     Status post placement of implantable loop recorder 2/13/15, 3/30/15    2/19/15  removed, infection    Syncope 2/11/2015    Thyroid disease     HYPOTHYROIDISM       Past Surgical History:      Procedure Laterality Date    BACK SURGERY  01/01/2018    CARDIAC CATHETERIZATION  5/14/14  MDL    normal LV function. EF 60%    CARDIAC CATHETERIZATION  1/26/15  MDL    EF 60%, Left circ has long 80-90% in small 2mm vessel, diffuse disease in RCA    CORONARY ANGIOPLASTY WITH STENT PLACEMENT  2014    DIAGNOSTIC CARDIAC CATH LAB PROCEDURE      Right coronary artery stent.  9/2010    JOINT REPLACEMENT      LEEP      PACEMAKER INSERTION  11/17/2016    Dr Yakov Koch; [employer]    PACEMAKER PLACEMENT      medtronic    [place] INCIS/DRAIN THIGH/KNEE ABSCESS,DEEP Right 10/23/2018    KNEE INCISION AND DRAINAGE performed by Dean Atkinson MD at [facility] Right 9/11/2018    KNEE TOTAL ARTHROPLASTY performed by Dean Atkinson MD at [place] [address] Right 12/6/2018    KNEE EXPLANT AND PLACEMENT OF ANTIBIOTIC SPACER performed by Dean Atkinson MD at [facility] OR       Medications in Hospital:      Current Facility-Administered Medications:     polyethylene glycol (GLYCOLAX) packet 17 g, 17 g, Oral, Daily, Benitez Ortiz MD    oxyCODONE (ROXICODONE) immediate release tablet 10 mg, 10 mg, Oral, Q4H PRN, Benitez Ortiz MD, 10 mg at 11/01/21 2007    cloNIDine (CATAPRES) tablet 0.1 mg, 0.1 mg, Oral, Q4H PRN, Benitez Ortiz MD    lisinopril (PRINIVIL;ZESTRIL) tablet 10 mg, 10 mg, Oral, Daily, Benitez Ortiz MD, 10 mg at 11/01/21 0813    traMADol (ULTRAM) tablet 50 mg, 50 mg, Oral, TID, eBnitez Ortiz MD, 50 mg at 11/01/21 2007    acetaminophen (TYLENOL) tablet 650 mg, 650 mg, Oral, Q4H PRN, Benitez Ortiz MD    aspirin EC tablet 81 mg, 81 mg, Oral, Daily, Benitez Ortiz MD, 81 mg at 11/01/21 0813    carvedilol (COREG) tablet 25 mg, 25 mg, Oral, BID WC, Benitez Ortiz MD, 25 mg at 11/01/21 1659    docusate sodium (COLACE) capsule 100 mg, 100 mg, Oral, Daily, Benitez Ortiz MD, 100 mg at 11/01/21 0813    ferrous sulfate (IRON 325) tablet 325 mg, 325 mg, Oral, Daily with breakfast, Benitez Ortiz MD, 325 mg at 11/01/21 0813    fluticasone (FLONASE) 50 MCG/ACT nasal spray 1 spray, 1 spray, Each Nostril, Daily PRN, Benitez Ortiz MD    folic acid (FOLVITE) tablet 1 mg, 1 mg, Oral, Daily, Benitez Ortiz MD, 1 mg at 11/01/21 0812    furosemide (LASIX) tablet 40 mg, 40 mg, Oral, Daily, Benitez Ortiz MD, 40 mg at 11/01/21 6188    isosorbide mononitrate (IMDUR) extended release tablet 60 mg, 60 mg, Oral, BID, Benitez Ortiz MD, 60 mg at 11/01/21 2007    levothyroxine (SYNTHROID) tablet 75 mcg, 75 mcg, Oral, Daily, Norma Hunter MD, 75 mcg at 11/02/21 0518    lidocaine 4 % external patch 1 patch, 1 patch, TransDERmal, Daily, Norma Hunter MD, 1 patch at 10/31/21 0748    magnesium oxide (MAG-OX) tablet 400 mg, 400 mg, Oral, Daily, Norma Hunter MD, 400 mg at 11/01/21 7505    therapeutic multivitamin-minerals 1 tablet, 1 tablet, Oral, Daily, Norma Hunter MD, 1 tablet at 11/01/21 0813    naloxegol (MOVANTIK) tablet 25 mg, 25 mg, Oral, QAM, Norma Hunter MD, 25 mg at 11/01/21 0812    nitroGLYCERIN (NITROSTAT) SL tablet 0.4 mg, 0.4 mg, SubLINGual, Q5 Min PRN, Norma Hunter MD    pantoprazole (PROTONIX) tablet 40 mg, 40 mg, Oral, Daily, Norma Hunter MD, 40 mg at 11/01/21 3707    potassium chloride (KLOR-CON M) extended release tablet 20 mEq, 20 mEq, Oral, BID, Norma Hunter MD, 20 mEq at 11/01/21 2007    rivaroxaban (XARELTO) tablet 20 mg, 20 mg, Oral, Daily, Norma Hunter MD, 20 mg at 11/01/21 0813    salsalate (DISALCID) tablet 500 mg, 500 mg, Oral, Daily, Norma Hunter MD    sucralfate (CARAFATE) tablet 1 g, 1 g, Oral, 4x Daily PC & HS, Norma Hunter MD, 1 g at 11/01/21 2007    valACYclovir (VALTREX) tablet 500 mg, 500 mg, Oral, Daily, Norma Hunter MD, 500 mg at 11/01/21 6930    thiamine mononitrate tablet 250 mg, 250 mg, Oral, Daily, Norma Hunter MD, 250 mg at 11/01/21 6633    acetaminophen (TYLENOL) tablet 650 mg, 650 mg, Oral, Q4H PRN, Norma Hunter MD, 650 mg at 10/31/21 1145    polyethylene glycol (GLYCOLAX) packet 17 g, 17 g, Oral, Daily PRN, Norma Hunter MD    ceFAZolin (ANCEF) 2000 mg in dextrose 4 % 100 mL IVPB (premix), 2,000 mg, IntraVENous, Q8H, Norma Hunter MD, Stopped at 11/02/21 0624    lactobacillus (CULTURELLE) capsule 1 capsule, 1 capsule, Oral, Daily, Norma Hunter MD, 1 capsule at 11/01/21 0812    sodium chloride flush 0.9 % injection 5-40 mL, 5-40 mL, IntraVENous, BID, Norma Hunter MD, 10 mL at 11/01/21 2007    sodium 9.5 (L) 11/01/2021    HCT 30.0 (L) 11/01/2021    MCV 86.5 11/01/2021     11/01/2021     Lab Results   Component Value Date     (L) 11/01/2021    K 5.0 11/01/2021    CL 95 (L) 11/01/2021    CO2 29 11/01/2021    BUN 29 (H) 11/01/2021    CREATININE 0.7 11/01/2021    GLUCOSE 112 (H) 11/01/2021    CALCIUM 8.8 11/01/2021    PROT 7.4 11/01/2021    LABALBU 2.7 (L) 11/01/2021    BILITOT 0.7 11/01/2021    ALKPHOS 140 (H) 11/01/2021    AST 18 11/01/2021    ALT <5 (A) 11/01/2021    LABGLOM >60 11/01/2021    GFRAA >59 11/01/2021     Lab Results   Component Value Date    INR 1.50 (H) 10/28/2021    INR 1.20 (H) 12/05/2018    INR 1.14 09/04/2018    PROTIME 18.2 (H) 10/28/2021    PROTIME 15.2 (H) 12/05/2018    PROTIME 14.5 09/04/2018         Physical Therapist   Physical Therapy   Progress Notes      Signed   Date of Service:  10/30/2021  2:22 PM               Signed             Show:Clear all  []Manual[x]Template[]Copied    Added by:  [x]Du Lam PT    []Shiela for details       10/30/21 9355   Restrictions/Precautions   Restrictions/Precautions Weight Bearing; Fall Risk   Required Braces or Orthoses? Yes   Implants present? Pacemaker   Lower Extremity Weight Bearing Restrictions   Right Lower Extremity Weight Bearing Weight Bearing As Tolerated   Left Lower Extremity Weight Bearing Weight Bearing As Tolerated   Required Braces or Orthoses   Spinal Thoracic Lumbar Sacral Orthotics   Position Activity Restriction   Spinal Precautions No Bending; No Lifting; No Twisting   Pain Screening   Patient Currently in Pain Yes  (nsg came into room during tx to check on meds)   Pain Assessment   Pain Assessment 0-10   Pain Level 8   Pain Type Acute pain;Surgical pain   Pain Location Hip;Back;Groin   Pain Orientation Right   Pain Descriptors Discomfort   Pain Frequency Continuous  (worsened by movement)   Functional Pain Assessment Prevents or interferes with all active and some passive activities   Non-Pharmaceutical Pain Intervention(s) Repositioned; Ambulation/Increased Activity; Distraction   Response to Pain Intervention None   Bed Mobility   Sit to Supine Maximal assistance  (x2; ONE THERAPIST ON TRUNK, ONE ON LES. FROM R)   Transfers   Bed to Chair Maximum assistance  (X2 WITH SLIDING BOARD TO RIGHT)   Comment LACK OF ANTERIOR LEAN, LACK OF FORCE TRHOUGH LES, FORCE THROUGH UES   Conditions Requiring Skilled Therapeutic Intervention   Body structures, Functions, Activity limitations Decreased functional mobility ; Decreased ROM; Decreased strength;Decreased endurance;Decreased balance;Decreased coordination; Increased pain;Decreased posture   Assessment TRANSFERED FROM DROP ARM RECLINER TO WHEELCHAIR, WHEELCIAR TO BED. MAX X2 FOR POSITIONING IN WHEELCHAIR, PUSHING BUTTOCKS BACK ONTO BED.                Impression   1. Possible air in the soft tissues lateral to the right hip and   pelvis. Soft tissue infection cannot be ruled out. 2. The hip joint spaces are symmetric and well maintained bilaterally. No evidence of any acute bony abnormality. There is mild enthesopathy   of the pelvis. 3. Soft tissue ossifications bilaterally likely related to prior   trauma. Correlate clinically.       Signed by Dr Leonor Milton REVIEW: Previous medical records, medications were reviewed at today's visit    IMPRESSION:   1. MSSA bacteremia/wound infection-Abx as per ID  2. CAD-ASA/Indur  3. HTN-on meds  4. GI-bowel regimen/PPI/Carafate/Lactobacillus   5. Anemia-iron  6. Hypothyroidism-on Synthroid  7. Back pain-Lidoderm patch/Tramadol/Noroc PRN  8. History of DVT-on Xarelto  9. History of Sjogren's  10. Arthritis-Salsalate   11. History of herpes simplex-on Valtrex   12.  PT/OT    Xray hip-possible air in soft tissue lateral to right hip-no fracture  Afebrile/normal WBC on abx  If worsening symptoms plan MRI right hip with and without contrast    Team conference with PT/OT/speech/nursing/Care coordinator to review in depth patients care and discharge planning    Max assist bed mobility  0 WC   0 ambulation 0 steps    Continue current  care    ELOS November 12     Expected duration and frequency therapy: 180 minutes per day, 5 days per week    Mike [facility]  [phone] CELL  Dr Gretchen Dunne

## 2021-11-03 ENCOUNTER — APPOINTMENT (OUTPATIENT)
Dept: MRI IMAGING | Age: 66
DRG: 871 | End: 2021-11-03
Attending: PSYCHIATRY & NEUROLOGY
Payer: MEDICARE

## 2021-11-03 VITALS
HEIGHT: 66 IN | BODY MASS INDEX: 26.36 KG/M2 | HEART RATE: 76 BPM | SYSTOLIC BLOOD PRESSURE: 126 MMHG | DIASTOLIC BLOOD PRESSURE: 55 MMHG | WEIGHT: 164 LBS | OXYGEN SATURATION: 97 % | RESPIRATION RATE: 18 BRPM | TEMPERATURE: 96.7 F

## 2021-11-03 LAB — SARS-COV-2, NAAT: NOT DETECTED

## 2021-11-03 PROCEDURE — 73723 MRI JOINT LWR EXTR W/O&W/DYE: CPT

## 2021-11-03 PROCEDURE — 6360000002 HC RX W HCPCS: Performed by: PSYCHIATRY & NEUROLOGY

## 2021-11-03 PROCEDURE — 99239 HOSP IP/OBS DSCHRG MGMT >30: CPT | Performed by: PSYCHIATRY & NEUROLOGY

## 2021-11-03 PROCEDURE — A9577 INJ MULTIHANCE: HCPCS | Performed by: PSYCHIATRY & NEUROLOGY

## 2021-11-03 PROCEDURE — 6370000000 HC RX 637 (ALT 250 FOR IP): Performed by: PSYCHIATRY & NEUROLOGY

## 2021-11-03 PROCEDURE — 2580000003 HC RX 258: Performed by: PSYCHIATRY & NEUROLOGY

## 2021-11-03 PROCEDURE — 51798 US URINE CAPACITY MEASURE: CPT

## 2021-11-03 PROCEDURE — 6360000004 HC RX CONTRAST MEDICATION: Performed by: PSYCHIATRY & NEUROLOGY

## 2021-11-03 PROCEDURE — 97130 THER IVNTJ EA ADDL 15 MIN: CPT

## 2021-11-03 PROCEDURE — 97116 GAIT TRAINING THERAPY: CPT

## 2021-11-03 PROCEDURE — 97535 SELF CARE MNGMENT TRAINING: CPT

## 2021-11-03 PROCEDURE — 1180000000 HC REHAB R&B

## 2021-11-03 PROCEDURE — 97129 THER IVNTJ 1ST 15 MIN: CPT

## 2021-11-03 PROCEDURE — 72148 MRI LUMBAR SPINE W/O DYE: CPT

## 2021-11-03 PROCEDURE — 87635 SARS-COV-2 COVID-19 AMP PRB: CPT

## 2021-11-03 PROCEDURE — 97530 THERAPEUTIC ACTIVITIES: CPT

## 2021-11-03 PROCEDURE — 97110 THERAPEUTIC EXERCISES: CPT

## 2021-11-03 RX ORDER — CEFAZOLIN SODIUM 2 G/100ML
2000 INJECTION, SOLUTION INTRAVENOUS EVERY 8 HOURS
Qty: 3000 ML | Refills: 0
Start: 2021-11-03 | End: 2021-11-11

## 2021-11-03 RX ORDER — POLYETHYLENE GLYCOL 3350 17 G/17G
17 POWDER, FOR SOLUTION ORAL 2 TIMES DAILY
Status: DISCONTINUED | OUTPATIENT
Start: 2021-11-03 | End: 2021-11-04 | Stop reason: HOSPADM

## 2021-11-03 RX ORDER — OXYCODONE HYDROCHLORIDE AND ACETAMINOPHEN 5; 325 MG/1; MG/1
1 TABLET ORAL EVERY 4 HOURS PRN
Status: DISCONTINUED | OUTPATIENT
Start: 2021-11-03 | End: 2021-11-04 | Stop reason: HOSPADM

## 2021-11-03 RX ORDER — LISINOPRIL 10 MG/1
10 TABLET ORAL DAILY
Qty: 30 TABLET | Refills: 0
Start: 2021-11-04

## 2021-11-03 RX ADMIN — ASPIRIN 81 MG: 81 TABLET, COATED ORAL at 09:18

## 2021-11-03 RX ADMIN — ACETAMINOPHEN 650 MG: 325 TABLET ORAL at 22:32

## 2021-11-03 RX ADMIN — NALOXEGOL OXALATE 25 MG: 12.5 TABLET, FILM COATED ORAL at 09:17

## 2021-11-03 RX ADMIN — CEFAZOLIN SODIUM 2000 MG: 2 INJECTION, SOLUTION INTRAVENOUS at 21:11

## 2021-11-03 RX ADMIN — POTASSIUM CHLORIDE 20 MEQ: 1500 TABLET, EXTENDED RELEASE ORAL at 09:18

## 2021-11-03 RX ADMIN — POTASSIUM CHLORIDE 20 MEQ: 1500 TABLET, EXTENDED RELEASE ORAL at 21:12

## 2021-11-03 RX ADMIN — GADOBENATE DIMEGLUMINE 14 ML: 529 INJECTION, SOLUTION INTRAVENOUS at 19:00

## 2021-11-03 RX ADMIN — MAGNESIUM OXIDE TAB 400 MG (240 MG ELEMENTAL MG) 400 MG: 400 (240 MG) TAB at 09:16

## 2021-11-03 RX ADMIN — PANTOPRAZOLE SODIUM 40 MG: 40 TABLET, DELAYED RELEASE ORAL at 09:18

## 2021-11-03 RX ADMIN — VALACYCLOVIR HYDROCHLORIDE 500 MG: 500 TABLET, FILM COATED ORAL at 09:17

## 2021-11-03 RX ADMIN — FUROSEMIDE 40 MG: 40 TABLET ORAL at 09:18

## 2021-11-03 RX ADMIN — THIAMINE HCL TAB 100 MG 250 MG: 100 TAB at 09:18

## 2021-11-03 RX ADMIN — POLYETHYLENE GLYCOL 3350 17 G: 17 POWDER, FOR SOLUTION ORAL at 09:16

## 2021-11-03 RX ADMIN — Medication 1 TABLET: at 09:16

## 2021-11-03 RX ADMIN — FERROUS SULFATE TAB 325 MG (65 MG ELEMENTAL FE) 325 MG: 325 (65 FE) TAB at 09:17

## 2021-11-03 RX ADMIN — OXYCODONE HYDROCHLORIDE AND ACETAMINOPHEN 1 TABLET: 7.5; 325 TABLET ORAL at 09:17

## 2021-11-03 RX ADMIN — OXYCODONE HYDROCHLORIDE AND ACETAMINOPHEN 1 TABLET: 5; 325 TABLET ORAL at 19:48

## 2021-11-03 RX ADMIN — SODIUM CHLORIDE, PRESERVATIVE FREE 10 ML: 5 INJECTION INTRAVENOUS at 21:12

## 2021-11-03 RX ADMIN — LEVOTHYROXINE SODIUM 75 MCG: 75 TABLET ORAL at 05:21

## 2021-11-03 RX ADMIN — ISOSORBIDE MONONITRATE 60 MG: 60 TABLET, EXTENDED RELEASE ORAL at 09:18

## 2021-11-03 RX ADMIN — SUCRALFATE 1 G: 1 TABLET ORAL at 09:17

## 2021-11-03 RX ADMIN — LISINOPRIL 10 MG: 10 TABLET ORAL at 09:18

## 2021-11-03 RX ADMIN — SODIUM CHLORIDE, PRESERVATIVE FREE 10 ML: 5 INJECTION INTRAVENOUS at 09:21

## 2021-11-03 RX ADMIN — CARVEDILOL 25 MG: 25 TABLET, FILM COATED ORAL at 09:18

## 2021-11-03 RX ADMIN — CEFAZOLIN SODIUM 2000 MG: 2 INJECTION, SOLUTION INTRAVENOUS at 05:21

## 2021-11-03 RX ADMIN — OXYCODONE HYDROCHLORIDE AND ACETAMINOPHEN 1 TABLET: 7.5; 325 TABLET ORAL at 03:13

## 2021-11-03 RX ADMIN — ISOSORBIDE MONONITRATE 60 MG: 60 TABLET, EXTENDED RELEASE ORAL at 21:11

## 2021-11-03 RX ADMIN — DOCUSATE SODIUM 100 MG: 100 CAPSULE ORAL at 09:16

## 2021-11-03 RX ADMIN — SUCRALFATE 1 G: 1 TABLET ORAL at 13:07

## 2021-11-03 RX ADMIN — RIVAROXABAN 20 MG: 20 TABLET, FILM COATED ORAL at 09:18

## 2021-11-03 RX ADMIN — FOLIC ACID 1 MG: 1 TABLET ORAL at 09:18

## 2021-11-03 RX ADMIN — CEFAZOLIN SODIUM 2000 MG: 2 INJECTION, SOLUTION INTRAVENOUS at 13:40

## 2021-11-03 RX ADMIN — SUCRALFATE 1 G: 1 TABLET ORAL at 21:12

## 2021-11-03 RX ADMIN — Medication 1 CAPSULE: at 09:17

## 2021-11-03 ASSESSMENT — PAIN DESCRIPTION - ONSET
ONSET: ON-GOING

## 2021-11-03 ASSESSMENT — PAIN DESCRIPTION - LOCATION
LOCATION: BACK;LEG
LOCATION: LEG
LOCATION: HIP
LOCATION: HIP
LOCATION: BACK;LEG
LOCATION: BACK

## 2021-11-03 ASSESSMENT — PAIN DESCRIPTION - PAIN TYPE
TYPE: SURGICAL PAIN
TYPE: ACUTE PAIN;SURGICAL PAIN

## 2021-11-03 ASSESSMENT — PAIN DESCRIPTION - FREQUENCY
FREQUENCY: CONTINUOUS

## 2021-11-03 ASSESSMENT — PAIN SCALES - GENERAL
PAINLEVEL_OUTOF10: 8
PAINLEVEL_OUTOF10: 10
PAINLEVEL_OUTOF10: 0
PAINLEVEL_OUTOF10: 7
PAINLEVEL_OUTOF10: 6
PAINLEVEL_OUTOF10: 8
PAINLEVEL_OUTOF10: 6

## 2021-11-03 ASSESSMENT — PAIN DESCRIPTION - PROGRESSION
CLINICAL_PROGRESSION: NOT CHANGED

## 2021-11-03 ASSESSMENT — PAIN DESCRIPTION - ORIENTATION
ORIENTATION: RIGHT;LEFT
ORIENTATION: RIGHT;LEFT
ORIENTATION: RIGHT;LEFT;POSTERIOR
ORIENTATION: RIGHT;LEFT;POSTERIOR

## 2021-11-03 ASSESSMENT — PAIN DESCRIPTION - DESCRIPTORS
DESCRIPTORS: DISCOMFORT
DESCRIPTORS: ACHING;DISCOMFORT
DESCRIPTORS: ACHING;SORE;DISCOMFORT
DESCRIPTORS: ACHING;SORE;DISCOMFORT

## 2021-11-03 ASSESSMENT — PAIN - FUNCTIONAL ASSESSMENT
PAIN_FUNCTIONAL_ASSESSMENT: PREVENTS OR INTERFERES SOME ACTIVE ACTIVITIES AND ADLS

## 2021-11-03 NOTE — PROGRESS NOTES
[facility]  INPATIENT SPEECH THERAPY  [facility] 8 REHAB UNIT      TIME   Time In: 1300  Time Out: 1400  Minutes: 60       [x]Daily Note  []Progress Note    Date: 11/3/2021  Patient Name: Armen Oates        MRN: [id number]    Account #: [de-identified]  : 1955  (75 y.o.)  Gender: female   Primary Provider: Norma Hunter MD  Swallowing Status/Diet: Regular diet, thin liquids    PATIENT DIAGNOSIS(ES):    Diagnosis: T12 compression fx s/p kyphoplasty, I&D right hip      Additional Pertinent Hx: lumbar decompression/fusion, C6 corpectomy, L1-2 disectomy, I&D psoas abscess, L charcot ankle/foot, HTN, RA, spinal stenosis, cervical disc myelopathy, chronic emboism and thrombosis DVTs, osteoporosis  RESTRICTIONS/PRECAUTIONS:    Restrictions/Precautions  Restrictions/Precautions: Weight Bearing, Fall Risk  Required Braces or Orthoses?: Yes  Implants present? : Pacemaker  Position Activity Restriction  Spinal Precautions: No Bending, No Lifting, No Twisting         Subjective: The patient reported 6 out of 10 pain this afternoon. She was unable to recall when she last received her pain medication. She was very drowsy this afternoon and fell asleep often. Objective: Today portions of the CLQT were completed. She was unable to complete the full assessment due to drowsiness. Today on the CLQT, she was able to answer questions about personal facts. She also completed a picture naming task without cueing. She did attempt a task where she was asked to recall a story but this proved very challenging. Will complete the remaining sub tests of the CLQT at a later date. During her last admission on rehab on 21 she completed the CLQT and her overall score was mild. She does have a history of esophageal dysphagia but denies any difficulty with reflux or swallowing this admission. Today, she was unable to state the year. She was able to state the month and her location with cues.   She was able to correctly state her age and birth date without cues. She was able to recall her address without cueing. She was unable to state her phone number even with cueing. She was able to recall her email address today. Her discharge date is set for November 12th. SHORT TERM GOAL #1:  Goal 1: The patient will follow 2 step commands related to functional living environment with 100% accuracyand (min) cues in order to increase functional integration into environment. SHORT TERM GOAL #2:  Goal 2: The patient will recite automatic speech tasks given (min) cues. SHORT TERM GOAL #3:  Goal 3: The patient will provide 15 members in a given category with 100% accuracy and (min) cues in the form of a visual aid, semantic/phonemic cueing, etc.    SHORT TERM GOAL #4:  Goal 4: The patient will demonstrate functional problem solving and safety awareness with 100% verbal,tactile and visual cues for daily living tasks in order to increase safe interaction with environment and decreased assistance from caregivers. SHORT TERM GOAL #5:  Goal 5: The patient will demonstrate recall of functional information following a/an (immediate, short term,long-term) delay with min cues in order to increase functional integration into environment.       ASSESSMENT:  Assessment: [x]Progressing towards goals          []Not Progressing towards goals    Patient Tolerance of Treatment:   [x]Tolerated well []Tolerated fair []Required rest breaks []Fatigued    Education:  Learner:  [x]Patient          []Significant Other          []Other  Education provided regarding:  [x]Goals and POC   []Diet and swallowing precautions    []Home Exercise Program  []Progress and/or discharge information  Method of Education:  [x]Discussion          []Demonstration          []Handout          []Other  Evaluation of Education:   [x]Verbalized understanding   []Demonstrates without assistance  []Demonstrates with assistance  []Needs further instruction     []No evidence of learning []No family present      Plan: [x]Continue with current plan of care    []Modify current plan of care as follows:    []Discharge patient    Discharge Location:    Services/Supervision Recommended:      [x]Patient continues to require treatment by a licensed therapist to address functional deficits as outlined in the established plan of care.       Electronically Signed By:  Isabela Castro  11/3/2021,1:17 PM.

## 2021-11-03 NOTE — PROGRESS NOTES
Patient:   Shadia Modi  MR#:    [id number]   Room:    20/820-02   YOB: 1955  Date of Progress Note: 11/3/2021  Time of Note                           12:44 PM  Consulting Physician:   Tasha Yanez M.D. Attending Physician:  Tasha Yanez MD     Chief complaint MSSA Bactermia/status post myocutaneous and fasciocutaneous flap advancements for coverage of a chronic right hip wound/s/p I&D of right hip wound/s/p kyphoplasty     S:This 74 y.o. female admitted to [facility] on 10/21/2021 after suffering a fall off of her bed at home on 10/18/2021. She presented to the ED on the 10/21 with complaint of back pain. Imaging revealed a T12 compression. Initially plan was to treat patient conservatively with TLSO brace. Subsequent workup identified staph bacteremia, confirmed by 2 of 2 peripheral blood cultures, and a UTI with 4+ bacteria. Out of concern that the UTI might not represent the primary source of the bacteremia, additional workup was appropriately performed. CT imaging of the right hip subsequently identified a fluid collection and soft tissue induration, which triggered concern for an abscess. Mrs. Reynaldo Márquez is 5 weeks and 3 days status post myocutaneous and fasciocutaneous flap advancements for coverage of a chronic right hip wound. Incidentally, tissue cultures were sent at the time of her coverage procedure, and these resulted in no growth. Understanding the patient's prior surgical history, the findings on CT are not unexpected. In other words, a deep space fluid collection and tissue induration 5 weeks after the procedure described above is not at all surprising, and in isolation, would not be a cause for concern. Given the circumstances of bacteremia; however, ruling out abscess or infected seroma pocket is necessary. Even if the fluid collection does not represent the etiology of the bacteremia, there is concern that it could have potentially become seeded.    Examination of the right hip was reassuring. There was no cellulitis or warmth. The surgical incision was well healed. The patient reports that she has been ambulating without difficulty. She has no tenderness to palpation over the fluid collection. Although it is essential to rule out infection at this site, it is premature to take the patient to the OR and re-create a wound that she previously struggled to manage for 7 months. A very reasonable course of action would be to attempt to aspirate the fluid collection under fluoroscopic guidance. If the contents are frankly purulent or if the fluid subsequently resulted in growth on culture, then  perform an I&D. If the aspiration reveals seroma and no growth on culture, then there is an opportunity to spare  patient re-creation of a previously very difficult wound. On 10/24,  CT imaging indicated a small fluid collection at the right hip at the location of her previous wound. This was aspirated and Gram stain showed presence of gram-positive cocci. Decision was made to proceed with I&D. Patient tolerated procedure well. Infectious disease was consulted for antibiotic management. Regarding her T12 compression fx, she has been unresponsive to nonoperative treatment modalities including analgesics including opioids, lidocaine patch, nonsteroidal anti-inflammatories, and muscle relaxants as well as bracing with TLSO. These fractures were sustained with minimal trauma and she has low bone density consistent with osteoporosis. She has been unresponsive to nonoperative treatment modalities including analgesics including opioids, lidocaine patch, nonsteroidal anti-inflammatories, and muscle relaxants as well as bracing with TLSO. These fractures were sustained with minimal trauma and he has low bone density consistent with osteoporosis. Based on these findings, decision was made to perform [facility] Balloon Kyphoplasty at T12 on 10/26/2021 by Dr Delonte Mckeon.  She tolerated [facility] [address] [facility] Right 9/11/2018    KNEE TOTAL ARTHROPLASTY performed by Luo Murray MD at [place] [address] Right 12/6/2018    KNEE EXPLANT AND PLACEMENT OF ANTIBIOTIC SPACER performed by Lou Murray MD at [address] [place] [address]       Medications in Hospital:      Current Facility-Administered Medications:     magnesium hydroxide (MILK OF MAGNESIA) 400 MG/5ML suspension 30 mL, 30 mL, Oral, Daily PRN, Susan Bain MD    polyethylene glycol (GLYCOLAX) packet 17 g, 17 g, Oral, BID, Susan Bain MD, 17 g at 11/03/21 0916    oxyCODONE-acetaminophen (PERCOCET) 7.5-325 MG per tablet 1 tablet, 1 tablet, Oral, Q4H PRN, Susan Bain MD, 1 tablet at 11/03/21 0917    cloNIDine (CATAPRES) tablet 0.1 mg, 0.1 mg, Oral, Q4H PRN, Susan Bain MD    lisinopril (PRINIVIL;ZESTRIL) tablet 10 mg, 10 mg, Oral, Daily, Susan Bain MD, 10 mg at 11/03/21 0918    acetaminophen (TYLENOL) tablet 650 mg, 650 mg, Oral, Q4H PRN, Susan Bain MD    aspirin EC tablet 81 mg, 81 mg, Oral, Daily, Susan Bain MD, 81 mg at 11/03/21 0918    carvedilol (COREG) tablet 25 mg, 25 mg, Oral, BID WC, Susan Bain MD, 25 mg at 11/03/21 0918    docusate sodium (COLACE) capsule 100 mg, 100 mg, Oral, Daily, Susan Bain MD, 100 mg at 11/03/21 0916    ferrous sulfate (IRON 325) tablet 325 mg, 325 mg, Oral, Daily with breakfast, Susan Bain MD, 325 mg at 11/03/21 0917    fluticasone (FLONASE) 50 MCG/ACT nasal spray 1 spray, 1 spray, Each Nostril, Daily PRN, Susan Bain MD    folic acid (FOLVITE) tablet 1 mg, 1 mg, Oral, Daily, Susan Bain MD, 1 mg at 11/03/21 0918    furosemide (LASIX) tablet 40 mg, 40 mg, Oral, Daily, Susan Bain MD, 40 mg at 11/03/21 0918    isosorbide mononitrate (IMDUR) extended release tablet 60 mg, 60 mg, Oral, BID, Susan Bain MD, 60 mg at 11/03/21 0918    levothyroxine (SYNTHROID) tablet 75 mcg, 75 mcg, Oral, Daily, Katharina Black MD, 75 mcg at 11/03/21 0521    lidocaine 4 % external patch 1 patch, 1 patch, TransDERmal, Daily, Katharina Black MD, 1 patch at 11/03/21 0919    magnesium oxide (MAG-OX) tablet 400 mg, 400 mg, Oral, Daily, Katharina Black MD, 400 mg at 11/03/21 0916    therapeutic multivitamin-minerals 1 tablet, 1 tablet, Oral, Daily, Katharina Black MD, 1 tablet at 11/03/21 0916    naloxegol (MOVANTIK) tablet 25 mg, 25 mg, Oral, QAM, Katharina Black MD, 25 mg at 11/03/21 0917    nitroGLYCERIN (NITROSTAT) SL tablet 0.4 mg, 0.4 mg, SubLINGual, Q5 Min PRN, Katharina Black MD    pantoprazole (PROTONIX) tablet 40 mg, 40 mg, Oral, Daily, Katharina Black MD, 40 mg at 11/03/21 0918    potassium chloride (KLOR-CON M) extended release tablet 20 mEq, 20 mEq, Oral, BID, Katharina Black MD, 20 mEq at 11/03/21 0918    rivaroxaban (XARELTO) tablet 20 mg, 20 mg, Oral, Daily, Katharina Black MD, 20 mg at 11/03/21 0918    salsalate (DISALCID) tablet 500 mg, 500 mg, Oral, Daily, Katharina Black MD    sucralfate (CARAFATE) tablet 1 g, 1 g, Oral, 4x Daily PC & HS, Katharina Black MD, 1 g at 11/03/21 0917    valACYclovir (VALTREX) tablet 500 mg, 500 mg, Oral, Daily, Katharina Black MD, 500 mg at 11/03/21 0917    thiamine mononitrate tablet 250 mg, 250 mg, Oral, Daily, Katharina Black MD, 250 mg at 11/03/21 0918    acetaminophen (TYLENOL) tablet 650 mg, 650 mg, Oral, Q4H PRN, Katharina Black MD, 650 mg at 10/31/21 1145    polyethylene glycol (GLYCOLAX) packet 17 g, 17 g, Oral, Daily PRN, Katharina Black MD    ceFAZolin (ANCEF) 2000 mg in dextrose 4 % 100 mL IVPB (premix), 2,000 mg, IntraVENous, Q8H, Katharina Black MD, Stopped at 11/03/21 0633    lactobacillus (CULTURELLE) capsule 1 capsule, 1 capsule, Oral, Daily, Katharina Black MD, 1 capsule at 11/03/21 0917    sodium chloride flush 0.9 % injection 5-40 mL, 5-40 mL, IntraVENous, BID, Katharina Black MD, 10 mL at 11/03/21 0921    sodium chloride flush 0.9 % injection 5-40 mL, 5-40 mL, IntraVENous, PRN, Susan Bain MD, 10 mL at 11/02/21 1413    Allergies:  Amoxicillin, Lipitor, Niaspan [niacin er], Penicillins, Relafen [nabumetone], and Zocor [simvastatin]    Social History:   TOBACCO:   reports that she has never smoked. She has never used smokeless tobacco.  ETOH:   reports no history of alcohol use. Family History:       Problem Relation Age of Onset    Cancer Mother         LUNG CA    Heart Attack Father 39        FATAL MI         PHYSICAL EXAM:  BP (!) 188/60   Pulse 71   Temp 95.9 °F (35.5 °C)   Resp 16   Ht 5' 6\" (1.676 m)   Wt 164 lb (74.4 kg)   SpO2 97%   BMI 26.47 kg/m²       Constitutional - well developed, well nourished. Eyes - conjunctiva normal.   Ear, nose, throat - No scars, masses, or lesions over external nose or ears, no atrophy of tongue  Neck-symmetric, no masses noted, no jugular vein distension  Respiration- chest wall appears symmetric, good expansion,   normal effort without use of accessory muscles  Musculoskeletal - no significant wasting of muscles noted, no bony deformities  Extremities-no clubbing, cyanosis or edema  Skin - warm, dry, and intact. No rash, erythema, or pallor. Psychiatric - mood, affect, and behavior appear normal.      Neurological exam  Awake, alert, fluent oriented appropriate affect  Attention and concentration appear appropriate  Recent and remote memory appears unremarkable  Speech normal without dysarthria  No clear issues with language of fund of knowledge     Cranial Nerve Exam     CN III, IV,VI-EOMI, No nystagmus, conjugate eye movements, no ptosis    CN VII-no facial assymetry       Motor Exam  antigravity throughout upper extremities bilaterally      Tremors- no tremors in hands or head noted     Gait  Not tested      Nursing/pcp notes, imaging,labs and vitals reviewed.      PT,OT and/or speech notes reviewed    Lab Results   Component Value Date    WBC 6.5 11/01/2021    HGB 9.5 (L) 11/01/2021    HCT 30.0 (L) 11/01/2021    MCV 86.5 11/01/2021     11/01/2021     Lab Results   Component Value Date     (L) 11/01/2021    K 5.0 11/01/2021    CL 95 (L) 11/01/2021    CO2 29 11/01/2021    BUN 29 (H) 11/01/2021    CREATININE 0.7 11/01/2021    GLUCOSE 112 (H) 11/01/2021    CALCIUM 8.8 11/01/2021    PROT 7.4 11/01/2021    LABALBU 2.7 (L) 11/01/2021    BILITOT 0.7 11/01/2021    ALKPHOS 140 (H) 11/01/2021    AST 18 11/01/2021    ALT <5 (A) 11/01/2021    LABGLOM >60 11/01/2021    GFRAA >59 11/01/2021     Lab Results   Component Value Date    INR 1.50 (H) 10/28/2021    INR 1.20 (H) 12/05/2018    INR 1.14 09/04/2018    PROTIME 18.2 (H) 10/28/2021    PROTIME 15.2 (H) 12/05/2018    PROTIME 14.5 09/04/2018     Lynda Clancy   Student Physical Therapist   Physical Therapy   Progress Notes       Cosign Needed   Date of Service:  11/3/2021 12:44 PM               Cosign Needed             Show:Clear all  []Manual[x]Template[]Copied    Added by:  Ana Paula Maravilla    []Shiela for details       11/03/21 1005   Restrictions/Precautions   Restrictions/Precautions Weight Bearing; Fall Risk   Required Braces or Orthoses? Yes   Implants present?   Pacemaker   Lower Extremity Weight Bearing Restrictions   Right Lower Extremity Weight Bearing Weight Bearing As Tolerated   Left Lower Extremity Weight Bearing Weight Bearing As Tolerated   Pain Screening   Patient Currently in Pain Yes   Pain Assessment   Pain Assessment 0-10   Pain Level 8   Patient's Stated Pain Goal No pain   Pain Type Acute pain;Surgical pain   Pain Location Back;Leg   Pain Orientation Right;Left;Posterior   Pain Descriptors Aching;Sore;Discomfort   Pain Frequency Continuous   Pain Onset On-going   Clinical Progression Not changed   Functional Pain Assessment Prevents or interferes some active activities and ADLs   Non-Pharmaceutical Pain Intervention(s) Rest;Repositioned   Response to Pain Intervention Patient Satisfied   Bed Mobility   Rolling Moderate assistance;Minimal assistance   Supine to Sit Maximal assistance   Scooting Maximal assistance   Comment pt requires bending of legs to asssit with roll, pt able to initate roll by reaching for railing and pulling self    Transfers   Bed to Chair Maximum assistance;2 Person Assistance   Comment sliding board to chair. pt demosntrates decreased BLe involvement difficulty to assist with tf    Ambulation   Ambulation? No   Exercises   Hip Flexion x10 AAROM BLE   Hip Extension/Leg Presses x10 AAROM BLE   Ankle Pumps x15 BLE    Conditions Requiring Skilled Therapeutic Intervention   Assessment pt demonstrates improved motivation and participation this am, pt able to understand cues for exercises and able to participate with ROM activities with assistance. pt continues to have difficulty bearing weight on BLE in order to partcipate in sliding baord tf to chair but demonstrates improved static sitting balance SBA/CGA while donning TLSO EOB. will progress BLE exercises as tolerated and improving sittingtolerance in recliner as pt toelrates.                       RECORD REVIEW: Previous medical records, medications were reviewed at today's visit    IMPRESSION:   1. MSSA bacteremia/wound infection-Abx as per ID  2. CAD-ASA/Indur  3. HTN-on meds  4. GI-bowel regimen/PPI/Carafate/Lactobacillus   5. Anemia-iron  6. Hypothyroidism-on Synthroid  7. Back pain-Lidoderm patch/Tramadol/Noroc PRN  8. History of DVT-on Xarelto  9. History of Sjogren's  10. Arthritis-Salsalate   11. History of herpes simplex-on Valtrex   12.  PT/OT    Xray hip-possible air in soft tissue lateral to right hip-no fracture  Afebrile/normal WBC on abx      Roxicodone/Tramadol and Roxicodone DCd previously  Changed to lower dose Percocet PRN  Will lower Percocet event further to 5 mg q 4 prn    Will check MRI right hip with and without contast    Continue current  care    ELOS November 12     Expected duration and frequency therapy: 180 minutes per

## 2021-11-03 NOTE — PROGRESS NOTES
11/03/21 1430   Restrictions/Precautions   Restrictions/Precautions Weight Bearing; Fall Risk   Required Braces or Orthoses? Yes   Implants present? Pacemaker   Lower Extremity Weight Bearing Restrictions   Right Lower Extremity Weight Bearing Weight Bearing As Tolerated   Left Lower Extremity Weight Bearing Weight Bearing As Tolerated   Pain Screening   Patient Currently in Pain Yes   Pain Assessment   Pain Assessment 0-10   Pain Level 6   Patient's Stated Pain Goal No pain   Pain Type Surgical pain   Pain Location Back;Leg   Pain Orientation Right;Left;Posterior   Pain Descriptors Aching;Sore;Discomfort   Pain Frequency Continuous   Pain Onset On-going   Clinical Progression Not changed   Functional Pain Assessment Prevents or interferes some active activities and ADLs   Non-Pharmaceutical Pain Intervention(s) Rest   Bed Mobility   Rolling Minimal assistance   Sit to Supine Maximal assistance   Comment pt requires assist to bend knee to assit with roll and able to reach and grab bedrail to pull self to roll and hold while performing pericare    Transfers   Bed to Chair Dependent/Total   Comment kamlesh steady from recliner to bed    Exercises   Hip Flexion x10 AAROM BLE   Hip Extension/Leg Presses x10 AAROM BLE   Hip Abduction x10 BLE AAROM   Ankle Pumps X10 BLE   Comments much improved effort with active movement in BLE. Conditions Requiring Skilled Therapeutic Intervention   Assessment much improved participation and tolerance of sitting in recliner from this am. pt recieved new air mattress, making it difficult to use sliding board and pt really fatigued. pt brings iimproved effort with suine BLE exercises with improved AAROM and able to demonstrate some improved strength. will continue to progress as tolerated and per POC. Activity Tolerance   Activity Tolerance Patient limited by fatigue;Patient Tolerated treatment well   Safety Devices   Type of devices Left in bed;Nurse notified; Bed alarm in place; Patient at risk for falls; All fall risk precautions in place

## 2021-11-03 NOTE — PROGRESS NOTES
Occupational Therapy     11/03/21 1430   Pre Treatment Pain Screening   Intervention List Patient able to continue with treatment   Restrictions/Precautions   Restrictions/Precautions Weight Bearing; Fall Risk   Required Braces or Orthoses? Yes   Implants present? Pacemaker   Lower Extremity Weight Bearing Restrictions   Right Lower Extremity Weight Bearing Weight Bearing As Tolerated   Left Lower Extremity Weight Bearing Weight Bearing As Tolerated   Pain Assessment   Patient Currently in Pain Yes   Pain Assessment 0-10   Pain Level 6   Pain Type Surgical pain   Pain Location Back;Leg   Pain Orientation Right;Left;Posterior   Pain Descriptors Aching;Sore;Discomfort   Pain Frequency Continuous   Clinical Progression Not changed   Patient's Stated Pain Goal No pain   Balance   Sitting Balance Minimal assistance   Standing Balance Dependent/Total  (In kamlesh stedy.)   Transfers   Sit to stand Dependent/Total   Stand to sit Dependent/Total   Transfer Comments Used kamlesh stedy, recliner>bed. Type of ROM/Therapeutic Exercise   Type of ROM/Therapeutic Exercise Cane/Wand   Comment 1# 1 set x 20 reps. Assessment   Performance deficits / Impairments Decreased functional mobility ; Decreased ADL status; Decreased strength;Decreased high-level IADLs;Decreased balance;Decreased endurance;Decreased cognition   Treatment Diagnosis [facility] Balloon Kyphoplasty at T12 on 10/26/2021, R hip wound,MSSA Bactermia/status post myocutaneous and fasciocutaneous flap advancements for coverage of a chronic right hip wound/s/p I&D of right hip wound/s/p kyphoplasty   Prognosis Good   Timed Code Treatment Minutes 45 Minutes   Activity Tolerance   Activity Tolerance Patient Tolerated treatment well   Safety Devices   Safety Devices in place Yes   Type of devices Call light within reach; Bed alarm in place   Plan   Current Treatment Recommendations Strengthening;Balance Training;Functional Mobility Training; Endurance Training; Safety Education & Training;Patient/Caregiver Education & Training;Equipment Evaluation, Education, & procurement;Self-Care / ADL; Home Management Training      11/03/21 [address] Needed Dependent   Comment Pt. able to assist some with john hygiene, incontinent of bladder, completed supine.    CARE Score 1

## 2021-11-03 NOTE — PROGRESS NOTES
Infectious Diseases Progress Note    Patient:  Amador Napier  YOB: 1955  MRN: [id number]   Admit date: 10/28/2021   Admitting Physician: ePrry Giraldo MD  Primary Care Physician: Jaden Ram MD    Chief Complaint/Interval History: She complains of not being able to move her legs. She indicates she can feel her legs. She is more somnolent and more confused than her baseline. When I came in the room this morning her TV was on and the lights were on her were room. She indicates they were on all night. Asked if she wanted to that when she indicated yes. I asked if she was sleeping and she indicated no. I told her I thought she was somewhat sedated and that she may be getting slightly too much pain medicine. She indicates it just hurts. When asked to localize it she says all over. Get a tendency to think it is more in the right upper thigh area, but again somewhat hard to localize. She did not describe back pain. In/Out    Intake/Output Summary (Last 24 hours) at 11/3/2021 0601  Last data filed at 11/2/2021 1812  Gross per 24 hour   Intake 610 ml   Output 300 ml   Net 310 ml     Allergies:    Allergies   Allergen Reactions    Amoxicillin Rash    Lipitor Rash    Niaspan [Niacin Er] Rash    Penicillins Rash    Relafen [Nabumetone] Rash    Zocor [Simvastatin] Rash     Muscle cramps     Current Meds: polyethylene glycol (GLYCOLAX) packet 17 g, Daily  oxyCODONE-acetaminophen (PERCOCET) 7.5-325 MG per tablet 1 tablet, Q4H PRN  cloNIDine (CATAPRES) tablet 0.1 mg, Q4H PRN  lisinopril (PRINIVIL;ZESTRIL) tablet 10 mg, Daily  acetaminophen (TYLENOL) tablet 650 mg, Q4H PRN  aspirin EC tablet 81 mg, Daily  carvedilol (COREG) tablet 25 mg, BID WC  docusate sodium (COLACE) capsule 100 mg, Daily  ferrous sulfate (IRON 325) tablet 325 mg, Daily with breakfast  fluticasone (FLONASE) 50 MCG/ACT nasal spray 1 spray, Daily PRN  folic acid (FOLVITE) tablet 1 mg, Daily  furosemide (LASIX) tablet 40 mg, Daily  isosorbide mononitrate (IMDUR) extended release tablet 60 mg, BID  levothyroxine (SYNTHROID) tablet 75 mcg, Daily  lidocaine 4 % external patch 1 patch, Daily  magnesium oxide (MAG-OX) tablet 400 mg, Daily  therapeutic multivitamin-minerals 1 tablet, Daily  naloxegol (MOVANTIK) tablet 25 mg, QAM  nitroGLYCERIN (NITROSTAT) SL tablet 0.4 mg, Q5 Min PRN  pantoprazole (PROTONIX) tablet 40 mg, Daily  potassium chloride (KLOR-CON M) extended release tablet 20 mEq, BID  rivaroxaban (XARELTO) tablet 20 mg, Daily  salsalate (DISALCID) tablet 500 mg, Daily  sucralfate (CARAFATE) tablet 1 g, 4x Daily PC & HS  valACYclovir (VALTREX) tablet 500 mg, Daily  thiamine mononitrate tablet 250 mg, Daily  acetaminophen (TYLENOL) tablet 650 mg, Q4H PRN  polyethylene glycol (GLYCOLAX) packet 17 g, Daily PRN  ceFAZolin (ANCEF) 2000 mg in dextrose 4 % 100 mL IVPB (premix), Q8H  lactobacillus (CULTURELLE) capsule 1 capsule, Daily  sodium chloride flush 0.9 % injection 5-40 mL, BID  sodium chloride flush 0.9 % injection 5-40 mL, PRN      Review of Systems no cardiopulmonary symptoms    VitalSigns:  BP (!) 188/60   Pulse 71   Temp 95.9 °F (35.5 °C)   Resp 16   Ht 5' 6\" (1.676 m)   Wt 164 lb (74.4 kg)   SpO2 97%   BMI 26.47 kg/m²      Physical Exam  Line/IV site: No erythema, warmth, induration, or tenderness. Lungs clear without crackles  Heart without murmur  Pacer site left upper chest without erythema or swelling  With a lot of encouragement I can get her to move both legs although she does not lift them off the bed. When I asked sister she seems to be able to flex and extend at both hips, both knees, and at both feet. She can clearly dorsi and plantarflex both feet on her own. She seems a little bit weaker in the right leg relative to the left but it is hard to tell. There is no clonus with forced dorsiflexion of either foot. She had intact sensation to touch in both lower extremities.     Lab Results:  CBC:   Recent Labs     11/01/21  0330   WBC 6.5   HGB 9.5*        BMP:  Recent Labs     11/01/21  0330   *   K 5.0   CL 95*   CO2 29   BUN 29*   CREATININE 0.7   GLUCOSE 112*     Radiology: None    Additional Studies Reviewed:  None    Impression:  1. MSSA bacteremia under treatment  2. Recent T12 kyphoplasty  3. Right hip wound infection following flap-improving  4. Rheumatoid arthritis  5. Pain and some increased somnolence relative to baseline    Recommendations:  From infection standpoint feel she is stable. She is on cefazolin. Her white blood cell count is normal.  She is without fever. Right hip wound without purulent drainage. There is no surrounding erythema on the right hip. She has no new localizing symptoms of infection. Concerned about her difficulty participating with rehab. She indicates she cannot move her legs well. Part of it almost seems to be mental status related as I assist her encourage her she is able to dorsi plantarflex her feet and she seems to have motor strength in both lower extremities although hard to tell if she he is weak due to difficulty participating or from other mechanical reasons. Would see if her medications could be adjusted to still achieve pain control but have less sedation (may be short course of Toradol to augment her current pain treatment)  She has had recent kyphoplasty-would consider MRI of lumbar and sacral spine to further evaluate to make sure no spinal cord related issue. Will discuss with attending.     Luisa Dye MD

## 2021-11-03 NOTE — PROGRESS NOTES
Nutrition Assessment     Type and Reason for Visit: Reassess    Nutrition Assessment:  Pt continues with poor po intake of meals and ~50% intake of supplements. Pt fixated on being cold and temperature of room during visit. Continue ONS and encouragment of po intake. Malnutrition Assessment:  Malnutrition Status: Severe malnutrition    Current Nutrition Therapies:    ADULT ORAL NUTRITION SUPPLEMENT; Lunch, Dinner; Wound Healing Oral Supplement  ADULT ORAL NUTRITION SUPPLEMENT; Breakfast, Lunch, Dinner; Standard High Calorie/High Protein Oral Supplement  ADULT DIET; Regular    Anthropometric Measures:  · Height: 5' 6\" (167.6 cm)  · Current Body Wt: 164 lb (74.4 kg)   · BMI: 26.5    Nutrition Interventions:   Food and/or Nutrient Delivery:  Continue Current Diet, Continue Oral Nutrition Supplement   Coordination of Nutrition Care:  Continue to monitor while inpatient    Goals:  Po intake 50% or greater of meals and ONS. wound healing. Nutrition Monitoring and Evaluation:   Food/Nutrient Intake Outcomes:  Food and Nutrient Intake, Supplement Intake  Physical Signs/Symptoms Outcomes:  Biochemical Data, Nutrition Focused Physical Findings, Skin, Weight     Discharge Planning:     Too soon to determine     Electronically signed by Christie Abbott MS, RD, LD on 11/3/21 at 3:05 PM CDT    Contact: [phone]

## 2021-11-03 NOTE — PROGRESS NOTES
Occupational Therapy  Facility/Department: [facility] 8 REHAB UNIT  Daily Treatment Note  NAME: Amador Napier  : 1955  MRN: [id number]    Date of Service: 11/3/2021    Discharge Recommendations:  Continue to assess pending progress       Assessment   Performance deficits / Impairments: Decreased functional mobility ; Decreased ADL status; Decreased strength;Decreased high-level IADLs;Decreased balance;Decreased endurance;Decreased cognition  Assessment: Pt more alert during therapy session today, able to carry on conversations, rather than just one word responses. She was able to follow commands with tasks and was setup for self feeding. Pt was also able to complete AROM instead of AAROM. Treatment Diagnosis: KYPHON Balloon Kyphoplasty at T12 on 10/26/2021, R hip wound,MSSA Bactermia/status post myocutaneous and fasciocutaneous flap advancements for coverage of a chronic right hip wound/s/p I&D of right hip wound/s/p kyphoplasty  Prognosis: Good  Activity Tolerance  Activity Tolerance: Patient Tolerated treatment well  Safety Devices  Safety Devices in place: Yes  Type of devices: Left in chair;Call light within reach; Chair alarm in place         Patient Diagnosis(es): There were no encounter diagnoses. has a past medical history of CAD (coronary artery disease), Cellulitis, History of blood transfusion, History of DVT (deep vein thrombosis), History of neutropenia, History of pulmonary fibrosis, Hx of blood clots, Hypercholesterolemia, Hypertension, Intrinsic asthma, Pacemaker, Post op infection, Rheumatoid arteritis ([address] Sinus node dysfunction ([address] Staph aureus infection, Status post placement of implantable loop recorder, Syncope, and Thyroid disease. has a past surgical history that includes Diagnostic Cardiac Cath Lab Procedure; LEEP; Coronary angioplasty with stent ();  Pacemaker insertion (2016); back surgery (2018); pr total knee arthroplasty (Right, 2018); pr incis/drain thigh/knee abscess,deep (Right, 10/23/2018); Cardiac catheterization (5/14/14  MDL); Cardiac catheterization (1/26/15  [facility]DL); pacemaker placement; joint replacement; and Revision total knee arthroplasty (Right, 12/6/2018). Restrictions  Restrictions/Precautions  Restrictions/Precautions: Weight Bearing, Fall Risk  Required Braces or Orthoses?: Yes  Implants present? : Pacemaker  Lower Extremity Weight Bearing Restrictions  Right Lower Extremity Weight Bearing: Weight Bearing As Tolerated  Left Lower Extremity Weight Bearing: Weight Bearing As Tolerated  Required Braces or Orthoses  Spinal: Thoracic Lumbar Sacral Orthotics  Position Activity Restriction  Spinal Precautions: No Bending, No Lifting, No Twisting  Objective          Type of ROM/Therapeutic Exercise  Type of ROM/Therapeutic Exercise: AROM; Free weights  Comment: AROM: 2 sets of 10 proximally, 1# FW distally, 2 sets of 10, PNF patterns with grasping objects with [address]  Specific instructions for Next Treatment: ADL session  Current Treatment Recommendations: Strengthening, Balance Training, Functional Mobility Training, Endurance Training, Safety Education & Training, Patient/Caregiver Education & Training, Equipment Evaluation, Education, & procurement, Self-Care / ADL, Home Management Training       OutComes Score       11/03/21 1100   Eating   Assistance Needed Setup or clean-up assistance   Comment Pt much more alert today and just required assistance with opening containers. No VC needed to initiate or throughout feeding session. CARE Score 5       Goals  Short term goals  Time Frame for Short term goals: 1 week  Short term goal 1: Max A with clothing management/hygiene for toileting. Short term goal 2: Max A with toilet transfers. Short term goal 3: Max A with bathing hygiene. Short term goal 4: Max A with LB dressing using AE. Short term goal 5: Min A with UB dressing.   Short term goal 6: Pt will set at EOB to complete self care tasks, requiring supervision with sitting balance. Long term goals  Time Frame for Long term goals : 2 week  Long term goal 1: Mod A with clothing management/hygiene for toileting. Long term goal 2: Mod A with toilet transfers. Long term goal 3: Mod A with bathing hygiene. Long term goal 4: Mod A with LB dressing using AE. Long term goal 5: Pt will verbalize DME needs. Long term goals 6: Supervision with UB dressing. Patient Goals   Patient goals : Return home independently.        Therapy Time   Individual Concurrent Group Co-treatment   Time In 1100         Time Out 1200         Minutes 60         Timed Code Treatment Minutes: [address], OT

## 2021-11-04 ENCOUNTER — APPOINTMENT (OUTPATIENT)
Dept: CT IMAGING | Facility: HOSPITAL | Age: 66
End: 2021-11-04

## 2021-11-04 ENCOUNTER — HOSPITAL ENCOUNTER (INPATIENT)
Facility: HOSPITAL | Age: 66
LOS: 8 days | Discharge: NURSING, REHAB OR LONG-TERM CARE | End: 2021-11-12
Attending: NEUROLOGICAL SURGERY | Admitting: NEUROLOGICAL SURGERY

## 2021-11-04 DIAGNOSIS — Z74.09 IMPAIRED MOBILITY AND ADLS: ICD-10-CM

## 2021-11-04 DIAGNOSIS — R13.10 DYSPHAGIA, UNSPECIFIED TYPE: ICD-10-CM

## 2021-11-04 DIAGNOSIS — Z74.09 IMPAIRED MOBILITY: ICD-10-CM

## 2021-11-04 DIAGNOSIS — S22.080A T12 COMPRESSION FRACTURE, INITIAL ENCOUNTER (HCC): ICD-10-CM

## 2021-11-04 DIAGNOSIS — G89.29 OTHER CHRONIC PAIN: Primary | ICD-10-CM

## 2021-11-04 DIAGNOSIS — Z78.9 IMPAIRED MOBILITY AND ADLS: ICD-10-CM

## 2021-11-04 PROBLEM — S06.4XAA EPIDURAL HEMATOMA (HCC): Status: ACTIVE | Noted: 2021-11-04

## 2021-11-04 LAB
ABO GROUP BLD: NORMAL
ALBUMIN SERPL-MCNC: 2.9 G/DL (ref 3.5–5.2)
ALBUMIN/GLOB SERPL: 0.7 G/DL
ALP SERPL-CCNC: 151 U/L (ref 39–117)
ALT SERPL W P-5'-P-CCNC: <5 U/L (ref 1–33)
ANION GAP SERPL CALCULATED.3IONS-SCNC: 6 MMOL/L (ref 5–15)
AST SERPL-CCNC: 23 U/L (ref 1–32)
BASOPHILS # BLD AUTO: 0.04 10*3/MM3 (ref 0–0.2)
BASOPHILS NFR BLD AUTO: 0.8 % (ref 0–1.5)
BILIRUB SERPL-MCNC: 0.5 MG/DL (ref 0–1.2)
BLD GP AB SCN SERPL QL: NEGATIVE
BUN SERPL-MCNC: 27 MG/DL (ref 8–23)
BUN/CREAT SERPL: 38 (ref 7–25)
CALCIUM SPEC-SCNC: 9.1 MG/DL (ref 8.6–10.5)
CHLORIDE SERPL-SCNC: 100 MMOL/L (ref 98–107)
CLOSURE TME COLL+EPINEP BLD: NORMAL S
CO2 SERPL-SCNC: 29 MMOL/L (ref 22–29)
CREAT SERPL-MCNC: 0.71 MG/DL (ref 0.57–1)
DEPRECATED RDW RBC AUTO: 58 FL (ref 37–54)
EOSINOPHIL # BLD AUTO: 0.13 10*3/MM3 (ref 0–0.4)
EOSINOPHIL NFR BLD AUTO: 2.7 % (ref 0.3–6.2)
ERYTHROCYTE [DISTWIDTH] IN BLOOD BY AUTOMATED COUNT: 18.5 % (ref 12.3–15.4)
GFR SERPL CREATININE-BSD FRML MDRD: 82 ML/MIN/1.73
GLOBULIN UR ELPH-MCNC: 4.3 GM/DL
GLUCOSE SERPL-MCNC: 100 MG/DL (ref 65–99)
HCT VFR BLD AUTO: 23.7 % (ref 34–46.6)
HGB BLD-MCNC: 7.3 G/DL (ref 12–15.9)
IMM GRANULOCYTES # BLD AUTO: 0.04 10*3/MM3 (ref 0–0.05)
IMM GRANULOCYTES NFR BLD AUTO: 0.8 % (ref 0–0.5)
INR PPP: 1.91 (ref 0.91–1.09)
LYMPHOCYTES # BLD AUTO: 1.63 10*3/MM3 (ref 0.7–3.1)
LYMPHOCYTES NFR BLD AUTO: 33.6 % (ref 19.6–45.3)
Lab: NORMAL
MCH RBC QN AUTO: 26.4 PG (ref 26.6–33)
MCHC RBC AUTO-ENTMCNC: 30.8 G/DL (ref 31.5–35.7)
MCV RBC AUTO: 85.6 FL (ref 79–97)
MONOCYTES # BLD AUTO: 0.65 10*3/MM3 (ref 0.1–0.9)
MONOCYTES NFR BLD AUTO: 13.4 % (ref 5–12)
NEUTROPHILS NFR BLD AUTO: 2.36 10*3/MM3 (ref 1.7–7)
NEUTROPHILS NFR BLD AUTO: 48.7 % (ref 42.7–76)
NRBC BLD AUTO-RTO: 0 /100 WBC (ref 0–0.2)
PLATELET # BLD AUTO: 247 10*3/MM3 (ref 140–450)
PLT THERAPY DRUG: NORMAL
PMV BLD AUTO: 8.8 FL (ref 6–12)
POTASSIUM SERPL-SCNC: 4.8 MMOL/L (ref 3.5–5.2)
PROT SERPL-MCNC: 7.2 G/DL (ref 6–8.5)
PROTHROMBIN TIME: 21.1 SECONDS (ref 11.9–14.6)
RBC # BLD AUTO: 2.77 10*6/MM3 (ref 3.77–5.28)
RH BLD: POSITIVE
SODIUM SERPL-SCNC: 135 MMOL/L (ref 136–145)
T&S EXPIRATION DATE: NORMAL
WBC # BLD AUTO: 4.85 10*3/MM3 (ref 3.4–10.8)

## 2021-11-04 PROCEDURE — 86900 BLOOD TYPING SEROLOGIC ABO: CPT | Performed by: NEUROLOGICAL SURGERY

## 2021-11-04 PROCEDURE — 86901 BLOOD TYPING SEROLOGIC RH(D): CPT

## 2021-11-04 PROCEDURE — 99024 POSTOP FOLLOW-UP VISIT: CPT | Performed by: NEUROLOGICAL SURGERY

## 2021-11-04 PROCEDURE — 86850 RBC ANTIBODY SCREEN: CPT | Performed by: NEUROLOGICAL SURGERY

## 2021-11-04 PROCEDURE — 86927 PLASMA FRESH FROZEN: CPT

## 2021-11-04 PROCEDURE — 85025 COMPLETE CBC W/AUTO DIFF WBC: CPT | Performed by: NEUROLOGICAL SURGERY

## 2021-11-04 PROCEDURE — 86923 COMPATIBILITY TEST ELECTRIC: CPT

## 2021-11-04 PROCEDURE — P9017 PLASMA 1 DONOR FRZ W/IN 8 HR: HCPCS

## 2021-11-04 PROCEDURE — 97162 PT EVAL MOD COMPLEX 30 MIN: CPT | Performed by: PHYSICAL THERAPIST

## 2021-11-04 PROCEDURE — 85610 PROTHROMBIN TIME: CPT | Performed by: NEUROLOGICAL SURGERY

## 2021-11-04 PROCEDURE — 36430 TRANSFUSION BLD/BLD COMPNT: CPT

## 2021-11-04 PROCEDURE — 80053 COMPREHEN METABOLIC PANEL: CPT | Performed by: NEUROLOGICAL SURGERY

## 2021-11-04 PROCEDURE — 85576 BLOOD PLATELET AGGREGATION: CPT | Performed by: NEUROLOGICAL SURGERY

## 2021-11-04 PROCEDURE — 25010000002 CEFAZOLIN PER 500 MG: Performed by: NEUROLOGICAL SURGERY

## 2021-11-04 PROCEDURE — 86900 BLOOD TYPING SEROLOGIC ABO: CPT

## 2021-11-04 PROCEDURE — 72131 CT LUMBAR SPINE W/O DYE: CPT

## 2021-11-04 PROCEDURE — 86901 BLOOD TYPING SEROLOGIC RH(D): CPT | Performed by: NEUROLOGICAL SURGERY

## 2021-11-04 PROCEDURE — 97166 OT EVAL MOD COMPLEX 45 MIN: CPT

## 2021-11-04 RX ORDER — CARVEDILOL 25 MG/1
25 TABLET ORAL 2 TIMES DAILY WITH MEALS
Status: DISCONTINUED | OUTPATIENT
Start: 2021-11-04 | End: 2021-11-12 | Stop reason: HOSPADM

## 2021-11-04 RX ORDER — ACETAMINOPHEN 325 MG/1
650 TABLET ORAL EVERY 6 HOURS PRN
Status: ON HOLD | COMMUNITY
End: 2022-04-23

## 2021-11-04 RX ORDER — VALACYCLOVIR HYDROCHLORIDE 500 MG/1
500 TABLET, FILM COATED ORAL
Status: DISCONTINUED | OUTPATIENT
Start: 2021-11-04 | End: 2021-11-12 | Stop reason: HOSPADM

## 2021-11-04 RX ORDER — LIDOCAINE 50 MG/G
1 PATCH TOPICAL DAILY PRN
Status: DISCONTINUED | OUTPATIENT
Start: 2021-11-04 | End: 2021-11-12 | Stop reason: HOSPADM

## 2021-11-04 RX ORDER — HYDROCODONE BITARTRATE AND ACETAMINOPHEN 7.5; 325 MG/1; MG/1
1 TABLET ORAL EVERY 4 HOURS PRN
Status: DISCONTINUED | OUTPATIENT
Start: 2021-11-04 | End: 2021-11-04 | Stop reason: SDUPTHER

## 2021-11-04 RX ORDER — ISOSORBIDE MONONITRATE 60 MG/1
60 TABLET, EXTENDED RELEASE ORAL 2 TIMES DAILY
Status: DISCONTINUED | OUTPATIENT
Start: 2021-11-04 | End: 2021-11-12 | Stop reason: HOSPADM

## 2021-11-04 RX ORDER — SODIUM CHLORIDE 0.9 % (FLUSH) 0.9 %
10 SYRINGE (ML) INJECTION AS NEEDED
Status: DISCONTINUED | OUTPATIENT
Start: 2021-11-04 | End: 2021-11-12 | Stop reason: HOSPADM

## 2021-11-04 RX ORDER — ACETAMINOPHEN 160 MG/5ML
650 SOLUTION ORAL EVERY 4 HOURS PRN
Status: DISCONTINUED | OUTPATIENT
Start: 2021-11-04 | End: 2021-11-12 | Stop reason: HOSPADM

## 2021-11-04 RX ORDER — MAGNESIUM OXIDE 400 MG/1
400 TABLET ORAL DAILY
Status: DISCONTINUED | OUTPATIENT
Start: 2021-11-04 | End: 2021-11-12 | Stop reason: HOSPADM

## 2021-11-04 RX ORDER — POLYETHYLENE GLYCOL 3350 17 G/17G
17 POWDER, FOR SOLUTION ORAL DAILY
Status: DISCONTINUED | OUTPATIENT
Start: 2021-11-04 | End: 2021-11-12 | Stop reason: HOSPADM

## 2021-11-04 RX ORDER — BISACODYL 5 MG/1
5 TABLET, DELAYED RELEASE ORAL DAILY PRN
Status: DISCONTINUED | OUTPATIENT
Start: 2021-11-04 | End: 2021-11-12 | Stop reason: HOSPADM

## 2021-11-04 RX ORDER — ONDANSETRON 4 MG/1
4 TABLET, FILM COATED ORAL EVERY 6 HOURS PRN
Status: DISCONTINUED | OUTPATIENT
Start: 2021-11-04 | End: 2021-11-12 | Stop reason: HOSPADM

## 2021-11-04 RX ORDER — FLUTICASONE PROPIONATE 50 MCG
1 SPRAY, SUSPENSION (ML) NASAL 2 TIMES DAILY
Status: DISCONTINUED | OUTPATIENT
Start: 2021-11-04 | End: 2021-11-12 | Stop reason: HOSPADM

## 2021-11-04 RX ORDER — FOLIC ACID 1 MG/1
1 TABLET ORAL DAILY
Status: DISCONTINUED | OUTPATIENT
Start: 2021-11-04 | End: 2021-11-12 | Stop reason: HOSPADM

## 2021-11-04 RX ORDER — ACETAMINOPHEN 325 MG/1
650 TABLET ORAL EVERY 4 HOURS PRN
Status: DISCONTINUED | OUTPATIENT
Start: 2021-11-04 | End: 2021-11-12 | Stop reason: HOSPADM

## 2021-11-04 RX ORDER — DOCUSATE SODIUM 100 MG/1
100 CAPSULE, LIQUID FILLED ORAL 2 TIMES DAILY
Status: DISCONTINUED | OUTPATIENT
Start: 2021-11-04 | End: 2021-11-12 | Stop reason: HOSPADM

## 2021-11-04 RX ORDER — SACCHAROMYCES BOULARDII 250 MG
250 CAPSULE ORAL DAILY
Status: DISCONTINUED | OUTPATIENT
Start: 2021-11-04 | End: 2021-11-12 | Stop reason: HOSPADM

## 2021-11-04 RX ORDER — NITROGLYCERIN 0.4 MG/1
0.4 TABLET SUBLINGUAL
Status: DISCONTINUED | OUTPATIENT
Start: 2021-11-04 | End: 2021-11-12 | Stop reason: HOSPADM

## 2021-11-04 RX ORDER — FERROUS SULFATE 325(65) MG
325 TABLET ORAL
Status: DISCONTINUED | OUTPATIENT
Start: 2021-11-04 | End: 2021-11-12 | Stop reason: HOSPADM

## 2021-11-04 RX ORDER — TRAMADOL HYDROCHLORIDE 50 MG/1
50 TABLET ORAL 3 TIMES DAILY
Status: DISCONTINUED | OUTPATIENT
Start: 2021-11-04 | End: 2021-11-06

## 2021-11-04 RX ORDER — POTASSIUM CHLORIDE 750 MG/1
20 CAPSULE, EXTENDED RELEASE ORAL 2 TIMES DAILY
Status: DISCONTINUED | OUTPATIENT
Start: 2021-11-04 | End: 2021-11-12 | Stop reason: HOSPADM

## 2021-11-04 RX ORDER — HYDROCODONE BITARTRATE AND ACETAMINOPHEN 7.5; 325 MG/1; MG/1
1 TABLET ORAL EVERY 4 HOURS PRN
Status: DISPENSED | OUTPATIENT
Start: 2021-11-04 | End: 2021-11-11

## 2021-11-04 RX ORDER — SUCRALFATE 1 G/1
1 TABLET ORAL
Status: DISCONTINUED | OUTPATIENT
Start: 2021-11-04 | End: 2021-11-12 | Stop reason: HOSPADM

## 2021-11-04 RX ORDER — HYDROCODONE BITARTRATE AND ACETAMINOPHEN 10; 325 MG/1; MG/1
1 TABLET ORAL EVERY 4 HOURS PRN
Status: DISPENSED | OUTPATIENT
Start: 2021-11-04 | End: 2021-11-11

## 2021-11-04 RX ORDER — SODIUM CHLORIDE 0.9 % (FLUSH) 0.9 %
10 SYRINGE (ML) INJECTION EVERY 12 HOURS SCHEDULED
Status: DISCONTINUED | OUTPATIENT
Start: 2021-11-04 | End: 2021-11-12 | Stop reason: HOSPADM

## 2021-11-04 RX ORDER — ONDANSETRON 2 MG/ML
4 INJECTION INTRAMUSCULAR; INTRAVENOUS EVERY 6 HOURS PRN
Status: DISCONTINUED | OUTPATIENT
Start: 2021-11-04 | End: 2021-11-12 | Stop reason: HOSPADM

## 2021-11-04 RX ORDER — BISACODYL 10 MG
10 SUPPOSITORY, RECTAL RECTAL DAILY PRN
Status: DISCONTINUED | OUTPATIENT
Start: 2021-11-04 | End: 2021-11-12 | Stop reason: HOSPADM

## 2021-11-04 RX ORDER — ACETAMINOPHEN 650 MG/1
650 SUPPOSITORY RECTAL EVERY 4 HOURS PRN
Status: DISCONTINUED | OUTPATIENT
Start: 2021-11-04 | End: 2021-11-12 | Stop reason: HOSPADM

## 2021-11-04 RX ORDER — BUPIVACAINE HCL/0.9 % NACL/PF 0.1 %
2 PLASTIC BAG, INJECTION (ML) EPIDURAL EVERY 8 HOURS
Status: DISCONTINUED | OUTPATIENT
Start: 2021-11-04 | End: 2021-11-12 | Stop reason: HOSPADM

## 2021-11-04 RX ORDER — LEVOTHYROXINE SODIUM 0.07 MG/1
75 TABLET ORAL
Status: DISCONTINUED | OUTPATIENT
Start: 2021-11-04 | End: 2021-11-12 | Stop reason: HOSPADM

## 2021-11-04 RX ORDER — POTASSIUM CHLORIDE 1.5 G/1.77G
20 POWDER, FOR SOLUTION ORAL 2 TIMES DAILY
Status: DISCONTINUED | OUTPATIENT
Start: 2021-11-04 | End: 2021-11-04 | Stop reason: SDUPTHER

## 2021-11-04 RX ORDER — FUROSEMIDE 40 MG/1
40 TABLET ORAL DAILY
Status: DISCONTINUED | OUTPATIENT
Start: 2021-11-04 | End: 2021-11-12 | Stop reason: HOSPADM

## 2021-11-04 RX ORDER — ASPIRIN 81 MG/1
81 TABLET ORAL DAILY
COMMUNITY

## 2021-11-04 RX ORDER — AMOXICILLIN 250 MG
2 CAPSULE ORAL 2 TIMES DAILY
Status: DISCONTINUED | OUTPATIENT
Start: 2021-11-04 | End: 2021-11-12 | Stop reason: HOSPADM

## 2021-11-04 RX ORDER — FAMOTIDINE 40 MG/1
40 TABLET, FILM COATED ORAL DAILY
Status: ON HOLD | COMMUNITY
End: 2022-04-23

## 2021-11-04 RX ORDER — PANTOPRAZOLE SODIUM 40 MG/1
40 TABLET, DELAYED RELEASE ORAL DAILY
Status: DISCONTINUED | OUTPATIENT
Start: 2021-11-04 | End: 2021-11-12 | Stop reason: HOSPADM

## 2021-11-04 RX ORDER — POLYETHYLENE GLYCOL 3350 17 G/17G
17 POWDER, FOR SOLUTION ORAL DAILY PRN
Status: DISCONTINUED | OUTPATIENT
Start: 2021-11-04 | End: 2021-11-12 | Stop reason: HOSPADM

## 2021-11-04 RX ADMIN — SODIUM CHLORIDE, PRESERVATIVE FREE 10 ML: 5 INJECTION INTRAVENOUS at 21:13

## 2021-11-04 RX ADMIN — CARVEDILOL 25 MG: 25 TABLET, FILM COATED ORAL at 17:08

## 2021-11-04 RX ADMIN — CEFAZOLIN 2 G: 10 INJECTION, POWDER, FOR SOLUTION INTRAVENOUS at 17:08

## 2021-11-04 RX ADMIN — FLUTICASONE PROPIONATE 1 SPRAY: 50 SPRAY, METERED NASAL at 21:11

## 2021-11-04 RX ADMIN — SUCRALFATE 1 G: 1 TABLET ORAL at 17:08

## 2021-11-04 RX ADMIN — PANTOPRAZOLE SODIUM 40 MG: 40 TABLET, DELAYED RELEASE ORAL at 09:20

## 2021-11-04 RX ADMIN — VALACYCLOVIR HYDROCHLORIDE 500 MG: 500 TABLET, FILM COATED ORAL at 09:21

## 2021-11-04 RX ADMIN — SUCRALFATE 1 G: 1 TABLET ORAL at 12:45

## 2021-11-04 RX ADMIN — LEVOTHYROXINE SODIUM 75 MCG: 75 TABLET ORAL at 09:21

## 2021-11-04 RX ADMIN — FOLIC ACID 1 MG: 1 TABLET ORAL at 09:21

## 2021-11-04 RX ADMIN — ISOSORBIDE MONONITRATE 60 MG: 60 TABLET, EXTENDED RELEASE ORAL at 09:20

## 2021-11-04 RX ADMIN — FUROSEMIDE 40 MG: 40 TABLET ORAL at 09:21

## 2021-11-04 RX ADMIN — THIAMINE HCL TAB 100 MG 250 MG: 100 TAB at 09:19

## 2021-11-04 RX ADMIN — FLUTICASONE PROPIONATE 1 SPRAY: 50 SPRAY, METERED NASAL at 09:26

## 2021-11-04 RX ADMIN — FERROUS SULFATE TAB 325 MG (65 MG ELEMENTAL FE) 325 MG: 325 (65 FE) TAB at 09:21

## 2021-11-04 RX ADMIN — SODIUM CHLORIDE, PRESERVATIVE FREE 10 ML: 5 INJECTION INTRAVENOUS at 09:32

## 2021-11-04 RX ADMIN — HYDROCODONE BITARTRATE AND ACETAMINOPHEN 1 TABLET: 7.5; 325 TABLET ORAL at 13:56

## 2021-11-04 RX ADMIN — LIDOCAINE 1 PATCH: 50 PATCH CUTANEOUS at 10:24

## 2021-11-04 RX ADMIN — MAGNESIUM GLUCONATE 500 MG ORAL TABLET 400 MG: 500 TABLET ORAL at 10:24

## 2021-11-04 RX ADMIN — CEFAZOLIN 2 G: 10 INJECTION, POWDER, FOR SOLUTION INTRAVENOUS at 09:25

## 2021-11-04 RX ADMIN — SUCRALFATE 1 G: 1 TABLET ORAL at 21:10

## 2021-11-04 RX ADMIN — POTASSIUM CHLORIDE 20 MEQ: 10 CAPSULE, COATED, EXTENDED RELEASE ORAL at 22:26

## 2021-11-04 RX ADMIN — ISOSORBIDE MONONITRATE 60 MG: 60 TABLET, EXTENDED RELEASE ORAL at 21:11

## 2021-11-04 RX ADMIN — HYDROCODONE BITARTRATE AND ACETAMINOPHEN 1 TABLET: 7.5; 325 TABLET ORAL at 01:56

## 2021-11-04 RX ADMIN — DOCUSATE SODIUM 50 MG AND SENNOSIDES 8.6 MG 2 TABLET: 8.6; 5 TABLET, FILM COATED ORAL at 21:11

## 2021-11-04 RX ADMIN — DOCUSATE SODIUM 50 MG AND SENNOSIDES 8.6 MG 2 TABLET: 8.6; 5 TABLET, FILM COATED ORAL at 09:20

## 2021-11-04 RX ADMIN — ACETAMINOPHEN 650 MG: 325 TABLET, FILM COATED ORAL at 16:22

## 2021-11-04 RX ADMIN — HYDROCODONE BITARTRATE AND ACETAMINOPHEN 1 TABLET: 7.5; 325 TABLET ORAL at 09:25

## 2021-11-04 RX ADMIN — CARVEDILOL 25 MG: 25 TABLET, FILM COATED ORAL at 09:20

## 2021-11-04 RX ADMIN — POTASSIUM CHLORIDE 20 MEQ: 1.5 POWDER, FOR SOLUTION ORAL at 09:22

## 2021-11-04 RX ADMIN — DOCUSATE SODIUM 100 MG: 100 CAPSULE, LIQUID FILLED ORAL at 21:11

## 2021-11-04 RX ADMIN — DOCUSATE SODIUM 100 MG: 100 CAPSULE, LIQUID FILLED ORAL at 09:20

## 2021-11-04 RX ADMIN — TRAMADOL HYDROCHLORIDE 50 MG: 50 TABLET ORAL at 09:19

## 2021-11-04 RX ADMIN — POLYETHYLENE GLYCOL 3350 17 G: 17 POWDER, FOR SOLUTION ORAL at 09:29

## 2021-11-04 RX ADMIN — Medication 250 MG: at 09:21

## 2021-11-04 NOTE — PROGRESS NOTES
Pt discharged and transferred to [facility] per EMS. Pt comfortable on discharge.  Belongings sent with pt Family updated

## 2021-11-04 NOTE — DISCHARGE SUMMARY
Neurology Discharge Summary     Patient Identification:  Shadia Modi is a 74 y.o. female. :  1955  Admit Date:  10/28/2021  Discharge date : November 3, 2021   Attending Provider: Tasha Yanez MD     Account Number: [de-identified]                                   Admission Diagnoses:   MSSA bacteremia [R78.81, B95.61]  UTI (urinary tract infection) [N39.0]  T12 compression fracture ([facility] [F64.349G]    Discharge Diagnoses:  Principal Problem:    MSSA bacteremia  Active Problems:    Coronary artery disease of native artery of native heart with stable angina pectoris ([facility]    Hypertension    Rheumatoid arteritis ([facility]    History of pulmonary fibrosis    History of DVT (deep vein thrombosis)    Thyroid disease    Hypercholesterolemia    S/P coronary artery stent placement    Pacemaker    Lumbar stenosis with neurogenic claudication    Primary osteoarthritis of right knee    Discitis    Acute cystitis without hematuria    Closed wedge compression fracture of T2 vertebra with routine healing    S/P kyphoplasty    Acute midline thoracic back pain    Anemia    Severe malnutrition (HCC)  Resolved Problems:    * No resolved hospital problems.  *      Discharge Medications:    Current Discharge Medication List           Details   lisinopril (PRINIVIL;ZESTRIL) 10 MG tablet Take 1 tablet by mouth daily  Qty: 30 tablet, Refills: 0      ceFAZolin (ANCEF) 2-4 GM/100ML-% SOLN IVPB (premix) Infuse 100 mLs intravenously every 8 hours for 24 doses  Qty: 3000 mL, Refills: 0              Details   ferrous sulfate (IRON 325) 325 (65 Fe) MG tablet Take 325 mg by mouth daily (with breakfast)      fluticasone (FLONASE) 50 MCG/ACT nasal spray 1 spray by Each Nostril route daily as needed for Rhinitis      folic acid (FOLVITE) 1 MG tablet Take 1 mg by mouth daily      magnesium oxide (MAG-OX) 400 MG tablet Take 400 mg by mouth daily      Potassium Chloride (KLOR-CON PO) Take 20 mEq by mouth 2 times daily      saccharomyces boulardii (FLORASTOR) 250 MG capsule Take 250 mg by mouth daily      salsalate (DISALCID) 500 MG tablet Take 500 mg by mouth daily Take 5 tablets po Monday, Wednesdays, and Fridays,  Take 4 tablets po on Tuesdays, Thursdays, and Sundays      sucralfate (CARAFATE) 1 GM tablet Take 1 g by mouth 4 times daily (after meals and at bedtime)      vitamin B-1 (THIAMINE) 100 MG tablet Take 250 mg by mouth daily      lidocaine (LIDODERM) 5 % Place 1 patch onto the skin daily as needed for Pain 12 hours on, 12 hours off.      naloxegol (MOVANTIK) 25 MG TABS tablet Take 1 tablet by mouth every morning  Qty: 30 tablet, Refills: 0      Multiple Vitamins-Minerals (THERAPEUTIC MULTIVITAMIN-MINERALS) tablet Take 1 tablet by mouth daily      docusate sodium (COLACE) 100 MG capsule Take 1 capsule by mouth daily To prevent constipation  Qty: 20 capsule, Refills: 0      pantoprazole (PROTONIX) 40 MG tablet Take 1 tablet by mouth daily  Qty: 30 tablet, Refills: 3      levothyroxine (SYNTHROID) 75 MCG tablet Take 75 mcg by mouth Daily       furosemide (LASIX) 40 MG tablet Take 40 mg by mouth daily.       carvedilol (COREG) 25 MG tablet Take 1 tablet by mouth 2 times daily (with meals)  Qty: 60 tablet, Refills: 3      isosorbide mononitrate (IMDUR) 60 MG extended release tablet TAKE 1 TABLET BY MOUTH TWICE DAILY  Qty: 180 tablet, Refills: 0    Comments: **Patient requests 90 days supply**      nitroGLYCERIN (NITROSTAT) 0.4 MG SL tablet Place 1 tablet under the tongue every 5 minutes as needed for Chest pain  Qty: 25 tablet, Refills: 3    Associated Diagnoses: Essential hypertension      valACYclovir (VALTREX) 500 MG tablet Take 1 tablet by mouth daily  Qty: 30 tablet, Refills: 0           Current Discharge Medication List      STOP taking these medications       ceFAZolin Sodium (ANCEF IV) Comments:   Reason for Stopping:         HYDROcodone-acetaminophen (NORCO) 7.5-325 MG per tablet Comments:   Reason for Stopping:         acetaminophen (TYLENOL) 325 MG tablet Comments:   Reason for Stopping:         aspirin EC 81 MG EC tablet Comments:   Reason for Stopping:         rivaroxaban (XARELTO) 20 MG TABS tablet Comments:   Reason for Stopping:         traMADol (ULTRAM) 50 MG tablet Comments:   Reason for Stopping:         gabapentin (NEURONTIN) 300 MG capsule Comments:   Reason for Stopping:         gabapentin (NEURONTIN) 100 MG capsule Comments:   Reason for Stopping:         magnesium oxide (MAG-OX) 400 (240 Mg) MG tablet Comments:   Reason for Stopping:         potassium chloride (KLOR-CON M) 20 MEQ extended release tablet Comments:   Reason for Stopping:         guaiFENesin (MUCINEX) 600 MG extended release tablet Comments:   Reason for Stopping:         bisacodyl (DULCOLAX) 10 MG suppository Comments:   Reason for Stopping:         budesonide (PULMICORT) 0.5 MG/2ML nebulizer suspension Comments:   Reason for Stopping:         cyclobenzaprine (FLEXERIL) 10 MG tablet Comments:   Reason for Stopping:         Heparin Sodium, Porcine, (HEPARIN, PORCINE,) 5000 UNIT/ML injection Comments:   Reason for Stopping:         magnesium hydroxide (MILK OF MAGNESIA CONCENTRATE) 2400 MG/10ML SUSP Comments:   Reason for Stopping:         naloxone (NARCAN) 0.4 MG/ML injection Comments:   Reason for Stopping:         ondansetron (ZOFRAN) 4 MG tablet Comments:   Reason for Stopping:         polyethylene glycol (GLYCOLAX) 17 g packet Comments:   Reason for Stopping:                 Consults:   ID    Hospital Course: This 74 y.o. female admitted to [facility] on 10/21/2021 after suffering a fall off of her bed at home on 10/18/2021. She presented to the ED on the 10/21 with complaint of back pain. Imaging revealed a T12 compression. Initially plan was to treat patient conservatively with TLSO brace. Subsequent workup identified staph bacteremia, confirmed by 2 of 2 peripheral blood cultures, and a UTI with 4+ bacteria.  Out of concern that the UTI might not represent the primary source of the bacteremia, additional workup was appropriately performed.  CT imaging of the right hip subsequently identified a fluid collection and soft tissue induration, which triggered concern for an abscess. Mrs. Keyla Hatch is status post myocutaneous and fasciocutaneous flap advancements for coverage of a chronic right hip wound.  Incidentally, tissue cultures were sent at the time of her coverage procedure, and these resulted in no growth. Understanding the patient's prior surgical history, the findings on CT were not felt to be unexpected. [place] the circumstances of bacteremia; however, ruling out abscess or infected seroma pocket was necessary.  Even if the fluid collection did not represent the etiology of the bacteremia, there was concern that it could have potentially become seeded. Examination of the right hip was reassuring.  There was no cellulitis or warmth.  The surgical incision was well healed.  The patient reported that she has been ambulating without difficulty.  She has no tenderness to palpation over the fluid collection. Although it is essential to rule out infection at this site, it was felt to be premature to take the patient to the OR and re-create a wound that she previously struggled to manage for 7 months. On 10/24,  CT imaging indicated a small fluid collection at the right hip at the location of her previous wound.  This was aspirated and Gram stain showed presence of gram-positive cocci. Decision was made to proceed with I&D.  Patient tolerated procedure well. Infectious disease was consulted for antibiotic management. Regarding her T12 compression fx, she has been unresponsive to nonoperative treatment modalities including analgesics including opioids, lidocaine patch, nonsteroidal anti-inflammatories, and muscle relaxants as well as bracing with TLSO. These fractures were sustained with minimal trauma and he has low bone density consistent with osteoporosis. Based on these findings, decision was made to perform [facility] Balloon Kyphoplasty at T12 on 10/26/2021 by Dr Nathan Dos Santos. She tolerated procedure well. She was medically stable and was participating with therapy. She was ready to begin rehab with plan of returning home after rehab dc with support from her 24/7 caregiver and family. On admission the patient had limited movement in her legs which she indicated was related to her back and hip pain. She had no significant improvement in rehab and needed full support and continued to complain of right hip and back pain. She was followed by Dr Leonor Ndiaye who recommended continuing antibiotics as noted. Due to continued pain in the right hip and back pain, MRI of the right hip and LS spine were obtained. MRI of the LS spine was significant for an epidural hematoma with cord compression worse at L1. MRI of the right hip had evidence of a rim enhancing fluid collection in the right femur with abscess in the differential. There was fluid in the joint space in which infection could not be ruled out. Osteomyelitis was also a consideration based on imaging. Her ASA and Xarelto were held. Case was discussed with Dr Nathan Dos Santos with plan to transfer back to [facility] for monitoring of epidural hematoma off anticoagulation and evaluation of possible right hip infection. In addition she did have some confusion during her stay and pain medicines were changed, reduced and eventually stopped to see if this would improve her cognitive function. She will have a asif catheter placed for urinary retention. Stat covid test to be performed prior to transfer. Discharge Instructions     Patient Instructions: To [facility]  Therapy orders: PT, OT, SLP and nursing   Discharge lab work: none  Code status: Full Code   Activity: bedrest  Diet: ADULT ORAL NUTRITION SUPPLEMENT; Lunch, Dinner;  Wound Healing Oral Supplement  ADULT ORAL NUTRITION SUPPLEMENT; Breakfast, Lunch, Dinner; Standard High Calorie/High Protein Oral Supplement  ADULT DIET;  Regular    Wound Care: as directed  Equipment: as per therapy      Matthew Lora MD, MD    At least 35 minutes were spent in discharging the patient

## 2021-11-04 NOTE — PLAN OF CARE
Problem: Adult Inpatient Plan of Care  Goal: Plan of Care Review  Outcome: Ongoing, Progressing  Flowsheets (Taken 11/4/2021 8912)  Progress: no change  Plan of Care Reviewed With: patient  Outcome Summary: Pt is A&Ox4. PRn pain meds given with good relief. 2 units of FFP given, pt tolerated well. Numbness in hip area per pt. unable to move legs, can only plantar flex. no neuro changes. VSS. Safety maintained.

## 2021-11-04 NOTE — PROGRESS NOTES
Dr. Cheyenne Ruffin from radiology called me with critical MRI findings. Dr. Ellyn Canavan notified and Dr. Ophelia Zhang neurosurgery is consulted and notified. Waiting for further orders.

## 2021-11-04 NOTE — DISCHARGE SUMMARY
Physical Therapy DISCHARGE Note  DATE:  2021  NAME:  Dawne Aase  :  1955  (66 y.o.,female)  MRN:  [id number]    HEIGHT:  Height: 5' 6\" (167.6 cm)  WEIGHT:  Weight: 164 lb (74.4 kg)    PATIENT DIAGNOSIS(ES):    Diagnosis: T12 compression fx s/p kyphoplasty, I&D right hip     Additional Pertinent Hx: lumbar decompression/fusion, C6 corpectomy, L1-2 disectomy, I&D psoas abscess, L charcot ankle/foot, HTN, RA, spinal stenosis, cervical disc myelopathy, chronic emboism and thrombosis DVTs, osteoporosis  RESTRICTIONS/PRECAUTIONS:    Restrictions/Precautions  Restrictions/Precautions: Weight Bearing, Fall Risk  Required Braces or Orthoses?: Yes  Implants present? : Pacemaker  Position Activity Restriction  Spinal Precautions: No Bending, No Lifting, No Twisting  OVERALL  ORIENTATION STATUS:     PAIN:  Pain Level: 7  Pain Type: Surgical pain    Pain Location: Hip     Pain Orientation: Right, Left      NEUROLOGICAL                          STRENGTH  Strength RLE  Comment: grossly 2-/5  Strength LLE  Comment: grossly 2-/5  ROM  AROM RLE (degrees)  RLE General AROM: pt has difficulty moving BLE on command  AROM LLE (degrees)  LLE General AROM: pt has diifficulty actively moving BLE on command   POSTURE/BALANCE  Balance  Posture: Fair  Sitting - Static: Fair (holds bedrails with BUE SBA)  Sitting - Dynamic: Poor       ACTIVITY TOLERANCE  Activity Tolerance  Activity Tolerance: Patient limited by fatigue, Patient Tolerated treatment well      BED MOBILITY  Bed Mobility  Rolling: Minimal assistance  Supine to Sit: Maximal assistance  Sit to Supine: Maximal assistance  Scooting: Maximal assistance  Comment: pt requires assist to bend knee to assit with roll and able to reach and grab bedrail to pull self to roll and hold while performing pericare   TRANSFERS  Sit to Stand: Dependent/Total (with steady)      Bed to Chair: Dependent/Total        WHEELCHAIR PROPULSION 1     WHEELCHAIR PROPULSION 2     AMBULATION 1 AMBULATION 2     STAIRS       GOALS:  Short term goals  Time Frame for Short term goals: 1-2 weeks  Short term goal 1: supine<>sit ModA  Short term goal 2: functional transfers w/FWW ModA  Short term goal 3: ambulate w/FWW w/ModA 25 ft   Short term goal 4: WC mobility Nem[place] 25 ft    Long term goals  Time Frame for Long term goals : 3-4 weeks  Long term goal 1: bed mobiity Nemesio  Long term goal 2: functional transfers Nem[place]io w/FWW  Long term goal 3: ambulate w/FWW Min A 50 ft   Long term goal 4: WC mobilit SBA 50 ft   HOME LIVING:     Type of Home: House  Home Layout: One level  Home Access: Level entry        ASSESSMENT (IMPAIRMENTS/BARRIERS): Body structures, Functions, Activity limitations: Decreased functional mobility , Decreased ROM, Decreased strength, Decreased endurance, Decreased balance, Decreased coordination, Increased pain, Decreased posture  Assessment: much improved participation and tolerance of sitting in recliner from this am. pt recieved new air mattress, making it difficult to use sliding board and pt really fatigued. pt brings iimproved effort with suine BLE exercises with improved AAROM and able to demonstrate some improved strength. will continue to progress as tolerated and per POC.    Activity Tolerance: Patient limited by fatigue, Patient Tolerated treatment well  Specific instructions for Next Treatment: sitting EOB tolerance, static balance   PLAN:  Plan  Times per week: 5-6x  Times per day:  (1-2x)  Plan weeks: 3-4 weeks  Specific instructions for Next Treatment: sitting EOB tolerance, static balance   Current Treatment Recommendations: Strengthening, ROM, Balance Training, Functional Mobility Training, Transfer Training, Gait Training, Stair training, Wheelchair Mobility Training, Endurance Training, Pain Management, Patient/Caregiver Education & Training, Safety Education & Training  Discharge Recommendations: Home with Home health PT, 24 hour supervision or assist  PATIENT REQUIRES AND IS REASONABLY EXPECTED TO ACTIVELY PARTICIPATE IN AT LEAST 3 HOURS OF INTENSIVE THERAPY PER DAY AT LEAST 5 DAYS PER WEEK, AND BE EXPECTED TO MAKE MEASURABLE IMPROVEMENT THAT WILL BE OF PRACTICAL VALUE TO IMPROVE THE PATIENT'S FUNCTIONAL CAPACITY OR ADAPTATION TO IMPAIRMENTS.    PATIENT GOAL FOR REHAB:  RETURN TO PRIOR LEVEL OF FUNCTION       IRF/CAT  Roll Left and Right  Assistance Needed: Substantial/maximal assistance  CARE Score: 2  Discharge Goal: Partial/moderate assistance    Sit to Lying  Assistance Needed: Substantial/maximal assistance  CARE Score: 2  Discharge Goal: Partial/moderate assistance    Lying to Sitting on Side of Bed  Assistance Needed: Substantial/maximal assistance  CARE Score: 2  Discharge Goal: Partial/moderate assistance    Sit to Stand  Assistance Needed: Dependent  Reason if not Attempted: Not attempted due to medical condition or safety concerns  CARE Score: 1  Discharge Goal: Partial/moderate assistance    Chair/Bed-to-Chair Transfer  Assistance Needed: Dependent  Reason if not Attempted: Not attempted due to medical condition or safety concerns  CARE Score: 1  Discharge Goal: Partial/moderate assistance    Car Transfer  Reason if not Attempted: Not attempted due to medical condition or safety concerns  CARE Score: 88  Discharge Goal: Partial/moderate assistance    Walk 10 Feet  Reason if not Attempted: Not attempted due to medical condition or safety concerns  CARE Score: 88  Discharge Goal: Partial/moderate assistance    Walk 50 Feet with Two Turns  Reason if not Attempted: Not attempted due to medical condition or safety concerns  CARE Score: 88  Discharge Goal: Partial/moderate assistance    Walk 150 Feet  Reason if not Attempted: Not attempted due to medical condition or safety concerns  CARE Score: 88  Discharge Goal: Not Attempted    Walking 10 Feet on Uneven Surfaces  Reason if not Attempted: Not attempted due to medical condition or safety concerns  CARE Score: 88  Discharge Goal: Not Attempted    1 Step (Curb)  Reason if not Attempted: Not attempted due to medical condition or safety concerns  CARE Score: 88  Discharge Goal: Not Attempted    4 Steps  Reason if not Attempted: Not attempted due to medical condition or safety concerns  CARE Score: 88  Discharge Goal: Not Attempted    12 Steps  Reason if not Attempted: Not attempted due to medical condition or safety concerns  CARE Score: 88  Discharge Goal: Not Attempted    Wheel 50 Feet with Two Turns  Reason if not Attempted: Not attempted due to medical condition or safety concerns  CARE Score: 88  Discharge Goal: Supervision or touching assistance    Wheel 150 Feet  Reason if not Attempted: Not attempted due to medical condition or safety concerns  CARE Score: 88  Discharge Goal: Supervision or touching assistance      LAST TREATMENT TIME  PT Individual Minutes  Time In: 1000  Time Out: 1100  Minutes: 60

## 2021-11-04 NOTE — PLAN OF CARE
Goal Outcome Evaluation:  Plan of Care Reviewed With: patient        Progress: no change  Outcome Summary: A&Ox4. C/o pain. Prn pain medication given with good relief. Lidocaine patch in place to lower spine. Spine incisions edematous. Soft internal knot noted on the right incision. No drainage. Reports numbness and tingling to BLE. BLE extremely weak. PPP. SCD. . Room air. F/c maintained. RUE midline drsg CDI. Call light within reach. Safety maintained.

## 2021-11-04 NOTE — PLAN OF CARE
Goal Outcome Evaluation:  Plan of Care Reviewed With: patient, family        Progress: no change  Outcome Summary: PT eval complete. Pt found in fowlers position, drowsy from recent admin of pain medication, w c/o of 8/10 back pain. Unable to obtain complete history and prior level of function due to pt's decreased alertness from medication. Educated pt on spinal precautions with verbal understanding demonstrated from patient. Pt reported intact symmetrical BLE sensation when assessed at beginning of eval but was unable to correctly identify location of touch when sensation was assessed with eyes closed. LLE PROM hip flexion and knee ext WFL, decreased PROM ankle DF. RLE PROM hip flexion WFL, decreased PROM knee ext and ankle DF. Pt unable to actively move BLE against gravity when sitting EOB, general BLE MMT hip flex 1/5, hip abd 2-/5, hip add 2-/5, knee ext 1/5, ankle DF 1/5. Impaired proprioception for B ankles and great toes. Rolling R/L in bed ModA, scooting pt up in bed dependent x 2 person assist, sidelying/sit ModA-MaxA x 2, sit/sidelying MaxA x 2, and sit/stand MaxA x 2. Attempted to stand pt from EOB, however pt unable to actively contract BLE muscles in order to perform transfer; pt's bottom remained on EOB. Static/dynamic sitting balance moderately impaired req assistance for pt to remain upright. Pt left in bed with call light in reach. PT indicated to address functional limitations and deficits hindering pt's overall quality of life. Recommended d/c to SNF vs IRF pending pt progress.

## 2021-11-04 NOTE — H&P
NEUROSURGERY INITIAL HOSPITAL ENCOUNTER    Assessment/Plan:   Tawny Shin is a 66 y.o. female with a significant medical history of prior L3-5 posterior lumbar fusion (Dr. Oropeza 2018), MSSA osteomyelitis of right femur (7/6/2021), C6 corpectomy (3/3/2021), L1-2 osteomyelitis/discitis requiring L1 microdiscectomy (3/23/2021), and T12 kyphoplasty (10/26/2021), rheumatoid arthritis on disease modifying agents, osteoporosis, Sjogren's disease, MI, CAD with stenting, DVT right lower extremity currently on Xarelto and ASA (as of 11/4/2021), hypertension, COPD, hyperlipidemia, hypothyroidism, and she is overweight.  She presents from rehab with L1-3 numbness and pain and worsening proximal leg weakness over last 4-5 days.  Physical exam findings of bright and awake, oriented x3, chronic bilateral  strength weakness, worsening left and right IP (1/5) and quadricep weakness (3/5), BLE (4-/5), gross hyperreflexia and bilateral Babinski's.  Her imaging shows anterior epidural hematoma associated with her T12 compression fracture.      Differential Diagnosis:   Anterior epidural hematoma  T12 compression fracture status post kyphoplasty 10/26/2021  Known history of osteomyelitis L1/2  Prior instrumented fusion L3-5    Medical Decision Making:  Tawny presents with a subacute onset of bilateral lower extremity weakness for the last 4 to 5 days.  Up until last night she was still on Xarelto and aspirin.  Imaging shows an acute to subacute anterior epidural hematoma with cord compression.  Fortunately she appears to have preservation of bilateral dorsiflexion plantarflexion.    Differential diagnosis includes hematoma secondary to T12 fracture versus hematoma associated with kyphoplasty on 10/26 versus expansion of infection from L1/2.  At this time I would favor the former.  She recovered well from her kyphoplasty and then was restarted on her anticoagulation.  Anterior epidural hematoma is associated with compression  fractures and osteoporotic spines on anticoagulation is not unheard of but is a rare occurrence.  Regardless at this time she is at least 72 hours past the onset of weakness, she has preservation of the lower motor groups, and she is on appropriately indicated nonreversible anticoagulant and nonreversible platelet inhibitor.      Based on these factors I would favor conservative management.  She needs at least 5 days to be fully coagulated.  In this time we will monitor her closely for neurological improvement.    Situation is additionally complicated by rigid construct from L3-L5 with a compromised anterior column with prior osteomyelitis at L1-2, posterior hemilaminectomy at L1/2, and T12 compression fracture.  Any treatment of the anterior epidural hematoma would require a T11-L2 hemilaminectomy at a minimum.  This would result in further destabilization of the spine.  Given that she is more than 72 hours past neurological decline I think the benefit from decompression would be minimal and would include greater than normal risk favoring conservative management at this time.    Recommendations:  -Admit to the floor  -Hold Xarelto and aspirin  -Every 4 hours neurochecks  -Physical therapy and Occupational Therapy as tolerated  -Horan for urinary retention identified on MRI      I discussed the patients findings and my recommendations with patient, family and consulting provider    Thank you very much for this interesting consult.     Level of Risk: High due to:  severe exacerbation of chronic illness, illness with threat to life or bodily function and abrupt change in neurological status  MDM: High  Mod = 70395, Tbno=24844  ___________________________________________________________________    Reason for consult: Acute onset of proximal leg weakness  Consult Requested by: Dr. Reynaldo Austin inpatient rehab    Chief Complaint: Proximal leg pain and leg weakness    HPI: Tawny Shin is a 66 y.o. female with a  significant medical history of prior L3-5 posterior lumbar fusion (Dr. Oropeza 2018), MSSA osteomyelitis of right femur (7/6/2021), C6 corpectomy (3/3/2021), L1-2 osteomyelitis/discitis requiring L1 microdiscectomy (3/23/2021), and T12 kyphoplasty (10/26/2021), rheumatoid arthritis on disease modifying agents, osteoporosis, Sjogren's disease, MI, CAD with stenting, DVT right lower extremity currently on Xarelto and ASA (as of 11/4/2021), hypertension, COPD, hyperlipidemia, hypothyroidism, and she is overweight.      10/26/2021 she underwent an uneventful kyphoplasty.  She did well after the surgery with improved back pain.  She worked with physical therapy and was cleared for discharge.    10/30/2021 the patient was at [facility] rehab when she began to have lower extremity weakness and worsening anterior thigh pain and numbness.  Is unclear when this began and if it was progressive or an acute onset.  Patient was still able to rise with assistive devices.  She is unclear as to the last and that she stood independently but she believes that this was prior to discharge.  She denies any bowel or bladder incontinence.  Currently she complains of low back pain.  Nonmechanical nature.  Bilateral anterior thigh pain.  She still able to wiggle her ankles and toes but has significantly decreased proximal thighs.    Review of Systems   HENT: Negative.    Eyes: Negative.    Respiratory: Negative.    Cardiovascular: Negative.    Gastrointestinal: Negative.    Endocrine: Negative.    Genitourinary: Negative.    Musculoskeletal: Positive for back pain and gait problem.   Skin: Negative.    Allergic/Immunologic: Negative.    Neurological: Positive for weakness and numbness.   Hematological: Negative.    Psychiatric/Behavioral: Negative.         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), Elevated cholesterol, Eustachian tube dysfunction, Heart disease, Herpes simplex, History of transfusion, Hyperlipidemia, Hypertension, Hypothyroidism (12/23/2020), Intrinsic asthma (12/23/2020), Knee dislocation, Labral tear of right hip joint, Laryngitis sicca, Laryngitis, chronic, Left carotid bruit (3/9/2016), MI (myocardial infarction) ([facility]), Myalgia due to statin (6/25/2019), Open wound of right hip (9/14/2021), Osteomyelitis of right femur ([facility]) (7/6/2021), Otorrhea, Pacemaker (11/17/2016), Primary osteoarthritis of left knee (12/23/2020), Psoriasis vulgaris (12/23/2020), S/P coronary artery stent placement (3/9/2016), Sensorineural hearing loss, Seropositive rheumatoid arthritis of multiple sites ([facility]) (12/27/2019), Sick sinus syndrome ([facility]) (12/27/2019), Sjogren's disease ([facility]), Spondylolisthesis of lumbar region (1/17/2018), and Syncope, recurrent (2/8/2021).    Past Surgical History:  has a past surgical history that includes Myringotomy w/ tubes (09/04/2014); Atrial cardiac pacemaker insertion; A-V cardiac pacemaker insertion (2016); lumbar laminectomy with fusion (Left, 1/17/2018); Lumbar fusion (N/A, 1/19/2018); Replacement total knee (Right); Other surgical history; Cardiac catheterization; Coronary angioplasty with stent; Colonoscopy (11/08/2011); Colonoscopy (11/12/2004); Esophagogastroduodenoscopy (07/10/2014); Cataract extraction; Joint replacement; hip abduction tenotomy bilateral (Right, 1/14/2021); Incision and drainage hip (Right, 2/9/2021); Cervical Curettage (N/A, 3/3/2021); Lumbar discectomy (Right, 3/23/2021); Incision and drainage of wound (Right,  7/8/2021); Leg Debridement (Right, 9/14/2021); Flap Leg (Right, 9/14/2021); incision and drainage leg (Right, 10/24/2021); and kyphoplasty with biopsy (Bilateral, 10/26/2021)..      Family History: family history includes Cancer in her mother; Heart disease in her father.     Social History:  reports that she has never smoked. She has never used smokeless tobacco. She reports that she does not drink alcohol and does not use drugs.    Allergies: Atorvastatin, Amoxicillin, Escitalopram, Nabumetone, Niacin er, Penicillins, and Simvastatin    Home Medications:   Current Facility-Administered Medications:   •  HYDROcodone-acetaminophen (NORCO) 7.5-325 MG per tablet 1 tablet, 1 tablet, Oral, Q4H PRN, Bandar Shea MD, 1 tablet at 11/04/21 0156    Inpatient Medications: Scheduled Meds:   Continuous Infusions:   PRN Meds:.•  HYDROcodone-acetaminophen  Home Medications:   Prior to Admission medications    Medication Sig Start Date End Date Taking? Authorizing Provider   acetaminophen (TYLENOL) 325 MG tablet Take 2 tablets by mouth Every 4 (Four) Hours As Needed for Mild Pain . 7/13/21   Christos Min,    aspirin 81 MG EC tablet Take 1 tablet by mouth Daily. 9/16/21   Nayan Hale DO   carvedilol (COREG) 25 MG tablet Take 1 tablet by mouth 2 (Two) Times a Day With Meals. 7/1/21   Davon Urrutia MD   ceFAZolin in 0.9% normal saline (ANCEF) 2 GM/ 100 mL solution IVPB Infuse 100 mL into a venous catheter Every 8 (Eight) Hours for 19 days. Indications: Bacteria in the Blood 10/23/21 11/11/21  Nayan Hale DO   docusate sodium 100 MG capsule Take 1 capsule by mouth 2 (Two) Times a Day. 7/13/21   Christos Min,    ferrous sulfate 325 (65 Fe) MG tablet Take 325 mg by mouth Daily With Breakfast.    Leila Daly MD   fluticasone (FLONASE) 50 MCG/ACT nasal spray 1 spray into the nostril(s) as directed by provider Daily As Needed. 5/1/21   Leila Daly MD   folic acid (FOLVITE) 1  MG tablet Take 1 mg by mouth Daily.    Leila Daly MD   furosemide (LASIX) 40 MG tablet Take 40 mg by mouth Daily.    Leila Daly MD   HYDROcodone-acetaminophen (NORCO) 7.5-325 MG per tablet Take 1 tablet by mouth Every 4 (Four) Hours As Needed for Moderate Pain . 10/28/21   Nayan Hale DO   isosorbide mononitrate (IMDUR) 60 MG 24 hr tablet Take 1 tablet by mouth 2 (Two) Times a Day. 3/26/21   Taiwo Prado APRN   levothyroxine (SYNTHROID, LEVOTHROID) 75 MCG tablet Take 1 tablet by mouth Daily. 6/22/21   Davon Urrutia MD   lidocaine (LIDODERM) 5 % Place 1 patch on the skin as directed by provider Daily As Needed. Remove & Discard patch within 12 hours or as directed by MD     Leila Daly MD   magnesium oxide (MAG-OX) 400 MG tablet Take 400 mg by mouth Daily. 4/12/21   Leila Daly MD   multivitamin with minerals tablet tablet Take 1 tablet by mouth Daily. 3/6/21   Taiwo Prado APRN   Naloxegol Oxalate (Movantik) 25 MG tablet Take 25 mg by mouth Every Morning.    Leila Daly MD   nitroglycerin (Nitrostat) 0.4 MG SL tablet Place 1 tablet under the tongue Every 5 (Five) Minutes As Needed for Chest Pain. Take no more than 3 doses in 15 minutes. 3/26/21   Taiwo Prado APRN   pantoprazole (PROTONIX) 40 MG EC tablet Take 1 tablet by mouth Daily. 6/22/21   Davon Urrutia MD   polyethylene glycol (polyethylene glycol) 17 g packet Take 17 g by mouth Daily. 10/29/21   Nayan Hale DO   potassium chloride (KLOR-CON) 20 MEQ packet Take 20 mEq by mouth 2 (Two) Times a Day.    Leila Daly MD   rivaroxaban (Xarelto) 20 MG tablet Take 1 tablet by mouth Daily. 10/12/21   Davon Urrutia MD   saccharomyces boulardii (Florastor) 250 MG capsule Take 1 capsule by mouth Daily. 7/1/21   Davon Urrutia MD   salsalate (DISALCID) 500 MG tablet Take 500 mg by mouth Daily. Take 5 tablets by mouth Monday, Wednesdays and Fridays, and 4 tablets on  Tuesdays, Thursday and Sundays    Leila Daly MD   sucralfate (CARAFATE) 1 g tablet Take 1 g by mouth 4 (Four) Times a Day Before Meals & at Bedtime.    Leila Daly MD   thiamine1 (VITAMIN B1) 250 MG tablet Take 250 mg by mouth Daily.    Leila Daly MD   traMADol (ULTRAM) 50 MG tablet Take 50 mg by mouth 3 (Three) Times a Day.    Leila Daly MD   valACYclovir (VALTREX) 500 MG tablet Take 1 tablet by mouth Daily. Indications: chronic suppressive therapy 6/22/21   Davon Urrutia MD        Vital Signs  Temp:  [97.3 °F (36.3 °C)-97.6 °F (36.4 °C)] 97.4 °F (36.3 °C)  Heart Rate:  [63-69] 69  Resp:  [16-18] 18  BP: (131-174)/(50-66) 147/59    Physical Exam  Physical Exam  Constitutional:       Appearance: She is well-developed.   HENT:      Head: Normocephalic and atraumatic.   Eyes:      Extraocular Movements: EOM normal.      Pupils: Pupils are equal, round, and reactive to light.   Pulmonary:      Effort: Pulmonary effort is normal.      Breath sounds: Normal breath sounds.   Abdominal:      Palpations: Abdomen is soft.   Musculoskeletal:         General: Normal range of motion.      Cervical back: Normal range of motion and neck supple.   Skin:     General: Skin is warm and dry.   Neurological:      Mental Status: She is oriented to person, place, and time.      Deep Tendon Reflexes:      Reflex Scores:       Tricep reflexes are 3+ on the right side and 3+ on the left side.       Bicep reflexes are 3+ on the right side and 3+ on the left side.       Brachioradialis reflexes are 3+ on the right side and 3+ on the left side.       Patellar reflexes are 3+ on the right side and 3+ on the left side.       Achilles reflexes are 3+ on the right side and 3+ on the left side.  Psychiatric:         Speech: Speech normal.         Neurologic Exam     Mental Status   Oriented to person, place, and time.   Speech: speech is normal   Level of consciousness: alert    Cranial Nerves     CN  III, IV, VI   Pupils are equal, round, and reactive to light.  Extraocular motions are normal.     CN V   Facial sensation intact.     CN VII   Facial expression full, symmetric.     Motor Exam   Right arm pronator drift: absent  Left arm pronator drift: absent    Strength   Right deltoid: 5/5  Left deltoid: 5/5  Right biceps: 5/5  Left biceps: 5/5  Right triceps: 4/5  Left triceps: 4/5  Right interossei: 4/5  Left interossei: 4/5  Right iliopsoas: 0/5  Left iliopsoas: 1/5  Right quadriceps: 1/5  Left quadriceps: 3/5  Right anterior tibial: 4/5  Left anterior tibial: 4/5  Right gastroc: 4/5  Left gastroc: 4/5    Sensory Exam   Light touch normal.   Sensory deficit distribution on right: L1  Sensory deficit distribution on left: L1    Gait, Coordination, and Reflexes     Gait  Gait: (unable to stand with a walker. )    Reflexes   Right brachioradialis: 3+  Left brachioradialis: 3+  Right biceps: 3+  Left biceps: 3+  Right triceps: 3+  Left triceps: 3+  Right patellar: 3+  Left patellar: 3+  Right achilles: 3+  Left achilles: 3+  Right plantar: upgoing  Left plantar: upgoing  Right Mims: absent  Left Mims: absent      Results Review:   Independent review and interpretation of imaging  Imaging Results (Last 24 Hours)     ** No results found for the last 24 hours. **        MRI brain:  MRI spine: Outside noncontrast MRI of the lumbar spine is reviewed.  Evidence of prior kyphoplasty at T12.  Anterior epidural hematoma from T11-L1 2 disc base.  Prior evidence of osteomyelitis at L1/2.  Prior instrumented fusion from L3-L5 with construct appears stable and no evidence of cord compression at this level.      CT Head:  CT c-spine:  CT t-spine:  CT l-spine:  X-ray:    I reviewed the patient's new clinical results.  Lab Results (last 24 hours)     ** No results found for the last 24 hours. **          Bandar Shea MD

## 2021-11-04 NOTE — PLAN OF CARE
Goal Outcome Evaluation:  Plan of Care Reviewed With: patient, sibling        Progress: no change  Outcome Summary: OT eval completed. Pt is drowsy from recent pain medication per sister, agreeable to therapy eval. C/o pain in back and hips but no increased exacerbation of pain with mobility. Rolls L/R with modA and vc/tc and use of bed rail. Comes to sitting EOB with modAx2. Pt demos decreased trunk control in sitting requiring varying levels of assist to maintain midline. Pt has difficulty utilizing BUE for trunk support at EOB. Attempted to stand x1, unable to get bottom up from EOB and demos little activation of BLE to attempt stand, deferred and assisted at EOB. Pt demos good formal strength testing of BUE despite limited use during functional tasks. OT indicated to address ADL, bed mobility, and transfer to increase independence and safety. Recommend d/c rehab.

## 2021-11-04 NOTE — THERAPY EVALUATION
Patient Name: Tawny Shin  : 1955    MRN: [id number]                              Today's Date: 2021       Admit Date: 2021    Visit Dx:     ICD-10-CM ICD-9-CM   1. Impaired mobility  Z74.09 799.89     Patient Active Problem List   Diagnosis   • T12 compression fracture, initial encounter ([facility]ly [facility])   • Chronic embolism and thrombosis of unspecified deep veins of left lower extremity ([facility])   • Urinary tract infection without hematuria   • Acute bilateral thoracic back pain   • Chronic anticoagulation   • Hypokalemia   • Transaminitis   • Iron deficiency anemia   • Osteoporosis   • Bacteremia   • Moderate malnutrition (CMS/[facility]ly [facility])   • Epidural hematoma ([facility])     Past Medical History:   Diagnosis Date   • Age-related osteoporosis with current pathological fracture 2020   • Arthritis    • Asthma    • Bilateral bunions 2020   • Cancer ([facility])    • Cardiac pacemaker syndrome 2020    Overview:  - heart block - implanted    • Charcot's joint of foot, left 2020   • Chronic deep vein thrombosis (DVT) of right lower extremity ([facility]) 2021   • Chronic pain syndrome 2021   • Chronic sinusitis    • COPD (chronic obstructive pulmonary disease) ([facility])    • Coronary artery disease    • 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   • Elevated cholesterol    • Eustachian tube dysfunction    • Heart disease    • Herpes simplex    • History of transfusion    • Hyperlipidemia    • Hypertension    • Hypothyroidism 2020   • Intrinsic asthma 2020   • Knee dislocation    • Labral tear of right hip joint    • Laryngitis sicca    • Laryngitis, chronic    • Left carotid bruit 3/9/2016   • MI (myocardial infarction) ([facility])    • Myalgia due to statin 2019   • Open wound of right hip 2021   • Osteomyelitis of right femur ([facility]) 2021   • Otorrhea    • Pacemaker 2016   • Primary osteoarthritis of left knee 2020   • Psoriasis vulgaris 2020   • S/P coronary  "artery stent placement 3/9/2016   • Sensorineural hearing loss    • Seropositive rheumatoid arthritis of multiple sites ([facility]) 12/27/2019    Overview:  -myochrysine '93-'96 -methotrexate '96--->11/98;r/s  restarted 2/99--> 8/14 (anemia) -sulfasalazine- not effective -penicillamine 6/98-->10/98; no effect -leflunomide 11/98--> - Humira '13-->didn't take - Enbrel 12/14-->3/15- no effect!   Last Assessment & Plan:  - \"aching all over\" because she had to be off her anti-rheumatic drugs for 2 weeks in preparation for her R knee surgery - he   • Sick sinus syndrome ([facility]) 12/27/2019   • Sjogren's disease ([facility])    • Spondylolisthesis of lumbar region 1/17/2018   • Syncope, recurrent 2/8/2021     Past Surgical History:   Procedure Laterality Date   • A-V CARDIAC PACEMAKER INSERTION  2016   • ATRIAL CARDIAC PACEMAKER INSERTION     • CARDIAC CATHETERIZATION     • CATARACT EXTRACTION     • CERVICAL CORPECTOMY N/A 3/3/2021    Procedure: CERVICAL 6 CORPECTOMY WITH TITANIUM CAGE WITH NEURO MONITORING;  Surgeon: Bandar Shea MD;  Location: [facility] OR;  Service: Neurosurgery;  Laterality: N/A;   • COLONOSCOPY  11/08/2011    One fold in the ascending colon which showed ulcer otherwise normal exam   • COLONOSCOPY  11/12/2004    Normal exam repeat in five years   • CORONARY ANGIOPLASTY WITH STENT PLACEMENT      X 2; 2013 & 2014   • ENDOSCOPY  07/10/2014    Normal exam   • FLAP LEG Right 9/14/2021    Procedure: RIGHT GLUTEAL FASCIOCUTANEOUS ADVANCEMENT FLAP AND RIGHT TENSOR FASCIAL JESSICA FLAP;  Surgeon: Amadeo Turner MD;  Location:  [facility] OR;  Service: Plastics;  Laterality: Right;   • HIP ABDUCTION TENOTOMY BILATERAL Right 1/14/2021    Procedure: RIGHT HIP GLUTEUS MEDLUS / MINIMUS REPAIR, POSSIBLE ACHILLES ALLOGRAFT;  Surgeon: Nino Carlson MD;  Location:  [facility] OR;  Service: Orthopedics;  Laterality: Right;   • INCISION AND DRAINAGE HIP Right 2/9/2021    Procedure: HIP INCISION AND DRAINAGE;  Surgeon: Nino Carlson MD;  " Location:  [facility] OR;  Service: Orthopedics;  Laterality: Right;   • INCISION AND DRAINAGE LEG Right 10/24/2021    Procedure: INCISION AND DRAINAGE LOWER EXTREMITY;  Surgeon: Amadeo Turner MD;  Location:  [facility] OR;  Service: Plastics;  Laterality: Right;   • INCISION AND DRAINAGE OF WOUND Right 7/8/2021    Procedure: INCISION AND DRAINAGE WOUND RIGHT HIP;  Surgeon: James Huntley MD;  Location:  [facility] OR;  Service: Orthopedics;  Laterality: Right;   • JOINT REPLACEMENT     • KYPHOPLASTY WITH BIOPSY Bilateral 10/26/2021    Procedure: THOARCIC 12 KYPHOPLASTY WITH BIOPSY;  Surgeon: Bandar Shea MD;  Location:  [facility] OR;  Service: Neurosurgery;  Laterality: Bilateral;   • LEG DEBRIDEMENT Right 9/14/2021    Procedure: DEBRIDEMENT OF RIGHT HIP WOUND, RIGHT GLUTEAL FASCIOCUTANEOUS ADVANCEMENT FLAP AND RIGHT TENSOR FASCIAL JESSICA FLAP;  Surgeon: Amadeo Turner MD;  Location:  [facility] OR;  Service: Plastics;  Laterality: Right;   • LUMBAR DISCECTOMY Right 3/23/2021    Procedure: LUMBAR DISCECTOMY MICRO, Lumbar 1/2 right;  Surgeon: Bandar Shea MD;  Location:  [facility] OR;  Service: Neurosurgery;  Laterality: Right;   • LUMBAR FUSION N/A 1/19/2018    Procedure: L3-4,L4-5 DECOMPRESSION, POSTERIOR SPINAL FUSION WITH INSTRUMENTATION;  Surgeon: Fortino Oropeza MD;  Location:  [facility] OR;  Service:    • LUMBAR LAMINECTOMY WITH FUSION Left 1/17/2018    Procedure: LEFT L3-4 L4-5 LATERAL LUMBAR INTERBODY FUSION;  Surgeon: Fortino Oropeza MD;  Location:  [facility] OR;  Service:    • MYRINGOTOMY W/ TUBES  09/04/2014    TUBES NO LONGER IN PLACE   • OTHER SURGICAL HISTORY      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 so hardware was removed and spacers were placed   • REPLACEMENT TOTAL KNEE Right       General Information     Row Name 11/04/21 8146          Physical Therapy Time and Intention    Document Type evaluation  -SB (r) BH (t) SB (c)     Mode of Treatment physical therapy  -SB (r) BH (t) SB (c)     Row Name  11/04/21 1408          General Information    Patient Profile Reviewed yes  -SB (r) BH (t) SB (c)     Prior Level of Function --  unable to obtain PLOF due to decreased alertness  -SB (r) BH (t) SB (c)     Existing Precautions/Restrictions fall; spinal  -SB (r) BH (t) SB (c)     Barriers to Rehab medically complex; physical barrier; previous functional deficit  -SB (r) BH (t) SB (c)     Row Name 11/04/21 1408          Living Environment    Lives With facility resident  pt receiving rehab from [facility]  -SB (r) BH (t) SB (c)     Row Name 11/04/21 1408          Safety Issues, Functional Mobility    Safety Issues Affecting Function (Mobility) friction/shear risk  -SB (r) BH (t) SB (c)     Impairments Affecting Function (Mobility) balance; endurance/activity tolerance; pain; range of motion (ROM); sensation/sensory awareness; strength  -SB (r) BH (t) SB (c)           User Key  (r) = Recorded By, (t) = Taken By, (c) = Cosigned By    Initials Name Provider Type    Alyssa Roberts, PT DPT Physical Therapist    Juliana Aguiar, PT Student PT Student               Mobility     Row Name 11/04/21 1408          Bed Mobility    Bed Mobility rolling left; rolling right; scooting/bridging; sidelying-sit; sit-sidelying  -SB (r) BH (t) SB (c)     Rolling Left [place] (Bed Mobility) moderate assist (50% patient effort); verbal cues; nonverbal cues (demo/gesture)  -SB (r) BH (t) SB (c)     Rolling Right [place] (Bed Mobility) moderate assist (50% patient effort); verbal cues; nonverbal cues (demo/gesture)  -SB (r) BH (t) SB (c)     Scooting/Bridging [place] (Bed Mobility) dependent (less than 25% patient effort); 2 person assist  -SB (r) BH (t) SB (c)     Sidelying-Sit [place] (Bed Mobility) moderate assist (50% patient effort); maximum assist (25% patient effort); 2 person assist; verbal cues  -SB (r) BH (t) SB (c)     Sit-Sidelying [place] (Bed Mobility) maximum assist (25% patient effort); 2 person  assist; verbal cues  -SB (r) BH (t) SB (c)     Assistive Device (Bed Mobility) bed rails; draw sheet  -SB (r) BH (t) SB (c)     Comment (Bed Mobility) Pt required increased time and assistance to perform bed mobility transfers due to generalized weakness.  -SB (r) BH (t) SB (c)     Row Name 11/04/21 1408          Transfers    Comment (Transfers) Attempted to stand from EOB 1 time; pt unable to initiate LE muscle activation in order to lift off EOB, returned to sitting position.  -SB (r) BH (t) SB (c)     Row Name 11/04/21 1408          Sit-Stand Transfer    Sit-Stand [place] (Transfers) maximum assist (25% patient effort); 2 person assist  -SB (r) BH (t) SB (c)           User Key  (r) = Recorded By, (t) = Taken By, (c) = Cosigned By    Initials Name Provider Type    Alyssa Roberts, PT DPT Physical Therapist    Juliana Aguiar, PT Student PT Student               Obj/Interventions     Row Name 11/04/21 1408          Range of Motion Comprehensive    General Range of Motion lower extremity range of motion deficits identified  -SB (r) BH (t) SB (c)     Comment, General Range of Motion LLE PROM hip flex and knee ext WFL, decreased ankle DF; RLE PROM hip flex WFL, decreased knee ext and DF  -SB (r) BH (t) SB (c)     Row Name 11/04/21 1408          Strength Comprehensive (MMT)    General Manual Muscle Testing (MMT) Assessment lower extremity strength deficits identified  -SB (r) BH (t) SB (c)     Comment, General Manual Muscle Testing (MMT) Assessment General BLE MMT: hip flex 1/5, hip abd 2-/5, hip add 2-/5, knee ext 1/5, ankle DF 1/5  -SB (r) BH (t) SB (c)     Row Name 11/04/21 1408          Motor Skills    Motor Skills posture  -SB (r) BH (t) SB (c)     Row Name 11/04/21 1408          Balance    Balance Assessment sitting static balance; sitting dynamic balance  -SB (r) BH (t) SB (c)     Static Sitting Balance moderate impairment; supported; sitting, edge of bed  -SB (r) BH (t) SB (c)     Dynamic Sitting  Balance moderate impairment; supported; sitting, edge of bed  -SB (r) BH (t) SB (c)     Comment, Balance Pt required assistance to maintain sitting balance at EOB.  -SB (r) BH (t) SB (c)     Row Name 11/04/21 1408          Sensory Assessment (Somatosensory)    Sensory Assessment (Somatosensory) unable/difficult to assess  pt reported symmetrical intact sensation at beginning of eval but was unable to identify location of touch when sensation was tested with eyes closed  -SB (r) BH (t) SB (c)     Row Name 11/04/21 1408          Posture    Posture postural deviations  -SB (r) BH (t) SB (c)     Row Name 11/04/21 1408          Postural Deviations    Comment, Postural Deviations Pt presented with slumped fwd head/shoulders when sitting EOB, requiring verbal cues to bring gaze fwd and sit upright.  -SB (r) BH (t) SB (c)           User Key  (r) = Recorded By, (t) = Taken By, (c) = Cosigned By    Initials Name Provider Type    Alyssa Roberts, PT DPT Physical Therapist    Juliana Aguiar, PT Student PT Student               Goals/Plan     Row Name 11/04/21 1408          Bed Mobility Goal 1 (PT)    Activity/Assistive Device (Bed Mobility Goal 1, PT) rolling to left; rolling to right; scooting; sidelying to sit; sit to sidelying  -SB (r) BH (t) SB (c)     [place] Level/Cues Needed (Bed Mobility Goal 1, PT) minimum assist (75% or more patient effort); 2 person assist; verbal cues required  -SB (r) BH (t) SB (c)     Time Frame (Bed Mobility Goal 1, PT) long term goal (LTG); 10 days  -SB (r) BH (t) SB (c)     Progress/Outcomes (Bed Mobility Goal 1, PT) goal ongoing  -SB (r) BH (t) SB (c)     Row Name 11/04/21 1408          Transfer Goal 1 (PT)    Activity/Assistive Device (Transfer Goal 1, PT) sit-to-stand/stand-to-sit; bed-to-chair/chair-to-bed  -SB (r) BH (t) SB (c)     [place] Level/Cues Needed (Transfer Goal 1, PT) moderate assist (50-74% patient effort); 2 person assist; verbal cues required  -SB (r) BH (t)  SB (c)     Time Frame (Transfer Goal 1, PT) long term goal (LTG); 10 days  -SB (r) BH (t) SB (c)     Progress/Outcome (Transfer Goal 1, PT) goal ongoing  -SB (r) BH (t) SB (c)     Row Name 11/04/21 1408          Patient Education Goal (PT)    Activity (Patient Education Goal, PT) Demonstrate understanding of spinal precautions during bed mobility transfers.  -SB (r) BH (t) SB (c)     [place]/Cues/Accuracy (Memory Goal 2, PT) demonstrates adequately; independent  -SB (r) BH (t) SB (c)     Time Frame (Patient Education Goal, PT) long term goal (LTG); 10 days  -SB (r) BH (t) SB (c)     Progress/Outcome (Patient Education Goal, PT) goal ongoing  -SB (r) BH (t) SB (c)           User Key  (r) = Recorded By, (t) = Taken By, (c) = Cosigned By    Initials Name Provider Type    Alyssa Roberts, PT DPT Physical Therapist    Juliana Aguiar, PT Student PT Student               Clinical Impression     Row Name 11/04/21 1403          Pain    Additional Documentation Pain Scale: Numbers Pre/Post-Treatment (Group)  -SB (r) BH (t) SB (c)     Row Name 11/04/21 1402          Pain Scale: Numbers Pre/Post-Treatment    Pretreatment Pain Rating 8/10  -SB (r) BH (t) SB (c)     Pain Location back  -SB (r) BH (t) SB (c)     Pre/Posttreatment Pain Comment Pt reported no c/o of numbness or tingling.  -SB (r) BH (t) SB (c)     Pain Intervention(s) Medication (See MAR); Repositioned; Ambulation/increased activity  -SB (r) BH (t) SB (c)     Row Name 11/04/21 140          Plan of Care Review    Plan of Care Reviewed With patient; family  -SB (r) BH (t) SB (c)     Progress no change  -SB (r) BH (t) SB (c)     Outcome Summary PT eval complete. Pt found in fowlers position, drowsy from recent admin of pain medication, w c/o of 8/10 back pain. Unable to obtain complete history and prior level of function due to pt's decreased alertness from medication. Educated pt on spinal precautions with verbal understanding demonstrated from patient.  Pt reported intact symmetrical BLE sensation when assessed at beginning of eval but was unable to correctly identify location of touch when sensation was assessed with eyes closed. LLE PROM hip flexion and knee ext WFL, decreased PROM ankle DF. RLE PROM hip flexion WFL, decreased PROM knee ext and ankle DF. Pt unable to actively move BLE against gravity when sitting EOB, general BLE MMT hip flex 1/5, hip abd 2-/5, hip add 2-/5, knee ext 1/5, ankle DF 1/5. Impaired proprioception for B ankles and great toes. Rolling R/L in bed ModA, scooting pt up in bed dependent x 2 person assist, sidelying/sit ModA-MaxA x 2, sit/sidelying MaxA x 2, and sit/stand MaxA x 2. Attempted to stand pt from EOB, however pt unable to actively contract BLE muscles in order to perform transfer; pt's bottom remained on EOB. Static/dynamic sitting balance moderately impaired req assistance for pt to remain upright. Pt left in bed with call light in reach. PT indicated to address functional limitations and deficits hindering pt's overall quality of life. Recommended d/c to SNF vs IRF pending pt progress.  -SB (r) BH (t) SB (c)     Row Name 11/04/21 1403          Therapy Assessment/Plan (PT)    Patient/Family Therapy Goals Statement (PT) Improve functional mobility.  -SB (r) BH (t) SB (c)     Rehab Potential (PT) good, to achieve stated therapy goals  -SB (r) BH (t) SB (c)     Criteria for Skilled Interventions Met (PT) yes; meets criteria; skilled treatment is necessary  -SB (r) BH (t) SB (c)     Predicted Duration of Therapy Intervention (PT) Until d/c or goals are met.  -SB (r) BH (t) SB (c)     Row Name 11/04/21 1408          Vital Signs    O2 Delivery Pre Treatment room air  -SB (r) BH (t) SB (c)     O2 Delivery Intra Treatment room air  -SB (r) BH (t) SB (c)     O2 Delivery Post Treatment room air  -SB (r) BH (t) SB (c)     Row Name 11/04/21 1408          Positioning and Restraints    Pre-Treatment Position in bed  -SB (r) BH (t) SB (c)      Post Treatment Position bed  -SB (r) BH (t) SB (c)     In Bed fowlers; call light within reach; encouraged to call for assist; SCD pump applied; legs elevated; side rails up x3  -SB (r) BH (t) SB (c)           User Key  (r) = Recorded By, (t) = Taken By, (c) = Cosigned By    Initials Name Provider Type    Alyssa Roberts PT DPT Physical Therapist    Juliana Aguiar, PT Student PT Student               Outcome Measures     Row Name 11/04/21 1408          How much help from another person do you currently need...    Turning from your back to your side while in flat bed without using bedrails? 2  -SB (r) BH (t) SB (c)     Moving from lying on back to sitting on the side of a flat bed without bedrails? 2  -SB (r) BH (t) SB (c)     Moving to and from a bed to a chair (including a wheelchair)? 2  -SB (r) BH (t) SB (c)     Standing up from a chair using your arms (e.g., wheelchair, bedside chair)? 2  -SB (r) BH (t) SB (c)     Climbing 3-5 steps with a railing? 1  -SB (r) BH (t) SB (c)     To walk in hospital room? 1  -SB (r) BH (t) SB (c)     AM-PAC 6 Clicks Score (PT) 10  -SB (r) BH (t)     Row Name 11/04/21 1408          Functional Assessment    Outcome Measure Options AM-PAC 6 Clicks Basic Mobility (PT)  -SB (r) BH (t) SB (c)           User Key  (r) = Recorded By, (t) = Taken By, (c) = Cosigned By    Initials Name Provider Type    Alyssa Roberts PT DPT Physical Therapist    Juliana Aguiar, PT Student PT Student                             Physical Therapy Education                 Title: PT OT SLP Therapies (In Progress)     Topic: Physical Therapy (In Progress)     Point: Mobility training (Done)     Learning Progress Summary           Patient Acceptance, E, VU,NR by  at 11/4/2021 8839    Comment: Educated pt on spinal precautions, proper body mechanics during bed mobility transfers, POC, and d/c.                   Point: Home exercise program (Not Started)     Learner Progress:  Not documented in  this visit.          Point: Body mechanics (Done)     Learning Progress Summary           Patient Acceptance, E, VU,NR by  at 11/4/2021 1509    Comment: Educated pt on spinal precautions, proper body mechanics during bed mobility transfers, POC, and d/c.                   Point: Precautions (Done)     Learning Progress Summary           Patient Acceptance, E, VU,NR by  at 11/4/2021 1509    Comment: Educated pt on spinal precautions, proper body mechanics during bed mobility transfers, POC, and d/c.                               User Key     Initials Effective Dates Name Provider Type Discipline     09/07/21 -  Juliana Angela, PT Student PT Student PT              PT Recommendation and Plan  Planned Therapy Interventions (PT): balance training, bed mobility training, strengthening, ROM (range of motion), postural re-education, patient/family education, transfer training  Plan of Care Reviewed With: patient, family  Progress: no change  Outcome Summary: PT eval complete. Pt found in fowlers position, drowsy from recent admin of pain medication, w c/o of 8/10 back pain. Unable to obtain complete history and prior level of function due to pt's decreased alertness from medication. Educated pt on spinal precautions with verbal understanding demonstrated from patient. Pt reported intact symmetrical BLE sensation when assessed at beginning of eval but was unable to correctly identify location of touch when sensation was assessed with eyes closed. LLE PROM hip flexion and knee ext WFL, decreased PROM ankle DF. RLE PROM hip flexion WFL, decreased PROM knee ext and ankle DF. Pt unable to actively move BLE against gravity when sitting EOB, general BLE MMT hip flex 1/5, hip abd 2-/5, hip add 2-/5, knee ext 1/5, ankle DF 1/5. Impaired proprioception for B ankles and great toes. Rolling R/L in bed ModA, scooting pt up in bed dependent x 2 person assist, sidelying/sit ModA-MaxA x 2, sit/sidelying MaxA x 2, and sit/stand  MaxA x 2. Attempted to stand pt from EOB, however pt unable to actively contract BLE muscles in order to perform transfer; pt's bottom remained on EOB. Static/dynamic sitting balance moderately impaired req assistance for pt to remain upright. Pt left in bed with call light in reach. PT indicated to address functional limitations and deficits hindering pt's overall quality of life. Recommended d/c to SNF vs IRF pending pt progress.     Time Calculation:    PT Charges     Row Name 11/04/21 1509             Time Calculation    Start Time 1406  -SB (r) BH (t) SB (c)      Stop Time 1435  + 10 min chart review  -SB (r) BH (t) SB (c)      Time Calculation (min) 29 min  -SB (r) BH (t)      PT Received On 11/04/21  -SB (r) BH (t) SB (c)      PT Goal Re-Cert Due Date 11/14/21  -SB (r) BH (t) SB (c)            User Key  (r) = Recorded By, (t) = Taken By, (c) = Cosigned By    Initials Name Provider Type    Alyssa Roberts, PT DPT Physical Therapist    Juliana Aguiar, PT Student PT Student                  PT G-Codes  Outcome Measure Options: AM-PAC 6 Clicks Basic Mobility (PT)  AM-PAC 6 Clicks Score (PT): 10    JULIANA IZAGUIRRE, PT Student  11/4/2021

## 2021-11-04 NOTE — CASE MANAGEMENT/SOCIAL WORK
Discharge Planning Assessment  [facility]     Patient Name: Tawny Shin  MRN: [id number]  Today's Date: 11/4/2021    Admit Date: 11/4/2021     Discharge Needs Assessment     Row Name 11/04/21 1406       Living Environment    Lives With friend(s); alone    Name(s) of Who Lives With Patient alone but has sitter, Cate Strange that stays as needed    Current Living Arrangements home/apartment/condo    Primary Care Provided by self; other (see comments)  sitters as needed    Provides Primary Care For no one, unable/limited ability to care for self    Caregiving Concerns weakness with spinal pain    Family Caregiver if Needed child(mary jo), adult; sibling(s)    Family Caregiver Names Gee Shin, son;  sister, Skye Retana    Quality of Family Relationships helpful; involved; supportive    Able to Return to Prior Arrangements other (see comments)  acute rehab if possible to continue rehab after decline neurally       Resource/Environmental Concerns    Resource/Environmental Concerns none    Transportation Concerns car, none       Transition Planning    Patient/Family Anticipates Transition to inpatient rehabilitation facility    Patient/Family Anticipated Services at Transition rehabilitation services    Transportation Anticipated family or friend will provide       Discharge Needs Assessment    Readmission Within the Last 30 Days current reason for admission unrelated to previous admission; other (see comments)  decline neurally while at acute rehab    Current Outpatient/Agency/Support Group inpatient rehabilitation facility    Equipment Currently Used at Home walker, rolling; rollator; grab bar; shower chair    Concerns to be Addressed no discharge needs identified    Anticipated Changes Related to Illness inability to care for self    Equipment Needed After Discharge none    Outpatient/Agency/Support Group Needs inpatient rehabilitation facility    Discharge Facility/Level of Care Needs rehabilitation facility     Provided Post Acute Provider List? Yes    Post Acute Provider List Inpatient Rehab    Provided Post Acute Provider Quality & Resource List? Yes    Post Acute Provider Quality and Resource List Inpatient Rehab    Delivered To Patient; Support Person    Support Person Gee Shin    Method of Delivery In person    Patient's Choice of Community Agency(s) [facility]  [facility]  used in past    Discharge Coordination/Progress Patient awaiting possible spinal surgery. Received from [facility] [place]. Would like to return if possible. Neural decline while in therapy requiring rehospitalization. Previous rehab admission was related to sepsis. New diagnosis this admit with spinal concerns per Dr Shea. Sister Skye Stone , at bedside to assist. Patient has PCP and rx coverage. WIll follow for dc needs. SW updated. Patient has used [facility] is past.               Discharge Plan    No documentation.               Continued Care and Services - Admitted Since 11/4/2021    Coordination has not been started for this encounter.     Selected Continued Care - Episodes Includes selections from active Coordinated Care Management episodes    High Risk Care Management Episode start date: 7/14/2021   There are no active outsourced providers for this episode.             Selected Continued Care - Prior Encounters Includes selections from prior encounters from 8/6/2021 to 11/4/2021    Discharged on 10/28/2021 Admission date: 10/21/2021 - Discharge disposition: Rehab Facility or Unit (DC - External)    Destination     Service Provider Selected Services Address Phone Fax Patient Preferred    [facility]  Inpatient Rehabilitation [address], [place] [address] [phone] --                       Demographic Summary    No documentation.                Functional Status    No documentation.                Psychosocial    No documentation.                Abuse/Neglect    No documentation.                 Legal    No documentation.                Substance Abuse    No documentation.                Patient Forms    No documentation.                   Belinda Campos RN

## 2021-11-04 NOTE — THERAPY EVALUATION
Patient Name: Tawny Shin  : 1955    MRN: [id number]                              Today's Date: 2021       Admit Date: 2021    Visit Dx:     ICD-10-CM ICD-9-CM   1. Impaired mobility  Z74.09 799.89   2. Impaired mobility and ADLs  Z74.09 V49.89    Z78.9      Patient Active Problem List   Diagnosis   • T12 compression fracture, initial encounter ([facility])   • Chronic embolism and thrombosis of unspecified deep veins of left lower extremity ([facility])   • Urinary tract infection without hematuria   • Acute bilateral thoracic back pain   • Chronic anticoagulation   • Hypokalemia   • Transaminitis   • Iron deficiency anemia   • Osteoporosis   • Bacteremia   • Moderate malnutrition (CMS/[facility])   • Epidural hematoma ([facility])     Past Medical History:   Diagnosis Date   • Age-related osteoporosis with current pathological fracture 2020   • Arthritis    • Asthma    • Bilateral bunions 2020   • Cancer ([facility])    • Cardiac pacemaker syndrome 2020    Overview:  - heart block - implanted    • Charcot's joint of foot, left 2020   • Chronic deep vein thrombosis (DVT) of right lower extremity ([facility]) 2021   • Chronic pain syndrome 2021   • Chronic sinusitis    • COPD (chronic obstructive pulmonary disease) ([facility])    • Coronary artery disease    • 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   • Elevated cholesterol    • Eustachian tube dysfunction    • Heart disease    • Herpes simplex    • History of transfusion    • Hyperlipidemia    • Hypertension    • Hypothyroidism 2020   • Intrinsic asthma 2020   • Knee dislocation    • Labral tear of right hip joint    • Laryngitis sicca    • Laryngitis, chronic    • Left carotid bruit 3/9/2016   • MI (myocardial infarction) ([facility])    • Myalgia due to statin 2019   • Open wound of right hip 2021   • Osteomyelitis of right femur ([facility]) 2021   • Otorrhea    • Pacemaker 2016   • Primary osteoarthritis of left knee 2020  "  • Psoriasis vulgaris 12/23/2020   • S/P coronary artery stent placement 3/9/2016   • Sensorineural hearing loss    • Seropositive rheumatoid arthritis of multiple sites ([facility]) 12/27/2019    Overview:  -myochrysine '93-'96 -methotrexate '96--->11/98;r/s  restarted 2/99--> 8/14 (anemia) -sulfasalazine- not effective -penicillamine 6/98-->10/98; no effect -leflunomide 11/98--> - Humira '13-->didn't take - Enbrel 12/14-->3/15- no effect!   Last Assessment & Plan:  - \"aching all over\" because she had to be off her anti-rheumatic drugs for 2 weeks in preparation for her R knee surgery - he   • Sick sinus syndrome ([facility]) 12/27/2019   • Sjogren's disease ([facility])    • Spondylolisthesis of lumbar region 1/17/2018   • Syncope, recurrent 2/8/2021     Past Surgical History:   Procedure Laterality Date   • A-V CARDIAC PACEMAKER INSERTION  2016   • ATRIAL CARDIAC PACEMAKER INSERTION     • CARDIAC CATHETERIZATION     • CATARACT EXTRACTION     • CERVICAL CORPECTOMY N/A 3/3/2021    Procedure: CERVICAL 6 CORPECTOMY WITH TITANIUM CAGE WITH NEURO MONITORING;  Surgeon: Bandar Shea MD;  Location:  [facility] OR;  Service: Neurosurgery;  Laterality: N/A;   • COLONOSCOPY  11/08/2011    One fold in the ascending colon which showed ulcer otherwise normal exam   • COLONOSCOPY  11/12/2004    Normal exam repeat in five years   • CORONARY ANGIOPLASTY WITH STENT PLACEMENT      X 2; 2013 & 2014   • ENDOSCOPY  07/10/2014    Normal exam   • FLAP LEG Right 9/14/2021    Procedure: RIGHT GLUTEAL FASCIOCUTANEOUS ADVANCEMENT FLAP AND RIGHT TENSOR FASCIAL JESSICA FLAP;  Surgeon: Amadeo Turner MD;  Location:  [facility] OR;  Service: Plastics;  Laterality: Right;   • HIP ABDUCTION TENOTOMY BILATERAL Right 1/14/2021    Procedure: RIGHT HIP GLUTEUS MEDLUS / MINIMUS REPAIR, POSSIBLE ACHILLES ALLOGRAFT;  Surgeon: Nino Carlson MD;  Location:  [facility] OR;  Service: Orthopedics;  Laterality: Right;   • INCISION AND DRAINAGE HIP Right 2/9/2021    Procedure: HIP " INCISION AND DRAINAGE;  Surgeon: Nino Carlson MD;  Location:  [facility] OR;  Service: Orthopedics;  Laterality: Right;   • INCISION AND DRAINAGE LEG Right 10/24/2021    Procedure: INCISION AND DRAINAGE LOWER EXTREMITY;  Surgeon: Amadeo Turner MD;  Location:  [facility] OR;  Service: Plastics;  Laterality: Right;   • INCISION AND DRAINAGE OF WOUND Right 7/8/2021    Procedure: INCISION AND DRAINAGE WOUND RIGHT HIP;  Surgeon: James Huntley MD;  Location:  [facility] OR;  Service: Orthopedics;  Laterality: Right;   • JOINT REPLACEMENT     • KYPHOPLASTY WITH BIOPSY Bilateral 10/26/2021    Procedure: THOARCIC 12 KYPHOPLASTY WITH BIOPSY;  Surgeon: Bandar Shea MD;  Location:  [facility] OR;  Service: Neurosurgery;  Laterality: Bilateral;   • LEG DEBRIDEMENT Right 9/14/2021    Procedure: DEBRIDEMENT OF RIGHT HIP WOUND, RIGHT GLUTEAL FASCIOCUTANEOUS ADVANCEMENT FLAP AND RIGHT TENSOR FASCIAL JESSICA FLAP;  Surgeon: Amadeo Turner MD;  Location:  [facility] OR;  Service: Plastics;  Laterality: Right;   • LUMBAR DISCECTOMY Right 3/23/2021    Procedure: LUMBAR DISCECTOMY MICRO, Lumbar 1/2 right;  Surgeon: Bandar Shea MD;  Location:  [facility] OR;  Service: Neurosurgery;  Laterality: Right;   • LUMBAR FUSION N/A 1/19/2018    Procedure: L3-4,L4-5 DECOMPRESSION, POSTERIOR SPINAL FUSION WITH INSTRUMENTATION;  Surgeon: Fortino Oropeza MD;  Location: [facility] OR;  Service:    • LUMBAR LAMINECTOMY WITH FUSION Left 1/17/2018    Procedure: LEFT L3-4 L4-5 LATERAL LUMBAR INTERBODY FUSION;  Surgeon: Fortino Oropeza MD;  Location: [facility] OR;  Service:    • MYRINGOTOMY W/ TUBES  09/04/2014    TUBES NO LONGER IN PLACE   • OTHER SURGICAL HISTORY      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 so hardware was removed and spacers were placed   • REPLACEMENT TOTAL KNEE Right       General Information     Row Name 11/04/21 1305          OT Time and Intention    Document Type evaluation  -MW     Mode of Treatment occupational therapy  -MW     Row  Name 11/04/21 1305          General Information    Patient Profile Reviewed yes  -     Existing Precautions/Restrictions fall; spinal  -     Row Name 11/04/21 1305          Occupational Profile    Reason for Services/Referral (Occupational Profile) acute to subacute anterior epidural hematoma with cord compression s/p kyphoplasty 10/26/21 for T12 compression fx, was at [facility] rehab with decline in BLE strength  -     Row Name 11/04/21 1305          Living Environment    Lives With facility resident  presented from [facility], plans for d/c to cont rehab  -     Row Name 11/04/21 1305          Cognition    Orientation Status (Cognition) oriented to; person; place  -     Row Name 11/04/21 1305          Safety Issues, Functional Mobility    Impairments Affecting Function (Mobility) balance; coordination; cognition; endurance/activity tolerance; motor control; postural/trunk control; pain; strength  -MW     Cognitive Impairments, Mobility Safety/Performance attention; problem-solving/reasoning; safety precaution awareness; safety precaution follow-through  -           User Key  (r) = Recorded By, (t) = Taken By, (c) = Cosigned By    Initials Name Provider Type    MW Adeola Payne, OTR/L Occupational Therapist                 Mobility/ADL's     Row Name 11/04/21 1307          Bed Mobility    Bed Mobility rolling left; rolling right; scooting/bridging; sidelying-sit; sit-sidelying  -     Rolling Left [place] (Bed Mobility) moderate assist (50% patient effort); verbal cues; nonverbal cues (demo/gesture)  -     Rolling Right [place] (Bed Mobility) moderate assist (50% patient effort); verbal cues; nonverbal cues (demo/gesture)  -     Scooting/Bridging [place] (Bed Mobility) dependent (less than 25% patient effort); 2 person assist  -     Sidelying-Sit [place] (Bed Mobility) moderate assist (50% patient effort); maximum assist (25% patient effort); 2 person assist; verbal cues  -      Sit-Sidelying [place] (Bed Mobility) maximum assist (25% patient effort); 2 person assist; verbal cues  -     Bed Mobility, Safety Issues decreased use of arms for pushing/pulling; decreased use of legs for bridging/pushing; impaired trunk control for bed mobility  -     Assistive Device (Bed Mobility) bed rails; draw sheet  -     Row Name 11/04/21 1305          Transfers    Transfers sit-stand transfer  -     Comment (Transfers) attempted to stand x1 with no activation of LEs for transfer, unable to get bottom up from bed  -     Sit-Stand [place] (Transfers) maximum assist (25% patient effort); 2 person assist  -     Row Name 11/04/21 1305          Functional Mobility    Functional Mobility- Ind. Level unable to perform  -     Row Name 11/04/21 1305          Activities of Daily Living    BADL Assessment/Intervention lower body dressing  -     Row Name 11/04/21 1305          Lower Body Dressing Assessment/Training    [place] Level (Lower Body Dressing) don; doff; socks; dependent (less than 25% patient effort)  -     Position (Lower Body Dressing) edge of bed sitting  -           User Key  (r) = Recorded By, (t) = Taken By, (c) = Cosigned By    Initials Name Provider Type     Adeola Payne, OTR/L Occupational Therapist               Obj/Interventions     Row Name 11/04/21 1305          Sensory Assessment (Somatosensory)    Sensory Assessment (Somatosensory) UE sensation intact  -     Row Name 11/04/21 1305          Range of Motion Comprehensive    General Range of Motion bilateral upper extremity ROM WFL  -     Row Name 11/04/21 1305          Strength Comprehensive (MMT)    Comment, General Manual Muscle Testing (MMT) Assessment BUE 4/5  -MW     Row Name 11/04/21 1305          Balance    Balance Assessment sitting static balance  -     Static Sitting Balance moderate impairment; sitting, edge of bed  unable to support self with UE  -MW           User Key  (r) =  Recorded By, (t) = Taken By, (c) = Cosigned By    Initials Name Provider Type     Adeola Payne, OTR/L Occupational Therapist               Goals/Plan     Row Name 11/04/21 1305          Transfer Goal 1 (OT)    Activity/Assistive Device (Transfer Goal 1, OT) sit-to-stand/stand-to-sit; bed-to-chair/chair-to-bed; commode, 3-in-1  -MW     [place] Level/Cues Needed (Transfer Goal 1, OT) moderate assist (50-74% patient effort)  -MW     Time Frame (Transfer Goal 1, OT) long term goal (LTG); 10 days  -MW     Progress/Outcome (Transfer Goal 1, OT) goal ongoing  -     Row Name 11/04/21 1305          Bathing Goal 1 (OT)    Activity/Device (Bathing Goal 1, OT) bathing skills, all  sponge bathing in supported sitting or in bed  -MW     [place] Level/Cues Needed (Bathing Goal 1, OT) set-up required  -MW     Time Frame (Bathing Goal 1, OT) long term goal (LTG); 10 days  -MW     Progress/Outcomes (Bathing Goal 1, OT) goal ongoing  -     Row Name 11/04/21 1305          Toileting Goal 1 (OT)    Activity/Device (Toileting Goal 1, OT) toileting skills, all; commode, 3-in-1  -MW     [place] Level/Cues Needed (Toileting Goal 1, OT) moderate assist (50-74% patient effort)  -MW     Time Frame (Toileting Goal 1, OT) long term goal (LTG); 10 days  -MW     Progress/Outcome (Toileting Goal 1, OT) goal ongoing  -     Row Name 11/04/21 1305          Problem Specific Goal 1 (OT)    Problem Specific Goal 1 (OT) x5 min seated task EOB with </= Jose R for balance to decrease fall risk and increase independence  -MW     Time Frame (Problem Specific Goal 1, OT) long term goal (LTG); 10 days  -MW     Progress/Outcome (Problem Specific Goal 1, OT) goal ongoing  -     Row Name 11/04/21 1305          Therapy Assessment/Plan (OT)    Planned Therapy Interventions (OT) activity tolerance training; BADL retraining; functional balance retraining; occupation/activity based interventions; strengthening exercise; transfer/mobility  retraining; patient/caregiver education/training  -           User Key  (r) = Recorded By, (t) = Taken By, (c) = Cosigned By    Initials Name Provider Type    Adeola Sanchez, OTR/L Occupational Therapist               Clinical Impression     Row Name 11/04/21 9338          Pain Assessment    Additional Documentation Pain Scale: Numbers Pre/Post-Treatment (Group)  -MW     Row Name 11/04/21 6259          Pain Scale: Numbers Pre/Post-Treatment    Pretreatment Pain Rating 8/10  -MW     Posttreatment Pain Rating 8/10  -MW     Pain Location back; hip  -MW     Pain Intervention(s) Medication (See MAR); Repositioned; Ambulation/increased activity  -     Row Name 11/04/21 9324          Plan of Care Review    Plan of Care Reviewed With patient; sibling  -MW     Progress no change  -     Outcome Summary OT eval completed. Pt is drowsy from recent pain medication per sister, agreeable to therapy eval. C/o pain in back and hips but no increased exacerbation of pain with mobility. Rolls L/R with modA and vc/tc and use of bed rail. Comes to sitting EOB with modAx2. Pt demos decreased trunk control in sitting requiring varying levels of assist to maintain midline. Pt has difficulty utilizing BUE for trunk support at EOB. Attempted to stand x1, unable to get bottom up from EOB and demos little activation of BLE to attempt stand, deferred and assisted at EOB. Pt demos good formal strength testing of BUE despite limited use during functional tasks. OT indicated to address ADL, bed mobility, and transfer to increase independence and safety. Recommend d/c rehab.  -     Row Name 11/04/21 7436          Therapy Assessment/Plan (OT)    Patient/Family Therapy Goal Statement (OT) decrease pain, increase mobility  -     Rehab Potential (OT) good, to achieve stated therapy goals  -     Criteria for Skilled Therapeutic Interventions Met (OT) yes; skilled treatment is necessary  -     Therapy Frequency (OT) 5 times/wk  -MW      Predicted Duration of Therapy Intervention (OT) 10 days  -     Row Name 11/04/21 1305          Therapy Plan Review/Discharge Plan (OT)    Anticipated Discharge Disposition (OT) skilled nursing facility; inpatient rehabilitation facility  -     Row Name 11/04/21 1305          Positioning and Restraints    Pre-Treatment Position in bed  -MW     Post Treatment Position bed  -MW     In Bed fowlers; call light within reach; encouraged to call for assist; side rails up x3; heels elevated; SCD pump applied  -           User Key  (r) = Recorded By, (t) = Taken By, (c) = Cosigned By    Initials Name Provider Type    MW Adeola Payne, OTR/L Occupational Therapist               Outcome Measures     Row Name 11/04/21 1305          How much help from another is currently needed...    Putting on and taking off regular lower body clothing? 1  -MW     Bathing (including washing, rinsing, and drying) 2  -MW     Toileting (which includes using toilet bed pan or urinal) 2  -MW     Putting on and taking off regular upper body clothing 2  -MW     Taking care of personal grooming (such as brushing teeth) 3  -MW     Eating meals 3  -MW     AM-PAC 6 Clicks Score (OT) 13  -     Row Name 11/04/21 1408          How much help from another person do you currently need...    Turning from your back to your side while in flat bed without using bedrails? 2  -SB (r) BH (t) SB (c)     Moving from lying on back to sitting on the side of a flat bed without bedrails? 2  -SB (r) BH (t) SB (c)     Moving to and from a bed to a chair (including a wheelchair)? 2  -SB (r) BH (t) SB (c)     Standing up from a chair using your arms (e.g., wheelchair, bedside chair)? 2  -SB (r) BH (t) SB (c)     Climbing 3-5 steps with a railing? 1  -SB (r) BH (t) SB (c)     To walk in hospital room? 1  -SB (r) BH (t) SB (c)     AM-PAC 6 Clicks Score (PT) 10  -SB (r) BH (t)     Row Name 11/04/21 1408 11/04/21 1305       Functional Assessment    Outcome Measure  Options AM-PAC 6 Clicks Basic Mobility (PT)  -SB (r)  (t) SB (c) AM-PAC 6 Clicks Daily Activity (OT)  -          User Key  (r) = Recorded By, (t) = Taken By, (c) = Cosigned By    Initials Name Provider Type     Adeola Payne, OTR/L Occupational Therapist    Alyssa Roberts, PT DPT Physical Therapist    Juliana Aguiar, PT Student PT Student                Occupational Therapy Education                 Title: PT OT SLP Therapies (In Progress)     Topic: Occupational Therapy (In Progress)     Point: ADL training (In Progress)     Description:   Instruct learner(s) on proper safety adaptation and remediation techniques during self care or transfers.   Instruct in proper use of assistive devices.              Learning Progress Summary           Patient Acceptance, E,D, NR by  at 11/4/2021 1545                   Point: Home exercise program (In Progress)     Description:   Instruct learner(s) on appropriate technique for monitoring, assisting and/or progressing therapeutic exercises/activities.              Learning Progress Summary           Patient Acceptance, E,D, NR by  at 11/4/2021 1545                   Point: Precautions (In Progress)     Description:   Instruct learner(s) on prescribed precautions during self-care and functional transfers.              Learning Progress Summary           Patient Acceptance, E,D, NR by  at 11/4/2021 1545                   Point: Body mechanics (In Progress)     Description:   Instruct learner(s) on proper positioning and spine alignment during self-care, functional mobility activities and/or exercises.              Learning Progress Summary           Patient Acceptance, E,D, NR by  at 11/4/2021 1545                               User Key     Initials Effective Dates Name Provider Type [facility] 08/28/18 -  Adeola Payne, OTR/L Occupational Therapist OT              OT Recommendation and Plan  Planned Therapy Interventions (OT): activity tolerance  training, BADL retraining, functional balance retraining, occupation/activity based interventions, strengthening exercise, transfer/mobility retraining, patient/caregiver education/training  Therapy Frequency (OT): 5 times/wk  Plan of Care Review  Plan of Care Reviewed With: patient, sibling  Progress: no change  Outcome Summary: OT eval completed. Pt is drowsy from recent pain medication per sister, agreeable to therapy eval. C/o pain in back and hips but no increased exacerbation of pain with mobility. Rolls L/R with modA and vc/tc and use of bed rail. Comes to sitting EOB with modAx2. Pt demos decreased trunk control in sitting requiring varying levels of assist to maintain midline. Pt has difficulty utilizing BUE for trunk support at EOB. Attempted to stand x1, unable to get bottom up from EOB and demos little activation of BLE to attempt stand, deferred and assisted at EOB. Pt demos good formal strength testing of BUE despite limited use during functional tasks. OT indicated to address ADL, bed mobility, and transfer to increase independence and safety. Recommend d/c rehab.     Time Calculation:    Time Calculation- OT     Row Name 11/04/21 1545             Time Calculation- OT    OT Start Time 1406  eval initiated at 1305 and x10 min in room plus 10 min chart review  -      OT Stop Time 1435  -MW      OT Time Calculation (min) 29 min  -MW      OT Received On 11/04/21  -      OT Goal Re-Cert Due Date 11/14/21  -            User Key  (r) = Recorded By, (t) = Taken By, (c) = Cosigned By    Initials Name Provider Type     Adeola Payne, OTR/L Occupational Therapist              Therapy Charges for Today     Code Description Service Date Service Provider Modifiers Qty    [id number]  OT EVAL MOD COMPLEXITY 3 11/4/2021 Adeola Payne, OTR/L  1               KATHYA Corado/L  11/4/2021

## 2021-11-05 LAB
ALBUMIN SERPL-MCNC: 2.8 G/DL (ref 3.5–5.2)
ALBUMIN/GLOB SERPL: 0.7 G/DL
ALP SERPL-CCNC: 154 U/L (ref 39–117)
ALT SERPL W P-5'-P-CCNC: <5 U/L (ref 1–33)
ANION GAP SERPL CALCULATED.3IONS-SCNC: 8 MMOL/L (ref 5–15)
AST SERPL-CCNC: 21 U/L (ref 1–32)
BASOPHILS # BLD AUTO: 0.05 10*3/MM3 (ref 0–0.2)
BASOPHILS NFR BLD AUTO: 0.9 % (ref 0–1.5)
BH BB BLOOD EXPIRATION DATE: NORMAL
BH BB BLOOD EXPIRATION DATE: NORMAL
BH BB BLOOD TYPE BARCODE: 6200
BH BB BLOOD TYPE BARCODE: 6200
BH BB DISPENSE STATUS: NORMAL
BH BB DISPENSE STATUS: NORMAL
BH BB PRODUCT CODE: NORMAL
BH BB PRODUCT CODE: NORMAL
BH BB UNIT NUMBER: NORMAL
BH BB UNIT NUMBER: NORMAL
BILIRUB SERPL-MCNC: 0.5 MG/DL (ref 0–1.2)
BUN SERPL-MCNC: 25 MG/DL (ref 8–23)
BUN/CREAT SERPL: 28.7 (ref 7–25)
CALCIUM SPEC-SCNC: 8.8 MG/DL (ref 8.6–10.5)
CHLORIDE SERPL-SCNC: 100 MMOL/L (ref 98–107)
CO2 SERPL-SCNC: 27 MMOL/L (ref 22–29)
CREAT SERPL-MCNC: 0.87 MG/DL (ref 0.57–1)
CRP SERPL-MCNC: 9.84 MG/DL (ref 0–0.5)
DEPRECATED RDW RBC AUTO: 59.1 FL (ref 37–54)
EOSINOPHIL # BLD AUTO: 0.1 10*3/MM3 (ref 0–0.4)
EOSINOPHIL NFR BLD AUTO: 1.9 % (ref 0.3–6.2)
ERYTHROCYTE [DISTWIDTH] IN BLOOD BY AUTOMATED COUNT: 18.7 % (ref 12.3–15.4)
GFR SERPL CREATININE-BSD FRML MDRD: 65 ML/MIN/1.73
GLOBULIN UR ELPH-MCNC: 4.1 GM/DL
GLUCOSE SERPL-MCNC: 116 MG/DL (ref 65–99)
HCT VFR BLD AUTO: 23.6 % (ref 34–46.6)
HGB BLD-MCNC: 7.2 G/DL (ref 12–15.9)
IMM GRANULOCYTES # BLD AUTO: 0.02 10*3/MM3 (ref 0–0.05)
IMM GRANULOCYTES NFR BLD AUTO: 0.4 % (ref 0–0.5)
LYMPHOCYTES # BLD AUTO: 1.67 10*3/MM3 (ref 0.7–3.1)
LYMPHOCYTES NFR BLD AUTO: 31 % (ref 19.6–45.3)
MCH RBC QN AUTO: 26.7 PG (ref 26.6–33)
MCHC RBC AUTO-ENTMCNC: 30.5 G/DL (ref 31.5–35.7)
MCV RBC AUTO: 87.4 FL (ref 79–97)
MONOCYTES # BLD AUTO: 0.79 10*3/MM3 (ref 0.1–0.9)
MONOCYTES NFR BLD AUTO: 14.7 % (ref 5–12)
NEUTROPHILS NFR BLD AUTO: 2.76 10*3/MM3 (ref 1.7–7)
NEUTROPHILS NFR BLD AUTO: 51.1 % (ref 42.7–76)
NRBC BLD AUTO-RTO: 0 /100 WBC (ref 0–0.2)
PLATELET # BLD AUTO: 258 10*3/MM3 (ref 140–450)
PMV BLD AUTO: 9 FL (ref 6–12)
POTASSIUM SERPL-SCNC: 4.6 MMOL/L (ref 3.5–5.2)
PREALB SERPL-MCNC: 13.6 MG/DL (ref 20–40)
PROT SERPL-MCNC: 6.9 G/DL (ref 6–8.5)
RBC # BLD AUTO: 2.7 10*6/MM3 (ref 3.77–5.28)
SODIUM SERPL-SCNC: 135 MMOL/L (ref 136–145)
UNIT  ABO: NORMAL
UNIT  ABO: NORMAL
UNIT  RH: NORMAL
UNIT  RH: NORMAL
WBC # BLD AUTO: 5.39 10*3/MM3 (ref 3.4–10.8)

## 2021-11-05 PROCEDURE — 86140 C-REACTIVE PROTEIN: CPT | Performed by: NURSE PRACTITIONER

## 2021-11-05 PROCEDURE — 25010000002 CEFAZOLIN PER 500 MG: Performed by: NEUROLOGICAL SURGERY

## 2021-11-05 PROCEDURE — 80053 COMPREHEN METABOLIC PANEL: CPT | Performed by: NURSE PRACTITIONER

## 2021-11-05 PROCEDURE — 97110 THERAPEUTIC EXERCISES: CPT

## 2021-11-05 PROCEDURE — 92610 EVALUATE SWALLOWING FUNCTION: CPT

## 2021-11-05 PROCEDURE — 84134 ASSAY OF PREALBUMIN: CPT | Performed by: NURSE PRACTITIONER

## 2021-11-05 PROCEDURE — 99024 POSTOP FOLLOW-UP VISIT: CPT | Performed by: NURSE PRACTITIONER

## 2021-11-05 PROCEDURE — 85025 COMPLETE CBC W/AUTO DIFF WBC: CPT | Performed by: NURSE PRACTITIONER

## 2021-11-05 PROCEDURE — 97530 THERAPEUTIC ACTIVITIES: CPT

## 2021-11-05 RX ADMIN — ACETAMINOPHEN 650 MG: 325 TABLET, FILM COATED ORAL at 12:16

## 2021-11-05 RX ADMIN — CEFAZOLIN 2 G: 10 INJECTION, POWDER, FOR SOLUTION INTRAVENOUS at 16:51

## 2021-11-05 RX ADMIN — HYDROCODONE BITARTRATE AND ACETAMINOPHEN 1 TABLET: 10; 325 TABLET ORAL at 23:08

## 2021-11-05 RX ADMIN — FUROSEMIDE 40 MG: 40 TABLET ORAL at 08:09

## 2021-11-05 RX ADMIN — DOCUSATE SODIUM 50 MG AND SENNOSIDES 8.6 MG 2 TABLET: 8.6; 5 TABLET, FILM COATED ORAL at 20:33

## 2021-11-05 RX ADMIN — SUCRALFATE 1 G: 1 TABLET ORAL at 16:51

## 2021-11-05 RX ADMIN — HYDROCODONE BITARTRATE AND ACETAMINOPHEN 1 TABLET: 10; 325 TABLET ORAL at 10:13

## 2021-11-05 RX ADMIN — POTASSIUM CHLORIDE 20 MEQ: 10 CAPSULE, COATED, EXTENDED RELEASE ORAL at 08:08

## 2021-11-05 RX ADMIN — LIDOCAINE 1 PATCH: 50 PATCH CUTANEOUS at 12:16

## 2021-11-05 RX ADMIN — FLUTICASONE PROPIONATE 1 SPRAY: 50 SPRAY, METERED NASAL at 08:14

## 2021-11-05 RX ADMIN — PANTOPRAZOLE SODIUM 40 MG: 40 TABLET, DELAYED RELEASE ORAL at 08:08

## 2021-11-05 RX ADMIN — POTASSIUM CHLORIDE 20 MEQ: 10 CAPSULE, COATED, EXTENDED RELEASE ORAL at 20:32

## 2021-11-05 RX ADMIN — Medication 250 MG: at 08:09

## 2021-11-05 RX ADMIN — DOCUSATE SODIUM 100 MG: 100 CAPSULE, LIQUID FILLED ORAL at 08:09

## 2021-11-05 RX ADMIN — FERROUS SULFATE TAB 325 MG (65 MG ELEMENTAL FE) 325 MG: 325 (65 FE) TAB at 08:10

## 2021-11-05 RX ADMIN — THIAMINE HCL TAB 100 MG 250 MG: 100 TAB at 08:07

## 2021-11-05 RX ADMIN — CEFAZOLIN 2 G: 10 INJECTION, POWDER, FOR SOLUTION INTRAVENOUS at 00:52

## 2021-11-05 RX ADMIN — VALACYCLOVIR HYDROCHLORIDE 500 MG: 500 TABLET, FILM COATED ORAL at 08:09

## 2021-11-05 RX ADMIN — CEFAZOLIN 2 G: 10 INJECTION, POWDER, FOR SOLUTION INTRAVENOUS at 23:08

## 2021-11-05 RX ADMIN — HYDROCODONE BITARTRATE AND ACETAMINOPHEN 1 TABLET: 7.5; 325 TABLET ORAL at 05:31

## 2021-11-05 RX ADMIN — SODIUM CHLORIDE, PRESERVATIVE FREE 10 ML: 5 INJECTION INTRAVENOUS at 20:32

## 2021-11-05 RX ADMIN — ACETAMINOPHEN 650 MG: 325 TABLET, FILM COATED ORAL at 16:51

## 2021-11-05 RX ADMIN — CARVEDILOL 25 MG: 25 TABLET, FILM COATED ORAL at 08:10

## 2021-11-05 RX ADMIN — SUCRALFATE 1 G: 1 TABLET ORAL at 20:33

## 2021-11-05 RX ADMIN — DOCUSATE SODIUM 50 MG AND SENNOSIDES 8.6 MG 2 TABLET: 8.6; 5 TABLET, FILM COATED ORAL at 08:10

## 2021-11-05 RX ADMIN — LEVOTHYROXINE SODIUM 75 MCG: 75 TABLET ORAL at 05:31

## 2021-11-05 RX ADMIN — MAGNESIUM GLUCONATE 500 MG ORAL TABLET 400 MG: 500 TABLET ORAL at 08:10

## 2021-11-05 RX ADMIN — SUCRALFATE 1 G: 1 TABLET ORAL at 08:10

## 2021-11-05 RX ADMIN — CEFAZOLIN 2 G: 10 INJECTION, POWDER, FOR SOLUTION INTRAVENOUS at 08:10

## 2021-11-05 RX ADMIN — FOLIC ACID 1 MG: 1 TABLET ORAL at 08:09

## 2021-11-05 RX ADMIN — TRAMADOL HYDROCHLORIDE 50 MG: 50 TABLET ORAL at 08:08

## 2021-11-05 RX ADMIN — ISOSORBIDE MONONITRATE 60 MG: 60 TABLET, EXTENDED RELEASE ORAL at 08:09

## 2021-11-05 RX ADMIN — FLUTICASONE PROPIONATE 1 SPRAY: 50 SPRAY, METERED NASAL at 20:32

## 2021-11-05 RX ADMIN — SUCRALFATE 1 G: 1 TABLET ORAL at 12:16

## 2021-11-05 RX ADMIN — POLYETHYLENE GLYCOL 3350 17 G: 17 POWDER, FOR SOLUTION ORAL at 08:14

## 2021-11-05 RX ADMIN — SODIUM CHLORIDE, PRESERVATIVE FREE 10 ML: 5 INJECTION INTRAVENOUS at 08:14

## 2021-11-05 NOTE — CONSULTS
Mrs. Shin is awake In bed this morning and conversant.  She is a little confused about the details surrounding her recent care and inpatient rehabilitation and return to the hospital yesterday.  She believes her right hip dressing has been changed while at rehab but is uncertain if it has been daily.  She is not complaining of pain in her hip.    The right hip wound remains open with a current dressing in place.  The dressing is dated 11/3.  Removal of the dressing reveals a 1/4 inch Nu Gauze wick.  Surrounding skin is healthy.  There is no purulent drainage.  There is scant serous drainage on the overlying gauze.  Gentle probing of the wound with a sterile cotton tip applicator indicates that it is approximately 6 cm deep at this point.  No foul odor.    This is a 66-year-old female who returned to the hospital yesterday evening after a several day history of gradually worsening loss of function of her lower extremities.  She Admitted under the care of Dr. Shea with presumed late complication following T12 kyphoplasty performed on 10/26/21.  This is a patient I know very well and have been following for some time for care of a prior chronic right hip wound.  I performed an I&D of a seroma of her right hip during her last admission, and we have been gently packing the operative site with Nu Gauze since that time.  She seems to be closing this wound and gradually on her own.  There is no evidence of infection.  I have demonstrated appropriate wound care to the nurse.  We will continue daily dressing changes with iodoform gauze.

## 2021-11-05 NOTE — CASE MANAGEMENT/SOCIAL WORK
Continued Stay Note  [facility]     Patient Name: Tawny Shin  MRN: [id number]  Today's Date: 11/5/2021    Admit Date: 11/4/2021     Discharge Plan     Row Name 11/05/21 0850       Plan    Plan Acute Rehab vs SNF    Patient/Family in Agreement with Plan yes    Plan Comments CM assessed pt yesterday.  Family prefers pt returning to [facility] but was told they would not be able to accept pt back.  If Acute Rehab pursued would need to be another acute rehab facility.  Note therapy saying pt will need acute rehab vs SNF.  Will follow.               Discharge Codes    No documentation.                     BRIAN Muñoz

## 2021-11-05 NOTE — DISCHARGE SUMMARY
Occupational Therapy Discharge Summary         Date: 2021  Patient Name: Don Ferguson        MRN: [id number]    : 1955  (74 y.o.)  Gender: female      Diagnosis: T12 compression fx s/p kyphoplasty, I&D right hip   Restrictions/Precautions  Restrictions/Precautions: Weight Bearing, Fall Risk  Required Braces or Orthoses?: Yes  Implants present? : Pacemaker      Discharge Date: 21      UE Functioning:  WFLs    Home Management:  NT    Adaptive Equipment/DME Status: To be determined    Pain Assessment:  Pain Level: 10  Pain Location: Back    Remaining Problems:  Decreased functional mobility ; Decreased ADL status; Decreased strength; Decreased high-level IADLs; Decreased balance; Decreased endurance; Decreased cognition    STGs:  Short term goals  Time Frame for Short term goals: 1 week  Short term goal 1: Max A with clothing management/hygiene for toileting. Short term goal 2: Max A with toilet transfers. Short term goal 3: Max A with bathing hygiene. Short term goal 4: Max A with LB dressing using AE. Short term goal 5: Min A with UB dressing. Short term goal 6: Pt will set at EOB to complete self care tasks, requiring supervision with sitting balance. LTGs:  Long term goals  Time Frame for Long term goals : 2 week  Long term goal 1: Mod A with clothing management/hygiene for toileting. Long term goal 2: Mod A with toilet transfers. Long term goal 3: Mod A with bathing hygiene. Long term goal 4: Mod A with LB dressing using AE. Long term goal 5: Pt will verbalize DME needs. Long term goals 6: Supervision with UB dressing.       Discharge Setting and Recommendations:  Acute care and another facility    Discharge Care Scores  Eating: CARE Score: 5  Oral Hygiene: CARE Score: 4  Toileting: CARE Score: 1  Shower/Bathe: CARE Score: 3  Upper Body Dressing: CARE Score: 3  Lower Body Dressing: CARE Score: 1  Footwear: CARE Score: 1  Toilet Transfers: CARE Score: 88  Picking Up Object:  CARE Score: 80    Electronically signed by Surekha Corona OT on 11/5/21 at 10:51 AM CDT

## 2021-11-05 NOTE — THERAPY TREATMENT NOTE
Patient Name: Tawny Shin  : 1955    MRN: [id number]                              Today's Date: 2021       Admit Date: 2021    Visit Dx:     ICD-10-CM ICD-9-CM   1. Impaired mobility  Z74.09 799.89   2. Impaired mobility and ADLs  Z74.09 V49.89    Z78.9    3. Dysphagia, unspecified type  R13.10 787.20     Patient Active Problem List   Diagnosis   • T12 compression fracture, initial encounter ([facility]ly [facility])   • Chronic embolism and thrombosis of unspecified deep veins of left lower extremity ([facility])   • Urinary tract infection without hematuria   • Acute bilateral thoracic back pain   • Chronic anticoagulation   • Hypokalemia   • Transaminitis   • Iron deficiency anemia   • Osteoporosis   • Bacteremia   • Moderate malnutrition (CMS/[facility])   • Epidural hematoma ([facility])     Past Medical History:   Diagnosis Date   • Age-related osteoporosis with current pathological fracture 2020   • Arthritis    • Asthma    • Bilateral bunions 2020   • Cancer ([facility])    • Cardiac pacemaker syndrome 2020    Overview:  - heart block - implanted    • Charcot's joint of foot, left 2020   • Chronic deep vein thrombosis (DVT) of right lower extremity ([facility]) 2021   • Chronic pain syndrome 2021   • Chronic sinusitis    • COPD (chronic obstructive pulmonary disease) ([facility])    • Coronary artery disease    • 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   • Elevated cholesterol    • Eustachian tube dysfunction    • Heart disease    • Herpes simplex    • History of transfusion    • Hyperlipidemia    • Hypertension    • Hypothyroidism 2020   • Intrinsic asthma 2020   • Knee dislocation    • Labral tear of right hip joint    • Laryngitis sicca    • Laryngitis, chronic    • Left carotid bruit 3/9/2016   • MI (myocardial infarction) ([facility])    • Myalgia due to statin 2019   • Open wound of right hip 2021   • Osteomyelitis of right femur ([facility]) 2021   • Otorrhea    • Pacemaker 2016  "  • Primary osteoarthritis of left knee 12/23/2020   • Psoriasis vulgaris 12/23/2020   • S/P coronary artery stent placement 3/9/2016   • Sensorineural hearing loss    • Seropositive rheumatoid arthritis of multiple sites (HCC) 12/27/2019    Overview:  -myochrysine '93-'96 -methotrexate '96--->11/98;r/s  restarted 2/99--> 8/14 (anemia) -sulfasalazine- not effective -penicillamine 6/98-->10/98; no effect -leflunomide 11/98--> - Humira '13-->didn't take - Enbrel 12/14-->3/15- no effect!   Last Assessment & Plan:  - \"aching all over\" because she had to be off her anti-rheumatic drugs for 2 weeks in preparation for her R knee surgery - he   • Sick sinus syndrome (HCC) 12/27/2019   • Sjogren's disease ([facility])    • Spondylolisthesis of lumbar region 1/17/2018   • Syncope, recurrent 2/8/2021     Past Surgical History:   Procedure Laterality Date   • A-V CARDIAC PACEMAKER INSERTION  2016   • ATRIAL CARDIAC PACEMAKER INSERTION     • CARDIAC CATHETERIZATION     • CATARACT EXTRACTION     • CERVICAL CORPECTOMY N/A 3/3/2021    Procedure: CERVICAL 6 CORPECTOMY WITH TITANIUM CAGE WITH NEURO MONITORING;  Surgeon: Bandar Shea MD;  Location: [facility] OR;  Service: Neurosurgery;  Laterality: N/A;   • COLONOSCOPY  11/08/2011    One fold in the ascending colon which showed ulcer otherwise normal exam   • COLONOSCOPY  11/12/2004    Normal exam repeat in five years   • CORONARY ANGIOPLASTY WITH STENT PLACEMENT      X 2; 2013 & 2014   • ENDOSCOPY  07/10/2014    Normal exam   • FLAP LEG Right 9/14/2021    Procedure: RIGHT GLUTEAL FASCIOCUTANEOUS ADVANCEMENT FLAP AND RIGHT TENSOR FASCIAL JESSICA FLAP;  Surgeon: Amadeo Turner MD;  Location:  [facility] OR;  Service: Plastics;  Laterality: Right;   • HIP ABDUCTION TENOTOMY BILATERAL Right 1/14/2021    Procedure: RIGHT HIP GLUTEUS MEDLUS / MINIMUS REPAIR, POSSIBLE ACHILLES ALLOGRAFT;  Surgeon: Nino Carlson MD;  Location:  [facility] OR;  Service: Orthopedics;  Laterality: Right;   • INCISION " AND DRAINAGE HIP Right 2/9/2021    Procedure: HIP INCISION AND DRAINAGE;  Surgeon: Nino Carlson MD;  Location:  [facility] OR;  Service: Orthopedics;  Laterality: Right;   • INCISION AND DRAINAGE LEG Right 10/24/2021    Procedure: INCISION AND DRAINAGE LOWER EXTREMITY;  Surgeon: Amadeo Turner MD;  Location:  [facility] OR;  Service: Plastics;  Laterality: Right;   • INCISION AND DRAINAGE OF WOUND Right 7/8/2021    Procedure: INCISION AND DRAINAGE WOUND RIGHT HIP;  Surgeon: James Huntley MD;  Location:  [facility] OR;  Service: Orthopedics;  Laterality: Right;   • JOINT REPLACEMENT     • KYPHOPLASTY WITH BIOPSY Bilateral 10/26/2021    Procedure: THOARCIC 12 KYPHOPLASTY WITH BIOPSY;  Surgeon: Bandar Shea MD;  Location:  [facility] OR;  Service: Neurosurgery;  Laterality: Bilateral;   • LEG DEBRIDEMENT Right 9/14/2021    Procedure: DEBRIDEMENT OF RIGHT HIP WOUND, RIGHT GLUTEAL FASCIOCUTANEOUS ADVANCEMENT FLAP AND RIGHT TENSOR FASCIAL JESSICA FLAP;  Surgeon: Amadeo Turner MD;  Location:  [facility] OR;  Service: Plastics;  Laterality: Right;   • LUMBAR DISCECTOMY Right 3/23/2021    Procedure: LUMBAR DISCECTOMY MICRO, Lumbar 1/2 right;  Surgeon: Bandar Shea MD;  Location:  [facility] OR;  Service: Neurosurgery;  Laterality: Right;   • LUMBAR FUSION N/A 1/19/2018    Procedure: L3-4,L4-5 DECOMPRESSION, POSTERIOR SPINAL FUSION WITH INSTRUMENTATION;  Surgeon: Fortino Oropeza MD;  Location:  [facility] OR;  Service:    • LUMBAR LAMINECTOMY WITH FUSION Left 1/17/2018    Procedure: LEFT L3-4 L4-5 LATERAL LUMBAR INTERBODY FUSION;  Surgeon: Fortino Oropeza MD;  Location:  [facility] OR;  Service:    • MYRINGOTOMY W/ TUBES  09/04/2014    TUBES NO LONGER IN PLACE   • OTHER SURGICAL HISTORY      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 so hardware was removed and spacers were placed   • REPLACEMENT TOTAL KNEE Right       General Information     Row Name 11/05/21 1053 11/05/21 1050       OT Time and Intention    Document Type --  -DESHAWN  (r) EJ (t) MW (c) therapy note (daily note)  -MW (r) EJ (t) MW (c)    Mode of Treatment --  -MW (r) EJ (t) MW (c) occupational therapy  -MW (r) EJ (t) MW (c)    Row Name 11/05/21 1053 11/05/21 1050       General Information    Patient Profile Reviewed --  -MW (r) EJ (t) MW (c) yes  -MW (r) EJ (t) MW (c)    Existing Precautions/Restrictions --  -MW (r) EJ (t) MW (c) fall; spinal; brace worn when out of bed  -MW (r) EJ (t) MW (c)    Row Name 11/05/21 1053 11/05/21 1050       Cognition    Orientation Status (Cognition) --  -MW (r) EJ (t) MW (c) oriented to; time  -MW (r) EJ (t) MW (c)    Row Name 11/05/21 1053 11/05/21 1050       Safety Issues, Functional Mobility    Safety Issues Affecting Function (Mobility) --  -MW (r) EJ (t) MW (c) insight into deficits/self-awareness  -MW (r) EJ (t) MW (c)    Impairments Affecting Function (Mobility) --  -MW (r) EJ (t) MW (c) balance; endurance/activity tolerance; pain; range of motion (ROM); sensation/sensory awareness; strength  -MW (r) EJ (t) MW (c)    Cognitive Impairments, Mobility Safety/Performance --  -MW (r) EJ (t) MW (c) awareness, need for assistance; insight into deficits/self-awareness  -MW (r) EJ (t) MW (c)          User Key  (r) = Recorded By, (t) = Taken By, (c) = Cosigned By    Initials Name Provider Type    MW Adeola Payne, OTR/L Occupational Therapist    Ashley Torres, LINDSAY Student OT Student                 Mobility/ADL's     Row Name 11/05/21 1050          Bed Mobility    Comment (Bed Mobility) pt rf bed mob - laying fowlers in bed during tx  -MW (r) EJ (t) MW (c)     Row Name 11/05/21 1050          Transfers    Comment (Transfers) not attempted  -MW (r) EJ (t) MW (c)     Row Name 11/05/21 1050          Functional Mobility    Functional Mobility- Comment not attempted  -MW (r) EJ (t) MW (c)     Row Name 11/05/21 1050          Lower Body Dressing Assessment/Training    [place] Level (Lower Body Dressing) doff; don; socks; dependent (less than 25%  patient effort)  -MW (r) EJ (t) MW (c)     Position (Lower Body Dressing) supine  -MW (r) EJ (t) MW (c)           User Key  (r) = Recorded By, (t) = Taken By, (c) = Cosigned By    Initials Name Provider Type    Adeola Sanchez, OTR/L Occupational Therapist    Ashley Torres, OT Student OT Student               Obj/Interventions     Row Name 11/05/21 1050          Shoulder (Therapeutic Exercise)    Shoulder (Therapeutic Exercise) strengthening exercise  -MW (r) EJ (t) MW (c)     Shoulder Strengthening (Therapeutic Exercise) bilateral; flexion; extension; aBduction; aDduction; 10 repetitions; 2 sets  -MW (r) EJ (t) MW (c)     Row Name 11/05/21 1050          Elbow/Forearm (Therapeutic Exercise)    Elbow/Forearm (Therapeutic Exercise) strengthening exercise  -MW (r) EJ (t) MW (c)     Elbow/Forearm Strengthening (Therapeutic Exercise) bilateral; flexion; extension; 10 repetitions; 2 sets  -MW (r) EJ (t) MW (c)     [facility] Name 11/05/21 1050          Wrist (Therapeutic Exercise)    Wrist (Therapeutic Exercise) strengthening exercise  -MW (r) EJ (t) MW (c)     Wrist Strengthening (Therapeutic Exercise) bilateral; flexion; extension; 10 repetitions; 2 sets  -MW (r) EJ (t) MW (c)     [facility] Name 11/05/21 1050          Motor Skills    Motor Skills functional endurance  -MW (r) EJ (t) MW (c)     Functional Endurance pt c/o pain in LE during BUE exercises - fatigued quickly and often needed to rest during a 10 rep set  -MW (r) EJ (t) MW (c)     Row Name 11/05/21 1050          Therapeutic Exercise    Therapeutic Exercise shoulder; elbow/forearm; wrist  -MW (r) EJ (t) MW (c)           User Key  (r) = Recorded By, (t) = Taken By, (c) = Cosigned By    Initials Name Provider Type    Adeola Sanchez OTR/L Occupational Therapist    Ashley Torres, OT Student OT Student               Goals/Plan    No documentation.                Clinical Impression     Row Name 11/05/21 1050          Pain Assessment    Additional  Documentation Pain Scale: Numbers Pre/Post-Treatment (Group)  -MW (r) EJ (t) MW (c)     Row Name 11/05/21 1050          Pain Scale: Numbers Pre/Post-Treatment    Pretreatment Pain Rating 8/10  -MW (r) EJ (t) MW (c)     Posttreatment Pain Rating 8/10  -MW (r) EJ (t) MW (c)     Pain Location - Side Right  -MW (r) EJ (t) MW (c)     Pain Location extremity  -MW (r) EJ (t) MW (c)     Pre/Posttreatment Pain Comment pt c/o pain in RLE  -MW (r) EJ (t) MW (c)     Pain Intervention(s) Repositioned; Ambulation/increased activity  -MW (r) EJ (t) MW (c)     Row Name 11/05/21 1050          Plan of Care Review    Plan of Care Reviewed With patient  -MW (r) EJ (t) MW (c)     Progress no change  -MW (r) EJ (t) MW (c)     Outcome Summary OT tx complete. Pt fowlers in bed - rf OOB or to sit EOB. Aggreable to BUE exercises. She completes BUE strengthening exercises in all planes and joints 10 reps x 2 sets. She req multiple RB during sets of 10 and was c/o of pain in LE during BUE exercises. She fell asleep x2 during tx and needed additional cues for task completion. Cont OT POC to cont to increase her strength and independence needed for ADL's. Recommend dc to SNF. Continue OT POC  -MW (r) EJ (t) MW (c)     Row Name 11/05/21 1050          Therapy Plan Review/Discharge Plan (OT)    Anticipated Discharge Disposition (OT) skilled nursing facility  -MW (r) EJ (t) MW (c)     Row Name 11/05/21 1050          Vital Signs    O2 Delivery Pre Treatment room air  -MW (r) EJ (t) MW (c)     O2 Delivery Intra Treatment room air  -MW (r) EJ (t) MW (c)     O2 Delivery Post Treatment room air  -MW (r) EJ (t) MW (c)     Row Name 11/05/21 1050          Positioning and Restraints    Pre-Treatment Position in bed  -MW (r) EJ (t) MW (c)     Post Treatment Position bed  -MW (r) EJ (t) MW (c)     In Bed fowlers; call light within reach; encouraged to call for assist; exit alarm on; side rails up x2; SCD pump applied; heels elevated  -MW (r) EJ (t) MW (c)            User Key  (r) = Recorded By, (t) = Taken By, (c) = Cosigned By    Initials Name Provider Type    Adeola Sanchez, OTR/L Occupational Therapist    Ashley Torres, OT Student OT Student               Outcome Measures     Row Name 11/05/21 1050          How much help from another is currently needed...    Putting on and taking off regular lower body clothing? 1  -MW (r) EJ (t) MW (c)     Bathing (including washing, rinsing, and drying) 2  -MW (r) EJ (t) MW (c)     Toileting (which includes using toilet bed pan or urinal) 1  -MW (r) EJ (t) MW (c)     Putting on and taking off regular upper body clothing 2  -MW (r) EJ (t) MW (c)     Taking care of personal grooming (such as brushing teeth) 3  -MW (r) EJ (t) MW (c)     Eating meals 3  -MW (r) EJ (t) MW (c)     AM-PAC 6 Clicks Score (OT) 12  -MW (r) EJ (t)     Row Name 11/05/21 1050          Functional Assessment    Outcome Measure Options AM-PAC 6 Clicks Daily Activity (OT)  -MW (r) EJ (t) MW (c)           User Key  (r) = Recorded By, (t) = Taken By, (c) = Cosigned By    Initials Name Provider Type    Adeola Sanchez, OTR/L Occupational Therapist    Ashley Torres, OT Student OT Student                Occupational Therapy Education                 Title: PT OT SLP Therapies (In Progress)     Topic: Occupational Therapy (In Progress)     Point: ADL training (In Progress)     Description:   Instruct learner(s) on proper safety adaptation and remediation techniques during self care or transfers.   Instruct in proper use of assistive devices.              Learning Progress Summary           Patient Acceptance, E,D, NR by MARCI at 11/5/2021 1204    Acceptance, E,D, NR by DESHAWN at 11/4/2021 1545                   Point: Home exercise program (In Progress)     Description:   Instruct learner(s) on appropriate technique for monitoring, assisting and/or progressing therapeutic exercises/activities.              Learning Progress Summary           Patient  Acceptance, E,D, NR by  at 11/4/2021 1545                   Point: Precautions (In Progress)     Description:   Instruct learner(s) on prescribed precautions during self-care and functional transfers.              Learning Progress Summary           Patient Acceptance, E,D, NR by  at 11/4/2021 1545                   Point: Body mechanics (In Progress)     Description:   Instruct learner(s) on proper positioning and spine alignment during self-care, functional mobility activities and/or exercises.              Learning Progress Summary           Patient Acceptance, E,D, NR by  at 11/5/2021 1204    Acceptance, E,D, NR by  at 11/4/2021 1545                               User Key     Initials Effective Dates Name Provider Type Discipline     08/28/18 -  Adeola Payne, OTR/L Occupational Therapist OT     08/04/21 -  Ashley Wagner OT Student OT Student OT              OT Recommendation and Plan     Plan of Care Review  Plan of Care Reviewed With: patient  Progress: no change  Outcome Summary: OT tx complete. Pt fowlers in bed - rf OOB or to sit EOB. Aggreable to BUE exercises. She completes BUE strengthening exercises in all planes and joints 10 reps x 2 sets. She req multiple RB during sets of 10 and was c/o of pain in LE during BUE exercises. She fell asleep x2 during tx and needed additional cues for task completion. Cont OT POC to cont to increase her strength and independence needed for ADL's. Recommend dc to SNF. Continue OT POC     Time Calculation:    Time Calculation- OT     Row Name 11/05/21 1050             Time Calculation- OT    OT Start Time 1050  -MW (r) EJ (t) MW (c)      OT Stop Time 1113  -MW (r) EJ (t) MW (c)      OT Time Calculation (min) 23 min  -MW (r) EJ (t)      OT Received On 11/05/21  -MW (r) EJ (t) MW (c)            User Key  (r) = Recorded By, (t) = Taken By, (c) = Cosigned By    Initials Name Provider Type     Adeola Payne, OTR/L Occupational Therapist    MARCI Wagner  Vik, OT Student OT Student                       VIK GE, OT Student  11/5/2021

## 2021-11-05 NOTE — PLAN OF CARE
Problem: Adult Inpatient Plan of Care  Goal: Plan of Care Review  Outcome: Ongoing, Progressing  Flowsheets (Taken 11/5/2021 2160)  Progress: (Eval) --  Plan of Care Reviewed With: (Clarissa SMITH. Reinforcements needed for pt.)   patient   other (see comments)  Outcome Summary: Clinical bedside swallowing evaluation completed per SLP. Pt presented with flat affect, delayed responses, had difficulty verbalizing wants and needs at times, and c/o quite significant L leg pain. Repositioned with pillow under L knee, pt reported improvement. Clarissa SMITH, arrived and presented meds crushed in applesauce. She was then presented a full range of consistencies except mechanical soft, provided per SLP. Oral mech exam revealed generalized weakness, as pt presents with overall generalized weakness and deconditioning. She reported poor intake at home as of late, stated that she has lost weight but is uncertain of the amount, and that she has only been able to consume up to one full sized meal amount of PO intake per day over the past two weeks. Due to significant pain, pt was unable to tolerate HOB elevation beyond approximately 30 degrees, which RN reported has been a barrier at time of PO intake since admit. RN reported coughing and wet vocal quality observed with thin liquids this AM. With PO trials at time of SLP eval, pt was noted to have decreased labial seal on the utensil across multiple trials, as well as periods of oral holding of the bolus, which resulted in delayed oral transit. She also appeared to have minimal oral manipulation with all liquid trials, as well as up to three piecemeal swallows with each trial. She exhibited significantly reduced mandibular strength and ROM to accept and masticate regular solids, despite cues to increase bolus size, with prolonged, yet weak oral manipulation. No overt s/s of aspiration were observed, however, pt also stated she has had some coughing with thin liquids at home for at  least six months, and now occurs as frequently at 1x/day. She was educated that given this report and nursing concerns of coughing with thin liquids at times, she is felt to be at a heightened risk for aspiration with thin liquids at this time, especially considering deconditioned state, impaired cognitive function, and positioning limitations during PO due to pain. She was educated that nectar thick liquids may, indeed, be less likely to be aspirated at this time, however, SLP is concerned that this would risk a further decline in overall nutritional intake, which is already significantly limited at this time. She reported she did not eat breakfast this AM. Pt declined option for thickened liquids at this time, knowing the believed risk for aspiration with thin liquids. Educated RN on discussion and plan. RECOMMENDATIONS: Change to mechanical soft diet consistency, continue with regular thin liquid consistency; 1:1 feeds with staff or family, HOB to be elevated as high at pt can tolerate (given pain) with all PO; Meds crushed (if safe to be crushed) in pudding/applesauce; RN to monitor for increased lung congestion, notify SLP if new lung concerns arise; Dietary to add cream based soup to each lunch and dinner tray, as SLP feels this specific food item would allow for easier intake of more consistent PO; Magic cup for nutritional support if able to be provided given consistent carb diet restriction. ST to continue to follow and tx for dysphagia. Thanks!

## 2021-11-05 NOTE — THERAPY TREATMENT NOTE
Acute Care - Physical Therapy Treatment Note  [facility]     Patient Name: Tawny Shin  : 1955  MRN: [id number]  Today's Date: 2021      Visit Dx:     ICD-10-CM ICD-9-CM   1. Impaired mobility  Z74.09 799.89   2. Impaired mobility and ADLs  Z74.09 V49.89    Z78.9      Patient Active Problem List   Diagnosis   • T12 compression fracture, initial encounter ([facility])   • Chronic embolism and thrombosis of unspecified deep veins of left lower extremity ([facility])   • Urinary tract infection without hematuria   • Acute bilateral thoracic back pain   • Chronic anticoagulation   • Hypokalemia   • Transaminitis   • Iron deficiency anemia   • Osteoporosis   • Bacteremia   • Moderate malnutrition (CMS/[facility])   • Epidural hematoma ([facility])     Past Medical History:   Diagnosis Date   • Age-related osteoporosis with current pathological fracture 2020   • Arthritis    • Asthma    • Bilateral bunions 2020   • Cancer ([facility])    • Cardiac pacemaker syndrome 2020    Overview:  - heart block - implanted    • Charcot's joint of foot, left 2020   • Chronic deep vein thrombosis (DVT) of right lower extremity ([facility]) 2021   • Chronic pain syndrome 2021   • Chronic sinusitis    • COPD (chronic obstructive pulmonary disease) ([facility])    • Coronary artery disease    • 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   • Elevated cholesterol    • Eustachian tube dysfunction    • Heart disease    • Herpes simplex    • History of transfusion    • Hyperlipidemia    • Hypertension    • Hypothyroidism 2020   • Intrinsic asthma 2020   • Knee dislocation    • Labral tear of right hip joint    • Laryngitis sicca    • Laryngitis, chronic    • Left carotid bruit 3/9/2016   • MI (myocardial infarction) ([facility])    • Myalgia due to statin 2019   • Open wound of right hip 2021   • Osteomyelitis of right femur ([facility]) 2021   • Otorrhea    • Pacemaker 2016   • Primary osteoarthritis of left knee  "12/23/2020   • Psoriasis vulgaris 12/23/2020   • S/P coronary artery stent placement 3/9/2016   • Sensorineural hearing loss    • Seropositive rheumatoid arthritis of multiple sites ([facility]) 12/27/2019    Overview:  -myochrysine '93-'96 -methotrexate '96--->11/98;r/s  restarted 2/99--> 8/14 (anemia) -sulfasalazine- not effective -penicillamine 6/98-->10/98; no effect -leflunomide 11/98--> - Humira '13-->didn't take - Enbrel 12/14-->3/15- no effect!   Last Assessment & Plan:  - \"aching all over\" because she had to be off her anti-rheumatic drugs for 2 weeks in preparation for her R knee surgery - he   • Sick sinus syndrome ([facility]) 12/27/2019   • Sjogren's disease ([facility])    • Spondylolisthesis of lumbar region 1/17/2018   • Syncope, recurrent 2/8/2021     Past Surgical History:   Procedure Laterality Date   • A-V CARDIAC PACEMAKER INSERTION  2016   • ATRIAL CARDIAC PACEMAKER INSERTION     • CARDIAC CATHETERIZATION     • CATARACT EXTRACTION     • CERVICAL CORPECTOMY N/A 3/3/2021    Procedure: CERVICAL 6 CORPECTOMY WITH TITANIUM CAGE WITH NEURO MONITORING;  Surgeon: Bandar Shea MD;  Location:  [facility] OR;  Service: Neurosurgery;  Laterality: N/A;   • COLONOSCOPY  11/08/2011    One fold in the ascending colon which showed ulcer otherwise normal exam   • COLONOSCOPY  11/12/2004    Normal exam repeat in five years   • CORONARY ANGIOPLASTY WITH STENT PLACEMENT      X 2; 2013 & 2014   • ENDOSCOPY  07/10/2014    Normal exam   • FLAP LEG Right 9/14/2021    Procedure: RIGHT GLUTEAL FASCIOCUTANEOUS ADVANCEMENT FLAP AND RIGHT TENSOR FASCIAL JESSICA FLAP;  Surgeon: Amadeo Turner MD;  Location:  [facility] OR;  Service: Plastics;  Laterality: Right;   • HIP ABDUCTION TENOTOMY BILATERAL Right 1/14/2021    Procedure: RIGHT HIP GLUTEUS MEDLUS / MINIMUS REPAIR, POSSIBLE ACHILLES ALLOGRAFT;  Surgeon: Nino Carlson MD;  Location:  [facility] OR;  Service: Orthopedics;  Laterality: Right;   • INCISION AND DRAINAGE HIP Right 2/9/2021    " Procedure: HIP INCISION AND DRAINAGE;  Surgeon: Nino Carlson MD;  Location:  [facility] OR;  Service: Orthopedics;  Laterality: Right;   • INCISION AND DRAINAGE LEG Right 10/24/2021    Procedure: INCISION AND DRAINAGE LOWER EXTREMITY;  Surgeon: Amadeo Turner MD;  Location:  [facility] OR;  Service: Plastics;  Laterality: Right;   • INCISION AND DRAINAGE OF WOUND Right 7/8/2021    Procedure: INCISION AND DRAINAGE WOUND RIGHT HIP;  Surgeon: James Huntley MD;  Location:  [facility] OR;  Service: Orthopedics;  Laterality: Right;   • JOINT REPLACEMENT     • KYPHOPLASTY WITH BIOPSY Bilateral 10/26/2021    Procedure: THOARCIC 12 KYPHOPLASTY WITH BIOPSY;  Surgeon: Bandar Shea MD;  Location:  [facility] OR;  Service: Neurosurgery;  Laterality: Bilateral;   • LEG DEBRIDEMENT Right 9/14/2021    Procedure: DEBRIDEMENT OF RIGHT HIP WOUND, RIGHT GLUTEAL FASCIOCUTANEOUS ADVANCEMENT FLAP AND RIGHT TENSOR FASCIAL JESSICA FLAP;  Surgeon: Amadeo Turner MD;  Location:  [facility] OR;  Service: Plastics;  Laterality: Right;   • LUMBAR DISCECTOMY Right 3/23/2021    Procedure: LUMBAR DISCECTOMY MICRO, Lumbar 1/2 right;  Surgeon: Bandar Shea MD;  Location:  [facility] OR;  Service: Neurosurgery;  Laterality: Right;   • LUMBAR FUSION N/A 1/19/2018    Procedure: L3-4,L4-5 DECOMPRESSION, POSTERIOR SPINAL FUSION WITH INSTRUMENTATION;  Surgeon: Fortino Oropeza MD;  Location: [facility] OR;  Service:    • LUMBAR LAMINECTOMY WITH FUSION Left 1/17/2018    Procedure: LEFT L3-4 L4-5 LATERAL LUMBAR INTERBODY FUSION;  Surgeon: Fortino Oropeza MD;  Location: [facility] OR;  Service:    • MYRINGOTOMY W/ TUBES  09/04/2014    TUBES NO LONGER IN PLACE   • OTHER SURGICAL HISTORY      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 so hardware was removed and spacers were placed   • REPLACEMENT TOTAL KNEE Right      PT Assessment (last 12 hours)     PT Evaluation and Treatment     Row Name 11/05/21 1030          Physical Therapy Time and Intention    Subjective  Information complains of; pain  -KJ     Document Type therapy note (daily note)  -KJ     Mode of Treatment physical therapy  -KJ     Patient Effort fair  -KJ     Comment TLSO; was given pn meds prior to tx  -KJ     Row Name 11/05/21 1030          General Information    Existing Precautions/Restrictions fall; spinal; brace worn when out of bed  -KJ     Row Name 11/05/21 1030          Pain Scale: Numbers Pre/Post-Treatment    Pretreatment Pain Rating 8/10  -KJ     Posttreatment Pain Rating 8/10  -KJ     Pain Location knee  Right  -KJ     Row Name 11/05/21 1030          Bed Mobility    Rolling Left [place] (Bed Mobility) verbal cues; minimum assist (75% patient effort)  assisted with legs  -KJ     Rolling Right [place] (Bed Mobility) verbal cues; minimum assist (75% patient effort)  assisted with legs  -KJ     Scooting/Bridging [place] (Bed Mobility) dependent (less than 25% patient effort)  -KJ     Sidelying-Sit [place] (Bed Mobility) maximum assist (25% patient effort); 2 person assist; moderate assist (50% patient effort)  attempted sitting pt edge of bed, but as soon as we got legs off bed. She was adamanet we lay her back down due to pain.  -KJ     Assistive Device (Bed Mobility) bed rails  -KJ     Comment (Bed Mobility) refused to fully sit edge of bed due to pn in knees  -KJ     Row Name 11/05/21 1030          Motor Skills    Therapeutic Exercise aerobic  -KJ     Row Name 11/05/21 1030          Aerobic Exercise    Comment, Aerobic Exercise (Therapeutic Exercise) PROM BLE's  -KJ     Row Name 11/05/21 1030          Positioning and Restraints    Pre-Treatment Position in bed  -KJ     Post Treatment Position bed  -KJ     In Bed call light within reach; fowlers; exit alarm on  -KJ           User Key  (r) = Recorded By, (t) = Taken By, (c) = Cosigned By    Initials Name Provider Type    Emilee Gloria PTA Physical Therapy Assistant              Physical Therapy Education                 Title:  PT OT SLP Therapies (In Progress)     Topic: Physical Therapy (In Progress)     Point: Mobility training (Done)     Learning Progress Summary           Patient Acceptance, E, VU,NR by  at 11/4/2021 1509    Comment: Educated pt on spinal precautions, proper body mechanics during bed mobility transfers, POC, and d/c.                   Point: Home exercise program (Not Started)     Learner Progress:  Not documented in this visit.          Point: Body mechanics (Done)     Learning Progress Summary           Patient Acceptance, E, VU,NR by  at 11/4/2021 1509    Comment: Educated pt on spinal precautions, proper body mechanics during bed mobility transfers, POC, and d/c.                   Point: Precautions (Done)     Learning Progress Summary           Patient Acceptance, E, VU,NR by  at 11/4/2021 1509    Comment: Educated pt on spinal precautions, proper body mechanics during bed mobility transfers, POC, and d/c.                               User Key     Initials Effective Dates Name Provider Type Discipline     09/07/21 -  Juliana Angela, PT Student PT Student PT              PT Recommendation and Plan     Plan of Care Reviewed With: patient  Progress: no change  Outcome Summary: PT tx completed. Pt given pn meds prior to tx. C/O pn both knees. Rolling L<>R Beata. In sidelying once legs assisted off bed she was adament we put her legs back up into bed due to pain. Reports an increased in R knee pn that was intolerable. PROM was performed to BLE's. Decreased sensation LLE. Recommend SNF for con'td PT/OT       Time Calculation:    PT Charges     Row Name 11/05/21 1100             Time Calculation    Start Time 1030  -KJ      Stop Time 1053  -KJ      Time Calculation (min) 23 min  -KJ      PT Received On 11/05/21  -KJ      PT Goal Re-Cert Due Date 11/14/21  -KJ              Time Calculation- PT    Total Timed Code Minutes- PT 23 minute(s)  -KJ            User Key  (r) = Recorded By, (t) = Taken By, (c) =  Cosigned By    Initials Name Provider Type    Emilee Gloria PTA Physical Therapy Assistant              Therapy Charges for Today     Code Description Service Date Service Provider Modifiers Qty    66386672882 HC PT THER PROC EA 15 MIN 11/5/2021 Emilee Paul, SERENA GP 1    10898210606 HC PT THERAPEUTIC ACT EA 15 MIN 11/5/2021 Emilee Paul PTA GP 1          PT G-Codes  Outcome Measure Options: AM-PAC 6 Clicks Basic Mobility (PT)  AM-PAC 6 Clicks Score (PT): 10  AM-PAC 6 Clicks Score (OT): 13    Emilee Paul PTA  11/5/2021

## 2021-11-05 NOTE — PLAN OF CARE
Goal Outcome Evaluation:  Plan of Care Reviewed With: patient        Progress: declining  Outcome Summary: A&Ox4. C/o pain. Prn pain medication given with some relief. Back edema near previous incision sites remains. BLE extremely weak, patient can slightly wiggle toes but unable to tolerate any other movement of BLE. Reports n/t in BLE. Lidocaine patch in place to bilat thighs. Right hip drsg CDI. Drsg changed per orders. F/c maintained. E PICC drsg CDI. Patient very hesitant to turn, refusing turns majority of shift due to pain. Unable to tolerated bed higher than 45 degrees. PPP. Pt was able to get a few hours of rest this shift.  Call light within reach. Safety maintained.

## 2021-11-05 NOTE — PROGRESS NOTES
NEUROSURGERY DAILY PROGRESS NOTE    ASSESSMENT:   Tawny Shin is a 66 y.o. with a significant medical history of prior L3-5 posterior lumbar fusion (Dr. Oropeza 2018), MSSA osteomyelitis of right femur (7/6/2021), C6 corpectomy (3/3/2021), L1-2 osteomyelitis/discitis requiring L1 microdiscectomy (3/23/2021), and T12 kyphoplasty (10/26/2021), rheumatoid arthritis on disease modifying agents, osteoporosis, Sjogren's disease, MI, CAD with stenting, DVT right lower extremity currently on Xarelto and ASA (as of 11/4/2021), hypertension, COPD, hyperlipidemia, hypothyroidism, and she is overweight.  She presents from rehab with L1-3 numbness and pain and worsening proximal leg weakness over last 4-5 days.  Physical exam findings of bright and awake, oriented x3, chronic bilateral  strength weakness, worsening left and right IP (1/5) and quadricep weakness (3/5), BLE (4-/5), gross hyperreflexia and bilateral Babinski's.  Her imaging shows anterior epidural hematoma associated with her T12 compression fracture.      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   • Elevated cholesterol    • Eustachian tube dysfunction    • Heart disease    • Herpes simplex    • History of transfusion    • Hyperlipidemia    • Hypertension    • Hypothyroidism 12/23/2020   • Intrinsic asthma 12/23/2020   • Knee dislocation    • Labral tear of right hip joint    • Laryngitis sicca    • Laryngitis,  "chronic    • Left carotid bruit 3/9/2016   • MI (myocardial infarction) ([facility])    • Myalgia due to statin 6/25/2019   • Open wound of right hip 9/14/2021   • Osteomyelitis of right femur ([facility]) 7/6/2021   • Otorrhea    • Pacemaker 11/17/2016   • Primary osteoarthritis of left knee 12/23/2020   • Psoriasis vulgaris 12/23/2020   • S/P coronary artery stent placement 3/9/2016   • Sensorineural hearing loss    • Seropositive rheumatoid arthritis of multiple sites ([facility]) 12/27/2019    Overview:  -myochrysine '93-'96 -methotrexate '96--->11/98;r/s  restarted 2/99--> 8/14 (anemia) -sulfasalazine- not effective -penicillamine 6/98-->10/98; no effect -leflunomide 11/98--> - Humira '13-->didn't take - Enbrel 12/14-->3/15- no effect!   Last Assessment & Plan:  - \"aching all over\" because she had to be off her anti-rheumatic drugs for 2 weeks in preparation for her R knee surgery - he   • Sick sinus syndrome ([facility]) 12/27/2019   • Sjogren's disease ([facility])    • Spondylolisthesis of lumbar region 1/17/2018   • Syncope, recurrent 2/8/2021     Active Hospital Problems    Diagnosis    • Epidural hematoma ([facility])      HPI: Appears to be resting comfortably.  Complains of bilateral hip pain, right > left.  No acute events overnight.    PLAN:   Neuro: Exam unchanged, bilateral lower extremity weakness   Continue neurochecks every 4 hours  CV: No acute issues.  VS WNL  Pulm: No acute issues.  Maintaining O2 sat.  : Indwelling Horan catheter.  FEN: Tolerating regular diet.  .  Repeat CMP and prealbumin pending  GI: No acute issues.  ORTHO: Abnormal MRI from [facility] on 11/3/2021; rim-enhancing fluid collection posterior to the proximal right femur that extends into the screw track; smaller area of fluid collection adjacent to the larger fluid collection; synovial enhancement of the right hip joint space.   Plastics consulted.  Appreciate assistance  ID: WBC normal.  Continue Ancef.  Repeat CBC pending  Heme:  DVT prophylaxis with " "SCDs.  Hold Xarelto and aspirin for now.  H/H 7.3/23.7  Pain: Waxes and wanes.  Dispo: PT/OT    CHIEF COMPLAINT:   L1-3 numbness and pain and worsening proximal leg weakness over last 4-5 days    Subjective  Stable.  Doing well    Temp:  [97.3 °F (36.3 °C)-98.4 °F (36.9 °C)] 98.3 °F (36.8 °C)  Heart Rate:  [60-78] 75  Resp:  [16-18] 18  BP: ()/(41-62) 126/53    Objective:  General Appearance:  In no acute distress.    Vital signs: (most recent): Blood pressure 126/53, pulse 75, temperature 98.3 °F (36.8 °C), temperature source Oral, resp. rate 18, height 167.6 cm (66\"), weight 71.6 kg (157 lb 14.4 oz), SpO2 96 %, not currently breastfeeding.        Neurologic Exam     Mental Status   Oriented to person, place, and time.   Attention: normal. Concentration: normal.   Speech: speech is normal   Level of consciousness: alert    Bright and awake.  Oriented x3.  Follows commands without prompting.     Cranial Nerves     CN II   Visual fields full to confrontation.     CN III, IV, VI   Pupils are equal, round, and reactive to light.  Extraocular motions are normal.     CN V   Facial sensation intact.     CN VII   Facial expression full, symmetric.     CN VIII   CN VIII normal.     CN IX, X   CN IX normal.     CN XI   CN XI normal.     Motor Exam   Right arm tone: normal  Left arm tone: normal  Right leg tone: normal  Left leg tone: normal    Strength   Right deltoid: 5/5  Left deltoid: 5/5  Right biceps: 5/5  Left biceps: 5/5  Right triceps: 4/5  Left triceps: 4/5  Right wrist extension: 5/5  Left wrist extension: 5/5  Right interossei: 4/5  Left interossei: 4/5  Right iliopsoas: 1/5  Left iliopsoas: 2/5  Right quadriceps: 1/5  Left quadriceps: 2/5  Right anterior tibial: 2/5  Left anterior tibial: 4/5  Right gastroc: 2/5  Left gastroc: 4/5       Sensory Exam   Right arm light touch: normal  Left arm light touch: normal  Right leg light touch: decreased from knee  Left leg light touch: decreased from knee  Sensory " deficit distribution on right: L1  Sensory deficit distribution on left: L1    Gait, Coordination, and Reflexes     Tremor   Resting tremor: absent  Intention tremor: absent  Action tremor: absent    Drains:   Urethral Catheter (Active)   Daily Indications Chronic Urinary Retention related to Obstructive, Infectious/Inflammatory, Neurologic or Traumatic Causes 11/04/21 2111   Site Assessment Clean; Skin intact 11/04/21 2111   Collection Container Standard drainage bag 11/04/21 2111   Securement Method Securing device 11/04/21 2111   Catheter care complete Yes 11/05/21 0600   Output (mL) 450 mL 11/05/21 0600       [REMOVED] Closed/Suction Drain Right;Anterior Thigh Bulb 15 Fr. (Removed)       [REMOVED] Urethral Catheter Silicone 16 Fr. (Removed)       [REMOVED] External Urinary Catheter (Removed)       [REMOVED] External Urinary Catheter (Removed)   Site Assessment Clean 10/24/21 0919   Application/Removal external catheter applied 10/24/21 0919   Collection Container Wall suction 10/24/21 0919   Securement Method Securing device 10/24/21 0919   Output (mL) 350 mL 10/24/21 1841       [REMOVED] External Urinary Catheter (Removed)   Site Assessment Clean; Skin intact 10/27/21 2013   Application/Removal external catheter changed 10/28/21 1025   Collection Container Wall suction 10/28/21 1025   Wall suction (mmHG) 80 mmHG 10/28/21 1025   Securement Method Securing device 10/28/21 1025   Catheter care complete Yes 10/27/21 0815   Output (mL) 400 mL 10/28/21 0300     Imaging Results (Last 24 Hours)     Procedure Component Value Units Date/Time    CT Lumbar Spine Without Contrast [[id number]] Collected: 11/04/21 0912     Updated: 11/04/21 0941    Narrative:      EXAMINATION: CT LUMBAR SPINE WO CONTRAST-      11/4/2021 8:34 AM CDT     HISTORY: Chronic back pain. Previous surgery and fractures.     In order to have a CT radiation dose as low as reasonably achievable  Automated Exposure Control was utilized for adjustment of  the mA and/or  KV according to patient size.     DLP in mGycm= 478.     Axial, sagittal, and coronal noncontrast lumbar spine CT.     Severe diffuse demineralization.  Intact sacrum and SI joints.  No scoliosis.     L3-4 and L4-5 discectomy.  Bilateral pedicle screw hardware fusion at L3, L4, and L5.  Hardware is intact.     High-grade disc space narrowing with endplate spurring and degenerative  endplate sclerosis at L1-2.     Chronic T12 compression fracture with kyphoplasty treatment.  Approximately 40% loss of height.     Lordotic curvature is appropriate.  No significant malalignment.     Right laminectomy defect at L2.  Posterior element resection at L4.     Intact sacrum.  Symmetric SI joints.     Aortic calcification with no aneurysm.     No acute lumbar spine fracture is seen.     Evidence of disc bulge with spinal canal stenosis at L1-2.  Decompression based on right laminectomy defect.     Disc protrusion, facet disease, and endplate spurring with mild  foraminal narrowing at L2-3.     L3-4 discectomy level with streak artifact.   Appearance of moderate right foraminal stenosis based on spurring.     L4-5 discectomy level with streak artifact.  No significant stenosis is seen.     L5-S1 shows facet hypertrophy. Mild relative foraminal narrowing on the  right.     Summary:  1. Extensive postsurgical and degenerative change.  2. Severe demineralization.  3. No acute fracture is seen.                                   This report was finalized on 11/04/2021 09:37 by Dr. Jesus Manuel Santos MD.        Lab Results (last 24 hours)     Procedure Component Value Units Date/Time    Plt Funct Epinephrine [[id number]] Collected: 11/04/21 0711    Specimen: Blood Updated: 11/04/21 0904     Platelet Function EPI --     Comment: Testing performed at [facility].  See attached scanned report.         Date of Last Dose --     Comment: Not given        Plt Therapy Drug --     Comment: Not Given           [id number]  Taiwo Prado,  APRN

## 2021-11-05 NOTE — PLAN OF CARE
Problem: Adult Inpatient Plan of Care  Goal: Plan of Care Review  Outcome: Ongoing, Progressing  Flowsheets (Taken 11/5/2021 2983)  Plan of Care Reviewed With: patient  Outcome Summary: Pt is A&Ox4. Pt has slept most of the night. drowsy at times. Dressing to right hip. BLE still weak. . Horan in place. No neuro changes noted. VSS. Safety maintained.

## 2021-11-05 NOTE — THERAPY EVALUATION
Acute Care - Speech Language Pathology   Swallow Initial Evaluation [facility]     Patient Name: Tawny Shin  : 1955  MRN: [id number]  Today's Date: 2021               Admit Date: 2021     Clinical bedside swallowing evaluation completed per SLP. Pt presented with flat affect, delayed responses, had difficulty verbalizing wants and needs at times, and c/o quite significant L leg pain. Repositioned with pillow under L knee, pt reported improvement. RN, Clarissa, arrived and presented meds crushed in applesauce. She was then presented a full range of consistencies except mechanical soft, provided per SLP. [facility] generalized weakness, as pt presents with overall generalized weakness and deconditioning. She reported poor intake at home as of late, stated that she has lost weight but is uncertain of the amount, and that she has only been able to consume up to one full sized meal amount of PO intake per day over the past two weeks. Due to significant pain, pt was unable to tolerate HOB elevation beyond approximately 30 degrees, which RN reported has been a barrier at time of PO intake since admit. RN reported coughing and wet vocal quality observed with thin liquids this AM. With PO trials at time of SLP eval, pt was noted to have decreased labial seal on the utensil across multiple trials, as well as periods of oral holding of the bolus, which resulted in delayed oral transit. She also appeared to have minimal oral manipulation with all liquid trials, as well as up to three piecemeal swallows with each trial. She exhibited significantly reduced mandibular strength and ROM to accept and masticate regular solids, despite cues to increase bolus size, with prolonged, yet weak oral manipulation. No overt s/s of aspiration were observed, however, pt also stated she has had some coughing with thin liquids at home for at least six months, and now occurs as frequently at 1x/day. She was  educated that given this report and nursing concerns of coughing with thin liquids at times, she is felt to be at a heightened risk for aspiration with thin liquids at this time, especially considering deconditioned state, impaired cognitive function, and positioning limitations during PO due to pain. She was educated that nectar thick liquids may, indeed, be less likely to be aspirated at this time, however, SLP is concerned that this would risk a further decline in overall nutritional intake, which is already significantly limited at this time. She reported she did not eat breakfast this AM. Pt declined option for thickened liquids at this time, knowing the believed risk for aspiration with thin liquids. Educated RN on discussion and plan. RECOMMENDATIONS: Change to mechanical soft diet consistency, continue with regular thin liquid consistency; 1:1 feeds with staff or family, HOB to be elevated as high at pt can tolerate (given pain) with all PO; Meds crushed (if safe to be crushed) in pudding/applesauce; RN to monitor for increased lung congestion, notify SLP if new lung concerns arise; Dietary to add cream based soup to each lunch and dinner tray, as SLP feels this specific food item would allow for easier intake of more consistent PO; Magic cup for nutritional support if able to be provided given consistent carb diet restriction. ST to continue to follow and tx for dysphagia. Thanks!   Nelli Jackson, CCC-SLP 11/5/2021 13:38 CDT    Visit Dx:     ICD-10-CM ICD-9-CM   1. Impaired mobility  Z74.09 799.89   2. Impaired mobility and ADLs  Z74.09 V49.89    Z78.9    3. Dysphagia, unspecified type  R13.10 787.20     Patient Active Problem List   Diagnosis   • T12 compression fracture, initial encounter ([facility])   • Chronic embolism and thrombosis of unspecified deep veins of left lower extremity (HCC)   • Urinary tract infection without hematuria   • Acute bilateral thoracic back pain   • Chronic anticoagulation   •  "Hypokalemia   • Transaminitis   • Iron deficiency anemia   • Osteoporosis   • Bacteremia   • Moderate malnutrition (CMS/[facility])   • Epidural hematoma ([facility])     Past Medical History:   Diagnosis Date   • Age-related osteoporosis with current pathological fracture 5/27/2020   • Arthritis    • Asthma    • Bilateral bunions 12/23/2020   • Cancer ([facility])    • Cardiac pacemaker syndrome 12/23/2020    Overview:  - heart block - implanted 11/16   • Charcot's joint of foot, left 12/23/2020   • Chronic deep vein thrombosis (DVT) of right lower extremity ([facility]) 6/23/2021   • Chronic pain syndrome 6/22/2021   • Chronic sinusitis    • COPD (chronic obstructive pulmonary disease) ([facility])    • Coronary artery disease    • 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   • Elevated cholesterol    • Eustachian tube dysfunction    • Heart disease    • Herpes simplex    • History of transfusion    • Hyperlipidemia    • Hypertension    • Hypothyroidism 12/23/2020   • Intrinsic asthma 12/23/2020   • Knee dislocation    • Labral tear of right hip joint    • Laryngitis sicca    • Laryngitis, chronic    • Left carotid bruit 3/9/2016   • MI (myocardial infarction) ([facility])    • Myalgia due to statin 6/25/2019   • Open wound of right hip 9/14/2021   • Osteomyelitis of right femur ([facility]) 7/6/2021   • Otorrhea    • Pacemaker 11/17/2016   • Primary osteoarthritis of left knee 12/23/2020   • Psoriasis vulgaris 12/23/2020   • S/P coronary artery stent placement 3/9/2016   • Sensorineural hearing loss    • Seropositive rheumatoid arthritis of multiple sites ([facility]) 12/27/2019    Overview:  -myochrysine '93-'96 -methotrexate '96--->11/98;r/s  restarted 2/99--> 8/14 (anemia) -sulfasalazine- not effective -penicillamine 6/98-->10/98; no effect -leflunomide 11/98--> - Humira '13-->didn't take - Enbrel 12/14-->3/15- no effect!   Last Assessment & Plan:  - \"aching all over\" because she had to be off her anti-rheumatic drugs for 2 weeks in preparation for her R knee " surgery - he   • Sick sinus syndrome (HCC) 12/27/2019   • Sjogren's disease (HCC)    • Spondylolisthesis of lumbar region 1/17/2018   • Syncope, recurrent 2/8/2021     Past Surgical History:   Procedure Laterality Date   • A-V CARDIAC PACEMAKER INSERTION  2016   • ATRIAL CARDIAC PACEMAKER INSERTION     • CARDIAC CATHETERIZATION     • CATARACT EXTRACTION     • CERVICAL CORPECTOMY N/A 3/3/2021    Procedure: CERVICAL 6 CORPECTOMY WITH TITANIUM CAGE WITH NEURO MONITORING;  Surgeon: Bandar Shea MD;  Location:  [facility] OR;  Service: Neurosurgery;  Laterality: N/A;   • COLONOSCOPY  11/08/2011    One fold in the ascending colon which showed ulcer otherwise normal exam   • COLONOSCOPY  11/12/2004    Normal exam repeat in five years   • CORONARY ANGIOPLASTY WITH STENT PLACEMENT      X 2; 2013 & 2014   • ENDOSCOPY  07/10/2014    Normal exam   • FLAP LEG Right 9/14/2021    Procedure: RIGHT GLUTEAL FASCIOCUTANEOUS ADVANCEMENT FLAP AND RIGHT TENSOR FASCIAL JESSICA FLAP;  Surgeon: Amadeo Turner MD;  Location:  [facility] OR;  Service: Plastics;  Laterality: Right;   • HIP ABDUCTION TENOTOMY BILATERAL Right 1/14/2021    Procedure: RIGHT HIP GLUTEUS MEDLUS / MINIMUS REPAIR, POSSIBLE ACHILLES ALLOGRAFT;  Surgeon: Nino Carlson MD;  Location:  [facility] OR;  Service: Orthopedics;  Laterality: Right;   • INCISION AND DRAINAGE HIP Right 2/9/2021    Procedure: HIP INCISION AND DRAINAGE;  Surgeon: Nino Carlson MD;  Location:  [facility] OR;  Service: Orthopedics;  Laterality: Right;   • INCISION AND DRAINAGE LEG Right 10/24/2021    Procedure: INCISION AND DRAINAGE LOWER EXTREMITY;  Surgeon: Amadeo Turner MD;  Location:  [facility] OR;  Service: Plastics;  Laterality: Right;   • INCISION AND DRAINAGE OF WOUND Right 7/8/2021    Procedure: INCISION AND DRAINAGE WOUND RIGHT HIP;  Surgeon: James Huntley MD;  Location:  [facility] OR;  Service: Orthopedics;  Laterality: Right;   • JOINT REPLACEMENT     • KYPHOPLASTY WITH BIOPSY Bilateral  10/26/2021    Procedure: THOARCIC 12 KYPHOPLASTY WITH BIOPSY;  Surgeon: Bandar Shea MD;  Location:  [facility] OR;  Service: Neurosurgery;  Laterality: Bilateral;   • LEG DEBRIDEMENT Right 9/14/2021    Procedure: DEBRIDEMENT OF RIGHT HIP WOUND, RIGHT GLUTEAL FASCIOCUTANEOUS ADVANCEMENT FLAP AND RIGHT TENSOR FASCIAL JESSICA FLAP;  Surgeon: Amadeo Turner MD;  Location:  [facility] OR;  Service: Plastics;  Laterality: Right;   • LUMBAR DISCECTOMY Right 3/23/2021    Procedure: LUMBAR DISCECTOMY MICRO, Lumbar 1/2 right;  Surgeon: Bandar Shea MD;  Location:  [facility] OR;  Service: Neurosurgery;  Laterality: Right;   • LUMBAR FUSION N/A 1/19/2018    Procedure: L3-4,L4-5 DECOMPRESSION, POSTERIOR SPINAL FUSION WITH INSTRUMENTATION;  Surgeon: Fortino Oropeza MD;  Location:  [facility] OR;  Service:    • LUMBAR LAMINECTOMY WITH FUSION Left 1/17/2018    Procedure: LEFT L3-4 L4-5 LATERAL LUMBAR INTERBODY FUSION;  Surgeon: Fortino Oropeza MD;  Location: [facility] OR;  Service:    • MYRINGOTOMY W/ TUBES  09/04/2014    TUBES NO LONGER IN PLACE   • OTHER SURGICAL HISTORY      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 so hardware was removed and spacers were placed   • REPLACEMENT TOTAL KNEE Right        SLP Recommendation and Plan  SLP Swallowing Diagnosis: mod-severe, oral dysphagia, suspected pharyngeal dysphagia, esophageal dysphagia (11/05/21 0955)  SLP Diet Recommendation: soft textures, thin liquids (11/05/21 0955)  Recommended Precautions and Strategies: upright posture during/after eating, small bites of food and sips of liquid, other (see comments) (Cue as needed to reduce oral holding) (11/05/21 0955)  SLP Rec. for Method of Medication Administration: meds crushed, with pudding or applesauce (11/05/21 0955)     Monitor for Signs of Aspiration: yes, notify SLP if any concerns, gurgly voice, pneumonia, right lower lobe infiltrates (11/05/21 0955)     Swallow Criteria for Skilled Therapeutic Interventions Met: demonstrates  skilled criteria (11/05/21 0955)  Anticipated Discharge Disposition (SLP): unknown (11/05/21 0955)  Rehab Potential/Prognosis, Swallowing: adequate, monitor progress closely (11/05/21 0955)  Therapy Frequency (Swallow): at least, 3 days per week (11/05/21 0955)  Predicted Duration Therapy Intervention (Days): until discharge (11/05/21 0955)                         Plan of Care Reviewed With: patient, other (see comments) (Clarissa SMITH. Reinforcements needed for pt.)  Progress:  (Eval)  Outcome Summary: Clinical bedside swallowing evaluation completed per SLP. Pt presented with flat affect, delayed responses, had difficulty verbalizing wants and needs at times, and c/o quite significant L leg pain. Repositioned with pillow under L knee, pt reported improvement. Clarissa SMITH, arrived and presented meds crushed in applesauce. She was then presented a full range of consistencies except mechanical soft, provided per SLP. [facility], as pt presents with overall generalized weakness and deconditioning. She reported poor intake at home as of late, stated that she has lost weight but is uncertain of the amount, and that she has only been able to consume up to one full sized meal amount of PO intake per day over the past two weeks. Due to significant pain, pt was unable to tolerate HOB elevation beyond approximately 30 degrees, which RN reported has been a barrier at time of PO intake since admit. RN reported coughing and wet vocal quality observed with thin liquids this AM. With PO trials at time of SLP eval, pt was noted to have decreased labial seal on the utensil across multiple trials, as well as periods of oral holding of the bolus, which resulted in delayed oral transit. She also appeared to have minimal oral manipulation with all liquid trials, as well as up to three piecemeal swallows with each trial. She exhibited significantly reduced mandibular strength and ROM to accept and  masticate regular solids, despite cues to increase bolus size, with prolonged, yet weak oral manipulation. No overt s/s of aspiration were observed, however, pt also stated she has had some coughing with thin liquids at home for at least six months, and now occurs as frequently at 1x/day. She was educated that given this report and nursing concerns of coughing with thin liquids at times, she is felt to be at a heightened risk for aspiration with thin liquids at this time, especially considering deconditioned state, impaired cognitive function, and positioning limitations during PO due to pain. She was educated that nectar thick liquids may, indeed, be less likely to be aspirated at this time, however, SLP is concerned that this would risk a further decline in overall nutritional intake, which is already significantly limited at this time. She reported she did not eat breakfast this AM. Pt declined option for thickened liquids at this time, knowing the believed risk for aspiration with thin liquids. Educated RN on discussion and plan.      SWALLOW EVALUATION (last 72 hours)     SLP Adult Swallow Evaluation     Row Name 11/05/21 0955                   Rehab Evaluation    Document Type evaluation  -TM        Subjective Information complains of; pain  -TM        Patient Observations alert; cooperative  Delayed responses, flat affect  -TM        Patient/Family/Caregiver Comments/Observations No family present.  -TM        Care Plan Review evaluation/treatment results reviewed; care plan/treatment goals reviewed; risks/benefits reviewed; current/potential barriers reviewed; patient/other agree to care plan  Clarissa SMITH  -        Patient Effort adequate  -TM                  General Information    Patient Profile Reviewed yes  -TM        Pertinent History Of Current Problem Hx of L3-5 fusion in 2018, MSSA osteomyelitis of R femur, C-6 corpectomy 03/03/21, T12 kyphoplasty, RA, osteoporosis, Sjogren's disease, MI, CAD with  stent, DVT, HTN, COPD, hyperlipidemia.  -TM        Current Method of Nutrition regular textures; thin liquids  -TM        Precautions/Limitations, Vision WFL; for purposes of eval  -TM        Precautions/Limitations, Hearing WFL; for purposes of eval  -TM        Prior Level of Function-Communication unknown  -TM        Prior Level of Function-Swallowing dietary restrictions (e.g. consistent carb, low salt, etc.); other (see comments)  Consistent carbs  -TM        Plans/Goals Discussed with patient; other (see comments)  RNClarissa  -TM        Barriers to Rehab cognitive status; medically complex  -TM        Patient's Goals for Discharge patient did not state  -TM                  Pain    Additional Documentation Pain Scale: FACES Pre/Post-Treatment (Group)  -TM                  Pain Scale: Numbers Pre/Post-Treatment    Pretreatment Pain Rating 8/10  -TM        Posttreatment Pain Rating 8/10  -TM        Pain Location - Side Left  -TM        Pain Location hip; knee  -TM        Pain Intervention(s) Repositioned  -TM                  Oral Motor Structure and Function    Dentition Assessment natural, present and adequate  -TM        Secretion Management WNL/WFL  -TM        Mucosal Quality moist, healthy  -TM        Volitional Swallow delayed  -TM        Volitional Cough weak  -TM                  Oral Musculature and Cranial Nerve Assessment    Oral Motor General Assessment generalized oral motor weakness  -TM                  General Eating/Swallowing Observations    Respiratory Support Currently in Use room air  -TM        Eating/Swallowing Skills fed by SLP  -TM        Positioning During Eating reclined; other (see comments)  Unable to tolerate further HOB elevation due to pain  -TM        Utensils Used spoon; straw  -TM        Consistencies Trialed pudding thick; honey-thick liquids; nectar/syrup-thick liquids; thin liquids; regular textures  -TM                  Respiratory    Respiratory Status WFL  -TM                   Clinical Swallow Eval    Oral Prep Phase impaired  -TM        Oral Transit impaired  -TM        Oral Residue WFL  -TM        Pharyngeal Phase suspected pharyngeal impairment  -TM        Esophageal Phase suspected esophageal impairment  -TM        Clinical Swallow Evaluation Summary See note.  -TM                  Oral Prep Concerns    Oral Prep Concerns oral holding; incomplete or weak lip closure around spoon; poor rotary chew; inefficient mastication; prolonged mastication  -TM        Oral Holding pudding; honey; nectar; thin  -TM        Prolonged Mastication regular consistencies  -TM        Inefficient Mastication regular consistencies  -TM        Poor Rotary Chew regular consistencies  -TM        Incomplete or Weak Lip Closure Around Spoon pudding; honey  -TM                  Oral Transit Concerns    Oral Transit Concerns delayed initiation of bolus transit  -TM        Delayed Intiation of Bolus Transit pudding; honey; nectar; thin; regular consistencies  -TM        Oral Transit Concerns, Comment Reported coughing with thin  -TM                  Pharyngeal Phase Concerns    Pharyngeal Phase Concerns other (see comments)  -TM                  Esophageal Phase Concerns    Esophageal Phase Concerns belching  -TM        Belching nectar; thin  -TM                  Clinical Impression    SLP Swallowing Diagnosis mod-severe; oral dysphagia; suspected pharyngeal dysphagia; esophageal dysphagia  -TM        Functional Impact risk of aspiration/pneumonia; risk of malnutrition; risk of dehydration  -        Rehab Potential/Prognosis, Swallowing adequate, monitor progress closely  -        Swallow Criteria for Skilled Therapeutic Interventions Met demonstrates skilled criteria  -                  Recommendations    Therapy Frequency (Swallow) at least; 3 days per week  -TM        Predicted Duration Therapy Intervention (Days) until discharge  -        SLP Diet Recommendation soft textures; thin liquids  -         Recommended Precautions and Strategies upright posture during/after eating; small bites of food and sips of liquid; other (see comments)  Cue as needed to reduce oral holding  -TM        Oral Care Recommendations Oral Care BID/PRN  -TM        SLP Rec. for Method of Medication Administration meds crushed; with pudding or applesauce  -TM        Monitor for Signs of Aspiration yes; notify SLP if any concerns; gurgly voice; pneumonia; right lower lobe infiltrates  -TM        Anticipated Discharge Disposition (SLP) unknown  -TM                  Swallow Goals (SLP)    Oral Nutrition/Hydration Goal Selection (SLP) oral nutrition/hydration, SLP goal 1  -TM        Labial Strengthening Goal Selection (SLP) labial strengthening, SLP goal 1  -TM        Lingual Strengthening Goal Selection (SLP) lingual strengthening, SLP goal 1  -TM        Additional Documentation labial strengthening goal selection (SLP); lingual strengthening goal selection (SLP)  -TM                  Oral Nutrition/Hydration Goal 1 (SLP)    Oral Nutrition/Hydration Goal 1, SLP Pt will tolerate LRD without overt s/s of aspiration.  -TM        Time Frame (Oral Nutrition/Hydration Goal 1, SLP) by discharge  -TM        Barriers (Oral Nutrition/Hydration Goal 1, SLP) Cognition  -TM        Progress/Outcomes (Oral Nutrition/Hydration Goal 1, SLP) goal ongoing  -TM                  Labial Strengthening Goal 1 (SLP)    Activity (Labial Strengthening Goal 1, SLP) increase labial tone  -TM        Increase Labial Tone labial resistance exercises  -TM        [place]/Accuracy (Labial Strengthening Goal 1, SLP) independently (over 90% accuracy)  -TM        Time Frame (Labial Strengthening Goal 1, SLP) by discharge  -TM        Barriers (Labial Strengthening Goal 1, SLP) Cognition  -TM        Progress/Outcomes (Labial Strengthening Goal 1, SLP) goal ongoing  -TM                  Lingual Strengthening Goal 1 (SLP)    Activity (Lingual Strengthening Goal 1, SLP)  increase lingual tone/sensation/control/coordination/movement  -TM        Increase Lingual Tone/Sensation/Control/Coordination/Movement lingual movement exercises  -TM        [place]/Accuracy (Lingual Strengthening Goal 1, SLP) independently (over 90% accuracy)  -TM        Time Frame (Lingual Strengthening Goal 1, SLP) by discharge  -TM        Barriers (Lingual Strengthening Goal 1, SLP) Cognition  -TM        Progress/Outcomes (Lingual Strengthening Goal 1, SLP) goal ongoing  -TM              User Key  (r) = Recorded By, (t) = Taken By, (c) = Cosigned By    Initials Name Effective Dates    TM Nelli Jackson CCC-SLP 06/16/21 -                 EDUCATION  The patient has been educated in the following areas:   Dysphagia (Swallowing Impairment).        SLP GOALS     Row Name 11/05/21 0955             Oral Nutrition/Hydration Goal 1 (SLP)    Oral Nutrition/Hydration Goal 1, SLP Pt will tolerate LRD without overt s/s of aspiration.  -TM      Time Frame (Oral Nutrition/Hydration Goal 1, SLP) by discharge  -TM      Barriers (Oral Nutrition/Hydration Goal 1, SLP) Cognition  -TM      Progress/Outcomes (Oral Nutrition/Hydration Goal 1, SLP) goal ongoing  -TM              Labial Strengthening Goal 1 (SLP)    Activity (Labial Strengthening Goal 1, SLP) increase labial tone  -TM      Increase Labial Tone labial resistance exercises  -TM      [place]/Accuracy (Labial Strengthening Goal 1, SLP) independently (over 90% accuracy)  -TM      Time Frame (Labial Strengthening Goal 1, SLP) by discharge  -TM      Barriers (Labial Strengthening Goal 1, SLP) Cognition  -TM      Progress/Outcomes (Labial Strengthening Goal 1, SLP) goal ongoing  -TM              Lingual Strengthening Goal 1 (SLP)    Activity (Lingual Strengthening Goal 1, SLP) increase lingual tone/sensation/control/coordination/movement  -TM      Increase Lingual Tone/Sensation/Control/Coordination/Movement lingual movement exercises  -TM       [place]/Accuracy (Lingual Strengthening Goal 1, SLP) independently (over 90% accuracy)  -TM      Time Frame (Lingual Strengthening Goal 1, SLP) by discharge  -TM      Barriers (Lingual Strengthening Goal 1, SLP) Cognition  -TM      Progress/Outcomes (Lingual Strengthening Goal 1, SLP) goal ongoing  -TM            User Key  (r) = Recorded By, (t) = Taken By, (c) = Cosigned By    Initials Name Provider Type     Nelli Jackson CCC-SLP Speech and Language Pathologist                   Time Calculation:    Time Calculation- SLP     Row Name 11/05/21 1336             Time Calculation- SLP    SLP Start Time 0955  -TM      SLP Stop Time 1116  -TM      SLP Time Calculation (min) 81 min  -TM      SLP Received On 11/05/21  -TM      SLP Goal Re-Cert Due Date 11/15/21  -TM              Untimed Charges    SLP Eval/Re-eval  ST Eval Oral Pharyng Swallow - [id number]  -TM      [id number] Eval Oral Pharyng Swallow Minutes 81  -TM              Total Minutes    Untimed Charges Total Minutes 81  -TM       Total Minutes 81  -TM            User Key  (r) = Recorded By, (t) = Taken By, (c) = Cosigned By    Initials Name Provider Type     Nelli Jackson CCC-SLP Speech and Language Pathologist                Therapy Charges for Today     Code Description Service Date Service Provider Modifiers Qty    [id number]  ST EVAL ORAL PHARYNG SWALLOW 5 11/5/2021 Nelli Jackson CCC-SLP GN 1               KEVAN Rueda  11/5/2021

## 2021-11-05 NOTE — PLAN OF CARE
Goal Outcome Evaluation:  Plan of Care Reviewed With: patient        Progress: no change  Outcome Summary: OT tx complete. Pt fowlers in bed - rf OOB or to sit EOB. Aggreable to BUE exercises. She completes BUE strengthening exercises in all planes and joints 10 reps x 2 sets. She req multiple RB during sets of 10 and was c/o of pain in LE during BUE exercises. She fell asleep x2 during tx and needed additional cues for task completion. Cont OT POC to cont to increase her strength and independence needed for ADL's. Recommend dc to SNF. Continue OT POC

## 2021-11-06 PROCEDURE — 99231 SBSQ HOSP IP/OBS SF/LOW 25: CPT | Performed by: NEUROLOGICAL SURGERY

## 2021-11-06 PROCEDURE — 97530 THERAPEUTIC ACTIVITIES: CPT

## 2021-11-06 PROCEDURE — 97110 THERAPEUTIC EXERCISES: CPT

## 2021-11-06 PROCEDURE — 25010000002 CEFAZOLIN PER 500 MG: Performed by: NEUROLOGICAL SURGERY

## 2021-11-06 RX ORDER — TRAMADOL HYDROCHLORIDE 50 MG/1
100 TABLET ORAL 3 TIMES DAILY
Status: DISCONTINUED | OUTPATIENT
Start: 2021-11-06 | End: 2021-11-12 | Stop reason: HOSPADM

## 2021-11-06 RX ADMIN — FOLIC ACID 1 MG: 1 TABLET ORAL at 10:19

## 2021-11-06 RX ADMIN — CEFAZOLIN 2 G: 10 INJECTION, POWDER, FOR SOLUTION INTRAVENOUS at 08:12

## 2021-11-06 RX ADMIN — DOCUSATE SODIUM 50 MG AND SENNOSIDES 8.6 MG 2 TABLET: 8.6; 5 TABLET, FILM COATED ORAL at 21:36

## 2021-11-06 RX ADMIN — THIAMINE HCL TAB 100 MG 250 MG: 100 TAB at 10:18

## 2021-11-06 RX ADMIN — ACETAMINOPHEN 650 MG: 325 TABLET, FILM COATED ORAL at 04:21

## 2021-11-06 RX ADMIN — PANTOPRAZOLE SODIUM 40 MG: 40 TABLET, DELAYED RELEASE ORAL at 10:18

## 2021-11-06 RX ADMIN — CEFAZOLIN 2 G: 10 INJECTION, POWDER, FOR SOLUTION INTRAVENOUS at 23:04

## 2021-11-06 RX ADMIN — POTASSIUM CHLORIDE 20 MEQ: 10 CAPSULE, COATED, EXTENDED RELEASE ORAL at 21:35

## 2021-11-06 RX ADMIN — LEVOTHYROXINE SODIUM 75 MCG: 75 TABLET ORAL at 06:29

## 2021-11-06 RX ADMIN — DOCUSATE SODIUM 100 MG: 100 CAPSULE, LIQUID FILLED ORAL at 21:36

## 2021-11-06 RX ADMIN — Medication 250 MG: at 10:18

## 2021-11-06 RX ADMIN — SUCRALFATE 1 G: 1 TABLET ORAL at 17:19

## 2021-11-06 RX ADMIN — ISOSORBIDE MONONITRATE 60 MG: 60 TABLET, EXTENDED RELEASE ORAL at 21:36

## 2021-11-06 RX ADMIN — VALACYCLOVIR HYDROCHLORIDE 500 MG: 500 TABLET, FILM COATED ORAL at 10:18

## 2021-11-06 RX ADMIN — FLUTICASONE PROPIONATE 1 SPRAY: 50 SPRAY, METERED NASAL at 21:37

## 2021-11-06 RX ADMIN — FERROUS SULFATE TAB 325 MG (65 MG ELEMENTAL FE) 325 MG: 325 (65 FE) TAB at 08:12

## 2021-11-06 RX ADMIN — CARVEDILOL 25 MG: 25 TABLET, FILM COATED ORAL at 17:19

## 2021-11-06 RX ADMIN — DOCUSATE SODIUM 50 MG AND SENNOSIDES 8.6 MG 2 TABLET: 8.6; 5 TABLET, FILM COATED ORAL at 10:19

## 2021-11-06 RX ADMIN — TRAMADOL HYDROCHLORIDE 50 MG: 50 TABLET ORAL at 10:18

## 2021-11-06 RX ADMIN — CEFAZOLIN 2 G: 10 INJECTION, POWDER, FOR SOLUTION INTRAVENOUS at 17:19

## 2021-11-06 RX ADMIN — ISOSORBIDE MONONITRATE 60 MG: 60 TABLET, EXTENDED RELEASE ORAL at 10:19

## 2021-11-06 RX ADMIN — TRAMADOL HYDROCHLORIDE 100 MG: 50 TABLET ORAL at 17:19

## 2021-11-06 RX ADMIN — POLYETHYLENE GLYCOL 3350 17 G: 17 POWDER, FOR SOLUTION ORAL at 10:19

## 2021-11-06 RX ADMIN — CARVEDILOL 25 MG: 25 TABLET, FILM COATED ORAL at 08:12

## 2021-11-06 RX ADMIN — MAGNESIUM GLUCONATE 500 MG ORAL TABLET 400 MG: 500 TABLET ORAL at 10:19

## 2021-11-06 RX ADMIN — SUCRALFATE 1 G: 1 TABLET ORAL at 12:04

## 2021-11-06 RX ADMIN — TRAMADOL HYDROCHLORIDE 100 MG: 50 TABLET ORAL at 21:35

## 2021-11-06 RX ADMIN — SUCRALFATE 1 G: 1 TABLET ORAL at 21:36

## 2021-11-06 RX ADMIN — FUROSEMIDE 40 MG: 40 TABLET ORAL at 10:19

## 2021-11-06 RX ADMIN — FLUTICASONE PROPIONATE 1 SPRAY: 50 SPRAY, METERED NASAL at 10:19

## 2021-11-06 RX ADMIN — HYDROCODONE BITARTRATE AND ACETAMINOPHEN 1 TABLET: 10; 325 TABLET ORAL at 19:03

## 2021-11-06 RX ADMIN — SODIUM CHLORIDE, PRESERVATIVE FREE 10 ML: 5 INJECTION INTRAVENOUS at 21:36

## 2021-11-06 RX ADMIN — SUCRALFATE 1 G: 1 TABLET ORAL at 08:12

## 2021-11-06 RX ADMIN — HYDROCODONE BITARTRATE AND ACETAMINOPHEN 1 TABLET: 10; 325 TABLET ORAL at 12:08

## 2021-11-06 RX ADMIN — POTASSIUM CHLORIDE 20 MEQ: 10 CAPSULE, COATED, EXTENDED RELEASE ORAL at 10:18

## 2021-11-06 RX ADMIN — DOCUSATE SODIUM 100 MG: 100 CAPSULE, LIQUID FILLED ORAL at 10:19

## 2021-11-06 NOTE — PLAN OF CARE
Goal Outcome Evaluation:  Plan of Care Reviewed With: patient        Progress: no change  Outcome Summary: OT tx completed on this date. Pt A&O x4, some slight confusion noted. Pt reports that she is in pain 8/10 and fatigued, and would prefer to do activities in the bed. Pt completed functional reaching and midline crossing activities while in fowlers position in bed to increase activity tolerance requiring set up. Pt completed AROM BUE exercises for shoulder/elbow flexion/extension, abduction/abduction, supination/pronation 10 reps x2 sets to increase BUE strength for bed mobility. Pt required repositioning twice for maximum comfort requiring max A. Pt’s sister came in room toward the end of tx. OT POC to continue.

## 2021-11-06 NOTE — PROGRESS NOTES
NEUROSURGERY DAILY PROGRESS NOTE    ASSESSMENT:   Tawny Shin is a 66 y.o. with a significant medical history of prior L3-5 posterior lumbar fusion (Dr. Oropeza 2018), MSSA osteomyelitis of right femur (7/6/2021), C6 corpectomy (3/3/2021), L1-2 osteomyelitis/discitis requiring L1 microdiscectomy (3/23/2021), and T12 kyphoplasty (10/26/2021), rheumatoid arthritis on disease modifying agents, osteoporosis, Sjogren's disease, MI, CAD with stenting, DVT right lower extremity currently on Xarelto and ASA (as of 11/4/2021), hypertension, COPD, hyperlipidemia, hypothyroidism, and she is overweight.  She presents from rehab with L1-3 numbness and pain and worsening proximal leg weakness over last 4-5 days.  Physical exam findings of bright and awake, oriented x3, chronic bilateral  strength weakness, worsening left and right IP (1/5) and quadricep weakness (3/5), BLE (4-/5), gross hyperreflexia and bilateral Babinski's.  Her imaging shows anterior epidural hematoma associated with her T12 compression fracture.      Past Medical History:   Diagnosis Date   • Age-related osteoporosis with current pathological fracture 5/27/2020   • Arthritis    • Asthma    • Bilateral bunions 12/23/2020   • Cancer (HCC)    • Cardiac pacemaker syndrome 12/23/2020    Overview:  - heart block - implanted 11/16   • Charcot's joint of foot, left 12/23/2020   • Chronic deep vein thrombosis (DVT) of right lower extremity (HCC) 6/23/2021   • Chronic pain syndrome 6/22/2021   • Chronic sinusitis    • COPD (chronic obstructive pulmonary disease) ([facility])    • Coronary artery disease    • 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   • Elevated cholesterol    • Eustachian tube dysfunction    • Heart disease    • Herpes simplex    • History of transfusion    • Hyperlipidemia    • Hypertension    • Hypothyroidism 12/23/2020   • Intrinsic asthma 12/23/2020   • Knee dislocation    • Labral tear of right hip joint    • Laryngitis sicca    • Laryngitis,  "chronic    • Left carotid bruit 3/9/2016   • MI (myocardial infarction) ([facility])    • Myalgia due to statin 6/25/2019   • Open wound of right hip 9/14/2021   • Osteomyelitis of right femur ([facility]) 7/6/2021   • Otorrhea    • Pacemaker 11/17/2016   • Primary osteoarthritis of left knee 12/23/2020   • Psoriasis vulgaris 12/23/2020   • S/P coronary artery stent placement 3/9/2016   • Sensorineural hearing loss    • Seropositive rheumatoid arthritis of multiple sites ([facility]) 12/27/2019    Overview:  -myochrysine '93-'96 -methotrexate '96--->11/98;r/s  restarted 2/99--> 8/14 (anemia) -sulfasalazine- not effective -penicillamine 6/98-->10/98; no effect -leflunomide 11/98--> - Humira '13-->didn't take - Enbrel 12/14-->3/15- no effect!   Last Assessment & Plan:  - \"aching all over\" because she had to be off her anti-rheumatic drugs for 2 weeks in preparation for her R knee surgery - he   • Sick sinus syndrome ([facility]) 12/27/2019   • Sjogren's disease ([facility])    • Spondylolisthesis of lumbar region 1/17/2018   • Syncope, recurrent 2/8/2021     Active Hospital Problems    Diagnosis    • Epidural hematoma ([facility])      HPI: Appears to be resting comfortably.  Complains of bilateral hip pain, right > left.  No acute events overnight.    PLAN:   Neuro: Exam unchanged, bilateral lower extremity weakness   Continue neurochecks every 4 hours  CV: No acute issues.  VS WNL  Pulm: No acute issues.  Maintaining O2 sat.  : Indwelling Horan catheter.  FEN: Tolerating regular diet.  .  Repeat CMP and prealbumin pending  GI: No acute issues.  ORTHO: Abnormal MRI from [facility] on 11/3/2021; rim-enhancing fluid collection posterior to the proximal right femur that extends into the screw track; smaller area of fluid collection adjacent to the larger fluid collection; synovial enhancement of the right hip joint space.   Plastics consulted.  Appreciate assistance  ID: WBC normal.  Continue Ancef.  Repeat CBC pending  Heme:  DVT prophylaxis with " "SCDs.  Hold Xarelto and aspirin for now.  H/H 7.3/23.7  Pain: Waxes and wanes.  Dispo: PT/OT    CHIEF COMPLAINT:   L1-3 numbness and pain and worsening proximal leg weakness over last 4-5 days    Subjective  Stable.  Doing well    Temp:  [97.6 °F (36.4 °C)-98.6 °F (37 °C)] 98.3 °F (36.8 °C)  Heart Rate:  [59-68] 62  Resp:  [16-18] 16  BP: ()/(48-64) 153/64    Objective:  General Appearance:  In no acute distress.    Vital signs: (most recent): Blood pressure 153/64, pulse 62, temperature 98.3 °F (36.8 °C), temperature source Oral, resp. rate 16, height 167.6 cm (66\"), weight 71.6 kg (157 lb 14.4 oz), SpO2 94 %, not currently breastfeeding.        Neurologic Exam     Mental Status   Oriented to person, place, and time.   Attention: normal. Concentration: normal.   Speech: speech is normal   Level of consciousness: alert    Bright and awake.  Oriented x3.  Follows commands without prompting.     Cranial Nerves     CN II   Visual fields full to confrontation.     CN III, IV, VI   Pupils are equal, round, and reactive to light.  Extraocular motions are normal.     CN V   Facial sensation intact.     CN VII   Facial expression full, symmetric.     CN VIII   CN VIII normal.     CN IX, X   CN IX normal.     CN XI   CN XI normal.     Motor Exam   Right arm tone: normal  Left arm tone: normal  Right leg tone: normal  Left leg tone: normal    Strength   Right deltoid: 5/5  Left deltoid: 5/5  Right biceps: 5/5  Left biceps: 5/5  Right triceps: 4/5  Left triceps: 4/5  Right wrist extension: 5/5  Left wrist extension: 5/5  Right interossei: 4/5  Left interossei: 4/5  Right iliopsoas: 1/5  Left iliopsoas: 2/5  Right quadriceps: 1/5  Left quadriceps: 2/5  Right anterior tibial: 2/5  Left anterior tibial: 4/5  Right gastroc: 2/5  Left gastroc: 4/5       Sensory Exam   Right arm light touch: normal  Left arm light touch: normal  Right leg light touch: normal  Left leg light touch: normal  Sensory deficit distribution on right: " L1  Sensory deficit distribution on left: L1    Gait, Coordination, and Reflexes     Tremor   Resting tremor: absent  Intention tremor: absent  Action tremor: absent    Drains:   Urethral Catheter (Active)   Daily Indications Chronic Urinary Retention related to Obstructive, Infectious/Inflammatory, Neurologic or Traumatic Causes 11/04/21 2111   Site Assessment Clean; Skin intact 11/04/21 2111   Collection Container Standard drainage bag 11/04/21 2111   Securement Method Securing device 11/04/21 2111   Catheter care complete Yes 11/05/21 0600   Output (mL) 450 mL 11/05/21 0600       [REMOVED] Closed/Suction Drain Right;Anterior Thigh Bulb 15 Fr. (Removed)       [REMOVED] Urethral Catheter Silicone 16 Fr. (Removed)       [REMOVED] External Urinary Catheter (Removed)       [REMOVED] External Urinary Catheter (Removed)   Site Assessment Clean 10/24/21 0919   Application/Removal external catheter applied 10/24/21 0919   Collection Container Wall suction 10/24/21 0919   Securement Method Securing device 10/24/21 0919   Output (mL) 350 mL 10/24/21 1841       [REMOVED] External Urinary Catheter (Removed)   Site Assessment Clean; Skin intact 10/27/21 2013   Application/Removal external catheter changed 10/28/21 1025   Collection Container Wall suction 10/28/21 1025   Wall suction (mmHG) 80 mmHG 10/28/21 1025   Securement Method Securing device 10/28/21 1025   Catheter care complete Yes 10/27/21 0815   Output (mL) 400 mL 10/28/21 0300     Imaging Results (Last 24 Hours)     ** No results found for the last 24 hours. **        Lab Results (last 24 hours)     Procedure Component Value Units Date/Time    Prealbumin [[id number]]  (Abnormal) Collected: 11/05/21 0902    Specimen: Blood Updated: 11/05/21 2042     Prealbumin 13.6 mg/dL         [id number]  Bandar Shea MD

## 2021-11-06 NOTE — PROGRESS NOTES
NEUROSURGERY DAILY PROGRESS NOTE    ASSESSMENT:   Tawny Shin is a 66 y.o. with a significant medical history of prior L3-5 posterior lumbar fusion (Dr. Oropeza 2018), MSSA osteomyelitis of right femur (7/6/2021), C6 corpectomy (3/3/2021), L1-2 osteomyelitis/discitis requiring L1 microdiscectomy (3/23/2021), and T12 kyphoplasty (10/26/2021), rheumatoid arthritis on disease modifying agents, osteoporosis, Sjogren's disease, MI, CAD with stenting, DVT right lower extremity currently on Xarelto and ASA (as of 11/4/2021), hypertension, COPD, hyperlipidemia, hypothyroidism, and she is overweight.  She presents from rehab with L1-3 numbness and pain and worsening proximal leg weakness over last 4-5 days.  Physical exam findings of bright and awake, oriented x3, chronic bilateral  strength weakness, worsening left and right IP (1/5) and quadricep weakness (3/5), BLE (4-/5), gross hyperreflexia and bilateral Babinski's.  Her imaging shows anterior epidural hematoma associated with her T12 compression fracture.      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   • Elevated cholesterol    • Eustachian tube dysfunction    • Heart disease    • Herpes simplex    • History of transfusion    • Hyperlipidemia    • Hypertension    • Hypothyroidism 12/23/2020   • Intrinsic asthma 12/23/2020   • Knee dislocation    • Labral tear of right hip joint    • Laryngitis sicca    • Laryngitis,  "chronic    • Left carotid bruit 3/9/2016   • MI (myocardial infarction) ([facility])    • Myalgia due to statin 6/25/2019   • Open wound of right hip 9/14/2021   • Osteomyelitis of right femur ([facility]) 7/6/2021   • Otorrhea    • Pacemaker 11/17/2016   • Primary osteoarthritis of left knee 12/23/2020   • Psoriasis vulgaris 12/23/2020   • S/P coronary artery stent placement 3/9/2016   • Sensorineural hearing loss    • Seropositive rheumatoid arthritis of multiple sites ([facility]) 12/27/2019    Overview:  -myochrysine '93-'96 -methotrexate '96--->11/98;r/s  restarted 2/99--> 8/14 (anemia) -sulfasalazine- not effective -penicillamine 6/98-->10/98; no effect -leflunomide 11/98--> - Humira '13-->didn't take - Enbrel 12/14-->3/15- no effect!   Last Assessment & Plan:  - \"aching all over\" because she had to be off her anti-rheumatic drugs for 2 weeks in preparation for her R knee surgery - he   • Sick sinus syndrome ([facility]) 12/27/2019   • Sjogren's disease ([facility])    • Spondylolisthesis of lumbar region 1/17/2018   • Syncope, recurrent 2/8/2021     Active Hospital Problems    Diagnosis    • Epidural hematoma ([facility])      HPI: Appears to be resting comfortably.  Complains of bilateral hip pain, right > left.  No acute events overnight.    PLAN:   Neuro: Exam unchanged, bilateral lower extremity weakness   Continue neurochecks every 4 hours   Plan for [facility]hab or [facility]    CV: No acute issues.  VS WNL   Off anticoagulation for epidural hematoma  Pulm: No acute issues.  Maintaining O2 sat.  : Indwelling Horan catheter.  FEN: Tolerating regular diet.  .     Malnutrition.  Prealb low.  Consult nutrition.   GI: No acute issues.  ORTHO: Abnormal MRI from [facility] on 11/3/2021; rim-enhancing fluid collection posterior to the proximal right femur that extends into the screw track; smaller area of fluid collection adjacent to the larger fluid collection; synovial enhancement of the right hip joint space.   Plastics " "consulted.  Appreciate assistance  ID: WBC normal.  Continue Ancef.  Repeat CBC pending   CRP close to baseline 9.84  Heme:  DVT prophylaxis with SCDs.  Hold Xarelto and begin aspirin 81mg.     H/H 7.3/23.7.  Given heart status and off of anticoaglation will give blood today.   Pain: Waxes and wanes. Increase slightly/   Dispo: PT/OT   Plan for [facility] vs [facility] if possible.     CHIEF COMPLAINT:   L1-3 numbness and pain and worsening proximal leg weakness over last 4-5 days    Subjective  Stable.  Doing well    Temp:  [97.6 °F (36.4 °C)-98.6 °F (37 °C)] 98.3 °F (36.8 °C)  Heart Rate:  [59-68] 62  Resp:  [16-18] 16  BP: ()/(48-64) 153/64    Objective:  General Appearance:  In no acute distress.    Vital signs: (most recent): Blood pressure 153/64, pulse 62, temperature 98.3 °F (36.8 °C), temperature source Oral, resp. rate 16, height 167.6 cm (66\"), weight 71.6 kg (157 lb 14.4 oz), SpO2 94 %, not currently breastfeeding.        Neurologic Exam     Mental Status   Oriented to person, place, and time.   Attention: normal. Concentration: normal.   Speech: speech is normal   Level of consciousness: alert    Bright and awake.  Oriented x3.  Follows commands without prompting.     Cranial Nerves     CN II   Visual fields full to confrontation.     CN III, IV, VI   Pupils are equal, round, and reactive to light.  Extraocular motions are normal.     CN V   Facial sensation intact.     CN VII   Facial expression full, symmetric.     CN VIII   CN VIII normal.     CN IX, X   CN IX normal.     CN XI   CN XI normal.     Motor Exam   Right arm tone: normal  Left arm tone: normal  Right leg tone: normal  Left leg tone: normal    Strength   Right deltoid: 5/5  Left deltoid: 5/5  Right biceps: 5/5  Left biceps: 5/5  Right triceps: 4/5  Left triceps: 4/5  Right wrist extension: 5/5  Left wrist extension: 5/5  Right interossei: 4/5  Left interossei: 4/5  Right iliopsoas: 1/5  Left iliopsoas: 2/5  Right quadriceps: " 1/5  Left quadriceps: 2/5  Right anterior tibial: 2/5  Left anterior tibial: 4/5  Right gastroc: 2/5  Left gastroc: 4/5       Sensory Exam   Right arm light touch: normal  Left arm light touch: normal  Right leg light touch: normal  Left leg light touch: normal  Sensory deficit distribution on right: L1  Sensory deficit distribution on left: L1    Gait, Coordination, and Reflexes     Tremor   Resting tremor: absent  Intention tremor: absent  Action tremor: absent    Drains:   Urethral Catheter (Active)   Daily Indications Chronic Urinary Retention related to Obstructive, Infectious/Inflammatory, Neurologic or Traumatic Causes 11/04/21 2111   Site Assessment Clean; Skin intact 11/04/21 2111   Collection Container Standard drainage bag 11/04/21 2111   Securement Method Securing device 11/04/21 2111   Catheter care complete Yes 11/05/21 0600   Output (mL) 450 mL 11/05/21 0600       [REMOVED] Closed/Suction Drain Right;Anterior Thigh Bulb 15 Fr. (Removed)       [REMOVED] Urethral Catheter Silicone 16 Fr. (Removed)       [REMOVED] External Urinary Catheter (Removed)       [REMOVED] External Urinary Catheter (Removed)   Site Assessment Clean 10/24/21 0919   Application/Removal external catheter applied 10/24/21 0919   Collection Container Wall suction 10/24/21 0919   Securement Method Securing device 10/24/21 0919   Output (mL) 350 mL 10/24/21 1841       [REMOVED] External Urinary Catheter (Removed)   Site Assessment Clean; Skin intact 10/27/21 2013   Application/Removal external catheter changed 10/28/21 1025   Collection Container Wall suction 10/28/21 1025   Wall suction (mmHG) 80 mmHG 10/28/21 1025   Securement Method Securing device 10/28/21 1025   Catheter care complete Yes 10/27/21 0815   Output (mL) 400 mL 10/28/21 0300     Imaging Results (Last 24 Hours)     ** No results found for the last 24 hours. **        Lab Results (last 24 hours)     Procedure Component Value Units Date/Time    Prealbumin [[id number]]   (Abnormal) Collected: 11/05/21 0902    Specimen: Blood Updated: 11/05/21 2042     Prealbumin 13.6 mg/dL         [id number]  Bandar Shea MD

## 2021-11-06 NOTE — PLAN OF CARE
Problem: Adult Inpatient Plan of Care  Goal: Plan of Care Review  Outcome: Ongoing, Progressing  Flowsheets (Taken 11/6/2021 6620)  Progress: no change  Plan of Care Reviewed With: patient  Outcome Summary: Pt is A&Ox4. PRn pain meds given with good relief. Still having weakness in legs and can only move her feet. SCDs. ROM performed a few times with both legs. Feet elevated on pillows. Horan in place. PICC maintained. VSS. Safety maintained.

## 2021-11-06 NOTE — THERAPY TREATMENT NOTE
Acute Care - Occupational Therapy Treatment Note  [facility]     Patient Name: Tawny Shin  : 1955  MRN: [id number]  Today's Date: 2021             Admit Date: 2021       ICD-10-CM ICD-9-CM   1. Impaired mobility  Z74.09 799.89   2. Impaired mobility and ADLs  Z74.09 V49.89    Z78.9    3. Dysphagia, unspecified type  R13.10 787.20     Patient Active Problem List   Diagnosis   • T12 compression fracture, initial encounter (Formerly [facility])   • Chronic embolism and thrombosis of unspecified deep veins of left lower extremity (Formerly [facility])   • Urinary tract infection without hematuria   • Acute bilateral thoracic back pain   • Chronic anticoagulation   • Hypokalemia   • Transaminitis   • Iron deficiency anemia   • Osteoporosis   • Bacteremia   • Moderate malnutrition (CMS/Formerly [facility])   • Epidural hematoma (Formerly [facility])     Past Medical History:   Diagnosis Date   • Age-related osteoporosis with current pathological fracture 2020   • Arthritis    • Asthma    • Bilateral bunions 2020   • Cancer (Formerly [facility])    • Cardiac pacemaker syndrome 2020    Overview:  - heart block - implanted    • Charcot's joint of foot, left 2020   • Chronic deep vein thrombosis (DVT) of right lower extremity (Formerly [facility]) 2021   • Chronic pain syndrome 2021   • Chronic sinusitis    • COPD (chronic obstructive pulmonary disease) (Formerly [facility])    • Coronary artery disease    • 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   • Elevated cholesterol    • Eustachian tube dysfunction    • Heart disease    • Herpes simplex    • History of transfusion    • Hyperlipidemia    • Hypertension    • Hypothyroidism 2020   • Intrinsic asthma 2020   • Knee dislocation    • Labral tear of right hip joint    • Laryngitis sicca    • Laryngitis, chronic    • Left carotid bruit 3/9/2016   • MI (myocardial infarction) (Formerly [facility])    • Myalgia due to statin 2019   • Open wound of right hip 2021   • Osteomyelitis of right femur (Formerly [facility]) 2021   •  "Otorrhea    • Pacemaker 11/17/2016   • Primary osteoarthritis of left knee 12/23/2020   • Psoriasis vulgaris 12/23/2020   • S/P coronary artery stent placement 3/9/2016   • Sensorineural hearing loss    • Seropositive rheumatoid arthritis of multiple sites (HCC) 12/27/2019    Overview:  -myochrysine '93-'96 -methotrexate '96--->11/98;r/s  restarted 2/99--> 8/14 (anemia) -sulfasalazine- not effective -penicillamine 6/98-->10/98; no effect -leflunomide 11/98--> - Humira '13-->didn't take - Enbrel 12/14-->3/15- no effect!   Last Assessment & Plan:  - \"aching all over\" because she had to be off her anti-rheumatic drugs for 2 weeks in preparation for her R knee surgery - he   • Sick sinus syndrome (Formerly [facility]) 12/27/2019   • Sjogren's disease (Formerly [facility])    • Spondylolisthesis of lumbar region 1/17/2018   • Syncope, recurrent 2/8/2021     Past Surgical History:   Procedure Laterality Date   • A-V CARDIAC PACEMAKER INSERTION  2016   • ATRIAL CARDIAC PACEMAKER INSERTION     • CARDIAC CATHETERIZATION     • CATARACT EXTRACTION     • CERVICAL CORPECTOMY N/A 3/3/2021    Procedure: CERVICAL 6 CORPECTOMY WITH TITANIUM CAGE WITH NEURO MONITORING;  Surgeon: Bandar Shea MD;  Location: [facility] OR;  Service: Neurosurgery;  Laterality: N/A;   • COLONOSCOPY  11/08/2011    One fold in the ascending colon which showed ulcer otherwise normal exam   • COLONOSCOPY  11/12/2004    Normal exam repeat in five years   • CORONARY ANGIOPLASTY WITH STENT PLACEMENT      X 2; 2013 & 2014   • ENDOSCOPY  07/10/2014    Normal exam   • FLAP LEG Right 9/14/2021    Procedure: RIGHT GLUTEAL FASCIOCUTANEOUS ADVANCEMENT FLAP AND RIGHT TENSOR FASCIAL JESSICA FLAP;  Surgeon: Amadeo Turner MD;  Location:  [facility] OR;  Service: Plastics;  Laterality: Right;   • HIP ABDUCTION TENOTOMY BILATERAL Right 1/14/2021    Procedure: RIGHT HIP GLUTEUS MEDLUS / MINIMUS REPAIR, POSSIBLE ACHILLES ALLOGRAFT;  Surgeon: Nino Carlson MD;  Location:  [facility] OR;  Service: Orthopedics;  " Laterality: Right;   • INCISION AND DRAINAGE HIP Right 2/9/2021    Procedure: HIP INCISION AND DRAINAGE;  Surgeon: Nino Carlson MD;  Location:  [facility] OR;  Service: Orthopedics;  Laterality: Right;   • INCISION AND DRAINAGE LEG Right 10/24/2021    Procedure: INCISION AND DRAINAGE LOWER EXTREMITY;  Surgeon: Amadeo Turner MD;  Location:  [facility] OR;  Service: Plastics;  Laterality: Right;   • INCISION AND DRAINAGE OF WOUND Right 7/8/2021    Procedure: INCISION AND DRAINAGE WOUND RIGHT HIP;  Surgeon: James Huntley MD;  Location:  [facility] OR;  Service: Orthopedics;  Laterality: Right;   • JOINT REPLACEMENT     • KYPHOPLASTY WITH BIOPSY Bilateral 10/26/2021    Procedure: THOARCIC 12 KYPHOPLASTY WITH BIOPSY;  Surgeon: Bandar Shea MD;  Location: [facility] OR;  Service: Neurosurgery;  Laterality: Bilateral;   • LEG DEBRIDEMENT Right 9/14/2021    Procedure: DEBRIDEMENT OF RIGHT HIP WOUND, RIGHT GLUTEAL FASCIOCUTANEOUS ADVANCEMENT FLAP AND RIGHT TENSOR FASCIAL JESSICA FLAP;  Surgeon: Amadeo Turner MD;  Location:  [facility] OR;  Service: Plastics;  Laterality: Right;   • LUMBAR DISCECTOMY Right 3/23/2021    Procedure: LUMBAR DISCECTOMY MICRO, Lumbar 1/2 right;  Surgeon: Bandar Shea MD;  Location: [facility] OR;  Service: Neurosurgery;  Laterality: Right;   • LUMBAR FUSION N/A 1/19/2018    Procedure: L3-4,L4-5 DECOMPRESSION, POSTERIOR SPINAL FUSION WITH INSTRUMENTATION;  Surgeon: Fortino Oropeza MD;  Location: [facility] OR;  Service:    • LUMBAR LAMINECTOMY WITH FUSION Left 1/17/2018    Procedure: LEFT L3-4 L4-5 LATERAL LUMBAR INTERBODY FUSION;  Surgeon: Fortino Oropeza MD;  Location: [facility] OR;  Service:    • MYRINGOTOMY W/ TUBES  09/04/2014    TUBES NO LONGER IN PLACE   • OTHER SURGICAL HISTORY      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 so hardware was removed and spacers were placed   • REPLACEMENT TOTAL KNEE Right          OT ASSESSMENT FLOWSHEET (last 12 hours)     OT Evaluation and Treatment     Row  Name 11/06/21 1250                   OT Time and Intention    Subjective Information weakness; fatigue; pain  -LS        Document Type therapy note (daily note)  -LS        Mode of Treatment occupational therapy  -LS        Patient Effort fair  -LS                  General Information    Patient Profile Reviewed yes  -LS        Barriers to Rehab medically complex  -LS                  Cognition    Orientation Status (Cognition) oriented x 4  -LS                  Pain Assessment    Additional Documentation Pain Scale: Numbers Pre/Post-Treatment (Group)  -LS                  Pain Scale: Numbers Pre/Post-Treatment    Pretreatment Pain Rating 8/10  -LS        Posttreatment Pain Rating 8/10  -LS        Pain Location - Side Bilateral  -LS        Pain Location - Orientation lower  -LS        Pain Location hip; back  -LS        Pain Intervention(s) Ambulation/increased activity  -LS                  Shoulder (Therapeutic Exercise)    Shoulder (Therapeutic Exercise) AROM (active range of motion)  -LS        Shoulder AROM (Therapeutic Exercise) bilateral; flexion; extension; horizontal aBduction/aDduction; supine; 10 repetitions; 2 sets  -LS                  Elbow/Forearm (Therapeutic Exercise)    Elbow/Forearm (Therapeutic Exercise) AROM (active range of motion)  -LS        Elbow/Forearm Strengthening (Therapeutic Exercise) bilateral; flexion; extension; supination; pronation; supine; 10 repetitions; 2 sets  -LS                  Wound 10/24/21 0819 Right hip Incision    Wound - Properties Group Placement Date: 10/24/21  -DT Placement Time: 0819  -DT Present on Hospital Admission: N  -DT Side: Right  -DT Location: hip  -DT Primary Wound Type: Incision  -DT        Retired Wound - Properties Group Date first assessed: 10/24/21  -DT Time first assessed: 0819  -DT Present on Hospital Admission: N  -DT Side: Right  -DT Location: hip  -DT Primary Wound Type: Incision  -DT                  Coping    Observed Emotional State calm;  cooperative  -LS        Verbalized Emotional State acceptance  -LS                  Plan of Care Review    Plan of Care Reviewed With patient  -LS        Progress no change  -LS        Outcome Summary OT tx completed on this date. Pt A&O x4, some slight confusion noted. Pt reports that she is in pain 8/10 and fatigued, and would prefer to do activities in the bed. Pt completed functional reaching and midline crossing activities while in fowlers position in bed to increase activity tolerance requiring set up. Pt completed AROM BUE exercises for shoulder/elbow flexion/extension, abduction/abduction, supination/pronation 10 reps x2 sets to increase BUE strength for bed mobility. Pt required repositioning twice for maximum comfort requiring max A. Pt’s sister came in room toward the end of tx. OT POC to continue.  -LS                  Vital Signs    O2 Delivery Pre Treatment room air  -LS        O2 Delivery Intra Treatment room air  -LS        O2 Delivery Post Treatment room air  -LS        Intra Patient Position Supine  -LS        Post Patient Position Supine  -LS                  Positioning and Restraints    Pre-Treatment Position in bed  -LS        Post Treatment Position bed  -LS        In Bed fowlers; call light within reach; encouraged to call for assist; supine  -LS              User Key  (r) = Recorded By, (t) = Taken By, (c) = Cosigned By    Initials Name Effective Dates    DT Jean Carlos Chong RN 06/16/21 -     Tara Rodriguez COTA 09/15/21 -                  Occupational Therapy Education                 Title: PT OT SLP Therapies (In Progress)     Topic: Occupational Therapy (In Progress)     Point: ADL training (In Progress)     Description:   Instruct learner(s) on proper safety adaptation and remediation techniques during self care or transfers.   Instruct in proper use of assistive devices.              Learning Progress Summary           Patient Acceptance, E,D, NR by MARCI at 11/5/2021 1402     Acceptance, E,D, NR by  at 11/4/2021 1545                   Point: Home exercise program (In Progress)     Description:   Instruct learner(s) on appropriate technique for monitoring, assisting and/or progressing therapeutic exercises/activities.              Learning Progress Summary           Patient Acceptance, E,D, NR by DESHAWN at 11/4/2021 1545                   Point: Precautions (In Progress)     Description:   Instruct learner(s) on prescribed precautions during self-care and functional transfers.              Learning Progress Summary           Patient Acceptance, E,D, NR by  at 11/4/2021 1545                   Point: Body mechanics (In Progress)     Description:   Instruct learner(s) on proper positioning and spine alignment during self-care, functional mobility activities and/or exercises.              Learning Progress Summary           Patient Acceptance, E,D, NR by MARCI at 11/5/2021 1204    Acceptance, E,D, NR by DESHAWN at 11/4/2021 1545                               User Key     Initials Effective Dates Name Provider Type Discipline     08/28/18 -  Adeola Payne, OTR/L Occupational Therapist OT     08/04/21 -  Ashley Wagner, OT Student OT Student OT                  OT Recommendation and Plan     Plan of Care Review  Plan of Care Reviewed With: patient  Progress: no change  Outcome Summary: OT tx completed on this date. Pt A&O x4, some slight confusion noted. Pt reports that she is in pain 8/10 and fatigued, and would prefer to do activities in the bed. Pt completed functional reaching and midline crossing activities while in fowlers position in bed to increase activity tolerance requiring set up. Pt completed AROM BUE exercises for shoulder/elbow flexion/extension, abduction/abduction, supination/pronation 10 reps x2 sets to increase BUE strength for bed mobility. Pt required repositioning twice for maximum comfort requiring max A. Pt’s sister came in room toward the end of tx. OT POC to  continue.  Plan of Care Reviewed With: patient  Outcome Summary: OT tx completed on this date. Pt A&O x4, some slight confusion noted. Pt reports that she is in pain 8/10 and fatigued, and would prefer to do activities in the bed. Pt completed functional reaching and midline crossing activities while in fowlers position in bed to increase activity tolerance requiring set up. Pt completed AROM BUE exercises for shoulder/elbow flexion/extension, abduction/abduction, supination/pronation 10 reps x2 sets to increase BUE strength for bed mobility. Pt required repositioning twice for maximum comfort requiring max A. Pt’s sister came in room toward the end of tx. OT POC to continue.     Outcome Measures     Row Name 11/06/21 1300             How much help from another is currently needed...    Putting on and taking off regular lower body clothing? 1  -LS      Bathing (including washing, rinsing, and drying) 2  -LS      Toileting (which includes using toilet bed pan or urinal) 1  -LS      Putting on and taking off regular upper body clothing 2  -LS      Taking care of personal grooming (such as brushing teeth) 3  -LS      Eating meals 3  -LS      AM-PAC 6 Clicks Score (OT) 12  -LS              Functional Assessment    Outcome Measure Options AM-PAC 6 Clicks Daily Activity (OT)  -LS            User Key  (r) = Recorded By, (t) = Taken By, (c) = Cosigned By    Initials Name Provider Type    Tara Rodriguez COTA Occupational Therapy Assistant                Time Calculation:    Time Calculation- OT     Row Name 11/06/21 1339             Time Calculation- OT    OT Start Time 1245  -LS      OT Stop Time 1332  -      OT Time Calculation (min) 47 min  -      Total Timed Code Minutes- OT 47 minute(s)  -LS      OT Received On 11/06/21  -            User Key  (r) = Recorded By, (t) = Taken By, (c) = Cosigned By    Initials Name Provider Type    Tara Rodriguez COTA Occupational Therapy Assistant              Therapy Charges  for Today     Code Description Service Date Service Provider Modifiers Qty    [id number] HC OT THERAPEUTIC ACT EA 15 MIN 11/6/2021 Tara Guerrero COTA GO 3               GALLITO VALENZUELA  11/6/2021

## 2021-11-06 NOTE — THERAPY TREATMENT NOTE
Acute Care - Physical Therapy Treatment Note  [facility]     Patient Name: Tawny Shin  : 1955  MRN: [id number]  Today's Date: 2021      Visit Dx:     ICD-10-CM ICD-9-CM   1. Impaired mobility  Z74.09 799.89   2. Impaired mobility and ADLs  Z74.09 V49.89    Z78.9    3. Dysphagia, unspecified type  R13.10 787.20     Patient Active Problem List   Diagnosis   • T12 compression fracture, initial encounter ([facility])   • Chronic embolism and thrombosis of unspecified deep veins of left lower extremity ([facility])   • Urinary tract infection without hematuria   • Acute bilateral thoracic back pain   • Chronic anticoagulation   • Hypokalemia   • Transaminitis   • Iron deficiency anemia   • Osteoporosis   • Bacteremia   • Moderate malnutrition (CMS/[facility])   • Epidural hematoma ([facility])     Past Medical History:   Diagnosis Date   • Age-related osteoporosis with current pathological fracture 2020   • Arthritis    • Asthma    • Bilateral bunions 2020   • Cancer ([facility])    • Cardiac pacemaker syndrome 2020    Overview:  - heart block - implanted    • Charcot's joint of foot, left 2020   • Chronic deep vein thrombosis (DVT) of right lower extremity ([facility]) 2021   • Chronic pain syndrome 2021   • Chronic sinusitis    • COPD (chronic obstructive pulmonary disease) (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)    • Myalgia due to statin 2019   • Open wound of right hip 2021   • Osteomyelitis of right femur ([facility]) 2021   • Otorrhea    • Pacemaker  "11/17/2016   • Primary osteoarthritis of left knee 12/23/2020   • Psoriasis vulgaris 12/23/2020   • S/P coronary artery stent placement 3/9/2016   • Sensorineural hearing loss    • Seropositive rheumatoid arthritis of multiple sites (HCC) 12/27/2019    Overview:  -myochrysine '93-'96 -methotrexate '96--->11/98;r/s  restarted 2/99--> 8/14 (anemia) -sulfasalazine- not effective -penicillamine 6/98-->10/98; no effect -leflunomide 11/98--> - Humira '13-->didn't take - Enbrel 12/14-->3/15- no effect!   Last Assessment & Plan:  - \"aching all over\" because she had to be off her anti-rheumatic drugs for 2 weeks in preparation for her R knee surgery - he   • Sick sinus syndrome ([facility]) 12/27/2019   • Sjogren's disease ([facility])    • Spondylolisthesis of lumbar region 1/17/2018   • Syncope, recurrent 2/8/2021     Past Surgical History:   Procedure Laterality Date   • A-V CARDIAC PACEMAKER INSERTION  2016   • ATRIAL CARDIAC PACEMAKER INSERTION     • CARDIAC CATHETERIZATION     • CATARACT EXTRACTION     • CERVICAL CORPECTOMY N/A 3/3/2021    Procedure: CERVICAL 6 CORPECTOMY WITH TITANIUM CAGE WITH NEURO MONITORING;  Surgeon: Bandar Shea MD;  Location: [facility] OR;  Service: Neurosurgery;  Laterality: N/A;   • COLONOSCOPY  11/08/2011    One fold in the ascending colon which showed ulcer otherwise normal exam   • COLONOSCOPY  11/12/2004    Normal exam repeat in five years   • CORONARY ANGIOPLASTY WITH STENT PLACEMENT      X 2; 2013 & 2014   • ENDOSCOPY  07/10/2014    Normal exam   • FLAP LEG Right 9/14/2021    Procedure: RIGHT GLUTEAL FASCIOCUTANEOUS ADVANCEMENT FLAP AND RIGHT TENSOR FASCIAL JESSICA FLAP;  Surgeon: Amadeo Turner MD;  Location:  [facility] OR;  Service: Plastics;  Laterality: Right;   • HIP ABDUCTION TENOTOMY BILATERAL Right 1/14/2021    Procedure: RIGHT HIP GLUTEUS MEDLUS / MINIMUS REPAIR, POSSIBLE ACHILLES ALLOGRAFT;  Surgeon: Nino Carlson MD;  Location:  [facility] OR;  Service: Orthopedics;  Laterality: Right;   • " INCISION AND DRAINAGE HIP Right 2/9/2021    Procedure: HIP INCISION AND DRAINAGE;  Surgeon: Nino Carlson MD;  Location:  [facility] OR;  Service: Orthopedics;  Laterality: Right;   • INCISION AND DRAINAGE LEG Right 10/24/2021    Procedure: INCISION AND DRAINAGE LOWER EXTREMITY;  Surgeon: Amadeo Turner MD;  Location:  [facility] OR;  Service: Plastics;  Laterality: Right;   • INCISION AND DRAINAGE OF WOUND Right 7/8/2021    Procedure: INCISION AND DRAINAGE WOUND RIGHT HIP;  Surgeon: James Huntley MD;  Location:  [facility] OR;  Service: Orthopedics;  Laterality: Right;   • JOINT REPLACEMENT     • KYPHOPLASTY WITH BIOPSY Bilateral 10/26/2021    Procedure: THOARCIC 12 KYPHOPLASTY WITH BIOPSY;  Surgeon: Bandar Shea MD;  Location:  [facility] OR;  Service: Neurosurgery;  Laterality: Bilateral;   • LEG DEBRIDEMENT Right 9/14/2021    Procedure: DEBRIDEMENT OF RIGHT HIP WOUND, RIGHT GLUTEAL FASCIOCUTANEOUS ADVANCEMENT FLAP AND RIGHT TENSOR FASCIAL JESSICA FLAP;  Surgeon: Amadeo Turner MD;  Location:  [facility] OR;  Service: Plastics;  Laterality: Right;   • LUMBAR DISCECTOMY Right 3/23/2021    Procedure: LUMBAR DISCECTOMY MICRO, Lumbar 1/2 right;  Surgeon: Bandar Shea MD;  Location:  [facility] OR;  Service: Neurosurgery;  Laterality: Right;   • LUMBAR FUSION N/A 1/19/2018    Procedure: L3-4,L4-5 DECOMPRESSION, POSTERIOR SPINAL FUSION WITH INSTRUMENTATION;  Surgeon: Fortino Oropeza MD;  Location:  [facility] OR;  Service:    • LUMBAR LAMINECTOMY WITH FUSION Left 1/17/2018    Procedure: LEFT L3-4 L4-5 LATERAL LUMBAR INTERBODY FUSION;  Surgeon: Fortino Oropeza MD;  Location:  [facility] OR;  Service:    • MYRINGOTOMY W/ TUBES  09/04/2014    TUBES NO LONGER IN PLACE   • OTHER SURGICAL HISTORY      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 so hardware was removed and spacers were placed   • REPLACEMENT TOTAL KNEE Right      PT Assessment (last 12 hours)     PT Evaluation and Treatment     Row Name 11/06/21 1027 11/06/21 0818        Physical Therapy Time and Intention    Subjective Information complains of; pain; weakness  - --  -    Document Type therapy note (daily note)  - --    Mode of Treatment physical therapy  - --    Session Not Performed -- other (see comments)  -    Comment, Session Not Performed -- pt requested to check back after breakfast  -    Row Name 11/06/21 1027          General Information    Existing Precautions/Restrictions fall; spinal; brace worn when out of bed  TLSO  -     Barriers to Rehab medically complex; previous functional deficit; physical barrier  -     Row Name 11/06/21 1027          Pain Scale: Numbers Pre/Post-Treatment    Pretreatment Pain Rating 8/10  -     Posttreatment Pain Rating 8/10  -     Pain Location - Side Right  -     Pain Location hip  -     Row Name 11/06/21 1027          Pain Scale: Word Pre/Post-Treatment    Pain Intervention(s) Medication (See MAR); Repositioned  -     Row Name 11/06/21 1027          Aerobic Exercise    Comment, Aerobic Exercise (Therapeutic Exercise) P-AAROM BLE'S 10 x 2 reps, LLE is stronger than RLE  -[facility] Name             Wound 10/24/21 0819 Right hip Incision    Wound - Properties Group Placement Date: 10/24/21  -DT Placement Time: 0819  -DT Present on Hospital Admission: N  -DT Side: Right  -DT Location: hip  -DT Primary Wound Type: Incision  -DT     Retired Wound - Properties Group Date first assessed: 10/24/21  -DT Time first assessed: 0819  -DT Present on Hospital Admission: N  -DT Side: Right  -DT Location: hip  -DT Primary Wound Type: Incision  -DT     Row Name 11/06/21 1027          Positioning and Restraints    Pre-Treatment Position in bed  -     Post Treatment Position bed  -AH     In Bed fowlers; call light within reach; encouraged to call for assist; with [facility]           User Key  (r) = Recorded By, (t) = Taken By, (c) = Cosigned By    Initials Name Provider Type    Olga Rao PTA Physical Therapy Assistant    DT  Jean Carlos Chong, RN Registered Nurse              Physical Therapy Education                 Title: PT OT SLP Therapies (In Progress)     Topic: Physical Therapy (In Progress)     Point: Mobility training (Done)     Learning Progress Summary           Patient Acceptance, E, VU,NR by  at 11/4/2021 1509    Comment: Educated pt on spinal precautions, proper body mechanics during bed mobility transfers, POC, and d/c.                   Point: Home exercise program (Not Started)     Learner Progress:  Not documented in this visit.          Point: Body mechanics (Done)     Learning Progress Summary           Patient Acceptance, E, VU,NR by  at 11/4/2021 1509    Comment: Educated pt on spinal precautions, proper body mechanics during bed mobility transfers, POC, and d/c.                   Point: Precautions (Done)     Learning Progress Summary           Patient Acceptance, E, VU,NR by  at 11/4/2021 1509    Comment: Educated pt on spinal precautions, proper body mechanics during bed mobility transfers, POC, and d/c.                               User Key     Initials Effective Dates Name Provider Type [facility] 09/07/21 -  Juliana Angela, PT Student PT Student PT              PT Recommendation and Plan             Time Calculation:    PT Charges     Row Name 11/06/21 1053             Time Calculation    Start Time 1027  -      Stop Time 1050  -      Time Calculation (min) 23 min  -AH      PT Received On 11/06/21  -              Time Calculation- PT    Total Timed Code Minutes- PT 23 minute(s)  -              Timed Charges    32716 - PT Therapeutic Exercise Minutes 23  -AH              Total Minutes    Timed Charges Total Minutes 23  -AH       Total Minutes 23  -AH            User Key  (r) = Recorded By, (t) = Taken By, (c) = Cosigned By    Initials Name Provider Type     Olga Chambers, PTA Physical Therapy Assistant              Therapy Charges for Today     Code Description Service Date Service  Provider Modifiers Qty    [id number] HC PT THER PROC EA 15 MIN 11/6/2021 Olga Chambers, PTA GP 2          PT G-Codes  Outcome Measure Options: AM-PAC 6 Clicks Daily Activity (OT)  AM-PAC 6 Clicks Score (PT): 10  AM-PAC 6 Clicks Score (OT): 12    Olga Chambers PTA  11/6/2021

## 2021-11-06 NOTE — PROGRESS NOTES
Malnutrition Severity Assessment    Patient Name:  Tawny Shin  YOB: 1955  MRN: [id number]  Admit Date:  11/4/2021    Patient meets criteria for : Severe Malnutrition    Comments: In setting of severe malnutrition and poor oral intake, recommend diet liberalization.  Pt currently on Cardiac/CCHO/Renal diet with texture modification to mechanical soft per SLP.      At present diet restricts sodium, fat, carbohydrates, protein, high potassium foods, and high phosphorus food. To allow Pt MAXIMUM diet selection, recommend continuing only mechanical soft restrictions at this time.  Pt's current PO intake is 18.75% of 4 meals over 2 days.    Recommend to continue creamed based soups BID as recommended by SLP, recommend continued magic cup BID, added Boost Plus daily. Continue to encourage adequate hydration. Also recommend appetite stimulant if not otherwise clinically contraindicated. RD will continue to follow.    Malnutrition Severity Assessment  Malnutrition Type: Chronic Disease - Related Malnutrition  Malnutrition Type (last 8 hours)     Malnutrition Severity Assessment     Row Name 11/06/21 1625       Malnutrition Severity Assessment    Malnutrition Type Chronic Disease - Related Malnutrition    Row Name 11/06/21 1625       Insufficient Energy Intake     Insufficient Energy Intake Findings Severe    Insufficient Energy Intake  <50% of est. energy requirement for >or equal to 1 month    Row Name 11/06/21 1625       Unintentional Weight Loss     Unintentional Weight Loss Findings Severe    Unintentional Weight Loss  Weight loss greater than 10% in six months  based on review of EPIC records, loss of at least 20 lbs (10%) since June of this yr; current bed scale wt is 21 lbs less than wt recorded two weeks ago, so if accurate total wt loss now at ~43 lbs (21.5%) in < 6 months.    Row Name 11/06/21 1625       Muscle Loss    Loss of Muscle Mass Findings Moderate    [place] Region Moderate - slight  depression    Clavicle Bone Region Moderate - some protrusion in females, visible in males    Dorsal Hand Region Moderate - slight depression    Row Name 11/06/21 1625       Fat Loss    Subcutaneous Fat Loss Findings Moderate    Orbital Region  Moderate -  somewhat hollowness, slightly dark circles    Row Name 11/06/21 1625       Fluid Accumulation (Edema)    Fluid Acumulation Findings Mild    Fluid Accumulation  Mild equals 1+ pitting edema    Row Name 11/06/21 1625       Declining Functional Status    Declining Functional Status Findings Measurably Reduced    Row Name 11/06/21 1625       Criteria Met (Must meet criteria for severity in at least 2 of these categories: M Wasting, Fat Loss, Fluid, Secondary Signs, Wt. Status, Intake)    Patient meets criteria for  Severe Malnutrition                Electronically signed by:  Samira Romero RDN, LD  11/06/21 16:28 CDT

## 2021-11-06 NOTE — PLAN OF CARE
Goal Outcome Evaluation:   Patient alert and oriented x3, periods of intermittent confusion, reorients well. VSS. Monitoring H&H, Hgb 7.2 today. Patient has been type and screened, prepare order placed and blood consent on the chart. Repeat labs in the morning. Patient pain is not well controlled. She is able to sleep through it. Scheduled ultram dose increased today, first dose given this evening, will monitor changes in pain. No changes in sensation and movement of bilateral lower extremities. Therapy working with patient, patient did not want to attempt moving out of bed today. IV ABT continues. Plan to discharge to rehab facility when medically stable. Plan of care continues.

## 2021-11-06 NOTE — PLAN OF CARE
Goal Outcome Evaluation:           Progress: declining  Outcome Summary: Malnutrition assessment completed. See RDN progress notes for details and recommendations. Will continue to follow.

## 2021-11-07 PROCEDURE — 97110 THERAPEUTIC EXERCISES: CPT

## 2021-11-07 PROCEDURE — 25010000002 CEFAZOLIN PER 500 MG: Performed by: NEUROLOGICAL SURGERY

## 2021-11-07 PROCEDURE — 97530 THERAPEUTIC ACTIVITIES: CPT

## 2021-11-07 RX ADMIN — SUCRALFATE 1 G: 1 TABLET ORAL at 11:31

## 2021-11-07 RX ADMIN — TRAMADOL HYDROCHLORIDE 100 MG: 50 TABLET ORAL at 21:21

## 2021-11-07 RX ADMIN — ISOSORBIDE MONONITRATE 60 MG: 60 TABLET, EXTENDED RELEASE ORAL at 08:54

## 2021-11-07 RX ADMIN — SUCRALFATE 1 G: 1 TABLET ORAL at 07:57

## 2021-11-07 RX ADMIN — ISOSORBIDE MONONITRATE 60 MG: 60 TABLET, EXTENDED RELEASE ORAL at 21:22

## 2021-11-07 RX ADMIN — MAGNESIUM GLUCONATE 500 MG ORAL TABLET 400 MG: 500 TABLET ORAL at 08:54

## 2021-11-07 RX ADMIN — FLUTICASONE PROPIONATE 1 SPRAY: 50 SPRAY, METERED NASAL at 21:21

## 2021-11-07 RX ADMIN — CEFAZOLIN 2 G: 10 INJECTION, POWDER, FOR SOLUTION INTRAVENOUS at 07:57

## 2021-11-07 RX ADMIN — PANTOPRAZOLE SODIUM 40 MG: 40 TABLET, DELAYED RELEASE ORAL at 08:58

## 2021-11-07 RX ADMIN — POLYETHYLENE GLYCOL 3350 17 G: 17 POWDER, FOR SOLUTION ORAL at 08:57

## 2021-11-07 RX ADMIN — CARVEDILOL 25 MG: 25 TABLET, FILM COATED ORAL at 07:57

## 2021-11-07 RX ADMIN — ACETAMINOPHEN 650 MG: 325 TABLET, FILM COATED ORAL at 23:44

## 2021-11-07 RX ADMIN — DOCUSATE SODIUM 100 MG: 100 CAPSULE, LIQUID FILLED ORAL at 21:21

## 2021-11-07 RX ADMIN — FLUTICASONE PROPIONATE 1 SPRAY: 50 SPRAY, METERED NASAL at 08:54

## 2021-11-07 RX ADMIN — FERROUS SULFATE TAB 325 MG (65 MG ELEMENTAL FE) 325 MG: 325 (65 FE) TAB at 07:57

## 2021-11-07 RX ADMIN — POTASSIUM CHLORIDE 20 MEQ: 10 CAPSULE, COATED, EXTENDED RELEASE ORAL at 21:21

## 2021-11-07 RX ADMIN — POTASSIUM CHLORIDE 20 MEQ: 10 CAPSULE, COATED, EXTENDED RELEASE ORAL at 08:53

## 2021-11-07 RX ADMIN — CEFAZOLIN 2 G: 10 INJECTION, POWDER, FOR SOLUTION INTRAVENOUS at 23:00

## 2021-11-07 RX ADMIN — SUCRALFATE 1 G: 1 TABLET ORAL at 21:21

## 2021-11-07 RX ADMIN — FUROSEMIDE 40 MG: 40 TABLET ORAL at 08:54

## 2021-11-07 RX ADMIN — DOCUSATE SODIUM 50 MG AND SENNOSIDES 8.6 MG 2 TABLET: 8.6; 5 TABLET, FILM COATED ORAL at 21:21

## 2021-11-07 RX ADMIN — TRAMADOL HYDROCHLORIDE 100 MG: 50 TABLET ORAL at 08:53

## 2021-11-07 RX ADMIN — FOLIC ACID 1 MG: 1 TABLET ORAL at 08:54

## 2021-11-07 RX ADMIN — HYDROCODONE BITARTRATE AND ACETAMINOPHEN 1 TABLET: 10; 325 TABLET ORAL at 11:31

## 2021-11-07 RX ADMIN — SODIUM CHLORIDE, PRESERVATIVE FREE 10 ML: 5 INJECTION INTRAVENOUS at 21:21

## 2021-11-07 RX ADMIN — DOCUSATE SODIUM 100 MG: 100 CAPSULE, LIQUID FILLED ORAL at 08:54

## 2021-11-07 RX ADMIN — SUCRALFATE 1 G: 1 TABLET ORAL at 18:00

## 2021-11-07 RX ADMIN — TRAMADOL HYDROCHLORIDE 100 MG: 50 TABLET ORAL at 18:00

## 2021-11-07 RX ADMIN — CARVEDILOL 25 MG: 25 TABLET, FILM COATED ORAL at 18:00

## 2021-11-07 RX ADMIN — LEVOTHYROXINE SODIUM 75 MCG: 75 TABLET ORAL at 05:11

## 2021-11-07 RX ADMIN — VALACYCLOVIR HYDROCHLORIDE 500 MG: 500 TABLET, FILM COATED ORAL at 08:53

## 2021-11-07 RX ADMIN — Medication 250 MG: at 08:53

## 2021-11-07 RX ADMIN — THIAMINE HCL TAB 100 MG 250 MG: 100 TAB at 08:54

## 2021-11-07 RX ADMIN — CEFAZOLIN 2 G: 10 INJECTION, POWDER, FOR SOLUTION INTRAVENOUS at 18:00

## 2021-11-07 RX ADMIN — DOCUSATE SODIUM 50 MG AND SENNOSIDES 8.6 MG 2 TABLET: 8.6; 5 TABLET, FILM COATED ORAL at 08:54

## 2021-11-07 NOTE — THERAPY TREATMENT NOTE
Acute Care - Physical Therapy Treatment Note  [facility]     Patient Name: Tawny Shin  : 1955  MRN: [id number]  Today's Date: 2021      Visit Dx:     ICD-10-CM ICD-9-CM   1. Impaired mobility  Z74.09 799.89   2. Impaired mobility and ADLs  Z74.09 V49.89    Z78.9    3. Dysphagia, unspecified type  R13.10 787.20     Patient Active Problem List   Diagnosis   • T12 compression fracture, initial encounter (Formerly [facility])   • Chronic embolism and thrombosis of unspecified deep veins of left lower extremity ([facility])   • Urinary tract infection without hematuria   • Acute bilateral thoracic back pain   • Chronic anticoagulation   • Hypokalemia   • Transaminitis   • Iron deficiency anemia   • Osteoporosis   • Bacteremia   • Moderate malnutrition (CMS/[facility]ly [facility])   • Epidural hematoma ([facility])     Past Medical History:   Diagnosis Date   • Age-related osteoporosis with current pathological fracture 2020   • Arthritis    • Asthma    • Bilateral bunions 2020   • Cancer ([facility])    • Cardiac pacemaker syndrome 2020    Overview:  - heart block - implanted    • Charcot's joint of foot, left 2020   • Chronic deep vein thrombosis (DVT) of right lower extremity ([facility]ly [facility]) 2021   • Chronic pain syndrome 2021   • Chronic sinusitis    • COPD (chronic obstructive pulmonary disease) ([facility])    • Coronary artery disease    • 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   • Elevated cholesterol    • Eustachian tube dysfunction    • Heart disease    • Herpes simplex    • History of transfusion    • Hyperlipidemia    • Hypertension    • Hypothyroidism 2020   • Intrinsic asthma 2020   • Knee dislocation    • Labral tear of right hip joint    • Laryngitis sicca    • Laryngitis, chronic    • Left carotid bruit 3/9/2016   • MI (myocardial infarction) ([facility])    • Myalgia due to statin 2019   • Open wound of right hip 2021   • Osteomyelitis of right femur ([facility]) 2021   • Otorrhea    • Pacemaker  "11/17/2016   • Primary osteoarthritis of left knee 12/23/2020   • Psoriasis vulgaris 12/23/2020   • S/P coronary artery stent placement 3/9/2016   • Sensorineural hearing loss    • Seropositive rheumatoid arthritis of multiple sites (HCC) 12/27/2019    Overview:  -myochrysine '93-'96 -methotrexate '96--->11/98;r/s  restarted 2/99--> 8/14 (anemia) -sulfasalazine- not effective -penicillamine 6/98-->10/98; no effect -leflunomide 11/98--> - Humira '13-->didn't take - Enbrel 12/14-->3/15- no effect!   Last Assessment & Plan:  - \"aching all over\" because she had to be off her anti-rheumatic drugs for 2 weeks in preparation for her R knee surgery - he   • Sick sinus syndrome ([facility]) 12/27/2019   • Sjogren's disease ([facility])    • Spondylolisthesis of lumbar region 1/17/2018   • Syncope, recurrent 2/8/2021     Past Surgical History:   Procedure Laterality Date   • A-V CARDIAC PACEMAKER INSERTION  2016   • ATRIAL CARDIAC PACEMAKER INSERTION     • CARDIAC CATHETERIZATION     • CATARACT EXTRACTION     • CERVICAL CORPECTOMY N/A 3/3/2021    Procedure: CERVICAL 6 CORPECTOMY WITH TITANIUM CAGE WITH NEURO MONITORING;  Surgeon: Bandar Shea MD;  Location: [facility] OR;  Service: Neurosurgery;  Laterality: N/A;   • COLONOSCOPY  11/08/2011    One fold in the ascending colon which showed ulcer otherwise normal exam   • COLONOSCOPY  11/12/2004    Normal exam repeat in five years   • CORONARY ANGIOPLASTY WITH STENT PLACEMENT      X 2; 2013 & 2014   • ENDOSCOPY  07/10/2014    Normal exam   • FLAP LEG Right 9/14/2021    Procedure: RIGHT GLUTEAL FASCIOCUTANEOUS ADVANCEMENT FLAP AND RIGHT TENSOR FASCIAL JESSICA FLAP;  Surgeon: Amadeo Turner MD;  Location:  [facility] OR;  Service: Plastics;  Laterality: Right;   • HIP ABDUCTION TENOTOMY BILATERAL Right 1/14/2021    Procedure: RIGHT HIP GLUTEUS MEDLUS / MINIMUS REPAIR, POSSIBLE ACHILLES ALLOGRAFT;  Surgeon: Nino Carlson MD;  Location:  [facility] OR;  Service: Orthopedics;  Laterality: Right;   • " INCISION AND DRAINAGE HIP Right 2/9/2021    Procedure: HIP INCISION AND DRAINAGE;  Surgeon: Nino Carlson MD;  Location:  [facility] OR;  Service: Orthopedics;  Laterality: Right;   • INCISION AND DRAINAGE LEG Right 10/24/2021    Procedure: INCISION AND DRAINAGE LOWER EXTREMITY;  Surgeon: Amadeo Turner MD;  Location:  [facility] OR;  Service: Plastics;  Laterality: Right;   • INCISION AND DRAINAGE OF WOUND Right 7/8/2021    Procedure: INCISION AND DRAINAGE WOUND RIGHT HIP;  Surgeon: James Huntley MD;  Location:  [facility] OR;  Service: Orthopedics;  Laterality: Right;   • JOINT REPLACEMENT     • KYPHOPLASTY WITH BIOPSY Bilateral 10/26/2021    Procedure: THOARCIC 12 KYPHOPLASTY WITH BIOPSY;  Surgeon: Bandar Shea MD;  Location:  [facility] OR;  Service: Neurosurgery;  Laterality: Bilateral;   • LEG DEBRIDEMENT Right 9/14/2021    Procedure: DEBRIDEMENT OF RIGHT HIP WOUND, RIGHT GLUTEAL FASCIOCUTANEOUS ADVANCEMENT FLAP AND RIGHT TENSOR FASCIAL JESSICA FLAP;  Surgeon: Amadeo Turner MD;  Location:  [facility] OR;  Service: Plastics;  Laterality: Right;   • LUMBAR DISCECTOMY Right 3/23/2021    Procedure: LUMBAR DISCECTOMY MICRO, Lumbar 1/2 right;  Surgeon: Bandar Shea MD;  Location:  [facility] OR;  Service: Neurosurgery;  Laterality: Right;   • LUMBAR FUSION N/A 1/19/2018    Procedure: L3-4,L4-5 DECOMPRESSION, POSTERIOR SPINAL FUSION WITH INSTRUMENTATION;  Surgeon: Fortino Oropeza MD;  Location:  [facility] OR;  Service:    • LUMBAR LAMINECTOMY WITH FUSION Left 1/17/2018    Procedure: LEFT L3-4 L4-5 LATERAL LUMBAR INTERBODY FUSION;  Surgeon: Fortino Oropeza MD;  Location:  [facility] OR;  Service:    • MYRINGOTOMY W/ TUBES  09/04/2014    TUBES NO LONGER IN PLACE   • OTHER SURGICAL HISTORY      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 so hardware was removed and spacers were placed   • REPLACEMENT TOTAL KNEE Right      PT Assessment (last 12 hours)     PT Evaluation and Treatment     Row Name 11/07/21 0943          Physical  Therapy Time and Intention    Subjective Information complains of; pain; weakness; fatigue  -     Document Type therapy note (daily note)  -     Mode of Treatment physical therapy  -[facility] Name 11/07/21 0943          General Information    Existing Precautions/Restrictions fall; spinal; brace worn when out of bed  TLSO  -     Barriers to Rehab medically complex  -[facility] Name 11/07/21 0943          Pain Scale: Numbers Pre/Post-Treatment    Pretreatment Pain Rating 8/10  -     Posttreatment Pain Rating 8/10  -     Pain Location - Side Right  -     Pain Location hip; back  -[facility] Name 11/07/21 0943          Pain Scale: Word Pre/Post-Treatment    Pain Intervention(s) Medication (See MAR); Repositioned  -[facility] Name 11/07/21 0943          Bed Mobility    Sidelying-Sit Boaz (Bed Mobility) maximum assist (25% patient effort); 2 person assist; verbal cues  -     Sit-Sidelying Boaz (Bed Mobility) maximum assist (25% patient effort); 2 person assist; verbal cues  -     Comment (Bed Mobility) pt sat EOB 5 minutes cga-min assist to maintain sitting balance, pt unable to tolerate sitting due to pain R hip and back  -[facility] Name 11/07/21 0943          Aerobic Exercise    Comment, Aerobic Exercise (Therapeutic Exercise) P-AAROM BLE mostly PROM, LLE somewhat stronger than RLE  -AH     Row Name             Wound 10/24/21 0819 Right hip Incision    Wound - Properties Group Placement Date: 10/24/21  -DT Placement Time: 0819  -DT Present on Hospital Admission: N  -DT Side: Right  -DT Location: hip  -DT Primary Wound Type: Incision  -DT     Retired Wound - Properties Group Date first assessed: 10/24/21  -DT Time first assessed: 0819  -DT Present on Hospital Admission: N  -DT Side: Right  -DT Location: hip  -DT Primary Wound Type: Incision  -DT     [facility] Name             Wound 11/07/21 0416 Bilateral coccyx    Wound - Properties Group Placement Date: 11/07/21  -LF Placement Time: 0416   -LF Present on Hospital Admission: N  -LF Side: Bilateral  -LF Location: coccyx  -LF     Retired Wound - Properties Group Date first assessed: 11/07/21  -LF Time first assessed: 0416  -LF Present on Hospital Admission: N  -LF Side: Bilateral  -LF Location: coccyx  -LF     Row Name 11/07/21 0943          Plan of Care Review    Plan of Care Reviewed With patient  -AH     Progress no change  -     Outcome Summary pt recieved P-AAROM BLE mostly PROM, LLE is a little stronger than the RLE, pt trans to EOB max of 2, sat EOB 5minutes cga-min to maintain sitting balance, pt unable to tolerate sitting EOB due to R hip and back pain, trans back to bed max of 2, pt would benefit from rehab  -     Row Name 11/07/21 0943          Positioning and Restraints    Pre-Treatment Position in bed  -     Post Treatment Position bed  -     In Bed supine; call light within reach; encouraged to call for assist; with [facility]  -           User Key  (r) = Recorded By, (t) = Taken By, (c) = Cosigned By    Initials Name Provider Type     Olga Chambers, PTA Physical Therapy Assistant    Jean Carlos Aburto RN Registered Nurse    So Robison RN Registered Nurse              Physical Therapy Education                 Title: PT OT SLP Therapies (In Progress)     Topic: Physical Therapy (In Progress)     Point: Mobility training (Done)     Learning Progress Summary           Patient Acceptance, E, VU,NR by  at 11/4/2021 1509    Comment: Educated pt on spinal precautions, proper body mechanics during bed mobility transfers, POC, and d/c.                   Point: Home exercise program (Not Started)     Learner Progress:  Not documented in this visit.          Point: Body mechanics (Done)     Learning Progress Summary           Patient Acceptance, E, VU,NR by  at 11/4/2021 1509    Comment: Educated pt on spinal precautions, proper body mechanics during bed mobility transfers, POC, and d/c.                   Point: Precautions  (Done)     Learning Progress Summary           Patient Acceptance, E,TB, VU by  at 11/7/2021 1027    Comment: positioning    Acceptance, E, VU,NR by  at 11/4/2021 1509    Comment: Educated pt on spinal precautions, proper body mechanics during bed mobility transfers, POC, and d/c.                               User Key     Initials Effective Dates Name Provider Type Discipline     06/16/21 -  Olga Chambers, PTA Physical Therapy Assistant PT     09/07/21 -  Juliana Angela, PT Student PT Student PT              PT Recommendation and Plan     Plan of Care Reviewed With: patient  Progress: no change  Outcome Summary: pt recieved P-AAROM BLE mostly PROM, LLE is a little stronger than the RLE, pt trans to EOB max of 2, sat EOB 5minutes cga-min to maintain sitting balance, pt unable to tolerate sitting EOB due to R hip and back pain, trans back to bed max of 2, pt would benefit from rehab   Outcome Measures     Row Name 11/06/21 1300             How much help from another is currently needed...    Putting on and taking off regular lower body clothing? 1  -LS      Bathing (including washing, rinsing, and drying) 2  -LS      Toileting (which includes using toilet bed pan or urinal) 1  -LS      Putting on and taking off regular upper body clothing 2  -LS      Taking care of personal grooming (such as brushing teeth) 3  -LS      Eating meals 3  -LS      AM-PAC 6 Clicks Score (OT) 12  -              Functional Assessment    Outcome Measure Options AM-PAC 6 Clicks Daily Activity (OT)  -            User Key  (r) = Recorded By, (t) = Taken By, (c) = Cosigned By    Initials Name Provider Type    LS Tara Guerrero COTA Occupational Therapy Assistant                 Time Calculation:    PT Charges     Row Name 11/07/21 1027             Time Calculation    Start Time 0943  -      Stop Time 1021  -      Time Calculation (min) 38 min  -      PT Received On 11/07/21  -              Time Calculation- PT    Total  Timed Code Minutes- PT 38 minute(s)  -AH              Timed Charges    68350 - PT Therapeutic Exercise Minutes 23  -AH      91734 - PT Therapeutic Activity Minutes 15  -AH              Total Minutes    Timed Charges Total Minutes 38  -AH       Total Minutes 38  -AH            User Key  (r) = Recorded By, (t) = Taken By, (c) = Cosigned By    Initials Name Provider Type     Olga Chambers PTA Physical Therapy Assistant              Therapy Charges for Today     Code Description Service Date Service Provider Modifiers Qty    14140247007 HC PT THER PROC EA 15 MIN 11/6/2021 Olga Chambers, SERENA GP 2    14981994130 HC PT THER PROC EA 15 MIN 11/7/2021 Olga Chambers, SERENA GP 2    11116742546 HC PT THERAPEUTIC ACT EA 15 MIN 11/7/2021 Olga Chambers, SERENA GP 1          PT G-Codes  Outcome Measure Options: AM-PAC 6 Clicks Daily Activity (OT)  AM-PAC 6 Clicks Score (PT): 10  AM-PAC 6 Clicks Score (OT): 12    Olga Chambers PTA  11/7/2021

## 2021-11-07 NOTE — PLAN OF CARE
Goal Outcome Evaluation:         Patient continues to have no movement of bilateral lower legs, although she is able to move her ankles and feet. Plantar and dorsiflexion is week. Pain continues in lower back and right hip. Scheduled medications and PRN medications starting to have some effect, patient pain has been at its lowest today at a 5. Patient bottom and groin are excoriated and peeling. Patient turned and positioned Q2H with barrier cream applied. Mepilex sacral dressing applied. Patient appetite fair. Patient does not want medications crushed in applesauce. When patient is swallowing she holds fluids in her mouth and has to concentrate in order to swallow, no cough noted. Patient did sit at bedside for a brief few minutes in the afternoon. Plan to discharge to rehab when medically able.  Plan of care continues.

## 2021-11-07 NOTE — PLAN OF CARE
Goal Outcome Evaluation:  Plan of Care Reviewed With: patient        Progress: no change  Outcome Summary: pt recieved P-AAROM BLE mostly PROM, LLE is a little stronger than the RLE, pt trans to EOB max of 2, sat EOB 5minutes cga-min to maintain sitting balance, pt unable to tolerate sitting EOB due to R hip and back pain, trans back to bed max of 2, pt would benefit from rehab

## 2021-11-07 NOTE — CASE MANAGEMENT/SOCIAL WORK
Continued Stay Note  [facility]     Patient Name: Tawny Shin  MRN: [id number]  Today's Date: 11/7/2021    Admit Date: 11/4/2021     Discharge Plan     Row Name 11/07/21 1156       Plan    Plan SNF Referrals    Provided Post Acute Provider List? Yes    Post Acute Provider List Nursing Home    Provided Post Acute Provider Quality & Resource List? Yes    Post Acute Provider Quality and Resource List Nursing Home    Delivered To Patient    Patient/Family in Agreement with Plan yes    Plan Comments Rec'd a referral that pt will need rehab. Noted that pt is unable to go to [facility]. Reviewed therapy notes as well. Spoke with pt about rehab options and she requests referrals in [place] to [facility], [facility], and [facility]. Referrals sent.               Discharge Codes    No documentation.                     BRIAN Anderson

## 2021-11-07 NOTE — DISCHARGE PLACEMENT REQUEST
"Laura Shin (66 y.o. Female)             Date of Birth Social Security Number Address Home Phone MRN    1955  [address] [address] [phone] [id number]    Religious Marital Status             Jain        Admission Date Admission Type Admitting Provider Attending Provider Department, Room/Bed    11/4/21 Urgent Bandar Shea MD [place], William Lee, MD [facility] 3A, 349/1    Discharge Date Discharge Disposition Discharge Destination                         Attending Provider: Bandar Shea MD    Allergies: Atorvastatin, Amoxicillin, Escitalopram, Nabumetone, Niacin Er, Penicillins, Simvastatin    Isolation: None   Infection: None   Code Status: CPR   Advance Care Planning Activity    Ht: 167.6 cm (66\")   Wt: 71.6 kg (157 lb 14.4 oz)    Admission Cmt: None   Principal Problem: None                Active Insurance as of 11/4/2021     Primary Coverage     Payor Plan Insurance Group Employer/Plan Group    MEDICARE MEDICARE A & B      Payor Plan Address Payor Plan Phone Number Payor Plan Fax Number Effective Dates    [address] [phone]  3/1/2014 - None Entered    [facility] [address]       Subscriber Name Subscriber Birth Date Member ID       LAURA SHIN 1955 [id number]           Secondary Coverage     Payor Plan Insurance Group Employer/Plan Group    [employer] SUP AAR HEALTH CARE OPTIONS      Payor Plan Address Payor Plan Phone Number Payor Plan Fax Number Effective Dates    [facility] [phone]  1/1/2019 - None Entered    [address]       [facility] [address]       Subscriber Name Subscriber Birth Date Member ID       LAURA SHIN 1955 [id number]                 Emergency Contacts      (Rel.) Home Phone Work Phone Mobile Phone    Gee Shin (Son) -- -- [phone]    XavierSkye woodson (Sister) -- -- [phone]        "

## 2021-11-07 NOTE — PLAN OF CARE
Goal Outcome Evaluation:               A&Ox4.  Confused.  Dressing rt hip CDI.  Refusing most turns.  Refused SCD.  Refusal in chart.  RA/.  Hypotensive overnight.  Dr. coburn. Pacemaker. Horan in place.  FC complete.  Midline dressing CDI and capped.  No neuro changes.  Meds crushed in applesauce.  Resting well.Pressure injury to bottom.  Wound consult.   Will continue to monitor.

## 2021-11-08 LAB
BH BB BLOOD EXPIRATION DATE: NORMAL
BH BB BLOOD EXPIRATION DATE: NORMAL
BH BB BLOOD TYPE BARCODE: 6200
BH BB BLOOD TYPE BARCODE: 6200
BH BB DISPENSE STATUS: NORMAL
BH BB DISPENSE STATUS: NORMAL
BH BB PRODUCT CODE: NORMAL
BH BB PRODUCT CODE: NORMAL
BH BB UNIT NUMBER: NORMAL
BH BB UNIT NUMBER: NORMAL
CROSSMATCH INTERPRETATION: NORMAL
CROSSMATCH INTERPRETATION: NORMAL
UNIT  ABO: NORMAL
UNIT  ABO: NORMAL
UNIT  RH: NORMAL
UNIT  RH: NORMAL

## 2021-11-08 PROCEDURE — 25010000002 CEFAZOLIN PER 500 MG: Performed by: NEUROLOGICAL SURGERY

## 2021-11-08 PROCEDURE — 97110 THERAPEUTIC EXERCISES: CPT

## 2021-11-08 PROCEDURE — 97530 THERAPEUTIC ACTIVITIES: CPT

## 2021-11-08 PROCEDURE — 97535 SELF CARE MNGMENT TRAINING: CPT

## 2021-11-08 RX ADMIN — SUCRALFATE 1 G: 1 TABLET ORAL at 16:54

## 2021-11-08 RX ADMIN — FUROSEMIDE 40 MG: 40 TABLET ORAL at 08:13

## 2021-11-08 RX ADMIN — HYDROCODONE BITARTRATE AND ACETAMINOPHEN 1 TABLET: 7.5; 325 TABLET ORAL at 08:25

## 2021-11-08 RX ADMIN — TRAMADOL HYDROCHLORIDE 100 MG: 50 TABLET ORAL at 08:24

## 2021-11-08 RX ADMIN — SUCRALFATE 1 G: 1 TABLET ORAL at 20:13

## 2021-11-08 RX ADMIN — ACETAMINOPHEN 650 MG: 325 TABLET, FILM COATED ORAL at 17:01

## 2021-11-08 RX ADMIN — PANTOPRAZOLE SODIUM 40 MG: 40 TABLET, DELAYED RELEASE ORAL at 08:24

## 2021-11-08 RX ADMIN — Medication 250 MG: at 08:14

## 2021-11-08 RX ADMIN — THIAMINE HCL TAB 100 MG 250 MG: 100 TAB at 08:24

## 2021-11-08 RX ADMIN — VALACYCLOVIR HYDROCHLORIDE 500 MG: 500 TABLET, FILM COATED ORAL at 08:13

## 2021-11-08 RX ADMIN — HYDROCODONE BITARTRATE AND ACETAMINOPHEN 1 TABLET: 7.5; 325 TABLET ORAL at 18:25

## 2021-11-08 RX ADMIN — CARVEDILOL 25 MG: 25 TABLET, FILM COATED ORAL at 08:14

## 2021-11-08 RX ADMIN — FOLIC ACID 1 MG: 1 TABLET ORAL at 08:14

## 2021-11-08 RX ADMIN — DOCUSATE SODIUM 100 MG: 100 CAPSULE, LIQUID FILLED ORAL at 20:13

## 2021-11-08 RX ADMIN — LEVOTHYROXINE SODIUM 75 MCG: 75 TABLET ORAL at 05:00

## 2021-11-08 RX ADMIN — ISOSORBIDE MONONITRATE 60 MG: 60 TABLET, EXTENDED RELEASE ORAL at 08:14

## 2021-11-08 RX ADMIN — POTASSIUM CHLORIDE 20 MEQ: 10 CAPSULE, COATED, EXTENDED RELEASE ORAL at 20:12

## 2021-11-08 RX ADMIN — TRAMADOL HYDROCHLORIDE 100 MG: 50 TABLET ORAL at 16:53

## 2021-11-08 RX ADMIN — CEFAZOLIN 2 G: 10 INJECTION, POWDER, FOR SOLUTION INTRAVENOUS at 08:24

## 2021-11-08 RX ADMIN — SODIUM CHLORIDE, PRESERVATIVE FREE 10 ML: 5 INJECTION INTRAVENOUS at 08:26

## 2021-11-08 RX ADMIN — POLYETHYLENE GLYCOL 3350 17 G: 17 POWDER, FOR SOLUTION ORAL at 08:15

## 2021-11-08 RX ADMIN — CEFAZOLIN 2 G: 10 INJECTION, POWDER, FOR SOLUTION INTRAVENOUS at 16:55

## 2021-11-08 RX ADMIN — FERROUS SULFATE TAB 325 MG (65 MG ELEMENTAL FE) 325 MG: 325 (65 FE) TAB at 08:14

## 2021-11-08 RX ADMIN — DOCUSATE SODIUM 50 MG AND SENNOSIDES 8.6 MG 2 TABLET: 8.6; 5 TABLET, FILM COATED ORAL at 20:13

## 2021-11-08 RX ADMIN — SUCRALFATE 1 G: 1 TABLET ORAL at 08:14

## 2021-11-08 RX ADMIN — ISOSORBIDE MONONITRATE 60 MG: 60 TABLET, EXTENDED RELEASE ORAL at 20:13

## 2021-11-08 RX ADMIN — FLUTICASONE PROPIONATE 1 SPRAY: 50 SPRAY, METERED NASAL at 08:26

## 2021-11-08 RX ADMIN — MAGNESIUM GLUCONATE 500 MG ORAL TABLET 400 MG: 500 TABLET ORAL at 08:13

## 2021-11-08 RX ADMIN — SUCRALFATE 1 G: 1 TABLET ORAL at 12:13

## 2021-11-08 RX ADMIN — TRAMADOL HYDROCHLORIDE 100 MG: 50 TABLET ORAL at 20:17

## 2021-11-08 RX ADMIN — CARVEDILOL 25 MG: 25 TABLET, FILM COATED ORAL at 17:01

## 2021-11-08 RX ADMIN — DOCUSATE SODIUM 50 MG AND SENNOSIDES 8.6 MG 2 TABLET: 8.6; 5 TABLET, FILM COATED ORAL at 08:14

## 2021-11-08 RX ADMIN — FLUTICASONE PROPIONATE 1 SPRAY: 50 SPRAY, METERED NASAL at 20:16

## 2021-11-08 RX ADMIN — DOCUSATE SODIUM 100 MG: 100 CAPSULE, LIQUID FILLED ORAL at 08:14

## 2021-11-08 RX ADMIN — HYDROCODONE BITARTRATE AND ACETAMINOPHEN 1 TABLET: 7.5; 325 TABLET ORAL at 14:33

## 2021-11-08 RX ADMIN — SODIUM CHLORIDE, PRESERVATIVE FREE 10 ML: 5 INJECTION INTRAVENOUS at 20:17

## 2021-11-08 RX ADMIN — CEFAZOLIN 2 G: 10 INJECTION, POWDER, FOR SOLUTION INTRAVENOUS at 23:12

## 2021-11-08 RX ADMIN — POTASSIUM CHLORIDE 20 MEQ: 10 CAPSULE, COATED, EXTENDED RELEASE ORAL at 08:14

## 2021-11-08 NOTE — CASE MANAGEMENT/SOCIAL WORK
Continued Stay Note   [facility]     Patient Name: Tawny Shin  MRN: [id number]  Today's Date: 11/8/2021    Admit Date: 11/4/2021     Discharge Plan     Row Name 11/08/21 1015       Plan    Plan Pt request referrals to Spinal Cord centers- vs SNF    Patient/Family in Agreement with Plan yes    Plan Comments Called to inform pt that [facility] has offered her a bed and that [facility] saying she only has 1 medicare day remaining and whether she would be private pay vs Medicaid. Asked if she wanted someone to call her re: asset allowance etc. and she was unsure.  She is going to discuss with family and wants us to get back with her. She is also requesting referrals to spinal cord facilities in both [place] and [place]. Not sure she will qualify but will give referrals to see.  Will refer to [facility] and [facility]. Will follow.    Pt has bed offers from [facility], [facility], and [facility].     [facility] faxed referral intake form for SW to fill out, this completed and faxed back to facility.                Discharge Codes    No documentation.                     BRIAN Muñoz

## 2021-11-08 NOTE — THERAPY TREATMENT NOTE
Patient Name: Tawny Shin  : 1955    MRN: [id number]                              Today's Date: 2021       Admit Date: 2021    Visit Dx:     ICD-10-CM ICD-9-CM   1. Impaired mobility  Z74.09 799.89   2. Impaired mobility and ADLs  Z74.09 V49.89    Z78.9    3. Dysphagia, unspecified type  R13.10 787.20     Patient Active Problem List   Diagnosis   • T12 compression fracture, initial encounter ([facility])   • Chronic embolism and thrombosis of unspecified deep veins of left lower extremity ([facility])   • Urinary tract infection without hematuria   • Acute bilateral thoracic back pain   • Chronic anticoagulation   • Hypokalemia   • Transaminitis   • Iron deficiency anemia   • Osteoporosis   • Bacteremia   • Moderate malnutrition (CMS/[facility])   • Epidural hematoma ([facility])     Past Medical History:   Diagnosis Date   • Age-related osteoporosis with current pathological fracture 2020   • Arthritis    • Asthma    • Bilateral bunions 2020   • Cancer ([facility])    • Cardiac pacemaker syndrome 2020    Overview:  - heart block - implanted    • Charcot's joint of foot, left 2020   • Chronic deep vein thrombosis (DVT) of right lower extremity ([facility]) 2021   • Chronic pain syndrome 2021   • Chronic sinusitis    • COPD (chronic obstructive pulmonary disease) ([facility])    • Coronary artery disease    • 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   • Elevated cholesterol    • Eustachian tube dysfunction    • Heart disease    • Herpes simplex    • History of transfusion    • Hyperlipidemia    • Hypertension    • Hypothyroidism 2020   • Intrinsic asthma 2020   • Knee dislocation    • Labral tear of right hip joint    • Laryngitis sicca    • Laryngitis, chronic    • Left carotid bruit 3/9/2016   • MI (myocardial infarction) ([facility])    • Myalgia due to statin 2019   • Open wound of right hip 2021   • Osteomyelitis of right femur ([facility]) 2021   • Otorrhea    • Pacemaker 2016  "  • Primary osteoarthritis of left knee 12/23/2020   • Psoriasis vulgaris 12/23/2020   • S/P coronary artery stent placement 3/9/2016   • Sensorineural hearing loss    • Seropositive rheumatoid arthritis of multiple sites (HCC) 12/27/2019    Overview:  -myochrysine '93-'96 -methotrexate '96--->11/98;r/s  restarted 2/99--> 8/14 (anemia) -sulfasalazine- not effective -penicillamine 6/98-->10/98; no effect -leflunomide 11/98--> - Humira '13-->didn't take - Enbrel 12/14-->3/15- no effect!   Last Assessment & Plan:  - \"aching all over\" because she had to be off her anti-rheumatic drugs for 2 weeks in preparation for her R knee surgery - he   • Sick sinus syndrome (HCC) 12/27/2019   • Sjogren's disease ([facility])    • Spondylolisthesis of lumbar region 1/17/2018   • Syncope, recurrent 2/8/2021     Past Surgical History:   Procedure Laterality Date   • A-V CARDIAC PACEMAKER INSERTION  2016   • ATRIAL CARDIAC PACEMAKER INSERTION     • CARDIAC CATHETERIZATION     • CATARACT EXTRACTION     • CERVICAL CORPECTOMY N/A 3/3/2021    Procedure: CERVICAL 6 CORPECTOMY WITH TITANIUM CAGE WITH NEURO MONITORING;  Surgeon: Bandar Shea MD;  Location: [facility] OR;  Service: Neurosurgery;  Laterality: N/A;   • COLONOSCOPY  11/08/2011    One fold in the ascending colon which showed ulcer otherwise normal exam   • COLONOSCOPY  11/12/2004    Normal exam repeat in five years   • CORONARY ANGIOPLASTY WITH STENT PLACEMENT      X 2; 2013 & 2014   • ENDOSCOPY  07/10/2014    Normal exam   • FLAP LEG Right 9/14/2021    Procedure: RIGHT GLUTEAL FASCIOCUTANEOUS ADVANCEMENT FLAP AND RIGHT TENSOR FASCIAL JESSICA FLAP;  Surgeon: Amadeo Turner MD;  Location:  [facility] OR;  Service: Plastics;  Laterality: Right;   • HIP ABDUCTION TENOTOMY BILATERAL Right 1/14/2021    Procedure: RIGHT HIP GLUTEUS MEDLUS / MINIMUS REPAIR, POSSIBLE ACHILLES ALLOGRAFT;  Surgeon: Nino Carlson MD;  Location:  [facility] OR;  Service: Orthopedics;  Laterality: Right;   • INCISION " AND DRAINAGE HIP Right 2/9/2021    Procedure: HIP INCISION AND DRAINAGE;  Surgeon: Nino Carlson MD;  Location:  [facility] OR;  Service: Orthopedics;  Laterality: Right;   • INCISION AND DRAINAGE LEG Right 10/24/2021    Procedure: INCISION AND DRAINAGE LOWER EXTREMITY;  Surgeon: Amadeo Turner MD;  Location:  [facility] OR;  Service: Plastics;  Laterality: Right;   • INCISION AND DRAINAGE OF WOUND Right 7/8/2021    Procedure: INCISION AND DRAINAGE WOUND RIGHT HIP;  Surgeon: James Huntley MD;  Location:  [facility] OR;  Service: Orthopedics;  Laterality: Right;   • JOINT REPLACEMENT     • KYPHOPLASTY WITH BIOPSY Bilateral 10/26/2021    Procedure: THOARCIC 12 KYPHOPLASTY WITH BIOPSY;  Surgeon: Bandar Shea MD;  Location:  [facility] OR;  Service: Neurosurgery;  Laterality: Bilateral;   • LEG DEBRIDEMENT Right 9/14/2021    Procedure: DEBRIDEMENT OF RIGHT HIP WOUND, RIGHT GLUTEAL FASCIOCUTANEOUS ADVANCEMENT FLAP AND RIGHT TENSOR FASCIAL JESSICA FLAP;  Surgeon: Amadeo Truner MD;  Location:  [facility] OR;  Service: Plastics;  Laterality: Right;   • LUMBAR DISCECTOMY Right 3/23/2021    Procedure: LUMBAR DISCECTOMY MICRO, Lumbar 1/2 right;  Surgeon: Bandar Shea MD;  Location:  [facility];  Service: Neurosurgery;  Laterality: Right;   • LUMBAR FUSION N/A 1/19/2018    Procedure: L3-4,L4-5 DECOMPRESSION, POSTERIOR SPINAL FUSION WITH INSTRUMENTATION;  Surgeon: Fortino Oropeza MD;  Location:  [facility];  Service:    • LUMBAR LAMINECTOMY WITH FUSION Left 1/17/2018    Procedure: LEFT L3-4 L4-5 LATERAL LUMBAR INTERBODY FUSION;  Surgeon: Fortino Oropeza MD;  Location:  [facility] OR;  Service:    • MYRINGOTOMY W/ TUBES  09/04/2014    TUBES NO LONGER IN PLACE   • OTHER SURGICAL HISTORY      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 so hardware was removed and spacers were placed   • REPLACEMENT TOTAL KNEE Right       General Information     Row Name 11/08/21 1318          OT Time and Intention    Document Type therapy note (daily  note) (P)   -PARMINDER     Mode of Treatment occupational therapy (P)   -     Row Name 11/08/21 1318          General Information    Patient Profile Reviewed yes (P)   -PARMINDER     Existing Precautions/Restrictions fall; spinal; brace worn when out of bed (P)   TLSO  -     Row Name 11/08/21 1318          Cognition    Orientation Status (Cognition) oriented x 4 (P)   -     Row Name 11/08/21 1318          Safety Issues, Functional Mobility    Safety Issues Affecting Function (Mobility) insight into deficits/self-awareness; friction/shear risk (P)   -PARMINDER     Impairments Affecting Function (Mobility) balance; endurance/activity tolerance; pain; range of motion (ROM); sensation/sensory awareness; strength (P)   -PARMINDER           User Key  (r) = Recorded By, (t) = Taken By, (c) = Cosigned By    Initials Name Provider Type    Ruel Doe, OT Student OT Student                 Mobility/ADL's     Row Name 11/08/21 1318          Bed Mobility    Bed Mobility rolling left; rolling right (P)   -PARMINDER     Rolling Left Bay (Bed Mobility) verbal cues; minimum assist (75% patient effort) (P)   -PARMINDER     Rolling Right Bay (Bed Mobility) verbal cues; minimum assist (75% patient effort) (P)   -PARMINDER     Comment (Bed Mobility) pt refused to sit EOB due to R hip pain. pt agreeable to oral care and exercises (P)   -     Row Name 11/08/21 1318          Activities of Daily Living    BADL Assessment/Intervention grooming (P)   -     Row Name 11/08/21 1318          Grooming Assessment/Training    [place] (Grooming) grooming skills; oral care regimen; wash face, hands; set up (P)   -     Position (Grooming) sitting up in bed (P)   -PARMINDER           User Key  (r) = Recorded By, (t) = Taken By, (c) = Cosigned By    Initials Name Provider Type    Ruel Doe, OT Student OT Student               Obj/Interventions     Row Name 11/08/21 1318          Shoulder (Therapeutic Exercise)    Shoulder (Therapeutic Exercise) AROM  (active range of motion) (P)   -     Shoulder AROM (Therapeutic Exercise) bilateral; flexion; extension; aBduction; aDduction; horizontal aBduction/aDduction; sitting; 10 repetitions; 3 sets (P)   -[facility] Name 11/08/21 1318          Elbow/Forearm (Therapeutic Exercise)    Elbow/Forearm (Therapeutic Exercise) AROM (active range of motion) (P)   -     Elbow/Forearm AROM (Therapeutic Exercise) bilateral; flexion; extension; supination; pronation; sitting; 10 repetitions; 3 sets (P)   -[facility] Name 11/08/21 1318          Wrist (Therapeutic Exercise)    Wrist (Therapeutic Exercise) AROM (active range of motion) (P)   -     Wrist AROM (Therapeutic Exercise) bilateral; flexion; extension; 10 repetitions; 3 sets (P)   -[facility] Name 11/08/21 1318          Hand (Therapeutic Exercise)    Hand (Therapeutic Exercise) AROM (active range of motion) (P)   -     Hand AROM/AAROM (Therapeutic Exercise) bilateral; AROM (active range of motion); finger flexion; finger extension; finger aBduction; finger aDduction; 10 repetitions; 3 sets (P)   -[facility] Name 11/08/21 1318          Motor Skills    Motor Skills therapeutic exercise (P)   -[facility] Name 11/08/21 1318          Therapeutic Exercise    Therapeutic Exercise shoulder; elbow/forearm; wrist; hand (P)   -           User Key  (r) = Recorded By, (t) = Taken By, (c) = Cosigned By    Initials Name Provider Type    Ruel Doe OT Student OT Student               Goals/Plan    No documentation.                Clinical Impression     Row Name 11/08/21 1318          Pain Scale: Numbers Pre/Post-Treatment    Pretreatment Pain Rating 8/10 (P)   -     Posttreatment Pain Rating 8/10 (P)   -     Pain Location - Side Right (P)   -     Pain Location hip (P)   -     Pain Intervention(s) Rest; Repositioned; Nursing Notified; Ambulation/increased activity (P)   -     Row Name 11/08/21 1318          Plan of Care Review    Plan of Care Reviewed With patient  (P)   -PARMINDER     Progress no change (P)   -PARMINDER     Outcome Summary OT tx completed today. A&Ox4. Pt c/o 8/10 pain in her R hip. Pt completed rolling R and L with min A and verbal cues due to pain. pt refused to sit EOB due to R hip pain. pt agreeable to oral care and exercises. Pt completed grooming skills with set up. Pt completed B UE AROM in all planes and at all joints 10 reps x3. pt would benefit from skilled OT services to address strength, balance, and activity tolerance while performing ADLs. OT recommends SNF upon d/c. (P)   -PARMINDER     Row Name 11/08/21 1318          Therapy Plan Review/Discharge Plan (OT)    Anticipated Discharge Disposition (OT) skilled nursing facility (P)   -     Row Name 11/08/21 1318          Vital Signs    O2 Delivery Pre Treatment room air (P)   -PARMINDER     O2 Delivery Intra Treatment room air (P)   -PARMINDER     O2 Delivery Post Treatment room air (P)   -PARMINDER     Pre Patient Position Supine (P)   -PARMINDER     Intra Patient Position Supine (P)   -PARMINDER     Post Patient Position Supine (P)   -     Row Name 11/08/21 1318          Positioning and Restraints    Pre-Treatment Position in bed (P)   -PARMINDER     Post Treatment Position bed (P)   -PARMINDER     In Bed notified nsg; fowlers; side lying left; call light within reach; encouraged to call for assist; with family/caregiver; side rails up x2; pillow between legs (P)   -PARMINDER           User Key  (r) = Recorded By, (t) = Taken By, (c) = Cosigned By    Initials Name Provider Type    Ruel Doe, OT Student OT Student               Outcome Measures     Row Name 11/08/21 1310          How much help from another is currently needed...    Putting on and taking off regular lower body clothing? 1 (P)   -PARMINDER     Bathing (including washing, rinsing, and drying) 2 (P)   -PARMINDER     Toileting (which includes using toilet bed pan or urinal) 2 (P)   -PARMINDER     Putting on and taking off regular upper body clothing 2 (P)   -PARMINDER     Taking care of personal grooming (such as brushing  teeth) 3 (P)   -PARMINDER     Eating meals 3 (P)   -PARMINDER     AM-PAC 6 Clicks Score (OT) 13 (P)   -PARMINDER     Row Name 11/08/21 1318          Functional Assessment    Outcome Measure Options AM-PAC 6 Clicks Daily Activity (OT) (P)   -           User Key  (r) = Recorded By, (t) = Taken By, (c) = Cosigned By    Initials Name Provider Type    Ruel Doe OT Student OT Student                Occupational Therapy Education                 Title: PT OT SLP Therapies (In Progress)     Topic: Occupational Therapy (In Progress)     Point: ADL training (Done)     Description:   Instruct learner(s) on proper safety adaptation and remediation techniques during self care or transfers.   Instruct in proper use of assistive devices.              Learning Progress Summary           Patient Acceptance, E, VU by PARMINDER at 11/8/2021 1447    Acceptance, E,D, NR by MARCI at 11/5/2021 1204    Acceptance, E,D, NR by DESHAWN at 11/4/2021 1545   Family Acceptance, E, VU by PARMINDER at 11/8/2021 1447                   Point: Home exercise program (In Progress)     Description:   Instruct learner(s) on appropriate technique for monitoring, assisting and/or progressing therapeutic exercises/activities.              Learning Progress Summary           Patient Acceptance, E,D, NR by DESHAWN at 11/4/2021 1545                   Point: Precautions (Done)     Description:   Instruct learner(s) on prescribed precautions during self-care and functional transfers.              Learning Progress Summary           Patient Acceptance, E, VU by PARMINDER at 11/8/2021 1447    Acceptance, E,D, NR by DESHAWN at 11/4/2021 1545   Family Acceptance, E, VU by PARMINDER at 11/8/2021 1447                   Point: Body mechanics (In Progress)     Description:   Instruct learner(s) on proper positioning and spine alignment during self-care, functional mobility activities and/or exercises.              Learning Progress Summary           Patient Acceptance, E,D, NR by MARCI at 11/5/2021 1204    Acceptance, E,D, NR  by  at 11/4/2021 1545                               User Key     Initials Effective Dates Name Provider Type Discipline     08/28/18 -  Adeola Payne, OTR/L Occupational Therapist OT    MARCI 08/04/21 -  Ashley Wagner OT Student OT Student OT    PARMINDER 08/04/21 -  Ruel Rios OT Student OT Student OT              OT Recommendation and Plan     Plan of Care Review  Plan of Care Reviewed With: (P) patient  Progress: (P) no change  Outcome Summary: (P) OT tx completed today. A&Ox4. Pt c/o 8/10 pain in her R hip. Pt completed rolling R and L with min A and verbal cues due to pain. pt refused to sit EOB due to R hip pain. pt agreeable to oral care and exercises. Pt completed grooming skills with set up. Pt completed B UE AROM in all planes and at all joints 10 reps x3. pt would benefit from skilled OT services to address strength, balance, and activity tolerance while performing ADLs. OT recommends SNF upon d/c.     Time Calculation:    Time Calculation- OT     Row Name 11/08/21 1318             Time Calculation- OT    OT Start Time 1318 (P)   -PARMINDER      OT Stop Time 1347 (P)   -      OT Time Calculation (min) 29 min (P)   -      Total Timed Code Minutes- OT 29 minute(s) (P)   -      OT Received On 11/08/21 (P)   -            User Key  (r) = Recorded By, (t) = Taken By, (c) = Cosigned By    Initials Name Provider Type    Ruel Doe OT Student OT Student                       LINDSAY Lima  11/8/2021

## 2021-11-08 NOTE — PLAN OF CARE
Goal Outcome Evaluation:  Plan of Care Reviewed With: patient        Progress: improving  Outcome Summary: Pt was premedicated for tx.  Able to perform sidelying to sitting with mod assist using the bed rail and with HOB elevated.  Sitting EOB, pt was able to tolerate for 10 minutes with UE support and CGA.  While sitting EOB, pt could actively perform ankle pumps BLE, LAQ on LLE.  In fowlers performed PROM to BLE for full ROM, pt was able to assist with heel slides and hip add.  Will continue to work with pt to increase strength and sitting tolerance to progress to being able to perform sliding board t/f.

## 2021-11-08 NOTE — PROGRESS NOTES
[facility]   Progress Note    Patient Name: Tawny Shin  : 1955  MRN: [id number]  Primary Care Physician:  Davon Urrutia MD  Date of admission: 2021    Subjective   Subjective     Chief Complaint: Lower extremity weakness    History of Present Illness  Patient Reports No right hip pain.  Coccygeal pain.  Discouragement regarding prognosis and ambulation.  Poor appetite.      Objective   Objective     Vitals:   Temp:  [97.6 °F (36.4 °C)-98.2 °F (36.8 °C)] 97.6 °F (36.4 °C)  Heart Rate:  [63-70] 67  Resp:  [16-18] 16  BP: (107-132)/(50-81) 132/56    Physical Exam:   The right hip dressing was removed.  1 inch Nu Gauze in place as ordered.  Adjacent skin is healthy appearing.  No erythema.  No induration.  Scant serous drainage on overlying ABD.  No underlying fluctuance.  No tenderness to palpation.    Result Review    Result Review:  I have personally reviewed the results from the time of this admission to 2021 08:09 CST and agree with these findings:  []  Laboratory  []  Microbiology  []  Radiology  []  EKG/Telemetry   []  Cardiology/Vascular   []  Pathology  []  Old records  []  Other:  Most notable findings include:     Assessment/Plan   Assessment / Plan     Brief Patient Summary:  Tawny Shin is a 66 y.o. female who Now hospital day 5 of her readmission after developing gradual onset bilateral lower extremity weakness.  At her previous hospitalization, I performed an I&D of a right hip seroma.  I have been examining her operative site and managing wound care.  I do not believe any additional intervention is warranted at this point beyond continued local wound care.    I had a long discussion with the patient today regarding other aspects of her care that need to be carefully monitored moving forward.  I discuss with her the high risk of developing pressure wounds in a context of minimal or no ambulation. We discussed how the best treatment for pressure wounds is prevention in the  significant challenges involved with addressing these wounds once they have been developed.  I reiterated the importance of frequent position changes.    Active Hospital Problems:  Active Hospital Problems    Diagnosis    • Epidural hematoma (HCC)        DVT prophylaxis:  Medical and mechanical DVT prophylaxis orders are present.    CODE STATUS:    Level Of Support Discussed With: Patient  Code Status (Patient has no pulse and is not breathing): CPR (Attempt to Resuscitate)  Medical Interventions (Patient has pulse or is breathing): Full Support      Plan:   1.  Continue local wound care of her right hip.  No plan for additional I&D at present  2.  Continue diligent repositioning and skin barrier use.          Electronically signed by Amadeo Turner MD, 11/08/21, 8:09 AM CST.

## 2021-11-08 NOTE — PLAN OF CARE
Goal Outcome Evaluation:            A&Ox4.  Confused at times.  Turning.  Incontinent bowel.  Horan in place. FC complete.  Zguard to bottom.  Mepalex over coccyx area.  Bottom red and peeling.  C/O medicated with PO prn Tylenol with good results.   Midline dressing CDI, capped.  Abx infused as ordered.  Movement in ankles and feet.  RA/.  Meds crushed in applesauce.  Pt is able to take meds whole that cannot be crushed.  Delayed swallowing with thin liquids.  Baseline.  Neuro checks no change. Dressing to right hip changed as ordered.  Resting well between care.  Pt. Prefers lights on at all times.  Will continue to monitor.

## 2021-11-08 NOTE — PLAN OF CARE
Problem: Adult Inpatient Plan of Care  Goal: Plan of Care Review  Outcome: Ongoing, Progressing  Flowsheets (Taken 11/8/2021 0830)  Plan of Care Reviewed With: (RNClarissa) other (see comments)  Outcome Summary: Spoke with RNClarissa, regarding new order placed due to pt desiring whole, instead of crushed medications. Educated RN that given that pt showed no significant pharyngeal concerns with the mechanical soft consistency during SLP initial eval, pt is allowed to have meds provided whole in pudding/applesauce. RN stated pt consumed meds crushed in applesauce this AM without c/o dislike in method of medication management. Continue current diet of mechanical soft diet consistency with regular/thin liquid consistency. ST to continue to monitor PRN. Thanks!

## 2021-11-08 NOTE — PLAN OF CARE
Goal Outcome Evaluation:              Outcome Summary: (P) A&Ox4. VSS. RA. . Pt has red but blanchable area on coccyx/sacral area. Barrier cream and mepalex dressing reapplied. CDI. Pt has been turned q 2 hours unless refused. Wedges and pillows in use. R hip dressing CDI. R upper extremity midline CDI. Bladder scans q 4. Voiding/incontinent of urine and stool. Pt complains of numbness in lower extremities bilaterally. Believes this is due to positioning. Pt complains of pain which has been treated with pain medication as ordered. Safety maintained. Call light within reach.   Continues to refuse SCDs despite education.

## 2021-11-08 NOTE — THERAPY TREATMENT NOTE
Acute Care - Physical Therapy Treatment Note  [facility]     Patient Name: Tawny Shin  : 1955  MRN: [id number]  Today's Date: 2021      Visit Dx:     ICD-10-CM ICD-9-CM   1. Impaired mobility  Z74.09 799.89   2. Impaired mobility and ADLs  Z74.09 V49.89    Z78.9    3. Dysphagia, unspecified type  R13.10 787.20     Patient Active Problem List   Diagnosis   • T12 compression fracture, initial encounter ([facility])   • Chronic embolism and thrombosis of unspecified deep veins of left lower extremity ([facility])   • Urinary tract infection without hematuria   • Acute bilateral thoracic back pain   • Chronic anticoagulation   • Hypokalemia   • Transaminitis   • Iron deficiency anemia   • Osteoporosis   • Bacteremia   • Moderate malnutrition (CMS/[facility])   • Epidural hematoma ([facility])     Past Medical History:   Diagnosis Date   • Age-related osteoporosis with current pathological fracture 2020   • Arthritis    • Asthma    • Bilateral bunions 2020   • Cancer ([facility])    • Cardiac pacemaker syndrome 2020    Overview:  - heart block - implanted    • Charcot's joint of foot, left 2020   • Chronic deep vein thrombosis (DVT) of right lower extremity ([facility]) 2021   • Chronic pain syndrome 2021   • Chronic sinusitis    • COPD (chronic obstructive pulmonary disease) ([facility])    • Coronary artery disease    • 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   • Elevated cholesterol    • Eustachian tube dysfunction    • Heart disease    • Herpes simplex    • History of transfusion    • Hyperlipidemia    • Hypertension    • Hypothyroidism 2020   • Intrinsic asthma 2020   • Knee dislocation    • Labral tear of right hip joint    • Laryngitis sicca    • Laryngitis, chronic    • Left carotid bruit 3/9/2016   • MI (myocardial infarction) ([facility])    • Myalgia due to statin 2019   • Open wound of right hip 2021   • Osteomyelitis of right femur ([facility]) 2021   • Otorrhea    • Pacemaker  "11/17/2016   • Primary osteoarthritis of left knee 12/23/2020   • Psoriasis vulgaris 12/23/2020   • S/P coronary artery stent placement 3/9/2016   • Sensorineural hearing loss    • Seropositive rheumatoid arthritis of multiple sites (HCC) 12/27/2019    Overview:  -myochrysine '93-'96 -methotrexate '96--->11/98;r/s  restarted 2/99--> 8/14 (anemia) -sulfasalazine- not effective -penicillamine 6/98-->10/98; no effect -leflunomide 11/98--> - Humira '13-->didn't take - Enbrel 12/14-->3/15- no effect!   Last Assessment & Plan:  - \"aching all over\" because she had to be off her anti-rheumatic drugs for 2 weeks in preparation for her R knee surgery - he   • Sick sinus syndrome ([facility]) 12/27/2019   • Sjogren's disease ([facility])    • Spondylolisthesis of lumbar region 1/17/2018   • Syncope, recurrent 2/8/2021     Past Surgical History:   Procedure Laterality Date   • A-V CARDIAC PACEMAKER INSERTION  2016   • ATRIAL CARDIAC PACEMAKER INSERTION     • CARDIAC CATHETERIZATION     • CATARACT EXTRACTION     • CERVICAL CORPECTOMY N/A 3/3/2021    Procedure: CERVICAL 6 CORPECTOMY WITH TITANIUM CAGE WITH NEURO MONITORING;  Surgeon: Bandar Shea MD;  Location: [facility] OR;  Service: Neurosurgery;  Laterality: N/A;   • COLONOSCOPY  11/08/2011    One fold in the ascending colon which showed ulcer otherwise normal exam   • COLONOSCOPY  11/12/2004    Normal exam repeat in five years   • CORONARY ANGIOPLASTY WITH STENT PLACEMENT      X 2; 2013 & 2014   • ENDOSCOPY  07/10/2014    Normal exam   • FLAP LEG Right 9/14/2021    Procedure: RIGHT GLUTEAL FASCIOCUTANEOUS ADVANCEMENT FLAP AND RIGHT TENSOR FASCIAL JESSICA FLAP;  Surgeon: Amadeo Turner MD;  Location:  [facility] OR;  Service: Plastics;  Laterality: Right;   • HIP ABDUCTION TENOTOMY BILATERAL Right 1/14/2021    Procedure: RIGHT HIP GLUTEUS MEDLUS / MINIMUS REPAIR, POSSIBLE ACHILLES ALLOGRAFT;  Surgeon: Nino Carlson MD;  Location:  [facility] OR;  Service: Orthopedics;  Laterality: Right;   • " INCISION AND DRAINAGE HIP Right 2/9/2021    Procedure: HIP INCISION AND DRAINAGE;  Surgeon: Nino Carlson MD;  Location:  [facility] OR;  Service: Orthopedics;  Laterality: Right;   • INCISION AND DRAINAGE LEG Right 10/24/2021    Procedure: INCISION AND DRAINAGE LOWER EXTREMITY;  Surgeon: Amadeo Turner MD;  Location:  [facility] OR;  Service: Plastics;  Laterality: Right;   • INCISION AND DRAINAGE OF WOUND Right 7/8/2021    Procedure: INCISION AND DRAINAGE WOUND RIGHT HIP;  Surgeon: James Huntley MD;  Location:  [facility] OR;  Service: Orthopedics;  Laterality: Right;   • JOINT REPLACEMENT     • KYPHOPLASTY WITH BIOPSY Bilateral 10/26/2021    Procedure: THOARCIC 12 KYPHOPLASTY WITH BIOPSY;  Surgeon: Bandar Shea MD;  Location:  [facility] OR;  Service: Neurosurgery;  Laterality: Bilateral;   • LEG DEBRIDEMENT Right 9/14/2021    Procedure: DEBRIDEMENT OF RIGHT HIP WOUND, RIGHT GLUTEAL FASCIOCUTANEOUS ADVANCEMENT FLAP AND RIGHT TENSOR FASCIAL JESSICA FLAP;  Surgeon: Amadeo Turner MD;  Location:  [facility] OR;  Service: Plastics;  Laterality: Right;   • LUMBAR DISCECTOMY Right 3/23/2021    Procedure: LUMBAR DISCECTOMY MICRO, Lumbar 1/2 right;  Surgeon: Bandar Shea MD;  Location:  [facility] OR;  Service: Neurosurgery;  Laterality: Right;   • LUMBAR FUSION N/A 1/19/2018    Procedure: L3-4,L4-5 DECOMPRESSION, POSTERIOR SPINAL FUSION WITH INSTRUMENTATION;  Surgeon: Fortino Oropeza MD;  Location:  [facility] OR;  Service:    • LUMBAR LAMINECTOMY WITH FUSION Left 1/17/2018    Procedure: LEFT L3-4 L4-5 LATERAL LUMBAR INTERBODY FUSION;  Surgeon: Fortino Oropeza MD;  Location:  [facility] OR;  Service:    • MYRINGOTOMY W/ TUBES  09/04/2014    TUBES NO LONGER IN PLACE   • OTHER SURGICAL HISTORY      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 so hardware was removed and spacers were placed   • REPLACEMENT TOTAL KNEE Right      PT Assessment (last 12 hours)     PT Evaluation and Treatment     Row Name 11/08/21 1512 11/08/21 0937        Physical Therapy Time and Intention    Subjective Information complains of; weakness; pain; fatigue  -KINGS complains of; weakness; fatigue; pain  -KINGS    Document Type therapy note (daily note)  -KINGS therapy note (daily note)  -KINGS    Mode of Treatment physical therapy  -KINGS physical therapy  -KINGS    Comment TLSO  -KINGS TLSO  -KINGS    Row Name 11/08/21 1512 11/08/21 0925       General Information    Existing Precautions/Restrictions fall; spinal; brace worn when out of bed  TLSO  -KINGS fall; spinal; brace worn when out of bed  TLSO  -KINGS    Row Name 11/08/21 1512 11/08/21 0925       Pain Scale: Numbers Pre/Post-Treatment    Pretreatment Pain Rating 5/10  -KINGS 5/10  -KINGS    Posttreatment Pain Rating 7/10  -KINGS 7/10  -KINGS    Pain Location back  -KINGS back  -KINGS    Pre/Posttreatment Pain Comment -- pt stated pain was more centralized to her back today, not in LE's  -KINGS    Row Name 11/08/21 1512 11/08/21 0925       Bed Mobility    Sidelying-Sit [place] (Bed Mobility) verbal cues; moderate assist (50% patient effort)  -KINGS verbal cues; moderate assist (50% patient effort)  -KINGS    Sit-Sidelying [place] (Bed Mobility) verbal cues; moderate assist (50% patient effort); maximum assist (25% patient effort); 2 person assist  -KINGS verbal cues; moderate assist (50% patient effort); 2 person assist  -KINGS    Assistive Device (Bed Mobility) bed rails; draw sheet; head of bed elevated  -KINGS --    Row Name 11/08/21 0925          Transfers    Comment (Transfers) not attempted due to weakness,  will progress with sliding board t/f  -KINGS     Row Name 11/08/21 1512 11/08/21 0925       Balance    Comment, Balance sitting EOB, pt did not tolerate as well this afternoon and had immediate pain in low back and requested to lay back down  -KINGS sitting EOB, pt was able to tolerate for 10 minutes with UE support and CGA  -KINGS    Row Name 11/08/21 1512 11/08/21 0925       Aerobic Exercise    Comment, Aerobic Exercise (Therapeutic Exercise) in fowlers, PROM/BEV  BLE.  Able to assist with ankle pumps, and had slight contractions in BLE with heel slides and hip abd/add  -KINGS PROM/AAROM BLE in fowlers.  Pt is able to actively perform ankle pumps BLE, LAQ on LLE, and AAROM for heel slide on BLE  -KINGS    Row Name             Wound 10/24/21 0819 Right hip Incision    Wound - Properties Group Placement Date: 10/24/21  -DT Placement Time: 0819  -DT Present on Hospital Admission: N  -DT Side: Right  -DT Location: hip  -DT Primary Wound Type: Incision  -DT     Retired Wound - Properties Group Date first assessed: 10/24/21  -DT Time first assessed: 0819  -DT Present on Hospital Admission: N  -DT Side: Right  -DT Location: hip  -DT Primary Wound Type: Incision  -DT     Row Name             Wound 11/07/21 0416 Bilateral coccyx    Wound - Properties Group Placement Date: 11/07/21  -LF Placement Time: 0416  -LF Present on Hospital Admission: N  -LF Side: Bilateral  -LF Location: coccyx  -LF     Retired Wound - Properties Group Date first assessed: 11/07/21  -LF Time first assessed: 0416  -LF Present on Hospital Admission: N  -LF Side: Bilateral  -LF Location: coccyx  -LF     Row Name             [REMOVED] Wound 10/26/21 1900 Other (See comments) upper lip Skin Tear    Wound - Properties Group Placement Date: 10/26/21  -SP Placement Time: 1900  -SP Side: Other (See comments)  -SP, mid upper  Orientation: upper  -SP Location: lip  -SP Primary Wound Type: Skin tear  -SP Removal Date: 11/08/21  -LFA (r) MW (t) LFA (c) Removal Time: 1151  -LFA (r) MW (t) LFA (c) Wound Outcome: Healed  -LFA (r) MW (t) LFA (c)     Retired Wound - Properties Group Date first assessed: 10/26/21  -SP Time first assessed: 1900  -SP Side: Other (See comments)  -SP, mid upper  Location: lip  -SP Primary Wound Type: Skin tear  -SP Resolution Date: 11/08/21  -LFA (r) MW (t) LFA (c) Resolution Time: 1151  -LFA (r) MW (t) LFA (c) Wound Outcome: Healed  -LFA (r) MW (t) LFA (c)     Row Name 11/08/21 1512 11/08/21 0925        Positioning and Restraints    Pre-Treatment Position in bed  -KINGS in bed  -KINGS    Post Treatment Position bed  -KINGS bed  -KINGS    In Bed side lying right; call light within reach; encouraged to call for assist; side rails up x2  -KINGS side lying left; call light within reach; encouraged to call for assist; with family/caregiver; side rails up x2  -KINGS          User Key  (r) = Recorded By, (t) = Taken By, (c) = Cosigned By    Initials Name Provider Type    Garcia Jaramillo PTA Physical Therapy Assistant    Jean Carlos Aburto RN Registered Nurse    Clarissa Hwang RN Registered Nurse    So Robison RN Registered Nurse    Finesse Cool, RN Registered Nurse    Aravind [place], Nursing Student Nursing Student              Physical Therapy Education                 Title: PT OT SLP Therapies (In Progress)     Topic: Physical Therapy (In Progress)     Point: Mobility training (Done)     Learning Progress Summary           Patient Acceptance, E, VU,NR by  at 11/4/2021 1509    Comment: Educated pt on spinal precautions, proper body mechanics during bed mobility transfers, POC, and d/c.                   Point: Home exercise program (Not Started)     Learner Progress:  Not documented in this visit.          Point: Body mechanics (Done)     Learning Progress Summary           Patient Acceptance, E, VU,NR by  at 11/4/2021 1509    Comment: Educated pt on spinal precautions, proper body mechanics during bed mobility transfers, POC, and d/c.                   Point: Precautions (Done)     Learning Progress Summary           Patient Acceptance, E,TB, VU by  at 11/7/2021 1027    Comment: positioning    Acceptance, E, VU,NR by  at 11/4/2021 1509    Comment: Educated pt on spinal precautions, proper body mechanics during bed mobility transfers, POC, and d/c.                               User Key     Initials Effective Dates Name Provider Type [facility] 06/16/21 -  Olga Chambers PTA Physical  Therapy Assistant PT     09/07/21 -  Juliana Angela, PT Student PT Student PT              PT Recommendation and Plan     Plan of Care Reviewed With: patient  Progress: improving  Outcome Summary: Pt was premedicated for tx.  Able to perform sidelying to sitting with mod assist using the bed rail and with HOB elevated.  Sitting EOB, pt was able to tolerate for 10 minutes with UE support and CGA.  While sitting EOB, pt could actively perform ankle pumps BLE, LAQ on LLE.  In fowlers performed PROM to BLE for full ROM, pt was able to assist with heel slides and hip add.  Will continue to work with pt to increase strength and sitting tolerance to progress to being able to perform sliding board t/f.   Outcome Measures     Row Name 11/06/21 1300             How much help from another is currently needed...    Putting on and taking off regular lower body clothing? 1  -LS      Bathing (including washing, rinsing, and drying) 2  -LS      Toileting (which includes using toilet bed pan or urinal) 1  -LS      Putting on and taking off regular upper body clothing 2  -LS      Taking care of personal grooming (such as brushing teeth) 3  -LS      Eating meals 3  -LS      AM-PAC 6 Clicks Score (OT) 12  -LS              Functional Assessment    Outcome Measure Options AM-PAC 6 Clicks Daily Activity (OT)  -LS            User Key  (r) = Recorded By, (t) = Taken By, (c) = Cosigned By    Initials Name Provider Type    Tara Rodriguez COTA Occupational Therapy Assistant                 Time Calculation:    PT Charges     Row Name 11/08/21 1512 11/08/21 0925          Time Calculation    Start Time 1512  -KINGS 0925  -KINGS     Stop Time 1540  -KINGS 0957  -KINGS     Time Calculation (min) 28 min  -KINGS 32 min  -KINGS     PT Received On 11/08/21  -KINGS 11/08/21  -KINGS            Time Calculation- PT    Total Timed Code Minutes- PT 28 minute(s)  -KINGS 32 minute(s)  -KINGS            Timed Charges    56847 - PT Therapeutic Exercise Minutes 18  -KINGS 16  -KINGS      36803 - PT Therapeutic Activity Minutes 10  -KINGS 16  -KINGS            Total Minutes    Timed Charges Total Minutes 28  -KINGS 32  -KINGS      Total Minutes 28  -KINGS 32  -KINGS           User Key  (r) = Recorded By, (t) = Taken By, (c) = Cosigned By    Initials Name Provider Type    Garcia Jaramillo PTA Physical Therapy Assistant              Therapy Charges for Today     Code Description Service Date Service Provider Modifiers Qty    29030615395 HC PT THERAPEUTIC ACT EA 15 MIN 11/8/2021 Garcia Francis, PTA GP 1    76660780966 HC PT THER PROC EA 15 MIN 11/8/2021 Garcia Francis, PTA GP 1    15527076500 HC PT THERAPEUTIC ACT EA 15 MIN 11/8/2021 Garcia Francis, PTA GP 1    77383620567 HC PT THER PROC EA 15 MIN 11/8/2021 Garcia Francis, PTA GP 1          PT G-Codes  Outcome Measure Options: AM-PAC 6 Clicks Daily Activity (OT)  AM-PAC 6 Clicks Score (PT): 10  AM-PAC 6 Clicks Score (OT): 13    Garcia Francis PTA  11/8/2021

## 2021-11-08 NOTE — DISCHARGE PLACEMENT REQUEST
"Polly Hooks [phone]  Laura Shin (66 y.o. Female)             Date of Birth Social Security Number Address Home Phone MRN    1955  [id number] [facility] [address] [phone] [id number]    [employer] Marital Status             Cheondoism        Admission Date Admission Type Admitting Provider Attending Provider Department, Room/Bed    11/4/21 Urgent Bandar Shea MD T[place], William Lee, MD [facility] 3A, 349/1    Discharge Date Discharge Disposition Discharge Destination                         Attending Provider: Bandar Shea MD    Allergies: Atorvastatin, Amoxicillin, Escitalopram, Nabumetone, Niacin Er, Penicillins, Simvastatin    Isolation: None   Infection: None   Code Status: CPR   Advance Care Planning Activity    Ht: 167.6 cm (66\")   Wt: 71.6 kg (157 lb 14.4 oz)    Admission Cmt: None   Principal Problem: None                Active Insurance as of 11/4/2021     Primary Coverage     Payor Plan Insurance Group Employer/Plan Group    MEDICARE MEDICARE A & B      Payor Plan Address Payor Plan Phone Number Payor Plan Fax Number Effective Dates    [address] [phone]  3/1/2014 - None Entered    formerly [facility] [address]       Subscriber Name Subscriber Birth Date Member ID       LAURA SHIN 1955 [id number]           Secondary Coverage     Payor Plan Insurance Group Employer/Plan Group    [employer] SUP AAR HEALTH CARE OPTIONS      Payor Plan Address Payor Plan Phone Number Payor Plan Fax Number Effective Dates    [facility] [phone]  1/1/2019 - None Entered    [address]       [facility] [address]       Subscriber Name Subscriber Birth Date Member ID       LAURA SHIN 1955 [id number]                 Emergency Contacts      (Rel.) Home Phone Work Phone Mobile Phone    Gee Shin (Son) -- -- [phone]    Skye Retana (Sister) -- -- [phone]               History & Physical      Shabbir, " Bandar Vizcaino MD at 11/04/21 0645          NEUROSURGERY INITIAL HOSPITAL ENCOUNTER    Assessment/Plan:   Tawny Shin is a 66 y.o. female with a significant medical history of prior L3-5 posterior lumbar fusion (Dr. Oropeza 2018), MSSA osteomyelitis of right femur (7/6/2021), C6 corpectomy (3/3/2021), L1-2 osteomyelitis/discitis requiring L1 microdiscectomy (3/23/2021), and T12 kyphoplasty (10/26/2021), rheumatoid arthritis on disease modifying agents, osteoporosis, Sjogren's disease, MI, CAD with stenting, DVT right lower extremity currently on Xarelto and ASA (as of 11/4/2021), hypertension, COPD, hyperlipidemia, hypothyroidism, and she is overweight.  She presents from rehab with L1-3 numbness and pain and worsening proximal leg weakness over last 4-5 days.  Physical exam findings of bright and awake, oriented x3, chronic bilateral  strength weakness, worsening left and right IP (1/5) and quadricep weakness (3/5), BLE (4-/5), gross hyperreflexia and bilateral Babinski's.  Her imaging shows anterior epidural hematoma associated with her T12 compression fracture.      Differential Diagnosis:   Anterior epidural hematoma  T12 compression fracture status post kyphoplasty 10/26/2021  Known history of osteomyelitis L1/2  Prior instrumented fusion L3-5    Medical Decision Making:  Tawny presents with a subacute onset of bilateral lower extremity weakness for the last 4 to 5 days.  Up until last night she was still on Xarelto and aspirin.  Imaging shows an acute to subacute anterior epidural hematoma with cord compression.  Fortunately she appears to have preservation of bilateral dorsiflexion plantarflexion.    Differential diagnosis includes hematoma secondary to T12 fracture versus hematoma associated with kyphoplasty on 10/26 versus expansion of infection from L1/2.  At this time I would favor the former.  She recovered well from her kyphoplasty and then was restarted on her anticoagulation.  Anterior epidural  hematoma is associated with compression fractures and osteoporotic spines on anticoagulation is not unheard of but is a rare occurrence.  Regardless at this time she is at least 72 hours past the onset of weakness, she has preservation of the lower motor groups, and she is on appropriately indicated nonreversible anticoagulant and nonreversible platelet inhibitor.      Based on these factors I would favor conservative management.  She needs at least 5 days to be fully coagulated.  In this time we will monitor her closely for neurological improvement.    Situation is additionally complicated by rigid construct from L3-L5 with a compromised anterior column with prior osteomyelitis at L1-2, posterior hemilaminectomy at L1/2, and T12 compression fracture.  Any treatment of the anterior epidural hematoma would require a T11-L2 hemilaminectomy at a minimum.  This would result in further destabilization of the spine.  Given that she is more than 72 hours past neurological decline I think the benefit from decompression would be minimal and would include greater than normal risk favoring conservative management at this time.    Recommendations:  -Admit to the floor  -Hold Xarelto and aspirin  -Every 4 hours neurochecks  -Physical therapy and Occupational Therapy as tolerated  -Horan for urinary retention identified on MRI      I discussed the patients findings and my recommendations with patient, family and consulting provider    Thank you very much for this interesting consult.     Level of Risk: High due to:  severe exacerbation of chronic illness, illness with threat to life or bodily function and abrupt change in neurological status  MDM: High  Mod = 10802, Qdef=89147  ___________________________________________________________________    Reason for consult: Acute onset of proximal leg weakness  Consult Requested by: Dr. Reynaldo Austin inpatient rehab    Chief Complaint: Proximal leg pain and leg weakness    HPI: Tawny MARQUES  Kip is a 66 y.o. female with a significant medical history of prior L3-5 posterior lumbar fusion (Dr. Oropeza 2018), MSSA osteomyelitis of right femur (7/6/2021), C6 corpectomy (3/3/2021), L1-2 osteomyelitis/discitis requiring L1 microdiscectomy (3/23/2021), and T12 kyphoplasty (10/26/2021), rheumatoid arthritis on disease modifying agents, osteoporosis, Sjogren's disease, MI, CAD with stenting, DVT right lower extremity currently on Xarelto and ASA (as of 11/4/2021), hypertension, COPD, hyperlipidemia, hypothyroidism, and she is overweight.      10/26/2021 she underwent an uneventful kyphoplasty.  She did well after the surgery with improved back pain.  She worked with physical therapy and was cleared for discharge.    10/30/2021 the patient was at [facility] rehab when she began to have lower extremity weakness and worsening anterior thigh pain and numbness.  Is unclear when this began and if it was progressive or an acute onset.  Patient was still able to rise with assistive devices.  She is unclear as to the last and that she stood independently but she believes that this was prior to discharge.  She denies any bowel or bladder incontinence.  Currently she complains of low back pain.  Nonmechanical nature.  Bilateral anterior thigh pain.  She still able to wiggle her ankles and toes but has significantly decreased proximal thighs.    Review of Systems   HENT: Negative.    Eyes: Negative.    Respiratory: Negative.    Cardiovascular: Negative.    Gastrointestinal: Negative.    Endocrine: Negative.    Genitourinary: Negative.    Musculoskeletal: Positive for back pain and gait problem.   Skin: Negative.    Allergic/Immunologic: Negative.    Neurological: Positive for weakness and numbness.   Hematological: Negative.    Psychiatric/Behavioral: Negative.         Past Medical History:  has a past medical history of Age-related osteoporosis with current pathological fracture (5/27/2020), Arthritis, Asthma, Bilateral  bunions (12/23/2020), Cancer ([facility]), Cardiac pacemaker syndrome (12/23/2020), Charcot's joint of foot, left (12/23/2020), Chronic deep vein thrombosis (DVT) of right lower extremity ([facility]) (6/23/2021), Chronic pain syndrome (6/22/2021), Chronic sinusitis, COPD (chronic obstructive pulmonary disease) ([facility]), Coronary artery disease, 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), Elevated cholesterol, Eustachian tube dysfunction, Heart disease, Herpes simplex, History of transfusion, Hyperlipidemia, Hypertension, Hypothyroidism (12/23/2020), Intrinsic asthma (12/23/2020), Knee dislocation, Labral tear of right hip joint, Laryngitis sicca, Laryngitis, chronic, Left carotid bruit (3/9/2016), MI (myocardial infarction) ([facility]), Myalgia due to statin (6/25/2019), Open wound of right hip (9/14/2021), Osteomyelitis of right femur ([facility]) (7/6/2021), Otorrhea, Pacemaker (11/17/2016), Primary osteoarthritis of left knee (12/23/2020), Psoriasis vulgaris (12/23/2020), S/P coronary artery stent placement (3/9/2016), Sensorineural hearing loss, Seropositive rheumatoid arthritis of multiple sites ([facility]) (12/27/2019), Sick sinus syndrome ([facility]) (12/27/2019), Sjogren's disease ([facility]), Spondylolisthesis of lumbar region (1/17/2018), and Syncope, recurrent (2/8/2021).    Past Surgical History:  has a past surgical history that includes Myringotomy w/ tubes (09/04/2014); Atrial cardiac pacemaker insertion; A-V cardiac pacemaker insertion (2016); lumbar laminectomy with fusion (Left, 1/17/2018); Lumbar fusion (N/A, 1/19/2018); Replacement total knee (Right); Other surgical history; Cardiac catheterization; Coronary angioplasty with stent; Colonoscopy (11/08/2011); Colonoscopy (11/12/2004); Esophagogastroduodenoscopy (07/10/2014); Cataract extraction; Joint replacement; hip abduction tenotomy bilateral (Right, 1/14/2021); Incision and drainage hip (Right, 2/9/2021); Cervical Curettage (N/A, 3/3/2021); Lumbar discectomy (Right, 3/23/2021);  Incision and drainage of wound (Right, 7/8/2021); Leg Debridement (Right, 9/14/2021); Flap Leg (Right, 9/14/2021); incision and drainage leg (Right, 10/24/2021); and kyphoplasty with biopsy (Bilateral, 10/26/2021)..      Family History: family history includes Cancer in her mother; Heart disease in her father.     Social History:  reports that she has never smoked. She has never used smokeless tobacco. She reports that she does not drink alcohol and does not use drugs.    Allergies: Atorvastatin, Amoxicillin, Escitalopram, Nabumetone, Niacin er, Penicillins, and Simvastatin    Home Medications:   Current Facility-Administered Medications:   •  HYDROcodone-acetaminophen (NORCO) 7.5-325 MG per tablet 1 tablet, 1 tablet, Oral, Q4H PRN, Bandar Shea MD, 1 tablet at 11/04/21 0156    Inpatient Medications: Scheduled Meds:   Continuous Infusions:   PRN Meds:.•  HYDROcodone-acetaminophen  Home Medications:   Prior to Admission medications    Medication Sig Start Date End Date Taking? Authorizing Provider   acetaminophen (TYLENOL) 325 MG tablet Take 2 tablets by mouth Every 4 (Four) Hours As Needed for Mild Pain . 7/13/21   Christos Min, DO   aspirin 81 MG EC tablet Take 1 tablet by mouth Daily. 9/16/21   Nayan Hale, DO   carvedilol (COREG) 25 MG tablet Take 1 tablet by mouth 2 (Two) Times a Day With Meals. 7/1/21   Davon Urrutia MD   ceFAZolin in 0.9% normal saline (ANCEF) 2 GM/ 100 mL solution IVPB Infuse 100 mL into a venous catheter Every 8 (Eight) Hours for 19 days. Indications: Bacteria in the Blood 10/23/21 11/11/21  Nayan Hale DO   docusate sodium 100 MG capsule Take 1 capsule by mouth 2 (Two) Times a Day. 7/13/21   Christos Min, DO   ferrous sulfate 325 (65 Fe) MG tablet Take 325 mg by mouth Daily With Breakfast.    Provider, MD Leila   fluticasone (FLONASE) 50 MCG/ACT nasal spray 1 spray into the nostril(s) as directed by provider Daily As Needed. 5/1/21   Provider,  MD Leila   folic acid (FOLVITE) 1 MG tablet Take 1 mg by mouth Daily.    Leila Daly MD   furosemide (LASIX) 40 MG tablet Take 40 mg by mouth Daily.    Leila Daly MD   HYDROcodone-acetaminophen (NORCO) 7.5-325 MG per tablet Take 1 tablet by mouth Every 4 (Four) Hours As Needed for Moderate Pain . 10/28/21   Nayan Hale DO   isosorbide mononitrate (IMDUR) 60 MG 24 hr tablet Take 1 tablet by mouth 2 (Two) Times a Day. 3/26/21   Taiwo Prado APRN   levothyroxine (SYNTHROID, LEVOTHROID) 75 MCG tablet Take 1 tablet by mouth Daily. 6/22/21   Davon Urrutia MD   lidocaine (LIDODERM) 5 % Place 1 patch on the skin as directed by provider Daily As Needed. Remove & Discard patch within 12 hours or as directed by MD     Leila Daly MD   magnesium oxide (MAG-OX) 400 MG tablet Take 400 mg by mouth Daily. 4/12/21   Leila Daly MD   multivitamin with minerals tablet tablet Take 1 tablet by mouth Daily. 3/6/21   Taiwo Prado APRN   Naloxegol Oxalate (Movantik) 25 MG tablet Take 25 mg by mouth Every Morning.    Leila Daly MD   nitroglycerin (Nitrostat) 0.4 MG SL tablet Place 1 tablet under the tongue Every 5 (Five) Minutes As Needed for Chest Pain. Take no more than 3 doses in 15 minutes. 3/26/21   Taiwo Prado APRN   pantoprazole (PROTONIX) 40 MG EC tablet Take 1 tablet by mouth Daily. 6/22/21   Davon Urrutia MD   polyethylene glycol (polyethylene glycol) 17 g packet Take 17 g by mouth Daily. 10/29/21   Nayan Hale DO   potassium chloride (KLOR-CON) 20 MEQ packet Take 20 mEq by mouth 2 (Two) Times a Day.    Leila Daly MD   rivaroxaban (Xarelto) 20 MG tablet Take 1 tablet by mouth Daily. 10/12/21   Davon Urrutia MD   saccharomyces boulardii (Florastor) 250 MG capsule Take 1 capsule by mouth Daily. 7/1/21   Davon Urrutia MD   salsalate (DISALCID) 500 MG tablet Take 500 mg by mouth Daily. Take 5 tablets by mouth Monday,  Wednesdays and Fridays, and 4 tablets on Tuesdays, Thursday and Sundays    Leila Daly MD   sucralfate (CARAFATE) 1 g tablet Take 1 g by mouth 4 (Four) Times a Day Before Meals & at Bedtime.    Leila Daly MD   thiamine1 (VITAMIN B1) 250 MG tablet Take 250 mg by mouth Daily.    Leila Daly MD   traMADol (ULTRAM) 50 MG tablet Take 50 mg by mouth 3 (Three) Times a Day.    Leila Daly MD   valACYclovir (VALTREX) 500 MG tablet Take 1 tablet by mouth Daily. Indications: chronic suppressive therapy 6/22/21   Davon Urrutia MD        Vital Signs  Temp:  [97.3 °F (36.3 °C)-97.6 °F (36.4 °C)] 97.4 °F (36.3 °C)  Heart Rate:  [63-69] 69  Resp:  [16-18] 18  BP: (131-174)/(50-66) 147/59    Physical Exam  Physical Exam  Constitutional:       Appearance: She is well-developed.   HENT:      Head: Normocephalic and atraumatic.   Eyes:      Extraocular Movements: EOM normal.      Pupils: Pupils are equal, round, and reactive to light.   Pulmonary:      Effort: Pulmonary effort is normal.      Breath sounds: Normal breath sounds.   Abdominal:      Palpations: Abdomen is soft.   Musculoskeletal:         General: Normal range of motion.      Cervical back: Normal range of motion and neck supple.   Skin:     General: Skin is warm and dry.   Neurological:      Mental Status: She is oriented to person, place, and time.      Deep Tendon Reflexes:      Reflex Scores:       Tricep reflexes are 3+ on the right side and 3+ on the left side.       Bicep reflexes are 3+ on the right side and 3+ on the left side.       Brachioradialis reflexes are 3+ on the right side and 3+ on the left side.       Patellar reflexes are 3+ on the right side and 3+ on the left side.       Achilles reflexes are 3+ on the right side and 3+ on the left side.  Psychiatric:         Speech: Speech normal.         Neurologic Exam     Mental Status   Oriented to person, place, and time.   Speech: speech is normal   Level of  consciousness: alert    Cranial Nerves     CN III, IV, VI   Pupils are equal, round, and reactive to light.  Extraocular motions are normal.     CN V   Facial sensation intact.     CN VII   Facial expression full, symmetric.     Motor Exam   Right arm pronator drift: absent  Left arm pronator drift: absent    Strength   Right deltoid: 5/5  Left deltoid: 5/5  Right biceps: 5/5  Left biceps: 5/5  Right triceps: 4/5  Left triceps: 4/5  Right interossei: 4/5  Left interossei: 4/5  Right iliopsoas: 0/5  Left iliopsoas: 1/5  Right quadriceps: 1/5  Left quadriceps: 3/5  Right anterior tibial: 4/5  Left anterior tibial: 4/5  Right gastroc: 4/5  Left gastroc: 4/5    Sensory Exam   Light touch normal.   Sensory deficit distribution on right: L1  Sensory deficit distribution on left: L1    Gait, Coordination, and Reflexes     Gait  Gait: (unable to stand with a walker. )    Reflexes   Right brachioradialis: 3+  Left brachioradialis: 3+  Right biceps: 3+  Left biceps: 3+  Right triceps: 3+  Left triceps: 3+  Right patellar: 3+  Left patellar: 3+  Right achilles: 3+  Left achilles: 3+  Right plantar: upgoing  Left plantar: upgoing  Right Mims: absent  Left Mims: absent      Results Review:   Independent review and interpretation of imaging  Imaging Results (Last 24 Hours)     ** No results found for the last 24 hours. **        MRI brain:  MRI spine: Outside noncontrast MRI of the lumbar spine is reviewed.  Evidence of prior kyphoplasty at T12.  Anterior epidural hematoma from T11-L1 2 disc base.  Prior evidence of osteomyelitis at L1/2.  Prior instrumented fusion from L3-L5 with construct appears stable and no evidence of cord compression at this level.      CT Head:  CT c-spine:  CT t-spine:  CT l-spine:  X-ray:    I reviewed the patient's new clinical results.  Lab Results (last 24 hours)     ** No results found for the last 24 hours. **          Bandar Shea MD          Electronically signed by Shabbir  Bandar Vizcaino MD at 21 0700          Physician Progress Notes (last 24 hours)      Amadeo Turner MD at 21 0808     Summary:Plastic Surgery Progress Note               [facility]   Progress Note    Patient Name: Tawny Shin  : 1955  MRN: [id number]  Primary Care Physician:  Davon Urrutia MD  Date of admission: 2021    Subjective   Subjective     Chief Complaint: Lower extremity weakness    History of Present Illness  Patient Reports No right hip pain.  Coccygeal pain.  Discouragement regarding prognosis and ambulation.  Poor appetite.      Objective   Objective     Vitals:   Temp:  [97.6 °F (36.4 °C)-98.2 °F (36.8 °C)] 97.6 °F (36.4 °C)  Heart Rate:  [63-70] 67  Resp:  [16-18] 16  BP: (107-132)/(50-81) 132/56    Physical Exam:   The right hip dressing was removed.  1 inch Nu Gauze in place as ordered.  Adjacent skin is healthy appearing.  No erythema.  No induration.  Scant serous drainage on overlying ABD.  No underlying fluctuance.  No tenderness to palpation.    Result Review    Result Review:  I have personally reviewed the results from the time of this admission to 2021 08:09 CST and agree with these findings:  []  Laboratory  []  Microbiology  []  Radiology  []  EKG/Telemetry   []  Cardiology/Vascular   []  Pathology  []  Old records  []  Other:  Most notable findings include:     Assessment/Plan   Assessment / Plan     Brief Patient Summary:  Tawny Shin is a 66 y.o. female who Now hospital day 5 of her readmission after developing gradual onset bilateral lower extremity weakness.  At her previous hospitalization, I performed an I&D of a right hip seroma.  I have been examining her operative site and managing wound care.  I do not believe any additional intervention is warranted at this point beyond continued local wound care.    I had a long discussion with the patient today regarding other aspects of her care that need to be carefully monitored moving forward.   I discuss with her the high risk of developing pressure wounds in a context of minimal or no ambulation. We discussed how the best treatment for pressure wounds is prevention in the significant challenges involved with addressing these wounds once they have been developed.  I reiterated the importance of frequent position changes.    Active Hospital Problems:  Active Hospital Problems    Diagnosis    • Epidural hematoma (HCC)        DVT prophylaxis:  Medical and mechanical DVT prophylaxis orders are present.    CODE STATUS:    Level Of Support Discussed With: Patient  Code Status (Patient has no pulse and is not breathing): CPR (Attempt to Resuscitate)  Medical Interventions (Patient has pulse or is breathing): Full Support      Plan:   1.  Continue local wound care of her right hip.  No plan for additional I&D at present  2.  Continue diligent repositioning and skin barrier use.          Electronically signed by Amadeo Turner MD, 11/08/21, 8:09 AM CST.                Electronically signed by Amadeo Turner MD at 11/08/21 0814       Consult Notes (last 24 hours)  Notes from 11/07/21 1032 through 11/08/21 1032   No notes of this type exist for this encounter.            Physical Therapy Notes (last 24 hours)      Garcia Francis PTA at 11/08/21 0977  Version 1 of 1       Goal Outcome Evaluation:  Plan of Care Reviewed With: patient        Progress: improving  Outcome Summary: Pt was premedicated for tx.  Able to perform sidelying to sitting with mod assist using the bed rail and with HOB elevated.  Sitting EOB, pt was able to tolerate for 10 minutes with UE support and CGA.  While sitting EOB, pt could actively perform ankle pumps BLE, LAQ on LLE.  In fowlers performed PROM to BLE for full ROM, pt was able to assist with heel slides and hip add.  Will continue to work with pt to increase strength and sitting tolerance to progress to being able to perform sliding board t/f.    Electronically signed by  Garcia Francis L, PTA at 21 0959     Garcia Francis L, PTA at 21 0959  Version 1 of 1         Acute Care - Physical Therapy Treatment Note   [place]     Patient Name: Tawny Shin  : 1955  MRN: [id number]  Today's Date: 2021      Visit Dx:     ICD-10-CM ICD-9-CM   1. Impaired mobility  Z74.09 799.89   2. Impaired mobility and ADLs  Z74.09 V49.89    Z78.9    3. Dysphagia, unspecified type  R13.10 787.20     Patient Active Problem List   Diagnosis   • T12 compression fracture, initial encounter ([facility])   • Chronic embolism and thrombosis of unspecified deep veins of left lower extremity ([facility])   • Urinary tract infection without hematuria   • Acute bilateral thoracic back pain   • Chronic anticoagulation   • Hypokalemia   • Transaminitis   • Iron deficiency anemia   • Osteoporosis   • Bacteremia   • Moderate malnutrition (CMS/[facility])   • Epidural hematoma ([facility])     Past Medical History:   Diagnosis Date   • Age-related osteoporosis with current pathological fracture 2020   • Arthritis    • Asthma    • Bilateral bunions 2020   • Cancer ([facility])    • Cardiac pacemaker syndrome 2020    Overview:  - heart block - implanted    • Charcot's joint of foot, left 2020   • Chronic deep vein thrombosis (DVT) of right lower extremity ([facility]) 2021   • Chronic pain syndrome 2021   • Chronic sinusitis    • COPD (chronic obstructive pulmonary disease) ([facility])    • Coronary artery disease    • 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   • Elevated cholesterol    • Eustachian tube dysfunction    • Heart disease    • Herpes simplex    • History of transfusion    • Hyperlipidemia    • Hypertension    • Hypothyroidism 2020   • Intrinsic asthma 2020   • Knee dislocation    • Labral tear of right hip joint    • Laryngitis sicca    • Laryngitis, chronic    • Left carotid bruit 3/9/2016   • MI (myocardial infarction) ([facility])    • Myalgia due to statin 2019   • Open wound  "of right hip 9/14/2021   • Osteomyelitis of right femur (HCC) 7/6/2021   • Otorrhea    • Pacemaker 11/17/2016   • Primary osteoarthritis of left knee 12/23/2020   • Psoriasis vulgaris 12/23/2020   • S/P coronary artery stent placement 3/9/2016   • Sensorineural hearing loss    • Seropositive rheumatoid arthritis of multiple sites ([facility]) 12/27/2019    Overview:  -myochrysine '93-'96 -methotrexate '96--->11/98;r/s  restarted 2/99--> 8/14 (anemia) -sulfasalazine- not effective -penicillamine 6/98-->10/98; no effect -leflunomide 11/98--> - Humira '13-->didn't take - Enbrel 12/14-->3/15- no effect!   Last Assessment & Plan:  - \"aching all over\" because she had to be off her anti-rheumatic drugs for 2 weeks in preparation for her R knee surgery - he   • Sick sinus syndrome ([facility]) 12/27/2019   • Sjogren's disease ([facility])    • Spondylolisthesis of lumbar region 1/17/2018   • Syncope, recurrent 2/8/2021     Past Surgical History:   Procedure Laterality Date   • A-V CARDIAC PACEMAKER INSERTION  2016   • ATRIAL CARDIAC PACEMAKER INSERTION     • CARDIAC CATHETERIZATION     • CATARACT EXTRACTION     • CERVICAL CORPECTOMY N/A 3/3/2021    Procedure: CERVICAL 6 CORPECTOMY WITH TITANIUM CAGE WITH NEURO MONITORING;  Surgeon: Bandar Shea MD;  Location:  [facility] OR;  Service: Neurosurgery;  Laterality: N/A;   • COLONOSCOPY  11/08/2011    One fold in the ascending colon which showed ulcer otherwise normal exam   • COLONOSCOPY  11/12/2004    Normal exam repeat in five years   • CORONARY ANGIOPLASTY WITH STENT PLACEMENT      X 2; 2013 & 2014   • ENDOSCOPY  07/10/2014    Normal exam   • FLAP LEG Right 9/14/2021    Procedure: RIGHT GLUTEAL FASCIOCUTANEOUS ADVANCEMENT FLAP AND RIGHT TENSOR FASCIAL JESSICA FLAP;  Surgeon: Amadeo Turner MD;  Location:  [facility] OR;  Service: Plastics;  Laterality: Right;   • HIP ABDUCTION TENOTOMY BILATERAL Right 1/14/2021    Procedure: RIGHT HIP GLUTEUS MEDLUS / MINIMUS REPAIR, POSSIBLE ACHILLES " ALLOGRAFT;  Surgeon: Nino Carlson MD;  Location:  [facility] OR;  Service: Orthopedics;  Laterality: Right;   • INCISION AND DRAINAGE HIP Right 2/9/2021    Procedure: HIP INCISION AND DRAINAGE;  Surgeon: Nino Carlson MD;  Location:  [facility] OR;  Service: Orthopedics;  Laterality: Right;   • INCISION AND DRAINAGE LEG Right 10/24/2021    Procedure: INCISION AND DRAINAGE LOWER EXTREMITY;  Surgeon: Amadeo Turner MD;  Location:  [facility] OR;  Service: Plastics;  Laterality: Right;   • INCISION AND DRAINAGE OF WOUND Right 7/8/2021    Procedure: INCISION AND DRAINAGE WOUND RIGHT HIP;  Surgeon: James Huntley MD;  Location:  [facility] OR;  Service: Orthopedics;  Laterality: Right;   • JOINT REPLACEMENT     • KYPHOPLASTY WITH BIOPSY Bilateral 10/26/2021    Procedure: THOARCIC 12 KYPHOPLASTY WITH BIOPSY;  Surgeon: Bandar Shea MD;  Location:  [facility] OR;  Service: Neurosurgery;  Laterality: Bilateral;   • LEG DEBRIDEMENT Right 9/14/2021    Procedure: DEBRIDEMENT OF RIGHT HIP WOUND, RIGHT GLUTEAL FASCIOCUTANEOUS ADVANCEMENT FLAP AND RIGHT TENSOR FASCIAL JESSICA FLAP;  Surgeon: Amadeo Turner MD;  Location: [facility] OR;  Service: Plastics;  Laterality: Right;   • LUMBAR DISCECTOMY Right 3/23/2021    Procedure: LUMBAR DISCECTOMY MICRO, Lumbar 1/2 right;  Surgeon: Bandar Shea MD;  Location: [facility] OR;  Service: Neurosurgery;  Laterality: Right;   • LUMBAR FUSION N/A 1/19/2018    Procedure: L3-4,L4-5 DECOMPRESSION, POSTERIOR SPINAL FUSION WITH INSTRUMENTATION;  Surgeon: Fortino Oropeza MD;  Location: [facility] OR;  Service:    • LUMBAR LAMINECTOMY WITH FUSION Left 1/17/2018    Procedure: LEFT L3-4 L4-5 LATERAL LUMBAR INTERBODY FUSION;  Surgeon: Fortino Oropeza MD;  Location: [facility] OR;  Service:    • MYRINGOTOMY W/ TUBES  09/04/2014    TUBES NO LONGER IN PLACE   • OTHER SURGICAL HISTORY      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 so hardware was removed and spacers were placed   • REPLACEMENT TOTAL KNEE Right       PT Assessment (last 12 hours)     PT Evaluation and Treatment     Row Name 11/08/21 0925          Physical Therapy Time and Intention    Subjective Information complains of; weakness; fatigue; pain  -     Document Type therapy note (daily note)  -KINGS     Mode of Treatment physical therapy  -KINGS     Comment TLSO  -KINGS     Row Name 11/08/21 0925          General Information    Existing Precautions/Restrictions fall; spinal; brace worn when out of bed  TLSO  -KINGS     Row Name 11/08/21 0925          Pain Scale: Numbers Pre/Post-Treatment    Pretreatment Pain Rating 5/10  -KINGS     Posttreatment Pain Rating 7/10  -KINGS     Pain Location back  -KINGS     Pre/Posttreatment Pain Comment pt stated pain was more centralized to her back today, not in LE's  -     Row Name 11/08/21 0925          Bed Mobility    Sidelying-Sit [place] (Bed Mobility) verbal cues; moderate assist (50% patient effort)  -     Sit-Sidelying [place] (Bed Mobility) verbal cues; moderate assist (50% patient effort); 2 person assist  -     Row Name 11/08/21 0925          Transfers    Comment (Transfers) not attempted due to weakness,  will progress with sliding board t/f  -     Row Name 11/08/21 0925          Balance    Comment, Balance sitting EOB, pt was able to tolerate for 10 minutes with UE support and CGA  -     Row Name 11/08/21 0925          Aerobic Exercise    Comment, Aerobic Exercise (Therapeutic Exercise) PROM/AAROM BLE in fowlers.  Pt is able to actively perform ankle pumps BLE, LAQ on LLE, and AAROM for heel slide on BLE  -KINGS     Row Name             Wound 10/24/21 0819 Right hip Incision    Wound - Properties Group Placement Date: 10/24/21  -DT Placement Time: 0819 -DT Present on Hospital Admission: N  -DT Side: Right  -DT Location: hip  -DT Primary Wound Type: Incision  -DT     Retired Wound - Properties Group Date first assessed: 10/24/21  -DT Time first assessed: 0819  -DT Present on Hospital Admission: N  -DT Side: Right   -DT Location: hip  -DT Primary Wound Type: Incision  -DT     Row Name             Wound 11/07/21 0416 Bilateral coccyx    Wound - Properties Group Placement Date: 11/07/21  -LF Placement Time: 0416  -LF Present on Hospital Admission: N  -LF Side: Bilateral  -LF Location: coccyx  -LF     Retired Wound - Properties Group Date first assessed: 11/07/21  -LF Time first assessed: 0416  -LF Present on Hospital Admission: N  -LF Side: Bilateral  -LF Location: coccyx  -LF     Row Name 11/08/21 0925          Positioning and Restraints    Pre-Treatment Position in bed  -KINGS     Post Treatment Position bed  -KINGS     In Bed side lying left; call light within reach; encouraged to call for assist; with family/caregiver; side rails up x2  -KINGS           User Key  (r) = Recorded By, (t) = Taken By, (c) = Cosigned By    Initials Name Provider Type    Garcia Jaramillo, PTA Physical Therapy Assistant    Jean Carlos Aburto RN Registered Nurse    So Robison RN Registered Nurse              Physical Therapy Education                 Title: PT OT SLP Therapies (In Progress)     Topic: Physical Therapy (In Progress)     Point: Mobility training (Done)     Learning Progress Summary           Patient Acceptance, E, VU,NR by  at 11/4/2021 1509    Comment: Educated pt on spinal precautions, proper body mechanics during bed mobility transfers, POC, and d/c.                   Point: Home exercise program (Not Started)     Learner Progress:  Not documented in this visit.          Point: Body mechanics (Done)     Learning Progress Summary           Patient Acceptance, E, VU,NR by  at 11/4/2021 1509    Comment: Educated pt on spinal precautions, proper body mechanics during bed mobility transfers, POC, and d/c.                   Point: Precautions (Done)     Learning Progress Summary           Patient Acceptance, E,TB, VU by  at 11/7/2021 1027    Comment: positioning    Acceptance, E, VU,NR by  at 11/4/2021 1509    Comment:  Educated pt on spinal precautions, proper body mechanics during bed mobility transfers, POC, and d/c.                               User Key     Initials Effective Dates Name Provider Type Discipline     06/16/21 -  Olga Chambers, SERENA Physical Therapy Assistant PT     09/07/21 -  Juliana Angela, PT Student PT Student PT              PT Recommendation and Plan     Plan of Care Reviewed With: patient  Progress: improving  Outcome Summary: Pt was premedicated for tx.  Able to perform sidelying to sitting with mod assist using the bed rail and with HOB elevated.  Sitting EOB, pt was able to tolerate for 10 minutes with UE support and CGA.  While sitting EOB, pt could actively perform ankle pumps BLE, LAQ on LLE.  In fowlers performed PROM to BLE for full ROM, pt was able to assist with heel slides and hip add.  Will continue to work with pt to increase strength and sitting tolerance to progress to being able to perform sliding board t/f.   Outcome Measures     Row Name 11/06/21 1300             How much help from another is currently needed...    Putting on and taking off regular lower body clothing? 1  -LS      Bathing (including washing, rinsing, and drying) 2  -LS      Toileting (which includes using toilet bed pan or urinal) 1  -LS      Putting on and taking off regular upper body clothing 2  -LS      Taking care of personal grooming (such as brushing teeth) 3  -LS      Eating meals 3  -LS      AM-PAC 6 Clicks Score (OT) 12  -LS              Functional Assessment    Outcome Measure Options AM-PAC 6 Clicks Daily Activity (OT)  -LS            User Key  (r) = Recorded By, (t) = Taken By, (c) = Cosigned By    Initials Name Provider Type    Tara Rodriguez COTA Occupational Therapy Assistant                 Time Calculation:    PT Charges     Row Name 11/08/21 0925             Time Calculation    Start Time 0925  -KINGS      Stop Time 0957  -KINGS      Time Calculation (min) 32 min  -KINGS      PT Received On 11/08/21   -KINGS              Time Calculation- PT    Total Timed Code Minutes- PT 32 minute(s)  -KINGS              Timed Charges    52433 - PT Therapeutic Exercise Minutes 16  -KINGS      06292 - PT Therapeutic Activity Minutes 16  -KINGS              Total Minutes    Timed Charges Total Minutes 32  -KINGS       Total Minutes 32  -KINGS            User Key  (r) = Recorded By, (t) = Taken By, (c) = Cosigned By    Initials Name Provider Type    Garcia Jaramillo PTA Physical Therapy Assistant              Therapy Charges for Today     Code Description Service Date Service Provider Modifiers Qty    99866501921 HC PT THERAPEUTIC ACT EA 15 MIN 11/8/2021 Garcia Francis PTA GP 1    25865998494 HC PT THER PROC EA 15 MIN 11/8/2021 Garcia Francis, SERENA GP 1          PT G-Codes  Outcome Measure Options: AM-PAC 6 Clicks Daily Activity (OT)  AM-PAC 6 Clicks Score (PT): 10  AM-PAC 6 Clicks Score (OT): 12    Garcia Francis PTA  11/8/2021      Electronically signed by Garcia Francis PTA at 11/08/21 0959       Occupational Therapy Notes (last 24 hours)  Notes from 11/07/21 1033 through 11/08/21 1033   No notes exist for this encounter.

## 2021-11-08 NOTE — PLAN OF CARE
Goal Outcome Evaluation:  Plan of Care Reviewed With: (P) patient        Progress: (P) no change  Outcome Summary: (P) OT tx completed today. A&Ox4. Pt c/o 8/10 pain in her R hip. Pt completed rolling R and L with min A and verbal cues due to pain. pt refused to sit EOB due to R hip pain. pt agreeable to oral care and exercises. Pt completed grooming skills with set up. Pt completed B UE AROM in all planes and at all joints 10 reps x3. pt would benefit from skilled OT services to address strength, balance, and activity tolerance while performing ADLs. OT recommends SNF upon d/c.

## 2021-11-09 PROBLEM — E43 SEVERE MALNUTRITION (HCC): Status: ACTIVE | Noted: 2021-11-09

## 2021-11-09 PROCEDURE — 99231 SBSQ HOSP IP/OBS SF/LOW 25: CPT | Performed by: NEUROLOGICAL SURGERY

## 2021-11-09 PROCEDURE — 25010000002 HEPARIN (PORCINE) PER 1000 UNITS: Performed by: NEUROLOGICAL SURGERY

## 2021-11-09 PROCEDURE — 97530 THERAPEUTIC ACTIVITIES: CPT | Performed by: PHYSICAL THERAPIST

## 2021-11-09 PROCEDURE — 25010000002 CEFAZOLIN PER 500 MG: Performed by: NEUROLOGICAL SURGERY

## 2021-11-09 PROCEDURE — 99221 1ST HOSP IP/OBS SF/LOW 40: CPT | Performed by: NURSE PRACTITIONER

## 2021-11-09 PROCEDURE — 97110 THERAPEUTIC EXERCISES: CPT | Performed by: PHYSICAL THERAPIST

## 2021-11-09 RX ORDER — HEPARIN SODIUM 5000 [USP'U]/ML
5000 INJECTION, SOLUTION INTRAVENOUS; SUBCUTANEOUS EVERY 12 HOURS SCHEDULED
Status: DISCONTINUED | OUTPATIENT
Start: 2021-11-09 | End: 2021-11-12 | Stop reason: HOSPADM

## 2021-11-09 RX ADMIN — ISOSORBIDE MONONITRATE 60 MG: 60 TABLET, EXTENDED RELEASE ORAL at 08:27

## 2021-11-09 RX ADMIN — HEPARIN SODIUM 5000 UNITS: 5000 INJECTION INTRAVENOUS; SUBCUTANEOUS at 20:37

## 2021-11-09 RX ADMIN — PANTOPRAZOLE SODIUM 40 MG: 40 TABLET, DELAYED RELEASE ORAL at 08:26

## 2021-11-09 RX ADMIN — FLUTICASONE PROPIONATE 1 SPRAY: 50 SPRAY, METERED NASAL at 20:38

## 2021-11-09 RX ADMIN — HYDROCODONE BITARTRATE AND ACETAMINOPHEN 1 TABLET: 7.5; 325 TABLET ORAL at 08:49

## 2021-11-09 RX ADMIN — Medication 250 MG: at 08:27

## 2021-11-09 RX ADMIN — FOLIC ACID 1 MG: 1 TABLET ORAL at 08:27

## 2021-11-09 RX ADMIN — FUROSEMIDE 40 MG: 40 TABLET ORAL at 08:27

## 2021-11-09 RX ADMIN — DOCUSATE SODIUM 50 MG AND SENNOSIDES 8.6 MG 2 TABLET: 8.6; 5 TABLET, FILM COATED ORAL at 20:37

## 2021-11-09 RX ADMIN — ISOSORBIDE MONONITRATE 60 MG: 60 TABLET, EXTENDED RELEASE ORAL at 20:37

## 2021-11-09 RX ADMIN — DOCUSATE SODIUM 100 MG: 100 CAPSULE, LIQUID FILLED ORAL at 20:37

## 2021-11-09 RX ADMIN — TRAMADOL HYDROCHLORIDE 100 MG: 50 TABLET ORAL at 08:27

## 2021-11-09 RX ADMIN — HYDROCODONE BITARTRATE AND ACETAMINOPHEN 1 TABLET: 7.5; 325 TABLET ORAL at 03:22

## 2021-11-09 RX ADMIN — DOCUSATE SODIUM 100 MG: 100 CAPSULE, LIQUID FILLED ORAL at 08:27

## 2021-11-09 RX ADMIN — SUCRALFATE 1 G: 1 TABLET ORAL at 20:37

## 2021-11-09 RX ADMIN — SUCRALFATE 1 G: 1 TABLET ORAL at 16:13

## 2021-11-09 RX ADMIN — FLUTICASONE PROPIONATE 1 SPRAY: 50 SPRAY, METERED NASAL at 08:29

## 2021-11-09 RX ADMIN — CEFAZOLIN 2 G: 10 INJECTION, POWDER, FOR SOLUTION INTRAVENOUS at 08:26

## 2021-11-09 RX ADMIN — POTASSIUM CHLORIDE 20 MEQ: 10 CAPSULE, COATED, EXTENDED RELEASE ORAL at 20:37

## 2021-11-09 RX ADMIN — CARVEDILOL 25 MG: 25 TABLET, FILM COATED ORAL at 08:27

## 2021-11-09 RX ADMIN — POTASSIUM CHLORIDE 20 MEQ: 10 CAPSULE, COATED, EXTENDED RELEASE ORAL at 08:26

## 2021-11-09 RX ADMIN — SUCRALFATE 1 G: 1 TABLET ORAL at 12:07

## 2021-11-09 RX ADMIN — HYDROCODONE BITARTRATE AND ACETAMINOPHEN 1 TABLET: 7.5; 325 TABLET ORAL at 20:36

## 2021-11-09 RX ADMIN — SUCRALFATE 1 G: 1 TABLET ORAL at 16:33

## 2021-11-09 RX ADMIN — FERROUS SULFATE TAB 325 MG (65 MG ELEMENTAL FE) 325 MG: 325 (65 FE) TAB at 08:27

## 2021-11-09 RX ADMIN — MAGNESIUM GLUCONATE 500 MG ORAL TABLET 400 MG: 500 TABLET ORAL at 08:27

## 2021-11-09 RX ADMIN — POLYETHYLENE GLYCOL 3350 17 G: 17 POWDER, FOR SOLUTION ORAL at 08:26

## 2021-11-09 RX ADMIN — TRAMADOL HYDROCHLORIDE 100 MG: 50 TABLET ORAL at 16:13

## 2021-11-09 RX ADMIN — DOCUSATE SODIUM 50 MG AND SENNOSIDES 8.6 MG 2 TABLET: 8.6; 5 TABLET, FILM COATED ORAL at 08:27

## 2021-11-09 RX ADMIN — VALACYCLOVIR HYDROCHLORIDE 500 MG: 500 TABLET, FILM COATED ORAL at 08:27

## 2021-11-09 RX ADMIN — THIAMINE HCL TAB 100 MG 250 MG: 100 TAB at 08:26

## 2021-11-09 RX ADMIN — LEVOTHYROXINE SODIUM 75 MCG: 75 TABLET ORAL at 06:26

## 2021-11-09 RX ADMIN — CARVEDILOL 25 MG: 25 TABLET, FILM COATED ORAL at 18:02

## 2021-11-09 RX ADMIN — CEFAZOLIN 2 G: 10 INJECTION, POWDER, FOR SOLUTION INTRAVENOUS at 16:13

## 2021-11-09 RX ADMIN — HYDROCODONE BITARTRATE AND ACETAMINOPHEN 1 TABLET: 7.5; 325 TABLET ORAL at 16:13

## 2021-11-09 RX ADMIN — ACETAMINOPHEN 650 MG: 325 TABLET, FILM COATED ORAL at 12:07

## 2021-11-09 RX ADMIN — SODIUM CHLORIDE, PRESERVATIVE FREE 10 ML: 5 INJECTION INTRAVENOUS at 20:40

## 2021-11-09 RX ADMIN — SUCRALFATE 1 G: 1 TABLET ORAL at 08:27

## 2021-11-09 RX ADMIN — TRAMADOL HYDROCHLORIDE 100 MG: 50 TABLET ORAL at 20:36

## 2021-11-09 NOTE — CONSULTS
[facility]  INPATIENT WOUND CONSULTATION    Today's Date: 11/09/21    Patient Name: Tawny Shin  MRN: [id number]  [facility]: [id number]  PCP: Davon Urrutia MD  Referring Provider: Bandar Shea MD  Attending Provider: Bandar Shea, *  Length of Stay: 5    SUBJECTIVE   Chief Complaint: Buttocks wound    HPI: Tawny Shin, a 66 y.o.female, presents with a past medical history of myocardial infarction, coronary artery disease, and Sjogren's disease.  Patient has a recent history of back surgery complicated with osteomyelitis of right femur and lumbar spine.  A full past medical history as listed below.  Dr. Shea admitted patient on 11/4 due to numbness and pain of L1-L3 and worsening proximal leg weakness over the last 4 to 5 days.  Inpatient wound care is consulted due to wound of the buttocks area.  Patient is found to have intergluteal candidal intertrigo.  She also has stage 1 pressure injuries on the right and left upper buttocks.  The pressure injuries were first assessed on 11/7.  Patient states she has laid on her back for an extended period of time recently so she is not surprised that she has these injuries.  She understands what pressure injuries are and the importance turning and repositioning.      Visit Dx:    ICD-10-CM ICD-9-CM   1. Impaired mobility  Z74.09 799.89   2. Impaired mobility and ADLs  Z74.09 V49.89    Z78.9    3. Dysphagia, unspecified type  R13.10 787.20     Patient Active Problem List   Diagnosis   • T12 compression fracture, initial encounter ([facility])   • Chronic embolism and thrombosis of unspecified deep veins of left lower extremity (HCC)   • Urinary tract infection without hematuria   • Acute bilateral thoracic back pain   • Chronic anticoagulation   • Hypokalemia   • Transaminitis   • Iron deficiency anemia   • Osteoporosis   • Bacteremia   • Moderate malnutrition (CMS/HCC)   • Epidural hematoma (HCC)       History:   Past Medical History:  "  Diagnosis Date   • Age-related osteoporosis with current pathological fracture 5/27/2020   • Arthritis    • Asthma    • Bilateral bunions 12/23/2020   • Cancer ([facility])    • Cardiac pacemaker syndrome 12/23/2020    Overview:  - heart block - implanted 11/16   • Charcot's joint of foot, left 12/23/2020   • Chronic deep vein thrombosis (DVT) of right lower extremity ([facility]) 6/23/2021   • Chronic pain syndrome 6/22/2021   • Chronic sinusitis    • COPD (chronic obstructive pulmonary disease) ([facility])    • Coronary artery disease    • 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   • Elevated cholesterol    • Eustachian tube dysfunction    • Heart disease    • Herpes simplex    • History of transfusion    • Hyperlipidemia    • Hypertension    • Hypothyroidism 12/23/2020   • Intrinsic asthma 12/23/2020   • Knee dislocation    • Labral tear of right hip joint    • Laryngitis sicca    • Laryngitis, chronic    • Left carotid bruit 3/9/2016   • MI (myocardial infarction) ([facility])    • Myalgia due to statin 6/25/2019   • Open wound of right hip 9/14/2021   • Osteomyelitis of right femur ([facility]) 7/6/2021   • Otorrhea    • Pacemaker 11/17/2016   • Primary osteoarthritis of left knee 12/23/2020   • Psoriasis vulgaris 12/23/2020   • S/P coronary artery stent placement 3/9/2016   • Sensorineural hearing loss    • Seropositive rheumatoid arthritis of multiple sites ([facility]) 12/27/2019    Overview:  -myochrysine '93-'96 -methotrexate '96--->11/98;r/s  restarted 2/99--> 8/14 (anemia) -sulfasalazine- not effective -penicillamine 6/98-->10/98; no effect -leflunomide 11/98--> - Humira '13-->didn't take - Enbrel 12/14-->3/15- no effect!   Last Assessment & Plan:  - \"aching all over\" because she had to be off her anti-rheumatic drugs for 2 weeks in preparation for her R knee surgery - he   • Sick sinus syndrome ([facility]) 12/27/2019   • Sjogren's disease ([facility])    • Spondylolisthesis of lumbar region 1/17/2018   • Syncope, recurrent 2/8/2021     Past Surgical " History:   Procedure Laterality Date   • A-V CARDIAC PACEMAKER INSERTION  2016   • ATRIAL CARDIAC PACEMAKER INSERTION     • CARDIAC CATHETERIZATION     • CATARACT EXTRACTION     • CERVICAL CORPECTOMY N/A 3/3/2021    Procedure: CERVICAL 6 CORPECTOMY WITH TITANIUM CAGE WITH NEURO MONITORING;  Surgeon: Bandar Shea MD;  Location:  [facility] OR;  Service: Neurosurgery;  Laterality: N/A;   • COLONOSCOPY  11/08/2011    One fold in the ascending colon which showed ulcer otherwise normal exam   • COLONOSCOPY  11/12/2004    Normal exam repeat in five years   • CORONARY ANGIOPLASTY WITH STENT PLACEMENT      X 2; 2013 & 2014   • ENDOSCOPY  07/10/2014    Normal exam   • FLAP LEG Right 9/14/2021    Procedure: RIGHT GLUTEAL FASCIOCUTANEOUS ADVANCEMENT FLAP AND RIGHT TENSOR FASCIAL JESSICA FLAP;  Surgeon: Amadeo Turner MD;  Location:  [facility] OR;  Service: Plastics;  Laterality: Right;   • HIP ABDUCTION TENOTOMY BILATERAL Right 1/14/2021    Procedure: RIGHT HIP GLUTEUS MEDLUS / MINIMUS REPAIR, POSSIBLE ACHILLES ALLOGRAFT;  Surgeon: Nino Carlson MD;  Location:  [facility];  Service: Orthopedics;  Laterality: Right;   • INCISION AND DRAINAGE HIP Right 2/9/2021    Procedure: HIP INCISION AND DRAINAGE;  Surgeon: Nino Carlson MD;  Location:  [facility];  Service: Orthopedics;  Laterality: Right;   • INCISION AND DRAINAGE LEG Right 10/24/2021    Procedure: INCISION AND DRAINAGE LOWER EXTREMITY;  Surgeon: Amadeo Turner MD;  Location:  [facility];  Service: Plastics;  Laterality: Right;   • INCISION AND DRAINAGE OF WOUND Right 7/8/2021    Procedure: INCISION AND DRAINAGE WOUND RIGHT HIP;  Surgeon: James Huntley MD;  Location:  [facility];  Service: Orthopedics;  Laterality: Right;   • JOINT REPLACEMENT     • KYPHOPLASTY WITH BIOPSY Bilateral 10/26/2021    Procedure: THOARCIC 12 KYPHOPLASTY WITH BIOPSY;  Surgeon: Bandar Shea MD;  Location:  [facility];  Service: Neurosurgery;  Laterality: Bilateral;   • LEG  DEBRIDEMENT Right 9/14/2021    Procedure: DEBRIDEMENT OF RIGHT HIP WOUND, RIGHT GLUTEAL FASCIOCUTANEOUS ADVANCEMENT FLAP AND RIGHT TENSOR FASCIAL JESSICA FLAP;  Surgeon: Amadeo Turner MD;  Location:  [facility];  Service: Plastics;  Laterality: Right;   • LUMBAR DISCECTOMY Right 3/23/2021    Procedure: LUMBAR DISCECTOMY MICRO, Lumbar 1/2 right;  Surgeon: Bandar Shea MD;  Location:  [facility];  Service: Neurosurgery;  Laterality: Right;   • LUMBAR FUSION N/A 1/19/2018    Procedure: L3-4,L4-5 DECOMPRESSION, POSTERIOR SPINAL FUSION WITH INSTRUMENTATION;  Surgeon: Fortino Oropeza MD;  Location:  [facility];  Service:    • LUMBAR LAMINECTOMY WITH FUSION Left 1/17/2018    Procedure: LEFT L3-4 L4-5 LATERAL LUMBAR INTERBODY FUSION;  Surgeon: Fortino Oropeza MD;  Location:  [facility];  Service:    • MYRINGOTOMY W/ TUBES  09/04/2014    TUBES NO LONGER IN PLACE   • OTHER SURGICAL HISTORY      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 so hardware was removed and spacers were placed   • REPLACEMENT TOTAL KNEE Right      Social History     Socioeconomic History   • Marital status:    Tobacco Use   • Smoking status: Never Smoker   • Smokeless tobacco: Never Used   Substance and Sexual Activity   • Alcohol use: No   • Drug use: Never   • Sexual activity: Defer     Family History   Problem Relation Age of Onset   • Cancer Mother    • Heart disease Father        Allergies:  Allergies   Allergen Reactions   • Atorvastatin Other (See Comments)     LEG CRAMPS     • Amoxicillin Rash   • Escitalopram Rash   • Nabumetone Rash   • Niacin Er Rash   • Penicillins Rash   • Simvastatin Rash       Medications:    Current Facility-Administered Medications:   •  acetaminophen (TYLENOL) tablet 650 mg, 650 mg, Oral, Q4H PRN, 650 mg at 11/08/21 1701 **OR** acetaminophen (TYLENOL) 160 MG/5ML solution 650 mg, 650 mg, Oral, Q4H PRN **OR** acetaminophen (TYLENOL) suppository 650 mg, 650 mg, Rectal, Q4H PRN, Bandar Shea MD  •   sennosides-docusate (PERICOLACE) 8.6-50 MG per tablet 2 tablet, 2 tablet, Oral, BID, 2 tablet at 11/09/21 0827 **AND** polyethylene glycol (MIRALAX) packet 17 g, 17 g, Oral, Daily PRN **AND** bisacodyl (DULCOLAX) EC tablet 5 mg, 5 mg, Oral, Daily PRN **AND** bisacodyl (DULCOLAX) suppository 10 mg, 10 mg, Rectal, Daily PRN, Bandar Shea MD  •  carvedilol (COREG) tablet 25 mg, 25 mg, Oral, BID With Meals, Bandar Shea MD, 25 mg at 11/09/21 0827  •  ceFAZolin in 0.9% normal saline (ANCEF) IVPB solution 2 g, 2 g, Intravenous, Q8H, Bandar Shea MD, Last Rate: 200 mL/hr at 11/09/21 0826, 2 g at 11/09/21 0826  •  docusate sodium (COLACE) capsule 100 mg, 100 mg, Oral, BID, Bandar Shea MD, 100 mg at 11/09/21 0827  •  ferrous sulfate tablet 325 mg, 325 mg, Oral, Daily With Breakfast, Bandar Shea MD, 325 mg at 11/09/21 0827  •  fluticasone (FLONASE) 50 MCG/ACT nasal spray 1 spray, 1 spray, Nasal, BID, Bandar Shea MD, 1 spray at 11/09/21 0829  •  folic acid (FOLVITE) tablet 1 mg, 1 mg, Oral, Daily, Bandar Shea MD, 1 mg at 11/09/21 0827  •  furosemide (LASIX) tablet 40 mg, 40 mg, Oral, Daily, Bandar Shea MD, 40 mg at 11/09/21 0827  •  HYDROcodone-acetaminophen (NORCO)  MG per tablet 1 tablet, 1 tablet, Oral, Q4H PRN, Bandar Shea MD, 1 tablet at 11/07/21 1131  •  HYDROcodone-acetaminophen (NORCO) 7.5-325 MG per tablet 1 tablet, 1 tablet, Oral, Q4H PRN, Bandar Shea MD, 1 tablet at 11/09/21 0849  •  influenza vac split quad (FLUZONE,FLUARIX,AFLURIA,FLULAVAL) injection 0.5 mL, 0.5 mL, Intramuscular, During Hospitalization, Bandar Shea MD  •  isosorbide mononitrate (IMDUR) 24 hr tablet 60 mg, 60 mg, Oral, BID, Bandar Shea MD, 60 mg at 11/09/21 0827  •  levothyroxine (SYNTHROID, LEVOTHROID) tablet 75 mcg, 75 mcg, Oral, Q AM, Bandar Shea MD, 75 mcg at 11/09/21 0626  •  lidocaine  (LIDODERM) 5 % 1 patch, 1 patch, Transdermal, Daily PRN, Bandar Shea MD, 1 patch at 11/05/21 1216  •  magnesium oxide (MAG-OX) tablet 400 mg, 400 mg, Oral, Daily, Bandar Shea MD, 400 mg at 11/09/21 0827  •  nitroglycerin (NITROSTAT) SL tablet 0.4 mg, 0.4 mg, Sublingual, Q5 Min PRN, Bandar Shea MD  •  ondansetron (ZOFRAN) tablet 4 mg, 4 mg, Oral, Q6H PRN **OR** ondansetron (ZOFRAN) injection 4 mg, 4 mg, Intravenous, Q6H PRN, Bandar Shea MD  •  pantoprazole (PROTONIX) EC tablet 40 mg, 40 mg, Oral, Daily, Bandar Shea MD, 40 mg at 11/09/21 0826  •  polyethylene glycol (MIRALAX) packet 17 g, 17 g, Oral, Daily, Bandar Shea MD, 17 g at 11/09/21 0826  •  potassium chloride (MICRO-K) CR capsule 20 mEq, 20 mEq, Oral, BID, Bandar Shea MD, 20 mEq at 11/09/21 0826  •  saccharomyces boulardii (FLORASTOR) capsule 250 mg, 250 mg, Oral, Daily, Bandar Shea MD, 250 mg at 11/09/21 0827  •  sodium chloride 0.9 % flush 10 mL, 10 mL, Intravenous, Q12H, Bandar Shea MD, 10 mL at 11/08/21 2017  •  sodium chloride 0.9 % flush 10 mL, 10 mL, Intravenous, PRN, Bandar Shea MD  •  sucralfate (CARAFATE) tablet 1 g, 1 g, Oral, 4x Daily AC & at Bedtime, Bandar Shea MD, 1 g at 11/09/21 0827  •  thiamine (VITAMIN B-1) tablet 250 mg, 250 mg, Oral, Daily, Bandar Shea MD, 250 mg at 11/09/21 0826  •  traMADol (ULTRAM) tablet 100 mg, 100 mg, Oral, TID, Bandar Shea MD, 100 mg at 11/09/21 0827  •  valACYclovir (VALTREX) tablet 500 mg, 500 mg, Oral, Q24H, Bandar Shea MD, 500 mg at 11/09/21 0827    Review of Systems:  Review of Systems   Constitutional: Positive for fatigue. Negative for chills and fever.   HENT: Negative for rhinorrhea and sore throat.    Respiratory: Negative for cough and shortness of breath.    Cardiovascular: Negative for chest pain and palpitations.   Gastrointestinal:  Negative for diarrhea, nausea and vomiting.   Genitourinary: Negative for frequency and urgency.   Musculoskeletal: Positive for arthralgias, back pain, gait problem and myalgias.   Skin: Positive for color change and wound.   Neurological: Positive for weakness and numbness. Negative for dizziness.   Psychiatric/Behavioral: Negative for agitation, behavioral problems and confusion.         OBJECTIVE     Vitals:    11/09/21 0753   BP: (!) 187/74   Pulse: 72   Resp: 18   Temp: 98.4 °F (36.9 °C)   SpO2: 95%       PHYSICAL EXAM  Physical Exam  Vitals and nursing note reviewed.   Constitutional:       Appearance: She is ill-appearing.   HENT:      Head: Normocephalic and atraumatic.   Eyes:      General: Lids are normal. Gaze aligned appropriately.   Cardiovascular:      Rate and Rhythm: Normal rate and regular rhythm.   Pulmonary:      Effort: Pulmonary effort is normal. No respiratory distress.   Abdominal:      General: There is no distension.      Palpations: Abdomen is soft.   Musculoskeletal:      Cervical back: Normal range of motion and neck supple.   Skin:     General: Skin is warm and dry.      Findings: Erythema and wound present.      Comments: Intergluteal candidal intertrigo present.  Macular rash with satellite lesions of the intergluteal cleft.  Slight desquamation present.  This area is moist and painful for the patient.    Stage 1 pressure injuries on the right and left upper buttocks.  These areas are nonblanching and erythemic.  Periwound area is pink and blanchable.  There is no broken skin in this area.  Patient also has a wound of the right hip which Dr. Turner has addressed and wound care instruction has been completed.   Neurological:      Mental Status: She is alert, oriented to person, place, and time and easily aroused.   Psychiatric:         Attention and Perception: Attention normal.         Mood and Affect: Mood normal.         Speech: Speech normal.         Behavior: Behavior is  cooperative.         Picture taken by nursing staff           Results Review:  Lab Results (last 48 hours)     ** No results found for the last 48 hours. **        Imaging Results (Last 72 Hours)     ** No results found for the last 72 hours. **             ASSESSMENT/PLAN       Examination and evaluation of wound(s) was performed.    DIAGNOSIS:   Candidal intertrigo with intergluteal cleft  Pressure injury of right buttock, stage 1  Pressure injury of left buttock, stage 2  Impaired mobility  Epidural hematoma  Severe malnutrition -per registered dietitian    PLAN:     Start     Ordered    11/09/21 1003  Use Seat Cushion When Up In Chair  Continuous         11/09/21 1003    11/09/21 1002  Elevate Heels Off of Bed  Until Discontinued         11/09/21 1003    11/09/21 1002  Turn Patient  Now Then Every 2 Hours         11/09/21 1003    11/09/21 1002  Use Repositioning Wedge to Position Patient  Continuous        Comments: Use repositioning sheet and wedges to position patient    11/09/21 1003    Unscheduled  Skin Care  As Needed      Question Answer Comment   Wound Locations Buttocks and sacrococcygeal region    Wound Care Instructions Clean skin with no rinse cleansing foam.  Pat dry.  Apply thin layer of Magic barrier cream to skin.    Cleanser: Cleansing Foam        11/09/21 1003   hydrocortisone-bacitracin-zinc oxide-nystatin (MAGIC BARRIER) ointment 1 application   [[id number]]  Order Details  Ordered Dose: 1 application Route: Topical Frequency: As Needed for Skin Irritation, Buttocks and sacrococcygeal region                    Discussed findings and treatment plan including risks, benefits, and treatment options with patient in detail. Patient agreed with treatment plan.      This document has been electronically signed by DANIELA Mullins on 11/9/2021 09:43 CST

## 2021-11-09 NOTE — PROGRESS NOTES
NEUROSURGERY DAILY PROGRESS NOTE    ASSESSMENT:   Tawny Shin is a 66 y.o. with a significant medical history of prior L3-5 posterior lumbar fusion (Dr. Oropeza 2018), MSSA osteomyelitis of right femur (7/6/2021), C6 corpectomy (3/3/2021), L1-2 osteomyelitis/discitis requiring L1 microdiscectomy (3/23/2021), and T12 kyphoplasty (10/26/2021), rheumatoid arthritis on disease modifying agents, osteoporosis, Sjogren's disease, MI, CAD with stenting, DVT right lower extremity currently on Xarelto and ASA (as of 11/4/2021), hypertension, COPD, hyperlipidemia, hypothyroidism, and she is overweight.  She presents from rehab with L1-3 numbness and pain and worsening proximal leg weakness over last 4-5 days.  Physical exam findings of bright and awake, oriented x3, chronic bilateral  strength weakness, worsening left and right IP (1/5) and quadricep weakness (3/5), BLE (4-/5), gross hyperreflexia and bilateral Babinski's.  Her imaging shows anterior epidural hematoma associated with her T12 compression fracture.      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   • Elevated cholesterol    • Eustachian tube dysfunction    • Heart disease    • Herpes simplex    • History of transfusion    • Hyperlipidemia    • Hypertension    • Hypothyroidism 12/23/2020   • Intrinsic asthma 12/23/2020   • Knee dislocation    • Labral tear of right hip joint    • Laryngitis sicca    • Laryngitis,  "chronic    • Left carotid bruit 3/9/2016   • MI (myocardial infarction) ([facility])    • Myalgia due to statin 6/25/2019   • Open wound of right hip 9/14/2021   • Osteomyelitis of right femur ([facility]) 7/6/2021   • Otorrhea    • Pacemaker 11/17/2016   • Primary osteoarthritis of left knee 12/23/2020   • Psoriasis vulgaris 12/23/2020   • S/P coronary artery stent placement 3/9/2016   • Sensorineural hearing loss    • Seropositive rheumatoid arthritis of multiple sites ([facility]) 12/27/2019    Overview:  -myochrysine '93-'96 -methotrexate '96--->11/98;r/s  restarted 2/99--> 8/14 (anemia) -sulfasalazine- not effective -penicillamine 6/98-->10/98; no effect -leflunomide 11/98--> - Humira '13-->didn't take - Enbrel 12/14-->3/15- no effect!   Last Assessment & Plan:  - \"aching all over\" because she had to be off her anti-rheumatic drugs for 2 weeks in preparation for her R knee surgery - he   • Sick sinus syndrome ([facility]) 12/27/2019   • Sjogren's disease ([facility])    • Spondylolisthesis of lumbar region 1/17/2018   • Syncope, recurrent 2/8/2021     Active Hospital Problems    Diagnosis    • Severe malnutrition ([facility])    • Epidural hematoma ([facility])      HPI: Appears to be resting comfortably.  Complains of bilateral hip pain, right > left.  No acute events overnight.    PLAN:   Neuro: Exam unchanged, bilateral lower extremity weakness    T12-L1/2 epidural hematoma from T12 fracure   Continue neurochecks every 4 hours   Resume heparin today.   [facility]    CV: No acute issues.  VS WNL   Xarelto stopped   On ASA 81mg and Heparin at this time.   Pulm: No acute issues.  Maintaining O2 sat.  : I/O Cath PRN   Bladder scans Q4 hrs.   FEN: Tolerating regular diet.  .     Severe Malnutrition.  Prealb 13.  Appreciate nutrition.   GI: No acute issues.  ORTHO: Plastics consulted.  Appreciate assistance  ID: WBC normal.  Continue Ancef.  Repeat CBC pending   CRP close to baseline 9.84   Will ask ID for DC " "recommendations.   Heme:  DVT prophylaxis with SCDs.  Hold Xarelto and begin aspirin 81mg.     H/H 7.3/23.7.  Given heart status and off of anticoaglation will give blood today.   Pain: Waxes and wanes.  Dispo: PT/OT   Plan for [facility] vs [facility] if possible.     CHIEF COMPLAINT:   L1-3 numbness and pain and worsening proximal leg weakness over last 4-5 days    Subjective  Stable.  Doing well    Temp:  [97.7 °F (36.5 °C)-98.4 °F (36.9 °C)] 98 °F (36.7 °C)  Heart Rate:  [64-75] 67  Resp:  [16-18] 18  BP: (115-187)/(51-74) 126/58    Objective:  General Appearance:  In no acute distress.    Vital signs: (most recent): Blood pressure 126/58, pulse 67, temperature 98 °F (36.7 °C), temperature source Oral, resp. rate 18, height 167.6 cm (66\"), weight 71.6 kg (157 lb 14.4 oz), SpO2 94 %, not currently breastfeeding.        Neurologic Exam     Mental Status   Oriented to person, place, and time.   Attention: normal. Concentration: normal.   Speech: speech is normal   Level of consciousness: alert    Bright and awake.  Oriented x3.  Follows commands without prompting.     Cranial Nerves     CN II   Visual fields full to confrontation.     CN III, IV, VI   Pupils are equal, round, and reactive to light.  Extraocular motions are normal.     CN V   Facial sensation intact.     CN VII   Facial expression full, symmetric.     CN VIII   CN VIII normal.     CN IX, X   CN IX normal.     CN XI   CN XI normal.     Motor Exam   Right arm tone: normal  Left arm tone: normal  Right leg tone: normal  Left leg tone: normal    Strength   Right deltoid: 5/5  Left deltoid: 5/5  Right biceps: 5/5  Left biceps: 5/5  Right triceps: 4/5  Left triceps: 4/5  Right wrist extension: 5/5  Left wrist extension: 5/5  Right interossei: 4/5  Left interossei: 4/5  Right iliopsoas: 1/5  Left iliopsoas: 2/5  Right quadriceps: 1/5  Left quadriceps: 2/5  Right anterior tibial: 2/5  Left anterior tibial: 4/5  Right gastroc: 2/5  Left gastroc: 4/5   "     Sensory Exam   Right arm light touch: normal  Left arm light touch: normal  Right leg light touch: normal  Left leg light touch: normal  Sensory deficit distribution on right: L1  Sensory deficit distribution on left: L1    Gait, Coordination, and Reflexes     Tremor   Resting tremor: absent  Intention tremor: absent  Action tremor: absent    Drains:   Urethral Catheter (Active)   Daily Indications Chronic Urinary Retention related to Obstructive, Infectious/Inflammatory, Neurologic or Traumatic Causes 11/04/21 2111   Site Assessment Clean; Skin intact 11/04/21 2111   Collection Container Standard drainage bag 11/04/21 2111   Securement Method Securing device 11/04/21 2111   Catheter care complete Yes 11/05/21 0600   Output (mL) 450 mL 11/05/21 0600       [REMOVED] Closed/Suction Drain Right;Anterior Thigh Bulb 15 Fr. (Removed)       [REMOVED] Urethral Catheter Silicone 16 Fr. (Removed)       [REMOVED] External Urinary Catheter (Removed)       [REMOVED] External Urinary Catheter (Removed)   Site Assessment Clean 10/24/21 0919   Application/Removal external catheter applied 10/24/21 0919   Collection Container Wall suction 10/24/21 0919   Securement Method Securing device 10/24/21 0919   Output (mL) 350 mL 10/24/21 1841       [REMOVED] External Urinary Catheter (Removed)   Site Assessment Clean; Skin intact 10/27/21 2013   Application/Removal external catheter changed 10/28/21 1025   Collection Container Wall suction 10/28/21 1025   Wall suction (mmHG) 80 mmHG 10/28/21 1025   Securement Method Securing device 10/28/21 1025   Catheter care complete Yes 10/27/21 0815   Output (mL) 400 mL 10/28/21 0300     Imaging Results (Last 24 Hours)     ** No results found for the last 24 hours. **        Lab Results (last 24 hours)     ** No results found for the last 24 hours. **        [id number]  Bandar Shea MD

## 2021-11-09 NOTE — CASE MANAGEMENT/SOCIAL WORK
Continued Stay Note   [place]     Patient Name: Tawny Shin  MRN: [id number]  Today's Date: 11/9/2021    Admit Date: 11/4/2021     Discharge Plan     Row Name 11/09/21 0915       Plan    Plan Spinal Cord injury rehab center vs SNF    Patient/Family in Agreement with Plan yes    Plan Comments Spoke with Ximena at [facility] [phone] and they have received requested referral form that was filled out yesterday from this  and returned to them. They will be reviewing and calling back this  re: decision.  Left message with Anupama Voss at [facility] [phone] to call this  back re: if referral received and if deicison made. Will follow.    1377- Gcjdbp called back from [facility] requesting additional information which was given verbally as well as faxed to them.  They did say they are running very behind on admissions at present. Will follow.                Discharge Codes    No documentation.                     BRIAN Muñoz

## 2021-11-09 NOTE — PLAN OF CARE
Problem: Adult Inpatient Plan of Care  Goal: Plan of Care Review  Outcome: Ongoing, Progressing  Flowsheets (Taken 11/9/2021 1218)  Progress: no change  Plan of Care Reviewed With: patient  Outcome Summary: A&Ox4, VSS. Q2 turns. Red/blanchable area on coccyx/sacral area. R hip dressing CDI. Rt upper extremity midline CDI. Q4 bladder scans, unable to void on own I&O cath needed once during the shift. Stated she wanted the kay back. C/o pain with good relief from prn pain medication. Bed alarm set, call light in reach, safety maintained.

## 2021-11-09 NOTE — PLAN OF CARE
Goal Outcome Evaluation:  Plan of Care Reviewed With: patient        Progress: no change  Outcome Summary: The patient presents alert and oriented x4. She has a flat affect, but is willing to work with therapy. She demonstrates continued significant weakness of B LEs with her hamstrings and plantarflexion being the strongest at 2/5. Her L leg is stronger grossly than the R LE but this is chronic from her previous signficiant orthopedic issues with her R LE. She reports a severe fear of falling and has a difficult time with leaning forward far enough to allow for a smooth slide board transfer. The patient will continue to benefit from PT to work on strengthening, motor function of her LEs, slide board transfer, and sitting balance. Recommend discharge to specialized inpt acute rehab for SCI so the patient can get full education on spinal cord injuries, learn tranfer skills, and self care skills so she can return home with care provided at home.

## 2021-11-09 NOTE — PLAN OF CARE
Goal Outcome Evaluation:              Outcome Summary: A&Ox4. VSS. C/o pain this shift with relief from prn medications. R hip dressing CDI. RUE midline CDI, capped. Bladder scans q4h, I&O cath performed x1 this shift. Pt has not voided by herself. Magic cream applied as ordered. Coccyx red and blanchable. Room air. Soft, ground diet. Turn q2h. Call light in reach. Safety maintained.

## 2021-11-09 NOTE — THERAPY TREATMENT NOTE
Patient Name: Tawny Shin  : 1955    MRN: [id number]                              Today's Date: 2021       Admit Date: 2021    Visit Dx:     ICD-10-CM ICD-9-CM   1. Impaired mobility  Z74.09 799.89   2. Impaired mobility and ADLs  Z74.09 V49.89    Z78.9    3. Dysphagia, unspecified type  R13.10 787.20     Patient Active Problem List   Diagnosis   • T12 compression fracture, initial encounter ([facility])   • Chronic embolism and thrombosis of unspecified deep veins of left lower extremity ([facility])   • Urinary tract infection without hematuria   • Acute bilateral thoracic back pain   • Chronic anticoagulation   • Hypokalemia   • Transaminitis   • Iron deficiency anemia   • Osteoporosis   • Bacteremia   • Moderate malnutrition (CMS/[facility])   • Epidural hematoma ([facility])     Past Medical History:   Diagnosis Date   • Age-related osteoporosis with current pathological fracture 2020   • Arthritis    • Asthma    • Bilateral bunions 2020   • Cancer ([facility])    • Cardiac pacemaker syndrome 2020    Overview:  - heart block - implanted    • Charcot's joint of foot, left 2020   • Chronic deep vein thrombosis (DVT) of right lower extremity ([facility]) 2021   • Chronic pain syndrome 2021   • Chronic sinusitis    • COPD (chronic obstructive pulmonary disease) ([facility])    • Coronary artery disease    • 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   • Elevated cholesterol    • Eustachian tube dysfunction    • Heart disease    • Herpes simplex    • History of transfusion    • Hyperlipidemia    • Hypertension    • Hypothyroidism 2020   • Intrinsic asthma 2020   • Knee dislocation    • Labral tear of right hip joint    • Laryngitis sicca    • Laryngitis, chronic    • Left carotid bruit 3/9/2016   • MI (myocardial infarction) ([facility])    • Myalgia due to statin 2019   • Open wound of right hip 2021   • Osteomyelitis of right femur ([facility]) 2021   • Otorrhea    • Pacemaker 2016  "  • Primary osteoarthritis of left knee 12/23/2020   • Psoriasis vulgaris 12/23/2020   • S/P coronary artery stent placement 3/9/2016   • Sensorineural hearing loss    • Seropositive rheumatoid arthritis of multiple sites (HCC) 12/27/2019    Overview:  -myochrysine '93-'96 -methotrexate '96--->11/98;r/s  restarted 2/99--> 8/14 (anemia) -sulfasalazine- not effective -penicillamine 6/98-->10/98; no effect -leflunomide 11/98--> - Humira '13-->didn't take - Enbrel 12/14-->3/15- no effect!   Last Assessment & Plan:  - \"aching all over\" because she had to be off her anti-rheumatic drugs for 2 weeks in preparation for her R knee surgery - he   • Sick sinus syndrome (HCC) 12/27/2019   • Sjogren's disease ([facility])    • Spondylolisthesis of lumbar region 1/17/2018   • Syncope, recurrent 2/8/2021     Past Surgical History:   Procedure Laterality Date   • A-V CARDIAC PACEMAKER INSERTION  2016   • ATRIAL CARDIAC PACEMAKER INSERTION     • CARDIAC CATHETERIZATION     • CATARACT EXTRACTION     • CERVICAL CORPECTOMY N/A 3/3/2021    Procedure: CERVICAL 6 CORPECTOMY WITH TITANIUM CAGE WITH NEURO MONITORING;  Surgeon: Bandar Shea MD;  Location: [facility] OR;  Service: Neurosurgery;  Laterality: N/A;   • COLONOSCOPY  11/08/2011    One fold in the ascending colon which showed ulcer otherwise normal exam   • COLONOSCOPY  11/12/2004    Normal exam repeat in five years   • CORONARY ANGIOPLASTY WITH STENT PLACEMENT      X 2; 2013 & 2014   • ENDOSCOPY  07/10/2014    Normal exam   • FLAP LEG Right 9/14/2021    Procedure: RIGHT GLUTEAL FASCIOCUTANEOUS ADVANCEMENT FLAP AND RIGHT TENSOR FASCIAL JESSICA FLAP;  Surgeon: Amadeo Turner MD;  Location:  [facility] OR;  Service: Plastics;  Laterality: Right;   • HIP ABDUCTION TENOTOMY BILATERAL Right 1/14/2021    Procedure: RIGHT HIP GLUTEUS MEDLUS / MINIMUS REPAIR, POSSIBLE ACHILLES ALLOGRAFT;  Surgeon: Nino Carlson MD;  Location:  [facility] OR;  Service: Orthopedics;  Laterality: Right;   • INCISION " AND DRAINAGE HIP Right 2/9/2021    Procedure: HIP INCISION AND DRAINAGE;  Surgeon: Nino Carlson MD;  Location:  [facility] OR;  Service: Orthopedics;  Laterality: Right;   • INCISION AND DRAINAGE LEG Right 10/24/2021    Procedure: INCISION AND DRAINAGE LOWER EXTREMITY;  Surgeon: Amadeo Turner MD;  Location:  [facility] OR;  Service: Plastics;  Laterality: Right;   • INCISION AND DRAINAGE OF WOUND Right 7/8/2021    Procedure: INCISION AND DRAINAGE WOUND RIGHT HIP;  Surgeon: James Huntley MD;  Location:  [facility] OR;  Service: Orthopedics;  Laterality: Right;   • JOINT REPLACEMENT     • KYPHOPLASTY WITH BIOPSY Bilateral 10/26/2021    Procedure: THOARCIC 12 KYPHOPLASTY WITH BIOPSY;  Surgeon: Bandar Shea MD;  Location:  [facility] OR;  Service: Neurosurgery;  Laterality: Bilateral;   • LEG DEBRIDEMENT Right 9/14/2021    Procedure: DEBRIDEMENT OF RIGHT HIP WOUND, RIGHT GLUTEAL FASCIOCUTANEOUS ADVANCEMENT FLAP AND RIGHT TENSOR FASCIAL JESSICA FLAP;  Surgeon: Amadeo Turner MD;  Location:  [facility] OR;  Service: Plastics;  Laterality: Right;   • LUMBAR DISCECTOMY Right 3/23/2021    Procedure: LUMBAR DISCECTOMY MICRO, Lumbar 1/2 right;  Surgeon: Bandar Shea MD;  Location:  [facility] OR;  Service: Neurosurgery;  Laterality: Right;   • LUMBAR FUSION N/A 1/19/2018    Procedure: L3-4,L4-5 DECOMPRESSION, POSTERIOR SPINAL FUSION WITH INSTRUMENTATION;  Surgeon: Fortino Oropeza MD;  Location:  [facility] OR;  Service:    • LUMBAR LAMINECTOMY WITH FUSION Left 1/17/2018    Procedure: LEFT L3-4 L4-5 LATERAL LUMBAR INTERBODY FUSION;  Surgeon: Fortino Oropeza MD;  Location:  [facility] OR;  Service:    • MYRINGOTOMY W/ TUBES  09/04/2014    TUBES NO LONGER IN PLACE   • OTHER SURGICAL HISTORY      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 so hardware was removed and spacers were placed   • REPLACEMENT TOTAL KNEE Right       General Information     Row Name 11/09/21 1051          General Information    Existing Precautions/Restrictions  fall; spinal; brace worn when out of bed  can wear TLSO for comfort if needed  -MS     Barriers to Rehab medically complex; physical barrier; impaired sensation  -MS     Row Name 11/09/21 1052          Cognition    Orientation Status (Cognition) oriented x 4  -MS           User Key  (r) = Recorded By, (t) = Taken By, (c) = Cosigned By    Initials Name Provider Type    MS Nicole Olson, PT, DPT, NCS Physical Therapist               Mobility     Row Name 11/09/21 1052          Bed Mobility    Bed Mobility rolling right; sidelying-sit  -MS     Rolling Right Sac (Bed Mobility) minimum assist (75% patient effort); verbal cues; nonverbal cues (demo/gesture)  -MS     Scooting/Bridging Sac (Bed Mobility) moderate assist (50% patient effort); verbal cues; nonverbal cues (demo/gesture)  -MS     Sidelying-Sit Sac (Bed Mobility) moderate assist (50% patient effort); verbal cues; nonverbal cues (demo/gesture)  -MS     Assistive Device (Bed Mobility) bed rails; draw sheet; head of bed elevated  -MS     Row Name 11/09/21 1052          Transfers    Comment (Transfers) slide board transfer from bed to chair and chair to bed going to her L side due to her open R hip wound. Pt limited by fear of falling with poor forward lean, To get back to bed, a squat piviot was performed dependent x2. Pt was less fearful to lean forward  -MS     Row Name 11/09/21 1052          Bed-Chair Transfer    Bed-Chair Sac (Transfers) dependent (less than 25% patient effort); 2 person assist; verbal cues; nonverbal cues (demo/gesture)  -MS     Assistive Device (Bed-Chair Transfers) wheelchair  slide board  -MS           User Key  (r) = Recorded By, (t) = Taken By, (c) = Cosigned By    Initials Name Provider Type    Nicole Palacios, PT, DPT, NCS Physical Therapist               Obj/Interventions     Row Name 11/09/21 1052          Motor Skills    Therapeutic Exercise knee; hip; ankle  -MS     Row Name 11/09/21 1052           Hip (Therapeutic Exercise)    Hip (Therapeutic Exercise) AAROM (active assistive range of motion)  -MS     Hip AAROM (Therapeutic Exercise) bilateral; flexion; extension; external rotation; internal rotation; supine; 5 repetitions  -MS     Row Name 11/09/21 1052          Knee (Therapeutic Exercise)    Knee (Therapeutic Exercise) AAROM (active assistive range of motion)  -MS     Knee AAROM (Therapeutic Exercise) bilateral; flexion; extension; supine; 10 repetitions  -MS     Row Name 11/09/21 1052          Ankle (Therapeutic Exercise)    Ankle (Therapeutic Exercise) AAROM (active assistive range of motion)  -MS     Ankle AAROM (Therapeutic Exercise) bilateral; dorsiflexion; plantarflexion; supine; 10 repetitions  -MS     Row Name 11/09/21 1052          Balance    Static Sitting Balance moderate impairment; supported; sitting, edge of bed  able to maintain with 1 UE support for 10 sec. otherwise uses 2 UE support and is steady  -MS     Row Name 11/09/21 1052          Sensory Assessment (Somatosensory)    Sensory Assessment (Somatosensory) --  pt does have sporatic proprioceptive and kinesthesia loss in B LEs. She does not attend to long testing processes  -MS           User Key  (r) = Recorded By, (t) = Taken By, (c) = Cosigned By    Initials Name Provider Type    MS Nicole Olson R, PT, DPT, NCS Physical Therapist               Goals/Plan    No documentation.                Clinical Impression     Row Name 11/09/21 1052          Pain    Additional Documentation Pain Scale: Numbers Pre/Post-Treatment (Group)  -MS     Row Name 11/09/21 1052          Pain Scale: Numbers Pre/Post-Treatment    Pretreatment Pain Rating 5/10  -MS     Pain Location - Side Bilateral  -MS     Pain Location hip  -MS     Pre/Posttreatment Pain Comment pt increased with sitting  -MS     Pain Intervention(s) Repositioned; Ambulation/increased activity; Medication (See MAR)  -MS     Row Name 11/09/21 1052          Plan of Care Review     Plan of Care Reviewed With patient  -MS     Progress no change  -MS     Outcome Summary The patient presents alert and oriented x4. She has a flat affect, but is willing to work with therapy. She demonstrates continued significant weakness of B LEs with her hamstrings and plantarflexion being the strongest at 2/5. Her L leg is stronger grossly than the R LE but this is chronic from her previous signficiant orthopedic issues with her R LE. She reports a severe fear of falling and has a difficult time with leaning forward far enough to allow for a smooth slide board transfer. The patient will continue to benefit from PT to work on strengthening, motor function of her LEs, slide board transfer, and sitting balance. Recommend discharge to specialized inpt acute rehab for SCI so the patient can get full education on spinal cord injuries, learn tranfer skills, and self care skills so she can return home with care provided at home.  -MS     Row Name 11/09/21 1052          Positioning and Restraints    In Bed fowlers; call light within reach; encouraged to call for assist; side rails up x2; with family/caregiver  -MS           User Key  (r) = Recorded By, (t) = Taken By, (c) = Cosigned By    Initials Name Provider Type    MS Nicole Olson R, PT, DPT, NCS Physical Therapist               Outcome Measures     Row Name 11/09/21 1052          How much help from another person do you currently need...    Turning from your back to your side while in flat bed without using bedrails? 2  -MS     Moving from lying on back to sitting on the side of a flat bed without bedrails? 2  -MS     Moving to and from a bed to a chair (including a wheelchair)? 2  -MS     Standing up from a chair using your arms (e.g., wheelchair, bedside chair)? 1  -MS     Climbing 3-5 steps with a railing? 1  -MS     To walk in hospital room? 1  -MS     AM-PAC 6 Clicks Score (PT) 9  -MS     Row Name 11/09/21 1052          Functional Assessment    Outcome  Measure Options AM-PAC 6 Clicks Basic Mobility (PT)  -MS           User Key  (r) = Recorded By, (t) = Taken By, (c) = Cosigned By    Initials Name Provider Type    MS Nicole Olson EVERARDO, PT, DPT, NCS Physical Therapist                             Physical Therapy Education                 Title: PT OT SLP Therapies (In Progress)     Topic: Physical Therapy (In Progress)     Point: Mobility training (In Progress)     Learning Progress Summary           Patient Acceptance, E, NR by MS at 11/9/2021 1156    Comment: slide board transfer    Acceptance, E, VU,NR by  at 11/4/2021 1509    Comment: Educated pt on spinal precautions, proper body mechanics during bed mobility transfers, POC, and d/c.                   Point: Home exercise program (Not Started)     Learner Progress:  Not documented in this visit.          Point: Body mechanics (Done)     Learning Progress Summary           Patient Acceptance, E, VU,NR by  at 11/4/2021 1509    Comment: Educated pt on spinal precautions, proper body mechanics during bed mobility transfers, POC, and d/c.                   Point: Precautions (Done)     Learning Progress Summary           Patient Acceptance, E,TB, VU by  at 11/7/2021 1027    Comment: positioning    Acceptance, E, VU,NR by  at 11/4/2021 1509    Comment: Educated pt on spinal precautions, proper body mechanics during bed mobility transfers, POC, and d/c.                               User Key     Initials Effective Dates Name Provider Type [facility] 06/16/21 -  Olga Chambers, PTA Physical Therapy Assistant PT    MS 06/19/18 -  Nicole Olson, PT, DPT, NCS Physical Therapist PT     09/07/21 -  Juliana Angela, PT Student PT Student PT              PT Recommendation and Plan     Plan of Care Reviewed With: patient  Progress: no change  Outcome Summary: The patient presents alert and oriented x4. She has a flat affect, but is willing to work with therapy. She demonstrates continued significant  weakness of B LEs with her hamstrings and plantarflexion being the strongest at 2/5. Her L leg is stronger grossly than the R LE but this is chronic from her previous signficiant orthopedic issues with her R LE. She reports a severe fear of falling and has a difficult time with leaning forward far enough to allow for a smooth slide board transfer. The patient will continue to benefit from PT to work on strengthening, motor function of her LEs, slide board transfer, and sitting balance. Recommend discharge to specialized inpt acute rehab for SCI so the patient can get full education on spinal cord injuries, learn tranfer skills, and self care skills so she can return home with care provided at home.     Time Calculation:    PT Charges     Row Name 11/09/21 1154             Time Calculation    Start Time 1052  -MS      Stop Time 1145  -MS      Time Calculation (min) 53 min  -MS      PT Received On 11/09/21  -MS              Time Calculation- PT    Total Timed Code Minutes- PT 53 minute(s)  -MS              Timed Charges    33988 - PT Therapeutic Exercise Minutes 23  -MS      29029 - PT Therapeutic Activity Minutes 30  -MS              Total Minutes    Timed Charges Total Minutes 53  -MS       Total Minutes 53  -MS            User Key  (r) = Recorded By, (t) = Taken By, (c) = Cosigned By    Initials Name Provider Type    MS Niocle Olson, PT, DPT, NCS Physical Therapist              Therapy Charges for Today     Code Description Service Date Service Provider Modifiers Qty    50726541974 HC PT THER PROC EA 15 MIN 11/9/2021 Nicole Olson PT, DPT, NCS GP 2    30940692292 HC PT THERAPEUTIC ACT EA 15 MIN 11/9/2021 Nicole Olson PT, DPT, NCS GP 2          PT G-Codes  Outcome Measure Options: AM-PAC 6 Clicks Basic Mobility (PT)  AM-PAC 6 Clicks Score (PT): 9  AM-PAC 6 Clicks Score (OT): 13    Nicole Olson PT, DPT, CHULA  11/9/2021

## 2021-11-10 PROCEDURE — 25010000002 HEPARIN (PORCINE) PER 1000 UNITS: Performed by: NEUROLOGICAL SURGERY

## 2021-11-10 PROCEDURE — 97110 THERAPEUTIC EXERCISES: CPT | Performed by: PHYSICAL THERAPIST

## 2021-11-10 PROCEDURE — 25010000002 CEFAZOLIN PER 500 MG: Performed by: NEUROLOGICAL SURGERY

## 2021-11-10 PROCEDURE — 97535 SELF CARE MNGMENT TRAINING: CPT

## 2021-11-10 PROCEDURE — 25010000002 CEFAZOLIN PER 500 MG: Performed by: INTERNAL MEDICINE

## 2021-11-10 PROCEDURE — 97530 THERAPEUTIC ACTIVITIES: CPT | Performed by: PHYSICAL THERAPIST

## 2021-11-10 PROCEDURE — 99024 POSTOP FOLLOW-UP VISIT: CPT | Performed by: NURSE PRACTITIONER

## 2021-11-10 RX ORDER — DULOXETIN HYDROCHLORIDE 30 MG/1
30 CAPSULE, DELAYED RELEASE ORAL DAILY
Status: DISCONTINUED | OUTPATIENT
Start: 2021-11-10 | End: 2021-11-12 | Stop reason: HOSPADM

## 2021-11-10 RX ORDER — DULOXETIN HYDROCHLORIDE 30 MG/1
60 CAPSULE, DELAYED RELEASE ORAL DAILY
Status: DISCONTINUED | OUTPATIENT
Start: 2021-11-17 | End: 2021-11-12 | Stop reason: HOSPADM

## 2021-11-10 RX ADMIN — HYDROCODONE BITARTRATE AND ACETAMINOPHEN 1 TABLET: 10; 325 TABLET ORAL at 08:59

## 2021-11-10 RX ADMIN — THIAMINE HCL TAB 100 MG 250 MG: 100 TAB at 09:00

## 2021-11-10 RX ADMIN — HYDROCODONE BITARTRATE AND ACETAMINOPHEN 1 TABLET: 10; 325 TABLET ORAL at 14:31

## 2021-11-10 RX ADMIN — HEPARIN SODIUM 5000 UNITS: 5000 INJECTION INTRAVENOUS; SUBCUTANEOUS at 08:59

## 2021-11-10 RX ADMIN — CEFAZOLIN 2 G: 10 INJECTION, POWDER, FOR SOLUTION INTRAVENOUS at 01:29

## 2021-11-10 RX ADMIN — VALACYCLOVIR HYDROCHLORIDE 500 MG: 500 TABLET, FILM COATED ORAL at 08:59

## 2021-11-10 RX ADMIN — TRAMADOL HYDROCHLORIDE 100 MG: 50 TABLET ORAL at 20:48

## 2021-11-10 RX ADMIN — MAGNESIUM GLUCONATE 500 MG ORAL TABLET 400 MG: 500 TABLET ORAL at 09:00

## 2021-11-10 RX ADMIN — HYDROCODONE BITARTRATE AND ACETAMINOPHEN 1 TABLET: 10; 325 TABLET ORAL at 04:41

## 2021-11-10 RX ADMIN — FLUTICASONE PROPIONATE 1 SPRAY: 50 SPRAY, METERED NASAL at 09:10

## 2021-11-10 RX ADMIN — DULOXETINE HYDROCHLORIDE 30 MG: 30 CAPSULE, DELAYED RELEASE ORAL at 20:52

## 2021-11-10 RX ADMIN — SUCRALFATE 1 G: 1 TABLET ORAL at 11:09

## 2021-11-10 RX ADMIN — SUCRALFATE 1 G: 1 TABLET ORAL at 17:07

## 2021-11-10 RX ADMIN — DOCUSATE SODIUM 50 MG AND SENNOSIDES 8.6 MG 2 TABLET: 8.6; 5 TABLET, FILM COATED ORAL at 20:49

## 2021-11-10 RX ADMIN — TRAMADOL HYDROCHLORIDE 100 MG: 50 TABLET ORAL at 08:59

## 2021-11-10 RX ADMIN — POLYETHYLENE GLYCOL 3350 17 G: 17 POWDER, FOR SOLUTION ORAL at 09:00

## 2021-11-10 RX ADMIN — HYDROCODONE BITARTRATE AND ACETAMINOPHEN 1 TABLET: 10; 325 TABLET ORAL at 19:18

## 2021-11-10 RX ADMIN — POTASSIUM CHLORIDE 20 MEQ: 10 CAPSULE, COATED, EXTENDED RELEASE ORAL at 08:59

## 2021-11-10 RX ADMIN — SUCRALFATE 1 G: 1 TABLET ORAL at 20:48

## 2021-11-10 RX ADMIN — CEFAZOLIN 2 G: 10 INJECTION, POWDER, FOR SOLUTION INTRAVENOUS at 15:18

## 2021-11-10 RX ADMIN — SUCRALFATE 1 G: 1 TABLET ORAL at 09:00

## 2021-11-10 RX ADMIN — LEVOTHYROXINE SODIUM 75 MCG: 75 TABLET ORAL at 06:15

## 2021-11-10 RX ADMIN — CEFAZOLIN 2 G: 10 INJECTION, POWDER, FOR SOLUTION INTRAVENOUS at 09:00

## 2021-11-10 RX ADMIN — TRAMADOL HYDROCHLORIDE 100 MG: 50 TABLET ORAL at 15:18

## 2021-11-10 RX ADMIN — DOCUSATE SODIUM 50 MG AND SENNOSIDES 8.6 MG 2 TABLET: 8.6; 5 TABLET, FILM COATED ORAL at 09:00

## 2021-11-10 RX ADMIN — ISOSORBIDE MONONITRATE 60 MG: 60 TABLET, EXTENDED RELEASE ORAL at 09:00

## 2021-11-10 RX ADMIN — DOCUSATE SODIUM 100 MG: 100 CAPSULE, LIQUID FILLED ORAL at 20:48

## 2021-11-10 RX ADMIN — FERROUS SULFATE TAB 325 MG (65 MG ELEMENTAL FE) 325 MG: 325 (65 FE) TAB at 09:00

## 2021-11-10 RX ADMIN — CARVEDILOL 25 MG: 25 TABLET, FILM COATED ORAL at 17:07

## 2021-11-10 RX ADMIN — CARVEDILOL 25 MG: 25 TABLET, FILM COATED ORAL at 09:00

## 2021-11-10 RX ADMIN — ISOSORBIDE MONONITRATE 60 MG: 60 TABLET, EXTENDED RELEASE ORAL at 20:48

## 2021-11-10 RX ADMIN — FOLIC ACID 1 MG: 1 TABLET ORAL at 09:00

## 2021-11-10 RX ADMIN — Medication 250 MG: at 09:00

## 2021-11-10 RX ADMIN — PANTOPRAZOLE SODIUM 40 MG: 40 TABLET, DELAYED RELEASE ORAL at 08:59

## 2021-11-10 RX ADMIN — FLUTICASONE PROPIONATE 1 SPRAY: 50 SPRAY, METERED NASAL at 20:49

## 2021-11-10 RX ADMIN — SODIUM CHLORIDE, PRESERVATIVE FREE 10 ML: 5 INJECTION INTRAVENOUS at 21:00

## 2021-11-10 RX ADMIN — HEPARIN SODIUM 5000 UNITS: 5000 INJECTION INTRAVENOUS; SUBCUTANEOUS at 20:49

## 2021-11-10 RX ADMIN — POTASSIUM CHLORIDE 20 MEQ: 10 CAPSULE, COATED, EXTENDED RELEASE ORAL at 20:48

## 2021-11-10 RX ADMIN — FUROSEMIDE 40 MG: 40 TABLET ORAL at 09:00

## 2021-11-10 RX ADMIN — DOCUSATE SODIUM 100 MG: 100 CAPSULE, LIQUID FILLED ORAL at 09:00

## 2021-11-10 NOTE — PLAN OF CARE
Goal Outcome Evaluation:  Plan of Care Reviewed With: patient        Progress: no change  Outcome Summary: Pt a&ox4. C/o pain tx with good relief from prn pain med. R hip dressing CDI. Pt still is not able to void, bladder scanned every 4-6 hours and I&O cathed as needed per order. Purewick was applied per family request. Q2 turn- pt has been compliant with this so far this shift. Barrier cream applied to coccyx and excoriated areas. VSS. Safety maintained.

## 2021-11-10 NOTE — PLAN OF CARE
Goal Outcome Evaluation:  Plan of Care Reviewed With: patient        Progress: no change  Outcome Summary: Pt c/o lower back pain as CONTI enters room. Pt is elevated to tolerable level for self feeding. Pt is able to tolerate HOB elevated 20-30 degrees with rest breaks. Pt min/mod A for bed mobility. Pt tolerated pillows under thighs to offload pressure at lower back. CONIT also suggested pt lay with HOB flat and elevate lower half of bed to trial for pain relief. Pt did assist with rolling to L. Pt tolerating sidelying position with bottom completely off loaded when CONTI exited room. Continue OT POC

## 2021-11-10 NOTE — PLAN OF CARE
Goal Outcome Evaluation:              Outcome Summary: Pt alert and oriented this shift.  C/o pain treated with PRN oral pain medication/  Right hip surgical incision dressed with gauze and medipore tape.  Pt is due to void.  She was in and out cath this shift with output of 450 ml.  Coccyx is red, nonblanchable and her bottom and leg folds are excoriated.  Barrier cream applied to areas.  Q2 turn.  Soft, ground diet.  Call light in reach, safety maintained.

## 2021-11-10 NOTE — PLAN OF CARE
Goal Outcome Evaluation:  Plan of Care Reviewed With: patient        Progress: improving  Outcome Summary: The patient demonstrates improvement in movement of her entire L LE and in R hip flexion. The only muscle group not kelsi is her R quad. She was able to perform AROM through a greater ROM in all joints, but still requires assist to complete available range. She was able to sit with less assist and with her hands supported on her knees with a R sway with self correction. She was limited in sitting due to radicular pain in B hips. Overall, she shows improvement in her motor function in B LEs. PT will continue to focus on B LEs strengthening, motor recruitment, sitting balance, and ability to self turn for pressure relief. Recommend specialized inpt acute rehab for SCI upon discharge.

## 2021-11-10 NOTE — PROGRESS NOTES
I stopped by to check on Mrs. Shin again today.  She was awake in bed and conversant.  She was very alert, much more so than she has been on my previous visits.  She is very understandably upset with her circumstances but seems to have a clear understanding of what is going on.    Examination of the right hip wound is reassuring.  Wound dimensions are unchanged.  New gauze packing is in place.  There is scant output.  Adjacent skin remains healthy appearing without induration or maceration.  No tenderness to palpation.  No underlying fluctuance.    I recommend continued local wound care with current regimen.    I will continue to follow Mrs. Shin during the course of her admission.

## 2021-11-10 NOTE — THERAPY TREATMENT NOTE
Patient Name: Tawny Shin  : 1955    MRN: [id number]                              Today's Date: 11/10/2021       Admit Date: 2021    Visit Dx:     ICD-10-CM ICD-9-CM   1. Impaired mobility  Z74.09 799.89   2. Impaired mobility and ADLs  Z74.09 V49.89    Z78.9    3. Dysphagia, unspecified type  R13.10 787.20     Patient Active Problem List   Diagnosis   • T12 compression fracture, initial encounter ([facility])   • Chronic embolism and thrombosis of unspecified deep veins of left lower extremity ([facility])   • Urinary tract infection without hematuria   • Acute bilateral thoracic back pain   • Chronic anticoagulation   • Hypokalemia   • Transaminitis   • Iron deficiency anemia   • Osteoporosis   • Bacteremia   • Moderate malnutrition (CMS/[facility])   • Epidural hematoma ([facility])   • Severe malnutrition ([facility])     Past Medical History:   Diagnosis Date   • Age-related osteoporosis with current pathological fracture 2020   • Arthritis    • Asthma    • Bilateral bunions 2020   • Cancer ([facility])    • Cardiac pacemaker syndrome 2020    Overview:  - heart block - implanted    • Charcot's joint of foot, left 2020   • Chronic deep vein thrombosis (DVT) of right lower extremity ([facility]) 2021   • Chronic pain syndrome 2021   • Chronic sinusitis    • COPD (chronic obstructive pulmonary disease) ([facility])    • Coronary artery disease    • 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   • Elevated cholesterol    • Eustachian tube dysfunction    • Heart disease    • Herpes simplex    • History of transfusion    • Hyperlipidemia    • Hypertension    • Hypothyroidism 2020   • Intrinsic asthma 2020   • Knee dislocation    • Labral tear of right hip joint    • Laryngitis sicca    • Laryngitis, chronic    • Left carotid bruit 3/9/2016   • MI (myocardial infarction) ([facility])    • Myalgia due to statin 2019   • Open wound of right hip 2021   • Osteomyelitis of right femur ([facility]) 2021   •  "Otorrhea    • Pacemaker 11/17/2016   • Primary osteoarthritis of left knee 12/23/2020   • Psoriasis vulgaris 12/23/2020   • S/P coronary artery stent placement 3/9/2016   • Sensorineural hearing loss    • Seropositive rheumatoid arthritis of multiple sites (HCC) 12/27/2019    Overview:  -myochrysine '93-'96 -methotrexate '96--->11/98;r/s  restarted 2/99--> 8/14 (anemia) -sulfasalazine- not effective -penicillamine 6/98-->10/98; no effect -leflunomide 11/98--> - Humira '13-->didn't take - Enbrel 12/14-->3/15- no effect!   Last Assessment & Plan:  - \"aching all over\" because she had to be off her anti-rheumatic drugs for 2 weeks in preparation for her R knee surgery - he   • Sick sinus syndrome ([facility]) 12/27/2019   • Sjogren's disease ([facility])    • Spondylolisthesis of lumbar region 1/17/2018   • Syncope, recurrent 2/8/2021     Past Surgical History:   Procedure Laterality Date   • A-V CARDIAC PACEMAKER INSERTION  2016   • ATRIAL CARDIAC PACEMAKER INSERTION     • CARDIAC CATHETERIZATION     • CATARACT EXTRACTION     • CERVICAL CORPECTOMY N/A 3/3/2021    Procedure: CERVICAL 6 CORPECTOMY WITH TITANIUM CAGE WITH NEURO MONITORING;  Surgeon: Bandar Shea MD;  Location: [facility] OR;  Service: Neurosurgery;  Laterality: N/A;   • COLONOSCOPY  11/08/2011    One fold in the ascending colon which showed ulcer otherwise normal exam   • COLONOSCOPY  11/12/2004    Normal exam repeat in five years   • CORONARY ANGIOPLASTY WITH STENT PLACEMENT      X 2; 2013 & 2014   • ENDOSCOPY  07/10/2014    Normal exam   • FLAP LEG Right 9/14/2021    Procedure: RIGHT GLUTEAL FASCIOCUTANEOUS ADVANCEMENT FLAP AND RIGHT TENSOR FASCIAL JESSICA FLAP;  Surgeon: Amadeo Turner MD;  Location:  [facility] OR;  Service: Plastics;  Laterality: Right;   • HIP ABDUCTION TENOTOMY BILATERAL Right 1/14/2021    Procedure: RIGHT HIP GLUTEUS MEDLUS / MINIMUS REPAIR, POSSIBLE ACHILLES ALLOGRAFT;  Surgeon: Nino Carlson MD;  Location:  [facility] OR;  Service: Orthopedics;  " Laterality: Right;   • INCISION AND DRAINAGE HIP Right 2/9/2021    Procedure: HIP INCISION AND DRAINAGE;  Surgeon: Nino Carlson MD;  Location:  [facility] OR;  Service: Orthopedics;  Laterality: Right;   • INCISION AND DRAINAGE LEG Right 10/24/2021    Procedure: INCISION AND DRAINAGE LOWER EXTREMITY;  Surgeon: Amadeo Turner MD;  Location:  [facility] OR;  Service: Plastics;  Laterality: Right;   • INCISION AND DRAINAGE OF WOUND Right 7/8/2021    Procedure: INCISION AND DRAINAGE WOUND RIGHT HIP;  Surgeon: James Huntley MD;  Location:  [facility] OR;  Service: Orthopedics;  Laterality: Right;   • JOINT REPLACEMENT     • KYPHOPLASTY WITH BIOPSY Bilateral 10/26/2021    Procedure: THOARCIC 12 KYPHOPLASTY WITH BIOPSY;  Surgeon: Bandar Shea MD;  Location:  [facility] OR;  Service: Neurosurgery;  Laterality: Bilateral;   • LEG DEBRIDEMENT Right 9/14/2021    Procedure: DEBRIDEMENT OF RIGHT HIP WOUND, RIGHT GLUTEAL FASCIOCUTANEOUS ADVANCEMENT FLAP AND RIGHT TENSOR FASCIAL JESSICA FLAP;  Surgeon: Amadeo Turner MD;  Location:  [facility] OR;  Service: Plastics;  Laterality: Right;   • LUMBAR DISCECTOMY Right 3/23/2021    Procedure: LUMBAR DISCECTOMY MICRO, Lumbar 1/2 right;  Surgeon: Bandar Shea MD;  Location: [facility] OR;  Service: Neurosurgery;  Laterality: Right;   • LUMBAR FUSION N/A 1/19/2018    Procedure: L3-4,L4-5 DECOMPRESSION, POSTERIOR SPINAL FUSION WITH INSTRUMENTATION;  Surgeon: Fortino Oropeza MD;  Location: [facility] OR;  Service:    • LUMBAR LAMINECTOMY WITH FUSION Left 1/17/2018    Procedure: LEFT L3-4 L4-5 LATERAL LUMBAR INTERBODY FUSION;  Surgeon: Fortino Oropeza MD;  Location: [facility] OR;  Service:    • MYRINGOTOMY W/ TUBES  09/04/2014    TUBES NO LONGER IN PLACE   • OTHER SURGICAL HISTORY      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 so hardware was removed and spacers were placed   • REPLACEMENT TOTAL KNEE Right       General Information     Row Name 11/10/21 8956          Physical Therapy Time and  Intention    Document Type therapy note (daily note)  -MS     Mode of Treatment physical therapy; individual therapy  -MS     Row Name 11/10/21 1347          General Information    Patient Profile Reviewed yes  -MS     Row Name 11/10/21 1345          Cognition    Orientation Status (Cognition) oriented x 4  -MS     Row Name 11/10/21 134          Safety Issues, Functional Mobility    Impairments Affecting Function (Mobility) balance; endurance/activity tolerance; pain; range of motion (ROM); sensation/sensory awareness; strength; motor control; postural/trunk control; muscle tone abnormal  -MS           User Key  (r) = Recorded By, (t) = Taken By, (c) = Cosigned By    Initials Name Provider Type    MS Nicole Olson, PT, DPT, NCS Physical Therapist               Mobility     Row Name 11/10/21 6694          Bed Mobility    Bed Mobility rolling left; rolling right; sidelying-sit-sidelying; scooting/bridging  -MS     Rolling Left [place] (Bed Mobility) minimum assist (75% patient effort)  -MS     Rolling Right [place] (Bed Mobility) moderate assist (50% patient effort)  -MS     Scooting/Bridging [place] (Bed Mobility) maximum assist (25% patient effort); 2 person assist  -MS     Sidelying-Sit [place] (Bed Mobility) minimum assist (75% patient effort)  with HOB elevated  -MS     Sit-Sidelying [place] (Bed Mobility) moderate assist (50% patient effort)  Mod A for LEs  -MS     Assistive Device (Bed Mobility) bed rails; draw sheet; head of bed elevated  -MS           User Key  (r) = Recorded By, (t) = Taken By, (c) = Cosigned By    Initials Name Provider Type    Nicole Palacios, PT, DPT, NCS Physical Therapist               Obj/Interventions     Row Name 11/10/21 9276          Hip (Therapeutic Exercise)    Hip (Therapeutic Exercise) AAROM (active assistive range of motion)  -MS     Hip AAROM (Therapeutic Exercise) bilateral; flexion; extension; external rotation; internal rotation; supine; 10  repetitions  -MS     Row Name 11/10/21 1349          Knee (Therapeutic Exercise)    Knee (Therapeutic Exercise) AAROM (active assistive range of motion)  -MS     Knee AAROM (Therapeutic Exercise) bilateral; flexion; extension; supine; 10 repetitions  -MS     Row Name 11/10/21 1340          Ankle (Therapeutic Exercise)    Ankle (Therapeutic Exercise) AAROM (active assistive range of motion)  -MS     Ankle AAROM (Therapeutic Exercise) bilateral; dorsiflexion; plantarflexion; supine; 10 repetitions  -MS     Row Name 11/10/21 9493          Balance    Balance Assessment sitting static balance  -MS     Static Sitting Balance moderate impairment; supported  sitting with hands on knees with leaning to R at times but able to self correct. Tolerated for 3 min before laying down due to pain  -MS           User Key  (r) = Recorded By, (t) = Taken By, (c) = Cosigned By    Initials Name Provider Type    MS Olson Nicole R, PT, DPT, NCS Physical Therapist               Goals/Plan    No documentation.                Clinical Impression     Row Name 11/10/21 0326          Pain    Additional Documentation Pain Scale: Numbers Pre/Post-Treatment (Group)  -MS     Row Name 11/10/21 4986          Pain Scale: Numbers Pre/Post-Treatment    Pretreatment Pain Rating 6/10  -MS     Posttreatment Pain Rating 8/10  -MS     Pain Location back  -MS     Pre/Posttreatment Pain Comment radiating into B hips once sitting  -MS     Pain Intervention(s) Repositioned; Ambulation/increased activity  -MS     Row Name 11/10/21 5163          Plan of Care Review    Plan of Care Reviewed With patient  -MS     Progress improving  -MS     Outcome Summary The patient demonstrates improvement in movement of her entire L LE and in R hip flexion. The only muscle group not kelsi is her R quad. She was able to perform AROM through a greater ROM in all joints, but still requires assist to complete available range. She was able to sit with less assist and with her  hands supported on her knees with a R sway with self correction. She was limited in sitting due to radicular pain in B hips. Overall, she shows improvement in her motor function in B LEs. PT will continue to focus on B LEs strengthening, motor recruitment, sitting balance, and ability to self turn for pressure relief. Recommend specialized inpt acute rehab for SCI upon discharge.  -MS     Row Name 11/10/21 1345          Positioning and Restraints    Post Treatment Position bed  -MS     In Bed side lying left; call light within reach; encouraged to call for assist; side rails up x2; with family/caregiver; pillow between legs  -MS           User Key  (r) = Recorded By, (t) = Taken By, (c) = Cosigned By    Initials Name Provider Type    MS Nicole Olson, PT, DPT, NCS Physical Therapist               Outcome Measures     Row Name 11/10/21 1450          How much help from another person do you currently need...    Turning from your back to your side while in flat bed without using bedrails? 2  -MS     Moving from lying on back to sitting on the side of a flat bed without bedrails? 2  -MS     Moving to and from a bed to a chair (including a wheelchair)? 2  -MS     Standing up from a chair using your arms (e.g., wheelchair, bedside chair)? 1  -MS     Climbing 3-5 steps with a railing? 1  -MS     To walk in hospital room? 1  -MS     AM-PAC 6 Clicks Score (PT) 9  -MS           User Key  (r) = Recorded By, (t) = Taken By, (c) = Cosigned By    Initials Name Provider Type    Nicole Palacios, PT, DPT, NCS Physical Therapist                             Physical Therapy Education                 Title: PT OT SLP Therapies (In Progress)     Topic: Physical Therapy (In Progress)     Point: Mobility training (Done)     Learning Progress Summary           Patient Acceptance, E, VU by MS at 11/10/2021 1452    Comment: need for rest breaks between exercise to allow for better movement    Acceptance, E, NR by MS at 11/9/2021 5006     Comment: slide board transfer    Acceptance, E, VU,NR by  at 11/4/2021 1509    Comment: Educated pt on spinal precautions, proper body mechanics during bed mobility transfers, POC, and d/c.                   Point: Home exercise program (Not Started)     Learner Progress:  Not documented in this visit.          Point: Body mechanics (Done)     Learning Progress Summary           Patient Acceptance, E, VU,NR by  at 11/4/2021 1509    Comment: Educated pt on spinal precautions, proper body mechanics during bed mobility transfers, POC, and d/c.                   Point: Precautions (Done)     Learning Progress Summary           Patient Acceptance, E,TB, VU by  at 11/7/2021 1027    Comment: positioning    Acceptance, E, VU,NR by  at 11/4/2021 1509    Comment: Educated pt on spinal precautions, proper body mechanics during bed mobility transfers, POC, and d/c.                               User Key     Initials Effective Dates Name Provider Type Discipline     06/16/21 -  Olga Chambers, PTA Physical Therapy Assistant PT    MS 06/19/18 -  Nicole Olson, PT, DPT, NCS Physical Therapist PT     09/07/21 -  Juliana Angela, PT Student PT Student PT              PT Recommendation and Plan     Plan of Care Reviewed With: patient  Progress: improving  Outcome Summary: The patient demonstrates improvement in movement of her entire L LE and in R hip flexion. The only muscle group not kelsi is her R quad. She was able to perform AROM through a greater ROM in all joints, but still requires assist to complete available range. She was able to sit with less assist and with her hands supported on her knees with a R sway with self correction. She was limited in sitting due to radicular pain in B hips. Overall, she shows improvement in her motor function in B LEs. PT will continue to focus on B LEs strengthening, motor recruitment, sitting balance, and ability to self turn for pressure relief. Recommend  specialized inpt acute rehab for SCI upon discharge.     Time Calculation:    PT Charges     Row Name 11/10/21 1450             Time Calculation    Start Time 1345  -MS      Stop Time 1440  -MS      Time Calculation (min) 55 min  -MS      PT Received On 11/10/21  -MS              Time Calculation- PT    Total Timed Code Minutes- PT 55 minute(s)  -MS              Timed Charges    63926 - PT Therapeutic Exercise Minutes 46  -MS      29946 - PT Therapeutic Activity Minutes 9  -MS              Total Minutes    Timed Charges Total Minutes 55  -MS       Total Minutes 55  -MS            User Key  (r) = Recorded By, (t) = Taken By, (c) = Cosigned By    Initials Name Provider Type    MS Nicole Olson, PT, DPT, NCS Physical Therapist              Therapy Charges for Today     Code Description Service Date Service Provider Modifiers Qty    10380369623 HC PT THER PROC EA 15 MIN 11/9/2021 Nicole Olson PT, DPT, NCS GP 2    15751964501 HC PT THERAPEUTIC ACT EA 15 MIN 11/9/2021 Nicole Olson, PT, DPT, NCS GP 2    19993458681 HC PT THER PROC EA 15 MIN 11/10/2021 Nicole Olson, PT, DPT, NCS GP 3    71365618888 HC PT THERAPEUTIC ACT EA 15 MIN 11/10/2021 Nicole Olson, PT, DPT, NCS GP 1          PT G-Codes  Outcome Measure Options: AM-PAC 6 Clicks Basic Mobility (PT)  AM-PAC 6 Clicks Score (PT): 9  AM-PAC 6 Clicks Score (OT): 14    Nicole Olson PT, DPT, CHULA  11/10/2021

## 2021-11-10 NOTE — CASE MANAGEMENT/SOCIAL WORK
Spoke with Chelsea from [facility] and they say pt looks good to accept but they have at least a 2 week or longer waiting list- [phone].  Hakeem from [facility]-[phone] told this SW yesterday that they have a very LONG waiting lists and no telling when it could happen.  Informed Jossie Campos CM of situation. Will speak with pt re: other options while waiting on these spinal cord facilities. Will follow.     0931- Pt has been listed at [facility].  Will follow.     1211- Referrals given to [facility] and [facility] in [facility].  Will follow.     1515- Kristi from [facility] called back [phone] and they will not be able to consider pt at present due to pt not being able to tolerate 3 hours of therapy. If this changes they will reconsider.

## 2021-11-10 NOTE — PROGRESS NOTES
NEUROSURGERY DAILY PROGRESS NOTE    ASSESSMENT:   Tawny Shin is a 66 y.o. with a significant medical history of prior L3-5 posterior lumbar fusion (Dr. Oropeza 2018), MSSA osteomyelitis of right femur (7/6/2021), C6 corpectomy (3/3/2021), L1-2 osteomyelitis/discitis requiring L1 microdiscectomy (3/23/2021), and T12 kyphoplasty (10/26/2021), rheumatoid arthritis on disease modifying agents, osteoporosis, Sjogren's disease, MI, CAD with stenting, DVT right lower extremity currently on Xarelto and ASA (as of 11/4/2021), hypertension, COPD, hyperlipidemia, hypothyroidism, and she is overweight.  She presents from rehab with L1-3 numbness and pain and worsening proximal leg weakness over last 4-5 days.  Physical exam findings of bright and awake, oriented x3, chronic bilateral  strength weakness, worsening left and right IP (1/5) and quadricep weakness (3/5), BLE (4-/5), gross hyperreflexia and bilateral Babinski's.  Her imaging shows anterior epidural hematoma associated with her T12 compression fracture.      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   • Elevated cholesterol    • Eustachian tube dysfunction    • Heart disease    • Herpes simplex    • History of transfusion    • Hyperlipidemia    • Hypertension    • Hypothyroidism 12/23/2020   • Intrinsic asthma 12/23/2020   • Knee dislocation    • Labral tear of right hip joint    • Laryngitis sicca    • Laryngitis,  "chronic    • Left carotid bruit 3/9/2016   • MI (myocardial infarction) ([facility])    • Myalgia due to statin 6/25/2019   • Open wound of right hip 9/14/2021   • Osteomyelitis of right femur ([facility]) 7/6/2021   • Otorrhea    • Pacemaker 11/17/2016   • Primary osteoarthritis of left knee 12/23/2020   • Psoriasis vulgaris 12/23/2020   • S/P coronary artery stent placement 3/9/2016   • Sensorineural hearing loss    • Seropositive rheumatoid arthritis of multiple sites ([facility]) 12/27/2019    Overview:  -myochrysine '93-'96 -methotrexate '96--->11/98;r/s  restarted 2/99--> 8/14 (anemia) -sulfasalazine- not effective -penicillamine 6/98-->10/98; no effect -leflunomide 11/98--> - Humira '13-->didn't take - Enbrel 12/14-->3/15- no effect!   Last Assessment & Plan:  - \"aching all over\" because she had to be off her anti-rheumatic drugs for 2 weeks in preparation for her R knee surgery - he   • Sick sinus syndrome ([facility]) 12/27/2019   • Sjogren's disease ([facility])    • Spondylolisthesis of lumbar region 1/17/2018   • Syncope, recurrent 2/8/2021     Active Hospital Problems    Diagnosis    • Severe malnutrition ([facility])    • Epidural hematoma ([facility])      HPI: Appears to be resting comfortably.  Complains of bilateral hip pain, right > left.  No acute events overnight.    PLAN:   Neuro: Exam unchanged, bilateral lower extremity weakness    T12-L1/2 epidural hematoma from T12 fracure   Continue neurochecks every 4 hours   Resume heparin today.   [facility]    CV: No acute issues.  VS WNL   Xarelto stopped   On ASA 81mg and Heparin at this time.   Pulm: No acute issues.  Maintaining O2 sat.  : I/O Cath PRN.  Bladder scans Q4 hrs.   FEN: Severe Malnutrition.  Prealb 13.  Appreciate nutrition.    CMP in am  GI: No acute issues.  ORTHO: Plastics consulted.  Appreciate assistance  ID: WBC normal.  Repeat CBC in am.     CRP close to baseline 9.84   Continue Ancef per ID for a total of 4 week; day #18 of 28   Heme:  DVT " "prophylaxis with SCDs.  Hold Xarelto and begin aspirin 81mg.    Pain: Waxes and wanes.  Dispo: PT/OT   Plan for [facility] if possible.     CHIEF COMPLAINT:   L1-3 numbness and pain and worsening proximal leg weakness over last 4-5 days    Subjective  Stable.  Doing well    Temp:  [98 °F (36.7 °C)-98.6 °F (37 °C)] 98 °F (36.7 °C)  Heart Rate:  [64-76] 66  Resp:  [16-20] 18  BP: (127-159)/(45-62) 139/62    Objective:  General Appearance:  In no acute distress.    Vital signs: (most recent): Blood pressure 139/62, pulse 66, temperature 98 °F (36.7 °C), temperature source Oral, resp. rate 18, height 167.6 cm (66\"), weight 71.6 kg (157 lb 14.4 oz), SpO2 96 %, not currently breastfeeding.        Neurologic Exam     Mental Status   Oriented to person, place, and time.   Attention: normal. Concentration: normal.   Speech: speech is normal   Level of consciousness: alert    Bright and awake.  Oriented x3.  Follows commands without prompting.     Cranial Nerves     CN II   Visual fields full to confrontation.     CN III, IV, VI   Pupils are equal, round, and reactive to light.  Extraocular motions are normal.     CN V   Facial sensation intact.     CN VII   Facial expression full, symmetric.     CN VIII   CN VIII normal.     CN IX, X   CN IX normal.     CN XI   CN XI normal.     Motor Exam   Right arm tone: normal  Left arm tone: normal  Right leg tone: normal  Left leg tone: normal    Strength   Right deltoid: 5/5  Left deltoid: 5/5  Right biceps: 5/5  Left biceps: 5/5  Right triceps: 4/5  Left triceps: 4/5  Right wrist extension: 5/5  Left wrist extension: 5/5  Right interossei: 4/5  Left interossei: 4/5  Right iliopsoas: 1/5  Left iliopsoas: 2/5  Right quadriceps: 1/5  Left quadriceps: 2/5  Right anterior tibial: 2/5  Left anterior tibial: 4/5  Right gastroc: 2/5  Left gastroc: 4/5       Sensory Exam   Right arm light touch: normal  Left arm light touch: normal  Right leg light touch: normal  Left leg " light touch: normal  Sensory deficit distribution on right: L1  Sensory deficit distribution on left: L1    Gait, Coordination, and Reflexes     Tremor   Resting tremor: absent  Intention tremor: absent  Action tremor: absent    Drains:   Urethral Catheter (Active)   Daily Indications Chronic Urinary Retention related to Obstructive, Infectious/Inflammatory, Neurologic or Traumatic Causes 11/04/21 2111   Site Assessment Clean; Skin intact 11/04/21 2111   Collection Container Standard drainage bag 11/04/21 2111   Securement Method Securing device 11/04/21 2111   Catheter care complete Yes 11/05/21 0600   Output (mL) 450 mL 11/05/21 0600       [REMOVED] Closed/Suction Drain Right;Anterior Thigh Bulb 15 Fr. (Removed)       [REMOVED] Urethral Catheter Silicone 16 Fr. (Removed)       [REMOVED] External Urinary Catheter (Removed)       [REMOVED] External Urinary Catheter (Removed)   Site Assessment Clean 10/24/21 0919   Application/Removal external catheter applied 10/24/21 0919   Collection Container Wall suction 10/24/21 0919   Securement Method Securing device 10/24/21 0919   Output (mL) 350 mL 10/24/21 1841       [REMOVED] External Urinary Catheter (Removed)   Site Assessment Clean; Skin intact 10/27/21 2013   Application/Removal external catheter changed 10/28/21 1025   Collection Container Wall suction 10/28/21 1025   Wall suction (mmHG) 80 mmHG 10/28/21 1025   Securement Method Securing device 10/28/21 1025   Catheter care complete Yes 10/27/21 0815   Output (mL) 400 mL 10/28/21 0300     Imaging Results (Last 24 Hours)     ** No results found for the last 24 hours. **        Lab Results (last 24 hours)     ** No results found for the last 24 hours. **        [id number]  Taiwo Prado, APRN

## 2021-11-10 NOTE — THERAPY TREATMENT NOTE
Acute Care - Occupational Therapy Treatment Note  [facility]     Patient Name: Tawny Shin  : 1955  MRN: [id number]  Today's Date: 11/10/2021             Admit Date: 2021       ICD-10-CM ICD-9-CM   1. Impaired mobility  Z74.09 799.89   2. Impaired mobility and ADLs  Z74.09 V49.89    Z78.9    3. Dysphagia, unspecified type  R13.10 787.20     Patient Active Problem List   Diagnosis   • T12 compression fracture, initial encounter ([facility])   • Chronic embolism and thrombosis of unspecified deep veins of left lower extremity ([facility])   • Urinary tract infection without hematuria   • Acute bilateral thoracic back pain   • Chronic anticoagulation   • Hypokalemia   • Transaminitis   • Iron deficiency anemia   • Osteoporosis   • Bacteremia   • Moderate malnutrition (CMS/[facility])   • Epidural hematoma ([facility])   • Severe malnutrition ([facility])     Past Medical History:   Diagnosis Date   • Age-related osteoporosis with current pathological fracture 2020   • Arthritis    • Asthma    • Bilateral bunions 2020   • Cancer ([facility])    • Cardiac pacemaker syndrome 2020    Overview:  - heart block - implanted    • Charcot's joint of foot, left 2020   • Chronic deep vein thrombosis (DVT) of right lower extremity ([facility]) 2021   • Chronic pain syndrome 2021   • Chronic sinusitis    • COPD (chronic obstructive pulmonary disease) ([facility])    • Coronary artery disease    • 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   • Elevated cholesterol    • Eustachian tube dysfunction    • Heart disease    • Herpes simplex    • History of transfusion    • Hyperlipidemia    • Hypertension    • Hypothyroidism 2020   • Intrinsic asthma 2020   • Knee dislocation    • Labral tear of right hip joint    • Laryngitis sicca    • Laryngitis, chronic    • Left carotid bruit 3/9/2016   • MI (myocardial infarction) ([facility])    • Myalgia due to statin 2019   • Open wound of right hip 2021   • Osteomyelitis of right  "femur ([facility]) 7/6/2021   • Otorrhea    • Pacemaker 11/17/2016   • Primary osteoarthritis of left knee 12/23/2020   • Psoriasis vulgaris 12/23/2020   • S/P coronary artery stent placement 3/9/2016   • Sensorineural hearing loss    • Seropositive rheumatoid arthritis of multiple sites ([facility]) 12/27/2019    Overview:  -myochrysine '93-'96 -methotrexate '96--->11/98;r/s  restarted 2/99--> 8/14 (anemia) -sulfasalazine- not effective -penicillamine 6/98-->10/98; no effect -leflunomide 11/98--> - Humira '13-->didn't take - Enbrel 12/14-->3/15- no effect!   Last Assessment & Plan:  - \"aching all over\" because she had to be off her anti-rheumatic drugs for 2 weeks in preparation for her R knee surgery - he   • Sick sinus syndrome ([facility]) 12/27/2019   • Sjogren's disease ([facility])    • Spondylolisthesis of lumbar region 1/17/2018   • Syncope, recurrent 2/8/2021     Past Surgical History:   Procedure Laterality Date   • A-V CARDIAC PACEMAKER INSERTION  2016   • ATRIAL CARDIAC PACEMAKER INSERTION     • CARDIAC CATHETERIZATION     • CATARACT EXTRACTION     • CERVICAL CORPECTOMY N/A 3/3/2021    Procedure: CERVICAL 6 CORPECTOMY WITH TITANIUM CAGE WITH NEURO MONITORING;  Surgeon: Bandar Shea MD;  Location: [facility];  Service: Neurosurgery;  Laterality: N/A;   • COLONOSCOPY  11/08/2011    One fold in the ascending colon which showed ulcer otherwise normal exam   • COLONOSCOPY  11/12/2004    Normal exam repeat in five years   • CORONARY ANGIOPLASTY WITH STENT PLACEMENT      X 2; 2013 & 2014   • ENDOSCOPY  07/10/2014    Normal exam   • FLAP LEG Right 9/14/2021    Procedure: RIGHT GLUTEAL FASCIOCUTANEOUS ADVANCEMENT FLAP AND RIGHT TENSOR FASCIAL JESSICA FLAP;  Surgeon: Amadeo Turner MD;  Location: [facility] OR;  Service: Plastics;  Laterality: Right;   • HIP ABDUCTION TENOTOMY BILATERAL Right 1/14/2021    Procedure: RIGHT HIP GLUTEUS MEDLUS / MINIMUS REPAIR, POSSIBLE ACHILLES ALLOGRAFT;  Surgeon: Nino Carlson MD;  Location: [facility]" OR;  Service: Orthopedics;  Laterality: Right;   • INCISION AND DRAINAGE HIP Right 2/9/2021    Procedure: HIP INCISION AND DRAINAGE;  Surgeon: Nino Carlson MD;  Location:  [facility] OR;  Service: Orthopedics;  Laterality: Right;   • INCISION AND DRAINAGE LEG Right 10/24/2021    Procedure: INCISION AND DRAINAGE LOWER EXTREMITY;  Surgeon: Amadeo Turner MD;  Location:  [facility] OR;  Service: Plastics;  Laterality: Right;   • INCISION AND DRAINAGE OF WOUND Right 7/8/2021    Procedure: INCISION AND DRAINAGE WOUND RIGHT HIP;  Surgeon: James Huntley MD;  Location:  [facility] OR;  Service: Orthopedics;  Laterality: Right;   • JOINT REPLACEMENT     • KYPHOPLASTY WITH BIOPSY Bilateral 10/26/2021    Procedure: THOARCIC 12 KYPHOPLASTY WITH BIOPSY;  Surgeon: Bandar Shea MD;  Location:  [facility] OR;  Service: Neurosurgery;  Laterality: Bilateral;   • LEG DEBRIDEMENT Right 9/14/2021    Procedure: DEBRIDEMENT OF RIGHT HIP WOUND, RIGHT GLUTEAL FASCIOCUTANEOUS ADVANCEMENT FLAP AND RIGHT TENSOR FASCIAL JESSICA FLAP;  Surgeon: Amadeo Turner MD;  Location:  [facility] OR;  Service: Plastics;  Laterality: Right;   • LUMBAR DISCECTOMY Right 3/23/2021    Procedure: LUMBAR DISCECTOMY MICRO, Lumbar 1/2 right;  Surgeon: Bandar Shea MD;  Location: [facility] OR;  Service: Neurosurgery;  Laterality: Right;   • LUMBAR FUSION N/A 1/19/2018    Procedure: L3-4,L4-5 DECOMPRESSION, POSTERIOR SPINAL FUSION WITH INSTRUMENTATION;  Surgeon: Fortino Oropeza MD;  Location: [facility] OR;  Service:    • LUMBAR LAMINECTOMY WITH FUSION Left 1/17/2018    Procedure: LEFT L3-4 L4-5 LATERAL LUMBAR INTERBODY FUSION;  Surgeon: Fortino Oropeza MD;  Location: [facility] OR;  Service:    • MYRINGOTOMY W/ TUBES  09/04/2014    TUBES NO LONGER IN PLACE   • OTHER SURGICAL HISTORY      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 so hardware was removed and spacers were placed   • REPLACEMENT TOTAL KNEE Right          OT ASSESSMENT FLOWSHEET (last 12 hours)     OT  Evaluation and Treatment     Row Name 11/10/21 0925                   OT Time and Intention    Subjective Information complains of; pain  -TS        Document Type therapy note (daily note)  -TS        Mode of Treatment occupational therapy  -TS        Patient Effort fair  -TS        Comment TLSO  -TS                  General Information    Existing Precautions/Restrictions fall; spinal; brace worn when out of bed  -TS                  Cognition    Personal Safety Interventions nonskid shoes/slippers when out of bed; fall prevention program maintained  -TS                  Pain Scale: Numbers Pre/Post-Treatment    Pretreatment Pain Rating 5/10  -TS        Posttreatment Pain Rating 5/10  -TS        Pain Location - Side Bilateral  -TS        Pain Location - Orientation lower  -TS        Pain Location back  -TS        Pre/Posttreatment Pain Comment pain decreased with 4-5 heel slides per pt request.  -TS        Pain Intervention(s) Repositioned; Ambulation/increased activity  -TS                  Bed Mobility    Rolling Left Ada (Bed Mobility) minimum assist (75% patient effort)  -TS        Rolling Right Ada (Bed Mobility) moderate assist (50% patient effort)  -TS        Comment (Bed Mobility) able to assist with rolling to L, unable to hold position without assist  -TS                  Activities of Daily Living    BADL Assessment/Intervention grooming; feeding  -TS                  Grooming Assessment/Training    Ada Level (Grooming) hair care, combing/brushing; minimum assist (75% patient effort); wash face, hands; set up  -TS        Position (Grooming) supine  -TS                  Self-Feeding Assessment/Training    Ada Level (Feeding) liquids to mouth; set up  -TS        Position (Self-Feeding) supine  -TS        Comment (Feeding) Pt had HOB elevated to 20-30 degrees as she was able to tolerate for self feeding. Pt then had to take a break from self feeding due to increased pain  "in lower back. Pt then requested for CONTI to assist with \"moving my legs\" in an effort to decrease the pressure on patient lower back  -TS                  Wound 10/24/21 0819 Right hip Incision    Wound - Properties Group Placement Date: 10/24/21  -DT Placement Time: 0819  -DT Present on Hospital Admission: N  -DT Side: Right  -DT Location: hip  -DT Primary Wound Type: Incision  -DT        Retired Wound - Properties Group Date first assessed: 10/24/21  -DT Time first assessed: 0819  -DT Present on Hospital Admission: N  -DT Side: Right  -DT Location: hip  -DT Primary Wound Type: Incision  -DT                  Wound 11/07/21 0416 Bilateral coccyx    Wound - Properties Group Placement Date: 11/07/21  -LF Placement Time: 0416  -LF Present on Hospital Admission: N  -LF Side: Bilateral  -LF Location: coccyx  -LF        Retired Wound - Properties Group Date first assessed: 11/07/21  -LF Time first assessed: 0416  -LF Present on Hospital Admission: N  -LF Side: Bilateral  -LF Location: coccyx  -LF                  Plan of Care Review    Plan of Care Reviewed With patient  -TS        Progress no change  -TS        Outcome Summary Pt c/o lower back pain as CONTI enters room. Pt is elevated to tolerable level for self feeding. Pt is able to tolerate HOB elevated 20-30 degrees with rest breaks. Pt min/mod A for bed mobility. Pt tolerated pillows under thighs to offload pressure at lower back. CONTI also suggested pt lay with HOB flat and elevate lower half of bed to trial for pain relief. Pt did assist with rolling to L. Pt tolerating sidelying position with bottom completely off loaded when CONTI exited room. Continue OT POC  -TS                  Positioning and Restraints    Pre-Treatment Position in bed  -TS        Post Treatment Position bed  -TS        In Bed side lying left; call light within reach; encouraged to call for assist; with family/caregiver; side rails up x3; SCD pump applied; pillow between legs  -TS           "    User Key  (r) = Recorded By, (t) = Taken By, (c) = Cosigned By    Initials Name Effective Dates    TS Carolee Giordano COTA 06/16/21 -     Jean Carlos Aburto RN 06/16/21 -     So Robison RN 07/28/20 -                  Occupational Therapy Education                 Title: PT OT SLP Therapies (In Progress)     Topic: Occupational Therapy (In Progress)     Point: ADL training (Done)     Description:   Instruct learner(s) on proper safety adaptation and remediation techniques during self care or transfers.   Instruct in proper use of assistive devices.              Learning Progress Summary           Patient Acceptance, E, VU by PARMINDER at 11/8/2021 1447    Acceptance, E,D, NR by MARCI at 11/5/2021 1204    Acceptance, E,D, NR by DESHAWN at 11/4/2021 1545   Family Acceptance, E, VU by PARMINDER at 11/8/2021 1447                   Point: Home exercise program (In Progress)     Description:   Instruct learner(s) on appropriate technique for monitoring, assisting and/or progressing therapeutic exercises/activities.              Learning Progress Summary           Patient Acceptance, E,D, NR by DESHAWN at 11/4/2021 1545                   Point: Precautions (Done)     Description:   Instruct learner(s) on prescribed precautions during self-care and functional transfers.              Learning Progress Summary           Patient Acceptance, E, VU by PARMINDER at 11/8/2021 1447    Acceptance, E,D, NR by DESHAWN at 11/4/2021 1545   Family Acceptance, E, VU by PARMINDER at 11/8/2021 1447                   Point: Body mechanics (In Progress)     Description:   Instruct learner(s) on proper positioning and spine alignment during self-care, functional mobility activities and/or exercises.              Learning Progress Summary           Patient Acceptance, E,D, NR by MARCI at 11/5/2021 1204    Acceptance, E,D, NR by DESHAWN at 11/4/2021 1545                               User Key     Initials Effective Dates Name Provider Type Discipline    DESHAWN 08/28/18 -  Kerry  Adeola LABOY, OTR/L Occupational Therapist OT    EJ 08/04/21 -  Ashley Wagner, OT Student OT Student OT    PARMINDER 08/04/21 -  Ruel Rios, OT Student OT Student OT                  OT Recommendation and Plan     Plan of Care Review  Plan of Care Reviewed With: patient  Progress: no change  Outcome Summary: Pt c/o lower back pain as CONTI enters room. Pt is elevated to tolerable level for self feeding. Pt is able to tolerate HOB elevated 20-30 degrees with rest breaks. Pt min/mod A for bed mobility. Pt tolerated pillows under thighs to offload pressure at lower back. CONTI also suggested pt lay with HOB flat and elevate lower half of bed to trial for pain relief. Pt did assist with rolling to L. Pt tolerating sidelying position with bottom completely off loaded when CONTI exited room. Continue OT POC  Plan of Care Reviewed With: patient  Outcome Summary: Pt c/o lower back pain as CONTI enters room. Pt is elevated to tolerable level for self feeding. Pt is able to tolerate HOB elevated 20-30 degrees with rest breaks. Pt min/mod A for bed mobility. Pt tolerated pillows under thighs to offload pressure at lower back. CONTI also suggested pt lay with HOB flat and elevate lower half of bed to trial for pain relief. Pt did assist with rolling to L. Pt tolerating sidelying position with bottom completely off loaded when CONTI exited room. Continue OT POC     Outcome Measures     Row Name 11/10/21 1400             How much help from another is currently needed...    Putting on and taking off regular lower body clothing? 1  -TS      Bathing (including washing, rinsing, and drying) 2  -TS      Toileting (which includes using toilet bed pan or urinal) 2  -TS      Putting on and taking off regular upper body clothing 2  -TS      Taking care of personal grooming (such as brushing teeth) 3  -TS      Eating meals 4  -TS      AM-PAC 6 Clicks Score (OT) 14  -TS            User Key  (r) = Recorded By, (t) = Taken By, (c) = Cosigned By     Initials Name Provider Type    TS Carolee Giordano COTA Occupational Therapy Assistant                Time Calculation:    Time Calculation- OT     Row Name 11/10/21 1449             Time Calculation- OT    OT Start Time 0925  -TS      OT Stop Time 1010  -TS      OT Time Calculation (min) 45 min  -TS      Total Timed Code Minutes- OT 45 minute(s)  -TS      OT Received On 11/10/21  -TS              Timed Charges    41582 - OT Self Care/Mgmt Minutes 45  -TS              Total Minutes    Timed Charges Total Minutes 45  -TS       Total Minutes 45  -TS            User Key  (r) = Recorded By, (t) = Taken By, (c) = Cosigned By    Initials Name Provider Type    TS Carolee Giordano COTA Occupational Therapy Assistant              Therapy Charges for Today     Code Description Service Date Service Provider Modifiers Qty    [id number] HC OT SELF CARE/MGMT/TRAIN EA 15 MIN 11/10/2021 Carolee Giordano COTA GO 3               Carolee NIELSON. GALLITO Giordano  11/10/2021

## 2021-11-11 LAB
ALBUMIN SERPL-MCNC: 2.8 G/DL (ref 3.5–5.2)
ALBUMIN/GLOB SERPL: 0.7 G/DL
ALP SERPL-CCNC: 210 U/L (ref 39–117)
ALT SERPL W P-5'-P-CCNC: <5 U/L (ref 1–33)
ANION GAP SERPL CALCULATED.3IONS-SCNC: 9 MMOL/L (ref 5–15)
AST SERPL-CCNC: 20 U/L (ref 1–32)
BASOPHILS # BLD AUTO: 0.04 10*3/MM3 (ref 0–0.2)
BASOPHILS NFR BLD AUTO: 0.9 % (ref 0–1.5)
BILIRUB SERPL-MCNC: 0.4 MG/DL (ref 0–1.2)
BUN SERPL-MCNC: 17 MG/DL (ref 8–23)
BUN/CREAT SERPL: 28.3 (ref 7–25)
CALCIUM SPEC-SCNC: 9.3 MG/DL (ref 8.6–10.5)
CHLORIDE SERPL-SCNC: 99 MMOL/L (ref 98–107)
CO2 SERPL-SCNC: 27 MMOL/L (ref 22–29)
CREAT SERPL-MCNC: 0.6 MG/DL (ref 0.57–1)
CRP SERPL-MCNC: 5.15 MG/DL (ref 0–0.5)
DEPRECATED RDW RBC AUTO: 57.4 FL (ref 37–54)
EOSINOPHIL # BLD AUTO: 0.15 10*3/MM3 (ref 0–0.4)
EOSINOPHIL NFR BLD AUTO: 3.5 % (ref 0.3–6.2)
ERYTHROCYTE [DISTWIDTH] IN BLOOD BY AUTOMATED COUNT: 18 % (ref 12.3–15.4)
GFR SERPL CREATININE-BSD FRML MDRD: 99 ML/MIN/1.73
GLOBULIN UR ELPH-MCNC: 4 GM/DL
GLUCOSE SERPL-MCNC: 82 MG/DL (ref 65–99)
HCT VFR BLD AUTO: 25.6 % (ref 34–46.6)
HGB BLD-MCNC: 8 G/DL (ref 12–15.9)
IMM GRANULOCYTES # BLD AUTO: 0.03 10*3/MM3 (ref 0–0.05)
IMM GRANULOCYTES NFR BLD AUTO: 0.7 % (ref 0–0.5)
LYMPHOCYTES # BLD AUTO: 1.25 10*3/MM3 (ref 0.7–3.1)
LYMPHOCYTES NFR BLD AUTO: 29.3 % (ref 19.6–45.3)
MCH RBC QN AUTO: 27.7 PG (ref 26.6–33)
MCHC RBC AUTO-ENTMCNC: 31.3 G/DL (ref 31.5–35.7)
MCV RBC AUTO: 88.6 FL (ref 79–97)
MONOCYTES # BLD AUTO: 0.55 10*3/MM3 (ref 0.1–0.9)
MONOCYTES NFR BLD AUTO: 12.9 % (ref 5–12)
NEUTROPHILS NFR BLD AUTO: 2.25 10*3/MM3 (ref 1.7–7)
NEUTROPHILS NFR BLD AUTO: 52.7 % (ref 42.7–76)
NRBC BLD AUTO-RTO: 0 /100 WBC (ref 0–0.2)
PLATELET # BLD AUTO: 205 10*3/MM3 (ref 140–450)
PMV BLD AUTO: 9.2 FL (ref 6–12)
POTASSIUM SERPL-SCNC: 5.2 MMOL/L (ref 3.5–5.2)
PROT SERPL-MCNC: 6.8 G/DL (ref 6–8.5)
RBC # BLD AUTO: 2.89 10*6/MM3 (ref 3.77–5.28)
SODIUM SERPL-SCNC: 135 MMOL/L (ref 136–145)
WBC # BLD AUTO: 4.27 10*3/MM3 (ref 3.4–10.8)

## 2021-11-11 PROCEDURE — 80053 COMPREHEN METABOLIC PANEL: CPT | Performed by: NURSE PRACTITIONER

## 2021-11-11 PROCEDURE — 97535 SELF CARE MNGMENT TRAINING: CPT

## 2021-11-11 PROCEDURE — 97530 THERAPEUTIC ACTIVITIES: CPT | Performed by: PHYSICAL THERAPIST

## 2021-11-11 PROCEDURE — 85025 COMPLETE CBC W/AUTO DIFF WBC: CPT | Performed by: NURSE PRACTITIONER

## 2021-11-11 PROCEDURE — P9612 CATHETERIZE FOR URINE SPEC: HCPCS

## 2021-11-11 PROCEDURE — 25010000002 HEPARIN (PORCINE) PER 1000 UNITS: Performed by: NEUROLOGICAL SURGERY

## 2021-11-11 PROCEDURE — 86140 C-REACTIVE PROTEIN: CPT | Performed by: NURSE PRACTITIONER

## 2021-11-11 PROCEDURE — 97110 THERAPEUTIC EXERCISES: CPT | Performed by: PHYSICAL THERAPIST

## 2021-11-11 PROCEDURE — 99024 POSTOP FOLLOW-UP VISIT: CPT | Performed by: NURSE PRACTITIONER

## 2021-11-11 PROCEDURE — 25010000002 CEFAZOLIN PER 500 MG: Performed by: INTERNAL MEDICINE

## 2021-11-11 RX ORDER — FLUCONAZOLE 150 MG/1
150 TABLET ORAL
Status: DISCONTINUED | OUTPATIENT
Start: 2021-11-11 | End: 2021-11-12 | Stop reason: HOSPADM

## 2021-11-11 RX ADMIN — LEVOTHYROXINE SODIUM 75 MCG: 75 TABLET ORAL at 06:25

## 2021-11-11 RX ADMIN — SUCRALFATE 1 G: 1 TABLET ORAL at 17:03

## 2021-11-11 RX ADMIN — SODIUM CHLORIDE, PRESERVATIVE FREE 10 ML: 5 INJECTION INTRAVENOUS at 20:22

## 2021-11-11 RX ADMIN — CEFAZOLIN 2 G: 10 INJECTION, POWDER, FOR SOLUTION INTRAVENOUS at 11:11

## 2021-11-11 RX ADMIN — FUROSEMIDE 40 MG: 40 TABLET ORAL at 08:41

## 2021-11-11 RX ADMIN — TRAMADOL HYDROCHLORIDE 100 MG: 50 TABLET ORAL at 08:41

## 2021-11-11 RX ADMIN — ISOSORBIDE MONONITRATE 60 MG: 60 TABLET, EXTENDED RELEASE ORAL at 20:21

## 2021-11-11 RX ADMIN — FERROUS SULFATE TAB 325 MG (65 MG ELEMENTAL FE) 325 MG: 325 (65 FE) TAB at 11:08

## 2021-11-11 RX ADMIN — SUCRALFATE 1 G: 1 TABLET ORAL at 20:21

## 2021-11-11 RX ADMIN — DULOXETINE HYDROCHLORIDE 30 MG: 30 CAPSULE, DELAYED RELEASE ORAL at 11:09

## 2021-11-11 RX ADMIN — ACETAMINOPHEN 650 MG: 325 TABLET, FILM COATED ORAL at 11:08

## 2021-11-11 RX ADMIN — FOLIC ACID 1 MG: 1 TABLET ORAL at 11:12

## 2021-11-11 RX ADMIN — HEPARIN SODIUM 5000 UNITS: 5000 INJECTION INTRAVENOUS; SUBCUTANEOUS at 20:21

## 2021-11-11 RX ADMIN — MAGNESIUM GLUCONATE 500 MG ORAL TABLET 400 MG: 500 TABLET ORAL at 11:08

## 2021-11-11 RX ADMIN — CEFAZOLIN 2 G: 10 INJECTION, POWDER, FOR SOLUTION INTRAVENOUS at 00:16

## 2021-11-11 RX ADMIN — Medication 250 MG: at 11:08

## 2021-11-11 RX ADMIN — FLUCONAZOLE 150 MG: 150 TABLET ORAL at 15:19

## 2021-11-11 RX ADMIN — PANTOPRAZOLE SODIUM 40 MG: 40 TABLET, DELAYED RELEASE ORAL at 08:41

## 2021-11-11 RX ADMIN — SUCRALFATE 1 G: 1 TABLET ORAL at 11:13

## 2021-11-11 RX ADMIN — SODIUM CHLORIDE, PRESERVATIVE FREE 10 ML: 5 INJECTION INTRAVENOUS at 11:13

## 2021-11-11 RX ADMIN — HYDROCODONE BITARTRATE AND ACETAMINOPHEN 1 TABLET: 10; 325 TABLET ORAL at 04:14

## 2021-11-11 RX ADMIN — CARVEDILOL 25 MG: 25 TABLET, FILM COATED ORAL at 17:03

## 2021-11-11 RX ADMIN — DOCUSATE SODIUM 100 MG: 100 CAPSULE, LIQUID FILLED ORAL at 20:21

## 2021-11-11 RX ADMIN — FLUTICASONE PROPIONATE 1 SPRAY: 50 SPRAY, METERED NASAL at 08:51

## 2021-11-11 RX ADMIN — DOCUSATE SODIUM 50 MG AND SENNOSIDES 8.6 MG 2 TABLET: 8.6; 5 TABLET, FILM COATED ORAL at 20:21

## 2021-11-11 RX ADMIN — THIAMINE HCL TAB 100 MG 250 MG: 100 TAB at 08:44

## 2021-11-11 RX ADMIN — POTASSIUM CHLORIDE 20 MEQ: 10 CAPSULE, COATED, EXTENDED RELEASE ORAL at 11:08

## 2021-11-11 RX ADMIN — ISOSORBIDE MONONITRATE 60 MG: 60 TABLET, EXTENDED RELEASE ORAL at 11:08

## 2021-11-11 RX ADMIN — TRAMADOL HYDROCHLORIDE 100 MG: 50 TABLET ORAL at 17:03

## 2021-11-11 RX ADMIN — TRAMADOL HYDROCHLORIDE 100 MG: 50 TABLET ORAL at 20:21

## 2021-11-11 RX ADMIN — POTASSIUM CHLORIDE 20 MEQ: 10 CAPSULE, COATED, EXTENDED RELEASE ORAL at 20:21

## 2021-11-11 RX ADMIN — FLUTICASONE PROPIONATE 1 SPRAY: 50 SPRAY, METERED NASAL at 20:22

## 2021-11-11 RX ADMIN — HEPARIN SODIUM 5000 UNITS: 5000 INJECTION INTRAVENOUS; SUBCUTANEOUS at 08:46

## 2021-11-11 RX ADMIN — CEFAZOLIN 2 G: 10 INJECTION, POWDER, FOR SOLUTION INTRAVENOUS at 17:03

## 2021-11-11 RX ADMIN — VALACYCLOVIR HYDROCHLORIDE 500 MG: 500 TABLET, FILM COATED ORAL at 11:09

## 2021-11-11 RX ADMIN — CARVEDILOL 25 MG: 25 TABLET, FILM COATED ORAL at 08:41

## 2021-11-11 RX ADMIN — SUCRALFATE 1 G: 1 TABLET ORAL at 08:41

## 2021-11-11 NOTE — THERAPY TREATMENT NOTE
Acute Care - Occupational Therapy Treatment Note  [facility]     Patient Name: Tawny Shin  : 1955  MRN: [id number]  Today's Date: 2021             Admit Date: 2021       ICD-10-CM ICD-9-CM   1. Impaired mobility  Z74.09 799.89   2. Impaired mobility and ADLs  Z74.09 V49.89    Z78.9    3. Dysphagia, unspecified type  R13.10 787.20     Patient Active Problem List   Diagnosis   • T12 compression fracture, initial encounter ([facility])   • Chronic embolism and thrombosis of unspecified deep veins of left lower extremity ([facility])   • Urinary tract infection without hematuria   • Acute bilateral thoracic back pain   • Chronic anticoagulation   • Hypokalemia   • Transaminitis   • Iron deficiency anemia   • Osteoporosis   • Bacteremia   • Moderate malnutrition (CMS/HCC)   • Epidural hematoma ([facility])   • Severe malnutrition ([facility])     Past Medical History:   Diagnosis Date   • Age-related osteoporosis with current pathological fracture 2020   • Arthritis    • Asthma    • Bilateral bunions 2020   • Cancer ([facility])    • Cardiac pacemaker syndrome 2020    Overview:  - heart block - implanted    • Charcot's joint of foot, left 2020   • Chronic deep vein thrombosis (DVT) of right lower extremity ([facility]) 2021   • Chronic pain syndrome 2021   • Chronic sinusitis    • COPD (chronic obstructive pulmonary disease) (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)    • Myalgia due to statin 2019   • Open wound of right hip 2021   • Osteomyelitis of right  "femur ([facility]) 7/6/2021   • Otorrhea    • Pacemaker 11/17/2016   • Primary osteoarthritis of left knee 12/23/2020   • Psoriasis vulgaris 12/23/2020   • S/P coronary artery stent placement 3/9/2016   • Sensorineural hearing loss    • Seropositive rheumatoid arthritis of multiple sites ([facility]) 12/27/2019    Overview:  -myochrysine '93-'96 -methotrexate '96--->11/98;r/s  restarted 2/99--> 8/14 (anemia) -sulfasalazine- not effective -penicillamine 6/98-->10/98; no effect -leflunomide 11/98--> - Humira '13-->didn't take - Enbrel 12/14-->3/15- no effect!   Last Assessment & Plan:  - \"aching all over\" because she had to be off her anti-rheumatic drugs for 2 weeks in preparation for her R knee surgery - he   • Sick sinus syndrome ([facility]) 12/27/2019   • Sjogren's disease ([facility])    • Spondylolisthesis of lumbar region 1/17/2018   • Syncope, recurrent 2/8/2021     Past Surgical History:   Procedure Laterality Date   • A-V CARDIAC PACEMAKER INSERTION  2016   • ATRIAL CARDIAC PACEMAKER INSERTION     • CARDIAC CATHETERIZATION     • CATARACT EXTRACTION     • CERVICAL CORPECTOMY N/A 3/3/2021    Procedure: CERVICAL 6 CORPECTOMY WITH TITANIUM CAGE WITH NEURO MONITORING;  Surgeon: Bandar Shea MD;  Location: [facility];  Service: Neurosurgery;  Laterality: N/A;   • COLONOSCOPY  11/08/2011    One fold in the ascending colon which showed ulcer otherwise normal exam   • COLONOSCOPY  11/12/2004    Normal exam repeat in five years   • CORONARY ANGIOPLASTY WITH STENT PLACEMENT      X 2; 2013 & 2014   • ENDOSCOPY  07/10/2014    Normal exam   • FLAP LEG Right 9/14/2021    Procedure: RIGHT GLUTEAL FASCIOCUTANEOUS ADVANCEMENT FLAP AND RIGHT TENSOR FASCIAL JESSICA FLAP;  Surgeon: Amadeo Turner MD;  Location: [facility] OR;  Service: Plastics;  Laterality: Right;   • HIP ABDUCTION TENOTOMY BILATERAL Right 1/14/2021    Procedure: RIGHT HIP GLUTEUS MEDLUS / MINIMUS REPAIR, POSSIBLE ACHILLES ALLOGRAFT;  Surgeon: Nino Carlson MD;  Location: [facility]" OR;  Service: Orthopedics;  Laterality: Right;   • INCISION AND DRAINAGE HIP Right 2/9/2021    Procedure: HIP INCISION AND DRAINAGE;  Surgeon: Nino Carlson MD;  Location:  [facility] OR;  Service: Orthopedics;  Laterality: Right;   • INCISION AND DRAINAGE LEG Right 10/24/2021    Procedure: INCISION AND DRAINAGE LOWER EXTREMITY;  Surgeon: Amadeo Turner MD;  Location:  [facility] OR;  Service: Plastics;  Laterality: Right;   • INCISION AND DRAINAGE OF WOUND Right 7/8/2021    Procedure: INCISION AND DRAINAGE WOUND RIGHT HIP;  Surgeon: James Hutnley MD;  Location:  [facility] OR;  Service: Orthopedics;  Laterality: Right;   • JOINT REPLACEMENT     • KYPHOPLASTY WITH BIOPSY Bilateral 10/26/2021    Procedure: THOARCIC 12 KYPHOPLASTY WITH BIOPSY;  Surgeon: Bandar Shea MD;  Location:  [facility] OR;  Service: Neurosurgery;  Laterality: Bilateral;   • LEG DEBRIDEMENT Right 9/14/2021    Procedure: DEBRIDEMENT OF RIGHT HIP WOUND, RIGHT GLUTEAL FASCIOCUTANEOUS ADVANCEMENT FLAP AND RIGHT TENSOR FASCIAL JESSICA FLAP;  Surgeon: Amadeo Turner MD;  Location:  [facility] OR;  Service: Plastics;  Laterality: Right;   • LUMBAR DISCECTOMY Right 3/23/2021    Procedure: LUMBAR DISCECTOMY MICRO, Lumbar 1/2 right;  Surgeon: Bandar Shea MD;  Location: [facility] OR;  Service: Neurosurgery;  Laterality: Right;   • LUMBAR FUSION N/A 1/19/2018    Procedure: L3-4,L4-5 DECOMPRESSION, POSTERIOR SPINAL FUSION WITH INSTRUMENTATION;  Surgeon: Fortino Oropeza MD;  Location: [facility] OR;  Service:    • LUMBAR LAMINECTOMY WITH FUSION Left 1/17/2018    Procedure: LEFT L3-4 L4-5 LATERAL LUMBAR INTERBODY FUSION;  Surgeon: Fortino Oropeza MD;  Location: [facility] OR;  Service:    • MYRINGOTOMY W/ TUBES  09/04/2014    TUBES NO LONGER IN PLACE   • OTHER SURGICAL HISTORY      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 so hardware was removed and spacers were placed   • REPLACEMENT TOTAL KNEE Right          OT ASSESSMENT FLOWSHEET (last 12 hours)     OT  Evaluation and Treatment     Row Name 11/11/21 0845                   OT Time and Intention    Subjective Information complains of; pain  -TS        Document Type therapy note (daily note)  -TS        Mode of Treatment occupational therapy  -TS        Patient Effort fair  -TS        Comment TLSO OOB  -TS                  General Information    Existing Precautions/Restrictions fall; spinal; brace worn when out of bed  -TS                  Cognition    Personal Safety Interventions fall prevention program maintained; gait belt; nonskid shoes/slippers when out of bed  -TS                  Pain Scale: Numbers Pre/Post-Treatment    Pretreatment Pain Rating 7/10  -TS        Posttreatment Pain Rating 7/10  -TS        Pain Location - Side Bilateral  -TS        Pain Location - Orientation lower  -TS        Pain Location back  -TS        Pain Intervention(s) Repositioned; Medication (See MAR)  -TS                  Bed Mobility    Rolling Left [place] (Bed Mobility) minimum assist (75% patient effort)  -TS        Rolling Right [place] (Bed Mobility) moderate assist (50% patient effort)  -TS        Scooting/Bridging [place] (Bed Mobility) maximum assist (25% patient effort); 2 person assist  -TS        Sit-Sidelying [place] (Bed Mobility) moderate assist (50% patient effort); maximum assist (25% patient effort)  -TS                  Transfer Assessment/Treatment    Transfers stand pivot/stand step transfer  -TS                  Stand Pivot/Stand Step Transfer    Stand Pivot/Stand Step [place] (Transfers) maximum assist (25% patient effort)  -TS                  Activities of Daily Living    BADL Assessment/Intervention bathing; upper body dressing; toileting  -TS                  Bathing Assessment/Intervention    [place] Level (Bathing) upper body; set up; standby assist; distal lower extremities/feet; maximum assist (25% patient effort); perineal area; moderate assist (50% patient effort)  -TS         Assistive Devices (Bathing) hand-held shower spray hose; shower commode chair, rolling; grab bar, tub/shower  -TS        Position (Bathing) supported sitting  -TS        Comment (Bathing) waffle cushion placed on seat of rolling shower chair  -TS                  Upper Body Dressing Assessment/Training    [place] Level (Upper Body Dressing) don; doff; contact guard assist  -TS        Position (Upper Body Dressing) supine  -TS                  Grooming Assessment/Training    [place] Level (Grooming) wash face, hands; set up  -TS        Position (Grooming) supported sitting  -TS                  Toileting Assessment/Training    [place] Level (Toileting) perform perineal hygiene; maximum assist (25% patient effort)  -TS        Position (Toileting) supine  -TS                  Wound 10/24/21 0819 Right hip Incision    Wound - Properties Group Placement Date: 10/24/21  -DT Placement Time: 0819  -DT Present on Hospital Admission: N  -DT Side: Right  -DT Location: hip  -DT Primary Wound Type: Incision  -DT        Retired Wound - Properties Group Date first assessed: 10/24/21  -DT Time first assessed: 0819  -DT Present on Hospital Admission: N  -DT Side: Right  -DT Location: hip  -DT Primary Wound Type: Incision  -DT                  Wound 11/07/21 0416 Bilateral coccyx    Wound - Properties Group Placement Date: 11/07/21  -LF Placement Time: 0416  -LF Present on Hospital Admission: N  -LF Side: Bilateral  -LF Location: coccyx  -LF        Retired Wound - Properties Group Date first assessed: 11/07/21  -LF Time first assessed: 0416  -LF Present on Hospital Admission: N  -LF Side: Bilateral  -LF Location: coccyx  -LF                  Plan of Care Review    Plan of Care Reviewed With patient  -TS        Progress improving  -TS        Outcome Summary Pt tolerated sitting in rolling shower chair for TB bath this AM. Waffle cushion placed on rolling shower chair for increasaed comfort. Pt participated in UB  bathing while supported in shower chair. Pt max A for pivot t/f to EOB and max A for sit to supine. Pt min/mod for rolling for hygiene in bed. CONTI did speak with pt about importance of continuing to work to build tolerance of sitting upright along with strengthening BUE to assist with weight shifting. Pt also educated on importance of turning schedule while in bed to assist with decreased skin breakdown. Pt may benefit from LTACH at discharge for continued focused therapy. Continue OT POC  -TS                  Positioning and Restraints    Pre-Treatment Position in bed  -TS        Post Treatment Position bed  -TS        In Bed fowlers; call light within reach; encouraged to call for assist; with family/caregiver; side rails up x2; waffle boots/both  -TS              User Key  (r) = Recorded By, (t) = Taken By, (c) = Cosigned By    Initials Name Effective Dates    TS Carolee Giordano COTA 06/16/21 -     Jean Carlos Aburto RN 06/16/21 -     So Robison RN 07/28/20 -                  Occupational Therapy Education                 Title: PT OT SLP Therapies (In Progress)     Topic: Occupational Therapy (In Progress)     Point: ADL training (Done)     Description:   Instruct learner(s) on proper safety adaptation and remediation techniques during self care or transfers.   Instruct in proper use of assistive devices.              Learning Progress Summary           Patient Acceptance, E, VU by PARMINDER at 11/8/2021 1447    Acceptance, E,D, NR by MARCI at 11/5/2021 1204    Acceptance, E,D, NR by DESHAWN at 11/4/2021 1545   Family Acceptance, E, VU by PARMINDER at 11/8/2021 1447                   Point: Home exercise program (In Progress)     Description:   Instruct learner(s) on appropriate technique for monitoring, assisting and/or progressing therapeutic exercises/activities.              Learning Progress Summary           Patient Acceptance, E,D, NR by DESHAWN at 11/4/2021 1545                   Point: Precautions (Done)      Description:   Instruct learner(s) on prescribed precautions during self-care and functional transfers.              Learning Progress Summary           Patient Acceptance, E, VU by PARMINDER at 11/8/2021 1447    Acceptance, E,D, NR by DESHAWN at 11/4/2021 1545   Family Acceptance, E, VU by PARMINDER at 11/8/2021 1447                   Point: Body mechanics (In Progress)     Description:   Instruct learner(s) on proper positioning and spine alignment during self-care, functional mobility activities and/or exercises.              Learning Progress Summary           Patient Acceptance, E,D, NR by MARCI at 11/5/2021 1204    Acceptance, E,D, NR by DESHAWN at 11/4/2021 1545                               User Key     Initials Effective Dates Name Provider Type Discipline    DESHAWN 08/28/18 -  Adeola Payne, OTR/L Occupational Therapist OT    MARCI 08/04/21 -  Ashley Wagner, OT Student OT Student OT    PARMINDER 08/04/21 -  Ruel Rios, OT Student OT Student OT                  OT Recommendation and Plan     Plan of Care Review  Plan of Care Reviewed With: patient  Progress: improving  Outcome Summary: Pt tolerated sitting in rolling shower chair for TB bath this AM. Waffle cushion placed on rolling shower chair for increasaed comfort. Pt participated in UB bathing while supported in shower chair. Pt max A for pivot t/f to EOB and max A for sit to supine. Pt min/mod for rolling for hygiene in bed. CONTI did speak with pt about importance of continuing to work to build tolerance of sitting upright along with strengthening BUE to assist with weight shifting. Pt also educated on importance of turning schedule while in bed to assist with decreased skin breakdown. Pt may benefit from LTACH at discharge for continued focused therapy. Continue OT POC  Plan of Care Reviewed With: patient  Outcome Summary: Pt tolerated sitting in rolling shower chair for TB bath this AM. Waffle cushion placed on rolling shower chair for increasaed comfort. Pt participated in UB  bathing while supported in shower chair. Pt max A for pivot t/f to EOB and max A for sit to supine. Pt min/mod for rolling for hygiene in bed. GALLITO did speak with pt about importance of continuing to work to build tolerance of sitting upright along with strengthening BUE to assist with weight shifting. Pt also educated on importance of turning schedule while in bed to assist with decreased skin breakdown. Pt may benefit from LTACH at discharge for continued focused therapy. Continue OT POC     Outcome Measures     Row Name 11/11/21 1300 11/10/21 1400          How much help from another is currently needed...    Putting on and taking off regular lower body clothing? 2  -TS 1  -TS     Bathing (including washing, rinsing, and drying) 2  -TS 2  -TS     Toileting (which includes using toilet bed pan or urinal) 2  -TS 2  -TS     Putting on and taking off regular upper body clothing 3  -TS 2  -TS     Taking care of personal grooming (such as brushing teeth) 3  -TS 3  -TS     Eating meals 3  -TS 4  -TS     AM-PAC 6 Clicks Score (OT) 15  -TS 14  -TS           User Key  (r) = Recorded By, (t) = Taken By, (c) = Cosigned By    Initials Name Provider Type    Carolee Roger COTA Occupational Therapy Assistant                Time Calculation:    Time Calculation- OT     Row Name 11/11/21 1316             Time Calculation- OT    OT Start Time 0845  -TS      OT Stop Time 1030  -TS      OT Time Calculation (min) 105 min  -TS      Total Timed Code Minutes-  minute(s)  -TS      OT Received On 11/11/21  -TS              Timed Charges    63649 - OT Self Care/Mgmt Minutes 105  -TS              Total Minutes    Timed Charges Total Minutes 105  -TS       Total Minutes 105  -TS            User Key  (r) = Recorded By, (t) = Taken By, (c) = Cosigned By    Initials Name Provider Type    Carolee Roger COTA Occupational Therapy Assistant              Therapy Charges for Today     Code Description Service Date Service  Provider Modifiers Qty    78256574705 HC OT SELF CARE/MGMT/TRAIN EA 15 MIN 11/10/2021 Carolee Giordano, GALLITO GO 3    61652232718 HC OT SELF CARE/MGMT/TRAIN EA 15 MIN 11/11/2021 Carolee Giordano, GALLITO GO 7               Carolee NIELSON. GALLITO Giordano  11/11/2021

## 2021-11-11 NOTE — PLAN OF CARE
Goal Outcome Evaluation:  Plan of Care Reviewed With: patient     Progress: no change  Outcome Summary: Appetite fair. Oral intake 50% of three meals. Soft texture,ground/Cardiac/CCHO. Boost Plus BID. Cont to follow for POC.

## 2021-11-11 NOTE — PLAN OF CARE
Goal Outcome Evaluation:  Plan of Care Reviewed With: patient        Progress: no change  Outcome Summary: Pt alert and oriented this shift.  C/o pain treated with PRN oral pain medication/  Right hip surgical incision dressed with gauze and medipore tape.  Pt is due to void, PT had a purewick on for most of the shift, still unable to void.  Purewick appeared to be irriatating to PT folds and excoriated areas on the inside of her legs.  She was in and out cath this shift with output of 350 mls.  Coccyx is red, nonblanchable and her bottom and leg folds are excoriated.  Barrier cream applied to areas.  Q2 turn.  Soft, ground diet.  Call light in reach, safety maintained.

## 2021-11-11 NOTE — PROGRESS NOTES
NEUROSURGERY DAILY PROGRESS NOTE    ASSESSMENT:   Tawny Shin is a 66 y.o. with a significant medical history of prior L3-5 posterior lumbar fusion (Dr. Oropeza 2018), MSSA osteomyelitis of right femur (7/6/2021), C6 corpectomy (3/3/2021), L1-2 osteomyelitis/discitis requiring L1 microdiscectomy (3/23/2021), and T12 kyphoplasty (10/26/2021), rheumatoid arthritis on disease modifying agents, osteoporosis, Sjogren's disease, MI, CAD with stenting, DVT right lower extremity currently on Xarelto and ASA (as of 11/4/2021), hypertension, COPD, hyperlipidemia, hypothyroidism, and she is overweight.  She presents from rehab with L1-3 numbness and pain and worsening proximal leg weakness over last 4-5 days.  Physical exam findings of bright and awake, oriented x3, chronic bilateral  strength weakness, worsening left and right IP (1/5) and quadricep weakness (3/5), BLE (4-/5), gross hyperreflexia and bilateral Babinski's.  Her imaging shows anterior epidural hematoma associated with her T12 compression fracture.      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   • Elevated cholesterol    • Eustachian tube dysfunction    • Heart disease    • Herpes simplex    • History of transfusion    • Hyperlipidemia    • Hypertension    • Hypothyroidism 12/23/2020   • Intrinsic asthma 12/23/2020   • Knee dislocation    • Labral tear of right hip joint    • Laryngitis sicca    • Laryngitis,  "chronic    • Left carotid bruit 3/9/2016   • MI (myocardial infarction) ([facility])    • Myalgia due to statin 6/25/2019   • Open wound of right hip 9/14/2021   • Osteomyelitis of right femur ([facility]) 7/6/2021   • Otorrhea    • Pacemaker 11/17/2016   • Primary osteoarthritis of left knee 12/23/2020   • Psoriasis vulgaris 12/23/2020   • S/P coronary artery stent placement 3/9/2016   • Sensorineural hearing loss    • Seropositive rheumatoid arthritis of multiple sites ([facility]) 12/27/2019    Overview:  -myochrysine '93-'96 -methotrexate '96--->11/98;r/s  restarted 2/99--> 8/14 (anemia) -sulfasalazine- not effective -penicillamine 6/98-->10/98; no effect -leflunomide 11/98--> - Humira '13-->didn't take - Enbrel 12/14-->3/15- no effect!   Last Assessment & Plan:  - \"aching all over\" because she had to be off her anti-rheumatic drugs for 2 weeks in preparation for her R knee surgery - he   • Sick sinus syndrome ([facility]) 12/27/2019   • Sjogren's disease ([facility])    • Spondylolisthesis of lumbar region 1/17/2018   • Syncope, recurrent 2/8/2021     Active Hospital Problems    Diagnosis    • Severe malnutrition ([facility])    • Epidural hematoma ([facility])      HPI: Appears to be resting comfortably.  Continues to complain of back and bilateral hip and leg pain.  No acute events overnight.    PLAN:   Neuro: Exam unchanged, bilateral lower extremity weakness    T12-L1/2 epidural hematoma from T12 fracure   Continue neurochecks every 4 hours   Plan for DC to rehab    CV: No acute issues.  VS WNL   Xarelto stopped   On ASA 81mg and Heparin at this time.   Pulm: No acute issues.  Maintaining O2 sat.  : I/O Cath PRN.  Bladder scans Q4 hrs.   FEN: Severe Malnutrition.  Prealb 13.  Appreciate nutrition.   GI: No acute issues.  ORTHO: Plastics consulted.  Appreciate assistance  ID: WBC normal.  CRP down to 5.15   Continue Ancef per ID for a total of 4 week; day #19 of 28   Heme:  DVT prophylaxis with SCDs.  Hold Xarelto and begin aspirin 81mg.    Pain: Waxes " "and wanes.  Dispo: PT/OT   DC pending inpatient placement acceptance.    CHIEF COMPLAINT:   L1-3 numbness and pain and worsening proximal leg weakness over last 4-5 days    Subjective  Stable.  Doing well    Temp:  [97.7 °F (36.5 °C)-98.2 °F (36.8 °C)] 97.7 °F (36.5 °C)  Heart Rate:  [64-73] 69  Resp:  [16-20] 16  BP: (124-139)/(51-84) 128/84    Objective:  General Appearance:  In no acute distress.    Vital signs: (most recent): Blood pressure 128/84, pulse 69, temperature 97.7 °F (36.5 °C), temperature source Oral, resp. rate 16, height 167.6 cm (66\"), weight 71.6 kg (157 lb 14.4 oz), SpO2 96 %, not currently breastfeeding.        Neurologic Exam     Mental Status   Oriented to person, place, and time.   Attention: normal. Concentration: normal.   Speech: speech is normal   Level of consciousness: alert    Bright and awake.  Oriented x3.  Follows commands without prompting.     Cranial Nerves     CN II   Visual fields full to confrontation.     CN III, IV, VI   Pupils are equal, round, and reactive to light.  Extraocular motions are normal.     CN V   Facial sensation intact.     CN VII   Facial expression full, symmetric.     CN VIII   CN VIII normal.     CN IX, X   CN IX normal.     CN XI   CN XI normal.     Motor Exam   Right arm tone: normal  Left arm tone: normal  Right leg tone: normal  Left leg tone: normal    Strength   Right deltoid: 5/5  Left deltoid: 5/5  Right biceps: 5/5  Left biceps: 5/5  Right triceps: 4/5  Left triceps: 4/5  Right wrist extension: 5/5  Left wrist extension: 5/5  Right interossei: 4/5  Left interossei: 4/5  Right iliopsoas: 1/5  Left iliopsoas: 2/5  Right quadriceps: 1/5  Left quadriceps: 2/5  Right anterior tibial: 2/5  Left anterior tibial: 4/5  Right gastroc: 2/5  Left gastroc: 4/5       Sensory Exam   Right arm light touch: normal  Left arm light touch: normal  Right leg light touch: normal  Left leg light touch: normal  Sensory deficit distribution on right: L1  Sensory deficit " distribution on left: L1    Gait, Coordination, and Reflexes     Tremor   Resting tremor: absent  Intention tremor: absent  Action tremor: absent    Drains:   Urethral Catheter (Active)   Daily Indications Chronic Urinary Retention related to Obstructive, Infectious/Inflammatory, Neurologic or Traumatic Causes 11/04/21 2111   Site Assessment Clean; Skin intact 11/04/21 2111   Collection Container Standard drainage bag 11/04/21 2111   Securement Method Securing device 11/04/21 2111   Catheter care complete Yes 11/05/21 0600   Output (mL) 450 mL 11/05/21 0600       [REMOVED] Closed/Suction Drain Right;Anterior Thigh Bulb 15 Fr. (Removed)       [REMOVED] Urethral Catheter Silicone 16 Fr. (Removed)       [REMOVED] External Urinary Catheter (Removed)       [REMOVED] External Urinary Catheter (Removed)   Site Assessment Clean 10/24/21 0919   Application/Removal external catheter applied 10/24/21 0919   Collection Container Wall suction 10/24/21 0919   Securement Method Securing device 10/24/21 0919   Output (mL) 350 mL 10/24/21 1841       [REMOVED] External Urinary Catheter (Removed)   Site Assessment Clean; Skin intact 10/27/21 2013   Application/Removal external catheter changed 10/28/21 1025   Collection Container Wall suction 10/28/21 1025   Wall suction (mmHG) 80 mmHG 10/28/21 1025   Securement Method Securing device 10/28/21 1025   Catheter care complete Yes 10/27/21 0815   Output (mL) 400 mL 10/28/21 0300     Imaging Results (Last 24 Hours)     ** No results found for the last 24 hours. **        Lab Results (last 24 hours)     Procedure Component Value Units Date/Time    Comprehensive Metabolic Panel [[id number]]  (Abnormal) Collected: 11/11/21 0603    Specimen: Blood Updated: 11/11/21 0653     Glucose 82 mg/dL      BUN 17 mg/dL      Creatinine 0.60 mg/dL      Sodium 135 mmol/L      Potassium 5.2 mmol/L      Chloride 99 mmol/L      CO2 27.0 mmol/L      Calcium 9.3 mg/dL      Total Protein 6.8 g/dL      Albumin 2.80  g/dL      ALT (SGPT) <5 U/L      AST (SGOT) 20 U/L      Alkaline Phosphatase 210 U/L      Total Bilirubin 0.4 mg/dL      eGFR Non African Amer 99 mL/min/1.73      Globulin 4.0 gm/dL      A/G Ratio 0.7 g/dL      BUN/Creatinine Ratio 28.3     Anion Gap 9.0 mmol/L     Narrative:      GFR Normal >60  Chronic Kidney Disease <60  Kidney Failure <15      C-reactive Protein [[id number]]  (Abnormal) Collected: 11/11/21 0603    Specimen: Blood Updated: 11/11/21 0653     C-Reactive Protein 5.15 mg/dL     CBC & Differential [[id number]]  (Abnormal) Collected: 11/11/21 0603    Specimen: Blood Updated: 11/11/21 0638    Narrative:      The following orders were created for panel order CBC & Differential.  Procedure                               Abnormality         Status                     ---------                               -----------         ------                     CBC Auto Differential[[id number]]        Abnormal            Final result                 Please view results for these tests on the individual orders.    CBC Auto Differential [[id number]]  (Abnormal) Collected: 11/11/21 0603    Specimen: Blood Updated: 11/11/21 0638     WBC 4.27 10*3/mm3      RBC 2.89 10*6/mm3      Hemoglobin 8.0 g/dL      Hematocrit 25.6 %      MCV 88.6 fL      MCH 27.7 pg      MCHC 31.3 g/dL      RDW 18.0 %      RDW-SD 57.4 fl      MPV 9.2 fL      Platelets 205 10*3/mm3      Neutrophil % 52.7 %      Lymphocyte % 29.3 %      Monocyte % 12.9 %      Eosinophil % 3.5 %      Basophil % 0.9 %      Immature Grans % 0.7 %      Neutrophils, Absolute 2.25 10*3/mm3      Lymphocytes, Absolute 1.25 10*3/mm3      Monocytes, Absolute 0.55 10*3/mm3      Eosinophils, Absolute 0.15 10*3/mm3      Basophils, Absolute 0.04 10*3/mm3      Immature Grans, Absolute 0.03 10*3/mm3      nRBC 0.0 /100 WBC         [id number]  Taiwo Prado, APRN

## 2021-11-11 NOTE — PLAN OF CARE
Goal Outcome Evaluation:  Plan of Care Reviewed With: patient           Outcome Summary: PT tx complete: pt AOx4. pt demonstrates improved mobility for rolling L & R as pt was SBA with verbal cuing. pt participated in BLE AAROM exercises. pt is able to complete 2-3 good reps before fatigue sets in and needs encouragement and cuing to keep going. pt was given yellow theraband to use for UE exercises. pt completed 3 repetitions of D2 PNF pattern. pt tolerated all exercises with little to no pain. pt to continue with skilled PT for ROM and strength training. D/C recommendation inpatient rehab vs SNF

## 2021-11-11 NOTE — PLAN OF CARE
Goal Outcome Evaluation:  Plan of Care Reviewed With: patient        Progress: improving  Outcome Summary: Pt tolerated sitting in rolling shower chair for TB bath this AM. Waffle cushion placed on rolling shower chair for increasaed comfort. Pt participated in UB bathing while supported in shower chair. Pt max A for pivot t/f to EOB and max A for sit to supine. Pt min/mod for rolling for hygiene in bed. CONTI did speak with pt about importance of continuing to work to build tolerance of sitting upright along with strengthening BUE to assist with weight shifting. Pt also educated on importance of turning schedule while in bed to assist with decreased skin breakdown. Pt may benefit from LTACH at discharge for continued focused therapy. Continue OT POC

## 2021-11-11 NOTE — PROGRESS NOTES
INFECTIOUS DISEASES PROGRESS NOTE    Patient:  Tawny Shin  YOB: 1955  MRN: [id number]   Admit date: 11/4/2021   Admitting Physician: Bandar Shea, *  Primary Care Physician: Davon Urrutia MD    Chief Complaint: No Medicare days        Interval History: Patient currently has no Medicare days available for potential transfer to an inpatient rehab facility.  Skilled nursing facility being considered now.    Per PT notes patient has improved mobility for rolling left and right and standby assist with verbal cueing    Allergies:   Allergies   Allergen Reactions   • Atorvastatin Other (See Comments)     LEG CRAMPS     • Amoxicillin Rash   • Escitalopram Rash   • Nabumetone Rash   • Niacin Er Rash   • Penicillins Rash   • Simvastatin Rash       Current Meds:     Current Facility-Administered Medications:   •  acetaminophen (TYLENOL) tablet 650 mg, 650 mg, Oral, Q4H PRN, 650 mg at 11/11/21 1108 **OR** acetaminophen (TYLENOL) 160 MG/5ML solution 650 mg, 650 mg, Oral, Q4H PRN **OR** acetaminophen (TYLENOL) suppository 650 mg, 650 mg, Rectal, Q4H PRN, Bandar Shea MD  •  sennosides-docusate (PERICOLACE) 8.6-50 MG per tablet 2 tablet, 2 tablet, Oral, BID, 2 tablet at 11/10/21 2049 **AND** polyethylene glycol (MIRALAX) packet 17 g, 17 g, Oral, Daily PRN **AND** bisacodyl (DULCOLAX) EC tablet 5 mg, 5 mg, Oral, Daily PRN **AND** bisacodyl (DULCOLAX) suppository 10 mg, 10 mg, Rectal, Daily PRN, Bandar Shea MD  •  carvedilol (COREG) tablet 25 mg, 25 mg, Oral, BID With Meals, Bandar Shea MD, 25 mg at 11/11/21 0841  •  ceFAZolin in 0.9% normal saline (ANCEF) IVPB solution 2 g, 2 g, Intravenous, Q8H, Maggie Bang MD, Last Rate: 200 mL/hr at 11/11/21 1111, 2 g at 11/11/21 1111  •  docusate sodium (COLACE) capsule 100 mg, 100 mg, Oral, BID, Bandar Shea MD, 100 mg at 11/10/21 2048  •  DULoxetine (CYMBALTA) DR capsule 30 mg, 30 mg, Oral, Daily, 30 mg  at 11/11/21 1109 **FOLLOWED BY** [START ON 11/17/2021] DULoxetine (CYMBALTA) DR capsule 60 mg, 60 mg, Oral, Daily, Bandar Shea MD  •  ferrous sulfate tablet 325 mg, 325 mg, Oral, Daily With Breakfast, Bandar Shea MD, 325 mg at 11/11/21 1108  •  fluconazole (DIFLUCAN) tablet 150 mg, 150 mg, Oral, Q72H, Taiwo Prado, APRN  •  fluticasone (FLONASE) 50 MCG/ACT nasal spray 1 spray, 1 spray, Nasal, BID, Bnadar Shea MD, 1 spray at 11/11/21 0851  •  folic acid (FOLVITE) tablet 1 mg, 1 mg, Oral, Daily, Bandar Shea MD, 1 mg at 11/11/21 1112  •  furosemide (LASIX) tablet 40 mg, 40 mg, Oral, Daily, Bandar Shea MD, 40 mg at 11/11/21 0841  •  heparin (porcine) 5000 UNIT/ML injection 5,000 Units, 5,000 Units, Subcutaneous, Q12H, Bandar Shea MD, 5,000 Units at 11/11/21 0846  •  hydrocortisone-bacitracin-zinc oxide-nystatin (MAGIC BARRIER) ointment 1 application, 1 application, Topical, PRN, Polly Senior, APRN  •  influenza vac split quad (FLUZONE,FLUARIX,AFLURIA,FLULAVAL) injection 0.5 mL, 0.5 mL, Intramuscular, During Hospitalization, Bandar Shea MD  •  isosorbide mononitrate (IMDUR) 24 hr tablet 60 mg, 60 mg, Oral, BID, Bandar Shea MD, 60 mg at 11/11/21 1108  •  levothyroxine (SYNTHROID, LEVOTHROID) tablet 75 mcg, 75 mcg, Oral, Q AM, Bandar Shea MD, 75 mcg at 11/11/21 0625  •  lidocaine (LIDODERM) 5 % 1 patch, 1 patch, Transdermal, Daily PRN, Bandar Shea MD, 1 patch at 11/05/21 1216  •  magnesium oxide (MAG-OX) tablet 400 mg, 400 mg, Oral, Daily, Bandar Shea MD, 400 mg at 11/11/21 1108  •  nitroglycerin (NITROSTAT) SL tablet 0.4 mg, 0.4 mg, Sublingual, Q5 Min PRN, Bandar Shea MD  •  ondansetron (ZOFRAN) tablet 4 mg, 4 mg, Oral, Q6H PRN **OR** ondansetron (ZOFRAN) injection 4 mg, 4 mg, Intravenous, Q6H PRN, Bandar Shea MD  •  pantoprazole (PROTONIX) EC tablet 40 mg, 40  "mg, Oral, Daily, Bandar Shea MD, 40 mg at 21 0841  •  polyethylene glycol (MIRALAX) packet 17 g, 17 g, Oral, Daily, Bandar Shea MD, 17 g at 11/10/21 0900  •  potassium chloride (MICRO-K) CR capsule 20 mEq, 20 mEq, Oral, BID, Bandar Shea MD, 20 mEq at 21 1108  •  saccharomyces boulardii (FLORASTOR) capsule 250 mg, 250 mg, Oral, Daily, Bandar Shea MD, 250 mg at 21 1108  •  sodium chloride 0.9 % flush 10 mL, 10 mL, Intravenous, Q12H, Bandar Shea MD, 10 mL at 21 1113  •  sodium chloride 0.9 % flush 10 mL, 10 mL, Intravenous, PRN, Bandar Shea MD  •  sucralfate (CARAFATE) tablet 1 g, 1 g, Oral, 4x Daily AC & at Bedtime, Bandar Shea MD, 1 g at 21 1113  •  thiamine (VITAMIN B-1) tablet 250 mg, 250 mg, Oral, Daily, Bandar Shea MD, 250 mg at 21 0844  •  traMADol (ULTRAM) tablet 100 mg, 100 mg, Oral, TID, Bandar Shea MD, 100 mg at 21 0841  •  valACYclovir (VALTREX) tablet 500 mg, 500 mg, Oral, Q24H, Bandar Shea MD, 500 mg at 21 1109      Review of Systems   Constitutional: Negative for fever.   Skin: Negative for rash.       Objective     Vital Signs:  Temp (24hrs), Av °F (36.7 °C), Min:97.8 °F (36.6 °C), Max:98.2 °F (36.8 °C)      /57 (BP Location: Left arm, Patient Position: Lying)   Pulse 73   Temp 98 °F (36.7 °C) (Oral)   Resp 18   Ht 167.6 cm (66\")   Wt 71.6 kg (157 lb 14.4 oz)   LMP  (LMP Unknown)   SpO2 95%   BMI 25.49 kg/m²         Physical Exam  General: Patient's nontoxic-appearing lying in bed.  She is working with physical therapy.  Nurses at bedside as well.  HEENT: Sclera anicteric and noninjected.  Respiratory: Effort even and unlabored, she is not conversationally dyspneic  Psych: Pleasant cooperative  Line/IV site: Midlineupper arm right, condition patent and no redness    Results Review:    I reviewed the patient's new " clinical results.    Lab Results:  CBC:   Lab Results   Lab 11/05/21  0902 11/11/21  0603   WBC 5.39 4.27   HEMOGLOBIN 7.2* 8.0*   HEMATOCRIT 23.6* 25.6*   PLATELETS 258 205         CMP:   Lab Results   Lab 11/05/21  0902 11/11/21  0603   SODIUM 135* 135*   POTASSIUM 4.6 5.2   CHLORIDE 100 99   CO2 27.0 27.0   BUN 25* 17   CREATININE 0.87 0.60   CALCIUM 8.8 9.3   BILIRUBIN 0.5 0.4   ALK PHOS 154* 210*   ALT (SGPT) <5 <5   AST (SGOT) 21 20   GLUCOSE 116* 82       Estimated Creatinine Clearance: 68.2 mL/min (by C-G formula based on SCr of 0.6 mg/dL).    Culture Results:    Microbiology Results (last 10 days)     ** No results found for the last 240 hours. **               Radiology:   Imaging Results (Last 72 Hours)     ** No results found for the last 72 hours. **          Assessment/Plan     Active Hospital Problems    Diagnosis    • Severe malnutrition (HCC)    • Epidural hematoma (HCC)        IMPRESSION:  1. Methicillin susceptible Staphylococcus aureus bacteremia-recurrent after prolonged bacteremia several months prior.  2. T12 compression fracture status post kyphoplasty  3. Epidural hematoma  4. Candida intertrigo of the intergluteal cleft with pressure injury right and left buttocks.  5. Fluid collection right greater trochanter status post I&D of seroma per Dr. Turner previous admission.  Cultures positive for methicillin susceptible Staphylococcus aureus felt to be consistent with infected seroma seeded from bacteremia as opposed to primary abscess       RECOMMENDATION:   · Will plan a total of 4 weeks therapy with cefazolin 2 g IV every 12 given the fact she has had recurrent methicillin susceptible Staphylococcus aureus bacteremia.  Today is day #19 of 28  · Dressing changes for hip per Dr. Turner  · Continue physical therapy  · Await definitive disposition plans      Maggie Bang MD  11/11/21  15:13 CST

## 2021-11-11 NOTE — CASE MANAGEMENT/SOCIAL WORK
Continued Stay Note   Latty     Patient Name: Tawny Shin  MRN: [id number]  Today's Date: 11/11/2021    Admit Date: 11/4/2021     Discharge Plan     Row Name 11/11/21 1137       Plan    Plan SNF private pay     Patient/Family in Agreement with Plan yes    Plan Comments Bruna from [facility] called back saying they would be unable to offer pt a bed as she has ran out of Medicare days and now on lifetime reserve medicare days. There are only a total of 60 reserve days and she is on approx 30-40 days.  They also say it looks if she has used all SNF days as well.  In order for pt to get new days she has to be out of hospital for a 60 day break.  Pt may have to be admitted to a SNF facility as private pay or medicaid pending until days established again.  This SW did call [facility] and spoke with Ashley that is not locating referral so received another number to fax in at [phone].  Will inform pt of information.    [id number]- Spoke with both pt and her son- Dr. Shin and informed of above information.  Pt wants listed at Both [facility] and [facility] that was done previously.  Jess from [facility] will follow as they do not have a bed at present time.  Roxy from [facility] aware of request.  They both aware pt would be private pay and that pt wants to discuss with facilities. Will follow.                Discharge Codes    No documentation.                     BRIAN Muñoz

## 2021-11-11 NOTE — PLAN OF CARE
Goal Outcome Evaluation:           Progress: no change   Pt alert and oriented x4. VSS. c/o pain relieved with prn meds. PPP. Heparin for vte. Tolerating reg diet. Buttocks red and excoriated. Thick white discharge coming from vagina. MD aware. New orders. In and out cath performed 500ml out. Last BM today.Call light within reach. Safety maintained.

## 2021-11-11 NOTE — THERAPY TREATMENT NOTE
Patient Name: Tawny Shin  : 1955    MRN: [id number]                              Today's Date: 2021       Admit Date: 2021    Visit Dx:     ICD-10-CM ICD-9-CM   1. Impaired mobility  Z74.09 799.89   2. Impaired mobility and ADLs  Z74.09 V49.89    Z78.9    3. Dysphagia, unspecified type  R13.10 787.20     Patient Active Problem List   Diagnosis   • T12 compression fracture, initial encounter (Formerly [facility])   • Chronic embolism and thrombosis of unspecified deep veins of left lower extremity (Formerly [facility])   • Urinary tract infection without hematuria   • Acute bilateral thoracic back pain   • Chronic anticoagulation   • Hypokalemia   • Transaminitis   • Iron deficiency anemia   • Osteoporosis   • Bacteremia   • Moderate malnutrition (CMS/Formerly [facility])   • Epidural hematoma (Formerly [facility])   • Severe malnutrition (Formerly [facility])     Past Medical History:   Diagnosis Date   • Age-related osteoporosis with current pathological fracture 2020   • Arthritis    • Asthma    • Bilateral bunions 2020   • Cancer (Formerly [facility])    • Cardiac pacemaker syndrome 2020    Overview:  - heart block - implanted    • Charcot's joint of foot, left 2020   • Chronic deep vein thrombosis (DVT) of right lower extremity (Formerly [facility]) 2021   • Chronic pain syndrome 2021   • Chronic sinusitis    • COPD (chronic obstructive pulmonary disease) (Formerly [facility])    • Coronary artery disease    • 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   • Elevated cholesterol    • Eustachian tube dysfunction    • Heart disease    • Herpes simplex    • History of transfusion    • Hyperlipidemia    • Hypertension    • Hypothyroidism 2020   • Intrinsic asthma 2020   • Knee dislocation    • Labral tear of right hip joint    • Laryngitis sicca    • Laryngitis, chronic    • Left carotid bruit 3/9/2016   • MI (myocardial infarction) (Formerly [facility])    • Myalgia due to statin 2019   • Open wound of right hip 2021   • Osteomyelitis of right femur (Formerly [facility]) 2021   •  "Otorrhea    • Pacemaker 11/17/2016   • Primary osteoarthritis of left knee 12/23/2020   • Psoriasis vulgaris 12/23/2020   • S/P coronary artery stent placement 3/9/2016   • Sensorineural hearing loss    • Seropositive rheumatoid arthritis of multiple sites (HCC) 12/27/2019    Overview:  -myochrysine '93-'96 -methotrexate '96--->11/98;r/s  restarted 2/99--> 8/14 (anemia) -sulfasalazine- not effective -penicillamine 6/98-->10/98; no effect -leflunomide 11/98--> - Humira '13-->didn't take - Enbrel 12/14-->3/15- no effect!   Last Assessment & Plan:  - \"aching all over\" because she had to be off her anti-rheumatic drugs for 2 weeks in preparation for her R knee surgery - he   • Sick sinus syndrome (Formerly [facility]) 12/27/2019   • Sjogren's disease (Formerly [facility])    • Spondylolisthesis of lumbar region 1/17/2018   • Syncope, recurrent 2/8/2021     Past Surgical History:   Procedure Laterality Date   • A-V CARDIAC PACEMAKER INSERTION  2016   • ATRIAL CARDIAC PACEMAKER INSERTION     • CARDIAC CATHETERIZATION     • CATARACT EXTRACTION     • CERVICAL CORPECTOMY N/A 3/3/2021    Procedure: CERVICAL 6 CORPECTOMY WITH TITANIUM CAGE WITH NEURO MONITORING;  Surgeon: Bandar Shea MD;  Location: [facility] OR;  Service: Neurosurgery;  Laterality: N/A;   • COLONOSCOPY  11/08/2011    One fold in the ascending colon which showed ulcer otherwise normal exam   • COLONOSCOPY  11/12/2004    Normal exam repeat in five years   • CORONARY ANGIOPLASTY WITH STENT PLACEMENT      X 2; 2013 & 2014   • ENDOSCOPY  07/10/2014    Normal exam   • FLAP LEG Right 9/14/2021    Procedure: RIGHT GLUTEAL FASCIOCUTANEOUS ADVANCEMENT FLAP AND RIGHT TENSOR FASCIAL JESSICA FLAP;  Surgeon: Amadeo Turner MD;  Location:  [facility] OR;  Service: Plastics;  Laterality: Right;   • HIP ABDUCTION TENOTOMY BILATERAL Right 1/14/2021    Procedure: RIGHT HIP GLUTEUS MEDLUS / MINIMUS REPAIR, POSSIBLE ACHILLES ALLOGRAFT;  Surgeon: Nino Carlson MD;  Location:  [facility] OR;  Service: Orthopedics;  " Laterality: Right;   • INCISION AND DRAINAGE HIP Right 2/9/2021    Procedure: HIP INCISION AND DRAINAGE;  Surgeon: Nino Carlson MD;  Location:  [facility] OR;  Service: Orthopedics;  Laterality: Right;   • INCISION AND DRAINAGE LEG Right 10/24/2021    Procedure: INCISION AND DRAINAGE LOWER EXTREMITY;  Surgeon: Amadeo Turner MD;  Location:  [facility] OR;  Service: Plastics;  Laterality: Right;   • INCISION AND DRAINAGE OF WOUND Right 7/8/2021    Procedure: INCISION AND DRAINAGE WOUND RIGHT HIP;  Surgeon: James Huntley MD;  Location:  [facility] OR;  Service: Orthopedics;  Laterality: Right;   • JOINT REPLACEMENT     • KYPHOPLASTY WITH BIOPSY Bilateral 10/26/2021    Procedure: THOARCIC 12 KYPHOPLASTY WITH BIOPSY;  Surgeon: Bandar Shea MD;  Location:  [facility] OR;  Service: Neurosurgery;  Laterality: Bilateral;   • LEG DEBRIDEMENT Right 9/14/2021    Procedure: DEBRIDEMENT OF RIGHT HIP WOUND, RIGHT GLUTEAL FASCIOCUTANEOUS ADVANCEMENT FLAP AND RIGHT TENSOR FASCIAL JESSICA FLAP;  Surgeon: Amadeo Turner MD;  Location:  [facility] OR;  Service: Plastics;  Laterality: Right;   • LUMBAR DISCECTOMY Right 3/23/2021    Procedure: LUMBAR DISCECTOMY MICRO, Lumbar 1/2 right;  Surgeon: Bandar Shea MD;  Location: [facility] OR;  Service: Neurosurgery;  Laterality: Right;   • LUMBAR FUSION N/A 1/19/2018    Procedure: L3-4,L4-5 DECOMPRESSION, POSTERIOR SPINAL FUSION WITH INSTRUMENTATION;  Surgeon: Fortino Oropeza MD;  Location: [facility] OR;  Service:    • LUMBAR LAMINECTOMY WITH FUSION Left 1/17/2018    Procedure: LEFT L3-4 L4-5 LATERAL LUMBAR INTERBODY FUSION;  Surgeon: Fortino Oropeza MD;  Location: [facility] OR;  Service:    • MYRINGOTOMY W/ TUBES  09/04/2014    TUBES NO LONGER IN PLACE   • OTHER SURGICAL HISTORY      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 so hardware was removed and spacers were placed   • REPLACEMENT TOTAL KNEE Right       General Information     Row Name 11/11/21 5050          Physical Therapy Time and  Intention    Document Type therapy note (daily note)  -MS (r) NN (t) MS (c)     Mode of Treatment physical therapy  -MS (r) NN (t) MS (c)     Row Name 11/11/21 1440          General Information    Patient Profile Reviewed yes  -MS (r) NN (t) MS (c)     Existing Precautions/Restrictions fall; spinal  -MS (r) NN (t) MS (c)     Row Name 11/11/21 1440          Cognition    Orientation Status (Cognition) oriented x 4  -MS (r) NN (t) MS (c)           User Key  (r) = Recorded By, (t) = Taken By, (c) = Cosigned By    Initials Name Provider Type    MS Nicole Olson R, PT, DPT, NCS Physical Therapist    NN Ana Bender, PT Student PT Student               Mobility     Row Name 11/11/21 1440          Bed Mobility    Bed Mobility rolling left; rolling right; scooting/bridging  -MS (r) NN (t) MS (c)     Rolling Left [place] (Bed Mobility) standby assist; verbal cues  -MS (r) NN (t) MS (c)     Rolling Right [place] (Bed Mobility) standby assist; verbal cues  -MS (r) NN (t) MS (c)     Scooting/Bridging [place] (Bed Mobility) maximum assist (25% patient effort); 2 person assist  -MS (r) NN (t) MS (c)     Assistive Device (Bed Mobility) bed rails; draw sheet; head of bed elevated  -MS (r) NN (t) MS (c)           User Key  (r) = Recorded By, (t) = Taken By, (c) = Cosigned By    Initials Name Provider Type    MS Nicole Olson R, PT, DPT, NCS Physical Therapist    NN Ana Bender, PT Student PT Student               Obj/Interventions     Row Name 11/11/21 1440          Motor Skills    Therapeutic Exercise hip; knee; ankle  -MS (r) NN (t) MS (c)     Row Name 11/11/21 1440          Shoulder (Therapeutic Exercise)    Shoulder (Therapeutic Exercise) AROM (active range of motion)  -MS (r) NN (t) MS (c)     Shoulder AROM (Therapeutic Exercise) bilateral; 3 repetitions  D2 PNF Pattern  -MS (r) NN (t) MS (c)     Row Name 11/11/21 1440          Hip (Therapeutic Exercise)    Hip (Therapeutic Exercise) AAROM (active assistive  range of motion)  -MS (r) NN (t) MS (c)     Hip AAROM (Therapeutic Exercise) bilateral; flexion; extension; aBduction; aDduction; external rotation; internal rotation; supine; 10 repetitions  -MS (r) NN (t) MS (c)     Row Name 11/11/21 1440          Knee (Therapeutic Exercise)    Knee (Therapeutic Exercise) AAROM (active assistive range of motion)  -MS (r) NN (t) MS (c)     Knee AAROM (Therapeutic Exercise) bilateral; flexion; extension; supine; 10 repetitions  -MS (r) NN (t) MS (c)     Row Name 11/11/21 1440          Ankle (Therapeutic Exercise)    Ankle (Therapeutic Exercise) AAROM (active assistive range of motion)  -MS (r) NN (t) MS (c)     Ankle AAROM (Therapeutic Exercise) bilateral; dorsiflexion; plantarflexion; supine; 10 repetitions  -MS (r) NN (t) MS (c)           User Key  (r) = Recorded By, (t) = Taken By, (c) = Cosigned By    Initials Name Provider Type    Nicole Palacios R, PT, DPT, NCS Physical Therapist    NN Ana Bender, PT Student PT Student               Goals/Plan    No documentation.                Clinical Impression     Row Name 11/11/21 1440          Pain    Additional Documentation Pain Scale: Numbers Pre/Post-Treatment (Group)  -MS (r) NN (t) MS (c)     Row Name 11/11/21 1440          Pain Scale: Numbers Pre/Post-Treatment    Pretreatment Pain Rating 0/10 - no pain  -MS (r) NN (t) MS (c)     Posttreatment Pain Rating 0/10 - no pain  -MS (r) NN (t) MS (c)     Pain Intervention(s) Medication (See MAR); Repositioned; Ambulation/increased activity  -MS (r) NN (t) MS (c)     Row Name 11/11/21 1440          Plan of Care Review    Plan of Care Reviewed With patient  -MS (r) NN (t) MS (c)     Outcome Summary PT tx complete: pt AOx4. pt demonstrates improved mobility for rolling L & R as pt was SBA with verbal cuing. pt participated in BLE AAROM exercises. pt is able to complete 2-3 good reps before fatigue sets in and needs encouragement and cuing to keep going. pt was given yellow theraband  to use for UE exercises. pt completed 3 repetitions of D2 PNF pattern. pt tolerated all exercises with little to no pain. pt to continue with skilled PT for ROM and strength training. D/C recommendation inpatient rehab vs SNF  -MS (r) NN (t) MS (c)     Row Name 11/11/21 1440          Positioning and Restraints    Pre-Treatment Position in bed  -MS (r) NN (t) MS (c)     Post Treatment Position bed  -MS (r) NN (t) MS (c)     In Bed fowlers; call light within reach; encouraged to call for assist  -MS (r) NN (t) MS (c)           User Key  (r) = Recorded By, (t) = Taken By, (c) = Cosigned By    Initials Name Provider Type    Nicole Palacios, PT, DPT, NCS Physical Therapist    Ana Kaminski, PT Student PT Student               Outcome Measures     Row Name 11/11/21 1440          How much help from another person do you currently need...    Turning from your back to your side while in flat bed without using bedrails? 3  -MS (r) NN (t) MS (c)     Moving from lying on back to sitting on the side of a flat bed without bedrails? 3  -MS (r) NN (t) MS (c)     Moving to and from a bed to a chair (including a wheelchair)? 2  -MS (r) NN (t) MS (c)     Standing up from a chair using your arms (e.g., wheelchair, bedside chair)? 1  -MS (r) NN (t) MS (c)     Climbing 3-5 steps with a railing? 1  -MS (r) NN (t) MS (c)     To walk in hospital room? 1  -MS (r) NN (t) MS (c)     AM-PAC 6 Clicks Score (PT) 11  -MS (r) NN (t)     Row Name 11/11/21 1440          Functional Assessment    Outcome Measure Options AM-PAC 6 Clicks Basic Mobility (PT)  -MS (r) NN (t) MS (c)           User Key  (r) = Recorded By, (t) = Taken By, (c) = Cosigned By    Initials Name Provider Type    Nicole Palacios, PT, DPT, NCS Physical Therapist    Ana Kaminski, PT Student PT Student                             Physical Therapy Education                 Title: PT OT SLP Therapies (In Progress)     Topic: Physical Therapy (In Progress)     Point:  Mobility training (Done)     Learning Progress Summary           Patient Acceptance, E, VU by NN at 11/11/2021 1440    Comment: positioning for rolling    Acceptance, E, VU by MS at 11/10/2021 1452    Comment: need for rest breaks between exercise to allow for better movement    Acceptance, E, NR by MS at 11/9/2021 1156    Comment: slide board transfer    Acceptance, E, VU,NR by  at 11/4/2021 1509    Comment: Educated pt on spinal precautions, proper body mechanics during bed mobility transfers, POC, and d/c.                   Point: Home exercise program (Not Started)     Learner Progress:  Not documented in this visit.          Point: Body mechanics (Done)     Learning Progress Summary           Patient Acceptance, E, VU by  at 11/11/2021 1440    Comment: positioning for rolling    Acceptance, E, VU,NR by  at 11/4/2021 1509    Comment: Educated pt on spinal precautions, proper body mechanics during bed mobility transfers, POC, and d/c.                   Point: Precautions (Done)     Learning Progress Summary           Patient Acceptance, E, VU by  at 11/11/2021 1440    Comment: positioning for rolling    Acceptance, E,TB, VU by  at 11/7/2021 1027    Comment: positioning    Acceptance, E, VU,NR by  at 11/4/2021 1509    Comment: Educated pt on spinal precautions, proper body mechanics during bed mobility transfers, POC, and d/c.                               User Key     Initials Effective Dates Name Provider Type Discipline     06/16/21 -  Olga Chambers, PTA Physical Therapy Assistant PT    MS 06/19/18 -  Nicole Olson, PT, DPT, NCS Physical Therapist PT     08/18/21 -  Ana Bender, PT Student PT Student PT     09/07/21 -  Juliana Angela, PT Student PT Student PT              PT Recommendation and Plan     Plan of Care Reviewed With: patient  Outcome Summary: PT tx complete: pt AOx4. pt demonstrates improved mobility for rolling L & R as pt was SBA with verbal cuing. pt participated in  BLE AAROM exercises. pt is able to complete 2-3 good reps before fatigue sets in and needs encouragement and cuing to keep going. pt was given yellow theraband to use for UE exercises. pt completed 3 repetitions of D2 PNF pattern. pt tolerated all exercises with little to no pain. pt to continue with skilled PT for ROM and strength training. D/C recommendation inpatient rehab vs SNF     Time Calculation:    PT Charges     Row Name 11/11/21 1544             Time Calculation    Start Time 1440  -MS (r) NN (t) MS (c)      Stop Time 1530  -MS (r) NN (t) MS (c)      Time Calculation (min) 50 min  -MS (r) NN (t)      PT Received On 11/11/21  -MS (r) NN (t) MS (c)              Time Calculation- PT    Total Timed Code Minutes- PT 50 minute(s)  -MS (r) NN (t) MS (c)              Timed Charges    93527 - PT Therapeutic Exercise Minutes 35  -MS (r) NN (t) MS (c)      37248 - PT Therapeutic Activity Minutes 15  -MS (r) NN (t) MS (c)              Total Minutes    Timed Charges Total Minutes 50  -MS (r) NN (t)       Total Minutes 50  -MS (r) NN (t)            User Key  (r) = Recorded By, (t) = Taken By, (c) = Cosigned By    Initials Name Provider Type    MS Wesley Nicole R, PT, DPT, NCS Physical Therapist    Ana Kaminski, PT Student PT Student                  PT G-Codes  Outcome Measure Options: AM-PAC 6 Clicks Basic Mobility (PT)  AM-PAC 6 Clicks Score (PT): 11  AM-PAC 6 Clicks Score (OT): 15    Ana Bender PT Student  11/11/2021

## 2021-11-12 VITALS
HEIGHT: 66 IN | WEIGHT: 157.9 LBS | RESPIRATION RATE: 18 BRPM | OXYGEN SATURATION: 96 % | DIASTOLIC BLOOD PRESSURE: 71 MMHG | BODY MASS INDEX: 25.38 KG/M2 | TEMPERATURE: 97.5 F | SYSTOLIC BLOOD PRESSURE: 186 MMHG | HEART RATE: 66 BPM

## 2021-11-12 LAB — SARS-COV-2 RNA PNL SPEC NAA+PROBE: NOT DETECTED

## 2021-11-12 PROCEDURE — 97110 THERAPEUTIC EXERCISES: CPT | Performed by: PHYSICAL THERAPIST

## 2021-11-12 PROCEDURE — 99024 POSTOP FOLLOW-UP VISIT: CPT | Performed by: NURSE PRACTITIONER

## 2021-11-12 PROCEDURE — 97530 THERAPEUTIC ACTIVITIES: CPT | Performed by: PHYSICAL THERAPIST

## 2021-11-12 PROCEDURE — 25010000002 CEFAZOLIN PER 500 MG: Performed by: INTERNAL MEDICINE

## 2021-11-12 PROCEDURE — 87635 SARS-COV-2 COVID-19 AMP PRB: CPT | Performed by: NEUROLOGICAL SURGERY

## 2021-11-12 PROCEDURE — 25010000002 HEPARIN (PORCINE) PER 1000 UNITS: Performed by: NEUROLOGICAL SURGERY

## 2021-11-12 RX ORDER — DULOXETIN HYDROCHLORIDE 30 MG/1
30 CAPSULE, DELAYED RELEASE ORAL DAILY
Qty: 4 CAPSULE | Refills: 0
Start: 2021-11-13 | End: 2022-03-04

## 2021-11-12 RX ORDER — DULOXETIN HYDROCHLORIDE 60 MG/1
60 CAPSULE, DELAYED RELEASE ORAL DAILY
Status: ON HOLD
Start: 2021-11-17 | End: 2022-06-04

## 2021-11-12 RX ORDER — HEPARIN SODIUM 5000 [USP'U]/ML
5000 INJECTION, SOLUTION INTRAVENOUS; SUBCUTANEOUS EVERY 12 HOURS SCHEDULED
Start: 2021-11-12 | End: 2022-03-28

## 2021-11-12 RX ORDER — FLUCONAZOLE 150 MG/1
150 TABLET ORAL
Qty: 1 TABLET | Refills: 0
Start: 2021-11-14 | End: 2021-11-15

## 2021-11-12 RX ORDER — BUPIVACAINE HCL/0.9 % NACL/PF 0.1 %
2 PLASTIC BAG, INJECTION (ML) EPIDURAL EVERY 8 HOURS
Qty: 2300 ML | Refills: 0
Start: 2021-11-12 | End: 2021-11-20

## 2021-11-12 RX ORDER — AMOXICILLIN 250 MG
2 CAPSULE ORAL 2 TIMES DAILY
Start: 2021-11-12 | End: 2023-01-11

## 2021-11-12 RX ORDER — TRAMADOL HYDROCHLORIDE 50 MG/1
100 TABLET ORAL 3 TIMES DAILY
Qty: 18 TABLET | Refills: 0 | Status: SHIPPED | OUTPATIENT
Start: 2021-11-12 | End: 2021-11-15

## 2021-11-12 RX ORDER — ONDANSETRON 4 MG/1
4 TABLET, FILM COATED ORAL EVERY 6 HOURS PRN
Status: ON HOLD
Start: 2021-11-12 | End: 2022-04-23

## 2021-11-12 RX ORDER — POLYETHYLENE GLYCOL 3350 17 G/17G
17 POWDER, FOR SOLUTION ORAL DAILY PRN
Status: ON HOLD
Start: 2021-11-12 | End: 2022-04-23

## 2021-11-12 RX ADMIN — FERROUS SULFATE TAB 325 MG (65 MG ELEMENTAL FE) 325 MG: 325 (65 FE) TAB at 08:04

## 2021-11-12 RX ADMIN — DOCUSATE SODIUM 50 MG AND SENNOSIDES 8.6 MG 2 TABLET: 8.6; 5 TABLET, FILM COATED ORAL at 20:11

## 2021-11-12 RX ADMIN — SUCRALFATE 1 G: 1 TABLET ORAL at 18:11

## 2021-11-12 RX ADMIN — SUCRALFATE 1 G: 1 TABLET ORAL at 08:04

## 2021-11-12 RX ADMIN — LEVOTHYROXINE SODIUM 75 MCG: 75 TABLET ORAL at 05:47

## 2021-11-12 RX ADMIN — ACETAMINOPHEN 650 MG: 325 TABLET, FILM COATED ORAL at 08:06

## 2021-11-12 RX ADMIN — DOCUSATE SODIUM 100 MG: 100 CAPSULE, LIQUID FILLED ORAL at 08:05

## 2021-11-12 RX ADMIN — DOCUSATE SODIUM 100 MG: 100 CAPSULE, LIQUID FILLED ORAL at 20:11

## 2021-11-12 RX ADMIN — Medication 250 MG: at 08:04

## 2021-11-12 RX ADMIN — DOCUSATE SODIUM 50 MG AND SENNOSIDES 8.6 MG 2 TABLET: 8.6; 5 TABLET, FILM COATED ORAL at 08:05

## 2021-11-12 RX ADMIN — HEPARIN SODIUM 5000 UNITS: 5000 INJECTION INTRAVENOUS; SUBCUTANEOUS at 20:11

## 2021-11-12 RX ADMIN — SUCRALFATE 1 G: 1 TABLET ORAL at 12:05

## 2021-11-12 RX ADMIN — CEFAZOLIN 2 G: 10 INJECTION, POWDER, FOR SOLUTION INTRAVENOUS at 15:55

## 2021-11-12 RX ADMIN — ISOSORBIDE MONONITRATE 60 MG: 60 TABLET, EXTENDED RELEASE ORAL at 08:04

## 2021-11-12 RX ADMIN — FOLIC ACID 1 MG: 1 TABLET ORAL at 08:05

## 2021-11-12 RX ADMIN — SODIUM CHLORIDE, PRESERVATIVE FREE 10 ML: 5 INJECTION INTRAVENOUS at 20:12

## 2021-11-12 RX ADMIN — TRAMADOL HYDROCHLORIDE 100 MG: 50 TABLET ORAL at 20:10

## 2021-11-12 RX ADMIN — POTASSIUM CHLORIDE 20 MEQ: 10 CAPSULE, COATED, EXTENDED RELEASE ORAL at 20:10

## 2021-11-12 RX ADMIN — TRAMADOL HYDROCHLORIDE 100 MG: 50 TABLET ORAL at 08:06

## 2021-11-12 RX ADMIN — FUROSEMIDE 40 MG: 40 TABLET ORAL at 08:04

## 2021-11-12 RX ADMIN — HEPARIN SODIUM 5000 UNITS: 5000 INJECTION INTRAVENOUS; SUBCUTANEOUS at 08:06

## 2021-11-12 RX ADMIN — TRAMADOL HYDROCHLORIDE 100 MG: 50 TABLET ORAL at 15:55

## 2021-11-12 RX ADMIN — CEFAZOLIN 2 G: 10 INJECTION, POWDER, FOR SOLUTION INTRAVENOUS at 00:07

## 2021-11-12 RX ADMIN — CARVEDILOL 25 MG: 25 TABLET, FILM COATED ORAL at 18:11

## 2021-11-12 RX ADMIN — THIAMINE HCL TAB 100 MG 250 MG: 100 TAB at 08:06

## 2021-11-12 RX ADMIN — POLYETHYLENE GLYCOL 3350 17 G: 17 POWDER, FOR SOLUTION ORAL at 08:06

## 2021-11-12 RX ADMIN — ACETAMINOPHEN 650 MG: 325 TABLET, FILM COATED ORAL at 03:33

## 2021-11-12 RX ADMIN — SUCRALFATE 1 G: 1 TABLET ORAL at 20:11

## 2021-11-12 RX ADMIN — VALACYCLOVIR HYDROCHLORIDE 500 MG: 500 TABLET, FILM COATED ORAL at 08:04

## 2021-11-12 RX ADMIN — SODIUM CHLORIDE, PRESERVATIVE FREE 10 ML: 5 INJECTION INTRAVENOUS at 08:26

## 2021-11-12 RX ADMIN — PANTOPRAZOLE SODIUM 40 MG: 40 TABLET, DELAYED RELEASE ORAL at 08:06

## 2021-11-12 RX ADMIN — POTASSIUM CHLORIDE 20 MEQ: 10 CAPSULE, COATED, EXTENDED RELEASE ORAL at 08:04

## 2021-11-12 RX ADMIN — CEFAZOLIN 2 G: 10 INJECTION, POWDER, FOR SOLUTION INTRAVENOUS at 08:07

## 2021-11-12 RX ADMIN — CARVEDILOL 25 MG: 25 TABLET, FILM COATED ORAL at 08:04

## 2021-11-12 RX ADMIN — FLUTICASONE PROPIONATE 1 SPRAY: 50 SPRAY, METERED NASAL at 08:07

## 2021-11-12 RX ADMIN — DULOXETINE HYDROCHLORIDE 30 MG: 30 CAPSULE, DELAYED RELEASE ORAL at 08:04

## 2021-11-12 RX ADMIN — MAGNESIUM GLUCONATE 500 MG ORAL TABLET 400 MG: 500 TABLET ORAL at 08:04

## 2021-11-12 RX ADMIN — ISOSORBIDE MONONITRATE 60 MG: 60 TABLET, EXTENDED RELEASE ORAL at 20:11

## 2021-11-12 NOTE — DISCHARGE INSTRUCTIONS
[facility] Neurosurgery    Dear Patient,  You recently were admitted to the hospital for worsening lower extremity weakness and are now ready to go home. These written instructions are intended to help you to recover quickly.  • If you have ANY QUESTIONS about your condition prior to discharge please ask Dr. Shea. In particular, if you have concerns about going home discuss them now. We do not want you to go until you are completely comfortable leaving the hospital.   • If you have ANY QUESTIONS about your condition after you go home call your doctor. The number is [phone] which is answered 24 hours a day. During regular working hours a  will connect you to your doctor, one of his partners, or one of our nurses. At night or on weekends the answering service will connect you with the physicianon call. DO NOT HESITATE to call. We want to help you with any problems.     Seizures  Anyone who has brain injury has a small risk of seizures. Seizures may involve involuntary shaking of the arms and legs, loss of consciousness, loss of urinary or bowel control. They typically last a few minutes, then the patient returns to normal. Seizures are frightening to watch, but usually resolve without long-term problems. Your doctor will often attempt to prevent seizures after surgery by putting you on anti-seizure medicine like Dilantin or Keppra. Sometimes seizures occur even when these medicines are used  What to do if a seizure occurs:  • If a seizure occurs and the patient does not return to normal in 10 minutes, call your Dr. Shea or go to the local emergency room.   • If multiple seizures occur, call 911.     Neurological Deficit  Neurological deficits are problems with brain function like speech difficulty, weakness, numbness, imbalance, etc. These deficits may be present before or after brain injury. Prior to discharge Dr. Shea will make sure that all treatment needed to help you recover from  such deficits has been instituted. He will also make sure that these deficits are stable or improving. After you go home, if you think any of your brain problems are getting worse, not better, it may be a sign of bleeding, infection, or other problems. Call Dr. Shea. He will order tests and prescribe treatment as needed.    Deep vein thrombosis/ pulmonary embolus  Some patients who are admitted to the hospital develop blood clots in the veins of the legs. These are caused by decreased walking and laying in bed.  These clots can cause pain or swelling in the legs, or may cause no obvious problem. They can break free from the legs and travel to the lungs causing shortness of breath and/or chest pain. If you develop pain or swelling in your legs after surgery, call your doctor. If you develop breathing problems or chest pain after surgery, call 911.    Medication  It is important to take your medication EXACTLY as prescribed. Some patients are reluctant to take pain medication. It is perfectly fine to take pain medication for several weeks after injury. We want to eliminate pain whenever possible. Many pain medications can cause nausea (sick to your stomach), constipation (inability to poop), or itching. Nausea may be minimized by taking the medication with food. Constipation can be relieved by taking stool softeners and/ or laxatives that you can purchase over the counter as needed. Some medications, like steroids or seizure medications, should not be stopped abruptly. They need to be weaned off over time. For instructions on weaning schedules call your doctor. Sometimes patients develop an allergy to a medication that was started during hospitalization. Most frequently an allergy will cause an itchy rash. Call your doctor if you think you might be having an allergic reaction.    If you are near completion of your pain medications and feel that you require additional medications for pain, please notify the  neurosurgical team 2-3 days before running out of pain medications.    Hydrocephalus  Your brain manufactures about one quart of clear fluid (called cerebrospinal fluid or CSF) every day. Normally this fluid is reabsorbed into the blood stream. After brain injury the flow of fluid and the reabsorption process may be impaired. This leads to a buildup of water on the brain that is called hydrocephalus. Hydrocephalus can cause headache, somnolence, difficulty thinking, imbalance and loss of urinary control. If you think that the patient may have hydrocephalus, call your doctor. She/He will order a brain scan. If it shows water buildup a simple operation called a shunt may be needed to fix the problem.    How to contact your doctor  The neurosurgeon, Dr. Shea, and her/his team did your surgery and, therefore, are likely to know more about your condition than any other physicians. We are immediately available to help you with any problems after surgery. Please call us for any concerns at the following numbers:   • Doctor’s office [phone] (answered 24 hours a day)   • [facility]  [phone] (alternative emergency number for on-call neurosurgeon)     Specific instructions  Diet: no restrictions, eat a heart healthy diet.   Activity: as tolerated, no heavy lifting or strenuous exercise for at least 2 weeks.  ?  Follow up:   You should call as soon as possible for follow up with Dr. Shea in the neurosurgery clinic ([phone]) in 4-6 weeks.

## 2021-11-12 NOTE — PLAN OF CARE
Goal Outcome Evaluation:  Plan of Care Reviewed With: patient        Progress: no change  Outcome Summary: Pt alert and oriented x4.  Pt still has not voiding on her own.  Straight cath this shift with 600 mls of output.  Pt has not reported any symptoms of bladder distention.  Pt has heel boots on, turned Q2.  Pt had c/o of pain with movement, reposition help with pts c/o of pain at rest.  Call light in reach, safety maintained.

## 2021-11-12 NOTE — CASE MANAGEMENT/SOCIAL WORK
Continued Stay Note   [facility]     Patient Name: Tawny Shin  MRN: [id number]  Today's Date: 11/12/2021    Admit Date: 11/4/2021     Discharge Plan     Row Name 11/12/21 1592       Plan    Final Note Pt is being discharged to [facility]. Roxy, magaly, is aware.   Phone: [phone]         Fax: [phone]            Row Name 11/12/21 1306       Plan    Plan Spoke with patient/sister regarding dc plans. Patient wants to accept bed at [facility] . Would like to stay in hospital until Monday if possible. MD notified and will speak with patient about going to SNF today instead. MEGA updated and notifying Darline with [facility].               Discharge Codes    No documentation.               Expected Discharge Date and Time     Expected Discharge Date Expected Discharge Time    Nov 12, 2021             Bianca Barragan

## 2021-11-12 NOTE — PROGRESS NOTES
INFECTIOUS DISEASES PROGRESS NOTE    Patient:  Tawny Shin  YOB: 1955  MRN: [id number]   Admit date: 11/4/2021   Admitting Physician: Bandar Shea, *  Primary Care Physician: Davon Urrutia MD    Chief Complaint: Nauseated        Interval History: Patient currently has no Medicare days available for potential transfer to an inpatient rehab facility.  Skilled nursing facility being considered now.    Patient notes that she got somewhat sick after taking her morning pills. Discussed with nursing, they report she gagged a little bit but did not have rolf emesis.    Allergies:   Allergies   Allergen Reactions   • Atorvastatin Other (See Comments)     LEG CRAMPS     • Amoxicillin Rash   • Escitalopram Rash   • Nabumetone Rash   • Niacin Er Rash   • Penicillins Rash   • Simvastatin Rash       Current Meds:     Current Facility-Administered Medications:   •  acetaminophen (TYLENOL) tablet 650 mg, 650 mg, Oral, Q4H PRN, 650 mg at 11/12/21 0806 **OR** acetaminophen (TYLENOL) 160 MG/5ML solution 650 mg, 650 mg, Oral, Q4H PRN **OR** acetaminophen (TYLENOL) suppository 650 mg, 650 mg, Rectal, Q4H PRN, Bandar Shea MD  •  sennosides-docusate (PERICOLACE) 8.6-50 MG per tablet 2 tablet, 2 tablet, Oral, BID, 2 tablet at 11/12/21 0805 **AND** polyethylene glycol (MIRALAX) packet 17 g, 17 g, Oral, Daily PRN **AND** bisacodyl (DULCOLAX) EC tablet 5 mg, 5 mg, Oral, Daily PRN **AND** bisacodyl (DULCOLAX) suppository 10 mg, 10 mg, Rectal, Daily PRN, Bandar Shea MD  •  carvedilol (COREG) tablet 25 mg, 25 mg, Oral, BID With Meals, Bandar Shea MD, 25 mg at 11/12/21 0804  •  ceFAZolin in 0.9% normal saline (ANCEF) IVPB solution 2 g, 2 g, Intravenous, Q8H, Maggie Bang MD, Last Rate: 200 mL/hr at 11/12/21 0807, 2 g at 11/12/21 0807  •  docusate sodium (COLACE) capsule 100 mg, 100 mg, Oral, BID, Bandar Shea MD, 100 mg at 11/12/21 0805  •  DULoxetine  (CYMBALTA) DR capsule 30 mg, 30 mg, Oral, Daily, 30 mg at 11/12/21 0804 **FOLLOWED BY** [START ON 11/17/2021] DULoxetine (CYMBALTA) DR capsule 60 mg, 60 mg, Oral, Daily, Bandar Shea MD  •  ferrous sulfate tablet 325 mg, 325 mg, Oral, Daily With Breakfast, Bandar Shea MD, 325 mg at 11/12/21 0804  •  fluconazole (DIFLUCAN) tablet 150 mg, 150 mg, Oral, Q72H, Taiwo Prado, APRN, 150 mg at 11/11/21 1519  •  fluticasone (FLONASE) 50 MCG/ACT nasal spray 1 spray, 1 spray, Nasal, BID, Bandar Shea MD, 1 spray at 11/12/21 0807  •  folic acid (FOLVITE) tablet 1 mg, 1 mg, Oral, Daily, Bandar Shea MD, 1 mg at 11/12/21 0805  •  furosemide (LASIX) tablet 40 mg, 40 mg, Oral, Daily, Bandar Shea MD, 40 mg at 11/12/21 0804  •  heparin (porcine) 5000 UNIT/ML injection 5,000 Units, 5,000 Units, Subcutaneous, Q12H, Bandar Shea MD, 5,000 Units at 11/12/21 0806  •  hydrocortisone-bacitracin-zinc oxide-nystatin (MAGIC BARRIER) ointment 1 application, 1 application, Topical, PRN, Polly Senior, APRN  •  influenza vac split quad (FLUZONE,FLUARIX,AFLURIA,FLULAVAL) injection 0.5 mL, 0.5 mL, Intramuscular, During Hospitalization, Bandar Shea MD  •  isosorbide mononitrate (IMDUR) 24 hr tablet 60 mg, 60 mg, Oral, BID, Bandar Shea MD, 60 mg at 11/12/21 0804  •  levothyroxine (SYNTHROID, LEVOTHROID) tablet 75 mcg, 75 mcg, Oral, Q AM, Bandar Shea MD, 75 mcg at 11/12/21 0547  •  lidocaine (LIDODERM) 5 % 1 patch, 1 patch, Transdermal, Daily PRN, Bandar Shea MD, 1 patch at 11/05/21 1216  •  magnesium oxide (MAG-OX) tablet 400 mg, 400 mg, Oral, Daily, Bandar Shea MD, 400 mg at 11/12/21 0804  •  nitroglycerin (NITROSTAT) SL tablet 0.4 mg, 0.4 mg, Sublingual, Q5 Min PRN, Bandar Shea MD  •  ondansetron (ZOFRAN) tablet 4 mg, 4 mg, Oral, Q6H PRN **OR** ondansetron (ZOFRAN) injection 4 mg, 4 mg, Intravenous, Q6H  "PRN, Bandar Shea MD  •  pantoprazole (PROTONIX) EC tablet 40 mg, 40 mg, Oral, Daily, Bandar Shea MD, 40 mg at 21  •  polyethylene glycol (MIRALAX) packet 17 g, 17 g, Oral, Daily, Bandar Shea MD, 17 g at 21  •  potassium chloride (MICRO-K) CR capsule 20 mEq, 20 mEq, Oral, BID, Bandar Shea MD, 20 mEq at 21  •  saccharomyces boulardii (FLORASTOR) capsule 250 mg, 250 mg, Oral, Daily, Bandar Shea MD, 250 mg at 21  •  sodium chloride 0.9 % flush 10 mL, 10 mL, Intravenous, Q12H, Bandar Shea MD, 10 mL at 21  •  sodium chloride 0.9 % flush 10 mL, 10 mL, Intravenous, PRN, Bandar Shea MD  •  sucralfate (CARAFATE) tablet 1 g, 1 g, Oral, 4x Daily AC & at Bedtime, Bandar Shea MD, 1 g at 21  •  thiamine (VITAMIN B-1) tablet 250 mg, 250 mg, Oral, Daily, Bandar Shea MD, 250 mg at 21  •  traMADol (ULTRAM) tablet 100 mg, 100 mg, Oral, TID, Bandar Shea MD, 100 mg at 21  •  valACYclovir (VALTREX) tablet 500 mg, 500 mg, Oral, Q24H, Bandar Shea MD, 500 mg at 21      Review of Systems   Constitutional: Negative for fever.   Gastrointestinal: Positive for nausea.   Skin: Negative for rash.       Objective     Vital Signs:  Temp (24hrs), Av.8 °F (36.6 °C), Min:97.6 °F (36.4 °C), Max:97.9 °F (36.6 °C)      /64 (BP Location: Left arm, Patient Position: Lying) Comment: notified nurse of high bp  Pulse 71   Temp 97.9 °F (36.6 °C) (Axillary)   Resp 16   Ht 167.6 cm (66\")   Wt 71.6 kg (157 lb 14.4 oz)   LMP  (LMP Unknown)   SpO2 95%   BMI 25.49 kg/m²         Physical Exam  General: Patient's chronically ill appearing lying in bed.    HEENT: Sclera anicteric and noninjected.  Respiratory: Effort even and unlabored, she is not conversationally dyspneic  Right hip dressing is clean dry and intact.  Psych: " Miguel correa  Line/IV site: Midlineupper arm right, condition patent and no redness    Results Review:    I reviewed the patient's new clinical results.    Lab Results:  CBC:   Lab Results   Lab 11/11/21  0603   WBC 4.27   HEMOGLOBIN 8.0*   HEMATOCRIT 25.6*   PLATELETS 205         CMP:   Lab Results   Lab 11/11/21  0603   SODIUM 135*   POTASSIUM 5.2   CHLORIDE 99   CO2 27.0   BUN 17   CREATININE 0.60   CALCIUM 9.3   BILIRUBIN 0.4   ALK PHOS 210*   ALT (SGPT) <5   AST (SGOT) 20   GLUCOSE 82       Estimated Creatinine Clearance: 68.2 mL/min (by C-G formula based on SCr of 0.6 mg/dL).    Culture Results:    Microbiology Results (last 10 days)     ** No results found for the last 240 hours. **               Radiology:   Imaging Results (Last 72 Hours)     ** No results found for the last 72 hours. **          Assessment/Plan     Active Hospital Problems    Diagnosis    • Severe malnutrition (HCC)    • Epidural hematoma (HCC)        IMPRESSION:  1. Methicillin susceptible Staphylococcus aureus bacteremia-recurrent after prolonged bacteremia several months prior.  2. T12 compression fracture status post kyphoplasty  3. Epidural hematoma urinary retention-Per nursing Horan catheter removed several days ago and patient is requiring in and out catheterization  4. Candida intertrigo of the intergluteal cleft with pressure injury right and left buttocks.  5. Fluid collection right greater trochanter status post I&D of seroma per Dr. Turner previous admission.  Cultures positive for methicillin susceptible Staphylococcus aureus felt to be consistent with infected seroma seeded from bacteremia as opposed to primary abscess       RECOMMENDATION:   · Will plan a total of 4 weeks therapy with cefazolin 2 g IV every 12 given the fact she has had recurrent methicillin susceptible Staphylococcus aureus bacteremia.  Today is day #20 of 28  · Dressing changes for hip per Dr. Turner  · Continue physical therapy  · Await  definitive disposition plans    Dr Ohara available over weekend if needed      Maggie Bang MD  11/12/21  10:49 CST

## 2021-11-12 NOTE — PLAN OF CARE
Goal Outcome Evaluation:  Plan of Care Reviewed With: patient        Progress: no change  Outcome Summary: A&Ox4. C/o pain. Prn Tylenol given with good relief. Reports n/t. BLE remain very weak. PPP. KINA giordano drsg changed this shift, currently CDI. Right hip drsg remains CDI. . Room air. Refusing SCD. Turning when pt allows. Prevalon boots in place. Bladder scan q4. In/out cath x1 so far this shift. Pt planning to d/c to [facility] today via EMS. Call light within reach. Safety maintained.     Report called to SARAH Arredondo. EMS called for transport.

## 2021-11-12 NOTE — CASE MANAGEMENT/SOCIAL WORK
Continued Stay Note   [facility]     Patient Name: Tawny Shin  MRN: [id number]  Today's Date: 11/12/2021    Admit Date: 11/4/2021     Discharge Plan     Row Name 11/12/21 1301       Plan    Plan Spoke with patient/sister regarding dc plans. Patient wants to accept bed at [facility], [place] . Would like to stay in hospital until Monday if possible. MD notified and will speak with patient about going to SNF today instead. MEGA updated and notifying Darline with [facility]. Day 20 of total 28 days needed for anitbx as described in Dr Yi's progress notes.               Discharge Codes    No documentation.                     Belinda Campos RN

## 2021-11-12 NOTE — PROGRESS NOTES
NEUROSURGERY DAILY PROGRESS NOTE    ASSESSMENT:   Tawny Shin is a 66 y.o. with a significant medical history of prior L3-5 posterior lumbar fusion (Dr. Oropeza 2018), MSSA osteomyelitis of right femur (7/6/2021), C6 corpectomy (3/3/2021), L1-2 osteomyelitis/discitis requiring L1 microdiscectomy (3/23/2021), and T12 kyphoplasty (10/26/2021), rheumatoid arthritis on disease modifying agents, osteoporosis, Sjogren's disease, MI, CAD with stenting, DVT right lower extremity currently on Xarelto and ASA (as of 11/4/2021), hypertension, COPD, hyperlipidemia, hypothyroidism, and she is overweight.  She presents from rehab with L1-3 numbness and pain and worsening proximal leg weakness over last 4-5 days.  Physical exam findings of bright and awake, oriented x3, chronic bilateral  strength weakness, worsening left and right IP (1/5) and quadricep weakness (3/5), BLE (4-/5), gross hyperreflexia and bilateral Babinski's.  Her imaging shows anterior epidural hematoma associated with her T12 compression fracture.      Past Medical History:   Diagnosis Date   • Age-related osteoporosis with current pathological fracture 5/27/2020   • Arthritis    • Asthma    • Bilateral bunions 12/23/2020   • Cancer (HCC)    • Cardiac pacemaker syndrome 12/23/2020    Overview:  - heart block - implanted 11/16   • Charcot's joint of foot, left 12/23/2020   • Chronic deep vein thrombosis (DVT) of right lower extremity (HCC) 6/23/2021   • Chronic pain syndrome 6/22/2021   • Chronic sinusitis    • COPD (chronic obstructive pulmonary disease) ([facility])    • Coronary artery disease    • 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   • Elevated cholesterol    • Eustachian tube dysfunction    • Heart disease    • Herpes simplex    • History of transfusion    • Hyperlipidemia    • Hypertension    • Hypothyroidism 12/23/2020   • Intrinsic asthma 12/23/2020   • Knee dislocation    • Labral tear of right hip joint    • Laryngitis sicca    • Laryngitis,  "chronic    • Left carotid bruit 3/9/2016   • MI (myocardial infarction) ([facility])    • Myalgia due to statin 6/25/2019   • Open wound of right hip 9/14/2021   • Osteomyelitis of right femur ([facility]) 7/6/2021   • Otorrhea    • Pacemaker 11/17/2016   • Primary osteoarthritis of left knee 12/23/2020   • Psoriasis vulgaris 12/23/2020   • S/P coronary artery stent placement 3/9/2016   • Sensorineural hearing loss    • Seropositive rheumatoid arthritis of multiple sites ([facility]) 12/27/2019    Overview:  -myochrysine '93-'96 -methotrexate '96--->11/98;r/s  restarted 2/99--> 8/14 (anemia) -sulfasalazine- not effective -penicillamine 6/98-->10/98; no effect -leflunomide 11/98--> - Humira '13-->didn't take - Enbrel 12/14-->3/15- no effect!   Last Assessment & Plan:  - \"aching all over\" because she had to be off her anti-rheumatic drugs for 2 weeks in preparation for her R knee surgery - he   • Sick sinus syndrome ([facility]) 12/27/2019   • Sjogren's disease ([facility])    • Spondylolisthesis of lumbar region 1/17/2018   • Syncope, recurrent 2/8/2021     Active Hospital Problems    Diagnosis    • Severe malnutrition ([facility])    • Epidural hematoma ([facility])      HPI: Appears to be resting comfortably.  Continues to complain of back and bilateral hip and leg pain.  No acute events overnight.    PLAN:   Neuro: Exam unchanged, bilateral lower extremity weakness    T12-L1/2 epidural hematoma from T12 fracure   Continue neurochecks every 4 hours   Plan for DC to rehab today    CV: No acute issues.  VS WNL   Xarelto stopped   On ASA 81mg and Heparin at this time.   Pulm: No acute issues.  Maintaining O2 sat.  : I/O Cath PRN.  Bladder scans Q4 hrs.   FEN: Severe Malnutrition.  Prealb 13.  Appreciate nutrition.   GI: No acute issues.  ORTHO: Plastics consulted.  Appreciate assistance  ID: WBC normal.  CRP down to 5.15   Continue Ancef per ID for a total of 4 week; day #20 of 28   Heme:  DVT prophylaxis with SCDs.  Hold Xarelto and begin aspirin 81mg.    Pain: " "Waxes and wanes.  Dispo: PT/OT   DC pending inpatient placement acceptance.    CHIEF COMPLAINT:   L1-3 numbness and pain and worsening proximal leg weakness over last 4-5 days    Subjective  Stable.  Doing well    Temp:  [97.6 °F (36.4 °C)-97.9 °F (36.6 °C)] 97.9 °F (36.6 °C)  Heart Rate:  [63-72] 71  Resp:  [16] 16  BP: (128-176)/(57-84) 176/64    Objective:  General Appearance:  In no acute distress.    Vital signs: (most recent): Blood pressure 176/64, pulse 71, temperature 97.9 °F (36.6 °C), temperature source Axillary, resp. rate 16, height 167.6 cm (66\"), weight 71.6 kg (157 lb 14.4 oz), SpO2 95 %, not currently breastfeeding.      Neurologic Exam     Mental Status   Oriented to person, place, and time.   Attention: normal. Concentration: normal.   Speech: speech is normal   Level of consciousness: alert    Bright and awake.  Oriented x3.  Follows commands without prompting.     Cranial Nerves     CN II   Visual fields full to confrontation.     CN III, IV, VI   Pupils are equal, round, and reactive to light.  Extraocular motions are normal.     CN V   Facial sensation intact.     CN VII   Facial expression full, symmetric.     CN VIII   CN VIII normal.     CN IX, X   CN IX normal.     CN XI   CN XI normal.     Motor Exam   Right arm tone: normal  Left arm tone: normal  Right leg tone: normal  Left leg tone: normal    Strength   Right deltoid: 5/5  Left deltoid: 5/5  Right biceps: 5/5  Left biceps: 5/5  Right triceps: 4/5  Left triceps: 4/5  Right wrist extension: 5/5  Left wrist extension: 5/5  Right interossei: 4/5  Left interossei: 4/5  Right iliopsoas: 1/5  Left iliopsoas: 2/5  Right quadriceps: 1/5  Left quadriceps: 2/5  Right anterior tibial: 2/5  Left anterior tibial: 4/5  Right gastroc: 2/5  Left gastroc: 4/5       Sensory Exam   Right arm light touch: normal  Left arm light touch: normal  Right leg light touch: normal  Left leg light touch: normal  Sensory deficit distribution on right: L1  Sensory " deficit distribution on left: L1    Gait, Coordination, and Reflexes     Tremor   Resting tremor: absent  Intention tremor: absent  Action tremor: absent    Drains:   Urethral Catheter (Active)   Daily Indications Chronic Urinary Retention related to Obstructive, Infectious/Inflammatory, Neurologic or Traumatic Causes 11/04/21 2111   Site Assessment Clean; Skin intact 11/04/21 2111   Collection Container Standard drainage bag 11/04/21 2111   Securement Method Securing device 11/04/21 2111   Catheter care complete Yes 11/05/21 0600   Output (mL) 450 mL 11/05/21 0600       [REMOVED] Closed/Suction Drain Right;Anterior Thigh Bulb 15 Fr. (Removed)       [REMOVED] Urethral Catheter Silicone 16 Fr. (Removed)       [REMOVED] External Urinary Catheter (Removed)       [REMOVED] External Urinary Catheter (Removed)   Site Assessment Clean 10/24/21 0919   Application/Removal external catheter applied 10/24/21 0919   Collection Container Wall suction 10/24/21 0919   Securement Method Securing device 10/24/21 0919   Output (mL) 350 mL 10/24/21 1841       [REMOVED] External Urinary Catheter (Removed)   Site Assessment Clean; Skin intact 10/27/21 2013   Application/Removal external catheter changed 10/28/21 1025   Collection Container Wall suction 10/28/21 1025   Wall suction (mmHG) 80 mmHG 10/28/21 1025   Securement Method Securing device 10/28/21 1025   Catheter care complete Yes 10/27/21 0815   Output (mL) 400 mL 10/28/21 0300     Imaging Results (Last 24 Hours)     ** No results found for the last 24 hours. **        Lab Results (last 24 hours)     ** No results found for the last 24 hours. **        [id number]  Taiwo Prado, APRN

## 2021-11-12 NOTE — DISCHARGE SUMMARY
DISCHARGE SUMMARY FROM HOSPITAL     Date of Discharge:  11/12/2021    Presenting Diagnosis/History of Present Illness  Epidural hematoma (HCC) [S06.4X9A]    Medical History   Patient Active Problem List   Diagnosis   • T12 compression fracture, initial encounter (HCC)   • Chronic embolism and thrombosis of unspecified deep veins of left lower extremity (HCC)   • Urinary tract infection without hematuria   • Acute bilateral thoracic back pain   • Chronic anticoagulation   • Hypokalemia   • Transaminitis   • Iron deficiency anemia   • Osteoporosis   • Bacteremia   • Moderate malnutrition (CMS/HCC)   • Epidural hematoma (HCC)   • Severe malnutrition (HCC)     Discharge Diagnosis/ Active Hospital Problems:   Active Hospital Problems    Diagnosis    • Severe malnutrition (HCC)    • Epidural hematoma (HCC)      Hospital Course  Patient is a 66 y.o. female presented with a significant medical history of prior L3-5 posterior lumbar fusion (Dr. Oropeza 2018), MSSA osteomyelitis of right femur (7/6/2021), C6 corpectomy (3/3/2021), L1-2 osteomyelitis/discitis requiring L1 microdiscectomy (3/23/2021), and T12 kyphoplasty (10/26/2021), rheumatoid arthritis on disease modifying agents, osteoporosis, Sjogren's disease, MI, CAD with stenting, DVT right lower extremity currently on Xarelto and ASA (as of 11/4/2021), hypertension, COPD, hyperlipidemia, hypothyroidism, and she is overweight.  She presents from rehab with L1-3 numbness and pain and worsening proximal leg weakness over last 4-5 days.  Physical exam findings of bright and awake, oriented x3, chronic bilateral  strength weakness, worsening left and right IP (1/5) and quadricep weakness (3/5), BLE (4-/5), gross hyperreflexia and bilateral Babinski's.  Her imaging shows anterior epidural hematoma associated with her T12 compression fracture.         was admitted to the hospital for for close neurologic monitoring, airway observation, and for pain control.  Since  being admitted her neurological exam has remained unchanged.  She has been able to tolerate oral diet, oral pain medication, and void.  She has been evaluated by infectious disease, Dr. 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  Today is day #20 of 28.  She is also been evaluated by Dr. Turner whom recommends continuing daily right hip wound dressing changes.  She has been evaluated by physical therapy and occupational therapy and deemed safe for discharge to [facility] to continue physical and/or occupational therapy.  Additional instructions provided.    Procedures Performed     Consults:   Consults     Date and Time Order Name Status Description    11/9/2021  5:17 PM Inpatient Infectious Diseases Consult Completed     11/7/2021  4:32 AM Inpatient Wound Care Provider Consult Completed     11/4/2021  5:04 PM Inpatient Plastic Surgery Consult Completed     10/22/2021  5:06 PM Inpatient Plastic Surgery Consult Completed     10/22/2021  2:06 PM Inpatient Infectious Diseases Consult Completed     10/21/2021  5:07 PM Inpatient Neurosurgery Consult Completed         Pertinent Test Results:   CT Thoracic Spine Without Contrast    Result Date: 10/21/2021  Acute versus subacute superior endplate compression fracture of T12, with erosive changes and gas within the T11-12 disc interval. The fracture involves the anterior and middle columns and there slight retropulsion of the posterior superior cortex of T12, causing slight spinal stenosis. There is approximately 25% loss of T12 vertebral body height. With the gas and erosive changes, the possibility of discitis/osteomyelitis is not excluded. Consider follow-up with MRI of the thoracic spine with and without contrast.    This report was finalized on 10/21/2021 10:52 by Dr. Jer Townsend MD.    CT Lumbar Spine  Without Contrast    Result Date: 10/21/2021  1. Severe osteoporosis. 2. Superior endplate compression fracture deformity of T12 with retropulsion with an estimated 20% compromise the bony spinal canal. There is an estimated 25% loss of vertebral body height maximally. The CT appearance suggests subacute injury, acute injury not excluded. 3. Right laminectomy defect at L1-L2 with marked progression of sclerosis and endplate irregularities and L1-L2 with some minimal vacuum phenomenon. Suspect progression of degenerative changes. Discitis and osteomyelitis may be considered in the appropriate clinical setting. Mild posterior listhesis of L1 on L2. Bilateral foraminal narrowing. 4. Postsurgical changes L3-L4 and L4-L5 without hardware complications. This report was finalized on 10/21/2021 11:00 by Dr. Sterling Elizalde MD.    MRI Thoracic Spine With & Without Contrast    Result Date: 10/21/2021  1. Superior plate compression fracture at T12 with approximately 25% loss of vertebral body height, with apparent involvement of the anterior middle columns, there is only mild marrow edema, which suggests that the fracture may be subacute. There slight retropulsion of the posterior cortex of T12, with mild/moderate spinal stenosis but no cord compression. Postcontrast images demonstrates no evidence of discitis/osteophyte minus complex at T11-12. There is probable mild reactive epidural enhancement at the level of the fracture as described above. No epidural hematoma is identified. This report was finalized on 10/21/2021 15:18 by Dr. Jer Townsend MD.    US Guided Abscess Drain Drainage or Aspiration    Result Date: 10/23/2021   Ultrasound-guided aspiration of RIGHT hip soft tissue collection, with collection of 3 cc serosanguineous fluid, which was sent for laboratory analysis. There was limited visualization with ultrasound given extensive soft tissue edema and postsurgical scarring. If concern for infected fluid collection  remains high, consider repeat aspiration utilizing CT guidance. This report was finalized on 10/23/2021 14:44 by Dr. Wesley Mcfadden MD.    CT Lower Extremity Right With & Without Contrast    Result Date: 10/22/2021  1. Fluid collection appreciated along the posterior lateral aspect of the right greater trochanter suspicious for abscess with induration and inflammatory changes in the overlying subcutaneous soft tissues with skin thickening as described above in detail. 2. No acute bony abnormality. This report was finalized on 10/22/2021 15:59 by Dr. Sterling Elizalde MD.    MRI Lumbar Spine With & Without Contrast    Result Date: 10/21/2021  1. Subacute T12 superior endplate fracture with decrease in bone marrow edema compared to the 5/26/2021 examination.. No progression of compression deformity. 2. Resolved acute osteomyelitis and discitis at L1-L2 compared to the previous exam. Bone marrow edema in the L1 and L2 vertebral body is no longer observed. The abscess in the laminectomy defect has resolved. 3. No acute osseous abnormality. This report was finalized on 10/21/2021 15:59 by Dr. Sterling Elizalde MD.    CBC:   Results from last 7 days   Lab Units 11/11/21  0603   WBC 10*3/mm3 4.27   HEMOGLOBIN g/dL 8.0*   HEMATOCRIT % 25.6*   PLATELETS 10*3/mm3 205     BMP:  Results from last 7 days   Lab Units 11/11/21  0603   SODIUM mmol/L 135*   POTASSIUM mmol/L 5.2   CHLORIDE mmol/L 99   CO2 mmol/L 27.0   BUN mg/dL 17   CREATININE mg/dL 0.60   GLUCOSE mg/dL 82   CALCIUM mg/dL 9.3   ALT (SGPT) U/L <5     Culture Results: No results found for: BLOODCX, URINECX, WOUNDCX, MRSACX, RESPCX, STOOLCX    Condition on Discharge: Stable    Vital Signs  Temp:  [97.6 °F (36.4 °C)-97.9 °F (36.6 °C)] 97.9 °F (36.6 °C)  Heart Rate:  [63-72] 71  Resp:  [16] 16  BP: (128-176)/(57-84) 176/64    Physical Exam:   Physical Exam  Vitals and nursing note reviewed.   Constitutional:       General: She is not in acute distress.     Appearance: Normal  appearance. She is well-developed, well-groomed and overweight. She is not ill-appearing, toxic-appearing or diaphoretic.          Comments: BMI 25.49   HENT:      Head: Normocephalic and atraumatic.      Right Ear: Hearing normal.      Left Ear: Hearing normal.   Eyes:      Extraocular Movements: EOM normal.      Conjunctiva/sclera: Conjunctivae normal.      Pupils: Pupils are equal, round, and reactive to light.   Neck:      Trachea: Trachea normal.   Cardiovascular:      Rate and Rhythm: Normal rate and regular rhythm.   Pulmonary:      Effort: Pulmonary effort is normal. No tachypnea, bradypnea, accessory muscle usage or respiratory distress.   Abdominal:      Palpations: Abdomen is soft.   Musculoskeletal:      Cervical back: Full passive range of motion without pain and neck supple.   Skin:     General: Skin is warm and dry.   Neurological:      Mental Status: She is alert and oriented to person, place, and time.      GCS: GCS eye subscore is 4. GCS verbal subscore is 5. GCS motor subscore is 6.   Psychiatric:         Speech: Speech normal.         Behavior: Behavior normal. Behavior is cooperative.          Neurologic Exam     Mental Status   Oriented to person, place, and time.   Attention: normal. Concentration: normal.   Speech: speech is normal   Level of consciousness: alert    Bright and awake.  Oriented x3.  Follows commands without prompting.     Cranial Nerves     CN II   Visual fields full to confrontation.     CN III, IV, VI   Pupils are equal, round, and reactive to light.  Extraocular motions are normal.     CN V   Facial sensation intact.     CN VII   Facial expression full, symmetric.     CN VIII   CN VIII normal.     CN IX, X   CN IX normal.     CN XI   CN XI normal.     Motor Exam   Right arm tone: normal  Left arm tone: normal  Right leg tone: normal  Left leg tone: normal    Strength   Right deltoid: 5/5  Left deltoid: 5/5  Right biceps: 5/5  Left biceps: 5/5  Right triceps: 4/5  Left  triceps: 4/5  Right wrist extension: 5/5  Left wrist extension: 5/5  Right interossei: 4/5  Left interossei: 4/5  Right iliopsoas: 1/5  Left iliopsoas: 2/5  Right quadriceps: 1/5  Left quadriceps: 2/5  Right anterior tibial: 2/5  Left anterior tibial: 4/5  Right gastroc: 2/5  Left gastroc: 4/5       Sensory Exam   Right arm light touch: normal  Left arm light touch: normal  Right leg light touch: normal  Left leg light touch: normal  Sensory deficit distribution on right: L1  Sensory deficit distribution on left: L1    Gait, Coordination, and Reflexes     Tremor   Resting tremor: absent  Intention tremor: absent  Action tremor: absent    Discharge Disposition  Skilled Nursing Facility ([place] - External)    Discharge Medications     Discharge Medications      New Medications      Instructions Start Date   DULoxetine 30 MG capsule  Commonly known as: CYMBALTA   30 mg, Oral, Daily   Start Date: November 13, 2021     DULoxetine 60 MG capsule  Commonly known as: CYMBALTA   60 mg, Oral, Daily   Start Date: November 17, 2021     fluconazole 150 MG tablet  Commonly known as: DIFLUCAN   150 mg, Oral, Every 72 Hours   Start Date: November 14, 2021     heparin (porcine) 5000 UNIT/ML injection   5,000 Units, Subcutaneous, Every 12 Hours Scheduled      hydrocortisone-bacitracin-zinc oxide-nystatin  Commonly known as: MAGIC BARRIER   1 application, Topical, As Needed      ondansetron 4 MG tablet  Commonly known as: ZOFRAN   4 mg, Oral, Every 6 Hours PRN      sennosides-docusate 8.6-50 MG per tablet  Commonly known as: PERICOLACE   2 tablets, Oral, 2 Times Daily         Changes to Medications      Instructions Start Date   polyethylene glycol 17 g packet  Commonly known as: MIRALAX  What changed: Another medication with the same name was added. Make sure you understand how and when to take each.   17 g, Oral, Daily      polyethylene glycol 17 g packet  Commonly known as: MIRALAX  What changed: You were already taking a medication  with the same name, and this prescription was added. Make sure you understand how and when to take each.   17 g, Oral, Daily PRN      traMADol 50 MG tablet  Commonly known as: ULTRAM  What changed: Another medication with the same name was added. Make sure you understand how and when to take each.   50 mg, Oral, 2 Times Daily      traMADol 50 MG tablet  Commonly known as: ULTRAM  What changed: You were already taking a medication with the same name, and this prescription was added. Make sure you understand how and when to take each.   100 mg, Oral, 3 Times Daily         Continue These Medications      Instructions Start Date   acetaminophen 325 MG tablet  Commonly known as: TYLENOL   650 mg, Oral, Every 6 Hours PRN      apixaban 5 MG tablet tablet  Commonly known as: ELIQUIS   5 mg, Oral, 2 Times Daily      aspirin 81 MG EC tablet   81 mg, Oral, Daily      carvedilol 25 MG tablet  Commonly known as: COREG   25 mg, Oral, 2 Times Daily With Meals      ceFAZolin in 0.9% normal saline 2 GM/ 100 mL solution IVPB  Commonly known as: ANCEF   2 g, Intravenous, Every 8 Hours      Diclofenac Sodium 1 % gel gel  Commonly known as: VOLTAREN   4 g, Topical, 4 Times Daily PRN      docusate sodium 100 MG capsule   100 mg, Oral, 2 Times Daily      famotidine 40 MG tablet  Commonly known as: PEPCID   40 mg, Oral, Daily      ferrous sulfate 325 (65 Fe) MG tablet   325 mg, Oral, Daily With Breakfast      fluticasone 50 MCG/ACT nasal spray  Commonly known as: FLONASE   1 spray, Nasal, Daily PRN      folic acid 1 MG tablet  Commonly known as: FOLVITE   1 mg, Oral, Daily      furosemide 40 MG tablet  Commonly known as: LASIX   40 mg, Oral, Daily      isosorbide mononitrate 60 MG 24 hr tablet  Commonly known as: IMDUR   60 mg, Oral, 2 Times Daily      levothyroxine 75 MCG tablet  Commonly known as: SYNTHROID, LEVOTHROID   75 mcg, Oral, Daily      lidocaine 5 %  Commonly known as: LIDODERM   1 patch, Transdermal, Daily PRN, Remove &  Discard patch within 12 hours or as directed by MD      magnesium oxide 400 MG tablet  Commonly known as: MAG-OX   400 mg, Oral, Daily      Movantik 25 MG tablet  Generic drug: Naloxegol Oxalate   25 mg, Oral, Every Morning      multivitamin with minerals tablet tablet   1 tablet, Oral, Daily      nitroglycerin 0.4 MG SL tablet  Commonly known as: Nitrostat   0.4 mg, Sublingual, Every 5 Minutes PRN, Take no more than 3 doses in 15 minutes.      pantoprazole 40 MG EC tablet  Commonly known as: PROTONIX   40 mg, Oral, Daily      potassium chloride 20 MEQ packet  Commonly known as: KLOR-CON   20 mEq, Oral, Daily      saccharomyces boulardii 250 MG capsule  Commonly known as: Florastor   250 mg, Oral, Daily      salsalate 500 MG tablet  Commonly known as: DISALCID   500 mg, Oral, Daily, Take 5 tablets by mouth Monday, Wednesdays and Fridays, and 4 tablets on Tuesdays, Thursday and Sundays       sucralfate 1 g tablet  Commonly known as: CARAFATE   1 g, Oral, 4 Times Daily Before Meals & Nightly      thiamine1 250 MG tablet  Commonly known as: VITAMIN B1   250 mg, Oral, Daily      valACYclovir 500 MG tablet  Commonly known as: VALTREX   500 mg, Oral, Every 24 Hours Scheduled         Stop These Medications    HYDROcodone-acetaminophen 7.5-325 MG per tablet  Commonly known as: NORCO          Discharge Diet:   Diet Instructions     Advance Diet As Tolerated           Activity at Discharge:   Activity Instructions     Activity as Tolerated      Driving Restrictions      Type of Restriction: Driving    Driving Restrictions: No Driving While Taking Narcotics    Gradually Increase Activity Until at Pre-Hospitalization Level      Lifting Restrictions      Type of Restriction: Lifting    Lifting Restrictions: Lifting Restriction (Indicate Limit)    Weight Limit (Pounds): 8    Length of Lifting Restriction: 2-3 WEEKS         Follow-up Appointments  Future Appointments   Date Time Provider Department Center   1/13/2022 11:15 AM  [facility]   1/17/2022 10:15 AM Davon Urrutia MD [facility]   2/7/2022  1:45 PM Bandar Shea MD [facility]     Additional Instructions for the Follow-ups that You Need to Schedule     Call MD With Problems / Concerns   As directed      Instructions: CALL WITH ANY NEW OR ADDITIONAL CONCERNS.    Order Comments: Instructions: CALL WITH ANY NEW OR ADDITIONAL CONCERNS.          Discharge Follow-up with Specified Provider: Dr. Shea; 2 Months   As directed      To: Dr. Shea    Follow Up: 2 Months    Follow Up Details: 6-8 WEEKS             Test Results Pending at Discharge  None     Taiwo Prado, APRN  11/12/21  13:40 CST

## 2021-11-12 NOTE — CASE MANAGEMENT/SOCIAL WORK
Continued Stay Note   [facility]     Patient Name: Tawny Shin  MRN: [id number]  Today's Date: 11/12/2021    Admit Date: 11/4/2021     Discharge Plan     Row Name 11/12/21 0952       Plan    Plan Comments SW received call from magaly Ramsey for [facility], who states that she has spoken with pt and offered a bed for private pay until days are obtained for medicare to pay again. Pt states that she wants to think about this. SW will follow               Discharge Codes    No documentation.                     Bianca Barragan

## 2021-11-12 NOTE — PLAN OF CARE
Goal Outcome Evaluation:  Plan of Care Reviewed With: patient        Progress: no change  Outcome Summary: PT tx complete: pt AOx4. pt needed assistance with cleaning as she had a bowel movement. pt was able to roll R and L with SBA and verbal cues. pt was educated on HEP to address ROM and strength deficits. pt performed 12 exercises and tolerated well with one rest break. pt was reminded to breathe during the exercises and to take her time performing them to make sure she maintains proper form. pt was also educated that she should feel sore the next day and not in excruciating pain. pt to continue with skilled PT for strength, ROM, and transfer training. D/C recommendation inpatient rehab vs SNF.

## 2021-11-12 NOTE — THERAPY TREATMENT NOTE
Patient Name: Tawny Shin  : 1955    MRN: [id number]                              Today's Date: 2021       Admit Date: 2021    Visit Dx:     ICD-10-CM ICD-9-CM   1. Other chronic pain  G89.29 338.29   2. Impaired mobility  Z74.09 799.89   3. Impaired mobility and ADLs  Z74.09 V49.89    Z78.9    4. Dysphagia, unspecified type  R13.10 787.20   5. T12 compression fracture, initial encounter ([facility])  S22.080A 805.2     Patient Active Problem List   Diagnosis   • T12 compression fracture, initial encounter ([facility])   • Chronic embolism and thrombosis of unspecified deep veins of left lower extremity ([facility])   • Urinary tract infection without hematuria   • Acute bilateral thoracic back pain   • Chronic anticoagulation   • Hypokalemia   • Transaminitis   • Iron deficiency anemia   • Osteoporosis   • Bacteremia   • Moderate malnutrition (CMS/[facility])   • Epidural hematoma ([facility])   • Severe malnutrition ([facility])     Past Medical History:   Diagnosis Date   • Age-related osteoporosis with current pathological fracture 2020   • Arthritis    • Asthma    • Bilateral bunions 2020   • Cancer ([facility])    • Cardiac pacemaker syndrome 2020    Overview:  - heart block - implanted    • Charcot's joint of foot, left 2020   • Chronic deep vein thrombosis (DVT) of right lower extremity ([facility]) 2021   • Chronic pain syndrome 2021   • Chronic sinusitis    • COPD (chronic obstructive pulmonary disease) ([facility])    • Coronary artery disease    • 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   • Elevated cholesterol    • Eustachian tube dysfunction    • Heart disease    • Herpes simplex    • History of transfusion    • Hyperlipidemia    • Hypertension    • Hypothyroidism 2020   • Intrinsic asthma 2020   • Knee dislocation    • Labral tear of right hip joint    • Laryngitis sicca    • Laryngitis, chronic    • Left carotid bruit 3/9/2016   • MI (myocardial infarction) ([facility])    • Myalgia due to  "statin 6/25/2019   • Open wound of right hip 9/14/2021   • Osteomyelitis of right femur (HCC) 7/6/2021   • Otorrhea    • Pacemaker 11/17/2016   • Primary osteoarthritis of left knee 12/23/2020   • Psoriasis vulgaris 12/23/2020   • S/P coronary artery stent placement 3/9/2016   • Sensorineural hearing loss    • Seropositive rheumatoid arthritis of multiple sites ([facility]) 12/27/2019    Overview:  -myochrysine '93-'96 -methotrexate '96--->11/98;r/s  restarted 2/99--> 8/14 (anemia) -sulfasalazine- not effective -penicillamine 6/98-->10/98; no effect -leflunomide 11/98--> - Humira '13-->didn't take - Enbrel 12/14-->3/15- no effect!   Last Assessment & Plan:  - \"aching all over\" because she had to be off her anti-rheumatic drugs for 2 weeks in preparation for her R knee surgery - he   • Sick sinus syndrome ([facility]) 12/27/2019   • Sjogren's disease ([facility])    • Spondylolisthesis of lumbar region 1/17/2018   • Syncope, recurrent 2/8/2021     Past Surgical History:   Procedure Laterality Date   • A-V CARDIAC PACEMAKER INSERTION  2016   • ATRIAL CARDIAC PACEMAKER INSERTION     • CARDIAC CATHETERIZATION     • CATARACT EXTRACTION     • CERVICAL CORPECTOMY N/A 3/3/2021    Procedure: CERVICAL 6 CORPECTOMY WITH TITANIUM CAGE WITH NEURO MONITORING;  Surgeon: Bandar Shea MD;  Location:  [facility] OR;  Service: Neurosurgery;  Laterality: N/A;   • COLONOSCOPY  11/08/2011    One fold in the ascending colon which showed ulcer otherwise normal exam   • COLONOSCOPY  11/12/2004    Normal exam repeat in five years   • CORONARY ANGIOPLASTY WITH STENT PLACEMENT      X 2; 2013 & 2014   • ENDOSCOPY  07/10/2014    Normal exam   • FLAP LEG Right 9/14/2021    Procedure: RIGHT GLUTEAL FASCIOCUTANEOUS ADVANCEMENT FLAP AND RIGHT TENSOR FASCIAL JESSICA FLAP;  Surgeon: Amadeo Turner MD;  Location:  [facility] OR;  Service: Plastics;  Laterality: Right;   • HIP ABDUCTION TENOTOMY BILATERAL Right 1/14/2021    Procedure: RIGHT HIP GLUTEUS MEDLUS / MINIMUS " REPAIR, POSSIBLE ACHILLES ALLOGRAFT;  Surgeon: Nino Carlson MD;  Location:  [facility] OR;  Service: Orthopedics;  Laterality: Right;   • INCISION AND DRAINAGE HIP Right 2/9/2021    Procedure: HIP INCISION AND DRAINAGE;  Surgeon: Nino Carlson MD;  Location:  [facility] OR;  Service: Orthopedics;  Laterality: Right;   • INCISION AND DRAINAGE LEG Right 10/24/2021    Procedure: INCISION AND DRAINAGE LOWER EXTREMITY;  Surgeon: Amadeo Turner MD;  Location:  [facility] OR;  Service: Plastics;  Laterality: Right;   • INCISION AND DRAINAGE OF WOUND Right 7/8/2021    Procedure: INCISION AND DRAINAGE WOUND RIGHT HIP;  Surgeon: James Huntley MD;  Location:  [facility] OR;  Service: Orthopedics;  Laterality: Right;   • JOINT REPLACEMENT     • KYPHOPLASTY WITH BIOPSY Bilateral 10/26/2021    Procedure: THOARCIC 12 KYPHOPLASTY WITH BIOPSY;  Surgeon: Bandar Shea MD;  Location:  [facility] OR;  Service: Neurosurgery;  Laterality: Bilateral;   • LEG DEBRIDEMENT Right 9/14/2021    Procedure: DEBRIDEMENT OF RIGHT HIP WOUND, RIGHT GLUTEAL FASCIOCUTANEOUS ADVANCEMENT FLAP AND RIGHT TENSOR FASCIAL JESSICA FLAP;  Surgeon: Amadeo Turner MD;  Location:  [facility] OR;  Service: Plastics;  Laterality: Right;   • LUMBAR DISCECTOMY Right 3/23/2021    Procedure: LUMBAR DISCECTOMY MICRO, Lumbar 1/2 right;  Surgeon: Bandar Shea MD;  Location: [facility] OR;  Service: Neurosurgery;  Laterality: Right;   • LUMBAR FUSION N/A 1/19/2018    Procedure: L3-4,L4-5 DECOMPRESSION, POSTERIOR SPINAL FUSION WITH INSTRUMENTATION;  Surgeon: Fortino Oropeza MD;  Location: [facility] OR;  Service:    • LUMBAR LAMINECTOMY WITH FUSION Left 1/17/2018    Procedure: LEFT L3-4 L4-5 LATERAL LUMBAR INTERBODY FUSION;  Surgeon: Fortino Oropeza MD;  Location: [facility] OR;  Service:    • MYRINGOTOMY W/ TUBES  09/04/2014    TUBES NO LONGER IN PLACE   • OTHER SURGICAL HISTORY      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 so hardware was removed and spacers were placed   •  REPLACEMENT TOTAL KNEE Right       General Information     Row Name 11/12/21 1105          Physical Therapy Time and Intention    Document Type therapy note (daily note)  -MS (r) NN (t) MS (c)     Mode of Treatment physical therapy  -MS (r) NN (t) MS (c)     Row Name 11/12/21 1105          General Information    Patient Profile Reviewed yes  -MS (r) NN (t) MS (c)     Existing Precautions/Restrictions fall; spinal  -MS (r) NN (t) MS (c)     Row Name 11/12/21 1105          Cognition    Orientation Status (Cognition) oriented x 4  -MS (r) NN (t) MS (c)           User Key  (r) = Recorded By, (t) = Taken By, (c) = Cosigned By    Initials Name Provider Type    MS Nicole Olson, PT, DPT, NCS Physical Therapist    NN Ana Bender, PT Student PT Student               Mobility     Row Name 11/12/21 1105          Bed Mobility    Bed Mobility rolling left; rolling right; scooting/bridging  -MS (r) NN (t) MS (c)     Rolling Left [place] (Bed Mobility) standby assist; verbal cues  -MS (r) NN (t) MS (c)     Rolling Right [place] (Bed Mobility) standby assist; verbal cues  -MS (r) NN (t) MS (c)     Scooting/Bridging [place] (Bed Mobility) maximum assist (25% patient effort); 2 person assist  -MS (r) NN (t) MS (c)     Assistive Device (Bed Mobility) bed rails; draw sheet; head of bed elevated  -MS (r) NN (t) MS (c)           User Key  (r) = Recorded By, (t) = Taken By, (c) = Cosigned By    Initials Name Provider Type    MS Nicole Olson, PT, DPT, NCS Physical Therapist    NN Ana Bender, PT Student PT Student               Obj/Interventions     Row Name 11/12/21 1105          Motor Skills    Therapeutic Exercise shoulder; hip; knee; ankle  Pt was assigned HEP with 12 exercises adressing ROM & Strength deficits in shoulder, hip, knee, and ankle  -MS (r) NN (t) MS (c)     Row Name 11/12/21 1105          Shoulder (Therapeutic Exercise)    Shoulder (Therapeutic Exercise) AROM (active range of motion)  -MS (r)  NN (t) MS (c)     Shoulder AROM (Therapeutic Exercise) bilateral; flexion; extension; external rotation; internal rotation; horizontal aBduction/aDduction; supine  D1 & D2 PNF Pattern  -MS (r) NN (t) MS (c)     Row Name 11/12/21 1105          Hip (Therapeutic Exercise)    Hip (Therapeutic Exercise) AAROM (active assistive range of motion); isometric exercises  -MS (r) NN (t) MS (c)     Hip AAROM (Therapeutic Exercise) bilateral; flexion; extension; supine  -MS (r) NN (t) MS (c)     Hip Isometrics (Therapeutic Exercise) bilateral; gluteal sets  -MS (r) NN (t) MS (c)     Row Name 11/12/21 1105          Knee (Therapeutic Exercise)    Knee (Therapeutic Exercise) AAROM (active assistive range of motion); isometric exercises  -MS (r) NN (t) MS (c)     Knee AAROM (Therapeutic Exercise) bilateral; flexion; extension; supine  -MS (r) NN (t) MS (c)     Knee Isometrics (Therapeutic Exercise) bilateral; quad sets; supine  -MS (r) NN (t) MS (c)     [facility] Name 11/12/21 1105          Ankle (Therapeutic Exercise)    Ankle (Therapeutic Exercise) AAROM (active assistive range of motion)  -MS (r) NN (t) MS (c)     Ankle AAROM (Therapeutic Exercise) bilateral; dorsiflexion; plantarflexion; supine  -MS (r) NN (t) MS (c)           User Key  (r) = Recorded By, (t) = Taken By, (c) = Cosigned By    Initials Name Provider Type    Nicole Palacios R, PT, DPT, NCS Physical Therapist    NN Ana Bender, PT Student PT Student               Goals/Plan    No documentation.                Clinical Impression     Row Name 11/12/21 1105          Pain    Additional Documentation Pain Scale: Numbers Pre/Post-Treatment (Group)  -MS (r) NN (t) MS (c)     Row Name 11/12/21 1105          Pain Scale: Numbers Pre/Post-Treatment    Pretreatment Pain Rating 0/10 - no pain  -MS (r) NN (t) MS (c)     Posttreatment Pain Rating 0/10 - no pain  -MS (r) NN (t) MS (c)     Pain Intervention(s) Medication (See MAR); Repositioned; Ambulation/increased activity  -MS (r)  NN (t) MS (c)     Row Name 11/12/21 1105          Plan of Care Review    Plan of Care Reviewed With patient  -MS (r) NN (t) MS (c)     Progress no change  -MS (r) NN (t) MS (c)     Outcome Summary PT tx complete: pt AOx4. pt needed assistance with cleaning as she had a bowel movement. pt was able to roll R and L with SBA and verbal cues. pt was educated on HEP to address ROM and strength deficits. pt performed 12 exercises and tolerated well with one rest break. pt was reminded to breathe during the exercises and to take her time performing them to make sure she maintains proper form. pt was also educated that she should feel sore the next day and not in excruciating pain. pt to continue with skilled PT for strength, ROM, and transfer training. D/C recommendation inpatient rehab vs SNF.  -MS (r) NN (t) MS (c)     Row Name 11/12/21 1105          Positioning and Restraints    Pre-Treatment Position in bed  -MS (r) NN (t) MS (c)     Post Treatment Position bed  -MS (r) NN (t) MS (c)     In Bed fowlers; call light within reach; encouraged to call for assist; with family/caregiver  -MS (r) NN (t) MS (c)           User Key  (r) = Recorded By, (t) = Taken By, (c) = Cosigned By    Initials Name Provider Type    Nicole Palacios, PT, DPT, NCS Physical Therapist    NN Ana Bender, PT Student PT Student               Outcome Measures     Row Name 11/12/21 1105          How much help from another person do you currently need...    Turning from your back to your side while in flat bed without using bedrails? 3  -MS (r) NN (t) MS (c)     Moving from lying on back to sitting on the side of a flat bed without bedrails? 3  -MS (r) NN (t) MS (c)     Moving to and from a bed to a chair (including a wheelchair)? 2  -MS (r) NN (t) MS (c)     Standing up from a chair using your arms (e.g., wheelchair, bedside chair)? 1  -MS (r) NN (t) MS (c)     Climbing 3-5 steps with a railing? 1  -MS (r) NN (t) MS (c)     To walk in hospital  room? 1  -MS (r) NN (t) MS (c)     AM-PAC 6 Clicks Score (PT) 11  -MS (r) NN (t)     Row Name 11/12/21 1105          Functional Assessment    Outcome Measure Options AM-PAC 6 Clicks Basic Mobility (PT)  -MS (r) NN (t) MS (c)           User Key  (r) = Recorded By, (t) = Taken By, (c) = Cosigned By    Initials Name Provider Type    Nicole Palacios R, PT, DPT, NCS Physical Therapist    NN Ana Bender, PT Student PT Student                             Physical Therapy Education                 Title: PT OT SLP Therapies (In Progress)     Topic: Physical Therapy (Done)     Point: Mobility training (Done)     Learning Progress Summary           Patient Acceptance, E, VU by  at 11/12/2021 1105    Comment: HEP Exercises and Proper Form, Positioning for Rolling    Acceptance, E, VU by  at 11/11/2021 1440    Comment: positioning for rolling    Acceptance, E, VU by MS at 11/10/2021 1452    Comment: need for rest breaks between exercise to allow for better movement    Acceptance, E, NR by MS at 11/9/2021 1156    Comment: slide board transfer    Acceptance, E, VU,NR by  at 11/4/2021 1509    Comment: Educated pt on spinal precautions, proper body mechanics during bed mobility transfers, POC, and d/c.                   Point: Home exercise program (Done)     Learning Progress Summary           Patient Acceptance, E, VU by NN at 11/12/2021 1105    Comment: HEP Exercises and Proper Form, Positioning for Rolling                   Point: Body mechanics (Done)     Learning Progress Summary           Patient Acceptance, E, VU by  at 11/12/2021 1105    Comment: HEP Exercises and Proper Form, Positioning for Rolling    Acceptance, E, VU by  at 11/11/2021 1440    Comment: positioning for rolling    Acceptance, E, VU,NR by  at 11/4/2021 1509    Comment: Educated pt on spinal precautions, proper body mechanics during bed mobility transfers, POC, and d/c.                   Point: Precautions (Done)     Learning Progress  Summary           Patient Acceptance, E, VU by  at 11/12/2021 1105    Comment: HEP Exercises and Proper Form, Positioning for Rolling    Acceptance, E, VU by  at 11/11/2021 1440    Comment: positioning for rolling    Acceptance, E,TB, VU by  at 11/7/2021 1027    Comment: positioning    Acceptance, E, VU,NR by  at 11/4/2021 1509    Comment: Educated pt on spinal precautions, proper body mechanics during bed mobility transfers, POC, and d/c.                               User Key     Initials Effective Dates Name Provider Type Discipline     06/16/21 -  Olga Chambers, PTA Physical Therapy Assistant PT    MS 06/19/18 -  Nicole Olson, PT, DPT, NCS Physical Therapist PT     08/18/21 -  Ana Bender, PT Student PT Student PT     09/07/21 -  Juliana Angela, PT Student PT Student PT              PT Recommendation and Plan     Plan of Care Reviewed With: patient  Progress: no change  Outcome Summary: PT tx complete: pt AOx4. pt needed assistance with cleaning as she had a bowel movement. pt was able to roll R and L with SBA and verbal cues. pt was educated on HEP to address ROM and strength deficits. pt performed 12 exercises and tolerated well with one rest break. pt was reminded to breathe during the exercises and to take her time performing them to make sure she maintains proper form. pt was also educated that she should feel sore the next day and not in excruciating pain. pt to continue with skilled PT for strength, ROM, and transfer training. D/C recommendation inpatient rehab vs SNF.     Time Calculation:    PT Charges     Row Name 11/12/21 1450             Time Calculation    Start Time 1105  -MS (r) NN (t) MS (c)      Stop Time 1200  -MS (r) NN (t) MS (c)      Time Calculation (min) 55 min  -MS (r) NN (t)      PT Received On 11/12/21  -MS (r) NN (t) MS (c)              Time Calculation- PT    Total Timed Code Minutes- PT 55 minute(s)  -MS (r) NN (t) MS (c)              Timed Charges    93542  - PT Therapeutic Exercise Minutes 45  -MS (r) NN (t) MS (c)      15997 - PT Therapeutic Activity Minutes 10  -MS (r) NN (t) MS (c)              Total Minutes    Timed Charges Total Minutes 55  -MS (r) NN (t)       Total Minutes 55  -MS (r) NN (t)            User Key  (r) = Recorded By, (t) = Taken By, (c) = Cosigned By    Initials Name Provider Type    Nicole Palacios R, PT, DPT, NCS Physical Therapist    Ana Kaminski, PT Student PT Student                  PT G-Codes  Outcome Measure Options: AM-PAC 6 Clicks Basic Mobility (PT)  AM-PAC 6 Clicks Score (PT): 11  AM-PAC 6 Clicks Score (OT): 15    Ana Bender, PT Student  11/12/2021

## 2021-11-13 NOTE — THERAPY DISCHARGE NOTE
Acute Care - Occupational Therapy Discharge Summary  [facility]     Patient Name: Tawny Shin  : 1955  MRN: [id number]    Today's Date: 2021                 Admit Date: 2021        OT Recommendation and Plan    Visit Dx:    ICD-10-CM ICD-9-CM   1. Other chronic pain  G89.29 338.29   2. Impaired mobility  Z74.09 799.89   3. Impaired mobility and ADLs  Z74.09 V49.89    Z78.9    4. Dysphagia, unspecified type  R13.10 787.20   5. T12 compression fracture, initial encounter ([facility])  S22.080A 805.2                OT Rehab Goals     Row Name 21 1400             Transfer Goal 1 (OT)    Activity/Assistive Device (Transfer Goal 1, OT) sit-to-stand/stand-to-sit; bed-to-chair/chair-to-bed; commode, 3-in-1  -CS      [place] Level/Cues Needed (Transfer Goal 1, OT) moderate assist (50-74% patient effort)  -CS      Time Frame (Transfer Goal 1, OT) long term goal (LTG); 10 days  -CS      Progress/Outcome (Transfer Goal 1, OT) goal not met  -CS              Bathing Goal 1 (OT)    Activity/Device (Bathing Goal 1, OT) bathing skills, all  -CS      [place] Level/Cues Needed (Bathing Goal 1, OT) set-up required  -CS      Time Frame (Bathing Goal 1, OT) long term goal (LTG); 10 days  -CS      Progress/Outcomes (Bathing Goal 1, OT) goal not met  -CS              Toileting Goal 1 (OT)    Activity/Device (Toileting Goal 1, OT) toileting skills, all; commode, 3-in-1  -CS      [place] Level/Cues Needed (Toileting Goal 1, OT) moderate assist (50-74% patient effort)  -CS      Time Frame (Toileting Goal 1, OT) long term goal (LTG); 10 days  -CS      Progress/Outcome (Toileting Goal 1, OT) goal not met  -CS              Problem Specific Goal 1 (OT)    Problem Specific Goal 1 (OT) x5 min seated task EOB with </= Jose R for balance to decrease fall risk and increase independence  -CS      Time Frame (Problem Specific Goal 1, OT) long term goal (LTG); 10 days  -CS      Progress/Outcome (Problem Specific Goal 1,  OT) goal not met  -CS            User Key  (r) = Recorded By, (t) = Taken By, (c) = Cosigned By    Initials Name Provider Type Discipline    CS Molly Linder OTR/L, MARI Occupational Therapist OT                 Outcome Measures     Row Name 11/11/21 1300             How much help from another is currently needed...    Putting on and taking off regular lower body clothing? 2  -TS      Bathing (including washing, rinsing, and drying) 2  -TS      Toileting (which includes using toilet bed pan or urinal) 2  -TS      Putting on and taking off regular upper body clothing 3  -TS      Taking care of personal grooming (such as brushing teeth) 3  -TS      Eating meals 3  -TS      AM-PAC 6 Clicks Score (OT) 15  -TS            User Key  (r) = Recorded By, (t) = Taken By, (c) = Cosigned By    Initials Name Provider Type    TS Carolee Giordano COTA Occupational Therapy Assistant                Timed Therapy Charges  Total Units: 7    Charges  Total Units: 7    Procedure Name Documented Minutes Units Code    HC OT SELF CARE/MGMT/TRAIN EA 15   7    49096 (CPT®)               Documented Minutes  Total Minutes: 105    Therapy Provided Minutes    64029 - OT Self Care/Mgmt Minutes 105                    OT Discharge Summary  Anticipated Discharge Disposition (OT): skilled nursing facility  Reason for Discharge: Discharge from facility  Outcomes Achieved: Refer to plan of care for updates on goals achieved  Discharge Destination: SNF      KATHYA Strong/L, CNT  11/13/2021

## 2021-11-13 NOTE — THERAPY DISCHARGE NOTE
Acute Care - Physical Therapy Treatment Note/Discharge  [facility]     Patient Name: Tawny Shin  : 1955  MRN: [id number]  Today's Date: 2021                Admit Date: 2021    Visit Dx:    ICD-10-CM ICD-9-CM   1. Other chronic pain  G89.29 338.29   2. Impaired mobility  Z74.09 799.89   3. Impaired mobility and ADLs  Z74.09 V49.89    Z78.9    4. Dysphagia, unspecified type  R13.10 787.20   5. T12 compression fracture, initial encounter ([facility])  S22.080A 805.2     Patient Active Problem List   Diagnosis   • T12 compression fracture, initial encounter ([facility])   • Chronic embolism and thrombosis of unspecified deep veins of left lower extremity ([facility])   • Urinary tract infection without hematuria   • Acute bilateral thoracic back pain   • Chronic anticoagulation   • Hypokalemia   • Transaminitis   • Iron deficiency anemia   • Osteoporosis   • Bacteremia   • Moderate malnutrition (CMS/[facility])   • Epidural hematoma ([facility])   • Severe malnutrition ([facility])     Past Medical History:   Diagnosis Date   • Age-related osteoporosis with current pathological fracture 2020   • Arthritis    • Asthma    • Bilateral bunions 2020   • Cancer ([facility])    • Cardiac pacemaker syndrome 2020    Overview:  - heart block - implanted    • Charcot's joint of foot, left 2020   • Chronic deep vein thrombosis (DVT) of right lower extremity ([facility]) 2021   • Chronic pain syndrome 2021   • Chronic sinusitis    • COPD (chronic obstructive pulmonary disease) (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   • Elevated cholesterol    • Eustachian tube dysfunction    • Heart disease    • Herpes simplex    • History of transfusion    • Hyperlipidemia    • Hypertension    • Hypothyroidism 2020   • Intrinsic asthma 2020   • Knee dislocation    • Labral tear of right hip joint    • Laryngitis sicca    • Laryngitis, chronic    • Left carotid bruit 3/9/2016   • MI  "(myocardial infarction) ([facility])    • Myalgia due to statin 6/25/2019   • Open wound of right hip 9/14/2021   • Osteomyelitis of right femur ([facility]) 7/6/2021   • Otorrhea    • Pacemaker 11/17/2016   • Primary osteoarthritis of left knee 12/23/2020   • Psoriasis vulgaris 12/23/2020   • S/P coronary artery stent placement 3/9/2016   • Sensorineural hearing loss    • Seropositive rheumatoid arthritis of multiple sites ([facility]) 12/27/2019    Overview:  -myochrysine '93-'96 -methotrexate '96--->11/98;r/s  restarted 2/99--> 8/14 (anemia) -sulfasalazine- not effective -penicillamine 6/98-->10/98; no effect -leflunomide 11/98--> - Humira '13-->didn't take - Enbrel 12/14-->3/15- no effect!   Last Assessment & Plan:  - \"aching all over\" because she had to be off her anti-rheumatic drugs for 2 weeks in preparation for her R knee surgery - he   • Sick sinus syndrome ([facility]) 12/27/2019   • Sjogren's disease ([facility])    • Spondylolisthesis of lumbar region 1/17/2018   • Syncope, recurrent 2/8/2021     Past Surgical History:   Procedure Laterality Date   • A-V CARDIAC PACEMAKER INSERTION  2016   • ATRIAL CARDIAC PACEMAKER INSERTION     • CARDIAC CATHETERIZATION     • CATARACT EXTRACTION     • CERVICAL CORPECTOMY N/A 3/3/2021    Procedure: CERVICAL 6 CORPECTOMY WITH TITANIUM CAGE WITH NEURO MONITORING;  Surgeon: Bandar Shea MD;  Location:  [facility] OR;  Service: Neurosurgery;  Laterality: N/A;   • COLONOSCOPY  11/08/2011    One fold in the ascending colon which showed ulcer otherwise normal exam   • COLONOSCOPY  11/12/2004    Normal exam repeat in five years   • CORONARY ANGIOPLASTY WITH STENT PLACEMENT      X 2; 2013 & 2014   • ENDOSCOPY  07/10/2014    Normal exam   • FLAP LEG Right 9/14/2021    Procedure: RIGHT GLUTEAL FASCIOCUTANEOUS ADVANCEMENT FLAP AND RIGHT TENSOR FASCIAL JESSICA FLAP;  Surgeon: Amadeo Turner MD;  Location:  [facility];  Service: Plastics;  Laterality: Right;   • HIP ABDUCTION TENOTOMY BILATERAL Right 1/14/2021 "    Procedure: RIGHT HIP GLUTEUS MEDLUS / MINIMUS REPAIR, POSSIBLE ACHILLES ALLOGRAFT;  Surgeon: Nino Carlson MD;  Location:  [facility];  Service: Orthopedics;  Laterality: Right;   • INCISION AND DRAINAGE HIP Right 2/9/2021    Procedure: HIP INCISION AND DRAINAGE;  Surgeon: Nino Carlson MD;  Location:  [facility];  Service: Orthopedics;  Laterality: Right;   • INCISION AND DRAINAGE LEG Right 10/24/2021    Procedure: INCISION AND DRAINAGE LOWER EXTREMITY;  Surgeon: Amadeo Turner MD;  Location:  [facility];  Service: Plastics;  Laterality: Right;   • INCISION AND DRAINAGE OF WOUND Right 7/8/2021    Procedure: INCISION AND DRAINAGE WOUND RIGHT HIP;  Surgeon: James Huntley MD;  Location:  [facility];  Service: Orthopedics;  Laterality: Right;   • JOINT REPLACEMENT     • KYPHOPLASTY WITH BIOPSY Bilateral 10/26/2021    Procedure: THOARCIC 12 KYPHOPLASTY WITH BIOPSY;  Surgeon: Bandar Shea MD;  Location:  [facility];  Service: Neurosurgery;  Laterality: Bilateral;   • LEG DEBRIDEMENT Right 9/14/2021    Procedure: DEBRIDEMENT OF RIGHT HIP WOUND, RIGHT GLUTEAL FASCIOCUTANEOUS ADVANCEMENT FLAP AND RIGHT TENSOR FASCIAL JESSICA FLAP;  Surgeon: Amadeo Turner MD;  Location: [facility] OR;  Service: Plastics;  Laterality: Right;   • LUMBAR DISCECTOMY Right 3/23/2021    Procedure: LUMBAR DISCECTOMY MICRO, Lumbar 1/2 right;  Surgeon: Bandar Shea MD;  Location:  [facility];  Service: Neurosurgery;  Laterality: Right;   • LUMBAR FUSION N/A 1/19/2018    Procedure: L3-4,L4-5 DECOMPRESSION, POSTERIOR SPINAL FUSION WITH INSTRUMENTATION;  Surgeon: Fortino Oropeza MD;  Location:  [facility];  Service:    • LUMBAR LAMINECTOMY WITH FUSION Left 1/17/2018    Procedure: LEFT L3-4 L4-5 LATERAL LUMBAR INTERBODY FUSION;  Surgeon: Fortino Oropeza MD;  Location:  [facility];  Service:    • MYRINGOTOMY W/ TUBES  09/04/2014    TUBES NO LONGER IN PLACE   • OTHER SURGICAL HISTORY      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 so hardware  was removed and spacers were placed   • REPLACEMENT TOTAL KNEE Right        PT Assessment (last 12 hours)     PT Evaluation and Treatment     Row Name             Wound 10/24/21 0819 Right hip Incision    Wound - Properties Group Placement Date: 10/24/21  -DT Placement Time: 0819  -DT Present on Hospital Admission: N  -DT Side: Right  -DT Location: hip  -DT Primary Wound Type: Incision  -DT     Retired Wound - Properties Group Date first assessed: 10/24/21  -DT Time first assessed: 0819  -DT Present on Hospital Admission: N  -DT Side: Right  -DT Location: hip  -DT Primary Wound Type: Incision  -DT     Row Name             Wound 11/07/21 0416 Bilateral coccyx    Wound - Properties Group Placement Date: 11/07/21  -LF Placement Time: 0416  -LF Present on Hospital Admission: N  -LF Side: Bilateral  -LF Location: coccyx  -LF     Retired Wound - Properties Group Date first assessed: 11/07/21  -LF Time first assessed: 0416  -LF Present on Hospital Admission: N  -LF Side: Bilateral  -LF Location: coccyx  -LF           User Key  (r) = Recorded By, (t) = Taken By, (c) = Cosigned By    Initials Name Provider Type    DT Jean Carlos Chong RN Registered Nurse    LF Oliveira, So, RN Registered Nurse                Physical Therapy Education                 Title: PT OT SLP Therapies (In Progress)     Topic: Physical Therapy (Resolved)     Point: Mobility training (Resolved)     Learning Progress Summary           Patient Acceptance, E, VU by NN at 11/12/2021 1105    Comment: HEP Exercises and Proper Form, Positioning for Rolling    Acceptance, E, VU by NN at 11/11/2021 1440    Comment: positioning for rolling    Acceptance, E, VU by MS at 11/10/2021 1452    Comment: need for rest breaks between exercise to allow for better movement    Acceptance, E, NR by MS at 11/9/2021 1156    Comment: slide board transfer    Acceptance, E, VU,NR by  at 11/4/2021 1509    Comment: Educated pt on spinal precautions, proper body mechanics  during bed mobility transfers, POC, and d/c.                   Point: Home exercise program (Resolved)     Learning Progress Summary           Patient Acceptance, E, VU by NN at 11/12/2021 1105    Comment: HEP Exercises and Proper Form, Positioning for Rolling                   Point: Body mechanics (Resolved)     Learning Progress Summary           Patient Acceptance, E, VU by NN at 11/12/2021 1105    Comment: HEP Exercises and Proper Form, Positioning for Rolling    Acceptance, E, VU by  at 11/11/2021 1440    Comment: positioning for rolling    Acceptance, E, VU,NR by  at 11/4/2021 1509    Comment: Educated pt on spinal precautions, proper body mechanics during bed mobility transfers, POC, and d/c.                   Point: Precautions (Resolved)     Learning Progress Summary           Patient Acceptance, E, VU by  at 11/12/2021 1105    Comment: HEP Exercises and Proper Form, Positioning for Rolling    Acceptance, E, VU by  at 11/11/2021 1440    Comment: positioning for rolling    Acceptance, E,TB, VU by  at 11/7/2021 1027    Comment: positioning    Acceptance, E, VU,NR by  at 11/4/2021 1509    Comment: Educated pt on spinal precautions, proper body mechanics during bed mobility transfers, POC, and d/c.                               User Key     Initials Effective Dates Name Provider Type Discipline     06/16/21 -  Olga Chambers, PTA Physical Therapy Assistant PT    MS 06/19/18 -  Nicole Olson, PT, DPT, NCS Physical Therapist PT     08/18/21 -  Ana Bender, PT Student PT Student PT     09/07/21 -  Juliana Angela, PT Student PT Student PT                PT Recommendation and Plan  Anticipated Discharge Disposition (PT): skilled nursing facility        Outcome Measures     Row Name 11/11/21 1300             How much help from another is currently needed...    Putting on and taking off regular lower body clothing? 2  -TS      Bathing (including washing, rinsing, and drying) 2  -TS       Toileting (which includes using toilet bed pan or urinal) 2  -TS      Putting on and taking off regular upper body clothing 3  -TS      Taking care of personal grooming (such as brushing teeth) 3  -TS      Eating meals 3  -TS      AM-PAC 6 Clicks Score (OT) 15  -TS            User Key  (r) = Recorded By, (t) = Taken By, (c) = Cosigned By    Initials Name Provider Type    Carolee Roger COTA Occupational Therapy Assistant                 Time Calculation:         PT G-Codes  Outcome Measure Options: AM-PAC 6 Clicks Basic Mobility (PT)  AM-PAC 6 Clicks Score (PT): 11  AM-PAC 6 Clicks Score (OT): 15    PT Discharge Summary  Anticipated Discharge Disposition (PT): skilled nursing facility  Reason for Discharge: Discharge from facility  Outcomes Achieved: Refer to plan of care for updates on goals achieved  Discharge Destination: SNF    Kristy Hebert, PTA  11/13/2021

## 2021-11-14 NOTE — THERAPY DISCHARGE NOTE
Acute Care - Speech Language Pathology Discharge Summary  [facility]       Patient Name: Tawny Shin  : 1955  MRN: [id number]    Today's Date: 2021                   Admit Date: 2021      SLP Recommendation and Plan  [facility] soft with thin  Miranda Anderson, MS CCC-SLP 2021 15:59 CST    Visit Dx:    ICD-10-CM ICD-9-CM   1. Other chronic pain  G89.29 338.29   2. Impaired mobility  Z74.09 799.89   3. Impaired mobility and ADLs  Z74.09 V49.89    Z78.9    4. Dysphagia, unspecified type  R13.10 787.20   5. T12 compression fracture, initial encounter ([facility])  S22.080A 805.2                SLP GOALS     Row Name 21 1500             Oral Nutrition/Hydration Goal 1 (SLP)    Oral Nutrition/Hydration Goal 1, SLP Pt will tolerate LRD without overt s/s of aspiration.  -KW      Time Frame (Oral Nutrition/Hydration Goal 1, SLP) by discharge  -KW      Barriers (Oral Nutrition/Hydration Goal 1, SLP) Cognition  -KW      Progress/Outcomes (Oral Nutrition/Hydration Goal 1, SLP) goal partially met; discharged from facility  -KW              Labial Strengthening Goal 1 (SLP)    Activity (Labial Strengthening Goal 1, SLP) increase labial tone  -KW      Increase Labial Tone labial resistance exercises  -KW      [place]/Accuracy (Labial Strengthening Goal 1, SLP) independently (over 90% accuracy)  -KW      Time Frame (Labial Strengthening Goal 1, SLP) by discharge  -KW      Barriers (Labial Strengthening Goal 1, SLP) Cognition  -KW      Progress/Outcomes (Labial Strengthening Goal 1, SLP) discharged from facility; goal partially met  -KW              Lingual Strengthening Goal 1 (SLP)    Activity (Lingual Strengthening Goal 1, SLP) increase lingual tone/sensation/control/coordination/movement  -KW      Increase Lingual Tone/Sensation/Control/Coordination/Movement lingual movement exercises  -KW      [place]/Accuracy (Lingual Strengthening Goal 1, SLP) independently (over 90% accuracy)  -KW      Time  Frame (Lingual Strengthening Goal 1, SLP) by discharge  -KW      Barriers (Lingual Strengthening Goal 1, SLP) Cognition  -KW      Progress/Outcomes (Lingual Strengthening Goal 1, SLP) goal partially met; discharged from facility  -KW            User Key  (r) = Recorded By, (t) = Taken By, (c) = Cosigned By    Initials Name Provider Type    Miranda Pozo MS CCC-SLP Speech and Language Pathologist                        SLP Discharge Summary  Anticipated Discharge Disposition (SLP): unknown  Reason for Discharge: discharge from this facility  Progress Toward Achieving Short/long Term Goals: goals partially met within established timelines  Discharge Destination: SNF      Miranda Anderson MS CCC-SLP  11/14/2021

## 2021-11-24 RX ORDER — TRAMADOL HYDROCHLORIDE 50 MG/1
TABLET ORAL
Qty: 18 TABLET | Refills: 5 | OUTPATIENT
Start: 2021-11-24

## 2021-12-03 NOTE — PROGRESS NOTES
[facility]                      Inpatient Speech Therapy                Discharge Summary      Date: 12/3/2021  Patient Name: Jacqueline Gaines        MRN: [id number]    Account #: [de-identified]  : 1955  (74 y.o.)  Gender: female   Admit Date:  10/28/2021  Discharge date : November 3, 2021       Admission Diagnoses:   MSSA bacteremia [R78.81, B95.61]  UTI (urinary tract infection) [N39.0]  T12 compression fracture ([address] [S22.080A]     Discharge Diagnoses:  Principal Problem:    MSSA bacteremia  Active Problems:    Coronary artery disease of native artery of native heart with stable angina pectoris ([address]    Hypertension    Rheumatoid arteritis ([facility]    History of pulmonary fibrosis    History of DVT (deep vein thrombosis)    Thyroid disease    Hypercholesterolemia    S/P coronary artery stent placement    Pacemaker    Lumbar stenosis with neurogenic claudication    Primary osteoarthritis of right knee    Discitis    Acute cystitis without hematuria    Closed wedge compression fracture of T2 vertebra with routine healing    S/P kyphoplasty    Acute midline thoracic back pain    Anemia    Severe malnutrition (HCC)  Resolved Problems:         Per physician notes: Due to continued pain in the right hip and back pain, MRI of the right hip and LS spine were obtained. MRI of the LS spine was significant for an epidural hematoma with cord compression worse at L1. MRI of the right hip had evidence of a rim enhancing fluid collection in the right femur with abscess in the differential. There was fluid in the joint space in which infection could not be ruled out. Osteomyelitis was also a consideration based on imaging. Her ASA and Xarelto were held.  Case was discussed with Dr Keturah Carbajal with plan to transfer back to [facility] for monitoring of epidural hematoma off anticoagulation and evaluation of possible right hip infection        SHORT TERM GOAL #1:  Goal 1: The patient will follow 2 step commands related to functional living environment with 100% accuracyand (min) cues in order to increase functional integration into environment. Not Met      SHORT TERM GOAL #2:  Goal 2: The patient will recite automatic speech tasks given (min) cues. Not Met      SHORT TERM GOAL #3:  Goal 3: The patient will provide 15 members in a given category with 100% accuracy and (min) cues in the form of a visual aid, semantic/phonemic cueing, etc. Not Met      SHORT TERM GOAL #4:  Goal 4: The patient will demonstrate functional problem solving and safety awareness with 100% verbal,tactile and visual cues for daily living tasks in order to increase safe interaction with environment and decreased assistance from caregivers. Not Met      SHORT TERM GOAL #5:  Goal 5: The patient will demonstrate recall of functional information following a/an (immediate, short term,long-term) delay with min cues in order to increase functional integration into environment. Not Met    Long Term Goals: The patient will comprehend communication related to basic medical and social needs and utilize compensatory strategies to maintain safety and participate socially in functional living environment. Not Met  The patient will develop functional, cognitive-linguistic-based skills and utilize compensatory strategies to communicate wants and needs effectively to different conversational partners, maintain safety and participate socially in functional living environment. Not Met  The patient will demonstrate functional problem solving skills and provide appropriate solutions to problemsin order to improve safety and awareness in functional living environment.  Not Met          Goals Met: _____0__ STG's     _____0___  LTG's      Plan:  Discharge patient: yes             Discharge Location: to [facility]             Further Speech treatment/recommendations: Recommend continue speech therapy services following discharge                Electronically Signed By: Earl Johnson M.S. CCC-SLP  12/3/2021,2:55 PM.

## 2022-01-05 ENCOUNTER — READMISSION MANAGEMENT (OUTPATIENT)
Dept: CALL CENTER | Facility: HOSPITAL | Age: 67
End: 2022-01-05

## 2022-01-06 ENCOUNTER — TRANSITIONAL CARE MANAGEMENT TELEPHONE ENCOUNTER (OUTPATIENT)
Dept: CALL CENTER | Facility: HOSPITAL | Age: 67
End: 2022-01-06

## 2022-01-06 NOTE — OUTREACH NOTE
Prep Survey      Responses   [facility] facility patient discharged from? Non-BH   Is LACE score < 7 ? Non-BH Discharge   Emergency Room discharge w/ pulse ox? No   Eligibility [facility]   Date of Admission 11/12/21   Date of Discharge 01/05/22   Discharge diagnosis Epidural Hematoma, Bacteremia, chronic rt hip wound   Does the patient have one of the following disease processes/diagnoses(primary or secondary)? Other   Prep survey completed? Yes          Luz Marina Benavides RN

## 2022-01-06 NOTE — OUTREACH NOTE
Call Center TCM Note      Responses   [facility] [place] patient discharged from? Non-   Does the patient have one of the following disease processes/diagnoses(primary or secondary)? Other   TCM attempt successful? Yes   Discharge diagnosis Epidural Hematoma, Bacteremia, chronic rt hip wound   Does the patient have an appointment with their PCP within 7 days of discharge? Greater than 7 days   Comments regarding PCP Patient has an appt on 1/17/22@1015am---not a hospital/rehab f/u---pt is currently still in rehab (appt is a 15 minute appt, will alert staff)   TCM call completed? Yes   Wrap up additional comments Call to patient as she was supposed to be discharged from rehab yesterday-pt communicated she was not but instead transferred to another facility, [facility] as she was diagnosed with Covid.  Reports she will be inpatient at least 10 days.  Pt diagnosed on 1/5/22 and is asymptomatic at this time, was tested due to exposure.  Pt has questions regarding receiving antibody treatment--will forward to her PCP.  Pt can be reached on cell phone.           Shaista Das RN    1/6/2022, 15:20 EST

## 2022-01-10 ENCOUNTER — TELEPHONE (OUTPATIENT)
Dept: INTERNAL MEDICINE | Facility: CLINIC | Age: 67
End: 2022-01-10

## 2022-01-10 NOTE — TELEPHONE ENCOUNTER
Called pt, in regards to the TCM phone call and Dr. Urrutia's response to testing positive for Covid.  She understands that nothing further recommended since she is asymptomatic.

## 2022-01-13 PROCEDURE — 93294 REM INTERROG EVL PM/LDLS PM: CPT | Performed by: INTERNAL MEDICINE

## 2022-01-13 PROCEDURE — 93296 REM INTERROG EVL PM/IDS: CPT | Performed by: INTERNAL MEDICINE

## 2022-01-24 NOTE — TELEPHONE ENCOUNTER
Caller: Tawny Shin    Relationship: Self    NEEDS FOSOLATE 500MG    Pharmacy where request should be sent: [facility] [address] [place] [address] AT  [address] - [phone] ph - [phone]      Murali Greer, PCT   01/24/22

## 2022-01-25 RX ORDER — SALSALATE 500 MG
500 TABLET ORAL DAILY
Qty: 30 TABLET | Refills: 0 | Status: ON HOLD | OUTPATIENT
Start: 2022-01-25 | End: 2022-04-23

## 2022-02-20 NOTE — PROGRESS NOTES
Subjective   Tawny Shin is a 65 y.o. female.   Chief Complaint   Patient presents with   • Follow-up     d/c from [facility] on 06/07/2021; had opperation on right hip in February; neck surgery , then back surgery    • order      has ordered wound vac thru home health and we have an order from home health to sign for this.   • Ear Problem     fell stopped up; started about 2 weeks ago        History of Present Illness this is a transition of care visit for patient who is recently been released from local nursing home facility.  Patient insisted upon leaving [facility] and is currently being moved into a one-story home by her sister.  Patient had surgery in February and had a wound infection which is required IV antibiotics and currently is finishing a 30-day course of oral antibiotics she has a wound VAC in place and that is being managed by home health.    The following portions of the patient's history were reviewed and updated as appropriate: allergies, current medications, past family history, past medical history, past social history, past surgical history and problem list.    Review of Systems   Constitutional: Positive for fatigue. Negative for activity change, appetite change, fever, unexpected weight gain and unexpected weight loss.   HENT: Negative for swollen glands, trouble swallowing and voice change.    Eyes: Negative for blurred vision and visual disturbance.   Respiratory: Negative for cough and shortness of breath.    Cardiovascular: Negative for chest pain, palpitations and leg swelling.   Gastrointestinal: Negative for abdominal pain, constipation, diarrhea, nausea, vomiting and indigestion.   Endocrine: Negative for cold intolerance, heat intolerance, polydipsia and polyphagia.   Genitourinary: Negative for dysuria and frequency.   Musculoskeletal: Positive for arthralgias, back pain and neck pain. Negative for joint swelling.   Skin: Negative for color change,  rash and skin lesions.   Neurological: Negative for dizziness, weakness, headache, memory problem and confusion.   Hematological: Does not bruise/bleed easily.   Psychiatric/Behavioral: Negative for agitation, hallucinations and suicidal ideas. The patient is not nervous/anxious.        Objective   Past Medical History:   Diagnosis Date   • Arthritis    • Asthma    • Chronic sinusitis    • Coronary artery disease    • Eustachian tube dysfunction    • Heart disease    • Herpes simplex    • Hyperlipidemia    • Hypertension    • Knee dislocation    • Labral tear of right hip joint    • Laryngitis sicca    • Laryngitis, chronic    • MI (myocardial infarction) (CMS/HCC)    • Otorrhea    • Sensorineural hearing loss    • Sjogren's disease (CMS/HCC)       Past Surgical History:   Procedure Laterality Date   • A-V CARDIAC PACEMAKER INSERTION  2016   • ATRIAL CARDIAC PACEMAKER INSERTION     • CARDIAC CATHETERIZATION     • CATARACT EXTRACTION     • CERVICAL CORPECTOMY N/A 3/3/2021    Procedure: CERVICAL 6 CORPECTOMY WITH TITANIUM CAGE WITH NEURO MONITORING;  Surgeon: Bandar Shea MD;  Location:  [facility] OR;  Service: Neurosurgery;  Laterality: N/A;   • COLONOSCOPY  11/08/2011    One fold in the ascending colon which showed ulcer otherwise normal exam   • COLONOSCOPY  11/12/2004    Normal exam repeat in five years   • CORONARY ANGIOPLASTY WITH STENT PLACEMENT      X 2; 2013 & 2014   • ENDOSCOPY  07/10/2014    Normal exam   • HIP ABDUCTION TENOTOMY BILATERAL Right 1/14/2021    Procedure: RIGHT HIP GLUTEUS MEDLUS / MINIMUS REPAIR, POSSIBLE ACHILLES ALLOGRAFT;  Surgeon: Nino Carlson MD;  Location:  [facility];  Service: Orthopedics;  Laterality: Right;   • INCISION AND DRAINAGE HIP Right 2/9/2021    Procedure: HIP INCISION AND DRAINAGE;  Surgeon: Nino Carlson MD;  Location:  [facility];  Service: Orthopedics;  Laterality: Right;   • JOINT REPLACEMENT     • LUMBAR DISCECTOMY Right 3/23/2021    Procedure: LUMBAR DISCECTOMY  MICRO, Lumbar 1/2 right;  Surgeon: Bandar Shea MD;  Location:  [facility];  Service: Neurosurgery;  Laterality: Right;   • LUMBAR FUSION N/A 1/19/2018    Procedure: L3-4,L4-5 DECOMPRESSION, POSTERIOR SPINAL FUSION WITH INSTRUMENTATION;  Surgeon: Fortino Oropeza MD;  Location:  [facility];  Service:    • LUMBAR LAMINECTOMY WITH FUSION Left 1/17/2018    Procedure: LEFT L3-4 L4-5 LATERAL LUMBAR INTERBODY FUSION;  Surgeon: Fortino Oropeza MD;  Location:  [facility];  Service:    • MYRINGOTOMY W/ TUBES  09/04/2014    TUBES NO LONGER IN PLACE   • OTHER SURGICAL HISTORY      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 so hardware was removed and spacers were placed   • REPLACEMENT TOTAL KNEE Right         Current Outpatient Medications:   •  bisacodyl (DULCOLAX) 10 MG suppository, Insert 1 suppository into the rectum Daily As Needed for Constipation., Disp:  , Rfl:   •  budesonide (PULMICORT) 0.5 MG/2ML nebulizer solution, Take 2 mL by nebulization 2 (Two) Times a Day As Needed (shortness of air)., Disp:  , Rfl:   •  carvedilol (COREG) 25 MG tablet, Take 25 mg by mouth 2 (Two) Times a Day With Meals., Disp: , Rfl:   •  cephalexin (Keflex) 500 MG capsule, Take 1,000 mg by mouth 3 (Three) Times a Day., Disp: , Rfl:   •  cyclobenzaprine (FLEXERIL) 10 MG tablet, Take 1 tablet by mouth 3 (Three) Times a Day As Needed for Muscle Spasms., Disp:  , Rfl:   •  docusate sodium 100 MG capsule, Take 1 capsule by mouth 2 (Two) Times a Day. (Patient taking differently: Take 100 mg by mouth Daily.), Disp:  , Rfl:   •  famotidine (Pepcid) 40 MG tablet, Take 40 mg by mouth Daily., Disp: , Rfl:   •  fluticasone (FLONASE) 50 MCG/ACT nasal spray, 1 spray into the nostril(s) as directed by provider Daily., Disp: , Rfl:   •  furosemide (LASIX) 40 MG tablet, Take 40 mg by mouth Daily., Disp: , Rfl:   •  gabapentin (NEURONTIN) 100 MG capsule, Take 1 capsule by mouth Every 12 (Twelve) Hours., Disp: 60 capsule, Rfl: 0  •  guaiFENesin (MUCINEX) 600  MG 12 hr tablet, Take 1 tablet by mouth Every 12 (Twelve) Hours., Disp: , Rfl:   •  isosorbide mononitrate (IMDUR) 60 MG 24 hr tablet, Take 1 tablet by mouth 2 (Two) Times a Day., Disp: , Rfl:   •  levothyroxine (SYNTHROID, LEVOTHROID) 75 MCG tablet, Take 1 tablet by mouth Daily., Disp: , Rfl:   •  magnesium hydroxide (MILK OF MAGNESIA) 2400 MG/10ML suspension suspension, Take 10 mL by mouth Daily As Needed., Disp: , Rfl:   •  magnesium oxide (MAG-OX) 400 MG tablet, Take 400 mg by mouth., Disp: , Rfl:   •  multivitamin with minerals tablet tablet, Take 1 tablet by mouth Daily., Disp:  , Rfl:   •  Naloxegol Oxalate (Movantik) 25 MG tablet, Take 25 mg by mouth Every Morning., Disp: , Rfl:   •  naloxone (NARCAN) 0.4 MG/ML injection, Infuse 0.25 mL into a venous catheter Every 5 (Five) Minutes As Needed for Opioid Reversal or Respiratory Depression (see administration instructions)., Disp:  , Rfl:   •  nitroglycerin (Nitrostat) 0.4 MG SL tablet, Place 1 tablet under the tongue Every 5 (Five) Minutes As Needed for Chest Pain. Take no more than 3 doses in 15 minutes., Disp: , Rfl: 12  •  ondansetron (ZOFRAN) 4 MG tablet, Take 1 tablet by mouth Every 6 (Six) Hours As Needed for Nausea or Vomiting., Disp:  , Rfl:   •  oxyCODONE (ROXICODONE) 15 MG immediate release tablet, Take 15 mg by mouth Every 6 (Six) Hours As Needed for Moderate Pain ., Disp: , Rfl:   •  pantoprazole (PROTONIX) 40 MG EC tablet, Take 1 tablet by mouth Daily., Disp: , Rfl:   •  polyethylene glycol (polyethylene glycol) 17 g packet, Take 17 g by mouth Daily., Disp:  , Rfl:   •  potassium chloride (K-DUR,KLOR-CON) 20 MEQ CR tablet, Take 20 mEq by mouth., Disp: , Rfl:   •  predniSONE (DELTASONE) 1 MG tablet, Take 2 tablets by mouth Daily., Disp: , Rfl:   •  promethazine (PHENERGAN) 25 MG tablet, Take 25 mg by mouth Every 6 (Six) Hours As Needed for Nausea or Vomiting., Disp: , Rfl:   •  Saccharomyces boulardii (FLORASTOR PO), Take 250 mg by mouth Daily.,  Disp: , Rfl:   •  salsalate (DISALCID) 500 MG tablet, Take 500 mg by mouth Daily., Disp: , Rfl:   •  traMADol (ULTRAM) 50 MG tablet, Take 50 mg by mouth Every 6 (Six) Hours As Needed for Moderate Pain ., Disp: , Rfl:   •  valACYclovir (VALTREX) 500 MG tablet, Take 1 tablet by mouth Daily. Indications: chronic suppressive therapy, Disp:  , Rfl:   •  gabapentin (NEURONTIN) 300 MG capsule, Take 300 mg by mouth., Disp: , Rfl:      Vitals:    06/16/21 0938   BP: 100/58   Pulse: 81   Temp: 97.8 °F (36.6 °C)   SpO2: 95%         06/16/21 0938   Weight: 88 kg (194 lb)         Physical Exam  Constitutional:       Appearance: She is well-developed.   HENT:      Head: Normocephalic and atraumatic.   Eyes:      Pupils: Pupils are equal, round, and reactive to light.   Cardiovascular:      Rate and Rhythm: Normal rate and regular rhythm.   Pulmonary:      Effort: Pulmonary effort is normal.      Breath sounds: Normal breath sounds.   Abdominal:      General: Bowel sounds are normal.      Palpations: Abdomen is soft.   Musculoskeletal:         General: Normal range of motion.      Cervical back: Normal range of motion and neck supple.   Skin:     General: Skin is warm and dry.   Neurological:      Mental Status: She is alert and oriented to person, place, and time.   Psychiatric:         Behavior: Behavior normal.               Assessment/Plan   Diagnoses and all orders for this visit:    1. Seropositive rheumatoid arthritis of multiple sites (CMS/HCC) (Primary)  -     CBC & Differential  -     Comprehensive Metabolic Panel    2. Essential hypertension    3. Wound infection after surgery    At this point patient seems to be doing relatively well she is comes into the office using a walker.  I am going to go ahead and get some lab work today I reviewed her medications she has not had a blood clot throughout her hospitalization she has been on prophylactic subcu heparin that is being discontinued at this point.  I have encouraged  her to make sure that she has someone that she safeguards against falling.  She is to continue her current wound care home health is been arranged we will be following up with those orders.              no bleeding gums/no nausea/no numbness

## 2022-02-24 ENCOUNTER — OFFICE VISIT (OUTPATIENT)
Dept: INTERNAL MEDICINE | Facility: CLINIC | Age: 67
End: 2022-02-24

## 2022-02-24 VITALS
SYSTOLIC BLOOD PRESSURE: 110 MMHG | DIASTOLIC BLOOD PRESSURE: 50 MMHG | TEMPERATURE: 97.1 F | HEART RATE: 70 BPM | OXYGEN SATURATION: 96 %

## 2022-02-24 DIAGNOSIS — D64.9 ANEMIA, UNSPECIFIED TYPE: ICD-10-CM

## 2022-02-24 DIAGNOSIS — J30.9 ALLERGIC RHINITIS, UNSPECIFIED SEASONALITY, UNSPECIFIED TRIGGER: ICD-10-CM

## 2022-02-24 DIAGNOSIS — S91.301A WOUND OF RIGHT FOOT: ICD-10-CM

## 2022-02-24 DIAGNOSIS — Z86.718 HISTORY OF DVT (DEEP VEIN THROMBOSIS): ICD-10-CM

## 2022-02-24 DIAGNOSIS — S91.302A WOUND OF LEFT FOOT: Primary | ICD-10-CM

## 2022-02-24 DIAGNOSIS — R26.2 AMBULATORY DYSFUNCTION: ICD-10-CM

## 2022-02-24 PROCEDURE — 99214 OFFICE O/P EST MOD 30 MIN: CPT | Performed by: FAMILY MEDICINE

## 2022-02-24 RX ORDER — DULOXETIN HYDROCHLORIDE 30 MG/1
30 CAPSULE, DELAYED RELEASE ORAL DAILY
Status: ON HOLD | COMMUNITY
End: 2022-04-23

## 2022-02-24 RX ORDER — FLUTICASONE PROPIONATE 50 MCG
1 SPRAY, SUSPENSION (ML) NASAL 2 TIMES DAILY
Qty: 16 G | Refills: 6 | Status: ON HOLD | OUTPATIENT
Start: 2022-02-24 | End: 2022-04-23

## 2022-02-24 RX ORDER — HYDROCODONE BITARTRATE AND ACETAMINOPHEN 7.5; 325 MG/1; MG/1
1 TABLET ORAL EVERY 8 HOURS PRN
Status: ON HOLD | COMMUNITY
End: 2022-04-26 | Stop reason: SDUPTHER

## 2022-02-24 RX ORDER — SULFAMETHOXAZOLE AND TRIMETHOPRIM 800; 160 MG/1; MG/1
1 TABLET ORAL 2 TIMES DAILY
COMMUNITY
Start: 2022-02-15 | End: 2022-03-01

## 2022-03-04 ENCOUNTER — OFFICE VISIT (OUTPATIENT)
Dept: WOUND CARE | Facility: HOSPITAL | Age: 67
End: 2022-03-04

## 2022-03-04 DIAGNOSIS — S22.080S WEDGE COMPRESSION FRACTURE OF T11-T12 VERTEBRA, SEQUELA: ICD-10-CM

## 2022-03-04 DIAGNOSIS — L89.513 PRESSURE ULCER OF RIGHT ANKLE, STAGE 3: ICD-10-CM

## 2022-03-04 DIAGNOSIS — L89.623 PRESSURE ULCER OF LEFT HEEL, STAGE 3: ICD-10-CM

## 2022-03-04 DIAGNOSIS — R26.89 OTHER ABNORMALITIES OF GAIT AND MOBILITY: ICD-10-CM

## 2022-03-04 PROBLEM — E44.0 MODERATE MALNUTRITION: Status: RESOLVED | Noted: 2021-10-22 | Resolved: 2022-03-04

## 2022-03-04 PROBLEM — E43 SEVERE MALNUTRITION: Status: RESOLVED | Noted: 2021-11-09 | Resolved: 2022-03-04

## 2022-03-04 PROCEDURE — G0463 HOSPITAL OUTPT CLINIC VISIT: HCPCS

## 2022-03-04 PROCEDURE — 99213 OFFICE O/P EST LOW 20 MIN: CPT | Performed by: PODIATRIST

## 2022-03-04 PROCEDURE — 11042 DBRDMT SUBQ TIS 1ST 20SQCM/<: CPT | Performed by: PODIATRIST

## 2022-03-11 ENCOUNTER — OFFICE VISIT (OUTPATIENT)
Dept: WOUND CARE | Facility: HOSPITAL | Age: 67
End: 2022-03-11

## 2022-03-11 DIAGNOSIS — R26.89 OTHER ABNORMALITIES OF GAIT AND MOBILITY: ICD-10-CM

## 2022-03-11 DIAGNOSIS — S22.080S WEDGE COMPRESSION FRACTURE OF T11-T12 VERTEBRA, SEQUELA: ICD-10-CM

## 2022-03-11 DIAGNOSIS — L89.513 PRESSURE ULCER OF RIGHT ANKLE, STAGE 3: ICD-10-CM

## 2022-03-11 DIAGNOSIS — L89.623 PRESSURE ULCER OF LEFT HEEL, STAGE 3: ICD-10-CM

## 2022-03-11 PROCEDURE — 97597 DBRDMT OPN WND 1ST 20 CM/<: CPT | Performed by: PODIATRIST

## 2022-03-11 PROCEDURE — 11042 DBRDMT SUBQ TIS 1ST 20SQCM/<: CPT | Performed by: PODIATRIST

## 2022-03-18 ENCOUNTER — OFFICE VISIT (OUTPATIENT)
Dept: WOUND CARE | Facility: HOSPITAL | Age: 67
End: 2022-03-18

## 2022-03-18 DIAGNOSIS — R26.89 OTHER ABNORMALITIES OF GAIT AND MOBILITY: ICD-10-CM

## 2022-03-18 DIAGNOSIS — L89.513 PRESSURE ULCER OF RIGHT ANKLE, STAGE 3: ICD-10-CM

## 2022-03-18 DIAGNOSIS — L89.623 PRESSURE ULCER OF LEFT HEEL, STAGE 3: ICD-10-CM

## 2022-03-18 DIAGNOSIS — S22.080S WEDGE COMPRESSION FRACTURE OF T11-T12 VERTEBRA, SEQUELA: ICD-10-CM

## 2022-03-18 PROCEDURE — 11042 DBRDMT SUBQ TIS 1ST 20SQCM/<: CPT | Performed by: PODIATRIST

## 2022-03-25 ENCOUNTER — OFFICE VISIT (OUTPATIENT)
Dept: WOUND CARE | Facility: HOSPITAL | Age: 67
End: 2022-03-25

## 2022-03-25 DIAGNOSIS — L89.513 PRESSURE ULCER OF RIGHT ANKLE, STAGE 3: ICD-10-CM

## 2022-03-25 DIAGNOSIS — L89.623 PRESSURE ULCER OF LEFT HEEL, STAGE 3: ICD-10-CM

## 2022-03-25 DIAGNOSIS — R26.89 OTHER ABNORMALITIES OF GAIT AND MOBILITY: ICD-10-CM

## 2022-03-25 DIAGNOSIS — S22.080S WEDGE COMPRESSION FRACTURE OF T11-T12 VERTEBRA, SEQUELA: ICD-10-CM

## 2022-03-25 PROCEDURE — 11042 DBRDMT SUBQ TIS 1ST 20SQCM/<: CPT | Performed by: PODIATRIST

## 2022-03-28 ENCOUNTER — OFFICE VISIT (OUTPATIENT)
Dept: INTERNAL MEDICINE | Facility: CLINIC | Age: 67
End: 2022-03-28

## 2022-03-28 VITALS
HEART RATE: 68 BPM | HEIGHT: 66 IN | BODY MASS INDEX: 25.07 KG/M2 | WEIGHT: 156 LBS | TEMPERATURE: 96.6 F | OXYGEN SATURATION: 99 % | SYSTOLIC BLOOD PRESSURE: 120 MMHG | DIASTOLIC BLOOD PRESSURE: 66 MMHG

## 2022-03-28 DIAGNOSIS — M25.60 DECREASED RANGE OF MOTION: ICD-10-CM

## 2022-03-28 DIAGNOSIS — L89.629 PRESSURE ULCER OF BOTH HEELS: ICD-10-CM

## 2022-03-28 DIAGNOSIS — L89.619 PRESSURE ULCER OF BOTH HEELS: ICD-10-CM

## 2022-03-28 DIAGNOSIS — R53.1 GENERALIZED WEAKNESS: Primary | ICD-10-CM

## 2022-03-28 DIAGNOSIS — M54.2 NECK PAIN ON RIGHT SIDE: ICD-10-CM

## 2022-03-28 PROCEDURE — 99214 OFFICE O/P EST MOD 30 MIN: CPT | Performed by: FAMILY MEDICINE

## 2022-03-28 RX ORDER — LIDOCAINE 50 MG/G
3 PATCH TOPICAL EVERY 24 HOURS
Qty: 90 PATCH | Refills: 3 | Status: SHIPPED | OUTPATIENT
Start: 2022-03-28 | End: 2022-03-28

## 2022-03-28 RX ORDER — ASCORBIC ACID 500 MG
500 TABLET ORAL DAILY
COMMUNITY
End: 2023-01-09

## 2022-03-28 RX ORDER — ZINC SULFATE 50(220)MG
220 CAPSULE ORAL DAILY
COMMUNITY
End: 2022-04-26 | Stop reason: HOSPADM

## 2022-03-28 RX ORDER — COLLAGENASE SANTYL 250 [ARB'U]/G
1 OINTMENT TOPICAL DAILY
Status: ON HOLD | COMMUNITY
End: 2022-04-23

## 2022-03-28 RX ORDER — LIDOCAINE 50 MG/G
3 PATCH TOPICAL EVERY 24 HOURS
Qty: 90 PATCH | Refills: 3 | Status: ON HOLD | OUTPATIENT
Start: 2022-03-28 | End: 2022-04-23

## 2022-03-29 NOTE — PROGRESS NOTES
Subjective     Chief Complaint   Patient presents with   • Follow-up     Follow up from 02/24/222. Pt states her ears did better for a few days but then they have started hurting again    • Sore Throat     Right side        History of Present Illness  The patient consents to the use of JAMARI.      The patient states her throat is painful on the right side, as well as her ears. She notes her bilateral feet continue to have edema. The patient denies shortness of breath. She notes her appetite is good. The patient denies sores on her buttock. She denies utilizing a lidocaine patch on her neck. The patient states her shoulders are becoming immobile.     She reports she is staying at a nursing home rehabilitation facility.     The patient is accompanied by a female adult.    Patient's PMR from outside medical facility reviewed and noted.    Review of Systems     Otherwise complete ROS reviewed and negative except as mentioned in the HPI.    Past Medical History:   Past Medical History:   Diagnosis Date   • Age-related osteoporosis with current pathological fracture 5/27/2020   • Arthritis    • Asthma    • Bilateral bunions 12/23/2020   • Cancer (HCC)    • Cardiac pacemaker syndrome 12/23/2020    Overview:  - heart block - implanted 11/16   • Charcot's joint of foot, left 12/23/2020   • Chronic deep vein thrombosis (DVT) of right lower extremity (HCC) 6/23/2021   • Chronic pain syndrome 6/22/2021   • Chronic sinusitis    • COPD (chronic obstructive pulmonary disease) ([facility])    • Coronary artery disease    • 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   • Elevated cholesterol    • Eustachian tube dysfunction    • Heart disease    • Herpes simplex    • History of transfusion    • Hyperlipidemia    • Hypertension    • Hypothyroidism 12/23/2020   • Intrinsic asthma 12/23/2020   • Knee dislocation    • Labral tear of right hip joint    • Laryngitis sicca    • Laryngitis, chronic    • Left carotid bruit 3/9/2016   • MI  "(myocardial infarction) ([facility])    • Myalgia due to statin 6/25/2019   • Open wound of right hip 9/14/2021   • Osteomyelitis of right femur ([facility]) 7/6/2021   • Otorrhea    • Pacemaker 11/17/2016   • Primary osteoarthritis of left knee 12/23/2020   • Psoriasis vulgaris 12/23/2020   • S/P coronary artery stent placement 3/9/2016   • Sensorineural hearing loss    • Seropositive rheumatoid arthritis of multiple sites ([facility]) 12/27/2019    Overview:  -myochrysine '93-'96 -methotrexate '96--->11/98;r/s  restarted 2/99--> 8/14 (anemia) -sulfasalazine- not effective -penicillamine 6/98-->10/98; no effect -leflunomide 11/98--> - Humira '13-->didn't take - Enbrel 12/14-->3/15- no effect!   Last Assessment & Plan:  - \"aching all over\" because she had to be off her anti-rheumatic drugs for 2 weeks in preparation for her R knee surgery - he   • Sick sinus syndrome ([facility]) 12/27/2019   • Sjogren's disease ([facility])    • Spondylolisthesis of lumbar region 1/17/2018   • Syncope, recurrent 2/8/2021     Past Surgical History:  Past Surgical History:   Procedure Laterality Date   • A-V CARDIAC PACEMAKER INSERTION  2016   • ATRIAL CARDIAC PACEMAKER INSERTION     • CARDIAC CATHETERIZATION     • CATARACT EXTRACTION     • CERVICAL CORPECTOMY N/A 3/3/2021    Procedure: CERVICAL 6 CORPECTOMY WITH TITANIUM CAGE WITH NEURO MONITORING;  Surgeon: Bandar Shea MD;  Location:  [facility] OR;  Service: Neurosurgery;  Laterality: N/A;   • COLONOSCOPY  11/08/2011    One fold in the ascending colon which showed ulcer otherwise normal exam   • COLONOSCOPY  11/12/2004    Normal exam repeat in five years   • CORONARY ANGIOPLASTY WITH STENT PLACEMENT      X 2; 2013 & 2014   • ENDOSCOPY  07/10/2014    Normal exam   • FLAP LEG Right 9/14/2021    Procedure: RIGHT GLUTEAL FASCIOCUTANEOUS ADVANCEMENT FLAP AND RIGHT TENSOR FASCIAL JESSICA FLAP;  Surgeon: Amadeo Turner MD;  Location:  [facility] OR;  Service: Plastics;  Laterality: Right;   • HIP ABDUCTION TENOTOMY " BILATERAL Right 1/14/2021    Procedure: RIGHT HIP GLUTEUS MEDLUS / MINIMUS REPAIR, POSSIBLE ACHILLES ALLOGRAFT;  Surgeon: Nino Carlson MD;  Location:  [facility] OR;  Service: Orthopedics;  Laterality: Right;   • INCISION AND DRAINAGE HIP Right 2/9/2021    Procedure: HIP INCISION AND DRAINAGE;  Surgeon: Nino Carlson MD;  Location:  [facility] OR;  Service: Orthopedics;  Laterality: Right;   • INCISION AND DRAINAGE LEG Right 10/24/2021    Procedure: INCISION AND DRAINAGE LOWER EXTREMITY;  Surgeon: Amadeo Turner MD;  Location:  [facility] OR;  Service: Plastics;  Laterality: Right;   • INCISION AND DRAINAGE OF WOUND Right 7/8/2021    Procedure: INCISION AND DRAINAGE WOUND RIGHT HIP;  Surgeon: James Huntley MD;  Location:  [facility] OR;  Service: Orthopedics;  Laterality: Right;   • JOINT REPLACEMENT     • KYPHOPLASTY WITH BIOPSY Bilateral 10/26/2021    Procedure: THOARCIC 12 KYPHOPLASTY WITH BIOPSY;  Surgeon: Bandar Shea MD;  Location:  [facility] OR;  Service: Neurosurgery;  Laterality: Bilateral;   • LEG DEBRIDEMENT Right 9/14/2021    Procedure: DEBRIDEMENT OF RIGHT HIP WOUND, RIGHT GLUTEAL FASCIOCUTANEOUS ADVANCEMENT FLAP AND RIGHT TENSOR FASCIAL JESSICA FLAP;  Surgeon: Amadeo Turner MD;  Location: [facility] OR;  Service: Plastics;  Laterality: Right;   • LUMBAR DISCECTOMY Right 3/23/2021    Procedure: LUMBAR DISCECTOMY MICRO, Lumbar 1/2 right;  Surgeon: Bandar Shea MD;  Location:  [facility] OR;  Service: Neurosurgery;  Laterality: Right;   • LUMBAR FUSION N/A 1/19/2018    Procedure: L3-4,L4-5 DECOMPRESSION, POSTERIOR SPINAL FUSION WITH INSTRUMENTATION;  Surgeon: Fortino Oropeza MD;  Location:  [facility] OR;  Service:    • LUMBAR LAMINECTOMY WITH FUSION Left 1/17/2018    Procedure: LEFT L3-4 L4-5 LATERAL LUMBAR INTERBODY FUSION;  Surgeon: Fortino Oropeza MD;  Location:  [facility] OR;  Service:    • MYRINGOTOMY W/ TUBES  09/04/2014    TUBES NO LONGER IN PLACE   • OTHER SURGICAL HISTORY      total knee was  infected twice so hardware was removed and spacers were placed   • REPLACEMENT TOTAL KNEE Right      Social History:  reports that she has never smoked. She has never used smokeless tobacco. She reports that she does not drink alcohol and does not use drugs.    Family History: family history includes Cancer in her mother; Heart disease in her father.      Allergies:  Allergies   Allergen Reactions   • Atorvastatin Other (See Comments)     LEG CRAMPS     • Amoxicillin Rash   • Escitalopram Rash   • Nabumetone Rash   • Niacin Er Rash   • Penicillins Rash   • Simvastatin Rash     Medications:  Prior to Admission medications    Medication Sig Start Date End Date Taking? Authorizing Provider   acetaminophen (TYLENOL) 325 MG tablet Take 650 mg by mouth Every 6 (Six) Hours As Needed for Mild Pain .   Yes Leila Daly MD   apixaban (ELIQUIS) 5 MG tablet tablet Take 1 tablet by mouth 2 (Two) Times a Day. 11/12/21  Yes Taiwo Prado APRN   ascorbic acid (VITAMIN C) 500 MG tablet Take 500 mg by mouth Daily.   Yes Leila Daly MD   aspirin 81 MG EC tablet Take 81 mg by mouth Daily.   Yes Leila Daly MD   carvedilol (COREG) 25 MG tablet Take 1 tablet by mouth 2 (Two) Times a Day With Meals. 7/1/21  Yes Davon Urrutia MD   collagenase (Santyl) 250 UNIT/GM ointment Apply 1 application topically to the appropriate area as directed Daily.   Yes Leila Daly MD   Diclofenac Sodium (VOLTAREN) 1 % gel gel Apply 4 g topically to the appropriate area as directed 4 (Four) Times a Day As Needed.   Yes Leila Daly MD   docusate sodium 100 MG capsule Take 1 capsule by mouth 2 (Two) Times a Day. 7/13/21  Yes Christos Min DO   DULoxetine (CYMBALTA) 30 MG capsule Take 30 mg by mouth Daily.   Yes Leila Daly MD   DULoxetine (CYMBALTA) 60 MG capsule Take 1 capsule by mouth Daily. 11/17/21  Yes Taiwo Prado APRN   famotidine (PEPCID) 40 MG tablet Take 40 mg by mouth Daily.   Yes  ProviderLeila MD   ferrous sulfate 325 (65 Fe) MG tablet Take 325 mg by mouth Daily With Breakfast.   Yes Leila Daly MD   fluticasone (FLONASE) 50 MCG/ACT nasal spray 1 spray into the nostril(s) as directed by provider 2 (Two) Times a Day. 2/24/22  Yes Leta Julian DO   folic acid (FOLVITE) 1 MG tablet Take 1 mg by mouth Daily.   Yes Leila Daly MD   furosemide (LASIX) 40 MG tablet Take 40 mg by mouth Daily.   Yes Leila Daly MD   HYDROcodone-acetaminophen (NORCO) 7.5-325 MG per tablet Take 1 tablet by mouth Every 8 (Eight) Hours As Needed for Moderate Pain .   Yes Leila Daly MD   hydrocortisone-bacitracin-zinc oxide-nystatin (MAGIC BARRIER) Apply 1 application topically to the appropriate area as directed As Needed (Buttocks and sacrococcygeal region). 11/12/21  Yes Taiwo Prado APRN   isosorbide mononitrate (IMDUR) 60 MG 24 hr tablet Take 1 tablet by mouth 2 (Two) Times a Day. 3/26/21  Yes Taiwo Prado APRN   levothyroxine (SYNTHROID, LEVOTHROID) 75 MCG tablet Take 1 tablet by mouth Daily. 6/22/21  Yes Davon Urrutia MD   lidocaine (LIDODERM) 5 % Place 3 patches on the skin as directed by provider Daily. Place 3 patches daily in area of pain  Remove after 12 hours 3/28/22  Yes Leta Julian DO   magnesium oxide (MAG-OX) 400 MG tablet Take 400 mg by mouth Daily. 4/12/21  Yes Leila Daly MD   Menthol-Zinc Oxide (Chamosyn) 0.45-20 % ointment Apply  topically.   Yes Leila Daly MD   multivitamin with minerals tablet tablet Take 1 tablet by mouth Daily. 3/6/21  Yes Taiwo Prado APRN   Naloxegol Oxalate (Movantik) 25 MG tablet Take 25 mg by mouth Every Morning.   Yes Leila Daly MD   nitroglycerin (Nitrostat) 0.4 MG SL tablet Place 1 tablet under the tongue Every 5 (Five) Minutes As Needed for Chest Pain. Take no more than 3 doses in 15 minutes. 3/26/21  Yes Taiwo Prado APRN   ondansetron (ZOFRAN) 4 MG tablet Take  "1 tablet by mouth Every 6 (Six) Hours As Needed for Nausea or Vomiting. 11/12/21  Yes Taiwo Prado APRN   pantoprazole (PROTONIX) 40 MG EC tablet Take 1 tablet by mouth Daily. 6/22/21  Yes Davon Urrutia MD   polyethylene glycol (MIRALAX) 17 g packet Take 17 g by mouth Daily As Needed (Use if senna-docusate is ineffective). 11/12/21  Yes Taiwo Prado APRN   potassium chloride (KLOR-CON) 20 MEQ packet Take 20 mEq by mouth Daily.   Yes Provider, Historical, MD   saccharomyces boulardii (Florastor) 250 MG capsule Take 1 capsule by mouth Daily. 7/1/21  Yes Davon Urrutia MD   salsalate (DISALCID) 500 MG tablet Take 1 tablet by mouth Daily. Take 5 tablets by mouth Monday, Wednesdays and Fridays, and 4 tablets on Tuesdays, Thursday and Sundays 1/25/22  Yes Rachele Serrano APRN   sennosides-docusate (PERICOLACE) 8.6-50 MG per tablet Take 2 tablets by mouth 2 (Two) Times a Day. 11/12/21  Yes Taiwo Prado APRN   sucralfate (CARAFATE) 1 g tablet Take 1 g by mouth 4 (Four) Times a Day Before Meals & at Bedtime.   Yes Leila Daly MD   thiamine1 (VITAMIN B1) 250 MG tablet Take 250 mg by mouth Daily.   Yes Leila Daly MD   traMADol (ULTRAM) 50 MG tablet Take 50 mg by mouth 2 (Two) Times a Day.   Yes Leila Daly MD   tuberculin (Tubersol) 5 UNIT/0.1ML injection Inject  into the appropriate area of the skin as directed by provider 1 (One) Time.   Yes Leila Daly MD   valACYclovir (VALTREX) 500 MG tablet Take 1 tablet by mouth Daily. Indications: chronic suppressive therapy 6/22/21  Yes Davon Urrutia MD   zinc sulfate (ZINCATE) 220 (50 Zn) MG capsule Take 220 mg by mouth Daily.   Yes Leila Daly MD       Objective     Vital Signs: /66 (BP Location: Right arm, Patient Position: Sitting, Cuff Size: Adult)   Pulse 68   Temp 96.6 °F (35.9 °C) (Temporal)   Ht 167.6 cm (66\")   Wt 70.8 kg (156 lb)   LMP  (LMP Unknown)   SpO2 99%   BMI 25.18 kg/m² "   Physical Exam  Vitals reviewed.   Constitutional:       General: She is not in acute distress.     Appearance: Normal appearance. She is well-developed. She is not diaphoretic.   HENT:      Head: Normocephalic and atraumatic. Hair is normal.      Right Ear: Hearing, tympanic membrane, ear canal and external ear normal. No decreased hearing noted. No drainage.      Left Ear: Hearing, tympanic membrane, ear canal and external ear normal. No decreased hearing noted.      Nose: Nose normal. No nasal deformity.      Mouth/Throat:      Mouth: Mucous membranes are moist.   Eyes:      General: Lids are normal.         Right eye: No discharge.         Left eye: No discharge.      Extraocular Movements: Extraocular movements intact.      Conjunctiva/sclera: Conjunctivae normal.      Pupils: Pupils are equal, round, and reactive to light.   Neck:      Thyroid: No thyromegaly.      Vascular: No JVD.   Cardiovascular:      Rate and Rhythm: Normal rate and regular rhythm.      Pulses: Normal pulses.      Heart sounds: Normal heart sounds. No murmur heard.    No friction rub. No gallop.   Pulmonary:      Effort: Pulmonary effort is normal. No respiratory distress.      Breath sounds: Normal breath sounds.   Abdominal:      General: Bowel sounds are normal. There is no distension.      Palpations: Abdomen is soft.      Tenderness: There is no abdominal tenderness.   Musculoskeletal:         General: No tenderness or deformity.      Cervical back: Normal range of motion and neck supple.      Comments: Decreased AROM right side.    Lymphadenopathy:      Cervical: No cervical adenopathy.   Skin:     General: Skin is warm and dry.      Capillary Refill: Capillary refill takes less than 2 seconds.      Findings: No erythema or rash.      Comments: Pressure ulcerations bilateral heels are improving.   Neurological:      Mental Status: She is alert and oriented to person, place, and time.      Cranial Nerves: No cranial nerve deficit.    Psychiatric:         Mood and Affect: Mood normal.         Behavior: Behavior normal.         Thought Content: Thought content normal.         Judgment: Judgment normal.               Results Reviewed:  Glucose   Date Value Ref Range Status   11/11/2021 82 65 - 99 mg/dL Final   09/03/2019 84 74 - 109 mg/dL Final     BUN   Date Value Ref Range Status   11/11/2021 17 8 - 23 mg/dL Final   09/03/2019 16 8 - 23 mg/dL Final     Creatinine   Date Value Ref Range Status   11/11/2021 0.60 0.57 - 1.00 mg/dL Final   07/08/2020 0.90 0.60 - 1.30 mg/dL Final     Comment:     Serial Number: [id number]:  [id number]   09/03/2019 0.7 0.5 - 0.9 mg/dL Final     Sodium   Date Value Ref Range Status   11/11/2021 135 (L) 136 - 145 mmol/L Final   09/03/2019 142 136 - 145 mmol/L Final     Potassium   Date Value Ref Range Status   11/11/2021 5.2 3.5 - 5.2 mmol/L Final   09/03/2019 3.9 3.5 - 5.0 mmol/L Final     Chloride   Date Value Ref Range Status   11/11/2021 99 98 - 107 mmol/L Final   09/03/2019 111 98 - 111 mmol/L Final     CO2   Date Value Ref Range Status   11/11/2021 27.0 22.0 - 29.0 mmol/L Final   09/03/2019 24 22 - 29 mmol/L Final     Calcium   Date Value Ref Range Status   11/11/2021 9.3 8.6 - 10.5 mg/dL Final   09/03/2019 8.2 (L) 8.8 - 10.2 mg/dL Final     ALT (SGPT)   Date Value Ref Range Status   11/11/2021 <5 1 - 33 U/L Final   03/29/2021 <5 (A) 5 - 33 U/L Final     AST (SGOT)   Date Value Ref Range Status   11/11/2021 20 1 - 32 U/L Final   03/29/2021 11 5 - 32 U/L Final     WBC   Date Value Ref Range Status   11/11/2021 4.27 3.40 - 10.80 10*3/mm3 Final   11/01/2021 6.5 4.8 - 10.8 K/uL Final     Hematocrit   Date Value Ref Range Status   11/11/2021 25.6 (L) 34.0 - 46.6 % Final   11/01/2021 30.0 (L) 37.0 - 47.0 % Final     Platelets   Date Value Ref Range Status   11/11/2021 205 140 - 450 10*3/mm3 Final   11/01/2021 298 130 - 400 K/uL Final     Total Cholesterol   Date Value Ref Range Status   02/09/2021 129 0 - 200 mg/dL  Final     Triglycerides   Date Value Ref Range Status   02/09/2021 108 0 - 150 mg/dL Final     HDL Cholesterol   Date Value Ref Range Status   02/09/2021 36 (L) 40 - 60 mg/dL Final     LDL Cholesterol    Date Value Ref Range Status   02/09/2021 73 0 - 100 mg/dL Final     LDL/HDL Ratio   Date Value Ref Range Status   02/09/2021 1.98  Final     Hemoglobin A1C   Date Value Ref Range Status   02/09/2021 5.50 4.80 - 5.60 % Final         Assessment / Plan     Assessment/Plan:  1. Generalized weakness      2. Decreased range of motion      3. Pressure ulcer of both heels      4. Neck pain on right side        Plan: The patient is encouraged to do range of motion activities with her upper extremities while in bed. We have also discussed doing circular range of motion while she is sitting. For her pain, we are recommending her to use the [facility] dermatology patch on her neck and shoulders if they are bothering her. She can use up to three patches daily. She is to continue with would care. We are going to follow her back in 60 days.     Return in about 2 months (around 5/27/2022). unless patient needs to be seen sooner or acute issues arise.      I have discussed the patient results/orders and and plan/recommendation with them at today's visit.    Transcribed from ambient dictation for Leta Julian DO by Trinity Germain.  03/29/22   06:56 CDT      Patient agrees to JAMARI recording

## 2022-04-01 ENCOUNTER — OFFICE VISIT (OUTPATIENT)
Dept: WOUND CARE | Facility: HOSPITAL | Age: 67
End: 2022-04-01

## 2022-04-01 DIAGNOSIS — L89.513 PRESSURE ULCER OF RIGHT ANKLE, STAGE 3: ICD-10-CM

## 2022-04-01 DIAGNOSIS — Z79.01 LONG TERM (CURRENT) USE OF ANTICOAGULANTS: ICD-10-CM

## 2022-04-01 DIAGNOSIS — R26.89 OTHER ABNORMALITIES OF GAIT AND MOBILITY: ICD-10-CM

## 2022-04-01 DIAGNOSIS — L89.623 PRESSURE ULCER OF LEFT HEEL, STAGE 3: ICD-10-CM

## 2022-04-01 DIAGNOSIS — S22.080S WEDGE COMPRESSION FRACTURE OF T11-T12 VERTEBRA, SEQUELA: ICD-10-CM

## 2022-04-01 PROCEDURE — 97597 DBRDMT OPN WND 1ST 20 CM/<: CPT | Performed by: PODIATRIST

## 2022-04-01 PROCEDURE — 11042 DBRDMT SUBQ TIS 1ST 20SQCM/<: CPT | Performed by: PODIATRIST

## 2022-04-08 ENCOUNTER — OFFICE VISIT (OUTPATIENT)
Dept: WOUND CARE | Facility: HOSPITAL | Age: 67
End: 2022-04-08

## 2022-04-08 DIAGNOSIS — L89.513 PRESSURE ULCER OF RIGHT ANKLE, STAGE 3: ICD-10-CM

## 2022-04-08 DIAGNOSIS — S22.080S WEDGE COMPRESSION FRACTURE OF T11-T12 VERTEBRA, SEQUELA: ICD-10-CM

## 2022-04-08 DIAGNOSIS — L89.623 PRESSURE ULCER OF LEFT HEEL, STAGE 3: ICD-10-CM

## 2022-04-08 DIAGNOSIS — R26.89 OTHER ABNORMALITIES OF GAIT AND MOBILITY: ICD-10-CM

## 2022-04-08 PROCEDURE — 11042 DBRDMT SUBQ TIS 1ST 20SQCM/<: CPT | Performed by: PODIATRIST

## 2022-04-15 ENCOUNTER — OFFICE VISIT (OUTPATIENT)
Dept: WOUND CARE | Facility: HOSPITAL | Age: 67
End: 2022-04-15

## 2022-04-15 DIAGNOSIS — L89.513 PRESSURE ULCER OF RIGHT ANKLE, STAGE 3: ICD-10-CM

## 2022-04-15 DIAGNOSIS — L89.623 PRESSURE ULCER OF LEFT HEEL, STAGE 3: ICD-10-CM

## 2022-04-15 DIAGNOSIS — S22.080S WEDGE COMPRESSION FRACTURE OF T11-T12 VERTEBRA, SEQUELA: ICD-10-CM

## 2022-04-15 DIAGNOSIS — R26.89 OTHER ABNORMALITIES OF GAIT AND MOBILITY: ICD-10-CM

## 2022-04-15 PROCEDURE — 11042 DBRDMT SUBQ TIS 1ST 20SQCM/<: CPT | Performed by: PODIATRIST

## 2022-04-19 ENCOUNTER — TELEPHONE (OUTPATIENT)
Dept: PODIATRY | Facility: CLINIC | Age: 67
End: 2022-04-19

## 2022-04-22 ENCOUNTER — APPOINTMENT (OUTPATIENT)
Dept: CT IMAGING | Facility: HOSPITAL | Age: 67
End: 2022-04-22

## 2022-04-22 ENCOUNTER — APPOINTMENT (OUTPATIENT)
Dept: GENERAL RADIOLOGY | Facility: HOSPITAL | Age: 67
End: 2022-04-22

## 2022-04-22 ENCOUNTER — OFFICE VISIT (OUTPATIENT)
Dept: WOUND CARE | Facility: HOSPITAL | Age: 67
End: 2022-04-22

## 2022-04-22 ENCOUNTER — HOSPITAL ENCOUNTER (INPATIENT)
Facility: HOSPITAL | Age: 67
LOS: 4 days | Discharge: NURSING, REHAB OR LONG-TERM CARE | End: 2022-04-26
Attending: EMERGENCY MEDICINE | Admitting: INTERNAL MEDICINE

## 2022-04-22 DIAGNOSIS — R07.9 CHEST PAIN, UNSPECIFIED TYPE: ICD-10-CM

## 2022-04-22 DIAGNOSIS — R60.0 EDEMA OF BOTH LOWER EXTREMITIES: ICD-10-CM

## 2022-04-22 DIAGNOSIS — L89.513 PRESSURE ULCER OF RIGHT ANKLE, STAGE 3: ICD-10-CM

## 2022-04-22 DIAGNOSIS — R26.89 OTHER ABNORMALITIES OF GAIT AND MOBILITY: ICD-10-CM

## 2022-04-22 DIAGNOSIS — S22.080S WEDGE COMPRESSION FRACTURE OF T11-T12 VERTEBRA, SEQUELA: ICD-10-CM

## 2022-04-22 DIAGNOSIS — R77.8 TROPONIN I ABOVE REFERENCE RANGE: ICD-10-CM

## 2022-04-22 DIAGNOSIS — M06.9 RHEUMATOID ARTHRITIS INVOLVING MULTIPLE SITES, UNSPECIFIED WHETHER RHEUMATOID FACTOR PRESENT: ICD-10-CM

## 2022-04-22 DIAGNOSIS — Z78.9 IMPAIRED MOBILITY AND ADLS: ICD-10-CM

## 2022-04-22 DIAGNOSIS — J90 PLEURAL EFFUSION: ICD-10-CM

## 2022-04-22 DIAGNOSIS — J90 PLEURAL EFFUSION: Primary | ICD-10-CM

## 2022-04-22 DIAGNOSIS — Z79.01 LONG TERM (CURRENT) USE OF ANTICOAGULANTS: ICD-10-CM

## 2022-04-22 DIAGNOSIS — M62.81 MUSCLE WEAKNESS: ICD-10-CM

## 2022-04-22 DIAGNOSIS — Z74.09 IMPAIRED MOBILITY AND ADLS: ICD-10-CM

## 2022-04-22 PROBLEM — F44.4 FUNCTIONAL NEUROLOGICAL SYMPTOM DISORDER WITH WEAKNESS OR PARALYSIS: Status: ACTIVE | Noted: 2022-04-22

## 2022-04-22 LAB
ALBUMIN SERPL-MCNC: 2.7 G/DL (ref 3.5–5.2)
ALBUMIN/GLOB SERPL: 0.6 G/DL
ALP SERPL-CCNC: 81 U/L (ref 39–117)
ALT SERPL W P-5'-P-CCNC: 8 U/L (ref 1–33)
ANION GAP SERPL CALCULATED.3IONS-SCNC: 10 MMOL/L (ref 5–15)
APTT PPP: 47.4 SECONDS (ref 24.1–35)
AST SERPL-CCNC: 17 U/L (ref 1–32)
BACTERIA UR QL AUTO: ABNORMAL /HPF
BASOPHILS # BLD AUTO: 0.03 10*3/MM3 (ref 0–0.2)
BASOPHILS NFR BLD AUTO: 0.6 % (ref 0–1.5)
BILIRUB SERPL-MCNC: 0.2 MG/DL (ref 0–1.2)
BILIRUB UR QL STRIP: NEGATIVE
BUN SERPL-MCNC: 15 MG/DL (ref 8–23)
BUN/CREAT SERPL: 20.8 (ref 7–25)
CALCIUM SPEC-SCNC: 8.9 MG/DL (ref 8.6–10.5)
CHLORIDE SERPL-SCNC: 100 MMOL/L (ref 98–107)
CLARITY UR: ABNORMAL
CO2 SERPL-SCNC: 27 MMOL/L (ref 22–29)
COLOR UR: YELLOW
CREAT SERPL-MCNC: 0.72 MG/DL (ref 0.57–1)
D DIMER PPP FEU-MCNC: 2.93 MG/L (FEU) (ref 0–0.5)
DEPRECATED RDW RBC AUTO: 53.5 FL (ref 37–54)
EGFRCR SERPLBLD CKD-EPI 2021: 91.2 ML/MIN/1.73
EOSINOPHIL # BLD AUTO: 0.2 10*3/MM3 (ref 0–0.4)
EOSINOPHIL NFR BLD AUTO: 3.8 % (ref 0.3–6.2)
ERYTHROCYTE [DISTWIDTH] IN BLOOD BY AUTOMATED COUNT: 16.2 % (ref 12.3–15.4)
GLOBULIN UR ELPH-MCNC: 4.5 GM/DL
GLUCOSE SERPL-MCNC: 104 MG/DL (ref 65–99)
GLUCOSE UR STRIP-MCNC: NEGATIVE MG/DL
HCT VFR BLD AUTO: 28.4 % (ref 34–46.6)
HGB BLD-MCNC: 8.9 G/DL (ref 12–15.9)
HGB UR QL STRIP.AUTO: ABNORMAL
HOLD SPECIMEN: NORMAL
HOLD SPECIMEN: NORMAL
HYALINE CASTS UR QL AUTO: ABNORMAL /LPF
IMM GRANULOCYTES # BLD AUTO: 0.01 10*3/MM3 (ref 0–0.05)
IMM GRANULOCYTES NFR BLD AUTO: 0.2 % (ref 0–0.5)
INR PPP: 1.49 (ref 0.91–1.09)
KETONES UR QL STRIP: NEGATIVE
LEUKOCYTE ESTERASE UR QL STRIP.AUTO: ABNORMAL
LYMPHOCYTES # BLD AUTO: 1.2 10*3/MM3 (ref 0.7–3.1)
LYMPHOCYTES NFR BLD AUTO: 22.6 % (ref 19.6–45.3)
MCH RBC QN AUTO: 28.1 PG (ref 26.6–33)
MCHC RBC AUTO-ENTMCNC: 31.3 G/DL (ref 31.5–35.7)
MCV RBC AUTO: 89.6 FL (ref 79–97)
MONOCYTES # BLD AUTO: 0.55 10*3/MM3 (ref 0.1–0.9)
MONOCYTES NFR BLD AUTO: 10.4 % (ref 5–12)
NEUTROPHILS NFR BLD AUTO: 3.31 10*3/MM3 (ref 1.7–7)
NEUTROPHILS NFR BLD AUTO: 62.4 % (ref 42.7–76)
NITRITE UR QL STRIP: NEGATIVE
NRBC BLD AUTO-RTO: 0 /100 WBC (ref 0–0.2)
NT-PROBNP SERPL-MCNC: 865 PG/ML (ref 0–900)
PH UR STRIP.AUTO: 7 [PH] (ref 5–8)
PLATELET # BLD AUTO: 245 10*3/MM3 (ref 140–450)
PMV BLD AUTO: 9.1 FL (ref 6–12)
POTASSIUM SERPL-SCNC: 4.1 MMOL/L (ref 3.5–5.2)
PROCALCITONIN SERPL-MCNC: 0.07 NG/ML (ref 0–0.25)
PROT SERPL-MCNC: 7.2 G/DL (ref 6–8.5)
PROT UR QL STRIP: ABNORMAL
PROTHROMBIN TIME: 17.4 SECONDS (ref 11.9–14.6)
RBC # BLD AUTO: 3.17 10*6/MM3 (ref 3.77–5.28)
RBC # UR STRIP: ABNORMAL /HPF
REF LAB TEST METHOD: ABNORMAL
SARS-COV-2 RNA PNL SPEC NAA+PROBE: NOT DETECTED
SODIUM SERPL-SCNC: 137 MMOL/L (ref 136–145)
SP GR UR STRIP: 1.01 (ref 1–1.03)
SQUAMOUS #/AREA URNS HPF: ABNORMAL /HPF
TROPONIN T SERPL-MCNC: 0.04 NG/ML (ref 0–0.03)
TROPONIN T SERPL-MCNC: 0.05 NG/ML (ref 0–0.03)
UROBILINOGEN UR QL STRIP: ABNORMAL
WBC # UR STRIP: ABNORMAL /HPF
WBC NRBC COR # BLD: 5.3 10*3/MM3 (ref 3.4–10.8)
WHOLE BLOOD HOLD SPECIMEN: NORMAL
WHOLE BLOOD HOLD SPECIMEN: NORMAL

## 2022-04-22 PROCEDURE — 87635 SARS-COV-2 COVID-19 AMP PRB: CPT | Performed by: EMERGENCY MEDICINE

## 2022-04-22 PROCEDURE — 85610 PROTHROMBIN TIME: CPT | Performed by: EMERGENCY MEDICINE

## 2022-04-22 PROCEDURE — 83880 ASSAY OF NATRIURETIC PEPTIDE: CPT | Performed by: EMERGENCY MEDICINE

## 2022-04-22 PROCEDURE — 87086 URINE CULTURE/COLONY COUNT: CPT | Performed by: INTERNAL MEDICINE

## 2022-04-22 PROCEDURE — 85025 COMPLETE CBC W/AUTO DIFF WBC: CPT | Performed by: EMERGENCY MEDICINE

## 2022-04-22 PROCEDURE — 71275 CT ANGIOGRAPHY CHEST: CPT

## 2022-04-22 PROCEDURE — 80053 COMPREHEN METABOLIC PANEL: CPT | Performed by: EMERGENCY MEDICINE

## 2022-04-22 PROCEDURE — 85379 FIBRIN DEGRADATION QUANT: CPT | Performed by: EMERGENCY MEDICINE

## 2022-04-22 PROCEDURE — 93005 ELECTROCARDIOGRAM TRACING: CPT | Performed by: EMERGENCY MEDICINE

## 2022-04-22 PROCEDURE — 81001 URINALYSIS AUTO W/SCOPE: CPT | Performed by: INTERNAL MEDICINE

## 2022-04-22 PROCEDURE — 94640 AIRWAY INHALATION TREATMENT: CPT

## 2022-04-22 PROCEDURE — 85730 THROMBOPLASTIN TIME PARTIAL: CPT | Performed by: EMERGENCY MEDICINE

## 2022-04-22 PROCEDURE — 87186 SC STD MICRODIL/AGAR DIL: CPT | Performed by: INTERNAL MEDICINE

## 2022-04-22 PROCEDURE — 36415 COLL VENOUS BLD VENIPUNCTURE: CPT

## 2022-04-22 PROCEDURE — 99284 EMERGENCY DEPT VISIT MOD MDM: CPT

## 2022-04-22 PROCEDURE — 84484 ASSAY OF TROPONIN QUANT: CPT | Performed by: EMERGENCY MEDICINE

## 2022-04-22 PROCEDURE — 0 IOPAMIDOL PER 1 ML: Performed by: EMERGENCY MEDICINE

## 2022-04-22 PROCEDURE — 71045 X-RAY EXAM CHEST 1 VIEW: CPT

## 2022-04-22 PROCEDURE — 93010 ELECTROCARDIOGRAM REPORT: CPT | Performed by: INTERNAL MEDICINE

## 2022-04-22 PROCEDURE — 11042 DBRDMT SUBQ TIS 1ST 20SQCM/<: CPT | Performed by: PODIATRIST

## 2022-04-22 PROCEDURE — 84145 PROCALCITONIN (PCT): CPT | Performed by: INTERNAL MEDICINE

## 2022-04-22 PROCEDURE — 99213 OFFICE O/P EST LOW 20 MIN: CPT | Performed by: PODIATRIST

## 2022-04-22 PROCEDURE — 94799 UNLISTED PULMONARY SVC/PX: CPT

## 2022-04-22 RX ORDER — TRAMADOL HYDROCHLORIDE 50 MG/1
50 TABLET ORAL 2 TIMES DAILY
Status: DISCONTINUED | OUTPATIENT
Start: 2022-04-22 | End: 2022-04-26 | Stop reason: HOSPADM

## 2022-04-22 RX ORDER — FOLIC ACID 1 MG/1
1 TABLET ORAL DAILY
Status: DISCONTINUED | OUTPATIENT
Start: 2022-04-23 | End: 2022-04-26 | Stop reason: HOSPADM

## 2022-04-22 RX ORDER — ACETAMINOPHEN 325 MG/1
650 TABLET ORAL EVERY 4 HOURS PRN
Status: DISCONTINUED | OUTPATIENT
Start: 2022-04-22 | End: 2022-04-26 | Stop reason: HOSPADM

## 2022-04-22 RX ORDER — FAMOTIDINE 20 MG/1
40 TABLET, FILM COATED ORAL DAILY
Status: DISCONTINUED | OUTPATIENT
Start: 2022-04-22 | End: 2022-04-23

## 2022-04-22 RX ORDER — FERROUS SULFATE 325(65) MG
325 TABLET ORAL
Status: DISCONTINUED | OUTPATIENT
Start: 2022-04-23 | End: 2022-04-26 | Stop reason: HOSPADM

## 2022-04-22 RX ORDER — IPRATROPIUM BROMIDE AND ALBUTEROL SULFATE 2.5; .5 MG/3ML; MG/3ML
3 SOLUTION RESPIRATORY (INHALATION)
Status: DISCONTINUED | OUTPATIENT
Start: 2022-04-22 | End: 2022-04-26 | Stop reason: HOSPADM

## 2022-04-22 RX ORDER — LEVOTHYROXINE SODIUM 0.07 MG/1
75 TABLET ORAL
Status: DISCONTINUED | OUTPATIENT
Start: 2022-04-23 | End: 2022-04-26 | Stop reason: HOSPADM

## 2022-04-22 RX ORDER — SODIUM CHLORIDE 0.9 % (FLUSH) 0.9 %
10 SYRINGE (ML) INJECTION AS NEEDED
Status: DISCONTINUED | OUTPATIENT
Start: 2022-04-22 | End: 2022-04-26 | Stop reason: HOSPADM

## 2022-04-22 RX ORDER — ACETAMINOPHEN 650 MG/1
650 SUPPOSITORY RECTAL EVERY 4 HOURS PRN
Status: DISCONTINUED | OUTPATIENT
Start: 2022-04-22 | End: 2022-04-26 | Stop reason: HOSPADM

## 2022-04-22 RX ORDER — SACCHAROMYCES BOULARDII 250 MG
250 CAPSULE ORAL DAILY
Status: DISCONTINUED | OUTPATIENT
Start: 2022-04-23 | End: 2022-04-26 | Stop reason: HOSPADM

## 2022-04-22 RX ORDER — POLYETHYLENE GLYCOL 3350 17 G/17G
17 POWDER, FOR SOLUTION ORAL DAILY PRN
Status: DISCONTINUED | OUTPATIENT
Start: 2022-04-22 | End: 2022-04-26 | Stop reason: HOSPADM

## 2022-04-22 RX ORDER — HYDROCODONE BITARTRATE AND ACETAMINOPHEN 7.5; 325 MG/1; MG/1
1 TABLET ORAL EVERY 8 HOURS PRN
Status: DISCONTINUED | OUTPATIENT
Start: 2022-04-22 | End: 2022-04-26 | Stop reason: HOSPADM

## 2022-04-22 RX ORDER — ACETAMINOPHEN 160 MG/5ML
650 SOLUTION ORAL EVERY 4 HOURS PRN
Status: DISCONTINUED | OUTPATIENT
Start: 2022-04-22 | End: 2022-04-26 | Stop reason: HOSPADM

## 2022-04-22 RX ORDER — CARVEDILOL 25 MG/1
25 TABLET ORAL 2 TIMES DAILY WITH MEALS
Status: DISCONTINUED | OUTPATIENT
Start: 2022-04-22 | End: 2022-04-26 | Stop reason: HOSPADM

## 2022-04-22 RX ORDER — AMOXICILLIN 250 MG
2 CAPSULE ORAL 2 TIMES DAILY
Status: DISCONTINUED | OUTPATIENT
Start: 2022-04-22 | End: 2022-04-26 | Stop reason: HOSPADM

## 2022-04-22 RX ORDER — ONDANSETRON 2 MG/ML
4 INJECTION INTRAMUSCULAR; INTRAVENOUS EVERY 6 HOURS PRN
Status: DISCONTINUED | OUTPATIENT
Start: 2022-04-22 | End: 2022-04-26 | Stop reason: HOSPADM

## 2022-04-22 RX ORDER — ONDANSETRON 4 MG/1
4 TABLET, FILM COATED ORAL EVERY 6 HOURS PRN
Status: DISCONTINUED | OUTPATIENT
Start: 2022-04-22 | End: 2022-04-26 | Stop reason: HOSPADM

## 2022-04-22 RX ORDER — BUMETANIDE 0.25 MG/ML
1 INJECTION INTRAMUSCULAR; INTRAVENOUS ONCE
Status: COMPLETED | OUTPATIENT
Start: 2022-04-22 | End: 2022-04-22

## 2022-04-22 RX ORDER — GUAIFENESIN 600 MG/1
1200 TABLET, EXTENDED RELEASE ORAL EVERY 12 HOURS SCHEDULED
Status: DISCONTINUED | OUTPATIENT
Start: 2022-04-22 | End: 2022-04-26 | Stop reason: HOSPADM

## 2022-04-22 RX ORDER — DULOXETIN HYDROCHLORIDE 30 MG/1
60 CAPSULE, DELAYED RELEASE ORAL DAILY
Status: DISCONTINUED | OUTPATIENT
Start: 2022-04-23 | End: 2022-04-26 | Stop reason: HOSPADM

## 2022-04-22 RX ORDER — SODIUM CHLORIDE 0.9 % (FLUSH) 0.9 %
10 SYRINGE (ML) INJECTION EVERY 12 HOURS SCHEDULED
Status: DISCONTINUED | OUTPATIENT
Start: 2022-04-22 | End: 2022-04-26 | Stop reason: HOSPADM

## 2022-04-22 RX ORDER — PANTOPRAZOLE SODIUM 40 MG/1
40 TABLET, DELAYED RELEASE ORAL
Status: DISCONTINUED | OUTPATIENT
Start: 2022-04-23 | End: 2022-04-26 | Stop reason: HOSPADM

## 2022-04-22 RX ORDER — DULOXETIN HYDROCHLORIDE 30 MG/1
30 CAPSULE, DELAYED RELEASE ORAL DAILY
Status: DISCONTINUED | OUTPATIENT
Start: 2022-04-23 | End: 2022-04-26 | Stop reason: HOSPADM

## 2022-04-22 RX ADMIN — IOPAMIDOL 76 ML: 755 INJECTION, SOLUTION INTRAVENOUS at 12:38

## 2022-04-22 RX ADMIN — MAGNESIUM GLUCONATE 500 MG ORAL TABLET 400 MG: 500 TABLET ORAL at 21:10

## 2022-04-22 RX ADMIN — CARVEDILOL 25 MG: 25 TABLET, FILM COATED ORAL at 21:10

## 2022-04-22 RX ADMIN — TRAMADOL HYDROCHLORIDE 50 MG: 50 TABLET, COATED ORAL at 21:29

## 2022-04-22 RX ADMIN — COLLAGENASE SANTYL 1 APPLICATION: 250 OINTMENT TOPICAL at 21:12

## 2022-04-22 RX ADMIN — GUAIFENESIN 1200 MG: 600 TABLET, EXTENDED RELEASE ORAL at 22:21

## 2022-04-22 RX ADMIN — FAMOTIDINE 40 MG: 20 TABLET, FILM COATED ORAL at 21:10

## 2022-04-22 RX ADMIN — Medication 10 ML: at 21:11

## 2022-04-22 RX ADMIN — DOCUSATE SODIUM 50 MG AND SENNOSIDES 8.6 MG 2 TABLET: 8.6; 5 TABLET, FILM COATED ORAL at 21:10

## 2022-04-22 RX ADMIN — BUMETANIDE 1 MG: 0.25 INJECTION INTRAMUSCULAR; INTRAVENOUS at 21:09

## 2022-04-22 RX ADMIN — IPRATROPIUM BROMIDE AND ALBUTEROL SULFATE 3 ML: .5; 3 SOLUTION RESPIRATORY (INHALATION) at 22:28

## 2022-04-23 ENCOUNTER — APPOINTMENT (OUTPATIENT)
Dept: GENERAL RADIOLOGY | Facility: HOSPITAL | Age: 67
End: 2022-04-23

## 2022-04-23 PROBLEM — M06.9 RHEUMATOID ARTHRITIS INVOLVING MULTIPLE SITES: Status: ACTIVE | Noted: 2022-04-23

## 2022-04-23 PROBLEM — E66.3 OVERWEIGHT (BMI 25.0-29.9): Status: ACTIVE | Noted: 2022-04-23

## 2022-04-23 PROBLEM — I50.30 (HFPEF) HEART FAILURE WITH PRESERVED EJECTION FRACTION: Status: ACTIVE | Noted: 2022-04-23

## 2022-04-23 PROBLEM — I87.2 VENOUS INSUFFICIENCY: Status: ACTIVE | Noted: 2022-04-23

## 2022-04-23 LAB
ALBUMIN FLD-MCNC: 1.8 G/DL
ALBUMIN SERPL-MCNC: 2.6 G/DL (ref 3.5–5.2)
ALBUMIN/GLOB SERPL: 0.6 G/DL
ALP SERPL-CCNC: 79 U/L (ref 39–117)
ALT SERPL W P-5'-P-CCNC: 7 U/L (ref 1–33)
ANION GAP SERPL CALCULATED.3IONS-SCNC: 8 MMOL/L (ref 5–15)
APPEARANCE FLD: ABNORMAL
AST SERPL-CCNC: 17 U/L (ref 1–32)
BILIRUB SERPL-MCNC: 0.3 MG/DL (ref 0–1.2)
BUN SERPL-MCNC: 15 MG/DL (ref 8–23)
BUN/CREAT SERPL: 18.3 (ref 7–25)
CALCIUM SPEC-SCNC: 8.8 MG/DL (ref 8.6–10.5)
CHLORIDE SERPL-SCNC: 100 MMOL/L (ref 98–107)
CO2 SERPL-SCNC: 29 MMOL/L (ref 22–29)
COLOR FLD: YELLOW
CREAT SERPL-MCNC: 0.82 MG/DL (ref 0.57–1)
EGFRCR SERPLBLD CKD-EPI 2021: 78 ML/MIN/1.73
EOSINOPHIL NFR FLD MANUAL: 2 %
GLOBULIN UR ELPH-MCNC: 4.4 GM/DL
GLUCOSE FLD-MCNC: 92 MG/DL
GLUCOSE SERPL-MCNC: 84 MG/DL (ref 65–99)
HBA1C MFR BLD: 5.2 % (ref 4.8–5.6)
LDH FLD-CCNC: 215 U/L
LDH SERPL-CCNC: 194 U/L (ref 135–214)
LYMPHOCYTES NFR FLD MANUAL: 20 %
MAGNESIUM SERPL-MCNC: 1.8 MG/DL (ref 1.6–2.4)
MESOTHL CELL NFR FLD MANUAL: 63 %
MONOCYTES NFR FLD: 1 %
NEUTROPHILS NFR FLD MANUAL: 14 %
PHOSPHATE SERPL-MCNC: 4 MG/DL (ref 2.5–4.5)
POTASSIUM SERPL-SCNC: 4 MMOL/L (ref 3.5–5.2)
PROT FLD-MCNC: 4 G/DL
PROT SERPL-MCNC: 7 G/DL (ref 6–8.5)
QT INTERVAL: 400 MS
QT INTERVAL: 412 MS
QTC INTERVAL: 418 MS
QTC INTERVAL: 422 MS
RBC # FLD AUTO: 1000 /MM3
SODIUM SERPL-SCNC: 137 MMOL/L (ref 136–145)
TSH SERPL DL<=0.05 MIU/L-ACNC: 8.54 UIU/ML (ref 0.27–4.2)
WBC # FLD AUTO: 670 /MM3

## 2022-04-23 PROCEDURE — 83036 HEMOGLOBIN GLYCOSYLATED A1C: CPT | Performed by: INTERNAL MEDICINE

## 2022-04-23 PROCEDURE — 80053 COMPREHEN METABOLIC PANEL: CPT | Performed by: INTERNAL MEDICINE

## 2022-04-23 PROCEDURE — 84311 SPECTROPHOTOMETRY: CPT | Performed by: INTERNAL MEDICINE

## 2022-04-23 PROCEDURE — 89051 BODY FLUID CELL COUNT: CPT | Performed by: INTERNAL MEDICINE

## 2022-04-23 PROCEDURE — 84443 ASSAY THYROID STIM HORMONE: CPT | Performed by: INTERNAL MEDICINE

## 2022-04-23 PROCEDURE — 83615 LACTATE (LD) (LDH) ENZYME: CPT | Performed by: INTERNAL MEDICINE

## 2022-04-23 PROCEDURE — 87070 CULTURE OTHR SPECIMN AEROBIC: CPT | Performed by: INTERNAL MEDICINE

## 2022-04-23 PROCEDURE — 84100 ASSAY OF PHOSPHORUS: CPT | Performed by: INTERNAL MEDICINE

## 2022-04-23 PROCEDURE — 87015 SPECIMEN INFECT AGNT CONCNTJ: CPT | Performed by: INTERNAL MEDICINE

## 2022-04-23 PROCEDURE — 94799 UNLISTED PULMONARY SVC/PX: CPT

## 2022-04-23 PROCEDURE — 88305 TISSUE EXAM BY PATHOLOGIST: CPT | Performed by: INTERNAL MEDICINE

## 2022-04-23 PROCEDURE — 88112 CYTOPATH CELL ENHANCE TECH: CPT | Performed by: INTERNAL MEDICINE

## 2022-04-23 PROCEDURE — 84157 ASSAY OF PROTEIN OTHER: CPT | Performed by: INTERNAL MEDICINE

## 2022-04-23 PROCEDURE — 87075 CULTR BACTERIA EXCEPT BLOOD: CPT | Performed by: INTERNAL MEDICINE

## 2022-04-23 PROCEDURE — 87206 SMEAR FLUORESCENT/ACID STAI: CPT | Performed by: INTERNAL MEDICINE

## 2022-04-23 PROCEDURE — 82042 OTHER SOURCE ALBUMIN QUAN EA: CPT | Performed by: INTERNAL MEDICINE

## 2022-04-23 PROCEDURE — 0W9B3ZZ DRAINAGE OF LEFT PLEURAL CAVITY, PERCUTANEOUS APPROACH: ICD-10-PCS | Performed by: INTERNAL MEDICINE

## 2022-04-23 PROCEDURE — 87102 FUNGUS ISOLATION CULTURE: CPT | Performed by: INTERNAL MEDICINE

## 2022-04-23 PROCEDURE — 82945 GLUCOSE OTHER FLUID: CPT | Performed by: INTERNAL MEDICINE

## 2022-04-23 PROCEDURE — 87116 MYCOBACTERIA CULTURE: CPT | Performed by: INTERNAL MEDICINE

## 2022-04-23 PROCEDURE — 87205 SMEAR GRAM STAIN: CPT | Performed by: INTERNAL MEDICINE

## 2022-04-23 PROCEDURE — 83735 ASSAY OF MAGNESIUM: CPT | Performed by: INTERNAL MEDICINE

## 2022-04-23 PROCEDURE — 0 LIDOCAINE 1 % SOLUTION: Performed by: INTERNAL MEDICINE

## 2022-04-23 PROCEDURE — 71045 X-RAY EXAM CHEST 1 VIEW: CPT

## 2022-04-23 PROCEDURE — 84478 ASSAY OF TRIGLYCERIDES: CPT | Performed by: INTERNAL MEDICINE

## 2022-04-23 RX ORDER — LIDOCAINE HYDROCHLORIDE 10 MG/ML
20 INJECTION, SOLUTION INFILTRATION; PERINEURAL ONCE
Status: COMPLETED | OUTPATIENT
Start: 2022-04-23 | End: 2022-04-23

## 2022-04-23 RX ORDER — FLUTICASONE PROPIONATE 50 MCG
1 SPRAY, SUSPENSION (ML) NASAL DAILY
COMMUNITY
End: 2022-05-24 | Stop reason: SDUPTHER

## 2022-04-23 RX ORDER — BUMETANIDE 0.25 MG/ML
1 INJECTION INTRAMUSCULAR; INTRAVENOUS ONCE
Status: COMPLETED | OUTPATIENT
Start: 2022-04-23 | End: 2022-04-23

## 2022-04-23 RX ORDER — ASPIRIN 81 MG/1
81 TABLET ORAL DAILY
Status: DISCONTINUED | OUTPATIENT
Start: 2022-04-23 | End: 2022-04-26 | Stop reason: HOSPADM

## 2022-04-23 RX ADMIN — DULOXETINE HYDROCHLORIDE 30 MG: 30 CAPSULE, DELAYED RELEASE ORAL at 09:12

## 2022-04-23 RX ADMIN — ACETAMINOPHEN 650 MG: 325 TABLET ORAL at 12:28

## 2022-04-23 RX ADMIN — PANTOPRAZOLE SODIUM 40 MG: 40 TABLET, DELAYED RELEASE ORAL at 05:51

## 2022-04-23 RX ADMIN — FOLIC ACID 1 MG: 1 TABLET ORAL at 09:10

## 2022-04-23 RX ADMIN — DOCUSATE SODIUM 50 MG AND SENNOSIDES 8.6 MG 2 TABLET: 8.6; 5 TABLET, FILM COATED ORAL at 20:12

## 2022-04-23 RX ADMIN — LIDOCAINE HYDROCHLORIDE 20 ML: 10 INJECTION, SOLUTION INFILTRATION; PERINEURAL at 12:28

## 2022-04-23 RX ADMIN — DOCUSATE SODIUM 50 MG AND SENNOSIDES 8.6 MG 2 TABLET: 8.6; 5 TABLET, FILM COATED ORAL at 09:11

## 2022-04-23 RX ADMIN — IPRATROPIUM BROMIDE AND ALBUTEROL SULFATE 3 ML: .5; 3 SOLUTION RESPIRATORY (INHALATION) at 14:09

## 2022-04-23 RX ADMIN — IPRATROPIUM BROMIDE AND ALBUTEROL SULFATE 3 ML: .5; 3 SOLUTION RESPIRATORY (INHALATION) at 20:00

## 2022-04-23 RX ADMIN — FAMOTIDINE 40 MG: 20 TABLET, FILM COATED ORAL at 09:13

## 2022-04-23 RX ADMIN — CARVEDILOL 25 MG: 25 TABLET, FILM COATED ORAL at 17:34

## 2022-04-23 RX ADMIN — APIXABAN 5 MG: 5 TABLET, FILM COATED ORAL at 20:12

## 2022-04-23 RX ADMIN — CARVEDILOL 25 MG: 25 TABLET, FILM COATED ORAL at 09:10

## 2022-04-23 RX ADMIN — GUAIFENESIN 1200 MG: 600 TABLET, EXTENDED RELEASE ORAL at 09:11

## 2022-04-23 RX ADMIN — IPRATROPIUM BROMIDE AND ALBUTEROL SULFATE 3 ML: .5; 3 SOLUTION RESPIRATORY (INHALATION) at 10:18

## 2022-04-23 RX ADMIN — HYDROCODONE BITARTRATE AND ACETAMINOPHEN 1 TABLET: 7.5; 325 TABLET ORAL at 20:15

## 2022-04-23 RX ADMIN — IPRATROPIUM BROMIDE AND ALBUTEROL SULFATE 3 ML: .5; 3 SOLUTION RESPIRATORY (INHALATION) at 06:15

## 2022-04-23 RX ADMIN — Medication 10 ML: at 20:12

## 2022-04-23 RX ADMIN — Medication 250 MG: at 09:10

## 2022-04-23 RX ADMIN — ACETAMINOPHEN 650 MG: 325 TABLET ORAL at 05:51

## 2022-04-23 RX ADMIN — Medication 10 ML: at 09:13

## 2022-04-23 RX ADMIN — FERROUS SULFATE TAB 325 MG (65 MG ELEMENTAL FE) 325 MG: 325 (65 FE) TAB at 09:11

## 2022-04-23 RX ADMIN — GUAIFENESIN 1200 MG: 600 TABLET, EXTENDED RELEASE ORAL at 20:12

## 2022-04-23 RX ADMIN — BUMETANIDE 1 MG: 0.25 INJECTION INTRAMUSCULAR; INTRAVENOUS at 15:57

## 2022-04-23 RX ADMIN — TRAMADOL HYDROCHLORIDE 50 MG: 50 TABLET, COATED ORAL at 09:10

## 2022-04-23 RX ADMIN — MAGNESIUM GLUCONATE 500 MG ORAL TABLET 400 MG: 500 TABLET ORAL at 09:11

## 2022-04-23 RX ADMIN — LEVOTHYROXINE SODIUM 75 MCG: 75 TABLET ORAL at 05:51

## 2022-04-23 RX ADMIN — ASPIRIN 81 MG: 81 TABLET ORAL at 15:57

## 2022-04-23 RX ADMIN — DULOXETINE HYDROCHLORIDE 60 MG: 30 CAPSULE, DELAYED RELEASE ORAL at 09:11

## 2022-04-23 RX ADMIN — COLLAGENASE SANTYL 1 APPLICATION: 250 OINTMENT TOPICAL at 20:14

## 2022-04-24 ENCOUNTER — APPOINTMENT (OUTPATIENT)
Dept: CARDIOLOGY | Facility: HOSPITAL | Age: 67
End: 2022-04-24

## 2022-04-24 LAB
ALBUMIN SERPL-MCNC: 2.3 G/DL (ref 3.5–5.2)
ALBUMIN/GLOB SERPL: 0.5 G/DL
ALP SERPL-CCNC: 78 U/L (ref 39–117)
ALT SERPL W P-5'-P-CCNC: 8 U/L (ref 1–33)
ANION GAP SERPL CALCULATED.3IONS-SCNC: 9 MMOL/L (ref 5–15)
AST SERPL-CCNC: 14 U/L (ref 1–32)
BACTERIA SPEC AEROBE CULT: ABNORMAL
BILIRUB SERPL-MCNC: 0.3 MG/DL (ref 0–1.2)
BUN SERPL-MCNC: 19 MG/DL (ref 8–23)
BUN/CREAT SERPL: 23.2 (ref 7–25)
CALCIUM SPEC-SCNC: 8.7 MG/DL (ref 8.6–10.5)
CHLORIDE SERPL-SCNC: 99 MMOL/L (ref 98–107)
CO2 SERPL-SCNC: 27 MMOL/L (ref 22–29)
CREAT SERPL-MCNC: 0.82 MG/DL (ref 0.57–1)
DEPRECATED RDW RBC AUTO: 51.3 FL (ref 37–54)
EGFRCR SERPLBLD CKD-EPI 2021: 78 ML/MIN/1.73
ERYTHROCYTE [DISTWIDTH] IN BLOOD BY AUTOMATED COUNT: 16.1 % (ref 12.3–15.4)
GLOBULIN UR ELPH-MCNC: 4.4 GM/DL
GLUCOSE SERPL-MCNC: 88 MG/DL (ref 65–99)
HCT VFR BLD AUTO: 27.4 % (ref 34–46.6)
HGB BLD-MCNC: 8.8 G/DL (ref 12–15.9)
MCH RBC QN AUTO: 28.1 PG (ref 26.6–33)
MCHC RBC AUTO-ENTMCNC: 32.1 G/DL (ref 31.5–35.7)
MCV RBC AUTO: 87.5 FL (ref 79–97)
PLATELET # BLD AUTO: 234 10*3/MM3 (ref 140–450)
PMV BLD AUTO: 8.9 FL (ref 6–12)
POTASSIUM SERPL-SCNC: 3.9 MMOL/L (ref 3.5–5.2)
PROT SERPL-MCNC: 6.7 G/DL (ref 6–8.5)
RBC # BLD AUTO: 3.13 10*6/MM3 (ref 3.77–5.28)
SODIUM SERPL-SCNC: 135 MMOL/L (ref 136–145)
WBC NRBC COR # BLD: 5.26 10*3/MM3 (ref 3.4–10.8)

## 2022-04-24 PROCEDURE — 97162 PT EVAL MOD COMPLEX 30 MIN: CPT

## 2022-04-24 PROCEDURE — 93306 TTE W/DOPPLER COMPLETE: CPT

## 2022-04-24 PROCEDURE — 94799 UNLISTED PULMONARY SVC/PX: CPT

## 2022-04-24 PROCEDURE — 93306 TTE W/DOPPLER COMPLETE: CPT | Performed by: INTERNAL MEDICINE

## 2022-04-24 PROCEDURE — 80053 COMPREHEN METABOLIC PANEL: CPT | Performed by: INTERNAL MEDICINE

## 2022-04-24 PROCEDURE — 29580 STRAPPING UNNA BOOT: CPT

## 2022-04-24 PROCEDURE — 85027 COMPLETE CBC AUTOMATED: CPT | Performed by: INTERNAL MEDICINE

## 2022-04-24 PROCEDURE — 93356 MYOCRD STRAIN IMG SPCKL TRCK: CPT | Performed by: INTERNAL MEDICINE

## 2022-04-24 PROCEDURE — 93356 MYOCRD STRAIN IMG SPCKL TRCK: CPT

## 2022-04-24 RX ORDER — BUMETANIDE 0.25 MG/ML
1 INJECTION INTRAMUSCULAR; INTRAVENOUS ONCE
Status: COMPLETED | OUTPATIENT
Start: 2022-04-24 | End: 2022-04-24

## 2022-04-24 RX ADMIN — DULOXETINE HYDROCHLORIDE 30 MG: 30 CAPSULE, DELAYED RELEASE ORAL at 08:49

## 2022-04-24 RX ADMIN — GUAIFENESIN 1200 MG: 600 TABLET, EXTENDED RELEASE ORAL at 08:49

## 2022-04-24 RX ADMIN — HYDROCODONE BITARTRATE AND ACETAMINOPHEN 1 TABLET: 7.5; 325 TABLET ORAL at 20:11

## 2022-04-24 RX ADMIN — TRAMADOL HYDROCHLORIDE 50 MG: 50 TABLET, COATED ORAL at 08:52

## 2022-04-24 RX ADMIN — Medication 10 ML: at 08:51

## 2022-04-24 RX ADMIN — Medication 10 ML: at 20:10

## 2022-04-24 RX ADMIN — DOCUSATE SODIUM 50 MG AND SENNOSIDES 8.6 MG 2 TABLET: 8.6; 5 TABLET, FILM COATED ORAL at 08:49

## 2022-04-24 RX ADMIN — DICLOFENAC 2 G: 10 GEL TOPICAL at 18:34

## 2022-04-24 RX ADMIN — IPRATROPIUM BROMIDE AND ALBUTEROL SULFATE 3 ML: .5; 3 SOLUTION RESPIRATORY (INHALATION) at 19:39

## 2022-04-24 RX ADMIN — GUAIFENESIN 1200 MG: 600 TABLET, EXTENDED RELEASE ORAL at 20:11

## 2022-04-24 RX ADMIN — Medication 250 MG: at 08:49

## 2022-04-24 RX ADMIN — FERROUS SULFATE TAB 325 MG (65 MG ELEMENTAL FE) 325 MG: 325 (65 FE) TAB at 08:49

## 2022-04-24 RX ADMIN — APIXABAN 5 MG: 5 TABLET, FILM COATED ORAL at 20:11

## 2022-04-24 RX ADMIN — BUMETANIDE 1 MG: 0.25 INJECTION INTRAMUSCULAR; INTRAVENOUS at 14:28

## 2022-04-24 RX ADMIN — LEVOTHYROXINE SODIUM 75 MCG: 75 TABLET ORAL at 04:26

## 2022-04-24 RX ADMIN — CARVEDILOL 25 MG: 25 TABLET, FILM COATED ORAL at 08:49

## 2022-04-24 RX ADMIN — CARVEDILOL 25 MG: 25 TABLET, FILM COATED ORAL at 17:09

## 2022-04-24 RX ADMIN — DULOXETINE HYDROCHLORIDE 60 MG: 30 CAPSULE, DELAYED RELEASE ORAL at 08:49

## 2022-04-24 RX ADMIN — IPRATROPIUM BROMIDE AND ALBUTEROL SULFATE 3 ML: .5; 3 SOLUTION RESPIRATORY (INHALATION) at 06:18

## 2022-04-24 RX ADMIN — PANTOPRAZOLE SODIUM 40 MG: 40 TABLET, DELAYED RELEASE ORAL at 04:26

## 2022-04-24 RX ADMIN — FOLIC ACID 1 MG: 1 TABLET ORAL at 08:49

## 2022-04-24 RX ADMIN — DICLOFENAC 2 G: 10 GEL TOPICAL at 20:11

## 2022-04-24 RX ADMIN — ASPIRIN 81 MG: 81 TABLET ORAL at 08:49

## 2022-04-24 RX ADMIN — HYDROCODONE BITARTRATE AND ACETAMINOPHEN 1 TABLET: 7.5; 325 TABLET ORAL at 04:26

## 2022-04-24 RX ADMIN — DOCUSATE SODIUM 50 MG AND SENNOSIDES 8.6 MG 2 TABLET: 8.6; 5 TABLET, FILM COATED ORAL at 20:11

## 2022-04-24 RX ADMIN — MAGNESIUM GLUCONATE 500 MG ORAL TABLET 400 MG: 500 TABLET ORAL at 08:49

## 2022-04-24 RX ADMIN — IPRATROPIUM BROMIDE AND ALBUTEROL SULFATE 3 ML: .5; 3 SOLUTION RESPIRATORY (INHALATION) at 10:16

## 2022-04-24 RX ADMIN — APIXABAN 5 MG: 5 TABLET, FILM COATED ORAL at 08:49

## 2022-04-24 RX ADMIN — IPRATROPIUM BROMIDE AND ALBUTEROL SULFATE 3 ML: .5; 3 SOLUTION RESPIRATORY (INHALATION) at 14:04

## 2022-04-25 ENCOUNTER — APPOINTMENT (OUTPATIENT)
Dept: GENERAL RADIOLOGY | Facility: HOSPITAL | Age: 67
End: 2022-04-25

## 2022-04-25 LAB
ALBUMIN SERPL-MCNC: 2.6 G/DL (ref 3.5–5.2)
ALBUMIN/GLOB SERPL: 0.7 G/DL
ALP SERPL-CCNC: 83 U/L (ref 39–117)
ALT SERPL W P-5'-P-CCNC: 9 U/L (ref 1–33)
ANION GAP SERPL CALCULATED.3IONS-SCNC: 8 MMOL/L (ref 5–15)
AST SERPL-CCNC: 15 U/L (ref 1–32)
BH CV ECHO LEFT VENTRICLE GLOBAL LONGITUDINAL STRAIN: -10 %
BH CV ECHO MEAS - AO MAX PG: 14.4 MMHG
BH CV ECHO MEAS - AO MEAN PG: 8 MMHG
BH CV ECHO MEAS - AO ROOT DIAM: 2.6 CM
BH CV ECHO MEAS - AO V2 MAX: 190 CM/SEC
BH CV ECHO MEAS - AO V2 VTI: 44.7 CM
BH CV ECHO MEAS - AVA(I,D): 1.87 CM2
BH CV ECHO MEAS - EDV(CUBED): 115.5 ML
BH CV ECHO MEAS - EDV(MOD-SP4): 67.8 ML
BH CV ECHO MEAS - EF(MOD-SP4): 68.3 %
BH CV ECHO MEAS - ESV(CUBED): 22.7 ML
BH CV ECHO MEAS - ESV(MOD-SP4): 21.5 ML
BH CV ECHO MEAS - FS: 41.9 %
BH CV ECHO MEAS - IVS/LVPW: 1.05 CM
BH CV ECHO MEAS - IVSD: 1 CM
BH CV ECHO MEAS - LA DIMENSION: 3.2 CM
BH CV ECHO MEAS - LAT PEAK E' VEL: 8.9 CM/SEC
BH CV ECHO MEAS - LV DIASTOLIC VOL/BSA (35-75): 35.8 CM2
BH CV ECHO MEAS - LV MASS(C)D: 169 GRAMS
BH CV ECHO MEAS - LV MAX PG: 7.4 MMHG
BH CV ECHO MEAS - LV MEAN PG: 4 MMHG
BH CV ECHO MEAS - LV SYSTOLIC VOL/BSA (12-30): 11.3 CM2
BH CV ECHO MEAS - LV V1 MAX: 136 CM/SEC
BH CV ECHO MEAS - LV V1 VTI: 32.9 CM
BH CV ECHO MEAS - LVIDD: 4.9 CM
BH CV ECHO MEAS - LVIDS: 2.8 CM
BH CV ECHO MEAS - LVOT AREA: 2.5 CM2
BH CV ECHO MEAS - LVOT DIAM: 1.8 CM
BH CV ECHO MEAS - LVPWD: 0.96 CM
BH CV ECHO MEAS - MED PEAK E' VEL: 7.3 CM/SEC
BH CV ECHO MEAS - MV A MAX VEL: 118 CM/SEC
BH CV ECHO MEAS - MV DEC TIME: 0.22 MSEC
BH CV ECHO MEAS - MV E MAX VEL: 111 CM/SEC
BH CV ECHO MEAS - MV E/A: 0.94
BH CV ECHO MEAS - RAP SYSTOLE: 5 MMHG
BH CV ECHO MEAS - RVSP: 29.2 MMHG
BH CV ECHO MEAS - SI(MOD-SP4): 24.4 ML/M2
BH CV ECHO MEAS - SV(LVOT): 83.7 ML
BH CV ECHO MEAS - SV(MOD-SP4): 46.3 ML
BH CV ECHO MEAS - TR MAX PG: 24.2 MMHG
BH CV ECHO MEAS - TR MAX VEL: 246 CM/SEC
BH CV ECHO MEASUREMENTS AVERAGE E/E' RATIO: 13.7
BILIRUB SERPL-MCNC: 0.3 MG/DL (ref 0–1.2)
BUN SERPL-MCNC: 21 MG/DL (ref 8–23)
BUN/CREAT SERPL: 24.4 (ref 7–25)
CALCIUM SPEC-SCNC: 8.9 MG/DL (ref 8.6–10.5)
CHLORIDE SERPL-SCNC: 102 MMOL/L (ref 98–107)
CO2 SERPL-SCNC: 29 MMOL/L (ref 22–29)
CREAT SERPL-MCNC: 0.86 MG/DL (ref 0.57–1)
DEPRECATED RDW RBC AUTO: 52 FL (ref 37–54)
EGFRCR SERPLBLD CKD-EPI 2021: 73.7 ML/MIN/1.73
ERYTHROCYTE [DISTWIDTH] IN BLOOD BY AUTOMATED COUNT: 16 % (ref 12.3–15.4)
GLOBULIN UR ELPH-MCNC: 3.7 GM/DL
GLUCOSE SERPL-MCNC: 92 MG/DL (ref 65–99)
HCT VFR BLD AUTO: 26.7 % (ref 34–46.6)
HGB BLD-MCNC: 8.2 G/DL (ref 12–15.9)
LEFT ATRIUM VOLUME INDEX: 42.2 ML/M2
LEFT ATRIUM VOLUME: 79.7 CM3
MAXIMAL PREDICTED HEART RATE: 152 BPM
MCH RBC QN AUTO: 27.3 PG (ref 26.6–33)
MCHC RBC AUTO-ENTMCNC: 30.7 G/DL (ref 31.5–35.7)
MCV RBC AUTO: 89 FL (ref 79–97)
PLATELET # BLD AUTO: 239 10*3/MM3 (ref 140–450)
PMV BLD AUTO: 9.1 FL (ref 6–12)
POTASSIUM SERPL-SCNC: 4 MMOL/L (ref 3.5–5.2)
PROT SERPL-MCNC: 6.3 G/DL (ref 6–8.5)
RBC # BLD AUTO: 3 10*6/MM3 (ref 3.77–5.28)
SODIUM SERPL-SCNC: 139 MMOL/L (ref 136–145)
STRESS TARGET HR: 129 BPM
WBC NRBC COR # BLD: 4.45 10*3/MM3 (ref 3.4–10.8)

## 2022-04-25 PROCEDURE — 94799 UNLISTED PULMONARY SVC/PX: CPT

## 2022-04-25 PROCEDURE — 71046 X-RAY EXAM CHEST 2 VIEWS: CPT

## 2022-04-25 PROCEDURE — 97530 THERAPEUTIC ACTIVITIES: CPT

## 2022-04-25 PROCEDURE — 99222 1ST HOSP IP/OBS MODERATE 55: CPT | Performed by: INTERNAL MEDICINE

## 2022-04-25 PROCEDURE — 80053 COMPREHEN METABOLIC PANEL: CPT | Performed by: INTERNAL MEDICINE

## 2022-04-25 PROCEDURE — 97110 THERAPEUTIC EXERCISES: CPT

## 2022-04-25 PROCEDURE — 85027 COMPLETE CBC AUTOMATED: CPT | Performed by: INTERNAL MEDICINE

## 2022-04-25 PROCEDURE — 97165 OT EVAL LOW COMPLEX 30 MIN: CPT | Performed by: OCCUPATIONAL THERAPIST

## 2022-04-25 RX ORDER — ASCORBIC ACID 500 MG
500 TABLET ORAL DAILY
Status: DISCONTINUED | OUTPATIENT
Start: 2022-04-25 | End: 2022-04-26 | Stop reason: HOSPADM

## 2022-04-25 RX ADMIN — CARVEDILOL 25 MG: 25 TABLET, FILM COATED ORAL at 09:27

## 2022-04-25 RX ADMIN — Medication 10 ML: at 21:01

## 2022-04-25 RX ADMIN — GUAIFENESIN 1200 MG: 600 TABLET, EXTENDED RELEASE ORAL at 21:01

## 2022-04-25 RX ADMIN — LEVOTHYROXINE SODIUM 75 MCG: 75 TABLET ORAL at 05:20

## 2022-04-25 RX ADMIN — OXYCODONE HYDROCHLORIDE AND ACETAMINOPHEN 500 MG: 500 TABLET ORAL at 11:30

## 2022-04-25 RX ADMIN — IPRATROPIUM BROMIDE AND ALBUTEROL SULFATE 3 ML: .5; 3 SOLUTION RESPIRATORY (INHALATION) at 21:20

## 2022-04-25 RX ADMIN — ASPIRIN 81 MG: 81 TABLET ORAL at 09:27

## 2022-04-25 RX ADMIN — IPRATROPIUM BROMIDE AND ALBUTEROL SULFATE 3 ML: .5; 3 SOLUTION RESPIRATORY (INHALATION) at 10:20

## 2022-04-25 RX ADMIN — FERROUS SULFATE TAB 325 MG (65 MG ELEMENTAL FE) 325 MG: 325 (65 FE) TAB at 09:28

## 2022-04-25 RX ADMIN — COLLAGENASE SANTYL 1 APPLICATION: 250 OINTMENT TOPICAL at 21:05

## 2022-04-25 RX ADMIN — MAGNESIUM GLUCONATE 500 MG ORAL TABLET 400 MG: 500 TABLET ORAL at 09:26

## 2022-04-25 RX ADMIN — Medication 10 ML: at 09:29

## 2022-04-25 RX ADMIN — TRAMADOL HYDROCHLORIDE 50 MG: 50 TABLET, COATED ORAL at 21:01

## 2022-04-25 RX ADMIN — PANTOPRAZOLE SODIUM 40 MG: 40 TABLET, DELAYED RELEASE ORAL at 05:21

## 2022-04-25 RX ADMIN — DOCUSATE SODIUM 50 MG AND SENNOSIDES 8.6 MG 2 TABLET: 8.6; 5 TABLET, FILM COATED ORAL at 09:25

## 2022-04-25 RX ADMIN — DULOXETINE HYDROCHLORIDE 60 MG: 30 CAPSULE, DELAYED RELEASE ORAL at 09:26

## 2022-04-25 RX ADMIN — IPRATROPIUM BROMIDE AND ALBUTEROL SULFATE 3 ML: .5; 3 SOLUTION RESPIRATORY (INHALATION) at 06:17

## 2022-04-25 RX ADMIN — DICLOFENAC 2 G: 10 GEL TOPICAL at 11:30

## 2022-04-25 RX ADMIN — DICLOFENAC 2 G: 10 GEL TOPICAL at 17:20

## 2022-04-25 RX ADMIN — Medication 250 MG: at 09:29

## 2022-04-25 RX ADMIN — FOLIC ACID 1 MG: 1 TABLET ORAL at 09:26

## 2022-04-25 RX ADMIN — TRAMADOL HYDROCHLORIDE 50 MG: 50 TABLET, COATED ORAL at 09:28

## 2022-04-25 RX ADMIN — APIXABAN 5 MG: 5 TABLET, FILM COATED ORAL at 09:28

## 2022-04-25 RX ADMIN — DOCUSATE SODIUM 50 MG AND SENNOSIDES 8.6 MG 2 TABLET: 8.6; 5 TABLET, FILM COATED ORAL at 21:01

## 2022-04-25 RX ADMIN — APIXABAN 5 MG: 5 TABLET, FILM COATED ORAL at 21:01

## 2022-04-25 RX ADMIN — GUAIFENESIN 1200 MG: 600 TABLET, EXTENDED RELEASE ORAL at 09:26

## 2022-04-25 RX ADMIN — CARVEDILOL 25 MG: 25 TABLET, FILM COATED ORAL at 17:16

## 2022-04-26 VITALS
RESPIRATION RATE: 18 BRPM | WEIGHT: 183.86 LBS | HEIGHT: 66 IN | HEART RATE: 60 BPM | SYSTOLIC BLOOD PRESSURE: 137 MMHG | TEMPERATURE: 97.4 F | OXYGEN SATURATION: 100 % | DIASTOLIC BLOOD PRESSURE: 58 MMHG | BODY MASS INDEX: 29.55 KG/M2

## 2022-04-26 LAB
CYTO UR: NORMAL
LAB AP CASE REPORT: NORMAL
LAB AP CLINICAL INFORMATION: NORMAL
PATH REPORT.FINAL DX SPEC: NORMAL
PATH REPORT.GROSS SPEC: NORMAL
TRIGL FLD-MCNC: 25 MG/DL

## 2022-04-26 PROCEDURE — 94664 DEMO&/EVAL PT USE INHALER: CPT

## 2022-04-26 PROCEDURE — 94799 UNLISTED PULMONARY SVC/PX: CPT

## 2022-04-26 PROCEDURE — 99232 SBSQ HOSP IP/OBS MODERATE 35: CPT | Performed by: INTERNAL MEDICINE

## 2022-04-26 RX ORDER — TRAMADOL HYDROCHLORIDE 50 MG/1
50 TABLET ORAL 2 TIMES DAILY
Qty: 6 TABLET | Refills: 0 | Status: SHIPPED | OUTPATIENT
Start: 2022-04-26 | End: 2022-04-29

## 2022-04-26 RX ORDER — IPRATROPIUM BROMIDE AND ALBUTEROL SULFATE 2.5; .5 MG/3ML; MG/3ML
3 SOLUTION RESPIRATORY (INHALATION)
Qty: 360 ML
Start: 2022-04-26 | End: 2022-04-26 | Stop reason: SDUPTHER

## 2022-04-26 RX ORDER — CEFDINIR 300 MG/1
300 CAPSULE ORAL 2 TIMES DAILY
Qty: 6 CAPSULE | Refills: 0
Start: 2022-04-26 | End: 2022-04-29

## 2022-04-26 RX ORDER — HYDROCODONE BITARTRATE AND ACETAMINOPHEN 7.5; 325 MG/1; MG/1
1 TABLET ORAL EVERY 8 HOURS PRN
Qty: 9 TABLET | Refills: 0 | Status: SHIPPED | OUTPATIENT
Start: 2022-04-26

## 2022-04-26 RX ORDER — IPRATROPIUM BROMIDE AND ALBUTEROL SULFATE 2.5; .5 MG/3ML; MG/3ML
3 SOLUTION RESPIRATORY (INHALATION) 4 TIMES DAILY PRN
Qty: 360 ML
Start: 2022-04-26 | End: 2022-05-17

## 2022-04-26 RX ADMIN — IPRATROPIUM BROMIDE AND ALBUTEROL SULFATE 3 ML: .5; 3 SOLUTION RESPIRATORY (INHALATION) at 09:56

## 2022-04-26 RX ADMIN — CARVEDILOL 25 MG: 25 TABLET, FILM COATED ORAL at 09:44

## 2022-04-26 RX ADMIN — TRAMADOL HYDROCHLORIDE 50 MG: 50 TABLET, COATED ORAL at 09:44

## 2022-04-26 RX ADMIN — ACETAMINOPHEN 650 MG: 325 TABLET ORAL at 01:02

## 2022-04-26 RX ADMIN — PANTOPRAZOLE SODIUM 40 MG: 40 TABLET, DELAYED RELEASE ORAL at 05:07

## 2022-04-26 RX ADMIN — GUAIFENESIN 1200 MG: 600 TABLET, EXTENDED RELEASE ORAL at 09:44

## 2022-04-26 RX ADMIN — APIXABAN 5 MG: 5 TABLET, FILM COATED ORAL at 09:44

## 2022-04-26 RX ADMIN — MAGNESIUM GLUCONATE 500 MG ORAL TABLET 400 MG: 500 TABLET ORAL at 09:43

## 2022-04-26 RX ADMIN — OXYCODONE HYDROCHLORIDE AND ACETAMINOPHEN 500 MG: 500 TABLET ORAL at 09:44

## 2022-04-26 RX ADMIN — IPRATROPIUM BROMIDE AND ALBUTEROL SULFATE 3 ML: .5; 3 SOLUTION RESPIRATORY (INHALATION) at 06:14

## 2022-04-26 RX ADMIN — Medication 250 MG: at 09:45

## 2022-04-26 RX ADMIN — ASPIRIN 81 MG: 81 TABLET ORAL at 09:44

## 2022-04-26 RX ADMIN — LEVOTHYROXINE SODIUM 75 MCG: 75 TABLET ORAL at 05:07

## 2022-04-26 RX ADMIN — DULOXETINE HYDROCHLORIDE 60 MG: 30 CAPSULE, DELAYED RELEASE ORAL at 09:58

## 2022-04-26 RX ADMIN — FERROUS SULFATE TAB 325 MG (65 MG ELEMENTAL FE) 325 MG: 325 (65 FE) TAB at 09:44

## 2022-04-26 RX ADMIN — FOLIC ACID 1 MG: 1 TABLET ORAL at 09:43

## 2022-04-26 RX ADMIN — DOCUSATE SODIUM 50 MG AND SENNOSIDES 8.6 MG 2 TABLET: 8.6; 5 TABLET, FILM COATED ORAL at 09:45

## 2022-04-28 LAB
BACTERIA FLD CULT: NORMAL
CHOLEST FLD-MCNC: 75 MG/DL
GRAM STN SPEC: NORMAL
GRAM STN SPEC: NORMAL

## 2022-04-29 ENCOUNTER — OFFICE VISIT (OUTPATIENT)
Dept: WOUND CARE | Facility: HOSPITAL | Age: 67
End: 2022-04-29

## 2022-04-29 DIAGNOSIS — S22.080S WEDGE COMPRESSION FRACTURE OF T11-T12 VERTEBRA, SEQUELA: ICD-10-CM

## 2022-04-29 DIAGNOSIS — R26.89 OTHER ABNORMALITIES OF GAIT AND MOBILITY: ICD-10-CM

## 2022-04-29 DIAGNOSIS — L89.513 PRESSURE ULCER OF RIGHT ANKLE, STAGE 3: ICD-10-CM

## 2022-04-29 LAB — BACTERIA SPEC ANAEROBE CULT: NORMAL

## 2022-04-29 PROCEDURE — 11042 DBRDMT SUBQ TIS 1ST 20SQCM/<: CPT | Performed by: PODIATRIST

## 2022-05-06 ENCOUNTER — OFFICE VISIT (OUTPATIENT)
Dept: WOUND CARE | Facility: HOSPITAL | Age: 67
End: 2022-05-06

## 2022-05-06 DIAGNOSIS — Z79.01 LONG TERM (CURRENT) USE OF ANTICOAGULANTS: ICD-10-CM

## 2022-05-06 DIAGNOSIS — L89.513 PRESSURE ULCER OF RIGHT ANKLE, STAGE 3: ICD-10-CM

## 2022-05-06 DIAGNOSIS — R26.89 OTHER ABNORMALITIES OF GAIT AND MOBILITY: ICD-10-CM

## 2022-05-06 DIAGNOSIS — S22.080S WEDGE COMPRESSION FRACTURE OF T11-T12 VERTEBRA, SEQUELA: ICD-10-CM

## 2022-05-06 PROCEDURE — 11042 DBRDMT SUBQ TIS 1ST 20SQCM/<: CPT | Performed by: PODIATRIST

## 2022-05-06 PROCEDURE — 97597 DBRDMT OPN WND 1ST 20 CM/<: CPT | Performed by: PODIATRIST

## 2022-05-09 ENCOUNTER — OFFICE VISIT (OUTPATIENT)
Dept: CARDIOLOGY | Facility: CLINIC | Age: 67
End: 2022-05-09

## 2022-05-09 ENCOUNTER — CLINICAL SUPPORT NO REQUIREMENTS (OUTPATIENT)
Dept: CARDIOLOGY | Facility: CLINIC | Age: 67
End: 2022-05-09

## 2022-05-09 VITALS
SYSTOLIC BLOOD PRESSURE: 112 MMHG | HEART RATE: 65 BPM | OXYGEN SATURATION: 96 % | WEIGHT: 174 LBS | DIASTOLIC BLOOD PRESSURE: 58 MMHG | BODY MASS INDEX: 27.97 KG/M2 | HEIGHT: 66 IN

## 2022-05-09 DIAGNOSIS — I49.5 SICK SINUS SYNDROME: ICD-10-CM

## 2022-05-09 DIAGNOSIS — E78.2 MIXED HYPERLIPIDEMIA: ICD-10-CM

## 2022-05-09 DIAGNOSIS — I10 ESSENTIAL HYPERTENSION: ICD-10-CM

## 2022-05-09 DIAGNOSIS — I25.10 CORONARY ARTERY DISEASE INVOLVING NATIVE CORONARY ARTERY OF NATIVE HEART WITHOUT ANGINA PECTORIS: Primary | ICD-10-CM

## 2022-05-09 PROCEDURE — 93288 INTERROG EVL PM/LDLS PM IP: CPT | Performed by: INTERNAL MEDICINE

## 2022-05-09 PROCEDURE — 99214 OFFICE O/P EST MOD 30 MIN: CPT | Performed by: NURSE PRACTITIONER

## 2022-05-09 RX ORDER — FUROSEMIDE 40 MG/1
40 TABLET ORAL DAILY
COMMUNITY
End: 2023-02-03 | Stop reason: SDUPTHER

## 2022-05-09 RX ORDER — TRAMADOL HYDROCHLORIDE 50 MG/1
100 TABLET ORAL 3 TIMES DAILY PRN
COMMUNITY
End: 2023-01-11

## 2022-05-12 ENCOUNTER — OFFICE VISIT (OUTPATIENT)
Dept: PODIATRY | Facility: CLINIC | Age: 67
End: 2022-05-12

## 2022-05-12 VITALS — BODY MASS INDEX: 27.97 KG/M2 | OXYGEN SATURATION: 95 % | HEIGHT: 66 IN | WEIGHT: 174 LBS | HEART RATE: 62 BPM

## 2022-05-12 DIAGNOSIS — Z99.3 WHEELCHAIR DEPENDENT: ICD-10-CM

## 2022-05-12 DIAGNOSIS — M20.11 HALLUX VALGUS, RIGHT: ICD-10-CM

## 2022-05-12 DIAGNOSIS — L89.513 PRESSURE ULCER OF RIGHT ANKLE, STAGE 3: ICD-10-CM

## 2022-05-12 DIAGNOSIS — L60.2 THICKENED NAIL: Primary | ICD-10-CM

## 2022-05-12 DIAGNOSIS — Z79.01 ANTICOAGULANT LONG-TERM USE: ICD-10-CM

## 2022-05-12 PROCEDURE — 99212 OFFICE O/P EST SF 10 MIN: CPT | Performed by: PODIATRIST

## 2022-05-12 PROCEDURE — 11721 DEBRIDE NAIL 6 OR MORE: CPT | Performed by: PODIATRIST

## 2022-05-12 NOTE — PROGRESS NOTES
Chief Complaint      [facility] - PODIATRY    Today's Date: 05/12/2022     Patient Name: Tawny Shin  MRN: [id number]  CSN: [id number]  PCP: Leta Julian DO  Referring Provider: No ref. provider found    SUBJECTIVE     Chief Complaint   Patient presents with   • Establish Care     Leta Julian DO 03/28/2022 REFERRING MARTHA  NON DIABETIC FOOT CARE- pt states feet doing ok, here for nail care- pt pain 8/10 at worst- pt presents in wheel chair, both feet/ankles wrapped to toes, long thick nails, swelling to feet     HPI: Tawny Shin, a 66 y.o.female, comes to clinic as a(n) new patient complaining of thickened, irregular toenails of both feet. Patient has h/o arthritis, asthma, bunions, CA, Heart block, Charcot joint of left foot, DVT, COPD, CAD, HLD, HTN, Hypothyroid, MI, pacemaker. Patient presents for nail care of thickened, irregular toenails of both feet. Patient has ulceration of right ankle that is being followed in wound care center. Has Unna boots in place. Resides at [facility]. Relies on wheelchair for mobilization. Admits pain at 8/10 level and described as stabbing, aching and sharp. Relates previous treatment(s) including treatment in [facility]. Patient takes Eliquis daily. Denies any constitutional symptoms. No other pedal complaints at this time.    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   • Elevated cholesterol    •  "Eustachian tube dysfunction    • Heart disease    • Herpes simplex    • History of transfusion    • Hyperlipidemia    • Hypertension    • Hypothyroidism 12/23/2020   • Intrinsic asthma 12/23/2020   • Knee dislocation    • Labral tear of right hip joint    • Laryngitis sicca    • Laryngitis, chronic    • Left carotid bruit 3/9/2016   • MI (myocardial infarction) ([facility])    • Myalgia due to statin 6/25/2019   • Open wound of right hip 9/14/2021   • Osteomyelitis of right femur ([facility]) 7/6/2021   • Otorrhea    • Pacemaker 11/17/2016   • Primary osteoarthritis of left knee 12/23/2020   • Psoriasis vulgaris 12/23/2020   • S/P coronary artery stent placement 3/9/2016   • Sensorineural hearing loss    • Seropositive rheumatoid arthritis of multiple sites ([facility]) 12/27/2019    Overview:  -myochrysine '93-'96 -methotrexate '96--->11/98;r/s  restarted 2/99--> 8/14 (anemia) -sulfasalazine- not effective -penicillamine 6/98-->10/98; no effect -leflunomide 11/98--> - Humira '13-->didn't take - Enbrel 12/14-->3/15- no effect!   Last Assessment & Plan:  - \"aching all over\" because she had to be off her anti-rheumatic drugs for 2 weeks in preparation for her R knee surgery - he   • Sick sinus syndrome ([facility]) 12/27/2019   • Sjogren's disease ([facility])    • Spondylolisthesis of lumbar region 1/17/2018   • Syncope, recurrent 2/8/2021     Past Surgical History:   Procedure Laterality Date   • A-V CARDIAC PACEMAKER INSERTION  2016   • ATRIAL CARDIAC PACEMAKER INSERTION     • CARDIAC CATHETERIZATION     • CATARACT EXTRACTION     • CERVICAL CORPECTOMY N/A 3/3/2021    Procedure: CERVICAL 6 CORPECTOMY WITH TITANIUM CAGE WITH NEURO MONITORING;  Surgeon: Bandar Shea MD;  Location: [facility];  Service: Neurosurgery;  Laterality: N/A;   • COLONOSCOPY  11/08/2011    One fold in the ascending colon which showed ulcer otherwise normal exam   • COLONOSCOPY  11/12/2004    Normal exam repeat in five years   • CORONARY ANGIOPLASTY WITH STENT " PLACEMENT      X 2; 2013 & 2014   • ENDOSCOPY  07/10/2014    Normal exam   • FLAP LEG Right 9/14/2021    Procedure: RIGHT GLUTEAL FASCIOCUTANEOUS ADVANCEMENT FLAP AND RIGHT TENSOR FASCIAL JESSICA FLAP;  Surgeon: Amadeo Turner MD;  Location:  [facility];  Service: Plastics;  Laterality: Right;   • HIP ABDUCTION TENOTOMY BILATERAL Right 1/14/2021    Procedure: RIGHT HIP GLUTEUS MEDLUS / MINIMUS REPAIR, POSSIBLE ACHILLES ALLOGRAFT;  Surgeon: Nino Carlson MD;  Location:  [facility];  Service: Orthopedics;  Laterality: Right;   • INCISION AND DRAINAGE HIP Right 2/9/2021    Procedure: HIP INCISION AND DRAINAGE;  Surgeon: Nino Carlson MD;  Location:  [facility];  Service: Orthopedics;  Laterality: Right;   • INCISION AND DRAINAGE LEG Right 10/24/2021    Procedure: INCISION AND DRAINAGE LOWER EXTREMITY;  Surgeon: Amadeo Turner MD;  Location:  [facility];  Service: Plastics;  Laterality: Right;   • INCISION AND DRAINAGE OF WOUND Right 7/8/2021    Procedure: INCISION AND DRAINAGE WOUND RIGHT HIP;  Surgeon: James Huntley MD;  Location:  [facility];  Service: Orthopedics;  Laterality: Right;   • JOINT REPLACEMENT     • KYPHOPLASTY WITH BIOPSY Bilateral 10/26/2021    Procedure: THOARCIC 12 KYPHOPLASTY WITH BIOPSY;  Surgeon: Bandar Shea MD;  Location:  [facility];  Service: Neurosurgery;  Laterality: Bilateral;   • LEG DEBRIDEMENT Right 9/14/2021    Procedure: DEBRIDEMENT OF RIGHT HIP WOUND, RIGHT GLUTEAL FASCIOCUTANEOUS ADVANCEMENT FLAP AND RIGHT TENSOR FASCIAL JESSICA FLAP;  Surgeon: Amadeo Turner MD;  Location:  [facility];  Service: Plastics;  Laterality: Right;   • LUMBAR DISCECTOMY Right 3/23/2021    Procedure: LUMBAR DISCECTOMY MICRO, Lumbar 1/2 right;  Surgeon: Bandar Shea MD;  Location:  [facility];  Service: Neurosurgery;  Laterality: Right;   • LUMBAR FUSION N/A 1/19/2018    Procedure: L3-4,L4-5 DECOMPRESSION, POSTERIOR SPINAL FUSION WITH INSTRUMENTATION;  Surgeon: Fortino Oropeza MD;   Location:  [facility];  Service:    • LUMBAR LAMINECTOMY WITH FUSION Left 1/17/2018    Procedure: LEFT L3-4 L4-5 LATERAL LUMBAR INTERBODY FUSION;  Surgeon: Fortino Oropeza MD;  Location:  [facility];  Service:    • MYRINGOTOMY W/ TUBES  09/04/2014    TUBES NO LONGER IN PLACE   • OTHER SURGICAL HISTORY      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 so hardware was removed and spacers were placed   • REPLACEMENT TOTAL KNEE Right      Family History   Problem Relation Age of Onset   • Cancer Mother    • Heart disease Father      Social History     Socioeconomic History   • Marital status:    Tobacco Use   • Smoking status: Never Smoker   • Smokeless tobacco: Never Used   Vaping Use   • Vaping Use: Never used   Substance and Sexual Activity   • Alcohol use: No   • Drug use: Never   • Sexual activity: Defer     Allergies   Allergen Reactions   • Atorvastatin Other (See Comments)     LEG CRAMPS     • Amoxicillin Rash   • Escitalopram Rash   • Nabumetone Rash   • Niacin Er Rash   • Penicillins Rash   • Simvastatin Rash     Current Outpatient Medications   Medication Sig Dispense Refill   • apixaban (ELIQUIS) 5 MG tablet tablet Take 1 tablet by mouth 2 (Two) Times a Day. 60 tablet    • ascorbic acid (VITAMIN C) 500 MG tablet Take 500 mg by mouth Daily.     • aspirin 81 MG EC tablet Take 81 mg by mouth Daily.     • carvedilol (COREG) 25 MG tablet Take 1 tablet by mouth 2 (Two) Times a Day With Meals. 180 tablet 3   • collagenase 250 UNIT/GM ointment Apply 1 application topically to the appropriate area as directed Daily.     • Diclofenac Sodium (VOLTAREN) 1 % gel gel Apply 4 g topically to the appropriate area as directed 4 (Four) Times a Day As Needed.     • docusate sodium 100 MG capsule Take 1 capsule by mouth 2 (Two) Times a Day.     • DULoxetine (CYMBALTA) 60 MG capsule Take 1 capsule by mouth Daily.     • fluticasone (FLONASE) 50 MCG/ACT nasal spray 1 spray into the nostril(s) as directed by provider Daily.     • furosemide  (LASIX) 40 MG tablet Take 40 mg by mouth Daily.     • HYDROcodone-acetaminophen (NORCO) 7.5-325 MG per tablet Take 1 tablet by mouth Every 8 (Eight) Hours As Needed for Moderate Pain . 9 tablet 0   • ipratropium-albuterol (DUO-NEB) 0.5-2.5 mg/3 ml nebulizer Take 3 mL by nebulization 4 (Four) Times a Day As Needed for Wheezing. 360 mL    • isosorbide mononitrate (IMDUR) 60 MG 24 hr tablet Take 1 tablet by mouth 2 (Two) Times a Day.     • levothyroxine (SYNTHROID, LEVOTHROID) 75 MCG tablet Take 1 tablet by mouth Daily. 90 tablet 3   • magnesium oxide (MAG-OX) 400 MG tablet Take 400 mg by mouth Daily.     • multivitamin with minerals tablet tablet Take 1 tablet by mouth Daily.     • nitroglycerin (Nitrostat) 0.4 MG SL tablet Place 1 tablet under the tongue Every 5 (Five) Minutes As Needed for Chest Pain. Take no more than 3 doses in 15 minutes.  12   • saccharomyces boulardii (Florastor) 250 MG capsule Take 1 capsule by mouth Daily. 90 capsule 3   • sennosides-docusate (PERICOLACE) 8.6-50 MG per tablet Take 2 tablets by mouth 2 (Two) Times a Day.     • sucralfate (CARAFATE) 1 g tablet Take 1 g by mouth 4 (Four) Times a Day Before Meals & at Bedtime.     • Thiamine HCl (VITAMIN B-1 PO) Take 100 mg by mouth Daily.     • traMADol (ULTRAM) 50 MG tablet Take 100 mg by mouth 3 (Three) Times a Day As Needed for Moderate Pain .       No current facility-administered medications for this visit.     Review of Systems   Constitutional: Negative for chills and fever.   HENT: Negative for congestion.    Respiratory: Negative for shortness of breath.    Cardiovascular: Positive for leg swelling. Negative for chest pain.   Gastrointestinal: Negative for constipation, diarrhea, nausea and vomiting.   Musculoskeletal: Positive for arthralgias, back pain and gait problem.        Foot pain   Skin: Positive for wound.   Neurological: Negative for numbness.       OBJECTIVE     Vitals:    05/12/22 1358   Pulse: 62   SpO2: 95%       PHYSICAL  EXAM  GEN:   Accompanied by none.     Foot/Ankle Exam:       General:   Appearance: appears stated age and healthy    Orientation: AAOx3    Affect: appropriate    Assistance: wheelchair    Assistance comment:  Motorized  Shoe Gear:  Socks    VASCULAR      Right Foot Vascularity   Dorsalis pedis:  2+  Posterior tibial:  2+  Skin Temperature: warm    Edema Grading:  Non-pitting and trace  CFT:  3  Pedal Hair Growth:  Present  Varicosities: none       Left Foot Vascularity   Dorsalis pedis:  2+  Posterior tibial:  2+  Skin Temperature: warm    Edema Grading:  Non-pitting and trace  CFT:  3  Pedal Hair Growth:  Present  Varicosities: none        NEUROLOGIC     Right Foot Neurologic   Light touch sensation:  Diminished  Vibratory sensation:  Normal  Hot/Cold sensation: normal    Protective Sensation using [place]Ian Monofilament:  10     Left Foot Neurologic   Light touch sensation:  Diminished  Vibratory sensation:  Normal  Hot/cold sensation: normal    Protective Sensation using [place]-Ian Monofilament:  10     MUSCULOSKELETAL      Right Foot Musculoskeletal   Ecchymosis:  None  Tenderness: none    Arch:  Normal  Hammertoe:  Second toe, third toe, fourth toe and fifth toe  Hallux valgus: Yes       Left Foot Musculoskeletal   Ecchymosis:  None  Tenderness: none    Arch:  Normal  Hammertoe:  Second toe, third toe, fourth toe and fifth toe     RANGE OF MOTION      Right Foot Range of Motion   Foot and ankle ROM within normal limits       Left Foot Range of Motion   Foot and ankle ROM within normal limits       DERMATOLOGIC     Right Foot Dermatologic   Skin: drainage and ulcer    Skin: no right foot cellulitis and no right foot redness    Nails: onychomycosis, abnormally thick and dystrophic nails       Left Foot Dermatologic   Skin: skin intact    Nails: onychomycosis, abnormally thick and dystrophic nails        RADIOLOGY/NUCLEAR:  Adult Transthoracic Echo Complete W/ Cont if Necessary Per Protocol    Result  Date: 4/25/2022  Narrative: · Left ventricular ejection fraction appears to be 66 - 70%. · Left ventricular diastolic function was normal. · Abnormal global longitudinal LV strain (GLS) = -10.0% · Left atrial volume is mildly increased. · Estimated right ventricular systolic pressure from tricuspid regurgitation is normal (<35 mmHg).      XR Chest 1 View    Result Date: 4/23/2022  Narrative: EXAMINATION: Chest 1 view 4/23/2022  HISTORY: Postthoracentesis.  FINDINGS: Today's exam is compared to previous study of one day earlier. The left-sided pleural effusion has shown significant diminishment in size post thoracentesis. There is no pneumothorax. There is a granuloma within the right lung apex. The lungs are otherwise clear.      Impression: 1.. The patient's undergone left-sided thoracentesis with evacuation of the majority of the left-sided effusion. There is no pneumothorax. This report was finalized on 04/23/2022 11:57 by Dr. Neymar Calderon MD.    XR Chest 1 View    Result Date: 4/22/2022  Narrative: EXAMINATION: XR CHEST 1 VW- 4/22/2022 12:29 PM CDT  HISTORY: Chest Pain protocol.  REPORT: A frontal view of the chest was obtained.  COMPARISON: Chest x-ray 7/6/2021.  The right lung is clear, there is consolidation of the left lung base with a small to moderate left pleural effusion. There is borderline cardiomegaly. No evidence of overt CHF is identified. A left subclavian pacemaker is present as before. There is previous ACDF. Interval kyphoplasty is noted at T12.      Impression: Left lung base consolidation with a small to moderate pleural effusion. This may represent left lower lobe pneumonia, clinical correlation is recommended. This report was finalized on 04/22/2022 12:31 by Dr. Jer Townsend MD.    CT Angiogram Chest    Result Date: 4/22/2022  Narrative: CT ANGIOGRAM CHEST- 4/22/2022 12:31 PM CDT  HISTORY: PE suspected, low/intermediate prob, neg D-dimer, left-sided chest pain  COMPARISON: 6/22/2021   DOSE LENGTH PRODUCT: 233 mGy cm. Automated exposure control was also utilized to decrease patient radiation dose.  TECHNIQUE: Axial images the chest are obtained following IV contrast. 2-D and maximal intensity projection images are reconstructed and reviewed  FINDINGS:  There are no pulmonary emboli identified. Thoracic aorta normal in caliber with no aneurysm or dissection. Scattered coronary calcification. Cardiomegaly. Left subclavian cardiac pacer device.  Images the upper abdomen do not include visualization adrenal glands. No discrete upper abdominal pathology identified.  There is a new small to moderate left pleural effusion with collapsed and partially trapped left lower lung parenchyma associated with the pleural fluid only trace right pleural and pericardial fluid. No pathologic intrathoracic or axillary lymphadenopathy.  Calcified right upper lobe granuloma. Compressive left lower lobe atelectasis. Mild atelectasis of the lingula. No pneumothorax.  Postoperative change of the cervical spine. Mild to moderate degenerative change thoracic spine. Old healed proximal sternal body fracture.      Impression: 1. No pulmonary emboli identified. 2. Small to moderate left pleural effusion resulting in compressive atelectasis of the left lower lobe and to lesser extent lingula. Regions of trapped collapsed lower lobe parenchyma seen in association with the pleural fluid. Trace right pleural and pericardial fluid. 3. Cardiomegaly. Left subclavian cardiac pacer device. This report was finalized on 04/22/2022 12:51 by Dr. Trinity Gómez MD.    XR Chest PA & Lateral    Result Date: 4/25/2022  Narrative: EXAMINATION:  XR CHEST PA AND LATERAL-  4/25/2022 2:08 PM CDT  HISTORY: V42-Dyrgsih effusion, not elsewhere classified; R07.9-Chest pain, unspecified.  COMPARISON: 4/23/2022.  TECHNIQUE: 2 views were obtained.  FINDINGS:  There is left lung base opacification. The left hemidiaphragm is obscured. Heart size is  borderline. Pacemaker on the left. Prior cervical fusion. Degenerative changes of the spine with prior cervical fusion.      Impression: 1. Opacity at the left lung base may be due to pleural effusion, infiltrate or combination there of. The inspiration is not very deep. 2. Heart size is borderline.   This report was finalized on 04/25/2022 14:26 by Dr. Zurdo Vazquez MD.      LABORATORY/CULTURE RESULTS:      PATHOLOGY RESULTS:       ASSESSMENT/PLAN     Diagnoses and all orders for this visit:    1. Thickened nail (Primary)    2. Anticoagulant long-term use    3. Wheelchair dependent    4. Pressure ulcer of right ankle, stage 3 (HCC)    5. Hallux valgus, right      Comprehensive lower extremity examination and evaluation was performed.  Discussed findings and treatment plan including risks, benefits, and treatment options with patient in detail. Patient agreed with treatment plan.  After verbal consent obtained, nail(s) x10 debrided of length and thickness with nail nipper without incidence  Patient may maintain nails and calluses at home utilizing emery board or pumice stone between visits as needed   Remain conservative with foot deformities.   Continue to follow-up in outpatient wound care center as directed.  Right 1st and 2nd toe buddy taped  An After Visit Summary was printed and given to the patient at discharge, including (if requested) any available informative/educational handouts regarding diagnosis, treatment, or medications. All questions were answered to patient/family satisfaction. Should symptoms fail to improve or worsen they agree to call or return to clinic or to go to the Emergency Department. Discussed the importance of following up with any needed screening tests/labs/specialist appointments and any requested follow-up recommended by me today. Importance of maintaining follow-up discussed and patient accepts that missed appointments can delay diagnosis and potentially lead to worsening of  conditions.  Return in about 3 months (around 8/12/2022) for Follow-up with Podiatry Provider., or sooner if acute issues arise.    Lab Frequency Next Occurrence   Follow Anesthesia Guidelines / Standing Orders Once 03/02/2021   Provide NPO Instructions to Patient Once 03/07/2021   Chlorhexidine Skin Prep Once 03/07/2021   Obtain Informed Consent Once 03/03/2021       This document has been electronically signed by Robert Dickerson DPM on May 12, 2022 14:24 CDT

## 2022-05-13 ENCOUNTER — OFFICE VISIT (OUTPATIENT)
Dept: WOUND CARE | Facility: HOSPITAL | Age: 67
End: 2022-05-13

## 2022-05-13 ENCOUNTER — PATIENT ROUNDING (BHMG ONLY) (OUTPATIENT)
Dept: PODIATRY | Facility: CLINIC | Age: 67
End: 2022-05-13

## 2022-05-13 DIAGNOSIS — S22.080S WEDGE COMPRESSION FRACTURE OF T11-T12 VERTEBRA, SEQUELA: ICD-10-CM

## 2022-05-13 DIAGNOSIS — Z79.01 LONG TERM (CURRENT) USE OF ANTICOAGULANTS: ICD-10-CM

## 2022-05-13 DIAGNOSIS — R26.89 OTHER ABNORMALITIES OF GAIT AND MOBILITY: ICD-10-CM

## 2022-05-13 DIAGNOSIS — L89.513 PRESSURE ULCER OF RIGHT ANKLE, STAGE 3: ICD-10-CM

## 2022-05-13 PROBLEM — I10 ESSENTIAL HYPERTENSION: Status: ACTIVE | Noted: 2022-05-13

## 2022-05-13 PROBLEM — I25.10 CORONARY ARTERY DISEASE INVOLVING NATIVE CORONARY ARTERY OF NATIVE HEART WITHOUT ANGINA PECTORIS: Status: ACTIVE | Noted: 2022-05-13

## 2022-05-13 PROBLEM — I49.5 SICK SINUS SYNDROME (HCC): Status: ACTIVE | Noted: 2022-05-13

## 2022-05-13 PROCEDURE — 97597 DBRDMT OPN WND 1ST 20 CM/<: CPT | Performed by: PODIATRIST

## 2022-05-13 PROCEDURE — 11042 DBRDMT SUBQ TIS 1ST 20SQCM/<: CPT | Performed by: PODIATRIST

## 2022-05-13 PROCEDURE — 93000 ELECTROCARDIOGRAM COMPLETE: CPT | Performed by: NURSE PRACTITIONER

## 2022-05-13 RX ORDER — ISOSORBIDE MONONITRATE 60 MG/1
60 TABLET, EXTENDED RELEASE ORAL DAILY
Status: ON HOLD
Start: 2022-05-13 | End: 2022-06-04

## 2022-05-13 NOTE — PROGRESS NOTES
Subjective:     Encounter Date: 5/9/2022      Patient ID: Tawny Shin is a 66 y.o. female.    Chief Complaint: Routine follow-up CAD, SSS with pacemaker in place     History of Present Illness     The patient presents to follow-up regarding her coronary artery disease (RCA PCI in 2010, followed by non-STEMI requiring LAD PCI in 2013).  Most recent cardiac cath was done in September 2019 by Dr. Melchor, reporting patent stents and no other obstructive CAD.  She also has a history of hyperlipidemia, RA, hypertension.  She transferred her care from [facility] to Dr. Hernandez in December 2019.  At that point, she was no longer having chest pain after Imdur had been increased to 60 mg daily at the prior visit.  She was taking aspirin and Brilinta 90 mg twice daily.  Due to statin intolerance, Repatha was prescribed and she was instructed to stop taking Zetia.  She was transitioned from Brilinta to Plavix.    She came back to see Dr. Hernandez in August 2020 and she was having some short-lived substernal chest pain with no aggravating or alleviating factors.  She reported ENT thought that might be reflux.  This was noted to be different compared to her angina.  She was not requiring chest pain.  She did report an episode of vasovagal syncope when getting injections at Dr. Chaidez office. pacemaker interrogation following showed no arrhythmias at the time of her event.  No changes were made at that visit.    Dr. Hernandez and I later saw the patient in consultation when she was hospitalized in September 2021 following surgery for a chronic right hip wound.  It was noted she had had a complicated previous several months including ruptured gluteus tendon and right hip pain, SHERRI a bacteremia, lumbar surgery for osteomyelitis, and had undergone cervical fusion procedure.  She had also been diagnosed with a left lower extremity DVT in June 2021, prompting her to be placed on Eliquis.  Echo had been done in February 2021 which  showed a normal LVEF, mild MR, no evidence of vegetation on heart valves or pacemaker leads.  She was stable from a cardiac standpoint at that time.  Recommendations were given to continue aspirin 81 mg daily without interruption given prior drug-eluting stent placement in 2010 and 2013.  Beta-blocker and long-acting nitrate were continued.  She was off of Repatha due to insurance reasons, and it was noted that we would get this resumed as an outpatient or consider an alternative PCSK9 inhibitor.  Continuation of Eliquis was deferred to the prescribing physician, she was taking this for noncardiac reasons (DVT).    By way of review, she has had 3 more hospitalizations following that September 2021 hospitalization:  -Discharged 10/28/2021 following a stay for bacteremia  -Discharged 11/12/2022 following a stay for epidural hematoma  -Discharged 4/26/2022 after having presented with left-sided pleuritic chest pain and shortness of breath.  She was found to have a moderate to large left pleural effusion requiring thoracentesis and diuresis.  Cytology was negative for malignancy.  Of note, her BNP was normal.  See most recent echocardiogram done around that time below.  She did have mildly elevated flat trending troponins at 0.046 and 0.036, without any chest discomfort concerning for angina or ACS.    Today the patient states she has been doing fair overall.  She presents today in a motorized wheelchair.  She states she has very limited ability to move her right lower extremity following her surgeries.  She reports chronic lower extremity edema in recent weeks to months.  She denies any chest pain, shortness of breath, orthopnea, PND, palpitations, syncope or presyncope.  When questioned about the chest pain she had in April at the time of her pleural effusion she states this was a dull constant pain on the left side that was worse with deep breathing.  She states this resolved following the thoracentesis.  She denies  any other chest pain since that time.  It appears she does now take daily Lasix for her swelling.      The following portions of the patient's history were reviewed and updated as appropriate: allergies, current medications, past family history, past medical history, past social history, past surgical history and problem list.    Review of Systems   Constitutional: Positive for malaise/fatigue.   Cardiovascular: Positive for leg swelling. Negative for chest pain, claudication, dyspnea on exertion, near-syncope, orthopnea, palpitations, paroxysmal nocturnal dyspnea and syncope.   Respiratory: Negative for cough and shortness of breath.    Hematologic/Lymphatic: Does not bruise/bleed easily.   Musculoskeletal: Positive for muscle weakness.        Pt reports limited movement of her RLE    Gastrointestinal: Negative for bloating.   Neurological: Positive for weakness. Negative for dizziness and light-headedness.         Current Outpatient Medications:   •  apixaban (ELIQUIS) 5 MG tablet tablet, Take 1 tablet by mouth 2 (Two) Times a Day., Disp: 60 tablet, Rfl:   •  ascorbic acid (VITAMIN C) 500 MG tablet, Take 500 mg by mouth Daily., Disp: , Rfl:   •  aspirin 81 MG EC tablet, Take 81 mg by mouth Daily., Disp: , Rfl:   •  carvedilol (COREG) 25 MG tablet, Take 1 tablet by mouth 2 (Two) Times a Day With Meals., Disp: 180 tablet, Rfl: 3  •  collagenase 250 UNIT/GM ointment, Apply 1 application topically to the appropriate area as directed Daily., Disp: , Rfl:   •  Diclofenac Sodium (VOLTAREN) 1 % gel gel, Apply 4 g topically to the appropriate area as directed 4 (Four) Times a Day As Needed., Disp: , Rfl:   •  docusate sodium 100 MG capsule, Take 1 capsule by mouth 2 (Two) Times a Day., Disp: , Rfl:   •  DULoxetine (CYMBALTA) 60 MG capsule, Take 1 capsule by mouth Daily., Disp: , Rfl:   •  fluticasone (FLONASE) 50 MCG/ACT nasal spray, 1 spray into the nostril(s) as directed by provider Daily., Disp: , Rfl:   •  furosemide  (LASIX) 40 MG tablet, Take 40 mg by mouth Daily., Disp: , Rfl:   •  HYDROcodone-acetaminophen (NORCO) 7.5-325 MG per tablet, Take 1 tablet by mouth Every 8 (Eight) Hours As Needed for Moderate Pain ., Disp: 9 tablet, Rfl: 0  •  ipratropium-albuterol (DUO-NEB) 0.5-2.5 mg/3 ml nebulizer, Take 3 mL by nebulization 4 (Four) Times a Day As Needed for Wheezing., Disp: 360 mL, Rfl:   •  isosorbide mononitrate (IMDUR) 60 MG 24 hr tablet, Take 1 tablet by mouth Daily., Disp: , Rfl:   •  levothyroxine (SYNTHROID, LEVOTHROID) 75 MCG tablet, Take 1 tablet by mouth Daily., Disp: 90 tablet, Rfl: 3  •  magnesium oxide (MAG-OX) 400 MG tablet, Take 400 mg by mouth Daily., Disp: , Rfl:   •  multivitamin with minerals tablet tablet, Take 1 tablet by mouth Daily., Disp:  , Rfl:   •  nitroglycerin (Nitrostat) 0.4 MG SL tablet, Place 1 tablet under the tongue Every 5 (Five) Minutes As Needed for Chest Pain. Take no more than 3 doses in 15 minutes., Disp: , Rfl: 12  •  saccharomyces boulardii (Florastor) 250 MG capsule, Take 1 capsule by mouth Daily., Disp: 90 capsule, Rfl: 3  •  sennosides-docusate (PERICOLACE) 8.6-50 MG per tablet, Take 2 tablets by mouth 2 (Two) Times a Day., Disp: , Rfl:   •  sucralfate (CARAFATE) 1 g tablet, Take 1 g by mouth 4 (Four) Times a Day Before Meals & at Bedtime., Disp: , Rfl:   •  Thiamine HCl (VITAMIN B-1 PO), Take 100 mg by mouth Daily., Disp: , Rfl:   •  traMADol (ULTRAM) 50 MG tablet, Take 100 mg by mouth 3 (Three) Times a Day As Needed for Moderate Pain ., Disp: , Rfl:   •  Alirocumab 75 MG/ML solution auto-injector, Inject 1 mL under the skin into the appropriate area as directed Every 14 (Fourteen) Days., Disp: 2 pen, Rfl: 11       Objective:      Vitals:    05/09/22 1435   BP: 112/58   Pulse: 65   SpO2: 96%   weight - 174 lbs    Physical Exam  Constitutional:       Appearance: She is well-developed.   Neck:      Vascular: No JVD.   Cardiovascular:      Rate and Rhythm: Normal rate and regular  rhythm.      Heart sounds: Normal heart sounds. No murmur heard.  Pulmonary:      Effort: Pulmonary effort is normal.      Breath sounds: Normal breath sounds.   Skin:     General: Skin is warm and dry.   Neurological:      Mental Status: She is alert and oriented to person, place, and time.     2+ BLE edema    Lab Review:   Lab Results   Component Value Date    GLUCOSE 92 04/25/2022    BUN 21 04/25/2022    CREATININE 0.86 04/25/2022    EGFRIFNONA 99 11/11/2021    EGFRIFAFRI 110 10/12/2021    BCR 24.4 04/25/2022    K 4.0 04/25/2022    CO2 29.0 04/25/2022    CALCIUM 8.9 04/25/2022    PROTENTOTREF 6.0 06/16/2021    ALBUMIN 2.60 (L) 04/25/2022    LABIL2 1.0 06/16/2021    AST 15 04/25/2022    ALT 9 04/25/2022     Lab Results   Component Value Date    CHOL 129 02/09/2021    CHLPL 111 06/25/2020    TRIG 108 02/09/2021    HDL 36 (L) 02/09/2021    LDL 73 02/09/2021     Lab Results   Component Value Date    WBC 4.45 04/25/2022    HGB 8.2 (L) 04/25/2022    HCT 26.7 (L) 04/25/2022    MCV 89.0 04/25/2022     04/25/2022     Lab Results   Component Value Date    HGBA1C 5.20 04/23/2022           ECG 12 Lead    Date/Time: 5/13/2022 1:58 PM  Performed by: Ximena Madison APRN  Authorized by: Ximena Madison APRN   Comparison: compared with previous ECG from 4/22/2022  Similar to previous ECG  Rhythm: sinus rhythm  BPM: 61  Conduction: left anterior fascicular block          4/2022 echo:    · Left ventricular ejection fraction appears to be 66 - 70%.  · Left ventricular diastolic function was normal.  · Abnormal global longitudinal LV strain (GLS) = -10.0%  · Left atrial volume is mildly increased.  · Estimated right ventricular systolic pressure from tricuspid regurgitation is normal (<35 mmHg).             Assessment/Plan:     Problem List Items Addressed This Visit        Cardiovascular and Mediastinum    Coronary artery disease - Primary    Overview     PCI RCA '10, NSTEMI requiring LAD PCI in '13    [facility] 9/3/19 at [place]: no  significant dz; stents patent         Hyperlipidemia: remarkable improvement on repatha    Hypertension: well controlled    Pacemaker: functioning appropriately    Sick sinus syndrome (CMS/HCC): stable        Vasovagal syncope     Recommendations/plans:    1.  CAD: Established problem, stable.  No angina.  -Continue aspirin 81 mg daily indefinitely without interruption.  -Continue current doses of carvedilol and Imdur  -Has not been requiring as needed sublingual nitroglycerin    2.  Sick sinus syndrome with dual-chamber pacemaker in place: Device interrogated today: Satisfactory battery life, no arrhythmia episodes, normal device function, 8.9% atrial pacing, less than 0.1% ventricular pacing.  -Continue with routine device interrogations    3.  Essential hypertension: Well-controlled on Coreg and nitrate.    4.  Hyperlipidemia: Intolerant to statins.  Has been off of Repatha due to insurance coverage reasons, therefore, we will try prescribing Praluent 75 mg every 14 days and see what we can do to obtain PCSK9 inhibitor approval given her coronary disease.  We discussed the goal LDL of less than 70.  Recent lipid panel not currently available to me.  Will place order if recent lipid panel not available from PCP office.    5.  Lower extremity DVT June 2021: Anticoagulated with Eliquis per PCP.  As previously noted by Dr. Hernandez, the decision regarding how long to continue this will be up to the prescribing provider, as she does not take this for cardiac reason.    F/u 6 months with Dr. Hernandez, sooner with symptoms or concerns    Ximena Madison, APRN

## 2022-05-13 NOTE — PROGRESS NOTES
Chief Complaint  Hospital Follow Up Visit and Pleural Effusion (Doing well per patient )    Subjective    History of Present Illness {CC  Problem List  Visit Diagnosis   Encounters  Notes  Medications  Labs  Result Review Imaging  Media     Tawny Shin presents to [facility] for:    Ms. Veronica is here for hospital follow-up.  He was experiencing pain with deep breathing.  She was found to have a left-sided pleural effusion and underwent thoracentesis in April of this year.  Pleural fluid studies did not suggest this was related to rheumatoid arthritis.  There was some exudative components on pleural studies.  She reports pain with breathing has since resolved.  She denies cough.  She has seen Dr. Chaidez and has been restarted on Cimzia and prednisone 2 mg daily.  Afebrile.  She is up-to-date on vaccinations.  He denies any previous lung history.  She was an  for a magazine.  She has not been on any inhalers or oxygen in the past.  No lung history in her family.       Prior to Admission medications    Medication Sig Start Date End Date Taking? Authorizing Provider   apixaban (ELIQUIS) 5 MG tablet tablet Take 1 tablet by mouth 2 (Two) Times a Day. 11/12/21   Taiwo Prado APRN   ascorbic acid (VITAMIN C) 500 MG tablet Take 500 mg by mouth Daily.    ProviderLeila MD   aspirin 81 MG EC tablet Take 81 mg by mouth Daily.    ProviderLeila MD   carvedilol (COREG) 25 MG tablet Take 1 tablet by mouth 2 (Two) Times a Day With Meals. 7/1/21   Davon Urrutia MD   collagenase 250 UNIT/GM ointment Apply 1 application topically to the appropriate area as directed Daily. 4/26/22   Butch Chong MD   Diclofenac Sodium (VOLTAREN) 1 % gel gel Apply 4 g topically to the appropriate area as directed 4 (Four) Times a Day As Needed.    ProviderLeila MD   docusate sodium 100 MG capsule Take 1 capsule by mouth 2 (Two) Times a Day.  7/13/21   Christos Min DO   DULoxetine (CYMBALTA) 60 MG capsule Take 1 capsule by mouth Daily. 11/17/21   Taiwo Prado APRN   fluticasone (FLONASE) 50 MCG/ACT nasal spray 1 spray into the nostril(s) as directed by provider Daily.    Leila Daly MD   furosemide (LASIX) 40 MG tablet Take 40 mg by mouth Daily.    Leila Daly MD   HYDROcodone-acetaminophen (NORCO) 7.5-325 MG per tablet Take 1 tablet by mouth Every 8 (Eight) Hours As Needed for Moderate Pain . 4/26/22   Butch Chong MD   ipratropium-albuterol (DUO-NEB) 0.5-2.5 mg/3 ml nebulizer Take 3 mL by nebulization 4 (Four) Times a Day As Needed for Wheezing. 4/26/22   Butch Chong MD   isosorbide mononitrate (IMDUR) 60 MG 24 hr tablet Take 1 tablet by mouth 2 (Two) Times a Day. 3/26/21   Taiwo Prado APRN   levothyroxine (SYNTHROID, LEVOTHROID) 75 MCG tablet Take 1 tablet by mouth Daily. 6/22/21   Davon Urrutia MD   magnesium oxide (MAG-OX) 400 MG tablet Take 400 mg by mouth Daily. 4/12/21   Leila Daly MD   multivitamin with minerals tablet tablet Take 1 tablet by mouth Daily. 3/6/21   Taiwo Prado APRN   nitroglycerin (Nitrostat) 0.4 MG SL tablet Place 1 tablet under the tongue Every 5 (Five) Minutes As Needed for Chest Pain. Take no more than 3 doses in 15 minutes. 3/26/21   Taiwo Prado APRN   saccharomyces boulardii (Florastor) 250 MG capsule Take 1 capsule by mouth Daily. 7/1/21   Davon Urrutia MD   sennosides-docusate (PERICOLACE) 8.6-50 MG per tablet Take 2 tablets by mouth 2 (Two) Times a Day. 11/12/21   Taiwo Prado APRN   sucralfate (CARAFATE) 1 g tablet Take 1 g by mouth 4 (Four) Times a Day Before Meals & at Bedtime.    Provider, Historical, MD   Thiamine HCl (VITAMIN B-1 PO) Take 100 mg by mouth Daily.    Provider, Historical, MD   traMADol (ULTRAM) 50 MG tablet Take 100 mg by mouth 3 (Three) Times a Day As Needed for Moderate Pain .    Provider, Historical, MD       Social History  "    Socioeconomic History   • Marital status:    Tobacco Use   • Smoking status: Never Smoker   • Smokeless tobacco: Never Used   Vaping Use   • Vaping Use: Never used   Substance and Sexual Activity   • Alcohol use: No   • Drug use: Never   • Sexual activity: Defer       Objective   Vital Signs:   /74   Pulse 68   Ht 167.6 cm (66\")   Wt 79.8 kg (176 lb) Comment: verbalized  SpO2 97%   BMI 28.41 kg/m²     Physical Exam  Constitutional:       General: She is not in acute distress.  HENT:      Head: Normocephalic.      Nose: Nose normal.      Mouth/Throat:      Mouth: Mucous membranes are moist.   Eyes:      General: No scleral icterus.  Cardiovascular:      Rate and Rhythm: Normal rate.   Pulmonary:      Effort: No respiratory distress.   Abdominal:      General: There is no distension.   Musculoskeletal:      Right lower leg: Edema (wraps) present.      Left lower leg: Edema (wraps) present.      Comments: Power chair   Neurological:      Mental Status: She is alert and oriented to person, place, and time.   Psychiatric:         Mood and Affect: Mood normal.         Behavior: Behavior is cooperative.        Result Review :{ Labs  Result Review  Imaging  Med Tab  Media :          No results found for this or any previous visit.                   Assessment and Plan {CC Problem List  Visit Diagnosis  ROS  Review (Popup)  Health Maintenance  Quality  BestPractice  Medications  SmartSets  SnapShot Encounters  Media      Diagnoses and all orders for this visit:    1. Pleural effusion (Primary)  -     XR Chest 2 View; Future        BMI is >= 25 and < 30. (Overweight) The following options were offered after discussion: weight loss educational material (shared in after visit summary)      She is overall stable from a pulmonary standpoint.  Repeat chest x-ray in the next 1 to 2 weeks.  If recurrent effusion we discussed referral to CT surgery.  Otherwise if chest x-ray is stable at that " point we will repeat in 6 months and determine next steps pending films.  We will follow-up chest x-ray by phone and return to clinic in 6 months.  Call in interim with issues.    Leta Crawford, DANIELA  5/17/2022  16:13 CDT    Follow Up {Instructions Charge Capture  Follow-up Communications   Return in about 6 months (around 11/17/2022).    Patient was given instructions and counseling regarding her condition or for health maintenance advice. Please see specific information pulled into the AVS if appropriate.

## 2022-05-13 NOTE — PROGRESS NOTES
May 13, 2022    Hello, may I speak with Tawny Shin?    My name is ABUNDIO      I am  with [facility]  [address], [address]  [place] [address] [phone].    Before we get started may I verify your date of birth? 1955    I am calling to officially welcome you to our practice and ask about your recent visit. Is this a good time to talk? yes    Tell me about your visit with us. What things went well?  Everyone was wonderful Dr Dickerson is great.       We're always looking for ways to make our patients' experiences even better. Do you have recommendations on ways we may improve?  no    Overall were you satisfied with your first visit to our practice? yes       I appreciate you taking the time to speak with me today. Is there anything else I can do for you? no      Thank you, and have a great day.

## 2022-05-17 ENCOUNTER — OFFICE VISIT (OUTPATIENT)
Dept: PULMONOLOGY | Facility: CLINIC | Age: 67
End: 2022-05-17

## 2022-05-17 VITALS
WEIGHT: 176 LBS | SYSTOLIC BLOOD PRESSURE: 128 MMHG | DIASTOLIC BLOOD PRESSURE: 74 MMHG | HEART RATE: 68 BPM | BODY MASS INDEX: 28.28 KG/M2 | HEIGHT: 66 IN | OXYGEN SATURATION: 97 %

## 2022-05-17 DIAGNOSIS — J90 PLEURAL EFFUSION: Primary | ICD-10-CM

## 2022-05-17 PROCEDURE — 99213 OFFICE O/P EST LOW 20 MIN: CPT | Performed by: NURSE PRACTITIONER

## 2022-05-19 ENCOUNTER — HOSPITAL ENCOUNTER (OUTPATIENT)
Dept: GENERAL RADIOLOGY | Facility: HOSPITAL | Age: 67
Discharge: HOME | End: 2022-05-19
Admitting: NURSE PRACTITIONER

## 2022-05-19 DIAGNOSIS — J90 PLEURAL EFFUSION: ICD-10-CM

## 2022-05-19 PROCEDURE — 71046 X-RAY EXAM CHEST 2 VIEWS: CPT

## 2022-05-20 ENCOUNTER — OFFICE VISIT (OUTPATIENT)
Dept: WOUND CARE | Facility: HOSPITAL | Age: 67
End: 2022-05-20

## 2022-05-20 DIAGNOSIS — L89.513 PRESSURE ULCER OF RIGHT ANKLE, STAGE 3: ICD-10-CM

## 2022-05-20 DIAGNOSIS — R26.89 OTHER ABNORMALITIES OF GAIT AND MOBILITY: ICD-10-CM

## 2022-05-20 DIAGNOSIS — S22.080S WEDGE COMPRESSION FRACTURE OF T11-T12 VERTEBRA, SEQUELA: ICD-10-CM

## 2022-05-20 DIAGNOSIS — Z79.01 LONG TERM (CURRENT) USE OF ANTICOAGULANTS: ICD-10-CM

## 2022-05-20 PROCEDURE — 11042 DBRDMT SUBQ TIS 1ST 20SQCM/<: CPT | Performed by: PODIATRIST

## 2022-05-20 PROCEDURE — 97597 DBRDMT OPN WND 1ST 20 CM/<: CPT | Performed by: PODIATRIST

## 2022-05-24 ENCOUNTER — TELEPHONE (OUTPATIENT)
Dept: INTERNAL MEDICINE | Facility: CLINIC | Age: 67
End: 2022-05-24

## 2022-05-24 ENCOUNTER — OFFICE VISIT (OUTPATIENT)
Dept: INTERNAL MEDICINE | Facility: CLINIC | Age: 67
End: 2022-05-24

## 2022-05-24 VITALS
HEART RATE: 65 BPM | HEIGHT: 66 IN | TEMPERATURE: 97.1 F | BODY MASS INDEX: 28.41 KG/M2 | OXYGEN SATURATION: 98 % | DIASTOLIC BLOOD PRESSURE: 64 MMHG | SYSTOLIC BLOOD PRESSURE: 136 MMHG

## 2022-05-24 DIAGNOSIS — H65.93 FLUID LEVEL BEHIND TYMPANIC MEMBRANE OF BOTH EARS: ICD-10-CM

## 2022-05-24 DIAGNOSIS — M79.671 RIGHT FOOT PAIN: Primary | ICD-10-CM

## 2022-05-24 PROCEDURE — 99214 OFFICE O/P EST MOD 30 MIN: CPT | Performed by: NURSE PRACTITIONER

## 2022-05-24 RX ORDER — FLUTICASONE PROPIONATE 50 MCG
1 SPRAY, SUSPENSION (ML) NASAL DAILY
Qty: 18.2 ML | Refills: 5 | Status: SHIPPED | OUTPATIENT
Start: 2022-05-24

## 2022-05-24 RX ORDER — METHYLPREDNISOLONE 4 MG/1
TABLET ORAL
Qty: 21 TABLET | Refills: 0 | Status: ON HOLD | OUTPATIENT
Start: 2022-05-24 | End: 2022-06-04

## 2022-05-24 NOTE — TELEPHONE ENCOUNTER
Lucero with [facility] called to [facility] pharmacy info. [facility] T# [phone]

## 2022-05-24 NOTE — PROGRESS NOTES
"Chief Complaint  Foot Problem (Right, below little toe /Goes to wound care.)    Subjective          Tawny Shin presents to [facility]  Patient states that she has been having severe foot pain for several weeks. She is following with wound care for wounds to her right ankle. She states that she has mentioned the pain every time and nothing has been done      Objective   Vital Signs:  /64 (BP Location: Left arm, Patient Position: Sitting, Cuff Size: Adult)   Pulse 65   Temp 97.1 °F (36.2 °C)   Ht 167.6 cm (66\")   SpO2 98%   BMI 28.41 kg/m²         Physical Exam  Vitals and nursing note reviewed.   Constitutional:       Appearance: Normal appearance. She is well-developed.   HENT:      Head: Normocephalic and atraumatic.      Right Ear: Tympanic membrane, ear canal and external ear normal.      Left Ear: Tympanic membrane, ear canal and external ear normal.      Nose: Nose normal. No septal deviation, nasal tenderness or congestion.      Mouth/Throat:      Lips: Pink. No lesions.      Mouth: Mucous membranes are moist. No oral lesions.      Dentition: Normal dentition.      Pharynx: Oropharynx is clear. No pharyngeal swelling, oropharyngeal exudate or posterior oropharyngeal erythema.   Eyes:      General: Lids are normal. Vision grossly intact. No scleral icterus.        Right eye: No discharge.         Left eye: No discharge.      Extraocular Movements: Extraocular movements intact.      Conjunctiva/sclera: Conjunctivae normal.      Right eye: Right conjunctiva is not injected.      Left eye: Left conjunctiva is not injected.      Pupils: Pupils are equal, round, and reactive to light.   Neck:      Thyroid: No thyroid mass.      Trachea: Trachea normal.   Cardiovascular:      Rate and Rhythm: Normal rate and regular rhythm.      Heart sounds: Normal heart sounds. No murmur heard.    No gallop.   Pulmonary:      Effort: Pulmonary effort is normal.      Breath sounds: " Normal breath sounds and air entry. No wheezing, rhonchi or rales.   Musculoskeletal:         General: No tenderness or deformity.      Cervical back: Full passive range of motion without pain, normal range of motion and neck supple.      Thoracic back: Normal.      Right lower leg: No edema.      Left lower leg: No edema.      Right foot: Decreased range of motion. Prominent metatarsal heads present.   Feet:      Right foot:      Skin integrity: Dry skin present.      Comments: Shea boots to bilat legs  Skin:     General: Skin is warm and dry.      Coloration: Skin is not jaundiced.      Findings: No rash.   Neurological:      Mental Status: She is alert and oriented to person, place, and time.      Cranial Nerves: Cranial nerves are intact.      Sensory: Sensation is intact.      Motor: Motor function is intact.      Coordination: Coordination is intact.      Gait: Gait is intact.      Deep Tendon Reflexes: Reflexes are normal and symmetric.      Comments: Electric wheel chair   Psychiatric:         Mood and Affect: Mood and affect normal.         Behavior: Behavior normal.         Judgment: Judgment normal.        Result Review :                 Assessment and Plan    Diagnoses and all orders for this visit:    1. Right foot pain (Primary)  -     MRI Foot Right Without Contrast; Future    2. Fluid level behind tympanic membrane of both ears    Other orders  -     fluticasone (FLONASE) 50 MCG/ACT nasal spray; 1 spray into the nostril(s) as directed by provider Daily.  Dispense: 18.2 mL; Refill: 5  -     methylPREDNISolone (MEDROL) 4 MG dose pack; Take as directed on package instructions.  Dispense: 21 tablet; Refill: 0    . The pain is to the 4th and 5th metatarsal area. There is a bony abnormality noted with palpation. Patient states that she is unable to apply pressure to that foot due to the pain.   Patient also c/o her ears feeling full. She has been taking flonase, but is almost out. On exam, the TMs are  bulging but do not look infected.   Plan:  1. MRI of the foot  2. Flonase and steroid pack       Follow Up   Return if symptoms worsen or fail to improve.  Patient was given instructions and counseling regarding her condition or for health maintenance advice. Please see specific information pulled into the AVS if appropriate.

## 2022-05-27 ENCOUNTER — OFFICE VISIT (OUTPATIENT)
Dept: WOUND CARE | Facility: HOSPITAL | Age: 67
End: 2022-05-27

## 2022-05-27 DIAGNOSIS — S22.080S WEDGE COMPRESSION FRACTURE OF T11-T12 VERTEBRA, SEQUELA: ICD-10-CM

## 2022-05-27 DIAGNOSIS — L89.890 PRESSURE ULCER OF OTHER SITE, UNSTAGEABLE: ICD-10-CM

## 2022-05-27 PROCEDURE — 11042 DBRDMT SUBQ TIS 1ST 20SQCM/<: CPT | Performed by: NURSE PRACTITIONER

## 2022-06-01 ENCOUNTER — NURSE TRIAGE (OUTPATIENT)
Dept: CARDIOLOGY | Facility: CLINIC | Age: 67
End: 2022-06-01

## 2022-06-01 NOTE — TELEPHONE ENCOUNTER
----- Message from DANIELA Hussein sent at 5/13/2022  2:28 PM CDT -----  The patient previously took Repatha, but quit taking this due to lack of insurance coverage.  We had her on this because of her CAD and intolerance to statins.  Today, I prescribed Praluent 75 mg every 14 days instead to see if we can get that approved.  Also ordered a lipid panel, because I cannot find a recent 1.  Please obtain most recent lipid panel from PCP, or if there is not one have her get the 1 drawn that I ordered.  I believe she may be residing in a nursing home so this may need to be faxed wherever she is residing.  If Praluent is not covered, let me know and I can try prescribing Repatha again.  Please inform the patient of the plan.  Thanks!

## 2022-06-01 NOTE — TELEPHONE ENCOUNTER
The patient was contacted and made aware of this plan.  Labs are faxed and received back.  The script was faxed to [facility] to fill on 5/26/22 with confirmation of receipt.

## 2022-06-03 ENCOUNTER — HOSPITAL ENCOUNTER (INPATIENT)
Facility: HOSPITAL | Age: 67
LOS: 10 days | Discharge: NURSING, REHAB OR LONG-TERM CARE | End: 2022-06-14
Attending: EMERGENCY MEDICINE | Admitting: INTERNAL MEDICINE

## 2022-06-03 ENCOUNTER — APPOINTMENT (OUTPATIENT)
Dept: CT IMAGING | Facility: HOSPITAL | Age: 67
End: 2022-06-03

## 2022-06-03 ENCOUNTER — OFFICE VISIT (OUTPATIENT)
Dept: WOUND CARE | Facility: HOSPITAL | Age: 67
End: 2022-06-03

## 2022-06-03 ENCOUNTER — APPOINTMENT (OUTPATIENT)
Dept: WOUND CARE | Facility: HOSPITAL | Age: 67
End: 2022-06-03

## 2022-06-03 DIAGNOSIS — Z79.01 LONG TERM (CURRENT) USE OF ANTICOAGULANTS: ICD-10-CM

## 2022-06-03 DIAGNOSIS — R13.19 OTHER DYSPHAGIA: ICD-10-CM

## 2022-06-03 DIAGNOSIS — R26.89 OTHER ABNORMALITIES OF GAIT AND MOBILITY: ICD-10-CM

## 2022-06-03 DIAGNOSIS — L89.890 PRESSURE ULCER OF OTHER SITE, UNSTAGEABLE: ICD-10-CM

## 2022-06-03 DIAGNOSIS — R41.3 SHORT-TERM MEMORY LOSS: ICD-10-CM

## 2022-06-03 DIAGNOSIS — L02.415 ABSCESS OF HIP, RIGHT: ICD-10-CM

## 2022-06-03 DIAGNOSIS — L02.415 ABSCESS OF RIGHT HIP: Primary | ICD-10-CM

## 2022-06-03 DIAGNOSIS — S22.080S WEDGE COMPRESSION FRACTURE OF T11-T12 VERTEBRA, SEQUELA: ICD-10-CM

## 2022-06-03 DIAGNOSIS — Z74.09 IMPAIRED MOBILITY: ICD-10-CM

## 2022-06-03 LAB
ALBUMIN SERPL-MCNC: 2.7 G/DL (ref 3.5–5.2)
ALBUMIN/GLOB SERPL: 0.5 G/DL
ALP SERPL-CCNC: 129 U/L (ref 39–117)
ALT SERPL W P-5'-P-CCNC: 13 U/L (ref 1–33)
ANION GAP SERPL CALCULATED.3IONS-SCNC: 9 MMOL/L (ref 5–15)
AST SERPL-CCNC: 18 U/L (ref 1–32)
BASOPHILS # BLD AUTO: 0.03 10*3/MM3 (ref 0–0.2)
BASOPHILS NFR BLD AUTO: 0.4 % (ref 0–1.5)
BILIRUB SERPL-MCNC: 0.2 MG/DL (ref 0–1.2)
BUN SERPL-MCNC: 18 MG/DL (ref 8–23)
BUN/CREAT SERPL: 21.4 (ref 7–25)
CALCIUM SPEC-SCNC: 8.4 MG/DL (ref 8.6–10.5)
CHLORIDE SERPL-SCNC: 101 MMOL/L (ref 98–107)
CO2 SERPL-SCNC: 28 MMOL/L (ref 22–29)
CREAT SERPL-MCNC: 0.84 MG/DL (ref 0.57–1)
D-LACTATE SERPL-SCNC: 0.7 MMOL/L (ref 0.5–2)
DEPRECATED RDW RBC AUTO: 50.7 FL (ref 37–54)
EGFRCR SERPLBLD CKD-EPI 2021: 75.8 ML/MIN/1.73
EOSINOPHIL # BLD AUTO: 0.18 10*3/MM3 (ref 0–0.4)
EOSINOPHIL NFR BLD AUTO: 2.3 % (ref 0.3–6.2)
ERYTHROCYTE [DISTWIDTH] IN BLOOD BY AUTOMATED COUNT: 15.9 % (ref 12.3–15.4)
GLOBULIN UR ELPH-MCNC: 5 GM/DL
GLUCOSE SERPL-MCNC: 113 MG/DL (ref 65–99)
HCT VFR BLD AUTO: 27.1 % (ref 34–46.6)
HGB BLD-MCNC: 8.4 G/DL (ref 12–15.9)
IMM GRANULOCYTES # BLD AUTO: 0.04 10*3/MM3 (ref 0–0.05)
IMM GRANULOCYTES NFR BLD AUTO: 0.5 % (ref 0–0.5)
LYMPHOCYTES # BLD AUTO: 1.43 10*3/MM3 (ref 0.7–3.1)
LYMPHOCYTES NFR BLD AUTO: 18.4 % (ref 19.6–45.3)
MCH RBC QN AUTO: 27 PG (ref 26.6–33)
MCHC RBC AUTO-ENTMCNC: 31 G/DL (ref 31.5–35.7)
MCV RBC AUTO: 87.1 FL (ref 79–97)
MONOCYTES # BLD AUTO: 0.65 10*3/MM3 (ref 0.1–0.9)
MONOCYTES NFR BLD AUTO: 8.4 % (ref 5–12)
NEUTROPHILS NFR BLD AUTO: 5.43 10*3/MM3 (ref 1.7–7)
NEUTROPHILS NFR BLD AUTO: 70 % (ref 42.7–76)
NRBC BLD AUTO-RTO: 0 /100 WBC (ref 0–0.2)
PLATELET # BLD AUTO: 241 10*3/MM3 (ref 140–450)
PMV BLD AUTO: 9 FL (ref 6–12)
POTASSIUM SERPL-SCNC: 3.7 MMOL/L (ref 3.5–5.2)
PROT SERPL-MCNC: 7.7 G/DL (ref 6–8.5)
RBC # BLD AUTO: 3.11 10*6/MM3 (ref 3.77–5.28)
SODIUM SERPL-SCNC: 138 MMOL/L (ref 136–145)
WBC NRBC COR # BLD: 7.76 10*3/MM3 (ref 3.4–10.8)

## 2022-06-03 PROCEDURE — 83735 ASSAY OF MAGNESIUM: CPT | Performed by: INTERNAL MEDICINE

## 2022-06-03 PROCEDURE — 80053 COMPREHEN METABOLIC PANEL: CPT | Performed by: EMERGENCY MEDICINE

## 2022-06-03 PROCEDURE — 87040 BLOOD CULTURE FOR BACTERIA: CPT | Performed by: EMERGENCY MEDICINE

## 2022-06-03 PROCEDURE — 83605 ASSAY OF LACTIC ACID: CPT | Performed by: EMERGENCY MEDICINE

## 2022-06-03 PROCEDURE — 36415 COLL VENOUS BLD VENIPUNCTURE: CPT

## 2022-06-03 PROCEDURE — 99285 EMERGENCY DEPT VISIT HI MDM: CPT

## 2022-06-03 PROCEDURE — 11042 DBRDMT SUBQ TIS 1ST 20SQCM/<: CPT | Performed by: PODIATRIST

## 2022-06-03 PROCEDURE — 86140 C-REACTIVE PROTEIN: CPT | Performed by: INTERNAL MEDICINE

## 2022-06-03 PROCEDURE — 73700 CT LOWER EXTREMITY W/O DYE: CPT

## 2022-06-03 PROCEDURE — 84439 ASSAY OF FREE THYROXINE: CPT | Performed by: INTERNAL MEDICINE

## 2022-06-03 PROCEDURE — 85025 COMPLETE CBC W/AUTO DIFF WBC: CPT | Performed by: EMERGENCY MEDICINE

## 2022-06-03 PROCEDURE — 85652 RBC SED RATE AUTOMATED: CPT | Performed by: INTERNAL MEDICINE

## 2022-06-03 PROCEDURE — 84443 ASSAY THYROID STIM HORMONE: CPT | Performed by: INTERNAL MEDICINE

## 2022-06-03 RX ORDER — SODIUM CHLORIDE 0.9 % (FLUSH) 0.9 %
10 SYRINGE (ML) INJECTION AS NEEDED
Status: DISCONTINUED | OUTPATIENT
Start: 2022-06-03 | End: 2022-06-14 | Stop reason: HOSPADM

## 2022-06-04 ENCOUNTER — ANESTHESIA (OUTPATIENT)
Dept: PERIOP | Facility: HOSPITAL | Age: 67
End: 2022-06-04

## 2022-06-04 ENCOUNTER — ANESTHESIA EVENT (OUTPATIENT)
Dept: PERIOP | Facility: HOSPITAL | Age: 67
End: 2022-06-04

## 2022-06-04 ENCOUNTER — APPOINTMENT (OUTPATIENT)
Dept: GENERAL RADIOLOGY | Facility: HOSPITAL | Age: 67
End: 2022-06-04

## 2022-06-04 PROBLEM — G89.29 CHRONIC PAIN: Status: ACTIVE | Noted: 2022-06-04

## 2022-06-04 PROBLEM — L02.91 ABSCESS: Status: ACTIVE | Noted: 2022-06-04

## 2022-06-04 PROBLEM — Z97.8 CHRONIC INDWELLING FOLEY CATHETER: Status: ACTIVE | Noted: 2022-06-04

## 2022-06-04 PROBLEM — L02.415 ABSCESS OF RIGHT HIP: Status: ACTIVE | Noted: 2022-06-03

## 2022-06-04 PROBLEM — L89.609 PRESSURE INJURY OF SKIN OF HEEL: Status: ACTIVE | Noted: 2022-06-04

## 2022-06-04 LAB
ABO GROUP BLD: NORMAL
ALBUMIN SERPL-MCNC: 2.5 G/DL (ref 3.5–5.2)
ALBUMIN/GLOB SERPL: 0.5 G/DL
ALP SERPL-CCNC: 114 U/L (ref 39–117)
ALT SERPL W P-5'-P-CCNC: 11 U/L (ref 1–33)
ANION GAP SERPL CALCULATED.3IONS-SCNC: 9 MMOL/L (ref 5–15)
AST SERPL-CCNC: 16 U/L (ref 1–32)
BACTERIA UR QL AUTO: ABNORMAL /HPF
BASOPHILS # BLD AUTO: 0.02 10*3/MM3 (ref 0–0.2)
BASOPHILS NFR BLD AUTO: 0.4 % (ref 0–1.5)
BILIRUB SERPL-MCNC: 0.2 MG/DL (ref 0–1.2)
BILIRUB UR QL STRIP: NEGATIVE
BLD GP AB SCN SERPL QL: NEGATIVE
BUN SERPL-MCNC: 15 MG/DL (ref 8–23)
BUN/CREAT SERPL: 18.8 (ref 7–25)
CALCIUM SPEC-SCNC: 8.5 MG/DL (ref 8.6–10.5)
CHLORIDE SERPL-SCNC: 102 MMOL/L (ref 98–107)
CLARITY UR: ABNORMAL
CO2 SERPL-SCNC: 27 MMOL/L (ref 22–29)
COLOR UR: YELLOW
CREAT SERPL-MCNC: 0.8 MG/DL (ref 0.57–1)
CRP SERPL-MCNC: 15.01 MG/DL (ref 0–0.5)
DEPRECATED RDW RBC AUTO: 51.8 FL (ref 37–54)
EGFRCR SERPLBLD CKD-EPI 2021: 80.4 ML/MIN/1.73
EOSINOPHIL # BLD AUTO: 0.19 10*3/MM3 (ref 0–0.4)
EOSINOPHIL NFR BLD AUTO: 3.6 % (ref 0.3–6.2)
ERYTHROCYTE [DISTWIDTH] IN BLOOD BY AUTOMATED COUNT: 16 % (ref 12.3–15.4)
ERYTHROCYTE [SEDIMENTATION RATE] IN BLOOD: 60 MM/HR (ref 0–30)
FUNGUS WND CULT: NORMAL
GLOBULIN UR ELPH-MCNC: 4.8 GM/DL
GLUCOSE SERPL-MCNC: 139 MG/DL (ref 65–99)
GLUCOSE UR STRIP-MCNC: NEGATIVE MG/DL
HCT VFR BLD AUTO: 26.3 % (ref 34–46.6)
HGB BLD-MCNC: 7.9 G/DL (ref 12–15.9)
HGB UR QL STRIP.AUTO: ABNORMAL
HYALINE CASTS UR QL AUTO: ABNORMAL /LPF
IMM GRANULOCYTES # BLD AUTO: 0.03 10*3/MM3 (ref 0–0.05)
IMM GRANULOCYTES NFR BLD AUTO: 0.6 % (ref 0–0.5)
KETONES UR QL STRIP: NEGATIVE
LEUKOCYTE ESTERASE UR QL STRIP.AUTO: ABNORMAL
LYMPHOCYTES # BLD AUTO: 1.01 10*3/MM3 (ref 0.7–3.1)
LYMPHOCYTES NFR BLD AUTO: 19.4 % (ref 19.6–45.3)
MAGNESIUM SERPL-MCNC: 1.7 MG/DL (ref 1.6–2.4)
MCH RBC QN AUTO: 26.3 PG (ref 26.6–33)
MCHC RBC AUTO-ENTMCNC: 30 G/DL (ref 31.5–35.7)
MCV RBC AUTO: 87.7 FL (ref 79–97)
MONOCYTES # BLD AUTO: 0.31 10*3/MM3 (ref 0.1–0.9)
MONOCYTES NFR BLD AUTO: 6 % (ref 5–12)
MYCOBACTERIUM SPEC CULT: NORMAL
NEUTROPHILS NFR BLD AUTO: 3.65 10*3/MM3 (ref 1.7–7)
NEUTROPHILS NFR BLD AUTO: 70 % (ref 42.7–76)
NIGHT BLUE STAIN TISS: NORMAL
NIGHT BLUE STAIN TISS: NORMAL
NITRITE UR QL STRIP: NEGATIVE
NRBC BLD AUTO-RTO: 0 /100 WBC (ref 0–0.2)
PH UR STRIP.AUTO: 8 [PH] (ref 5–8)
PLATELET # BLD AUTO: 222 10*3/MM3 (ref 140–450)
PMV BLD AUTO: 9.4 FL (ref 6–12)
POTASSIUM SERPL-SCNC: 3.7 MMOL/L (ref 3.5–5.2)
PROT SERPL-MCNC: 7.3 G/DL (ref 6–8.5)
PROT UR QL STRIP: ABNORMAL
QT INTERVAL: 404 MS
QTC INTERVAL: 426 MS
RBC # BLD AUTO: 3 10*6/MM3 (ref 3.77–5.28)
RBC # UR STRIP: ABNORMAL /HPF
REF LAB TEST METHOD: ABNORMAL
RH BLD: POSITIVE
SARS-COV-2 RNA PNL SPEC NAA+PROBE: NOT DETECTED
SODIUM SERPL-SCNC: 138 MMOL/L (ref 136–145)
SP GR UR STRIP: 1.02 (ref 1–1.03)
SQUAMOUS #/AREA URNS HPF: ABNORMAL /HPF
T&S EXPIRATION DATE: NORMAL
T4 FREE SERPL-MCNC: 0.58 NG/DL (ref 0.93–1.7)
TSH SERPL DL<=0.05 MIU/L-ACNC: 3.3 UIU/ML (ref 0.27–4.2)
UROBILINOGEN UR QL STRIP: ABNORMAL
WBC # UR STRIP: ABNORMAL /HPF
WBC NRBC COR # BLD: 5.21 10*3/MM3 (ref 3.4–10.8)

## 2022-06-04 PROCEDURE — 25010000002 CEFEPIME PER 500 MG: Performed by: SPECIALIST

## 2022-06-04 PROCEDURE — 87186 SC STD MICRODIL/AGAR DIL: CPT | Performed by: INTERNAL MEDICINE

## 2022-06-04 PROCEDURE — 87077 CULTURE AEROBIC IDENTIFY: CPT | Performed by: INTERNAL MEDICINE

## 2022-06-04 PROCEDURE — 25010000002 FENTANYL CITRATE (PF) 100 MCG/2ML SOLUTION: Performed by: NURSE ANESTHETIST, CERTIFIED REGISTERED

## 2022-06-04 PROCEDURE — 93010 ELECTROCARDIOGRAM REPORT: CPT | Performed by: INTERNAL MEDICINE

## 2022-06-04 PROCEDURE — 0Y9C0ZX DRAINAGE OF RIGHT UPPER LEG, OPEN APPROACH, DIAGNOSTIC: ICD-10-PCS | Performed by: SPECIALIST

## 2022-06-04 PROCEDURE — 87147 CULTURE TYPE IMMUNOLOGIC: CPT | Performed by: INTERNAL MEDICINE

## 2022-06-04 PROCEDURE — 99222 1ST HOSP IP/OBS MODERATE 55: CPT | Performed by: SPECIALIST

## 2022-06-04 PROCEDURE — 86901 BLOOD TYPING SEROLOGIC RH(D): CPT | Performed by: SPECIALIST

## 2022-06-04 PROCEDURE — 71045 X-RAY EXAM CHEST 1 VIEW: CPT

## 2022-06-04 PROCEDURE — 93005 ELECTROCARDIOGRAM TRACING: CPT | Performed by: INTERNAL MEDICINE

## 2022-06-04 PROCEDURE — 86850 RBC ANTIBODY SCREEN: CPT | Performed by: SPECIALIST

## 2022-06-04 PROCEDURE — 25010000002 VANCOMYCIN 1 G RECONSTITUTED SOLUTION 1 EACH VIAL: Performed by: SPECIALIST

## 2022-06-04 PROCEDURE — 81001 URINALYSIS AUTO W/SCOPE: CPT | Performed by: INTERNAL MEDICINE

## 2022-06-04 PROCEDURE — 25010000002 ONDANSETRON PER 1 MG: Performed by: NURSE ANESTHETIST, CERTIFIED REGISTERED

## 2022-06-04 PROCEDURE — 87635 SARS-COV-2 COVID-19 AMP PRB: CPT | Performed by: EMERGENCY MEDICINE

## 2022-06-04 PROCEDURE — 87070 CULTURE OTHR SPECIMN AEROBIC: CPT | Performed by: SPECIALIST

## 2022-06-04 PROCEDURE — 87205 SMEAR GRAM STAIN: CPT | Performed by: INTERNAL MEDICINE

## 2022-06-04 PROCEDURE — 25010000002 VANCOMYCIN 1 G RECONSTITUTED SOLUTION 1 EACH VIAL: Performed by: INTERNAL MEDICINE

## 2022-06-04 PROCEDURE — 80053 COMPREHEN METABOLIC PANEL: CPT | Performed by: SPECIALIST

## 2022-06-04 PROCEDURE — 87070 CULTURE OTHR SPECIMN AEROBIC: CPT | Performed by: INTERNAL MEDICINE

## 2022-06-04 PROCEDURE — 25010000002 CEFEPIME PER 500 MG: Performed by: INTERNAL MEDICINE

## 2022-06-04 PROCEDURE — 86900 BLOOD TYPING SEROLOGIC ABO: CPT | Performed by: SPECIALIST

## 2022-06-04 PROCEDURE — 25010000002 PROPOFOL 10 MG/ML EMULSION: Performed by: NURSE ANESTHETIST, CERTIFIED REGISTERED

## 2022-06-04 PROCEDURE — 87147 CULTURE TYPE IMMUNOLOGIC: CPT | Performed by: SPECIALIST

## 2022-06-04 PROCEDURE — 87176 TISSUE HOMOGENIZATION CULTR: CPT | Performed by: SPECIALIST

## 2022-06-04 PROCEDURE — 85025 COMPLETE CBC W/AUTO DIFF WBC: CPT | Performed by: SPECIALIST

## 2022-06-04 PROCEDURE — 87086 URINE CULTURE/COLONY COUNT: CPT | Performed by: INTERNAL MEDICINE

## 2022-06-04 PROCEDURE — 10061 I&D ABSCESS COMP/MULTIPLE: CPT | Performed by: SPECIALIST

## 2022-06-04 PROCEDURE — 87205 SMEAR GRAM STAIN: CPT | Performed by: SPECIALIST

## 2022-06-04 RX ORDER — NALOXONE HCL 0.4 MG/ML
0.04 VIAL (ML) INJECTION AS NEEDED
Status: DISCONTINUED | OUTPATIENT
Start: 2022-06-04 | End: 2022-06-04 | Stop reason: HOSPADM

## 2022-06-04 RX ORDER — POLYETHYLENE GLYCOL 3350 17 G/17G
1 POWDER, FOR SOLUTION ORAL DAILY
Status: DISCONTINUED | OUTPATIENT
Start: 2022-06-04 | End: 2022-06-04 | Stop reason: SDUPTHER

## 2022-06-04 RX ORDER — MULTIPLE VITAMINS W/ MINERALS TAB 9MG-400MCG
1 TAB ORAL DAILY
Status: DISCONTINUED | OUTPATIENT
Start: 2022-06-04 | End: 2022-06-14 | Stop reason: HOSPADM

## 2022-06-04 RX ORDER — FAMOTIDINE 20 MG/1
40 TABLET, FILM COATED ORAL DAILY
Status: DISCONTINUED | OUTPATIENT
Start: 2022-06-04 | End: 2022-06-14 | Stop reason: HOSPADM

## 2022-06-04 RX ORDER — NALOXEGOL OXALATE 25 MG/1
25 TABLET, FILM COATED ORAL EVERY MORNING
COMMUNITY
End: 2023-01-11

## 2022-06-04 RX ORDER — ONDANSETRON 4 MG/1
4 TABLET, FILM COATED ORAL EVERY 6 HOURS PRN
Status: DISCONTINUED | OUTPATIENT
Start: 2022-06-04 | End: 2022-06-14 | Stop reason: HOSPADM

## 2022-06-04 RX ORDER — ISOSORBIDE MONONITRATE 60 MG/1
60 TABLET, EXTENDED RELEASE ORAL EVERY MORNING
COMMUNITY
End: 2023-02-03 | Stop reason: SDUPTHER

## 2022-06-04 RX ORDER — DOCUSATE SODIUM 100 MG/1
100 CAPSULE, LIQUID FILLED ORAL 2 TIMES DAILY
Status: DISCONTINUED | OUTPATIENT
Start: 2022-06-04 | End: 2022-06-14 | Stop reason: HOSPADM

## 2022-06-04 RX ORDER — FLUTICASONE PROPIONATE 50 MCG
1 SPRAY, SUSPENSION (ML) NASAL DAILY
Status: DISCONTINUED | OUTPATIENT
Start: 2022-06-04 | End: 2022-06-14 | Stop reason: HOSPADM

## 2022-06-04 RX ORDER — ACETAMINOPHEN 325 MG/1
650 TABLET ORAL EVERY 6 HOURS PRN
COMMUNITY

## 2022-06-04 RX ORDER — LIDOCAINE 4 G/G
PATCH TOPICAL
COMMUNITY

## 2022-06-04 RX ORDER — MAGNESIUM HYDROXIDE 1200 MG/15ML
LIQUID ORAL AS NEEDED
Status: DISCONTINUED | OUTPATIENT
Start: 2022-06-04 | End: 2022-06-04 | Stop reason: HOSPADM

## 2022-06-04 RX ORDER — ONDANSETRON 4 MG/1
4 TABLET, FILM COATED ORAL EVERY 6 HOURS PRN
COMMUNITY
End: 2023-01-11

## 2022-06-04 RX ORDER — LEVOTHYROXINE SODIUM 0.07 MG/1
75 TABLET ORAL
Status: DISCONTINUED | OUTPATIENT
Start: 2022-06-04 | End: 2022-06-14 | Stop reason: HOSPADM

## 2022-06-04 RX ORDER — BARRIER CREAM 200; 4.5 MG/G; MG/G
1 CREAM TOPICAL 2 TIMES DAILY
COMMUNITY
Start: 2023-01-09

## 2022-06-04 RX ORDER — FAMOTIDINE 40 MG/1
40 TABLET, FILM COATED ORAL DAILY
COMMUNITY
End: 2023-02-03 | Stop reason: SDUPTHER

## 2022-06-04 RX ORDER — HYDROMORPHONE HYDROCHLORIDE 1 MG/ML
0.5 INJECTION, SOLUTION INTRAMUSCULAR; INTRAVENOUS; SUBCUTANEOUS
Status: DISCONTINUED | OUTPATIENT
Start: 2022-06-04 | End: 2022-06-04 | Stop reason: HOSPADM

## 2022-06-04 RX ORDER — VALACYCLOVIR HYDROCHLORIDE 500 MG/1
500 TABLET, FILM COATED ORAL DAILY
Status: DISPENSED | OUTPATIENT
Start: 2022-06-04 | End: 2022-06-14

## 2022-06-04 RX ORDER — SODIUM CHLORIDE 0.9 % (FLUSH) 0.9 %
10 SYRINGE (ML) INJECTION AS NEEDED
Status: DISCONTINUED | OUTPATIENT
Start: 2022-06-04 | End: 2022-06-14 | Stop reason: HOSPADM

## 2022-06-04 RX ORDER — FLUMAZENIL 0.1 MG/ML
0.2 INJECTION INTRAVENOUS AS NEEDED
Status: DISCONTINUED | OUTPATIENT
Start: 2022-06-04 | End: 2022-06-04 | Stop reason: HOSPADM

## 2022-06-04 RX ORDER — DULOXETIN HYDROCHLORIDE 30 MG/1
60 CAPSULE, DELAYED RELEASE ORAL DAILY
Status: DISCONTINUED | OUTPATIENT
Start: 2022-06-04 | End: 2022-06-14 | Stop reason: HOSPADM

## 2022-06-04 RX ORDER — DULOXETIN HYDROCHLORIDE 60 MG/1
60 CAPSULE, DELAYED RELEASE ORAL DAILY
COMMUNITY
End: 2023-02-03 | Stop reason: SDUPTHER

## 2022-06-04 RX ORDER — PROPOFOL 10 MG/ML
VIAL (ML) INTRAVENOUS AS NEEDED
Status: DISCONTINUED | OUTPATIENT
Start: 2022-06-04 | End: 2022-06-04 | Stop reason: SURG

## 2022-06-04 RX ORDER — FOLIC ACID 1 MG/1
1 TABLET ORAL DAILY
COMMUNITY
End: 2023-02-03 | Stop reason: SDUPTHER

## 2022-06-04 RX ORDER — ONDANSETRON 2 MG/ML
4 INJECTION INTRAMUSCULAR; INTRAVENOUS EVERY 6 HOURS PRN
Status: DISCONTINUED | OUTPATIENT
Start: 2022-06-04 | End: 2022-06-14 | Stop reason: HOSPADM

## 2022-06-04 RX ORDER — AMOXICILLIN 250 MG
2 CAPSULE ORAL 2 TIMES DAILY
Status: DISCONTINUED | OUTPATIENT
Start: 2022-06-04 | End: 2022-06-14 | Stop reason: HOSPADM

## 2022-06-04 RX ORDER — POLYETHYLENE GLYCOL 3350 17 G/17G
17 POWDER, FOR SOLUTION ORAL DAILY
COMMUNITY

## 2022-06-04 RX ORDER — LIDOCAINE 50 MG/G
1 PATCH TOPICAL
Status: DISCONTINUED | OUTPATIENT
Start: 2022-06-04 | End: 2022-06-14 | Stop reason: HOSPADM

## 2022-06-04 RX ORDER — FENTANYL CITRATE 50 UG/ML
INJECTION, SOLUTION INTRAMUSCULAR; INTRAVENOUS AS NEEDED
Status: DISCONTINUED | OUTPATIENT
Start: 2022-06-04 | End: 2022-06-04 | Stop reason: SURG

## 2022-06-04 RX ORDER — FERROUS GLUCONATE 324(37.5)
324 TABLET ORAL
Status: DISCONTINUED | OUTPATIENT
Start: 2022-06-04 | End: 2022-06-14 | Stop reason: HOSPADM

## 2022-06-04 RX ORDER — TRAMADOL HYDROCHLORIDE 50 MG/1
100 TABLET ORAL 3 TIMES DAILY PRN
Status: DISCONTINUED | OUTPATIENT
Start: 2022-06-04 | End: 2022-06-14 | Stop reason: HOSPADM

## 2022-06-04 RX ORDER — HYDROCODONE BITARTRATE AND ACETAMINOPHEN 7.5; 325 MG/1; MG/1
1 TABLET ORAL EVERY 8 HOURS PRN
Status: DISCONTINUED | OUTPATIENT
Start: 2022-06-04 | End: 2022-06-14 | Stop reason: HOSPADM

## 2022-06-04 RX ORDER — SALSALATE 500 MG
TABLET ORAL
COMMUNITY
End: 2022-06-14 | Stop reason: HOSPADM

## 2022-06-04 RX ORDER — IBUPROFEN 600 MG/1
600 TABLET ORAL ONCE AS NEEDED
Status: DISCONTINUED | OUTPATIENT
Start: 2022-06-04 | End: 2022-06-04 | Stop reason: HOSPADM

## 2022-06-04 RX ORDER — ASPIRIN 81 MG/1
81 TABLET ORAL DAILY
Status: DISCONTINUED | OUTPATIENT
Start: 2022-06-04 | End: 2022-06-14 | Stop reason: HOSPADM

## 2022-06-04 RX ORDER — ONDANSETRON 2 MG/ML
4 INJECTION INTRAMUSCULAR; INTRAVENOUS
Status: DISCONTINUED | OUTPATIENT
Start: 2022-06-04 | End: 2022-06-04 | Stop reason: HOSPADM

## 2022-06-04 RX ORDER — DULOXETIN HYDROCHLORIDE 30 MG/1
30 CAPSULE, DELAYED RELEASE ORAL DAILY
COMMUNITY
End: 2022-06-14 | Stop reason: HOSPADM

## 2022-06-04 RX ORDER — SODIUM CHLORIDE 0.9 % (FLUSH) 0.9 %
10 SYRINGE (ML) INJECTION EVERY 12 HOURS SCHEDULED
Status: DISCONTINUED | OUTPATIENT
Start: 2022-06-04 | End: 2022-06-14 | Stop reason: HOSPADM

## 2022-06-04 RX ORDER — FERROUS SULFATE TAB EC 324 MG (65 MG FE EQUIVALENT) 324 (65 FE) MG
324 TABLET DELAYED RESPONSE ORAL
COMMUNITY
End: 2023-02-03 | Stop reason: SDUPTHER

## 2022-06-04 RX ORDER — LIDOCAINE HYDROCHLORIDE 20 MG/ML
INJECTION, SOLUTION EPIDURAL; INFILTRATION; INTRACAUDAL; PERINEURAL AS NEEDED
Status: DISCONTINUED | OUTPATIENT
Start: 2022-06-04 | End: 2022-06-04 | Stop reason: SURG

## 2022-06-04 RX ORDER — VALACYCLOVIR HYDROCHLORIDE 500 MG/1
500 TABLET, FILM COATED ORAL DAILY
COMMUNITY
End: 2023-02-03 | Stop reason: SDUPTHER

## 2022-06-04 RX ORDER — ISOSORBIDE MONONITRATE 60 MG/1
60 TABLET, EXTENDED RELEASE ORAL DAILY
Status: DISCONTINUED | OUTPATIENT
Start: 2022-06-04 | End: 2022-06-14 | Stop reason: HOSPADM

## 2022-06-04 RX ORDER — NITROGLYCERIN 0.4 MG/1
0.4 TABLET SUBLINGUAL
Status: DISCONTINUED | OUTPATIENT
Start: 2022-06-04 | End: 2022-06-14 | Stop reason: HOSPADM

## 2022-06-04 RX ORDER — FENTANYL CITRATE 50 UG/ML
25 INJECTION, SOLUTION INTRAMUSCULAR; INTRAVENOUS
Status: DISCONTINUED | OUTPATIENT
Start: 2022-06-04 | End: 2022-06-04 | Stop reason: HOSPADM

## 2022-06-04 RX ORDER — ACETAMINOPHEN 325 MG/1
650 TABLET ORAL EVERY 6 HOURS PRN
Status: DISCONTINUED | OUTPATIENT
Start: 2022-06-04 | End: 2022-06-14 | Stop reason: HOSPADM

## 2022-06-04 RX ORDER — SACCHAROMYCES BOULARDII 250 MG
250 CAPSULE ORAL DAILY
Status: DISCONTINUED | OUTPATIENT
Start: 2022-06-04 | End: 2022-06-14 | Stop reason: HOSPADM

## 2022-06-04 RX ORDER — POLYETHYLENE GLYCOL 3350 17 G/17G
17 POWDER, FOR SOLUTION ORAL DAILY
Status: DISCONTINUED | OUTPATIENT
Start: 2022-06-04 | End: 2022-06-14 | Stop reason: HOSPADM

## 2022-06-04 RX ORDER — SUCRALFATE 1 G/1
1 TABLET ORAL
Status: DISCONTINUED | OUTPATIENT
Start: 2022-06-04 | End: 2022-06-14 | Stop reason: HOSPADM

## 2022-06-04 RX ORDER — DROPERIDOL 2.5 MG/ML
0.62 INJECTION, SOLUTION INTRAMUSCULAR; INTRAVENOUS ONCE AS NEEDED
Status: DISCONTINUED | OUTPATIENT
Start: 2022-06-04 | End: 2022-06-04 | Stop reason: HOSPADM

## 2022-06-04 RX ORDER — FUROSEMIDE 40 MG/1
40 TABLET ORAL DAILY
Status: DISCONTINUED | OUTPATIENT
Start: 2022-06-04 | End: 2022-06-14 | Stop reason: HOSPADM

## 2022-06-04 RX ORDER — CARVEDILOL 25 MG/1
25 TABLET ORAL 2 TIMES DAILY WITH MEALS
Status: DISCONTINUED | OUTPATIENT
Start: 2022-06-04 | End: 2022-06-14 | Stop reason: HOSPADM

## 2022-06-04 RX ORDER — ASCORBIC ACID 500 MG
500 TABLET ORAL DAILY
Status: DISCONTINUED | OUTPATIENT
Start: 2022-06-04 | End: 2022-06-14 | Stop reason: HOSPADM

## 2022-06-04 RX ORDER — ONDANSETRON 4 MG/1
4 TABLET, FILM COATED ORAL EVERY 6 HOURS PRN
Status: DISCONTINUED | OUTPATIENT
Start: 2022-06-04 | End: 2022-06-04

## 2022-06-04 RX ORDER — OXYCODONE AND ACETAMINOPHEN 10; 325 MG/1; MG/1
1 TABLET ORAL ONCE AS NEEDED
Status: DISCONTINUED | OUTPATIENT
Start: 2022-06-04 | End: 2022-06-04 | Stop reason: HOSPADM

## 2022-06-04 RX ORDER — SODIUM CHLORIDE, SODIUM LACTATE, POTASSIUM CHLORIDE, CALCIUM CHLORIDE 600; 310; 30; 20 MG/100ML; MG/100ML; MG/100ML; MG/100ML
50 INJECTION, SOLUTION INTRAVENOUS CONTINUOUS
Status: DISCONTINUED | OUTPATIENT
Start: 2022-06-04 | End: 2022-06-10

## 2022-06-04 RX ORDER — LABETALOL HYDROCHLORIDE 5 MG/ML
5 INJECTION, SOLUTION INTRAVENOUS
Status: DISCONTINUED | OUTPATIENT
Start: 2022-06-04 | End: 2022-06-04 | Stop reason: HOSPADM

## 2022-06-04 RX ORDER — ONDANSETRON 2 MG/ML
INJECTION INTRAMUSCULAR; INTRAVENOUS AS NEEDED
Status: DISCONTINUED | OUTPATIENT
Start: 2022-06-04 | End: 2022-06-04 | Stop reason: SURG

## 2022-06-04 RX ADMIN — LIDOCAINE 1 PATCH: 50 PATCH CUTANEOUS at 10:26

## 2022-06-04 RX ADMIN — LIDOCAINE HYDROCHLORIDE 100 MG: 20 INJECTION, SOLUTION EPIDURAL; INFILTRATION; INTRACAUDAL; PERINEURAL at 08:11

## 2022-06-04 RX ADMIN — CEFEPIME 2 G: 2 INJECTION, POWDER, FOR SOLUTION INTRAVENOUS at 18:41

## 2022-06-04 RX ADMIN — OXYCODONE HYDROCHLORIDE AND ACETAMINOPHEN 500 MG: 500 TABLET ORAL at 10:24

## 2022-06-04 RX ADMIN — ASPIRIN 81 MG: 81 TABLET, COATED ORAL at 10:24

## 2022-06-04 RX ADMIN — CEFEPIME 2 G: 2 INJECTION, POWDER, FOR SOLUTION INTRAVENOUS at 11:44

## 2022-06-04 RX ADMIN — CARVEDILOL 25 MG: 25 TABLET, FILM COATED ORAL at 10:24

## 2022-06-04 RX ADMIN — MAGNESIUM GLUCONATE 500 MG ORAL TABLET 400 MG: 500 TABLET ORAL at 10:23

## 2022-06-04 RX ADMIN — CEFEPIME HYDROCHLORIDE 2 G: 2 INJECTION, POWDER, FOR SOLUTION INTRAVENOUS at 03:01

## 2022-06-04 RX ADMIN — ISOSORBIDE MONONITRATE 60 MG: 60 TABLET, EXTENDED RELEASE ORAL at 10:23

## 2022-06-04 RX ADMIN — Medication 10 ML: at 20:53

## 2022-06-04 RX ADMIN — FENTANYL CITRATE 100 MCG: 50 INJECTION, SOLUTION INTRAMUSCULAR; INTRAVENOUS at 08:20

## 2022-06-04 RX ADMIN — Medication 324 MG: at 10:23

## 2022-06-04 RX ADMIN — SUCRALFATE 1 G: 1 TABLET ORAL at 11:44

## 2022-06-04 RX ADMIN — SUCRALFATE 1 G: 1 TABLET ORAL at 20:53

## 2022-06-04 RX ADMIN — Medication 10 ML: at 10:24

## 2022-06-04 RX ADMIN — THIAMINE HCL TAB 100 MG 100 MG: 100 TAB at 10:23

## 2022-06-04 RX ADMIN — DOCUSATE SODIUM 100 MG: 100 CAPSULE, LIQUID FILLED ORAL at 10:24

## 2022-06-04 RX ADMIN — DOCUSATE SODIUM 50 MG AND SENNOSIDES 8.6 MG 2 TABLET: 8.6; 5 TABLET, FILM COATED ORAL at 10:23

## 2022-06-04 RX ADMIN — FUROSEMIDE 40 MG: 40 TABLET ORAL at 10:24

## 2022-06-04 RX ADMIN — POLYETHYLENE GLYCOL 3350 17 G: 17 POWDER, FOR SOLUTION ORAL at 10:25

## 2022-06-04 RX ADMIN — CARVEDILOL 25 MG: 25 TABLET, FILM COATED ORAL at 17:04

## 2022-06-04 RX ADMIN — VANCOMYCIN HYDROCHLORIDE 1000 MG: 1 INJECTION, POWDER, LYOPHILIZED, FOR SOLUTION INTRAVENOUS at 17:00

## 2022-06-04 RX ADMIN — VANCOMYCIN HYDROCHLORIDE 1000 MG: 1 INJECTION, POWDER, LYOPHILIZED, FOR SOLUTION INTRAVENOUS at 05:14

## 2022-06-04 RX ADMIN — Medication 1 TABLET: at 10:23

## 2022-06-04 RX ADMIN — SODIUM CHLORIDE, POTASSIUM CHLORIDE, SODIUM LACTATE AND CALCIUM CHLORIDE 50 ML/HR: 600; 310; 30; 20 INJECTION, SOLUTION INTRAVENOUS at 03:01

## 2022-06-04 RX ADMIN — VALACYCLOVIR HYDROCHLORIDE 500 MG: 500 TABLET, FILM COATED ORAL at 10:23

## 2022-06-04 RX ADMIN — COLLAGENASE SANTYL 1 APPLICATION: 250 OINTMENT TOPICAL at 10:26

## 2022-06-04 RX ADMIN — ONDANSETRON 8 MG: 2 INJECTION INTRAMUSCULAR; INTRAVENOUS at 08:34

## 2022-06-04 RX ADMIN — PROPOFOL 150 MG: 10 INJECTION, EMULSION INTRAVENOUS at 08:12

## 2022-06-04 RX ADMIN — LEVOTHYROXINE SODIUM 75 MCG: 75 TABLET ORAL at 05:14

## 2022-06-04 RX ADMIN — DULOXETINE HYDROCHLORIDE 60 MG: 30 CAPSULE, DELAYED RELEASE ORAL at 10:24

## 2022-06-04 RX ADMIN — SUCRALFATE 1 G: 1 TABLET ORAL at 17:00

## 2022-06-04 RX ADMIN — DOCUSATE SODIUM 100 MG: 100 CAPSULE, LIQUID FILLED ORAL at 20:53

## 2022-06-04 RX ADMIN — FLUTICASONE PROPIONATE 1 SPRAY: 50 SPRAY, METERED NASAL at 10:24

## 2022-06-04 RX ADMIN — SUCRALFATE 1 G: 1 TABLET ORAL at 05:14

## 2022-06-04 RX ADMIN — DOCUSATE SODIUM 50 MG AND SENNOSIDES 8.6 MG 2 TABLET: 8.6; 5 TABLET, FILM COATED ORAL at 20:52

## 2022-06-04 RX ADMIN — Medication 250 MG: at 10:23

## 2022-06-04 RX ADMIN — FAMOTIDINE 40 MG: 20 TABLET, FILM COATED ORAL at 10:23

## 2022-06-04 NOTE — ANESTHESIA PREPROCEDURE EVALUATION
Anesthesia Evaluation     Patient summary reviewed and Nursing notes reviewed   NPO Solid Status: > 8 hours  NPO Liquid Status: > 8 hours           Airway   Mallampati: III  TM distance: >3 FB  Neck ROM: full  No difficulty expected  Dental - normal exam     Pulmonary    (+) pleural effusion, COPD mild, asthma,decreased breath sounds, rales,   Cardiovascular - normal exam  Exercise tolerance: poor (<4 METS)    (+) hypertension poorly controlled, past MI  >12 months, CAD, hyperlipidemia,       Neuro/Psych  (+) syncope, psychiatric history Depression,    GI/Hepatic/Renal/Endo    (+) obesity,   thyroid problem     Musculoskeletal     (+) back pain,   Abdominal   (+) obese,    Substance History      OB/GYN          Other   arthritis,      ROS/Med Hx Other: Has history SSS with pacemaker                Anesthesia Plan    ASA 3 - emergent     general     intravenous induction     Anesthetic plan, all risks, benefits, and alternatives have been provided, discussed and informed consent has been obtained with: patient.        CODE STATUS:    Medical Intervention Limits: NO intubation (DNI)  Level Of Support Discussed With: Patient  Code Status (Patient has no pulse and is not breathing): No CPR (Do Not Attempt to Resuscitate)  Medical Interventions (Patient has pulse or is breathing): Limited Support

## 2022-06-04 NOTE — CASE MANAGEMENT/SOCIAL WORK
Discharge Planning Assessment  [facility]     Patient Name: Tawny Shin  MRN: [id number]  Today's Date: 6/4/2022    Admit Date: 6/3/2022     Discharge Needs Assessment     Row Name 06/04/22 0806       Living Environment    People in Home facility resident    Current Living Arrangements extended care facility    Primary Care Provided by other (see comments)    Provides Primary Care For no one    Family Caregiver if Needed child(mary jo), adult    Quality of Family Relationships helpful    Able to Return to Prior Arrangements yes       Resource/Environmental Concerns    Resource/Environmental Concerns none    Transportation Concerns none       Transition Planning    Patient/Family Anticipates Transition to long-term care facility    Patient/Family Anticipated Services at Transition none    Transportation Anticipated health plan transportation       Discharge Needs Assessment    Readmission Within the Last 30 Days no previous admission in last 30 days    Current Outpatient/Agency/Support Group skilled nursing facility    Anticipated Changes Related to Illness none    Equipment Needed After Discharge none    Outpatient/Agency/Support Group Needs skilled nursing facility    Discharge Facility/Level of Care Needs nursing facility, skilled    Discharge Coordination/Progress PT is a current resident of [facility]. SW has attempted to verify bed hold status, awaiting response. Will follow and assist as needed.               Discharge Plan    No documentation.               Continued Care and Services - Admitted Since 6/3/2022    Coordination has not been started for this encounter.          Demographic Summary    No documentation.                Functional Status    No documentation.                Psychosocial    No documentation.                Abuse/Neglect    No documentation.                Legal    No documentation.                Substance Abuse    No documentation.                Patient Forms    No  documentation.                   Emilee Delgadillo, CSW

## 2022-06-04 NOTE — ANESTHESIA PROCEDURE NOTES
Airway  Date/Time: 6/4/2022 8:13 AM  Airway not difficult    General Information and Staff    Patient location during procedure: OR  CRNA/CAA: Robb Casas CRNA    Indications and Patient Condition  Indications for airway management: airway protection    Preoxygenated: yes  Mask difficulty assessment: 0 - not attempted    Final Airway Details  Final airway type: supraglottic airway      Successful airway: Supreme  Size 3    Number of attempts at approach: 1  Assessment: lips, teeth, and gum same as pre-op and atraumatic intubation

## 2022-06-04 NOTE — PLAN OF CARE
Goal Outcome Evaluation:  Plan of Care Reviewed With: patient        Progress: no change  Outcome Evaluation: Pt is AOx4 this shift, to OR this AM for I&D right hip, dressing is CDI, no drainage note, bno complaints of pain, repositioned, FC draining delores yellow urine to BSD, IVFs infusing, IV antibiotics given per MD order, Unna boots to BLE. Safety maintained.

## 2022-06-04 NOTE — CONSULTS
"Pharmacy Dosing Service  Pharmacokinetics  Vancomycin Initial Evaluation  Assessment/Action/Plan:  Loading dose: None  Current Order: Vancomycin 1,000 mg IVPB every 12 hours  Current end date/final dose: 6/10/22 at 1600  Levels: None ordered at this time  Additional antimicrobial agent(s): Cefepime    Vancomycin dosage initiated based on population pharmacokinetic parameters. Pharmacy will continue to follow daily and adjust dose accordingly.     Subjective:  Tawny Shin is a 66 y.o. female with a Vancomycin \"Pharmacy to Dose\" consult for the treatment of SSTI (Right hip abscess, BLE non-healing wounds) , day 1 of treatment.    AUC Model Data:  Regimen: 1000 mg IV every 12 hours.  Start time: 04:00 on 06/04/2022  Exposure target: AUC24 (range)400-600 mg/L.hr   AUC24,ss: 573 mg/L.hr  PAUC*: 86 %  Ctrough,ss: 18.9 mg/L  Pconc*: 44 %  Tox.: 15 %    Objective:  Ht: 167.6 cm (66\"); Wt: 81.1 kg (178 lb 12.7 oz)  Estimated Creatinine Clearance: 68.8 mL/min (by C-G formula based on SCr of 0.84 mg/dL).   Creatinine   Date Value Ref Range Status   06/03/2022 0.84 0.57 - 1.00 mg/dL Final      Lab Results   Component Value Date    WBC 7.76 06/03/2022      Baseline culture results:  Microbiology Results (last 10 days)       Procedure Component Value - Date/Time    COVID PRE-OP / PRE-PROCEDURE SCREENING ORDER (NO ISOLATION) - Swab, Nasal Cavity [[id number]]  (Normal) Collected: 06/04/22 0126    Lab Status: Final result Specimen: Swab from Nasal Cavity Updated: 06/04/22 0208    Narrative:      The following orders were created for panel order COVID PRE-OP / PRE-PROCEDURE SCREENING ORDER (NO ISOLATION) - Swab, Nasal Cavity.  Procedure                               Abnormality         Status                     ---------                               -----------         ------                     COVID-19,[facility]...[[id number]]  Normal              Final result                 Please view results for these tests on the " individual orders.    COVID-19,[facility] IN-HOUSE,Nasal Swab No Transport Media 3-4 HR TAT - Swab, Nasal Cavity [[id number]]  (Normal) Collected: 06/04/22 0126    Lab Status: Final result Specimen: Swab from Nasal Cavity Updated: 06/04/22 0208     COVID19 Not Detected    Narrative:      Fact sheet for providers: https://www.fda.gov/media/086578/download     Fact sheet for patients: https://www.fda.gov/media/631935/download    Test performed by PCR.    Consider negative results in combination with clinical observations, patient history, and epidemiological information.            Segundo Biggs, PharmD  06/04/22 03:35 CDT

## 2022-06-04 NOTE — ANESTHESIA POSTPROCEDURE EVALUATION
Patient: Tawny Shin    Procedure Summary     Date: 06/04/22 Room / Location: [facility] OR 09 /  [facility] OR    Anesthesia Start: 0807 Anesthesia Stop: 0848    Procedure: INCISION AND DRAINAGE ABSCESS right hip (Right ) Diagnosis:       Abscess of right hip      (Abscess of right hip [L02.415])    Surgeons: Magda Salcido MD Provider: Robb Casas CRNA    Anesthesia Type: general ASA Status: 3 - Emergent          Anesthesia Type: general    Vitals  Vitals Value Taken Time   /47 06/04/22 0920   Temp 97.8 °F (36.6 °C) 06/04/22 0920   Pulse 63 06/04/22 0920   Resp 16 06/04/22 0920   SpO2 98 % 06/04/22 0920           Post Anesthesia Care and Evaluation    Patient location during evaluation: PACU  Patient participation: complete - patient participated  Level of consciousness: awake  Pain score: 1  Pain management: adequate  Airway patency: patent  Anesthetic complications: No anesthetic complications  PONV Status: none  Cardiovascular status: acceptable  Respiratory status: acceptable  Hydration status: acceptable

## 2022-06-04 NOTE — PLAN OF CARE
Goal Outcome Evaluation:  Patient is alert and oriented x 4. Bedbound. Room air. Denies chest pain/shortness of air. IV fluids and antibiotics as ordered. Wound culture obtained and sent to lab. Floating heels. Shea boots in place. Right hip wound has moderate amount of serosangious drainage. Dry gauze dressing placed. Q2 turns. Fall precautions with bed alarm set. Sleeping in between care.

## 2022-06-04 NOTE — OP NOTE
Preoperative diagnosis:  Right hip abscess  Postoperative diagnosis:  Same  Procedure:  Incision and drainage right hip abscess; pulse irrigation   Surgeon: Magda Salcido MD  Anesthesia:  Get  Ebl:  Minimal  Ivf:  See anesthesia  Indications: the patient is a 66-year-old female who presents with a history of a chronic nonhealing wound at the right hip.  She has been found to have erythema, pain, and discharge.  CT demonstrated an inflamed fluid collection.  She presents for drainage.  The risks, benefits, complications, and possible alternatives were discussed with the patient who agreed to proceed.  Description of procedure: the patient was laid supine. The right hip was prepped and draped.  The midportion of the previous incisional scar was open with expressible serosanguinous discharge.  The surrounding skin was erythematous and edematous.  The opened was enlarged.  Cultures were taken. A small amount of the the wall had necrotic tissue which was debrided with bovie.  There was a large cavity. It was copiously irrigated with 3 liters of sterile saline with the pulse .  The wound was then packed with saline soaked kerlix roll and dry dressings were applied.  The sponge, needle, and instrument counts were correct.  Complications: none  Disposition: good to pacu  Findings:  Large cavity with surrounding eryhthema and edema, small amount of necrotic tissue

## 2022-06-04 NOTE — CONSULTS
Magda Salcido MD FACS Consult Note    Referring Provider: Provider, No Known    Patient Care Team:  Leta Julian DO as PCP - General (Family Medicine)  Moises Holman MD as Consulting Physician (Otolaryngology)  Christiano Rao PA as Physician Assistant (Otolaryngology)  Candelaria David MD as Obstetrician (Obstetrics and Gynecology)  Omar Hernandez MD as Cardiologist (Cardiology)  Leta Crawford APRN as Nurse Practitioner (Nurse Practitioner)    Chief complaint abscess right hip    Subjective .     History of present illness:  The patient is a 66-year-old female who has a significant past medical and surgical history including operative repair of a right hip labral tear.  Her care was complicated by an infected and nonhealing wound.  She was subsequently seen by Dr. Turner with plastic surgery and has undergone I and D of a seroma and per the patient flap coverage.  She now presents complaining of pain, erythema, and discharge from an opening at the previous incision.  CT was perform in the ED and it demonstrated an inflamed fluid collection in the subcutaneous tissues of the right lateral hip.  Dr. Turner is not taking call this weekend and general surgery was consulted for assistance while he is gone.        History  Past Medical History:   Diagnosis Date   • Age-related osteoporosis with current pathological fracture 5/27/2020   • Arthritis    • Asthma    • Bilateral bunions 12/23/2020   • Cancer ([facility])    • Cardiac pacemaker syndrome 12/23/2020    Overview:  - heart block - implanted 11/16   • Charcot's joint of foot, left 12/23/2020   • Chronic deep vein thrombosis (DVT) of right lower extremity (HCC) 6/23/2021   • Chronic pain syndrome 6/22/2021   • Chronic sinusitis    • COPD (chronic obstructive pulmonary disease) ([facility])    • Coronary artery disease    • 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   • Elevated cholesterol    • Eustachian tube dysfunction    • Heart disease    •  "Herpes simplex    • History of transfusion    • Hyperlipidemia    • Hypertension    • Hypothyroidism 12/23/2020   • Intrinsic asthma 12/23/2020   • Knee dislocation    • Labral tear of right hip joint    • Laryngitis sicca    • Laryngitis, chronic    • Left carotid bruit 3/9/2016   • MI (myocardial infarction) ([facility])    • Myalgia due to statin 6/25/2019   • Open wound of right hip 9/14/2021   • Osteomyelitis of right femur ([facility]) 7/6/2021   • Otorrhea    • Pacemaker 11/17/2016   • Primary osteoarthritis of left knee 12/23/2020   • Psoriasis vulgaris 12/23/2020   • S/P coronary artery stent placement 3/9/2016   • Sensorineural hearing loss    • Seropositive rheumatoid arthritis of multiple sites ([facility]) 12/27/2019    Overview:  -myochrysine '93-'96 -methotrexate '96--->11/98;r/s  restarted 2/99--> 8/14 (anemia) -sulfasalazine- not effective -penicillamine 6/98-->10/98; no effect -leflunomide 11/98--> - Humira '13-->didn't take - Enbrel 12/14-->3/15- no effect!   Last Assessment & Plan:  - \"aching all over\" because she had to be off her anti-rheumatic drugs for 2 weeks in preparation for her R knee surgery - he   • Sick sinus syndrome ([facility]) 12/27/2019   • Sjogren's disease ([facility])    • Spondylolisthesis of lumbar region 1/17/2018   • Syncope, recurrent 2/8/2021   ,   Past Surgical History:   Procedure Laterality Date   • A-V CARDIAC PACEMAKER INSERTION  2016   • ATRIAL CARDIAC PACEMAKER INSERTION     • CARDIAC CATHETERIZATION     • CATARACT EXTRACTION     • CERVICAL CORPECTOMY N/A 3/3/2021    Procedure: CERVICAL 6 CORPECTOMY WITH TITANIUM CAGE WITH NEURO MONITORING;  Surgeon: Bandar Shea MD;  Location: [facility];  Service: Neurosurgery;  Laterality: N/A;   • COLONOSCOPY  11/08/2011    One fold in the ascending colon which showed ulcer otherwise normal exam   • COLONOSCOPY  11/12/2004    Normal exam repeat in five years   • CORONARY ANGIOPLASTY WITH STENT PLACEMENT      X 2; 2013 & 2014   • ENDOSCOPY  07/10/2014 "    Normal exam   • FLAP LEG Right 9/14/2021    Procedure: RIGHT GLUTEAL FASCIOCUTANEOUS ADVANCEMENT FLAP AND RIGHT TENSOR FASCIAL JESSICA FLAP;  Surgeon: Amadeo Turner MD;  Location:  [facility];  Service: Plastics;  Laterality: Right;   • HIP ABDUCTION TENOTOMY BILATERAL Right 1/14/2021    Procedure: RIGHT HIP GLUTEUS MEDLUS / MINIMUS REPAIR, POSSIBLE ACHILLES ALLOGRAFT;  Surgeon: Nino Carlson MD;  Location:  [facility];  Service: Orthopedics;  Laterality: Right;   • INCISION AND DRAINAGE HIP Right 2/9/2021    Procedure: HIP INCISION AND DRAINAGE;  Surgeon: Nino Carlson MD;  Location:  [facility];  Service: Orthopedics;  Laterality: Right;   • INCISION AND DRAINAGE LEG Right 10/24/2021    Procedure: INCISION AND DRAINAGE LOWER EXTREMITY;  Surgeon: Amadeo Turner MD;  Location:  [facility];  Service: Plastics;  Laterality: Right;   • INCISION AND DRAINAGE OF WOUND Right 7/8/2021    Procedure: INCISION AND DRAINAGE WOUND RIGHT HIP;  Surgeon: James Huntley MD;  Location:  [facility];  Service: Orthopedics;  Laterality: Right;   • JOINT REPLACEMENT     • KYPHOPLASTY WITH BIOPSY Bilateral 10/26/2021    Procedure: THOARCIC 12 KYPHOPLASTY WITH BIOPSY;  Surgeon: Bandar Shea MD;  Location:  [facility];  Service: Neurosurgery;  Laterality: Bilateral;   • LEG DEBRIDEMENT Right 9/14/2021    Procedure: DEBRIDEMENT OF RIGHT HIP WOUND, RIGHT GLUTEAL FASCIOCUTANEOUS ADVANCEMENT FLAP AND RIGHT TENSOR FASCIAL JESSICA FLAP;  Surgeon: Amadeo Turner MD;  Location:  [facility];  Service: Plastics;  Laterality: Right;   • LUMBAR DISCECTOMY Right 3/23/2021    Procedure: LUMBAR DISCECTOMY MICRO, Lumbar 1/2 right;  Surgeon: Bandar Shea MD;  Location:  [facility];  Service: Neurosurgery;  Laterality: Right;   • LUMBAR FUSION N/A 1/19/2018    Procedure: L3-4,L4-5 DECOMPRESSION, POSTERIOR SPINAL FUSION WITH INSTRUMENTATION;  Surgeon: Fortino Oropeza MD;  Location:  [facility];  Service:    • LUMBAR LAMINECTOMY WITH  FUSION Left 1/17/2018    Procedure: LEFT L3-4 L4-5 LATERAL LUMBAR INTERBODY FUSION;  Surgeon: Fortino Oropeza MD;  Location: [facility];  Service:    • MYRINGOTOMY W/ TUBES  09/04/2014    TUBES NO LONGER IN PLACE   • OTHER SURGICAL HISTORY      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 so hardware was removed and spacers were placed   • REPLACEMENT TOTAL KNEE Right    ,   Family History   Problem Relation Age of Onset   • Cancer Mother    • Heart disease Father    ,   Social History     Tobacco Use   • Smoking status: Never Smoker   • Smokeless tobacco: Never Used   Vaping Use   • Vaping Use: Never used   Substance Use Topics   • Alcohol use: No   • Drug use: Never   ,   Medications Prior to Admission   Medication Sig Dispense Refill Last Dose   • ACETAMINOPHEN PO Take 650 mg by mouth Every 6 (Six) Hours As Needed (mild pain).      • Alirocumab 75 MG/ML solution auto-injector Inject 1 mL under the skin into the appropriate area as directed Every 14 (Fourteen) Days. 2 pen 11 Past Month at Unknown time   • apixaban (ELIQUIS) 5 MG tablet tablet Take 1 tablet by mouth 2 (Two) Times a Day. 60 tablet  6/4/2022 at Unknown time   • ascorbic acid (VITAMIN C) 500 MG tablet Take 500 mg by mouth Daily.   6/4/2022 at Unknown time   • aspirin 81 MG EC tablet Take 81 mg by mouth Daily.   6/4/2022 at Unknown time   • carvedilol (COREG) 25 MG tablet Take 1 tablet by mouth 2 (Two) Times a Day With Meals. 180 tablet 3 6/4/2022 at Unknown time   • Certolizumab Pegol (CIMZIA PREFILLED SC) Inject  under the skin into the appropriate area as directed Every 30 (Thirty) Days.   Past Month at Unknown time   • collagenase 250 UNIT/GM ointment Apply 1 application topically to the appropriate area as directed Daily.   6/4/2022 at Unknown time   • Evolocumab (REPATHA) solution prefilled syringe injection Inject 140 mg under the skin into the appropriate area as directed Every 14 (Fourteen) Days.   Past Month at Unknown time   • Diclofenac Sodium  (VOLTAREN) 1 % gel gel Apply 4 g topically to the appropriate area as directed 4 (Four) Times a Day As Needed.      • docusate sodium 100 MG capsule Take 1 capsule by mouth 2 (Two) Times a Day.      • DULoxetine (CYMBALTA) 60 MG capsule Take 1 capsule by mouth Daily.      • famotidine (PEPCID) 40 MG tablet Take 40 mg by mouth Daily.      • ferrous sulfate 324 (65 Fe) MG tablet delayed-release EC tablet Take 324 mg by mouth Daily With Breakfast.      • fluticasone (FLONASE) 50 MCG/ACT nasal spray 1 spray into the nostril(s) as directed by provider Daily. 18.2 mL 5    • furosemide (LASIX) 40 MG tablet Take 40 mg by mouth Daily.      • HYDROcodone-acetaminophen (NORCO) 7.5-325 MG per tablet Take 1 tablet by mouth Every 8 (Eight) Hours As Needed for Moderate Pain . 9 tablet 0    • Hydrocortisone (sam's amazing butt) cream Apply 1 application topically to the appropriate area as directed As Needed. Recipe Contains: 1:1:1:1 of hydrocortisone 1% oint, bacitracin 500 unit/gram oint, zinc 20% oint, nystatin 100,000 unit/gram oint      • isosorbide mononitrate (IMDUR) 60 MG 24 hr tablet Take 1 tablet by mouth Daily.      • levothyroxine (SYNTHROID, LEVOTHROID) 75 MCG tablet Take 1 tablet by mouth Daily. 90 tablet 3    • magnesium hydroxide (MILK OF MAGNESIA) 400 MG/5ML suspension Take 30 mL by mouth Daily As Needed for Constipation.      • magnesium oxide (MAG-OX) 400 MG tablet Take 400 mg by mouth Daily.      • Menthol-Zinc Oxide 0.45-20 % ointment Apply 1 application topically. Apply to buttock      • methylPREDNISolone (MEDROL) 4 MG dose pack Take as directed on package instructions. 21 tablet 0    • multivitamin with minerals tablet tablet Take 1 tablet by mouth Daily.      • Naloxegol Oxalate (Movantik) 25 MG tablet Take 25 mg by mouth Every Morning.      • nitroglycerin (Nitrostat) 0.4 MG SL tablet Place 1 tablet under the tongue Every 5 (Five) Minutes As Needed for Chest Pain. Take no more than 3 doses in 15 minutes.   12    • ondansetron (ZOFRAN) 4 MG tablet Take 4 mg by mouth Every 6 (Six) Hours As Needed for Nausea or Vomiting.      • polyethylene glycol (GlycoLax) 17 GM/SCOOP powder Take 17 g by mouth Daily.      • saccharomyces boulardii (Florastor) 250 MG capsule Take 1 capsule by mouth Daily. 90 capsule 3    • salsalate (DISALCID) 500 MG tablet Take 500 mg by mouth. Take 5 tablets Monday Wednesday and Friday and give 4 tablets daily Tuesday, Thursday, Saturday, and Sunday for mild pain      • sennosides-docusate (PERICOLACE) 8.6-50 MG per tablet Take 2 tablets by mouth 2 (Two) Times a Day.      • sucralfate (CARAFATE) 1 g tablet Take 1 g by mouth 4 (Four) Times a Day Before Meals & at Bedtime.      • Thiamine HCl (VITAMIN B-1 PO) Take 100 mg by mouth Daily.      • traMADol (ULTRAM) 50 MG tablet Take 100 mg by mouth 3 (Three) Times a Day As Needed for Moderate Pain .      • valACYclovir (VALTREX) 500 MG tablet Take 500 mg by mouth Daily.       and Allergies:  Atorvastatin, Amoxicillin, Escitalopram, Nabumetone, Niacin er, Penicillin g, Penicillins, and Simvastatin    Current Facility-Administered Medications:   •  acetaminophen (TYLENOL) tablet 650 mg, 650 mg, Oral, Q6H PRN, Senthil Paul MD  •  ascorbic acid (VITAMIN C) tablet 500 mg, 500 mg, Oral, Daily, Senthil Paul MD  •  aspirin EC tablet 81 mg, 81 mg, Oral, Daily, Senthil Paul MD  •  carvedilol (COREG) tablet 25 mg, 25 mg, Oral, BID With Meals, Senthil Paul MD  •  cefepime (MAXIPIME) 2 g/100 mL 0.9% NS (mbp), 2 g, Intravenous, Q8H, Senthil Paul MD  •  collagenase ointment 1 application, 1 application, Topical, Q24H, Senthil Paul MD  •  Diclofenac Sodium (VOLTAREN) 1 % gel 4 g, 4 g, Topical, 4x Daily PRN, Senthil Paul MD  •  docusate sodium (COLACE) capsule 100 mg, 100 mg, Oral, BID, Senthil Paul MD  •  DULoxetine (CYMBALTA) DR capsule 60 mg, 60 mg, Oral, Daily, Senthil Paul MD  •  famotidine (PEPCID) tablet 40 mg,  40 mg, Oral, Daily, Senthil Paul MD  •  ferrous gluconate tablet 324 mg, 324 mg, Oral, Daily With Breakfast, Senthil Paul MD  •  fluticasone (FLONASE) 50 MCG/ACT nasal spray 1 spray, 1 spray, Nasal, Daily, Senthil Paul MD  •  furosemide (LASIX) tablet 40 mg, 40 mg, Oral, Daily, Senthil Paul MD  •  HYDROcodone-acetaminophen (NORCO) 7.5-325 MG per tablet 1 tablet, 1 tablet, Oral, Q8H PRN, Senthil Paul MD  •  hydrocortisone-bacitracin-zinc oxide-nystatin (MAGIC BARRIER) ointment 1 application, 1 application, Topical, PRN, Senthil Paul MD  •  isosorbide mononitrate (IMDUR) 24 hr tablet 60 mg, 60 mg, Oral, Daily, Senthil Paul MD  •  lactated ringers infusion, 50 mL/hr, Intravenous, Continuous, Senthil Paul MD, Last Rate: 50 mL/hr at 06/04/22 0301, 50 mL/hr at 06/04/22 0301  •  levothyroxine (SYNTHROID, LEVOTHROID) tablet 75 mcg, 75 mcg, Oral, Q AM, Senthil Paul MD, 75 mcg at 06/04/22 0514  •  lidocaine (LIDODERM) 5 % 1 patch, 1 patch, Transdermal, Q24H, Senthil Paul MD  •  magnesium hydroxide (MILK OF MAGNESIA) suspension 10 mL, 10 mL, Oral, Daily PRN, Senthil Paul MD  •  magnesium oxide (MAG-OX) tablet 400 mg, 400 mg, Oral, Daily, Senthil Paul MD  •  Menthol-Zinc Oxide, , Topical, BID, Senthil Paul MD  •  methylnaltrexone (RELISTOR) injection 12 mg, 12 mg, Subcutaneous, Every Other Day, Senthil Paul MD  •  multivitamin with minerals 1 tablet, 1 tablet, Oral, Daily, Senthil Paul MD  •  nitroglycerin (NITROSTAT) SL tablet 0.4 mg, 0.4 mg, Sublingual, Q5 Min PRN, Senthil Paul MD  •  ondansetron (ZOFRAN) tablet 4 mg, 4 mg, Oral, Q6H PRN **OR** ondansetron (ZOFRAN) injection 4 mg, 4 mg, Intravenous, Q6H PRN, Senthil Paul MD  •  polyethylene glycol (MIRALAX) packet 17 g, 17 g, Oral, Daily, Senthil Paul MD  •  saccharomyces boulardii (FLORASTOR) capsule 250 mg, 250 mg, Oral, Daily, Senthil Paul MD  •   sennosides-docusate (PERICOLACE) 8.6-50 MG per tablet 2 tablet, 2 tablet, Oral, BID, Senthil Paul MD  •  [COMPLETED] Insert peripheral IV, , , Once **AND** sodium chloride 0.9 % flush 10 mL, 10 mL, Intravenous, PRN, Senthil Paul MD  •  sodium chloride 0.9 % flush 10 mL, 10 mL, Intravenous, Q12H, Senthil Paul MD  •  sodium chloride 0.9 % flush 10 mL, 10 mL, Intravenous, PRN, Senthil Paul MD  •  sucralfate (CARAFATE) tablet 1 g, 1 g, Oral, 4x Daily AC & at Bedtime, Senthil Paul MD, 1 g at 06/04/22 0514  •  thiamine (VITAMIN B-1) tablet 100 mg, 100 mg, Oral, Daily, Senthil Paul MD  •  traMADol (ULTRAM) tablet 100 mg, 100 mg, Oral, TID PRN, Senthil Paul MD  •  valACYclovir (VALTREX) tablet 500 mg, 500 mg, Oral, Daily, Senthil Paul MD  •  vancomycin (VANCOCIN) 1,000 mg in sodium chloride 0.9 % 250 mL IVPB, 1,000 mg, Intravenous, Q12H, Senthil Paul MD, 1,000 mg at 06/04/22 0514    Objective     Vital Signs   Temp:  [98.2 °F (36.8 °C)-98.5 °F (36.9 °C)] 98.2 °F (36.8 °C)  Heart Rate:  [69-74] 71  Resp:  [16-18] 18  BP: (118-152)/() 144/51    Physical Exam:  General appearance - alert, well appearing, and in no distress  Mental status - alert, oriented to person, place, and time  Neck - supple, no significant adenopathy  Chest - clear to auscultation, no wheezes, rales or rhonchi, symmetric air entry  Heart - normal rate, regular rhythm, normal S1, S2, no murmurs, rubs, clicks or gallops  Abdomen - soft, nontender, nondistended, no masses or organomegaly  Neurological - alert, oriented, normal speech, no focal findings or movement disorder noted  Musculoskeletal - no joint tenderness, deformity or swelling  Extremities - right lateral hip, erythema, thin serosanguinous discharge    Results Review:    Lab Results (last 24 hours)     Procedure Component Value Units Date/Time    Wound Culture - Wound, Hip [[id number]] Collected: 06/04/22 0328    Specimen: Wound from  Hip Updated: 06/04/22 0353    C-reactive Protein [[id number]]  (Abnormal) Collected: 06/03/22 2229    Specimen: Blood Updated: 06/04/22 0315     C-Reactive Protein 15.01 mg/dL     Magnesium [[id number]]  (Normal) Collected: 06/03/22 2229    Specimen: Blood Updated: 06/04/22 0315     Magnesium 1.7 mg/dL     TSH [[id number]]  (Normal) Collected: 06/03/22 2229    Specimen: Blood Updated: 06/04/22 0315     TSH 3.300 uIU/mL     T4, Free [[id number]]  (Abnormal) Collected: 06/03/22 2229    Specimen: Blood Updated: 06/04/22 0315     Free T4 0.58 ng/dL     Narrative:      Results may be falsely increased if patient taking Biotin.      Sedimentation Rate [[id number]]  (Abnormal) Collected: 06/03/22 2229    Specimen: Blood Updated: 06/04/22 0255     Sed Rate 60 mm/hr     COVID PRE-OP / PRE-PROCEDURE SCREENING ORDER (NO ISOLATION) - Swab, Nasal Cavity [[id number]]  (Normal) Collected: 06/04/22 0126    Specimen: Swab from Nasal Cavity Updated: 06/04/22 0208    Narrative:      The following orders were created for panel order COVID PRE-OP / PRE-PROCEDURE SCREENING ORDER (NO ISOLATION) - Swab, Nasal Cavity.  Procedure                               Abnormality         Status                     ---------                               -----------         ------                     COVID-19,[facility] IN-[facility]...[[id number]]  Normal              Final result                 Please view results for these tests on the individual orders.    COVID-19,[facility] IN-HOUSE,Nasal Swab No Transport Media 3-4 HR TAT - Swab, Nasal Cavity [[id number]]  (Normal) Collected: 06/04/22 0126    Specimen: Swab from Nasal Cavity Updated: 06/04/22 0208     COVID19 Not Detected    Narrative:      Fact sheet for providers: https://www.fda.gov/media/578934/download     Fact sheet for patients: https://www.fda.gov/media/179312/download    Test performed by PCR.    Consider negative results in combination with clinical observations, patient history, and epidemiological  information.    Urinalysis With Culture If Indicated - Urine, Clean Catch [[id number]]  (Abnormal) Collected: 06/04/22 0130    Specimen: Urine, Clean Catch Updated: 06/04/22 0158     Color, UA Yellow     Appearance, UA Cloudy     pH, UA 8.0     Specific Gravity, UA 1.017     Glucose, UA Negative     Ketones, UA Negative     Bilirubin, UA Negative     Blood, UA Large (3+)     Protein, UA 30 mg/dL (1+)     Leuk Esterase, UA Large (3+)     Nitrite, UA Negative     Urobilinogen, UA 0.2 E.U./dL    Narrative:      In absence of clinical symptoms, the presence of pyuria, bacteria, and/or nitrites on the urinalysis result does not correlate with infection.    Urinalysis, Microscopic Only - Urine, Clean Catch [[id number]]  (Abnormal) Collected: 06/04/22 0130    Specimen: Urine, Clean Catch Updated: 06/04/22 0158     RBC, UA Too Numerous to Count /HPF      WBC, UA 31-50 /HPF      Bacteria, UA 4+ /HPF      Squamous Epithelial Cells, UA None Seen /HPF      Hyaline Casts, UA 3-6 /LPF      Methodology Manual Light Microscopy    Urine Culture - Urine, Urine, Clean Catch [[id number]] Collected: 06/04/22 0130    Specimen: Urine, Clean Catch Updated: 06/04/22 0158    Comprehensive Metabolic Panel [[id number]]  (Abnormal) Collected: 06/03/22 2229    Specimen: Blood Updated: 06/03/22 2256     Glucose 113 mg/dL      BUN 18 mg/dL      Creatinine 0.84 mg/dL      Sodium 138 mmol/L      Potassium 3.7 mmol/L      Chloride 101 mmol/L      CO2 28.0 mmol/L      Calcium 8.4 mg/dL      Total Protein 7.7 g/dL      Albumin 2.70 g/dL      ALT (SGPT) 13 U/L      AST (SGOT) 18 U/L      Alkaline Phosphatase 129 U/L      Total Bilirubin 0.2 mg/dL      Globulin 5.0 gm/dL      A/G Ratio 0.5 g/dL      BUN/Creatinine Ratio 21.4     Anion Gap 9.0 mmol/L      eGFR 75.8 mL/min/1.73      Comment: National Kidney Foundation and American Society of Nephrology (ASN) Task Force recommended calculation based on the Chronic Kidney Disease Epidemiology Collaboration  (CKD-EPI) equation refit without adjustment for race.       Narrative:      GFR Normal >60  Chronic Kidney Disease <60  Kidney Failure <15      Lactic Acid, Plasma [[id number]]  (Normal) Collected: 06/03/22 2229    Specimen: Blood Updated: 06/03/22 2252     Lactate 0.7 mmol/L     CBC & Differential [[id number]]  (Abnormal) Collected: 06/03/22 2229    Specimen: Blood Updated: 06/03/22 2239    Narrative:      The following orders were created for panel order CBC & Differential.  Procedure                               Abnormality         Status                     ---------                               -----------         ------                     CBC Auto Differential[[id number]]        Abnormal            Final result                 Please view results for these tests on the individual orders.    CBC Auto Differential [[id number]]  (Abnormal) Collected: 06/03/22 2229    Specimen: Blood Updated: 06/03/22 2239     WBC 7.76 10*3/mm3      RBC 3.11 10*6/mm3      Hemoglobin 8.4 g/dL      Hematocrit 27.1 %      MCV 87.1 fL      MCH 27.0 pg      MCHC 31.0 g/dL      RDW 15.9 %      RDW-SD 50.7 fl      MPV 9.0 fL      Platelets 241 10*3/mm3      Neutrophil % 70.0 %      Lymphocyte % 18.4 %      Monocyte % 8.4 %      Eosinophil % 2.3 %      Basophil % 0.4 %      Immature Grans % 0.5 %      Neutrophils, Absolute 5.43 10*3/mm3      Lymphocytes, Absolute 1.43 10*3/mm3      Monocytes, Absolute 0.65 10*3/mm3      Eosinophils, Absolute 0.18 10*3/mm3      Basophils, Absolute 0.03 10*3/mm3      Immature Grans, Absolute 0.04 10*3/mm3      nRBC 0.0 /100 WBC     Blood Culture - Blood, Arm, Right [[id number]] Collected: 06/03/22 2229    Specimen: Blood from Arm, Right Updated: 06/03/22 2236    Blood Culture - Blood, Arm, Right [[id number]] Collected: 06/03/22 2229    Specimen: Blood from Arm, Right Updated: 06/03/22 2236        Imaging Results (Last 24 Hours)     Procedure Component Value Units Date/Time    XR Chest 1 View [[id number]]  Resulted: 06/04/22 0420     Updated: 06/04/22 0420    CT Lower Extremity Right Without Contrast [[id number]] Resulted: 06/03/22 2220     Updated: 06/03/22 2224                Assessment & Plan       Abscess right hip at site of chronically infected and nonhealing wound, chronic anticoagulation and antiplatelet therapy    She has been admitted to the hospitalist service and broad-spectrum antibiotics have been started.  ID has been consulted due to her history of recurrent bacteremia and wound infections.  Dr. Turner is not available this weekend for which general surgery has been consulted for evaluation of this patient.  She will undergo incision and drainage of the right hip abscess.  Cultures will be taken for assistance with treatment.  The risks, benefits, complications, and possible alternatives were discussed with the patient who agreed to proceed.      Magda Salcido MD  06/04/22  05:49 CDT

## 2022-06-04 NOTE — ED PROVIDER NOTES
Subjective   Patient is sent to the emergency room from the nursing home with complaint of pain in her right hip.  She had a fractured hip and surgery for that sometime ago and then that area became infected.  She had to have some revisions and some flaps done.  Her last surgery was in September 2021.  She has been in the nursing home because of a fractures to her back.  In the last 3 to 4 days she has had increasing aching pain over the surgical site.  The staff the nursing home today said it looked red and swollen and they thought there is even some blood draining out of the wound so they sent her here for evaluation.      History provided by:  Patient   used: No    Hip Pain  Location:  Right hip  Quality:  Aching  Severity:  Moderate  Onset quality:  Gradual  Duration:  4 days  Timing:  Constant  Progression:  Worsening  Chronicity:  New  Associated symptoms: no abdominal pain, no congestion, no cough, no diarrhea, no fatigue, no fever, no headaches, no loss of consciousness, no myalgias, no rash, no shortness of breath and no vomiting        Review of Systems   Constitutional: Negative.  Negative for fatigue and fever.   HENT: Negative.  Negative for congestion.    Respiratory: Negative.  Negative for cough and shortness of breath.    Cardiovascular: Negative.    Gastrointestinal: Negative.  Negative for abdominal pain, diarrhea and vomiting.   Genitourinary: Negative.    Musculoskeletal: Negative.  Negative for myalgias.   Skin: Negative.  Negative for rash.   Neurological: Negative.  Negative for loss of consciousness and headaches.   Psychiatric/Behavioral: Negative.    All other systems reviewed and are negative.      Past Medical History:   Diagnosis Date   • Age-related osteoporosis with current pathological fracture 5/27/2020   • Arthritis    • Asthma    • Bilateral bunions 12/23/2020   • Cancer (HCC)    • Cardiac pacemaker syndrome 12/23/2020    Overview:  - heart block - implanted  "11/16   • Charcot's joint of foot, left 12/23/2020   • Chronic deep vein thrombosis (DVT) of right lower extremity ([facility]) 6/23/2021   • Chronic pain syndrome 6/22/2021   • Chronic sinusitis    • COPD (chronic obstructive pulmonary disease) ([facility])    • Coronary artery disease    • 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   • Elevated cholesterol    • Eustachian tube dysfunction    • Heart disease    • Herpes simplex    • History of transfusion    • Hyperlipidemia    • Hypertension    • Hypothyroidism 12/23/2020   • Intrinsic asthma 12/23/2020   • Knee dislocation    • Labral tear of right hip joint    • Laryngitis sicca    • Laryngitis, chronic    • Left carotid bruit 3/9/2016   • MI (myocardial infarction) ([facility])    • Myalgia due to statin 6/25/2019   • Open wound of right hip 9/14/2021   • Osteomyelitis of right femur ([facility]) 7/6/2021   • Otorrhea    • Pacemaker 11/17/2016   • Primary osteoarthritis of left knee 12/23/2020   • Psoriasis vulgaris 12/23/2020   • S/P coronary artery stent placement 3/9/2016   • Sensorineural hearing loss    • Seropositive rheumatoid arthritis of multiple sites ([facility]) 12/27/2019    Overview:  -myochrysine '93-'96 -methotrexate '96--->11/98;r/s  restarted 2/99--> 8/14 (anemia) -sulfasalazine- not effective -penicillamine 6/98-->10/98; no effect -leflunomide 11/98--> - Humira '13-->didn't take - Enbrel 12/14-->3/15- no effect!   Last Assessment & Plan:  - \"aching all over\" because she had to be off her anti-rheumatic drugs for 2 weeks in preparation for her R knee surgery - he   • Sick sinus syndrome ([facility]) 12/27/2019   • Sjogren's disease ([facility])    • Spondylolisthesis of lumbar region 1/17/2018   • Syncope, recurrent 2/8/2021       Allergies   Allergen Reactions   • Atorvastatin Other (See Comments)     LEG CRAMPS     • Amoxicillin Rash   • Escitalopram Rash   • Nabumetone Rash   • Niacin Er Rash   • Penicillin G Rash   • Penicillins Rash   • Simvastatin Rash       Past Surgical History: "   Procedure Laterality Date   • A-V CARDIAC PACEMAKER INSERTION  2016   • ATRIAL CARDIAC PACEMAKER INSERTION     • CARDIAC CATHETERIZATION     • CATARACT EXTRACTION     • CERVICAL CORPECTOMY N/A 3/3/2021    Procedure: CERVICAL 6 CORPECTOMY WITH TITANIUM CAGE WITH NEURO MONITORING;  Surgeon: Bandar Shea MD;  Location:  [facility];  Service: Neurosurgery;  Laterality: N/A;   • COLONOSCOPY  11/08/2011    One fold in the ascending colon which showed ulcer otherwise normal exam   • COLONOSCOPY  11/12/2004    Normal exam repeat in five years   • CORONARY ANGIOPLASTY WITH STENT PLACEMENT      X 2; 2013 & 2014   • ENDOSCOPY  07/10/2014    Normal exam   • FLAP LEG Right 9/14/2021    Procedure: RIGHT GLUTEAL FASCIOCUTANEOUS ADVANCEMENT FLAP AND RIGHT TENSOR FASCIAL JESSICA FLAP;  Surgeon: Amadeo Turner MD;  Location:  [facility];  Service: Plastics;  Laterality: Right;   • HIP ABDUCTION TENOTOMY BILATERAL Right 1/14/2021    Procedure: RIGHT HIP GLUTEUS MEDLUS / MINIMUS REPAIR, POSSIBLE ACHILLES ALLOGRAFT;  Surgeon: Nino Carlson MD;  Location:  [facility];  Service: Orthopedics;  Laterality: Right;   • INCISION AND DRAINAGE HIP Right 2/9/2021    Procedure: HIP INCISION AND DRAINAGE;  Surgeon: Nino Carlson MD;  Location:  [facility];  Service: Orthopedics;  Laterality: Right;   • INCISION AND DRAINAGE LEG Right 10/24/2021    Procedure: INCISION AND DRAINAGE LOWER EXTREMITY;  Surgeon: Amadeo Turner MD;  Location:  [facility];  Service: Plastics;  Laterality: Right;   • INCISION AND DRAINAGE OF WOUND Right 7/8/2021    Procedure: INCISION AND DRAINAGE WOUND RIGHT HIP;  Surgeon: James Huntley MD;  Location:  [facility];  Service: Orthopedics;  Laterality: Right;   • JOINT REPLACEMENT     • KYPHOPLASTY WITH BIOPSY Bilateral 10/26/2021    Procedure: THOARCIC 12 KYPHOPLASTY WITH BIOPSY;  Surgeon: Bandar Shea MD;  Location:  [facility];  Service: Neurosurgery;  Laterality: Bilateral;   • LEG DEBRIDEMENT Right  9/14/2021    Procedure: DEBRIDEMENT OF RIGHT HIP WOUND, RIGHT GLUTEAL FASCIOCUTANEOUS ADVANCEMENT FLAP AND RIGHT TENSOR FASCIAL JESSICA FLAP;  Surgeon: Amadeo Turner MD;  Location:  [facility];  Service: Plastics;  Laterality: Right;   • LUMBAR DISCECTOMY Right 3/23/2021    Procedure: LUMBAR DISCECTOMY MICRO, Lumbar 1/2 right;  Surgeon: Bandar Shea MD;  Location:  [facility];  Service: Neurosurgery;  Laterality: Right;   • LUMBAR FUSION N/A 1/19/2018    Procedure: L3-4,L4-5 DECOMPRESSION, POSTERIOR SPINAL FUSION WITH INSTRUMENTATION;  Surgeon: Fortino Oropeza MD;  Location:  [facility];  Service:    • LUMBAR LAMINECTOMY WITH FUSION Left 1/17/2018    Procedure: LEFT L3-4 L4-5 LATERAL LUMBAR INTERBODY FUSION;  Surgeon: Fortino Oropeza MD;  Location:  [facility];  Service:    • MYRINGOTOMY W/ TUBES  09/04/2014    TUBES NO LONGER IN PLACE   • OTHER SURGICAL HISTORY      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 so hardware was removed and spacers were placed   • REPLACEMENT TOTAL KNEE Right        Family History   Problem Relation Age of Onset   • Cancer Mother    • Heart disease Father        Social History     Socioeconomic History   • Marital status:    Tobacco Use   • Smoking status: Never Smoker   • Smokeless tobacco: Never Used   Vaping Use   • Vaping Use: Never used   Substance and Sexual Activity   • Alcohol use: No   • Drug use: Never   • Sexual activity: Defer           Objective   Physical Exam  Vitals and nursing note reviewed.   Constitutional:       Appearance: Normal appearance.   HENT:      Head: Normocephalic and atraumatic.   Eyes:      Extraocular Movements: Extraocular movements intact.      Pupils: Pupils are equal, round, and reactive to light.   Cardiovascular:      Rate and Rhythm: Normal rate and regular rhythm.   Pulmonary:      Effort: Pulmonary effort is normal.      Breath sounds: Normal breath sounds.   Abdominal:      General: Abdomen is flat.      Palpations: Abdomen is soft.       Tenderness: There is no abdominal tenderness.   Musculoskeletal:         General: Swelling and tenderness present.      Cervical back: Normal range of motion and neck supple.      Comments: Patient is tender with heat and erythema directly over the surgical site in the right hip.  There is some fluctuance also.  There is no other obvious deformity noted.   Skin:     General: Skin is warm and dry.   Neurological:      General: No focal deficit present.      Mental Status: She is alert and oriented to person, place, and time.   Psychiatric:         Mood and Affect: Mood normal.         Behavior: Behavior normal.         Procedures           ED Course  ED Course as of 06/04/22 0328   Sat Jun 04, 2022   0327 CT scan did reveal an area of abscess in the right surgical site.  She also may have a urinary tract infection.  I spoke with Dr. Salcido who advised her to be admitted to medicine.  Consult with Dr. Paul and he agreed admit.  She may need to have this area opened up.  She is admitted in stable condition. [TR]      ED Course User Index  [TR] Alvaro Kc Jr., MD                                                 MDM  Number of Diagnoses or Management Options  Abscess of right hip: new and requires workup     Amount and/or Complexity of Data Reviewed  Clinical lab tests: ordered and reviewed  Tests in the radiology section of CPT®: ordered and reviewed  Tests in the medicine section of CPT®: ordered and reviewed  Decide to obtain previous medical records or to obtain history from someone other than the patient: yes  Discuss the patient with other providers: yes    Risk of Complications, Morbidity, and/or Mortality  Presenting problems: moderate  Diagnostic procedures: moderate  Management options: moderate    Patient Progress  Patient progress: stable      Final diagnoses:   Abscess of right hip       ED Disposition  ED Disposition     ED Disposition   Decision to Admit    Condition   --    Comment   Level of  Care: Med/Surg [1]   Diagnosis: Abscess of right hip [[id number]]   Admitting Physician: RODERICK WHITMAN [[id number]]   Attending Physician: RODERICK WHITMAN [[id number]]   Isolate for COVID?: No [0]   Certification: I Certify That Inpatient Hospital Services Are Medically Necessary For Greater Than 2 Midnights               No follow-up provider specified.       Medication List      No changes were made to your prescriptions during this visit.          Alvaro Kc Jr., MD  06/04/22 0328

## 2022-06-04 NOTE — H&P
[facility] Medicine Services  HISTORY AND PHYSICAL    Date of Admission: 6/3/2022  Primary Care Physician: Leta Julian DO    Subjective     Chief Complaint: Right hip surgical site drainage, swelling and pain    History of Present Illness  Tawny Shin is a 66-year-old female with a vast medical history hypertension, hyperlipidemia, Sojourn's disease, coronary artery disease with MI, iliopsoas abscess, discitis, osteomyelitis of lumbar spine, heart failure, Staph aureus bacteremia, chronic pain syndrome.,  DVT right lower extremity on Eliquis.   Patient has had multiple admissions to [facility], most recent 4/20 2-4/2026, 2022 with left pleural effusion and acute on chronic heart failure with preserved ejection fraction.  She underwent left thoracentesis on 4/23 cultures and cytology negative.  She has nonhealing wounds, bilateral heels, chronic lower extremity edema with compression wraps and use.  She states the nursing home is trying to heal the wounds.  She has chronic indwelling catheter and with positive culture for E. coli she was treated 3 days with cefdinir prior to her discharge.    Patient returns today with complaints of right hip incision site drainage, swelling and pain.  Currently she is scoring her pain 4 on a scale of 1-10.  She is alert and oriented.  She has no shortness of breathing or chest pain.  Patient is bedbound.  ER work-up revealed large fluid collection lateral to the right hip at the level of the greater trochanter subcutaneous tissues measuring approximately 7.8 x 4.0 x 8.1 cm.  Adjacent hazy stranding.  Findings may represent abscess with surrounding cellulitis.  Comparison made 10/22/2021 osteopenia, no osseous abnormality.  Patient did undergo I&D of the right hip on 10/24/2021 per Dr. Yue MD, after being admitted with bacteremia.  She is also been followed by infectious diseases, Dr. Matt De Leon.  Labs are without acute  finding, patient does have chronic normocytic anemia.    With finding of abscess Case was discussed with Juan Miguel Turner and Magda Salcido.  With complicated comorbidities hospitalist will admit and both will be consulted to determine best course of action.  She will remain n.p.o. after midnight tonight.  Patient is admitted for further evaluation and treatment.    Review of Systems   A 10 point review of systems was completed, all negative except for those discussed in HPI    Past Medical History:   Past Medical History:   Diagnosis Date   • Age-related osteoporosis with current pathological fracture 5/27/2020   • Arthritis    • Asthma    • Bilateral bunions 12/23/2020   • Cancer (Formerly [facility])    • Cardiac pacemaker syndrome 12/23/2020    Overview:  - heart block - implanted 11/16   • Charcot's joint of foot, left 12/23/2020   • Chronic deep vein thrombosis (DVT) of right lower extremity (Formerly [facility]) 6/23/2021   • Chronic pain syndrome 6/22/2021   • Chronic sinusitis    • COPD (chronic obstructive pulmonary disease) (Formerly [facility])    • Coronary artery disease    • 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   • Elevated cholesterol    • Eustachian tube dysfunction    • Heart disease    • Herpes simplex    • History of transfusion    • Hyperlipidemia    • Hypertension    • Hypothyroidism 12/23/2020   • Intrinsic asthma 12/23/2020   • Knee dislocation    • Labral tear of right hip joint    • Laryngitis sicca    • Laryngitis, chronic    • Left carotid bruit 3/9/2016   • MI (myocardial infarction) (Formerly [facility])    • Myalgia due to statin 6/25/2019   • Open wound of right hip 9/14/2021   • Osteomyelitis of right femur (Formerly [facility]) 7/6/2021   • Otorrhea    • Pacemaker 11/17/2016   • Primary osteoarthritis of left knee 12/23/2020   • Psoriasis vulgaris 12/23/2020   • S/P coronary artery stent placement 3/9/2016   • Sensorineural hearing loss    • Seropositive rheumatoid arthritis of multiple sites (Formerly [facility]) 12/27/2019    Overview:  -myochrysine '93-'96 -methotrexate  "'96--->11/98;r/s  restarted 2/99--> 8/14 (anemia) -sulfasalazine- not effective -penicillamine 6/98-->10/98; no effect -leflunomide 11/98--> - Humira '13-->didn't take - Enbrel 12/14-->3/15- no effect!   Last Assessment & Plan:  - \"aching all over\" because she had to be off her anti-rheumatic drugs for 2 weeks in preparation for her R knee surgery - he   • Sick sinus syndrome (HCC) 12/27/2019   • Sjogren's disease (HCC)    • Spondylolisthesis of lumbar region 1/17/2018   • Syncope, recurrent 2/8/2021       Past Surgical History:   Past Surgical History:   Procedure Laterality Date   • A-V CARDIAC PACEMAKER INSERTION  2016   • ATRIAL CARDIAC PACEMAKER INSERTION     • CARDIAC CATHETERIZATION     • CATARACT EXTRACTION     • CERVICAL CORPECTOMY N/A 3/3/2021    Procedure: CERVICAL 6 CORPECTOMY WITH TITANIUM CAGE WITH NEURO MONITORING;  Surgeon: Bandar Shea MD;  Location: [facility] OR;  Service: Neurosurgery;  Laterality: N/A;   • COLONOSCOPY  11/08/2011    One fold in the ascending colon which showed ulcer otherwise normal exam   • COLONOSCOPY  11/12/2004    Normal exam repeat in five years   • CORONARY ANGIOPLASTY WITH STENT PLACEMENT      X 2; 2013 & 2014   • ENDOSCOPY  07/10/2014    Normal exam   • FLAP LEG Right 9/14/2021    Procedure: RIGHT GLUTEAL FASCIOCUTANEOUS ADVANCEMENT FLAP AND RIGHT TENSOR FASCIAL JESSICA FLAP;  Surgeon: Amadeo Turner MD;  Location: [facility] OR;  Service: Plastics;  Laterality: Right;   • HIP ABDUCTION TENOTOMY BILATERAL Right 1/14/2021    Procedure: RIGHT HIP GLUTEUS MEDLUS / MINIMUS REPAIR, POSSIBLE ACHILLES ALLOGRAFT;  Surgeon: Nino Carlson MD;  Location:  [facility] OR;  Service: Orthopedics;  Laterality: Right;   • INCISION AND DRAINAGE HIP Right 2/9/2021    Procedure: HIP INCISION AND DRAINAGE;  Surgeon: Nino Carlson MD;  Location:  [facility] OR;  Service: Orthopedics;  Laterality: Right;   • INCISION AND DRAINAGE LEG Right 10/24/2021    Procedure: INCISION AND DRAINAGE LOWER " EXTREMITY;  Surgeon: Amadeo Turner MD;  Location:  [facility] OR;  Service: Plastics;  Laterality: Right;   • INCISION AND DRAINAGE OF WOUND Right 7/8/2021    Procedure: INCISION AND DRAINAGE WOUND RIGHT HIP;  Surgeon: James Huntley MD;  Location:  [facility] OR;  Service: Orthopedics;  Laterality: Right;   • JOINT REPLACEMENT     • KYPHOPLASTY WITH BIOPSY Bilateral 10/26/2021    Procedure: THOARCIC 12 KYPHOPLASTY WITH BIOPSY;  Surgeon: Bandar Shea MD;  Location:  [facility] OR;  Service: Neurosurgery;  Laterality: Bilateral;   • LEG DEBRIDEMENT Right 9/14/2021    Procedure: DEBRIDEMENT OF RIGHT HIP WOUND, RIGHT GLUTEAL FASCIOCUTANEOUS ADVANCEMENT FLAP AND RIGHT TENSOR FASCIAL JESSICA FLAP;  Surgeon: Amadeo Turner MD;  Location:  [facility] OR;  Service: Plastics;  Laterality: Right;   • LUMBAR DISCECTOMY Right 3/23/2021    Procedure: LUMBAR DISCECTOMY MICRO, Lumbar 1/2 right;  Surgeon: Bandar Shea MD;  Location:  [facility] OR;  Service: Neurosurgery;  Laterality: Right;   • LUMBAR FUSION N/A 1/19/2018    Procedure: L3-4,L4-5 DECOMPRESSION, POSTERIOR SPINAL FUSION WITH INSTRUMENTATION;  Surgeon: Fortino Oropeza MD;  Location:  [facility] OR;  Service:    • LUMBAR LAMINECTOMY WITH FUSION Left 1/17/2018    Procedure: LEFT L3-4 L4-5 LATERAL LUMBAR INTERBODY FUSION;  Surgeon: Fortino Oropeza MD;  Location: [facility] OR;  Service:    • MYRINGOTOMY W/ TUBES  09/04/2014    TUBES NO LONGER IN PLACE   • OTHER SURGICAL HISTORY      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 so hardware was removed and spacers were placed   • REPLACEMENT TOTAL KNEE Right        Family History: family history includes Cancer in her mother; Heart disease in her father.    Social History:  reports that she has never smoked. She has never used smokeless tobacco. She reports that she does not drink alcohol and does not use drugs.    Code Status: DNR, if unable speak for herself her son Gee or sister Skye Stone will speak for  her      Allergies:  Allergies   Allergen Reactions   • Atorvastatin Other (See Comments)     LEG CRAMPS     • Amoxicillin Rash   • Escitalopram Rash   • Nabumetone Rash   • Niacin Er Rash   • Penicillin G Rash   • Penicillins Rash   • Simvastatin Rash       Medications:  No current facility-administered medications on file prior to encounter.     Current Outpatient Medications on File Prior to Encounter   Medication Sig Dispense Refill   • Alirocumab 75 MG/ML solution auto-injector Inject 1 mL under the skin into the appropriate area as directed Every 14 (Fourteen) Days. 2 pen 11   • apixaban (ELIQUIS) 5 MG tablet tablet Take 1 tablet by mouth 2 (Two) Times a Day. 60 tablet    • ascorbic acid (VITAMIN C) 500 MG tablet Take 500 mg by mouth Daily.     • aspirin 81 MG EC tablet Take 81 mg by mouth Daily.     • carvedilol (COREG) 25 MG tablet Take 1 tablet by mouth 2 (Two) Times a Day With Meals. 180 tablet 3   • Certolizumab Pegol (CIMZIA PREFILLED SC) Inject  under the skin into the appropriate area as directed Every 30 (Thirty) Days.     • collagenase 250 UNIT/GM ointment Apply 1 application topically to the appropriate area as directed Daily.     • Evolocumab (REPATHA) solution prefilled syringe injection Inject 140 mg under the skin into the appropriate area as directed Every 14 (Fourteen) Days.     • ACETAMINOPHEN PO Take 650 mg by mouth Every 6 (Six) Hours As Needed (mild pain).     • Diclofenac Sodium (VOLTAREN) 1 % gel gel Apply 4 g topically to the appropriate area as directed 4 (Four) Times a Day As Needed.     • docusate sodium 100 MG capsule Take 1 capsule by mouth 2 (Two) Times a Day.     • DULoxetine (CYMBALTA) 60 MG capsule Take 1 capsule by mouth Daily.     • famotidine (PEPCID) 40 MG tablet Take 40 mg by mouth Daily.     • ferrous sulfate 324 (65 Fe) MG tablet delayed-release EC tablet Take 324 mg by mouth Daily With Breakfast.     • fluticasone (FLONASE) 50 MCG/ACT nasal spray 1 spray into the  nostril(s) as directed by provider Daily. 18.2 mL 5   • furosemide (LASIX) 40 MG tablet Take 40 mg by mouth Daily.     • HYDROcodone-acetaminophen (NORCO) 7.5-325 MG per tablet Take 1 tablet by mouth Every 8 (Eight) Hours As Needed for Moderate Pain . 9 tablet 0   • Hydrocortisone (sam's amazing butt) cream Apply 1 application topically to the appropriate area as directed As Needed. Recipe Contains: 1:1:1:1 of hydrocortisone 1% oint, bacitracin 500 unit/gram oint, zinc 20% oint, nystatin 100,000 unit/gram oint     • isosorbide mononitrate (IMDUR) 60 MG 24 hr tablet Take 1 tablet by mouth Daily.     • levothyroxine (SYNTHROID, LEVOTHROID) 75 MCG tablet Take 1 tablet by mouth Daily. 90 tablet 3   • magnesium hydroxide (MILK OF MAGNESIA) 400 MG/5ML suspension Take 30 mL by mouth Daily As Needed for Constipation.     • magnesium oxide (MAG-OX) 400 MG tablet Take 400 mg by mouth Daily.     • Menthol-Zinc Oxide 0.45-20 % ointment Apply 1 application topically. Apply to buttock     • methylPREDNISolone (MEDROL) 4 MG dose pack Take as directed on package instructions. 21 tablet 0   • multivitamin with minerals tablet tablet Take 1 tablet by mouth Daily.     • Naloxegol Oxalate (Movantik) 25 MG tablet Take 25 mg by mouth Every Morning.     • nitroglycerin (Nitrostat) 0.4 MG SL tablet Place 1 tablet under the tongue Every 5 (Five) Minutes As Needed for Chest Pain. Take no more than 3 doses in 15 minutes.  12   • ondansetron (ZOFRAN) 4 MG tablet Take 4 mg by mouth Every 6 (Six) Hours As Needed for Nausea or Vomiting.     • polyethylene glycol (GlycoLax) 17 GM/SCOOP powder Take 17 g by mouth Daily.     • saccharomyces boulardii (Florastor) 250 MG capsule Take 1 capsule by mouth Daily. 90 capsule 3   • salsalate (DISALCID) 500 MG tablet Take 500 mg by mouth. Take 5 tablets Monday Wednesday and Friday and give 4 tablets daily Tuesday, Thursday, Saturday, and Sunday for mild pain     • sennosides-docusate (PERICOLACE) 8.6-50 MG  "per tablet Take 2 tablets by mouth 2 (Two) Times a Day.     • sucralfate (CARAFATE) 1 g tablet Take 1 g by mouth 4 (Four) Times a Day Before Meals & at Bedtime.     • Thiamine HCl (VITAMIN B-1 PO) Take 100 mg by mouth Daily.     • traMADol (ULTRAM) 50 MG tablet Take 100 mg by mouth 3 (Three) Times a Day As Needed for Moderate Pain .     • valACYclovir (VALTREX) 500 MG tablet Take 500 mg by mouth Daily.       I have utilized all available immediate resources to obtain, update, and review the patient's current medications.    Objective     /67   Pulse 74   Temp 98.5 °F (36.9 °C) (Oral)   Resp 16   Ht 167.6 cm (66\")   Wt 82.2 kg (181 lb 3.5 oz)   LMP  (LMP Unknown)   SpO2 96%   BMI 29.25 kg/m²   Physical Exam  Vitals reviewed.   Constitutional:       Appearance: She is obese. She is ill-appearing.   HENT:      Head: Normocephalic and atraumatic.      Mouth/Throat:      Mouth: Mucous membranes are moist.      Pharynx: Oropharynx is clear.   Eyes:      Extraocular Movements: Extraocular movements intact.      Conjunctiva/sclera: Conjunctivae normal.   Cardiovascular:      Rate and Rhythm: Normal rate and regular rhythm.   Pulmonary:      Effort: Pulmonary effort is normal.      Breath sounds: Normal breath sounds.   Abdominal:      Palpations: Abdomen is soft.      Comments: Protuberant   Musculoskeletal:      Cervical back: Normal range of motion and neck supple.      Comments: Bedbound, foot drop bilateral, compression wraps lateral   Skin:     General: Skin is warm and dry.   Neurological:      General: No focal deficit present.      Mental Status: She is alert and oriented to person, place, and time.   Psychiatric:         Mood and Affect: Mood normal.         Behavior: Behavior normal.       Pertinent Data:   Lab Results (last 72 hours)     Procedure Component Value Units Date/Time    Comprehensive Metabolic Panel [[id number]]  (Abnormal) Collected: 06/03/22 2229    Specimen: Blood Updated: 06/03/22 " 2256     Glucose 113 mg/dL      BUN 18 mg/dL      Creatinine 0.84 mg/dL      Sodium 138 mmol/L      Potassium 3.7 mmol/L      Chloride 101 mmol/L      CO2 28.0 mmol/L      Calcium 8.4 mg/dL      Total Protein 7.7 g/dL      Albumin 2.70 g/dL      ALT (SGPT) 13 U/L      AST (SGOT) 18 U/L      Alkaline Phosphatase 129 U/L      Total Bilirubin 0.2 mg/dL      Globulin 5.0 gm/dL      A/G Ratio 0.5 g/dL      BUN/Creatinine Ratio 21.4     Anion Gap 9.0 mmol/L      eGFR 75.8 mL/min/1.73     Lactic Acid, Plasma [[id number]]  (Normal) Collected: 06/03/22 2229    Specimen: Blood Updated: 06/03/22 2252     Lactate 0.7 mmol/L     CBC Auto Differential [[id number]]  (Abnormal) Collected: 06/03/22 2229    Specimen: Blood Updated: 06/03/22 2239     WBC 7.76 10*3/mm3      RBC 3.11 10*6/mm3      Hemoglobin 8.4 g/dL      Hematocrit 27.1 %      MCV 87.1 fL      MCH 27.0 pg      MCHC 31.0 g/dL      RDW 15.9 %      RDW-SD 50.7 fl      MPV 9.0 fL      Platelets 241 10*3/mm3      Neutrophil % 70.0 %      Lymphocyte % 18.4 %      Monocyte % 8.4 %      Eosinophil % 2.3 %      Basophil % 0.4 %      Immature Grans % 0.5 %      Neutrophils, Absolute 5.43 10*3/mm3      Lymphocytes, Absolute 1.43 10*3/mm3      Monocytes, Absolute 0.65 10*3/mm3      Eosinophils, Absolute 0.18 10*3/mm3      Basophils, Absolute 0.03 10*3/mm3      Immature Grans, Absolute 0.04 10*3/mm3      nRBC 0.0 /100 WBC     Blood Culture - Blood, Arm, Right [[id number]] Collected: 06/03/22 2229    Specimen: Blood from Arm, Right Updated: 06/03/22 2236    Blood Culture - Blood, Arm, Right [[id number]] Collected: 06/03/22 2229    Specimen: Blood from Arm, Right Updated: 06/03/22 2236        Imaging Results (Last 24 Hours)     Procedure Component Value Units Date/Time    CT Lower Extremity Right Without Contrast [[id number]] Resulted: 06/03/22 2220     Updated: 06/03/22 2224      large fluid collection lateral to the right hip at the level of the greater trochanter subcutaneous tissues  measuring approximately 7.8 x 4.0 x 8.1 cm.  Adjacent hazy stranding.  Findings may represent abscess with surrounding cellulitis.  Comparison made 10/22/2021 osteopenia, no osseous abnormality.       4/24/2022 ECHO  Interpretation Summary    · Left ventricular ejection fraction appears to be 66 - 70%.  · Left ventricular diastolic function was normal.  · Abnormal global longitudinal LV strain (GLS) = -10.0%  · Left atrial volume is mildly increased.  · Estimated right ventricular systolic pressure from tricuspid regurgitation is normal (<35 mmHg).        Assessment / Plan     Assessment:   Active Hospital Problems    Diagnosis    • **Abscess, right hip    • Pressure injury of skin of heel    • Chronic pain    • Chronic indwelling Horan catheter    • Essential hypertension    • Rheumatoid arthritis involving multiple sites (HCC)    • Functional neurological symptom disorder with weakness or paralysis    • Chronic anticoagulation    • Chronic embolism and thrombosis of unspecified deep veins of left lower extremity (HCC)        Plan:   1.  Admit as inpatient  2.  Vanco and cefepime  3.  Consult Omar Verbist, general surgery and ID  4.  Labs in a.m.  5.  N.p.o. after midnight for possible intervention in a.m., will hold Eliquis  6.  Consult wound care-bilateral heel ulcerations  7.  Consult PT and OT,   8.  Normal saline at 50 mL/hour  9.  Home medications reviewed and restarted as appropriate  10.  Currently anticoagulated with Eliquis, due to compression wraps on bilateral lower extremities DVT T prophylaxis with SCDs are not functional  11.  Pain management  12.  Exchange chronic indwelling catheter, obtain urinalysis    I discussed the patient's findings and my recommendations with: Senthil Paul MD  Time spent 45 minutes    Patient seen and examined by me on 6/3/2022 at 12:20 PM.    Electronically signed by DANIELA Miranda, 06/04/22, 00:58 CDT.    This visit was performed by both a  physician and an APC. I personally evaluated and examined the patient. I performed all aspects of the MDM as documented.  Patient valuated time admission.  Agree with assessment and plans as outlined above.  Patient with cellulitis and significant abscess formation around her right hip.  Plan to culture wound and blood start broad-spectrum antibiotics.  Surgical consultation and infectious disease consultation will be obtained.    Electronically signed by Senthil Paul MD, 6/4/2022, 02:42 CDT.

## 2022-06-04 NOTE — PROGRESS NOTES
"    [facility]  INPATIENT PROGRESS NOTE    Patient Name: Tawny Shin  Date of Admission: 6/3/2022  Today's Date: 06/04/22  Length of Stay: 0  Primary Care Physician: Leta Julian DO    Subjective   Chief Complaint: right hip pain  HPI     Patient seen and examined after OR.  She is somewhat tired and fatigued.  Patient denies any current issues.  Patient reports that had no drainage from the area until she got here then the whole bed was soaked and there was a \"gel\" like substance coming out of it.  Reports being in her usual state of health recently.          Review of Systems   Constitutional: Positive for activity change and fatigue. Negative for appetite change, chills and fever.   Respiratory: Negative for cough and shortness of breath.    Cardiovascular: Negative for chest pain and palpitations.   Gastrointestinal: Negative for abdominal distention, abdominal pain, diarrhea, nausea and vomiting.   Skin: Positive for wound.   Neurological: Positive for weakness.        All pertinent negatives and positives are as above. All other systems have been reviewed and are negative unless otherwise stated.     Objective    Temp:  [97.7 °F (36.5 °C)-99.9 °F (37.7 °C)] 97.7 °F (36.5 °C)  Heart Rate:  [61-74] 69  Resp:  [12-18] 18  BP: (102-152)/() 107/51  Physical Exam  Vitals and nursing note reviewed.   Constitutional:       Appearance: Normal appearance.   HENT:      Head: Normocephalic and atraumatic.      Nose: Nose normal. No congestion or rhinorrhea.      Mouth/Throat:      Mouth: Mucous membranes are moist.      Pharynx: Oropharynx is clear.   Eyes:      General: No scleral icterus.     Conjunctiva/sclera: Conjunctivae normal.   Cardiovascular:      Rate and Rhythm: Normal rate and regular rhythm.   Pulmonary:      Effort: Pulmonary effort is normal. No respiratory distress.      Breath sounds: Normal breath sounds. No stridor.   Abdominal:      " General: Abdomen is flat. Bowel sounds are normal. There is no distension.      Palpations: Abdomen is soft. There is no mass.   Skin:     General: Skin is warm and dry.      Coloration: Skin is pale. Skin is not jaundiced.   Neurological:      Mental Status: She is alert and oriented to person, place, and time. Mental status is at baseline.   Psychiatric:         Mood and Affect: Mood normal.         Behavior: Behavior normal.           Results Review:  I have reviewed the labs, radiology results, and diagnostic studies.    Laboratory Data:   Results from last 7 days   Lab Units 06/04/22  1111 06/03/22  2229   WBC 10*3/mm3 5.21 7.76   HEMOGLOBIN g/dL 7.9* 8.4*   HEMATOCRIT % 26.3* 27.1*   PLATELETS 10*3/mm3 222 241        Results from last 7 days   Lab Units 06/04/22  1111 06/03/22  2229   SODIUM mmol/L 138 138   POTASSIUM mmol/L 3.7 3.7   CHLORIDE mmol/L 102 101   CO2 mmol/L 27.0 28.0   BUN mg/dL 15 18   CREATININE mg/dL 0.80 0.84   CALCIUM mg/dL 8.5* 8.4*   BILIRUBIN mg/dL 0.2 0.2   ALK PHOS U/L 114 129*   ALT (SGPT) U/L 11 13   AST (SGOT) U/L 16 18   GLUCOSE mg/dL 139* 113*       Culture Data:   No results found for: BLOODCX, URINECX, WOUNDCX, MRSACX, RESPCX, STOOLCX    Radiology Data:   Imaging Results (Last 24 Hours)     Procedure Component Value Units Date/Time    CT Lower Extremity Right Without Contrast [[id number]] Collected: 06/04/22 1005     Updated: 06/04/22 1019    Narrative:      CT LOWER EXTREMITY RIGHT WO CONTRAST- 6/3/2022 10:10 PM CDT     HISTORY: pain and erythema in proximal femur at previous surgery site      COMPARISON: 10/22/2021     DOSE LENGTH PRODUCT: 493 mGy cm. Automated exposure control was also  utilized to decrease patient radiation dose.     TECHNIQUE: Axial images of the right hip are obtained without contrast.  Sagittal coronal images reformatted     FINDINGS:  There is a 9.3 x 7.1 x 4.5 cm ill-defined collection within  the lateral soft tissues of the right hip/proximal thigh with  Hounsfield  units measuring in the 40s. There is stranding of the adjacent  subcutaneous fat with overlying lateral skin thickening. A well-defined  walled off fluid collection is not localized. The edema extends into the  anterior lateral soft tissues of the right thigh.     Injection granuloma suspected within the subcutaneous tissues of the  right buttocks. There is advanced osteopenia. A lucency of the greater  trochanter may represent prior site of hardware removal. These  osteophytes of the greater trochanter. No radiographic evidence of  osteomyelitis. No acute regional bony pathology.     Horan catheter within the bladder. Diffuse vascular calcification.  Reactive right inguinal lymph nodes. Coarse calcifications uterus  suggesting small calcified leiomyoma.       Impression:      1. There is a isointense ill-defined collection within the lateral soft  tissues of the right hip with measurements provided above. Given the  Hounsfield units measuring in the 40s, inflammatory phlegmon favored  over fluid containing abscess collection. Extensive edema/inflammation  of the subcutaneous tissues with overlying skin thickening.  2. Osteopenia with chronic changes of the right hip. No acute osseous  pathology.     Comments: A preliminary report is issued to the ER by the [facility]  radiology service. This preliminary report describes similar findings as  above but suggest surrounding cellulitis and possible abscess. Again,  given the Hounsfield unit measurement of 40s and the lack of a  well-defined wall-phlegmon and/or old hematoma favored over simple  abscess collection.  This report was finalized on 06/04/2022 10:16 by Dr. Trinity Gómez MD.    XR Chest 1 View [[id number]] Collected: 06/04/22 0736     Updated: 06/04/22 0740    Narrative:      HISTORY: Sepsis     CXR: Frontal view the chest obtained     COMPARISON: 5/19/2022     FINDINGS: Stable cardiomegaly. Similar positioning of left subclavian  cardiac pacer  leads. Prominent pulmonary vascular structures and  interstitial markings. Mild edema considered. No dense lobar  consolidation. Possible trace left pleural effusion. The right upper  lobe granuloma. No pneumothorax. Postoperative changes cervical spine.  L1 kyphoplasty change.       Impression:      1. Stable cardiomegaly with mild CHF type changes considered.  Questionable trace left pleural effusion. No dense lobar consolidation.  Left subclavian cardiac pacer device.  This report was finalized on 06/04/2022 07:37 by Dr. Trinity Gómez MD.          I have reviewed the patient's current medications.     Assessment/Plan     Active Hospital Problems    Diagnosis    • **Abscess, right hip    • Anemia of chronic disease    • Pressure injury of skin of heel    • Chronic pain    • Chronic indwelling Horan catheter    • Abscess of right hip      Added automatically from request for surgery [id number]     • Essential hypertension    • Rheumatoid arthritis involving multiple sites (HCC)    • Functional neurological symptom disorder with weakness or paralysis    • Chronic anticoagulation    • Chronic embolism and thrombosis of unspecified deep veins of left lower extremity (HCC)        Plan:  Patient admitted on 6/4 by Dr. Paul and Mayra Chong.  Patient has a history of infection in the right hip that she presents with having swelling/erythema/fluctuance over.  She had a CT scan in the ER showing fluid collection in the right hip musculature.    General surgery consulted.  Patient taken to OR today for incision/drainage/irrigation.  Cultures from OR pending.    Infectious disease consulted.  Continue empiric antibiotics, monitor cultures.    Plastic surgery consulted, appreciate assistance.    Home medications reviewed, and resume as appropriate.    Gentle IVF hydration              Discharge Planning: I expect the patient to be discharged to SNF in >2 days.    Electronically signed by Moises Oliveira MD, 06/04/22,  15:02 CDT.

## 2022-06-04 NOTE — PLAN OF CARE
Goal Outcome Evaluation:  Plan of Care Reviewed With: other (see comments)        Progress: no change  Outcome Evaluation: Ntn assessment competed per protocol. Pt s/p I&D of right hip. Oral intake 25% of one meal. Regular diet. Boost Plus added daily with lunch. Cont to follow for plan of care.

## 2022-06-04 NOTE — CONSULTS
INFECTIOUS DISEASES CONSULT NOTE    Patient:  Tawny Shin 66 y.o. female  ROOM # 354/1  YOB: 1955  MRN: [id number]  [facility]:  [id number]  Admit date: 6/3/2022   Admitting Physician: Moises Oliveira MD  Primary Care Physician: Leta Julian DO  REFERRING PROVIDER: Provider, No Known    Reason for Consultation: Abscess hip    History of Present Illness/Chief Complaint: 66-year-old woman.  Known to me from previous consultation.  She has had MRSA bacteremia in the past.  She had had previous gluteal muscle tear and repair right hip area.  She had breakdown of her repair.  She had a chronic wound.  She had additional debridement and antibiotic treatment.  She eventually had a flap closure the end of last year.  She has been residing in a nursing home.  She indicates she had done well.  Right hip wound has been healed.  She had not been having any fevers, chills, sweats, or problems with pain.  She apparently developed a little bit of swelling and discoloration right hip area.  She was not having fever.  She has had some baseline waxing and waning of her mental status, but mental status was slightly worse.  She was brought to the hospital.  She was felt to have probable abscess right hip area.  She was taken for operative exploration and drainage today.  She is currently receiving empiric antibiotic treatment with vancomycin and cefepime.  Infectious disease asked to evaluate and offer recommendations.    Current Scheduled Medications:   ascorbic acid, 500 mg, Oral, Daily  aspirin, 81 mg, Oral, Daily  carvedilol, 25 mg, Oral, BID With Meals  cefepime, 2 g, Intravenous, Q8H  collagenase, 1 application, Topical, Q24H  docusate sodium, 100 mg, Oral, BID  DULoxetine, 60 mg, Oral, Daily  famotidine, 40 mg, Oral, Daily  ferrous gluconate, 324 mg, Oral, Daily With Breakfast  fluticasone, 1 spray, Nasal, Daily  furosemide, 40 mg, Oral, Daily  isosorbide mononitrate, 60 mg, Oral, Daily  levothyroxine,  75 mcg, Oral, Q AM  lidocaine, 1 patch, Transdermal, Q24H  magnesium oxide, 400 mg, Oral, Daily  Menthol-Zinc Oxide, , Topical, BID  methylnaltrexone, 12 mg, Subcutaneous, Every Other Day  multivitamin with minerals, 1 tablet, Oral, Daily  polyethylene glycol, 17 g, Oral, Daily  saccharomyces boulardii, 250 mg, Oral, Daily  sennosides-docusate, 2 tablet, Oral, BID  sodium chloride, 10 mL, Intravenous, Q12H  sucralfate, 1 g, Oral, 4x Daily AC & at Bedtime  thiamine, 100 mg, Oral, Daily  valACYclovir, 500 mg, Oral, Daily  vancomycin, 1,000 mg, Intravenous, Q12H      Current PRN Medications:  •  acetaminophen  •  Diclofenac Sodium  •  HYDROcodone-acetaminophen  •  hydrocortisone-bacitracin-zinc oxide-nystatin  •  magnesium hydroxide  •  nitroglycerin  •  ondansetron **OR** ondansetron  •  [COMPLETED] Insert peripheral IV **AND** sodium chloride  •  sodium chloride  •  traMADol    Allergies:    Allergies   Allergen Reactions   • Atorvastatin Other (See Comments)     LEG CRAMPS     • Amoxicillin Rash   • Escitalopram Rash   • Nabumetone Rash   • Niacin Er Rash   • Penicillin G Rash   • Penicillins Rash   • Simvastatin Rash     Past Medical History:   Diagnosis Date   • Age-related osteoporosis with current pathological fracture 5/27/2020   • Arthritis     • Asthma     • Bilateral bunions 12/23/2020   • Cancer ([facility])     • Cardiac pacemaker syndrome 12/23/2020     Overview:  - heart block - implanted 11/16   • Charcot's joint of foot, left 12/23/2020   • Chronic deep vein thrombosis (DVT) of right lower extremity ([facility]) 6/23/2021   • Chronic pain syndrome 6/22/2021   • Chronic sinusitis     • COPD (chronic obstructive pulmonary disease) ([facility])     • Coronary artery disease     • 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   • Elevated cholesterol     • Eustachian tube dysfunction     • Heart disease     • Herpes simplex     • History of transfusion     • Hyperlipidemia     • Hypertension     • Hypothyroidism 12/23/2020   •  "Intrinsic asthma 12/23/2020   • Knee dislocation     • Labral tear of right hip joint     • Laryngitis sicca     • Laryngitis, chronic     • Left carotid bruit 3/9/2016   • MI (myocardial infarction) ([facility])     • Myalgia due to statin 6/25/2019   • Open wound of right hip 9/14/2021   • Osteomyelitis of right femur ([facility]) 7/6/2021   • Otorrhea     • Pacemaker 11/17/2016   • Primary osteoarthritis of left knee 12/23/2020   • Psoriasis vulgaris 12/23/2020   • S/P coronary artery stent placement 3/9/2016   • Sensorineural hearing loss     • Seropositive rheumatoid arthritis of multiple sites ([facility]) 12/27/2019     Overview:  -myochrysine '93-'96 -methotrexate '96--->11/98;r/s  restarted 2/99--> 8/14 (anemia) -sulfasalazine- not effective -penicillamine 6/98-->10/98; no effect -leflunomide 11/98--> - Humira '13-->didn't take - Enbrel 12/14-->3/15- no effect!   Last Assessment & Plan:  - \"aching all over\" because she had to be off her anti-rheumatic drugs for 2 weeks in preparation for her R knee surgery - he   • Sick sinus syndrome ([facility]) 12/27/2019   • Sjogren's disease ([facility])     • Spondylolisthesis of lumbar region 1/17/2018   • Syncope, recurrent 2/8/2021         Surgical History         Past Surgical History:   Procedure Laterality Date   • A-V CARDIAC PACEMAKER INSERTION   2016   • ATRIAL CARDIAC PACEMAKER INSERTION       • CARDIAC CATHETERIZATION       • CATARACT EXTRACTION       • CERVICAL CORPECTOMY N/A 3/3/2021     Procedure: CERVICAL 6 CORPECTOMY WITH TITANIUM CAGE WITH NEURO MONITORING;  Surgeon: Bandar Shea MD;  Location: [facility] OR;  Service: Neurosurgery;  Laterality: N/A;   • COLONOSCOPY   11/08/2011     One fold in the ascending colon which showed ulcer otherwise normal exam   • COLONOSCOPY   11/12/2004     Normal exam repeat in five years   • CORONARY ANGIOPLASTY WITH STENT PLACEMENT         X 2; 2013 & 2014   • ENDOSCOPY   07/10/2014     Normal exam   • FLAP LEG Right 9/14/2021     Procedure: " RIGHT GLUTEAL FASCIOCUTANEOUS ADVANCEMENT FLAP AND RIGHT TENSOR FASCIAL JESSICA FLAP;  Surgeon: Amadeo Turner MD;  Location:  [facility] OR;  Service: Plastics;  Laterality: Right;   • HIP ABDUCTION TENOTOMY BILATERAL Right 1/14/2021     Procedure: RIGHT HIP GLUTEUS MEDLUS / MINIMUS REPAIR, POSSIBLE ACHILLES ALLOGRAFT;  Surgeon: Nino Carlson MD;  Location:  [facility] OR;  Service: Orthopedics;  Laterality: Right;   • INCISION AND DRAINAGE HIP Right 2/9/2021     Procedure: HIP INCISION AND DRAINAGE;  Surgeon: Nino Carlson MD;  Location:  [facility] OR;  Service: Orthopedics;  Laterality: Right;   • INCISION AND DRAINAGE LEG Right 10/24/2021     Procedure: INCISION AND DRAINAGE LOWER EXTREMITY;  Surgeon: Amadeo Turner MD;  Location:  [facility] OR;  Service: Plastics;  Laterality: Right;   • INCISION AND DRAINAGE OF WOUND Right 7/8/2021     Procedure: INCISION AND DRAINAGE WOUND RIGHT HIP;  Surgeon: James Huntley MD;  Location:  [facility] OR;  Service: Orthopedics;  Laterality: Right;   • JOINT REPLACEMENT       • KYPHOPLASTY WITH BIOPSY Bilateral 10/26/2021     Procedure: THOARCIC 12 KYPHOPLASTY WITH BIOPSY;  Surgeon: Bandar Shea MD;  Location:  [facility] OR;  Service: Neurosurgery;  Laterality: Bilateral;   • LEG DEBRIDEMENT Right 9/14/2021     Procedure: DEBRIDEMENT OF RIGHT HIP WOUND, RIGHT GLUTEAL FASCIOCUTANEOUS ADVANCEMENT FLAP AND RIGHT TENSOR FASCIAL JESSICA FLAP;  Surgeon: Amadeo Turner MD;  Location:  [facility] OR;  Service: Plastics;  Laterality: Right;   • LUMBAR DISCECTOMY Right 3/23/2021     Procedure: LUMBAR DISCECTOMY MICRO, Lumbar 1/2 right;  Surgeon: Bandar Shea MD;  Location:  [facility] OR;  Service: Neurosurgery;  Laterality: Right;   • LUMBAR FUSION N/A 1/19/2018     Procedure: L3-4,L4-5 DECOMPRESSION, POSTERIOR SPINAL FUSION WITH INSTRUMENTATION;  Surgeon: Fortino Oropeza MD;  Location:  [facility] OR;  Service:    • LUMBAR LAMINECTOMY WITH FUSION Left 1/17/2018     Procedure: LEFT L3-4  "L4-5 LATERAL LUMBAR INTERBODY FUSION;  Surgeon: Fortino Oropeza MD;  Location: [facility] OR;  Service:    • MYRINGOTOMY W/ TUBES   2014     TUBES NO LONGER IN PLACE   • OTHER SURGICAL HISTORY         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 so hardware was removed and spacers were placed   • REPLACEMENT TOTAL KNEE Right                 Family History   Problem Relation Age of Onset   • Cancer Mother     • Heart disease Father        Social History   Social History           Socioeconomic History   • Marital status:    Tobacco Use   • Smoking status: Never Smoker   • Smokeless tobacco: Never Used   Substance and Sexual Activity   • Alcohol use: No   • Drug use: Never   • Sexual activity: Defer         Exposure History: No close contacts have been ill    Review of Systems  No cough or shortness of breath  No chest pain or chest pressure  No nausea vomiting  No diarrhea    Vital Signs:  /51 (BP Location: Right arm, Patient Position: Lying)   Pulse 69   Temp 97.7 °F (36.5 °C) (Oral)   Resp 18   Ht 167.6 cm (66\")   Wt 81.1 kg (178 lb 12.7 oz)   LMP  (LMP Unknown)   SpO2 98%   BMI 28.86 kg/m²  Temp (24hrs), Av.4 °F (36.9 °C), Min:97.7 °F (36.5 °C), Max:99.9 °F (37.7 °C)    Physical Exam  Vital signs - reviewed.  Line/IV site - No erythema or tenderness.  Lungs clear without crackles  Heart regular rhythm without murmur  Abdomen is soft and nontender  Right hip with operative dressing in place.  Currently clean and dry.  No apparent drainage evident on dressing.  No lower extremity or hip/buttock area erythema extending out from operative dressing.  Picture from right hip obtained earlier this morning reviewed and is outlined below.      Lab Results:  CBC:   Results from last 7 days   Lab Units 22  1111 22  2229   WBC 10*3/mm3 5.21 7.76   HEMOGLOBIN g/dL 7.9* 8.4*   HEMATOCRIT % 26.3* 27.1*   PLATELETS 10*3/mm3 222 241     CMP:   Results from last 7 days   Lab Units 22  1111 " 06/03/22  2229   SODIUM mmol/L 138 138   POTASSIUM mmol/L 3.7 3.7   CHLORIDE mmol/L 102 101   CO2 mmol/L 27.0 28.0   BUN mg/dL 15 18   CREATININE mg/dL 0.80 0.84   CALCIUM mg/dL 8.5* 8.4*   BILIRUBIN mg/dL 0.2 0.2   ALK PHOS U/L 114 129*   ALT (SGPT) U/L 11 13   AST (SGOT) U/L 16 18   GLUCOSE mg/dL 139* 113*     Radiology:   FINDINGS:  There is a 9.3 x 7.1 x 4.5 cm ill-defined collection within  the lateral soft tissues of the right hip/proximal thigh with Hounsfield  units measuring in the 40s. There is stranding of the adjacent  subcutaneous fat with overlying lateral skin thickening. A well-defined  walled off fluid collection is not localized. The edema extends into the  anterior lateral soft tissues of the right thigh.     Injection granuloma suspected within the subcutaneous tissues of the  right buttocks. There is advanced osteopenia. A lucency of the greater  trochanter may represent prior site of hardware removal. These  osteophytes of the greater trochanter. No radiographic evidence of  osteomyelitis. No acute regional bony pathology.     Horan catheter within the bladder. Diffuse vascular calcification.  Reactive right inguinal lymph nodes. Coarse calcifications uterus  suggesting small calcified leiomyoma.     IMPRESSION:  1. There is a isointense ill-defined collection within the lateral soft  tissues of the right hip with measurements provided above. Given the  Hounsfield units measuring in the 40s, inflammatory phlegmon favored  over fluid containing abscess collection. Extensive edema/inflammation  of the subcutaneous tissues with overlying skin thickening.  2. Osteopenia with chronic changes of the right hip. No acute osseous  pathology.     Comments: A preliminary report is issued to the ER by the [facility]  radiology service. This preliminary report describes similar findings as  above but suggest surrounding cellulitis and possible abscess. Again,  given the Hounsfield unit measurement of 40s and  the lack of a  well-defined wall-phlegmon and/or old hematoma favored over simple  abscess collection.  This report was finalized on 06/04/2022 10:16 by Dr. Trinity Gómez MD.      IMPRESSION:  1. Stable cardiomegaly with mild CHF type changes considered.  Questionable trace left pleural effusion. No dense lobar consolidation.  Left subclavian cardiac pacer device.  This report was finalized on 06/04/2022 07:37 by Dr. Trinity Gómez MD.    Additional Studies Reviewed:     Impression:   1.  Right hip abscess versus seroma/hematoma  2.  Previous gluteal muscle tear with failed repair, debridements, and eventual flap closure  3.  Previous history MRSA bacteremia  4.  History T12 compression fracture  5.  History epidural hematoma    Recommendations:    Continue current empiric antibiotic treatment with vancomycin and cefepime  Await operative cultures  Will adjust antibiotics based on culture results  Continue to follow    Elie Ohara MD  06/04/22  12:45 CDT

## 2022-06-04 NOTE — ANESTHESIA POSTPROCEDURE EVALUATION
Patient: Tawny Shin    Procedure Summary     Date: 06/04/22 Room / Location: [facility] OR  /  [facility] OR    Anesthesia Start: 0807 Anesthesia Stop: 0848    Procedure: INCISION AND DRAINAGE ABSCESS right hip (Right ) Diagnosis:       Abscess of right hip      (Abscess of right hip [L02.415])    Surgeons: Magda Salcido MD Provider: Robb Casas CRNA    Anesthesia Type: general ASA Status: 3 - Emergent          Anesthesia Type: general    Vitals  Vitals Value Taken Time   /47 06/04/22 0920   Temp 97.8 °F (36.6 °C) 06/04/22 0920   Pulse 63 06/04/22 0920   Resp 16 06/04/22 0920   SpO2 98 % 06/04/22 0920           Post Anesthesia Care and Evaluation    Patient location during evaluation: PACU  Patient participation: complete - patient participated  Level of consciousness: awake  Pain score: 2  Pain management: adequate  Airway patency: patent  Anesthetic complications: No anesthetic complications  PONV Status: none  Cardiovascular status: acceptable  Respiratory status: acceptable  Hydration status: acceptable    Comments: Criteria made for RR discharge

## 2022-06-05 PROBLEM — D63.8 ANEMIA OF CHRONIC DISEASE: Status: ACTIVE | Noted: 2022-06-05

## 2022-06-05 LAB — VANCOMYCIN TROUGH SERPL-MCNC: 21.3 MCG/ML (ref 5–20)

## 2022-06-05 PROCEDURE — 25010000002 VANCOMYCIN 1 G RECONSTITUTED SOLUTION 1 EACH VIAL: Performed by: SPECIALIST

## 2022-06-05 PROCEDURE — 25010000002 CEFEPIME PER 500 MG: Performed by: SPECIALIST

## 2022-06-05 PROCEDURE — 99024 POSTOP FOLLOW-UP VISIT: CPT | Performed by: SPECIALIST

## 2022-06-05 PROCEDURE — 80202 ASSAY OF VANCOMYCIN: CPT | Performed by: INTERNAL MEDICINE

## 2022-06-05 RX ADMIN — SODIUM CHLORIDE, POTASSIUM CHLORIDE, SODIUM LACTATE AND CALCIUM CHLORIDE 50 ML/HR: 600; 310; 30; 20 INJECTION, SOLUTION INTRAVENOUS at 20:20

## 2022-06-05 RX ADMIN — MAGNESIUM HYDROXIDE 10 ML: 2400 SUSPENSION ORAL at 18:32

## 2022-06-05 RX ADMIN — VALACYCLOVIR HYDROCHLORIDE 500 MG: 500 TABLET, FILM COATED ORAL at 08:50

## 2022-06-05 RX ADMIN — CEFEPIME 2 G: 2 INJECTION, POWDER, FOR SOLUTION INTRAVENOUS at 02:17

## 2022-06-05 RX ADMIN — FLUTICASONE PROPIONATE 1 SPRAY: 50 SPRAY, METERED NASAL at 08:51

## 2022-06-05 RX ADMIN — Medication 250 MG: at 08:50

## 2022-06-05 RX ADMIN — ACETAMINOPHEN 650 MG: 325 TABLET ORAL at 03:14

## 2022-06-05 RX ADMIN — LEVOTHYROXINE SODIUM 75 MCG: 75 TABLET ORAL at 05:57

## 2022-06-05 RX ADMIN — FUROSEMIDE 40 MG: 40 TABLET ORAL at 08:50

## 2022-06-05 RX ADMIN — DULOXETINE HYDROCHLORIDE 60 MG: 30 CAPSULE, DELAYED RELEASE ORAL at 08:51

## 2022-06-05 RX ADMIN — SUCRALFATE 1 G: 1 TABLET ORAL at 05:57

## 2022-06-05 RX ADMIN — ASPIRIN 81 MG: 81 TABLET, COATED ORAL at 08:51

## 2022-06-05 RX ADMIN — FAMOTIDINE 40 MG: 20 TABLET, FILM COATED ORAL at 08:50

## 2022-06-05 RX ADMIN — POLYETHYLENE GLYCOL 3350 17 G: 17 POWDER, FOR SOLUTION ORAL at 08:52

## 2022-06-05 RX ADMIN — Medication 10 ML: at 08:52

## 2022-06-05 RX ADMIN — OXYCODONE HYDROCHLORIDE AND ACETAMINOPHEN 500 MG: 500 TABLET ORAL at 08:51

## 2022-06-05 RX ADMIN — DOCUSATE SODIUM 50 MG AND SENNOSIDES 8.6 MG 2 TABLET: 8.6; 5 TABLET, FILM COATED ORAL at 08:50

## 2022-06-05 RX ADMIN — DOCUSATE SODIUM 50 MG AND SENNOSIDES 8.6 MG 2 TABLET: 8.6; 5 TABLET, FILM COATED ORAL at 20:20

## 2022-06-05 RX ADMIN — VANCOMYCIN HYDROCHLORIDE 1000 MG: 1 INJECTION, POWDER, LYOPHILIZED, FOR SOLUTION INTRAVENOUS at 04:51

## 2022-06-05 RX ADMIN — SUCRALFATE 1 G: 1 TABLET ORAL at 11:10

## 2022-06-05 RX ADMIN — CARVEDILOL 25 MG: 25 TABLET, FILM COATED ORAL at 08:50

## 2022-06-05 RX ADMIN — LIDOCAINE 1 PATCH: 50 PATCH CUTANEOUS at 08:51

## 2022-06-05 RX ADMIN — THIAMINE HCL TAB 100 MG 100 MG: 100 TAB at 08:50

## 2022-06-05 RX ADMIN — Medication 1 TABLET: at 08:50

## 2022-06-05 RX ADMIN — COLLAGENASE SANTYL 1 APPLICATION: 250 OINTMENT TOPICAL at 08:51

## 2022-06-05 RX ADMIN — ISOSORBIDE MONONITRATE 60 MG: 60 TABLET, EXTENDED RELEASE ORAL at 08:50

## 2022-06-05 RX ADMIN — ACETAMINOPHEN 650 MG: 325 TABLET ORAL at 20:20

## 2022-06-05 RX ADMIN — Medication 10 ML: at 20:20

## 2022-06-05 RX ADMIN — CEFEPIME 2 G: 2 INJECTION, POWDER, FOR SOLUTION INTRAVENOUS at 18:17

## 2022-06-05 RX ADMIN — CEFEPIME 2 G: 2 INJECTION, POWDER, FOR SOLUTION INTRAVENOUS at 11:10

## 2022-06-05 RX ADMIN — MAGNESIUM GLUCONATE 500 MG ORAL TABLET 400 MG: 500 TABLET ORAL at 08:50

## 2022-06-05 RX ADMIN — DOCUSATE SODIUM 100 MG: 100 CAPSULE, LIQUID FILLED ORAL at 20:20

## 2022-06-05 RX ADMIN — DOCUSATE SODIUM 100 MG: 100 CAPSULE, LIQUID FILLED ORAL at 08:51

## 2022-06-05 RX ADMIN — CARVEDILOL 25 MG: 25 TABLET, FILM COATED ORAL at 17:31

## 2022-06-05 RX ADMIN — SUCRALFATE 1 G: 1 TABLET ORAL at 20:20

## 2022-06-05 RX ADMIN — Medication 324 MG: at 08:50

## 2022-06-05 RX ADMIN — SUCRALFATE 1 G: 1 TABLET ORAL at 17:31

## 2022-06-05 NOTE — PROGRESS NOTES
"Pharmacy Dosing Service  Pharmacokinetics  Vancomycin Follow-up Evaluation    Assessment/Action/Plan:  Current Order: Vancomycin 1000 mg IVPB every 12 hours  Current end date:6/10/22    Levels: a trough level today collected 11.25 hours post dose 1000 mg is 21.3 mcg/mL. Calculations reviewed. Expected true trough ~ 20.3-20.6.     Additional antimicrobial agent(s): Cefepime    Hold vancomycin dose due at 1700 06/05/22, order discontinued    Reviewed new regimen:  Regimen: 1250 mg IV every 24 hours.  Start time: 0500 on 06/05/2022  Exposure target: AUC24 (range)400-600 mg/L.hr   AUC24,ss: 484 mg/L.hr  PAUC*: 85 %  Ctrough,ss: 13.3 mg/L  Pconc*: 7 %  Tox.: 8 %    Resume vancomycin tomorrow, 0500, with 1250 mg once daily dosing.   Pharmacy will continue to follow daily and adjust dose accordingly.     Subjective:  Tawny Shin is a 66 y.o. female currently on Vancomycin dose adjusted to 1250 mg IV every 24 hours for the treatment of SSTI (Right hip abscess, BLE non-healing wounds), day 2 of treatment.        Objective:  Ht: 167.6 cm (66\"); Wt: 81.1 kg (178 lb 12.7 oz)  Estimated Creatinine Clearance: 72.3 mL/min (by C-G formula based on SCr of 0.8 mg/dL).   Creatinine   Date Value Ref Range Status   06/04/2022 0.80 0.57 - 1.00 mg/dL Final   06/03/2022 0.84 0.57 - 1.00 mg/dL Final   04/25/2022 0.86 0.57 - 1.00 mg/dL Final   07/08/2020 0.90 0.60 - 1.30 mg/dL Final     Comment:     Serial Number: [id number]:  [id number]   09/03/2019 0.7 0.5 - 0.9 mg/dL Final   01/14/2019 0.5 0.5 - 0.9 mg/dL Final   01/07/2019 0.6 0.5 - 0.9 mg/dL Final      Lab Results   Component Value Date    WBC 5.21 06/04/2022    WBC 7.76 06/03/2022    WBC 4.45 04/25/2022         Lab Results   Component Value Date    [facility] 21.30 (H) 06/05/2022       Culture Results:  Microbiology Results (last 10 days)       Procedure Component Value - Date/Time    Wound Culture - Wound, Hip, Right [[id number]]  (Abnormal) Collected: 06/04/22 0834    Lab " Status: Preliminary result Specimen: Wound from Hip, Right Updated: 06/05/22 1224     Wound Culture Scant growth (1+) Staphylococcus aureus     Gram Stain Moderate (3+) WBCs seen      Few (2+) Gram positive cocci    Tissue / Bone Culture - Tissue, Hip, Right [[id number]]  (Abnormal) Collected: 06/04/22 0834    Lab Status: Preliminary result Specimen: Tissue from Hip, Right Updated: 06/05/22 1225     Tissue Culture Light growth (2+) Staphylococcus aureus     Gram Stain Many (4+) WBCs seen      Few (2+) Gram positive cocci    Wound Culture - Wound, Hip [[id number]]  (Abnormal) Collected: 06/04/22 0328    Lab Status: Preliminary result Specimen: Wound from Hip Updated: 06/05/22 1223     Wound Culture Light growth (2+) Staphylococcus aureus     Gram Stain Moderate (3+) WBCs seen      Few (2+) Gram positive cocci, intracellular and extracellular    Urine Culture - Urine, Urine, Clean Catch [[id number]]  (Abnormal) Collected: 06/04/22 0130    Lab Status: Preliminary result Specimen: Urine, Clean Catch Updated: 06/05/22 0953     Urine Culture >100,000 CFU/mL Gram Negative Bacilli    Narrative:      Colonization of the urinary tract without infection is common. Treatment is discouraged unless the patient is symptomatic, pregnant, or undergoing an invasive urologic procedure.  Colonization of the urinary tract without infection is common. Treatment is discouraged unless the patient is symptomatic, pregnant, or undergoing an invasive urologic procedure.    COVID PRE-OP / PRE-PROCEDURE SCREENING ORDER (NO ISOLATION) - Swab, Nasal Cavity [[id number]]  (Normal) Collected: 06/04/22 0126    Lab Status: Final result Specimen: Swab from Nasal Cavity Updated: 06/04/22 0208    Narrative:      The following orders were created for panel order COVID PRE-OP / PRE-PROCEDURE SCREENING ORDER (NO ISOLATION) - Swab, Nasal Cavity.  Procedure                               Abnormality         Status                     ---------                                -----------         ------                     COVID-19,[facility]...[[id number]]  Normal              Final result                 Please view results for these tests on the individual orders.    COVID-19,[facility] IN-HOUSE,Nasal Swab No Transport Media 3-4 HR TAT - Swab, Nasal Cavity [[id number]]  (Normal) Collected: 06/04/22 0126    Lab Status: Final result Specimen: Swab from Nasal Cavity Updated: 06/04/22 0208     COVID19 Not Detected    Narrative:      Fact sheet for providers: https://www.fda.gov/media/262206/download     Fact sheet for patients: https://www.fda.gov/media/023713/download    Test performed by PCR.    Consider negative results in combination with clinical observations, patient history, and epidemiological information.    Blood Culture - Blood, Arm, Right [[id number]]  (Normal) Collected: 06/03/22 2229    Lab Status: Preliminary result Specimen: Blood from Arm, Right Updated: 06/04/22 2247     Blood Culture No growth at 24 hours    Blood Culture - Blood, Arm, Right [[id number]]  (Normal) Collected: 06/03/22 2229    Lab Status: Preliminary result Specimen: Blood from Arm, Right Updated: 06/04/22 2247     Blood Culture No growth at 24 hours            Hair Ochoa, PharmD   06/05/22 16:52 CDT

## 2022-06-05 NOTE — PLAN OF CARE
Goal Outcome Evaluation:  Plan of Care Reviewed With: patient            Patient alert and oriented x4. VSS. RA. Bed bound. Q2 turn. Denies pain at rest. Mild pain with bed activity. Pain med offered refused to take. Incision site dressing CDI. Shea boots present BLLE. Mild edema BL foot. Kay present, draining yellow clear urine. CHG bath completed and kay care completed . Eating and drinking well. Safety precautions maintained Continious IVF and IV antibiotic as ordered. Continue to monitor.

## 2022-06-05 NOTE — PLAN OF CARE
Goal Outcome Evaluation:  Plan of Care Reviewed With: patient        Progress: no change  Outcome Evaluation: Pt A&Ox4, VSS. Reports of pain, pt requests tylenol with reported relief. Horan catheter in place to BSD. Dressing to R hip CDI. No reports of N/T. IVF infusing and antibiotics administered. Turned q 2 hours and heels elevated. Shea boots in place. Safety maintained, will continue to monitor.

## 2022-06-05 NOTE — PROGRESS NOTES
Magda Salcido MD FACS  Progress Note     LOS: 1 day   Patient Care Team:  Leta Julian DO as PCP - General (Family Medicine)  Moises Holman MD as Consulting Physician (Otolaryngology)  Christiano Rao PA as Physician Assistant (Otolaryngology)  Candelaria David MD as Obstetrician (Obstetrics and Gynecology)  Omar Hernandez MD as Cardiologist (Cardiology)  Leta Crawford APRN as Nurse Practitioner (Nurse Practitioner)      Subjective     Interval History:      no events.  No complaint     Objective     Vital Signs  Temp:  [97.9 °F (36.6 °C)-98.5 °F (36.9 °C)] 98.4 °F (36.9 °C)  Heart Rate:  [63-92] 63  Resp:  [18] 18  BP: (111-138)/(50-58) 118/56    Physical Exam:  General appearance - alert, well appearing, and in no distress  Extremities - decreased erythema, wound clean      Results Review:    Lab Results (last 24 hours)     Procedure Component Value Units Date/Time    Tissue / Bone Culture - Tissue, Hip, Right [[id number]]  (Abnormal) Collected: 06/04/22 0834    Specimen: Tissue from Hip, Right Updated: 06/05/22 1225     Tissue Culture Light growth (2+) Staphylococcus aureus     Gram Stain Many (4+) WBCs seen      Few (2+) Gram positive cocci    Wound Culture - Wound, Hip, Right [[id number]]  (Abnormal) Collected: 06/04/22 0834    Specimen: Wound from Hip, Right Updated: 06/05/22 1224     Wound Culture Scant growth (1+) Staphylococcus aureus     Gram Stain Moderate (3+) WBCs seen      Few (2+) Gram positive cocci    Wound Culture - Wound, Hip [[id number]]  (Abnormal) Collected: 06/04/22 0328    Specimen: Wound from Hip Updated: 06/05/22 1223     Wound Culture Light growth (2+) Staphylococcus aureus     Gram Stain Moderate (3+) WBCs seen      Few (2+) Gram positive cocci, intracellular and extracellular    Urine Culture - Urine, Urine, Clean Catch [[id number]]  (Abnormal) Collected: 06/04/22 0130    Specimen: Urine, Clean Catch Updated: 06/05/22 0953     Urine Culture >100,000 CFU/mL Gram  Negative Bacilli    Narrative:      Colonization of the urinary tract without infection is common. Treatment is discouraged unless the patient is symptomatic, pregnant, or undergoing an invasive urologic procedure.  Colonization of the urinary tract without infection is common. Treatment is discouraged unless the patient is symptomatic, pregnant, or undergoing an invasive urologic procedure.    Blood Culture - Blood, Arm, Right [[id number]]  (Normal) Collected: 06/03/22 2229    Specimen: Blood from Arm, Right Updated: 06/04/22 2247     Blood Culture No growth at 24 hours    Blood Culture - Blood, Arm, Right [[id number]]  (Normal) Collected: 06/03/22 2229    Specimen: Blood from Arm, Right Updated: 06/04/22 2247     Blood Culture No growth at 24 hours        Imaging Results (Last 24 Hours)     ** No results found for the last 24 hours. **            Assessment & Plan       POD#1 I and D right hip    Begin dressing changes  Place VAC        Magda Salcido MD  06/05/22  12:39 CDT

## 2022-06-06 LAB
ALBUMIN SERPL-MCNC: 2.6 G/DL (ref 3.5–5.2)
ALBUMIN/GLOB SERPL: 0.6 G/DL
ALP SERPL-CCNC: 110 U/L (ref 39–117)
ALT SERPL W P-5'-P-CCNC: 12 U/L (ref 1–33)
ANION GAP SERPL CALCULATED.3IONS-SCNC: 3 MMOL/L (ref 5–15)
AST SERPL-CCNC: 18 U/L (ref 1–32)
BACTERIA SPEC AEROBE CULT: ABNORMAL
BILIRUB SERPL-MCNC: 0.2 MG/DL (ref 0–1.2)
BUN SERPL-MCNC: 13 MG/DL (ref 8–23)
BUN/CREAT SERPL: 18.1 (ref 7–25)
CALCIUM SPEC-SCNC: 8.6 MG/DL (ref 8.6–10.5)
CHLORIDE SERPL-SCNC: 101 MMOL/L (ref 98–107)
CO2 SERPL-SCNC: 31 MMOL/L (ref 22–29)
CREAT SERPL-MCNC: 0.72 MG/DL (ref 0.57–1)
DEPRECATED RDW RBC AUTO: 51.6 FL (ref 37–54)
EGFRCR SERPLBLD CKD-EPI 2021: 91.2 ML/MIN/1.73
ERYTHROCYTE [DISTWIDTH] IN BLOOD BY AUTOMATED COUNT: 15.8 % (ref 12.3–15.4)
GLOBULIN UR ELPH-MCNC: 4.7 GM/DL
GLUCOSE SERPL-MCNC: 126 MG/DL (ref 65–99)
GRAM STN SPEC: ABNORMAL
HCT VFR BLD AUTO: 28.9 % (ref 34–46.6)
HGB BLD-MCNC: 8.6 G/DL (ref 12–15.9)
MCH RBC QN AUTO: 26.4 PG (ref 26.6–33)
MCHC RBC AUTO-ENTMCNC: 29.8 G/DL (ref 31.5–35.7)
MCV RBC AUTO: 88.7 FL (ref 79–97)
PLATELET # BLD AUTO: 220 10*3/MM3 (ref 140–450)
PMV BLD AUTO: 9.1 FL (ref 6–12)
POTASSIUM SERPL-SCNC: 4.2 MMOL/L (ref 3.5–5.2)
PROT SERPL-MCNC: 7.3 G/DL (ref 6–8.5)
RBC # BLD AUTO: 3.26 10*6/MM3 (ref 3.77–5.28)
SODIUM SERPL-SCNC: 135 MMOL/L (ref 136–145)
WBC NRBC COR # BLD: 4.74 10*3/MM3 (ref 3.4–10.8)

## 2022-06-06 PROCEDURE — 97161 PT EVAL LOW COMPLEX 20 MIN: CPT | Performed by: PHYSICAL THERAPIST

## 2022-06-06 PROCEDURE — 99024 POSTOP FOLLOW-UP VISIT: CPT | Performed by: SPECIALIST

## 2022-06-06 PROCEDURE — 99221 1ST HOSP IP/OBS SF/LOW 40: CPT | Performed by: NURSE PRACTITIONER

## 2022-06-06 PROCEDURE — 25010000002 CEFEPIME PER 500 MG: Performed by: SPECIALIST

## 2022-06-06 PROCEDURE — 97605 NEG PRS WND THER DME<=50SQCM: CPT | Performed by: PHYSICAL THERAPIST

## 2022-06-06 PROCEDURE — 80053 COMPREHEN METABOLIC PANEL: CPT | Performed by: INTERNAL MEDICINE

## 2022-06-06 PROCEDURE — 25010000002 VANCOMYCIN 10 G RECONSTITUTED SOLUTION: Performed by: SPECIALIST

## 2022-06-06 PROCEDURE — 85027 COMPLETE CBC AUTOMATED: CPT | Performed by: INTERNAL MEDICINE

## 2022-06-06 PROCEDURE — 25010000002 METHYLNALTREXONE 12 MG/0.6ML SOLUTION: Performed by: SPECIALIST

## 2022-06-06 PROCEDURE — 25010000002 DROPERIDOL PER 5 MG: Performed by: INTERNAL MEDICINE

## 2022-06-06 PROCEDURE — 97530 THERAPEUTIC ACTIVITIES: CPT | Performed by: PHYSICAL THERAPIST

## 2022-06-06 PROCEDURE — 25010000002 ONDANSETRON PER 1 MG: Performed by: SPECIALIST

## 2022-06-06 RX ORDER — DROPERIDOL 2.5 MG/ML
1.25 INJECTION, SOLUTION INTRAMUSCULAR; INTRAVENOUS EVERY 6 HOURS PRN
Status: DISCONTINUED | OUTPATIENT
Start: 2022-06-06 | End: 2022-06-14 | Stop reason: HOSPADM

## 2022-06-06 RX ORDER — LISINOPRIL 5 MG/1
5 TABLET ORAL
Status: DISCONTINUED | OUTPATIENT
Start: 2022-06-06 | End: 2022-06-07

## 2022-06-06 RX ADMIN — SUCRALFATE 1 G: 1 TABLET ORAL at 17:42

## 2022-06-06 RX ADMIN — LEVOTHYROXINE SODIUM 75 MCG: 75 TABLET ORAL at 06:31

## 2022-06-06 RX ADMIN — SUCRALFATE 1 G: 1 TABLET ORAL at 21:51

## 2022-06-06 RX ADMIN — CEFEPIME 2 G: 2 INJECTION, POWDER, FOR SOLUTION INTRAVENOUS at 18:05

## 2022-06-06 RX ADMIN — DOCUSATE SODIUM 100 MG: 100 CAPSULE, LIQUID FILLED ORAL at 21:50

## 2022-06-06 RX ADMIN — CEFEPIME 2 G: 2 INJECTION, POWDER, FOR SOLUTION INTRAVENOUS at 10:57

## 2022-06-06 RX ADMIN — DROPERIDOL 1.25 MG: 2.5 INJECTION, SOLUTION INTRAMUSCULAR; INTRAVENOUS at 17:18

## 2022-06-06 RX ADMIN — DROPERIDOL 1.25 MG: 2.5 INJECTION, SOLUTION INTRAMUSCULAR; INTRAVENOUS at 04:15

## 2022-06-06 RX ADMIN — APIXABAN 5 MG: 5 TABLET, FILM COATED ORAL at 21:51

## 2022-06-06 RX ADMIN — SUCRALFATE 1 G: 1 TABLET ORAL at 06:31

## 2022-06-06 RX ADMIN — FUROSEMIDE 40 MG: 40 TABLET ORAL at 08:47

## 2022-06-06 RX ADMIN — VALACYCLOVIR HYDROCHLORIDE 500 MG: 500 TABLET, FILM COATED ORAL at 08:48

## 2022-06-06 RX ADMIN — MAGNESIUM HYDROXIDE 10 ML: 2400 SUSPENSION ORAL at 17:41

## 2022-06-06 RX ADMIN — ONDANSETRON 4 MG: 2 INJECTION INTRAMUSCULAR; INTRAVENOUS at 02:25

## 2022-06-06 RX ADMIN — SUCRALFATE 1 G: 1 TABLET ORAL at 10:57

## 2022-06-06 RX ADMIN — THIAMINE HCL TAB 100 MG 100 MG: 100 TAB at 08:48

## 2022-06-06 RX ADMIN — CEFEPIME 2 G: 2 INJECTION, POWDER, FOR SOLUTION INTRAVENOUS at 03:31

## 2022-06-06 RX ADMIN — POLYETHYLENE GLYCOL 3350 17 G: 17 POWDER, FOR SOLUTION ORAL at 08:45

## 2022-06-06 RX ADMIN — ISOSORBIDE MONONITRATE 60 MG: 60 TABLET, EXTENDED RELEASE ORAL at 08:47

## 2022-06-06 RX ADMIN — DOCUSATE SODIUM 100 MG: 100 CAPSULE, LIQUID FILLED ORAL at 08:48

## 2022-06-06 RX ADMIN — FLUTICASONE PROPIONATE 1 SPRAY: 50 SPRAY, METERED NASAL at 08:46

## 2022-06-06 RX ADMIN — Medication 10 ML: at 09:13

## 2022-06-06 RX ADMIN — ASPIRIN 81 MG: 81 TABLET, COATED ORAL at 08:48

## 2022-06-06 RX ADMIN — FAMOTIDINE 40 MG: 20 TABLET, FILM COATED ORAL at 08:47

## 2022-06-06 RX ADMIN — Medication: at 21:51

## 2022-06-06 RX ADMIN — LIDOCAINE 1 PATCH: 50 PATCH CUTANEOUS at 08:45

## 2022-06-06 RX ADMIN — VANCOMYCIN HYDROCHLORIDE 1250 MG: 10 INJECTION, POWDER, LYOPHILIZED, FOR SOLUTION INTRAVENOUS at 06:31

## 2022-06-06 RX ADMIN — OXYCODONE HYDROCHLORIDE AND ACETAMINOPHEN 500 MG: 500 TABLET ORAL at 08:48

## 2022-06-06 RX ADMIN — Medication 324 MG: at 08:48

## 2022-06-06 RX ADMIN — LISINOPRIL 5 MG: 5 TABLET ORAL at 15:43

## 2022-06-06 RX ADMIN — ACETAMINOPHEN 650 MG: 325 TABLET ORAL at 10:02

## 2022-06-06 RX ADMIN — ONDANSETRON 4 MG: 2 INJECTION INTRAMUSCULAR; INTRAVENOUS at 10:57

## 2022-06-06 RX ADMIN — CARVEDILOL 25 MG: 25 TABLET, FILM COATED ORAL at 17:42

## 2022-06-06 RX ADMIN — SODIUM CHLORIDE, POTASSIUM CHLORIDE, SODIUM LACTATE AND CALCIUM CHLORIDE 50 ML/HR: 600; 310; 30; 20 INJECTION, SOLUTION INTRAVENOUS at 17:04

## 2022-06-06 RX ADMIN — DULOXETINE HYDROCHLORIDE 60 MG: 30 CAPSULE, DELAYED RELEASE ORAL at 08:48

## 2022-06-06 RX ADMIN — DOCUSATE SODIUM 50 MG AND SENNOSIDES 8.6 MG 2 TABLET: 8.6; 5 TABLET, FILM COATED ORAL at 21:50

## 2022-06-06 RX ADMIN — CARVEDILOL 25 MG: 25 TABLET, FILM COATED ORAL at 08:48

## 2022-06-06 RX ADMIN — MAGNESIUM GLUCONATE 500 MG ORAL TABLET 400 MG: 500 TABLET ORAL at 08:48

## 2022-06-06 RX ADMIN — METHYLNALTREXONE BROMIDE 12 MG: 12 INJECTION, SOLUTION SUBCUTANEOUS at 08:46

## 2022-06-06 RX ADMIN — Medication 250 MG: at 08:47

## 2022-06-06 RX ADMIN — DOCUSATE SODIUM 50 MG AND SENNOSIDES 8.6 MG 2 TABLET: 8.6; 5 TABLET, FILM COATED ORAL at 08:48

## 2022-06-06 RX ADMIN — Medication 1 TABLET: at 08:48

## 2022-06-06 NOTE — PROGRESS NOTES
"    [facility]  INPATIENT PROGRESS NOTE    Patient Name: Tawny Shin  Date of Admission: 6/3/2022  Today's Date: 06/05/22  Length of Stay: 1  Primary Care Physician: Leta Julian DO    Subjective   Chief Complaint: right hip pain  HPI     Patient doing well.  No complaints.  Patient voices frustration to be \"going through this again\".  The patient did not have any fever or chills.  Denies nausea and vomiting.          Review of Systems   Constitutional: Positive for activity change and fatigue. Negative for appetite change, chills and fever.   Respiratory: Negative for cough and shortness of breath.    Cardiovascular: Negative for chest pain and palpitations.   Gastrointestinal: Negative for abdominal distention, abdominal pain, diarrhea, nausea and vomiting.   Skin: Positive for wound.   Neurological: Positive for weakness.        All pertinent negatives and positives are as above. All other systems have been reviewed and are negative unless otherwise stated.     Objective    Temp:  [97.9 °F (36.6 °C)-98.6 °F (37 °C)] 98.6 °F (37 °C)  Heart Rate:  [63-92] 67  Resp:  [18] 18  BP: (112-143)/(50-61) 143/61  Physical Exam  Vitals and nursing note reviewed.   Constitutional:       Appearance: Normal appearance.   HENT:      Head: Normocephalic and atraumatic.      Nose: Nose normal. No congestion or rhinorrhea.      Mouth/Throat:      Mouth: Mucous membranes are moist.      Pharynx: Oropharynx is clear.   Eyes:      General: No scleral icterus.     Conjunctiva/sclera: Conjunctivae normal.   Cardiovascular:      Rate and Rhythm: Normal rate and regular rhythm.   Pulmonary:      Effort: Pulmonary effort is normal. No respiratory distress.      Breath sounds: Normal breath sounds. No stridor.   Abdominal:      General: Abdomen is flat. Bowel sounds are normal. There is no distension.      Palpations: Abdomen is soft. There is no mass.   Skin:     General: Skin " is warm and dry.      Coloration: Skin is pale. Skin is not jaundiced.   Neurological:      Mental Status: She is alert and oriented to person, place, and time. Mental status is at baseline.   Psychiatric:         Mood and Affect: Mood normal.         Behavior: Behavior normal.           Results Review:  I have reviewed the labs, radiology results, and diagnostic studies.    Laboratory Data:   Results from last 7 days   Lab Units 06/04/22  1111 06/03/22  2229   WBC 10*3/mm3 5.21 7.76   HEMOGLOBIN g/dL 7.9* 8.4*   HEMATOCRIT % 26.3* 27.1*   PLATELETS 10*3/mm3 222 241        Results from last 7 days   Lab Units 06/04/22  1111 06/03/22  2229   SODIUM mmol/L 138 138   POTASSIUM mmol/L 3.7 3.7   CHLORIDE mmol/L 102 101   CO2 mmol/L 27.0 28.0   BUN mg/dL 15 18   CREATININE mg/dL 0.80 0.84   CALCIUM mg/dL 8.5* 8.4*   BILIRUBIN mg/dL 0.2 0.2   ALK PHOS U/L 114 129*   ALT (SGPT) U/L 11 13   AST (SGOT) U/L 16 18   GLUCOSE mg/dL 139* 113*       Culture Data:   No results found for: BLOODCX, URINECX, WOUNDCX, MRSACX, RESPCX, STOOLCX    Radiology Data:   Imaging Results (Last 24 Hours)     ** No results found for the last 24 hours. **          I have reviewed the patient's current medications.     Assessment/Plan     Active Hospital Problems    Diagnosis    • **Abscess, right hip    • Anemia of chronic disease    • Pressure injury of skin of heel    • Chronic pain    • Chronic indwelling Horan catheter    • Abscess of right hip      Added automatically from request for surgery [id number]     • Essential hypertension    • Rheumatoid arthritis involving multiple sites (HCC)    • Functional neurological symptom disorder with weakness or paralysis    • Chronic anticoagulation    • Chronic embolism and thrombosis of unspecified deep veins of left lower extremity (HCC)        Plan:  Patient admitted on 6/4 by Dr. Paul and Mayra Chong.  Patient has a history of infection in the right hip that she presents with having  swelling/erythema/fluctuance over.  She had a CT scan in the ER showing fluid collection in the right hip musculature.    General surgery consulted.  Patient taken to OR today for incision/drainage/irrigation.  Cultures from OR showed gram positive cocci then subsequently..    Infectious disease consulted.  Continue empiric antibiotics, monitor cultures.    Plastic surgery consulted, appreciate assistance.    Home medications reviewed, and resume as appropriate.    Gentle IVF hydration    Likely wound vac.    SW consult for placement, curious as to if patient has any skilled days left?                Discharge Planning: I expect the patient to be discharged to SNF in >2 days.    Electronically signed by Moises Oliveira MD, 06/05/22, 20:52 CDT.

## 2022-06-06 NOTE — THERAPY WOUND CARE TREATMENT
Acute Care - Wound/Debridement Initial Evaluation  [facility]     Patient Name: Tawny Shin  : 1955  MRN: [id number]  Today's Date: 2022                Admit Date: 6/3/2022    Visit Dx:    ICD-10-CM ICD-9-CM   1. Abscess of right hip  L02.415 682.6   2. Impaired mobility  Z74.09 799.89       Patient Active Problem List   Diagnosis   • T12 compression fracture, initial encounter ([facility])   • Chronic embolism and thrombosis of unspecified deep veins of left lower extremity ([facility])   • Urinary tract infection without hematuria   • Acute bilateral thoracic back pain   • Chronic anticoagulation   • Hypokalemia   • Transaminitis   • Iron deficiency anemia   • Osteoporosis   • Bacteremia   • Epidural hematoma ([facility])   • Pleural effusion, left   • Functional neurological symptom disorder with weakness or paralysis   • Overweight (BMI 25.0-29.9)   • Rheumatoid arthritis involving multiple sites ([facility])   • (HFpEF) heart failure with preserved ejection fraction ([facility]), acute on chronic   • Venous insufficiency   • Coronary artery disease involving native coronary artery of native heart without angina pectoris   • Sick sinus syndrome ([facility])   • Essential hypertension   • Pressure injury of skin of heel   • Abscess, right hip   • Chronic pain   • Chronic indwelling Horan catheter   • Abscess of right hip   • Anemia of chronic disease        Past Medical History:   Diagnosis Date   • Age-related osteoporosis with current pathological fracture 2020   • Arthritis    • Asthma    • Bilateral bunions 2020   • Cancer ([facility])    • Cardiac pacemaker syndrome 2020    Overview:  - heart block - implanted    • Charcot's joint of foot, left 2020   • Chronic deep vein thrombosis (DVT) of right lower extremity ([facility]) 2021   • Chronic pain syndrome 2021   • Chronic sinusitis    • COPD (chronic obstructive pulmonary disease) ([facility])    • Coronary artery disease    • 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)    • Myalgia due to statin 6/25/2019   • Open wound of right hip 9/14/2021   • Osteomyelitis of right femur ([facility]) 7/6/2021   • Otorrhea    • Pacemaker 11/17/2016   • Primary osteoarthritis of left knee 12/23/2020   • Psoriasis vulgaris 12/23/2020   • S/P coronary artery stent placement 3/9/2016   • Sensorineural hearing loss    • Seropositive rheumatoid arthritis of multiple sites ([facility]) 12/27/2019    Overview:  -myochrysine '93-'96 -methotrexate '96--->11/98;r/s  restarted 2/99--> 8/14 (anemia) -sulfasalazine- not effective -penicillamine 6/98-->10/98; no effect -leflunomide 11/98--> - Humira '13-->didn't take - Enbrel 12/14-->3/15- no effect!   Last Assessment & Plan:  - \"aching all over\" because she had to be off her anti-rheumatic drugs for 2 weeks in preparation for her R knee surgery - he   • Sick sinus syndrome ([facility]) 12/27/2019   • Sjogren's disease ([facility])    • Spondylolisthesis of lumbar region 1/17/2018   • Syncope, recurrent 2/8/2021        Past Surgical History:   Procedure Laterality Date   • A-V CARDIAC PACEMAKER INSERTION  2016   • ATRIAL CARDIAC PACEMAKER INSERTION     • CARDIAC CATHETERIZATION     • CATARACT EXTRACTION     • CERVICAL CORPECTOMY N/A 3/3/2021    Procedure: CERVICAL 6 CORPECTOMY WITH TITANIUM CAGE WITH NEURO MONITORING;  Surgeon: Bandar Shea MD;  Location: [facility];  Service: Neurosurgery;  Laterality: N/A;   • COLONOSCOPY  11/08/2011    One fold in the ascending colon which showed ulcer otherwise normal exam   • COLONOSCOPY  11/12/2004    Normal exam repeat in five years "   • CORONARY ANGIOPLASTY WITH STENT PLACEMENT      X 2; 2013 & 2014   • ENDOSCOPY  07/10/2014    Normal exam   • FLAP LEG Right 9/14/2021    Procedure: RIGHT GLUTEAL FASCIOCUTANEOUS ADVANCEMENT FLAP AND RIGHT TENSOR FASCIAL JESSICA FLAP;  Surgeon: Amadeo Turner MD;  Location:  [facility];  Service: Plastics;  Laterality: Right;   • HIP ABDUCTION TENOTOMY BILATERAL Right 1/14/2021    Procedure: RIGHT HIP GLUTEUS MEDLUS / MINIMUS REPAIR, POSSIBLE ACHILLES ALLOGRAFT;  Surgeon: Nino Carlson MD;  Location:  [facility];  Service: Orthopedics;  Laterality: Right;   • INCISION AND DRAINAGE ABSCESS Right 6/4/2022    Procedure: INCISION AND DRAINAGE ABSCESS right hip;  Surgeon: Magda Salcido MD;  Location:  [facility];  Service: General;  Laterality: Right;   • INCISION AND DRAINAGE HIP Right 2/9/2021    Procedure: HIP INCISION AND DRAINAGE;  Surgeon: Nino Carlson MD;  Location:  [facility];  Service: Orthopedics;  Laterality: Right;   • INCISION AND DRAINAGE LEG Right 10/24/2021    Procedure: INCISION AND DRAINAGE LOWER EXTREMITY;  Surgeon: Amadeo Turner MD;  Location:  [facility];  Service: Plastics;  Laterality: Right;   • INCISION AND DRAINAGE OF WOUND Right 7/8/2021    Procedure: INCISION AND DRAINAGE WOUND RIGHT HIP;  Surgeon: James Huntley MD;  Location:  [facility];  Service: Orthopedics;  Laterality: Right;   • JOINT REPLACEMENT     • KYPHOPLASTY WITH BIOPSY Bilateral 10/26/2021    Procedure: THOARCIC 12 KYPHOPLASTY WITH BIOPSY;  Surgeon: Bandar Shea MD;  Location:  [facility];  Service: Neurosurgery;  Laterality: Bilateral;   • LEG DEBRIDEMENT Right 9/14/2021    Procedure: DEBRIDEMENT OF RIGHT HIP WOUND, RIGHT GLUTEAL FASCIOCUTANEOUS ADVANCEMENT FLAP AND RIGHT TENSOR FASCIAL JESSICA FLAP;  Surgeon: Amadeo Turner MD;  Location:  [facility];  Service: Plastics;  Laterality: Right;   • LUMBAR DISCECTOMY Right 3/23/2021    Procedure: LUMBAR DISCECTOMY MICRO, Lumbar 1/2 right;  Surgeon: Shabbir  Bandar Vizcaino MD;  Location:  [facility];  Service: Neurosurgery;  Laterality: Right;   • LUMBAR FUSION N/A 1/19/2018    Procedure: L3-4,L4-5 DECOMPRESSION, POSTERIOR SPINAL FUSION WITH INSTRUMENTATION;  Surgeon: Fortino Oropeza MD;  Location:  [facility];  Service:    • LUMBAR LAMINECTOMY WITH FUSION Left 1/17/2018    Procedure: LEFT L3-4 L4-5 LATERAL LUMBAR INTERBODY FUSION;  Surgeon: Fortino Oropeza MD;  Location:  [facility];  Service:    • MYRINGOTOMY W/ TUBES  09/04/2014    TUBES NO LONGER IN PLACE   • OTHER SURGICAL HISTORY      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 so hardware was removed and spacers were placed   • REPLACEMENT TOTAL KNEE Right            Wound Right lateral leg Pressure Injury (Active)   Dressing Appearance dry;intact;no drainage 06/05/22 2020   Closure ABIEL 06/06/22 0859   Base dressing in place, unable to visualize 06/06/22 0859       Wound Left posterior heel Pressure Injury (Active)   Dressing Appearance dry;intact;no drainage 06/05/22 2020   Closure ABIEL 06/06/22 0859   Base dressing in place, unable to visualize 06/06/22 0859       Wound 06/04/22 0211 Right anterior greater trochanter (Active)   Wound Image   06/06/22 1000   Dressing Appearance intact;moist drainage 06/06/22 1000   Closure Other (Comment) 06/06/22 1000   Base blanchable;clean;moist;subcutaneous;pink;red 06/06/22 1000   Red (%), Wound Tissue Color 100 06/06/22 1000   Periwound blanchable;pink 06/06/22 1000   Periwound Temperature warm 06/06/22 1000   Periwound Skin Turgor soft 06/06/22 1000   Edges open 06/06/22 1000   Wound Length (cm) 5.2 cm 06/06/22 1000   Wound Width (cm) 2.5 cm 06/06/22 1000   Wound Depth (cm) 7.3 cm 06/06/22 1000   Wound Surface Area (cm^2) 13 cm^2 06/06/22 1000   Wound Volume (cm^3) 94.9 cm^3 06/06/22 1000   Drainage Characteristics/Odor serosanguineous 06/06/22 1000   Drainage Amount moderate 06/06/22 1000   Care, Wound negative pressure wound therapy 06/06/22 1000   Dressing Care dressing changed  06/06/22 1000       Wound 06/04/22 0827 Right anterior hip Incision (Active)   Dressing Appearance dry;intact 06/06/22 0859   Closure ABIEL 06/06/22 0859   Base dressing in place, unable to visualize 06/06/22 0859   Drainage Amount none 06/06/22 0859   Dressing Care foam;low-adherent 06/05/22 2020       NPWT (Negative Pressure Wound Therapy) 06/06/22 1000 R hip (Active)   Therapy Setting continuous therapy 06/06/22 1000   Dressing foam, black 06/06/22 1000   Pressure Setting 125 mmHg 06/06/22 1000   Sponges Inserted 2 06/06/22 1000   Finger sweep complete Yes 06/06/22 1000         WOUND DEBRIDEMENT                     PT Assessment (last 12 hours)     PT Evaluation and Treatment     Row Name 06/06/22 1000          Physical Therapy Time and Intention    Subjective Information complains of;pain  -MS     Document Type evaluation;wound care  -MS     Mode of Treatment physical therapy  -MS     Row Name 06/06/22 1000          General Information    Patient Profile Reviewed yes  -MS     Pertinent History of Current Functional Problem s/p debridement of R hip abscess, hx: paraplegic  -MS     Existing Precautions/Restrictions fall;other (see comments)  wound vac  -MS     Risks Reviewed patient:;increased discomfort;increased drainage  -MS     Benefits Reviewed patient:;improve skin integrity;increase knowledge  -MS     Barriers to Rehab physical barrier;cognitive status  -MS     Row Name 06/06/22 1000          Pain    Pretreatment Pain Rating 0/10 - no pain  -MS     Posttreatment Pain Rating 5/10  -MS     Pain Location - Side/Orientation Right  -MS     Pain Location - hip  -MS     Pain Intervention(s) Medication (See MAR)  -MS     Row Name 06/06/22 1000          Cognition    Orientation Status (Cognition) oriented x 4  -MS     Row Name 06/06/22 1000          Health Promotion    Additional Documentation NPWT (Negative Pressure Wound Therapy) (LDA)  -MS     Row Name 06/06/22 1000          Wound 06/04/22 0211 Right anterior  greater trochanter    Wound - Properties Group Placement Date: 06/04/22  -LV Placement Time: 0211 -LV Present on Hospital Admission: Y  -LV Side: Right  -LV Orientation: anterior  -LV Location: greater trochanter  -LV     Wound Image View All Images View Images  -MS     Dressing Appearance intact;moist drainage  -MS     Closure Other (Comment)  NPWT dressing  -MS     Base blanchable;clean;moist;subcutaneous;pink;red  -MS     Red (%), Wound Tissue Color 100  -MS     Periwound blanchable;pink  -MS     Periwound Temperature warm  -MS     Periwound Skin Turgor soft  -MS     Edges open  -MS     Wound Length (cm) 5.2 cm  -MS     Wound Width (cm) 2.5 cm  -MS     Wound Depth (cm) 7.3 cm  -MS     Wound Surface Area (cm^2) 13 cm^2  -MS     Wound Volume (cm^3) 94.9 cm^3  -MS     Drainage Characteristics/Odor serosanguineous  -MS     Drainage Amount moderate  -MS     Care, Wound negative pressure wound therapy  -MS     Dressing Care dressing changed  -MS     Retired Wound - Properties Group Placement Date: 06/04/22  -LV Placement Time: 0211 -LV Present on Hospital Admission: Y  -LV Side: Right  -LV Orientation: anterior  -LV Location: greater trochanter  -LV     Retired Wound - Properties Group Date first assessed: 06/04/22  -LV Time first assessed: 0211 -LV Present on Hospital Admission: Y  -LV Side: Right  -LV Location: greater trochanter  -LV     Row Name             Wound Left posterior heel Pressure Injury    Wound - Properties Group Side: Left  -MT Orientation: posterior  -MT Location: heel  -MT Primary Wound Type: Pressure inj  -MT Wound Outcome: Healed  -MT     Retired Wound - Properties Group Side: Left  -MT Orientation: posterior  -MT Location: heel  -MT Primary Wound Type: Pressure inj  -MT Wound Outcome: Healed  -MT     Retired Wound - Properties Group Side: Left  -MT Location: heel  -MT Primary Wound Type: Pressure inj  -MT Wound Outcome: Healed  -MT     Row Name             Wound Right lateral leg Pressure  Injury    Wound - Properties Group Present on Hospital Admission: Y  -MT Side: Right  -MT Orientation: lateral  -MT Location: leg  -MT Primary Wound Type: Pressure inj  -MT     Retired Wound - Properties Group Present on Hospital Admission: Y  -MT Side: Right  -MT Orientation: lateral  -MT Location: leg  -MT Primary Wound Type: Pressure inj  -MT     Retired Wound - Properties Group Present on Hospital Admission: Y  -MT Side: Right  -MT Location: leg  -MT Primary Wound Type: Pressure inj  -MT     Row Name             Wound 04/22/22 1726 Left anterior greater trochanter    Wound - Properties Group Placement Date: 04/22/22  -MT Placement Time: 1726  -MT Present on Hospital Admission: Y  -MT Side: Left  -MT Orientation: anterior  -MT Location: greater trochanter  -MT     Retired Wound - Properties Group Placement Date: 04/22/22  -MT Placement Time: 1726  -MT Present on Hospital Admission: Y  -MT Side: Left  -MT Orientation: anterior  -MT Location: greater trochanter  -MT     Retired Wound - Properties Group Date first assessed: 04/22/22  -MT Time first assessed: 1726  -MT Present on Hospital Admission: Y  -MT Side: Left  -MT Location: greater trochanter  -MT     Row Name             Wound 06/04/22 0827 Right anterior hip Incision    Wound - Properties Group Placement Date: 06/04/22  -HW Placement Time: 0827  -HW Side: Right  -HW Orientation: anterior  -HW Location: hip  -HW Primary Wound Type: Incision  -HW     Retired Wound - Properties Group Placement Date: 06/04/22  -HW Placement Time: 0827  -HW Side: Right  -HW Orientation: anterior  -HW Location: hip  -HW Primary Wound Type: Incision  -HW     Retired Wound - Properties Group Date first assessed: 06/04/22  -HW Time first assessed: 0827  -HW Side: Right  -HW Location: hip  -HW Primary Wound Type: Incision  -HW     Row Name 06/06/22 1000          NPWT (Negative Pressure Wound Therapy) 06/06/22 1000 R hip    NPWT (Negative Pressure Wound Therapy) - Properties Group  Placement Date: 06/06/22  -MS Placement Time: 1000  -MS Location: R hip  -MS     Therapy Setting continuous therapy  -MS     Dressing foam, black  -MS     Pressure Setting 125 mmHg  -MS     Sponges Inserted 2  1 in wound and one on top for track pad  -MS     Finger sweep complete Yes  -MS     Retired NPWT (Negative Pressure Wound Therapy) - Properties Group Placement Date: 06/06/22  -MS Placement Time: 1000  -MS Location: R hip  -MS     Retired NPWT (Negative Pressure Wound Therapy) - Properties Group Placement Date: 06/06/22  -MS Placement Time: 1000  -MS Location: R hip  -MS     Row Name 06/06/22 1000          Plan of Care Review    Plan of Care Reviewed With patient  -MS     Progress no change  -MS     Outcome Evaluation The patient presents alert and oriented x4. She has a dressing on her R lateral thigh from recent I and D due to abscess. The dressing is saturated and saturated outside the dressing. The dressing was removed and a health wound base was seen. She does have a deep area straight down that does not need to be packed, but I did pack the opening to prevent it from closing to form another abscess. The dressing was placed and a good seal was achieved. PT to continue to follow for dressing changes MWF or as needed.  -MS     Row Name 06/06/22 1000          Positioning and Restraints    Post Treatment Position bed  -MS     In Bed fowlers;call light within reach;encouraged to call for assist;with family/caregiver;side rails up x2;R heel elevated  -MS     Row Name 06/06/22 1000          Therapy Assessment/Plan (PT)    Patient/Family Therapy Goals Statement (PT) wound healing  -MS     Rehab Potential (PT) fair, will monitor progress closely  -MS     Criteria for Skilled Interventions Met (PT) yes;meets criteria;skilled treatment is necessary  -MS     Therapy Frequency (PT) 3 times/wk  check daily  -MS     Predicted Duration of Therapy Intervention (PT) until discharge  -MS     Row Name 06/06/22 1000           Physical Therapy Goals    Wound Care Goal Selection (PT) wound care, PT goal 1;wound care, PT goal 2  -MS     Row Name 06/06/22 1000          Wound Care Goal 1 (PT)    Wound Care Goal 1 (PT) The R hip wound will decrease in length by 1cm from 5.2 cm to 4.2 cm.  -MS     Time Frame (Wound Care Goal 1, PT) long term goal (LTG);by discharge  -MS     Progress/Outcome (Wound Care Goal 1, PT) goal ongoing  -MS     Row Name 06/06/22 1000          Wound Care Goal 2 (PT)    Wound Care Goal 2 (PT) The R hip wound will decrease in depth by 2 cm from 7.3cm to 5.3cm.  -MS     Time Frame (Wound Care Goal 2, PT) long term goal (LTG);by discharge  -MS     Progress/Outcome (Wound Care Goal 2, PT) goal ongoing  -MS           User Key  (r) = Recorded By, (t) = Taken By, (c) = Cosigned By    Initials Name Provider Type    MS Nicole Olson, PT, DPT, NCS Physical Therapist    Leeann Younger RN Registered Nurse     Benjie Motta RN Registered Nurse    Consuelo Rodríguez RN Registered Nurse              Physical Therapy Education                 Title: PT OT SLP Therapies (In Progress)     Topic: Physical Therapy (In Progress)     Point: Mobility training (Not Started)     Learner Progress:  Not documented in this visit.          Point: Home exercise program (Not Started)     Learner Progress:  Not documented in this visit.          Point: Body mechanics (Not Started)     Learner Progress:  Not documented in this visit.          Point: Precautions (Done)     Learning Progress Summary           Patient Acceptance, E, VU by MS at 6/6/2022 1138    Comment: wound vac role in pt care and healing                               User Key     Initials Effective Dates Name Provider Type Discipline    MS 06/19/18 -  Nicole Olson, PT, DPT, NCS Physical Therapist PT                Recommendation and Plan  Anticipated Discharge Disposition (PT): skilled nursing facility  Planned Therapy Interventions (PT): bed mobility training,  patient/family education, strengthening  Therapy Frequency (PT): 2 times/day  Plan of Care Reviewed With: patient   Progress: no change       Progress: no change  Outcome Evaluation: The patient presents alert and oriented x4 with complaints of nausea. 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 She uses a lift to get into and out of bed and is wheelchair bound. She does demonstrates improved LE strength since the last time I saw her. Her L LE is strong enough to assist with functional movement. PT will continue to work with her to work on bed mobility, and LE strengthening.  Plan of Care Reviewed With: patient            Time Calculation   PT Charges     Row Name 06/06/22 1300 06/06/22 1137          Time Calculation    Start Time 1300  -MS 1000  -MS     Stop Time 1355  -MS 1100  -MS     Time Calculation (min) 55 min  -MS 60 min  -MS     PT Received On 06/06/22  -MS 06/06/22  -MS     PT Goal Re-Cert Due Date 06/17/22  -MS 06/17/22  -MS            Time Calculation- PT    Total Timed Code Minutes- PT 25 minute(s)  -MS --            Timed Charges    22147 - PT Therapeutic Activity Minutes 25  -MS --            Untimed Charges    PT Eval/Re-eval Minutes 60  -MS --  added to time in the afternoon  -MS     Wound Care -- 25362 Neg Press (DME) wound TO 50 sqcm  -MS     38654-Oyp Pressure wound to 50 sqcm -- 30  -MS            Total Minutes    Timed Charges Total Minutes 25  -MS --     Untimed Charges Total Minutes 60  -MS 30  -MS      Total Minutes 85  -MS 30  -MS           User Key  (r) = Recorded By, (t) = Taken By, (c) = Cosigned By    Initials Name Provider Type    MS Nicole Olson, PT, DPT, NCS Physical Therapist                  Therapy Charges for Today     Code Description Service Date Service Provider Modifiers Qty    08618570294 HC PT NEG PRESS WOUND TO 50SQCM DME2 6/6/2022 Nicole Olson, PT, DPT, NCS GP 1    10985256991 HC  PT THERAPEUTIC ACT EA 15 MIN 6/6/2022 Nicole Olson, PT, DPT, NCS GP 2    [id number] HC PT EVAL LOW COMPLEXITY 4 6/6/2022 Nicole Olson PT, DPT, NCS GP 1            PT G-Codes  Outcome Measure Options: AM-PAC 6 Clicks Basic Mobility (PT)  AM-PAC 6 Clicks Score (PT): 6       Nicole Olson PT, DPT, CHULA  6/6/2022

## 2022-06-06 NOTE — PLAN OF CARE
Goal Outcome Evaluation:  Plan of Care Reviewed With: patient        Progress: no change  Outcome Evaluation: Pt A&Ox4, VSS. Pt reports pain in R hip that is relieved from PRN Tylenol. Dressing to R hip CDI. Shea boots in place BLE. Turned q 2 hours. Horan catheter in place to BSD. Pt complained of nausea and abdominal pain this shift unrelieved by PIA Gonzalez MD contacted and orders received. Safety maintained, will continue to monitor.

## 2022-06-06 NOTE — CONSULTS
[facility]  INPATIENT WOUND CONSULTATION    Today's Date: 06/06/22    Patient Name: Tawny Shin  MRN: [id number]  CSN: [id number]  PCP: Leta Julian DO  Referring Provider:   Consulting Provider (From admission, onward)    Start Ordered     Status Ordering Provider    06/04/22 0405 06/04/22 0405  Wound / Ostomy  Care Provider Consult  Once        Specialty:  Wound Care  Provider:  Polly Senior APRN Acknowledged JACKSON, APRIL L         Attending Provider: Butch Chong MD  Length of Stay: 2    SUBJECTIVE   Chief Complaint: Wounds of lower extremities    HPI: Tawny Shin, a 66 y.o.female, presents with a past medical history of Sjogren's disease, myocardial infarction, coronary artery disease, COPD.  A full past medical history as listed below.  Patient has a history of gluteal muscle tear repair of right hip area.  Patient had breakdown of this area which became a chronic wound with difficulty healing and required flap surgery by Dr. Turner.  Surgical incision of flap surgery has become erythemic with drainage, swelling, and pain.  Patient was admitted on on 6/30 due to this issue.  CT scan showed large fluid collection along the right hip of the greater trochanteric area and subcutaneous tissues measuring 7.8 x 4.0 x 8.1 cm.  Dr. Mcintosh are best completed I&D of the right hip on 10/24/2021 after being admitted with bacteremia.  Patient has been followed by infectious disease since that time.  Dr. Turner was not on over the weekend and Dr. Salcido took patient to surgery for I&D.  Currently this wound has a wound VAC in place.    Inpatient wound care consulted due to wounds of lower extremities.  Patient has chronic wounds that are treated at [facility] wound care center.  Last visit was on 6/3 where wounds were debrided and Unna boots were applied.  According to chart review, patient has wound to the right lateral lower leg which is an unstageable pressure  injury and a stage 3 pressure injury of the right lateral superior lower leg.  Silvercel is applied to wounds Unna boots were applied.  Patient does struggle with edema of lower extremities.  There has been times that patient return back to wound care and Unna boots have fallen due to decrease in edema.  Unna boots are to be applied weekly at wound care and are changed as needed when swelling decreases or Unna boots are soiled.      Visit Dx:    ICD-10-CM ICD-9-CM   1. Abscess of right hip  L02.415 682.6     Patient Active Problem List   Diagnosis   • T12 compression fracture, initial encounter ([facility])   • Chronic embolism and thrombosis of unspecified deep veins of left lower extremity ([facility])   • Urinary tract infection without hematuria   • Acute bilateral thoracic back pain   • Chronic anticoagulation   • Hypokalemia   • Transaminitis   • Iron deficiency anemia   • Osteoporosis   • Bacteremia   • Epidural hematoma ([facility])   • Pleural effusion, left   • Functional neurological symptom disorder with weakness or paralysis   • Overweight (BMI 25.0-29.9)   • Rheumatoid arthritis involving multiple sites ([facility])   • (HFpEF) heart failure with preserved ejection fraction ([facility]), acute on chronic   • Venous insufficiency   • Coronary artery disease involving native coronary artery of native heart without angina pectoris   • Sick sinus syndrome ([facility])   • Essential hypertension   • Pressure injury of skin of heel   • Abscess, right hip   • Chronic pain   • Chronic indwelling Horan catheter   • Abscess of right hip   • Anemia of chronic disease       History:   Past Medical History:   Diagnosis Date   • Age-related osteoporosis with current pathological fracture 5/27/2020   • Arthritis    • Asthma    • Bilateral bunions 12/23/2020   • Cancer ([facility])    • Cardiac pacemaker syndrome 12/23/2020    Overview:  - heart block - implanted 11/16   • Charcot's joint of foot, left 12/23/2020   • Chronic deep vein thrombosis (DVT) of right lower  "extremity ([facility]) 6/23/2021   • Chronic pain syndrome 6/22/2021   • Chronic sinusitis    • COPD (chronic obstructive pulmonary disease) ([facility])    • Coronary artery disease    • 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   • Elevated cholesterol    • Eustachian tube dysfunction    • Heart disease    • Herpes simplex    • History of transfusion    • Hyperlipidemia    • Hypertension    • Hypothyroidism 12/23/2020   • Intrinsic asthma 12/23/2020   • Knee dislocation    • Labral tear of right hip joint    • Laryngitis sicca    • Laryngitis, chronic    • Left carotid bruit 3/9/2016   • MI (myocardial infarction) ([facility])    • Myalgia due to statin 6/25/2019   • Open wound of right hip 9/14/2021   • Osteomyelitis of right femur ([facility]) 7/6/2021   • Otorrhea    • Pacemaker 11/17/2016   • Primary osteoarthritis of left knee 12/23/2020   • Psoriasis vulgaris 12/23/2020   • S/P coronary artery stent placement 3/9/2016   • Sensorineural hearing loss    • Seropositive rheumatoid arthritis of multiple sites ([facility]) 12/27/2019    Overview:  -myochrysine '93-'96 -methotrexate '96--->11/98;r/s  restarted 2/99--> 8/14 (anemia) -sulfasalazine- not effective -penicillamine 6/98-->10/98; no effect -leflunomide 11/98--> - Humira '13-->didn't take - Enbrel 12/14-->3/15- no effect!   Last Assessment & Plan:  - \"aching all over\" because she had to be off her anti-rheumatic drugs for 2 weeks in preparation for her R knee surgery - he   • Sick sinus syndrome ([facility]) 12/27/2019   • Sjogren's disease ([facility])    • Spondylolisthesis of lumbar region 1/17/2018   • Syncope, recurrent 2/8/2021     Past Surgical History:   Procedure Laterality Date   • A-V CARDIAC PACEMAKER INSERTION  2016   • ATRIAL CARDIAC PACEMAKER INSERTION     • CARDIAC CATHETERIZATION     • CATARACT EXTRACTION     • CERVICAL CORPECTOMY N/A 3/3/2021    Procedure: CERVICAL 6 CORPECTOMY WITH TITANIUM CAGE WITH NEURO MONITORING;  Surgeon: Bandar Shea MD;  Location: [facility] OR;  " Service: Neurosurgery;  Laterality: N/A;   • COLONOSCOPY  11/08/2011    One fold in the ascending colon which showed ulcer otherwise normal exam   • COLONOSCOPY  11/12/2004    Normal exam repeat in five years   • CORONARY ANGIOPLASTY WITH STENT PLACEMENT      X 2; 2013 & 2014   • ENDOSCOPY  07/10/2014    Normal exam   • FLAP LEG Right 9/14/2021    Procedure: RIGHT GLUTEAL FASCIOCUTANEOUS ADVANCEMENT FLAP AND RIGHT TENSOR FASCIAL JESSICA FLAP;  Surgeon: Amadeo Turner MD;  Location:  [facility] OR;  Service: Plastics;  Laterality: Right;   • HIP ABDUCTION TENOTOMY BILATERAL Right 1/14/2021    Procedure: RIGHT HIP GLUTEUS MEDLUS / MINIMUS REPAIR, POSSIBLE ACHILLES ALLOGRAFT;  Surgeon: Nino Carlson MD;  Location:  [facility] OR;  Service: Orthopedics;  Laterality: Right;   • INCISION AND DRAINAGE ABSCESS Right 6/4/2022    Procedure: INCISION AND DRAINAGE ABSCESS right hip;  Surgeon: Magda Salcido MD;  Location:  [facility] OR;  Service: General;  Laterality: Right;   • INCISION AND DRAINAGE HIP Right 2/9/2021    Procedure: HIP INCISION AND DRAINAGE;  Surgeon: Nino Carlson MD;  Location:  [facility] OR;  Service: Orthopedics;  Laterality: Right;   • INCISION AND DRAINAGE LEG Right 10/24/2021    Procedure: INCISION AND DRAINAGE LOWER EXTREMITY;  Surgeon: Amadeo Turner MD;  Location:  [facility] OR;  Service: Plastics;  Laterality: Right;   • INCISION AND DRAINAGE OF WOUND Right 7/8/2021    Procedure: INCISION AND DRAINAGE WOUND RIGHT HIP;  Surgeon: James Huntley MD;  Location:  [facility] OR;  Service: Orthopedics;  Laterality: Right;   • JOINT REPLACEMENT     • KYPHOPLASTY WITH BIOPSY Bilateral 10/26/2021    Procedure: OARCIC 12 KYPHOPLASTY WITH BIOPSY;  Surgeon: Bandar Shea MD;  Location:  [facility] OR;  Service: Neurosurgery;  Laterality: Bilateral;   • LEG DEBRIDEMENT Right 9/14/2021    Procedure: DEBRIDEMENT OF RIGHT HIP WOUND, RIGHT GLUTEAL FASCIOCUTANEOUS ADVANCEMENT FLAP AND RIGHT TENSOR FASCIAL JESSICA FLAP;   Surgeon: Amadeo Turner MD;  Location:  [facility] OR;  Service: Plastics;  Laterality: Right;   • LUMBAR DISCECTOMY Right 3/23/2021    Procedure: LUMBAR DISCECTOMY MICRO, Lumbar 1/2 right;  Surgeon: Bandar Shea MD;  Location:  [facility] OR;  Service: Neurosurgery;  Laterality: Right;   • LUMBAR FUSION N/A 1/19/2018    Procedure: L3-4,L4-5 DECOMPRESSION, POSTERIOR SPINAL FUSION WITH INSTRUMENTATION;  Surgeon: Fortino Oropeza MD;  Location:  [facility] OR;  Service:    • LUMBAR LAMINECTOMY WITH FUSION Left 1/17/2018    Procedure: LEFT L3-4 L4-5 LATERAL LUMBAR INTERBODY FUSION;  Surgeon: Fortino Oropeza MD;  Location:  [facility] OR;  Service:    • MYRINGOTOMY W/ TUBES  09/04/2014    TUBES NO LONGER IN PLACE   • OTHER SURGICAL HISTORY      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 so hardware was removed and spacers were placed   • REPLACEMENT TOTAL KNEE Right      Social History     Socioeconomic History   • Marital status:    Tobacco Use   • Smoking status: Never Smoker   • Smokeless tobacco: Never Used   Vaping Use   • Vaping Use: Never used   Substance and Sexual Activity   • Alcohol use: No   • Drug use: Never   • Sexual activity: Defer     Family History   Problem Relation Age of Onset   • Cancer Mother    • Heart disease Father        Allergies:  Allergies   Allergen Reactions   • Atorvastatin Other (See Comments)     LEG CRAMPS     • Amoxicillin Rash   • Escitalopram Rash   • Nabumetone Rash   • Niacin Er Rash   • Penicillin G Rash   • Penicillins Rash   • Simvastatin Rash       Medications:    Current Facility-Administered Medications:   •  acetaminophen (TYLENOL) tablet 650 mg, 650 mg, Oral, Q6H PRN, Magda Salcido MD, 650 mg at 06/06/22 1002  •  ascorbic acid (VITAMIN C) tablet 500 mg, 500 mg, Oral, Daily, Magda Salcido MD, 500 mg at 06/06/22 0848  •  aspirin EC tablet 81 mg, 81 mg, Oral, Daily, Magda Salcido MD, 81 mg at 06/06/22 0848  •  carvedilol (COREG) tablet 25 mg, 25 mg, Oral, BID With  Meals, Magda Salcido MD, 25 mg at 06/06/22 0848  •  cefepime (MAXIPIME) 2 g/100 mL 0.9% NS (mbp), 2 g, Intravenous, Q8H, Magda Salcido MD, 2 g at 06/06/22 1057  •  collagenase ointment 1 application, 1 application, Topical, Q24H, Magda Salcido MD, 1 application at 06/05/22 0851  •  Diclofenac Sodium (VOLTAREN) 1 % gel 4 g, 4 g, Topical, 4x Daily PRN, Magda Salcido MD  •  docusate sodium (COLACE) capsule 100 mg, 100 mg, Oral, BID, Magda Salcido MD, 100 mg at 06/06/22 0848  •  droperidol (INAPSINE) injection 1.25 mg, 1.25 mg, Intravenous, Q6H PRN, Senthil Paul MD, 1.25 mg at 06/06/22 0415  •  DULoxetine (CYMBALTA) DR capsule 60 mg, 60 mg, Oral, Daily, Magda Salcido MD, 60 mg at 06/06/22 0848  •  famotidine (PEPCID) tablet 40 mg, 40 mg, Oral, Daily, Magda Salcido MD, 40 mg at 06/06/22 0847  •  ferrous gluconate tablet 324 mg, 324 mg, Oral, Daily With Breakfast, Magda Salcido MD, 324 mg at 06/06/22 0848  •  fluticasone (FLONASE) 50 MCG/ACT nasal spray 1 spray, 1 spray, Nasal, Daily, Magda Salcido MD, 1 spray at 06/06/22 0846  •  furosemide (LASIX) tablet 40 mg, 40 mg, Oral, Daily, Magda Salcido MD, 40 mg at 06/06/22 0847  •  HYDROcodone-acetaminophen (NORCO) 7.5-325 MG per tablet 1 tablet, 1 tablet, Oral, Q8H PRN, Magda Salcido MD  •  hydrocortisone-bacitracin-zinc oxide-nystatin (MAGIC BARRIER) ointment 1 application, 1 application, Topical, PRN, Magda Salcido MD  •  isosorbide mononitrate (IMDUR) 24 hr tablet 60 mg, 60 mg, Oral, Daily, Magda Salcido MD, 60 mg at 06/06/22 0847  •  lactated ringers infusion, 50 mL/hr, Intravenous, Continuous, Magda Salcido MD, Last Rate: 50 mL/hr at 06/05/22 2020, 50 mL/hr at 06/05/22 2020  •  levothyroxine (SYNTHROID, LEVOTHROID) tablet 75 mcg, 75 mcg, Oral, Q AM, Magda Salcido MD, 75 mcg at 06/06/22 0631  •  lidocaine (LIDODERM) 5 % 1 patch, 1 patch, Transdermal, Q24H, Magda Salcido MD, 1 patch at 06/06/22 0845  •   magnesium hydroxide (MILK OF MAGNESIA) suspension 10 mL, 10 mL, Oral, Daily PRN, Magda Salcido MD, 10 mL at 06/05/22 1832  •  magnesium oxide (MAG-OX) tablet 400 mg, 400 mg, Oral, Daily, Magda Salcido MD, 400 mg at 06/06/22 0848  •  Menthol-Zinc Oxide, , Topical, BID, Magda Salcido MD  •  methylnaltrexone (RELISTOR) injection 12 mg, 12 mg, Subcutaneous, Every Other Day, Magda Salcido MD, 12 mg at 06/06/22 0846  •  multivitamin with minerals 1 tablet, 1 tablet, Oral, Daily, Magda Salcido MD, 1 tablet at 06/06/22 0848  •  nitroglycerin (NITROSTAT) SL tablet 0.4 mg, 0.4 mg, Sublingual, Q5 Min PRN, Magda Salcido MD  •  ondansetron (ZOFRAN) tablet 4 mg, 4 mg, Oral, Q6H PRN **OR** ondansetron (ZOFRAN) injection 4 mg, 4 mg, Intravenous, Q6H PRN, Magda Salcido MD, 4 mg at 06/06/22 1057  •  polyethylene glycol (MIRALAX) packet 17 g, 17 g, Oral, Daily, Magad Salcido MD, 17 g at 06/06/22 0845  •  saccharomyces boulardii (FLORASTOR) capsule 250 mg, 250 mg, Oral, Daily, Magda Salcido MD, 250 mg at 06/06/22 0847  •  sennosides-docusate (PERICOLACE) 8.6-50 MG per tablet 2 tablet, 2 tablet, Oral, BID, Magda Salcido MD, 2 tablet at 06/06/22 0848  •  [COMPLETED] Insert peripheral IV, , , Once **AND** sodium chloride 0.9 % flush 10 mL, 10 mL, Intravenous, PRN, Magda Salcido MD  •  sodium chloride 0.9 % flush 10 mL, 10 mL, Intravenous, Q12H, Magda Salcido MD, 10 mL at 06/06/22 0913  •  sodium chloride 0.9 % flush 10 mL, 10 mL, Intravenous, PRN, Magda Salcido MD  •  sucralfate (CARAFATE) tablet 1 g, 1 g, Oral, 4x Daily AC & at Bedtime, Magda Salcido MD, 1 g at 06/06/22 1057  •  thiamine (VITAMIN B-1) tablet 100 mg, 100 mg, Oral, Daily, Magda Salcido MD, 100 mg at 06/06/22 0848  •  traMADol (ULTRAM) tablet 100 mg, 100 mg, Oral, TID PRN, Magda Salcido MD  •  valACYclovir (VALTREX) tablet 500 mg, 500 mg, Oral, Daily, Magda Salcido MD, 500 mg at 06/06/22 0848    Review of  Systems:   Review of Systems   Constitutional: Positive for fatigue. Negative for chills.   HENT: Negative for rhinorrhea and sore throat.    Respiratory: Negative for cough and shortness of breath.    Cardiovascular: Positive for leg swelling. Negative for chest pain and palpitations.   Gastrointestinal: Negative for diarrhea, nausea and vomiting.   Genitourinary: Negative for frequency and urgency.   Musculoskeletal: Positive for gait problem and myalgias.   Skin: Positive for color change and wound.   Neurological: Positive for weakness. Negative for dizziness.   Psychiatric/Behavioral: Negative for agitation, behavioral problems and confusion.         OBJECTIVE     Vitals:    06/06/22 1146   BP: 165/70   Pulse: 60   Resp: 16   Temp: 98 °F (36.7 °C)   SpO2: 97%       PHYSICAL EXAM  Physical Exam  Physical Exam  Vitals and nursing note reviewed.   Constitutional:       General: She is awake.   HENT:      Head: Normocephalic and atraumatic.   Eyes:      General: Lids are normal. Gaze aligned appropriately. No visual field deficit.  Cardiovascular:      Rate and Rhythm: Normal rate and regular rhythm.   Pulmonary:      Effort: Pulmonary effort is normal. No respiratory distress.   Abdominal:      General: There is no distension.      Palpations: Abdomen is soft.   Genitourinary:     Comments: Urethral catheter in place  Musculoskeletal:      Cervical back: Normal range of motion and neck supple.      Right lower leg: No edema.      Left lower leg: No edema.      Right foot: Decreased range of motion. Swelling present.      Left foot: Decreased range of motion. Swelling present.      Comments: Trace edema of bilateral feet   Feet:      Right foot:      Skin integrity: Skin integrity normal. No skin breakdown or dry skin.      Left foot:      Skin integrity: Skin integrity normal. No skin breakdown or dry skin.   Skin:     General: Skin is warm and dry.      Findings: Erythema and wound present.      Comments: Unna  boots currently in place to bilateral lower extremities.  Reviewed notes from wound care center which reports pressure injury of right lateral distal leg.    Neurological:      Mental Status: She is alert and oriented.      Motor: Weakness present. No pronator drift.      Gait: Gait abnormal.   Psychiatric:         Attention and Perception: Attention normal.         Mood and Affect: Mood normal.                Results Review:  Lab Results (last 48 hours)     Procedure Component Value Units Date/Time    Urine Culture - Urine, Urine, Clean Catch [[id number]]  (Abnormal)  (Susceptibility) Collected: 06/04/22 0130    Specimen: Urine, Clean Catch Updated: 06/06/22 1057     Urine Culture >100,000 CFU/mL Pseudomonas aeruginosa      >100,000 CFU/mL Escherichia coli    Narrative:      Colonization of the urinary tract without infection is common. Treatment is discouraged unless the patient is symptomatic, pregnant, or undergoing an invasive urologic procedure.    Susceptibility      Pseudomonas aeruginosa      LICHA      Cefepime Susceptible      Ceftazidime Susceptible      Ciprofloxacin Susceptible      Gentamicin Susceptible      Levofloxacin Susceptible      Piperacillin + Tazobactam Susceptible                    Linear View               Susceptibility      Escherichia coli      LICHA      Ampicillin Resistant     Ampicillin + Sulbactam Susceptible      Cefazolin Susceptible      Cefepime Susceptible      Ceftazidime Susceptible      Ceftriaxone Susceptible      Gentamicin Resistant     Levofloxacin Resistant     Nitrofurantoin Susceptible      Piperacillin + Tazobactam Susceptible      Tobramycin Susceptible      Trimethoprim + Sulfamethoxazole Resistant                   Linear View                   Tissue / Bone Culture - Tissue, Hip, Right [[id number]]  (Abnormal) Collected: 06/04/22 0834    Specimen: Tissue from Hip, Right Updated: 06/06/22 0942     Tissue Culture Light growth (2+) Staphylococcus aureus     Gram Stain  Many (4+) WBCs seen      Few (2+) Gram positive cocci    Narrative:      Refer to previous blood culture collected on 6/4/2022 0428 for MICs      Wound Culture - Wound, Hip, Right [[id number]]  (Abnormal) Collected: 06/04/22 0834    Specimen: Wound from Hip, Right Updated: 06/06/22 0942     Wound Culture Scant growth (1+) Staphylococcus aureus     Gram Stain Moderate (3+) WBCs seen      Few (2+) Gram positive cocci    Narrative:      Refer to previous blood culture collected on 6/4/2022 0428 for MICs    Wound Culture - Wound, Hip [[id number]]  (Abnormal)  (Susceptibility) Collected: 06/04/22 0328    Specimen: Wound from Hip Updated: 06/06/22 0941     Wound Culture Light growth (2+) Staphylococcus aureus     Gram Stain Moderate (3+) WBCs seen      Few (2+) Gram positive cocci, intracellular and extracellular    Susceptibility      Staphylococcus aureus      LICHA      Clindamycin Resistant     Erythromycin Resistant     Inducible Clindamycin Resistance Negative      Oxacillin Susceptible      Rifampin Susceptible      Tetracycline Resistant     Trimethoprim + Sulfamethoxazole Susceptible      Vancomycin Susceptible                    Linear View                   Comprehensive Metabolic Panel [[id number]]  (Abnormal) Collected: 06/06/22 0658    Specimen: Blood Updated: 06/06/22 0738     Glucose 126 mg/dL      BUN 13 mg/dL      Creatinine 0.72 mg/dL      Sodium 135 mmol/L      Potassium 4.2 mmol/L      Chloride 101 mmol/L      CO2 31.0 mmol/L      Calcium 8.6 mg/dL      Total Protein 7.3 g/dL      Albumin 2.60 g/dL      ALT (SGPT) 12 U/L      AST (SGOT) 18 U/L      Alkaline Phosphatase 110 U/L      Total Bilirubin 0.2 mg/dL      Globulin 4.7 gm/dL      A/G Ratio 0.6 g/dL      BUN/Creatinine Ratio 18.1     Anion Gap 3.0 mmol/L      eGFR 91.2 mL/min/1.73      Comment: National Kidney Foundation and American Society of Nephrology (ASN) Task Force recommended calculation based on the Chronic Kidney Disease Epidemiology  Collaboration (CKD-EPI) equation refit without adjustment for race.       Narrative:      GFR Normal >60  Chronic Kidney Disease <60  Kidney Failure <15      CBC (No Diff) [[id number]]  (Abnormal) Collected: 06/06/22 0658    Specimen: Blood Updated: 06/06/22 0724     WBC 4.74 10*3/mm3      RBC 3.26 10*6/mm3      Hemoglobin 8.6 g/dL      Hematocrit 28.9 %      MCV 88.7 fL      MCH 26.4 pg      MCHC 29.8 g/dL      RDW 15.8 %      RDW-SD 51.6 fl      MPV 9.1 fL      Platelets 220 10*3/mm3     Blood Culture - Blood, Arm, Right [[id number]]  (Normal) Collected: 06/03/22 2229    Specimen: Blood from Arm, Right Updated: 06/05/22 2246     Blood Culture No growth at 2 days    Blood Culture - Blood, Arm, Right [[id number]]  (Normal) Collected: 06/03/22 2229    Specimen: Blood from Arm, Right Updated: 06/05/22 2245     Blood Culture No growth at 2 days    Vancomycin, Trough Please collect 30-60 minutes prior to dose due 06/05/22 at 1700 [[id number]]  (Abnormal) Collected: 06/05/22 1604    Specimen: Blood Updated: 06/05/22 1628     Vancomycin Trough 21.30 mcg/mL         Imaging Results (Last 72 Hours)     Procedure Component Value Units Date/Time    CT Lower Extremity Right Without Contrast [[id number]] Collected: 06/04/22 1005     Updated: 06/04/22 1019    Narrative:      CT LOWER EXTREMITY RIGHT WO CONTRAST- 6/3/2022 10:10 PM CDT     HISTORY: pain and erythema in proximal femur at previous surgery site      COMPARISON: 10/22/2021     DOSE LENGTH PRODUCT: 493 mGy cm. Automated exposure control was also  utilized to decrease patient radiation dose.     TECHNIQUE: Axial images of the right hip are obtained without contrast.  Sagittal coronal images reformatted     FINDINGS:  There is a 9.3 x 7.1 x 4.5 cm ill-defined collection within  the lateral soft tissues of the right hip/proximal thigh with Hounsfield  units measuring in the 40s. There is stranding of the adjacent  subcutaneous fat with overlying lateral skin thickening. A  well-defined  walled off fluid collection is not localized. The edema extends into the  anterior lateral soft tissues of the right thigh.     Injection granuloma suspected within the subcutaneous tissues of the  right buttocks. There is advanced osteopenia. A lucency of the greater  trochanter may represent prior site of hardware removal. These  osteophytes of the greater trochanter. No radiographic evidence of  osteomyelitis. No acute regional bony pathology.     Horan catheter within the bladder. Diffuse vascular calcification.  Reactive right inguinal lymph nodes. Coarse calcifications uterus  suggesting small calcified leiomyoma.       Impression:      1. There is a isointense ill-defined collection within the lateral soft  tissues of the right hip with measurements provided above. Given the  Hounsfield units measuring in the 40s, inflammatory phlegmon favored  over fluid containing abscess collection. Extensive edema/inflammation  of the subcutaneous tissues with overlying skin thickening.  2. Osteopenia with chronic changes of the right hip. No acute osseous  pathology.     Comments: A preliminary report is issued to the ER by the [facility]  radiology service. This preliminary report describes similar findings as  above but suggest surrounding cellulitis and possible abscess. Again,  given the Hounsfield unit measurement of 40s and the lack of a  well-defined wall-phlegmon and/or old hematoma favored over simple  abscess collection.  This report was finalized on 06/04/2022 10:16 by Dr. Trinity Gómez MD.    XR Chest 1 View [[id number]] Collected: 06/04/22 0736     Updated: 06/04/22 0740    Narrative:      HISTORY: Sepsis     CXR: Frontal view the chest obtained     COMPARISON: 5/19/2022     FINDINGS: Stable cardiomegaly. Similar positioning of left subclavian  cardiac pacer leads. Prominent pulmonary vascular structures and  interstitial markings. Mild edema considered. No dense lobar  consolidation. Possible  trace left pleural effusion. The right upper  lobe granuloma. No pneumothorax. Postoperative changes cervical spine.  L1 kyphoplasty change.       Impression:      1. Stable cardiomegaly with mild CHF type changes considered.  Questionable trace left pleural effusion. No dense lobar consolidation.  Left subclavian cardiac pacer device.  This report was finalized on 06/04/2022 07:37 by Dr. Trinity Gómez MD.             ASSESSMENT/PLAN       Examination and evaluation of wound(s) was performed.    DIAGNOSIS:   Pressure injury of right lower leg, stage 3  Impaired mobility  Abscess, right hip  Chronic embolism and thrombosis of unspecified deep veins of left lower extremity  Rheumatoid arthritis involving multiple sites    PLAN:   We will follow patient and assess tomorrow to see if Unna boots are in need of changing.  Unna boots are to be changed at least every 7 days with next change scheduled for 6/10.  If Unna boots become loose or soiled, or Unna boots are to be changed at that time.    Physical therapy will follow for Unna boot management as well.  [employer] to wounds of RLE prior to unnaboot application.      Start     Ordered    06/06/22 1355  Elevate Extremity  Continuous        Comments: Elevate bilateral lower extremities above heart level when possible to decrease edema of lower extremities   Question:  Elevate Extremity  Answer:  BLE    06/06/22 3817            Discussed findings and treatment plan including risks, benefits, and treatment options with patient in detail. Patient agreed with treatment plan.      This document has been electronically signed by DANIELA Mullins on 6/6/2022 13:45 CDT

## 2022-06-06 NOTE — PROGRESS NOTES
[facility] Medicine Services  INPATIENT PROGRESS NOTE    Patient Name: Tawny Shin  Date of Admission: 6/3/2022  Today's Date: 06/06/22  Length of Stay: 2  Primary Care Physician: Leta Julian DO    Subjective   Chief Complaint: Nausea  HPI   Patient's primary symptom today is nausea.  She reports having 1 episode of vomiting earlier today.  She states that she received a dose of nausea medicine and her symptoms dissipated.  Her symptoms have recurred some this afternoon, and she reports that she was able to eat a very limited lunch secondary to nausea.  She states that it has been about a week since she has had a bowel movement.  She also reports that it is not uncommon for her to go about a week between bowel movements.  A wound VAC was placed earlier today.  Denies any pain at this time.    Review of Systems     All pertinent negatives and positives are as above. All other systems have been reviewed and are negative unless otherwise stated.     Objective    Temp:  [98 °F (36.7 °C)-98.6 °F (37 °C)] 98 °F (36.7 °C)  Heart Rate:  [60-81] 60  Resp:  [16-18] 16  BP: (113-174)/(56-87) 165/70  Physical Exam  Vitals reviewed.   Constitutional:       Appearance: She is ill-appearing.   HENT:      Head: Normocephalic.      Mouth/Throat:      Pharynx: No oropharyngeal exudate.   Eyes:      Pupils: Pupils are equal, round, and reactive to light.   Pulmonary:      Effort: Pulmonary effort is normal. No respiratory distress.   Abdominal:      General: Bowel sounds are normal.      Palpations: Abdomen is soft.      Tenderness: There is no abdominal tenderness.   Genitourinary:     Comments: Chronic kay; normal appearing urine  Musculoskeletal:      Cervical back: Neck supple.      Comments: Wound vac in place right hip   Neurological:      Mental Status: She is alert.      Motor: Weakness present.   Psychiatric:         Mood and Affect: Mood normal.         Results Review:  I  have reviewed the labs, radiology results, and diagnostic studies.    Laboratory Data:   Results from last 7 days   Lab Units 06/06/22  0658 06/04/22  1111 06/03/22  2229   WBC 10*3/mm3 4.74 5.21 7.76   HEMOGLOBIN g/dL 8.6* 7.9* 8.4*   HEMATOCRIT % 28.9* 26.3* 27.1*   PLATELETS 10*3/mm3 220 222 241        Results from last 7 days   Lab Units 06/06/22  0658 06/04/22  1111 06/03/22  2229   SODIUM mmol/L 135* 138 138   POTASSIUM mmol/L 4.2 3.7 3.7   CHLORIDE mmol/L 101 102 101   CO2 mmol/L 31.0* 27.0 28.0   BUN mg/dL 13 15 18   CREATININE mg/dL 0.72 0.80 0.84   CALCIUM mg/dL 8.6 8.5* 8.4*   BILIRUBIN mg/dL 0.2 0.2 0.2   ALK PHOS U/L 110 114 129*   ALT (SGPT) U/L 12 11 13   AST (SGOT) U/L 18 16 18   GLUCOSE mg/dL 126* 139* 113*       Culture Data:   Blood Culture   Date Value Ref Range Status   06/03/2022 No growth at 2 days  Preliminary   06/03/2022 No growth at 2 days  Preliminary     Urine Culture   Date Value Ref Range Status   06/04/2022 >100,000 CFU/mL Pseudomonas aeruginosa (A)  Final   06/04/2022 >100,000 CFU/mL Escherichia coli (A)  Final     Wound Culture   Date Value Ref Range Status   06/04/2022 Scant growth (1+) Staphylococcus aureus (A)  Final   06/04/2022 Light growth (2+) Staphylococcus aureus (A)  Final       Radiology Data:   Imaging Results (Last 24 Hours)     ** No results found for the last 24 hours. **          I have reviewed the patient's current medications.     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  Wound culture as follows:    2.  Agree with d/c Vancomycin; continue Cefepime as not only should this cover MSSA, but will also cover the Pseudomonas and E. coli on her urine culture (see below):      3.  Wound vac placed today  4.  Patient on p.o. Lasix and Imdur.  Blood pressure trend noted.  I am going to start a low-dose of ACE inhibitor and monitor BP trend  5.  Patient also on Eliquis as outpatient; will discuss with general surgery regarding timing to resume  6.  On ASA 81mg daily  7.  Wound care  8.  Bowel regimen -patient reports that it has been about a week since she last had a bowel movement; she also reports that is not uncommon for her  9.  If she continues to have ongoing symptoms of nausea or vomiting, will check KUB.  10.  IV Zofran PRN        Electronically signed by Butch Chong MD, 06/06/22, 13:45 CDT.

## 2022-06-06 NOTE — CASE MANAGEMENT/SOCIAL WORK
Pt is a private bed hold at [facility] facility. Pt will be able to return there upon d/c. Will follow. [phone]

## 2022-06-06 NOTE — PLAN OF CARE
Goal Outcome Evaluation:  Plan of Care Reviewed With: patient            Patient alert and oriented x 4. VSS. RA. Denies pain . States pain at a times at right hip. Denies taking pain medication. Intermittent nasuea and 1 x emesis. Wound vac placed today. Wound site dressing clean,  dry and intact. Reposition Q2 hrs. Horan cath care completed.BLLE swollen, chris boot present. NO BM. Denies to take suppository or enema. Other scheduled and PRN med offered. Waiting for outcome. Safety precautions maintained. Continue to monitor.

## 2022-06-06 NOTE — PLAN OF CARE
Goal Outcome Evaluation:  Plan of Care Reviewed With: patient        Progress: no change  Outcome Evaluation: The patient presents alert and oriented x4 with complaints of nausea. 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 She uses a lift to get into and out of bed and is wheelchair bound. She does demonstrates improved LE strength since the last time I saw her. Her L LE is strong enough to assist with functional movement. PT will continue to work with her to work on bed mobility, and LE strengthening.

## 2022-06-06 NOTE — PROGRESS NOTES
"INFECTIOUS DISEASES PROGRESS NOTE    Patient:  Tawny Shin  YOB: 1955  MRN: [id number]   Admit date: 6/3/2022   Admitting Physician: Butch Chong MD  Primary Care Physician: Leta Julian DO    Chief Complaint: \"Doing all right\"        Interval History: Patient status post evacuation of large cavity right hip with a small amount of the wall that had necrotic tissue per Dr. Magda Salcido June 4, 2022.    Patient notes she is likely getting a wound VAC placed today    Cultures thus far from the hip positive for Staph aureus-patient has had history of Staph aureus bacteremia and infections involving that hip.        Allergies:   Allergies   Allergen Reactions   • Atorvastatin Other (See Comments)     LEG CRAMPS     • Amoxicillin Rash   • Escitalopram Rash   • Nabumetone Rash   • Niacin Er Rash   • Penicillin G Rash   • Penicillins Rash   • Simvastatin Rash       Current Scheduled Medications:   ascorbic acid, 500 mg, Oral, Daily  aspirin, 81 mg, Oral, Daily  carvedilol, 25 mg, Oral, BID With Meals  cefepime, 2 g, Intravenous, Q8H  collagenase, 1 application, Topical, Q24H  docusate sodium, 100 mg, Oral, BID  DULoxetine, 60 mg, Oral, Daily  famotidine, 40 mg, Oral, Daily  ferrous gluconate, 324 mg, Oral, Daily With Breakfast  fluticasone, 1 spray, Nasal, Daily  furosemide, 40 mg, Oral, Daily  isosorbide mononitrate, 60 mg, Oral, Daily  levothyroxine, 75 mcg, Oral, Q AM  lidocaine, 1 patch, Transdermal, Q24H  magnesium oxide, 400 mg, Oral, Daily  Menthol-Zinc Oxide, , Topical, BID  methylnaltrexone, 12 mg, Subcutaneous, Every Other Day  multivitamin with minerals, 1 tablet, Oral, Daily  polyethylene glycol, 17 g, Oral, Daily  saccharomyces boulardii, 250 mg, Oral, Daily  sennosides-docusate, 2 tablet, Oral, BID  sodium chloride, 10 mL, Intravenous, Q12H  sucralfate, 1 g, Oral, 4x Daily AC & at Bedtime  thiamine, 100 mg, Oral, Daily  valACYclovir, 500 mg, Oral, Daily  vancomycin, 1,250 " "mg, Intravenous, Q24H      Current PRN Medications:  •  acetaminophen  •  Diclofenac Sodium  •  droperidol  •  HYDROcodone-acetaminophen  •  hydrocortisone-bacitracin-zinc oxide-nystatin  •  magnesium hydroxide  •  nitroglycerin  •  ondansetron **OR** ondansetron  •  [COMPLETED] Insert peripheral IV **AND** sodium chloride  •  sodium chloride  •  traMADol    Review of Systems  General: No fever  Right hip wound with some drainage.    Objective     Vital Signs:  Temp (24hrs), Av.3 °F (36.8 °C), Min:98 °F (36.7 °C), Max:98.6 °F (37 °C)      /56 (BP Location: Right arm, Patient Position: Lying)   Pulse 61   Temp 98.2 °F (36.8 °C) (Oral)   Resp 18   Ht 167.6 cm (66\")   Wt 81.1 kg (178 lb 12.7 oz)   LMP  (LMP Unknown)   SpO2 97%   BMI 28.86 kg/m²         Physical Exam   General: The patient is nontoxic-appearing sitting up in bed brushing her teeth  HEENT: Sclera anicteric  Respiratory: Effort even and unlabored, she is not conversationally dyspneic  Right hip: Dressing in place.  It did not appear to have any drainage on it.  She did have some thin drainage just underneath the right hip on her gown.  Psych: Pleasant and cooperative      Results Review:    I reviewed the patient's new clinical results.    Lab Results:  CBC:   Lab Results   Lab 22  1111 22  0658   WBC 7.76 5.21 4.74   HEMOGLOBIN 8.4* 7.9* 8.6*   HEMATOCRIT 27.1* 26.3* 28.9*   PLATELETS 241 222 220         CMP:   Lab Results   Lab 22  1111 22  0658   SODIUM 138 138 135*   POTASSIUM 3.7 3.7 4.2   CHLORIDE 101 102 101   CO2 28.0 27.0 31.0*   BUN 18 15 13   CREATININE 0.84 0.80 0.72   CALCIUM 8.4* 8.5* 8.6   BILIRUBIN 0.2 0.2 0.2   ALK PHOS 129* 114 110   ALT (SGPT) 13 11 12   AST (SGOT) 18 16 18   GLUCOSE 113* 139* 126*       Estimated Creatinine Clearance: 80.3 mL/min (by C-G formula based on SCr of 0.72 mg/dL).    Culture Results:    Microbiology Results (last 10 days)     Procedure " Component Value - Date/Time    Wound Culture - Wound, Hip, Right [[id number]]  (Abnormal) Collected: 06/04/22 0834    Lab Status: Preliminary result Specimen: Wound from Hip, Right Updated: 06/05/22 1224     Wound Culture Scant growth (1+) Staphylococcus aureus     Gram Stain Moderate (3+) WBCs seen      Few (2+) Gram positive cocci    Tissue / Bone Culture - Tissue, Hip, Right [[id number]]  (Abnormal) Collected: 06/04/22 0834    Lab Status: Preliminary result Specimen: Tissue from Hip, Right Updated: 06/05/22 1225     Tissue Culture Light growth (2+) Staphylococcus aureus     Gram Stain Many (4+) WBCs seen      Few (2+) Gram positive cocci    Wound Culture - Wound, Hip [[id number]]  (Abnormal) Collected: 06/04/22 0328    Lab Status: Preliminary result Specimen: Wound from Hip Updated: 06/05/22 1223     Wound Culture Light growth (2+) Staphylococcus aureus     Gram Stain Moderate (3+) WBCs seen      Few (2+) Gram positive cocci, intracellular and extracellular    Urine Culture - Urine, Urine, Clean Catch [[id number]]  (Abnormal) Collected: 06/04/22 0130    Lab Status: Preliminary result Specimen: Urine, Clean Catch Updated: 06/05/22 0953     Urine Culture >100,000 CFU/mL Gram Negative Bacilli    Narrative:      Colonization of the urinary tract without infection is common. Treatment is discouraged unless the patient is symptomatic, pregnant, or undergoing an invasive urologic procedure.  Colonization of the urinary tract without infection is common. Treatment is discouraged unless the patient is symptomatic, pregnant, or undergoing an invasive urologic procedure.    COVID PRE-OP / PRE-PROCEDURE SCREENING ORDER (NO ISOLATION) - Swab, Nasal Cavity [[id number]]  (Normal) Collected: 06/04/22 0126    Lab Status: Final result Specimen: Swab from Nasal Cavity Updated: 06/04/22 0208    Narrative:      The following orders were created for panel order COVID PRE-OP / PRE-PROCEDURE SCREENING ORDER (NO ISOLATION) - Swab, Nasal  Cavity.  Procedure                               Abnormality         Status                     ---------                               -----------         ------                     COVID-19,[facility]...[[id number]]  Normal              Final result                 Please view results for these tests on the individual orders.    COVID-19,[facility] IN-HOUSE,Nasal Swab No Transport Media 3-4 HR TAT - Swab, Nasal Cavity [[id number]]  (Normal) Collected: 06/04/22 0126    Lab Status: Final result Specimen: Swab from Nasal Cavity Updated: 06/04/22 0208     COVID19 Not Detected    Narrative:      Fact sheet for providers: https://www.fda.gov/media/464009/download     Fact sheet for patients: https://www.fda.gov/media/184103/download    Test performed by PCR.    Consider negative results in combination with clinical observations, patient history, and epidemiological information.    Blood Culture - Blood, Arm, Right [[id number]]  (Normal) Collected: 06/03/22 2229    Lab Status: Preliminary result Specimen: Blood from Arm, Right Updated: 06/05/22 2246     Blood Culture No growth at 2 days    Blood Culture - Blood, Arm, Right [[id number]]  (Normal) Collected: 06/03/22 2229    Lab Status: Preliminary result Specimen: Blood from Arm, Right Updated: 06/05/22 2245     Blood Culture No growth at 2 days               Radiology:   Imaging Results (Last 72 Hours)     Procedure Component Value Units Date/Time    CT Lower Extremity Right Without Contrast [[id number]] Collected: 06/04/22 1005     Updated: 06/04/22 1019    Narrative:      CT LOWER EXTREMITY RIGHT WO CONTRAST- 6/3/2022 10:10 PM CDT     HISTORY: pain and erythema in proximal femur at previous surgery site      COMPARISON: 10/22/2021     DOSE LENGTH PRODUCT: 493 mGy cm. Automated exposure control was also  utilized to decrease patient radiation dose.     TECHNIQUE: Axial images of the right hip are obtained without contrast.  Sagittal coronal images reformatted      FINDINGS:  There is a 9.3 x 7.1 x 4.5 cm ill-defined collection within  the lateral soft tissues of the right hip/proximal thigh with Hounsfield  units measuring in the 40s. There is stranding of the adjacent  subcutaneous fat with overlying lateral skin thickening. A well-defined  walled off fluid collection is not localized. The edema extends into the  anterior lateral soft tissues of the right thigh.     Injection granuloma suspected within the subcutaneous tissues of the  right buttocks. There is advanced osteopenia. A lucency of the greater  trochanter may represent prior site of hardware removal. These  osteophytes of the greater trochanter. No radiographic evidence of  osteomyelitis. No acute regional bony pathology.     Horan catheter within the bladder. Diffuse vascular calcification.  Reactive right inguinal lymph nodes. Coarse calcifications uterus  suggesting small calcified leiomyoma.       Impression:      1. There is a isointense ill-defined collection within the lateral soft  tissues of the right hip with measurements provided above. Given the  Hounsfield units measuring in the 40s, inflammatory phlegmon favored  over fluid containing abscess collection. Extensive edema/inflammation  of the subcutaneous tissues with overlying skin thickening.  2. Osteopenia with chronic changes of the right hip. No acute osseous  pathology.     Comments: A preliminary report is issued to the ER by the [facility]  radiology service. This preliminary report describes similar findings as  above but suggest surrounding cellulitis and possible abscess. Again,  given the Hounsfield unit measurement of 40s and the lack of a  well-defined wall-phlegmon and/or old hematoma favored over simple  abscess collection.  This report was finalized on 06/04/2022 10:16 by Dr. Trinity Gómez MD.    XR Chest 1 View [[id number]] Collected: 06/04/22 0736     Updated: 06/04/22 0740    Narrative:      HISTORY: Sepsis     CXR: Frontal view the  chest obtained     COMPARISON: 5/19/2022     FINDINGS: Stable cardiomegaly. Similar positioning of left subclavian  cardiac pacer leads. Prominent pulmonary vascular structures and  interstitial markings. Mild edema considered. No dense lobar  consolidation. Possible trace left pleural effusion. The right upper  lobe granuloma. No pneumothorax. Postoperative changes cervical spine.  L1 kyphoplasty change.       Impression:      1. Stable cardiomegaly with mild CHF type changes considered.  Questionable trace left pleural effusion. No dense lobar consolidation.  Left subclavian cardiac pacer device.  This report was finalized on 06/04/2022 07:37 by Dr. Trinity Gómez MD.          Assessment & Plan     Active Hospital Problems    Diagnosis    • **Abscess, right hip    • Anemia of chronic disease    • Pressure injury of skin of heel    • Chronic pain    • Chronic indwelling Horan catheter    • Abscess of right hip      Added automatically from request for surgery [id number]     • Essential hypertension    • Rheumatoid arthritis involving multiple sites (HCC)    • Functional neurological symptom disorder with weakness or paralysis    • Chronic anticoagulation    • Chronic embolism and thrombosis of unspecified deep veins of left lower extremity (HCC)      · Abscess right hip with cultures positive for methicillin susceptible Staph aureus.  · Urinary tract infection with gram-negative bacilli-ID and susceptibilities pending    RECOMMENDATION:   · Discontinue vancomycin  · Continue cefepime-this should also cover methicillin susceptible Staph aureus  · Await identification of gram-negative rods in urine and hopefully will be able to narrow antibiotics further.  · Will check with Dr. Salcido regarding any concern for tracking to hip joint?        Maggie Bang MD  06/06/22  09:15 CDT

## 2022-06-06 NOTE — PROGRESS NOTES
Infectious Diseases Progress Note    Patient:  Tawny Shin  YOB: 1955  MRN: [id number]   Admit date: 6/3/2022   Admitting Physician: Moises Oliveira MD  Primary Care Physician: Leta Julian DO    Chief Complaint/Interval History: She feels okay.  No new symptoms.  No diarrhea or rash.  Reviewed culture results with her.  Explained operative sample growing Staph aureus.  Final susceptibility pending.    Intake/Output Summary (Last 24 hours) at 6/5/2022 1950  Last data filed at 6/5/2022 1817  Gross per 24 hour   Intake 1393 ml   Output 2200 ml   Net -807 ml     Allergies:   Allergies   Allergen Reactions   • Atorvastatin Other (See Comments)     LEG CRAMPS     • Amoxicillin Rash   • Escitalopram Rash   • Nabumetone Rash   • Niacin Er Rash   • Penicillin G Rash   • Penicillins Rash   • Simvastatin Rash     Current Scheduled Medications:   ascorbic acid, 500 mg, Oral, Daily  aspirin, 81 mg, Oral, Daily  carvedilol, 25 mg, Oral, BID With Meals  cefepime, 2 g, Intravenous, Q8H  collagenase, 1 application, Topical, Q24H  docusate sodium, 100 mg, Oral, BID  DULoxetine, 60 mg, Oral, Daily  famotidine, 40 mg, Oral, Daily  ferrous gluconate, 324 mg, Oral, Daily With Breakfast  fluticasone, 1 spray, Nasal, Daily  furosemide, 40 mg, Oral, Daily  isosorbide mononitrate, 60 mg, Oral, Daily  levothyroxine, 75 mcg, Oral, Q AM  lidocaine, 1 patch, Transdermal, Q24H  magnesium oxide, 400 mg, Oral, Daily  [START ON 6/6/2022] Menthol-Zinc Oxide, , Topical, BID  methylnaltrexone, 12 mg, Subcutaneous, Every Other Day  multivitamin with minerals, 1 tablet, Oral, Daily  polyethylene glycol, 17 g, Oral, Daily  saccharomyces boulardii, 250 mg, Oral, Daily  sennosides-docusate, 2 tablet, Oral, BID  sodium chloride, 10 mL, Intravenous, Q12H  sucralfate, 1 g, Oral, 4x Daily AC & at Bedtime  thiamine, 100 mg, Oral, Daily  valACYclovir, 500 mg, Oral, Daily  [START ON 6/6/2022] vancomycin, 1,250 mg, Intravenous,  "Q24H      Current PRN Medications:  •  acetaminophen  •  Diclofenac Sodium  •  HYDROcodone-acetaminophen  •  hydrocortisone-bacitracin-zinc oxide-nystatin  •  magnesium hydroxide  •  nitroglycerin  •  ondansetron **OR** ondansetron  •  [COMPLETED] Insert peripheral IV **AND** sodium chloride  •  sodium chloride  •  traMADol    Review of Systems see HPI    Vital Signs:  /60 (BP Location: Right arm, Patient Position: Lying)   Pulse 67   Temp 98.3 °F (36.8 °C) (Oral)   Resp 18   Ht 167.6 cm (66\")   Wt 81.1 kg (178 lb 12.7 oz)   LMP  (LMP Unknown)   SpO2 95%   BMI 28.86 kg/m²     Physical Exam  Vital signs - reviewed.  Line/IV site - No erythema, warmth, induration, or tenderness.  Right hip dressing clean and dry.  No surrounding erythema    Lab Results:  CBC:   Results from last 7 days   Lab Units 06/04/22  1111 06/03/22  2229   WBC 10*3/mm3 5.21 7.76   HEMOGLOBIN g/dL 7.9* 8.4*   HEMATOCRIT % 26.3* 27.1*   PLATELETS 10*3/mm3 222 241     BMP:  Results from last 7 days   Lab Units 06/04/22  1111 06/03/22  2229   SODIUM mmol/L 138 138   POTASSIUM mmol/L 3.7 3.7   CHLORIDE mmol/L 102 101   CO2 mmol/L 27.0 28.0   BUN mg/dL 15 18   CREATININE mg/dL 0.80 0.84   GLUCOSE mg/dL 139* 113*   CALCIUM mg/dL 8.5* 8.4*   ALT (SGPT) U/L 11 13     Culture Results:   Blood Culture   Date Value Ref Range Status   06/03/2022 No growth at 24 hours  Preliminary   06/03/2022 No growth at 24 hours  Preliminary     Urine Culture   Date Value Ref Range Status   06/04/2022 >100,000 CFU/mL Gram Negative Bacilli (A)  Preliminary     Wound Culture   Date Value Ref Range Status   06/04/2022 Scant growth (1+) Staphylococcus aureus (A)  Preliminary   06/04/2022 Light growth (2+) Staphylococcus aureus (A)  Preliminary     Radiology: None  Additional Studies Reviewed: None    Impression:   1.  Right hip abscess  2.  Positive urine culture for gram-negative rods  3.  Previous gluteal muscle tear with failed repair, debridement, and " eventual flap closure  4.  Previous history of staph aureus bacteremia  5.  History T12 compression fracture and epidural hematoma    Recommendations:   Continue vancomycin  Continue cefepime  Await susceptibility testing  Review operative findings with Dr. Salcido  Continue to follow    Elie Ohara MD

## 2022-06-06 NOTE — PLAN OF CARE
Goal Outcome Evaluation:  Plan of Care Reviewed With: patient        Progress: no change  Outcome Evaluation: The patient presents alert and oriented x4. She has a dressing on her R lateral thigh from recent I and D due to abscess. The dressing is saturated and saturated outside the dressing. The dressing was removed and a health wound base was seen. She does have a deep area straight down that does not need to be packed, but I did pack the opening to prevent it from closing to form another abscess. The dressing was placed and a good seal was achieved. PT to continue to follow for dressing changes MWF or as needed.

## 2022-06-06 NOTE — THERAPY EVALUATION
Patient Name: Tawny Shin  : 1955    MRN: [id number]                              Today's Date: 2022       Admit Date: 6/3/2022    Visit Dx:     ICD-10-CM ICD-9-CM   1. Abscess of right hip  L02.415 682.6   2. Impaired mobility  Z74.09 799.89     Patient Active Problem List   Diagnosis   • T12 compression fracture, initial encounter ([facility])   • Chronic embolism and thrombosis of unspecified deep veins of left lower extremity ([facility])   • Urinary tract infection without hematuria   • Acute bilateral thoracic back pain   • Chronic anticoagulation   • Hypokalemia   • Transaminitis   • Iron deficiency anemia   • Osteoporosis   • Bacteremia   • Epidural hematoma ([facility])   • Pleural effusion, left   • Functional neurological symptom disorder with weakness or paralysis   • Overweight (BMI 25.0-29.9)   • Rheumatoid arthritis involving multiple sites ([facility])   • (HFpEF) heart failure with preserved ejection fraction ([facility]), acute on chronic   • Venous insufficiency   • Coronary artery disease involving native coronary artery of native heart without angina pectoris   • Sick sinus syndrome ([facility])   • Essential hypertension   • Pressure injury of skin of heel   • Abscess, right hip   • Chronic pain   • Chronic indwelling Horan catheter   • Abscess of right hip   • Anemia of chronic disease     Past Medical History:   Diagnosis Date   • Age-related osteoporosis with current pathological fracture 2020   • Arthritis    • Asthma    • Bilateral bunions 2020   • Cancer ([facility])    • Cardiac pacemaker syndrome 2020    Overview:  - heart block - implanted    • Charcot's joint of foot, left 2020   • Chronic deep vein thrombosis (DVT) of right lower extremity ([facility]) 2021   • Chronic pain syndrome 2021   • Chronic sinusitis    • COPD (chronic obstructive pulmonary disease) ([facility])    • Coronary artery disease    • 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   • Elevated cholesterol    • Eustachian  "tube dysfunction    • Heart disease    • Herpes simplex    • History of transfusion    • Hyperlipidemia    • Hypertension    • Hypothyroidism 12/23/2020   • Intrinsic asthma 12/23/2020   • Knee dislocation    • Labral tear of right hip joint    • Laryngitis sicca    • Laryngitis, chronic    • Left carotid bruit 3/9/2016   • MI (myocardial infarction) ([facility])    • Myalgia due to statin 6/25/2019   • Open wound of right hip 9/14/2021   • Osteomyelitis of right femur ([facility]) 7/6/2021   • Otorrhea    • Pacemaker 11/17/2016   • Primary osteoarthritis of left knee 12/23/2020   • Psoriasis vulgaris 12/23/2020   • S/P coronary artery stent placement 3/9/2016   • Sensorineural hearing loss    • Seropositive rheumatoid arthritis of multiple sites ([facility]) 12/27/2019    Overview:  -myochrysine '93-'96 -methotrexate '96--->11/98;r/s  restarted 2/99--> 8/14 (anemia) -sulfasalazine- not effective -penicillamine 6/98-->10/98; no effect -leflunomide 11/98--> - Humira '13-->didn't take - Enbrel 12/14-->3/15- no effect!   Last Assessment & Plan:  - \"aching all over\" because she had to be off her anti-rheumatic drugs for 2 weeks in preparation for her R knee surgery - he   • Sick sinus syndrome ([facility]) 12/27/2019   • Sjogren's disease ([facility])    • Spondylolisthesis of lumbar region 1/17/2018   • Syncope, recurrent 2/8/2021     Past Surgical History:   Procedure Laterality Date   • A-V CARDIAC PACEMAKER INSERTION  2016   • ATRIAL CARDIAC PACEMAKER INSERTION     • CARDIAC CATHETERIZATION     • CATARACT EXTRACTION     • CERVICAL CORPECTOMY N/A 3/3/2021    Procedure: CERVICAL 6 CORPECTOMY WITH TITANIUM CAGE WITH NEURO MONITORING;  Surgeon: Bandar Shea MD;  Location: [facility];  Service: Neurosurgery;  Laterality: N/A;   • COLONOSCOPY  11/08/2011    One fold in the ascending colon which showed ulcer otherwise normal exam   • COLONOSCOPY  11/12/2004    Normal exam repeat in five years   • CORONARY ANGIOPLASTY WITH STENT PLACEMENT      X 2; " 2013 & 2014   • ENDOSCOPY  07/10/2014    Normal exam   • FLAP LEG Right 9/14/2021    Procedure: RIGHT GLUTEAL FASCIOCUTANEOUS ADVANCEMENT FLAP AND RIGHT TENSOR FASCIAL JESSICA FLAP;  Surgeon: Amadeo Turner MD;  Location:  [facility];  Service: Plastics;  Laterality: Right;   • HIP ABDUCTION TENOTOMY BILATERAL Right 1/14/2021    Procedure: RIGHT HIP GLUTEUS MEDLUS / MINIMUS REPAIR, POSSIBLE ACHILLES ALLOGRAFT;  Surgeon: Nino Carlson MD;  Location:  [facility];  Service: Orthopedics;  Laterality: Right;   • INCISION AND DRAINAGE ABSCESS Right 6/4/2022    Procedure: INCISION AND DRAINAGE ABSCESS right hip;  Surgeon: Magda Salcido MD;  Location:  [facility];  Service: General;  Laterality: Right;   • INCISION AND DRAINAGE HIP Right 2/9/2021    Procedure: HIP INCISION AND DRAINAGE;  Surgeon: Nino Carlson MD;  Location:  [facility];  Service: Orthopedics;  Laterality: Right;   • INCISION AND DRAINAGE LEG Right 10/24/2021    Procedure: INCISION AND DRAINAGE LOWER EXTREMITY;  Surgeon: Amadeo Turner MD;  Location:  [facility];  Service: Plastics;  Laterality: Right;   • INCISION AND DRAINAGE OF WOUND Right 7/8/2021    Procedure: INCISION AND DRAINAGE WOUND RIGHT HIP;  Surgeon: James Huntley MD;  Location:  [facility];  Service: Orthopedics;  Laterality: Right;   • JOINT REPLACEMENT     • KYPHOPLASTY WITH BIOPSY Bilateral 10/26/2021    Procedure: THOARCIC 12 KYPHOPLASTY WITH BIOPSY;  Surgeon: Bandar Shea MD;  Location:  [facility];  Service: Neurosurgery;  Laterality: Bilateral;   • LEG DEBRIDEMENT Right 9/14/2021    Procedure: DEBRIDEMENT OF RIGHT HIP WOUND, RIGHT GLUTEAL FASCIOCUTANEOUS ADVANCEMENT FLAP AND RIGHT TENSOR FASCIAL JESSICA FLAP;  Surgeon: Amadeo Turner MD;  Location:  [facility];  Service: Plastics;  Laterality: Right;   • LUMBAR DISCECTOMY Right 3/23/2021    Procedure: LUMBAR DISCECTOMY MICRO, Lumbar 1/2 right;  Surgeon: Bandar Shea MD;  Location:  [facility];  Service:  Neurosurgery;  Laterality: Right;   • LUMBAR FUSION N/A 1/19/2018    Procedure: L3-4,L4-5 DECOMPRESSION, POSTERIOR SPINAL FUSION WITH INSTRUMENTATION;  Surgeon: Fortino Oropeza MD;  Location:  [facility];  Service:    • LUMBAR LAMINECTOMY WITH FUSION Left 1/17/2018    Procedure: LEFT L3-4 L4-5 LATERAL LUMBAR INTERBODY FUSION;  Surgeon: Fortino Oropeza MD;  Location:  [facility];  Service:    • MYRINGOTOMY W/ TUBES  09/04/2014    TUBES NO LONGER IN PLACE   • OTHER SURGICAL HISTORY      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 so hardware was removed and spacers were placed   • REPLACEMENT TOTAL KNEE Right       General Information     Row Name 06/06/22 1300          Physical Therapy Time and Intention    Document Type evaluation  -MS     Mode of Treatment physical therapy;individual therapy  -MS     Row Name 06/06/22 1300          General Information    Patient Profile Reviewed yes  -MS     Prior Level of Function mod assist:;max assist:;bed mobility;dependent:;transfer  -MS     Existing Precautions/Restrictions fall;other (see comments)  wound vac  -MS     Barriers to Rehab physical barrier;cognitive status  -MS     Row Name 06/06/22 1300          Living Environment    People in Home facility resident  -MS     Row Name 06/06/22 1300          Cognition    Orientation Status (Cognition) oriented x 4  -MS           User Key  (r) = Recorded By, (t) = Taken By, (c) = Cosigned By    Initials Name Provider Type    Nicole Palacios, PT, DPT, NCS Physical Therapist               Mobility     Row Name 06/06/22 1300          Bed Mobility    Bed Mobility rolling left  -MS     Rolling Left [place] (Bed Mobility) dependent (less than 25% patient effort);2 person assist;verbal cues;nonverbal cues (demo/gesture)  -MS     Assistive Device (Bed Mobility) bed rails;draw sheet  -MS           User Key  (r) = Recorded By, (t) = Taken By, (c) = Cosigned By    Initials Name Provider Type    Nicole Palacios, PT, DPT, NCS Physical  Therapist               Obj/Interventions     Row Name 06/06/22 1300          Range of Motion Comprehensive    Comment, General Range of Motion B UEs WFL, pt does have deformities from arthritis, R LE AROM: hip flexion impaired 75%, knee ext impaired 75%, dorsiflexion impaired 75%. L LE AROM: hip flexion impaired 25%, dorsiflexion impaired 50%  -MS     Row Name 06/06/22 1300          Strength Comprehensive (MMT)    Comment, General Manual Muscle Testing (MMT) Assessment R LE: hip flexion 2/5, quad 2/5, hamstring 2-/5, dorsiflexion 2-/5, L LE hip flexion 4/5 within availabe range, quad 4/5, hamstring 4-/5, dorsiflexion 3-/5  -MS     Row Name 06/06/22 1300          Sensory Assessment (Somatosensory)    Sensory Assessment (Somatosensory) sensation intact  -MS           User Key  (r) = Recorded By, (t) = Taken By, (c) = Cosigned By    Initials Name Provider Type    MS Nicole Olson R, PT, DPT, NCS Physical Therapist               Goals/Plan     Row Name 06/06/22 1300          Bed Mobility Goal 1 (PT)    Activity/Assistive Device (Bed Mobility Goal 1, PT) rolling to right;bed rails  -MS     Christopher Level/Cues Needed (Bed Mobility Goal 1, PT) minimum assist (75% or more patient effort);tactile cues required;verbal cues required  -MS     Time Frame (Bed Mobility Goal 1, PT) long term goal (LTG);by discharge  -MS     Progress/Outcomes (Bed Mobility Goal 1, PT) goal ongoing  -MS     Row Name 06/06/22 1300          ROM Goal 1 (PT)    ROM Goal 1 (PT) pt will demonstrate AROM  through 50% of the range in all joints of her R LE  -MS     Time Frame (ROM Goal 1, PT) long-term goal (LTG);by discharge  -MS     Progress/Outcome (ROM Goal 1, PT) goal ongoing  -MS     Row Name 06/06/22 1300          Problem Specific Goal 1 (PT)    Problem Specific Goal 1 (PT) pt will demonstrate ability to roll to the R with mod A and use of bedrail  -MS     Time Frame (Problem Specific Goal 1, PT) long-term goal (LTG);by discharge  -MS      Progress/Outcome (Problem Specific Goal 1, PT) goal ongoing  -MS     Row Name 06/06/22 1300          Therapy Assessment/Plan (PT)    Planned Therapy Interventions (PT) bed mobility training;patient/family education;strengthening  -MS           User Key  (r) = Recorded By, (t) = Taken By, (c) = Cosigned By    Initials Name Provider Type    Nicole Palacios R, PT, DPT, NCS Physical Therapist               Clinical Impression     Row Name 06/06/22 1300          Pain    Pretreatment Pain Rating 0/10 - no pain  nauseated  -MS     Posttreatment Pain Rating 0/10 - no pain  -MS     Row Name 06/06/22 1300          Plan of Care Review    Plan of Care Reviewed With patient  -MS     Progress no change  -MS     Outcome Evaluation The patient presents alert and oriented x4 with complaints of nausea. 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 She uses a lift to get into and out of bed and is wheelchair bound. She does demonstrates improved LE strength since the last time I saw her. Her L LE is strong enough to assist with functional movement. PT will continue to work with her to work on bed mobility, and LE strengthening.  -MS     Row Name 06/06/22 1300          Therapy Assessment/Plan (PT)    Patient/Family Therapy Goals Statement (PT) get stronger  -MS     Rehab Potential (PT) fair, will monitor progress closely  -MS     Criteria for Skilled Interventions Met (PT) yes;meets criteria;skilled treatment is necessary  -MS     Therapy Frequency (PT) 2 times/day  -MS     Predicted Duration of Therapy Intervention (PT) until discharge  -MS     Row Name 06/06/22 1300          Positioning and Restraints    Post Treatment Position bed  -MS     In Bed fowlers;call light within reach;encouraged to call for assist;side rails up x2  -MS           User Key  (r) = Recorded By, (t) = Taken By, (c) = Cosigned By    Initials Name Provider Type    MS Olson  Nicole MCGEE, PT, DPT, NCS Physical Therapist               Outcome Measures     Row Name 06/06/22 1300          How much help from another person do you currently need...    Turning from your back to your side while in flat bed without using bedrails? 1  -MS     Moving from lying on back to sitting on the side of a flat bed without bedrails? 1  -MS     Moving to and from a bed to a chair (including a wheelchair)? 1  -MS     Standing up from a chair using your arms (e.g., wheelchair, bedside chair)? 1  -MS     Climbing 3-5 steps with a railing? 1  -MS     To walk in hospital room? 1  -MS     AM-PAC 6 Clicks Score (PT) 6  -MS     Highest level of mobility 2 --> Bed activities/dependent transfer  -MS     Row Name 06/06/22 1300          Functional Assessment    Outcome Measure Options AM-PAC 6 Clicks Basic Mobility (PT)  -MS           User Key  (r) = Recorded By, (t) = Taken By, (c) = Cosigned By    Initials Name Provider Type    MS Nicole Olson, PT, DPT, NCS Physical Therapist                             Physical Therapy Education                 Title: PT OT SLP Therapies (In Progress)     Topic: Physical Therapy (In Progress)     Point: Mobility training (Not Started)     Learner Progress:  Not documented in this visit.          Point: Home exercise program (Not Started)     Learner Progress:  Not documented in this visit.          Point: Body mechanics (Not Started)     Learner Progress:  Not documented in this visit.          Point: Precautions (Done)     Learning Progress Summary           Patient Acceptance, E, VU by MS at 6/6/2022 1684    Comment: wound vac role in pt care and healing                               User Key     Initials Effective Dates Name Provider Type Discipline    MS 06/19/18 -  Nicole Olson, PT, DPT, NCS Physical Therapist PT              PT Recommendation and Plan  Planned Therapy Interventions (PT): bed mobility training, patient/family education, strengthening  Plan of Care Reviewed  With: patient  Progress: no change  Outcome Evaluation: The patient presents alert and oriented x4 with complaints of nausea. 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 She uses a lift to get into and out of bed and is wheelchair bound. She does demonstrates improved LE strength since the last time I saw her. Her L LE is strong enough to assist with functional movement. PT will continue to work with her to work on bed mobility, and LE strengthening.     Time Calculation:    PT Charges     Row Name 06/06/22 1300 06/06/22 1137          Time Calculation    Start Time 1300  -MS 1000  -MS     Stop Time 1355  -MS 1100  -MS     Time Calculation (min) 55 min  -MS 60 min  -MS     PT Received On 06/06/22  -MS 06/06/22  -MS     PT Goal Re-Cert Due Date 06/17/22  -MS 06/17/22  -MS            Time Calculation- PT    Total Timed Code Minutes- PT 25 minute(s)  -MS --            Timed Charges    63065 - PT Therapeutic Activity Minutes 25  -MS --            Untimed Charges    PT Eval/Re-eval Minutes 60  -MS --  added to time in the afternoon  -MS     Wound Care -- 02661 Neg Press (DME) wound TO 50 sqcm  -MS     41668-See Pressure wound to 50 sqcm -- 30  -MS            Total Minutes    Timed Charges Total Minutes 25  -MS --     Untimed Charges Total Minutes 60  -MS 30  -MS      Total Minutes 85  -MS 30  -MS           User Key  (r) = Recorded By, (t) = Taken By, (c) = Cosigned By    Initials Name Provider Type    MS Nicole Olson, PT, DPT, NCS Physical Therapist              Therapy Charges for Today     Code Description Service Date Service Provider Modifiers Qty    75230927205 HC PT NEG PRESS WOUND TO 50SQCM DME2 6/6/2022 Nicole Olson, PT, DPT, NCS GP 1    57004257262 HC PT THERAPEUTIC ACT EA 15 MIN 6/6/2022 Nicole Olson, PT, DPT, NCS GP 2    [id number] HC PT EVAL LOW COMPLEXITY 4 6/6/2022 Nicole Olson, PT, DPT, NCS GP 1          PT  G-Codes  Outcome Measure Options: AM-PAC 6 Clicks Basic Mobility (PT)  AM-PAC 6 Clicks Score (PT): 6    Nicole Olson, PT, DPT, NCS  6/6/2022

## 2022-06-07 ENCOUNTER — APPOINTMENT (OUTPATIENT)
Dept: GENERAL RADIOLOGY | Facility: HOSPITAL | Age: 67
End: 2022-06-07

## 2022-06-07 PROCEDURE — 25010000002 CEFEPIME PER 500 MG: Performed by: SPECIALIST

## 2022-06-07 PROCEDURE — 74018 RADEX ABDOMEN 1 VIEW: CPT

## 2022-06-07 PROCEDURE — 97110 THERAPEUTIC EXERCISES: CPT

## 2022-06-07 PROCEDURE — 99233 SBSQ HOSP IP/OBS HIGH 50: CPT | Performed by: NURSE PRACTITIONER

## 2022-06-07 PROCEDURE — 99024 POSTOP FOLLOW-UP VISIT: CPT | Performed by: SPECIALIST

## 2022-06-07 RX ORDER — BISACODYL 10 MG
10 SUPPOSITORY, RECTAL RECTAL DAILY
Status: DISCONTINUED | OUTPATIENT
Start: 2022-06-07 | End: 2022-06-14 | Stop reason: HOSPADM

## 2022-06-07 RX ORDER — MAGNESIUM CARB/ALUMINUM HYDROX 105-160MG
150 TABLET,CHEWABLE ORAL ONCE
Status: DISCONTINUED | OUTPATIENT
Start: 2022-06-07 | End: 2022-06-14 | Stop reason: HOSPADM

## 2022-06-07 RX ORDER — LISINOPRIL 10 MG/1
10 TABLET ORAL
Status: DISCONTINUED | OUTPATIENT
Start: 2022-06-08 | End: 2022-06-11

## 2022-06-07 RX ADMIN — APIXABAN 5 MG: 5 TABLET, FILM COATED ORAL at 11:07

## 2022-06-07 RX ADMIN — APIXABAN 5 MG: 5 TABLET, FILM COATED ORAL at 20:59

## 2022-06-07 RX ADMIN — Medication 324 MG: at 11:07

## 2022-06-07 RX ADMIN — MAGNESIUM GLUCONATE 500 MG ORAL TABLET 400 MG: 500 TABLET ORAL at 11:09

## 2022-06-07 RX ADMIN — Medication 1 TABLET: at 11:09

## 2022-06-07 RX ADMIN — POLYETHYLENE GLYCOL 3350 17 G: 17 POWDER, FOR SOLUTION ORAL at 11:11

## 2022-06-07 RX ADMIN — VALACYCLOVIR HYDROCHLORIDE 500 MG: 500 TABLET, FILM COATED ORAL at 11:07

## 2022-06-07 RX ADMIN — Medication 10 ML: at 21:00

## 2022-06-07 RX ADMIN — DOCUSATE SODIUM 50 MG AND SENNOSIDES 8.6 MG 2 TABLET: 8.6; 5 TABLET, FILM COATED ORAL at 20:59

## 2022-06-07 RX ADMIN — SUCRALFATE 1 G: 1 TABLET ORAL at 06:00

## 2022-06-07 RX ADMIN — CARVEDILOL 25 MG: 25 TABLET, FILM COATED ORAL at 17:35

## 2022-06-07 RX ADMIN — CEFEPIME 2 G: 2 INJECTION, POWDER, FOR SOLUTION INTRAVENOUS at 03:23

## 2022-06-07 RX ADMIN — Medication 10 ML: at 11:12

## 2022-06-07 RX ADMIN — THIAMINE HCL TAB 100 MG 100 MG: 100 TAB at 11:07

## 2022-06-07 RX ADMIN — HYDROCODONE BITARTRATE AND ACETAMINOPHEN 1 TABLET: 7.5; 325 TABLET ORAL at 20:58

## 2022-06-07 RX ADMIN — Medication 250 MG: at 11:09

## 2022-06-07 RX ADMIN — DOCUSATE SODIUM 100 MG: 100 CAPSULE, LIQUID FILLED ORAL at 11:09

## 2022-06-07 RX ADMIN — LISINOPRIL 5 MG: 5 TABLET ORAL at 11:09

## 2022-06-07 RX ADMIN — ASPIRIN 81 MG: 81 TABLET, COATED ORAL at 11:09

## 2022-06-07 RX ADMIN — FAMOTIDINE 40 MG: 20 TABLET, FILM COATED ORAL at 11:09

## 2022-06-07 RX ADMIN — SODIUM CHLORIDE, POTASSIUM CHLORIDE, SODIUM LACTATE AND CALCIUM CHLORIDE 50 ML/HR: 600; 310; 30; 20 INJECTION, SOLUTION INTRAVENOUS at 21:01

## 2022-06-07 RX ADMIN — OXYCODONE HYDROCHLORIDE AND ACETAMINOPHEN 500 MG: 500 TABLET ORAL at 11:07

## 2022-06-07 RX ADMIN — SUCRALFATE 1 G: 1 TABLET ORAL at 20:59

## 2022-06-07 RX ADMIN — SUCRALFATE 1 G: 1 TABLET ORAL at 17:35

## 2022-06-07 RX ADMIN — ISOSORBIDE MONONITRATE 60 MG: 60 TABLET, EXTENDED RELEASE ORAL at 11:09

## 2022-06-07 RX ADMIN — FLUTICASONE PROPIONATE 1 SPRAY: 50 SPRAY, METERED NASAL at 11:11

## 2022-06-07 RX ADMIN — LIDOCAINE 1 PATCH: 50 PATCH CUTANEOUS at 11:09

## 2022-06-07 RX ADMIN — SUCRALFATE 1 G: 1 TABLET ORAL at 11:07

## 2022-06-07 RX ADMIN — CEFEPIME 2 G: 2 INJECTION, POWDER, FOR SOLUTION INTRAVENOUS at 20:58

## 2022-06-07 RX ADMIN — CEFEPIME 2 G: 2 INJECTION, POWDER, FOR SOLUTION INTRAVENOUS at 11:04

## 2022-06-07 RX ADMIN — CARVEDILOL 25 MG: 25 TABLET, FILM COATED ORAL at 11:07

## 2022-06-07 RX ADMIN — LEVOTHYROXINE SODIUM 75 MCG: 75 TABLET ORAL at 06:00

## 2022-06-07 RX ADMIN — DOCUSATE SODIUM 50 MG AND SENNOSIDES 8.6 MG 2 TABLET: 8.6; 5 TABLET, FILM COATED ORAL at 11:07

## 2022-06-07 RX ADMIN — DULOXETINE HYDROCHLORIDE 60 MG: 30 CAPSULE, DELAYED RELEASE ORAL at 11:09

## 2022-06-07 RX ADMIN — DOCUSATE SODIUM 100 MG: 100 CAPSULE, LIQUID FILLED ORAL at 20:59

## 2022-06-07 RX ADMIN — FUROSEMIDE 40 MG: 40 TABLET ORAL at 11:09

## 2022-06-07 RX ADMIN — COLLAGENASE SANTYL 1 APPLICATION: 250 OINTMENT TOPICAL at 11:11

## 2022-06-07 NOTE — THERAPY TREATMENT NOTE
Acute Care - Physical Therapy Treatment Note  [facility]     Patient Name: Tawny Shin  : 1955  MRN: [id number]  Today's Date: 2022      Visit Dx:     ICD-10-CM ICD-9-CM   1. Abscess of right hip  L02.415 682.6   2. Impaired mobility  Z74.09 799.89     Patient Active Problem List   Diagnosis   • T12 compression fracture, initial encounter (Formerly [facility])   • Chronic embolism and thrombosis of unspecified deep veins of left lower extremity (Formerly [facility])   • Urinary tract infection without hematuria   • Acute bilateral thoracic back pain   • Chronic anticoagulation   • Hypokalemia   • Transaminitis   • Iron deficiency anemia   • Osteoporosis   • Bacteremia   • Epidural hematoma (Formerly [facility])   • Pleural effusion, left   • Functional neurological symptom disorder with weakness or paralysis   • Overweight (BMI 25.0-29.9)   • Rheumatoid arthritis involving multiple sites (Formerly [facility])   • (HFpEF) heart failure with preserved ejection fraction (Formerly [facility]), acute on chronic   • Venous insufficiency   • Coronary artery disease involving native coronary artery of native heart without angina pectoris   • Sick sinus syndrome (Formerly [facility])   • Essential hypertension   • Pressure injury of skin of heel   • Abscess, right hip   • Chronic pain   • Chronic indwelling Horan catheter   • Abscess of right hip   • Anemia of chronic disease     Past Medical History:   Diagnosis Date   • Age-related osteoporosis with current pathological fracture 2020   • Arthritis    • Asthma    • Bilateral bunions 2020   • Cancer (Formerly [facility])    • Cardiac pacemaker syndrome 2020    Overview:  - heart block - implanted    • Charcot's joint of foot, left 2020   • Chronic deep vein thrombosis (DVT) of right lower extremity (Formerly [facility]) 2021   • Chronic pain syndrome 2021   • Chronic sinusitis    • COPD (chronic obstructive pulmonary disease) (Formerly [facility])    • Coronary artery disease    • 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   • Elevated cholesterol    •  "Eustachian tube dysfunction    • Heart disease    • Herpes simplex    • History of transfusion    • Hyperlipidemia    • Hypertension    • Hypothyroidism 12/23/2020   • Intrinsic asthma 12/23/2020   • Knee dislocation    • Labral tear of right hip joint    • Laryngitis sicca    • Laryngitis, chronic    • Left carotid bruit 3/9/2016   • MI (myocardial infarction) ([facility])    • Myalgia due to statin 6/25/2019   • Open wound of right hip 9/14/2021   • Osteomyelitis of right femur ([facility]) 7/6/2021   • Otorrhea    • Pacemaker 11/17/2016   • Primary osteoarthritis of left knee 12/23/2020   • Psoriasis vulgaris 12/23/2020   • S/P coronary artery stent placement 3/9/2016   • Sensorineural hearing loss    • Seropositive rheumatoid arthritis of multiple sites ([facility]) 12/27/2019    Overview:  -myochrysine '93-'96 -methotrexate '96--->11/98;r/s  restarted 2/99--> 8/14 (anemia) -sulfasalazine- not effective -penicillamine 6/98-->10/98; no effect -leflunomide 11/98--> - Humira '13-->didn't take - Enbrel 12/14-->3/15- no effect!   Last Assessment & Plan:  - \"aching all over\" because she had to be off her anti-rheumatic drugs for 2 weeks in preparation for her R knee surgery - he   • Sick sinus syndrome ([facility]) 12/27/2019   • Sjogren's disease ([facility])    • Spondylolisthesis of lumbar region 1/17/2018   • Syncope, recurrent 2/8/2021     Past Surgical History:   Procedure Laterality Date   • A-V CARDIAC PACEMAKER INSERTION  2016   • ATRIAL CARDIAC PACEMAKER INSERTION     • CARDIAC CATHETERIZATION     • CATARACT EXTRACTION     • CERVICAL CORPECTOMY N/A 3/3/2021    Procedure: CERVICAL 6 CORPECTOMY WITH TITANIUM CAGE WITH NEURO MONITORING;  Surgeon: Bandar Shea MD;  Location: [facility];  Service: Neurosurgery;  Laterality: N/A;   • COLONOSCOPY  11/08/2011    One fold in the ascending colon which showed ulcer otherwise normal exam   • COLONOSCOPY  11/12/2004    Normal exam repeat in five years   • CORONARY ANGIOPLASTY WITH STENT " PLACEMENT      X 2; 2013 & 2014   • ENDOSCOPY  07/10/2014    Normal exam   • FLAP LEG Right 9/14/2021    Procedure: RIGHT GLUTEAL FASCIOCUTANEOUS ADVANCEMENT FLAP AND RIGHT TENSOR FASCIAL JESSICA FLAP;  Surgeon: Amadeo Turner MD;  Location:  [facility];  Service: Plastics;  Laterality: Right;   • HIP ABDUCTION TENOTOMY BILATERAL Right 1/14/2021    Procedure: RIGHT HIP GLUTEUS MEDLUS / MINIMUS REPAIR, POSSIBLE ACHILLES ALLOGRAFT;  Surgeon: Nino Carlson MD;  Location:  [facility];  Service: Orthopedics;  Laterality: Right;   • INCISION AND DRAINAGE ABSCESS Right 6/4/2022    Procedure: INCISION AND DRAINAGE ABSCESS right hip;  Surgeon: Magda Salcido MD;  Location:  [facility];  Service: General;  Laterality: Right;   • INCISION AND DRAINAGE HIP Right 2/9/2021    Procedure: HIP INCISION AND DRAINAGE;  Surgeon: Nino Carlson MD;  Location:  [facility];  Service: Orthopedics;  Laterality: Right;   • INCISION AND DRAINAGE LEG Right 10/24/2021    Procedure: INCISION AND DRAINAGE LOWER EXTREMITY;  Surgeon: Amadeo Turner MD;  Location:  [facility];  Service: Plastics;  Laterality: Right;   • INCISION AND DRAINAGE OF WOUND Right 7/8/2021    Procedure: INCISION AND DRAINAGE WOUND RIGHT HIP;  Surgeon: James Huntley MD;  Location:  [facility];  Service: Orthopedics;  Laterality: Right;   • JOINT REPLACEMENT     • KYPHOPLASTY WITH BIOPSY Bilateral 10/26/2021    Procedure: THOARCIC 12 KYPHOPLASTY WITH BIOPSY;  Surgeon: Bandar Shea MD;  Location:  [facility];  Service: Neurosurgery;  Laterality: Bilateral;   • LEG DEBRIDEMENT Right 9/14/2021    Procedure: DEBRIDEMENT OF RIGHT HIP WOUND, RIGHT GLUTEAL FASCIOCUTANEOUS ADVANCEMENT FLAP AND RIGHT TENSOR FASCIAL JESSICA FLAP;  Surgeon: Amadeo Turner MD;  Location:  [facility];  Service: Plastics;  Laterality: Right;   • LUMBAR DISCECTOMY Right 3/23/2021    Procedure: LUMBAR DISCECTOMY MICRO, Lumbar 1/2 right;  Surgeon: Bandar Shea MD;  Location:  [facility];   Service: Neurosurgery;  Laterality: Right;   • LUMBAR FUSION N/A 1/19/2018    Procedure: L3-4,L4-5 DECOMPRESSION, POSTERIOR SPINAL FUSION WITH INSTRUMENTATION;  Surgeon: Fortino Oropeza MD;  Location:  [facility];  Service:    • LUMBAR LAMINECTOMY WITH FUSION Left 1/17/2018    Procedure: LEFT L3-4 L4-5 LATERAL LUMBAR INTERBODY FUSION;  Surgeon: Fortino Oropeza MD;  Location:  [facility];  Service:    • MYRINGOTOMY W/ TUBES  09/04/2014    TUBES NO LONGER IN PLACE   • OTHER SURGICAL HISTORY      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 so hardware was removed and spacers were placed   • REPLACEMENT TOTAL KNEE Right      PT Assessment (last 12 hours)     PT Evaluation and Treatment     Row Name 06/07/22 1030          Physical Therapy Time and Intention    Subjective Information no complaints  -LY     Document Type therapy note (daily note)  -LY     Mode of Treatment physical therapy  -LY     Comment w/v intact  -LY     Row Name 06/07/22 1030          General Information    Existing Precautions/Restrictions fall;other (see comments)  wound vac  -LY     Row Name 06/07/22 1030          Pain    Pretreatment Pain Rating 0/10 - no pain  -LY     Posttreatment Pain Rating 0/10 - no pain  -LY     Pain Intervention(s) Medication (See MAR)  -LY     Row Name 06/07/22 1030          Bed Mobility    Bed Mobility rolling left;rolling right  -LY     Rolling Left [place] (Bed Mobility) supervision;moderate assist (50% patient effort)  -LY     Rolling Right [place] (Bed Mobility) verbal cues;moderate assist (50% patient effort)  -LY     Bed Mobility, Safety Issues decreased use of legs for bridging/pushing  -LY     Assistive Device (Bed Mobility) bed rails  -LY     Row Name 06/07/22 1030          Motor Skills    Therapeutic Exercise hip;knee;ankle;stretching;core strength  -LY     Row Name 06/07/22 1030          Hip (Therapeutic Exercise)    Hip (Therapeutic Exercise) AROM (active range of motion);AAROM (active assistive range of  motion)  -LY     Hip AROM (Therapeutic Exercise) left;aBduction;aDduction;external rotation;internal rotation;supine;15 repititions  -LY     Hip AAROM (Therapeutic Exercise) right;flexion;extension;aBduction;aDduction;external rotation;internal rotation;supine  x15  -LY     Row Name 06/07/22 1030          Knee (Therapeutic Exercise)    Knee (Therapeutic Exercise) AAROM (active assistive range of motion);AROM (active range of motion);isometric exercises  -LY     Knee AROM (Therapeutic Exercise) left;heel slides;supine;15 repititions  -LY     Knee AAROM (Therapeutic Exercise) right;flexion;extension;supine  x15  -LY     Knee Isometrics (Therapeutic Exercise) bilateral;gluteal sets;quad sets;supine;10 repetitions;3 second hold  -LY     Row Name 06/07/22 1030          Ankle (Therapeutic Exercise)    Ankle (Therapeutic Exercise) AROM (active range of motion);AAROM (active assistive range of motion)  -LY     Ankle AROM (Therapeutic Exercise) left;dorsiflexion;plantarflexion;supine;15 repititions  -LY     Ankle AAROM (Therapeutic Exercise) right;dorsiflexion;plantarflexion;supine  x15  -LY     Row Name 06/07/22 1030          Core Strength (Therapeutic Exercise)    Core Strength (Therapeutic Exercise) bridging, bilateral lower extremities;supine;5 repetitions  -LY     Comment (Core Strength Therapeutic Exercise) unable to clear bed at this time, supported BLEs  -LY     Row Name 06/07/22 1030          Wound 06/04/22 0211 Right anterior greater trochanter    Wound - Properties Group Placement Date: 06/04/22  -LV Placement Time: 0211  -LV Present on Hospital Admission: Y  -LV Side: Right  -LV Orientation: anterior  -LV Location: greater trochanter  -LV     Dressing Appearance dry;intact  -LY     Base dressing in place, unable to visualize  -LY     Wound Output (mL) 50  -LY     Retired Wound - Properties Group Placement Date: 06/04/22  -LV Placement Time: 0211  -LV Present on Hospital Admission: Y  -LV Side: Right  -LV  Orientation: anterior  -LV Location: greater trochanter  -LV     Retired Wound - Properties Group Date first assessed: 06/04/22  -LV Time first assessed: 0211  -LV Present on Hospital Admission: Y  -LV Side: Right  -LV Location: greater trochanter  -LV     Row Name             Wound Left posterior heel Pressure Injury    Wound - Properties Group Side: Left  -MT Orientation: posterior  -MT Location: heel  -MT Primary Wound Type: Pressure inj  -MT Wound Outcome: Healed  -MT     Retired Wound - Properties Group Side: Left  -MT Orientation: posterior  -MT Location: heel  -MT Primary Wound Type: Pressure inj  -MT Wound Outcome: Healed  -MT     Retired Wound - Properties Group Side: Left  -MT Location: heel  -MT Primary Wound Type: Pressure inj  -MT Wound Outcome: Healed  -MT     Row Name             Wound Right lateral leg Pressure Injury    Wound - Properties Group Present on Hospital Admission: Y  -MT Side: Right  -MT Orientation: lateral  -MT Location: leg  -MT Primary Wound Type: Pressure inj  -MT     Retired Wound - Properties Group Present on Hospital Admission: Y  -MT Side: Right  -MT Orientation: lateral  -MT Location: leg  -MT Primary Wound Type: Pressure inj  -MT     Retired Wound - Properties Group Present on Hospital Admission: Y  -MT Side: Right  -MT Location: leg  -MT Primary Wound Type: Pressure inj  -MT     Row Name             Wound 04/22/22 1726 Left anterior greater trochanter    Wound - Properties Group Placement Date: 04/22/22  -MT Placement Time: 1726  -MT Present on Hospital Admission: Y  -MT Side: Left  -MT Orientation: anterior  -MT Location: greater trochanter  -MT     Retired Wound - Properties Group Placement Date: 04/22/22  -MT Placement Time: 1726  -MT Present on Hospital Admission: Y  -MT Side: Left  -MT Orientation: anterior  -MT Location: greater trochanter  -MT     Retired Wound - Properties Group Date first assessed: 04/22/22  -MT Time first assessed: 1726  -MT Present on Hospital  Admission: Y  -MT Side: Left  -MT Location: greater trochanter  -MT     Row Name             Wound 06/04/22 0827 Right anterior hip Incision    Wound - Properties Group Placement Date: 06/04/22  -HW Placement Time: 0827 -HW Side: Right  -HW Orientation: anterior  -HW Location: hip  -HW Primary Wound Type: Incision  -HW     Retired Wound - Properties Group Placement Date: 06/04/22  -HW Placement Time: 0827 -HW Side: Right  -HW Orientation: anterior  -HW Location: hip  -HW Primary Wound Type: Incision  -HW     Retired Wound - Properties Group Date first assessed: 06/04/22  -HW Time first assessed: 0827 -HW Side: Right  -HW Location: hip  -HW Primary Wound Type: Incision  -HW     Row Name             Wound 06/07/22 0414 Right gluteal    Wound - Properties Group Placement Date: 06/07/22  -LV Placement Time: 0414  -LV Side: Right  -LV Location: gluteal  -LV     Retired Wound - Properties Group Placement Date: 06/07/22  -LV Placement Time: 0414  -LV Side: Right  -LV Location: gluteal  -LV     Retired Wound - Properties Group Date first assessed: 06/07/22  -LV Time first assessed: 0414  -LV Side: Right  -LV Location: gluteal  -LV     Row Name 06/07/22 1030          NPWT (Negative Pressure Wound Therapy) 06/06/22 1000 R hip    NPWT (Negative Pressure Wound Therapy) - Properties Group Placement Date: 06/06/22  -MS Placement Time: 1000  -MS Location: R hip  -MS     Therapy Setting continuous therapy  -LY     Pressure Setting 125 mmHg  -LY     Retired NPWT (Negative Pressure Wound Therapy) - Properties Group Placement Date: 06/06/22  -MS Placement Time: 1000  -MS Location: R hip  -MS     Retired NPWT (Negative Pressure Wound Therapy) - Properties Group Placement Date: 06/06/22  -MS Placement Time: 1000  -MS Location: R hip  -MS     Row Name 06/07/22 1030          Plan of Care Review    Plan of Care Reviewed With patient  -LY     Progress improving  -LY     Outcome Evaluation PT tx completed. W/v to L hip intact. Pt able to  complete LLE AROM x15 reps, AAROM to LLE x15 reps. Completed B quad and glute sets x10 reps witih cues for technique. Attempted bridging, assisted with BLE support, unable to clear bed at this time. Able to roll L and R with mod x1. Will cont to follow. Recommend d/c back to SNF.  -LY     Row Name 06/07/22 1030          Positioning and Restraints    Pre-Treatment Position in bed  -LY     Post Treatment Position bed  -LY     In Bed fowlers;call light within reach;encouraged to call for assist;exit alarm on  -LY           User Key  (r) = Recorded By, (t) = Taken By, (c) = Cosigned By    Initials Name Provider Type    MS Nicole Olson, PT, DPT, NCS Physical Therapist    Leeann Younger RN Registered Nurse    Bianca Hallman, PTA Physical Therapist Assistant    Bnejie Melendez RN Registered Nurse    Consuelo Rodríguez RN Registered Nurse                Physical Therapy Education                 Title: PT OT SLP Therapies (In Progress)     Topic: Physical Therapy (In Progress)     Point: Mobility training (Not Started)     Learner Progress:  Not documented in this visit.          Point: Home exercise program (Not Started)     Learner Progress:  Not documented in this visit.          Point: Body mechanics (Not Started)     Learner Progress:  Not documented in this visit.          Point: Precautions (Done)     Learning Progress Summary           Patient Acceptance, E, VU by MS at 6/6/2022 8304    Comment: wound vac role in pt care and healing                               User Key     Initials Effective Dates Name Provider Type Discipline    MS 06/19/18 -  Nicole Olson, PT, DPT, NCS Physical Therapist PT              PT Recommendation and Plan     Plan of Care Reviewed With: patient  Progress: improving  Outcome Evaluation: PT tx completed. W/v to L hip intact. Pt able to complete LLE AROM x15 reps, AAROM to LLE x15 reps. Completed B quad and glute sets x10 reps witih cues for technique. Attempted  bridging, assisted with BLE support, unable to clear bed at this time. Able to roll L and R with mod x1. Will cont to follow. Recommend d/c back to SNF.   Outcome Measures     Row Name 06/07/22 1100             How much help from another person do you currently need...    Turning from your back to your side while in flat bed without using bedrails? 2  -LY      Moving from lying on back to sitting on the side of a flat bed without bedrails? 1  -LY      Moving to and from a bed to a chair (including a wheelchair)? 1  -LY      Standing up from a chair using your arms (e.g., wheelchair, bedside chair)? 1  -LY      Climbing 3-5 steps with a railing? 1  -LY      To walk in hospital room? 1  -LY      AM-PAC 6 Clicks Score (PT) 7  -LY              Functional Assessment    Outcome Measure Options AM-PAC 6 Clicks Basic Mobility (PT)  -LY            User Key  (r) = Recorded By, (t) = Taken By, (c) = Cosigned By    Initials Name Provider Type    Bianca Hallman PTA Physical Therapist Assistant                 Time Calculation:    PT Charges     Row Name 06/07/22 1102             Time Calculation    Start Time 1030  -LY      Stop Time 1053  -LY      Time Calculation (min) 23 min  -LY      PT Received On 06/07/22  -LY              Time Calculation- PT    Total Timed Code Minutes- PT 23 minute(s)  -LY              Timed Charges    10694 - PT Therapeutic Exercise Minutes 23  -LY              Total Minutes    Timed Charges Total Minutes 23  -LY       Total Minutes 23  -LY            User Key  (r) = Recorded By, (t) = Taken By, (c) = Cosigned By    Initials Name Provider Type    Bianca Hallman PTA Physical Therapist Assistant              Therapy Charges for Today     Code Description Service Date Service Provider Modifiers Qty    [id number] HC PT THER PROC EA 15 MIN 6/7/2022 Bianca Appiah PTA GP 2          PT G-Codes  Outcome Measure Options: AM-PAC 6 Clicks Basic Mobility (PT)  AM-PAC 6 Clicks Score (PT): 7    Bianca CM  Td, PTA  6/7/2022

## 2022-06-07 NOTE — PROGRESS NOTES
[facility]  INPATIENT WOUND CARE    PROGRESS NOTE    Today's Date: 06/07/22    Patient Name: Tawny Shin  MRN: [id number]  CSN: [id number]  PCP: Leta Julian DO  Referring Provider: PASCUAL ADAMS  Attending Provider: Butch Chong MD  Length of Stay: 3    SUBJECTIVE   Chief Complaint: Wounds of lower extremities    HPI: Tawny Shin continues care in room 354.  Physical therapy reached out to inquire about the need for Unna boot change.  Removed Unna boots from bilateral lower extremities as there did not appear to be much compression due to decreased edema.  Left lower extremity is intact.  Right lower extremity has venous ulceration of right lateral distal lower leg.  Picture was taken during assessment and is below.    Patient is disoriented today.  She is alert to self only.  She is unable to tell me where she is and when asked what her date of birth that she continues to tell me her name.  She cannot attempts to tell me the history and states she cannot recall.  She is aware she is confused and says she just feels tired.  She denies pain.  Alerted RN to change of mental status.  RN is alerting Dr. Chong.    Visit Dx:    ICD-10-CM ICD-9-CM   1. Abscess of right hip  L02.415 682.6   2. Impaired mobility  Z74.09 799.89     Patient Active Problem List   Diagnosis   • T12 compression fracture, initial encounter (HCC)   • Chronic embolism and thrombosis of unspecified deep veins of left lower extremity (HCC)   • Urinary tract infection without hematuria   • Acute bilateral thoracic back pain   • Chronic anticoagulation   • Hypokalemia   • Transaminitis   • Iron deficiency anemia   • Osteoporosis   • Bacteremia   • Epidural hematoma (HCC)   • Pleural effusion, left   • Functional neurological symptom disorder with weakness or paralysis   • Overweight (BMI 25.0-29.9)   • Rheumatoid arthritis involving multiple sites (HCC)   • (HFpEF) heart failure with preserved ejection  fraction ([facility]), acute on chronic   • Venous insufficiency   • Coronary artery disease involving native coronary artery of native heart without angina pectoris   • Sick sinus syndrome ([facility])   • Essential hypertension   • Pressure injury of skin of heel   • Abscess, right hip   • Chronic pain   • Chronic indwelling Horan catheter   • Abscess of right hip   • Anemia of chronic disease       History:   Past Medical History:   Diagnosis Date   • Age-related osteoporosis with current pathological fracture 5/27/2020   • Arthritis    • Asthma    • Bilateral bunions 12/23/2020   • Cancer ([facility])    • Cardiac pacemaker syndrome 12/23/2020    Overview:  - heart block - implanted 11/16   • Charcot's joint of foot, left 12/23/2020   • Chronic deep vein thrombosis (DVT) of right lower extremity ([facility]) 6/23/2021   • Chronic pain syndrome 6/22/2021   • Chronic sinusitis    • COPD (chronic obstructive pulmonary disease) ([facility])    • Coronary artery disease    • 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   • Elevated cholesterol    • Eustachian tube dysfunction    • Heart disease    • Herpes simplex    • History of transfusion    • Hyperlipidemia    • Hypertension    • Hypothyroidism 12/23/2020   • Intrinsic asthma 12/23/2020   • Knee dislocation    • Labral tear of right hip joint    • Laryngitis sicca    • Laryngitis, chronic    • Left carotid bruit 3/9/2016   • MI (myocardial infarction) ([facility])    • Myalgia due to statin 6/25/2019   • Open wound of right hip 9/14/2021   • Osteomyelitis of right femur ([facility]) 7/6/2021   • Otorrhea    • Pacemaker 11/17/2016   • Primary osteoarthritis of left knee 12/23/2020   • Psoriasis vulgaris 12/23/2020   • S/P coronary artery stent placement 3/9/2016   • Sensorineural hearing loss    • Seropositive rheumatoid arthritis of multiple sites ([facility]) 12/27/2019    Overview:  -myochrysine '93-'96 -methotrexate '96--->11/98;r/s  restarted 2/99--> 8/14 (anemia) -sulfasalazine- not effective -penicillamine  "6/98-->10/98; no effect -leflunomide 11/98--> - Humira '13-->didn't take - Enbrel 12/14-->3/15- no effect!   Last Assessment & Plan:  - \"aching all over\" because she had to be off her anti-rheumatic drugs for 2 weeks in preparation for her R knee surgery - he   • Sick sinus syndrome (HCC) 12/27/2019   • Sjogren's disease (HCC)    • Spondylolisthesis of lumbar region 1/17/2018   • Syncope, recurrent 2/8/2021     Past Surgical History:   Procedure Laterality Date   • A-V CARDIAC PACEMAKER INSERTION  2016   • ATRIAL CARDIAC PACEMAKER INSERTION     • CARDIAC CATHETERIZATION     • CATARACT EXTRACTION     • CERVICAL CORPECTOMY N/A 3/3/2021    Procedure: CERVICAL 6 CORPECTOMY WITH TITANIUM CAGE WITH NEURO MONITORING;  Surgeon: Bandar Shea MD;  Location:  [facility];  Service: Neurosurgery;  Laterality: N/A;   • COLONOSCOPY  11/08/2011    One fold in the ascending colon which showed ulcer otherwise normal exam   • COLONOSCOPY  11/12/2004    Normal exam repeat in five years   • CORONARY ANGIOPLASTY WITH STENT PLACEMENT      X 2; 2013 & 2014   • ENDOSCOPY  07/10/2014    Normal exam   • FLAP LEG Right 9/14/2021    Procedure: RIGHT GLUTEAL FASCIOCUTANEOUS ADVANCEMENT FLAP AND RIGHT TENSOR FASCIAL JESSICA FLAP;  Surgeon: Amadeo Turner MD;  Location:  [facility];  Service: Plastics;  Laterality: Right;   • HIP ABDUCTION TENOTOMY BILATERAL Right 1/14/2021    Procedure: RIGHT HIP GLUTEUS MEDLUS / MINIMUS REPAIR, POSSIBLE ACHILLES ALLOGRAFT;  Surgeon: Nino Carlson MD;  Location:  [facility];  Service: Orthopedics;  Laterality: Right;   • INCISION AND DRAINAGE ABSCESS Right 6/4/2022    Procedure: INCISION AND DRAINAGE ABSCESS right hip;  Surgeon: Magda Salcido MD;  Location:  [facility];  Service: General;  Laterality: Right;   • INCISION AND DRAINAGE HIP Right 2/9/2021    Procedure: HIP INCISION AND DRAINAGE;  Surgeon: Nino Carlson MD;  Location:  [facility];  Service: Orthopedics;  Laterality: Right;   • INCISION AND " DRAINAGE LEG Right 10/24/2021    Procedure: INCISION AND DRAINAGE LOWER EXTREMITY;  Surgeon: Amadeo Turner MD;  Location:  [facility];  Service: Plastics;  Laterality: Right;   • INCISION AND DRAINAGE OF WOUND Right 7/8/2021    Procedure: INCISION AND DRAINAGE WOUND RIGHT HIP;  Surgeon: James Huntley MD;  Location:  [facility];  Service: Orthopedics;  Laterality: Right;   • JOINT REPLACEMENT     • KYPHOPLASTY WITH BIOPSY Bilateral 10/26/2021    Procedure: THOARCIC 12 KYPHOPLASTY WITH BIOPSY;  Surgeon: Bandar Shea MD;  Location:  [facility];  Service: Neurosurgery;  Laterality: Bilateral;   • LEG DEBRIDEMENT Right 9/14/2021    Procedure: DEBRIDEMENT OF RIGHT HIP WOUND, RIGHT GLUTEAL FASCIOCUTANEOUS ADVANCEMENT FLAP AND RIGHT TENSOR FASCIAL JESSICA FLAP;  Surgeon: Amadeo Turner MD;  Location:  [facility];  Service: Plastics;  Laterality: Right;   • LUMBAR DISCECTOMY Right 3/23/2021    Procedure: LUMBAR DISCECTOMY MICRO, Lumbar 1/2 right;  Surgeon: Bandar Shea MD;  Location:  [facility];  Service: Neurosurgery;  Laterality: Right;   • LUMBAR FUSION N/A 1/19/2018    Procedure: L3-4,L4-5 DECOMPRESSION, POSTERIOR SPINAL FUSION WITH INSTRUMENTATION;  Surgeon: Fortino Oropeza MD;  Location:  [facility];  Service:    • LUMBAR LAMINECTOMY WITH FUSION Left 1/17/2018    Procedure: LEFT L3-4 L4-5 LATERAL LUMBAR INTERBODY FUSION;  Surgeon: Fortino Oropeza MD;  Location:  [facility];  Service:    • MYRINGOTOMY W/ TUBES  09/04/2014    TUBES NO LONGER IN PLACE   • OTHER SURGICAL HISTORY      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 so hardware was removed and spacers were placed   • REPLACEMENT TOTAL KNEE Right      Social History     Socioeconomic History   • Marital status:    Tobacco Use   • Smoking status: Never Smoker   • Smokeless tobacco: Never Used   Vaping Use   • Vaping Use: Never used   Substance and Sexual Activity   • Alcohol use: No   • Drug use: Never   • Sexual activity: Defer        Allergies:  Allergies   Allergen Reactions   • Atorvastatin Other (See Comments)     LEG CRAMPS     • Amoxicillin Rash   • Escitalopram Rash   • Nabumetone Rash   • Niacin Er Rash   • Penicillin G Rash   • Penicillins Rash   • Simvastatin Rash       Medications:    Current Facility-Administered Medications:   •  acetaminophen (TYLENOL) tablet 650 mg, 650 mg, Oral, Q6H PRN, Magda Salcido MD, 650 mg at 06/06/22 1002  •  apixaban (ELIQUIS) tablet 5 mg, 5 mg, Oral, Q12H, Butch Chong MD, 5 mg at 06/07/22 1107  •  ascorbic acid (VITAMIN C) tablet 500 mg, 500 mg, Oral, Daily, Magda Salcido MD, 500 mg at 06/07/22 1107  •  aspirin EC tablet 81 mg, 81 mg, Oral, Daily, Magda Salcido MD, 81 mg at 06/07/22 1109  •  bisacodyl (DULCOLAX) suppository 10 mg, 10 mg, Rectal, Daily, Butch Chong MD  •  carvedilol (COREG) tablet 25 mg, 25 mg, Oral, BID With Meals, Magda Salcido MD, 25 mg at 06/07/22 1107  •  cefepime (MAXIPIME) 2 g/100 mL 0.9% NS (mbp), 2 g, Intravenous, Q8H, Magda Salcido MD, 2 g at 06/07/22 1104  •  collagenase ointment 1 application, 1 application, Topical, Q24H, Magda Salcido MD, 1 application at 06/07/22 1111  •  Diclofenac Sodium (VOLTAREN) 1 % gel 4 g, 4 g, Topical, 4x Daily PRN, Magda Salcido MD  •  docusate sodium (COLACE) capsule 100 mg, 100 mg, Oral, BID, Magda Salcido MD, 100 mg at 06/07/22 1109  •  droperidol (INAPSINE) injection 1.25 mg, 1.25 mg, Intravenous, Q6H PRN, Senthil Paul MD, 1.25 mg at 06/06/22 1718  •  DULoxetine (CYMBALTA) DR capsule 60 mg, 60 mg, Oral, Daily, Magda Salcido MD, 60 mg at 06/07/22 1109  •  famotidine (PEPCID) tablet 40 mg, 40 mg, Oral, Daily, Magda Salcido MD, 40 mg at 06/07/22 1109  •  ferrous gluconate tablet 324 mg, 324 mg, Oral, Daily With Breakfast, Magda Salcido MD, 324 mg at 06/07/22 1107  •  fluticasone (FLONASE) 50 MCG/ACT nasal spray 1 spray, 1 spray, Nasal, Daily, Magda Salcido MD, 1 spray  at 06/07/22 1111  •  furosemide (LASIX) tablet 40 mg, 40 mg, Oral, Daily, Magda Salcido MD, 40 mg at 06/07/22 1109  •  HYDROcodone-acetaminophen (NORCO) 7.5-325 MG per tablet 1 tablet, 1 tablet, Oral, Q8H PRN, Magda Salcido MD  •  hydrocortisone-bacitracin-zinc oxide-nystatin (MAGIC BARRIER) ointment 1 application, 1 application, Topical, PRN, Magda Salcido MD  •  isosorbide mononitrate (IMDUR) 24 hr tablet 60 mg, 60 mg, Oral, Daily, Magda Salcido MD, 60 mg at 06/07/22 1109  •  lactated ringers infusion, 50 mL/hr, Intravenous, Continuous, Magda Salcido MD, Last Rate: 50 mL/hr at 06/06/22 1704, 50 mL/hr at 06/06/22 1704  •  levothyroxine (SYNTHROID, LEVOTHROID) tablet 75 mcg, 75 mcg, Oral, Q AM, Magda Salcido MD, 75 mcg at 06/07/22 0600  •  lidocaine (LIDODERM) 5 % 1 patch, 1 patch, Transdermal, Q24H, Magda Salcido MD, 1 patch at 06/07/22 1109  •  [START ON 6/8/2022] lisinopril (PRINIVIL,ZESTRIL) tablet 10 mg, 10 mg, Oral, Q24H, Butch Chong MD  •  magnesium citrate solution 150 mL, 150 mL, Oral, Once, Butch Chong MD  •  magnesium hydroxide (MILK OF MAGNESIA) suspension 10 mL, 10 mL, Oral, Daily PRN, Magda Salcido MD, 10 mL at 06/06/22 1741  •  magnesium oxide (MAG-OX) tablet 400 mg, 400 mg, Oral, Daily, Magda Salcido MD, 400 mg at 06/07/22 1109  •  Menthol-Zinc Oxide, , Topical, BID, Magda Salcido MD, Given at 06/06/22 2151  •  methylnaltrexone (RELISTOR) injection 12 mg, 12 mg, Subcutaneous, Every Other Day, Magda Salcido MD, 12 mg at 06/06/22 0846  •  multivitamin with minerals 1 tablet, 1 tablet, Oral, Daily, Magda Salcido MD, 1 tablet at 06/07/22 1109  •  nitroglycerin (NITROSTAT) SL tablet 0.4 mg, 0.4 mg, Sublingual, Q5 Min PRN, Magda Salcido MD  •  ondansetron (ZOFRAN) tablet 4 mg, 4 mg, Oral, Q6H PRN **OR** ondansetron (ZOFRAN) injection 4 mg, 4 mg, Intravenous, Q6H PRN, Magda Salcido MD, 4 mg at 06/06/22 1057  •  polyethylene glycol  (MIRALAX) packet 17 g, 17 g, Oral, Daily, Magda Salcido MD, 17 g at 06/07/22 1111  •  saccharomyces boulardii (FLORASTOR) capsule 250 mg, 250 mg, Oral, Daily, Magda Salcido MD, 250 mg at 06/07/22 1109  •  sennosides-docusate (PERICOLACE) 8.6-50 MG per tablet 2 tablet, 2 tablet, Oral, BID, Magda Salcido MD, 2 tablet at 06/07/22 1107  •  [COMPLETED] Insert peripheral IV, , , Once **AND** sodium chloride 0.9 % flush 10 mL, 10 mL, Intravenous, PRN, Magda Salcido MD  •  sodium chloride 0.9 % flush 10 mL, 10 mL, Intravenous, Q12H, Magda Salcido MD, 10 mL at 06/07/22 1112  •  sodium chloride 0.9 % flush 10 mL, 10 mL, Intravenous, PRN, Magda Salcido MD  •  sucralfate (CARAFATE) tablet 1 g, 1 g, Oral, 4x Daily AC & at Bedtime, Magda Salcido MD, 1 g at 06/07/22 1107  •  thiamine (VITAMIN B-1) tablet 100 mg, 100 mg, Oral, Daily, Magda Salcido MD, 100 mg at 06/07/22 1107  •  traMADol (ULTRAM) tablet 100 mg, 100 mg, Oral, TID PRN, Magda Salcido MD  •  valACYclovir (VALTREX) tablet 500 mg, 500 mg, Oral, Daily, Magda Salcido MD, 500 mg at 06/07/22 1107    Review of Systems:  Review of Systems   Unable to perform ROS: Mental status change   Patient is unable to answer questions correctly.  She starts a sentence and seems to have difficulty with word finding versus loss of memory/disorientation.  This is new onset.      OBJECTIVE     Vitals:    06/07/22 0740   BP: 153/60   Pulse: 61   Resp: 18   Temp: 97.9 °F (36.6 °C)   SpO2: 94%       PHYSICAL EXAM  Physical Exam  Vitals and nursing note reviewed.   Constitutional:       General: She is awake.      Comments: Body mass index is 28.86 kg/m².    HENT:      Head: Normocephalic and atraumatic.   Eyes:      General: Lids are normal. Gaze aligned appropriately. No visual field deficit.  Cardiovascular:      Rate and Rhythm: Normal rate and regular rhythm.   Pulmonary:      Effort: Pulmonary effort is normal. No respiratory distress.   Abdominal:       General: There is no distension.      Palpations: Abdomen is soft.   Genitourinary:     Comments: Urethral catheter in place  Musculoskeletal:      Cervical back: Normal range of motion and neck supple.      Right lower leg: No edema.      Left lower leg: No edema.      Right foot: Decreased range of motion. Swelling present.      Left foot: Decreased range of motion. Swelling present.      Comments: Trace edema of bilateral feet   Feet:      Right foot:      Skin integrity: Skin integrity normal. No skin breakdown or dry skin.      Left foot:      Skin integrity: Skin integrity normal. No skin breakdown or dry skin.   Skin:     General: Skin is warm and dry.      Findings: Erythema and wound present.      Comments: Unna boots removed with silvercel moist with drainage.  Pressure injury of right lateral distal leg.  Picture below.  Wound measures 4.8cm x 1.3cm x 0.2cm.  Minimal slough noted with subcutaneous tissue present in the wound bed.  Edges are irregular and attached to wound bed.  No undermining or tunneling noted.  Moderate amount of serosanguineous drainage present.  Slight erythema extending from wound edges into the periwound area.  Minimal edema of the lower extremity.   Neurological:      Mental Status: She is disoriented and confused.      Cranial Nerves: No facial asymmetry.      Sensory: No sensory deficit.      Motor: Weakness present. No pronator drift.      Coordination: Finger-Nose-Finger Test normal.      Gait: Gait abnormal.      Comments: New onset confusion and disorientation noted.  Patient is aware she is confused.  She is oriented to self only.   Psychiatric:         Attention and Perception: Attention normal.         Mood and Affect: Mood normal.         Speech: Speech normal.         Behavior: Behavior is cooperative.         Cognition and Memory: Memory is impaired.                      Results Review:  Lab Results (last 48 hours)     Procedure Component Value Units Date/Time     Blood Culture - Blood, Arm, Right [[id number]]  (Normal) Collected: 06/03/22 2229    Specimen: Blood from Arm, Right Updated: 06/06/22 2247     Blood Culture No growth at 3 days    Blood Culture - Blood, Arm, Right [[id number]]  (Normal) Collected: 06/03/22 2229    Specimen: Blood from Arm, Right Updated: 06/06/22 2247     Blood Culture No growth at 3 days    Urine Culture - Urine, Urine, Clean Catch [[id number]]  (Abnormal)  (Susceptibility) Collected: 06/04/22 0130    Specimen: Urine, Clean Catch Updated: 06/06/22 1057     Urine Culture >100,000 CFU/mL Pseudomonas aeruginosa      >100,000 CFU/mL Escherichia coli    Narrative:      Colonization of the urinary tract without infection is common. Treatment is discouraged unless the patient is symptomatic, pregnant, or undergoing an invasive urologic procedure.    Susceptibility      Pseudomonas aeruginosa      LICHA      Cefepime Susceptible      Ceftazidime Susceptible      Ciprofloxacin Susceptible      Gentamicin Susceptible      Levofloxacin Susceptible      Piperacillin + Tazobactam Susceptible                    Linear View               Susceptibility      Escherichia coli      LICHA      Ampicillin Resistant     Ampicillin + Sulbactam Susceptible      Cefazolin Susceptible      Cefepime Susceptible      Ceftazidime Susceptible      Ceftriaxone Susceptible      Gentamicin Resistant     Levofloxacin Resistant     Nitrofurantoin Susceptible      Piperacillin + Tazobactam Susceptible      Tobramycin Susceptible      Trimethoprim + Sulfamethoxazole Resistant                   Linear View                   Tissue / Bone Culture - Tissue, Hip, Right [[id number]]  (Abnormal) Collected: 06/04/22 0834    Specimen: Tissue from Hip, Right Updated: 06/06/22 0942     Tissue Culture Light growth (2+) Staphylococcus aureus     Gram Stain Many (4+) WBCs seen      Few (2+) Gram positive cocci    Narrative:      Refer to previous blood culture collected on 6/4/2022 0428 for MICs       Wound Culture - Wound, Hip, Right [[id number]]  (Abnormal) Collected: 06/04/22 0834    Specimen: Wound from Hip, Right Updated: 06/06/22 0942     Wound Culture Scant growth (1+) Staphylococcus aureus     Gram Stain Moderate (3+) WBCs seen      Few (2+) Gram positive cocci    Narrative:      Refer to previous blood culture collected on 6/4/2022 0428 for MICs    Wound Culture - Wound, Hip [[id number]]  (Abnormal)  (Susceptibility) Collected: 06/04/22 0328    Specimen: Wound from Hip Updated: 06/06/22 0941     Wound Culture Light growth (2+) Staphylococcus aureus     Gram Stain Moderate (3+) WBCs seen      Few (2+) Gram positive cocci, intracellular and extracellular    Susceptibility      Staphylococcus aureus      LICHA      Clindamycin Resistant     Erythromycin Resistant     Inducible Clindamycin Resistance Negative      Oxacillin Susceptible      Rifampin Susceptible      Tetracycline Resistant     Trimethoprim + Sulfamethoxazole Susceptible      Vancomycin Susceptible                    Linear View                   Comprehensive Metabolic Panel [[id number]]  (Abnormal) Collected: 06/06/22 0658    Specimen: Blood Updated: 06/06/22 0738     Glucose 126 mg/dL      BUN 13 mg/dL      Creatinine 0.72 mg/dL      Sodium 135 mmol/L      Potassium 4.2 mmol/L      Chloride 101 mmol/L      CO2 31.0 mmol/L      Calcium 8.6 mg/dL      Total Protein 7.3 g/dL      Albumin 2.60 g/dL      ALT (SGPT) 12 U/L      AST (SGOT) 18 U/L      Alkaline Phosphatase 110 U/L      Total Bilirubin 0.2 mg/dL      Globulin 4.7 gm/dL      A/G Ratio 0.6 g/dL      BUN/Creatinine Ratio 18.1     Anion Gap 3.0 mmol/L      eGFR 91.2 mL/min/1.73      Comment: National Kidney Foundation and American Society of Nephrology (ASN) Task Force recommended calculation based on the Chronic Kidney Disease Epidemiology Collaboration (CKD-EPI) equation refit without adjustment for race.       Narrative:      GFR Normal >60  Chronic Kidney Disease <60  Kidney Failure  <15      CBC (No Diff) [[id number]]  (Abnormal) Collected: 06/06/22 0658    Specimen: Blood Updated: 06/06/22 0724     WBC 4.74 10*3/mm3      RBC 3.26 10*6/mm3      Hemoglobin 8.6 g/dL      Hematocrit 28.9 %      MCV 88.7 fL      MCH 26.4 pg      MCHC 29.8 g/dL      RDW 15.8 %      RDW-SD 51.6 fl      MPV 9.1 fL      Platelets 220 10*3/mm3     Vancomycin, Trough Please collect 30-60 minutes prior to dose due 06/05/22 at 1700 [[id number]]  (Abnormal) Collected: 06/05/22 1604    Specimen: Blood Updated: 06/05/22 1628     Vancomycin Trough 21.30 mcg/mL         Imaging Results (Last 72 Hours)     Procedure Component Value Units Date/Time    XR Abdomen KUB [[id number]] Collected: 06/07/22 1328     Updated: 06/07/22 1335    Narrative:      XR ABDOMEN KUB- 6/7/2022 12:55 PM CDT     HISTORY: abdominal pain; constipation; L02.415-Cutaneous abscess of  right lower limb; Z74.09-Other reduced mobility       COMPARISON: None     FINDINGS:  There is a nonspecific bowel gas pattern. No soft tissue mass or  pathologic calcification  is visualized.     Postsurgical change from instrumented fusion L3 L4 L5. Screws are  present in the pedicles. Interbody disc space devices are present at the  L3-L4 and L4-L5 levels.     Degenerative disc disease is noted with sclerosis and hypertrophic  degenerative change L1-L2 level.     Evidence of kyphoplasty at T12 is noted..        Impression:      1. Degenerative spondylosis and postsurgical changes noted in the lumbar  spine.  2. The bowel gas pattern is nonspecific.         This report was finalized on 06/07/2022 13:32 by Dr. Chai Francisco MD.             ASSESSMENT/PLAN       Examination and evaluation of wound(s) was performed.    DIAGNOSIS:   New: Mental status change    Pressure injury of right lower leg, stage 3  Impaired mobility  Abscess, right hip  Chronic embolism and thrombosis of unspecified deep veins of left lower extremity  Rheumatoid arthritis involving multiple  sites        PLAN:   New onset mental status change was noted.  Discussed this with RN and RN is alerting Dr. Chong.  RN states she seemed to be slightly confused this morning but her current status is a more significant change.    Removed Unna boots due to decreased edema and no longer fitting for compression.  Will hold Unna boots at this time.  This was discussed with physical therapy.    Placed following orders for updated wound care plan:    Start     Ordered    06/07/22 1447  Wound Care  Every 12 Hours        Question Answer Comment   Wound Locations Right lateral distal lower leg    Wound Care Instructions Clean wound with normal saline.  Apply Opticell AG to wound bed.  Cover with gauze and wrap with Kerlix.    Cleanse Normal Saline    Intervention Other    Other Opticell AG    Dressing: Gauze Roll        06/07/22 1446    06/07/22 1446  Elevate Heels Off of Bed  Until Discontinued         06/07/22 1446    06/07/22 1446  Use Repositioning Wedge to Position Patient  Continuous         06/07/22 1446    06/04/22 0400  Vital Signs  Every 4 Hours       06/04/22 0234    Unscheduled  Apply Moisture Barrier After Any Incontinence  As Needed      Comments: Calazime barrier cream   Question:  Wound Care Instructions  Answer:  Apply Moisture Barrier After Any Incontinence    06/07/22 1446                  Discussed findings and treatment plan including risks, benefits, and treatment options with patient in detail. Patient agreed with treatment plan.      This document has been electronically signed by DANIELA Mullins on 6/7/2022 14:22 CDT       Time spent in face-to-face evaluation, chart review, planning and education 45 minutes with greater than 50% of time spent with patient and/or family and in coordination of care.  Counseling of patient and/or family includes discussing wound diagnosis and etiology , discussing risks and benefits, Importance of compliance with chosen management options and patient/family  education.

## 2022-06-07 NOTE — PLAN OF CARE
Goal Outcome Evaluation:  Plan of Care Reviewed With: patient        Progress: no change  Outcome Evaluation: BP elevated, yet pt denies need for prn pain medication, turned/repositioned. Wound vac drsg c/d/i, appliance functioning. No c/o nausea. Resting most of shift w/eyes closed. Horan to bsd, care done. Safety maintained.

## 2022-06-07 NOTE — PROGRESS NOTES
Infectious Diseases Progress Note    Patient:  Tawny Shin  YOB: 1955  MRN: [id number]   Admit date: 6/3/2022   Admitting Physician: Butch Chong MD  Primary Care Physician: Leta Julian DO    Chief Complaint/Interval History: She feels okay.  No new problems.  Has wound VAC in place right hip area.    Intake/Output Summary (Last 24 hours) at 6/7/2022 1219  Last data filed at 6/7/2022 1030  Gross per 24 hour   Intake 827 ml   Output 2400 ml   Net -1573 ml     Allergies:   Allergies   Allergen Reactions   • Atorvastatin Other (See Comments)     LEG CRAMPS     • Amoxicillin Rash   • Escitalopram Rash   • Nabumetone Rash   • Niacin Er Rash   • Penicillin G Rash   • Penicillins Rash   • Simvastatin Rash     Current Scheduled Medications:   apixaban, 5 mg, Oral, Q12H  ascorbic acid, 500 mg, Oral, Daily  aspirin, 81 mg, Oral, Daily  bisacodyl, 10 mg, Rectal, Daily  carvedilol, 25 mg, Oral, BID With Meals  cefepime, 2 g, Intravenous, Q8H  collagenase, 1 application, Topical, Q24H  docusate sodium, 100 mg, Oral, BID  DULoxetine, 60 mg, Oral, Daily  famotidine, 40 mg, Oral, Daily  ferrous gluconate, 324 mg, Oral, Daily With Breakfast  fluticasone, 1 spray, Nasal, Daily  furosemide, 40 mg, Oral, Daily  isosorbide mononitrate, 60 mg, Oral, Daily  levothyroxine, 75 mcg, Oral, Q AM  lidocaine, 1 patch, Transdermal, Q24H  [START ON 6/8/2022] lisinopril, 10 mg, Oral, Q24H  magnesium citrate, 150 mL, Oral, Once  magnesium oxide, 400 mg, Oral, Daily  Menthol-Zinc Oxide, , Topical, BID  methylnaltrexone, 12 mg, Subcutaneous, Every Other Day  multivitamin with minerals, 1 tablet, Oral, Daily  polyethylene glycol, 17 g, Oral, Daily  saccharomyces boulardii, 250 mg, Oral, Daily  sennosides-docusate, 2 tablet, Oral, BID  sodium chloride, 10 mL, Intravenous, Q12H  sucralfate, 1 g, Oral, 4x Daily AC & at Bedtime  thiamine, 100 mg, Oral, Daily  valACYclovir, 500 mg, Oral, Daily      Current PRN  "Medications:  •  acetaminophen  •  Diclofenac Sodium  •  droperidol  •  HYDROcodone-acetaminophen  •  hydrocortisone-bacitracin-zinc oxide-nystatin  •  magnesium hydroxide  •  nitroglycerin  •  ondansetron **OR** ondansetron  •  [COMPLETED] Insert peripheral IV **AND** sodium chloride  •  sodium chloride  •  traMADol    Review of Systems see HPI    Vital Signs:  /60 (BP Location: Right arm, Patient Position: Lying)   Pulse 61   Temp 97.9 °F (36.6 °C) (Oral)   Resp 18   Ht 167.6 cm (66\")   Wt 81.1 kg (178 lb 12.7 oz)   LMP  (LMP Unknown)   SpO2 94%   BMI 28.86 kg/m²     Physical Exam  Vital signs - reviewed.  Line/IV (peripheral) site - No erythema, warmth, induration, or tenderness.  Wound VAC in place right hip  No significant surrounding erythema  Skin without drug rash    Lab Results:  CBC:   Results from last 7 days   Lab Units 06/06/22  0658 06/04/22  1111 06/03/22  2229   WBC 10*3/mm3 4.74 5.21 7.76   HEMOGLOBIN g/dL 8.6* 7.9* 8.4*   HEMATOCRIT % 28.9* 26.3* 27.1*   PLATELETS 10*3/mm3 220 222 241     BMP:  Results from last 7 days   Lab Units 06/06/22  0658 06/04/22  1111 06/03/22  2229   SODIUM mmol/L 135* 138 138   POTASSIUM mmol/L 4.2 3.7 3.7   CHLORIDE mmol/L 101 102 101   CO2 mmol/L 31.0* 27.0 28.0   BUN mg/dL 13 15 18   CREATININE mg/dL 0.72 0.80 0.84   GLUCOSE mg/dL 126* 139* 113*   CALCIUM mg/dL 8.6 8.5* 8.4*   ALT (SGPT) U/L 12 11 13     Culture Results:   Blood Culture   Date Value Ref Range Status   06/03/2022 No growth at 3 days  Preliminary   06/03/2022 No growth at 3 days  Preliminary     Urine Culture   Date Value Ref Range Status   06/04/2022 >100,000 CFU/mL Pseudomonas aeruginosa (A)  Final   06/04/2022 >100,000 CFU/mL Escherichia coli (A)  Final   Susceptibility     Pseudomonas aeruginosa Escherichia coli     LICHA LICHA     Ampicillin   >=32 ug/ml Resistant     Ampicillin + Sulbactam   8 ug/ml Susceptible     Cefazolin   <=4 ug/ml Susceptible     Cefepime 2 ug/ml Susceptible <=1 " ug/ml Susceptible     Ceftazidime 4 ug/ml Susceptible <=1 ug/ml Susceptible     Ceftriaxone   <=1 ug/ml Susceptible     Ciprofloxacin <=0.25 ug/ml Susceptible       Gentamicin <=1 ug/ml Susceptible >=16 ug/ml Resistant     Levofloxacin 1 ug/ml Susceptible >=8 ug/ml Resistant     Nitrofurantoin   <=16 ug/ml Susceptible     Piperacillin + Tazobactam <=4 ug/ml Susceptible <=4 ug/ml Susceptible     Tobramycin   4 ug/ml Susceptible     Trimethoprim + Sulfamethoxazole   >=320 ug/ml Resistant            Wound Culture   Date Value Ref Range Status   06/04/2022 Scant growth (1+) Staphylococcus aureus (A)  Final   06/04/2022 Light growth (2+) Staphylococcus aureus (A)  Final     Staphylococcus aureus       LICHA     Clindamycin >=4 ug/ml Resistant     Erythromycin >=8 ug/ml Resistant     Inducible Clindamycin Resistance NEG ug/ml Negative     Oxacillin 0.5 ug/ml Susceptible     Rifampin <=0.5 ug/ml Susceptible     Tetracycline >=16 ug/ml Resistant     Trimethoprim + Sulfamethoxazole <=10 ug/ml Susceptible     Vancomycin <=0.5 ug/ml Susceptible        Radiology: None  Additional Studies Reviewed: None    Impression:   1.  Abscess right hip-improving-MSSA  2.  Urinary tract infection-Pseudomonas and E. coli    Recommendations:   Continue cefepime 2 g IV every 8 hours for 3 more days to manage both right hip abscess and UTI  Then transition to cefazolin 2 g IV every 8 hours to continue management for right hip abscess  Anticipating completing a 2 to 4-week course of IV antibiotic treatment for the right hip      Elie Ohara MD

## 2022-06-07 NOTE — PROGRESS NOTES
Magda Salcido MD FACS  Progress Note     LOS: 3 days   Patient Care Team:  Leta Julian DO as PCP - General (Family Medicine)  Moises Holman MD as Consulting Physician (Otolaryngology)  Christiano Rao PA as Physician Assistant (Otolaryngology)  Candelaria David MD as Obstetrician (Obstetrics and Gynecology)  Omar Hernandez MD as Cardiologist (Cardiology)  Leta Crawford APRN as Nurse Practitioner (Nurse Practitioner)      Subjective     Interval History:      eating breakfast.  Denies complaint.     Objective     Vital Signs  Temp:  [97.9 °F (36.6 °C)-98.6 °F (37 °C)] 97.9 °F (36.6 °C)  Heart Rate:  [60-72] 61  Resp:  [16-18] 18  BP: (153-175)/(60-91) 153/60    Physical Exam:  General appearance - alert, well appearing, and in no distress  Extremities - vac in place      Results Review:    Lab Results (last 24 hours)     Procedure Component Value Units Date/Time    Blood Culture - Blood, Arm, Right [[id number]]  (Normal) Collected: 06/03/22 2229    Specimen: Blood from Arm, Right Updated: 06/06/22 2247     Blood Culture No growth at 3 days    Blood Culture - Blood, Arm, Right [[id number]]  (Normal) Collected: 06/03/22 2229    Specimen: Blood from Arm, Right Updated: 06/06/22 2247     Blood Culture No growth at 3 days    Urine Culture - Urine, Urine, Clean Catch [[id number]]  (Abnormal)  (Susceptibility) Collected: 06/04/22 0130    Specimen: Urine, Clean Catch Updated: 06/06/22 1057     Urine Culture >100,000 CFU/mL Pseudomonas aeruginosa      >100,000 CFU/mL Escherichia coli    Narrative:      Colonization of the urinary tract without infection is common. Treatment is discouraged unless the patient is symptomatic, pregnant, or undergoing an invasive urologic procedure.    Susceptibility      Pseudomonas aeruginosa      LICHA      Cefepime Susceptible      Ceftazidime Susceptible      Ciprofloxacin Susceptible      Gentamicin Susceptible      Levofloxacin Susceptible      Piperacillin +  Tazobactam Susceptible                    Linear View               Susceptibility      Escherichia coli      LICHA      Ampicillin Resistant     Ampicillin + Sulbactam Susceptible      Cefazolin Susceptible      Cefepime Susceptible      Ceftazidime Susceptible      Ceftriaxone Susceptible      Gentamicin Resistant     Levofloxacin Resistant     Nitrofurantoin Susceptible      Piperacillin + Tazobactam Susceptible      Tobramycin Susceptible      Trimethoprim + Sulfamethoxazole Resistant                   Linear View                   Tissue / Bone Culture - Tissue, Hip, Right [[id number]]  (Abnormal) Collected: 06/04/22 0834    Specimen: Tissue from Hip, Right Updated: 06/06/22 0942     Tissue Culture Light growth (2+) Staphylococcus aureus     Gram Stain Many (4+) WBCs seen      Few (2+) Gram positive cocci    Narrative:      Refer to previous blood culture collected on 6/4/2022 0428 for MICs      Wound Culture - Wound, Hip, Right [[id number]]  (Abnormal) Collected: 06/04/22 0834    Specimen: Wound from Hip, Right Updated: 06/06/22 0942     Wound Culture Scant growth (1+) Staphylococcus aureus     Gram Stain Moderate (3+) WBCs seen      Few (2+) Gram positive cocci    Narrative:      Refer to previous blood culture collected on 6/4/2022 0428 for MICs    Wound Culture - Wound, Hip [[id number]]  (Abnormal)  (Susceptibility) Collected: 06/04/22 0328    Specimen: Wound from Hip Updated: 06/06/22 0941     Wound Culture Light growth (2+) Staphylococcus aureus     Gram Stain Moderate (3+) WBCs seen      Few (2+) Gram positive cocci, intracellular and extracellular    Susceptibility      Staphylococcus aureus      LICHA      Clindamycin Resistant     Erythromycin Resistant     Inducible Clindamycin Resistance Negative      Oxacillin Susceptible      Rifampin Susceptible      Tetracycline Resistant     Trimethoprim + Sulfamethoxazole Susceptible      Vancomycin Susceptible                    Linear View                        Imaging Results (Last 24 Hours)     ** No results found for the last 24 hours. **            Assessment & Plan       POD# 3 I and D R hip    Continue vac coverage.  Ok to return to NH from surgical standpoint      Magda Salcido MD  06/07/22  09:00 CDT

## 2022-06-07 NOTE — PLAN OF CARE
Goal Outcome Evaluation:  Plan of Care Reviewed With: patient        Progress: improving  Outcome Evaluation: PT tx completed. W/v to L hip intact. Pt able to complete LLE AROM x15 reps, AAROM to LLE x15 reps. Completed B quad and glute sets x10 reps witih cues for technique. Attempted bridging, assisted with BLE support, unable to clear bed at this time. Able to roll L and R with mod x1. Will cont to follow. Recommend d/c back to SNF.

## 2022-06-07 NOTE — PROGRESS NOTES
"    [facility] Medicine Services  INPATIENT PROGRESS NOTE    Patient Name: Tawny Shin  Date of Admission: 6/3/2022  Today's Date: 06/07/22  Length of Stay: 3  Primary Care Physician: Leta Julian DO    Subjective   Chief Complaint: Nausea  HPI   Patient states that when she woke up this morning she felt good, but is not feeling as well as the day has progressed.  She describes some nausea in addition to abdominal discomfort.  She initially could not localize her abdominal discomfort, basically stating that it was more generalized in nature.  She still has not had a bowel movement.  She did state that after she ate some breakfast this morning her symptoms did seem to get worse as well.  \"I just want to feel better.\"    Review of Systems     All pertinent negatives and positives are as above. All other systems have been reviewed and are negative unless otherwise stated.     Objective    Temp:  [97.9 °F (36.6 °C)-98.6 °F (37 °C)] 97.9 °F (36.6 °C)  Heart Rate:  [60-72] 61  Resp:  [16-18] 18  BP: (153-175)/(60-91) 153/60  Physical Exam  Vitals reviewed.   Constitutional:       Appearance: She is ill-appearing.   HENT:      Head: Normocephalic.      Mouth/Throat:      Pharynx: No oropharyngeal exudate.   Eyes:      Pupils: Pupils are equal, round, and reactive to light.   Pulmonary:      Effort: Pulmonary effort is normal. No respiratory distress.   Abdominal:      General: Bowel sounds are normal.      Palpations: Abdomen is soft.      Tenderness: There is abdominal tenderness (RUQ).   Genitourinary:     Comments: Chronic kay; normal appearing urine  Musculoskeletal:      Cervical back: Neck supple.      Comments: Wound vac in place right hip   Neurological:      Mental Status: She is alert.      Motor: Weakness present.   Psychiatric:         Mood and Affect: Mood normal.         Results Review:  I have reviewed the labs, radiology results, and diagnostic " studies.    Laboratory Data:   Results from last 7 days   Lab Units 06/06/22  0658 06/04/22  1111 06/03/22  2229   WBC 10*3/mm3 4.74 5.21 7.76   HEMOGLOBIN g/dL 8.6* 7.9* 8.4*   HEMATOCRIT % 28.9* 26.3* 27.1*   PLATELETS 10*3/mm3 220 222 241        Results from last 7 days   Lab Units 06/06/22  0658 06/04/22  1111 06/03/22  2229   SODIUM mmol/L 135* 138 138   POTASSIUM mmol/L 4.2 3.7 3.7   CHLORIDE mmol/L 101 102 101   CO2 mmol/L 31.0* 27.0 28.0   BUN mg/dL 13 15 18   CREATININE mg/dL 0.72 0.80 0.84   CALCIUM mg/dL 8.6 8.5* 8.4*   BILIRUBIN mg/dL 0.2 0.2 0.2   ALK PHOS U/L 110 114 129*   ALT (SGPT) U/L 12 11 13   AST (SGOT) U/L 18 16 18   GLUCOSE mg/dL 126* 139* 113*       Culture Data:   Blood Culture   Date Value Ref Range Status   06/03/2022 No growth at 2 days  Preliminary   06/03/2022 No growth at 2 days  Preliminary     Urine Culture   Date Value Ref Range Status   06/04/2022 >100,000 CFU/mL Pseudomonas aeruginosa (A)  Final   06/04/2022 >100,000 CFU/mL Escherichia coli (A)  Final     Wound Culture   Date Value Ref Range Status   06/04/2022 Scant growth (1+) Staphylococcus aureus (A)  Final   06/04/2022 Light growth (2+) Staphylococcus aureus (A)  Final       Radiology Data:   Imaging Results (Last 24 Hours)     ** No results found for the last 24 hours. **          I have reviewed the patient's current medications.     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  KUB  today  2.  RUQ US today -patient has been having symptoms of nausea, symptoms worse after eating, and does have some right upper quadrant tenderness on examination today.  3.  We will repeat a comprehensive metabolic profile to include LFTs with morning lab work tomorrow  4.  Wound culture as follows:    5.  Currently on monotherapy with Cefepime as not only should this cover MSSA, but will also cover the Pseudomonas and E. coli on her urine culture (see below):      6.  Wound vac in place  7.  Appreciate infectious disease input  8.  BP trend reviewed; will titrate lisinopril  9.  Eliquis resumed 6/6  10.  Bowel regimen -patient reports that it has been about a week since she last had a bowel movement; she also reports that is not uncommon for her.  Add dulcolax supp and magnesium citrate today  11.  Case discussed with Dr. Turner.  Plan for MRI Pelvis today as well.    Electronically signed by Butch Chong MD, 06/07/22, 11:41 CDT.

## 2022-06-07 NOTE — CASE MANAGEMENT/SOCIAL WORK
Continued Stay Note  [facility]     Patient Name: Tawny Shin  MRN: [id number]  Today's Date: 6/7/2022    Admit Date: 6/3/2022     Discharge Plan     Row Name 06/07/22 0858       Plan    Plan Dr SHELLI Salcido requesting wound vac set-up, insurance authorization form signed. Sw to follow with referral with [facility].               Discharge Codes    No documentation.               Expected Discharge Date and Time     Expected Discharge Date Expected Discharge Time    Jun 9, 2022             Belinda Campos RN

## 2022-06-08 ENCOUNTER — TELEPHONE (OUTPATIENT)
Dept: CARDIOLOGY | Facility: CLINIC | Age: 67
End: 2022-06-08

## 2022-06-08 ENCOUNTER — APPOINTMENT (OUTPATIENT)
Dept: ULTRASOUND IMAGING | Facility: HOSPITAL | Age: 67
End: 2022-06-08

## 2022-06-08 ENCOUNTER — APPOINTMENT (OUTPATIENT)
Dept: MRI IMAGING | Facility: HOSPITAL | Age: 67
End: 2022-06-08

## 2022-06-08 PROBLEM — K80.20 CHOLELITHIASIS: Status: ACTIVE | Noted: 2022-06-08

## 2022-06-08 LAB
ALBUMIN SERPL-MCNC: 2.4 G/DL (ref 3.5–5.2)
ALBUMIN/GLOB SERPL: 0.5 G/DL
ALP SERPL-CCNC: 96 U/L (ref 39–117)
ALT SERPL W P-5'-P-CCNC: 10 U/L (ref 1–33)
ANION GAP SERPL CALCULATED.3IONS-SCNC: 3 MMOL/L (ref 5–15)
AST SERPL-CCNC: 14 U/L (ref 1–32)
BACTERIA SPEC AEROBE CULT: NORMAL
BACTERIA SPEC AEROBE CULT: NORMAL
BILIRUB SERPL-MCNC: 0.3 MG/DL (ref 0–1.2)
BUN SERPL-MCNC: 11 MG/DL (ref 8–23)
BUN/CREAT SERPL: 17.2 (ref 7–25)
CALCIUM SPEC-SCNC: 8.6 MG/DL (ref 8.6–10.5)
CHLORIDE SERPL-SCNC: 104 MMOL/L (ref 98–107)
CO2 SERPL-SCNC: 31 MMOL/L (ref 22–29)
CREAT SERPL-MCNC: 0.64 MG/DL (ref 0.57–1)
DEPRECATED RDW RBC AUTO: 51 FL (ref 37–54)
EGFRCR SERPLBLD CKD-EPI 2021: 96.4 ML/MIN/1.73
ERYTHROCYTE [DISTWIDTH] IN BLOOD BY AUTOMATED COUNT: 16.1 % (ref 12.3–15.4)
GLOBULIN UR ELPH-MCNC: 4.4 GM/DL
GLUCOSE SERPL-MCNC: 99 MG/DL (ref 65–99)
HCT VFR BLD AUTO: 27.8 % (ref 34–46.6)
HGB BLD-MCNC: 8.4 G/DL (ref 12–15.9)
MCH RBC QN AUTO: 26.3 PG (ref 26.6–33)
MCHC RBC AUTO-ENTMCNC: 30.2 G/DL (ref 31.5–35.7)
MCV RBC AUTO: 87.1 FL (ref 79–97)
PLATELET # BLD AUTO: 225 10*3/MM3 (ref 140–450)
PMV BLD AUTO: 9.1 FL (ref 6–12)
POTASSIUM SERPL-SCNC: 3.6 MMOL/L (ref 3.5–5.2)
PROT SERPL-MCNC: 6.8 G/DL (ref 6–8.5)
RBC # BLD AUTO: 3.19 10*6/MM3 (ref 3.77–5.28)
SODIUM SERPL-SCNC: 138 MMOL/L (ref 136–145)
WBC NRBC COR # BLD: 4.39 10*3/MM3 (ref 3.4–10.8)

## 2022-06-08 PROCEDURE — 0 GADOBENATE DIMEGLUMINE 529 MG/ML SOLUTION: Performed by: INTERNAL MEDICINE

## 2022-06-08 PROCEDURE — 99024 POSTOP FOLLOW-UP VISIT: CPT | Performed by: SPECIALIST

## 2022-06-08 PROCEDURE — 76705 ECHO EXAM OF ABDOMEN: CPT

## 2022-06-08 PROCEDURE — 25010000002 METHYLNALTREXONE 12 MG/0.6ML SOLUTION: Performed by: SPECIALIST

## 2022-06-08 PROCEDURE — 25010000002 CEFEPIME PER 500 MG: Performed by: SPECIALIST

## 2022-06-08 PROCEDURE — 72197 MRI PELVIS W/O & W/DYE: CPT

## 2022-06-08 PROCEDURE — 85027 COMPLETE CBC AUTOMATED: CPT | Performed by: INTERNAL MEDICINE

## 2022-06-08 PROCEDURE — 80053 COMPREHEN METABOLIC PANEL: CPT | Performed by: INTERNAL MEDICINE

## 2022-06-08 PROCEDURE — A9577 INJ MULTIHANCE: HCPCS | Performed by: INTERNAL MEDICINE

## 2022-06-08 RX ADMIN — Medication 324 MG: at 09:35

## 2022-06-08 RX ADMIN — Medication 250 MG: at 09:35

## 2022-06-08 RX ADMIN — FAMOTIDINE 40 MG: 20 TABLET, FILM COATED ORAL at 09:36

## 2022-06-08 RX ADMIN — THIAMINE HCL TAB 100 MG 100 MG: 100 TAB at 09:40

## 2022-06-08 RX ADMIN — DOCUSATE SODIUM 50 MG AND SENNOSIDES 8.6 MG 2 TABLET: 8.6; 5 TABLET, FILM COATED ORAL at 09:36

## 2022-06-08 RX ADMIN — OXYCODONE HYDROCHLORIDE AND ACETAMINOPHEN 500 MG: 500 TABLET ORAL at 09:37

## 2022-06-08 RX ADMIN — Medication 10 ML: at 20:55

## 2022-06-08 RX ADMIN — ISOSORBIDE MONONITRATE 60 MG: 60 TABLET, EXTENDED RELEASE ORAL at 09:36

## 2022-06-08 RX ADMIN — APIXABAN 5 MG: 5 TABLET, FILM COATED ORAL at 09:37

## 2022-06-08 RX ADMIN — FUROSEMIDE 40 MG: 40 TABLET ORAL at 09:36

## 2022-06-08 RX ADMIN — LISINOPRIL 10 MG: 10 TABLET ORAL at 09:36

## 2022-06-08 RX ADMIN — COLLAGENASE SANTYL 1 APPLICATION: 250 OINTMENT TOPICAL at 09:41

## 2022-06-08 RX ADMIN — APIXABAN 5 MG: 5 TABLET, FILM COATED ORAL at 20:54

## 2022-06-08 RX ADMIN — SUCRALFATE 1 G: 1 TABLET ORAL at 17:52

## 2022-06-08 RX ADMIN — GADOBENATE DIMEGLUMINE 16 ML: 529 INJECTION, SOLUTION INTRAVENOUS at 15:09

## 2022-06-08 RX ADMIN — SUCRALFATE 1 G: 1 TABLET ORAL at 20:55

## 2022-06-08 RX ADMIN — MAGNESIUM GLUCONATE 500 MG ORAL TABLET 400 MG: 500 TABLET ORAL at 09:36

## 2022-06-08 RX ADMIN — DOCUSATE SODIUM 50 MG AND SENNOSIDES 8.6 MG 2 TABLET: 8.6; 5 TABLET, FILM COATED ORAL at 20:55

## 2022-06-08 RX ADMIN — LEVOTHYROXINE SODIUM 75 MCG: 75 TABLET ORAL at 09:36

## 2022-06-08 RX ADMIN — CARVEDILOL 25 MG: 25 TABLET, FILM COATED ORAL at 09:36

## 2022-06-08 RX ADMIN — CEFEPIME 2 G: 2 INJECTION, POWDER, FOR SOLUTION INTRAVENOUS at 03:18

## 2022-06-08 RX ADMIN — Medication 1 TABLET: at 09:36

## 2022-06-08 RX ADMIN — METHYLNALTREXONE BROMIDE 12 MG: 12 INJECTION, SOLUTION SUBCUTANEOUS at 09:39

## 2022-06-08 RX ADMIN — DULOXETINE HYDROCHLORIDE 60 MG: 30 CAPSULE, DELAYED RELEASE ORAL at 09:36

## 2022-06-08 RX ADMIN — Medication 10 ML: at 09:37

## 2022-06-08 RX ADMIN — BISACODYL 10 MG: 10 SUPPOSITORY RECTAL at 16:47

## 2022-06-08 RX ADMIN — DOCUSATE SODIUM 100 MG: 100 CAPSULE, LIQUID FILLED ORAL at 20:54

## 2022-06-08 RX ADMIN — POLYETHYLENE GLYCOL 3350 17 G: 17 POWDER, FOR SOLUTION ORAL at 09:37

## 2022-06-08 RX ADMIN — ASPIRIN 81 MG: 81 TABLET, COATED ORAL at 09:37

## 2022-06-08 RX ADMIN — CEFEPIME 2 G: 2 INJECTION, POWDER, FOR SOLUTION INTRAVENOUS at 11:38

## 2022-06-08 RX ADMIN — CARVEDILOL 25 MG: 25 TABLET, FILM COATED ORAL at 18:26

## 2022-06-08 RX ADMIN — CEFEPIME 2 G: 2 INJECTION, POWDER, FOR SOLUTION INTRAVENOUS at 18:28

## 2022-06-08 RX ADMIN — SODIUM CHLORIDE, POTASSIUM CHLORIDE, SODIUM LACTATE AND CALCIUM CHLORIDE 50 ML/HR: 600; 310; 30; 20 INJECTION, SOLUTION INTRAVENOUS at 19:24

## 2022-06-08 RX ADMIN — DOCUSATE SODIUM 100 MG: 100 CAPSULE, LIQUID FILLED ORAL at 09:36

## 2022-06-08 RX ADMIN — VALACYCLOVIR HYDROCHLORIDE 500 MG: 500 TABLET, FILM COATED ORAL at 18:26

## 2022-06-08 RX ADMIN — HYDROCODONE BITARTRATE AND ACETAMINOPHEN 1 TABLET: 7.5; 325 TABLET ORAL at 09:37

## 2022-06-08 RX ADMIN — FLUTICASONE PROPIONATE 1 SPRAY: 50 SPRAY, METERED NASAL at 09:40

## 2022-06-08 NOTE — PROGRESS NOTES
"INFECTIOUS DISEASES PROGRESS NOTE    Patient:  Tawny Shin  YOB: 1955  MRN: [id number]   Admit date: 6/3/2022   Admitting Physician: Butch Chong MD  Primary Care Physician: Leta Julian DO    Chief Complaint: \"I feel awful\"        Interval History: The patient reports she just feels \"awful\".  When I asked her specifically what she meant she said, \"I do not know I just feel bad\" she denied nausea, vomiting, flulike feeling.    I asked her if she felt bad like it was her rheumatoid arthritis she said \"yes I guess\".    Sugar, RN at bedside and is just given her pain medicine.    Allergies:   Allergies   Allergen Reactions   • Atorvastatin Other (See Comments)     LEG CRAMPS     • Amoxicillin Rash   • Escitalopram Rash   • Nabumetone Rash   • Niacin Er Rash   • Penicillin G Rash   • Penicillins Rash   • Simvastatin Rash       Current Scheduled Medications:   apixaban, 5 mg, Oral, Q12H  ascorbic acid, 500 mg, Oral, Daily  aspirin, 81 mg, Oral, Daily  bisacodyl, 10 mg, Rectal, Daily  carvedilol, 25 mg, Oral, BID With Meals  cefepime, 2 g, Intravenous, Q8H  collagenase, 1 application, Topical, Q24H  docusate sodium, 100 mg, Oral, BID  DULoxetine, 60 mg, Oral, Daily  famotidine, 40 mg, Oral, Daily  ferrous gluconate, 324 mg, Oral, Daily With Breakfast  fluticasone, 1 spray, Nasal, Daily  furosemide, 40 mg, Oral, Daily  isosorbide mononitrate, 60 mg, Oral, Daily  levothyroxine, 75 mcg, Oral, Q AM  lidocaine, 1 patch, Transdermal, Q24H  lisinopril, 10 mg, Oral, Q24H  magnesium citrate, 150 mL, Oral, Once  magnesium oxide, 400 mg, Oral, Daily  methylnaltrexone, 12 mg, Subcutaneous, Every Other Day  multivitamin with minerals, 1 tablet, Oral, Daily  polyethylene glycol, 17 g, Oral, Daily  saccharomyces boulardii, 250 mg, Oral, Daily  sennosides-docusate, 2 tablet, Oral, BID  sodium chloride, 10 mL, Intravenous, Q12H  sucralfate, 1 g, Oral, 4x Daily AC & at Bedtime  thiamine, 100 mg, Oral, " "Daily  valACYclovir, 500 mg, Oral, Daily      Current PRN Medications:  •  acetaminophen  •  Diclofenac Sodium  •  droperidol  •  HYDROcodone-acetaminophen  •  hydrocortisone-bacitracin-zinc oxide-nystatin  •  magnesium hydroxide  •  nitroglycerin  •  ondansetron **OR** ondansetron  •  [COMPLETED] Insert peripheral IV **AND** sodium chloride  •  sodium chloride  •  traMADol    Review of Systems   Constitutional: Negative for fever.   Gastrointestinal: Negative for nausea.          Objective     Vital Signs:  Temp (24hrs), Av.3 °F (36.8 °C), Min:98.1 °F (36.7 °C), Max:98.8 °F (37.1 °C)      /57 (BP Location: Right arm, Patient Position: Lying)   Pulse 68   Temp 98.1 °F (36.7 °C) (Oral)   Resp 16   Ht 167.6 cm (66\")   Wt 81.1 kg (178 lb 12.7 oz)   LMP  (LMP Unknown)   SpO2 96%   BMI 28.86 kg/m²         Physical Exam  General: Patient's lying in bed in no acute respiratory distress.  SARAH Wooten and I scooted her up in bed.  HEENT: Sclera anicteric noninjected  Respiratory: Effort even and unlabored  Right hip dressing clean dry and intact  Neuro: Alert and oriented, speech clear  Psych: She was pleasant and cooperative with examination.      Results Review:    I reviewed the patient's new clinical results.    Lab Results:  CBC:   Lab Results   Lab 22  1111 22  0658 22  0700   WBC 7.76 5.21 4.74 4.39   HEMOGLOBIN 8.4* 7.9* 8.6* 8.4*   HEMATOCRIT 27.1* 26.3* 28.9* 27.8*   PLATELETS 241 222 220 225         CMP:   Lab Results   Lab 22  1111 22  0658 22  0700   SODIUM 138 135* 138   POTASSIUM 3.7 4.2 3.6   CHLORIDE 102 101 104   CO2 27.0 31.0* 31.0*   BUN 15 13 11   CREATININE 0.80 0.72 0.64   CALCIUM 8.5* 8.6 8.6   BILIRUBIN 0.2 0.2 0.3   ALK PHOS 114 110 96   ALT (SGPT) 11 12 10   AST (SGOT) 16 18 14   GLUCOSE 139* 126* 99       Estimated Creatinine Clearance: 90.3 mL/min (by C-G formula based on SCr of 0.64 mg/dL).    Culture Results:    Microbiology " Results (last 10 days)     Procedure Component Value - Date/Time    Wound Culture - Wound, Hip, Right [[id number]]  (Abnormal) Collected: 06/04/22 0834    Lab Status: Final result Specimen: Wound from Hip, Right Updated: 06/06/22 0942     Wound Culture Scant growth (1+) Staphylococcus aureus     Gram Stain Moderate (3+) WBCs seen      Few (2+) Gram positive cocci    Narrative:      Refer to previous blood culture collected on 6/4/2022 0428 for MICs    Tissue / Bone Culture - Tissue, Hip, Right [[id number]]  (Abnormal) Collected: 06/04/22 0834    Lab Status: Final result Specimen: Tissue from Hip, Right Updated: 06/06/22 0942     Tissue Culture Light growth (2+) Staphylococcus aureus     Gram Stain Many (4+) WBCs seen      Few (2+) Gram positive cocci    Narrative:      Refer to previous blood culture collected on 6/4/2022 0428 for MICs      Wound Culture - Wound, Hip [[id number]]  (Abnormal)  (Susceptibility) Collected: 06/04/22 0328    Lab Status: Final result Specimen: Wound from Hip Updated: 06/06/22 0941     Wound Culture Light growth (2+) Staphylococcus aureus     Gram Stain Moderate (3+) WBCs seen      Few (2+) Gram positive cocci, intracellular and extracellular    Susceptibility      Staphylococcus aureus      LICHA      Clindamycin Resistant     Erythromycin Resistant     Inducible Clindamycin Resistance Negative      Oxacillin Susceptible      Rifampin Susceptible      Tetracycline Resistant     Trimethoprim + Sulfamethoxazole Susceptible      Vancomycin Susceptible                           Urine Culture - Urine, Urine, Clean Catch [[id number]]  (Abnormal)  (Susceptibility) Collected: 06/04/22 0130    Lab Status: Final result Specimen: Urine, Clean Catch Updated: 06/06/22 1057     Urine Culture >100,000 CFU/mL Pseudomonas aeruginosa      >100,000 CFU/mL Escherichia coli    Narrative:      Colonization of the urinary tract without infection is common. Treatment is discouraged unless the patient is symptomatic,  pregnant, or undergoing an invasive urologic procedure.    Susceptibility      Pseudomonas aeruginosa      LICHA      Cefepime Susceptible      Ceftazidime Susceptible      Ciprofloxacin Susceptible      Gentamicin Susceptible      Levofloxacin Susceptible      Piperacillin + Tazobactam Susceptible                       Susceptibility      Escherichia coli      LICHA      Ampicillin Resistant     Ampicillin + Sulbactam Susceptible      Cefazolin Susceptible      Cefepime Susceptible      Ceftazidime Susceptible      Ceftriaxone Susceptible      Gentamicin Resistant     Levofloxacin Resistant     Nitrofurantoin Susceptible      Piperacillin + Tazobactam Susceptible      Tobramycin Susceptible      Trimethoprim + Sulfamethoxazole Resistant                          COVID PRE-OP / PRE-PROCEDURE SCREENING ORDER (NO ISOLATION) - Swab, Nasal Cavity [[id number]]  (Normal) Collected: 06/04/22 0126    Lab Status: Final result Specimen: Swab from Nasal Cavity Updated: 06/04/22 0208    Narrative:      The following orders were created for panel order COVID PRE-OP / PRE-PROCEDURE SCREENING ORDER (NO ISOLATION) - Swab, Nasal Cavity.  Procedure                               Abnormality         Status                     ---------                               -----------         ------                     COVID-19,[facility]...[[id number]]  Normal              Final result                 Please view results for these tests on the individual orders.    COVID-19,[facility] IN-HOUSE,Nasal Swab No Transport Media 3-4 HR TAT - Swab, Nasal Cavity [[id number]]  (Normal) Collected: 06/04/22 0126    Lab Status: Final result Specimen: Swab from Nasal Cavity Updated: 06/04/22 0208     COVID19 Not Detected    Narrative:      Fact sheet for providers: https://www.fda.gov/media/736166/download     Fact sheet for patients: https://www.fda.gov/media/365630/download    Test performed by PCR.    Consider negative results in combination with clinical  observations, patient history, and epidemiological information.    Blood Culture - Blood, Arm, Right [[id number]]  (Normal) Collected: 06/03/22 2229    Lab Status: Preliminary result Specimen: Blood from Arm, Right Updated: 06/07/22 2247     Blood Culture No growth at 4 days    Blood Culture - Blood, Arm, Right [[id number]]  (Normal) Collected: 06/03/22 2229    Lab Status: Preliminary result Specimen: Blood from Arm, Right Updated: 06/07/22 2247     Blood Culture No growth at 4 days               Radiology:   Imaging Results (Last 72 Hours)     Procedure Component Value Units Date/Time    XR Abdomen KUB [[id number]] Collected: 06/07/22 1328     Updated: 06/07/22 1335    Narrative:      XR ABDOMEN KUB- 6/7/2022 12:55 PM CDT     HISTORY: abdominal pain; constipation; L02.415-Cutaneous abscess of  right lower limb; Z74.09-Other reduced mobility       COMPARISON: None     FINDINGS:  There is a nonspecific bowel gas pattern. No soft tissue mass or  pathologic calcification  is visualized.     Postsurgical change from instrumented fusion L3 L4 L5. Screws are  present in the pedicles. Interbody disc space devices are present at the  L3-L4 and L4-L5 levels.     Degenerative disc disease is noted with sclerosis and hypertrophic  degenerative change L1-L2 level.     Evidence of kyphoplasty at T12 is noted..        Impression:      1. Degenerative spondylosis and postsurgical changes noted in the lumbar  spine.  2. The bowel gas pattern is nonspecific.         This report was finalized on 06/07/2022 13:32 by Dr. Chai Francisco MD.          Assessment & Plan     Active Hospital Problems    Diagnosis    • **Abscess, right hip    • Anemia of chronic disease    • Pressure injury of skin of heel    • Chronic pain    • Chronic indwelling Horan catheter    • Abscess of right hip      Added automatically from request for surgery [id number]     • Essential hypertension    • Rheumatoid arthritis involving multiple sites (HCC)    • Functional  neurological symptom disorder with weakness or paralysis    • Chronic anticoagulation    • Chronic embolism and thrombosis of unspecified deep veins of left lower extremity (HCC)        IMPRESSION:  Abscess right hip status postevacuation per Dr. Magda Salcido.  Concern for deeper infection.  Urinary tract infection with culture positive for Pseudomonas and E. coli  Rheumatoid arthritis    RECOMMENDATION:   · Continue cefepime.  Current plan is to utilize cefepime to cover both the methicillin susceptible Staph aureus as well as Pseudomonas and E. coli for 2 more days then transition to cefazolin for ongoing treatment for methicillin susceptible Staph aureus  · Follow-up on MRI of pelvis findings-agree with Dr. Turner concern for deeper process        Maggie Bang MD  06/08/22  10:35 CDT

## 2022-06-08 NOTE — PROGRESS NOTES
"    [facility] Medicine Services  INPATIENT PROGRESS NOTE    Patient Name: Tawny Shin  Date of Admission: 6/3/2022  Today's Date: 06/08/22  Length of Stay: 4  Primary Care Physician: Leta Julian DO    Subjective   Chief Complaint: \"How did my tests turn out\"  Hip Pain        Patient states that she does not feel very good.  She inquires as to the results of her tests.  She can articulate the confusion that she experienced yesterday.  From that standpoint, she feels like her mind is much more clear today.  In spite of not having hardly anything to eat or drink today, she states that she really is not hungry.  She also does not have any symptoms of nausea currently.  She would like to try something to eat.  She denies any new pain symptoms, including pain in her hip.    Nursing contacted me earlier about potentially changing her Kay catheter, as we cannot validate with certainty the last time it was changed.  Order was placed to change Kay catheter.    Review of Systems     All pertinent negatives and positives are as above. All other systems have been reviewed and are negative unless otherwise stated.     Objective    Temp:  [98.1 °F (36.7 °C)-98.8 °F (37.1 °C)] 98.5 °F (36.9 °C)  Heart Rate:  [60-83] 65  Resp:  [14-18] 16  BP: (127-159)/(49-73) 129/49  Physical Exam  Vitals reviewed.   Constitutional:       Appearance: She is ill-appearing. She is not toxic-appearing.   HENT:      Head: Normocephalic.      Mouth/Throat:      Mouth: Mucous membranes are moist.      Pharynx: No oropharyngeal exudate.   Eyes:      Pupils: Pupils are equal, round, and reactive to light.   Pulmonary:      Effort: Pulmonary effort is normal. No respiratory distress.   Abdominal:      Palpations: Abdomen is soft.      Tenderness: There is abdominal tenderness (RUQ).   Genitourinary:     Comments: Chronic kay; normal appearing urine  Musculoskeletal:      Cervical back: Neck supple.      " Comments: Wound vac in place right hip   Neurological:      General: No focal deficit present.      Mental Status: She is alert.      Motor: Weakness present.      Comments: Much more alert.  Less confusion as compared to when I evaluated her yesterday.  No focal deficits appreciated.   Psychiatric:         Mood and Affect: Mood normal.         Results Review:  I have reviewed the labs, radiology results, and diagnostic studies.    Laboratory Data:   Results from last 7 days   Lab Units 06/08/22  0700 06/06/22  0658 06/04/22  1111   WBC 10*3/mm3 4.39 4.74 5.21   HEMOGLOBIN g/dL 8.4* 8.6* 7.9*   HEMATOCRIT % 27.8* 28.9* 26.3*   PLATELETS 10*3/mm3 225 220 222        Results from last 7 days   Lab Units 06/08/22  0700 06/06/22  0658 06/04/22  1111   SODIUM mmol/L 138 135* 138   POTASSIUM mmol/L 3.6 4.2 3.7   CHLORIDE mmol/L 104 101 102   CO2 mmol/L 31.0* 31.0* 27.0   BUN mg/dL 11 13 15   CREATININE mg/dL 0.64 0.72 0.80   CALCIUM mg/dL 8.6 8.6 8.5*   BILIRUBIN mg/dL 0.3 0.2 0.2   ALK PHOS U/L 96 110 114   ALT (SGPT) U/L 10 12 11   AST (SGOT) U/L 14 18 16   GLUCOSE mg/dL 99 126* 139*       Culture Data:   Blood Culture   Date Value Ref Range Status   06/03/2022 No growth at 2 days  Preliminary   06/03/2022 No growth at 2 days  Preliminary     Urine Culture   Date Value Ref Range Status   06/04/2022 >100,000 CFU/mL Pseudomonas aeruginosa (A)  Final   06/04/2022 >100,000 CFU/mL Escherichia coli (A)  Final     Wound Culture   Date Value Ref Range Status   06/04/2022 Scant growth (1+) Staphylococcus aureus (A)  Final   06/04/2022 Light growth (2+) Staphylococcus aureus (A)  Final       Radiology Data:   Imaging Results (Last 24 Hours)     Procedure Component Value Units Date/Time    US Gallbladder [[id number]] Collected: 06/08/22 1507     Updated: 06/08/22 1515    Narrative:      ULTRASOUND GALLBLADDER 6/8/2022 2:12 PM CDT     REASON FOR EXAM: pain, nausea       COMPARISON: NONE      TECHNIQUE: Multiple longitudinal and  transverse realtime sonographic  images of the right upper quadrant of the abdomen are obtained.      FINDINGS:    Pancreas: Normal in size and echogenicity.      Liver: Normal in size, echogenicity and echotexture. Hepatopedal flow is  present in the portal vein. No focal lesion.      Gallbladder:   Negative gallbladder and 9 mm acoustic reflection with distal shadowing  is present. This is a calculus is in the neck of the gallbladder.     Bile ducts: The CBD measures 0.78 cm in diameter. There is no  intrahepatic or extrahepatic ductal dilation.      Right kidney: Normal in size, shape and contour. The right renal contour  is 5.4 x 4.8 x 11 cm. No mass, hydronephrosis or calculi. No perinephric  fluid collection.       Other: The visualized abdominal aorta is normal in caliber.        Impression:      9 mm calculus in the neck of the gallbladder.        This report was finalized on 06/08/2022 15:12 by Dr. Chai Francisco MD.    MRI Pelvis With & Without Contrast [[id number]] Resulted: 06/08/22 1701     Updated: 06/08/22 1510          I have reviewed the patient's current medications.     Assessment/Plan     Active Hospital Problems    Diagnosis    • **Abscess, right hip    • Cholelithiasis    • Anemia of chronic disease    • Pressure injury of skin of heel    • Chronic pain    • Chronic indwelling Horan catheter    • Abscess of right hip      Added automatically from request for surgery [id number]     • Essential hypertension    • Rheumatoid arthritis involving multiple sites (HCC)    • Functional neurological symptom disorder with weakness or paralysis    • Chronic anticoagulation    • Chronic embolism and thrombosis of unspecified deep veins of left lower extremity (HCC)        Plan:  1.  MRI of the pelvis is pending  2.  Results of gallbladder ultrasound reviewed  3.  Her comprehensive metabolic profile and liver function tests are unremarkable.  Given her recent symptoms of nausea, associated with some right upper  quadrant discomfort, in conjunction with this gallbladder ultrasound, will monitor closely for symptomatic cholelithiasis and biliary colic.  I am going to start her on a low-fat, bland diet and monitor her response closely.  If these symptoms recur will ask Dr. Salcido with general surgery, who has been on the case, to assess.  Will repeat LFTs in AM tomorrow  4.  Wound culture as follows:    5.  Currently on monotherapy with Cefepime as not only should this cover MSSA, but will also cover the Pseudomonas and E. coli on her urine culture (see below):      6.  Wound vac in place  7.  Appreciate consultants  8.  Eliquis resumed 6/6  9.  We cannot verify with the skilled nursing facility when the last time her Horan catheter was changed; Horan catheter will be changed today.    Electronically signed by Butch Chong MD, 06/08/22, 17:18 CDT.

## 2022-06-08 NOTE — PLAN OF CARE
Problem: Adult Inpatient Plan of Care  Goal: Plan of Care Review  6/8/2022 1602 by Stephany Alejandro  Outcome: Ongoing, Progressing  Flowsheets  Taken 6/8/2022 1602  Progress: no change  Plan of Care Reviewed With:   patient   family  Taken 6/8/2022 1979  Outcome Evaluation: Ntn follow up. Oral intake 33% of three meals. Await diet advancement. Pt has Boost Plus BID. Cont to follow for plan of care.

## 2022-06-08 NOTE — PROGRESS NOTES
As the patient's established surgeon, Dr. Turner, has returned, I will sign off and turn the care of her right hip wound back to him.

## 2022-06-08 NOTE — PLAN OF CARE
Goal Outcome Evaluation:  Plan of Care Reviewed With: patient        Progress: no change  Outcome Evaluation: Ntn follow up. Oral intake 33% of three meals. Await diet advancement. Pt has Boost Plus BID. Cont to follow for plan of care.

## 2022-06-08 NOTE — CONSULTS
[facility]   Consult Note    Patient Name: Tawny Shin  : 1955  MRN: [id number]  Primary Care Physician:  Leta Julian DO  Referring Physician: No Known Provider  Date of admission: 6/3/2022    Consults  Subjective   Subjective     Reason for Consult/ Chief Complaint: Hip wound    History of Present Illness  Tawny Shin is a 66 y.o. female  With a history of a chronic right hip wound with multiple infections who was admitted on 22 with right hip pain and subsequently underwent I&D of her right hip by Dr. Magda Salcido.    Review of Systems   Negative except as mentioned above    Personal History     Past Medical History:   Diagnosis Date   • Age-related osteoporosis with current pathological fracture 2020   • Arthritis    • Asthma    • Bilateral bunions 2020   • Cancer ([facility])    • Cardiac pacemaker syndrome 2020    Overview:  - heart block - implanted    • Charcot's joint of foot, left 2020   • Chronic deep vein thrombosis (DVT) of right lower extremity ([facility]) 2021   • Chronic pain syndrome 2021   • Chronic sinusitis    • COPD (chronic obstructive pulmonary disease) ([facility])    • Coronary artery disease    • 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   • Elevated cholesterol    • Eustachian tube dysfunction    • Heart disease    • Herpes simplex    • History of transfusion    • Hyperlipidemia    • Hypertension    • Hypothyroidism 2020   • Intrinsic asthma 2020   • Knee dislocation    • Labral tear of right hip joint    • Laryngitis sicca    • Laryngitis, chronic    • Left carotid bruit 3/9/2016   • MI (myocardial infarction) ([facility])    • Myalgia due to statin 2019   • Open wound of right hip 2021   • Osteomyelitis of right femur ([facility]) 2021   • Otorrhea    • Pacemaker 2016   • Primary osteoarthritis of left knee 2020   • Psoriasis vulgaris 2020   • S/P coronary artery stent placement 3/9/2016   •  "Sensorineural hearing loss    • Seropositive rheumatoid arthritis of multiple sites ([facility]) 12/27/2019    Overview:  -myochrysine '93-'96 -methotrexate '96--->11/98;r/s  restarted 2/99--> 8/14 (anemia) -sulfasalazine- not effective -penicillamine 6/98-->10/98; no effect -leflunomide 11/98--> - Humira '13-->didn't take - Enbrel 12/14-->3/15- no effect!   Last Assessment & Plan:  - \"aching all over\" because she had to be off her anti-rheumatic drugs for 2 weeks in preparation for her R knee surgery - he   • Sick sinus syndrome ([facility]) 12/27/2019   • Sjogren's disease ([facility])    • Spondylolisthesis of lumbar region 1/17/2018   • Syncope, recurrent 2/8/2021       Past Surgical History:   Procedure Laterality Date   • A-V CARDIAC PACEMAKER INSERTION  2016   • ATRIAL CARDIAC PACEMAKER INSERTION     • CARDIAC CATHETERIZATION     • CATARACT EXTRACTION     • CERVICAL CORPECTOMY N/A 3/3/2021    Procedure: CERVICAL 6 CORPECTOMY WITH TITANIUM CAGE WITH NEURO MONITORING;  Surgeon: Bandar Shea MD;  Location: [facility] OR;  Service: Neurosurgery;  Laterality: N/A;   • COLONOSCOPY  11/08/2011    One fold in the ascending colon which showed ulcer otherwise normal exam   • COLONOSCOPY  11/12/2004    Normal exam repeat in five years   • CORONARY ANGIOPLASTY WITH STENT PLACEMENT      X 2; 2013 & 2014   • ENDOSCOPY  07/10/2014    Normal exam   • FLAP LEG Right 9/14/2021    Procedure: RIGHT GLUTEAL FASCIOCUTANEOUS ADVANCEMENT FLAP AND RIGHT TENSOR FASCIAL EJSSICA FLAP;  Surgeon: Amadeo Turner MD;  Location: [facility] OR;  Service: Plastics;  Laterality: Right;   • HIP ABDUCTION TENOTOMY BILATERAL Right 1/14/2021    Procedure: RIGHT HIP GLUTEUS MEDLUS / MINIMUS REPAIR, POSSIBLE ACHILLES ALLOGRAFT;  Surgeon: Nino Carlson MD;  Location: [facility] OR;  Service: Orthopedics;  Laterality: Right;   • INCISION AND DRAINAGE ABSCESS Right 6/4/2022    Procedure: INCISION AND DRAINAGE ABSCESS right hip;  Surgeon: Magda Salcido MD;  Location: [facility]" [facility] OR;  Service: General;  Laterality: Right;   • INCISION AND DRAINAGE HIP Right 2/9/2021    Procedure: HIP INCISION AND DRAINAGE;  Surgeon: Nino Carlson MD;  Location:  [facility] OR;  Service: Orthopedics;  Laterality: Right;   • INCISION AND DRAINAGE LEG Right 10/24/2021    Procedure: INCISION AND DRAINAGE LOWER EXTREMITY;  Surgeon: Amadeo Turner MD;  Location:  [facility] OR;  Service: Plastics;  Laterality: Right;   • INCISION AND DRAINAGE OF WOUND Right 7/8/2021    Procedure: INCISION AND DRAINAGE WOUND RIGHT HIP;  Surgeon: James Huntley MD;  Location:  [facility] OR;  Service: Orthopedics;  Laterality: Right;   • JOINT REPLACEMENT     • KYPHOPLASTY WITH BIOPSY Bilateral 10/26/2021    Procedure: THOARCIC 12 KYPHOPLASTY WITH BIOPSY;  Surgeon: Bandar Shea MD;  Location:  [facility] OR;  Service: Neurosurgery;  Laterality: Bilateral;   • LEG DEBRIDEMENT Right 9/14/2021    Procedure: DEBRIDEMENT OF RIGHT HIP WOUND, RIGHT GLUTEAL FASCIOCUTANEOUS ADVANCEMENT FLAP AND RIGHT TENSOR FASCIAL JESSICA FLAP;  Surgeon: Amadeo Turner MD;  Location:  [facility] OR;  Service: Plastics;  Laterality: Right;   • LUMBAR DISCECTOMY Right 3/23/2021    Procedure: LUMBAR DISCECTOMY MICRO, Lumbar 1/2 right;  Surgeon: Bandar Shea MD;  Location:  [facility] OR;  Service: Neurosurgery;  Laterality: Right;   • LUMBAR FUSION N/A 1/19/2018    Procedure: L3-4,L4-5 DECOMPRESSION, POSTERIOR SPINAL FUSION WITH INSTRUMENTATION;  Surgeon: Fortino Oropeza MD;  Location: [facility] OR;  Service:    • LUMBAR LAMINECTOMY WITH FUSION Left 1/17/2018    Procedure: LEFT L3-4 L4-5 LATERAL LUMBAR INTERBODY FUSION;  Surgeon: Fortino Oropeza MD;  Location:  [facility] OR;  Service:    • MYRINGOTOMY W/ TUBES  09/04/2014    TUBES NO LONGER IN PLACE   • OTHER SURGICAL HISTORY      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 so hardware was removed and spacers were placed   • REPLACEMENT TOTAL KNEE Right        Family History: Her family history includes Cancer in  "her mother; Heart disease in her father.     Social History: She  reports that she has never smoked. She has never used smokeless tobacco. She reports that she does not drink alcohol and does not use drugs.    Home Medications:  DULoxetine, Diclofenac Sodium, Evolocumab, HYDROcodone-acetaminophen, Lidocaine, Menthol-Zinc Oxide, Naloxegol Oxalate, Thiamine HCl, acetaminophen, apixaban, ascorbic acid, aspirin, carvedilol, docusate sodium, famotidine, ferrous sulfate, fluticasone, folic acid, furosemide, isosorbide mononitrate, levothyroxine, magnesium hydroxide, magnesium oxide, multivitamin with minerals, nitroglycerin, ondansetron, polyethylene glycol, saccharomyces boulardii, salsalate, sennosides-docusate, sucralfate, traMADol, valACYclovir, and sam's amazing butt    Allergies:  She is allergic to atorvastatin, amoxicillin, escitalopram, nabumetone, niacin er, penicillin g, penicillins, and simvastatin.    Objective    Objective     Vitals:      Temp:  [97.9 °F (36.6 °C)-98.8 °F (37.1 °C)] 98.8 °F (37.1 °C)  Heart Rate:  [60-72] 60  Resp:  [16-18] 16  BP: (153-175)/(60-73) 155/73  Output by Drain (mL) 06/06/22 0701 - 06/06/22 1900 06/06/22 1901 - 06/07/22 0700 06/07/22 0701 - 06/07/22 1900 06/07/22 1901 - 06/07/22 2052 Range Total   Urethral Catheter 18 Fr. 2100 2350 1200  5650       Physical Exam: Wound VAC in place over her right hip.  No surrounding induration.  Back holding suction.    167.6 cm (66\")          06/04/22  0207   Weight: 81.1 kg (178 lb 12.7 oz)       Result Review      Result Review:  I have personally reviewed the results from the time of this admission to 6/7/2022 20:52 CDT and agree with these findings:  []  Laboratory  [x]  Microbiology  []  Radiology  []  EKG/Telemetry   []  Cardiology/Vascular   []  Pathology  []  Old records  []  Other:    Most notable findings include: Scant staph aureus    Assessment & Plan     Assessment / Plan     Brief Patient Summary:  Tawny Shin is a 66 y.o. " female With a chronic right hip wound with the following history:    1.  Patient underwent repair of a gluteus minimus tear with Dr. Nino Carlson in January 2021.  This resulted in a postoperative infection.  2.  Patient returned to the operating room in February 2021 with Dr. Carlson for I&D of the right hip.  Cultures grew staph aureus, and patient began wound care  3.  Patient returned to the operating room in July 2021 with Dr. James Huntley for I&D of a recurrent infection of the right hip. Cultures from this procedure grew bacteriodes and Pasteurella, and the patient continued wound care.  4.  Patient returned to the OR and September 2021 at which time I performed flap coverage of her chronic wound.  Intraoperative cultures from this procedure were negative.  5.  Patient had recurrent infection of the right hip in October 2021 and underwent repeat I&D.  Cultures from this procedure grew staph aureus.  6.  Patient had recurrent infection of the right hip and June 2022 and underwent repeat I&D, this time with Dr. Magda Salcido.  Cultures from this procedure grew staph aureus    Of note, in many instances there were concomitant sites of infection/potential infection such as urinary tract infections and presently wounds on the patient's feet.    I have found it difficult to elicit history from the patient at present.  I have obtained additional information from her son and from Dr. Chong.  It is unclear at present whether the patient has resumed her rheumatoid arthritis medications.  Her son believes that she is on a chronic but low dose of prednisone. The patient is also protein malnourished and appears frail compared to several months ago.    History of recurrent infection in the hip is quite puzzling.  I believe it is likely that this individual has a chronic right hip effusion secondary to prior surgeries as well as her long-standing history of rheumatoid arthritis.  One potential etiology for these recurrent  infections is hematologic seeding during periods of bacteremia.  I favor this etiology, particularly given the often times several months between infections.  The second etiology is perhaps untreated osteomyelitis.  I have recommended obtaining an MRI the pelvis.  If this study is concerning for potential osteomyelitis, the patient would likely benefit from bony debridement.  I would recommend reaching out to orthopedics again if this is the case.  To be clear, I do not believe this patient has had a bone biopsy since September 2021.If there is no concern for osteomyelitis, I would recommend proceeding with wound care.      With respect to potential additional efforts at reconstructing the right hip wound, the patient would need to be optimized again from a nutritional standpoint and from a medication standpoint in terms of being off of immunosuppressive medications.  I have discussed this with the patient's son, and he is in agreement.  It is also not clear to me that the patient desires additional reconstructive efforts.    Active Hospital Problems:  Active Hospital Problems    Diagnosis    • **Abscess, right hip    • Anemia of chronic disease    • Pressure injury of skin of heel    • Chronic pain    • Chronic indwelling Horan catheter    • Abscess of right hip      Added automatically from request for surgery [id number]     • Essential hypertension    • Rheumatoid arthritis involving multiple sites (HCC)    • Functional neurological symptom disorder with weakness or paralysis    • Chronic anticoagulation    • Chronic embolism and thrombosis of unspecified deep veins of left lower extremity (HCC)        DVT prophylaxis:  Medical and mechanical DVT prophylaxis orders are present.      Plan:   1. MRI Pelvis  2. Continue right hip wound care      Electronically signed by Amadeo Turner MD, 06/07/22, 8:52 PM CDT.

## 2022-06-08 NOTE — PLAN OF CARE
Goal Outcome Evaluation:  Plan of Care Reviewed With: patient        Progress: no change  Outcome Evaluation: VSS. No change in neuro status this shift. No c/o pain voiced. Drsg placed to rle per order, ble elevated. Horan care done. Turned/repositioned-pericare prn. Safety maintained.

## 2022-06-08 NOTE — PLAN OF CARE
Goal Outcome Evaluation:  Plan of Care Reviewed With: patient        Progress: no change   Pt alert and oriented x 3. VSS. No c/o pain. RAMIREZ. PPP. Eliquis for VTE. , room air. Tolerating prescribed diet. Kay care provided, clean intact. Voiding via kay. Bedrest. Weight shift assistance provided. Last BM 6/7/22. Pt became more confused this afternoon, Dr notified. NIH done. Pt to have MRI tomorrow. NPO after midnight. Bed alarm set. Call light within reach. Safety maintained.

## 2022-06-08 NOTE — PROGRESS NOTES
[facility]  INPATIENT WOUND CARE    PROGRESS NOTE    Today's Date: 06/08/22    Patient Name: Tawny Shin  MRN: [id number]  CSN: [id number]  PCP: Leta Julian DO  Attending Provider: Butch Chong MD  Length of Stay: 4  .  Patient is currently off the unit getting MRI discussed care with SARAH Wooten.  She reports mild edema of lower extremities.  She also states that patient's confusion has improved.  Plan to see patient tomorrow morning.      This document has been electronically signed by DANIELA Mullins on 6/8/2022 15:14 CDT

## 2022-06-08 NOTE — TELEPHONE ENCOUNTER
PATIENT IS INPATIENT ON 3A. NURSE CALLED FOR A MRI FORM PATIENT IS SCHEDULED TO BE SCANNED TODAY.    DEVICE:IMMANUEL PAGE  A: [id number]  V:[id number]    FORM HAS BEEN SIGNED AND FAXED TO RADIOLOGY.

## 2022-06-09 LAB
ALBUMIN SERPL-MCNC: 2.9 G/DL (ref 3.5–5.2)
ALBUMIN/GLOB SERPL: 0.6 G/DL
ALP SERPL-CCNC: 105 U/L (ref 39–117)
ALT SERPL W P-5'-P-CCNC: 10 U/L (ref 1–33)
ANION GAP SERPL CALCULATED.3IONS-SCNC: 6 MMOL/L (ref 5–15)
AST SERPL-CCNC: 17 U/L (ref 1–32)
BILIRUB SERPL-MCNC: 0.3 MG/DL (ref 0–1.2)
BUN SERPL-MCNC: 11 MG/DL (ref 8–23)
BUN/CREAT SERPL: 14.9 (ref 7–25)
CALCIUM SPEC-SCNC: 8.8 MG/DL (ref 8.6–10.5)
CHLORIDE SERPL-SCNC: 101 MMOL/L (ref 98–107)
CO2 SERPL-SCNC: 29 MMOL/L (ref 22–29)
CREAT SERPL-MCNC: 0.74 MG/DL (ref 0.57–1)
EGFRCR SERPLBLD CKD-EPI 2021: 88.3 ML/MIN/1.73
GLOBULIN UR ELPH-MCNC: 4.7 GM/DL
GLUCOSE SERPL-MCNC: 116 MG/DL (ref 65–99)
POTASSIUM SERPL-SCNC: 3.7 MMOL/L (ref 3.5–5.2)
PROT SERPL-MCNC: 7.6 G/DL (ref 6–8.5)
SODIUM SERPL-SCNC: 136 MMOL/L (ref 136–145)

## 2022-06-09 PROCEDURE — 97530 THERAPEUTIC ACTIVITIES: CPT

## 2022-06-09 PROCEDURE — 80053 COMPREHEN METABOLIC PANEL: CPT | Performed by: INTERNAL MEDICINE

## 2022-06-09 PROCEDURE — 97110 THERAPEUTIC EXERCISES: CPT

## 2022-06-09 PROCEDURE — 25010000002 CEFEPIME PER 500 MG: Performed by: SPECIALIST

## 2022-06-09 RX ADMIN — Medication 10 ML: at 08:58

## 2022-06-09 RX ADMIN — Medication 324 MG: at 08:57

## 2022-06-09 RX ADMIN — CLONIDINE HYDROCHLORIDE 0.3 MG: 0.2 TABLET ORAL at 16:22

## 2022-06-09 RX ADMIN — CEFEPIME 2 G: 2 INJECTION, POWDER, FOR SOLUTION INTRAVENOUS at 03:26

## 2022-06-09 RX ADMIN — HYDROCODONE BITARTRATE AND ACETAMINOPHEN 1 TABLET: 7.5; 325 TABLET ORAL at 08:57

## 2022-06-09 RX ADMIN — POLYETHYLENE GLYCOL 3350 17 G: 17 POWDER, FOR SOLUTION ORAL at 08:58

## 2022-06-09 RX ADMIN — BISACODYL 10 MG: 10 SUPPOSITORY RECTAL at 08:56

## 2022-06-09 RX ADMIN — CEFEPIME 2 G: 2 INJECTION, POWDER, FOR SOLUTION INTRAVENOUS at 20:37

## 2022-06-09 RX ADMIN — MAGNESIUM GLUCONATE 500 MG ORAL TABLET 400 MG: 500 TABLET ORAL at 08:56

## 2022-06-09 RX ADMIN — TRAMADOL HYDROCHLORIDE 100 MG: 50 TABLET, COATED ORAL at 16:44

## 2022-06-09 RX ADMIN — DOCUSATE SODIUM 100 MG: 100 CAPSULE, LIQUID FILLED ORAL at 20:55

## 2022-06-09 RX ADMIN — LIDOCAINE 1 PATCH: 50 PATCH CUTANEOUS at 08:56

## 2022-06-09 RX ADMIN — CEFEPIME 2 G: 2 INJECTION, POWDER, FOR SOLUTION INTRAVENOUS at 11:58

## 2022-06-09 RX ADMIN — Medication 10 ML: at 20:56

## 2022-06-09 RX ADMIN — DOCUSATE SODIUM 50 MG AND SENNOSIDES 8.6 MG 2 TABLET: 8.6; 5 TABLET, FILM COATED ORAL at 08:56

## 2022-06-09 RX ADMIN — CARVEDILOL 25 MG: 25 TABLET, FILM COATED ORAL at 08:57

## 2022-06-09 RX ADMIN — CARVEDILOL 25 MG: 25 TABLET, FILM COATED ORAL at 18:04

## 2022-06-09 RX ADMIN — THIAMINE HCL TAB 100 MG 100 MG: 100 TAB at 08:56

## 2022-06-09 RX ADMIN — APIXABAN 5 MG: 5 TABLET, FILM COATED ORAL at 08:57

## 2022-06-09 RX ADMIN — FUROSEMIDE 40 MG: 40 TABLET ORAL at 08:57

## 2022-06-09 RX ADMIN — FLUTICASONE PROPIONATE 1 SPRAY: 50 SPRAY, METERED NASAL at 08:56

## 2022-06-09 RX ADMIN — LISINOPRIL 10 MG: 10 TABLET ORAL at 08:56

## 2022-06-09 RX ADMIN — ACETAMINOPHEN 650 MG: 325 TABLET ORAL at 03:41

## 2022-06-09 RX ADMIN — SUCRALFATE 1 G: 1 TABLET ORAL at 18:04

## 2022-06-09 RX ADMIN — SUCRALFATE 1 G: 1 TABLET ORAL at 11:58

## 2022-06-09 RX ADMIN — SUCRALFATE 1 G: 1 TABLET ORAL at 05:20

## 2022-06-09 RX ADMIN — ISOSORBIDE MONONITRATE 60 MG: 60 TABLET, EXTENDED RELEASE ORAL at 08:56

## 2022-06-09 RX ADMIN — DOCUSATE SODIUM 100 MG: 100 CAPSULE, LIQUID FILLED ORAL at 08:58

## 2022-06-09 RX ADMIN — SUCRALFATE 1 G: 1 TABLET ORAL at 20:55

## 2022-06-09 RX ADMIN — OXYCODONE HYDROCHLORIDE AND ACETAMINOPHEN 500 MG: 500 TABLET ORAL at 08:57

## 2022-06-09 RX ADMIN — ASPIRIN 81 MG: 81 TABLET, COATED ORAL at 08:57

## 2022-06-09 RX ADMIN — Medication 250 MG: at 08:56

## 2022-06-09 RX ADMIN — LEVOTHYROXINE SODIUM 75 MCG: 75 TABLET ORAL at 05:20

## 2022-06-09 RX ADMIN — DULOXETINE HYDROCHLORIDE 60 MG: 30 CAPSULE, DELAYED RELEASE ORAL at 08:57

## 2022-06-09 RX ADMIN — DOCUSATE SODIUM 50 MG AND SENNOSIDES 8.6 MG 2 TABLET: 8.6; 5 TABLET, FILM COATED ORAL at 20:55

## 2022-06-09 RX ADMIN — APIXABAN 5 MG: 5 TABLET, FILM COATED ORAL at 20:55

## 2022-06-09 RX ADMIN — Medication 1 TABLET: at 08:56

## 2022-06-09 RX ADMIN — COLLAGENASE SANTYL 1 APPLICATION: 250 OINTMENT TOPICAL at 08:58

## 2022-06-09 RX ADMIN — VALACYCLOVIR HYDROCHLORIDE 500 MG: 500 TABLET, FILM COATED ORAL at 08:56

## 2022-06-09 RX ADMIN — FAMOTIDINE 40 MG: 20 TABLET, FILM COATED ORAL at 08:57

## 2022-06-09 NOTE — PLAN OF CARE
Goal Outcome Evaluation:  Plan of Care Reviewed With: patient        Progress: improving  Patient alert and very pleasant. BP elevated over night and Dr. Cade was notified. Wound vac in place. Pain medication given for mild back pain. Patient takes medication in apple sauce without difficulty. Patient being turned throughout the night. Call bell in reach and safety measures maintained.

## 2022-06-09 NOTE — PLAN OF CARE
Goal Outcome Evaluation:  Plan of Care Reviewed With: patient, sibling        Progress: no change  Outcome Evaluation: Pt is confused this shift to time and situation, pleasant and cooperative with care, repositioned in bed, complaints of pain improved with PRN medications. Wound vac to right hip dc'd per therapy and wet-dry dressing placed per MD orders, dressing changed to right heel. FC draining clear yellow urine to BSD, safety maintained.

## 2022-06-09 NOTE — THERAPY TREATMENT NOTE
Acute Care - Physical Therapy Treatment Note  Psychiatric     Patient Name: Tawny Shin  : 1955  MRN: [id number]  Today's Date: 2022      Visit Dx:     ICD-10-CM ICD-9-CM   1. Abscess of right hip  L02.415 682.6   2. Impaired mobility  Z74.09 799.89     Patient Active Problem List   Diagnosis   • T12 compression fracture, initial encounter ([facility])   • Chronic embolism and thrombosis of unspecified deep veins of left lower extremity ([facility])   • Urinary tract infection without hematuria   • Acute bilateral thoracic back pain   • Chronic anticoagulation   • Hypokalemia   • Transaminitis   • Iron deficiency anemia   • Osteoporosis   • Bacteremia   • Epidural hematoma ([facility])   • Pleural effusion, left   • Functional neurological symptom disorder with weakness or paralysis   • Overweight (BMI 25.0-29.9)   • Rheumatoid arthritis involving multiple sites ([facility])   • (HFpEF) heart failure with preserved ejection fraction ([facility]), acute on chronic   • Venous insufficiency   • Coronary artery disease involving native coronary artery of native heart without angina pectoris   • Sick sinus syndrome ([facility])   • Essential hypertension   • Pressure injury of skin of heel   • Abscess, right hip   • Chronic pain   • Chronic indwelling Horan catheter   • Abscess of right hip   • Anemia of chronic disease   • Cholelithiasis     Past Medical History:   Diagnosis Date   • Age-related osteoporosis with current pathological fracture 2020   • Arthritis    • Asthma    • Bilateral bunions 2020   • Cancer ([facility])    • Cardiac pacemaker syndrome 2020    Overview:  - heart block - implanted    • Charcot's joint of foot, left 2020   • Chronic deep vein thrombosis (DVT) of right lower extremity ([facility]) 2021   • Chronic pain syndrome 2021   • Chronic sinusitis    • COPD (chronic obstructive pulmonary disease) ([facility])    • Coronary artery disease    • 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   • Elevated  "cholesterol    • Eustachian tube dysfunction    • Heart disease    • Herpes simplex    • History of transfusion    • Hyperlipidemia    • Hypertension    • Hypothyroidism 12/23/2020   • Intrinsic asthma 12/23/2020   • Knee dislocation    • Labral tear of right hip joint    • Laryngitis sicca    • Laryngitis, chronic    • Left carotid bruit 3/9/2016   • MI (myocardial infarction) ([facility])    • Myalgia due to statin 6/25/2019   • Open wound of right hip 9/14/2021   • Osteomyelitis of right femur ([facility]) 7/6/2021   • Otorrhea    • Pacemaker 11/17/2016   • Primary osteoarthritis of left knee 12/23/2020   • Psoriasis vulgaris 12/23/2020   • S/P coronary artery stent placement 3/9/2016   • Sensorineural hearing loss    • Seropositive rheumatoid arthritis of multiple sites ([facility]) 12/27/2019    Overview:  -myochrysine '93-'96 -methotrexate '96--->11/98;r/s  restarted 2/99--> 8/14 (anemia) -sulfasalazine- not effective -penicillamine 6/98-->10/98; no effect -leflunomide 11/98--> - Humira '13-->didn't take - Enbrel 12/14-->3/15- no effect!   Last Assessment & Plan:  - \"aching all over\" because she had to be off her anti-rheumatic drugs for 2 weeks in preparation for her R knee surgery - he   • Sick sinus syndrome ([facility]) 12/27/2019   • Sjogren's disease ([facility])    • Spondylolisthesis of lumbar region 1/17/2018   • Syncope, recurrent 2/8/2021     Past Surgical History:   Procedure Laterality Date   • A-V CARDIAC PACEMAKER INSERTION  2016   • ATRIAL CARDIAC PACEMAKER INSERTION     • CARDIAC CATHETERIZATION     • CATARACT EXTRACTION     • CERVICAL CORPECTOMY N/A 3/3/2021    Procedure: CERVICAL 6 CORPECTOMY WITH TITANIUM CAGE WITH NEURO MONITORING;  Surgeon: Bandar Shea MD;  Location: [facility];  Service: Neurosurgery;  Laterality: N/A;   • COLONOSCOPY  11/08/2011    One fold in the ascending colon which showed ulcer otherwise normal exam   • COLONOSCOPY  11/12/2004    Normal exam repeat in five years   • CORONARY ANGIOPLASTY " WITH STENT PLACEMENT      X 2; 2013 & 2014   • ENDOSCOPY  07/10/2014    Normal exam   • FLAP LEG Right 9/14/2021    Procedure: RIGHT GLUTEAL FASCIOCUTANEOUS ADVANCEMENT FLAP AND RIGHT TENSOR FASCIAL JESSICA FLAP;  Surgeon: Amadeo Turner MD;  Location:  [facility];  Service: Plastics;  Laterality: Right;   • HIP ABDUCTION TENOTOMY BILATERAL Right 1/14/2021    Procedure: RIGHT HIP GLUTEUS MEDLUS / MINIMUS REPAIR, POSSIBLE ACHILLES ALLOGRAFT;  Surgeon: Nino Carlson MD;  Location:  [facility];  Service: Orthopedics;  Laterality: Right;   • INCISION AND DRAINAGE ABSCESS Right 6/4/2022    Procedure: INCISION AND DRAINAGE ABSCESS right hip;  Surgeon: Magda Salcido MD;  Location:  [facility];  Service: General;  Laterality: Right;   • INCISION AND DRAINAGE HIP Right 2/9/2021    Procedure: HIP INCISION AND DRAINAGE;  Surgeon: Nino Carlson MD;  Location:  [facility];  Service: Orthopedics;  Laterality: Right;   • INCISION AND DRAINAGE LEG Right 10/24/2021    Procedure: INCISION AND DRAINAGE LOWER EXTREMITY;  Surgeon: Amadeo Turner MD;  Location:  [facility];  Service: Plastics;  Laterality: Right;   • INCISION AND DRAINAGE OF WOUND Right 7/8/2021    Procedure: INCISION AND DRAINAGE WOUND RIGHT HIP;  Surgeon: James Huntley MD;  Location:  [facility];  Service: Orthopedics;  Laterality: Right;   • JOINT REPLACEMENT     • KYPHOPLASTY WITH BIOPSY Bilateral 10/26/2021    Procedure: THOARCIC 12 KYPHOPLASTY WITH BIOPSY;  Surgeon: Bandar Shea MD;  Location:  [facility];  Service: Neurosurgery;  Laterality: Bilateral;   • LEG DEBRIDEMENT Right 9/14/2021    Procedure: DEBRIDEMENT OF RIGHT HIP WOUND, RIGHT GLUTEAL FASCIOCUTANEOUS ADVANCEMENT FLAP AND RIGHT TENSOR FASCIAL JESSICA FLAP;  Surgeon: Amadeo Turner MD;  Location:  [facility];  Service: Plastics;  Laterality: Right;   • LUMBAR DISCECTOMY Right 3/23/2021    Procedure: LUMBAR DISCECTOMY MICRO, Lumbar 1/2 right;  Surgeon: Bandar Shea MD;  Location:   [facility];  Service: Neurosurgery;  Laterality: Right;   • LUMBAR FUSION N/A 1/19/2018    Procedure: L3-4,L4-5 DECOMPRESSION, POSTERIOR SPINAL FUSION WITH INSTRUMENTATION;  Surgeon: Fortino Oropeza MD;  Location:  [facility];  Service:    • LUMBAR LAMINECTOMY WITH FUSION Left 1/17/2018    Procedure: LEFT L3-4 L4-5 LATERAL LUMBAR INTERBODY FUSION;  Surgeon: Fortino Oropeza MD;  Location:  [facility];  Service:    • MYRINGOTOMY W/ TUBES  09/04/2014    TUBES NO LONGER IN PLACE   • OTHER SURGICAL HISTORY      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 so hardware was removed and spacers were placed   • REPLACEMENT TOTAL KNEE Right      PT Assessment (last 12 hours)     PT Evaluation and Treatment     Row Name 06/09/22 0902          Physical Therapy Time and Intention    Subjective Information complains of;pain  removal of w/v dressing  -LY     Document Type therapy note (daily note)  -LY     Mode of Treatment physical therapy  -LY     Comment Per [place], PTA removed w/v dressing and applied wet dressing to R hip. Nsg aware and awaiting orders for wound care  -LY     Row Name 06/09/22 0902          General Information    Existing Precautions/Restrictions fall  -LY     Row Name 06/09/22 0902          Pain    Pretreatment Pain Rating 0/10 - no pain  -LY     Posttreatment Pain Rating 5/10  with dressing removal  -LY     Pain Location - Side/Orientation Right  -LY     Pain Location - hip  -LY     Pain Intervention(s) Medication (See MAR)  -LY     Row Name 06/09/22 0902          Bed Mobility    Rolling Left [place] (Bed Mobility) verbal cues;minimum assist (75% patient effort)  -LY     Rolling Right [place] (Bed Mobility) verbal cues;minimum assist (75% patient effort)  -LY     Assistive Device (Bed Mobility) bed rails  -LY     Comment, (Bed Mobility) required tactile cues to understand commands  -LY     Row Name 06/09/22 0902          Hip (Therapeutic Exercise)    Hip AROM (Therapeutic Exercise)  left;aBduction;aDduction;external rotation;internal rotation;supine;15 repititions  R IR/ER AROM  -LY     Hip AAROM (Therapeutic Exercise) right;bilateral;aBduction;aDduction  -LY     Row Name 06/09/22 0902          Knee (Therapeutic Exercise)    Knee AROM (Therapeutic Exercise) bilateral;heel slides;supine;15 repititions  -LY     Knee Isometrics (Therapeutic Exercise) bilateral;gluteal sets;quad sets;10 repetitions  -LY     Row Name 06/09/22 0902          Ankle (Therapeutic Exercise)    Ankle AROM (Therapeutic Exercise) bilateral;dorsiflexion;plantarflexion;15 repititions  limited range noted in R ankle  -LY     Row Name 06/09/22 0902          Wound 06/04/22 0211 Right anterior greater trochanter    Wound - Properties Group Placement Date: 06/04/22  -LV Placement Time: 0211  -LV Present on Hospital Admission: Y  -LV Side: Right  -LV Orientation: anterior  -LV Location: greater trochanter  -LV     Dressing Appearance dry;intact  -LY     Base clean;moist;pink;red;subcutaneous  -LY     Periwound blanchable;pink  -LY     Dressing Care dressing removed  -LY     Wound Output (mL) 50  100 total  -LY     Retired Wound - Properties Group Placement Date: 06/04/22  -LV Placement Time: 0211  -LV Present on Hospital Admission: Y  -LV Side: Right  -LV Orientation: anterior  -LV Location: greater trochanter  -LV     Retired Wound - Properties Group Date first assessed: 06/04/22  -LV Time first assessed: 0211  -LV Present on Hospital Admission: Y  -LV Side: Right  -LV Location: greater trochanter  -LV     Row Name             Wound 04/22/22 1858 Left posterior heel Pressure Injury    Wound - Properties Group Placement Date: 04/22/22  -BS Placement Time: 1858 -BS Side: Left  -MT Orientation: posterior  -MT Location: heel  -MT Primary Wound Type: Pressure inj  -MT Removal Date: --  -BS Removal Time: --  -BS Wound Outcome: Healed  -MT     Retired Wound - Properties Group Placement Date: 04/22/22  -BS Placement Time: 1858 -BS Side:  Left  -MT Orientation: posterior  -MT Location: heel  -MT Primary Wound Type: Pressure inj  -MT Removal Date: --  -BS Removal Time: --  -BS Wound Outcome: Healed  -MT     Retired Wound - Properties Group Date first assessed: 04/22/22  -BS Time first assessed: 1858 -BS Side: Left  -MT Location: heel  -MT Primary Wound Type: Pressure inj  -MT Resolution Date: --  -BS Resolution Time: --  -BS Wound Outcome: Healed  -MT     Row Name             Wound 04/22/22 1858 Right lateral leg Pressure Injury    Wound - Properties Group Placement Date: 04/22/22  -BS Placement Time: 1858 -BS Present on Hospital Admission: Y  -MT Side: Right  -MT Orientation: lateral  -MT Location: leg  -MT Primary Wound Type: Pressure inj  -MT Removal Date: --  -BS Removal Time: --  -BS     Retired Wound - Properties Group Placement Date: 04/22/22  -BS Placement Time: 1858 -BS Present on Hospital Admission: Y  -MT Side: Right  -MT Orientation: lateral  -MT Location: leg  -MT Primary Wound Type: Pressure inj  -MT Removal Date: --  -BS Removal Time: --  -BS     Retired Wound - Properties Group Date first assessed: 04/22/22  -BS Time first assessed: 1858 -BS Present on Hospital Admission: Y  -MT Side: Right  -MT Location: leg  -MT Primary Wound Type: Pressure inj  -MT Resolution Date: --  -BS Resolution Time: --  -BS     Row Name             Wound 04/22/22 1726 Left anterior greater trochanter    Wound - Properties Group Placement Date: 04/22/22  -MT Placement Time: 1726  -MT Present on Hospital Admission: Y  -MT Side: Left  -MT Orientation: anterior  -MT Location: greater trochanter  -MT     Retired Wound - Properties Group Placement Date: 04/22/22  -MT Placement Time: 1726  -MT Present on Hospital Admission: Y  -MT Side: Left  -MT Orientation: anterior  -MT Location: greater trochanter  -MT     Retired Wound - Properties Group Date first assessed: 04/22/22  -MT Time first assessed: 1726  -MT Present on Hospital Admission: Y  -MT Side: Left  -MT  Location: greater trochanter  -MT     Row Name             Wound 06/04/22 0827 Right anterior hip Incision    Wound - Properties Group Placement Date: 06/04/22  -HW Placement Time: 0827 -HW Side: Right  -HW Orientation: anterior  -HW Location: hip  -HW Primary Wound Type: Incision  -HW     Retired Wound - Properties Group Placement Date: 06/04/22  -HW Placement Time: 0827 -HW Side: Right  -HW Orientation: anterior  -HW Location: hip  -HW Primary Wound Type: Incision  -HW     Retired Wound - Properties Group Date first assessed: 06/04/22  -HW Time first assessed: 0827 -HW Side: Right  -HW Location: hip  -HW Primary Wound Type: Incision  -HW     Row Name             Wound 06/07/22 0414 Right gluteal    Wound - Properties Group Placement Date: 06/07/22  -LV Placement Time: 0414  -LV Side: Right  -LV Location: gluteal  -LV     Retired Wound - Properties Group Placement Date: 06/07/22  -LV Placement Time: 0414  -LV Side: Right  -LV Location: gluteal  -LV     Retired Wound - Properties Group Date first assessed: 06/07/22  -LV Time first assessed: 0414  -LV Side: Right  -LV Location: gluteal  -LV     Row Name 06/09/22 0902          NPWT (Negative Pressure Wound Therapy) 06/06/22 1000 R hip    NPWT (Negative Pressure Wound Therapy) - Properties Group Placement Date: 06/06/22  -MS Placement Time: 1000  -MS Location: R hip  -MS     Sponges Removed 2  -LY     Retired NPWT (Negative Pressure Wound Therapy) - Properties Group Placement Date: 06/06/22  -MS Placement Time: 1000  -MS Location: R hip  -MS     Retired NPWT (Negative Pressure Wound Therapy) - Properties Group Placement Date: 06/06/22  -MS Placement Time: 1000  -MS Location: R hip  -MS     Row Name 06/09/22 0902          Plan of Care Review    Plan of Care Reviewed With patient  -LY     Progress improving  -LY     Outcome Evaluation PT tx completed. Per MD note, PTA removed w/v dressing on R hip, wet dressing applied. Nsg aware and awaiting further wound care  orders. Pt completed BLE therex x15 reps, required assist for R hip abd/add, all other planes AROM. Rolls L and R with min x1. Required tactile cues throughout tx in order to understand commands given by PTA. Will cont to follow.  -LY     Row Name 06/09/22 0902          Positioning and Restraints    Pre-Treatment Position in bed  -LY     Post Treatment Position bed  -LY     In Bed fowlers;call light within reach;encouraged to call for assist  -LY           User Key  (r) = Recorded By, (t) = Taken By, (c) = Cosigned By    Initials Name Provider Type    MS Nicole Olson, PT, DPT, NCS Physical Therapist    Ranjana Sin, RN Registered Nurse    Leeann Younger RN Registered Nurse    Bianca Hallman PTA Physical Therapist Assistant    Benjie Melendez, RN Registered Nurse    Consuelo Rodríguez RN Registered Nurse                Physical Therapy Education                 Title: PT OT SLP Therapies (In Progress)     Topic: Physical Therapy (In Progress)     Point: Mobility training (Not Started)     Learner Progress:  Not documented in this visit.          Point: Home exercise program (Not Started)     Learner Progress:  Not documented in this visit.          Point: Body mechanics (Not Started)     Learner Progress:  Not documented in this visit.          Point: Precautions (Done)     Learning Progress Summary           Patient Acceptance, E, VU by MS at 6/6/2022 8020    Comment: wound vac role in pt care and healing                               User Key     Initials Effective Dates Name Provider Type Discipline    MS 06/19/18 -  Nicole Olson, PT, DPT, NCS Physical Therapist PT              PT Recommendation and Plan     Plan of Care Reviewed With: patient  Progress: improving  Outcome Evaluation: PT tx completed. Per MD note, PTA removed w/v dressing on R hip, wet dressing applied. Nsg aware and awaiting further wound care orders. Pt completed BLE therex x15 reps, required assist for R hip  abd/add, all other planes AROM. Rolls L and R with min x1. Required tactile cues throughout tx in order to understand commands given by PTA. Will cont to follow.   Outcome Measures     Row Name 06/09/22 1000 06/07/22 1100          How much help from another person do you currently need...    Turning from your back to your side while in flat bed without using bedrails? 3  -LY 2  -LY     Moving from lying on back to sitting on the side of a flat bed without bedrails? 1  -LY 1  -LY     Moving to and from a bed to a chair (including a wheelchair)? 1  -LY 1  -LY     Standing up from a chair using your arms (e.g., wheelchair, bedside chair)? 1  -LY 1  -LY     Climbing 3-5 steps with a railing? 1  -LY 1  -LY     To walk in hospital room? 1  -LY 1  -LY     AM-PAC 6 Clicks Score (PT) 8  -LY 7  -LY            Functional Assessment    Outcome Measure Options AM-PAC 6 Clicks Basic Mobility (PT)  -LY AM-PAC 6 Clicks Basic Mobility (PT)  -LY           User Key  (r) = Recorded By, (t) = Taken By, (c) = Cosigned By    Initials Name Provider Type    Bianca Hallman PTA Physical Therapist Assistant                 Time Calculation:    PT Charges     Row Name 06/09/22 0959             Time Calculation    Start Time 0902  -LY      Stop Time 0942  -LY      Time Calculation (min) 40 min  -LY      PT Received On 06/09/22  -LY              Time Calculation- PT    Total Timed Code Minutes- PT 40 minute(s)  -LY              Timed Charges    15249 - PT Therapeutic Exercise Minutes 24  -LY      22873 - PT Therapeutic Activity Minutes 16  -LY              Total Minutes    Timed Charges Total Minutes 40  -LY       Total Minutes 40  -LY            User Key  (r) = Recorded By, (t) = Taken By, (c) = Cosigned By    Initials Name Provider Type    Bianca Hallman PTA Physical Therapist Assistant              Therapy Charges for Today     Code Description Service Date Service Provider Modifiers Qty    53979087630 HC PT THER PROC EA 15 MIN  6/9/2022 Bianca Appiah, SERENA GP 2    85862403013 HC PT THERAPEUTIC ACT EA 15 MIN 6/9/2022 Bianca Appiah, SERENA GP 1          PT G-Codes  Outcome Measure Options: AM-PAC 6 Clicks Basic Mobility (PT)  AM-PAC 6 Clicks Score (PT): 8    Bianca Appiah PTA  6/9/2022

## 2022-06-09 NOTE — PLAN OF CARE
Goal Outcome Evaluation:              Outcome Evaluation: Pt alert to self and location. Pt has moments of confusion w/ slow response at times.  When asked year pt stated 2000, nurse stated 2022 and pt repeated 2022 several times. When asked why she was her she stated 2022 several times. Reoriented pt.  Pt took large pills in applesauce.  Dressing to rt leg changed per orders.  BLE elevated.  Wound vac in place.  PRN pain medication given x 1 for hip pain w/ good results.  ABX infused per orders.  Horan catheter replaced.  Safety maintained.

## 2022-06-09 NOTE — PROGRESS NOTES
[facility] Medicine Services  INPATIENT PROGRESS NOTE    Patient Name: Tawny Shin  Date of Admission: 6/3/2022  Today's Date: 06/09/22  Length of Stay: 5  Primary Care Physician: Leta Julian DO    Subjective   Chief Complaint: right hip wound  HPI     Patient was seen and examined at bedside.  The patient is somewhat apathetic today and seems very down today.  Patient very frustrated with the news from the MRI.  Plastic surgery suggested in their note no wound vac and reconsult orthopedic surgery.  Patient has no pain and no complaints.  Patient denies any fever or chills.  Patient denies any nausea or vomiting.    Per nursing, the patient was a little bit confused yesterday.  Patient appears at baseline today when I talk with her.  She is alert and oriented x3 and is not oriented to the current situation.    Review of Systems   Constitutional: Positive for fatigue. Negative for activity change, chills and fever.   Respiratory: Negative for cough and shortness of breath.    Cardiovascular: Negative for chest pain and palpitations.   Gastrointestinal: Negative for abdominal distention, abdominal pain, diarrhea, nausea and vomiting.   Psychiatric/Behavioral: Positive for decreased concentration and dysphoric mood.        All pertinent negatives and positives are as above. All other systems have been reviewed and are negative unless otherwise stated.     Objective    Temp:  [97.5 °F (36.4 °C)-98.5 °F (36.9 °C)] 97.7 °F (36.5 °C)  Heart Rate:  [59-71] 60  Resp:  [16-18] 16  BP: (119-193)/(49-73) 193/65  Physical Exam  Vitals and nursing note reviewed.   Constitutional:       Appearance: Normal appearance.   HENT:      Head: Normocephalic and atraumatic.      Mouth/Throat:      Mouth: Mucous membranes are dry.      Pharynx: Oropharynx is clear.   Eyes:      General: No scleral icterus.     Conjunctiva/sclera: Conjunctivae normal.   Cardiovascular:      Rate and Rhythm:  Normal rate and regular rhythm.   Pulmonary:      Effort: Pulmonary effort is normal. No respiratory distress.      Breath sounds: Normal breath sounds.   Abdominal:      General: Abdomen is flat. Bowel sounds are normal. There is no distension.      Palpations: Abdomen is soft. There is no mass.      Tenderness: There is no abdominal tenderness.      Hernia: No hernia is present.   Skin:     General: Skin is warm and dry.      Coloration: Skin is pale.   Neurological:      General: No focal deficit present.      Mental Status: She is alert and oriented to person, place, and time.   Psychiatric:         Attention and Perception: Attention normal.         Mood and Affect: Mood is depressed.         Cognition and Memory: Cognition normal.         Judgment: Judgment normal.             Results Review:  I have reviewed the labs, radiology results, and diagnostic studies.    Laboratory Data:   Results from last 7 days   Lab Units 06/08/22  0700 06/06/22  0658 06/04/22  1111   WBC 10*3/mm3 4.39 4.74 5.21   HEMOGLOBIN g/dL 8.4* 8.6* 7.9*   HEMATOCRIT % 27.8* 28.9* 26.3*   PLATELETS 10*3/mm3 225 220 222        Results from last 7 days   Lab Units 06/09/22  0711 06/08/22  0700 06/06/22  0658   SODIUM mmol/L 136 138 135*   POTASSIUM mmol/L 3.7 3.6 4.2   CHLORIDE mmol/L 101 104 101   CO2 mmol/L 29.0 31.0* 31.0*   BUN mg/dL 11 11 13   CREATININE mg/dL 0.74 0.64 0.72   CALCIUM mg/dL 8.8 8.6 8.6   BILIRUBIN mg/dL 0.3 0.3 0.2   ALK PHOS U/L 105 96 110   ALT (SGPT) U/L 10 10 12   AST (SGOT) U/L 17 14 18   GLUCOSE mg/dL 116* 99 126*       Culture Data:   Blood Culture   Date Value Ref Range Status   06/03/2022 No growth at 5 days  Final   06/03/2022 No growth at 5 days  Final     Urine Culture   Date Value Ref Range Status   06/04/2022 >100,000 CFU/mL Pseudomonas aeruginosa (A)  Final   06/04/2022 >100,000 CFU/mL Escherichia coli (A)  Final     Wound Culture   Date Value Ref Range Status   06/04/2022 Scant growth (1+) Staphylococcus  aureus (A)  Final   06/04/2022 Light growth (2+) Staphylococcus aureus (A)  Final       Radiology Data:   Imaging Results (Last 24 Hours)     Procedure Component Value Units Date/Time    MRI Pelvis With & Without Contrast [[id number]] Collected: 06/08/22 1706     Updated: 06/08/22 1724    Narrative:      EXAMINATION:  MRI PELVIS W WO CONTRAST-  6/8/2022 2:31 PM CDT     HISTORY: Evaluate for osteomyelitis. L02.415-Cutaneous abscess of right  lower limb.     COMPARISON: No comparison study.     TECHNIQUE: Multiplanar imaging was performed in a high-field magnet  before and after IV gadolinium contrast administration. 16 mL MultiHance  was injected intravenously.     FINDINGS: The patient had an abnormal CT of the right hip. The  subcutaneous tissues along the lateral side of the right hip are not  fully included on this study due to the field-of-view and limitations  with the magnet. A full MRI of the pelvis was ordered instead of MRI of  the right hip region. There is T2 high signal edema within the gluteus  musculature bilaterally. This is greater on the right side. There is  abnormal enhancement within the soft tissues lateral to the right femur  in the trochanteric region. There is a very small amount of fluid  located centrally on the postcontrast images. This likely represents  phlegmon with possibly early development of abscess in the subcutaneous  fat. There is a linear band of T2 high signal that extends from this  area into the right femur greater trochanter region that enhances after  contrast administration. This likely represents extension of infection  through a sinus tract extending into the greater trochanter. There is a  mild degree of bone marrow edema surrounding this. There is a 2 cm  leiomyoma in the body of the uterus posteriorly. There is no bone marrow  edema or abnormal enhancement within the visualized iliac bones. There  is a mild degree of presacral edema. There is no abnormal  signal  identified within the sacrum or the coccyx. There is rim enhancement of  the fat in the posterior right buttock region. There is no fluid signal  on T2 images in this area. These likely represent areas of rim-enhancing  fat necrosis related to injections. There is a Horan catheter in the  bladder.       Impression:      1. Enhancing soft tissue lateral to the proximal right femur containing  a small amount of fluid signal centrally likely represents phlegmon with  possible early development of abscess formation.  2. There is abnormal enhancement that extends into the proximal right  femur in the greater trochanter region with surrounding edema. This  extends from the collection lateral to the right proximal femur. This  likely represents a sinus tract extending into the femur. There is some  edema in this area as well. The findings are consistent with  osteomyelitis.  3. There is a 2 cm uterine leiomyoma. There is presacral edema.   This report was finalized on 06/08/2022 17:21 by Dr. Zurdo Vazquez MD.    US Gallbladder [[id number]] Collected: 06/08/22 1507     Updated: 06/08/22 1515    Narrative:      ULTRASOUND GALLBLADDER 6/8/2022 2:12 PM CDT     REASON FOR EXAM: pain, nausea       COMPARISON: NONE      TECHNIQUE: Multiple longitudinal and transverse realtime sonographic  images of the right upper quadrant of the abdomen are obtained.      FINDINGS:    Pancreas: Normal in size and echogenicity.      Liver: Normal in size, echogenicity and echotexture. Hepatopedal flow is  present in the portal vein. No focal lesion.      Gallbladder:   Negative gallbladder and 9 mm acoustic reflection with distal shadowing  is present. This is a calculus is in the neck of the gallbladder.     Bile ducts: The CBD measures 0.78 cm in diameter. There is no  intrahepatic or extrahepatic ductal dilation.      Right kidney: Normal in size, shape and contour. The right renal contour  is 5.4 x 4.8 x 11 cm. No mass, hydronephrosis  or calculi. No perinephric  fluid collection.       Other: The visualized abdominal aorta is normal in caliber.        Impression:      9 mm calculus in the neck of the gallbladder.        This report was finalized on 06/08/2022 15:12 by Dr. Chai Francisco MD.          I have reviewed the patient's current medications.     Assessment/Plan     Active Hospital Problems    Diagnosis    • **Abscess, right hip    • Cholelithiasis    • Anemia of chronic disease    • Pressure injury of skin of heel    • Chronic pain    • Chronic indwelling Horan catheter    • Abscess of right hip      Added automatically from request for surgery [id number]     • Essential hypertension    • Rheumatoid arthritis involving multiple sites (HCC)    • Functional neurological symptom disorder with weakness or paralysis    • Chronic anticoagulation    • Chronic embolism and thrombosis of unspecified deep veins of left lower extremity (HCC)        Plan:    Patient was admitted on 6/3 by Dr. Paul.  Patient was noted to have right hip pain and drainage from the site.    General surgery was consulted, the patient was taken to the OR on 6/4 by Dr. Salcido.  The CT scan showed inflamed fluid collection.  There was noted to be some necrotic tissue in there, the patient had significant irrigation and debridement of the necrotic tissue.    Infectious disease was consulted, she has been followed by them periodically for history of infections in this area in the past.  Cultures have again grown staph aureus.  Patient's urine is growing Pseudomonas and E. coli.  Infectious disease managing antibiotics.    Plastic surgery was consulted, as they did her previous flap.  They recommended against the wound VAC and to reconsult orthopedic surgery.  Patient's MRI show concern for osteomyelitis as well as abscess formation in the deeper tissues.    Appreciate wound care consult as well.    Patient has had no right upper quadrant abdominal pain for me.    Orthopedic  consult    Home medications reviewed, and resumed as appropriate.                  Discharge Planning: I expect the patient to be discharged to SNF > 2 days.  Electronically signed by Moises Oliveira MD, 06/09/22, 11:10 CDT.

## 2022-06-09 NOTE — PROGRESS NOTES
MRI of pelvis has been completed.  Findings are consistent with osteomyelitis of the femur extending in a sinus track into the femoral neck from the region of the greater trochanter.  I reviewed these images personally.    In the presence of untreated osteomyelitis, I would recommend discontinuation of negative pressure wound therapy.  I would advocate returning to dressing changes.    I also recommend getting orthopedic surgery back involved to get recommendations regarding bony debridement.  They may advocate completing treatment of osteomyelitis with IV antibiotics prior to debridement.    Will defer to ID for antibiotic treatment recommendations.  I antiipate prolonged IV antibiotic therapy.  Potentially PICC placement.

## 2022-06-09 NOTE — PLAN OF CARE
Goal Outcome Evaluation:  Plan of Care Reviewed With: patient        Progress: improving  Outcome Evaluation: PT tx completed. Per MD note, PTA removed w/v dressing on R hip, wet dressing applied. Nsg aware and awaiting further wound care orders. Pt completed BLE therex x15 reps, required assist for R hip abd/add, all other planes AROM. Rolls L and R with min x1. Required tactile cues throughout tx in order to understand commands given by PTA. Will cont to follow.

## 2022-06-10 ENCOUNTER — ANESTHESIA (OUTPATIENT)
Dept: PERIOP | Facility: HOSPITAL | Age: 67
End: 2022-06-10

## 2022-06-10 ENCOUNTER — ANESTHESIA EVENT (OUTPATIENT)
Dept: PERIOP | Facility: HOSPITAL | Age: 67
End: 2022-06-10

## 2022-06-10 ENCOUNTER — APPOINTMENT (OUTPATIENT)
Dept: WOUND CARE | Facility: HOSPITAL | Age: 67
End: 2022-06-10

## 2022-06-10 ENCOUNTER — APPOINTMENT (OUTPATIENT)
Dept: MRI IMAGING | Facility: HOSPITAL | Age: 67
End: 2022-06-10

## 2022-06-10 PROBLEM — G92.9 ENCEPHALOPATHY, TOXIC: Status: ACTIVE | Noted: 2022-06-10

## 2022-06-10 LAB
ABO GROUP BLD: NORMAL
ALBUMIN SERPL-MCNC: 2.5 G/DL (ref 3.5–5.2)
ALBUMIN SERPL-MCNC: 2.5 G/DL (ref 3.5–5.2)
ALBUMIN/GLOB SERPL: 0.6 G/DL
ALBUMIN/GLOB SERPL: 0.6 G/DL
ALP SERPL-CCNC: 93 U/L (ref 39–117)
ALP SERPL-CCNC: 93 U/L (ref 39–117)
ALT SERPL W P-5'-P-CCNC: 8 U/L (ref 1–33)
ALT SERPL W P-5'-P-CCNC: 8 U/L (ref 1–33)
AMMONIA BLD-SCNC: 13 UMOL/L (ref 11–51)
AMORPH URATE CRY URNS QL MICRO: ABNORMAL /HPF
ANION GAP SERPL CALCULATED.3IONS-SCNC: 6 MMOL/L (ref 5–15)
ANION GAP SERPL CALCULATED.3IONS-SCNC: 6 MMOL/L (ref 5–15)
ARTERIAL PATENCY WRIST A: ABNORMAL
AST SERPL-CCNC: 13 U/L (ref 1–32)
AST SERPL-CCNC: 15 U/L (ref 1–32)
ATMOSPHERIC PRESS: 749 MMHG
BACTERIA UR QL AUTO: ABNORMAL /HPF
BASE EXCESS BLDA CALC-SCNC: 5.7 MMOL/L (ref 0–2)
BASOPHILS # BLD AUTO: 0.03 10*3/MM3 (ref 0–0.2)
BASOPHILS NFR BLD AUTO: 0.5 % (ref 0–1.5)
BDY SITE: ABNORMAL
BILIRUB SERPL-MCNC: 0.3 MG/DL (ref 0–1.2)
BILIRUB SERPL-MCNC: 0.3 MG/DL (ref 0–1.2)
BILIRUB UR QL STRIP: NEGATIVE
BLD GP AB SCN SERPL QL: NEGATIVE
BODY TEMPERATURE: 37 C
BUN SERPL-MCNC: 11 MG/DL (ref 8–23)
BUN SERPL-MCNC: 12 MG/DL (ref 8–23)
BUN/CREAT SERPL: 16.4 (ref 7–25)
BUN/CREAT SERPL: 18.8 (ref 7–25)
CALCIUM SPEC-SCNC: 8.5 MG/DL (ref 8.6–10.5)
CALCIUM SPEC-SCNC: 8.5 MG/DL (ref 8.6–10.5)
CHLORIDE SERPL-SCNC: 101 MMOL/L (ref 98–107)
CHLORIDE SERPL-SCNC: 101 MMOL/L (ref 98–107)
CLARITY UR: ABNORMAL
CO2 SERPL-SCNC: 28 MMOL/L (ref 22–29)
CO2 SERPL-SCNC: 28 MMOL/L (ref 22–29)
COLOR UR: YELLOW
CREAT SERPL-MCNC: 0.64 MG/DL (ref 0.57–1)
CREAT SERPL-MCNC: 0.67 MG/DL (ref 0.57–1)
CRP SERPL-MCNC: 8.1 MG/DL (ref 0–0.5)
DEPRECATED RDW RBC AUTO: 51.3 FL (ref 37–54)
EGFRCR SERPLBLD CKD-EPI 2021: 95.3 ML/MIN/1.73
EGFRCR SERPLBLD CKD-EPI 2021: 96.4 ML/MIN/1.73
EOSINOPHIL # BLD AUTO: 0.09 10*3/MM3 (ref 0–0.4)
EOSINOPHIL NFR BLD AUTO: 1.6 % (ref 0.3–6.2)
ERYTHROCYTE [DISTWIDTH] IN BLOOD BY AUTOMATED COUNT: 16 % (ref 12.3–15.4)
GLOBULIN UR ELPH-MCNC: 4.5 GM/DL
GLOBULIN UR ELPH-MCNC: 4.5 GM/DL
GLUCOSE SERPL-MCNC: 107 MG/DL (ref 65–99)
GLUCOSE SERPL-MCNC: 109 MG/DL (ref 65–99)
GLUCOSE UR STRIP-MCNC: NEGATIVE MG/DL
GRAN CASTS URNS QL MICRO: ABNORMAL /LPF
HCO3 BLDA-SCNC: 28.5 MMOL/L (ref 20–26)
HCT VFR BLD AUTO: 29.4 % (ref 34–46.6)
HGB BLD-MCNC: 8.9 G/DL (ref 12–15.9)
HGB UR QL STRIP.AUTO: ABNORMAL
HYALINE CASTS UR QL AUTO: ABNORMAL /LPF
IMM GRANULOCYTES # BLD AUTO: 0.03 10*3/MM3 (ref 0–0.05)
IMM GRANULOCYTES NFR BLD AUTO: 0.5 % (ref 0–0.5)
KETONES UR QL STRIP: NEGATIVE
LEUKOCYTE ESTERASE UR QL STRIP.AUTO: ABNORMAL
LYMPHOCYTES # BLD AUTO: 1.03 10*3/MM3 (ref 0.7–3.1)
LYMPHOCYTES NFR BLD AUTO: 18.5 % (ref 19.6–45.3)
Lab: ABNORMAL
MCH RBC QN AUTO: 26.4 PG (ref 26.6–33)
MCHC RBC AUTO-ENTMCNC: 30.3 G/DL (ref 31.5–35.7)
MCV RBC AUTO: 87.2 FL (ref 79–97)
MODALITY: ABNORMAL
MONOCYTES # BLD AUTO: 0.59 10*3/MM3 (ref 0.1–0.9)
MONOCYTES NFR BLD AUTO: 10.6 % (ref 5–12)
NEUTROPHILS NFR BLD AUTO: 3.8 10*3/MM3 (ref 1.7–7)
NEUTROPHILS NFR BLD AUTO: 68.3 % (ref 42.7–76)
NITRITE UR QL STRIP: NEGATIVE
NRBC BLD AUTO-RTO: 0 /100 WBC (ref 0–0.2)
PCO2 BLDA: 33.8 MM HG (ref 35–45)
PCO2 TEMP ADJ BLD: 33.8 MM HG (ref 35–45)
PH BLDA: 7.53 PH UNITS (ref 7.35–7.45)
PH UR STRIP.AUTO: 8 [PH] (ref 5–8)
PH, TEMP CORRECTED: 7.53 PH UNITS (ref 7.35–7.45)
PLATELET # BLD AUTO: 201 10*3/MM3 (ref 140–450)
PMV BLD AUTO: 9.2 FL (ref 6–12)
PO2 BLDA: 90.3 MM HG (ref 83–108)
PO2 TEMP ADJ BLD: 90.3 MM HG (ref 83–108)
POTASSIUM SERPL-SCNC: 3.7 MMOL/L (ref 3.5–5.2)
POTASSIUM SERPL-SCNC: 3.8 MMOL/L (ref 3.5–5.2)
PROCALCITONIN SERPL-MCNC: 0.09 NG/ML (ref 0–0.25)
PROT SERPL-MCNC: 7 G/DL (ref 6–8.5)
PROT SERPL-MCNC: 7 G/DL (ref 6–8.5)
PROT UR QL STRIP: ABNORMAL
RBC # BLD AUTO: 3.37 10*6/MM3 (ref 3.77–5.28)
RBC # UR STRIP: ABNORMAL /HPF
REF LAB TEST METHOD: ABNORMAL
RH BLD: POSITIVE
SAO2 % BLDCOA: 98.1 % (ref 94–99)
SARS-COV-2 RNA PNL SPEC NAA+PROBE: NOT DETECTED
SODIUM SERPL-SCNC: 135 MMOL/L (ref 136–145)
SODIUM SERPL-SCNC: 135 MMOL/L (ref 136–145)
SP GR UR STRIP: 1.01 (ref 1–1.03)
SQUAMOUS #/AREA URNS HPF: ABNORMAL /HPF
T&S EXPIRATION DATE: NORMAL
UROBILINOGEN UR QL STRIP: ABNORMAL
VENTILATOR MODE: ABNORMAL
WBC # UR STRIP: ABNORMAL /HPF
WBC NRBC COR # BLD: 5.57 10*3/MM3 (ref 3.4–10.8)

## 2022-06-10 PROCEDURE — 87205 SMEAR GRAM STAIN: CPT | Performed by: SURGERY

## 2022-06-10 PROCEDURE — 86923 COMPATIBILITY TEST ELECTRIC: CPT

## 2022-06-10 PROCEDURE — 0YBC0ZZ EXCISION OF RIGHT UPPER LEG, OPEN APPROACH: ICD-10-PCS | Performed by: SURGERY

## 2022-06-10 PROCEDURE — 25010000002 HYDROMORPHONE PER 4 MG: Performed by: ANESTHESIOLOGY

## 2022-06-10 PROCEDURE — 86900 BLOOD TYPING SEROLOGIC ABO: CPT | Performed by: SURGERY

## 2022-06-10 PROCEDURE — 25010000002 VANCOMYCIN 1 G RECONSTITUTED SOLUTION 1 EACH VIAL: Performed by: SURGERY

## 2022-06-10 PROCEDURE — 25010000002 FENTANYL CITRATE (PF) 100 MCG/2ML SOLUTION: Performed by: NURSE ANESTHETIST, CERTIFIED REGISTERED

## 2022-06-10 PROCEDURE — 85025 COMPLETE CBC W/AUTO DIFF WBC: CPT | Performed by: INTERNAL MEDICINE

## 2022-06-10 PROCEDURE — A9577 INJ MULTIHANCE: HCPCS | Performed by: INTERNAL MEDICINE

## 2022-06-10 PROCEDURE — 87116 MYCOBACTERIA CULTURE: CPT | Performed by: SURGERY

## 2022-06-10 PROCEDURE — 25010000002 PROPOFOL 10 MG/ML EMULSION: Performed by: NURSE ANESTHETIST, CERTIFIED REGISTERED

## 2022-06-10 PROCEDURE — 87176 TISSUE HOMOGENIZATION CULTR: CPT | Performed by: SURGERY

## 2022-06-10 PROCEDURE — 36600 WITHDRAWAL OF ARTERIAL BLOOD: CPT

## 2022-06-10 PROCEDURE — 87102 FUNGUS ISOLATION CULTURE: CPT | Performed by: SURGERY

## 2022-06-10 PROCEDURE — 80053 COMPREHEN METABOLIC PANEL: CPT | Performed by: INTERNAL MEDICINE

## 2022-06-10 PROCEDURE — 82140 ASSAY OF AMMONIA: CPT | Performed by: INTERNAL MEDICINE

## 2022-06-10 PROCEDURE — 86850 RBC ANTIBODY SCREEN: CPT | Performed by: SURGERY

## 2022-06-10 PROCEDURE — 84145 PROCALCITONIN (PCT): CPT | Performed by: INTERNAL MEDICINE

## 2022-06-10 PROCEDURE — 25010000002 CEFAZOLIN PER 500 MG: Performed by: INTERNAL MEDICINE

## 2022-06-10 PROCEDURE — 86140 C-REACTIVE PROTEIN: CPT | Performed by: INTERNAL MEDICINE

## 2022-06-10 PROCEDURE — 82803 BLOOD GASES ANY COMBINATION: CPT

## 2022-06-10 PROCEDURE — 0 GADOBENATE DIMEGLUMINE 529 MG/ML SOLUTION: Performed by: INTERNAL MEDICINE

## 2022-06-10 PROCEDURE — 70553 MRI BRAIN STEM W/O & W/DYE: CPT

## 2022-06-10 PROCEDURE — 25010000002 CEFEPIME PER 500 MG: Performed by: SPECIALIST

## 2022-06-10 PROCEDURE — 25010000002 EPINEPHRINE PER 0.1 MG: Performed by: SURGERY

## 2022-06-10 PROCEDURE — 25010000002 ONDANSETRON PER 1 MG: Performed by: NURSE ANESTHETIST, CERTIFIED REGISTERED

## 2022-06-10 PROCEDURE — 87206 SMEAR FLUORESCENT/ACID STAI: CPT | Performed by: SURGERY

## 2022-06-10 PROCEDURE — 25010000002 CEFAZOLIN PER 500 MG: Performed by: SURGERY

## 2022-06-10 PROCEDURE — 87070 CULTURE OTHR SPECIMN AEROBIC: CPT | Performed by: SURGERY

## 2022-06-10 PROCEDURE — 81001 URINALYSIS AUTO W/SCOPE: CPT | Performed by: INTERNAL MEDICINE

## 2022-06-10 PROCEDURE — 86901 BLOOD TYPING SEROLOGIC RH(D): CPT | Performed by: SURGERY

## 2022-06-10 PROCEDURE — 87635 SARS-COV-2 COVID-19 AMP PRB: CPT | Performed by: SURGERY

## 2022-06-10 RX ORDER — HYDROMORPHONE HYDROCHLORIDE 1 MG/ML
0.5 INJECTION, SOLUTION INTRAMUSCULAR; INTRAVENOUS; SUBCUTANEOUS
Status: DISCONTINUED | OUTPATIENT
Start: 2022-06-10 | End: 2022-06-10 | Stop reason: HOSPADM

## 2022-06-10 RX ORDER — DROPERIDOL 2.5 MG/ML
0.62 INJECTION, SOLUTION INTRAMUSCULAR; INTRAVENOUS ONCE AS NEEDED
Status: DISCONTINUED | OUTPATIENT
Start: 2022-06-10 | End: 2022-06-10 | Stop reason: HOSPADM

## 2022-06-10 RX ORDER — LABETALOL HYDROCHLORIDE 5 MG/ML
5 INJECTION, SOLUTION INTRAVENOUS
Status: DISCONTINUED | OUTPATIENT
Start: 2022-06-10 | End: 2022-06-10 | Stop reason: HOSPADM

## 2022-06-10 RX ORDER — FENTANYL CITRATE 50 UG/ML
25 INJECTION, SOLUTION INTRAMUSCULAR; INTRAVENOUS
Status: DISCONTINUED | OUTPATIENT
Start: 2022-06-10 | End: 2022-06-10 | Stop reason: HOSPADM

## 2022-06-10 RX ORDER — SODIUM HYPOCHLORITE 1.25 MG/ML
SOLUTION TOPICAL AS NEEDED
Status: DISCONTINUED | OUTPATIENT
Start: 2022-06-10 | End: 2022-06-10 | Stop reason: HOSPADM

## 2022-06-10 RX ORDER — OXYCODONE AND ACETAMINOPHEN 10; 325 MG/1; MG/1
1 TABLET ORAL ONCE AS NEEDED
Status: DISCONTINUED | OUTPATIENT
Start: 2022-06-10 | End: 2022-06-10 | Stop reason: HOSPADM

## 2022-06-10 RX ORDER — MAGNESIUM HYDROXIDE 1200 MG/15ML
LIQUID ORAL AS NEEDED
Status: DISCONTINUED | OUTPATIENT
Start: 2022-06-10 | End: 2022-06-10 | Stop reason: HOSPADM

## 2022-06-10 RX ORDER — LABETALOL HYDROCHLORIDE 5 MG/ML
10 INJECTION, SOLUTION INTRAVENOUS EVERY 4 HOURS PRN
Status: DISCONTINUED | OUTPATIENT
Start: 2022-06-10 | End: 2022-06-14 | Stop reason: HOSPADM

## 2022-06-10 RX ORDER — ONDANSETRON 2 MG/ML
INJECTION INTRAMUSCULAR; INTRAVENOUS AS NEEDED
Status: DISCONTINUED | OUTPATIENT
Start: 2022-06-10 | End: 2022-06-10 | Stop reason: SURG

## 2022-06-10 RX ORDER — NEOSTIGMINE METHYLSULFATE 5 MG/5 ML
SYRINGE (ML) INTRAVENOUS AS NEEDED
Status: DISCONTINUED | OUTPATIENT
Start: 2022-06-10 | End: 2022-06-10 | Stop reason: SURG

## 2022-06-10 RX ORDER — FLUMAZENIL 0.1 MG/ML
0.2 INJECTION INTRAVENOUS AS NEEDED
Status: DISCONTINUED | OUTPATIENT
Start: 2022-06-10 | End: 2022-06-10 | Stop reason: HOSPADM

## 2022-06-10 RX ORDER — SODIUM CHLORIDE 0.9 % (FLUSH) 0.9 %
10 SYRINGE (ML) INJECTION EVERY 12 HOURS SCHEDULED
Status: DISCONTINUED | OUTPATIENT
Start: 2022-06-10 | End: 2022-06-10 | Stop reason: HOSPADM

## 2022-06-10 RX ORDER — FENTANYL CITRATE 50 UG/ML
INJECTION, SOLUTION INTRAMUSCULAR; INTRAVENOUS AS NEEDED
Status: DISCONTINUED | OUTPATIENT
Start: 2022-06-10 | End: 2022-06-10 | Stop reason: SURG

## 2022-06-10 RX ORDER — ROCURONIUM BROMIDE 10 MG/ML
INJECTION, SOLUTION INTRAVENOUS AS NEEDED
Status: DISCONTINUED | OUTPATIENT
Start: 2022-06-10 | End: 2022-06-10 | Stop reason: SURG

## 2022-06-10 RX ORDER — BUPIVACAINE HCL/0.9 % NACL/PF 0.1 %
2 PLASTIC BAG, INJECTION (ML) EPIDURAL EVERY 8 HOURS
Status: DISCONTINUED | OUTPATIENT
Start: 2022-06-10 | End: 2022-06-14 | Stop reason: HOSPADM

## 2022-06-10 RX ORDER — ONDANSETRON 2 MG/ML
4 INJECTION INTRAMUSCULAR; INTRAVENOUS
Status: DISCONTINUED | OUTPATIENT
Start: 2022-06-10 | End: 2022-06-10 | Stop reason: HOSPADM

## 2022-06-10 RX ORDER — SODIUM CHLORIDE, SODIUM LACTATE, POTASSIUM CHLORIDE, CALCIUM CHLORIDE 600; 310; 30; 20 MG/100ML; MG/100ML; MG/100ML; MG/100ML
9 INJECTION, SOLUTION INTRAVENOUS CONTINUOUS
Status: DISCONTINUED | OUTPATIENT
Start: 2022-06-10 | End: 2022-06-10

## 2022-06-10 RX ORDER — NALOXONE HCL 0.4 MG/ML
0.04 VIAL (ML) INJECTION AS NEEDED
Status: DISCONTINUED | OUTPATIENT
Start: 2022-06-10 | End: 2022-06-10 | Stop reason: HOSPADM

## 2022-06-10 RX ORDER — LIDOCAINE HYDROCHLORIDE 20 MG/ML
INJECTION, SOLUTION EPIDURAL; INFILTRATION; INTRACAUDAL; PERINEURAL AS NEEDED
Status: DISCONTINUED | OUTPATIENT
Start: 2022-06-10 | End: 2022-06-10 | Stop reason: SURG

## 2022-06-10 RX ORDER — PROPOFOL 10 MG/ML
VIAL (ML) INTRAVENOUS AS NEEDED
Status: DISCONTINUED | OUTPATIENT
Start: 2022-06-10 | End: 2022-06-10 | Stop reason: SURG

## 2022-06-10 RX ORDER — SODIUM CHLORIDE 0.9 % (FLUSH) 0.9 %
10 SYRINGE (ML) INJECTION AS NEEDED
Status: DISCONTINUED | OUTPATIENT
Start: 2022-06-10 | End: 2022-06-10 | Stop reason: HOSPADM

## 2022-06-10 RX ORDER — LIDOCAINE HYDROCHLORIDE 10 MG/ML
0.5 INJECTION, SOLUTION EPIDURAL; INFILTRATION; INTRACAUDAL; PERINEURAL ONCE AS NEEDED
Status: DISCONTINUED | OUTPATIENT
Start: 2022-06-10 | End: 2022-06-10 | Stop reason: HOSPADM

## 2022-06-10 RX ORDER — DEXTROSE MONOHYDRATE 25 G/50ML
12.5 INJECTION, SOLUTION INTRAVENOUS AS NEEDED
Status: DISCONTINUED | OUTPATIENT
Start: 2022-06-10 | End: 2022-06-10 | Stop reason: HOSPADM

## 2022-06-10 RX ORDER — IBUPROFEN 600 MG/1
600 TABLET ORAL ONCE AS NEEDED
Status: DISCONTINUED | OUTPATIENT
Start: 2022-06-10 | End: 2022-06-10 | Stop reason: HOSPADM

## 2022-06-10 RX ADMIN — PROPOFOL 125 MG: 10 INJECTION, EMULSION INTRAVENOUS at 15:00

## 2022-06-10 RX ADMIN — PROPOFOL 75 MG: 10 INJECTION, EMULSION INTRAVENOUS at 15:03

## 2022-06-10 RX ADMIN — SUCRALFATE 1 G: 1 TABLET ORAL at 05:50

## 2022-06-10 RX ADMIN — LIDOCAINE HYDROCHLORIDE 100 MG: 20 INJECTION, SOLUTION EPIDURAL; INFILTRATION; INTRACAUDAL; PERINEURAL at 15:00

## 2022-06-10 RX ADMIN — Medication 10 ML: at 21:49

## 2022-06-10 RX ADMIN — ONDANSETRON 4 MG: 2 INJECTION INTRAMUSCULAR; INTRAVENOUS at 16:13

## 2022-06-10 RX ADMIN — CEFEPIME 2 G: 2 INJECTION, POWDER, FOR SOLUTION INTRAVENOUS at 03:10

## 2022-06-10 RX ADMIN — CEFAZOLIN 2 G: 10 INJECTION, POWDER, FOR SOLUTION INTRAVENOUS at 21:50

## 2022-06-10 RX ADMIN — GADOBENATE DIMEGLUMINE 16 ML: 529 INJECTION, SOLUTION INTRAVENOUS at 19:26

## 2022-06-10 RX ADMIN — ROCURONIUM BROMIDE 10 MG: 10 SOLUTION INTRAVENOUS at 15:03

## 2022-06-10 RX ADMIN — Medication 3 MG: at 16:09

## 2022-06-10 RX ADMIN — CEFAZOLIN 2 G: 10 INJECTION, POWDER, FOR SOLUTION INTRAVENOUS at 15:08

## 2022-06-10 RX ADMIN — HYDROMORPHONE HYDROCHLORIDE 0.5 MG: 1 INJECTION, SOLUTION INTRAMUSCULAR; INTRAVENOUS; SUBCUTANEOUS at 17:05

## 2022-06-10 RX ADMIN — ROCURONIUM BROMIDE 20 MG: 10 SOLUTION INTRAVENOUS at 15:00

## 2022-06-10 RX ADMIN — FENTANYL CITRATE 25 MCG: 50 INJECTION, SOLUTION INTRAMUSCULAR; INTRAVENOUS at 16:13

## 2022-06-10 RX ADMIN — GLYCOPYRROLATE 0.4 MG: 0.2 INJECTION INTRAMUSCULAR; INTRAVENOUS at 16:09

## 2022-06-10 RX ADMIN — LEVOTHYROXINE SODIUM 75 MCG: 75 TABLET ORAL at 05:50

## 2022-06-10 NOTE — PLAN OF CARE
Goal Outcome Evaluation:  Plan of Care Reviewed With: daughter, sibling        Progress: no change  Outcome Evaluation: Pt has been lethargic and only answering by shaking head yes or no this shift. Family has been at bedside, I&D of right hip completed, pt returned to room, sister and dtr at bedside. Pt alert, but drowsy. FC to BSD, dressing to right hip is CDI, dressing to right foot/leg is cdi. No current complaints of pain. Safety maintained.

## 2022-06-10 NOTE — ANESTHESIA PREPROCEDURE EVALUATION
Anesthesia Evaluation     Patient summary reviewed   history of anesthetic complications: difficult airway  NPO Solid Status: > 8 hours             Airway   Mallampati: I  TM distance: >3 FB  Neck ROM: full  No difficulty expected  Dental - normal exam     Pulmonary    (+) pleural effusion, COPD, asthma,  Cardiovascular   Exercise tolerance: poor (<4 METS)    (+) pacemaker ([employer] ) pacemaker, hypertension well controlled 2 medications or greater, past MI  >12 months, CAD, cardiac stents (2 stents, most recent 6 years ago) more than 12 months ago DVT (Acute--There is evidence of deep venous thrombosis and superficial 6/23) resolved, hyperlipidemia,       Neuro/Psych  (+) headaches, syncope, numbness, dementia,      ROS Comment: Pt not responding to questions.  Encephalopathy per PCP note  GI/Hepatic/Renal/Endo    (+)   thyroid problem hypothyroidism  (-) liver disease, no renal disease, diabetes    Musculoskeletal     (+) gait problem,   Abdominal    Substance History - negative use     OB/GYN          Other   arthritis (rheumatoid arthritis), blood dyscrasia anemia,       Other Comment: Sjogren's                     Anesthesia Plan    ASA 3     general     (Hx per chart review and d/w pts daughter who gave verbal consent for surgery)  intravenous induction

## 2022-06-10 NOTE — PROGRESS NOTES
Reassuringly benign I&D of the right hip.    No purulence encountered.    No necrotic tissue encountered.    Bone cultures of the greater trochanter obtained.    I excised the chronic wound bursa conically from the skin to the greater trochanter.  This will facilitate improved wound care with dressing changes.  I do not believe there is an acute infectious process occurring at this site.  I believe the positive cultures from the initial I&D were likely from a colonized wound.  I believe there is also likely a chronic osteomyelitis present in the femur.  The deeper soft tissues of the hip did not appear infected in any way.     I will recommend continued local wound care with dressing changes.  Any efforts to optimize the patient's nutrition will be beneficial for her wound healing capacity.

## 2022-06-10 NOTE — ANESTHESIA PROCEDURE NOTES
Airway  Urgency: elective    Date/Time: 6/10/2022 3:05 PM  Airway not difficult    General Information and Staff    Patient location during procedure: OR  CRNA/CAA: Rachele Aleman CRNA    Indications and Patient Condition  Indications for airway management: airway protection    Preoxygenated: yes  Mask difficulty assessment: 2 - vent by mask + OA or adjuvant +/- NMBA    Final Airway Details  Final airway type: endotracheal airway      Successful airway: ETT  Cuffed: yes   Successful intubation technique: direct laryngoscopy  Facilitating devices/methods: Bougie and anterior pressure/BURP  Endotracheal tube insertion site: oral  Blade: Flores  Blade size: 2  ETT size (mm): 7.0  Cormack-Lehane Classification: grade IIb - view of arytenoids or posterior of glottis only  Placement verified by: chest auscultation and capnometry   Cuff volume (mL): 6  Measured from: lips  ETT/EBT  to lips (cm): 22  Number of attempts at approach: 2  Assessment: lips, teeth, and gum same as pre-op and atraumatic intubation    Additional Comments  Atraumatic intubation.  First attempt with Correia 4 - grade 3 view. Second attempt after patient more relaxed with flores 2 grade 2b view with cricoid pressure and bougie.   Patient's mouth is incredibly dry and copious thick oropharyngeal secretions present and hindering DL and view.

## 2022-06-10 NOTE — OP NOTE
INCISION AND DRAINAGE ABSCESS  Procedure Report    Patient Name:  Tawny Shin  YOB: 1955    Date of Surgery:  6/10/2022    Attending Surgeon: Amadeo Turner MD     PROCEDURE:  Debridement of chronic right hip wound, skin/subcutaneous tissue/fascia/muscle/bone, 90 sq cm    PREOPERATIVE DIAGNOSIS:  Chronic right hip wound    POSTOPERATIVE DIAGNOSIS:  Chronic right hip wound    ANESTHESIA:  general    INDICATION FOR PROCEDURE:  This is a 66-year-old female with a very complicated history involving her right hip with multiple prior infections, multiple prior surgeries, and multiple prior courses of antibiotic therapy.  She is currently in the hospital with altered mental status, and although she has no clear indication of sepsis, I felt obligated to rule out an acute infectious process of the hip based on her clinical picture and based on recent MRI findings.  I will also intend to debride the chronic wound margins to improve capacity for continued wound care.    OPERATIVE FINDINGS:  I debrided the entirety of the chronic wound bursa from the level of the skin, through the subcutaneous plane, around the tensor fascial rodney, and into the right hip bursa, concluding at the greater trochanter of the femur.  There was no evidence of an acute infectious process in the deeper space of the hip.  There were no necrotic tissue margins.  There was no purulence.  I obtained bone cultures from the greater trochanter.    DESCRIPTION OF PROCEDURE:  The patient was taken to the OR and placed supine on the operating room table with a bump under her right hip.  I tumesced the region of the right hip wound with 0.25% lidocaine with epinephrine to assist with postoperative pain control and to minimize intraoperative and postoperative blood loss.  The patient was then prepped and draped in sterile fashion.  A timeout was performed with all team members.    I began by sharply incising around the current cutaneous  margins of the wound to healthy appearing normal adjacent dermis.  This resulted in a skin level wound measuring approximately 8 cm x 5 cm.  I then proceeded to debride the entirety of the chronic wound bursa from the subcutaneous tissue, cleaned down to the trochanter of the femur.  The majority of this bursa was largely avascular scar.  I debrided to bleeding margins circumferentially.  I also obtained a bone culture of the trochanter.    Next I meticulously hemostased the hip.  Intraoperative blood loss was quite minimal.  I then copiously irrigated the wound with 3 L of vancomycin-containing saline.  I then again checked for meticulous hemostasis.  I proceeded to pack the wound with Dakin's soaked Kerlix gauze.    BLOOD LOSS:   20 mL    COMPLICATIONS:   None    SPECIMENS:          Right femur greater trochanter bone culture    IMPLANTS:    Nothing was implanted during the procedure    DISPOSITION:   Remain inpatient.    STAFF:  Surgeon(s):  Amadeo Turner MD    Assistant: Maritza Arteaga      Electronically signed by Amadeo Turner MD, 06/10/22, 5:15 PM CDT.

## 2022-06-10 NOTE — PROGRESS NOTES
[facility] Medicine Services  INPATIENT PROGRESS NOTE    Patient Name: Tawny Shin  Date of Admission: 6/3/2022  Today's Date: 06/10/22  Length of Stay: 6  Primary Care Physician: Leta Julian DO    Subjective   Chief Complaint: Confusion  Hip Pain     Her eyes were open however she was much less interactive.  Blank stare for most of the assessment.  She would mumble a couple of words when asking some simple yes/no questions, but I could really not get any additional or meaningful history.  This was consistent with what Dr. Turner and Dr. Ohara also saw this morning during their evaluations-have spoken with both of them this morning.    Review of Systems     All pertinent negatives and positives are as above. All other systems have been reviewed and are negative unless otherwise stated.     Objective    Temp:  [97.7 °F (36.5 °C)-98.2 °F (36.8 °C)] 98.2 °F (36.8 °C)  Heart Rate:  [60-67] 67  Resp:  [16-18] 16  BP: (171-204)/(58-67) 171/59  Physical Exam  Vitals reviewed.   Constitutional:       Appearance: She is ill-appearing. She is not toxic-appearing.   HENT:      Head: Normocephalic.      Mouth/Throat:      Mouth: Mucous membranes are dry.      Pharynx: No oropharyngeal exudate.   Eyes:      Pupils: Pupils are equal, round, and reactive to light.   Cardiovascular:      Rate and Rhythm: Normal rate.   Pulmonary:      Effort: Pulmonary effort is normal. No respiratory distress.   Abdominal:      Palpations: Abdomen is soft.      Tenderness: There is no abdominal tenderness (no grimacing this AM).   Genitourinary:     Comments: Chronic kay; normal appearing urine  Musculoskeletal:      Cervical back: Neck supple.   Skin:     General: Skin is warm.      Comments: Wound right hip (dressing in place)   Neurological:      General: No focal deficit present.      Mental Status: She is alert.      Motor: Weakness present.      Comments: Eyes are open but patient  confused and not interactive.  Will answer a couple of yes/no questions, but I have difficulty getting much more interaction than that.  I cannot appreciate any lateralizing symptoms - will spontaneously move both of her lower extremities but I could not get her to perform a good hand grasp for me this morning (with either hand).   Psychiatric:         Mood and Affect: Mood normal.         Results Review:  I have reviewed the labs, radiology results, and diagnostic studies.    Laboratory Data:   Results from last 7 days   Lab Units 06/08/22  0700 06/06/22  0658 06/04/22  1111   WBC 10*3/mm3 4.39 4.74 5.21   HEMOGLOBIN g/dL 8.4* 8.6* 7.9*   HEMATOCRIT % 27.8* 28.9* 26.3*   PLATELETS 10*3/mm3 225 220 222        Results from last 7 days   Lab Units 06/09/22  0711 06/08/22  0700 06/06/22  0658   SODIUM mmol/L 136 138 135*   POTASSIUM mmol/L 3.7 3.6 4.2   CHLORIDE mmol/L 101 104 101   CO2 mmol/L 29.0 31.0* 31.0*   BUN mg/dL 11 11 13   CREATININE mg/dL 0.74 0.64 0.72   CALCIUM mg/dL 8.8 8.6 8.6   BILIRUBIN mg/dL 0.3 0.3 0.2   ALK PHOS U/L 105 96 110   ALT (SGPT) U/L 10 10 12   AST (SGOT) U/L 17 14 18   GLUCOSE mg/dL 116* 99 126*       Culture Data:   Blood Culture   Date Value Ref Range Status   06/03/2022 No growth at 2 days  Preliminary   06/03/2022 No growth at 2 days  Preliminary     Urine Culture   Date Value Ref Range Status   06/04/2022 >100,000 CFU/mL Pseudomonas aeruginosa (A)  Final   06/04/2022 >100,000 CFU/mL Escherichia coli (A)  Final     Wound Culture   Date Value Ref Range Status   06/04/2022 Scant growth (1+) Staphylococcus aureus (A)  Final   06/04/2022 Light growth (2+) Staphylococcus aureus (A)  Final       Radiology Data:   Imaging Results (Last 24 Hours)     ** No results found for the last 24 hours. **          I have reviewed the patient's current medications.     Assessment/Plan     Active Hospital Problems    Diagnosis    • **Abscess, right hip    • Encephalopathy, toxic    • Cholelithiasis    •  Anemia of chronic disease    • Pressure injury of skin of heel    • Chronic pain    • Chronic indwelling Horan catheter    • Abscess of right hip      Added automatically from request for surgery [id number]     • Essential hypertension    • Rheumatoid arthritis involving multiple sites (HCC)    • Functional neurological symptom disorder with weakness or paralysis    • Chronic anticoagulation    • Chronic embolism and thrombosis of unspecified deep veins of left lower extremity (HCC)        Plan:  1.  MRI of the pelvis results reviewed  2.  Discussed with Dr. Turner and Dr. Ohara this morning  3.  ABG now  4.  Repeat UA and urine culture if needed  5.  Check blood cultures  6.  Check CRP, procalcitonin, ammonia  7.  Gallbladder ultrasound reviewew -her comprehensive metabolic profile and liver function tests have recently been unremarkable.  Given her recent symptoms of nausea, associated with some right upper quadrant discomfort, in conjunction with this gallbladder ultrasound, will monitor closely for symptomatic cholelithiasis and biliary colic.    8.  Antibiotics transitioned from Cefepime to Cefazolin - appreciate ID assistance  9.  Appreciate consultants  10.  Horan catheter changed during this hospitalization  11.  Add IV Labetolol PRN   12.  Monitor BP trend closely  13.  Monitor neuro status closely; will reach out to her son, Dr. Casa Shin, this morning as well.    Electronically signed by Butch Chong MD, 06/10/22, 08:53 CDT.

## 2022-06-10 NOTE — PROGRESS NOTES
Infectious Diseases Progress Note    Patient:  Tawny Shin  YOB: 1955  MRN: [id number]   Admit date: 6/3/2022   Admitting Physician: Butch Chong MD  Primary Care Physician: Leta Julian DO    Chief Complaint/Interval History: She has developed some confusion.  She has some delirium.  She was sleeping/somnolent when I walked in.  I was able to get her to open eyes.  At times she would briefly mumble one-word.  I could not get her to follow commands.  She has not had fever.  She has been hypertensive.  It appears the only new med was a dose of clonidine late yesterday afternoon.    Intake/Output Summary (Last 24 hours) at 6/10/2022 0744  Last data filed at 6/10/2022 0444  Gross per 24 hour   Intake --   Output 3050 ml   Net -3050 ml     Allergies:   Allergies   Allergen Reactions   • Atorvastatin Other (See Comments)     LEG CRAMPS     • Amoxicillin Rash   • Escitalopram Rash   • Nabumetone Rash   • Niacin Er Rash   • Penicillin G Rash   • Penicillins Rash   • Simvastatin Rash     Current Scheduled Medications:   apixaban, 5 mg, Oral, Q12H  ascorbic acid, 500 mg, Oral, Daily  aspirin, 81 mg, Oral, Daily  bisacodyl, 10 mg, Rectal, Daily  carvedilol, 25 mg, Oral, BID With Meals  cefepime, 2 g, Intravenous, Q8H  collagenase, 1 application, Topical, Q24H  docusate sodium, 100 mg, Oral, BID  DULoxetine, 60 mg, Oral, Daily  famotidine, 40 mg, Oral, Daily  ferrous gluconate, 324 mg, Oral, Daily With Breakfast  fluticasone, 1 spray, Nasal, Daily  furosemide, 40 mg, Oral, Daily  isosorbide mononitrate, 60 mg, Oral, Daily  levothyroxine, 75 mcg, Oral, Q AM  lidocaine, 1 patch, Transdermal, Q24H  lisinopril, 10 mg, Oral, Q24H  magnesium citrate, 150 mL, Oral, Once  magnesium oxide, 400 mg, Oral, Daily  methylnaltrexone, 12 mg, Subcutaneous, Every Other Day  multivitamin with minerals, 1 tablet, Oral, Daily  polyethylene glycol, 17 g, Oral, Daily  saccharomyces boulardii, 250 mg, Oral,  "Daily  sennosides-docusate, 2 tablet, Oral, BID  sodium chloride, 10 mL, Intravenous, Q12H  sucralfate, 1 g, Oral, 4x Daily AC & at Bedtime  thiamine, 100 mg, Oral, Daily  valACYclovir, 500 mg, Oral, Daily      Current PRN Medications:  •  acetaminophen  •  Diclofenac Sodium  •  droperidol  •  HYDROcodone-acetaminophen  •  hydrocortisone-bacitracin-zinc oxide-nystatin  •  magnesium hydroxide  •  nitroglycerin  •  ondansetron **OR** ondansetron  •  [COMPLETED] Insert peripheral IV **AND** sodium chloride  •  sodium chloride  •  traMADol    Review of Systems unobtainable from patient due to mental status    Vital Signs:  BP (!) 183/67 (BP Location: Right arm, Patient Position: Lying) Comment: nurse notified  Pulse 64   Temp 97.7 °F (36.5 °C) (Axillary)   Resp 16   Ht 167.6 cm (66\")   Wt 81.1 kg (178 lb 12.7 oz)   LMP  (LMP Unknown)   SpO2 96%   BMI 28.86 kg/m²     Physical Exam  Vital signs - reviewed.  Line/IV site - No erythema, warmth, induration, or tenderness.  Right hip dressing clean and dry  Skin without rash  Pupils 3 mm round reactive to light  Facial musculature symmetrical  Moving upper extremities equally  Lungs without crackles  Abdomen with normal bowel sounds.  Soft.  Nontender.    Lab Results:  CBC:   Results from last 7 days   Lab Units 06/08/22  0700 06/06/22  0658 06/04/22  1111 06/03/22  2229   WBC 10*3/mm3 4.39 4.74 5.21 7.76   HEMOGLOBIN g/dL 8.4* 8.6* 7.9* 8.4*   HEMATOCRIT % 27.8* 28.9* 26.3* 27.1*   PLATELETS 10*3/mm3 225 220 222 241     BMP:  Results from last 7 days   Lab Units 06/09/22  0711 06/08/22  0700 06/06/22  0658 06/04/22  1111 06/03/22  2229   SODIUM mmol/L 136 138 135* 138 138   POTASSIUM mmol/L 3.7 3.6 4.2 3.7 3.7   CHLORIDE mmol/L 101 104 101 102 101   CO2 mmol/L 29.0 31.0* 31.0* 27.0 28.0   BUN mg/dL 11 11 13 15 18   CREATININE mg/dL 0.74 0.64 0.72 0.80 0.84   GLUCOSE mg/dL 116* 99 126* 139* 113*   CALCIUM mg/dL 8.8 8.6 8.6 8.5* 8.4*   ALT (SGPT) U/L 10 10 12 11 13 "     Culture Results:   Blood Culture   Date Value Ref Range Status   06/03/2022 No growth at 5 days  Final   06/03/2022 No growth at 5 days  Final     Urine Culture   Date Value Ref Range Status   06/04/2022 >100,000 CFU/mL Pseudomonas aeruginosa (A)  Final   06/04/2022 >100,000 CFU/mL Escherichia coli (A)  Final     Wound Culture   Date Value Ref Range Status   06/04/2022 Scant growth (1+) Staphylococcus aureus (A)  Final   06/04/2022 Light growth (2+) Staphylococcus aureus (A)  Final     Radiology:   IMPRESSION:  1. Enhancing soft tissue lateral to the proximal right femur containing  a small amount of fluid signal centrally likely represents phlegmon with  possible early development of abscess formation.  2. There is abnormal enhancement that extends into the proximal right  femur in the greater trochanter region with surrounding edema. This  extends from the collection lateral to the right proximal femur. This  likely represents a sinus tract extending into the femur. There is some  edema in this area as well. The findings are consistent with  osteomyelitis.  3. There is a 2 cm uterine leiomyoma. There is presacral edema.   This report was finalized on 06/08/2022 17:21 by Dr. Zurdo Vazquez MD.    Additional Studies Reviewed: None    Impression:   1.  Appears to be developing some delirium.  Etiology uncertain.  2.  Right femur osteomyelitis-MSSA  3.  Had UTI on admission with Pseudomonas and E. coli-should have been adequately treated at this point  4.  Hypertension  5.  Rheumatoid arthritis    Recommendations:   Due to change in mental status of repeated lab and culture  Checking CMP, CBC, UA, blood cultures, and arterial blood gas  Continue attempts at blood pressure control  Discontinue cefepime  Transition to cefazolin  Continue to follow    Elie Ohara MD

## 2022-06-10 NOTE — PROGRESS NOTES
Continued Stay Note   [place]     Patient Name: Tawny Shin  MRN: [id number]  Today's Date: 6/10/2022    Admit Date: 6/3/2022     Discharge Plan     Row Name 06/10/22 1347       Plan    Plan [facility]    Plan Comments SS following for return to [facility]. At this point, it is unclear when patient will be medically ready for discharge but patient has a bed hold at [facility].                       Expected Discharge Date and Time     Expected Discharge Date Expected Discharge Time    Jun 14, 2022             OSIEL Hernandez

## 2022-06-10 NOTE — PLAN OF CARE
Goal Outcome Evaluation:  Plan of Care Reviewed With: patient        Progress: improving   Patient alert and very pleasant. BP elevated over night. Dressings CDI. Patient being turned throughout the night. Call bell in reach and safety measures maintained.

## 2022-06-10 NOTE — ANESTHESIA POSTPROCEDURE EVALUATION
Patient: Tawny Shin    Procedure Summary     Date: 06/10/22 Room / Location: [facility] OR [address] [place] [address] PAD OR    Anesthesia Start: 1456 Anesthesia Stop: 1622    Procedure: RIGHT HIP INCISION AND DRAINAGE. MD NEEDS 3L VANC IRRIGATION, LINCOLN BISHOP, KERNEYDA ROLLS (Right ) Diagnosis:     Surgeons: Amadeo Turner MD Provider: Rachele Aleman CRNA    Anesthesia Type: general ASA Status: 3          Anesthesia Type: general    Vitals  Vitals Value Taken Time   /72 06/10/22 1722   Temp 97.6 °F (36.4 °C) 06/10/22 1715   Pulse 60 06/10/22 1720   Resp 18 06/10/22 1720   SpO2 100 % 06/10/22 1720           Post Anesthesia Care and Evaluation    Patient location during evaluation: PACU  Patient participation: complete - patient participated  Level of consciousness: awake  Pain management: adequate    Airway patency: patent  Anesthetic complications: No anesthetic complications  PONV Status: none  Cardiovascular status: acceptable  Respiratory status: acceptable  Hydration status: acceptable

## 2022-06-10 NOTE — PLAN OF CARE
Goal Outcome Evaluation:  New order for unna boots.  Pt out of room, gone for I & D of hip.  Discussion w/ DANIELA Norris who advised to hold unna boots at this time.  Reports she will order ace wraps per nsg to [facility] and re-evaluate on Monday.  Will follow.

## 2022-06-10 NOTE — PROGRESS NOTES
Patient has had a distinct and significant change in her mental status.  She is not responding to questions or verbal commands this morning.  She is opening her eyes when spoken to..    Examination of the hip reveals a granulating wound margins and no surrounding skin induration or cellulitis.  No drainage from the wound.  Overall wound depth is quite superficial.    Patient shows no response to palpation of the hip to indicate pain; however, this is complicated by her overall altered mental status.    Primary team has sent several labs to look for potential expirations for the abrupt change in mental status.  Patient has remained afebrile And is not tachycardic.  She has been a typically hypertensive.    Blood cultures have remained negative.  No elevated WBC and no left shift.    Challenging overall clinical picture.  No clear etiology for the altered mental status.  Repeat I&D of the hip with debridement into deeper space is not a decision I take lightly; however, if there is a deeper space abscess that has not been addressed, this could certainly be the culprit for the changing clinical picture.  Repeat I&D would also provide the opportunity for additional deeper tissue cultures.  This will certainly increase the size of the wound and will Without a doubt require prolonged wound care postoperatively.  I discussed this with the patient's son.    We will proceed with I&D of the right hip.  Stat type and screen and crossmatch RBC has been ordered

## 2022-06-10 NOTE — PLAN OF CARE
Goal Outcome Evaluation:           Progress: no change  Outcome Evaluation: Pt with decline in mental status. She is presently on Low Fat/[place] diet with Boost supplements BID. Only one meal of intake recorded over the last 2 days at 75%. Unsure if Pt has eaten more than that. Increased kcal &protein needs for wound healing. Continue to encourage intake and offer supplements. RD will continue to follow.

## 2022-06-11 ENCOUNTER — APPOINTMENT (OUTPATIENT)
Dept: GENERAL RADIOLOGY | Facility: HOSPITAL | Age: 67
End: 2022-06-11

## 2022-06-11 LAB
ALBUMIN SERPL-MCNC: 2.4 G/DL (ref 3.5–5.2)
ALBUMIN/GLOB SERPL: 0.6 G/DL
ALP SERPL-CCNC: 84 U/L (ref 39–117)
ALT SERPL W P-5'-P-CCNC: 6 U/L (ref 1–33)
ANION GAP SERPL CALCULATED.3IONS-SCNC: 9 MMOL/L (ref 5–15)
AST SERPL-CCNC: 13 U/L (ref 1–32)
BILIRUB SERPL-MCNC: 0.3 MG/DL (ref 0–1.2)
BUN SERPL-MCNC: 13 MG/DL (ref 8–23)
BUN/CREAT SERPL: 17.1 (ref 7–25)
CALCIUM SPEC-SCNC: 8.5 MG/DL (ref 8.6–10.5)
CHLORIDE SERPL-SCNC: 103 MMOL/L (ref 98–107)
CO2 SERPL-SCNC: 26 MMOL/L (ref 22–29)
CREAT SERPL-MCNC: 0.76 MG/DL (ref 0.57–1)
CRP SERPL-MCNC: 10.97 MG/DL (ref 0–0.5)
DEPRECATED RDW RBC AUTO: 50.7 FL (ref 37–54)
EGFRCR SERPLBLD CKD-EPI 2021: 85.5 ML/MIN/1.73
ERYTHROCYTE [DISTWIDTH] IN BLOOD BY AUTOMATED COUNT: 16.1 % (ref 12.3–15.4)
GLOBULIN UR ELPH-MCNC: 4.2 GM/DL
GLUCOSE SERPL-MCNC: 83 MG/DL (ref 65–99)
HCT VFR BLD AUTO: 27.9 % (ref 34–46.6)
HGB BLD-MCNC: 8.4 G/DL (ref 12–15.9)
MCH RBC QN AUTO: 26 PG (ref 26.6–33)
MCHC RBC AUTO-ENTMCNC: 30.1 G/DL (ref 31.5–35.7)
MCV RBC AUTO: 86.4 FL (ref 79–97)
PLATELET # BLD AUTO: 205 10*3/MM3 (ref 140–450)
PMV BLD AUTO: 9.3 FL (ref 6–12)
POTASSIUM SERPL-SCNC: 3.6 MMOL/L (ref 3.5–5.2)
PROT SERPL-MCNC: 6.6 G/DL (ref 6–8.5)
RBC # BLD AUTO: 3.23 10*6/MM3 (ref 3.77–5.28)
SODIUM SERPL-SCNC: 138 MMOL/L (ref 136–145)
WBC NRBC COR # BLD: 5.75 10*3/MM3 (ref 3.4–10.8)

## 2022-06-11 PROCEDURE — 86140 C-REACTIVE PROTEIN: CPT | Performed by: INTERNAL MEDICINE

## 2022-06-11 PROCEDURE — 92610 EVALUATE SWALLOWING FUNCTION: CPT | Performed by: SPEECH-LANGUAGE PATHOLOGIST

## 2022-06-11 PROCEDURE — 25010000002 ENOXAPARIN PER 10 MG: Performed by: INTERNAL MEDICINE

## 2022-06-11 PROCEDURE — 85027 COMPLETE CBC AUTOMATED: CPT | Performed by: SURGERY

## 2022-06-11 PROCEDURE — 80053 COMPREHEN METABOLIC PANEL: CPT | Performed by: SURGERY

## 2022-06-11 PROCEDURE — 71045 X-RAY EXAM CHEST 1 VIEW: CPT

## 2022-06-11 PROCEDURE — 97110 THERAPEUTIC EXERCISES: CPT

## 2022-06-11 PROCEDURE — 25010000002 CEFAZOLIN PER 500 MG: Performed by: SURGERY

## 2022-06-11 RX ORDER — DEXTROSE, SODIUM CHLORIDE, AND POTASSIUM CHLORIDE 5; .9; .15 G/100ML; G/100ML; G/100ML
75 INJECTION INTRAVENOUS CONTINUOUS
Status: DISCONTINUED | OUTPATIENT
Start: 2022-06-11 | End: 2022-06-14 | Stop reason: HOSPADM

## 2022-06-11 RX ORDER — ENOXAPARIN SODIUM 100 MG/ML
40 INJECTION SUBCUTANEOUS EVERY 12 HOURS
Status: DISCONTINUED | OUTPATIENT
Start: 2022-06-11 | End: 2022-06-14 | Stop reason: HOSPADM

## 2022-06-11 RX ORDER — ENALAPRILAT 2.5 MG/2ML
0.62 INJECTION INTRAVENOUS EVERY 6 HOURS
Status: DISCONTINUED | OUTPATIENT
Start: 2022-06-11 | End: 2022-06-14 | Stop reason: HOSPADM

## 2022-06-11 RX ADMIN — Medication 10 ML: at 10:21

## 2022-06-11 RX ADMIN — FLUTICASONE PROPIONATE 1 SPRAY: 50 SPRAY, METERED NASAL at 10:21

## 2022-06-11 RX ADMIN — ENALAPRILAT 0.62 MG: 1.25 INJECTION INTRAVENOUS at 17:56

## 2022-06-11 RX ADMIN — LIDOCAINE 1 PATCH: 50 PATCH CUTANEOUS at 10:21

## 2022-06-11 RX ADMIN — CEFAZOLIN 2 G: 10 INJECTION, POWDER, FOR SOLUTION INTRAVENOUS at 05:35

## 2022-06-11 RX ADMIN — ENALAPRILAT 0.62 MG: 1.25 INJECTION INTRAVENOUS at 23:21

## 2022-06-11 RX ADMIN — ENOXAPARIN SODIUM 40 MG: 100 INJECTION SUBCUTANEOUS at 12:59

## 2022-06-11 RX ADMIN — POTASSIUM CHLORIDE, DEXTROSE MONOHYDRATE AND SODIUM CHLORIDE 75 ML/HR: 150; 5; 900 INJECTION, SOLUTION INTRAVENOUS at 11:32

## 2022-06-11 RX ADMIN — CEFAZOLIN 2 G: 10 INJECTION, POWDER, FOR SOLUTION INTRAVENOUS at 22:05

## 2022-06-11 RX ADMIN — BISACODYL 10 MG: 10 SUPPOSITORY RECTAL at 10:21

## 2022-06-11 RX ADMIN — ENALAPRILAT 0.62 MG: 1.25 INJECTION INTRAVENOUS at 11:31

## 2022-06-11 RX ADMIN — CEFAZOLIN 2 G: 10 INJECTION, POWDER, FOR SOLUTION INTRAVENOUS at 14:29

## 2022-06-11 RX ADMIN — ENOXAPARIN SODIUM 40 MG: 100 INJECTION SUBCUTANEOUS at 23:21

## 2022-06-11 RX ADMIN — COLLAGENASE SANTYL 1 APPLICATION: 250 OINTMENT TOPICAL at 10:22

## 2022-06-11 NOTE — PROGRESS NOTES
Patient mental status seems unchanged at this morning.  She is opening her eyes when spoken to but gives no verbal response.  She is not responding to or withdrawing from what would previously have been painful stimuli, e.g. the right hip dressing change.    The right hip wound appears very clean.    No visible necrosis and no hematoma or continued bleeding.    Packing was removed and replaced.    Gram stain on the trochanteric bone biopsy is negative.  Bone culture results pending.    After exploring the hip yesterday, I do not believe there is an acute infectious process occurring here.  It remains possible that there is chronic osteomyelitis of the femur; however, I do not believe this is driving her clinical picture.  I will recommend continued dressing changes for the time being.  If the wound remains clean and cultures remain negative, I will recommend transitioning back to negative pressure wound therapy sometime next week.    Regarding dressing changes, I strongly recommend avoiding Medpor, as this will inevitably lead to skin irritation and breakdown.  Please use paper tape to loosely secure the overlying ABD. It does not need to be circumferentially taped. Initial dressing changes should require at least half a roll of Kerlix to adequately pack the wound.  If less is used, the depths of the wound are likely not being packed appropriately.  This wound is approximately 10 cm deep, and the Kerlix should be applied with a cotton applicator.  I will reflect these recommendations and a dressing change order.

## 2022-06-11 NOTE — PLAN OF CARE
Goal Outcome Evaluation:  Plan of Care Reviewed With: patient, caregiver, family (SARAH Kilpatrick)        Progress: no change (Initial Evaluation)       Clinical bedside swallow evaluation completed. The patient was alert and poorly cooperative given cognitive status. 2 sisters present at bedside. She was able to say a few words and look when name was called but otherwise did not follow any of my commands.     Primary problem: R hip surgical site drainage s/p I&D.    Oral motor assessment unable to be completed fully due to cognitive status. The patient attempted PO trials of water via straw and pinched straw. Patient bit down on straw instead of sucking. 1x puree given via spoon. Patient opened to receive trial but only held it in her mouth. SLP used toothette to remove lingual residue.     SLP recommends patient remain NPO until cognitive status improves. Will follow up to reassess tomorrow.

## 2022-06-11 NOTE — THERAPY TREATMENT NOTE
Acute Care - Physical Therapy Treatment Note  [facility]     Patient Name: Tawny Shin  : 1955  MRN: [id number]  Today's Date: 2022      Visit Dx:     ICD-10-CM ICD-9-CM   1. Abscess of right hip  L02.415 682.6   2. Impaired mobility  Z74.09 799.89   3. Abscess of hip, right  L02.415 682.6     Patient Active Problem List   Diagnosis   • T12 compression fracture, initial encounter ([facility])   • Chronic embolism and thrombosis of unspecified deep veins of left lower extremity ([facility])   • Urinary tract infection without hematuria   • Acute bilateral thoracic back pain   • Chronic anticoagulation   • Hypokalemia   • Transaminitis   • Iron deficiency anemia   • Osteoporosis   • Bacteremia   • Epidural hematoma ([facility])   • Pleural effusion, left   • Functional neurological symptom disorder with weakness or paralysis   • Overweight (BMI 25.0-29.9)   • Rheumatoid arthritis involving multiple sites ([facility])   • (HFpEF) heart failure with preserved ejection fraction ([facility]), acute on chronic   • Venous insufficiency   • Coronary artery disease involving native coronary artery of native heart without angina pectoris   • Sick sinus syndrome ([facility])   • Essential hypertension   • Pressure injury of skin of heel   • Abscess, right hip   • Chronic pain   • Chronic indwelling Horan catheter   • Abscess of right hip   • Anemia of chronic disease   • Cholelithiasis   • Encephalopathy, toxic     Past Medical History:   Diagnosis Date   • Age-related osteoporosis with current pathological fracture 2020   • Arthritis    • Asthma    • Bilateral bunions 2020   • Cancer ([facility])    • Cardiac pacemaker syndrome 2020    Overview:  - heart block - implanted    • Charcot's joint of foot, left 2020   • Chronic deep vein thrombosis (DVT) of right lower extremity ([facility]) 2021   • Chronic pain syndrome 2021   • Chronic sinusitis    • COPD (chronic obstructive pulmonary disease) ([facility])    • Coronary artery disease  "   • 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   • Elevated cholesterol    • Eustachian tube dysfunction    • Heart disease    • Herpes simplex    • History of transfusion    • Hyperlipidemia    • Hypertension    • Hypothyroidism 12/23/2020   • Intrinsic asthma 12/23/2020   • Knee dislocation    • Labral tear of right hip joint    • Laryngitis sicca    • Laryngitis, chronic    • Left carotid bruit 3/9/2016   • MI (myocardial infarction) ([facility])    • Myalgia due to statin 6/25/2019   • Open wound of right hip 9/14/2021   • Osteomyelitis of right femur ([facility]) 7/6/2021   • Otorrhea    • Pacemaker 11/17/2016   • Primary osteoarthritis of left knee 12/23/2020   • Psoriasis vulgaris 12/23/2020   • S/P coronary artery stent placement 3/9/2016   • Sensorineural hearing loss    • Seropositive rheumatoid arthritis of multiple sites ([facility]) 12/27/2019    Overview:  -myochrysine '93-'96 -methotrexate '96--->11/98;r/s  restarted 2/99--> 8/14 (anemia) -sulfasalazine- not effective -penicillamine 6/98-->10/98; no effect -leflunomide 11/98--> - Humira '13-->didn't take - Enbrel 12/14-->3/15- no effect!   Last Assessment & Plan:  - \"aching all over\" because she had to be off her anti-rheumatic drugs for 2 weeks in preparation for her R knee surgery - he   • Sick sinus syndrome ([facility]) 12/27/2019   • Sjogren's disease ([facility])    • Spondylolisthesis of lumbar region 1/17/2018   • Syncope, recurrent 2/8/2021     Past Surgical History:   Procedure Laterality Date   • A-V CARDIAC PACEMAKER INSERTION  2016   • ATRIAL CARDIAC PACEMAKER INSERTION     • CARDIAC CATHETERIZATION     • CATARACT EXTRACTION     • CERVICAL CORPECTOMY N/A 3/3/2021    Procedure: CERVICAL 6 CORPECTOMY WITH TITANIUM CAGE WITH NEURO MONITORING;  Surgeon: Bandar Shea MD;  Location: [facility];  Service: Neurosurgery;  Laterality: N/A;   • COLONOSCOPY  11/08/2011    One fold in the ascending colon which showed ulcer otherwise normal exam   • COLONOSCOPY  11/12/2004 "    Normal exam repeat in five years   • CORONARY ANGIOPLASTY WITH STENT PLACEMENT      X 2; 2013 & 2014   • ENDOSCOPY  07/10/2014    Normal exam   • FLAP LEG Right 9/14/2021    Procedure: RIGHT GLUTEAL FASCIOCUTANEOUS ADVANCEMENT FLAP AND RIGHT TENSOR FASCIAL JESSICA FLAP;  Surgeon: Amadeo Turner MD;  Location:  [facility];  Service: Plastics;  Laterality: Right;   • HIP ABDUCTION TENOTOMY BILATERAL Right 1/14/2021    Procedure: RIGHT HIP GLUTEUS MEDLUS / MINIMUS REPAIR, POSSIBLE ACHILLES ALLOGRAFT;  Surgeon: Nino Carlson MD;  Location:  [facility];  Service: Orthopedics;  Laterality: Right;   • INCISION AND DRAINAGE ABSCESS Right 6/4/2022    Procedure: INCISION AND DRAINAGE ABSCESS right hip;  Surgeon: Magda Salcido MD;  Location:  [facility];  Service: General;  Laterality: Right;   • INCISION AND DRAINAGE HIP Right 2/9/2021    Procedure: HIP INCISION AND DRAINAGE;  Surgeon: Nino Carlson MD;  Location:  [facility];  Service: Orthopedics;  Laterality: Right;   • INCISION AND DRAINAGE LEG Right 10/24/2021    Procedure: INCISION AND DRAINAGE LOWER EXTREMITY;  Surgeon: Amadeo Turner MD;  Location:  [facility];  Service: Plastics;  Laterality: Right;   • INCISION AND DRAINAGE OF WOUND Right 7/8/2021    Procedure: INCISION AND DRAINAGE WOUND RIGHT HIP;  Surgeon: James Huntley MD;  Location:  [facility];  Service: Orthopedics;  Laterality: Right;   • JOINT REPLACEMENT     • KYPHOPLASTY WITH BIOPSY Bilateral 10/26/2021    Procedure: THOARCIC 12 KYPHOPLASTY WITH BIOPSY;  Surgeon: Bandar Shea MD;  Location:  [facility];  Service: Neurosurgery;  Laterality: Bilateral;   • LEG DEBRIDEMENT Right 9/14/2021    Procedure: DEBRIDEMENT OF RIGHT HIP WOUND, RIGHT GLUTEAL FASCIOCUTANEOUS ADVANCEMENT FLAP AND RIGHT TENSOR FASCIAL JESSICA FLAP;  Surgeon: Amadeo Turner MD;  Location:  [facility];  Service: Plastics;  Laterality: Right;   • LUMBAR DISCECTOMY Right 3/23/2021    Procedure: LUMBAR DISCECTOMY MICRO, Lumbar  1/2 right;  Surgeon: Bandar Shea MD;  Location:  [facility];  Service: Neurosurgery;  Laterality: Right;   • LUMBAR FUSION N/A 1/19/2018    Procedure: L3-4,L4-5 DECOMPRESSION, POSTERIOR SPINAL FUSION WITH INSTRUMENTATION;  Surgeon: Fortino Oropeza MD;  Location:  [facility];  Service:    • LUMBAR LAMINECTOMY WITH FUSION Left 1/17/2018    Procedure: LEFT L3-4 L4-5 LATERAL LUMBAR INTERBODY FUSION;  Surgeon: Fortino Oropeza MD;  Location:  [facility];  Service:    • MYRINGOTOMY W/ TUBES  09/04/2014    TUBES NO LONGER IN PLACE   • OTHER SURGICAL HISTORY      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 so hardware was removed and spacers were placed   • REPLACEMENT TOTAL KNEE Right      PT Assessment (last 12 hours)     PT Evaluation and Treatment     Row Name 06/11/22 0810          Physical Therapy Time and Intention    Document Type therapy note (daily note)  -AB     Mode of Treatment physical therapy  -AB     Row Name 06/11/22 0810          General Information    Existing Precautions/Restrictions fall  -AB     Row Name 06/11/22 0810          Motor Skills    Comments, Therapeutic Exercise PROM Lauro LE, Pt's eyes were open but didn't follow any commands  -AB     Additional Documentation Comments, Therapeutic Exercise (Row)  -AB     Row Name             Wound 06/04/22 0211 Right anterior greater trochanter    Wound - Properties Group Placement Date: 06/04/22  -LV Placement Time: 0211  -LV Present on Hospital Admission: Y  -LV Side: Right  -LV Orientation: anterior  -LV Location: greater trochanter  -LV     Retired Wound - Properties Group Placement Date: 06/04/22  -LV Placement Time: 0211  -LV Present on Hospital Admission: Y  -LV Side: Right  -LV Orientation: anterior  -LV Location: greater trochanter  -LV     Retired Wound - Properties Group Date first assessed: 06/04/22  -LV Time first assessed: 0211  -LV Present on Hospital Admission: Y  -LV Side: Right  -LV Location: greater trochanter  -LV     Row Name              Wound 04/22/22 1858 Left posterior heel Pressure Injury    Wound - Properties Group Placement Date: 04/22/22  -BS Placement Time: 1858 -BS Side: Left  -MT Orientation: posterior  -MT Location: heel  -MT Primary Wound Type: Pressure inj  -MT Removal Date: --  -BS Removal Time: --  -BS Wound Outcome: Healed  -MT     Retired Wound - Properties Group Placement Date: 04/22/22  -BS Placement Time: 1858 -BS Side: Left  -MT Orientation: posterior  -MT Location: heel  -MT Primary Wound Type: Pressure inj  -MT Removal Date: --  -BS Removal Time: --  -BS Wound Outcome: Healed  -MT     Retired Wound - Properties Group Date first assessed: 04/22/22  -BS Time first assessed: 1858 -BS Side: Left  -MT Location: heel  -MT Primary Wound Type: Pressure inj  -MT Resolution Date: --  -BS Resolution Time: --  -BS Wound Outcome: Healed  -MT     Row Name             Wound 04/22/22 1858 Right lateral leg Pressure Injury    Wound - Properties Group Placement Date: 04/22/22  -BS Placement Time: 1858 -BS Present on Hospital Admission: Y  -MT Side: Right  -MT Orientation: lateral  -MT Location: leg  -MT Primary Wound Type: Pressure inj  -MT Removal Date: --  -BS Removal Time: --  -BS     Retired Wound - Properties Group Placement Date: 04/22/22  -BS Placement Time: 1858 -BS Present on Hospital Admission: Y  -MT Side: Right  -MT Orientation: lateral  -MT Location: leg  -MT Primary Wound Type: Pressure inj  -MT Removal Date: --  -BS Removal Time: --  -BS     Retired Wound - Properties Group Date first assessed: 04/22/22  -BS Time first assessed: 1858 -BS Present on Hospital Admission: Y  -MT Side: Right  -MT Location: leg  -MT Primary Wound Type: Pressure inj  -MT Resolution Date: --  -BS Resolution Time: --  -BS     Row Name             Wound 04/22/22 1726 Left anterior greater trochanter    Wound - Properties Group Placement Date: 04/22/22  -MT Placement Time: 1726  -MT Present on Hospital Admission: Y  -MT Side: Left  -MT Orientation:  anterior  -MT Location: greater trochanter  -MT     Retired Wound - Properties Group Placement Date: 04/22/22  -MT Placement Time: 1726  -MT Present on Hospital Admission: Y  -MT Side: Left  -MT Orientation: anterior  -MT Location: greater trochanter  -MT     Retired Wound - Properties Group Date first assessed: 04/22/22  -MT Time first assessed: 1726  -MT Present on Hospital Admission: Y  -MT Side: Left  -MT Location: greater trochanter  -MT     Row Name             Wound 06/04/22 0827 Right anterior hip Incision    Wound - Properties Group Placement Date: 06/04/22  -HW Placement Time: 0827 -HW Side: Right  -HW Orientation: anterior  -HW Location: hip  -HW Primary Wound Type: Incision  -HW     Retired Wound - Properties Group Placement Date: 06/04/22  -HW Placement Time: 0827 -HW Side: Right  -HW Orientation: anterior  -HW Location: hip  -HW Primary Wound Type: Incision  -HW     Retired Wound - Properties Group Date first assessed: 06/04/22  -HW Time first assessed: 0827 -HW Side: Right  -HW Location: hip  -HW Primary Wound Type: Incision  -HW     Row Name             Wound 06/07/22 0414 Right gluteal    Wound - Properties Group Placement Date: 06/07/22  -LV Placement Time: 0414  -LV Side: Right  -LV Location: gluteal  -LV     Retired Wound - Properties Group Placement Date: 06/07/22  -LV Placement Time: 0414  -LV Side: Right  -LV Location: gluteal  -LV     Retired Wound - Properties Group Date first assessed: 06/07/22  -LV Time first assessed: 0414  -LV Side: Right  -LV Location: gluteal  -LV     Row Name             Wound 06/10/22 1530 Right hip Incision    Wound - Properties Group Placement Date: 06/10/22  -TANIKA Placement Time: 1530  -TANIKA Present on Hospital Admission: Y  -TANIKA Side: Right  -TANIKA Location: hip  -TANIKA Primary Wound Type: Incision  -TANIKA     Retired Wound - Properties Group Placement Date: 06/10/22  -TANIKA Placement Time: 1530  -TANIKA Present on Hospital Admission: Y  -TANIKA Side: Right  -TANIKA Location: hip  -TANIKA  Primary Wound Type: Incision  -TANIKA     Retired Wound - Properties Group Date first assessed: 06/10/22  -TANIKA Time first assessed: 1530  -TANIKA Present on Hospital Admission: Y  -TANIKA Side: Right  -TANIKA Location: hip  -TANIKA Primary Wound Type: Incision  -TANIKA     Row Name             NPWT (Negative Pressure Wound Therapy) 06/06/22 1000 R hip    NPWT (Negative Pressure Wound Therapy) - Properties Group Placement Date: 06/06/22  -MS Placement Time: 1000  -MS Location: R hip  -MS     Retired NPWT (Negative Pressure Wound Therapy) - Properties Group Placement Date: 06/06/22  -MS Placement Time: 1000  -MS Location: R hip  -MS     Retired NPWT (Negative Pressure Wound Therapy) - Properties Group Placement Date: 06/06/22  -MS Placement Time: 1000  -MS Location: R hip  -MS     Row Name 06/11/22 0810          Plan of Care Review    Plan of Care Reviewed With patient  -AB     Progress no change  -AB     Outcome Evaluation PT treatment complete. PROM to Lauro LE. Pt didn't follow any commands this AM. Will cont to follow.  -AB     Row Name 06/11/22 0810          Positioning and Restraints    Pre-Treatment Position in bed  -AB     Post Treatment Position bed  -AB     In Bed supine;call light within reach  -AB           User Key  (r) = Recorded By, (t) = Taken By, (c) = Cosigned By    Initials Name Provider Type    AB Kasie Perkins, PTA Physical Therapist Assistant    MS Wesley Nicole EVERARDO, PT, DPT, NCS Physical Therapist    Ranjana Sin, RN Registered Nurse    Ewelina Eubanks RN Registered Nurse    Leeann Younger RN Registered Nurse    Benjie Melendez RN Registered Nurse    Consuelo Rodríguez RN Registered Nurse                Physical Therapy Education                 Title: PT OT SLP Therapies (In Progress)     Topic: Physical Therapy (In Progress)     Point: Mobility training (In Progress)     Learning Progress Summary           Patient Nonacceptance, E, NR by AB at 6/11/2022 0842                   Point: Home  exercise program (In Progress)     Learning Progress Summary           Patient Nonacceptance, E, NR by AB at 6/11/2022 0842                   Point: Body mechanics (In Progress)     Learning Progress Summary           Patient Nonacceptance, E, NR by AB at 6/11/2022 0842                   Point: Precautions (In Progress)     Learning Progress Summary           Patient Nonacceptance, E, NR by AB at 6/11/2022 0842    Acceptance, E, VU by MS at 6/6/2022 1138    Comment: wound vac role in pt care and healing                               User Key     Initials Effective Dates Name Provider Type Discipline    AB 08/02/16 -  Kasie Perkins, PTA Physical Therapist Assistant PT    MS 06/19/18 -  Nicole Olson, PT, DPT, NCS Physical Therapist PT              PT Recommendation and Plan     Plan of Care Reviewed With: patient  Progress: no change  Outcome Evaluation: PT treatment complete. PROM to Lauro LE. Pt didn't follow any commands this AM. Will cont to follow.   Outcome Measures     Row Name 06/11/22 0810 06/09/22 1000          How much help from another person do you currently need...    Turning from your back to your side while in flat bed without using bedrails? 3  -AB 3  -LY     Moving from lying on back to sitting on the side of a flat bed without bedrails? 1  -AB 1  -LY     Moving to and from a bed to a chair (including a wheelchair)? 1  -AB 1  -LY     Standing up from a chair using your arms (e.g., wheelchair, bedside chair)? 1  -AB 1  -LY     Climbing 3-5 steps with a railing? 1  -AB 1  -LY     To walk in hospital room? 1  -AB 1  -LY     AM-PAC 6 Clicks Score (PT) 8  -AB 8  -LY            Functional Assessment    Outcome Measure Options AM-PAC 6 Clicks Basic Mobility (PT)  -AB AM-PAC 6 Clicks Basic Mobility (PT)  -LY           User Key  (r) = Recorded By, (t) = Taken By, (c) = Cosigned By    Initials Name Provider Type    AB Kasie Perkins PTA Physical Therapist Assistant    Bianca Hallman PTA Physical  Therapist Assistant                 Time Calculation:    PT Charges     Row Name 06/11/22 0810             Time Calculation    Start Time 0810  -AB      Stop Time 0838  -AB      Time Calculation (min) 28 min  -AB      PT Received On 06/11/22  -AB              Time Calculation- PT    Total Timed Code Minutes- PT 28 minute(s)  -AB            User Key  (r) = Recorded By, (t) = Taken By, (c) = Cosigned By    Initials Name Provider Type    AB Kasie Perkins, PTA Physical Therapist Assistant              Therapy Charges for Today     Code Description Service Date Service Provider Modifiers Qty    [id number] HC PT THER PROC EA 15 MIN 6/11/2022 Kasie Perkins PTA GP 2          PT G-Codes  Outcome Measure Options: AM-PAC 6 Clicks Basic Mobility (PT)  AM-PAC 6 Clicks Score (PT): 8    Kasie Perkins PTA  6/11/2022

## 2022-06-11 NOTE — PROGRESS NOTES
[facility] Medicine Services  INPATIENT PROGRESS NOTE    Patient Name: Tawny Shin  Date of Admission: 6/3/2022  Today's Date: 06/11/22  Length of Stay: 7  Primary Care Physician: Leta Julian DO    Subjective   Chief Complaint: Confusion  Hip Pain     Her mental status has not improved.  She will open her eyes to verbal and tactile stimuli.  I cannot get her to follow any commands.  I cannot get her to articulate any words.  Her sister is at bedside and reports that she has not had any interaction with her as well.  It sounds like she has not had hardly anything to eat or drink in a couple of days.  Dr. Turner did take her to the operating room yesterday and case has been discussed with him recently -her right hip issues do not explain what is driving her change in mental status.  An MRI of the brain was performed last night as well; no acute findings.    Review of Systems     All pertinent negatives and positives are as above. All other systems have been reviewed and are negative unless otherwise stated.     Objective    Temp:  [96.8 °F (36 °C)-98.3 °F (36.8 °C)] 98 °F (36.7 °C)  Heart Rate:  [58-67] 60  Resp:  [14-18] 14  BP: (120-179)/(54-98) 154/58  Physical Exam  Vitals reviewed.   Constitutional:       Appearance: She is ill-appearing.   HENT:      Head: Normocephalic.      Mouth/Throat:      Mouth: Mucous membranes are dry.      Pharynx: No oropharyngeal exudate.      Comments: Keeps mouth closed; clenched at times  Eyes:      Pupils: Pupils are equal, round, and reactive to light.   Cardiovascular:      Rate and Rhythm: Normal rate.   Pulmonary:      Effort: Pulmonary effort is normal. No respiratory distress.      Comments: On nasal cannula; diminished at bases.  Appeared to have a congestive cough during my exam.  Abdominal:      General: There is no distension.      Palpations: Abdomen is soft.      Tenderness: There is no abdominal tenderness (no  grimacing this AM).      Comments: Some grimacing was evident with palpation of her abdomen but this appeared nonfocal.   Genitourinary:     Comments: Chronic kay; urine starting to become more dark and concentrated  Musculoskeletal:      Cervical back: Neck supple.   Skin:     General: Skin is warm.      Comments: Wound right hip (dressing in place)   Neurological:      General: No focal deficit present.      Mental Status: She is alert.      Motor: Weakness present.      Comments: Her eyes open to verbal and tactile stimuli, she kept her eyes open throughout the duration of my assessment.  She is lying on her left side.  I cannot get her to follow any commands.  I cannot get her to articulate any words.  She kept her mouth closed and at times it appeared like her jaw was clenched.  With passive range of motion of the upper extremities some mild twitching was noted.   Psychiatric:         Mood and Affect: Mood normal.         Results Review:  I have reviewed the labs, radiology results, and diagnostic studies.    Laboratory Data:   Results from last 7 days   Lab Units 06/11/22  0613 06/10/22  0929 06/08/22  0700   WBC 10*3/mm3 5.75 5.57 4.39   HEMOGLOBIN g/dL 8.4* 8.9* 8.4*   HEMATOCRIT % 27.9* 29.4* 27.8*   PLATELETS 10*3/mm3 205 201 225        Results from last 7 days   Lab Units 06/11/22  0613 06/10/22  1019 06/10/22  0929   SODIUM mmol/L 138 135* 135*   POTASSIUM mmol/L 3.6 3.7 3.8   CHLORIDE mmol/L 103 101 101   CO2 mmol/L 26.0 28.0 28.0   BUN mg/dL 13 12 11   CREATININE mg/dL 0.76 0.64 0.67   CALCIUM mg/dL 8.5* 8.5* 8.5*   BILIRUBIN mg/dL 0.3 0.3 0.3   ALK PHOS U/L 84 93 93   ALT (SGPT) U/L 6 8 8   AST (SGOT) U/L 13 13 15   GLUCOSE mg/dL 83 107* 109*       Culture Data:   Blood Culture   Date Value Ref Range Status   06/03/2022 No growth at 2 days  Preliminary   06/03/2022 No growth at 2 days  Preliminary     Urine Culture   Date Value Ref Range Status   06/04/2022 >100,000 CFU/mL Pseudomonas aeruginosa (A)   Final   06/04/2022 >100,000 CFU/mL Escherichia coli (A)  Final     Wound Culture   Date Value Ref Range Status   06/04/2022 Scant growth (1+) Staphylococcus aureus (A)  Final   06/04/2022 Light growth (2+) Staphylococcus aureus (A)  Final       Radiology Data:   Imaging Results (Last 24 Hours)     Procedure Component Value Units Date/Time    MRI Brain With & Without Contrast [[id number]] Collected: 06/10/22 1943     Updated: 06/10/22 1959    Narrative:      EXAMINATION:  MRI BRAIN W WO CONTRAST-  6/10/2022 7:00 PM CDT     HISTORY: Mental status change, unknown cause.     TECHNIQUE: Multiplanar imaging was performed in a high field magnet  before and after gadolinium contrast administration.     COMPARISON: 2/9/2021.     FINDINGS: There is no evidence of acute infarct on the  diffusion-weighted sequence. There are scattered areas of T2 high signal  within the hemispheric periventricular white matter. There is a rim of  T2 high signal seen best on the FLAIR sequence around the lateral  ventricles. There is mild atrophy. There is moderate dilatation of the  lateral and third ventricles. There are no mass lesions. There are no  areas of abnormal contrast enhancement intracranially. There is a  partially empty appearance of the sella turcica. There is a small amount  of fluid in the optic nerve sheaths. The transverse sinuses are normal  in size. There is mucosal thickening in the paranasal sinuses  predominantly in the maxillary, ethmoid and sphenoid sinuses. There is  T1 high signal within the maxillary and sphenoid sinuses and to a lesser  extent within the posterior left ethmoid sinus. These areas demonstrate  relative flow void on T2 images. There is mucosal enhancement of the  paranasal sinuses.       Impression:      1. Scattered areas of T2 high signal within the hemispheric white matter  are nonspecific and likely due to chronic small vessel disease.  2. Mild atrophy.  3. There is moderate dilatation of the  lateral and third ventricles.  This may be related to the atrophy. There is a band of T2 high signal  seen best on the FLAIR sequence surrounding the lateral ventricles  suggesting possible transependymal flow. Hydrocephalus is not ruled out.  4. Sinus disease, as described. T1 high signal within the maxillary,  sphenoid and posterior left ethmoid region could be related to  inspissated secretions. Fungal sinusitis is also in the differential  diagnosis.     This report was finalized on 06/10/2022 19:56 by Dr. Zurdo Vazquez MD.          I have reviewed the patient's current medications.     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  MRI brain performed last PM; I cannot appreciate any acute findings  2.  CXR today  3.  NPO  4.  Start IVFs  5.  ST evaluation; would like speech assessment prior to restarting PO diet given current clinical condition  6.  EEG today  7.  Follow-up blood cultures  8.  Repeat UA unremarkable  9.  Recent gallbladder ultrasound with 9mm calculus in neck of GB -her comprehensive metabolic profile and liver function tests have been unremarkable.    10.  Antibiotics transitioned from Cefepime to Cefazolin - appreciate ID assistance  11.  Horan catheter changed during this hospitalization  12.  IV Labetolol PRN; add scheduled Vasotec; unable to take her PO anti-HTN medications   13.  Monitor BP trend  closely  14.  Monitor neuro status closely  15.  Wound care recommendations by Dr. Turner  16.  Patient unable to take PO Eliquis at this time; will add Lovenox at 40mg BID dosing for now  17.  Workup continues particularly as it pertains to worsening AMS and encephalopathy    Electronically signed by Butch Chong MD, 06/11/22, 10:22 CDT.

## 2022-06-11 NOTE — PROGRESS NOTES
Infectious Diseases Progress Note    Patient:  Tawny Shin  YOB: 1955  MRN: [id number]   Admit date: 6/3/2022   Admitting Physician: Butch Chong MD  Primary Care Physician: Leta Julian DO    Chief Complaint/Interval History: Sisters at bedside.  Tawny will open eyes.  At times she will follow simple commands slowly.  At other times she does not seem to follow commands.  She does track with her eyes.  She has not had fever.  Heart rate has been in the 50s and 60s.  Blood pressure has been in the 150/45-60 range.  She remains stable from a pulmonary standpoint.  Oxygen saturations 97% on 2 L.  Interval notes reviewed.  Operative note reviewed.    Intake/Output Summary (Last 24 hours) at 6/11/2022 0726  Last data filed at 6/11/2022 0300  Gross per 24 hour   Intake 400 ml   Output 1025 ml   Net -625 ml     Allergies:   Allergies   Allergen Reactions   • Atorvastatin Other (See Comments)     LEG CRAMPS     • Amoxicillin Rash   • Escitalopram Rash   • Nabumetone Rash   • Niacin Er Rash   • Penicillin G Rash   • Penicillins Rash   • Simvastatin Rash     Current Scheduled Medications:   apixaban, 5 mg, Oral, Q12H  ascorbic acid, 500 mg, Oral, Daily  aspirin, 81 mg, Oral, Daily  bisacodyl, 10 mg, Rectal, Daily  carvedilol, 25 mg, Oral, BID With Meals  ceFAZolin, 2 g, Intravenous, Q8H  collagenase, 1 application, Topical, Q24H  docusate sodium, 100 mg, Oral, BID  DULoxetine, 60 mg, Oral, Daily  famotidine, 40 mg, Oral, Daily  ferrous gluconate, 324 mg, Oral, Daily With Breakfast  fluticasone, 1 spray, Nasal, Daily  furosemide, 40 mg, Oral, Daily  isosorbide mononitrate, 60 mg, Oral, Daily  levothyroxine, 75 mcg, Oral, Q AM  lidocaine, 1 patch, Transdermal, Q24H  lisinopril, 10 mg, Oral, Q24H  magnesium citrate, 150 mL, Oral, Once  magnesium oxide, 400 mg, Oral, Daily  methylnaltrexone, 12 mg, Subcutaneous, Every Other Day  multivitamin with minerals, 1 tablet, Oral, Daily  polyethylene  "glycol, 17 g, Oral, Daily  saccharomyces boulardii, 250 mg, Oral, Daily  sennosides-docusate, 2 tablet, Oral, BID  sodium chloride, 10 mL, Intravenous, Q12H  sucralfate, 1 g, Oral, 4x Daily AC & at Bedtime  thiamine, 100 mg, Oral, Daily  valACYclovir, 500 mg, Oral, Daily      Current PRN Medications:  •  acetaminophen  •  Diclofenac Sodium  •  droperidol  •  HYDROcodone-acetaminophen  •  hydrocortisone-bacitracin-zinc oxide-nystatin  •  labetalol  •  [MAR Hold] magnesium hydroxide  •  nitroglycerin  •  ondansetron **OR** ondansetron  •  [COMPLETED] Insert peripheral IV **AND** sodium chloride  •  sodium chloride  •  traMADol    Review of Systems see HPI.  Unobtainable from patient.    Vital Signs:  /55 (BP Location: Right arm, Patient Position: Lying)   Pulse 67   Temp 97.5 °F (36.4 °C) (Axillary)   Resp 16   Ht 167.6 cm (66\")   Wt 81.1 kg (178 lb 12.7 oz)   LMP  (LMP Unknown)   SpO2 92%   BMI 28.86 kg/m²     Physical Exam  Vital signs - reviewed.  Line/IV site - No erythema, warmth, induration, or tenderness.  She looks comfortable.  She does not appear to be in distress.  Lungs are clear  Abdomen does not seem to be tender  Right hip dressing clean and dry    Lab Results:  CBC:   Results from last 7 days   Lab Units 06/10/22  0929 06/08/22  0700 06/06/22  0658 06/04/22  1111   WBC 10*3/mm3 5.57 4.39 4.74 5.21   HEMOGLOBIN g/dL 8.9* 8.4* 8.6* 7.9*   HEMATOCRIT % 29.4* 27.8* 28.9* 26.3*   PLATELETS 10*3/mm3 201 225 220 222     BMP:  Results from last 7 days   Lab Units 06/10/22  1019 06/10/22  0929 06/09/22  0711 06/08/22  0700 06/06/22  0658 06/04/22  1111   SODIUM mmol/L 135* 135* 136 138 135* 138   POTASSIUM mmol/L 3.7 3.8 3.7 3.6 4.2 3.7   CHLORIDE mmol/L 101 101 101 104 101 102   CO2 mmol/L 28.0 28.0 29.0 31.0* 31.0* 27.0   BUN mg/dL 12 11 11 11 13 15   CREATININE mg/dL 0.64 0.67 0.74 0.64 0.72 0.80   GLUCOSE mg/dL 107* 109* 116* 99 126* 139*   CALCIUM mg/dL 8.5* 8.5* 8.8 8.6 8.6 8.5*   ALT (SGPT) " U/L 8 8 10 10 12 11     CRP 10.9  Ammonia 13  SARS-CoV-2 testing-negative    Culture Results:   Wound Culture   Date Value Ref Range Status   06/04/2022 Scant growth (1+) Staphylococcus aureus (A)  Final     Right hip bone culture from [facility] 10, 2022:  Rare white blood cells, no organisms  Cultures no growth    Radiology:     MRI brain and brainstem:  IMPRESSION:  1. Scattered areas of T2 high signal within the hemispheric white matter  are nonspecific and likely due to chronic small vessel disease.  2. Mild atrophy.  3. There is moderate dilatation of the lateral and third ventricles.  This may be related to the atrophy. There is a band of T2 high signal  seen best on the FLAIR sequence surrounding the lateral ventricles  suggesting possible transependymal flow. Hydrocephalus is not ruled out.  4. Sinus disease, as described. T1 high signal within the maxillary,  sphenoid and posterior left ethmoid region could be related to  inspissated secretions. Fungal sinusitis is also in the differential  diagnosis.     This report was finalized on 06/10/2022 19:56 by Dr. Zurdo Vazquez MD.    MRI pelvis:  IMPRESSION:  1. Enhancing soft tissue lateral to the proximal right femur containing  a small amount of fluid signal centrally likely represents phlegmon with  possible early development of abscess formation.  2. There is abnormal enhancement that extends into the proximal right  femur in the greater trochanter region with surrounding edema. This  extends from the collection lateral to the right proximal femur. This  likely represents a sinus tract extending into the femur. There is some  edema in this area as well. The findings are consistent with  osteomyelitis.  3. There is a 2 cm uterine leiomyoma. There is presacral edema.   This report was finalized on 06/08/2022 17:21 by Dr. Zurdo Vazquez MD.    Additional Studies Reviewed: None    Impression:   1.  Right hip-soft tissue infection right hip area.  Possible chronic  osteomyelitis involving the femur.  2.  Encephalopathy-etiology uncertain at present.  3.  Hypertension  4.  Rheumatoid arthritis  5.  Cholelithiasis     Recommendations:   Continue treatment with cefazolin  EEG today  Await operative cultures of bone  Continue supportive care  Continue attempts to work through possible causes of encephalopathy-definite etiology uncertain at present    Elie Ohara MD

## 2022-06-11 NOTE — PLAN OF CARE
Goal Outcome Evaluation:  Plan of Care Reviewed With: patient        Progress: no change  Outcome Evaluation: PT treatment complete. PROM to Lauro DANGELO. Pt didn't follow any commands this AM. Will cont to follow.

## 2022-06-11 NOTE — PLAN OF CARE
Goal Outcome Evaluation:  Plan of Care Reviewed With: patient, family            Patient able to open her eyes with verbal and tactile stimuli. Vitals stable -systolic mild high. O2 @ 2 L NC. Weaned to 0.5 L. Tolerating well. Lethargic.Does not follow commands. Non verbal. Nods her head at a times. Clenches her both jaws tight . Unable to swallow. MD notified and updated the status of patient. MD Placed new orders for NPO and SLP eval. SLP not able to complete study as patient does not open her mouth. IVF started as ordered. IV BP med given. Incision site at right hip dressing changed by Dr Turner this morning. Dsg change Q 12 hrs. Chronic kay catheter present. Draining yellow color urine. NO BM today. Sisters at bedside. Supportive to patient. Safety precautions maintained. Continue to monitor.

## 2022-06-11 NOTE — PLAN OF CARE
Goal Outcome Evaluation:  Plan of Care Reviewed With: patient        Progress: no change  Patient confused and lethargic over night. Room air and vitals are WNL. Dressings are CDI. Patient being turned throughout the night. Call bell in reach and safety measures maintained.

## 2022-06-11 NOTE — THERAPY EVALUATION
Acute Care - Speech Language Pathology   Swallow Initial Evaluation [facility]     Patient Name: Tawny Shin  : 1955  MRN: [id number]  Today's Date: 2022               Admit Date: 6/3/2022  Clinical bedside swallow evaluation completed. The patient was alert and poorly cooperative given cognitive status. 2 sisters present at bedside. She was able to say a few words and look when name was called but otherwise did not follow any of my commands.     Primary problem: R hip surgical site drainage s/p I&D.    Oral motor assessment unable to be completed fully due to cognitive status. The patient attempted PO trials of water via straw and pinched straw. Patient bit down on straw instead of sucking. 1x puree given via spoon. Patient opened to receive trial but only held it in her mouth. SLP used toothette to remove lingual residue.     SLP recommends patient remain NPO until cognitive status improves. Will follow up to reassess tomorrow.   Nicole Acuña MS CCC-SLP 2022 13:06 CDT    Visit Dx:     ICD-10-CM ICD-9-CM   1. Abscess of right hip  L02.415 682.6   2. Impaired mobility  Z74.09 799.89   3. Abscess of hip, right  L02.415 682.6   4. Other dysphagia  R13.19 787.29     Patient Active Problem List   Diagnosis   • T12 compression fracture, initial encounter (HCC)   • Chronic embolism and thrombosis of unspecified deep veins of left lower extremity (HCC)   • Urinary tract infection without hematuria   • Acute bilateral thoracic back pain   • Chronic anticoagulation   • Hypokalemia   • Transaminitis   • Iron deficiency anemia   • Osteoporosis   • Bacteremia   • Epidural hematoma (HCC)   • Pleural effusion, left   • Functional neurological symptom disorder with weakness or paralysis   • Overweight (BMI 25.0-29.9)   • Rheumatoid arthritis involving multiple sites (HCC)   • (HFpEF) heart failure with preserved ejection fraction (HCC), acute on chronic   • Venous insufficiency   • Coronary artery  disease involving native coronary artery of native heart without angina pectoris   • Sick sinus syndrome ([facility])   • Essential hypertension   • Pressure injury of skin of heel   • Abscess, right hip   • Chronic pain   • Chronic indwelling Horan catheter   • Abscess of right hip   • Anemia of chronic disease   • Cholelithiasis   • Encephalopathy, toxic     Past Medical History:   Diagnosis Date   • Age-related osteoporosis with current pathological fracture 5/27/2020   • Arthritis    • Asthma    • Bilateral bunions 12/23/2020   • Cancer ([facility])    • Cardiac pacemaker syndrome 12/23/2020    Overview:  - heart block - implanted 11/16   • Charcot's joint of foot, left 12/23/2020   • Chronic deep vein thrombosis (DVT) of right lower extremity ([facility]) 6/23/2021   • Chronic pain syndrome 6/22/2021   • Chronic sinusitis    • COPD (chronic obstructive pulmonary disease) (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)    • Myalgia due to statin 6/25/2019   • Open wound of right hip 9/14/2021   • Osteomyelitis of right femur ([facility]) 7/6/2021   • Otorrhea    • Pacemaker 11/17/2016   • Primary osteoarthritis of left knee 12/23/2020   • Psoriasis vulgaris 12/23/2020   • S/P coronary artery stent placement 3/9/2016   • Sensorineural hearing loss    • Seropositive rheumatoid arthritis of multiple sites ([facility]) 12/27/2019    Overview:  -myochrysine '93-'96 -methotrexate '96--->11/98;r/s  restarted 2/99--> 8/14 (anemia) -sulfasalazine- not effective -penicillamine 6/98-->10/98; no effect -leflunomide 11/98--> -  "Humira '13-->didn't take - Enbrel 12/14-->3/15- no effect!   Last Assessment & Plan:  - \"aching all over\" because she had to be off her anti-rheumatic drugs for 2 weeks in preparation for her R knee surgery - he   • Sick sinus syndrome (HCC) 12/27/2019   • Sjogren's disease (HCC)    • Spondylolisthesis of lumbar region 1/17/2018   • Syncope, recurrent 2/8/2021     Past Surgical History:   Procedure Laterality Date   • A-V CARDIAC PACEMAKER INSERTION  2016   • ATRIAL CARDIAC PACEMAKER INSERTION     • CARDIAC CATHETERIZATION     • CATARACT EXTRACTION     • CERVICAL CORPECTOMY N/A 3/3/2021    Procedure: CERVICAL 6 CORPECTOMY WITH TITANIUM CAGE WITH NEURO MONITORING;  Surgeon: Bandar Shea MD;  Location:  [facility] OR;  Service: Neurosurgery;  Laterality: N/A;   • COLONOSCOPY  11/08/2011    One fold in the ascending colon which showed ulcer otherwise normal exam   • COLONOSCOPY  11/12/2004    Normal exam repeat in five years   • CORONARY ANGIOPLASTY WITH STENT PLACEMENT      X 2; 2013 & 2014   • ENDOSCOPY  07/10/2014    Normal exam   • FLAP LEG Right 9/14/2021    Procedure: RIGHT GLUTEAL FASCIOCUTANEOUS ADVANCEMENT FLAP AND RIGHT TENSOR FASCIAL JESSICA FLAP;  Surgeon: Amadeo Turner MD;  Location:  [facility] OR;  Service: Plastics;  Laterality: Right;   • HIP ABDUCTION TENOTOMY BILATERAL Right 1/14/2021    Procedure: RIGHT HIP GLUTEUS MEDLUS / MINIMUS REPAIR, POSSIBLE ACHILLES ALLOGRAFT;  Surgeon: Nino Carlson MD;  Location:  [facility] OR;  Service: Orthopedics;  Laterality: Right;   • INCISION AND DRAINAGE ABSCESS Right 6/4/2022    Procedure: INCISION AND DRAINAGE ABSCESS right hip;  Surgeon: Magda Salcido MD;  Location:  [facility] OR;  Service: General;  Laterality: Right;   • INCISION AND DRAINAGE HIP Right 2/9/2021    Procedure: HIP INCISION AND DRAINAGE;  Surgeon: Nino Carlson MD;  Location:  [facility] OR;  Service: Orthopedics;  Laterality: Right;   • INCISION AND DRAINAGE LEG Right 10/24/2021    Procedure: INCISION " AND DRAINAGE LOWER EXTREMITY;  Surgeon: Amadeo Turner MD;  Location:  [facility] OR;  Service: Plastics;  Laterality: Right;   • INCISION AND DRAINAGE OF WOUND Right 7/8/2021    Procedure: INCISION AND DRAINAGE WOUND RIGHT HIP;  Surgeon: James Huntley MD;  Location:  [facility] OR;  Service: Orthopedics;  Laterality: Right;   • JOINT REPLACEMENT     • KYPHOPLASTY WITH BIOPSY Bilateral 10/26/2021    Procedure: THOARCIC 12 KYPHOPLASTY WITH BIOPSY;  Surgeon: Bandar Shea MD;  Location:  [facility];  Service: Neurosurgery;  Laterality: Bilateral;   • LEG DEBRIDEMENT Right 9/14/2021    Procedure: DEBRIDEMENT OF RIGHT HIP WOUND, RIGHT GLUTEAL FASCIOCUTANEOUS ADVANCEMENT FLAP AND RIGHT TENSOR FASCIAL JESSICA FLAP;  Surgeon: Amadeo Turner MD;  Location:  [facility] OR;  Service: Plastics;  Laterality: Right;   • LUMBAR DISCECTOMY Right 3/23/2021    Procedure: LUMBAR DISCECTOMY MICRO, Lumbar 1/2 right;  Surgeon: Bandar Shea MD;  Location:  [facility] OR;  Service: Neurosurgery;  Laterality: Right;   • LUMBAR FUSION N/A 1/19/2018    Procedure: L3-4,L4-5 DECOMPRESSION, POSTERIOR SPINAL FUSION WITH INSTRUMENTATION;  Surgeon: Fortino Oropeza MD;  Location: [facility] OR;  Service:    • LUMBAR LAMINECTOMY WITH FUSION Left 1/17/2018    Procedure: LEFT L3-4 L4-5 LATERAL LUMBAR INTERBODY FUSION;  Surgeon: Fortino Oropeza MD;  Location: [facility] OR;  Service:    • MYRINGOTOMY W/ TUBES  09/04/2014    TUBES NO LONGER IN PLACE   • OTHER SURGICAL HISTORY      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 so hardware was removed and spacers were placed   • REPLACEMENT TOTAL KNEE Right        SLP Recommendation and Plan  SLP Swallowing Diagnosis: mod-severe, oral dysphagia, R/O pharyngeal dysphagia (06/11/22 1200)  SLP Diet Recommendation: NPO (06/11/22 1200)     SLP Rec. for Method of Medication Administration: meds via alternate route (06/11/22 1200)     Monitor for Signs of Aspiration: yes, notify SLP if any concerns (06/11/22  1200)  Recommended Diagnostics: reassess via clinical swallow evaluation (06/11/22 1200)  Swallow Criteria for Skilled Therapeutic Interventions Met: demonstrates skilled criteria (06/11/22 1200)  Anticipated Discharge Disposition (SLP): unknown (06/11/22 1200)  Rehab Potential/Prognosis, Swallowing: adequate, monitor progress closely (06/11/22 1200)  Therapy Frequency (Swallow): at least, 3 days per week (06/11/22 1200)  Predicted Duration Therapy Intervention (Days): until discharge (06/11/22 1200)                                  Plan of Care Reviewed With: patient, caregiver, family (SARAH Kilpatrick)  Progress: no change (Initial Evaluation)      SWALLOW EVALUATION (last 72 hours)     SLP Adult Swallow Evaluation     Row Name 06/11/22 1200                   Rehab Evaluation    Document Type evaluation  -MM        Subjective Information no complaints  -MM        Patient Observations alert;poorly cooperative;agree to therapy  -MM        Patient/Family/Caregiver Comments/Observations 2 sisters present.  -MM        Patient Effort good  -MM        Symptoms Noted During/After Treatment none  -MM                  General Information    Patient Profile Reviewed yes  -MM        Pertinent History Of Current Problem Primary problem: R hip surgical site drainage s/p I&D.  -MM        Current Method of Nutrition NPO  -MM        Precautions/Limitations, Vision WFL;for purposes of eval  -MM        Precautions/Limitations, Hearing hearing impairment, bilaterally  sensorineural  -MM        Prior Level of Function-Communication WFL  -MM        Prior Level of Function-Swallowing no diet consistency restrictions  -MM        Plans/Goals Discussed with patient and family;other (see comments);agreed upon  SARAH Kilpatrick  -MM        Barriers to Rehab cognitive status  -MM        Patient's Goals for Discharge patient could not state  -MM        Family Goals for Discharge patient able to return to PO diet;patient able to return to all previous  activities/roles  -MM                  Pain    Additional Documentation Pain Scale: FACES Pre/Post-Treatment (Group)  -MM                  Pain Scale: FACES Pre/Post-Treatment    Pain: FACES Scale, Pretreatment 0-->no hurt  -MM        Posttreatment Pain Rating 0-->no hurt  -MM                  Oral Motor Structure and Function    Dentition Assessment natural, present and adequate  -MM        Secretion Management dried secretions in oral cavity  -MM        Mucosal Quality sticky  -MM        Volitional Swallow unable to elicit  -MM        Volitional Cough unable to elicit  -MM                  Oral Musculature and Cranial Nerve Assessment    Oral Motor General Assessment generalized oral motor weakness  -MM                  General Eating/Swallowing Observations    Eating/Swallowing Skills fed by SLP  -MM        Positioning During Eating upright in bed  -MM        Utensils Used spoon;straw  -MM        Consistencies Trialed pureed;thin liquids  -MM                  Clinical Swallow Eval    Oral Prep Phase impaired  -MM        Oral Transit impaired  -MM        Oral Residue impaired  -MM        Pharyngeal Phase no overt signs/symptoms of pharyngeal impairment  -MM        Esophageal Phase unremarkable  -MM        Clinical Swallow Evaluation Summary See note  -MM                  Oral Prep Concerns    Oral Prep Concerns oral holding  -MM        Oral Holding pudding;thin  -MM                  Oral Transit Concerns    Oral Transit Concerns unable to initiate oral transit  -MM                  Oral Residue Concerns    Oral Residue Concerns diffuse residue throughout oral cavity  -MM        Diffuse Residue Throughout Oral Cavity pudding  -MM                  SLP Evaluation Clinical Impression    SLP Swallowing Diagnosis mod-severe;oral dysphagia;R/O pharyngeal dysphagia  -MM        Functional Impact risk of aspiration/pneumonia;risk of malnutrition;risk of dehydration  -MM        Rehab Potential/Prognosis, Swallowing adequate,  monitor progress closely  -MM        Swallow Criteria for Skilled Therapeutic Interventions Met demonstrates skilled criteria  -MM                  Recommendations    Therapy Frequency (Swallow) at least;3 days per week  -MM        Predicted Duration Therapy Intervention (Days) until discharge  -MM        SLP Diet Recommendation NPO  -MM        Recommended Diagnostics reassess via clinical swallow evaluation  -MM        Oral Care Recommendations Oral Care BID/PRN;Suction toothbrush  -MM        SLP Rec. for Method of Medication Administration meds via alternate route  -MM        Monitor for Signs of Aspiration yes;notify SLP if any concerns  -MM        Anticipated Discharge Disposition (SLP) unknown  -MM                  Swallow Goals (SLP)    Oral Nutrition/Hydration Goal Selection (SLP) oral nutrition/hydration, SLP goal 1  -MM                  Oral Nutrition/Hydration Goal 1 (SLP)    Oral Nutrition/Hydration Goal 1, SLP LTG: Patient will tolerate LRD without s/s of aspiration.  -MM        Time Frame (Oral Nutrition/Hydration Goal 1, SLP) by discharge  -MM        Barriers (Oral Nutrition/Hydration Goal 1, SLP) cognition  -MM        Progress/Outcomes (Oral Nutrition/Hydration Goal 1, SLP) goal ongoing  -MM              User Key  (r) = Recorded By, (t) = Taken By, (c) = Cosigned By    Initials Name Effective Dates    MM Nicole Acuña, MS CCC-SLP 06/16/21 -                 EDUCATION  The patient has been educated in the following areas:   Dysphagia (Swallowing Impairment) Oral Care/Hydration NPO rationale.        SLP GOALS     Row Name 06/11/22 1200             Oral Nutrition/Hydration Goal 1 (SLP)    Oral Nutrition/Hydration Goal 1, SLP LTG: Patient will tolerate LRD without s/s of aspiration.  -MM      Time Frame (Oral Nutrition/Hydration Goal 1, SLP) by discharge  -MM      Barriers (Oral Nutrition/Hydration Goal 1, SLP) cognition  -MM      Progress/Outcomes (Oral Nutrition/Hydration Goal 1, SLP) goal ongoing   -MM            User Key  (r) = Recorded By, (t) = Taken By, (c) = Cosigned By    Initials Name Provider Type    Nicole Tamayo MS CCC-SLP Speech and Language Pathologist                   Time Calculation:    Time Calculation- SLP     Row Name 06/11/22 1304             Time Calculation- SLP    SLP Start Time 1200  -MM      SLP Stop Time 1255  -MM      SLP Time Calculation (min) 55 min  -MM      SLP Received On 06/11/22  -MM      SLP Goal Re-Cert Due Date 06/21/22  -MM              Untimed Charges    SLP Eval/Re-eval  ST Eval Oral Pharyng Swallow - 55245  -MM      [id number] Eval Oral Pharyng Swallow Minutes 55  -MM              Total Minutes    Untimed Charges Total Minutes 55  -MM       Total Minutes 55  -MM            User Key  (r) = Recorded By, (t) = Taken By, (c) = Cosigned By    Initials Name Provider Type    Nicole Tamayo MS CCC-SLP Speech and Language Pathologist                Therapy Charges for Today     Code Description Service Date Service Provider Modifiers Qty    [id number]  ST EVAL ORAL PHARYNG SWALLOW 4 6/11/2022 Nicole Acuña MS CCC-SLP GN 1               iNcole Acuña MS CCC-GT  6/11/2022

## 2022-06-12 LAB
ALBUMIN SERPL-MCNC: 2.3 G/DL (ref 3.5–5.2)
ALBUMIN/GLOB SERPL: 0.5 G/DL
ALP SERPL-CCNC: 84 U/L (ref 39–117)
ALT SERPL W P-5'-P-CCNC: 6 U/L (ref 1–33)
ANION GAP SERPL CALCULATED.3IONS-SCNC: 6 MMOL/L (ref 5–15)
AST SERPL-CCNC: 14 U/L (ref 1–32)
BASOPHILS # BLD AUTO: 0.03 10*3/MM3 (ref 0–0.2)
BASOPHILS NFR BLD AUTO: 0.7 % (ref 0–1.5)
BILIRUB SERPL-MCNC: 0.2 MG/DL (ref 0–1.2)
BUN SERPL-MCNC: 13 MG/DL (ref 8–23)
BUN/CREAT SERPL: 18.3 (ref 7–25)
CALCIUM SPEC-SCNC: 8.5 MG/DL (ref 8.6–10.5)
CHLORIDE SERPL-SCNC: 107 MMOL/L (ref 98–107)
CO2 SERPL-SCNC: 27 MMOL/L (ref 22–29)
CREAT SERPL-MCNC: 0.71 MG/DL (ref 0.57–1)
DEPRECATED RDW RBC AUTO: 51.2 FL (ref 37–54)
EGFRCR SERPLBLD CKD-EPI 2021: 92.7 ML/MIN/1.73
EOSINOPHIL # BLD AUTO: 0.19 10*3/MM3 (ref 0–0.4)
EOSINOPHIL NFR BLD AUTO: 4.7 % (ref 0.3–6.2)
ERYTHROCYTE [DISTWIDTH] IN BLOOD BY AUTOMATED COUNT: 16 % (ref 12.3–15.4)
GLOBULIN UR ELPH-MCNC: 4.6 GM/DL
GLUCOSE SERPL-MCNC: 93 MG/DL (ref 65–99)
HCT VFR BLD AUTO: 27.7 % (ref 34–46.6)
HGB BLD-MCNC: 8.4 G/DL (ref 12–15.9)
IMM GRANULOCYTES # BLD AUTO: 0.02 10*3/MM3 (ref 0–0.05)
IMM GRANULOCYTES NFR BLD AUTO: 0.5 % (ref 0–0.5)
LYMPHOCYTES # BLD AUTO: 1.28 10*3/MM3 (ref 0.7–3.1)
LYMPHOCYTES NFR BLD AUTO: 31.4 % (ref 19.6–45.3)
MCH RBC QN AUTO: 26.3 PG (ref 26.6–33)
MCHC RBC AUTO-ENTMCNC: 30.3 G/DL (ref 31.5–35.7)
MCV RBC AUTO: 86.8 FL (ref 79–97)
MONOCYTES # BLD AUTO: 0.46 10*3/MM3 (ref 0.1–0.9)
MONOCYTES NFR BLD AUTO: 11.3 % (ref 5–12)
NEUTROPHILS NFR BLD AUTO: 2.09 10*3/MM3 (ref 1.7–7)
NEUTROPHILS NFR BLD AUTO: 51.4 % (ref 42.7–76)
NRBC BLD AUTO-RTO: 0 /100 WBC (ref 0–0.2)
PLATELET # BLD AUTO: 179 10*3/MM3 (ref 140–450)
PMV BLD AUTO: 9.5 FL (ref 6–12)
POTASSIUM SERPL-SCNC: 3.5 MMOL/L (ref 3.5–5.2)
PROT SERPL-MCNC: 6.9 G/DL (ref 6–8.5)
RBC # BLD AUTO: 3.19 10*6/MM3 (ref 3.77–5.28)
SODIUM SERPL-SCNC: 140 MMOL/L (ref 136–145)
WBC NRBC COR # BLD: 4.07 10*3/MM3 (ref 3.4–10.8)

## 2022-06-12 PROCEDURE — 25010000002 ENOXAPARIN PER 10 MG: Performed by: INTERNAL MEDICINE

## 2022-06-12 PROCEDURE — 25010000002 METHYLNALTREXONE 12 MG/0.6ML SOLUTION: Performed by: SURGERY

## 2022-06-12 PROCEDURE — 97110 THERAPEUTIC EXERCISES: CPT

## 2022-06-12 PROCEDURE — 92526 ORAL FUNCTION THERAPY: CPT | Performed by: SPEECH-LANGUAGE PATHOLOGIST

## 2022-06-12 PROCEDURE — 85025 COMPLETE CBC W/AUTO DIFF WBC: CPT | Performed by: INTERNAL MEDICINE

## 2022-06-12 PROCEDURE — 80053 COMPREHEN METABOLIC PANEL: CPT | Performed by: INTERNAL MEDICINE

## 2022-06-12 PROCEDURE — 25010000002 CEFAZOLIN PER 500 MG: Performed by: SURGERY

## 2022-06-12 RX ADMIN — HYDROCODONE BITARTRATE AND ACETAMINOPHEN 1 TABLET: 7.5; 325 TABLET ORAL at 22:41

## 2022-06-12 RX ADMIN — ENALAPRILAT 0.62 MG: 1.25 INJECTION INTRAVENOUS at 23:28

## 2022-06-12 RX ADMIN — DOCUSATE SODIUM 100 MG: 100 CAPSULE, LIQUID FILLED ORAL at 21:19

## 2022-06-12 RX ADMIN — CEFAZOLIN 2 G: 10 INJECTION, POWDER, FOR SOLUTION INTRAVENOUS at 05:36

## 2022-06-12 RX ADMIN — Medication 324 MG: at 09:36

## 2022-06-12 RX ADMIN — FUROSEMIDE 40 MG: 40 TABLET ORAL at 09:20

## 2022-06-12 RX ADMIN — VALACYCLOVIR HYDROCHLORIDE 500 MG: 500 TABLET, FILM COATED ORAL at 09:20

## 2022-06-12 RX ADMIN — DOCUSATE SODIUM 100 MG: 100 CAPSULE, LIQUID FILLED ORAL at 09:20

## 2022-06-12 RX ADMIN — ISOSORBIDE MONONITRATE 60 MG: 60 TABLET, EXTENDED RELEASE ORAL at 09:20

## 2022-06-12 RX ADMIN — Medication 1 TABLET: at 09:20

## 2022-06-12 RX ADMIN — POLYETHYLENE GLYCOL 3350 17 G: 17 POWDER, FOR SOLUTION ORAL at 09:19

## 2022-06-12 RX ADMIN — DOCUSATE SODIUM 50 MG AND SENNOSIDES 8.6 MG 2 TABLET: 8.6; 5 TABLET, FILM COATED ORAL at 09:20

## 2022-06-12 RX ADMIN — Medication 10 ML: at 09:21

## 2022-06-12 RX ADMIN — DOCUSATE SODIUM 50 MG AND SENNOSIDES 8.6 MG 2 TABLET: 8.6; 5 TABLET, FILM COATED ORAL at 21:19

## 2022-06-12 RX ADMIN — SUCRALFATE 1 G: 1 TABLET ORAL at 11:21

## 2022-06-12 RX ADMIN — CARVEDILOL 25 MG: 25 TABLET, FILM COATED ORAL at 18:14

## 2022-06-12 RX ADMIN — ENALAPRILAT 0.62 MG: 1.25 INJECTION INTRAVENOUS at 11:21

## 2022-06-12 RX ADMIN — ENALAPRILAT 0.62 MG: 1.25 INJECTION INTRAVENOUS at 04:49

## 2022-06-12 RX ADMIN — LABETALOL HYDROCHLORIDE 10 MG: 5 INJECTION INTRAVENOUS at 01:10

## 2022-06-12 RX ADMIN — CARVEDILOL 25 MG: 25 TABLET, FILM COATED ORAL at 09:20

## 2022-06-12 RX ADMIN — COLLAGENASE SANTYL 1 APPLICATION: 250 OINTMENT TOPICAL at 09:19

## 2022-06-12 RX ADMIN — ENALAPRILAT 0.62 MG: 1.25 INJECTION INTRAVENOUS at 18:14

## 2022-06-12 RX ADMIN — MAGNESIUM GLUCONATE 500 MG ORAL TABLET 400 MG: 500 TABLET ORAL at 09:20

## 2022-06-12 RX ADMIN — OXYCODONE HYDROCHLORIDE AND ACETAMINOPHEN 500 MG: 500 TABLET ORAL at 09:20

## 2022-06-12 RX ADMIN — DULOXETINE HYDROCHLORIDE 60 MG: 30 CAPSULE, DELAYED RELEASE ORAL at 09:20

## 2022-06-12 RX ADMIN — FLUTICASONE PROPIONATE 1 SPRAY: 50 SPRAY, METERED NASAL at 09:21

## 2022-06-12 RX ADMIN — METHYLNALTREXONE BROMIDE 12 MG: 12 INJECTION, SOLUTION SUBCUTANEOUS at 09:19

## 2022-06-12 RX ADMIN — ASPIRIN 81 MG: 81 TABLET, COATED ORAL at 09:20

## 2022-06-12 RX ADMIN — CEFAZOLIN 2 G: 10 INJECTION, POWDER, FOR SOLUTION INTRAVENOUS at 22:40

## 2022-06-12 RX ADMIN — LIDOCAINE 1 PATCH: 50 PATCH CUTANEOUS at 09:19

## 2022-06-12 RX ADMIN — SUCRALFATE 1 G: 1 TABLET ORAL at 18:14

## 2022-06-12 RX ADMIN — BISACODYL 10 MG: 10 SUPPOSITORY RECTAL at 09:20

## 2022-06-12 RX ADMIN — ENOXAPARIN SODIUM 40 MG: 100 INJECTION SUBCUTANEOUS at 11:21

## 2022-06-12 RX ADMIN — POTASSIUM CHLORIDE, DEXTROSE MONOHYDRATE AND SODIUM CHLORIDE 75 ML/HR: 150; 5; 900 INJECTION, SOLUTION INTRAVENOUS at 13:18

## 2022-06-12 RX ADMIN — CEFAZOLIN 2 G: 10 INJECTION, POWDER, FOR SOLUTION INTRAVENOUS at 14:38

## 2022-06-12 RX ADMIN — Medication 250 MG: at 09:20

## 2022-06-12 RX ADMIN — FAMOTIDINE 40 MG: 20 TABLET, FILM COATED ORAL at 09:20

## 2022-06-12 RX ADMIN — THIAMINE HCL TAB 100 MG 100 MG: 100 TAB at 09:20

## 2022-06-12 RX ADMIN — SUCRALFATE 1 G: 1 TABLET ORAL at 21:19

## 2022-06-12 RX ADMIN — ENOXAPARIN SODIUM 40 MG: 100 INJECTION SUBCUTANEOUS at 23:43

## 2022-06-12 NOTE — PLAN OF CARE
Goal Outcome Evaluation:  Plan of Care Reviewed With: patient, caregiver (SARAH Kilpatrick)        Progress: improving       PTA called SLP stating patient was alert and requesting something to drink. SLP treatment session completed. Patient initially with eyes closed but woke with verbal greeting. The patient is much more conversational today. She asks where she is, why, and who brought her. She reports not remembering any of these details. She does state she is thirsty but not hungry. She completed several trials of thin water and regular solids. No overt s/s of aspiration observed. Functional rotary chew given regular solids.     Patient OK to begin regular diet with thin liquids. She may require at least setup assistance with meals. Medications should be given whole as tolerated. Oral care and standard swallow precautions.     SLP will follow up at least 1x to ensure continued diet toleration. If decrease in cognitive status or alertness, SLP recommends holding PO until independently accepting intake.

## 2022-06-12 NOTE — THERAPY TREATMENT NOTE
Acute Care - Speech Language Pathology   Swallow Treatment Note [facility]     Patient Name: Tawny Shin  : 1955  MRN: [id number]  Today's Date: 2022               Admit Date: 6/3/2022  PTA called SLP stating patient was alert and requesting something to drink. SLP treatment session completed. Patient initially with eyes closed but woke with verbal greeting. The patient is much more conversational today. She asks where she is, why, and who brought her. She reports not remembering any of these details. She does state she is thirsty but not hungry. She completed several trials of thin water and regular solids. No overt s/s of aspiration observed. Functional rotary chew given regular solids.     Patient OK to begin regular diet with thin liquids. She may require at least setup assistance with meals. Medications should be given whole as tolerated. Oral care and standard swallow precautions.     SLP will follow up at least 1x to ensure continued diet toleration. If decrease in cognitive status or alertness, SLP recommends holding PO until independently accepting intake.   Nicole Acuña MS CCC-SLP 2022 07:44 CDT    Visit Dx:     ICD-10-CM ICD-9-CM   1. Abscess of right hip  L02.415 682.6   2. Impaired mobility  Z74.09 799.89   3. Abscess of hip, right  L02.415 682.6   4. Other dysphagia  R13.19 787.29     Patient Active Problem List   Diagnosis   • T12 compression fracture, initial encounter (HCC)   • Chronic embolism and thrombosis of unspecified deep veins of left lower extremity (HCC)   • Urinary tract infection without hematuria   • Acute bilateral thoracic back pain   • Chronic anticoagulation   • Hypokalemia   • Transaminitis   • Iron deficiency anemia   • Osteoporosis   • Bacteremia   • Epidural hematoma ([facility])   • Pleural effusion, left   • Functional neurological symptom disorder with weakness or paralysis   • Overweight (BMI 25.0-29.9)   • Rheumatoid arthritis involving multiple sites  ([facility])   • (HFpEF) heart failure with preserved ejection fraction ([facility]), acute on chronic   • Venous insufficiency   • Coronary artery disease involving native coronary artery of native heart without angina pectoris   • Sick sinus syndrome ([facility])   • Essential hypertension   • Pressure injury of skin of heel   • Abscess, right hip   • Chronic pain   • Chronic indwelling Horan catheter   • Abscess of right hip   • Anemia of chronic disease   • Cholelithiasis   • Encephalopathy, toxic     Past Medical History:   Diagnosis Date   • Age-related osteoporosis with current pathological fracture 5/27/2020   • Arthritis    • Asthma    • Bilateral bunions 12/23/2020   • Cancer ([facility])    • Cardiac pacemaker syndrome 12/23/2020    Overview:  - heart block - implanted 11/16   • Charcot's joint of foot, left 12/23/2020   • Chronic deep vein thrombosis (DVT) of right lower extremity ([facility]) 6/23/2021   • Chronic pain syndrome 6/22/2021   • Chronic sinusitis    • COPD (chronic obstructive pulmonary disease) ([facility])    • Coronary artery disease    • 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   • Elevated cholesterol    • Eustachian tube dysfunction    • Heart disease    • Herpes simplex    • History of transfusion    • Hyperlipidemia    • Hypertension    • Hypothyroidism 12/23/2020   • Intrinsic asthma 12/23/2020   • Knee dislocation    • Labral tear of right hip joint    • Laryngitis sicca    • Laryngitis, chronic    • Left carotid bruit 3/9/2016   • MI (myocardial infarction) ([facility])    • Myalgia due to statin 6/25/2019   • Open wound of right hip 9/14/2021   • Osteomyelitis of right femur ([facility]) 7/6/2021   • Otorrhea    • Pacemaker 11/17/2016   • Primary osteoarthritis of left knee 12/23/2020   • Psoriasis vulgaris 12/23/2020   • S/P coronary artery stent placement 3/9/2016   • Sensorineural hearing loss    • Seropositive rheumatoid arthritis of multiple sites ([facility]) 12/27/2019    Overview:  -myochrysine '93-'96 -methotrexate  "'96--->11/98;r/s  restarted 2/99--> 8/14 (anemia) -sulfasalazine- not effective -penicillamine 6/98-->10/98; no effect -leflunomide 11/98--> - Humira '13-->didn't take - Enbrel 12/14-->3/15- no effect!   Last Assessment & Plan:  - \"aching all over\" because she had to be off her anti-rheumatic drugs for 2 weeks in preparation for her R knee surgery - he   • Sick sinus syndrome (HCC) 12/27/2019   • Sjogren's disease (HCC)    • Spondylolisthesis of lumbar region 1/17/2018   • Syncope, recurrent 2/8/2021     Past Surgical History:   Procedure Laterality Date   • A-V CARDIAC PACEMAKER INSERTION  2016   • ATRIAL CARDIAC PACEMAKER INSERTION     • CARDIAC CATHETERIZATION     • CATARACT EXTRACTION     • CERVICAL CORPECTOMY N/A 3/3/2021    Procedure: CERVICAL 6 CORPECTOMY WITH TITANIUM CAGE WITH NEURO MONITORING;  Surgeon: Bandar Shea MD;  Location:  [facility] OR;  Service: Neurosurgery;  Laterality: N/A;   • COLONOSCOPY  11/08/2011    One fold in the ascending colon which showed ulcer otherwise normal exam   • COLONOSCOPY  11/12/2004    Normal exam repeat in five years   • CORONARY ANGIOPLASTY WITH STENT PLACEMENT      X 2; 2013 & 2014   • ENDOSCOPY  07/10/2014    Normal exam   • FLAP LEG Right 9/14/2021    Procedure: RIGHT GLUTEAL FASCIOCUTANEOUS ADVANCEMENT FLAP AND RIGHT TENSOR FASCIAL JESSICA FLAP;  Surgeon: Amadeo Turenr MD;  Location: [facility] OR;  Service: Plastics;  Laterality: Right;   • HIP ABDUCTION TENOTOMY BILATERAL Right 1/14/2021    Procedure: RIGHT HIP GLUTEUS MEDLUS / MINIMUS REPAIR, POSSIBLE ACHILLES ALLOGRAFT;  Surgeon: Nino Carlson MD;  Location:  [facility] OR;  Service: Orthopedics;  Laterality: Right;   • INCISION AND DRAINAGE ABSCESS Right 6/4/2022    Procedure: INCISION AND DRAINAGE ABSCESS right hip;  Surgeon: Magda Salcido MD;  Location:  [facility] OR;  Service: General;  Laterality: Right;   • INCISION AND DRAINAGE ABSCESS Right 6/10/2022    Procedure: RIGHT HIP INCISION AND DRAINAGE. MD NEEDS " 3L VANC IRRIGATION, CURRETTES, DAICANS, KERLEX ROLLS;  Surgeon: Amadeo Turner MD;  Location:  [facility] OR;  Service: Plastics;  Laterality: Right;   • INCISION AND DRAINAGE HIP Right 2/9/2021    Procedure: HIP INCISION AND DRAINAGE;  Surgeon: Nino Carlson MD;  Location:  [facility] OR;  Service: Orthopedics;  Laterality: Right;   • INCISION AND DRAINAGE LEG Right 10/24/2021    Procedure: INCISION AND DRAINAGE LOWER EXTREMITY;  Surgeon: Amadeo Turner MD;  Location:  [facility] OR;  Service: Plastics;  Laterality: Right;   • INCISION AND DRAINAGE OF WOUND Right 7/8/2021    Procedure: INCISION AND DRAINAGE WOUND RIGHT HIP;  Surgeon: James Huntley MD;  Location:  [facility] OR;  Service: Orthopedics;  Laterality: Right;   • JOINT REPLACEMENT     • KYPHOPLASTY WITH BIOPSY Bilateral 10/26/2021    Procedure: THOARCIC 12 KYPHOPLASTY WITH BIOPSY;  Surgeon: Bandar Shea MD;  Location:  [facility] OR;  Service: Neurosurgery;  Laterality: Bilateral;   • LEG DEBRIDEMENT Right 9/14/2021    Procedure: DEBRIDEMENT OF RIGHT HIP WOUND, RIGHT GLUTEAL FASCIOCUTANEOUS ADVANCEMENT FLAP AND RIGHT TENSOR FASCIAL JESSICA FLAP;  Surgeon: Amadeo Turner MD;  Location: [facility] OR;  Service: Plastics;  Laterality: Right;   • LUMBAR DISCECTOMY Right 3/23/2021    Procedure: LUMBAR DISCECTOMY MICRO, Lumbar 1/2 right;  Surgeon: Bandar Shea MD;  Location:  [facility] OR;  Service: Neurosurgery;  Laterality: Right;   • LUMBAR FUSION N/A 1/19/2018    Procedure: L3-4,L4-5 DECOMPRESSION, POSTERIOR SPINAL FUSION WITH INSTRUMENTATION;  Surgeon: Fortino Oropeza MD;  Location: [facility] OR;  Service:    • LUMBAR LAMINECTOMY WITH FUSION Left 1/17/2018    Procedure: LEFT L3-4 L4-5 LATERAL LUMBAR INTERBODY FUSION;  Surgeon: Fortino Oropeza MD;  Location:  [facility] OR;  Service:    • MYRINGOTOMY W/ TUBES  09/04/2014    TUBES NO LONGER IN PLACE   • OTHER SURGICAL HISTORY      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 so hardware was removed and spacers were  placed   • REPLACEMENT TOTAL KNEE Right        SLP Recommendation and Plan  SLP Swallowing Diagnosis: mod-severe, oral dysphagia, R/O pharyngeal dysphagia (06/11/22 1200)  SLP Diet Recommendation: regular textures, thin liquids (06/12/22 0721)  Recommended Precautions and Strategies: upright posture during/after eating, small bites of food and sips of liquid, general aspiration precautions, fatigue precautions (06/12/22 0721)  SLP Rec. for Method of Medication Administration: meds whole, as tolerated (06/12/22 0721)     Monitor for Signs of Aspiration: yes, notify SLP if any concerns (06/12/22 0721)  Recommended Diagnostics: reassess via clinical swallow evaluation (06/11/22 1200)  Swallow Criteria for Skilled Therapeutic Interventions Met: demonstrates skilled criteria (06/11/22 1200)  Anticipated Discharge Disposition (SLP): unknown (06/11/22 1200)  Rehab Potential/Prognosis, Swallowing: adequate, monitor progress closely (06/11/22 1200)  Therapy Frequency (Swallow): at least, 3 days per week (06/11/22 1200)  Predicted Duration Therapy Intervention (Days): until discharge (06/11/22 1200)     Daily Summary of Progress (SLP): progress toward functional goals as expected (06/12/22 0721)               Treatment Assessment (SLP): Cognition improved. Restart diet. (06/12/22 0721)  Plan for Continued Treatment (SLP): goals adjusted to reflect functional improvements demonstrated (06/12/22 0721)         Plan of Care Reviewed With: patient, caregiver (SARAH Kilpatrick)  Progress: improving      SWALLOW EVALUATION (last 72 hours)     SLP Adult Swallow Evaluation     Row Name 06/12/22 0721 06/11/22 1200                Rehab Evaluation    Document Type therapy note (daily note)  -MM evaluation  -MM       Subjective Information no complaints  -MM no complaints  -MM       Patient Observations alert;cooperative;agree to therapy  -MM alert;poorly cooperative;agree to therapy  -MM       Patient/Family/Caregiver Comments/Observations No  family present.  -MM 2 sisters present.  -MM       Patient Effort good  -MM good  -MM       Symptoms Noted During/After Treatment none  -MM none  -MM                General Information    Patient Profile Reviewed -- yes  -MM       Pertinent History Of Current Problem -- Primary problem: R hip surgical site drainage s/p I&D.  -MM       Current Method of Nutrition -- NPO  -MM       Precautions/Limitations, Vision -- WFL;for purposes of eval  -MM       Precautions/Limitations, Hearing -- hearing impairment, bilaterally  sensorineural  -MM       Prior Level of Function-Communication -- WFL  -MM       Prior Level of Function-Swallowing -- no diet consistency restrictions  -MM       Plans/Goals Discussed with -- patient and family;other (see comments);agreed upon  SARAH Kilpatrick  -MM       Barriers to Rehab -- cognitive status  -MM       Patient's Goals for Discharge -- patient could not state  -MM       Family Goals for Discharge -- patient able to return to PO diet;patient able to return to all previous activities/roles  -MM                Pain    Additional Documentation Pain Scale: Numbers Pre/Post-Treatment (Group)  -MM Pain Scale: FACES Pre/Post-Treatment (Group)  -MM                Pain Scale: Numbers Pre/Post-Treatment    Pretreatment Pain Rating 0/10 - no pain  -MM --       Posttreatment Pain Rating 0/10 - no pain  -MM --                Pain Scale: FACES Pre/Post-Treatment    Pain: FACES Scale, Pretreatment -- 0-->no hurt  -MM       Posttreatment Pain Rating -- 0-->no hurt  -MM                Oral Motor Structure and Function    Dentition Assessment -- natural, present and adequate  -MM       Secretion Management -- dried secretions in oral cavity  -MM       Mucosal Quality -- sticky  -MM       Volitional Swallow -- unable to elicit  -MM       Volitional Cough -- unable to elicit  -MM                Oral Musculature and Cranial Nerve Assessment    Oral Motor General Assessment -- generalized oral motor weakness  -MM                 General Eating/Swallowing Observations    Eating/Swallowing Skills -- fed by SLP  -MM       Positioning During Eating -- upright in bed  -MM       Utensils Used -- spoon;straw  -MM       Consistencies Trialed -- pureed;thin liquids  -MM                Clinical Swallow Eval    Oral Prep Phase -- impaired  -MM       Oral Transit -- impaired  -MM       Oral Residue -- impaired  -MM       Pharyngeal Phase -- no overt signs/symptoms of pharyngeal impairment  -MM       Esophageal Phase -- unremarkable  -MM       Clinical Swallow Evaluation Summary -- See note  -MM                Oral Prep Concerns    Oral Prep Concerns -- oral holding  -MM       Oral Holding -- pudding;thin  -MM                Oral Transit Concerns    Oral Transit Concerns -- unable to initiate oral transit  -MM                Oral Residue Concerns    Oral Residue Concerns -- diffuse residue throughout oral cavity  -MM       Diffuse Residue Throughout Oral Cavity -- pudding  -MM                SLP Evaluation Clinical Impression    SLP Swallowing Diagnosis -- mod-severe;oral dysphagia;R/O pharyngeal dysphagia  -MM       Functional Impact -- risk of aspiration/pneumonia;risk of malnutrition;risk of dehydration  -MM       Rehab Potential/Prognosis, Swallowing -- adequate, monitor progress closely  -MM       Swallow Criteria for Skilled Therapeutic Interventions Met -- demonstrates skilled criteria  -MM                SLP Treatment Clinical Impressions    Treatment Assessment (SLP) Cognition improved. Restart diet.  -MM --       Daily Summary of Progress (SLP) progress toward functional goals as expected  -MM --       Plan for Continued Treatment (SLP) goals adjusted to reflect functional improvements demonstrated  -MM --       Care Plan Review care plan/treatment goals reviewed;risks/benefits reviewed;evaluation/treatment results reviewed;current/potential barriers reviewed;patient/other agree to care plan  -MM --       Care Plan Review, Other  Participant(s) caregiver  SARAH Kilpatrick  -MM --                Recommendations    Therapy Frequency (Swallow) -- at least;3 days per week  -MM       Predicted Duration Therapy Intervention (Days) -- until discharge  -MM       SLP Diet Recommendation regular textures;thin liquids  -MM NPO  -MM       Recommended Diagnostics -- reassess via clinical swallow evaluation  -MM       Recommended Precautions and Strategies upright posture during/after eating;small bites of food and sips of liquid;general aspiration precautions;fatigue precautions  -MM --       Oral Care Recommendations Oral Care BID/PRN;Toothbrush  -MM Oral Care BID/PRN;Suction toothbrush  -MM       SLP Rec. for Method of Medication Administration meds whole;as tolerated  -MM meds via alternate route  -MM       Monitor for Signs of Aspiration yes;notify SLP if any concerns  -MM yes;notify SLP if any concerns  -MM       Anticipated Discharge Disposition (SLP) -- unknown  -MM                Swallow Goals (SLP)    Oral Nutrition/Hydration Goal Selection (SLP) oral nutrition/hydration, SLP goal 1  -MM oral nutrition/hydration, SLP goal 1  -MM                Oral Nutrition/Hydration Goal 1 (SLP)    Oral Nutrition/Hydration Goal 1, SLP LTG: Patient will tolerate LRD without s/s of aspiration.  -MM LTG: Patient will tolerate LRD without s/s of aspiration.  -MM       Time Frame (Oral Nutrition/Hydration Goal 1, SLP) by discharge  -MM by discharge  -MM       Barriers (Oral Nutrition/Hydration Goal 1, SLP) cognition  -MM cognition  -MM       Progress/Outcomes (Oral Nutrition/Hydration Goal 1, SLP) continuing progress toward goal  -MM goal ongoing  -MM             User Key  (r) = Recorded By, (t) = Taken By, (c) = Cosigned By    Initials Name Effective Dates    MM Nicole Acuña MS CCC-SLP 06/16/21 -                 EDUCATION  The patient has been educated in the following areas:   Dysphagia (Swallowing Impairment) Oral Care/Hydration.        SLP GOALS     Row Name  06/12/22 0721 06/11/22 1200          Oral Nutrition/Hydration Goal 1 (SLP)    Oral Nutrition/Hydration Goal 1, SLP LTG: Patient will tolerate LRD without s/s of aspiration.  -MM LTG: Patient will tolerate LRD without s/s of aspiration.  -MM     Time Frame (Oral Nutrition/Hydration Goal 1, SLP) by discharge  -MM by discharge  -MM     Barriers (Oral Nutrition/Hydration Goal 1, SLP) cognition  -MM cognition  -MM     Progress/Outcomes (Oral Nutrition/Hydration Goal 1, SLP) continuing progress toward goal  -MM goal ongoing  -MM           User Key  (r) = Recorded By, (t) = Taken By, (c) = Cosigned By    Initials Name Provider Type    Nicole Tamayo MS CCC-SLP Speech and Language Pathologist                   Time Calculation:    Time Calculation- SLP     Row Name 06/12/22 0744             Time Calculation- SLP    SLP Start Time 0721  -MM      SLP Stop Time 0744  -MM      SLP Time Calculation (min) 23 min  -MM      SLP Received On 06/12/22  -MM              Untimed Charges    [id number] Treatment Swallow Minutes 23  -MM              Total Minutes    Untimed Charges Total Minutes 23  -MM       Total Minutes 23  -MM            User Key  (r) = Recorded By, (t) = Taken By, (c) = Cosigned By    Initials Name Provider Type    Nicole Tamayo MS CCC-SLP Speech and Language Pathologist                Therapy Charges for Today     Code Description Service Date Service Provider Modifiers Qty    88026931498 HC ST EVAL ORAL PHARYNG SWALLOW 4 6/11/2022 Nicole Acuña MS CCC-SLP GN 1    41702189177 HC ST TREATMENT SWALLOW 2 6/12/2022 Nicole Acuña MS CCC-GT GN 1               MS KEVAN Valdez  6/12/2022

## 2022-06-12 NOTE — PLAN OF CARE
Goal Outcome Evaluation:  Plan of Care Reviewed With: patient        Progress: improving  Patient is less confused than last night and speaking more. NC 2L O2. BP elevated and PRN medication given. Dressing changed and CDI. Patient being turned throughout the night. Call bell in reach and safety measures maintained.

## 2022-06-12 NOTE — THERAPY TREATMENT NOTE
Acute Care - Physical Therapy Treatment Note  [facility]     Patient Name: Tawny Shin  : 1955  MRN: [id number]  Today's Date: 2022      Visit Dx:     ICD-10-CM ICD-9-CM   1. Abscess of right hip  L02.415 682.6   2. Impaired mobility  Z74.09 799.89   3. Abscess of hip, right  L02.415 682.6   4. Other dysphagia  R13.19 787.29     Patient Active Problem List   Diagnosis   • T12 compression fracture, initial encounter ([facility])   • Chronic embolism and thrombosis of unspecified deep veins of left lower extremity (HCC)   • Urinary tract infection without hematuria   • Acute bilateral thoracic back pain   • Chronic anticoagulation   • Hypokalemia   • Transaminitis   • Iron deficiency anemia   • Osteoporosis   • Bacteremia   • Epidural hematoma ([facility])   • Pleural effusion, left   • Functional neurological symptom disorder with weakness or paralysis   • Overweight (BMI 25.0-29.9)   • Rheumatoid arthritis involving multiple sites ([facility])   • (HFpEF) heart failure with preserved ejection fraction ([facility]), acute on chronic   • Venous insufficiency   • Coronary artery disease involving native coronary artery of native heart without angina pectoris   • Sick sinus syndrome ([facility])   • Essential hypertension   • Pressure injury of skin of heel   • Abscess, right hip   • Chronic pain   • Chronic indwelling Horan catheter   • Abscess of right hip   • Anemia of chronic disease   • Cholelithiasis   • Encephalopathy, toxic     Past Medical History:   Diagnosis Date   • Age-related osteoporosis with current pathological fracture 2020   • Arthritis    • Asthma    • Bilateral bunions 2020   • Cancer ([facility])    • Cardiac pacemaker syndrome 2020    Overview:  - heart block - implanted    • Charcot's joint of foot, left 2020   • Chronic deep vein thrombosis (DVT) of right lower extremity (HCC) 2021   • Chronic pain syndrome 2021   • Chronic sinusitis    • COPD (chronic obstructive pulmonary  "disease) ([facility])    • Coronary artery disease    • 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   • Elevated cholesterol    • Eustachian tube dysfunction    • Heart disease    • Herpes simplex    • History of transfusion    • Hyperlipidemia    • Hypertension    • Hypothyroidism 12/23/2020   • Intrinsic asthma 12/23/2020   • Knee dislocation    • Labral tear of right hip joint    • Laryngitis sicca    • Laryngitis, chronic    • Left carotid bruit 3/9/2016   • MI (myocardial infarction) ([facility])    • Myalgia due to statin 6/25/2019   • Open wound of right hip 9/14/2021   • Osteomyelitis of right femur ([facility]) 7/6/2021   • Otorrhea    • Pacemaker 11/17/2016   • Primary osteoarthritis of left knee 12/23/2020   • Psoriasis vulgaris 12/23/2020   • S/P coronary artery stent placement 3/9/2016   • Sensorineural hearing loss    • Seropositive rheumatoid arthritis of multiple sites ([facility]) 12/27/2019    Overview:  -myochrysine '93-'96 -methotrexate '96--->11/98;r/s  restarted 2/99--> 8/14 (anemia) -sulfasalazine- not effective -penicillamine 6/98-->10/98; no effect -leflunomide 11/98--> - Humira '13-->didn't take - Enbrel 12/14-->3/15- no effect!   Last Assessment & Plan:  - \"aching all over\" because she had to be off her anti-rheumatic drugs for 2 weeks in preparation for her R knee surgery - he   • Sick sinus syndrome ([facility]) 12/27/2019   • Sjogren's disease ([facility])    • Spondylolisthesis of lumbar region 1/17/2018   • Syncope, recurrent 2/8/2021     Past Surgical History:   Procedure Laterality Date   • A-V CARDIAC PACEMAKER INSERTION  2016   • ATRIAL CARDIAC PACEMAKER INSERTION     • CARDIAC CATHETERIZATION     • CATARACT EXTRACTION     • CERVICAL CORPECTOMY N/A 3/3/2021    Procedure: CERVICAL 6 CORPECTOMY WITH TITANIUM CAGE WITH NEURO MONITORING;  Surgeon: Bandar Shea MD;  Location: [facility];  Service: Neurosurgery;  Laterality: N/A;   • COLONOSCOPY  11/08/2011    One fold in the ascending colon which showed ulcer " otherwise normal exam   • COLONOSCOPY  11/12/2004    Normal exam repeat in five years   • CORONARY ANGIOPLASTY WITH STENT PLACEMENT      X 2; 2013 & 2014   • ENDOSCOPY  07/10/2014    Normal exam   • FLAP LEG Right 9/14/2021    Procedure: RIGHT GLUTEAL FASCIOCUTANEOUS ADVANCEMENT FLAP AND RIGHT TENSOR FASCIAL JESSICA FLAP;  Surgeon: Amadeo Turner MD;  Location:  [facility];  Service: Plastics;  Laterality: Right;   • HIP ABDUCTION TENOTOMY BILATERAL Right 1/14/2021    Procedure: RIGHT HIP GLUTEUS MEDLUS / MINIMUS REPAIR, POSSIBLE ACHILLES ALLOGRAFT;  Surgeon: Nino Carlson MD;  Location:  [facility];  Service: Orthopedics;  Laterality: Right;   • INCISION AND DRAINAGE ABSCESS Right 6/4/2022    Procedure: INCISION AND DRAINAGE ABSCESS right hip;  Surgeon: Magda Salcido MD;  Location:  [facility];  Service: General;  Laterality: Right;   • INCISION AND DRAINAGE ABSCESS Right 6/10/2022    Procedure: RIGHT HIP INCISION AND DRAINAGE. MD NEEDS 3L VANC IRRIGATION, CURRETTES, DAICANS, KERLEX ROLLS;  Surgeon: Amadeo Turner MD;  Location:  [facility];  Service: Plastics;  Laterality: Right;   • INCISION AND DRAINAGE HIP Right 2/9/2021    Procedure: HIP INCISION AND DRAINAGE;  Surgeon: Nino Carlson MD;  Location:  [facility];  Service: Orthopedics;  Laterality: Right;   • INCISION AND DRAINAGE LEG Right 10/24/2021    Procedure: INCISION AND DRAINAGE LOWER EXTREMITY;  Surgeon: Amadeo Turner MD;  Location:  [facility];  Service: Plastics;  Laterality: Right;   • INCISION AND DRAINAGE OF WOUND Right 7/8/2021    Procedure: INCISION AND DRAINAGE WOUND RIGHT HIP;  Surgeon: James Huntley MD;  Location:  [facility];  Service: Orthopedics;  Laterality: Right;   • JOINT REPLACEMENT     • KYPHOPLASTY WITH BIOPSY Bilateral 10/26/2021    Procedure: THOARCIC 12 KYPHOPLASTY WITH BIOPSY;  Surgeon: Bandar Shea MD;  Location:  [facility];  Service: Neurosurgery;  Laterality: Bilateral;   • LEG DEBRIDEMENT Right 9/14/2021     Procedure: DEBRIDEMENT OF RIGHT HIP WOUND, RIGHT GLUTEAL FASCIOCUTANEOUS ADVANCEMENT FLAP AND RIGHT TENSOR FASCIAL JESSICA FLAP;  Surgeon: Amadeo Turner MD;  Location:  [facility];  Service: Plastics;  Laterality: Right;   • LUMBAR DISCECTOMY Right 3/23/2021    Procedure: LUMBAR DISCECTOMY MICRO, Lumbar 1/2 right;  Surgeon: Bandar Shea MD;  Location:  [facility];  Service: Neurosurgery;  Laterality: Right;   • LUMBAR FUSION N/A 1/19/2018    Procedure: L3-4,L4-5 DECOMPRESSION, POSTERIOR SPINAL FUSION WITH INSTRUMENTATION;  Surgeon: Fortino Oropeza MD;  Location:  [facility];  Service:    • LUMBAR LAMINECTOMY WITH FUSION Left 1/17/2018    Procedure: LEFT L3-4 L4-5 LATERAL LUMBAR INTERBODY FUSION;  Surgeon: Fortino Oropeza MD;  Location:  [facility];  Service:    • MYRINGOTOMY W/ TUBES  09/04/2014    TUBES NO LONGER IN PLACE   • OTHER SURGICAL HISTORY      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 so hardware was removed and spacers were placed   • REPLACEMENT TOTAL KNEE Right      PT Assessment (last 12 hours)     PT Evaluation and Treatment     Row Name 06/12/22 0749          Physical Therapy Time and Intention    Subjective Information complains of;pain  -AB     Document Type therapy note (daily note)  -AB     Mode of Treatment physical therapy  -AB     Row Name 06/12/22 0749          Pain    Pretreatment Pain Rating 0/10 - no pain  -AB     Posttreatment Pain Rating 6/10  -AB     Pain Location - Side/Orientation Right  -AB     Pain Location - hip  -AB     Row Name 06/12/22 0749          Motor Skills    Comments, Therapeutic Exercise R LE AAROM and L LE AROM with vc's to stay on task 20 reps supine exercises.  -AB     Row Name             Wound 06/04/22 0211 Right anterior greater trochanter    Wound - Properties Group Placement Date: 06/04/22  -LV Placement Time: 0211  -LV Present on Hospital Admission: Y  -LV Side: Right  -LV Orientation: anterior  -LV Location: greater trochanter  -LV     Retired Wound -  Properties Group Placement Date: 06/04/22  -LV Placement Time: 0211 -LV Present on Hospital Admission: Y  -LV Side: Right  -LV Orientation: anterior  -LV Location: greater trochanter  -LV     Retired Wound - Properties Group Date first assessed: 06/04/22  -LV Time first assessed: 0211 -LV Present on Hospital Admission: Y  -LV Side: Right  -LV Location: greater trochanter  -LV     Row Name             Wound 04/22/22 1858 Left posterior heel Pressure Injury    Wound - Properties Group Placement Date: 04/22/22  -BS Placement Time: 1858 -BS Side: Left  -MT Orientation: posterior  -MT Location: heel  -MT Primary Wound Type: Pressure inj  -MT Removal Date: --  -BS Removal Time: --  -BS Wound Outcome: Healed  -MT     Retired Wound - Properties Group Placement Date: 04/22/22  -BS Placement Time: 1858 -BS Side: Left  -MT Orientation: posterior  -MT Location: heel  -MT Primary Wound Type: Pressure inj  -MT Removal Date: --  -BS Removal Time: --  -BS Wound Outcome: Healed  -MT     Retired Wound - Properties Group Date first assessed: 04/22/22  -BS Time first assessed: 1858 -BS Side: Left  -MT Location: heel  -MT Primary Wound Type: Pressure inj  -MT Resolution Date: --  -BS Resolution Time: --  -BS Wound Outcome: Healed  -MT     Row Name             Wound 04/22/22 1858 Right lateral leg Pressure Injury    Wound - Properties Group Placement Date: 04/22/22  -BS Placement Time: 1858 -BS Present on Hospital Admission: Y  -MT Side: Right  -MT Orientation: lateral  -MT Location: leg  -MT Primary Wound Type: Pressure inj  -MT Removal Date: --  -BS Removal Time: --  -BS     Retired Wound - Properties Group Placement Date: 04/22/22  -BS Placement Time: 1858 -BS Present on Hospital Admission: Y  -MT Side: Right  -MT Orientation: lateral  -MT Location: leg  -MT Primary Wound Type: Pressure inj  -MT Removal Date: --  -BS Removal Time: --  -BS     Retired Wound - Properties Group Date first assessed: 04/22/22  -BS Time first  assessed: 1858  -BS Present on Hospital Admission: Y  -MT Side: Right  -MT Location: leg  -MT Primary Wound Type: Pressure inj  -MT Resolution Date: --  -BS Resolution Time: --  -BS     Row Name             Wound 04/22/22 1726 Left anterior greater trochanter    Wound - Properties Group Placement Date: 04/22/22  -MT Placement Time: 1726  -MT Present on Hospital Admission: Y  -MT Side: Left  -MT Orientation: anterior  -MT Location: greater trochanter  -MT     Retired Wound - Properties Group Placement Date: 04/22/22  -MT Placement Time: 1726  -MT Present on Hospital Admission: Y  -MT Side: Left  -MT Orientation: anterior  -MT Location: greater trochanter  -MT     Retired Wound - Properties Group Date first assessed: 04/22/22  -MT Time first assessed: 1726  -MT Present on Hospital Admission: Y  -MT Side: Left  -MT Location: greater trochanter  -MT     Row Name             Wound 06/04/22 0827 Right anterior hip Incision    Wound - Properties Group Placement Date: 06/04/22  -HW Placement Time: 0827  -HW Side: Right  -HW Orientation: anterior  -HW Location: hip  -HW Primary Wound Type: Incision  -HW     Retired Wound - Properties Group Placement Date: 06/04/22  -HW Placement Time: 0827  -HW Side: Right  -HW Orientation: anterior  -HW Location: hip  -HW Primary Wound Type: Incision  -HW     Retired Wound - Properties Group Date first assessed: 06/04/22  -HW Time first assessed: 0827  -HW Side: Right  -HW Location: hip  -HW Primary Wound Type: Incision  -HW     Row Name             Wound 06/07/22 0414 Right gluteal    Wound - Properties Group Placement Date: 06/07/22  -LV Placement Time: 0414  -LV Side: Right  -LV Location: gluteal  -LV     Retired Wound - Properties Group Placement Date: 06/07/22  -LV Placement Time: 0414  -LV Side: Right  -LV Location: gluteal  -LV     Retired Wound - Properties Group Date first assessed: 06/07/22  -LV Time first assessed: 0414  -LV Side: Right  -LV Location: gluteal  -LV     Row Name              Wound 06/10/22 1530 Right hip Incision    Wound - Properties Group Placement Date: 06/10/22  -TANIKA Placement Time: 1530  -TANIKA Present on Hospital Admission: Y  -TANIKA Side: Right  -TANIKA Location: hip  -TANIKA Primary Wound Type: Incision  -TANIKA     Retired Wound - Properties Group Placement Date: 06/10/22  -TANIKA Placement Time: 1530  -TANIKA Present on Hospital Admission: Y  -TANIKA Side: Right  -TANIKA Location: hip  -TANIKA Primary Wound Type: Incision  -TANIKA     Retired Wound - Properties Group Date first assessed: 06/10/22  -TANIKA Time first assessed: 1530  -TANIKA Present on Hospital Admission: Y  -TANIKA Side: Right  -TANIKA Location: hip  -TANIKA Primary Wound Type: Incision  -TANIKA     Row Name             NPWT (Negative Pressure Wound Therapy) 06/06/22 1000 R hip    NPWT (Negative Pressure Wound Therapy) - Properties Group Placement Date: 06/06/22  -MS Placement Time: 1000  -MS Location: R hip  -MS     Retired NPWT (Negative Pressure Wound Therapy) - Properties Group Placement Date: 06/06/22  -MS Placement Time: 1000  -MS Location: R hip  -MS     Retired NPWT (Negative Pressure Wound Therapy) - Properties Group Placement Date: 06/06/22  -MS Placement Time: 1000  -MS Location: R hip  -MS     Row Name 06/12/22 0749          Plan of Care Review    Plan of Care Reviewed With patient  -AB     Progress improving  -AB     Outcome Evaluation PT treatment complete, pt is more awake and talking this morning. She performed 20 reps Supine exercises with AAROM on R LE and AROM on L LE. She was left in fowlers with call light. PT will cont to follow.  -AB           User Key  (r) = Recorded By, (t) = Taken By, (c) = Cosigned By    Initials Name Provider Type    AB Kasie Perkins, PTA Physical Therapist Assistant    MS Nicole Olson R, PT, DPT, NCS Physical Therapist    Ranjana Sin RN Registered Nurse    Ewelina Eubanks RN Registered Nurse    Leeann Younger, RN Registered Nurse    Benjie Melendez, RN Registered Nurse    RADHA Renner,  Consuelo RN Registered Nurse                Physical Therapy Education                 Title: PT OT SLP Therapies (In Progress)     Topic: Physical Therapy (In Progress)     Point: Mobility training (In Progress)     Learning Progress Summary           Patient Nonacceptance, E, NR by AB at 6/11/2022 0842                   Point: Home exercise program (In Progress)     Learning Progress Summary           Patient Nonacceptance, E, NR by AB at 6/11/2022 0842                   Point: Body mechanics (In Progress)     Learning Progress Summary           Patient Nonacceptance, E, NR by AB at 6/11/2022 0842                   Point: Precautions (In Progress)     Learning Progress Summary           Patient Nonacceptance, E, NR by AB at 6/11/2022 0842    Acceptance, E, VU by MS at 6/6/2022 1138    Comment: wound vac role in pt care and healing                               User Key     Initials Effective Dates Name Provider Type Discipline    AB 08/02/16 -  Kasie Perkins, PTA Physical Therapist Assistant PT    MS 06/19/18 -  Nicole Olson, PT, DPT, NCS Physical Therapist PT              PT Recommendation and Plan     Plan of Care Reviewed With: patient  Progress: improving  Outcome Evaluation: PT treatment complete, pt is more awake and talking this morning. She performed 20 reps Supine exercises with AAROM on R LE and AROM on L LE. She was left in fowlers with call light. PT will cont to follow.   Outcome Measures     Row Name 06/12/22 0749 06/11/22 0810 06/09/22 1000       How much help from another person do you currently need...    Turning from your back to your side while in flat bed without using bedrails? 3  -AB 3  -AB 3  -LY    Moving from lying on back to sitting on the side of a flat bed without bedrails? 1  -AB 1  -AB 1  -LY    Moving to and from a bed to a chair (including a wheelchair)? 1  -AB 1  -AB 1  -LY    Standing up from a chair using your arms (e.g., wheelchair, bedside chair)? 1  -AB 1  -AB 1  -LY     Climbing 3-5 steps with a railing? 1  -AB 1  -AB 1  -LY    To walk in hospital room? 1  -AB 1  -AB 1  -LY    AM-PAC 6 Clicks Score (PT) 8  -AB 8  -AB 8  -LY       Functional Assessment    Outcome Measure Options AM-PAC 6 Clicks Basic Mobility (PT)  -AB AM-PAC 6 Clicks Basic Mobility (PT)  -AB AM-PAC 6 Clicks Basic Mobility (PT)  -LY          User Key  (r) = Recorded By, (t) = Taken By, (c) = Cosigned By    Initials Name Provider Type    Kasie Carlos PTA Physical Therapist Assistant    Bianca Hallman PTA Physical Therapist Assistant                 Time Calculation:    PT Charges     Row Name 06/12/22 0749             Time Calculation    Start Time 0749  -AB      Stop Time 0822  -AB      Time Calculation (min) 33 min  -AB      PT Received On 06/12/22  -AB              Time Calculation- PT    Total Timed Code Minutes- PT 33 minute(s)  -AB            User Key  (r) = Recorded By, (t) = Taken By, (c) = Cosigned By    Initials Name Provider Type    Kasie Carlos PTA Physical Therapist Assistant              Therapy Charges for Today     Code Description Service Date Service Provider Modifiers Qty    67119265955 HC PT THER PROC EA 15 MIN 6/11/2022 Kasie Perkins PTA GP 2    78457771094 HC PT THER PROC EA 15 MIN 6/12/2022 Kasie Perkins PTA GP 2          PT G-Codes  Outcome Measure Options: AM-PAC 6 Clicks Basic Mobility (PT)  AM-PAC 6 Clicks Score (PT): Talon Perkins PTA  6/12/2022

## 2022-06-12 NOTE — PLAN OF CARE
Goal Outcome Evaluation:  Plan of Care Reviewed With: patient        Progress: improving  Outcome Evaluation: PT treatment complete, pt is more awake and talking this morning. She performed 20 reps Supine exercises with AAROM on R LE and AROM on L LE. She was left in fowlers with call light. PT will cont to follow.

## 2022-06-12 NOTE — PLAN OF CARE
Goal Outcome Evaluation:  Plan of Care Reviewed With: patient            Patient is alert and oriented to person place Interactive. Vitals -systolic mild high. Med as ordered.Able to tolerate her oral intake. Mild pain at incision site. Dressing care completed. IV site dry and intact. IVF continue as ordered. Bed bound. Q2 turning. LBM today. Continue kay catheter, draining yellow color urine. Safety precautions maintained. Sisters at bedside. Continue to monitor.

## 2022-06-12 NOTE — PROGRESS NOTES
Infectious Diseases Progress Note    Patient:  Tawny Shin  YOB: 1955  MRN: [id number]   Admit date: 6/3/2022   Admitting Physician: Moises Oliveira MD  Primary Care Physician: Leta Julian DO    Chief Complaint/Interval History: She is awake and alert today.  She is communicative.  Her mental status seems to be back to her very close to baseline.  Saw her sister in the muro.  She was pleased with her progress as well.    Intake/Output Summary (Last 24 hours) at 6/12/2022 1214  Last data filed at 6/12/2022 0900  Gross per 24 hour   Intake 120 ml   Output 850 ml   Net -730 ml     Allergies:   Allergies   Allergen Reactions   • Atorvastatin Other (See Comments)     LEG CRAMPS     • Amoxicillin Rash   • Escitalopram Rash   • Nabumetone Rash   • Niacin Er Rash   • Penicillin G Rash   • Penicillins Rash   • Simvastatin Rash     Current Scheduled Medications:   ascorbic acid, 500 mg, Oral, Daily  aspirin, 81 mg, Oral, Daily  bisacodyl, 10 mg, Rectal, Daily  carvedilol, 25 mg, Oral, BID With Meals  ceFAZolin, 2 g, Intravenous, Q8H  collagenase, 1 application, Topical, Q24H  docusate sodium, 100 mg, Oral, BID  DULoxetine, 60 mg, Oral, Daily  enalaprilat, 0.625 mg, Intravenous, Q6H  enoxaparin, 40 mg, Subcutaneous, Q12H  famotidine, 40 mg, Oral, Daily  ferrous gluconate, 324 mg, Oral, Daily With Breakfast  fluticasone, 1 spray, Nasal, Daily  furosemide, 40 mg, Oral, Daily  isosorbide mononitrate, 60 mg, Oral, Daily  levothyroxine, 75 mcg, Oral, Q AM  lidocaine, 1 patch, Transdermal, Q24H  magnesium citrate, 150 mL, Oral, Once  magnesium oxide, 400 mg, Oral, Daily  methylnaltrexone, 12 mg, Subcutaneous, Every Other Day  multivitamin with minerals, 1 tablet, Oral, Daily  polyethylene glycol, 17 g, Oral, Daily  saccharomyces boulardii, 250 mg, Oral, Daily  sennosides-docusate, 2 tablet, Oral, BID  sodium chloride, 10 mL, Intravenous, Q12H  sucralfate, 1 g, Oral, 4x Daily AC & at  "Bedtime  thiamine, 100 mg, Oral, Daily  valACYclovir, 500 mg, Oral, Daily      Current PRN Medications:  •  acetaminophen  •  Diclofenac Sodium  •  droperidol  •  HYDROcodone-acetaminophen  •  hydrocortisone-bacitracin-zinc oxide-nystatin  •  labetalol  •  [MAR Hold] magnesium hydroxide  •  nitroglycerin  •  ondansetron **OR** ondansetron  •  [COMPLETED] Insert peripheral IV **AND** sodium chloride  •  sodium chloride  •  traMADol    Review of Systems see HPI    Vital Signs:  /43 (BP Location: Right arm, Patient Position: Lying)   Pulse 66   Temp 97.9 °F (36.6 °C) (Oral)   Resp 18   Ht 167.6 cm (66\")   Wt 81.1 kg (178 lb 12.7 oz)   LMP  (LMP Unknown)   SpO2 95%   BMI 28.86 kg/m²     Physical Exam  Vital signs - reviewed.  Line/IV site - No erythema, warmth, induration, or tenderness.  Right hip without erythema  Lungs without crackles    Lab Results:  CBC:   Results from last 7 days   Lab Units 06/12/22  0755 06/11/22  0613 06/10/22  0929 06/08/22  0700 06/06/22  0658   WBC 10*3/mm3 4.07 5.75 5.57 4.39 4.74   HEMOGLOBIN g/dL 8.4* 8.4* 8.9* 8.4* 8.6*   HEMATOCRIT % 27.7* 27.9* 29.4* 27.8* 28.9*   PLATELETS 10*3/mm3 179 205 201 225 220     BMP:  Results from last 7 days   Lab Units 06/12/22  0755 06/11/22  0613 06/10/22  1019 06/10/22  0929 06/09/22  0711 06/08/22  0700 06/06/22  0658   SODIUM mmol/L 140 138 135* 135* 136 138 135*   POTASSIUM mmol/L 3.5 3.6 3.7 3.8 3.7 3.6 4.2   CHLORIDE mmol/L 107 103 101 101 101 104 101   CO2 mmol/L 27.0 26.0 28.0 28.0 29.0 31.0* 31.0*   BUN mg/dL 13 13 12 11 11 11 13   CREATININE mg/dL 0.71 0.76 0.64 0.67 0.74 0.64 0.72   GLUCOSE mg/dL 93 83 107* 109* 116* 99 126*   CALCIUM mg/dL 8.5* 8.5* 8.5* 8.5* 8.8 8.6 8.6   ALT (SGPT) U/L 6 6 8 8 10 10 12     Culture Results:   Radiology: None  Additional Studies Reviewed: None    Impression:   Encephalopathy-improved  Right hip abscess-MSSA  Possible osteomyelitis right femur  Rheumatoid " arthritis  Hypertension  Cholelithiasis    Recommendations:   Continue cefazolin  Continue to monitor mental status  EEG pending  No change in antibiotics at present    Elie Ohara MD

## 2022-06-13 ENCOUNTER — APPOINTMENT (OUTPATIENT)
Dept: NEUROLOGY | Facility: HOSPITAL | Age: 67
End: 2022-06-13

## 2022-06-13 LAB
BACTERIA SPEC AEROBE CULT: NORMAL
GRAM STN SPEC: NORMAL
GRAM STN SPEC: NORMAL

## 2022-06-13 PROCEDURE — 25010000002 ENOXAPARIN PER 10 MG: Performed by: INTERNAL MEDICINE

## 2022-06-13 PROCEDURE — 25010000002 ONDANSETRON PER 1 MG: Performed by: SURGERY

## 2022-06-13 PROCEDURE — 25010000002 CEFAZOLIN PER 500 MG: Performed by: SURGERY

## 2022-06-13 PROCEDURE — 97530 THERAPEUTIC ACTIVITIES: CPT

## 2022-06-13 PROCEDURE — 02HV33Z INSERTION OF INFUSION DEVICE INTO SUPERIOR VENA CAVA, PERCUTANEOUS APPROACH: ICD-10-PCS | Performed by: INTERNAL MEDICINE

## 2022-06-13 PROCEDURE — 95819 EEG AWAKE AND ASLEEP: CPT

## 2022-06-13 PROCEDURE — 95819 EEG AWAKE AND ASLEEP: CPT | Performed by: PSYCHIATRY & NEUROLOGY

## 2022-06-13 RX ADMIN — POLYETHYLENE GLYCOL 3350 17 G: 17 POWDER, FOR SOLUTION ORAL at 08:21

## 2022-06-13 RX ADMIN — SUCRALFATE 1 G: 1 TABLET ORAL at 22:00

## 2022-06-13 RX ADMIN — DULOXETINE HYDROCHLORIDE 60 MG: 30 CAPSULE, DELAYED RELEASE ORAL at 08:19

## 2022-06-13 RX ADMIN — COLLAGENASE SANTYL 1 APPLICATION: 250 OINTMENT TOPICAL at 08:22

## 2022-06-13 RX ADMIN — ENALAPRILAT 0.62 MG: 1.25 INJECTION INTRAVENOUS at 11:01

## 2022-06-13 RX ADMIN — CEFAZOLIN 2 G: 10 INJECTION, POWDER, FOR SOLUTION INTRAVENOUS at 05:57

## 2022-06-13 RX ADMIN — DOCUSATE SODIUM 50 MG AND SENNOSIDES 8.6 MG 2 TABLET: 8.6; 5 TABLET, FILM COATED ORAL at 22:00

## 2022-06-13 RX ADMIN — VALACYCLOVIR HYDROCHLORIDE 500 MG: 500 TABLET, FILM COATED ORAL at 08:20

## 2022-06-13 RX ADMIN — Medication 250 MG: at 08:20

## 2022-06-13 RX ADMIN — Medication 10 ML: at 08:21

## 2022-06-13 RX ADMIN — Medication 324 MG: at 08:20

## 2022-06-13 RX ADMIN — ENALAPRILAT 0.62 MG: 1.25 INJECTION INTRAVENOUS at 06:16

## 2022-06-13 RX ADMIN — FAMOTIDINE 40 MG: 20 TABLET, FILM COATED ORAL at 08:20

## 2022-06-13 RX ADMIN — LABETALOL HYDROCHLORIDE 10 MG: 5 INJECTION INTRAVENOUS at 04:01

## 2022-06-13 RX ADMIN — LIDOCAINE 1 PATCH: 50 PATCH CUTANEOUS at 08:20

## 2022-06-13 RX ADMIN — LEVOTHYROXINE SODIUM 75 MCG: 75 TABLET ORAL at 05:57

## 2022-06-13 RX ADMIN — ENALAPRILAT 0.62 MG: 1.25 INJECTION INTRAVENOUS at 17:28

## 2022-06-13 RX ADMIN — SUCRALFATE 1 G: 1 TABLET ORAL at 17:28

## 2022-06-13 RX ADMIN — HYDROCODONE BITARTRATE AND ACETAMINOPHEN 1 TABLET: 7.5; 325 TABLET ORAL at 22:29

## 2022-06-13 RX ADMIN — ASPIRIN 81 MG: 81 TABLET, COATED ORAL at 08:20

## 2022-06-13 RX ADMIN — DOCUSATE SODIUM 100 MG: 100 CAPSULE, LIQUID FILLED ORAL at 22:00

## 2022-06-13 RX ADMIN — ENOXAPARIN SODIUM 40 MG: 100 INJECTION SUBCUTANEOUS at 11:01

## 2022-06-13 RX ADMIN — CARVEDILOL 25 MG: 25 TABLET, FILM COATED ORAL at 17:28

## 2022-06-13 RX ADMIN — ENOXAPARIN SODIUM 40 MG: 100 INJECTION SUBCUTANEOUS at 23:43

## 2022-06-13 RX ADMIN — ONDANSETRON 4 MG: 2 INJECTION INTRAMUSCULAR; INTRAVENOUS at 08:35

## 2022-06-13 RX ADMIN — CEFAZOLIN 2 G: 10 INJECTION, POWDER, FOR SOLUTION INTRAVENOUS at 22:29

## 2022-06-13 RX ADMIN — SUCRALFATE 1 G: 1 TABLET ORAL at 05:57

## 2022-06-13 RX ADMIN — SUCRALFATE 1 G: 1 TABLET ORAL at 11:01

## 2022-06-13 RX ADMIN — ENALAPRILAT 0.62 MG: 1.25 INJECTION INTRAVENOUS at 23:43

## 2022-06-13 RX ADMIN — FLUTICASONE PROPIONATE 1 SPRAY: 50 SPRAY, METERED NASAL at 08:21

## 2022-06-13 RX ADMIN — POTASSIUM CHLORIDE, DEXTROSE MONOHYDRATE AND SODIUM CHLORIDE 75 ML/HR: 150; 5; 900 INJECTION, SOLUTION INTRAVENOUS at 06:05

## 2022-06-13 RX ADMIN — ISOSORBIDE MONONITRATE 60 MG: 60 TABLET, EXTENDED RELEASE ORAL at 08:20

## 2022-06-13 RX ADMIN — Medication 1 TABLET: at 08:20

## 2022-06-13 RX ADMIN — DOCUSATE SODIUM 100 MG: 100 CAPSULE, LIQUID FILLED ORAL at 08:20

## 2022-06-13 RX ADMIN — OXYCODONE HYDROCHLORIDE AND ACETAMINOPHEN 500 MG: 500 TABLET ORAL at 08:20

## 2022-06-13 RX ADMIN — CARVEDILOL 25 MG: 25 TABLET, FILM COATED ORAL at 08:20

## 2022-06-13 RX ADMIN — THIAMINE HCL TAB 100 MG 100 MG: 100 TAB at 08:20

## 2022-06-13 RX ADMIN — LABETALOL HYDROCHLORIDE 10 MG: 5 INJECTION INTRAVENOUS at 08:17

## 2022-06-13 RX ADMIN — ONDANSETRON 4 MG: 2 INJECTION INTRAMUSCULAR; INTRAVENOUS at 17:32

## 2022-06-13 RX ADMIN — MAGNESIUM GLUCONATE 500 MG ORAL TABLET 400 MG: 500 TABLET ORAL at 08:20

## 2022-06-13 RX ADMIN — FUROSEMIDE 40 MG: 40 TABLET ORAL at 08:20

## 2022-06-13 RX ADMIN — CEFAZOLIN 2 G: 10 INJECTION, POWDER, FOR SOLUTION INTRAVENOUS at 15:03

## 2022-06-13 RX ADMIN — DOCUSATE SODIUM 50 MG AND SENNOSIDES 8.6 MG 2 TABLET: 8.6; 5 TABLET, FILM COATED ORAL at 08:21

## 2022-06-13 NOTE — PLAN OF CARE
Goal Outcome Evaluation:  Plan of Care Reviewed With: patient        Progress: no change  Outcome Evaluation: Pt medicated for elevated bp prn x 1, sched med given, no c/o headache/dizziness. No c/o pain voiced, c/o ankle soreness. Pt did request prn med prior todrsg change and stated that it helped the process. Assist w/repositioning and kay care. BLE elevated. Safety maintained.

## 2022-06-13 NOTE — PLAN OF CARE
Goal Outcome Evaluation:  Plan of Care Reviewed With: patient        Progress: no change  Outcome Evaluation: A&OX4, forgetful at times, BP elevated this AM -PRN BP meds given with some improvement. C/o minor hip pain, PRN pain med not wanted at this time. Assist x1 to turn in bed or to sit edge of bed. Chronic kay catheter in place, CDI, kay care completed. On room air and . D5w/20k+ @75 mL/hr running. EEG completed this shift d/t lethargy and confusion noted on 6/11/22. Right hip dressing changed, CDI. RLE dressing changed, CDI. IV ABX given. Lovenox for VTE prevention. Bed alarm set, call light in reach. Safety maintained and continue to monitor.

## 2022-06-13 NOTE — PLAN OF CARE
Goal Outcome Evaluation:  Plan of Care Reviewed With: patient        Progress: improving  Outcome Evaluation: Pt agreed to PT and c/o only mild hip pain. Pt completed AROM ex's x 20 reps L LE and AAROM-AROM on R LE. Pt was mod x 2 using lift pad for supine to sit. Pt leans hard to L side and pt's family states that is her baseline. Pt when cued was cga-sba for short periods in sitting but was mostly mod x 1. Pt was mod x 2 sit to supine and max x 2 to scoot up in bed. Pt would benefit from continued PT at [facility].

## 2022-06-13 NOTE — PROGRESS NOTES
"INFECTIOUS DISEASES PROGRESS NOTE    Patient:  Tawny Shin  YOB: 1955  MRN: [id number]   Admit date: 6/3/2022   Admitting Physician: Moises Oliveira MD  Primary Care Physician: Leta Julian DO    Chief Complaint: \" I would love to have my hair washed\"        Interval History: Patient notes she has not felt good today.  She has had some nausea.  I asked if there is anything I could do and she said \"I would love to have my hair washed\".  I checked with nursing staff and they have caps they can use to wash her hair.    Allergies:   Allergies   Allergen Reactions   • Atorvastatin Other (See Comments)     LEG CRAMPS     • Amoxicillin Rash   • Escitalopram Rash   • Nabumetone Rash   • Niacin Er Rash   • Penicillin G Rash   • Penicillins Rash   • Simvastatin Rash       Current Scheduled Medications:   ascorbic acid, 500 mg, Oral, Daily  aspirin, 81 mg, Oral, Daily  bisacodyl, 10 mg, Rectal, Daily  carvedilol, 25 mg, Oral, BID With Meals  ceFAZolin, 2 g, Intravenous, Q8H  collagenase, 1 application, Topical, Q24H  docusate sodium, 100 mg, Oral, BID  DULoxetine, 60 mg, Oral, Daily  enalaprilat, 0.625 mg, Intravenous, Q6H  enoxaparin, 40 mg, Subcutaneous, Q12H  famotidine, 40 mg, Oral, Daily  ferrous gluconate, 324 mg, Oral, Daily With Breakfast  fluticasone, 1 spray, Nasal, Daily  furosemide, 40 mg, Oral, Daily  isosorbide mononitrate, 60 mg, Oral, Daily  levothyroxine, 75 mcg, Oral, Q AM  lidocaine, 1 patch, Transdermal, Q24H  magnesium citrate, 150 mL, Oral, Once  magnesium oxide, 400 mg, Oral, Daily  methylnaltrexone, 12 mg, Subcutaneous, Every Other Day  multivitamin with minerals, 1 tablet, Oral, Daily  polyethylene glycol, 17 g, Oral, Daily  saccharomyces boulardii, 250 mg, Oral, Daily  sennosides-docusate, 2 tablet, Oral, BID  sodium chloride, 10 mL, Intravenous, Q12H  sucralfate, 1 g, Oral, 4x Daily AC & at Bedtime  thiamine, 100 mg, Oral, Daily  valACYclovir, 500 mg, Oral, " "Daily      Current PRN Medications:  •  acetaminophen  •  Diclofenac Sodium  •  droperidol  •  HYDROcodone-acetaminophen  •  hydrocortisone-bacitracin-zinc oxide-nystatin  •  labetalol  •  [MAR Hold] magnesium hydroxide  •  nitroglycerin  •  ondansetron **OR** ondansetron  •  [COMPLETED] Insert peripheral IV **AND** sodium chloride  •  sodium chloride  •  traMADol    Review of Systems   Constitutional: Negative for fever.   Skin: Positive for wound.       Objective     Vital Signs:  Temp (24hrs), Av °F (36.7 °C), Min:97.5 °F (36.4 °C), Max:98.7 °F (37.1 °C)      BP (!) 184/59 (BP Location: Left arm, Patient Position: Lying) Comment: NURSE NOTIFIED  Pulse 63   Temp 98 °F (36.7 °C) (Oral)   Resp 16   Ht 167.6 cm (66\")   Wt 81.1 kg (178 lb 12.7 oz)   LMP  (LMP Unknown)   SpO2 97%   BMI 28.86 kg/m²         Physical Exam  General: Patient was sitting up in bed and in no acute distress.  She did have an emesis bag next to her  HEENT: Sclera anicteric and noninjected  Respiratory: Effort even and unlabored  Right hip large dressing in place that was dry and intact  Psych: She was pleasant and cooperative      Results Review:    I reviewed the patient's new clinical results.    Lab Results:  CBC:   Lab Results   Lab 22  0700 06/10/22  0929 22  0613 22  0755   WBC 4.39 5.57 5.75 4.07   HEMOGLOBIN 8.4* 8.9* 8.4* 8.4*   HEMATOCRIT 27.8* 29.4* 27.9* 27.7*   PLATELETS 225 201 205 179         CMP:   Lab Results   Lab 06/10/22  1019 22  0613 22  0755   SODIUM 135* 138 140   POTASSIUM 3.7 3.6 3.5   CHLORIDE 101 103 107   CO2 28.0 26.0 27.0   BUN 12 13 13   CREATININE 0.64 0.76 0.71   CALCIUM 8.5* 8.5* 8.5*   BILIRUBIN 0.3 0.3 0.2   ALK PHOS 93 84 84   ALT (SGPT) 8 6 6   AST (SGOT) 13 13 14   GLUCOSE 107* 83 93       Estimated Creatinine Clearance: 81.4 mL/min (by C-G formula based on SCr of 0.71 mg/dL).    Culture Results:    Microbiology Results (last 10 days)     Procedure Component Value " - Date/Time    Tissue / Bone Culture - Bone, Hip, Right [[id number]] Collected: 06/10/22 1607    Lab Status: Final result Specimen: Bone from Hip, Right Updated: 06/13/22 0938     Tissue Culture No growth at 3 days     Gram Stain Rare (1+) WBCs per low power field      No organisms seen    AFB Culture - Bone, Hip, Right [[id number]] Collected: 06/10/22 1607    Lab Status: Preliminary result Specimen: Bone from Hip, Right Updated: 06/12/22 1147     AFB Stain No acid fast bacilli seen on direct smear      No acid fast bacilli seen on concentrated smear    COVID PRE-OP / PRE-PROCEDURE SCREENING ORDER (NO ISOLATION) - Swab, Nasal Cavity [[id number]]  (Normal) Collected: 06/10/22 1442    Lab Status: Final result Specimen: Swab from Nasal Cavity Updated: 06/10/22 1534    Narrative:      The following orders were created for panel order COVID PRE-OP / PRE-PROCEDURE SCREENING ORDER (NO ISOLATION) - Swab, Nasal Cavity.  Procedure                               Abnormality         Status                     ---------                               -----------         ------                     COVID-19,[facility]...[[id number]]  Normal              Final result                 Please view results for these tests on the individual orders.    COVID-19,[facility]HOU[facility],Nasal Swab No Transport Media 3-4 HR TAT - Swab, Nasal Cavity [[id number]]  (Normal) Collected: 06/10/22 1442    Lab Status: Final result Specimen: Swab from Nasal Cavity Updated: 06/10/22 1534     COVID19 Not Detected    Narrative:      Fact sheet for providers: https://www.fda.gov/media/902814/download     Fact sheet for patients: https://www.fda.gov/media/493025/download    Test performed by PCR.    Consider negative results in combination with clinical observations, patient history, and epidemiological information.  Fact sheet for providers: https://www.fda.gov/media/417792/download     Fact sheet for patients: https://www.fda.gov/media/385554/download    Test  performed by PCR.    Consider negative results in combination with clinical observations, patient history, and epidemiological information.    Wound Culture - Wound, Hip, Right [[id number]]  (Abnormal) Collected: 06/04/22 0834    Lab Status: Final result Specimen: Wound from Hip, Right Updated: 06/06/22 0942     Wound Culture Scant growth (1+) Staphylococcus aureus     Gram Stain Moderate (3+) WBCs seen      Few (2+) Gram positive cocci    Narrative:      Refer to previous blood culture collected on 6/4/2022 0428 for MICs    Tissue / Bone Culture - Tissue, Hip, Right [[id number]]  (Abnormal) Collected: 06/04/22 0834    Lab Status: Final result Specimen: Tissue from Hip, Right Updated: 06/06/22 0942     Tissue Culture Light growth (2+) Staphylococcus aureus     Gram Stain Many (4+) WBCs seen      Few (2+) Gram positive cocci    Narrative:      Refer to previous blood culture collected on 6/4/2022 0428 for MICs      Wound Culture - Wound, Hip [[id number]]  (Abnormal)  (Susceptibility) Collected: 06/04/22 0328    Lab Status: Final result Specimen: Wound from Hip Updated: 06/06/22 0941     Wound Culture Light growth (2+) Staphylococcus aureus     Gram Stain Moderate (3+) WBCs seen      Few (2+) Gram positive cocci, intracellular and extracellular    Susceptibility      Staphylococcus aureus      LICHA      Clindamycin Resistant     Erythromycin Resistant     Inducible Clindamycin Resistance Negative      Oxacillin Susceptible      Rifampin Susceptible      Tetracycline Resistant     Trimethoprim + Sulfamethoxazole Susceptible      Vancomycin Susceptible                           Urine Culture - Urine, Urine, Clean Catch [[id number]]  (Abnormal)  (Susceptibility) Collected: 06/04/22 0130    Lab Status: Final result Specimen: Urine, Clean Catch Updated: 06/06/22 1057     Urine Culture >100,000 CFU/mL Pseudomonas aeruginosa      >100,000 CFU/mL Escherichia coli    Narrative:      Colonization of the urinary tract without  infection is common. Treatment is discouraged unless the patient is symptomatic, pregnant, or undergoing an invasive urologic procedure.    Susceptibility      Pseudomonas aeruginosa      LICHA      Cefepime Susceptible      Ceftazidime Susceptible      Ciprofloxacin Susceptible      Gentamicin Susceptible      Levofloxacin Susceptible      Piperacillin + Tazobactam Susceptible                       Susceptibility      Escherichia coli      LICHA      Ampicillin Resistant     Ampicillin + Sulbactam Susceptible      Cefazolin Susceptible      Cefepime Susceptible      Ceftazidime Susceptible      Ceftriaxone Susceptible      Gentamicin Resistant     Levofloxacin Resistant     Nitrofurantoin Susceptible      Piperacillin + Tazobactam Susceptible      Tobramycin Susceptible      Trimethoprim + Sulfamethoxazole Resistant                          COVID PRE-OP / PRE-PROCEDURE SCREENING ORDER (NO ISOLATION) - Swab, Nasal Cavity [[id number]]  (Normal) Collected: 06/04/22 0126    Lab Status: Final result Specimen: Swab from Nasal Cavity Updated: 06/04/22 0208    Narrative:      The following orders were created for panel order COVID PRE-OP / PRE-PROCEDURE SCREENING ORDER (NO ISOLATION) - Swab, Nasal Cavity.  Procedure                               Abnormality         Status                     ---------                               -----------         ------                     COVID-19,[facility]...[[id number]]  Normal              Final result                 Please view results for these tests on the individual orders.    COVID-19,[facility],Nasal Swab No Transport Media 3-4 HR TAT - Swab, Nasal Cavity [[id number]]  (Normal) Collected: 06/04/22 0126    Lab Status: Final result Specimen: Swab from Nasal Cavity Updated: 06/04/22 0208     COVID19 Not Detected    Narrative:      Fact sheet for providers: https://www.fda.gov/media/689573/download     Fact sheet for patients:  https://www.fda.gov/media/430523/download    Test performed by PCR.    Consider negative results in combination with clinical observations, patient history, and epidemiological information.    Blood Culture - Blood, Arm, Right [[id number]]  (Normal) Collected: 06/03/22 2229    Lab Status: Final result Specimen: Blood from Arm, Right Updated: 06/08/22 2247     Blood Culture No growth at 5 days    Blood Culture - Blood, Arm, Right [[id number]]  (Normal) Collected: 06/03/22 2229    Lab Status: Final result Specimen: Blood from Arm, Right Updated: 06/08/22 2247     Blood Culture No growth at 5 days               Radiology:   Imaging Results (Last 72 Hours)     Procedure Component Value Units Date/Time    XR Chest 1 View [[id number]] Collected: 06/11/22 1522     Updated: 06/11/22 1526    Narrative:      EXAMINATION: XR CHEST 1 VW-     6/11/2022 2:04 PM CDT     HISTORY: hypoxemia     1 view chest x-ray.     Comparison is made with June 4, 2022.     Stable heart size.  Unchanged left cardiac pacer.     Interstitial lung disease is again seen and appears to be chronic.     No pneumothorax.     No focal consolidation is seen.     Summary:  1. Chronic interstitial lung disease.  This report was finalized on 06/11/2022 15:23 by Dr. Jesus Manuel Santos MD.    MRI Brain With & Without Contrast [[id number]] Collected: 06/10/22 1943     Updated: 06/10/22 1959    Narrative:      EXAMINATION:  MRI BRAIN W WO CONTRAST-  6/10/2022 7:00 PM CDT     HISTORY: Mental status change, unknown cause.     TECHNIQUE: Multiplanar imaging was performed in a high field magnet  before and after gadolinium contrast administration.     COMPARISON: 2/9/2021.     FINDINGS: There is no evidence of acute infarct on the  diffusion-weighted sequence. There are scattered areas of T2 high signal  within the hemispheric periventricular white matter. There is a rim of  T2 high signal seen best on the FLAIR sequence around the lateral  ventricles. There is mild atrophy.  There is moderate dilatation of the  lateral and third ventricles. There are no mass lesions. There are no  areas of abnormal contrast enhancement intracranially. There is a  partially empty appearance of the sella turcica. There is a small amount  of fluid in the optic nerve sheaths. The transverse sinuses are normal  in size. There is mucosal thickening in the paranasal sinuses  predominantly in the maxillary, ethmoid and sphenoid sinuses. There is  T1 high signal within the maxillary and sphenoid sinuses and to a lesser  extent within the posterior left ethmoid sinus. These areas demonstrate  relative flow void on T2 images. There is mucosal enhancement of the  paranasal sinuses.       Impression:      1. Scattered areas of T2 high signal within the hemispheric white matter  are nonspecific and likely due to chronic small vessel disease.  2. Mild atrophy.  3. There is moderate dilatation of the lateral and third ventricles.  This may be related to the atrophy. There is a band of T2 high signal  seen best on the FLAIR sequence surrounding the lateral ventricles  suggesting possible transependymal flow. Hydrocephalus is not ruled out.  4. Sinus disease, as described. T1 high signal within the maxillary,  sphenoid and posterior left ethmoid region could be related to  inspissated secretions. Fungal sinusitis is also in the differential  diagnosis.     This report was finalized on 06/10/2022 19:56 by Dr. Zurdo Vazquez MD.          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 Methicillin susceptible Staphylococcus aureus infection right hip.  Possible osteomyelitis.  Bone culture from right hip is negative however patient had previously been on antibiotics and has concerning findings on MRI  2. Encephalopathy-improved  3. Rheumatoid arthritis      RECOMMENDATION:   · Continue cefazolin  · At this point would plan on approximately 4 weeks IV cefazolin, possibly 6 weeks and then transition to oral therapy        Maggie Bang MD  06/13/22  17:06 CDT

## 2022-06-13 NOTE — PLAN OF CARE
Goal Outcome Evaluation:  Plan of Care Reviewed With: patient        Progress: no change     Patient vomiting earlier in day.  Not wanting to eat at this time.  She reports some memory deficits but can hold conversation with SLP without difficulty noted.  SLP will follow up for R-BANS to gain more objective view of Memory tomorrow when patient not having n/v and able to participate better.

## 2022-06-13 NOTE — CASE MANAGEMENT/SOCIAL WORK
Continued Stay Note   [facility]     Patient Name: Tawny Shin  MRN: [id number]  Today's Date: 6/13/2022    Admit Date: 6/3/2022     Discharge Plan     Row Name 06/13/22 0829       Plan    Plan  Referral to Continue Care [facility]- [facility]    Patient/Family in Agreement with Plan yes    Plan Comments SS following for return to [facility]. At this point, it is unclear when patient will be medically ready for discharge but patient has a bed hold at [facility].    LTAC ordered, informed Naida there of referral [phone].               Discharge Codes    No documentation.               Expected Discharge Date and Time     Expected Discharge Date Expected Discharge Time    Jun 14, 2022             BRIAN Muñoz

## 2022-06-14 ENCOUNTER — HOSPITAL ENCOUNTER (OUTPATIENT)
Facility: HOSPITAL | Age: 67
Discharge: NURSING, REHAB OR LONG-TERM CARE | End: 2022-07-06
Attending: INTERNAL MEDICINE | Admitting: INTERNAL MEDICINE

## 2022-06-14 VITALS
RESPIRATION RATE: 16 BRPM | SYSTOLIC BLOOD PRESSURE: 141 MMHG | WEIGHT: 178.79 LBS | OXYGEN SATURATION: 96 % | DIASTOLIC BLOOD PRESSURE: 55 MMHG | BODY MASS INDEX: 28.73 KG/M2 | HEART RATE: 61 BPM | TEMPERATURE: 98.1 F | HEIGHT: 66 IN

## 2022-06-14 LAB
ALBUMIN SERPL-MCNC: 2.5 G/DL (ref 3.5–5.2)
ALBUMIN/GLOB SERPL: 0.6 G/DL
ALP SERPL-CCNC: 92 U/L (ref 39–117)
ALT SERPL W P-5'-P-CCNC: 6 U/L (ref 1–33)
ANION GAP SERPL CALCULATED.3IONS-SCNC: 5 MMOL/L (ref 5–15)
AST SERPL-CCNC: 23 U/L (ref 1–32)
BH BB BLOOD EXPIRATION DATE: NORMAL
BH BB BLOOD EXPIRATION DATE: NORMAL
BH BB BLOOD TYPE BARCODE: 6200
BH BB BLOOD TYPE BARCODE: 6200
BH BB DISPENSE STATUS: NORMAL
BH BB DISPENSE STATUS: NORMAL
BH BB PRODUCT CODE: NORMAL
BH BB PRODUCT CODE: NORMAL
BH BB UNIT NUMBER: NORMAL
BH BB UNIT NUMBER: NORMAL
BILIRUB SERPL-MCNC: 0.2 MG/DL (ref 0–1.2)
BUN SERPL-MCNC: 9 MG/DL (ref 8–23)
BUN/CREAT SERPL: 14.5 (ref 7–25)
CALCIUM SPEC-SCNC: 8.3 MG/DL (ref 8.6–10.5)
CHLORIDE SERPL-SCNC: 107 MMOL/L (ref 98–107)
CO2 SERPL-SCNC: 26 MMOL/L (ref 22–29)
CREAT SERPL-MCNC: 0.62 MG/DL (ref 0.57–1)
CROSSMATCH INTERPRETATION: NORMAL
CROSSMATCH INTERPRETATION: NORMAL
DEPRECATED RDW RBC AUTO: 51.1 FL (ref 37–54)
EGFRCR SERPLBLD CKD-EPI 2021: 97.1 ML/MIN/1.73
ERYTHROCYTE [DISTWIDTH] IN BLOOD BY AUTOMATED COUNT: 16.2 % (ref 12.3–15.4)
GLOBULIN UR ELPH-MCNC: 4.3 GM/DL
GLUCOSE SERPL-MCNC: 102 MG/DL (ref 65–99)
HCT VFR BLD AUTO: 26.3 % (ref 34–46.6)
HGB BLD-MCNC: 8 G/DL (ref 12–15.9)
MCH RBC QN AUTO: 26.6 PG (ref 26.6–33)
MCHC RBC AUTO-ENTMCNC: 30.4 G/DL (ref 31.5–35.7)
MCV RBC AUTO: 87.4 FL (ref 79–97)
PLATELET # BLD AUTO: 171 10*3/MM3 (ref 140–450)
PMV BLD AUTO: 9.4 FL (ref 6–12)
POTASSIUM SERPL-SCNC: 4 MMOL/L (ref 3.5–5.2)
PROT SERPL-MCNC: 6.8 G/DL (ref 6–8.5)
RBC # BLD AUTO: 3.01 10*6/MM3 (ref 3.77–5.28)
SODIUM SERPL-SCNC: 138 MMOL/L (ref 136–145)
UNIT  ABO: NORMAL
UNIT  ABO: NORMAL
UNIT  RH: NORMAL
UNIT  RH: NORMAL
WBC NRBC COR # BLD: 4.4 10*3/MM3 (ref 3.4–10.8)

## 2022-06-14 PROCEDURE — 87102 FUNGUS ISOLATION CULTURE: CPT | Performed by: INTERNAL MEDICINE

## 2022-06-14 PROCEDURE — 25010000002 CEFAZOLIN PER 500 MG: Performed by: INTERNAL MEDICINE

## 2022-06-14 PROCEDURE — 25010000002 CEFAZOLIN PER 500 MG: Performed by: SURGERY

## 2022-06-14 PROCEDURE — 0 LIDOCAINE 1 % SOLUTION: Performed by: INTERNAL MEDICINE

## 2022-06-14 PROCEDURE — 80053 COMPREHEN METABOLIC PANEL: CPT | Performed by: INTERNAL MEDICINE

## 2022-06-14 PROCEDURE — 25010000002 ENOXAPARIN PER 10 MG: Performed by: INTERNAL MEDICINE

## 2022-06-14 PROCEDURE — 85027 COMPLETE CBC AUTOMATED: CPT | Performed by: INTERNAL MEDICINE

## 2022-06-14 PROCEDURE — 25010000002 METHYLNALTREXONE 12 MG/0.6ML SOLUTION: Performed by: SURGERY

## 2022-06-14 PROCEDURE — C1751 CATH, INF, PER/CENT/MIDLINE: HCPCS

## 2022-06-14 PROCEDURE — 99233 SBSQ HOSP IP/OBS HIGH 50: CPT | Performed by: NURSE PRACTITIONER

## 2022-06-14 PROCEDURE — 97530 THERAPEUTIC ACTIVITIES: CPT

## 2022-06-14 PROCEDURE — 96125 COGNITIVE TEST BY HC PRO: CPT | Performed by: SPEECH-LANGUAGE PATHOLOGIST

## 2022-06-14 RX ORDER — HYDROCODONE BITARTRATE AND ACETAMINOPHEN 7.5; 325 MG/1; MG/1
1 TABLET ORAL EVERY 8 HOURS PRN
Status: DISCONTINUED | OUTPATIENT
Start: 2022-06-14 | End: 2022-07-06 | Stop reason: HOSPADM

## 2022-06-14 RX ORDER — BISACODYL 10 MG
10 SUPPOSITORY, RECTAL RECTAL DAILY
Status: DISCONTINUED | OUTPATIENT
Start: 2022-06-15 | End: 2022-07-06 | Stop reason: HOSPADM

## 2022-06-14 RX ORDER — ONDANSETRON 2 MG/ML
4 INJECTION INTRAMUSCULAR; INTRAVENOUS EVERY 6 HOURS PRN
Status: DISCONTINUED | OUTPATIENT
Start: 2022-06-14 | End: 2022-07-06 | Stop reason: HOSPADM

## 2022-06-14 RX ORDER — TRAMADOL HYDROCHLORIDE 50 MG/1
100 TABLET ORAL 3 TIMES DAILY PRN
Status: DISCONTINUED | OUTPATIENT
Start: 2022-06-14 | End: 2022-07-06 | Stop reason: HOSPADM

## 2022-06-14 RX ORDER — BUPIVACAINE HCL/0.9 % NACL/PF 0.1 %
2 PLASTIC BAG, INJECTION (ML) EPIDURAL EVERY 8 HOURS SCHEDULED
Status: DISCONTINUED | OUTPATIENT
Start: 2022-06-14 | End: 2022-07-06 | Stop reason: HOSPADM

## 2022-06-14 RX ORDER — DEXTROSE, SODIUM CHLORIDE, AND POTASSIUM CHLORIDE 5; .9; .15 G/100ML; G/100ML; G/100ML
75 INJECTION INTRAVENOUS CONTINUOUS
Status: DISCONTINUED | OUTPATIENT
Start: 2022-06-14 | End: 2022-06-22

## 2022-06-14 RX ORDER — SUCRALFATE 1 G/1
1 TABLET ORAL
Status: DISCONTINUED | OUTPATIENT
Start: 2022-06-14 | End: 2022-07-06 | Stop reason: HOSPADM

## 2022-06-14 RX ORDER — ENOXAPARIN SODIUM 100 MG/ML
40 INJECTION SUBCUTANEOUS EVERY 12 HOURS SCHEDULED
Status: DISCONTINUED | OUTPATIENT
Start: 2022-06-15 | End: 2022-06-15

## 2022-06-14 RX ORDER — FLUTICASONE PROPIONATE 50 MCG
1 SPRAY, SUSPENSION (ML) NASAL DAILY
Status: DISCONTINUED | OUTPATIENT
Start: 2022-06-15 | End: 2022-07-06 | Stop reason: HOSPADM

## 2022-06-14 RX ORDER — ACETAMINOPHEN 325 MG/1
650 TABLET ORAL EVERY 4 HOURS PRN
Status: DISCONTINUED | OUTPATIENT
Start: 2022-06-14 | End: 2022-07-06 | Stop reason: HOSPADM

## 2022-06-14 RX ORDER — LIDOCAINE 50 MG/G
1 PATCH TOPICAL
Status: DISCONTINUED | OUTPATIENT
Start: 2022-06-15 | End: 2022-07-06 | Stop reason: HOSPADM

## 2022-06-14 RX ORDER — ACETAMINOPHEN 650 MG/1
650 SUPPOSITORY RECTAL EVERY 4 HOURS PRN
Status: DISCONTINUED | OUTPATIENT
Start: 2022-06-14 | End: 2022-07-06 | Stop reason: HOSPADM

## 2022-06-14 RX ORDER — DULOXETIN HYDROCHLORIDE 30 MG/1
60 CAPSULE, DELAYED RELEASE ORAL DAILY
Status: DISCONTINUED | OUTPATIENT
Start: 2022-06-15 | End: 2022-07-06 | Stop reason: HOSPADM

## 2022-06-14 RX ORDER — NITROGLYCERIN 0.4 MG/1
0.4 TABLET SUBLINGUAL
Status: DISCONTINUED | OUTPATIENT
Start: 2022-06-14 | End: 2022-07-06 | Stop reason: HOSPADM

## 2022-06-14 RX ORDER — ASCORBIC ACID 500 MG
500 TABLET ORAL DAILY
Status: DISCONTINUED | OUTPATIENT
Start: 2022-06-15 | End: 2022-07-06 | Stop reason: HOSPADM

## 2022-06-14 RX ORDER — FAMOTIDINE 20 MG/1
40 TABLET, FILM COATED ORAL DAILY
Status: DISCONTINUED | OUTPATIENT
Start: 2022-06-15 | End: 2022-07-06 | Stop reason: HOSPADM

## 2022-06-14 RX ORDER — FUROSEMIDE 40 MG/1
40 TABLET ORAL DAILY
Status: DISCONTINUED | OUTPATIENT
Start: 2022-06-15 | End: 2022-07-06 | Stop reason: HOSPADM

## 2022-06-14 RX ORDER — CARVEDILOL 25 MG/1
25 TABLET ORAL 2 TIMES DAILY WITH MEALS
Status: DISCONTINUED | OUTPATIENT
Start: 2022-06-15 | End: 2022-07-06 | Stop reason: HOSPADM

## 2022-06-14 RX ORDER — AMOXICILLIN 250 MG
2 CAPSULE ORAL 2 TIMES DAILY
Status: DISCONTINUED | OUTPATIENT
Start: 2022-06-14 | End: 2022-07-06 | Stop reason: HOSPADM

## 2022-06-14 RX ORDER — MULTIPLE VITAMINS W/ MINERALS TAB 9MG-400MCG
1 TAB ORAL DAILY
Status: DISCONTINUED | OUTPATIENT
Start: 2022-06-15 | End: 2022-07-06 | Stop reason: HOSPADM

## 2022-06-14 RX ORDER — LIDOCAINE HYDROCHLORIDE 10 MG/ML
5 INJECTION, SOLUTION INFILTRATION; PERINEURAL ONCE
Status: COMPLETED | OUTPATIENT
Start: 2022-06-14 | End: 2022-06-14

## 2022-06-14 RX ORDER — LABETALOL HYDROCHLORIDE 5 MG/ML
10 INJECTION, SOLUTION INTRAVENOUS EVERY 4 HOURS PRN
Status: DISCONTINUED | OUTPATIENT
Start: 2022-06-14 | End: 2022-07-06 | Stop reason: HOSPADM

## 2022-06-14 RX ORDER — ENALAPRILAT 2.5 MG/2ML
0.62 INJECTION INTRAVENOUS EVERY 6 HOURS
Status: DISCONTINUED | OUTPATIENT
Start: 2022-06-15 | End: 2022-06-15

## 2022-06-14 RX ORDER — FERROUS GLUCONATE 324(37.5)
324 TABLET ORAL
Status: DISCONTINUED | OUTPATIENT
Start: 2022-06-15 | End: 2022-07-06 | Stop reason: HOSPADM

## 2022-06-14 RX ORDER — ONDANSETRON 4 MG/1
4 TABLET, FILM COATED ORAL EVERY 6 HOURS PRN
Status: DISCONTINUED | OUTPATIENT
Start: 2022-06-14 | End: 2022-07-06 | Stop reason: HOSPADM

## 2022-06-14 RX ORDER — DROPERIDOL 2.5 MG/ML
1.25 INJECTION, SOLUTION INTRAMUSCULAR; INTRAVENOUS EVERY 6 HOURS PRN
Status: DISCONTINUED | OUTPATIENT
Start: 2022-06-14 | End: 2022-07-06 | Stop reason: HOSPADM

## 2022-06-14 RX ORDER — POLYETHYLENE GLYCOL 3350 17 G/17G
17 POWDER, FOR SOLUTION ORAL DAILY
Status: DISCONTINUED | OUTPATIENT
Start: 2022-06-15 | End: 2022-06-16

## 2022-06-14 RX ORDER — ASPIRIN 81 MG/1
81 TABLET ORAL DAILY
Status: DISCONTINUED | OUTPATIENT
Start: 2022-06-15 | End: 2022-07-06 | Stop reason: HOSPADM

## 2022-06-14 RX ORDER — DOCUSATE SODIUM 100 MG/1
100 CAPSULE, LIQUID FILLED ORAL 2 TIMES DAILY
Status: DISCONTINUED | OUTPATIENT
Start: 2022-06-14 | End: 2022-07-06 | Stop reason: HOSPADM

## 2022-06-14 RX ORDER — BUPIVACAINE HCL/0.9 % NACL/PF 0.1 %
2 PLASTIC BAG, INJECTION (ML) EPIDURAL EVERY 8 HOURS
Qty: 9000 ML | Refills: 1
Start: 2022-06-14 | End: 2022-07-22

## 2022-06-14 RX ORDER — ISOSORBIDE MONONITRATE 30 MG/1
60 TABLET, EXTENDED RELEASE ORAL DAILY
Status: DISCONTINUED | OUTPATIENT
Start: 2022-06-15 | End: 2022-07-06 | Stop reason: HOSPADM

## 2022-06-14 RX ORDER — SODIUM CHLORIDE 0.9 % (FLUSH) 0.9 %
10 SYRINGE (ML) INJECTION AS NEEDED
Status: DISCONTINUED | OUTPATIENT
Start: 2022-06-14 | End: 2022-07-06 | Stop reason: HOSPADM

## 2022-06-14 RX ORDER — SODIUM CHLORIDE 0.9 % (FLUSH) 0.9 %
10 SYRINGE (ML) INJECTION EVERY 12 HOURS SCHEDULED
Status: DISCONTINUED | OUTPATIENT
Start: 2022-06-14 | End: 2022-07-06 | Stop reason: HOSPADM

## 2022-06-14 RX ORDER — SACCHAROMYCES BOULARDII 250 MG
250 CAPSULE ORAL 2 TIMES DAILY
Status: DISCONTINUED | OUTPATIENT
Start: 2022-06-14 | End: 2022-07-06 | Stop reason: HOSPADM

## 2022-06-14 RX ADMIN — POLYETHYLENE GLYCOL 3350 17 G: 17 POWDER, FOR SOLUTION ORAL at 09:12

## 2022-06-14 RX ADMIN — CARVEDILOL 25 MG: 25 TABLET, FILM COATED ORAL at 09:11

## 2022-06-14 RX ADMIN — ISOSORBIDE MONONITRATE 60 MG: 60 TABLET, EXTENDED RELEASE ORAL at 09:11

## 2022-06-14 RX ADMIN — OXYCODONE HYDROCHLORIDE AND ACETAMINOPHEN 500 MG: 500 TABLET ORAL at 09:11

## 2022-06-14 RX ADMIN — SUCRALFATE 1 G: 1 TABLET ORAL at 05:56

## 2022-06-14 RX ADMIN — CARVEDILOL 25 MG: 25 TABLET, FILM COATED ORAL at 17:51

## 2022-06-14 RX ADMIN — THIAMINE HCL TAB 100 MG 100 MG: 100 TAB at 09:11

## 2022-06-14 RX ADMIN — LABETALOL HYDROCHLORIDE 10 MG: 5 INJECTION INTRAVENOUS at 09:20

## 2022-06-14 RX ADMIN — Medication 324 MG: at 09:11

## 2022-06-14 RX ADMIN — FAMOTIDINE 40 MG: 20 TABLET, FILM COATED ORAL at 09:11

## 2022-06-14 RX ADMIN — LIDOCAINE 1 PATCH: 50 PATCH CUTANEOUS at 09:12

## 2022-06-14 RX ADMIN — ENALAPRILAT 0.62 MG: 1.25 INJECTION INTRAVENOUS at 05:48

## 2022-06-14 RX ADMIN — DOCUSATE SODIUM 50 MG AND SENNOSIDES 8.6 MG 2 TABLET: 8.6; 5 TABLET, FILM COATED ORAL at 09:11

## 2022-06-14 RX ADMIN — ASPIRIN 81 MG: 81 TABLET, COATED ORAL at 09:11

## 2022-06-14 RX ADMIN — Medication 250 MG: at 09:11

## 2022-06-14 RX ADMIN — ENALAPRILAT 0.62 MG: 1.25 INJECTION INTRAVENOUS at 17:50

## 2022-06-14 RX ADMIN — METHYLNALTREXONE BROMIDE 12 MG: 12 INJECTION, SOLUTION SUBCUTANEOUS at 09:12

## 2022-06-14 RX ADMIN — FLUTICASONE PROPIONATE 1 SPRAY: 50 SPRAY, METERED NASAL at 09:13

## 2022-06-14 RX ADMIN — ENOXAPARIN SODIUM 40 MG: 100 INJECTION SUBCUTANEOUS at 11:31

## 2022-06-14 RX ADMIN — CEFAZOLIN 2 G: 10 INJECTION, POWDER, FOR SOLUTION INTRAVENOUS at 13:53

## 2022-06-14 RX ADMIN — LABETALOL HYDROCHLORIDE 10 MG: 5 INJECTION INTRAVENOUS at 04:21

## 2022-06-14 RX ADMIN — DOCUSATE SODIUM 100 MG: 100 CAPSULE, LIQUID FILLED ORAL at 09:11

## 2022-06-14 RX ADMIN — DULOXETINE HYDROCHLORIDE 60 MG: 30 CAPSULE, DELAYED RELEASE ORAL at 09:11

## 2022-06-14 RX ADMIN — Medication 1 TABLET: at 09:11

## 2022-06-14 RX ADMIN — LEVOTHYROXINE SODIUM 75 MCG: 75 TABLET ORAL at 05:56

## 2022-06-14 RX ADMIN — Medication 10 ML: at 09:13

## 2022-06-14 RX ADMIN — SUCRALFATE 1 G: 1 TABLET ORAL at 11:31

## 2022-06-14 RX ADMIN — MAGNESIUM GLUCONATE 500 MG ORAL TABLET 400 MG: 500 TABLET ORAL at 09:11

## 2022-06-14 RX ADMIN — LIDOCAINE HYDROCHLORIDE 5 ML: 10 INJECTION, SOLUTION INFILTRATION; PERINEURAL at 10:01

## 2022-06-14 RX ADMIN — CEFAZOLIN 2 G: 10 INJECTION, POWDER, FOR SOLUTION INTRAVENOUS at 05:56

## 2022-06-14 RX ADMIN — COLLAGENASE SANTYL 1 APPLICATION: 250 OINTMENT TOPICAL at 09:11

## 2022-06-14 RX ADMIN — FUROSEMIDE 40 MG: 40 TABLET ORAL at 09:11

## 2022-06-14 RX ADMIN — ENALAPRILAT 0.62 MG: 1.25 INJECTION INTRAVENOUS at 11:31

## 2022-06-14 NOTE — THERAPY EVALUATION
Acute Care - Speech Language Pathology Initial Evaluation  [facility]     Patient Name: Tawny Shin  : 1955  MRN: [id number]  Today's Date: 2022               Admit Date: 6/3/2022     RBANS: The Repeatable Battery for the Assessment of Neuropsychological Status (RBANS) assesses patient function in the areas of Immediate and Delayed memory, visuospatial/constructional skills, language and attention. It is used to detect and track neurocognitive deficits.   Index score Percentile Qualitative Description   Immediate Memory 44  Extremely Low   Visuospatial 78  Borderline   Language 82  Low Average   Attention 75  Borderline   Delayed Memory 44  Extremely Low   Total Scale 54  Extremely Low   Comments:   RBANS completed.  Patient had difficulty with all STM and delayed memory tasks.  She states she has had Memory difficulty for some time.  Able to recall only a few details from information given and list presented.  SLP will continue to follow for STM deficits.  Miranda Anderson, MS CCC-SLP 2022 16:03 CDT      Visit Dx:    ICD-10-CM ICD-9-CM   1. Abscess of right hip  L02.415 682.6   2. Impaired mobility  Z74.09 799.89   3. Abscess of hip, right  L02.415 682.6   4. Other dysphagia  R13.19 787.29   5. Short-term memory loss  R41.3 780.93     Patient Active Problem List   Diagnosis   • T12 compression fracture, initial encounter ([facility])   • Chronic embolism and thrombosis of unspecified deep veins of left lower extremity ([facility])   • Urinary tract infection without hematuria   • Acute bilateral thoracic back pain   • Chronic anticoagulation   • Hypokalemia   • Transaminitis   • Iron deficiency anemia   • Osteoporosis   • Bacteremia   • Epidural hematoma ([facility])   • Pleural effusion, left   • Functional neurological symptom disorder with weakness or paralysis   • Overweight (BMI 25.0-29.9)   • Rheumatoid arthritis involving multiple sites ([facility])   • (HFpEF) heart failure with preserved ejection fraction ([facility]),  acute on chronic   • Venous insufficiency   • Coronary artery disease involving native coronary artery of native heart without angina pectoris   • Sick sinus syndrome ([facility])   • Essential hypertension   • Pressure injury of skin of heel   • Abscess, right hip   • Chronic pain   • Chronic indwelling Horan catheter   • Abscess of right hip   • Anemia of chronic disease   • Cholelithiasis   • Encephalopathy, toxic     Past Medical History:   Diagnosis Date   • Age-related osteoporosis with current pathological fracture 5/27/2020   • Arthritis    • Asthma    • Bilateral bunions 12/23/2020   • Cancer ([facility])    • Cardiac pacemaker syndrome 12/23/2020    Overview:  - heart block - implanted 11/16   • Charcot's joint of foot, left 12/23/2020   • Chronic deep vein thrombosis (DVT) of right lower extremity ([facility]) 6/23/2021   • Chronic pain syndrome 6/22/2021   • Chronic sinusitis    • COPD (chronic obstructive pulmonary disease) ([facility])    • Coronary artery disease    • 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   • Elevated cholesterol    • Eustachian tube dysfunction    • Heart disease    • Herpes simplex    • History of transfusion    • Hyperlipidemia    • Hypertension    • Hypothyroidism 12/23/2020   • Intrinsic asthma 12/23/2020   • Knee dislocation    • Labral tear of right hip joint    • Laryngitis sicca    • Laryngitis, chronic    • Left carotid bruit 3/9/2016   • MI (myocardial infarction) ([facility])    • Myalgia due to statin 6/25/2019   • Open wound of right hip 9/14/2021   • Osteomyelitis of right femur ([facility]) 7/6/2021   • Otorrhea    • Pacemaker 11/17/2016   • Primary osteoarthritis of left knee 12/23/2020   • Psoriasis vulgaris 12/23/2020   • S/P coronary artery stent placement 3/9/2016   • Sensorineural hearing loss    • Seropositive rheumatoid arthritis of multiple sites ([facility]) 12/27/2019    Overview:  -myochrysine '93-'96 -methotrexate '96--->11/98;r/s  restarted 2/99--> 8/14 (anemia) -sulfasalazine- not effective  "-penicillamine 6/98-->10/98; no effect -leflunomide 11/98--> - Humira '13-->didn't take - Enbrel 12/14-->3/15- no effect!   Last Assessment & Plan:  - \"aching all over\" because she had to be off her anti-rheumatic drugs for 2 weeks in preparation for her R knee surgery - he   • Sick sinus syndrome (HCC) 12/27/2019   • Sjogren's disease (HCC)    • Spondylolisthesis of lumbar region 1/17/2018   • Syncope, recurrent 2/8/2021     Past Surgical History:   Procedure Laterality Date   • A-V CARDIAC PACEMAKER INSERTION  2016   • ATRIAL CARDIAC PACEMAKER INSERTION     • CARDIAC CATHETERIZATION     • CATARACT EXTRACTION     • CERVICAL CORPECTOMY N/A 3/3/2021    Procedure: CERVICAL 6 CORPECTOMY WITH TITANIUM CAGE WITH NEURO MONITORING;  Surgeon: Bandar Shea MD;  Location:  [facility] OR;  Service: Neurosurgery;  Laterality: N/A;   • COLONOSCOPY  11/08/2011    One fold in the ascending colon which showed ulcer otherwise normal exam   • COLONOSCOPY  11/12/2004    Normal exam repeat in five years   • CORONARY ANGIOPLASTY WITH STENT PLACEMENT      X 2; 2013 & 2014   • ENDOSCOPY  07/10/2014    Normal exam   • FLAP LEG Right 9/14/2021    Procedure: RIGHT GLUTEAL FASCIOCUTANEOUS ADVANCEMENT FLAP AND RIGHT TENSOR FASCIAL JESSICA FLAP;  Surgeon: Amadeo Turner MD;  Location:  [facility];  Service: Plastics;  Laterality: Right;   • HIP ABDUCTION TENOTOMY BILATERAL Right 1/14/2021    Procedure: RIGHT HIP GLUTEUS MEDLUS / MINIMUS REPAIR, POSSIBLE ACHILLES ALLOGRAFT;  Surgeon: Nino Carlson MD;  Location:  [facility];  Service: Orthopedics;  Laterality: Right;   • INCISION AND DRAINAGE ABSCESS Right 6/4/2022    Procedure: INCISION AND DRAINAGE ABSCESS right hip;  Surgeon: Magda Salcido MD;  Location:  [facility];  Service: General;  Laterality: Right;   • INCISION AND DRAINAGE ABSCESS Right 6/10/2022    Procedure: RIGHT HIP INCISION AND DRAINAGE. MD NEEDS 3L VANC IRRIGATION, CURRETTES, DAICANS, KERLEX ROLLS;  Surgeon: Amadeo Turner" MD BEBO;  Location:  [facility];  Service: Plastics;  Laterality: Right;   • INCISION AND DRAINAGE HIP Right 2/9/2021    Procedure: HIP INCISION AND DRAINAGE;  Surgeon: Nino Carlson MD;  Location:  [facility];  Service: Orthopedics;  Laterality: Right;   • INCISION AND DRAINAGE LEG Right 10/24/2021    Procedure: INCISION AND DRAINAGE LOWER EXTREMITY;  Surgeon: Amadeo Turner MD;  Location:  [facility];  Service: Plastics;  Laterality: Right;   • INCISION AND DRAINAGE OF WOUND Right 7/8/2021    Procedure: INCISION AND DRAINAGE WOUND RIGHT HIP;  Surgeon: Jaems Huntley MD;  Location:  [facility];  Service: Orthopedics;  Laterality: Right;   • JOINT REPLACEMENT     • KYPHOPLASTY WITH BIOPSY Bilateral 10/26/2021    Procedure: THOARCIC 12 KYPHOPLASTY WITH BIOPSY;  Surgeon: Bandar Shea MD;  Location:  [facility];  Service: Neurosurgery;  Laterality: Bilateral;   • LEG DEBRIDEMENT Right 9/14/2021    Procedure: DEBRIDEMENT OF RIGHT HIP WOUND, RIGHT GLUTEAL FASCIOCUTANEOUS ADVANCEMENT FLAP AND RIGHT TENSOR FASCIAL JESSICA FLAP;  Surgeon: Amadeo Turner MD;  Location:  [facility];  Service: Plastics;  Laterality: Right;   • LUMBAR DISCECTOMY Right 3/23/2021    Procedure: LUMBAR DISCECTOMY MICRO, Lumbar 1/2 right;  Surgeon: Bandar Shea MD;  Location:  [facility];  Service: Neurosurgery;  Laterality: Right;   • LUMBAR FUSION N/A 1/19/2018    Procedure: L3-4,L4-5 DECOMPRESSION, POSTERIOR SPINAL FUSION WITH INSTRUMENTATION;  Surgeon: Fortino Oropeza MD;  Location: [facility] OR;  Service:    • LUMBAR LAMINECTOMY WITH FUSION Left 1/17/2018    Procedure: LEFT L3-4 L4-5 LATERAL LUMBAR INTERBODY FUSION;  Surgeon: Fortino Oropeza MD;  Location:  [facility];  Service:    • MYRINGOTOMY W/ TUBES  09/04/2014    TUBES NO LONGER IN PLACE   • OTHER SURGICAL HISTORY      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 so hardware was removed and spacers were placed   • REPLACEMENT TOTAL KNEE Right        SLP Recommendation and Plan  SLP  Diagnosis: STM deficits (06/14/22 1445)        [facility] [place] for Skilled Therapy Interventions Met: yes (06/14/22 1445)  Anticipated Discharge Disposition (SLP): unknown (06/14/22 1445)        Predicted Duration Therapy Intervention (Days): until discharge (06/14/22 1445)                    Plan of Care Reviewed With: patient (06/14/22 1557)  Progress: no change (06/14/22 1557)      SLP EVALUATION (last 72 hours)     SLP SLC Evaluation     Row Name 06/14/22 1445                   Communication Assessment/Intervention    Document Type evaluation  -KW        Subjective Information no complaints  -KW        Patient Observations alert;cooperative  -KW        Patient/Family/Caregiver Comments/Observations no family present  -KW        Patient Effort good  -KW                  General Information    Patient Profile Reviewed yes  -KW        Pertinent History Of Current Problem Primary problem: R hip surgical site drainage s/p I&D.  -KW        Precautions/Limitations, Vision WFL;for purposes of eval  -KW        Precautions/Limitations, Hearing hearing impairment, bilaterally  -KW        Prior Level of Function-Communication cognitive-linguistic impairment  -KW        Plans/Goals Discussed with patient  -KW        Barriers to Rehab previous functional deficit  -KW        Patient's Goals for Discharge return to all previous roles/activities  -KW        Standardized Assessment Used RBANS  -KW                  Pain    Additional Documentation Pain Scale: FACES Pre/Post-Treatment (Group)  -KW                  Pain Scale: FACES Pre/Post-Treatment    Pain: FACES Scale, Pretreatment 0-->no hurt  -KW        Posttreatment Pain Rating 0-->no hurt  -KW                  Comprehension Assessment/Intervention    Comprehension Assessment/Intervention Auditory Comprehension  -KW                  Auditory Comprehension Assessment/Intervention    Auditory Comprehension (Communication) WFL  -KW        Narrative Discourse WFL  -KW                   Expression Assessment/Intervention    Expression Assessment/Intervention verbal expression  -KW                  Verbal Expression Assessment/Intervention    Verbal Expression WFL  -KW        Conversational Discourse/Fluency WFL  -KW                  Oral Motor Structure and Function    Oral Motor Structure and Function WFL  -KW        Dentition Assessment natural, present and adequate  -KW        Mucosal Quality moist, healthy  -KW                  Oral Musculature and Cranial Nerve Assessment    Oral Motor General Assessment generalized oral motor weakness  -KW                  Motor Speech Assessment/Intervention    Motor Speech Function WFL  -KW                  Standardized Tests    Cognitive/Memory Tests RBANS: Repeatable Battery for the Assessment of Neuropsychological Status  -KW                  RBANS- Repeatable Battery for the Assessment of Neuropsychological Status    Immediate Memory Index Score 44  -KW        Immediate Memory Qualitative Description extremely low  -KW        Visuospatial Index Score 78  -KW        Visuospatial Qualitative Description borderline  -KW        Language Index Score 80  -KW        Language Qualitative Description low average  -KW        Attention Index Score 75  -KW        Attention Qualitative Description borderline  -KW        Delayed Memory Index Score 44  -KW        Delayed Memory Qualitative Description extremely low  -KW        Total Index Score 321  -KW        Total Qualitative Description extremely low  -KW                  SLP Evaluation Clinical Impressions    SLP Diagnosis STM deficits  -KW        Rehab Potential/Prognosis fair  -KW        SLC Criteria for Skilled Therapy Interventions Met yes  -KW        Functional Impact functional impact in social situations;functional impact in ADLs  -KW                  Recommendations    Therapy Frequency (SLP SLC) at least;3 days per week  -KW        Predicted Duration Therapy Intervention (Days) until discharge  -KW         Anticipated Discharge Disposition (SLP) unknown  -KW                  Communication Treatment Objective and Progress Goals (SLP)    Cognitive Linguistic Treatment Objectives Cognitive Linguistic Treatment Objectives (Group)  -KW                  Cognitive Linguistic Treatment Objectives    Memory Skills Selection memory skills, SLP goal 1  -KW                  Memory Skills Goal 1 (SLP)    Improve Memory Skills Through Goal 1 (SLP) select a word from a list by exclusion;repeat list in sequential order;visual memory task;use memory strategies;80%  -KW        Time Frame (Memory Skills Goal 1, SLP) short term goal (STG);by discharge  -KW        Barriers (Memory Skills Goal 1, SLP) cognitive  -KW        Progress/Outcomes (Memory Skills Goal 1, SLP) goal ongoing  -KW              User Key  (r) = Recorded By, (t) = Taken By, (c) = Cosigned By    Initials Name Effective Dates    Miranda Pozo, MS CCC-SLP 05/31/22 -                    EDUCATION  The patient has been educated in the following areas:     Cognitive Impairment.           SLP GOALS     Row Name 06/14/22 1445 06/12/22 0721          Oral Nutrition/Hydration Goal 1 (SLP)    Oral Nutrition/Hydration Goal 1, SLP -- LTG: Patient will tolerate LRD without s/s of aspiration.  -MM     Time Frame (Oral Nutrition/Hydration Goal 1, SLP) -- by discharge  -MM     Barriers (Oral Nutrition/Hydration Goal 1, SLP) -- cognition  -MM     Progress/Outcomes (Oral Nutrition/Hydration Goal 1, SLP) -- continuing progress toward goal  -MM            Memory Skills Goal 1 (SLP)    Improve Memory Skills Through Goal 1 (SLP) select a word from a list by exclusion;repeat list in sequential order;visual memory task;use memory strategies;80%  -KW --     Time Frame (Memory Skills Goal 1, SLP) short term goal (STG);by discharge  -KW --     Barriers (Memory Skills Goal 1, SLP) cognitive  -KW --     Progress/Outcomes (Memory Skills Goal 1, SLP) goal ongoing  -KW --           User Key  (r) =  Recorded By, (t) = Taken By, (c) = Cosigned By    Initials Name Provider Type    Miranda Pozo MS CCC-SLP Speech and Language Pathologist    MM Nicole Acuña MS CCC-SLP Speech and Language Pathologist                        Time Calculation:      Time Calculation- SLP     Row Name 06/14/22 1601             Time Calculation- SLP    SLP Start Time 1445  -KW      SLP Stop Time 1601  -KW      SLP Time Calculation (min) 76 min  -KW      SLP Received On 06/14/22  -KW              Timed Charges    96125-Standardized cognitive performance testing 76  -KW              Total Minutes    Timed Charges Total Minutes 76  -KW       Total Minutes 76  -KW            User Key  (r) = Recorded By, (t) = Taken By, (c) = Cosigned By    Initials Name Provider Type    Miranda Pozo MS CCC-SLP Speech and Language Pathologist                Therapy Charges for Today     Code Description Service Date Service Provider Modifiers Qty    [id number] HC ST STD COG PERF TEST PER HOUR 6/14/2022 Miranda Anderson MS CCC-SLP GN 2                     Miranda Anderson MS CCC-GT  6/14/2022

## 2022-06-14 NOTE — PROGRESS NOTES
"INFECTIOUS DISEASES PROGRESS NOTE    Patient:  Tawny Shin  YOB: 1955  MRN: [id number]   Admit date: 6/3/2022   Admitting Physician: Moises Oliveira MD  Primary Care Physician: Leta Julian DO    Chief Complaint: \"I need to get my hearing aids checked\"        Interval History: Patient noted that her TV was loud.  She said that she does wear hearing aids but she still is hard of hearing.  She said, \"I need to go to Dr. Banks and have them checked out\"      Allergies:   Allergies   Allergen Reactions   • Atorvastatin Other (See Comments)     LEG CRAMPS     • Amoxicillin Rash   • Escitalopram Rash   • Nabumetone Rash   • Niacin Er Rash   • Penicillin G Rash   • Penicillins Rash   • Simvastatin Rash       Current Scheduled Medications:   ascorbic acid, 500 mg, Oral, Daily  aspirin, 81 mg, Oral, Daily  bisacodyl, 10 mg, Rectal, Daily  carvedilol, 25 mg, Oral, BID With Meals  ceFAZolin, 2 g, Intravenous, Q8H  collagenase, 1 application, Topical, Q24H  docusate sodium, 100 mg, Oral, BID  DULoxetine, 60 mg, Oral, Daily  enalaprilat, 0.625 mg, Intravenous, Q6H  enoxaparin, 40 mg, Subcutaneous, Q12H  famotidine, 40 mg, Oral, Daily  ferrous gluconate, 324 mg, Oral, Daily With Breakfast  fluticasone, 1 spray, Nasal, Daily  furosemide, 40 mg, Oral, Daily  isosorbide mononitrate, 60 mg, Oral, Daily  levothyroxine, 75 mcg, Oral, Q AM  lidocaine, 1 patch, Transdermal, Q24H  magnesium citrate, 150 mL, Oral, Once  magnesium oxide, 400 mg, Oral, Daily  methylnaltrexone, 12 mg, Subcutaneous, Every Other Day  multivitamin with minerals, 1 tablet, Oral, Daily  polyethylene glycol, 17 g, Oral, Daily  saccharomyces boulardii, 250 mg, Oral, Daily  sennosides-docusate, 2 tablet, Oral, BID  sodium chloride, 10 mL, Intravenous, Q12H  sucralfate, 1 g, Oral, 4x Daily AC & at Bedtime  thiamine, 100 mg, Oral, Daily      Current PRN Medications:  •  acetaminophen  •  Diclofenac Sodium  •  droperidol  •  " "HYDROcodone-acetaminophen  •  hydrocortisone-bacitracin-zinc oxide-nystatin  •  labetalol  •  [MAR Hold] magnesium hydroxide  •  nitroglycerin  •  ondansetron **OR** ondansetron  •  [COMPLETED] Insert peripheral IV **AND** sodium chloride  •  sodium chloride  •  traMADol    Review of Systems   Constitutional: Negative for fever.   Skin: Positive for wound.       Objective     Vital Signs:  Temp (24hrs), Av °F (36.7 °C), Min:97.7 °F (36.5 °C), Max:98.1 °F (36.7 °C)      /55 (BP Location: Right arm, Patient Position: Lying)   Pulse 61   Temp 98.1 °F (36.7 °C) (Oral)   Resp 16   Ht 167.6 cm (66\")   Wt 81.1 kg (178 lb 12.7 oz)   LMP  (LMP Unknown)   SpO2 96%   BMI 28.86 kg/m²         Physical Exam  General: Patient was easily awakened from sleep.  She was lying in bed  HEENT: Sclera anicteric and noninjected  Respiratory: Effort even and unlabored, she is not conversationally dyspneic  Right hip dressing with ABD pad over wound and packing but not secured well.  This was secured with her brief.  Neuro: Easily awakened from sleep, speech clear  Psych: Pleasant cooperative    Left upper extremity PICC line with some minimal evidence of bleeding around the insertion site.  Dressing is intact        Results Review:    I reviewed the patient's new clinical results.    Lab Results:  CBC:   Lab Results   Lab 22  0700 06/10/22  0929 22  0613 22  0755 22  0750   WBC 4.39 5.57 5.75 4.07 4.40   HEMOGLOBIN 8.4* 8.9* 8.4* 8.4* 8.0*   HEMATOCRIT 27.8* 29.4* 27.9* 27.7* 26.3*   PLATELETS 225 201 205 179 171         CMP:   Lab Results   Lab 22  0613 22  0755 22  0750   SODIUM 138 140 138   POTASSIUM 3.6 3.5 4.0   CHLORIDE 103 107 107   CO2 26.0 27.0 26.0   BUN 13 13 9   CREATININE 0.76 0.71 0.62   CALCIUM 8.5* 8.5* 8.3*   BILIRUBIN 0.3 0.2 0.2   ALK PHOS 84 84 92   ALT (SGPT) 6 6 6   AST (SGOT) 13 14 23   GLUCOSE 83 93 102*       Estimated Creatinine Clearance: 93.2 mL/min (by " C-G formula based on SCr of 0.62 mg/dL).    Culture Results:    Microbiology Results (last 10 days)     Procedure Component Value - Date/Time    Tissue / Bone Culture - Bone, Hip, Right [[id number]] Collected: 06/10/22 1607    Lab Status: Final result Specimen: Bone from Hip, Right Updated: 06/13/22 0938     Tissue Culture No growth at 3 days     Gram Stain Rare (1+) WBCs per low power field      No organisms seen    AFB Culture - Bone, Hip, Right [[id number]] Collected: 06/10/22 1607    Lab Status: Preliminary result Specimen: Bone from Hip, Right Updated: 06/12/22 1147     AFB Stain No acid fast bacilli seen on direct smear      No acid fast bacilli seen on concentrated smear    COVID PRE-OP / PRE-PROCEDURE SCREENING ORDER (NO ISOLATION) - Swab, Nasal Cavity [[id number]]  (Normal) Collected: 06/10/22 1442    Lab Status: Final result Specimen: Swab from Nasal Cavity Updated: 06/10/22 1534    Narrative:      The following orders were created for panel order COVID PRE-OP / PRE-PROCEDURE SCREENING ORDER (NO ISOLATION) - Swab, Nasal Cavity.  Procedure                               Abnormality         Status                     ---------                               -----------         ------                     COVID-19,[facility] IN-FLAK[facility]...[[id number]]  Normal              Final result                 Please view results for these tests on the individual orders.    COVID-19,[facility] IN-HOUSE,Nasal Swab No Transport Media 3-4 HR TAT - Swab, Nasal Cavity [[id number]]  (Normal) Collected: 06/10/22 1442    Lab Status: Final result Specimen: Swab from Nasal Cavity Updated: 06/10/22 1534     COVID19 Not Detected    Narrative:      Fact sheet for providers: https://www.fda.gov/media/342611/download     Fact sheet for patients: https://www.fda.gov/media/965198/download    Test performed by PCR.    Consider negative results in combination with clinical observations, patient history, and epidemiological information.  Fact sheet for  providers: https://www.fda.gov/media/839582/download     Fact sheet for patients: https://www.fda.gov/media/843029/download    Test performed by PCR.    Consider negative results in combination with clinical observations, patient history, and epidemiological information.               Radiology:   Imaging Results (Last 72 Hours)     ** No results found for the last 72 hours. **          Assessment & Plan     Active Hospital Problems    Diagnosis    • **Abscess, right hip    • Encephalopathy, toxic    • Cholelithiasis    • Anemia of chronic disease    • Pressure injury of skin of heel    • Chronic pain    • Chronic indwelling Horan catheter    • Abscess of right hip      Added automatically from request for surgery [id number]     • Essential hypertension    • Rheumatoid arthritis involving multiple sites (HCC)    • Functional neurological symptom disorder with weakness or paralysis    • Chronic anticoagulation    • Chronic embolism and thrombosis of unspecified deep veins of left lower extremity (HCC)        IMPRESSION:  1. Methicillin susceptible Staphylococcus aureus infection right hip.  Possible osteomyelitis.  Bone culture from right hip is negative however patient had previously been on antibiotics and has concerning findings on MRI.  Additionally wound cultures from June 4 all positive for methicillin susceptible Staph aureus  2. Urinary tract infection with Pseudomonas aeruginosa and E. coli- completed treatment with cefepime  3. Encephalopathy-improved  4. Rheumatoid arthritis      RECOMMENDATION:   · Continue cefazolin  · At this point would plan on approximately 4 weeks IV cefazolin, possibly 6 weeks and then transition to oral therapy        Maggie Bang MD  06/14/22  16:24 CDT

## 2022-06-14 NOTE — PLAN OF CARE
Goal Outcome Evaluation:  Plan of Care Reviewed With: patient        Progress: improving  Outcome Evaluation: PT tx completed. Pt alert and oriented. Feels better today she reports. Minimal pn RLE/hip region. Rolling L<>R Mod, sup<>sit Mod/MaxA x 2, static sitting balance improved to CG. Performed sliding board t-marvin to w/c MaxA of 2. Pt eager to sit up. Worked on w/c mobility in room. Recommend SNF.

## 2022-06-14 NOTE — PLAN OF CARE
Goal Outcome Evaluation:  Plan of Care Reviewed With: patient        Progress: no change     RBANS: The Repeatable Battery for the Assessment of Neuropsychological Status (RBANS) assesses patient function in the areas of Immediate and Delayed memory, visuospatial/constructional skills, language and attention. It is used to detect and track neurocognitive deficits.   Index score Percentile Qualitative Description   Immediate Memory 44  Extremely Low   Visuospatial 78  Borderline   Language 82  Low Average   Attention 75  Borderline   Delayed Memory 44  Extremely Low   Total Scale 54  Extremely Low   Comments:   RBANS completed.  Patient had difficulty with all STM and delayed memory tasks.  She states she has had Memory difficulty for some time.  Able to recall only a few details from information given and list presented.  SLP will continue to follow for STM deficits.

## 2022-06-14 NOTE — PROGRESS NOTES
[facility]  INPATIENT WOUND CARE    PROGRESS NOTE    Today's Date: 06/14/22    Patient Name: Tawny Shin  MRN: [id number]  [facility]: [id number]  PCP: Leta Julian DO  Referring Provider: PASCUAL ADAMS  Attending Provider: Moises Oliveira MD  Length of Stay: 10    SUBJECTIVE   Chief Complaint: Wounds of lower extremities    HPI: Tawny Shin presents sitting up in chair at the bedside with bedside table in front of her.  She has a visitor in the room.  Patient is alert and oriented x4. Confusion has subsided since last seen by wound care.  RN state she is doing much better.  Lower extremities are noted to have increased edema today.      Visit Dx:    ICD-10-CM ICD-9-CM   1. Abscess of right hip  L02.415 682.6   2. Impaired mobility  Z74.09 799.89   3. Abscess of hip, right  L02.415 682.6   4. Other dysphagia  R13.19 787.29     Patient Active Problem List   Diagnosis   • T12 compression fracture, initial encounter (HCC)   • Chronic embolism and thrombosis of unspecified deep veins of left lower extremity (HCC)   • Urinary tract infection without hematuria   • Acute bilateral thoracic back pain   • Chronic anticoagulation   • Hypokalemia   • Transaminitis   • Iron deficiency anemia   • Osteoporosis   • Bacteremia   • Epidural hematoma (HCC)   • Pleural effusion, left   • Functional neurological symptom disorder with weakness or paralysis   • Overweight (BMI 25.0-29.9)   • Rheumatoid arthritis involving multiple sites (HCC)   • (HFpEF) heart failure with preserved ejection fraction (HCC), acute on chronic   • Venous insufficiency   • Coronary artery disease involving native coronary artery of native heart without angina pectoris   • Sick sinus syndrome (HCC)   • Essential hypertension   • Pressure injury of skin of heel   • Abscess, right hip   • Chronic pain   • Chronic indwelling Horan catheter   • Abscess of right hip   • Anemia of chronic disease   • Cholelithiasis   •  "Encephalopathy, toxic       History:   Past Medical History:   Diagnosis Date   • Age-related osteoporosis with current pathological fracture 5/27/2020   • Arthritis    • Asthma    • Bilateral bunions 12/23/2020   • Cancer ([facility])    • Cardiac pacemaker syndrome 12/23/2020    Overview:  - heart block - implanted 11/16   • Charcot's joint of foot, left 12/23/2020   • Chronic deep vein thrombosis (DVT) of right lower extremity ([facility]) 6/23/2021   • Chronic pain syndrome 6/22/2021   • Chronic sinusitis    • COPD (chronic obstructive pulmonary disease) ([facility])    • Coronary artery disease    • 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   • Elevated cholesterol    • Eustachian tube dysfunction    • Heart disease    • Herpes simplex    • History of transfusion    • Hyperlipidemia    • Hypertension    • Hypothyroidism 12/23/2020   • Intrinsic asthma 12/23/2020   • Knee dislocation    • Labral tear of right hip joint    • Laryngitis sicca    • Laryngitis, chronic    • Left carotid bruit 3/9/2016   • MI (myocardial infarction) ([facility])    • Myalgia due to statin 6/25/2019   • Open wound of right hip 9/14/2021   • Osteomyelitis of right femur ([facility]) 7/6/2021   • Otorrhea    • Pacemaker 11/17/2016   • Primary osteoarthritis of left knee 12/23/2020   • Psoriasis vulgaris 12/23/2020   • S/P coronary artery stent placement 3/9/2016   • Sensorineural hearing loss    • Seropositive rheumatoid arthritis of multiple sites ([facility]) 12/27/2019    Overview:  -myochrysine '93-'96 -methotrexate '96--->11/98;r/s  restarted 2/99--> 8/14 (anemia) -sulfasalazine- not effective -penicillamine 6/98-->10/98; no effect -leflunomide 11/98--> - Humira '13-->didn't take - Enbrel 12/14-->3/15- no effect!   Last Assessment & Plan:  - \"aching all over\" because she had to be off her anti-rheumatic drugs for 2 weeks in preparation for her R knee surgery - he   • Sick sinus syndrome ([facility]) 12/27/2019   • Sjogren's disease ([facility])    • Spondylolisthesis of lumbar region " 1/17/2018   • Syncope, recurrent 2/8/2021     Past Surgical History:   Procedure Laterality Date   • A-V CARDIAC PACEMAKER INSERTION  2016   • ATRIAL CARDIAC PACEMAKER INSERTION     • CARDIAC CATHETERIZATION     • CATARACT EXTRACTION     • CERVICAL CORPECTOMY N/A 3/3/2021    Procedure: CERVICAL 6 CORPECTOMY WITH TITANIUM CAGE WITH NEURO MONITORING;  Surgeon: Bandar Shea MD;  Location:  [facility] OR;  Service: Neurosurgery;  Laterality: N/A;   • COLONOSCOPY  11/08/2011    One fold in the ascending colon which showed ulcer otherwise normal exam   • COLONOSCOPY  11/12/2004    Normal exam repeat in five years   • CORONARY ANGIOPLASTY WITH STENT PLACEMENT      X 2; 2013 & 2014   • ENDOSCOPY  07/10/2014    Normal exam   • FLAP LEG Right 9/14/2021    Procedure: RIGHT GLUTEAL FASCIOCUTANEOUS ADVANCEMENT FLAP AND RIGHT TENSOR FASCIAL JESSICA FLAP;  Surgeon: Amadeo Turner MD;  Location:  [facility] OR;  Service: Plastics;  Laterality: Right;   • HIP ABDUCTION TENOTOMY BILATERAL Right 1/14/2021    Procedure: RIGHT HIP GLUTEUS MEDLUS / MINIMUS REPAIR, POSSIBLE ACHILLES ALLOGRAFT;  Surgeon: Nino Carlson MD;  Location:  [facility] OR;  Service: Orthopedics;  Laterality: Right;   • INCISION AND DRAINAGE ABSCESS Right 6/4/2022    Procedure: INCISION AND DRAINAGE ABSCESS right hip;  Surgeon: Magda Salcido MD;  Location:  [facility] OR;  Service: General;  Laterality: Right;   • INCISION AND DRAINAGE ABSCESS Right 6/10/2022    Procedure: RIGHT HIP INCISION AND DRAINAGE. MD NEEDS 3L VANC IRRIGATION, CURRETTES, DAICANS, KERLEX ROLLS;  Surgeon: Amadeo Turner MD;  Location:  [facility] OR;  Service: Plastics;  Laterality: Right;   • INCISION AND DRAINAGE HIP Right 2/9/2021    Procedure: HIP INCISION AND DRAINAGE;  Surgeon: Nino Carlson MD;  Location:  [facility] OR;  Service: Orthopedics;  Laterality: Right;   • INCISION AND DRAINAGE LEG Right 10/24/2021    Procedure: INCISION AND DRAINAGE LOWER EXTREMITY;  Surgeon: Amadeo Turner MD;   Location:  [facility] OR;  Service: Plastics;  Laterality: Right;   • INCISION AND DRAINAGE OF WOUND Right 7/8/2021    Procedure: INCISION AND DRAINAGE WOUND RIGHT HIP;  Surgeon: James Huntley MD;  Location:  [facility] OR;  Service: Orthopedics;  Laterality: Right;   • JOINT REPLACEMENT     • KYPHOPLASTY WITH BIOPSY Bilateral 10/26/2021    Procedure: THOARCIC 12 KYPHOPLASTY WITH BIOPSY;  Surgeon: Bandar Shea MD;  Location:  [facility] OR;  Service: Neurosurgery;  Laterality: Bilateral;   • LEG DEBRIDEMENT Right 9/14/2021    Procedure: DEBRIDEMENT OF RIGHT HIP WOUND, RIGHT GLUTEAL FASCIOCUTANEOUS ADVANCEMENT FLAP AND RIGHT TENSOR FASCIAL JESSICA FLAP;  Surgeon: Amadeo Turner MD;  Location:  [facility] OR;  Service: Plastics;  Laterality: Right;   • LUMBAR DISCECTOMY Right 3/23/2021    Procedure: LUMBAR DISCECTOMY MICRO, Lumbar 1/2 right;  Surgeon: Bandar Shea MD;  Location:  [facility] OR;  Service: Neurosurgery;  Laterality: Right;   • LUMBAR FUSION N/A 1/19/2018    Procedure: L3-4,L4-5 DECOMPRESSION, POSTERIOR SPINAL FUSION WITH INSTRUMENTATION;  Surgeon: Fortino Oropeza MD;  Location: [facility] OR;  Service:    • LUMBAR LAMINECTOMY WITH FUSION Left 1/17/2018    Procedure: LEFT L3-4 L4-5 LATERAL LUMBAR INTERBODY FUSION;  Surgeon: Fortino Oropeza MD;  Location: [facility] OR;  Service:    • MYRINGOTOMY W/ TUBES  09/04/2014    TUBES NO LONGER IN PLACE   • OTHER SURGICAL HISTORY      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 so hardware was removed and spacers were placed   • REPLACEMENT TOTAL KNEE Right      Social History     Socioeconomic History   • Marital status:    Tobacco Use   • Smoking status: Never Smoker   • Smokeless tobacco: Never Used   Vaping Use   • Vaping Use: Never used   Substance and Sexual Activity   • Alcohol use: No   • Drug use: Never   • Sexual activity: Defer       Allergies:  Allergies   Allergen Reactions   • Atorvastatin Other (See Comments)     LEG CRAMPS     • Amoxicillin Rash    • Escitalopram Rash   • Nabumetone Rash   • Niacin Er Rash   • Penicillin G Rash   • Penicillins Rash   • Simvastatin Rash       Medications:    Current Facility-Administered Medications:   •  acetaminophen (TYLENOL) tablet 650 mg, 650 mg, Oral, Q6H PRN, Amadeo Turner MD, 650 mg at 06/09/22 0341  •  ascorbic acid (VITAMIN C) tablet 500 mg, 500 mg, Oral, Daily, Amadeo Turner MD, 500 mg at 06/14/22 0911  •  aspirin EC tablet 81 mg, 81 mg, Oral, Daily, Amadeo Turner MD, 81 mg at 06/14/22 0911  •  bisacodyl (DULCOLAX) suppository 10 mg, 10 mg, Rectal, Daily, Amadeo Turner MD, 10 mg at 06/12/22 0920  •  carvedilol (COREG) tablet 25 mg, 25 mg, Oral, BID With Meals, Amadeo Turner MD, 25 mg at 06/14/22 0911  •  ceFAZolin in 0.9% normal saline (ANCEF) IVPB solution 2 g, 2 g, Intravenous, Q8H, Maggie Bang MD, Last Rate: 200 mL/hr at 06/14/22 0556, 2 g at 06/14/22 0556  •  collagenase ointment 1 application, 1 application, Topical, Q24H, Amadeo Turner MD, 1 application at 06/14/22 0911  •  dextrose 5 % and sodium chloride 0.9 % with KCl 20 mEq/L infusion, 75 mL/hr, Intravenous, Continuous, Butch Chong MD, Last Rate: 75 mL/hr at 06/13/22 0605, 75 mL/hr at 06/13/22 0605  •  Diclofenac Sodium (VOLTAREN) 1 % gel 4 g, 4 g, Topical, 4x Daily PRN, Amadeo Turner MD  •  docusate sodium (COLACE) capsule 100 mg, 100 mg, Oral, BID, Amadeo Turner MD, 100 mg at 06/14/22 0911  •  droperidol (INAPSINE) injection 1.25 mg, 1.25 mg, Intravenous, Q6H PRN, Amadeo Turner MD, 1.25 mg at 06/06/22 1718  •  DULoxetine (CYMBALTA) DR capsule 60 mg, 60 mg, Oral, Daily, Amadeo Turner MD, 60 mg at 06/14/22 0911  •  enalaprilat (VASOTEC) injection 0.625 mg, 0.625 mg, Intravenous, Q6H, Butch Chong MD, 0.625 mg at 06/14/22 1131  •  Enoxaparin Sodium (LOVENOX) syringe 40 mg, 40 mg, Subcutaneous, Q12H, Butch Chong MD, 40 mg at 06/14/22 1131  •  famotidine (PEPCID) tablet  40 mg, 40 mg, Oral, Daily, Amadeo Turner MD, 40 mg at 06/14/22 0911  •  ferrous gluconate tablet 324 mg, 324 mg, Oral, Daily With Breakfast, Amadeo Turner MD, 324 mg at 06/14/22 0911  •  fluticasone (FLONASE) 50 MCG/ACT nasal spray 1 spray, 1 spray, Nasal, Daily, Amadeo Turner MD, 1 spray at 06/14/22 0913  •  furosemide (LASIX) tablet 40 mg, 40 mg, Oral, Daily, Amadeo Turner MD, 40 mg at 06/14/22 0911  •  HYDROcodone-acetaminophen (NORCO) 7.5-325 MG per tablet 1 tablet, 1 tablet, Oral, Q8H PRN, Amadeo Turner MD, 1 tablet at 06/13/22 2229  •  hydrocortisone-bacitracin-zinc oxide-nystatin (MAGIC BARRIER) ointment 1 application, 1 application, Topical, PRN, Amadeo Turner MD  •  isosorbide mononitrate (IMDUR) 24 hr tablet 60 mg, 60 mg, Oral, Daily, Amadeo Turner MD, 60 mg at 06/14/22 0911  •  labetalol (NORMODYNE,TRANDATE) injection 10 mg, 10 mg, Intravenous, Q4H PRN, Amadeo Turner MD, 10 mg at 06/14/22 0920  •  levothyroxine (SYNTHROID, LEVOTHROID) tablet 75 mcg, 75 mcg, Oral, Q AM, Amadeo Turner MD, 75 mcg at 06/14/22 0556  •  lidocaine (LIDODERM) 5 % 1 patch, 1 patch, Transdermal, Q24H, Amadeo Turner MD, 1 patch at 06/14/22 0912  •  magnesium citrate solution 150 mL, 150 mL, Oral, Once, Amadeo Turner MD  •  [MAR Hold] magnesium hydroxide (MILK OF MAGNESIA) suspension 10 mL, 10 mL, Oral, Daily PRN, Magda Salcido MD, 10 mL at 06/06/22 1741  •  magnesium oxide (MAG-OX) tablet 400 mg, 400 mg, Oral, Daily, Amadeo Turner MD, 400 mg at 06/14/22 0911  •  methylnaltrexone (RELISTOR) injection 12 mg, 12 mg, Subcutaneous, Every Other Day, Amadeo Turner MD, 12 mg at 06/14/22 0912  •  multivitamin with minerals 1 tablet, 1 tablet, Oral, Daily, Amadeo Turner MD, 1 tablet at 06/14/22 0911  •  nitroglycerin (NITROSTAT) SL tablet 0.4 mg, 0.4 mg, Sublingual, Q5 Min PRN, Amadeo Turner MD  •  ondansetron (ZOFRAN) tablet 4 mg, 4 mg, Oral, Q6H PRN **OR**  ondansetron (ZOFRAN) injection 4 mg, 4 mg, Intravenous, Q6H PRN, Amadeo Turner MD, 4 mg at 06/13/22 1732  •  polyethylene glycol (MIRALAX) packet 17 g, 17 g, Oral, Daily, Amadeo Turner MD, 17 g at 06/14/22 0912  •  saccharomyces boulardii (FLORASTOR) capsule 250 mg, 250 mg, Oral, Daily, Amadeo Turner MD, 250 mg at 06/14/22 0911  •  sennosides-docusate (PERICOLACE) 8.6-50 MG per tablet 2 tablet, 2 tablet, Oral, BID, Amadeo Turner MD, 2 tablet at 06/14/22 0911  •  [COMPLETED] Insert peripheral IV, , , Once **AND** sodium chloride 0.9 % flush 10 mL, 10 mL, Intravenous, PRN, Amadeo Turner MD  •  sodium chloride 0.9 % flush 10 mL, 10 mL, Intravenous, Q12H, Amadeo Turner MD, 10 mL at 06/14/22 0913  •  sodium chloride 0.9 % flush 10 mL, 10 mL, Intravenous, PRN, Amadeo Turner MD  •  sucralfate (CARAFATE) tablet 1 g, 1 g, Oral, 4x Daily AC & at Bedtime, Amadeo Turner MD, 1 g at 06/14/22 1131  •  thiamine (VITAMIN B-1) tablet 100 mg, 100 mg, Oral, Daily, Amadeo Turner MD, 100 mg at 06/14/22 0911  •  traMADol (ULTRAM) tablet 100 mg, 100 mg, Oral, TID PRN, Amadeo Turner MD, 100 mg at 06/09/22 1644    Review of Systems:  Review of Systems   Constitutional: Positive for fatigue. Negative for chills and fever.   HENT: Negative for rhinorrhea and sore throat.    Respiratory: Negative for cough and shortness of breath.    Cardiovascular: Positive for leg swelling. Negative for chest pain and palpitations.   Gastrointestinal: Negative for diarrhea, nausea and vomiting.   Genitourinary: Negative for frequency and urgency.   Musculoskeletal: Positive for gait problem and myalgias. Negative for arthralgias.   Skin: Positive for color change and wound.   Neurological: Positive for weakness. Negative for dizziness.   Psychiatric/Behavioral: Negative for agitation, behavioral problems and confusion.         OBJECTIVE     Vitals:    06/14/22 1057   BP: 142/49   Pulse: 63   Resp: 16   Temp:  97.8 °F (36.6 °C)   SpO2:        PHYSICAL EXAM  Physical Exam  Vitals and nursing note reviewed.   Constitutional:       General: She is awake.   HENT:      Head: Normocephalic and atraumatic.   Eyes:      General: Lids are normal. Gaze aligned appropriately. No visual field deficit.  Cardiovascular:      Rate and Rhythm: Normal rate and regular rhythm.   Pulmonary:      Effort: Pulmonary effort is normal. No respiratory distress.   Abdominal:      General: There is no distension.      Palpations: Abdomen is soft.   Genitourinary:     Comments: Urethral catheter in place  Musculoskeletal:      Cervical back: Normal range of motion and neck supple.      Right lower leg: No edema.      Left lower leg: No edema.      Right foot: Decreased range of motion. Swelling present.      Left foot: Decreased range of motion. Swelling present.      Comments: increased edema of lower extremities today  Feet:      Right foot:      Skin integrity: Skin integrity normal. No skin breakdown or dry skin.      Left foot:      Skin integrity: Skin integrity normal. No skin breakdown or dry skin.   Skin:     General: Skin is warm and dry.      Findings: Erythema and wound present.      Comments: Pitting edema of lower extremities 2+ edema  Neurological:      Mental Status: She is oriented x3     Motor: Weakness present.     Gait: Gait abnormal.  Psychiatric:         Attention and Perception: Attention normal.         Mood and Affect: Mood normal.         Speech: Speech normal.         Behavior: Behavior is cooperative.         Cognition and Memory: Memory is impaired.           Results Review:  Lab Results (last 48 hours)     Procedure Component Value Units Date/Time    Comprehensive Metabolic Panel [[id number]]  (Abnormal) Collected: 06/14/22 0750    Specimen: Blood Updated: 06/14/22 0903     Glucose 102 mg/dL      BUN 9 mg/dL      Creatinine 0.62 mg/dL      Sodium 138 mmol/L      Potassium 4.0 mmol/L      Chloride 107 mmol/L      CO2 26.0  mmol/L      Calcium 8.3 mg/dL      Total Protein 6.8 g/dL      Albumin 2.50 g/dL      ALT (SGPT) 6 U/L      AST (SGOT) 23 U/L      Alkaline Phosphatase 92 U/L      Total Bilirubin 0.2 mg/dL      Globulin 4.3 gm/dL      A/G Ratio 0.6 g/dL      BUN/Creatinine Ratio 14.5     Anion Gap 5.0 mmol/L      eGFR 97.1 mL/min/1.73      Comment: National Kidney Foundation and American Society of Nephrology (ASN) Task Force recommended calculation based on the Chronic Kidney Disease Epidemiology Collaboration (CKD-EPI) equation refit without adjustment for race.       Narrative:      GFR Normal >60  Chronic Kidney Disease <60  Kidney Failure <15      CBC (No Diff) [[id number]]  (Abnormal) Collected: 06/14/22 0750    Specimen: Blood Updated: 06/14/22 0819     WBC 4.40 10*3/mm3      RBC 3.01 10*6/mm3      Hemoglobin 8.0 g/dL      Hematocrit 26.3 %      MCV 87.4 fL      MCH 26.6 pg      MCHC 30.4 g/dL      RDW 16.2 %      RDW-SD 51.1 fl      MPV 9.4 fL      Platelets 171 10*3/mm3     Tissue / Bone Culture - Bone, Hip, Right [[id number]] Collected: 06/10/22 1607    Specimen: Bone from Hip, Right Updated: 06/13/22 0938     Tissue Culture No growth at 3 days     Gram Stain Rare (1+) WBCs per low power field      No organisms seen        Imaging Results (Last 72 Hours)     Procedure Component Value Units Date/Time    XR Chest 1 View [[id number]] Collected: 06/11/22 1522     Updated: 06/11/22 1526    Narrative:      EXAMINATION: XR CHEST 1 VW-     6/11/2022 2:04 PM CDT     HISTORY: hypoxemia     1 view chest x-ray.     Comparison is made with June 4, 2022.     Stable heart size.  Unchanged left cardiac pacer.     Interstitial lung disease is again seen and appears to be chronic.     No pneumothorax.     No focal consolidation is seen.     Summary:  1. Chronic interstitial lung disease.  This report was finalized on 06/11/2022 15:23 by Dr. Jesus Manuel Santos MD.             ASSESSMENT/PLAN       Examination and evaluation of wound(s) was  performed.    DIAGNOSIS:   Pressure injury of right lower leg, stage 3  Impaired mobility  Abscess, right hip  Chronic embolism and thrombosis of unspecified deep veins of left lower extremity  Rheumatoid arthritis involving multiple sites       PLAN:   Restart Unna boots today or tomorrow when physical therapy is available to apply.     Discussed findings and treatment plan including risks, benefits, and treatment options with patient in detail. Patient agreed with treatment plan.      This document has been electronically signed by DANIELA Mullins on 6/14/2022 13:22 CDT       Time spent in face-to-face evaluation, chart review, planning and education 35 minutes with greater than 50% of time spent with patient and/or family and in coordination of care.  Counseling of patient and/or family includes discussing wound diagnosis and etiology , discussing risks and benefits, counseling on risk factor reduction, giving instructions including follow-up management and patient/family education.

## 2022-06-14 NOTE — PROGRESS NOTES
[facility] Medicine Services  INPATIENT PROGRESS NOTE    Patient Name: Tawny Shin  Date of Admission: 6/3/2022  Today's Date: 06/13/22  Length of Stay: 9  Primary Care Physician: Leta Julian DO    Subjective   Chief Complaint: right hip wound    HPI:  Patient seen and examined at bedswide.  Family at bedside.  Patient awake and alert today, feels a little sick to her stomach today but is better.    Review of Systems   Constitutional: Positive for fatigue. Negative for activity change, appetite change, chills and fever.   Respiratory: Negative for cough and shortness of breath.    Cardiovascular: Negative for chest pain and palpitations.   Gastrointestinal: Negative for abdominal distention, abdominal pain, diarrhea, nausea and vomiting.   Neurological: Negative for weakness.   Psychiatric/Behavioral: Negative for decreased concentration and dysphoric mood.     All pertinent negatives and positives are as above. All other systems have been reviewed and are negative unless otherwise stated.     Objective    Temp:  [97.5 °F (36.4 °C)-98.2 °F (36.8 °C)] 98 °F (36.7 °C)  Heart Rate:  [60-68] 62  Resp:  [16] 16  BP: (159-220)/(50-84) 163/50  Physical Exam  Vitals and nursing note reviewed.   Constitutional:       Appearance: Normal appearance.   HENT:      Head: Normocephalic and atraumatic.      Mouth/Throat:      Mouth: Mucous membranes are dry.      Pharynx: Oropharynx is clear.   Eyes:      General: No scleral icterus.     Conjunctiva/sclera: Conjunctivae normal.   Cardiovascular:      Rate and Rhythm: Normal rate and regular rhythm.   Pulmonary:      Effort: Pulmonary effort is normal. No respiratory distress.      Breath sounds: Normal breath sounds.   Abdominal:      General: Abdomen is flat. Bowel sounds are normal. There is no distension.      Palpations: Abdomen is soft. There is no mass.      Tenderness: There is no abdominal tenderness.      Hernia: No hernia  is present.   Skin:     General: Skin is warm and dry.      Coloration: Skin is pale.   Neurological:      General: No focal deficit present.      Mental Status: She is alert and oriented to person, place, and time.   Psychiatric:         Attention and Perception: Attention normal.         Mood and Affect: Mood normal.         Cognition and Memory: Cognition normal.         Judgment: Judgment normal.       Results Review:  I have reviewed the labs, radiology results, and diagnostic studies.    Laboratory Data:   Results from last 7 days   Lab Units 06/12/22  0755 06/11/22  0613 06/10/22  0929   WBC 10*3/mm3 4.07 5.75 5.57   HEMOGLOBIN g/dL 8.4* 8.4* 8.9*   HEMATOCRIT % 27.7* 27.9* 29.4*   PLATELETS 10*3/mm3 179 205 201        Results from last 7 days   Lab Units 06/12/22 0755 06/11/22  0613 06/10/22  1019   SODIUM mmol/L 140 138 135*   POTASSIUM mmol/L 3.5 3.6 3.7   CHLORIDE mmol/L 107 103 101   CO2 mmol/L 27.0 26.0 28.0   BUN mg/dL 13 13 12   CREATININE mg/dL 0.71 0.76 0.64   CALCIUM mg/dL 8.5* 8.5* 8.5*   BILIRUBIN mg/dL 0.2 0.3 0.3   ALK PHOS U/L 84 84 93   ALT (SGPT) U/L 6 6 8   AST (SGOT) U/L 14 13 13   GLUCOSE mg/dL 93 83 107*       Culture Data:   Blood Culture   Date Value Ref Range Status   06/03/2022 No growth at 5 days  Final   06/03/2022 No growth at 5 days  Final     Urine Culture   Date Value Ref Range Status   06/04/2022 >100,000 CFU/mL Pseudomonas aeruginosa (A)  Final   06/04/2022 >100,000 CFU/mL Escherichia coli (A)  Final     Wound Culture   Date Value Ref Range Status   06/04/2022 Scant growth (1+) Staphylococcus aureus (A)  Final   06/04/2022 Light growth (2+) Staphylococcus aureus (A)  Final       Radiology Data:   Imaging Results (Last 24 Hours)     ** No results found for the last 24 hours. **          I have reviewed the patient's current medications.     Assessment/Plan     Active Hospital Problems    Diagnosis    • **Abscess, right hip    • Encephalopathy, toxic    • Cholelithiasis    •  Anemia of chronic disease    • Pressure injury of skin of heel    • Chronic pain    • Chronic indwelling Horan catheter    • Abscess of right hip      Added automatically from request for surgery [id number]     • Essential hypertension    • Rheumatoid arthritis involving multiple sites (HCC)    • Functional neurological symptom disorder with weakness or paralysis    • Chronic anticoagulation    • Chronic embolism and thrombosis of unspecified deep veins of left lower extremity (HCC)        Plan:    Patient was admitted on 6/3 by Dr. Paul.  Patient was noted to have right hip pain and drainage from the site.    General surgery was consulted, the patient was taken to the OR on 6/4 by Dr. Salcido.  The CT scan showed inflamed fluid collection.  There was noted to be some necrotic tissue in there, the patient had significant irrigation and debridement of the necrotic tissue.  The patient went to OR again with Dr. Turner, bone culture obtained.  Wound cleaned copiously irrigated with vancomycin-containing saline.      Infectious disease was consulted, she has been followed by them periodically for history of infections in this area in the past.  Cultures have again grown staph aureus.  Patient's urine is growing Pseudomonas and E. coli.  Infectious disease managing antibiotics.    Plastic surgery was consulted, as they did her previous flap.  They recommended against the wound VAC and to reconsult orthopedic surgery.  Patient's MRI show concern for osteomyelitis as well as abscess formation in the deeper tissues.    Appreciate wound care consult as well.    Patient has had no right upper quadrant abdominal pain for me.    After patients confusion on 6/11, patient had EEG ordered.  MRI showed no acute processes, but did show that there were some inspissated secretions and fungal sinusitis would be in this differential.  If continues to be an issues may have to consider ENT consult to evaluate this further.  CXR and ABG  unremarkable.      MRI Brain With & Without Contrast    Result Date: 6/10/2022  EXAMINATION:  MRI BRAIN W WO CONTRAST-  6/10/2022 7:00 PM CDT  HISTORY: Mental status change, unknown cause.  TECHNIQUE: Multiplanar imaging was performed in a high field magnet before and after gadolinium contrast administration.  COMPARISON: 2/9/2021.  FINDINGS: There is no evidence of acute infarct on the diffusion-weighted sequence. There are scattered areas of T2 high signal within the hemispheric periventricular white matter. There is a rim of T2 high signal seen best on the FLAIR sequence around the lateral ventricles. There is mild atrophy. There is moderate dilatation of the lateral and third ventricles. There are no mass lesions. There are no areas of abnormal contrast enhancement intracranially. There is a partially empty appearance of the sella turcica. There is a small amount of fluid in the optic nerve sheaths. The transverse sinuses are normal in size. There is mucosal thickening in the paranasal sinuses predominantly in the maxillary, ethmoid and sphenoid sinuses. There is T1 high signal within the maxillary and sphenoid sinuses and to a lesser extent within the posterior left ethmoid sinus. These areas demonstrate relative flow void on T2 images. There is mucosal enhancement of the paranasal sinuses.      Impression: 1. Scattered areas of T2 high signal within the hemispheric white matter are nonspecific and likely due to chronic small vessel disease. 2. Mild atrophy. 3. There is moderate dilatation of the lateral and third ventricles. This may be related to the atrophy. There is a band of T2 high signal seen best on the FLAIR sequence surrounding the lateral ventricles suggesting possible transependymal flow. Hydrocephalus is not ruled out. 4. Sinus disease, as described. T1 high signal within the maxillary, sphenoid and posterior left ethmoid region could be related to inspissated secretions. Fungal sinusitis is also  in the differential diagnosis.  This report was finalized on 06/10/2022 19:56 by Dr. Zurdo Vazquez MD.      Home medications reviewed, and resumed as appropriate.    [facility] vs. SNF for wound care and minimal 4 weeks of cefazolin per ID.    Discharge Planning: I expect the patient to be discharged to [facility] or SNF in 1-2 days.    Electronically signed by Moises Oliveira MD, 06/13/22, 21:51 CDT.

## 2022-06-14 NOTE — PLAN OF CARE
Goal Outcome Evaluation:  Plan of Care Reviewed With: patient        Progress: no change  Outcome Evaluation: Bp elevated again during the shift, prn given. Pt has been a/o, has rested w/eyes closed this shift. Rt hip drsg changed, premedicated prior. Horan care done. Assisting w/turn/repositioning/pericare. Safety maintained.

## 2022-06-14 NOTE — PLAN OF CARE
Goal Outcome Evaluation:   Alert and oriented. Denies need for pain intervention at this time. Dressing changed per Nurse practitioner. Discharge instructions received and report given to [facility]. Transported per bed by staff. To room 576. Stable upon discharge.

## 2022-06-14 NOTE — THERAPY TREATMENT NOTE
Acute Care - Physical Therapy Treatment Note  [facility]     Patient Name: Tawny Shin  : 1955  MRN: [id number]  Today's Date: 2022      Visit Dx:     ICD-10-CM ICD-9-CM   1. Abscess of right hip  L02.415 682.6   2. Impaired mobility  Z74.09 799.89   3. Abscess of hip, right  L02.415 682.6   4. Other dysphagia  R13.19 787.29     Patient Active Problem List   Diagnosis   • T12 compression fracture, initial encounter ([facility])   • Chronic embolism and thrombosis of unspecified deep veins of left lower extremity (HCC)   • Urinary tract infection without hematuria   • Acute bilateral thoracic back pain   • Chronic anticoagulation   • Hypokalemia   • Transaminitis   • Iron deficiency anemia   • Osteoporosis   • Bacteremia   • Epidural hematoma ([facility])   • Pleural effusion, left   • Functional neurological symptom disorder with weakness or paralysis   • Overweight (BMI 25.0-29.9)   • Rheumatoid arthritis involving multiple sites ([facility])   • (HFpEF) heart failure with preserved ejection fraction ([facility]), acute on chronic   • Venous insufficiency   • Coronary artery disease involving native coronary artery of native heart without angina pectoris   • Sick sinus syndrome ([facility])   • Essential hypertension   • Pressure injury of skin of heel   • Abscess, right hip   • Chronic pain   • Chronic indwelling Horan catheter   • Abscess of right hip   • Anemia of chronic disease   • Cholelithiasis   • Encephalopathy, toxic     Past Medical History:   Diagnosis Date   • Age-related osteoporosis with current pathological fracture 2020   • Arthritis    • Asthma    • Bilateral bunions 2020   • Cancer ([facility])    • Cardiac pacemaker syndrome 2020    Overview:  - heart block - implanted    • Charcot's joint of foot, left 2020   • Chronic deep vein thrombosis (DVT) of right lower extremity (HCC) 2021   • Chronic pain syndrome 2021   • Chronic sinusitis    • COPD (chronic obstructive pulmonary  "disease) (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)    • Myalgia due to statin 6/25/2019   • Open wound of right hip 9/14/2021   • Osteomyelitis of right femur ([facility]) 7/6/2021   • Otorrhea    • Pacemaker 11/17/2016   • Primary osteoarthritis of left knee 12/23/2020   • Psoriasis vulgaris 12/23/2020   • S/P coronary artery stent placement 3/9/2016   • Sensorineural hearing loss    • Seropositive rheumatoid arthritis of multiple sites ([facility]) 12/27/2019    Overview:  -myochrysine '93-'96 -methotrexate '96--->11/98;r/s  restarted 2/99--> 8/14 (anemia) -sulfasalazine- not effective -penicillamine 6/98-->10/98; no effect -leflunomide 11/98--> - Humira '13-->didn't take - Enbrel 12/14-->3/15- no effect!   Last Assessment & Plan:  - \"aching all over\" because she had to be off her anti-rheumatic drugs for 2 weeks in preparation for her R knee surgery - he   • Sick sinus syndrome ([facility]) 12/27/2019   • Sjogren's disease ([facility])    • Spondylolisthesis of lumbar region 1/17/2018   • Syncope, recurrent 2/8/2021     Past Surgical History:   Procedure Laterality Date   • A-V CARDIAC PACEMAKER INSERTION  2016   • ATRIAL CARDIAC PACEMAKER INSERTION     • CARDIAC CATHETERIZATION     • CATARACT EXTRACTION     • CERVICAL CORPECTOMY N/A 3/3/2021    Procedure: CERVICAL 6 CORPECTOMY WITH TITANIUM CAGE WITH NEURO MONITORING;  Surgeon: Bandar Shea MD;  Location: [facility];  Service: Neurosurgery;  Laterality: N/A;   • COLONOSCOPY  11/08/2011    One fold in the ascending colon which showed ulcer " otherwise normal exam   • COLONOSCOPY  11/12/2004    Normal exam repeat in five years   • CORONARY ANGIOPLASTY WITH STENT PLACEMENT      X 2; 2013 & 2014   • ENDOSCOPY  07/10/2014    Normal exam   • FLAP LEG Right 9/14/2021    Procedure: RIGHT GLUTEAL FASCIOCUTANEOUS ADVANCEMENT FLAP AND RIGHT TENSOR FASCIAL JESSICA FLAP;  Surgeon: Amadeo Turner MD;  Location:  [facility];  Service: Plastics;  Laterality: Right;   • HIP ABDUCTION TENOTOMY BILATERAL Right 1/14/2021    Procedure: RIGHT HIP GLUTEUS MEDLUS / MINIMUS REPAIR, POSSIBLE ACHILLES ALLOGRAFT;  Surgeon: Nino Carlson MD;  Location:  [facility];  Service: Orthopedics;  Laterality: Right;   • INCISION AND DRAINAGE ABSCESS Right 6/4/2022    Procedure: INCISION AND DRAINAGE ABSCESS right hip;  Surgeon: Magda Salcido MD;  Location:  [facility];  Service: General;  Laterality: Right;   • INCISION AND DRAINAGE ABSCESS Right 6/10/2022    Procedure: RIGHT HIP INCISION AND DRAINAGE. MD NEEDS 3L VANC IRRIGATION, CURRETTES, DAICANS, KERLEX ROLLS;  Surgeon: Amadeo Turner MD;  Location:  [facility];  Service: Plastics;  Laterality: Right;   • INCISION AND DRAINAGE HIP Right 2/9/2021    Procedure: HIP INCISION AND DRAINAGE;  Surgeon: Nino Carlson MD;  Location:  [facility];  Service: Orthopedics;  Laterality: Right;   • INCISION AND DRAINAGE LEG Right 10/24/2021    Procedure: INCISION AND DRAINAGE LOWER EXTREMITY;  Surgeon: Amadeo Turner MD;  Location:  [facility];  Service: Plastics;  Laterality: Right;   • INCISION AND DRAINAGE OF WOUND Right 7/8/2021    Procedure: INCISION AND DRAINAGE WOUND RIGHT HIP;  Surgeon: James Huntley MD;  Location:  [facility];  Service: Orthopedics;  Laterality: Right;   • JOINT REPLACEMENT     • KYPHOPLASTY WITH BIOPSY Bilateral 10/26/2021    Procedure: THOARCIC 12 KYPHOPLASTY WITH BIOPSY;  Surgeon: Bandar Shea MD;  Location:  [facility];  Service: Neurosurgery;  Laterality: Bilateral;   • LEG DEBRIDEMENT Right 9/14/2021     Procedure: DEBRIDEMENT OF RIGHT HIP WOUND, RIGHT GLUTEAL FASCIOCUTANEOUS ADVANCEMENT FLAP AND RIGHT TENSOR FASCIAL JESSICA FLAP;  Surgeon: Amadeo Turner MD;  Location:  [facility];  Service: Plastics;  Laterality: Right;   • LUMBAR DISCECTOMY Right 3/23/2021    Procedure: LUMBAR DISCECTOMY MICRO, Lumbar 1/2 right;  Surgeon: Bandar Shea MD;  Location:  [facility];  Service: Neurosurgery;  Laterality: Right;   • LUMBAR FUSION N/A 1/19/2018    Procedure: L3-4,L4-5 DECOMPRESSION, POSTERIOR SPINAL FUSION WITH INSTRUMENTATION;  Surgeon: Fortino Oropeza MD;  Location:  [facility];  Service:    • LUMBAR LAMINECTOMY WITH FUSION Left 1/17/2018    Procedure: LEFT L3-4 L4-5 LATERAL LUMBAR INTERBODY FUSION;  Surgeon: Fortino Oropeza MD;  Location:  [facility];  Service:    • MYRINGOTOMY W/ TUBES  09/04/2014    TUBES NO LONGER IN PLACE   • OTHER SURGICAL HISTORY      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 so hardware was removed and spacers were placed   • REPLACEMENT TOTAL KNEE Right      PT Assessment (last 12 hours)     PT Evaluation and Treatment     Row Name 06/14/22 [facility]7 06/14/22 1025       Physical Therapy Time and Intention    Subjective Information no complaints  -KJ no complaints  -KJ    Document Type therapy note (daily note)  -KJ therapy note (daily note)  -KJ    Mode of Treatment physical therapy  -KJ physical therapy  -KJ    Patient Effort adequate  -KJ adequate  -KJ    Comment -- BP WNL this am; called Polly with wound care re; unna boots. She is to evaluate pt to see if they are appropriate.  -KJ    Row Name 06/14/22 1357 06/14/22 1025       General Information    Existing Precautions/Restrictions fall  -KJ fall  -KJ    Row Name 06/14/22 1357 06/14/22 1025       Pain    Pretreatment Pain Rating 3/10  -KJ 3/10  -KJ    Posttreatment Pain Rating 4/10  -KJ 4/10  -KJ    Pain Location - Side/Orientation Right  -KJ Right  -KJ    Pain Location - hip  -KJ hip  -KJ    Row Name 06/14/22 1357 06/14/22 1025       Bed  Mobility    Rolling Left Winn (Bed Mobility) moderate assist (50% patient effort);verbal cues  -KJ --    Rolling Right Winn (Bed Mobility) moderate assist (50% patient effort);verbal cues  -KJ --    Supine-Sit Winn (Bed Mobility) verbal cues;moderate assist (50% patient effort);2 person assist  -KJ verbal cues;moderate assist (50% patient effort);2 person assist  -KJ    Bed Mobility, Safety Issues decreased use of arms for pushing/pulling;decreased use of legs for bridging/pushing  -KJ decreased use of arms for pushing/pulling;decreased use of legs for bridging/pushing  -KJ    Row Name 06/14/22 1357 06/14/22 1025       Wheelchair Transfer    Type (Wheelchair Transfer) -- lateral  -KJ    Winn Level (Wheelchair Transfer) maximum assist (25% patient effort);2 person assist;verbal cues  -KJ maximum assist (25% patient effort);2 person assist;verbal cues  -KJ    Assistive Device (Wheelchair Transfer) wheelchair  sliding board  -KJ wheelchair  sliding board  -KJ    Comment, (Wheelchair Transfer) back to bed sliding board max assist 2  -KJ sliding board transfer to W/C  -KJ    Row Name 06/14/22 1025          Balance    Balance Assessment sitting static balance  -KJ     Static Sitting Balance verbal cues;contact guard  -KJ     Dynamic Sitting Balance verbal cues;minimal assist  -KJ     Position, Sitting Balance sitting edge of bed;unsupported  -KJ     Row Name 06/14/22 1357          Motor Skills    Therapeutic Exercise aerobic  -KJ     Row Name 06/14/22 1357          Aerobic Exercise    Comment, Aerobic Exercise (Therapeutic Exercise) A/AAROM  B LE  -KJ     Row Name             Wound 06/04/22 0211 Right anterior greater trochanter    Wound - Properties Group Placement Date: 06/04/22  -LV Placement Time: 0211  -LV Present on Hospital Admission: Y  -LV Side: Right  -LV Orientation: anterior  -LV Location: greater trochanter  -LV     Retired Wound - Properties Group Placement Date: 06/04/22  -LV  Placement Time: 0211 -LV Present on Hospital Admission: Y  -LV Side: Right  -LV Orientation: anterior  -LV Location: greater trochanter  -LV     Retired Wound - Properties Group Date first assessed: 06/04/22  -LV Time first assessed: 0211 -LV Present on Hospital Admission: Y  -LV Side: Right  -LV Location: greater trochanter  -LV     Row Name             Wound 04/22/22 1858 Left posterior heel Pressure Injury    Wound - Properties Group Placement Date: 04/22/22  -BS Placement Time: 1858 -BS Side: Left  -MT Orientation: posterior  -MT Location: heel  -MT Primary Wound Type: Pressure inj  -MT Removal Date: --  -BS Removal Time: --  -BS Wound Outcome: Healed  -MT     Retired Wound - Properties Group Placement Date: 04/22/22  -BS Placement Time: 1858 -BS Side: Left  -MT Orientation: posterior  -MT Location: heel  -MT Primary Wound Type: Pressure inj  -MT Removal Date: --  -BS Removal Time: --  -BS Wound Outcome: Healed  -MT     Retired Wound - Properties Group Date first assessed: 04/22/22  -BS Time first assessed: 1858 -BS Side: Left  -MT Location: heel  -MT Primary Wound Type: Pressure inj  -MT Resolution Date: --  -BS Resolution Time: --  -BS Wound Outcome: Healed  -MT     Row Name             Wound 04/22/22 1858 Right lateral leg Pressure Injury    Wound - Properties Group Placement Date: 04/22/22  -BS Placement Time: 1858 -BS Present on Hospital Admission: Y  -MT Side: Right  -MT Orientation: lateral  -MT Location: leg  -MT Primary Wound Type: Pressure inj  -MT Removal Date: --  -BS Removal Time: --  -BS     Retired Wound - Properties Group Placement Date: 04/22/22  -BS Placement Time: 1858 -BS Present on Hospital Admission: Y  -MT Side: Right  -MT Orientation: lateral  -MT Location: leg  -MT Primary Wound Type: Pressure inj  -MT Removal Date: --  -BS Removal Time: --  -BS     Retired Wound - Properties Group Date first assessed: 04/22/22  -BS Time first assessed: 1858 -BS Present on Hospital Admission: Y   -MT Side: Right  -MT Location: leg  -MT Primary Wound Type: Pressure inj  -MT Resolution Date: --  -BS Resolution Time: --  -BS     Row Name             Wound 04/22/22 1726 Left anterior greater trochanter    Wound - Properties Group Placement Date: 04/22/22  -MT Placement Time: 1726  -MT Present on Hospital Admission: Y  -MT Side: Left  -MT Orientation: anterior  -MT Location: greater trochanter  -MT     Retired Wound - Properties Group Placement Date: 04/22/22  -MT Placement Time: 1726  -MT Present on Hospital Admission: Y  -MT Side: Left  -MT Orientation: anterior  -MT Location: greater trochanter  -MT     Retired Wound - Properties Group Date first assessed: 04/22/22  -MT Time first assessed: 1726  -MT Present on Hospital Admission: Y  -MT Side: Left  -MT Location: greater trochanter  -MT     Row Name             Wound 06/04/22 0827 Right anterior hip Incision    Wound - Properties Group Placement Date: 06/04/22  -HW Placement Time: 0827  -HW Side: Right  -HW Orientation: anterior  -HW Location: hip  -HW Primary Wound Type: Incision  -HW     Retired Wound - Properties Group Placement Date: 06/04/22  -HW Placement Time: 0827  -HW Side: Right  -HW Orientation: anterior  -HW Location: hip  -HW Primary Wound Type: Incision  -HW     Retired Wound - Properties Group Date first assessed: 06/04/22  -HW Time first assessed: 0827  -HW Side: Right  -HW Location: hip  -HW Primary Wound Type: Incision  -HW     Row Name             Wound 06/07/22 0414 Right gluteal    Wound - Properties Group Placement Date: 06/07/22  -LV Placement Time: 0414  -LV Side: Right  -LV Location: gluteal  -LV     Retired Wound - Properties Group Placement Date: 06/07/22  -LV Placement Time: 0414  -LV Side: Right  -LV Location: gluteal  -LV     Retired Wound - Properties Group Date first assessed: 06/07/22  -LV Time first assessed: 0414  -LV Side: Right  -LV Location: gluteal  -LV     Row Name             Wound 06/10/22 1530 Right hip Incision     Wound - Properties Group Placement Date: 06/10/22  -TANIKA Placement Time: 1530  -TANIKA Present on Hospital Admission: Y  -TANIKA Side: Right  -TANIKA Location: hip  -TANIKA Primary Wound Type: Incision  -TANIKA     Retired Wound - Properties Group Placement Date: 06/10/22  -TANIKA Placement Time: 1530  -TANIKA Present on Hospital Admission: Y  -TANIKA Side: Right  -TANIKA Location: hip  -TANIKA Primary Wound Type: Incision  -TANIKA     Retired Wound - Properties Group Date first assessed: 06/10/22  -TANIKA Time first assessed: 1530  -TANIKA Present on Hospital Admission: Y  -TANIKA Side: Right  -TANIKA Location: hip  -TANIKA Primary Wound Type: Incision  -TANIKA     Row Name             NPWT (Negative Pressure Wound Therapy) 06/06/22 1000 R hip    NPWT (Negative Pressure Wound Therapy) - Properties Group Placement Date: 06/06/22  -MS Placement Time: 1000  -MS Location: R hip  -MS     Retired NPWT (Negative Pressure Wound Therapy) - Properties Group Placement Date: 06/06/22  -MS Placement Time: 1000  -MS Location: R hip  -MS     Retired NPWT (Negative Pressure Wound Therapy) - Properties Group Placement Date: 06/06/22  -MS Placement Time: 1000  -MS Location: R hip  -MS     Row Name 06/14/22 1357 06/14/22 1025       Positioning and Restraints    Pre-Treatment Position sitting in chair/recliner  -KJ in bed  -KJ    Post Treatment Position bed  -KJ wheelchair  -KJ          User Key  (r) = Recorded By, (t) = Taken By, (c) = Cosigned By    Initials Name Provider Type    KJ Emilee Paul, PTA Physical Therapist Assistant    Nicole Palacios, PT, DPT, NCS Physical Therapist    Ranjana Sin RN Registered Nurse    Ewelina Eubanks RN Registered Nurse    Leeann Younger RN Registered Nurse    Benjie Melendez, RN Registered Nurse    Consuelo Rodríguez RN Registered Nurse                Physical Therapy Education                 Title: PT OT SLP Therapies (In Progress)     Topic: Physical Therapy (In Progress)     Point: Mobility training (Done)     Learning Progress  Summary           Patient Eager, E, VU by AE at 6/13/2022 1303    Comment: POC    Nonacceptance, E, NR by AB at 6/11/2022 0842                   Point: Home exercise program (Done)     Learning Progress Summary           Patient Eager, E, VU by AE at 6/13/2022 1303    Comment: POC    Nonacceptance, E, NR by AB at 6/11/2022 0842                   Point: Body mechanics (In Progress)     Learning Progress Summary           Patient Nonacceptance, E, NR by AB at 6/11/2022 0842                   Point: Precautions (In Progress)     Learning Progress Summary           Patient Nonacceptance, E, NR by AB at 6/11/2022 0842    Acceptance, E, VU by MS at 6/6/2022 1138    Comment: wound vac role in pt care and healing                               User Key     Initials Effective Dates Name Provider Type Discipline    AB 08/02/16 -  Kasie Perkins, PTA Physical Therapist Assistant PT    AE 06/22/15 -  Rosemary Guardado, PTA Physical Therapist Assistant PT    MS 06/19/18 -  Nicole Olson, PT, DPT, NCS Physical Therapist PT              PT Recommendation and Plan     Plan of Care Reviewed With: patient  Progress: improving  Outcome Evaluation: PT tx completed. Pt alert and oriented. Feels better today she reports. Minimal pn RLE/hip region. Rolling L<>R Mod, sup<>sit Mod/MaxA x 2, static sitting balance improved to CG. Performed sliding board t-marvin to w/c MaxA of 2. Pt eager to sit up. Worked on w/c mobility in room. Recommend SNF.   Outcome Measures     Row Name 06/14/22 1100 06/13/22 1300 06/12/22 0749       How much help from another person do you currently need...    Turning from your back to your side while in flat bed without using bedrails? 3  -KJ 3  -AE 3  -AB    Moving from lying on back to sitting on the side of a flat bed without bedrails? 2  -KJ 2  -AE 1  -AB    Moving to and from a bed to a chair (including a wheelchair)? 1  -KJ 1  -AE 1  -AB    Standing up from a chair using your arms (e.g., wheelchair, bedside  chair)? 1  -KJ 1  -AE 1  -AB    Climbing 3-5 steps with a railing? 1  -KJ 1  -AE 1  -AB    To walk in hospital room? 1  -KJ 1  -AE 1  -AB    AM-PAC 6 Clicks Score (PT) 9  -KJ 9  -AE 8  -AB       Functional Assessment    Outcome Measure Options AM-PAC 6 Clicks Basic Mobility (PT)  -KJ -- AM-PAC 6 Clicks Basic Mobility (PT)  -AB          User Key  (r) = Recorded By, (t) = Taken By, (c) = Cosigned By    Initials Name Provider Type    AB Kasie Perkins, PTA Physical Therapist Assistant    Rosemary Skaggs, PTA Physical Therapist Assistant    Emilee Gloria, SERENA Physical Therapist Assistant                 Time Calculation:    PT Charges     Row Name 06/14/22 1411 06/14/22 1109          Time Calculation    Start Time 1357  -KJ 1025  -KJ     Stop Time 1412  -KJ 1104  -KJ     Time Calculation (min) 15 min  -KJ 39 min  -KJ     PT Received On -- 06/14/22  -KJ     PT Goal Re-Cert Due Date -- 06/17/22  -KJ            Time Calculation- PT    Total Timed Code Minutes- PT 15 minute(s)  -KJ 39 minute(s)  -KJ           User Key  (r) = Recorded By, (t) = Taken By, (c) = Cosigned By    Initials Name Provider Type    Emilee Gloria PTA Physical Therapist Assistant              Therapy Charges for Today     Code Description Service Date Service Provider Modifiers Qty    84336259340 HC PT THERAPEUTIC ACT EA 15 MIN 6/14/2022 Emilee Paul PTA GP 3    77156747167 HC PT THERAPEUTIC ACT EA 15 MIN 6/14/2022 Emilee Paul, PTA GP 2          PT G-Codes  Outcome Measure Options: AM-PAC 6 Clicks Basic Mobility (PT)  AM-PAC 6 Clicks Score (PT): 9    Emilee Paul PTA  6/14/2022

## 2022-06-14 NOTE — THERAPY TREATMENT NOTE
Acute Care - Physical Therapy Treatment Note  [facility]     Patient Name: Tawny Shin  : 1955  MRN: [id number]  Today's Date: 2022      Visit Dx:     ICD-10-CM ICD-9-CM   1. Abscess of right hip  L02.415 682.6   2. Impaired mobility  Z74.09 799.89   3. Abscess of hip, right  L02.415 682.6   4. Other dysphagia  R13.19 787.29     Patient Active Problem List   Diagnosis   • T12 compression fracture, initial encounter ([facility])   • Chronic embolism and thrombosis of unspecified deep veins of left lower extremity (HCC)   • Urinary tract infection without hematuria   • Acute bilateral thoracic back pain   • Chronic anticoagulation   • Hypokalemia   • Transaminitis   • Iron deficiency anemia   • Osteoporosis   • Bacteremia   • Epidural hematoma ([facility])   • Pleural effusion, left   • Functional neurological symptom disorder with weakness or paralysis   • Overweight (BMI 25.0-29.9)   • Rheumatoid arthritis involving multiple sites ([facility])   • (HFpEF) heart failure with preserved ejection fraction ([facility]), acute on chronic   • Venous insufficiency   • Coronary artery disease involving native coronary artery of native heart without angina pectoris   • Sick sinus syndrome ([facility])   • Essential hypertension   • Pressure injury of skin of heel   • Abscess, right hip   • Chronic pain   • Chronic indwelling Horan catheter   • Abscess of right hip   • Anemia of chronic disease   • Cholelithiasis   • Encephalopathy, toxic     Past Medical History:   Diagnosis Date   • Age-related osteoporosis with current pathological fracture 2020   • Arthritis    • Asthma    • Bilateral bunions 2020   • Cancer (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   • Elevated cholesterol    • Eustachian tube dysfunction    • Heart disease    • Herpes simplex    • History of transfusion    • Hyperlipidemia    • Hypertension    • Hypothyroidism 12/23/2020   • Intrinsic asthma 12/23/2020   • Knee dislocation    • Labral tear of right hip joint    • Laryngitis sicca    • Laryngitis, chronic    • Left carotid bruit 3/9/2016   • MI (myocardial infarction) ([facility])    • Myalgia due to statin 6/25/2019   • Open wound of right hip 9/14/2021   • Osteomyelitis of right femur ([facility]) 7/6/2021   • Otorrhea    • Pacemaker 11/17/2016   • Primary osteoarthritis of left knee 12/23/2020   • Psoriasis vulgaris 12/23/2020   • S/P coronary artery stent placement 3/9/2016   • Sensorineural hearing loss    • Seropositive rheumatoid arthritis of multiple sites ([facility]) 12/27/2019    Overview:  -myochrysine '93-'96 -methotrexate '96--->11/98;r/s  restarted 2/99--> 8/14 (anemia) -sulfasalazine- not effective -penicillamine 6/98-->10/98; no effect -leflunomide 11/98--> - Humira '13-->didn't take - Enbrel 12/14-->3/15- no effect!   Last Assessment & Plan:  - \"aching all over\" because she had to be off her anti-rheumatic drugs for 2 weeks in preparation for her R knee surgery - he   • Sick sinus syndrome ([facility]) 12/27/2019   • Sjogren's disease ([facility])    • Spondylolisthesis of lumbar region 1/17/2018   • Syncope, recurrent 2/8/2021     Past Surgical History:   Procedure Laterality Date   • A-V CARDIAC PACEMAKER INSERTION  2016   • ATRIAL CARDIAC PACEMAKER INSERTION     • CARDIAC CATHETERIZATION     • CATARACT EXTRACTION     • CERVICAL CORPECTOMY N/A 3/3/2021    Procedure: CERVICAL 6 CORPECTOMY WITH TITANIUM CAGE WITH NEURO MONITORING;  Surgeon: Bandar Shea MD;  Location: [facility];  Service: Neurosurgery;  Laterality: N/A;   • COLONOSCOPY  11/08/2011    One fold in the ascending colon which showed ulcer " otherwise normal exam   • COLONOSCOPY  11/12/2004    Normal exam repeat in five years   • CORONARY ANGIOPLASTY WITH STENT PLACEMENT      X 2; 2013 & 2014   • ENDOSCOPY  07/10/2014    Normal exam   • FLAP LEG Right 9/14/2021    Procedure: RIGHT GLUTEAL FASCIOCUTANEOUS ADVANCEMENT FLAP AND RIGHT TENSOR FASCIAL JESSICA FLAP;  Surgeon: Amadeo Turner MD;  Location:  [facility];  Service: Plastics;  Laterality: Right;   • HIP ABDUCTION TENOTOMY BILATERAL Right 1/14/2021    Procedure: RIGHT HIP GLUTEUS MEDLUS / MINIMUS REPAIR, POSSIBLE ACHILLES ALLOGRAFT;  Surgeon: Nino Carlson MD;  Location:  [facility];  Service: Orthopedics;  Laterality: Right;   • INCISION AND DRAINAGE ABSCESS Right 6/4/2022    Procedure: INCISION AND DRAINAGE ABSCESS right hip;  Surgeon: Magda Salcido MD;  Location:  [facility];  Service: General;  Laterality: Right;   • INCISION AND DRAINAGE ABSCESS Right 6/10/2022    Procedure: RIGHT HIP INCISION AND DRAINAGE. MD NEEDS 3L VANC IRRIGATION, CURRETTES, DAICANS, KERLEX ROLLS;  Surgeon: Amadeo Turner MD;  Location:  [facility];  Service: Plastics;  Laterality: Right;   • INCISION AND DRAINAGE HIP Right 2/9/2021    Procedure: HIP INCISION AND DRAINAGE;  Surgeon: Nino Carlson MD;  Location:  [facility];  Service: Orthopedics;  Laterality: Right;   • INCISION AND DRAINAGE LEG Right 10/24/2021    Procedure: INCISION AND DRAINAGE LOWER EXTREMITY;  Surgeon: Amadeo Turner MD;  Location:  [facility];  Service: Plastics;  Laterality: Right;   • INCISION AND DRAINAGE OF WOUND Right 7/8/2021    Procedure: INCISION AND DRAINAGE WOUND RIGHT HIP;  Surgeon: James Huntley MD;  Location:  [facility];  Service: Orthopedics;  Laterality: Right;   • JOINT REPLACEMENT     • KYPHOPLASTY WITH BIOPSY Bilateral 10/26/2021    Procedure: THOARCIC 12 KYPHOPLASTY WITH BIOPSY;  Surgeon: Bandar Shea MD;  Location:  [facility];  Service: Neurosurgery;  Laterality: Bilateral;   • LEG DEBRIDEMENT Right 9/14/2021     Procedure: DEBRIDEMENT OF RIGHT HIP WOUND, RIGHT GLUTEAL FASCIOCUTANEOUS ADVANCEMENT FLAP AND RIGHT TENSOR FASCIAL JESSICA FLAP;  Surgeon: Amadeo Turner MD;  Location:  [facility];  Service: Plastics;  Laterality: Right;   • LUMBAR DISCECTOMY Right 3/23/2021    Procedure: LUMBAR DISCECTOMY MICRO, Lumbar 1/2 right;  Surgeon: Bandar Shea MD;  Location:  [facility];  Service: Neurosurgery;  Laterality: Right;   • LUMBAR FUSION N/A 1/19/2018    Procedure: L3-4,L4-5 DECOMPRESSION, POSTERIOR SPINAL FUSION WITH INSTRUMENTATION;  Surgeon: Fortino Oropeza MD;  Location:  [facility];  Service:    • LUMBAR LAMINECTOMY WITH FUSION Left 1/17/2018    Procedure: LEFT L3-4 L4-5 LATERAL LUMBAR INTERBODY FUSION;  Surgeon: Fortino Oropeza MD;  Location:  [facility];  Service:    • MYRINGOTOMY W/ TUBES  09/04/2014    TUBES NO LONGER IN PLACE   • OTHER SURGICAL HISTORY      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 so hardware was removed and spacers were placed   • REPLACEMENT TOTAL KNEE Right      PT Assessment (last 12 hours)     PT Evaluation and Treatment     Row Name 06/14/22 1025          Physical Therapy Time and Intention    Subjective Information no complaints  -KJ     Document Type therapy note (daily note)  -KJ     Mode of Treatment physical therapy  -KJ     Patient Effort adequate  -KJ     Comment BP WNL this am  -KJ     Row Name 06/14/22 1025          General Information    Existing Precautions/Restrictions fall  -KJ     Row Name 06/14/22 1025          Pain    Pretreatment Pain Rating 3/10  -KJ     Posttreatment Pain Rating 4/10  -KJ     Pain Location - Side/Orientation Right  -KJ     Pain Location - hip  -KJ     Row Name 06/14/22 1025          Bed Mobility    Supine-Sit [place] (Bed Mobility) verbal cues;moderate assist (50% patient effort);2 person assist  -KJ     Bed Mobility, Safety Issues decreased use of arms for pushing/pulling;decreased use of legs for bridging/pushing  -KJ     Row Name 06/14/22 1025           Wheelchair Transfer    Type (Wheelchair Transfer) lateral  -KJ     [place] Level (Wheelchair Transfer) maximum assist (25% patient effort);2 person assist;verbal cues  -KJ     Assistive Device (Wheelchair Transfer) wheelchair  sliding board  -KJ     Comment, (Wheelchair Transfer) sliding board transfer to W/C  -KJ     Row Name 06/14/22 1025          Balance    Balance Assessment sitting static balance  -KJ     Static Sitting Balance verbal cues;contact guard  -KJ     Dynamic Sitting Balance verbal cues;minimal assist  -KJ     Position, Sitting Balance sitting edge of bed;unsupported  -KJ     Row Name             Wound 06/04/22 0211 Right anterior greater trochanter    Wound - Properties Group Placement Date: 06/04/22  -LV Placement Time: 0211  -LV Present on Hospital Admission: Y  -LV Side: Right  -LV Orientation: anterior  -LV Location: greater trochanter  -LV     Retired Wound - Properties Group Placement Date: 06/04/22  -LV Placement Time: 0211  -LV Present on Hospital Admission: Y  -LV Side: Right  -LV Orientation: anterior  -LV Location: greater trochanter  -LV     Retired Wound - Properties Group Date first assessed: 06/04/22  -LV Time first assessed: 0211  -LV Present on Hospital Admission: Y  -LV Side: Right  -LV Location: greater trochanter  -LV     Row Name             Wound 04/22/22 1858 Left posterior heel Pressure Injury    Wound - Properties Group Placement Date: 04/22/22  -BS Placement Time: 1858 -BS Side: Left  -MT Orientation: posterior  -MT Location: heel  -MT Primary Wound Type: Pressure inj  -MT Removal Date: --  -BS Removal Time: --  -BS Wound Outcome: Healed  -MT     Retired Wound - Properties Group Placement Date: 04/22/22  -BS Placement Time: 1858  -BS Side: Left  -MT Orientation: posterior  -MT Location: heel  -MT Primary Wound Type: Pressure inj  -MT Removal Date: --  -BS Removal Time: --  -BS Wound Outcome: Healed  -MT     Retired Wound - Properties Group Date first assessed:  04/22/22  -BS Time first assessed: 1858 -BS Side: Left  -MT Location: heel  -MT Primary Wound Type: Pressure inj  -MT Resolution Date: --  -BS Resolution Time: --  -BS Wound Outcome: Healed  -MT     Row Name             Wound 04/22/22 1858 Right lateral leg Pressure Injury    Wound - Properties Group Placement Date: 04/22/22  -BS Placement Time: 1858 -BS Present on Hospital Admission: Y  -MT Side: Right  -MT Orientation: lateral  -MT Location: leg  -MT Primary Wound Type: Pressure inj  -MT Removal Date: --  -BS Removal Time: --  -BS     Retired Wound - Properties Group Placement Date: 04/22/22  -BS Placement Time: 1858 -BS Present on Hospital Admission: Y  -MT Side: Right  -MT Orientation: lateral  -MT Location: leg  -MT Primary Wound Type: Pressure inj  -MT Removal Date: --  -BS Removal Time: --  -BS     Retired Wound - Properties Group Date first assessed: 04/22/22  -BS Time first assessed: 1858 -BS Present on Hospital Admission: Y  -MT Side: Right  -MT Location: leg  -MT Primary Wound Type: Pressure inj  -MT Resolution Date: --  -BS Resolution Time: --  -BS     Row Name             Wound 04/22/22 1726 Left anterior greater trochanter    Wound - Properties Group Placement Date: 04/22/22  -MT Placement Time: 1726  -MT Present on Hospital Admission: Y  -MT Side: Left  -MT Orientation: anterior  -MT Location: greater trochanter  -MT     Retired Wound - Properties Group Placement Date: 04/22/22  -MT Placement Time: 1726  -MT Present on Hospital Admission: Y  -MT Side: Left  -MT Orientation: anterior  -MT Location: greater trochanter  -MT     Retired Wound - Properties Group Date first assessed: 04/22/22  -MT Time first assessed: 1726  -MT Present on Hospital Admission: Y  -MT Side: Left  -MT Location: greater trochanter  -MT     Row Name             Wound 06/04/22 0827 Right anterior hip Incision    Wound - Properties Group Placement Date: 06/04/22  -HW Placement Time: 0827  -HW Side: Right  -HW Orientation:  anterior  -HW Location: hip  -HW Primary Wound Type: Incision  -HW     Retired Wound - Properties Group Placement Date: 06/04/22  -HW Placement Time: 0827  -HW Side: Right  -HW Orientation: anterior  -HW Location: hip  -HW Primary Wound Type: Incision  -HW     Retired Wound - Properties Group Date first assessed: 06/04/22  -HW Time first assessed: 0827  -HW Side: Right  -HW Location: hip  -HW Primary Wound Type: Incision  -HW     Row Name             Wound 06/07/22 0414 Right gluteal    Wound - Properties Group Placement Date: 06/07/22  -LV Placement Time: 0414  -LV Side: Right  -LV Location: gluteal  -LV     Retired Wound - Properties Group Placement Date: 06/07/22  -LV Placement Time: 0414  -LV Side: Right  -LV Location: gluteal  -LV     Retired Wound - Properties Group Date first assessed: 06/07/22  -LV Time first assessed: 0414  -LV Side: Right  -LV Location: gluteal  -LV     Row Name             Wound 06/10/22 1530 Right hip Incision    Wound - Properties Group Placement Date: 06/10/22  -TANIKA Placement Time: 1530  -TANIKA Present on Hospital Admission: Y  -TANIKA Side: Right  -TANIKA Location: hip  -TANIKA Primary Wound Type: Incision  -TANIKA     Retired Wound - Properties Group Placement Date: 06/10/22  -TANIKA Placement Time: 1530  -TANIKA Present on Hospital Admission: Y  -TANIKA Side: Right  -TANIKA Location: hip  -TANIKA Primary Wound Type: Incision  -TANIKA     Retired Wound - Properties Group Date first assessed: 06/10/22  -TANIKA Time first assessed: 1530  -TANIKA Present on Hospital Admission: Y  -TANIKA Side: Right  -TANIKA Location: hip  -TANIKA Primary Wound Type: Incision  -TANIKA     Row Name             NPWT (Negative Pressure Wound Therapy) 06/06/22 1000 R hip    NPWT (Negative Pressure Wound Therapy) - Properties Group Placement Date: 06/06/22  -MS Placement Time: 1000  -MS Location: R hip  -MS     Retired NPWT (Negative Pressure Wound Therapy) - Properties Group Placement Date: 06/06/22  -MS Placement Time: 1000  -MS Location: R hip  -MS     Retired NPWT  (Negative Pressure Wound Therapy) - Properties Group Placement Date: 06/06/22  -MS Placement Time: 1000  -MS Location: R hip  -MS     Row Name 06/14/22 1025          Positioning and Restraints    Pre-Treatment Position in bed  -KJ     Post Treatment Position wheelchair  -KJ           User Key  (r) = Recorded By, (t) = Taken By, (c) = Cosigned By    Initials Name Provider Type    KJ Emilee Paul, PTA Physical Therapist Assistant    MS Nicole Olson, PT, DPT, NCS Physical Therapist    Ranjana Sin, RN Registered Nurse    Ewelina Eubanks RN Registered Nurse    Leeann Younger RN Registered Nurse    Benjie Melendez, RN Registered Nurse    Consuelo Rodríguez RN Registered Nurse                Physical Therapy Education                 Title: PT OT SLP Therapies (In Progress)     Topic: Physical Therapy (In Progress)     Point: Mobility training (Done)     Learning Progress Summary           Patient Eager, E, VU by AE at 6/13/2022 1303    Comment: POC    Nonacceptance, E, NR by AB at 6/11/2022 0842                   Point: Home exercise program (Done)     Learning Progress Summary           Patient Eager, E, VU by AE at 6/13/2022 1303    Comment: POC    Nonacceptance, E, NR by AB at 6/11/2022 0842                   Point: Body mechanics (In Progress)     Learning Progress Summary           Patient Nonacceptance, E, NR by AB at 6/11/2022 0842                   Point: Precautions (In Progress)     Learning Progress Summary           Patient Nonacceptance, E, NR by AB at 6/11/2022 0842    Acceptance, E, VU by MS at 6/6/2022 1138    Comment: wound vac role in pt care and healing                               User Key     Initials Effective Dates Name Provider Type Discipline    AB 08/02/16 -  Kasie Perkins, PTA Physical Therapist Assistant PT    AE 06/22/15 -  Rosemary Guardado, PTA Physical Therapist Assistant PT    MS 06/19/18 -  Nicole Olson, PT, DPT, NCS Physical Therapist PT               PT Recommendation and Plan     Plan of Care Reviewed With: patient  Progress: improving  Outcome Evaluation: PT tx completed. Pt alert and oriented. Feels better today she reports. Minimal pn RLE/hip region. Rolling L<>R Mod, sup<>sit Mod/MaxA x 2, static sitting balance improved to CG. Performed sliding board t-marvin to w/c MaxA of 2. Pt eager to sit up. Worked on w/c mobility in room. Recommend SNF.   Outcome Measures     Row Name 06/13/22 1300 06/12/22 0749          How much help from another person do you currently need...    Turning from your back to your side while in flat bed without using bedrails? 3  -AE 3  -AB     Moving from lying on back to sitting on the side of a flat bed without bedrails? 2  -AE 1  -AB     Moving to and from a bed to a chair (including a wheelchair)? 1  -AE 1  -AB     Standing up from a chair using your arms (e.g., wheelchair, bedside chair)? 1  -AE 1  -AB     Climbing 3-5 steps with a railing? 1  -AE 1  -AB     To walk in hospital room? 1  -AE 1  -AB     AM-PAC 6 Clicks Score (PT) 9  -AE 8  -AB            Functional Assessment    Outcome Measure Options -- AM-PAC 6 Clicks Basic Mobility (PT)  -AB           User Key  (r) = Recorded By, (t) = Taken By, (c) = Cosigned By    Initials Name Provider Type    AB Kasie Perkins, PTA Physical Therapist Assistant    Rosemary Skaggs, SERENA Physical Therapist Assistant                 Time Calculation:    PT Charges     Row Name 06/14/22 1109             Time Calculation    Start Time 1025  -KJ      Stop Time 1104  -KJ      Time Calculation (min) 39 min  -KJ      PT Received On 06/14/22  -KJ      PT Goal Re-Cert Due Date 06/17/22  -KJ              Time Calculation- PT    Total Timed Code Minutes- PT 39 minute(s)  -KJ            User Key  (r) = Recorded By, (t) = Taken By, (c) = Cosigned By    Initials Name Provider Type    Emilee Gloria, PTA Physical Therapist Assistant              Therapy Charges for Today     Code Description Service  Date Service Provider Modifiers Qty    [id number]  PT THERAPEUTIC ACT EA 15 MIN 6/14/2022 Emilee Paul, PTA GP 3          PT G-Codes  Outcome Measure Options: AM-PAC 6 Clicks Basic Mobility (PT)  AM-PAC 6 Clicks Score (PT): 9    Emilee Paul PTA  6/14/2022

## 2022-06-14 NOTE — PROGRESS NOTES
[facility]   Progress Note    Patient Name: Tawny Shin  : 1955  MRN: [id number]  Primary Care Physician:  Leta Julian DO  Date of admission: 6/3/2022    Subjective   Subjective     Chief Complaint: Right hip abscess with wound debridement and irrigation postoperative day 5    History of Present Illness  Patient Reports she is feeling a lot better today. She is alert and oriented x3. She reports that her pain is controlled overall. Last dressing change was last night close to 6 pm. Completed dressing change at 7:45 am.       Objective   Objective     Vitals:   Temp:  [97.7 °F (36.5 °C)-98.1 °F (36.7 °C)] 97.8 °F (36.6 °C)  Heart Rate:  [61-70] 63  Resp:  [16] 16  BP: (134-210)/(49-84) 142/49    Physical Exam:     Extremity: Right hip wound bed with new granulation tissue throughout. No sign of exudate. No odor. Surrounding skin pink, with brisk capillary refill. No obvious signs of infection.    Result Review    Result Review:  I have personally reviewed the results from the time of this admission to 2022 11:30 CDT and agree with these findings:  []  Laboratory  []  Microbiology  []  Radiology  []  EKG/Telemetry   []  Cardiology/Vascular   []  Pathology  []  Old records  []  Other:  Most notable findings include: Wound bed with new granulation tissue scattered throughout the wound bed.    Assessment & Plan   Assessment / Plan     Brief Patient Summary:  Tawny Shin is a 66 y.o. female who present to hospital with right hip infection with open wound from prior irrigation and debridements. Patient returned to the OR 6/10/22 for irrigation and debridement by Dr. Turner after presenting the morning off with change in mental status and concerns for worsening infection as causative agent.     Active Hospital Problems:  Active Hospital Problems    Diagnosis    • **Abscess, right hip    • Encephalopathy, toxic    • Cholelithiasis    • Anemia of chronic disease    • Pressure injury of  skin of heel    • Chronic pain    • Chronic indwelling Horan catheter    • Abscess of right hip      Added automatically from request for surgery [id number]     • Essential hypertension    • Rheumatoid arthritis involving multiple sites (HCC)    • Functional neurological symptom disorder with weakness or paralysis    • Chronic anticoagulation    • Chronic embolism and thrombosis of unspecified deep veins of left lower extremity (HCC)        DVT prophylaxis:  Medical and mechanical DVT prophylaxis orders are present.    CODE STATUS:    Medical Intervention Limits: NO intubation (DNI)  Level Of Support Discussed With: Patient  Code Status (Patient has no pulse and is not breathing): No CPR (Do Not Attempt to Resuscitate)  Medical Interventions (Patient has pulse or is breathing): Limited Support      Plan:   1. Continue wound care protocol.          Electronically signed by DANIELA Jorge, 06/14/22, 11:30 AM CDT.

## 2022-06-14 NOTE — CASE MANAGEMENT/SOCIAL WORK
Called Chelsea's phone from [phone] and was told Chelsea is currently down to see this pt at present time. Will follow for decision.

## 2022-06-14 NOTE — CONSULTS
Patient or patient's representative gives informed consent after an explanation of the risks (air embolism; catheter occlusion; phlebitis; catheter infection; bloodstream infection; vein thrombosis; hematoma/bleeding at the insertion site; slight discomfort; accidental puncture of an artery, nerve, or tendon; dislodging of device), benefits (longer dwell time, outpatient treatment, medications that cannot run peripherally), and alternatives (short peripheral catheter, centrally inserted central line, or permanent catheter) of PICC placement. Time provided to ask and answer questions.    Pt had 4 [place] single lumen PICC placed in left basilic vein. Pt tolerated procedure well. 45 cm of catheter internal and 0 cm external. Tip confirmation by sherlock 3CG. All lumens flush and draw well. Sterile dressing applied.

## 2022-06-15 LAB
ALBUMIN SERPL-MCNC: 2.4 G/DL (ref 3.5–5.2)
ALBUMIN/GLOB SERPL: 0.5 G/DL
ALP SERPL-CCNC: 95 U/L (ref 39–117)
ALT SERPL W P-5'-P-CCNC: <5 U/L (ref 1–33)
ANION GAP SERPL CALCULATED.3IONS-SCNC: 5 MMOL/L (ref 5–15)
ANISOCYTOSIS BLD QL: ABNORMAL
AST SERPL-CCNC: 25 U/L (ref 1–32)
BASOPHILS # BLD MANUAL: 0.09 10*3/MM3 (ref 0–0.2)
BASOPHILS NFR BLD MANUAL: 2 % (ref 0–1.5)
BILIRUB SERPL-MCNC: 0.2 MG/DL (ref 0–1.2)
BUN SERPL-MCNC: 11 MG/DL (ref 8–23)
BUN/CREAT SERPL: 17.7 (ref 7–25)
CALCIUM SPEC-SCNC: 8.4 MG/DL (ref 8.6–10.5)
CHLORIDE SERPL-SCNC: 107 MMOL/L (ref 98–107)
CO2 SERPL-SCNC: 27 MMOL/L (ref 22–29)
CREAT SERPL-MCNC: 0.62 MG/DL (ref 0.57–1)
DEPRECATED RDW RBC AUTO: 52.4 FL (ref 37–54)
EGFRCR SERPLBLD CKD-EPI 2021: 97.1 ML/MIN/1.73
EOSINOPHIL # BLD MANUAL: 0.36 10*3/MM3 (ref 0–0.4)
EOSINOPHIL NFR BLD MANUAL: 8.2 % (ref 0.3–6.2)
ERYTHROCYTE [DISTWIDTH] IN BLOOD BY AUTOMATED COUNT: 16.3 % (ref 12.3–15.4)
GIANT PLATELETS: ABNORMAL
GLOBULIN UR ELPH-MCNC: 4.4 GM/DL
GLUCOSE SERPL-MCNC: 101 MG/DL (ref 65–99)
HCT VFR BLD AUTO: 26.3 % (ref 34–46.6)
HGB BLD-MCNC: 7.8 G/DL (ref 12–15.9)
HYPOCHROMIA BLD QL: ABNORMAL
LYMPHOCYTES # BLD MANUAL: 1.03 10*3/MM3 (ref 0.7–3.1)
LYMPHOCYTES NFR BLD MANUAL: 4.1 % (ref 5–12)
MCH RBC QN AUTO: 26.2 PG (ref 26.6–33)
MCHC RBC AUTO-ENTMCNC: 29.7 G/DL (ref 31.5–35.7)
MCV RBC AUTO: 88.3 FL (ref 79–97)
MICROCYTES BLD QL: ABNORMAL
MONOCYTES # BLD: 0.18 10*3/MM3 (ref 0.1–0.9)
NEUTROPHILS # BLD AUTO: 2.74 10*3/MM3 (ref 1.7–7)
NEUTROPHILS NFR BLD MANUAL: 62.2 % (ref 42.7–76)
PLATELET # BLD AUTO: 185 10*3/MM3 (ref 140–450)
PMV BLD AUTO: 10 FL (ref 6–12)
POLYCHROMASIA BLD QL SMEAR: ABNORMAL
POTASSIUM SERPL-SCNC: 4 MMOL/L (ref 3.5–5.2)
PREALB SERPL-MCNC: 8.2 MG/DL (ref 20–40)
PROT SERPL-MCNC: 6.8 G/DL (ref 6–8.5)
RBC # BLD AUTO: 2.98 10*6/MM3 (ref 3.77–5.28)
SMALL PLATELETS BLD QL SMEAR: ADEQUATE
SODIUM SERPL-SCNC: 139 MMOL/L (ref 136–145)
VARIANT LYMPHS NFR BLD MANUAL: 2 % (ref 0–5)
VARIANT LYMPHS NFR BLD MANUAL: 21.4 % (ref 19.6–45.3)
WBC MORPH BLD: NORMAL
WBC NRBC COR # BLD: 4.41 10*3/MM3 (ref 3.4–10.8)

## 2022-06-15 PROCEDURE — 85007 BL SMEAR W/DIFF WBC COUNT: CPT | Performed by: INTERNAL MEDICINE

## 2022-06-15 PROCEDURE — 80053 COMPREHEN METABOLIC PANEL: CPT | Performed by: INTERNAL MEDICINE

## 2022-06-15 PROCEDURE — 85025 COMPLETE CBC W/AUTO DIFF WBC: CPT | Performed by: INTERNAL MEDICINE

## 2022-06-15 PROCEDURE — 97166 OT EVAL MOD COMPLEX 45 MIN: CPT

## 2022-06-15 PROCEDURE — 25010000002 ENOXAPARIN PER 10 MG: Performed by: INTERNAL MEDICINE

## 2022-06-15 PROCEDURE — 97162 PT EVAL MOD COMPLEX 30 MIN: CPT

## 2022-06-15 PROCEDURE — 25010000002 CEFAZOLIN PER 500 MG: Performed by: INTERNAL MEDICINE

## 2022-06-15 PROCEDURE — 84134 ASSAY OF PREALBUMIN: CPT | Performed by: INTERNAL MEDICINE

## 2022-06-15 RX ORDER — LISINOPRIL 5 MG/1
5 TABLET ORAL
Status: DISCONTINUED | OUTPATIENT
Start: 2022-06-15 | End: 2022-06-23

## 2022-06-15 NOTE — THERAPY DISCHARGE NOTE
Acute Care - Physical Therapy Discharge Summary  [facility]       Patient Name: Tawny Shin  : 1955  MRN: [id number]    Today's Date: 6/15/2022                 Admit Date: 6/3/2022      PT Recommendation and Plan    Visit Dx:    ICD-10-CM ICD-9-CM   1. Abscess of right hip  L02.415 682.6   2. Impaired mobility  Z74.09 799.89   3. Abscess of hip, right  L02.415 682.6   4. Other dysphagia  R13.19 787.29   5. Short-term memory loss  R41.3 780.93        Outcome Measures     Row Name 22 1100 22 1300 22 0749       How much help from another person do you currently need...    Turning from your back to your side while in flat bed without using bedrails? 3  -KJ 3  -AE 3  -AB    Moving from lying on back to sitting on the side of a flat bed without bedrails? 2  -KJ 2  -AE 1  -AB    Moving to and from a bed to a chair (including a wheelchair)? 1  -KJ 1  -AE 1  -AB    Standing up from a chair using your arms (e.g., wheelchair, bedside chair)? 1  -KJ 1  -AE 1  -AB    Climbing 3-5 steps with a railing? 1  -KJ 1  -AE 1  -AB    To walk in hospital room? 1  -KJ 1  -AE 1  -AB    AM-PAC 6 Clicks Score (PT) 9  -KJ 9  -AE 8  -AB       Functional Assessment    Outcome Measure Options AM-PAC 6 Clicks Basic Mobility (PT)  -KJ -- AM-PAC 6 Clicks Basic Mobility (PT)  -AB          User Key  (r) = Recorded By, (t) = Taken By, (c) = Cosigned By    Initials Name Provider Type    AB Kasie Perkins, PTA Physical Therapist Assistant    Rosemary Skaggs, PTA Physical Therapist Assistant    Emilee Gloria, SERENA Physical Therapist Assistant                     PT Rehab Goals     Row Name 06/15/22 0716             Bed Mobility Goal 1 (PT)    Activity/Assistive Device (Bed Mobility Goal 1, PT) rolling to right;bed rails  -AB      Knott Level/Cues Needed (Bed Mobility Goal 1, PT) minimum assist (75% or more patient effort);tactile cues required;verbal cues required  -AB      Time Frame (Bed Mobility Goal 1,  PT) long term goal (LTG);by discharge  -AB      Progress/Outcomes (Bed Mobility Goal 1, PT) goal not met  -AB              ROM Goal 1 (PT)    ROM Goal 1 (PT) pt will demonstrate AROM  through 50% of the range in all joints of her R LE  -AB      Time Frame (ROM Goal 1, PT) long-term goal (LTG);by discharge  -AB      Progress/Outcome (ROM Goal 1, PT) goal not met  -AB              Wound Care Goal 1 (PT)    Wound Care Goal 1 (PT) The R hip wound will decrease in length by 1cm from 5.2 cm to 4.2 cm.  -AB      Time Frame (Wound Care Goal 1, PT) long term goal (LTG);by discharge  -AB      Progress/Outcome (Wound Care Goal 1, PT) goal not met  -AB              Wound Care Goal 2 (PT)    Wound Care Goal 2 (PT) The R hip wound will decrease in depth by 2 cm from 7.3cm to 5.3cm.  -AB      Time Frame (Wound Care Goal 2, PT) long term goal (LTG);by discharge  -AB      Progress/Outcome (Wound Care Goal 2, PT) goal not met  -AB              Problem Specific Goal 1 (PT)    Problem Specific Goal 1 (PT) pt will demonstrate ability to roll to the R with mod A and use of bedrail  -AB      Time Frame (Problem Specific Goal 1, PT) long-term goal (LTG);by discharge  -AB      Progress/Outcome (Problem Specific Goal 1, PT) goal met  -AB            User Key  (r) = Recorded By, (t) = Taken By, (c) = Cosigned By    Initials Name Provider Type Discipline    Kasie Carlos PTA Physical Therapist Assistant PT                    PT Discharge Summary  Anticipated Discharge Disposition (PT): skilled nursing facility  Reason for Discharge: Discharge from facility  Outcomes Achieved: Refer to plan of care for updates on goals achieved  Discharge Destination: LTACH      Kasie Perkins PTA   6/15/2022

## 2022-06-16 ENCOUNTER — TRANSITIONAL CARE MANAGEMENT TELEPHONE ENCOUNTER (OUTPATIENT)
Dept: CALL CENTER | Facility: HOSPITAL | Age: 67
End: 2022-06-16

## 2022-06-16 ENCOUNTER — READMISSION MANAGEMENT (OUTPATIENT)
Dept: CALL CENTER | Facility: HOSPITAL | Age: 67
End: 2022-06-16

## 2022-06-16 LAB
ALBUMIN SERPL-MCNC: 2.6 G/DL (ref 3.5–5.2)
ALBUMIN/GLOB SERPL: 0.6 G/DL
ALP SERPL-CCNC: 110 U/L (ref 39–117)
ALT SERPL W P-5'-P-CCNC: <5 U/L (ref 1–33)
ANION GAP SERPL CALCULATED.3IONS-SCNC: 6 MMOL/L (ref 5–15)
AST SERPL-CCNC: 21 U/L (ref 1–32)
BASOPHILS # BLD AUTO: 0.02 10*3/MM3 (ref 0–0.2)
BASOPHILS NFR BLD AUTO: 0.5 % (ref 0–1.5)
BILIRUB SERPL-MCNC: 0.2 MG/DL (ref 0–1.2)
BUN SERPL-MCNC: 8 MG/DL (ref 8–23)
BUN/CREAT SERPL: 12.7 (ref 7–25)
CALCIUM SPEC-SCNC: 8.9 MG/DL (ref 8.6–10.5)
CHLORIDE SERPL-SCNC: 107 MMOL/L (ref 98–107)
CO2 SERPL-SCNC: 27 MMOL/L (ref 22–29)
CREAT SERPL-MCNC: 0.63 MG/DL (ref 0.57–1)
CRP SERPL-MCNC: 2.36 MG/DL (ref 0–0.5)
DEPRECATED RDW RBC AUTO: 53.8 FL (ref 37–54)
EGFRCR SERPLBLD CKD-EPI 2021: 96.8 ML/MIN/1.73
EOSINOPHIL # BLD AUTO: 0.25 10*3/MM3 (ref 0–0.4)
EOSINOPHIL NFR BLD AUTO: 6 % (ref 0.3–6.2)
ERYTHROCYTE [DISTWIDTH] IN BLOOD BY AUTOMATED COUNT: 16.6 % (ref 12.3–15.4)
ERYTHROCYTE [SEDIMENTATION RATE] IN BLOOD: 68 MM/HR (ref 0–30)
GLOBULIN UR ELPH-MCNC: 4.6 GM/DL
GLUCOSE SERPL-MCNC: 117 MG/DL (ref 65–99)
HCT VFR BLD AUTO: 27.2 % (ref 34–46.6)
HGB BLD-MCNC: 8 G/DL (ref 12–15.9)
IMM GRANULOCYTES # BLD AUTO: 0.03 10*3/MM3 (ref 0–0.05)
IMM GRANULOCYTES NFR BLD AUTO: 0.7 % (ref 0–0.5)
LYMPHOCYTES # BLD AUTO: 1.06 10*3/MM3 (ref 0.7–3.1)
LYMPHOCYTES NFR BLD AUTO: 25.2 % (ref 19.6–45.3)
MCH RBC QN AUTO: 26.2 PG (ref 26.6–33)
MCHC RBC AUTO-ENTMCNC: 29.4 G/DL (ref 31.5–35.7)
MCV RBC AUTO: 89.2 FL (ref 79–97)
MONOCYTES # BLD AUTO: 0.37 10*3/MM3 (ref 0.1–0.9)
MONOCYTES NFR BLD AUTO: 8.8 % (ref 5–12)
NEUTROPHILS NFR BLD AUTO: 2.47 10*3/MM3 (ref 1.7–7)
NEUTROPHILS NFR BLD AUTO: 58.8 % (ref 42.7–76)
NRBC BLD AUTO-RTO: 0 /100 WBC (ref 0–0.2)
PLATELET # BLD AUTO: 197 10*3/MM3 (ref 140–450)
PMV BLD AUTO: 10 FL (ref 6–12)
POTASSIUM SERPL-SCNC: 4.2 MMOL/L (ref 3.5–5.2)
PROT SERPL-MCNC: 7.2 G/DL (ref 6–8.5)
RBC # BLD AUTO: 3.05 10*6/MM3 (ref 3.77–5.28)
SODIUM SERPL-SCNC: 140 MMOL/L (ref 136–145)
WBC NRBC COR # BLD: 4.2 10*3/MM3 (ref 3.4–10.8)

## 2022-06-16 PROCEDURE — 80053 COMPREHEN METABOLIC PANEL: CPT | Performed by: INTERNAL MEDICINE

## 2022-06-16 PROCEDURE — 85652 RBC SED RATE AUTOMATED: CPT | Performed by: INTERNAL MEDICINE

## 2022-06-16 PROCEDURE — 85025 COMPLETE CBC W/AUTO DIFF WBC: CPT | Performed by: INTERNAL MEDICINE

## 2022-06-16 PROCEDURE — 86140 C-REACTIVE PROTEIN: CPT | Performed by: INTERNAL MEDICINE

## 2022-06-16 PROCEDURE — 97530 THERAPEUTIC ACTIVITIES: CPT

## 2022-06-16 PROCEDURE — 25010000002 CEFAZOLIN PER 500 MG: Performed by: INTERNAL MEDICINE

## 2022-06-16 PROCEDURE — 97535 SELF CARE MNGMENT TRAINING: CPT

## 2022-06-16 PROCEDURE — 25010000002 METHYLNALTREXONE 12 MG/0.6ML SOLUTION: Performed by: INTERNAL MEDICINE

## 2022-06-16 RX ORDER — POLYETHYLENE GLYCOL 3350 17 G/17G
17 POWDER, FOR SOLUTION ORAL 2 TIMES DAILY
Status: DISCONTINUED | OUTPATIENT
Start: 2022-06-16 | End: 2022-07-06 | Stop reason: HOSPADM

## 2022-06-16 NOTE — THERAPY DISCHARGE NOTE
Acute Care - Speech Language Pathology Discharge Summary  [facility]       Patient Name: Tawny Shin  : 1955  MRN: [id number]    Today's Date: 2022                   Admit Date: 6/3/2022      SLP Recommendation and Plan  Regular diet with thin liquids. Pt would benefit from continued SLP services at the next level of care for short term memory deficits. Thanks!   Nelli Jackson CCC-SLP 2022 15:59 CDT    Visit Dx:    ICD-10-CM ICD-9-CM   1. Abscess of right hip  L02.415 682.6   2. Impaired mobility  Z74.09 799.89   3. Abscess of hip, right  L02.415 682.6   4. Other dysphagia  R13.19 787.29   5. Short-term memory loss  R41.3 780.93                SLP GOALS     Row Name 22 1557 22 1500 22 1445       Oral Nutrition/Hydration Goal 1 (SLP)    Oral Nutrition/Hydration Goal 1, SLP -- LTG: Patient will tolerate LRD without s/s of aspiration.  -TM --    Time Frame (Oral Nutrition/Hydration Goal 1, SLP) -- by discharge  -TM --    Barriers (Oral Nutrition/Hydration Goal 1, SLP) -- cognition  -TM --    Progress/Outcomes (Oral Nutrition/Hydration Goal 1, SLP) -- goal met;discharged from facility  -TM --       Memory Skills Goal 1 (SLP)    Improve Memory Skills Through Goal 1 (SLP) select a word from a list by exclusion;repeat list in sequential order;visual memory task;use memory strategies;80%  -TM -- select a word from a list by exclusion;repeat list in sequential order;visual memory task;use memory strategies;80%  -KW    Time Frame (Memory Skills Goal 1, SLP) short term goal (STG);by discharge  -TM -- short term goal (STG);by discharge  -KW    Barriers (Memory Skills Goal 1, SLP) cognitive  -TM -- cognitive  -KW    Progress/Outcomes (Memory Skills Goal 1, SLP) goal not met;discharged from facility  -TM -- goal ongoing  -KW          User Key  (r) = Recorded By, (t) = Taken By, (c) = Cosigned By    Initials Name Provider Type    TM Nelli Jackson CCC-SLP Speech and Language  Pathologist    Miranda Pozo MS CCC-SLP Speech and Language Pathologist                        SLP Discharge Summary  Anticipated Discharge Disposition (SLP): extended care facility  Reason for Discharge: discharge from this facility  Progress Toward Achieving Short/long Term Goals: goals partially met within established timelines  Discharge Destination: extended care facility      Nelli Jackson, CCC-SLP  6/16/2022

## 2022-06-16 NOTE — OUTREACH NOTE
Call Center TCM Note    Flowsheet Row Responses   [facility] patient discharged from? Non-BH   Does the patient have one of the following disease processes/diagnoses(primary or secondary)? Other   TCM attempt successful? No   Revoked Reason Other   Change in Health Status Readmitted          Juanis Garcia LPN    6/16/2022, 07:56 EDT

## 2022-06-16 NOTE — OUTREACH NOTE
Prep Survey    Flowsheet Row Responses   [facility]   Is LACE score < 7 ? Non-BH Discharge   Emergency Room discharge w/ pulse ox? No   Eligibility [facility]   Date of Discharge 06/15/22   Discharge diagnosis Unavailable   Does the patient have one of the following disease processes/diagnoses(primary or secondary)? Other   Prep survey completed? Yes          BERNARDINO VELASCO - Registered Nurse

## 2022-06-17 ENCOUNTER — APPOINTMENT (OUTPATIENT)
Dept: ULTRASOUND IMAGING | Facility: HOSPITAL | Age: 67
End: 2022-06-17

## 2022-06-17 LAB
BACTERIA UR QL AUTO: ABNORMAL /HPF
BILIRUB UR QL STRIP: NEGATIVE
CLARITY UR: CLEAR
COLOR UR: YELLOW
GLUCOSE UR STRIP-MCNC: NEGATIVE MG/DL
HGB UR QL STRIP.AUTO: NEGATIVE
HYALINE CASTS UR QL AUTO: ABNORMAL /LPF
KETONES UR QL STRIP: NEGATIVE
LEUKOCYTE ESTERASE UR QL STRIP.AUTO: ABNORMAL
NITRITE UR QL STRIP: NEGATIVE
PH UR STRIP.AUTO: 6 [PH] (ref 5–8)
PROT UR QL STRIP: ABNORMAL
RBC # UR STRIP: ABNORMAL /HPF
REF LAB TEST METHOD: ABNORMAL
SP GR UR STRIP: 1.01 (ref 1–1.03)
SQUAMOUS #/AREA URNS HPF: ABNORMAL /HPF
UROBILINOGEN UR QL STRIP: ABNORMAL
WBC # UR STRIP: ABNORMAL /HPF

## 2022-06-17 PROCEDURE — 97530 THERAPEUTIC ACTIVITIES: CPT

## 2022-06-17 PROCEDURE — 93971 EXTREMITY STUDY: CPT | Performed by: SURGERY

## 2022-06-17 PROCEDURE — 81001 URINALYSIS AUTO W/SCOPE: CPT | Performed by: INTERNAL MEDICINE

## 2022-06-17 PROCEDURE — 97535 SELF CARE MNGMENT TRAINING: CPT

## 2022-06-17 PROCEDURE — 25010000002 CEFAZOLIN PER 500 MG: Performed by: INTERNAL MEDICINE

## 2022-06-17 PROCEDURE — 93971 EXTREMITY STUDY: CPT

## 2022-06-17 PROCEDURE — 87086 URINE CULTURE/COLONY COUNT: CPT | Performed by: INTERNAL MEDICINE

## 2022-06-17 PROCEDURE — 97110 THERAPEUTIC EXERCISES: CPT

## 2022-06-18 LAB — BACTERIA SPEC AEROBE CULT: ABNORMAL

## 2022-06-18 PROCEDURE — 25010000002 CEFAZOLIN PER 500 MG: Performed by: INTERNAL MEDICINE

## 2022-06-18 PROCEDURE — 97530 THERAPEUTIC ACTIVITIES: CPT

## 2022-06-18 PROCEDURE — 97110 THERAPEUTIC EXERCISES: CPT

## 2022-06-18 PROCEDURE — 25010000002 METHYLNALTREXONE 12 MG/0.6ML SOLUTION: Performed by: INTERNAL MEDICINE

## 2022-06-19 LAB — BACTERIA ISLT: NORMAL

## 2022-06-19 PROCEDURE — 25010000002 CEFAZOLIN PER 500 MG: Performed by: INTERNAL MEDICINE

## 2022-06-20 LAB
ANION GAP SERPL CALCULATED.3IONS-SCNC: 3 MMOL/L (ref 5–15)
BASOPHILS # BLD AUTO: 0.03 10*3/MM3 (ref 0–0.2)
BASOPHILS NFR BLD AUTO: 0.8 % (ref 0–1.5)
BUN SERPL-MCNC: 11 MG/DL (ref 8–23)
BUN/CREAT SERPL: 16.9 (ref 7–25)
CALCIUM SPEC-SCNC: 8.4 MG/DL (ref 8.6–10.5)
CHLORIDE SERPL-SCNC: 108 MMOL/L (ref 98–107)
CO2 SERPL-SCNC: 29 MMOL/L (ref 22–29)
CREAT SERPL-MCNC: 0.65 MG/DL (ref 0.57–1)
CRP SERPL-MCNC: 6.57 MG/DL (ref 0–0.5)
DEPRECATED RDW RBC AUTO: 54.2 FL (ref 37–54)
EGFRCR SERPLBLD CKD-EPI 2021: 96 ML/MIN/1.73
EOSINOPHIL # BLD AUTO: 0.29 10*3/MM3 (ref 0–0.4)
EOSINOPHIL NFR BLD AUTO: 7.9 % (ref 0.3–6.2)
ERYTHROCYTE [DISTWIDTH] IN BLOOD BY AUTOMATED COUNT: 17.2 % (ref 12.3–15.4)
ERYTHROCYTE [SEDIMENTATION RATE] IN BLOOD: 51 MM/HR (ref 0–30)
GLUCOSE BLDC GLUCOMTR-MCNC: 103 MG/DL (ref 70–130)
GLUCOSE BLDC GLUCOMTR-MCNC: 146 MG/DL (ref 70–130)
GLUCOSE SERPL-MCNC: 101 MG/DL (ref 65–99)
HCT VFR BLD AUTO: 24.1 % (ref 34–46.6)
HGB BLD-MCNC: 7.1 G/DL (ref 12–15.9)
IMM GRANULOCYTES # BLD AUTO: 0.03 10*3/MM3 (ref 0–0.05)
IMM GRANULOCYTES NFR BLD AUTO: 0.8 % (ref 0–0.5)
LYMPHOCYTES # BLD AUTO: 1.21 10*3/MM3 (ref 0.7–3.1)
LYMPHOCYTES NFR BLD AUTO: 32.9 % (ref 19.6–45.3)
MCH RBC QN AUTO: 25.9 PG (ref 26.6–33)
MCHC RBC AUTO-ENTMCNC: 29.5 G/DL (ref 31.5–35.7)
MCV RBC AUTO: 88 FL (ref 79–97)
MONOCYTES # BLD AUTO: 0.51 10*3/MM3 (ref 0.1–0.9)
MONOCYTES NFR BLD AUTO: 13.9 % (ref 5–12)
NEUTROPHILS NFR BLD AUTO: 1.61 10*3/MM3 (ref 1.7–7)
NEUTROPHILS NFR BLD AUTO: 43.7 % (ref 42.7–76)
NRBC BLD AUTO-RTO: 0 /100 WBC (ref 0–0.2)
PLATELET # BLD AUTO: 218 10*3/MM3 (ref 140–450)
PMV BLD AUTO: 10 FL (ref 6–12)
POTASSIUM SERPL-SCNC: 4.5 MMOL/L (ref 3.5–5.2)
RBC # BLD AUTO: 2.74 10*6/MM3 (ref 3.77–5.28)
SODIUM SERPL-SCNC: 140 MMOL/L (ref 136–145)
WBC NRBC COR # BLD: 3.68 10*3/MM3 (ref 3.4–10.8)

## 2022-06-20 PROCEDURE — 85025 COMPLETE CBC W/AUTO DIFF WBC: CPT | Performed by: INTERNAL MEDICINE

## 2022-06-20 PROCEDURE — 85652 RBC SED RATE AUTOMATED: CPT | Performed by: INTERNAL MEDICINE

## 2022-06-20 PROCEDURE — 25010000002 CEFAZOLIN PER 500 MG: Performed by: INTERNAL MEDICINE

## 2022-06-20 PROCEDURE — 25010000002 METHYLNALTREXONE 12 MG/0.6ML SOLUTION: Performed by: INTERNAL MEDICINE

## 2022-06-20 PROCEDURE — 86140 C-REACTIVE PROTEIN: CPT | Performed by: INTERNAL MEDICINE

## 2022-06-20 PROCEDURE — 97110 THERAPEUTIC EXERCISES: CPT

## 2022-06-20 PROCEDURE — 82962 GLUCOSE BLOOD TEST: CPT

## 2022-06-20 PROCEDURE — 80048 BASIC METABOLIC PNL TOTAL CA: CPT | Performed by: INTERNAL MEDICINE

## 2022-06-21 LAB
BASOPHILS # BLD AUTO: 0.03 10*3/MM3 (ref 0–0.2)
BASOPHILS NFR BLD AUTO: 0.8 % (ref 0–1.5)
DEPRECATED RDW RBC AUTO: 54.5 FL (ref 37–54)
EOSINOPHIL # BLD AUTO: 0.27 10*3/MM3 (ref 0–0.4)
EOSINOPHIL NFR BLD AUTO: 7.2 % (ref 0.3–6.2)
ERYTHROCYTE [DISTWIDTH] IN BLOOD BY AUTOMATED COUNT: 17.2 % (ref 12.3–15.4)
FERRITIN SERPL-MCNC: 219.1 NG/ML (ref 13–150)
FOLATE SERPL-MCNC: >20 NG/ML (ref 4.78–24.2)
GLUCOSE BLDC GLUCOMTR-MCNC: 101 MG/DL (ref 70–130)
HAPTOGLOB SERPL-MCNC: 205 MG/DL (ref 30–200)
HCT VFR BLD AUTO: 23.5 % (ref 34–46.6)
HGB BLD-MCNC: 7.1 G/DL (ref 12–15.9)
IMM GRANULOCYTES # BLD AUTO: 0.01 10*3/MM3 (ref 0–0.05)
IMM GRANULOCYTES NFR BLD AUTO: 0.3 % (ref 0–0.5)
IRON 24H UR-MRATE: 24 MCG/DL (ref 37–145)
IRON SATN MFR SERPL: 15 % (ref 20–50)
LYMPHOCYTES # BLD AUTO: 1.29 10*3/MM3 (ref 0.7–3.1)
LYMPHOCYTES NFR BLD AUTO: 34.4 % (ref 19.6–45.3)
MCH RBC QN AUTO: 26.7 PG (ref 26.6–33)
MCHC RBC AUTO-ENTMCNC: 30.2 G/DL (ref 31.5–35.7)
MCV RBC AUTO: 88.3 FL (ref 79–97)
MONOCYTES # BLD AUTO: 0.49 10*3/MM3 (ref 0.1–0.9)
MONOCYTES NFR BLD AUTO: 13.1 % (ref 5–12)
NEUTROPHILS NFR BLD AUTO: 1.66 10*3/MM3 (ref 1.7–7)
NEUTROPHILS NFR BLD AUTO: 44.2 % (ref 42.7–76)
NRBC BLD AUTO-RTO: 0 /100 WBC (ref 0–0.2)
PLATELET # BLD AUTO: 210 10*3/MM3 (ref 140–450)
PMV BLD AUTO: 10 FL (ref 6–12)
RBC # BLD AUTO: 2.66 10*6/MM3 (ref 3.77–5.28)
RETICS # AUTO: 0.08 10*6/MM3 (ref 0.02–0.13)
RETICS/RBC NFR AUTO: 3.19 % (ref 0.7–1.9)
TIBC SERPL-MCNC: 159 MCG/DL (ref 298–536)
TRANSFERRIN SERPL-MCNC: 107 MG/DL (ref 200–360)
VIT B12 BLD-MCNC: 610 PG/ML (ref 211–946)
WBC NRBC COR # BLD: 3.75 10*3/MM3 (ref 3.4–10.8)

## 2022-06-21 PROCEDURE — 97110 THERAPEUTIC EXERCISES: CPT

## 2022-06-21 PROCEDURE — 97530 THERAPEUTIC ACTIVITIES: CPT

## 2022-06-21 PROCEDURE — 85025 COMPLETE CBC W/AUTO DIFF WBC: CPT | Performed by: INTERNAL MEDICINE

## 2022-06-21 PROCEDURE — 83540 ASSAY OF IRON: CPT | Performed by: INTERNAL MEDICINE

## 2022-06-21 PROCEDURE — 82607 VITAMIN B-12: CPT | Performed by: INTERNAL MEDICINE

## 2022-06-21 PROCEDURE — 25010000002 CEFAZOLIN PER 500 MG: Performed by: INTERNAL MEDICINE

## 2022-06-21 PROCEDURE — 84466 ASSAY OF TRANSFERRIN: CPT | Performed by: INTERNAL MEDICINE

## 2022-06-21 PROCEDURE — 82728 ASSAY OF FERRITIN: CPT | Performed by: INTERNAL MEDICINE

## 2022-06-21 PROCEDURE — 83010 ASSAY OF HAPTOGLOBIN QUANT: CPT | Performed by: INTERNAL MEDICINE

## 2022-06-21 PROCEDURE — 82962 GLUCOSE BLOOD TEST: CPT

## 2022-06-21 PROCEDURE — 85045 AUTOMATED RETICULOCYTE COUNT: CPT | Performed by: INTERNAL MEDICINE

## 2022-06-21 PROCEDURE — 82746 ASSAY OF FOLIC ACID SERUM: CPT | Performed by: INTERNAL MEDICINE

## 2022-06-22 LAB
BACTERIA UR QL AUTO: ABNORMAL /HPF
BILIRUB UR QL STRIP: NEGATIVE
CLARITY UR: CLEAR
COLOR UR: YELLOW
GLUCOSE UR STRIP-MCNC: NEGATIVE MG/DL
HGB UR QL STRIP.AUTO: NEGATIVE
HYALINE CASTS UR QL AUTO: ABNORMAL /LPF
KETONES UR QL STRIP: NEGATIVE
LEUKOCYTE ESTERASE UR QL STRIP.AUTO: ABNORMAL
NITRITE UR QL STRIP: NEGATIVE
PH UR STRIP.AUTO: 6.5 [PH] (ref 5–8)
PROT UR QL STRIP: NEGATIVE
RBC # UR STRIP: ABNORMAL /HPF
REF LAB TEST METHOD: ABNORMAL
SP GR UR STRIP: 1.01 (ref 1–1.03)
SQUAMOUS #/AREA URNS HPF: ABNORMAL /HPF
UROBILINOGEN UR QL STRIP: ABNORMAL
WBC # UR STRIP: ABNORMAL /HPF

## 2022-06-22 PROCEDURE — 97110 THERAPEUTIC EXERCISES: CPT

## 2022-06-22 PROCEDURE — 25010000002 CEFAZOLIN PER 500 MG: Performed by: INTERNAL MEDICINE

## 2022-06-22 PROCEDURE — 81001 URINALYSIS AUTO W/SCOPE: CPT | Performed by: INTERNAL MEDICINE

## 2022-06-22 PROCEDURE — 87077 CULTURE AEROBIC IDENTIFY: CPT | Performed by: INTERNAL MEDICINE

## 2022-06-22 PROCEDURE — 87186 SC STD MICRODIL/AGAR DIL: CPT | Performed by: INTERNAL MEDICINE

## 2022-06-22 PROCEDURE — 87086 URINE CULTURE/COLONY COUNT: CPT | Performed by: INTERNAL MEDICINE

## 2022-06-22 RX ORDER — FLUCONAZOLE 100 MG/1
200 TABLET ORAL
Status: DISPENSED | OUTPATIENT
Start: 2022-06-22 | End: 2022-06-25

## 2022-06-23 LAB
ANION GAP SERPL CALCULATED.3IONS-SCNC: 5 MMOL/L (ref 5–15)
BASOPHILS # BLD AUTO: 0.04 10*3/MM3 (ref 0–0.2)
BASOPHILS NFR BLD AUTO: 1.3 % (ref 0–1.5)
BUN SERPL-MCNC: 7 MG/DL (ref 8–23)
BUN/CREAT SERPL: 10.6 (ref 7–25)
CALCIUM SPEC-SCNC: 9.1 MG/DL (ref 8.6–10.5)
CHLORIDE SERPL-SCNC: 102 MMOL/L (ref 98–107)
CO2 SERPL-SCNC: 32 MMOL/L (ref 22–29)
CREAT SERPL-MCNC: 0.66 MG/DL (ref 0.57–1)
CRP SERPL-MCNC: 2.86 MG/DL (ref 0–0.5)
DEPRECATED RDW RBC AUTO: 53.9 FL (ref 37–54)
EGFRCR SERPLBLD CKD-EPI 2021: 95.7 ML/MIN/1.73
EOSINOPHIL # BLD AUTO: 0.19 10*3/MM3 (ref 0–0.4)
EOSINOPHIL NFR BLD AUTO: 6.2 % (ref 0.3–6.2)
ERYTHROCYTE [DISTWIDTH] IN BLOOD BY AUTOMATED COUNT: 16.9 % (ref 12.3–15.4)
ERYTHROCYTE [SEDIMENTATION RATE] IN BLOOD: 60 MM/HR (ref 0–30)
GLUCOSE SERPL-MCNC: 107 MG/DL (ref 65–99)
HCT VFR BLD AUTO: 25.9 % (ref 34–46.6)
HGB BLD-MCNC: 7.7 G/DL (ref 12–15.9)
IMM GRANULOCYTES # BLD AUTO: 0.01 10*3/MM3 (ref 0–0.05)
IMM GRANULOCYTES NFR BLD AUTO: 0.3 % (ref 0–0.5)
LYMPHOCYTES # BLD AUTO: 0.95 10*3/MM3 (ref 0.7–3.1)
LYMPHOCYTES NFR BLD AUTO: 31.1 % (ref 19.6–45.3)
MCH RBC QN AUTO: 26 PG (ref 26.6–33)
MCHC RBC AUTO-ENTMCNC: 29.7 G/DL (ref 31.5–35.7)
MCV RBC AUTO: 87.5 FL (ref 79–97)
MONOCYTES # BLD AUTO: 0.29 10*3/MM3 (ref 0.1–0.9)
MONOCYTES NFR BLD AUTO: 9.5 % (ref 5–12)
NEUTROPHILS NFR BLD AUTO: 1.57 10*3/MM3 (ref 1.7–7)
NEUTROPHILS NFR BLD AUTO: 51.6 % (ref 42.7–76)
NRBC BLD AUTO-RTO: 0 /100 WBC (ref 0–0.2)
PLATELET # BLD AUTO: 236 10*3/MM3 (ref 140–450)
PMV BLD AUTO: 9.5 FL (ref 6–12)
POTASSIUM SERPL-SCNC: 3.9 MMOL/L (ref 3.5–5.2)
RBC # BLD AUTO: 2.96 10*6/MM3 (ref 3.77–5.28)
SODIUM SERPL-SCNC: 139 MMOL/L (ref 136–145)
WBC NRBC COR # BLD: 3.05 10*3/MM3 (ref 3.4–10.8)

## 2022-06-23 PROCEDURE — 85025 COMPLETE CBC W/AUTO DIFF WBC: CPT | Performed by: INTERNAL MEDICINE

## 2022-06-23 PROCEDURE — 85652 RBC SED RATE AUTOMATED: CPT | Performed by: INTERNAL MEDICINE

## 2022-06-23 PROCEDURE — 97530 THERAPEUTIC ACTIVITIES: CPT

## 2022-06-23 PROCEDURE — 86140 C-REACTIVE PROTEIN: CPT | Performed by: INTERNAL MEDICINE

## 2022-06-23 PROCEDURE — 80048 BASIC METABOLIC PNL TOTAL CA: CPT | Performed by: INTERNAL MEDICINE

## 2022-06-23 PROCEDURE — 97110 THERAPEUTIC EXERCISES: CPT

## 2022-06-23 PROCEDURE — 25010000002 CEFAZOLIN PER 500 MG: Performed by: INTERNAL MEDICINE

## 2022-06-23 RX ORDER — LISINOPRIL 5 MG/1
10 TABLET ORAL
Status: DISCONTINUED | OUTPATIENT
Start: 2022-06-24 | End: 2022-07-06 | Stop reason: HOSPADM

## 2022-06-23 RX ORDER — LISINOPRIL 5 MG/1
5 TABLET ORAL
Status: DISPENSED | OUTPATIENT
Start: 2022-06-23 | End: 2022-06-23

## 2022-06-24 PROCEDURE — 97535 SELF CARE MNGMENT TRAINING: CPT

## 2022-06-24 PROCEDURE — 97530 THERAPEUTIC ACTIVITIES: CPT

## 2022-06-24 PROCEDURE — 97110 THERAPEUTIC EXERCISES: CPT

## 2022-06-24 PROCEDURE — 25010000002 HYDROMORPHONE 1 MG/ML SOLUTION: Performed by: INTERNAL MEDICINE

## 2022-06-24 PROCEDURE — 25010000002 CEFAZOLIN PER 500 MG: Performed by: INTERNAL MEDICINE

## 2022-06-24 RX ORDER — CLONIDINE HYDROCHLORIDE 0.1 MG/1
0.1 TABLET ORAL EVERY 4 HOURS PRN
Status: DISCONTINUED | OUTPATIENT
Start: 2022-06-24 | End: 2022-07-06 | Stop reason: HOSPADM

## 2022-06-25 PROCEDURE — 97110 THERAPEUTIC EXERCISES: CPT

## 2022-06-25 PROCEDURE — 97530 THERAPEUTIC ACTIVITIES: CPT

## 2022-06-25 PROCEDURE — 25010000002 CEFAZOLIN PER 500 MG: Performed by: INTERNAL MEDICINE

## 2022-06-26 LAB — BACTERIA SPEC AEROBE CULT: ABNORMAL

## 2022-06-26 PROCEDURE — 25010000002 CEFAZOLIN PER 500 MG: Performed by: INTERNAL MEDICINE

## 2022-06-27 LAB
ANION GAP SERPL CALCULATED.3IONS-SCNC: 6 MMOL/L (ref 5–15)
BUN SERPL-MCNC: 12 MG/DL (ref 8–23)
BUN/CREAT SERPL: 19.4 (ref 7–25)
CALCIUM SPEC-SCNC: 8.7 MG/DL (ref 8.6–10.5)
CHLORIDE SERPL-SCNC: 102 MMOL/L (ref 98–107)
CO2 SERPL-SCNC: 32 MMOL/L (ref 22–29)
CREAT SERPL-MCNC: 0.62 MG/DL (ref 0.57–1)
CRP SERPL-MCNC: 6.25 MG/DL (ref 0–0.5)
DEPRECATED RDW RBC AUTO: 54.9 FL (ref 37–54)
EGFRCR SERPLBLD CKD-EPI 2021: 97.1 ML/MIN/1.73
ERYTHROCYTE [DISTWIDTH] IN BLOOD BY AUTOMATED COUNT: 17.4 % (ref 12.3–15.4)
ERYTHROCYTE [SEDIMENTATION RATE] IN BLOOD: 38 MM/HR (ref 0–30)
GLUCOSE SERPL-MCNC: 85 MG/DL (ref 65–99)
HCT VFR BLD AUTO: 24.9 % (ref 34–46.6)
HGB BLD-MCNC: 7.4 G/DL (ref 12–15.9)
MCH RBC QN AUTO: 26.1 PG (ref 26.6–33)
MCHC RBC AUTO-ENTMCNC: 29.7 G/DL (ref 31.5–35.7)
MCV RBC AUTO: 88 FL (ref 79–97)
PLATELET # BLD AUTO: 204 10*3/MM3 (ref 140–450)
PMV BLD AUTO: 9.6 FL (ref 6–12)
POTASSIUM SERPL-SCNC: 3.7 MMOL/L (ref 3.5–5.2)
RBC # BLD AUTO: 2.83 10*6/MM3 (ref 3.77–5.28)
SODIUM SERPL-SCNC: 140 MMOL/L (ref 136–145)
WBC NRBC COR # BLD: 3.28 10*3/MM3 (ref 3.4–10.8)

## 2022-06-27 PROCEDURE — 86140 C-REACTIVE PROTEIN: CPT | Performed by: INTERNAL MEDICINE

## 2022-06-27 PROCEDURE — 80048 BASIC METABOLIC PNL TOTAL CA: CPT | Performed by: INTERNAL MEDICINE

## 2022-06-27 PROCEDURE — 25010000002 CEFAZOLIN PER 500 MG: Performed by: INTERNAL MEDICINE

## 2022-06-27 PROCEDURE — 97110 THERAPEUTIC EXERCISES: CPT

## 2022-06-27 PROCEDURE — 85652 RBC SED RATE AUTOMATED: CPT | Performed by: INTERNAL MEDICINE

## 2022-06-27 PROCEDURE — 97535 SELF CARE MNGMENT TRAINING: CPT

## 2022-06-27 PROCEDURE — 25010000002 HYDROMORPHONE 1 MG/ML SOLUTION: Performed by: INTERNAL MEDICINE

## 2022-06-27 PROCEDURE — 85027 COMPLETE CBC AUTOMATED: CPT | Performed by: INTERNAL MEDICINE

## 2022-06-27 RX ORDER — HYDRALAZINE HYDROCHLORIDE 10 MG/1
10 TABLET, FILM COATED ORAL EVERY 8 HOURS SCHEDULED
Status: DISCONTINUED | OUTPATIENT
Start: 2022-06-27 | End: 2022-07-06 | Stop reason: HOSPADM

## 2022-06-28 PROCEDURE — 25010000002 CEFAZOLIN PER 500 MG: Performed by: INTERNAL MEDICINE

## 2022-06-28 PROCEDURE — 97168 OT RE-EVAL EST PLAN CARE: CPT | Performed by: OCCUPATIONAL THERAPIST

## 2022-06-28 PROCEDURE — 97530 THERAPEUTIC ACTIVITIES: CPT

## 2022-06-29 PROCEDURE — 25010000002 CEFAZOLIN PER 500 MG: Performed by: INTERNAL MEDICINE

## 2022-06-29 PROCEDURE — 97530 THERAPEUTIC ACTIVITIES: CPT

## 2022-06-29 PROCEDURE — 97110 THERAPEUTIC EXERCISES: CPT

## 2022-06-29 PROCEDURE — 25010000002 HYDROMORPHONE 1 MG/ML SOLUTION: Performed by: INTERNAL MEDICINE

## 2022-06-30 LAB
ANION GAP SERPL CALCULATED.3IONS-SCNC: 6 MMOL/L (ref 5–15)
BASOPHILS # BLD AUTO: 0.02 10*3/MM3 (ref 0–0.2)
BASOPHILS NFR BLD AUTO: 0.5 % (ref 0–1.5)
BUN SERPL-MCNC: 11 MG/DL (ref 8–23)
BUN/CREAT SERPL: 19.3 (ref 7–25)
CALCIUM SPEC-SCNC: 8.6 MG/DL (ref 8.6–10.5)
CHLORIDE SERPL-SCNC: 101 MMOL/L (ref 98–107)
CO2 SERPL-SCNC: 30 MMOL/L (ref 22–29)
CREAT SERPL-MCNC: 0.57 MG/DL (ref 0.57–1)
CRP SERPL-MCNC: 4.31 MG/DL (ref 0–0.5)
DEPRECATED RDW RBC AUTO: 56.2 FL (ref 37–54)
EGFRCR SERPLBLD CKD-EPI 2021: 99.1 ML/MIN/1.73
EOSINOPHIL # BLD AUTO: 0.29 10*3/MM3 (ref 0–0.4)
EOSINOPHIL NFR BLD AUTO: 7.8 % (ref 0.3–6.2)
ERYTHROCYTE [DISTWIDTH] IN BLOOD BY AUTOMATED COUNT: 17.3 % (ref 12.3–15.4)
ERYTHROCYTE [SEDIMENTATION RATE] IN BLOOD: 72 MM/HR (ref 0–30)
GLUCOSE SERPL-MCNC: 96 MG/DL (ref 65–99)
HCT VFR BLD AUTO: 26 % (ref 34–46.6)
HGB BLD-MCNC: 7.5 G/DL (ref 12–15.9)
IMM GRANULOCYTES # BLD AUTO: 0.01 10*3/MM3 (ref 0–0.05)
IMM GRANULOCYTES NFR BLD AUTO: 0.3 % (ref 0–0.5)
LYMPHOCYTES # BLD AUTO: 1.36 10*3/MM3 (ref 0.7–3.1)
LYMPHOCYTES NFR BLD AUTO: 36.7 % (ref 19.6–45.3)
MCH RBC QN AUTO: 25.5 PG (ref 26.6–33)
MCHC RBC AUTO-ENTMCNC: 28.8 G/DL (ref 31.5–35.7)
MCV RBC AUTO: 88.4 FL (ref 79–97)
MONOCYTES # BLD AUTO: 0.37 10*3/MM3 (ref 0.1–0.9)
MONOCYTES NFR BLD AUTO: 10 % (ref 5–12)
NEUTROPHILS NFR BLD AUTO: 1.66 10*3/MM3 (ref 1.7–7)
NEUTROPHILS NFR BLD AUTO: 44.7 % (ref 42.7–76)
NRBC BLD AUTO-RTO: 0 /100 WBC (ref 0–0.2)
PLATELET # BLD AUTO: 183 10*3/MM3 (ref 140–450)
PMV BLD AUTO: 9.2 FL (ref 6–12)
POTASSIUM SERPL-SCNC: 4 MMOL/L (ref 3.5–5.2)
RBC # BLD AUTO: 2.94 10*6/MM3 (ref 3.77–5.28)
SODIUM SERPL-SCNC: 137 MMOL/L (ref 136–145)
WBC NRBC COR # BLD: 3.71 10*3/MM3 (ref 3.4–10.8)

## 2022-06-30 PROCEDURE — 97530 THERAPEUTIC ACTIVITIES: CPT

## 2022-06-30 PROCEDURE — 85025 COMPLETE CBC W/AUTO DIFF WBC: CPT | Performed by: INTERNAL MEDICINE

## 2022-06-30 PROCEDURE — 80048 BASIC METABOLIC PNL TOTAL CA: CPT | Performed by: INTERNAL MEDICINE

## 2022-06-30 PROCEDURE — 97535 SELF CARE MNGMENT TRAINING: CPT | Performed by: OCCUPATIONAL THERAPIST

## 2022-06-30 PROCEDURE — 85652 RBC SED RATE AUTOMATED: CPT | Performed by: INTERNAL MEDICINE

## 2022-06-30 PROCEDURE — 97530 THERAPEUTIC ACTIVITIES: CPT | Performed by: OCCUPATIONAL THERAPIST

## 2022-06-30 PROCEDURE — 97164 PT RE-EVAL EST PLAN CARE: CPT

## 2022-06-30 PROCEDURE — 86140 C-REACTIVE PROTEIN: CPT | Performed by: INTERNAL MEDICINE

## 2022-06-30 PROCEDURE — 25010000002 CEFAZOLIN PER 500 MG: Performed by: INTERNAL MEDICINE

## 2022-07-01 PROCEDURE — 25010000002 CEFAZOLIN PER 500 MG: Performed by: INTERNAL MEDICINE

## 2022-07-01 PROCEDURE — 25010000002 HYDROMORPHONE 1 MG/ML SOLUTION: Performed by: INTERNAL MEDICINE

## 2022-07-01 PROCEDURE — 97535 SELF CARE MNGMENT TRAINING: CPT

## 2022-07-01 NOTE — PLAN OF CARE
Goal Outcome Evaluation:  Plan of Care Reviewed With: patient        Progress: no change  Outcome Summary: A & O, up with SBA and WX to BR, voiding, c/o intermittent R hip and back pain, prn pain med given with relief, lidoderm patch applied to lower back, R hip wet-dry dressing applied, IVF and  IV ABX continue   Set up mri results via phone visit

## 2022-07-01 NOTE — PROGRESS NOTES
Infectious Diseases Progress Note    Patient:  Tawny Shin  YOB: 1955  MRN: [id number]   Admit date: 6/14/2022   Admitting Physician: Beni Urrutia MD  Primary Care Physician: Leta Julian DO    Chief Complaint/Interval History: She overall is doing well.  She has no diarrhea or rash.  No pain at her PICC line site.  Feels she is getting benefit from her local wound care, physical therapy, and IV antibiotic treatment.  Would like her to be able to continue to receive the benefits of LTAC for as long as possible.  Was planning 4 to 6 weeks of IV antibiotic treatment to manage the possibility of proximal right femur osteomyelitis.  Going to lean towards a longer course of treatment.  We will then transition to oral treatment to hopefully prevent any recurrence and maintain chronic suppression at the end of her intravenous treatment.  Sister at bedside today.    No intake or output data in the 24 hours ending 07/01/22 1150  Allergies:   Allergies   Allergen Reactions   • Atorvastatin Other (See Comments)     LEG CRAMPS     • Amoxicillin Rash   • Escitalopram Rash   • Nabumetone Rash   • Niacin Er Rash   • Penicillin G Rash   • Penicillins Rash   • Simvastatin Rash     Current Scheduled Medications:   apixaban, 5 mg, Oral, Q12H  ascorbic acid, 500 mg, Oral, Daily  aspirin, 81 mg, Oral, Daily  bisacodyl, 10 mg, Rectal, Daily  carvedilol, 25 mg, Oral, BID With Meals  ceFAZolin, 2 g, Intravenous, Q8H  docusate sodium, 100 mg, Oral, BID  DULoxetine, 60 mg, Oral, Daily  famotidine, 40 mg, Oral, Daily  ferrous gluconate, 324 mg, Oral, Daily With Breakfast  fluticasone, 1 spray, Nasal, Daily  furosemide, 40 mg, Oral, Daily  hydrALAZINE, 10 mg, Oral, Q8H  isosorbide mononitrate, 60 mg, Oral, Daily  levothyroxine, 75 mcg, Oral, Q AM  lidocaine, 1 patch, Transdermal, Q24H  lisinopril, 10 mg, Oral, Q24H  magnesium oxide, 400 mg, Oral, Daily  multivitamin with minerals, 1 tablet, Oral,  "Daily  polyethylene glycol, 17 g, Oral, BID  saccharomyces boulardii, 250 mg, Oral, BID  sennosides-docusate, 2 tablet, Oral, BID  sodium chloride, 10 mL, Intravenous, Q12H  sucralfate, 1 g, Oral, 4x Daily AC & at Bedtime  thiamine, 100 mg, Oral, Daily      Current PRN Medications:  •  acetaminophen **OR** acetaminophen  •  cloNIDine  •  Diclofenac Sodium  •  droperidol  •  HYDROcodone-acetaminophen  •  hydrocortisone-bacitracin-zinc oxide-nystatin  •  HYDROmorphone  •  labetalol  •  magnesium hydroxide  •  methylnaltrexone  •  nitroglycerin  •  ondansetron **OR** ondansetron  •  sodium chloride  •  traMADol    Review of Systems see HPI    Vital Signs:  Ht 167.6 cm (66\")   Wt 80.5 kg (177 lb 6.4 oz)   LMP  (LMP Unknown)   BMI 28.63 kg/m²     Physical Exam  Vital signs - reviewed.  Line/IV (left arm PICC) site - No erythema, warmth, induration, or tenderness.  No change in physical exam  Right thigh without surrounding erythema    Lab Results:  CBC:   Results from last 7 days   Lab Units 06/30/22  0450 06/27/22  0633   WBC 10*3/mm3 3.71 3.28*   HEMOGLOBIN g/dL 7.5* 7.4*   HEMATOCRIT % 26.0* 24.9*   PLATELETS 10*3/mm3 183 204     BMP:  Results from last 7 days   Lab Units 06/30/22  0450 06/27/22  0631   SODIUM mmol/L 137 140   POTASSIUM mmol/L 4.0 3.7   CHLORIDE mmol/L 101 102   CO2 mmol/L 30.0* 32.0*   BUN mg/dL 11 12   CREATININE mg/dL 0.57 0.62   GLUCOSE mg/dL 96 85   CALCIUM mg/dL 8.6 8.7     Component   Ref Range & Units 1 d ago   (6/30/22) 4 d ago   (6/27/22) 8 d ago   (6/23/22) 11 d ago   (6/20/22) 2 wk ago   (6/16/22) 2 wk ago   (6/11/22) 3 wk ago   (6/10/22)   C-Reactive Protein   0.00 - 0.50 mg/dL 4.31 High   6.25 High   2.86 High   6.57 High   2.36 High   10.97 High   8.10 High      Culture Results:  Right hip culture June 4, 2022:  Susceptibility     Staphylococcus aureus     LICHA     Clindamycin >=4 ug/ml Resistant     Erythromycin >=8 ug/ml Resistant     Inducible Clindamycin Resistance NEG ug/ml " Negative     Oxacillin 0.5 ug/ml Susceptible     Rifampin <=0.5 ug/ml Susceptible     Tetracycline >=16 ug/ml Resistant     Trimethoprim + Sulfamethoxazole <=10 ug/ml Susceptible     Vancomycin <=0.5 ug/ml Susceptible          Radiology: None    Additional Studies Reviewed: None    Impression:   1.  Methicillin susceptible staph aureus right hip abscess/possible osteomyelitis  2.  Encephalopathy-remains resolved-she appears to be at baseline  3.  Rheumatoid arthritis-she would like to resume some of her rheumatoid arthritis medicines.  She feels part of her difficulty participating more with physical therapy is significant morning stiffness and joint stiffness overall.  She has a hard time standing based on the stiffness in her ankle joints.  She indicates salicylate and Arava provide her the most benefit  4.  Chronic illness deconditioning/general lysed weakness/previous hematoma following kyphoplasty/degenerative joint disease-hopefully if she can maintain aggressive physical therapy she can see some incremental but steady improvements    Recommendations:   Going to plan for 6 weeks of IV antibiotic treatment and then transition to oral treatment  Previously had suggested 4 weeks which would have ended July 5-I am leaning toward the longer course of therapy at this point  See antibiotic plan outlined below  Hopefully she can remain on LTAC as long as possible  Will plan on the following antibiotic approach whether she is at LTAC or skilled nursing facility    Antibiotic plan:  1.  Diagnosis right femur osteomyelitis.  Rheumatoid arthritis.  2.  IV access-left arm PICC line  3.  Antibiotic plan:  Cefazolin 2 g IV every 8 hours through July 19, 2022 (6-week course of treatment)  Stop cefazolin after her last dose on July 19, 2022  Begin cephalexin 1000 mg orally every 12 hours for chronic suppression on July 20, 2022  4.  Laboratory monitoring:  CMP, CBC, CRP at least every Monday or Tuesday while on intravenous  antibiotic treatment  5.  She should have her PICC line removed after she completes IV cefazolin on July 19, 2022    Elie Ohara MD

## 2022-07-01 NOTE — PROGRESS NOTES
ARGELIA Guerrero APRN        Internal Medicine Progress Note    7/1/2022   09:33 CDT    Name:  Tawny Shin  MRN:    [id number]     Acct:     [id number]   Room:  [address] [place] [address] Day: 0     Admit Date: 6/14/2022  6:58 PM  PCP: Leta Julian DO    Subjective:     C/C: hip pain, back pain    Interval History: Status:  stayed the same. Resting in bed. No family at bedside. Feeling better today.  Afebrile. Pain fairly well controlled.  Tolerating treatment thus far.  Blood pressure remains elevated despite medication regimen adjustments. Counts stable.     Review of Systems   Constitutional: Positive for malaise/fatigue. Negative for chills, decreased appetite, weight gain and weight loss.   HENT: Negative for congestion, ear discharge, hoarse voice and tinnitus.    Eyes: Negative for blurred vision, discharge, visual disturbance and visual halos.   Cardiovascular: Negative for chest pain, claudication, dyspnea on exertion, irregular heartbeat, leg swelling, orthopnea and paroxysmal nocturnal dyspnea.   Respiratory: Negative for cough, shortness of breath, sputum production and wheezing.    Endocrine: Negative for cold intolerance, heat intolerance and polyuria.   Hematologic/Lymphatic: Negative for adenopathy. Does not bruise/bleed easily.   Skin: Negative for dry skin, itching and suspicious lesions.   Musculoskeletal: Positive for back pain, joint pain and muscle weakness. Negative for arthritis, falls and myalgias.   Gastrointestinal: Negative for abdominal pain, constipation, diarrhea, dysphagia and hematemesis.   Genitourinary: Negative for bladder incontinence, dysuria and frequency.   Neurological: Positive for weakness. Negative for aphonia, disturbances in coordination and dizziness.   Psychiatric/Behavioral: Negative for altered mental status, depression, memory loss and substance abuse. The patient does not have insomnia and is not nervous/anxious.           Medications:     Allergies:   Allergies   Allergen Reactions   • Atorvastatin Other (See Comments)     LEG CRAMPS     • Amoxicillin Rash   • Escitalopram Rash   • Nabumetone Rash   • Niacin Er Rash   • Penicillin G Rash   • Penicillins Rash   • Simvastatin Rash       Current Meds:   Current Facility-Administered Medications:   •  acetaminophen (TYLENOL) tablet 650 mg, 650 mg, Oral, Q4H PRN **OR** acetaminophen (TYLENOL) suppository 650 mg, 650 mg, Rectal, Q4H PRN, Beni Urrutia MD  •  apixaban (ELIQUIS) tablet 5 mg, 5 mg, Oral, Q12H, Beni Urrutia MD  •  ascorbic acid (VITAMIN C) tablet 500 mg, 500 mg, Oral, Daily, Beni Urrutia MD  •  aspirin EC tablet 81 mg, 81 mg, Oral, Daily, Beni Urrutia MD  •  bisacodyl (DULCOLAX) suppository 10 mg, 10 mg, Rectal, Daily, Beni Urrutia MD  •  carvedilol (COREG) tablet 25 mg, 25 mg, Oral, BID With Meals, Beni Urrutia MD  •  ceFAZolin in 0.9% normal saline (ANCEF) IVPB solution 2 g, 2 g, Intravenous, Q8H, Beni Urrutia MD  •  cloNIDine (CATAPRES) tablet 0.1 mg, 0.1 mg, Oral, Q4H PRN, Beni Urrutia MD  •  Diclofenac Sodium (VOLTAREN) 1 % gel 4 g, 4 g, Topical, 4x Daily PRN, Beni Urrutia MD  •  docusate sodium (COLACE) capsule 100 mg, 100 mg, Oral, BID, Beni Urrutia MD  •  droperidol (INAPSINE) injection 1.25 mg, 1.25 mg, Intravenous, Q6H PRN, Beni Urrutia MD  •  DULoxetine (CYMBALTA) DR capsule 60 mg, 60 mg, Oral, Daily, Beni Urrutia MD  •  famotidine (PEPCID) tablet 40 mg, 40 mg, Oral, Daily, Beni Urrutia MD  •  ferrous gluconate tablet 324 mg, 324 mg, Oral, Daily With Breakfast, Beni Urrutia MD  •  fluticasone (FLONASE) 50 MCG/ACT nasal spray 1 spray, 1 spray, Nasal, Daily, Beni Urrutia MD  •  furosemide (LASIX) tablet 40 mg, 40 mg, Oral, Daily, Beni Urrutia MD  •  hydrALAZINE (APRESOLINE) tablet 10 mg, 10 mg, Oral,  Q8H, Francie Golden, APRN  •  HYDROcodone-acetaminophen (NORCO) 7.5-325 MG per tablet 1 tablet, 1 tablet, Oral, Q8H PRN, Beni Urrutia MD  •  hydrocortisone-bacitracin-zinc oxide-nystatin (MAGIC BARRIER) ointment 1 application, 1 application, Topical, PRN, Beni Urrutia MD  •  HYDROmorphone (DILAUDID) injection 1 mg, 1 mg, Intravenous, Daily PRN, Beni Urrutia MD  •  isosorbide mononitrate (IMDUR) 24 hr tablet 60 mg, 60 mg, Oral, Daily, Beni Urrutia MD  •  labetalol (NORMODYNE,TRANDATE) injection 10 mg, 10 mg, Intravenous, Q4H PRN, Beni Urrutia MD  •  levothyroxine (SYNTHROID, LEVOTHROID) tablet 75 mcg, 75 mcg, Oral, Q AM, Beni Urrutia MD  •  lidocaine (LIDODERM) 5 % 1 patch, 1 patch, Transdermal, Q24H, Beni Urrutia MD  •  lisinopril (PRINIVIL,ZESTRIL) tablet 10 mg, 10 mg, Oral, Q24H, Francie Golden, APRN  •  magnesium hydroxide (MILK OF MAGNESIA) suspension 10 mL, 10 mL, Oral, Daily PRN, Beni Urrutia MD  •  magnesium oxide (MAG-OX) tablet 400 mg, 400 mg, Oral, Daily, Beni Urrutia MD  •  methylnaltrexone (RELISTOR) injection 12 mg, 12 mg, Subcutaneous, Q48H PRN, Francie Golden, APRNISREEN  •  multivitamin with minerals 1 tablet, 1 tablet, Oral, Daily, Beni Urrutia MD  •  nitroglycerin (NITROSTAT) SL tablet 0.4 mg, 0.4 mg, Sublingual, Q5 Min PRN, Beni Urrutia MD  •  ondansetron (ZOFRAN) tablet 4 mg, 4 mg, Oral, Q6H PRN **OR** ondansetron (ZOFRAN) injection 4 mg, 4 mg, Intravenous, Q6H PRN, Beni Urrutia MD  •  polyethylene glycol (MIRALAX) packet 17 g, 17 g, Oral, BID, Francie Golden APRN  •  saccharomyces boulardii (FLORASTOR) capsule 250 mg, 250 mg, Oral, BID, Beni Urrutia MD  •  sennosides-docusate (PERICOLACE) 8.6-50 MG per tablet 2 tablet, 2 tablet, Oral, BID, Beni Urrutia MD  •  sodium chloride 0.9 % flush 10 mL, 10 mL, Intravenous, Q12H, Beni Urrutia  "MD Wesley  •  sodium chloride 0.9 % flush 10 mL, 10 mL, Intravenous, PRN, Beni Urrutia MD  •  sucralfate (CARAFATE) tablet 1 g, 1 g, Oral, 4x Daily AC & at Bedtime, Beni Urrutia MD  •  thiamine (VITAMIN B-1) tablet 100 mg, 100 mg, Oral, Daily, Beni Urrutia MD  •  traMADol (ULTRAM) tablet 100 mg, 100 mg, Oral, TID PRN, Beni Urrutia MD    Data:     Code Status:    There are no questions and answers to display.       Family History   Problem Relation Age of Onset   • Cancer Mother    • Heart disease Father        Social History     Socioeconomic History   • Marital status:    Tobacco Use   • Smoking status: Never Smoker   • Smokeless tobacco: Never Used   Vaping Use   • Vaping Use: Never used   Substance and Sexual Activity   • Alcohol use: No   • Drug use: Never   • Sexual activity: Defer       Vitals:  Ht 167.6 cm (66\")   Wt 80.5 kg (177 lb 6.4 oz)   LMP  (LMP Unknown)   BMI 28.63 kg/m²   T 98.7 P 82 R 18 /91 Sp02 99% (room air)      I/O (24Hr):  No intake or output data in the 24 hours ending 07/01/22 0933    Labs and imaging:      No results found for this or any previous visit (from the past 12 hour(s)).        Physical Examination:        Physical Exam  Vitals and nursing note reviewed.   Constitutional:       Appearance: Normal appearance.   HENT:      Head: Normocephalic and atraumatic.      Right Ear: External ear normal.      Left Ear: External ear normal.      Nose: Nose normal.      Mouth/Throat:      Mouth: Mucous membranes are moist.      Pharynx: Oropharynx is clear.   Eyes:      Extraocular Movements: Extraocular movements intact.      Conjunctiva/sclera: Conjunctivae normal.      Pupils: Pupils are equal, round, and reactive to light.   Cardiovascular:      Rate and Rhythm: Normal rate and regular rhythm.      Pulses: Normal pulses.      Heart sounds: Normal heart sounds.   Pulmonary:      Effort: Pulmonary effort is normal.      Breath sounds: " Normal breath sounds.   Abdominal:      General: Bowel sounds are normal.      Palpations: Abdomen is soft.   Musculoskeletal:      Cervical back: Normal range of motion and neck supple.      Right lower leg: Edema present.      Left lower leg: Edema present.      Comments: Generalized weakness  Arthritis to fingers   Skin:     Comments: Dressing c/di  Wound vac intact   Neurological:      Mental Status: She is alert and oriented to person, place, and time.   Psychiatric:         Mood and Affect: Mood normal.         Behavior: Behavior normal.           Assessment:           * No active hospital problems. *    Past Medical History:   Diagnosis Date   • Age-related osteoporosis with current pathological fracture 5/27/2020   • Arthritis    • Asthma    • Bilateral bunions 12/23/2020   • Cancer ([facility])    • Cardiac pacemaker syndrome 12/23/2020    Overview:  - heart block - implanted 11/16   • Charcot's joint of foot, left 12/23/2020   • Chronic deep vein thrombosis (DVT) of right lower extremity ([facility]) 6/23/2021   • Chronic pain syndrome 6/22/2021   • Chronic sinusitis    • COPD (chronic obstructive pulmonary disease) ([facility])    • Coronary artery disease    • 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   • Elevated cholesterol    • Eustachian tube dysfunction    • Heart disease    • Herpes simplex    • History of transfusion    • Hyperlipidemia    • Hypertension    • Hypothyroidism 12/23/2020   • Intrinsic asthma 12/23/2020   • Knee dislocation    • Labral tear of right hip joint    • Laryngitis sicca    • Laryngitis, chronic    • Left carotid bruit 3/9/2016   • MI (myocardial infarction) ([facility])    • Myalgia due to statin 6/25/2019   • Open wound of right hip 9/14/2021   • Osteomyelitis of right femur ([facility]) 7/6/2021   • Otorrhea    • Pacemaker 11/17/2016   • Primary osteoarthritis of left knee 12/23/2020   • Psoriasis vulgaris 12/23/2020   • S/P coronary artery stent placement 3/9/2016   • Sensorineural hearing loss    •  "Seropositive rheumatoid arthritis of multiple sites ([facility]) 12/27/2019    Overview:  -myochrysine '93-'96 -methotrexate '96--->11/98;r/s  restarted 2/99--> 8/14 (anemia) -sulfasalazine- not effective -penicillamine 6/98-->10/98; no effect -leflunomide 11/98--> - Humira '13-->didn't take - Enbrel 12/14-->3/15- no effect!   Last Assessment & Plan:  - \"aching all over\" because she had to be off her anti-rheumatic drugs for 2 weeks in preparation for her R knee surgery - he   • Sick sinus syndrome ([facility]) 12/27/2019   • Sjogren's disease ([facility])    • Spondylolisthesis of lumbar region 1/17/2018   • Syncope, recurrent 2/8/2021        Plan:        1. Right hip abscess  2. MSSA wound infection/possible osteomyelitis  3. Rheumatoid arthritis  4. Chronic anticoagulation  5. RLE DVT (chronic)  6. HTN  7. Chronic Pain   8. Chronic Indwelling kay catheter  9. Hypothyroidism  10. UTI - yeast  11. Multifactorial anemia      Continue current treatment. Monitor counts. Increase activity. Labs Tuesday. Continue pain control efforts. Wound care per wound care team. Maintain patient safety. Aggressive therapies as tolerated.  Continue blood pressure control efforts.       Electronically signed by DANIELA Hill on 7/1/2022 at 09:33 CDT   I have discussed the care of Tawny Shin, including pertinent history and exam findings, with the nurse practitioner.    I have seen and examined the patient and the key elements of all parts of the encounter have been performed by me.  I agree with the assessment, plan and orders as documented by DANIELA Kyle, after I modified the exam findings and the plan of treatments and the final version is my approved version of the assessment.        Electronically signed by Beni Urrutia MD on 7/1/2022 at 22:05 CDT    "

## 2022-07-02 PROCEDURE — 63710000001 ONDANSETRON PER 8 MG: Performed by: INTERNAL MEDICINE

## 2022-07-02 PROCEDURE — 25010000002 CEFAZOLIN PER 500 MG: Performed by: INTERNAL MEDICINE

## 2022-07-02 PROCEDURE — 97530 THERAPEUTIC ACTIVITIES: CPT

## 2022-07-02 NOTE — PROGRESS NOTES
ARGELIA Guerrero APRN        Internal Medicine Progress Note    7/2/2022   13:19 CDT    Name:  Tawny Shin  MRN:    [id number]     Acct:     [id number]   Room:  [address] [place] [address] Day: 0     Admit Date: 6/14/2022  6:58 PM  PCP: Leta Julian DO    Subjective:     C/C: hip pain, back pain    Interval History: Status:  stayed the same. Resting in chair. No family at bedside. Feeling better today.  Afebrile. Pain fairly well controlled.  Tolerating treatment thus far.  Blood pressure remains elevated despite medication regimen adjustments. Counts stable.     Review of Systems   Constitutional: Positive for malaise/fatigue. Negative for chills, decreased appetite, weight gain and weight loss.   HENT: Negative for congestion, ear discharge, hoarse voice and tinnitus.    Eyes: Negative for blurred vision, discharge, visual disturbance and visual halos.   Cardiovascular: Negative for chest pain, claudication, dyspnea on exertion, irregular heartbeat, leg swelling, orthopnea and paroxysmal nocturnal dyspnea.   Respiratory: Negative for cough, shortness of breath, sputum production and wheezing.    Endocrine: Negative for cold intolerance, heat intolerance and polyuria.   Hematologic/Lymphatic: Negative for adenopathy. Does not bruise/bleed easily.   Skin: Negative for dry skin, itching and suspicious lesions.   Musculoskeletal: Positive for back pain, joint pain and muscle weakness. Negative for arthritis, falls and myalgias.   Gastrointestinal: Negative for abdominal pain, constipation, diarrhea, dysphagia and hematemesis.   Genitourinary: Negative for bladder incontinence, dysuria and frequency.   Neurological: Positive for weakness. Negative for aphonia, disturbances in coordination and dizziness.   Psychiatric/Behavioral: Negative for altered mental status, depression, memory loss and substance abuse. The patient does not have insomnia and is not nervous/anxious.           Medications:     Allergies:   Allergies   Allergen Reactions   • Atorvastatin Other (See Comments)     LEG CRAMPS     • Amoxicillin Rash   • Escitalopram Rash   • Nabumetone Rash   • Niacin Er Rash   • Penicillin G Rash   • Penicillins Rash   • Simvastatin Rash       Current Meds:   Current Facility-Administered Medications:   •  acetaminophen (TYLENOL) tablet 650 mg, 650 mg, Oral, Q4H PRN **OR** acetaminophen (TYLENOL) suppository 650 mg, 650 mg, Rectal, Q4H PRN, Beni Urrutia MD  •  apixaban (ELIQUIS) tablet 5 mg, 5 mg, Oral, Q12H, Beni Urrutia MD  •  ascorbic acid (VITAMIN C) tablet 500 mg, 500 mg, Oral, Daily, Beni Urrutia MD  •  aspirin EC tablet 81 mg, 81 mg, Oral, Daily, Beni Urrutia MD  •  bisacodyl (DULCOLAX) suppository 10 mg, 10 mg, Rectal, Daily, Beni Urrutia MD  •  carvedilol (COREG) tablet 25 mg, 25 mg, Oral, BID With Meals, Beni Urrutia MD  •  ceFAZolin in 0.9% normal saline (ANCEF) IVPB solution 2 g, 2 g, Intravenous, Q8H, Beni Urrutia MD  •  cloNIDine (CATAPRES) tablet 0.1 mg, 0.1 mg, Oral, Q4H PRN, Beni Urrutia MD  •  Diclofenac Sodium (VOLTAREN) 1 % gel 4 g, 4 g, Topical, 4x Daily PRN, Beni Urrutia MD  •  docusate sodium (COLACE) capsule 100 mg, 100 mg, Oral, BID, Beni Urrutia MD  •  droperidol (INAPSINE) injection 1.25 mg, 1.25 mg, Intravenous, Q6H PRN, Beni Urrutia MD  •  DULoxetine (CYMBALTA) DR capsule 60 mg, 60 mg, Oral, Daily, Beni Urrutia MD  •  famotidine (PEPCID) tablet 40 mg, 40 mg, Oral, Daily, Beni Urrutia MD  •  ferrous gluconate tablet 324 mg, 324 mg, Oral, Daily With Breakfast, Beni Urrutia MD  •  fluticasone (FLONASE) 50 MCG/ACT nasal spray 1 spray, 1 spray, Nasal, Daily, Beni Urrutia MD  •  furosemide (LASIX) tablet 40 mg, 40 mg, Oral, Daily, Beni Urrutia MD  •  hydrALAZINE (APRESOLINE) tablet 10 mg, 10 mg, Oral,  Q8H, Francie Golden, APRN  •  HYDROcodone-acetaminophen (NORCO) 7.5-325 MG per tablet 1 tablet, 1 tablet, Oral, Q8H PRN, Beni Urrutia MD  •  hydrocortisone-bacitracin-zinc oxide-nystatin (MAGIC BARRIER) ointment 1 application, 1 application, Topical, PRN, Beni Urrutia MD  •  isosorbide mononitrate (IMDUR) 24 hr tablet 60 mg, 60 mg, Oral, Daily, Beni Urrutia MD  •  labetalol (NORMODYNE,TRANDATE) injection 10 mg, 10 mg, Intravenous, Q4H PRN, Beni Urrutia MD  •  levothyroxine (SYNTHROID, LEVOTHROID) tablet 75 mcg, 75 mcg, Oral, Q AM, Beni Urrutia MD  •  lidocaine (LIDODERM) 5 % 1 patch, 1 patch, Transdermal, Q24H, Beni Urrutia MD  •  lisinopril (PRINIVIL,ZESTRIL) tablet 10 mg, 10 mg, Oral, Q24H, Francie Golden, APRN  •  magnesium hydroxide (MILK OF MAGNESIA) suspension 10 mL, 10 mL, Oral, Daily PRN, Beni Urrutia MD  •  magnesium oxide (MAG-OX) tablet 400 mg, 400 mg, Oral, Daily, Beni Urrutia MD  •  methylnaltrexone (RELISTOR) injection 12 mg, 12 mg, Subcutaneous, Q48H PRN, Francie Golden, APRN  •  multivitamin with minerals 1 tablet, 1 tablet, Oral, Daily, Beni Urrutia MD  •  nitroglycerin (NITROSTAT) SL tablet 0.4 mg, 0.4 mg, Sublingual, Q5 Min PRN, Beni Urrutia MD  •  ondansetron (ZOFRAN) tablet 4 mg, 4 mg, Oral, Q6H PRN **OR** ondansetron (ZOFRAN) injection 4 mg, 4 mg, Intravenous, Q6H PRN, Beni Urrutia MD  •  polyethylene glycol (MIRALAX) packet 17 g, 17 g, Oral, BID, Francie Golden, APRN  •  saccharomyces boulardii (FLORASTOR) capsule 250 mg, 250 mg, Oral, BID, Beni Urrutia MD  •  sennosides-docusate (PERICOLACE) 8.6-50 MG per tablet 2 tablet, 2 tablet, Oral, BID, Beni Urrutia MD  •  sodium chloride 0.9 % flush 10 mL, 10 mL, Intravenous, Q12H, Beni Urrutia MD  •  sodium chloride 0.9 % flush 10 mL, 10 mL, Intravenous, PRN, Beni Urrutia MD  •   "sucralfate (CARAFATE) tablet 1 g, 1 g, Oral, 4x Daily AC & at Bedtime, Beni Urrutia MD  •  thiamine (VITAMIN B-1) tablet 100 mg, 100 mg, Oral, Daily, Beni Urrutia MD  •  traMADol (ULTRAM) tablet 100 mg, 100 mg, Oral, TID PRN, Beni Urrutia MD    Data:     Code Status:    There are no questions and answers to display.       Family History   Problem Relation Age of Onset   • Cancer Mother    • Heart disease Father        Social History     Socioeconomic History   • Marital status:    Tobacco Use   • Smoking status: Never Smoker   • Smokeless tobacco: Never Used   Vaping Use   • Vaping Use: Never used   Substance and Sexual Activity   • Alcohol use: No   • Drug use: Never   • Sexual activity: Defer       Vitals:  Ht 167.6 cm (66\")   Wt 80.5 kg (177 lb 6.4 oz)   LMP  (LMP Unknown)   BMI 28.63 kg/m²   T 97.2 P 69 R 18 /79 Sp02 91% (room air)      I/O (24Hr):  No intake or output data in the 24 hours ending 07/02/22 1319    Labs and imaging:      No results found for this or any previous visit (from the past 12 hour(s)).        Physical Examination:        Physical Exam  Vitals and nursing note reviewed.   Constitutional:       Appearance: Normal appearance.   HENT:      Head: Normocephalic and atraumatic.      Right Ear: External ear normal.      Left Ear: External ear normal.      Nose: Nose normal.      Mouth/Throat:      Mouth: Mucous membranes are moist.      Pharynx: Oropharynx is clear.   Eyes:      Extraocular Movements: Extraocular movements intact.      Conjunctiva/sclera: Conjunctivae normal.      Pupils: Pupils are equal, round, and reactive to light.   Cardiovascular:      Rate and Rhythm: Normal rate and regular rhythm.      Pulses: Normal pulses.      Heart sounds: Normal heart sounds.   Pulmonary:      Effort: Pulmonary effort is normal.      Breath sounds: Normal breath sounds.   Abdominal:      General: Bowel sounds are normal.      Palpations: Abdomen is " soft.   Musculoskeletal:      Cervical back: Normal range of motion and neck supple.      Right lower leg: Edema present.      Left lower leg: Edema present.      Comments: Generalized weakness  Arthritis to fingers   Skin:     Comments: Dressing c/di  Wound vac intact   Neurological:      Mental Status: She is alert and oriented to person, place, and time.   Psychiatric:         Mood and Affect: Mood normal.         Behavior: Behavior normal.           Assessment:           * No active hospital problems. *    Past Medical History:   Diagnosis Date   • Age-related osteoporosis with current pathological fracture 5/27/2020   • Arthritis    • Asthma    • Bilateral bunions 12/23/2020   • Cancer ([facility])    • Cardiac pacemaker syndrome 12/23/2020    Overview:  - heart block - implanted 11/16   • Charcot's joint of foot, left 12/23/2020   • Chronic deep vein thrombosis (DVT) of right lower extremity ([facility]) 6/23/2021   • Chronic pain syndrome 6/22/2021   • Chronic sinusitis    • COPD (chronic obstructive pulmonary disease) ([facility])    • Coronary artery disease    • 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   • Elevated cholesterol    • Eustachian tube dysfunction    • Heart disease    • Herpes simplex    • History of transfusion    • Hyperlipidemia    • Hypertension    • Hypothyroidism 12/23/2020   • Intrinsic asthma 12/23/2020   • Knee dislocation    • Labral tear of right hip joint    • Laryngitis sicca    • Laryngitis, chronic    • Left carotid bruit 3/9/2016   • MI (myocardial infarction) ([facility])    • Myalgia due to statin 6/25/2019   • Open wound of right hip 9/14/2021   • Osteomyelitis of right femur ([facility]) 7/6/2021   • Otorrhea    • Pacemaker 11/17/2016   • Primary osteoarthritis of left knee 12/23/2020   • Psoriasis vulgaris 12/23/2020   • S/P coronary artery stent placement 3/9/2016   • Sensorineural hearing loss    • Seropositive rheumatoid arthritis of multiple sites ([facility]) 12/27/2019    Overview:  -myochrysine '93-'96  "-methotrexate '96--->11/98;r/s  restarted 2/99--> 8/14 (anemia) -sulfasalazine- not effective -penicillamine 6/98-->10/98; no effect -leflunomide 11/98--> - Humira '13-->didn't take - Enbrel 12/14-->3/15- no effect!   Last Assessment & Plan:  - \"aching all over\" because she had to be off her anti-rheumatic drugs for 2 weeks in preparation for her R knee surgery - he   • Sick sinus syndrome ([facility]) 12/27/2019   • Sjogren's disease ([facility])    • Spondylolisthesis of lumbar region 1/17/2018   • Syncope, recurrent 2/8/2021        Plan:        1. Right hip abscess  2. MSSA wound infection/possible osteomyelitis  3. Rheumatoid arthritis  4. Chronic anticoagulation  5. RLE DVT (chronic)  6. HTN  7. Chronic Pain   8. Chronic Indwelling kay catheter  9. Hypothyroidism  10. UTI - yeast  11. Multifactorial anemia      Continue current treatment. Monitor counts. Increase activity. Labs Tuesday. Continue pain control efforts. Wound care per wound care team. Maintain patient safety. Aggressive therapies as tolerated.  Continue blood pressure control efforts.       Electronically signed by DANIELA Phelan on 7/2/2022 at 13:19 CDT     I have discussed the care of Tawny Shin, including pertinent history and exam findings, with the nurse practitioner.    I have seen and examined the patient and the key elements of all parts of the encounter have been performed by me.  I agree with the assessment, plan and orders as documented by Tanmay SUAREZ , after I modified the exam findings and the plan of treatments and the final version is my approved version of the assessment.        Electronically signed by Beni Urrutia MD on 7/2/2022 at 21:47 CDT    "

## 2022-07-03 PROCEDURE — 97110 THERAPEUTIC EXERCISES: CPT

## 2022-07-03 PROCEDURE — 97530 THERAPEUTIC ACTIVITIES: CPT

## 2022-07-03 PROCEDURE — 25010000002 CEFAZOLIN PER 500 MG: Performed by: INTERNAL MEDICINE

## 2022-07-03 NOTE — PROGRESS NOTES
ARGELIA Guerrero APRN        Internal Medicine Progress Note    7/3/2022   10:41 CDT    Name:  Tawny Shin  MRN:    [id number]     Acct:     [id number]   Room:  [address] [place] [address] Day: 0     Admit Date: 6/14/2022  6:58 PM  PCP: Leta Julian DO    Subjective:     C/C: hip pain, back pain    Interval History: Status:  stayed the same. Resting in bed. No family at bedside. Feeling better today.  Afebrile. Pain fairly well controlled.  Tolerating treatment thus far.  Blood pressure remains elevated despite medication regimen adjustments. Counts stable.     Review of Systems   Constitutional: Positive for malaise/fatigue. Negative for chills, decreased appetite, weight gain and weight loss.   HENT: Negative for congestion, ear discharge, hoarse voice and tinnitus.    Eyes: Negative for blurred vision, discharge, visual disturbance and visual halos.   Cardiovascular: Negative for chest pain, claudication, dyspnea on exertion, irregular heartbeat, leg swelling, orthopnea and paroxysmal nocturnal dyspnea.   Respiratory: Negative for cough, shortness of breath, sputum production and wheezing.    Endocrine: Negative for cold intolerance, heat intolerance and polyuria.   Hematologic/Lymphatic: Negative for adenopathy. Does not bruise/bleed easily.   Skin: Negative for dry skin, itching and suspicious lesions.   Musculoskeletal: Positive for back pain, joint pain and muscle weakness. Negative for arthritis, falls and myalgias.   Gastrointestinal: Negative for abdominal pain, constipation, diarrhea, dysphagia and hematemesis.   Genitourinary: Negative for bladder incontinence, dysuria and frequency.   Neurological: Positive for weakness. Negative for aphonia, disturbances in coordination and dizziness.   Psychiatric/Behavioral: Negative for altered mental status, depression, memory loss and substance abuse. The patient does not have insomnia and is not nervous/anxious.           Medications:     Allergies:   Allergies   Allergen Reactions   • Atorvastatin Other (See Comments)     LEG CRAMPS     • Amoxicillin Rash   • Escitalopram Rash   • Nabumetone Rash   • Niacin Er Rash   • Penicillin G Rash   • Penicillins Rash   • Simvastatin Rash       Current Meds:   Current Facility-Administered Medications:   •  acetaminophen (TYLENOL) tablet 650 mg, 650 mg, Oral, Q4H PRN **OR** acetaminophen (TYLENOL) suppository 650 mg, 650 mg, Rectal, Q4H PRN, Beni Urrutia MD  •  apixaban (ELIQUIS) tablet 5 mg, 5 mg, Oral, Q12H, Beni Urrutia MD  •  ascorbic acid (VITAMIN C) tablet 500 mg, 500 mg, Oral, Daily, Beni Urrutia MD  •  aspirin EC tablet 81 mg, 81 mg, Oral, Daily, Beni Urrutia MD  •  bisacodyl (DULCOLAX) suppository 10 mg, 10 mg, Rectal, Daily, Beni Urrutia MD  •  carvedilol (COREG) tablet 25 mg, 25 mg, Oral, BID With Meals, Beni Urrutia MD  •  ceFAZolin in 0.9% normal saline (ANCEF) IVPB solution 2 g, 2 g, Intravenous, Q8H, Beni Urrutia MD  •  cloNIDine (CATAPRES) tablet 0.1 mg, 0.1 mg, Oral, Q4H PRN, Beni Urrutia MD  •  Diclofenac Sodium (VOLTAREN) 1 % gel 4 g, 4 g, Topical, 4x Daily PRN, Beni Urrutia MD  •  docusate sodium (COLACE) capsule 100 mg, 100 mg, Oral, BID, Beni Urrutia MD  •  droperidol (INAPSINE) injection 1.25 mg, 1.25 mg, Intravenous, Q6H PRN, Beni Urrutia MD  •  DULoxetine (CYMBALTA) DR capsule 60 mg, 60 mg, Oral, Daily, Beni Urrutia MD  •  famotidine (PEPCID) tablet 40 mg, 40 mg, Oral, Daily, Beni Urrutia MD  •  ferrous gluconate tablet 324 mg, 324 mg, Oral, Daily With Breakfast, Beni Urrutia MD  •  fluticasone (FLONASE) 50 MCG/ACT nasal spray 1 spray, 1 spray, Nasal, Daily, Beni Urrutia MD  •  furosemide (LASIX) tablet 40 mg, 40 mg, Oral, Daily, Beni Urrutia MD  •  hydrALAZINE (APRESOLINE) tablet 10 mg, 10 mg, Oral,  Q8H, Francie Golden, APRN  •  HYDROcodone-acetaminophen (NORCO) 7.5-325 MG per tablet 1 tablet, 1 tablet, Oral, Q8H PRN, Beni Urrutia MD  •  hydrocortisone-bacitracin-zinc oxide-nystatin (MAGIC BARRIER) ointment 1 application, 1 application, Topical, PRN, Beni Urrutia MD  •  isosorbide mononitrate (IMDUR) 24 hr tablet 60 mg, 60 mg, Oral, Daily, Beni Urrutia MD  •  labetalol (NORMODYNE,TRANDATE) injection 10 mg, 10 mg, Intravenous, Q4H PRN, Beni Urrutia MD  •  levothyroxine (SYNTHROID, LEVOTHROID) tablet 75 mcg, 75 mcg, Oral, Q AM, Beni Urrutia MD  •  lidocaine (LIDODERM) 5 % 1 patch, 1 patch, Transdermal, Q24H, Beni Urrutia MD  •  lisinopril (PRINIVIL,ZESTRIL) tablet 10 mg, 10 mg, Oral, Q24H, Francie Golden, APRN  •  magnesium hydroxide (MILK OF MAGNESIA) suspension 10 mL, 10 mL, Oral, Daily PRN, Beni Urrutia MD  •  magnesium oxide (MAG-OX) tablet 400 mg, 400 mg, Oral, Daily, Beni Urrutia MD  •  methylnaltrexone (RELISTOR) injection 12 mg, 12 mg, Subcutaneous, Q48H PRN, Francie Golden, APRN  •  multivitamin with minerals 1 tablet, 1 tablet, Oral, Daily, Beni Urrutia MD  •  nitroglycerin (NITROSTAT) SL tablet 0.4 mg, 0.4 mg, Sublingual, Q5 Min PRN, Beni Urrutia MD  •  ondansetron (ZOFRAN) tablet 4 mg, 4 mg, Oral, Q6H PRN **OR** ondansetron (ZOFRAN) injection 4 mg, 4 mg, Intravenous, Q6H PRN, Beni Urrutia MD  •  polyethylene glycol (MIRALAX) packet 17 g, 17 g, Oral, BID, Francie Golden, APRN  •  saccharomyces boulardii (FLORASTOR) capsule 250 mg, 250 mg, Oral, BID, Beni Urrutia MD  •  sennosides-docusate (PERICOLACE) 8.6-50 MG per tablet 2 tablet, 2 tablet, Oral, BID, Beni Urrutia MD  •  sodium chloride 0.9 % flush 10 mL, 10 mL, Intravenous, Q12H, Beni Urrutia MD  •  sodium chloride 0.9 % flush 10 mL, 10 mL, Intravenous, PRN, Beni Urrutia MD  •   "sucralfate (CARAFATE) tablet 1 g, 1 g, Oral, 4x Daily AC & at Bedtime, Beni Urrutia MD  •  thiamine (VITAMIN B-1) tablet 100 mg, 100 mg, Oral, Daily, Beni Urrutia MD  •  traMADol (ULTRAM) tablet 100 mg, 100 mg, Oral, TID PRN, Beni Urrutia MD    Data:     Code Status:    There are no questions and answers to display.       Family History   Problem Relation Age of Onset   • Cancer Mother    • Heart disease Father        Social History     Socioeconomic History   • Marital status:    Tobacco Use   • Smoking status: Never Smoker   • Smokeless tobacco: Never Used   Vaping Use   • Vaping Use: Never used   Substance and Sexual Activity   • Alcohol use: No   • Drug use: Never   • Sexual activity: Defer       Vitals:  Ht 167.6 cm (66\")   Wt 80.5 kg (177 lb 6.4 oz)   LMP  (LMP Unknown)   BMI 28.63 kg/m²   T 98.2 P 65 R 18 /64 Sp02 91% (room air)      I/O (24Hr):  No intake or output data in the 24 hours ending 07/03/22 1041    Labs and imaging:      No results found for this or any previous visit (from the past 12 hour(s)).        Physical Examination:        Physical Exam  Vitals and nursing note reviewed.   Constitutional:       Appearance: Normal appearance.   HENT:      Head: Normocephalic and atraumatic.      Right Ear: External ear normal.      Left Ear: External ear normal.      Nose: Nose normal.      Mouth/Throat:      Mouth: Mucous membranes are moist.      Pharynx: Oropharynx is clear.   Eyes:      Extraocular Movements: Extraocular movements intact.      Conjunctiva/sclera: Conjunctivae normal.      Pupils: Pupils are equal, round, and reactive to light.   Cardiovascular:      Rate and Rhythm: Normal rate and regular rhythm.      Pulses: Normal pulses.      Heart sounds: Normal heart sounds.   Pulmonary:      Effort: Pulmonary effort is normal.      Breath sounds: Normal breath sounds.   Abdominal:      General: Bowel sounds are normal.      Palpations: Abdomen is " soft.   Musculoskeletal:      Cervical back: Normal range of motion and neck supple.      Right lower leg: Edema present.      Left lower leg: Edema present.      Comments: Generalized weakness  Arthritis to fingers   Skin:     Comments: Dressing c/di  Wound vac intact   Neurological:      Mental Status: She is alert and oriented to person, place, and time.   Psychiatric:         Mood and Affect: Mood normal.         Behavior: Behavior normal.           Assessment:           * No active hospital problems. *    Past Medical History:   Diagnosis Date   • Age-related osteoporosis with current pathological fracture 5/27/2020   • Arthritis    • Asthma    • Bilateral bunions 12/23/2020   • Cancer ([facility])    • Cardiac pacemaker syndrome 12/23/2020    Overview:  - heart block - implanted 11/16   • Charcot's joint of foot, left 12/23/2020   • Chronic deep vein thrombosis (DVT) of right lower extremity ([facility]) 6/23/2021   • Chronic pain syndrome 6/22/2021   • Chronic sinusitis    • COPD (chronic obstructive pulmonary disease) ([facility])    • Coronary artery disease    • 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   • Elevated cholesterol    • Eustachian tube dysfunction    • Heart disease    • Herpes simplex    • History of transfusion    • Hyperlipidemia    • Hypertension    • Hypothyroidism 12/23/2020   • Intrinsic asthma 12/23/2020   • Knee dislocation    • Labral tear of right hip joint    • Laryngitis sicca    • Laryngitis, chronic    • Left carotid bruit 3/9/2016   • MI (myocardial infarction) ([facility])    • Myalgia due to statin 6/25/2019   • Open wound of right hip 9/14/2021   • Osteomyelitis of right femur ([facility]) 7/6/2021   • Otorrhea    • Pacemaker 11/17/2016   • Primary osteoarthritis of left knee 12/23/2020   • Psoriasis vulgaris 12/23/2020   • S/P coronary artery stent placement 3/9/2016   • Sensorineural hearing loss    • Seropositive rheumatoid arthritis of multiple sites ([facility]) 12/27/2019    Overview:  -myochrysine '93-'96  "-methotrexate '96--->11/98;r/s  restarted 2/99--> 8/14 (anemia) -sulfasalazine- not effective -penicillamine 6/98-->10/98; no effect -leflunomide 11/98--> - Humira '13-->didn't take - Enbrel 12/14-->3/15- no effect!   Last Assessment & Plan:  - \"aching all over\" because she had to be off her anti-rheumatic drugs for 2 weeks in preparation for her R knee surgery - he   • Sick sinus syndrome (HCC) 12/27/2019   • Sjogren's disease (HCC)    • Spondylolisthesis of lumbar region 1/17/2018   • Syncope, recurrent 2/8/2021        Plan:        1. Right hip abscess  2. MSSA wound infection/possible osteomyelitis  3. Rheumatoid arthritis  4. Chronic anticoagulation  5. RLE DVT (chronic)  6. HTN  7. Chronic Pain   8. Chronic Indwelling kay catheter  9. Hypothyroidism  10. UTI - yeast  11. Multifactorial anemia      Continue current treatment. Monitor counts. Increase activity. Labs Tuesday. Continue pain control efforts. Wound care per wound care team. Maintain patient safety. Aggressive therapies as tolerated.  Continue blood pressure control efforts.       Electronically signed by DANIELA Phelan on 7/3/2022 at 10:41 CDT       "

## 2022-07-04 VITALS — HEIGHT: 66 IN | BODY MASS INDEX: 30.1 KG/M2 | WEIGHT: 187.3 LBS

## 2022-07-04 PROCEDURE — 25010000002 CEFAZOLIN PER 500 MG: Performed by: INTERNAL MEDICINE

## 2022-07-04 PROCEDURE — 97535 SELF CARE MNGMENT TRAINING: CPT

## 2022-07-04 PROCEDURE — 97110 THERAPEUTIC EXERCISES: CPT

## 2022-07-04 PROCEDURE — 97530 THERAPEUTIC ACTIVITIES: CPT

## 2022-07-04 NOTE — PROGRESS NOTES
ARGELIA Guerrero APRN        Internal Medicine Progress Note    7/4/2022   12:10 CDT    Name:  Tawny Shin  MRN:    [id number]     Acct:     [id number]   Room:  [address] [place] [address] Day: 0     Admit Date: 6/14/2022  6:58 PM  PCP: Leta Julian DO    Subjective:     C/C: hip pain, back pain    Interval History: Status:  stayed the same. Resting in bed. No family at bedside. Feeling better today.  Afebrile. Pain fairly well controlled.  Tolerating treatment thus far.  Blood pressure remains elevated despite medication regimen adjustments. Counts stable.     Review of Systems   Constitutional: Positive for malaise/fatigue. Negative for chills, decreased appetite, weight gain and weight loss.   HENT: Negative for congestion, ear discharge, hoarse voice and tinnitus.    Eyes: Negative for blurred vision, discharge, visual disturbance and visual halos.   Cardiovascular: Negative for chest pain, claudication, dyspnea on exertion, irregular heartbeat, leg swelling, orthopnea and paroxysmal nocturnal dyspnea.   Respiratory: Negative for cough, shortness of breath, sputum production and wheezing.    Endocrine: Negative for cold intolerance, heat intolerance and polyuria.   Hematologic/Lymphatic: Negative for adenopathy. Does not bruise/bleed easily.   Skin: Negative for dry skin, itching and suspicious lesions.   Musculoskeletal: Positive for back pain, joint pain and muscle weakness. Negative for arthritis, falls and myalgias.   Gastrointestinal: Negative for abdominal pain, constipation, diarrhea, dysphagia and hematemesis.   Genitourinary: Negative for bladder incontinence, dysuria and frequency.   Neurological: Positive for weakness. Negative for aphonia, disturbances in coordination and dizziness.   Psychiatric/Behavioral: Negative for altered mental status, depression, memory loss and substance abuse. The patient does not have insomnia and is not nervous/anxious.           Medications:     Allergies:   Allergies   Allergen Reactions   • Atorvastatin Other (See Comments)     LEG CRAMPS     • Amoxicillin Rash   • Escitalopram Rash   • Nabumetone Rash   • Niacin Er Rash   • Penicillin G Rash   • Penicillins Rash   • Simvastatin Rash       Current Meds:   Current Facility-Administered Medications:   •  acetaminophen (TYLENOL) tablet 650 mg, 650 mg, Oral, Q4H PRN **OR** acetaminophen (TYLENOL) suppository 650 mg, 650 mg, Rectal, Q4H PRN, Beni Urrutia MD  •  apixaban (ELIQUIS) tablet 5 mg, 5 mg, Oral, Q12H, Beni Urrutia MD  •  ascorbic acid (VITAMIN C) tablet 500 mg, 500 mg, Oral, Daily, Beni Urrutia MD  •  aspirin EC tablet 81 mg, 81 mg, Oral, Daily, Beni Urrutia MD  •  bisacodyl (DULCOLAX) suppository 10 mg, 10 mg, Rectal, Daily, Beni Urrutia MD  •  carvedilol (COREG) tablet 25 mg, 25 mg, Oral, BID With Meals, Beni Urrutia MD  •  ceFAZolin in 0.9% normal saline (ANCEF) IVPB solution 2 g, 2 g, Intravenous, Q8H, Beni Urrutia MD  •  cloNIDine (CATAPRES) tablet 0.1 mg, 0.1 mg, Oral, Q4H PRN, Beni Urrutia MD  •  Diclofenac Sodium (VOLTAREN) 1 % gel 4 g, 4 g, Topical, 4x Daily PRN, Beni Urrutia MD  •  docusate sodium (COLACE) capsule 100 mg, 100 mg, Oral, BID, Beni Urrutia MD  •  droperidol (INAPSINE) injection 1.25 mg, 1.25 mg, Intravenous, Q6H PRN, Beni Urrutia MD  •  DULoxetine (CYMBALTA) DR capsule 60 mg, 60 mg, Oral, Daily, Beni Urrutia MD  •  famotidine (PEPCID) tablet 40 mg, 40 mg, Oral, Daily, Beni Urrutia MD  •  ferrous gluconate tablet 324 mg, 324 mg, Oral, Daily With Breakfast, Beni Urrutia MD  •  fluticasone (FLONASE) 50 MCG/ACT nasal spray 1 spray, 1 spray, Nasal, Daily, Beni Urrutia MD  •  furosemide (LASIX) tablet 40 mg, 40 mg, Oral, Daily, Beni Urrutia MD  •  hydrALAZINE (APRESOLINE) tablet 10 mg, 10 mg, Oral,  Q8H, Francie Golden, APRN  •  HYDROcodone-acetaminophen (NORCO) 7.5-325 MG per tablet 1 tablet, 1 tablet, Oral, Q8H PRN, Beni Urrutia MD  •  hydrocortisone-bacitracin-zinc oxide-nystatin (MAGIC BARRIER) ointment 1 application, 1 application, Topical, PRN, Beni Urrutia MD  •  isosorbide mononitrate (IMDUR) 24 hr tablet 60 mg, 60 mg, Oral, Daily, Beni Urrutia MD  •  labetalol (NORMODYNE,TRANDATE) injection 10 mg, 10 mg, Intravenous, Q4H PRN, Beni Urrutia MD  •  levothyroxine (SYNTHROID, LEVOTHROID) tablet 75 mcg, 75 mcg, Oral, Q AM, Beni Urrutia MD  •  lidocaine (LIDODERM) 5 % 1 patch, 1 patch, Transdermal, Q24H, Beni Urrutia MD  •  lisinopril (PRINIVIL,ZESTRIL) tablet 10 mg, 10 mg, Oral, Q24H, Francie Golden, APRN  •  magnesium hydroxide (MILK OF MAGNESIA) suspension 10 mL, 10 mL, Oral, Daily PRN, Beni Urrutia MD  •  magnesium oxide (MAG-OX) tablet 400 mg, 400 mg, Oral, Daily, Beni Urrutia MD  •  methylnaltrexone (RELISTOR) injection 12 mg, 12 mg, Subcutaneous, Q48H PRN, Francie Golden, APRN  •  multivitamin with minerals 1 tablet, 1 tablet, Oral, Daily, Beni Urrutia MD  •  nitroglycerin (NITROSTAT) SL tablet 0.4 mg, 0.4 mg, Sublingual, Q5 Min PRN, Beni Urrutia MD  •  ondansetron (ZOFRAN) tablet 4 mg, 4 mg, Oral, Q6H PRN **OR** ondansetron (ZOFRAN) injection 4 mg, 4 mg, Intravenous, Q6H PRN, Beni Urrutia MD  •  polyethylene glycol (MIRALAX) packet 17 g, 17 g, Oral, BID, Francie Golden, APRN  •  saccharomyces boulardii (FLORASTOR) capsule 250 mg, 250 mg, Oral, BID, Beni Urrutia MD  •  sennosides-docusate (PERICOLACE) 8.6-50 MG per tablet 2 tablet, 2 tablet, Oral, BID, Beni Urrutia MD  •  sodium chloride 0.9 % flush 10 mL, 10 mL, Intravenous, Q12H, Beni Urrutia MD  •  sodium chloride 0.9 % flush 10 mL, 10 mL, Intravenous, PRN, Beni Urrutia MD  •   "sucralfate (CARAFATE) tablet 1 g, 1 g, Oral, 4x Daily AC & at Bedtime, Beni Urrutia MD  •  thiamine (VITAMIN B-1) tablet 100 mg, 100 mg, Oral, Daily, Beni Urrutia MD  •  traMADol (ULTRAM) tablet 100 mg, 100 mg, Oral, TID PRN, Beni Urrutia MD    Data:     Code Status:    There are no questions and answers to display.       Family History   Problem Relation Age of Onset   • Cancer Mother    • Heart disease Father        Social History     Socioeconomic History   • Marital status:    Tobacco Use   • Smoking status: Never Smoker   • Smokeless tobacco: Never Used   Vaping Use   • Vaping Use: Never used   Substance and Sexual Activity   • Alcohol use: No   • Drug use: Never   • Sexual activity: Defer       Vitals:  Ht 167.6 cm (66\")   Wt 85 kg (187 lb 4.8 oz)   LMP  (LMP Unknown)   BMI 30.23 kg/m²   T 98.2 P 61 R 14 /78 Sp02 92% (room air).       I/O (24Hr):  No intake or output data in the 24 hours ending 07/04/22 1210    Labs and imaging:      No results found for this or any previous visit (from the past 12 hour(s)).        Physical Examination:        Physical Exam  Vitals and nursing note reviewed.   Constitutional:       Appearance: Normal appearance.   HENT:      Head: Normocephalic and atraumatic.      Right Ear: External ear normal.      Left Ear: External ear normal.      Nose: Nose normal.      Mouth/Throat:      Mouth: Mucous membranes are moist.      Pharynx: Oropharynx is clear.   Eyes:      Extraocular Movements: Extraocular movements intact.      Conjunctiva/sclera: Conjunctivae normal.      Pupils: Pupils are equal, round, and reactive to light.   Cardiovascular:      Rate and Rhythm: Normal rate and regular rhythm.      Pulses: Normal pulses.      Heart sounds: Normal heart sounds.   Pulmonary:      Effort: Pulmonary effort is normal.      Breath sounds: Normal breath sounds.   Abdominal:      General: Bowel sounds are normal.      Palpations: Abdomen is " soft.   Musculoskeletal:      Cervical back: Normal range of motion and neck supple.      Right lower leg: Edema present.      Left lower leg: Edema present.      Comments: Generalized weakness  Arthritis to fingers   Skin:     Comments: Dressing c/di  Wound vac intact   Neurological:      Mental Status: She is alert and oriented to person, place, and time.   Psychiatric:         Mood and Affect: Mood normal.         Behavior: Behavior normal.         Assessment:           * No active hospital problems. *    Past Medical History:   Diagnosis Date   • Age-related osteoporosis with current pathological fracture 5/27/2020   • Arthritis    • Asthma    • Bilateral bunions 12/23/2020   • Cancer ([facility])    • Cardiac pacemaker syndrome 12/23/2020    Overview:  - heart block - implanted 11/16   • Charcot's joint of foot, left 12/23/2020   • Chronic deep vein thrombosis (DVT) of right lower extremity ([facility]) 6/23/2021   • Chronic pain syndrome 6/22/2021   • Chronic sinusitis    • COPD (chronic obstructive pulmonary disease) ([facility])    • Coronary artery disease    • 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   • Elevated cholesterol    • Eustachian tube dysfunction    • Heart disease    • Herpes simplex    • History of transfusion    • Hyperlipidemia    • Hypertension    • Hypothyroidism 12/23/2020   • Intrinsic asthma 12/23/2020   • Knee dislocation    • Labral tear of right hip joint    • Laryngitis sicca    • Laryngitis, chronic    • Left carotid bruit 3/9/2016   • MI (myocardial infarction) ([facility])    • Myalgia due to statin 6/25/2019   • Open wound of right hip 9/14/2021   • Osteomyelitis of right femur ([facility]) 7/6/2021   • Otorrhea    • Pacemaker 11/17/2016   • Primary osteoarthritis of left knee 12/23/2020   • Psoriasis vulgaris 12/23/2020   • S/P coronary artery stent placement 3/9/2016   • Sensorineural hearing loss    • Seropositive rheumatoid arthritis of multiple sites ([facility]) 12/27/2019    Overview:  -myochrysine '93-'96  "-methotrexate '96--->11/98;r/s  restarted 2/99--> 8/14 (anemia) -sulfasalazine- not effective -penicillamine 6/98-->10/98; no effect -leflunomide 11/98--> - Humira '13-->didn't take - Enbrel 12/14-->3/15- no effect!   Last Assessment & Plan:  - \"aching all over\" because she had to be off her anti-rheumatic drugs for 2 weeks in preparation for her R knee surgery - he   • Sick sinus syndrome (HCC) 12/27/2019   • Sjogren's disease (HCC)    • Spondylolisthesis of lumbar region 1/17/2018   • Syncope, recurrent 2/8/2021        Plan:        1. Right hip abscess  2. MSSA wound infection/possible osteomyelitis  3. Rheumatoid arthritis  4. Chronic anticoagulation  5. RLE DVT (chronic)  6. HTN  7. Chronic Pain   8. Chronic Indwelling kay catheter  9. Hypothyroidism  10. UTI - yeast  11. Multifactorial anemia      Continue current treatment. Monitor counts. Increase activity. Labs Tuesday. Continue pain control efforts. Wound care per wound care team. Maintain patient safety. Aggressive therapies as tolerated. Continue blood pressure control efforts.       Electronically signed by DANIELA Phelan on 7/4/2022 at 12:10 CDT     I have discussed the care of Tawny Shin, including pertinent history and exam findings, with the nurse practitioner.    I have seen and examined the patient and the key elements of all parts of the encounter have been performed by me.  I agree with the assessment, plan and orders as documented by Tanmay SUAREZ, after I modified the exam findings and the plan of treatments and the final version is my approved version of the assessment.        Electronically signed by Beni Urrutia MD on 7/4/2022 at 20:30 CDT    "

## 2022-07-05 LAB
ANION GAP SERPL CALCULATED.3IONS-SCNC: 6 MMOL/L (ref 5–15)
BASOPHILS # BLD AUTO: 0.02 10*3/MM3 (ref 0–0.2)
BASOPHILS NFR BLD AUTO: 0.6 % (ref 0–1.5)
BUN SERPL-MCNC: 14 MG/DL (ref 8–23)
BUN/CREAT SERPL: 20.3 (ref 7–25)
CALCIUM SPEC-SCNC: 9 MG/DL (ref 8.6–10.5)
CHLORIDE SERPL-SCNC: 102 MMOL/L (ref 98–107)
CO2 SERPL-SCNC: 31 MMOL/L (ref 22–29)
CREAT SERPL-MCNC: 0.69 MG/DL (ref 0.57–1)
CRP SERPL-MCNC: 4.27 MG/DL (ref 0–0.5)
DEPRECATED RDW RBC AUTO: 54.8 FL (ref 37–54)
EGFRCR SERPLBLD CKD-EPI 2021: 94.7 ML/MIN/1.73
EOSINOPHIL # BLD AUTO: 0.26 10*3/MM3 (ref 0–0.4)
EOSINOPHIL NFR BLD AUTO: 8.3 % (ref 0.3–6.2)
ERYTHROCYTE [DISTWIDTH] IN BLOOD BY AUTOMATED COUNT: 17.1 % (ref 12.3–15.4)
ERYTHROCYTE [SEDIMENTATION RATE] IN BLOOD: 75 MM/HR (ref 0–30)
GLUCOSE SERPL-MCNC: 98 MG/DL (ref 65–99)
HCT VFR BLD AUTO: 27.4 % (ref 34–46.6)
HGB BLD-MCNC: 8 G/DL (ref 12–15.9)
IMM GRANULOCYTES # BLD AUTO: 0.01 10*3/MM3 (ref 0–0.05)
IMM GRANULOCYTES NFR BLD AUTO: 0.3 % (ref 0–0.5)
LYMPHOCYTES # BLD AUTO: 1.05 10*3/MM3 (ref 0.7–3.1)
LYMPHOCYTES NFR BLD AUTO: 33.4 % (ref 19.6–45.3)
MCH RBC QN AUTO: 25.7 PG (ref 26.6–33)
MCHC RBC AUTO-ENTMCNC: 29.2 G/DL (ref 31.5–35.7)
MCV RBC AUTO: 88.1 FL (ref 79–97)
MONOCYTES # BLD AUTO: 0.39 10*3/MM3 (ref 0.1–0.9)
MONOCYTES NFR BLD AUTO: 12.4 % (ref 5–12)
NEUTROPHILS NFR BLD AUTO: 1.41 10*3/MM3 (ref 1.7–7)
NEUTROPHILS NFR BLD AUTO: 45 % (ref 42.7–76)
NRBC BLD AUTO-RTO: 0 /100 WBC (ref 0–0.2)
PLATELET # BLD AUTO: 190 10*3/MM3 (ref 140–450)
PMV BLD AUTO: 9.8 FL (ref 6–12)
POTASSIUM SERPL-SCNC: 4 MMOL/L (ref 3.5–5.2)
RBC # BLD AUTO: 3.11 10*6/MM3 (ref 3.77–5.28)
SODIUM SERPL-SCNC: 139 MMOL/L (ref 136–145)
WBC NRBC COR # BLD: 3.14 10*3/MM3 (ref 3.4–10.8)

## 2022-07-05 PROCEDURE — 25010000002 CEFAZOLIN PER 500 MG: Performed by: INTERNAL MEDICINE

## 2022-07-05 PROCEDURE — 97110 THERAPEUTIC EXERCISES: CPT

## 2022-07-05 PROCEDURE — 85652 RBC SED RATE AUTOMATED: CPT | Performed by: INTERNAL MEDICINE

## 2022-07-05 PROCEDURE — 85025 COMPLETE CBC W/AUTO DIFF WBC: CPT | Performed by: INTERNAL MEDICINE

## 2022-07-05 PROCEDURE — 86140 C-REACTIVE PROTEIN: CPT | Performed by: INTERNAL MEDICINE

## 2022-07-05 PROCEDURE — 97535 SELF CARE MNGMENT TRAINING: CPT | Performed by: OCCUPATIONAL THERAPIST

## 2022-07-05 PROCEDURE — 97530 THERAPEUTIC ACTIVITIES: CPT

## 2022-07-05 PROCEDURE — 80048 BASIC METABOLIC PNL TOTAL CA: CPT | Performed by: INTERNAL MEDICINE

## 2022-07-05 NOTE — PROGRESS NOTES
Infectious Diseases Progress Note    Patient:  Tawny Shin  YOB: 1955  MRN: [id number]   Admit date: 6/14/2022   Admitting Physician: Beni Urrutia MD  Primary Care Physician: Leta Julian DO    Chief Complaint/Interval History: She may transition back to skilled nursing facility this week.  She would like additional medication to help manage her rheumatoid arthritis if possible.  She is particularly stiff in the mornings.  She feels she could do more with physical therapy with better symptomatic control of her rheumatoid arthritis.  She is tolerating cefazolin without side effect.  No diarrhea or rash.  Good oral intake.    No intake or output data in the 24 hours ending 07/05/22 0706  Allergies:   Allergies   Allergen Reactions   • Atorvastatin Other (See Comments)     LEG CRAMPS     • Amoxicillin Rash   • Escitalopram Rash   • Nabumetone Rash   • Niacin Er Rash   • Penicillin G Rash   • Penicillins Rash   • Simvastatin Rash     Current Scheduled Medications:   apixaban, 5 mg, Oral, Q12H  ascorbic acid, 500 mg, Oral, Daily  aspirin, 81 mg, Oral, Daily  bisacodyl, 10 mg, Rectal, Daily  carvedilol, 25 mg, Oral, BID With Meals  ceFAZolin, 2 g, Intravenous, Q8H  docusate sodium, 100 mg, Oral, BID  DULoxetine, 60 mg, Oral, Daily  famotidine, 40 mg, Oral, Daily  ferrous gluconate, 324 mg, Oral, Daily With Breakfast  fluticasone, 1 spray, Nasal, Daily  furosemide, 40 mg, Oral, Daily  hydrALAZINE, 10 mg, Oral, Q8H  isosorbide mononitrate, 60 mg, Oral, Daily  levothyroxine, 75 mcg, Oral, Q AM  lidocaine, 1 patch, Transdermal, Q24H  lisinopril, 10 mg, Oral, Q24H  magnesium oxide, 400 mg, Oral, Daily  multivitamin with minerals, 1 tablet, Oral, Daily  polyethylene glycol, 17 g, Oral, BID  saccharomyces boulardii, 250 mg, Oral, BID  sennosides-docusate, 2 tablet, Oral, BID  sodium chloride, 10 mL, Intravenous, Q12H  sucralfate, 1 g, Oral, 4x Daily AC & at Bedtime  thiamine, 100 mg, Oral,  "Daily      Current PRN Medications:  •  acetaminophen **OR** acetaminophen  •  cloNIDine  •  Diclofenac Sodium  •  droperidol  •  HYDROcodone-acetaminophen  •  hydrocortisone-bacitracin-zinc oxide-nystatin  •  labetalol  •  magnesium hydroxide  •  methylnaltrexone  •  nitroglycerin  •  ondansetron **OR** ondansetron  •  sodium chloride  •  traMADol    Review of Systems see HPI    Vital Signs:  Ht 167.6 cm (66\")   Wt 85 kg (187 lb 4.8 oz)   LMP  (LMP Unknown)   BMI 30.23 kg/m²     Physical Exam  Vital signs - reviewed.  Line/IV (PICC) site - No erythema, warmth, induration, or tenderness.  Right hip without erythema  Abdomen soft nontender    Lab Results:  CBC:   Results from last 7 days   Lab Units 07/05/22  0451 06/30/22  0450   WBC 10*3/mm3 3.14* 3.71   HEMOGLOBIN g/dL 8.0* 7.5*   HEMATOCRIT % 27.4* 26.0*   PLATELETS 10*3/mm3 190 183     BMP:  Results from last 7 days   Lab Units 07/05/22  0451 06/30/22  0450   SODIUM mmol/L 139 137   POTASSIUM mmol/L 4.0 4.0   CHLORIDE mmol/L 102 101   CO2 mmol/L 31.0* 30.0*   BUN mg/dL 14 11   CREATININE mg/dL 0.69 0.57   GLUCOSE mg/dL 98 96   CALCIUM mg/dL 9.0 8.6     Culture Results:    Right hip culture June 4, 2022:  Susceptibility              Staphylococcus aureus       LICHA       Clindamycin >=4 ug/ml Resistant       Erythromycin >=8 ug/ml Resistant       Inducible Clindamycin Resistance NEG ug/ml Negative       Oxacillin 0.5 ug/ml Susceptible       Rifampin <=0.5 ug/ml Susceptible       Tetracycline >=16 ug/ml Resistant       Trimethoprim + Sulfamethoxazole <=10 ug/ml Susceptible       Vancomycin <=0.5 ug/ml Susceptible       Radiology: None  Additional Studies Reviewed: None    Impression:   1.  Methicillin susceptible staph aureus right hip abscess/possible osteomyelitis  2.  Rheumatoid arthritis-struggling with morning stiffness  3.  Chronic illness deconditioning/generalized weakness/previous hematoma following kyphoplasty/degenerative joint " disease    Recommendations:   Continue cefazolin  Trying to talk with her rheumatologist for some guidance as to possible reinitiation of some of her rheumatoid arthritis treatment.  Suggested to nursing and discharge planning that they work closely with patient and family regarding discharge plans  Antibiotic recommendations outlined below    Antibiotic plan:  1.  Diagnosis right femur osteomyelitis.  Rheumatoid arthritis.  2.  IV access-left arm PICC line  3.  Antibiotic plan:  Cefazolin 2 g IV every 8 hours through July 19, 2022 (6-week course of treatment)  Stop cefazolin after her last dose on July 19, 2022  Begin cephalexin 1000 mg orally every 12 hours for chronic suppression on July 20, 2022  4.  Laboratory monitoring:  CMP, CBC, CRP at least every Monday or Tuesday while on intravenous antibiotic treatment  5.  She should have her PICC line removed after she completes IV cefazolin on July 19, 2022    Elie Ohara MD

## 2022-07-05 NOTE — PROGRESS NOTES
ARGELIA Guerrero APRN        Internal Medicine Progress Note    7/5/2022   12:54 CDT    Name:  Tawny Shin  MRN:    [id number]     Acct:     [id number]   Room:  [address] [place] [address] Day: 0     Admit Date: 6/14/2022  6:58 PM  PCP: Leta Julian DO    Subjective:     C/C: hip pain, back pain    Interval History: Status:  stayed the same. Up to chair. Family at bedside. Feeling better today.  Afebrile. Pain fairly well controlled.  Tolerating treatment thus far.  Blood pressure remains elevated despite medication regimen adjustments. Counts stable. Continues IV antibiotics under direction of ID.     Review of Systems   Constitutional: Positive for malaise/fatigue. Negative for chills, decreased appetite, weight gain and weight loss.   HENT: Negative for congestion, ear discharge, hoarse voice and tinnitus.    Eyes: Negative for blurred vision, discharge, visual disturbance and visual halos.   Cardiovascular: Negative for chest pain, claudication, dyspnea on exertion, irregular heartbeat, leg swelling, orthopnea and paroxysmal nocturnal dyspnea.   Respiratory: Negative for cough, shortness of breath, sputum production and wheezing.    Endocrine: Negative for cold intolerance, heat intolerance and polyuria.   Hematologic/Lymphatic: Negative for adenopathy. Does not bruise/bleed easily.   Skin: Negative for dry skin, itching and suspicious lesions.   Musculoskeletal: Positive for back pain, joint pain and muscle weakness. Negative for arthritis, falls and myalgias.   Gastrointestinal: Negative for abdominal pain, constipation, diarrhea, dysphagia and hematemesis.   Genitourinary: Negative for bladder incontinence, dysuria and frequency.   Neurological: Positive for weakness. Negative for aphonia, disturbances in coordination and dizziness.   Psychiatric/Behavioral: Negative for altered mental status, depression, memory loss and substance abuse. The patient does not have insomnia  and is not nervous/anxious.          Medications:     Allergies:   Allergies   Allergen Reactions   • Atorvastatin Other (See Comments)     LEG CRAMPS     • Amoxicillin Rash   • Escitalopram Rash   • Nabumetone Rash   • Niacin Er Rash   • Penicillin G Rash   • Penicillins Rash   • Simvastatin Rash       Current Meds:   Current Facility-Administered Medications:   •  acetaminophen (TYLENOL) tablet 650 mg, 650 mg, Oral, Q4H PRN **OR** acetaminophen (TYLENOL) suppository 650 mg, 650 mg, Rectal, Q4H PRN, Beni Urrutia MD  •  apixaban (ELIQUIS) tablet 5 mg, 5 mg, Oral, Q12H, Beni Urrutia MD  •  ascorbic acid (VITAMIN C) tablet 500 mg, 500 mg, Oral, Daily, Beni Urrutia MD  •  aspirin EC tablet 81 mg, 81 mg, Oral, Daily, Beni Urrutia MD  •  bisacodyl (DULCOLAX) suppository 10 mg, 10 mg, Rectal, Daily, Beni Urrutia MD  •  carvedilol (COREG) tablet 25 mg, 25 mg, Oral, BID With Meals, Beni Urrutia MD  •  ceFAZolin in 0.9% normal saline (ANCEF) IVPB solution 2 g, 2 g, Intravenous, Q8H, Beni Urrutia MD  •  cloNIDine (CATAPRES) tablet 0.1 mg, 0.1 mg, Oral, Q4H PRN, Beni Urrutia MD  •  Diclofenac Sodium (VOLTAREN) 1 % gel 4 g, 4 g, Topical, 4x Daily PRN, Beni Urrutia MD  •  docusate sodium (COLACE) capsule 100 mg, 100 mg, Oral, BID, Beni Urrutia MD  •  droperidol (INAPSINE) injection 1.25 mg, 1.25 mg, Intravenous, Q6H PRN, Beni Urrutia MD  •  DULoxetine (CYMBALTA) DR capsule 60 mg, 60 mg, Oral, Daily, Beni Urrutia MD  •  famotidine (PEPCID) tablet 40 mg, 40 mg, Oral, Daily, Beni Urrutia MD  •  ferrous gluconate tablet 324 mg, 324 mg, Oral, Daily With Breakfast, Beni Urrutia MD  •  fluticasone (FLONASE) 50 MCG/ACT nasal spray 1 spray, 1 spray, Nasal, Daily, Beni Urrutia MD  •  furosemide (LASIX) tablet 40 mg, 40 mg, Oral, Daily, Beni Urrutia MD  •  hydrALAZINE (APRESOLINE)  tablet 10 mg, 10 mg, Oral, Q8H, Francie Golden, DANIELA  •  HYDROcodone-acetaminophen (NORCO) 7.5-325 MG per tablet 1 tablet, 1 tablet, Oral, Q8H PRN, Beni Urrutia MD  •  hydrocortisone-bacitracin-zinc oxide-nystatin (MAGIC BARRIER) ointment 1 application, 1 application, Topical, PRN, Beni Urrutia MD  •  isosorbide mononitrate (IMDUR) 24 hr tablet 60 mg, 60 mg, Oral, Daily, Beni Urrutia MD  •  labetalol (NORMODYNE,TRANDATE) injection 10 mg, 10 mg, Intravenous, Q4H PRN, Beni Urrutia MD  •  levothyroxine (SYNTHROID, LEVOTHROID) tablet 75 mcg, 75 mcg, Oral, Q AM, Beni Urrutia MD  •  lidocaine (LIDODERM) 5 % 1 patch, 1 patch, Transdermal, Q24H, Beni Urrutia MD  •  lisinopril (PRINIVIL,ZESTRIL) tablet 10 mg, 10 mg, Oral, Q24H, rFancie Golden, DANIELA  •  magnesium hydroxide (MILK OF MAGNESIA) suspension 10 mL, 10 mL, Oral, Daily PRN, Beni Urrutia MD  •  magnesium oxide (MAG-OX) tablet 400 mg, 400 mg, Oral, Daily, Beni Urrutia MD  •  methylnaltrexone (RELISTOR) injection 12 mg, 12 mg, Subcutaneous, Q48H PRN, Francie Golden, DANIELA  •  multivitamin with minerals 1 tablet, 1 tablet, Oral, Daily, Beni Urrutia MD  •  nitroglycerin (NITROSTAT) SL tablet 0.4 mg, 0.4 mg, Sublingual, Q5 Min PRN, Beni Urrutia MD  •  ondansetron (ZOFRAN) tablet 4 mg, 4 mg, Oral, Q6H PRN **OR** ondansetron (ZOFRAN) injection 4 mg, 4 mg, Intravenous, Q6H PRN, Beni Urrutia MD  •  polyethylene glycol (MIRALAX) packet 17 g, 17 g, Oral, BID, Francie Golden, DANIELA  •  saccharomyces boulardii (FLORASTOR) capsule 250 mg, 250 mg, Oral, BID, Beni Urrutia MD  •  sennosides-docusate (PERICOLACE) 8.6-50 MG per tablet 2 tablet, 2 tablet, Oral, BID, Beni Urrutia MD  •  sodium chloride 0.9 % flush 10 mL, 10 mL, Intravenous, Q12H, Beni Urrutia MD  •  sodium chloride 0.9 % flush 10 mL, 10 mL, Intravenous, PRN,  "Beni Urrutia MD  •  sucralfate (CARAFATE) tablet 1 g, 1 g, Oral, 4x Daily AC & at Bedtime, Beni Urrutia MD  •  thiamine (VITAMIN B-1) tablet 100 mg, 100 mg, Oral, Daily, Beni Urrutia MD  •  traMADol (ULTRAM) tablet 100 mg, 100 mg, Oral, TID PRN, Beni Urrutia MD    Data:     Code Status:    There are no questions and answers to display.       Family History   Problem Relation Age of Onset   • Cancer Mother    • Heart disease Father        Social History     Socioeconomic History   • Marital status:    Tobacco Use   • Smoking status: Never Smoker   • Smokeless tobacco: Never Used   Vaping Use   • Vaping Use: Never used   Substance and Sexual Activity   • Alcohol use: No   • Drug use: Never   • Sexual activity: Defer       Vitals:  Ht 167.6 cm (66\")   Wt 85 kg (187 lb 4.8 oz)   LMP  (LMP Unknown)   BMI 30.23 kg/m²   T 97.6 P 60 R 16 /61 Sp02 95% (room air)      I/O (24Hr):  No intake or output data in the 24 hours ending 07/05/22 1254    Labs and imaging:      Recent Results (from the past 12 hour(s))   Basic Metabolic Panel    Collection Time: 07/05/22  4:51 AM    Specimen: Blood   Result Value Ref Range    Glucose 98 65 - 99 mg/dL    BUN 14 8 - 23 mg/dL    Creatinine 0.69 0.57 - 1.00 mg/dL    Sodium 139 136 - 145 mmol/L    Potassium 4.0 3.5 - 5.2 mmol/L    Chloride 102 98 - 107 mmol/L    CO2 31.0 (H) 22.0 - 29.0 mmol/L    Calcium 9.0 8.6 - 10.5 mg/dL    BUN/Creatinine Ratio 20.3 7.0 - 25.0    Anion Gap 6.0 5.0 - 15.0 mmol/L    eGFR 94.7 >60.0 mL/min/1.73   Sedimentation Rate    Collection Time: 07/05/22  4:51 AM    Specimen: Blood   Result Value Ref Range    Sed Rate 75 (H) 0 - 30 mm/hr   C-reactive Protein    Collection Time: 07/05/22  4:51 AM    Specimen: Blood   Result Value Ref Range    C-Reactive Protein 4.27 (H) 0.00 - 0.50 mg/dL   CBC Auto Differential    Collection Time: 07/05/22  4:51 AM    Specimen: Blood   Result Value Ref Range    WBC 3.14 (L) 3.40 - " 10.80 10*3/mm3    RBC 3.11 (L) 3.77 - 5.28 10*6/mm3    Hemoglobin 8.0 (L) 12.0 - 15.9 g/dL    Hematocrit 27.4 (L) 34.0 - 46.6 %    MCV 88.1 79.0 - 97.0 fL    MCH 25.7 (L) 26.6 - 33.0 pg    MCHC 29.2 (L) 31.5 - 35.7 g/dL    RDW 17.1 (H) 12.3 - 15.4 %    RDW-SD 54.8 (H) 37.0 - 54.0 fl    MPV 9.8 6.0 - 12.0 fL    Platelets 190 140 - 450 10*3/mm3    Neutrophil % 45.0 42.7 - 76.0 %    Lymphocyte % 33.4 19.6 - 45.3 %    Monocyte % 12.4 (H) 5.0 - 12.0 %    Eosinophil % 8.3 (H) 0.3 - 6.2 %    Basophil % 0.6 0.0 - 1.5 %    Immature Grans % 0.3 0.0 - 0.5 %    Neutrophils, Absolute 1.41 (L) 1.70 - 7.00 10*3/mm3    Lymphocytes, Absolute 1.05 0.70 - 3.10 10*3/mm3    Monocytes, Absolute 0.39 0.10 - 0.90 10*3/mm3    Eosinophils, Absolute 0.26 0.00 - 0.40 10*3/mm3    Basophils, Absolute 0.02 0.00 - 0.20 10*3/mm3    Immature Grans, Absolute 0.01 0.00 - 0.05 10*3/mm3    nRBC 0.0 0.0 - 0.2 /100 WBC           Physical Examination:        Physical Exam  Vitals and nursing note reviewed.   Constitutional:       Appearance: Normal appearance.   HENT:      Head: Normocephalic and atraumatic.      Right Ear: External ear normal.      Left Ear: External ear normal.      Nose: Nose normal.      Mouth/Throat:      Mouth: Mucous membranes are moist.      Pharynx: Oropharynx is clear.   Eyes:      Extraocular Movements: Extraocular movements intact.      Conjunctiva/sclera: Conjunctivae normal.      Pupils: Pupils are equal, round, and reactive to light.   Cardiovascular:      Rate and Rhythm: Normal rate and regular rhythm.      Pulses: Normal pulses.      Heart sounds: Normal heart sounds.   Pulmonary:      Effort: Pulmonary effort is normal.      Breath sounds: Normal breath sounds.   Abdominal:      General: Bowel sounds are normal.      Palpations: Abdomen is soft.   Musculoskeletal:      Cervical back: Normal range of motion and neck supple.      Right lower leg: Edema present.      Left lower leg: Edema present.      Comments: Generalized  weakness  Arthritis to fingers   Skin:     Comments: Dressing c/di  Wound vac intact   Neurological:      Mental Status: She is alert and oriented to person, place, and time.   Psychiatric:         Mood and Affect: Mood normal.         Behavior: Behavior normal.           Assessment:           * No active hospital problems. *    Past Medical History:   Diagnosis Date   • Age-related osteoporosis with current pathological fracture 5/27/2020   • Arthritis    • Asthma    • Bilateral bunions 12/23/2020   • Cancer ([facility])    • Cardiac pacemaker syndrome 12/23/2020    Overview:  - heart block - implanted 11/16   • Charcot's joint of foot, left 12/23/2020   • Chronic deep vein thrombosis (DVT) of right lower extremity ([facility]) 6/23/2021   • Chronic pain syndrome 6/22/2021   • Chronic sinusitis    • COPD (chronic obstructive pulmonary disease) ([facility])    • Coronary artery disease    • 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   • Elevated cholesterol    • Eustachian tube dysfunction    • Heart disease    • Herpes simplex    • History of transfusion    • Hyperlipidemia    • Hypertension    • Hypothyroidism 12/23/2020   • Intrinsic asthma 12/23/2020   • Knee dislocation    • Labral tear of right hip joint    • Laryngitis sicca    • Laryngitis, chronic    • Left carotid bruit 3/9/2016   • MI (myocardial infarction) ([facility])    • Myalgia due to statin 6/25/2019   • Open wound of right hip 9/14/2021   • Osteomyelitis of right femur ([facility]) 7/6/2021   • Otorrhea    • Pacemaker 11/17/2016   • Primary osteoarthritis of left knee 12/23/2020   • Psoriasis vulgaris 12/23/2020   • S/P coronary artery stent placement 3/9/2016   • Sensorineural hearing loss    • Seropositive rheumatoid arthritis of multiple sites ([facility]) 12/27/2019    Overview:  -myochrysine '93-'96 -methotrexate '96--->11/98;r/s  restarted 2/99--> 8/14 (anemia) -sulfasalazine- not effective -penicillamine 6/98-->10/98; no effect -leflunomide 11/98--> - Humira '13-->didn't take -  "Enbrel 12/14-->3/15- no effect!   Last Assessment & Plan:  - \"aching all over\" because she had to be off her anti-rheumatic drugs for 2 weeks in preparation for her R knee surgery - he   • Sick sinus syndrome ([facility]) 12/27/2019   • Sjogren's disease ([facility])    • Spondylolisthesis of lumbar region 1/17/2018   • Syncope, recurrent 2/8/2021        Plan:        1. Right hip abscess  2. MSSA wound infection/possible osteomyelitis  3. Rheumatoid arthritis  4. Chronic anticoagulation  5. RLE DVT (chronic)  6. HTN  7. Chronic Pain   8. Chronic Indwelling kay catheter  9. Hypothyroidism  10. UTI - yeast  11. Multifactorial anemia      Continue current treatment. Monitor counts. Increase activity. Labs Thursday. Continue pain control efforts. Wound care per wound care team. Maintain patient safety. Aggressive therapies as tolerated.  Continue blood pressure control efforts. Continue IV antibiotics under direction of ID. Discharge planning.       Electronically signed by DANIELA Hill on 7/5/2022 at 12:54 CDT     "

## 2022-07-06 LAB — SARS-COV-2 RNA PNL SPEC NAA+PROBE: NOT DETECTED

## 2022-07-06 PROCEDURE — 87635 SARS-COV-2 COVID-19 AMP PRB: CPT | Performed by: INTERNAL MEDICINE

## 2022-07-06 PROCEDURE — 97110 THERAPEUTIC EXERCISES: CPT

## 2022-07-06 PROCEDURE — 97535 SELF CARE MNGMENT TRAINING: CPT

## 2022-07-06 PROCEDURE — 25010000002 CEFAZOLIN PER 500 MG: Performed by: INTERNAL MEDICINE

## 2022-07-06 PROCEDURE — 25010000002 HYDROMORPHONE 1 MG/ML SOLUTION

## 2022-07-06 NOTE — DISCHARGE SUMMARY
ARGELIA Guerrero APRN      Internal Medicine Discharge Summary    Patient ID: Tawny Shin  MRN: [id number]     Acct:  [id number]       Patient's PCP: Leta Julian DO    Admit Date: 6/14/2022     Discharge Date:   7/6/2022    Admitting Physician: Beni Urrutia MD    Discharge Physician: DANIELA Solorzano     Active Discharge Diagnoses:  Right hip abscess  MSSA wound infection/possible osteomyelitis  Rheumatoid arthritis  Chronic anticoagulation  RLE DVT (chronic)  HTN  Chronic Pain   Chronic Indwelling kay catheter  Hypothyroidism  UTI - yeast  Multifactorial anemia        Primary Problem  <principal problem not specified>      Hospital Problems    * No active hospital problems. 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    Elevated cholesterol     Eustachian tube dysfunction     Heart disease     Herpes simplex     History of transfusion     Hyperlipidemia     Hypertension     Hypothyroidism 12/23/2020    Intrinsic asthma 12/23/2020    Knee dislocation     Labral tear of right hip joint     Laryngitis sicca     Laryngitis, chronic     Left carotid bruit 3/9/2016    MI (myocardial infarction) (HCC)     Myalgia due to statin 6/25/2019    Open wound of right hip 9/14/2021    Osteomyelitis of right femur (HCC) 7/6/2021    Otorrhea     Pacemaker 11/17/2016    Primary osteoarthritis of left knee 12/23/2020    Psoriasis  "vulgaris 12/23/2020    S/P coronary artery stent placement 3/9/2016    Sensorineural hearing loss     Seropositive rheumatoid arthritis of multiple sites (HCC) 12/27/2019    Overview:  -myochrysine '93-'96 -methotrexate '96--->11/98;r/s  restarted 2/99--> 8/14 (anemia) -sulfasalazine- not effective -penicillamine 6/98-->10/98; no effect -leflunomide 11/98--> - Humira '13-->didn't take - Enbrel 12/14-->3/15- no effect!   Last Assessment & Plan:  - \"aching all over\" because she had to be off her anti-rheumatic drugs for 2 weeks in preparation for her R knee surgery - he    Sick sinus syndrome (HCC) 12/27/2019    Sjogren's disease ([facility])     Spondylolisthesis of lumbar region 1/17/2018    Syncope, recurrent 2/8/2021       The patient was seen and examined on the day of discharge and this discharge summary is in conjunction with any daily progress note from day of discharge.    Code Status:    There are no questions and answers to display.       Hospital Course:   Tawny Shin is a  66 y.o.  female who presented to [facility] ED from a local SNF where she was staying for wound care and rehabilitation with complaints of right hip incision site draining, swollen and painful. She is currently bedbound at the nursing home. Her ED work up revelaed a large fluid collection lateral to the right hip at the level of the greater trochanter subcutaneous tissues measuring approimately 7.8x4.0 x 8.1 cm with adjacent hazy stranding.  Findings were believed to represent abscess with surrounding cellulitis. Mrs. Shin did undergo I and D on 10/24/2021 per Dr. Turner after she was admitted for bacteremia.  She has also been followed by infectious disease Dr. Bang. General surgery was consulted, the patient was taken to the OR on 6/4 by Dr. Salcido.  The CT scan showed inflamed fluid collection.  There was noted to be some necrotic tissue in there, the patient had significant irrigation and debridement of the necrotic tissue.  The " patient went to OR again with Dr. Turner, bone culture obtained.  Wound cleaned copiously irrigated with vancomycin-containing saline. Infectious disease was consulted, she has been followed by them periodically for history of infections in this area in the past.  Cultures have again grown staph aureus.  Patient's urine is growing Pseudomonas and E. Coli. Plastic surgery was consulted, as they did her previous flap.  They recommended against the wound VAC and to reconsult orthopedic surgery.  Patient's MRI show concern for osteomyelitis as well as abscess formation in the deeper tissues. She did have an episode of confusion on 6/11/2022 and EEG and MRI were both ordered. MRI showed no acute processes, but did show that there were some inspissated secretions and fungal sinusitis would be in this differential.  If continues to be an issues may have to consider ENT consult to evaluate this further. She was transferred to our facility for wound care and a minimal of 4 weeks of cefazolin per ID. During her stay with at [facility] she was followed by our wound care team for wound vac therapy.  She continues on cefazolin and will be discharged back to the SNF to continue Cefazolin 2G IV every 8 hours through July 19-22 (6 week course of treatment) then begin cephalexin 1000mg orally every 12 hours for chronic suppression on July 20-22.  She must also have labs monitored (CBCm CMP, CRP) at least every Monday or Tuesday weekly while on intravenous antibiotic treatment.  She should also have her PICC line removed after she completes her IV cefazolin on July 19-22 per Dr. Ohara.  She is struggling with morning stiffness and also needs close follow up with her Rheumatoidologist for follow up for her arthritis. She will be discharged with her wound vac to continue wound care at the nursing home.  She is being transferred to a local SNF today in stable condition.        Review of Systems   Constitutional: Positive for  malaise/fatigue. Negative for chills, decreased appetite, weight gain and weight loss.   HENT: Negative for congestion, ear discharge, hoarse voice and tinnitus.    Eyes: Negative for blurred vision, discharge, visual disturbance and visual halos.   Cardiovascular: Negative for chest pain, claudication, dyspnea on exertion, irregular heartbeat, leg swelling, orthopnea and paroxysmal nocturnal dyspnea.   Respiratory: Negative for cough, shortness of breath, sputum production and wheezing.    Endocrine: Negative for cold intolerance, heat intolerance and polyuria.   Hematologic/Lymphatic: Negative for adenopathy. Does not bruise/bleed easily.   Skin: Negative for dry skin, itching and suspicious lesions.   Musculoskeletal: Positive for back pain, joint pain and muscle weakness. Negative for arthritis, falls and myalgias.   Gastrointestinal: Negative for abdominal pain, constipation, diarrhea, dysphagia and hematemesis.   Genitourinary: Negative for bladder incontinence, dysuria and frequency.   Neurological: Positive for weakness. Negative for aphonia, disturbances in coordination and dizziness.   Psychiatric/Behavioral: Negative for altered mental status, depression, memory loss and substance abuse. The patient does not have insomnia and is not nervous/anxious.        Physical Exam  Vitals and nursing note reviewed.   Constitutional:       Appearance: Normal appearance.   HENT:      Head: Normocephalic and atraumatic.      Right Ear: External ear normal.      Left Ear: External ear normal.      Nose: Nose normal.      Mouth/Throat:      Mouth: Mucous membranes are moist.      Pharynx: Oropharynx is clear.   Eyes:      Extraocular Movements: Extraocular movements intact.      Conjunctiva/sclera: Conjunctivae normal.      Pupils: Pupils are equal, round, and reactive to light.   Cardiovascular:      Rate and Rhythm: Normal rate and regular rhythm.      Pulses: Normal pulses.      Heart sounds: Normal heart sounds.    Pulmonary:      Effort: Pulmonary effort is normal.      Breath sounds: Normal breath sounds.   Abdominal:      General: Bowel sounds are normal.      Palpations: Abdomen is soft.   Musculoskeletal:      Cervical back: Normal range of motion and neck supple.      Right lower leg: Edema present.      Left lower leg: Edema present.      Comments: Generalized weakness  Arthritis to fingers   Skin:     Comments: Dressing c/di  Wound vac intact   Neurological:      Mental Status: She is alert and oriented to person, place, and time.   Psychiatric:         Mood and Affect: Mood normal.         Behavior: Behavior normal.     T 98.3 HR 75 RR 16 /69 SPO2 94%     Consults:     Dr. Elie Copeland Infectious Disease      Disposition: SNF    Discharged Condition: Stable    Follow Up:  Labs at least weekly per ID CBC, CMP, CRP while on IV ABX  Rheumatoidologist for arthritis     Diet: Diet Regular; Thin    Discharge Medications:      Discharge Medications        ASK your doctor about these medications        Instructions Start Date   acetaminophen 325 MG tablet  Commonly known as: TYLENOL   650 mg, Oral, Every 6 Hours PRN      apixaban 5 MG tablet tablet  Commonly known as: ELIQUIS   5 mg, Oral, 2 Times Daily      ascorbic acid 500 MG tablet  Commonly known as: VITAMIN C   500 mg, Oral, Daily      aspirin 81 MG EC tablet   81 mg, Oral, Daily      carvedilol 25 MG tablet  Commonly known as: COREG   25 mg, Oral, 2 Times Daily With Meals      ceFAZolin in 0.9% normal saline 2 GM/ 100 mL solution IVPB  Commonly known as: ANCEF   2 g, Intravenous, Every 8 Hours      collagenase 250 UNIT/GM ointment   1 application, Topical, Every 24 Hours Scheduled      Diclofenac Sodium 1 % gel gel  Commonly known as: VOLTAREN   4 g, Topical, 4 Times Daily PRN      docusate sodium 100 MG capsule   100 mg, Oral, 2 Times Daily      DULoxetine 60 MG capsule  Commonly known as: CYMBALTA   60 mg, Oral, Daily      famotidine 40 MG tablet  Commonly  known as: PEPCID   40 mg, Oral, Daily      ferrous sulfate 324 (65 Fe) MG tablet delayed-release EC tablet   324 mg, Oral, Daily With Breakfast      fluticasone 50 MCG/ACT nasal spray  Commonly known as: FLONASE   1 spray, Nasal, Daily      folic acid 1 MG tablet  Commonly known as: FOLVITE   1 mg, Oral, Daily      furosemide 40 MG tablet  Commonly known as: LASIX   40 mg, Oral, Daily      HYDROcodone-acetaminophen 7.5-325 MG per tablet  Commonly known as: NORCO   1 tablet, Oral, Every 8 Hours PRN      isosorbide mononitrate 60 MG 24 hr tablet  Commonly known as: IMDUR   60 mg, Oral, 2 Times Daily      levothyroxine 75 MCG tablet  Commonly known as: SYNTHROID, LEVOTHROID   75 mcg, Oral, Daily      Lidocaine 4 % patch   Apply externally, Every Night at Bedtime, To lower back      magnesium hydroxide 400 MG/5ML suspension  Commonly known as: MILK OF MAGNESIA   30 mL, Oral, Daily PRN      magnesium oxide 400 MG tablet  Commonly known as: MAG-OX   400 mg, Oral, Daily      Menthol-Zinc Oxide 0.45-20 % ointment   1 application, Apply externally, Apply to buttock      Movantik 25 MG tablet  Generic drug: Naloxegol Oxalate   25 mg, Oral, Every Morning      multivitamin with minerals tablet tablet   1 tablet, Oral, Daily      nitroglycerin 0.4 MG SL tablet  Commonly known as: Nitrostat   0.4 mg, Sublingual, Every 5 Minutes PRN, Take no more than 3 doses in 15 minutes.      ondansetron 4 MG tablet  Commonly known as: ZOFRAN   4 mg, Oral, Every 6 Hours PRN      polyethylene glycol 17 GM/SCOOP powder  Commonly known as: MIRALAX   17 g, Oral, Daily      saccharomyces boulardii 250 MG capsule  Commonly known as: Florastor   250 mg, Oral, Daily      sennosides-docusate 8.6-50 MG per tablet  Commonly known as: PERICOLACE   2 tablets, Oral, 2 Times Daily      sucralfate 1 g tablet  Commonly known as: CARAFATE   1 g, Oral, 4 Times Daily Before Meals & Nightly      traMADol 50 MG tablet  Commonly known as: ULTRAM   100 mg, Oral, 3  Times Daily PRN      valACYclovir 500 MG tablet  Commonly known as: VALTREX   500 mg, Oral, Daily      VITAMIN B-1 PO   100 mg, Oral, Daily      sam's amazing butt cream   1 application, Topical, As Needed, Recipe Contains: 1:1:1:1 of hydrocortisone 1% oint, bacitracin 500 unit/gram oint, zinc 20% oint, nystatin 100,000 unit/gram oint               Time Spent on discharge is  32 minutes in patient examination, evaluation, patient/family counseling as well as medication reconciliation, prescriptions for required medications, discharge plan and follow up.     Electronically signed by DANIELA Solorzano on 7/6/2022 at 10:33 CDT     I have discussed the care of Tawny Shin, including pertinent history and exam findings, with the nurse practitioner.    I have seen and examined the patient and the key elements of all parts of the encounter have been performed by me.  I agree with the assessment, plan and orders as documented by DANIELA Umana, after I modified the exam findings and the plan of treatments and the final version is my approved version of the assessment.        Electronically signed by Beni Urrutia MD on 7/7/2022 at 21:51 CDT

## 2022-07-18 LAB — FUNGUS WND CULT: NORMAL

## 2022-07-19 ENCOUNTER — OFFICE VISIT (OUTPATIENT)
Dept: WOUND CARE | Facility: HOSPITAL | Age: 67
End: 2022-07-19

## 2022-07-19 ENCOUNTER — LAB REQUISITION (OUTPATIENT)
Dept: LAB | Facility: HOSPITAL | Age: 67
End: 2022-07-19

## 2022-07-19 DIAGNOSIS — S50.901A UNSPECIFIED SUPERFICIAL INJURY OF RIGHT ELBOW, INITIAL ENCOUNTER: ICD-10-CM

## 2022-07-19 DIAGNOSIS — L97.812 NON-PRESSURE CHRONIC ULCER OF OTHER PART OF RIGHT LOWER LEG WITH FAT LAYER EXPOSED: ICD-10-CM

## 2022-07-19 DIAGNOSIS — S71.001A UNSPECIFIED OPEN WOUND, RIGHT HIP, INITIAL ENCOUNTER: ICD-10-CM

## 2022-07-19 DIAGNOSIS — L89.313 PRESSURE ULCER OF RIGHT BUTTOCK, STAGE 3: ICD-10-CM

## 2022-07-19 DIAGNOSIS — Z00.00 ENCOUNTER FOR GENERAL ADULT MEDICAL EXAMINATION WITHOUT ABNORMAL FINDINGS: ICD-10-CM

## 2022-07-19 PROCEDURE — 97605 NEG PRS WND THER DME<=50SQCM: CPT

## 2022-07-19 PROCEDURE — 99214 OFFICE O/P EST MOD 30 MIN: CPT | Performed by: SURGERY

## 2022-07-19 PROCEDURE — 11042 DBRDMT SUBQ TIS 1ST 20SQCM/<: CPT | Performed by: SURGERY

## 2022-07-19 PROCEDURE — 87147 CULTURE TYPE IMMUNOLOGIC: CPT | Performed by: SURGERY

## 2022-07-19 PROCEDURE — 87205 SMEAR GRAM STAIN: CPT | Performed by: SURGERY

## 2022-07-19 PROCEDURE — 87070 CULTURE OTHR SPECIMN AEROBIC: CPT | Performed by: SURGERY

## 2022-07-19 PROCEDURE — G0463 HOSPITAL OUTPT CLINIC VISIT: HCPCS

## 2022-07-19 PROCEDURE — 10060 I&D ABSCESS SIMPLE/SINGLE: CPT | Performed by: SURGERY

## 2022-07-19 PROCEDURE — 97597 DBRDMT OPN WND 1ST 20 CM/<: CPT | Performed by: SURGERY

## 2022-07-19 PROCEDURE — 87077 CULTURE AEROBIC IDENTIFY: CPT | Performed by: SURGERY

## 2022-07-19 PROCEDURE — 87186 SC STD MICRODIL/AGAR DIL: CPT | Performed by: SURGERY

## 2022-07-19 PROCEDURE — 97598 DBRDMT OPN WND ADDL 20CM/<: CPT | Performed by: SURGERY

## 2022-07-22 LAB
MYCOBACTERIUM SPEC CULT: NORMAL
NIGHT BLUE STAIN TISS: NORMAL
NIGHT BLUE STAIN TISS: NORMAL

## 2022-07-23 LAB
BACTERIA SPEC AEROBE CULT: ABNORMAL
BACTERIA SPEC AEROBE CULT: ABNORMAL
GRAM STN SPEC: ABNORMAL

## 2022-07-26 ENCOUNTER — OFFICE VISIT (OUTPATIENT)
Dept: WOUND CARE | Facility: HOSPITAL | Age: 67
End: 2022-07-26

## 2022-07-26 DIAGNOSIS — S50.901A UNSPECIFIED SUPERFICIAL INJURY OF RIGHT ELBOW, INITIAL ENCOUNTER: ICD-10-CM

## 2022-07-26 DIAGNOSIS — L89.313 PRESSURE ULCER OF RIGHT BUTTOCK, STAGE 3: ICD-10-CM

## 2022-07-26 DIAGNOSIS — L97.812 NON-PRESSURE CHRONIC ULCER OF OTHER PART OF RIGHT LOWER LEG WITH FAT LAYER EXPOSED: ICD-10-CM

## 2022-07-26 DIAGNOSIS — S71.001A UNSPECIFIED OPEN WOUND, RIGHT HIP, INITIAL ENCOUNTER: ICD-10-CM

## 2022-07-26 PROCEDURE — 11042 DBRDMT SUBQ TIS 1ST 20SQCM/<: CPT | Performed by: SURGERY

## 2022-07-26 PROCEDURE — 97597 DBRDMT OPN WND 1ST 20 CM/<: CPT | Performed by: SURGERY

## 2022-07-26 PROCEDURE — 97605 NEG PRS WND THER DME<=50SQCM: CPT

## 2022-08-02 ENCOUNTER — OFFICE VISIT (OUTPATIENT)
Dept: WOUND CARE | Facility: HOSPITAL | Age: 67
End: 2022-08-02

## 2022-08-02 DIAGNOSIS — A49.9 BACTERIAL INFECTION, UNSPECIFIED: ICD-10-CM

## 2022-08-02 DIAGNOSIS — S71.001D UNSPECIFIED OPEN WOUND, RIGHT HIP, SUBSEQUENT ENCOUNTER: ICD-10-CM

## 2022-08-02 DIAGNOSIS — S50.901D: ICD-10-CM

## 2022-08-02 DIAGNOSIS — L89.313 PRESSURE ULCER OF RIGHT BUTTOCK, STAGE 3: ICD-10-CM

## 2022-08-02 PROCEDURE — 11042 DBRDMT SUBQ TIS 1ST 20SQCM/<: CPT | Performed by: NURSE PRACTITIONER

## 2022-08-03 ENCOUNTER — TELEPHONE (OUTPATIENT)
Dept: INTERNAL MEDICINE | Facility: CLINIC | Age: 67
End: 2022-08-03

## 2022-08-03 NOTE — TELEPHONE ENCOUNTER
Dr. Chaidez' office had faxed over lab results, asking Dr. Julian to review and address.  Dr. Julian reviewed and noted that she hasn't seen her since March.  After looking further into chart, noted that she is at [facility].  Dr. Julian says NH provider should review and order further labs, such as iron, TIBC, retic count, B12 and folate.  Called [facility] and talked with Jennifer on the 400 muro.  She says that the APRN, Carol, did labs on her on 7/17 and Hgb was 8.6, currently 8.5, so they are aware of her anemia.  Jennifer says she will make APRN aware that we had called, in case she wishes to order further labs.

## 2022-08-09 ENCOUNTER — OFFICE VISIT (OUTPATIENT)
Dept: WOUND CARE | Facility: HOSPITAL | Age: 67
End: 2022-08-09

## 2022-08-09 DIAGNOSIS — A49.9 BACTERIAL INFECTION, UNSPECIFIED: ICD-10-CM

## 2022-08-09 DIAGNOSIS — S50.901D: ICD-10-CM

## 2022-08-09 DIAGNOSIS — L89.313 PRESSURE ULCER OF RIGHT BUTTOCK, STAGE 3: ICD-10-CM

## 2022-08-09 DIAGNOSIS — S71.001D UNSPECIFIED OPEN WOUND, RIGHT HIP, SUBSEQUENT ENCOUNTER: ICD-10-CM

## 2022-08-09 PROCEDURE — 99213 OFFICE O/P EST LOW 20 MIN: CPT | Performed by: SURGERY

## 2022-08-09 PROCEDURE — 97597 DBRDMT OPN WND 1ST 20 CM/<: CPT | Performed by: SURGERY

## 2022-08-15 ENCOUNTER — OFFICE VISIT (OUTPATIENT)
Dept: WOUND CARE | Facility: HOSPITAL | Age: 67
End: 2022-08-15

## 2022-08-15 DIAGNOSIS — A49.9 BACTERIAL INFECTION, UNSPECIFIED: ICD-10-CM

## 2022-08-15 DIAGNOSIS — L89.313 PRESSURE ULCER OF RIGHT BUTTOCK, STAGE 3: ICD-10-CM

## 2022-08-15 DIAGNOSIS — L89.893 PRESSURE ULCER OF OTHER SITE, STAGE 3: ICD-10-CM

## 2022-08-15 PROCEDURE — 11042 DBRDMT SUBQ TIS 1ST 20SQCM/<: CPT | Performed by: NURSE PRACTITIONER

## 2022-08-15 NOTE — PROGRESS NOTES
[facility] - PODIATRY    Today's Date: 08/16/2022     Patient Name: Tawny Shin  MRN: [id number]  CSN: [id number]  PCP: Leta Julian DO  Referring Provider: No ref. provider found    SUBJECTIVE     Chief Complaint   Patient presents with   • Follow-up     Leta Julian DO pcp03/28/2022 3 month f/u non-diabetic foot/nail care- pt states bottoms of gotten a little better, side of right foot sore still not doing great, seeing wound care for that- pt denies pain- pt presents in wheel chair, swollen feet, long thick nails. Sore on top of 2nd toe left foot     HPI: Tawny Shin, a 67 y.o.female, comes to clinic as a(n) established patient complaining of thickened, irregular toenails of both feet and sore of right lateral ankle. Patient has h/o arthritis, asthma, bunions, CA, Heart block, Charcot joint of left foot, DVT, COPD, CAD, HLD, HTN, Hypothyroid, MI, pacemaker. Patient presents for nail care of thickened, irregular toenails of both feet. Patient continues to follow in outpatient wound care center. Resides at [facility]. Relies on wheelchair for mobilization. Admits pain at 8/10 level and described as stabbing, aching and sharp. Relates previous treatment(s) including treatment in [facility]. Patient continues Eliquis daily. Denies any constitutional symptoms. No other pedal complaints at this time.    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 12/23/2020   • Elevated cholesterol    • Eustachian tube dysfunction    • Heart disease    • Herpes simplex    • History of transfusion    • Hyperlipidemia    • Hypertension    • Hypothyroidism 12/23/2020   • Intrinsic asthma 12/23/2020   • Knee dislocation    • Labral tear of right hip joint    • Laryngitis sicca    • Laryngitis, chronic    • Left carotid bruit 3/9/2016   • MI (myocardial infarction) ([facility])    • Myalgia due to statin 6/25/2019   • Open wound of right hip 9/14/2021   • Osteomyelitis of right femur ([facility]) 7/6/2021   • Otorrhea    • Pacemaker 11/17/2016   • Primary osteoarthritis of left knee 12/23/2020   • Psoriasis vulgaris 12/23/2020   • S/P coronary artery stent placement 3/9/2016   • Sensorineural hearing loss    • Seropositive rheumatoid arthritis of multiple sites ([facility]) 12/27/2019    Overview:  -myochrysine '93-'96 -methotrexate '96--->11/98;r/s  restarted 2/99--> 8/14 (anemia) -sulfasalazine- not effective -penicillamine 6/98-->10/98; no effect -leflunomide 11/98--> - Humira '13-->didn't take - Enbrel 12/14-->3/15- no effect!   Last Assessment & Plan:  - \"aching all over\" because she had to be off her anti-rheumatic drugs for 2 weeks in preparation for her R knee surgery - he   • Sick sinus syndrome ([facility]) 12/27/2019   • Sjogren's disease ([facility])    • Spondylolisthesis of lumbar region 1/17/2018   • Syncope, recurrent 2/8/2021     Past Surgical History:   Procedure Laterality Date   • A-V CARDIAC PACEMAKER INSERTION  2016   • ATRIAL CARDIAC PACEMAKER INSERTION     • CARDIAC CATHETERIZATION     • CATARACT EXTRACTION     • CERVICAL CORPECTOMY N/A 3/3/2021    Procedure: CERVICAL 6 CORPECTOMY WITH TITANIUM CAGE WITH NEURO MONITORING;  Surgeon: Bandar Shea MD;  Location: [facility];  Service: Neurosurgery;  Laterality: N/A;   • COLONOSCOPY  11/08/2011    One fold in the ascending colon which showed ulcer otherwise normal exam   • COLONOSCOPY  11/12/2004    Normal exam " repeat in five years   • CORONARY ANGIOPLASTY WITH STENT PLACEMENT      X 2; 2013 & 2014   • ENDOSCOPY  07/10/2014    Normal exam   • FLAP LEG Right 9/14/2021    Procedure: RIGHT GLUTEAL FASCIOCUTANEOUS ADVANCEMENT FLAP AND RIGHT TENSOR FASCIAL JESSICA FLAP;  Surgeon: Amadeo Turner MD;  Location:  [facility];  Service: Plastics;  Laterality: Right;   • HIP ABDUCTION TENOTOMY BILATERAL Right 1/14/2021    Procedure: RIGHT HIP GLUTEUS MEDLUS / MINIMUS REPAIR, POSSIBLE ACHILLES ALLOGRAFT;  Surgeon: Nino Carlson MD;  Location:  [facility];  Service: Orthopedics;  Laterality: Right;   • INCISION AND DRAINAGE ABSCESS Right 6/4/2022    Procedure: INCISION AND DRAINAGE ABSCESS right hip;  Surgeon: Magda Salcido MD;  Location:  [facility];  Service: General;  Laterality: Right;   • INCISION AND DRAINAGE ABSCESS Right 6/10/2022    Procedure: RIGHT HIP INCISION AND DRAINAGE. MD NEEDS 3L VANC IRRIGATION, CURRETTES, DAICANS, KERLEX ROLLS;  Surgeon: Amadeo Turner MD;  Location:  [facility];  Service: Plastics;  Laterality: Right;   • INCISION AND DRAINAGE HIP Right 2/9/2021    Procedure: HIP INCISION AND DRAINAGE;  Surgeon: Nino Carlson MD;  Location:  [facility];  Service: Orthopedics;  Laterality: Right;   • INCISION AND DRAINAGE LEG Right 10/24/2021    Procedure: INCISION AND DRAINAGE LOWER EXTREMITY;  Surgeon: Amadeo Turner MD;  Location:  [facility];  Service: Plastics;  Laterality: Right;   • INCISION AND DRAINAGE OF WOUND Right 7/8/2021    Procedure: INCISION AND DRAINAGE WOUND RIGHT HIP;  Surgeon: James Huntley MD;  Location:  [facility];  Service: Orthopedics;  Laterality: Right;   • JOINT REPLACEMENT     • KYPHOPLASTY WITH BIOPSY Bilateral 10/26/2021    Procedure: THOARCIC 12 KYPHOPLASTY WITH BIOPSY;  Surgeon: Bandar Shea MD;  Location:  [facility];  Service: Neurosurgery;  Laterality: Bilateral;   • LEG DEBRIDEMENT Right 9/14/2021    Procedure: DEBRIDEMENT OF RIGHT HIP WOUND, RIGHT GLUTEAL  FASCIOCUTANEOUS ADVANCEMENT FLAP AND RIGHT TENSOR FASCIAL JESSICA FLAP;  Surgeon: Amadeo Turner MD;  Location:  [facility];  Service: Plastics;  Laterality: Right;   • LUMBAR DISCECTOMY Right 3/23/2021    Procedure: LUMBAR DISCECTOMY MICRO, Lumbar 1/2 right;  Surgeon: Bandar Shea MD;  Location:  [facility];  Service: Neurosurgery;  Laterality: Right;   • LUMBAR FUSION N/A 1/19/2018    Procedure: L3-4,L4-5 DECOMPRESSION, POSTERIOR SPINAL FUSION WITH INSTRUMENTATION;  Surgeon: Fortino Oropeza MD;  Location:  [facility];  Service:    • LUMBAR LAMINECTOMY WITH FUSION Left 1/17/2018    Procedure: LEFT L3-4 L4-5 LATERAL LUMBAR INTERBODY FUSION;  Surgeon: Fortino Oropeza MD;  Location:  [facility];  Service:    • MYRINGOTOMY W/ TUBES  09/04/2014    TUBES NO LONGER IN PLACE   • OTHER SURGICAL HISTORY      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 so hardware was removed and spacers were placed   • REPLACEMENT TOTAL KNEE Right      Family History   Problem Relation Age of Onset   • Cancer Mother    • Heart disease Father      Social History     Socioeconomic History   • Marital status:    Tobacco Use   • Smoking status: Never Smoker   • Smokeless tobacco: Never Used   Vaping Use   • Vaping Use: Never used   Substance and Sexual Activity   • Alcohol use: No   • Drug use: Never   • Sexual activity: Defer     Allergies   Allergen Reactions   • Atorvastatin Other (See Comments)     LEG CRAMPS     • Amoxicillin Rash   • Escitalopram Rash   • Nabumetone Rash   • Niacin Er Rash   • Penicillin G Rash   • Penicillins Rash   • Simvastatin Rash     Current Outpatient Medications   Medication Sig Dispense Refill   • acetaminophen (TYLENOL) 325 MG tablet Take 650 mg by mouth Every 6 (Six) Hours As Needed (mild pain).     • apixaban (ELIQUIS) 5 MG tablet tablet Take 1 tablet by mouth 2 (Two) Times a Day. 60 tablet    • ascorbic acid (VITAMIN C) 500 MG tablet Take 500 mg by mouth Daily.     • aspirin 81 MG EC tablet Take 81 mg by  mouth Daily.     • carvedilol (COREG) 25 MG tablet Take 1 tablet by mouth 2 (Two) Times a Day With Meals. 180 tablet 3   • collagenase 250 UNIT/GM ointment Apply 1 application topically to the appropriate area as directed Daily.     • Diclofenac Sodium (VOLTAREN) 1 % gel gel Apply 4 g topically to the appropriate area as directed 4 (Four) Times a Day As Needed.     • docusate sodium 100 MG capsule Take 1 capsule by mouth 2 (Two) Times a Day.     • DULoxetine (CYMBALTA) 60 MG capsule Take 60 mg by mouth Daily.     • famotidine (PEPCID) 40 MG tablet Take 40 mg by mouth Daily.     • ferrous sulfate 324 (65 Fe) MG tablet delayed-release EC tablet Take 324 mg by mouth Daily With Breakfast.     • fluticasone (FLONASE) 50 MCG/ACT nasal spray 1 spray into the nostril(s) as directed by provider Daily. 18.2 mL 5   • folic acid (FOLVITE) 1 MG tablet Take 1 mg by mouth Daily.     • furosemide (LASIX) 40 MG tablet Take 40 mg by mouth Daily.     • HYDROcodone-acetaminophen (NORCO) 7.5-325 MG per tablet Take 1 tablet by mouth Every 8 (Eight) Hours As Needed for Moderate Pain . 9 tablet 0   • Hydrocortisone (sam's amazing butt) cream Apply 1 application topically to the appropriate area as directed As Needed. Recipe Contains: 1:1:1:1 of hydrocortisone 1% oint, bacitracin 500 unit/gram oint, zinc 20% oint, nystatin 100,000 unit/gram oint     • isosorbide mononitrate (IMDUR) 60 MG 24 hr tablet Take 60 mg by mouth 2 (Two) Times a Day.     • levothyroxine (SYNTHROID, LEVOTHROID) 75 MCG tablet Take 1 tablet by mouth Daily. 90 tablet 3   • Lidocaine 4 % patch Apply  topically every night at bedtime. To lower back     • magnesium hydroxide (MILK OF MAGNESIA) 400 MG/5ML suspension Take 30 mL by mouth Daily As Needed for Constipation.     • magnesium oxide (MAG-OX) 400 MG tablet Take 400 mg by mouth Daily.     • Menthol-Zinc Oxide 0.45-20 % ointment Apply 1 application topically. Apply to buttock     • multivitamin with minerals tablet  tablet Take 1 tablet by mouth Daily.     • Naloxegol Oxalate (Movantik) 25 MG tablet Take 25 mg by mouth Every Morning.     • nitroglycerin (Nitrostat) 0.4 MG SL tablet Place 1 tablet under the tongue Every 5 (Five) Minutes As Needed for Chest Pain. Take no more than 3 doses in 15 minutes.  12   • ondansetron (ZOFRAN) 4 MG tablet Take 4 mg by mouth Every 6 (Six) Hours As Needed for Nausea or Vomiting.     • polyethylene glycol (MIRALAX) 17 GM/SCOOP powder Take 17 g by mouth Daily.     • saccharomyces boulardii (Florastor) 250 MG capsule Take 1 capsule by mouth Daily. 90 capsule 3   • sennosides-docusate (PERICOLACE) 8.6-50 MG per tablet Take 2 tablets by mouth 2 (Two) Times a Day.     • sucralfate (CARAFATE) 1 g tablet Take 1 g by mouth 4 (Four) Times a Day Before Meals & at Bedtime.     • Thiamine HCl (VITAMIN B-1 PO) Take 100 mg by mouth Daily.     • traMADol (ULTRAM) 50 MG tablet Take 100 mg by mouth 3 (Three) Times a Day As Needed for Moderate Pain .     • valACYclovir (VALTREX) 500 MG tablet Take 500 mg by mouth Daily.       No current facility-administered medications for this visit.     Review of Systems   Constitutional: Negative for chills and fever.   HENT: Negative for congestion.    Respiratory: Negative for shortness of breath.    Cardiovascular: Positive for leg swelling. Negative for chest pain.   Gastrointestinal: Negative for constipation, diarrhea, nausea and vomiting.   Musculoskeletal: Positive for arthralgias, back pain and gait problem.        Foot pain   Skin: Positive for wound.   Neurological: Negative for numbness.       OBJECTIVE     Vitals:    08/16/22 1012   Pulse: 74   SpO2: 98%       PHYSICAL EXAM  GEN:   Accompanied by family.     Foot/Ankle Exam:       General:   Appearance: appears stated age and healthy    Orientation: AAOx3    Affect: appropriate    Assistance: wheelchair    Shoe Gear:  Socks    VASCULAR      Right Foot Vascularity   Dorsalis pedis:  2+  Posterior tibial:   2+  Skin Temperature: warm    Edema Gradin+ and pitting  CFT:  3  Pedal Hair Growth:  Present  Varicosities: spider veins       Left Foot Vascularity   Dorsalis pedis:  2+  Posterior tibial:  2+  Skin Temperature: warm    Edema Gradin+ and pitting  CFT:  3  Pedal Hair Growth:  Present  Varicosities: spider veins        NEUROLOGIC     Right Foot Neurologic   Light touch sensation:  Diminished  Vibratory sensation:  Diminished  Hot/Cold sensation: diminished    Protective Sensation using [place] Monofilament:  10     Left Foot Neurologic   Light touch sensation:  Diminished  Vibratory sensation:  Diminished  Hot/cold sensation: diminished    Protective Sensation using [place]an Monofilament:  10     MUSCULOSKELETAL      Right Foot Musculoskeletal   Ecchymosis:  None  Tenderness: none    Arch:  Normal  Hammertoe:  Second toe, third toe, fourth toe and fifth toe  Hallux valgus: Yes       Left Foot Musculoskeletal   Ecchymosis:  None  Tenderness: none    Arch:  Normal  Hammertoe:  Second toe, third toe, fourth toe and fifth toe     MUSCLE STRENGTH     Right Foot Muscle Strength   Foot dorsiflexion:  4-  Foot plantar flexion:  4-  Foot inversion:  4-  Foot eversion:  4-     Left Foot Muscle Strength   Foot dorsiflexion:  4-  Foot plantar flexion:  4-  Foot inversion:  4-  Foot eversion:  4-     RANGE OF MOTION      Right Foot Range of Motion   Foot and ankle ROM within normal limits       Left Foot Range of Motion   Foot and ankle ROM within normal limits       DERMATOLOGIC     Right Foot Dermatologic   Skin: drainage, dry skin and ulcer    Skin: no right foot cellulitis and no right foot redness    Nails: onychomycosis, abnormally thick and dystrophic nails       Left Foot Dermatologic   Skin: dry skin    Nails: onychomycosis, abnormally thick and dystrophic nails       Image:       RADIOLOGY/NUCLEAR:  No results found.    LABORATORY/CULTURE RESULTS:      PATHOLOGY RESULTS:       ASSESSMENT/PLAN      Diagnoses and all orders for this visit:    1. Thickened nail (Primary)    2. Anticoagulant long-term use    3. Wheelchair dependent    4. Hallux valgus, right    5. Functional neurological symptom disorder with weakness or paralysis      Comprehensive lower extremity examination and evaluation was performed.  Discussed findings and treatment plan including risks, benefits, and treatment options with patient in detail. Patient agreed with treatment plan.  After verbal consent obtained, nail(s) x10 debrided of length and thickness with nail nipper without incidence  Patient may maintain nails and calluses at home utilizing emery board or pumice stone between visits as needed   Remain conservative with foot deformities.   Continue to follow-up in outpatient wound care center as directed.  An After Visit Summary was printed and given to the patient at discharge, including (if requested) any available informative/educational handouts regarding diagnosis, treatment, or medications. All questions were answered to patient/family satisfaction. Should symptoms fail to improve or worsen they agree to call or return to clinic or to go to the Emergency Department. Discussed the importance of following up with any needed screening tests/labs/specialist appointments and any requested follow-up recommended by me today. Importance of maintaining follow-up discussed and patient accepts that missed appointments can delay diagnosis and potentially lead to worsening of conditions.  Return in about 3 months (around 11/16/2022)., or sooner if acute issues arise.    Lab Frequency Next Occurrence   Follow Anesthesia Guidelines / Standing Orders Once 03/02/2021   Provide NPO Instructions to Patient Once 03/07/2021   Chlorhexidine Skin Prep Once 03/07/2021   Obtain Informed Consent Once 03/03/2021       This document has been electronically signed by Robert Dickerson DPM on August 16, 2022 10:27 CDT

## 2022-08-16 ENCOUNTER — OFFICE VISIT (OUTPATIENT)
Dept: PODIATRY | Facility: CLINIC | Age: 67
End: 2022-08-16

## 2022-08-16 VITALS — OXYGEN SATURATION: 98 % | HEIGHT: 66 IN | BODY MASS INDEX: 30.05 KG/M2 | WEIGHT: 187 LBS | HEART RATE: 74 BPM

## 2022-08-16 DIAGNOSIS — L60.2 THICKENED NAIL: Primary | ICD-10-CM

## 2022-08-16 DIAGNOSIS — M20.11 HALLUX VALGUS, RIGHT: ICD-10-CM

## 2022-08-16 DIAGNOSIS — F44.4 FUNCTIONAL NEUROLOGICAL SYMPTOM DISORDER WITH WEAKNESS OR PARALYSIS: ICD-10-CM

## 2022-08-16 DIAGNOSIS — Z99.3 WHEELCHAIR DEPENDENT: ICD-10-CM

## 2022-08-16 DIAGNOSIS — Z79.01 ANTICOAGULANT LONG-TERM USE: ICD-10-CM

## 2022-08-16 PROCEDURE — 11721 DEBRIDE NAIL 6 OR MORE: CPT | Performed by: PODIATRIST

## 2022-08-24 ENCOUNTER — OFFICE VISIT (OUTPATIENT)
Dept: WOUND CARE | Facility: HOSPITAL | Age: 67
End: 2022-08-24

## 2022-08-24 DIAGNOSIS — L89.893 PRESSURE ULCER OF OTHER SITE, STAGE 3: ICD-10-CM

## 2022-08-24 DIAGNOSIS — S71.001D UNSPECIFIED OPEN WOUND, RIGHT HIP, SUBSEQUENT ENCOUNTER: ICD-10-CM

## 2022-08-24 DIAGNOSIS — A49.9 BACTERIAL INFECTION, UNSPECIFIED: ICD-10-CM

## 2022-08-24 DIAGNOSIS — L89.313 PRESSURE ULCER OF RIGHT BUTTOCK, STAGE 3: ICD-10-CM

## 2022-08-24 PROCEDURE — 11042 DBRDMT SUBQ TIS 1ST 20SQCM/<: CPT | Performed by: SURGERY

## 2022-08-24 PROCEDURE — 97605 NEG PRS WND THER DME<=50SQCM: CPT

## 2022-08-30 ENCOUNTER — OFFICE VISIT (OUTPATIENT)
Dept: WOUND CARE | Facility: HOSPITAL | Age: 67
End: 2022-08-30

## 2022-08-30 DIAGNOSIS — S71.001D UNSPECIFIED OPEN WOUND, RIGHT HIP, SUBSEQUENT ENCOUNTER: ICD-10-CM

## 2022-08-30 DIAGNOSIS — L89.893 PRESSURE ULCER OF OTHER SITE, STAGE 3: ICD-10-CM

## 2022-08-30 DIAGNOSIS — L89.313 PRESSURE ULCER OF RIGHT BUTTOCK, STAGE 3: ICD-10-CM

## 2022-08-30 DIAGNOSIS — S50.901D: ICD-10-CM

## 2022-08-30 PROCEDURE — 97597 DBRDMT OPN WND 1ST 20 CM/<: CPT | Performed by: SURGERY

## 2022-08-30 PROCEDURE — 97605 NEG PRS WND THER DME<=50SQCM: CPT

## 2022-09-03 PROCEDURE — 93294 REM INTERROG EVL PM/LDLS PM: CPT | Performed by: INTERNAL MEDICINE

## 2022-09-03 PROCEDURE — 93296 REM INTERROG EVL PM/IDS: CPT | Performed by: INTERNAL MEDICINE

## 2022-09-06 ENCOUNTER — OFFICE VISIT (OUTPATIENT)
Dept: WOUND CARE | Facility: HOSPITAL | Age: 67
End: 2022-09-06

## 2022-09-06 DIAGNOSIS — L89.313 PRESSURE ULCER OF RIGHT BUTTOCK, STAGE 3: ICD-10-CM

## 2022-09-06 DIAGNOSIS — S71.001D UNSPECIFIED OPEN WOUND, RIGHT HIP, SUBSEQUENT ENCOUNTER: ICD-10-CM

## 2022-09-06 DIAGNOSIS — L89.893 PRESSURE ULCER OF OTHER SITE, STAGE 3: ICD-10-CM

## 2022-09-06 DIAGNOSIS — A49.9 BACTERIAL INFECTION, UNSPECIFIED: ICD-10-CM

## 2022-09-06 PROCEDURE — 97605 NEG PRS WND THER DME<=50SQCM: CPT

## 2022-09-06 PROCEDURE — 11042 DBRDMT SUBQ TIS 1ST 20SQCM/<: CPT | Performed by: SURGERY

## 2022-09-12 ENCOUNTER — OFFICE VISIT (OUTPATIENT)
Dept: WOUND CARE | Facility: HOSPITAL | Age: 67
End: 2022-09-12

## 2022-09-12 DIAGNOSIS — S71.001D UNSPECIFIED OPEN WOUND, RIGHT HIP, SUBSEQUENT ENCOUNTER: ICD-10-CM

## 2022-09-12 DIAGNOSIS — L89.893 PRESSURE ULCER OF OTHER SITE, STAGE 3: ICD-10-CM

## 2022-09-12 DIAGNOSIS — L89.313 PRESSURE ULCER OF RIGHT BUTTOCK, STAGE 3: ICD-10-CM

## 2022-09-12 DIAGNOSIS — A49.9 BACTERIAL INFECTION, UNSPECIFIED: ICD-10-CM

## 2022-09-12 PROCEDURE — 11042 DBRDMT SUBQ TIS 1ST 20SQCM/<: CPT | Performed by: NURSE PRACTITIONER

## 2022-09-19 ENCOUNTER — OFFICE VISIT (OUTPATIENT)
Dept: WOUND CARE | Facility: HOSPITAL | Age: 67
End: 2022-09-19

## 2022-09-19 DIAGNOSIS — L89.893 PRESSURE ULCER OF OTHER SITE, STAGE 3: ICD-10-CM

## 2022-09-19 DIAGNOSIS — A49.9 BACTERIAL INFECTION, UNSPECIFIED: ICD-10-CM

## 2022-09-19 DIAGNOSIS — S71.001D UNSPECIFIED OPEN WOUND, RIGHT HIP, SUBSEQUENT ENCOUNTER: ICD-10-CM

## 2022-09-19 DIAGNOSIS — L89.313 PRESSURE ULCER OF RIGHT BUTTOCK, STAGE 3: ICD-10-CM

## 2022-09-19 PROCEDURE — 97605 NEG PRS WND THER DME<=50SQCM: CPT

## 2022-09-19 PROCEDURE — 11042 DBRDMT SUBQ TIS 1ST 20SQCM/<: CPT | Performed by: NURSE PRACTITIONER

## 2022-09-19 NOTE — DISCHARGE SUMMARY
Patient presented to the infusion center for Cycle 3 day 1 of decitabine. Labs results with critical WBC of 0.5 and per lab, \"No differential performed WBC less than or equal to 0.5\" Dr. Pratt notified and MD ordered to hold TX and venetoclax for 1 week. RN explained this son, wife and patient. Educated on neutropenia and instructed to come back on Saturday to have labs drawn for Monday 09/26/2022 TX. Patient verbalized understanding.    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 I&D PSOAS ABCESS    Additional Pertinent Hx: RECENT HX OF CX SX, BACTEREMIA  RESTRICTIONS/PRECAUTIONS:    Restrictions/Precautions  Restrictions/Precautions: Fall Risk, Weight Bearing  Required Braces or Orthoses?: Yes  Position Activity Restriction  Spinal Precautions: No Bending, No Lifting, No Twisting  OVERALL  ORIENTATION STATUS:     PAIN:  Pain Level: 7  Pain Type: Chronic pain    Pain Location: Hip, Back     Pain Orientation: Right, Lower      NEUROLOGICAL                       Sensation  Overall Sensation Status: Impaired  STRENGTH  Strength RLE  Comment: 2-/5 HIP, 3/5 KNEE, 3+/5 ANKLE     ROM  AROM RLE (degrees)  RLE General AROM: DECREASED AT HIP  AROM LLE (degrees)  LLE AROM : WFL  POSTURE/BALANCE  Balance  Sitting - Static: Good  Sitting - Dynamic: Good  Standing - Dynamic: Fair  Comments: DON/DOFF SHIRT IN SITTING EOB        ACTIVITY TOLERANCE  Activity Tolerance  Activity Tolerance: Patient Tolerated treatment well, Patient limited by pain      BED MOBILITY  Bed Mobility  Rolling: Stand by assistance, Supervision(TO THE RIGHT )  Supine to Sit: Supervision, Stand by assistance  Sit to Supine: Stand by assistance, Contact guard assistance  Scooting: Contact guard assistance  Comment: Reverse log roll on right side of bed. TRANSFERS  Sit to Stand: Contact guard assistance, Stand by assistance(RW)      Bed to Chair: Contact guard assistance(with RW from W/C to bed)        WHEELCHAIR PROPULSION 1  Propulsion 1  Propulsion: Manual  Level: Level Tile  Method: RUBINA FITZPATRICK  Level of Assistance: Stand by assistance  Description/ Details: R AND L TURNS. ABLE TO MANUEVER AROUND OBSTACLES AND REVERSE DIRECTION.    Distance: 75 FT WHEELCHAIR PROPULSION 2     AMBULATION 1  Ambulation 1  Surface: level tile  Device: Rolling Walker  Other Apparatus: Wheelchair follow  Assistance: Contact guard assistance  Quality of Gait: Left hip drop, Left foot no heel strike, right foot limited stance time  Gait Deviations: Slow Vania, Decreased step length  Distance: 100'  Comments: One rest break. No turns. AMBULATION 2  Ambulation 2  Surface - 2: level tile  Device 2: Rolling Walker  Other Apparatus 2: Wheelchair follow  Assistance 2: Contact guard assistance  Quality of Gait 2: Step to pattern  Gait Deviations: Slow Vania, Decreased step length  Distance: 60'  Comments: In room with incoprorated turns. STAIRS       GOALS:  Short term goals  Time Frame for Short term goals: 1 WEEK  Short term goal 1: CGA FOR ON BED MOBILITY  Short term goal 2: CGA FOR TRANSFERS  Short term goal 3: CGA AMB 50 FEET WITH AD  Short term goal 4: INDEP W/C PROPEL 50 FEET  Short term goal 5: CGA CAR TRANSFER    Long term goals  Time Frame for Long term goals : 2 WEEKS  Long term goal 1: INDEP ON BED AND TRANSFERS  Long term goal 2: INDPE  FEET WITH AD  Long term goal 3: INDPE W/C PROPEL 150 FEET  Long term goal 4: INDEP CAR TRANSFER  Long term goal 5: INDEP 4 STEP   HOME LIVING:     Type of Home: House  Home Layout: Two level, Able to Live on Main level with bedroom/bathroom  Home Access: Stairs to enter with rails     Entrance Stairs - Rails: Right  ASSESSMENT (IMPAIRMENTS/BARRIERS): Body structures, Functions, Activity limitations: Decreased functional mobility , Decreased ROM, Decreased strength, Decreased endurance, Decreased balance, Decreased fine motor control, Decreased coordination, Increased pain  Assessment: TRIALED WC PROPULSION USING BUE. PT HAD NO DIFFICULTY PERFORMING AND WAS ABLE TO MANUEVER AROUND OBSTACLES AND COULD REVERSE/CHANGE DIRECTION WITH EASE. PT DID NOT FEEL UP FOR WALKING THIS AFTERNOON DUE TO INCREASED BACK PAIN.    Activity Tolerance: Patient Tolerated treatment well, Patient limited by pain     PLAN:  Plan  Times per week: 5 TO 6  Times per day: Twice a day  Plan weeks: 2 WEEKS  Current Treatment Recommendations: Strengthening, ROM, Balance Training, Functional Mobility Training, Transfer Training, Endurance Training, Wheelchair Mobility Training, Gait Training, Stair training, Safety Education & Training, Patient/Caregiver Education & Training, Home Exercise Program, Equipment Evaluation, Education, & procurement  Discharge Recommendations: Home with Home health PT  PATIENT REQUIRES AND IS REASONABLY EXPECTED TO ACTIVELY PARTICIPATE IN AT LEAST 3 HOURS OF INTENSIVE THERAPY PER DAY AT LEAST 5 DAYS PER WEEK, AND BE EXPECTED TO MAKE MEASURABLE IMPROVEMENT THAT WILL BE OF PRACTICAL VALUE TO IMPROVE THE PATIENT'S FUNCTIONAL CAPACITY OR ADAPTATION TO IMPAIRMENTS.    PATIENT GOAL FOR REHAB:  RETURN TO PRIOR LEVEL OF FUNCTION       IRF/CAT  Roll Left and Right  Assistance Needed: Supervision or touching assistance  CARE Score: 4  Discharge Goal: Independent    Sit to Lying  Assistance Needed: Supervision or touching assistance  CARE Score: 4  Discharge Goal: Independent    Lying to Sitting on Side of Bed  Assistance Needed: Supervision or touching assistance  CARE Score: 4  Discharge Goal: Independent    Sit to Stand  Assistance Needed: Supervision or touching assistance  CARE Score: 4  Discharge Goal: Independent    Chair/Bed-to-Chair Transfer  Assistance Needed: Supervision or touching assistance  CARE Score: 4  Discharge Goal: Independent    Car Transfer  Reason if not Attempted: Not attempted due to medical condition or safety concerns  CARE Score: 88  Discharge Goal: Independent    Walk 10 Feet  Assistance Needed: Supervision or touching assistance  CARE Score: 4  Discharge Goal: Independent    Walk 50 Feet with Two Turns  Assistance Needed: Supervision or touching assistance  Reason if not Attempted: Not attempted due to medical condition or safety concerns  CARE Score: 4  Discharge Goal: Independent    Walk 150 Feet  Reason if not Attempted: Not attempted due to medical condition or safety concerns  CARE Score: 88  Discharge Goal: Independent    Walking 10 Feet on Uneven Surfaces  Reason if not Attempted: Not attempted due to medical condition or safety concerns  CARE Score: 88  Discharge Goal: Not Attempted    1 Step (Curb)  Reason if not Attempted: Not attempted due to medical condition or safety concerns  CARE Score: 88  Discharge Goal: Independent    4 Steps  Reason if not Attempted: Not attempted due to medical condition or safety concerns  CARE Score: 88  Discharge Goal: Independent    12 Steps  Reason if not Attempted: Not attempted due to medical condition or safety concerns  CARE Score: 88  Discharge Goal: Supervision or touching assistance    Wheel 50 Feet with Two Turns  Assistance Needed: Supervision or touching assistance  CARE Score: 4  Discharge Goal: Independent    Wheel 150 Feet  Reason if not Attempted: Not attempted due to medical condition or safety concerns  CARE Score: 88  Discharge Goal: Independent      LAST TREATMENT TIME  PT Individual Minutes  Time In: 1430  Time Out: [address]  Minutes: 45

## 2022-09-26 ENCOUNTER — OFFICE VISIT (OUTPATIENT)
Dept: WOUND CARE | Facility: HOSPITAL | Age: 67
End: 2022-09-26

## 2022-09-26 DIAGNOSIS — L89.893 PRESSURE ULCER OF OTHER SITE, STAGE 3: ICD-10-CM

## 2022-09-26 DIAGNOSIS — A49.9 BACTERIAL INFECTION, UNSPECIFIED: ICD-10-CM

## 2022-09-26 DIAGNOSIS — S71.001D UNSPECIFIED OPEN WOUND, RIGHT HIP, SUBSEQUENT ENCOUNTER: ICD-10-CM

## 2022-09-26 DIAGNOSIS — L89.313 PRESSURE ULCER OF RIGHT BUTTOCK, STAGE 3: ICD-10-CM

## 2022-09-26 PROCEDURE — 97605 NEG PRS WND THER DME<=50SQCM: CPT

## 2022-09-26 PROCEDURE — 11042 DBRDMT SUBQ TIS 1ST 20SQCM/<: CPT | Performed by: NURSE PRACTITIONER

## 2022-10-03 ENCOUNTER — APPOINTMENT (OUTPATIENT)
Dept: WOUND CARE | Facility: HOSPITAL | Age: 67
End: 2022-10-03

## 2022-10-07 ENCOUNTER — APPOINTMENT (OUTPATIENT)
Dept: WOUND CARE | Facility: HOSPITAL | Age: 67
End: 2022-10-07

## 2022-10-11 ENCOUNTER — OFFICE VISIT (OUTPATIENT)
Dept: WOUND CARE | Facility: HOSPITAL | Age: 67
End: 2022-10-11

## 2022-10-11 DIAGNOSIS — L89.313 PRESSURE ULCER OF RIGHT BUTTOCK, STAGE 3: ICD-10-CM

## 2022-10-11 DIAGNOSIS — L89.893 PRESSURE ULCER OF OTHER SITE, STAGE 3: ICD-10-CM

## 2022-10-11 DIAGNOSIS — S71.001D UNSPECIFIED OPEN WOUND, RIGHT HIP, SUBSEQUENT ENCOUNTER: ICD-10-CM

## 2022-10-11 PROCEDURE — 99212 OFFICE O/P EST SF 10 MIN: CPT | Performed by: SURGERY

## 2022-10-11 PROCEDURE — 11042 DBRDMT SUBQ TIS 1ST 20SQCM/<: CPT | Performed by: SURGERY

## 2022-10-18 ENCOUNTER — OFFICE VISIT (OUTPATIENT)
Dept: WOUND CARE | Facility: HOSPITAL | Age: 67
End: 2022-10-18

## 2022-10-18 DIAGNOSIS — L89.313 PRESSURE ULCER OF RIGHT BUTTOCK, STAGE 3: ICD-10-CM

## 2022-10-18 DIAGNOSIS — L89.893 PRESSURE ULCER OF OTHER SITE, STAGE 3: ICD-10-CM

## 2022-10-18 DIAGNOSIS — S71.001D UNSPECIFIED OPEN WOUND, RIGHT HIP, SUBSEQUENT ENCOUNTER: ICD-10-CM

## 2022-10-18 PROCEDURE — 11042 DBRDMT SUBQ TIS 1ST 20SQCM/<: CPT | Performed by: SURGERY

## 2022-10-24 ENCOUNTER — LAB REQUISITION (OUTPATIENT)
Dept: LAB | Facility: HOSPITAL | Age: 67
End: 2022-10-24

## 2022-10-24 DIAGNOSIS — Z00.00 ENCOUNTER FOR GENERAL ADULT MEDICAL EXAMINATION WITHOUT ABNORMAL FINDINGS: ICD-10-CM

## 2022-10-24 LAB
AMORPH URATE CRY URNS QL MICRO: ABNORMAL /HPF
BACTERIA UR QL AUTO: ABNORMAL /HPF
BILIRUB UR QL STRIP: NEGATIVE
CLARITY UR: ABNORMAL
COLOR UR: ABNORMAL
GLUCOSE UR STRIP-MCNC: NEGATIVE MG/DL
HGB UR QL STRIP.AUTO: ABNORMAL
HYALINE CASTS UR QL AUTO: ABNORMAL /LPF
KETONES UR QL STRIP: NEGATIVE
LEUKOCYTE ESTERASE UR QL STRIP.AUTO: ABNORMAL
NITRITE UR QL STRIP: POSITIVE
PH UR STRIP.AUTO: 6.5 [PH] (ref 5–8)
PROT UR QL STRIP: ABNORMAL
RBC # UR STRIP: ABNORMAL /HPF
REF LAB TEST METHOD: ABNORMAL
SP GR UR STRIP: 1.02 (ref 1–1.03)
SQUAMOUS #/AREA URNS HPF: ABNORMAL /HPF
UROBILINOGEN UR QL STRIP: ABNORMAL
WBC # UR STRIP: ABNORMAL /HPF

## 2022-10-24 PROCEDURE — 87086 URINE CULTURE/COLONY COUNT: CPT

## 2022-10-24 PROCEDURE — 87077 CULTURE AEROBIC IDENTIFY: CPT

## 2022-10-24 PROCEDURE — 87186 SC STD MICRODIL/AGAR DIL: CPT

## 2022-10-24 PROCEDURE — 81001 URINALYSIS AUTO W/SCOPE: CPT

## 2022-10-25 ENCOUNTER — OFFICE VISIT (OUTPATIENT)
Dept: WOUND CARE | Facility: HOSPITAL | Age: 67
End: 2022-10-25

## 2022-10-25 DIAGNOSIS — S71.001D UNSPECIFIED OPEN WOUND, RIGHT HIP, SUBSEQUENT ENCOUNTER: ICD-10-CM

## 2022-10-25 DIAGNOSIS — L89.893 PRESSURE ULCER OF OTHER SITE, STAGE 3: ICD-10-CM

## 2022-10-25 DIAGNOSIS — A49.9 BACTERIAL INFECTION, UNSPECIFIED: ICD-10-CM

## 2022-10-25 DIAGNOSIS — L89.313 PRESSURE ULCER OF RIGHT BUTTOCK, STAGE 3: ICD-10-CM

## 2022-10-25 PROCEDURE — 97597 DBRDMT OPN WND 1ST 20 CM/<: CPT | Performed by: SURGERY

## 2022-10-25 PROCEDURE — 11042 DBRDMT SUBQ TIS 1ST 20SQCM/<: CPT | Performed by: SURGERY

## 2022-10-27 LAB
BACTERIA SPEC AEROBE CULT: ABNORMAL
BACTERIA SPEC AEROBE CULT: ABNORMAL

## 2022-11-01 ENCOUNTER — OFFICE VISIT (OUTPATIENT)
Dept: WOUND CARE | Facility: HOSPITAL | Age: 67
End: 2022-11-01

## 2022-11-01 DIAGNOSIS — S71.001D UNSPECIFIED OPEN WOUND, RIGHT HIP, SUBSEQUENT ENCOUNTER: ICD-10-CM

## 2022-11-01 DIAGNOSIS — L89.893 PRESSURE ULCER OF OTHER SITE, STAGE 3: ICD-10-CM

## 2022-11-01 DIAGNOSIS — L89.313 PRESSURE ULCER OF RIGHT BUTTOCK, STAGE 3: ICD-10-CM

## 2022-11-01 PROCEDURE — 11042 DBRDMT SUBQ TIS 1ST 20SQCM/<: CPT | Performed by: SURGERY

## 2022-11-08 ENCOUNTER — OFFICE VISIT (OUTPATIENT)
Dept: WOUND CARE | Facility: HOSPITAL | Age: 67
End: 2022-11-08

## 2022-11-08 DIAGNOSIS — L89.313 PRESSURE ULCER OF RIGHT BUTTOCK, STAGE 3: ICD-10-CM

## 2022-11-08 DIAGNOSIS — A49.9 BACTERIAL INFECTION, UNSPECIFIED: ICD-10-CM

## 2022-11-08 DIAGNOSIS — S71.001D UNSPECIFIED OPEN WOUND, RIGHT HIP, SUBSEQUENT ENCOUNTER: ICD-10-CM

## 2022-11-08 DIAGNOSIS — L89.893 PRESSURE ULCER OF OTHER SITE, STAGE 3: ICD-10-CM

## 2022-11-08 PROCEDURE — 11042 DBRDMT SUBQ TIS 1ST 20SQCM/<: CPT | Performed by: SURGERY

## 2022-11-10 NOTE — PROGRESS NOTES
[facility] - PODIATRY    Today's Date: 11/18/2022     Patient Name: Tawny Shin  MRN: [id number]  CSN: [id number]  PCP: Leta Julian DO  Referring Provider: No ref. provider found    SUBJECTIVE     Chief Complaint   Patient presents with   • Follow-up     Leta Julian DO -09/07/2022-3 month nail care- pt states feet doing better. Thinks she is starting to have neuropathy, feels like she has shoes and socks on at  all times- pt denies pain- pt presents in wheel chair, long thick nails, dillon bunions larger on right     HPI: Tawny Shin, a 67 y.o.female, comes to clinic as a(n) established patient complaining of thickened, irregular toenails of both feet. Patient has h/o arthritis, asthma, bunions, CA, Heart block, Charcot joint of left foot, DVT, COPD, CAD, HLD, HTN, Hypothyroid, MI, pacemaker. Patient presents for nail care of thickened, irregular toenails of both feet. Denies any open wounds or sores currently. States that she feels like she has some neuropathy in feet as she is unable to fully feel toes. Continues use of wheelchair. Denies pain at the present time. Relates previous treatment(s) including care by podiatry. Patient continues Eliquis daily. Denies any constitutional symptoms. No other pedal complaints at this time.    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   • Elevated cholesterol    • Eustachian tube  "dysfunction    • Heart disease    • Herpes simplex    • History of transfusion    • Hyperlipidemia    • Hypertension    • Hypothyroidism 12/23/2020   • Intrinsic asthma 12/23/2020   • Knee dislocation    • Labral tear of right hip joint    • Laryngitis sicca    • Laryngitis, chronic    • Left carotid bruit 3/9/2016   • MI (myocardial infarction) ([facility])    • Myalgia due to statin 6/25/2019   • Open wound of right hip 9/14/2021   • Osteomyelitis of right femur ([facility]) 7/6/2021   • Otorrhea    • Pacemaker 11/17/2016   • Primary osteoarthritis of left knee 12/23/2020   • Psoriasis vulgaris 12/23/2020   • S/P coronary artery stent placement 3/9/2016   • Sensorineural hearing loss    • Seropositive rheumatoid arthritis of multiple sites ([facility]) 12/27/2019    Overview:  -myochrysine '93-'96 -methotrexate '96--->11/98;r/s  restarted 2/99--> 8/14 (anemia) -sulfasalazine- not effective -penicillamine 6/98-->10/98; no effect -leflunomide 11/98--> - Humira '13-->didn't take - Enbrel 12/14-->3/15- no effect!   Last Assessment & Plan:  - \"aching all over\" because she had to be off her anti-rheumatic drugs for 2 weeks in preparation for her R knee surgery - he   • Sick sinus syndrome ([facility]) 12/27/2019   • Sjogren's disease ([facility])    • Spondylolisthesis of lumbar region 1/17/2018   • Syncope, recurrent 2/8/2021     Past Surgical History:   Procedure Laterality Date   • A-V CARDIAC PACEMAKER INSERTION  2016   • ATRIAL CARDIAC PACEMAKER INSERTION     • CARDIAC CATHETERIZATION     • CATARACT EXTRACTION     • CERVICAL CORPECTOMY N/A 3/3/2021    Procedure: CERVICAL 6 CORPECTOMY WITH TITANIUM CAGE WITH NEURO MONITORING;  Surgeon: Bandar Shea MD;  Location: [facility];  Service: Neurosurgery;  Laterality: N/A;   • COLONOSCOPY  11/08/2011    One fold in the ascending colon which showed ulcer otherwise normal exam   • COLONOSCOPY  11/12/2004    Normal exam repeat in five years   • CORONARY ANGIOPLASTY WITH STENT PLACEMENT      X 2; 2013 " & 2014   • ENDOSCOPY  07/10/2014    Normal exam   • FLAP LEG Right 9/14/2021    Procedure: RIGHT GLUTEAL FASCIOCUTANEOUS ADVANCEMENT FLAP AND RIGHT TENSOR FASCIAL JESSICA FLAP;  Surgeon: Amadeo Turner MD;  Location:  [facility];  Service: Plastics;  Laterality: Right;   • HIP ABDUCTION TENOTOMY BILATERAL Right 1/14/2021    Procedure: RIGHT HIP GLUTEUS MEDLUS / MINIMUS REPAIR, POSSIBLE ACHILLES ALLOGRAFT;  Surgeon: Nino Carlson MD;  Location:  [facility];  Service: Orthopedics;  Laterality: Right;   • INCISION AND DRAINAGE ABSCESS Right 6/4/2022    Procedure: INCISION AND DRAINAGE ABSCESS right hip;  Surgeon: Magda Salcido MD;  Location:  [facility];  Service: General;  Laterality: Right;   • INCISION AND DRAINAGE ABSCESS Right 6/10/2022    Procedure: RIGHT HIP INCISION AND DRAINAGE. MD NEEDS 3L VANC IRRIGATION, CURRETTES, DAICANS, KERLEX ROLLS;  Surgeon: Amadeo Turner MD;  Location:  [facility];  Service: Plastics;  Laterality: Right;   • INCISION AND DRAINAGE HIP Right 2/9/2021    Procedure: HIP INCISION AND DRAINAGE;  Surgeon: Nino Carlson MD;  Location:  [facility];  Service: Orthopedics;  Laterality: Right;   • INCISION AND DRAINAGE LEG Right 10/24/2021    Procedure: INCISION AND DRAINAGE LOWER EXTREMITY;  Surgeon: Amadeo Turner MD;  Location:  [facility];  Service: Plastics;  Laterality: Right;   • INCISION AND DRAINAGE OF WOUND Right 7/8/2021    Procedure: INCISION AND DRAINAGE WOUND RIGHT HIP;  Surgeon: James Huntley MD;  Location:  [facility];  Service: Orthopedics;  Laterality: Right;   • JOINT REPLACEMENT     • KYPHOPLASTY WITH BIOPSY Bilateral 10/26/2021    Procedure: THOARCIC 12 KYPHOPLASTY WITH BIOPSY;  Surgeon: Bandar Shea MD;  Location:  [facility];  Service: Neurosurgery;  Laterality: Bilateral;   • LEG DEBRIDEMENT Right 9/14/2021    Procedure: DEBRIDEMENT OF RIGHT HIP WOUND, RIGHT GLUTEAL FASCIOCUTANEOUS ADVANCEMENT FLAP AND RIGHT TENSOR FASCIAL JESSICA FLAP;  Surgeon: Yue  Amadeo LAGUNAS MD;  Location:  [facility];  Service: Plastics;  Laterality: Right;   • LUMBAR DISCECTOMY Right 3/23/2021    Procedure: LUMBAR DISCECTOMY MICRO, Lumbar 1/2 right;  Surgeon: Bandar Shea MD;  Location:  [facility];  Service: Neurosurgery;  Laterality: Right;   • LUMBAR FUSION N/A 1/19/2018    Procedure: L3-4,L4-5 DECOMPRESSION, POSTERIOR SPINAL FUSION WITH INSTRUMENTATION;  Surgeon: Fortino Oropeza MD;  Location:  [facility];  Service:    • LUMBAR LAMINECTOMY WITH FUSION Left 1/17/2018    Procedure: LEFT L3-4 L4-5 LATERAL LUMBAR INTERBODY FUSION;  Surgeon: Fortino Oropeza MD;  Location:  [facility];  Service:    • MYRINGOTOMY W/ TUBES  09/04/2014    TUBES NO LONGER IN PLACE   • OTHER SURGICAL HISTORY      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 so hardware was removed and spacers were placed   • REPLACEMENT TOTAL KNEE Right      Family History   Problem Relation Age of Onset   • Cancer Mother    • Heart disease Father      Social History     Socioeconomic History   • Marital status:    Tobacco Use   • Smoking status: Never   • Smokeless tobacco: Never   Vaping Use   • Vaping Use: Never used   Substance and Sexual Activity   • Alcohol use: No   • Drug use: Never   • Sexual activity: Defer     Allergies   Allergen Reactions   • Atorvastatin Other (See Comments)     LEG CRAMPS     • Amoxicillin Rash   • Escitalopram Rash   • Nabumetone Rash   • Niacin Er Rash   • Penicillin G Rash   • Penicillins Rash   • Simvastatin Rash     Current Outpatient Medications   Medication Sig Dispense Refill   • acetaminophen (TYLENOL) 325 MG tablet Take 650 mg by mouth Every 6 (Six) Hours As Needed (mild pain).     • apixaban (ELIQUIS) 5 MG tablet tablet Take 1 tablet by mouth 2 (Two) Times a Day. 60 tablet    • ascorbic acid (VITAMIN C) 500 MG tablet Take 500 mg by mouth Daily.     • aspirin 81 MG EC tablet Take 81 mg by mouth Daily.     • carvedilol (COREG) 25 MG tablet Take 1 tablet by mouth 2 (Two) Times a Day With  Meals. 180 tablet 3   • collagenase 250 UNIT/GM ointment Apply 1 application topically to the appropriate area as directed Daily.     • Diclofenac Sodium (VOLTAREN) 1 % gel gel Apply 4 g topically to the appropriate area as directed 4 (Four) Times a Day As Needed.     • docusate sodium 100 MG capsule Take 1 capsule by mouth 2 (Two) Times a Day.     • DULoxetine (CYMBALTA) 60 MG capsule Take 60 mg by mouth Daily.     • famotidine (PEPCID) 40 MG tablet Take 40 mg by mouth Daily.     • ferrous sulfate 324 (65 Fe) MG tablet delayed-release EC tablet Take 324 mg by mouth Daily With Breakfast.     • fluticasone (FLONASE) 50 MCG/ACT nasal spray 1 spray into the nostril(s) as directed by provider Daily. 18.2 mL 5   • folic acid (FOLVITE) 1 MG tablet Take 1 mg by mouth Daily.     • furosemide (LASIX) 40 MG tablet Take 40 mg by mouth Daily.     • HYDROcodone-acetaminophen (NORCO) 7.5-325 MG per tablet Take 1 tablet by mouth Every 8 (Eight) Hours As Needed for Moderate Pain . 9 tablet 0   • Hydrocortisone (sam's amazing butt) cream Apply 1 application topically to the appropriate area as directed As Needed. Recipe Contains: 1:1:1:1 of hydrocortisone 1% oint, bacitracin 500 unit/gram oint, zinc 20% oint, nystatin 100,000 unit/gram oint     • isosorbide mononitrate (IMDUR) 60 MG 24 hr tablet Take 60 mg by mouth 2 (Two) Times a Day.     • levothyroxine (SYNTHROID, LEVOTHROID) 75 MCG tablet Take 1 tablet by mouth Daily. 90 tablet 3   • Lidocaine 4 % patch Apply  topically every night at bedtime. To lower back     • magnesium hydroxide (MILK OF MAGNESIA) 400 MG/5ML suspension Take 30 mL by mouth Daily As Needed for Constipation.     • magnesium oxide (MAG-OX) 400 MG tablet Take 400 mg by mouth Daily.     • Menthol-Zinc Oxide 0.45-20 % ointment Apply 1 application topically. Apply to buttock     • multivitamin with minerals tablet tablet Take 1 tablet by mouth Daily.     • Naloxegol Oxalate (Movantik) 25 MG tablet Take 25 mg by  mouth Every Morning.     • nitroglycerin (Nitrostat) 0.4 MG SL tablet Place 1 tablet under the tongue Every 5 (Five) Minutes As Needed for Chest Pain. Take no more than 3 doses in 15 minutes.  12   • ondansetron (ZOFRAN) 4 MG tablet Take 4 mg by mouth Every 6 (Six) Hours As Needed for Nausea or Vomiting.     • polyethylene glycol (MIRALAX) 17 GM/SCOOP powder Take 17 g by mouth Daily.     • saccharomyces boulardii (Florastor) 250 MG capsule Take 1 capsule by mouth Daily. 90 capsule 3   • sennosides-docusate (PERICOLACE) 8.6-50 MG per tablet Take 2 tablets by mouth 2 (Two) Times a Day.     • sucralfate (CARAFATE) 1 g tablet Take 1 g by mouth 4 (Four) Times a Day Before Meals & at Bedtime.     • Thiamine HCl (VITAMIN B-1 PO) Take 100 mg by mouth Daily.     • traMADol (ULTRAM) 50 MG tablet Take 100 mg by mouth 3 (Three) Times a Day As Needed for Moderate Pain .     • valACYclovir (VALTREX) 500 MG tablet Take 500 mg by mouth Daily.       No current facility-administered medications for this visit.     Review of Systems   Constitutional: Negative for chills and fever.   HENT: Negative for congestion.    Respiratory: Negative for shortness of breath.    Cardiovascular: Positive for leg swelling. Negative for chest pain.   Gastrointestinal: Negative for constipation, diarrhea, nausea and vomiting.   Musculoskeletal: Positive for arthralgias, back pain and gait problem.        Foot pain   Skin: Positive for wound.   Neurological: Positive for numbness.       OBJECTIVE     Vitals:    22 1325   Pulse: 97   SpO2: 96%       PHYSICAL EXAM  GEN:   Accompanied by family.     Foot/Ankle Exam:       General:   Appearance: appears stated age and healthy    Orientation: AAOx3    Affect: appropriate    Assistance: wheelchair    Shoe Gear:  Casual shoes    VASCULAR      Right Foot Vascularity   Dorsalis pedis:  2+  Posterior tibial:  2+  Skin Temperature: warm    Edema Gradin+ and pitting  CFT:  3  Pedal Hair Growth:   Present  Varicosities: spider veins       Left Foot Vascularity   Dorsalis pedis:  2+  Posterior tibial:  2+  Skin Temperature: warm    Edema Gradin+ and pitting  CFT:  3  Pedal Hair Growth:  Present  Varicosities: spider veins        NEUROLOGIC     Right Foot Neurologic   Light touch sensation:  Diminished  Vibratory sensation:  Diminished  Hot/Cold sensation: diminished    Protective Sensation using [place]an Monofilament:  10     Left Foot Neurologic   Light touch sensation:  Diminished  Vibratory sensation:  Diminished  Hot/cold sensation: diminished    Protective Sensation using [place]an Monofilament:  10     MUSCULOSKELETAL      Right Foot Musculoskeletal   Ecchymosis:  None  Tenderness: none    Arch:  Normal  Hammertoe:  Second toe, third toe, fourth toe and fifth toe  Hallux valgus: Yes       Left Foot Musculoskeletal   Ecchymosis:  None  Tenderness: none    Arch:  Normal  Hammertoe:  Second toe, third toe, fourth toe and fifth toe     MUSCLE STRENGTH     Right Foot Muscle Strength   Foot dorsiflexion:  4-  Foot plantar flexion:  4-  Foot inversion:  4-  Foot eversion:  4-     Left Foot Muscle Strength   Foot dorsiflexion:  4-  Foot plantar flexion:  4-  Foot inversion:  4-  Foot eversion:  4-     RANGE OF MOTION      Right Foot Range of Motion   Foot and ankle ROM within normal limits       Left Foot Range of Motion   Foot and ankle ROM within normal limits       DERMATOLOGIC     Right Foot Dermatologic   Skin: dry skin    Skin: no right foot cellulitis, no right foot redness and no right foot ulcer    Nails: onychomycosis, abnormally thick and dystrophic nails       Left Foot Dermatologic   Skin: dry skin    Skin: no left foot ulcer    Nails: onychomycosis, abnormally thick and dystrophic nails        RADIOLOGY/NUCLEAR:  No results found.    LABORATORY/CULTURE RESULTS:      PATHOLOGY RESULTS:       ASSESSMENT/PLAN     Diagnoses and all orders for this visit:    1. Thickened nail  (Primary)    2. Anticoagulant long-term use    3. Wheelchair dependent    4. Functional neurological symptom disorder with weakness or paralysis    5. Hallux valgus, right    6. Functional gait abnormality      Comprehensive lower extremity examination and evaluation was performed.  Discussed findings and treatment plan including risks, benefits, and treatment options with patient in detail. Patient agreed with treatment plan.  After verbal consent obtained, nail(s) x10 debrided of length and thickness with nail nipper without incidence  Patient may maintain nails and calluses at home utilizing emery board or pumice stone between visits as needed   Remain conservative with foot deformities.   An After Visit Summary was printed and given to the patient at discharge, including (if requested) any available informative/educational handouts regarding diagnosis, treatment, or medications. All questions were answered to patient/family satisfaction. Should symptoms fail to improve or worsen they agree to call or return to clinic or to go to the Emergency Department. Discussed the importance of following up with any needed screening tests/labs/specialist appointments and any requested follow-up recommended by me today. Importance of maintaining follow-up discussed and patient accepts that missed appointments can delay diagnosis and potentially lead to worsening of conditions.  Return in about 3 months (around 2/18/2023) for Follow-up with Podiatry Provider., or sooner if acute issues arise.    Lab Frequency Next Occurrence   Follow Anesthesia Guidelines / Standing Orders Once 03/02/2021   Provide NPO Instructions to Patient Once 03/07/2021   Chlorhexidine Skin Prep Once 03/07/2021   Obtain Informed Consent Once 03/03/2021       This document has been electronically signed by Robert Dickerson DPM on November 18, 2022 13:55 CST

## 2022-11-15 ENCOUNTER — OFFICE VISIT (OUTPATIENT)
Dept: WOUND CARE | Facility: HOSPITAL | Age: 67
End: 2022-11-15

## 2022-11-15 DIAGNOSIS — S71.001D UNSPECIFIED OPEN WOUND, RIGHT HIP, SUBSEQUENT ENCOUNTER: ICD-10-CM

## 2022-11-15 DIAGNOSIS — L89.313 PRESSURE ULCER OF RIGHT BUTTOCK, STAGE 3: ICD-10-CM

## 2022-11-15 DIAGNOSIS — L89.893 PRESSURE ULCER OF OTHER SITE, STAGE 3: ICD-10-CM

## 2022-11-15 DIAGNOSIS — A49.9 BACTERIAL INFECTION, UNSPECIFIED: ICD-10-CM

## 2022-11-15 PROCEDURE — G0463 HOSPITAL OUTPT CLINIC VISIT: HCPCS

## 2022-11-15 PROCEDURE — 99213 OFFICE O/P EST LOW 20 MIN: CPT | Performed by: SURGERY

## 2022-11-17 ENCOUNTER — TELEPHONE (OUTPATIENT)
Dept: PODIATRY | Facility: CLINIC | Age: 67
End: 2022-11-17

## 2022-11-18 ENCOUNTER — OFFICE VISIT (OUTPATIENT)
Dept: PODIATRY | Facility: CLINIC | Age: 67
End: 2022-11-18

## 2022-11-18 VITALS — HEART RATE: 97 BPM | WEIGHT: 187 LBS | HEIGHT: 66 IN | OXYGEN SATURATION: 96 % | BODY MASS INDEX: 30.05 KG/M2

## 2022-11-18 DIAGNOSIS — Z79.01 ANTICOAGULANT LONG-TERM USE: ICD-10-CM

## 2022-11-18 DIAGNOSIS — F44.4 FUNCTIONAL NEUROLOGICAL SYMPTOM DISORDER WITH WEAKNESS OR PARALYSIS: ICD-10-CM

## 2022-11-18 DIAGNOSIS — M20.11 HALLUX VALGUS, RIGHT: ICD-10-CM

## 2022-11-18 DIAGNOSIS — Z99.3 WHEELCHAIR DEPENDENT: ICD-10-CM

## 2022-11-18 DIAGNOSIS — R26.89 FUNCTIONAL GAIT ABNORMALITY: ICD-10-CM

## 2022-11-18 DIAGNOSIS — L60.2 THICKENED NAIL: Primary | ICD-10-CM

## 2022-11-18 PROCEDURE — 11721 DEBRIDE NAIL 6 OR MORE: CPT | Performed by: PODIATRIST

## 2022-12-07 ENCOUNTER — CLINICAL SUPPORT NO REQUIREMENTS (OUTPATIENT)
Dept: CARDIOLOGY | Facility: CLINIC | Age: 67
End: 2022-12-07

## 2022-12-07 ENCOUNTER — OFFICE VISIT (OUTPATIENT)
Dept: CARDIOLOGY | Facility: CLINIC | Age: 67
End: 2022-12-07

## 2022-12-07 ENCOUNTER — OFFICE VISIT (OUTPATIENT)
Dept: INTERNAL MEDICINE | Facility: CLINIC | Age: 67
End: 2022-12-07

## 2022-12-07 VITALS
BODY MASS INDEX: 28.28 KG/M2 | DIASTOLIC BLOOD PRESSURE: 56 MMHG | SYSTOLIC BLOOD PRESSURE: 118 MMHG | HEART RATE: 73 BPM | HEIGHT: 66 IN | WEIGHT: 176 LBS | OXYGEN SATURATION: 95 %

## 2022-12-07 VITALS
BODY MASS INDEX: 28.28 KG/M2 | SYSTOLIC BLOOD PRESSURE: 110 MMHG | OXYGEN SATURATION: 98 % | HEIGHT: 66 IN | TEMPERATURE: 97.8 F | HEART RATE: 65 BPM | DIASTOLIC BLOOD PRESSURE: 58 MMHG | WEIGHT: 176 LBS

## 2022-12-07 DIAGNOSIS — I10 ESSENTIAL HYPERTENSION: ICD-10-CM

## 2022-12-07 DIAGNOSIS — D63.8 ANEMIA OF CHRONIC DISEASE: ICD-10-CM

## 2022-12-07 DIAGNOSIS — F44.4 FUNCTIONAL NEUROLOGICAL SYMPTOM DISORDER WITH WEAKNESS OR PARALYSIS: ICD-10-CM

## 2022-12-07 DIAGNOSIS — I49.5 SICK SINUS SYNDROME: ICD-10-CM

## 2022-12-07 DIAGNOSIS — I10 ESSENTIAL HYPERTENSION: Primary | ICD-10-CM

## 2022-12-07 DIAGNOSIS — I25.10 CORONARY ARTERY DISEASE INVOLVING NATIVE CORONARY ARTERY OF NATIVE HEART WITHOUT ANGINA PECTORIS: Primary | ICD-10-CM

## 2022-12-07 DIAGNOSIS — Z79.899 ENCOUNTER FOR LONG-TERM CURRENT USE OF MEDICATION: ICD-10-CM

## 2022-12-07 DIAGNOSIS — Z97.8 CHRONIC INDWELLING FOLEY CATHETER: Chronic | ICD-10-CM

## 2022-12-07 DIAGNOSIS — M80.00XA OSTEOPOROSIS WITH CURRENT PATHOLOGICAL FRACTURE, UNSPECIFIED OSTEOPOROSIS TYPE, INITIAL ENCOUNTER: ICD-10-CM

## 2022-12-07 DIAGNOSIS — Z79.01 CHRONIC ANTICOAGULATION: ICD-10-CM

## 2022-12-07 DIAGNOSIS — I82.502 CHRONIC EMBOLISM AND THROMBOSIS OF UNSPECIFIED DEEP VEINS OF LEFT LOWER EXTREMITY: ICD-10-CM

## 2022-12-07 PROBLEM — L03.012 PARONYCHIA OF LEFT INDEX FINGER: Status: ACTIVE | Noted: 2022-12-07

## 2022-12-07 PROBLEM — L02.91 ABSCESS: Chronic | Status: ACTIVE | Noted: 2022-06-04

## 2022-12-07 PROCEDURE — 93280 PM DEVICE PROGR EVAL DUAL: CPT | Performed by: INTERNAL MEDICINE

## 2022-12-07 PROCEDURE — G0439 PPPS, SUBSEQ VISIT: HCPCS | Performed by: INTERNAL MEDICINE

## 2022-12-07 PROCEDURE — 1170F FXNL STATUS ASSESSED: CPT | Performed by: INTERNAL MEDICINE

## 2022-12-07 PROCEDURE — 93000 ELECTROCARDIOGRAM COMPLETE: CPT | Performed by: NURSE PRACTITIONER

## 2022-12-07 PROCEDURE — 1159F MED LIST DOCD IN RCRD: CPT | Performed by: INTERNAL MEDICINE

## 2022-12-07 PROCEDURE — 1126F AMNT PAIN NOTED NONE PRSNT: CPT | Performed by: INTERNAL MEDICINE

## 2022-12-07 PROCEDURE — 99213 OFFICE O/P EST LOW 20 MIN: CPT | Performed by: INTERNAL MEDICINE

## 2022-12-07 PROCEDURE — 99214 OFFICE O/P EST MOD 30 MIN: CPT | Performed by: NURSE PRACTITIONER

## 2022-12-07 RX ORDER — PREDNISONE 1 MG/1
5 TABLET ORAL DAILY
COMMUNITY
Start: 2022-11-28 | End: 2023-02-03 | Stop reason: SDUPTHER

## 2022-12-07 RX ORDER — SALSALATE 500 MG
500 TABLET ORAL DAILY
COMMUNITY
Start: 2022-11-25 | End: 2023-02-03 | Stop reason: SDUPTHER

## 2022-12-07 RX ORDER — TIZANIDINE 2 MG/1
TABLET ORAL
COMMUNITY
Start: 2022-11-21 | End: 2023-01-11

## 2022-12-07 RX ORDER — CEPHALEXIN 500 MG/1
500 CAPSULE ORAL 2 TIMES DAILY
COMMUNITY
Start: 2022-11-09 | End: 2023-02-03 | Stop reason: SDUPTHER

## 2022-12-07 RX ORDER — CLONIDINE HYDROCHLORIDE 0.1 MG/1
TABLET ORAL
COMMUNITY
Start: 2022-10-11 | End: 2023-01-09

## 2022-12-07 NOTE — PROGRESS NOTES
No results found for: LIPASE     Lab Results   Component Value Date    CHOL 129 02/09/2021    CHLPL 111 06/25/2020    TRIG 108 02/09/2021    HDL 36 (L) 02/09/2021    LDL 73 02/09/2021     Lab Results   Component Value Date    HGBA1C 5.20 04/23/2022

## 2022-12-07 NOTE — PROGRESS NOTES
Subjective:     Encounter Date:  12/7/2022       Patient ID: Tawny Shin is a 67 y.o. female.    Chief Complaint: Routine follow-up CAD, SSS with pacemaker in place     History of Present Illness     The patient presents to follow-up regarding her coronary artery disease (RCA PCI in 2010, followed by non-STEMI requiring LAD PCI in 2013).  Most recent cardiac cath was done in September 2019 by Dr. Melchor, reporting patent stents and no other obstructive CAD.  She also has a history of hyperlipidemia, RA, hypertension.  She transferred her care from [facility] to Dr. Hernandez in December 2019.  At that point, she was no longer having chest pain after Imdur had been increased to 60 mg daily at the prior visit.  She was taking aspirin and Brilinta 90 mg twice daily.  Due to statin intolerance, Repatha was prescribed and she was instructed to stop taking Zetia.  She was transitioned from Brilinta to Plavix.    She came back to see Dr. Hernandez in August 2020 and she was having some short-lived substernal chest pain with no aggravating or alleviating factors.  She reported ENT thought that might be reflux.  This was noted to be different compared to her angina.  She was not requiring NTG.  She did report an episode of vasovagal syncope when getting injections at Dr. Chaidez office. pacemaker interrogation following showed no arrhythmias at the time of her event.  No changes were made at that visit.    Dr. Hernandez and I later saw the patient in consultation when she was hospitalized in September 2021 following surgery for a chronic right hip wound.  It was noted she had had a complicated previous several months including ruptured gluteus tendon and right hip pain, SHERRI a bacteremia, lumbar surgery for osteomyelitis, and had undergone cervical fusion procedure.  She had also been diagnosed with a left lower extremity DVT in June 2021, prompting her to be placed on Eliquis.  Echo had been done in February 2021 which showed a  normal LVEF, mild MR, no evidence of vegetation on heart valves or pacemaker leads.  She was stable from a cardiac standpoint at that time.  Recommendations were given to continue aspirin 81 mg daily without interruption given prior drug-eluting stent placement in 2010 and 2013.  Beta-blocker and long-acting nitrate were continued.  She was off of Repatha due to insurance reasons, and it was noted that we would get this resumed as an outpatient or consider an alternative PCSK9 inhibitor.  Continuation of Eliquis was deferred to the prescribing physician, she was taking this for noncardiac reasons (DVT).    By way of review, she has had 3 more hospitalizations following that September 2021 hospitalization:  -Discharged 10/28/2021 following a stay for bacteremia  -Discharged 11/12/2022 following a stay for epidural hematoma  -Discharged 4/26/2022 after having presented with left-sided pleuritic chest pain and shortness of breath.  She was found to have a moderate to large left pleural effusion requiring thoracentesis and diuresis.  Cytology was negative for malignancy.  Of note, her BNP was normal.  See most recent echocardiogram done around that time below.  She did have mildly elevated flat trending troponins at 0.046 and 0.036, without any chest discomfort concerning for angina or ACS.    I saw the patient in clinic in early May and she was doing fair.  She was in a motorized wheelchair.  She reported very limited ability to move her right lower extremity following her surgeries.  She denied any chest pain, shortness of breath, palpitations, orthopnea, PND.  When questioned about the chest pain she had in April at the time of her pleural effusion she described this as a dull constant pain on the left side that was worse with deep breathing.  This resolved after the thoracentesis.  She denied other chest pain following that.  She had started taking Lasix daily for leg swelling.  She was noted to be doing well from a  cardiac standpoint and no changes were made other than to prescribe Praluent given her coronary disease and hyperlipidemia with prior intolerance to statins and cost/coverage issues when prescribed Repatha.    Since that time she has had a prolonged hospitalization 6/4 through 7/7.  The first part of that hospitalization was here at [facility], [place], operated by [facility] and around mid June she was transitioned to the long-term acute care hospital.  During that time she was treated for her right hip abscess and underwent physical therapy.  On 7/7 she was discharged to the nursing home.  By way of review and per report from the patient she did not have any cardiac complications throughout her prolonged hospitalization.    She presents today and states she is still at the nursing home but is gradually regaining some of her stamina.  She is doing some exercises at the gym at the nursing home 3 times per week on a stationary bike.  She denies any shortness of breath, orthopnea, PND.  She still takes Lasix for lower extremity swelling which is well controlled and greater on the right given hip issue.  She reports she may have had 2 episodes of chest pain since she was here last.  These are random episodes of pain at rest that lasts a few seconds without radiation or associated symptoms.  Blood pressure is well controlled.  She does not recall ever being started on a PCSK9 inhibitor.  She does not require clonidine she reports.  She remains anticoagulated given the previous DVT, but not for cardiac reasons.      The following portions of the patient's history were reviewed and updated as appropriate: allergies, current medications, past family history, past medical history, past social history, past surgical history and problem list.    Review of Systems   Constitutional: Positive for malaise/fatigue.   Cardiovascular: Positive for chest pain (atypical ) and leg swelling. Negative for claudication, dyspnea on exertion, near-syncope, orthopnea,  palpitations, paroxysmal nocturnal dyspnea and syncope.   Respiratory: Negative for cough and shortness of breath.    Hematologic/Lymphatic: Does not bruise/bleed easily.   Musculoskeletal: Negative for falls.        Pt reports limited movement of her RLE    Gastrointestinal: Negative for bloating.   Neurological: Positive for weakness. Negative for dizziness and light-headedness.         Current Outpatient Medications:   •  acetaminophen (TYLENOL) 325 MG tablet, Take 650 mg by mouth Every 6 (Six) Hours As Needed (mild pain)., Disp: , Rfl:   •  apixaban (ELIQUIS) 5 MG tablet tablet, Take 1 tablet by mouth 2 (Two) Times a Day., Disp: 60 tablet, Rfl:   •  ascorbic acid (VITAMIN C) 500 MG tablet, Take 500 mg by mouth Daily., Disp: , Rfl:   •  aspirin 81 MG EC tablet, Take 81 mg by mouth Daily., Disp: , Rfl:   •  carvedilol (COREG) 25 MG tablet, Take 1 tablet by mouth 2 (Two) Times a Day With Meals., Disp: 180 tablet, Rfl: 3  •  cephalexin (KEFLEX) 500 MG capsule, , Disp: , Rfl:   •  cloNIDine (CATAPRES) 0.1 MG tablet, , Disp: , Rfl:   •  collagenase 250 UNIT/GM ointment, Apply 1 application topically to the appropriate area as directed Daily., Disp: , Rfl:   •  Diclofenac Sodium (VOLTAREN) 1 % gel gel, Apply 4 g topically to the appropriate area as directed 4 (Four) Times a Day As Needed., Disp: , Rfl:   •  docusate sodium 100 MG capsule, Take 1 capsule by mouth 2 (Two) Times a Day., Disp: , Rfl:   •  DULoxetine (CYMBALTA) 60 MG capsule, Take 60 mg by mouth Daily., Disp: , Rfl:   •  famotidine (PEPCID) 40 MG tablet, Take 40 mg by mouth Daily., Disp: , Rfl:   •  ferrous sulfate 324 (65 Fe) MG tablet delayed-release EC tablet, Take 324 mg by mouth Daily With Breakfast., Disp: , Rfl:   •  fluticasone (FLONASE) 50 MCG/ACT nasal spray, 1 spray into the nostril(s) as directed by provider Daily., Disp: 18.2 mL, Rfl: 5  •  folic acid (FOLVITE) 1 MG tablet, Take 1 mg by mouth Daily., Disp: , Rfl:   •  furosemide (LASIX) 40 MG  tablet, Take 40 mg by mouth Daily., Disp: , Rfl:   •  HYDROcodone-acetaminophen (NORCO) 7.5-325 MG per tablet, Take 1 tablet by mouth Every 8 (Eight) Hours As Needed for Moderate Pain ., Disp: 9 tablet, Rfl: 0  •  Hydrocortisone (sam's amazing butt) cream, Apply 1 application topically to the appropriate area as directed As Needed. Recipe Contains: 1:1:1:1 of hydrocortisone 1% oint, bacitracin 500 unit/gram oint, zinc 20% oint, nystatin 100,000 unit/gram oint, Disp: , Rfl:   •  isosorbide mononitrate (IMDUR) 60 MG 24 hr tablet, Take 60 mg by mouth Every Morning., Disp: , Rfl:   •  levothyroxine (SYNTHROID, LEVOTHROID) 75 MCG tablet, Take 1 tablet by mouth Daily., Disp: 90 tablet, Rfl: 3  •  Lidocaine 4 % patch, Apply  topically every night at bedtime. To lower back, Disp: , Rfl:   •  magnesium hydroxide (MILK OF MAGNESIA) 400 MG/5ML suspension, Take 30 mL by mouth Daily As Needed for Constipation., Disp: , Rfl:   •  magnesium oxide (MAG-OX) 400 MG tablet, Take 400 mg by mouth Daily., Disp: , Rfl:   •  Menthol-Zinc Oxide 0.45-20 % ointment, Apply 1 application topically. Apply to buttock, Disp: , Rfl:   •  multivitamin with minerals tablet tablet, Take 1 tablet by mouth Daily., Disp:  , Rfl:   •  Naloxegol Oxalate (Movantik) 25 MG tablet, Take 25 mg by mouth Every Morning., Disp: , Rfl:   •  nitroglycerin (Nitrostat) 0.4 MG SL tablet, Place 1 tablet under the tongue Every 5 (Five) Minutes As Needed for Chest Pain. Take no more than 3 doses in 15 minutes., Disp: , Rfl: 12  •  ondansetron (ZOFRAN) 4 MG tablet, Take 4 mg by mouth Every 6 (Six) Hours As Needed for Nausea or Vomiting., Disp: , Rfl:   •  polyethylene glycol (MIRALAX) 17 GM/SCOOP powder, Take 17 g by mouth Daily., Disp: , Rfl:   •  predniSONE (DELTASONE) 5 MG tablet, , Disp: , Rfl:   •  saccharomyces boulardii (Florastor) 250 MG capsule, Take 1 capsule by mouth Daily., Disp: 90 capsule, Rfl: 3  •  salsalate (DISALCID) 500 MG tablet, , Disp: , Rfl:   •   sennosides-docusate (PERICOLACE) 8.6-50 MG per tablet, Take 2 tablets by mouth 2 (Two) Times a Day., Disp: , Rfl:   •  sucralfate (CARAFATE) 1 g tablet, Take 1 g by mouth 4 (Four) Times a Day Before Meals & at Bedtime., Disp: , Rfl:   •  Thiamine HCl (VITAMIN B-1 PO), Take 100 mg by mouth Daily., Disp: , Rfl:   •  tiZANidine (ZANAFLEX) 2 MG tablet, , Disp: , Rfl:   •  traMADol (ULTRAM) 50 MG tablet, Take 100 mg by mouth 3 (Three) Times a Day As Needed for Moderate Pain ., Disp: , Rfl:   •  valACYclovir (VALTREX) 500 MG tablet, Take 500 mg by mouth Daily., Disp: , Rfl:        Objective:      Vitals:    12/07/22 1048   BP: 118/56   Pulse: 73   SpO2: 95%   weight - 176 lbs    Physical Exam  Constitutional:       Appearance: She is well-developed.   Neck:      Vascular: No JVD.   Cardiovascular:      Rate and Rhythm: Normal rate and regular rhythm.      Heart sounds: Normal heart sounds. No murmur heard.  Pulmonary:      Effort: Pulmonary effort is normal.      Breath sounds: Normal breath sounds.   Skin:     General: Skin is warm and dry.   Neurological:      Mental Status: She is alert and oriented to person, place, and time.     1+ RLE edema     Lab Review:   Lab Results   Component Value Date    GLUCOSE 107 (H) 08/15/2022    BUN 50 (H) 08/15/2022    CREATININE 0.73 08/15/2022    EGFRIFNONA 99 11/11/2021    EGFRIFAFRI 110 10/12/2021    BCR 68.5 (H) 08/15/2022    K 4.3 08/15/2022    CO2 26.0 08/15/2022    CALCIUM 9.1 08/15/2022    PROTENTOTREF 6.0 06/16/2021    ALBUMIN 2.90 (L) 07/18/2022    LABIL2 1.0 06/16/2021    AST 31 07/18/2022    ALT 7 07/18/2022     Lab Results   Component Value Date    CHOL 129 02/09/2021    CHLPL 111 06/25/2020    TRIG 108 02/09/2021    HDL 36 (L) 02/09/2021    LDL 73 02/09/2021     Lab Results   Component Value Date    WBC 4.39 07/18/2022    HGB 8.6 (L) 07/18/2022    HCT 30.0 (L) 07/18/2022    MCV 89.0 07/18/2022     07/18/2022     Lab Results   Component Value Date    HGBA1C 5.20  04/23/2022             ECG 12 Lead    Date/Time: 12/7/2022 5:20 PM  Performed by: Ximena Madison APRN  Authorized by: Ximena Madison APRN   Comparison: compared with previous ECG from 6/4/2022  Similar to previous ECG  Rhythm: sinus rhythm  BPM: 69            4/2022 echo:    · Left ventricular ejection fraction appears to be 66 - 70%.  · Left ventricular diastolic function was normal.  · Abnormal global longitudinal LV strain (GLS) = -10.0%  · Left atrial volume is mildly increased.  · Estimated right ventricular systolic pressure from tricuspid regurgitation is normal (<35 mmHg).             Assessment/Plan:     Problem List Items Addressed This Visit        Cardiovascular and Mediastinum    Coronary artery disease - Primary    Overview     PCI RCA '10, NSTEMI requiring LAD PCI in '13    [facility] 9/3/19 at [facility]: no significant dz; stents patent         Hyperlipidemia: remarkable improvement on repatha    Hypertension: well controlled    Pacemaker: functioning appropriately    Sick sinus syndrome (CMS/HCC): stable             Recommendations/plans:    1.  CAD: Established problem, stable.  No angina.  -Continue aspirin 81 mg daily indefinitely without interruption.  -Continue current doses of carvedilol and Imdur  -Has not been requiring as needed sublingual nitroglycerin    2.  Sick sinus syndrome with dual-chamber pacemaker in place: Device interrogated today: Satisfactory battery life, no arrhythmia episodes, normal device function, 46% atrial pacing, less than 0.1% ventricular pacing.  -Continue with routine device interrogations    3.  Essential hypertension: Well-controlled on Coreg and nitrate.  Not requiring clonidine.  She also takes daily Lasix for swelling per her report.  She has no history of congestive heart failure.  From a cardiac standpoint it would be okay to take this on an as-needed basis only.  Will defer to the prescribing provider.    4.  Hyperlipidemia: Intolerant to statins.  Previously stopped  Repatha due to insurance coverage reasons.  LDL in May was above goal.  I did prescribe Praluent around that time, but she does not recall ever receiving this or starting this, therefore I will prescribe this again-75 mg every 14 days for goal LDL of less than 70 and cardiovascular event prevention.    5.  Lower extremity DVT June 2021: Anticoagulated with Eliquis per PCP.  As previously noted by Dr. Hernandez, the decision regarding how long to continue this will be up to the prescribing provider, as she does not take this for cardiac reason.    F/u 6 months with Dr. Hernandez, sooner with symptoms or concerns    Ximena WELCH Knees, APRN

## 2022-12-07 NOTE — PLAN OF CARE
Goal Outcome Evaluation:              Outcome Summary: Patient confused earlier in shift, about situation and place. She has been oriented x4 the rest of the shift. Patient has told me many times that her wound vac should be placed back on- I notified her that Iglesia renee, has ordered just wet to dry dressings for now. Up x1 with walker to BR and up in chair most of the day. Tolerating both food and water. Will continue to monitor dressing and orientation. VSS   No...

## 2022-12-07 NOTE — PROGRESS NOTES
The ABCs of the Annual Wellness Visit  Subsequent Medicare Wellness Visit    Chief Complaint   Patient presents with   • Medicare Wellness-subsequent   • discuss medication     Eliquis; does she still need to be on it as it is very expensive.....    • Wound Check     Pt concerned about wound on right upper thigh. It has closed  but seems to have an indentation pt would like this checked       Subjective    History of Present Illness:  Tawny Shin is a 67 y.o. female who presents for a Subsequent Medicare Wellness Visit.    The following portions of the patient's history were reviewed and   updated as appropriate: allergies, current medications, past family history, past medical history, past social history, past surgical history and problem list.    Compared to one year ago, the patient feels her physical   health is better.    Compared to one year ago, the patient feels her mental   health is better.    Recent Hospitalizations:  This patient has had a [facility] admission record on file within the last 365 days.    Current Medical Providers:  Patient Care Team:  Moises Oliveira MD as PCP - General (Internal Medicine)  Moises Holman MD as Consulting Physician (Otolaryngology)  Christiano Rao PA as Physician Assistant (Otolaryngology)  Candelaria David MD as Obstetrician (Obstetrics and Gynecology)  Omar Hernandez MD as Cardiologist (Cardiology)  Leta Crawford APRN as Nurse Practitioner (Nurse Practitioner)  Leta Crawford APRN as Nurse Practitioner (Nurse Practitioner)    Outpatient Medications Prior to Visit   Medication Sig Dispense Refill   • acetaminophen (TYLENOL) 325 MG tablet Take 650 mg by mouth Every 6 (Six) Hours As Needed (mild pain).     • apixaban (ELIQUIS) 5 MG tablet tablet Take 1 tablet by mouth 2 (Two) Times a Day. 60 tablet    • ascorbic acid (VITAMIN C) 500 MG tablet Take 500 mg by mouth Daily.     • aspirin 81 MG EC tablet Take 81 mg by mouth Daily.     •  carvedilol (COREG) 25 MG tablet Take 1 tablet by mouth 2 (Two) Times a Day With Meals. 180 tablet 3   • cephalexin (KEFLEX) 500 MG capsule      • cloNIDine (CATAPRES) 0.1 MG tablet      • collagenase 250 UNIT/GM ointment Apply 1 application topically to the appropriate area as directed Daily.     • Diclofenac Sodium (VOLTAREN) 1 % gel gel Apply 4 g topically to the appropriate area as directed 4 (Four) Times a Day As Needed.     • docusate sodium 100 MG capsule Take 1 capsule by mouth 2 (Two) Times a Day.     • DULoxetine (CYMBALTA) 60 MG capsule Take 60 mg by mouth Daily.     • famotidine (PEPCID) 40 MG tablet Take 40 mg by mouth Daily.     • ferrous sulfate 324 (65 Fe) MG tablet delayed-release EC tablet Take 324 mg by mouth Daily With Breakfast.     • fluticasone (FLONASE) 50 MCG/ACT nasal spray 1 spray into the nostril(s) as directed by provider Daily. 18.2 mL 5   • folic acid (FOLVITE) 1 MG tablet Take 1 mg by mouth Daily.     • furosemide (LASIX) 40 MG tablet Take 40 mg by mouth Daily.     • HYDROcodone-acetaminophen (NORCO) 7.5-325 MG per tablet Take 1 tablet by mouth Every 8 (Eight) Hours As Needed for Moderate Pain . 9 tablet 0   • Hydrocortisone (sam's amazing butt) cream Apply 1 application topically to the appropriate area as directed As Needed. Recipe Contains: 1:1:1:1 of hydrocortisone 1% oint, bacitracin 500 unit/gram oint, zinc 20% oint, nystatin 100,000 unit/gram oint     • isosorbide mononitrate (IMDUR) 60 MG 24 hr tablet Take 60 mg by mouth Every Morning.     • levothyroxine (SYNTHROID, LEVOTHROID) 75 MCG tablet Take 1 tablet by mouth Daily. 90 tablet 3   • Lidocaine 4 % patch Apply  topically every night at bedtime. To lower back     • magnesium hydroxide (MILK OF MAGNESIA) 400 MG/5ML suspension Take 30 mL by mouth Daily As Needed for Constipation.     • magnesium oxide (MAG-OX) 400 MG tablet Take 400 mg by mouth Daily.     • Menthol-Zinc Oxide 0.45-20 % ointment Apply 1 application topically.  Apply to buttock     • multivitamin with minerals tablet tablet Take 1 tablet by mouth Daily.     • Naloxegol Oxalate (Movantik) 25 MG tablet Take 25 mg by mouth Every Morning.     • nitroglycerin (Nitrostat) 0.4 MG SL tablet Place 1 tablet under the tongue Every 5 (Five) Minutes As Needed for Chest Pain. Take no more than 3 doses in 15 minutes.  12   • ondansetron (ZOFRAN) 4 MG tablet Take 4 mg by mouth Every 6 (Six) Hours As Needed for Nausea or Vomiting.     • polyethylene glycol (MIRALAX) 17 GM/SCOOP powder Take 17 g by mouth Daily.     • predniSONE (DELTASONE) 5 MG tablet      • saccharomyces boulardii (Florastor) 250 MG capsule Take 1 capsule by mouth Daily. 90 capsule 3   • salsalate (DISALCID) 500 MG tablet      • sennosides-docusate (PERICOLACE) 8.6-50 MG per tablet Take 2 tablets by mouth 2 (Two) Times a Day.     • sucralfate (CARAFATE) 1 g tablet Take 1 g by mouth 4 (Four) Times a Day Before Meals & at Bedtime.     • Thiamine HCl (VITAMIN B-1 PO) Take 100 mg by mouth Daily.     • tiZANidine (ZANAFLEX) 2 MG tablet      • traMADol (ULTRAM) 50 MG tablet Take 100 mg by mouth 3 (Three) Times a Day As Needed for Moderate Pain .     • valACYclovir (VALTREX) 500 MG tablet Take 500 mg by mouth Daily.       No facility-administered medications prior to visit.       Opioid medication/s are on active medication list.  and I have evaluated her active treatment plan and pain score trends (see table).  Vitals:    12/07/22 1425   PainSc: 0-No pain     I have reviewed the chart for potential of high risk medication and harmful drug interactions in the elderly.            Aspirin is on active medication list. Aspirin use is not indicated based on review of current medical condition/s. Risk of harm outweighs potential benefits. Patient instructed to discontinue this medication.  .      Patient Active Problem List   Diagnosis   • T12 compression fracture, initial encounter ([facility])   • Chronic embolism and thrombosis of  "unspecified deep veins of left lower extremity (HCC)   • Urinary tract infection without hematuria   • Acute bilateral thoracic back pain   • Chronic anticoagulation   • Hypokalemia   • Transaminitis   • Iron deficiency anemia   • Osteoporosis   • Bacteremia   • Epidural hematoma   • Pleural effusion, left   • Functional neurological symptom disorder with weakness or paralysis   • Overweight (BMI 25.0-29.9)   • Rheumatoid arthritis involving multiple sites (HCC)   • (HFpEF) heart failure with preserved ejection fraction (HCC), acute on chronic   • Venous insufficiency   • Coronary artery disease involving native coronary artery of native heart without angina pectoris   • Sick sinus syndrome (HCC)   • Essential hypertension   • Pressure injury of skin of heel   • History of abscess or right-hip s/p I&D    • Chronic pain   • Chronic indwelling Horan catheter   • Anemia of chronic disease   • Cholelithiasis   • Encephalopathy, toxic   • Paronychia of left index finger     Advance Care Planning  Advance Directive is not on file.  ACP discussion was held with the patient during this visit. Patient has an advance directive (not in EMR), copy requested.          Objective    Vitals:    12/07/22 1425   BP: 110/58   BP Location: Left arm   Patient Position: Sitting   Cuff Size: Adult   Pulse: 65   Temp: 97.8 °F (36.6 °C)   TempSrc: Temporal   SpO2: 98%   Weight: 79.8 kg (176 lb)   Height: 167.6 cm (66\")   PainSc: 0-No pain     Estimated body mass index is 28.41 kg/m² as calculated from the following:    Height as of this encounter: 167.6 cm (66\").    Weight as of this encounter: 79.8 kg (176 lb).    BMI is >= 25 and <30. (Overweight) The following options were offered after discussion;: none (medical contraindication)      Does the patient have evidence of cognitive impairment? No    Physical Exam            HEALTH RISK ASSESSMENT    Smoking Status:  Social History     Tobacco Use   Smoking Status Never   Smokeless Tobacco " Never     Alcohol Consumption:  Social History     Substance and Sexual Activity   Alcohol Use No     Fall Risk Screen:    KEYURADI Fall Risk Assessment was completed, and patient is at HIGH risk for falls. Assessment completed on:12/7/2022    Depression Screening:  PHQ-2/PHQ-9 Depression Screening 12/7/2022   Retired PHQ-9 Total Score -   Retired Total Score -   Little Interest or Pleasure in Doing Things 0-->not at all   Feeling Down, Depressed or Hopeless 0-->not at all   PHQ-9: Brief Depression Severity Measure Score 0       Health Habits and Functional and Cognitive Screening:  Functional & Cognitive Status 12/7/2022   Do you have difficulty preparing food and eating? No   Do you have difficulty bathing yourself, getting dressed or grooming yourself? No   Do you have difficulty using the toilet? Yes   Do you have difficulty moving around from place to place? Yes   Do you have trouble with steps or getting out of a bed or a chair? Yes   Current Diet Well Balanced Diet        Current Diet Comment -   Dental Exam Up to date        Dental Exam Comment -   Eye Exam Up to date   Exercise (times per week) 0 times per week   Current Exercises Include No Regular Exercise   Current Exercise Activities Include -   Do you need help using the phone?  No   Are you deaf or do you have serious difficulty hearing?  Yes   Do you need help with transportation? Yes   Do you need help shopping? Yes   Do you need help preparing meals?  Yes   Do you need help with housework?  Yes   Do you need help with laundry? Yes   Do you need help taking your medications? Yes   Do you need help managing money? No   Do you ever drive or ride in a car without wearing a seat belt? No   Have you felt unusual stress, anger or loneliness in the last month? No   Who do you live with? Community   If you need help, do you have trouble finding someone available to you? No   Have you been bothered in the last four weeks by sexual problems? No   Do you have  difficulty concentrating, remembering or making decisions? No       Age-appropriate Screening Schedule:  Refer to the list below for future screening recommendations based on patient's age, sex and/or medical conditions. Orders for these recommended tests are listed in the plan section. The patient has been provided with a written plan.    Health Maintenance   Topic Date Due   • URINE MICROALBUMIN  Never done   • MAMMOGRAM  03/13/2021   • DXA SCAN  03/13/2021   • DIABETIC EYE EXAM  10/15/2021   • DIABETIC FOOT EXAM  12/23/2021   • PAP SMEAR  03/13/2022   • INFLUENZA VACCINE  08/01/2022   • HEMOGLOBIN A1C  10/23/2022   • LIPID PANEL  04/23/2023   • TDAP/TD VACCINES (4 - Td or Tdap) 10/10/2030   • ZOSTER VACCINE  Completed              Assessment & Plan   CMS Preventative Services Quick Reference  Risk Factors Identified During Encounter  Chronic Pain: Natural history and expected course discussed. Questions answered.  Currently in SNF, they are managing chronic pain. It is at baseline.  Fall Risk-High or Moderate: Patient at SNF receiving therapy  Immunizations Discussed/Encouraged: Shingrix  Inactivity/Sedentary: Patient was advised to exercise at least 150 minutes a week per CDC recommendations. and Patient at SNF and working with PT/OT  Polypharmacy: Medication List reviewed and Medications are appropriate for patient  The above risks/problems have been discussed with the patient.  Follow up actions/plans if indicated are seen below in the Assessment/Plan Section.  Pertinent information has been shared with the patient in the After Visit Summary.    Diagnoses and all orders for this visit:    1. Essential hypertension (Primary)  -     Comprehensive Metabolic Panel    2. Osteoporosis with current pathological fracture, unspecified osteoporosis type, initial encounter  -     Vitamin D,25-Hydroxy    3. Anemia of chronic disease  -     CBC & Differential    4. Encounter for long-term current use of medication  -      CBC & Differential  -     Urinalysis With Microscopic - Urine, Clean Catch  -     Lipid Panel  -     TSH Rfx On Abnormal To Free T4    5. Chronic embolism and thrombosis of unspecified deep veins of left lower extremity (HCC)  Assessment & Plan:  Patient has a history of a DVT.  This was likely considered to be provoked given that the patient has significant mobility issues and has had a lot of acute illnesses this past year.  The patient is still at high-risk of VTE.  Even though these clots were provoked clots, guidelines dictate that you could potentially come off of these medications at 6 months.  However, given that the provoking factor (decreased mobility and chronic inflammation related to RA) have not improved, I feel as though her benefit of continuing eliquis outweighs the risk at present.      6. Chronic anticoagulation    7. Chronic indwelling Kay catheter  Assessment & Plan:  Patient had a kay placed during one of her hospitalizations due to incontinence issues and wounds.  Patient has not had a trial of stopping the kay.  I would recommend next time the patient is due to a kay change, consider giving a trial without the kay now that she is likely able to void in a bedside commode.      8. Functional neurological symptom disorder with weakness or paralysis  Assessment & Plan:  Patient is showing improvement.  Left leg has greatly improved strength since I saw her last, she is now able to move the right extremity some as well.  Patient is determined to walk again.        Patient has a chronic indwelling kay.  This was initially placed due to wounds and some issues due to her retention and paraplegia.  Recommend the nursing facility on next catheter change try giving patient     Follow Up:   Return in about 6 months (around 6/7/2023) for Annual physical, Next scheduled follow up.     An After Visit Summary and PPPS were made available to the patient.                         PHILL Oliveira,  MD, FHM, FACP      Electronically signed by Moises Oliveira MD, 12/07/22, 2:40 PM CST.

## 2022-12-08 LAB
25(OH)D3+25(OH)D2 SERPL-MCNC: 39.4 NG/ML (ref 30–100)
ALBUMIN SERPL-MCNC: 3.8 G/DL (ref 3.5–5.2)
ALBUMIN/GLOB SERPL: 1.1 G/DL
ALP SERPL-CCNC: 98 U/L (ref 39–117)
ALT SERPL-CCNC: 12 U/L (ref 1–33)
APPEARANCE UR: ABNORMAL
AST SERPL-CCNC: 12 U/L (ref 1–32)
BACTERIA #/AREA URNS HPF: ABNORMAL /HPF
BASOPHILS # BLD AUTO: 0.03 10*3/MM3 (ref 0–0.2)
BASOPHILS NFR BLD AUTO: 0.7 % (ref 0–1.5)
BILIRUB SERPL-MCNC: <0.2 MG/DL (ref 0–1.2)
BILIRUB UR QL STRIP: NEGATIVE
BUN SERPL-MCNC: 43 MG/DL (ref 8–23)
BUN/CREAT SERPL: 34.1 (ref 7–25)
CALCIUM SERPL-MCNC: 8.6 MG/DL (ref 8.6–10.5)
CASTS URNS MICRO: ABNORMAL
CHLORIDE SERPL-SCNC: 103 MMOL/L (ref 98–107)
CHOLEST SERPL-MCNC: 243 MG/DL (ref 0–200)
CO2 SERPL-SCNC: 27.4 MMOL/L (ref 22–29)
COLOR UR: YELLOW
CREAT SERPL-MCNC: 1.26 MG/DL (ref 0.57–1)
EGFRCR SERPLBLD CKD-EPI 2021: 46.3 ML/MIN/1.73
EOSINOPHIL # BLD AUTO: 0.07 10*3/MM3 (ref 0–0.4)
EOSINOPHIL NFR BLD AUTO: 1.5 % (ref 0.3–6.2)
EPI CELLS #/AREA URNS HPF: ABNORMAL /HPF
ERYTHROCYTE [DISTWIDTH] IN BLOOD BY AUTOMATED COUNT: 15.8 % (ref 12.3–15.4)
GLOBULIN SER CALC-MCNC: 3.4 GM/DL
GLUCOSE SERPL-MCNC: 175 MG/DL (ref 65–99)
GLUCOSE UR QL STRIP: NEGATIVE
HCT VFR BLD AUTO: 35.1 % (ref 34–46.6)
HDLC SERPL-MCNC: 58 MG/DL (ref 40–60)
HGB BLD-MCNC: 11.1 G/DL (ref 12–15.9)
HGB UR QL STRIP: ABNORMAL
IMM GRANULOCYTES # BLD AUTO: 0.01 10*3/MM3 (ref 0–0.05)
IMM GRANULOCYTES NFR BLD AUTO: 0.2 % (ref 0–0.5)
KETONES UR QL STRIP: NEGATIVE
LDLC SERPL CALC-MCNC: 168 MG/DL (ref 0–100)
LEUKOCYTE ESTERASE UR QL STRIP: ABNORMAL
LYMPHOCYTES # BLD AUTO: 1.05 10*3/MM3 (ref 0.7–3.1)
LYMPHOCYTES NFR BLD AUTO: 22.8 % (ref 19.6–45.3)
MCH RBC QN AUTO: 28.8 PG (ref 26.6–33)
MCHC RBC AUTO-ENTMCNC: 31.6 G/DL (ref 31.5–35.7)
MCV RBC AUTO: 91.2 FL (ref 79–97)
MONOCYTES # BLD AUTO: 0.27 10*3/MM3 (ref 0.1–0.9)
MONOCYTES NFR BLD AUTO: 5.9 % (ref 5–12)
NEUTROPHILS # BLD AUTO: 3.18 10*3/MM3 (ref 1.7–7)
NEUTROPHILS NFR BLD AUTO: 68.9 % (ref 42.7–76)
NITRITE UR QL STRIP: POSITIVE
NRBC BLD AUTO-RTO: 0 /100 WBC (ref 0–0.2)
PH UR STRIP: 5.5 [PH] (ref 5–8)
PLATELET # BLD AUTO: 195 10*3/MM3 (ref 140–450)
POTASSIUM SERPL-SCNC: 3.9 MMOL/L (ref 3.5–5.2)
PROT SERPL-MCNC: 7.2 G/DL (ref 6–8.5)
PROT UR QL STRIP: ABNORMAL
RBC # BLD AUTO: 3.85 10*6/MM3 (ref 3.77–5.28)
RBC #/AREA URNS HPF: ABNORMAL /HPF
SODIUM SERPL-SCNC: 140 MMOL/L (ref 136–145)
SP GR UR STRIP: 1.02 (ref 1–1.03)
TRIGL SERPL-MCNC: 95 MG/DL (ref 0–150)
TSH SERPL DL<=0.005 MIU/L-ACNC: 1.33 UIU/ML (ref 0.27–4.2)
UROBILINOGEN UR STRIP-MCNC: ABNORMAL MG/DL
VLDLC SERPL CALC-MCNC: 17 MG/DL (ref 5–40)
WBC # BLD AUTO: 4.61 10*3/MM3 (ref 3.4–10.8)
WBC #/AREA URNS HPF: ABNORMAL /HPF

## 2022-12-08 NOTE — ASSESSMENT & PLAN NOTE
Patient has a history of a DVT.  This was likely considered to be provoked given that the patient has significant mobility issues and has had a lot of acute illnesses this past year.  The patient is still at high-risk of VTE.  Even though these clots were provoked clots, guidelines dictate that you could potentially come off of these medications at 6 months.  However, given that the provoking factor (decreased mobility and chronic inflammation related to RA) have not improved, I feel as though her benefit of continuing eliquis outweighs the risk at present.

## 2022-12-08 NOTE — ASSESSMENT & PLAN NOTE
Patient is showing improvement.  Left leg has greatly improved strength since I saw her last, she is now able to move the right extremity some as well.  Patient is determined to walk again.

## 2022-12-08 NOTE — ASSESSMENT & PLAN NOTE
Inflammation has decreased.  Patient received antibiotics.  There is thickening of the nail, worry about a fungal infection superimposed on recent bacterial paronychia.

## 2022-12-08 NOTE — ASSESSMENT & PLAN NOTE
Patient had a kay placed during one of her hospitalizations due to incontinence issues and wounds.  Patient has not had a trial of stopping the kay.  I would recommend next time the patient is due to a kay change, consider giving a trial without the kay now that she is likely able to void in a bedside commode.

## 2022-12-12 ENCOUNTER — LAB REQUISITION (OUTPATIENT)
Dept: LAB | Facility: HOSPITAL | Age: 67
End: 2022-12-12

## 2022-12-12 DIAGNOSIS — Z00.00 ENCOUNTER FOR GENERAL ADULT MEDICAL EXAMINATION WITHOUT ABNORMAL FINDINGS: ICD-10-CM

## 2022-12-12 LAB
BACTERIA UR QL AUTO: ABNORMAL /HPF
BILIRUB UR QL STRIP: NEGATIVE
CLARITY UR: ABNORMAL
COLOR UR: YELLOW
GLUCOSE UR STRIP-MCNC: NEGATIVE MG/DL
HGB UR QL STRIP.AUTO: ABNORMAL
HYALINE CASTS UR QL AUTO: ABNORMAL /LPF
KETONES UR QL STRIP: NEGATIVE
LEUKOCYTE ESTERASE UR QL STRIP.AUTO: ABNORMAL
NITRITE UR QL STRIP: POSITIVE
PH UR STRIP.AUTO: 6 [PH] (ref 5–8)
PROT UR QL STRIP: NEGATIVE
RBC # UR STRIP: ABNORMAL /HPF
REF LAB TEST METHOD: ABNORMAL
SP GR UR STRIP: 1.01 (ref 1–1.03)
SQUAMOUS #/AREA URNS HPF: ABNORMAL /HPF
UROBILINOGEN UR QL STRIP: ABNORMAL
WBC # UR STRIP: ABNORMAL /HPF

## 2022-12-12 PROCEDURE — 87077 CULTURE AEROBIC IDENTIFY: CPT

## 2022-12-12 PROCEDURE — 87086 URINE CULTURE/COLONY COUNT: CPT

## 2022-12-12 PROCEDURE — 87186 SC STD MICRODIL/AGAR DIL: CPT

## 2022-12-12 PROCEDURE — 81001 URINALYSIS AUTO W/SCOPE: CPT

## 2022-12-15 LAB — BACTERIA SPEC AEROBE CULT: ABNORMAL

## 2022-12-15 NOTE — PROGRESS NOTES
Dual Chamber Pacemaker Evaluation Report  Clinic Interrogation    December 15, 2022    Primary Cardiologist: David  : Medtronic Model: Advisa DR MRI  Implant date: 11/17/16    Reason for evaluation: routine  Indication for pacemaker: sick sinus syndrome    Measurements  Atrial sensing - P wave: 1.5 mV  Atrial threshold: 0.5 V@ 0.4 ms  Atrial lead impedance: 475 ohms  Ventricular sensing - R wave: 6.8 mV  Ventricular threshold: 0.625 V @ 0.4 ms  Ventricular lead impedance:   703 ohms     Manual sensing and threshold testing was performed.    Diagnostic Data  Atrial paced: 46.5 %  Ventricular paced: <0.1 %  Other: none  Battery status: satisfactory, 2.99 V      Final Parameters  Mode:  AAIR+  Lower rate: 60 bpm   Upper rate: 130 bpm  AV Delay: paced- 180 ms  Sensed-150 ms  Atrial - Amplitude: 1.5 V   Pulse width: 0.4 ms   Sensitivity: 0.3 mV     Ventricular - Amplitude: 0.625 V  Pulse width: 0.4 ms  Sensitivity: 0.9 mV    Changes made: none  Conclusions: normal pacemaker function, stable pacing and sensing thresholds and adequate battery reserve    Follow up: 6 months via [facility], annually in office

## 2022-12-30 DIAGNOSIS — S22.080A T12 COMPRESSION FRACTURE, INITIAL ENCOUNTER: Primary | ICD-10-CM

## 2023-01-05 ENCOUNTER — TELEPHONE (OUTPATIENT)
Dept: INTERNAL MEDICINE | Facility: CLINIC | Age: 68
End: 2023-01-05

## 2023-01-05 NOTE — TELEPHONE ENCOUNTER
NEEDING TO KNOW IF THE PATIENT NEED TO CONTINUE THE MEDICATION valACYclovir (VALTREX) 500 MG tablet    PLEASE CALL ABUNDIO   [phone]

## 2023-01-06 ENCOUNTER — HOME HEALTH ADMISSION (OUTPATIENT)
Dept: HOME HEALTH SERVICES | Facility: HOME HEALTHCARE | Age: 68
End: 2023-01-06
Payer: MEDICARE

## 2023-01-06 ENCOUNTER — TRANSCRIBE ORDERS (OUTPATIENT)
Dept: HOME HEALTH SERVICES | Facility: HOME HEALTHCARE | Age: 68
End: 2023-01-06
Payer: MEDICARE

## 2023-01-06 ENCOUNTER — TELEPHONE (OUTPATIENT)
Dept: INTERNAL MEDICINE | Facility: CLINIC | Age: 68
End: 2023-01-06
Payer: MEDICARE

## 2023-01-06 ENCOUNTER — TELEPHONE (OUTPATIENT)
Dept: INTERNAL MEDICINE | Facility: CLINIC | Age: 68
End: 2023-01-06

## 2023-01-06 DIAGNOSIS — Z46.6 ENCOUNTER FOR FITTING OR ADJUSTMENT OF URINARY DEVICE: ICD-10-CM

## 2023-01-06 DIAGNOSIS — M06.9 RHEUMATOID ARTHRITIS INVOLVING MULTIPLE JOINTS: ICD-10-CM

## 2023-01-06 NOTE — TELEPHONE ENCOUNTER
Caller: Tawny Shin    Relationship: Self    Best call back number: [phone]    What is the medical concern/diagnosis:  HOME HEALTH ORDERS WITH CATHETER     What specialty or service is being requested: UROLOGIST     What is the provider, practice or medical service name: DR MCCAULEY

## 2023-01-09 ENCOUNTER — HOME CARE VISIT (OUTPATIENT)
Dept: HOME HEALTH SERVICES | Facility: CLINIC | Age: 68
End: 2023-01-09
Payer: MEDICARE

## 2023-01-09 VITALS
RESPIRATION RATE: 18 BRPM | OXYGEN SATURATION: 98 % | HEART RATE: 72 BPM | TEMPERATURE: 98.6 F | SYSTOLIC BLOOD PRESSURE: 134 MMHG | DIASTOLIC BLOOD PRESSURE: 84 MMHG

## 2023-01-09 PROCEDURE — G0299 HHS/HOSPICE OF RN EA 15 MIN: HCPCS

## 2023-01-10 ENCOUNTER — HOME CARE VISIT (OUTPATIENT)
Dept: HOME HEALTH SERVICES | Facility: CLINIC | Age: 68
End: 2023-01-10
Payer: MEDICARE

## 2023-01-10 PROCEDURE — G0151 HHCP-SERV OF PT,EA 15 MIN: HCPCS

## 2023-01-10 NOTE — HOME HEALTH
Reason for Hosp/Primary Dx/Co-morbidities: 67 female dx RA . Pt reports hospitalized and rehab to home 1-5-23     Focus of Care: deconditioning, gait, transfers, safety    Current Functional status/mobility/DME:   See DME    HB status/Living Arrangements: lives with family 24/7     Skin Integrity/wound status: na     Code Status: FULL CODE    Fall Risk: high risk      PmHx:  R knee spacer ( 2 previous TKA ) L knee pain ( bone <> bone ) - CAD / MI ( stents ) B LE neuropathy - [place] ( B aides ) - C/L sx - anchored cath   PLOF : nonambulatory 2021     Infectious disease screen : Pt denies s/s or a exposure to anyone with  s/s of Covid -19      Skills :Education in ther ex to faciltate functional strength for transfers and mobility - reduce fall risk   Education in transfers with hand and AD placement for safety   Education in AD use to promote reduced fall risk - safe functional gait   Education on proper hand/foot placement, transitional movements, and safety awareness to decrease risk of falls     Pt requires VCs - TCs with teachback method requiring review and reitaration for pt and PSP to promote safe functional mobility  seated TE : hip flex - LAQ - ankle pumps x 10   sup TE : heel slides - SLR - bridges x 10     Pt good for goals per PLOF ,self motivated    and family support    Pt independent HEP , AD use and family assist to promote safe functional mobility by dc . Goals achieved

## 2023-01-11 VITALS
RESPIRATION RATE: 18 BRPM | HEART RATE: 67 BPM | TEMPERATURE: 97.1 F | OXYGEN SATURATION: 95 % | DIASTOLIC BLOOD PRESSURE: 68 MMHG | SYSTOLIC BLOOD PRESSURE: 124 MMHG

## 2023-01-11 NOTE — HOME HEALTH
SOC Note:    Home Health ordered for: disciplines SN, PT, and OT.    Reason for Hosp/Primary Dx/Co-morbidities: RA. Chronic kay cath, and stage 2 ulcer to right buttock.    Focus of Care: SN for pain, med assessment and teaching, uti teaching and assessment, kay  cath change and maintance. Wound assessment and teaching.     Current Functional status/mobility/DME: bed and chair bound. Pao lift transfer    HB status/Living Arrangements: 24 hour caregivers. Wheelchair bound.    Skin Integrity/wound status: Noted stage  2 pressure ulcer on right buttock    Code Status: Reports no CPR    Fall Risk: High    POC confirmed with Ruchi with Dr Reynoso     Plan for next visit: Assess wound right buttock, kay cath change due week of 1/23/2023

## 2023-01-11 NOTE — CASE COMMUNICATION
SOC Note:    Home Health ordered for: disciplines SN, PT, and OT.    Reason for Hosp/Primary Dx/Co-morbidities: RA. Chronic kay cath, and stage 2 ulcer to right buttock.    Focus of Care: SN for pain, med assessment and teaching, uti teaching and assessment, kay  cath change and maintance. Wound assessment and teaching.     Current Functional status/mobility/DME: bed and chair bound. Pao lift transfer    HB status/Living Arrangeme nts: 24 hour caregivers. Wheelchair bound.    Skin Integrity/wound status: Noted stage  2 pressure ulcer on right buttock    Code Status: Reports no CPR    Fall Risk: High    POC confirmed with Ruchi with Dr Reynoso     Plan for next visit: Assess wound right buttock, kay cath change due week of 1/23/2023

## 2023-01-13 ENCOUNTER — HOME CARE VISIT (OUTPATIENT)
Dept: HOME HEALTH SERVICES | Facility: CLINIC | Age: 68
End: 2023-01-13
Payer: MEDICARE

## 2023-01-13 VITALS
DIASTOLIC BLOOD PRESSURE: 72 MMHG | RESPIRATION RATE: 16 BRPM | SYSTOLIC BLOOD PRESSURE: 132 MMHG | TEMPERATURE: 98.3 F | HEART RATE: 74 BPM | OXYGEN SATURATION: 97 %

## 2023-01-13 VITALS
OXYGEN SATURATION: 96 % | SYSTOLIC BLOOD PRESSURE: 152 MMHG | HEART RATE: 80 BPM | TEMPERATURE: 98.3 F | RESPIRATION RATE: 14 BRPM | DIASTOLIC BLOOD PRESSURE: 80 MMHG

## 2023-01-13 PROCEDURE — G0157 HHC PT ASSISTANT EA 15: HCPCS

## 2023-01-13 PROCEDURE — G0299 HHS/HOSPICE OF RN EA 15 MIN: HCPCS

## 2023-01-13 NOTE — HOME HEALTH
FOCUS OF CARE/SKILLED NEED: Wound care, teaching/education medication, kay catheter care/change, fall safety/prevention, skin integrity     TEACHING/INTERVENTIONS: A&O X 4, VSS. Pt c/o moderate back pain. Pt reports she experiences this kind of back pain when up in power wheelchair for awhile. Wound care performed per MD order without difficulty. No s/s of infection noted.     PROGRESS TOWARD GOALS: Ongoing/progressing    PHYSICIAN CONTACT: No    INSURANCE CHANGES? No    FALLS SINCE LAST VISIT? No    MEDICATION CHANGES SINCE LAST VISIT? No     PRE-SCREENED FOR COVID? Yes, No s/s of COVID. No recent exposure.

## 2023-01-13 NOTE — HOME HEALTH
COVID SCREENING: no s/s  FOCUS OF CARE/SKILLED NEED: bed mob, transfers, ther ex  TEACHING/INTERVENTIONS: HEP  PROGRESS TOWARD GOALS: pt is progressing toward goals  PHYSICIAN CONTACT: n/a  INSURANCE CHANGES: no  FALLS SINCE LAST HH VISIT? no  MEDICATION CHANGES SINCE LAST HH VISIT? no  PLAN FOR NEXT VISIT: bed mob, transfers, ther ex, attempt pivot transfer with rwx    Pt has no new c/o today.

## 2023-01-17 ENCOUNTER — OFFICE VISIT (OUTPATIENT)
Dept: INTERNAL MEDICINE | Facility: CLINIC | Age: 68
End: 2023-01-17
Payer: MEDICARE

## 2023-01-17 VITALS
TEMPERATURE: 97.3 F | SYSTOLIC BLOOD PRESSURE: 132 MMHG | WEIGHT: 176 LBS | HEART RATE: 79 BPM | OXYGEN SATURATION: 98 % | DIASTOLIC BLOOD PRESSURE: 70 MMHG | HEIGHT: 66 IN | BODY MASS INDEX: 28.28 KG/M2

## 2023-01-17 DIAGNOSIS — F44.4 FUNCTIONAL NEUROLOGICAL SYMPTOM DISORDER WITH WEAKNESS OR PARALYSIS: Chronic | ICD-10-CM

## 2023-01-17 DIAGNOSIS — Z79.01 CHRONIC ANTICOAGULATION: ICD-10-CM

## 2023-01-17 DIAGNOSIS — I87.2 VENOUS INSUFFICIENCY: Chronic | ICD-10-CM

## 2023-01-17 DIAGNOSIS — I10 ESSENTIAL HYPERTENSION: Chronic | ICD-10-CM

## 2023-01-17 DIAGNOSIS — Z97.8 CHRONIC INDWELLING FOLEY CATHETER: Primary | Chronic | ICD-10-CM

## 2023-01-17 DIAGNOSIS — L89.319 PRESSURE INJURY OF SKIN OF RIGHT BUTTOCK, UNSPECIFIED INJURY STAGE: Chronic | ICD-10-CM

## 2023-01-17 PROBLEM — L89.309 PRESSURE INJURY OF SKIN OF BUTTOCK: Chronic | Status: ACTIVE | Noted: 2023-01-17

## 2023-01-17 PROBLEM — N39.0 URINARY TRACT INFECTION WITHOUT HEMATURIA: Status: RESOLVED | Noted: 2021-10-21 | Resolved: 2023-01-17

## 2023-01-17 PROBLEM — R78.81 BACTEREMIA: Status: RESOLVED | Noted: 2021-10-22 | Resolved: 2023-01-17

## 2023-01-17 PROBLEM — R74.01 TRANSAMINITIS: Status: RESOLVED | Noted: 2021-10-21 | Resolved: 2023-01-17

## 2023-01-17 PROBLEM — L02.91 ABSCESS: Chronic | Status: RESOLVED | Noted: 2022-06-04 | Resolved: 2023-01-17

## 2023-01-17 PROBLEM — E87.6 HYPOKALEMIA: Status: RESOLVED | Noted: 2021-10-21 | Resolved: 2023-01-17

## 2023-01-17 PROCEDURE — 99213 OFFICE O/P EST LOW 20 MIN: CPT | Performed by: INTERNAL MEDICINE

## 2023-01-18 ENCOUNTER — HOME CARE VISIT (OUTPATIENT)
Dept: HOME HEALTH SERVICES | Facility: CLINIC | Age: 68
End: 2023-01-18
Payer: MEDICARE

## 2023-01-18 ENCOUNTER — LAB REQUISITION (OUTPATIENT)
Dept: LAB | Facility: HOSPITAL | Age: 68
End: 2023-01-18
Payer: MEDICARE

## 2023-01-18 VITALS
SYSTOLIC BLOOD PRESSURE: 196 MMHG | OXYGEN SATURATION: 95 % | RESPIRATION RATE: 20 BRPM | HEART RATE: 67 BPM | TEMPERATURE: 97.5 F | DIASTOLIC BLOOD PRESSURE: 78 MMHG

## 2023-01-18 VITALS — SYSTOLIC BLOOD PRESSURE: 128 MMHG | DIASTOLIC BLOOD PRESSURE: 64 MMHG

## 2023-01-18 DIAGNOSIS — Z00.00 ENCOUNTER FOR GENERAL ADULT MEDICAL EXAMINATION WITHOUT ABNORMAL FINDINGS: ICD-10-CM

## 2023-01-18 LAB
BACTERIA UR QL AUTO: ABNORMAL /HPF
BILIRUB UR QL STRIP: NEGATIVE
CLARITY UR: CLEAR
COLOR UR: YELLOW
GLUCOSE UR STRIP-MCNC: NEGATIVE MG/DL
HGB UR QL STRIP.AUTO: NEGATIVE
HYALINE CASTS UR QL AUTO: ABNORMAL /LPF
KETONES UR QL STRIP: NEGATIVE
LEUKOCYTE ESTERASE UR QL STRIP.AUTO: ABNORMAL
NITRITE UR QL STRIP: NEGATIVE
PH UR STRIP.AUTO: 5.5 [PH] (ref 5–8)
PROT UR QL STRIP: NEGATIVE
RBC # UR STRIP: ABNORMAL /HPF
REF LAB TEST METHOD: ABNORMAL
SP GR UR STRIP: 1.01 (ref 1–1.03)
SQUAMOUS #/AREA URNS HPF: ABNORMAL /HPF
UROBILINOGEN UR QL STRIP: ABNORMAL
WBC # UR STRIP: ABNORMAL /HPF
YEAST URNS QL MICRO: ABNORMAL /HPF

## 2023-01-18 PROCEDURE — G0152 HHCP-SERV OF OT,EA 15 MIN: HCPCS

## 2023-01-18 PROCEDURE — G0157 HHC PT ASSISTANT EA 15: HCPCS

## 2023-01-18 PROCEDURE — 87086 URINE CULTURE/COLONY COUNT: CPT | Performed by: INTERNAL MEDICINE

## 2023-01-18 PROCEDURE — G0299 HHS/HOSPICE OF RN EA 15 MIN: HCPCS

## 2023-01-18 PROCEDURE — 81001 URINALYSIS AUTO W/SCOPE: CPT | Performed by: INTERNAL MEDICINE

## 2023-01-18 NOTE — PROGRESS NOTES
"      Chief Complaint  Follow-up (D/c from [facility] ), right ankle stays red and irrated, Hypertension (BP has been going up and down pulse has been steady ), cath (Pt wants you to sign orders for cath change for home health and not send her to urology ), and bed sore (FYI: is doing better home health is keeping an eye on it )    Subjective        Tawny Shin presents to [facility]    HPI    Patient seen for above problems.  See assessment and plan for some of the HPI components regarding current issues.    Patient recently released from SNF.  Patient has HH.  Patient still non ambulatory.  Can bear weight but still not able to do transfers or ambulate.  Patient in chair moveo f the day.  Working with PT and OT.     Patient has continued kay and needs HH to change it.  No fever or chills.  Would like to eventually get catheter out.    Wound on buttock has improved with HH.    Review of Systems   Constitutional: Negative for chills and fever.   Respiratory: Negative for cough and shortness of breath.    Cardiovascular: Negative for chest pain and palpitations.   Gastrointestinal: Negative for abdominal distention, abdominal pain, diarrhea, nausea and vomiting.       Objective   Vital Signs:  /70 (BP Location: Left arm, Patient Position: Sitting, Cuff Size: Adult)   Pulse 79   Temp 97.3 °F (36.3 °C) (Temporal)   Ht 167.6 cm (66\")   Wt 79.8 kg (176 lb)   SpO2 98%   BMI 28.41 kg/m²   Estimated body mass index is 28.41 kg/m² as calculated from the following:    Height as of this encounter: 167.6 cm (66\").    Weight as of this encounter: 79.8 kg (176 lb).      Physical Exam  Vitals reviewed.   HENT:      Head: Normocephalic and atraumatic.      Nose: No congestion.   Cardiovascular:      Rate and Rhythm: Normal rate and regular rhythm.   Pulmonary:      Effort: Pulmonary effort is normal.      Breath sounds: Normal breath sounds.   Genitourinary:     Comments: " kay  Musculoskeletal:      Right lower leg: Edema (trace) present.      Left lower leg: Edema (trace) present.   Skin:     General: Skin is warm and dry.   Neurological:      General: No focal deficit present.      Mental Status: She is alert and oriented to person, place, and time.                         Assessment and Plan   Diagnoses and all orders for this visit:    1. Chronic indwelling Kay catheter (Primary)  Assessment & Plan:  From review of the chart, it seems as though kay was likely placed to aid in allowing wounds to heal as well as due to her nonambulatory status.  I would really like to work on getting this out as her wounds are going better.  I would like the patient to get some what acclimated at home prior to make the change.  Would like her to go see Nino Day or Carey Gilbert at urology clinic to do a voiding trial at some point.    Orders:  -     Cancel: Ambulatory Referral to Urology  -     Ambulatory Referral to Home Health    2. Venous insufficiency  Assessment & Plan:  Legs are better than they have been in a while when I have seen her.  The patient has trace edema and that's it at present.      3. Functional neurological symptom disorder with weakness or paralysis  Assessment & Plan:  Left lower extremity improving significantly.  Right lower extremity still significantly weak.  Patient still nonambulatory.  Love that the patient is motivated to try to get to where she can ambulate again.  Unsure that she will be able to accomplish it, but she is willing to put forth the effort.  We will provide any assistance that we can to help her achieve her goals.      4. Essential hypertension  Assessment & Plan:  Hypertension is improving with treatment.  Continue current medications.  Blood pressure will be reassessed at the next regular appointment.    Patients BP has been fluctuating. Asymptomatic. Stable.  Requested home health get me some numbers to monitor.  Do not want to make any  changes at the present time.      5. Chronic anticoagulation  Assessment & Plan:  Tolerating anticoagulation. No signs of bleeding.      6. Pressure injury of skin of right buttock, unspecified injury stage  Assessment & Plan:  Home health managing.  Improving significantly.              Result Review :                      Follow Up   Return in about 3 months (around 4/17/2023), or if symptoms worsen or fail to improve, for Recheck, Annual physical.  Patient was given instructions and counseling regarding her condition or for health maintenance advice. Please see specific information pulled into the AVS if appropriate.       PHILL Oliveira MD, FACP, [facility]      Electronically signed by Moises Oliveira MD, 01/17/23, 8:17 PM CST.

## 2023-01-18 NOTE — ASSESSMENT & PLAN NOTE
Legs are better than they have been in a while when I have seen her.  The patient has trace edema and that's it at present.

## 2023-01-18 NOTE — ASSESSMENT & PLAN NOTE
Left lower extremity improving significantly.  Right lower extremity still significantly weak.  Patient still nonambulatory.  Love that the patient is motivated to try to get to where she can ambulate again.  Unsure that she will be able to accomplish it, but she is willing to put forth the effort.  We will provide any assistance that we can to help her achieve her goals.

## 2023-01-18 NOTE — ASSESSMENT & PLAN NOTE
Hypertension is improving with treatment.  Continue current medications.  Blood pressure will be reassessed at the next regular appointment.    Patients BP has been fluctuating. Asymptomatic. Stable.  Requested home health get me some numbers to monitor.  Do not want to make any changes at the present time.

## 2023-01-18 NOTE — HOME HEALTH
COVID SCREENING: no s/s  FOCUS OF CARE/SKILLED NEED: bed mob, transfers, ther ex  TEACHING/INTERVENTIONS: transfers, ther ex  PROGRESS TOWARD GOALS: pt is progressing toward goals  PHYSICIAN CONTACT: Dr Oliveira's office notified of pt's elevated BP.  INSURANCE CHANGES: no  FALLS SINCE LAST HH VISIT? no  MEDICATION CHANGES SINCE LAST HH VISIT? no  PLAN FOR NEXT VISIT: transfers, ther ex    Pt has no new c/o today. Skye Verdin OT and Deepika Adrian RN were also notified of pt's elevated BP.

## 2023-01-18 NOTE — ASSESSMENT & PLAN NOTE
From review of the chart, it seems as though kay was likely placed to aid in allowing wounds to heal as well as due to her nonambulatory status.  I would really like to work on getting this out as her wounds are going better.  I would like the patient to get some what acclimated at home prior to make the change.  Would like her to go see Nino Day or Carey Gilbert at urology clinic to do a voiding trial at some point.

## 2023-01-19 VITALS
TEMPERATURE: 98.7 F | OXYGEN SATURATION: 98 % | DIASTOLIC BLOOD PRESSURE: 68 MMHG | RESPIRATION RATE: 18 BRPM | SYSTOLIC BLOOD PRESSURE: 110 MMHG | HEART RATE: 66 BPM

## 2023-01-19 LAB — BACTERIA SPEC AEROBE CULT: NORMAL

## 2023-01-19 NOTE — HOME HEALTH
Routine Visit Note:  Urinary Catheter Change, Skin assessment.    Skill/education provided: Worked with coworker, checked off on Javon changing kay catheter with 100% accuracy.    Patient/caregiver response: Using johan lift with caregiver in the home, transferred patient to the bed from the wheelchair without difficulty.  Patient had a scab on bilaterally ankles, cleaned with saline and applied cream open to air.    Patient reports that her urine has odor to it - during urinary catheter change obtained sterile urine specimen. Delivered to [facility] and notified Clinical manager Darell.

## 2023-01-20 ENCOUNTER — HOME CARE VISIT (OUTPATIENT)
Dept: HOME HEALTH SERVICES | Facility: CLINIC | Age: 68
End: 2023-01-20
Payer: MEDICARE

## 2023-01-20 VITALS
SYSTOLIC BLOOD PRESSURE: 126 MMHG | HEART RATE: 67 BPM | RESPIRATION RATE: 20 BRPM | TEMPERATURE: 97.4 F | DIASTOLIC BLOOD PRESSURE: 70 MMHG | OXYGEN SATURATION: 95 %

## 2023-01-20 PROCEDURE — G0157 HHC PT ASSISTANT EA 15: HCPCS

## 2023-01-20 NOTE — HOME HEALTH
COVID SCREENING: no s/s  FOCUS OF CARE/SKILLED NEED: bed mob, transfers, ther ex  TEACHING/INTERVENTIONS: transfers, ther ex/stretching/positioning  PROGRESS TOWARD GOALS: pt is progressing toward goals  PHYSICIAN CONTACT: n/a  INSURANCE CHANGES: no  FALLS SINCE LAST HH VISIT? no  MEDICATION CHANGES SINCE LAST HH VISIT? no  PLAN FOR NEXT VISIT: bed mob, transfers, ther ex    Pt has no new c/o today.

## 2023-01-21 NOTE — HOME HEALTH
FOCUS OF CARE/SKILLED NEED:Indwelling catheter change     TEACHING/INTERVENTIONS: Catheter care    PROGRESS TOWARD GOALS:Ongoing and progressing     PHYSICIAN CONTACT:  No    INSURANCE CHANGES? No     FALLS SINCE LAST VISIT? None     MEDICATION CHANGES?No     PLAN FOR NEXT VISIT:Catheter care management and teaching

## 2023-01-23 PROCEDURE — G0180 MD CERTIFICATION HHA PATIENT: HCPCS | Performed by: INTERNAL MEDICINE

## 2023-01-23 NOTE — HOME HEALTH
OCCUPATIONAL THERAPY EVALUATION    REASON FOR REFERRAL-   Patient referred due to recent hospital and SNF stay.  PRIMARY DIAGNOSIS-   rheumatoid arthritis  SECONDARY DIAGNOSIS-  pressure ulcer, chronic pain, HTN  SURGICAL PROCEDURES-  none recently    PREVIOUS OCCUPATIONAL THERAPY- yes  OXYGEN USE- n/a    CLINICAL FINDINGS:  WOUND / SKIN CONDITION-  pressure ulcer R buttock    EDEMA-  sligh B ankles     EQUIPMENT : See DME                     DRIVING- no longer driving          FUNCTIONAL ENDURANCE FOR SAFE ACTIVITIES OF DAILY LIVING / INSTRUMENTAL ACTIVITIES OF DAILY LIVING:  fair      WEIGHT BEARING STATUS/PRECAUTIONS- non ambulatory since 2021    ASSISTIVE DEVICE-  power wheelchair, mechanical lift    ACTIVITIES OF DAILY LIVING MOBILITY/FALLS RISK ASSESSMENT- at risk for falls due to weakness, impaired gait and balance, dependence on AD    PATIENT GOAL FOR THIS EPISODE OF CARE-  to be able to transfer into shower.    TODAY'S INTERVENTIONS-   Initial eval completed. Goals and POC discussed with patient and caregiver and ADL training initiated. Educated on possibility of adapting shower/removing door and placing shower curtain and tub bench for patient to access shower  with mechanical lift. Patient reports she will think about it and consider the possible option of remodeling it as well.    REHAB POTENTIAL-   good for stated goals    DATE OF NEXT APPOINTMENT WITH DOCTOR- pending   PATIENT / CAREGIVER AGREE WITH DISCHARGE PLAN- yes  COMMUNICATION / CARE COORDINATION- staff re: OT cristofer

## 2023-01-24 ENCOUNTER — HOME CARE VISIT (OUTPATIENT)
Dept: HOME HEALTH SERVICES | Facility: CLINIC | Age: 68
End: 2023-01-24
Payer: MEDICARE

## 2023-01-24 PROCEDURE — G0152 HHCP-SERV OF OT,EA 15 MIN: HCPCS

## 2023-01-25 ENCOUNTER — TELEPHONE (OUTPATIENT)
Dept: INTERNAL MEDICINE | Facility: CLINIC | Age: 68
End: 2023-01-25

## 2023-01-25 ENCOUNTER — HOME CARE VISIT (OUTPATIENT)
Dept: HOME HEALTH SERVICES | Facility: CLINIC | Age: 68
End: 2023-01-25
Payer: MEDICARE

## 2023-01-25 VITALS
DIASTOLIC BLOOD PRESSURE: 70 MMHG | TEMPERATURE: 98 F | RESPIRATION RATE: 18 BRPM | SYSTOLIC BLOOD PRESSURE: 142 MMHG | HEART RATE: 69 BPM | OXYGEN SATURATION: 96 %

## 2023-01-25 VITALS
OXYGEN SATURATION: 96 % | TEMPERATURE: 97.5 F | SYSTOLIC BLOOD PRESSURE: 178 MMHG | DIASTOLIC BLOOD PRESSURE: 84 MMHG | RESPIRATION RATE: 20 BRPM | HEART RATE: 67 BPM

## 2023-01-25 PROCEDURE — G0157 HHC PT ASSISTANT EA 15: HCPCS

## 2023-01-25 NOTE — Clinical Note
Pt is making good progress, but is not ready for d/c yet. She would benefit from continuing home visits for a few more weeks.

## 2023-01-25 NOTE — HOME HEALTH
COVID SCREENING: no s/s  FOCUS OF CARE/SKILLED NEED: transfers, ther ex  TEACHING/INTERVENTIONS: transfers, positioning for stretching  PROGRESS TOWARD GOALS: pt is progressing toward goals  PHYSICIAN CONTACT: Dr Oliveira's office notified of pt's elevated BP.  INSURANCE CHANGES: no  FALLS SINCE LAST HH VISIT? no  MEDICATION CHANGES SINCE LAST HH VISIT? no  PLAN FOR NEXT VISIT: transfers, ther ex    Pt has no new c/o today.

## 2023-01-25 NOTE — TELEPHONE ENCOUNTER
Dr. Oliveira reviewed and says he is not concerned about high readings prior to medication, would only be concerned with high readings after medication had sufficient time to be in her system.  Called Pete and informed and he voiced understanding and will encourage patient to check BP with home monitor, a sufficient time after her medication dose(s), to make sure not getting high readings.

## 2023-01-25 NOTE — HOME HEALTH
Covid screen completed    Subjective: Patient states she is feeling well today with low pain.    Falls:none    Medication changes:none    Insurance changes:none    Edema:none    Medical necessity for continued visits: Skilled OT medically necessary to address UB HEP and shower safety    Next MD appt: Dr. Oliveira -3 months    Plan for next visit: Plan to address UB HEP and safety education.

## 2023-01-25 NOTE — TELEPHONE ENCOUNTER
Caller: FREDO    Relationship: [facility]    Best call back number: [phone]        Who are you requesting to speak with (clinical staff, provider,  specific staff member): CLINICAL STAFF    Do you know the name of the person who called: FREDO WITH [facility]    What was the call regarding: [facility] REPORTED THAT PATIENT'S BLOOD PRESSURE LAST WEEK /84 AT REST; PATIENT WAS GIVEN HER MEDICATIONS AND AT 15 MINUTES HER BLOOD PRESSURE /80. TODAY IS /90 AS SHE AGAIN HADN'T HER MEDICATION.       Do you require a callback: I DON'T KNOW

## 2023-01-26 ENCOUNTER — HOME CARE VISIT (OUTPATIENT)
Dept: HOME HEALTH SERVICES | Facility: CLINIC | Age: 68
End: 2023-01-26
Payer: MEDICARE

## 2023-01-26 VITALS — TEMPERATURE: 98.2 F | SYSTOLIC BLOOD PRESSURE: 142 MMHG | DIASTOLIC BLOOD PRESSURE: 84 MMHG | HEART RATE: 76 BPM

## 2023-01-26 PROCEDURE — G0299 HHS/HOSPICE OF RN EA 15 MIN: HCPCS

## 2023-01-27 ENCOUNTER — HOME CARE VISIT (OUTPATIENT)
Dept: HOME HEALTH SERVICES | Facility: CLINIC | Age: 68
End: 2023-01-27
Payer: MEDICARE

## 2023-01-27 PROCEDURE — G0151 HHCP-SERV OF PT,EA 15 MIN: HCPCS

## 2023-01-27 NOTE — HOME HEALTH
COVID SCREENING:No s/s of covid 19    FOCUS OF CARE/SKILLED NEED: cp, wd assessment and care, kay maintenance    TEACHING/INTERVENTIONS: Instructed to stay off of lesion on right  buttocks when poss and leave brief open when on side. Out of Butt Cream. Sitter to pickup more. Complained of constipation. Instructed to take mMiralax as ordered. Assisted sitter in prep of Miralax.    PATIENT/CG RESPONSE:Cooperative. Laughs easily.     PROGRESS TOWARD GOALS:Slow healing lesion on right buttock.    PHYSICIAN CONTACT: none    FALLS SINCE LAST VISIT?none    MEDICATION CHANGES SINCE LAST VISIT?no    PLAN FOR NEXT VISIT:Wd assessment and care. GI assessment due to constipation today

## 2023-01-27 NOTE — HOME HEALTH
COVID SCREENING:No s/s of covid 19    FOCUS OF CARE/SKILLED NEED: cp, wd assessment and care, kay maintenance    TEACHING/INTERVENTIONS: Instructed to stay off of lesion on right  buttocks when poss and leave brief open when on side. Out of Butt Cream. Sitter to pickup more. Complained of constipation. Instructed to take mMiralax as ordered. Assisted sitter in prep of Miralax.    PATIENT/CG RESPONSE:Cooperative. Laughs easily.     PRO ANDRZEJ TOWARD GOALS:Slow healing lesion on right buttock.    PHYSICIAN CONTACT: none    FALLS SINCE LAST VISIT?none    MEDICATION CHANGES SINCE LAST VISIT?no    PLAN FOR NEXT VISIT:Wd assessment and care. GI assessment due to constipation today

## 2023-01-30 VITALS
OXYGEN SATURATION: 94 % | SYSTOLIC BLOOD PRESSURE: 138 MMHG | TEMPERATURE: 97.1 F | HEART RATE: 65 BPM | RESPIRATION RATE: 65 BRPM | DIASTOLIC BLOOD PRESSURE: 82 MMHG

## 2023-01-30 NOTE — HOME HEALTH
pt/psp report improving but request cont tx to promote strength and safety       Reason for Hosp/Primary Dx/Co-morbidities: 67 female dx RA . Pt reports hospitalized and rehab to home 1-5-23   Focus of Care: deconditioning, gait, transfers, safety   Current Functional status/mobility/DME:   See DME   HB status/Living Arrangements: lives with family 24/7   Skin Integrity/wound status: na   Code Status: FULL CODE   Fall Risk: high risk   PmHx: R knee spacer ( 2 previous TKA ) L knee pain ( bone <> bone ) - CAD / MI ( stents ) B LE neuropathy - [place] ( B aides ) - C/L sx - anchored cath   PLOF : nonambulatory 2021   Infectious disease screen : Pt denies s/s or a exposure to anyone with s/s of Covid -19   Skills :Education in ther ex to faciltate functional strength for transfers and mobility - reduce fall risk   Education in transfers with hand and AD placement for safety   Education in AD use to promote reduced fall risk - safe functional gait   Education on proper hand/foot placement, transitional movements, and safety awareness to decrease risk of falls     Pt independent HEP , AD use and family assist to promote safe functional mobility by dc . Goals achieved

## 2023-01-31 ENCOUNTER — HOME CARE VISIT (OUTPATIENT)
Dept: HOME HEALTH SERVICES | Facility: CLINIC | Age: 68
End: 2023-01-31
Payer: MEDICARE

## 2023-01-31 PROCEDURE — G0151 HHCP-SERV OF PT,EA 15 MIN: HCPCS

## 2023-02-01 VITALS
OXYGEN SATURATION: 98 % | TEMPERATURE: 97.5 F | SYSTOLIC BLOOD PRESSURE: 142 MMHG | RESPIRATION RATE: 18 BRPM | HEART RATE: 91 BPM | DIASTOLIC BLOOD PRESSURE: 72 MMHG

## 2023-02-01 NOTE — HOME HEALTH
pt reports ok this a.m       Reason for Hosp/Primary Dx/Co-morbidities: 67 female dx RA . Pt reports hospitalized and rehab to home 1-5-23   Focus of Care: deconditioning, gait, transfers, safety   Current Functional status/mobility/DME:   See DME   HB status/Living Arrangements: lives with family 24/7   Skin Integrity/wound status: na   Code Status: FULL CODE   Fall Risk: high risk   PmHx: R knee spacer ( 2 previous TKA ) L knee pain ( bone <> bone ) - CAD / MI ( stents ) B LE neuropathy - [place] ( B aides ) - C/L sx - anchored cath   PLOF : nonambulatory 2021   Infectious disease screen : Pt denies s/s or a exposure to anyone with s/s of Covid -19   Skills :Education in ther ex to faciltate functional strength for transfers and mobility - reduce fall risk   Education in transfers with hand and AD placement for safety   Education in AD use to promote reduced fall risk - safe functional gait   Education on proper hand/foot placement, transitional movements, and safety awareness to decrease risk of falls

## 2023-02-02 ENCOUNTER — HOME CARE VISIT (OUTPATIENT)
Dept: HOME HEALTH SERVICES | Facility: CLINIC | Age: 68
End: 2023-02-02
Payer: MEDICARE

## 2023-02-02 VITALS
DIASTOLIC BLOOD PRESSURE: 82 MMHG | TEMPERATURE: 96.9 F | HEART RATE: 83 BPM | OXYGEN SATURATION: 93 % | SYSTOLIC BLOOD PRESSURE: 140 MMHG | RESPIRATION RATE: 18 BRPM

## 2023-02-02 PROCEDURE — G0151 HHCP-SERV OF PT,EA 15 MIN: HCPCS

## 2023-02-02 NOTE — HOME HEALTH
pt reports HA but overall good       Reason for Hosp/Primary Dx/Co-morbidities: 67 female dx RA . Pt reports hospitalized and rehab to home 1-5-23   Focus of Care: deconditioning, gait, transfers, safety   Current Functional status/mobility/DME:   See DME   HB status/Living Arrangements: lives with family 24/7   Skin Integrity/wound status: na   Code Status: FULL CODE   Fall Risk: high risk   PmHx: R knee spacer ( 2 previous TKA ) L knee pain ( bone <> bone ) - CAD / MI ( stents ) B LE neuropathy - [place] ( B aides ) - C/L sx - anchored cath   PLOF : nonambulatory 2021   Infectious disease screen : Pt denies s/s or a exposure to anyone with s/s of Covid -19   Skills :Education in ther ex to faciltate functional strength for transfers and mobility - reduce fall risk   Education in transfers with hand and AD placement for safety   Education in AD use to promote reduced fall risk - safe functional gait   Education on proper hand/foot placement, transitional movements, and safety awareness to decrease risk of falls

## 2023-02-03 ENCOUNTER — HOME CARE VISIT (OUTPATIENT)
Dept: HOME HEALTH SERVICES | Facility: CLINIC | Age: 68
End: 2023-02-03
Payer: MEDICARE

## 2023-02-03 ENCOUNTER — TELEPHONE (OUTPATIENT)
Dept: INTERNAL MEDICINE | Facility: CLINIC | Age: 68
End: 2023-02-03

## 2023-02-03 DIAGNOSIS — I10 ESSENTIAL HYPERTENSION: ICD-10-CM

## 2023-02-03 PROCEDURE — G0300 HHS/HOSPICE OF LPN EA 15 MIN: HCPCS

## 2023-02-03 RX ORDER — FERROUS SULFATE TAB EC 324 MG (65 MG FE EQUIVALENT) 324 (65 FE) MG
324 TABLET DELAYED RESPONSE ORAL
Qty: 90 TABLET | Refills: 3 | Status: SHIPPED | OUTPATIENT
Start: 2023-02-03 | End: 2023-02-06

## 2023-02-03 RX ORDER — LEFLUNOMIDE 20 MG/1
20 TABLET ORAL
Qty: 90 TABLET | Refills: 3 | Status: SHIPPED | OUTPATIENT
Start: 2023-02-03 | End: 2023-02-06

## 2023-02-03 RX ORDER — LEVOTHYROXINE SODIUM 0.07 MG/1
75 TABLET ORAL DAILY
Qty: 90 TABLET | Refills: 3 | Status: SHIPPED | OUTPATIENT
Start: 2023-02-03 | End: 2023-02-06

## 2023-02-03 RX ORDER — PREDNISONE 1 MG/1
5 TABLET ORAL DAILY
Qty: 90 TABLET | Refills: 3 | Status: SHIPPED | OUTPATIENT
Start: 2023-02-03 | End: 2023-02-06

## 2023-02-03 RX ORDER — CEPHALEXIN 500 MG/1
500 CAPSULE ORAL 3 TIMES DAILY
Qty: 270 CAPSULE | Refills: 3 | Status: SHIPPED | OUTPATIENT
Start: 2023-02-03 | End: 2023-02-06 | Stop reason: SDUPTHER

## 2023-02-03 RX ORDER — ISOSORBIDE MONONITRATE 60 MG/1
60 TABLET, EXTENDED RELEASE ORAL EVERY MORNING
Qty: 90 TABLET | Refills: 3 | Status: SHIPPED | OUTPATIENT
Start: 2023-02-03 | End: 2023-02-06

## 2023-02-03 RX ORDER — FOLIC ACID 1 MG/1
1 TABLET ORAL DAILY
Qty: 90 TABLET | Refills: 3 | Status: SHIPPED | OUTPATIENT
Start: 2023-02-03 | End: 2023-02-06

## 2023-02-03 RX ORDER — FUROSEMIDE 40 MG/1
40 TABLET ORAL DAILY
Qty: 90 TABLET | Refills: 3 | Status: SHIPPED | OUTPATIENT
Start: 2023-02-03 | End: 2023-02-06

## 2023-02-03 RX ORDER — SALSALATE 500 MG
500 TABLET ORAL DAILY
Qty: 384 TABLET | Refills: 3 | Status: SHIPPED | OUTPATIENT
Start: 2023-02-03 | End: 2023-02-03 | Stop reason: SDUPTHER

## 2023-02-03 RX ORDER — SUCRALFATE 1 G/1
1 TABLET ORAL
Qty: 180 TABLET | Refills: 3 | Status: SHIPPED | OUTPATIENT
Start: 2023-02-03 | End: 2023-02-06

## 2023-02-03 RX ORDER — FAMOTIDINE 40 MG/1
40 TABLET, FILM COATED ORAL DAILY
Qty: 90 TABLET | Refills: 3 | Status: SHIPPED | OUTPATIENT
Start: 2023-02-03 | End: 2023-02-06

## 2023-02-03 RX ORDER — SALSALATE 500 MG
500 TABLET ORAL DAILY
Qty: 384 TABLET | Refills: 3 | Status: SHIPPED | OUTPATIENT
Start: 2023-02-03 | End: 2023-02-06 | Stop reason: SDUPTHER

## 2023-02-03 RX ORDER — DULOXETIN HYDROCHLORIDE 60 MG/1
60 CAPSULE, DELAYED RELEASE ORAL DAILY
Qty: 90 CAPSULE | Refills: 3 | Status: SHIPPED | OUTPATIENT
Start: 2023-02-03 | End: 2023-02-06

## 2023-02-03 RX ORDER — VALACYCLOVIR HYDROCHLORIDE 500 MG/1
500 TABLET, FILM COATED ORAL DAILY
Qty: 90 TABLET | Refills: 3 | Status: SHIPPED | OUTPATIENT
Start: 2023-02-03 | End: 2023-02-06

## 2023-02-03 RX ORDER — CARVEDILOL 25 MG/1
25 TABLET ORAL 2 TIMES DAILY WITH MEALS
Qty: 180 TABLET | Refills: 3 | Status: SHIPPED | OUTPATIENT
Start: 2023-02-03 | End: 2023-02-06

## 2023-02-03 NOTE — TELEPHONE ENCOUNTER
Caller: Tawny Shin    Relationship to patient: Self    Best call back number: [phone]    Patient is needing: TO FOLLOW UP WITH FAX SENT TO THE OFFICE FROM PHARMACY REGARDING PATIENTS MEDICATION REFILLS. STATES THERE'S ABOUT 18 MEDICATIONS NEEDED TO REFILL. OF THOSE MEDICATIONS, IS BLOOD PRESSURE, RUNS HIGH, AND HAS ONE MORE FOR TOMORROW.

## 2023-02-06 VITALS
DIASTOLIC BLOOD PRESSURE: 80 MMHG | OXYGEN SATURATION: 95 % | SYSTOLIC BLOOD PRESSURE: 138 MMHG | HEART RATE: 69 BPM | TEMPERATURE: 97.7 F | RESPIRATION RATE: 18 BRPM

## 2023-02-06 RX ORDER — PREDNISONE 1 MG/1
5 TABLET ORAL DAILY
Qty: 90 TABLET | Refills: 3 | Status: SHIPPED | OUTPATIENT
Start: 2023-02-06

## 2023-02-06 RX ORDER — VALACYCLOVIR HYDROCHLORIDE 500 MG/1
500 TABLET, FILM COATED ORAL DAILY
Qty: 90 TABLET | Refills: 3 | Status: SHIPPED | OUTPATIENT
Start: 2023-02-06

## 2023-02-06 RX ORDER — LEVOTHYROXINE SODIUM 0.07 MG/1
75 TABLET ORAL DAILY
Qty: 90 TABLET | Refills: 3 | Status: SHIPPED | OUTPATIENT
Start: 2023-02-06

## 2023-02-06 RX ORDER — FERROUS SULFATE TAB EC 324 MG (65 MG FE EQUIVALENT) 324 (65 FE) MG
324 TABLET DELAYED RESPONSE ORAL
Qty: 90 TABLET | Refills: 3 | Status: SHIPPED | OUTPATIENT
Start: 2023-02-06

## 2023-02-06 RX ORDER — CARVEDILOL 25 MG/1
25 TABLET ORAL 2 TIMES DAILY WITH MEALS
Qty: 180 TABLET | Refills: 3 | Status: SHIPPED | OUTPATIENT
Start: 2023-02-06

## 2023-02-06 RX ORDER — DULOXETIN HYDROCHLORIDE 60 MG/1
60 CAPSULE, DELAYED RELEASE ORAL DAILY
Qty: 90 CAPSULE | Refills: 3 | Status: SHIPPED | OUTPATIENT
Start: 2023-02-06

## 2023-02-06 RX ORDER — FUROSEMIDE 40 MG/1
40 TABLET ORAL DAILY
Qty: 90 TABLET | Refills: 3 | Status: SHIPPED | OUTPATIENT
Start: 2023-02-06

## 2023-02-06 RX ORDER — CEPHALEXIN 500 MG/1
1000 CAPSULE ORAL 2 TIMES DAILY
Qty: 120 CAPSULE | Refills: 3
Start: 2023-02-06

## 2023-02-06 RX ORDER — SUCRALFATE 1 G/1
1 TABLET ORAL
Qty: 360 TABLET | Refills: 3 | Status: SHIPPED | OUTPATIENT
Start: 2023-02-06

## 2023-02-06 RX ORDER — FOLIC ACID 1 MG/1
1 TABLET ORAL DAILY
Qty: 90 TABLET | Refills: 3 | Status: SHIPPED | OUTPATIENT
Start: 2023-02-06

## 2023-02-06 RX ORDER — LEFLUNOMIDE 20 MG/1
20 TABLET ORAL
Qty: 90 TABLET | Refills: 3 | Status: SHIPPED | OUTPATIENT
Start: 2023-02-06

## 2023-02-06 RX ORDER — ISOSORBIDE MONONITRATE 60 MG/1
60 TABLET, EXTENDED RELEASE ORAL EVERY MORNING
Qty: 90 TABLET | Refills: 3 | Status: SHIPPED | OUTPATIENT
Start: 2023-02-06

## 2023-02-06 RX ORDER — FAMOTIDINE 40 MG/1
40 TABLET, FILM COATED ORAL DAILY
Qty: 90 TABLET | Refills: 3 | Status: SHIPPED | OUTPATIENT
Start: 2023-02-06

## 2023-02-06 RX ORDER — SALSALATE 500 MG
TABLET ORAL
Qty: 384 TABLET | Refills: 3 | Status: SHIPPED | OUTPATIENT
Start: 2023-02-06

## 2023-02-07 ENCOUNTER — HOME CARE VISIT (OUTPATIENT)
Dept: HOME HEALTH SERVICES | Facility: CLINIC | Age: 68
End: 2023-02-07
Payer: MEDICARE

## 2023-02-07 PROCEDURE — G0152 HHCP-SERV OF OT,EA 15 MIN: HCPCS

## 2023-02-07 NOTE — HOME HEALTH
FOCUS OF CARE/SKILLED NEED: HOME SAFETY/FALL PREVENTION, MEDICATION EDUCATION, PAIN MANAGEMENT, PREVENTATIVE SKIN CARE, pressure wound prevention, catheter care, s/s of infection    TEACHING/INTERVENTIONS:  HOME SAFETY/FALL PREVENTION, MEDICATION EDUCATION, PAIN MANAGEMENT, PREVENTATIVE SKIN CARE, pressure wound orevention, catheter care, s/s of infection    PROGRESS TOWARDS GOALS: progressing    RECENT FALLS:  none    RECENT MEDICATION CHANGES:  none    WOUND(S):  none    D/C PLAN:  DISCHARGE WITH GOALS MET    PHYSICIAN CONTACT:   none     INSURANCE CHANGES: none

## 2023-02-08 ENCOUNTER — TELEPHONE (OUTPATIENT)
Dept: INTERNAL MEDICINE | Facility: CLINIC | Age: 68
End: 2023-02-08

## 2023-02-08 ENCOUNTER — HOME CARE VISIT (OUTPATIENT)
Dept: HOME HEALTH SERVICES | Facility: CLINIC | Age: 68
End: 2023-02-08
Payer: MEDICARE

## 2023-02-08 VITALS — DIASTOLIC BLOOD PRESSURE: 78 MMHG | SYSTOLIC BLOOD PRESSURE: 138 MMHG

## 2023-02-08 VITALS
RESPIRATION RATE: 20 BRPM | DIASTOLIC BLOOD PRESSURE: 86 MMHG | OXYGEN SATURATION: 96 % | SYSTOLIC BLOOD PRESSURE: 150 MMHG | TEMPERATURE: 97.5 F | HEART RATE: 67 BPM

## 2023-02-08 PROCEDURE — G0157 HHC PT ASSISTANT EA 15: HCPCS

## 2023-02-08 NOTE — HOME HEALTH
COVID SCREENING: no s/s  FOCUS OF CARE/SKILLED NEED: transfers, ther ex  TEACHING/INTERVENTIONS: transfers with AD  PROGRESS TOWARD GOALS: pt is progressing toward goals  PHYSICIAN CONTACT: n/a  INSURANCE CHANGES: no  FALLS SINCE LAST HH VISIT? no  MEDICATION CHANGES SINCE LAST HH VISIT? no  PLAN FOR NEXT VISIT: transfers, ther ex    Pt has no new c/o today.

## 2023-02-08 NOTE — TELEPHONE ENCOUNTER
Pt informed that the Cephalexin is from Infectious Disease, for chronic prophylaxis, and they were supposed to send in a refill to [facility] for her on Monday.  She will double check with [facility] and then call Dr. Ohara's office if there is not a Rx there.

## 2023-02-08 NOTE — TELEPHONE ENCOUNTER
Caller: Tawny Shin    Relationship: Self    Best call back number: [phone]    What is the best time to reach you:  ANYTIME    Who are you requesting to speak with (clinical staff, provider,  specific staff member):  CLINICAL    What was the call regarding:  IF PATIENT NEEDS TO CONTINUE TAKING CEPHALEXINE FOR UTI.  PATIENT WANTS TO KNOW IF SHE NEEDS TO CONTINUE TAKING ANTIBIOTIC FOR UTI.  PATIENT SAID SHE COULDN'T REMEMBER IF HER NURSING HOME WANTED HER TO CONTINUE TAKING CEPHALEXINE FOR UTI.  PATIENT REPORTED THAT SHE DOESN'T HAVE ANY UTI SYMPTOMS ANYMORE.      Do you require a callback:  YES

## 2023-02-09 ENCOUNTER — HOME CARE VISIT (OUTPATIENT)
Dept: HOME HEALTH SERVICES | Facility: CLINIC | Age: 68
End: 2023-02-09
Payer: MEDICARE

## 2023-02-09 VITALS
HEART RATE: 68 BPM | SYSTOLIC BLOOD PRESSURE: 132 MMHG | RESPIRATION RATE: 20 BRPM | OXYGEN SATURATION: 96 % | DIASTOLIC BLOOD PRESSURE: 74 MMHG | TEMPERATURE: 98.7 F

## 2023-02-09 PROCEDURE — G0299 HHS/HOSPICE OF RN EA 15 MIN: HCPCS

## 2023-02-09 NOTE — HOME HEALTH
Covid screen completed    Subjective: Patient up in power wheelchair on arrival. Patient states her caregiver has gone to  medication refills. Patient reports that she has been out of some meds due to confusion at pharmacy, but now problem has been corrected.    Falls:none    Medication changes:none    Insurance changes:none    Edema:none    Medical necessity for continued visits: Skilled OT medically necessary to continue in order to address UB HEP and safe functional transfers.    Next MD appt:pending    Plan for next visit: Plan to complete UB HEP and address transfers as tolerated.

## 2023-02-09 NOTE — HOME HEALTH
FOCUS OF CARE/SKILLED NEED:  Urinary catheter evaluation on catheter care - which the caregiver provides for the patient as well as personal care and skin care.  Patient is transferred to wheelchair using a johan lift and patient states she doesn't want to be transferred back to bed for nurse to evaluation.  Per caregiver - confirmed that the skin on bottom is no longer open, only pink in color.  Evaluated right ankle and removed dressing that was on patient's skin - dry skin underneath, not open.  Patient likes to have on her skin in case bumps ankle on wheelchair or on johan during transfers.  No open areas at this time.  Urine is clear yellow, patient denies any difficulties with the catheter.    TEACHING/INTERVENTIONS: Urinary catheter care, instructed on next week will have SN visit for urinary catheter change - and is agreeable for another SN to attend the visit and perform the catheter change. Patient hasn't established appointment with urologist at this time, but is planning on seeing Urologist Dr. Plascencia and will schedule an appointment.  Discussed with patient decreasing snv frequency after next week.  Patient states she is independent with her meidcations and understands what her medications are for, effects and side effects.    Urinary Catheter Supplies ordered for the patient while in the home:  16 Turkish catheter and catheter insertion kit with the bedside bag.  Urinary Leg bag, 10 ml syringers to deflate the balloon, optifoam for pressure areas.  Instructed patient to expect a package with catheter supplies.      PROGRESS TOWARD GOALS:  Resolved Wound, patient safety education.  PHYSICIAN CONTACT:Not necessary     FALLS SINCE LAST VISIT? Patient denies falls    MEDICATION CHANGES SINCE LAST VISIT?  No changes.  Patient is on  a prophylatic antibiotics - and used for most recent urine specimen.    PLAN FOR NEXT VISIT:  Next week for urinary catheter changed, discuss decreasing snv to once a month for  urinary catheter changes, medications, skin assessment    COVID SCREENING:  Denies known exposure to COVID and denies COVID Symptoms

## 2023-02-10 ENCOUNTER — HOME CARE VISIT (OUTPATIENT)
Dept: HOME HEALTH SERVICES | Facility: CLINIC | Age: 68
End: 2023-02-10
Payer: MEDICARE

## 2023-02-10 VITALS
OXYGEN SATURATION: 96 % | HEART RATE: 63 BPM | SYSTOLIC BLOOD PRESSURE: 142 MMHG | RESPIRATION RATE: 16 BRPM | TEMPERATURE: 96.8 F | DIASTOLIC BLOOD PRESSURE: 80 MMHG

## 2023-02-10 PROCEDURE — G0151 HHCP-SERV OF PT,EA 15 MIN: HCPCS

## 2023-02-10 NOTE — PROGRESS NOTES
[facility] - PODIATRY    Today's Date: 02/21/2023     Patient Name: Tawny Shin  MRN: [id number]  CSN: [id number]  PCP: Moises Oliveira MD  Referring Provider: No ref. provider found    SUBJECTIVE     Chief Complaint   Patient presents with   • Follow-up     Leta Julian DO -09/07/2022-3 month fu nondiabetic nail care-pt states she is here today for non diabetic nail care-pt presents in wheel chair     HPI: Tawny Shin, a 67 y.o.female, comes to clinic as a(n) established patient complaining of thickened, irregular toenails of both feet. Patient has h/o arthritis, asthma, bunions, CA, Heart block, Charcot joint of left foot, DVT, COPD, CAD, HLD, HTN, Hypothyroid, MI, pacemaker. Patient presents for nail care of thickened, irregular toenails of both feet. Denies any open wounds or sores currently. States that she feels like she has some neuropathy in feet as she is unable to fully feel toes. Continues use of wheelchair. States she is now back home after spending some time in SNF. Denies pain today. Relates previous treatment(s) including care by podiatry. Patient continues Eliquis daily. Denies any constitutional symptoms. No other pedal complaints at this time.    Past Medical History:   Diagnosis Date   • Age-related osteoporosis with current pathological fracture 05/27/2020   • Arthritis    • Asthma    • Bilateral bunions 12/23/2020   • Cancer (HCC)    • Cardiac pacemaker syndrome 12/23/2020    Overview:  - heart block - implanted 11/16   • Charcot's joint of foot, left 12/23/2020   • Chronic deep vein thrombosis (DVT) of right lower extremity (HCC) 06/23/2021   • Chronic pain syndrome 06/22/2021   • Chronic sinusitis    • COPD (chronic obstructive pulmonary disease) (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   • Elevated cholesterol    • Eustachian tube dysfunction    • Heart disease    • Herpes simplex    • History of transfusion   "  • Hyperlipidemia    • Hypertension    • Hypothyroidism 12/23/2020   • Intrinsic asthma 12/23/2020   • Knee dislocation    • Labral tear of right hip joint    • Laryngitis sicca    • Laryngitis, chronic    • Left carotid bruit 03/09/2016   • MI (myocardial infarction) ([facility])    • Myalgia due to statin 06/25/2019   • Open wound of right hip 09/14/2021   • Osteomyelitis of right femur ([facility]) 07/06/2021   • Otorrhea    • Pacemaker 11/17/2016   • Primary osteoarthritis of left knee 12/23/2020   • Psoriasis vulgaris 12/23/2020   • S/P coronary artery stent placement 03/09/2016   • Sensorineural hearing loss    • Seropositive rheumatoid arthritis of multiple sites ([facility]) 12/27/2019    Overview:  -myochrysine '93-'96 -methotrexate '96--->11/98;r/s  restarted 2/99--> 8/14 (anemia) -sulfasalazine- not effective -penicillamine 6/98-->10/98; no effect -leflunomide 11/98--> - Humira '13-->didn't take - Enbrel 12/14-->3/15- no effect!   Last Assessment & Plan:  - \"aching all over\" because she had to be off her anti-rheumatic drugs for 2 weeks in preparation for her R knee surgery - he   • Sick sinus syndrome ([facility]) 12/27/2019   • Sjogren's disease ([facility])    • Spondylolisthesis of lumbar region 01/17/2018   • Syncope, recurrent 02/08/2021   • Urinary tract infection      Past Surgical History:   Procedure Laterality Date   • A-V CARDIAC PACEMAKER INSERTION  2016   • ATRIAL CARDIAC PACEMAKER INSERTION     • CARDIAC CATHETERIZATION     • CATARACT EXTRACTION     • CERVICAL CORPECTOMY N/A 3/3/2021    Procedure: CERVICAL 6 CORPECTOMY WITH TITANIUM CAGE WITH NEURO MONITORING;  Surgeon: Bandar Shea MD;  Location: [facility];  Service: Neurosurgery;  Laterality: N/A;   • COLONOSCOPY  11/08/2011    One fold in the ascending colon which showed ulcer otherwise normal exam   • COLONOSCOPY  11/12/2004    Normal exam repeat in five years   • CORONARY ANGIOPLASTY WITH STENT PLACEMENT      X 2; 2013 & 2014   • ENDOSCOPY  07/10/2014    " Normal exam   • FLAP LEG Right 9/14/2021    Procedure: RIGHT GLUTEAL FASCIOCUTANEOUS ADVANCEMENT FLAP AND RIGHT TENSOR FASCIAL JESSICA FLAP;  Surgeon: Amadeo Turner MD;  Location:  [facility];  Service: Plastics;  Laterality: Right;   • HIP ABDUCTION TENOTOMY BILATERAL Right 1/14/2021    Procedure: RIGHT HIP GLUTEUS MEDLUS / MINIMUS REPAIR, POSSIBLE ACHILLES ALLOGRAFT;  Surgeon: Nino Carlson MD;  Location:  [facility];  Service: Orthopedics;  Laterality: Right;   • INCISION AND DRAINAGE ABSCESS Right 6/4/2022    Procedure: INCISION AND DRAINAGE ABSCESS right hip;  Surgeon: Magda Salcido MD;  Location:  [facility];  Service: General;  Laterality: Right;   • INCISION AND DRAINAGE ABSCESS Right 6/10/2022    Procedure: RIGHT HIP INCISION AND DRAINAGE. MD NEEDS 3L VANC IRRIGATION, CURRETTES, DAICANS, KERLEX ROLLS;  Surgeon: Amadeo Turner MD;  Location:  [facility];  Service: Plastics;  Laterality: Right;   • INCISION AND DRAINAGE HIP Right 2/9/2021    Procedure: HIP INCISION AND DRAINAGE;  Surgeon: Nino Carlson MD;  Location:  [facility];  Service: Orthopedics;  Laterality: Right;   • INCISION AND DRAINAGE LEG Right 10/24/2021    Procedure: INCISION AND DRAINAGE LOWER EXTREMITY;  Surgeon: Amadeo Turner MD;  Location:  [facility];  Service: Plastics;  Laterality: Right;   • INCISION AND DRAINAGE OF WOUND Right 7/8/2021    Procedure: INCISION AND DRAINAGE WOUND RIGHT HIP;  Surgeon: James Huntley MD;  Location:  [facility];  Service: Orthopedics;  Laterality: Right;   • JOINT REPLACEMENT     • KYPHOPLASTY WITH BIOPSY Bilateral 10/26/2021    Procedure: OARCI 12 KYPHOPLASTY WITH BIOPSY;  Surgeon: Bandar Shea MD;  Location:  [facility];  Service: Neurosurgery;  Laterality: Bilateral;   • LEG DEBRIDEMENT Right 9/14/2021    Procedure: DEBRIDEMENT OF RIGHT HIP WOUND, RIGHT GLUTEAL FASCIOCUTANEOUS ADVANCEMENT FLAP AND RIGHT TENSOR FASCIAL JESSICA FLAP;  Surgeon: Amadeo Turner MD;  Location:  [facility];   Service: Plastics;  Laterality: Right;   • LUMBAR DISCECTOMY Right 3/23/2021    Procedure: LUMBAR DISCECTOMY MICRO, Lumbar 1/2 right;  Surgeon: Bandar Shea MD;  Location:  [facility];  Service: Neurosurgery;  Laterality: Right;   • LUMBAR FUSION N/A 1/19/2018    Procedure: L3-4,L4-5 DECOMPRESSION, POSTERIOR SPINAL FUSION WITH INSTRUMENTATION;  Surgeon: Fortino Oropeza MD;  Location:  [facility];  Service:    • LUMBAR LAMINECTOMY WITH FUSION Left 1/17/2018    Procedure: LEFT L3-4 L4-5 LATERAL LUMBAR INTERBODY FUSION;  Surgeon: Fortino Oropeza MD;  Location:  [facility];  Service:    • MYRINGOTOMY W/ TUBES  09/04/2014    TUBES NO LONGER IN PLACE   • OTHER SURGICAL HISTORY      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 so hardware was removed and spacers were placed   • REPLACEMENT TOTAL KNEE Right      Family History   Problem Relation Age of Onset   • Cancer Mother         Lung cancer   • Heart disease Father         Doied of heart. Attack     Social History     Socioeconomic History   • Marital status:    Tobacco Use   • Smoking status: Never     Passive exposure: Never   • Smokeless tobacco: Never   Vaping Use   • Vaping Use: Never used   Substance and Sexual Activity   • Alcohol use: No   • Drug use: Never   • Sexual activity: Not Currently     Birth control/protection: Abstinence     Allergies   Allergen Reactions   • Atorvastatin Other (See Comments)     LEG CRAMPS     • Amoxicillin Rash   • Escitalopram Rash   • Nabumetone Rash   • Niacin Er Rash   • Penicillin G Rash   • Penicillins Rash   • Simvastatin Rash     Current Outpatient Medications   Medication Sig Dispense Refill   • acetaminophen (TYLENOL) 325 MG tablet Take 650 mg by mouth Every 6 (Six) Hours As Needed (mild pain). Indications: Fever, Pain     • Alirocumab 75 MG/ML solution auto-injector Inject 1 mL under the skin into the appropriate area as directed Every 14 (Fourteen) Days. 2.24 mL 11   • apixaban (ELIQUIS) 5 MG tablet tablet Take 1  tablet by mouth 2 (Two) Times a Day. Indications: Other - full anticoagulation 180 tablet 3   • aspirin 81 MG EC tablet Take 81 mg by mouth Daily. Indications: Disease involving Lipid Deposits in the Arteries     • carvedilol (COREG) 25 MG tablet Take 1 tablet by mouth 2 (Two) Times a Day With Meals. Indications: Heart Attack 180 tablet 3   • cephalexin (KEFLEX) 500 MG capsule Take 2 capsules by mouth 2 (Two) Times a Day. Indications: Infection of the Skin and/or Soft Tissue 120 capsule 3   • Diclofenac Sodium (VOLTAREN) 1 % gel gel Apply 4 g topically to the appropriate area as directed 4 (Four) Times a Day As Needed. Indications: Joint Damage causing Pain and Loss of Function     • DULoxetine (CYMBALTA) 60 MG capsule Take 1 capsule by mouth Daily. Indications: Major Depressive Disorder 90 capsule 3   • famotidine (PEPCID) 40 MG tablet Take 1 tablet by mouth Daily. Indications: Heartburn 90 tablet 3   • ferrous sulfate 324 (65 Fe) MG tablet delayed-release EC tablet Take 1 tablet by mouth Daily With Breakfast. Indications: Iron Deficiency 90 tablet 3   • fluticasone (FLONASE) 50 MCG/ACT nasal spray 1 spray into the nostril(s) as directed by provider Daily. 18.2 mL 5   • folic acid (FOLVITE) 1 MG tablet Take 1 tablet by mouth Daily. Indications: Anemia From Inadequate Folic Acid 90 tablet 3   • furosemide (LASIX) 40 MG tablet Take 1 tablet by mouth Daily. Indications: Edema 90 tablet 3   • HYDROcodone-acetaminophen (NORCO) 7.5-325 MG per tablet Take 1 tablet by mouth Every 8 (Eight) Hours As Needed for Moderate Pain . 9 tablet 0   • isosorbide mononitrate (IMDUR) 60 MG 24 hr tablet Take 1 tablet by mouth Every Morning. Indications: Stable Angina Pectoris 90 tablet 3   • leflunomide (ARAVA) 20 MG tablet Take 1 tablet by mouth every night at bedtime. Indications: Rheumatoid Arthritis 90 tablet 3   • levothyroxine (SYNTHROID, LEVOTHROID) 75 MCG tablet Take 1 tablet by mouth Daily. Indications: Underactive Thyroid 90  tablet 3   • Lidocaine 4 % patch Apply  topically every night at bedtime. To lower back  Indications: Arthritis Related to an Inflammatory Disorder     • magnesium hydroxide (MILK OF MAGNESIA) 400 MG/5ML suspension Take 30 mL by mouth Daily As Needed for Constipation. Indications: Constipation     • Menthol-Zinc Oxide 0.45-20 % ointment Apply 1 application topically 2 (Two) Times a Day. Apply to right buttock  Indications: Abrasion     • multivitamin with minerals tablet tablet Take 1 tablet by mouth Daily.     • nitroglycerin (Nitrostat) 0.4 MG SL tablet Place 1 tablet under the tongue Every 5 (Five) Minutes As Needed for Chest Pain. Take no more than 3 doses in 15 minutes.  12   • polyethylene glycol (MIRALAX) 17 g packet Take 17 g by mouth Daily.     • polyethylene glycol (MIRALAX) 17 GM/SCOOP powder Take 17 g by mouth Daily. Indications: Constipation     • predniSONE (DELTASONE) 5 MG tablet Take 1 tablet by mouth Daily. Indications: Acute Bursitis 90 tablet 3   • salsalate (DISALCID) 500 MG tablet 5 PILLS ON M-W-F-SUNDAY 4 PILLS ON T-TH-SAT  Indications: Rheumatoid Arthritis 384 tablet 3   • sucralfate (CARAFATE) 1 g tablet Take 1 tablet by mouth 4 (Four) Times a Day Before Meals & at Bedtime. Indications: Peptic Ulcer 360 tablet 3   • Thiamine HCl (VITAMIN B-1 PO) Take 100 mg by mouth Daily. Indications: Problem Related to Normal Body Metabolism     • valACYclovir (VALTREX) 500 MG tablet Take 1 tablet by mouth Daily. Indications: Shingles 90 tablet 3     No current facility-administered medications for this visit.     Review of Systems   Constitutional: Negative for chills and fever.   HENT: Negative for congestion.    Respiratory: Negative for shortness of breath.    Cardiovascular: Positive for leg swelling. Negative for chest pain.   Gastrointestinal: Negative for constipation, diarrhea, nausea and vomiting.   Musculoskeletal: Positive for arthralgias, back pain and gait problem.        Foot pain   Skin:  Positive for wound.   Neurological: Positive for numbness.   Hematological: Bruises/bleeds easily.       OBJECTIVE     Vitals:    02/21/23 1036   BP: 150/76   Pulse: 73   SpO2: 95%       PHYSICAL EXAM  GEN:   Accompanied by none.     Foot/Ankle Exam:       General:   Appearance: appears stated age and healthy    Orientation: AAOx3    Affect: appropriate    Assistance: wheelchair    Assistance comment:  Motorized  Shoe Gear:  Casual shoes    VASCULAR      Right Foot Vascularity   Dorsalis pedis:  2+  Posterior tibial:  2+  Skin Temperature: warm    Edema Grading:  Trace  CFT:  3  Pedal Hair Growth:  Present  Varicosities: spider veins       Left Foot Vascularity   Dorsalis pedis:  2+  Posterior tibial:  2+  Skin Temperature: warm    Edema Grading:  Trace  CFT:  3  Pedal Hair Growth:  Present  Varicosities: spider veins        NEUROLOGIC     Right Foot Neurologic   Light touch sensation:  Diminished  Vibratory sensation:  Diminished  Hot/Cold sensation: diminished    Protective Sensation using [place]an Monofilament:  10     Left Foot Neurologic   Light touch sensation:  Diminished  Vibratory sensation:  Diminished  Hot/cold sensation: diminished    Protective Sensation using [place]an Monofilament:  10     MUSCULOSKELETAL      Right Foot Musculoskeletal   Ecchymosis:  Toe 1  Tenderness: none    Arch:  Normal  Hammertoe:  Second toe, third toe, fourth toe and fifth toe  Hallux valgus: Yes       Left Foot Musculoskeletal   Ecchymosis:  None  Tenderness: none    Arch:  Normal  Hammertoe:  Second toe, third toe, fourth toe and fifth toe     MUSCLE STRENGTH     Right Foot Muscle Strength   Foot dorsiflexion:  4-  Foot plantar flexion:  4-  Foot inversion:  4-  Foot eversion:  4-     Left Foot Muscle Strength   Foot dorsiflexion:  4-  Foot plantar flexion:  4-  Foot inversion:  4-  Foot eversion:  4-     RANGE OF MOTION      Right Foot Range of Motion   Foot and ankle ROM within normal limits       Left Foot  Range of Motion   Foot and ankle ROM within normal limits       DERMATOLOGIC     Right Foot Dermatologic   Skin: dry skin    Skin: no right foot cellulitis, no right foot redness and no right foot ulcer    Nails: onychomycosis, abnormally thick and dystrophic nails       Left Foot Dermatologic   Skin: dry skin    Skin: no left foot ulcer    Nails: onychomycosis, abnormally thick, dystrophic nails and ingrown toenail (hallux, mild)        RADIOLOGY/NUCLEAR:  No results found.    LABORATORY/CULTURE RESULTS:      PATHOLOGY RESULTS:       ASSESSMENT/PLAN     Diagnoses and all orders for this visit:    1. Thickened nail (Primary)    2. Anticoagulant long-term use    3. Wheelchair dependent    4. Functional neurological symptom disorder with weakness or paralysis    5. Functional gait abnormality    6. Hallux valgus, right    7. Ingrown left big toenail      Comprehensive lower extremity examination and evaluation was performed.  Discussed findings and treatment plan including risks, benefits, and treatment options with patient in detail. Patient agreed with treatment plan.  After verbal consent obtained, nail(s) x10 debrided of length and thickness with nail nipper without incidence  After verbal consent obtained, nail(s) x1 debrided of offending borders with nail nipper without incidence  Patient may maintain nails and calluses at home utilizing emery board or pumice stone between visits as needed   Remain conservative with foot deformities.    An After Visit Summary was printed and given to the patient at discharge, including (if requested) any available informative/educational handouts regarding diagnosis, treatment, or medications. All questions were answered to patient/family satisfaction. Should symptoms fail to improve or worsen they agree to call or return to clinic or to go to the Emergency Department. Discussed the importance of following up with any needed screening tests/labs/specialist appointments and any  requested follow-up recommended by me today. Importance of maintaining follow-up discussed and patient accepts that missed appointments can delay diagnosis and potentially lead to worsening of conditions.  Return in about 3 months (around 5/21/2023)., or sooner if acute issues arise.    Lab Frequency Next Occurrence   Follow Anesthesia Guidelines / Standing Orders Once 03/02/2021   Provide NPO Instructions to Patient Once 03/07/2021   Chlorhexidine Skin Prep Once 03/07/2021   Obtain Informed Consent Once 03/03/2021       This document has been electronically signed by Robert Dickerson DPM on February 21, 2023 11:03 CST

## 2023-02-10 NOTE — HOME HEALTH
pre-screened covid 19    Patient wants to continue with physical therapy.  Patient has a right ankle planterflexion contracture and limited strength/mobilty in right knee. Patient has a spacer in right knee x4 years due to a prevous infection after a replacement surgery.  Added new treatment goal this session.  Patient to continue with physical therapy to progress to stand pivot transfers from powerchair to hospital bed.

## 2023-02-14 ENCOUNTER — HOME CARE VISIT (OUTPATIENT)
Dept: HOME HEALTH SERVICES | Facility: CLINIC | Age: 68
End: 2023-02-14
Payer: MEDICARE

## 2023-02-14 VITALS
OXYGEN SATURATION: 95 % | TEMPERATURE: 98 F | SYSTOLIC BLOOD PRESSURE: 142 MMHG | HEART RATE: 69 BPM | RESPIRATION RATE: 12 BRPM | DIASTOLIC BLOOD PRESSURE: 80 MMHG

## 2023-02-14 PROCEDURE — G0157 HHC PT ASSISTANT EA 15: HCPCS

## 2023-02-14 NOTE — HOME HEALTH
COVID SCREENING: no s/s  FOCUS OF CARE/SKILLED NEED: transfers, ther ex  TEACHING/INTERVENTIONS: transfers, WB  PROGRESS TOWARD GOALS: pt is progressing toward goals  PHYSICIAN CONTACT: n/a  INSURANCE CHANGES: no  FALLS SINCE LAST HH VISIT? no  MEDICATION CHANGES SINCE LAST HH VISIT? no  PLAN FOR NEXT VISIT: transfers, ther ex    Pt has no new c/o today.

## 2023-02-15 ENCOUNTER — HOME CARE VISIT (OUTPATIENT)
Dept: HOME HEALTH SERVICES | Facility: CLINIC | Age: 68
End: 2023-02-15
Payer: MEDICARE

## 2023-02-15 PROCEDURE — G0152 HHCP-SERV OF OT,EA 15 MIN: HCPCS

## 2023-02-16 ENCOUNTER — HOME CARE VISIT (OUTPATIENT)
Dept: HOME HEALTH SERVICES | Facility: HOME HEALTHCARE | Age: 68
End: 2023-02-16
Payer: MEDICARE

## 2023-02-16 ENCOUNTER — HOME CARE VISIT (OUTPATIENT)
Dept: HOME HEALTH SERVICES | Facility: CLINIC | Age: 68
End: 2023-02-16
Payer: MEDICARE

## 2023-02-16 VITALS
DIASTOLIC BLOOD PRESSURE: 84 MMHG | RESPIRATION RATE: 18 BRPM | OXYGEN SATURATION: 93 % | HEART RATE: 74 BPM | SYSTOLIC BLOOD PRESSURE: 152 MMHG | TEMPERATURE: 97.7 F

## 2023-02-16 VITALS
SYSTOLIC BLOOD PRESSURE: 138 MMHG | OXYGEN SATURATION: 98 % | DIASTOLIC BLOOD PRESSURE: 78 MMHG | HEART RATE: 95 BPM | TEMPERATURE: 98.1 F | RESPIRATION RATE: 18 BRPM

## 2023-02-16 PROCEDURE — G0299 HHS/HOSPICE OF RN EA 15 MIN: HCPCS

## 2023-02-16 NOTE — HOME HEALTH
COVID SCREENING: No s/s of infection or exposure    FOCUS OF CARE/SKILLED NEED: Urinary catheter change, skin assessment    TEACHING/INTERVENTIONS: Pt in bed on arrival, cg had just completed her bath. 16 FR urinary catheter w/ 10ml in balloon inserted x 1 attempt using sterile technique, no complications. Clear yellow urine returned. Catheter to be changed monthly until appointment with Dr. Plascencia for evaluation. Pt states she will make the appointment today.  Skin assessed on buttocks and sacral area, no open spots, pink blanchable skin, moisture barrier applied. Pt has dry skin on left lateral ankle. States the wheelchair rubs her ankle and likes to have an optifoam border dressing over it for prevention of skin breakdown. No other skin issues noted.    PATIENT/CG RESPONSE: Cooperative, tolerated well.    PROGRESS TOWARD GOALS: Ongoing    PHYSICIAN CONTACT: Pt to call Dr. Plascencia, urologist, offerred assistance to call, pt declines assistance.    FALLS SINCE LAST VISIT? No    MEDICATION CHANGES SINCE LAST VISIT? No    PLAN FOR NEXT VISIT: Urinary catheter change, skin assessment.

## 2023-02-16 NOTE — CASE COMMUNICATION
SNV completed today for routine urinary catheter change.  Skin assessment completed, both gluteal pressure ulcer and right ankle wounds are healed.  Patient states she is calling today to Dr. Plascencia's office to schedule follow up appointment regarding plan for urinary catheter.  Discussed with patient will be decreasing SNV to once a month for urinary cath changes and skin assessment.  Explained to patient if concerns or issues arise to  call agency and can come out if needed.  Patient is agreeable.

## 2023-02-17 ENCOUNTER — HOME CARE VISIT (OUTPATIENT)
Dept: HOME HEALTH SERVICES | Facility: CLINIC | Age: 68
End: 2023-02-17
Payer: MEDICARE

## 2023-02-17 ENCOUNTER — HOME CARE VISIT (OUTPATIENT)
Dept: HOME HEALTH SERVICES | Facility: HOME HEALTHCARE | Age: 68
End: 2023-02-17
Payer: MEDICARE

## 2023-02-17 VITALS
SYSTOLIC BLOOD PRESSURE: 140 MMHG | DIASTOLIC BLOOD PRESSURE: 73 MMHG | OXYGEN SATURATION: 98 % | RESPIRATION RATE: 16 BRPM | HEART RATE: 65 BPM

## 2023-02-17 PROCEDURE — G0151 HHCP-SERV OF PT,EA 15 MIN: HCPCS

## 2023-02-17 NOTE — HOME HEALTH
During this episode of care pt received education and trainning on safe transfer and ambulaiton technique with use of RWx. HEP was developed and pt demonstrated correct independent completion and daily compliance. Education was provided on medication management, home safety - pt and caregiver verbalized understanding. Discharged from skilled physical therapy and home health care today due to meeting goals and/or meeting maximal potential with functional mobility.

## 2023-02-17 NOTE — HOME HEALTH
Covid screen completed    Subjective: Patient here with caregiver and friends who have come to visit. Patient reports she has been wanting to try transferring to the commode.    Falls: none    Medication changes: none    Insurance changes:none    Edema: none    Medical necessity for continued visits: Skilled OT medically necessary to address UB strength for functional transfers and ADLS    Next MD appt: pending    Plan for next visit: Plan to address shower transfer as tolerated

## 2023-02-19 NOTE — HOME HEALTH
Patient reports feeling well today. DEnies  signs and symptoms of COVID,  falls, denies medication changes. Reports intermittent compliance with HEP due to limited activity tolerance and knee pain. Patient reports R lat ankle soreness and abrasion. Educated the patient in benefits of positioning device for the night- time, such as Prevalon boot with a lateral kick stand. She verbalized understanding. Worked on standing at the countertop with CGA; weight shifting to the R while elevating the LLE with MIn A for improved weight shifting. INstructed the patient in seated LAQ and HC with the resistance of a red theraband, x 10. Instructed in R hip abduction in L side lying and in standing with red t-band; patient verbalized understanding. Tolerated well. Planning to continue with standing progressive strnegthening exs, weight shifting, wheelchair self propulsiong, standing balance training.

## 2023-02-20 ENCOUNTER — HOME CARE VISIT (OUTPATIENT)
Dept: HOME HEALTH SERVICES | Facility: CLINIC | Age: 68
End: 2023-02-20
Payer: MEDICARE

## 2023-02-20 ENCOUNTER — TELEPHONE (OUTPATIENT)
Dept: PODIATRY | Facility: CLINIC | Age: 68
End: 2023-02-20
Payer: MEDICARE

## 2023-02-20 VITALS
DIASTOLIC BLOOD PRESSURE: 86 MMHG | TEMPERATURE: 97.5 F | OXYGEN SATURATION: 96 % | HEART RATE: 73 BPM | SYSTOLIC BLOOD PRESSURE: 170 MMHG | RESPIRATION RATE: 18 BRPM

## 2023-02-20 PROCEDURE — G0157 HHC PT ASSISTANT EA 15: HCPCS

## 2023-02-20 NOTE — HOME HEALTH
"COVID SCREENING: no s/s  FOCUS OF CARE/SKILLED NEED: transfers  TEACHING/INTERVENTIONS: transfers, weight shift/bearing  PROGRESS TOWARD GOALS: pt is progressing toward goals  PHYSICIAN CONTACT: n/a  INSURANCE CHANGES: no  FALLS SINCE LAST HH VISIT? no  MEDICATION CHANGES SINCE LAST HH VISIT? no  PLAN FOR NEXT VISIT: transfers with rwx, possibly attempt to pivot transfer from the power chair to the edge of the bed    Pt says that her head is \"full\" today. Pt says that she has been sick with cold-like symptoms recently, but feels better today."

## 2023-02-21 ENCOUNTER — OFFICE VISIT (OUTPATIENT)
Dept: PODIATRY | Facility: CLINIC | Age: 68
End: 2023-02-21
Payer: MEDICARE

## 2023-02-21 VITALS
BODY MASS INDEX: 28.28 KG/M2 | HEART RATE: 73 BPM | WEIGHT: 176 LBS | HEIGHT: 66 IN | OXYGEN SATURATION: 95 % | DIASTOLIC BLOOD PRESSURE: 76 MMHG | SYSTOLIC BLOOD PRESSURE: 150 MMHG

## 2023-02-21 DIAGNOSIS — Z99.3 WHEELCHAIR DEPENDENT: ICD-10-CM

## 2023-02-21 DIAGNOSIS — L60.2 THICKENED NAIL: Primary | ICD-10-CM

## 2023-02-21 DIAGNOSIS — L60.0 INGROWN LEFT BIG TOENAIL: ICD-10-CM

## 2023-02-21 DIAGNOSIS — M20.11 HALLUX VALGUS, RIGHT: ICD-10-CM

## 2023-02-21 DIAGNOSIS — R26.89 FUNCTIONAL GAIT ABNORMALITY: ICD-10-CM

## 2023-02-21 DIAGNOSIS — F44.4 FUNCTIONAL NEUROLOGICAL SYMPTOM DISORDER WITH WEAKNESS OR PARALYSIS: ICD-10-CM

## 2023-02-21 DIAGNOSIS — Z79.01 ANTICOAGULANT LONG-TERM USE: ICD-10-CM

## 2023-02-21 PROCEDURE — 11721 DEBRIDE NAIL 6 OR MORE: CPT | Performed by: PODIATRIST

## 2023-02-23 ENCOUNTER — HOME CARE VISIT (OUTPATIENT)
Dept: HOME HEALTH SERVICES | Facility: CLINIC | Age: 68
End: 2023-02-23
Payer: MEDICARE

## 2023-02-23 VITALS
HEART RATE: 69 BPM | OXYGEN SATURATION: 97 % | TEMPERATURE: 98.8 F | RESPIRATION RATE: 16 BRPM | SYSTOLIC BLOOD PRESSURE: 164 MMHG | DIASTOLIC BLOOD PRESSURE: 90 MMHG

## 2023-02-23 PROCEDURE — G0152 HHCP-SERV OF OT,EA 15 MIN: HCPCS

## 2023-02-24 ENCOUNTER — HOME CARE VISIT (OUTPATIENT)
Dept: HOME HEALTH SERVICES | Facility: CLINIC | Age: 68
End: 2023-02-24
Payer: MEDICARE

## 2023-02-24 NOTE — CASE COMMUNICATION
Patient missed a physical therapy visit from [facility] on 2023.     Reason: I attempted to schedule a physical therapy visit with the patient at 17:30 on 2023; but patient was unavailable due to she was going to attend a .  My schedule was full and their were no other times available on 2023.         For your records only.   As per home health protocol, MD must be notified of missed/cancelled visits; theref ore the prescribed frequency was not met.

## 2023-02-26 NOTE — HOME HEALTH
Covid screen completed    Subjective:Patient states she has a lot of sinus congestion and has been taking Anayeli Chadron Plus cold medicine. Patient educated that this could cause high BP. Patient educated on cold meds for use with HTN.    Falls:none    Medication changes:none    Insurance changes:none    Edema:not assessed this date    Medical necessity for continued visits: Skilled OT medically necessary to continue UB HEP for increased strength for ADL's.    Next MD appt: uncertain    Plan for next visit:Plan to upgrade HEP and complete shower transfer as tolerated.

## 2023-02-28 ENCOUNTER — TELEPHONE (OUTPATIENT)
Dept: INTERNAL MEDICINE | Facility: CLINIC | Age: 68
End: 2023-02-28

## 2023-02-28 ENCOUNTER — HOME CARE VISIT (OUTPATIENT)
Dept: HOME HEALTH SERVICES | Facility: CLINIC | Age: 68
End: 2023-02-28
Payer: MEDICARE

## 2023-02-28 VITALS
HEART RATE: 65 BPM | TEMPERATURE: 97.4 F | DIASTOLIC BLOOD PRESSURE: 84 MMHG | OXYGEN SATURATION: 96 % | RESPIRATION RATE: 14 BRPM | SYSTOLIC BLOOD PRESSURE: 164 MMHG

## 2023-02-28 DIAGNOSIS — I10 ESSENTIAL HYPERTENSION: Primary | Chronic | ICD-10-CM

## 2023-02-28 PROCEDURE — G0157 HHC PT ASSISTANT EA 15: HCPCS

## 2023-02-28 RX ORDER — LOSARTAN POTASSIUM 50 MG/1
50 TABLET ORAL DAILY
Qty: 30 TABLET | Refills: 2 | Status: SHIPPED | OUTPATIENT
Start: 2023-02-28 | End: 2023-07-21

## 2023-02-28 NOTE — TELEPHONE ENCOUNTER
Caller: LONNIE - [facility]    Relationship: Other    Best call back number:  [phone]    What is the best time to reach you:  ANYTIME    Who are you requesting to speak with (clinical staff, provider,  specific staff member):  CLINICAL     What was the call regarding:  LONNIE FROM [facility] CALLED TO PROVIDE UPDATE ON PATIENT'S BLOOD PRESSURE.  CALLER REPORTED THAT PATIENT'S BP /84 AT START OF VISIT AND WAS RECHECKED  WITH ACTIVITY /92, TRENDING HIGH A FEW TIMES DURING VISIT.

## 2023-03-02 ENCOUNTER — HOME CARE VISIT (OUTPATIENT)
Dept: HOME HEALTH SERVICES | Facility: CLINIC | Age: 68
End: 2023-03-02
Payer: MEDICARE

## 2023-03-02 ENCOUNTER — HOME CARE VISIT (OUTPATIENT)
Dept: HOME HEALTH SERVICES | Facility: HOME HEALTHCARE | Age: 68
End: 2023-03-02
Payer: MEDICARE

## 2023-03-03 ENCOUNTER — HOME CARE VISIT (OUTPATIENT)
Dept: HOME HEALTH SERVICES | Facility: CLINIC | Age: 68
End: 2023-03-03
Payer: MEDICARE

## 2023-03-03 VITALS
RESPIRATION RATE: 16 BRPM | DIASTOLIC BLOOD PRESSURE: 80 MMHG | HEART RATE: 80 BPM | TEMPERATURE: 96.4 F | OXYGEN SATURATION: 97 % | SYSTOLIC BLOOD PRESSURE: 140 MMHG

## 2023-03-03 PROCEDURE — G0151 HHCP-SERV OF PT,EA 15 MIN: HCPCS

## 2023-03-06 ENCOUNTER — HOME CARE VISIT (OUTPATIENT)
Dept: HOME HEALTH SERVICES | Facility: CLINIC | Age: 68
End: 2023-03-06
Payer: MEDICARE

## 2023-03-06 VITALS
RESPIRATION RATE: 20 BRPM | OXYGEN SATURATION: 95 % | DIASTOLIC BLOOD PRESSURE: 78 MMHG | TEMPERATURE: 97.4 F | HEART RATE: 71 BPM | SYSTOLIC BLOOD PRESSURE: 128 MMHG

## 2023-03-06 PROCEDURE — G0157 HHC PT ASSISTANT EA 15: HCPCS

## 2023-03-06 NOTE — HOME HEALTH
COVID SCREENING: no s/s  FOCUS OF CARE/SKILLED NEED: transfers  TEACHING/INTERVENTIONS: transfers, ther ex  PROGRESS TOWARD GOALS: pt is progressing toward goals  PHYSICIAN CONTACT: n/a  INSURANCE CHANGES: no  FALLS SINCE LAST HH VISIT? no  MEDICATION CHANGES SINCE LAST HH VISIT? no  PLAN FOR NEXT VISIT: transfers, gait if/when pt is able    Pt c/o a headache today. She says that she has been transferring to the power chair with the sliding board. Pt also c/o left foot pain from a recent accident with the power chair. Pt is considering having it x-rayed. Pt said that she would wait until shee sees Dr Oliveira next week to have it addressed.

## 2023-03-07 ENCOUNTER — HOME CARE VISIT (OUTPATIENT)
Dept: HOME HEALTH SERVICES | Facility: CLINIC | Age: 68
End: 2023-03-07
Payer: MEDICARE

## 2023-03-07 VITALS
SYSTOLIC BLOOD PRESSURE: 124 MMHG | DIASTOLIC BLOOD PRESSURE: 70 MMHG | TEMPERATURE: 97.9 F | RESPIRATION RATE: 16 BRPM | OXYGEN SATURATION: 96 % | HEART RATE: 65 BPM

## 2023-03-07 PROCEDURE — G0152 HHCP-SERV OF OT,EA 15 MIN: HCPCS

## 2023-03-08 ENCOUNTER — HOME CARE VISIT (OUTPATIENT)
Dept: HOME HEALTH SERVICES | Facility: HOME HEALTHCARE | Age: 68
End: 2023-03-08
Payer: MEDICARE

## 2023-03-09 ENCOUNTER — HOME CARE VISIT (OUTPATIENT)
Dept: HOME HEALTH SERVICES | Facility: CLINIC | Age: 68
End: 2023-03-09
Payer: MEDICARE

## 2023-03-09 ENCOUNTER — TELEPHONE (OUTPATIENT)
Dept: INTERNAL MEDICINE | Facility: CLINIC | Age: 68
End: 2023-03-09
Payer: MEDICARE

## 2023-03-09 VITALS
HEART RATE: 66 BPM | SYSTOLIC BLOOD PRESSURE: 110 MMHG | OXYGEN SATURATION: 98 % | RESPIRATION RATE: 16 BRPM | TEMPERATURE: 97.4 F | DIASTOLIC BLOOD PRESSURE: 64 MMHG

## 2023-03-09 DIAGNOSIS — Z97.8 CHRONIC INDWELLING FOLEY CATHETER: Primary | ICD-10-CM

## 2023-03-09 PROCEDURE — G0151 HHCP-SERV OF PT,EA 15 MIN: HCPCS

## 2023-03-09 PROCEDURE — G0493 RN CARE EA 15 MIN HH/HOSPICE: HCPCS

## 2023-03-09 NOTE — TELEPHONE ENCOUNTER
Caller: Tawny Shin    Relationship: Self    Best call back number: [phone]    What specialty or service is being requested: UROLOGY    What is the provider, practice or medical service name: DR. MCCAULEY    What is the office location: [address] [place] [address], [address], [place] [address]    What is the office phone number: [phone]

## 2023-03-09 NOTE — CASE COMMUNICATION
Unable to reach patient to confirm SNV for today - left two messages on the patient's phone.  Spoke with Physical Therapy Adonis, had same difficulty reaching the patient.    Plan to see patient tomorrow, 3/9/23 for recert visit, left patient a third message with time of arrival and call back number.

## 2023-03-09 NOTE — HOME HEALTH
Covid screen completed    Subjective: Patient reports she has been progressing well with PT and has been doing her exercises. Patient hopes to continue working with home health. Patient reports she is now using the sliding board to get in and out of bed to power chair.    Falls:none    Medication changes: none    Insurance changes: none    Edema: none    Medical necessity for continued visits: Skilled OT medically necessary to continue in order to address shower and toileting skills for return to OF.    Next MD appt: 3/8/23    Plan for next visit: Plan to address shower if patient has obtained chair.

## 2023-03-09 NOTE — TELEPHONE ENCOUNTER
Called pt - needs referral for catheter care/follow up.  As per discussion at last visit, will place referral.

## 2023-03-10 VITALS
TEMPERATURE: 97.4 F | HEART RATE: 66 BPM | DIASTOLIC BLOOD PRESSURE: 64 MMHG | RESPIRATION RATE: 18 BRPM | SYSTOLIC BLOOD PRESSURE: 110 MMHG | OXYGEN SATURATION: 98 %

## 2023-03-10 NOTE — HOME HEALTH
FOCUS OF CARE/SKILLED NEED: Rheumatoid Arthritis, Urinary Catheter Device, HTN    TEACHING/INTERVENTIONS: Instructed on signs and symptoms of UTI, medication instruction, skin assessment.  Patient was started on a new medication Losartan for blood pressure, which has stabilized.       PLAN FOR NEXT VISIT:  Next week, 3/13 for urinary catheter change    PRE-SCREENED FOR COVID? Yes, No s/s of COVID. No recent exposure.

## 2023-03-10 NOTE — CASE COMMUNICATION
Sixty Day Summary:    Patient wound on gluteus has healed, as well as the right lateral ankle wound.   Patient urinary catheter continues to be changed once a month without complications.  Patient blood pressure elevated, and PCP added Losartan to medications and blood pressure has stablized.

## 2023-03-13 ENCOUNTER — HOME CARE VISIT (OUTPATIENT)
Dept: HOME HEALTH SERVICES | Facility: CLINIC | Age: 68
End: 2023-03-13
Payer: MEDICARE

## 2023-03-13 VITALS
OXYGEN SATURATION: 97 % | SYSTOLIC BLOOD PRESSURE: 122 MMHG | DIASTOLIC BLOOD PRESSURE: 64 MMHG | TEMPERATURE: 96.3 F | RESPIRATION RATE: 18 BRPM | HEART RATE: 52 BPM

## 2023-03-13 PROCEDURE — G0299 HHS/HOSPICE OF RN EA 15 MIN: HCPCS

## 2023-03-13 NOTE — HOME HEALTH
COVID SCREENIG : patient denies any S/S     FOCUS OF CARE/SKILLED NEED : wound prevention , medication education , kay catheter care and education    TEACHING/INTERVENTIONS: educated patient on fall and bleeding precautions and infection control , kay catheter changed per protocol using sterile technique, educated patient and caregiver on ways to prevent skin breakdown and safety     PATIENT/CAREGIVER RESPONSE : verbalizes understanding     PROGRESS TOWARD GOALS: ongoing and progressing     PHYSICAN CONTACT:no    FALLS SINCE LAST VISIT : no    MEDICATION CHANGE SINCE LAST VISIT: no     PLAN FOR NEXT VISIT: continue education on kay care , medication educated , infection control and wound prevention Discussed lid hygiene, warm compress and eyelid wash.

## 2023-03-13 NOTE — PROGRESS NOTES
Chief Complaint  Here to see about getting catheter removed    Subjective          Tawny Shin presents to [facility] [place]   Patient has an indwelling catheter that is been present now for about 8 months.  Catheter was placed to assist in the healing of a cellulitis and osteomyelitis over the right hip area.  Apparently she was having some leakage and it was felt that it would be best if the catheter was left drain.  Patient has had issues with bacteriuria as would be expected with an indwelling catheter.  Given her extended course of antibiotic therapy it appears she has had difficulty with multiresistant bacteria.  At present she is not symptomatic for infection.  Her goal will be to get the catheter out.  She is followed at home by home health.        Current Outpatient Medications:   •  acetaminophen (TYLENOL) 325 MG tablet, Take 2 tablets by mouth Every 6 (Six) Hours As Needed (mild pain). Indications: Fever, Pain, Disp: , Rfl:   •  Alirocumab 75 MG/ML solution auto-injector, Inject 1 mL under the skin into the appropriate area as directed Every 14 (Fourteen) Days., Disp: 2.24 mL, Rfl: 11  •  apixaban (ELIQUIS) 5 MG tablet tablet, Take 1 tablet by mouth 2 (Two) Times a Day. Indications: Other - full anticoagulation, Disp: 180 tablet, Rfl: 3  •  aspirin 81 MG EC tablet, Take 1 tablet by mouth Daily. Indications: Disease involving Lipid Deposits in the Arteries, Disp: , Rfl:   •  carvedilol (COREG) 25 MG tablet, Take 1 tablet by mouth 2 (Two) Times a Day With Meals. Indications: Heart Attack, Disp: 180 tablet, Rfl: 3  •  cephalexin (KEFLEX) 500 MG capsule, Take 2 capsules by mouth 2 (Two) Times a Day. Indications: Infection of the Skin and/or Soft Tissue, Disp: 120 capsule, Rfl: 3  •  ciprofloxacin (Cipro) 500 MG tablet, Take 1 tablet by mouth 2 (Two) Times a Day., Disp: 10 tablet, Rfl: 0  •  Diclofenac Sodium (VOLTAREN) 1 % gel gel, Apply 4 g topically to the appropriate area as  directed 4 (Four) Times a Day As Needed. Indications: Joint Damage causing Pain and Loss of Function, Disp: , Rfl:   •  DULoxetine (CYMBALTA) 60 MG capsule, Take 1 capsule by mouth Daily. Indications: Major Depressive Disorder, Disp: 90 capsule, Rfl: 3  •  famotidine (PEPCID) 40 MG tablet, Take 1 tablet by mouth Daily. Indications: Heartburn, Disp: 90 tablet, Rfl: 3  •  ferrous sulfate 324 (65 Fe) MG tablet delayed-release EC tablet, Take 1 tablet by mouth Daily With Breakfast. Indications: Iron Deficiency, Disp: 90 tablet, Rfl: 3  •  fluticasone (FLONASE) 50 MCG/ACT nasal spray, 1 spray into the nostril(s) as directed by provider Daily., Disp: 18.2 mL, Rfl: 5  •  folic acid (FOLVITE) 1 MG tablet, Take 1 tablet by mouth Daily. Indications: Anemia From Inadequate Folic Acid, Disp: 90 tablet, Rfl: 3  •  furosemide (LASIX) 40 MG tablet, Take 1 tablet by mouth Daily. Indications: Edema, Disp: 90 tablet, Rfl: 3  •  HYDROcodone-acetaminophen (NORCO) 7.5-325 MG per tablet, Take 1 tablet by mouth Every 8 (Eight) Hours As Needed for Moderate Pain ., Disp: 9 tablet, Rfl: 0  •  isosorbide mononitrate (IMDUR) 60 MG 24 hr tablet, Take 1 tablet by mouth Every Morning. Indications: Stable Angina Pectoris, Disp: 90 tablet, Rfl: 3  •  leflunomide (ARAVA) 20 MG tablet, Take 1 tablet by mouth every night at bedtime. Indications: Rheumatoid Arthritis, Disp: 90 tablet, Rfl: 3  •  levothyroxine (SYNTHROID, LEVOTHROID) 75 MCG tablet, Take 1 tablet by mouth Daily. Indications: Underactive Thyroid, Disp: 90 tablet, Rfl: 3  •  Lidocaine 4 % patch, Apply  topically every night at bedtime. To lower back  Indications: Arthritis Related to an Inflammatory Disorder, Disp: , Rfl:   •  losartan (Cozaar) 50 MG tablet, Take 1 tablet by mouth Daily., Disp: 30 tablet, Rfl: 2  •  magnesium hydroxide (MILK OF MAGNESIA) 400 MG/5ML suspension, Take 30 mL by mouth Daily As Needed for Constipation. Indications: Constipation, Disp: , Rfl:   •  Menthol-Zinc  Oxide 0.45-20 % ointment, Apply 1 application topically 2 (Two) Times a Day. Apply to right buttock  Indications: Abrasion, Disp: , Rfl:   •  multivitamin with minerals tablet tablet, Take 1 tablet by mouth Daily., Disp:  , Rfl:   •  nitroglycerin (Nitrostat) 0.4 MG SL tablet, Place 1 tablet under the tongue Every 5 (Five) Minutes As Needed for Chest Pain. Take no more than 3 doses in 15 minutes., Disp: , Rfl: 12  •  polyethylene glycol (MIRALAX) 17 g packet, Take 17 g by mouth Daily., Disp: , Rfl:   •  polyethylene glycol (MIRALAX) 17 GM/SCOOP powder, Take 17 g by mouth Daily. Indications: Constipation, Disp: , Rfl:   •  predniSONE (DELTASONE) 5 MG tablet, Take 1 tablet by mouth Daily. Indications: Acute Bursitis (Patient taking differently: Take 1 mg by mouth 2 (Two) Times a Day. Indications: Acute Bursitis), Disp: 90 tablet, Rfl: 3  •  salsalate (DISALCID) 500 MG tablet, 5 PILLS ON M-W-F-SUNDAY 4 PILLS ON T-TH-SAT  Indications: Rheumatoid Arthritis, Disp: 384 tablet, Rfl: 3  •  sucralfate (CARAFATE) 1 g tablet, Take 1 tablet by mouth 4 (Four) Times a Day Before Meals & at Bedtime. Indications: Peptic Ulcer, Disp: 360 tablet, Rfl: 3  •  Thiamine HCl (VITAMIN B-1 PO), Take 100 mg by mouth Daily. Indications: Problem Related to Normal Body Metabolism, Disp: , Rfl:   •  valACYclovir (VALTREX) 500 MG tablet, Take 1 tablet by mouth Daily. Indications: Shingles, Disp: 90 tablet, Rfl: 3  Past Medical History:   Diagnosis Date   • Age-related osteoporosis with current pathological fracture 05/27/2020   • Arthritis    • Asthma    • Bilateral bunions 12/23/2020   • Cancer ([facility])    • Cardiac pacemaker syndrome 12/23/2020    Overview:  - heart block - implanted 11/16   • Charcot's joint of foot, left 12/23/2020   • Chronic deep vein thrombosis (DVT) of right lower extremity (HCC) 06/23/2021   • Chronic pain syndrome 06/22/2021   • Chronic sinusitis    • COPD (chronic obstructive pulmonary disease) ([facility])    • Coronary artery  "disease    • 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   • Elevated cholesterol    • Eustachian tube dysfunction    • Heart disease    • Herpes simplex    • History of transfusion    • Hyperlipidemia    • Hypertension    • Hypothyroidism 12/23/2020   • Intrinsic asthma 12/23/2020   • Knee dislocation    • Labral tear of right hip joint    • Laryngitis sicca    • Laryngitis, chronic    • Left carotid bruit 03/09/2016   • MI (myocardial infarction) ([facility])    • Myalgia due to statin 06/25/2019   • Open wound of right hip 09/14/2021   • Osteomyelitis of right femur ([facility]) 07/06/2021   • Otorrhea    • Pacemaker 11/17/2016   • Primary osteoarthritis of left knee 12/23/2020   • Psoriasis vulgaris 12/23/2020   • S/P coronary artery stent placement 03/09/2016   • Sensorineural hearing loss    • Seropositive rheumatoid arthritis of multiple sites ([facility]) 12/27/2019    Overview:  -myochrysine '93-'96 -methotrexate '96--->11/98;r/s  restarted 2/99--> 8/14 (anemia) -sulfasalazine- not effective -penicillamine 6/98-->10/98; no effect -leflunomide 11/98--> - Humira '13-->didn't take - Enbrel 12/14-->3/15- no effect!   Last Assessment & Plan:  - \"aching all over\" because she had to be off her anti-rheumatic drugs for 2 weeks in preparation for her R knee surgery - he   • Sick sinus syndrome ([facility]) 12/27/2019   • Sjogren's disease ([facility])    • Spondylolisthesis of lumbar region 01/17/2018   • Syncope, recurrent 02/08/2021   • Urinary tract infection      Past Surgical History:   Procedure Laterality Date   • A-V CARDIAC PACEMAKER INSERTION  2016   • ATRIAL CARDIAC PACEMAKER INSERTION     • CARDIAC CATHETERIZATION     • CATARACT EXTRACTION     • CERVICAL CORPECTOMY N/A 3/3/2021    Procedure: CERVICAL 6 CORPECTOMY WITH TITANIUM CAGE WITH NEURO MONITORING;  Surgeon: Bandar Shea MD;  Location: [facility];  Service: Neurosurgery;  Laterality: N/A;   • COLONOSCOPY  11/08/2011    One fold in the ascending colon which showed ulcer " otherwise normal exam   • COLONOSCOPY  11/12/2004    Normal exam repeat in five years   • CORONARY ANGIOPLASTY WITH STENT PLACEMENT      X 2; 2013 & 2014   • ENDOSCOPY  07/10/2014    Normal exam   • FLAP LEG Right 9/14/2021    Procedure: RIGHT GLUTEAL FASCIOCUTANEOUS ADVANCEMENT FLAP AND RIGHT TENSOR FASCIAL JESSICA FLAP;  Surgeon: Amadeo Turner MD;  Location:  [facility];  Service: Plastics;  Laterality: Right;   • HIP ABDUCTION TENOTOMY BILATERAL Right 1/14/2021    Procedure: RIGHT HIP GLUTEUS MEDLUS / MINIMUS REPAIR, POSSIBLE ACHILLES ALLOGRAFT;  Surgeon: Nino Carlson MD;  Location:  [facility];  Service: Orthopedics;  Laterality: Right;   • INCISION AND DRAINAGE ABSCESS Right 6/4/2022    Procedure: INCISION AND DRAINAGE ABSCESS right hip;  Surgeon: Magda Salcido MD;  Location:  [facility];  Service: General;  Laterality: Right;   • INCISION AND DRAINAGE ABSCESS Right 6/10/2022    Procedure: RIGHT HIP INCISION AND DRAINAGE. MD NEEDS 3L VANC IRRIGATION, CURRETTES, DAICANS, KERLEX ROLLS;  Surgeon: Amadeo Turner MD;  Location:  [facility];  Service: Plastics;  Laterality: Right;   • INCISION AND DRAINAGE HIP Right 2/9/2021    Procedure: HIP INCISION AND DRAINAGE;  Surgeon: Nino Carlson MD;  Location:  [facility];  Service: Orthopedics;  Laterality: Right;   • INCISION AND DRAINAGE LEG Right 10/24/2021    Procedure: INCISION AND DRAINAGE LOWER EXTREMITY;  Surgeon: Amadeo Turner MD;  Location:  [facility];  Service: Plastics;  Laterality: Right;   • INCISION AND DRAINAGE OF WOUND Right 7/8/2021    Procedure: INCISION AND DRAINAGE WOUND RIGHT HIP;  Surgeon: James Huntley MD;  Location:  [facility];  Service: Orthopedics;  Laterality: Right;   • JOINT REPLACEMENT     • KYPHOPLASTY WITH BIOPSY Bilateral 10/26/2021    Procedure: THOARCIC 12 KYPHOPLASTY WITH BIOPSY;  Surgeon: Bandar Shea MD;  Location:  [facility];  Service: Neurosurgery;  Laterality: Bilateral;   • LEG DEBRIDEMENT Right 9/14/2021     "Procedure: DEBRIDEMENT OF RIGHT HIP WOUND, RIGHT GLUTEAL FASCIOCUTANEOUS ADVANCEMENT FLAP AND RIGHT TENSOR FASCIAL JESSICA FLAP;  Surgeon: Amadeo Turner MD;  Location:  [facility];  Service: Plastics;  Laterality: Right;   • LUMBAR DISCECTOMY Right 3/23/2021    Procedure: LUMBAR DISCECTOMY MICRO, Lumbar 1/2 right;  Surgeon: Bandar Shea MD;  Location:  [facility];  Service: Neurosurgery;  Laterality: Right;   • LUMBAR FUSION N/A 1/19/2018    Procedure: L3-4,L4-5 DECOMPRESSION, POSTERIOR SPINAL FUSION WITH INSTRUMENTATION;  Surgeon: Fortino Oropeza MD;  Location:  [facility];  Service:    • LUMBAR LAMINECTOMY WITH FUSION Left 1/17/2018    Procedure: LEFT L3-4 L4-5 LATERAL LUMBAR INTERBODY FUSION;  Surgeon: Fortino Oropeza MD;  Location:  [facility];  Service:    • MYRINGOTOMY W/ TUBES  09/04/2014    TUBES NO LONGER IN PLACE   • OTHER SURGICAL HISTORY      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 so hardware was removed and spacers were placed   • REPLACEMENT TOTAL KNEE Right            Review of Systems      Objective   PHYSICAL EXAM  Vital Signs:   Ht 167.6 cm (66\")   Wt 79.8 kg (176 lb)   BMI 28.41 kg/m²     Physical Exam      DATA  Result Review :                   ASSESSMENT AND PLAN          Problem List Items Addressed This Visit        Genitourinary and Reproductive     Chronic indwelling Horan catheter - Primary (Chronic)   Other Visit Diagnoses     Urge incontinence          I would recommend the following:  -I think removal of the catheter for voiding trial is her best option.  I would have this removed by home health in the early a.m. of the day.  They can check her later that afternoon to see if she is voiding okay.  If not they would need to replace the catheter.  -Her mobility is definitely improving through home health physical therapy.  She is beginning to work on transferring out of bed.  I think her best option as far as voiding will be when she is able to at least set up on a bedside commode " will make it to her toilet.  -My recommendation would then be for her to undergo a suprapubic cystostomy in hopes that she would regain some contractility that would allow her to empty.  It would be much easier to monitor with a suprapubic tube in place.  This would also be easier to manage than a urethral catheter.      FOLLOW UP     No follow-ups on file.        (Please note that portions of this note were completed with a voice recognition program.)  Ky Plascencia MD  03/24/23  07:33 CDT

## 2023-03-14 ENCOUNTER — HOME CARE VISIT (OUTPATIENT)
Dept: HOME HEALTH SERVICES | Facility: CLINIC | Age: 68
End: 2023-03-14
Payer: MEDICARE

## 2023-03-14 VITALS
TEMPERATURE: 97.7 F | HEART RATE: 65 BPM | SYSTOLIC BLOOD PRESSURE: 128 MMHG | DIASTOLIC BLOOD PRESSURE: 70 MMHG | RESPIRATION RATE: 16 BRPM | OXYGEN SATURATION: 95 %

## 2023-03-14 PROCEDURE — G0151 HHCP-SERV OF PT,EA 15 MIN: HCPCS

## 2023-03-14 NOTE — CASE COMMUNICATION
Patient is expecting a visit from you on Thursday 3/16/2023 for a shower, but I don't see any new OT orders.   Please advise.  Thanks.

## 2023-03-16 ENCOUNTER — HOME CARE VISIT (OUTPATIENT)
Dept: HOME HEALTH SERVICES | Facility: CLINIC | Age: 68
End: 2023-03-16
Payer: MEDICARE

## 2023-03-16 VITALS
TEMPERATURE: 97.7 F | OXYGEN SATURATION: 98 % | HEART RATE: 68 BPM | RESPIRATION RATE: 16 BRPM | DIASTOLIC BLOOD PRESSURE: 62 MMHG | SYSTOLIC BLOOD PRESSURE: 120 MMHG

## 2023-03-16 PROCEDURE — G0152 HHCP-SERV OF OT,EA 15 MIN: HCPCS

## 2023-03-16 PROCEDURE — G0151 HHCP-SERV OF PT,EA 15 MIN: HCPCS

## 2023-03-17 PROCEDURE — G0179 MD RECERTIFICATION HHA PT: HCPCS | Performed by: INTERNAL MEDICINE

## 2023-03-19 ENCOUNTER — HOME CARE VISIT (OUTPATIENT)
Dept: HOME HEALTH SERVICES | Facility: HOME HEALTHCARE | Age: 68
End: 2023-03-19
Payer: MEDICARE

## 2023-03-19 ENCOUNTER — LAB REQUISITION (OUTPATIENT)
Dept: LAB | Facility: HOSPITAL | Age: 68
End: 2023-03-19
Payer: MEDICARE

## 2023-03-19 VITALS
DIASTOLIC BLOOD PRESSURE: 68 MMHG | SYSTOLIC BLOOD PRESSURE: 120 MMHG | OXYGEN SATURATION: 98 % | HEART RATE: 68 BPM | TEMPERATURE: 97.7 F | RESPIRATION RATE: 16 BRPM

## 2023-03-19 VITALS
OXYGEN SATURATION: 92 % | HEART RATE: 64 BPM | RESPIRATION RATE: 18 BRPM | DIASTOLIC BLOOD PRESSURE: 78 MMHG | SYSTOLIC BLOOD PRESSURE: 110 MMHG | TEMPERATURE: 97.2 F

## 2023-03-19 DIAGNOSIS — Z00.00 ENCOUNTER FOR GENERAL ADULT MEDICAL EXAMINATION WITHOUT ABNORMAL FINDINGS: ICD-10-CM

## 2023-03-19 PROCEDURE — 87186 SC STD MICRODIL/AGAR DIL: CPT | Performed by: INTERNAL MEDICINE

## 2023-03-19 PROCEDURE — G0299 HHS/HOSPICE OF RN EA 15 MIN: HCPCS

## 2023-03-19 PROCEDURE — 87077 CULTURE AEROBIC IDENTIFY: CPT | Performed by: INTERNAL MEDICINE

## 2023-03-19 PROCEDURE — 87086 URINE CULTURE/COLONY COUNT: CPT | Performed by: INTERNAL MEDICINE

## 2023-03-19 NOTE — HOME HEALTH
3/18/23  called this nurse to phone the patient for concerns about patient,s urine. Called x 3 and left message if patient needed SN to return phone call. Patient returned call approximately 200pm while this SN was traveling from [place] and trhought she might have UTI, patient had event rto go to this pm and request services for in AM. UA with C&S ordered and collected this AM. Urine was cloudy with sediment. Patient otherwise is asymptomatic for UTI. Speciment collected and taken to [facility] lab.

## 2023-03-20 DIAGNOSIS — N39.0 ACUTE UTI: Primary | ICD-10-CM

## 2023-03-20 RX ORDER — CIPROFLOXACIN 500 MG/1
500 TABLET, FILM COATED ORAL 2 TIMES DAILY
Qty: 10 TABLET | Refills: 0 | Status: SHIPPED | OUTPATIENT
Start: 2023-03-20

## 2023-03-21 ENCOUNTER — HOME CARE VISIT (OUTPATIENT)
Dept: HOME HEALTH SERVICES | Facility: CLINIC | Age: 68
End: 2023-03-21
Payer: MEDICARE

## 2023-03-21 VITALS
RESPIRATION RATE: 16 BRPM | OXYGEN SATURATION: 96 % | DIASTOLIC BLOOD PRESSURE: 60 MMHG | HEART RATE: 66 BPM | SYSTOLIC BLOOD PRESSURE: 108 MMHG | TEMPERATURE: 97.4 F

## 2023-03-21 PROCEDURE — G0152 HHCP-SERV OF OT,EA 15 MIN: HCPCS

## 2023-03-22 ENCOUNTER — HOME CARE VISIT (OUTPATIENT)
Dept: HOME HEALTH SERVICES | Facility: CLINIC | Age: 68
End: 2023-03-22
Payer: MEDICARE

## 2023-03-22 VITALS
SYSTOLIC BLOOD PRESSURE: 140 MMHG | RESPIRATION RATE: 20 BRPM | TEMPERATURE: 97.4 F | DIASTOLIC BLOOD PRESSURE: 72 MMHG | HEART RATE: 66 BPM | OXYGEN SATURATION: 96 %

## 2023-03-22 LAB
BACTERIA SPEC AEROBE CULT: ABNORMAL
BACTERIA SPEC AEROBE CULT: ABNORMAL

## 2023-03-22 PROCEDURE — G0157 HHC PT ASSISTANT EA 15: HCPCS

## 2023-03-22 NOTE — HOME HEALTH
COVID SCREENING: no s/s  FOCUS OF CARE/SKILLED NEED: transfers  TEACHING/INTERVENTIONS: transfers, BLE strengthening  PROGRESS TOWARD GOALS: pt is progressing toward goals  PHYSICIAN CONTACT: n/a  INSURANCE CHANGES: no  FALLS SINCE LAST HH VISIT? no  MEDICATION CHANGES SINCE LAST HH VISIT? no  PLAN FOR NEXT VISIT: transfers, gait as able

## 2023-03-23 ENCOUNTER — OFFICE VISIT (OUTPATIENT)
Dept: UROLOGY | Facility: CLINIC | Age: 68
End: 2023-03-23
Payer: MEDICARE

## 2023-03-23 VITALS — WEIGHT: 176 LBS | HEIGHT: 66 IN | BODY MASS INDEX: 28.28 KG/M2

## 2023-03-23 DIAGNOSIS — Z97.8 CHRONIC INDWELLING FOLEY CATHETER: Primary | Chronic | ICD-10-CM

## 2023-03-23 DIAGNOSIS — N39.41 URGE INCONTINENCE: ICD-10-CM

## 2023-03-23 PROCEDURE — 1159F MED LIST DOCD IN RCRD: CPT | Performed by: UROLOGY

## 2023-03-23 PROCEDURE — 99203 OFFICE O/P NEW LOW 30 MIN: CPT | Performed by: UROLOGY

## 2023-03-23 PROCEDURE — 1160F RVW MEDS BY RX/DR IN RCRD: CPT | Performed by: UROLOGY

## 2023-03-24 ENCOUNTER — HOME CARE VISIT (OUTPATIENT)
Dept: HOME HEALTH SERVICES | Facility: CLINIC | Age: 68
End: 2023-03-24
Payer: MEDICARE

## 2023-03-24 VITALS
RESPIRATION RATE: 20 BRPM | SYSTOLIC BLOOD PRESSURE: 140 MMHG | OXYGEN SATURATION: 96 % | DIASTOLIC BLOOD PRESSURE: 72 MMHG | TEMPERATURE: 97.1 F | HEART RATE: 69 BPM

## 2023-03-24 PROCEDURE — G0157 HHC PT ASSISTANT EA 15: HCPCS

## 2023-03-24 NOTE — HOME HEALTH
COVID SCREENING: no s/s  FOCUS OF CARE/SKILLED NEED: transfers, ther ex  TEACHING/INTERVENTIONS: RLE strengthening  PROGRESS TOWARD GOALS: pt is progressing toward goals  PHYSICIAN CONTACT: n/a  INSURANCE CHANGES: no  FALLS SINCE LAST HH VISIT? no  MEDICATION CHANGES SINCE LAST HH VISIT? no  PLAN FOR NEXT VISIT: transfers/steps, ther ex    Pt c/o right foot/toe pain.

## 2023-03-24 NOTE — HOME HEALTH
Covid screen completed    Subjective:Patient states she is a little tired today, but would like to work on a shower.    Falls:none    Medication changes:none    Insurance changes:none    Edema:none    Medical necessity for continued visits: Skilled OT medically necessary to address UB HEP and shower skills.    Next MD appt:pending    Plan for next visit: Plan to address shower skills and CG training.

## 2023-03-28 ENCOUNTER — HOME CARE VISIT (OUTPATIENT)
Dept: HOME HEALTH SERVICES | Facility: HOME HEALTHCARE | Age: 68
End: 2023-03-28
Payer: MEDICARE

## 2023-03-28 ENCOUNTER — HOME CARE VISIT (OUTPATIENT)
Dept: HOME HEALTH SERVICES | Facility: CLINIC | Age: 68
End: 2023-03-28
Payer: MEDICARE

## 2023-03-28 VITALS
OXYGEN SATURATION: 96 % | HEART RATE: 66 BPM | RESPIRATION RATE: 18 BRPM | DIASTOLIC BLOOD PRESSURE: 68 MMHG | SYSTOLIC BLOOD PRESSURE: 128 MMHG | TEMPERATURE: 97.4 F

## 2023-03-28 VITALS
RESPIRATION RATE: 18 BRPM | DIASTOLIC BLOOD PRESSURE: 68 MMHG | OXYGEN SATURATION: 96 % | SYSTOLIC BLOOD PRESSURE: 122 MMHG | HEART RATE: 68 BPM | TEMPERATURE: 97.4 F

## 2023-03-28 VITALS
TEMPERATURE: 97.4 F | OXYGEN SATURATION: 96 % | RESPIRATION RATE: 18 BRPM | HEART RATE: 66 BPM | SYSTOLIC BLOOD PRESSURE: 124 MMHG | DIASTOLIC BLOOD PRESSURE: 68 MMHG

## 2023-03-28 PROCEDURE — G0152 HHCP-SERV OF OT,EA 15 MIN: HCPCS

## 2023-03-28 PROCEDURE — G0299 HHS/HOSPICE OF RN EA 15 MIN: HCPCS

## 2023-03-28 PROCEDURE — G0157 HHC PT ASSISTANT EA 15: HCPCS

## 2023-03-28 NOTE — HOME HEALTH
COVID SCREENING: no s/s  FOCUS OF CARE/SKILLED NEED: transfers, ther ex  TEACHING/INTERVENTIONS: progressing activity  PROGRESS TOWARD GOALS: pt is progressing toward goals  PHYSICIAN CONTACT: n/a  INSURANCE CHANGES: no  FALLS SINCE LAST HH VISIT? no  MEDICATION CHANGES SINCE LAST HH VISIT? no  PLAN FOR NEXT VISIT: progress to gait training    Pt just had a catheter removed.

## 2023-03-28 NOTE — HOME HEALTH
FOCUS OF CARE/SKILLED NEED: Removal of urinary catheter this morning - voiding trail to see if the patient will void.  Order received yesterday from Dr. Plascencia's office.  Patient informed completed the antibiotic cipro five day course for infection from urinalysis and culture obtained previous weekend.  Patient continues on Keflex as well.  Patient denies pain during SNV    TEACHING/INTERVENTIONS: Increase water intake, situated the bedside commode next to the bed, encouraged patient to periodically sit on the commode to try and void throughout the day.  Left work cell phone number for patient to contact me when voiding.  Will follow up later today on status.    PROGRESS TOWARD GOALS: Progressing    PHYSICIAN CONTACT:     INSURANCE CHANGES? Denies    FALLS SINCE LAST VISIT? Denies    MEDICATION CHANGES SINCE LAST VISIT?  Addition of Cipro for urine infection, patient has completed course.  Informed Clinical Quality Nurse Dai Mariano in the office regarding infection.    PLAN FOR NEXT VISIT:  Will be determined pending status of urinary catheter    PRE-SCREENED FOR COVID? Yes, No s/s of COVID. No recent exposure.

## 2023-03-29 ENCOUNTER — HOME CARE VISIT (OUTPATIENT)
Dept: HOME HEALTH SERVICES | Facility: HOME HEALTHCARE | Age: 68
End: 2023-03-29
Payer: MEDICARE

## 2023-03-29 NOTE — HOME HEALTH
Subjective: Covid screen completed Patient here with caregiver. Patient reports she walked this date with PT for the first time.    Falls:none    Medication changes: none    Insurance changes:none    Edema: none    Medical necessity for continued visits: Skilled OT medically necessary to address safety with showers and caregiver education    Next MD appt:pending    Plan for next visit: Plan to address shower with caregiver.

## 2023-03-29 NOTE — CASE COMMUNICATION
Earlier this morning approximately 0815 SNV and removed urinary catheter.     Patient did text when urinated - had 4 to 5 episodes of urination.    Coworker delivered a urine collection to put in the commode to measure urine, and patient is to record amount, and will follow up in the morning.  Patient states does not feel discomfort in the bladder and does not feel full.   Will follow up in the morning.  [facility] occurs otherwise follow up in the morning. Patient verbalized understanding.

## 2023-03-30 ENCOUNTER — HOME CARE VISIT (OUTPATIENT)
Dept: HOME HEALTH SERVICES | Facility: CLINIC | Age: 68
End: 2023-03-30
Payer: MEDICARE

## 2023-03-30 VITALS
TEMPERATURE: 97.3 F | HEART RATE: 67 BPM | RESPIRATION RATE: 16 BRPM | DIASTOLIC BLOOD PRESSURE: 60 MMHG | SYSTOLIC BLOOD PRESSURE: 100 MMHG | OXYGEN SATURATION: 97 %

## 2023-03-30 PROCEDURE — G0151 HHCP-SERV OF PT,EA 15 MIN: HCPCS

## 2023-03-30 NOTE — HOME HEALTH
pre-screened covid 19    Patient was able to ambulate with PTA last visit.  Patient was unable to ambulate this session due to increased fatigue from doing more toilet transfers.  Patient's catheter was removed and patient is doing more toilet transfers than she has adapted too.  Patient to continue with home health physical therapy to progress her transfers and progress to ambulation.

## 2023-03-30 NOTE — CASE COMMUNICATION
At 0820 telephone call to patient - patient states she ambulated six steps yesterday and was tired and sore. Didn't measure urine from before bedtime or first thing this morning when voided.  Patient states she wore a brief to bed last night and saturated the brief, clothes and bed with urine.  Patient denies discomfort.  Will follow up with patient later today - patient states she will measure urine.

## 2023-03-31 ENCOUNTER — TELEPHONE (OUTPATIENT)
Dept: UROLOGY | Facility: CLINIC | Age: 68
End: 2023-03-31
Payer: MEDICARE

## 2023-03-31 ENCOUNTER — TELEPHONE (OUTPATIENT)
Dept: UROLOGY | Facility: CLINIC | Age: 68
End: 2023-03-31

## 2023-03-31 ENCOUNTER — HOME CARE VISIT (OUTPATIENT)
Dept: HOME HEALTH SERVICES | Facility: HOME HEALTHCARE | Age: 68
End: 2023-03-31
Payer: MEDICARE

## 2023-03-31 DIAGNOSIS — N39.41 URGE INCONTINENCE: Primary | ICD-10-CM

## 2023-03-31 RX ORDER — FESOTERODINE FUMARATE 4 MG/1
4 TABLET, EXTENDED RELEASE ORAL
Qty: 30 TABLET | Refills: 11 | Status: SHIPPED | OUTPATIENT
Start: 2023-03-31

## 2023-03-31 NOTE — CASE COMMUNICATION
SNV Tuesday 3/28/23 - 0800 removed indwelling urinary catheter.  Daily check in with patient, who is urinating consistently throughout the day.  Urine collection started on Wednesday 3/29/23 morning - patient urinated 800 ml and had two briefs that were saturated with urine through brief, clothes and bedding - wore briefs at night time.  Intake of fluids approximately 46 ml of fluid.  Thursday into Friday - patient urinated apprxomately  1100 ml of urine and had two briefs saturated with urine - intake of fluids approximately 46-50 ml.  Patient denies discomfort, or feeling full. Denies burning sensation when urinating.  Patient has been working consistently with physical therapy, ambulating six or steps with walker, and yesterday was out in community for appointments.  Patient reported this monring, yesterday after arrived home, exhausted and when transferring from [facility] commode to bed with caregiver assistance - due to exhaustion couldn't hold herself up and caregiver eased patient to the floor to her knees - patient states has a bruise on her knee, and swelling, caregiver is applying ice and elevation. Patient denies increase in pain and has range of motion.  At 0925 Telephone call to Dr. Plascencia's office and left a message of above information regarding urination/catheter removal and left number  to reach with further instructions for SN and patient.  Deepika Adrian

## 2023-03-31 NOTE — TELEPHONE ENCOUNTER
Caller: HANY    Relationship to patient: [facility] PROVIDER    Best call back number: [phone]    Patient is needing: HANY CALLED WITH AN UPDATE ON INTAKE AND OUTPUT SINCE CATH REMOVED ON 03/28/23. HANY WOULD LIKE A CALL BACK TO DISCUSS

## 2023-03-31 NOTE — CASE COMMUNICATION
On 3/30 at 1453 telephone call to patient to obtain information on urination, left message for patient to contact writer.

## 2023-03-31 NOTE — PROGRESS NOTES
I was contacted by home health regarding difficulty with urinary incontinence.  I suspect she has some urge incontinence.  Looking at her formulary I cannot put her on mirabegron or vibegron.  Therefore I am going to use Ailyn Hutton since this is probably the safest anticholinergic with regard to neurologic side effects.  Ky Plascencia MD  3/31/2023  11:56 CDT

## 2023-03-31 NOTE — TELEPHONE ENCOUNTER
Provider: DR MCCAULEY  Caller: AZ GOTTI  Relationship to Patient: HOME HEALTH  Reason for Call: RETURNING IZABELLA'S CALL REGARDING:    AN UPDATE ON INTAKE AND OUTPUT SINCE CATH REMOVED ON 3/28/23.    PLEASE RETURN AZ'S CALL.

## 2023-04-01 ENCOUNTER — APPOINTMENT (OUTPATIENT)
Dept: GENERAL RADIOLOGY | Facility: HOSPITAL | Age: 68
End: 2023-04-01
Payer: MEDICARE

## 2023-04-01 ENCOUNTER — HOSPITAL ENCOUNTER (EMERGENCY)
Facility: HOSPITAL | Age: 68
Discharge: HOME | End: 2023-04-01
Admitting: FAMILY MEDICINE
Payer: MEDICARE

## 2023-04-01 VITALS
BODY MASS INDEX: 28.12 KG/M2 | DIASTOLIC BLOOD PRESSURE: 54 MMHG | WEIGHT: 175 LBS | HEIGHT: 66 IN | TEMPERATURE: 97.7 F | RESPIRATION RATE: 18 BRPM | OXYGEN SATURATION: 97 % | SYSTOLIC BLOOD PRESSURE: 112 MMHG | HEART RATE: 72 BPM
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
## 2023-04-01 DIAGNOSIS — M25.561 ACUTE PAIN OF RIGHT KNEE: Primary | ICD-10-CM

## 2023-04-01 PROCEDURE — 73590 X-RAY EXAM OF LOWER LEG: CPT

## 2023-04-01 PROCEDURE — 73562 X-RAY EXAM OF KNEE 3: CPT

## 2023-04-01 PROCEDURE — 99283 EMERGENCY DEPT VISIT LOW MDM: CPT

## 2023-04-01 RX ORDER — HYDROCODONE BITARTRATE AND ACETAMINOPHEN 5; 325 MG/1; MG/1
1 TABLET ORAL ONCE
Status: COMPLETED | OUTPATIENT
Start: 2023-04-01 | End: 2023-04-01

## 2023-04-01 RX ORDER — HYDROCODONE BITARTRATE AND ACETAMINOPHEN 5; 325 MG/1; MG/1
1 TABLET ORAL EVERY 8 HOURS PRN
Qty: 6 TABLET | Refills: 0 | Status: SHIPPED | OUTPATIENT
Start: 2023-04-01 | End: 2023-04-03

## 2023-04-01 RX ADMIN — HYDROCODONE BITARTRATE AND ACETAMINOPHEN 1 TABLET: 5; 325 TABLET ORAL at 10:45

## 2023-04-01 NOTE — DISCHARGE INSTRUCTIONS
Continue taking tramadol and may take norco as needed for breakthrough pain. Continue applying heat and try to keep leg as mobile as possible. Follow up with primary doctor next week for recheck. Return to ED with any further concerns, symptoms, or as needed.

## 2023-04-01 NOTE — ED PROVIDER NOTES
Subjective   History of Present Illness  Tawny Shin is a 67-year-old female who is wheelchair-bound who presents to ED today for right knee pain.  Patient states that she fell while transferring into her wheelchair 2 days ago and has been having pain in her right knee since.  Patient states that she has had several surgeries on her right knee and has a spacer in her right knee and concern for fracture.  Patient is in wheelchair because she has severe weakness in her right leg from a previous back injury.  States that heat has helped with the pain and she takes tramadol daily but that has not helped with her pain.  Patient denies any changes in sensation, which she does have full sensation in right leg.    History provided by:  Patient   used: No        Review of Systems   Constitutional: Negative for activity change, chills and fever.   Eyes: Negative for visual disturbance.   Respiratory: Negative for cough, chest tightness and shortness of breath.    Cardiovascular: Negative for chest pain and palpitations.   Gastrointestinal: Negative for abdominal distention, abdominal pain, diarrhea, nausea and vomiting.   Genitourinary: Negative for dysuria.   Musculoskeletal: Positive for arthralgias (right knee pain).   Skin: Negative for color change, rash and wound.   Neurological: Negative for dizziness, weakness, numbness and headaches.   Psychiatric/Behavioral: Negative for confusion.       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   • Elevated cholesterol    • Eustachian tube dysfunction    • Heart disease    • Herpes simplex    • History of transfusion    • Hyperlipidemia    • Hypertension    • Hypothyroidism 12/23/2020   • Intrinsic asthma 12/23/2020   • Knee dislocation    • Labral tear of right hip joint    • Laryngitis sicca    • Laryngitis, chronic    • Left carotid bruit 03/09/2016   • MI (myocardial infarction)    • Myalgia due to statin 06/25/2019   • Open wound of right hip 09/14/2021   • Osteomyelitis of right femur 07/06/2021   • Otorrhea    • Pacemaker 11/17/2016   • Primary osteoarthritis of left knee 12/23/2020   • Psoriasis vulgaris 12/23/2020   • S/P coronary artery stent placement 03/09/2016   • Sensorineural hearing loss    • Seropositive rheumatoid arthritis of multiple sites 12/27/2019    Overview:  -myochrysine '93-'96 -methotrexate '96--->11/98;r/s  restarted 2/99--> 8/14 (anemia) -sulfasalazine- not effective -penicillamine 6/98-->10/98; no effect -leflunomide 11/98--> - Humira '13-->didn't take - Enbrel 12/14-->3/15- no effect!   Last Assessment & Plan:  - \"aching all over\" because she had to be off her anti-rheumatic drugs for 2 weeks in preparation for her R knee surgery - he   • Sick sinus syndrome 12/27/2019   • Sjogren's disease    • Spondylolisthesis of lumbar region 01/17/2018   • Syncope, recurrent 02/08/2021   • Urinary tract infection        Allergies   Allergen Reactions   • Atorvastatin Other (See Comments)     LEG CRAMPS     • Amoxicillin Rash   • Escitalopram Rash   • Nabumetone Rash   • Niacin Er Rash   • Penicillin G Rash   • Penicillins Rash   • Simvastatin Rash       Past Surgical History:   Procedure Laterality Date   • A-V CARDIAC PACEMAKER INSERTION  2016   • ATRIAL CARDIAC PACEMAKER INSERTION     • CARDIAC CATHETERIZATION     • CATARACT EXTRACTION     • CERVICAL CORPECTOMY N/A 3/3/2021    Procedure: CERVICAL 6 CORPECTOMY WITH " TITANIUM CAGE WITH NEURO MONITORING;  Surgeon: Bandar Shea MD;  Location:  [facility];  Service: Neurosurgery;  Laterality: N/A;   • COLONOSCOPY  11/08/2011    One fold in the ascending colon which showed ulcer otherwise normal exam   • COLONOSCOPY  11/12/2004    Normal exam repeat in five years   • CORONARY ANGIOPLASTY WITH STENT PLACEMENT      X 2; 2013 & 2014   • ENDOSCOPY  07/10/2014    Normal exam   • FLAP LEG Right 9/14/2021    Procedure: RIGHT GLUTEAL FASCIOCUTANEOUS ADVANCEMENT FLAP AND RIGHT TENSOR FASCIAL JESSICA FLAP;  Surgeon: Amadeo Turner MD;  Location:  [facility];  Service: Plastics;  Laterality: Right;   • HIP ABDUCTION TENOTOMY BILATERAL Right 1/14/2021    Procedure: RIGHT HIP GLUTEUS MEDLUS / MINIMUS REPAIR, POSSIBLE ACHILLES ALLOGRAFT;  Surgeon: Nino Carlson MD;  Location:  [facility];  Service: Orthopedics;  Laterality: Right;   • INCISION AND DRAINAGE ABSCESS Right 6/4/2022    Procedure: INCISION AND DRAINAGE ABSCESS right hip;  Surgeon: Magda Salcido MD;  Location:  [facility];  Service: General;  Laterality: Right;   • INCISION AND DRAINAGE ABSCESS Right 6/10/2022    Procedure: RIGHT HIP INCISION AND DRAINAGE. MD NEEDS 3L VANC IRRIGATION, CURRETTES, DAICANS, KERLEX ROLLS;  Surgeon: Amadeo Turner MD;  Location:  [facility];  Service: Plastics;  Laterality: Right;   • INCISION AND DRAINAGE HIP Right 2/9/2021    Procedure: HIP INCISION AND DRAINAGE;  Surgeon: Nino Carlson MD;  Location:  [facility];  Service: Orthopedics;  Laterality: Right;   • INCISION AND DRAINAGE LEG Right 10/24/2021    Procedure: INCISION AND DRAINAGE LOWER EXTREMITY;  Surgeon: Amadeo Turner MD;  Location:  [facility];  Service: Plastics;  Laterality: Right;   • INCISION AND DRAINAGE OF WOUND Right 7/8/2021    Procedure: INCISION AND DRAINAGE WOUND RIGHT HIP;  Surgeon: James Huntley MD;  Location:  [facility];  Service: Orthopedics;  Laterality: Right;   • JOINT REPLACEMENT     • KYPHOPLASTY WITH BIOPSY  Bilateral 10/26/2021    Procedure: THOARCIC 12 KYPHOPLASTY WITH BIOPSY;  Surgeon: Bandar Shea MD;  Location:  [facility];  Service: Neurosurgery;  Laterality: Bilateral;   • LEG DEBRIDEMENT Right 9/14/2021    Procedure: DEBRIDEMENT OF RIGHT HIP WOUND, RIGHT GLUTEAL FASCIOCUTANEOUS ADVANCEMENT FLAP AND RIGHT TENSOR FASCIAL JESSICA FLAP;  Surgeon: Amadeo Turner MD;  Location:  [facility];  Service: Plastics;  Laterality: Right;   • LUMBAR DISCECTOMY Right 3/23/2021    Procedure: LUMBAR DISCECTOMY MICRO, Lumbar 1/2 right;  Surgeon: Bandar Shea MD;  Location:  [facility];  Service: Neurosurgery;  Laterality: Right;   • LUMBAR FUSION N/A 1/19/2018    Procedure: L3-4,L4-5 DECOMPRESSION, POSTERIOR SPINAL FUSION WITH INSTRUMENTATION;  Surgeon: Fortino Oropeza MD;  Location:  [facility];  Service:    • LUMBAR LAMINECTOMY WITH FUSION Left 1/17/2018    Procedure: LEFT L3-4 L4-5 LATERAL LUMBAR INTERBODY FUSION;  Surgeon: Fortino Oropeza MD;  Location:  [facility];  Service:    • MYRINGOTOMY W/ TUBES  09/04/2014    TUBES NO LONGER IN PLACE   • OTHER SURGICAL HISTORY      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 so hardware was removed and spacers were placed   • REPLACEMENT TOTAL KNEE Right        Family History   Problem Relation Age of Onset   • Cancer Mother         Lung cancer   • Heart disease Father         Doied of heart. Attack       Social History     Socioeconomic History   • Marital status:    Tobacco Use   • Smoking status: Never     Passive exposure: Never   • Smokeless tobacco: Never   Vaping Use   • Vaping Use: Never used   Substance and Sexual Activity   • Alcohol use: No   • Drug use: Never   • Sexual activity: Not Currently     Birth control/protection: Abstinence           Objective   Physical Exam  Vitals and nursing note reviewed.   Constitutional:       General: She is not in acute distress.     Appearance: Normal appearance. She is not ill-appearing or toxic-appearing.   HENT:       Head: Normocephalic and atraumatic.      Nose: Nose normal.   Eyes:      Pupils: Pupils are equal, round, and reactive to light.   Cardiovascular:      Rate and Rhythm: Normal rate and regular rhythm.      Pulses: Normal pulses.      Heart sounds: Normal heart sounds.   Pulmonary:      Effort: Pulmonary effort is normal.      Breath sounds: Normal breath sounds.   Abdominal:      General: Bowel sounds are normal. There is no distension.      Palpations: Abdomen is soft.   Musculoskeletal:         General: Normal range of motion.      Cervical back: Neck supple.      Right knee: Swelling present. No deformity.        Legs:       Comments: Right knee has limited ROM at baseline; mild TTP; mild swelling   Skin:     General: Skin is warm and dry.      Capillary Refill: Capillary refill takes less than 2 seconds.   Neurological:      General: No focal deficit present.      Mental Status: She is alert and oriented to person, place, and time.   Psychiatric:         Mood and Affect: Mood normal.         Behavior: Behavior normal.         Procedures           ED Course                                           Medical Decision Making  In summary, patient presents to ED today for right knee pain after a fall 2 days ago.  On arrival, patient is in electric wheelchair, afebrile, normotensive.  Differential diagnoses include, but not limited to, musculoskeletal pain, contusion, and hardware malalignment.  Evaluation included x-ray right knee and tib-fib and Norco 5 mg tablet for pain.  Physical exam revealed right knee had mild tenderness to palpation with mild swelling on inner knee and bruising on right lower leg.  X-rays reviewed and showed no acute findings.  Discussed these results with patient and advised follow-up next week with her primary doctor for reevaluation.  Patient prescribed Norco to use as needed for breakthrough pain and advised her to keep leg as mobile as possible and continue to apply heat and may also take  Tylenol as needed.  Patient is agreeable with this plan.    Acute pain of right knee: complicated acute illness or injury  Fall, initial encounter: complicated acute illness or injury  Amount and/or Complexity of Data Reviewed  Radiology: ordered. Decision-making details documented in ED Course.      Risk  Prescription drug management.          Final diagnoses:   Acute pain of right knee   Fall, initial encounter       ED Disposition  ED Disposition     ED Disposition   Discharge    Condition   Stable    Comment   --             Moises Oliveira MD  [address] [place] [address]  [phone]    Schedule an appointment as soon as possible for a visit in 1 week  for recheck         Medication List      Changed    * HYDROcodone-acetaminophen 7.5-325 MG per tablet  Commonly known as: NORCO  Take 1 tablet by mouth Every 8 (Eight) Hours As Needed for Moderate Pain .  What changed: Another medication with the same name was added. Make sure you understand how and when to take each.     * HYDROcodone-acetaminophen 5-325 MG per tablet  Commonly known as: NORCO  Take 1 tablet by mouth Every 8 (Eight) Hours As Needed for Severe Pain or Moderate Pain for up to 2 days.  What changed: You were already taking a medication with the same name, and this prescription was added. Make sure you understand how and when to take each.     predniSONE 5 MG tablet  Commonly known as: DELTASONE  Take 1 tablet by mouth Daily. Indications: Acute Bursitis  What changed:   · how much to take  · when to take this         * This list has 2 medication(s) that are the same as other medications prescribed for you. Read the directions carefully, and ask your doctor or other care provider to review them with you.               Where to Get Your Medications      These medications were sent to [facility] - [place] - [address] AT [facility] - [phone] [facility] [phone]   [address] [place] [address]    Phone: [phone]    · HYDROcodone-acetaminophen 5-325 MG per tablet          Naya Chaidez, APRN  04/01/23 [id number]

## 2023-04-05 ENCOUNTER — HOME CARE VISIT (OUTPATIENT)
Dept: HOME HEALTH SERVICES | Facility: CLINIC | Age: 68
End: 2023-04-05
Payer: MEDICARE

## 2023-04-05 VITALS
OXYGEN SATURATION: 96 % | TEMPERATURE: 97.2 F | RESPIRATION RATE: 16 BRPM | DIASTOLIC BLOOD PRESSURE: 68 MMHG | HEART RATE: 84 BPM | SYSTOLIC BLOOD PRESSURE: 122 MMHG

## 2023-04-05 PROCEDURE — G0152 HHCP-SERV OF OT,EA 15 MIN: HCPCS

## 2023-04-05 PROCEDURE — G0157 HHC PT ASSISTANT EA 15: HCPCS

## 2023-04-05 NOTE — HOME HEALTH
SUBJECTIVE: Patient states she had a fall last week while transferring and landed on her knees. She reports pain in right knee due to fall.    OBJECTIVE: Fall report taken by Skye Verdin OT. Patient had electric WC stuck in doorway when therapist arrived and required therapist assistance to get WC undwedged. Patient declines to transfer today or stand due pain in right knee from fall last week and due to just getting in/out of shower with OT prior to PT visit. Patient performs seated hip flexion, AP's, and laq easily x15 on left but is very limited on right due to pain and swelling. She reports x-rays were negative for breaks after fall last week.    MEDICATION CHANGES: none    FALLS SINCE LAST VISIT: none    CHANGES TO INSURANCE: none    MENTAL STATUS: alert and oriented    PAIN: 6/10    EDEMA: 1+ non pitting B ankles feet and ankles    SUPPLEMENTAL OXYGEN: no     PLAN: Attempt standing/gait next visit.

## 2023-04-06 NOTE — HOME HEALTH
Covid screen completed    Subjective: Patient reports soreness from fall.    Falls:Patient reports fall on Thursday 30th around 5 pm. Patient was transferring to [facility] from power wheelchair with caregiver. Patient reports R knee gave out and caregiver had to assist her onto the floor onto knees. Patient has bruising and swelling on shin and states she went to ER on Saturday for x-ray with result of no broken bones. Patient education on using sliding board until she builds her strength back. Patient verbalized understanding.    Medication changes: none    Insurance changes:none    Edema: R knee and shin    Medical necessity for continued visits: Skilled OT medically necessary to further address strength and ADL's due to recent set back.    Next MD appt:pending    Plan for next visit: Plan to further address shower safety with caregiver.

## 2023-04-07 ENCOUNTER — HOME CARE VISIT (OUTPATIENT)
Dept: HOME HEALTH SERVICES | Facility: CLINIC | Age: 68
End: 2023-04-07
Payer: MEDICARE

## 2023-04-07 VITALS
TEMPERATURE: 97.9 F | RESPIRATION RATE: 18 BRPM | SYSTOLIC BLOOD PRESSURE: 139 MMHG | DIASTOLIC BLOOD PRESSURE: 80 MMHG | HEART RATE: 69 BPM | OXYGEN SATURATION: 95 %

## 2023-04-07 PROCEDURE — G0157 HHC PT ASSISTANT EA 15: HCPCS

## 2023-04-09 VITALS
HEART RATE: 84 BPM | RESPIRATION RATE: 16 BRPM | DIASTOLIC BLOOD PRESSURE: 68 MMHG | TEMPERATURE: 97.2 F | OXYGEN SATURATION: 96 % | SYSTOLIC BLOOD PRESSURE: 122 MMHG

## 2023-04-10 ENCOUNTER — HOME CARE VISIT (OUTPATIENT)
Dept: HOME HEALTH SERVICES | Facility: CLINIC | Age: 68
End: 2023-04-10
Payer: MEDICARE

## 2023-04-10 VITALS
HEART RATE: 72 BPM | TEMPERATURE: 97.9 F | SYSTOLIC BLOOD PRESSURE: 122 MMHG | DIASTOLIC BLOOD PRESSURE: 62 MMHG | OXYGEN SATURATION: 95 % | RESPIRATION RATE: 16 BRPM

## 2023-04-10 PROCEDURE — G0157 HHC PT ASSISTANT EA 15: HCPCS

## 2023-04-10 NOTE — HOME HEALTH
SUBJECTIVE: Patient states her knee is feeling better but she is still having pain from the knee down the shin.     MEDICATION CHANGES: none    FALLS SINCE LAST VISIT: none    CHANGES TO INSURANCE: none    MENTAL STATUS: alert and oriented    PAIN: right shin 4/10    EDEMA: RLE 2+ edeima foot/ankle/leg    SUPPLEMENTAL OXYGEN: NO    PLAN: Continue with focus on gait/standing/transfers

## 2023-04-12 ENCOUNTER — HOME CARE VISIT (OUTPATIENT)
Dept: HOME HEALTH SERVICES | Facility: CLINIC | Age: 68
End: 2023-04-12
Payer: MEDICARE

## 2023-04-12 VITALS
TEMPERATURE: 98.4 F | OXYGEN SATURATION: 95 % | RESPIRATION RATE: 16 BRPM | SYSTOLIC BLOOD PRESSURE: 138 MMHG | HEART RATE: 71 BPM | DIASTOLIC BLOOD PRESSURE: 78 MMHG

## 2023-04-12 PROCEDURE — G0157 HHC PT ASSISTANT EA 15: HCPCS

## 2023-04-12 PROCEDURE — G0152 HHCP-SERV OF OT,EA 15 MIN: HCPCS

## 2023-04-12 NOTE — HOME HEALTH
SUBJECTIVE: Patient says her knee is hurting but she can get relief if she elevates her leg.     OBJECTIVE: Patient has max difficulty with standing, stepping and transferring due to pain in right leg, weakness and fatigue.     MEDICATION CHANGES: none    FALLS SINCE LAST VISIT: none    CHANGES TO INSURANCE: none    MENTAL STATUS: alert and oriented    PAIN: 4/10 right shin pain    EDEMA: 2+ Left shin/ankle/foot    SUPPLEMENTAL OXYGEN:no    PLAN: Continue with focus on strength and gait.

## 2023-04-13 NOTE — HOME HEALTH
Covid and monkeypox screen completed    Subjective: Patient here with caregiver Silvia this date. Cate, patient's regular weekday caregiver is off.    Falls:none    Medication changes:none    Insurance changes:none    Edema:slight R LE, but improved since last visit    Medical necessity for continued visits: Skilled OT medically necessary short term to insure caregiver independence with shower safety.    Next MD appt:patient is uncertain.    Plan for next visit: Shower skills.

## 2023-04-14 VITALS
RESPIRATION RATE: 18 BRPM | OXYGEN SATURATION: 94 % | DIASTOLIC BLOOD PRESSURE: 75 MMHG | TEMPERATURE: 98.3 F | SYSTOLIC BLOOD PRESSURE: 141 MMHG | HEART RATE: 74 BPM

## 2023-04-15 ENCOUNTER — HOME CARE VISIT (OUTPATIENT)
Dept: HOME HEALTH SERVICES | Facility: CLINIC | Age: 68
End: 2023-04-15
Payer: MEDICARE

## 2023-04-15 VITALS
DIASTOLIC BLOOD PRESSURE: 70 MMHG | OXYGEN SATURATION: 97 % | TEMPERATURE: 97.7 F | HEART RATE: 67 BPM | RESPIRATION RATE: 16 BRPM | SYSTOLIC BLOOD PRESSURE: 121 MMHG

## 2023-04-15 PROCEDURE — G0299 HHS/HOSPICE OF RN EA 15 MIN: HCPCS

## 2023-04-15 NOTE — HOME HEALTH
COVID SCREENIG : patient denies any s/s of covid    FOCUS OF CARE/SKILLED NEED :fall prevention and medication     TEACHING/INTERVENTIONS: educated patient /caregiver on fall  and bleeding precautions, educated patient and caregiver on pressure sore prevention, took photo of scabbed over area to left  upper posterior back that patient said started out as a pimple ,    PATIENT/CAREGIVER RESPONSE :patient verbalizes understanding     PROGRESS TOWARD GOALS:ongoing and progressing     PHYSICAN CONTACT: no    FALLS SINCE LAST VISIT :no    MEDICATION CHANGE SINCE LAST VISIT: no    PLAN FOR NEXT VISIT: continue fall prevention and assess area on left upper back

## 2023-04-17 ENCOUNTER — HOME CARE VISIT (OUTPATIENT)
Dept: HOME HEALTH SERVICES | Facility: CLINIC | Age: 68
End: 2023-04-17
Payer: MEDICARE

## 2023-04-17 VITALS
SYSTOLIC BLOOD PRESSURE: 130 MMHG | DIASTOLIC BLOOD PRESSURE: 68 MMHG | HEART RATE: 66 BPM | TEMPERATURE: 96.4 F | RESPIRATION RATE: 18 BRPM | OXYGEN SATURATION: 95 %

## 2023-04-17 PROCEDURE — G0157 HHC PT ASSISTANT EA 15: HCPCS

## 2023-04-17 NOTE — CASE COMMUNICATION
Patient continues with swelling in right LE/foot/ankle but it is improving since last visit. She continues with pain and weakness that is limiting her with walking and with transfers. She would benefit from continuation of PT in order to addess these deficits so she can return to PLOF.

## 2023-04-17 NOTE — HOME HEALTH
SUBJECTIVE: Patient reports pain continues in right leg. She rates pain a 4/10. Patient reports pain patches.    OBJECTIVE: Patient continues with swelling in right LE/foot/ankle but it is improving since last visit. She continues with pain and weakness that is limiting her with walking and with transfers. She would benefit from continuation of PT in order to addess these deficits so she can return to PLOF.     MEDICATION CHANGES: none    FALLS SINCE LAST VISIT: none    CHANGES TO INSURANCE: none    MENTAL STATUS: alert and oriented    PAIN: 4/10    EDEMA:    WOUND / SKIN CONDITION: INTACT    SUPPLEMENTAL OXYGEN: NO    PLAN: Reassess next visit

## 2023-04-18 ENCOUNTER — OFFICE VISIT (OUTPATIENT)
Dept: INTERNAL MEDICINE | Facility: CLINIC | Age: 68
End: 2023-04-18
Payer: MEDICARE

## 2023-04-18 VITALS
HEIGHT: 66 IN | OXYGEN SATURATION: 98 % | TEMPERATURE: 97.8 F | WEIGHT: 178 LBS | BODY MASS INDEX: 28.61 KG/M2 | DIASTOLIC BLOOD PRESSURE: 68 MMHG | HEART RATE: 80 BPM | SYSTOLIC BLOOD PRESSURE: 116 MMHG

## 2023-04-18 DIAGNOSIS — D48.5 NEOPLASM OF UNCERTAIN BEHAVIOR OF SKIN OF BACK: ICD-10-CM

## 2023-04-18 DIAGNOSIS — F44.4 FUNCTIONAL NEUROLOGICAL SYMPTOM DISORDER WITH WEAKNESS OR PARALYSIS: ICD-10-CM

## 2023-04-18 DIAGNOSIS — I10 ESSENTIAL HYPERTENSION: Chronic | ICD-10-CM

## 2023-04-18 DIAGNOSIS — I87.2 VENOUS INSUFFICIENCY: Chronic | ICD-10-CM

## 2023-04-18 DIAGNOSIS — Z78.0 POST-MENOPAUSAL: ICD-10-CM

## 2023-04-18 DIAGNOSIS — C44.519 BASAL CELL CARCINOMA (BCC) OF SKIN OF OTHER PART OF TORSO: ICD-10-CM

## 2023-04-18 DIAGNOSIS — E66.3 OVERWEIGHT (BMI 25.0-29.9): ICD-10-CM

## 2023-04-18 DIAGNOSIS — Z12.31 SCREENING MAMMOGRAM, ENCOUNTER FOR: Primary | ICD-10-CM

## 2023-04-18 RX ORDER — LIDOCAINE HYDROCHLORIDE 10 MG/ML
1.5 INJECTION, SOLUTION INFILTRATION; PERINEURAL ONCE
Status: COMPLETED | OUTPATIENT
Start: 2023-04-18 | End: 2023-04-18

## 2023-04-18 RX ADMIN — LIDOCAINE HYDROCHLORIDE 1.5 ML: 10 INJECTION, SOLUTION INFILTRATION; PERINEURAL at 15:29

## 2023-04-18 NOTE — PROGRESS NOTES
"      Chief Complaint  Hypertension (3 month follow up ), other (Discuss colon cancer screening ), spot on back (Left  side of back- sore..  painful  when she leans back ), and right leg (Fell 3-4 weeks ago and now right let is red and bruised )    Subjective        Tawny Shin presents to [facility]    HPI  Patient overall doing well.  Patient is noted to have some right lower extremity pain.  He had a fall sometime back fracture.  Not hold herself up her legs backwards.  Greatly improved.  Track down her leg.  Touch.  Not looking infected.  Patient has been able to get her Horan catheter out.  Patient continues to have some issues with urinary incontinence.  Her and her caregiver are very much staying on top of making sure that she does not sit in her urine for long.  I discussed with her that we really need to try to avoid urinary tract infections and skin breakdown.  They are checking about every 2 hours to see if her pad is wet.  Patient does have a lesion on her back it is very problematic and bothersome to her.  It has rolled borders with a keratin plug.  We will biopsy it size it, and send it for pathology.  It has the appearance similar to a basal cell carcinoma.  She also has some lesions on the top of her scalp, is able to scratch these off.  They appear to be actinic keratosis.  We will hold on freezing or doing any other lesion removal to avoid causing a bald spot.      Review of Systems    Objective   Vital Signs:  /68 (BP Location: Left arm, Patient Position: Sitting, Cuff Size: Adult)   Pulse 80   Temp 97.8 °F (36.6 °C) (Temporal)   Ht 167.6 cm (66\")   Wt 80.7 kg (178 lb) Comment: per pt  SpO2 98%   BMI 28.73 kg/m²   Estimated body mass index is 28.73 kg/m² as calculated from the following:    Height as of this encounter: 167.6 cm (66\").    Weight as of this encounter: 80.7 kg (178 lb).      Physical Exam  Vitals reviewed.   Constitutional:       " Appearance: She is not ill-appearing.   HENT:      Head: Normocephalic and atraumatic.      Mouth/Throat:      Mouth: Mucous membranes are dry.      Pharynx: Oropharynx is clear.   Eyes:      General: No scleral icterus.     Conjunctiva/sclera: Conjunctivae normal.   Cardiovascular:      Rate and Rhythm: Normal rate and regular rhythm.   Pulmonary:      Effort: Pulmonary effort is normal. No respiratory distress.   Musculoskeletal:      Right lower leg: Edema present.      Left lower leg: Edema present.   Skin:     Findings: Erythema (right lower ) present.          Neurological:      General: No focal deficit present.      Mental Status: She is alert and oriented to person, place, and time.   Psychiatric:         Mood and Affect: Mood normal.                     Assessment and Plan   Diagnoses and all orders for this visit:    1. Screening mammogram, encounter for (Primary)  -     Mammo Screening Digital Tomosynthesis Bilateral With CAD; Future    2. Post-menopausal  -     DEXA Bone Density Axial; Future    3. Neoplasm of uncertain behavior of skin of back  -     Reference Histopathology  -     lidocaine (XYLOCAINE) 1 % injection 1.5 mL  -     Skin Excision    4. Basal cell carcinoma (BCC) of skin of other part of torso    5. Essential hypertension    6. Venous insufficiency    7. Overweight (BMI 25.0-29.9)    8. Functional neurological symptom disorder with weakness or paralysis      Horan removed.  Patient doing well.  Patient has some incontinence.  They have been very good about checking her.    Suspect that the neoplasm on her back is most likely a basal cell carcinoma.  However there are other things that it could be.  We can send it for reference histopathology.  Patient tolerated procedure well.  Went over dressing changes.    Patient continues to work with physical therapy on her lower extremity weakness and paralysis.  Patient is doing a little bit better, but she is not able to do as much as she was  previously due to her recent fall.  She had an x-ray that was noted to be normal.    Patient has some lower extremity venous insufficiency, elevate extremities when able.    Patient's blood pressure is much better than it had been previously.  We have made some small blood pressure medication adjustments between visits.  He is doing much better.    Result Review :   The following data was reviewed by: Moises Oliveira MD on 04/18/2023:  CMP        7/18/2022    11:00 8/15/2022    12:00 12/7/2022    14:09   CMP   Glucose 108   107   175     BUN 24   50   43     Creatinine 0.52   0.73   1.26     EGFR 100.7   89.2      Sodium 139   140   140     Potassium 4.6   4.3   3.9     Chloride 104   105   103     Calcium 9.0   9.1   8.6     Total Protein   7.2     Total Protein 7.4       Albumin 2.90    3.80     Globulin   3.4     Globulin 4.5       Total Bilirubin 0.3    <0.2     Alkaline Phosphatase 158    98     AST (SGOT) 31    12     ALT (SGPT) 7    12     Albumin/Globulin Ratio 0.6       BUN/Creatinine Ratio 46.2   68.5   34.1     Anion Gap 7.0   9.0        CBC w/diff        7/15/2022    13:40 7/18/2022    11:00 12/7/2022    14:09   CBC w/Diff   WBC 5.52   4.39   4.61     RBC 3.20   3.37   3.85     Hemoglobin 8.2   8.6   11.1     Hematocrit 27.6   30.0   35.1     MCV 86.3   89.0   91.2     MCH 25.6   25.5   28.8     MCHC 29.7   28.7   31.6     RDW 16.9   16.9   15.8     Platelets 242   237   195     Neutrophil Rel % 54.4   54.2   68.9     Immature Granulocyte Rel % 0.2   0.2      Lymphocyte Rel % 29.0   28.5   22.8     Monocyte Rel % 10.5   8.9   5.9     Eosinophil Rel % 5.4   7.5   1.5     Basophil Rel % 0.5   0.7   0.7       Lipid Panel        12/7/2022    14:09   Lipid Panel   Total Cholesterol 243     Triglycerides 95     HDL Cholesterol 58     VLDL Cholesterol 17     LDL Cholesterol  168       TSH        6/3/2022    22:29 12/7/2022    14:09   TSH   TSH 3.300   1.330               Urine Culture        12/12/2022     01:00 1/18/2023    15:30 3/19/2023    09:45   Urine Culture   Urine Culture >100,000 CFU/mL Pseudomonas aeruginosa MDRO   >100,000 CFU/mL Mixed Selina Isolated   >100,000 CFU/mL Enterobacter cloacae complex      >100,000 CFU/mL Pseudomonas aeruginosa MDRO                       Follow Up   Return in about 3 months (around 7/18/2023), or if symptoms worsen or fail to improve, for Video visit, Recheck.  Patient was given instructions and counseling regarding her condition or for health maintenance advice. Please see specific information pulled into the AVS if appropriate.       PHILL Oliveira MD, FACP, FHM      Electronically signed by Moises Oliveira MD, 04/18/23, 9:59 PM CDT.             Skin Excision    Date/Time: 4/18/2023 9:59 PM  Performed by: Moises Oliveira MD  Authorized by: Moises Oliveira MD     Consent:     Consent obtained:  Verbal    Consent given by:  Patient    Risks discussed:  Bleeding, infection, pain and poor cosmetic result    Alternatives discussed:  No treatment  Pre-procedure details:     Preparation: Patient was prepped and draped in usual sterile fashion    Anesthesia:     Anesthesia method:  Local infiltration    Local anesthetic:  Lidocaine 1% w/o epi  Procedure details:     Body Area:  Back    Trunk Location:  Upper back    Malignancy: malignant lesion      Malignancy comment:  Suspected basal cell carcinoma    Initial Size:  2    Final defect size (mm):  0  Post-procedure details:     Patient tolerance of procedure:  Tolerated well, no immediate complications  Comments:      Infiltrated with lidocaine  Used a dermablader and shaved off lesion.  Sent for histopathology

## 2023-04-19 ENCOUNTER — HOME CARE VISIT (OUTPATIENT)
Dept: HOME HEALTH SERVICES | Facility: CLINIC | Age: 68
End: 2023-04-19
Payer: MEDICARE

## 2023-04-19 VITALS
SYSTOLIC BLOOD PRESSURE: 128 MMHG | RESPIRATION RATE: 16 BRPM | OXYGEN SATURATION: 94 % | TEMPERATURE: 97.4 F | HEART RATE: 74 BPM | DIASTOLIC BLOOD PRESSURE: 70 MMHG

## 2023-04-19 PROBLEM — L03.012 PARONYCHIA OF LEFT INDEX FINGER: Status: RESOLVED | Noted: 2022-12-07 | Resolved: 2023-04-19

## 2023-04-19 PROBLEM — C44.91 BASAL CELL CARCINOMA: Status: ACTIVE | Noted: 2023-04-19

## 2023-04-19 PROBLEM — Z97.8 CHRONIC INDWELLING FOLEY CATHETER: Chronic | Status: RESOLVED | Noted: 2022-06-04 | Resolved: 2023-04-19

## 2023-04-19 PROCEDURE — G0152 HHCP-SERV OF OT,EA 15 MIN: HCPCS

## 2023-04-19 NOTE — HOME HEALTH
Subjective: Covid and monkey pox screen completed. Patient here with caregiver this date. Patient states she and caregiver completed a shower on their own a few days ago and it went well.    Falls: none    Medication changes: none    Insurance changes:none    Edema: none    Medical necessity for continued visits: Skilled OT for one more visit to address goals and d/c    Next MD appt: today at 1:30     Plan for next visit: Plan to address ADL's and d/c.

## 2023-04-20 ENCOUNTER — HOME CARE VISIT (OUTPATIENT)
Dept: HOME HEALTH SERVICES | Facility: CLINIC | Age: 68
End: 2023-04-20
Payer: MEDICARE

## 2023-04-20 VITALS
DIASTOLIC BLOOD PRESSURE: 60 MMHG | SYSTOLIC BLOOD PRESSURE: 122 MMHG | RESPIRATION RATE: 16 BRPM | HEART RATE: 72 BPM | TEMPERATURE: 96.4 F | OXYGEN SATURATION: 94 %

## 2023-04-20 PROCEDURE — G0151 HHCP-SERV OF PT,EA 15 MIN: HCPCS

## 2023-04-20 NOTE — HOME HEALTH
pre-screened covid 19    Patient reports she fell about 3 weeks ago and she hasn't been about to take any steps since her fall due to bilateral knee pain and swelling.  Patient reports no fractures with X-rays right knee and right ankle.  Patient request to continue with home health physical therapy. Patient reports 6/10 right ankle pain with standing today.  No right knee pain and no LLE pain.  Patient was unable to tolerate more than 2-3 seconds of standing for 3x today due to her right ankle pain.

## 2023-04-21 LAB
DX ICD CODE: NORMAL
DX ICD CODE: NORMAL
PATH REPORT.FINAL DX SPEC: NORMAL
PATH REPORT.GROSS SPEC: NORMAL
PATH REPORT.SITE OF ORIGIN SPEC: NORMAL
PATHOLOGIST NAME: NORMAL
PAYMENT PROCEDURE: NORMAL

## 2023-04-24 ENCOUNTER — HOME CARE VISIT (OUTPATIENT)
Dept: HOME HEALTH SERVICES | Facility: CLINIC | Age: 68
End: 2023-04-24
Payer: MEDICARE

## 2023-04-24 VITALS
TEMPERATURE: 98.2 F | RESPIRATION RATE: 18 BRPM | DIASTOLIC BLOOD PRESSURE: 70 MMHG | HEART RATE: 70 BPM | OXYGEN SATURATION: 96 % | SYSTOLIC BLOOD PRESSURE: 118 MMHG

## 2023-04-24 PROCEDURE — G0157 HHC PT ASSISTANT EA 15: HCPCS

## 2023-04-24 NOTE — HOME HEALTH
SUBJECTIVE: Patient reports continued pain in right knee and ankle. She describes pain a as throbbing pain. She reports she is performing exercises in bed as instructed and notes inc. pain since this morning after exercising.     MEDICATION CHANGES: none    FALLS SINCE LAST VISIT: none    CHANGES TO INSURANCE: none    MENTAL STATUS: alert and oriented    PAIN:4-5/10    EDEMA: 1+ non pitting, rle    SUPPLEMENTAL OXYGEN: no    PLAN: continue with focus on weight shifting and standing tolerance.

## 2023-04-26 ENCOUNTER — HOME CARE VISIT (OUTPATIENT)
Dept: HOME HEALTH SERVICES | Facility: CLINIC | Age: 68
End: 2023-04-26
Payer: MEDICARE

## 2023-04-26 VITALS
SYSTOLIC BLOOD PRESSURE: 140 MMHG | OXYGEN SATURATION: 94 % | DIASTOLIC BLOOD PRESSURE: 70 MMHG | RESPIRATION RATE: 16 BRPM | TEMPERATURE: 96 F | HEART RATE: 76 BPM

## 2023-04-26 VITALS
RESPIRATION RATE: 16 BRPM | OXYGEN SATURATION: 96 % | TEMPERATURE: 96 F | SYSTOLIC BLOOD PRESSURE: 140 MMHG | DIASTOLIC BLOOD PRESSURE: 70 MMHG | HEART RATE: 72 BPM

## 2023-04-26 PROCEDURE — G0151 HHCP-SERV OF PT,EA 15 MIN: HCPCS

## 2023-04-26 PROCEDURE — G0152 HHCP-SERV OF OT,EA 15 MIN: HCPCS

## 2023-04-27 NOTE — HOME HEALTH
Subjective: Covid and monkeypox screen completed. Patient ready for d/c.    Falls: none    Medication changes: none    Insurance changes: none    Edema: none    Medical necessity for continued visits: D/C this date    Next MD appt: pending

## 2023-05-01 ENCOUNTER — HOME CARE VISIT (OUTPATIENT)
Dept: HOME HEALTH SERVICES | Facility: CLINIC | Age: 68
End: 2023-05-01
Payer: MEDICARE

## 2023-05-01 ENCOUNTER — HOME CARE VISIT (OUTPATIENT)
Dept: HOME HEALTH SERVICES | Facility: HOME HEALTHCARE | Age: 68
End: 2023-05-01
Payer: MEDICARE

## 2023-05-03 ENCOUNTER — HOME CARE VISIT (OUTPATIENT)
Dept: HOME HEALTH SERVICES | Facility: CLINIC | Age: 68
End: 2023-05-03
Payer: MEDICARE

## 2023-05-03 VITALS
DIASTOLIC BLOOD PRESSURE: 80 MMHG | OXYGEN SATURATION: 94 % | HEART RATE: 80 BPM | TEMPERATURE: 97.3 F | SYSTOLIC BLOOD PRESSURE: 140 MMHG | RESPIRATION RATE: 18 BRPM

## 2023-05-03 PROCEDURE — G0151 HHCP-SERV OF PT,EA 15 MIN: HCPCS

## 2023-05-03 NOTE — CASE COMMUNICATION
Patient discharged from home health services due to max rehab potential met for home health.  Patient to start outpatient physical therapy.

## 2023-05-05 ENCOUNTER — HOME CARE VISIT (OUTPATIENT)
Dept: HOME HEALTH SERVICES | Facility: HOME HEALTHCARE | Age: 68
End: 2023-05-05
Payer: MEDICARE

## 2023-05-05 ENCOUNTER — TELEPHONE (OUTPATIENT)
Dept: INTERNAL MEDICINE | Facility: CLINIC | Age: 68
End: 2023-05-05

## 2023-05-05 NOTE — TELEPHONE ENCOUNTER
Caller: [place], Tawny Xiang    Relationship: Self    Best call back number: [phone]    What is the best time to reach you: ANYTIME    Who are you requesting to speak with (clinical staff, provider,  specific staff member): CLINICAL    What was the call regarding: PATIENT IS CALLING TO LET US KNOW THAT [employer] IS GOING TO BE FAXING US SOME PAPERWORK FOR HER TO BE ABLE TO GET AN ELECTRIC WHEEL CHAIR. SHE STATES IT WILL BE COMING FROM [facility]. PATIENT STATES SHE WILL NEED TO BE SEEN AFTER THE PAPERWORK IS FILLED OUT BY DR CANNON. PLEASE CALL BACK TO SCHEDULE WHEN THIS PAPERWORK HAS BEEN FILLED OUT.     Do you require a callback: YES TO SCHEDULE.

## 2023-05-05 NOTE — TELEPHONE ENCOUNTER
Caller: JENNIFER POWELL    Relationship: [facility]    Best call back number: [phone]    Who are you requesting to speak with       CLINICAL STAFF    Do you know the name of the person who called:     JENNIFER    What was the call regarding:     PATIENT DISCHARGED FROM [facility] ON Wednesday 05.03.23.    PATIENT PHYSICAL THERAPY WITH ORDER TO [facility] LOCATION    Do you require a callback:     NO

## 2023-05-05 NOTE — CASE COMMUNICATION
I spoke with Dr. Oliveira's office and requested orders for outpatient physical therapy for the [facility] per patient request.

## 2023-05-08 NOTE — CASE COMMUNICATION
PT visit missed due to being unable to reach patient. LM at 12:20 on 5/1/23. Did not receive return call to schedule visit.

## 2023-05-10 ENCOUNTER — OFFICE VISIT (OUTPATIENT)
Dept: INTERNAL MEDICINE | Facility: CLINIC | Age: 68
End: 2023-05-10
Payer: MEDICARE

## 2023-05-10 VITALS
SYSTOLIC BLOOD PRESSURE: 120 MMHG | TEMPERATURE: 97.6 F | WEIGHT: 178 LBS | HEIGHT: 66 IN | HEART RATE: 78 BPM | DIASTOLIC BLOOD PRESSURE: 70 MMHG | OXYGEN SATURATION: 98 % | BODY MASS INDEX: 28.61 KG/M2

## 2023-05-10 DIAGNOSIS — E66.3 OVERWEIGHT (BMI 25.0-29.9): ICD-10-CM

## 2023-05-10 DIAGNOSIS — M80.00XA OSTEOPOROSIS WITH CURRENT PATHOLOGICAL FRACTURE, UNSPECIFIED OSTEOPOROSIS TYPE, INITIAL ENCOUNTER: ICD-10-CM

## 2023-05-10 DIAGNOSIS — G82.20 LOWER PARAPLEGIA: ICD-10-CM

## 2023-05-10 DIAGNOSIS — S22.080A T12 COMPRESSION FRACTURE, INITIAL ENCOUNTER: ICD-10-CM

## 2023-05-10 DIAGNOSIS — G89.4 CHRONIC PAIN SYNDROME: ICD-10-CM

## 2023-05-10 DIAGNOSIS — I87.2 VENOUS INSUFFICIENCY: Chronic | ICD-10-CM

## 2023-05-10 DIAGNOSIS — F44.4 FUNCTIONAL NEUROLOGICAL SYMPTOM DISORDER WITH WEAKNESS OR PARALYSIS: ICD-10-CM

## 2023-05-10 DIAGNOSIS — M06.9 RHEUMATOID ARTHRITIS FLARE: Primary | ICD-10-CM

## 2023-05-10 DIAGNOSIS — M06.9 RHEUMATOID ARTHRITIS INVOLVING MULTIPLE SITES, UNSPECIFIED WHETHER RHEUMATOID FACTOR PRESENT: ICD-10-CM

## 2023-05-10 RX ORDER — PREDNISONE 5 MG/1
TABLET ORAL
Qty: 27 TABLET | Refills: 0 | Status: SHIPPED | OUTPATIENT
Start: 2023-05-10 | End: 2023-05-19

## 2023-05-10 NOTE — PROGRESS NOTES
"      Chief Complaint  visit to [employer] wheel chair  (Needs to be main discussion for today's appointment ), legs are hard and feet are swelling , and other (Person that stays the night with her states she has a funny sound at night when breathing while sleeping )    Subjective        Tawny Shin presents to [facility]    HPI  Patient here for above issues.  Patient known to me for several years.  Functional capacity has declined over last several years.     Review of Systems    Objective   Vital Signs:  /70 (BP Location: Left arm, Patient Position: Sitting, Cuff Size: Adult)   Pulse 78   Temp 97.6 °F (36.4 °C) (Temporal)   Ht 167.6 cm (66\")   Wt 80.7 kg (178 lb)   SpO2 98%   BMI 28.73 kg/m²   Estimated body mass index is 28.73 kg/m² as calculated from the following:    Height as of this encounter: 167.6 cm (66\").    Weight as of this encounter: 80.7 kg (178 lb).      Physical Exam  Vitals reviewed.   HENT:      Head: Normocephalic and atraumatic.      Mouth/Throat:      Mouth: Mucous membranes are dry.      Pharynx: Oropharynx is clear.   Eyes:      General: No scleral icterus.     Conjunctiva/sclera: Conjunctivae normal.   Cardiovascular:      Rate and Rhythm: Normal rate and regular rhythm.   Pulmonary:      Effort: Pulmonary effort is normal.      Breath sounds: Wheezing (mild inspiratory) present.   Musculoskeletal:      Comments: Noted ulnar deviation and swan neck changes in hands/wrists from chronic RA. Patient has enlarged MCPs   Skin:     General: Skin is warm and dry.   Neurological:      Mental Status: She is alert.      Comments: Increased tone bilateral lower extremities; 2/5 strength bilateral lower extremities.  The left one would be closer to a 3/5 as she is able to resist gravity.                     Assessment and Plan   Diagnoses and all orders for this visit:    1. Rheumatoid arthritis flare (Primary)  -     predniSONE (DELTASONE) 5 MG " tablet; Take 4 tablets by mouth Daily for 3 days, THEN 3 tablets Daily for 3 days, THEN 2 tablets Daily for 3 days. Return to previous maintenance dose after steroid taper.  Dispense: 27 tablet; Refill: 0    2. Venous insufficiency    3. Overweight (BMI 25.0-29.9)    4. Functional neurological symptom disorder with weakness or paralysis    5. Rheumatoid arthritis involving multiple sites, unspecified whether rheumatoid factor present    6. Osteoporosis with current pathological fracture, unspecified osteoporosis type, initial encounter    7. Chronic pain syndrome    8. T12 compression fracture, initial encounter ([facility])    9. 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  She is not able to perform really ADLs or activities without the assistance of a powered mobility device.  The patient has significant rheumatoid arthritis with chronic deformities of the hand and significant upper extremity weakness at baseline which makes her unable to use a manual wheelchair.    Patient is not full paraplegia, but has such profound lower extremity weakness after epidural abscess and cord compression.  Patient cannot significantly bear weight and cannot fully do transfers without assistance.    Patient has utilized a power mobility device in the past and has the intellectual and functional ability to use such a device.              Patient having a little bit of a flare of her RA.  Will do a steroid taper.  Patient has mild inspiratory wheeze, chronic.         Result Review :  The following data was reviewed by: Moises Oliveira MD on 05/10/2023:  CMP          7/18/2022    11:00 8/15/2022    12:00 12/7/2022    14:09   CMP   Glucose 108   107   175     BUN 24   50   43     Creatinine 0.52   0.73   1.26     EGFR 100.7   89.2      Sodium 139   140   140     Potassium 4.6   4.3   3.9     Chloride 104   105   103     Calcium 9.0   9.1   8.6     Total  Protein   7.2     Total Protein 7.4       Albumin 2.90    3.80     Globulin   3.4     Globulin 4.5       Total Bilirubin 0.3    <0.2     Alkaline Phosphatase 158    98     AST (SGOT) 31    12     ALT (SGPT) 7    12     Albumin/Globulin Ratio 0.6       BUN/Creatinine Ratio 46.2   68.5   34.1     Anion Gap 7.0   9.0        CBC          7/15/2022    13:40 7/18/2022    11:00 12/7/2022    14:09   CBC   WBC 5.52   4.39   4.61     RBC 3.20   3.37   3.85     Hemoglobin 8.2   8.6   11.1     Hematocrit 27.6   30.0   35.1     MCV 86.3   89.0   91.2     MCH 25.6   25.5   28.8     MCHC 29.7   28.7   31.6     RDW 16.9   16.9   15.8     Platelets 242   237   195       CBC w/diff          7/15/2022    13:40 7/18/2022    11:00 12/7/2022    14:09   CBC w/Diff   WBC 5.52   4.39   4.61     RBC 3.20   3.37   3.85     Hemoglobin 8.2   8.6   11.1     Hematocrit 27.6   30.0   35.1     MCV 86.3   89.0   91.2     MCH 25.6   25.5   28.8     MCHC 29.7   28.7   31.6     RDW 16.9   16.9   15.8     Platelets 242   237   195     Neutrophil Rel % 54.4   54.2   68.9     Immature Granulocyte Rel % 0.2   0.2      Lymphocyte Rel % 29.0   28.5   22.8     Monocyte Rel % 10.5   8.9   5.9     Eosinophil Rel % 5.4   7.5   1.5     Basophil Rel % 0.5   0.7   0.7       Lipid Panel          12/7/2022    14:09   Lipid Panel   Total Cholesterol 243     Triglycerides 95     HDL Cholesterol 58     VLDL Cholesterol 17     LDL Cholesterol  168       TSH          6/3/2022    22:29 12/7/2022    14:09   TSH   TSH 3.300   1.330                         Follow Up   No follow-ups on file.  Patient was given instructions and counseling regarding her condition or for health maintenance advice. Please see specific information pulled into the AVS if appropriate.       PHILL Oliveira MD, FACP, FHM      Electronically signed by Moises Oliveira MD, 05/10/23, 1:18 PM CDT.

## 2023-05-16 ENCOUNTER — TELEPHONE (OUTPATIENT)
Dept: INTERNAL MEDICINE | Facility: CLINIC | Age: 68
End: 2023-05-16
Payer: MEDICARE

## 2023-05-16 DIAGNOSIS — M06.9 RHEUMATOID ARTHRITIS INVOLVING MULTIPLE SITES, UNSPECIFIED WHETHER RHEUMATOID FACTOR PRESENT: ICD-10-CM

## 2023-05-16 DIAGNOSIS — F44.4 FUNCTIONAL NEUROLOGICAL SYMPTOM DISORDER WITH WEAKNESS OR PARALYSIS: Primary | ICD-10-CM

## 2023-05-16 DIAGNOSIS — G82.20 LOWER PARAPLEGIA: ICD-10-CM

## 2023-05-16 NOTE — TELEPHONE ENCOUNTER
Caller: Tawny Shin    Relationship: Self    Best call back number: [phone]    What is the medical concern/diagnosis: HOME HEALTH DISCHARGED PATIENT BUT RECOMMENDED SOME PHYSICAL THERAPY    What specialty or service is being requested: PHYSICAL THERAPY    PATIENT WOULD PREFER TO BE REFERRED TO [facility]    PLEASE LET PATIENT KNOW ONCE REFERRAL IS PLACED

## 2023-06-09 ENCOUNTER — TELEPHONE (OUTPATIENT)
Dept: CARDIOLOGY | Facility: CLINIC | Age: 68
End: 2023-06-09
Payer: MEDICARE

## 2023-06-09 NOTE — TELEPHONE ENCOUNTER
The patient called in reporting her Eliquis is now costing her $600-$700 a month now.  I educated her about patient assistance which she is interested in, however she mentioned she thought Dr. Hernandez talked to her about switching her off Eliquis.  I told her I would check with you first before I mailed her the application for patient assistance.  Please advise.  Thank you.

## 2023-06-12 ENCOUNTER — TREATMENT (OUTPATIENT)
Dept: PHYSICAL THERAPY | Facility: CLINIC | Age: 68
End: 2023-06-12
Payer: MEDICARE

## 2023-06-12 DIAGNOSIS — G82.20 LOWER PARAPLEGIA: Primary | ICD-10-CM

## 2023-06-12 DIAGNOSIS — F44.4 FUNCTIONAL NEUROLOGICAL SYMPTOM DISORDER WITH WEAKNESS OR PARALYSIS: ICD-10-CM

## 2023-06-12 PROCEDURE — 97535 SELF CARE MNGMENT TRAINING: CPT

## 2023-06-12 PROCEDURE — 97162 PT EVAL MOD COMPLEX 30 MIN: CPT

## 2023-06-12 NOTE — PROGRESS NOTES
Physical Therapy Initial Evaluation and Plan of Care  [address] [place] [address]    Patient: Tawny Shin   : 1955  Referring practitioner: Moises Oliveira MD  Date of Initial Visit: 2023  Today's Date: 2023  Patient seen for 1 sessions    Visit Diagnoses:    ICD-10-CM ICD-9-CM   1. Lower paraplegia  G82.20 344.1   2. 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    Elevated cholesterol     Eustachian tube dysfunction     Heart disease     Herpes simplex     History of transfusion     Hyperlipidemia     Hypertension     Hypothyroidism 2020    Intrinsic asthma 2020    Knee dislocation     Labral tear of right hip joint     Laryngitis sicca     Laryngitis, chronic     Left carotid bruit 2016    MI (myocardial infarction)     Myalgia due to statin 2019    Open wound of right hip 2021    Osteomyelitis of right femur 2021    Otorrhea     Pacemaker 2016    Primary osteoarthritis of left knee 2020    Psoriasis vulgaris 2020    S/P coronary artery stent placement 2016    Sensorineural hearing loss     Seropositive rheumatoid arthritis of multiple sites 2019    Overview:  -myochrysine '- -methotrexate '--->;r/s  restarted -->  (anemia) -sulfasalazine-  "not effective -penicillamine 6/98-->10/98; no effect -leflunomide 11/98--> - Humira '13-->didn't take - Enbrel 12/14-->3/15- no effect!   Last Assessment & Plan:  - \"aching all over\" because she had to be off her anti-rheumatic drugs for 2 weeks in preparation for her R knee surgery - he    Sick sinus syndrome 12/27/2019    Sjogren's disease     Spondylolisthesis of lumbar region 01/17/2018    Syncope, recurrent 02/08/2021    Urinary tract infection      Past Surgical History:   Procedure Laterality Date    A-V CARDIAC PACEMAKER INSERTION  2016    ATRIAL CARDIAC PACEMAKER INSERTION      CARDIAC CATHETERIZATION      CATARACT EXTRACTION      CERVICAL CORPECTOMY N/A 3/3/2021    Procedure: CERVICAL 6 CORPECTOMY WITH TITANIUM CAGE WITH NEURO MONITORING;  Surgeon: Bandar Shea MD;  Location:  [facility];  Service: Neurosurgery;  Laterality: N/A;    COLONOSCOPY  11/08/2011    One fold in the ascending colon which showed ulcer otherwise normal exam    COLONOSCOPY  11/12/2004    Normal exam repeat in five years    CORONARY ANGIOPLASTY WITH STENT PLACEMENT      X 2; 2013 & 2014    ENDOSCOPY  07/10/2014    Normal exam    FLAP LEG Right 9/14/2021    Procedure: RIGHT GLUTEAL FASCIOCUTANEOUS ADVANCEMENT FLAP AND RIGHT TENSOR FASCIAL JESSICA FLAP;  Surgeon: Amadeo Turner MD;  Location:  [facility];  Service: Plastics;  Laterality: Right;    HIP ABDUCTION TENOTOMY BILATERAL Right 1/14/2021    Procedure: RIGHT HIP GLUTEUS MEDLUS / MINIMUS REPAIR, POSSIBLE ACHILLES ALLOGRAFT;  Surgeon: Nino Carlson MD;  Location:  [facility];  Service: Orthopedics;  Laterality: Right;    INCISION AND DRAINAGE ABSCESS Right 6/4/2022    Procedure: INCISION AND DRAINAGE ABSCESS right hip;  Surgeon: Magda Salcido MD;  Location:  [facility];  Service: General;  Laterality: Right;    INCISION AND DRAINAGE ABSCESS Right 6/10/2022    Procedure: RIGHT HIP INCISION AND DRAINAGE. MD NEEDS 3L VANC IRRIGATION, CURRETTES, DAICANS, KERLEX ROLLS;  Surgeon: " Amadeo Turner MD;  Location:  [facility];  Service: Plastics;  Laterality: Right;    INCISION AND DRAINAGE HIP Right 2/9/2021    Procedure: HIP INCISION AND DRAINAGE;  Surgeon: Nino Carlson MD;  Location:  [facility];  Service: Orthopedics;  Laterality: Right;    INCISION AND DRAINAGE LEG Right 10/24/2021    Procedure: INCISION AND DRAINAGE LOWER EXTREMITY;  Surgeon: Amadeo Turner MD;  Location:  [facility];  Service: Plastics;  Laterality: Right;    INCISION AND DRAINAGE OF WOUND Right 7/8/2021    Procedure: INCISION AND DRAINAGE WOUND RIGHT HIP;  Surgeon: James Huntley MD;  Location:  [facility];  Service: Orthopedics;  Laterality: Right;    JOINT REPLACEMENT      KYPHOPLASTY WITH BIOPSY Bilateral 10/26/2021    Procedure: THOARCIC 12 KYPHOPLASTY WITH BIOPSY;  Surgeon: Bandar Shea MD;  Location:  [facility];  Service: Neurosurgery;  Laterality: Bilateral;    LEG DEBRIDEMENT Right 9/14/2021    Procedure: DEBRIDEMENT OF RIGHT HIP WOUND, RIGHT GLUTEAL FASCIOCUTANEOUS ADVANCEMENT FLAP AND RIGHT TENSOR FASCIAL JESSICA FLAP;  Surgeon: Amadeo Turner MD;  Location:  [facility];  Service: Plastics;  Laterality: Right;    LUMBAR DISCECTOMY Right 3/23/2021    Procedure: LUMBAR DISCECTOMY MICRO, Lumbar 1/2 right;  Surgeon: Bandar Shea MD;  Location:  [facility];  Service: Neurosurgery;  Laterality: Right;    LUMBAR FUSION N/A 1/19/2018    Procedure: L3-4,L4-5 DECOMPRESSION, POSTERIOR SPINAL FUSION WITH INSTRUMENTATION;  Surgeon: Fortino Oropeza MD;  Location: [facility] OR;  Service:     LUMBAR LAMINECTOMY WITH FUSION Left 1/17/2018    Procedure: LEFT L3-4 L4-5 LATERAL LUMBAR INTERBODY FUSION;  Surgeon: Fortino Oropeza MD;  Location:  [facility];  Service:     MYRINGOTOMY W/ TUBES  09/04/2014    TUBES NO LONGER IN PLACE    OTHER SURGICAL HISTORY      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 so hardware was removed and spacers were placed    REPLACEMENT TOTAL KNEE Right          SUBJECTIVE     Subjective  Evaluation    History of Present Illness  Mechanism of injury: She fell off the bed and broke her back ~1.5 yrs ago. After that she couldn't move her R leg. She can now move it but can't put much weight on it d/t weakness. She was in the SNF for a year, got out Jan 1st. She had home health after and was able to ambulate 6-8' w/ FWW prior to needing to sit d/t fatigue but reports most of her weight was on her L leg. She would like to walk with a walker. She has had a motorized WC since Sep of last year. Her knees sometimes bother her, she had a previous knee surgery in the RLE that required revision d/t infection and now has a spacer in that leg. She has paresthesia in her toes. She has a pressure injury on her L shoulder but wa then told it was basal cell, had part of it removed but has to return for f/u. No stairs to enter. She has a 24/7 caregiver. She is interested in an OT referral. She reports using SB for WC <> bed tx and stand pivot WC <> elevated toilet seat. She has a hospital bed.           OBJECTIVE     Objective   LE MMT   HIP Strength L Strength R   flexion 4-  4-   ABD 2+  2+        KNEE     flexion 4-  4-   extension 4-  2        ANKLE     dorsiflexion 3+  2      Sensation: normal light touch sensation and normal position sensation.    Pt independent w/ all bed mobility    Pt independent w/ SB tx WC <> mat table    Pt was part/modA for STS from motorized WC in // bars    Pt demo's knee flexion and PF contracture on RLE resulting in heel-off and absent heel strike at initial contact; decreased RLE Wbing throughout gait cycle, heavy UE use for LE advancement on // bars and w/ FWW    Pt able to amb 6' in // bars w/ modA x2, 3' w/ modA x2 w/ FWW and immediate power WC follow    Therapy Education/Self Care [id number]   Education offered today POC, HEP below   [employer] Code    Ongoing HEP   Prone knee hand w/ ankle weight  Seated hamstring/gastroc stretch heel prop on chair using belt    Timed Minutes 8        Total Timed Treatment:     8   mins  Total Time of Visit:            45   mins    ASSESSMENT/PLAN     GOALS:  Goals                                                    Progress Note due by 7/12/2023                                                                Recert due by 9/8/2023   LTG by: 12 weeks Comments Date Status   Patient will report compliance with HEP.        Pt will be able to maintain static standing w/ 50/50 Wbing for >/=3 mins w/o UE support/a to improve capacity for ADL/IADLs      Patient to perform TUG in </=2 mins w/ FWW without LOB for improved capacity for household ambulation.       Pt will be able to perform 5x STS w/ 50/50 Wbing in </=2 mins for improved functional strength      Improve R ankle DF to neutral      Improve gross B LE strength to >/=4/5           Assessment & Plan     Assessment  Impairments: abnormal coordination, abnormal gait, abnormal muscle firing, abnormal muscle tone, abnormal or restricted ROM, activity intolerance, impaired balance, impaired physical strength, lacks appropriate home exercise program, pain with function, safety issue and weight-bearing intolerance  Functional Limitations: carrying objects, lifting, sleeping, walking, pulling, pushing, uncomfortable because of pain, moving in bed, sitting, standing, stooping, reaching behind back, reaching overhead and unable to perform repetitive tasks  Assessment details: Tawny Shin presents w/ impaired functional mobility secondary to medical events detailed per chart review and pt subjective report. The pt exhibits RLE > LLE  weakness w/ functional and formal testing; abnormal gait pattern; impaired static/dynamic standing balance; and decreased activity tolerance. Due to the aforementioned anatomical and functional limitations, the pt will require skilled OP PT services to optimize functional recovery, safety, and independence.  Prognosis: good    Plan  Therapy options: will be seen for skilled therapy  services  Planned modality interventions: thermotherapy (hydrocollator packs), cryotherapy, low level laser therapy, TENS, dry needling, electrical stimulation/[place]ic stimulation and ultrasound  Planned therapy interventions: abdominal trunk stabilization, manual therapy, ADL retraining, motor coordination training, balance/weight-bearing training, neuromuscular re-education, body mechanics training, postural training, soft tissue mobilization, spinal/joint mobilization, fine motor coordination training, flexibility, functional ROM exercises, strengthening, gait training, stretching, home exercise program, therapeutic activities, IADL retraining, joint mobilization and transfer training  Frequency: 2x week  Duration in weeks: 12  Treatment plan discussed with: patient  Plan details: Patient will be seen 2x/wk x 12wks with treatment to include strengthening, stretching, manual therapy, neuromuscular re-education, balance, gait and endurance training.        SIGNATURE: Molly Garrett, [place], [place] License #: [id number]  Electronically Signed on 6/12/2023    Initial Certification  Certification Period: 6/12/2023 through 9/9/2023  I certify that the therapy services are furnished while this patient is under my care.  The services outlined above are required by this patient, and will be reviewed every 90 days.     PHYSICIAN: Moises Oliveira MD (NPI: [id number])    Signature: _______________________________________DATE: _________    Please sign and return via fax to [phone].   Thank you so much for letting us work with Tawny. I appreciate your letting us work with your patients. If you have any questions or concerns, please don't hesitate to contact me.          [address] [address]  [phone]

## 2023-06-13 DIAGNOSIS — M06.9 RHEUMATOID ARTHRITIS INVOLVING MULTIPLE SITES, UNSPECIFIED WHETHER RHEUMATOID FACTOR PRESENT: ICD-10-CM

## 2023-06-13 DIAGNOSIS — F44.4 FUNCTIONAL NEUROLOGICAL SYMPTOM DISORDER WITH WEAKNESS OR PARALYSIS: ICD-10-CM

## 2023-06-13 DIAGNOSIS — G82.20 LOWER PARAPLEGIA: Primary | ICD-10-CM

## 2023-06-19 ENCOUNTER — TREATMENT (OUTPATIENT)
Dept: PHYSICAL THERAPY | Facility: CLINIC | Age: 68
End: 2023-06-19
Payer: MEDICARE

## 2023-06-19 DIAGNOSIS — G82.20 LOWER PARAPLEGIA: ICD-10-CM

## 2023-06-19 DIAGNOSIS — F44.4 FUNCTIONAL NEUROLOGICAL SYMPTOM DISORDER WITH WEAKNESS OR PARALYSIS: Primary | ICD-10-CM

## 2023-06-19 PROCEDURE — 97140 MANUAL THERAPY 1/> REGIONS: CPT

## 2023-06-19 PROCEDURE — 97112 NEUROMUSCULAR REEDUCATION: CPT

## 2023-06-19 NOTE — PROGRESS NOTES
Physical Therapy Treatment Note  [address] [place] [address]    Patient: Tawny Shin                                                 Visit Date: 2023  :     1955    Referring practitioner:    Moises Oliveira MD  Date of Initial Visit:          Type: THERAPY  Noted: 2023    Patient seen for 2 sessions    Visit Diagnoses:    ICD-10-CM ICD-9-CM   1. Functional neurological symptom disorder with weakness or paralysis  F44.4 300.11   2. Lower paraplegia  G82.20 344.1     SUBJECTIVE     Subjective: Pt verbalizes that it is important to her to get her R foot flat. She does not remember her HEP exercises and requests a printed copy today.       PAIN: No reports of pain today, just weakness.          OBJECTIVE     Objective     Neuromuscular Reeducation     [id number] Comments   NeuroCom: Weight shifting w/ B UE support and walker Jose R x2; keep chair near   Timed Minutes 30      Manual Therapy     [id number]  Comments   IASTM R gastroc, level 1 w/ ball attachment    PROM RLE    Timed Minutes 15           Therapy Education/Self Care [id number]   Education offered today HEP   MedFIMBexe Code [email or web address]   Ongoing HEP   Access Code:   URL: [email or web address]  Date: 2023  Prepared by: Molly Garrett    Exercises  - Prone Knee Extension with Ankle Weight  - 1 x daily - 7 x weekly - 3 sets - 10 reps  - Seated Hamstring Stretch with Chair  - 1 x daily - 7 x weekly - 3 sets - 10 reps  - Supine Bridge  - 1 x daily - 7 x weekly - 3 sets - 10 reps   Timed Minutes        Total Timed Treatment:     45   mins  Total Time of Visit:             45   mins         ASSESSMENT/PLAN     GOALS  Goals                                                    Progress Note due by 2023                                                                Recert due by 2023   LTG by: 12 weeks Comments Date Status   Patient will report compliance with HEP.   HEP  assigned today   6/19     Pt will be able to maintain static standing w/ 50/50 Wbing for >/=3 mins w/o UE support/a to improve capacity for ADL/IADLs  Able to stand for <1 minute with FW and heavy reliance on B UE support. Able to maintain 50/50 for <5 seconds.   6/19  ongoing   Patient to perform TUG in </=2 mins w/ FWW without LOB for improved capacity for household ambulation.         Pt will be able to perform 5x STS w/ 50/50 Wbing in </=2 mins for improved functional strength         Improve R ankle DF to neutral  see objective  6/19 ongoing    Improve gross B LE strength to >/=4/5             Assessment/Plan     ASSESSMENT: Pt stance and weightbearing was assessed with NeuroCom today. On average, resting stance is 60/40 LLE>RLE w/ gradually increasing discrepancy as she fatigues. She has heavy reliance on B UE support through forward walker and is only able to tolerate standing for <1 minute at a time before requiring a seated rest break. Requires CGAx2 for sit to stand and in standing. Pt requires verbal cues to stand up straight and shift weight through hips and trunk as she prefers to use hip strategy to shift medial/lateral. Based on assessment of ROM, R knee extension is limited by gastrocnemius > hamstrings.     PLAN: Emphasize practice of weight shifting to RLE and gradually increased time in standing for improved endurance. Include manual therapy of R gastrocnemius and assess for improved ankle ROM. Include stretching, strengthening, gait training and neuromuscular re-education for improved functional tasks and tolerance to activity.     SIGNATURE: Kevon Torres PT Student  Electronically Signed on 6/19/2023    The clinical instructor and/or supervising staff, Molly Garrett PT, was present in clinic guiding the visit by approving, concurring, and confirming the skilled judgement for all services rendered.    Signature:  Molly Garrett PT, [place] License #: [id number]  Electronically signed  on 6/19/2023         [address] [place] [address]  [phone]

## 2023-07-11 NOTE — PROGRESS NOTES
ARGELIA Guerrero APRN          Internal Medicine Progress Note    3/20/2021   11:04 CDT    Name:  Tawny Shin  MRN:    [id number]     Acct:     [id number]   Room:  [address] [place] [address] Day: 0     Admit Date: 3/6/2021 11:36 AM  PCP: Davon Urrutia MD    Subjective:     C/C: Wound care and rehabilitation efforts.     Interval History: Status: stable. Up to chair, eyes closed, respirations even and unlabored; arouses easily to voice.  No family at bedside. States that pain is about the same. Afebrile. Plans for drainage of psoas abscess today. Still with constipation despite aggressive bowel regimen.       Review of Systems   Constitutional: Positive for malaise/fatigue. Negative for chills, decreased appetite, fever, weight gain and weight loss.   HENT: Negative for congestion, ear discharge, hoarse voice, sore throat and tinnitus.         Cervical collar in place   Eyes: Negative for blurred vision, discharge, double vision, pain, visual disturbance and visual halos.   Cardiovascular: Negative for chest pain, claudication, dyspnea on exertion, irregular heartbeat, leg swelling, orthopnea and paroxysmal nocturnal dyspnea.   Respiratory: Negative for cough, hemoptysis, shortness of breath, sputum production and wheezing.    Endocrine: Positive for heat intolerance. Negative for cold intolerance and polyuria.   Hematologic/Lymphatic: Negative for adenopathy and bleeding problem. Does not bruise/bleed easily.   Skin: Positive for poor wound healing. Negative for dry skin, itching, rash and suspicious lesions.   Musculoskeletal: Positive for back pain, falls and joint pain. Negative for arthritis, muscle weakness and myalgias.   Gastrointestinal: Positive for constipation. Negative for abdominal pain, diarrhea, dysphagia, hematemesis, nausea and vomiting.   Genitourinary: Negative for bladder incontinence, dysuria, flank pain and frequency.   Neurological: Positive for weakness.  Negative for aphonia, difficulty with concentration, disturbances in coordination and dizziness.   Psychiatric/Behavioral: Negative for altered mental status, depression, memory loss, substance abuse and suicidal ideas. The patient does not have insomnia and is not nervous/anxious.    Allergic/Immunologic: Negative for environmental allergies, HIV exposure and hives.         Medications:     Allergies:   Allergies   Allergen Reactions   • Atorvastatin Other (See Comments)     LEG CRAMPS     • Amoxicillin Rash   • Escitalopram Rash   • Nabumetone Rash   • Niacin Er Rash   • Penicillins Rash   • Simvastatin Rash       Current Meds:   Current Facility-Administered Medications:   •  acetaminophen (TYLENOL) tablet 650 mg, 650 mg, Oral, Q4H PRN **OR** acetaminophen (TYLENOL) suppository 650 mg, 650 mg, Rectal, Q4H PRN, Beni Urrutia MD  •  ascorbic acid (VITAMIN C) tablet 500 mg, 500 mg, Oral, BID, Beni Urrutia MD  •  bisacodyl (DULCOLAX) suppository 10 mg, 10 mg, Rectal, Daily PRN, Beni Urrutia MD  •  budesonide (PULMICORT) nebulizer solution 0.5 mg, 0.5 mg, Nebulization, BID PRN, Beni Urrutia MD  •  carvedilol (COREG) tablet 25 mg, 25 mg, Oral, BID With Meals, Beni Urrutia MD  •  ceFAZolin in 0.9% normal saline (ANCEF) IVPB solution 2 g, 2 g, Intravenous, Q8H, Elie Copeland MD  •  fentaNYL (DURAGESIC) 25 MCG/HR patch 1 patch, 1 patch, Transdermal, Q72H **AND** Check Fentanyl Patch Placement, 1 each, Does not apply, Q12H, Beni Urrutia MD  •  cloNIDine (CATAPRES) tablet 0.1 mg, 0.1 mg, Oral, BID PRN, Beni Urrutia MD  •  cyclobenzaprine (FLEXERIL) tablet 10 mg, 10 mg, Oral, TID, Beni Urrutia MD  •  docusate sodium (COLACE) capsule 100 mg, 100 mg, Oral, BID, Beni Urrutia MD  •  folic acid (FOLVITE) tablet 1 mg, 1 mg, Oral, Daily, Beni Urrutia MD  •  gabapentin (NEURONTIN) capsule 100 mg, 100 mg, Oral, Q12H, Beni Urrutia  MD Wesley  •  heparin (porcine) 5000 UNIT/ML injection 5,000 Units, 5,000 Units, Subcutaneous, Q12H, Beni Urrutia MD  •  HYDROmorphone (DILAUDID) injection 1 mg, 1 mg, Intravenous, PRN, Beni Urrutia MD  •  isosorbide mononitrate (IMDUR) 24 hr tablet 60 mg, 60 mg, Oral, BID - Nitrates, Beni Urrutia MD  •  labetalol (NORMODYNE,TRANDATE) injection 20 mg, 20 mg, Intravenous, Q10 Min PRN, Beni Urrutia MD  •  levothyroxine (SYNTHROID, LEVOTHROID) tablet 75 mcg, 75 mcg, Oral, Q AM, Beni Urrutia MD  •  lidocaine (LIDODERM) 5 % 1 patch, 1 patch, Transdermal, Daily PRN, Beni Urrutia MD  •  magnesium hydroxide (MILK OF MAGNESIA) suspension 2400 mg/10mL 10 mL, 10 mL, Oral, Daily PRN, Beni Urrutia MD  •  Menthol (Topical Analgesic) 4 % gel 1 application, 1 application, Apply externally, TID PRN, Beni Urrutia MD  •  methylnaltrexone (RELISTOR) injection 6 mg, 6 mg, Subcutaneous, Every Other Day, Beni Urrutia MD  •  multivitamin with minerals 1 tablet, 1 tablet, Oral, Daily, Beni Urrutia MD  •  naloxone (NARCAN) injection 0.1 mg, 0.1 mg, Intravenous, Q5 Min PRN, Beni Urrutia MD  •  nitroglycerin (NITROSTAT) SL tablet 0.4 mg, 0.4 mg, Sublingual, Q5 Min PRN, Beni Urrutia MD  •  ondansetron (ZOFRAN) tablet 4 mg, 4 mg, Oral, Q6H PRN **OR** ondansetron (ZOFRAN) injection 4 mg, 4 mg, Intravenous, Q6H PRN, Beni Urrutia MD  •  oxyCODONE-acetaminophen (PERCOCET)  MG per tablet 2 tablet, 2 tablet, Oral, Q8H PRN, Beni Urrutia MD  •  pantoprazole (PROTONIX) EC tablet 40 mg, 40 mg, Oral, QAM AC, Beni Urrutia MD  •  polyethylene glycol (MIRALAX) packet 17 g, 17 g, Oral, Daily, Beni Urrutia MD  •  predniSONE (DELTASONE) tablet 5 mg, 5 mg, Oral, Daily With Breakfast, Beni Urrutia MD  •  saccharomyces boulardii (FLORASTOR) capsule 250 mg, 250 mg, Oral, BID, Beni Urrutia  MD Wesley  •  sennosides-docusate (PERICOLACE) 8.6-50 MG per tablet 2 tablet, 2 tablet, Oral, BID, Beni Urrutia MD  •  sodium chloride 0.9 % flush 10 mL, 10 mL, Intravenous, Q12H, Beni Urrutia MD  •  sodium chloride 0.9 % flush 10 mL, 10 mL, Intravenous, PRN, Beni Urrutia MD  •  valACYclovir (VALTREX) tablet 500 mg, 500 mg, Oral, Q24H, Beni Urrutia MD  •  zinc sulfate (ZINCATE) capsule 220 mg, 220 mg, Oral, Daily, Beni Urrutia MD    Data:     Code Status:    There are no questions and answers to display.       Family History   Problem Relation Age of Onset   • Cancer Mother    • Heart disease Father        Social History     Socioeconomic History   • Marital status:      Spouse name: Not on file   • Number of children: Not on file   • Years of education: Not on file   • Highest education level: Not on file   Tobacco Use   • Smoking status: Never Smoker   • Smokeless tobacco: Never Used   Substance and Sexual Activity   • Alcohol use: No   • Drug use: Never   • Sexual activity: Defer       Vitals:  Wt 104 kg (229 lb 12.8 oz)   BMI 38.24 kg/m²   T 97.8 P 86 R 16 /71 Sp02 92% (room air)    I/O (24Hr):  No intake or output data in the 24 hours ending 03/20/21 1104    Labs and imaging:      No results found for this or any previous visit (from the past 12 hour(s)).        Physical Examination:        Physical Exam  Vitals and nursing note reviewed.   Constitutional:       Appearance: Normal appearance.   HENT:      Head: Normocephalic and atraumatic.      Right Ear: External ear normal.      Left Ear: External ear normal.      Nose: Nose normal.      Mouth/Throat:      Mouth: Mucous membranes are moist.      Pharynx: Oropharynx is clear.   Eyes:      Extraocular Movements: Extraocular movements intact.      Pupils: Pupils are equal, round, and reactive to light.   Neck:      Comments: Cervical collar in place  Incision c/d/i  Cardiovascular:      Rate and  Rhythm: Normal rate and regular rhythm.      Pulses: Normal pulses.      Heart sounds: Normal heart sounds.   Pulmonary:      Effort: Pulmonary effort is normal.      Breath sounds: Normal breath sounds.   Abdominal:      General: Abdomen is flat. Bowel sounds are normal.      Palpations: Abdomen is soft.      Tenderness: There is abdominal tenderness.      Comments: Lower abdominal fullness, hypoactive bowel sounds   Musculoskeletal:      Cervical back: Normal range of motion. Pain with movement present.      Right lower leg: Edema present.      Left lower leg: Edema present.      Comments: Generalized weakness  Limited ROM RLE  Mild lower extremity edema   Skin:     General: Skin is warm and dry.      Capillary Refill: Capillary refill takes less than 2 seconds.      Comments: Wound vac intact right hip   Neurological:      General: No focal deficit present.      Mental Status: She is alert and oriented to person, place, and time.   Psychiatric:         Mood and Affect: Mood normal.         Behavior: Behavior normal.         Thought Content: Thought content normal.            Assessment:             * No active hospital problems. *    Past Medical History:   Diagnosis Date   • Arthritis    • Asthma    • Chronic sinusitis    • Coronary artery disease    • Eustachian tube dysfunction    • Heart disease    • Herpes simplex    • Hyperlipidemia    • Hypertension    • Knee dislocation    • Labral tear of right hip joint    • Laryngitis sicca    • Laryngitis, chronic    • MI (myocardial infarction) (CMS/HCC)    • Otorrhea    • Sensorineural hearing loss    • Sjogren's disease (CMS/[facility])         Plan:        1. MSSA bacteremia  2. Torn gluteus muscle s/p repair  3. Post-operative MSSA wound infection  4. Cervical spinal stenosis s/p corpectomy and decompression  5. RA  6. Chronic immunosuppression  7. Multifactorial anemia  8. Hypothyroidism  9. GERD  10. Hx CAD  11. Herpes simplex on chronic suppressive  therapy  12. HTN  13. Right psoas abscess  14. L1-L2 discitis osteomyelitis  15. Constipation      Continue current treatment. Monitor counts. Increase activity as tolerated. Maintain patient safety.  Wound care per wound care team. Aggressive therapy as tolerated with activity/weightbearing restrictions per ortho recommendations.  Antibiotics under direction of ID. Continue pain control efforts and avoid oversedation. Continue bowel regimen.  Labs Monday. Continue gentle diuresis.   Electronically signed by Beni Urrutia MD on 3/20/2021 at 11:04 CDT   CORRIE SUAREZ     Call bell/Explanation of exam/test/Fall precautions/Side rails

## 2023-07-17 PROBLEM — C44.90 SKIN CANCER: Status: ACTIVE | Noted: 2023-07-17

## 2023-07-17 PROBLEM — C44.529 SQUAMOUS CELL CARCINOMA OF BACK: Status: ACTIVE | Noted: 2023-07-17

## 2023-07-25 ENCOUNTER — TREATMENT (OUTPATIENT)
Dept: PHYSICAL THERAPY | Facility: CLINIC | Age: 68
End: 2023-07-25
Payer: MEDICARE

## 2023-07-25 DIAGNOSIS — G82.20 LOWER PARAPLEGIA: ICD-10-CM

## 2023-07-25 DIAGNOSIS — F44.4 FUNCTIONAL NEUROLOGICAL SYMPTOM DISORDER WITH WEAKNESS OR PARALYSIS: Primary | ICD-10-CM

## 2023-07-25 NOTE — PROGRESS NOTES
Physical Therapy Treatment Note  [address] [place] [address]    Patient: Tawny Shin                                                 Visit Date: 2023  :     1955    Referring practitioner:    Moises Oliveira MD  Date of Initial Visit:          Type: THERAPY  Noted: 2023    Patient seen for 8 sessions    Visit Diagnoses:    ICD-10-CM ICD-9-CM   1. Functional neurological symptom disorder with weakness or paralysis  F44.4 300.11   2. Lower paraplegia  G82.20 344.1     SUBJECTIVE     Subjective: She reports R foot is painful to touch/WB on lateral aspect. She reports this has been occurring for about two months. She reports pain still in R hand. She told her dr about this and she reports that he told her to wait two weeks and if it's still hurting, they will look into it more. She reports that t/f's with caregiver aren't always very smooth as she will sometimes provide more assist than needed.     PAIN: 3-4/10     OBJECTIVE     Objective     Therapeutic Exercises    40482 Units Comments   Seated marches  2 x 10  Added to HEP, cues for activating core, not bending or slouching    Split HL SLR against gravity  2 x 10  Added to HEP   B heel slides over 55 cm SB with eccentric focus and prioritizing quad set at full ext  2 x 10     B SL clamshells against gravity  x10 ea Very weak on R, unable to resist gravity         Timed Minutes 40      Therapeutic Activities    01484 Comments   W/C <> table with standard walker  CGA                   Timed Minutes 5     Therapy Education/Self Care 75326   Education offered today See below   [facility] Code [email or web address]    Ongoing HEP   Date: 2023  Prepared by: Maria R Garya    Exercises  - Prone Knee Extension with Ankle Weight  - 2-3 x daily - 7 x weekly - 30-60 seconds hold  - Seated Hamstring Stretch with Chair  - 2-3 x daily - 7 x weekly - 30-60 seconds hold  - Supine Bridge  - 2 x  daily - 7 x weekly - 2 sets - 10 reps  - Supine March  - 1-2 x daily - 7 x weekly - 2 sets - 10 reps  - Supine Active Straight Leg Raise  - 1-2 x daily - 7 x weekly - 2 sets - 10 reps   Timed Minutes       Total Timed Treatment:     45   mins  Total Time of Visit:             45  mins         ASSESSMENT/PLAN     GOALS  Goals                                            Progress Note due by 8/10/2023                                                                Recert due by 9/8/2023   LTG by: 12 weeks Comments Date Status   Patient will report compliance with HEP.  Reinforced today 7/11 ongoing   Pt will be able to maintain static standing w/ 50/50 Wbing for >/=3 mins w/o UE support/a to improve capacity for ADL/IADLs 19.70 sec  39.94 sec  24.82 sec  With UE support L LE #100 R LE #45    7/11 ongoing   Patient to perform TUG in </=2 mins w/ FWW without LOB for improved capacity for household ambulation. Not appropriate at this time 7/11 ongoing   Pt will be able to perform 5x STS w/ 50/50 Wbing in </=2 mins for improved functional strength 17.53 sec but not EWB 7/11 ongoing   Improve R ankle DF to neutral Addressed with stretching today. Reinforced HEP 7/13 ongoing    Improve gross B LE strength to >/=4/5 B hip flexion 4-/5, R hip abd 2+/5, L hip abd 3+/5 7/11  ogoing     Assessment/Plan     ASSESSMENT: I initially planned to work on functional strengthening to directly improve t/f's today; however, WB was painful d/t increase in R foot pain and WB was therefore avoided. Will address next session, if appropriate. As a result, focus was on strengthening which will translate into improved balance and stability and subsequently assist with t/f independence. She was able to perform w/c <> table t/f with only CGA, nearly SBA, today.     PLAN: Continue to focus on overall functional mobility including transfers and sit<>stands. Consider lateral WS and side steps to assist with t/f's. Consider consistent use of leg press to  encourage gradually increasing WB of RLE.    SIGNATURE: Maria R Garay PTA, [place] License #: [id number]  Electronically signed on 7/25/2023         [address] [place] [address]  [phone]

## 2023-07-27 ENCOUNTER — TREATMENT (OUTPATIENT)
Dept: PHYSICAL THERAPY | Facility: CLINIC | Age: 68
End: 2023-07-27
Payer: MEDICARE

## 2023-07-27 DIAGNOSIS — G82.20 LOWER PARAPLEGIA: ICD-10-CM

## 2023-07-27 DIAGNOSIS — F44.4 FUNCTIONAL NEUROLOGICAL SYMPTOM DISORDER WITH WEAKNESS OR PARALYSIS: Primary | ICD-10-CM

## 2023-07-27 PROCEDURE — 97530 THERAPEUTIC ACTIVITIES: CPT | Performed by: PHYSICAL THERAPIST

## 2023-07-27 PROCEDURE — 97110 THERAPEUTIC EXERCISES: CPT | Performed by: PHYSICAL THERAPIST

## 2023-07-27 NOTE — PROGRESS NOTES
Physical Therapy Treatment Note  [address] [place] [address]    Patient: Tawny Shin                                                 Visit Date: 2023  :     1955    Referring practitioner:    Moises Oliveira MD  Date of Initial Visit:          Type: THERAPY  Noted: 2023    Patient seen for 9 sessions    Visit Diagnoses:    ICD-10-CM ICD-9-CM   1. Functional neurological symptom disorder with weakness or paralysis  F44.4 300.11   2. Lower paraplegia  G82.20 344.1     SUBJECTIVE     Subjective:She reports since the last session no new complaints      PAIN: 0/10         OBJECTIVE     Objective     Therapeutic Activities    77624 Comments   B side stepping end to end @ neuro table with SW then same with Rwx.                    Timed Minutes 30      Therapeutic Exercises    64249 Units Comments   Manual B pec and thoracic stretches in sitting with roller      B unilateral seated hamstring curls with red Deanne x 20     B unilateral LAQ's with #3 x 20               Timed Minutes 15        Therapy Education/Self Care [id number]   Education offered today    [facility] Code [id number]     Ongoing HEP   Date: 2023  Prepared by: Maria R Garay     Exercises  - Prone Knee Extension with Ankle Weight  - 2-3 x daily - 7 x weekly - 30-60 seconds hold  - Seated Hamstring Stretch with Chair  - 2-3 x daily - 7 x weekly - 30-60 seconds hold  - Supine Bridge  - 2 x daily - 7 x weekly - 2 sets - 10 reps  - Supine March  - 1-2 x daily - 7 x weekly - 2 sets - 10 reps  - Supine Active Straight Leg Raise  - 1-2 x daily - 7 x weekly - 2 sets - 10 reps   Timed Minutes        Total Timed Treatment:     45   mins  Total Time of Visit:             45   mins         ASSESSMENT/PLAN     GOALS  Goals                                            Progress Note due by 8/10/2023                                                                Recert due by 2023    LTG by: 12 weeks Comments Date Status   Patient will report compliance with HEP.  Reinforced today 7/27 ongoing   Pt will be able to maintain static standing w/ 50/50 Wbing for >/=3 mins w/o UE support/a to improve capacity for ADL/IADLs 19.70 sec  39.94 sec  24.82 sec  With UE support L LE #100 R LE #45    7/11 ongoing   Patient to perform TUG in </=2 mins w/ FWW without LOB for improved capacity for household ambulation. Not appropriate at this time 7/11 ongoing   Pt will be able to perform 5x STS w/ 50/50 Wbing in </=2 mins for improved functional strength 17.53 sec but not EWB 7/11 ongoing   Improve R ankle DF to neutral Addressed with stretching today. Reinforced HEP 7/13 ongoing    Improve gross B LE strength to >/=4/5 B hip flexion 4-/5, R hip abd 2+/5, L hip abd 3+/5 7/11  ogoing       Assessment/Plan     ASSESSMENT:   She did well with the B side stepping but did have more difficulty moving towards her R and required a seated rest the second time moving to her R only.    PLAN:   Continue to work on her overall strength and conditioning    SIGNATURE: Kentrell Lamb PTA, [place] License #: [id number]  Electronically Signed on 7/27/2023        [facility] Guzman  [place] [address]  [phone]

## 2023-07-31 NOTE — PROGRESS NOTES
[facility] - PODIATRY    Today's Date: 08/08/2023     Patient Name: Tawny Shin  MRN: [id number]  CSN: [id number]  PCP: Moises Oliveira MD  Referring Provider: No ref. provider found    SUBJECTIVE     Chief Complaint   Patient presents with    Follow-up     Moises Oliveira MD-05/10/2023-Return in about 3 months-pt states she is here today for a toe nail trim/pt presents in power chair-pt is paraplegic     HPI: Tawny Shin, a 68 y.o.female, comes to clinic as a(n) established patient complaining of thickened, irregular toenails of both feet . Patient has h/o arthritis, asthma, bunions, CA, Heart block, Charcot joint of left foot, DVT, COPD, CAD, HLD, HTN, Hypothyroid, MI, pacemaker .  Patient presents for nail care of thickened, irregular toenails of both feet. Denies any open wounds or sores currently. States that she feels like she has some neuropathy in feet as she is unable to fully feel toes. Continues use of wheelchair.  Relates that she goes to PT twice per week. Denies pain today. Relates previous treatment(s) including care by podiatry . Patient continues Eliquis daily. Denies any constitutional symptoms. No other pedal complaints at this time.    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    Elevated cholesterol     Eustachian tube dysfunction     Heart disease     Herpes simplex     History of transfusion     Hyperlipidemia     Hypertension      "Hypothyroidism 12/23/2020    Intrinsic asthma 12/23/2020    Knee dislocation     Labral tear of right hip joint     Laryngitis sicca     Laryngitis, chronic     Left carotid bruit 03/09/2016    MI (myocardial infarction)     Myalgia due to statin 06/25/2019    Open wound of right hip 09/14/2021    Osteomyelitis of right femur 07/06/2021    Otorrhea     Pacemaker 11/17/2016    Primary osteoarthritis of left knee 12/23/2020    Psoriasis vulgaris 12/23/2020    S/P coronary artery stent placement 03/09/2016    Sensorineural hearing loss     Seropositive rheumatoid arthritis of multiple sites 12/27/2019    Overview:  -myochrysine '93-'96 -methotrexate '96--->11/98;r/s  restarted 2/99--> 8/14 (anemia) -sulfasalazine- not effective -penicillamine 6/98-->10/98; no effect -leflunomide 11/98--> - Humira '13-->didn't take - Enbrel 12/14-->3/15- no effect!   Last Assessment & Plan:  - \"aching all over\" because she had to be off her anti-rheumatic drugs for 2 weeks in preparation for her R knee surgery - he    Sick sinus syndrome 12/27/2019    Sjogren's disease     Spondylolisthesis of lumbar region 01/17/2018    Syncope, recurrent 02/08/2021    Urinary tract infection      Past Surgical History:   Procedure Laterality Date    A-V CARDIAC PACEMAKER INSERTION  2016    ATRIAL CARDIAC PACEMAKER INSERTION      CARDIAC CATHETERIZATION      CATARACT EXTRACTION      CERVICAL CORPECTOMY N/A 3/3/2021    Procedure: CERVICAL 6 CORPECTOMY WITH TITANIUM CAGE WITH NEURO MONITORING;  Surgeon: Bandar Shea MD;  Location: [facility];  Service: Neurosurgery;  Laterality: N/A;    COLONOSCOPY  11/08/2011    One fold in the ascending colon which showed ulcer otherwise normal exam    COLONOSCOPY  11/12/2004    Normal exam repeat in five years    CORONARY ANGIOPLASTY WITH STENT PLACEMENT      X 2; 2013 & 2014    ENDOSCOPY  07/10/2014    Normal exam    FLAP LEG Right 9/14/2021    Procedure: RIGHT GLUTEAL FASCIOCUTANEOUS ADVANCEMENT FLAP AND " RIGHT TENSOR FASCIAL JESSICA FLAP;  Surgeon: Amadeo Turner MD;  Location:  [facility];  Service: Plastics;  Laterality: Right;    HIP ABDUCTION TENOTOMY BILATERAL Right 1/14/2021    Procedure: RIGHT HIP GLUTEUS MEDLUS / MINIMUS REPAIR, POSSIBLE ACHILLES ALLOGRAFT;  Surgeon: Nino Carlson MD;  Location:  [facility];  Service: Orthopedics;  Laterality: Right;    INCISION AND DRAINAGE ABSCESS Right 6/4/2022    Procedure: INCISION AND DRAINAGE ABSCESS right hip;  Surgeon: Magda Salcido MD;  Location:  [facility];  Service: General;  Laterality: Right;    INCISION AND DRAINAGE ABSCESS Right 6/10/2022    Procedure: RIGHT HIP INCISION AND DRAINAGE. MD NEEDS 3L VANC IRRIGATION, CURRETTES, DAICANS, KERLEX ROLLS;  Surgeon: Amadeo Turner MD;  Location:  [facility];  Service: Plastics;  Laterality: Right;    INCISION AND DRAINAGE HIP Right 2/9/2021    Procedure: HIP INCISION AND DRAINAGE;  Surgeon: Nino Carlson MD;  Location:  [facility];  Service: Orthopedics;  Laterality: Right;    INCISION AND DRAINAGE LEG Right 10/24/2021    Procedure: INCISION AND DRAINAGE LOWER EXTREMITY;  Surgeon: Amadeo Turner MD;  Location:  [facility];  Service: Plastics;  Laterality: Right;    INCISION AND DRAINAGE OF WOUND Right 7/8/2021    Procedure: INCISION AND DRAINAGE WOUND RIGHT HIP;  Surgeon: James Huntley MD;  Location:  [facility];  Service: Orthopedics;  Laterality: Right;    JOINT REPLACEMENT      KYPHOPLASTY WITH BIOPSY Bilateral 10/26/2021    Procedure: THOARCIC 12 KYPHOPLASTY WITH BIOPSY;  Surgeon: Bandar Shea MD;  Location:  [facility];  Service: Neurosurgery;  Laterality: Bilateral;    LEG DEBRIDEMENT Right 9/14/2021    Procedure: DEBRIDEMENT OF RIGHT HIP WOUND, RIGHT GLUTEAL FASCIOCUTANEOUS ADVANCEMENT FLAP AND RIGHT TENSOR FASCIAL JESSICA FLAP;  Surgeon: Amadeo Turner MD;  Location:  [facility];  Service: Plastics;  Laterality: Right;    LUMBAR DISCECTOMY Right 3/23/2021    Procedure: LUMBAR DISCECTOMY MICRO,  Lumbar 1/2 right;  Surgeon: Bandar Shea MD;  Location:  [facility];  Service: Neurosurgery;  Laterality: Right;    LUMBAR FUSION N/A 1/19/2018    Procedure: L3-4,L4-5 DECOMPRESSION, POSTERIOR SPINAL FUSION WITH INSTRUMENTATION;  Surgeon: Fortino Oropeza MD;  Location:  [facility];  Service:     LUMBAR LAMINECTOMY WITH FUSION Left 1/17/2018    Procedure: LEFT L3-4 L4-5 LATERAL LUMBAR INTERBODY FUSION;  Surgeon: Fortino Oropeza MD;  Location:  [facility];  Service:     MYRINGOTOMY W/ TUBES  09/04/2014    TUBES NO LONGER IN PLACE    OTHER SURGICAL HISTORY      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 so hardware was removed and spacers were placed    REPLACEMENT TOTAL KNEE Right      Family History   Problem Relation Age of Onset    Cancer Mother         Lung cancer    Heart disease Father         Doied of heart. Attack     Social History     Socioeconomic History    Marital status:    Tobacco Use    Smoking status: Never     Passive exposure: Never    Smokeless tobacco: Never   Vaping Use    Vaping Use: Never used   Substance and Sexual Activity    Alcohol use: No    Drug use: Never    Sexual activity: Not Currently     Birth control/protection: Abstinence     Allergies   Allergen Reactions    Atorvastatin Other (See Comments)     LEG CRAMPS      Amoxicillin Rash    Escitalopram Rash    Nabumetone Rash    Niacin Er Rash    Penicillin G Rash    Penicillins Rash    Simvastatin Rash     Current Outpatient Medications   Medication Sig Dispense Refill    acetaminophen (TYLENOL) 325 MG tablet Take 2 tablets by mouth Every 6 (Six) Hours As Needed (mild pain). Indications: Fever, Pain      apixaban (ELIQUIS) 5 MG tablet tablet Take 1 tablet by mouth 2 (Two) Times a Day. Indications: Other - full anticoagulation 180 tablet 3    aspirin 81 MG EC tablet Take 1 tablet by mouth Daily. Indications: Disease involving Lipid Deposits in the Arteries      carvedilol (COREG) 25 MG tablet Take 1 tablet by mouth 2 (Two) Times a  Day With Meals. Indications: Heart Attack 180 tablet 3    cephalexin (KEFLEX) 500 MG capsule Take 2 capsules by mouth 2 (Two) Times a Day. Indications: Infection of the Skin and/or Soft Tissue 120 capsule 3    Diclofenac Sodium (VOLTAREN) 1 % gel gel Apply 4 g topically to the appropriate area as directed 4 (Four) Times a Day As Needed. Indications: Joint Damage causing Pain and Loss of Function      DULoxetine (CYMBALTA) 60 MG capsule Take 1 capsule by mouth Daily. Indications: Major Depressive Disorder 90 capsule 3    famotidine (PEPCID) 40 MG tablet Take 1 tablet by mouth Daily. Indications: Heartburn 90 tablet 3    ferrous sulfate 324 (65 Fe) MG tablet delayed-release EC tablet Take 1 tablet by mouth Daily With Breakfast. Indications: Iron Deficiency 90 tablet 3    fesoterodine fumarate (TOVIAZ ER) 4 MG tablet sustained-release 24 hour tablet Take 1 tablet by mouth Daily. 30 tablet 11    fluticasone (FLONASE) 50 MCG/ACT nasal spray 1 spray into the nostril(s) as directed by provider Daily. 18.2 mL 5    folic acid (FOLVITE) 1 MG tablet Take 1 tablet by mouth Daily. Indications: Anemia From Inadequate Folic Acid 90 tablet 3    furosemide (LASIX) 40 MG tablet Take 1 tablet by mouth Daily. Indications: Edema 90 tablet 3    HYDROcodone-acetaminophen (NORCO) 7.5-325 MG per tablet Take 1 tablet by mouth Every 8 (Eight) Hours As Needed for Moderate Pain . (Patient taking differently: Take 1 tablet by mouth Every 8 (Eight) Hours As Needed for Moderate Pain. Takes 5-325mg every 8 hours PRN.  Indications: Pain) 9 tablet 0    isosorbide mononitrate (IMDUR) 60 MG 24 hr tablet Take 1 tablet by mouth Every Morning. Indications: Stable Angina Pectoris 90 tablet 3    leflunomide (ARAVA) 20 MG tablet Take 1 tablet by mouth every night at bedtime. Indications: Rheumatoid Arthritis 90 tablet 3    levothyroxine (SYNTHROID, LEVOTHROID) 75 MCG tablet Take 1 tablet by mouth Daily. Indications: Underactive Thyroid 90 tablet 3     Lidocaine 4 % patch Apply  topically every night at bedtime. To lower back  Indications: Arthritis Related to an Inflammatory Disorder      losartan (COZAAR) 50 MG tablet TAKE ONE TABLET BY MOUTH DAILY 30 tablet 2    magnesium hydroxide (MILK OF MAGNESIA) 400 MG/5ML suspension Take 30 mL by mouth Daily As Needed for Constipation. Indications: Constipation      Menthol-Zinc Oxide 0.45-20 % ointment Apply 1 application  topically 2 (Two) Times a Day. Apply to right buttock      multivitamin with minerals tablet tablet Take 1 tablet by mouth Daily.      nitroglycerin (Nitrostat) 0.4 MG SL tablet Place 1 tablet under the tongue Every 5 (Five) Minutes As Needed for Chest Pain. Take no more than 3 doses in 15 minutes.  12    polyethylene glycol (MIRALAX) 17 GM/SCOOP powder Take 17 g by mouth Daily. Indications: Constipation      predniSONE (DELTASONE) 5 MG tablet Take 1 tablet by mouth Daily. Indications: Acute Bursitis (Patient taking differently: Take 1 mg by mouth Daily. Indications: Acute Bursitis) 90 tablet 3    salsalate (DISALCID) 500 MG tablet 5 PILLS ON M-W-F-SUNDAY 4 PILLS ON T-TH-SAT  Indications: Rheumatoid Arthritis 384 tablet 3    sucralfate (CARAFATE) 1 g tablet Take 1 tablet by mouth 4 (Four) Times a Day Before Meals & at Bedtime. Indications: Peptic Ulcer 360 tablet 3    traMADol (ULTRAM) 50 MG tablet Take 1 tablet by mouth Every 6 (Six) Hours As Needed for Moderate Pain.      valACYclovir (VALTREX) 500 MG tablet Take 1 tablet by mouth Daily. Indications: Shingles 90 tablet 3     No current facility-administered medications for this visit.     Review of Systems   Constitutional:  Negative for chills and fever.   HENT:  Negative for congestion.    Respiratory:  Negative for shortness of breath.    Cardiovascular:  Positive for leg swelling. Negative for chest pain.   Gastrointestinal:  Negative for constipation, diarrhea, nausea and vomiting.   Musculoskeletal:  Positive for arthralgias, back pain and gait  problem.        Foot pain   Skin:  Positive for wound.   Neurological:  Positive for numbness.   Hematological:  Bruises/bleeds easily.     OBJECTIVE     Vitals:    23 1444   BP: 120/66   Pulse: 66   SpO2: 97%       PHYSICAL EXAM  GEN:   Accompanied by none.     Foot/Ankle Exam    GENERAL  Appearance:  appears stated age  Orientation:  AAOx3  Affect:  appropriate  Assistance:  wheelchair (motorized)  Right shoe gear: casual shoe  Left shoe gear: casual shoe    VASCULAR     Right Foot Vascularity   Dorsalis pedis:  2+  Posterior tibial:  2+  Skin temperature:  warm  Edema gradin+ and pitting  CFT:  3  Pedal hair growth:  Present  Varicosities:  spider veins     Left Foot Vascularity   Dorsalis pedis:  2+  Posterior tibial:  2+  Skin temperature:  warm  Edema gradin+ and pitting  CFT:  3  Pedal hair growth:  Present  Varicosities:  spider veins     NEUROLOGIC     Right Foot Neurologic   Light touch sensation: diminished  Vibratory sensation: diminished  Hot/Cold sensation: diminished  Protective Sensation using Searchlight-Ian Monofilament:   Sites intact: 10  Sites tested: 10     Left Foot Neurologic   Light touch sensation: diminished  Vibratory sensation: diminished  Hot/Cold sensation:  diminished  Protective Sensation using Searchlight-Ian Monofilament:   Sites intact: 10  Sites tested: 10    MUSCULOSKELETAL     Right Foot Musculoskeletal   Ecchymosis:  none  Tenderness:  none    Arch:  Normal  Hammertoe:  Second toe, third toe, fourth toe and fifth toe  Hallux valgus: Yes       Left Foot Musculoskeletal   Ecchymosis:  none  Tenderness:  none  Arch:  Normal  Hammertoe:  Second toe, third toe, fourth toe and fifth toe    MUSCLE STRENGTH     Right Foot Muscle Strength   Foot dorsiflexion:  4-  Foot plantar flexion:  4-  Foot inversion:  4-  Foot eversion:  4-     Left Foot Muscle Strength   Foot dorsiflexion:  4  Foot plantar flexion:  4  Foot inversion:  4  Foot eversion:  4    RANGE OF  MOTION     Right Foot Range of Motion   Foot and ankle ROM within normal limits       Left Foot Range of Motion   Foot and ankle ROM within normal limits      DERMATOLOGIC      Right Foot Dermatologic   Skin  Positive for dryness. Negative for cellulitis, erythema and ulcer.   Nails  1.  Positive for elongated, abnormal thickness and subungual debris.  2.  Positive for elongated, abnormal thickness and subungual debris.  3.  Positive for elongated, abnormal thickness and subungual debris.  4.  Positive for elongated, abnormal thickness and subungual debris.  5.  Positive for elongated, abnormal thickness and subungual debris.     Left Foot Dermatologic   Skin  Positive for dryness. Negative for ulcer.   Nails  1.  Positive for elongated, abnormal thickness and subungual debris.  2.  Positive for elongated, abnormal thickness and subungual debris.  3.  Positive for elongated, abnormal thickness and subungual debris.  4.  Positive for elongated, abnormally thick and subungual debris.  5.  Positive for elongated, abnormally thick and subungual debris.    RADIOLOGY/NUCLEAR:  No results found.    LABORATORY/CULTURE RESULTS:      PATHOLOGY RESULTS:       ASSESSMENT/PLAN     Diagnoses and all orders for this visit:    1. Thickened nail (Primary)    2. Anticoagulant long-term use    3. Wheelchair dependent    4. Functional gait abnormality    5. Hallux valgus, right    6. Near functional paraplegia      Comprehensive lower extremity examination and evaluation was performed.  Discussed findings and treatment plan including risks, benefits, and treatment options with patient in detail. Patient agreed with treatment plan.  After verbal consent obtained, nail(s) x10 debrided of length and thickness with nail nipper without incidence  Patient may maintain nails and calluses at home utilizing emery board or pumice stone between visits as needed   Elevate legs at rest.    An After Visit Summary was printed and given to the patient  at discharge, including (if requested) any available informative/educational handouts regarding diagnosis, treatment, or medications. All questions were answered to patient/family satisfaction. Should symptoms fail to improve or worsen they agree to call or return to clinic or to go to the Emergency Department. Discussed the importance of following up with any needed screening tests/labs/specialist appointments and any requested follow-up recommended by me today. Importance of maintaining follow-up discussed and patient accepts that missed appointments can delay diagnosis and potentially lead to worsening of conditions.  Return in about 3 months (around 11/8/2023) for Follow-up with Podiatry Provider, Schedule [facility]., or sooner if acute issues arise.    Lab Frequency Next Occurrence   Follow Anesthesia Guidelines / Standing Orders Once 03/02/2021   Provide NPO Instructions to Patient Once 03/07/2021   Chlorhexidine Skin Prep Once 03/07/2021   Obtain Informed Consent Once 03/03/2021       This document has been electronically signed by Robert Dickerson DPM on August 8, 2023 15:11 CDT

## 2023-08-01 ENCOUNTER — TREATMENT (OUTPATIENT)
Dept: PHYSICAL THERAPY | Facility: CLINIC | Age: 68
End: 2023-08-01
Payer: MEDICARE

## 2023-08-01 DIAGNOSIS — G82.20 LOWER PARAPLEGIA: ICD-10-CM

## 2023-08-01 DIAGNOSIS — F44.4 FUNCTIONAL NEUROLOGICAL SYMPTOM DISORDER WITH WEAKNESS OR PARALYSIS: Primary | ICD-10-CM

## 2023-08-01 PROCEDURE — 97110 THERAPEUTIC EXERCISES: CPT | Performed by: PHYSICAL THERAPIST

## 2023-08-03 ENCOUNTER — TELEPHONE (OUTPATIENT)
Dept: INTERNAL MEDICINE | Facility: CLINIC | Age: 68
End: 2023-08-03
Payer: MEDICARE

## 2023-08-03 ENCOUNTER — TREATMENT (OUTPATIENT)
Dept: PHYSICAL THERAPY | Facility: CLINIC | Age: 68
End: 2023-08-03
Payer: MEDICARE

## 2023-08-03 DIAGNOSIS — G82.20 LOWER PARAPLEGIA: Primary | ICD-10-CM

## 2023-08-03 DIAGNOSIS — G82.20 LOWER PARAPLEGIA: ICD-10-CM

## 2023-08-03 DIAGNOSIS — F44.4 FUNCTIONAL NEUROLOGICAL SYMPTOM DISORDER WITH WEAKNESS OR PARALYSIS: Primary | ICD-10-CM

## 2023-08-03 DIAGNOSIS — F44.4 FUNCTIONAL NEUROLOGICAL SYMPTOM DISORDER WITH WEAKNESS OR PARALYSIS: ICD-10-CM

## 2023-08-03 DIAGNOSIS — M06.9 RHEUMATOID ARTHRITIS INVOLVING MULTIPLE SITES, UNSPECIFIED WHETHER RHEUMATOID FACTOR PRESENT: ICD-10-CM

## 2023-08-03 PROCEDURE — 97116 GAIT TRAINING THERAPY: CPT

## 2023-08-03 PROCEDURE — 97112 NEUROMUSCULAR REEDUCATION: CPT

## 2023-08-03 PROCEDURE — 97530 THERAPEUTIC ACTIVITIES: CPT

## 2023-08-03 NOTE — PROGRESS NOTES
"                                                                Physical Therapy Treatment Note and 30 Day Progress Note  [address] [place] [address]    Patient: Tawny Shin                                                 Visit Date: 8/3/2023  :     1955    Referring practitioner:    Moises Oliveira MD  Date of Initial Visit:          Type: THERAPY  Noted: 2023    Patient seen for 11 sessions    Visit Diagnoses:    ICD-10-CM ICD-9-CM   1. Functional neurological symptom disorder with weakness or paralysis  F44.4 300.11   2. Lower paraplegia  G82.20 344.1     SUBJECTIVE     Subjective: Nothing new to report. She states that her transfers are smoother in clinic than with her caregiver.     PAIN: 0/10     OBJECTIVE     Objective       Gait Training          [id number]   Task/Terrain Asst AD Comments   TUG modA x2 FWW 6'9\" before needing to sit. W/c follow   Timed Minutes 15      Therapeutic Activities    [id number] Comments   ROM    MMT    W/c <> table transfer via stand pivot with FWW CGA   Timed Minutes 20       Neuromuscular Reeducation     [id number] Comments   Static standing w/ bathroom scales    5x STS    Timed Minutes 10      Therapy Education/Self Care 78023   Education offered today LE compression needs, HEP, home transfers   [facility] [id number]      Ongoing HEP   Date: 2023  Prepared by: Maria R Garay     Exercises  - Prone Knee Extension with Ankle Weight  - 2-3 x daily - 7 x weekly - 30-60 seconds hold  - Seated Hamstring Stretch with Chair  - 2-3 x daily - 7 x weekly - 30-60 seconds hold  - Supine Bridge  - 2 x daily - 7 x weekly - 2 sets - 10 reps  - Supine March  - 1-2 x daily - 7 x weekly - 2 sets - 10 reps  - Supine Active Straight Leg Raise  - 1-2 x daily - 7 x weekly - 2 sets - 10 reps   Timed Minutes        Total Timed Treatment:     45   mins  Total Time of Visit:             45   mins         ASSESSMENT/PLAN     GOALS  Goals                                            " "Progress Note due by 9/1/2023                                                                Recert due by 9/8/2023   LTG by: 12 weeks Comments Date Status   Patient will report compliance with HEP.  Performing ~3x/week 8/3 ongoing   Pt will be able to maintain static standing w/ 50/50 Wbing for >/=3 mins w/o UE support/a to improve capacity for ADL/IADLs 19.70 sec  39.94 sec  24.82 sec  With UE support L LE #100 R LE #45   8/3: 1'30\" 120# LLE 50-60# RLE 8/3 ongoing   Patient to perform TUG in </=2 mins w/ FWW without LOB for improved capacity for household ambulation. 6'9\" before needing to sit. W/c follow    8/3 ongoing   Pt will be able to perform 5x STS w/ 50/50 Wbing in </=2 mins for improved functional strength 17.53 sec but not EWB    34.38 sec w/ B UE support  8/3 ongoing   Improve R ankle DF to neutral Reinforced HEP  -4 from neutral.  8/3 ongoing    Improve gross B LE strength to >/=4/5 B hip flexion 4-/5, R hip abd 2+/5, L hip abd 3+/5    8/4: deferred d/t time constraints 8/3  ongoing       Assessment & Plan     Assessment  Impairments: abnormal coordination, abnormal gait, abnormal muscle firing, abnormal muscle tone, abnormal or restricted ROM, activity intolerance, impaired balance, impaired physical strength, lacks appropriate home exercise program, pain with function, safety issue and weight-bearing intolerance  Functional Limitations: carrying objects, lifting, sleeping, walking, pulling, pushing, uncomfortable because of pain, moving in bed, sitting, standing, stooping, reaching behind back, reaching overhead and unable to perform repetitive tasksPrognosis: good    Plan  Therapy options: will be seen for skilled therapy services  Planned modality interventions: thermotherapy (hydrocollator packs), cryotherapy, low level laser therapy, TENS, dry needling, electrical stimulation/Lebanese stimulation and ultrasound  Planned therapy interventions: abdominal trunk stabilization, manual therapy, ADL " retraining, motor coordination training, balance/weight-bearing training, neuromuscular re-education, body mechanics training, postural training, soft tissue mobilization, spinal/joint mobilization, fine motor coordination training, flexibility, functional ROM exercises, strengthening, gait training, stretching, home exercise program, therapeutic activities, IADL retraining, joint mobilization and transfer training  Frequency: 2x week  Duration in weeks: 12  Treatment plan discussed with: patient       ASSESSMENT: Pt was seen for progress note today. She has not yet met any goals, but performance of functional activity such as transfers and standing continue to improve as well as her independence with such activities. Her static standing has improved and today, TUG was attempted. She was able to ambulate for almost 7 feet with FWW before needing to sit and this is the farthest she has ambulated to date. She will require continued skilled physical therapy services to address unmet goals, and maximize functional capacity.     PLAN: Continue to focus on overall functional mobility including transfers and sit<>stands     SIGNATURE: Kevon Torres PT Student  Electronically Signed on 8/4/2023    The clinical instructor and/or supervising staff, Molly Garrett PT, was present in clinic guiding the visit by approving, concurring, and confirming the skilled judgement for all services rendered.    Signature:  Molly Garrett PT, [place] License #: [id number]  Electronically signed on 8/7/2023         Donnie Cisneros. [id number]  [phone]    Type 2 diabetes mellitus

## 2023-08-07 ENCOUNTER — TELEPHONE (OUTPATIENT)
Dept: PODIATRY | Facility: CLINIC | Age: 68
End: 2023-08-07
Payer: MEDICARE

## 2023-08-07 NOTE — TELEPHONE ENCOUNTER
Called patient regarding appt on 08/08/2023. Left message for patient to return call if any questions or concerns arise. 0

## 2023-08-08 ENCOUNTER — OFFICE VISIT (OUTPATIENT)
Dept: PODIATRY | Facility: CLINIC | Age: 68
End: 2023-08-08
Payer: MEDICARE

## 2023-08-08 ENCOUNTER — TREATMENT (OUTPATIENT)
Dept: PHYSICAL THERAPY | Facility: CLINIC | Age: 68
End: 2023-08-08
Payer: MEDICARE

## 2023-08-08 VITALS
BODY MASS INDEX: 28.61 KG/M2 | DIASTOLIC BLOOD PRESSURE: 66 MMHG | HEART RATE: 66 BPM | OXYGEN SATURATION: 97 % | WEIGHT: 178 LBS | HEIGHT: 66 IN | SYSTOLIC BLOOD PRESSURE: 120 MMHG

## 2023-08-08 DIAGNOSIS — G82.20 LOWER PARAPLEGIA: ICD-10-CM

## 2023-08-08 DIAGNOSIS — F44.4 FUNCTIONAL NEUROLOGICAL SYMPTOM DISORDER WITH WEAKNESS OR PARALYSIS: Primary | ICD-10-CM

## 2023-08-08 DIAGNOSIS — L60.2 THICKENED NAIL: Primary | ICD-10-CM

## 2023-08-08 DIAGNOSIS — Z99.3 WHEELCHAIR DEPENDENT: ICD-10-CM

## 2023-08-08 DIAGNOSIS — M20.11 HALLUX VALGUS, RIGHT: ICD-10-CM

## 2023-08-08 DIAGNOSIS — R26.89 FUNCTIONAL GAIT ABNORMALITY: ICD-10-CM

## 2023-08-08 DIAGNOSIS — Z79.01 ANTICOAGULANT LONG-TERM USE: ICD-10-CM

## 2023-08-08 PROCEDURE — 97530 THERAPEUTIC ACTIVITIES: CPT | Performed by: PHYSICAL THERAPIST

## 2023-08-08 PROCEDURE — 97110 THERAPEUTIC EXERCISES: CPT | Performed by: PHYSICAL THERAPIST

## 2023-08-08 NOTE — PROGRESS NOTES
Physical Therapy Treatment Note  [address] [place] [address]    Patient: Tawny Shin                                                 Visit Date: 2023  :     1955    Referring practitioner:    Moises Oliveira MD  Date of Initial Visit:          Type: THERAPY  Noted: 2023    Patient seen for 12 sessions    Visit Diagnoses:    ICD-10-CM ICD-9-CM   1. Functional neurological symptom disorder with weakness or paralysis  F44.4 300.11   2. Lower paraplegia  G82.20 344.1     SUBJECTIVE     Subjective:She reports she was sore after the last session but not as sore as the previous, reports hurting all over with arthritis       PAIN: 4/10         OBJECTIVE     Objective     Therapeutic Activities    08241 Comments   Stand pivot transfers to/from WC/mat table/Shuttle Press                    Timed Minutes 10      Therapeutic Exercises    65795 Units Comments   Manual B pec and thoracic stretches in sitting with 1/2 roller      R knee extension stretches  in sitting with manual OP     HL alt clamshells with red T band x 15      B LE shuttle press (5 cords) 3 x 10      B unilateral LE Shuttle Press 3 bands 3 x 10      Timed Minutes 30        Therapy Education/Self Care 23994   Education offered today    [facility] Code [id number]       Ongoing HEP   Date: 2023  Prepared by: Maria R Garay     Exercises  - Prone Knee Extension with Ankle Weight  - 2-3 x daily - 7 x weekly - 30-60 seconds hold  - Seated Hamstring Stretch with Chair  - 2-3 x daily - 7 x weekly - 30-60 seconds hold  - Supine Bridge  - 2 x daily - 7 x weekly - 2 sets - 10 reps  - Supine March  - 1-2 x daily - 7 x weekly - 2 sets - 10 reps  - Supine Active Straight Leg Raise  - 1-2 x daily - 7 x weekly - 2 sets - 10 reps   Timed Minutes        Total Timed Treatment:     40   mins  Total Time of Visit:             40   mins         ASSESSMENT/PLAN     GOALS  Goals             "                                Progress Note due by 9/1/2023                                                                Recert due by 9/8/2023   LTG by: 12 weeks Comments Date Status   Patient will report compliance with HEP.  Performing ~3x/week 8/3 ongoing   Pt will be able to maintain static standing w/ 50/50 Wbing for >/=3 mins w/o UE support/a to improve capacity for ADL/IADLs 19.70 sec  39.94 sec  24.82 sec  With UE support L LE #100 R LE #45   8/3: 1'30\" 120# LLE 50-60# RLE 8/3 ongoing   Patient to perform TUG in </=2 mins w/ FWW without LOB for improved capacity for household ambulation. 6'9\" before needing to sit. W/c follow    8/3 ongoing   Pt will be able to perform 5x STS w/ 50/50 Wbing in </=2 mins for improved functional strength 17.53 sec but not EWB     34.38 sec w/ B UE support  8/3 ongoing   Improve R ankle DF to neutral Reinforced HEP  -4 from neutral.  8/3 ongoing    Improve gross B LE strength to >/=4/5 B hip flexion 4-/5, R hip abd 2+/5, L hip abd 3+/5     8/4: deferred d/t time constraints 8/3  ongoing       Assessment/Plan     ASSESSMENT:   I didn't have her perform a lot of stepping or standing activities today due to her subjective report.     PLAN:   Likely we will work more in standing and take some steps during the next session.    SIGNATURE: Kentrell Lamb PTA, [place] License #: [id number]   Electronically Signed on 8/8/2023        [facility] [place] [address] [address]  [phone]    "

## 2023-08-10 ENCOUNTER — TREATMENT (OUTPATIENT)
Dept: PHYSICAL THERAPY | Facility: CLINIC | Age: 68
End: 2023-08-10
Payer: MEDICARE

## 2023-08-10 DIAGNOSIS — F44.4 FUNCTIONAL NEUROLOGICAL SYMPTOM DISORDER WITH WEAKNESS OR PARALYSIS: Primary | ICD-10-CM

## 2023-08-10 DIAGNOSIS — G82.20 LOWER PARAPLEGIA: ICD-10-CM

## 2023-08-10 PROCEDURE — 97110 THERAPEUTIC EXERCISES: CPT | Performed by: PHYSICAL THERAPIST

## 2023-08-10 NOTE — PROGRESS NOTES
Physical Therapy Treatment Note  [address] [place] [address]    Patient: Tawny Shin                                                 Visit Date: 8/10/2023  :     1955    Referring practitioner:    Moises Oliveira MD  Date of Initial Visit:          Type: THERAPY  Noted: 2023    Patient seen for 13 sessions    Visit Diagnoses:    ICD-10-CM ICD-9-CM   1. Functional neurological symptom disorder with weakness or paralysis  F44.4 300.11   2. Lower paraplegia  G82.20 344.1     SUBJECTIVE     Subjective:She reports her legs were a little sore after the last session      PAIN: 3/10         OBJECTIVE     Objective     Therapeutic Exercises    61033 Units Comments   Walk 5 ft with Rwx CGA     B LE shuttle press (5 cords) 3 x 10      B unilateral LE Shuttle Press 3 bands 3 x 10       B DF shuttle press 2 bands 3 x 10      Manual B pec and thoracic stretches in sitting with 1/2 roller     Seated mid rows with TRX Rip Trainer 2x 10      Stand for 3 min     Timed Minutes 45        Therapy Education/Self Care 55770   Education offered today    [facility] Code [id number]        Ongoing HEP   Date: 2023  Prepared by: Maria R Garay     Exercises  - Prone Knee Extension with Ankle Weight  - 2-3 x daily - 7 x weekly - 30-60 seconds hold  - Seated Hamstring Stretch with Chair  - 2-3 x daily - 7 x weekly - 30-60 seconds hold  - Supine Bridge  - 2 x daily - 7 x weekly - 2 sets - 10 reps  - Supine March  - 1-2 x daily - 7 x weekly - 2 sets - 10 reps  - Supine Active Straight Leg Raise  - 1-2 x daily - 7 x weekly - 2 sets - 10 reps   Timed Minutes        Total Timed Treatment:     45   mins  Total Time of Visit:             45   mins         ASSESSMENT/PLAN     GOALS  Goals                                            Progress Note due by 2023                                                                Recert due by 2023   LTG by: 12  "weeks Comments Date Status   Patient will report compliance with HEP.  Performing ~3x/week 8/3 ongoing   Pt will be able to maintain static standing w/ 50/50 Wbing for >/=3 mins w/o UE support/a to improve capacity for ADL/IADLs 19.70 sec  39.94 sec  24.82 sec  With UE support L LE #100 R LE #45   8/3: 1'30\" 120# LLE 50-60# RLE 8/3 ongoing   Patient to perform TUG in </=2 mins w/ FWW without LOB for improved capacity for household ambulation. 6'9\" before needing to sit. W/c follow    8/3 ongoing   Pt will be able to perform 5x STS w/ 50/50 Wbing in </=2 mins for improved functional strength 17.53 sec but not EWB     34.38 sec w/ B UE support  8/3 ongoing   Improve R ankle DF to neutral Reinforced HEP  -4 from neutral.  8/3 ongoing    Improve gross B LE strength to >/=4/5 B hip flexion 4-/5, R hip abd 2+/5, L hip abd 3+/5     8/4: deferred d/t time constraints 8/3  ongoing       Assessment/Plan     ASSESSMENT:   Her steps were a little better today but she's still limited under 10 ft.     PLAN:   Continue to focus on overall functional mobility including transfers and sit<>stands      SIGNATURE: Kentrell Lamb PTA, [place] License #: [id number]  Electronically Signed on 8/10/2023        [address] [place] [address]. [address]  [phone]    "

## 2023-08-15 ENCOUNTER — TREATMENT (OUTPATIENT)
Dept: PHYSICAL THERAPY | Facility: CLINIC | Age: 68
End: 2023-08-15
Payer: MEDICARE

## 2023-08-15 DIAGNOSIS — F44.4 FUNCTIONAL NEUROLOGICAL SYMPTOM DISORDER WITH WEAKNESS OR PARALYSIS: Primary | ICD-10-CM

## 2023-08-15 DIAGNOSIS — G82.20 LOWER PARAPLEGIA: ICD-10-CM

## 2023-08-15 PROCEDURE — 97110 THERAPEUTIC EXERCISES: CPT | Performed by: PHYSICAL THERAPIST

## 2023-08-15 NOTE — PROGRESS NOTES
Physical Therapy Treatment Note  [address] [place] [address]    Patient: Tawny Shin                                                 Visit Date: 8/15/2023  :     1955    Referring practitioner:    Moises Oliveira MD  Date of Initial Visit:          Type: THERAPY  Noted: 2023    Patient seen for 14 sessions    Visit Diagnoses:    ICD-10-CM ICD-9-CM   1. Functional neurological symptom disorder with weakness or paralysis  F44.4 300.11   2. Lower paraplegia  G82.20 344.1     SUBJECTIVE     Subjective:She reports feeling ok. She reports her elbows are bothering her a little today.       PAIN: 2/10         OBJECTIVE     Objective     Therapeutic Exercises    16582 Units Comments   Walk 5 ft with Rwx CGA      B LE shuttle press 3 bands x 4 min       B unilateral LE Shuttle Press 1 band x 3 min each     Sit<>stands x 5 timed   12.98 sec   Sit<>stands palm to palm x 5      R TKE stretches          Timed Minutes 40        Therapy Education/Self Care 03872   Education offered today    [facility] Code [email or web address]         Ongoing HEP   Date: 2023  Prepared by: Maria R Garay     Exercises  - Prone Knee Extension with Ankle Weight  - 2-3 x daily - 7 x weekly - 30-60 seconds hold  - Seated Hamstring Stretch with Chair  - 2-3 x daily - 7 x weekly - 30-60 seconds hold  - Supine Bridge  - 2 x daily - 7 x weekly - 2 sets - 10 reps  - Supine March  - 1-2 x daily - 7 x weekly - 2 sets - 10 reps  - Supine Active Straight Leg Raise  - 1-2 x daily - 7 x weekly - 2 sets - 10 reps   Timed Minutes        Total Timed Treatment:     40   mins  Total Time of Visit:             40   mins         ASSESSMENT/PLAN     GOALS  Goals                                            Progress Note due by 2023                                                                Recert due by 2023   LTG by: 12 weeks Comments Date Status   Patient will report compliance  "with HEP.  Performing ~3x/week 8/3 ongoing   Pt will be able to maintain static standing w/ 50/50 Wbing for >/=3 mins w/o UE support/a to improve capacity for ADL/IADLs 19.70 sec  39.94 sec  24.82 sec  With UE support L LE #100 R LE #45   8/3: 1'30\" 120# LLE 50-60# RLE 8/3 ongoing   Patient to perform TUG in </=2 mins w/ FWW without LOB for improved capacity for household ambulation. 6'9\" before needing to sit. W/c follow    8/3 ongoing   Pt will be able to perform 5x STS w/ 50/50 Wbing in </=2 mins for improved functional strength 12.98 sec but not EWB and with B UE support  8/3 ongoing   Improve R ankle DF to neutral Reinforced HEP  -4 from neutral.  8/3 ongoing    Improve gross B LE strength to >/=4/5 B hip flexion 4-/5, R hip abd 2+/5, L hip abd 3+/5     8/4: deferred d/t time constraints 8/3  ongoing       Assessment/Plan     ASSESSMENT:   Today we focused a little more on endurance by slightly reducing the resistance on the Shuttle Press but significantly increasing the amount of time working on it.     PLAN:   Assess her long term response to today's session and progress accordingly.    SIGNATURE: Kentrell Lamb PTA, [place] License #: [id number]  Electronically Signed on 8/15/2023        [place] [address]  [phone]    "

## 2023-08-17 ENCOUNTER — TREATMENT (OUTPATIENT)
Dept: PHYSICAL THERAPY | Facility: CLINIC | Age: 68
End: 2023-08-17
Payer: MEDICARE

## 2023-08-17 DIAGNOSIS — F44.4 FUNCTIONAL NEUROLOGICAL SYMPTOM DISORDER WITH WEAKNESS OR PARALYSIS: Primary | ICD-10-CM

## 2023-08-17 DIAGNOSIS — Z78.9 DECREASED ACTIVITIES OF DAILY LIVING (ADL): ICD-10-CM

## 2023-08-17 DIAGNOSIS — G82.20 LOWER PARAPLEGIA: ICD-10-CM

## 2023-08-17 NOTE — PROGRESS NOTES
Physical Therapy Treatment Note  [address] [place] [address]    Patient: Tawny Shin                                                 Visit Date: 2023  :     1955    Referring practitioner:    Moises Oliveira MD  Date of Initial Visit:          Type: THERAPY  Noted: 2023    Patient seen for 15 sessions    Visit Diagnoses:    ICD-10-CM ICD-9-CM   1. Functional neurological symptom disorder with weakness or paralysis  F44.4 300.11   2. Lower paraplegia  G82.20 344.1     SUBJECTIVE     Subjective: She reports her knees were sore after the last session.     PAIN: 1/10 in the knees     OBJECTIVE     Objective     Therapeutic Exercises    34637 Units Comments   Sit to stand x2  Remains in standing for >1 minute   Standing balance reaching for cones 3x10 To discourage reliance on Ues through FWW   Modified situps from blue wedge 3x10 With 2-6# ball   Seated EOM trunk rotation with 1# ball  No LE support   Seated EOM marches  Vcs to reduce posterior lean   Timed Minutes 20     Therapeutic Activities    30250 Comments   Transfer w/c<>mat table    Sit to stand x2 Standing for >1 min at a time   Timed Minutes 15      Therapy Education/Self Care 46512   Education offered today Changing status of RLE wound, recommended discussion with MD [facility] Code [email or web address]         Ongoing HEP   Date: 2023  Prepared by: Maria R Garay     Exercises  - Prone Knee Extension with Ankle Weight  - 2-3 x daily - 7 x weekly - 30-60 seconds hold  - Seated Hamstring Stretch with Chair  - 2-3 x daily - 7 x weekly - 30-60 seconds hold  - Supine Bridge  - 2 x daily - 7 x weekly - 2 sets - 10 reps  - Supine March  - 1-2 x daily - 7 x weekly - 2 sets - 10 reps  - Supine Active Straight Leg Raise  - 1-2 x daily - 7 x weekly - 2 sets - 10 reps   Timed Minutes 10       Total Timed Treatment:     45   mins  Total Time of Visit:             45   mins        "  ASSESSMENT/PLAN     GOALS  Goals                                            Progress Note due by 9/1/2023                                                                Recert due by 9/8/2023   LTG by: 12 weeks Comments Date Status   Patient will report compliance with HEP.  Performing ~3x/week 8/3 ongoing   Pt will be able to maintain static standing w/ 50/50 Wbing for >/=3 mins w/o UE support/a to improve capacity for ADL/IADLs 19.70 sec  39.94 sec  24.82 sec  With UE support L LE #100 R LE #45   8/3: 1'30\" 120# LLE 50-60# RLE 8/3 ongoing   Patient to perform TUG in </=2 mins w/ FWW without LOB for improved capacity for household ambulation. 6'9\" before needing to sit. W/c follow    8/3 ongoing   Pt will be able to perform 5x STS w/ 50/50 Wbing in </=2 mins for improved functional strength 12.98 sec but not EWB and with B UE support     Practiced limiting UE support through FWW today 8/17 ongoing   Improve R ankle DF to neutral Reinforced HEP  -4 from neutral.  8/3 ongoing    Improve gross B LE strength to >/=4/5 B hip flexion 4-/5, R hip abd 2+/5, L hip abd 3+/5     8/4: deferred d/t time constraints 8/3  ongoing       Assessment/Plan     ASSESSMENT: Attempted standing again today with an emphasis on limiting UE support. Therapist has been monitoring wound on lateral R ankle and noticed changes over the last few weeks from dry to wet and it was recommended that pt follow up with MD. She has first OT visit after today's session. Transfers have much improved but she still demonstrates some impulsivity when sitting.    PLAN: Assess her long term response to today's session and progress accordingly.    SIGNATURE: Kevon Torres PT Student  Electronically Signed on 8/17/2023    The clinical instructor and/or supervising staff, Molly Garrett PT, was present in clinic guiding the visit by approving, concurring, and confirming the skilled judgement for all services rendered.    Signature:  Molly Heck " [place], [place] License #: [id number]  Electronically signed on 8/17/2023         [address]  [place] [address]  [phone]

## 2023-08-17 NOTE — PROGRESS NOTES
Occupational Therapy Initial Evaluation and Plan of Care  [address] [place] [address]    Patient: Tawny Shin   : 1955  Referring practitioner: Moises Oliveira MD  Date of Initial Visit: 2023  Today's Date: 2023  Patient seen for 1 sessions    Visit Diagnoses:    ICD-10-CM ICD-9-CM   1. Functional neurological symptom disorder with weakness or paralysis  F44.4 300.11   2. Lower paraplegia  G82.20 344.1   3. 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    Elevated cholesterol     Eustachian tube dysfunction     Heart disease     Herpes simplex     History of transfusion     Hyperlipidemia     Hypertension     Hypothyroidism 2020    Intrinsic asthma 2020    Knee dislocation     Labral tear of right hip joint     Laryngitis sicca     Laryngitis, chronic     Left carotid bruit 2016    MI (myocardial infarction)     Myalgia due to statin 2019    Open wound of right hip 2021    Osteomyelitis of right femur 2021    Otorrhea     Pacemaker 2016    Primary osteoarthritis of left knee 2020    Psoriasis vulgaris 2020    S/P coronary artery stent placement 2016    Sensorineural hearing loss     Seropositive rheumatoid arthritis of multiple sites 2019    Overview:   "-myochrysine '93-'96 -methotrexate '96--->11/98;r/s  restarted 2/99--> 8/14 (anemia) -sulfasalazine- not effective -penicillamine 6/98-->10/98; no effect -leflunomide 11/98--> - Humira '13-->didn't take - Enbrel 12/14-->3/15- no effect!   Last Assessment & Plan:  - \"aching all over\" because she had to be off her anti-rheumatic drugs for 2 weeks in preparation for her R knee surgery - he    Sick sinus syndrome 12/27/2019    Sjogren's disease     Spondylolisthesis of lumbar region 01/17/2018    Syncope, recurrent 02/08/2021    Urinary tract infection      Past Surgical History:   Procedure Laterality Date    A-V CARDIAC PACEMAKER INSERTION  2016    ATRIAL CARDIAC PACEMAKER INSERTION      CARDIAC CATHETERIZATION      CATARACT EXTRACTION      CERVICAL CORPECTOMY N/A 3/3/2021    Procedure: CERVICAL 6 CORPECTOMY WITH TITANIUM CAGE WITH NEURO MONITORING;  Surgeon: Bandar Shea MD;  Location:  [facility];  Service: Neurosurgery;  Laterality: N/A;    COLONOSCOPY  11/08/2011    One fold in the ascending colon which showed ulcer otherwise normal exam    COLONOSCOPY  11/12/2004    Normal exam repeat in five years    CORONARY ANGIOPLASTY WITH STENT PLACEMENT      X 2; 2013 & 2014    ENDOSCOPY  07/10/2014    Normal exam    FLAP LEG Right 9/14/2021    Procedure: RIGHT GLUTEAL FASCIOCUTANEOUS ADVANCEMENT FLAP AND RIGHT TENSOR FASCIAL JESSICA FLAP;  Surgeon: Amadeo Turner MD;  Location:  [facility];  Service: Plastics;  Laterality: Right;    HIP ABDUCTION TENOTOMY BILATERAL Right 1/14/2021    Procedure: RIGHT HIP GLUTEUS MEDLUS / MINIMUS REPAIR, POSSIBLE ACHILLES ALLOGRAFT;  Surgeon: Nino Carlson MD;  Location:  [facility];  Service: Orthopedics;  Laterality: Right;    INCISION AND DRAINAGE ABSCESS Right 6/4/2022    Procedure: INCISION AND DRAINAGE ABSCESS right hip;  Surgeon: Magda Salcido MD;  Location:  [facility];  Service: General;  Laterality: Right;    INCISION AND DRAINAGE ABSCESS Right 6/10/2022 "    Procedure: RIGHT HIP INCISION AND DRAINAGE. MD NEEDS 3L VANC IRRIGATION, CURRETTES, DAICANS, KERLEX ROLLS;  Surgeon: Amadeo Turner MD;  Location:  [facility];  Service: Plastics;  Laterality: Right;    INCISION AND DRAINAGE HIP Right 2/9/2021    Procedure: HIP INCISION AND DRAINAGE;  Surgeon: Nino Carlson MD;  Location:  [facility];  Service: Orthopedics;  Laterality: Right;    INCISION AND DRAINAGE LEG Right 10/24/2021    Procedure: INCISION AND DRAINAGE LOWER EXTREMITY;  Surgeon: Amadeo Turner MD;  Location:  [facility];  Service: Plastics;  Laterality: Right;    INCISION AND DRAINAGE OF WOUND Right 7/8/2021    Procedure: INCISION AND DRAINAGE WOUND RIGHT HIP;  Surgeon: James Huntley MD;  Location:  [facility];  Service: Orthopedics;  Laterality: Right;    JOINT REPLACEMENT      KYPHOPLASTY WITH BIOPSY Bilateral 10/26/2021    Procedure: THOARCIC 12 KYPHOPLASTY WITH BIOPSY;  Surgeon: Bandar Shea MD;  Location:  [facility];  Service: Neurosurgery;  Laterality: Bilateral;    LEG DEBRIDEMENT Right 9/14/2021    Procedure: DEBRIDEMENT OF RIGHT HIP WOUND, RIGHT GLUTEAL FASCIOCUTANEOUS ADVANCEMENT FLAP AND RIGHT TENSOR FASCIAL JESSICA FLAP;  Surgeon: Amadeo Turner MD;  Location:  [facility];  Service: Plastics;  Laterality: Right;    LUMBAR DISCECTOMY Right 3/23/2021    Procedure: LUMBAR DISCECTOMY MICRO, Lumbar 1/2 right;  Surgeon: Bandar Shea MD;  Location:  [facility];  Service: Neurosurgery;  Laterality: Right;    LUMBAR FUSION N/A 1/19/2018    Procedure: L3-4,L4-5 DECOMPRESSION, POSTERIOR SPINAL FUSION WITH INSTRUMENTATION;  Surgeon: Fortino Oropeza MD;  Location:  [facility];  Service:     LUMBAR LAMINECTOMY WITH FUSION Left 1/17/2018    Procedure: LEFT L3-4 L4-5 LATERAL LUMBAR INTERBODY FUSION;  Surgeon: Fortino Oropeza MD;  Location:  [facility];  Service:     MYRINGOTOMY W/ TUBES  09/04/2014    TUBES NO LONGER IN PLACE    OTHER SURGICAL HISTORY      total knee was  infected twice so hardware was removed and spacers were placed    REPLACEMENT TOTAL KNEE Right          SUBJECTIVE     Subjective Evaluation    History of Present Illness  Onset date: 2 years ago.  Mechanism of injury: Pt reports falling from her bed 2 years ago and breaking her back. Since that time she has had difficulty moving her R LE. She spent a year in a SNF then went home with home health. She now has 24 hour caregivers and is using a power w/c. She does very little IADL tasks and needs assistance for most ADL. She is not driving. She has a R knee spacer and needs a L knee replacement. She has been working with PT for mobility. She is hoping OT can help with ADL and IADL independence.       Patient Occupation: Retired    Precautions and Work Restrictions: Fall riskQuality of life: good    Pain  Location: Has B knee pain daily.    Social Support  Lives in: one-story house  Lives with: 24 hour caregivers.    Hand dominance: right    Treatments  Current treatment: physical therapy  Patient Goals  Patient goals for therapy: decreased pain, improved balance, increased motion, increased strength and independence with ADLs/IADLs         Outcome Measure:   9 Hole Peg Test: Left: 35.41 Right: 29.09    Modified Barthel Index: 37/100, 63% impaired with self-care.     PT G-Codes  Outcome Measure Options: Quick DASH  Quick DASH Score: 34.09% impaired with UE use.     OBJECTIVE      Objective          Strength/Myotome Testing     Left Wrist/Hand      (2nd hand position)     Trial 1: 14 lbs    Trial 2: 15 lbs    Trial 3: 14 lbs    Average: 14.33 lbs    Right Wrist/Hand      (2nd hand position)     Trial 1: 27 lbs    Trial 2: 25 lbs    Trial 3: 25 lbs    Average: 25.67 lbs    Ambulation     Comments   Is not walking. Has recently started doing stand pivot transfers with a walker instead of the sliding board. Min A for most ADL transfers.     Functional Assessment     Comments  ADL  Dressing: Min A UB.  Mod to Max A for LB.   Bathing: Min A.  Toileting: Min A for transfer. Max A for clothing. Mod A for hygiene.   Grooming: Mod I after set-up.   Feeding: Independent after set-up.    IADL  Caregivers are doing IADL for pt. She does assist some with cooking and shopping at w/c level.        General Comments     Shoulder Comments   B UE AROM is WFL.   4/5 strength at B UE.      Vision assessment: normal and wears corrected lenses/contact for reading.    Cognitive status: oriented to Person, Place, Time, and Situation    Sensation: Reports tingling in toes. No other deficits noted.    Tone: Grossly Normal at B UE.     Midline orientation: Midline    Fine Motor:   Unable to oppose L small finger. Reports hx of arthritis affecting the L small and ring fingers.     Wheelchair Training:  Independent with power w/c mobility.           Therapy Education/Self Care [id number]   Education offered today Deficits noted, benefits of OT and POC.    [employer] Code    Ongoing HEP   W/C push ups and modified w/c sit ups for core strengthening and to improve transfers.     Timed Minutes        Therapeutic Exercises    02608 Units Comments   W/C push ups.      Modified w/c sit ups with back rest reclined.      Focusing on completing ADL with less assistance as part of HEP.                Timed Minutes 10         Total Timed Treatment:     10   mins  Total Time of Visit:            40   mins    ASSESSMENT/PLAN     GOALS:  Goals                                          Progress Note due by 9/16/23                                                      Recert due by 11/14/23   STG by: 9/16/23 Comments Date Status   Pt will be independent with daily completion of a HEP to address B UE and core strength deficits.        Pt will complete all aspects of LB dressing and toileting with Mod I.       Pt will increase B UE strength to 5/5 at all joints to increase ADL and IADL independence.             LTG by: 9/16/23      Pt will complete all functional  ADL transfers with Mod I and good safety.       Pt will complete simulated household IADL tasks in sitting or standing when possible displaying Mod I and good safety.       Pt will increase B hand  strength to 30# for improved performance with ADL and IADL tasks.                                Assessment & Plan       Assessment  Impairments: abnormal coordination, abnormal or restricted ROM, activity intolerance, impaired balance, impaired physical strength, lacks appropriate home exercise program, pain with function, safety issue and weight-bearing intolerance   Functional limitations: carrying objects, lifting, walking, pulling, pushing, uncomfortable because of pain, moving in bed, standing, stooping and reaching overhead   Assessment details: This pt was referred to OT services due to a decline in ADL and IADL independence and mobility. She is currently needing 24 hour caregivers. She is struggling with standing and transfers. She is unable to walk or perform IADL tasks in standing. She shows UE weakness and impaired coordination. She does show good rehab potential to increase independence with daily tasks and decrease the need for caregivers with ADL. OT will focus on these areas to allow her to reach her max level of function.   Prognosis: good    Plan  Planned therapy interventions: IADL retraining, therapeutic activities, ADL retraining, balance/weight-bearing training, body mechanics training, fine motor coordination training, functional ROM exercises, home exercise program, manual therapy, motor coordination training, neuromuscular re-education, strengthening, abdominal trunk stabilization, flexibility, stretching and transfer training  Frequency: 2x week  Duration in weeks: 12  Treatment plan discussed with: patient  Plan details: Will address deficits in UE strength, coordination, balance and functional mobility to improve performance of ADL and IADL tasks.       SIGNATURE: Carolee Greer OTR/L,  [place] License #: [id number]    Electronically Signed on 8/17/2023    Initial Certification  Certification Period: 8/17/2023 through 11/14/2023  I certify that the therapy services are furnished while this patient is under my care.  The services outlined above are required by this patient, and will be reviewed every 90 days.     PHYSICIAN: Moises Oliveira MD (NPI: [id number])    Signature: _______________________________________DATE: _________    Please sign and return via fax to [phone].   Thank you so much for letting us work with Tawny. I appreciate your letting us work with your patients. If you have any questions or concerns, please don't hesitate to contact me.          [address] [id number]  [phone]

## 2023-08-21 ENCOUNTER — TELEPHONE (OUTPATIENT)
Age: 68
End: 2023-08-21
Payer: MEDICARE

## 2023-08-22 ENCOUNTER — TREATMENT (OUTPATIENT)
Dept: PHYSICAL THERAPY | Facility: CLINIC | Age: 68
End: 2023-08-22
Payer: MEDICARE

## 2023-08-22 ENCOUNTER — APPOINTMENT (OUTPATIENT)
Dept: GENERAL RADIOLOGY | Facility: HOSPITAL | Age: 68
End: 2023-08-22
Payer: MEDICARE

## 2023-08-22 ENCOUNTER — HOSPITAL ENCOUNTER (EMERGENCY)
Facility: HOSPITAL | Age: 68
Discharge: HOME | End: 2023-08-22
Attending: EMERGENCY MEDICINE | Admitting: EMERGENCY MEDICINE
Payer: MEDICARE

## 2023-08-22 ENCOUNTER — APPOINTMENT (OUTPATIENT)
Dept: CT IMAGING | Facility: HOSPITAL | Age: 68
End: 2023-08-22
Payer: MEDICARE

## 2023-08-22 VITALS
WEIGHT: 178 LBS | DIASTOLIC BLOOD PRESSURE: 66 MMHG | TEMPERATURE: 97.7 F | BODY MASS INDEX: 28.61 KG/M2 | OXYGEN SATURATION: 94 % | HEART RATE: 64 BPM | RESPIRATION RATE: 14 BRPM | SYSTOLIC BLOOD PRESSURE: 151 MMHG | HEIGHT: 66 IN

## 2023-08-22 DIAGNOSIS — S51.011A SKIN TEAR OF RIGHT ELBOW WITHOUT COMPLICATION, INITIAL ENCOUNTER: ICD-10-CM

## 2023-08-22 DIAGNOSIS — S00.03XA HEMATOMA OF SCALP, INITIAL ENCOUNTER: ICD-10-CM
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
## 2023-08-22 DIAGNOSIS — S40.011A CONTUSION OF RIGHT SHOULDER, INITIAL ENCOUNTER: ICD-10-CM

## 2023-08-22 DIAGNOSIS — G82.20 LOWER PARAPLEGIA: ICD-10-CM

## 2023-08-22 DIAGNOSIS — F44.4 FUNCTIONAL NEUROLOGICAL SYMPTOM DISORDER WITH WEAKNESS OR PARALYSIS: Primary | ICD-10-CM

## 2023-08-22 PROCEDURE — 72128 CT CHEST SPINE W/O DYE: CPT

## 2023-08-22 PROCEDURE — 70450 CT HEAD/BRAIN W/O DYE: CPT

## 2023-08-22 PROCEDURE — 99284 EMERGENCY DEPT VISIT MOD MDM: CPT

## 2023-08-22 PROCEDURE — 72125 CT NECK SPINE W/O DYE: CPT

## 2023-08-22 PROCEDURE — 97110 THERAPEUTIC EXERCISES: CPT | Performed by: PHYSICAL THERAPIST

## 2023-08-22 PROCEDURE — 73030 X-RAY EXAM OF SHOULDER: CPT

## 2023-08-22 PROCEDURE — 73110 X-RAY EXAM OF WRIST: CPT

## 2023-08-22 PROCEDURE — 73080 X-RAY EXAM OF ELBOW: CPT

## 2023-08-22 PROCEDURE — 97530 THERAPEUTIC ACTIVITIES: CPT | Performed by: PHYSICAL THERAPIST

## 2023-08-22 NOTE — ED NOTES
Pt. Arrived via EMS with complaints of a fall. Pt was over at [facility] rehab when they fell out of their wheelchair onto their right side. Pt. Has a golf ball size hematoma to the right side of their head. Pt. Denies LOC.  
no
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
## 2023-08-22 NOTE — PROGRESS NOTES
Physical Therapy Treatment Note  [address] [place] [address]    Patient: Tawny Shin                                                 Visit Date: 2023  :     1955    Referring practitioner:    Moises Oliveira MD  Date of Initial Visit:          Type: THERAPY  Noted: 2023    Patient seen for 16 sessions    Visit Diagnoses:    ICD-10-CM ICD-9-CM   1. Functional neurological symptom disorder with weakness or paralysis  F44.4 300.11   2. Lower paraplegia  G82.20 344.1     SUBJECTIVE     Subjective:She reports her knees are bothering her but no other issues       PAIN: 2/10         OBJECTIVE     Objective     Therapeutic Activities    99360 Comments   Stand pivot transfers with Rwx x 3 shuttle press>WC<>mat table                    Timed Minutes 10      Therapeutic Exercises    83934 Units Comments   Walk 7 ft with Rwx CGA      B LE shuttle press 6 bands 3 x 10       B unilateral LE Shuttle Press 4 bands 3 x 10       B SL clamshells x 20 with red T band     HL ball press with 45 cm SB x 12               Timed Minutes 35        Therapy Education/Self Care 40903   Education offered today    [facility] Code [id number]          Ongoing HEP     Date: 2023  Prepared by: Maria R Garay     Exercises  - Prone Knee Extension with Ankle Weight  - 2-3 x daily - 7 x weekly - 30-60 seconds hold  - Seated Hamstring Stretch with Chair  - 2-3 x daily - 7 x weekly - 30-60 seconds hold  - Supine Bridge  - 2 x daily - 7 x weekly - 2 sets - 10 reps  - Supine March  - 1-2 x daily - 7 x weekly - 2 sets - 10 reps  - Supine Active Straight Leg Raise  - 1-2 x daily - 7 x weekly - 2 sets - 10 reps   Timed Minutes        Total Timed Treatment:     45   mins  Total Time of Visit:             45   mins         ASSESSMENT/PLAN     GOALS  Goals                                            Progress Note due by 2023                                           "                      Recert due by 9/8/2023   LTG by: 12 weeks Comments Date Status   Patient will report compliance with HEP.  Performing ~3x/week 8/3 ongoing   Pt will be able to maintain static standing w/ 50/50 Wbing for >/=3 mins w/o UE support/a to improve capacity for ADL/IADLs 19.70 sec  39.94 sec  24.82 sec  With UE support L LE #100 R LE #45   8/3: 1'30\" 120# LLE 50-60# RLE 8/3 ongoing   Patient to perform TUG in </=2 mins w/ FWW without LOB for improved capacity for household ambulation. 6'9\" before needing to sit. W/c follow    8/3 ongoing   Pt will be able to perform 5x STS w/ 50/50 Wbing in </=2 mins for improved functional strength 12.98 sec but not EWB and with B UE support      Practiced limiting UE support through FWW today 8/17 ongoing   Improve R ankle DF to neutral Reinforced HEP  -4 from neutral.  8/3 ongoing    Improve gross B LE strength to >/=4/5 B hip flexion 4-/5, R hip abd 2+/5, L hip abd 3+/5     8/4: deferred d/t time constraints 8/3  ongoing       Assessment/Plan     ASSESSMENT:   She walked a little further today and we added more resistance on the Shuttle Press    PLAN:   Assess her long term response and progress accordingly     SIGNATURE: Kentrell Lamb PTA, [place] License #: [id number]  Electronically Signed on 8/22/2023        [facility] [address]  [place] [address]  [phone]   "

## 2023-08-24 ENCOUNTER — TREATMENT (OUTPATIENT)
Dept: PHYSICAL THERAPY | Facility: CLINIC | Age: 68
End: 2023-08-24
Payer: MEDICARE

## 2023-08-24 DIAGNOSIS — G82.20 LOWER PARAPLEGIA: ICD-10-CM

## 2023-08-24 DIAGNOSIS — F44.4 FUNCTIONAL NEUROLOGICAL SYMPTOM DISORDER WITH WEAKNESS OR PARALYSIS: Primary | ICD-10-CM
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
## 2023-08-28 ENCOUNTER — OFFICE VISIT (OUTPATIENT)
Dept: INTERNAL MEDICINE | Facility: CLINIC | Age: 68
End: 2023-08-28
Payer: MEDICARE

## 2023-08-28 VITALS
HEIGHT: 66 IN | SYSTOLIC BLOOD PRESSURE: 120 MMHG | DIASTOLIC BLOOD PRESSURE: 68 MMHG | WEIGHT: 178 LBS | BODY MASS INDEX: 28.61 KG/M2 | TEMPERATURE: 97.4 F | OXYGEN SATURATION: 96 % | HEART RATE: 71 BPM

## 2023-08-28 DIAGNOSIS — G89.4 CHRONIC PAIN SYNDROME: ICD-10-CM

## 2023-08-28 DIAGNOSIS — Z79.01 CHRONIC ANTICOAGULATION: Chronic | ICD-10-CM

## 2023-08-28 DIAGNOSIS — G82.20 LOWER PARAPLEGIA: ICD-10-CM

## 2023-08-28 DIAGNOSIS — T14.8XXA HEMATOMA: ICD-10-CM

## 2023-08-28 DIAGNOSIS — I87.2 VENOUS INSUFFICIENCY: Chronic | ICD-10-CM

## 2023-08-28 DIAGNOSIS — R07.89 RIGHT-SIDED CHEST WALL PAIN: ICD-10-CM

## 2023-08-28 DIAGNOSIS — L89.619 PRESSURE INJURY OF SKIN OF RIGHT HEEL, UNSPECIFIED INJURY STAGE: ICD-10-CM
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
## 2023-08-28 PROBLEM — G92.9 ENCEPHALOPATHY, TOXIC: Status: RESOLVED | Noted: 2022-06-10 | Resolved: 2023-08-28

## 2023-08-28 PROBLEM — M54.6 ACUTE BILATERAL THORACIC BACK PAIN: Status: RESOLVED | Noted: 2021-10-21 | Resolved: 2023-08-28

## 2023-08-28 RX ORDER — METHYLPREDNISOLONE SODIUM SUCCINATE 40 MG/ML
40 INJECTION, POWDER, LYOPHILIZED, FOR SOLUTION INTRAMUSCULAR; INTRAVENOUS ONCE
Status: COMPLETED | OUTPATIENT
Start: 2023-08-28 | End: 2023-08-28

## 2023-08-28 RX ORDER — TIZANIDINE HYDROCHLORIDE 2 MG/1
2 CAPSULE, GELATIN COATED ORAL 2 TIMES DAILY PRN
Qty: 20 CAPSULE | Refills: 0 | Status: SHIPPED | OUTPATIENT
Start: 2023-08-28

## 2023-08-28 RX ADMIN — METHYLPREDNISOLONE SODIUM SUCCINATE 40 MG: 40 INJECTION, POWDER, LYOPHILIZED, FOR SOLUTION INTRAMUSCULAR; INTRAVENOUS at 11:21

## 2023-08-28 NOTE — PROGRESS NOTES
"      Chief Complaint  Follow-up (ER for fall on 08/23/2023: check in note: Patient was in her wheelchair today and hit a curb and fell to the ground hitting the right side of her head. She denies any loss of consciousness but does take Eliquis. She now has a large hematoma to her right head. Patient complains of pain to her upper back and right shoulder. She is up-to-date on her tetanus immunization. /Still has right side pain and dizziness and bump on head ) and foot issue (Bottom of right foot )    Subjective        Tawny Shin presents to [facility]    HPI  Patient here for the above problems.  See Assessment and Plan for further HPI components.        Review of Systems    Objective   Vital Signs:  /68 (BP Location: Left arm, Patient Position: Sitting, Cuff Size: Adult)   Pulse 71   Temp 97.4 øF (36.3 øC) (Temporal)   Ht 167.6 cm (66\")   Wt 80.7 kg (178 lb)   SpO2 96%   BMI 28.73 kg/mý   Estimated body mass index is 28.73 kg/mý as calculated from the following:    Height as of this encounter: 167.6 cm (66\").    Weight as of this encounter: 80.7 kg (178 lb).      Physical Exam  Vitals reviewed.   Constitutional:       Appearance: She is not ill-appearing.   HENT:      Head: Normocephalic and atraumatic.     Eyes:      General: No scleral icterus.     Conjunctiva/sclera: Conjunctivae normal.   Cardiovascular:      Rate and Rhythm: Normal rate.      Heart sounds: Murmur heard.   Pulmonary:      Effort: Pulmonary effort is normal. No respiratory distress.   Musculoskeletal:      Left lower leg: Edema present.   Skin:     General: Skin is warm and dry.          Neurological:      Mental Status: She is alert and oriented to person, place, and time.   Psychiatric:         Behavior: Behavior normal.                       Assessment and Plan   Diagnoses and all orders for this visit:    1. Fall, subsequent encounter (Primary)  -     TiZANidine (Zanaflex) 2 MG capsule; Take " 1 capsule by mouth 2 (Two) Times a Day As Needed for Muscle Spasms.  Dispense: 20 capsule; Refill: 0  -     methylPREDNISolone sodium succinate (SOLU-Medrol) injection 40 mg    2. Hematoma    3. Chronic pain syndrome    4. Near functional paraplegia    5. Pressure injury of skin of right heel, unspecified injury stage    6. Venous insufficiency    7. Chronic anticoagulation    8. Right-sided chest wall pain      Patient took her motorized wheelchair around a corner, and subsequently tipped over.  Patient hit her head.  Patient did not lose consciousness to her knowledge.  Patient is on chronic anticoagulation.  Patient has near functional paraplegia, and ambulate or make for transfers or on her own.    Patient is continue to have dizziness.  Patient has had no other motor or sensory changes.  Patient has had no change in her cognition.  Do not think that a repeat CT scan is warranted at the present time.  Discussed red flag symptoms to watch for.    Patient continues to have some muscle tightness from the fall.  No acute source of the pain.  Patient taking her tramadol.  Patient denies any issues with this.  Patient has never taken a muscle relaxer to her knowledge.  We will try low-dose Zanaflex.    Patient having look at a previous pressure injury on the side of her heel.  This appears to be healing well.  Told her to continue to offload it.  It is nearly fully intact.  There is a little bit of scab on it that I was able to gently touch and it came off.  The skin under it is clean dry and intact.  Do not suspect that she needs any further wound care on this lesion above her medial malleolus.    Patient does have continued venous insufficiency, elevate extremities as tolerated.  Continue as needed diuretics and monitor volume status closely.    Patient has a hematoma on her right frontal part of her head.  She indicates that it is continuing to improve.    Result Review :  The following data was reviewed by: Moises  Jesus Manuel Oliveira MD on 08/28/2023:    Data reviewed : Radiologic studies reviewed as below.  No severe acute findings.   CT Thoracic Spine Without Contrast (08/22/2023 16:55)  CT Cervical Spine Without Contrast (08/22/2023 16:55)  CT Head Without Contrast (08/22/2023 16:55)  XR Wrist 3+ View Right (08/22/2023 15:35)  XR Elbow 3+ View Right (08/22/2023 15:35)  XR Shoulder 2+ View Right (08/22/2023 15:35)                Follow Up   Return if symptoms worsen or fail to improve, for Next scheduled follow up.  Patient was given instructions and counseling regarding her condition or for health maintenance advice. Please see specific information pulled into the AVS if appropriate.       PHILL Oliveira MD, FACP, FH      Electronically signed by Moises Oliveira MD, 08/28/23, 11:09 AM CDT.

## 2023-08-29 ENCOUNTER — TREATMENT (OUTPATIENT)
Dept: PHYSICAL THERAPY | Facility: CLINIC | Age: 68
End: 2023-08-29
Payer: MEDICARE

## 2023-08-29 DIAGNOSIS — Z78.9 DECREASED ACTIVITIES OF DAILY LIVING (ADL): ICD-10-CM

## 2023-08-29 DIAGNOSIS — F44.4 FUNCTIONAL NEUROLOGICAL SYMPTOM DISORDER WITH WEAKNESS OR PARALYSIS: Primary | ICD-10-CM

## 2023-08-29 DIAGNOSIS — G82.20 LOWER PARAPLEGIA: ICD-10-CM

## 2023-08-29 NOTE — PROGRESS NOTES
Physical Therapy Treatment Note  [address] [place] [address]    Patient: Tawny Shin                                                 Visit Date: 2023  :     1955    Referring practitioner:    Moises Oliveira MD  Date of Initial Visit:          Type: THERAPY  Noted: 2023    Patient seen for 18 sessions    Visit Diagnoses:    ICD-10-CM ICD-9-CM   1. Functional neurological symptom disorder with weakness or paralysis  F44.4 300.11   2. Lower paraplegia  G82.20 344.1     SUBJECTIVE     Subjective:She states she gets dizzy in the mornings when she gets up but yesterday she had a few spells in the day. States pain in R side      PAIN: 4/10         OBJECTIVE     Objective     Therapeutic Activities    85561 Comments   Stand pivot transfers with Rwx x 3 shuttle press>WC<>mat table                             Timed Minutes 10       Therapeutic Exercises    99196 Units Comments   131/65 in sitting and 148/71 in standing BP     Walk 7 ft with Rwx CGA      B LE shuttle press 6 bands 3 x 10        B unilateral LE Shuttle Press 4 bands 3 x 10       B SL clamshells x 20 with red T band                Timed Minutes 30      Therapy Education/Self Care 75193   Education offered today    [facility] Code [id number]           Ongoing HEP   Date: 2023  Prepared by: Maria R Garay     Exercises  - Prone Knee Extension with Ankle Weight  - 2-3 x daily - 7 x weekly - 30-60 seconds hold  - Seated Hamstring Stretch with Chair  - 2-3 x daily - 7 x weekly - 30-60 seconds hold  - Supine Bridge  - 2 x daily - 7 x weekly - 2 sets - 10 reps  - Supine March  - 1-2 x daily - 7 x weekly - 2 sets - 10 reps  - Supine Active Straight Leg Raise  - 1-2 x daily - 7 x weekly - 2 sets - 10 reps   Timed Minutes        Total Timed Treatment:     40   mins  Total Time of Visit:             40   mins         ASSESSMENT/PLAN     GOALS  Goals                             "                Progress Note due by 9/1/2023                                                                Recert due by 9/8/2023   LTG by: 12 weeks Comments Date Status   Patient will report compliance with HEP.  Performing ~3x/week 8/3 ongoing   Pt will be able to maintain static standing w/ 50/50 Wbing for >/=3 mins w/o UE support/a to improve capacity for ADL/IADLs 19.70 sec  39.94 sec  24.82 sec  With UE support L LE #100 R LE #45   8/3: 1'30\" 120# LLE 50-60# RLE 8/3 ongoing   Patient to perform TUG in </=2 mins w/ FWW without LOB for improved capacity for household ambulation. 6'9\" before needing to sit. W/c follow    8/3 ongoing   Pt will be able to perform 5x STS w/ 50/50 Wbing in </=2 mins for improved functional strength 12.98 sec but not EWB and with B UE support      Practiced limiting UE support through FWW today 8/17 ongoing   Improve R ankle DF to neutral Reinforced HEP  -4 from neutral.  8/3 ongoing    Improve gross B LE strength to >/=4/5 B hip flexion 4-/5, R hip abd 2+/5, L hip abd 3+/5     8/4: deferred d/t time constraints 8/3  ongoing       Assessment/Plan     ASSESSMENT:   She is still pretty bruised and sore from her fall last week but she did well. Her issue is dizziness right now as well as fatigue    PLAN:   Review all goals for a progress note.    SIGNATURE: Kentrell Lamb PTA, [place] License #: [id number]  Electronically Signed on 8/29/2023        [address]. [id number]  [phone]   "

## 2023-08-29 NOTE — PROGRESS NOTES
Occupational Therapy Treatment Note  [address] [place] [address]    Patient: Tawny Shin                                                 Visit Date: 2023  :     1955    Referring practitioner:    Moises Oliveira MD  Date of Initial Visit:          Type: THERAPY  Noted: 2023    Patient seen for 2 sessions    Visit Diagnoses:    ICD-10-CM ICD-9-CM   1. Functional neurological symptom disorder with weakness or paralysis  F44.4 300.11   2. Lower paraplegia  G82.20 344.1   3. Decreased activities of daily living (ADL)  Z78.9 V49.89     SUBJECTIVE     Subjective:  Pt reports fall last week outside of the clinic due to her wheelchair tipping off the sidewalk. Pt   transported To the ED and seen by MD since fall. Pt reports dizziness since fall the week before.   Pt reports she has visited the doctor for this. Pt reports decreased ROM and function in L hand on   Ulnar side d/t being off arthritis medications.     PAIN: 4/10 R side from recent fall          OBJECTIVE     Objective     Therapeutic Exercises    91457 Units Comments   1-2 lb free weight shoulder flexion, shoulder press, shoulder internal and external rotation, shoulder abduction, elbow flexion, elbow extension, forearm pronation and supination, wrist flexion and extension 10-20 reps to fatigue x1   2 lb L side 20 reps, 1 lb R side 10 reps   Education provided on fall prevention and safety with w/c use.                     Timed Minutes 40     Total Timed Treatment:     40  mins  Total Time of Visit:             40   mins         Therapy Education/Self Care 84194   Education offered today BUE strengthening HEP    [employer] Code    Ongoing HEP   BUE strengthening program    Timed Minutes            ASSESSMENT/PLAN     GOALS  Goals                                          Progress Note due by 23                                                      Recert due by  11/14/23   STG by: 9/16/23 Comments Date Status   Pt will be independent with daily completion of a HEP to address B UE and core strength deficits.     BUE strengthening program added to HEP.     Ongoing   Pt will complete all aspects of LB dressing and toileting with Mod I.       Ongoing   Pt will increase B UE strength to 5/5 at all joints to increase ADL and IADL independence.   Tolerated more weight and reps on the L side. R side limited by recent fall.     Ongoing             LTG by: 9/16/23         Pt will complete all functional ADL transfers with Mod I and good safety.       Ongoing   Pt will complete simulated household IADL tasks in sitting or standing when possible displaying Mod I and good safety.       Ongoing   Pt will increase B hand  strength to 30# for improved performance with ADL and IADL tasks.         Ongoing                                     Assessment/Plan     ASSESSMENT:   Pt demonstrated understanding of BUE strengthening HEP. Pt c/o pain and soreness  In R elbow and shoulder and R side from fall. Pt demonstrated impaired R UE ROM in    Shoulder and elbow D/t pain and soreness. Pt reported concerns about L hand ROM   And function on ulnar side d/t arthritis. Will address functional ADL transfers and functional   L hand use in future sessions.     PLAN:   Will focus on ADL transfer training and hand strengthening/ROM exercises.     SIGNATURE: Denise Flynn OT Student  Electronically Signed on 8/29/2023    The clinical instructor and/or supervising staff, KATHYA Engle/L, was present in clinic guiding the visit by approving, concurring, and confirming the skilled judgement for all services rendered.    Signature:  KATHYA Engle/L, [place] License #: [id number]    Electronically signed on 8/29/2023        [address] [place] [address] [place] [address]  [phone]

## 2023-08-31 ENCOUNTER — TREATMENT (OUTPATIENT)
Dept: PHYSICAL THERAPY | Facility: CLINIC | Age: 68
End: 2023-08-31
Payer: MEDICARE

## 2023-08-31 DIAGNOSIS — G82.20 LOWER PARAPLEGIA: ICD-10-CM

## 2023-08-31 DIAGNOSIS — Z78.9 DECREASED ACTIVITIES OF DAILY LIVING (ADL): ICD-10-CM

## 2023-08-31 DIAGNOSIS — F44.4 FUNCTIONAL NEUROLOGICAL SYMPTOM DISORDER WITH WEAKNESS OR PARALYSIS: Primary | ICD-10-CM

## 2023-08-31 NOTE — PROGRESS NOTES
Occupational Therapy Treatment Note  [address] [place] [address]    Patient: Tawny Shin                                                 Visit Date: 2023  :     1955    Referring practitioner:    Moises Oliveira MD  Date of Initial Visit:          Type: THERAPY  Noted: 2023    Patient seen for 3 sessions    Visit Diagnoses:    ICD-10-CM ICD-9-CM   1. Functional neurological symptom disorder with weakness or paralysis  F44.4 300.11   2. Lower paraplegia  G82.20 344.1   3. Decreased activities of daily living (ADL)  Z78.9 V49.89     SUBJECTIVE     Subjective:  Pt reports she has been feeling better and less sore from her recent fall. Pt is continuing to complete her  HEP while being mindful of the pain on her R side.     PAIN: 6/10 R side from recent fall.        OBJECTIVE     Objective     Therapeutic Exercises    01785 Units Comments   Pt completed B UE AROM exercise using 2# free weight performing 1 set of 10 reps for shoulder flexion, shoulder internal/external rotation, elbow flexion/extension, wrist flexion/extension, shoulder abduction, and forearm pronation/supination.      Pt completed B UE AROM exercise using 2# free weight for tricep extension performing 1 set of 10 reps.     Pt completed B hand power grasp using 1.5# digi-flex performing 1 set of 20 reps each.     Pt completed B hand tip, lateral, and 3 point pinch strengthening using light resistance gel egg performing 1 set of 10 reps each.           Timed Minutes 25     Therapeutic Activities    46641 Comments   Pt completed B UE FMC task using grooved pegboard with mod difficulty and mod dropping. Pt manipulated pieces better with B hands with more repetitions.     Pt completed B in-hand manipulation activity using pennies, dimes, quarters, and nickels. Pt focused on picking them up and manipulating from finger<>palm before placing into slender container  with min dropping.     Pt completed B hand 3 point pinch strengthening task using cotton balls and light resistance clip. Pt was able to  10 cotton balls and placing them into the container with each hand. Pt repeated activity 2 more times when upgrading to red moderate and then green moderate resistance clips with min difficulty.            Timed Minutes 20      Total Timed Treatment:     45  mins  Total Time of Visit:             45   mins         Therapy Education/Self Care 86774   Education offered today Reviewed BUE strengthening HEP and encouraged her to continue while respecting her pain.   [employer] Code    Ongoing HEP   BUE strengthening program    Timed Minutes            ASSESSMENT/PLAN     GOALS  Goals                                          Progress Note due by 9/16/23                                                      Recert due by 11/14/23   STG by: 9/16/23 Comments Date Status   Pt will be independent with daily completion of a HEP to address B UE and core strength deficits.     BUE strengthening program added to HEP.     Ongoing   Pt will complete all aspects of LB dressing and toileting with Mod I.       Ongoing   Pt will increase B UE strength to 5/5 at all joints to increase ADL and IADL independence.   Good endurance noted during strengthening and less soreness reported at R side.    Ongoing             LTG by: 9/16/23         Pt will complete all functional ADL transfers with Mod I and good safety.   Tricep extension completed to help with transfers.    Ongoing   Pt will complete simulated household IADL tasks in sitting or standing when possible displaying Mod I and good safety.       Ongoing   Pt will increase B hand  strength to 30# for improved performance with ADL and IADL tasks.     Pt tolerated moderate  and pinch strengthening today.    Ongoing                                     Assessment/Plan     ASSESSMENT:   Pt completed strengthening using R UE with less pain  reported d/t recent fall.  Pt reports she is gradually starting to feel less soreness. Focused on strengthening  to improve functional transfers. Focused on distal strengthening and hand/pinch  strength today and pt tolerated well.     PLAN:   Will focus on ADL transfer training and hand strengthening/ROM exercises.     Signature:  Kasie Moe OTR/L, [place] License #: [id number]    Electronically signed on 8/31/2023        [address] [place] [address]  [place]. [address]  [phone]

## 2023-08-31 NOTE — PROGRESS NOTES
Physical Therapy Treatment Note, 30 Day Progress Note, and 90 Day Recertification Note  [address] [place] [address]    Patient: Tawny Shin                                                 Visit Date: 2023  :     1955    Referring practitioner:    Moises Oliveira MD  Date of Initial Visit:          Type: THERAPY  Noted: 2023    Patient seen for 19 sessions    Visit Diagnoses:    ICD-10-CM ICD-9-CM   1. Functional neurological symptom disorder with weakness or paralysis  F44.4 300.11   2. Lower paraplegia  G82.20 344.1     SUBJECTIVE     Subjective: She has pain under her R breast and flank. She is still having dizzy spells when she sits up and when she lays down. She describes it as the room spinning. She feels ~40% improved because transfers and standing w/ the FWW is easier.     PAIN: /10 R > L arm         OBJECTIVE     Objective       LE MMT   HIP Strength L Strength R   flexion 5 5    extension      ABD 4-  3-   KNEE     flexion 5  5   extension 5  4-        ANKLE     dorsiflexion      plantarflexion      eversion      inversion  2+      Therapeutic Activities    52134 Comments   Stand pivot transfers with Rwx WC <> mat table CGA    131/72 sup > 103/66 sitting EOB    *R upbeating torsional nystagmus noted w/ rolling to L* Suspect L posterior canal BPPV, address next session   Timed Minutes 10        Therapeutic Exercises    62154 Units Comments   See objective, goals     Timed Minutes 35      Therapy Education/Self Care 84838   Education offered today    [facility] Code [id number]           Ongoing HEP   Date: 2023  Prepared by: Maria R Garay     Exercises  - Prone Knee Extension with Ankle Weight  - 2-3 x daily - 7 x weekly - 30-60 seconds hold  - Seated Hamstring Stretch with Chair  - 2-3 x daily - 7 x weekly - 30-60 seconds hold  - Supine Bridge  - 2 x daily - 7 x weekly - 2 sets - 10 reps  - Supine   "x daily - 7 x weekly - 2 sets - 10 reps  - Supine Active Straight Leg Raise  - 1-2 x daily - 7 x weekly - 2 sets - 10 reps   Timed Minutes        Total Timed Treatment:     45   mins  Total Time of Visit:             45   mins         ASSESSMENT/PLAN     GOALS  Goals                                            Progress Note due by 9/1/2023                                                                Recert due by 9/8/2023   LTG by: 12 weeks Comments Date Status   Patient will report compliance with HEP.  Performing ~3x-4x/week 8/31 ongoing   Pt will be able to maintain static standing w/ 50/50 Wbing for >/=3 mins w/o UE support/a to improve capacity for ADL/IADLs 60# RLE, 150# LLE for ~2 mins 8/31 ongoing   Patient to perform TUG in </=2 mins w/ FWW without LOB for improved capacity for household ambulation. Able to ambulate 8'3\" in </=2 mins 8/31 ongoing   Pt will be able to perform 5x STS w/ 50/50 Wbing in </=2 mins for improved functional strength 11.3 secs but asymmetrical 8/31 ongoing   Improve R ankle DF to neutral 3 deg from neutral PROM 8/31 ongoing    Improve gross B LE strength to >/=4/5 See MMT grid 8/31  ongoing       Assessment/Plan     ASSESSMENT: Progress note performed per POC. She has not yet met any goals to date however pt demo's good progression towards functional goals at this time as evidenced by longest ambulatory distance to date w/ FWW and less physical assist, improved strength w/ functional and formal testing, ability to perform stand pivot tx w/ FWW and CGA, and improved standing activity tolerance. Her dizziness appears to be a combination of orthostatic hypotension and potential BPPV. She continues to benefit from skilled OP PT services to optimize safety and independence w/ functional mobility.    PLAN: Evaluate for presence of BPPV next session. Continue functional strengthening, balance re-ed, NMR, stretching, dynamic standing activities per pt tolerance.    SIGNATURE: Molly " Maria R Garrett, PT, [place] License #: [id number]  Electronically Signed on 8/31/2023        [address] [facility] [place] [address]  [phone]

## 2023-09-05 ENCOUNTER — TREATMENT (OUTPATIENT)
Dept: PHYSICAL THERAPY | Facility: CLINIC | Age: 68
End: 2023-09-05
Payer: MEDICARE

## 2023-09-05 DIAGNOSIS — F44.4 FUNCTIONAL NEUROLOGICAL SYMPTOM DISORDER WITH WEAKNESS OR PARALYSIS: Primary | ICD-10-CM

## 2023-09-05 DIAGNOSIS — G82.20 LOWER PARAPLEGIA: ICD-10-CM

## 2023-09-05 NOTE — PROGRESS NOTES
Physical Therapy Treatment Note  [address] [place] [address]    Patient: Tawny Shin                                                 Visit Date: 2023  :     1955    Referring practitioner:    Moises Oliveira MD  Date of Initial Visit:          Type: THERAPY  Noted: 2023    Patient seen for 20 sessions    Visit Diagnoses:    ICD-10-CM ICD-9-CM   1. Functional neurological symptom disorder with weakness or paralysis  F44.4 300.11   2. Lower paraplegia  G82.20 344.1     SUBJECTIVE     Subjective:She reports she is still having some dizziness but not as bad. She has pain under her R breast and flank       PAIN: 5/10         OBJECTIVE     Objective       Canalith Repositioning     [id number]   [place] Side Outcome   [place] Hallpike B  R negative, L was + downbeat x 3 sec              Time 12      Therapeutic Activities    73496 Comments   Stand pivot transfers with Rwx x 2 shuttle press<>WC<>mat table                             Timed Minutes 10     Therapeutic Exercises    23659 Units Comments   Walk 7 ft with Rwx CGA x2     B LE Shuttle Press 5 bands x 5 min     B unilateral  LE Shuttle Press 3 bands x 5 min each               Timed Minutes 25      Therapy Education/Self Care 35804   Education offered today    [facility] [id number]            Ongoing HEP     [id number]                 Timed Minutes        Total Timed Treatment:     35   mins  Total Time of Visit:             47   mins         ASSESSMENT/PLAN     GOALS          Goals                                            Progress Note due by 2023                                                                Recert due by 2023   LTG by: 12 weeks Comments Date Status   Patient will report compliance with HEP.  Performing ~3x-4x/week  ongoing   Pt will be able to maintain static standing w/ 50/50 Wbing for >/=3 mins w/o UE support/a to improve capacity for ADL/IADLs  "60# RLE, 150# LLE for ~2 mins 8/31 ongoing   Patient to perform TUG in </=2 mins w/ FWW without LOB for improved capacity for household ambulation. Able to ambulate 8'3\" in </=2 mins 8/31 ongoing   Pt will be able to perform 5x STS w/ 50/50 Wbing in </=2 mins for improved functional strength 11.3 secs but asymmetrical 8/31 ongoing   Improve R ankle DF to neutral 3 deg from neutral PROM 8/31 ongoing    Improve gross B LE strength to >/=4/5 See MMT grid 8/31  ongoing       Assessment/Plan     ASSESSMENT:   We addressed the recent problematic dizziness today with the [place] Hallpike.She was positive on the L only and hopefully today's intervention will resolve the issue although it would be wise to test her for orthostatic hypotension again as well due to her history.    PLAN:   Assess her long term response to today's session and progress accordingly.    SIGNATURE: Kentrell Lamb PTA [place] License #: [id number]  Electronically Signed on 9/5/2023        [facility] [place] [address]  [phone]   "

## 2023-09-07 ENCOUNTER — TREATMENT (OUTPATIENT)
Dept: PHYSICAL THERAPY | Facility: CLINIC | Age: 68
End: 2023-09-07
Payer: MEDICARE

## 2023-09-07 DIAGNOSIS — F44.4 FUNCTIONAL NEUROLOGICAL SYMPTOM DISORDER WITH WEAKNESS OR PARALYSIS: Primary | ICD-10-CM

## 2023-09-07 DIAGNOSIS — G82.20 LOWER PARAPLEGIA: ICD-10-CM

## 2023-09-07 PROCEDURE — 97110 THERAPEUTIC EXERCISES: CPT | Performed by: PHYSICAL THERAPIST

## 2023-09-07 NOTE — PROGRESS NOTES
Physical Therapy Treatment Note  [address] [place] [address]    Patient: Tawny Shin                                                 Visit Date: 2023  :     1955    Referring practitioner:    Moises Oliveira MD  Date of Initial Visit:          Type: THERAPY  Noted: 2023    Patient seen for 21 sessions    Visit Diagnoses:    ICD-10-CM ICD-9-CM   1. Functional neurological symptom disorder with weakness or paralysis  F44.4 300.11   2. Lower paraplegia  G82.20 344.1     SUBJECTIVE     Subjective:She reports her shoulder is still hurting but better. She also reports the dizziness is gone.       PAIN: 3/10         OBJECTIVE     Objective     Therapeutic Exercises    15044 Units Comments   Walking for max with Rwx followed with WC x 2 reps and 12 ft  19 ft   W/o UE and LE support OH, cindy, kriss cross, and granny throws with #4                    Timed Minutes 45        Therapy Education/Self Care 58529   Education offered today    [facility] Code [id number]      Ongoing HEP   Date: 2023  Prepared by: Maria R Garay     Exercises  - Prone Knee Extension with Ankle Weight  - 2-3 x daily - 7 x weekly - 30-60 seconds hold  - Seated Hamstring Stretch with Chair  - 2-3 x daily - 7 x weekly - 30-60 seconds hold  - Supine Bridge  - 2 x daily - 7 x weekly - 2 sets - 10 reps  - Supine March  - 1-2 x daily - 7 x weekly - 2 sets - 10 reps  - Supine Active Straight Leg Raise  - 1-2 x daily - 7 x weekly - 2 sets - 10 reps   Timed Minutes        Total Timed Treatment:     45   mins  Total Time of Visit:             45   mins         ASSESSMENT/PLAN     GOALS  Goals                                            Progress Note due by 2023                                                                Recert due by 2023   LTG by: 12 weeks Comments Date Status   Patient will report compliance with HEP.  Performing ~3x-4x/week   "ongoing   Pt will be able to maintain static standing w/ 50/50 Wbing for >/=3 mins w/o UE support/a to improve capacity for ADL/IADLs 60# RLE, 150# LLE for ~2 mins 8/31 ongoing   Patient to perform TUG in </=2 mins w/ FWW without LOB for improved capacity for household ambulation. Able to ambulate 8'3\" in </=2 mins 8/31 ongoing   Pt will be able to perform 5x STS w/ 50/50 Wbing in </=2 mins for improved functional strength 11.3 secs but asymmetrical 8/31 ongoing   Improve R ankle DF to neutral 3 deg from neutral PROM 8/31 ongoing    Improve gross B LE strength to >/=4/5 See MMT grid 8/31  ongoing       Assessment/Plan     ASSESSMENT:   She walked the greatest distance she has walked to date today.     PLAN:   Continue to progress as symptoms allow.    SIGNATURE: Kentrell Lamb PTA, [place] License #: [id number]  Electronically Signed on 9/7/2023        [facility] [address]  [place] [address]  [phone]   "

## 2023-09-12 ENCOUNTER — TREATMENT (OUTPATIENT)
Dept: PHYSICAL THERAPY | Facility: CLINIC | Age: 68
End: 2023-09-12
Payer: MEDICARE

## 2023-09-12 DIAGNOSIS — F44.4 FUNCTIONAL NEUROLOGICAL SYMPTOM DISORDER WITH WEAKNESS OR PARALYSIS: Primary | ICD-10-CM

## 2023-09-12 DIAGNOSIS — Z78.9 DECREASED ACTIVITIES OF DAILY LIVING (ADL): ICD-10-CM

## 2023-09-12 DIAGNOSIS — G82.20 LOWER PARAPLEGIA: ICD-10-CM

## 2023-09-12 NOTE — PROGRESS NOTES
"                                                                Occupational Therapy Treatment Note  [address] [place] [address]    Patient: Tawny Shin                                                 Visit Date: 2023  :     1955    Referring practitioner:    Moises Oliveira MD  Date of Initial Visit:          Type: THERAPY  Noted: 2023    Patient seen for 4 sessions    Visit Diagnoses:    ICD-10-CM ICD-9-CM   1. Functional neurological symptom disorder with weakness or paralysis  F44.4 300.11   2. Decreased activities of daily living (ADL)  Z78.9 V49.89     SUBJECTIVE     Subjective:  Pt reports she has been completing her BUE strengthening program at home. Pt stated that PT   \"Worked me hard today, making me do things I have never done with my legs.\"     PAIN: 0/10 beginning of session in B arms. Pain in B LE at end of session 3/10, B arms at end of   Session tired but no pain. B hands 3/10 at end of session.        OBJECTIVE     Objective     Therapeutic Exercises    73243 Units Comments   Pt completed B UE AROM exercise using 2# free weight performing 1 set of 10 reps for shoulder flexion, shoulder internal/external rotation, elbow flexion/extension, and shoulder abduction.      Pt completed B UE AROM exercise using 2# free weight for tricep extension performing 1 set of 10 reps.          Timed Minutes 5     Self-Care/IADL Training    07198 Comments   Performed functional transfers for ADL/IADL participation; mat > w/c Jose R, w/c<>stand Jose R. Tolerated standing for approx. 1 min intervals for IADL tasks with CGA and use of walker/counter for stabilization.  Pt would like to practice w/c <> toilet and bed<> BSC transfers for increased independence with ADL performance.    Pt unloaded both racks of the  using her R UE independently, while sitting in her w/c, placing dishes on the counter. Pt placed the dishes on the 1st and 2nd shelf of the upper cabinet using her R UE " while standing at the counter with CGA. When in standing pt does not distribute weight between her R and L LE evenly, most weight is placed through her L LE. Pt reports she feels steady in standing for short periods of time.     Pt loaded and unloaded clothes in the washing machine from sitting position. Pt used a reacher to unload the clothes. Pt loaded clothes in the dryer from sitting position. Pt unloaded the clothes from the dryer in standing with CGA requiring her to reach over head using her R UE. Pt folded all the items of clothing sitting in her power w/c, without using the table for a stabilizer.             Timed Minutes 30       Therapeutic Activities    62356 Comments   Participate in BITS activities focused on R UE AROM. Accuracy ranged from 95 to 96.77%. Reaction time was 1.53 to 3.15 seconds. Completed while sitting in power w/c.  Completed visual scanning activities (user paced, sequence numbers, verbal numbers).                   Timed Minutes 10       Total Timed Treatment:     45  mins  Total Time of Visit:             45   mins         Therapy Education/Self Care 85066   Education offered today Reviewed BUE strengthening HEP. Discussed increasing participation in IADL tasks at home with the assistance and supervision of her caregivers for safety.    [facility] Code    Ongoing HEP   BUE strengthening program    Timed Minutes            ASSESSMENT/PLAN     GOALS  Goals                                          Progress Note due by 9/16/23                                                      Recert due by 11/14/23   STG by: 9/16/23 Comments Date Status   Pt will be independent with daily completion of a HEP to address B UE and core strength deficits.    Reviewed with no concerns.     Ongoing   Pt will complete all aspects of LB dressing and toileting with Mod I.       Ongoing   Pt will increase B UE strength to 5/5 at all joints to increase ADL and IADL independence.  Good UE endurance noted with  overhead reaching and IADL simulation.     Ongoing            LTG by: 9/16/23        Pt will complete all functional ADL transfers with Mod I and good safety.  Completed mat > w/c and w/c<>stand with min A for IADL participation.     Ongoing   Pt will complete simulated household IADL tasks in sitting or standing when possible displaying Mod I and good safety.  Completed IADL task of unloading and putting away dishes, and laundry tasks independently when seated and with CGA when standing.     Ongoing   Pt will increase B hand  strength to 30# for improved performance with ADL and IADL tasks.    Unloaded cups from  and placed in upper cabinets with no dropping.     Ongoing                                     Assessment/Plan     ASSESSMENT:   Pt demonstrated improvements in functional transfers for IADL participation. Pt did not   Demonstrate any UE strength deficits which impeded function today. Pt demonstrated   Improvements in IADL performance. Pt was encouraged to begin participating in IADL   tasks with the supervision and assistance of her caregivers at home for safety. Pt also   Demonstrated significant improvements in her R UE AROM throughout the session.     PLAN:   Will focus on ADL transfers, specifically bed <> BSC and w/c <> toilet transfers.     Signature:  Denise Flynn OT Student    Electronically signed on 9/12/2023    The clinical instructor and/or supervising staff, KATHYA Engle/L, was present in clinic guiding the visit by approving, concurring, and confirming the skilled judgement for all services rendered.    Signature:  KATHYA Engle/L, [place] License #: [id number]    Electronically signed on 9/12/2023          [address] [place] [address]. [address]  [phone]

## 2023-09-12 NOTE — PROGRESS NOTES
Physical Therapy Treatment Note  [address] [place] [address]    Patient: Tawny Shin                                                 Visit Date: 2023  :     1955    Referring practitioner:    Moises Oliveira MD  Date of Initial Visit:          Type: THERAPY  Noted: 2023    Patient seen for 22 sessions    Visit Diagnoses:    ICD-10-CM ICD-9-CM   1. Functional neurological symptom disorder with weakness or paralysis  F44.4 300.11   2. Lower paraplegia  G82.20 344.1   3. Decreased activities of daily living (ADL)  Z78.9 V49.89     SUBJECTIVE     Subjective: Nothing new to report.      PAIN: 0/10         OBJECTIVE     Objective     Therapeutic Exercises    79001 Units Comments   Iso ABD DL bridge 3 sec 2x10    STS DL *5 No UE support/a, from elevated mat table   Mod SL squat *5 No UE support/a, from elevated mat table   Mod sit up from navy and bright blue wedge against resistance 10 4# MB   Elbow touch side/side against resistance 10 4# MB   Timed Minutes 40        Therapy Education/Self Care 81410   Education offered today    [id number]      Ongoing HEP   Date: 2023  Prepared by: Maria R Garay     Exercises  - Prone Knee Extension with Ankle Weight  - 2-3 x daily - 7 x weekly - 30-60 seconds hold  - Seated Hamstring Stretch with Chair  - 2-3 x daily - 7 x weekly - 30-60 seconds hold  - Supine Bridge  - 2 x daily - 7 x weekly - 2 sets - 10 reps  - Supine March  - 1-2 x daily - 7 x weekly - 2 sets - 10 reps  - Supine Active Straight Leg Raise  - 1-2 x daily - 7 x weekly - 2 sets - 10 reps   Timed Minutes        Total Timed Treatment:     40   mins  Total Time of Visit:             40   mins         ASSESSMENT/PLAN     GOALS  Goals                                            Progress Note due by 2023                                                                Recert due by 2023   LTG by: 12  "weeks Comments Date Status   Patient will report compliance with HEP.  Performing ~3x-4x/week 8/31 ongoing   Pt will be able to maintain static standing w/ 50/50 Wbing for >/=3 mins w/o UE support/a to improve capacity for ADL/IADLs 60# RLE, 150# LLE for ~2 mins 8/31 ongoing   Patient to perform TUG in </=2 mins w/ FWW without LOB for improved capacity for household ambulation. Able to ambulate 8'3\" in </=2 mins 8/31 ongoing   Pt will be able to perform 5x STS w/ 50/50 Wbing in </=2 mins for improved functional strength 11.3 secs but asymmetrical 8/31 ongoing   Improve R ankle DF to neutral 3 deg from neutral PROM 8/31 ongoing    Improve gross B LE strength to >/=4/5 See MMT grid 8/31  ongoing       Assessment/Plan     ASSESSMENT: She was able to perform few reps of STS w/o UE assist from elevated mat table but demo'd LLE > RLE fatigue after ~3 reps. She perform stand pivot tx using FWW w/ spv today w/ no evidence of LOB.    PLAN:Continue to progress as symptoms allow.    SIGNATURE: Molly Garrett, PT, [place] License #: [id number]  Electronically Signed on 9/12/2023        [place] [address]  [phone]   "

## 2023-09-14 ENCOUNTER — TREATMENT (OUTPATIENT)
Dept: PHYSICAL THERAPY | Facility: CLINIC | Age: 68
End: 2023-09-14
Payer: MEDICARE

## 2023-09-14 DIAGNOSIS — F44.4 FUNCTIONAL NEUROLOGICAL SYMPTOM DISORDER WITH WEAKNESS OR PARALYSIS: Primary | ICD-10-CM

## 2023-09-14 DIAGNOSIS — Z78.9 DECREASED ACTIVITIES OF DAILY LIVING (ADL): ICD-10-CM

## 2023-09-14 DIAGNOSIS — G82.20 LOWER PARAPLEGIA: ICD-10-CM

## 2023-09-14 PROCEDURE — 97110 THERAPEUTIC EXERCISES: CPT | Performed by: PHYSICAL THERAPIST

## 2023-09-14 NOTE — PROGRESS NOTES
Occupational Therapy 30 day Progress Note   [address] [place] [address]    Patient: Tawny Shin   : 1955  Referring practitioner: Moises Oliveira MD  Date of Initial Visit: 2023  Today's Date: 2023  Patient seen for 5 sessions    Visit Diagnoses:    ICD-10-CM ICD-9-CM   1. Functional neurological symptom disorder with weakness or paralysis  F44.4 300.11   2. Decreased activities of daily living (ADL)  Z78.9 V49.89       SUBJECTIVE     SUBJECTIVE:   Pt reports no pain today. Pt reports she was able to transfer and complete toileting independently   for the first time today. Pt does admit her caregivers were unaware of her transfering and toileting   alone. Pt reports that she believes things are getting better.      PAIN:   Pt reports no 0/10 pain today.     Outcome Measure:   9 Hole Peg Test: Left: 37.97 Right: 33.53 s    Modified Barthel Index: 52/100, 48% impaired with self-care. Improved.     PT G-Codes  Outcome Measure Options: Quick DASH  Quick DASH Score: 45.45% impaired with UE use.     OBJECTIVE      Objective          Strength/Myotome Testing     Left Wrist/Hand      (2nd hand position)     Trial 1: 10 lbs    Trial 2: 11 lbs    Trial 3: 11 lbs    Average: 10.67 lbs    Right Wrist/Hand      (2nd hand position)     Trial 1: 15 lbs    Trial 2: 18 lbs    Trial 3: 15 lbs    Average: 16 lbs    Additional Strength Details  Recent fall resulting in limited use of the R UE for ~ 2 weeks may have affected  strength.      Ambulation     Comments   Has recently started walking short distances with PT with use of FWW. CGA for most ADL transfers with use of FWW.     Functional Assessment     Comments  ADL  Dressing: Min A UB. Mod A for LB. Improving.  Bathing: Set-up for UB and LB. Improving. CGA for transfer.   Toileting: CGA for transfer. Mod A for clothing. Mod I for hygiene.   Grooming: Mod I after set-up.    Feeding: Independent after set-up.    IADL  Caregivers are doing most IADL tasks for pt. She does assist with cooking and shopping at w/c level. She has began assisting with household cleaning tasks from the w/c level.        General Comments     Shoulder Comments   B UE AROM is WFL.   4+/5 strength at B UE. Improved.      Vision assessment: normal and wears corrected lenses/contact for reading.    Cognitive status: oriented to Person, Place, Time, and Situation    Sensation: Reports tingling in toes. No other deficits noted.    Tone: Grossly Normal at B UE.     Midline orientation: Midline    Fine Motor:   Unable to oppose L small finger. Reports hx of arthritis affecting the L small and ring fingers.     Wheelchair Training:  Independent with power w/c mobility.     Therapy Education/Self Care 50291   Education offered today Encouraged pt to continue daily performance of HEP.    [facility] Code    Ongoing HEP   W/C push ups and modified w/c sit ups for core strengthening and to improve transfers.  BUE strengthening program    Timed Minutes      Therapeutic Exercises    88325 Units Comments   All ADL, ROM, MMT,  strength, and outcome measures addressed for PN. See details above.           Timed Minutes 30       Self-Care/IADL Training    70911 Comments   Practiced functional ADL transfers. Completed w/c > mat table with FWW, Jose R and cues for safety. Completed mat table <> BSC with FWW use, CGA and min cues for hand placement and safety with walker.     Practiced donning and doffing pants for toileting with thera band to simulate pants. Pt required demonstration and VC to perform first attempt. During first attempt pt demonstrated a L lateral lean while completing task with R hand. Min cues required for pt to correct L lateral lean during second attempt. Both attempts performed with CGA and use of FWW.             Timed Minutes 15     Total Timed Treatment:     45  mins  Total Time of Visit:            45    mins    ASSESSMENT/PLAN     GOALS:  Goals                                          Progress Note due by 10/14/23                                                      Recert due by 11/14/23   STG by: 10/14/23 Comments Date Status   Pt will be independent with daily completion of a HEP to address B UE and core strength deficits.   Pt reports HEP performance 3-4/week.    Ongoing    Pt will complete all aspects of LB dressing and toileting with Mod I.  Still requiring modA from caregivers for LB dressing. Required Jose R for clothing management during toileting.     Ongoing    Pt will increase B UE strength to 5/5 at all joints to increase ADL and IADL independence.  B shoulders 4+/5, remaining UE joints 5/5   Progressing          LTG by: 10/14/23      Pt will complete all functional ADL transfers with Mod I and good safety.  CGA for functional ADL transfers using a FWW.   Progressing    Pt will complete simulated household IADL tasks in sitting or standing when possible displaying Mod I and good safety.  Pt CGA for standing IADL tasks, Denis for IADL tasks performed from her w/c. Pt requires some cues for safety during standing IADL tasks.   Progressing    Pt will increase B hand  strength to 30# for improved performance with ADL and IADL tasks.   Pt's  strength decreased from initial eval scores.   L hand 10#. R hand 16#.   Ongoing                            Assessment & Plan       Assessment  Impairments: abnormal coordination, abnormal or restricted ROM, activity intolerance, impaired balance, impaired physical strength, lacks appropriate home exercise program, pain with function, safety issue and weight-bearing intolerance   Functional limitations: carrying objects, lifting, walking, pulling, pushing, uncomfortable because of pain, moving in bed, standing, stooping and reaching overhead   Assessment details: This pt still has 24 hr caregivers to assist her with ADL/IADL performance. She has made improvements  in her functional ADL transfers using a FWW, now requiring CGA and cues for safe walker use. She has begun performing some IADL tasks in standing while at therapy and has walked short distances with PT. She demonstrates improvements in B UE strength, ROM and coordination. She continues to show good rehab potential to increase independence with daily tasks and decrease the need for caregivers with ADL. OT will continue to focus on these areas to allow her to reach her max level of function.   Prognosis: good    Plan  Planned therapy interventions: IADL retraining, therapeutic activities, ADL retraining, balance/weight-bearing training, body mechanics training, fine motor coordination training, functional ROM exercises, home exercise program, manual therapy, motor coordination training, neuromuscular re-education, strengthening, abdominal trunk stabilization, flexibility, stretching and transfer training  Frequency: 2x week  Duration in weeks: 12  Treatment plan discussed with: patient  Plan details: Will continue to address deficits in UE strength, coordination, balance and functional mobility to improve performance of ADL and IADL tasks.       Signature:  Denise Flynn OT Student     Electronically signed on 9/14/2023    The clinical instructor and/or supervising staff, TIFFANY Engle, was present in clinic guiding the visit by approving, concurring, and confirming the skilled judgement for all services rendered.    Signature:  KATHYA Engle/L, [place] License #: [id number]    Electronically signed on 9/14/2023          [place] [address]  [phone]

## 2023-09-14 NOTE — PROGRESS NOTES
Physical Therapy Treatment Note  [address] [place] [address]    Patient: Tawny Shin                                                 Visit Date: 2023  :     1955    Referring practitioner:    Moises Oliveira MD  Date of Initial Visit:          Type: THERAPY  Noted: 2023    Patient seen for 23 sessions    Visit Diagnoses:    ICD-10-CM ICD-9-CM   1. Functional neurological symptom disorder with weakness or paralysis  F44.4 300.11   2. Lower paraplegia  G82.20 344.1     SUBJECTIVE     Subjective:She reports feeling okay, no new issues the pain in her R UE is gone.       PAIN: 0/10         OBJECTIVE     Objective     Therapeutic Exercises    70348 Units Comments   B pec and thoracic stretches with 1/2 roller in sitting      B unilateral DF stretches with pink bolster     R TKE stretches with pink bolster      R heel slides/TKE with Egg x 20      L LE  SAQ with blue bolster with #3 x 20                Timed Minutes 41        Therapy Education/Self Care 15930   Education offered today    [facility] [id number]       Ongoing HEP   Date: 2023  Prepared by: Maria R Garay     Exercises  - Prone Knee Extension with Ankle Weight  - 2-3 x daily - 7 x weekly - 30-60 seconds hold  - Seated Hamstring Stretch with Chair  - 2-3 x daily - 7 x weekly - 30-60 seconds hold  - Supine Bridge  - 2 x daily - 7 x weekly - 2 sets - 10 reps  - Supine March  - 1-2 x daily - 7 x weekly - 2 sets - 10 reps  - Supine Active Straight Leg Raise  - 1-2 x daily - 7 x weekly - 2 sets - 10 reps   Timed Minutes        Total Timed Treatment:     41   mins  Total Time of Visit:             41   mins         ASSESSMENT/PLAN     GOALS  Goals                                            Progress Note due by 2023                                                                Recert due by 2023   LTG by: 12 weeks Comments Date Status   Patient will  "report compliance with HEP.  Performing ~3x-4x/week 8/31 ongoing   Pt will be able to maintain static standing w/ 50/50 Wbing for >/=3 mins w/o UE support/a to improve capacity for ADL/IADLs 60# RLE, 150# LLE for ~2 mins 8/31 ongoing   Patient to perform TUG in </=2 mins w/ FWW without LOB for improved capacity for household ambulation. Able to ambulate 8'3\" in </=2 mins 8/31 ongoing   Pt will be able to perform 5x STS w/ 50/50 Wbing in </=2 mins for improved functional strength 11.3 secs but asymmetrical 8/31 ongoing   Improve R ankle DF to neutral 3 deg from neutral PROM 8/31 ongoing    Improve gross B LE strength to >/=4/5 See MMT grid 8/31  ongoing       Assessment/Plan     ASSESSMENT:   Today I focused a little more on her lack of flexibility that effects her mobility.     PLAN:   Continue to progress as symptoms allow.     SIGNATURE: Kentrell Lamb PTA, [place] License #: [id number]  Electronically Signed on 9/14/2023        [facility] [place] [address]  [phone]   "

## 2023-09-19 ENCOUNTER — TREATMENT (OUTPATIENT)
Dept: PHYSICAL THERAPY | Facility: CLINIC | Age: 68
End: 2023-09-19
Payer: MEDICARE

## 2023-09-19 DIAGNOSIS — F44.4 FUNCTIONAL NEUROLOGICAL SYMPTOM DISORDER WITH WEAKNESS OR PARALYSIS: Primary | ICD-10-CM

## 2023-09-19 DIAGNOSIS — Z78.9 DECREASED ACTIVITIES OF DAILY LIVING (ADL): ICD-10-CM

## 2023-09-19 DIAGNOSIS — G82.20 LOWER PARAPLEGIA: ICD-10-CM

## 2023-09-19 NOTE — PROGRESS NOTES
"                                                                Physical Therapy Treatment Note  [address] [place] [address]    Patient: Tawny Shin                                                 Visit Date: 2023  :     1955    Referring practitioner:    Moises Oliveira MD  Date of Initial Visit:          Type: THERAPY  Noted: 2023    Patient seen for 24 sessions    Visit Diagnoses:    ICD-10-CM ICD-9-CM   1. Functional neurological symptom disorder with weakness or paralysis  F44.4 300.11   2. Lower paraplegia  G82.20 344.1     SUBJECTIVE     Subjective: She reports feeling okay, no new issues the pain in her R UE is gone. She had some dizziness return when she was laying down and turned to the right.      PAIN: 0/10         OBJECTIVE     Objective     Therapeutic Exercises    36669 Units Comments   Standing marches at FWW 10 B 2#   Standing HS curls at FWW 10 B 2#   BP assessment  124/83 mmHg sitting, 130/68 mmHg standing   2\" block step ups at FWW 8 7 w/ LLE leading, PT blocking R knee   Timed Minutes 40        Neuromuscular Reeducation     41090 Comments   L jose luis-halpike negative   R jose luis-halpike negative   Timed Minutes 5      Therapy Education/Self Care 69659   Education offered today    [facility] Code [email or web address]       Ongoing HEP   Date: 2023  Prepared by: Maria R Garay     Exercises  - Prone Knee Extension with Ankle Weight  - 2-3 x daily - 7 x weekly - 30-60 seconds hold  - Seated Hamstring Stretch with Chair  - 2-3 x daily - 7 x weekly - 30-60 seconds hold  - Supine Bridge  - 2 x daily - 7 x weekly - 2 sets - 10 reps  - Supine March  - 1-2 x daily - 7 x weekly - 2 sets - 10 reps  - Supine Active Straight Leg Raise  - 1-2 x daily - 7 x weekly - 2 sets - 10 reps   Timed Minutes        Total Timed Treatment:     45   mins  Total Time of Visit:             45   mins         ASSESSMENT/PLAN     GOALS  Goals                                            Progress Note due by " "9/30/2023                                                                Recert due by 11/29/2023   LTG by: 12 weeks Comments Date Status   Patient will report compliance with HEP.  Performing ~3x-4x/week 8/31 ongoing   Pt will be able to maintain static standing w/ 50/50 Wbing for >/=3 mins w/o UE support/a to improve capacity for ADL/IADLs 60# RLE, 150# LLE for ~2 mins 8/31 ongoing   Patient to perform TUG in </=2 mins w/ FWW without LOB for improved capacity for household ambulation. Able to ambulate 8'3\" in </=2 mins 8/31 ongoing   Pt will be able to perform 5x STS w/ 50/50 Wbing in </=2 mins for improved functional strength 11.3 secs but asymmetrical 8/31 ongoing   Improve R ankle DF to neutral 3 deg from neutral PROM 8/31 ongoing    Improve gross B LE strength to >/=4/5 See MMT grid 8/31  ongoing       Assessment/Plan     ASSESSMENT: She was able to negotiate 2\" steps x 8 reps at FWW w/ PT blocking R knee and VC/TC to remain upright to allow for optimal LE clearance. She continues to require physical assist at R knee d/t RLE > LLE weakness which will continue to be addressed in subsequent sessions for functional translation to gait training. She was negative for OH and BPPV B today though may need to address periodically. Her transfers are supervision stand pivot w/ FWW at this point indicating improvement in functional independence and safety.    PLAN: NMR, balance-red, functional strengthening, therapeutic exs, therapeutic act, canalith re-positioning PRN, BP monitoring PRN    SIGNATURE: Molly Garrett PT, [place] License #: [id number]  Electronically Signed on 9/19/2023        [place]. [address]  [phone]   "

## 2023-09-19 NOTE — PROGRESS NOTES
Occupational Therapy Treatment Note  [address] [place] [place] [address]    Patient: Tawny Shin                                                 Visit Date: 2023  :     1955    Referring practitioner:    Moises Oliveira MD  Date of Initial Visit:          Type: THERAPY  Noted: 2023    Patient seen for 6 sessions    Visit Diagnoses:    ICD-10-CM ICD-9-CM   1. Functional neurological symptom disorder with weakness or paralysis  F44.4 300.11   2. Lower paraplegia  G82.20 344.1   3. Decreased activities of daily living (ADL)  Z78.9 V49.89     SUBJECTIVE     Subjective:  Pt reports she has been completing her BUE strengthening program at home. Pt stated she had a visit   From her grandchildren this weekend and they played hard. Pt stated she has been doing more around  The house and was able to make breakfast with assistance from her grandchildren.     PAIN: 0/10 beginning of session in B arms.        OBJECTIVE     Objective     Therapeutic Exercises    38527 Units Comments   Pt completed B UE AROM exercise using 2# free weight performing 1 set of 15 reps for shoulder flexion, shoulder internal/external rotation, elbow flexion/extension, and shoulder abduction.      Pt completed B UE AROM exercise using 2# free weight for tricep extension performing 1 set of 15 reps.          Timed Minutes 15     Therapeutic Activities    20843 Comments   Remainder of BITS activities focused on L UE AROM and coordination. Accuracy ranged from 78.95 to 97.44%. Reaction time was 3.10 to 1.55 seconds. Pt attempted to complete BITS activities in standing but was unable to tolerate standing for longer than 30 seconds d/t B LE fatigue. BITS activities were completed in sitting on a jose disc for improved core stability.     Computerized Maze Assessment completed with the L UE in a time of 1:07 with 0 errors.   Visual Scanning and Motor Reaction Assessment  completed with the L UE. Level 1 31/50 required targets and reaction time was 1.88 seconds.       Completed sit<>stand x4 with FWW and CGA. Requiring no cues and demo'd safe hand placement and FWW use.             Timed Minutes 30       Total Timed Treatment:     45  mins  Total Time of Visit:             45   mins         Therapy Education/Self Care 04413   Education offered today Reviewed BUE strengthening HEP. Dicussed continued participation in IADL household tasks.    [facility] Code    Ongoing HEP   BUE strengthening program    Timed Minutes            ASSESSMENT/PLAN     GOALS:        Goals                                          Progress Note due by 10/14/23                                                      Recert due by 11/14/23   STG by: 10/14/23 Comments Date Status   Pt will be independent with daily completion of a HEP to address B UE and core strength deficits.    Pt reports she completed her HEP M-F last week.    Ongoing    Pt will complete all aspects of LB dressing and toileting with Mod I.     Ongoing    Pt will increase B UE strength to 5/5 at all joints to increase ADL and IADL independence.  Completed BUE strengthening with 2# free weight with no complaints of pain.    Progressing             LTG by: 10/14/23        Pt will complete all functional ADL transfers with Mod I and good safety.  Completed sit<>stand x4 with FWW with CGA. Required no cues for safety.    Progressing    Pt will complete simulated household IADL tasks in sitting or standing when possible displaying Mod I and good safety.  Completed L UE reaching in sitting with Denis and standing with CGA using FWW.    Progressing    Pt will increase B hand  strength to 30# for improved performance with ADL and IADL tasks.       Ongoing                                         Assessment/Plan     ASSESSMENT:   Pt demonstrated improvements in functional reach with L UE in sitting and standing.   Pt tolerated 15 reps of each BUE  strengthening exercises, which is a 5 rep improvement   From last week. Pt continues to make improvements toward independence in   ADL and IADL performance at home.     PLAN:   Will focus on B  strengthening and LB dressing for ADL performance.     Signature:  Denise Flynn, OT Student    Electronically signed on 9/19/2023    The clinical instructor and/or supervising staff, KATHYA Engle/L, was present in clinic guiding the visit by approving, concurring, and confirming the skilled judgement for all services rendered.    Signature:  KATHYA Engle/L, [place] License #: [id number]    Electronically signed on 9/19/2023            [address] [place] [address] [place] [address]  [phone]

## 2023-09-21 ENCOUNTER — TREATMENT (OUTPATIENT)
Dept: PHYSICAL THERAPY | Facility: CLINIC | Age: 68
End: 2023-09-21
Payer: MEDICARE

## 2023-09-21 DIAGNOSIS — G82.20 LOWER PARAPLEGIA: ICD-10-CM

## 2023-09-21 DIAGNOSIS — F44.4 FUNCTIONAL NEUROLOGICAL SYMPTOM DISORDER WITH WEAKNESS OR PARALYSIS: Primary | ICD-10-CM

## 2023-09-21 DIAGNOSIS — Z78.9 DECREASED ACTIVITIES OF DAILY LIVING (ADL): ICD-10-CM

## 2023-09-21 NOTE — PROGRESS NOTES
Occupational Therapy Treatment Note  [address] [place] [address]    Patient: Tawny Shin                                                 Visit Date: 2023  :     1955    Referring practitioner:    Moises Oliveira MD  Date of Initial Visit:          Type: THERAPY  Noted: 2023    Patient seen for 7 sessions    Visit Diagnoses:    ICD-10-CM ICD-9-CM   1. Functional neurological symptom disorder with weakness or paralysis  F44.4 300.11   2. Lower paraplegia  G82.20 344.1   3. Decreased activities of daily living (ADL)  Z78.9 V49.89     SUBJECTIVE     Subjective:  Pt reports she has been feeling a little sore in B UE after sessions on Tuesday. Pt reports she continues  To complete her HEP daily. Pt reports her caregiver sometimes doesn't allow her to complete certain tasks  In fear of falling, educated pt on the importance of her trying to complete tasks as independent as she can  With caregiver there for safety and when needing assistance.    PAIN: 1/10 in B arms. Pt reported very mild soreness.       OBJECTIVE     Objective     Therapeutic Exercises    28943 Units Comments   Pt completed B hand  strengthening using moderate resistance foam cylinder performing 1 set of 20 reps each.      Pt completed B hand isolated finger flexion using 1.5# digi-flex performing 1 set of 10 reps each digit.     Pt completed the arm bike on level 1.8 for 7 minutes and level 1.6 for 1 minute and 30 seconds. Pt kept good steady pace with no RB needed.     Timed Minutes 17     Therapeutic Activities    08229 Comments   Pt completed B hand 3 point pinch strengthening activity using min-max resistance clips. Pt was able to remove yellow light resistance, red light/moderate resistance, green moderate resistance, and blue moderate/heavy resistance and place them on vertical pole addressing functional reaching. Pt was able to remove black clips  using R hand only and unable to place on vertical pole. Pt was able to remove all clips and place them back onto vertical pole.                        Timed Minutes 8     Self-Care/IADL Training    12244 Comments   Pt completed LB dressing simulation using thera-band with mod difficulty. Pt was able to complete hip flexion and hip external rotation using thera-band for assistance. Pt completed 1 set of 10 reps each with min difficulty. Provided red thera-band with slightly more resistance to help with LB dressing simulations at home.   Educated pt on trying to complete ADL tasks when safe and able instead of caregiver doing it and not allowing her to attempt.                 Timed Minutes 20        Total Timed Treatment:     45  mins  Total Time of Visit:             45   mins         Therapy Education/Self Care 01357   Education offered today Reviewed BUE strengthening HEP. Dicussed continued participation in IADL household tasks.    [facility] Code    Ongoing HEP   BUE strengthening program    Timed Minutes            ASSESSMENT/PLAN     GOALS:        Goals                                          Progress Note due by 10/14/23                                                      Recert due by 11/14/23   STG by: 10/14/23 Comments Date Status   Pt will be independent with daily completion of a HEP to address B UE and core strength deficits.    Pt continues to complete her HEP daily.   Ongoing    Pt will complete all aspects of LB dressing and toileting with Mod I.  Focused on hip flexion and external rotation using thera band for help with mod difficulty.   Ongoing    Pt will increase B UE strength to 5/5 at all joints to increase ADL and IADL independence.  Pt tolerated 8:30 minutes on the arm bike on level 1.8 and 1.6 with good pace and no RB needed.   Progressing             LTG by: 10/14/23        Pt will complete all functional ADL transfers with Mod I and good safety.     Progressing    Pt will complete  simulated household IADL tasks in sitting or standing when possible displaying Mod I and good safety.     Progressing    Pt will increase B hand  strength to 30# for improved performance with ADL and IADL tasks.    Focused more on distal strengthening today.   Ongoing                                         Assessment/Plan     ASSESSMENT:   Pt completed more B hand strengthening today while also addressing B hand pinch  and functional reaching. Pt was able to complete the black heavy clips during session.  Pt tolerated the arm bike for 8:30 minutes on level 1.8 and 1.6 to help workout  some of her B UE soreness.     PLAN:   Will focus on B  strengthening and LB dressing for ADL performance.       Signature:  KATHYA Padilla/L, [place] License #: [id number]    Electronically signed on 9/21/2023            [address]  [place]. [address]  [phone]

## 2023-09-21 NOTE — PROGRESS NOTES
Physical Therapy Treatment Note  [address] [place] [address]    Patient: Tawny Shin                                                 Visit Date: 2023  :     1955    Referring practitioner:    Moises Oliveira MD  Date of Initial Visit:          Type: THERAPY  Noted: 2023    Patient seen for 25 sessions    Visit Diagnoses:    ICD-10-CM ICD-9-CM   1. Functional neurological symptom disorder with weakness or paralysis  F44.4 300.11   2. Lower paraplegia  G82.20 344.1     SUBJECTIVE     Subjective:Yesterday she was fine but she woke up this morning and her arms were sore, denies any dizziness since the last session.      PAIN: 1/10         OBJECTIVE     Objective     Therapeutic Exercises    15077 Units Comments   Walk 7 ft with Rwx CGA x1 x 2 reps       B LE Shuttle Press 7 bands x 5 min       B unilateral  LE Shuttle Press 4 bands x 5 min each        B pec and thoracic stretches in sitting with foam roller         stand pivot WC<>mat table       Timed Minutes 38       Therapy Education/Self Care 63733   Education offered today    [facility] Code [email or web address]    Ongoing HEP   Date: 2023  Prepared by: Maria R Garay     Exercises  - Prone Knee Extension with Ankle Weight  - 2-3 x daily - 7 x weekly - 30-60 seconds hold  - Seated Hamstring Stretch with Chair  - 2-3 x daily - 7 x weekly - 30-60 seconds hold  - Supine Bridge  - 2 x daily - 7 x weekly - 2 sets - 10 reps  - Supine March  - 1-2 x daily - 7 x weekly - 2 sets - 10 reps  - Supine Active Straight Leg Raise  - 1-2 x daily - 7 x weekly - 2 sets - 10 reps   Timed Minutes        Total Timed Treatment:     38   mins  Total Time of Visit:             38   mins         ASSESSMENT/PLAN     GOALS  Goals                                            Progress Note due by 2023                                                                Recert due by 2023   LTG by: 12  weeks Comments Date Status   Patient will report compliance with HEP.  Performing ~3x-4x/week 8/31 ongoing   Pt will be able to maintain static standing w/ 50/50 Wbing for >/=3 mins w/o UE support/a to improve capacity for ADL/IADLs 60# RLE, 150# LLE for ~2 mins 8/31 ongoing   Patient to perform TUG in </=2 mins w/ FWW without LOB for improved capacity for household ambulation. Able to ambulate 7 ft x 2 today 9/21 ongoing   Pt will be able to perform 5x STS w/ 50/50 Wbing in </=2 mins for improved functional strength 11.3 secs but asymmetrical 8/31 ongoing   Improve R ankle DF to neutral 3 deg from neutral PROM 8/31 ongoing    Improve gross B LE strength to >/=4/5 See MMT grid 8/31  ongoing       Assessment/Plan     ASSESSMENT:   She continues to slowly improve her overall endurance and she hasn't had any more issues with dizziness with bed mobility.    PLAN:   Continue to inquire about the issues with dizziness as well as work to improve her endurance and overall strength.    SIGNATURE: Kentrell Lamb PTA, [place] License #: [id number]  Electronically Signed on 9/21/2023        [place] [address]  [phone]

## 2023-09-22 NOTE — PROGRESS NOTES
[facility] [place]ious Diseases Progress [place]    Patient:  Tawny Shin  YOB: 1955  MRN: [id number]   Primary Care Physician: Moises Oliveira MD  Referring Physician: Beni Urrutia, *     Chief Complaint:   Chief Complaint   Patient presents with    MSSA right hip abscess/possible osteomyelitis     Interval History/HPI: Here today for follow-up.  She had had previous methicillin susceptible Staph aureus involving the right hip with possible osteomyelitis involving the right hip/femur.  She has been on suppressive antibiotic treatment.  She is residing at home.  She indicates things are going well.  She is here with her sister today.  She has no side effects with her Keflex treatment.  Specifically no diarrhea or rash.  She is not having right hip pain or discomfort.  She feels the defect where she had the problem previously is filling in from below, but seems a little bit wider to her.  She is not requiring any wound care.  She notices this when she places her finger in the defect area.  She had no new problems.    Allergies:   Allergies   Allergen Reactions    Atorvastatin Other (See Comments)     LEG CRAMPS      Amoxicillin Rash    Escitalopram Rash    Nabumetone Rash    Niacin Er Rash    Penicillin G Rash    Penicillins Rash    Simvastatin Rash     Current Scheduled Medications:   Current Outpatient Medications on File Prior to Visit   Medication Sig    acetaminophen (TYLENOL) 325 MG tablet Take 2 tablets by mouth Every 6 (Six) Hours As Needed (mild pain). Indications: Fever, Pain    apixaban (ELIQUIS) 5 MG tablet tablet Take 1 tablet by mouth 2 (Two) Times a Day. Indications: Other - full anticoagulation    aspirin 81 MG EC tablet Take 1 tablet by mouth Daily. 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 Indications: Heart Attack    cephalexin (KEFLEX) 500 MG capsule Take 2  capsules by mouth 2 (Two) Times a Day. Indications: Infection of the Skin and/or Soft Tissue    Diclofenac Sodium (VOLTAREN) 1 % gel gel Apply 4 g topically to the appropriate area as directed 4 (Four) Times a Day As Needed. Indications: Joint Damage causing Pain and Loss of Function    DULoxetine (CYMBALTA) 60 MG capsule Take 1 capsule by mouth Daily. Indications: Major Depressive Disorder    famotidine (PEPCID) 40 MG tablet Take 1 tablet by mouth Daily. Indications: Heartburn    ferrous sulfate 324 (65 Fe) MG tablet delayed-release EC tablet Take 1 tablet by mouth Daily With Breakfast. Indications: Iron Deficiency    fluticasone (FLONASE) 50 MCG/ACT nasal spray 1 spray into the nostril(s) as directed by provider Daily.    folic acid (FOLVITE) 1 MG tablet Take 1 tablet by mouth Daily. Indications: Anemia From Inadequate Folic Acid    furosemide (LASIX) 40 MG tablet Take 1 tablet by mouth Daily. Indications: Edema    isosorbide mononitrate (IMDUR) 60 MG 24 hr tablet Take 1 tablet by mouth Every Morning. Indications: Stable Angina Pectoris    leflunomide (ARAVA) 20 MG tablet Take 1 tablet by mouth every night at bedtime. Indications: Rheumatoid Arthritis    levothyroxine (SYNTHROID, LEVOTHROID) 75 MCG tablet Take 1 tablet by mouth Daily. Indications: Underactive Thyroid    Lidocaine 4 % patch Apply  topically every night at bedtime. To lower back  Indications: Arthritis Related to an Inflammatory Disorder    losartan (COZAAR) 50 MG tablet TAKE ONE TABLET BY MOUTH DAILY    Menthol-Zinc Oxide 0.45-20 % ointment Apply 1 application  topically 2 (Two) Times a Day. Apply to right buttock    multivitamin with minerals tablet tablet Take 1 tablet by mouth Daily.    nitroglycerin (Nitrostat) 0.4 MG SL tablet Place 1 tablet under the tongue Every 5 (Five) Minutes As Needed for Chest Pain. Take no more than 3 doses in 15 minutes.    predniSONE (DELTASONE) 5 MG tablet Take 1 tablet by mouth Daily. Indications: Acute Bursitis  "(Patient taking differently: Take 1 mg by mouth Daily. Indications: Acute Bursitis)    salsalate (DISALCID) 500 MG tablet 5 PILLS ON M-W-F-SUNDAY 4 PILLS ON T-TH-SAT  Indications: Rheumatoid Arthritis    sucralfate (CARAFATE) 1 g tablet Take 1 tablet by mouth 4 (Four) Times a Day Before Meals & at Bedtime. Indications: Peptic Ulcer    TiZANidine (Zanaflex) 2 MG capsule Take 1 capsule by mouth 2 (Two) Times a Day As Needed for Muscle Spasms.    traMADol (ULTRAM) 50 MG tablet Take 1 tablet by mouth Every 6 (Six) Hours As Needed for Moderate Pain.    valACYclovir (VALTREX) 500 MG tablet Take 1 tablet by mouth Daily. Indications: Shingles    fesoterodine fumarate (TOVIAZ ER) 4 MG tablet sustained-release 24 hour tablet Take 1 tablet by mouth Daily. (Patient not taking: Reported on 9/25/2023)    HYDROcodone-acetaminophen (NORCO) 7.5-325 MG per tablet Take 1 tablet by mouth Every 8 (Eight) Hours As Needed for Moderate Pain . (Patient not taking: Reported on 9/25/2023)    magnesium hydroxide (MILK OF MAGNESIA) 400 MG/5ML suspension Take 30 mL by mouth Daily As Needed for Constipation. Indications: Constipation (Patient not taking: Reported on 9/25/2023)    polyethylene glycol (MIRALAX) 17 GM/SCOOP powder Take 17 g by mouth Daily. Indications: Constipation (Patient not taking: Reported on 9/25/2023)     No current facility-administered medications on file prior to visit.      Venous Access Review  Line/IV site: No current IV Access  Antimicrobial Review  Currently on antibiotics/antifungals: YES/NO: YES  Start Date of Therapy: Cephalexin 7/20/22  Cefazolin 6/14/22-7/19/22  If therapy completed, date complete:     Review of Systems See HPI.    Vital Signs:  /75 (BP Location: Right arm, Patient Position: Sitting, Cuff Size: Adult)   Pulse 69   Temp 96.5 °F (35.8 °C)   Ht 167.6 cm (66\")   Wt 80.7 kg (178 lb)   LMP  (LMP Unknown)   SpO2 94%   BMI 28.73 kg/m²     Physical Exam  Vital signs - reviewed.  Right hip " with some soft tissue defect where she has had previous surgery and debridement.  She has had previous extended courses of antibiotic treatment.  She had had previous plastic surgery on the right hip.  She currently has no open wound.  There is no sinus tract.  There was no drainage.  There was no erythema.  There is no induration or tenderness in the defect base or surrounding area.  Photo was taken for her chart.  I was able to show her the picture of the right hip area since she cannot see it on her own.    Lab/Imaging/Other Information: She indicates she had labs at Dr. Chaidez her rheumatologist office about 2 months ago.    Impression & Recommendations:   Diagnoses and all orders for this visit:    1. Osteomyelitis of right hip (Primary)    She may have had adequate antibiotic treatment.  She had had recurrent problems involving the right hip.  Overall feel the safest approach is to try and prevent any recurrent wound/drainage.  Feel risk-benefit is in favor of continuing chronic suppression.  She is in agreement.  We will continue Keflex.  Follow-up in 4 to 5 months.  Happy to see her sooner if any new or worsening problems in the interim.    Follow Up:   Patient Instructions   Continue Keflex 1000 mg q12  Our office will get most recent labs from Dr Chaidez  Return for 4-5 months.  Patient was provided After Visit Summary.     MD Moises Foley MD

## 2023-09-25 ENCOUNTER — OFFICE VISIT (OUTPATIENT)
Age: 68
End: 2023-09-25

## 2023-09-25 VITALS
WEIGHT: 178 LBS | SYSTOLIC BLOOD PRESSURE: 116 MMHG | HEART RATE: 69 BPM | TEMPERATURE: 96.5 F | DIASTOLIC BLOOD PRESSURE: 75 MMHG | HEIGHT: 66 IN | BODY MASS INDEX: 28.61 KG/M2 | OXYGEN SATURATION: 94 %

## 2023-09-25 DIAGNOSIS — M86.9 OSTEOMYELITIS OF RIGHT HIP: Primary | ICD-10-CM

## 2023-09-26 ENCOUNTER — TREATMENT (OUTPATIENT)
Dept: PHYSICAL THERAPY | Facility: CLINIC | Age: 68
End: 2023-09-26
Payer: MEDICARE

## 2023-09-26 DIAGNOSIS — Z78.9 DECREASED ACTIVITIES OF DAILY LIVING (ADL): ICD-10-CM

## 2023-09-26 DIAGNOSIS — G82.20 LOWER PARAPLEGIA: Primary | ICD-10-CM

## 2023-09-26 DIAGNOSIS — G82.20 LOWER PARAPLEGIA: ICD-10-CM

## 2023-09-26 DIAGNOSIS — F44.4 FUNCTIONAL NEUROLOGICAL SYMPTOM DISORDER WITH WEAKNESS OR PARALYSIS: Primary | ICD-10-CM

## 2023-09-26 DIAGNOSIS — F44.4 FUNCTIONAL NEUROLOGICAL SYMPTOM DISORDER WITH WEAKNESS OR PARALYSIS: ICD-10-CM

## 2023-09-26 NOTE — PROGRESS NOTES
Physical Therapy Treatment Note  [address] [place] [address]    Patient: Tawny Shin                                                 Visit Date: 2023  :     1955    Referring practitioner:    Moises Oliveira MD  Date of Initial Visit:          Type: THERAPY  Noted: 2023    Patient seen for 26 sessions    Visit Diagnoses:    ICD-10-CM ICD-9-CM   1. Lower paraplegia  G82.20 344.1   2. Functional neurological symptom disorder with weakness or paralysis  F44.4 300.11     SUBJECTIVE     Subjective: Her elbows are hurting.       PAIN: 4/10         OBJECTIVE     Objective     Neuromuscular Reeducation     75348 Comments   BAPS RLE CW/CCW and 4 way L1    Timed Minutes 8     Therapeutic Activities    96135 Comments   Squatting holding on w/ one hand at FWW pulling up band w/ other hand to simulate pulling pants up    Several STS from WC and mat table to FWW CGA    Stand pivot WC <> Mat table using FWW CGA    Squats olding on w/ one hand at FWW and sliding other hand down leg to simulate pulling pants up    Timed Minutes 37       Therapy Education/Self Care 01992   Education offered today Reviewed HS stretch out of HEP   [employer] Code [id number]    Ongoing HEP   Date: 2023  Prepared by: Maria R Garay     Exercises  - Prone Knee Extension with Ankle Weight  - 2-3 x daily - 7 x weekly - 30-60 seconds hold  - Seated Hamstring Stretch with Chair  - 2-3 x daily - 7 x weekly - 30-60 seconds hold  - Supine Bridge  - 2 x daily - 7 x weekly - 2 sets - 10 reps  - Supine March  - 1-2 x daily - 7 x weekly - 2 sets - 10 reps  - Supine Active Straight Leg Raise  - 1-2 x daily - 7 x weekly - 2 sets - 10 reps   Timed Minutes        Total Timed Treatment:     45   mins  Total Time of Visit:             45   mins         ASSESSMENT/PLAN     GOALS  Goals                                            Progress Note due by 2023                                                                 Recert due by 11/29/2023   LTG by: 12 weeks Comments Date Status   Patient will report compliance with HEP.  Performing ~3x-4x/week 8/31 ongoing   Pt will be able to maintain static standing w/ 50/50 Wbing for >/=3 mins w/o UE support/a to improve capacity for ADL/IADLs 60# RLE, 150# LLE for ~2 mins 8/31 ongoing   Patient to perform TUG in </=2 mins w/ FWW without LOB for improved capacity for household ambulation. Able to ambulate 7 ft x 2 today 9/21 ongoing   Pt will be able to perform 5x STS w/ 50/50 Wbing in </=2 mins for improved functional strength 11.3 secs but asymmetrical 8/31 ongoing   Improve R ankle DF to neutral 3 deg from neutral PROM 8/31 ongoing    Improve gross B LE strength to >/=4/5 See MMT grid 8/31  ongoing       Assessment/Plan     ASSESSMENT: Emphasis today on improving functional strength and balance needed to don/doff pants in standing. She required VC for squat mechanics during this task to reduce lumbar mm strain. She was able to complete task w/ CGA, no LOB and demo'd sufficient standing tolerance to complete several reps of task prior to sitting. She has difficulty achieving sufficient R ankle DF to initiate threading of pants which was addressed w/ BAPS activities.    PLAN: PROGRESS NOTE NEXT SESSION. Continue to inquire about the issues with dizziness as well as work to improve her endurance and overall strength.    SIGNATURE: Molly Garrett, PT, [place] License #: [id number]  Electronically Signed on 9/26/2023        Donnie Cisneros. [id number]  [phone]

## 2023-09-26 NOTE — PROGRESS NOTES
Occupational Therapy Treatment Note  [address] [place] [address]    Patient: Tawny Shin                                                 Visit Date: 2023  :     1955    Referring practitioner:    Moises Oliveira MD  Date of Initial Visit:          Type: THERAPY  Noted: 2023    Patient seen for 8 sessions    Visit Diagnoses:    ICD-10-CM ICD-9-CM   1. Functional neurological symptom disorder with weakness or paralysis  F44.4 300.11   2. Decreased activities of daily living (ADL)  Z78.9 V49.89   3. Lower paraplegia  G82.20 344.1     SUBJECTIVE     Subjective:  Pt reports the past week has gone well, she has been able to perform some toilet transfers   Independently. Pt reports she has been performing the hip stretches that she reviewed last session.     PAIN: 3/10 in B elbows        OBJECTIVE     Objective     Therapeutic Exercises    06543 Units Comments   Completed digi flex 3# whole hand grasp and 1.5# isolated finger flexion B hands 20 reps x1.   Provided pt with information to obtain her own set of Digi-Flex for home.              Timed Minutes 10     Self-Care/IADL Training    63162 Comments   Performed toilet transfer from w/c to commode with use of grab bar and CGA. Simulated pulling pants up and down using thera band and the grab bar as support as that is the pt's home set-up. Requiring min cues for safe hand placement.     Practiced donning/doffing B shoes with use of thera band to cross each leg. Pt doffed B shoes independently. Pt required Jose R to abilio R shoes and maxA to abilio L shoe. Pt is unable to maintain legs crossed position without holding the crossed leg.                 Timed Minutes 30        Total Timed Treatment:     40  mins  Total Time of Visit:             40   mins         Therapy Education/Self Care 32680   Education offered today Encouraged pt to practice donning/doffing shoes and LB clothing  management during toileting with decreasing assist from caregivers.    [employer] Code    Ongoing HEP   BUE strengthening program   Practicing ADL and IADL tasks with decreasing assist from caregivers.    Timed Minutes        ASSESSMENT/PLAN     GOALS:        Goals                                          Progress Note due by 10/14/23                                                      Recert due by 11/14/23   STG by: 10/14/23 Comments Date Status   Pt will be independent with daily completion of a HEP to address B UE and core strength deficits.    Pt continues to complete her HEP daily.   Ongoing    Pt will complete all aspects of LB dressing and toileting with Mod I.  Practiced doffing/donning B shoes requiring mod-maxA.    Ongoing    Pt will increase B UE strength to 5/5 at all joints to increase ADL and IADL independence.     Progressing             LTG by: 10/14/23        Pt will complete all functional ADL transfers with Mod I and good safety.  Completed w/c<>toilet transfer with use of grab bar and CGA.    Progressing    Pt will complete simulated household IADL tasks in sitting or standing when possible displaying Mod I and good safety.     Progressing    Pt will increase B hand  strength to 30# for improved performance with ADL and IADL tasks.    Completed  strengthening with min difficulty.    Ongoing                                     Assessment/Plan     ASSESSMENT:   Pt continues to demonstrate improvements in ADL performance. Pt continues to   Progress in performing ADL and IADL tasks at home with decreasing assist from   Her caregivers. Pt demo'd impaired hip flexion and external rotation during LE   Crossing for shoe management. Pt continues to demonstrate improvements in   B  strength.     PLAN:   Will continue to focus on ADL/IADL performance and increasing independence at home.     Signature:  Denise Flynn OT Student  Electronically signed on 9/26/2023    The clinical instructor  and/or supervising staff, KATHYA Engle/L, was present in clinic guiding the visit by approving, concurring, and confirming the skilled judgement for all services rendered.    Signature:  KATHYA Engle/L, [place] License #: [id number]    Electronically signed on 9/26/2023          [address] [place] [address]  [phone]

## 2023-09-28 ENCOUNTER — TREATMENT (OUTPATIENT)
Dept: PHYSICAL THERAPY | Facility: CLINIC | Age: 68
End: 2023-09-28
Payer: MEDICARE

## 2023-09-28 DIAGNOSIS — G82.20 LOWER PARAPLEGIA: ICD-10-CM

## 2023-09-28 DIAGNOSIS — G82.20 LOWER PARAPLEGIA: Primary | ICD-10-CM

## 2023-09-28 DIAGNOSIS — Z78.9 DECREASED ACTIVITIES OF DAILY LIVING (ADL): ICD-10-CM

## 2023-09-28 DIAGNOSIS — F44.4 FUNCTIONAL NEUROLOGICAL SYMPTOM DISORDER WITH WEAKNESS OR PARALYSIS: Primary | ICD-10-CM

## 2023-09-28 DIAGNOSIS — F44.4 FUNCTIONAL NEUROLOGICAL SYMPTOM DISORDER WITH WEAKNESS OR PARALYSIS: ICD-10-CM

## 2023-09-28 NOTE — PROGRESS NOTES
Physical Therapy Treatment Note and 30 Day Progress Note  [address] [place] [address]    Patient: Tawny Shin                                                 Visit Date: 2023  :     1955    Referring practitioner:    Moises Oliveira MD  Date of Initial Visit:          Type: THERAPY  Noted: 2023    Patient seen for 27 sessions    Visit Diagnoses:    ICD-10-CM ICD-9-CM   1. Lower paraplegia  G82.20 344.1   2. Functional neurological symptom disorder with weakness or paralysis  F44.4 300.11     SUBJECTIVE     Subjective: Nothing new to report.    PAIN: 4/10         OBJECTIVE     Objective      LE MMT   HIP Strength L Strength R   ABD 4-  3-   KNEE       extension 5  4-   ANKLE       dorsiflexion  4+  2       Therapeutic Activities    81591 Comments   See goals    Timed Minutes 15     Therapeutic Exercises    86982 Units Comments   See MMT     DL, SL SB bridge 3 sec hold *15 e    See goals     Timed Minutes 20     Gait Training          57301   Task/Terrain Asst AD Comments   Gait training on carpet CGA + WC follow of 2nd FWW 14', 8' w/ a turn   Timed Minutes 10       Therapy Education/Self Care 03471   Education offered today Reviewed HS stretch out of HEP   [employer] Code [id number]    Ongoing HEP   Date: 2023  Prepared by: Maria R Garay     Exercises  - Prone Knee Extension with Ankle Weight  - 2-3 x daily - 7 x weekly - 30-60 seconds hold  - Seated Hamstring Stretch with Chair  - 2-3 x daily - 7 x weekly - 30-60 seconds hold  - Supine Bridge  - 2 x daily - 7 x weekly - 2 sets - 10 reps  - Supine March  - 1-2 x daily - 7 x weekly - 2 sets - 10 reps  - Supine Active Straight Leg Raise  - 1-2 x daily - 7 x weekly - 2 sets - 10 reps   Timed Minutes        Total Timed Treatment:     45   mins  Total Time of Visit:             45   mins         ASSESSMENT/PLAN     GOALS  Goals                                             Progress Note due by 10/27/2023                                                                Recert due by 11/29/2023   LTG by: 12 weeks Comments Date Status   Patient will report compliance with HEP.  Performing ~3x-4x/week 9/28 ongoing   Pt will be able to maintain static standing w/ 50/50 Wbing for >/=3 mins w/o UE support/a to improve capacity for ADL/IADLs 60# RLE, 150# LLE for 51.88 secs 9/28 ongoing   Patient to perform TUG in </=2 mins w/ FWW without LOB for improved capacity for household ambulation. Able to ambulate 14 ft, 8' today 9/28 ongoing   Pt will be able to perform 5x STS w/ 50/50 Wbing in </=2 mins for improved functional strength 37.95 secs but asymmetrical, ~60# at most on RLE 9/28 ongoing   Improve R ankle DF to neutral 3 deg from neutral PROM, no change 9/28 ongoing    Improve gross B LE strength to >/=4/5 See MMT grid 9/28  ongoing       Assessment & Plan       Assessment  Impairments: abnormal coordination, abnormal gait, abnormal muscle firing, abnormal muscle tone, abnormal or restricted ROM, activity intolerance, impaired balance, impaired physical strength, lacks appropriate home exercise program, pain with function, safety issue and weight-bearing intolerance   Functional limitations: carrying objects, lifting, sleeping, walking, pulling, pushing, uncomfortable because of pain, moving in bed, sitting, standing, stooping, reaching behind back, reaching overhead and unable to perform repetitive tasks   Prognosis: good    Plan  Therapy options: will be seen for skilled therapy services  Planned modality interventions: thermotherapy (hydrocollator packs), cryotherapy, low level laser therapy, TENS, dry needling, electrical stimulation/Gambian stimulation and ultrasound  Planned therapy interventions: abdominal trunk stabilization, manual therapy, ADL retraining, motor coordination training, balance/weight-bearing training, neuromuscular re-education, body mechanics training, postural  training, soft tissue mobilization, spinal/joint mobilization, fine motor coordination training, flexibility, functional ROM exercises, strengthening, gait training, stretching, home exercise program, therapeutic activities, IADL retraining, joint mobilization and transfer training  Frequency: 2x week  Duration in weeks: 12  Treatment plan discussed with: patient       ASSESSMENT: Progress note performed per POC. She demo's good progression toward initial goals as evidenced by extended ambulatory distance though has not yet formally achieved any goals at this time. Remaining deficits include difficulty w/ equal Wbing during standing and gait training; impaired RLE > LLE strength; and early fatigue onset. Her functional activity tolerance has improved and she is now able to perform stand pivot tx w/ FWW independently w/ FWW. She was actually able to perform a stand pivot car transfer w/ FWW independently which she was unable to perform prior to her time in skilled OP PT. She continues to benefit from skilled OP PT services to optimize safety and independence w/ functional mobility.    PLAN: Continue to inquire about the issues with dizziness as well as work to improve her endurance and overall strength.    SIGNATURE: Molly Garrett, YESI, [place] License #: [id number]  Electronically Signed on 9/28/2023        Donnie Cisneros. [id number]  [phone]

## 2023-09-28 NOTE — PROGRESS NOTES
Occupational Therapy Treatment Note  [address] [place] [address]    Patient: Tawny Shin                                                 Visit Date: 2023  :     1955    Referring practitioner:    Moises Oliveira MD  Date of Initial Visit:          Type: THERAPY  Noted: 2023    Patient seen for 9 sessions    Visit Diagnoses:    ICD-10-CM ICD-9-CM   1. Functional neurological symptom disorder with weakness or paralysis  F44.4 300.11   2. Decreased activities of daily living (ADL)  Z78.9 V49.89   3. Lower paraplegia  G82.20 344.1     SUBJECTIVE     Subjective:  Pt reports the past week has gone well. She has been trying to abilio he shoes on her own, but has   Been unsuccessful so far. Pt reports she has been having conversations with her caregivers about   Allowing her to perform more ADL tasks. Pt reports she performed a car transfer into an [facility] using   her standard wheelchair with minimum assist.     PAIN: 0/10        OBJECTIVE     Objective     Therapeutic Exercises    23105 Units Comments   B UE strengthening exercises with 2# free weight. Completed shoulder press, internal/external rotation, shoulder abduction, tricep presses, and bicep curls 15 reps x1.                Timed Minutes 15     Self-Care/IADL Training    41235 Comments   Practiced doffing and donning B shoes using AE and modified technique while seated in power w/c. Required modA for donning shoes, independent for doffing shoes, Jose R for doffing socks, and Denis for donning socks. Pt used a theraband loop, shoe horn, and sock aid for sock and shoe management. Pt required extended time and verbal cues to problem solve techniques.                     Timed Minutes 30      Total Timed Treatment:     45  mins  Total Time of Visit:             45   mins         Therapy Education/Self Care 90411   Education offered today Encouraged pt to practice donning/doffing  shoes at home with modified technique and AE.    [employer] Code    Ongoing HEP   BUE strengthening program   Practicing ADL and IADL tasks with decreasing assist from caregivers.    Timed Minutes        ASSESSMENT/PLAN     GOALS:        Goals                                          Progress Note due by 10/14/23                                                      Recert due by 11/14/23   STG by: 10/14/23 Comments Date Status   Pt will be independent with daily completion of a HEP to address B UE and core strength deficits.    Pt continues to complete her HEP daily.   Ongoing    Pt will complete all aspects of LB dressing and toileting with Mod I.  Pt reports continued practice at home of LB clothing management during toileting.    Ongoing    Pt will increase B UE strength to 5/5 at all joints to increase ADL and IADL independence.  Performed B UE strengthening exercises with 2# free weight.    Progressing             LTG by: 10/14/23        Pt will complete all functional ADL transfers with Mod I and good safety.     Progressing    Pt will complete simulated household IADL tasks in sitting or standing when possible displaying Mod I and good safety.  Practiced dynamic reaching seated in power w/c during dressing practice.   Progressing    Pt will increase B hand  strength to 30# for improved performance with ADL and IADL tasks.       Ongoing                                     Assessment/Plan     ASSESSMENT:   Pt continues demonstrate improvements in ADL performance in the clinic and  At home with decreased need for assistance. Pt displayed improvements in B   UE strength. Pt reports no pain which has improved since previous sessions. Pt   Continues to struggle with LB clothing and shoe management, which we addressed   Today. Pt and therapist were able to problem solve modified techniques which   Worked best for the pt using AE.     PLAN:   Will continue to focus on B hand  strength and functional transfers  for ADL/IADL   Performance.     Signature:  Denise Flynn OT Student  Electronically signed on 9/28/2023    The clinical instructor and/or supervising staff, TIFFANY Padilla, was present in clinic guiding the visit by approving, concurring, and confirming the skilled judgement for all services rendered.    Signature:  TIFFANY Padilla, [place] License #: [id number]  Electronically signed on 9/28/2023       [address] [place] [address], [place] [address]  [phone]

## 2023-10-03 ENCOUNTER — TREATMENT (OUTPATIENT)
Dept: PHYSICAL THERAPY | Facility: CLINIC | Age: 68
End: 2023-10-03
Payer: MEDICARE

## 2023-10-03 DIAGNOSIS — G82.20 LOWER PARAPLEGIA: ICD-10-CM

## 2023-10-03 DIAGNOSIS — F44.4 FUNCTIONAL NEUROLOGICAL SYMPTOM DISORDER WITH WEAKNESS OR PARALYSIS: Primary | ICD-10-CM

## 2023-10-03 DIAGNOSIS — Z78.9 DECREASED ACTIVITIES OF DAILY LIVING (ADL): ICD-10-CM

## 2023-10-03 NOTE — PROGRESS NOTES
Physical Therapy Treatment Note  [address] [place] [address]    Patient: Tawny Shin                                                 Visit Date: 10/3/2023  :     1955    Referring practitioner:    Moises Oliveira MD  Date of Initial Visit:          Type: THERAPY  Noted: 2023    Patient seen for 28 sessions    Visit Diagnoses:    ICD-10-CM ICD-9-CM   1. Functional neurological symptom disorder with weakness or paralysis  F44.4 300.11   2. Lower paraplegia  G82.20 344.1     SUBJECTIVE     Subjective: Nothing new to report, no new falls or near falls. Denies dizziness. She was able to transfer into a taller [facility] for the first time in a long time. She got out her standard w/c > walker and was able to lower the seat to complete the t/f.     PAIN: 0/10 > unchanged      OBJECTIVE     Objective     Therapeutic Exercises    84915 Units Comments   DL bridge over 45 cm SB  2 x 10    B SL hip abd against gravity  2 x 5    Seated B LE unsupported: B marches 2 x 10 ea         Timed Minutes 30     Therapeutic Activities    25721 Comments   W/C <> mat table t/f CGA   Supine <> SL t/f supervision   Sit <> stand t/f  CGA   Standing for endurance  3:51 min       Timed Minutes 15     Therapy Education/Self Care 64381   Education offered today    [facility] Code [id number]    Ongoing HEP   Date: 2023  Prepared by: Maria R Garay     Exercises  - Prone Knee Extension with Ankle Weight  - 2-3 x daily - 7 x weekly - 30-60 seconds hold  - Seated Hamstring Stretch with Chair  - 2-3 x daily - 7 x weekly - 30-60 seconds hold  - Supine Bridge  - 2 x daily - 7 x weekly - 2 sets - 10 reps  - Supine March  - 1-2 x daily - 7 x weekly - 2 sets - 10 reps  - Supine Active Straight Leg Raise  - 1-2 x daily - 7 x weekly - 2 sets - 10 reps   Timed Minutes      Total Timed Treatment:     45   mins  Total Time of Visit:             45   mins         ASSESSMENT/PLAN      GOALS  Goals                                            Progress Note due by 10/27/2023                                                                Recert due by 11/29/2023   LTG by: 12 weeks Comments Date Status   Patient will report compliance with HEP.  Performing ~3x-4x/week 9/28 ongoing   Pt will be able to maintain static standing w/ 50/50 Wbing for >/=3 mins w/o UE support/a to improve capacity for ADL/IADLs 60# RLE, 150# LLE for 51.88 secs 9/28  3:51 min decreased WB R LE 10/3/23 10/3 ongoing   Patient to perform TUG in </=2 mins w/ FWW without LOB for improved capacity for household ambulation. Able to ambulate 14 ft, 8' today 9/28 ongoing   Pt will be able to perform 5x STS w/ 50/50 Wbing in </=2 mins for improved functional strength 37.95 secs but asymmetrical, ~60# at most on RLE 9/28 ongoing   Improve R ankle DF to neutral 3 deg from neutral PROM, no change 9/28 ongoing    Improve gross B LE strength to >/=4/5 See MMT grid 9/28  ongoing     Assessment/Plan     ASSESSMENT: Patient tends to compensate with hip flexors when attempting to activate hip abductors, demonstrating difficulty to overcome gravity. She demonstrates decreased R LE WB, demonstrated L LE WB preference. She was able to remain standing 3:51 minutes before fatigue required seated rest break today.     PLAN: Consider gait training with pt personal goal of 12 steps next session. Continue to inquire about the issues with dizziness as well as work to improve her endurance and overall strength.    SIGNATURE: Maria R Garay PTA, [place] License #: [id number]  Electronically Signed on 10/3/2023        [place] [address]  [phone]

## 2023-10-03 NOTE — PROGRESS NOTES
Occupational Therapy Treatment Note  [address] [place], [place] [address]    Patient: Tawny Shin                                                 Visit Date: 10/3/2023  :     1955    Referring practitioner:    Moises Oliveira MD  Date of Initial Visit:          Type: THERAPY  Noted: 2023    Patient seen for 10 sessions    Visit Diagnoses:    ICD-10-CM ICD-9-CM   1. Functional neurological symptom disorder with weakness or paralysis  F44.4 300.11   2. Lower paraplegia  G82.20 344.1   3. Decreased activities of daily living (ADL)  Z78.9 V49.89     SUBJECTIVE     Subjective:    Pt reports she is feeling good today. Pt continues to try and complete more tasks at home when  Able to and her caregiver allows her to. Pt reports she is still waiting on her shoes without laces   To increase her independence when donning shoes.      PAIN: 0/10        OBJECTIVE     Objective     Therapeutic Exercises    15249 Units Comments   Pt completed B UE AROM exercises using 3# dumbbell performing  shoulder internal/external rotation, elbow flexion/extension, tricep extension, shoulder flexion performing 1 set of 10 reps each.   Pt struggled with tricep extension using 3# dumbbell but was able to complete her set.             Timed Minutes 15     Self-Care/IADL Training    19953 Comments   Pt completed all steps of making cookies using bowl, spoon, cookie mix, butter. Pt was able to put all ingredients into the bowl and mix. Pt had mod difficulty mixing due to forgetting to melt the butter. Pt was able to mix all ingredients while in sitting and then was able to stand at the stove to roll up cookies using B hands while in standing. Pt rolled 6 cookies and then had to roll the rest of the cookies while in sitting. Pt then put cookie sheet into the oven and set the timer with min A. Pt was able to remove the cookies from oven using oven mitts with min cues  for safety. Pt was able to place cookies onto plate using spatula.    Sit<>stand transfers completed with CGA/Min A.                 Timed Minutes 30      Total Timed Treatment:     45  mins  Total Time of Visit:             45   mins         Therapy Education/Self Care 65838   Education offered today Encouraged pt to practice donning/doffing shoes at home with modified technique and AE.    [facility] Code    Ongoing HEP   BUE strengthening program   Practicing ADL and IADL tasks with decreasing assist from caregivers.    Timed Minutes        ASSESSMENT/PLAN     GOALS:        Goals                                          Progress Note due by 10/14/23                                                      Recert due by 11/14/23   STG by: 10/14/23 Comments Date Status   Pt will be independent with daily completion of a HEP to address B UE and core strength deficits.    Pt continues to complete her HEP daily.   Ongoing    Pt will complete all aspects of LB dressing and toileting with Mod I.  Pt reports continued practice at home of LB clothing management during toileting.    Ongoing    Pt will increase B UE strength to 5/5 at all joints to increase ADL and IADL independence.  Performed B UE strengthening exercises with 3# dumbbell    Progressing             LTG by: 10/14/23        Pt will complete all functional ADL transfers with Mod I and good safety.  Sit<>stand completed with CGA/min A and RW.   Progressing    Pt will complete simulated household IADL tasks in sitting or standing when possible displaying Mod I and good safety.  Completed entire cooking making routine focusing on endurance and safety.   Progressing    Pt will increase B hand  strength to 30# for improved performance with ADL and IADL tasks.       Ongoing                                     Assessment/Plan     ASSESSMENT:   Pt completed cookie making IADL task in the kitchen today focusing  On endurance and safety while cooking. Pt was able to stand  when  Putting cookies onto cookie tray but after rolling 6 cookie balls, pt had  To sit to complete remaining cookies. Pt also increased UE strengthening  Weight to 3#.       PLAN:   Will focus on BSC t/f to improve nightly toileting routine and increase safety.      Signature:  KATHYA Padilla/L, [place] License #: [id number]  Electronically signed on 10/3/2023       [address] [place] [address], [place] [address]  [phone]

## 2023-10-05 ENCOUNTER — TREATMENT (OUTPATIENT)
Dept: PHYSICAL THERAPY | Facility: CLINIC | Age: 68
End: 2023-10-05
Payer: MEDICARE

## 2023-10-05 DIAGNOSIS — F44.4 FUNCTIONAL NEUROLOGICAL SYMPTOM DISORDER WITH WEAKNESS OR PARALYSIS: Primary | ICD-10-CM

## 2023-10-05 DIAGNOSIS — G82.20 LOWER PARAPLEGIA: ICD-10-CM

## 2023-10-05 DIAGNOSIS — Z78.9 DECREASED ACTIVITIES OF DAILY LIVING (ADL): ICD-10-CM

## 2023-10-05 NOTE — PROGRESS NOTES
Physical Therapy Treatment Note  [address] [place] [address]    Patient: Tawny Shin                                                 Visit Date: 10/5/2023  :     1955    Referring practitioner:    Moises Oliveira MD  Date of Initial Visit:          Type: THERAPY  Noted: 2023    Patient seen for 29 sessions    Visit Diagnoses:    ICD-10-CM ICD-9-CM   1. Functional neurological symptom disorder with weakness or paralysis  F44.4 300.11   2. Lower paraplegia  G82.20 344.1     SUBJECTIVE     Subjective: Nothing new to report, no new falls or near falls. Denies dizziness. OT reports pt is really tight in her hips which limits her t/f's and donning clothing/shoes. Patient reports she is going to start working on toileting at home.     PAIN: 0/10 > 4/10 (R knee)       OBJECTIVE     Objective     Therapeutic Exercises    34995 Units Comments   B hip stretches passively, supine     B hamstring stretches passively     B SL hip ext over 45 cm SB with manual OP for anterior hip stretch     Bolstered and reviewed HEP with pt  See education table        Timed Minutes 35     Gait Training          03108   Task/Terrain Asst AD Comments   Gait training in hallway  CGA FWW Step-to d/t decreased strength/WB on L LE. Avoids heel strike secondary to decreased R ankle ROM, would benefit from knee block during stance phase on R. 20 ft, 12 ft                Timed Minutes 15     Therapy Education/Self Care 33030   Education offered today See below   [facility] Code [id number]    Ongoing HEP   Date: 10/05/2023  Prepared by: Maria R Garay    Exercises  - Prone Knee Extension with Ankle Weight  - 2-3 x daily - 7 x weekly - 30-60 seconds hold  - Seated Hamstring Stretch with Chair  - 2-3 x daily - 7 x weekly - 30-60 seconds hold  - Supine Bridge  - 2 x daily - 7 x weekly - 2 sets - 10 reps  - Supine March  - 1-2 x daily - 7 x weekly - 2 sets - 10 reps  -  Supine Active Straight Leg Raise  - 1-2 x daily - 7 x weekly - 2 sets - 10 reps  - Seated Hamstring Stretch with Chair  - 1-2 x daily - 7 x weekly - 2 sets - 10 reps  - Belt Assisted Dorsiflexion   - 1-2 x daily - 7 x weekly - 2 sets - 10 reps  - Standing Bilateral Gastroc Stretch with Step  - 1-2 x daily - 7 x weekly - 2 sets - 10 reps   Timed Minutes      Total Timed Treatment:     50   mins  Total Time of Visit:             50   mins         ASSESSMENT/PLAN     GOALS  Goals                                            Progress Note due by 10/27/2023                                                                Recert due by 11/29/2023   LTG by: 12 weeks Comments Date Status   Patient will report compliance with HEP.  Bolstered today  10/5 ongoing   Pt will be able to maintain static standing w/ 50/50 Wbing for >/=3 mins w/o UE support/a to improve capacity for ADL/IADLs 60# RLE, 150# LLE for 51.88 secs 9/28  3:51 min decreased WB R LE 10/3/23 10/3 ongoing   Patient to perform TUG in </=2 mins w/ FWW without LOB for improved capacity for household ambulation. Able to ambulate 14 ft, 8' today 9/28 ongoing   Pt will be able to perform 5x STS w/ 50/50 Wbing in </=2 mins for improved functional strength 37.95 secs but asymmetrical, ~60# at most on RLE 9/28 ongoing   Improve R ankle DF to neutral 3 deg from neutral PROM, no change 9/28 ongoing    Improve gross B LE strength to >/=4/5 See MMT grid 9/28  ongoing     Assessment/Plan     ASSESSMENT: OT reported pt struggles with ADLs (ie donning shoes, getting dressed, shower t/f) d/t decreased flexibility therefore addressed flexibility prior to ambulation today. She demonstrated significant restrictions on R LE, with pt c/o increased R knee pain at conclusion of stretches. She was able to ambulate double the distance walked last attempt. In total, she ambulated 32 feet with 1 seated rest break. She was limited by L LE WB and R LE flexibility.     PLAN: Continue to address  flexibility and mobility R LE > L. Continue to inquire about the issues with dizziness as well as work to improve her endurance and overall strength.    SIGNATURE: Maria R Garay PTA, [place] License #: [id number]  Electronically Signed on 10/5/2023        [address] [place] [address]  [phone]

## 2023-10-10 ENCOUNTER — TREATMENT (OUTPATIENT)
Dept: PHYSICAL THERAPY | Facility: CLINIC | Age: 68
End: 2023-10-10
Payer: MEDICARE

## 2023-10-10 DIAGNOSIS — Z78.9 DECREASED ACTIVITIES OF DAILY LIVING (ADL): ICD-10-CM

## 2023-10-10 DIAGNOSIS — F44.4 FUNCTIONAL NEUROLOGICAL SYMPTOM DISORDER WITH WEAKNESS OR PARALYSIS: Primary | ICD-10-CM

## 2023-10-10 DIAGNOSIS — G82.20 LOWER PARAPLEGIA: ICD-10-CM

## 2023-10-10 PROCEDURE — 97530 THERAPEUTIC ACTIVITIES: CPT

## 2023-10-10 PROCEDURE — 97110 THERAPEUTIC EXERCISES: CPT | Performed by: PHYSICAL THERAPIST

## 2023-10-10 PROCEDURE — 97110 THERAPEUTIC EXERCISES: CPT

## 2023-10-10 NOTE — PROGRESS NOTES
Occupational Therapy Treatment Note  [address] [place] [address]    Patient: Tawny Shin                                                 Visit Date: 10/10/2023  :     1955    Referring practitioner:    Moises Oliveira MD  Date of Initial Visit:          Type: THERAPY  Noted: 2023    Patient seen for 12 sessions    Visit Diagnoses:    ICD-10-CM ICD-9-CM   1. Functional neurological symptom disorder with weakness or paralysis  F44.4 300.11   2. Decreased activities of daily living (ADL)  Z78.9 V49.89   3. Lower paraplegia  G82.20 344.1     SUBJECTIVE     Subjective:    Pt reports she has pain in her L arm again and she has been using biofreeze which helps with some   Of the pain and soreness. Pt reports that otherwise she has had a good week and has been enjoying   Time outside.      PAIN: 310 in L elbow and hand         OBJECTIVE     Objective     Therapeutic Activities    04229 Comments   Completed functional transfer with mod I; w/c<>mat table with use of FWW.     Performed dynamic reaching activity picking up tall (4) and short (6) cones from sitting EOM with mod difficulty.     Pt hit ball back and forth with therapist using a 4# dowel bar focusing on B UE strength and control alternating dominant hand. Completed for approx. 5 min with min difficulty and no rest breaks.             Timed Minutes 15     Therapeutic Exercises    67468 Units Comments   B UE strengthening exercises using 3# free weight, bicep curls and shoulder internal/external rotation 20 reps x1; shoulder flexion 10 reps x1. Completed shoulder abduction using 2# free weight 20 reps x1.      Completed B UE tricep presses using 2# free weight for  20 reps x1.      Completed hip flexor stretching using thera-band for 10 reps with R leg and 5 reps with L leg. Focusing on LE flexibility to improve performance of LB ADLs.                Timed Minutes 30      Total Timed Treatment:     45  mins  Total Time of Visit:             45   mins       Therapy Education/Self Care 96134   Education offered today Encouraged pt to begin dressing sitting EOB rather than in w/c to improve core stability and dynamic reaching.    [employer] Code    Ongoing HEP   BUE strengthening program   Practicing ADL and IADL tasks with decreasing assist from caregivers.    Timed Minutes        ASSESSMENT/PLAN     GOALS:        Goals                                          Progress Note due by 10/14/23                                                      Recert due by 11/14/23   STG by: 10/14/23 Comments Date Status   Pt will be independent with daily completion of a HEP to address B UE and core strength deficits.    Pt continues to complete her HEP daily.   Ongoing    Pt will complete all aspects of LB dressing and toileting with Mod I.  Completed B hip stretching to improve LE flexibility for LB ADL performance.    Ongoing    Pt will increase B UE strength to 5/5 at all joints to increase ADL and IADL independence.  Completed B UE strengthening exercises with 3# and 2# free weights.   Completed dynamic 4# dowel bar activity to improve B UE strength.    Progressing             LTG by: 10/14/23        Pt will complete all functional ADL transfers with Mod I and good safety.  Completed w/c<>mat table transfer with mod I using FWW.    Progressing    Pt will complete simulated household IADL tasks in sitting or standing when possible displaying Mod I and good safety.     Progressing    Pt will increase B hand  strength to 30# for improved performance with ADL and IADL tasks.       Ongoing                                     Assessment/Plan     ASSESSMENT:   Pt continues to struggle with dynamic reaching from waist level to floor d/t B hip   Tightness and core instability. Pt demonstrated improvements in B UE strength   And tolerance of strengthening and endurance exercises. Pt continues to  struggle   With hip mobility for LB ADL participation.     PLAN:   Will complete progress note next session.       Signature:  Denise Flynn OT Student  Electronically signed on 10/10/2023     The clinical instructor and/or supervising staff, TIFFANY Padilla, was present in clinic guiding the visit by approving, concurring, and confirming the skilled judgement for all services rendered.    Signature:  TIFFANY Padilla, [place] License #: [id number]  Electronically signed on 10/10/2023         [address] [place] [address], [place] [address]  [phone]

## 2023-10-10 NOTE — PROGRESS NOTES
Physical Therapy Treatment Note  [address] [place] [address]    Patient: Tawny Shin                                                 Visit Date: 10/10/2023  :     1955    Referring practitioner:    Moises Oliveira MD  Date of Initial Visit:          Type: THERAPY  Noted: 2023    Patient seen for 30 sessions    Visit Diagnoses:    ICD-10-CM ICD-9-CM   1. Functional neurological symptom disorder with weakness or paralysis  F44.4 300.11     SUBJECTIVE     Subjective:She reports she had difficulty getting her foot on opposing knee with OT today.       PAIN: 3/10         OBJECTIVE     Objective     Therapeutic Exercises    15533 Units Comments   TUG trial x 1  1:13.09   B pec and thoracic stretches with 1/2 foam roller in sitting     B DF and TKE stretches with pink bolster     B SL clamshells with green T band x 20     Bridging x 20     B unilateral ER stretches with intermittent inferior lateral glides          Timed Minutes 45      Therapy Education/Self Care 04779   Education offered today    [facility] Code [id number]    Ongoing HEP   Date: 10/05/2023  Prepared by: Maria R Garay     Exercises  - Prone Knee Extension with Ankle Weight  - 2-3 x daily - 7 x weekly - 30-60 seconds hold  - Seated Hamstring Stretch with Chair  - 2-3 x daily - 7 x weekly - 30-60 seconds hold  - Supine Bridge  - 2 x daily - 7 x weekly - 2 sets - 10 reps  - Supine March  - 1-2 x daily - 7 x weekly - 2 sets - 10 reps  - Supine Active Straight Leg Raise  - 1-2 x daily - 7 x weekly - 2 sets - 10 reps  - Seated Hamstring Stretch with Chair  - 1-2 x daily - 7 x weekly - 2 sets - 10 reps  - Belt Assisted Dorsiflexion   - 1-2 x daily - 7 x weekly - 2 sets - 10 reps  - Standing Bilateral Gastroc Stretch with Step  - 1-2 x daily - 7 x weekly - 2 sets - 10 reps   Timed Minutes        Total Timed Treatment:     45   mins  Total Time of Visit:             45   mins          ASSESSMENT/PLAN     GOALS  Goals                                            Progress Note due by 10/27/2023                                                                Recert due by 11/29/2023   LTG by: 12 weeks Comments Date Status   Patient will report compliance with HEP.  Bolstered today  10/5 ongoing   Pt will be able to maintain static standing w/ 50/50 Wbing for >/=3 mins w/o UE support/a to improve capacity for ADL/IADLs 60# RLE, 150# LLE for 51.88 secs 9/28  3:51 min decreased WB R LE 10/3/23 10/3 ongoing   Patient to perform TUG in </=2 mins w/ FWW without LOB for improved capacity for household ambulation. 1:13.09 10/10 ongoing   Pt will be able to perform 5x STS w/ 50/50 Wbing in </=2 mins for improved functional strength 37.95 secs but asymmetrical, ~60# at most on RLE 9/28 ongoing   Improve R ankle DF to neutral 3 deg from neutral PROM, no change 9/28 ongoing    Improve gross B LE strength to >/=4/5 See MMT grid 9/28  ongoing       Assessment/Plan     ASSESSMENT:   Today was the first time she performed a TUG. Her K LE did buckle 3/4 of the way through the TUG and she required minAx1 to regain her balance and prepare to continue.    PLAN:   Consider working on her B hip ER to make It easier for her to progress with OT donning/doffing shoes and socks.    SIGNATURE: Kentrell Lamb PTA, [place] License #: [id number]  Electronically Signed on 10/10/2023        [place] [address]  [phone]

## 2023-10-12 ENCOUNTER — TREATMENT (OUTPATIENT)
Dept: PHYSICAL THERAPY | Facility: CLINIC | Age: 68
End: 2023-10-12
Payer: MEDICARE

## 2023-10-12 DIAGNOSIS — F44.4 FUNCTIONAL NEUROLOGICAL SYMPTOM DISORDER WITH WEAKNESS OR PARALYSIS: Primary | ICD-10-CM

## 2023-10-12 DIAGNOSIS — G82.20 LOWER PARAPLEGIA: ICD-10-CM

## 2023-10-12 DIAGNOSIS — Z78.9 DECREASED ACTIVITIES OF DAILY LIVING (ADL): ICD-10-CM

## 2023-10-12 NOTE — PROGRESS NOTES
Physical Therapy Treatment Note  [address] [place] [address]    Patient: Tawny Shin                                                 Visit Date: 10/12/2023  :     1955    Referring practitioner:    Moises Oliveira MD  Date of Initial Visit:          Type: THERAPY  Noted: 2023    Patient seen for 31 sessions    Visit Diagnoses:    ICD-10-CM ICD-9-CM   1. Functional neurological symptom disorder with weakness or paralysis  F44.4 300.11   2. Lower paraplegia  G82.20 344.1     SUBJECTIVE     Subjective: She reports increasing weakness, limiting her ability to independently perform bed t/f. No new c/c, denies falls/near falls.     PAIN: 0/10     OBJECTIVE     Objective     Therapeutic Activities    43951 Comments   Bed t/f sitting <> supine Jose R   W/C <> mat table CGA               Timed Minutes 15     Therapeutic Exercises    80622 Units Comments   B unilateral ER stretches      Seated anti-rotations with RIP  2 x 60 sec Too difficult with pillow   Seated B alt marches 2 x 10  Added to HEP   Seated LAQ 2 x 10  Added to HEP   Seated crunch against RIP  x10    B hip IR/ER stretches passively   R > L        Timed Minutes 25      Therapy Education/Self Care 29681   Education offered today    [facility] Code [email or web address]    Ongoing HEP   Date: 10/12/2023  Prepared by: Maria R Hicks    Bed:  - Prone Knee Extension with Ankle Weight  - 2-3 x daily - 7 x weekly - 30-60 seconds hold  - Supine Bridge  - 2 x daily - 7 x weekly - 2 sets - 10 reps  - Supine March  - 1-2 x daily - 7 x weekly - 2 sets - 10 reps  - Supine Active Straight Leg Raise  - 1-2 x daily - 7 x weekly - 2 sets - 10 reps    Chair  - Seated Hamstring Stretch with Chair  - 1-2 x daily - 7 x weekly - 2 sets - 10 reps  - Belt Assisted Dorsiflexion   - 1-2 x daily - 7 x weekly - 2 sets - 10 reps  - Standing Bilateral Gastroc Stretch with Step  - 1-2 x daily  - 7 x weekly - 2 sets - 10 reps  - Seated Marching with Opposite Shoulder Flexion  - 1-2 x daily - 7 x weekly - 2 sets - 10 reps  - Seated Long Arc Quad  - 1-2 x daily - 7 x weekly - 2 sets - 10 reps  - Seated Gluteal Sets  - 1-2 x daily - 7 x weekly - 2 sets - 10 reps  - seated clamshells  - 1-2 x daily - 7 x weekly - 2 sets - 10 reps   Timed Minutes      Total Timed Treatment:     40   mins  Total Time of Visit:             45   mins         ASSESSMENT/PLAN     GOALS  Goals                                            Progress Note due by 10/27/2023                                                                Recert due by 11/29/2023   LTG by: 12 weeks Comments Date Status   Patient will report compliance with HEP.  Bolstered today  10/5 ongoing   Pt will be able to maintain static standing w/ 50/50 Wbing for >/=3 mins w/o UE support/a to improve capacity for ADL/IADLs 60# RLE, 150# LLE for 51.88 secs 9/28  3:51 min decreased WB R LE 10/3/23 10/3 ongoing   Patient to perform TUG in </=2 mins w/ FWW without LOB for improved capacity for household ambulation. 1:13.09 10/10 ongoing   Pt will be able to perform 5x STS w/ 50/50 Wbing in </=2 mins for improved functional strength 37.95 secs but asymmetrical, ~60# at most on RLE 9/28 ongoing   Improve R ankle DF to neutral 3 deg from neutral PROM, no change 9/28 ongoing    Improve gross B LE strength to >/=4/5 See MMT grid 9/28  ongoing     Assessment/Plan     ASSESSMENT: Patient presents today with OT reporting pt reported difficulty with bed t/f at home, whereas in the past she has not had difficulty. It was discovered, her difficulty is exacerbated by soft surface of her bed as she was able to perform t/f on mat table in clinic. With use of sheet for leg  and step for elevating LE, pt able to independently perform sitting <> supine t/f. Progressed with core and LE strengthening to assist with independence with these t/fs and also with hip stretches to assist  with donning shoes.     PLAN: Continue challenging core strength and also LE strength. Also address hip mobility to assist with donning shoes.     SIGNATURE: Maria R Garay PTA, [place] License #: [id number]  Electronically Signed on 10/12/2023        [address] [facility] [place] [address]  [phone]

## 2023-10-12 NOTE — PROGRESS NOTES
Occupational Therapy 30 day Progress Note   [address] [place] [address]    Patient: Tawny Shin   : 1955  Referring practitioner: Moises Oliveira MD  Date of Initial Visit: 10/12/2023  Today's Date: 10/12/2023  Patient seen for 13 sessions    Visit Diagnoses:    ICD-10-CM ICD-9-CM   1. Functional neurological symptom disorder with weakness or paralysis  F44.4 300.11   2. Decreased activities of daily living (ADL)  Z78.9 V49.89   3. Lower paraplegia  G82.20 344.1       SUBJECTIVE     SUBJECTIVE:   Pt reports no pain today. Pt reports she has been outside quite a bit this week. Pt reports   She went grocery shopping this morning with her caregiver.     PAIN:   Pt reports no 0/10 pain today.     Outcome Measure:   9 Hole Peg Test: Left: 32.21s  Right: 33.05 s    Modified Barthel Index: 55/100, 45% impaired with self-care. Improved.     PT G-Codes  Outcome Measure Options: (P) Quick DASH  Quick DASH Score: (P) 25% impaired with UE use. Improved.     OBJECTIVE      Objective          Strength/Myotome Testing     Left Wrist/Hand      (2nd hand position)     Trial 1: 15 lbs    Trial 2: 13 lbs    Trial 3: 13 lbs    Average: 13.67 lbs    Right Wrist/Hand      (2nd hand position)     Trial 1: 23 lbs    Trial 2: 23 lbs    Trial 3: 21 lbs    Average: 22.33 lbs    Additional Strength Details        Ambulation     Comments   Has been walking short distances with PT with use of FWW. CGA for most ADL transfers with use of FWW.     Functional Assessment     Comments  ADL  Dressing: Independent UB. Mod A for LB. Improving.  Bathing: CGA for transfer. Completes bathing independently. Improving.   Toileting: CGA for transfer. Min A for clothing. Mod I for hygiene.   Grooming: Independent   Feeding: Independent     IADL  Pt completes light cooking and shopping at w/c level. She has continued assisting with household cleaning tasks from the w/c  level.        General Comments     Shoulder Comments   B UE AROM is WFL.   5/5 strength at B UE. Improved.        Vision assessment: normal and wears corrected lenses/contact for reading.    Cognitive status: oriented to Person, Place, Time, and Situation    Sensation: Reports tingling in toes. No other deficits noted.    Tone: Grossly Normal at B UE.     Midline orientation: Midline    Fine Motor:   Unable to oppose L small finger. Reports hx of arthritis affecting the L small and ring fingers.     Wheelchair Training:  Independent with power w/c mobility.     Therapy Education/Self Care 79020   Education offered today Encouraged pt to continue daily performance of HEP and continue increasing IADL participation at home.    [employer] Code    Ongoing HEP   W/C push ups and modified w/c sit ups for core strengthening and to improve transfers.  BUE strengthening program    Timed Minutes      Therapeutic Exercises    17945 Units Comments   All ADL, ROM, MMT,  strength, and outcome measures addressed for PN. See details above.           Timed Minutes 45     Total Timed Treatment:     45  mins  Total Time of Visit:            45   mins    ASSESSMENT/PLAN     GOALS:  Goals                                          Progress Note due by 11/14/23                                                      Recert due by 11/14/23   STG by: 11/14/23 Comments Date Status   Pt will be independent with daily completion of a HEP to address B UE and core strength deficits.   Pt reports HEP performance daily. Pt has purchased some home strengthening equipment for B UE and .     Ongoing    Pt will complete all aspects of LB dressing and toileting with Mod I.  Still requiring modA from caregivers for LB dressing. Requires Jose R for clothing management during toileting.     Progressing   Pt will increase B UE strength to 5/5 at all joints to increase ADL and IADL independence.  B UE 5/5 across all joints. Improved.  10/12 Met          LTG  by: 11/14/23      Pt will complete all functional ADL transfers with Mod I and good safety.  CGA for functional ADL transfers using a FWW.   Progressing    Pt will complete simulated household IADL tasks in sitting or standing when possible displaying Mod I and good safety.  Pt CGA for standing IADL tasks, Denis for IADL tasks performed from her w/c. Pt requires some cues for safety during standing IADL tasks.   Progressing    Pt will increase B hand  strength to 30# for improved performance with ADL and IADL tasks.   Pt's  strength scores improved since last PN.   R: 22.33 lbs  L: 13.67 lbs   Progressing                Assessment & Plan       Assessment  Impairments: abnormal coordination, abnormal or restricted ROM, activity intolerance, impaired balance, impaired physical strength, lacks appropriate home exercise program, pain with function, safety issue and weight-bearing intolerance   Functional limitations: carrying objects, lifting, walking, pulling, pushing, uncomfortable because of pain, moving in bed, standing, stooping and reaching overhead   Assessment details: This pt still has 24 hr caregivers to assist her with ADL/IADL performance, but states that she is beginning to rely less on them. She has made improvements in B UE strength and met one goal today regarding strength. Pt encouraged to continue B UE strengthening exercises at home to maintain her UE strength. Pt has continued to make improvements in  strength and FMC. Pt continues to improve her performance on functional ADL transfers, still requiring CGA and use of FWW. Pt reports she has started having trouble with bringing B LE up when performing sit<>supine EOB. OT will continue to practice functional ADL transfers in future sessions. She continues to show good rehab potential to increase independence with daily tasks and decrease the need for caregivers with ADL. OT will continue to focus on these areas to allow her to reach her max  level of function.   Prognosis: good    Plan  Planned therapy interventions: IADL retraining, therapeutic activities, ADL retraining, balance/weight-bearing training, body mechanics training, fine motor coordination training, functional ROM exercises, home exercise program, manual therapy, motor coordination training, neuromuscular re-education, strengthening, abdominal trunk stabilization, flexibility, stretching and transfer training  Frequency: 2x week  Duration in weeks: 12  Treatment plan discussed with: patient  Plan details: Will continue to address deficits in UE strength, coordination, balance and functional mobility to improve performance of ADL and IADL tasks.       Signature:  Denise Flynn OT Student     Electronically signed on 10/12/2023    The clinical instructor and/or supervising staff, TIFFANY Padilla, was present in clinic guiding the visit by approving, concurring, and confirming the skilled judgement for all services rendered.    Signature:  TIFFANY Padilla [place] License #: [id number]  Electronically signed on 10/12/2023         [place] [address]  [phone]

## 2023-10-19 ENCOUNTER — TREATMENT (OUTPATIENT)
Dept: PHYSICAL THERAPY | Facility: CLINIC | Age: 68
End: 2023-10-19
Payer: MEDICARE

## 2023-10-19 DIAGNOSIS — F44.4 FUNCTIONAL NEUROLOGICAL SYMPTOM DISORDER WITH WEAKNESS OR PARALYSIS: Primary | ICD-10-CM

## 2023-10-19 DIAGNOSIS — G82.20 LOWER PARAPLEGIA: ICD-10-CM

## 2023-10-19 DIAGNOSIS — Z78.9 DECREASED ACTIVITIES OF DAILY LIVING (ADL): ICD-10-CM

## 2023-10-19 NOTE — PROGRESS NOTES
Occupational Therapy Treatment Note  [address] [place] [address]    Patient: Tawny Shin                                                 Visit Date: 10/19/2023  :     1955    Referring practitioner:    Moises Oliveira MD  Date of Initial Visit:          Type: THERAPY  Noted: 2023    Patient seen for 14 sessions    Visit Diagnoses:    ICD-10-CM ICD-9-CM   1. Functional neurological symptom disorder with weakness or paralysis  F44.4 300.11   2. Decreased activities of daily living (ADL)  Z78.9 V49.89   3. Lower paraplegia  G82.20 344.1     SUBJECTIVE     Subjective:  Pt reports she has been sick this week and that is why she missed her Tuesday appointment. She   States that other than being sick she has had a busy week. She and her caregiver have been going   To the park everyday to get some outside time in. She also reports that she was able to transfer to   The toilet and pull her pants down independently this week. She reports continued need for assist to  Pull pants back up after toileting.     PAIN: 0/10 in L elbow and hand         OBJECTIVE     Objective     Therapeutic Activities    29452 Comments   Completed w/c<>mat transfer using FWW with CGA. Practiced bed mobility sit<>supine x2 with use of leg  and Jose R.     Completed dynamic reaching activity using 4 tall and 4 short cones on the floor with min difficulty from sitting EOM using B UE for reaching.         Timed Minutes 15     Therapeutic Exercises    99754 Units Comments   Completed B  strengthening using 5# digi-flex performing whole hand power grasp and isolated finger flexion for 20 reps x1.           Timed Minutes 15     Self-Care/IADL Training    75278 Comments   Pt practiced LB dressing skills. Pt donned/doffed B shoes and socks independently using modified technique.         Timed Minutes 15     Total Timed Treatment:     45  mins  Total Time of  Visit:             45   mins       Therapy Education/Self Care 31758   Education offered today Practiced donning/doffing shoes and socks with modified technique. Encouraged pt to start performing this task independently at home.    Rakesh Code    Ongoing HEP   BUE strengthening program   Practicing ADL and IADL tasks with decreasing assist from caregivers.    Timed Minutes        ASSESSMENT/PLAN     GOALS:  Goals                                          Progress Note due by 11/14/23                                                      Recert due by 11/14/23   STG by: 11/14/23 Comments Date Status   Pt will be independent with daily completion of a HEP to address B UE and core strength deficits.   Pt reports HEP performance daily.   Ongoing    Pt will complete all aspects of LB dressing and toileting with Mod I.  Independent with LB dressing using modified techniques.   Progressing   Pt will increase B UE strength to 5/5 at all joints to increase ADL and IADL independence.   10/12 Met          LTG by: 11/14/23      Pt will complete all functional ADL transfers with Mod I and good safety.  Completed w/c<>mat table transfer with use of FWW and CGA. Practiced bed mobility of sit<>supine with use of leg  and min A.   Progressing    Pt will complete simulated household IADL tasks in sitting or standing when possible displaying Mod I and good safety.  Addressed dynamic reaching to the floor from sitting, was able to perform 8x independently.   Progressing    Pt will increase B hand  strength to 30# for improved performance with ADL and IADL tasks.   Completed B  strengthening with use of 5# digi flex.   Progressing             Assessment/Plan     ASSESSMENT:   Pt continues to demonstrate improvements in B  strength and LB dressing skills. Pt was able   To abilio/doff B socks and shoes today independently using modified techniques. Pt demonstrates   Increased flexibility in B hip flexors while performing  LB ADLs.     PLAN:   Will continue to address deficits in ADL/IADL performance and B  strength.       Signature:  Denise Flynn, OT Student  Electronically signed on 10/19/2023     The clinical instructor and/or supervising staff, TIFFANY Padilla, was present in clinic guiding the visit by approving, concurring, and confirming the skilled judgement for all services rendered.    Signature:  TIFFANY Padilla, [place] License #: [id number]  Electronically signed on 10/19/2023         [address] [place] [address], [place] [address]  [phone]

## 2023-10-19 NOTE — PROGRESS NOTES
Physical Therapy Treatment Note  [address] [place] [address]    Patient: Tawny Shin                                                 Visit Date: 10/19/2023  :     1955    Referring practitioner:    Moises Oliveira MD  Date of Initial Visit:          Type: THERAPY  Noted: 2023    Patient seen for 32 sessions    Visit Diagnoses:    ICD-10-CM ICD-9-CM   1. Functional neurological symptom disorder with weakness or paralysis  F44.4 300.11   2. Lower paraplegia  G82.20 344.1     SUBJECTIVE     Subjective:She denies pain and dizziness. Has only had dizziness once sine the maneuver here      PAIN: 0/10         OBJECTIVE     Objective       Therapeutic Exercises    91208 Units Comments   B pec and thoracic stretches with 1/2 roller in sitting     TKE stretches with pink bolster     DF stretches with pink bolster     B unilateral hip ER stretches with intermittent inferior lateral glides     Walking with Rwx 16 ft     B unilateral LE Shuttle Press with 4 bands x 6 min each          Timed Minutes 45      Therapy Education/Self Care [id number]   Education offered today    [facility] Code [id number]    Ongoing HEP   Date: 10/12/2023  Prepared by: Maria R Hicks     Bed:  - Prone Knee Extension with Ankle Weight  - 2-3 x daily - 7 x weekly - 30-60 seconds hold  - Supine Bridge  - 2 x daily - 7 x weekly - 2 sets - 10 reps  - Supine March  - 1-2 x daily - 7 x weekly - 2 sets - 10 reps  - Supine Active Straight Leg Raise  - 1-2 x daily - 7 x weekly - 2 sets - 10 reps     Chair  - Seated Hamstring Stretch with Chair  - 1-2 x daily - 7 x weekly - 2 sets - 10 reps  - Belt Assisted Dorsiflexion   - 1-2 x daily - 7 x weekly - 2 sets - 10 reps  - Standing Bilateral Gastroc Stretch with Step  - 1-2 x daily - 7 x weekly - 2 sets - 10 reps  - Seated Marching with Opposite Shoulder Flexion  - 1-2 x daily - 7 x weekly - 2 sets - 10 reps  - Seated  Long Arc Quad  - 1-2 x daily - 7 x weekly - 2 sets - 10 reps  - Seated Gluteal Sets  - 1-2 x daily - 7 x weekly - 2 sets - 10 reps  - seated clamshells  - 1-2 x daily - 7 x weekly - 2 sets - 10 reps   Timed Minutes        Total Timed Treatment:     45   mins  Total Time of Visit:             45   mins         ASSESSMENT/PLAN     GOALS  Goals                                            Progress Note due by 10/27/2023                                                                Recert due by 11/29/2023   LTG by: 12 weeks Comments Date Status   Patient will report compliance with HEP.  reinforced today  10/19 ongoing   Pt will be able to maintain static standing w/ 50/50 Wbing for >/=3 mins w/o UE support/a to improve capacity for ADL/IADLs 60# RLE, 150# LLE for 51.88 secs 9/28  3:51 min decreased WB R LE 10/3/23 10/3 ongoing   Patient to perform TUG in </=2 mins w/ FWW without LOB for improved capacity for household ambulation. 1:13.09 10/10 ongoing   Pt will be able to perform 5x STS w/ 50/50 Wbing in </=2 mins for improved functional strength 37.95 secs but asymmetrical, ~60# at most on RLE 9/28 ongoing   Improve R ankle DF to neutral 3 deg from neutral PROM, no change 9/28 ongoing    Improve gross B LE strength to >/=4/5 See MMT grid 9/28  ongoing       Assessment/Plan     ASSESSMENT:   She was able to walk the longest distance to date during the session today which is impressive.    PLAN:   Continue working on her B hip ER to make It easier for her to progress with OT donning/doffing shoes and socks.     SIGNATURE: Kentrell Lamb PTA, [place] License #: [id number]  Electronically Signed on 10/19/2023        Marj Guzman  [place] [address]  [phone]

## 2023-10-21 DIAGNOSIS — I10 ESSENTIAL HYPERTENSION: Chronic | ICD-10-CM

## 2023-10-23 RX ORDER — LOSARTAN POTASSIUM 50 MG/1
50 TABLET ORAL DAILY
Qty: 90 TABLET | Refills: 3 | Status: SHIPPED | OUTPATIENT
Start: 2023-10-23

## 2023-10-24 ENCOUNTER — TREATMENT (OUTPATIENT)
Dept: PHYSICAL THERAPY | Facility: CLINIC | Age: 68
End: 2023-10-24
Payer: MEDICARE

## 2023-10-24 DIAGNOSIS — Z78.9 DECREASED ACTIVITIES OF DAILY LIVING (ADL): ICD-10-CM

## 2023-10-24 DIAGNOSIS — F44.4 FUNCTIONAL NEUROLOGICAL SYMPTOM DISORDER WITH WEAKNESS OR PARALYSIS: ICD-10-CM

## 2023-10-24 DIAGNOSIS — G82.20 LOWER PARAPLEGIA: Primary | ICD-10-CM

## 2023-10-24 PROCEDURE — 97530 THERAPEUTIC ACTIVITIES: CPT

## 2023-10-24 PROCEDURE — 97535 SELF CARE MNGMENT TRAINING: CPT

## 2023-10-24 NOTE — PROGRESS NOTES
Physical Therapy Treatment Note  [address] [place] [address]    Patient: Tawny Shin                                                 Visit Date: 10/24/2023  :     1955    Referring practitioner:    Moises Oliveira MD  Date of Initial Visit:          Type: THERAPY  Noted: 2023    Patient seen for 33 sessions    Visit Diagnoses:    ICD-10-CM ICD-9-CM   1. Lower paraplegia  G82.20 344.1   2. Functional neurological symptom disorder with weakness or paralysis  F44.4 300.11     SUBJECTIVE     Subjective: No new c/c.    PAIN: 0/10     OBJECTIVE     Objective     Therapeutic Exercises    78886 Units Comments   Walking with FWW (neuro table <> shuttle press)     B unilateral LE Shuttle Press 2 x 10 ea 4 cords   B LE shuttle press  2 x 10  6 cords    passive B hip IR/ER stretches     B hip IR/ER PROM  (IR) L 42 deg R 26 deg  (ER) L 36 deg R 45 deg   Bridges  x5-6         Timed Minutes 25     Manual Therapy     84370  Comments   B lateral hip mobilization  Supine, sustained                   Timed Minutes 15     Therapeutic Activities    39178 Comments   W/C > tx table t/f with FWW CGA   Bed mobility supervision               Timed Minutes 5     Therapy Education/Self Care [id number]   Education offered today    [facility] Code [id number]    Ongoing HEP   Date: 10/12/2023  Prepared by: Maria R Hicks     Bed:  - Prone Knee Extension with Ankle Weight  - 2-3 x daily - 7 x weekly - 30-60 seconds hold  - Supine Bridge  - 2 x daily - 7 x weekly - 2 sets - 10 reps  - Supine March  - 1-2 x daily - 7 x weekly - 2 sets - 10 reps  - Supine Active Straight Leg Raise  - 1-2 x daily - 7 x weekly - 2 sets - 10 reps     Chair  - Seated Hamstring Stretch with Chair  - 1-2 x daily - 7 x weekly - 2 sets - 10 reps  - Belt Assisted Dorsiflexion   - 1-2 x daily - 7 x weekly - 2 sets - 10 reps  - Standing Bilateral Gastroc Stretch with Step  - 1-2 x  daily - 7 x weekly - 2 sets - 10 reps  - Seated Marching with Opposite Shoulder Flexion  - 1-2 x daily - 7 x weekly - 2 sets - 10 reps  - Seated Long Arc Quad  - 1-2 x daily - 7 x weekly - 2 sets - 10 reps  - Seated Gluteal Sets  - 1-2 x daily - 7 x weekly - 2 sets - 10 reps  - seated clamshells  - 1-2 x daily - 7 x weekly - 2 sets - 10 reps   Timed Minutes      Total Timed Treatment:     45   mins  Total Time of Visit:             45   mins         ASSESSMENT/PLAN     GOALS  Goals                                            Progress Note due by 10/27/2023                                                                Recert due by 11/29/2023   LTG by: 12 weeks Comments Date Status   Patient will report compliance with HEP.  reinforced today  10/19 ongoing   Pt will be able to maintain static standing w/ 50/50 Wbing for >/=3 mins w/o UE support/a to improve capacity for ADL/IADLs 60# RLE, 150# LLE for 51.88 secs 9/28  3:51 min decreased WB R LE 10/3/23 10/3 ongoing   Patient to perform TUG in </=2 mins w/ FWW without LOB for improved capacity for household ambulation. 1:13.09 10/10 ongoing   Pt will be able to perform 5x STS w/ 50/50 Wbing in </=2 mins for improved functional strength 37.95 secs but asymmetrical, ~60# at most on RLE 9/28 ongoing   Improve R ankle DF to neutral 3 deg from neutral PROM, no change  9/28 ongoing    Improve gross B LE strength to >/=4/5 See MMT grid 9/28  ongoing     Assessment/Plan     ASSESSMENT: Pt demonstrated R hip ER WNL and improving L hip ER PROM. B hip IR PROM remains limited. Primarily though, she demonstrates significant restrictions in B ankles and decreased WS onto R LE which limits step height on L, thus affecting gait mechanics and often results in knee buckling (R > L).     PLAN: Address all goals for progress note. Consider prioritizing ankle mobility and B lateral WS to improve gait.     SIGNATURE: Maria R Garay, PTA, [place] License #: [id number]  Electronically Signed on  10/24/2023        [address] [place] [address]. [address]  [phone]

## 2023-10-24 NOTE — PROGRESS NOTES
Occupational Therapy Treatment Note  [address] [place] [address]    Patient: Tawny Shin                                                 Visit Date: 10/24/2023  :     1955    Referring practitioner:    Moises Oliveira MD  Date of Initial Visit:          Type: THERAPY  Noted: 2023    Patient seen for 15 sessions    Visit Diagnoses:    ICD-10-CM ICD-9-CM   1. Lower paraplegia  G82.20 344.1   2. Functional neurological symptom disorder with weakness or paralysis  F44.4 300.11   3. Decreased activities of daily living (ADL)  Z78.9 V49.89     SUBJECTIVE     Subjective:  Pt reports she has had a rough week and has not been doing well. Pt reports she found out some   Sad news but is working through it. Pt reports that donning pants is still difficult and would like to   Practice this some more.     PAIN: 0/10 in L elbow and hand       OBJECTIVE     Objective     Therapeutic Activities    41287 Comments   Remainder of BITS activities focused on B  UE AROM and coordination. Accuracy ranged from 75 to 100%. Reaction time was 1.44 to 2.01 seconds. Focusing on dynamic reaching in B UE for IADL performance.     Pt completed 2 BITS assessments using her R UE, focusing on control during dynamic reaching. Trail Making Part A completed with the R UE. 0 errors and a time of 1:14. Part B completed with 0 errors and a time of 2:15.        Timed Minutes 30     Self-Care/IADL Training    27565 Comments   Pt doffed/donned B shoes with min difficulty for R shoe. Pt demonstrated improvements in B hip ROM for LB ADLs.     Practiced bed mobility and bed transfers to increase independence at home during the night. Pt performed sit<>supine independently on mat table, sit<>stand from mat table and mat-w/c transfer with FWW.    Timed Minutes 15     Total Timed Treatment:     45  mins  Total Time of Visit:             45   mins       Therapy Education/Self  Care 98654   Education offered today Discussed plans to practice donning pants next session. Pt plans to bring a pair of sweatpants to practice with.    Rakesh Code    Ongoing HEP   BUE strengthening program   Practicing ADL and IADL tasks with decreasing assist from caregivers.    Timed Minutes        ASSESSMENT/PLAN     GOALS:  Goals                                          Progress Note due by 11/14/23                                                      Recert due by 11/14/23   STG by: 11/14/23 Comments Date Status   Pt will be independent with daily completion of a HEP to address B UE and core strength deficits.   Pt reports HEP performance daily.   Ongoing    Pt will complete all aspects of LB dressing and toileting with Mod I.  Able to complete doffing/donning shoes independent with increased time.   Progressing   Pt will increase B UE strength to 5/5 at all joints to increase ADL and IADL independence.   10/12 Met          LTG by: 11/14/23      Pt will complete all functional ADL transfers with Mod I and good safety.  Practiced bed mobility and transfers to improve independence at night time at home.   Progressing    Pt will complete simulated household IADL tasks in sitting or standing when possible displaying Mod I and good safety.  Completed BITS activities focusing on dynamic reaching in B UE for IADL tasks.  Progressing    Pt will increase B hand  strength to 30# for improved performance with ADL and IADL tasks.   Pt continues to perform  strengthening at home with digi-flex daily.   Progressing             Assessment/Plan     ASSESSMENT:   Pt continues demonstrate improvements in B UE dynamic reaching. Pt reports she is making   Progress at home with performing ADLs and IADLs with increased independence. Pt continues   To demonstrate improvements in doffing/donning B shoes. Pt displayed improved hip mobility   During LB ADL practice.     PLAN:   Will continue to address deficits in LB  dressing and LB ADL performance.       Signature:  Denise Flynn, OT Student  Electronically signed on 10/24/2023     The clinical instructor and/or supervising staff, TIFFANY Padilla, was present in clinic guiding the visit by approving, concurring, and confirming the skilled judgement for all services rendered.    Signature:  TIFFANY Padilla, [place] License #: [id number]  Electronically signed on 10/24/2023         [address] [place] [address] [place] [address]  [phone]

## 2023-10-31 ENCOUNTER — TREATMENT (OUTPATIENT)
Dept: PHYSICAL THERAPY | Facility: CLINIC | Age: 68
End: 2023-10-31
Payer: MEDICARE

## 2023-10-31 DIAGNOSIS — Z78.9 DECREASED ACTIVITIES OF DAILY LIVING (ADL): ICD-10-CM

## 2023-10-31 DIAGNOSIS — G82.20 LOWER PARAPLEGIA: Primary | ICD-10-CM

## 2023-10-31 DIAGNOSIS — F44.4 FUNCTIONAL NEUROLOGICAL SYMPTOM DISORDER WITH WEAKNESS OR PARALYSIS: ICD-10-CM

## 2023-10-31 NOTE — PROGRESS NOTES
"Chief Complaint  Urinary Incontinence - \"I do not want a catheter\"    Subjective          Tawny Shin presents to [facility]   Patient neurogenic bladder.  Etiology is a spinal cord injury from fall.  Biggest issue is urgency and urge incontinence.  Had a long-term indwelling catheter during rehab.  She has he voids with a good stream.  Most concerned about nocturnal enuresis.  Caffeine intake not excessive.          Current Outpatient Medications:     acetaminophen (TYLENOL) 325 MG tablet, Take 2 tablets by mouth Every 6 (Six) Hours As Needed (mild pain). Indications: Fever, Pain, Disp: , Rfl:     apixaban (ELIQUIS) 5 MG tablet tablet, Take 1 tablet by mouth 2 (Two) Times a Day. Indications: Other - full anticoagulation, Disp: 180 tablet, Rfl: 3    aspirin 81 MG EC tablet, Take 1 tablet by mouth Daily. Indications: Disease involving Lipid Deposits in the Arteries, Disp: , Rfl:     carvedilol (COREG) 25 MG tablet, Take 1 tablet by mouth 2 (Two) Times a Day With Meals. Indications: Heart Attack, Disp: 180 tablet, Rfl: 3    cephalexin (KEFLEX) 500 MG capsule, Take 2 capsules by mouth 2 (Two) Times a Day. Indications: Infection of the Skin and/or Soft Tissue, Disp: 120 capsule, Rfl: 3    Diclofenac Sodium (VOLTAREN) 1 % gel gel, Apply 4 g topically to the appropriate area as directed 4 (Four) Times a Day As Needed. Indications: Joint Damage causing Pain and Loss of Function, Disp: , Rfl:     DULoxetine (CYMBALTA) 60 MG capsule, Take 1 capsule by mouth Daily. Indications: Major Depressive Disorder, Disp: 90 capsule, Rfl: 3    famotidine (PEPCID) 40 MG tablet, Take 1 tablet by mouth Daily. Indications: Heartburn, Disp: 90 tablet, Rfl: 3    ferrous sulfate 324 (65 Fe) MG tablet delayed-release EC tablet, Take 1 tablet by mouth Daily With Breakfast. Indications: Iron Deficiency, Disp: 90 tablet, Rfl: 3    fluticasone (FLONASE) 50 MCG/ACT nasal spray, 1 spray into the nostril(s) as directed by " provider Daily., Disp: 18.2 mL, Rfl: 5    folic acid (FOLVITE) 1 MG tablet, Take 1 tablet by mouth Daily. Indications: Anemia From Inadequate Folic Acid, Disp: 90 tablet, Rfl: 3    furosemide (LASIX) 40 MG tablet, Take 1 tablet by mouth Daily. Indications: Edema, Disp: 90 tablet, Rfl: 3    isosorbide mononitrate (IMDUR) 60 MG 24 hr tablet, Take 1 tablet by mouth Every Morning. Indications: Stable Angina Pectoris, Disp: 90 tablet, Rfl: 3    leflunomide (ARAVA) 20 MG tablet, Take 1 tablet by mouth every night at bedtime. Indications: Rheumatoid Arthritis, Disp: 90 tablet, Rfl: 3    levothyroxine (SYNTHROID, LEVOTHROID) 75 MCG tablet, Take 1 tablet by mouth Daily. Indications: Underactive Thyroid, Disp: 90 tablet, Rfl: 3    Lidocaine 4 % patch, Apply  topically every night at bedtime. To lower back  Indications: Arthritis Related to an Inflammatory Disorder, Disp: , Rfl:     losartan (COZAAR) 50 MG tablet, TAKE 1 TABLET BY MOUTH DAILY, Disp: 90 tablet, Rfl: 3    magnesium hydroxide (MILK OF MAGNESIA) 400 MG/5ML suspension, Take 30 mL by mouth Daily As Needed for Constipation., Disp: , Rfl:     Menthol-Zinc Oxide 0.45-20 % ointment, Apply 1 application  topically 2 (Two) Times a Day. Apply to right buttock, Disp: , Rfl:     multivitamin with minerals tablet tablet, Take 1 tablet by mouth Daily., Disp:  , Rfl:     nitroglycerin (Nitrostat) 0.4 MG SL tablet, Place 1 tablet under the tongue Every 5 (Five) Minutes As Needed for Chest Pain. Take no more than 3 doses in 15 minutes., Disp: , Rfl: 12    polyethylene glycol (MIRALAX) 17 GM/SCOOP powder, Take 17 g by mouth Daily., Disp: , Rfl:     predniSONE (DELTASONE) 5 MG tablet, Take 1 tablet by mouth Daily. Indications: Acute Bursitis (Patient taking differently: Take 1 mg by mouth Daily. Indications: Acute Bursitis), Disp: 90 tablet, Rfl: 3    salsalate (DISALCID) 500 MG tablet, 5 PILLS ON M-W-F-SUNDAY 4 PILLS ON T-TH-SAT  Indications: Rheumatoid Arthritis, Disp: 384  tablet, Rfl: 3    sucralfate (CARAFATE) 1 g tablet, Take 1 tablet by mouth 4 (Four) Times a Day Before Meals & at Bedtime. Indications: Peptic Ulcer, Disp: 360 tablet, Rfl: 3    TiZANidine (Zanaflex) 2 MG capsule, Take 1 capsule by mouth 2 (Two) Times a Day As Needed for Muscle Spasms., Disp: 20 capsule, Rfl: 0    traMADol (ULTRAM) 50 MG tablet, Take 1 tablet by mouth Every 6 (Six) Hours As Needed for Moderate Pain., Disp: , Rfl:     valACYclovir (VALTREX) 500 MG tablet, Take 1 tablet by mouth Daily. Indications: Shingles, Disp: 90 tablet, Rfl: 3    Vibegron 75 MG tablet, Take 1 tablet by mouth Daily., Disp: 30 tablet, Rfl: 11  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    Elevated cholesterol     Eustachian tube dysfunction     Heart disease     Herpes simplex     History of transfusion     Hyperlipidemia     Hypertension     Hypothyroidism 12/23/2020    Intrinsic asthma 12/23/2020    Knee dislocation     Labral tear of right hip joint     Laryngitis sicca     Laryngitis, chronic     Left carotid bruit 03/09/2016    MI (myocardial infarction)     Myalgia due to statin 06/25/2019    Open wound of right hip 09/14/2021    Osteomyelitis of right femur 07/06/2021    Otorrhea     Pacemaker 11/17/2016    Primary osteoarthritis of left knee 12/23/2020    Psoriasis vulgaris 12/23/2020    S/P coronary artery stent placement 03/09/2016    Sensorineural hearing loss     Seropositive rheumatoid arthritis of  "multiple sites 12/27/2019    Overview:  -myochrysine '93-'96 -methotrexate '96--->11/98;r/s  restarted 2/99--> 8/14 (anemia) -sulfasalazine- not effective -penicillamine 6/98-->10/98; no effect -leflunomide 11/98--> - Humira '13-->didn't take - Enbrel 12/14-->3/15- no effect!   Last Assessment & Plan:  - \"aching all over\" because she had to be off her anti-rheumatic drugs for 2 weeks in preparation for her R knee surgery - he    Sick sinus syndrome 12/27/2019    Sjogren's disease     Spondylolisthesis of lumbar region 01/17/2018    Syncope, recurrent 02/08/2021    Urinary tract infection      Past Surgical History:   Procedure Laterality Date    A-V CARDIAC PACEMAKER INSERTION  2016    ATRIAL CARDIAC PACEMAKER INSERTION      CARDIAC CATHETERIZATION      CATARACT EXTRACTION      CERVICAL CORPECTOMY N/A 3/3/2021    Procedure: CERVICAL 6 CORPECTOMY WITH TITANIUM CAGE WITH NEURO MONITORING;  Surgeon: Bandar Shea MD;  Location:  [facility];  Service: Neurosurgery;  Laterality: N/A;    COLONOSCOPY  11/08/2011    One fold in the ascending colon which showed ulcer otherwise normal exam    COLONOSCOPY  11/12/2004    Normal exam repeat in five years    CORONARY ANGIOPLASTY WITH STENT PLACEMENT      X 2; 2013 & 2014    ENDOSCOPY  07/10/2014    Normal exam    FLAP LEG Right 9/14/2021    Procedure: RIGHT GLUTEAL FASCIOCUTANEOUS ADVANCEMENT FLAP AND RIGHT TENSOR FASCIAL JESSICA FLAP;  Surgeon: Amadeo Turner MD;  Location:  [facility];  Service: Plastics;  Laterality: Right;    HIP ABDUCTION TENOTOMY BILATERAL Right 1/14/2021    Procedure: RIGHT HIP GLUTEUS MEDLUS / MINIMUS REPAIR, POSSIBLE ACHILLES ALLOGRAFT;  Surgeon: Nino Carlson MD;  Location:  [facility];  Service: Orthopedics;  Laterality: Right;    INCISION AND DRAINAGE ABSCESS Right 6/4/2022    Procedure: INCISION AND DRAINAGE ABSCESS right hip;  Surgeon: Magda Salcido MD;  Location:  [facility];  Service: General;  Laterality: Right;    INCISION AND DRAINAGE " ABSCESS Right 6/10/2022    Procedure: RIGHT HIP INCISION AND DRAINAGE. MD NEEDS 3L VANC IRRIGATION, CURRETTES, DAICANS, KERLEX ROLLS;  Surgeon: Amadoe Turner MD;  Location:  [facility];  Service: Plastics;  Laterality: Right;    INCISION AND DRAINAGE HIP Right 2/9/2021    Procedure: HIP INCISION AND DRAINAGE;  Surgeon: Nino Carlson MD;  Location:  [facility];  Service: Orthopedics;  Laterality: Right;    INCISION AND DRAINAGE LEG Right 10/24/2021    Procedure: INCISION AND DRAINAGE LOWER EXTREMITY;  Surgeon: Amadeo Turner MD;  Location:  [facility];  Service: Plastics;  Laterality: Right;    INCISION AND DRAINAGE OF WOUND Right 7/8/2021    Procedure: INCISION AND DRAINAGE WOUND RIGHT HIP;  Surgeon: James Huntley MD;  Location:  [facility];  Service: Orthopedics;  Laterality: Right;    JOINT REPLACEMENT      KYPHOPLASTY WITH BIOPSY Bilateral 10/26/2021    Procedure: THOARCIC 12 KYPHOPLASTY WITH BIOPSY;  Surgeon: Bandar Seha MD;  Location:  [facility];  Service: Neurosurgery;  Laterality: Bilateral;    LEG DEBRIDEMENT Right 9/14/2021    Procedure: DEBRIDEMENT OF RIGHT HIP WOUND, RIGHT GLUTEAL FASCIOCUTANEOUS ADVANCEMENT FLAP AND RIGHT TENSOR FASCIAL JESSICA FLAP;  Surgeon: Amadeo Turner MD;  Location:  [facility];  Service: Plastics;  Laterality: Right;    LUMBAR DISCECTOMY Right 3/23/2021    Procedure: LUMBAR DISCECTOMY MICRO, Lumbar 1/2 right;  Surgeon: Bandar Shea MD;  Location:  [facility];  Service: Neurosurgery;  Laterality: Right;    LUMBAR FUSION N/A 1/19/2018    Procedure: L3-4,L4-5 DECOMPRESSION, POSTERIOR SPINAL FUSION WITH INSTRUMENTATION;  Surgeon: Fortino Oropeza MD;  Location:  [facility];  Service:     LUMBAR LAMINECTOMY WITH FUSION Left 1/17/2018    Procedure: LEFT L3-4 L4-5 LATERAL LUMBAR INTERBODY FUSION;  Surgeon: Fortino Oropeza MD;  Location:  [facility];  Service:     MYRINGOTOMY W/ TUBES  09/04/2014    TUBES NO LONGER IN PLACE    OTHER SURGICAL HISTORY       "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 so hardware was removed and spacers were placed    REPLACEMENT TOTAL KNEE Right            Review of Systems      Objective   PHYSICAL EXAM  Vital Signs:   Temp 96.9 °F (36.1 °C)   Ht 170.2 cm (67\")   BMI 27.88 kg/m²     Physical Exam      DATA  Result Review :                                   ASSESSMENT AND PLAN          Problem List Items Addressed This Visit    None  Visit Diagnoses       Neurogenic bladder    -  Primary    Relevant Medications    Vibegron 75 MG tablet    Urge incontinence            Given detrusor instability she likely needs overactive bladder medication.  I prefer to stay away from anticholinergic medications unless we just have to do that due to her insurance coverage.  I gave her samples of Gemtesa.  I went over the risks and benefits of that medication.    FOLLOW UP     Return in about 3 months (around 2/7/2024) for Follow up with DANIELA Hawkins.        (Please note that portions of this note were completed with a voice recognition program.)  Ky Plascencia MD  11/09/23  06:17 CST  "

## 2023-10-31 NOTE — PROGRESS NOTES
Progress Note Addendum      Patient: Tawny hSin           : 1955  Visit Date: 10/31/2023  Referring practitioner: Moises Oliveira MD  Date of Initial Visit: Type: THERAPY  Noted: 2023  Patient seen for 34 sessions  Visit Diagnoses:    ICD-10-CM ICD-9-CM   1. Lower paraplegia  G82.20 344.1   2. Functional neurological symptom disorder with weakness or paralysis  F44.4 300.11          Clinical Progress: improved  Home Program Compliance: Yes  Progress toward previous goals: Partially Met  Prognosis to achieve goals: good    Objective   See PTA note for goals  Assessment & Plan       Assessment  Impairments: abnormal coordination, abnormal gait, abnormal muscle firing, abnormal muscle tone, abnormal or restricted ROM, activity intolerance, impaired balance, impaired physical strength, lacks appropriate home exercise program, pain with function, safety issue and weight-bearing intolerance   Functional limitations: carrying objects, lifting, sleeping, walking, pulling, pushing, uncomfortable because of pain, moving in bed, sitting, standing, stooping, reaching behind back, reaching overhead and unable to perform repetitive tasks   Prognosis: good    Plan  Therapy options: will be seen for skilled therapy services  Planned modality interventions: thermotherapy (hydrocollator packs), cryotherapy, low level laser therapy, TENS, dry needling, electrical stimulation/[place] stimulation and ultrasound  Planned therapy interventions: abdominal trunk stabilization, manual therapy, ADL retraining, motor coordination training, balance/weight-bearing training, neuromuscular re-education, body mechanics training, postural training, soft tissue mobilization, spinal/joint mobilization, fine motor coordination training, flexibility, functional ROM exercises, strengthening, gait training, stretching, home exercise program, therapeutic activities, IADL retraining, joint mobilization and transfer training  Frequency:  2x week  Duration in weeks: 12  Treatment plan discussed with: patient        I reviewed the treatment and goals with Maria R Garay PTA and agree with the POC.    SIGNATURE: Molly Garrett PT, License #: [id number]  Electronically Signed on 10/31/2023

## 2023-10-31 NOTE — PROGRESS NOTES
"                                                                Physical Therapy Treatment Note and 30 Day Progress Note  [address] [place] [address]    Patient: Tawny Shin                                                 Visit Date: 10/31/2023  :     1955    Referring practitioner:    Moises Oliveira MD  Date of Initial Visit:          Type: THERAPY  Noted: 2023    Patient seen for 34 sessions    Visit Diagnoses:    ICD-10-CM ICD-9-CM   1. Lower paraplegia  G82.20 344.1   2. Functional neurological symptom disorder with weakness or paralysis  F44.4 300.11     SUBJECTIVE     Subjective: She reports feeling 65% improved since evaluation. She reports increased strength in hands/shoulders. She reports, \"my legs are starting to get some muscles in 'em.\" Her core strength has also improved. She reports that she struggles with dressing and still isn't walking, which she would like to work towards. She denies falls or near falls.     PAIN: 0/10 > 1-2/10     OBJECTIVE     Objective     Therapeutic Exercises    96691 Units Comments   B hip MMT  Flex: 4/5, abd (R) 2+/5 (L) 3-/5, ext 3-/5   Standing for endurance     5x STS  See goals   R ankle DF PROM   See goals        Timed Minutes 25     Therapeutic Activities    67370 Comments   W/C > tx table t/f with FWW CGA   Bed mobility supervision   TUG See goals       Timed Minutes 10     Therapy Education/Self Care [id number]   Education offered today HEP, POC   [id number]    Ongoing HEP   Date: 10/12/2023  Prepared by: Maria R Hicks     Bed:  - Prone Knee Extension with Ankle Weight  - 2-3 x daily - 7 x weekly - 30-60 seconds hold  - Supine Bridge  - 2 x daily - 7 x weekly - 2 sets - 10 reps  - Supine March  - 1-2 x daily - 7 x weekly - 2 sets - 10 reps  - Supine Active Straight Leg Raise  - 1-2 x daily - 7 x weekly - 2 sets - 10 reps     Chair  - Seated Hamstring Stretch with Chair  - 1-2 x daily - 7 x weekly - 2 sets - 10 reps  - " Belt Assisted Dorsiflexion   - 1-2 x daily - 7 x weekly - 2 sets - 10 reps  - Standing Bilateral Gastroc Stretch with Step  - 1-2 x daily - 7 x weekly - 2 sets - 10 reps  - Seated Marching with Opposite Shoulder Flexion  - 1-2 x daily - 7 x weekly - 2 sets - 10 reps  - Seated Long Arc Quad  - 1-2 x daily - 7 x weekly - 2 sets - 10 reps  - Seated Gluteal Sets  - 1-2 x daily - 7 x weekly - 2 sets - 10 reps  - seated clamshells  - 1-2 x daily - 7 x weekly - 2 sets - 10 reps   Timed Minutes 10     Total Timed Treatment:     45   mins  Total Time of Visit:             45   mins         ASSESSMENT/PLAN     GOALS  Goals                                            Progress Note due by 11/30/2023                                                                Recert due by 11/29/2023   LTG by: 12 weeks Comments Date Status   Patient will report compliance with HEP.  She reports compliance 3-4x/week 10/31 ongoing   Pt will be able to maintain static standing w/ 50/50 Wbing for >/=3 mins w/o UE support/a to improve capacity for ADL/IADLs 60# RLE, 150# LLE for 51.88 secs 9/28  3:51 min decreased WB R LE 10/3/23  3:38 min decreased WB R LE 10/31/23   10/31 ongoing   Patient to perform TUG in </=2 mins w/ FWW without LOB for improved capacity for household ambulation. 1:13.09 on 10/10/23  6:15.68 min, 4 seated rest breaks 10/31 10/31 ongoing   Pt will be able to perform 5x STS w/ 50/50 Wbing in </=2 mins for improved functional strength 37.95 secs but asymmetrical, ~60# at most on RLE on 9/28/2023  23.25 sec with FWW (L > R LE) 10/31 10/31 Partially met;  ongoing   Improve R ankle DF to neutral 9/28/20023  3 deg from neutral PROM, no change   1 deg past neutral PROM  10/31 MET   Improve gross B LE strength to >/=4/5 Flex: 4/5, abd (R) 2+/5 (L) 3-/5, ext 3-/5 10/31  ongoing     Assessment/Plan     ASSESSMENT: Patient presents today reporting she feels about 65% improved since initial evaluation. She has met one and partially met one  of her six goals. She tends to demonstrate difficulty with endurance, as demonstrated by significant increase in TUG score which was performed today at end of session. She had stood for 3:38 min and performed all other components for the progress note prior to TUG today. We will plan to assess TUG score again at the beginning of next session to obtain a more accurate score as she was able to perform TUG in 1:13 min on 10/10/2023.     Though still quite limited, she has demonstrated significant improvements with hip and ankle mobility over the past few sessions which reflects in her ability to perform ADLs, such as donning clothing. She continues to require hip strengthening as well as gait training and progressing symmetrical WB through B LE. She would benefit from continued skilled PT to address these deficits.     PLAN: Consider prioritizing ankle mobility and B lateral WS to improve gait. May consider use of neurocom for assessing B WB.    **RE-ASSESS TUG SCORE AT THE BEGINNING OF THE SESSION**     SIGNATURE: Maria R Garay PTA, [place] License #: [id number]  Electronically Signed on 10/31/2023        [place] [address]  [phone]

## 2023-10-31 NOTE — PROGRESS NOTES
Occupational Therapy Treatment Note  [address] [place] [address]    Patient: Tawny Shin                                                 Visit Date: 10/31/2023  :     1955    Referring practitioner:    Moises Oliviera MD  Date of Initial Visit:          Type: THERAPY  Noted: 2023    Patient seen for 16 sessions    Visit Diagnoses:    ICD-10-CM ICD-9-CM   1. Lower paraplegia  G82.20 344.1   2. Functional neurological symptom disorder with weakness or paralysis  F44.4 300.11   3. Decreased activities of daily living (ADL)  Z78.9 V49.89     SUBJECTIVE     Subjective:  Pt reports she has been doing well the past week. Pt was very tired from physical therapy and had   Pain in B knees d/t completing a lot of standing and walking. Pt reports she forgot to bring her shorts   For LB dressing today, but she will bring them next session.     PAIN: 0/10 in L elbow and hand       OBJECTIVE     Objective     Therapeutic Exercises    58097 Units Comments   Completed B shoulder strengthening exercises using 3# free weights; completed shoulder flexion, shoulder abduction, internal/external rotation, bicep curls, and tricep curls 20 reps X1.   Completed sitting in w/c while hot pack was applied to B knees, d/t pain from PT.                        Timed Minutes 10     Therapeutic Activities    99439 Comments   Completed dynamic reaching activities from w/c level, focusing on B UE reaching for IADL performance. Pt was able to reach overhead and to the ground to  items of varying sizes. Pt reported no increase in pain with activity.     Pt demonstrated ability to reach B feet with improved hip flexion. Focusing on hip flexion for independence with LB dressing.         Timed Minutes 30     Total Timed Treatment:     40  mins  Total Time of Visit:             40   mins       Therapy Education/Self Care [id number]   Education offered today  Reviewed and practiced B shoulder exercises for continued performance at home.    [facility] Code    Ongoing HEP   BUE strengthening program   B  strengthening exercises with personal digi-flex   Practicing ADL and IADL tasks with decreasing assist from caregivers.    Timed Minutes        ASSESSMENT/PLAN     GOALS:  Goals                                          Progress Note due by 11/14/23                                                      Recert due by 11/14/23   STG by: 11/14/23 Comments Date Status   Pt will be independent with daily completion of a HEP to address B UE and core strength deficits.   Pt reports HEP performance daily.   Ongoing    Pt will complete all aspects of LB dressing and toileting with Mod I.  Discussed dressing and toileting techniques for managing LB clothing. Will practice next session.   Progressing   Pt will increase B UE strength to 5/5 at all joints to increase ADL and IADL independence.   10/12 Met          LTG by: 11/14/23      Pt will complete all functional ADL transfers with Mod I and good safety.  Not addressed d/t B knee pain from PT.   Progressing    Pt will complete simulated household IADL tasks in sitting or standing when possible displaying Mod I and good safety.  Addressed dynamic reaching while sitting in power w/c, for IADL.   Progressing    Pt will increase B hand  strength to 30# for improved performance with ADL and IADL tasks.   Pt reports continued use of digi-flex at home for  strengthening.   Progressing             Assessment/Plan     ASSESSMENT:   Pt continues demonstrate improvements in dynamic reaching from sitting in w/c for improved   Performance during IADL tasks. Pt reports continued use of digi-flex at home for B    Strengthening. Pt was very fatigued and in pain from PT appointment right before this session,   So activities were performed from sitting today.     PLAN:   Will continue to address deficits in LB dressing and LB ADL  performance.       Signature:  Denise Flynn OT Student  Electronically signed on 10/31/2023     The clinical instructor and/or supervising staff, TIFFANY Padilla, was present in clinic guiding the visit by approving, concurring, and confirming the skilled judgement for all services rendered.    Signature:  TIFFANY Padilla, [place] License #: [id number]  Electronically signed on 10/31/2023         [address] [place] [address] [place] [address]  [phone]

## 2023-11-02 ENCOUNTER — TREATMENT (OUTPATIENT)
Dept: PHYSICAL THERAPY | Facility: CLINIC | Age: 68
End: 2023-11-02
Payer: MEDICARE

## 2023-11-02 DIAGNOSIS — Z78.9 DECREASED ACTIVITIES OF DAILY LIVING (ADL): ICD-10-CM

## 2023-11-02 DIAGNOSIS — F44.4 FUNCTIONAL NEUROLOGICAL SYMPTOM DISORDER WITH WEAKNESS OR PARALYSIS: Primary | ICD-10-CM

## 2023-11-02 DIAGNOSIS — G82.20 LOWER PARAPLEGIA: ICD-10-CM

## 2023-11-02 DIAGNOSIS — G82.20 LOWER PARAPLEGIA: Primary | ICD-10-CM

## 2023-11-02 NOTE — PROGRESS NOTES
Physical Therapy Treatment Note  [address] [place] [address]    Patient: Tawny Shin                                                 Visit Date: 2023  :     1955    Referring practitioner:    Moises Oliveira MD  Date of Initial Visit:          Type: THERAPY  Noted: 2023    Patient seen for 35 sessions    Visit Diagnoses:    ICD-10-CM ICD-9-CM   1. Lower paraplegia  G82.20 344.1     SUBJECTIVE     Subjective:She reports she has been having pain in L knee and shoulder after Tuesday       PAIN: 3/10         OBJECTIVE     Objective     Therapeutic Activities    61125 Comments   Stand pivot with Rwx x3 (WC/mat table/Shuttle Press)    TUG trials x 2 1:37.92, 1:48.36               Timed Minutes 20      Therapeutic Exercises    11453 Units Comments   B unilateral hip ER stretches with intermittent inferior lateral glides      DF stretches with pink bolster      TKE stretches with pink bolster      B unilateral LE Shuttle Press with 4 bands x 5 min each           Timed Minutes 25        Therapy Education/Self Care 54554   Education offered today    [id number]     Ongoing HEP   Date: 10/12/2023  Prepared by: Maria R Hicks     Bed:  - Prone Knee Extension with Ankle Weight  - 2-3 x daily - 7 x weekly - 30-60 seconds hold  - Supine Bridge  - 2 x daily - 7 x weekly - 2 sets - 10 reps  - Supine March  - 1-2 x daily - 7 x weekly - 2 sets - 10 reps  - Supine Active Straight Leg Raise  - 1-2 x daily - 7 x weekly - 2 sets - 10 reps     Chair  - Seated Hamstring Stretch with Chair  - 1-2 x daily - 7 x weekly - 2 sets - 10 reps  - Belt Assisted Dorsiflexion   - 1-2 x daily - 7 x weekly - 2 sets - 10 reps  - Standing Bilateral Gastroc Stretch with Step  - 1-2 x daily - 7 x weekly - 2 sets - 10 reps  - Seated Marching with Opposite Shoulder Flexion  - 1-2 x daily - 7 x weekly - 2 sets - 10 reps  - Seated Long Arc  Quad  - 1-2 x daily - 7 x weekly - 2 sets - 10 reps  - Seated Gluteal Sets  - 1-2 x daily - 7 x weekly - 2 sets - 10 reps  - seated clamshells  - 1-2 x daily - 7 x weekly - 2 sets - 10 reps   Timed Minutes        Total Timed Treatment:     45   mins  Total Time of Visit:             45   mins         ASSESSMENT/PLAN     GOALS  Goals                                            Progress Note due by 11/29/2023                                                                Recert due by 11/29/2023   LTG by: 12 weeks Comments Date Status   Patient will report compliance with HEP.  She reports compliance 3-4x/week 10/31 ongoing   Pt will be able to maintain static standing w/ 50/50 Wbing for >/=3 mins w/o UE support/a to improve capacity for ADL/IADLs 60# RLE, 150# LLE for 51.88 secs 9/28  3:51 min decreased WB R LE 10/3/23  3:38 min decreased WB R LE 10/31/23    10/31 ongoing   Patient to perform TUG in </=2 mins w/ FWW without LOB for improved capacity for household ambulation. 1:13.09 on 10/10/23  6:15.68 min, 4 seated rest breaks 10/31  1:37.92 today 11/2 ongoing   Pt will be able to perform 5x STS w/ 50/50 Wbing in </=2 mins for improved functional strength 37.95 secs but asymmetrical, ~60# at most on RLE on 9/28/2023  23.25 sec with FWW (L > R LE) 10/31 10/31 Partially met;  ongoing   Improve R ankle DF to neutral 9/28/20023  3 deg from neutral PROM, no change   1 deg past neutral PROM  10/31 MET   Improve gross B LE strength to >/=4/5 Flex: 4/5, abd (R) 2+/5 (L) 3-/5, ext 3-/5 10/31  ongoing       Assessment/Plan     ASSESSMENT:   I reassessed the TUG trial today and it was better than her previous session but slower than her previous best of 1:13.09.     PLAN:   Continue working on her B hip ER to make It easier for her to progress with OT donning/doffing shoes and socks.      SIGNATURE: Kentrell Lamb, [place], [place] License #: [id number]  Electronically Signed on 11/2/2023        [address] [place]  [address]  [phone]

## 2023-11-02 NOTE — PROGRESS NOTES
Occupational Therapy Treatment Note  [address] [place] [address]    Patient: Tawny Shin                                                 Visit Date: 2023  :     1955    Referring practitioner:    Moises Oliveira MD  Date of Initial Visit:          Type: THERAPY  Noted: 2023    Patient seen for 17 sessions    Visit Diagnoses:    ICD-10-CM ICD-9-CM   1. Functional neurological symptom disorder with weakness or paralysis  F44.4 300.11   2. Lower paraplegia  G82.20 344.1   3. Decreased activities of daily living (ADL)  Z78.9 V49.89     SUBJECTIVE     Subjective:  Pt reports she has had a good week. Pt reports she still has pain and soreness in her L knee. Pt   Reports she was able to complete toileting independently last night with increased time.     PAIN: 3/10 in L shoulder        OBJECTIVE     Objective     Self-Care/IADL Training    84124 Comments   Practice donning shorts over her pants in sitting and standing with use of FWW and CGA. Pt demo'd swaying in standing while alternating hands to pull up pants. Pt was able to doff pants with mod I using grab bar in bathroom for standing and finished sitting in w/c.     Practiced toilet transfer with use of grab bar with supervision. Pt practiced LB clothing management for toileting with grab bar for support and supervision.     Pt doffed/donned B shoes with mod I using leg  to cross legs to tie shoes.     Practiced bed mobility on mat table using a leg  to complete sit<>supine. Pt is able to swing legs onto bed with mod I using leg  on R leg and crossing L leg over the R.         Timed Minutes 45     Total Timed Treatment:     45  mins  Total Time of Visit:             45   mins       Therapy Education/Self Care 33863   Education offered today Encouraged pt to continue practicing ADL and IADL tasks at home with increased independence.    [facility] Code     Ongoing HEP   BUE strengthening program   B  strengthening exercises with personal digi-flex   Practicing ADL and IADL tasks with decreasing assist from caregivers.    Timed Minutes      ASSESSMENT/PLAN     GOALS:  Goals                                          Progress Note due by 11/14/23                                                      Recert due by 11/14/23   STG by: 11/14/23 Comments Date Status   Pt will be independent with daily completion of a HEP to address B UE and core strength deficits.   Pt reports HEP performance daily.   Ongoing    Pt will complete all aspects of LB dressing and toileting with Mod I.  Donned/doffed shorts in sitting and standing with use of FWW and CGA during standing. Doffed/donned with shoes with mod I.   Progressing   Pt will increase B UE strength to 5/5 at all joints to increase ADL and IADL independence.   10/12 Met          LTG by: 11/14/23      Pt will complete all functional ADL transfers with Mod I and good safety.  Practiced toilet transfer with supervision. Practiced bed mobility with mod I. Completed sit<>stand multiple times with FWW and mod I for ADL performance.   Progressing    Pt will complete simulated household IADL tasks in sitting or standing when possible displaying Mod I and good safety.    Progressing    Pt will increase B hand  strength to 30# for improved performance with ADL and IADL tasks.   Pt continues to perform  strengthening at home with digi-flex.   Progressing             Assessment/Plan     ASSESSMENT:   Pt continues to demonstrate improvements LB dressing and clothing management during toileting.   Pt reports continued progress with these skills at home using grab bar rather than FWW for toileting.   Pt continues to struggle with bed mobility and completion of sit-supine d/t LE strength. Pt able to   Complete independently using leg  and leg crossing technique.     PLAN:   Will continue to address IADL tasks completion in  standing and ADL participation.     Signature:  Denise Flynn OT Student  Electronically signed on 11/2/2023     The clinical instructor and/or supervising staff, TIFFANY Padilla, was present in clinic guiding the visit by approving, concurring, and confirming the skilled judgement for all services rendered.    Signature:  TIFFANY Padilla, [place] License #: [id number]  Electronically signed on 11/2/2023       [address] [place] [address], [place] [address]  [phone]

## 2023-11-07 ENCOUNTER — TREATMENT (OUTPATIENT)
Dept: PHYSICAL THERAPY | Facility: CLINIC | Age: 68
End: 2023-11-07
Payer: MEDICARE

## 2023-11-07 ENCOUNTER — OFFICE VISIT (OUTPATIENT)
Dept: UROLOGY | Facility: CLINIC | Age: 68
End: 2023-11-07
Payer: MEDICARE

## 2023-11-07 VITALS — HEIGHT: 67 IN | TEMPERATURE: 96.9 F | BODY MASS INDEX: 27.88 KG/M2

## 2023-11-07 DIAGNOSIS — G82.20 LOWER PARAPLEGIA: ICD-10-CM

## 2023-11-07 DIAGNOSIS — N31.9 NEUROGENIC BLADDER: Primary | ICD-10-CM

## 2023-11-07 DIAGNOSIS — N39.41 URGE INCONTINENCE: ICD-10-CM

## 2023-11-07 DIAGNOSIS — Z78.9 DECREASED ACTIVITIES OF DAILY LIVING (ADL): ICD-10-CM

## 2023-11-07 DIAGNOSIS — F44.4 FUNCTIONAL NEUROLOGICAL SYMPTOM DISORDER WITH WEAKNESS OR PARALYSIS: Primary | ICD-10-CM

## 2023-11-07 PROCEDURE — 97535 SELF CARE MNGMENT TRAINING: CPT

## 2023-11-07 PROCEDURE — 99213 OFFICE O/P EST LOW 20 MIN: CPT | Performed by: UROLOGY

## 2023-11-07 PROCEDURE — 1159F MED LIST DOCD IN RCRD: CPT | Performed by: UROLOGY

## 2023-11-07 PROCEDURE — 1160F RVW MEDS BY RX/DR IN RCRD: CPT | Performed by: UROLOGY

## 2023-11-07 NOTE — PROGRESS NOTES
Occupational Therapy Treatment Note  [address] [place] [address]    Patient: Tawny Shin                                                 Visit Date: 2023  :     1955    Referring practitioner:    Moises Oliveira MD  Date of Initial Visit:          Type: THERAPY  Noted: 2023    Patient seen for 18 sessions    Visit Diagnoses:    ICD-10-CM ICD-9-CM   1. Functional neurological symptom disorder with weakness or paralysis  F44.4 300.11   2. Lower paraplegia  G82.20 344.1   3. Decreased activities of daily living (ADL)  Z78.9 V49.89     SUBJECTIVE     Subjective:  Pt reports she has had pain in B shoulders over the past week. She reports she was able to go see   her son this week at the hospital and that was helpful for her. She reports things have gone well at   Home and she has been able to gain some independence with LB ADLs recently.     PAIN: 3/10 in L shoulder        OBJECTIVE     Objective     Self-Care/IADL Training    01565 Comments   Donned and doffed socks independently with increased time and raised feet.     Pt was able to doff and don tennis shoes with mod I using modified technique learned in previous sessions.      Pt participated in dynamic reaching activity in the kitchen in standing, focusing on balance and using walker and counter top for stability. Pt was able to reach to and from second shelf in the upper cabinet with no difficulty using B UE.     Discussed w/c positioning for hand washing at kitchen sink. Pt reports she struggles with this at home d/t to the depth of her kitchen sink. Encourage pt to approach the sink sideways to allow for further reach toward the faucet.         Timed Minutes 45     Total Timed Treatment:     45  mins  Total Time of Visit:             45   mins       Therapy Education/Self Care 62498   Education offered today Encouraged pt to continue practicing ADL and IADL tasks at  home with increased independence.    [facility] Code    Ongoing HEP   BUE strengthening program   B  strengthening exercises with personal digi-flex   Practicing ADL and IADL tasks with decreasing assist from caregivers.    Timed Minutes      ASSESSMENT/PLAN     GOALS:  Goals                                          Progress Note due by 11/14/23                                                      Recert due by 11/14/23   STG by: 11/14/23 Comments Date Status   Pt will be independent with daily completion of a HEP to address B UE and core strength deficits.   Pt reports HEP performance daily.   Ongoing    Pt will complete all aspects of LB dressing and toileting with Mod I.  Reports she has donned/doffed shoes on independently multiple times this week. She has been able to toilet completely independently multiple times this week.     Pt was able to don/doff shoes and socks independently with extended time.     Progressing   Pt will increase B UE strength to 5/5 at all joints to increase ADL and IADL independence.   10/12 Met          LTG by: 11/14/23      Pt will complete all functional ADL transfers with Mod I and good safety.  Completed sit<>stands and mat<>w/c transfers for ADL participation independently.   Progressing    Pt will complete simulated household IADL tasks in sitting or standing when possible displaying Mod I and good safety.  Completed IADL simulation in standing focusing on dynamic reaching and safety during kitchen IADLs.   Progressing    Pt will increase B hand  strength to 30# for improved performance with ADL and IADL tasks.   Pt reports she is still using her digi-flex at home. She uses 5# for whole hand power grasp and 3# for isolated finger flexion.   Progressing             Assessment/Plan     ASSESSMENT:   Pt continues to demonstrate improvements with LB ADLs and ADL transfers for improved   Participation at home. Pt is doing well with HEP and  strengthening at home. Pt is doing    Well with IADL simulations in the clinic and reports increased performance at home.     PLAN:   Will continue to address IADL tasks completion in standing and ADL participation.     Signature:  Denise Flynn OT Student  Electronically signed on 11/7/2023     The clinical instructor and/or supervising staff, TIFFANY Padilla, was present in clinic guiding the visit by approving, concurring, and confirming the skilled judgement for all services rendered.    Signature:  KATHYA Padilla/L, [place] License #: [id number]  Electronically signed on 11/7/2023       [address] [place] [address], [place] [address]  [phone]

## 2023-11-07 NOTE — PROGRESS NOTES
Physical Therapy Treatment Note  [address] [place] [address]    Patient: Tawny Shin                                                 Visit Date: 2023  :     1955    Referring practitioner:    Moises Oliveira MD  Date of Initial Visit:          Type: THERAPY  Noted: 2023    Patient seen for 36 sessions    Visit Diagnoses:    ICD-10-CM ICD-9-CM   1. Functional neurological symptom disorder with weakness or paralysis  F44.4 300.11   2. Lower paraplegia  G82.20 344.1     SUBJECTIVE     Subjective:Her B shoulders hurt not sure why      PAIN: 3/10         OBJECTIVE     Objective       Therapeutic Exercises    16775 Units Comments   B LE Shuttle Press with eccentric focus 6 bands     B unilateral LE Shuttle Press with 4 bands x 6 min each           B unilateral LE Shuttle Press with 4 bands x 5 min each      B unilateral DF stretches with pink bolster           Timed Minutes 45      Therapy Education/Self Care 08884   Education offered today    [facility] Code    Ongoing HEP   Date: 10/12/2023  Prepared by: Maria R Hicks     Bed:  - Prone Knee Extension with Ankle Weight  - 2-3 x daily - 7 x weekly - 30-60 seconds hold  - Supine Bridge  - 2 x daily - 7 x weekly - 2 sets - 10 reps  - Supine March  - 1-2 x daily - 7 x weekly - 2 sets - 10 reps  - Supine Active Straight Leg Raise  - 1-2 x daily - 7 x weekly - 2 sets - 10 reps     Chair  - Seated Hamstring Stretch with Chair  - 1-2 x daily - 7 x weekly - 2 sets - 10 reps  - Belt Assisted Dorsiflexion   - 1-2 x daily - 7 x weekly - 2 sets - 10 reps  - Standing Bilateral Gastroc Stretch with Step  - 1-2 x daily - 7 x weekly - 2 sets - 10 reps  - Seated Marching with Opposite Shoulder Flexion  - 1-2 x daily - 7 x weekly - 2 sets - 10 reps  - Seated Long Arc Quad  - 1-2 x daily - 7 x weekly - 2 sets - 10 reps  - Seated Gluteal Sets  - 1-2 x daily - 7 x weekly - 2 sets - 10  reps  - seated clamshells  - 1-2 x daily - 7 x weekly - 2 sets - 10 reps   Timed Minutes        Total Timed Treatment:     45   mins  Total Time of Visit:             45   mins         ASSESSMENT/PLAN     GOALS  Goals                                            Progress Note due by 11/29/2023                                                                Recert due by 11/29/2023   LTG by: 12 weeks Comments Date Status   Patient will report compliance with HEP.  She reports compliance 3-4x/week 10/31 ongoing   Pt will be able to maintain static standing w/ 50/50 Wbing for >/=3 mins w/o UE support/a to improve capacity for ADL/IADLs 60# RLE, 150# LLE for 51.88 secs 9/28  3:51 min decreased WB R LE 10/3/23  3:38 min decreased WB R LE 10/31/23    10/31 ongoing   Patient to perform TUG in </=2 mins w/ FWW without LOB for improved capacity for household ambulation. 1:13.09 on 10/10/23  6:15.68 min, 4 seated rest breaks 10/31  1:37.92 today 11/2 ongoing   Pt will be able to perform 5x STS w/ 50/50 Wbing in </=2 mins for improved functional strength 37.95 secs but asymmetrical, ~60# at most on RLE on 9/28/2023  23.25 sec with FWW (L > R LE) 10/31 10/31 Partially met;  ongoing   Improve R ankle DF to neutral 9/28/20023  3 deg from neutral PROM, no change   1 deg past neutral PROM  10/31 MET   Improve gross B LE strength to >/=4/5 Flex: 4/5, abd (R) 2+/5 (L) 3-/5, ext 3-/5 10/31  ongoing       Assessment/Plan     ASSESSMENT:   She continues to do well. We focused on strength and flexibility today.     PLAN:   Consider working on gait endurance next session    SIGNATURE: Kentrell Lamb PTA, [place] License #: [id number]  Electronically Signed on 11/7/2023        [place] [address]  [phone]

## 2023-11-09 ENCOUNTER — TREATMENT (OUTPATIENT)
Dept: PHYSICAL THERAPY | Facility: CLINIC | Age: 68
End: 2023-11-09
Payer: MEDICARE

## 2023-11-09 DIAGNOSIS — F44.4 FUNCTIONAL NEUROLOGICAL SYMPTOM DISORDER WITH WEAKNESS OR PARALYSIS: Primary | ICD-10-CM

## 2023-11-09 DIAGNOSIS — G82.20 LOWER PARAPLEGIA: ICD-10-CM

## 2023-11-09 DIAGNOSIS — Z78.9 DECREASED ACTIVITIES OF DAILY LIVING (ADL): ICD-10-CM

## 2023-11-09 NOTE — PROGRESS NOTES
Occupational Therapy Treatment Note  [address] [place] [address]    Patient: Tawny Shin                                                 Visit Date: 2023  :     1955    Referring practitioner:    Moises Oliveira MD  Date of Initial Visit:          Type: THERAPY  Noted: 2023    Patient seen for 19 sessions    Visit Diagnoses:    ICD-10-CM ICD-9-CM   1. Functional neurological symptom disorder with weakness or paralysis  F44.4 300.11   2. Lower paraplegia  G82.20 344.1   3. Decreased activities of daily living (ADL)  Z78.9 V49.89     SUBJECTIVE     Subjective:  Pt reports she has been doing well over the last couple days and has been attempting to go  To the bathroom more to increase her independence when her caregiver allows.       PAIN: 0/10 in L shoulder        OBJECTIVE     Objective     Self-Care/IADL Training    09364 Comments   Sit<>stand completed with CGA/Min A. Pt was able to use RW to side step along counter top to retrieve items from high cabinets with mod difficulty focusing on dynamic reaching while in standing. Pt was able to grab items from first and second shelves in upper cabinets with less difficulty reported.                     Timed Minutes 15     Therapeutic Exercises    05445 Units Comments   Pt completed B UE strengthening using 3# dumbbell performing elbow flexion/extension, shoulder flexion/abduction, shoulder press, and tricep extension to improve functional transfers.     B hand power grasp completed using 3# digi-flex performing 1 set of 20 reps each. Pt then completed B hand isolated finger flexion performing 1 set of 10 reps each digit.     B hand isolated finger extension completed using rubber band performing 1 set of 20 reps each.               Timed Minutes 30        Total Timed Treatment:     45  mins  Total Time of Visit:             45   mins       Therapy Education/Self Care 84566    Education offered today Encouraged pt to continue practicing ADL and IADL tasks at home with increased independence.    [facility] Code    Ongoing HEP   BUE strengthening program   B  strengthening exercises with personal digi-flex   Practicing ADL and IADL tasks with decreasing assist from caregivers.    Timed Minutes      ASSESSMENT/PLAN     GOALS:  Goals                                          Progress Note due by 11/14/23                                                      Recert due by 11/14/23   STG by: 11/14/23 Comments Date Status   Pt will be independent with daily completion of a HEP to address B UE and core strength deficits.   Pt reports HEP performance daily.   Ongoing    Pt will complete all aspects of LB dressing and toileting with Mod I.  Performance continues to improve.    Progressing   Pt will increase B UE strength to 5/5 at all joints to increase ADL and IADL independence.   10/12 Met          LTG by: 11/14/23      Pt will complete all functional ADL transfers with Mod I and good safety.  Completed sit<>stands with CGA/Min A today.   Progressing    Pt will complete simulated household IADL tasks in sitting or standing when possible displaying Mod I and good safety.  Completed IADL simulation in standing focusing on dynamic reaching and safety during kitchen IADLs.   Progressing    Pt will increase B hand  strength to 30# for improved performance with ADL and IADL tasks.   Pt reports she is still using her digi-flex at home. She uses 5# for whole hand power grasp and 3# for isolated finger flexion.   Progressing             Assessment/Plan     ASSESSMENT:   Pt continues to show improvement towards her goals and has been completing her  HEP at home. Pt has been showing improvement with LB dressing and standing  Activity tolerance while using UE.    PLAN:   Will complete progress note at next session.       Signature:  Kasie Moe, OTR/L, [place] License #: [id number]  Electronically signed on  11/9/2023       [address] [place] [address] [place] [address]  [phone]

## 2023-11-09 NOTE — PROGRESS NOTES
"                                                                Physical Therapy Treatment Note  [address] [place] [address]    Patient: Tawny Shin                                                 Visit Date: 2023  :     1955    Referring practitioner:    Moises Oliveira MD  Date of Initial Visit:          Type: THERAPY  Noted: 2023    Patient seen for 37 sessions    Visit Diagnoses:    ICD-10-CM ICD-9-CM   1. Functional neurological symptom disorder with weakness or paralysis  F44.4 300.11   2. Lower paraplegia  G82.20 344.1     SUBJECTIVE     Subjective:She reports feeling tired after OT.       PAIN: 0/10         OBJECTIVE     Objective       Gait Training          77445   Task/Terrain Asst AD Comments   Walking for maximum tolerance x 3 reps   Rwx Followed with WC seated rests between each  rep. 14'4\", 9'5\", 5'4\"               Timed Minutes 25      Therapeutic Exercises    87497 Units Comments    B unilateral hip ER stretches with intermittent inferior lateral glides      DF stretches with pink bolster      TKE stretches with pink bolster                Timed Minutes 19      Therapy Education/Self Care 13782   Education offered today    [facility] Code    Ongoing HEP   Date: 10/12/2023  Prepared by: Maria R Hicks     Bed:  - Prone Knee Extension with Ankle Weight  - 2-3 x daily - 7 x weekly - 30-60 seconds hold  - Supine Bridge  - 2 x daily - 7 x weekly - 2 sets - 10 reps  - Supine March  - 1-2 x daily - 7 x weekly - 2 sets - 10 reps  - Supine Active Straight Leg Raise  - 1-2 x daily - 7 x weekly - 2 sets - 10 reps     Chair  - Seated Hamstring Stretch with Chair  - 1-2 x daily - 7 x weekly - 2 sets - 10 reps  - Belt Assisted Dorsiflexion   - 1-2 x daily - 7 x weekly - 2 sets - 10 reps  - Standing Bilateral Gastroc Stretch with Step  - 1-2 x daily - 7 x weekly - 2 sets - 10 reps  - Seated Marching with Opposite Shoulder Flexion  - 1-2 x daily - 7 x weekly - 2 sets - 10 " reps  - Seated Long Arc Quad  - 1-2 x daily - 7 x weekly - 2 sets - 10 reps  - Seated Gluteal Sets  - 1-2 x daily - 7 x weekly - 2 sets - 10 reps  - seated clamshells  - 1-2 x daily - 7 x weekly - 2 sets - 10 reps   Timed Minutes        Total Timed Treatment:     44   mins  Total Time of Visit:             44   mins         ASSESSMENT/PLAN     GOALS  Goals                                            Progress Note due by 11/29/2023                                                                Recert due by 11/29/2023   LTG by: 12 weeks Comments Date Status   Patient will report compliance with HEP.  She reports compliance 3-4x/week 10/31 ongoing   Pt will be able to maintain static standing w/ 50/50 Wbing for >/=3 mins w/o UE support/a to improve capacity for ADL/IADLs 60# RLE, 150# LLE for 51.88 secs 9/28  3:51 min decreased WB R LE 10/3/23  3:38 min decreased WB R LE 10/31/23    10/31 ongoing   Patient to perform TUG in </=2 mins w/ FWW without LOB for improved capacity for household ambulation. 1:13.09 on 10/10/23  6:15.68 min, 4 seated rest breaks 10/31  1:37.92 today 11/2 ongoing   Pt will be able to perform 5x STS w/ 50/50 Wbing in </=2 mins for improved functional strength 37.95 secs but asymmetrical, ~60# at most on RLE on 9/28/2023  23.25 sec with FWW (L > R LE) 10/31 10/31 Partially met;  ongoing   Improve R ankle DF to neutral 9/28/20023  3 deg from neutral PROM, no change   1 deg past neutral PROM  10/31 MET   Improve gross B LE strength to >/=4/5 Flex: 4/5, abd (R) 2+/5 (L) 3-/5, ext 3-/5 10/31  ongoing       Assessment/Plan     ASSESSMENT:   We focused a little more on walking today and will likely replicate this over the next few sessions    PLAN:   See assessment section    SIGNATURE: Kentrell Lamb PTA, [place] License #: [id number]  Electronically Signed on 11/9/2023        [place] [address]  [phone]

## 2023-11-14 ENCOUNTER — TREATMENT (OUTPATIENT)
Dept: PHYSICAL THERAPY | Facility: CLINIC | Age: 68
End: 2023-11-14
Payer: MEDICARE

## 2023-11-14 DIAGNOSIS — G82.20 LOWER PARAPLEGIA: ICD-10-CM

## 2023-11-14 DIAGNOSIS — Z78.9 DECREASED ACTIVITIES OF DAILY LIVING (ADL): ICD-10-CM

## 2023-11-14 DIAGNOSIS — F44.4 FUNCTIONAL NEUROLOGICAL SYMPTOM DISORDER WITH WEAKNESS OR PARALYSIS: Primary | ICD-10-CM

## 2023-11-14 NOTE — PROGRESS NOTES
"                                                                Physical Therapy Treatment Note  [address] [place] [address]    Patient: Tawny Shin                                                 Visit Date: 2023  :     1955    Referring practitioner:    Moises Oliveira MD  Date of Initial Visit:          Type: THERAPY  Noted: 2023    Patient seen for 38 sessions    Visit Diagnoses:    ICD-10-CM ICD-9-CM   1. Functional neurological symptom disorder with weakness or paralysis  F44.4 300.11   2. Lower paraplegia  G82.20 344.1     SUBJECTIVE     Subjective:She reports her L shoulder, hands and knees are hurting today but she thinks it's just arthritis.       PAIN: 4/10         OBJECTIVE     Objective     Gait Training          01337   Task/Terrain Asst AD Comments   Walking for maximum tolerance x 3 reps   minAx1 Rwx Followed with WC seated rests between each  rep. 7'2\",9\"3',11'1                       Timed Minutes 21       Therapeutic Exercises    23786 Units Comments   B pec and thoracic stretches with 1/2 roller in sitting      B unilateral DF and hamstring stretches in sitting     R LE LAQ's with #3 2 x 10                Timed Minutes 23      Therapy Education/Self Care 10080   Education offered today    Medbride Code    Ongoing HEP   Date: 10/12/2023  Prepared by: Maria R Hicks     Bed:  - Prone Knee Extension with Ankle Weight  - 2-3 x daily - 7 x weekly - 30-60 seconds hold  - Supine Bridge  - 2 x daily - 7 x weekly - 2 sets - 10 reps  - Supine March  - 1-2 x daily - 7 x weekly - 2 sets - 10 reps  - Supine Active Straight Leg Raise  - 1-2 x daily - 7 x weekly - 2 sets - 10 reps     Chair  - Seated Hamstring Stretch with Chair  - 1-2 x daily - 7 x weekly - 2 sets - 10 reps  - Belt Assisted Dorsiflexion   - 1-2 x daily - 7 x weekly - 2 sets - 10 reps  - Standing Bilateral Gastroc Stretch with Step  - 1-2 x daily - 7 x weekly - 2 sets - 10 reps  - Seated Marching " with Opposite Shoulder Flexion  - 1-2 x daily - 7 x weekly - 2 sets - 10 reps  - Seated Long Arc Quad  - 1-2 x daily - 7 x weekly - 2 sets - 10 reps  - Seated Gluteal Sets  - 1-2 x daily - 7 x weekly - 2 sets - 10 reps  - seated clamshells  - 1-2 x daily - 7 x weekly - 2 sets - 10 reps   Timed Minutes        Total Timed Treatment:     44   mins  Total Time of Visit:             44   mins         ASSESSMENT/PLAN     GOALS  Goals                                            Progress Note due by 11/29/2023                                                                Recert due by 11/29/2023   LTG by: 12 weeks Comments Date Status   Patient will report compliance with HEP.  She reports compliance 3-4x/week 10/31 ongoing   Pt will be able to maintain static standing w/ 50/50 Wbing for >/=3 mins w/o UE support/a to improve capacity for ADL/IADLs 60# RLE, 150# LLE for 51.88 secs 9/28  3:51 min decreased WB R LE 10/3/23  3:38 min decreased WB R LE 10/31/23    10/31 ongoing   Patient to perform TUG in </=2 mins w/ FWW without LOB for improved capacity for household ambulation. Walked 3 reps up to 11 ft. 11/14 ongoing   Pt will be able to perform 5x STS w/ 50/50 Wbing in </=2 mins for improved functional strength 37.95 secs but asymmetrical, ~60# at most on RLE on 9/28/2023  23.25 sec with FWW (L > R LE) 10/31 10/31 Partially met;  ongoing   Improve R ankle DF to neutral 9/28/20023  3 deg from neutral PROM, no change   1 deg past neutral PROM  10/31 MET   Improve gross B LE strength to >/=4/5 Flex: 4/5, abd (R) 2+/5 (L) 3-/5, ext 3-/5 10/31  ongoing       Assessment/Plan     ASSESSMENT:   We continued gait for a second consecutive session and each rep she walked further    PLAN:   Continue gait training and compare results    SIGNATURE: Kentrell Lamb PTA, [place] License #: [id number]  Electronically Signed on 11/14/2023        [facility] [place] [address]  [phone]

## 2023-11-14 NOTE — PROGRESS NOTES
Occupational Therapy 90 day Progress Note and Recertification   [address] [place] [address]    Patient: Tawny Shin   : 1955  Referring practitioner: Moises Oliveira MD  Date of Initial Visit: 2023  Today's Date: 2023  Patient seen for 20 sessions    Visit Diagnoses:    ICD-10-CM ICD-9-CM   1. Functional neurological symptom disorder with weakness or paralysis  F44.4 300.11   2. Lower paraplegia  G82.20 344.1   3. Decreased activities of daily living (ADL)  Z78.9 V49.89       SUBJECTIVE     SUBJECTIVE:   Pt reports she has had a good week and was able to go visit her son in the hospital. Pt reports that today she is hurting all over but mainly in her knees and hands, she described it as arthritis pain.     PAIN:   Pt reports no 4/10 pain L shoulder and B hands.     Outcome Measure:   9 Hole Peg Test: Left: 35.65s  Right: 44.53 s    Modified Barthel Index: 62/100, 38% impaired with self-care. Improved.     PT G-Codes  Outcome Measure Options: (P) Quick DASH  Quick DASH Score: (P) 15.91% impaired with UE use. Improved.     OBJECTIVE      Objective          Strength/Myotome Testing     Left Wrist/Hand      (2nd hand position)     Trial 1: 15 lbs    Trial 2: 15 lbs    Trial 3: 14.5 lbs    Average: 14.83 lbs    Right Wrist/Hand      (2nd hand position)     Trial 1: 20 lbs    Trial 2: 20 lbs    Trial 3: 20 lbs    Average: 20 lbs    Additional Strength Details        Ambulation     Comments   Has been walking short distances with PT with use of FWW. Supervision-CGA for most ADL transfers with use of FWW.     Functional Assessment     Comments  ADL  Dressing: Independent UB. Mod A for LB. Don/doff shoes and socks independently. Improving.  Bathing: Supervision-CGA for transfer. Completes bathing independently. Improving.   Toileting: Supervision-CGA for transfer. Occasionally Min A for clothing. Mod I for hygiene.    Grooming: Independent   Feeding: Independent     IADL  Pt completes light cooking and shopping at w/c level. She has continued assisting with household cleaning tasks from the w/c level.        General Comments     Shoulder Comments   B UE AROM is WFL.   5/5 strength at B UE.       Vision assessment: normal and wears corrected lenses/contact for reading.    Cognitive status: oriented to Person, Place, Time, and Situation    Sensation: Reports tingling in toes. No other deficits noted.    Tone: Grossly Normal at B UE.     Midline orientation: Midline    Fine Motor:   Unable to oppose L small finger. Reports hx of arthritis affecting the L small and ring fingers.     Wheelchair Training:  Independent with power w/c mobility.     Therapy Education/Self Care 41369   Education offered today Encouraged pt to continue daily performance of HEP and continue increasing IADL participation at home.    Medbride Code    Ongoing HEP   W/C push ups and modified w/c sit ups for core strengthening and to improve transfers.  BUE strengthening program    Timed Minutes      Therapeutic Exercises    68662 Units Comments   All ADL, ROM, MMT,  strength, and outcome measures addressed for PN. See details above. Discussed progress toward goals, see goals table below.           Timed Minutes 40     Total Timed Treatment:     40  mins  Total Time of Visit:            45   mins    ASSESSMENT/PLAN     GOALS:  Goals                                          Progress Note due by 12/14/23                                                      Recert due by 2/11/24   STG by: 12/14/23 Comments Date Status   Pt will be independent with daily completion of a HEP to address B UE and core strength deficits.   Pt reports HEP performance daily. Pt has purchased some home strengthening equipment for B UE and .     Ongoing    Pt will complete all aspects of LB dressing and toileting with Mod I.  Still requiring modA from caregivers for LB dressing. Pt  is now independent with donning/doffing B socks and shoes. Requires Jose R occasionally for clothing management during toileting.     Progressing   Pt will increase B UE strength to 5/5 at all joints to increase ADL and IADL independence.   10/12 Met          LTG by: 12/14/23      Pt will complete all functional ADL transfers with Mod I and good safety.  Supervision-CGA for most functional ADL transfers using a FWW.   Progressing    Pt will complete simulated household IADL tasks in sitting or standing when possible displaying Mod I and good safety.  Pt CGA for standing IADL tasks, Denis for IADL tasks performed from her w/c.   Progressing    Pt will increase B hand  strength to 30# for improved performance with ADL and IADL tasks.   Pt's L hand  strength scores improved since last PN.   R: 20 lbs  L: 14.83 lbs   Progressing                Assessment & Plan       Assessment  Impairments: abnormal coordination, abnormal or restricted ROM, activity intolerance, impaired balance, impaired physical strength, lacks appropriate home exercise program, pain with function, safety issue and weight-bearing intolerance   Functional limitations: carrying objects, lifting, walking, pulling, pushing, uncomfortable because of pain, moving in bed, standing, stooping and reaching overhead   Assessment details: This pt still has 24 hr caregivers to assist her with ADL/IADL performance, but states that she is beginning to rely less on them for ADL/IADL performance. Pt has maintained her  strength and FMC over the past 4 weeks but has not made much improvement. Although the pt reports that she believes her B hand pain is affecting her scores today. Pt continues to improve her performance on functional ADL transfers, and is now requiring supervision-CGA and use of FWW for most transfers. OT will continue to practice functional ADL transfers in future sessions. She continues to show good rehab potential to increase independence  with daily tasks and decrease the need for caregivers with ADL activities. Due to her plateau in progress, we are decreasing her frequency with OT to 1-2x week. OT will continue to focus on these areas to allow her to reach her max level of function.   Prognosis: good    Plan  Planned therapy interventions: IADL retraining, therapeutic activities, ADL retraining, balance/weight-bearing training, body mechanics training, fine motor coordination training, functional ROM exercises, home exercise program, manual therapy, motor coordination training, neuromuscular re-education, strengthening, abdominal trunk stabilization, flexibility, stretching and transfer training  Frequency: 1x week (1-2x week)  Duration in weeks: 12  Treatment plan discussed with: patient  Plan details: Will continue to address deficits in UE strength, coordination, balance and functional mobility to improve performance of ADL and IADL tasks.       Signature:  Denise Flynn, OT Student     Electronically signed on 11/14/2023      Clinical Progress: improved  Home Program Compliance: Yes  Progress toward previous goals: Partially Met    90 Day Recertification  Certification Period: 11/14/2023 through 2/11/2024  I certify that the therapy services are furnished while this patient is under my care.  The services outlined above are required by this patient, and will be reviewed every 90 days.     PHYSICIAN: Moises Oliveira MD ([id number]: [id number])    Signature:___________________________________________DATE: _________    Please sign and return via fax to [phone].     The clinical instructor and/or supervising staff, TIFFANY Padilla, was present in clinic guiding the visit by approving, concurring, and confirming the skilled judgement for all services rendered.    Signature:  TIFFANY Padilla [place] License #: [id number]  Electronically signed on 11/14/2023       [address]nie [address]isner[address]. [address]  [phone]

## 2023-11-16 ENCOUNTER — TREATMENT (OUTPATIENT)
Dept: PHYSICAL THERAPY | Facility: CLINIC | Age: 68
End: 2023-11-16
Payer: MEDICARE

## 2023-11-16 DIAGNOSIS — G82.20 LOWER PARAPLEGIA: ICD-10-CM

## 2023-11-16 DIAGNOSIS — F44.4 FUNCTIONAL NEUROLOGICAL SYMPTOM DISORDER WITH WEAKNESS OR PARALYSIS: Primary | ICD-10-CM

## 2023-11-16 DIAGNOSIS — Z78.9 DECREASED ACTIVITIES OF DAILY LIVING (ADL): ICD-10-CM

## 2023-11-16 NOTE — PROGRESS NOTES
"                                                                Physical Therapy Treatment Note  [address] [place] [address]    Patient: Tawny Shin                                                 Visit Date: 2023  :     1955    Referring practitioner:    Moises Oliveira MD  Date of Initial Visit:          No linked episodes    Patient seen for Visit count could not be calculated. Make sure you are using a visit which is associated with an episode. sessions    Visit Diagnoses:    ICD-10-CM ICD-9-CM   1. Functional neurological symptom disorder with weakness or paralysis  F44.4 300.11   2. Lower paraplegia  G82.20 344.1     SUBJECTIVE     Subjective:She reports feeling ok but her hands are hurting      PAIN: 4/10         OBJECTIVE     Objective     Gait Training          75030   Task/Terrain Asst AD Comments   Walking for maximum tolerance x 3 reps   minAx1 Rwx Followed with WC seated rests between each  rep. 10'7, 11', 5'2\"                       Timed Minutes 21     Therapeutic Exercises    05859 Units Comments   B LE Shuttle Press with eccentric focus 6 bands x 5 min     B unilateral LE Shuttle Press with 4 bands x 6 min each                     Timed Minutes 20        Therapy Education/Self Care 39899   Education offered today    Medbride Code    Ongoing HEP   Date: 10/12/2023  Prepared by: Maria R Hicks     Bed:  - Prone Knee Extension with Ankle Weight  - 2-3 x daily - 7 x weekly - 30-60 seconds hold  - Supine Bridge  - 2 x daily - 7 x weekly - 2 sets - 10 reps  - Supine March  - 1-2 x daily - 7 x weekly - 2 sets - 10 reps  - Supine Active Straight Leg Raise  - 1-2 x daily - 7 x weekly - 2 sets - 10 reps     Chair  - Seated Hamstring Stretch with Chair  - 1-2 x daily - 7 x weekly - 2 sets - 10 reps  - Belt Assisted Dorsiflexion   - 1-2 x daily - 7 x weekly - 2 sets - 10 reps  - Standing Bilateral Gastroc Stretch with Step  - 1-2 x daily - 7 x weekly - 2 sets - 10 " reps  - Seated Marching with Opposite Shoulder Flexion  - 1-2 x daily - 7 x weekly - 2 sets - 10 reps  - Seated Long Arc Quad  - 1-2 x daily - 7 x weekly - 2 sets - 10 reps  - Seated Gluteal Sets  - 1-2 x daily - 7 x weekly - 2 sets - 10 reps  - seated clamshells  - 1-2 x daily - 7 x weekly - 2 sets - 10 reps   Timed Minutes        Total Timed Treatment:     41   mins  Total Time of Visit:             41   mins         ASSESSMENT/PLAN     GOALS  Goals                                            Progress Note due by 11/29/2023                                                                Recert due by 11/29/2023   LTG by: 12 weeks Comments Date Status   Patient will report compliance with HEP.  She reports compliance 3-4x/week 10/31 ongoing   Pt will be able to maintain static standing w/ 50/50 Wbing for >/=3 mins w/o UE support/a to improve capacity for ADL/IADLs 60# RLE, 150# LLE for 51.88 secs 9/28  3:51 min decreased WB R LE 10/3/23  3:38 min decreased WB R LE 10/31/23    10/31 ongoing   Patient to perform TUG in </=2 mins w/ FWW without LOB for improved capacity for household ambulation. Walked 3 reps up to 11 ft. 11/14 ongoing   Pt will be able to perform 5x STS w/ 50/50 Wbing in </=2 mins for improved functional strength 37.95 secs but asymmetrical, ~60# at most on RLE on 9/28/2023  23.25 sec with FWW (L > R LE) 10/31 10/31 Partially met;  ongoing   Improve R ankle DF to neutral 9/28/20023  3 deg from neutral PROM, no change   1 deg past neutral PROM  10/31 MET   Improve gross B LE strength to >/=4/5 Flex: 4/5, abd (R) 2+/5 (L) 3-/5, ext 3-/5 10/31  ongoing       Assessment/Plan     ASSESSMENT:   She did have a mis-step with her initial attempt to walk and sat down abruptly. She did however walk further today.     PLAN:   Consider testing her standing endurance     SIGNATURE: Kentrell Lamb PTA, [place] License #: [id number]  Electronically Signed on 11/16/2023        [address] [place] [address] [place]  [address]  [phone]

## 2023-11-16 NOTE — PROGRESS NOTES
Occupational Therapy Treatment Note  [address] [place] [address]    Patient: Tawny Shin                                                 Visit Date: 2023  :     1955    Referring practitioner:    Moises Oliveira MD  Date of Initial Visit:          Type: THERAPY  Noted: 2023    Patient seen for 21 sessions    Visit Diagnoses:    ICD-10-CM ICD-9-CM   1. Functional neurological symptom disorder with weakness or paralysis  F44.4 300.11   2. Lower paraplegia  G82.20 344.1   3. Decreased activities of daily living (ADL)  Z78.9 V49.89     SUBJECTIVE     Subjective:  Pt reports she is doing better today and not hurting as bad. Pt reports the only pain she has today is   In B hands, and she describes it as arthritis pain.     PAIN: 2-3/10 B hands        OBJECTIVE     Objective     Self-Care/IADL Training    40963 Comments   Pt completed cooking tasks of baking cookies, including opening packaging, selecting cookware, measuring and mixing ingredients, following directions, navigating the kitchen, and using appliances. Pt completed all the tasks with mod I in standing with use of FWW and from w/c level with min difficulty.     Completed sit<>stand from w/c using FWW with mod I.                 Timed Minutes 30     Therapeutic Exercises    13748 Units Comments   Completed B  strengthening using 7# digi-flex for whole hand power grasp and 3# digi-flex for isolated finger grasp ~40 reps x1 in each.                          Timed Minutes 15      Total Timed Treatment:     45  mins  Total Time of Visit:             45   mins       Therapy Education/Self Care 78379   Education offered today Encouraged pt to continue practicing ADL and IADL tasks at home with increased independence.    Medbride Code    Ongoing HEP   BUE strengthening program   B  strengthening exercises with personal digi-flex   Practicing ADL and IADL tasks with  decreasing assist from caregivers.    Timed Minutes      ASSESSMENT/PLAN     GOALS:  Goals                                          Progress Note due by 12/14/23                                                      Recert due by 2/11/24   STG by: 12/14/23 Comments Date Status   Pt will be independent with daily completion of a HEP to address B UE and core strength deficits.   Pt reports HEP performance daily. Pt has purchased some home strengthening equipment for B UE and .     Ongoing    Pt will complete all aspects of LB dressing and toileting with Mod I.    Progressing   Pt will increase B UE strength to 5/5 at all joints to increase ADL and IADL independence.   10/12 Met          LTG by: 12/14/23      Pt will complete all functional ADL transfers with Mod I and good safety.  Completed sit<>stand from w/c using FWW with mod I.   Progressing    Pt will complete simulated household IADL tasks in sitting or standing when possible displaying Mod I and good safety.  Completed cooking tasks in kitchen with mod I from standing using FWW and w/c level with min difficulty.   Progressing    Pt will increase B hand  strength to 30# for improved performance with ADL and IADL tasks.   Completed  strengthening with B hands using 7# and 3# digi-flex.   Progressing             Assessment/Plan     ASSESSMENT:   Pt continues to demonstrate increased independence with ADL transfers and IADL performance.   Pt demo'd improvements in B  strength, and reports continued compliance with HEP. Pt reports   That she has been independently don/doffing socks and shoes, but she has been struggling with   Socks more.     PLAN:   Will continue to address LB dressing skills and ADL transfers.       Signature:  Denise Flynn OT Student  Electronically signed on 11/16/2023     The clinical instructor and/or supervising staff, Kasie Moe OTR/L, was present in clinic guiding the visit by approving, concurring, and confirming the  skilled judgement for all services rendered.    Signature:  Kasie Moe, OTR/L, [place] License #: [id number]  Electronically signed on 11/16/2023             [address] [place] [address]  [phone]

## 2023-11-28 ENCOUNTER — TREATMENT (OUTPATIENT)
Dept: PHYSICAL THERAPY | Facility: CLINIC | Age: 68
End: 2023-11-28
Payer: MEDICARE

## 2023-11-28 DIAGNOSIS — F44.4 FUNCTIONAL NEUROLOGICAL SYMPTOM DISORDER WITH WEAKNESS OR PARALYSIS: Primary | ICD-10-CM

## 2023-11-28 DIAGNOSIS — G82.20 LOWER PARAPLEGIA: ICD-10-CM

## 2023-11-28 NOTE — PROGRESS NOTES
Physical Therapy Treatment Note and 30 Day Progress Note  [address] [place] [address]    Patient: Tawny Shin                                                 Visit Date: 2023  :     1955    Referring practitioner:    Moises Oliveira MD  Date of Initial Visit:          Type: THERAPY  Noted: 2023    Patient seen for 39 sessions    Visit Diagnoses:    ICD-10-CM ICD-9-CM   1. Functional neurological symptom disorder with weakness or paralysis  F44.4 300.11   2. Lower paraplegia  G82.20 344.1     SUBJECTIVE     Subjective:She reports no issues with pain, or falls      PAIN: 0/10         OBJECTIVE     Objective     Therapeutic Exercises    60519 Units Comments   TUG x 2     5 x STS     MMT B LE      Stand for max x 2 with seated rest between          Timed Minutes 45      LE MMT   HIP Strength L Strength R   flexion 5/5 4+/5   extension     ABD     ADD      IR     ER          KNEE     flexion 4+/5 4-/5   extension 4+/5 5/5         ANKLE     dorsiflexion 4+/5 3+/5   plantarflexion     eversion     inversion     *pain     Therapy Education/Self Care 32995   Education offered today    Medbride Code    Ongoing HEP   Date: 10/12/2023  Prepared by: Maria R Hicks     Bed:  - Prone Knee Extension with Ankle Weight  - 2-3 x daily - 7 x weekly - 30-60 seconds hold  - Supine Bridge  - 2 x daily - 7 x weekly - 2 sets - 10 reps  - Supine March  - 1-2 x daily - 7 x weekly - 2 sets - 10 reps  - Supine Active Straight Leg Raise  - 1-2 x daily - 7 x weekly - 2 sets - 10 reps     Chair  - Seated Hamstring Stretch with Chair  - 1-2 x daily - 7 x weekly - 2 sets - 10 reps  - Belt Assisted Dorsiflexion   - 1-2 x daily - 7 x weekly - 2 sets - 10 reps  - Standing Bilateral Gastroc Stretch with Step  - 1-2 x daily - 7 x weekly - 2 sets - 10 reps  - Seated Marching with Opposite Shoulder Flexion  - 1-2 x daily - 7 x weekly - 2 sets - 10  reps  - Seated Long Arc Quad  - 1-2 x daily - 7 x weekly - 2 sets - 10 reps  - Seated Gluteal Sets  - 1-2 x daily - 7 x weekly - 2 sets - 10 reps  - seated clamshells  - 1-2 x daily - 7 x weekly - 2 sets - 10 reps   Timed Minutes        Total Timed Treatment:     45   mins  Total Time of Visit:             45   mins         ASSESSMENT/PLAN     GOALS  Goals                                            Progress Note due by 12/28/2023                                                                Recert due by 11/29/2023   LTG by: 12 weeks Comments Date Status   Patient will report compliance with HEP.  Reinforced today 11/28 ongoing   Pt will be able to maintain static standing w/ 50/50 Wbing for >/=3 mins w/o UE support/a to improve capacity for ADL/IADLs 1:38.26  1:56.06 11/28 ongoing   Patient to perform TUG in </=2 mins w/ FWW without LOB for improved capacity for household ambulation. [place] 1:1:32.88  [place] 2: 1:49.42 11/28 ongoing   Pt will be able to perform 5x STS w/ 50/50 Wbing in </=2 mins for improved functional strength 13.19 sec 10/31 Partially met;  ongoing   Improve R ankle DF to neutral 9/28/20023  3 deg from neutral PROM, no change   1 deg past neutral PROM  10/31 MET   Improve gross B LE strength to >/=4/5 See table  11/28  ongoing       Assessment/Plan     ASSESSMENT:   As opposed to her previous progress note I made sure to give her liberal rest breaks between testing each goal today. She has improved R LE strength but her stand for maximum time decreased today but she attributed this to hand issues.     PLAN:   Work on preparing for stair ambulation and also meet with OT next session to see what issues we need to address for her due to OT availability     SIGNATURE: Kentrell Lamb PTA, [place] License #: [id number]  Electronically Signed on 11/28/2023        [place] [address]  [phone]

## 2023-11-29 NOTE — PROGRESS NOTES
[facility] - PODIATRY    Today's Date: 12/05/2023     Patient Name: Tawny Shin  MRN: [id number]  CSN: [id number]  PCP: Moises Oliveira MD  Referring Provider: No ref. provider found    SUBJECTIVE     Chief Complaint   Patient presents with    Follow-up     Moises Oliveira MD-05/10/2023 3 MTH FU- pt states spot on top of 2nd toe left foot, has healed some but turning darkish now and still sore- pt pain 3/10 at worst with shoes on     HPI: Tawny Shin, a 68 y.o.female, comes to clinic as a(n) established patient complaining of thickened, irregular toenails of both feet . Patient has h/o arthritis, asthma, bunions, CA, Heart block, Charcot joint of left foot, DVT, COPD, CAD, HLD, HTN, Hypothyroid, MI, pacemaker .  Patient presents for nail care of thickened, irregular toenails of both feet. Denies any open wounds or sores currently. States that she feels like she has some neuropathy in feet as she is unable to fully feel toes. Continues use of motorized wheelchair. Admits pain at 3/10 level and described as aching, dull, and numbness. Relates previous treatment(s) including care by podiatry . Patient continues Eliquis daily. Denies any constitutional symptoms. No other pedal complaints at this time.    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    Elevated cholesterol     Eustachian tube dysfunction     Heart disease     Herpes simplex     History of transfusion      "Hyperlipidemia     Hypertension     Hypothyroidism 12/23/2020    Intrinsic asthma 12/23/2020    Knee dislocation     Labral tear of right hip joint     Laryngitis sicca     Laryngitis, chronic     Left carotid bruit 03/09/2016    MI (myocardial infarction)     Myalgia due to statin 06/25/2019    Open wound of right hip 09/14/2021    Osteomyelitis of right femur 07/06/2021    Otorrhea     Pacemaker 11/17/2016    Primary osteoarthritis of left knee 12/23/2020    Psoriasis vulgaris 12/23/2020    S/P coronary artery stent placement 03/09/2016    Sensorineural hearing loss     Seropositive rheumatoid arthritis of multiple sites 12/27/2019    Overview:  -myochrysine '93-'96 -methotrexate '96--->11/98;r/s  restarted 2/99--> 8/14 (anemia) -sulfasalazine- not effective -penicillamine 6/98-->10/98; no effect -leflunomide 11/98--> - Humira '13-->didn't take - Enbrel 12/14-->3/15- no effect!   Last Assessment & Plan:  - \"aching all over\" because she had to be off her anti-rheumatic drugs for 2 weeks in preparation for her R knee surgery - he    Sick sinus syndrome 12/27/2019    Sjogren's disease     Spondylolisthesis of lumbar region 01/17/2018    Syncope, recurrent 02/08/2021    Urinary tract infection      Past Surgical History:   Procedure Laterality Date    A-V CARDIAC PACEMAKER INSERTION  2016    ATRIAL CARDIAC PACEMAKER INSERTION      CARDIAC CATHETERIZATION      CATARACT EXTRACTION      CERVICAL CORPECTOMY N/A 3/3/2021    Procedure: CERVICAL 6 CORPECTOMY WITH TITANIUM CAGE WITH NEURO MONITORING;  Surgeon: Bandar Seha MD;  Location: [facility];  Service: Neurosurgery;  Laterality: N/A;    COLONOSCOPY  11/08/2011    One fold in the ascending colon which showed ulcer otherwise normal exam    COLONOSCOPY  11/12/2004    Normal exam repeat in five years    CORONARY ANGIOPLASTY WITH STENT PLACEMENT      X 2; 2013 & 2014    ENDOSCOPY  07/10/2014    Normal exam    FLAP LEG Right 9/14/2021    Procedure: RIGHT GLUTEAL " FASCIOCUTANEOUS ADVANCEMENT FLAP AND RIGHT TENSOR FASCIAL JESSICA FLAP;  Surgeon: Amadeo Turner MD;  Location:  [facility];  Service: Plastics;  Laterality: Right;    HIP ABDUCTION TENOTOMY BILATERAL Right 1/14/2021    Procedure: RIGHT HIP GLUTEUS MEDLUS / MINIMUS REPAIR, POSSIBLE ACHILLES ALLOGRAFT;  Surgeon: Nino Carlson MD;  Location:  [facility];  Service: Orthopedics;  Laterality: Right;    INCISION AND DRAINAGE ABSCESS Right 6/4/2022    Procedure: INCISION AND DRAINAGE ABSCESS right hip;  Surgeon: Magda Salcido MD;  Location:  [facility];  Service: General;  Laterality: Right;    INCISION AND DRAINAGE ABSCESS Right 6/10/2022    Procedure: RIGHT HIP INCISION AND DRAINAGE. MD NEEDS 3L VANC IRRIGATION, CURRETTES, DAICANS, KERLEX ROLLS;  Surgeon: Amadeo Turner MD;  Location:  [facility];  Service: Plastics;  Laterality: Right;    INCISION AND DRAINAGE HIP Right 2/9/2021    Procedure: HIP INCISION AND DRAINAGE;  Surgeon: Nino Carlson MD;  Location:  [facility];  Service: Orthopedics;  Laterality: Right;    INCISION AND DRAINAGE LEG Right 10/24/2021    Procedure: INCISION AND DRAINAGE LOWER EXTREMITY;  Surgeon: Amadeo Turner MD;  Location:  [facility];  Service: Plastics;  Laterality: Right;    INCISION AND DRAINAGE OF WOUND Right 7/8/2021    Procedure: INCISION AND DRAINAGE WOUND RIGHT HIP;  Surgeon: James Huntley MD;  Location:  [facility];  Service: Orthopedics;  Laterality: Right;    JOINT REPLACEMENT      KYPHOPLASTY WITH BIOPSY Bilateral 10/26/2021    Procedure: THOARCIC 12 KYPHOPLASTY WITH BIOPSY;  Surgeon: Bandar Shea MD;  Location:  [facility];  Service: Neurosurgery;  Laterality: Bilateral;    LEG DEBRIDEMENT Right 9/14/2021    Procedure: DEBRIDEMENT OF RIGHT HIP WOUND, RIGHT GLUTEAL FASCIOCUTANEOUS ADVANCEMENT FLAP AND RIGHT TENSOR FASCIAL JESSICA FLAP;  Surgeon: Amadeo Turner MD;  Location:  [facility];  Service: Plastics;  Laterality: Right;    LUMBAR DISCECTOMY Right 3/23/2021     Procedure: LUMBAR DISCECTOMY MICRO, Lumbar 1/2 right;  Surgeon: Bandar Shea MD;  Location:  [facility];  Service: Neurosurgery;  Laterality: Right;    LUMBAR FUSION N/A 1/19/2018    Procedure: L3-4,L4-5 DECOMPRESSION, POSTERIOR SPINAL FUSION WITH INSTRUMENTATION;  Surgeon: Fortino Oropeza MD;  Location:  [facility];  Service:     LUMBAR LAMINECTOMY WITH FUSION Left 1/17/2018    Procedure: LEFT L3-4 L4-5 LATERAL LUMBAR INTERBODY FUSION;  Surgeon: Fortino Oropeza MD;  Location:  [facility];  Service:     MYRINGOTOMY W/ TUBES  09/04/2014    TUBES NO LONGER IN PLACE    OTHER SURGICAL HISTORY      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 so hardware was removed and spacers were placed    REPLACEMENT TOTAL KNEE Right      Family History   Problem Relation Age of Onset    Cancer Mother         Lung cancer    Heart disease Father         Doied of heart. Attack     Social History     Socioeconomic History    Marital status:    Tobacco Use    Smoking status: Never     Passive exposure: Never    Smokeless tobacco: Never   Vaping Use    Vaping Use: Never used   Substance and Sexual Activity    Alcohol use: No    Drug use: Never    Sexual activity: Not Currently     Birth control/protection: Abstinence     Allergies   Allergen Reactions    Atorvastatin Other (See Comments)     LEG CRAMPS      Amoxicillin Rash    Escitalopram Rash    Nabumetone Rash    Niacin Er Rash    Penicillin G Rash    Penicillins Rash    Simvastatin Rash     Current Outpatient Medications   Medication Sig Dispense Refill    acetaminophen (TYLENOL) 325 MG tablet Take 2 tablets by mouth Every 6 (Six) Hours As Needed (mild pain). Indications: Fever, Pain      apixaban (ELIQUIS) 5 MG tablet tablet Take 1 tablet by mouth 2 (Two) Times a Day. Indications: Other - full anticoagulation 180 tablet 3    aspirin 81 MG EC tablet Take 1 tablet by mouth Daily. Indications: Disease involving Lipid Deposits in the Arteries      carvedilol (COREG) 25 MG tablet  Take 1 tablet by mouth 2 (Two) Times a Day With Meals. Indications: Heart Attack 180 tablet 3    Diclofenac Sodium (VOLTAREN) 1 % gel gel Apply 4 g topically to the appropriate area as directed 4 (Four) Times a Day As Needed. Indications: Joint Damage causing Pain and Loss of Function      DULoxetine (CYMBALTA) 60 MG capsule Take 1 capsule by mouth Daily. Indications: Major Depressive Disorder 90 capsule 3    famotidine (PEPCID) 40 MG tablet Take 1 tablet by mouth Daily. Indications: Heartburn 90 tablet 3    ferrous sulfate 324 (65 Fe) MG tablet delayed-release EC tablet Take 1 tablet by mouth Daily With Breakfast. Indications: Iron Deficiency 90 tablet 3    fluticasone (FLONASE) 50 MCG/ACT nasal spray 1 spray into the nostril(s) as directed by provider Daily. 18.2 mL 5    folic acid (FOLVITE) 1 MG tablet Take 1 tablet by mouth Daily. Indications: Anemia From Inadequate Folic Acid 90 tablet 3    furosemide (LASIX) 40 MG tablet Take 1 tablet by mouth Daily. Indications: Edema 90 tablet 3    isosorbide mononitrate (IMDUR) 60 MG 24 hr tablet Take 1 tablet by mouth Every Morning. Indications: Stable Angina Pectoris 90 tablet 3    leflunomide (ARAVA) 20 MG tablet Take 1 tablet by mouth every night at bedtime. Indications: Rheumatoid Arthritis 90 tablet 3    levothyroxine (SYNTHROID, LEVOTHROID) 75 MCG tablet Take 1 tablet by mouth Daily. Indications: Underactive Thyroid 90 tablet 3    Lidocaine 4 % patch Apply  topically every night at bedtime. To lower back  Indications: Arthritis Related to an Inflammatory Disorder      losartan (COZAAR) 50 MG tablet TAKE 1 TABLET BY MOUTH DAILY 90 tablet 3    magnesium hydroxide (MILK OF MAGNESIA) 400 MG/5ML suspension Take 30 mL by mouth Daily As Needed for Constipation. Indications: Constipation      Menthol-Zinc Oxide 0.45-20 % ointment Apply 1 application  topically 2 (Two) Times a Day. Apply to right buttock      multivitamin with minerals tablet tablet Take 1 tablet by mouth  Daily.      nitroglycerin (Nitrostat) 0.4 MG SL tablet Place 1 tablet under the tongue Every 5 (Five) Minutes As Needed for Chest Pain. Take no more than 3 doses in 15 minutes.  12    polyethylene glycol (MIRALAX) 17 GM/SCOOP powder Take 17 g by mouth Daily. Indications: Constipation      predniSONE (DELTASONE) 5 MG tablet Take 1 tablet by mouth Daily. Indications: Acute Bursitis (Patient taking differently: Take 1 mg by mouth Daily. Indications: Acute Bursitis) 90 tablet 3    salsalate (DISALCID) 500 MG tablet TAKE 5 TABLETS BY MOUTH ON MONDAY, WEDNESDAY, AND FRIDAY AND TAKE 4 TABLETS BY MOUTH ON TUESDAY, THURSDAY, AND SATURDAY 384 tablet 3    sucralfate (CARAFATE) 1 g tablet Take 1 tablet by mouth 4 (Four) Times a Day Before Meals & at Bedtime. Indications: Peptic Ulcer 360 tablet 3    TiZANidine (Zanaflex) 2 MG capsule Take 1 capsule by mouth 2 (Two) Times a Day As Needed for Muscle Spasms. 20 capsule 0    traMADol (ULTRAM) 50 MG tablet Take 1 tablet by mouth Every 6 (Six) Hours As Needed for Moderate Pain.      valACYclovir (VALTREX) 500 MG tablet Take 1 tablet by mouth Daily. Indications: Shingles 90 tablet 3    Vibegron 75 MG tablet Take 1 tablet by mouth Daily. 30 tablet 11     No current facility-administered medications for this visit.     Review of Systems   Constitutional:  Negative for chills and fever.   HENT:  Negative for congestion.    Respiratory:  Negative for shortness of breath.    Cardiovascular:  Positive for leg swelling. Negative for chest pain.   Gastrointestinal:  Negative for constipation, diarrhea, nausea and vomiting.   Musculoskeletal:  Positive for arthralgias, back pain and gait problem.        Foot pain   Skin:  Negative for wound.   Neurological:  Positive for numbness.   Hematological:  Bruises/bleeds easily.       OBJECTIVE     Vitals:    12/05/23 0951   Pulse: 72   SpO2: 94%       PHYSICAL EXAM  GEN:   Accompanied by none.     Foot/Ankle Exam    GENERAL  Appearance:  appears  stated age  Orientation:  AAOx3  Affect:  appropriate  Assistance:  wheelchair (motorized)  Right shoe gear: casual shoe  Left shoe gear: casual shoe    VASCULAR     Right Foot Vascularity   Dorsalis pedis:  2+  Posterior tibial:  2+  Skin temperature:  warm  Edema gradin+ and pitting  CFT:  3  Pedal hair growth:  Present  Varicosities:  spider veins     Left Foot Vascularity   Dorsalis pedis:  2+  Posterior tibial:  2+  Skin temperature:  warm  Edema gradin+ and pitting  CFT:  3  Pedal hair growth:  Present  Varicosities:  spider veins     NEUROLOGIC     Right Foot Neurologic   Light touch sensation: diminished  Vibratory sensation: diminished  Hot/Cold sensation: diminished  Protective Sensation using [place] Monofilament:   Sites intact: 10  Sites tested: 10     Left Foot Neurologic   Light touch sensation: diminished  Vibratory sensation: diminished  Hot/Cold sensation:  diminished  Protective Sensation using [place] Monofilament:   Sites intact: 10  Sites tested: 10    MUSCULOSKELETAL     Right Foot Musculoskeletal   Ecchymosis:  none  Tenderness:  toenail problem    Arch:  Normal  Hammertoe:  Second toe, third toe, fourth toe and fifth toe  Hallux valgus: Yes       Left Foot Musculoskeletal   Ecchymosis:  none  Tenderness:  toenail problem  Arch:  Normal  Hammertoe:  Second toe, third toe, fourth toe and fifth toe    MUSCLE STRENGTH     Right Foot Muscle Strength   Foot dorsiflexion:  4-  Foot plantar flexion:  4-  Foot inversion:  4-  Foot eversion:  4-     Left Foot Muscle Strength   Foot dorsiflexion:  4  Foot plantar flexion:  4  Foot inversion:  4  Foot eversion:  4    RANGE OF MOTION     Right Foot Range of Motion   Foot and ankle ROM within normal limits       Left Foot Range of Motion   Foot and ankle ROM within normal limits      DERMATOLOGIC      Right Foot Dermatologic   Skin  Positive for dryness. Negative for cellulitis, erythema and ulcer.   Nails  1.  Positive for  elongated, abnormal thickness and subungual debris.  2.  Positive for elongated, abnormal thickness and subungual debris.  3.  Positive for elongated, abnormal thickness and subungual debris.  4.  Positive for elongated, abnormal thickness and subungual debris.  5.  Positive for elongated, abnormal thickness and subungual debris.     Left Foot Dermatologic   Skin  Positive for dryness. Negative for ulcer.   Nails  1.  Positive for elongated, abnormal thickness and subungual debris.  2.  Positive for elongated, abnormal thickness and subungual debris.  3.  Positive for elongated, abnormal thickness and subungual debris.  4.  Positive for elongated, abnormally thick and subungual debris.  5.  Positive for elongated, abnormally thick and subungual debris.      RADIOLOGY/NUCLEAR:  No results found.    LABORATORY/CULTURE RESULTS:      PATHOLOGY RESULTS:       ASSESSMENT/PLAN     Diagnoses and all orders for this visit:    1. Thickened nail (Primary)    2. Wheelchair dependent    3. Functional gait abnormality    4. Anticoagulant long-term use    5. Near functional paraplegia    6. Hallux valgus, right        Comprehensive lower extremity examination and evaluation was performed.  Discussed findings and treatment plan including risks, benefits, and treatment options with patient in detail. Patient agreed with treatment plan.  After verbal consent obtained, nail(s) x10 debrided of length and thickness with nail nipper without incidence  Patient may maintain nails and calluses at home utilizing emery board or pumice stone between visits as needed   Elevate legs at rest.    An After Visit Summary was printed and given to the patient at discharge, including (if requested) any available informative/educational handouts regarding diagnosis, treatment, or medications. All questions were answered to patient/family satisfaction. Should symptoms fail to improve or worsen they agree to call or return to clinic or to go to the  Emergency Department. Discussed the importance of following up with any needed screening tests/labs/specialist appointments and any requested follow-up recommended by me today. Importance of maintaining follow-up discussed and patient accepts that missed appointments can delay diagnosis and potentially lead to worsening of conditions.  Return in about 3 months (around 3/5/2024) for Follow-up with Podiatry APRN, Schedule [facility]., or sooner if acute issues arise.    Lab Frequency Next Occurrence   Follow Anesthesia Guidelines / Standing Orders Once 03/02/2021   Provide NPO Instructions to Patient Once 03/07/2021   Chlorhexidine Skin Prep Once 03/07/2021   Obtain Informed Consent Once 03/03/2021       This document has been electronically signed by Robert Dickerson DPM on December 5, 2023 10:04 CST

## 2023-11-30 ENCOUNTER — TREATMENT (OUTPATIENT)
Dept: PHYSICAL THERAPY | Facility: CLINIC | Age: 68
End: 2023-11-30
Payer: MEDICARE

## 2023-11-30 DIAGNOSIS — G82.20 LOWER PARAPLEGIA: ICD-10-CM

## 2023-11-30 DIAGNOSIS — Z78.9 DECREASED ACTIVITIES OF DAILY LIVING (ADL): ICD-10-CM

## 2023-11-30 DIAGNOSIS — F44.4 FUNCTIONAL NEUROLOGICAL SYMPTOM DISORDER WITH WEAKNESS OR PARALYSIS: Primary | ICD-10-CM

## 2023-11-30 NOTE — PROGRESS NOTES
Progress Note Addendum      Patient: Tawny Shin           : 1955  Visit Date: 2023  Referring practitioner: Moises Oliveira MD  Date of Initial Visit: Type: THERAPY  Noted: 2023  Patient seen for 39 sessions  Visit Diagnoses:    ICD-10-CM ICD-9-CM   1. Functional neurological symptom disorder with weakness or paralysis  F44.4 300.11   2. Lower paraplegia  G82.20 344.1          Clinical Progress: improved  Home Program Compliance: Yes  Progress toward previous goals: Partially Met  Prognosis to achieve goals: good    Objective     Assessment & Plan       Assessment  Impairments: abnormal coordination, abnormal gait, abnormal muscle firing, abnormal muscle tone, abnormal or restricted ROM, activity intolerance, impaired balance, impaired physical strength, lacks appropriate home exercise program, pain with function, safety issue and weight-bearing intolerance   Functional limitations: carrying objects, lifting, sleeping, walking, pulling, pushing, uncomfortable because of pain, moving in bed, sitting, standing, stooping, reaching behind back, reaching overhead and unable to perform repetitive tasks   Prognosis: good    Plan  Therapy options: will be seen for skilled therapy services  Planned modality interventions: thermotherapy (hydrocollator packs), cryotherapy, low level laser therapy, TENS, dry needling, electrical stimulation/[place] stimulation and ultrasound  Planned therapy interventions: abdominal trunk stabilization, manual therapy, ADL retraining, motor coordination training, balance/weight-bearing training, neuromuscular re-education, body mechanics training, postural training, soft tissue mobilization, spinal/joint mobilization, fine motor coordination training, flexibility, functional ROM exercises, strengthening, gait training, stretching, home exercise program, therapeutic activities, IADL retraining, joint mobilization and transfer training  Frequency: 2x week  Duration in  weeks: 12  Treatment plan discussed with: PTA        I reviewed the treatment and goals with Kentrell Lamb PTA and agree with the POC.    SIGNATURE: Gibran Pritchard PT, License #: [id number]  Electronically Signed on 11/29/2023

## 2023-11-30 NOTE — PROGRESS NOTES
"                                                                Physical Therapy Treatment Note  [address] [place] [address]    Patient: Tawny Shin                                                 Visit Date: 2023  :     1955    Referring practitioner:    Moises Oliveira MD  Date of Initial Visit:          Type: THERAPY  Noted: 2023    Patient seen for 40 sessions    Visit Diagnoses:    ICD-10-CM ICD-9-CM   1. Functional neurological symptom disorder with weakness or paralysis  F44.4 300.11   2. Lower paraplegia  G82.20 344.1     SUBJECTIVE     Subjective:She denies pain and       PAIN: 0/10         OBJECTIVE     Objective     Gait Training          73825   Task/Terrain Asst AD Comments   Walking for maximum tolerance x 3 reps   minAx1 Rwx Followed with WC seated rests between each  rep. 21'1\"20'5\"12'                       Timed Minutes 35       Therapeutic Exercises    67676 Units Comments   B unilateral DF and TKE stretches with intermittent STM                         Timed Minutes 10      Therapy Education/Self Care 74520   Education offered today    [employer] Code    Ongoing HEP   Date: 10/12/2023  Prepared by: Maria R Hicks     Bed:  - Prone Knee Extension with Ankle Weight  - 2-3 x daily - 7 x weekly - 30-60 seconds hold  - Supine Bridge  - 2 x daily - 7 x weekly - 2 sets - 10 reps  - Supine March  - 1-2 x daily - 7 x weekly - 2 sets - 10 reps  - Supine Active Straight Leg Raise  - 1-2 x daily - 7 x weekly - 2 sets - 10 reps     Chair  - Seated Hamstring Stretch with Chair  - 1-2 x daily - 7 x weekly - 2 sets - 10 reps  - Belt Assisted Dorsiflexion   - 1-2 x daily - 7 x weekly - 2 sets - 10 reps  - Standing Bilateral Gastroc Stretch with Step  - 1-2 x daily - 7 x weekly - 2 sets - 10 reps  - Seated Marching with Opposite Shoulder Flexion  - 1-2 x daily - 7 x weekly - 2 sets - 10 reps  - Seated Long Arc Quad  - 1-2 x daily - 7 x weekly - 2 sets - 10 reps  - Seated " Gluteal Sets  - 1-2 x daily - 7 x weekly - 2 sets - 10 reps  - seated clamshells  - 1-2 x daily - 7 x weekly - 2 sets - 10 reps   Timed Minutes        Total Timed Treatment:     45   mins  Total Time of Visit:             45   mins         ASSESSMENT/PLAN     GOALS  Goals                                            Progress Note due by 12/28/2023                                                                Recert due by 11/29/2023   LTG by: 12 weeks Comments Date Status   Patient will report compliance with HEP.  Reinforced today 11/28 ongoing   Pt will be able to maintain static standing w/ 50/50 Wbing for >/=3 mins w/o UE support/a to improve capacity for ADL/IADLs 1:38.26  1:56.06 11/28 ongoing   Patient to perform TUG in </=2 mins w/ FWW without LOB for improved capacity for household ambulation. [place] 1:1:32.88  [place] 2: 1:49.42 11/28 ongoing   Pt will be able to perform 5x STS w/ 50/50 Wbing in </=2 mins for improved functional strength 13.19 sec 10/31 Partially met;  ongoing   Improve R ankle DF to neutral 9/28/20023  3 deg from neutral PROM, no change   1 deg past neutral PROM  10/31 MET   Improve gross B LE strength to >/=4/5 See table  11/28  ongoing       Assessment/Plan     ASSESSMENT:   Her last session was a progress note; therefore, there were no significant changes in regards to her POC goals. I consulted with KATHYA Padilla/L both prior to and at the conclusion of her OT session today as it will likely be her last OT session for an extended period due to staffing issues. During her next progress note we may need to modify and update her goals to include some things OT has been working on with her.    PLAN:   Consider performing manual toe B gastroc and hamstrings followed by stretching.     SIGNATURE: Kentrell Lamb PTA, [place] License #: [id number]  Electronically Signed on 11/30/2023        [address] [place] [address]. [address]  [phone]

## 2023-11-30 NOTE — PROGRESS NOTES
Occupational Therapy Treatment Note  [address] [place] [address]    Patient: Tawny Shin                                                 Visit Date: 2023  :     1955    Referring practitioner:    Moises Oliveira MD  Date of Initial Visit:          Type: THERAPY  Noted: 2023    Patient seen for 22 sessions    Visit Diagnoses:    ICD-10-CM ICD-9-CM   1. Functional neurological symptom disorder with weakness or paralysis  F44.4 300.11   2. Lower paraplegia  G82.20 344.1   3. Decreased activities of daily living (ADL)  Z78.9 V49.89     SUBJECTIVE     Subjective:  Pt reports she has been doing well and had a great thanksgiving last week. Pt reports her shoulders  Had been bothering her but they are feeling much better today. Pt requested to focus on shoulder  Strength as she feels like she has lost a little strength recently.      PAIN: 0/10       OBJECTIVE     Objective       Therapeutic Exercises    68388 Units Comments   Completed B hand strengthening using heavy resistance foam performing 2 sets of 20 reps each.     Pt completed B shoulder strengthening in all planes performing 1 set of 15 reps each using 3# dowel bar with min difficulty.      Pt completed B tricep strengthening pushing through arms of wheelchair focusing on improving overall strength for ADL transfers. Pt completed 1 set of 10 reps with mod difficulty.     Pt completed B hand 3 point pinch strengthening using moderate resistance clip to  10 cotton balls with min difficulty.          Timed Minutes 45      Total Timed Treatment:     45  mins  Total Time of Visit:             45   mins       Therapy Education/Self Care 31740   Education offered today Encouraged pt to continue practicing ADL and IADL tasks at home with increased independence.    [facility] Code    Ongoing HEP   BUE strengthening program   B  strengthening exercises with personal  digi-flex   Practicing ADL and IADL tasks with decreasing assist from caregivers.    Timed Minutes      ASSESSMENT/PLAN     GOALS:  Goals                                          Progress Note due by 12/14/23                                                      Recert due by 2/11/24   STG by: 12/14/23 Comments Date Status   Pt will be independent with daily completion of a HEP to address B UE and core strength deficits.   Pt reports HEP performance daily. Pt has purchased some home strengthening equipment for B UE and .     Ongoing    Pt will complete all aspects of LB dressing and toileting with Mod I.  Pt continues to work on this at home.  Progressing   Pt will increase B UE strength to 5/5 at all joints to increase ADL and IADL independence.   10/12 Met          LTG by: 12/14/23      Pt will complete all functional ADL transfers with Mod I and good safety.  Tricep strengthening completed to improve ADL transfers.  Progressing    Pt will complete simulated household IADL tasks in sitting or standing when possible displaying Mod I and good safety.     Progressing    Pt will increase B hand  strength to 30# for improved performance with ADL and IADL tasks.   Heavy resistance tolerated well today.  Progressing             Assessment/Plan     ASSESSMENT:   Pt has shown great improvement with ADL transfers and overall endurance.   Pt has tolerated heavy resistance and moderate resistance clips with less difficulty.  Pt continues to focus on LB dressing at home. Pt requested B shoulder strengthening  Today as she felt like she had lost a little shoulder strength recently.    PLAN:   Will continue to address LB dressing skills and ADL transfers.         Signature:  KATHYA Padilla/L, [place] License #: [id number]  Electronically signed on 11/30/2023             [address] [place] [address] Donnie Feldman. [address]  [phone]

## 2023-12-01 RX ORDER — SALSALATE 500 MG
TABLET ORAL
Qty: 384 TABLET | Refills: 3 | Status: SHIPPED | OUTPATIENT
Start: 2023-12-01

## 2023-12-04 ENCOUNTER — TELEPHONE (OUTPATIENT)
Dept: PODIATRY | Facility: CLINIC | Age: 68
End: 2023-12-04
Payer: MEDICARE

## 2023-12-04 ENCOUNTER — TREATMENT (OUTPATIENT)
Dept: PHYSICAL THERAPY | Facility: CLINIC | Age: 68
End: 2023-12-04
Payer: MEDICARE

## 2023-12-04 DIAGNOSIS — F44.4 FUNCTIONAL NEUROLOGICAL SYMPTOM DISORDER WITH WEAKNESS OR PARALYSIS: Primary | ICD-10-CM

## 2023-12-04 DIAGNOSIS — G82.20 LOWER PARAPLEGIA: ICD-10-CM

## 2023-12-04 PROCEDURE — 97140 MANUAL THERAPY 1/> REGIONS: CPT | Performed by: PHYSICAL THERAPIST

## 2023-12-04 NOTE — TELEPHONE ENCOUNTER
Called patient regarding appt on 12/05/2023. Left message for patient to return call if any questions or concerns arise.

## 2023-12-04 NOTE — PROGRESS NOTES
Physical Therapy Treatment Note  [address] [place] [address]    Patient: Tawny Shin                                                 Visit Date: 2023  :     1955    Referring practitioner:    Moises Oliveira MD  Date of Initial Visit:          Type: THERAPY  Noted: 2023    Patient seen for 41 sessions    Visit Diagnoses:    ICD-10-CM ICD-9-CM   1. Functional neurological symptom disorder with weakness or paralysis  F44.4 300.11   2. Lower paraplegia  G82.20 344.1     SUBJECTIVE     Subjective:She reports L shoulder and foot pain today not sure why.       PAIN: 5/10         OBJECTIVE     Objective     Manual Therapy     62133  Comments   Manual B pec and thoracic stretches with 1/2 roller    STM unilateral B hamstring complexes and gastroc with Powerboost L1 bulb    Manual B unilateral DF and TKE stretches with manual OP    R hip inferior lateral glides Grade 2 followed with ER stretches        Timed Minutes 45              Therapy Education/Self Care 53688   Education offered today    [facility] Code    Ongoing HEP   Date: 10/12/2023  Prepared by: Maria R Hicks     Bed:  - Prone Knee Extension with Ankle Weight  - 2-3 x daily - 7 x weekly - 30-60 seconds hold  - Supine Bridge  - 2 x daily - 7 x weekly - 2 sets - 10 reps  - Supine March  - 1-2 x daily - 7 x weekly - 2 sets - 10 reps  - Supine Active Straight Leg Raise  - 1-2 x daily - 7 x weekly - 2 sets - 10 reps     Chair  - Seated Hamstring Stretch with Chair  - 1-2 x daily - 7 x weekly - 2 sets - 10 reps  - Belt Assisted Dorsiflexion   - 1-2 x daily - 7 x weekly - 2 sets - 10 reps  - Standing Bilateral Gastroc Stretch with Step  - 1-2 x daily - 7 x weekly - 2 sets - 10 reps  - Seated Marching with Opposite Shoulder Flexion  - 1-2 x daily - 7 x weekly - 2 sets - 10 reps  - Seated Long Arc Quad  - 1-2 x daily - 7 x weekly - 2 sets - 10 reps  - Seated Gluteal  Sets  - 1-2 x daily - 7 x weekly - 2 sets - 10 reps  - seated clamshells  - 1-2 x daily - 7 x weekly - 2 sets - 10 reps   Timed Minutes        Total Timed Treatment:     45   mins  Total Time of Visit:             45   mins         ASSESSMENT/PLAN     GOALS  Goals                                            Progress Note due by 12/28/2023                                                                Recert due by 11/29/2023   LTG by: 12 weeks Comments Date Status   Patient will report compliance with HEP.  Reinforced today 11/28 ongoing   Pt will be able to maintain static standing w/ 50/50 Wbing for >/=3 mins w/o UE support/a to improve capacity for ADL/IADLs 1:38.26  1:56.06 11/28 ongoing   Patient to perform TUG in </=2 mins w/ FWW without LOB for improved capacity for household ambulation. [place] 1:1:32.88  [place] 2: 1:49.42 11/28 ongoing   Pt will be able to perform 5x STS w/ 50/50 Wbing in </=2 mins for improved functional strength 13.19 sec 10/31 Partially met;  ongoing   Improve R ankle DF to neutral 9/28/20023  3 deg from neutral PROM, no change   1 deg past neutral PROM  10/31 MET   Improve gross B LE strength to >/=4/5 See table  11/28  ongoing       Assessment/Plan     ASSESSMENT:   Today I focused a little more on flexibility as her L knee contracture and B DF restrictions greatly effect her gait.     PLAN:   Continue to progress as symptoms allow    SIGNATURE: Kentrell Lamb PTA, [place] License #: [id number]  Electronically Signed on 12/4/2023        [address] [place] [address]. [address]  [phone]

## 2023-12-05 ENCOUNTER — OFFICE VISIT (OUTPATIENT)
Dept: PODIATRY | Facility: CLINIC | Age: 68
End: 2023-12-05
Payer: MEDICARE

## 2023-12-05 VITALS — HEART RATE: 72 BPM | WEIGHT: 178 LBS | OXYGEN SATURATION: 94 % | BODY MASS INDEX: 27.94 KG/M2 | HEIGHT: 67 IN

## 2023-12-05 DIAGNOSIS — M20.11 HALLUX VALGUS, RIGHT: ICD-10-CM

## 2023-12-05 DIAGNOSIS — G82.20 LOWER PARAPLEGIA: ICD-10-CM

## 2023-12-05 DIAGNOSIS — Z99.3 WHEELCHAIR DEPENDENT: ICD-10-CM

## 2023-12-05 DIAGNOSIS — R26.89 FUNCTIONAL GAIT ABNORMALITY: ICD-10-CM

## 2023-12-05 DIAGNOSIS — Z79.01 ANTICOAGULANT LONG-TERM USE: ICD-10-CM

## 2023-12-05 DIAGNOSIS — L60.2 THICKENED NAIL: Primary | ICD-10-CM

## 2023-12-05 PROCEDURE — 11721 DEBRIDE NAIL 6 OR MORE: CPT | Performed by: PODIATRIST

## 2023-12-05 PROCEDURE — 1159F MED LIST DOCD IN RCRD: CPT | Performed by: PODIATRIST

## 2023-12-05 PROCEDURE — 1160F RVW MEDS BY RX/DR IN RCRD: CPT | Performed by: PODIATRIST

## 2023-12-07 ENCOUNTER — TREATMENT (OUTPATIENT)
Dept: PHYSICAL THERAPY | Facility: CLINIC | Age: 68
End: 2023-12-07
Payer: MEDICARE

## 2023-12-07 DIAGNOSIS — G82.20 LOWER PARAPLEGIA: ICD-10-CM

## 2023-12-07 DIAGNOSIS — F44.4 FUNCTIONAL NEUROLOGICAL SYMPTOM DISORDER WITH WEAKNESS OR PARALYSIS: Primary | ICD-10-CM

## 2023-12-07 RX ORDER — SUCRALFATE 1 G/1
1 TABLET ORAL
Qty: 360 TABLET | Refills: 3 | Status: SHIPPED | OUTPATIENT
Start: 2023-12-07

## 2023-12-07 NOTE — PROGRESS NOTES
Physical Therapy Treatment Note  [address] [place] [address]    Patient: Tawny Shin                                                 Visit Date: 2023  :     1955    Referring practitioner:    Moises Oliviera MD  Date of Initial Visit:          Type: THERAPY  Noted: 2023    Patient seen for 42 sessions    Visit Diagnoses:    ICD-10-CM ICD-9-CM   1. Functional neurological symptom disorder with weakness or paralysis  F44.4 300.11   2. Lower paraplegia  G82.20 344.1     SUBJECTIVE     Subjective:She reports pain in her L shoulder and the top of the L foot      PAIN: 5/10         OBJECTIVE     Objective     Manual Therapy     55480  Comments   Manual B pec and thoracic stretches with 1/2 roller     STM unilateral B hamstring complexes and gastroc with Powerboost L1 bulb     Manual B unilateral DF and TKE stretches with manual OP     R hip inferior lateral glides Grade 2 followed with ER stretches           Timed Minutes 45       Therapy Education/Self Care 04406   Education offered today    [facility] Code    Ongoing HEP   Date: 10/12/2023  Prepared by: Maria R Hicks     Bed:  - Prone Knee Extension with Ankle Weight  - 2-3 x daily - 7 x weekly - 30-60 seconds hold  - Supine Bridge  - 2 x daily - 7 x weekly - 2 sets - 10 reps  - Supine March  - 1-2 x daily - 7 x weekly - 2 sets - 10 reps  - Supine Active Straight Leg Raise  - 1-2 x daily - 7 x weekly - 2 sets - 10 reps     Chair  - Seated Hamstring Stretch with Chair  - 1-2 x daily - 7 x weekly - 2 sets - 10 reps  - Belt Assisted Dorsiflexion   - 1-2 x daily - 7 x weekly - 2 sets - 10 reps  - Standing Bilateral Gastroc Stretch with Step  - 1-2 x daily - 7 x weekly - 2 sets - 10 reps  - Seated Marching with Opposite Shoulder Flexion  - 1-2 x daily - 7 x weekly - 2 sets - 10 reps  - Seated Long Arc Quad  - 1-2 x daily - 7 x weekly - 2 sets - 10 reps  - Seated  Gluteal Sets  - 1-2 x daily - 7 x weekly - 2 sets - 10 reps  - seated clamshells  - 1-2 x daily - 7 x weekly - 2 sets - 10 reps   Timed Minutes        Total Timed Treatment:     45   mins  Total Time of Visit:             45   mins         ASSESSMENT/PLAN     GOALS  Goals                                            Progress Note due by 12/28/2023                                                                Recert due by 11/29/2023   LTG by: 12 weeks Comments Date Status   Patient will report compliance with HEP.  Reinforced today 12/7 ongoing   Pt will be able to maintain static standing w/ 50/50 Wbing for >/=3 mins w/o UE support/a to improve capacity for ADL/IADLs 1:38.26  1:56.06 11/28 ongoing   Patient to perform TUG in </=2 mins w/ FWW without LOB for improved capacity for household ambulation. [place] 1:1:32.88  [place] 2: 1:49.42 11/28 ongoing   Pt will be able to perform 5x STS w/ 50/50 Wbing in </=2 mins for improved functional strength 13.19 sec 10/31 Partially met;  ongoing   Improve R ankle DF to neutral 9/28/20023  3 deg from neutral PROM, no change   1 deg past neutral PROM  10/31 MET   Improve gross B LE strength to >/=4/5 See table  11/28  ongoing       Assessment/Plan     ASSESSMENT:   We focused again on increasing overall flexibility again as opposed to walking today. Her R knee flexion contracture has improved over the last two sessions which is great since it was one of her barriers to walking further    PLAN:   Return to gait for distance activities but consider stretching her B knee extension prior to walking    SIGNATURE: Kentrell Lamb PTA, [place] License #: [id number]  Electronically Signed on 12/7/2023        [address] [place] [address] [place] [address]  [phone]

## 2023-12-12 ENCOUNTER — TREATMENT (OUTPATIENT)
Dept: PHYSICAL THERAPY | Facility: CLINIC | Age: 68
End: 2023-12-12
Payer: MEDICARE

## 2023-12-12 DIAGNOSIS — F44.4 FUNCTIONAL NEUROLOGICAL SYMPTOM DISORDER WITH WEAKNESS OR PARALYSIS: Primary | ICD-10-CM

## 2023-12-12 DIAGNOSIS — G82.20 LOWER PARAPLEGIA: ICD-10-CM

## 2023-12-12 NOTE — PROGRESS NOTES
"                                                                Physical Therapy Treatment Note  [address] [place] [address]    Patient: Tawny Shin                                                 Visit Date: 2023  :     1955    Referring practitioner:    Moises Oliveira MD  Date of Initial Visit:          Type: THERAPY  Noted: 2023    Patient seen for 43 sessions    Visit Diagnoses:    ICD-10-CM ICD-9-CM   1. Functional neurological symptom disorder with weakness or paralysis  F44.4 300.11   2. Lower paraplegia  G82.20 344.1     SUBJECTIVE     Subjective: She reports her toes are numb and her L foot hurts when she goes to step on it. Also pain in L shoulder. Denies falls/near falls, c/c.    PAIN: 3/10     OBJECTIVE     Objective     Therapeutic Exercises    16821 Units Comments   B knee ext stretch, passively      B ankle DF stretch, passively      B hamstring stretch, passively               Timed Minutes 25     Gait Training          52040   Task/Terrain Asst AD Comments   Gait in hallway with FWW CGA-SBA FWW 32 ft 3 in, seated rest break x3-4 min, 15'11\"               Timed Minutes 15     Therapy Education/Self Care 33402   Education offered today    [facility] Code [id number]    Ongoing HEP   Date: 10/12/2023  Prepared by: Maria R Hicks     Bed:  - Prone Knee Extension with Ankle Weight  - 2-3 x daily - 7 x weekly - 30-60 seconds hold  - Supine Bridge  - 2 x daily - 7 x weekly - 2 sets - 10 reps  - Supine March  - 1-2 x daily - 7 x weekly - 2 sets - 10 reps  - Supine Active Straight Leg Raise  - 1-2 x daily - 7 x weekly - 2 sets - 10 reps     Chair  - Seated Hamstring Stretch with Chair  - 1-2 x daily - 7 x weekly - 2 sets - 10 reps  - Belt Assisted Dorsiflexion   - 1-2 x daily - 7 x weekly - 2 sets - 10 reps  - Standing Bilateral Gastroc Stretch with Step  - 1-2 x daily - 7 x weekly - 2 sets - 10 reps  - Seated Marching with Opposite Shoulder Flexion  - 1-2 x daily - " 7 x weekly - 2 sets - 10 reps  - Seated Long Arc Quad  - 1-2 x daily - 7 x weekly - 2 sets - 10 reps  - Seated Gluteal Sets  - 1-2 x daily - 7 x weekly - 2 sets - 10 reps  - seated clamshells  - 1-2 x daily - 7 x weekly - 2 sets - 10 reps   Timed Minutes      Total Timed Treatment:     45   mins  Total Time of Visit:             45   mins         ASSESSMENT/PLAN     GOALS  Goals                                            Progress Note due by 12/28/2023                                                             Recert due by 2/28/2024   LTG by: 12 weeks Comments Date Status   Patient will report compliance with HEP.  Reinforced today 12/7 ongoing   Pt will be able to maintain static standing w/ 50/50 Wbing for >/=3 mins w/o UE support/a to improve capacity for ADL/IADLs 1:38.26  1:56.06 11/28 ongoing   Patient to perform TUG in </=2 mins w/ FWW without LOB for improved capacity for household ambulation. [place] 1:1:32.88  [place] 2: 1:49.42 11/28 ongoing   Pt will be able to perform 5x STS w/ 50/50 Wbing in </=2 mins for improved functional strength 13.19 sec 10/31 Partially met;  ongoing   Improve R ankle DF to neutral 9/28/20023  3 deg from neutral PROM, no change   1 deg past neutral PROM  10/31 MET   Improve gross B LE strength to >/=4/5 See table  11/28  ongoing     Assessment/Plan     ASSESSMENT: Continued to address LE mobility this session and followed with gait for endurance. She was able to walk a total of 47 feet, with one seated rest break. First time walking, she was able to ambulate 32 ft 3 in. She remains limited by R ankle DF ROM, which limits her ability to WS onto R LE properly as well as restricts heel strike and stance phases.     PLAN: Continue to prioritize B knee ext and ankle DF.     SIGNATURE: Maria R Garay PTA, [place] License #: [id number]  Electronically Signed on 12/12/2023        [address] [place] [address] [address]  [place] [address]  [phone]

## 2023-12-14 ENCOUNTER — TREATMENT (OUTPATIENT)
Dept: PHYSICAL THERAPY | Facility: CLINIC | Age: 68
End: 2023-12-14
Payer: MEDICARE

## 2023-12-14 DIAGNOSIS — F44.4 FUNCTIONAL NEUROLOGICAL SYMPTOM DISORDER WITH WEAKNESS OR PARALYSIS: Primary | ICD-10-CM

## 2023-12-14 DIAGNOSIS — G82.20 LOWER PARAPLEGIA: ICD-10-CM

## 2023-12-14 NOTE — PROGRESS NOTES
"                                                                Physical Therapy Treatment Note  [address] [place] [address]    Patient: Tawny Shin                                                 Visit Date: 2023  :     1955    Referring practitioner:    Moises Oliveira MD  Date of Initial Visit:          Type: THERAPY  Noted: 2023    Patient seen for 44 sessions    Visit Diagnoses:    ICD-10-CM ICD-9-CM   1. Functional neurological symptom disorder with weakness or paralysis  F44.4 300.11   2. Lower paraplegia  G82.20 344.1     SUBJECTIVE     Subjective:She reports she feels ok no pain or complaints      PAIN: 0/10         OBJECTIVE     Objective     Manual Therapy     00411  Comments   STM R hamstring complex and gastroc with Power boost L1 bulb                    Timed Minutes 10         Therapeutic Exercises    09995 Units Comments   B knee ext stretch, passively        B ankle DF stretch, passively        B hamstring stretch, passively                       Timed Minutes 15        Gait Training          84156   Task/Terrain Asst AD Comments   Walking for increased CARMELO and B SL x 3 reps   CGA/Jose R x 1 followed with WC Rwx 19'2\", 14' 15'7               Timed Minutes 17        Therapy Education/Self Care 78106   Education offered today    [facility] Code [id number]     Ongoing HEP   Date: 10/12/2023  Prepared by: Maria R Hicks     Bed:  - Prone Knee Extension with Ankle Weight  - 2-3 x daily - 7 x weekly - 30-60 seconds hold  - Supine Bridge  - 2 x daily - 7 x weekly - 2 sets - 10 reps  - Supine March  - 1-2 x daily - 7 x weekly - 2 sets - 10 reps  - Supine Active Straight Leg Raise  - 1-2 x daily - 7 x weekly - 2 sets - 10 reps     Chair  - Seated Hamstring Stretch with Chair  - 1-2 x daily - 7 x weekly - 2 sets - 10 reps  - Belt Assisted Dorsiflexion   - 1-2 x daily - 7 x weekly - 2 sets - 10 reps  - Standing Bilateral Gastroc Stretch with Step  - 1-2 x daily - 7 x " weekly - 2 sets - 10 reps  - Seated Marching with Opposite Shoulder Flexion  - 1-2 x daily - 7 x weekly - 2 sets - 10 reps  - Seated Long Arc Quad  - 1-2 x daily - 7 x weekly - 2 sets - 10 reps  - Seated Gluteal Sets  - 1-2 x daily - 7 x weekly - 2 sets - 10 reps  - seated clamshells  - 1-2 x daily - 7 x weekly - 2 sets - 10 reps   Timed Minutes        Total Timed Treatment:     42   mins  Total Time of Visit:             42   mins         ASSESSMENT/PLAN     GOALS  Goals                                            Progress Note due by 12/28/2023                                                             Recert due by 2/28/2024   LTG by: 12 weeks Comments Date Status   Patient will report compliance with HEP.  Reinforced today 12/7 ongoing   Pt will be able to maintain static standing w/ 50/50 Wbing for >/=3 mins w/o UE support/a to improve capacity for ADL/IADLs 1:38.26  1:56.06 11/28 ongoing   Patient to perform TUG in </=2 mins w/ FWW without LOB for improved capacity for household ambulation. [place] 1:1:32.88  [place] 2: 1:49.42 11/28 ongoing   Pt will be able to perform 5x STS w/ 50/50 Wbing in </=2 mins for improved functional strength 13.19 sec 10/31 Partially met;  ongoing   Improve R ankle DF to neutral 9/28/20023  3 deg from neutral PROM, no change   1 deg past neutral PROM  10/31 MET   Improve gross B LE strength to >/=4/5 See table  11/28  ongoing       Assessment/Plan     ASSESSMENT:   We utilized the mirror today to encourage a WBOS and equal SL.     PLAN:   Continue to use a mirror for cues for gait training.    SIGNATURE: Kentrell Lamb [facility], [place] License #: [id number]  Electronically Signed on 12/14/2023        [address] [place] [address]. [address]  [phone]

## 2023-12-19 ENCOUNTER — TREATMENT (OUTPATIENT)
Dept: PHYSICAL THERAPY | Facility: CLINIC | Age: 68
End: 2023-12-19
Payer: MEDICARE

## 2023-12-19 DIAGNOSIS — G82.20 LOWER PARAPLEGIA: ICD-10-CM

## 2023-12-19 DIAGNOSIS — F44.4 FUNCTIONAL NEUROLOGICAL SYMPTOM DISORDER WITH WEAKNESS OR PARALYSIS: Primary | ICD-10-CM

## 2023-12-19 NOTE — PROGRESS NOTES
Physical Therapy Treatment Note  [address] [place] [address]    Patient: Tawny Shin                                                 Visit Date: 2023  :     1955    Referring practitioner:    Moises Oliveira MD  Date of Initial Visit:          Type: THERAPY  Noted: 2023    Patient seen for 45 sessions    Visit Diagnoses:    ICD-10-CM ICD-9-CM   1. Functional neurological symptom disorder with weakness or paralysis  F44.4 300.11   2. Lower paraplegia  G82.20 344.1     SUBJECTIVE     Subjective:She reports she has not had any falls or near falls since last time. Notes her L knee has been hurting a little bit lately.      PAIN: 0/10         OBJECTIVE     Objective     Manual Therapy     44436  Comments   B hamstring stretch, passively    B ankle DF stretch, passively     B knee ext stretch, passively         Timed Minutes 20         Neuromuscular Reeducation     [id number] Comments   Standing weight shift on foam Mirror, side to side   Standing weight shift on foam Mirror, Tandem stance               Timed Minutes 23      Therapy Education/Self Care 92321   Education offered today    [facility] Code [id number]     Ongoing HEP   Date: 10/12/2023  Prepared by: Maria R Hicks     Bed:  - Prone Knee Extension with Ankle Weight  - 2-3 x daily - 7 x weekly - 30-60 seconds hold  - Supine Bridge  - 2 x daily - 7 x weekly - 2 sets - 10 reps  - Supine March  - 1-2 x daily - 7 x weekly - 2 sets - 10 reps  - Supine Active Straight Leg Raise  - 1-2 x daily - 7 x weekly - 2 sets - 10 reps     Chair  - Seated Hamstring Stretch with Chair  - 1-2 x daily - 7 x weekly - 2 sets - 10 reps  - Belt Assisted Dorsiflexion   - 1-2 x daily - 7 x weekly - 2 sets - 10 reps  - Standing Bilateral Gastroc Stretch with Step  - 1-2 x daily - 7 x weekly - 2 sets - 10 reps  - Seated Marching with Opposite Shoulder Flexion  - 1-2 x daily - 7 x weekly - 2  sets - 10 reps  - Seated Long Arc Quad  - 1-2 x daily - 7 x weekly - 2 sets - 10 reps  - Seated Gluteal Sets  - 1-2 x daily - 7 x weekly - 2 sets - 10 reps  - seated clamshells  - 1-2 x daily - 7 x weekly - 2 sets - 10 reps   Timed Minutes      Total Timed Treatment:     43   mins  Total Time of Visit:             43   mins         ASSESSMENT/PLAN     GOALS  Goals                                            Progress Note due by 12/28/2023                                                             Recert due by 2/28/2024   LTG by: 12 weeks Comments Date Status   Patient will report compliance with HEP.  Reinforced today 12/7 ongoing   Pt will be able to maintain static standing w/ 50/50 Wbing for >/=3 mins w/o UE support/a to improve capacity for ADL/IADLs 1:38.26  1:56.06 11/28 ongoing   Patient to perform TUG in </=2 mins w/ FWW without LOB for improved capacity for household ambulation. [place] 1:1:32.88  [place] 2: 1:49.42 11/28 ongoing   Pt will be able to perform 5x STS w/ 50/50 Wbing in </=2 mins for improved functional strength 13.19 sec 10/31 Partially met;  ongoing   Improve R ankle DF to neutral 9/28/20023  3 deg from neutral PROM, no change   1 deg past neutral PROM  10/31 MET   Improve gross B LE strength to >/=4/5 See table  11/28  ongoing     Assessment/Plan     ASSESSMENT: We utilized the mirror today to encourage a WBOS and equal SL once again today. She was unable to step up onto the foam pads for tandem stance, and had to move her W/C right up to them to stand up. She also required multiple attempts to stand from W/C with R LE posterior in staggered stance. When attempted A/P WS in this stance, pt unable to anteriorly or posteriorly WS and instead would alternate between knee flex and hip flex in a rocking motion. She cont to present with increased R LE tightness limiting her ambulatory and stability.    PLAN: Continue to use a mirror for cues for gait training.    SIGNATURE: Ash Vazquez, PTA, [place]  License #: [id number]  Electronically Signed on 12/19/2023        [address]  [place] [address]  [phone]

## 2023-12-21 ENCOUNTER — TREATMENT (OUTPATIENT)
Dept: PHYSICAL THERAPY | Facility: CLINIC | Age: 68
End: 2023-12-21
Payer: MEDICARE

## 2023-12-21 ENCOUNTER — TELEPHONE (OUTPATIENT)
Dept: INTERNAL MEDICINE | Facility: CLINIC | Age: 68
End: 2023-12-21
Payer: MEDICARE

## 2023-12-21 DIAGNOSIS — F44.4 FUNCTIONAL NEUROLOGICAL SYMPTOM DISORDER WITH WEAKNESS OR PARALYSIS: Primary | ICD-10-CM

## 2023-12-21 DIAGNOSIS — G82.20 LOWER PARAPLEGIA: ICD-10-CM

## 2023-12-21 NOTE — TELEPHONE ENCOUNTER
Caller: Tawny Shin    Relationship: Self    Best call back number: [phone]     What medication are you requesting: ANTIBIOTIC    What are your current symptoms: STRONG URINE ODOR    How long have you been experiencing symptoms: ABOUT 2 WEEKS    Have you had these symptoms before:    [x] Yes  [] No    Have you been treated for these symptoms before:   [x] Yes  [] No    If a prescription is needed, what is your preferred pharmacy and phone number: [facility] [address] [place], [place] [address] [address] - [phone]  - [phone] FX     Additional notes: PATIENT BELIEVES SHE HAS A UTI. SHE STATES SHE IS HAVING NO PAIN JUST THE STRONG ODOR. PLEASE CALL BACK WHEN SOMETHING IS CALLED IN.

## 2023-12-21 NOTE — PROGRESS NOTES
Physical Therapy Treatment Note  [address] [place] [address]    Patient: Tawny Shin                                                 Visit Date: 2023  :     1955    Referring practitioner:    Moises Oliveira MD  Date of Initial Visit:          Type: THERAPY  Noted: 2023    Patient seen for 46 sessions    Visit Diagnoses:    ICD-10-CM ICD-9-CM   1. Functional neurological symptom disorder with weakness or paralysis  F44.4 300.11   2. Lower paraplegia  G82.20 344.1     SUBJECTIVE     Subjective: She reports that the stretches to the back of her knee really hurt last time.     PAIN: 0/10     OBJECTIVE     Objective     Manual Therapy     60101  Comments   Percussive IASTM using Powerboost to B gluteals, hamstrings at L3 using ball attachment in SL  L > R   B ankle TCJ mobilizations            Timed Minutes 25     Therapeutic Exercises    43836 Units Comments   Standing with foot flat and erect posture  Unable before   B ankle DF passive stretch     B knee ext stretch passively                Timed Minutes 30      Therapy Education/Self Care 71407   Education offered today    [facility] Code [id number]     Ongoing HEP   Date: 10/12/2023  Prepared by: Maria R Hicks     Bed:  - Prone Knee Extension with Ankle Weight  - 2-3 x daily - 7 x weekly - 30-60 seconds hold  - Supine Bridge  - 2 x daily - 7 x weekly - 2 sets - 10 reps  - Supine March  - 1-2 x daily - 7 x weekly - 2 sets - 10 reps  - Supine Active Straight Leg Raise  - 1-2 x daily - 7 x weekly - 2 sets - 10 reps     Chair  - Seated Hamstring Stretch with Chair  - 1-2 x daily - 7 x weekly - 2 sets - 10 reps  - Belt Assisted Dorsiflexion   - 1-2 x daily - 7 x weekly - 2 sets - 10 reps  - Standing Bilateral Gastroc Stretch with Step  - 1-2 x daily - 7 x weekly - 2 sets - 10 reps  - Seated Marching with Opposite Shoulder Flexion  - 1-2 x daily - 7 x weekly - 2  sets - 10 reps  - Seated Long Arc Quad  - 1-2 x daily - 7 x weekly - 2 sets - 10 reps  - Seated Gluteal Sets  - 1-2 x daily - 7 x weekly - 2 sets - 10 reps  - seated clamshells  - 1-2 x daily - 7 x weekly - 2 sets - 10 reps   Timed Minutes      Total Timed Treatment:     55   mins  Total Time of Visit:             55   mins         ASSESSMENT/PLAN     GOALS  Goals                                            Progress Note due by 12/28/2023                                                             Recert due by 2/28/2024   LTG by: 12 weeks Comments Date Status   Patient will report compliance with HEP.  Reinforced today 12/7 ongoing   Pt will be able to maintain static standing w/ 50/50 Wbing for >/=3 mins w/o UE support/a to improve capacity for ADL/IADLs 1:38.26  1:56.06 11/28 ongoing   Patient to perform TUG in </=2 mins w/ FWW without LOB for improved capacity for household ambulation. [place] 1:1:32.88  [place] 2: 1:49.42 11/28 ongoing   Pt will be able to perform 5x STS w/ 50/50 Wbing in </=2 mins for improved functional strength 13.19 sec 10/31 Partially met;  ongoing   Improve R ankle DF to neutral 9/28/20023  3 deg from neutral PROM, no change   1 deg past neutral PROM  10/31 MET   Improve gross B LE strength to >/=4/5 See table  11/28  ongoing     Assessment/Plan     ASSESSMENT: Pt continues to be limited by contractures which limit her ability for R knee ext and ankle DF, therefore prioritized today. By end of session, pt was able to place foot flat on ground and stand more erect, though remained limited by knee ROM.     PLAN: Address all goals for progress note. Continue with emphasis on knee and ankle mobility, may assess hip ext PROM. Time permitting, consider neurocom for WS and equal WB.     SIGNATURE: Maria R Garay PTA, [place] License #: [id number]  Electronically Signed on 12/21/2023        [address] [place] [address] [place] [address]  [phone]

## 2023-12-22 ENCOUNTER — OFFICE VISIT (OUTPATIENT)
Dept: INTERNAL MEDICINE | Facility: CLINIC | Age: 68
End: 2023-12-22
Payer: MEDICARE

## 2023-12-22 VITALS
DIASTOLIC BLOOD PRESSURE: 70 MMHG | BODY MASS INDEX: 29.82 KG/M2 | WEIGHT: 190 LBS | SYSTOLIC BLOOD PRESSURE: 106 MMHG | TEMPERATURE: 97.3 F | OXYGEN SATURATION: 95 % | HEIGHT: 67 IN | HEART RATE: 79 BPM

## 2023-12-22 DIAGNOSIS — R82.90 FOUL SMELLING URINE: Primary | ICD-10-CM

## 2023-12-22 DIAGNOSIS — Z22.39 PSEUDOMONAS AERUGINOSA COLONIZATION: ICD-10-CM

## 2023-12-22 LAB
BILIRUB BLD-MCNC: NEGATIVE MG/DL
CLARITY, POC: ABNORMAL
COLOR UR: YELLOW
EXPIRATION DATE: ABNORMAL
GLUCOSE UR STRIP-MCNC: NEGATIVE MG/DL
KETONES UR QL: NEGATIVE
LEUKOCYTE EST, POC: ABNORMAL
Lab: ABNORMAL
NITRITE UR-MCNC: POSITIVE MG/ML
PH UR: 6.5 [PH] (ref 5–8)
PROT UR STRIP-MCNC: NEGATIVE MG/DL
RBC # UR STRIP: ABNORMAL /UL
SP GR UR: 1.01 (ref 1–1.03)
UROBILINOGEN UR QL: ABNORMAL

## 2023-12-22 PROCEDURE — 3078F DIAST BP <80 MM HG: CPT | Performed by: INTERNAL MEDICINE

## 2023-12-22 PROCEDURE — 3074F SYST BP LT 130 MM HG: CPT | Performed by: INTERNAL MEDICINE

## 2023-12-22 PROCEDURE — 1160F RVW MEDS BY RX/DR IN RCRD: CPT | Performed by: INTERNAL MEDICINE

## 2023-12-22 PROCEDURE — 99213 OFFICE O/P EST LOW 20 MIN: CPT | Performed by: INTERNAL MEDICINE

## 2023-12-22 PROCEDURE — 1159F MED LIST DOCD IN RCRD: CPT | Performed by: INTERNAL MEDICINE

## 2023-12-22 PROCEDURE — 81003 URINALYSIS AUTO W/O SCOPE: CPT | Performed by: INTERNAL MEDICINE

## 2023-12-22 RX ORDER — FLUCONAZOLE 150 MG/1
150 TABLET ORAL ONCE
Qty: 1 TABLET | Refills: 0 | Status: SHIPPED | OUTPATIENT
Start: 2023-12-22 | End: 2023-12-22

## 2023-12-22 NOTE — PROGRESS NOTES
"    Chief Complaint  Foul Smelling Urine (2 weeks)    Subjective        Tawny Shin presents to [facility]  History of Present Illness  See below.     Objective   Vital Signs:  /70 (BP Location: Left arm, Patient Position: Sitting, Cuff Size: Adult)   Pulse 79   Temp 97.3 °F (36.3 °C) (Infrared)   Ht 170.2 cm (67\")   Wt 86.2 kg (190 lb) Comment: per pt  SpO2 95%   BMI 29.76 kg/m²   Estimated body mass index is 29.76 kg/m² as calculated from the following:    Height as of this encounter: 170.2 cm (67\").    Weight as of this encounter: 86.2 kg (190 lb).       Physical Exam  Constitutional:       Comments: Looks good.  Nontoxic.  Not ill-appearing.   HENT:      Head: Normocephalic and atraumatic.   Eyes:      Conjunctiva/sclera: Conjunctivae normal.      Pupils: Pupils are equal, round, and reactive to light.   Cardiovascular:      Rate and Rhythm: Normal rate and regular rhythm.      Heart sounds: Normal heart sounds.   Pulmonary:      Effort: Pulmonary effort is normal. No respiratory distress.      Breath sounds: Normal breath sounds.   Musculoskeletal:         General: Swelling (trace) present.      Cervical back: Neck supple.   Skin:     General: Skin is warm and dry.      Findings: No rash.   Neurological:      General: No focal deficit present.      Mental Status: She is alert and oriented to person, place, and time.      Comments: In a power chair.   Psychiatric:         Mood and Affect: Mood normal.         Behavior: Behavior normal.         Thought Content: Thought content normal.         Judgment: Judgment normal.        Result Review :  Most recent chemistries were in December 2022.  Creatinine 1.26.  Had been 0.73 in August 2022.    March 19, 2023      December 12, 2022      10/24/2022               Assessment and Plan   Diagnoses and all orders for this visit:    1. Foul smelling urine (Primary)  -     POC Urinalysis Dipstick, Automated  -     Urine Culture " - Urine, Urine, Clean Catch  -     fluconazole (DIFLUCAN) 150 MG tablet; Take 1 tablet by mouth 1 (One) Time for 1 dose. May repeat dose in 72 hours if needed.  Dispense: 1 tablet; Refill: 0    2. Pseudomonas aeruginosa colonization    Regular patient of Dr. Oliveira.  Presents today for problem visit.  I have taken care of her in the hospital several years ago.  Also near her son very well, Dr. Casa Shin.  He is a local neurologist.    She spends a lot of time in a motorized wheelchair.  She has been disabled by rheumatoid arthritis.    She complains of foul-smelling urine.  This has been going on for about 2 weeks.  Her caregiver has noted that her urine has been cloudy occasionally.  She has not noticed any gross hematuria.  She denies dysuria or increased frequency.  She is embarrassed by the smell and is worried about attending family [place] over the weekend if she continues to smell like this.  I do not detect an odor in the exam room.  She states that she bathed is normal today and that helped take care of some of the smell.  She does not note any vaginal discharge.    I called and discussed her case with Dr. Copeland with infectious disease.  He is familiar with the patient.  We both agreed that in the absence of fever, systemic symptoms, and urinary symptoms that we should withhold treatment for now.  I will go ahead and send a culture.    She could potentially be experiencing some vulvovaginal candidiasis, but does not complain of overt vaginal discharge or irritation.  Will give a single dose of fluconazole as that could help, but unlikely to cause a problem.    She understands and knows that she should visit the emergency department over the weekend if she develops fever or pain/worsening symptoms.    She also explained in the office today that she desperately does not want to go to the emergency department or being in the hospital at this time.  However, she does also understand that the only way to  treat her urine if we do end up believing she is infected is with IV antibiotics given her history of multidrug-resistant organisms.         Follow Up   Return for Next scheduled follow up.  Patient was given instructions and counseling regarding her condition or for health maintenance advice. Please see specific information pulled into the AVS if appropriate.      PHILL Angela DO       Electronically signed by MARKIE Angela DO, 12/22/23, 3:21 PM CST.

## 2023-12-25 LAB
BACTERIA UR CULT: NORMAL
BACTERIA UR CULT: NORMAL

## 2023-12-28 ENCOUNTER — TREATMENT (OUTPATIENT)
Dept: PHYSICAL THERAPY | Facility: CLINIC | Age: 68
End: 2023-12-28
Payer: MEDICARE

## 2023-12-28 DIAGNOSIS — F44.4 FUNCTIONAL NEUROLOGICAL SYMPTOM DISORDER WITH WEAKNESS OR PARALYSIS: Primary | ICD-10-CM

## 2023-12-28 PROCEDURE — 97110 THERAPEUTIC EXERCISES: CPT | Performed by: PHYSICAL THERAPIST

## 2023-12-28 NOTE — PROGRESS NOTES
Physical Therapy Treatment Note and 30 Day Progress Note  [address] [place] [address]    Patient: Tawny Shin                                                 Visit Date: 2023  :     1955    Referring practitioner:    Moises Oliveira MD  Date of Initial Visit:          Type: THERAPY  Noted: 2023    Patient seen for 47 sessions    Visit Diagnoses:    ICD-10-CM ICD-9-CM   1. Functional neurological symptom disorder with weakness or paralysis  F44.4 300.11     SUBJECTIVE     Subjective:She denies falls but has B hand pain as well as her knees      PAIN: 4/10         OBJECTIVE     Objective     Therapeutic Exercises    23458 Units Comments   MMT B LE     5 STS test     TUG x 2 trials      Stand for max tolerance x 2          Timed Minutes 45      LE MMT   HIP Strength L Strength R   flexion 5/5 4+/5   extension     ABD     ADD      IR     ER          KNEE     flexion 5/5 5/5   extension 5/5 4+/5         ANKLE     dorsiflexion 5/5 4-/5   plantarflexion     eversion     inversion     *pain     Therapy Education/Self Care 10047   Education offered today    [facility] [email or web address]      Ongoing HEP   Date: 10/12/2023  Prepared by: Maria R Hicks     Bed:  - Prone Knee Extension with Ankle Weight  - 2-3 x daily - 7 x weekly - 30-60 seconds hold  - Supine Bridge  - 2 x daily - 7 x weekly - 2 sets - 10 reps  - Supine March  - 1-2 x daily - 7 x weekly - 2 sets - 10 reps  - Supine Active Straight Leg Raise  - 1-2 x daily - 7 x weekly - 2 sets - 10 reps     Chair  - Seated Hamstring Stretch with Chair  - 1-2 x daily - 7 x weekly - 2 sets - 10 reps  - Belt Assisted Dorsiflexion   - 1-2 x daily - 7 x weekly - 2 sets - 10 reps  - Standing Bilateral Gastroc Stretch with Step  - 1-2 x daily - 7 x weekly - 2 sets - 10 reps  - Seated Marching with Opposite Shoulder Flexion  - 1-2 x daily - 7 x weekly - 2 sets - 10 reps  - Seated  Long Arc Quad  - 1-2 x daily - 7 x weekly - 2 sets - 10 reps  - Seated Gluteal Sets  - 1-2 x daily - 7 x weekly - 2 sets - 10 reps  - seated clamshells  - 1-2 x daily - 7 x weekly - 2 sets - 10 reps   Timed Minutes        Total Timed Treatment:     45   mins  Total Time of Visit:             45   mins         ASSESSMENT/PLAN     GOALS  Goals                                            Progress Note due by 1/27/2024                                                             Recert due by 2/28/2024   LTG by: 12 weeks Comments Date Status   Patient will report compliance with HEP.  Reinforced today 12/28 ongoing   Pt will be able to maintain static standing w/ 50/50 Wbing for >/=3 mins w/o UE support/a to improve capacity for ADL/IADLs 1:09.96  1:08.74 12/28 ongoing   Patient to perform TUG in </=2 mins w/ FWW without LOB for improved capacity for household ambulation. [place] 1:1:36.55  [place] 2: 1:45.73 12/28 ongoing   Pt will be able to perform 5x STS w/ 50/50 Wbing in </=2 mins for improved functional strength 12.65 sec but not likely full 50/50 12/28 Partially met;  ongoing   Improve R ankle DF to neutral 9/28/20023  3 deg from neutral PROM, no change   1 deg past neutral PROM  10/31 MET   Improve gross B LE strength to >/=4/5 See table  12/28  ongoing       Assessment/Plan     ASSESSMENT:   She had some improvement in her LE strength MMT results and her 5x STS times were improved. She was inhibited by pain and fatigue more today. I did give rest breaks with regularity today.     PLAN:   Consider utilizing the Neurocom for 50/50 WB'ing, also consider working on turning and transfer techniques.    SIGNATURE: Kentrell Lamb, [place], [place] License #: [id number]  Electronically Signed on 12/28/2023        [facility] [place] [address]  [phone]

## 2024-01-01 NOTE — PROGRESS NOTES
Progress Note Addendum      Patient: Tawny Shin           : 1955  Visit Date: 2023  Referring practitioner: Moises Oliveira MD  Date of Initial Visit: Type: THERAPY  Noted: 2023  Patient seen for 47 sessions  Visit Diagnoses:    ICD-10-CM ICD-9-CM   1. Functional neurological symptom disorder with weakness or paralysis  F44.4 300.11          Clinical Progress: improved  Home Program Compliance: Yes  Progress toward previous goals: Partially Met  Prognosis to achieve goals: good    Objective     Assessment & Plan       Assessment  Impairments: abnormal coordination, abnormal gait, abnormal muscle firing, abnormal muscle tone, abnormal or restricted ROM, activity intolerance, impaired balance, impaired physical strength, lacks appropriate home exercise program, pain with function, safety issue and weight-bearing intolerance   Functional limitations: carrying objects, lifting, sleeping, walking, pulling, pushing, uncomfortable because of pain, moving in bed, sitting, standing, stooping, reaching behind back, reaching overhead and unable to perform repetitive tasks   Prognosis: good    Plan  Therapy options: will be seen for skilled therapy services  Planned modality interventions: thermotherapy (hydrocollator packs), cryotherapy, low level laser therapy, TENS, dry needling, electrical stimulation/[place]iss stimulation and ultrasound  Planned therapy interventions: abdominal trunk stabilization, manual therapy, ADL retraining, motor coordination training, balance/weight-bearing training, neuromuscular re-education, body mechanics training, postural training, soft tissue mobilization, spinal/joint mobilization, fine motor coordination training, flexibility, functional ROM exercises, strengthening, gait training, stretching, home exercise program, therapeutic activities, IADL retraining, joint mobilization and transfer training  Frequency: 2x week  Duration in weeks: 12  Treatment plan discussed  with: PTA        I reviewed the treatment and goals with Kentrell Lamb PTA and agree with the POC.    SIGNATURE: Gibran Pritchard PT, License #: [id number]  Electronically Signed on 1/1/2024

## 2024-01-02 ENCOUNTER — TREATMENT (OUTPATIENT)
Dept: PHYSICAL THERAPY | Facility: CLINIC | Age: 69
End: 2024-01-02
Payer: MEDICARE

## 2024-01-02 DIAGNOSIS — G82.20 LOWER PARAPLEGIA: ICD-10-CM

## 2024-01-02 DIAGNOSIS — F44.4 FUNCTIONAL NEUROLOGICAL SYMPTOM DISORDER WITH WEAKNESS OR PARALYSIS: Primary | ICD-10-CM

## 2024-01-02 PROCEDURE — 97140 MANUAL THERAPY 1/> REGIONS: CPT | Performed by: PHYSICAL THERAPIST

## 2024-01-02 NOTE — PROGRESS NOTES
Physical Therapy Treatment Note  [address] [place] [address]    Patient: Tawny Shin                                                 Visit Date: 2024  :     1955    Referring practitioner:    No ref. provider found  Date of Initial Visit:          Type: THERAPY  Noted: 2023    Patient seen for 48 sessions    Visit Diagnoses:    ICD-10-CM ICD-9-CM   1. Functional neurological symptom disorder with weakness or paralysis  F44.4 300.11   2. Lower paraplegia  G82.20 344.1     SUBJECTIVE     Subjective:She had 3 [place] gatherings and reports being very tired.       PAIN: 4/10         OBJECTIVE     Objective     Manual Therapy     47035  Comments   STM B hamstring complex and gastroc with Power boost L1 bulb     Manual B pec and thoracic stretches in sitting with 1/2 roller    B TKE and DF stretches with pink bolster            Timed Minutes 43              Therapy Education/Self Care 81477   Education offered today    [facility] [id number]       Ongoing HEP   [id number]      Date: 10/12/2023  Prepared by: Maria R Hicks     Bed:  - Prone Knee Extension with Ankle Weight  - 2-3 x daily - 7 x weekly - 30-60 seconds hold  - Supine Bridge  - 2 x daily - 7 x weekly - 2 sets - 10 reps  - Supine March  - 1-2 x daily - 7 x weekly - 2 sets - 10 reps  - Supine Active Straight Leg Raise  - 1-2 x daily - 7 x weekly - 2 sets - 10 reps     Chair  - Seated Hamstring Stretch with Chair  - 1-2 x daily - 7 x weekly - 2 sets - 10 reps  - Belt Assisted Dorsiflexion   - 1-2 x daily - 7 x weekly - 2 sets - 10 reps  - Standing Bilateral Gastroc Stretch with Step  - 1-2 x daily - 7 x weekly - 2 sets - 10 reps  - Seated Marching with Opposite Shoulder Flexion  - 1-2 x daily - 7 x weekly - 2 sets - 10 reps  - Seated Long Arc Quad  - 1-2 x daily - 7 x weekly - 2 sets - 10 reps  - Seated Gluteal Sets  - 1-2 x daily - 7 x weekly - 2 sets - 10  reps  - seated clamshells  - 1-2 x daily - 7 x weekly - 2 sets - 10 reps   Timed Minutes        Total Timed Treatment:     43   mins  Total Time of Visit:             43   mins         ASSESSMENT/PLAN     GOALS  Goals                                            Progress Note due by 1/27/2024                                                             Recert due by 2/28/2024   LTG by: 12 weeks Comments Date Status   Patient will report compliance with HEP.  Reinforced today 12/28 ongoing   Pt will be able to maintain static standing w/ 50/50 Wbing for >/=3 mins w/o UE support/a to improve capacity for ADL/IADLs 1:09.96  1:08.74 12/28 ongoing   Patient to perform TUG in </=2 mins w/ FWW without LOB for improved capacity for household ambulation. [place] 1:1:36.55  [place] 2: 1:45.73 12/28 ongoing   Pt will be able to perform 5x STS w/ 50/50 Wbing in </=2 mins for improved functional strength 12.65 sec but not likely full 50/50 12/28 Partially met;  ongoing   Improve R ankle DF to neutral 9/28/20023  3 deg from neutral PROM, no change   1 deg past neutral PROM  10/31 MET   Improve gross B LE strength to >/=4/5 See table  12/28  Met       Assessment/Plan     ASSESSMENT:   We didn't have an appropriate walker available so we had to change plans a bit.    PLAN:   Consider utilizing the Neurocom for 50/50 WB'ing, also consider working on turning and transfer techniques.       SIGNATURE: Kentrell Lamb PTA, [place] License #: [id number]  Electronically Signed on 1/2/2024        [address]  [place] [address]  [phone]

## 2024-01-04 ENCOUNTER — TREATMENT (OUTPATIENT)
Dept: PHYSICAL THERAPY | Facility: CLINIC | Age: 69
End: 2024-01-04
Payer: MEDICARE

## 2024-01-04 DIAGNOSIS — F44.4 FUNCTIONAL NEUROLOGICAL SYMPTOM DISORDER WITH WEAKNESS OR PARALYSIS: Primary | ICD-10-CM

## 2024-01-04 DIAGNOSIS — G82.20 LOWER PARAPLEGIA: ICD-10-CM

## 2024-01-04 NOTE — PROGRESS NOTES
Physical Therapy Treatment Note  [address] [place] [address]    Patient: Tawny Shin                                                 Visit Date: 2024  :     1955    Referring practitioner:    Moises Oliveira MD  Date of Initial Visit:          Type: THERAPY  Noted: 2023    Patient seen for 49 sessions    Visit Diagnoses:    ICD-10-CM ICD-9-CM   1. Functional neurological symptom disorder with weakness or paralysis  F44.4 300.11   2. Lower paraplegia  G82.20 344.1     SUBJECTIVE     Subjective: No new c/c, falls or near falls.     PAIN: 3/10 (B hands, shoulder, knees)     OBJECTIVE     Objective     Manual Therapy     10283  Comments   Percussive IASTM using Powerboost to B hamstrings > gluteals at L2 using ball attachment SL, pillow between knees   Passive R ankle DF stretches  Seated with R LE extended        Timed Minutes 30     Neuromuscular Reeducation     90600 Comments   M/L WS on neurocom  Average 70% L LE WB, 50% R LE WB  Best: 75% L LE WB, 62% R LE WB       Timed Minutes 15     Therapy Education/Self Care 43322   Education offered today    [facility] Code [email or web address]       Ongoing HEP   [id number]      Date: 10/12/2023  Prepared by: Maria R Hicks     Bed:  - Prone Knee Extension with Ankle Weight  - 2-3 x daily - 7 x weekly - 30-60 seconds hold  - Supine Bridge  - 2 x daily - 7 x weekly - 2 sets - 10 reps  - Supine March  - 1-2 x daily - 7 x weekly - 2 sets - 10 reps  - Supine Active Straight Leg Raise  - 1-2 x daily - 7 x weekly - 2 sets - 10 reps     Chair  - Seated Hamstring Stretch with Chair  - 1-2 x daily - 7 x weekly - 2 sets - 10 reps  - Belt Assisted Dorsiflexion   - 1-2 x daily - 7 x weekly - 2 sets - 10 reps  - Standing Bilateral Gastroc Stretch with Step  - 1-2 x daily - 7 x weekly - 2 sets - 10 reps  - Seated Marching with Opposite Shoulder Flexion  - 1-2 x daily - 7 x weekly - 2 sets - 10  reps  - Seated Long Arc Quad  - 1-2 x daily - 7 x weekly - 2 sets - 10 reps  - Seated Gluteal Sets  - 1-2 x daily - 7 x weekly - 2 sets - 10 reps  - seated clamshells  - 1-2 x daily - 7 x weekly - 2 sets - 10 reps   Timed Minutes      Total Timed Treatment:     43   mins  Total Time of Visit:             43   mins         ASSESSMENT/PLAN     GOALS  Goals                                            Progress Note due by 1/27/2024                                                             Recert due by 2/28/2024   LTG by: 12 weeks Comments Date Status   Patient will report compliance with HEP.  Reinforced today 12/28 ongoing   Pt will be able to maintain static standing w/ 50/50 Wbing for >/=3 mins w/o UE support/a to improve capacity for ADL/IADLs 1:09.96  1:08.74 12/28 ongoing   Patient to perform TUG in </=2 mins w/ FWW without LOB for improved capacity for household ambulation. [place] 1:1:36.55  [place] 2: 1:45.73 12/28 ongoing   Pt will be able to perform 5x STS w/ 50/50 Wbing in </=2 mins for improved functional strength 12.65 sec but not likely full 50/50 12/28 Partially met;  ongoing   Improve R ankle DF to neutral 9/28/20023  3 deg from neutral PROM, no change   1 deg past neutral PROM  10/31 MET   Improve gross B LE strength to >/=4/5 See table  12/28 MET     Assessment/Plan     ASSESSMENT: She reports good response to PowerBoost, therefore utilized this again today to address hamstring extensibility. She was able to achieve up to 62% R LE WB x1 today, though on average achieves 50%. She continues to be limited by R ankle ROM as well as increased WB through UE vs LE.     PLAN: consider working on turning and transfer techniques. Continue to address ankle mobility as well as gross LE flexibility.      SIGNATURE: Maria R Garay PTA, [place] License #: [id number]  Electronically Signed on 1/4/2024        [place] [address]  [phone]

## 2024-01-05 ENCOUNTER — TELEPHONE (OUTPATIENT)
Age: 69
End: 2024-01-05
Payer: MEDICARE

## 2024-01-05 NOTE — TELEPHONE ENCOUNTER
Called and left VM for pt to return my call regarding  needing to reschedule her 02/12/2024 appt.

## 2024-01-09 ENCOUNTER — TREATMENT (OUTPATIENT)
Dept: PHYSICAL THERAPY | Facility: CLINIC | Age: 69
End: 2024-01-09
Payer: MEDICARE

## 2024-01-09 ENCOUNTER — OFFICE VISIT (OUTPATIENT)
Dept: INTERNAL MEDICINE | Facility: CLINIC | Age: 69
End: 2024-01-09
Payer: MEDICARE

## 2024-01-09 VITALS
HEART RATE: 76 BPM | TEMPERATURE: 97.3 F | DIASTOLIC BLOOD PRESSURE: 78 MMHG | BODY MASS INDEX: 29.82 KG/M2 | HEIGHT: 67 IN | SYSTOLIC BLOOD PRESSURE: 116 MMHG | OXYGEN SATURATION: 95 % | WEIGHT: 190 LBS

## 2024-01-09 DIAGNOSIS — R82.90 FOUL SMELLING URINE: Primary | ICD-10-CM

## 2024-01-09 DIAGNOSIS — F44.4 FUNCTIONAL NEUROLOGICAL SYMPTOM DISORDER WITH WEAKNESS OR PARALYSIS: Primary | ICD-10-CM

## 2024-01-09 DIAGNOSIS — N77.1 VAGINITIS, VULVITIS AND VULVOVAGINITIS IN DISEASES CLASSIFIED ELSEWHERE: ICD-10-CM

## 2024-01-09 DIAGNOSIS — R10.31 RLQ ABDOMINAL PAIN: ICD-10-CM

## 2024-01-09 DIAGNOSIS — G82.20 LOWER PARAPLEGIA: ICD-10-CM

## 2024-01-09 DIAGNOSIS — R82.90 ABNORMAL URINE: ICD-10-CM

## 2024-01-09 LAB
BILIRUB BLD-MCNC: NEGATIVE MG/DL
CLARITY, POC: ABNORMAL
COLOR UR: ABNORMAL
GLUCOSE UR STRIP-MCNC: NEGATIVE MG/DL
KETONES UR QL: NEGATIVE
LEUKOCYTE EST, POC: ABNORMAL
NITRITE UR-MCNC: NEGATIVE MG/ML
PH UR: 8.5 [PH] (ref 5–8)
PROT UR STRIP-MCNC: ABNORMAL MG/DL
RBC # UR STRIP: ABNORMAL /UL
SP GR UR: 1.02 (ref 1–1.03)
UROBILINOGEN UR QL: ABNORMAL

## 2024-01-09 PROCEDURE — 1159F MED LIST DOCD IN RCRD: CPT

## 2024-01-09 PROCEDURE — 97530 THERAPEUTIC ACTIVITIES: CPT | Performed by: PHYSICAL THERAPIST

## 2024-01-09 PROCEDURE — 81003 URINALYSIS AUTO W/O SCOPE: CPT

## 2024-01-09 PROCEDURE — 3078F DIAST BP <80 MM HG: CPT

## 2024-01-09 PROCEDURE — 3074F SYST BP LT 130 MM HG: CPT

## 2024-01-09 PROCEDURE — 1160F RVW MEDS BY RX/DR IN RCRD: CPT

## 2024-01-09 PROCEDURE — 99213 OFFICE O/P EST LOW 20 MIN: CPT

## 2024-01-09 PROCEDURE — 97110 THERAPEUTIC EXERCISES: CPT | Performed by: PHYSICAL THERAPIST

## 2024-01-09 NOTE — PROGRESS NOTES
Subjective   Tawny Shin is a 68 y.o. female.   Chief Complaint   Patient presents with    Foul Smelling Odor     Patient complains of foul smelling odor sx x 1 month.   Patient has experienced slight RLQ abdominal pain.   Denies back pain, burning with urination, pelvic discomfort, fever.   12/22/2023 patient was prescribed Diflucan.     Abscess      Patient states she has been seeing Dr. Copeland for sx located on the R hip, states she was prescribed an abx and is wanting to know if she is needed to continue on the abx.   Experiencing slight pain.  Denies bleeding, discharge.        History of Present Illness   Mrs. Shin presents to the office today with continued complaints of foul-smelling odor of urine, slight right lower quadrant abdominal discomfort.  She explains symptoms have been occurring now for about 4 weeks.  She was seen in the office on 12/22/2023 for similar complaints, workup at that time was unremarkable for UTI with normal urine culture, she was prescribed Diflucan.  She notes that for about 2 days after that she did notice a decreased intensity of the odor, she is not certain that this is a result of the Diflucan or because she increased her water intake.  She states the smell is something that bothers her because she is concerned other people can smell it, she states despite immaculate hygiene it is persistent.  She denies any urinary frequency, dysuria, flank pain, fever, chills, no diarrhea.  She does suffer from intermittent urinary fecal incontinence ever since injury of the spine.  She also mentions that she was hospitalized and treated for an abscess, had wound VAC in the right lower quadrant region, follows with infectious disease, had been on Keflex 4 times daily for an extended period time however has been off of this for about 4 weeks now.  She is not certain if she is supposed to be off of it or if it was something that was supposed to continue for longer period of time.  She  states that she tried to get the medication refilled at her pharmacy but it was refused.  She has not noticed any vaginal discharge, no bleeding.  She has had a couple of occurrences where she has noted a slight discomfort in the right lower quadrant, has not noted any superficial abnormality such as mass, erythema, discharge.  The following portions of the patient's history were reviewed and updated as appropriate: allergies, current medications, past family history, past medical history, past social history, past surgical history and problem list.    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    Elevated cholesterol     Eustachian tube dysfunction     Heart disease     Herpes simplex     History of transfusion     Hyperlipidemia     Hypertension     Hypothyroidism 12/23/2020    Intrinsic asthma 12/23/2020    Knee dislocation     Labral tear of right hip joint     Laryngitis sicca     Laryngitis, chronic     Left carotid bruit 03/09/2016    MI (myocardial infarction)     Myalgia due to statin 06/25/2019    Open wound of right hip 09/14/2021    Osteomyelitis of right femur 07/06/2021    Otorrhea     Pacemaker 11/17/2016    Primary osteoarthritis of left knee 12/23/2020    Psoriasis vulgaris 12/23/2020    S/P coronary artery stent placement 03/09/2016    Sensorineural hearing loss     Seropositive rheumatoid arthritis of multiple sites 12/27/2019    Overview:   "-myochrysine '93-'96 -methotrexate '96--->11/98;r/s  restarted 2/99--> 8/14 (anemia) -sulfasalazine- not effective -penicillamine 6/98-->10/98; no effect -leflunomide 11/98--> - Humira '13-->didn't take - Enbrel 12/14-->3/15- no effect!   Last Assessment & Plan:  - \"aching all over\" because she had to be off her anti-rheumatic drugs for 2 weeks in preparation for her R knee surgery - he    Sick sinus syndrome 12/27/2019    Sjogren's disease     Spondylolisthesis of lumbar region 01/17/2018    Syncope, recurrent 02/08/2021    Urinary tract infection       Past Surgical History:   Procedure Laterality Date    A-V CARDIAC PACEMAKER INSERTION  2016    ATRIAL CARDIAC PACEMAKER INSERTION      CARDIAC CATHETERIZATION      CATARACT EXTRACTION      CERVICAL CORPECTOMY N/A 3/3/2021    Procedure: CERVICAL 6 CORPECTOMY WITH TITANIUM CAGE WITH NEURO MONITORING;  Surgeon: Bandar Shea MD;  Location:  [facility];  Service: Neurosurgery;  Laterality: N/A;    COLONOSCOPY  11/08/2011    One fold in the ascending colon which showed ulcer otherwise normal exam    COLONOSCOPY  11/12/2004    Normal exam repeat in five years    CORONARY ANGIOPLASTY WITH STENT PLACEMENT      X 2; 2013 & 2014    ENDOSCOPY  07/10/2014    Normal exam    FLAP LEG Right 9/14/2021    Procedure: RIGHT GLUTEAL FASCIOCUTANEOUS ADVANCEMENT FLAP AND RIGHT TENSOR FASCIAL JESSICA FLAP;  Surgeon: Amadeo Turner MD;  Location:  [facility];  Service: Plastics;  Laterality: Right;    HIP ABDUCTION TENOTOMY BILATERAL Right 1/14/2021    Procedure: RIGHT HIP GLUTEUS MEDLUS / MINIMUS REPAIR, POSSIBLE ACHILLES ALLOGRAFT;  Surgeon: Nino Carlson MD;  Location:  [facility];  Service: Orthopedics;  Laterality: Right;    INCISION AND DRAINAGE ABSCESS Right 6/4/2022    Procedure: INCISION AND DRAINAGE ABSCESS right hip;  Surgeon: Magda Salcido MD;  Location:  [facility];  Service: General;  Laterality: Right;    INCISION AND DRAINAGE ABSCESS Right 6/10/2022    Procedure: " RIGHT HIP INCISION AND DRAINAGE. MD NEEDS 3L VANC IRRIGATION, CURRETTES, DAICANS, KERLEX ROLLS;  Surgeon: Amadeo Turner MD;  Location:  [facility];  Service: Plastics;  Laterality: Right;    INCISION AND DRAINAGE HIP Right 2/9/2021    Procedure: HIP INCISION AND DRAINAGE;  Surgeon: Nino Carlson MD;  Location:  [facility];  Service: Orthopedics;  Laterality: Right;    INCISION AND DRAINAGE LEG Right 10/24/2021    Procedure: INCISION AND DRAINAGE LOWER EXTREMITY;  Surgeon: Amadeo Turner MD;  Location:  [facility];  Service: Plastics;  Laterality: Right;    INCISION AND DRAINAGE OF WOUND Right 7/8/2021    Procedure: INCISION AND DRAINAGE WOUND RIGHT HIP;  Surgeon: James Huntley MD;  Location:  [facility];  Service: Orthopedics;  Laterality: Right;    JOINT REPLACEMENT      KYPHOPLASTY WITH BIOPSY Bilateral 10/26/2021    Procedure: THOARCIC 12 KYPHOPLASTY WITH BIOPSY;  Surgeon: Bandar Shea MD;  Location:  [facility];  Service: Neurosurgery;  Laterality: Bilateral;    LEG DEBRIDEMENT Right 9/14/2021    Procedure: DEBRIDEMENT OF RIGHT HIP WOUND, RIGHT GLUTEAL FASCIOCUTANEOUS ADVANCEMENT FLAP AND RIGHT TENSOR FASCIAL JESSICA FLAP;  Surgeon: Amadeo Turner MD;  Location:  [facility];  Service: Plastics;  Laterality: Right;    LUMBAR DISCECTOMY Right 3/23/2021    Procedure: LUMBAR DISCECTOMY MICRO, Lumbar 1/2 right;  Surgeon: Bandar Shea MD;  Location:  [facility];  Service: Neurosurgery;  Laterality: Right;    LUMBAR FUSION N/A 1/19/2018    Procedure: L3-4,L4-5 DECOMPRESSION, POSTERIOR SPINAL FUSION WITH INSTRUMENTATION;  Surgeon: Fortino Oropeza MD;  Location:  [facility];  Service:     LUMBAR LAMINECTOMY WITH FUSION Left 1/17/2018    Procedure: LEFT L3-4 L4-5 LATERAL LUMBAR INTERBODY FUSION;  Surgeon: Fortino Oropeza MD;  Location:  [facility];  Service:     MYRINGOTOMY W/ TUBES  09/04/2014    TUBES NO LONGER IN PLACE    OTHER SURGICAL HISTORY      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 so  hardware was removed and spacers were placed    REPLACEMENT TOTAL KNEE Right         Current Outpatient Medications:     acetaminophen (TYLENOL) 325 MG tablet, Take 2 tablets by mouth Every 6 (Six) Hours As Needed (mild pain). Indications: Fever, Pain, Disp: , Rfl:     apixaban (ELIQUIS) 5 MG tablet tablet, Take 1 tablet by mouth 2 (Two) Times a Day. Indications: Other - full anticoagulation, Disp: 180 tablet, Rfl: 3    aspirin 81 MG EC tablet, Take 1 tablet by mouth Daily. Indications: Disease involving Lipid Deposits in the Arteries, Disp: , Rfl:     carvedilol (COREG) 25 MG tablet, Take 1 tablet by mouth 2 (Two) Times a Day With Meals. Indications: Heart Attack, Disp: 180 tablet, Rfl: 3    Diclofenac Sodium (VOLTAREN) 1 % gel gel, Apply 4 g topically to the appropriate area as directed 4 (Four) Times a Day As Needed. Indications: Joint Damage causing Pain and Loss of Function, Disp: , Rfl:     DULoxetine (CYMBALTA) 60 MG capsule, Take 1 capsule by mouth Daily. Indications: Major Depressive Disorder, Disp: 90 capsule, Rfl: 3    famotidine (PEPCID) 40 MG tablet, Take 1 tablet by mouth Daily. Indications: Heartburn, Disp: 90 tablet, Rfl: 3    ferrous sulfate 324 (65 Fe) MG tablet delayed-release EC tablet, Take 1 tablet by mouth Daily With Breakfast. Indications: Iron Deficiency, Disp: 90 tablet, Rfl: 3    fluticasone (FLONASE) 50 MCG/ACT nasal spray, 1 spray into the nostril(s) as directed by provider Daily., Disp: 18.2 mL, Rfl: 5    folic acid (FOLVITE) 1 MG tablet, Take 1 tablet by mouth Daily. Indications: Anemia From Inadequate Folic Acid, Disp: 90 tablet, Rfl: 3    furosemide (LASIX) 40 MG tablet, Take 1 tablet by mouth Daily. Indications: Edema, Disp: 90 tablet, Rfl: 3    isosorbide mononitrate (IMDUR) 60 MG 24 hr tablet, Take 1 tablet by mouth Every Morning. Indications: Stable Angina Pectoris, Disp: 90 tablet, Rfl: 3    leflunomide (ARAVA) 20 MG tablet, Take 1 tablet by mouth every night at bedtime.  Indications: Rheumatoid Arthritis, Disp: 90 tablet, Rfl: 3    levothyroxine (SYNTHROID, LEVOTHROID) 75 MCG tablet, Take 1 tablet by mouth Daily. Indications: Underactive Thyroid, Disp: 90 tablet, Rfl: 3    Lidocaine 4 % patch, Apply  topically every night at bedtime. To lower back  Indications: Arthritis Related to an Inflammatory Disorder, Disp: , Rfl:     losartan (COZAAR) 50 MG tablet, TAKE 1 TABLET BY MOUTH DAILY, Disp: 90 tablet, Rfl: 3    multivitamin with minerals tablet tablet, Take 1 tablet by mouth Daily., Disp:  , Rfl:     predniSONE (DELTASONE) 5 MG tablet, Take 1 tablet by mouth Daily. Indications: Acute Bursitis (Patient taking differently: Take 1 mg by mouth Daily. Indications: Acute Bursitis), Disp: 90 tablet, Rfl: 3    salsalate (DISALCID) 500 MG tablet, TAKE 5 TABLETS BY MOUTH ON MONDAY, WEDNESDAY, AND FRIDAY AND TAKE 4 TABLETS BY MOUTH ON TUESDAY, THURSDAY, AND SATURDAY, Disp: 384 tablet, Rfl: 3    sucralfate (CARAFATE) 1 g tablet, TAKE ONE TABLET BY MOUTH FOUR TIMES A DAY BEFORE MEALS AND AT BEDTIME, Disp: 360 tablet, Rfl: 3    TiZANidine (Zanaflex) 2 MG capsule, Take 1 capsule by mouth 2 (Two) Times a Day As Needed for Muscle Spasms., Disp: 20 capsule, Rfl: 0    traMADol (ULTRAM) 50 MG tablet, Take 1 tablet by mouth Every 6 (Six) Hours As Needed for Moderate Pain., Disp: , Rfl:     valACYclovir (VALTREX) 500 MG tablet, Take 1 tablet by mouth Daily. Indications: Shingles, Disp: 90 tablet, Rfl: 3    Vibegron 75 MG tablet, Take 1 tablet by mouth Daily., Disp: 30 tablet, Rfl: 11    magnesium hydroxide (MILK OF MAGNESIA) 400 MG/5ML suspension, Take 30 mL by mouth Daily As Needed for Constipation. Indications: Constipation (Patient not taking: Reported on 1/9/2024), Disp: , Rfl:     Menthol-Zinc Oxide 0.45-20 % ointment, Apply 1 application  topically 2 (Two) Times a Day. Apply to right buttock (Patient not taking: Reported on 1/9/2024), Disp: , Rfl:     nitroglycerin (Nitrostat) 0.4 MG SL tablet,  "Place 1 tablet under the tongue Every 5 (Five) Minutes As Needed for Chest Pain. Take no more than 3 doses in 15 minutes. (Patient not taking: Reported on 1/9/2024), Disp: , Rfl: 12    polyethylene glycol (MIRALAX) 17 GM/SCOOP powder, Take 17 g by mouth Daily. Indications: Constipation (Patient not taking: Reported on 1/9/2024), Disp: , Rfl:       /78 (BP Location: Left arm, Patient Position: Sitting, Cuff Size: Adult)   Pulse 76   Temp 97.3 °F (36.3 °C) (Temporal)   Ht 170.2 cm (67\")   Wt 86.2 kg (190 lb)   LMP  (LMP Unknown)   SpO2 95%   BMI 29.76 kg/m²      Body mass index is 29.76 kg/m².          Physical Exam  Vitals and nursing note reviewed. Exam conducted with a chaperone present.   Constitutional:       General: She is not in acute distress.     Appearance: Normal appearance. She is not ill-appearing, toxic-appearing or diaphoretic.   HENT:      Head: Normocephalic and atraumatic.   Eyes:      Extraocular Movements: Extraocular movements intact.      Conjunctiva/sclera: Conjunctivae normal.      Pupils: Pupils are equal, round, and reactive to light.   Cardiovascular:      Rate and Rhythm: Normal rate and regular rhythm.      Pulses: Normal pulses.      Heart sounds: Normal heart sounds.   Pulmonary:      Effort: Pulmonary effort is normal.      Breath sounds: Normal breath sounds.   Abdominal:      General: There is no distension.      Palpations: There is no mass.      Tenderness: There is abdominal tenderness (with deep palpation to RLQ). There is no right CVA tenderness, left CVA tenderness, guarding or rebound.   Genitourinary:     Labia:         Right: No rash, tenderness, lesion or injury.         Left: No rash, tenderness, lesion or injury.    Musculoskeletal:      Cervical back: Normal range of motion and neck supple.   Skin:     General: Skin is warm and dry.   Neurological:      General: No focal deficit present.      Mental Status: She is alert and oriented to person, place, and " time. Mental status is at baseline.      Gait: Gait abnormal (at baseline, in wheelchair).   Psychiatric:         Mood and Affect: Mood normal.         Behavior: Behavior normal.         Thought Content: Thought content normal.         Judgment: Judgment normal.               Assessment & Plan   Diagnoses and all orders for this visit:    1. Foul smelling urine (Primary)  -     NuSwab VG, Candida 6sp - Swab, Vagina    2. Abnormal urine  -     POC Urinalysis Dipstick, Multipro  -     Urine Culture - Urine, Urine, Clean Catch    3. Vaginitis, vulvitis and vulvovaginitis in diseases classified elsewhere  -     NuSwab VG, Candida 6sp - Swab, Vagina    4. RLQ abdominal pain                 She was seen not too long ago, 12/22/2023 with complaints of foul-smelling urine, at that time Dr. Angela who saw her discussed her case with Dr. 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  She was treated with Diflucan for probable yeast infection.  She reports feeling like her symptom (foul smell) did alleviate for couple days after the Diflucan or as she mention it may have been as result of increased water intake.  She is drinking an average of 64-70 ounces of water daily currently which I feel like is appropriate.  She does not consume excessive amounts of protein.  Would also consider the fact that some of her medications could be causing the strong odor.  Offered to do a vaginal swab to assess further for potential vaginal yeast or bacterial overgrowth -she has been on extensive antibiotic therapy up until 4 weeks ago.  I did advise that  she call ID to clarify as to whether or not she has completed the Keflex.  On examination of right lower quadrant today on deep palpation she did have some discomfort, no mass, swelling, erythema noted.  She lacks other s/s that would increase suspicion of current infection over than symptoms mentioned.  I advised that we proceed with doing urine culture, the vaginal swab and then determine changes to plan of care thereafter.  She is in agreement this.  Her urine today did present with similar findings to the one collected several weeks ago that resulted within normal culture.  She has had persistent hematuria, she does follow with urology for management of neurogenic bladder -is on Vibegron -appears to have started this back in early November - does not have a known listed side effect of strong urinary odor.         Please note that portions of this note were completed with a voice recognition program.     Electronically signed by DANIELA Gonzalez, 01/09/24, 11:24 CST.

## 2024-01-09 NOTE — PROGRESS NOTES
Physical Therapy Treatment Note  [address] [place] [address]    Patient: Tawny Shin                                                 Visit Date: 2024  :     1955    Referring practitioner:    Moises Oliveira MD  Date of Initial Visit:          Type: THERAPY  Noted: 2023    Patient seen for 50 sessions    Visit Diagnoses:    ICD-10-CM ICD-9-CM   1. Functional neurological symptom disorder with weakness or paralysis  F44.4 300.11   2. Lower paraplegia  G82.20 344.1     SUBJECTIVE     Subjective:She's having a problem with her chair it is raised and she can't       PAIN: 3/10         OBJECTIVE     Objective       Therapeutic Activities    44066 Comments   SB transfer WC<> mat table    Standing for max tolerance with Rwx x 3 reps See goals section               Timed Minutes 15      Therapeutic Exercises    29849 Units Comments   B unilateal DF and hamstring stretches in sitting     Seated A<>P trunk rollouts with 45 cm SB w/o LE support x 15     Seated B lateral trunk rollouts with 45 cm SB w/o LE support x 10               Timed Minutes 25      Therapy Education/Self Care 13312   Education offered today    [facility]    Ongoing [facility]   [id number]      Date: 10/12/2023  Prepared by: Maria R Hicks     Bed:  - Prone Knee Extension with Ankle Weight  - 2-3 x daily - 7 x weekly - 30-60 seconds hold  - Supine Bridge  - 2 x daily - 7 x weekly - 2 sets - 10 reps  - Supine March  - 1-2 x daily - 7 x weekly - 2 sets - 10 reps  - Supine Active Straight Leg Raise  - 1-2 x daily - 7 x weekly - 2 sets - 10 reps     Chair  - Seated Hamstring Stretch with Chair  - 1-2 x daily - 7 x weekly - 2 sets - 10 reps  - Belt Assisted Dorsiflexion   - 1-2 x daily - 7 x weekly - 2 sets - 10 reps  - Standing Bilateral Gastroc Stretch with Step  - 1-2 x daily - 7 x weekly - 2 sets - 10 reps  - Seated Marching with Opposite Shoulder Flexion   - 1-2 x daily - 7 x weekly - 2 sets - 10 reps  - Seated Long Arc Quad  - 1-2 x daily - 7 x weekly - 2 sets - 10 reps  - Seated Gluteal Sets  - 1-2 x daily - 7 x weekly - 2 sets - 10 reps  - seated clamshells  - 1-2 x daily - 7 x weekly - 2 sets - 10 reps   Timed Minutes        Total Timed Treatment:     40   mins  Total Time of Visit:             40   mins         ASSESSMENT/PLAN     GOALS  Goals                                            Progress Note due by 1/27/2024                                                             Recert due by 2/28/2024   LTG by: 12 weeks Comments Date Status   Patient will report compliance with HEP.  Reinforced today 12/28 ongoing   Pt will be able to maintain static standing w/ 50/50 Wbing for >/=3 mins w/o UE support/a to improve capacity for ADL/IADLs 1.11.75  :35.23  :53.49 1/9 ongoing   Patient to perform TUG in </=2 mins w/ FWW without LOB for improved capacity for household ambulation. River 1:1:36.55  River 2: 1:45.73 12/28 ongoing   Pt will be able to perform 5x STS w/ 50/50 Wbing in </=2 mins for improved functional strength 12.65 sec but not likely full 50/50 12/28 Partially met;  ongoing   Improve R ankle DF to neutral 9/28/20023  3 deg from neutral PROM, no change   1 deg past neutral PROM  10/31 MET   Improve gross B LE strength to >/=4/5 See table  12/28 MET       Assessment/Plan     ASSESSMENT:   With her chair stuck in the elevated position it changed my original plan of walking for distance as I think following her with our WC isn't a good option as it is a little too low.    PLAN:   Consider walking for overall distance tolerance x 3 to 4 reps.    SIGNATURE: Kentrell Lamb PTA, [place] License #: [id number]  Electronically Signed on 1/9/2024        [address] [place] [address]. [id number]  [phone]

## 2024-01-11 ENCOUNTER — TREATMENT (OUTPATIENT)
Dept: PHYSICAL THERAPY | Facility: CLINIC | Age: 69
End: 2024-01-11
Payer: MEDICARE

## 2024-01-11 DIAGNOSIS — F44.4 FUNCTIONAL NEUROLOGICAL SYMPTOM DISORDER WITH WEAKNESS OR PARALYSIS: Primary | ICD-10-CM

## 2024-01-11 DIAGNOSIS — G82.20 LOWER PARAPLEGIA: ICD-10-CM

## 2024-01-11 NOTE — PROGRESS NOTES
Physical Therapy Treatment Note  [address] [place] [address]    Patient: Tawny Shin                                                 Visit Date: 2024  :     1955    Referring practitioner:    Moises Oliveira MD  Date of Initial Visit:          Type: THERAPY  Noted: 2023    Patient seen for 51 sessions    Visit Diagnoses:    ICD-10-CM ICD-9-CM   1. Functional neurological symptom disorder with weakness or paralysis  F44.4 300.11   2. Lower paraplegia  G82.20 344.1     SUBJECTIVE     Subjective: She reports the piece of her chair came out and they couldn't figure it out. Reports her back is really hurting, she thinks it's from leaning around the piece of her chair. She reports she is wearing a patch.     PAIN: 5/10     OBJECTIVE     Objective     Therapeutic Activities    32617 Comments   Adjust w/c to ensure safety -- replace missing piece and educ on proper application    W/C <> table with FWW CGA-SBA   Sit <> stand t/f CGA - Jose R (x1 occurrence)           Timed Minutes 8     Manual Therapy     45302  Comments   Percussive IASTM using Powerboost to B gluteals, hamstrings, proximal gastroc at L2 using ball attachment SL with MHP to lumbar   Passive knee ext stretch with man OP  Supine, sustained   R ankle DF stretch passively             Timed Minutes 22      Gait Training          69292   Task/Terrain Asst AD Comments   Amb with FWW CGA FWW X4 ft, x10 ft               Timed Minutes 8      Therapy Education/Self Care 20870   Education offered today    [facility] [id number]   Ongoing HEP   [id number]      Date: 10/12/2023  Prepared by: Maria R Hicks     Bed:  - Prone Knee Extension with Ankle Weight  - 2-3 x daily - 7 x weekly - 30-60 seconds hold  - Supine Bridge  - 2 x daily - 7 x weekly - 2 sets - 10 reps  - Supine March  - -2 x daily - 7 x weekly - 2 sets - 10 reps  - Supine Active Straight Leg Raise  -  1-2 x daily - 7 x weekly - 2 sets - 10 reps     Chair  - Seated Hamstring Stretch with Chair  - 1-2 x daily - 7 x weekly - 2 sets - 10 reps  - Belt Assisted Dorsiflexion   - 1-2 x daily - 7 x weekly - 2 sets - 10 reps  - Standing Bilateral Gastroc Stretch with Step  - 1-2 x daily - 7 x weekly - 2 sets - 10 reps  - Seated Marching with Opposite Shoulder Flexion  - 1-2 x daily - 7 x weekly - 2 sets - 10 reps  - Seated Long Arc Quad  - 1-2 x daily - 7 x weekly - 2 sets - 10 reps  - Seated Gluteal Sets  - 1-2 x daily - 7 x weekly - 2 sets - 10 reps  - seated clamshells  - 1-2 x daily - 7 x weekly - 2 sets - 10 reps   Timed Minutes      Total Timed Treatment:     38   mins  Total Time of Visit:             40   mins         ASSESSMENT/PLAN     GOALS  Goals                                            Progress Note due by 1/27/2024                                                             Recert due by 2/28/2024   LTG by: 12 weeks Comments Date Status   Patient will report compliance with HEP.  Reinforced today 12/28 ongoing   Pt will be able to maintain static standing w/ 50/50 Wbing for >/=3 mins w/o UE support/a to improve capacity for ADL/IADLs 1.11.75  :35.23  :53.49 1/9 ongoing   Patient to perform TUG in </=2 mins w/ FWW without LOB for improved capacity for household ambulation. [place] 1:1:36.55  [place] 2: 1:45.73 12/28 ongoing   Pt will be able to perform 5x STS w/ 50/50 Wbing in </=2 mins for improved functional strength 12.65 sec but not likely full 50/50 12/28 Partially met;  ongoing   Improve R ankle DF to neutral 9/28/20023  3 deg from neutral PROM, no change   1 deg past neutral PROM  10/31 MET   Improve gross B LE strength to >/=4/5 See table  12/28 MET       Assessment/Plan     ASSESSMENT: Initially planned to walk for distance today, though pt c/o increased LBP as a result of poor prolonged sitting position from w/c piece being maladjusted. We did attempt walking for distance, though pt only able to  ambulated x4 ft and x10 feet before requiring seated rest break secondary to increased LBP. As a result, addressed mm extensibility in posterior LE. She did report pain around posterior knee and gastroc, though reports she has noticed improvement since beginning use of powerboost and prioritizing mobility.     PLAN: Consider walking for overall distance tolerance x3-4 reps as well as potentially use of vibraflex. May consider standing for endurance, time permitting. Continue use of powerboost to posterior LE.     SIGNATURE: Maria R Garay PTA, [place] License #: [id number]  Electronically Signed on 1/11/2024        [address] [place] [address]. [address]  [phone]

## 2024-01-12 LAB
BACTERIA UR CULT: ABNORMAL
BACTERIA UR CULT: ABNORMAL
OTHER ANTIBIOTIC SUSC ISLT: ABNORMAL

## 2024-01-13 LAB
A VAGINAE DNA VAG QL NAA+PROBE: NORMAL SCORE
BVAB2 DNA VAG QL NAA+PROBE: NORMAL SCORE
C ALBICANS DNA VAG QL NAA+PROBE: NEGATIVE
C GLABRATA DNA VAG QL NAA+PROBE: NEGATIVE
C KRUSEI DNA VAG QL NAA+PROBE: NEGATIVE
C LUSITANIAE DNA VAG QL NAA+PROBE: NEGATIVE
CANDIDA DNA VAG QL NAA+PROBE: NEGATIVE
MEGA1 DNA VAG QL NAA+PROBE: NORMAL SCORE
T VAGINALIS DNA VAG QL NAA+PROBE: NEGATIVE

## 2024-01-13 NOTE — PROGRESS NOTES
I called to discuss the results with her to see if symptoms had worsened or new symptoms had occurred since her visit, they had not, symptoms are the same, no fever, dysuria or increased frequency.  She also did inform me that a tiny amount of stool did get into the urine when she was leaving specimen, this being said I want to avoid unnecessary antibiotics.  We will continue to wait on the vaginal swab results and proceed from there.

## 2024-01-15 NOTE — PROGRESS NOTES
The vaginal swab came back unremarkable.  I recommend that we initiate a trial of boric acid vaginal suppositories rather than try Diflucan again, [facility], I would recommend trying a suppository every other day for 1 week - if it helps control symptoms may continue to use as needed.  Please emphasize that this is a suppository and is NOT oral. Continue with adequate water intake.  If urinary symptoms worsen/do not improve with this intervention please make us aware.  Not treating urine currently as it was contaminated, see above note.

## 2024-01-16 ENCOUNTER — TREATMENT (OUTPATIENT)
Dept: PHYSICAL THERAPY | Facility: CLINIC | Age: 69
End: 2024-01-16
Payer: MEDICARE

## 2024-01-16 DIAGNOSIS — G82.20 LOWER PARAPLEGIA: ICD-10-CM

## 2024-01-16 DIAGNOSIS — F44.4 FUNCTIONAL NEUROLOGICAL SYMPTOM DISORDER WITH WEAKNESS OR PARALYSIS: Primary | ICD-10-CM

## 2024-01-16 NOTE — PROGRESS NOTES
Physical Therapy Treatment Note  [address] [place] [address]    Patient: Tawny Shin                                                 Visit Date: 2024  :     1955    Referring practitioner:    Moises Oliveira MD  Date of Initial Visit:          Type: THERAPY  Noted: 2023    Patient seen for 52 sessions    Visit Diagnoses:    ICD-10-CM ICD-9-CM   1. Functional neurological symptom disorder with weakness or paralysis  F44.4 300.11   2. Lower paraplegia  G82.20 344.1     SUBJECTIVE     Subjective: She reports that her knees are hurting today. Reports L arm has been hurting in the shoulder and elbow. She reports she's been having a lot of acid reflux and stomach reflux. She's been having chest tightness. She has an apt with cardio on Friday.     PAIN: 3/10     OBJECTIVE     Objective     Therapeutic Exercises    45923 Units Comments   Seated thoracic/pec stretch with foam roller     Vibraflex, 10.0 Hz  50 sec before need to sit    Seated in w/c knee ext with gentle distraction/oscillations  Nearly neutral without c/o pain    R ankle DF passive stretch           Timed Minutes 25      Gait Training          14595   Task/Terrain Asst AD Comments   Amb with FWW CGA FWW 18 ft x3, 12 ft x1 -- 4 reps total   Amb with FWW  SBA FWW Neuro table > vibraflex         Timed Minutes 20      Therapy Education/Self Care 80512   Education offered today    [facility] Code [id number]   Ongoing HEP   [id number]      Date: 10/12/2023  Prepared by: Maria R Hikcs     Bed:  - Prone Knee Extension with Ankle Weight  - 2-3 x daily - 7 x weekly - 30-60 seconds hold  - Supine Bridge  - 2 x daily - 7 x weekly - 2 sets - 10 reps  - Supine March  - 1-2 x daily - 7 x weekly - 2 sets - 10 reps  - Supine Active Straight Leg Raise  - 1-2 x daily - 7 x weekly - 2 sets - 10 reps     Chair  - Seated Hamstring Stretch with Chair  - 1-2 x daily - 7 x weekly  - 2 sets - 10 reps  - Belt Assisted Dorsiflexion   - 1-2 x daily - 7 x weekly - 2 sets - 10 reps  - Standing Bilateral Gastroc Stretch with Step  - 1-2 x daily - 7 x weekly - 2 sets - 10 reps  - Seated Marching with Opposite Shoulder Flexion  - 1-2 x daily - 7 x weekly - 2 sets - 10 reps  - Seated Long Arc Quad  - 1-2 x daily - 7 x weekly - 2 sets - 10 reps  - Seated Gluteal Sets  - 1-2 x daily - 7 x weekly - 2 sets - 10 reps  - seated clamshells  - 1-2 x daily - 7 x weekly - 2 sets - 10 reps   Timed Minutes      Total Timed Treatment:     45   mins  Total Time of Visit:             45   mins         ASSESSMENT/PLAN     GOALS  Goals                                            Progress Note due by 1/27/2024                                                             Recert due by 2/28/2024   LTG by: 12 weeks Comments Date Status   Patient will report compliance with HEP.  Reinforced today 12/28 ongoing   Pt will be able to maintain static standing w/ 50/50 Wbing for >/=3 mins w/o UE support/a to improve capacity for ADL/IADLs 1.11.75  :35.23  :53.49 1/9 ongoing   Patient to perform TUG in </=2 mins w/ FWW without LOB for improved capacity for household ambulation. [place] 1:1:36.55  [place] 2: 1:45.73 12/28 ongoing   Pt will be able to perform 5x STS w/ 50/50 Wbing in </=2 mins for improved functional strength 12.65 sec but not likely full 50/50 12/28 Partially met;  ongoing   Improve R ankle DF to neutral 9/28/20023  3 deg from neutral PROM, no change   1 deg past neutral PROM  10/31 MET   Improve gross B LE strength to >/=4/5 See table  12/28 MET     Assessment/Plan     ASSESSMENT: Pt able to complete 18 ft x3 ambulation today with FWW, though completed a total of approx 76 ft today. Utilized vibraflex today, she was able to remain erect for 50 sec on 10.0 Hz before requiring seated rest break. While working to stretch R knee into ext, pt tolerated very gentle LAD while seated in chair and gentle manual OP.     PLAN:  Assess long term response to vibraflex, utilize again if indicated. May consider KT tape for edema in distal B LE. May consider standing for endurance, time permitting. Continue use of powerboost to posterior LE.     SIGNATURE: Maria R Garay [facility], [place] License #: [id number]  Electronically Signed on 1/16/2024        [address] [place] [address]  [phone]

## 2024-01-17 ENCOUNTER — TELEPHONE (OUTPATIENT)
Dept: UROLOGY | Facility: CLINIC | Age: 69
End: 2024-01-17
Payer: MEDICARE

## 2024-01-17 DIAGNOSIS — N31.9 NEUROGENIC BLADDER: ICD-10-CM

## 2024-01-18 ENCOUNTER — TREATMENT (OUTPATIENT)
Dept: PHYSICAL THERAPY | Facility: CLINIC | Age: 69
End: 2024-01-18
Payer: MEDICARE

## 2024-01-18 DIAGNOSIS — G82.20 LOWER PARAPLEGIA: ICD-10-CM

## 2024-01-18 DIAGNOSIS — F44.4 FUNCTIONAL NEUROLOGICAL SYMPTOM DISORDER WITH WEAKNESS OR PARALYSIS: Primary | ICD-10-CM

## 2024-01-18 NOTE — PROGRESS NOTES
"                                                                Physical Therapy Treatment Note  [address] [place] [address]    Patient: Tawny Shin                                                 Visit Date: 2024  :     1955    Referring practitioner:    Moises Oliveira MD  Date of Initial Visit:          Type: THERAPY  Noted: 2023    Patient seen for 53 sessions    Visit Diagnoses:    ICD-10-CM ICD-9-CM   1. Functional neurological symptom disorder with weakness or paralysis  F44.4 300.11   2. Lower paraplegia  G82.20 344.1     SUBJECTIVE     Subjective: She reports that something hurt after last time, but can't remember what it was \"so apparently it wasn't that bad.\" Reports she got her shot for RA prior to therapy today.     PAIN: 3/10     OBJECTIVE     Objective     Therapeutic Exercises    47377 Units Comments   Seated thoracic/pec stretch with half bolster 3 x 30s    Vibraflex, 10.0 Hz  60 sec   Standing for endurance   2:27 min    Applied sure prep and KT tape for edema x2 to B distal LE in octopus pattern           Timed Minutes 25     Neuromuscular Reeducation     65781 Comments   Standing to maintain midline on scale (x2)  120 lbs L 60 R    M/L WS on scales  Maintained 40 lbs WB R LE even with L LE attempt at FWB                Timed Minutes 10      Therapeutic Activities    21780 Comments   W/C t/f with FWW  SBA    Sit <> stand at vibraflex CGA   Sit <> stand throughout tx  CGA - Jose R   Step down from 2\" step leading with R LE Unable, immediately sit ea attempt (x3)       Timed Minutes 10     Therapy Education/Self Care 52524   Education offered today    [facility] Code [email or web address]   Ongoing HEP   [id number]      Date: 10/12/2023  Prepared by: Maria R Hicks     Bed:  - Prone Knee Extension with Ankle Weight  - 2-3 x daily - 7 x weekly - 30-60 seconds hold  - Supine Bridge  - 2 x daily - 7 x weekly - 2 sets - 10 reps  - Supine March  - 1-2 x daily - 7 x weekly - 2 " sets - 10 reps  - Supine Active Straight Leg Raise  - 1-2 x daily - 7 x weekly - 2 sets - 10 reps     Chair  - Seated Hamstring Stretch with Chair  - 1-2 x daily - 7 x weekly - 2 sets - 10 reps  - Belt Assisted Dorsiflexion   - 1-2 x daily - 7 x weekly - 2 sets - 10 reps  - Standing Bilateral Gastroc Stretch with Step  - 1-2 x daily - 7 x weekly - 2 sets - 10 reps  - Seated Marching with Opposite Shoulder Flexion  - 1-2 x daily - 7 x weekly - 2 sets - 10 reps  - Seated Long Arc Quad  - 1-2 x daily - 7 x weekly - 2 sets - 10 reps  - Seated Gluteal Sets  - 1-2 x daily - 7 x weekly - 2 sets - 10 reps  - seated clamshells  - 1-2 x daily - 7 x weekly - 2 sets - 10 reps   Timed Minutes      Total Timed Treatment:     45   mins  Total Time of Visit:             45   mins         ASSESSMENT/PLAN     GOALS  Goals                                            Progress Note due by 1/27/2024                                                             Recert due by 2/28/2024   LTG by: 12 weeks Comments Date Status   Patient will report compliance with HEP.  Reinforced today 12/28 ongoing   Pt will be able to maintain static standing w/ 50/50 Wbing for >/=3 mins w/o UE support/a to improve capacity for ADL/IADLs 1.11.75  :35.23  :53.49 1/9 ongoing   Patient to perform TUG in </=2 mins w/ FWW without LOB for improved capacity for household ambulation. [place] 1:1:36.55  [place] 2: 1:45.73 12/28 ongoing   Pt will be able to perform 5x STS w/ 50/50 Wbing in </=2 mins for improved functional strength 12.65 sec but not likely full 50/50 12/28 Partially met;  ongoing   Improve R ankle DF to neutral 9/28/20023  3 deg from neutral PROM, no change   1 deg past neutral PROM  10/31 MET   Improve gross B LE strength to >/=4/5 See table  12/28 MET     Assessment/Plan     ASSESSMENT: Pt able to stand for the full sixty seconds of vibraflex and an additional 2:27 min when standing for endurance. She continues to demonstrate difficulty with eccentric  step down from stairs, which she reports is worsened by her own anxieties. Utilized KT tape to assist with distal LE edema. She reports upcoming cardiology apt and expressed she plans to discuss this with her physician as it could be s/s consistent with CHF, per pt.      PLAN: Continue use of vibraflex, if indicated. Assess response to KT tape for edema in distal B LE. Re-apply if necessary.  Continue use of powerboost to posterior LE.     SIGNATURE: Maria R Garya PTA [place] License #: [id number]  Electronically Signed on 1/18/2024        [address] [place] [address]. [address]  [phone]

## 2024-01-19 ENCOUNTER — CLINICAL SUPPORT NO REQUIREMENTS (OUTPATIENT)
Dept: CARDIOLOGY | Facility: CLINIC | Age: 69
End: 2024-01-19
Payer: MEDICARE

## 2024-01-19 ENCOUNTER — OFFICE VISIT (OUTPATIENT)
Dept: CARDIOLOGY | Facility: CLINIC | Age: 69
End: 2024-01-19
Payer: MEDICARE

## 2024-01-19 VITALS
OXYGEN SATURATION: 93 % | DIASTOLIC BLOOD PRESSURE: 66 MMHG | HEART RATE: 76 BPM | HEIGHT: 67 IN | BODY MASS INDEX: 29.76 KG/M2 | SYSTOLIC BLOOD PRESSURE: 102 MMHG

## 2024-01-19 DIAGNOSIS — I25.10 CORONARY ARTERY DISEASE INVOLVING NATIVE CORONARY ARTERY OF NATIVE HEART WITHOUT ANGINA PECTORIS: ICD-10-CM

## 2024-01-19 DIAGNOSIS — I49.5 SICK SINUS SYNDROME: ICD-10-CM

## 2024-01-19 DIAGNOSIS — Z95.0 PRESENCE OF CARDIAC PACEMAKER: Primary | ICD-10-CM

## 2024-01-19 DIAGNOSIS — I10 ESSENTIAL HYPERTENSION: Primary | Chronic | ICD-10-CM

## 2024-01-19 DIAGNOSIS — E78.5 HYPERLIPIDEMIA LDL GOAL <70: ICD-10-CM

## 2024-01-19 PROCEDURE — 99214 OFFICE O/P EST MOD 30 MIN: CPT | Performed by: NURSE PRACTITIONER

## 2024-01-19 PROCEDURE — 1159F MED LIST DOCD IN RCRD: CPT | Performed by: NURSE PRACTITIONER

## 2024-01-19 PROCEDURE — 1160F RVW MEDS BY RX/DR IN RCRD: CPT | Performed by: NURSE PRACTITIONER

## 2024-01-19 PROCEDURE — 3078F DIAST BP <80 MM HG: CPT | Performed by: NURSE PRACTITIONER

## 2024-01-19 PROCEDURE — 3074F SYST BP LT 130 MM HG: CPT | Performed by: NURSE PRACTITIONER

## 2024-01-19 PROCEDURE — 93000 ELECTROCARDIOGRAM COMPLETE: CPT | Performed by: NURSE PRACTITIONER

## 2024-01-19 NOTE — PROGRESS NOTES
Dual Chamber Pacemaker Interrogation Report  IN OFFICE    January 19, 2024    Primary Cardiologist: David  : Med[employer]onic Model: Alphonsoa  KAYLEE [id number]  Implant date: 11/17/2016    Reason for evaluation: Routine  Indication for pacemaker: Sick Sinus Syndrome    Interrogation performed by:  MARIA ALEJANDRA Lyons    Measurements  Atrial sensing - P wave: 1.8 mV  Atrial threshold: 0.5V@ 0.4ms  Atrial lead impedance: 456 ohms  Ventricular sensing - R wave: 13.4 mV  Ventricular threshold: 0.75 V @ 0.4 ms  Ventricular lead impedance:   741 ohms     Manual sensing and threshold testing performed:  Yes    Diagnostic Data  Atrial paced: 13.9 %  Ventricular paced: <0.1 %    Episodes/Alerts: 0    Battery status: Satisfactory , 4 years (3.5-5 years)      Final Parameters  Mode:  AAIR <=> DDDR  Lower rate: 60 bpm   Upper rate: 130 bpm  AV Delay: paced- 180 ms  Sensed-150 ms  Atrial - Amplitude: 1.5 V   Pulse width: 0.4 ms   Sensitivity: 0.3 mV     Ventricular - Amplitude: 2 V  Pulse width: 0.4 ms  Sensitivity: 0.9 mV      Changes made: No changes.    Conclusions: Normal Pacemaker Function, Stable Pacing and Sensing Thresholds, and Adequate Battery Reserve    Remote Monitor:  Yes, needs manual transmission (schedule given to patient)    Follow up: Annually in office, every 3 months via [employer].

## 2024-01-19 NOTE — PROGRESS NOTES
Subjective:     Encounter Date:  1/19/2024      Patient ID: Tawny Shin is a 68 y.o. female.    Chief Complaint: Routine follow-up CAD, SSS with pacemaker in place     History of Present Illness     The patient presents to follow-up regarding her coronary artery disease (RCA PCI in 2010, followed by non-STEMI requiring LAD PCI in 2013).  Most recent cardiac cath was done in September 2019 by Dr. Melchor, reporting patent stents and no other obstructive CAD.  She also has a history of hyperlipidemia, RA, hypertension.  She transferred her care from [facility] to Dr. Hernandez in December 2019.  At that point, she was no longer having chest pain after Imdur had been increased to 60 mg daily at the prior visit.  She was taking aspirin and Brilinta 90 mg twice daily.  Due to statin intolerance, Repatha was prescribed and she was instructed to stop taking Zetia.  She was transitioned from Brilinta to Plavix.    She came back to see Dr. Hernandez in August 2020 and she was having some short-lived substernal chest pain with no aggravating or alleviating factors.  She reported ENT thought that might be reflux.  This was noted to be different compared to her angina.  She was not requiring NTG.  She did report an episode of vasovagal syncope when getting injections at Dr. Chaidez office. pacemaker interrogation following showed no arrhythmias at the time of her event.  No changes were made at that visit.    Dr. Hernandez and I later saw the patient in consultation when she was hospitalized in September 2021 following surgery for a chronic right hip wound.  It was noted she had had a complicated previous several months including ruptured gluteus tendon and right hip pain, SHERRI a bacteremia, lumbar surgery for osteomyelitis, and had undergone cervical fusion procedure.  She had also been diagnosed with a left lower extremity DVT in June 2021, prompting her to be placed on Eliquis.  Echo had been done in February 2021 which showed a  normal LVEF, mild MR, no evidence of vegetation on heart valves or pacemaker leads.  She was stable from a cardiac standpoint at that time.  Recommendations were given to continue aspirin 81 mg daily without interruption given prior drug-eluting stent placement in 2010 and 2013.  Beta-blocker and long-acting nitrate were continued.  She was off of Repatha due to insurance reasons, and it was noted that we would get this resumed as an outpatient or consider an alternative PCSK9 inhibitor.  Continuation of Eliquis was deferred to the prescribing physician, she was taking this for noncardiac reasons (DVT).    By way of review, she has had 3 more hospitalizations following that September 2021 hospitalization:  -Discharged 10/28/2021 following a stay for bacteremia  -Discharged 11/12/2022 following a stay for epidural hematoma  -Discharged 4/26/2022 after having presented with left-sided pleuritic chest pain and shortness of breath.  She was found to have a moderate to large left pleural effusion requiring thoracentesis and diuresis.  Cytology was negative for malignancy.  Of note, her BNP was normal.  See most recent echocardiogram done around that time below.  She did have mildly elevated flat trending troponins at 0.046 and 0.036, without any chest discomfort concerning for angina or ACS.    I saw the patient in clinic in early May and she was doing fair.  She was in a motorized wheelchair.  She reported very limited ability to move her right lower extremity following her surgeries.  She denied any chest pain, shortness of breath, palpitations, orthopnea, PND.  When questioned about the chest pain she had in April at the time of her pleural effusion she described this as a dull constant pain on the left side that was worse with deep breathing.  This resolved after the thoracentesis.  She denied other chest pain following that.  She had started taking Lasix daily for leg swelling.  She was noted to be doing well from a  cardiac standpoint and no changes were made other than to prescribe Praluent given her coronary disease and hyperlipidemia with prior intolerance to statins and cost/coverage issues when prescribed Repatha.    Since that time she has had a prolonged hospitalization 6/4 through 7/7/2023.  The first part of that hospitalization was here at [facility] and around mid June she was transitioned to the long-term acute care hospital.  During that time she was treated for her right hip abscess and underwent physical therapy.  On 7/7 she was discharged to the nursing home.  By way of review and per report from the patient she did not have any cardiac complications throughout her prolonged hospitalization.    I saw her December 2022 and she was still at the nursing home, but gradually getting her stamina.  She was exercising on the stationary bike at the nursing home 3 times per week.  She was not having any shortness of breath, orthopnea, PND.  She was still taking Lasix daily for swelling.  She reported 2 episodes of chest pain since her prior visit.  These would occur at rest and last a few seconds without radiation or other symptoms.  She did not recall ever being started on PCSK9 inhibitor and I did prescribe her Praluent again.    Today the patient reports she is doing fair overall.  She has returned home from the nursing home earlier this month.  She reports chronic lower extremity edema.  She denies any shortness of breath, orthopnea, PND, palpitations, syncope or presyncope.  Blood pressure is well-controlled.  She reports 3 episodes of chest pain since her last visit, which have occurred in the past 2 to 3 weeks.  Longest episode was 1 hour.  All of these episodes occurred at rest.  She denies any specific alleviating factors, but does state aspirin seem to help 1 of these episodes.  She had radiation to her left upper arm.  She states this is different compared to what she has experienced with angina and MI in the  past.  She is unable to be very active due to her mobility issues with her right lower extremity, but she states she does ambulate short distances around her house.  She has not had any chest discomfort or arm pain with exertion.  She has not had any symptoms in the past 1 week.  She still has not started Praluent and states she does not recall this being prescribed.      The following portions of the patient's history were reviewed and updated as appropriate: allergies, current medications, past family history, past medical history, past social history, past surgical history and problem list.    Review of Systems   Constitutional: Negative for malaise/fatigue.   Cardiovascular:  Positive for chest pain and leg swelling. Negative for claudication, dyspnea on exertion, near-syncope, orthopnea, palpitations, paroxysmal nocturnal dyspnea and syncope.   Respiratory:  Negative for cough and shortness of breath.    Hematologic/Lymphatic: Does not bruise/bleed easily.   Musculoskeletal:  Negative for falls.        Pt reports limited movement of her RLE    Gastrointestinal:  Negative for bloating.   Neurological:  Positive for weakness. Negative for dizziness and light-headedness.         Current Outpatient Medications:     acetaminophen (TYLENOL) 325 MG tablet, Take 2 tablets by mouth Every 6 (Six) Hours As Needed (mild pain). Indications: Fever, Pain, Disp: , Rfl:     apixaban (ELIQUIS) 5 MG tablet tablet, Take 1 tablet by mouth 2 (Two) Times a Day. Indications: Other - full anticoagulation, Disp: 180 tablet, Rfl: 3    aspirin 81 MG EC tablet, Take 1 tablet by mouth Daily. Indications: Disease involving Lipid Deposits in the Arteries, Disp: , Rfl:     carvedilol (COREG) 25 MG tablet, Take 1 tablet by mouth 2 (Two) Times a Day With Meals. Indications: Heart Attack, Disp: 180 tablet, Rfl: 3    Diclofenac Sodium (VOLTAREN) 1 % gel gel, Apply 4 g topically to the appropriate area as directed 4 (Four) Times a Day As Needed.  Indications: Joint Damage causing Pain and Loss of Function, Disp: , Rfl:     DULoxetine (CYMBALTA) 60 MG capsule, Take 1 capsule by mouth Daily. Indications: Major Depressive Disorder, Disp: 90 capsule, Rfl: 3    ferrous sulfate 324 (65 Fe) MG tablet delayed-release EC tablet, Take 1 tablet by mouth Daily With Breakfast. Indications: Iron Deficiency, Disp: 90 tablet, Rfl: 3    fluticasone (FLONASE) 50 MCG/ACT nasal spray, 1 spray into the nostril(s) as directed by provider Daily., Disp: 18.2 mL, Rfl: 5    folic acid (FOLVITE) 1 MG tablet, Take 1 tablet by mouth Daily. Indications: Anemia From Inadequate Folic Acid, Disp: 90 tablet, Rfl: 3    furosemide (LASIX) 40 MG tablet, Take 1 tablet by mouth Daily. Indications: Edema, Disp: 90 tablet, Rfl: 3    isosorbide mononitrate (IMDUR) 60 MG 24 hr tablet, Take 1 tablet by mouth Every Morning. Indications: Stable Angina Pectoris, Disp: 90 tablet, Rfl: 3    leflunomide (ARAVA) 20 MG tablet, Take 1 tablet by mouth every night at bedtime. Indications: Rheumatoid Arthritis, Disp: 90 tablet, Rfl: 3    levothyroxine (SYNTHROID, LEVOTHROID) 75 MCG tablet, Take 1 tablet by mouth Daily. Indications: Underactive Thyroid, Disp: 90 tablet, Rfl: 3    Lidocaine 4 % patch, Apply  topically every night at bedtime. To lower back  Indications: Arthritis Related to an Inflammatory Disorder, Disp: , Rfl:     losartan (COZAAR) 50 MG tablet, TAKE 1 TABLET BY MOUTH DAILY, Disp: 90 tablet, Rfl: 3    magnesium hydroxide (MILK OF MAGNESIA) 400 MG/5ML suspension, Take 30 mL by mouth Daily As Needed for Constipation., Disp: , Rfl:     Menthol-Zinc Oxide 0.45-20 % ointment, Apply 1 application  topically 2 (Two) Times a Day. Apply to right buttock, Disp: , Rfl:     multivitamin with minerals tablet tablet, Take 1 tablet by mouth Daily., Disp:  , Rfl:     nitroglycerin (Nitrostat) 0.4 MG SL tablet, Place 1 tablet under the tongue Every 5 (Five) Minutes As Needed for Chest Pain. Take no more than 3  doses in 15 minutes. (Patient taking differently: Place 1 tablet under the tongue Every 5 (Five) Minutes As Needed for Chest Pain. Take no more than 3 doses in 15 minutes.), Disp: , Rfl: 12    predniSONE (DELTASONE) 5 MG tablet, Take 1 tablet by mouth Daily. Indications: Acute Bursitis (Patient taking differently: Take 1 mg by mouth Daily. Indications: Acute Bursitis), Disp: 90 tablet, Rfl: 3    salsalate (DISALCID) 500 MG tablet, TAKE 5 TABLETS BY MOUTH ON MONDAY, WEDNESDAY, AND FRIDAY AND TAKE 4 TABLETS BY MOUTH ON TUESDAY, THURSDAY, AND SATURDAY, Disp: 384 tablet, Rfl: 3    sucralfate (CARAFATE) 1 g tablet, TAKE ONE TABLET BY MOUTH FOUR TIMES A DAY BEFORE MEALS AND AT BEDTIME, Disp: 360 tablet, Rfl: 3    traMADol (ULTRAM) 50 MG tablet, Take 1 tablet by mouth Every 6 (Six) Hours As Needed for Moderate Pain., Disp: , Rfl:     valACYclovir (VALTREX) 500 MG tablet, Take 1 tablet by mouth Daily. Indications: Shingles, Disp: 90 tablet, Rfl: 3    Vibegron 75 MG tablet, Take 1 tablet by mouth Daily., Disp: 30 tablet, Rfl: 11    Alirocumab 75 MG/ML solution auto-injector, Inject 1 mL under the skin into the appropriate area as directed Every 14 (Fourteen) Days., Disp: 2.24 mL, Rfl: 11       Objective:      Vitals:    01/19/24 1000   BP: 102/66   Pulse: 76   SpO2: 93%   Pt was unable to stand to weigh today     Physical Exam  Constitutional:       General: She is not in acute distress.     Appearance: She is well-developed. She is not diaphoretic.   HENT:      Head: Normocephalic and atraumatic.   Eyes:      Pupils: Pupils are equal, round, and reactive to light.   Neck:      Vascular: No JVD.   Cardiovascular:      Rate and Rhythm: Normal rate and regular rhythm.      Heart sounds: Normal heart sounds. No murmur heard.  Pulmonary:      Effort: Pulmonary effort is normal. No respiratory distress.      Breath sounds: Normal breath sounds.   Chest:      Chest wall: No tenderness.   Abdominal:      General: Bowel sounds are  normal. There is no distension.      Palpations: Abdomen is soft.      Tenderness: There is no abdominal tenderness.   Musculoskeletal:         General: Normal range of motion.      Cervical back: Normal range of motion and neck supple.      Right lower leg: Edema present.      Left lower leg: Edema present.   Skin:     General: Skin is warm and dry.   Neurological:      Mental Status: She is alert and oriented to person, place, and time.      Cranial Nerves: No cranial nerve deficit.   Psychiatric:         Behavior: Behavior normal.         Lab Review:   Lab Results   Component Value Date    GLUCOSE 175 (H) 12/07/2022    BUN 43 (H) 12/07/2022    CREATININE 1.26 (H) 12/07/2022    EGFRIFNONA 99 11/11/2021    EGFRIFAFRI 110 10/12/2021    BCR 34.1 (H) 12/07/2022    K 3.9 12/07/2022    CO2 27.4 12/07/2022    CALCIUM 8.6 12/07/2022    PROTENTOTREF 7.2 12/07/2022    ALBUMIN 3.80 12/07/2022    LABIL2 1.1 12/07/2022    AST 12 12/07/2022    ALT 12 12/07/2022     Lab Results   Component Value Date    CHOL 129 02/09/2021    CHLPL 243 (H) 12/07/2022    TRIG 95 12/07/2022    HDL 58 12/07/2022     (H) 12/07/2022     Lab Results   Component Value Date    WBC 4.61 12/07/2022    HGB 11.1 (L) 12/07/2022    HCT 35.1 12/07/2022    MCV 91.2 12/07/2022     12/07/2022     Lab Results   Component Value Date    HGBA1C 5.20 04/23/2022             ECG 12 Lead    Date/Time: 1/19/2024 12:12 PM  Performed by: Ximena Vazquez APRN    Authorized by: Ximena Vazquez APRN  Comparison: compared with previous ECG from 12/7/2022  Similar to previous ECG  Comparison to previous ECG: T wave inversion aVL  Rhythm: sinus rhythm  BPM: 79  Other findings: non-specific ST-T wave changes        4/2022 echo:    Left ventricular ejection fraction appears to be 66 - 70%.  Left ventricular diastolic function was normal.  Abnormal global longitudinal LV strain (GLS) = -10.0%  Left atrial volume is mildly increased.  Estimated right ventricular systolic  pressure from tricuspid regurgitation is normal (<35 mmHg).             Assessment/Plan:     Problem List Items Addressed This Visit          Cardiovascular and Mediastinum    Coronary artery disease - Primary    Overview     PCI RCA '10, NSTEMI requiring LAD PCI in '13    [facility] 9/3/19 at [facility]: no significant dz; stents patent         Hyperlipidemia: remarkable improvement on Repatha in the past; currently uncontrolled prescribing PCSK9i again today     Hypertension: well controlled    Pacemaker: functioning appropriately    Sick sinus syndrome (CMS/[facility]): stable               Recommendations/plans:    1.  CAD: Established problem, see notes above regarding recent chest pain.  There are some atypical features and she has not had any exertional chest discomfort.  She has not had any chest pain in the past 1 week.  We will follow-up with her over the phone 1 to 2 weeks.  If she is continuing to have recurrent chest discomfort, I will place an order for a dobutamine stress echocardiogram.    -Continue aspirin 81 mg daily indefinitely without interruption.  -Continue current doses of carvedilol and Imdur  -Has not been requiring as needed sublingual nitroglycerin    2.  Sick sinus syndrome with dual-chamber pacemaker in place: Device interrogated today: Satisfactory battery life, no AF/AT, normal device function, 14% atrial pacing, less than 0.1% ventricular pacing.    -Continue with routine device interrogations    3.  Essential hypertension: Well-controlled on Coreg, losartan, and nitrate.  She also takes daily Lasix for swelling per her report.  She has no history of congestive heart failure.  From a cardiac standpoint it would be okay to take this on an as-needed basis only.  Will defer to the prescribing provider.    4.  Hyperlipidemia: Intolerant to statins.  Previously stopped Repatha due to insurance coverage reasons.  LDL uncontrolled.  Start Praluent 75 mg every 14 days for goal LDL of less than 70 and  cardiovascular event prevention.    5.  Lower extremity DVT June 2021: Anticoagulated with Eliquis per PCP.  As previously noted by Dr. Hernandez, the decision regarding how long to continue this will be up to the prescribing provider, as she does not take this for cardiac reason.    F/u  to be determined after we contact her in 1-2 weeks; if no recurrent chest pain, follow up with Dr. Hernandez in 6 months     Ximena Vazquez APRN

## 2024-01-22 ENCOUNTER — OFFICE VISIT (OUTPATIENT)
Dept: INTERNAL MEDICINE | Facility: CLINIC | Age: 69
End: 2024-01-22
Payer: MEDICARE

## 2024-01-22 VITALS
HEIGHT: 67 IN | HEART RATE: 74 BPM | DIASTOLIC BLOOD PRESSURE: 64 MMHG | OXYGEN SATURATION: 94 % | SYSTOLIC BLOOD PRESSURE: 120 MMHG | BODY MASS INDEX: 29.76 KG/M2 | TEMPERATURE: 97.8 F

## 2024-01-22 DIAGNOSIS — E66.3 OVERWEIGHT (BMI 25.0-29.9): ICD-10-CM

## 2024-01-22 DIAGNOSIS — D50.9 IRON DEFICIENCY ANEMIA, UNSPECIFIED IRON DEFICIENCY ANEMIA TYPE: ICD-10-CM

## 2024-01-22 DIAGNOSIS — I10 ESSENTIAL HYPERTENSION: ICD-10-CM

## 2024-01-22 DIAGNOSIS — Z79.01 CHRONIC ANTICOAGULATION: Chronic | ICD-10-CM

## 2024-01-22 DIAGNOSIS — R73.09 ELEVATED GLUCOSE: ICD-10-CM

## 2024-01-22 DIAGNOSIS — E55.9 VITAMIN D DEFICIENCY: Primary | ICD-10-CM

## 2024-01-22 DIAGNOSIS — Z79.899 ENCOUNTER FOR LONG-TERM CURRENT USE OF MEDICATION: ICD-10-CM

## 2024-01-22 DIAGNOSIS — R82.90 MALODOROUS URINE: ICD-10-CM

## 2024-01-22 DIAGNOSIS — E78.5 HYPERLIPIDEMIA LDL GOAL <70: ICD-10-CM

## 2024-01-22 DIAGNOSIS — I87.2 VENOUS INSUFFICIENCY: Chronic | ICD-10-CM

## 2024-01-22 PROCEDURE — 1160F RVW MEDS BY RX/DR IN RCRD: CPT | Performed by: INTERNAL MEDICINE

## 2024-01-22 PROCEDURE — 1159F MED LIST DOCD IN RCRD: CPT | Performed by: INTERNAL MEDICINE

## 2024-01-22 PROCEDURE — 3078F DIAST BP <80 MM HG: CPT | Performed by: INTERNAL MEDICINE

## 2024-01-22 PROCEDURE — G0439 PPPS, SUBSEQ VISIT: HCPCS | Performed by: INTERNAL MEDICINE

## 2024-01-22 PROCEDURE — 1170F FXNL STATUS ASSESSED: CPT | Performed by: INTERNAL MEDICINE

## 2024-01-22 PROCEDURE — 3074F SYST BP LT 130 MM HG: CPT | Performed by: INTERNAL MEDICINE

## 2024-01-22 RX ORDER — ONDANSETRON 4 MG/1
4 TABLET, FILM COATED ORAL EVERY 8 HOURS PRN
Qty: 45 TABLET | Refills: 0 | Status: SHIPPED | OUTPATIENT
Start: 2024-01-22

## 2024-01-22 RX ORDER — LEVOTHYROXINE SODIUM 0.07 MG/1
75 TABLET ORAL DAILY
Qty: 90 TABLET | Refills: 3 | Status: SHIPPED | OUTPATIENT
Start: 2024-01-22

## 2024-01-22 RX ORDER — FAMOTIDINE 40 MG/1
40 TABLET, FILM COATED ORAL DAILY
Qty: 90 TABLET | Refills: 3 | OUTPATIENT
Start: 2024-01-22

## 2024-01-22 RX ORDER — FERROUS SULFATE 324(65)MG
324 TABLET, DELAYED RELEASE (ENTERIC COATED) ORAL
Qty: 90 TABLET | Refills: 3 | Status: SHIPPED | OUTPATIENT
Start: 2024-01-22

## 2024-01-22 NOTE — PROGRESS NOTES
The ABCs of the Annual Wellness Visit  Subsequent Medicare Wellness Visit    Chief Complaint   Patient presents with    Medicare Wellness-subsequent    colon cancer screening       Subjective    History of Present Illness:  Tawny Shin is a 68 y.o. female who presents for a Subsequent Medicare Wellness Visit.    The following portions of the patient's history were reviewed and   updated as appropriate: allergies, current medications, past family history, past medical history, past social history, past surgical history and problem list.    Compared to one year ago, the patient feels her physical   health is the same.    Compared to one year ago, the patient feels her mental   health is the same.    Recent Hospitalizations:  She was not admitted to the hospital during the last year.       Current Medical Providers:  Patient Care Team:  Moises Oliveira MD as PCP - General (Internal Medicine)  Moises Holman MD as Consulting Physician (Otolaryngology)  Christiano Rao PA as Physician Assistant (Otolaryngology)  Candelaria David MD as Obstetrician (Obstetrics and Gynecology)  Omar Hernandez MD as Cardiologist (Cardiology)  Leta Crawford APRN as Nurse Practitioner (Nurse Practitioner)  Leta Crawford APRN as Nurse Practitioner (Nurse Practitioner)    Outpatient Medications Prior to Visit   Medication Sig Dispense Refill    acetaminophen (TYLENOL) 325 MG tablet Take 2 tablets by mouth Every 6 (Six) Hours As Needed (mild pain). Indications: Fever, Pain      Alirocumab 75 MG/ML solution auto-injector Inject 1 mL under the skin into the appropriate area as directed Every 14 (Fourteen) Days. 2.24 mL 11    apixaban (ELIQUIS) 5 MG tablet tablet Take 1 tablet by mouth 2 (Two) Times a Day. Indications: Other - full anticoagulation 180 tablet 3    aspirin 81 MG EC tablet Take 1 tablet by mouth Daily. Indications: Disease involving Lipid Deposits in the Arteries      carvedilol (COREG) 25 MG tablet  Take 1 tablet by mouth 2 (Two) Times a Day With Meals. Indications: Heart Attack 180 tablet 3    Diclofenac Sodium (VOLTAREN) 1 % gel gel Apply 4 g topically to the appropriate area as directed 4 (Four) Times a Day As Needed. Indications: Joint Damage causing Pain and Loss of Function      DULoxetine (CYMBALTA) 60 MG capsule Take 1 capsule by mouth Daily. Indications: Major Depressive Disorder 90 capsule 3    ferrous sulfate 324 (65 Fe) MG tablet delayed-release EC tablet Take 1 tablet by mouth Daily With Breakfast. Indications: Iron Deficiency 90 tablet 3    fluticasone (FLONASE) 50 MCG/ACT nasal spray 1 spray into the nostril(s) as directed by provider Daily. 18.2 mL 5    folic acid (FOLVITE) 1 MG tablet Take 1 tablet by mouth Daily. Indications: Anemia From Inadequate Folic Acid 90 tablet 3    furosemide (LASIX) 40 MG tablet Take 1 tablet by mouth Daily. Indications: Edema 90 tablet 3    isosorbide mononitrate (IMDUR) 60 MG 24 hr tablet Take 1 tablet by mouth Every Morning. Indications: Stable Angina Pectoris 90 tablet 3    leflunomide (ARAVA) 20 MG tablet Take 1 tablet by mouth every night at bedtime. Indications: Rheumatoid Arthritis 90 tablet 3    levothyroxine (SYNTHROID, LEVOTHROID) 75 MCG tablet Take 1 tablet by mouth Daily. Indications: Underactive Thyroid 90 tablet 3    Lidocaine 4 % patch Apply  topically every night at bedtime. To lower back  Indications: Arthritis Related to an Inflammatory Disorder      losartan (COZAAR) 50 MG tablet TAKE 1 TABLET BY MOUTH DAILY 90 tablet 3    magnesium hydroxide (MILK OF MAGNESIA) 400 MG/5ML suspension Take 30 mL by mouth Daily As Needed for Constipation. Indications: Constipation      Menthol-Zinc Oxide 0.45-20 % ointment Apply 1 application  topically 2 (Two) Times a Day. Apply to right buttock      multivitamin with minerals tablet tablet Take 1 tablet by mouth Daily.      nitroglycerin (Nitrostat) 0.4 MG SL tablet Place 1 tablet under the tongue Every 5 (Five)  Minutes As Needed for Chest Pain. Take no more than 3 doses in 15 minutes. (Patient taking differently: Place 1 tablet under the tongue Every 5 (Five) Minutes As Needed for Chest Pain. Take no more than 3 doses in 15 minutes.)  12    predniSONE (DELTASONE) 5 MG tablet Take 1 tablet by mouth Daily. Indications: Acute Bursitis (Patient taking differently: Take 1 mg by mouth Daily. Indications: Acute Bursitis) 90 tablet 3    salsalate (DISALCID) 500 MG tablet TAKE 5 TABLETS BY MOUTH ON MONDAY, WEDNESDAY, AND FRIDAY AND TAKE 4 TABLETS BY MOUTH ON TUESDAY, THURSDAY, AND SATURDAY 384 tablet 3    sucralfate (CARAFATE) 1 g tablet TAKE ONE TABLET BY MOUTH FOUR TIMES A DAY BEFORE MEALS AND AT BEDTIME 360 tablet 3    traMADol (ULTRAM) 50 MG tablet Take 1 tablet by mouth Every 6 (Six) Hours As Needed for Moderate Pain.      valACYclovir (VALTREX) 500 MG tablet Take 1 tablet by mouth Daily. Indications: Shingles 90 tablet 3    Vibegron 75 MG tablet Take 1 tablet by mouth Daily. 30 tablet 11     No facility-administered medications prior to visit.       Opioid medication/s are on active medication list.  and I have evaluated her active treatment plan and pain score trends (see table).  Vitals:    01/22/24 0938   PainSc: 0-No pain     I have reviewed the chart for potential of high risk medication and harmful drug interactions in the elderly.          Aspirin is on active medication list. Aspirin use is indicated based on review of current medical condition/s. Pros and cons of this therapy have been discussed today. Benefits of this medication outweigh potential harm.  Patient has been encouraged to continue taking this medication.  .      Patient Active Problem List   Diagnosis    T12 compression fracture, initial encounter    Chronic embolism and thrombosis of unspecified deep veins of left lower extremity    Chronic anticoagulation    Iron deficiency anemia    Osteoporosis    Epidural hematoma    Pleural effusion, left     "Functional neurological symptom disorder with weakness or paralysis    Overweight (BMI 25.0-29.9)    Rheumatoid arthritis involving multiple sites    (HFpEF) heart failure with preserved ejection fraction    Venous insufficiency    Coronary artery disease involving native coronary artery of native heart without angina pectoris    Sick sinus syndrome    Essential hypertension    Pressure injury of skin of heel    Chronic pain    Anemia of chronic disease    Cholelithiasis    Pressure injury of skin of buttock    Near functional paraplegia    Skin cancer    Squamous cell carcinoma of back    Hematoma    Right-sided chest wall pain    Hyperlipidemia LDL goal <70    Malodorous urine     Advance Care Planning  Advance Directive is not on file.  ACP discussion was held with the patient during this visit. Patient has an advance directive (not in EMR), copy requested.       Objective    Vitals:    24 0938   BP: 120/64   BP Location: Left arm   Patient Position: Sitting   Cuff Size: Adult   Pulse: 74   Temp: 97.8 °F (36.6 °C)   TempSrc: Temporal   SpO2: 94%   Weight: Comment: unable to weigh   Height: 170.2 cm (67\")   PainSc: 0-No pain     Estimated body mass index is 29.76 kg/m² as calculated from the following:    Height as of this encounter: 170.2 cm (67\").    Weight as of 24: 86.2 kg (190 lb).           Does the patient have evidence of cognitive impairment? No                HEALTH RISK ASSESSMENT    Smoking Status:  Social History     Tobacco Use   Smoking Status Never    Passive exposure: Never   Smokeless Tobacco Never     Alcohol Consumption:  Social History     Substance and Sexual Activity   Alcohol Use No     Fall Risk Screen:    CHAS Fall Risk Assessment was completed, and patient is at LOW risk for falls.Assessment completed on:2024    Depression Screenin/22/2024     9:36 AM   PHQ-2/PHQ-9 Depression Screening   Little Interest or Pleasure in Doing Things 0-->not at all   Feeling Down, " Depressed or Hopeless 0-->not at all   PHQ-9: Brief Depression Severity Measure Score 0       Health Habits and Functional and Cognitive Screenin/22/2024     9:37 AM   Functional & Cognitive Status   Do you have difficulty preparing food and eating? No   Do you have difficulty bathing yourself, getting dressed or grooming yourself? Yes   Do you have difficulty using the toilet? Yes   Do you have difficulty moving around from place to place? Yes   Do you have trouble with steps or getting out of a bed or a chair? Yes   Current Diet Well Balanced Diet   Dental Exam Up to date   Eye Exam Up to date   Exercise (times per week) 0 times per week   Current Exercises Include No Regular Exercise   Do you need help using the phone?  No   Are you deaf or do you have serious difficulty hearing?  Yes   Do you need help to go to places out of walking distance? Yes   Do you need help shopping? Yes   Do you need help preparing meals?  Yes   Do you need help with housework?  Yes   Do you need help with laundry? Yes   Do you need help taking your medications? Yes   Do you need help managing money? No   Do you ever drive or ride in a car without wearing a seat belt? No   Have you felt unusual stress, anger or loneliness in the last month? No   Who do you live with? Other   If you need help, do you have trouble finding someone available to you? No   Have you been bothered in the last four weeks by sexual problems? No   Do you have difficulty concentrating, remembering or making decisions? No       Age-appropriate Screening Schedule:  Refer to the list below for future screening recommendations based on patient's age, sex and/or medical conditions. Orders for these recommended tests are listed in the plan section. The patient has been provided with a written plan.    Health Maintenance   Topic Date Due    MAMMOGRAM  2021    DXA SCAN  2021    COVID-19 Vaccine ( season) 2023    LIPID PANEL  2023     COLORECTAL CANCER SCREENING  01/01/2026 (Originally 1955)    BMI FOLLOWUP  05/16/2024    ANNUAL WELLNESS VISIT  01/22/2025    TDAP/TD VACCINES (4 - Td or Tdap) 10/10/2030    HEPATITIS C SCREENING  Completed    INFLUENZA VACCINE  Completed    Pneumococcal Vaccine 65+  Completed    ZOSTER VACCINE  Completed              Assessment & Plan   CMS Preventative Services Quick Reference  Risk Factors Identified During Encounter  Immunizations Discussed/Encouraged: Influenza, COVID19, and RSV (Respiratory Syncytial Virus)  Dental Screening Recommended  Vision Screening Recommended  The above risks/problems have been discussed with the patient.  Follow up actions/plans if indicated are seen below in the Assessment/Plan Section.  Pertinent information has been shared with the patient in the After Visit Summary.    Diagnoses and all orders for this visit:    1. Vitamin D deficiency (Primary)  -     Vitamin D,25-Hydroxy    2. Iron deficiency anemia, unspecified iron deficiency anemia type  -     CBC & Differential    3. Hyperlipidemia LDL goal <70  -     Lipid Panel  -     TSH Rfx On Abnormal To Free T4    4. Essential hypertension  -     CBC & Differential  -     Comprehensive Metabolic Panel  -     Urinalysis With Microscopic - Urine, Clean Catch    5. Encounter for long-term current use of medication  -     CBC & Differential  -     Comprehensive Metabolic Panel  -     Urinalysis With Microscopic - Urine, Clean Catch  -     Lipid Panel  -     TSH Rfx On Abnormal To Free T4    6. Elevated glucose  -     Hemoglobin A1c    7. Venous insufficiency    8. Malodorous urine    9. Overweight (BMI 25.0-29.9)    10. Chronic anticoagulation    Other orders  -     ondansetron (Zofran) 4 MG tablet; Take 1 tablet by mouth Every 8 (Eight) Hours As Needed for Nausea or Vomiting.  Dispense: 45 tablet; Refill: 0        Recommend at least annual dental and vision screening.  Recommend annual influenza vaccination  Recommend a varied diet  and appropriate portion sizes.   CDC recommendations for physical activity:  At least 150 minutes a week (for example, 30 minutes a day, 5 days a week) of moderate-intensity activity such as brisk walking. Or can consider 75 minutes a week of vigorous-intensity activity such as hiking, jogging, or running.  At least 2 days a week of activities that strengthen muscles.  Plus activities to improve balance.  Patient would not be able to do that strenuous of exercise but can increase activity as tolerated.     For the malodorous urine, recommend trying some Azo cranberry.  She is getting boric acid suppositories.  She can also try other pH balance and over-the-counter products.    Due for annual labs, as above.    Patient has been started on a PSK 9 inhibitor by cardiology, I agree with this assessment.  She was intolerant to statins.  Hopefully will be beneficial from this.    Patient has had a little bit of nausea, I worry that it may be medication related.  Patient was recently started on Gemtesa.  Will send in some Zofran.  Patient can monitor timing of medication.  She may want to try the Gemtesa in the evening.    Not sure if the patient would be a candidate for bulking agents.  We can discuss with Dr. Plascencia.    Patient declines colonoscopy given her condition.  I agree with this.    Patient got COVID vaccine and RSV recently.  Unsure of date.    Result Review :           Follow Up:   Return in about 5 months (around 6/22/2024), or if symptoms worsen or fail to improve, for Recheck.     An After Visit Summary and PPPS were made available to the patient.                         PHILL Oliveira MD, FACP, [facility]      Electronically signed by Moises Oliveira MD, 01/22/24, 9:57 AM CST.

## 2024-01-23 ENCOUNTER — TREATMENT (OUTPATIENT)
Dept: PHYSICAL THERAPY | Facility: CLINIC | Age: 69
End: 2024-01-23
Payer: MEDICARE

## 2024-01-23 DIAGNOSIS — F44.4 FUNCTIONAL NEUROLOGICAL SYMPTOM DISORDER WITH WEAKNESS OR PARALYSIS: Primary | ICD-10-CM

## 2024-01-23 DIAGNOSIS — G82.20 LOWER PARAPLEGIA: ICD-10-CM

## 2024-01-23 LAB
25(OH)D3+25(OH)D2 SERPL-MCNC: 85.2 NG/ML (ref 30–100)
ALBUMIN SERPL-MCNC: 3.5 G/DL (ref 3.5–5.2)
ALBUMIN/GLOB SERPL: 1.3 G/DL
ALP SERPL-CCNC: 63 U/L (ref 39–117)
ALT SERPL-CCNC: 27 U/L (ref 1–33)
APPEARANCE UR: ABNORMAL
AST SERPL-CCNC: 22 U/L (ref 1–32)
BACTERIA #/AREA URNS HPF: ABNORMAL /HPF
BASOPHILS # BLD AUTO: 0.03 10*3/MM3 (ref 0–0.2)
BASOPHILS NFR BLD AUTO: 0.6 % (ref 0–1.5)
BILIRUB SERPL-MCNC: 0.3 MG/DL (ref 0–1.2)
BILIRUB UR QL STRIP: NEGATIVE
BUN SERPL-MCNC: 24 MG/DL (ref 8–23)
BUN/CREAT SERPL: 21.1 (ref 7–25)
CALCIUM SERPL-MCNC: 9.8 MG/DL (ref 8.6–10.5)
CASTS URNS MICRO: ABNORMAL
CHLORIDE SERPL-SCNC: 102 MMOL/L (ref 98–107)
CHOLEST SERPL-MCNC: 356 MG/DL (ref 0–200)
CO2 SERPL-SCNC: 33.3 MMOL/L (ref 22–29)
COLOR UR: YELLOW
CREAT SERPL-MCNC: 1.14 MG/DL (ref 0.57–1)
EGFRCR SERPLBLD CKD-EPI 2021: 51.9 ML/MIN/1.73
EOSINOPHIL # BLD AUTO: 0.08 10*3/MM3 (ref 0–0.4)
EOSINOPHIL NFR BLD AUTO: 1.7 % (ref 0.3–6.2)
EPI CELLS #/AREA URNS HPF: ABNORMAL /HPF
ERYTHROCYTE [DISTWIDTH] IN BLOOD BY AUTOMATED COUNT: 15 % (ref 12.3–15.4)
GLOBULIN SER CALC-MCNC: 2.6 GM/DL
GLUCOSE SERPL-MCNC: 122 MG/DL (ref 65–99)
GLUCOSE UR QL STRIP: NEGATIVE
HBA1C MFR BLD: 6.1 % (ref 4.8–5.6)
HCT VFR BLD AUTO: 37.2 % (ref 34–46.6)
HDLC SERPL-MCNC: 36 MG/DL (ref 40–60)
HGB BLD-MCNC: 11.8 G/DL (ref 12–15.9)
HGB UR QL STRIP: ABNORMAL
IMM GRANULOCYTES # BLD AUTO: 0.03 10*3/MM3 (ref 0–0.05)
IMM GRANULOCYTES NFR BLD AUTO: 0.6 % (ref 0–0.5)
KETONES UR QL STRIP: NEGATIVE
LDLC SERPL CALC-MCNC: 198 MG/DL (ref 0–100)
LEUKOCYTE ESTERASE UR QL STRIP: ABNORMAL
LYMPHOCYTES # BLD AUTO: 1.85 10*3/MM3 (ref 0.7–3.1)
LYMPHOCYTES NFR BLD AUTO: 38.5 % (ref 19.6–45.3)
MCH RBC QN AUTO: 30.1 PG (ref 26.6–33)
MCHC RBC AUTO-ENTMCNC: 31.7 G/DL (ref 31.5–35.7)
MCV RBC AUTO: 94.9 FL (ref 79–97)
MONOCYTES # BLD AUTO: 0.54 10*3/MM3 (ref 0.1–0.9)
MONOCYTES NFR BLD AUTO: 11.3 % (ref 5–12)
NEUTROPHILS # BLD AUTO: 2.27 10*3/MM3 (ref 1.7–7)
NEUTROPHILS NFR BLD AUTO: 47.3 % (ref 42.7–76)
NITRITE UR QL STRIP: NEGATIVE
NRBC BLD AUTO-RTO: 0.2 /100 WBC (ref 0–0.2)
PH UR STRIP: 8 [PH] (ref 5–8)
PLATELET # BLD AUTO: 176 10*3/MM3 (ref 140–450)
POTASSIUM SERPL-SCNC: 3 MMOL/L (ref 3.5–5.2)
PROT SERPL-MCNC: 6.1 G/DL (ref 6–8.5)
PROT UR QL STRIP: ABNORMAL
RBC # BLD AUTO: 3.92 10*6/MM3 (ref 3.77–5.28)
RBC #/AREA URNS HPF: ABNORMAL /HPF
SODIUM SERPL-SCNC: 145 MMOL/L (ref 136–145)
SP GR UR STRIP: 1.02 (ref 1–1.03)
TRIGL SERPL-MCNC: 567 MG/DL (ref 0–150)
TSH SERPL DL<=0.005 MIU/L-ACNC: 2.18 UIU/ML (ref 0.27–4.2)
UROBILINOGEN UR STRIP-MCNC: ABNORMAL MG/DL
VLDLC SERPL CALC-MCNC: 122 MG/DL (ref 5–40)
WBC # BLD AUTO: 4.8 10*3/MM3 (ref 3.4–10.8)
WBC #/AREA URNS HPF: ABNORMAL /HPF

## 2024-01-23 NOTE — PROGRESS NOTES
Physical Therapy Treatment Note and 30 Day Progress Note  [address] [place] [address]    Patient: Tawny Shin                                                 Visit Date: 2024  :     1955    Referring practitioner:    Moises Oliveira MD  Date of Initial Visit:          Type: THERAPY  Noted: 2023    Patient seen for 54 sessions    Visit Diagnoses:    ICD-10-CM ICD-9-CM   1. Functional neurological symptom disorder with weakness or paralysis  F44.4 300.11   2. Lower paraplegia  G82.20 344.1       SUBJECTIVE     Subjective: Pt reports that she feels as though she has definitely made progress with PTx. She has noted less swelling in B feet when she wakes up since we applied kinesiotape. Pt is now having an easier time with transfers between chairs, as well as walking short distances. Feels like she still needs the most work on walking and UE strength. Reports 52% improvement since IE. Continues to struggle with reaching into cabinets due to inability to stand for long or come up on toes.       PAIN: 1-2/10     OBJECTIVE     Objective     Therapeutic Activities    [id number] Comments   Goals assessed for PN    TUG Trial 1: 1:50  Trial 2: unable to complete; got almost to the chair and legs got tired and gave out on her               Timed Minutes 35     Manual Therapy     [id number]  Comments   Manual B hamstring stretches with manual OP     Manual B gastroc stretches with manual OP     L great toe mobilizations L great toe ext 30 deg           Timed Minutes 10          Therapy Education/Self Care [id number]   Education offered today Discussed working toward improved gait mechanics at future sessions    [employer] Code [id number]   Ongoing HEP   [id number]      Date: 10/12/2023  Prepared by: Maria R Hicks     Bed:  - Prone Knee Extension with Ankle Weight  - 2-3 x daily - 7 x weekly - 30-60 seconds hold  - Supine Bridge  - 2 x daily  - 7 x weekly - 2 sets - 10 reps  - Supine March  - 1-2 x daily - 7 x weekly - 2 sets - 10 reps  - Supine Active Straight Leg Raise  - 1-2 x daily - 7 x weekly - 2 sets - 10 reps     Chair  - Seated Hamstring Stretch with Chair  - 1-2 x daily - 7 x weekly - 2 sets - 10 reps  - Belt Assisted Dorsiflexion   - 1-2 x daily - 7 x weekly - 2 sets - 10 reps  - Standing Bilateral Gastroc Stretch with Step  - 1-2 x daily - 7 x weekly - 2 sets - 10 reps  - Seated Marching with Opposite Shoulder Flexion  - 1-2 x daily - 7 x weekly - 2 sets - 10 reps  - Seated Long Arc Quad  - 1-2 x daily - 7 x weekly - 2 sets - 10 reps  - Seated Gluteal Sets  - 1-2 x daily - 7 x weekly - 2 sets - 10 reps  - seated clamshells  - 1-2 x daily - 7 x weekly - 2 sets - 10 reps   Timed Minutes      Total Timed Treatment:     45   mins  Total Time of Visit:             45   mins         ASSESSMENT/PLAN     GOALS  Goals                                            Progress Note due by 2/23//2024                                                             Recert due by 2/28/2024   LTG by: 12 weeks Comments Date Status   Patient will report compliance with HEP.  3x/week  1/23 ongoing   Pt will be able to maintain static standing w/ 50/50 Wbing for >/=3 mins w/o UE support/a to improve capacity for ADL/IADLs 1.11.75  :35.23  :53.49  Deferred to next visit  1/23 ongoing   Patient to perform TUG in </=2 mins w/ FWW without LOB for improved capacity for household ambulation. [place] 1:1:36.55  [place] 2: 1:45.73  1/23:  Trial 1: 1:50   Trial 2: unable to complete  1/23 ongoing   Pt will be able to perform 5x STS w/ 50/50 Wbing in </=2 mins for improved functional strength 12.65 sec but not likely full 50/50  1/23: deferred d/t fatigue  1/23 Partially met;  ongoing   Improve R ankle DF to neutral 9/28/20023  3 deg from neutral PROM, no change   1 deg past neutral PROM  10/31 MET   Improve gross B LE strength to >/=4/5 See table  12/28 MET     Assessment & Plan        Assessment  Impairments: abnormal coordination, abnormal gait, abnormal muscle firing, abnormal muscle tone, abnormal or restricted ROM, activity intolerance, impaired balance, impaired physical strength, lacks appropriate home exercise program, pain with function, safety issue and weight-bearing intolerance   Functional limitations: carrying objects, lifting, sleeping, walking, pulling, pushing, uncomfortable because of pain, moving in bed, sitting, standing, stooping, reaching behind back, reaching overhead and unable to perform repetitive tasks   Prognosis: good    Plan  Therapy options: will be seen for skilled therapy services  Planned modality interventions: thermotherapy (hydrocollator packs), cryotherapy, low level laser therapy, TENS, dry needling, electrical stimulation/Tongan stimulation and ultrasound  Planned therapy interventions: abdominal trunk stabilization, manual therapy, ADL retraining, motor coordination training, balance/weight-bearing training, neuromuscular re-education, body mechanics training, postural training, soft tissue mobilization, spinal/joint mobilization, fine motor coordination training, flexibility, functional ROM exercises, strengthening, gait training, stretching, home exercise program, therapeutic activities, IADL retraining, joint mobilization and transfer training  Frequency: 2x week  Duration in weeks: 12  Treatment plan discussed with: PTA         ASSESSMENT: Goals assessed for Progress Note Today. No goals met today due to increased fatigue this date. She had difficulty completing second trial of TUG test this date, which led to deferment of 5xSTS and stand for max goals to next session.  It is encouraging that she is now having an easier time with transfers and short distance ambulation, though she is still unable to trust her legs. She has great difficulty standing to reach into cabinets still. The Pt would benefit from skilled PTx to decrease fall risk, improve  functional mobility, progress toward goals, and increase overall quality of life.      PLAN: Finish second half of progress note. Continue use of vibraflex, if indicated. Re-apply KT tape for edema in distal B LE. Re-apply if necessary.  Continue use of powerboost to posterior LE.     SIGNATURE: Cheryl Joyce PT, [place] License #: [id number]    Electronically Signed on 1/23/2024        [address] [facility] [place] [address]  [phone]

## 2024-01-25 ENCOUNTER — TREATMENT (OUTPATIENT)
Dept: PHYSICAL THERAPY | Facility: CLINIC | Age: 69
End: 2024-01-25
Payer: MEDICARE

## 2024-01-25 DIAGNOSIS — G82.20 LOWER PARAPLEGIA: ICD-10-CM

## 2024-01-25 DIAGNOSIS — F44.4 FUNCTIONAL NEUROLOGICAL SYMPTOM DISORDER WITH WEAKNESS OR PARALYSIS: Primary | ICD-10-CM

## 2024-01-25 PROCEDURE — 97110 THERAPEUTIC EXERCISES: CPT | Performed by: PHYSICAL THERAPIST

## 2024-01-25 NOTE — PROGRESS NOTES
Physical Therapy Treatment Note  [address] [place] [address]    Patient: Tawny Shin                                                 Visit Date: 2024  :     1955    Referring practitioner:    Moises Oliveira MD  Date of Initial Visit:          Type: THERAPY  Noted: 2023    Patient seen for 55 sessions    Visit Diagnoses:    ICD-10-CM ICD-9-CM   1. Functional neurological symptom disorder with weakness or paralysis  F44.4 300.11   2. Lower paraplegia  G82.20 344.1     SUBJECTIVE     Subjective:She reports bad pain in her L knee 7/10 R knee 4/10. No falls she reports having some trouble walking       PAIN: 7/10         OBJECTIVE     Objective     Therapeutic Exercises    72431 Units Comments   Applied sure prep and KT tape for edema x2 to B distal LE in octopus pattern       Stand for max x 3     B hamstring stretches in sitting               Timed Minutes 40        Therapy Education/Self Care [id number]   Education offered today    Rakesh Code    Ongoing [facility]   [id number]      Date: 10/12/2023  Prepared by: Maria R Hicks     Bed:  - Prone Knee Extension with Ankle Weight  - 2-3 x daily - 7 x weekly - 30-60 seconds hold  - Supine Bridge  - 2 x daily - 7 x weekly - 2 sets - 10 reps  - Supine March  - 1-2 x daily - 7 x weekly - 2 sets - 10 reps  - Supine Active Straight Leg Raise  - 1-2 x daily - 7 x weekly - 2 sets - 10 reps     Chair  - Seated Hamstring Stretch with Chair  - 1-2 x daily - 7 x weekly - 2 sets - 10 reps  - Belt Assisted Dorsiflexion   - 1-2 x daily - 7 x weekly - 2 sets - 10 reps  - Standing Bilateral Gastroc Stretch with Step  - 1-2 x daily - 7 x weekly - 2 sets - 10 reps  - Seated Marching with Opposite Shoulder Flexion  - 1-2 x daily - 7 x weekly - 2 sets - 10 reps  - Seated Long Arc Quad  - 1-2 x daily - 7 x weekly - 2 sets - 10 reps  - Seated Gluteal Sets  - 1-2 x daily - 7 x weekly - 2 sets -  10 reps  - seated clamshells  - 1-2 x daily - 7 x weekly - 2 sets - 10 reps   Timed Minutes        Total Timed Treatment:     40   mins  Total Time of Visit:             40   mins         ASSESSMENT/PLAN     GOALS  Goals                                            Progress Note due by 2/23//2024                                                             Recert due by 2/28/2024   LTG by: 12 weeks Comments Date Status   Patient will report compliance with HEP.  3x/week  1/23 ongoing   Pt will be able to maintain static standing w/ 50/50 Wbing for >/=3 mins w/o UE support/a to improve capacity for ADL/IADLs 2:16.74  :42.47 sec  :59.16 1/23 ongoing   Patient to perform TUG in </=2 mins w/ FWW without LOB for improved capacity for household ambulation. [place] 1:1:36.55  [place] 2: 1:45.73  1/23:  Trial 1: 1:50   Trial 2: unable to complete  1/23 ongoing   Pt will be able to perform 5x STS w/ 50/50 Wbing in </=2 mins for improved functional strength  1/25 Partially met;  ongoing   Improve R ankle DF to neutral 9/28/20023  3 deg from neutral PROM, no change   1 deg past neutral PROM  10/31 MET   Improve gross B LE strength to >/=4/5 See table  12/28 MET       Assessment/Plan     ASSESSMENT:   She requested to have the KT applied as it had really helped with her swelling which in turn is improving her mobility at home.    PLAN:   Consider having her perform gait training for maximum distance x 3 reps following with WC.     SIGNATURE: Kentrell Lamb PTA, [place] License #: [id number]  Electronically Signed on 1/25/2024        [address] [place] [address] [place] [address]  [phone]

## 2024-01-29 DIAGNOSIS — R07.9 CHEST PAIN AT REST: Primary | ICD-10-CM

## 2024-01-29 RX ORDER — VALACYCLOVIR HYDROCHLORIDE 500 MG/1
500 TABLET, FILM COATED ORAL DAILY
Qty: 90 TABLET | Refills: 3 | Status: SHIPPED | OUTPATIENT
Start: 2024-01-29

## 2024-01-29 RX ORDER — DULOXETIN HYDROCHLORIDE 60 MG/1
60 CAPSULE, DELAYED RELEASE ORAL DAILY
Qty: 90 CAPSULE | Refills: 3 | Status: SHIPPED | OUTPATIENT
Start: 2024-01-29

## 2024-01-29 RX ORDER — FOLIC ACID 1 MG/1
1000 TABLET ORAL DAILY
Qty: 90 TABLET | Refills: 3 | Status: SHIPPED | OUTPATIENT
Start: 2024-01-29

## 2024-01-29 RX ORDER — FAMOTIDINE 40 MG/1
40 TABLET, FILM COATED ORAL DAILY
Qty: 90 TABLET | Refills: 3 | OUTPATIENT
Start: 2024-01-29

## 2024-01-29 RX ORDER — ISOSORBIDE MONONITRATE 60 MG/1
60 TABLET, EXTENDED RELEASE ORAL EVERY MORNING
Qty: 90 TABLET | Refills: 3 | Status: SHIPPED | OUTPATIENT
Start: 2024-01-29

## 2024-01-29 NOTE — PROGRESS NOTES
The continued odor could be related to multiple factors like chronic colonization of urine and/or metabolites of medications she is on. Most importantly she needs to drink adequate amount of water daily (at least 64 oz./daily) since the suppositories did not help I would not continue with these. Another agent hat we can try, no guarantee it will work but there should be little risk involved in trying is taking the OTC bladder support supplement called Uqora the DEFEND supplement (they make multiple different ones so ensure that if she tries one it is the one labeled Defend).

## 2024-01-31 RX ORDER — FAMOTIDINE 40 MG/1
40 TABLET, FILM COATED ORAL DAILY
Qty: 90 TABLET | Refills: 3 | OUTPATIENT
Start: 2024-01-31

## 2024-02-02 ENCOUNTER — DOCUMENTATION (OUTPATIENT)
Dept: CARDIOLOGY | Facility: CLINIC | Age: 69
End: 2024-02-02
Payer: MEDICARE

## 2024-02-02 NOTE — PROGRESS NOTES
The patient has been approved for Repatha with her insurance.  This will be good through 12/31/2024.

## 2024-02-06 ENCOUNTER — TELEPHONE (OUTPATIENT)
Dept: CARDIOLOGY | Facility: CLINIC | Age: 69
End: 2024-02-06
Payer: MEDICARE

## 2024-02-06 ENCOUNTER — TREATMENT (OUTPATIENT)
Dept: PHYSICAL THERAPY | Facility: CLINIC | Age: 69
End: 2024-02-06
Payer: MEDICARE

## 2024-02-06 DIAGNOSIS — I10 ESSENTIAL HYPERTENSION: ICD-10-CM

## 2024-02-06 DIAGNOSIS — F44.4 FUNCTIONAL NEUROLOGICAL SYMPTOM DISORDER WITH WEAKNESS OR PARALYSIS: Primary | ICD-10-CM

## 2024-02-06 DIAGNOSIS — G82.20 LOWER PARAPLEGIA: ICD-10-CM

## 2024-02-06 RX ORDER — CARVEDILOL 25 MG/1
25 TABLET ORAL 2 TIMES DAILY WITH MEALS
Qty: 180 TABLET | Refills: 3 | Status: SHIPPED | OUTPATIENT
Start: 2024-02-06

## 2024-02-06 RX ORDER — FUROSEMIDE 40 MG/1
40 TABLET ORAL DAILY
Qty: 90 TABLET | Refills: 3 | Status: SHIPPED | OUTPATIENT
Start: 2024-02-06

## 2024-02-06 NOTE — TELEPHONE ENCOUNTER
----- Message from DANIELA Garcia sent at 1/29/2024  1:44 PM CST -----  Ok thanks, I ordered the DSE for further evaluation of symptoms. Please save notes to chart.  ----- Message -----  From: Christos Chakraborty MA  Sent: 1/29/2024   1:37 PM CST  To: DANIELA Garcia    I contacted the patient regarding the below.  She stated she has not had any further chest discomfort, however she has had at least 2 to 3 episodes of left arm discomfort since she saw you last.      Also, we were able to get the Repatha approved with her insurance.  The pharmacy has been out of it however she is going today to pick it up as they now have it back in stock.  Thanks.   ----- Message -----  From: Ximena Vazquez APRN  Sent: 1/26/2024  11:15 AM CST  To: Christos Chakraborty MA    Please contact the patient and ask her if she has had any further chest discomfort or left arm discomfort since her visit.  If she is completely asymptomatic, no further testing but if she is having recurrent symptoms I will plan to order a dobutamine stress echocardiogram.  Let me know.  Thanks.

## 2024-02-06 NOTE — PROGRESS NOTES
Physical Therapy Treatment Note  [address] [place] [address]    Patient: Tawny Shin                                                 Visit Date: 2024  :     1955    Referring practitioner:    Moises Oliveira MD  Date of Initial Visit:          No linked episodes    Patient seen for Visit count could not be calculated. Make sure you are using a visit which is associated with an episode. sessions    Visit Diagnoses:  No diagnosis found.    SUBJECTIVE     Subjective:     PAIN: /10         OBJECTIVE     Objective     Therapeutic Exercises    81315 Units Comments                            Timed Minutes 40        Therapy Education/Self Care 96340   Education offered today    [facility] Code [id number]       Ongoing HEP     Date: 10/12/2023  Prepared by: Maria R Hicks     Bed:  - Prone Knee Extension with Ankle Weight  - 2-3 x daily - 7 x weekly - 30-60 seconds hold  - Supine Bridge  - 2 x daily - 7 x weekly - 2 sets - 10 reps  - Supine March  - 1-2 x daily - 7 x weekly - 2 sets - 10 reps  - Supine Active Straight Leg Raise  - 1-2 x daily - 7 x weekly - 2 sets - 10 reps     Chair  - Seated Hamstring Stretch with Chair  - 1-2 x daily - 7 x weekly - 2 sets - 10 reps  - Belt Assisted Dorsiflexion   - 1-2 x daily - 7 x weekly - 2 sets - 10 reps  - Standing Bilateral Gastroc Stretch with Step  - 1-2 x daily - 7 x weekly - 2 sets - 10 reps  - Seated Marching with Opposite Shoulder Flexion  - 1-2 x daily - 7 x weekly - 2 sets - 10 reps  - Seated Long Arc Quad  - 1-2 x daily - 7 x weekly - 2 sets - 10 reps  - Seated Gluteal Sets  - 1-2 x daily - 7 x weekly - 2 sets - 10 reps  - seated clamshells  - 1-2 x daily - 7 x weekly - 2 sets - 10 reps   Timed Minutes        Total Timed Treatment:        mins  Total Time of Visit:                mins         ASSESSMENT/PLAN     GOALS  Goals                                            Progress  Note due by 2/23//2024                                                             Recert due by 2/28/2024   LTG by: 12 weeks Comments Date Status   Patient will report compliance with HEP.  3x/week  1/23 ongoing   Pt will be able to maintain static standing w/ 50/50 Wbing for >/=3 mins w/o UE support/a to improve capacity for ADL/IADLs 2:16.74  :42.47 sec  :59.16 1/23 ongoing   Patient to perform TUG in </=2 mins w/ FWW without LOB for improved capacity for household ambulation. [place] 1:1:36.55  [place] 2: 1:45.73  1/23:  Trial 1: 1:50   Trial 2: unable to complete  1/23 ongoing   Pt will be able to perform 5x STS w/ 50/50 Wbing in </=2 mins for improved functional strength  1/25 Partially met;  ongoing   Improve R ankle DF to neutral 9/28/20023  3 deg from neutral PROM, no change   1 deg past neutral PROM  10/31 MET   Improve gross B LE strength to >/=4/5 See table  12/28 MET       Assessment/Plan     ASSESSMENT:     PLAN: Consider having her perform gait training for maximum distance x 3 reps following with WC.     SIGNATURE: Molly Garrett PT DPT, [place] License #: [id number]  Electronically Signed on 2/6/2024        Donnie Cisneros. [id number]  [phone]

## 2024-02-06 NOTE — PROGRESS NOTES
Physical Therapy Treatment Note  [address] [place] [address]    Patient: Tawny Shin                                                 Visit Date: 2024  :     1955    Referring practitioner:    Moises Oliveira MD  Date of Initial Visit:          Type: THERAPY  Noted: 2023    Patient seen for 56 sessions    Visit Diagnoses:    ICD-10-CM ICD-9-CM   1. Functional neurological symptom disorder with weakness or paralysis  F44.4 300.11   2. Lower paraplegia  G82.20 344.1       SUBJECTIVE     Subjective: Pt reports that she has been sick with a stomach bug. The taping in her lower leg really helped to decrease swelling in BLEs. She is feeling weak today from the stomach bug. She has been sitting in her recliner a lot, and has felt week when walking between recliner and power chair.      PAIN: 4/10 L shoulder > decreased pain following treatment         OBJECTIVE     Objective     Therapeutic Activities    57378 Comments   Transfers between mat table and power chair  Using RW; CGA    Sit <> supine                Timed Minutes 8     Neuromuscular Reeducation     39374 Comments   Stability bridges with small PB  10x5'   Single leg stability bridges with small PB 10x5' ea                Timed Minutes 15     Therapeutic Exercises    27287 Units Comments   Supine windshield wipers with YTB below knees  2x5 ea  Pillow case on sliding board    DKTC bilateral heel slides X10  Pillow cases on sliding boards; manual assist RLE for full range    LAQ with 3# ankle weight L, 2# on R  X10 ea     Sitting unsupported B LAQ with hip adduction using balls at knees and ankles x10 Emphasis on core engagement for seated balance with this as well      Emphasized TrA contraction throughout   Timed Minutes 20          Therapy Education/Self Care 04259   Education offered today    [employer] Code    Ongoing HEP   [id number]      Date: 10/12/2023  Prepared by:  Maria R Garay     Exercises     Bed:  - Prone Knee Extension with Ankle Weight  - 2-3 x daily - 7 x weekly - 30-60 seconds hold  - Supine Bridge  - 2 x daily - 7 x weekly - 2 sets - 10 reps  - Supine March  - 1-2 x daily - 7 x weekly - 2 sets - 10 reps  - Supine Active Straight Leg Raise  - 1-2 x daily - 7 x weekly - 2 sets - 10 reps     Chair  - Seated Hamstring Stretch with Chair  - 1-2 x daily - 7 x weekly - 2 sets - 10 reps  - Belt Assisted Dorsiflexion   - 1-2 x daily - 7 x weekly - 2 sets - 10 reps  - Standing Bilateral Gastroc Stretch with Step  - 1-2 x daily - 7 x weekly - 2 sets - 10 reps  - Seated Marching with Opposite Shoulder Flexion  - 1-2 x daily - 7 x weekly - 2 sets - 10 reps  - Seated Long Arc Quad  - 1-2 x daily - 7 x weekly - 2 sets - 10 reps  - Seated Gluteal Sets  - 1-2 x daily - 7 x weekly - 2 sets - 10 reps  - seated clamshells  - 1-2 x daily - 7 x weekly - 2 sets - 10 reps   Timed Minutes        Total Timed Treatment:     43   mins  Total Time of Visit:             43   mins         ASSESSMENT/PLAN     GOALS  Goals                                            Progress Note due by 2/23//2024                                                             Recert due by 2/28/2024   LTG by: 12 weeks Comments Date Status   Patient will report compliance with HEP.  3x/week  1/23 ongoing   Pt will be able to maintain static standing w/ 50/50 Wbing for >/=3 mins w/o UE support/a to improve capacity for ADL/IADLs 2:16.74  :42.47 sec  :59.16 1/23 ongoing   Patient to perform TUG in </=2 mins w/ FWW without LOB for improved capacity for household ambulation. [place] 1:1:36.55  [place] 2: 1:45.73  1/23:  Trial 1: 1:50   Trial 2: unable to complete  1/23 ongoing   Pt will be able to perform 5x STS w/ 50/50 Wbing in </=2 mins for improved functional strength  1/25 Partially met;  ongoing   Improve R ankle DF to neutral 9/28/20023  3 deg from neutral PROM, no change   1 deg past neutral PROM  10/31 MET   Improve  gross B LE strength to >/=4/5 See table  12/28 MET       Assessment/Plan     ASSESSMENT: Focused on table exercises this date due to generalized fatigue from recent illness. Her STS transfers and w/c <> mat table transfers were improved today. During hip abduction activities, RLE external rotation increased as fatigue increased. Emphasized TrA activation with all exercises today.      PLAN:   Consider having her perform gait training for maximum distance x 3 reps following with WC.     SIGNATURE: Cheryl Joyce PT [facility], [place] License #: [id number]      Electronically Signed on 2/6/2024        [address] [place] [address]. [address]  [phone]

## 2024-02-07 ENCOUNTER — OFFICE VISIT (OUTPATIENT)
Age: 69
End: 2024-02-07
Payer: MEDICARE

## 2024-02-07 VITALS
HEART RATE: 76 BPM | OXYGEN SATURATION: 95 % | BODY MASS INDEX: 30.67 KG/M2 | SYSTOLIC BLOOD PRESSURE: 120 MMHG | HEIGHT: 66 IN | TEMPERATURE: 97.9 F | DIASTOLIC BLOOD PRESSURE: 69 MMHG

## 2024-02-07 DIAGNOSIS — M86.9 OSTEOMYELITIS OF RIGHT HIP: Primary | ICD-10-CM

## 2024-02-07 PROCEDURE — 3074F SYST BP LT 130 MM HG: CPT | Performed by: INTERNAL MEDICINE

## 2024-02-07 PROCEDURE — 3078F DIAST BP <80 MM HG: CPT | Performed by: INTERNAL MEDICINE

## 2024-02-07 PROCEDURE — 99213 OFFICE O/P EST LOW 20 MIN: CPT | Performed by: INTERNAL MEDICINE

## 2024-02-07 NOTE — PROGRESS NOTES
[facility] [place]    Patient:  Tawny Shin  YOB: 1955  MRN: [id number]   Primary Care Physician: Moises Oliveira MD  Referring Physician: Beni Urrutia, *     Chief Complaint:   Chief Complaint   Patient presents with    Osteomyelitis      Of the right hip, with pain sometimes especially if she lays on her right side     Interval History/HPI: She returns today for follow-up.  She had had previous MSSA infection involving the right hip.  She had been on chronic suppressive Keflex.  She continue Keflex up until around Thanksgiving.  She has been off Keflex subsequently.  She has not noticed any increasing right hip/thigh pain or discomfort.  She has not noticed redness or swelling.  She is working with physical therapy 2 times per week.  She indicates she is able to stand with a walker and she can maneuver to transfer.  She otherwise is using a motorized wheelchair.  She indicates physical therapy is trying to help her get to the point where she can walk again.  She follows with Dr. Oliveira for primary care.  She has not had diarrhea.  She has not had fever.    Allergies:   Allergies   Allergen Reactions    Atorvastatin Other (See Comments)     LEG CRAMPS      Amoxicillin Rash    Escitalopram Rash    Nabumetone Rash    Niacin Er Rash    Penicillin G Rash    Penicillins Rash    Simvastatin Rash     Current Scheduled Medications:   Current Outpatient Medications on File Prior to Visit   Medication Sig    acetaminophen (TYLENOL) 325 MG tablet Take 2 tablets by mouth Every 6 (Six) Hours As Needed (mild pain). Indications: Fever, Pain    apixaban (ELIQUIS) 5 MG tablet tablet Take 1 tablet by mouth 2 (Two) Times a Day. Indications: Other - full anticoagulation    aspirin 81 MG EC tablet Take 1 tablet by mouth Daily. Indications: Disease involving Lipid Deposits in the Arteries    carvedilol (COREG) 25 MG tablet TAKE ONE TABLET BY MOUTH TWICE A DAY WITH MEALS     Diclofenac Sodium (VOLTAREN) 1 % gel gel Apply 4 g topically to the appropriate area as directed 4 (Four) Times a Day As Needed. Indications: Joint Damage causing Pain and Loss of Function    DULoxetine (CYMBALTA) 60 MG capsule TAKE ONE CAPSULE BY MOUTH DAILY    ferrous sulfate 324 (65 Fe) MG tablet delayed-release EC tablet TAKE ONE TABLET BY MOUTH DAILY WITH BREAKFAST    fluticasone (FLONASE) 50 MCG/ACT nasal spray 1 spray into the nostril(s) as directed by provider Daily.    folic acid (FOLVITE) 1 MG tablet TAKE ONE TABLET BY MOUTH DAILY    furosemide (LASIX) 40 MG tablet TAKE ONE TABLET BY MOUTH DAILY    isosorbide mononitrate (IMDUR) 60 MG 24 hr tablet TAKE ONE TABLET BY MOUTH EVERY MORNING    leflunomide (ARAVA) 20 MG tablet Take 1 tablet by mouth every night at bedtime. Indications: Rheumatoid Arthritis    levothyroxine (SYNTHROID, LEVOTHROID) 75 MCG tablet TAKE ONE TABLET BY MOUTH DAILY    Lidocaine 4 % patch Apply  topically At Night As Needed for Moderate Pain. To lower back  Indications: Arthritis Related to an Inflammatory Disorder    losartan (COZAAR) 50 MG tablet TAKE 1 TABLET BY MOUTH DAILY    multivitamin with minerals tablet tablet Take 1 tablet by mouth Daily.    predniSONE (DELTASONE) 5 MG tablet Take 1 tablet by mouth Daily. Indications: Acute Bursitis    sucralfate (CARAFATE) 1 g tablet TAKE ONE TABLET BY MOUTH FOUR TIMES A DAY BEFORE MEALS AND AT BEDTIME    traMADol (ULTRAM) 50 MG tablet Take 1 tablet by mouth Every 6 (Six) Hours As Needed for Moderate Pain.    valACYclovir (VALTREX) 500 MG tablet TAKE ONE TABLET BY MOUTH DAILY    Vibegron 75 MG tablet Take 1 tablet by mouth Daily.    Evolocumab (REPATHA) solution auto-injector SureClick injection Inject 1 mL under the skin into the appropriate area as directed Every 14 (Fourteen) Days. (Patient not taking: Reported on 2/7/2024)    magnesium hydroxide (MILK OF MAGNESIA) 400 MG/5ML suspension Take 30 mL by mouth Daily As Needed for Constipation.  "Indications: Constipation    Menthol-Zinc Oxide 0.45-20 % ointment Apply 1 application  topically 2 (Two) Times a Day. Apply to right buttock    nitroglycerin (Nitrostat) 0.4 MG SL tablet Place 1 tablet under the tongue Every 5 (Five) Minutes As Needed for Chest Pain. Take no more than 3 doses in 15 minutes. (Patient not taking: Reported on 2/7/2024)    ondansetron (Zofran) 4 MG tablet Take 1 tablet by mouth Every 8 (Eight) Hours As Needed for Nausea or Vomiting. (Patient not taking: Reported on 2/7/2024)    salsalate (DISALCID) 500 MG tablet TAKE 5 TABLETS BY MOUTH ON MONDAY, WEDNESDAY, AND FRIDAY AND TAKE 4 TABLETS BY MOUTH ON TUESDAY, THURSDAY, AND SATURDAY (Patient not taking: Reported on 2/7/2024)     No current facility-administered medications on file prior to visit.      Venous Access Review  Line/IV site: No current IV Access    Antimicrobial Review  Currently on antibiotics/antifungals: YES/NO: YES  Start Date of Therapy: Cephalexin 7/20/22 ( patient states that she has been off since fall of 2023)   Cefazolin 6/14/22-7/19/22  If therapy completed, date complete: unknown    Review of Systems See HPI.    Vital Signs:  /69   Pulse 76   Temp 97.9 °F (36.6 °C) (Temporal)   Ht 167.6 cm (66\")   LMP  (LMP Unknown)   SpO2 95%   BMI 30.67 kg/m²     Physical Exam  Vital signs - reviewed.  Prior right hip incision well-healed.  No erythema, warmth, induration, tenderness, or fluctuance.  There is a soft tissue defect in the right thigh from her previous wound and surgeries, but everything is well-healed with no open area at this time.  There is no tenderness to palpation.  There is no external exam evidence to suggest persistent infection.    Lab/Imaging/Other Information: None    Impression & Recommendations:   Diagnoses and all orders for this visit:    1. Osteomyelitis of right hip (Primary)    She had had previous osteomyelitis involving the right hip.  Had been planning chronic suppression with " Keflex.  She has now been off Keflex for a little more than 2 months without symptomatic or physical exam evidence suggestive of recurrent/persistent symptomatic infection.  Hard to know the best approach at this point, but since she seems to be doing well off antibiotic treatment would lean toward continuing off antibiotic therapy at this time.  She was comfortable with that approach.  Offered ongoing follow-up.  She has ongoing follow-up with both Dr. Oliveira her primary care physician and also Dr. Chaidez her rheumatologist.  I would be happy to reassess if she or any of her other treating physicians have any concerns.  She can otherwise follow-up with me as needed.    Follow Up:   Patient Instructions   Keep follow up with Dr. Oliveira  Return if symptoms worsen or fail to improve.  Patient was provided After Visit Summary.     MD Moises Foley MD Christopher Phillips, MD

## 2024-02-07 NOTE — PATIENT INSTRUCTIONS
Keep follow up with Dr. Oliveira   You were seen in the emergency department today for vertigo.  We tried to perform an MRI of the brain for stroke, but you left against medical advice.  Please follow-up with a neurologist in 1 week to make sure your vertigo symptoms are improving.  You may need physical therapy for vestibular rehab so therefore please call your family doctor in 3 to 5 days to schedule outpatient rehab.  Please take the meclizine as prescribed. If you develop any slurred speech or weakness, please immediately return to the ER.    FOR PAIN:  1) TAKE TWO EXTRA STRENGTH TYLENOL TABLETS EVERY 8 HOURS AS NEEDED.

## 2024-02-08 ENCOUNTER — TREATMENT (OUTPATIENT)
Dept: PHYSICAL THERAPY | Facility: CLINIC | Age: 69
End: 2024-02-08
Payer: MEDICARE

## 2024-02-08 DIAGNOSIS — F44.4 FUNCTIONAL NEUROLOGICAL SYMPTOM DISORDER WITH WEAKNESS OR PARALYSIS: Primary | ICD-10-CM

## 2024-02-08 NOTE — PROGRESS NOTES
"                                                                Physical Therapy Treatment Note  [address] [place] [address]    Patient: Tawny Shin                                                 Visit Date: 2024  :     1955    Referring practitioner:    Moises Oliveira MD  Date of Initial Visit:          Type: THERAPY  Noted: 2023    Patient seen for 57 sessions    Visit Diagnoses:    ICD-10-CM ICD-9-CM   1. Functional neurological symptom disorder with weakness or paralysis  F44.4 300.11     SUBJECTIVE     Subjective: Pt reports that her hands are hurting today, reports it's her OA. Reports she is feeling a lot better today, she was still recovering from stomach bug and felt really weak last session. She reports that she is having the most trouble getting her shoes on.     PAIN: 3/10 (B hands)     OBJECTIVE     Objective     Gait Training          [id number]   Task/Terrain Asst AD Comments   Amb in hallway CGA FWW Trial 1: 52 sec; 13'8\"  Trial 2: 39 sec; 13'4\"  Trial 3: 29 sec; 10'11\"         Timed Minutes 25     Therapeutic Activities    64342 Comments   5x STS See goals   Applied sure prep and KT tape to B distal LE in octopus pattern for swelling and edema        Timed Minutes 15     Therapy Education/Self Care 43879   Education offered today    [employer] Code    Ongoing [facility]   [id number]      Date: 10/12/2023  Prepared by: Maria R Hicks     Bed:  - Prone Knee Extension with Ankle Weight  - 2-3 x daily - 7 x weekly - 30-60 seconds hold  - Supine Bridge  - 2 x daily - 7 x weekly - 2 sets - 10 reps  - Supine March  - 1-2 x daily - 7 x weekly - 2 sets - 10 reps  - Supine Active Straight Leg Raise  - 1-2 x daily - 7 x weekly - 2 sets - 10 reps     Chair  - Seated Hamstring Stretch with Chair  - 1-2 x daily - 7 x weekly - 2 sets - 10 reps  - Belt Assisted Dorsiflexion   - 1-2 x daily - 7 x weekly - 2 sets - 10 reps  - Standing Bilateral Gastroc Stretch with Step  - 1-2 x daily - " "7 x weekly - 2 sets - 10 reps  - Seated Marching with Opposite Shoulder Flexion  - 1-2 x daily - 7 x weekly - 2 sets - 10 reps  - Seated Long Arc Quad  - 1-2 x daily - 7 x weekly - 2 sets - 10 reps  - Seated Gluteal Sets  - 1-2 x daily - 7 x weekly - 2 sets - 10 reps  - seated clamshells  - 1-2 x daily - 7 x weekly - 2 sets - 10 reps   Timed Minutes      Total Timed Treatment:     40   mins  Total Time of Visit:             45   mins         ASSESSMENT/PLAN     GOALS  Goals                                            Progress Note due by 2/23/2024                                                             Recert due by 2/28/2024   LTG by: 12 weeks Comments Date Status   Patient will report compliance with HEP.  3x/week  1/23 ongoing   Pt will be able to maintain static standing w/ 50/50 Wbing for >/=3 mins w/o UE support/a to improve capacity for ADL/IADLs 2:16.74  :42.47 sec  :59.16 1/23 ongoing   Patient to perform TUG in </=2 mins w/ FWW without LOB for improved capacity for household ambulation. [place] 1:1:36.55  [place] 2: 1:45.73  1/23:  Trial 1: 1:50   Trial 2: unable to complete  1/23 ongoing   Pt will be able to perform 5x STS w/ 50/50 Wbing in </=2 mins for improved functional strength [place] 1:1:36.55  [place] 2: 1:45.73  1/23/24:  Trial 1: 1:50   Trial 2: unable to complete   2/8/24: asymmetrical WB  Trial 1: 19.43 sec  Trial 2: 15.02 sec  2/8 Partially met;  improving   Improve R ankle DF to neutral 9/28/20023  3 deg from neutral PROM, no change   1 deg past neutral PROM  10/31 MET   Improve gross B LE strength to >/=4/5 See table  12/28 MET     Assessment/Plan     ASSESSMENT: amb for max distance and time in the hallway today, allowing adequate rest breaks to allow pt to recover before attempting next rep. She was able to complete within 4\" of the same distance with first two reps, though decrease time by 13 sec. She did fatigue significantly during the last rep and was only able to ambulate 10'11\", " tolerating only 29 seconds of ambulation. She did report feeling that the KT tape for inflammation and edema was very effective, therefore re-applied today. She was able to shave over one minute off her STS time today, though compensations with WB were present.     PLAN: Consider prioritizing WB over the next few sessions, especially with use of neurocom and maybe BITS to help pt to overcome mental block associated with symmetrical WB.     SIGNATURE: Maria R Garay PTA, [place] License #: [id number]  Electronically Signed on 2/8/2024        [address] [place] [address] [place]. [address]  [phone]

## 2024-02-09 ENCOUNTER — HOSPITAL ENCOUNTER (OUTPATIENT)
Dept: CARDIOLOGY | Facility: HOSPITAL | Age: 69
Discharge: HOME | End: 2024-02-09
Payer: MEDICARE

## 2024-02-09 VITALS
DIASTOLIC BLOOD PRESSURE: 64 MMHG | SYSTOLIC BLOOD PRESSURE: 125 MMHG | BODY MASS INDEX: 30.53 KG/M2 | HEART RATE: 75 BPM | HEIGHT: 66 IN | WEIGHT: 190 LBS

## 2024-02-09 DIAGNOSIS — R07.9 CHEST PAIN AT REST: ICD-10-CM

## 2024-02-09 LAB
BH CV STRESS BP STAGE 1: NORMAL
BH CV STRESS BP STAGE 2: NORMAL
BH CV STRESS BP STAGE 3: NORMAL
BH CV STRESS BP STAGE 4: NORMAL
BH CV STRESS BP STAGE 5: NORMAL
BH CV STRESS DOB - ATROPINE STAGE 3: 1
BH CV STRESS DOB - ATROPINE STAGE 4: 1
BH CV STRESS DOSE DOBUTAMINE STAGE 1: 10
BH CV STRESS DOSE DOBUTAMINE STAGE 2: 20
BH CV STRESS DOSE DOBUTAMINE STAGE 3: 30
BH CV STRESS DOSE DOBUTAMINE STAGE 4: 40
BH CV STRESS DOSE DOBUTAMINE STAGE 5: 50
BH CV STRESS DURATION MIN STAGE 1: 3
BH CV STRESS DURATION MIN STAGE 2: 3
BH CV STRESS DURATION MIN STAGE 3: 3
BH CV STRESS DURATION MIN STAGE 4: 3
BH CV STRESS DURATION MIN STAGE 5: 3
BH CV STRESS DURATION SEC STAGE 1: 0
BH CV STRESS DURATION SEC STAGE 2: 0
BH CV STRESS DURATION SEC STAGE 3: 0
BH CV STRESS DURATION SEC STAGE 4: 0
BH CV STRESS DURATION SEC STAGE 5: 0
BH CV STRESS HR STAGE 1: 68
BH CV STRESS HR STAGE 2: 85
BH CV STRESS HR STAGE 3: 100
BH CV STRESS HR STAGE 4: 111
BH CV STRESS HR STAGE 5: 119
BH CV STRESS PROTOCOL 1: NORMAL
BH CV STRESS RECOVERY BP: NORMAL MMHG
BH CV STRESS RECOVERY HR: 90 BPM
BH CV STRESS STAGE 1: 1
BH CV STRESS STAGE 2: 2
BH CV STRESS STAGE 3: 3
BH CV STRESS STAGE 4: 4
BH CV STRESS STAGE 5: 5
MAXIMAL PREDICTED HEART RATE: 150 BPM
PERCENT MAX PREDICTED HR: 79.33 %
STRESS BASELINE BP: NORMAL MMHG
STRESS BASELINE HR: 75 BPM
STRESS PERCENT HR: 93 %
STRESS POST EXERCISE DUR MIN: 15 MIN
STRESS POST EXERCISE DUR SEC: 0 SEC
STRESS POST PEAK BP: NORMAL MMHG
STRESS POST PEAK HR: 119 BPM
STRESS TARGET HR: 128 BPM

## 2024-02-09 PROCEDURE — 93017 CV STRESS TEST TRACING ONLY: CPT

## 2024-02-09 PROCEDURE — 93350 STRESS TTE ONLY: CPT

## 2024-02-09 PROCEDURE — 25010000002 ATROPINE SULFATE: Performed by: INTERNAL MEDICINE

## 2024-02-09 PROCEDURE — 25010000002 DOBUTAMINE 250 MG/20ML SOLUTION: Performed by: INTERNAL MEDICINE

## 2024-02-09 PROCEDURE — 25510000001 PERFLUTREN 6.52 MG/ML SUSPENSION: Performed by: INTERNAL MEDICINE

## 2024-02-09 RX ORDER — METOPROLOL TARTRATE 1 MG/ML
5 INJECTION, SOLUTION INTRAVENOUS ONCE
Status: DISCONTINUED | OUTPATIENT
Start: 2024-02-09 | End: 2024-02-10 | Stop reason: HOSPADM

## 2024-02-09 RX ORDER — DOBUTAMINE HYDROCHLORIDE 100 MG/100ML
10-50 INJECTION INTRAVENOUS CONTINUOUS
Status: DISCONTINUED | OUTPATIENT
Start: 2024-02-09 | End: 2024-02-10 | Stop reason: HOSPADM

## 2024-02-09 RX ADMIN — ATROPINE SULFATE 2 MG: 0.1 INJECTION INTRAVENOUS at 10:29

## 2024-02-09 RX ADMIN — PERFLUTREN 8.48 MG: 6.52 INJECTION, SUSPENSION INTRAVENOUS at 10:09

## 2024-02-09 RX ADMIN — DOBUTAMINE 10 MCG/KG/MIN: 12.5 INJECTION, SOLUTION, CONCENTRATE INTRAVENOUS at 10:19

## 2024-02-13 ENCOUNTER — TREATMENT (OUTPATIENT)
Dept: PHYSICAL THERAPY | Facility: CLINIC | Age: 69
End: 2024-02-13
Payer: MEDICARE

## 2024-02-13 DIAGNOSIS — G82.20 LOWER PARAPLEGIA: ICD-10-CM

## 2024-02-13 DIAGNOSIS — F44.4 FUNCTIONAL NEUROLOGICAL SYMPTOM DISORDER WITH WEAKNESS OR PARALYSIS: Primary | ICD-10-CM

## 2024-02-13 PROCEDURE — 97140 MANUAL THERAPY 1/> REGIONS: CPT | Performed by: PHYSICAL THERAPIST

## 2024-02-13 PROCEDURE — 97116 GAIT TRAINING THERAPY: CPT | Performed by: PHYSICAL THERAPIST

## 2024-02-14 RX ORDER — APIXABAN 5 MG/1
5 TABLET, FILM COATED ORAL 2 TIMES DAILY
Qty: 180 TABLET | Refills: 3 | Status: SHIPPED | OUTPATIENT
Start: 2024-02-14

## 2024-02-15 ENCOUNTER — TREATMENT (OUTPATIENT)
Dept: PHYSICAL THERAPY | Facility: CLINIC | Age: 69
End: 2024-02-15
Payer: MEDICARE

## 2024-02-15 DIAGNOSIS — F44.4 FUNCTIONAL NEUROLOGICAL SYMPTOM DISORDER WITH WEAKNESS OR PARALYSIS: Primary | ICD-10-CM

## 2024-02-15 DIAGNOSIS — G82.20 LOWER PARAPLEGIA: ICD-10-CM

## 2024-02-15 PROCEDURE — 97140 MANUAL THERAPY 1/> REGIONS: CPT | Performed by: PHYSICAL THERAPIST

## 2024-02-15 PROCEDURE — 97116 GAIT TRAINING THERAPY: CPT | Performed by: PHYSICAL THERAPIST

## 2024-02-19 RX ORDER — PREDNISONE 5 MG/1
5 TABLET ORAL DAILY
Qty: 90 TABLET | Refills: 3 | Status: SHIPPED | OUTPATIENT
Start: 2024-02-19

## 2024-02-20 ENCOUNTER — TREATMENT (OUTPATIENT)
Dept: PHYSICAL THERAPY | Facility: CLINIC | Age: 69
End: 2024-02-20
Payer: MEDICARE

## 2024-02-20 DIAGNOSIS — G82.20 LOWER PARAPLEGIA: ICD-10-CM

## 2024-02-20 DIAGNOSIS — F44.4 FUNCTIONAL NEUROLOGICAL SYMPTOM DISORDER WITH WEAKNESS OR PARALYSIS: Primary | ICD-10-CM

## 2024-02-20 NOTE — PROGRESS NOTES
"                                                                Physical Therapy Treatment Note, 30 Day Progress Note, and 90 Day Recertification Note  [address] [place] [address]    Patient: Tawny Shin                                                 Visit Date: 2024  :     1955    Referring practitioner:    Moises Oliveira MD  Date of Initial Visit:          Type: THERAPY  Noted: 2023    Patient seen for 60 sessions    Visit Diagnoses:    ICD-10-CM ICD-9-CM   1. Functional neurological symptom disorder with weakness or paralysis  F44.4 300.11   2. Lower paraplegia  G82.20 344.1       SUBJECTIVE     Subjective: She feels a lot better since beginning PT. She can stand up and walk with the walker some. Her arms feel better. She feels better about being able to do things around the house. She still wants to work on getting the R leg touching the floor so she can use it when she walks more. Her left hand is weaker than it was.       PAIN: 4/10 L shoulder > \"improved\"         OBJECTIVE     Objective     Manual Therapy     30494  Comments   STM B hamstring complex and gastroc with Power boost L1 bulb      Timed Minutes 15     Gait Training          37213   Task/Terrain Asst AD Comments   See goals      Timed Minutes 15     Therapeutic Activities    25606 Comments   See goals    Timed Minutes 15       Therapy Education/Self Care [id number]   Education offered today Re-emphasized Prone Knee Extension with Ankle Weight exercise   [facility] Code [email or web address]       Ongoing HEP     Date: 10/12/2023  Prepared by: Maria R Hicks     Bed:  - Prone Knee Extension with Ankle Weight  - 2-3 x daily - 7 x weekly - 30-60 seconds hold  - Supine Bridge  - 2 x daily - 7 x weekly - 2 sets - 10 reps  - Supine March  - 1-2 x daily - 7 x weekly - 2 sets - 10 reps  - Supine Active Straight Leg Raise  - 1-2 x daily - 7 x weekly - 2 sets - 10 reps     Chair  - Seated Hamstring Stretch with Chair  - 1-2 x daily " "- 7 x weekly - 2 sets - 10 reps  - Belt Assisted Dorsiflexion   - 1-2 x daily - 7 x weekly - 2 sets - 10 reps  - Standing Bilateral Gastroc Stretch with Step  - 1-2 x daily - 7 x weekly - 2 sets - 10 reps  - Seated Marching with Opposite Shoulder Flexion  - 1-2 x daily - 7 x weekly - 2 sets - 10 reps  - Seated Long Arc Quad  - 1-2 x daily - 7 x weekly - 2 sets - 10 reps  - Seated Gluteal Sets  - 1-2 x daily - 7 x weekly - 2 sets - 10 reps  - seated clamshells  - 1-2 x daily - 7 x weekly - 2 sets - 10 reps   Timed Minutes        Total Timed Treatment:        mins  Total Time of Visit:                mins         ASSESSMENT/PLAN     GOALS  Goals                                            Progress Note due by 3/21/2024                                                             Recert due by 5/20/2024   LTG by: 12 weeks Comments Date Status   Patient will report compliance with HEP.  3x/week  2/20 ongoing   Pt will be able to ambulate >/=75' w/ LRAD in one continuous bout in </=5 mins, 0 rest breaks to improve capacity for safe household ambulation  2/20 NEW   Pt will stand unsupported for >/=5-10 mins to be able to perform tasks at kitchen counter.  2/20 NEW   Pt will be able to perform SLS while also lifting contralateral side 6\" to assist with bathtub/shower t/f  2/20 NEW   Pt will be able to maintain static standing w/ 50/50 Wbing for >/=3 mins w/o UE support/a to improve capacity for ADL/IADLs 2:16.74  :42.47 sec  :59.16    2/20: stood for 1:23, brief ~5 sec bouts of 50/50 WB (using BR scales to determine) 2/20 ongoing   Patient to perform TUG in </=2 mins w/ FWW without LOB for improved capacity for household ambulation. [place] 1:1:36.55  [place] 2: 1:45.73  1/23:  Trial 1: 1:50   Trial 2: unable to complete     2/20: 1:05 2/20 MET   Pt will be able to perform 5x STS w/ 50/50 Wbing in </=2 mins for improved functional strength [place] 1:1:36.55  [place] 2: 1:45.73  1/23/24:  Trial 1: 1:50   Trial 2: unable to complete "   2/8/24: asymmetrical WB  Trial 1: 19.43 sec  Trial 2: 15.02 sec     2/20: 16.47 secs but asymmetrical WB 2/20 Partially met;  improving   Improve R ankle DF to neutral 9/28/20023  3 deg from neutral PROM, no change   1 deg past neutral PROM  10/31 MET   Improve gross B LE strength to >/=4/5 See table  12/28 MET       Assessment & Plan       Assessment  Impairments: abnormal coordination, abnormal gait, abnormal muscle firing, abnormal muscle tone, abnormal or restricted ROM, activity intolerance, impaired balance, impaired physical strength, lacks appropriate home exercise program, pain with function, safety issue and weight-bearing intolerance   Functional limitations: carrying objects, lifting, sleeping, walking, pulling, pushing, uncomfortable because of pain, moving in bed, sitting, standing, stooping, reaching behind back, reaching overhead and unable to perform repetitive tasks   Prognosis: good    Plan  Therapy options: will be seen for skilled therapy services  Planned modality interventions: thermotherapy (hydrocollator packs), cryotherapy, low level laser therapy, TENS, dry needling, electrical stimulation/Montserratian stimulation and ultrasound  Planned therapy interventions: abdominal trunk stabilization, manual therapy, ADL retraining, motor coordination training, balance/weight-bearing training, neuromuscular re-education, body mechanics training, postural training, soft tissue mobilization, spinal/joint mobilization, fine motor coordination training, flexibility, functional ROM exercises, strengthening, gait training, stretching, home exercise program, therapeutic activities, IADL retraining, joint mobilization and transfer training  Frequency: 2x week  Duration in weeks: 12  Treatment plan discussed with: PTA       ASSESSMENT: Progress note and re-cert performed per POC. She has achieved or partially achieved 4/6 initial goals w/ 3 new goals added today to further address standing tolerance, household  ambulation, and dynamic balance needed for more difficult transfers. She is now independent w/ stand pivot transfers and appr. 5' bouts of indoor ambulation though still has difficulty w/ prolonged standing and ambulation d/t muscle fatigue which will continue to be addressed in subsequent sessions. She continues to benefit from skilled OP PT services to optimize safety and independence w/ functional mobility.     PLAN: Continue strategies to improve RLE WB during therapeutic activities and ambulation. Emphasize standing tolerance and overall functional mm strength/endurance. Incorporate dynamic standing balance tasks to improve capacity for safe ADL/IADL performance.     SIGNATURE: Molly Garrett PT DPT, [place] License #: [id number]  Electronically Signed on 2/20/2024      Clinical Progress: improved  Home Program Compliance: Yes  Progress toward previous goals: Partially Met      90 Day Recertification  Certification Period: 2/20/2024 through 5/19/2024  I certify that the therapy services are furnished while this patient is under my care.  The services outlined above are required by this patient, and will be reviewed every 90 days.     PHYSICIAN: Moises Oliveira MD (NPI: [id number])    Signature:___________________________________________DATE: _________    Please sign and return via fax to [phone].   Thank you so much for letting us work with Tawny. I appreciate your letting us work with your patients. If you have any questions or concerns, please don't hesitate to contact me.               [address] [address]  [phone]

## 2024-02-22 ENCOUNTER — TREATMENT (OUTPATIENT)
Dept: PHYSICAL THERAPY | Facility: CLINIC | Age: 69
End: 2024-02-22
Payer: MEDICARE

## 2024-02-22 DIAGNOSIS — G82.20 LOWER PARAPLEGIA: ICD-10-CM

## 2024-02-22 DIAGNOSIS — F44.4 FUNCTIONAL NEUROLOGICAL SYMPTOM DISORDER WITH WEAKNESS OR PARALYSIS: Primary | ICD-10-CM

## 2024-02-22 NOTE — PROGRESS NOTES
"                                                                Physical Therapy Treatment Note  [address] [place] [address]    Patient: Tawny Shin                                                 Visit Date: 2024  :     1955    Referring practitioner:    Moises Oliveira MD  Date of Initial Visit:          Type: THERAPY  Noted: 2023    Patient seen for 61 sessions    Visit Diagnoses:    ICD-10-CM ICD-9-CM   1. Functional neurological symptom disorder with weakness or paralysis  F44.4 300.11   2. Lower paraplegia  G82.20 344.1       SUBJECTIVE     Subjective: She reports having no pain, but does feel weak when she stands. She reports she woke up at midnight and though it was the AM b/c she felt \"so refreshed\" but wasn't able to return to sleep until 4 AM.     PAIN: 0/10     OBJECTIVE     Objective     Therapeutic Exercises    05036 Units Comments   B LAQ with 2 lb ankle wt  x10    Sit <> stand with maggie-walker B UE 2 x 5 Lowered tx table   B ankle stretches passively with MHP to B knees               Timed Minutes 40     Neuromuscular Reeducation     27104 Comments   Standing on airex pad maintaining midline with B maggie walker 75 sec standing, 1-2 sec without B UE support   Standing marches with B UE phasic  x4 ea -- fatigued and needed to sit   Vibraflex, 10.0 Hz            Timed Minutes 8     Therapeutic Activities    34740 Comments   W/C <> mat table with FWW Mod independent   Sit <> stand with B maggie walker Jose R x1   Step up onto foam pad Unable with R LE, had to position feet on pad before standing        Timed Minutes 8     Therapy Education/Self Care 53531   Education offered today Re-emphasized Prone Knee Extension with Ankle Weight exercise   [employer] Code [email or web address]       Ongoing HEP     Date: 10/12/2023  Prepared by: Maria R Hicks     Bed:  - Prone Knee Extension with Ankle Weight  - 2-3 x daily - 7 x weekly - 30-60 seconds hold  - Supine Bridge  - 2 x daily - 7 " "x weekly - 2 sets - 10 reps  - Supine March  - 1-2 x daily - 7 x weekly - 2 sets - 10 reps  - Supine Active Straight Leg Raise  - 1-2 x daily - 7 x weekly - 2 sets - 10 reps     Chair  - Seated Hamstring Stretch with Chair  - 1-2 x daily - 7 x weekly - 2 sets - 10 reps  - Belt Assisted Dorsiflexion   - 1-2 x daily - 7 x weekly - 2 sets - 10 reps  - Standing Bilateral Gastroc Stretch with Step  - 1-2 x daily - 7 x weekly - 2 sets - 10 reps  - Seated Marching with Opposite Shoulder Flexion  - 1-2 x daily - 7 x weekly - 2 sets - 10 reps  - Seated Long Arc Quad  - 1-2 x daily - 7 x weekly - 2 sets - 10 reps  - Seated Gluteal Sets  - 1-2 x daily - 7 x weekly - 2 sets - 10 reps  - seated clamshells  - 1-2 x daily - 7 x weekly - 2 sets - 10 reps   Timed Minutes      Total Timed Treatment:     56   mins  Total Time of Visit:             60   mins         ASSESSMENT/PLAN     GOALS  Goals                                            Progress Note due by 3/21/2024                                                             Recert due by 5/20/2024   LTG by: 12 weeks Comments Date Status   Patient will report compliance with HEP.  3x/week  2/20 ongoing   Pt will be able to ambulate >/=75' w/ LRAD in one continuous bout in </=5 mins, 0 rest breaks to improve capacity for safe household ambulation  2/20 ongoing   Pt will stand unsupported for >/=5-10 mins to be able to perform tasks at kitchen counter.  2/20 ongoing   Pt will be able to perform SLS while also lifting contralateral side 6\" to assist with bathtub/shower t/f Unable to step up onto foam pad today  2/22 ongoing   Pt will be able to maintain static standing w/ 50/50 Wbing for >/=3 mins w/o UE support/a to improve capacity for ADL/IADLs 2:16.74  :42.47 sec  :59.16    2/20: stood for 1:23, brief ~5 sec bouts of 50/50 WB (using BR scales to determine) 2/20 ongoing   Patient to perform TUG in </=2 mins w/ FWW without LOB for improved capacity for household ambulation. [place] " 1:1:36.55  [place] 2: 1:45.73  1/23:  Trial 1: 1:50   Trial 2: unable to complete     2/20: 1:05 2/20 MET   Pt will be able to perform 5x STS w/ 50/50 Wbing in </=2 mins for improved functional strength [place] 1:1:36.55  [place] 2: 1:45.73  1/23/24:  Trial 1: 1:50   Trial 2: unable to complete   2/8/24: asymmetrical WB  Trial 1: 19.43 sec  Trial 2: 15.02 sec     2/20: 16.47 secs but asymmetrical WB 2/20 Partially met;  improving   Improve R ankle DF to neutral 9/28/20023  3 deg from neutral PROM, no change   1 deg past neutral PROM  10/31 MET   Improve gross B LE strength to >/=4/5 See table  12/28 MET       Assessment/Plan     ASSESSMENT: Patient reported feeling very weak this session in B LE and demonstrated several occurrences of knee buckling, therefore interventions kept light and remained close to tx table as PT tech was not in office this session to assist with safety. She did c/o increased knee pain, though improved with MHP to B knees.     PLAN: Consider use of agility ladder and hurdles, pending pt feeling competent to safely participate. Continue use of vibraflex as appropriate.     SIGNATURE: Maria R Garay PTA, [place] License #: [id number]  Electronically Signed on 2/22/2024        [address] [place] [address]. [address]  [phone]

## 2024-02-27 ENCOUNTER — TREATMENT (OUTPATIENT)
Dept: PHYSICAL THERAPY | Facility: CLINIC | Age: 69
End: 2024-02-27
Payer: MEDICARE

## 2024-02-27 DIAGNOSIS — G82.20 LOWER PARAPLEGIA: ICD-10-CM

## 2024-02-27 DIAGNOSIS — F44.4 FUNCTIONAL NEUROLOGICAL SYMPTOM DISORDER WITH WEAKNESS OR PARALYSIS: Primary | ICD-10-CM

## 2024-02-27 NOTE — PROGRESS NOTES
Physical Therapy Treatment Note  [address] [place] [address]    Patient: Tawny Shin                                                 Visit Date: 2024  :     1955    Referring practitioner:    Moises Oliveira MD  Date of Initial Visit:          Type: THERAPY  Noted: 2023    Patient seen for 62 sessions    Visit Diagnoses:    ICD-10-CM ICD-9-CM   1. Functional neurological symptom disorder with weakness or paralysis  F44.4 300.11   2. Lower paraplegia  G82.20 344.1         SUBJECTIVE     Subjective:  the left knee started hurting any time she put weight on it. The pain is still there. She is worried the bone on bone is still there. She felt okay after last time.     PAIN: 0/10, 4-5/10 when standing on L knee     OBJECTIVE     Objective     Therapeutic Exercises    94103 Units Comments   Mod SL STS RLE, 2*3    SL hip ABD 10 B Red TB above/below knees   Prone bent knee hip ext 2*10 B    Supine TKE     Timed Minutes 39     Neuromuscular Reeducation     69130 Comments   Vibraflex, 10.0 Hz Standing mini squats, HR   Timed Minutes 8     Therapeutic Activities    98267 Comments   STS, stand pivot tx w/ FWW ind    Ind w/ bed mobility    Timed Minutes 8       Therapy Education/Self Care 34441   Education offered today Avoid compression of wound   [employer] Code [email or web address]       Ongoing HEP     Date: 10/12/2023  Prepared by: Maria R Hicks     Bed:  - Prone Knee Extension with Ankle Weight  - 2-3 x daily - 7 x weekly - 30-60 seconds hold  - Supine Bridge  - 2 x daily - 7 x weekly - 2 sets - 10 reps  - Supine March  - 1-2 x daily - 7 x weekly - 2 sets - 10 reps  - Supine Active Straight Leg Raise  - 1-2 x daily - 7 x weekly - 2 sets - 10 reps     Chair  - Seated Hamstring Stretch with Chair  - 1-2 x daily - 7 x weekly - 2 sets - 10 reps  - Belt Assisted Dorsiflexion   - 1-2 x daily - 7 x weekly - 2 sets - 10 reps  -  "Standing Bilateral Gastroc Stretch with Step  - 1-2 x daily - 7 x weekly - 2 sets - 10 reps  - Seated Marching with Opposite Shoulder Flexion  - 1-2 x daily - 7 x weekly - 2 sets - 10 reps  - Seated Long Arc Quad  - 1-2 x daily - 7 x weekly - 2 sets - 10 reps  - Seated Gluteal Sets  - 1-2 x daily - 7 x weekly - 2 sets - 10 reps  - seated clamshells  - 1-2 x daily - 7 x weekly - 2 sets - 10 reps   Timed Minutes 5     Total Timed Treatment:     60   mins  Total Time of Visit:           60     mins         ASSESSMENT/PLAN     GOALS  Goals                                            Progress Note due by 3/21/2024                                                             Recert due by 5/20/2024   LTG by: 12 weeks Comments Date Status   Patient will report compliance with HEP.  3x/week  2/20 ongoing   Pt will be able to ambulate >/=75' w/ LRAD in one continuous bout in </=5 mins, 0 rest breaks to improve capacity for safe household ambulation  2/20 ongoing   Pt will stand unsupported for >/=5-10 mins to be able to perform tasks at kitchen counter.  2/20 ongoing   Pt will be able to perform SLS while also lifting contralateral side 6\" to assist with bathtub/shower t/f Unable to step up onto foam pad today  2/22 ongoing   Pt will be able to maintain static standing w/ 50/50 Wbing for >/=3 mins w/o UE support/a to improve capacity for ADL/IADLs 2:16.74  :42.47 sec  :59.16    2/20: stood for 1:23, brief ~5 sec bouts of 50/50 WB (using BR scales to determine) 2/20 ongoing   Patient to perform TUG in </=2 mins w/ FWW without LOB for improved capacity for household ambulation. [place] 1:1:36.55  [place] 2: 1:45.73  1/23:  Trial 1: 1:50   Trial 2: unable to complete     2/20: 1:05 2/20 MET   Pt will be able to perform 5x STS w/ 50/50 Wbing in </=2 mins for improved functional strength [place] 1:1:36.55  [place] 2: 1:45.73  1/23/24:  Trial 1: 1:50   Trial 2: unable to complete   2/8/24: asymmetrical WB  Trial 1: 19.43 sec  Trial 2: 15.02 " sec     2/20: 16.47 secs but asymmetrical WB 2/20 Partially met;  improving   Improve R ankle DF to neutral 9/28/20023  3 deg from neutral PROM, no change   1 deg past neutral PROM  10/31 MET   Improve gross B LE strength to >/=4/5 See table  12/28 MET       Assessment/Plan     ASSESSMENT: She presents w/ difficulty Wbing on LLE d/t knee pain though this improved somewhat after performance of exs targeting stabilizing knee mm. She inquired about proper care of wound on lateral malleolus on R foot and was advised to D/C use of compression wrap d/t suspected arterial wound.     PLAN: Consider use of agility ladder and hurdles, pending pt feeling competent to safely participate. Continue use of vibraflex as appropriate.     SIGNATURE: Molly Garrett PT DPT, [place] License #: [id number]  Electronically Signed on 2/27/2024        Marj Guzman  [place] [address]  [phone]

## 2024-02-29 ENCOUNTER — TREATMENT (OUTPATIENT)
Dept: PHYSICAL THERAPY | Facility: CLINIC | Age: 69
End: 2024-02-29
Payer: MEDICARE

## 2024-02-29 DIAGNOSIS — G82.20 LOWER PARAPLEGIA: ICD-10-CM

## 2024-02-29 DIAGNOSIS — F44.4 FUNCTIONAL NEUROLOGICAL SYMPTOM DISORDER WITH WEAKNESS OR PARALYSIS: Primary | ICD-10-CM

## 2024-02-29 NOTE — PROGRESS NOTES
Physical Therapy Treatment Note  [address] [place] [address]    Patient: Tawny Shin                                                 Visit Date: 2024  :     1955    Referring practitioner:    Moises Oliveira MD  Date of Initial Visit:          Type: THERAPY  Noted: 2023    Patient seen for 63 sessions    Visit Diagnoses:    ICD-10-CM ICD-9-CM   1. Functional neurological symptom disorder with weakness or paralysis  F44.4 300.11   2. Lower paraplegia  G82.20 344.1     SUBJECTIVE     Subjective: She reports that her hands are sore. She reports that this therapist did help with the knee a little bit. She reports that she was lying on her L side last night to protect the positioning of her L foot and her R leg started hurting a lot. She was eventually able to straighten it back out.     PAIN: 2-3/10 (B hands), 3/10 (knee)      OBJECTIVE     Objective     Therapeutic Exercises    56439 Units Comments   Passive knee ext/hamstring stretch  L >  R, MHP to B knees prior to stretch   B LAQ with 2 lb cuff weight 2 x 10 ea    B ankle DF stretch passively   Reported not feeling stretch intensely         Timed Minutes 25     Neuromuscular Reeducation     15110 Comments   Vibraflex, 12.0 Hz Standing mini squats, HR; 3 x 1 min ea   Timed Minutes 15     Therapy Education/Self Care 04111   Education offered today    Ongoing HEP     Access code: [email or web address]     Date: 10/12/2023  Prepared by: Maria R Hicks     Bed:  - Prone Knee Extension with Ankle Weight  - 2-3 x daily - 7 x weekly - 30-60 seconds hold  - Supine Bridge  - 2 x daily - 7 x weekly - 2 sets - 10 reps  - Supine March  - 1-2 x daily - 7 x weekly - 2 sets - 10 reps  - Supine Active Straight Leg Raise  - 1-2 x daily - 7 x weekly - 2 sets - 10 reps     Chair  - Seated Hamstring Stretch with Chair  - 1-2 x daily - 7 x weekly - 2 sets - 10 reps  - Belt Assisted  "Dorsiflexion   - 1-2 x daily - 7 x weekly - 2 sets - 10 reps  - Standing Bilateral Gastroc Stretch with Step  - 1-2 x daily - 7 x weekly - 2 sets - 10 reps  - Seated Marching with Opposite Shoulder Flexion  - 1-2 x daily - 7 x weekly - 2 sets - 10 reps  - Seated Long Arc Quad  - 1-2 x daily - 7 x weekly - 2 sets - 10 reps  - Seated Gluteal Sets  - 1-2 x daily - 7 x weekly - 2 sets - 10 reps  - seated clamshells  - 1-2 x daily - 7 x weekly - 2 sets - 10 reps   Timed Minutes      Total Timed Treatment:     40   mins  Total Time of Visit:             40    mins         ASSESSMENT/PLAN     GOALS  Goals                                            Progress Note due by 3/21/2024                                                             Recert due by 5/20/2024   LTG by: 12 weeks Comments Date Status   Patient will report compliance with HEP.  3x/week  2/20 ongoing   Pt will be able to ambulate >/=75' w/ LRAD in one continuous bout in </=5 mins, 0 rest breaks to improve capacity for safe household ambulation  2/20 ongoing   Pt will stand unsupported for >/=5-10 mins to be able to perform tasks at kitchen counter.  2/20 ongoing   Pt will be able to perform SLS while also lifting contralateral side 6\" to assist with bathtub/shower t/f Unable to step up onto foam pad today  2/22 ongoing   Pt will be able to maintain static standing w/ 50/50 Wbing for >/=3 mins w/o UE support/a to improve capacity for ADL/IADLs 2:16.74  :42.47 sec  :59.16    2/20: stood for 1:23, brief ~5 sec bouts of 50/50 WB (using BR scales to determine) 2/20 ongoing   Patient to perform TUG in </=2 mins w/ FWW without LOB for improved capacity for household ambulation. [place] 1:1:36.55  [place] 2: 1:45.73  1/23:  Trial 1: 1:50   Trial 2: unable to complete     2/20: 1:05 2/20 MET   Pt will be able to perform 5x STS w/ 50/50 Wbing in </=2 mins for improved functional strength [place] 1:1:36.55  [place] 2: 1:45.73  1/23/24:  Trial 1: 1:50   Trial 2: unable to " complete   2/8/24: asymmetrical WB  Trial 1: 19.43 sec  Trial 2: 15.02 sec     2/20: 16.47 secs but asymmetrical WB 2/20 Partially met;  improving   Improve R ankle DF to neutral 9/28/20023  3 deg from neutral PROM, no change   1 deg past neutral PROM  10/31 MET   Improve gross B LE strength to >/=4/5 See table  12/28 MET     Assessment/Plan     ASSESSMENT: Patient reported good response with knee ROM following last session this therapist worked with her, therefore prioritized this today. Overall, her ankle DF has improved as evidenced by her ability to place more of her heel on the ground in standing. She was also able to tolerate a total of 3 min on the VibraFlex at an increased intensity.     PLAN: Consider use of agility ladder and hurdles, pending pt feeling competent to safely participate. Continue use of vibraflex as appropriate. Consider stretching B knees and hamstrings every other session and alternate with balance and endurance tasks. Continue to encourage symmetrical WB and ankle DF to allow flat foot while standing.     SIGNATURE: Maria R Garay PTA, [place] License #: [id number]  Electronically Signed on 2/29/2024        [address] [place] [address] [place]. [address]  [phone]

## 2024-03-05 ENCOUNTER — TREATMENT (OUTPATIENT)
Dept: PHYSICAL THERAPY | Facility: CLINIC | Age: 69
End: 2024-03-05
Payer: MEDICARE

## 2024-03-05 DIAGNOSIS — F44.4 FUNCTIONAL NEUROLOGICAL SYMPTOM DISORDER WITH WEAKNESS OR PARALYSIS: Primary | ICD-10-CM

## 2024-03-05 DIAGNOSIS — G82.20 LOWER PARAPLEGIA: ICD-10-CM

## 2024-03-05 PROCEDURE — 97110 THERAPEUTIC EXERCISES: CPT | Performed by: PHYSICAL THERAPIST

## 2024-03-05 PROCEDURE — 97116 GAIT TRAINING THERAPY: CPT | Performed by: PHYSICAL THERAPIST

## 2024-03-05 NOTE — PROGRESS NOTES
"                                                                Physical Therapy Treatment Note  [address] [place] [address]    Patient: Tawny Shin                                                 Visit Date: 3/5/2024  :     1955    Referring practitioner:    Moises Oliveira MD  Date of Initial Visit:          Type: THERAPY  Noted: 2023    Patient seen for 64 sessions    Visit Diagnoses:    ICD-10-CM ICD-9-CM   1. Functional neurological symptom disorder with weakness or paralysis  F44.4 300.11   2. Lower paraplegia  G82.20 344.1     SUBJECTIVE     Subjective:She reports her L knee has been bothering her when she stands      PAIN: 3/10         OBJECTIVE     Objective     Gait Training          66871   Task/Terrain Asst AD Comments   Walking for distance  CGA/followed with WC seated rests between each Rwx 31'3\" , 15' 9\"15'5\"                 Timed Minutes 28        Therapeutic Exercises    57363 Units Comments   Seated low rows with TRX Rip Trainer x 20     Seated mid rows with TRX Rip Trainer x 20     Seated pressups x 10                Timed Minutes 12      Therapy Education/Self Care [id number]   Education offered today    Med[employer] Code    Ongoing HEP     Access code: [email or web address]     Date: 10/12/2023  Prepared by: Maria R Hicks     Bed:  - Prone Knee Extension with Ankle Weight  - 2-3 x daily - 7 x weekly - 30-60 seconds hold  - Supine Bridge  - 2 x daily - 7 x weekly - 2 sets - 10 reps  - Supine March  - 1-2 x daily - 7 x weekly - 2 sets - 10 reps  - Supine Active Straight Leg Raise  - 1-2 x daily - 7 x weekly - 2 sets - 10 reps     Chair  - Seated Hamstring Stretch with Chair  - 1-2 x daily - 7 x weekly - 2 sets - 10 reps  - Belt Assisted Dorsiflexion   - 1-2 x daily - 7 x weekly - 2 sets - 10 reps  - Standing Bilateral Gastroc Stretch with Step  - 1-2 x daily - 7 x weekly - 2 sets - 10 reps  - Seated Marching with Opposite Shoulder Flexion  - 1-2 x daily - 7 x weekly - 2 sets - " "10 reps  - Seated Long Arc Quad  - 1-2 x daily - 7 x weekly - 2 sets - 10 reps  - Seated Gluteal Sets  - 1-2 x daily - 7 x weekly - 2 sets - 10 reps  - seated clamshells  - 1-2 x daily - 7 x weekly - 2 sets - 10 reps   Timed Minutes        Total Timed Treatment:     40   mins  Total Time of Visit:             40   mins         ASSESSMENT/PLAN     GOALS  Goals                                            Progress Note due by 3/21/2024                                                             Recert due by 5/20/2024   LTG by: 12 weeks Comments Date Status   Patient will report compliance with HEP.  3x/week  2/20 ongoing   Pt will be able to ambulate >/=75' w/ LRAD in one continuous bout in </=5 mins, 0 rest breaks to improve capacity for safe household ambulation   2/20 ongoing   Pt will stand unsupported for >/=5-10 mins to be able to perform tasks at kitchen counter.   2/20 ongoing   Pt will be able to perform SLS while also lifting contralateral side 6\" to assist with bathtub/shower t/f Unable to step up onto foam pad today  2/22 ongoing   Pt will be able to maintain static standing w/ 50/50 Wbing for >/=3 mins w/o UE support/a to improve capacity for ADL/IADLs 2:16.74  :42.47 sec  :59.16     2/20: stood for 1:23, brief ~5 sec bouts of 50/50 WB (using BR scales to determine) 2/20 ongoing   Patient to perform TUG in </=2 mins w/ FWW without LOB for improved capacity for household ambulation. [place] 1:1:36.55  [place] 2: 1:45.73  1/23:  Trial 1: 1:50   Trial 2: unable to complete      2/20: 1:05 2/20 MET   Pt will be able to perform 5x STS w/ 50/50 Wbing in </=2 mins for improved functional strength [place] 1:1:36.55  [place] 2: 1:45.73  1/23/24:  Trial 1: 1:50   Trial 2: unable to complete   2/8/24: asymmetrical WB  Trial 1: 19.43 sec  Trial 2: 15.02 sec      2/20: 16.47 secs but asymmetrical WB 2/20 Partially met;  improving   Improve R ankle DF to neutral 9/28/20023  3 deg from neutral PROM, no change   1 deg past " neutral PROM  10/31 MET   Improve gross B LE strength to >/=4/5 See table  12/28 MET       Assessment/Plan     ASSESSMENT:   Each time I work with her and we are focusing on maximum distance she continues to improve    PLAN:   Continue working on overall strength and endurance    SIGNATURE: Kentrell Lamb [facility], [place] License #: [id number]  Electronically Signed on 3/5/2024        [address] [place] [address]. [id number]  [phone]

## 2024-03-06 NOTE — HOME HEALTH
Covid screen completed    Subjective: Patient is wanting to try getting in the shower today    Falls:none    Medication changes:none    Insurance changes:none    Edema:none    Medical necessity for continued visits: Skilled OT medically necessary to continue with shower training skills    Next MD appt:3/17/23    Plan for next visit: Plan to continue ADL's. family

## 2024-03-07 ENCOUNTER — TREATMENT (OUTPATIENT)
Dept: PHYSICAL THERAPY | Facility: CLINIC | Age: 69
End: 2024-03-07
Payer: MEDICARE

## 2024-03-07 DIAGNOSIS — F44.4 FUNCTIONAL NEUROLOGICAL SYMPTOM DISORDER WITH WEAKNESS OR PARALYSIS: Primary | ICD-10-CM

## 2024-03-07 DIAGNOSIS — G82.20 LOWER PARAPLEGIA: ICD-10-CM

## 2024-03-07 PROCEDURE — 97140 MANUAL THERAPY 1/> REGIONS: CPT | Performed by: PHYSICAL THERAPIST

## 2024-03-11 NOTE — PROGRESS NOTES
[facility] - PODIATRY    Today's Date: 03/18/2024     Patient Name: Tawny Shin  MRN: [id number]  CSN: [id number]  PCP: Moises Oliveira MD  Referring Provider: No ref. provider found    SUBJECTIVE     Chief Complaint   Patient presents with    Follow-up     Moises Oliveira MD 01/22/2024 3 MTH FU- pt states feet doing ok, couple of spots would like checked- pt denies pain      HPI: Tawny Shin, a 68 y.o.female, comes to clinic as a(n) established patient complaining of thickened, irregular toenails of both feet . Patient has h/o arthritis, asthma, bunions, CA, Heart block, Charcot joint of left foot, DVT, COPD, CAD, HLD, HTN, Hypothyroid, MI, pacemaker .  Patient presents for nail care of thickened, irregular toenails of both feet. Denies any open wounds or sores currently. States that she feels like she has some neuropathy in feet as she is unable to fully feel toes. Continues use of motorized wheelchair. Patient is concerned over areas on right foot/heel that are tender from pressure.  No open or draining wounds noted. Denies pain. Relates previous treatment(s) including care by podiatry . Patient continues Eliquis daily. Denies any constitutional symptoms. No other pedal complaints at this time.    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    Elevated cholesterol     Eustachian tube dysfunction     Heart disease     Herpes simplex     History of transfusion      "Hyperlipidemia     Hypertension     Hypothyroidism 12/23/2020    Intrinsic asthma 12/23/2020    Knee dislocation     Labral tear of right hip joint     Laryngitis sicca     Laryngitis, chronic     Left carotid bruit 03/09/2016    MI (myocardial infarction)     Myalgia due to statin 06/25/2019    Open wound of right hip 09/14/2021    Osteomyelitis of right femur 07/06/2021    Otorrhea     Pacemaker 11/17/2016    Primary osteoarthritis of left knee 12/23/2020    Psoriasis vulgaris 12/23/2020    S/P coronary artery stent placement 03/09/2016    Sensorineural hearing loss     Seropositive rheumatoid arthritis of multiple sites 12/27/2019    Overview:  -myochrysine '93-'96 -methotrexate '96--->11/98;r/s  restarted 2/99--> 8/14 (anemia) -sulfasalazine- not effective -penicillamine 6/98-->10/98; no effect -leflunomide 11/98--> - Humira '13-->didn't take - Enbrel 12/14-->3/15- no effect!   Last Assessment & Plan:  - \"aching all over\" because she had to be off her anti-rheumatic drugs for 2 weeks in preparation for her R knee surgery - he    Sick sinus syndrome 12/27/2019    Sjogren's disease     Spondylolisthesis of lumbar region 01/17/2018    Syncope, recurrent 02/08/2021    Urinary tract infection      Past Surgical History:   Procedure Laterality Date    A-V CARDIAC PACEMAKER INSERTION  2016    ATRIAL CARDIAC PACEMAKER INSERTION      CARDIAC CATHETERIZATION      CATARACT EXTRACTION      CERVICAL CORPECTOMY N/A 3/3/2021    Procedure: CERVICAL 6 CORPECTOMY WITH TITANIUM CAGE WITH NEURO MONITORING;  Surgeon: Bandar Shea MD;  [facility];  Service: Neurosurgery;  Laterality: N/A;    COLONOSCOPY  11/08/2011    One fold in the ascending colon which showed ulcer otherwise normal exam    COLONOSCOPY  11/12/2004    Normal exam repeat in five years    CORONARY ANGIOPLASTY WITH STENT PLACEMENT      X 2; 2013 & 2014    ENDOSCOPY  07/10/2014    Normal exam    FLAP LEG Right 9/14/2021    Procedure: RIGHT GLUTEAL " FASCIOCUTANEOUS ADVANCEMENT FLAP AND RIGHT TENSOR FASCIAL JESSICA FLAP;  Surgeon: Amadeo Turner MD;  Location:  [facility];  Service: Plastics;  Laterality: Right;    HIP ABDUCTION TENOTOMY BILATERAL Right 1/14/2021    Procedure: RIGHT HIP GLUTEUS MEDLUS / MINIMUS REPAIR, POSSIBLE ACHILLES ALLOGRAFT;  Surgeon: Nino Carlson MD;  Location:  [facility];  Service: Orthopedics;  Laterality: Right;    INCISION AND DRAINAGE ABSCESS Right 6/4/2022    Procedure: INCISION AND DRAINAGE ABSCESS right hip;  Surgeon: Magda Salcido MD;  Location:  [facility];  Service: General;  Laterality: Right;    INCISION AND DRAINAGE ABSCESS Right 6/10/2022    Procedure: RIGHT HIP INCISION AND DRAINAGE. MD NEEDS 3L VANC IRRIGATION, CURRETTES, DAICANS, KERLEX ROLLS;  Surgeon: Amadeo Turner MD;  Location:  [facility];  Service: Plastics;  Laterality: Right;    INCISION AND DRAINAGE HIP Right 2/9/2021    Procedure: HIP INCISION AND DRAINAGE;  Surgeon: Nino Carlson MD;  Location:  [facility];  Service: Orthopedics;  Laterality: Right;    INCISION AND DRAINAGE LEG Right 10/24/2021    Procedure: INCISION AND DRAINAGE LOWER EXTREMITY;  Surgeon: Amadeo Turner MD;  Location:  [facility];  Service: Plastics;  Laterality: Right;    INCISION AND DRAINAGE OF WOUND Right 7/8/2021    Procedure: INCISION AND DRAINAGE WOUND RIGHT HIP;  Surgeon: James Huntley MD;  Location:  [facility];  Service: Orthopedics;  Laterality: Right;    JOINT REPLACEMENT      KYPHOPLASTY WITH BIOPSY Bilateral 10/26/2021    Procedure: THOARCIC 12 KYPHOPLASTY WITH BIOPSY;  Surgeon: Bandar Shea MD;  Location:  [facility];  Service: Neurosurgery;  Laterality: Bilateral;    LEG DEBRIDEMENT Right 9/14/2021    Procedure: DEBRIDEMENT OF RIGHT HIP WOUND, RIGHT GLUTEAL FASCIOCUTANEOUS ADVANCEMENT FLAP AND RIGHT TENSOR FASCIAL JESSICA FLAP;  Surgeon: Amadeo Turner MD;  Location:  [facility];  Service: Plastics;  Laterality: Right;    LUMBAR DISCECTOMY Right 3/23/2021     Procedure: LUMBAR DISCECTOMY MICRO, Lumbar 1/2 right;  Surgeon: Bandar Shea MD;  Location:  [facility];  Service: Neurosurgery;  Laterality: Right;    LUMBAR FUSION N/A 1/19/2018    Procedure: L3-4,L4-5 DECOMPRESSION, POSTERIOR SPINAL FUSION WITH INSTRUMENTATION;  Surgeon: Fortino Oropeza MD;  Location:  [facility];  Service:     LUMBAR LAMINECTOMY WITH FUSION Left 1/17/2018    Procedure: LEFT L3-4 L4-5 LATERAL LUMBAR INTERBODY FUSION;  Surgeon: Fortino Oropeza MD;  Location:  [facility];  Service:     MYRINGOTOMY W/ TUBES  09/04/2014    TUBES NO LONGER IN PLACE    OTHER SURGICAL HISTORY      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 so hardware was removed and spacers were placed    REPLACEMENT TOTAL KNEE Right      Family History   Problem Relation Age of Onset    Cancer Mother         Lung cancer    Heart disease Father         Doied of heart. Attack     Social History     Socioeconomic History    Marital status:    Tobacco Use    Smoking status: Never     Passive exposure: Never    Smokeless tobacco: Never   Vaping Use    Vaping status: Never Used   Substance and Sexual Activity    Alcohol use: No    Drug use: Never    Sexual activity: Defer     Birth control/protection: Abstinence     Allergies   Allergen Reactions    Atorvastatin Other (See Comments)     LEG CRAMPS      Amoxicillin Rash    Escitalopram Rash    Nabumetone Rash    Niacin Er Rash    Penicillin G Rash    Penicillins Rash    Simvastatin Rash     Current Outpatient Medications   Medication Sig Dispense Refill    acetaminophen (TYLENOL) 325 MG tablet Take 2 tablets by mouth Every 6 (Six) Hours As Needed (mild pain). Indications: Fever, Pain      aspirin 81 MG EC tablet Take 1 tablet by mouth Daily. Indications: Disease involving Lipid Deposits in the Arteries      carvedilol (COREG) 25 MG tablet TAKE ONE TABLET BY MOUTH TWICE A DAY WITH MEALS 180 tablet 3    Diclofenac Sodium (VOLTAREN) 1 % gel gel Apply 4 g topically to the appropriate area  as directed 4 (Four) Times a Day As Needed. Indications: Joint Damage causing Pain and Loss of Function      DULoxetine (CYMBALTA) 60 MG capsule TAKE ONE CAPSULE BY MOUTH DAILY 90 capsule 3    Eliquis 5 MG tablet tablet TAKE ONE TABLET BY MOUTH TWICE A  tablet 3    Evolocumab (REPATHA) solution auto-injector SureClick injection Inject 1 mL under the skin into the appropriate area as directed Every 14 (Fourteen) Days. 2 mL 11    ferrous sulfate 324 (65 Fe) MG tablet delayed-release EC tablet TAKE ONE TABLET BY MOUTH DAILY WITH BREAKFAST 90 tablet 3    fluticasone (FLONASE) 50 MCG/ACT nasal spray 1 spray into the nostril(s) as directed by provider Daily. 18.2 mL 5    folic acid (FOLVITE) 1 MG tablet TAKE ONE TABLET BY MOUTH DAILY 90 tablet 3    furosemide (LASIX) 40 MG tablet TAKE ONE TABLET BY MOUTH DAILY 90 tablet 3    isosorbide mononitrate (IMDUR) 60 MG 24 hr tablet TAKE ONE TABLET BY MOUTH EVERY MORNING 90 tablet 3    leflunomide (ARAVA) 20 MG tablet Take 1 tablet by mouth every night at bedtime. Indications: Rheumatoid Arthritis 90 tablet 3    levothyroxine (SYNTHROID, LEVOTHROID) 75 MCG tablet TAKE ONE TABLET BY MOUTH DAILY 90 tablet 3    Lidocaine 4 % patch Apply  topically At Night As Needed for Moderate Pain. To lower back  Indications: Arthritis Related to an Inflammatory Disorder      losartan (COZAAR) 50 MG tablet TAKE 1 TABLET BY MOUTH DAILY 90 tablet 3    magnesium hydroxide (MILK OF MAGNESIA) 400 MG/5ML suspension Take 30 mL by mouth Daily As Needed for Constipation. Indications: Constipation      Menthol-Zinc Oxide 0.45-20 % ointment Apply 1 application  topically 2 (Two) Times a Day. Apply to right buttock      multivitamin with minerals tablet tablet Take 1 tablet by mouth Daily.      nitroglycerin (Nitrostat) 0.4 MG SL tablet Place 1 tablet under the tongue Every 5 (Five) Minutes As Needed for Chest Pain. Take no more than 3 doses in 15 minutes. (Patient taking differently: Place 1 tablet  under the tongue Every 5 (Five) Minutes As Needed for Chest Pain. Take no more than 3 doses in 15 minutes.)  12    ondansetron (Zofran) 4 MG tablet Take 1 tablet by mouth Every 8 (Eight) Hours As Needed for Nausea or Vomiting. 45 tablet 0    predniSONE (DELTASONE) 5 MG tablet TAKE ONE TABLET BY MOUTH DAILY 90 tablet 3    salsalate (DISALCID) 500 MG tablet TAKE 5 TABLETS BY MOUTH ON MONDAY, WEDNESDAY, AND FRIDAY AND TAKE 4 TABLETS BY MOUTH ON TUESDAY, THURSDAY, AND SATURDAY 384 tablet 3    sucralfate (CARAFATE) 1 g tablet TAKE ONE TABLET BY MOUTH FOUR TIMES A DAY BEFORE MEALS AND AT BEDTIME 360 tablet 3    traMADol (ULTRAM) 50 MG tablet Take 1 tablet by mouth Every 6 (Six) Hours As Needed for Moderate Pain.      valACYclovir (VALTREX) 500 MG tablet TAKE ONE TABLET BY MOUTH DAILY 90 tablet 3    Vibegron 75 MG tablet Take 1 tablet by mouth Daily. 30 tablet 11     No current facility-administered medications for this visit.     Review of Systems   Constitutional:  Negative for chills and fever.   HENT:  Negative for congestion.    Respiratory:  Negative for shortness of breath.    Cardiovascular:  Positive for leg swelling. Negative for chest pain.   Gastrointestinal:  Negative for constipation, diarrhea, nausea and vomiting.   Musculoskeletal:  Positive for arthralgias, back pain and gait problem.        Foot pain   Skin:  Negative for wound.   Neurological:  Positive for numbness.   Hematological:  Bruises/bleeds easily.       OBJECTIVE     Vitals:    24 1011   Pulse: 76   SpO2: 94%         PHYSICAL EXAM  GEN:   Accompanied by none.     Foot/Ankle Exam    GENERAL  Appearance:  appears stated age  Orientation:  AAOx3  Affect:  appropriate  Assistance:  wheelchair (motorized)  Right shoe gear: casual shoe  Left shoe gear: casual shoe    VASCULAR     Right Foot Vascularity   Dorsalis pedis:  2+  Posterior tibial:  2+  Skin temperature:  warm  Edema gradin+ and pitting  CFT:  3  Pedal hair growth:   Present  Varicosities:  spider veins     Left Foot Vascularity   Dorsalis pedis:  2+  Posterior tibial:  2+  Skin temperature:  warm  Edema gradin+ and pitting  CFT:  3  Pedal hair growth:  Present  Varicosities:  spider veins     NEUROLOGIC     Right Foot Neurologic   Light touch sensation: diminished  Vibratory sensation: diminished  Hot/Cold sensation: diminished  Protective Sensation using Franklin-Ian Monofilament:   Sites intact: 10  Sites tested: 10     Left Foot Neurologic   Light touch sensation: diminished  Vibratory sensation: diminished  Hot/Cold sensation:  diminished  Protective Sensation using Franklin-Ian Monofilament:   Sites intact: 10  Sites tested: 10    MUSCULOSKELETAL     Right Foot Musculoskeletal   Ecchymosis:  none  Tenderness:  toenail problem    Arch:  Normal  Hammertoe:  Second toe, third toe, fourth toe and fifth toe  Hallux valgus: Yes       Left Foot Musculoskeletal   Ecchymosis:  none  Tenderness:  toenail problem  Arch:  Normal  Hammertoe:  Second toe, third toe, fourth toe and fifth toe    MUSCLE STRENGTH     Right Foot Muscle Strength   Foot dorsiflexion:  4-  Foot plantar flexion:  4-  Foot inversion:  4-  Foot eversion:  4-     Left Foot Muscle Strength   Foot dorsiflexion:  4  Foot plantar flexion:  4  Foot inversion:  4  Foot eversion:  4    RANGE OF MOTION     Right Foot Range of Motion   Foot and ankle ROM within normal limits       Left Foot Range of Motion   Foot and ankle ROM within normal limits      DERMATOLOGIC      Right Foot Dermatologic   Skin  Positive for dryness. Negative for cellulitis, erythema and ulcer.   Nails  1.  Positive for elongated, abnormal thickness and subungual debris.  2.  Positive for elongated, abnormal thickness and subungual debris.  3.  Positive for elongated, abnormal thickness and subungual debris.  4.  Positive for elongated, abnormal thickness and subungual debris.  5.  Positive for elongated, abnormal thickness and subungual  debris.     Left Foot Dermatologic   Skin  Positive for dryness. Negative for ulcer.   Nails  1.  Positive for elongated, abnormal thickness and subungual debris.  2.  Positive for elongated, abnormal thickness and subungual debris.  3.  Positive for elongated, abnormal thickness and subungual debris.  4.  Positive for elongated, abnormally thick and subungual debris.  5.  Positive for elongated, abnormally thick and subungual debris.      RADIOLOGY/NUCLEAR:  No results found.    LABORATORY/CULTURE RESULTS:      PATHOLOGY RESULTS:       ASSESSMENT/PLAN     Diagnoses and all orders for this visit:    1. Thickened nail (Primary)    2. Wheelchair dependent    3. Functional gait abnormality    4. Anticoagulant long-term use    5. Hallux valgus, right    6. Functional neurological symptom disorder with weakness or paralysis          Comprehensive lower extremity examination and evaluation was performed.  Discussed findings and treatment plan including risks, benefits, and treatment options with patient in detail. Patient agreed with treatment plan.  After verbal consent obtained, nail(s) x10 debrided of length and thickness with nail nipper without incidence  Patient may maintain nails and calluses at home utilizing emery board or pumice stone between visits as needed   Encourage the use of pillows under the leg to decrease pressure to the ankle and heel.    Elevate legs at rest.    An After Visit Summary was printed and given to the patient at discharge, including (if requested) any available informative/educational handouts regarding diagnosis, treatment, or medications. All questions were answered to patient/family satisfaction. Should symptoms fail to improve or worsen they agree to call or return to clinic or to go to the Emergency Department. Discussed the importance of following up with any needed screening tests/labs/specialist appointments and any requested follow-up recommended by me today. Importance of  maintaining follow-up discussed and patient accepts that missed appointments can delay diagnosis and potentially lead to worsening of conditions.  Return in about 3 months (around 6/18/2024) for Follow-up with Podiatry Provider, Schedule [facility]., or sooner if acute issues arise.    Lab Frequency Next Occurrence   Follow Anesthesia Guidelines / Standing Orders Once 03/02/2021   Provide NPO Instructions to Patient Once 03/07/2021   Chlorhexidine Skin Prep Once 03/07/2021   Obtain Informed Consent Once 03/03/2021       This document has been electronically signed by DANIELA Longoria on March 18, 2024 10:46 CDT

## 2024-03-12 ENCOUNTER — TREATMENT (OUTPATIENT)
Dept: PHYSICAL THERAPY | Facility: CLINIC | Age: 69
End: 2024-03-12
Payer: MEDICARE

## 2024-03-12 DIAGNOSIS — G82.20 LOWER PARAPLEGIA: ICD-10-CM

## 2024-03-12 DIAGNOSIS — F44.4 FUNCTIONAL NEUROLOGICAL SYMPTOM DISORDER WITH WEAKNESS OR PARALYSIS: Primary | ICD-10-CM

## 2024-03-12 NOTE — PROGRESS NOTES
"                                                                Physical Therapy Treatment Note  [address] [place] [address]    Patient: Tawny Shin                                                 Visit Date: 3/12/2024  :     1955    Referring practitioner:    Moises Oliveira MD  Date of Initial Visit:          Type: THERAPY  Noted: 2023    Patient seen for 66 sessions    Visit Diagnoses:    ICD-10-CM ICD-9-CM   1. Functional neurological symptom disorder with weakness or paralysis  F44.4 300.11   2. Lower paraplegia  G82.20 344.1       SUBJECTIVE     Subjective: Nothing new to report.    PAIN: 0/10         OBJECTIVE     Objective     Gait Training          74931   Task/Terrain Asst AD Comments   Walking to rhythm CGA FWW 24', 22', 19', 17'   Timed Minutes 20        Therapy Education/Self Care [id number]   Education offered today    Rakesh Code    Ongoing HEP   Access code: [email or web address]     Date: 10/12/2023  Prepared by: Maria R Hicks     Bed:  - Prone Knee Extension with Ankle Weight  - 2-3 x daily - 7 x weekly - 30-60 seconds hold  - Supine Bridge  - 2 x daily - 7 x weekly - 2 sets - 10 reps  - Supine March  - 1-2 x daily - 7 x weekly - 2 sets - 10 reps  - Supine Active Straight Leg Raise  - 1-2 x daily - 7 x weekly - 2 sets - 10 reps     Chair  - Seated Hamstring Stretch with Chair  - 1-2 x daily - 7 x weekly - 2 sets - 10 reps  - Belt Assisted Dorsiflexion   - 1-2 x daily - 7 x weekly - 2 sets - 10 reps  - Standing Bilateral Gastroc Stretch with Step  - 1-2 x daily - 7 x weekly - 2 sets - 10 reps  - Seated Marching with Opposite Shoulder Flexion  - 1-2 x daily - 7 x weekly - 2 sets - 10 reps  - Seated Long Arc Quad  - 1-2 x daily - 7 x weekly - 2 sets - 10 reps  - Seated Gluteal Sets  - 1-2 x daily - 7 x weekly - 2 sets - 10 reps  - seated clamshells  - 1-2 x daily - 7 x weekly - 2 sets - 10 reps   Timed Minutes      Therapeutic Exercises    94739 Units Comments   2\" step taps " "10 B PT blocking R knee, // bars, 10 B   Standing for time stacking cones  1 min 40 secs   Theradisc step ups RLE leading, LLE descending 10    Timed Minutes 23       Total Timed Treatment:     43   mins  Total Time of Visit:             43   mins         ASSESSMENT/PLAN     GOALS  Goals                                            Progress Note due by 3/21/2024                                                             Recert due by 5/20/2024   LTG by: 12 weeks Comments Date Status   Patient will report compliance with HEP.  3x/week  2/20 ongoing   Pt will be able to ambulate >/=75' w/ LRAD in one continuous bout in </=5 mins, 0 rest breaks to improve capacity for safe household ambulation  max of 24' today 3/12 ongoing   Pt will stand unsupported for >/=5-10 mins to be able to perform tasks at kitchen counter.  1:40 today 3/12 ongoing   Pt will be able to perform SLS while also lifting contralateral side 6\" to assist with bathtub/shower t/f Unable to step up onto foam pad today  2/22 ongoing   Pt will be able to maintain static standing w/ 50/50 Wbing for >/=3 mins w/o UE support/a to improve capacity for ADL/IADLs 2:16.74  :42.47 sec  :59.16     2/20: stood for 1:23, brief ~5 sec bouts of 50/50 WB (using BR scales to determine) 2/20 ongoing   Patient to perform TUG in </=2 mins w/ FWW without LOB for improved capacity for household ambulation. [place] 1:1:36.55  [place] 2: 1:45.73  1/23:  Trial 1: 1:50   Trial 2: unable to complete      2/20: 1:05 2/20 MET   Pt will be able to perform 5x STS w/ 50/50 Wbing in </=2 mins for improved functional strength [place] 1:1:36.55  [place] 2: 1:45.73  1/23/24:  Trial 1: 1:50   Trial 2: unable to complete   2/8/24: asymmetrical WB  Trial 1: 19.43 sec  Trial 2: 15.02 sec      2/20: 16.47 secs but asymmetrical WB 2/20 Partially met;  improving   Improve R ankle DF to neutral 9/28/20023  3 deg from neutral PROM, no change   1 deg past neutral PROM  10/31 MET   Improve gross B LE strength " to >/=4/5 See table  12/28 MET       Assessment/Plan     ASSESSMENT: Her R hip and quad weakness and early onset mm fatigue contribute to difficulty advancing LLE and reduced ambulatory distance. Addressed w/ functional strengthening activities although doing so increased B knee pain and need for seated rest breaks.    PLAN: Consider working on static standing, utilize the Vibreflex, and strengthening her UE's    SIGNATURE: Molly Garrett PT DPT, [place] License #: [id number]  Electronically Signed on 3/12/2024        Marj Guzman  [place] [address]  [phone]

## 2024-03-14 ENCOUNTER — TREATMENT (OUTPATIENT)
Dept: PHYSICAL THERAPY | Facility: CLINIC | Age: 69
End: 2024-03-14
Payer: MEDICARE

## 2024-03-14 DIAGNOSIS — G82.20 LOWER PARAPLEGIA: ICD-10-CM

## 2024-03-14 DIAGNOSIS — F44.4 FUNCTIONAL NEUROLOGICAL SYMPTOM DISORDER WITH WEAKNESS OR PARALYSIS: Primary | ICD-10-CM

## 2024-03-14 NOTE — PROGRESS NOTES
Physical Therapy Treatment Note  [address] [place] [address]    Patient: Tawny Shin                                                 Visit Date: 3/14/2024  :     1955    Referring practitioner:    Moises Oliveira MD  Date of Initial Visit:          Type: THERAPY  Noted: 2023    Patient seen for 67 sessions    Visit Diagnoses:    ICD-10-CM ICD-9-CM   1. Functional neurological symptom disorder with weakness or paralysis  F44.4 300.11   2. Lower paraplegia  G82.20 344.1     SUBJECTIVE     Subjective: Reports her knees and back are hurting today.     PAIN: 3-4/10     OBJECTIVE     Objective     Therapeutic Exercises    77107 Units Comments   STS with B UE punches 1 lb L 2 lb R 2 x 10     Seated on airex with LE unsupported: B UE overhead to 90 deg abd with 10 sec small range oscillating hold 2 x 10  Modified from standing to seated d/t knee and back pain   Seated marches with B UE over head 2 lb R 1 lb L DB x15    LAQ 2 x 10  Avoided wt cuff today d/t knee pain        Timed Minutes 30     Neuromuscular Reeducation     [id number] Comments   Vibraflex with CGA x2 Initially 13.0 though increase to 15.0       Timed Minutes 8     Therapy Education/Self Care 86956   Education offered today    Ongoing HEP   Access code: [email or web address]     Date: 10/12/2023  Prepared by: Maria R Hicks     Bed:  - Prone Knee Extension with Ankle Weight  - 2-3 x daily - 7 x weekly - 30-60 seconds hold  - Supine Bridge  - 2 x daily - 7 x weekly - 2 sets - 10 reps  - Supine March  - 1-2 x daily - 7 x weekly - 2 sets - 10 reps  - Supine Active Straight Leg Raise  - 1-2 x daily - 7 x weekly - 2 sets - 10 reps     Chair  - Seated Hamstring Stretch with Chair  - 1-2 x daily - 7 x weekly - 2 sets - 10 reps  - Belt Assisted Dorsiflexion   - 1-2 x daily - 7 x weekly - 2 sets - 10 reps  - Standing Bilateral Gastroc Stretch with Step  - 1-2 x daily - 7 x weekly -  "2 sets - 10 reps  - Seated Marching with Opposite Shoulder Flexion  - 1-2 x daily - 7 x weekly - 2 sets - 10 reps  - Seated Long Arc Quad  - 1-2 x daily - 7 x weekly - 2 sets - 10 reps  - Seated Gluteal Sets  - 1-2 x daily - 7 x weekly - 2 sets - 10 reps  - seated clamshells  - 1-2 x daily - 7 x weekly - 2 sets - 10 reps   Timed Minutes      Total Timed Treatment:     38   mins  Total Time of Visit:             45   mins         ASSESSMENT/PLAN     GOALS  Goals                                            Progress Note due by 3/21/2024                                                             Recert due by 5/20/2024   LTG by: 12 weeks Comments Date Status   Patient will report compliance with HEP.  3x/week  2/20 ongoing   Pt will be able to ambulate >/=75' w/ LRAD in one continuous bout in </=5 mins, 0 rest breaks to improve capacity for safe household ambulation  max of 24' today 3/12 ongoing   Pt will stand unsupported for >/=5-10 mins to be able to perform tasks at kitchen counter.  1:40 today 3/12 ongoing   Pt will be able to perform SLS while also lifting contralateral side 6\" to assist with bathtub/shower t/f Unable to step up onto foam pad today  2/22 ongoing   Pt will be able to maintain static standing w/ 50/50 Wbing for >/=3 mins w/o UE support/a to improve capacity for ADL/IADLs 2:16.74  :42.47 sec  :59.16     2/20: stood for 1:23, brief ~5 sec bouts of 50/50 WB (using BR scales to determine)  Limited by knee pain with standing activities today  3/14 ongoing   Patient to perform TUG in </=2 mins w/ FWW without LOB for improved capacity for household ambulation. [place] 1:1:36.55  [place] 2: 1:45.73  1/23:  Trial 1: 1:50   Trial 2: unable to complete     2/20: 1:05 2/20 MET   Pt will be able to perform 5x STS w/ 50/50 Wbing in </=2 mins for improved functional strength Trail 1:1:36.55  [place] 2: 1:45.73  1/23/24:  Trial 1: 1:50   Trial 2: unable to complete   2/8/24: asymmetrical WB  Trial 1: 19.43 sec  Trial " 2: 15.02 sec      2/20: 16.47 secs but asymmetrical WB 2/20 Partially met;  improving   Improve R ankle DF to neutral 9/28/20023  3 deg from neutral PROM, no change   1 deg past neutral PROM  10/31 MET   Improve gross B LE strength to >/=4/5 See table  12/28 MET       Assessment/Plan     ASSESSMENT: incorporated UE strengthening with core and LE as well as dynamic movements, as tolerated, today. She did c/o increased LBP and knee pain today which was limiting to activity tolerance and therefore forewent prolonged periods of standing today. She was able to tolerate increased Hz on VibraFlex, though was audibly SOA and required seated rest break x2 to recover.     PLAN: Address all goals for progress note. If tolerated, continue working on static standing, utilize the Vibreflex, and strengthening her UE's    SIGNATURE: Maria R Garay PTA, [place] License #: [id number]  Electronically Signed on 3/14/2024        [facility] Guzman  [place] [address]  [phone]

## 2024-03-15 ENCOUNTER — TELEPHONE (OUTPATIENT)
Dept: PODIATRY | Facility: CLINIC | Age: 69
End: 2024-03-15
Payer: MEDICARE

## 2024-03-18 ENCOUNTER — OFFICE VISIT (OUTPATIENT)
Dept: PODIATRY | Facility: CLINIC | Age: 69
End: 2024-03-18
Payer: MEDICARE

## 2024-03-18 VITALS — HEIGHT: 66 IN | WEIGHT: 190 LBS | BODY MASS INDEX: 30.53 KG/M2 | HEART RATE: 76 BPM | OXYGEN SATURATION: 94 %

## 2024-03-18 DIAGNOSIS — F44.4 FUNCTIONAL NEUROLOGICAL SYMPTOM DISORDER WITH WEAKNESS OR PARALYSIS: ICD-10-CM

## 2024-03-18 DIAGNOSIS — Z99.3 WHEELCHAIR DEPENDENT: ICD-10-CM

## 2024-03-18 DIAGNOSIS — R26.89 FUNCTIONAL GAIT ABNORMALITY: ICD-10-CM

## 2024-03-18 DIAGNOSIS — Z79.01 ANTICOAGULANT LONG-TERM USE: ICD-10-CM

## 2024-03-18 DIAGNOSIS — M20.11 HALLUX VALGUS, RIGHT: ICD-10-CM

## 2024-03-18 DIAGNOSIS — L60.2 THICKENED NAIL: Primary | ICD-10-CM

## 2024-03-18 PROCEDURE — 1159F MED LIST DOCD IN RCRD: CPT | Performed by: NURSE PRACTITIONER

## 2024-03-18 PROCEDURE — 11721 DEBRIDE NAIL 6 OR MORE: CPT | Performed by: NURSE PRACTITIONER

## 2024-03-18 PROCEDURE — 1160F RVW MEDS BY RX/DR IN RCRD: CPT | Performed by: NURSE PRACTITIONER

## 2024-03-19 ENCOUNTER — TREATMENT (OUTPATIENT)
Dept: PHYSICAL THERAPY | Facility: CLINIC | Age: 69
End: 2024-03-19
Payer: MEDICARE

## 2024-03-19 DIAGNOSIS — G82.20 LOWER PARAPLEGIA: ICD-10-CM

## 2024-03-19 DIAGNOSIS — F44.4 FUNCTIONAL NEUROLOGICAL SYMPTOM DISORDER WITH WEAKNESS OR PARALYSIS: Primary | ICD-10-CM

## 2024-03-19 NOTE — PROGRESS NOTES
Physical Therapy Treatment Note  [address] [place] [address]    Patient: Tawny Shin                                                 Visit Date: 3/19/2024  :     1955    Referring practitioner:    Moises Oliveira MD  Date of Initial Visit:          Type: THERAPY  Noted: 2023    Patient seen for 68 sessions    Visit Diagnoses:    ICD-10-CM ICD-9-CM   1. Functional neurological symptom disorder with weakness or paralysis  F44.4 300.11   2. Lower paraplegia  G82.20 344.1       SUBJECTIVE     Subjective: Reports that she's been okay,  though her knees are hurting. Reports her knees and back are hurting today.     PAIN: 4/10     OBJECTIVE     Objective     Gait Training          29139   Task/Terrain Asst AD Comments   WS'ing + marching in // bars  R isc tub block, R knee block // bars  Cues to engage quads, glutes, plus wider stance    Ambulation in hallway R isc tub block, R knee block RW  Focus on abduction of RLE and kelsi glutes and quads prior to bearing weight onto RLE; stepping past RLE with L   Followed with WC         Timed Minutes 30      Manual Therapy     [id number]  Comments   Autogenic inhibition stretching R calf, HS    Octopus inflammation taping to RLE                 Timed Minutes 13              Therapy Education/Self Care 06278   Education offered today    Ongoing HEP   Access code: [email or web address]     Date: 10/12/2023  Prepared by: Maria R Hicks     Bed:  - Prone Knee Extension with Ankle Weight  - 2-3 x daily - 7 x weekly - 30-60 seconds hold  - Supine Bridge  - 2 x daily - 7 x weekly - 2 sets - 10 reps  - Supine March  - 1-2 x daily - 7 x weekly - 2 sets - 10 reps  - Supine Active Straight Leg Raise  - 1-2 x daily - 7 x weekly - 2 sets - 10 reps     Chair  - Seated Hamstring Stretch with Chair  - 1-2 x daily - 7 x weekly - 2 sets - 10 reps  - Belt Assisted Dorsiflexion   - 1-2 x daily - 7 x weekly -  "2 sets - 10 reps  - Standing Bilateral Gastroc Stretch with Step  - 1-2 x daily - 7 x weekly - 2 sets - 10 reps  - Seated Marching with Opposite Shoulder Flexion  - 1-2 x daily - 7 x weekly - 2 sets - 10 reps  - Seated Long Arc Quad  - 1-2 x daily - 7 x weekly - 2 sets - 10 reps  - Seated Gluteal Sets  - 1-2 x daily - 7 x weekly - 2 sets - 10 reps  - seated clamshells  - 1-2 x daily - 7 x weekly - 2 sets - 10 reps   Timed Minutes      Total Timed Treatment     43   mins  Total Time of Visit:             43   mins         ASSESSMENT/PLAN     GOALS  Goals                                            Progress Note due by 3/21/2024                                                             Recert due by 5/20/2024   LTG by: 12 weeks Comments Date Status   Patient will report compliance with HEP.  3x/week  2/20 ongoing   Pt will be able to ambulate >/=75' w/ LRAD in one continuous bout in </=5 mins, 0 rest breaks to improve capacity for safe household ambulation  max of 24' today 3/12 ongoing   Pt will stand unsupported for >/=5-10 mins to be able to perform tasks at kitchen counter.  1:40 today 3/12 ongoing   Pt will be able to perform SLS while also lifting contralateral side 6\" to assist with bathtub/shower t/f Unable to step up onto foam pad today  2/22 ongoing   Pt will be able to maintain static standing w/ 50/50 Wbing for >/=3 mins w/o UE support/a to improve capacity for ADL/IADLs 2:16.74  :42.47 sec  :59.16     2/20: stood for 1:23, brief ~5 sec bouts of 50/50 WB (using BR scales to determine)  Limited by knee pain with standing activities today  3/14 ongoing   Patient to perform TUG in </=2 mins w/ FWW without LOB for improved capacity for household ambulation. [place] 1:1:36.55  [place] 2: 1:45.73  1/23:  Trial 1: 1:50   Trial 2: unable to complete     2/20: 1:05 2/20 MET   Pt will be able to perform 5x STS w/ 50/50 Wbing in </=2 mins for improved functional strength Trail 1:1:36.55  Trail 2: " 1:45.73  1/23/24:  Trial 1: 1:50   Trial 2: unable to complete   2/8/24: asymmetrical WB  Trial 1: 19.43 sec  Trial 2: 15.02 sec      2/20: 16.47 secs but asymmetrical WB 2/20 Partially met;  improving   Improve R ankle DF to neutral 9/28/20023  3 deg from neutral PROM, no change   1 deg past neutral PROM  10/31 MET   Improve gross B LE strength to >/=4/5 See table  12/28 MET       Assessment/Plan     ASSESSMENT: Addressed from with ambulatory tasks today with emphasis on a wider base of support and quad and glut engagement prior to stepping onto RLE. She was also able to bring her L leg in front of her R minimally during ambulation today! Utilized R ischial tuberosity and knee block today to focus on improved gait mechanics. Required frequent rest breaks throughout session.     PLAN: Address all goals for progress note. If tolerated, continue working on static standing, utilize the Vibreflex, and strengthening her UE's    SIGNATURE: Cheryl Joyce PT DPT, [place] License #: [id number]    Electronically Signed on 3/19/2024        [address] [place] [address] Megan  [place] [address]  [phone]

## 2024-03-21 ENCOUNTER — TREATMENT (OUTPATIENT)
Dept: PHYSICAL THERAPY | Facility: CLINIC | Age: 69
End: 2024-03-21
Payer: MEDICARE

## 2024-03-21 DIAGNOSIS — F44.4 FUNCTIONAL NEUROLOGICAL SYMPTOM DISORDER WITH WEAKNESS OR PARALYSIS: Primary | ICD-10-CM

## 2024-03-21 DIAGNOSIS — G82.20 LOWER PARAPLEGIA: ICD-10-CM

## 2024-03-21 NOTE — PROGRESS NOTES
Physical Therapy Treatment Note and 30 Day Progress Note  [address] [place] [address]    Patient: Tawny Shin                                                 Visit Date: 3/21/2024  :     1955    Referring practitioner:    Moises Oliveira MD  Date of Initial Visit:          Type: THERAPY  Noted: 2023    Patient seen for 69 sessions    Visit Diagnoses:    ICD-10-CM ICD-9-CM   1. Functional neurological symptom disorder with weakness or paralysis  F44.4 300.11   2. Lower paraplegia  G82.20 344.1     SUBJECTIVE     Subjective:She reports bad back pain it started yesterday morning and she thinks it might have been the way she was walking during the last session      PAIN: 5/10         OBJECTIVE     Objective       Therapeutic Exercises    41710 Units Comments   B LE Shuttle Press with eccentric focus 6 bands x 6 min     B unilateral LE Shuttle Press with eccentric focus 3 bands x 6 min  each     Standing unsupported for maximum (see goals section)     Standing with B UE in // bars for maximum (see goals section)          Timed Minutes 45      Therapy Education/Self Care 02122   Education offered today    [place] Code    Ongoing HEP   Access code: [email or web address]     Date: 10/12/2023  Prepared by: Maria R Hicks     Bed:  - Prone Knee Extension with Ankle Weight  - 2-3 x daily - 7 x weekly - 30-60 seconds hold  - Supine Bridge  - 2 x daily - 7 x weekly - 2 sets - 10 reps  - Supine March  - 1-2 x daily - 7 x weekly - 2 sets - 10 reps  - Supine Active Straight Leg Raise  - 1-2 x daily - 7 x weekly - 2 sets - 10 reps     Chair  - Seated Hamstring Stretch with Chair  - 1-2 x daily - 7 x weekly - 2 sets - 10 reps  - Belt Assisted Dorsiflexion   - 1-2 x daily - 7 x weekly - 2 sets - 10 reps  - Standing Bilateral Gastroc Stretch with Step  - 1-2 x daily - 7 x weekly - 2 sets - 10 reps  - Seated Marching with Opposite Shoulder  "Flexion  - 1-2 x daily - 7 x weekly - 2 sets - 10 reps  - Seated Long Arc Quad  - 1-2 x daily - 7 x weekly - 2 sets - 10 reps  - Seated Gluteal Sets  - 1-2 x daily - 7 x weekly - 2 sets - 10 reps  - seated clamshells  - 1-2 x daily - 7 x weekly - 2 sets - 10 reps   Timed Minutes        Total Timed Treatment:     45   mins  Total Time of Visit:             45   mins         ASSESSMENT/PLAN     GOALS  Goals                                            Progress Note due by 2024                                                             Recert due by 2024   LTG by: 12 weeks Comments Date Status   Patient will report compliance with HEP.  Reinforced today  3/21 ongoing   Pt will be able to ambulate >/=75' w/ LRAD in one continuous bout in </=5 mins, 0 rest breaks to improve capacity for safe household ambulation  max of 24' on 3/12 3/24 ongoing   Pt will stand unsupported for >/=5-10 mins to be able to perform tasks at kitchen counter.  11.99 sec today 3/21 ongoing   Pt will be able to perform SLS while also lifting contralateral side 6\" to assist with bathtub/shower t/f Unable  3/21 ongoing   Pt will be able to maintain static standing w/ 50/50 Wbing for >/=3 mins w/o UE support/a to improve capacity for ADL/IADLs 2:.72 3/21 ongoing   Patient to perform TUG in </=2 mins w/ FWW without LOB for improved capacity for household ambulation. [place] 1:1:36.55  [place] 2: 1:45.73  :  Trial 1: 1:50   Trial 2: unable to complete      : 1:05  MET   Pt will be able to perform 5x STS w/ 50/50 Wbing in </=2 mins for improved functional strength Trail 1:1:36.55  [place] 2: 1:45.73  24:  Trial 1: 1:50   Trial 2: unable to complete   24: asymmetrical WB  Trial 1: 19.43 sec  Trial 2: 15.02 sec      : 16.47 secs but asymmetrical WB  Time  before able to assess 3/21 3/21 Partially met;  improving   Improve R ankle DF to neutral   3 deg from neutral PROM, no change   1 deg past neutral PROM  " 10/31 MET   Improve gross B LE strength to >/=4/5 See table  12/28 MET       Assessment/Plan     ASSESSMENT:   I did defer the retesting the goal for maximum distance walked today due to she had previous recent results and the new complaint of significant back pain. She continues so exhibit slow but steady progression as well as be pleasantly motivated and willing to attempted all tasks requested.    PLAN:   Assess maximum gait distance tolerance and 5 x STS during her next session to complete recent update.    SIGNATURE: Kentrell Lamb [facility], [place] License #: [id number]  Electronically Signed on 3/21/2024        [address] [place] [address]. [address]  [phone]

## 2024-03-21 NOTE — PROGRESS NOTES
Progress Note Addendum      Patient: Tawny Shin           : 1955  Visit Date: 3/21/2024  Referring practitioner: Moises Oliveira MD  Date of Initial Visit: Type: THERAPY  Noted: 2023  Patient seen for 69 sessions  Visit Diagnoses:    ICD-10-CM ICD-9-CM   1. Functional neurological symptom disorder with weakness or paralysis  F44.4 300.11   2. Lower paraplegia  G82.20 344.1          Clinical Progress: improved  Home Program Compliance: Yes  Progress toward previous goals: Partially Met  Prognosis to achieve goals: good    Objective   See PTA note for goals    Assessment & Plan       Assessment  Impairments: abnormal coordination, abnormal gait, abnormal muscle firing, abnormal muscle tone, abnormal or restricted ROM, activity intolerance, impaired balance, impaired physical strength, lacks appropriate home exercise program, pain with function, safety issue and weight-bearing intolerance   Functional limitations: carrying objects, lifting, sleeping, walking, pulling, pushing, uncomfortable because of pain, moving in bed, sitting, standing, stooping, reaching behind back, reaching overhead and unable to perform repetitive tasks   Prognosis: good    Plan  Therapy options: will be seen for skilled therapy services  Planned modality interventions: thermotherapy (hydrocollator packs), cryotherapy, low level laser therapy, TENS, dry needling, electrical stimulation/Maltese stimulation and ultrasound  Planned therapy interventions: abdominal trunk stabilization, manual therapy, ADL retraining, motor coordination training, balance/weight-bearing training, neuromuscular re-education, body mechanics training, postural training, soft tissue mobilization, spinal/joint mobilization, fine motor coordination training, flexibility, functional ROM exercises, strengthening, gait training, stretching, home exercise program, therapeutic activities, IADL retraining, joint mobilization and transfer  training  Frequency: 2x week  Duration in weeks: 12  Treatment plan discussed with: PTA        I reviewed the treatment and goals with Kentrell Lamb PTA and agree with the POC.    SIGNATURE: Cheryl Joyce PT DPT, License #: [id number]    Electronically Signed on 3/21/2024

## 2024-03-26 ENCOUNTER — TREATMENT (OUTPATIENT)
Dept: PHYSICAL THERAPY | Facility: CLINIC | Age: 69
End: 2024-03-26
Payer: MEDICARE

## 2024-03-26 DIAGNOSIS — G82.20 LOWER PARAPLEGIA: ICD-10-CM

## 2024-03-26 DIAGNOSIS — F44.4 FUNCTIONAL NEUROLOGICAL SYMPTOM DISORDER WITH WEAKNESS OR PARALYSIS: Primary | ICD-10-CM

## 2024-03-26 NOTE — PROGRESS NOTES
"                                                                Physical Therapy Treatment Note  [address] [place] [address]    Patient: Tawny Shin                                                 Visit Date: 3/26/2024  :     1955    Referring practitioner:    Moises Oliveira MD  Date of Initial Visit:          Type: THERAPY  Noted: 2023    Patient seen for 70 sessions    Visit Diagnoses:    ICD-10-CM ICD-9-CM   1. Functional neurological symptom disorder with weakness or paralysis  F44.4 300.11   2. Lower paraplegia  G82.20 344.1     SUBJECTIVE     Subjective: She reports, \"no aches and pains today.\"     PAIN: 0/10     OBJECTIVE     Objective     Gait Training          38321   Task/Terrain Asst AD Comments   Gait for distance with FWW CGA FWW 25 ft 3 in, 17 ft 11 in               Timed Minutes 10     Therapeutic Exercises    94939 Units Comments   B hip, hamstring, knee ext passive stretches in supine          Timed Minutes 10      Therapeutic Activities    66675 Comments   5x STS See goals   W/C <> table stand step t/f sup   Sit <> stand t/f  sup   Sit <> supine t/f sup       Timed Minutes 10     Neuromuscular Reeducation     26410 Comments   Lateral stepping for lateral WS and increasing WB through R LE Completed 10 reps on L SLS with R hip abd; unable with R SLS and L hip abd; fearful for R LE WB   Maintaining midline in // bars with scales  R: 70 lbs L 140 lbs   M/L WS in // bars with scales  R WS up to 80 lbs. L WS up to 160 lbs   VibraFlex: 15 Hz for 1 min  CGA-Jose R x1       Timed Minutes 15     Therapy Education/Self Care 53618   Education offered today    Ongoing HEP   Access code: [email or web address]     Date: 10/12/2023  Prepared by: Maria R Hicks     Bed:  - Prone Knee Extension with Ankle Weight  - 2-3 x daily - 7 x weekly - 30-60 seconds hold  - Supine Bridge  - 2 x daily - 7 x weekly - 2 sets - 10 reps  - Supine March  - 1-2 x daily - 7 x weekly - 2 sets - 10 reps  - " "Supine Active Straight Leg Raise  - 1-2 x daily - 7 x weekly - 2 sets - 10 reps     Chair  - Seated Hamstring Stretch with Chair  - 1-2 x daily - 7 x weekly - 2 sets - 10 reps  - Belt Assisted Dorsiflexion   - 1-2 x daily - 7 x weekly - 2 sets - 10 reps  - Standing Bilateral Gastroc Stretch with Step  - 1-2 x daily - 7 x weekly - 2 sets - 10 reps  - Seated Marching with Opposite Shoulder Flexion  - 1-2 x daily - 7 x weekly - 2 sets - 10 reps  - Seated Long Arc Quad  - 1-2 x daily - 7 x weekly - 2 sets - 10 reps  - Seated Gluteal Sets  - 1-2 x daily - 7 x weekly - 2 sets - 10 reps  - seated clamshells  - 1-2 x daily - 7 x weekly - 2 sets - 10 reps   Timed Minutes      Total Timed Treatment:     45   mins  Total Time of Visit:             45   mins         ASSESSMENT/PLAN     GOALS  Goals                                            Progress Note due by 4/20/2024                                                             Recert due by 5/20/2024   LTG by: 12 weeks Comments Date Status   Patient will report compliance with HEP.  Reports compliance 3x/week  3/26 ongoing   Pt will be able to ambulate >/=75' w/ LRAD in one continuous bout in </=5 mins, 0 rest breaks to improve capacity for safe household ambulation  max of 24' on 3/12/24  25 ft 3 in max on 3/26/2024 3/26 ongoing   Pt will stand unsupported for >/=5-10 mins to be able to perform tasks at kitchen counter.  11.99 sec today 3/21 ongoing   Pt will be able to perform SLS while also lifting contralateral side 6\" to assist with bathtub/shower t/f Unable  3/21 ongoing   Pt will be able to maintain static standing w/ 50/50 Wbing for >/=3 mins w/o UE support/a to improve capacity for ADL/IADLs 2:24.72 3/21 ongoing   Patient to perform TUG in </=2 mins w/ FWW without LOB for improved capacity for household ambulation. [place] 1:1:36.55  [place] 2: 1:45.73  1/23:  Trial 1: 1:50   Trial 2: unable to complete      2/20: 1:05 2/20 MET   Pt will be able to perform 5x STS w/ " 50/50 Wbing in </=2 mins for improved functional strength [place] 1:1:36.55  [place] 2: 1:45.73  1/23/24:  Trial 1: 1:50   Trial 2: unable to complete   2/8/24: asymmetrical WB  Trial 1: 19.43 sec  Trial 2: 15.02 sec   2/20: 16.47 secs but asymmetrical WB  3/26/24: improving, though asymmetrical WB  Trial 1: 14.36 sec  Trial 2: 12.29 sec -- 60/40 WB 3/26 Partially met;  improving   Improve R ankle DF to neutral 9/28/20023  3 deg from neutral PROM, no change   1 deg past neutral PROM  10/31 MET   Improve gross B LE strength to >/=4/5 See table  12/28 MET     Assessment/Plan     ASSESSMENT: Patient able to ambulate for one min longer than last assessment. Her STS score is drastically improving, though WB remains asymmetrical. Fortunately, this is improving as well. Pt able to maintain up to 80 lbs R LE WB vs 140 lbs L LE WB. She continues to be limited quite significantly by fear of fall, though reports cues for quad activation does help. Her ability to maintain flat foot on ground is improving as well.     PLAN: Consider hip ext stretches and continue to progress symmetrical WB.     SIGNATURE: Maria R Garay PTA, [place] License #: [id number]  Electronically Signed on 3/26/2024        [address] [place] [address], [place] [address]  [phone]

## 2024-04-02 ENCOUNTER — TREATMENT (OUTPATIENT)
Dept: PHYSICAL THERAPY | Facility: CLINIC | Age: 69
End: 2024-04-02
Payer: MEDICARE

## 2024-04-02 DIAGNOSIS — G82.20 LOWER PARAPLEGIA: ICD-10-CM

## 2024-04-02 DIAGNOSIS — F44.4 FUNCTIONAL NEUROLOGICAL SYMPTOM DISORDER WITH WEAKNESS OR PARALYSIS: Primary | ICD-10-CM

## 2024-04-02 NOTE — PROGRESS NOTES
Physical Therapy Treatment Note  [address] [place] [address]    Patient: Tawny Shin                                                 Visit Date: 2024  :     1955    Referring practitioner:    Moises Oliveira MD  Date of Initial Visit:          Type: THERAPY  Noted: 2023    Patient seen for 71 sessions    Visit Diagnoses:    ICD-10-CM ICD-9-CM   1. Functional neurological symptom disorder with weakness or paralysis  F44.4 300.11   2. Lower paraplegia  G82.20 344.1     SUBJECTIVE     Subjective: No new c/c, denies fall/near fall.     PAIN: /10 (knees)      OBJECTIVE     Objective     Gait Training          58988   Task/Terrain Asst AD Comments   Gait for distance with FWW SBA FWW 12 ft x2, 24 ft   Seated LAQ to help with TKE for stance phase  -- -- 2 x 10 , 2 lb ankle wt         Timed Minutes 15      Therapeutic Activities    48614 Comments   Standing for endurance  2 min, 30 sec, 1 min, 1-2 UE support and posterior LE support against table   Sitting unsupported on airex  5 min total   W/C <> sitting on mat table sup       Timed Minutes 30     Therapy Education/Self Care 92997   Education offered today    Ongoing HEP   Access code: [email or web address]     Date: 10/12/2023  Prepared by: Maria R Hicks     Bed:  - Prone Knee Extension with Ankle Weight  - 2-3 x daily - 7 x weekly - 30-60 seconds hold  - Supine Bridge  - 2 x daily - 7 x weekly - 2 sets - 10 reps  - Supine March  - 1-2 x daily - 7 x weekly - 2 sets - 10 reps  - Supine Active Straight Leg Raise  - 1-2 x daily - 7 x weekly - 2 sets - 10 reps     Chair  - Seated Hamstring Stretch with Chair  - 1-2 x daily - 7 x weekly - 2 sets - 10 reps  - Belt Assisted Dorsiflexion   - 1-2 x daily - 7 x weekly - 2 sets - 10 reps  - Standing Bilateral Gastroc Stretch with Step  - 1-2 x daily - 7 x weekly - 2 sets - 10 reps  - Seated Marching with Opposite Shoulder Flexion  -  "1-2 x daily - 7 x weekly - 2 sets - 10 reps  - Seated Long Arc Quad  - 1-2 x daily - 7 x weekly - 2 sets - 10 reps  - Seated Gluteal Sets  - 1-2 x daily - 7 x weekly - 2 sets - 10 reps  - seated clamshells  - 1-2 x daily - 7 x weekly - 2 sets - 10 reps   Timed Minutes      Total Timed Treatment:     45   mins  Total Time of Visit:             45   mins         ASSESSMENT/PLAN     GOALS  Goals                                            Progress Note due by 4/20/2024                                                             Recert due by 5/20/2024   LTG by: 12 weeks Comments Date Status   Patient will report compliance with HEP.  Reports compliance 3x/week  3/26 ongoing   Pt will be able to ambulate >/=75' w/ LRAD in one continuous bout in </=5 mins, 0 rest breaks to improve capacity for safe household ambulation  max of 24' on 3/12/24  25 ft 3 in max on 3/26/2024 3/26 ongoing   Pt will stand unsupported for >/=5-10 mins to be able to perform tasks at kitchen counter.  11.99 sec today 3/21 ongoing   Pt will be able to perform SLS while also lifting contralateral side 6\" to assist with bathtub/shower t/f Unable  3/21 ongoing   Pt will be able to maintain static standing w/ 50/50 Wbing for >/=3 mins w/o UE support/a to improve capacity for ADL/IADLs 2:24.72 3/21 ongoing   Patient to perform TUG in </=2 mins w/ FWW without LOB for improved capacity for household ambulation. [place] 1:1:36.55  [place] 2: 1:45.73  1/23:  Trial 1: 1:50   Trial 2: unable to complete      2/20: 1:05 2/20 MET   Pt will be able to perform 5x STS w/ 50/50 Wbing in </=2 mins for improved functional strength [place] 1:1:36.55  [place] 2: 1:45.73  1/23/24:  Trial 1: 1:50   Trial 2: unable to complete   2/8/24: asymmetrical WB  Trial 1: 19.43 sec  Trial 2: 15.02 sec   2/20: 16.47 secs but asymmetrical WB  3/26/24: improving, though asymmetrical WB  Trial 1: 14.36 sec  Trial 2: 12.29 sec -- 60/40 WB 3/26 Partially met;  improving   Improve R ankle DF to " neutral 9/28/20023  3 deg from neutral PROM, no change   1 deg past neutral PROM  10/31 MET   Improve gross B LE strength to >/=4/5 See table  12/28 MET     Assessment/Plan     ASSESSMENT: pt demonstrated difficulty maintaining erect midline while standing, requiring posterior LE and 1-2 UE support while standing. She was able to stand for a max of 2 min before seated rest break required, though reports feeling limited by LBP.     PLAN: Consider hip ext stretches and continue to progress symmetrical WB. Consider use of unicam for TKE and LE strengthening in reciprocal motion.     SIGNATURE: Maria R Garay PTA, [place] License #: [id number]  Electronically Signed on 4/2/2024        [address] [place] [address]. [address]  [phone]

## 2024-04-04 ENCOUNTER — TREATMENT (OUTPATIENT)
Dept: PHYSICAL THERAPY | Facility: CLINIC | Age: 69
End: 2024-04-04
Payer: MEDICARE

## 2024-04-04 DIAGNOSIS — F44.4 FUNCTIONAL NEUROLOGICAL SYMPTOM DISORDER WITH WEAKNESS OR PARALYSIS: ICD-10-CM

## 2024-04-04 DIAGNOSIS — G82.20 LOWER PARAPLEGIA: Primary | ICD-10-CM

## 2024-04-04 NOTE — PROGRESS NOTES
Physical Therapy Treatment Note  [address] [place] [address]    Patient: Tawny Shin                                                 Visit Date: 2024  :     1955    Referring practitioner:    Moises Oliveira MD  Date of Initial Visit:          Type: THERAPY  Noted: 2023    Patient seen for 72 sessions    Visit Diagnoses:    ICD-10-CM ICD-9-CM   1. Lower paraplegia  G82.20 344.1   2. Functional neurological symptom disorder with weakness or paralysis  F44.4 300.11     SUBJECTIVE     Subjective: No new c/c, denies fall/near fall.     PAIN: 0/10      OBJECTIVE     Objective     Gait Training          12537   Task/Terrain Asst AD Comments   Fwd walking in agility ladder  CGA FWW Emphasis on step through. Several instances of step through though unable TKE on R LE   L lateral stepping CGA   Jose R x1 FWW Difficulty with R SLS to advance L LE, added furniture slider x2 on L LE though difficulty with stability. Therefore attempt with 1 under forefoot   Fwd walking CGA FWW ~15 ft (red table to neuro gym doorway) before requesting seated rest break         Timed Minutes 30      Therapeutic Exercises    23080 Units Comments   Unicam, seat 0, resist level 1  5 min Assist to maintain R foot position   Step up/down onto unicam   Jose R x1   Sit <> stand from standard chair x3  modA initially to Jose R by end             Timed Minutes 8     Therapy Education/Self Care 80433   Education offered today    Ongoing HEP   Access code: [email or web address]     Date: 10/12/2023  Prepared by: Maria R Hicks     Bed:  - Prone Knee Extension with Ankle Weight  - 2-3 x daily - 7 x weekly - 30-60 seconds hold  - Supine Bridge  - 2 x daily - 7 x weekly - 2 sets - 10 reps  - Supine March  - 1-2 x daily - 7 x weekly - 2 sets - 10 reps  - Supine Active Straight Leg Raise  - 1-2 x daily - 7 x weekly - 2 sets - 10 reps     Chair  - Seated Hamstring Stretch  "with Chair  - 1-2 x daily - 7 x weekly - 2 sets - 10 reps  - Belt Assisted Dorsiflexion   - 1-2 x daily - 7 x weekly - 2 sets - 10 reps  - Standing Bilateral Gastroc Stretch with Step  - 1-2 x daily - 7 x weekly - 2 sets - 10 reps  - Seated Marching with Opposite Shoulder Flexion  - 1-2 x daily - 7 x weekly - 2 sets - 10 reps  - Seated Long Arc Quad  - 1-2 x daily - 7 x weekly - 2 sets - 10 reps  - Seated Gluteal Sets  - 1-2 x daily - 7 x weekly - 2 sets - 10 reps  - seated clamshells  - 1-2 x daily - 7 x weekly - 2 sets - 10 reps   Timed Minutes      Total Timed Treatment:     38   mins  Total Time of Visit:             40   mins         ASSESSMENT/PLAN     GOALS  Goals                                            Progress Note due by 4/20/2024                                                             Recert due by 5/20/2024   LTG by: 12 weeks Comments Date Status   Patient will report compliance with HEP.  Reports compliance 3x/week  3/26 ongoing   Pt will be able to ambulate >/=75' w/ LRAD in one continuous bout in </=5 mins, 0 rest breaks to improve capacity for safe household ambulation  max of 24' on 3/12/24  25 ft 3 in max on 3/26/2024 3/26 ongoing   Pt will stand unsupported for >/=5-10 mins to be able to perform tasks at kitchen counter.  11.99 sec today 3/21 ongoing   Pt will be able to perform SLS while also lifting contralateral side 6\" to assist with bathtub/shower t/f Unable, improving but difficulty with R TKE during stance face, unable to lift contralateral LE.  4/4 ongoing   Pt will be able to maintain static standing w/ 50/50 Wbing for >/=3 mins w/o UE support/a to improve capacity for ADL/IADLs 2:24.72 3/21 ongoing   Patient to perform TUG in </=2 mins w/ FWW without LOB for improved capacity for household ambulation. [place] 1:1:36.55  [place] 2: 1:45.73  1/23:  Trial 1: 1:50   Trial 2: unable to complete      2/20: 1:05 2/20 MET   Pt will be able to perform 5x STS w/ 50/50 Wbing in </=2 mins for " improved functional strength [place] 1:1:36.55  [place] 2: 1:45.73  1/23/24:  Trial 1: 1:50   Trial 2: unable to complete   2/8/24: asymmetrical WB  Trial 1: 19.43 sec  Trial 2: 15.02 sec   2/20: 16.47 secs but asymmetrical WB  3/26/24: improving, though asymmetrical WB  Trial 1: 14.36 sec  Trial 2: 12.29 sec -- 60/40 WB 3/26 Partially met;  improving   Improve R ankle DF to neutral 9/28/20023  3 deg from neutral PROM, no change   1 deg past neutral PROM  10/31 MET   Improve gross B LE strength to >/=4/5 See table  12/28 MET     Assessment/Plan     ASSESSMENT: Patient fatigued especially following use of unicam today. Difficulty advancing L LE, therefore utilized furniture slider. Though helpful, pt continued to demonstrate difficulty with adequate WS to allow advancing L LE. During fwd locomotion, pt able to perform step through, though continued to be limited by R knee ext and quad strength thus limiting TKE during stance phase. Her ability for flat foot during stance face is improving.      PLAN: Consider hip ext stretches and continue to progress symmetrical WB. Continue use of unicam for TKE and LE strengthening in reciprocal motion as appropriate. Consider quad strengthening to help with TKE during stance phase.     SIGNATURE: Maria R Garay PTA, [place] License #: [id number]  Electronically Signed on 4/4/2024        [address] [place] [address]. [address]  [phone]

## 2024-04-09 ENCOUNTER — TELEPHONE (OUTPATIENT)
Dept: INTERNAL MEDICINE | Facility: CLINIC | Age: 69
End: 2024-04-09
Payer: MEDICARE

## 2024-04-09 ENCOUNTER — TREATMENT (OUTPATIENT)
Dept: PHYSICAL THERAPY | Facility: CLINIC | Age: 69
End: 2024-04-09
Payer: MEDICARE

## 2024-04-09 DIAGNOSIS — F44.4 FUNCTIONAL NEUROLOGICAL SYMPTOM DISORDER WITH WEAKNESS OR PARALYSIS: ICD-10-CM

## 2024-04-09 DIAGNOSIS — G82.20 LOWER PARAPLEGIA: Primary | ICD-10-CM

## 2024-04-09 RX ORDER — LIDOCAINE 50 MG/G
1 PATCH TOPICAL DAILY PRN
Qty: 30 EACH | Refills: 2 | Status: SHIPPED | OUTPATIENT
Start: 2024-04-09

## 2024-04-09 NOTE — TELEPHONE ENCOUNTER
Caller: Tawny Shin    Relationship: Self    Best call back number: [phone]     What medication are you requesting: LIDOCAINE 5% PATCHES. USE AS NEEDED    What are your current symptoms: BACK PAIN AND FOOT PAIN    How long have you been experiencing symptoms: CHRONIC SYMPTOMS SHE HAS ALWAYS HAD    Have you had these symptoms before:    [x] Yes  [] No    Have you been treated for these symptoms before:   [x] Yes  [] No    If a prescription is needed, what is your preferred pharmacy and phone number:      Additional notes: PATIENT HAD BACK SURGERY AND HAS RHEUMATOID ARTHRITIS AND WAS PRESCRIBED THESE PATCHES PRIOR BY A IRIS [place]. PATIENT WAS IN NURSING REHAB AT THE TIME. PATIENT STATES THAT THESE PATCHES REALLY HELP AND IS WONDERING IF DR. CANNON WOULD PRESCRIBE. PLEASE CALL AND LET PATIENT KNOW        [facility] [address] [place], [place] [facility] [address] - [phone]  - [phone] FX

## 2024-04-09 NOTE — PROGRESS NOTES
"                                                                Physical Therapy Treatment Note  [address] [place] [address]    Patient: Tawny Shin                                                 Visit Date: 2024  :     1955    Referring practitioner:    Moises Oliveira MD  Date of Initial Visit:          Type: THERAPY  Noted: 2023    Patient seen for 73 sessions    Visit Diagnoses:    ICD-10-CM ICD-9-CM   1. Lower paraplegia  G82.20 344.1   2. Functional neurological symptom disorder with weakness or paralysis  F44.4 300.11       SUBJECTIVE     Subjective: No new c/c, denies fall/near fall. She rammed her R leg into a shelf and has an open wound on her R shin.    PAIN: 0/10      OBJECTIVE     Objective        Therapeutic Exercises    88676 Units Comments   stepping over/back bianca, 6\" block, 4\" block  PT blocking R knee   Resisted backward stepping w/ iso ABD 1 lap Yellow TB   SL bent knee hip ABD/ext     Timed Minutes 37     Therapeutic Activities    68563 Comments   Ind w/ stand pivot tx power WC <> mat table    Ind w/ bed mobility    Timed Minutes 8       Therapy Education/Self Care 37782   Education offered today    Ongoing HEP   Access code: [email or web address]     Date: 10/12/2023  Prepared by: Maria R Hicks     Bed:  - Prone Knee Extension with Ankle Weight  - 2-3 x daily - 7 x weekly - 30-60 seconds hold  - Supine Bridge  - 2 x daily - 7 x weekly - 2 sets - 10 reps  - Supine March  - 1-2 x daily - 7 x weekly - 2 sets - 10 reps  - Supine Active Straight Leg Raise  - 1-2 x daily - 7 x weekly - 2 sets - 10 reps     Chair  - Seated Hamstring Stretch with Chair  - 1-2 x daily - 7 x weekly - 2 sets - 10 reps  - Belt Assisted Dorsiflexion   - 1-2 x daily - 7 x weekly - 2 sets - 10 reps  - Standing Bilateral Gastroc Stretch with Step  - 1-2 x daily - 7 x weekly - 2 sets - 10 reps  - Seated Marching with Opposite Shoulder Flexion  - 1-2 x daily - 7 x weekly - 2 sets - 10 reps  - " "Seated Long Arc Quad  - 1-2 x daily - 7 x weekly - 2 sets - 10 reps  - Seated Gluteal Sets  - 1-2 x daily - 7 x weekly - 2 sets - 10 reps  - seated clamshells  - 1-2 x daily - 7 x weekly - 2 sets - 10 reps   Timed Minutes      Total Timed Treatment:     45   mins  Total Time of Visit:             45   mins         ASSESSMENT/PLAN     GOALS  Goals                                            Progress Note due by 4/20/2024                                                             Recert due by 5/20/2024   LTG by: 12 weeks Comments Date Status   Patient will report compliance with HEP.  Reports compliance 3x/week  3/26 ongoing   Pt will be able to ambulate >/=75' w/ LRAD in one continuous bout in </=5 mins, 0 rest breaks to improve capacity for safe household ambulation  max of 24' on 3/12/24  25 ft 3 in max on 3/26/2024 3/26 ongoing   Pt will stand unsupported for >/=5-10 mins to be able to perform tasks at kitchen counter.  11.99 sec today 3/21 ongoing   Pt will be able to perform SLS while also lifting contralateral side 6\" to assist with bathtub/shower t/f Unable, improving but difficulty with R TKE during stance face, unable to lift contralateral LE.  4/4 ongoing   Pt will be able to maintain static standing w/ 50/50 Wbing for >/=3 mins w/o UE support/a to improve capacity for ADL/IADLs 2:24.72 3/21 ongoing   Patient to perform TUG in </=2 mins w/ FWW without LOB for improved capacity for household ambulation. [place] 1:1:36.55  [place] 2: 1:45.73  1/23:  Trial 1: 1:50   Trial 2: unable to complete      2/20: 1:05 2/20 MET   Pt will be able to perform 5x STS w/ 50/50 Wbing in </=2 mins for improved functional strength [place] 1:1:36.55  [place] 2: 1:45.73  1/23/24:  Trial 1: 1:50   Trial 2: unable to complete   2/8/24: asymmetrical WB  Trial 1: 19.43 sec  Trial 2: 15.02 sec   2/20: 16.47 secs but asymmetrical WB  3/26/24: improving, though asymmetrical WB  Trial 1: 14.36 sec  Trial 2: 12.29 sec -- 60/40 WB 3/26 Partially " met;  improving   Improve R ankle DF to neutral 9/28/20023  3 deg from neutral PROM, no change   1 deg past neutral PROM  10/31 MET   Improve gross B LE strength to >/=4/5 See table  12/28 MET     Assessment/Plan     ASSESSMENT: Advised pt to apply dry covering to wound on R shin w/ periods of air exposure as it was actively draining clear serous fluid in clinic today.  Pt was noted to have moderate sizeable  palpable mass in her R superior glute max w/ slight red discoloration observed. This does not present as MSK in nature and she was advised to get this examined asap by a physician d/t h/o infection in the same region. She continues to have difficulty w/ R LE stability during stance d/t impaired quadriceps and glute strength. Her back pain limited her tolerance to dynamic standing activity today.    PLAN: Consider hip ext stretches and continue to progress symmetrical WB. Continue use of unicam for TKE and LE strengthening in reciprocal motion as appropriate. Consider quad strengthening to help with TKE during stance phase.     SIGNATURE: Molly Garrett PT [place], [place] License #: [id number]  Electronically Signed on 4/9/2024        [place] [address]  [phone]

## 2024-04-11 ENCOUNTER — TREATMENT (OUTPATIENT)
Dept: PHYSICAL THERAPY | Facility: CLINIC | Age: 69
End: 2024-04-11
Payer: MEDICARE

## 2024-04-11 DIAGNOSIS — G82.20 LOWER PARAPLEGIA: Primary | ICD-10-CM

## 2024-04-11 DIAGNOSIS — F44.4 FUNCTIONAL NEUROLOGICAL SYMPTOM DISORDER WITH WEAKNESS OR PARALYSIS: ICD-10-CM

## 2024-04-11 PROCEDURE — 97110 THERAPEUTIC EXERCISES: CPT | Performed by: PHYSICAL THERAPIST

## 2024-04-11 NOTE — PROGRESS NOTES
Physical Therapy Treatment Note  [address] [place] [address]    Patient: Tawny Shin                                                 Visit Date: 2024  :     1955    Referring practitioner:    Moises Oliveira MD  Date of Initial Visit:          Type: THERAPY  Noted: 2023    Patient seen for 74 sessions    Visit Diagnoses:    ICD-10-CM ICD-9-CM   1. Lower paraplegia  G82.20 344.1   2. Functional neurological symptom disorder with weakness or paralysis  F44.4 300.11     SUBJECTIVE     Subjective:She denies new complaints       PAIN: 0/10         OBJECTIVE     Objective       Therapeutic Exercises    00730 Units Comments   B unilateral gravity eliminated hip ext with Egg x 15     B unilateral SL clamshells x 15     B unilateral SL IR x 15      3 reps stand for max (see goals section)          Timed Minutes 45      Therapy Education/Self Care [id number]   Education offered today    Medbride Code    Ongoing HEP   Access code: [email or web address]     Date: 10/12/2023  Prepared by: Maria R Hicks     Bed:  - Prone Knee Extension with Ankle Weight  - 2-3 x daily - 7 x weekly - 30-60 seconds hold  - Supine Bridge  - 2 x daily - 7 x weekly - 2 sets - 10 reps  - Supine March  - 1-2 x daily - 7 x weekly - 2 sets - 10 reps  - Supine Active Straight Leg Raise  - 1-2 x daily - 7 x weekly - 2 sets - 10 reps     Chair  - Seated Hamstring Stretch with Chair  - 1-2 x daily - 7 x weekly - 2 sets - 10 reps  - Belt Assisted Dorsiflexion   - 1-2 x daily - 7 x weekly - 2 sets - 10 reps  - Standing Bilateral Gastroc Stretch with Step  - 1-2 x daily - 7 x weekly - 2 sets - 10 reps  - Seated Marching with Opposite Shoulder Flexion  - 1-2 x daily - 7 x weekly - 2 sets - 10 reps  - Seated Long Arc Quad  - 1-2 x daily - 7 x weekly - 2 sets - 10 reps  - Seated Gluteal Sets  - 1-2 x daily - 7 x weekly - 2 sets - 10 reps  - seated clamshells  - 1-2 x daily  "- 7 x weekly - 2 sets - 10 reps   Timed Minutes        Total Timed Treatment:     45   mins  Total Time of Visit:             45   mins         ASSESSMENT/PLAN     GOALS  Goals                                            Progress Note due by 4/20/2024                                                             Recert due by 5/20/2024   LTG by: 12 weeks Comments Date Status   Patient will report compliance with HEP.  Reports compliance 3x/week  3/26 ongoing   Pt will be able to ambulate >/=75' w/ LRAD in one continuous bout in </=5 mins, 0 rest breaks to improve capacity for safe household ambulation  max of 24' on 3/12/24  25 ft 3 in max on 3/26/2024 3/26 ongoing   Pt will stand unsupported for >/=5-10 mins to be able to perform tasks at kitchen counter.  11.99 sec today 3/21 ongoing   Pt will be able to perform SLS while also lifting contralateral side 6\" to assist with bathtub/shower t/f Unable, improving but difficulty with R TKE during stance face, unable to lift contralateral LE.  4/4 ongoing   Pt will be able to maintain static standing w/ 50/50 Wbing for >/=3 mins w/o UE support/a to improve capacity for ADL/IADLs 1= 1:47  2=1:56  3= 2:07 4/11 ongoing   Patient to perform TUG in </=2 mins w/ FWW without LOB for improved capacity for household ambulation. [place] 1:1:36.55  [place] 2: 1:45.73  1/23:  Trial 1: 1:50   Trial 2: unable to complete      2/20: 1:05 2/20 MET   Pt will be able to perform 5x STS w/ 50/50 Wbing in </=2 mins for improved functional strength [place] 1:1:36.55  [place] 2: 1:45.73  1/23/24:  Trial 1: 1:50   Trial 2: unable to complete   2/8/24: asymmetrical WB  Trial 1: 19.43 sec  Trial 2: 15.02 sec   2/20: 16.47 secs but asymmetrical WB  3/26/24: improving, though asymmetrical WB  Trial 1: 14.36 sec  Trial 2: 12.29 sec -- 60/40 WB 3/26 Partially met;  improving   Improve R ankle DF to neutral 9/28/20023  3 deg from neutral PROM, no change   1 deg past neutral PROM  10/31 MET   Improve gross B LE " strength to >/=4/5 See table  12/28 MET       Assessment/Plan     ASSESSMENT:   At the beginning of the session I inspected the two sites of skin irritation and the spot on her R LE had some clear discharge that later during the session required a 2 x 4 bandaid. In regard to the spot on her R buttock area it was red with intact skin with no discharge. With palpation the site did have a little hard and for lack of a better term grainy appearance.     PLAN:   Review all remaining goals for a progress note    SIGNATURE: Kentrell Lamb [facility], [place] License #: [id number]  Electronically Signed on 4/11/2024        [address] [place] [address]. [address]  [phone]

## 2024-04-16 ENCOUNTER — TREATMENT (OUTPATIENT)
Dept: PHYSICAL THERAPY | Facility: CLINIC | Age: 69
End: 2024-04-16
Payer: MEDICARE

## 2024-04-16 DIAGNOSIS — G82.20 LOWER PARAPLEGIA: Primary | ICD-10-CM

## 2024-04-16 DIAGNOSIS — F44.4 FUNCTIONAL NEUROLOGICAL SYMPTOM DISORDER WITH WEAKNESS OR PARALYSIS: ICD-10-CM

## 2024-04-16 PROCEDURE — 97116 GAIT TRAINING THERAPY: CPT

## 2024-04-16 PROCEDURE — 97110 THERAPEUTIC EXERCISES: CPT

## 2024-04-16 NOTE — PROGRESS NOTES
Physical Therapy Treatment Note and 30 Day Progress Note  [address] [place] [address]    Patient: Tawny Shin                                                 Visit Date: 2024  :     1955    Referring practitioner:    Moises Oliveira MD  Date of Initial Visit:          Type: THERAPY  Noted: 2023    Patient seen for 75 sessions    Visit Diagnoses:    ICD-10-CM ICD-9-CM   1. Lower paraplegia  G82.20 344.1   2. Functional neurological symptom disorder with weakness or paralysis  F44.4 300.11       SUBJECTIVE     Subjective: Nothing new to report.    PAIN: /10         OBJECTIVE     Objective       Therapeutic Exercises    12097 Units Comments   STS from elevated mat table no UE support/a 10    Staggered SL STS L foot in front 2*5    Sitting unsupported resistance at feet unsupported 10 B Yellow TB   Sitting unsupported resisted LAQ resistance at feet 10 B Yellow TB   Timed Minutes 35      Gait Training          66341   Task/Terrain Asst AD Comments   ambulation WC follow FWW ~10', 8', 5'   Timed Minutes 10        Therapy Education/Self Care [id number]   Education offered today    Medbride Code    Ongoing HEP   Access code: [email or web address]     Date: 10/12/2023  Prepared by: Maria R Hicks     Bed:  - Prone Knee Extension with Ankle Weight  - 2-3 x daily - 7 x weekly - 30-60 seconds hold  - Supine Bridge  - 2 x daily - 7 x weekly - 2 sets - 10 reps  - Supine March  - 1-2 x daily - 7 x weekly - 2 sets - 10 reps  - Supine Active Straight Leg Raise  - 1-2 x daily - 7 x weekly - 2 sets - 10 reps     Chair  - Seated Hamstring Stretch with Chair  - 1-2 x daily - 7 x weekly - 2 sets - 10 reps  - Belt Assisted Dorsiflexion   - 1-2 x daily - 7 x weekly - 2 sets - 10 reps  - Standing Bilateral Gastroc Stretch with Step  - 1-2 x daily - 7 x weekly - 2 sets - 10 reps  - Seated Marching with Opposite Shoulder Flexion  - 1-2 x daily - 7 x  "weekly - 2 sets - 10 reps  - Seated Long Arc Quad  - 1-2 x daily - 7 x weekly - 2 sets - 10 reps  - Seated Gluteal Sets  - 1-2 x daily - 7 x weekly - 2 sets - 10 reps  - seated clamshells  - 1-2 x daily - 7 x weekly - 2 sets - 10 reps   Timed Minutes        Total Timed Treatment:     45   mins  Total Time of Visit:             45   mins         ASSESSMENT/PLAN     GOALS  Goals                                            Progress Note due by 5/16/2024                                                             Recert due by 5/20/2024   LTG by: 12 weeks Comments Date Status   Patient will report compliance with HEP.  Reports compliance 3x/week  4/16 ongoing   Pt will be able to ambulate >/=75' w/ LRAD in one continuous bout in </=5 mins, 0 rest breaks to improve capacity for safe household ambulation  max of 24' on 3/12/24  25 ft 3 in max on 3/26/2024 4/16 ongoing   Pt will stand unsupported for >/=5-10 mins to be able to perform tasks at kitchen counter.  11.99 sec today 4/16 ongoing   Pt will be able to perform SLS while also lifting contralateral side 6\" to assist with bathtub/shower t/f Unable, improving but difficulty with R TKE during stance face, unable to lift contralateral LE.  4/16 ongoing   Pt will be able to maintain static standing w/ 50/50 Wbing for >/=3 mins w/o UE support/a to improve capacity for ADL/IADLs 1= 1:47  2=1:56  3= 2:07 4/16 ongoing   Patient to perform TUG in </=2 mins w/ FWW without LOB for improved capacity for household ambulation. [place] 1:1:36.55  [place] 2: 1:45.73  1/23:  Trial 1: 1:50   Trial 2: unable to complete      2/20: 1:05 2/20 MET   Pt will be able to perform 5x STS w/ 50/50 Wbing in </=2 mins for improved functional strength [place] 1:1:36.55  [place] 2: 1:45.73  1/23/24:  Trial 1: 1:50   Trial 2: unable to complete   2/8/24: asymmetrical WB  Trial 1: 19.43 sec  Trial 2: 15.02 sec   2/20: 16.47 secs but asymmetrical WB  3/26/24: improving, though asymmetrical WB  Trial 1: 14.36 " sec  Trial 2: 12.29 sec -- 60/40 WB 4/16 Partially met;  improving   Improve R ankle DF to neutral 9/28/20023  3 deg from neutral PROM, no change   1 deg past neutral PROM  10/31 MET   Improve gross B LE strength to >/=4/5 See table  12/28 MET       Assessment & Plan       Assessment  Impairments: abnormal coordination, abnormal gait, abnormal muscle firing, abnormal muscle tone, abnormal or restricted ROM, activity intolerance, impaired balance, impaired physical strength, lacks appropriate home exercise program, pain with function, safety issue and weight-bearing intolerance   Functional limitations: carrying objects, lifting, sleeping, walking, pulling, pushing, uncomfortable because of pain, moving in bed, sitting, standing, stooping, reaching behind back, reaching overhead and unable to perform repetitive tasks   Prognosis: good    Plan  Therapy options: will be seen for skilled therapy services  Planned modality interventions: thermotherapy (hydrocollator packs), cryotherapy, low level laser therapy, TENS, dry needling, electrical stimulation/N[place]ien stimulation and ultrasound  Planned therapy interventions: abdominal trunk stabilization, manual therapy, ADL retraining, motor coordination training, balance/weight-bearing training, neuromuscular re-education, body mechanics training, postural training, soft tissue mobilization, spinal/joint mobilization, fine motor coordination training, flexibility, functional ROM exercises, strengthening, gait training, stretching, home exercise program, therapeutic activities, IADL retraining, joint mobilization and transfer training  Frequency: 2x week  Duration in weeks: 12  Treatment plan discussed with: PTA         ASSESSMENT: Emphasis on improving functional LE strength then following w/ gait training to improve functional activity tolerance endurance.    PLAN: ADDRESS ALL GOALS FOR RE-EVALUATION NEXT SESSION. progress functional strengthening, gait training, balance  re-ed, RLE flexibility per pt tolerance    SIGNATURE: Molly Garrett PT DPT, [place] License #: [id number]  Electronically Signed on 4/22/2024        [address] [facility] [place] [address]  [phone]

## 2024-04-18 ENCOUNTER — TREATMENT (OUTPATIENT)
Dept: PHYSICAL THERAPY | Facility: CLINIC | Age: 69
End: 2024-04-18
Payer: MEDICARE

## 2024-04-18 DIAGNOSIS — G82.20 LOWER PARAPLEGIA: Primary | ICD-10-CM

## 2024-04-18 DIAGNOSIS — F44.4 FUNCTIONAL NEUROLOGICAL SYMPTOM DISORDER WITH WEAKNESS OR PARALYSIS: ICD-10-CM

## 2024-04-18 PROCEDURE — 97110 THERAPEUTIC EXERCISES: CPT | Performed by: PHYSICAL THERAPIST

## 2024-04-18 PROCEDURE — 97116 GAIT TRAINING THERAPY: CPT | Performed by: PHYSICAL THERAPIST

## 2024-04-18 NOTE — PROGRESS NOTES
"                                                                Physical Therapy Treatment Note  [address] [place] [address]    Patient: Tawny Shin                                                 Visit Date: 2024  :     1955    Referring practitioner:    Moises Oliveira MD  Date of Initial Visit:          Type: THERAPY  Noted: 2023    Patient seen for 76 sessions    Visit Diagnoses:    ICD-10-CM ICD-9-CM   1. Lower paraplegia  G82.20 344.1   2. Functional neurological symptom disorder with weakness or paralysis  F44.4 300.11     SUBJECTIVE     Subjective:She reports she hurts all over and thinks it's because of rain.       PAIN: 3/10         OBJECTIVE     Objective   .  Gait Training          86633   Task/Terrain Asst AD Comments   Walkiing for max tolerance x 3 reps with brief  CGA followed with WC Rwx 8' 5\", 11' 4\"               Timed Minutes 20      Therapeutic Exercises    11037 Units Comments   B pec and thoracic stretches with 1/2 roller      B unilateral hamstring stretches in sitting     B unilateral LAQ's #4 x 20      B unilateral seated hip flexion #4 x 20           Timed Minutes 23        Therapy Education/Self Care [id number]   Education offered today    Rakesh Code    Ongoing HEP   Access code: [email or web address]     Date: 10/12/2023  Prepared by: Maria R Hicks     Bed:  - Prone Knee Extension with Ankle Weight  - 2-3 x daily - 7 x weekly - 30-60 seconds hold  - Supine Bridge  - 2 x daily - 7 x weekly - 2 sets - 10 reps  - Supine March  - 1-2 x daily - 7 x weekly - 2 sets - 10 reps  - Supine Active Straight Leg Raise  - 1-2 x daily - 7 x weekly - 2 sets - 10 reps     Chair  - Seated Hamstring Stretch with Chair  - 1-2 x daily - 7 x weekly - 2 sets - 10 reps  - Belt Assisted Dorsiflexion   - 1-2 x daily - 7 x weekly - 2 sets - 10 reps  - Standing Bilateral Gastroc Stretch with Step  - 1-2 x daily - 7 x weekly - 2 sets - 10 reps  - Seated Marching with Opposite Shoulder " "Flexion  - 1-2 x daily - 7 x weekly - 2 sets - 10 reps  - Seated Long Arc Quad  - 1-2 x daily - 7 x weekly - 2 sets - 10 reps  - Seated Gluteal Sets  - 1-2 x daily - 7 x weekly - 2 sets - 10 reps  - seated clamshells  - 1-2 x daily - 7 x weekly - 2 sets - 10 reps   Timed Minutes        Total Timed Treatment:     43   mins  Total Time of Visit:             43   mins         ASSESSMENT/PLAN     GOALS  Goals                                            Progress Note due by 5/16/2024                                                             Recert due by 5/20/2024   LTG by: 12 weeks Comments Date Status   Patient will report compliance with HEP.  Reports compliance 3x/week  3/26 ongoing   Pt will be able to ambulate >/=75' w/ LRAD in one continuous bout in </=5 mins, 0 rest breaks to improve capacity for safe household ambulation  max of 24' on 3/12/24  25 ft 3 in max on 3/26/2024 3/26 ongoing   Pt will stand unsupported for >/=5-10 mins to be able to perform tasks at kitchen counter.  11.99 sec today 3/21 ongoing   Pt will be able to perform SLS while also lifting contralateral side 6\" to assist with bathtub/shower t/f Unable, improving but difficulty with R TKE during stance face, unable to lift contralateral LE.  4/4 ongoing   Pt will be able to maintain static standing w/ 50/50 Wbing for >/=3 mins w/o UE support/a to improve capacity for ADL/IADLs 1= 1:47  2=1:56  3= 2:07 4/11 ongoing   Patient to perform TUG in </=2 mins w/ FWW without LOB for improved capacity for household ambulation. [place] 1:1:36.55  [place] 2: 1:45.73  1/23:  Trial 1: 1:50   Trial 2: unable to complete      2/20: 1:05 2/20 MET   Pt will be able to perform 5x STS w/ 50/50 Wbing in </=2 mins for improved functional strength [place] 1:1:36.55  [place] 2: 1:45.73  1/23/24:  Trial 1: 1:50   Trial 2: unable to complete   2/8/24: asymmetrical WB  Trial 1: 19.43 sec  Trial 2: 15.02 sec   2/20: 16.47 secs but asymmetrical WB  3/26/24: improving, though " asymmetrical WB  Trial 1: 14.36 sec  Trial 2: 12.29 sec -- 60/40 WB 3/26 Partially met;  improving   Improve R ankle DF to neutral 9/28/20023  3 deg from neutral PROM, no change   1 deg past neutral PROM  10/31 MET   Improve gross B LE strength to >/=4/5 See table  12/28 MET       Assessment/Plan     ASSESSMENT:   She struggled a bit more with her gait performance today especially in regards to distance. She did better than I expected with the LAQ's today but needed a little more cueing for full RROM with seated L hip flexion.    PLAN:   Consider hip ext stretches and continue to progress symmetrical WB.     SIGNATURE: Kentrell Lamb \[facility]\"", [place] License #: [id number]  Electronically Signed on 4/18/2024        [address] [place] [address]. [address]  [phone]

## 2024-04-19 ENCOUNTER — APPOINTMENT (OUTPATIENT)
Dept: ULTRASOUND IMAGING | Facility: HOSPITAL | Age: 69
End: 2024-04-19
Payer: MEDICARE

## 2024-04-19 ENCOUNTER — APPOINTMENT (OUTPATIENT)
Dept: CT IMAGING | Facility: HOSPITAL | Age: 69
End: 2024-04-19
Payer: MEDICARE

## 2024-04-19 ENCOUNTER — APPOINTMENT (OUTPATIENT)
Dept: GENERAL RADIOLOGY | Facility: HOSPITAL | Age: 69
End: 2024-04-19
Payer: MEDICARE

## 2024-04-19 ENCOUNTER — HOSPITAL ENCOUNTER (INPATIENT)
Facility: HOSPITAL | Age: 69
LOS: 4 days | Discharge: HOME | End: 2024-04-23
Attending: EMERGENCY MEDICINE | Admitting: FAMILY MEDICINE
Payer: MEDICARE

## 2024-04-19 DIAGNOSIS — E87.6 HYPOKALEMIA: ICD-10-CM

## 2024-04-19 DIAGNOSIS — R22.2 NODULE OF BUTTOCK: ICD-10-CM

## 2024-04-19 DIAGNOSIS — K80.20 CALCULUS OF GALLBLADDER WITHOUT CHOLECYSTITIS WITHOUT OBSTRUCTION: ICD-10-CM

## 2024-04-19 DIAGNOSIS — R50.9 FEVER, UNSPECIFIED FEVER CAUSE: ICD-10-CM

## 2024-04-19 DIAGNOSIS — Z74.09 IMPAIRED MOBILITY: ICD-10-CM

## 2024-04-19 DIAGNOSIS — N39.0 ACUTE UTI: Primary | ICD-10-CM

## 2024-04-19 DIAGNOSIS — N18.9 CHRONIC RENAL IMPAIRMENT, UNSPECIFIED CKD STAGE: ICD-10-CM

## 2024-04-19 DIAGNOSIS — L03.115 CELLULITIS OF RIGHT LEG: ICD-10-CM

## 2024-04-19 LAB
ALBUMIN SERPL-MCNC: 3.3 G/DL (ref 3.5–5.2)
ALBUMIN/GLOB SERPL: 0.9 G/DL
ALP SERPL-CCNC: 70 U/L (ref 39–117)
ALT SERPL W P-5'-P-CCNC: 25 U/L (ref 1–33)
AMORPH URATE CRY URNS QL MICRO: ABNORMAL /HPF
ANION GAP SERPL CALCULATED.3IONS-SCNC: 10 MMOL/L (ref 5–15)
AST SERPL-CCNC: 23 U/L (ref 1–32)
B PARAPERT DNA SPEC QL NAA+PROBE: NOT DETECTED
B PERT DNA SPEC QL NAA+PROBE: NOT DETECTED
BACTERIA BLD CULT: ABNORMAL
BACTERIA UR QL AUTO: ABNORMAL /HPF
BASOPHILS # BLD AUTO: 0.03 10*3/MM3 (ref 0–0.2)
BASOPHILS NFR BLD AUTO: 0.4 % (ref 0–1.5)
BILIRUB SERPL-MCNC: 0.4 MG/DL (ref 0–1.2)
BILIRUB UR QL STRIP: NEGATIVE
BOTTLE TYPE: ABNORMAL
BUN SERPL-MCNC: 23 MG/DL (ref 8–23)
BUN/CREAT SERPL: 19 (ref 7–25)
C PNEUM DNA NPH QL NAA+NON-PROBE: NOT DETECTED
CALCIUM SPEC-SCNC: 9.5 MG/DL (ref 8.6–10.5)
CHLORIDE SERPL-SCNC: 101 MMOL/L (ref 98–107)
CLARITY UR: ABNORMAL
CO2 SERPL-SCNC: 30 MMOL/L (ref 22–29)
COLOR UR: YELLOW
CREAT SERPL-MCNC: 1.21 MG/DL (ref 0.57–1)
D DIMER PPP FEU-MCNC: 1.63 MCGFEU/ML (ref 0–0.7)
D-LACTATE SERPL-SCNC: 1.6 MMOL/L (ref 0.5–2)
DEPRECATED RDW RBC AUTO: 58 FL (ref 37–54)
EGFRCR SERPLBLD CKD-EPI 2021: 48.3 ML/MIN/1.73
EOSINOPHIL # BLD AUTO: 0.06 10*3/MM3 (ref 0–0.4)
EOSINOPHIL NFR BLD AUTO: 0.8 % (ref 0.3–6.2)
ERYTHROCYTE [DISTWIDTH] IN BLOOD BY AUTOMATED COUNT: 16.7 % (ref 12.3–15.4)
FLUAV SUBTYP SPEC NAA+PROBE: NOT DETECTED
FLUBV RNA ISLT QL NAA+PROBE: NOT DETECTED
GLOBULIN UR ELPH-MCNC: 3.6 GM/DL
GLUCOSE BLDC GLUCOMTR-MCNC: 185 MG/DL (ref 70–130)
GLUCOSE SERPL-MCNC: 131 MG/DL (ref 65–99)
GLUCOSE UR STRIP-MCNC: NEGATIVE MG/DL
HADV DNA SPEC NAA+PROBE: NOT DETECTED
HCOV 229E RNA SPEC QL NAA+PROBE: NOT DETECTED
HCOV HKU1 RNA SPEC QL NAA+PROBE: NOT DETECTED
HCOV NL63 RNA SPEC QL NAA+PROBE: NOT DETECTED
HCOV OC43 RNA SPEC QL NAA+PROBE: NOT DETECTED
HCT VFR BLD AUTO: 33.9 % (ref 34–46.6)
HGB BLD-MCNC: 10.6 G/DL (ref 12–15.9)
HGB UR QL STRIP.AUTO: ABNORMAL
HMPV RNA NPH QL NAA+NON-PROBE: NOT DETECTED
HPIV1 RNA ISLT QL NAA+PROBE: NOT DETECTED
HPIV2 RNA SPEC QL NAA+PROBE: NOT DETECTED
HPIV3 RNA NPH QL NAA+PROBE: NOT DETECTED
HPIV4 P GENE NPH QL NAA+PROBE: NOT DETECTED
HYALINE CASTS UR QL AUTO: ABNORMAL /LPF
IMM GRANULOCYTES # BLD AUTO: 0.07 10*3/MM3 (ref 0–0.05)
IMM GRANULOCYTES NFR BLD AUTO: 0.9 % (ref 0–0.5)
KETONES UR QL STRIP: NEGATIVE
LEUKOCYTE ESTERASE UR QL STRIP.AUTO: ABNORMAL
LIPASE SERPL-CCNC: 16 U/L (ref 13–60)
LYMPHOCYTES # BLD AUTO: 1.23 10*3/MM3 (ref 0.7–3.1)
LYMPHOCYTES NFR BLD AUTO: 16.5 % (ref 19.6–45.3)
M PNEUMO IGG SER IA-ACNC: NOT DETECTED
MAGNESIUM SERPL-MCNC: 1.6 MG/DL (ref 1.6–2.4)
MCH RBC QN AUTO: 30.1 PG (ref 26.6–33)
MCHC RBC AUTO-ENTMCNC: 31.3 G/DL (ref 31.5–35.7)
MCV RBC AUTO: 96.3 FL (ref 79–97)
MONOCYTES # BLD AUTO: 0.87 10*3/MM3 (ref 0.1–0.9)
MONOCYTES NFR BLD AUTO: 11.7 % (ref 5–12)
NEUTROPHILS NFR BLD AUTO: 5.2 10*3/MM3 (ref 1.7–7)
NEUTROPHILS NFR BLD AUTO: 69.7 % (ref 42.7–76)
NITRITE UR QL STRIP: POSITIVE
NRBC BLD AUTO-RTO: 0.3 /100 WBC (ref 0–0.2)
NT-PROBNP SERPL-MCNC: 930.2 PG/ML (ref 0–900)
PH UR STRIP.AUTO: 7 [PH] (ref 5–8)
PLATELET # BLD AUTO: 163 10*3/MM3 (ref 140–450)
PMV BLD AUTO: 11 FL (ref 6–12)
POTASSIUM SERPL-SCNC: 2.9 MMOL/L (ref 3.5–5.2)
PROCALCITONIN SERPL-MCNC: 0.09 NG/ML (ref 0–0.25)
PROT SERPL-MCNC: 6.9 G/DL (ref 6–8.5)
PROT UR QL STRIP: ABNORMAL
RBC # BLD AUTO: 3.52 10*6/MM3 (ref 3.77–5.28)
RBC # UR STRIP: ABNORMAL /HPF
REF LAB TEST METHOD: ABNORMAL
RHINOVIRUS RNA SPEC NAA+PROBE: NOT DETECTED
RSV RNA NPH QL NAA+NON-PROBE: NOT DETECTED
SARS-COV-2 RNA NPH QL NAA+NON-PROBE: NOT DETECTED
SODIUM SERPL-SCNC: 141 MMOL/L (ref 136–145)
SP GR UR STRIP: 1.02 (ref 1–1.03)
SQUAMOUS #/AREA URNS HPF: ABNORMAL /HPF
UROBILINOGEN UR QL STRIP: ABNORMAL
WBC # UR STRIP: ABNORMAL /HPF
WBC NRBC COR # BLD AUTO: 7.46 10*3/MM3 (ref 3.4–10.8)

## 2024-04-19 PROCEDURE — 71275 CT ANGIOGRAPHY CHEST: CPT

## 2024-04-19 PROCEDURE — 82948 REAGENT STRIP/BLOOD GLUCOSE: CPT

## 2024-04-19 PROCEDURE — 83880 ASSAY OF NATRIURETIC PEPTIDE: CPT | Performed by: EMERGENCY MEDICINE

## 2024-04-19 PROCEDURE — 87186 SC STD MICRODIL/AGAR DIL: CPT | Performed by: EMERGENCY MEDICINE

## 2024-04-19 PROCEDURE — 85379 FIBRIN DEGRADATION QUANT: CPT | Performed by: EMERGENCY MEDICINE

## 2024-04-19 PROCEDURE — 87040 BLOOD CULTURE FOR BACTERIA: CPT | Performed by: EMERGENCY MEDICINE

## 2024-04-19 PROCEDURE — 83605 ASSAY OF LACTIC ACID: CPT | Performed by: EMERGENCY MEDICINE

## 2024-04-19 PROCEDURE — 87086 URINE CULTURE/COLONY COUNT: CPT | Performed by: EMERGENCY MEDICINE

## 2024-04-19 PROCEDURE — 80053 COMPREHEN METABOLIC PANEL: CPT | Performed by: EMERGENCY MEDICINE

## 2024-04-19 PROCEDURE — 83690 ASSAY OF LIPASE: CPT | Performed by: EMERGENCY MEDICINE

## 2024-04-19 PROCEDURE — 93971 EXTREMITY STUDY: CPT | Performed by: SURGERY

## 2024-04-19 PROCEDURE — 87077 CULTURE AEROBIC IDENTIFY: CPT | Performed by: EMERGENCY MEDICINE

## 2024-04-19 PROCEDURE — 25010000002 CEFEPIME PER 500 MG: Performed by: FAMILY MEDICINE

## 2024-04-19 PROCEDURE — 0202U NFCT DS 22 TRGT SARS-COV-2: CPT | Performed by: EMERGENCY MEDICINE

## 2024-04-19 PROCEDURE — 99285 EMERGENCY DEPT VISIT HI MDM: CPT

## 2024-04-19 PROCEDURE — P9612 CATHETERIZE FOR URINE SPEC: HCPCS

## 2024-04-19 PROCEDURE — 84145 PROCALCITONIN (PCT): CPT | Performed by: EMERGENCY MEDICINE

## 2024-04-19 PROCEDURE — 25010000002 VANCOMYCIN 10 G RECONSTITUTED SOLUTION: Performed by: EMERGENCY MEDICINE

## 2024-04-19 PROCEDURE — 25510000001 IOPAMIDOL PER 1 ML: Performed by: EMERGENCY MEDICINE

## 2024-04-19 PROCEDURE — 71045 X-RAY EXAM CHEST 1 VIEW: CPT

## 2024-04-19 PROCEDURE — 83735 ASSAY OF MAGNESIUM: CPT | Performed by: EMERGENCY MEDICINE

## 2024-04-19 PROCEDURE — 85025 COMPLETE CBC W/AUTO DIFF WBC: CPT | Performed by: EMERGENCY MEDICINE

## 2024-04-19 PROCEDURE — 81001 URINALYSIS AUTO W/SCOPE: CPT | Performed by: EMERGENCY MEDICINE

## 2024-04-19 PROCEDURE — 87154 CUL TYP ID BLD PTHGN 6+ TRGT: CPT | Performed by: EMERGENCY MEDICINE

## 2024-04-19 PROCEDURE — 25010000002 CEFEPIME PER 500 MG: Performed by: EMERGENCY MEDICINE

## 2024-04-19 PROCEDURE — 36415 COLL VENOUS BLD VENIPUNCTURE: CPT

## 2024-04-19 PROCEDURE — 25810000003 SODIUM CHLORIDE 0.9 % SOLUTION: Performed by: EMERGENCY MEDICINE

## 2024-04-19 PROCEDURE — 93971 EXTREMITY STUDY: CPT

## 2024-04-19 RX ORDER — BISACODYL 10 MG
10 SUPPOSITORY, RECTAL RECTAL DAILY PRN
Status: DISCONTINUED | OUTPATIENT
Start: 2024-04-19 | End: 2024-04-23 | Stop reason: HOSPADM

## 2024-04-19 RX ORDER — SODIUM CHLORIDE 0.9 % (FLUSH) 0.9 %
10 SYRINGE (ML) INJECTION AS NEEDED
Status: DISCONTINUED | OUTPATIENT
Start: 2024-04-19 | End: 2024-04-23 | Stop reason: HOSPADM

## 2024-04-19 RX ORDER — CARVEDILOL 25 MG/1
25 TABLET ORAL 2 TIMES DAILY WITH MEALS
Status: DISCONTINUED | OUTPATIENT
Start: 2024-04-19 | End: 2024-04-23 | Stop reason: HOSPADM

## 2024-04-19 RX ORDER — AMOXICILLIN 250 MG
2 CAPSULE ORAL 2 TIMES DAILY PRN
Status: DISCONTINUED | OUTPATIENT
Start: 2024-04-19 | End: 2024-04-23 | Stop reason: HOSPADM

## 2024-04-19 RX ORDER — MULTIPLE VITAMINS W/ MINERALS TAB 9MG-400MCG
1 TAB ORAL DAILY
Status: DISCONTINUED | OUTPATIENT
Start: 2024-04-20 | End: 2024-04-23 | Stop reason: HOSPADM

## 2024-04-19 RX ORDER — ACETAMINOPHEN 325 MG/1
650 TABLET ORAL EVERY 4 HOURS PRN
Status: DISCONTINUED | OUTPATIENT
Start: 2024-04-19 | End: 2024-04-23 | Stop reason: HOSPADM

## 2024-04-19 RX ORDER — LEFLUNOMIDE 20 MG/1
20 TABLET ORAL DAILY
Status: DISCONTINUED | OUTPATIENT
Start: 2024-04-19 | End: 2024-04-23 | Stop reason: HOSPADM

## 2024-04-19 RX ORDER — FERROUS SULFATE 325(65) MG
325 TABLET ORAL
Status: DISCONTINUED | OUTPATIENT
Start: 2024-04-19 | End: 2024-04-23 | Stop reason: HOSPADM

## 2024-04-19 RX ORDER — ACETAMINOPHEN 325 MG/1
650 TABLET ORAL ONCE
Status: COMPLETED | OUTPATIENT
Start: 2024-04-19 | End: 2024-04-19

## 2024-04-19 RX ORDER — POLYETHYLENE GLYCOL 3350 17 G/17G
17 POWDER, FOR SOLUTION ORAL DAILY PRN
Status: DISCONTINUED | OUTPATIENT
Start: 2024-04-19 | End: 2024-04-23 | Stop reason: HOSPADM

## 2024-04-19 RX ORDER — FOLIC ACID 1 MG/1
1000 TABLET ORAL DAILY
Status: DISCONTINUED | OUTPATIENT
Start: 2024-04-19 | End: 2024-04-23 | Stop reason: HOSPADM

## 2024-04-19 RX ORDER — VALACYCLOVIR HYDROCHLORIDE 500 MG/1
500 TABLET, FILM COATED ORAL DAILY
Qty: 30 TABLET | Refills: 0 | Status: DISCONTINUED | OUTPATIENT
Start: 2024-04-20 | End: 2024-04-23 | Stop reason: HOSPADM

## 2024-04-19 RX ORDER — POTASSIUM CHLORIDE 20 MEQ/1
40 TABLET, EXTENDED RELEASE ORAL ONCE
Status: COMPLETED | OUTPATIENT
Start: 2024-04-19 | End: 2024-04-19

## 2024-04-19 RX ORDER — LEVOTHYROXINE SODIUM 0.07 MG/1
75 TABLET ORAL
Status: DISCONTINUED | OUTPATIENT
Start: 2024-04-19 | End: 2024-04-22

## 2024-04-19 RX ORDER — ASPIRIN 81 MG/1
81 TABLET ORAL DAILY
Status: DISCONTINUED | OUTPATIENT
Start: 2024-04-19 | End: 2024-04-23 | Stop reason: HOSPADM

## 2024-04-19 RX ORDER — NITROGLYCERIN 0.4 MG/1
0.4 TABLET SUBLINGUAL
Status: DISCONTINUED | OUTPATIENT
Start: 2024-04-19 | End: 2024-04-23 | Stop reason: HOSPADM

## 2024-04-19 RX ORDER — VANCOMYCIN 2 GRAM/500 ML IN 0.9 % SODIUM CHLORIDE INTRAVENOUS
20 ONCE
Qty: 500 ML | Refills: 0 | Status: COMPLETED | OUTPATIENT
Start: 2024-04-19 | End: 2024-04-19

## 2024-04-19 RX ORDER — SUCRALFATE 1 G/1
1 TABLET ORAL
Status: DISCONTINUED | OUTPATIENT
Start: 2024-04-19 | End: 2024-04-22

## 2024-04-19 RX ORDER — LOSARTAN POTASSIUM 50 MG/1
50 TABLET ORAL DAILY
Status: DISCONTINUED | OUTPATIENT
Start: 2024-04-19 | End: 2024-04-23 | Stop reason: HOSPADM

## 2024-04-19 RX ORDER — TRAMADOL HYDROCHLORIDE 50 MG/1
50 TABLET ORAL EVERY 6 HOURS PRN
Status: DISCONTINUED | OUTPATIENT
Start: 2024-04-19 | End: 2024-04-23 | Stop reason: HOSPADM

## 2024-04-19 RX ORDER — ONDANSETRON 2 MG/ML
4 INJECTION INTRAMUSCULAR; INTRAVENOUS EVERY 6 HOURS PRN
Status: DISCONTINUED | OUTPATIENT
Start: 2024-04-19 | End: 2024-04-23 | Stop reason: HOSPADM

## 2024-04-19 RX ORDER — FLUTICASONE PROPIONATE 50 MCG
1 SPRAY, SUSPENSION (ML) NASAL DAILY
Status: DISCONTINUED | OUTPATIENT
Start: 2024-04-20 | End: 2024-04-22

## 2024-04-19 RX ORDER — ISOSORBIDE MONONITRATE 60 MG/1
60 TABLET, EXTENDED RELEASE ORAL EVERY MORNING
Status: DISCONTINUED | OUTPATIENT
Start: 2024-04-19 | End: 2024-04-23 | Stop reason: HOSPADM

## 2024-04-19 RX ORDER — BISACODYL 5 MG/1
5 TABLET, DELAYED RELEASE ORAL DAILY PRN
Status: DISCONTINUED | OUTPATIENT
Start: 2024-04-19 | End: 2024-04-23 | Stop reason: HOSPADM

## 2024-04-19 RX ORDER — PREDNISONE 5 MG/1
5 TABLET ORAL DAILY
Status: DISCONTINUED | OUTPATIENT
Start: 2024-04-19 | End: 2024-04-23 | Stop reason: HOSPADM

## 2024-04-19 RX ORDER — ACETAMINOPHEN 325 MG/1
650 TABLET ORAL EVERY 6 HOURS PRN
Status: DISCONTINUED | OUTPATIENT
Start: 2024-04-19 | End: 2024-04-19 | Stop reason: SDUPTHER

## 2024-04-19 RX ORDER — DULOXETIN HYDROCHLORIDE 30 MG/1
60 CAPSULE, DELAYED RELEASE ORAL DAILY
Status: DISCONTINUED | OUTPATIENT
Start: 2024-04-19 | End: 2024-04-23 | Stop reason: HOSPADM

## 2024-04-19 RX ORDER — FUROSEMIDE 20 MG/1
40 TABLET ORAL DAILY
Status: DISCONTINUED | OUTPATIENT
Start: 2024-04-19 | End: 2024-04-23 | Stop reason: HOSPADM

## 2024-04-19 RX ORDER — LIDOCAINE 4 G/G
1 PATCH TOPICAL DAILY PRN
Status: DISCONTINUED | OUTPATIENT
Start: 2024-04-19 | End: 2024-04-23 | Stop reason: HOSPADM

## 2024-04-19 RX ADMIN — APIXABAN 5 MG: 5 TABLET, FILM COATED ORAL at 15:33

## 2024-04-19 RX ADMIN — SUCRALFATE 1 G: 1 TABLET ORAL at 20:22

## 2024-04-19 RX ADMIN — POTASSIUM CHLORIDE 40 MEQ: 1500 TABLET, EXTENDED RELEASE ORAL at 10:19

## 2024-04-19 RX ADMIN — IOPAMIDOL 100 ML: 755 INJECTION, SOLUTION INTRAVENOUS at 10:01

## 2024-04-19 RX ADMIN — TRAMADOL HYDROCHLORIDE 50 MG: 50 TABLET, COATED ORAL at 20:22

## 2024-04-19 RX ADMIN — LOSARTAN POTASSIUM 50 MG: 50 TABLET, FILM COATED ORAL at 15:27

## 2024-04-19 RX ADMIN — FUROSEMIDE 40 MG: 40 TABLET ORAL at 15:41

## 2024-04-19 RX ADMIN — ISOSORBIDE MONONITRATE 60 MG: 60 TABLET, EXTENDED RELEASE ORAL at 15:27

## 2024-04-19 RX ADMIN — SODIUM CHLORIDE 2000 MG: 9 INJECTION, SOLUTION INTRAVENOUS at 10:13

## 2024-04-19 RX ADMIN — ACETAMINOPHEN 650 MG: 325 TABLET, FILM COATED ORAL at 09:03

## 2024-04-19 RX ADMIN — LEFLUNOMIDE 20 MG: 20 TABLET ORAL at 15:40

## 2024-04-19 RX ADMIN — CEFEPIME 2000 MG: 2 INJECTION, POWDER, FOR SOLUTION INTRAVENOUS at 18:35

## 2024-04-19 RX ADMIN — CARVEDILOL 25 MG: 25 TABLET, FILM COATED ORAL at 18:35

## 2024-04-19 RX ADMIN — ASPIRIN 81 MG: 81 TABLET, COATED ORAL at 15:41

## 2024-04-19 RX ADMIN — CEFEPIME 2000 MG: 2 INJECTION, POWDER, FOR SOLUTION INTRAVENOUS at 09:38

## 2024-04-19 RX ADMIN — ACETAMINOPHEN 650 MG: 325 TABLET, FILM COATED ORAL at 15:33

## 2024-04-19 RX ADMIN — APIXABAN 5 MG: 5 TABLET, FILM COATED ORAL at 20:22

## 2024-04-19 RX ADMIN — DULOXETINE HYDROCHLORIDE 60 MG: 30 CAPSULE, DELAYED RELEASE ORAL at 15:28

## 2024-04-19 NOTE — PROGRESS NOTES
"Pharmacy Dosing Service  Antimicrobial Dosing  CEFEPIME    Assessment/Action/Plan:  Active Hospital Problems    Diagnosis  POA    **UTI (urinary tract infection) [N39.0]  Yes       Current order: Cefepime 1 g IV q8h    Infectious history reviewed.     Plan: adjust to Cefepime 2 g IV every 12 hours via extended infusion     Subjective:  Tawny Shin is a 68 y.o. female on Cefepime, day 1 of 5 of treatment.    Objective:  Ht: 170.2 cm (67\"); Wt: 99.9 kg (220 lb 4.8 oz)  Estimated Creatinine Clearance: 52.5 mL/min (A) (by C-G formula based on SCr of 1.21 mg/dL (H)).   Creatinine   Date Value Ref Range Status   04/19/2024 1.21 (H) 0.57 - 1.00 mg/dL Final   01/22/2024 1.14 (H) 0.57 - 1.00 mg/dL Final      Lab Results   Component Value Date    WBC 7.46 04/19/2024    WBC 4.80 01/22/2024      Baseline culture results:  Microbiology Results (last 10 days)       Procedure Component Value - Date/Time    Respiratory Panel PCR w/COVID-19(SARS-CoV-2) LUIS/NEYDA/JOSE/PAD/COR/SRI In-House, NP Swab in UTM/VTM, 2 HR TAT - Swab, Nasopharynx [[id number]]  (Normal) Collected: 04/19/24 0902    Lab Status: Final result Specimen: Swab from Nasopharynx Updated: 04/19/24 1028     ADENOVIRUS, PCR Not Detected     Coronavirus 229E Not Detected     Coronavirus HKU1 Not Detected     Coronavirus NL63 Not Detected     Coronavirus OC43 Not Detected     COVID19 Not Detected     Human Metapneumovirus Not Detected     Human Rhinovirus/Enterovirus Not Detected     Influenza A PCR Not Detected     Influenza B PCR Not Detected     Parainfluenza Virus 1 Not Detected     Parainfluenza Virus 2 Not Detected     Parainfluenza Virus 3 Not Detected     Parainfluenza Virus 4 Not Detected     RSV, PCR Not Detected     Bordetella pertussis pcr Not Detected     Bordetella parapertussis PCR Not Detected     Chlamydophila pneumoniae PCR Not Detected     Mycoplasma pneumo by PCR Not Detected    Narrative:      In the setting of a positive respiratory panel with a " viral infection PLUS a negative procalcitonin without other underlying concern for bacterial infection, consider observing off antibiotics or discontinuation of antibiotics and continue supportive care. If the respiratory panel is positive for atypical bacterial infection (Bordetella pertussis, Chlamydophila pneumoniae, or Mycoplasma pneumoniae), consider antibiotic de-escalation to target atypical bacterial infection.            Hair Ochoa, PharmD  04/19/24 13:39 CDT

## 2024-04-19 NOTE — ED PROVIDER NOTES
Subjective   History of Present Illness  Patient is a 68-year-old female with a history of hypertension and coronary artery disease who presents to the ER with a fever.  Patient has been running a fever for the last few days.  She also complains of nausea, generalized weakness, cough and intermittent shortness of air.  She recently traveled to [place].  Patient states she also has a wound on her right lower leg that has become very red.  She is concerned that it may be infected.  She denies any chest pain, abdominal pain, vomiting, diarrhea, urinary changes, focal neurologic changes.      Review of Systems   Constitutional:  Positive for fever.   HENT: Negative.     Eyes: Negative.    Respiratory:  Positive for cough and shortness of breath.    Cardiovascular: Negative.    Gastrointestinal:  Positive for nausea.   Endocrine: Negative.    Genitourinary: Negative.    Musculoskeletal: Negative.    Skin: Negative.    Allergic/Immunologic: Negative.    Neurological:  Positive for weakness.   Hematological: Negative.    Psychiatric/Behavioral: Negative.     All other systems reviewed and are negative.      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    Elevated cholesterol     Eustachian tube dysfunction     Heart disease     Herpes simplex     History of transfusion     Hyperlipidemia     Hypertension     Hypothyroidism 12/23/2020    Intrinsic asthma 12/23/2020    Knee dislocation     Labral tear of  "right hip joint     Laryngitis sicca     Laryngitis, chronic     Left carotid bruit 03/09/2016    MI (myocardial infarction)     Myalgia due to statin 06/25/2019    Open wound of right hip 09/14/2021    Osteomyelitis of right femur 07/06/2021    Otorrhea     Pacemaker 11/17/2016    Primary osteoarthritis of left knee 12/23/2020    Psoriasis vulgaris 12/23/2020    S/P coronary artery stent placement 03/09/2016    Sensorineural hearing loss     Seropositive rheumatoid arthritis of multiple sites 12/27/2019    Overview:  -myochrysine '93-'96 -methotrexate '96--->11/98;r/s  restarted 2/99--> 8/14 (anemia) -sulfasalazine- not effective -penicillamine 6/98-->10/98; no effect -leflunomide 11/98--> - Humira '13-->didn't take - Enbrel 12/14-->3/15- no effect!   Last Assessment & Plan:  - \"aching all over\" because she had to be off her anti-rheumatic drugs for 2 weeks in preparation for her R knee surgery - he    Sick sinus syndrome 12/27/2019    Sjogren's disease     Spondylolisthesis of lumbar region 01/17/2018    Syncope, recurrent 02/08/2021    Urinary tract infection        Allergies   Allergen Reactions    Atorvastatin Other (See Comments)     LEG CRAMPS      Amoxicillin Rash    Escitalopram Rash    Nabumetone Rash    Niacin Er Rash    Penicillin G Rash    Penicillins Rash    Simvastatin Rash       Past Surgical History:   Procedure Laterality Date    A-V CARDIAC PACEMAKER INSERTION  2016    ATRIAL CARDIAC PACEMAKER INSERTION      CARDIAC CATHETERIZATION      CATARACT EXTRACTION      CERVICAL CORPECTOMY N/A 3/3/2021    Procedure: CERVICAL 6 CORPECTOMY WITH TITANIUM CAGE WITH NEURO MONITORING;  Surgeon: Bandar Shea MD;  Location: [facility];  Service: Neurosurgery;  Laterality: N/A;    COLONOSCOPY  11/08/2011    One fold in the ascending colon which showed ulcer otherwise normal exam    COLONOSCOPY  11/12/2004    Normal exam repeat in five years    CORONARY ANGIOPLASTY WITH STENT PLACEMENT      X 2; 2013 & " 2014    ENDOSCOPY  07/10/2014    Normal exam    FLAP LEG Right 9/14/2021    Procedure: RIGHT GLUTEAL FASCIOCUTANEOUS ADVANCEMENT FLAP AND RIGHT TENSOR FASCIAL JESSICA FLAP;  Surgeon: Amadeo Turner MD;  Location:  [facility];  Service: Plastics;  Laterality: Right;    HIP ABDUCTION TENOTOMY BILATERAL Right 1/14/2021    Procedure: RIGHT HIP GLUTEUS MEDLUS / MINIMUS REPAIR, POSSIBLE ACHILLES ALLOGRAFT;  Surgeon: Nino Carlson MD;  Location:  [facility];  Service: Orthopedics;  Laterality: Right;    INCISION AND DRAINAGE ABSCESS Right 6/4/2022    Procedure: INCISION AND DRAINAGE ABSCESS right hip;  Surgeon: Magda Salcido MD;  Location:  [facility];  Service: General;  Laterality: Right;    INCISION AND DRAINAGE ABSCESS Right 6/10/2022    Procedure: RIGHT HIP INCISION AND DRAINAGE. MD NEEDS 3L VANC IRRIGATION, CURRETTES, DAICANS, KERLEX ROLLS;  Surgeon: Amadeo Turner MD;  Location:  [facility];  Service: Plastics;  Laterality: Right;    INCISION AND DRAINAGE HIP Right 2/9/2021    Procedure: HIP INCISION AND DRAINAGE;  Surgeon: Nino Carlson MD;  Location:  [facility];  Service: Orthopedics;  Laterality: Right;    INCISION AND DRAINAGE LEG Right 10/24/2021    Procedure: INCISION AND DRAINAGE LOWER EXTREMITY;  Surgeon: Amadeo Turner MD;  Location:  [facility];  Service: Plastics;  Laterality: Right;    INCISION AND DRAINAGE OF WOUND Right 7/8/2021    Procedure: INCISION AND DRAINAGE WOUND RIGHT HIP;  Surgeon: James Huntley MD;  Location:  [facility];  Service: Orthopedics;  Laterality: Right;    JOINT REPLACEMENT      KYPHOPLASTY WITH BIOPSY Bilateral 10/26/2021    Procedure: THOARCIC 12 KYPHOPLASTY WITH BIOPSY;  Surgeon: Bandar Shea MD;  Location:  [facility];  Service: Neurosurgery;  Laterality: Bilateral;    LEG DEBRIDEMENT Right 9/14/2021    Procedure: DEBRIDEMENT OF RIGHT HIP WOUND, RIGHT GLUTEAL FASCIOCUTANEOUS ADVANCEMENT FLAP AND RIGHT TENSOR FASCIAL JESSICA FLAP;  Surgeon: Amadeo Turner MD;   Location:  [facility];  Service: Plastics;  Laterality: Right;    LUMBAR DISCECTOMY Right 3/23/2021    Procedure: LUMBAR DISCECTOMY MICRO, Lumbar 1/2 right;  Surgeon: Bandar Shea MD;  Location:  [facility];  Service: Neurosurgery;  Laterality: Right;    LUMBAR FUSION N/A 1/19/2018    Procedure: L3-4,L4-5 DECOMPRESSION, POSTERIOR SPINAL FUSION WITH INSTRUMENTATION;  Surgeon: Fortino Oropeza MD;  Location:  [facility];  Service:     LUMBAR LAMINECTOMY WITH FUSION Left 1/17/2018    Procedure: LEFT L3-4 L4-5 LATERAL LUMBAR INTERBODY FUSION;  Surgeon: Fortino Oropeza MD;  Location:  [facility];  Service:     MYRINGOTOMY W/ TUBES  09/04/2014    TUBES NO LONGER IN PLACE    OTHER SURGICAL HISTORY      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 so hardware was removed and spacers were placed    REPLACEMENT TOTAL KNEE Right        Family History   Problem Relation Age of Onset    Cancer Mother         Lung cancer    Heart disease Father         Doied of heart. Attack       Social History     Socioeconomic History    Marital status:    Tobacco Use    Smoking status: Never     Passive exposure: Never    Smokeless tobacco: Never   Vaping Use    Vaping status: Never Used   Substance and Sexual Activity    Alcohol use: No    Drug use: Never    Sexual activity: Defer     Birth control/protection: Abstinence           Objective   Physical Exam  Vitals and nursing note reviewed.   Constitutional:       Appearance: She is well-developed.   HENT:      Head: Normocephalic and atraumatic.   Eyes:      Conjunctiva/sclera: Conjunctivae normal.      Pupils: Pupils are equal, round, and reactive to light.   Cardiovascular:      Rate and Rhythm: Regular rhythm. Tachycardia present.      Heart sounds: Normal heart sounds.   Pulmonary:      Effort: Pulmonary effort is normal.      Breath sounds: Normal breath sounds.   Abdominal:      Palpations: Abdomen is soft.      Tenderness: There is no abdominal tenderness.   Musculoskeletal:          General: No deformity. Normal range of motion.      Cervical back: Normal range of motion.   Skin:     General: Skin is warm.      Comments: Healing wound to distal right shin with surrounding erythema, no signs of abscess   Neurological:      General: No focal deficit present.      Mental Status: She is alert and oriented to person, place, and time.   Psychiatric:         Behavior: Behavior normal.         Procedures           ED Course                               Lab Results (last 24 hours)       Procedure Component Value Units Date/Time    CBC & Differential [[id number]]  (Abnormal) Collected: 04/19/24 0856    Specimen: Blood Updated: 04/19/24 0909    Narrative:      The following orders were created for panel order CBC & Differential.  Procedure                               Abnormality         Status                     ---------                               -----------         ------                     CBC Auto Differential[[id number]]        Abnormal            Final result                 Please view results for these tests on the individual orders.    Comprehensive Metabolic Panel [[id number]]  (Abnormal) Collected: 04/19/24 0856    Specimen: Blood Updated: 04/19/24 0931     Glucose 131 mg/dL      BUN 23 mg/dL      Creatinine 1.21 mg/dL      Sodium 141 mmol/L      Potassium 2.9 mmol/L      Chloride 101 mmol/L      CO2 30.0 mmol/L      Calcium 9.5 mg/dL      Total Protein 6.9 g/dL      Albumin 3.3 g/dL      ALT (SGPT) 25 U/L      AST (SGOT) 23 U/L      Alkaline Phosphatase 70 U/L      Total Bilirubin 0.4 mg/dL      Globulin 3.6 gm/dL      A/G Ratio 0.9 g/dL      BUN/Creatinine Ratio 19.0     Anion Gap 10.0 mmol/L      eGFR 48.3 mL/min/1.73     Narrative:      GFR Normal >60  Chronic Kidney Disease <60  Kidney Failure <15      BNP [[id number]]  (Abnormal) Collected: 04/19/24 0856    Specimen: Blood Updated: 04/19/24 0928     proBNP 930.2 pg/mL     Narrative:      This assay is used as an aid in the diagnosis  "of individuals suspected of having heart failure. It can be used as an aid in the diagnosis of acute decompensated heart failure (ADHF) in patients presenting with signs and symptoms of ADHF to the emergency department (ED). In addition, NT-proBNP of <300 pg/mL indicates ADHF is not likely.    Age Range Result Interpretation  NT-proBNP Concentration (pg/mL:      <50             Positive            >450                   Gray                 300-450                    Negative             <300    50-75           Positive            >900                  Gray                300-900                  Negative            <300      >75             Positive            >1800                  Gray                300-1800                  Negative            <300    Lipase [[id number]]  (Normal) Collected: 04/19/24 0856    Specimen: Blood Updated: 04/19/24 0926     Lipase 16 U/L     Magnesium [[id number]]  (Normal) Collected: 04/19/24 0856    Specimen: Blood Updated: 04/19/24 0931     Magnesium 1.6 mg/dL     Lactic Acid, Plasma [[id number]]  (Normal) Collected: 04/19/24 0856    Specimen: Blood Updated: 04/19/24 0922     Lactate 1.6 mmol/L     Procalcitonin [[id number]]  (Normal) Collected: 04/19/24 0856    Specimen: Blood Updated: 04/19/24 0935     Procalcitonin 0.09 ng/mL     Narrative:      As a Marker for Sepsis (Non-Neonates):    1. <0.5 ng/mL represents a low risk of severe sepsis and/or septic shock.  2. >2 ng/mL represents a high risk of severe sepsis and/or septic shock.    As a Marker for Lower Respiratory Tract Infections that require antibiotic therapy:    PCT on Admission    Antibiotic Therapy       6-12 Hrs later    >0.5                Strongly Recommended  >0.25 - <0.5        Recommended   0.1 - 0.25          Discouraged              Remeasure/reassess PCT  <0.1                Strongly Discouraged     Remeasure/reassess PCT    As 28 day mortality risk marker: \"Change in Procalcitonin Result\" (>80% or <=80%) if Day 0 " (or Day 1) and Day 4 values are available. Refer to http://www.[facility]-pct-calculator.com    Change in PCT <=80%  A decrease of PCT levels below or equal to 80% defines a positive change in PCT test result representing a higher risk for 28-day all-cause mortality of patients diagnosed with severe sepsis for septic shock.    Change in PCT >80%  A decrease of PCT levels of more than 80% defines a negative change in PCT result representing a lower risk for 28-day all-cause mortality of patients diagnosed with severe sepsis or septic shock.       Blood Culture - Blood, Arm, Right [[id number]] Collected: 04/19/24 0856    Specimen: Blood from Arm, Right Updated: 04/19/24 0917    CBC Auto Differential [[id number]]  (Abnormal) Collected: 04/19/24 0856    Specimen: Blood Updated: 04/19/24 0909     WBC 7.46 10*3/mm3      RBC 3.52 10*6/mm3      Hemoglobin 10.6 g/dL      Hematocrit 33.9 %      MCV 96.3 fL      MCH 30.1 pg      MCHC 31.3 g/dL      RDW 16.7 %      RDW-SD 58.0 fl      MPV 11.0 fL      Platelets 163 10*3/mm3      Neutrophil % 69.7 %      Lymphocyte % 16.5 %      Monocyte % 11.7 %      Eosinophil % 0.8 %      Basophil % 0.4 %      Immature Grans % 0.9 %      Neutrophils, Absolute 5.20 10*3/mm3      Lymphocytes, Absolute 1.23 10*3/mm3      Monocytes, Absolute 0.87 10*3/mm3      Eosinophils, Absolute 0.06 10*3/mm3      Basophils, Absolute 0.03 10*3/mm3      Immature Grans, Absolute 0.07 10*3/mm3      nRBC 0.3 /100 WBC     D-dimer, Quantitative [[id number]]  (Abnormal) Collected: 04/19/24 0856    Specimen: Blood Updated: 04/19/24 0919     D-Dimer, Quantitative 1.63 MCGFEU/mL     Narrative:      According to the assay 's published package insert, a normal (<0.50 MCGFEU/mL) D-dimer result in conjunction with a non-high clinical probability assessment, excludes deep vein thrombosis (DVT) and pulmonary embolism (PE) with high sensitivity.    D-dimer values increase with age and this can make VTE exclusion of an  "older population difficult. To address this, the [employer], based on best available evidence and recent guidelines, recommends that clinicians use age-adjusted D-dimer thresholds in patients greater than 50 years of age with: a) a low probability of PE who do not meet all Pulmonary Embolism Rule Out Criteria, or b) in those with intermediate probability of PE.   The formula for an age-adjusted D-dimer cut-off is \"age/100\".  For example, a 60 year old patient would have an age-adjusted cut-off of 0.60 MCGFEU/mL and an 80 year old 0.80 MCGFEU/mL.    Respiratory Panel PCR w/COVID-19(SARS-CoV-2) [facility] In-House, NP Swab in [facility], 2 HR TAT - Swab, Nasopharynx [[id number]]  (Normal) Collected: 04/19/24 0902    Specimen: Swab from Nasopharynx Updated: 04/19/24 1028     ADENOVIRUS, PCR Not Detected     Coronavirus 229E Not Detected     Coronavirus HKU1 Not Detected     Coronavirus NL63 Not Detected     Coronavirus OC43 Not Detected     COVID19 Not Detected     Human Metapneumovirus Not Detected     Human Rhinovirus/Enterovirus Not Detected     Influenza A PCR Not Detected     Influenza B PCR Not Detected     Parainfluenza Virus 1 Not Detected     Parainfluenza Virus 2 Not Detected     Parainfluenza Virus 3 Not Detected     Parainfluenza Virus 4 Not Detected     RSV, PCR Not Detected     Bordetella pertussis pcr Not Detected     Bordetella parapertussis PCR Not Detected     Chlamydophila pneumoniae PCR Not Detected     Mycoplasma pneumo by PCR Not Detected    Narrative:      In the setting of a positive respiratory panel with a viral infection PLUS a negative procalcitonin without other underlying concern for bacterial infection, consider observing off antibiotics or discontinuation of antibiotics and continue supportive care. If the respiratory panel is positive for atypical bacterial infection (Bordetella pertussis, Chlamydophila pneumoniae, or Mycoplasma pneumoniae), consider " antibiotic de-escalation to target atypical bacterial infection.    Blood Culture - Blood, Hand, Left [[id number]] Collected: 04/19/24 0909    Specimen: Blood from Hand, Left Updated: 04/19/24 0919    Urinalysis With Culture If Indicated - Straight Cath [[id number]]  (Abnormal) Collected: 04/19/24 0932    Specimen: Urine from Straight Cath Updated: 04/19/24 0959     Color, UA Yellow     Appearance, UA Turbid     pH, UA 7.0     Specific Gravity, UA 1.020     Glucose, UA Negative     Ketones, UA Negative     Bilirubin, UA Negative     Blood, UA Moderate (2+)     Protein, UA 30 mg/dL (1+)     Leuk Esterase, UA Large (3+)     Nitrite, UA Positive     Urobilinogen, UA 0.2 E.U./dL    Narrative:      In absence of clinical symptoms, the presence of pyuria, bacteria, and/or nitrites on the urinalysis result does not correlate with infection.    Urinalysis, Microscopic Only - Straight Cath [[id number]]  (Abnormal) Collected: 04/19/24 0932    Specimen: Urine from Straight Cath Updated: 04/19/24 0959     RBC, UA 0-2 /HPF      WBC, UA Too Numerous to Count /HPF      Bacteria, UA 3+ /HPF      Squamous Epithelial Cells, UA 0-2 /HPF      Hyaline Casts, UA None Seen /LPF      Amorphous Crystals, UA Large/3+ /HPF      Methodology Manual Light Microscopy    Urine Culture - Urine, Straight Cath [[id number]] Collected: 04/19/24 0932    Specimen: Urine from Straight Cath Updated: 04/19/24 0959           CT Angiogram Chest   Final Result   1. No evidence of pulmonary embolism. No aortic aneurysm or dissection.   2. Severe atheromatous change of coronary arteries.   3. The lungs are poorly expanded but unremarkable.   4. Significantly dilated gallbladder with a radiopaque calculus in the   neck of the gallbladder. No finding to suggest cholecystitis.   5. Left renal calculi. Details given above.   6. Compression fracture and kyphoplasty of vertebra T12.                               This report was signed and finalized on 4/19/2024 10:27 AM  by Dr. Jose Patton MD.          XR Chest 1 View   Final Result   1.  Stable chest exam without acute process. No acute infiltrate.       This report was signed and finalized on 4/19/2024 9:17 AM by Dr Riki David.          US Venous Doppler Lower Extremity Right (duplex)    (Results Pending)                Medical Decision Making  Patient is a 68-year-old female with a history of hypertension and coronary artery disease who presents to the ER with a fever.  Patient has been running a fever for the last few days.  She also complains of nausea, generalized weakness, cough and intermittent shortness of air.  She recently traveled to [place].  Patient states she also has a wound on her right lower leg that has become very red.  She is concerned that it may be infected.  She denies any chest pain, abdominal pain, vomiting, diarrhea, urinary changes, focal neurologic changes.    Differential diagnosis: Cellulitis, pneumonia, viral syndrome, UTI    Cultures were obtained.  Patient given cefepime and vancomycin.  She arrived hypoxic.  She was placed on nasal cannula and improving.  Patient was also given Tylenol for fever.  Labs showed a mildly elevated creatinine at baseline for the patient.  Labs also showed a mildly elevated BNP and D-dimer.  Labs also showed hypokalemia.  Potassium was replaced by mouth.  Urinalysis was consistent with a UTI.  Ultrasound of the right lower extremity was negative for DVT.  Chest x-ray showed no acute findings.  CTA of the chest showed no evidence of pulmonary embolism, aneurysm or dissection.  Patient had no evidence of pneumonia.  She did have a significantly dilated gallbladder with stones in the neck of the gallbladder but no evidence of cholecystitis.  Patient also had some left renal calculi.  I think the patient's fever is most likely coming from her UTI.  She does have a small amount of cellulitis to the right leg as well which could also be contributing to the  fever.  I discussed the case with Dr. Julian with the hospitalist service and patient was admitted to her team for further treatment.      Problems Addressed:  Acute UTI: complicated acute illness or injury  Calculus of gallbladder and bile duct without cholecystitis or obstruction: complicated acute illness or injury  Cellulitis of right leg: complicated acute illness or injury  Chronic renal impairment, unspecified CKD stage: chronic illness or injury  Fever, unspecified fever cause: complicated acute illness or injury  Hypokalemia: complicated acute illness or injury    Amount and/or Complexity of Data Reviewed  Labs: ordered. Decision-making details documented in ED Course.  Radiology: ordered. Decision-making details documented in ED Course.  Discussion of management or test interpretation with external provider(s): Dr. Julian with the hospitalist group    Risk  OTC drugs.  Prescription drug management.  Decision regarding hospitalization.        Final diagnoses:   Acute UTI   Fever, unspecified fever cause   Chronic renal impairment, unspecified CKD stage   Hypokalemia   Cellulitis of right leg   Calculus of gallbladder without cholecystitis without obstruction       ED Disposition  ED Disposition       ED Disposition   Decision to Admit    Condition   --    Comment   Level of Care: Telemetry [5]   Diagnosis: UTI (urinary tract infection) [416251]   Admitting Physician: GEMMA JULIAN [[id number]]   Attending Physician: GEMMA JULIAN [[id number]]   Certification: I Certify That Inpatient Hospital Services Are Medically Necessary For Greater Than 2 Midnights                 No follow-up provider specified.       Medication List      No changes were made to your prescriptions during this visit.            Paulina Hernandez MD  04/19/24 [id number]

## 2024-04-19 NOTE — H&P
[facility] Medicine Services  HISTORY AND PHYSICAL    Date of Admission: 4/19/2024  Primary Care Physician: Moises Oliveira MD    Subjective   Primary Historian: Patient    Chief Complaint: Generalized weakness, nausea, vomiting x 1.    History of Present Illness  Patient started having symptoms on Monday.  She had some nausea and episode of vomiting and felt weak.  She had a low-grade fever.  She felt better the next 2 to 3 days and then symptoms returned.  She presented to the ER for evaluation.  She is not complaining of any dysuria at this time.    Patient is living at home.    Review of Systems   Otherwise complete ROS reviewed and negative except as mentioned in the HPI.    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    Elevated cholesterol     Eustachian tube dysfunction     Heart disease     Herpes simplex     History of transfusion     Hyperlipidemia     Hypertension     Hypothyroidism 12/23/2020    Intrinsic asthma 12/23/2020    Knee dislocation     Labral tear of right hip joint     Laryngitis sicca     Laryngitis, chronic     Left carotid bruit 03/09/2016    MI (myocardial infarction)     Myalgia due to statin 06/25/2019    Open wound of right hip 09/14/2021    Osteomyelitis of right femur 07/06/2021    Otorrhea     Pacemaker 11/17/2016    Primary osteoarthritis of left knee 12/23/2020    Psoriasis vulgaris 12/23/2020    S/P  "coronary artery stent placement 03/09/2016    Sensorineural hearing loss     Seropositive rheumatoid arthritis of multiple sites 12/27/2019    Overview:  -myochrysine '93-'96 -methotrexate '96--->11/98;r/s  restarted 2/99--> 8/14 (anemia) -sulfasalazine- not effective -penicillamine 6/98-->10/98; no effect -leflunomide 11/98--> - Humira '13-->didn't take - Enbrel 12/14-->3/15- no effect!   Last Assessment & Plan:  - \"aching all over\" because she had to be off her anti-rheumatic drugs for 2 weeks in preparation for her R knee surgery - he    Sick sinus syndrome 12/27/2019    Sjogren's disease     Spondylolisthesis of lumbar region 01/17/2018    Syncope, recurrent 02/08/2021    Urinary tract infection      Past Surgical History:  Past Surgical History:   Procedure Laterality Date    A-V CARDIAC PACEMAKER INSERTION  2016    ATRIAL CARDIAC PACEMAKER INSERTION      CARDIAC CATHETERIZATION      CATARACT EXTRACTION      CERVICAL CORPECTOMY N/A 3/3/2021    Procedure: CERVICAL 6 CORPECTOMY WITH TITANIUM CAGE WITH NEURO MONITORING;  Surgeon: Bandar Shea MD;  Location: [facility] OR;  Service: Neurosurgery;  Laterality: N/A;    COLONOSCOPY  11/08/2011    One fold in the ascending colon which showed ulcer otherwise normal exam    COLONOSCOPY  11/12/2004    Normal exam repeat in five years    CORONARY ANGIOPLASTY WITH STENT PLACEMENT      X 2; 2013 & 2014    ENDOSCOPY  07/10/2014    Normal exam    FLAP LEG Right 9/14/2021    Procedure: RIGHT GLUTEAL FASCIOCUTANEOUS ADVANCEMENT FLAP AND RIGHT TENSOR FASCIAL JESSICA FLAP;  Surgeon: Amadeo Turner MD;  Location: [facility] OR;  Service: Plastics;  Laterality: Right;    HIP ABDUCTION TENOTOMY BILATERAL Right 1/14/2021    Procedure: RIGHT HIP GLUTEUS MEDLUS / MINIMUS REPAIR, POSSIBLE ACHILLES ALLOGRAFT;  Surgeon: Nino Carlson MD;  Location:  [facility] OR;  Service: Orthopedics;  Laterality: Right;    INCISION AND DRAINAGE ABSCESS Right 6/4/2022    Procedure: INCISION AND DRAINAGE " ABSCESS right hip;  Surgeon: Magda Salcido MD;  Location:  [facility] OR;  Service: General;  Laterality: Right;    INCISION AND DRAINAGE ABSCESS Right 6/10/2022    Procedure: RIGHT HIP INCISION AND DRAINAGE. MD NEEDS 3L VANC IRRIGATION, EDNA, DAICANS, KERLEX ROLLS;  Surgeon: Amadeo Turner MD;  Location:  [facility] OR;  Service: Plastics;  Laterality: Right;    INCISION AND DRAINAGE HIP Right 2/9/2021    Procedure: HIP INCISION AND DRAINAGE;  Surgeon: Nino Carlson MD;  Location:  [facility] OR;  Service: Orthopedics;  Laterality: Right;    INCISION AND DRAINAGE LEG Right 10/24/2021    Procedure: INCISION AND DRAINAGE LOWER EXTREMITY;  Surgeon: Amadeo Turner MD;  Location:  [facility] OR;  Service: Plastics;  Laterality: Right;    INCISION AND DRAINAGE OF WOUND Right 7/8/2021    Procedure: INCISION AND DRAINAGE WOUND RIGHT HIP;  Surgeon: James Huntley MD;  Location:  [facility] OR;  Service: Orthopedics;  Laterality: Right;    JOINT REPLACEMENT      KYPHOPLASTY WITH BIOPSY Bilateral 10/26/2021    Procedure: OARCIC 12 KYPHOPLASTY WITH BIOPSY;  Surgeon: Bandar Shea MD;  Location:  [facility] OR;  Service: Neurosurgery;  Laterality: Bilateral;    LEG DEBRIDEMENT Right 9/14/2021    Procedure: DEBRIDEMENT OF RIGHT HIP WOUND, RIGHT GLUTEAL FASCIOCUTANEOUS ADVANCEMENT FLAP AND RIGHT TENSOR FASCIAL JESSICA FLAP;  Surgeon: Amadeo Turner MD;  Location:  [facility] OR;  Service: Plastics;  Laterality: Right;    LUMBAR DISCECTOMY Right 3/23/2021    Procedure: LUMBAR DISCECTOMY MICRO, Lumbar 1/2 right;  Surgeon: Bandar Shea MD;  Location:  [facility] OR;  Service: Neurosurgery;  Laterality: Right;    LUMBAR FUSION N/A 1/19/2018    Procedure: L3-4,L4-5 DECOMPRESSION, POSTERIOR SPINAL FUSION WITH INSTRUMENTATION;  Surgeon: Fortino Oropeza MD;  Location:  [facility] OR;  Service:     LUMBAR LAMINECTOMY WITH FUSION Left 1/17/2018    Procedure: LEFT L3-4 L4-5 LATERAL LUMBAR INTERBODY FUSION;  Surgeon: Fortino Ndiaye  MD Sandip;  Location: [facility] OR;  Service:     MYRINGOTOMY W/ TUBES  09/04/2014    TUBES NO LONGER IN PLACE    OTHER SURGICAL HISTORY      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 so hardware was removed and spacers were placed    REPLACEMENT TOTAL KNEE Right      Social History:  reports that she has never smoked. She has never been exposed to tobacco smoke. She has never used smokeless tobacco. She reports that she does not drink alcohol and does not use drugs.    Family History: family history includes Cancer in her mother; Heart disease in her father.       Allergies:  Allergies   Allergen Reactions    Atorvastatin Other (See Comments)     LEG CRAMPS      Amoxicillin Rash    Escitalopram Rash    Nabumetone Rash    Niacin Er Rash    Penicillin G Rash    Penicillins Rash    Simvastatin Rash       Medications:  Prior to Admission medications    Medication Sig Start Date End Date Taking? Authorizing Provider   acetaminophen (TYLENOL) 325 MG tablet Take 2 tablets by mouth Every 6 (Six) Hours As Needed (mild pain). Indications: Fever, Pain    ProviderLeila MD   aspirin 81 MG EC tablet Take 1 tablet by mouth Daily. Indications: Disease involving Lipid Deposits in the Arteries    Provider, MD Leila   carvedilol (COREG) 25 MG tablet TAKE ONE TABLET BY MOUTH TWICE A DAY WITH MEALS 2/6/24   Moises Oliveira MD   Diclofenac Sodium (VOLTAREN) 1 % gel gel Apply 4 g topically to the appropriate area as directed 4 (Four) Times a Day As Needed. Indications: Joint Damage causing Pain and Loss of Function    ProviderLeila MD   DULoxetine (CYMBALTA) 60 MG capsule TAKE ONE CAPSULE BY MOUTH DAILY 1/29/24   Moises Oliveira MD   Eliquis 5 MG tablet tablet TAKE ONE TABLET BY MOUTH TWICE A DAY 2/14/24   Moises Oliveira MD   Evolocumab (REPATHA) solution auto-injector SureClick injection Inject 1 mL under the skin into the appropriate area as directed Every 14 (Fourteen) Days. 1/22/24   Ximena Vazquez APRN   ferrous  sulfate 324 (65 Fe) MG tablet delayed-release EC tablet TAKE ONE TABLET BY MOUTH DAILY WITH BREAKFAST 1/22/24   Moises Oliveira MD   fluticasone (FLONASE) 50 MCG/ACT nasal spray 1 spray into the nostril(s) as directed by provider Daily. 5/24/22   Caitie Olivera APRN   folic acid (FOLVITE) 1 MG tablet TAKE ONE TABLET BY MOUTH DAILY 1/29/24   Moises Oliveira MD   furosemide (LASIX) 40 MG tablet TAKE ONE TABLET BY MOUTH DAILY 2/6/24   Moises Oliveira MD   isosorbide mononitrate (IMDUR) 60 MG 24 hr tablet TAKE ONE TABLET BY MOUTH EVERY MORNING 1/29/24   Moises Oliveira MD   leflunomide (ARAVA) 20 MG tablet Take 1 tablet by mouth every night at bedtime. Indications: Rheumatoid Arthritis 2/6/23   Moises Oliveira MD   levothyroxine (SYNTHROID, LEVOTHROID) 75 MCG tablet TAKE ONE TABLET BY MOUTH DAILY 1/22/24   Moises Oliveira MD   lidocaine (Lidoderm) 5 % Place 1 patch on the skin as directed by provider Daily As Needed for Mild Pain. Remove & Discard patch within 12 hours or as directed by MD 4/9/24   Niki Palma APRN   losartan (COZAAR) 50 MG tablet TAKE 1 TABLET BY MOUTH DAILY 10/23/23   Moises Oliveira MD   magnesium hydroxide (MILK OF MAGNESIA) 400 MG/5ML suspension Take 30 mL by mouth Daily As Needed for Constipation. Indications: Constipation    ProviderLeila MD   Menthol-Zinc Oxide 0.45-20 % ointment Apply 1 application  topically 2 (Two) Times a Day. Apply to right buttock 1/9/23   Leila Daly MD   multivitamin with minerals tablet tablet Take 1 tablet by mouth Daily. 3/6/21   Taiwo Prado APRN   nitroglycerin (Nitrostat) 0.4 MG SL tablet Place 1 tablet under the tongue Every 5 (Five) Minutes As Needed for Chest Pain. Take no more than 3 doses in 15 minutes.  Patient taking differently: Place 1 tablet under the tongue Every 5 (Five) Minutes As Needed for Chest Pain. Take no more than 3 doses in 15 minutes. 3/26/21   Johan, DANIELA Reid   ondansetron  "(Zofran) 4 MG tablet Take 1 tablet by mouth Every 8 (Eight) Hours As Needed for Nausea or Vomiting. 1/22/24   Moises Oliveira MD   predniSONE (DELTASONE) 5 MG tablet TAKE ONE TABLET BY MOUTH DAILY 2/19/24   Moises Oliveira MD   salsalate (DISALCID) 500 MG tablet TAKE 5 TABLETS BY MOUTH ON MONDAY, WEDNESDAY, AND FRIDAY AND TAKE 4 TABLETS BY MOUTH ON TUESDAY, THURSDAY, AND SATURDAY 12/1/23   Moises Oliveira MD   sucralfate (CARAFATE) 1 g tablet TAKE ONE TABLET BY MOUTH FOUR TIMES A DAY BEFORE MEALS AND AT BEDTIME 12/7/23   Moises Oliveira MD   traMADol (ULTRAM) 50 MG tablet Take 1 tablet by mouth Every 6 (Six) Hours As Needed for Moderate Pain.    Provider, MD Leila   valACYclovir (VALTREX) 500 MG tablet TAKE ONE TABLET BY MOUTH DAILY 1/29/24   Moises Oliveira MD   Vibegron 75 MG tablet Take 1 tablet by mouth Daily. 1/17/24   Ky Plascencia MD     I have utilized all available immediate resources to obtain, update, or review the patient's current medications (including all prescriptions, over-the-counter products, herbals, cannabis/cannabidiol products, and vitamin/mineral/dietary (nutritional) supplements).    Objective     Vital Signs: BP (!) 134/110 (BP Location: Left arm, Patient Position: Lying)   Pulse 104   Temp 100.2 °F (37.9 °C) (Oral)   Resp 20   Ht 170.2 cm (67\")   Wt 99.9 kg (220 lb 4.8 oz)   LMP  (LMP Unknown)   SpO2 95%   BMI 34.50 kg/m²   Physical Exam  Vitals and nursing note reviewed.   Constitutional:       Appearance: Normal appearance. She is obese.   HENT:      Head: Normocephalic and atraumatic.      Right Ear: External ear normal.      Left Ear: External ear normal.      Mouth/Throat:      Mouth: Mucous membranes are moist.   Eyes:      Extraocular Movements: Extraocular movements intact.      Pupils: Pupils are equal, round, and reactive to light.   Cardiovascular:      Rate and Rhythm: Normal rate and regular rhythm.      Pulses: Normal pulses.      Heart " sounds: Normal heart sounds.   Pulmonary:      Effort: Pulmonary effort is normal.      Breath sounds: Normal breath sounds.   Abdominal:      General: Bowel sounds are normal.      Palpations: Abdomen is soft.   Musculoskeletal:         General: Normal range of motion.      Cervical back: Normal range of motion and neck supple.      Right lower leg: Edema (Trace) present.      Left lower leg: Edema (Trace) present.   Skin:     General: Skin is warm and dry.      Capillary Refill: Capillary refill takes less than 2 seconds.   Neurological:      General: No focal deficit present.      Mental Status: She is alert and oriented to person, place, and time.   Psychiatric:         Mood and Affect: Mood normal.         Behavior: Behavior normal.              Results Reviewed:  Lab Results (last 24 hours)       Procedure Component Value Units Date/Time    Respiratory Panel PCR w/COVID-19(SARS-CoV-2) [facility] In-House, NP Swab in UTM/VTM, 2 HR TAT - Swab, Nasopharynx [[id number]]  (Normal) Collected: 04/19/24 0902    Specimen: Swab from Nasopharynx Updated: 04/19/24 1028     ADENOVIRUS, PCR Not Detected     Coronavirus 229E Not Detected     Coronavirus HKU1 Not Detected     Coronavirus NL63 Not Detected     Coronavirus OC43 Not Detected     COVID19 Not Detected     Human Metapneumovirus Not Detected     Human Rhinovirus/Enterovirus Not Detected     Influenza A PCR Not Detected     Influenza B PCR Not Detected     Parainfluenza Virus 1 Not Detected     Parainfluenza Virus 2 Not Detected     Parainfluenza Virus 3 Not Detected     Parainfluenza Virus 4 Not Detected     RSV, PCR Not Detected     Bordetella pertussis pcr Not Detected     Bordetella parapertussis PCR Not Detected     Chlamydophila pneumoniae PCR Not Detected     Mycoplasma pneumo by PCR Not Detected    Narrative:      In the setting of a positive respiratory panel with a viral infection PLUS a negative procalcitonin without other underlying concern  for bacterial infection, consider observing off antibiotics or discontinuation of antibiotics and continue supportive care. If the respiratory panel is positive for atypical bacterial infection (Bordetella pertussis, Chlamydophila pneumoniae, or Mycoplasma pneumoniae), consider antibiotic de-escalation to target atypical bacterial infection.    Urinalysis With Culture If Indicated - Straight Cath [[id number]]  (Abnormal) Collected: 04/19/24 0932    Specimen: Urine from Straight Cath Updated: 04/19/24 0959     Color, UA Yellow     Appearance, UA Turbid     pH, UA 7.0     Specific Gravity, UA 1.020     Glucose, UA Negative     Ketones, UA Negative     Bilirubin, UA Negative     Blood, UA Moderate (2+)     Protein, UA 30 mg/dL (1+)     Leuk Esterase, UA Large (3+)     Nitrite, UA Positive     Urobilinogen, UA 0.2 E.U./dL    Narrative:      In absence of clinical symptoms, the presence of pyuria, bacteria, and/or nitrites on the urinalysis result does not correlate with infection.    Urinalysis, Microscopic Only - [facility] [[id number]]  (Abnormal) Collected: 04/19/24 0932    Specimen: Urine from Straight Cath Updated: 04/19/24 0959     RBC, UA 0-2 /HPF      WBC, UA Too Numerous to Count /HPF      Bacteria, UA 3+ /HPF      Squamous Epithelial Cells, UA 0-2 /HPF      Hyaline Casts, UA None Seen /LPF      Amorphous Crystals, UA Large/3+ /HPF      Methodology Manual Light Microscopy    Urine Culture - Urine, Straight [facility] [[id number]] Collected: 04/19/24 0932    Specimen: Urine from Straight Cath Updated: 04/19/24 0959    Procalcitonin [[id number]]  (Normal) Collected: 04/19/24 0856    Specimen: Blood Updated: 04/19/24 0935     Procalcitonin 0.09 ng/mL     Narrative:      As a Marker for Sepsis (Non-Neonates):    1. <0.5 ng/mL represents a low risk of severe sepsis and/or septic shock.  2. >2 ng/mL represents a high risk of severe sepsis and/or septic shock.    As a Marker for Lower Respiratory Tract Infections that  "require antibiotic therapy:    PCT on Admission    Antibiotic Therapy       6-12 Hrs later    >0.5                Strongly Recommended  >0.25 - <0.5        Recommended   0.1 - 0.25          Discouraged              Remeasure/reassess PCT  <0.1                Strongly Discouraged     Remeasure/reassess PCT    As 28 day mortality risk marker: \"Change in Procalcitonin Result\" (>80% or <=80%) if Day 0 (or Day 1) and Day 4 values are available. Refer to http://www.[facility]-pct-calculator.com    Change in PCT <=80%  A decrease of PCT levels below or equal to 80% defines a positive change in PCT test result representing a higher risk for 28-day all-cause mortality of patients diagnosed with severe sepsis for septic shock.    Change in PCT >80%  A decrease of PCT levels of more than 80% defines a negative change in PCT result representing a lower risk for 28-day all-cause mortality of patients diagnosed with severe sepsis or septic shock.       Comprehensive Metabolic Panel [[id number]]  (Abnormal) Collected: 04/19/24 0856    Specimen: Blood Updated: 04/19/24 0931     Glucose 131 mg/dL      BUN 23 mg/dL      Creatinine 1.21 mg/dL      Sodium 141 mmol/L      Potassium 2.9 mmol/L      Chloride 101 mmol/L      CO2 30.0 mmol/L      Calcium 9.5 mg/dL      Total Protein 6.9 g/dL      Albumin 3.3 g/dL      ALT (SGPT) 25 U/L      AST (SGOT) 23 U/L      Alkaline Phosphatase 70 U/L      Total Bilirubin 0.4 mg/dL      Globulin 3.6 gm/dL      A/G Ratio 0.9 g/dL      BUN/Creatinine Ratio 19.0     Anion Gap 10.0 mmol/L      eGFR 48.3 mL/min/1.73     Narrative:      GFR Normal >60  Chronic Kidney Disease <60  Kidney Failure <15      Magnesium [[id number]]  (Normal) Collected: 04/19/24 0856    Specimen: Blood Updated: 04/19/24 0931     Magnesium 1.6 mg/dL     BNP [[id number]]  (Abnormal) Collected: 04/19/24 0856    Specimen: Blood Updated: 04/19/24 0928     proBNP 930.2 pg/mL     Narrative:      This assay is used as an aid in the diagnosis of " individuals suspected of having heart failure. It can be used as an aid in the diagnosis of acute decompensated heart failure (ADHF) in patients presenting with signs and symptoms of ADHF to the emergency department (ED). In addition, NT-proBNP of <300 pg/mL indicates ADHF is not likely.    Age Range Result Interpretation  NT-proBNP Concentration (pg/mL:      <50             Positive            >450                   Gray                 300-450                    Negative             <300    50-75           Positive            >900                  Gray                300-900                  Negative            <300      >75             Positive            >1800                  Gray                300-1800                  Negative            <300    Lipase [[id number]]  (Normal) Collected: 04/19/24 0856    Specimen: Blood Updated: 04/19/24 0926     Lipase 16 U/L     Lactic Acid, Plasma [[id number]]  (Normal) Collected: 04/19/24 0856    Specimen: Blood Updated: 04/19/24 0922     Lactate 1.6 mmol/L     D-dimer, Quantitative [[id number]]  (Abnormal) Collected: 04/19/24 0856    Specimen: Blood Updated: 04/19/24 0919     D-Dimer, Quantitative 1.63 MCGFEU/mL     Narrative:      According to the assay 's published package insert, a normal (<0.50 MCGFEU/mL) D-dimer result in conjunction with a non-high clinical probability assessment, excludes deep vein thrombosis (DVT) and pulmonary embolism (PE) with high sensitivity.    D-dimer values increase with age and this can make VTE exclusion of an older population difficult. To address this, the [employer], based on best available evidence and recent guidelines, recommends that clinicians use age-adjusted D-dimer thresholds in patients greater than 50 years of age with: a) a low probability of PE who do not meet all Pulmonary Embolism Rule Out Criteria, or b) in those with intermediate probability of PE.   The formula for an age-adjusted D-dimer  "cut-off is \"age/100\".  For example, a 60 year old patient would have an age-adjusted cut-off of 0.60 MCGFEU/mL and an 80 year old 0.80 MCGFEU/mL.    Blood Culture - Blood, Hand, Left [[id number]] Collected: 04/19/24 0909    Specimen: Blood from Hand, Left Updated: 04/19/24 0919    Blood Culture - Blood, Arm, Right [[id number]] Collected: 04/19/24 0856    Specimen: Blood from Arm, Right Updated: 04/19/24 0917    CBC & Differential [[id number]]  (Abnormal) Collected: 04/19/24 0856    Specimen: Blood Updated: 04/19/24 0909    Narrative:      The following orders were created for panel order CBC & Differential.  Procedure                               Abnormality         Status                     ---------                               -----------         ------                     CBC Auto Differential[[id number]]        Abnormal            Final result                 Please view results for these tests on the individual orders.    CBC Auto Differential [[id number]]  (Abnormal) Collected: 04/19/24 0856    Specimen: Blood Updated: 04/19/24 0909     WBC 7.46 10*3/mm3      RBC 3.52 10*6/mm3      Hemoglobin 10.6 g/dL      Hematocrit 33.9 %      MCV 96.3 fL      MCH 30.1 pg      MCHC 31.3 g/dL      RDW 16.7 %      RDW-SD 58.0 fl      MPV 11.0 fL      Platelets 163 10*3/mm3      Neutrophil % 69.7 %      Lymphocyte % 16.5 %      Monocyte % 11.7 %      Eosinophil % 0.8 %      Basophil % 0.4 %      Immature Grans % 0.9 %      Neutrophils, Absolute 5.20 10*3/mm3      Lymphocytes, Absolute 1.23 10*3/mm3      Monocytes, Absolute 0.87 10*3/mm3      Eosinophils, Absolute 0.06 10*3/mm3      Basophils, Absolute 0.03 10*3/mm3      Immature Grans, Absolute 0.07 10*3/mm3      nRBC 0.3 /100 WBC           Imaging Results (Last 24 Hours)       Procedure Component Value Units Date/Time    CT Angiogram Chest [[id number]] Collected: 04/19/24 1018     Updated: 04/19/24 1031    Narrative:      EXAMINATION: CT ANGIOGRAM CHEST-      4/19/2024 8:52 " AM     HISTORY: Pulmonary embolism (PE) suspected, unknown D-dimer     In order to have a CT radiation dose as low as reasonably achievable  Automated Exposure Control was utilized for adjustment of the mA and/or  KV according to patient size.     Total DLP = 252.18 mGy.cm     CT angiography of the chest is performed after intravenous contrast  enhancement.     Images are acquired in axial plane and subsequent reconstruction in  coronal and sagittal planes.     Comparison is made with the previous study dated 4/22/2022.     There is moderate opacification of the pulmonary arteries and branches.  No filling defects in the opacified pulmonary arterial bed.     RV/LV ratio is 27/46 which is normal. No finding to suggest right heart  strain.     Atheromatous change of thoracic aorta noted. No aneurysmal dilatation.  No dissection.     Severe atheromatous changes of coronary arteries are noted.     There is no evidence of mediastinal or hilar mass or lymphadenopathy.     Normal origin course and caliber of the brachiocephalic, left common  carotid and left subclavian arteries.     Limited visualized soft tissues of the neck are unremarkable. No mass or  lymphadenopathy.     There is no axillary lymphadenopathy.     The lungs are poorly expanded with areas of discoid atelectasis in the  lungs bilaterally.     There are no discrete noncalcified nodule/pulmonary masses on either  side.     A few small calcified granulomas bilaterally are seen.     The central airway is hypoexpanded due to expiratory phase study. No  intrinsic abnormality.     Limited visualized abdomen show moderately distended gallbladder with a  radiopaque calculus in the neck of the gallbladder. There are radiopaque  calculi in the incompletely visualized left kidney. Right kidney is  poorly visualized. There is moderate dilatation of the left renal pelvis  which is incompletely included and not well evaluated. Possibility of  hydronephrosis on the left  may not be excluded.     Images reviewed in bone window show chronic degenerative changes of the  thoracic spine. There is compression fracture and kyphoplasty of  vertebra T12 which was not seen in the previous study.       Impression:      1. No evidence of pulmonary embolism. No aortic aneurysm or dissection.  2. Severe atheromatous change of coronary arteries.  3. The lungs are poorly expanded but unremarkable.  4. Significantly dilated gallbladder with a radiopaque calculus in the  neck of the gallbladder. No finding to suggest cholecystitis.  5. Left renal calculi. Details given above.  6. Compression fracture and kyphoplasty of vertebra T12.                       This report was signed and finalized on 4/19/2024 10:27 AM by Dr. Jose Patton MD.       US Venous Doppler Lower Extremity Right (duplex) [[id number]] Resulted: 04/19/24 0922     Updated: 04/19/24 0954    XR Chest 1 View [[id number]] Collected: 04/19/24 0916     Updated: 04/19/24 0920    Narrative:      XR CHEST 1 VW- 4/19/2024 8:07 AM     HISTORY: fever       COMPARISON: Chest x-ray dated 6/11/2022     FINDINGS:  Upright frontal radiograph of the chest was obtained     No lung consolidation. No pleural effusion or pneumothorax. The  cardiomediastinal silhouette and pulmonary vascularity are within normal  limits. Left chest wall dual-chamber pacemaker which is stable. No acute  bony abnormality.       Impression:      1.  Stable chest exam without acute process. No acute infiltrate.     This report was signed and finalized on 4/19/2024 9:17 AM by Dr Riik David.             I have personally reviewed and interpreted the radiology studies and ECG obtained at time of admission.     Assessment / Plan   Assessment:   Active Hospital Problems    Diagnosis     **UTI (urinary tract infection)        Treatment Plan  The patient will be admitted to my service here at [facility].   Admit to the hospital  IV cefepime  Blood cultures are  pending  Urine cultures pending    Medical Decision Making  Number and Complexity of problems:   UTI moderate complexity  Differential Diagnosis:     Conditions and Status        Condition is unchanged.     MDM Data  External documents reviewed: Reviewed  Cardiac tracing (EKG, telemetry) interpretation: Reviewed  Radiology interpretation: Reviewed   Labs reviewed: Reviewed  Any tests that were considered but not ordered: None     Decision rules/scores evaluated (example [id number], Wells, etc): None      Discussed with: Patient     Care Planning  Shared decision making: Patient  Code status and discussions: DNR/DNI    Disposition  Social Determinants of Health that impact treatment or disposition: None  Estimated length of stay is 1 to 3 days.     I confirmed that the patient's advanced care plan is present, code status is documented, and a surrogate decision maker is listed in the patient's medical record.     The patient's surrogate decision maker is Mackenzie/daughter.     The patient was seen and examined by me on 4/19/2024 at 1110.    Electronically signed by Leta Julian DO, 04/19/24, 11:33 CDT.

## 2024-04-20 PROBLEM — B96.20 E COLI BACTEREMIA: Status: ACTIVE | Noted: 2021-10-22

## 2024-04-20 LAB
ALBUMIN SERPL-MCNC: 3 G/DL (ref 3.5–5.2)
ALBUMIN/GLOB SERPL: 0.9 G/DL
ALP SERPL-CCNC: 58 U/L (ref 39–117)
ALT SERPL W P-5'-P-CCNC: 25 U/L (ref 1–33)
ANION GAP SERPL CALCULATED.3IONS-SCNC: 10 MMOL/L (ref 5–15)
AST SERPL-CCNC: 26 U/L (ref 1–32)
BASOPHILS # BLD AUTO: 0.05 10*3/MM3 (ref 0–0.2)
BASOPHILS NFR BLD AUTO: 0.7 % (ref 0–1.5)
BILIRUB SERPL-MCNC: 0.4 MG/DL (ref 0–1.2)
BUN SERPL-MCNC: 25 MG/DL (ref 8–23)
BUN/CREAT SERPL: 16.1 (ref 7–25)
CALCIUM SPEC-SCNC: 8.7 MG/DL (ref 8.6–10.5)
CHLORIDE SERPL-SCNC: 102 MMOL/L (ref 98–107)
CO2 SERPL-SCNC: 29 MMOL/L (ref 22–29)
CREAT SERPL-MCNC: 1.55 MG/DL (ref 0.57–1)
DEPRECATED RDW RBC AUTO: 58.6 FL (ref 37–54)
EGFRCR SERPLBLD CKD-EPI 2021: 35.9 ML/MIN/1.73
EOSINOPHIL # BLD AUTO: 0.08 10*3/MM3 (ref 0–0.4)
EOSINOPHIL NFR BLD AUTO: 1 % (ref 0.3–6.2)
ERYTHROCYTE [DISTWIDTH] IN BLOOD BY AUTOMATED COUNT: 16.8 % (ref 12.3–15.4)
GLOBULIN UR ELPH-MCNC: 3.2 GM/DL
GLUCOSE SERPL-MCNC: 116 MG/DL (ref 65–99)
HCT VFR BLD AUTO: 29.6 % (ref 34–46.6)
HGB BLD-MCNC: 9 G/DL (ref 12–15.9)
IMM GRANULOCYTES # BLD AUTO: 0.12 10*3/MM3 (ref 0–0.05)
IMM GRANULOCYTES NFR BLD AUTO: 1.6 % (ref 0–0.5)
LYMPHOCYTES # BLD AUTO: 1.27 10*3/MM3 (ref 0.7–3.1)
LYMPHOCYTES NFR BLD AUTO: 16.5 % (ref 19.6–45.3)
MCH RBC QN AUTO: 29.7 PG (ref 26.6–33)
MCHC RBC AUTO-ENTMCNC: 30.4 G/DL (ref 31.5–35.7)
MCV RBC AUTO: 97.7 FL (ref 79–97)
MONOCYTES # BLD AUTO: 1.09 10*3/MM3 (ref 0.1–0.9)
MONOCYTES NFR BLD AUTO: 14.2 % (ref 5–12)
NEUTROPHILS NFR BLD AUTO: 5.08 10*3/MM3 (ref 1.7–7)
NEUTROPHILS NFR BLD AUTO: 66 % (ref 42.7–76)
NRBC BLD AUTO-RTO: 0.7 /100 WBC (ref 0–0.2)
PLATELET # BLD AUTO: 138 10*3/MM3 (ref 140–450)
PMV BLD AUTO: 11.6 FL (ref 6–12)
POTASSIUM SERPL-SCNC: 3.1 MMOL/L (ref 3.5–5.2)
POTASSIUM SERPL-SCNC: 4 MMOL/L (ref 3.5–5.2)
PROT SERPL-MCNC: 6.2 G/DL (ref 6–8.5)
RBC # BLD AUTO: 3.03 10*6/MM3 (ref 3.77–5.28)
SODIUM SERPL-SCNC: 141 MMOL/L (ref 136–145)
WBC NRBC COR # BLD AUTO: 7.69 10*3/MM3 (ref 3.4–10.8)

## 2024-04-20 PROCEDURE — 36415 COLL VENOUS BLD VENIPUNCTURE: CPT | Performed by: FAMILY MEDICINE

## 2024-04-20 PROCEDURE — 84132 ASSAY OF SERUM POTASSIUM: CPT | Performed by: FAMILY MEDICINE

## 2024-04-20 PROCEDURE — 25010000002 CEFEPIME PER 500 MG: Performed by: FAMILY MEDICINE

## 2024-04-20 PROCEDURE — 63710000001 PREDNISONE PER 5 MG: Performed by: FAMILY MEDICINE

## 2024-04-20 PROCEDURE — 80053 COMPREHEN METABOLIC PANEL: CPT | Performed by: FAMILY MEDICINE

## 2024-04-20 PROCEDURE — 87040 BLOOD CULTURE FOR BACTERIA: CPT | Performed by: FAMILY MEDICINE

## 2024-04-20 PROCEDURE — 85025 COMPLETE CBC W/AUTO DIFF WBC: CPT | Performed by: FAMILY MEDICINE

## 2024-04-20 RX ORDER — POTASSIUM CHLORIDE 750 MG/1
40 CAPSULE, EXTENDED RELEASE ORAL EVERY 4 HOURS
Status: COMPLETED | OUTPATIENT
Start: 2024-04-20 | End: 2024-04-20

## 2024-04-20 RX ADMIN — VALACYCLOVIR 500 MG: 500 TABLET, FILM COATED ORAL at 09:31

## 2024-04-20 RX ADMIN — SUCRALFATE 1 G: 1 TABLET ORAL at 11:38

## 2024-04-20 RX ADMIN — TRAMADOL HYDROCHLORIDE 50 MG: 50 TABLET, COATED ORAL at 21:08

## 2024-04-20 RX ADMIN — CARVEDILOL 25 MG: 25 TABLET, FILM COATED ORAL at 09:31

## 2024-04-20 RX ADMIN — FUROSEMIDE 40 MG: 40 TABLET ORAL at 09:31

## 2024-04-20 RX ADMIN — TRAMADOL HYDROCHLORIDE 50 MG: 50 TABLET, COATED ORAL at 05:08

## 2024-04-20 RX ADMIN — FLUTICASONE PROPIONATE 1 SPRAY: 50 SPRAY, METERED NASAL at 09:32

## 2024-04-20 RX ADMIN — PREDNISONE 5 MG: 5 TABLET ORAL at 09:32

## 2024-04-20 RX ADMIN — POTASSIUM CHLORIDE 40 MEQ: 750 CAPSULE, EXTENDED RELEASE ORAL at 17:13

## 2024-04-20 RX ADMIN — POTASSIUM CHLORIDE 40 MEQ: 750 CAPSULE, EXTENDED RELEASE ORAL at 09:31

## 2024-04-20 RX ADMIN — Medication 1 TABLET: at 09:31

## 2024-04-20 RX ADMIN — Medication: at 09:32

## 2024-04-20 RX ADMIN — CEFEPIME 2000 MG: 2 INJECTION, POWDER, FOR SOLUTION INTRAVENOUS at 17:13

## 2024-04-20 RX ADMIN — APIXABAN 5 MG: 5 TABLET, FILM COATED ORAL at 09:31

## 2024-04-20 RX ADMIN — ISOSORBIDE MONONITRATE 60 MG: 60 TABLET, EXTENDED RELEASE ORAL at 09:30

## 2024-04-20 RX ADMIN — APIXABAN 5 MG: 5 TABLET, FILM COATED ORAL at 21:08

## 2024-04-20 RX ADMIN — SUCRALFATE 1 G: 1 TABLET ORAL at 17:13

## 2024-04-20 RX ADMIN — LEFLUNOMIDE 20 MG: 20 TABLET ORAL at 09:31

## 2024-04-20 RX ADMIN — SUCRALFATE 1 G: 1 TABLET ORAL at 09:31

## 2024-04-20 RX ADMIN — CARVEDILOL 25 MG: 25 TABLET, FILM COATED ORAL at 17:13

## 2024-04-20 RX ADMIN — SUCRALFATE 1 G: 1 TABLET ORAL at 21:08

## 2024-04-20 RX ADMIN — FOLIC ACID 1000 MCG: 1 TABLET ORAL at 09:31

## 2024-04-20 RX ADMIN — LOSARTAN POTASSIUM 50 MG: 50 TABLET, FILM COATED ORAL at 09:32

## 2024-04-20 RX ADMIN — DULOXETINE HYDROCHLORIDE 60 MG: 30 CAPSULE, DELAYED RELEASE ORAL at 09:31

## 2024-04-20 RX ADMIN — FERROUS SULFATE TAB 325 MG (65 MG ELEMENTAL FE) 325 MG: 325 (65 FE) TAB at 09:31

## 2024-04-20 RX ADMIN — LEVOTHYROXINE SODIUM 75 MCG: 75 TABLET ORAL at 05:05

## 2024-04-20 RX ADMIN — Medication: at 21:09

## 2024-04-20 RX ADMIN — POTASSIUM CHLORIDE 40 MEQ: 750 CAPSULE, EXTENDED RELEASE ORAL at 12:54

## 2024-04-20 RX ADMIN — CEFEPIME 2000 MG: 2 INJECTION, POWDER, FOR SOLUTION INTRAVENOUS at 05:05

## 2024-04-20 RX ADMIN — ASPIRIN 81 MG: 81 TABLET, COATED ORAL at 09:31

## 2024-04-20 NOTE — PLAN OF CARE
Goal Outcome Evaluation:      Pt is A&Ox4 and afebrile. Purewick is in place. On tele she has been normal sinus 78-85. Caregiver is at bedside. Potassium was replaced per MD. Recieved IV antibiotics.

## 2024-04-20 NOTE — PROGRESS NOTES
[facility]  INPATIENT PROGRESS NOTE    Patient Name: Tawny Shin  Date of Admission: 4/19/2024  Today's Date: 04/20/24  Length of Stay: 1  Primary Care Physician: Moises Oliveira MD    Subjective   Chief Complaint: Patient presented to hospital with generalized weakness and nausea with vomiting    HPI   Patient is feeling better now.  She has no nausea.  She is eating breakfast without difficulty.  She reminds me that she is nonambulatory.        Review of Systems   All pertinent negatives and positives are as above. All other systems have been reviewed and are negative unless otherwise stated.     Objective    Temp:  [97.6 °F (36.4 °C)-99.8 °F (37.7 °C)] 99.1 °F (37.3 °C)  Heart Rate:  [] 80  Resp:  [18] 18  BP: (104-184)/(46-83) 146/66  Physical Exam  Vitals and nursing note reviewed.   Constitutional:       Appearance: She is well-developed. She is obese.   HENT:      Head: Normocephalic and atraumatic.      Right Ear: External ear normal.      Left Ear: External ear normal.      Nose: Nose normal.      Mouth/Throat:      Mouth: Mucous membranes are moist.   Eyes:      Extraocular Movements: Extraocular movements intact.      Conjunctiva/sclera: Conjunctivae normal.      Pupils: Pupils are equal, round, and reactive to light.   Neck:      Thyroid: No thyromegaly.      Vascular: No JVD.      Trachea: No tracheal deviation.   Cardiovascular:      Rate and Rhythm: Normal rate and regular rhythm.      Pulses: Normal pulses.      Heart sounds: Normal heart sounds. No murmur heard.     No friction rub. No gallop.   Pulmonary:      Effort: Pulmonary effort is normal.      Breath sounds: Normal breath sounds.   Abdominal:      General: Bowel sounds are normal. There is no distension.      Palpations: Abdomen is soft.      Tenderness: There is no abdominal tenderness.   Musculoskeletal:         General: Normal range of motion.      Cervical back: Normal  range of motion and neck supple.   Lymphadenopathy:      Cervical: No cervical adenopathy.   Skin:     General: Skin is warm and dry.      Capillary Refill: Capillary refill takes less than 2 seconds.   Neurological:      Mental Status: She is alert and oriented to person, place, and time.      Cranial Nerves: No cranial nerve deficit.      Coordination: Coordination normal.   Psychiatric:         Mood and Affect: Mood normal.         Behavior: Behavior normal.             Results Review:  I have reviewed the labs, radiology results, and diagnostic studies.    Laboratory Data:   Results from last 7 days   Lab Units 04/20/24  0226 04/19/24  0856   WBC 10*3/mm3 7.69 7.46   HEMOGLOBIN g/dL 9.0* 10.6*   HEMATOCRIT % 29.6* 33.9*   PLATELETS 10*3/mm3 138* 163        Results from last 7 days   Lab Units 04/20/24  0226 04/19/24  0856   SODIUM mmol/L 141 141   POTASSIUM mmol/L 3.1* 2.9*   CHLORIDE mmol/L 102 101   CO2 mmol/L 29.0 30.0*   BUN mg/dL 25* 23   CREATININE mg/dL 1.55* 1.21*   CALCIUM mg/dL 8.7 9.5   BILIRUBIN mg/dL 0.4 0.4   ALK PHOS U/L 58 70   ALT (SGPT) U/L 25 25   AST (SGOT) U/L 26 23   GLUCOSE mg/dL 116* 131*       Culture Data:   Blood Culture   Date Value Ref Range Status   04/19/2024 Abnormal Stain (C)  Preliminary   04/19/2024 Abnormal Stain (C)  Preliminary       Radiology Data:   Imaging Results (Last 24 Hours)       Procedure Component Value Units Date/Time    US Venous Doppler Lower Extremity Right (duplex) [[id number]] Collected: 04/19/24 1751     Updated: 04/19/24 1754    Narrative:      History: Pain and swelling       Impression:      Impression: There is no evidence of deep venous thrombosis or  superficial thrombophlebitis of the right lower extremity.     Comments: Right lower extremity venous duplex exam was performed using  color Doppler flow, Doppler wave form analysis, and grayscale imaging,  with and without compression. There is no evidence of deep venous  thrombosis of the common femoral,  superficial femoral, and popliteal  veins. The tibial veins were not visualized due to swelling. There is no  thrombus identified in the saphenofemoral junction or the greater  saphenous vein.         This report was signed and finalized on 4/19/2024 5:51 PM by Dr. Kannan Mcgraw MD.       CT Angiogram Chest [[id number]] Collected: 04/19/24 1018     Updated: 04/19/24 1031    Narrative:      EXAMINATION: CT ANGIOGRAM CHEST-      4/19/2024 8:52 AM     HISTORY: Pulmonary embolism (PE) suspected, unknown D-dimer     In order to have a CT radiation dose as low as reasonably achievable  Automated Exposure Control was utilized for adjustment of the mA and/or  KV according to patient size.     Total DLP = 252.18 mGy.cm     CT angiography of the chest is performed after intravenous contrast  enhancement.     Images are acquired in axial plane and subsequent reconstruction in  coronal and sagittal planes.     Comparison is made with the previous study dated 4/22/2022.     There is moderate opacification of the pulmonary arteries and branches.  No filling defects in the opacified pulmonary arterial bed.     RV/LV ratio is 27/46 which is normal. No finding to suggest right heart  strain.     Atheromatous change of thoracic aorta noted. No aneurysmal dilatation.  No dissection.     Severe atheromatous changes of coronary arteries are noted.     There is no evidence of mediastinal or hilar mass or lymphadenopathy.     Normal origin course and caliber of the brachiocephalic, left common  carotid and left subclavian arteries.     Limited visualized soft tissues of the neck are unremarkable. No mass or  lymphadenopathy.     There is no axillary lymphadenopathy.     The lungs are poorly expanded with areas of discoid atelectasis in the  lungs bilaterally.     There are no discrete noncalcified nodule/pulmonary masses on either  side.     A few small calcified granulomas bilaterally are seen.     The central airway is hypoexpanded  due to expiratory phase study. No  intrinsic abnormality.     Limited visualized abdomen show moderately distended gallbladder with a  radiopaque calculus in the neck of the gallbladder. There are radiopaque  calculi in the incompletely visualized left kidney. Right kidney is  poorly visualized. There is moderate dilatation of the left renal pelvis  which is incompletely included and not well evaluated. Possibility of  hydronephrosis on the left may not be excluded.     Images reviewed in bone window show chronic degenerative changes of the  thoracic spine. There is compression fracture and kyphoplasty of  vertebra T12 which was not seen in the previous study.       Impression:      1. No evidence of pulmonary embolism. No aortic aneurysm or dissection.  2. Severe atheromatous change of coronary arteries.  3. The lungs are poorly expanded but unremarkable.  4. Significantly dilated gallbladder with a radiopaque calculus in the  neck of the gallbladder. No finding to suggest cholecystitis.  5. Left renal calculi. Details given above.  6. Compression fracture and kyphoplasty of vertebra T12.                       This report was signed and finalized on 4/19/2024 10:27 AM by Dr. Jose Patton MD.               I have reviewed the patient's current medications.     Assessment/Plan   Assessment  Active Hospital Problems    Diagnosis     **UTI (urinary tract infection)     E coli bacteremia        Treatment Plan  Continue cefepime  Repeat blood cultures tomorrow  Out of bed  Continue current medications  BMP and CBC in a.m.    Medical Decision Making  Number and Complexity of problems:   UTI moderate complexity  E. coli bacteremia high complexity  Differential Diagnosis:      Conditions and Status        Condition is unchanged.     [facility] Data  External documents reviewed: Reviewed  Cardiac tracing (EKG, telemetry) interpretation: Reviewed  Radiology interpretation: Reviewed   Labs reviewed: Reviewed  Any tests that  were considered but not ordered: None     Decision rules/scores evaluated (example [id number], Wells, etc): None      Discussed with: Patient     Care Planning  Shared decision making: Patient  Code status and discussions: DNR/DNI     Disposition  Social Determinants of Health that impact treatment or disposition: None  Estimated length of stay is 2 to 3 days.     Electronically signed by Leta Julian DO, 04/20/24, 10:20 CDT.

## 2024-04-20 NOTE — PLAN OF CARE
Goal Outcome Evaluation:              Outcome Evaluation: A&OX4. Bedrest. Purewick in place. Receiving IV cefepime. X1 blood culture positive. C/O neck pain once this shift. PRN medication given. VS stable. Caregiver at bedside. Will continue to monitor. NSR 72-80

## 2024-04-21 ENCOUNTER — APPOINTMENT (OUTPATIENT)
Dept: CT IMAGING | Facility: HOSPITAL | Age: 69
End: 2024-04-21
Payer: MEDICARE

## 2024-04-21 PROBLEM — N18.32 STAGE 3B CHRONIC KIDNEY DISEASE: Status: ACTIVE | Noted: 2024-04-21

## 2024-04-21 PROBLEM — R22.2 NODULE OF BUTTOCK: Status: ACTIVE | Noted: 2024-04-21

## 2024-04-21 LAB
ANION GAP SERPL CALCULATED.3IONS-SCNC: 10 MMOL/L (ref 5–15)
BACTERIA SPEC AEROBE CULT: NORMAL
BASOPHILS # BLD AUTO: 0.05 10*3/MM3 (ref 0–0.2)
BASOPHILS NFR BLD AUTO: 0.9 % (ref 0–1.5)
BUN SERPL-MCNC: 27 MG/DL (ref 8–23)
BUN/CREAT SERPL: 18.2 (ref 7–25)
CALCIUM SPEC-SCNC: 8.1 MG/DL (ref 8.6–10.5)
CHLORIDE SERPL-SCNC: 105 MMOL/L (ref 98–107)
CO2 SERPL-SCNC: 24 MMOL/L (ref 22–29)
CREAT SERPL-MCNC: 1.48 MG/DL (ref 0.57–1)
DEPRECATED RDW RBC AUTO: 58.8 FL (ref 37–54)
EGFRCR SERPLBLD CKD-EPI 2021: 37.9 ML/MIN/1.73
EOSINOPHIL # BLD AUTO: 0.11 10*3/MM3 (ref 0–0.4)
EOSINOPHIL NFR BLD AUTO: 1.9 % (ref 0.3–6.2)
ERYTHROCYTE [DISTWIDTH] IN BLOOD BY AUTOMATED COUNT: 16.7 % (ref 12.3–15.4)
GLUCOSE SERPL-MCNC: 93 MG/DL (ref 65–99)
HCT VFR BLD AUTO: 28.6 % (ref 34–46.6)
HGB BLD-MCNC: 8.7 G/DL (ref 12–15.9)
IMM GRANULOCYTES # BLD AUTO: 0.13 10*3/MM3 (ref 0–0.05)
IMM GRANULOCYTES NFR BLD AUTO: 2.3 % (ref 0–0.5)
LYMPHOCYTES # BLD AUTO: 1.53 10*3/MM3 (ref 0.7–3.1)
LYMPHOCYTES NFR BLD AUTO: 26.6 % (ref 19.6–45.3)
MCH RBC QN AUTO: 29.9 PG (ref 26.6–33)
MCHC RBC AUTO-ENTMCNC: 30.4 G/DL (ref 31.5–35.7)
MCV RBC AUTO: 98.3 FL (ref 79–97)
MONOCYTES # BLD AUTO: 0.84 10*3/MM3 (ref 0.1–0.9)
MONOCYTES NFR BLD AUTO: 14.6 % (ref 5–12)
NEUTROPHILS NFR BLD AUTO: 3.1 10*3/MM3 (ref 1.7–7)
NEUTROPHILS NFR BLD AUTO: 53.7 % (ref 42.7–76)
NRBC BLD AUTO-RTO: 0.3 /100 WBC (ref 0–0.2)
PLATELET # BLD AUTO: 126 10*3/MM3 (ref 140–450)
PMV BLD AUTO: 10.1 FL (ref 6–12)
POTASSIUM SERPL-SCNC: 4 MMOL/L (ref 3.5–5.2)
RBC # BLD AUTO: 2.91 10*6/MM3 (ref 3.77–5.28)
SODIUM SERPL-SCNC: 139 MMOL/L (ref 136–145)
WBC NRBC COR # BLD AUTO: 5.76 10*3/MM3 (ref 3.4–10.8)

## 2024-04-21 PROCEDURE — 85025 COMPLETE CBC W/AUTO DIFF WBC: CPT | Performed by: FAMILY MEDICINE

## 2024-04-21 PROCEDURE — 72193 CT PELVIS W/DYE: CPT

## 2024-04-21 PROCEDURE — 63710000001 PREDNISONE PER 5 MG: Performed by: FAMILY MEDICINE

## 2024-04-21 PROCEDURE — 99221 1ST HOSP IP/OBS SF/LOW 40: CPT | Performed by: SURGERY

## 2024-04-21 PROCEDURE — 25010000002 CEFEPIME PER 500 MG: Performed by: FAMILY MEDICINE

## 2024-04-21 PROCEDURE — 25510000001 IOPAMIDOL 61 % SOLUTION: Performed by: FAMILY MEDICINE

## 2024-04-21 PROCEDURE — 25810000003 SODIUM CHLORIDE 0.9 % SOLUTION: Performed by: FAMILY MEDICINE

## 2024-04-21 PROCEDURE — 80048 BASIC METABOLIC PNL TOTAL CA: CPT | Performed by: FAMILY MEDICINE

## 2024-04-21 RX ORDER — SODIUM CHLORIDE 9 MG/ML
75 INJECTION, SOLUTION INTRAVENOUS ONCE
Status: COMPLETED | OUTPATIENT
Start: 2024-04-21 | End: 2024-04-21

## 2024-04-21 RX ORDER — LOSARTAN POTASSIUM 50 MG/1
50 TABLET ORAL DAILY
Status: ON HOLD | COMMUNITY

## 2024-04-21 RX ORDER — VALACYCLOVIR HYDROCHLORIDE 500 MG/1
500 TABLET, FILM COATED ORAL DAILY PRN
Status: ON HOLD | COMMUNITY

## 2024-04-21 RX ORDER — CARVEDILOL 25 MG/1
25 TABLET ORAL 2 TIMES DAILY WITH MEALS
Status: ON HOLD | COMMUNITY

## 2024-04-21 RX ORDER — PREDNISONE 5 MG/1
5 TABLET ORAL DAILY
Status: ON HOLD | COMMUNITY

## 2024-04-21 RX ORDER — FERROUS SULFATE 324(65)MG
324 TABLET, DELAYED RELEASE (ENTERIC COATED) ORAL
Status: ON HOLD | COMMUNITY

## 2024-04-21 RX ORDER — DULOXETIN HYDROCHLORIDE 60 MG/1
60 CAPSULE, DELAYED RELEASE ORAL DAILY
Status: ON HOLD | COMMUNITY

## 2024-04-21 RX ORDER — NITROGLYCERIN 0.4 MG/1
0.4 TABLET SUBLINGUAL
Status: ON HOLD | COMMUNITY

## 2024-04-21 RX ORDER — FOLIC ACID 1 MG/1
1 TABLET ORAL DAILY
Status: ON HOLD | COMMUNITY

## 2024-04-21 RX ORDER — FUROSEMIDE 40 MG/1
40 TABLET ORAL DAILY
Status: ON HOLD | COMMUNITY

## 2024-04-21 RX ORDER — ISOSORBIDE MONONITRATE 60 MG/1
60 TABLET, EXTENDED RELEASE ORAL DAILY
Status: ON HOLD | COMMUNITY

## 2024-04-21 RX ADMIN — DULOXETINE HYDROCHLORIDE 60 MG: 30 CAPSULE, DELAYED RELEASE ORAL at 08:11

## 2024-04-21 RX ADMIN — VALACYCLOVIR 500 MG: 500 TABLET, FILM COATED ORAL at 08:11

## 2024-04-21 RX ADMIN — CEFEPIME 2000 MG: 2 INJECTION, POWDER, FOR SOLUTION INTRAVENOUS at 05:43

## 2024-04-21 RX ADMIN — Medication: at 08:12

## 2024-04-21 RX ADMIN — TRAMADOL HYDROCHLORIDE 50 MG: 50 TABLET, COATED ORAL at 08:12

## 2024-04-21 RX ADMIN — ASPIRIN 81 MG: 81 TABLET, COATED ORAL at 08:11

## 2024-04-21 RX ADMIN — LEFLUNOMIDE 20 MG: 20 TABLET ORAL at 08:12

## 2024-04-21 RX ADMIN — LEVOTHYROXINE SODIUM 75 MCG: 75 TABLET ORAL at 05:43

## 2024-04-21 RX ADMIN — TRAMADOL HYDROCHLORIDE 50 MG: 50 TABLET, COATED ORAL at 23:20

## 2024-04-21 RX ADMIN — TRAMADOL HYDROCHLORIDE 50 MG: 50 TABLET, COATED ORAL at 17:07

## 2024-04-21 RX ADMIN — CARVEDILOL 25 MG: 25 TABLET, FILM COATED ORAL at 08:12

## 2024-04-21 RX ADMIN — CEFEPIME 2000 MG: 2 INJECTION, POWDER, FOR SOLUTION INTRAVENOUS at 17:05

## 2024-04-21 RX ADMIN — FOLIC ACID 1000 MCG: 1 TABLET ORAL at 08:12

## 2024-04-21 RX ADMIN — FUROSEMIDE 40 MG: 40 TABLET ORAL at 08:11

## 2024-04-21 RX ADMIN — LOSARTAN POTASSIUM 50 MG: 50 TABLET, FILM COATED ORAL at 08:11

## 2024-04-21 RX ADMIN — ISOSORBIDE MONONITRATE 60 MG: 60 TABLET, EXTENDED RELEASE ORAL at 05:43

## 2024-04-21 RX ADMIN — FERROUS SULFATE TAB 325 MG (65 MG ELEMENTAL FE) 325 MG: 325 (65 FE) TAB at 08:11

## 2024-04-21 RX ADMIN — IOPAMIDOL 100 ML: 612 INJECTION, SOLUTION INTRAVENOUS at 10:08

## 2024-04-21 RX ADMIN — SUCRALFATE 1 G: 1 TABLET ORAL at 11:05

## 2024-04-21 RX ADMIN — SUCRALFATE 1 G: 1 TABLET ORAL at 17:05

## 2024-04-21 RX ADMIN — Medication 1 TABLET: at 08:11

## 2024-04-21 RX ADMIN — PREDNISONE 5 MG: 5 TABLET ORAL at 08:11

## 2024-04-21 RX ADMIN — SUCRALFATE 1 G: 1 TABLET ORAL at 20:45

## 2024-04-21 RX ADMIN — CARVEDILOL 25 MG: 25 TABLET, FILM COATED ORAL at 17:05

## 2024-04-21 RX ADMIN — APIXABAN 5 MG: 5 TABLET, FILM COATED ORAL at 08:11

## 2024-04-21 RX ADMIN — SODIUM CHLORIDE 75 ML/HR: 9 INJECTION, SOLUTION INTRAVENOUS at 13:05

## 2024-04-21 RX ADMIN — FLUTICASONE PROPIONATE 1 SPRAY: 50 SPRAY, METERED NASAL at 08:12

## 2024-04-21 RX ADMIN — SUCRALFATE 1 G: 1 TABLET ORAL at 05:43

## 2024-04-21 RX ADMIN — Medication: at 20:45

## 2024-04-21 NOTE — PLAN OF CARE
Goal Outcome Evaluation:              Outcome Evaluation: A&OX4. Bedrest. Purewick in place. Receiving IV cefepime. X1 blood culture positive. Repeat blood cultures in AM. C/O neck pain once this shift. PRN medication given. VS stable. Caregiver at bedside. Will continue to monitor. NSR 67-73.

## 2024-04-21 NOTE — PLAN OF CARE
Goal Outcome Evaluation:      Pt is  A&Ox4 and afebrile. Pt had a CT which showed a lesion in right buttock. She will have an ultrasound to evaluate the lesion tomorrow. Pain controlled with PRN pain meds.

## 2024-04-21 NOTE — CONSULTS
[facility]   Consult Note    Patient Name: Tawny Shin  : 1955  MRN: [id number]  Primary Care Physician:  Moises Oliveira MD  Referring Physician: No ref. provider found  Date of admission: 2024    Inpatient General Surgery Consult  Consult performed by: Moises Keyes MD  Consult ordered by: Leta Julian DO  Reason for consult: Right buttock nodule        Subjective   Subjective     Reason for Consult/ Chief Complaint: Right buttock nodule, pain    History of Present Illness  Tawny Shin is a 68 y.o. female who was admitted from the emergency department with a UTI that she presented with a fever.  She also had a chronic right lower leg wound with erythema but was determined to not have an abscess nor septic from the cellulitis.  She was started on broad-spectrum antibiotics.  During her workup, a CT of the pelvis was performed that showed a healing wound of the right hip as well as a 3 and half centimeter solid nodule in her right buttock.  She reports that she has had this nodule for quite some time and it fluctuates in size and also produces drainage from the site.  She has not had any recent drainage she does have a considerable amount of pain and tenderness.    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    Elevated cholesterol     Eustachian tube dysfunction     Heart disease     Herpes simplex     History of  "transfusion     Hyperlipidemia     Hypertension     Hypothyroidism 12/23/2020    Intrinsic asthma 12/23/2020    Knee dislocation     Labral tear of right hip joint     Laryngitis sicca     Laryngitis, chronic     Left carotid bruit 03/09/2016    MI (myocardial infarction)     Myalgia due to statin 06/25/2019    Open wound of right hip 09/14/2021    Osteomyelitis of right femur 07/06/2021    Otorrhea     Pacemaker 11/17/2016    Primary osteoarthritis of left knee 12/23/2020    Psoriasis vulgaris 12/23/2020    S/P coronary artery stent placement 03/09/2016    Sensorineural hearing loss     Seropositive rheumatoid arthritis of multiple sites 12/27/2019    Overview:  -myochrysine '93-'96 -methotrexate '96--->11/98;r/s  restarted 2/99--> 8/14 (anemia) -sulfasalazine- not effective -penicillamine 6/98-->10/98; no effect -leflunomide 11/98--> - Humira '13-->didn't take - Enbrel 12/14-->3/15- no effect!   Last Assessment & Plan:  - \"aching all over\" because she had to be off her anti-rheumatic drugs for 2 weeks in preparation for her R knee surgery - he    Sick sinus syndrome 12/27/2019    Sjogren's disease     Spondylolisthesis of lumbar region 01/17/2018    Syncope, recurrent 02/08/2021    Urinary tract infection        Past Surgical History:   Procedure Laterality Date    A-V CARDIAC PACEMAKER INSERTION  2016    ATRIAL CARDIAC PACEMAKER INSERTION      CARDIAC CATHETERIZATION      CATARACT EXTRACTION      CERVICAL CORPECTOMY N/A 3/3/2021    Procedure: CERVICAL 6 CORPECTOMY WITH TITANIUM CAGE WITH NEURO MONITORING;  Surgeon: Bandar Shea MD;  Location: [facility];  Service: Neurosurgery;  Laterality: N/A;    COLONOSCOPY  11/08/2011    One fold in the ascending colon which showed ulcer otherwise normal exam    COLONOSCOPY  11/12/2004    Normal exam repeat in five years    CORONARY ANGIOPLASTY WITH STENT PLACEMENT      X 2; 2013 & 2014    ENDOSCOPY  07/10/2014    Normal exam    FLAP LEG Right 9/14/2021    " Procedure: RIGHT GLUTEAL FASCIOCUTANEOUS ADVANCEMENT FLAP AND RIGHT TENSOR FASCIAL JESSICA FLAP;  Surgeon: Amadeo Turner MD;  Location:  [facility];  Service: Plastics;  Laterality: Right;    HIP ABDUCTION TENOTOMY BILATERAL Right 1/14/2021    Procedure: RIGHT HIP GLUTEUS MEDLUS / MINIMUS REPAIR, POSSIBLE ACHILLES ALLOGRAFT;  Surgeon: Nino Carlson MD;  Location:  [facility];  Service: Orthopedics;  Laterality: Right;    INCISION AND DRAINAGE ABSCESS Right 6/4/2022    Procedure: INCISION AND DRAINAGE ABSCESS right hip;  Surgeon: Magda Salcido MD;  Location:  [facility];  Service: General;  Laterality: Right;    INCISION AND DRAINAGE ABSCESS Right 6/10/2022    Procedure: RIGHT HIP INCISION AND DRAINAGE. MD NEEDS 3L VANC IRRIGATION, CURRETTES, DAICANS, KERLEX ROLLS;  Surgeon: Amadeo Turner MD;  Location:  [facility];  Service: Plastics;  Laterality: Right;    INCISION AND DRAINAGE HIP Right 2/9/2021    Procedure: HIP INCISION AND DRAINAGE;  Surgeon: Nino Carlson MD;  Location:  [facility];  Service: Orthopedics;  Laterality: Right;    INCISION AND DRAINAGE LEG Right 10/24/2021    Procedure: INCISION AND DRAINAGE LOWER EXTREMITY;  Surgeon: Amadeo Turner MD;  Location:  [facility];  Service: Plastics;  Laterality: Right;    INCISION AND DRAINAGE OF WOUND Right 7/8/2021    Procedure: INCISION AND DRAINAGE WOUND RIGHT HIP;  Surgeon: James Huntley MD;  Location:  [facility];  Service: Orthopedics;  Laterality: Right;    JOINT REPLACEMENT      KYPHOPLASTY WITH BIOPSY Bilateral 10/26/2021    Procedure: THOARCIC 12 KYPHOPLASTY WITH BIOPSY;  Surgeon: Bandar Shea MD;  Location:  [facility];  Service: Neurosurgery;  Laterality: Bilateral;    LEG DEBRIDEMENT Right 9/14/2021    Procedure: DEBRIDEMENT OF RIGHT HIP WOUND, RIGHT GLUTEAL FASCIOCUTANEOUS ADVANCEMENT FLAP AND RIGHT TENSOR FASCIAL JESSICA FLAP;  Surgeon: Amadeo Turner MD;  Location:  [facility];  Service: Plastics;  Laterality: Right;    LUMBAR  DISCECTOMY Right 3/23/2021    Procedure: LUMBAR DISCECTOMY MICRO, Lumbar 1/2 right;  Surgeon: Bandar Shea MD;  Location: [facility];  Service: Neurosurgery;  Laterality: Right;    LUMBAR FUSION N/A 1/19/2018    Procedure: L3-4,L4-5 DECOMPRESSION, POSTERIOR SPINAL FUSION WITH INSTRUMENTATION;  Surgeon: Fortino Oropeza MD;  Location:  [facility];  Service:     LUMBAR LAMINECTOMY WITH FUSION Left 1/17/2018    Procedure: LEFT L3-4 L4-5 LATERAL LUMBAR INTERBODY FUSION;  Surgeon: Fortino Oropeza MD;  Location:  [facility];  Service:     MYRINGOTOMY W/ TUBES  09/04/2014    TUBES NO LONGER IN PLACE    OTHER SURGICAL HISTORY      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 so hardware was removed and spacers were placed    REPLACEMENT TOTAL KNEE Right        Family History: family history includes Cancer in her mother; Heart disease in her father. Otherwise pertinent FHx was reviewed and not pertinent to current issue.    Social History:  reports that she has never smoked. She has never been exposed to tobacco smoke. She has never used smokeless tobacco. She reports that she does not drink alcohol and does not use drugs.    Home Medications:   DULoxetine, Diclofenac Sodium, Evolocumab, Menthol-Zinc Oxide, Vibegron, acetaminophen, apixaban, aspirin, carvedilol, ferrous sulfate, fluticasone, folic acid, furosemide, isosorbide mononitrate, leflunomide, levothyroxine, lidocaine, losartan, magnesium hydroxide, multivitamin with minerals, nitroglycerin, ondansetron, predniSONE, salsalate, sucralfate, traMADol, and valACYclovir    Allergies:  Allergies   Allergen Reactions    Atorvastatin Other (See Comments)     LEG CRAMPS      Amoxicillin Rash    Escitalopram Rash    Nabumetone Rash    Niacin Er Rash    Penicillin G Rash    Penicillins Rash    Simvastatin Rash       Objective    Objective     Vitals:  Temp:  [97.5 °F (36.4 °C)-98.9 °F (37.2 °C)] 97.5 °F (36.4 °C)  Heart Rate:  [67-77] 77  Resp:  [18] 18  BP: (116-134)/(48-73)  132/54  Flow (L/min):  [2] 2    Physical Exam  Constitutional:       Appearance: Normal appearance.      Comments: Pleasant elderly female, lying in bed, in no acute distress.  Normal development, obese body habitus. Well nourished.    HENT:      Head: Normocephalic and atraumatic.      Right Ear: External ear normal.      Left Ear: External ear normal.      Nose:      Comments: Nares patent, no septal deviation     Mouth/Throat:      Comments: Dentition intact, mucus membranes moist  Eyes:      Extraocular Movements: Extraocular movements intact.      Conjunctiva/sclera: Conjunctivae normal.      Pupils: Pupils are equal, round, and reactive to light.      Comments: External eyelids grossly normal, vision intact   Cardiovascular:      Rate and Rhythm: Normal rate and regular rhythm.      Comments: Normotensive, no JVD bilaterally  Pulmonary:      Effort: Pulmonary effort is normal.      Breath sounds: Normal breath sounds.   Musculoskeletal:      Cervical back: Normal range of motion.      Comments: For the right greater trochanter, there is a healing wound that is fibrotic and contracted without surrounding cellulitis or drainage and is minimally tender to palpation.  At the midportion of the right buttock, there is a 4 cm firm, well-circumscribed, freely mobile nodule that is tender to palpation without any overlying skin changes and no notable fluctuance.   Skin:     General: Skin is warm and dry.      Comments: Skin color is consistent with ethnicity   Neurological:      General: No focal deficit present.      Mental Status: She is alert and oriented to person, place, and time.   Psychiatric:         Mood and Affect: Mood normal.         Behavior: Behavior normal.         Thought Content: Thought content normal.         Judgment: Judgment normal.      Comments: Patient understands disease process and has decision making capacity.          Result Review    Result Review:  I have personally reviewed the results  from the time of this admission to 4/21/2024 14:49 CDT and agree with these findings:  [x]  Laboratory list / accordion  [x]  Microbiology  [x]  Radiology  []  EKG/Telemetry   []  Cardiology/Vascular   []  Pathology  [x]  Old records  []  Other:    Labs: Within normal limits    Imaging: CT of the pelvis showed scarring of the right over the greater trochanter which is consistent with a healing wound and a 3.5 cm solid nodule within the soft tissue of the right buttock at the level of the skin and subcutaneous plane without cellulitis      Assessment & Plan   Assessment / Plan     Brief Patient Summary:  Tawny Shin is a 68 y.o. female who was admitted for a UTI but also found to have a right buttock nodule.  She is on Eliquis for DVT.    Active Hospital Problems:  Active Hospital Problems    Diagnosis     **UTI (urinary tract infection)     Nodule of buttock     Stage 3b chronic kidney disease     E coli bacteremia      1.  Right buttock nodule  2.  History of anticoagulation  3.  Urinary tract infection    Plan:     I will order a soft tissue ultrasound to fully evaluate the lesion.  It appears to be a solid nodule on CT scan.  The patient would like to have this removed during this admission.  She had her last dose of Eliquis this morning, therefore it will be held for 48 hours.  Start prophylactic Lovenox tomorrow evening.  Okay for a diet for now. Plan for right buttock mass excision in the operating room on Tuesday morning.  N.p.o. Monday night.    Plan to continue for UTI however I do not believe that this is a buttock abscess therefore antibiotics are not required for this etiology.  We are appropriately.  Tailor antibiotics appropriately.    Moises Keyes MD

## 2024-04-21 NOTE — PROGRESS NOTES
[facility] Medicine Services  INPATIENT PROGRESS NOTE    Patient Name: Tawny Shin  Date of Admission: 4/19/2024  Today's Date: 04/21/24  Length of Stay: 2  Primary Care Physician: Moises Oliveira MD    Subjective   Chief Complaint:  right buttock knot, painful    HPI   Patient notes she is having pain right buttock.  There is a large golf ball sized knot toward the lateral lower buttock.    Repeat blood cultures negative to date.     Otherwise is feeling better.   No nausea.  Tolerating diet.   Mood good.     Review of Systems   All pertinent negatives and positives are as above. All other systems have been reviewed and are negative unless otherwise stated.     Objective    Temp:  [97.5 °F (36.4 °C)-98.9 °F (37.2 °C)] 97.5 °F (36.4 °C)  Heart Rate:  [67-82] 67  Resp:  [18] 18  BP: (116-138)/(48-73) 119/58  Physical Exam  Vitals and nursing note reviewed.   Constitutional:       Appearance: She is well-developed. She is obese.   HENT:      Head: Normocephalic and atraumatic.      Right Ear: External ear normal.      Left Ear: External ear normal.      Nose: Nose normal.      Mouth/Throat:      Mouth: Mucous membranes are moist.   Eyes:      Extraocular Movements: Extraocular movements intact.      Conjunctiva/sclera: Conjunctivae normal.      Pupils: Pupils are equal, round, and reactive to light.   Neck:      Thyroid: No thyromegaly.      Vascular: No JVD.      Trachea: No tracheal deviation.   Cardiovascular:      Rate and Rhythm: Normal rate and regular rhythm.      Pulses: Normal pulses.      Heart sounds: Normal heart sounds. No murmur heard.     No friction rub. No gallop.   Pulmonary:      Effort: Pulmonary effort is normal.      Breath sounds: Normal breath sounds.   Abdominal:      General: Bowel sounds are normal. There is no distension.      Palpations: Abdomen is soft.      Tenderness: There is no abdominal tenderness.   Musculoskeletal:         General:  Normal range of motion.      Cervical back: Normal range of motion and neck supple.      Comments: Right lateral buttock towards gluteal crease golf ball sized nodule.  Very ttp.    Lymphadenopathy:      Cervical: No cervical adenopathy.   Skin:     General: Skin is warm and dry.      Capillary Refill: Capillary refill takes less than 2 seconds.   Neurological:      Mental Status: She is alert and oriented to person, place, and time.      Cranial Nerves: No cranial nerve deficit.      Coordination: Coordination normal.   Psychiatric:         Mood and Affect: Mood normal.         Behavior: Behavior normal.             Results Review:  I have reviewed the labs, radiology results, and diagnostic studies.    Laboratory Data:   Results from last 7 days   Lab Units 04/21/24 0315 04/20/24 0226 04/19/24  0856   WBC 10*3/mm3 5.76 7.69 7.46   HEMOGLOBIN g/dL 8.7* 9.0* 10.6*   HEMATOCRIT % 28.6* 29.6* 33.9*   PLATELETS 10*3/mm3 126* 138* 163        Results from last 7 days   Lab Units 04/21/24 0315 04/20/24 2119 04/20/24 0226 04/19/24  0856   SODIUM mmol/L 139  --  141 141   POTASSIUM mmol/L 4.0 4.0 3.1* 2.9*   CHLORIDE mmol/L 105  --  102 101   CO2 mmol/L 24.0  --  29.0 30.0*   BUN mg/dL 27*  --  25* 23   CREATININE mg/dL 1.48*  --  1.55* 1.21*   CALCIUM mg/dL 8.1*  --  8.7 9.5   BILIRUBIN mg/dL  --   --  0.4 0.4   ALK PHOS U/L  --   --  58 70   ALT (SGPT) U/L  --   --  25 25   AST (SGOT) U/L  --   --  26 23   GLUCOSE mg/dL 93  --  116* 131*       Culture Data:   Blood Culture   Date Value Ref Range Status   04/19/2024 Abnormal Stain (C)  Preliminary   04/19/2024 Abnormal Stain (C)  Preliminary       Radiology Data:   Imaging Results (Last 24 Hours)       ** No results found for the last 24 hours. **            I have reviewed the patient's current medications.     Assessment/Plan   Assessment  Active Hospital Problems    Diagnosis     **UTI (urinary tract infection)     Nodule of buttock     Stage 3b chronic kidney  disease     E coli bacteremia        Treatment Plan  CT pelvis w/o contrast to evaluate buttock nodule.   Continue cefepime  Continue current medications  BMP and CBC in a.m.    Medical Decision Making  Number and Complexity of problems:   UTI moderate complexity  E. coli bacteremia high complexity  Differential Diagnosis:      Conditions and Status        Condition is unchanged.     [facility] Data  External documents reviewed: Reviewed  Cardiac tracing (EKG, telemetry) interpretation: Reviewed  Radiology interpretation: Reviewed   Labs reviewed: Reviewed  Any tests that were considered but not ordered: None     Decision rules/scores evaluated (example [id number], Wells, etc): None      Discussed with: Patient     Care Planning  Shared decision making: Patient  Code status and discussions: DNR/DNI     Disposition  Social Determinants of Health that impact treatment or disposition: None  Estimated length of stay is 2 to 3 days.     Electronically signed by Leta Julian DO, 04/21/24, 08:31 CDT.

## 2024-04-22 ENCOUNTER — APPOINTMENT (OUTPATIENT)
Dept: ULTRASOUND IMAGING | Facility: HOSPITAL | Age: 69
End: 2024-04-22
Payer: MEDICARE

## 2024-04-22 LAB
ALBUMIN SERPL-MCNC: 2.9 G/DL (ref 3.5–5.2)
ALBUMIN/GLOB SERPL: 0.8 G/DL
ALP SERPL-CCNC: 57 U/L (ref 39–117)
ALT SERPL W P-5'-P-CCNC: 23 U/L (ref 1–33)
ANION GAP SERPL CALCULATED.3IONS-SCNC: 10 MMOL/L (ref 5–15)
AST SERPL-CCNC: 27 U/L (ref 1–32)
BACTERIA SPEC AEROBE CULT: ABNORMAL
BACTERIA SPEC AEROBE CULT: ABNORMAL
BASOPHILS # BLD AUTO: 0.05 10*3/MM3 (ref 0–0.2)
BASOPHILS NFR BLD AUTO: 1 % (ref 0–1.5)
BILIRUB SERPL-MCNC: 0.2 MG/DL (ref 0–1.2)
BUN SERPL-MCNC: 23 MG/DL (ref 8–23)
BUN/CREAT SERPL: 17.4 (ref 7–25)
CALCIUM SPEC-SCNC: 8.3 MG/DL (ref 8.6–10.5)
CHLORIDE SERPL-SCNC: 106 MMOL/L (ref 98–107)
CO2 SERPL-SCNC: 23 MMOL/L (ref 22–29)
CREAT SERPL-MCNC: 1.32 MG/DL (ref 0.57–1)
DEPRECATED RDW RBC AUTO: 58.8 FL (ref 37–54)
EGFRCR SERPLBLD CKD-EPI 2021: 43.5 ML/MIN/1.73
EOSINOPHIL # BLD AUTO: 0.15 10*3/MM3 (ref 0–0.4)
EOSINOPHIL NFR BLD AUTO: 2.9 % (ref 0.3–6.2)
ERYTHROCYTE [DISTWIDTH] IN BLOOD BY AUTOMATED COUNT: 16.6 % (ref 12.3–15.4)
GLOBULIN UR ELPH-MCNC: 3.5 GM/DL
GLUCOSE SERPL-MCNC: 88 MG/DL (ref 65–99)
GRAM STN SPEC: ABNORMAL
HCT VFR BLD AUTO: 29.6 % (ref 34–46.6)
HGB BLD-MCNC: 9 G/DL (ref 12–15.9)
IMM GRANULOCYTES # BLD AUTO: 0.11 10*3/MM3 (ref 0–0.05)
IMM GRANULOCYTES NFR BLD AUTO: 2.1 % (ref 0–0.5)
ISOLATED FROM: ABNORMAL
ISOLATED FROM: ABNORMAL
LYMPHOCYTES # BLD AUTO: 1.7 10*3/MM3 (ref 0.7–3.1)
LYMPHOCYTES NFR BLD AUTO: 32.8 % (ref 19.6–45.3)
MCH RBC QN AUTO: 30 PG (ref 26.6–33)
MCHC RBC AUTO-ENTMCNC: 30.4 G/DL (ref 31.5–35.7)
MCV RBC AUTO: 98.7 FL (ref 79–97)
MONOCYTES # BLD AUTO: 0.63 10*3/MM3 (ref 0.1–0.9)
MONOCYTES NFR BLD AUTO: 12.2 % (ref 5–12)
MRSA DNA SPEC QL NAA+PROBE: ABNORMAL
NEUTROPHILS NFR BLD AUTO: 2.54 10*3/MM3 (ref 1.7–7)
NEUTROPHILS NFR BLD AUTO: 49 % (ref 42.7–76)
NRBC BLD AUTO-RTO: 0 /100 WBC (ref 0–0.2)
PLATELET # BLD AUTO: 167 10*3/MM3 (ref 140–450)
PMV BLD AUTO: 11.7 FL (ref 6–12)
POTASSIUM SERPL-SCNC: 4.4 MMOL/L (ref 3.5–5.2)
PROT SERPL-MCNC: 6.4 G/DL (ref 6–8.5)
RBC # BLD AUTO: 3 10*6/MM3 (ref 3.77–5.28)
SODIUM SERPL-SCNC: 139 MMOL/L (ref 136–145)
WBC NRBC COR # BLD AUTO: 5.18 10*3/MM3 (ref 3.4–10.8)

## 2024-04-22 PROCEDURE — 80053 COMPREHEN METABOLIC PANEL: CPT | Performed by: FAMILY MEDICINE

## 2024-04-22 PROCEDURE — 25010000002 CEFEPIME PER 500 MG: Performed by: FAMILY MEDICINE

## 2024-04-22 PROCEDURE — 97162 PT EVAL MOD COMPLEX 30 MIN: CPT

## 2024-04-22 PROCEDURE — 25010000002 CEFTRIAXONE PER 250 MG: Performed by: FAMILY MEDICINE

## 2024-04-22 PROCEDURE — 85025 COMPLETE CBC W/AUTO DIFF WBC: CPT | Performed by: FAMILY MEDICINE

## 2024-04-22 PROCEDURE — 99233 SBSQ HOSP IP/OBS HIGH 50: CPT | Performed by: SURGERY

## 2024-04-22 PROCEDURE — 76882 US LMTD JT/FCL EVL NVASC XTR: CPT

## 2024-04-22 PROCEDURE — 63710000001 PREDNISONE PER 5 MG: Performed by: FAMILY MEDICINE

## 2024-04-22 PROCEDURE — 93005 ELECTROCARDIOGRAM TRACING: CPT | Performed by: SURGERY

## 2024-04-22 PROCEDURE — 87641 MR-STAPH DNA AMP PROBE: CPT | Performed by: SURGERY

## 2024-04-22 PROCEDURE — 97116 GAIT TRAINING THERAPY: CPT

## 2024-04-22 RX ORDER — SALSALATE 500 MG/1
2500 TABLET, FILM COATED ORAL 3 TIMES WEEKLY
Status: ON HOLD | COMMUNITY
End: 2024-04-22

## 2024-04-22 RX ORDER — SALSALATE 500 MG/1
2000 TABLET, FILM COATED ORAL 3 TIMES WEEKLY
Status: ON HOLD | COMMUNITY
End: 2024-04-22

## 2024-04-22 RX ORDER — ENOXAPARIN SODIUM 100 MG/ML
40 INJECTION SUBCUTANEOUS NIGHTLY
Status: DISCONTINUED | OUTPATIENT
Start: 2024-04-22 | End: 2024-04-23 | Stop reason: HOSPADM

## 2024-04-22 RX ADMIN — FUROSEMIDE 40 MG: 40 TABLET ORAL at 08:35

## 2024-04-22 RX ADMIN — Medication 1 TABLET: at 08:35

## 2024-04-22 RX ADMIN — CEFEPIME 2000 MG: 2 INJECTION, POWDER, FOR SOLUTION INTRAVENOUS at 05:38

## 2024-04-22 RX ADMIN — DULOXETINE HYDROCHLORIDE 60 MG: 30 CAPSULE, DELAYED RELEASE ORAL at 08:35

## 2024-04-22 RX ADMIN — FOLIC ACID 1000 MCG: 1 TABLET ORAL at 08:35

## 2024-04-22 RX ADMIN — TRAMADOL HYDROCHLORIDE 50 MG: 50 TABLET, COATED ORAL at 17:03

## 2024-04-22 RX ADMIN — SUCRALFATE 1 G: 1 TABLET ORAL at 08:35

## 2024-04-22 RX ADMIN — PREDNISONE 5 MG: 5 TABLET ORAL at 08:35

## 2024-04-22 RX ADMIN — FLUTICASONE PROPIONATE 1 SPRAY: 50 SPRAY, METERED NASAL at 08:37

## 2024-04-22 RX ADMIN — CARVEDILOL 25 MG: 25 TABLET, FILM COATED ORAL at 17:03

## 2024-04-22 RX ADMIN — ISOSORBIDE MONONITRATE 60 MG: 60 TABLET, EXTENDED RELEASE ORAL at 08:35

## 2024-04-22 RX ADMIN — ASPIRIN 81 MG: 81 TABLET, COATED ORAL at 08:35

## 2024-04-22 RX ADMIN — Medication: at 08:36

## 2024-04-22 RX ADMIN — Medication: at 21:19

## 2024-04-22 RX ADMIN — LEFLUNOMIDE 20 MG: 20 TABLET ORAL at 08:35

## 2024-04-22 RX ADMIN — TRAMADOL HYDROCHLORIDE 50 MG: 50 TABLET, COATED ORAL at 05:38

## 2024-04-22 RX ADMIN — VALACYCLOVIR 500 MG: 500 TABLET, FILM COATED ORAL at 08:36

## 2024-04-22 RX ADMIN — CARVEDILOL 25 MG: 25 TABLET, FILM COATED ORAL at 08:36

## 2024-04-22 RX ADMIN — CEFTRIAXONE 1000 MG: 1 INJECTION, POWDER, FOR SOLUTION INTRAMUSCULAR; INTRAVENOUS at 13:30

## 2024-04-22 RX ADMIN — LOSARTAN POTASSIUM 50 MG: 50 TABLET, FILM COATED ORAL at 08:35

## 2024-04-22 RX ADMIN — LEVOTHYROXINE SODIUM 75 MCG: 75 TABLET ORAL at 05:37

## 2024-04-22 RX ADMIN — FERROUS SULFATE TAB 325 MG (65 MG ELEMENTAL FE) 325 MG: 325 (65 FE) TAB at 08:35

## 2024-04-22 NOTE — PROGRESS NOTES
GENERAL SURGERY INPATIENT PROGRESS NOTE    Patient Name:  Tawny Shin  YOB: 1955  MRN: [id number]    SUBJECTIVE  No unexpected events overnight.  The patient reports that she is feeling about the same.  She is voiding without dysuria.  Tolerating a diet    Allergies:  Allergies   Allergen Reactions    Atorvastatin Other (See Comments)     LEG CRAMPS      Amoxicillin Rash    Escitalopram Rash    Nabumetone Rash    Niacin Er Rash    Penicillin G Rash    Penicillins Rash    Simvastatin Rash       Medications:  [Held by provider] apixaban, 5 mg, Oral, BID  aspirin, 81 mg, Oral, Daily  carvedilol, 25 mg, Oral, BID With Meals  cefTRIAXone, 1,000 mg, Intravenous, Q24H  DULoxetine, 60 mg, Oral, Daily  enoxaparin, 40 mg, Subcutaneous, Nightly  ferrous sulfate, 325 mg, Oral, Daily With Breakfast  folic acid, 1,000 mcg, Oral, Daily  furosemide, 40 mg, Oral, Daily  isosorbide mononitrate, 60 mg, Oral, QAM  leflunomide, 20 mg, Oral, Daily  losartan, 50 mg, Oral, Daily  Menthol-Zinc Oxide, , Topical, BID  multivitamin with minerals, 1 tablet, Oral, Daily  predniSONE, 5 mg, Oral, Daily  valACYclovir, 500 mg, Oral, Daily         No current facility-administered medications on file prior to encounter.     Current Outpatient Medications on File Prior to Encounter   Medication Sig    apixaban (ELIQUIS) 5 MG tablet tablet Take 1 tablet by mouth 2 (Two) Times a Day.    aspirin 81 MG EC tablet Take 1 tablet by mouth Daily. 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    Diclofenac Sodium (VOLTAREN) 1 % gel gel Apply 4 g topically to the appropriate area as directed 4 (Four) Times a Day As Needed (Mild pain). Indications: Joint Damage causing Pain and Loss of Function    DULoxetine (CYMBALTA) 60 MG capsule Take 1 capsule by mouth Daily.    ferrous sulfate 324 (65 Fe) MG tablet delayed-release EC tablet Take 1  tablet by mouth Daily With Breakfast.    folic acid (FOLVITE) 1 MG tablet Take 1 tablet by mouth Daily.    furosemide (LASIX) 40 MG tablet Take 1 tablet by mouth Daily.    isosorbide mononitrate (IMDUR) 60 MG 24 hr tablet Take 1 tablet by mouth Daily.    leflunomide (ARAVA) 20 MG tablet Take 1 tablet by mouth every night at bedtime. Indications: Rheumatoid Arthritis    lidocaine (Lidoderm) 5 % Place 1 patch on the skin as directed by provider Daily As Needed for Mild Pain. Remove & Discard patch within 12 hours or as directed by MD    losartan (COZAAR) 50 MG tablet Take 1 tablet by mouth Daily.    multivitamin with minerals tablet tablet Take 1 tablet by mouth Daily.    ondansetron (Zofran) 4 MG tablet Take 1 tablet by mouth Every 8 (Eight) Hours As Needed for Nausea or Vomiting.    predniSONE (DELTASONE) 5 MG tablet Take 1 tablet by mouth Daily.    valACYclovir (VALTREX) 500 MG tablet Take 1 tablet by mouth Daily As Needed.    [DISCONTINUED] salsalate (DISALCID) 500 MG tablet Take 4 tablets by mouth 3 (Three) Times a Week. Tuesday, Thursday and Saturday.    acetaminophen (TYLENOL) 325 MG tablet Take 2 tablets by mouth Every 6 (Six) Hours As Needed for Mild Pain (mild pain). Indications: Fever, Pain    magnesium hydroxide (MILK OF MAGNESIA) 400 MG/5ML suspension Take 30 mL by mouth Daily As Needed for Constipation. Indications: Constipation    nitroglycerin (NITROSTAT) 0.4 MG SL tablet Place 1 tablet under the tongue Every 5 (Five) Minutes As Needed for Chest Pain. Take no more than 3 doses in 15 minutes.    [DISCONTINUED] Evolocumab (REPATHA) solution auto-injector SureClick injection Inject 1 mL under the skin into the appropriate area as directed Every 14 (Fourteen) Days. (Patient not taking: Reported on 4/22/2024)    [DISCONTINUED] fluticasone (FLONASE) 50 MCG/ACT nasal spray 1 spray into the nostril(s) as directed by provider Daily. (Patient not taking: Reported on 4/21/2024)    [DISCONTINUED] levothyroxine  "(SYNTHROID, LEVOTHROID) 75 MCG tablet TAKE ONE TABLET BY MOUTH DAILY (Patient not taking: Reported on 4/21/2024)    [DISCONTINUED] Menthol-Zinc Oxide 0.45-20 % ointment Apply 1 application  topically 2 (Two) Times a Day. Apply to right buttock (Patient not taking: Reported on 4/21/2024)    [DISCONTINUED] salsalate (DISALCID) 500 MG tablet TAKE 5 TABLETS BY MOUTH ON MONDAY, WEDNESDAY, AND FRIDAY AND TAKE 4 TABLETS BY MOUTH ON TUESDAY, THURSDAY, AND SATURDAY    [DISCONTINUED] salsalate (DISALCID) 500 MG tablet Take 5 tablets by mouth 3 (Three) Times a Week. Monday, Wednesday and Friday.    [DISCONTINUED] sucralfate (CARAFATE) 1 g tablet TAKE ONE TABLET BY MOUTH FOUR TIMES A DAY BEFORE MEALS AND AT BEDTIME (Patient not taking: Reported on 4/21/2024)    [DISCONTINUED] traMADol (ULTRAM) 50 MG tablet Take 1 tablet by mouth Every 6 (Six) Hours As Needed for Moderate Pain. (Patient not taking: Reported on 4/21/2024)    [DISCONTINUED] Vibegron 75 MG tablet Take 1 tablet by mouth Daily. (Patient not taking: Reported on 4/22/2024)       OBJECTIVE    VITAL SIGNS  /51 (BP Location: Left arm, Patient Position: Lying)   Pulse 71   Temp 97.3 °F (36.3 °C) (Oral)   Resp 18   Ht 170.2 cm (67\")   Wt 99.9 kg (220 lb 4.8 oz)   LMP  (LMP Unknown)   SpO2 94%   BMI 34.50 kg/m²     Intake/Output Summary (Last 24 hours) at 4/22/2024 1349  Last data filed at 4/22/2024 0500  Gross per 24 hour   Intake --   Output 1900 ml   Net -1900 ml       PHYSICAL EXAM    General: Pleasant elderly female, lying in bed, in no acute distress.  HEENT:  Nares patent  Neck:  Full ROM  Heart:  Regular rate, normotensive, no JVD bilaterally  Lungs:  Normal respiratory effort, regular respiratory rate, no wheezing  Extremities:  No cyanosis, clubbing or edema.   Musculoskeletal:  Normal development of bilateral upper extremities. Normal development of bilateral lower extremities.  Range of motion intact.  No evidence of trauma.  Psychiatric:  Alert and " oriented to person, place, time and situation.  Normal mood, normal affect.  The patient understands their disease process and has decision-making capacity.  Skin:  No rashes, ecchymosis or jaundice. Dry and non-diaphoretic. Skin color is consistent with ethnicity.    RESULTS    Labs:  All pertinent labs have been personally reviewed.    Imaging:  All pertinent imaging have been personally reviewed.  US pending    ASSESSMENT AND PLAN:    68 y.o. female with    Medical Problems       Hospital Problem List       * (Principal) UTI (urinary tract infection)    E coli bacteremia    Nodule of buttock    Stage 3b chronic kidney disease                DAILY CARE PLAN:    N.p.o. after midnight.  Starting prophylactic Lovenox today.  Should be able to resume therapeutic anticoagulation on Wednesday after her procedure as long as no unexpected bleeding.  Possible discharge home tomorrow afternoon.    Plan for excision of right buttock mass tomorrow in the operating room.     I discussed the risks, benefits and alternatives to mass excision including bleeding, scarring, pain, wound infection, wound dehiscence, reactions to medications, and the possibility of requiring future operations and/or procedures. Questions were answered. The patient understood these risks and consented for surgery.     I discussed the patient's findings and my recommendations with the patient/family, as well as the primary care team.    Moises Keyes MD, FACS  General Surgery  4/22/2024  13:49 CDT

## 2024-04-22 NOTE — PLAN OF CARE
Problem: Adult Inpatient Plan of Care  Goal: Plan of Care Review  Outcome: Ongoing, Progressing  Goal: Patient-Specific Goal (Individualized)  Outcome: Ongoing, Progressing  Goal: Absence of Hospital-Acquired Illness or Injury  Outcome: Ongoing, Progressing  Intervention: Identify and Manage Fall Risk  Recent Flowsheet Documentation  Taken 4/22/2024 0400 by Wanda Aguirre RN  Safety Promotion/Fall Prevention: safety round/check completed  Taken 4/22/2024 0200 by Wanda Aguirre RN  Safety Promotion/Fall Prevention: safety round/check completed  Taken 4/22/2024 0000 by Wanda Aguirre RN  Safety Promotion/Fall Prevention: safety round/check completed  Taken 4/21/2024 2200 by Wanda Aguirre RN  Safety Promotion/Fall Prevention: safety round/check completed  Taken 4/21/2024 2100 by Wanda Aguirre RN  Safety Promotion/Fall Prevention: safety round/check completed  Taken 4/21/2024 2000 by Wanda Aguirre RN  Safety Promotion/Fall Prevention: safety round/check completed  Taken 4/21/2024 1900 by Wanda Aguirre RN  Safety Promotion/Fall Prevention: safety round/check completed  Goal: Optimal Comfort and Wellbeing  Outcome: Ongoing, Progressing  Goal: Readiness for Transition of Care  Outcome: Ongoing, Progressing     Problem: Skin Injury Risk Increased  Goal: Skin Health and Integrity  Outcome: Ongoing, Progressing     Problem: Diabetes Comorbidity  Goal: Blood Glucose Level Within Targeted Range  Outcome: Ongoing, Progressing     Problem: Heart Failure Comorbidity  Goal: Maintenance of Heart Failure Symptom Control  Outcome: Ongoing, Progressing     Problem: Hypertension Comorbidity  Goal: Blood Pressure in Desired Range  Outcome: Ongoing, Progressing     Problem: Osteoarthritis Comorbidity  Goal: Maintenance of Osteoarthritis Symptom Control  Outcome: Ongoing, Progressing     Problem: Pain Chronic (Persistent) (Comorbidity Management)  Goal: Acceptable Pain Control and Functional  Ability  Outcome: Ongoing, Progressing     Problem: Fall Injury Risk  Goal: Absence of Fall and Fall-Related Injury  Outcome: Ongoing, Progressing  Intervention: Promote Injury-Free Environment  Recent Flowsheet Documentation  Taken 4/22/2024 0400 by Wanda Aguirre RN  Safety Promotion/Fall Prevention: safety round/check completed  Taken 4/22/2024 0200 by Wanda Aguirre RN  Safety Promotion/Fall Prevention: safety round/check completed  Taken 4/22/2024 0000 by Wanda Aguirre RN  Safety Promotion/Fall Prevention: safety round/check completed  Taken 4/21/2024 2200 by Wanda Aguirre RN  Safety Promotion/Fall Prevention: safety round/check completed  Taken 4/21/2024 2100 by Wanda Aguirre RN  Safety Promotion/Fall Prevention: safety round/check completed  Taken 4/21/2024 2000 by Wanda Aguirre RN  Safety Promotion/Fall Prevention: safety round/check completed  Taken 4/21/2024 1900 by Wanda Aguirre RN  Safety Promotion/Fall Prevention: safety round/check completed   Goal Outcome Evaluation:

## 2024-04-22 NOTE — PROGRESS NOTES
[facility] Medicine Services  INPATIENT PROGRESS NOTE    Patient Name: Tawny Shin  Date of Admission: 4/19/2024  Today's Date: 04/22/24  Length of Stay: 3  Primary Care Physician: Moises Oliveira MD    Subjective   Chief Complaint: Pain and a lump right buttock  HPI     Patient has been seen by general surgery.  Plan is for excision of mass of right buttock tomorrow in the OR.  I anticipate that the patient should be able to discharge later tomorrow after she returns from surgery unless pain is prohibitive.  Patient's repeat blood cultures are negative.  E. coli is pansensitive.  Antibiotics will be de-escalated to Rocephin 1 g IV every 24 hours.  Anticipate discharge on oral Omnicef.  A total of 10 days therapy should be sufficient.    Review of Systems   All pertinent negatives and positives are as above. All other systems have been reviewed and are negative unless otherwise stated.     Objective    Temp:  [97.3 °F (36.3 °C)-98.3 °F (36.8 °C)] 98.3 °F (36.8 °C)  Heart Rate:  [69-77] 77  Resp:  [18] 18  BP: (114-138)/(51-98) 123/64  Physical Exam  Constitutional:       Appearance: She is well-developed. She is obese.   HENT:      Head: Normocephalic and atraumatic.      Right Ear: External ear normal.      Left Ear: External ear normal.      Nose: Nose normal.      Mouth: Mucous membranes are moist.   Eyes:      Extraocular Movements: Extraocular movements intact.      Conjunctiva/sclera: Conjunctivae normal.   Cardiovascular:      Rate and Rhythm: Normal rate and regular rhythm.      Pulses: Normal pulses.      Heart sounds: Normal heart sounds. No murmur heard.  Pulmonary:      Effort: Pulmonary effort is normal.      Breath sounds: Normal breath sounds.   Abdominal:      General: Bowel sounds are normal. There is no distension.      Palpations: Abdomen is soft.      Tenderness: There is no abdominal tenderness.   Musculoskeletal:         General: Normal range of  motion.   Skin:     General: Skin is warm and dry.   Neurological:      Mental Status: She is alert and oriented to person, place, and time.      Coordination: Coordination normal.   Psychiatric:         Mood and Affect: Mood normal.         Behavior: Behavior normal.     Results Review:  I have reviewed the labs, radiology results, and diagnostic studies.    Laboratory Data:   Results from last 7 days   Lab Units 04/22/24  0504 04/21/24 0315 04/20/24  0226   WBC 10*3/mm3 5.18 5.76 7.69   HEMOGLOBIN g/dL 9.0* 8.7* 9.0*   HEMATOCRIT % 29.6* 28.6* 29.6*   PLATELETS 10*3/mm3 167 126* 138*        Results from last 7 days   Lab Units 04/22/24  0504 04/21/24  0315 04/20/24 2119 04/20/24 0226 04/19/24  0856   SODIUM mmol/L 139 139  --  141 141   POTASSIUM mmol/L 4.4 4.0 4.0 3.1* 2.9*   CHLORIDE mmol/L 106 105  --  102 101   CO2 mmol/L 23.0 24.0  --  29.0 30.0*   BUN mg/dL 23 27*  --  25* 23   CREATININE mg/dL 1.32* 1.48*  --  1.55* 1.21*   CALCIUM mg/dL 8.3* 8.1*  --  8.7 9.5   BILIRUBIN mg/dL 0.2  --   --  0.4 0.4   ALK PHOS U/L 57  --   --  58 70   ALT (SGPT) U/L 23  --   --  25 25   AST (SGOT) U/L 27  --   --  26 23   GLUCOSE mg/dL 88 93  --  116* 131*       Culture Data:   Blood Culture   Date Value Ref Range Status   04/20/2024 No growth at 24 hours  Preliminary   04/19/2024 Escherichia coli (C)  Final   04/19/2024 Escherichia coli (C)  Final     Urine Culture   Date Value Ref Range Status   04/19/2024 >100,000 CFU/mL Mixed Selina Isolated  Final       Radiology Data:   Imaging Results (Last 24 Hours)       Procedure Component Value Units Date/Time    US Nonvascular Extremity Limited [[id number]] Collected: 04/22/24 1343     Updated: 04/22/24 1350    Narrative:      EXAMINATION: US NONVASCULAR EXTREMITY LIMITED-  4/22/2024 1:43 PM     HISTORY: Right buttock nodule.     FINDINGS: Sonography is performed over the right buttock in the area of  palpable abnormality. This corresponds to a heterogeneous  predominantly  cystic mass measuring 3.1 x 2.1 x 3.2 cm in size with good through  transmission. Along its more anterior medial margin there does appear to  be some contiguity with the skin. Differential would include a organized  hematoma, a dermatologic lesion such as a sebaceous cyst or even  abscess. Correlation is recommended to the appearance and testing of the  fluid which drains from this lesion. A neoplastic process is considered  unlikely.       Impression:      1. Heterogeneous predominantly cystic mass within the subcutaneous  tissues overlying the right buttock with differential as above. Given  its complexity and the overlying redness I suspect this may represent  infected fluid either representing an infected dermatologic lesion such  as a sebaceous cyst or abscess.     This report was signed and finalized on 4/22/2024 1:47 PM by Dr. Neymar Calderon MD.               I have reviewed the patient's current medications.     Assessment/Plan   Assessment  Active Hospital Problems    Diagnosis     **UTI (urinary tract infection)     Nodule of buttock     Stage 3b chronic kidney disease     E coli bacteremia        Treatment Plan  Discontinue IV cefepime  Rocephin 1 g IV every 24 hours  Likely discharge tomorrow after excision of gluteal mass    Medical Decision Making  Number and Complexity of problems:   UTI moderate complexity  E. coli bacteremia high complexity  Differential Diagnosis:      Conditions and Status        Condition is unchanged.     MDM Data  External documents reviewed: Reviewed  Cardiac tracing (EKG, telemetry) interpretation: Reviewed  Radiology interpretation: Reviewed   Labs reviewed: Reviewed  Any tests that were considered but not ordered: None     Decision rules/scores evaluated (example [id number], Wells, etc): None      Discussed with: Patient     Care Planning  Shared decision making: Patient  Code status and discussions: DNR/DNI     Disposition  Social Determinants of Health  that impact treatment or disposition: None  Estimated length of stay is 1 days.     Electronically signed by Nayan Hale DO, 04/22/24, 18:26 CDT.

## 2024-04-22 NOTE — PLAN OF CARE
Goal Outcome Evaluation:  Plan of Care Reviewed With: patient, caregiver, sibling        Progress: no change  Outcome Evaluation: PT eval completed.  Pt pleasant and agreeable to therapy oriented X 4.  Pt reports she has been in the bed since Friday.  Pt performed rolling L/R in order to complete hygiene requiring CGA for rolling w/ use of bedrails.  Pt requires max assist to scoot up in bed.  Supine to/from sit w/ CGA and bedrails w/ increase time and effort.  Tfer sit to/from stand w/ min to CGA w/ increase effort.  Pt performed gait ~ 5 ft forward walking w/ rwx w/ min to CGA w/ noted flexed posture, increase CARMELO, slowed speed, decrease knee extension on R w/ pt toe walking on R.  Pt required seated rest and then reported increase c/os dizziness.  Pt education for LE exercise in sitting w/ some improvements reported.  Pt performed side stepping to L w/ rwx and CGA 5 ft a 2nd time w/ min to CGA w/ same noted gait deficits.  Pt returned to bed and assist for position of comfort.  Pt will benefit from continued PT services to improve functional mobility I, balance, activity tolerance, to assist w/ ROM and strength gains, to improve safety awareness and reduce fall risk.  PT to follow for progress and needs. Discharge plans pending progress.  Pt is anticipating possible surgery.      Anticipated Discharge Disposition (PT): other (see comments) (pending progress)

## 2024-04-22 NOTE — PLAN OF CARE
Goal Outcome Evaluation:            Pt is A&Ox4 and afebrile. Vascular access came and put a new IV in her right upper arm. Pain controlled with PRN pain meds. She has a purewick. She will be NPO at midnight for her surgery tomorrow. She had a bath today. No other complaints.

## 2024-04-22 NOTE — H&P (VIEW-ONLY)
GENERAL SURGERY INPATIENT PROGRESS NOTE    Patient Name:  Tawny Shin  YOB: 1955  MRN: [id number]    SUBJECTIVE  No unexpected events overnight.  The patient reports that she is feeling about the same.  She is voiding without dysuria.  Tolerating a diet    Allergies:  Allergies   Allergen Reactions    Atorvastatin Other (See Comments)     LEG CRAMPS      Amoxicillin Rash    Escitalopram Rash    Nabumetone Rash    Niacin Er Rash    Penicillin G Rash    Penicillins Rash    Simvastatin Rash       Medications:  [Held by provider] apixaban, 5 mg, Oral, BID  aspirin, 81 mg, Oral, Daily  carvedilol, 25 mg, Oral, BID With Meals  cefTRIAXone, 1,000 mg, Intravenous, Q24H  DULoxetine, 60 mg, Oral, Daily  enoxaparin, 40 mg, Subcutaneous, Nightly  ferrous sulfate, 325 mg, Oral, Daily With Breakfast  folic acid, 1,000 mcg, Oral, Daily  furosemide, 40 mg, Oral, Daily  isosorbide mononitrate, 60 mg, Oral, QAM  leflunomide, 20 mg, Oral, Daily  losartan, 50 mg, Oral, Daily  Menthol-Zinc Oxide, , Topical, BID  multivitamin with minerals, 1 tablet, Oral, Daily  predniSONE, 5 mg, Oral, Daily  valACYclovir, 500 mg, Oral, Daily         No current facility-administered medications on file prior to encounter.     Current Outpatient Medications on File Prior to Encounter   Medication Sig    apixaban (ELIQUIS) 5 MG tablet tablet Take 1 tablet by mouth 2 (Two) Times a Day.    aspirin 81 MG EC tablet Take 1 tablet by mouth Daily. 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    Diclofenac Sodium (VOLTAREN) 1 % gel gel Apply 4 g topically to the appropriate area as directed 4 (Four) Times a Day As Needed (Mild pain). Indications: Joint Damage causing Pain and Loss of Function    DULoxetine (CYMBALTA) 60 MG capsule Take 1 capsule by mouth Daily.    ferrous sulfate 324 (65 Fe) MG tablet delayed-release EC tablet Take 1  tablet by mouth Daily With Breakfast.    folic acid (FOLVITE) 1 MG tablet Take 1 tablet by mouth Daily.    furosemide (LASIX) 40 MG tablet Take 1 tablet by mouth Daily.    isosorbide mononitrate (IMDUR) 60 MG 24 hr tablet Take 1 tablet by mouth Daily.    leflunomide (ARAVA) 20 MG tablet Take 1 tablet by mouth every night at bedtime. Indications: Rheumatoid Arthritis    lidocaine (Lidoderm) 5 % Place 1 patch on the skin as directed by provider Daily As Needed for Mild Pain. Remove & Discard patch within 12 hours or as directed by MD    losartan (COZAAR) 50 MG tablet Take 1 tablet by mouth Daily.    multivitamin with minerals tablet tablet Take 1 tablet by mouth Daily.    ondansetron (Zofran) 4 MG tablet Take 1 tablet by mouth Every 8 (Eight) Hours As Needed for Nausea or Vomiting.    predniSONE (DELTASONE) 5 MG tablet Take 1 tablet by mouth Daily.    valACYclovir (VALTREX) 500 MG tablet Take 1 tablet by mouth Daily As Needed.    [DISCONTINUED] salsalate (DISALCID) 500 MG tablet Take 4 tablets by mouth 3 (Three) Times a Week. Tuesday, Thursday and Saturday.    acetaminophen (TYLENOL) 325 MG tablet Take 2 tablets by mouth Every 6 (Six) Hours As Needed for Mild Pain (mild pain). Indications: Fever, Pain    magnesium hydroxide (MILK OF MAGNESIA) 400 MG/5ML suspension Take 30 mL by mouth Daily As Needed for Constipation. Indications: Constipation    nitroglycerin (NITROSTAT) 0.4 MG SL tablet Place 1 tablet under the tongue Every 5 (Five) Minutes As Needed for Chest Pain. Take no more than 3 doses in 15 minutes.    [DISCONTINUED] Evolocumab (REPATHA) solution auto-injector SureClick injection Inject 1 mL under the skin into the appropriate area as directed Every 14 (Fourteen) Days. (Patient not taking: Reported on 4/22/2024)    [DISCONTINUED] fluticasone (FLONASE) 50 MCG/ACT nasal spray 1 spray into the nostril(s) as directed by provider Daily. (Patient not taking: Reported on 4/21/2024)    [DISCONTINUED] levothyroxine  "(SYNTHROID, LEVOTHROID) 75 MCG tablet TAKE ONE TABLET BY MOUTH DAILY (Patient not taking: Reported on 4/21/2024)    [DISCONTINUED] Menthol-Zinc Oxide 0.45-20 % ointment Apply 1 application  topically 2 (Two) Times a Day. Apply to right buttock (Patient not taking: Reported on 4/21/2024)    [DISCONTINUED] salsalate (DISALCID) 500 MG tablet TAKE 5 TABLETS BY MOUTH ON MONDAY, WEDNESDAY, AND FRIDAY AND TAKE 4 TABLETS BY MOUTH ON TUESDAY, THURSDAY, AND SATURDAY    [DISCONTINUED] salsalate (DISALCID) 500 MG tablet Take 5 tablets by mouth 3 (Three) Times a Week. Monday, Wednesday and Friday.    [DISCONTINUED] sucralfate (CARAFATE) 1 g tablet TAKE ONE TABLET BY MOUTH FOUR TIMES A DAY BEFORE MEALS AND AT BEDTIME (Patient not taking: Reported on 4/21/2024)    [DISCONTINUED] traMADol (ULTRAM) 50 MG tablet Take 1 tablet by mouth Every 6 (Six) Hours As Needed for Moderate Pain. (Patient not taking: Reported on 4/21/2024)    [DISCONTINUED] Vibegron 75 MG tablet Take 1 tablet by mouth Daily. (Patient not taking: Reported on 4/22/2024)       OBJECTIVE    VITAL SIGNS  /51 (BP Location: Left arm, Patient Position: Lying)   Pulse 71   Temp 97.3 °F (36.3 °C) (Oral)   Resp 18   Ht 170.2 cm (67\")   Wt 99.9 kg (220 lb 4.8 oz)   LMP  (LMP Unknown)   SpO2 94%   BMI 34.50 kg/m²     Intake/Output Summary (Last 24 hours) at 4/22/2024 1349  Last data filed at 4/22/2024 0500  Gross per 24 hour   Intake --   Output 1900 ml   Net -1900 ml       PHYSICAL EXAM    General: Pleasant elderly female, lying in bed, in no acute distress.  HEENT:  Nares patent  Neck:  Full ROM  Heart:  Regular rate, normotensive, no JVD bilaterally  Lungs:  Normal respiratory effort, regular respiratory rate, no wheezing  Extremities:  No cyanosis, clubbing or edema.   Musculoskeletal:  Normal development of bilateral upper extremities. Normal development of bilateral lower extremities.  Range of motion intact.  No evidence of trauma.  Psychiatric:  Alert and " oriented to person, place, time and situation.  Normal mood, normal affect.  The patient understands their disease process and has decision-making capacity.  Skin:  No rashes, ecchymosis or jaundice. Dry and non-diaphoretic. Skin color is consistent with ethnicity.    RESULTS    Labs:  All pertinent labs have been personally reviewed.    Imaging:  All pertinent imaging have been personally reviewed.  US pending    ASSESSMENT AND PLAN:    68 y.o. female with    Medical Problems       Hospital Problem List       * (Principal) UTI (urinary tract infection)    E coli bacteremia    Nodule of buttock    Stage 3b chronic kidney disease                DAILY CARE PLAN:    N.p.o. after midnight.  Starting prophylactic Lovenox today.  Should be able to resume therapeutic anticoagulation on Wednesday after her procedure as long as no unexpected bleeding.  Possible discharge home tomorrow afternoon.    Plan for excision of right buttock mass tomorrow in the operating room.     I discussed the risks, benefits and alternatives to mass excision including bleeding, scarring, pain, wound infection, wound dehiscence, reactions to medications, and the possibility of requiring future operations and/or procedures. Questions were answered. The patient understood these risks and consented for surgery.     I discussed the patient's findings and my recommendations with the patient/family, as well as the primary care team.    Moises Keyes MD, FACS  General Surgery  4/22/2024  13:49 CDT

## 2024-04-22 NOTE — THERAPY EVALUATION
Patient Name: Tawny Shin  : 1955    MRN: [id number]                              Today's Date: 2024       Admit Date: 2024    Visit Dx:     ICD-10-CM ICD-9-CM   1. Acute UTI  N39.0 599.0   2. Fever, unspecified fever cause  R50.9 780.60   3. Chronic renal impairment, unspecified CKD stage  N18.9 585.9   4. Hypokalemia  E87.6 276.8   5. Cellulitis of right leg  L03.115 682.6   6. Calculus of gallbladder without cholecystitis without obstruction  K80.20 574.20   7. Nodule of buttock  R22.2 782.2   8. Impaired mobility [Z74.09]  Z74.09 799.89     Patient Active Problem List   Diagnosis    T12 compression fracture, initial encounter    Chronic embolism and thrombosis of unspecified deep veins of left lower extremity    Chronic anticoagulation    Iron deficiency anemia    Osteoporosis    E coli bacteremia    Epidural hematoma    Pleural effusion, left    Functional neurological symptom disorder with weakness or paralysis    Overweight (BMI 25.0-29.9)    Rheumatoid arthritis involving multiple sites    (HFpEF) heart failure with preserved ejection fraction    Venous insufficiency    Coronary artery disease involving native coronary artery of native heart without angina pectoris    Sick sinus syndrome    Essential hypertension    Pressure injury of skin of heel    Chronic pain    Anemia of chronic disease    Cholelithiasis    Pressure injury of skin of buttock    Near functional paraplegia    Skin cancer    Squamous cell carcinoma of back    Hematoma    Right-sided chest wall pain    Hyperlipidemia LDL goal <70    Malodorous urine    UTI (urinary tract infection)    Nodule of buttock    Stage 3b chronic kidney disease     Past Medical History:   Diagnosis Date    Age-related osteoporosis with current pathological fracture 2020    Arthritis     Asthma     Bilateral bunions 2020    Cancer     Cardiac pacemaker syndrome 2020    Overview:  - heart block - implanted     Charcot's  "joint of foot, left 12/23/2020    Chronic deep vein thrombosis (DVT) of right lower extremity 06/23/2021    Chronic pain syndrome 06/22/2021    Chronic sinusitis     COPD (chronic obstructive pulmonary disease)     Coronary artery disease     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    Elevated cholesterol     Eustachian tube dysfunction     Heart disease     Herpes simplex     History of transfusion     Hyperlipidemia     Hypertension     Hypothyroidism 12/23/2020    Intrinsic asthma 12/23/2020    Knee dislocation     Labral tear of right hip joint     Laryngitis sicca     Laryngitis, chronic     Left carotid bruit 03/09/2016    MI (myocardial infarction)     Myalgia due to statin 06/25/2019    Open wound of right hip 09/14/2021    Osteomyelitis of right femur 07/06/2021    Otorrhea     Pacemaker 11/17/2016    Primary osteoarthritis of left knee 12/23/2020    Psoriasis vulgaris 12/23/2020    S/P coronary artery stent placement 03/09/2016    Sensorineural hearing loss     Seropositive rheumatoid arthritis of multiple sites 12/27/2019    Overview:  -myochrysine '93-'96 -methotrexate '96--->11/98;r/s  restarted 2/99--> 8/14 (anemia) -sulfasalazine- not effective -penicillamine 6/98-->10/98; no effect -leflunomide 11/98--> - Humira '13-->didn't take - Enbrel 12/14-->3/15- no effect!   Last Assessment & Plan:  - \"aching all over\" because she had to be off her anti-rheumatic drugs for 2 weeks in preparation for her R knee surgery - he    Sick sinus syndrome 12/27/2019    Sjogren's disease     Spondylolisthesis of lumbar region 01/17/2018    Syncope, recurrent 02/08/2021    Urinary tract infection      Past Surgical History:   Procedure Laterality Date    A-V CARDIAC PACEMAKER INSERTION  2016    ATRIAL CARDIAC PACEMAKER INSERTION      CARDIAC CATHETERIZATION      CATARACT EXTRACTION      CERVICAL CORPECTOMY N/A 3/3/2021    Procedure: CERVICAL 6 CORPECTOMY WITH TITANIUM CAGE WITH NEURO MONITORING;  Surgeon: Shabbir, " Bandar Vizcaino MD;  Location:  [facility];  Service: Neurosurgery;  Laterality: N/A;    COLONOSCOPY  11/08/2011    One fold in the ascending colon which showed ulcer otherwise normal exam    COLONOSCOPY  11/12/2004    Normal exam repeat in five years    CORONARY ANGIOPLASTY WITH STENT PLACEMENT      X 2; 2013 & 2014    ENDOSCOPY  07/10/2014    Normal exam    FLAP LEG Right 9/14/2021    Procedure: RIGHT GLUTEAL FASCIOCUTANEOUS ADVANCEMENT FLAP AND RIGHT TENSOR FASCIAL JESSICA FLAP;  Surgeon: Amadeo Turner MD;  Location:  [facility];  Service: Plastics;  Laterality: Right;    HIP ABDUCTION TENOTOMY BILATERAL Right 1/14/2021    Procedure: RIGHT HIP GLUTEUS MEDLUS / MINIMUS REPAIR, POSSIBLE ACHILLES ALLOGRAFT;  Surgeon: Nino Carlson MD;  Location:  [facility];  Service: Orthopedics;  Laterality: Right;    INCISION AND DRAINAGE ABSCESS Right 6/4/2022    Procedure: INCISION AND DRAINAGE ABSCESS right hip;  Surgeon: Magda Salcido MD;  Location:  [facility];  Service: General;  Laterality: Right;    INCISION AND DRAINAGE ABSCESS Right 6/10/2022    Procedure: RIGHT HIP INCISION AND DRAINAGE. MD NEEDS 3L VANC IRRIGATION, CURRETTES, DAICANS, KERLEX ROLLS;  Surgeon: Amadeo Turner MD;  Location:  [facility];  Service: Plastics;  Laterality: Right;    INCISION AND DRAINAGE HIP Right 2/9/2021    Procedure: HIP INCISION AND DRAINAGE;  Surgeon: Nino Carlson MD;  Location:  [facility];  Service: Orthopedics;  Laterality: Right;    INCISION AND DRAINAGE LEG Right 10/24/2021    Procedure: INCISION AND DRAINAGE LOWER EXTREMITY;  Surgeon: Amadeo Turner MD;  Location:  [facility];  Service: Plastics;  Laterality: Right;    INCISION AND DRAINAGE OF WOUND Right 7/8/2021    Procedure: INCISION AND DRAINAGE WOUND RIGHT HIP;  Surgeon: James Huntley MD;  Location:  [facility];  Service: Orthopedics;  Laterality: Right;    JOINT REPLACEMENT      KYPHOPLASTY WITH BIOPSY Bilateral 10/26/2021    Procedure: THOARCIC 12 KYPHOPLASTY WITH  BIOPSY;  Surgeon: Bandar Shea MD;  Location:  [facility];  Service: Neurosurgery;  Laterality: Bilateral;    LEG DEBRIDEMENT Right 9/14/2021    Procedure: DEBRIDEMENT OF RIGHT HIP WOUND, RIGHT GLUTEAL FASCIOCUTANEOUS ADVANCEMENT FLAP AND RIGHT TENSOR FASCIAL JESSICA FLAP;  Surgeon: Amadeo Turner MD;  Location:  [facility];  Service: Plastics;  Laterality: Right;    LUMBAR DISCECTOMY Right 3/23/2021    Procedure: LUMBAR DISCECTOMY MICRO, Lumbar 1/2 right;  Surgeon: Bandar Shea MD;  Location:  [facility];  Service: Neurosurgery;  Laterality: Right;    LUMBAR FUSION N/A 1/19/2018    Procedure: L3-4,L4-5 DECOMPRESSION, POSTERIOR SPINAL FUSION WITH INSTRUMENTATION;  Surgeon: Fortino Oropeza MD;  Location:  [facility];  Service:     LUMBAR LAMINECTOMY WITH FUSION Left 1/17/2018    Procedure: LEFT L3-4 L4-5 LATERAL LUMBAR INTERBODY FUSION;  Surgeon: Fortino Oropeza MD;  Location:  [facility];  Service:     MYRINGOTOMY W/ TUBES  09/04/2014    TUBES NO LONGER IN PLACE    OTHER SURGICAL HISTORY      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 so hardware was removed and spacers were placed    REPLACEMENT TOTAL KNEE Right       General Information       Row Name 04/22/24 0908          Physical Therapy Time and Intention    Document Type evaluation;other (see comments)  see MAR  -JE     Mode of Treatment physical therapy  -       Row Name 04/22/24 0908          General Information    Patient Profile Reviewed yes  -JE     Prior Level of Function --  pt has been living at home w/ 24/7 caregivers who provide her  care and assist w/ her mobility and ADLs  -JE     Existing Precautions/Restrictions fall;other (see comments)  mass at R hip w/ increase pain limiting tolerance to activity  -JE     Barriers to Rehab medically complex;previous functional deficit;contractures;impaired sensation;physical barrier  -JE       Row Name 04/22/24 0908          Living Environment    People in Home other (see comments)  pt has 24/7  caregivers and supportive family that check in on her regularly  -MAYE       Row Name 04/22/24 0908          Home Main Entrance    Number of Stairs, Main Entrance other (see comments)  flat entrance; small ramp to move to porch  -MAYE       Row Name 04/22/24 0908          Stairs Within Home, Primary    Number of Stairs, Within Home, Primary none  -MAYE       Row Name 04/22/24 0908          Cognition    Orientation Status (Cognition) oriented x 4  -       Row Name 04/22/24 0908          Safety Issues, Functional Mobility    Safety Issues Affecting Function (Mobility) friction/shear risk  -     Impairments Affecting Function (Mobility) balance;endurance/activity tolerance;pain;strength;sensation/sensory awareness;range of motion (ROM)  -               User Key  (r) = Recorded By, (t) = Taken By, (c) = Cosigned By      Initials Name Provider Type    Anupama Miranda, PT Physical Therapist                   Mobility       Row Name 04/22/24 0908          Bed Mobility    Bed Mobility rolling left;rolling right;scooting/bridging;supine-sit;sit-supine  -     Rolling Left Bureau (Bed Mobility) contact guard;verbal cues  -MAYE     Rolling Right Bureau (Bed Mobility) contact guard;verbal cues  -MAYE     Scooting/Bridging Bureau (Bed Mobility) maximum assist (25% patient effort);verbal cues  -MAYE     Supine-Sit Bureau (Bed Mobility) contact guard;verbal cues  -     Sit-Supine Bureau (Bed Mobility) contact guard;verbal cues  -     Assistive Device (Bed Mobility) bed rails;draw sheet  -       Row Name 04/22/24 0908          Bed-Chair Transfer    Bed-Chair Bureau (Transfers) minimum assist (75% patient effort);contact guard;verbal cues  -     Assistive Device (Bed-Chair Transfers) walker, front-wheeled  -MAYE       Row Name 04/22/24 0908          Sit-Stand Transfer    Sit-Stand Bureau (Transfers) minimum assist (75% patient effort);contact guard;verbal cues  -       Row Name  04/22/24 0908          Gait/Stairs (Locomotion)    [place] Level (Gait) minimum assist (75% patient effort);contact guard;verbal cues  -     Assistive Device (Gait) walker, front-wheeled  -     Distance in Feet (Gait) 5  X 2  -     Deviations/Abnormal Patterns (Gait) base of support, wide;gait speed decreased;stride length decreased  -     Bilateral Gait Deviations forward flexed posture  -     Comment, (Gait/Stairs) on toe R foot w/ knee flexion  -               User Key  (r) = Recorded By, (t) = Taken By, (c) = Cosigned By      Initials Name Provider Type     Anupama De La Cruz, PT Physical Therapist                   Obj/Interventions       Row Name 04/22/24 0908          Range of Motion Comprehensive    Comment, General Range of Motion AROM B UEs WFLs. L LE WFLs, R hip ER unable to achieve neutral, R knee w/ flexion contracture w/ inability to achieve full extension and decrease R ankle DF inability to achieve neutral DF  -       Row Name 04/22/24 0908          Strength Comprehensive (MMT)    Comment, General Manual Muscle Testing (MMT) Assessment R ankle DF 4/5, R ankle PF and L ankle DF/PF 4+ to 5/5  -       Row Name 04/22/24 0908          Balance    Balance Assessment sitting static balance;sitting dynamic balance;sit to stand dynamic balance;standing static balance;standing dynamic balance  -     Static Sitting Balance standby assist;independent  -     Dynamic Sitting Balance standby assist  -     Position, Sitting Balance supported;sitting in chair;sitting edge of bed  -     Sit to Stand Dynamic Balance minimal assist;contact guard;verbal cues  -     Static Standing Balance contact guard;verbal cues  -     Dynamic Standing Balance minimal assist;contact guard;verbal cues  -     Position/Device Used, Standing Balance supported;walker, front-wheeled  -       Row Name 04/22/24 0908          Sensory Assessment (Somatosensory)    Sensory Assessment (Somatosensory) other (see  comments)  Reports N/T primarily in her feet that comes and goes  -               User Key  (r) = Recorded By, (t) = Taken By, (c) = Cosigned By      Initials Name Provider Type    Anupama Miranda, PT Physical Therapist                   Goals/Plan       Row Name 04/22/24 0908          Bed Mobility Goal 1 (PT)    Activity/Assistive Device (Bed Mobility Goal 1, PT) scooting  -JE     [place] Level/Cues Needed (Bed Mobility Goal 1, PT) contact guard required  -JE     Time Frame (Bed Mobility Goal 1, PT) long term goal (LTG);10 days  -JE     Progress/Outcomes (Bed Mobility Goal 1, PT) goal ongoing  -       Row Name 04/22/24 0908          Transfer Goal 1 (PT)    Activity/Assistive Device (Transfer Goal 1, PT) sit-to-stand/stand-to-sit;bed-to-chair/chair-to-bed  -JE     [place] Level/Cues Needed (Transfer Goal 1, PT) contact guard required;standby assist  -JE     Time Frame (Transfer Goal 1, PT) long term goal (LTG);10 days  -JE     Progress/Outcome (Transfer Goal 1, PT) goal ongoing  -       Row Name 04/22/24 0908          Gait Training Goal 1 (PT)    Activity/Assistive Device (Gait Training Goal 1, PT) gait (walking locomotion);assistive device use;decrease fall risk;improve balance and speed;increase endurance/gait distance;increase energy conservation;walker, rolling  -     [place] Level (Gait Training Goal 1, PT) contact guard required;standby assist  -JE     Distance (Gait Training Goal 1, PT) 15 ft  -JE     Time Frame (Gait Training Goal 1, PT) long term goal (LTG);10 days  -JE     Progress/Outcome (Gait Training Goal 1, PT) goal ongoing  -       Row Name 04/22/24 0908          Therapy Assessment/Plan (PT)    Planned Therapy Interventions (PT) balance training;bed mobility training;gait training;home exercise program;postural re-education;patient/family education;ROM (range of motion);stretching;strengthening;transfer training;other (see comments)  safety/falls prevention, skin  protection/pressure relief  -               User Key  (r) = Recorded By, (t) = Taken By, (c) = Cosigned By      Initials Name Provider Type    Anupama Miranda, PT Physical Therapist                   Clinical Impression       Row Name 04/22/24 0908          Pain    Pre/Posttreatment Pain Comment no pain at rest, however w/ rolling, repositioning and performing functional mobility increase pain at site of new onset mass at R hip, reported improvement in pain once sitting in chair  -     Pain Intervention(s) Repositioned;Rest  -     Additional Documentation Pain Scale: FACES Pre/Post-Treatment (Group)  -       Row Name 04/22/24 0908          Pain Scale: FACES Pre/Post-Treatment    Pain: FACES Scale, Pretreatment 0-->no hurt  -     Posttreatment Pain Rating 2-->hurts little bit  -     Pain Location - Side/Orientation Right  -     Pain Location - hip  -       Row Name 04/22/24 0908          Plan of Care Review    Plan of Care Reviewed With patient;caregiver;sibling  -     Progress no change  -     Outcome Evaluation PT eval completed.  Pt pleasant and agreeable to therapy oriented X 4.  Pt reports she has been in the bed since Friday.  Pt performed rolling L/R in order to complete hygiene requiring CGA for rolling w/ use of bedrails.  Pt requires max assist to scoot up in bed.  Supine to/from sit w/ CGA and bedrails w/ increase time and effort.  Tfer sit to/from stand w/ min to CGA w/ increase effort.  Pt performed gait ~ 5 ft forward walking w/ rwx w/ min to CGA w/ noted flexed posture, increase CARMELO, slowed speed, decrease knee extension on R w/ pt toe walking on R.  Pt required seated rest and then reported increase c/os dizziness.  Pt education for LE exercise in sitting w/ some improvements reported.  Pt performed side stepping to L w/ rwx and CGA 5 ft a 2nd time w/ min to CGA w/ same noted gait deficits.  Pt returned to bed and assist for position of comfort.  Pt will benefit from continued  PT services to improve functional mobility I, balance, activity tolerance, to assist w/ ROM and strength gains, to improve safety awareness and reduce fall risk.  PT to follow for progress and needs. Discharge plans pending progress.  Pt is anticipating possible surgery.  -       Row Name 04/22/24 0908          Therapy Assessment/Plan (PT)    Patient/Family Therapy Goals Statement (PT) decrease pain at R hip  -     Rehab Potential (PT) good, to achieve stated therapy goals  -     Criteria for Skilled Interventions Met (PT) yes;meets criteria;skilled treatment is necessary  -     Therapy Frequency (PT) 2 times/day  -     Predicted Duration of Therapy Intervention (PT) until discharge or goals achieve  -       Row Name 04/22/24 0908          Vital Signs    Pre Patient Position Supine  -     Intra Patient Position Standing  -     Post Patient Position Supine  -       Row Name 04/22/24 0908          Positioning and Restraints    Pre-Treatment Position in bed  -JE     Post Treatment Position bed  -JE     In Bed notified nsg;fowlers;with family/caregiver;encouraged to call for assist;call light within reach;side rails up x2  -               User Key  (r) = Recorded By, (t) = Taken By, (c) = Cosigned By      Initials Name Provider Type    Anupama Miranda, PT Physical Therapist                   Outcome Measures       Row Name 04/22/24 0908 04/22/24 0820       How much help from another person do you currently need...    Turning from your back to your side while in flat bed without using bedrails? 3  -JE 2  -MS    Moving from lying on back to sitting on the side of a flat bed without bedrails? 3  -JE 2  -MS    Moving to and from a bed to a chair (including a wheelchair)? 3  -JE 2  -MS    Standing up from a chair using your arms (e.g., wheelchair, bedside chair)? 3  -JE 2  -MS    Climbing 3-5 steps with a railing? 2  -JE 2  -MS    To walk in hospital room? 3  -JE 2  -MS    AM-PAC 6 Clicks Score (PT)  17  -JE 12  -MS    Highest Level of Mobility Goal 5 --> Static standing  - 4 --> Transfer to chair/commode  -MS      Row Name 04/22/24 0908          Functional Assessment    Outcome Measure Options AM-PAC 6 Clicks Basic Mobility (PT)  -               User Key  (r) = Recorded By, (t) = Taken By, (c) = Cosigned By      Initials Name Provider Type    Anupama Miranda, PT Physical Therapist    Leeann Pate, RN Registered Nurse                                 Physical Therapy Education       Title: PT OT SLP Therapies (In Progress)       Topic: Physical Therapy (In Progress)       Point: Mobility training (Done)       Learning Progress Summary             Patient Acceptance, E,TB,D, VU,DU,NR by  at 4/22/2024 1658    Comment: education re: purpose of PT/importance of activity, for safety/falls prevention, pacing of activity, LE exercise in sitting   Family Acceptance, E,TB,D, VU,DU,NR by  at 4/22/2024 1658    Comment: education re: purpose of PT/importance of activity, for safety/falls prevention, pacing of activity, LE exercise in sitting   Caregiver Acceptance, E,TB,D, VU,DU,NR by  at 4/22/2024 1658    Comment: education re: purpose of PT/importance of activity, for safety/falls prevention, pacing of activity, LE exercise in sitting                         Point: Home exercise program (Not Started)       Learner Progress:  Not documented in this visit.              Point: Precautions (Done)       Learning Progress Summary             Patient Acceptance, E,TB,D, VU,DU,NR by  at 4/22/2024 1658    Comment: education re: purpose of PT/importance of activity, for safety/falls prevention, pacing of activity, LE exercise in sitting   Family Acceptance, E,TB,D, VU,DU,NR by  at 4/22/2024 1658    Comment: education re: purpose of PT/importance of activity, for safety/falls prevention, pacing of activity, LE exercise in sitting   Caregiver Acceptance, E,TB,D, VU,DU,NR by  at 4/22/2024 1658    Comment:  education re: purpose of PT/importance of activity, for safety/falls prevention, pacing of activity, LE exercise in sitting                                         User Key       Initials Effective Dates Name Provider Type Discipline    MAYE 08/02/18 -  Anupama De La Cruz, PT Physical Therapist PT                  PT Recommendation and Plan  Planned Therapy Interventions (PT): balance training, bed mobility training, gait training, home exercise program, postural re-education, patient/family education, ROM (range of motion), stretching, strengthening, transfer training, other (see comments) (safety/falls prevention, skin protection/pressure relief)  Plan of Care Reviewed With: patient, caregiver, sibling  Progress: no change  Outcome Evaluation: PT eval completed.  Pt pleasant and agreeable to therapy oriented X 4.  Pt reports she has been in the bed since Friday.  Pt performed rolling L/R in order to complete hygiene requiring CGA for rolling w/ use of bedrails.  Pt requires max assist to scoot up in bed.  Supine to/from sit w/ CGA and bedrails w/ increase time and effort.  Tfer sit to/from stand w/ min to CGA w/ increase effort.  Pt performed gait ~ 5 ft forward walking w/ rwx w/ min to CGA w/ noted flexed posture, increase CARMELO, slowed speed, decrease knee extension on R w/ pt toe walking on R.  Pt required seated rest and then reported increase c/os dizziness.  Pt education for LE exercise in sitting w/ some improvements reported.  Pt performed side stepping to L w/ rwx and CGA 5 ft a 2nd time w/ min to CGA w/ same noted gait deficits.  Pt returned to bed and assist for position of comfort.  Pt will benefit from continued PT services to improve functional mobility I, balance, activity tolerance, to assist w/ ROM and strength gains, to improve safety awareness and reduce fall risk.  PT to follow for progress and needs. Discharge plans pending progress.  Pt is anticipating possible surgery.     Time Calculation:          PT Charges       Row Name 04/22/24 1531             Time Calculation    Start Time 0908  -      Stop Time 1019  -      Time Calculation (min) 71 min  -      PT Received On 04/22/24  -      PT Goal Re-Cert Due Date 05/02/24  -                User Key  (r) = Recorded By, (t) = Taken By, (c) = Cosigned By      Initials Name Provider Type    Anupama Miranda, PT Physical Therapist                  Therapy Charges for Today       Code Description Service Date Service Provider Modifiers Qty    42682533995 HC PT EVAL MOD COMPLEXITY 4 4/22/2024 Anupama De La Cruz, PT GP 1    86348999679 HC GAIT TRAINING EA 15 MIN 4/22/2024 Anupama De La Cruz, PT GP 1            PT G-Codes  Outcome Measure Options: AM-PAC 6 Clicks Basic Mobility (PT)  AM-PAC 6 Clicks Score (PT): 17  PT Discharge Summary  Anticipated Discharge Disposition (PT): other (see comments) (pending progress)    Anupama De La Cruz, PT  4/22/2024

## 2024-04-22 NOTE — CASE MANAGEMENT/SOCIAL WORK
Discharge Planning Assessment  [facility]     Patient Name: Tawny Shin  MRN: [id number]  Today's Date: 4/22/2024    Admit Date: 4/19/2024        Discharge Needs Assessment       Row Name 04/22/24 1155       Living Environment    People in Home alone;other (see comments)  Has 24/7 caregivers.    Current Living Arrangements home    Primary Care Provided by self  24/7 caregivers    Able to Return to Prior Arrangements yes       Transition Planning    Patient/Family Anticipates Transition to home with help/services       Discharge Needs Assessment    Equipment Currently Used at Home walker, rolling;wheelchair;wheelchair, motorized;shower chair;hospital bed    Current Discharge Risk chronically ill;physical impairment    Discharge Coordination/Progress Spoke with patient and caregiver at bedside for dc planning. Patient lives at home with 24/7 caregivers. Has all dme needed. Goes to [facility] 2 x /week. Plans to discharge home and continue current arrangements.                   Discharge Plan    No documentation.                 Continued Care and Services - Admitted Since 4/19/2024    No active coordination exists for this encounter.          Demographic Summary    No documentation.                  Functional Status    No documentation.                  Psychosocial    No documentation.                  Abuse/Neglect    No documentation.                  Legal    No documentation.                  Substance Abuse    No documentation.                  Patient Forms    No documentation.                     Merlina A Fletcher, RN

## 2024-04-23 ENCOUNTER — ANESTHESIA (OUTPATIENT)
Dept: PERIOP | Facility: HOSPITAL | Age: 69
End: 2024-04-23
Payer: MEDICARE

## 2024-04-23 ENCOUNTER — READMISSION MANAGEMENT (OUTPATIENT)
Dept: CALL CENTER | Facility: HOSPITAL | Age: 69
End: 2024-04-23
Payer: MEDICARE

## 2024-04-23 ENCOUNTER — ANESTHESIA EVENT (OUTPATIENT)
Dept: PERIOP | Facility: HOSPITAL | Age: 69
End: 2024-04-23
Payer: MEDICARE

## 2024-04-23 ENCOUNTER — PREP FOR SURGERY (OUTPATIENT)
Dept: OTHER | Facility: HOSPITAL | Age: 69
End: 2024-04-23
Payer: MEDICARE

## 2024-04-23 VITALS
HEIGHT: 67 IN | HEART RATE: 68 BPM | SYSTOLIC BLOOD PRESSURE: 107 MMHG | TEMPERATURE: 98.9 F | OXYGEN SATURATION: 94 % | DIASTOLIC BLOOD PRESSURE: 52 MMHG | BODY MASS INDEX: 34.58 KG/M2 | RESPIRATION RATE: 18 BRPM | WEIGHT: 220.3 LBS

## 2024-04-23 DIAGNOSIS — R22.2 MASS OF BUTTOCK: Primary | ICD-10-CM

## 2024-04-23 PROBLEM — A41.51 SEPSIS DUE TO ESCHERICHIA COLI WITHOUT ACUTE ORGAN DYSFUNCTION: Status: ACTIVE | Noted: 2024-04-23

## 2024-04-23 PROBLEM — L72.9 CYST OF BUTTOCKS: Status: ACTIVE | Noted: 2024-04-23

## 2024-04-23 PROCEDURE — 87186 SC STD MICRODIL/AGAR DIL: CPT | Performed by: FAMILY MEDICINE

## 2024-04-23 PROCEDURE — 25010000002 EPINEPHRINE 1 MG/ML SOLUTION 1 ML AMPULE: Performed by: SURGERY

## 2024-04-23 PROCEDURE — 25010000002 GLYCOPYRROLATE 0.4 MG/2ML SOLUTION: Performed by: NURSE ANESTHETIST, CERTIFIED REGISTERED

## 2024-04-23 PROCEDURE — 88307 TISSUE EXAM BY PATHOLOGIST: CPT | Performed by: SURGERY

## 2024-04-23 PROCEDURE — 63710000001 PREDNISONE PER 5 MG: Performed by: FAMILY MEDICINE

## 2024-04-23 PROCEDURE — 25010000002 FENTANYL CITRATE (PF) 100 MCG/2ML SOLUTION: Performed by: NURSE ANESTHETIST, CERTIFIED REGISTERED

## 2024-04-23 PROCEDURE — 25810000003 LACTATED RINGERS PER 1000 ML: Performed by: ANESTHESIOLOGY

## 2024-04-23 PROCEDURE — 25010000002 BUPIVACAINE (PF) 0.5 % SOLUTION 30 ML VIAL: Performed by: SURGERY

## 2024-04-23 PROCEDURE — 87070 CULTURE OTHR SPECIMN AEROBIC: CPT | Performed by: FAMILY MEDICINE

## 2024-04-23 PROCEDURE — 25010000002 PROPOFOL 10 MG/ML EMULSION: Performed by: NURSE ANESTHETIST, CERTIFIED REGISTERED

## 2024-04-23 PROCEDURE — 25010000002 CEFTRIAXONE PER 250 MG: Performed by: SURGERY

## 2024-04-23 PROCEDURE — 27040 BIOPSY OF SOFT TISSUES: CPT | Performed by: SURGERY

## 2024-04-23 PROCEDURE — 87205 SMEAR GRAM STAIN: CPT | Performed by: FAMILY MEDICINE

## 2024-04-23 PROCEDURE — 0JB90ZZ EXCISION OF BUTTOCK SUBCUTANEOUS TISSUE AND FASCIA, OPEN APPROACH: ICD-10-PCS | Performed by: SURGERY

## 2024-04-23 PROCEDURE — 25010000002 ONDANSETRON PER 1 MG: Performed by: NURSE ANESTHETIST, CERTIFIED REGISTERED

## 2024-04-23 RX ORDER — HYDROMORPHONE HYDROCHLORIDE 1 MG/ML
0.2 INJECTION, SOLUTION INTRAMUSCULAR; INTRAVENOUS; SUBCUTANEOUS
Status: DISCONTINUED | OUTPATIENT
Start: 2024-04-23 | End: 2024-04-23 | Stop reason: HOSPADM

## 2024-04-23 RX ORDER — LABETALOL HYDROCHLORIDE 5 MG/ML
5 INJECTION, SOLUTION INTRAVENOUS
Status: DISCONTINUED | OUTPATIENT
Start: 2024-04-23 | End: 2024-04-23 | Stop reason: HOSPADM

## 2024-04-23 RX ORDER — PROPOFOL 10 MG/ML
VIAL (ML) INTRAVENOUS AS NEEDED
Status: DISCONTINUED | OUTPATIENT
Start: 2024-04-23 | End: 2024-04-23 | Stop reason: SURG

## 2024-04-23 RX ORDER — BUPIVACAINE HYDROCHLORIDE AND EPINEPHRINE 5; .0091 MG/ML; MG/ML
INJECTION, SOLUTION DENTAL; INFILTRATION AS NEEDED
Status: DISCONTINUED | OUTPATIENT
Start: 2024-04-23 | End: 2024-04-23 | Stop reason: HOSPADM

## 2024-04-23 RX ORDER — MAGNESIUM HYDROXIDE 1200 MG/15ML
LIQUID ORAL AS NEEDED
Status: DISCONTINUED | OUTPATIENT
Start: 2024-04-23 | End: 2024-04-23 | Stop reason: HOSPADM

## 2024-04-23 RX ORDER — SODIUM CHLORIDE 0.9 % (FLUSH) 0.9 %
3 SYRINGE (ML) INJECTION EVERY 12 HOURS SCHEDULED
Status: DISCONTINUED | OUTPATIENT
Start: 2024-04-23 | End: 2024-04-23 | Stop reason: HOSPADM

## 2024-04-23 RX ORDER — ONDANSETRON 2 MG/ML
INJECTION INTRAMUSCULAR; INTRAVENOUS AS NEEDED
Status: DISCONTINUED | OUTPATIENT
Start: 2024-04-23 | End: 2024-04-23 | Stop reason: SURG

## 2024-04-23 RX ORDER — GLYCOPYRROLATE 0.2 MG/ML
INJECTION INTRAMUSCULAR; INTRAVENOUS AS NEEDED
Status: DISCONTINUED | OUTPATIENT
Start: 2024-04-23 | End: 2024-04-23 | Stop reason: SURG

## 2024-04-23 RX ORDER — SODIUM CHLORIDE 0.9 % (FLUSH) 0.9 %
10 SYRINGE (ML) INJECTION AS NEEDED
Status: DISCONTINUED | OUTPATIENT
Start: 2024-04-23 | End: 2024-04-23 | Stop reason: HOSPADM

## 2024-04-23 RX ORDER — FLUMAZENIL 0.1 MG/ML
0.2 INJECTION INTRAVENOUS AS NEEDED
Status: DISCONTINUED | OUTPATIENT
Start: 2024-04-23 | End: 2024-04-23 | Stop reason: HOSPADM

## 2024-04-23 RX ORDER — SODIUM CHLORIDE 0.9 % (FLUSH) 0.9 %
10 SYRINGE (ML) INJECTION EVERY 12 HOURS SCHEDULED
Status: DISCONTINUED | OUTPATIENT
Start: 2024-04-23 | End: 2024-04-23 | Stop reason: HOSPADM

## 2024-04-23 RX ORDER — NEOSTIGMINE METHYLSULFATE 5 MG/5 ML
SYRINGE (ML) INTRAVENOUS AS NEEDED
Status: DISCONTINUED | OUTPATIENT
Start: 2024-04-23 | End: 2024-04-23 | Stop reason: SURG

## 2024-04-23 RX ORDER — NALOXONE HCL 0.4 MG/ML
0.04 VIAL (ML) INJECTION AS NEEDED
Status: DISCONTINUED | OUTPATIENT
Start: 2024-04-23 | End: 2024-04-23 | Stop reason: HOSPADM

## 2024-04-23 RX ORDER — DROPERIDOL 2.5 MG/ML
0.62 INJECTION, SOLUTION INTRAMUSCULAR; INTRAVENOUS ONCE AS NEEDED
Status: DISCONTINUED | OUTPATIENT
Start: 2024-04-23 | End: 2024-04-23 | Stop reason: HOSPADM

## 2024-04-23 RX ORDER — CEFDINIR 300 MG/1
300 CAPSULE ORAL 2 TIMES DAILY
Qty: 10 CAPSULE | Refills: 0 | Status: SHIPPED | OUTPATIENT
Start: 2024-04-23 | End: 2024-05-01

## 2024-04-23 RX ORDER — HYDROCODONE BITARTRATE AND ACETAMINOPHEN 5; 325 MG/1; MG/1
1 TABLET ORAL ONCE AS NEEDED
Status: DISCONTINUED | OUTPATIENT
Start: 2024-04-23 | End: 2024-04-23 | Stop reason: HOSPADM

## 2024-04-23 RX ORDER — SULFAMETHOXAZOLE AND TRIMETHOPRIM 800; 160 MG/1; MG/1
1 TABLET ORAL EVERY 12 HOURS SCHEDULED
Status: DISCONTINUED | OUTPATIENT
Start: 2024-04-23 | End: 2024-04-23 | Stop reason: HOSPADM

## 2024-04-23 RX ORDER — SODIUM CHLORIDE 9 MG/ML
40 INJECTION, SOLUTION INTRAVENOUS AS NEEDED
Status: DISCONTINUED | OUTPATIENT
Start: 2024-04-23 | End: 2024-04-23 | Stop reason: HOSPADM

## 2024-04-23 RX ORDER — ROCURONIUM BROMIDE 10 MG/ML
INJECTION, SOLUTION INTRAVENOUS AS NEEDED
Status: DISCONTINUED | OUTPATIENT
Start: 2024-04-23 | End: 2024-04-23 | Stop reason: SURG

## 2024-04-23 RX ORDER — SODIUM CHLORIDE, SODIUM LACTATE, POTASSIUM CHLORIDE, CALCIUM CHLORIDE 600; 310; 30; 20 MG/100ML; MG/100ML; MG/100ML; MG/100ML
100 INJECTION, SOLUTION INTRAVENOUS CONTINUOUS
Status: DISCONTINUED | OUTPATIENT
Start: 2024-04-23 | End: 2024-04-23

## 2024-04-23 RX ORDER — FENTANYL CITRATE 50 UG/ML
50 INJECTION, SOLUTION INTRAMUSCULAR; INTRAVENOUS
Status: DISCONTINUED | OUTPATIENT
Start: 2024-04-23 | End: 2024-04-23 | Stop reason: HOSPADM

## 2024-04-23 RX ORDER — ONDANSETRON 2 MG/ML
4 INJECTION INTRAMUSCULAR; INTRAVENOUS
Status: DISCONTINUED | OUTPATIENT
Start: 2024-04-23 | End: 2024-04-23 | Stop reason: HOSPADM

## 2024-04-23 RX ORDER — FENTANYL CITRATE 50 UG/ML
INJECTION, SOLUTION INTRAMUSCULAR; INTRAVENOUS AS NEEDED
Status: DISCONTINUED | OUTPATIENT
Start: 2024-04-23 | End: 2024-04-23 | Stop reason: SURG

## 2024-04-23 RX ORDER — SULFAMETHOXAZOLE AND TRIMETHOPRIM 800; 160 MG/1; MG/1
1 TABLET ORAL 2 TIMES DAILY
Qty: 14 TABLET | Refills: 0 | OUTPATIENT
Start: 2024-04-23 | End: 2024-04-30

## 2024-04-23 RX ORDER — SODIUM CHLORIDE 0.9 % (FLUSH) 0.9 %
3-10 SYRINGE (ML) INJECTION AS NEEDED
Status: DISCONTINUED | OUTPATIENT
Start: 2024-04-23 | End: 2024-04-23 | Stop reason: HOSPADM

## 2024-04-23 RX ADMIN — ROCURONIUM BROMIDE 30 MG: 10 INJECTION, SOLUTION INTRAVENOUS at 09:22

## 2024-04-23 RX ADMIN — ISOSORBIDE MONONITRATE 60 MG: 60 TABLET, EXTENDED RELEASE ORAL at 08:07

## 2024-04-23 RX ADMIN — SODIUM CHLORIDE, POTASSIUM CHLORIDE, SODIUM LACTATE AND CALCIUM CHLORIDE 100 ML/HR: 600; 310; 30; 20 INJECTION, SOLUTION INTRAVENOUS at 09:08

## 2024-04-23 RX ADMIN — PROPOFOL 150 MG: 10 INJECTION, EMULSION INTRAVENOUS at 09:22

## 2024-04-23 RX ADMIN — CEFTRIAXONE 1000 MG: 1 INJECTION, POWDER, FOR SOLUTION INTRAMUSCULAR; INTRAVENOUS at 13:05

## 2024-04-23 RX ADMIN — Medication 3.5 MG: at 09:58

## 2024-04-23 RX ADMIN — Medication: at 08:08

## 2024-04-23 RX ADMIN — FUROSEMIDE 40 MG: 40 TABLET ORAL at 08:07

## 2024-04-23 RX ADMIN — FOLIC ACID 1000 MCG: 1 TABLET ORAL at 08:07

## 2024-04-23 RX ADMIN — GLYCOPYRROLATE 0.4 MG: 0.2 INJECTION INTRAMUSCULAR; INTRAVENOUS at 09:59

## 2024-04-23 RX ADMIN — FERROUS SULFATE TAB 325 MG (65 MG ELEMENTAL FE) 325 MG: 325 (65 FE) TAB at 08:08

## 2024-04-23 RX ADMIN — DULOXETINE HYDROCHLORIDE 60 MG: 30 CAPSULE, DELAYED RELEASE ORAL at 08:08

## 2024-04-23 RX ADMIN — LOSARTAN POTASSIUM 50 MG: 50 TABLET, FILM COATED ORAL at 08:07

## 2024-04-23 RX ADMIN — CARVEDILOL 25 MG: 25 TABLET, FILM COATED ORAL at 08:07

## 2024-04-23 RX ADMIN — LEFLUNOMIDE 20 MG: 20 TABLET ORAL at 08:07

## 2024-04-23 RX ADMIN — PREDNISONE 5 MG: 5 TABLET ORAL at 08:08

## 2024-04-23 RX ADMIN — VALACYCLOVIR 500 MG: 500 TABLET, FILM COATED ORAL at 08:07

## 2024-04-23 RX ADMIN — SODIUM CHLORIDE, POTASSIUM CHLORIDE, SODIUM LACTATE AND CALCIUM CHLORIDE: 600; 310; 30; 20 INJECTION, SOLUTION INTRAVENOUS at 09:18

## 2024-04-23 RX ADMIN — ASPIRIN 81 MG: 81 TABLET, COATED ORAL at 08:07

## 2024-04-23 RX ADMIN — Medication 10 ML: at 08:08

## 2024-04-23 RX ADMIN — Medication 1 TABLET: at 08:08

## 2024-04-23 RX ADMIN — FENTANYL CITRATE 100 MCG: 50 INJECTION, SOLUTION INTRAMUSCULAR; INTRAVENOUS at 09:20

## 2024-04-23 RX ADMIN — SULFAMETHOXAZOLE AND TRIMETHOPRIM 1 TABLET: 800; 160 TABLET ORAL at 13:06

## 2024-04-23 RX ADMIN — ONDANSETRON 4 MG: 2 INJECTION INTRAMUSCULAR; INTRAVENOUS at 09:36

## 2024-04-23 NOTE — OP NOTE
OPERATIVE NOTE    Patient Name:  Tawny Shin  YOB: 1955  [id number]    Date of Surgery:  4/23/2024      PREOPERATIVE DIAGNOSIS: Right buttock mass      POSTOPERATIVE DIAGNOSIS: Same       PROCEDURE PERFORMED: Right buttock mass excision       SURGEON: Moises Keyes MD      ASSISTANT: Marsha Mckoy CST, CST       SPECIMENS: Right buttock mass       ANESTHESIA: General endotracheal anesthesia with local      FLUIDS: 300 mL      WOUND CLASSIFICATION: Class 4, dirty       FINDINGS:   1. Necrotic and fibrotic mass with purulent drainage within the subcutaneous plane. Entire mass excised to healthy tissue margins. 3 layered closure. Meticulous hemostasis.       INDICATIONS: The patient is a 68 y.o. female with a painful 4 cm right buttock nodule        DESCRIPTION OF PROCEDURE:     The patient was seen in the preoperative holding area and the H&P was updated. Informed consent was obtained from the patient.  The patient was taken to the operating room and placed in supine position. SCDs were placed on both legs. General anesthesia was administered and the patient was intubated without difficulty. The patient was placed in left lateral position. The patient was prepped and draped in the usual sterile fashion. A full surgical timeout was performed verifying the correct patient, correct procedure and site for surgery.     There was a 4 to 5 cm area of firmness over the midportion of the right buttock.  There was a sinus tract with drainage.  20 cc of half percent Marcaine with epinephrine was injected surrounding the lesion.  A 15 blade scalpel was used to make an oblique elliptical incision following Langher's lines.  Electrocautery was then used to incise the dermis and dissect through the subcutaneous tissue.  There was purulent drainage from the mass which was cultured.  The mass was necrotic and fibrotic.  The entirety of this mass was dissected free to healthy surrounding tissue.  The mass was sent to  pathology in formalin.  Once hemostasis was achieved, the wound was irrigated.  The deep subcutaneous fascia was closed using a running 2-0 Vicryl suture.  The dermis was closed using a running 3-0 Vicryl suture and the skin was closed using a 4-0 Monocryl suture in a running subcuticular fashion.  The incision was dressed with Mastisol and Steri-Strips.    At the completion of the procedure, all sharp, sponge and instrument counts were correct x2.  Patient was awoken from anesthesia and extubated in the operating room.  The patient was taken to the postanesthesia care unit in stable condition without any immediate complications.      Moises Keyes MD  4/23/2024  10:20 CDT      Please note that portions of this note were completed with a voice recognition program.

## 2024-04-23 NOTE — ANESTHESIA POSTPROCEDURE EVALUATION
"Patient: Tawny Shin    Procedure Summary       Date: 04/23/24 Room / Location:  [facility]  /  [facility]    Anesthesia Start: 0918 Anesthesia Stop: 1011    Procedure: RIGHT BUTTOCK MASS EXCISION (Right) Diagnosis:       Nodule of buttock      (Nodule of buttock [R22.2])    Surgeons: Moises Keyes MD Provider: Rigo Lind CRNA    Anesthesia Type: MAC ASA Status: 3            Anesthesia Type: MAC    Vitals  Vitals Value Taken Time   /53 04/23/24 1010   Temp 99.7 °F (37.6 °C) 04/23/24 1007   Pulse 76 04/23/24 1011   Resp 18 04/23/24 1007   SpO2 96 % 04/23/24 1011   Vitals shown include unfiled device data.        Post Anesthesia Care and Evaluation    Patient location during evaluation: PACU  Patient participation: complete - patient participated  Level of consciousness: awake and alert  Pain management: adequate    Airway patency: patent  Anesthetic complications: No anesthetic complications    Cardiovascular status: acceptable  Respiratory status: acceptable  Hydration status: acceptable    Comments: Blood pressure 118/53, pulse 77, temperature 99.7 °F (37.6 °C), temperature source Temporal, resp. rate 18, height 170.2 cm (67\"), weight 99.9 kg (220 lb 4.8 oz), SpO2 96%, not currently breastfeeding.    Pt discharged from PACU based on tyler score >8    "

## 2024-04-23 NOTE — INTERVAL H&P NOTE
H&P reviewed. The patient was examined and there are no changes to the H&P.   Questions answered. Patient consented. Plan for right buttock mass excision.

## 2024-04-23 NOTE — DISCHARGE SUMMARY
[facility] Medicine Services  DISCHARGE SUMMARY       Date of Admission: 4/19/2024  Date of Discharge:  4/23/2024  Primary Care Physician: Moises Oliveira MD    Discharge Diagnoses:  Active Hospital Problems    Diagnosis     **Sepsis due to Escherichia coli without acute organ dysfunction     Cyst of buttocks     Stage 3b chronic kidney disease     UTI (urinary tract infection)     E coli bacteremia          Presenting Problem/History of Present Illness:  UTI (urinary tract infection) [N39.0]     Chief Complaint on Day of Discharge:   No complaint    History of Present Illness on Day of Discharge:   The patient has undergone a successful cyst excision per Dr. Keyes.  He notes that the patient is appropriate for discharge today.  She continues on antibiotics but will be transition to oral antibiotics.  She will resume Eliquis as taken prior to admission.    Hospital Course  Patient started having symptoms on Monday. She had some nausea and episode of vomiting and felt weak. She had a low-grade fever. She felt better the next 2 to 3 days and then symptoms returned. She presented to the ER for evaluation. She is not complaining of any dysuria at this time.      Treatment Plan  The patient will be admitted to my service here at [facility].   Admit to the hospital  IV cefepime  Blood cultures are pending  Urine cultures pending    The definition, the patient was feeling better and was eating breakfast without difficulty.  She continued cefepime throughout her hospital stay.  When cultures revealed pansensitive E. coli, the patient was transitioned to IV Rocephin.  She complained of pain in her right buttock and there was noted to be a golf ball sized mass in the lateral, lower buttock.  General surgery was consulted and scheduled the patient for surgical excision which was done successfully as an inpatient.  Eliquis was held preoperatively.  The patient is doing well  from an infectious disease standpoint.  IV antibiot were to be transition to oral antibiotics at the time of discharge.  As noted above,ics urine culture revealed pansensitive E. coli.  Blood culture also revealed pansensitive E. coli.  MRSA nasal swab was positive however was felt to be a colonizer.  Repeat blood cultures performed on 4/20 showed no growth at 2 days indicating successful treatment of urinary tract infection.  The patient is stable for discharge today and is to follow-up with her PCP next week.  No surgical follow-up has been requested by general surgery.  Her PCP can follow-up to assess wound healing at the surgical site.    Procedures Performed:   Right buttock mass excision     Consults:   General Surgery:  Assessment & Plan  Assessment / Plan      Brief Patient Summary:  Tawny Shin is a 68 y.o. female who was admitted for a UTI but also found to have a right buttock nodule.  She is on Eliquis for DVT.     Active Hospital Problems:       Active Hospital Problems     Diagnosis      **UTI (urinary tract infection)      Nodule of buttock      Stage 3b chronic kidney disease      E coli bacteremia        1.  Right buttock nodule  2.  History of anticoagulation  3.  Urinary tract infection     Plan:      I will order a soft tissue ultrasound to fully evaluate the lesion.  It appears to be a solid nodule on CT scan.  The patient would like to have this removed during this admission.  She had her last dose of Eliquis this morning, therefore it will be held for 48 hours.  Start prophylactic Lovenox tomorrow evening.  Okay for a diet for now. Plan for right buttock mass excision in the operating room on Tuesday morning.  N.p.o. Monday night.     Plan to continue for UTI however I do not believe that this is a buttock abscess therefore antibiotics are not required for this etiology.  We are appropriately.  Tailor antibiotics appropriately.     Moises Keyes MD    Result Review    Result Review:  I  "have personally reviewed the results from the time of this admission to 4/23/2024 14:30 CDT and agree with these findings:  []  Laboratory  []  Microbiology  []  Radiology  []  EKG/Telemetry   []  Cardiology/Vascular   []  Pathology  []  Old records  []  Other:    Condition on Discharge:    Stable and improved    Physical Exam on Discharge:  /52 (BP Location: Left arm, Patient Position: Lying)   Pulse 68   Temp 98.9 °F (37.2 °C) (Oral)   Resp 18   Ht 170.2 cm (67\")   Wt 99.9 kg (220 lb 4.8 oz)   LMP  (LMP Unknown)   SpO2 94%   BMI 34.50 kg/m²   Physical Exam       Constitutional:       Appearance: She is well-developed. She is obese.   HENT:      Head: Normocephalic and atraumatic.      Right Ear: External ear normal.      Left Ear: External ear normal.      Nose: Nose normal.      Mouth: Mucous membranes are moist.   Eyes:      Extraocular Movements: Extraocular movements intact.      Conjunctiva/sclera: Conjunctivae normal.   Cardiovascular:      Rate and Rhythm: Normal rate and regular rhythm.      Pulses: Normal pulses.      Heart sounds: Normal heart sounds. No murmur heard.  Pulmonary:      Effort: Pulmonary effort is normal.      Breath sounds: Normal breath sounds.   Abdominal:      General: Bowel sounds are normal. There is no distension.      Palpations: Abdomen is soft.      Tenderness: There is no abdominal tenderness.   Musculoskeletal:         General: Normal range of motion.   Skin:     General: Skin is warm and dry.   Neurological:      Mental Status: She is alert and oriented to person, place, and time.      Coordination: Coordination normal.   Psychiatric:         Mood and Affect: Mood normal.         Behavior: Behavior normal.     Discharge Disposition:  Home or Self Care    Discharge Medications:     Discharge Medications        New Medications        Instructions Start Date   cefdinir 300 MG capsule  Commonly known as: OMNICEF   300 mg, Oral, 2 Times Daily             Continue " These Medications        Instructions Start Date   acetaminophen 325 MG tablet  Commonly known as: TYLENOL   650 mg, Oral, Every 6 Hours PRN      apixaban 5 MG tablet tablet  Commonly known as: Eliquis   5 mg, Oral, 2 Times Daily      apixaban 5 MG tablet tablet  Commonly known as: ELIQUIS   5 mg, Oral, 2 Times Daily      aspirin 81 MG EC tablet   81 mg, Oral, Daily      carvedilol 25 MG tablet  Commonly known as: COREG   25 mg, Oral, 2 Times Daily With Meals      Diclofenac Sodium 1 % gel gel  Commonly known as: VOLTAREN   4 g, Topical, 4 Times Daily PRN      DULoxetine 60 MG capsule  Commonly known as: CYMBALTA   60 mg, Oral, Daily      ferrous sulfate 324 (65 Fe) MG tablet delayed-release EC tablet   324 mg, Oral, Daily With Breakfast      folic acid 1 MG tablet  Commonly known as: FOLVITE   1 mg, Oral, Daily      furosemide 40 MG tablet  Commonly known as: LASIX   40 mg, Oral, Daily      isosorbide mononitrate 60 MG 24 hr tablet  Commonly known as: IMDUR   60 mg, Oral, Daily      leflunomide 20 MG tablet  Commonly known as: ARAVA   20 mg, Oral, Every Night at Bedtime      lidocaine 5 %  Commonly known as: Lidoderm   1 patch, Transdermal, Daily PRN, Remove & Discard patch within 12 hours or as directed by MD      losartan 50 MG tablet  Commonly known as: COZAAR   50 mg, Oral, Daily      magnesium hydroxide 400 MG/5ML suspension  Commonly known as: MILK OF MAGNESIA   30 mL, Oral, Daily PRN      multivitamin with minerals tablet tablet   1 tablet, Oral, Daily      nitroglycerin 0.4 MG SL tablet  Commonly known as: NITROSTAT   0.4 mg, Sublingual, Every 5 Minutes PRN, Take no more than 3 doses in 15 minutes.      ondansetron 4 MG tablet  Commonly known as: Zofran   4 mg, Oral, Every 8 Hours PRN      predniSONE 5 MG tablet  Commonly known as: DELTASONE   5 mg, Oral, Daily      valACYclovir 500 MG tablet  Commonly known as: VALTREX   500 mg, Oral, Daily PRN               Discharge Diet:   Diet Instructions       Diet:  Regular/House Diet; Regular (IDDSI 7); Thin (IDDSI 0)      Discharge Diet: Regular/House Diet    Texture: Regular (IDDSI 7)    Fluid Consistency: Thin (IDDSI 0)            Discharge Care Plan / Instructions:   Discharge home    Activity at Discharge:   Activity Instructions       Activity as Tolerated              Follow-up Appointments:  Follow-up with PCP next week    Electronically signed by Nayan Hale DO, 04/23/24, 14:30 CDT.    Time: Discharge over 30 min    Part of this note may be an electronic transcription/translation of spoken language to printed text using the Dragon Dictation system.

## 2024-04-23 NOTE — ANESTHESIA PROCEDURE NOTES
Airway  Urgency: elective    Date/Time: 4/23/2024 9:27 AM  Airway not difficult    General Information and Staff    Patient location during procedure: OR  CRNA/CAA: Nino Samayoa CRNA    Indications and Patient Condition  Indications for airway management: airway protection    Preoxygenated: yes  MILS maintained throughout  Mask difficulty assessment: 1 - vent by mask    Final Airway Details  Final airway type: endotracheal airway      Successful airway: ETT  Cuffed: yes   Successful intubation technique: direct laryngoscopy  Facilitating devices/methods: intubating stylet  Endotracheal tube insertion site: oral  Blade: Flores  Blade size: 2  ETT size (mm): 7.0  Cormack-Lehane Classification: grade IIb - view of arytenoids or posterior of glottis only  Placement verified by: chest auscultation and capnometry   Cuff volume (mL): 5  Measured from: gums  Number of attempts at approach: 2  Assessment: lips, teeth, and gum same as pre-op and atraumatic intubation

## 2024-04-23 NOTE — PLAN OF CARE
Problem: Adult Inpatient Plan of Care  Goal: Plan of Care Review  Outcome: Ongoing, Progressing  Goal: Patient-Specific Goal (Individualized)  Outcome: Ongoing, Progressing  Goal: Absence of Hospital-Acquired Illness or Injury  Outcome: Ongoing, Progressing  Intervention: Identify and Manage Fall Risk  Recent Flowsheet Documentation  Taken 4/23/2024 0400 by Wanda Aguirre RN  Safety Promotion/Fall Prevention: safety round/check completed  Taken 4/23/2024 0200 by Wanda Aguirre RN  Safety Promotion/Fall Prevention: safety round/check completed  Taken 4/23/2024 0000 by Wanda Aguirre RN  Safety Promotion/Fall Prevention: safety round/check completed  Taken 4/22/2024 2200 by Wanda Aguirre RN  Safety Promotion/Fall Prevention: safety round/check completed  Taken 4/22/2024 2100 by Wanda Aguirre RN  Safety Promotion/Fall Prevention: safety round/check completed  Taken 4/22/2024 2000 by Wanda Aguirre RN  Safety Promotion/Fall Prevention: safety round/check completed  Taken 4/22/2024 1900 by Wanda Aguirre RN  Safety Promotion/Fall Prevention: safety round/check completed  Goal: Optimal Comfort and Wellbeing  Outcome: Ongoing, Progressing  Goal: Readiness for Transition of Care  Outcome: Ongoing, Progressing     Problem: Skin Injury Risk Increased  Goal: Skin Health and Integrity  Outcome: Ongoing, Progressing     Problem: Diabetes Comorbidity  Goal: Blood Glucose Level Within Targeted Range  Outcome: Ongoing, Progressing     Problem: Heart Failure Comorbidity  Goal: Maintenance of Heart Failure Symptom Control  Outcome: Ongoing, Progressing     Problem: Hypertension Comorbidity  Goal: Blood Pressure in Desired Range  Outcome: Ongoing, Progressing     Problem: Osteoarthritis Comorbidity  Goal: Maintenance of Osteoarthritis Symptom Control  Outcome: Ongoing, Progressing     Problem: Pain Chronic (Persistent) (Comorbidity Management)  Goal: Acceptable Pain Control and Functional  Ability  Outcome: Ongoing, Progressing     Problem: Fall Injury Risk  Goal: Absence of Fall and Fall-Related Injury  Outcome: Ongoing, Progressing  Intervention: Promote Injury-Free Environment  Recent Flowsheet Documentation  Taken 4/23/2024 0400 by Wanda Aguirre RN  Safety Promotion/Fall Prevention: safety round/check completed  Taken 4/23/2024 0200 by Wanda Aguirre RN  Safety Promotion/Fall Prevention: safety round/check completed  Taken 4/23/2024 0000 by Wanda Aguirre RN  Safety Promotion/Fall Prevention: safety round/check completed  Taken 4/22/2024 2200 by Wanda Aguirre RN  Safety Promotion/Fall Prevention: safety round/check completed  Taken 4/22/2024 2100 by Wanda Aguirre RN  Safety Promotion/Fall Prevention: safety round/check completed  Taken 4/22/2024 2000 by Wanda Aguirre RN  Safety Promotion/Fall Prevention: safety round/check completed  Taken 4/22/2024 1900 by Wanda Aguirre RN  Safety Promotion/Fall Prevention: safety round/check completed   Goal Outcome Evaluation:

## 2024-04-23 NOTE — THERAPY DISCHARGE NOTE
Acute Care - Physical Therapy Discharge Summary  [facility]       Patient Name: Tawny Shin  : 1955  MRN: [id number]    Today's Date: 2024                 Admit Date: 2024      PT Recommendation and Plan    Visit Dx:    ICD-10-CM ICD-9-CM   1. Acute UTI  N39.0 599.0   2. Fever, unspecified fever cause  R50.9 780.60   3. Chronic renal impairment, unspecified CKD stage  N18.9 585.9   4. Hypokalemia  E87.6 276.8   5. Cellulitis of right leg  L03.115 682.6   6. Calculus of gallbladder without cholecystitis without obstruction  K80.20 574.20   7. Nodule of buttock  R22.2 782.2   8. Impaired mobility [Z74.09]  Z74.09 799.89                PT Rehab Goals       Row Name 24 1600             Bed Mobility Goal 1 (PT)    Activity/Assistive Device (Bed Mobility Goal 1, PT) scooting  -TB      [place] Level/Cues Needed (Bed Mobility Goal 1, PT) contact guard required  -TB      Time Frame (Bed Mobility Goal 1, PT) long term goal (LTG);10 days  -TB      Progress/Outcomes (Bed Mobility Goal 1, PT) goal not met  -TB         Transfer Goal 1 (PT)    Activity/Assistive Device (Transfer Goal 1, PT) sit-to-stand/stand-to-sit;bed-to-chair/chair-to-bed  -TB      [place] Level/Cues Needed (Transfer Goal 1, PT) contact guard required;standby assist  -TB      Time Frame (Transfer Goal 1, PT) long term goal (LTG);10 days  -TB      Progress/Outcome (Transfer Goal 1, PT) goal not met  -TB         Gait Training Goal 1 (PT)    Activity/Assistive Device (Gait Training Goal 1, PT) gait (walking locomotion);assistive device use;decrease fall risk;improve balance and speed;increase endurance/gait distance;increase energy conservation;walker, rolling  -TB      [place] Level (Gait Training Goal 1, PT) contact guard required;standby assist  -TB      Distance (Gait Training Goal 1, PT) 15 ft  -TB      Time Frame (Gait Training Goal 1, PT) long term goal (LTG);10 days  -TB      Progress/Outcome (Gait Training Goal  1, PT) goal not met  -TB                User Key  (r) = Recorded By, (t) = Taken By, (c) = Cosigned By      Initials Name Provider Type Discipline    TB Minna Denise, PTA Physical Therapist Assistant PT                        PT Discharge Summary  Anticipated Discharge Disposition (PT): other (see comments)  Reason for Discharge: Discharge from facility  Outcomes Achieved: Refer to plan of care for updates on goals achieved  Discharge Destination: Home      Minna Denise PTA   4/23/2024

## 2024-04-23 NOTE — OUTREACH NOTE
Prep Survey      Flowsheet Row Responses   [facility] facility patient discharged from? [facility]   Is LACE score < 7 ? No   Eligibility [facility]   Date of Admission 04/19/24   Date of Discharge 04/23/24   Discharge Disposition Home or Self Care   Discharge diagnosis Sepsis due to Escherichia coli-Cyst of buttocks       Stage 3b chronic kidney disease       UTI (urinary tract infection   Does the patient have one of the following disease processes/diagnoses(primary or secondary)? Sepsis   Does the patient have Home health ordered? No   Is there a DME ordered? No   Prep survey completed? Yes            SARA MCDONNELL - Registered Nurse

## 2024-04-23 NOTE — ANESTHESIA PREPROCEDURE EVALUATION
Anesthesia Evaluation     Patient summary reviewed   history of anesthetic complications:  difficult airway  NPO Solid Status: > 8 hours  NPO Liquid Status: > 8 hours           Airway   Mallampati: II  TM distance: >3 FB  Neck ROM: full  No difficulty expected  Dental - normal exam     Pulmonary    (+) COPD, asthma,  Cardiovascular   Exercise tolerance: poor (<4 METS)    (+) pacemaker (Medtronic ) pacemaker, hypertension well controlled 2 medications or greater, past MI  >12 months, CAD, cardiac stents (2 stents, most recent 6 years ago) more than 12 months ago , DVT resolved, hyperlipidemia    ROS comment: Medtronic pacemaker 86% sensing    Neuro/Psych  (+) headaches, syncope, numbness, dementia  (-) seizures, TIA, CVA  GI/Hepatic/Renal/Endo    (+) renal disease- CRI, thyroid problem hypothyroidism  (-) liver disease, diabetes    ROS Comment: Admitted with weakness, found to have UTI  Also with buttock nodule    Musculoskeletal     (+) gait problem  Abdominal    Substance History - negative use     OB/GYN          Other   arthritis (rheumatoid arthritis), blood dyscrasia anemia,       Other Comment: Sjogren's                 Anesthesia Plan    ASA 3     MAC     (Have magnet in room, not necessary to place unless having issues with rhythm  Interrogate in recovery)  intravenous induction     Anesthetic plan, risks, benefits, and alternatives have been provided, discussed and informed consent has been obtained with: patient.

## 2024-04-24 ENCOUNTER — TRANSITIONAL CARE MANAGEMENT TELEPHONE ENCOUNTER (OUTPATIENT)
Dept: CALL CENTER | Facility: HOSPITAL | Age: 69
End: 2024-04-24
Payer: MEDICARE

## 2024-04-24 ENCOUNTER — NURSE TRIAGE (OUTPATIENT)
Dept: CALL CENTER | Facility: HOSPITAL | Age: 69
End: 2024-04-24
Payer: MEDICARE

## 2024-04-24 NOTE — PROGRESS NOTES
"Enter Query Response Below      Query Response:   Bacteremia by its very nature implies and confirms sepsis    Electronically signed by Nayan Hale, DO, 24, 09:41 CDT.               If applicable, please update the problem list.       Patient: Tawny Shin        : 1955  Account: [id number]           Admit Date: 2024        How to Respond to this query:       a. Click New Note     b. Answer query within the yellow box.                c. Update the Problem List, if applicable.      If you have any questions about this query contact me at: [email or web address]     ,     Risk Factors: 68-year-old female with a history of hypertension, CAD and COPD.   Clinical Indicators: Presented with fever, nausea, generalized weakness, cough and intermittent shortness of air. Found to have a UTI, right leg cellulitis and a buttock nodule. Discharge summary lists, \"Sepsis due to Escherichia coli without acute organ dysfunction\" as a discharge diagnosis. Surgery consult () reads, \"She also had a chronic right lower leg wound with erythema but was determined to not have an abscess nor septic from the cellulitis.\"  WBC 7.46 (). Lactate 1.6 (). Procalcitonin 0.09 (), glucose 185 ( @ 2056). Temperature 100.2 ( @ 0844). HR  (. Oxygen saturation 89% on room air ( @ 0844), Imputed PF ratio: 280  Treatment: ceftriaxone (), Bactrim (), cefepime (-), Vancomycin ()    After study, was sepsis clinically supported during this admission?    Sepsis was supported with additional clinical indicators:____________  Sepsis was not supported  Other- specify_____________  Unable to determine      By submitting this query, we are merely seeking further clarification of documentation to accurately reflect all conditions that you are monitoring, evaluating, treating or that extend the hospitalization or utilize additional resources of care. Please utilize " your independent clinical judgment when addressing the question(s) above.     This query and your response, once completed, will be entered into the legal medical record.    Sincerely,  Ruchi Pak RN  Clinical Documentation Integrity Program

## 2024-04-24 NOTE — OUTREACH NOTE
Call Center TCM Note      Flowsheet Row Responses   [facility] patient discharged from? [place]   Does the patient have one of the following disease processes/diagnoses(primary or secondary)? Sepsis   TCM attempt successful? No   Unsuccessful attempts Attempt 2            Candelaria Diaz RN    4/24/2024, 15:18 CDT

## 2024-04-24 NOTE — TELEPHONE ENCOUNTER
Reason for Disposition   Sutured or stapled surgical incision, questions about    Additional Information   Negative: [1] Major abdominal surgical incision AND [2] wound gaping open AND [3] visible internal organs   Negative: Sounds like a life-threatening emergency to the triager   Negative: Patient has a Negative Pressure Wound Therapy device   Negative: Patient is followed by a wound clinic or wound specialist for this wound   Negative: [1] Bleeding from incision AND [2] won't stop after 10 minutes of direct pressure   Negative: [1] Bleeding (more than a few drops) from incision AND [2] tracheostomy or blood vessel surgery (e.g., carotid endarterectomy, femoral bypass graft, kidney dialysis fistula)   Negative: [1] Widespread rash AND [2] bright red, sunburn-like   Negative: Severe pain in the incision   Negative: [1] Incision gaping open AND [2] < 48 hours since wound re-opened   Negative: [1] Incision gaping open AND [2] length of opening > 2 inches (5 cm)   Negative: Patient sounds very sick or weak to the triager   Negative: Sounds like a serious complication to the triager   Negative: Fever > 100.4 F (38.0 C)   Negative: [1] Incision looks infected (spreading redness, pain) AND [2] fever > 99.5 F (37.5 C)   Negative: [1] Incision looks infected (spreading redness, pain) AND [2] large red area (> 2 in. or 5 cm)   Negative: [1] Incision looks infected (spreading redness, pain) AND [2] face wound   Negative: [1] Red streak runs from the incision AND [2] longer than 1 inch (2.5 cm)   Negative: [1] Pus or bad-smelling fluid draining from incision AND [2] no fever   Negative: [1] Post-op pain AND [2] not controlled with pain medications   Negative: Dressing soaked with blood or body fluid (e.g., drainage)   Negative: [1] Scant bleeding (e.g., few drops) from incision AND AND [2] tracheostomy or blood vessel surgery (e.g., carotid endarterectomy, femoral bypass graft, kidney dialysis fistula)   Negative: [1] Raised  "bruise and [2] size > 2 inches (5 cm) and expanding   Negative: [1] Caller has URGENT question AND [2] triager unable to answer question   Negative: [1] INCREASING pain in incision AND [2] > 2 days (48 hours) since surgery   Negative: [1] Small red area or streak AND [2] no fever   Negative: [1] Clear or blood-tinged fluid draining from wound AND [2] no fever   Negative: [1] Incision gaping open AND [2] > 48 hours since wound re-opened AND [3] length of opening > 1/2 inch (12 mm)   Negative: [1] Incision on face gaping open AND [2] > 48 hours since wound re-opened AND [3] length of opening > 1/4 inch (6 mm)   Negative: Suture or staple removal is overdue   Negative: [1] Suture or staple came out early AND [2] caller wants wound checked   Negative: [1] Caller has NON-URGENT question AND [2] triager unable to answer question   Negative: Pimple where a stitch comes through the skin   Negative: Suture (or staple) came out early   Negative: Mild bruising near incision site   Negative: Suture removal date, questions about   Negative: Skin tape (e.g., Steri-Strips) removal, questions about    Answer Assessment - Initial Assessment Questions  1. SYMPTOM: \"What's the main symptom you're concerned about?\" (e.g., drainage, incision opening up, pain, redness)      Post op incision care  2. ONSET: \"When did   start?\"      On 4/11 surgery  3. SURGERY: \"What surgery did you have?\"      Skin abscess   4. DATE of SURGERY: \"When was the surgery?\"       [id number]  5. INCISION SITE: \"Where is the incision located?\"       buttock  6. REDNESS: \"Is there any redness at the incision site?\" If Yes, ask: \"How wide across is the redness?\" (Inches, centimeters)       denies  7. PAIN: \"Is there any pain?\" If Yes, ask: \"How bad is it?\"  (Scale 1-10; or mild, moderate, severe)    - NONE (0): no pain    - MILD (1-3): doesn't interfere with normal activities     - MODERATE (4-7): interferes with normal activities or awakens from sleep     - SEVERE " "(8-10): excruciating pain, unable to do any normal activities      mild  8. BLEEDING: \"Is there any bleeding?\" If Yes, ask: \"How much?\" and \"Where?\"      denies  9. DRAINAGE: \"Is there any drainage from the incision site?\" If Yes, ask: \"What color and how much?\" (e.g., red, cloudy, pus; drops, teaspoon)      denies  10. FEVER: \"Do you have a fever?\" If Yes, ask: \"What is your temperature, how was it measured, and when did it start?\"        denies  11. OTHER SYMPTOMS: \"Do you have any other symptoms?\" (e.g., dizziness, rash elsewhere on body, shaking chills, weakness)        denies    Protocols used: Post-Op Incision Symptoms and Questions-ADULT-    "

## 2024-04-24 NOTE — OUTREACH NOTE
Call Center TCM Note      Flowsheet Row Responses   [facility] patient discharged from? [place]   Does the patient have one of the following disease processes/diagnoses(primary or secondary)? Sepsis   TCM attempt successful? No   Unsuccessful attempts Attempt 1            Candelaria Diaz RN    4/24/2024, 14:49 CDT

## 2024-04-25 ENCOUNTER — TRANSITIONAL CARE MANAGEMENT TELEPHONE ENCOUNTER (OUTPATIENT)
Dept: CALL CENTER | Facility: HOSPITAL | Age: 69
End: 2024-04-25
Payer: MEDICARE

## 2024-04-25 LAB — BACTERIA SPEC AEROBE CULT: NORMAL

## 2024-04-25 NOTE — OUTREACH NOTE
Call Center TCM Note      Flowsheet Row Responses   [facility] patient discharged from? [place]   Does the patient have one of the following disease processes/diagnoses(primary or secondary)? Sepsis   TCM attempt successful? No   Unsuccessful attempts Attempt 3   Call Status Left message            Grace Reveles RN    4/25/2024, 15:44 CDT

## 2024-04-26 LAB
BACTERIA SPEC AEROBE CULT: ABNORMAL
BACTERIA SPEC AEROBE CULT: ABNORMAL
GRAM STN SPEC: ABNORMAL

## 2024-04-28 LAB
QT INTERVAL: 362 MS
QTC INTERVAL: 401 MS

## 2024-04-30 ENCOUNTER — TREATMENT (OUTPATIENT)
Dept: PHYSICAL THERAPY | Facility: CLINIC | Age: 69
End: 2024-04-30
Payer: MEDICARE

## 2024-04-30 DIAGNOSIS — G82.20 LOWER PARAPLEGIA: Primary | ICD-10-CM

## 2024-04-30 DIAGNOSIS — F44.4 FUNCTIONAL NEUROLOGICAL SYMPTOM DISORDER WITH WEAKNESS OR PARALYSIS: ICD-10-CM

## 2024-04-30 PROCEDURE — 97116 GAIT TRAINING THERAPY: CPT

## 2024-04-30 PROCEDURE — 97164 PT RE-EVAL EST PLAN CARE: CPT

## 2024-04-30 PROCEDURE — 97110 THERAPEUTIC EXERCISES: CPT

## 2024-04-30 NOTE — PROGRESS NOTES
"                                                                Physical Therapy Treatment Note and ReEvaluation Note  [address] [place] [address]    Patient: Tawny Shin                                                 Visit Date: 2024  :     1955    Referring practitioner:    Moises Oliveira MD  Date of Initial Visit:          Type: THERAPY  Noted: 2023    Patient seen for 77 sessions    Visit Diagnoses:    ICD-10-CM ICD-9-CM   1. Lower paraplegia  G82.20 344.1   2. Functional neurological symptom disorder with weakness or paralysis  F44.4 300.11       SUBJECTIVE     Subjective Evaluation    History of Present Illness  Mechanism of injury: Per documentation on 2024:     \"Sepsis due to Escherichia coli without acute organ dysfunction  Cyst of buttocks  E coli bacteremia    When cultures revealed pansensitive E. coli, the patient was transitioned to IV Rocephin.  She complained of pain in her right buttock and there was noted to be a golf ball sized mass in the lateral, lower buttock.  General surgery was consulted and scheduled the patient for surgical excision which was done successfully as an inpatient.  Eliquis was held preoperatively.  The patient is doing well from an infectious disease standpoint.  IV antibiot were to be transition to oral antibiotics at the time of discharge.  As noted above,ics urine culture revealed pansensitive E. coli.  Blood culture also revealed pansensitive E. coli.  MRSA nasal swab was positive however was felt to be a colonizer.  Repeat blood cultures performed on  showed no growth at 2 days indicating successful treatment of urinary tract infection.  The patient is stable for discharge today and is to follow-up with her PCP next week.  No surgical follow-up has been requested by general surgery.  Her PCP can follow-up to assess wound healing at the surgical site.\"            PAIN: 0/10         OBJECTIVE     Objective   .  Gait Training       "    [id number]   Task/Terrain Asst AD Comments   Re-eval, see goal grid      Timed Minutes 12      Therapeutic Exercises    [id number] Units Comments   Re-eval, see goal grid     Seated hip ABD resistance at feet 15 B Yellow TB   Seated bicycle resistance at feet 15 B Yellow TB   Timed Minutes 33        Therapy Education/Self Care [id number]   Education offered today    [employer] Code    Ongoing HEP   Access code: [email or web address]     Date: 10/12/2023  Prepared by: Maria R Hicks     Bed:  - Prone Knee Extension with Ankle Weight  - 2-3 x daily - 7 x weekly - 30-60 seconds hold  - Supine Bridge  - 2 x daily - 7 x weekly - 2 sets - 10 reps  - Supine March  - 1-2 x daily - 7 x weekly - 2 sets - 10 reps  - Supine Active Straight Leg Raise  - 1-2 x daily - 7 x weekly - 2 sets - 10 reps     Chair  - Seated Hamstring Stretch with Chair  - 1-2 x daily - 7 x weekly - 2 sets - 10 reps  - Belt Assisted Dorsiflexion   - 1-2 x daily - 7 x weekly - 2 sets - 10 reps  - Standing Bilateral Gastroc Stretch with Step  - 1-2 x daily - 7 x weekly - 2 sets - 10 reps  - Seated Marching with Opposite Shoulder Flexion  - 1-2 x daily - 7 x weekly - 2 sets - 10 reps  - Seated Long Arc Quad  - 1-2 x daily - 7 x weekly - 2 sets - 10 reps  - Seated Gluteal Sets  - 1-2 x daily - 7 x weekly - 2 sets - 10 reps  - seated clamshells  - 1-2 x daily - 7 x weekly - 2 sets - 10 reps   Timed Minutes        Total Timed Treatment:     45   mins  Total Time of Visit:             45   mins         ASSESSMENT/PLAN     GOALS  Goals                                            Progress Note due by 5/16/2024                                                             Recert due by 5/20/2024   LTG by: 12 weeks Comments Date Status   Patient will report compliance with HEP.  Reports compliance 3x/week  3/26 ongoing   Pt will be able to ambulate >/=75' w/ LRAD in one continuous bout in </=5 mins, 0 rest breaks to improve capacity for safe household ambulation  max of 24' on  "3/12/24  25 ft 3 in max on 3/26/2024  4/30 (re-eval): 4', 5' 4/30 ongoing   Pt will stand unsupported for >/=5-10 mins to be able to perform tasks at kitchen counter. 4/30 (re-eval): 3 secs 4/30 ongoing   Pt will be able to perform SLS while also lifting contralateral side 6\" to assist with bathtub/shower t/f Unable, improving but difficulty with R TKE during stance face, unable to lift contralateral LE.  4/4 ongoing   Pt will be able to maintain static standing w/ 50/50 Wbing for >/=3 mins w/o UE support/a to improve capacity for ADL/IADLs 4/30 (re-eval): 31 secs 4/30 ongoing   Patient to perform TUG in </=2 mins w/ FWW without LOB for improved capacity for household ambulation. [place] 1:1:36.55  [place] 2: 1:45.73  1/23:  Trial 1: 1:50   Trial 2: unable to complete      2/20: 1:05    4/30 (re-eval): unable to complete 2/20 MET   Pt will be able to perform 5x STS w/ 50/50 Wbing in </=2 mins for improved functional strength [place] 1:1:36.55  [place] 2: 1:45.73  1/23/24:  Trial 1: 1:50   Trial 2: unable to complete   2/8/24: asymmetrical WB  Trial 1: 19.43 sec  Trial 2: 15.02 sec   2/20: 16.47 secs but asymmetrical WB  3/26/24: improving, though asymmetrical WB  Trial 1: 14.36 sec  Trial 2: 12.29 sec -- 60/40 WB    4/30 (re-eval): 27 secs w/ heavy UE assist and multiple attempts required 4/30 Partially met;  improving   Improve R ankle DF to neutral 9/28/20023  3 deg from neutral PROM, no change   1 deg past neutral PROM  10/31 MET   Improve gross B LE strength to >/=4/5 See table  12/28 MET       Assessment/Plan     ASSESSMENT: Re-eval performed d/t recent hospital admission. She demo's a decline in functional mobility as a result of several days of immobilization and infectious processes. She fatigued quickly during trials of ambulation, required heavy UE assist for STS and stand pivot tx w/ FWW d/t functional weakness, and exhibited overall decreased activity tolerance. She will likely progress back to where she was " prior to the hospital admission though this may take several weeks.    PLAN: Consider hip ext stretches and continue to progress symmetrical WB.     SIGNATURE: Molly Garrett, YESI DPT, [place] License #: [id number]  Electronically Signed on 4/30/2024        Donnie Cisneros. [id number]  [phone]      Clinical Progress: worse  Home Program Compliance: Yes  Progress toward previous goals: Partially Met      Re-Evaluation   Certification Period: 4/30/2024 through 7/28/2024  I certify that the therapy services are furnished while this patient is under my care.  The services outlined above are required by this patient, and will be reviewed every 90 days.     PHYSICIAN: Moises Oliveira MD (NPI: [id number])    Signature:___________________________________________DATE: _________    Please sign and return via fax to [phone].   Thank you so much for letting us work with Tawny. I appreciate your letting us work with your patients. If you have any questions or concerns, please don't hesitate to contact me.

## 2024-05-01 ENCOUNTER — OFFICE VISIT (OUTPATIENT)
Dept: INTERNAL MEDICINE | Facility: CLINIC | Age: 69
End: 2024-05-01
Payer: MEDICARE

## 2024-05-01 VITALS
SYSTOLIC BLOOD PRESSURE: 90 MMHG | DIASTOLIC BLOOD PRESSURE: 60 MMHG | TEMPERATURE: 98.6 F | OXYGEN SATURATION: 97 % | HEIGHT: 66 IN | HEART RATE: 76 BPM | BODY MASS INDEX: 35.56 KG/M2

## 2024-05-01 DIAGNOSIS — N28.9 RENAL INSUFFICIENCY: ICD-10-CM

## 2024-05-01 DIAGNOSIS — D50.9 IRON DEFICIENCY ANEMIA, UNSPECIFIED IRON DEFICIENCY ANEMIA TYPE: Primary | ICD-10-CM

## 2024-05-01 DIAGNOSIS — N39.0 ACUTE UTI: ICD-10-CM

## 2024-05-01 DIAGNOSIS — I87.2 VENOUS INSUFFICIENCY: ICD-10-CM

## 2024-05-01 DIAGNOSIS — L03.90 CELLULITIS, UNSPECIFIED CELLULITIS SITE: ICD-10-CM

## 2024-05-01 PROCEDURE — 1126F AMNT PAIN NOTED NONE PRSNT: CPT | Performed by: INTERNAL MEDICINE

## 2024-05-01 PROCEDURE — 99495 TRANSJ CARE MGMT MOD F2F 14D: CPT | Performed by: INTERNAL MEDICINE

## 2024-05-01 PROCEDURE — 3074F SYST BP LT 130 MM HG: CPT | Performed by: INTERNAL MEDICINE

## 2024-05-01 PROCEDURE — 3078F DIAST BP <80 MM HG: CPT | Performed by: INTERNAL MEDICINE

## 2024-05-01 PROCEDURE — 1111F DSCHRG MED/CURRENT MED MERGE: CPT | Performed by: INTERNAL MEDICINE

## 2024-05-01 RX ORDER — DOXYCYCLINE HYCLATE 100 MG/1
100 CAPSULE ORAL 2 TIMES DAILY
Qty: 10 CAPSULE | Refills: 0 | Status: SHIPPED | OUTPATIENT
Start: 2024-05-01 | End: 2024-05-09 | Stop reason: HOSPADM

## 2024-05-02 ENCOUNTER — HOSPITAL ENCOUNTER (INPATIENT)
Facility: HOSPITAL | Age: 69
LOS: 6 days | Discharge: HOME | DRG: 690 | End: 2024-05-09
Attending: FAMILY MEDICINE | Admitting: FAMILY MEDICINE
Payer: MEDICARE

## 2024-05-02 ENCOUNTER — TELEPHONE (OUTPATIENT)
Dept: INTERNAL MEDICINE | Facility: CLINIC | Age: 69
End: 2024-05-02
Payer: MEDICARE

## 2024-05-02 ENCOUNTER — APPOINTMENT (OUTPATIENT)
Dept: GENERAL RADIOLOGY | Facility: HOSPITAL | Age: 69
DRG: 690 | End: 2024-05-02
Payer: MEDICARE

## 2024-05-02 DIAGNOSIS — N39.0 URINARY TRACT INFECTION WITHOUT HEMATURIA, SITE UNSPECIFIED: ICD-10-CM

## 2024-05-02 DIAGNOSIS — R53.1 WEAKNESS: Primary | ICD-10-CM

## 2024-05-02 DIAGNOSIS — R13.10 DYSPHAGIA, UNSPECIFIED TYPE: ICD-10-CM

## 2024-05-02 DIAGNOSIS — R53.1 GENERALIZED WEAKNESS: ICD-10-CM

## 2024-05-02 PROBLEM — R33.8 ACUTE URINARY RETENTION: Status: ACTIVE | Noted: 2024-05-02

## 2024-05-02 PROBLEM — Z87.898 HISTORY OF URINARY RETENTION: Status: ACTIVE | Noted: 2024-05-02

## 2024-05-02 PROBLEM — G83.9 PARALYSIS: Status: ACTIVE | Noted: 2024-05-02

## 2024-05-02 LAB
ALBUMIN SERPL-MCNC: 3.3 G/DL (ref 3.5–5.2)
ALBUMIN SERPL-MCNC: 3.5 G/DL (ref 3.5–5.2)
ALBUMIN/GLOB SERPL: 0.9 G/DL
ALBUMIN/GLOB SERPL: 1.3 G/DL
ALP SERPL-CCNC: 68 U/L (ref 39–117)
ALP SERPL-CCNC: 76 U/L (ref 39–117)
ALT SERPL W P-5'-P-CCNC: 24 U/L (ref 1–33)
ALT SERPL-CCNC: 24 U/L (ref 1–33)
ANION GAP SERPL CALCULATED.3IONS-SCNC: 11 MMOL/L (ref 5–15)
APTT PPP: 68 SECONDS (ref 24.5–36)
AST SERPL-CCNC: 21 U/L (ref 1–32)
AST SERPL-CCNC: 26 U/L (ref 1–32)
BACTERIA UR QL AUTO: ABNORMAL /HPF
BASOPHILS # BLD AUTO: 0.04 10*3/MM3 (ref 0–0.2)
BASOPHILS # BLD AUTO: 0.04 10*3/MM3 (ref 0–0.2)
BASOPHILS NFR BLD AUTO: 0.5 % (ref 0–1.5)
BASOPHILS NFR BLD AUTO: 0.6 % (ref 0–1.5)
BILIRUB SERPL-MCNC: 0.3 MG/DL (ref 0–1.2)
BILIRUB SERPL-MCNC: 0.4 MG/DL (ref 0–1.2)
BILIRUB UR QL STRIP: NEGATIVE
BUN SERPL-MCNC: 26 MG/DL (ref 8–23)
BUN SERPL-MCNC: 29 MG/DL (ref 8–23)
BUN/CREAT SERPL: 15.3 (ref 7–25)
BUN/CREAT SERPL: 16.5 (ref 7–25)
CALCIUM SERPL-MCNC: 9.3 MG/DL (ref 8.6–10.5)
CALCIUM SPEC-SCNC: 9.3 MG/DL (ref 8.6–10.5)
CHLORIDE SERPL-SCNC: 98 MMOL/L (ref 98–107)
CHLORIDE SERPL-SCNC: 99 MMOL/L (ref 98–107)
CLARITY UR: ABNORMAL
CO2 SERPL-SCNC: 24.4 MMOL/L (ref 22–29)
CO2 SERPL-SCNC: 29 MMOL/L (ref 22–29)
COLOR UR: YELLOW
CREAT SERPL-MCNC: 1.58 MG/DL (ref 0.57–1)
CREAT SERPL-MCNC: 1.89 MG/DL (ref 0.57–1)
D DIMER PPP FEU-MCNC: 1.25 MCGFEU/ML (ref 0–0.7)
D-LACTATE SERPL-SCNC: 0.9 MMOL/L (ref 0.5–2)
DEPRECATED RDW RBC AUTO: 58.6 FL (ref 37–54)
EGFRCR SERPLBLD CKD-EPI 2021: 28.3 ML/MIN/1.73
EGFRCR SERPLBLD CKD-EPI 2021: 35.1 ML/MIN/1.73
EOSINOPHIL # BLD AUTO: 0.05 10*3/MM3 (ref 0–0.4)
EOSINOPHIL # BLD AUTO: 0.08 10*3/MM3 (ref 0–0.4)
EOSINOPHIL NFR BLD AUTO: 0.7 % (ref 0.3–6.2)
EOSINOPHIL NFR BLD AUTO: 1 % (ref 0.3–6.2)
ERYTHROCYTE [DISTWIDTH] IN BLOOD BY AUTOMATED COUNT: 16.1 % (ref 12.3–15.4)
ERYTHROCYTE [DISTWIDTH] IN BLOOD BY AUTOMATED COUNT: 17 % (ref 12.3–15.4)
FERRITIN SERPL-MCNC: 333 NG/ML (ref 13–150)
FLUAV RNA RESP QL NAA+PROBE: NOT DETECTED
FLUBV RNA RESP QL NAA+PROBE: NOT DETECTED
GEN 5 2HR TROPONIN T REFLEX: 50 NG/L
GLOBULIN SER CALC-MCNC: 2.8 GM/DL
GLOBULIN UR ELPH-MCNC: 3.6 GM/DL
GLUCOSE SERPL-MCNC: 110 MG/DL (ref 65–99)
GLUCOSE SERPL-MCNC: 175 MG/DL (ref 65–99)
GLUCOSE UR STRIP-MCNC: NEGATIVE MG/DL
HCT VFR BLD AUTO: 31.6 % (ref 34–46.6)
HCT VFR BLD AUTO: 33 % (ref 34–46.6)
HGB BLD-MCNC: 10.4 G/DL (ref 12–15.9)
HGB BLD-MCNC: 9.7 G/DL (ref 12–15.9)
HGB UR QL STRIP.AUTO: ABNORMAL
IMM GRANULOCYTES # BLD AUTO: 0.09 10*3/MM3 (ref 0–0.05)
IMM GRANULOCYTES # BLD AUTO: 0.16 10*3/MM3 (ref 0–0.05)
IMM GRANULOCYTES NFR BLD AUTO: 1.1 % (ref 0–0.5)
IMM GRANULOCYTES NFR BLD AUTO: 2.3 % (ref 0–0.5)
INR PPP: 1.56 (ref 0.91–1.09)
IRON SATN MFR SERPL: 14 % (ref 20–50)
IRON SERPL-MCNC: 36 MCG/DL (ref 37–145)
KETONES UR QL STRIP: NEGATIVE
LEUKOCYTE ESTERASE UR QL STRIP.AUTO: ABNORMAL
LYMPHOCYTES # BLD AUTO: 1.79 10*3/MM3 (ref 0.7–3.1)
LYMPHOCYTES # BLD AUTO: 2.13 10*3/MM3 (ref 0.7–3.1)
LYMPHOCYTES NFR BLD AUTO: 26.2 % (ref 19.6–45.3)
LYMPHOCYTES NFR BLD AUTO: 26.5 % (ref 19.6–45.3)
MAGNESIUM SERPL-MCNC: 1.7 MG/DL (ref 1.6–2.4)
MAGNESIUM SERPL-MCNC: 1.8 MG/DL (ref 1.6–2.4)
MCH RBC QN AUTO: 29.6 PG (ref 26.6–33)
MCH RBC QN AUTO: 30.1 PG (ref 26.6–33)
MCHC RBC AUTO-ENTMCNC: 30.7 G/DL (ref 31.5–35.7)
MCHC RBC AUTO-ENTMCNC: 31.5 G/DL (ref 31.5–35.7)
MCV RBC AUTO: 95.7 FL (ref 79–97)
MCV RBC AUTO: 96.3 FL (ref 79–97)
MONOCYTES # BLD AUTO: 0.66 10*3/MM3 (ref 0.1–0.9)
MONOCYTES # BLD AUTO: 1.09 10*3/MM3 (ref 0.1–0.9)
MONOCYTES NFR BLD AUTO: 13.6 % (ref 5–12)
MONOCYTES NFR BLD AUTO: 9.6 % (ref 5–12)
NEUTROPHILS # BLD AUTO: 4.14 10*3/MM3 (ref 1.7–7)
NEUTROPHILS NFR BLD AUTO: 4.6 10*3/MM3 (ref 1.7–7)
NEUTROPHILS NFR BLD AUTO: 57.3 % (ref 42.7–76)
NEUTROPHILS NFR BLD AUTO: 60.6 % (ref 42.7–76)
NITRITE UR QL STRIP: POSITIVE
NRBC BLD AUTO-RTO: 0.4 /100 WBC (ref 0–0.2)
NRBC BLD AUTO-RTO: 0.7 /100 WBC (ref 0–0.2)
NT-PROBNP SERPL-MCNC: 1900 PG/ML (ref 0–900)
PH UR STRIP.AUTO: 6 [PH] (ref 5–8)
PLATELET # BLD AUTO: 168 10*3/MM3 (ref 140–450)
PLATELET # BLD AUTO: 200 10*3/MM3 (ref 140–450)
PMV BLD AUTO: 11.6 FL (ref 6–12)
POTASSIUM SERPL-SCNC: 3.1 MMOL/L (ref 3.5–5.2)
POTASSIUM SERPL-SCNC: 3.5 MMOL/L (ref 3.5–5.2)
PROT SERPL-MCNC: 6.3 G/DL (ref 6–8.5)
PROT SERPL-MCNC: 6.9 G/DL (ref 6–8.5)
PROT UR QL STRIP: ABNORMAL
PROTHROMBIN TIME: 19.3 SECONDS (ref 11.8–14.8)
RBC # BLD AUTO: 3.28 10*6/MM3 (ref 3.77–5.28)
RBC # BLD AUTO: 3.45 10*6/MM3 (ref 3.77–5.28)
RBC # UR STRIP: ABNORMAL /HPF
REF LAB TEST METHOD: ABNORMAL
RSV RNA RESP QL NAA+PROBE: NOT DETECTED
SARS-COV-2 RNA RESP QL NAA+PROBE: NOT DETECTED
SODIUM SERPL-SCNC: 138 MMOL/L (ref 136–145)
SODIUM SERPL-SCNC: 139 MMOL/L (ref 136–145)
SP GR UR STRIP: 1.02 (ref 1–1.03)
SQUAMOUS #/AREA URNS HPF: ABNORMAL /HPF
TIBC SERPL-MCNC: 258 MCG/DL
TROPONIN T DELTA: -6 NG/L
TROPONIN T SERPL HS-MCNC: 56 NG/L
UIBC SERPL-MCNC: 222 MCG/DL (ref 112–346)
UROBILINOGEN UR QL STRIP: ABNORMAL
WBC # BLD AUTO: 6.84 10*3/MM3 (ref 3.4–10.8)
WBC # UR STRIP: ABNORMAL /HPF
WBC NRBC COR # BLD AUTO: 8.03 10*3/MM3 (ref 3.4–10.8)

## 2024-05-02 PROCEDURE — 84484 ASSAY OF TROPONIN QUANT: CPT | Performed by: FAMILY MEDICINE

## 2024-05-02 PROCEDURE — 87040 BLOOD CULTURE FOR BACTERIA: CPT | Performed by: FAMILY MEDICINE

## 2024-05-02 PROCEDURE — 87086 URINE CULTURE/COLONY COUNT: CPT | Performed by: FAMILY MEDICINE

## 2024-05-02 PROCEDURE — 93005 ELECTROCARDIOGRAM TRACING: CPT | Performed by: FAMILY MEDICINE

## 2024-05-02 PROCEDURE — 83880 ASSAY OF NATRIURETIC PEPTIDE: CPT | Performed by: FAMILY MEDICINE

## 2024-05-02 PROCEDURE — 87186 SC STD MICRODIL/AGAR DIL: CPT | Performed by: FAMILY MEDICINE

## 2024-05-02 PROCEDURE — 81001 URINALYSIS AUTO W/SCOPE: CPT | Performed by: FAMILY MEDICINE

## 2024-05-02 PROCEDURE — 87154 CUL TYP ID BLD PTHGN 6+ TRGT: CPT | Performed by: FAMILY MEDICINE

## 2024-05-02 PROCEDURE — 83605 ASSAY OF LACTIC ACID: CPT | Performed by: FAMILY MEDICINE

## 2024-05-02 PROCEDURE — 85610 PROTHROMBIN TIME: CPT | Performed by: FAMILY MEDICINE

## 2024-05-02 PROCEDURE — 85025 COMPLETE CBC W/AUTO DIFF WBC: CPT | Performed by: FAMILY MEDICINE

## 2024-05-02 PROCEDURE — 87081 CULTURE SCREEN ONLY: CPT | Performed by: FAMILY MEDICINE

## 2024-05-02 PROCEDURE — 99285 EMERGENCY DEPT VISIT HI MDM: CPT

## 2024-05-02 PROCEDURE — 36415 COLL VENOUS BLD VENIPUNCTURE: CPT

## 2024-05-02 PROCEDURE — 25010000002 CEFEPIME PER 500 MG: Performed by: FAMILY MEDICINE

## 2024-05-02 PROCEDURE — 87637 SARSCOV2&INF A&B&RSV AMP PRB: CPT | Performed by: FAMILY MEDICINE

## 2024-05-02 PROCEDURE — 80053 COMPREHEN METABOLIC PANEL: CPT | Performed by: FAMILY MEDICINE

## 2024-05-02 PROCEDURE — 87077 CULTURE AEROBIC IDENTIFY: CPT | Performed by: FAMILY MEDICINE

## 2024-05-02 PROCEDURE — 85379 FIBRIN DEGRADATION QUANT: CPT | Performed by: FAMILY MEDICINE

## 2024-05-02 PROCEDURE — G0378 HOSPITAL OBSERVATION PER HR: HCPCS

## 2024-05-02 PROCEDURE — 25810000003 SODIUM CHLORIDE 0.9 % SOLUTION: Performed by: FAMILY MEDICINE

## 2024-05-02 PROCEDURE — 83735 ASSAY OF MAGNESIUM: CPT | Performed by: FAMILY MEDICINE

## 2024-05-02 PROCEDURE — P9612 CATHETERIZE FOR URINE SPEC: HCPCS

## 2024-05-02 PROCEDURE — 85730 THROMBOPLASTIN TIME PARTIAL: CPT | Performed by: FAMILY MEDICINE

## 2024-05-02 PROCEDURE — 71045 X-RAY EXAM CHEST 1 VIEW: CPT

## 2024-05-02 RX ORDER — ONDANSETRON 2 MG/ML
4 INJECTION INTRAMUSCULAR; INTRAVENOUS EVERY 6 HOURS PRN
Status: DISCONTINUED | OUTPATIENT
Start: 2024-05-02 | End: 2024-05-09 | Stop reason: HOSPADM

## 2024-05-02 RX ORDER — ACETAMINOPHEN 160 MG/5ML
650 SOLUTION ORAL EVERY 4 HOURS PRN
Status: DISCONTINUED | OUTPATIENT
Start: 2024-05-02 | End: 2024-05-09 | Stop reason: HOSPADM

## 2024-05-02 RX ORDER — NITROGLYCERIN 0.4 MG/1
0.4 TABLET SUBLINGUAL
Status: DISCONTINUED | OUTPATIENT
Start: 2024-05-02 | End: 2024-05-09 | Stop reason: HOSPADM

## 2024-05-02 RX ORDER — LEFLUNOMIDE 20 MG/1
20 TABLET ORAL NIGHTLY
Status: DISCONTINUED | OUTPATIENT
Start: 2024-05-02 | End: 2024-05-09 | Stop reason: HOSPADM

## 2024-05-02 RX ORDER — ACETAMINOPHEN 325 MG/1
650 TABLET ORAL EVERY 4 HOURS PRN
Status: DISCONTINUED | OUTPATIENT
Start: 2024-05-02 | End: 2024-05-09 | Stop reason: HOSPADM

## 2024-05-02 RX ORDER — BISACODYL 5 MG/1
5 TABLET, DELAYED RELEASE ORAL DAILY PRN
Status: DISCONTINUED | OUTPATIENT
Start: 2024-05-02 | End: 2024-05-09 | Stop reason: HOSPADM

## 2024-05-02 RX ORDER — POTASSIUM CHLORIDE 20 MEQ/1
20 TABLET, EXTENDED RELEASE ORAL DAILY
Qty: 7 TABLET | Refills: 0 | Status: ON HOLD | OUTPATIENT
Start: 2024-05-02

## 2024-05-02 RX ORDER — ACETAMINOPHEN 650 MG/1
650 SUPPOSITORY RECTAL EVERY 4 HOURS PRN
Status: DISCONTINUED | OUTPATIENT
Start: 2024-05-02 | End: 2024-05-09 | Stop reason: HOSPADM

## 2024-05-02 RX ORDER — SODIUM CHLORIDE 0.9 % (FLUSH) 0.9 %
10 SYRINGE (ML) INJECTION EVERY 12 HOURS SCHEDULED
Status: DISCONTINUED | OUTPATIENT
Start: 2024-05-02 | End: 2024-05-09 | Stop reason: HOSPADM

## 2024-05-02 RX ORDER — SODIUM CHLORIDE 0.9 % (FLUSH) 0.9 %
10 SYRINGE (ML) INJECTION AS NEEDED
Status: DISCONTINUED | OUTPATIENT
Start: 2024-05-02 | End: 2024-05-09 | Stop reason: HOSPADM

## 2024-05-02 RX ORDER — ISOSORBIDE MONONITRATE 60 MG/1
60 TABLET, EXTENDED RELEASE ORAL DAILY
Status: DISCONTINUED | OUTPATIENT
Start: 2024-05-03 | End: 2024-05-09 | Stop reason: HOSPADM

## 2024-05-02 RX ORDER — FUROSEMIDE 40 MG/1
40 TABLET ORAL DAILY
Status: DISCONTINUED | OUTPATIENT
Start: 2024-05-03 | End: 2024-05-08

## 2024-05-02 RX ORDER — ASPIRIN 81 MG/1
81 TABLET ORAL DAILY
Status: DISCONTINUED | OUTPATIENT
Start: 2024-05-03 | End: 2024-05-09 | Stop reason: HOSPADM

## 2024-05-02 RX ORDER — DULOXETIN HYDROCHLORIDE 30 MG/1
60 CAPSULE, DELAYED RELEASE ORAL DAILY
Status: DISCONTINUED | OUTPATIENT
Start: 2024-05-03 | End: 2024-05-09 | Stop reason: HOSPADM

## 2024-05-02 RX ORDER — PREDNISONE 5 MG/1
5 TABLET ORAL DAILY
Status: DISCONTINUED | OUTPATIENT
Start: 2024-05-03 | End: 2024-05-09 | Stop reason: HOSPADM

## 2024-05-02 RX ORDER — BISACODYL 10 MG
10 SUPPOSITORY, RECTAL RECTAL DAILY PRN
Status: DISCONTINUED | OUTPATIENT
Start: 2024-05-02 | End: 2024-05-09 | Stop reason: HOSPADM

## 2024-05-02 RX ORDER — ONDANSETRON 4 MG/1
4 TABLET, ORALLY DISINTEGRATING ORAL EVERY 6 HOURS PRN
Status: DISCONTINUED | OUTPATIENT
Start: 2024-05-02 | End: 2024-05-09 | Stop reason: HOSPADM

## 2024-05-02 RX ORDER — AMOXICILLIN 250 MG
2 CAPSULE ORAL 2 TIMES DAILY PRN
Status: DISCONTINUED | OUTPATIENT
Start: 2024-05-02 | End: 2024-05-09 | Stop reason: HOSPADM

## 2024-05-02 RX ORDER — FOLIC ACID 1 MG/1
1 TABLET ORAL DAILY
Status: DISCONTINUED | OUTPATIENT
Start: 2024-05-03 | End: 2024-05-09 | Stop reason: HOSPADM

## 2024-05-02 RX ORDER — LIDOCAINE 4 G/G
1 PATCH TOPICAL DAILY PRN
Status: DISCONTINUED | OUTPATIENT
Start: 2024-05-02 | End: 2024-05-09 | Stop reason: HOSPADM

## 2024-05-02 RX ORDER — CARVEDILOL 25 MG/1
25 TABLET ORAL 2 TIMES DAILY WITH MEALS
Status: DISCONTINUED | OUTPATIENT
Start: 2024-05-02 | End: 2024-05-03

## 2024-05-02 RX ORDER — POLYETHYLENE GLYCOL 3350 17 G/17G
17 POWDER, FOR SOLUTION ORAL DAILY PRN
Status: DISCONTINUED | OUTPATIENT
Start: 2024-05-02 | End: 2024-05-09 | Stop reason: HOSPADM

## 2024-05-02 RX ORDER — POTASSIUM CHLORIDE 20 MEQ/1
20 TABLET, EXTENDED RELEASE ORAL DAILY
Status: DISCONTINUED | OUTPATIENT
Start: 2024-05-03 | End: 2024-05-08

## 2024-05-02 RX ORDER — SODIUM CHLORIDE 9 MG/ML
40 INJECTION, SOLUTION INTRAVENOUS AS NEEDED
Status: DISCONTINUED | OUTPATIENT
Start: 2024-05-02 | End: 2024-05-09 | Stop reason: HOSPADM

## 2024-05-02 RX ORDER — LOSARTAN POTASSIUM 50 MG/1
50 TABLET ORAL DAILY
Status: DISCONTINUED | OUTPATIENT
Start: 2024-05-03 | End: 2024-05-07

## 2024-05-02 RX ADMIN — CARVEDILOL 25 MG: 25 TABLET, FILM COATED ORAL at 23:04

## 2024-05-02 RX ADMIN — APIXABAN 5 MG: 5 TABLET, FILM COATED ORAL at 23:04

## 2024-05-02 RX ADMIN — LEFLUNOMIDE 20 MG: 20 TABLET ORAL at 23:05

## 2024-05-02 RX ADMIN — CEFEPIME 2000 MG: 2 INJECTION, POWDER, FOR SOLUTION INTRAVENOUS at 21:33

## 2024-05-02 RX ADMIN — SODIUM CHLORIDE 1000 ML: 9 INJECTION, SOLUTION INTRAVENOUS at 19:39

## 2024-05-02 NOTE — TELEPHONE ENCOUNTER
Dr. Oliveira reviewed between patients and gave written/verbal order for K+ 20meq daily for 7 days.  States it was her right leg yesterday that was painful and not her left leg.  She is on anticoagulation.  If the caregiver is concerned about her functional status, level of pain, or level of alertness, should take to ER for evaluation.

## 2024-05-02 NOTE — ED PROVIDER NOTES
Subjective   History of Present Illness  68-year-old female is here in the emergency room today with weakness.  Patient states that she has been progressively getting weak over the last few days.  Patient states that she has no chest pain or shortness of breath.  Patient has no headache or dizziness.  Patient has no visual disturbances.  Patient states that she was recently hospitalized for urinary tract infection and she is going to her doctor's office for follow-up appointment.  Patient states that she was found to have low potassium levels.  Patient states that they tried to take some potassium pills to see if it would go up.  Patient denies any nausea vomiting.  Patient denies any abdominal pain.  Patient denies any diarrhea.  Patient denies any other symptoms at this time.      Review of Systems   Neurological:  Positive for weakness.   All other systems reviewed and are negative.      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    Elevated cholesterol     Eustachian tube dysfunction     Heart disease     Herpes simplex     History of transfusion     Hyperlipidemia     Hypertension     Hypothyroidism 12/23/2020    Intrinsic asthma 12/23/2020    Knee dislocation     Labral tear of right hip joint     Laryngitis sicca     Laryngitis, chronic     Left carotid bruit 03/09/2016    MI (myocardial infarction)     Myalgia due to statin 06/25/2019    Open wound of right hip 09/14/2021    Osteomyelitis of  "right femur 07/06/2021    Otorrhea     Pacemaker 11/17/2016    Primary osteoarthritis of left knee 12/23/2020    Psoriasis vulgaris 12/23/2020    S/P coronary artery stent placement 03/09/2016    Sensorineural hearing loss     Seropositive rheumatoid arthritis of multiple sites 12/27/2019    Overview:  -myochrysine '93-'96 -methotrexate '96--->11/98;r/s  restarted 2/99--> 8/14 (anemia) -sulfasalazine- not effective -penicillamine 6/98-->10/98; no effect -leflunomide 11/98--> - Humira '13-->didn't take - Enbrel 12/14-->3/15- no effect!   Last Assessment & Plan:  - \"aching all over\" because she had to be off her anti-rheumatic drugs for 2 weeks in preparation for her R knee surgery - he    Sick sinus syndrome 12/27/2019    Sjogren's disease     Spondylolisthesis of lumbar region 01/17/2018    Syncope, recurrent 02/08/2021    Urinary tract infection        Allergies   Allergen Reactions    Atorvastatin Other (See Comments)     LEG CRAMPS      Amoxicillin Rash    Escitalopram Rash    Nabumetone Rash    Niacin Er Rash    Penicillin G Rash    Penicillins Rash    Simvastatin Rash       Past Surgical History:   Procedure Laterality Date    A-V CARDIAC PACEMAKER INSERTION  2016    ATRIAL CARDIAC PACEMAKER INSERTION      CARDIAC CATHETERIZATION      CATARACT EXTRACTION      CERVICAL CORPECTOMY N/A 3/3/2021    Procedure: CERVICAL 6 CORPECTOMY WITH TITANIUM CAGE WITH NEURO MONITORING;  Surgeon: Bandar Shea MD;  Location: [facility];  Service: Neurosurgery;  Laterality: N/A;    COLONOSCOPY  11/08/2011    One fold in the ascending colon which showed ulcer otherwise normal exam    COLONOSCOPY  11/12/2004    Normal exam repeat in five years    CORONARY ANGIOPLASTY WITH STENT PLACEMENT      X 2; 2013 & 2014    ENDOSCOPY  07/10/2014    Normal exam    FLAP LEG Right 9/14/2021    Procedure: RIGHT GLUTEAL FASCIOCUTANEOUS ADVANCEMENT FLAP AND RIGHT TENSOR FASCIAL JESSICA FLAP;  Surgeon: Amdaeo Turner MD;  Location: [facility] " OR;  Service: Plastics;  Laterality: Right;    HIP ABDUCTION TENOTOMY BILATERAL Right 1/14/2021    Procedure: RIGHT HIP GLUTEUS MEDLUS / MINIMUS REPAIR, POSSIBLE ACHILLES ALLOGRAFT;  Surgeon: Nino Carlson MD;  Location:  [facility] OR;  Service: Orthopedics;  Laterality: Right;    INCISION AND DRAINAGE ABSCESS Right 6/4/2022    Procedure: INCISION AND DRAINAGE ABSCESS right hip;  Surgeon: Magda Salcido MD;  Location:  [facility] OR;  Service: General;  Laterality: Right;    INCISION AND DRAINAGE ABSCESS Right 6/10/2022    Procedure: RIGHT HIP INCISION AND DRAINAGE. MD NEEDS 3L VANC IRRIGATION, CURRETTES, DAICANS, KERLEX ROLLS;  Surgeon: Amadeo Turner MD;  Location:  [facility] OR;  Service: Plastics;  Laterality: Right;    INCISION AND DRAINAGE HIP Right 2/9/2021    Procedure: HIP INCISION AND DRAINAGE;  Surgeon: Nino Carlson MD;  Location:  [facility] OR;  Service: Orthopedics;  Laterality: Right;    INCISION AND DRAINAGE LEG Right 10/24/2021    Procedure: INCISION AND DRAINAGE LOWER EXTREMITY;  Surgeon: Amadeo Turner MD;  Location:  [facility] OR;  Service: Plastics;  Laterality: Right;    INCISION AND DRAINAGE OF WOUND Right 7/8/2021    Procedure: INCISION AND DRAINAGE WOUND RIGHT HIP;  Surgeon: James Huntley MD;  Location:  [facility] OR;  Service: Orthopedics;  Laterality: Right;    JOINT REPLACEMENT      KYPHOPLASTY WITH BIOPSY Bilateral 10/26/2021    Procedure: THOARCIC 12 KYPHOPLASTY WITH BIOPSY;  Surgeon: Bandar Shea MD;  Location:  [facility] OR;  Service: Neurosurgery;  Laterality: Bilateral;    LEG DEBRIDEMENT Right 9/14/2021    Procedure: DEBRIDEMENT OF RIGHT HIP WOUND, RIGHT GLUTEAL FASCIOCUTANEOUS ADVANCEMENT FLAP AND RIGHT TENSOR FASCIAL JESSICA FLAP;  Surgeon: Amadeo Turner MD;  Location:  [facility] OR;  Service: Plastics;  Laterality: Right;    LUMBAR DISCECTOMY Right 3/23/2021    Procedure: LUMBAR DISCECTOMY MICRO, Lumbar 1/2 right;  Surgeon: Bandar Shea MD;  Location:  [facility] OR;   Service: Neurosurgery;  Laterality: Right;    LUMBAR FUSION N/A 1/19/2018    Procedure: L3-4,L4-5 DECOMPRESSION, POSTERIOR SPINAL FUSION WITH INSTRUMENTATION;  Surgeon: Fortino Oropeza MD;  Location:  [facility] OR;  Service:     LUMBAR LAMINECTOMY WITH FUSION Left 1/17/2018    Procedure: LEFT L3-4 L4-5 LATERAL LUMBAR INTERBODY FUSION;  Surgeon: Fortino Oropeza MD;  Location:  [facility] OR;  Service:     MASS EXCISION Right 4/23/2024    Procedure: RIGHT BUTTOCK MASS EXCISION;  Surgeon: Moises Keyes MD;  Location:  [facility] OR;  Service: General;  Laterality: Right;    MYRINGOTOMY W/ TUBES  09/04/2014    TUBES NO LONGER IN PLACE    OTHER SURGICAL HISTORY      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 so hardware was removed and spacers were placed    REPLACEMENT TOTAL KNEE Right        Family History   Problem Relation Age of Onset    Cancer Mother         Lung cancer    Heart disease Father         Doied of heart. Attack       Social History     Socioeconomic History    Marital status:    Tobacco Use    Smoking status: Never     Passive exposure: Never    Smokeless tobacco: Never   Vaping Use    Vaping status: Never Used   Substance and Sexual Activity    Alcohol use: No    Drug use: Never    Sexual activity: Defer     Birth control/protection: Abstinence           Objective   Physical Exam  Vitals and nursing note reviewed.   Constitutional:       Appearance: Normal appearance.   HENT:      Head: Normocephalic and atraumatic.      Mouth/Throat:      Mouth: Mucous membranes are moist.   Eyes:      Extraocular Movements: Extraocular movements intact.      Pupils: Pupils are equal, round, and reactive to light.   Cardiovascular:      Rate and Rhythm: Normal rate and regular rhythm.      Heart sounds: Normal heart sounds.   Pulmonary:      Effort: Pulmonary effort is normal.      Breath sounds: Normal breath sounds.   Abdominal:      General: Bowel sounds are normal.      Palpations: Abdomen is soft.      Tenderness: There is  no abdominal tenderness.   Musculoskeletal:      Cervical back: Normal range of motion and neck supple. No tenderness.   Skin:     General: Skin is warm and dry.   Neurological:      General: No focal deficit present.      Mental Status: She is alert and oriented to person, place, and time.      Cranial Nerves: No cranial nerve deficit.      Sensory: No sensory deficit.   Psychiatric:         Mood and Affect: Mood normal.         Behavior: Behavior normal.         Procedures           ED Course  ED Course as of 05/02/24 2055   Thu May 02, 2024   1845 EKG rate 70  Normal sinus rhythm  No significant changes from previous EKG  No STEMI [RP]      ED Course User Index  [RP] Joselito Moon MD                                           Lab Results (last 24 hours)       Procedure Component Value Units Date/Time    COVID-19, FLU A/B, RSV PCR 1 HR TAT - Swab, Nasopharynx [[id number]]  (Normal) Collected: 05/02/24 1826    Specimen: Swab from Nasopharynx Updated: 05/02/24 1910     COVID19 Not Detected     Influenza A PCR Not Detected     Influenza B PCR Not Detected     RSV, PCR Not Detected    Narrative:      Fact sheet for providers: https://www.fda.gov/media/510432/download    Fact sheet for patients: https://www.fda.gov/media/987575/download    Test performed by PCR.    CBC & Differential [[id number]]  (Abnormal) Collected: 05/02/24 1905    Specimen: Blood Updated: 05/02/24 1923    Narrative:      The following orders were created for panel order CBC & Differential.  Procedure                               Abnormality         Status                     ---------                               -----------         ------                     CBC Auto Differential[[id number]]        Abnormal            Final result                 Please view results for these tests on the individual orders.    Comprehensive Metabolic Panel [[id number]]  (Abnormal) Collected: 05/02/24 1905    Specimen: Blood Updated: 05/02/24 1938     Glucose 110  "mg/dL      BUN 29 mg/dL      Creatinine 1.89 mg/dL      Sodium 139 mmol/L      Potassium 3.5 mmol/L      Comment: Slight hemolysis detected by analyzer. Result may be falsely elevated.        Chloride 99 mmol/L      CO2 29.0 mmol/L      Calcium 9.3 mg/dL      Total Protein 6.9 g/dL      Albumin 3.3 g/dL      ALT (SGPT) 24 U/L      AST (SGOT) 26 U/L      Comment: Slight hemolysis detected by analyzer. Result may be falsely elevated.        Alkaline Phosphatase 68 U/L      Total Bilirubin 0.4 mg/dL      Globulin 3.6 gm/dL      A/G Ratio 0.9 g/dL      BUN/Creatinine Ratio 15.3     Anion Gap 11.0 mmol/L      eGFR 28.3 mL/min/1.73     Narrative:      GFR Normal >60  Chronic Kidney Disease <60  Kidney Failure <15      D-dimer, Quantitative [[id number]]  (Abnormal) Collected: 05/02/24 1905    Specimen: Blood Updated: 05/02/24 1929     D-Dimer, Quantitative 1.25 MCGFEU/mL     Narrative:      According to the assay 's published package insert, a normal (<0.50 MCGFEU/mL) D-dimer result in conjunction with a non-high clinical probability assessment, excludes deep vein thrombosis (DVT) and pulmonary embolism (PE) with high sensitivity.    D-dimer values increase with age and this can make VTE exclusion of an older population difficult. To address this, the [employer], based on best available evidence and recent guidelines, recommends that clinicians use age-adjusted D-dimer thresholds in patients greater than 50 years of age with: a) a low probability of PE who do not meet all Pulmonary Embolism Rule Out Criteria, or b) in those with intermediate probability of PE.   The formula for an age-adjusted D-dimer cut-off is \"age/100\".  For example, a 60 year old patient would have an age-adjusted cut-off of 0.60 MCGFEU/mL and an 80 year old 0.80 MCGFEU/mL.    High Sensitivity Troponin T [[id number]]  (Abnormal) Collected: 05/02/24 1905    Specimen: Blood Updated: 05/02/24 1943     HS Troponin T 56 ng/L  "    Narrative:      High Sensitive Troponin T Reference Range:  <14.0 ng/L- Negative Female for AMI  <22.0 ng/L- Negative Male for AMI  >=14 - Abnormal Female indicating possible myocardial injury.  >=22 - Abnormal Male indicating possible myocardial injury.   Clinicians would have to utilize clinical acumen, EKG, Troponin, and serial changes to determine if it is an Acute Myocardial Infarction or myocardial injury due to an underlying chronic condition.         BNP [[id number]]  (Abnormal) Collected: 05/02/24 1905    Specimen: Blood Updated: 05/02/24 1932     proBNP 1,900.0 pg/mL     Narrative:      This assay is used as an aid in the diagnosis of individuals suspected of having heart failure. It can be used as an aid in the diagnosis of acute decompensated heart failure (ADHF) in patients presenting with signs and symptoms of ADHF to the emergency department (ED). In addition, NT-proBNP of <300 pg/mL indicates ADHF is not likely.    Age Range Result Interpretation  NT-proBNP Concentration (pg/mL:      <50             Positive            >450                   Gray                 300-450                    Negative             <300    50-75           Positive            >900                  Gray                300-900                  Negative            <300      >75             Positive            >1800                  Gray                300-1800                  Negative            <300    Lactic Acid, Plasma [[id number]]  (Normal) Collected: 05/02/24 1905    Specimen: Blood Updated: 05/02/24 1933     Lactate 0.9 mmol/L     Magnesium [[id number]]  (Normal) Collected: 05/02/24 1905    Specimen: Blood Updated: 05/02/24 1933     Magnesium 1.8 mg/dL     Protime-INR [[id number]]  (Abnormal) Collected: 05/02/24 1905    Specimen: Blood Updated: 05/02/24 1929     Protime 19.3 Seconds      INR 1.56    aPTT [[id number]]  (Abnormal) Collected: 05/02/24 1905    Specimen: Blood Updated: 05/02/24 1929     PTT 68.0 seconds      CBC Auto Differential [[id number]]  (Abnormal) Collected: 05/02/24 1905    Specimen: Blood Updated: 05/02/24 1923     WBC 8.03 10*3/mm3      RBC 3.28 10*6/mm3      Hemoglobin 9.7 g/dL      Hematocrit 31.6 %      MCV 96.3 fL      MCH 29.6 pg      MCHC 30.7 g/dL      RDW 17.0 %      RDW-SD 58.6 fl      MPV 11.6 fL      Platelets 168 10*3/mm3      Neutrophil % 57.3 %      Lymphocyte % 26.5 %      Monocyte % 13.6 %      Eosinophil % 1.0 %      Basophil % 0.5 %      Immature Grans % 1.1 %      Neutrophils, Absolute 4.60 10*3/mm3      Lymphocytes, Absolute 2.13 10*3/mm3      Monocytes, Absolute 1.09 10*3/mm3      Eosinophils, Absolute 0.08 10*3/mm3      Basophils, Absolute 0.04 10*3/mm3      Immature Grans, Absolute 0.09 10*3/mm3      nRBC 0.4 /100 WBC     Urinalysis With Culture If Indicated - Straight Cath [[id number]]  (Abnormal) Collected: 05/02/24 1956    Specimen: Urine from Straight Cath Updated: 05/02/24 2005     Color, UA Yellow     Appearance, UA Turbid     pH, UA 6.0     Specific Gravity, UA 1.025     Glucose, UA Negative     Ketones, UA Negative     Bilirubin, UA Negative     Blood, UA Large (3+)     Protein, UA >=300 mg/dL (3+)     Leuk Esterase, UA Large (3+)     Nitrite, UA Positive     Urobilinogen, UA 0.2 E.U./dL    Narrative:      In absence of clinical symptoms, the presence of pyuria, bacteria, and/or nitrites on the urinalysis result does not correlate with infection.    Urinalysis, Microscopic Only - Straight Cath [[id number]]  (Abnormal) Collected: 05/02/24 1956    Specimen: Urine from Straight Cath Updated: 05/02/24 2005     RBC, UA 21-50 /HPF      WBC, UA Too Numerous to Count /HPF      Bacteria, UA 3+ /HPF      Squamous Epithelial Cells, UA None Seen /HPF      Methodology Manual Light Microscopy    Urine Culture - Urine, Straight Cath [[id number]] Collected: 05/02/24 1956    Specimen: Urine from Straight Cath Updated: 05/02/24 2005          XR Chest 1 View   Final Result       No acute  cardiopulmonary abnormality.               This report was signed and finalized on 5/2/2024 6:07 PM by Marco Tovar.            Medications   sodium chloride 0.9 % flush 10 mL (has no administration in time range)   cefepime 2000 mg IVPB in 100 mL NS (MBP) (has no administration in time range)   sodium chloride 0.9 % bolus 1,000 mL (1,000 mL Intravenous New Bag 5/2/24 1939)       Medical Decision Making  68-year-old female presents emergency room with weakness.  Patient is having difficulty ambulating.  Patient has no chest pain or shortness of breath.  Patient was recently admitted to the emergency room for urinary tract infection.  Patient denies any urinary tract symptoms.  Patient has a history of neurogenic bladder.  Patient is afebrile at this time.  I discussed findings with the hospitalist on-call.  Patient be admitted to their service.  Patient was given IV fluids and cefepime here in the emergency room.    Problems Addressed:  Urinary tract infection without hematuria, site unspecified: complicated acute illness or injury  Weakness: complicated acute illness or injury    Amount and/or Complexity of Data Reviewed  Labs: ordered. Decision-making details documented in ED Course.  Radiology: ordered. Decision-making details documented in ED Course.  ECG/medicine tests: ordered. Decision-making details documented in ED Course.    Risk  Prescription drug management.  Decision regarding hospitalization.        Final diagnoses:   Weakness   Urinary tract infection without hematuria, site unspecified       ED Disposition  ED Disposition       ED Disposition   Decision to Admit    Condition   --    Comment   Level of Care: Remote Telemetry [26]   Diagnosis: Generalized weakness [317293]   Admitting Physician: PORFIRIO VALLEJO [[id number]]   Attending Physician: PORFIRIO VALLEJO [[id number]]                 No follow-up provider specified.       Medication List        New Prescriptions      potassium chloride 20 MEQ  CR tablet  Commonly known as: KLOR-CON M20  Take 1 tablet by mouth Daily.               Where to Get Your Medications        These medications were sent to [facility] [place] - [address] - [phone] [facility] [phone]   [address] [place] [address]      Phone: [phone]   potassium chloride 20 MEQ CR tablet            Joselito Moon MD  05/02/24 7874

## 2024-05-02 NOTE — TELEPHONE ENCOUNTER
----- Message from Grace VELASCO sent at 5/2/2024  7:46 AM CDT -----  Potassium low and kidney function a little worse than baseline.  Does she have potassium at home?  Focus on hydration.    Iron profile looks okay, blood count actually improved.  Continue on iron.  Recommend every other day iron supplementation.

## 2024-05-02 NOTE — TELEPHONE ENCOUNTER
Spoke with the patient's caregiver, Cate, and the patient's sister, Skye Stone, with lab results and instructed that patient needs to increase hydration.  The patient is taking a potassium supplement and they are unsure of the dosage.      The patient is very weak and is not able to stand up and is in bed sleeping.  The patient's left leg is very painful.  Please advise if patient needs to go to [facility] ED and how much potassium she needs to take daily.

## 2024-05-02 NOTE — TELEPHONE ENCOUNTER
Called and spoke to the patient and she seemed alert.  Patient states she has pain in her left knee with some swelling and this is an ongoing issue.  Patient states she is having some weakness due to her knee being painful.  Patient or her caregivers could not find any potassium in her home medications.     Advised patient to take 20 meq potassium for 7 days and this will be been sent to her pharmacy, to drink 64 ounces of water daily to help kidney function and to take iron supplement every other day.  Advised patient if she becomes weak and not able to walk from pain to go to the [facility] ED.  Patient voiced understanding.

## 2024-05-03 PROBLEM — B96.89 BACTEREMIA DUE TO ENTEROBACTER SPECIES: Status: ACTIVE | Noted: 2024-05-03

## 2024-05-03 PROBLEM — R78.81 BACTEREMIA: Status: ACTIVE | Noted: 2024-05-03

## 2024-05-03 PROBLEM — R78.81 BACTEREMIA DUE TO ENTEROBACTER SPECIES: Status: ACTIVE | Noted: 2024-05-03

## 2024-05-03 LAB
ANION GAP SERPL CALCULATED.3IONS-SCNC: 12 MMOL/L (ref 5–15)
BACTERIA BLD CULT: ABNORMAL
BOTTLE TYPE: ABNORMAL
BUN SERPL-MCNC: 26 MG/DL (ref 8–23)
BUN/CREAT SERPL: 16 (ref 7–25)
CALCIUM SPEC-SCNC: 8.8 MG/DL (ref 8.6–10.5)
CHLORIDE SERPL-SCNC: 102 MMOL/L (ref 98–107)
CO2 SERPL-SCNC: 26 MMOL/L (ref 22–29)
CREAT SERPL-MCNC: 1.62 MG/DL (ref 0.57–1)
DEPRECATED RDW RBC AUTO: 58.6 FL (ref 37–54)
EGFRCR SERPLBLD CKD-EPI 2021: 34 ML/MIN/1.73
ERYTHROCYTE [DISTWIDTH] IN BLOOD BY AUTOMATED COUNT: 16.9 % (ref 12.3–15.4)
GLUCOSE SERPL-MCNC: 121 MG/DL (ref 65–99)
HCT VFR BLD AUTO: 31.1 % (ref 34–46.6)
HGB BLD-MCNC: 9.5 G/DL (ref 12–15.9)
MCH RBC QN AUTO: 29.2 PG (ref 26.6–33)
MCHC RBC AUTO-ENTMCNC: 30.5 G/DL (ref 31.5–35.7)
MCV RBC AUTO: 95.7 FL (ref 79–97)
PLATELET # BLD AUTO: 149 10*3/MM3 (ref 140–450)
PMV BLD AUTO: 11.7 FL (ref 6–12)
POTASSIUM SERPL-SCNC: 2.9 MMOL/L (ref 3.5–5.2)
RBC # BLD AUTO: 3.25 10*6/MM3 (ref 3.77–5.28)
SODIUM SERPL-SCNC: 140 MMOL/L (ref 136–145)
WBC NRBC COR # BLD AUTO: 7.86 10*3/MM3 (ref 3.4–10.8)

## 2024-05-03 PROCEDURE — 25010000002 CEFEPIME PER 500 MG: Performed by: NURSE PRACTITIONER

## 2024-05-03 PROCEDURE — 87040 BLOOD CULTURE FOR BACTERIA: CPT | Performed by: INTERNAL MEDICINE

## 2024-05-03 PROCEDURE — 63710000001 PREDNISONE PER 5 MG: Performed by: NURSE PRACTITIONER

## 2024-05-03 PROCEDURE — 97166 OT EVAL MOD COMPLEX 45 MIN: CPT

## 2024-05-03 PROCEDURE — 85027 COMPLETE CBC AUTOMATED: CPT | Performed by: NURSE PRACTITIONER

## 2024-05-03 PROCEDURE — 92610 EVALUATE SWALLOWING FUNCTION: CPT

## 2024-05-03 PROCEDURE — 36415 COLL VENOUS BLD VENIPUNCTURE: CPT | Performed by: NURSE PRACTITIONER

## 2024-05-03 PROCEDURE — 80048 BASIC METABOLIC PNL TOTAL CA: CPT | Performed by: NURSE PRACTITIONER

## 2024-05-03 RX ORDER — CARVEDILOL 6.25 MG/1
12.5 TABLET ORAL 2 TIMES DAILY WITH MEALS
Status: DISCONTINUED | OUTPATIENT
Start: 2024-05-03 | End: 2024-05-09

## 2024-05-03 RX ORDER — POTASSIUM CHLORIDE 750 MG/1
40 CAPSULE, EXTENDED RELEASE ORAL ONCE
Status: COMPLETED | OUTPATIENT
Start: 2024-05-03 | End: 2024-05-03

## 2024-05-03 RX ADMIN — CARVEDILOL 25 MG: 25 TABLET, FILM COATED ORAL at 09:35

## 2024-05-03 RX ADMIN — ASPIRIN 81 MG: 81 TABLET, COATED ORAL at 09:36

## 2024-05-03 RX ADMIN — CEFEPIME 2000 MG: 2 INJECTION, POWDER, FOR SOLUTION INTRAVENOUS at 09:33

## 2024-05-03 RX ADMIN — APIXABAN 5 MG: 5 TABLET, FILM COATED ORAL at 21:26

## 2024-05-03 RX ADMIN — Medication 10 ML: at 09:36

## 2024-05-03 RX ADMIN — LEFLUNOMIDE 20 MG: 20 TABLET ORAL at 21:25

## 2024-05-03 RX ADMIN — PREDNISONE 5 MG: 5 TABLET ORAL at 09:35

## 2024-05-03 RX ADMIN — APIXABAN 5 MG: 5 TABLET, FILM COATED ORAL at 09:35

## 2024-05-03 RX ADMIN — POTASSIUM CHLORIDE 20 MEQ: 1500 TABLET, EXTENDED RELEASE ORAL at 09:36

## 2024-05-03 RX ADMIN — ISOSORBIDE MONONITRATE 60 MG: 60 TABLET, EXTENDED RELEASE ORAL at 09:36

## 2024-05-03 RX ADMIN — FOLIC ACID 1 MG: 1 TABLET ORAL at 09:35

## 2024-05-03 RX ADMIN — Medication 10 ML: at 21:26

## 2024-05-03 RX ADMIN — POTASSIUM CHLORIDE 40 MEQ: 750 CAPSULE, EXTENDED RELEASE ORAL at 09:51

## 2024-05-03 RX ADMIN — CEFEPIME 2000 MG: 2 INJECTION, POWDER, FOR SOLUTION INTRAVENOUS at 21:25

## 2024-05-03 RX ADMIN — DULOXETINE HYDROCHLORIDE 60 MG: 30 CAPSULE, DELAYED RELEASE ORAL at 09:35

## 2024-05-03 RX ADMIN — LOSARTAN POTASSIUM 50 MG: 50 TABLET, FILM COATED ORAL at 09:35

## 2024-05-03 NOTE — H&P
[facility] Medicine Services  HISTORY AND PHYSICAL    Date of Admission: 5/2/2024  Primary Care Physician: Moises Oliveira MD    Subjective   Primary Historian: Patient and medical record    Chief Complaint: Weakness    History of Present Illness  Tawny Shin is a 68 year-old female with a vast medical history to include lower paraplegia secondary to injury, neurogenic bladder previously on continuous catheter then transition to In/Out catheterizations, now patient does void on bedside commode, frequent UTIs, hypertension, chronic kidney disease stage IIIba, coronary artery disease with MI, iliopsoas abscess, discitis, osteomyelitis of lumbar spine, Staph aureus bacteremia, chronic pain syndrome, frequent falls, see below for complete list.  Recently admitted 4/19 - 4/23, 2024 with sepsis due to E. coli, initially treated with IV Rx, discharged on cefdinir 300 mg twice daily.  Per record review patient doxycycline 100 mg twice daily was ordered yesterday (5/1) after call placed to PCP.  Caregiver at bedside states she has not has had 1 dose as prescription was not obtained until today.  She returns today to [facility] ED with complaints of progressively worsening weakness over the past few days.  Sister and caregiver are at bedside stating patient is unable to transfer now even with assistance of 2.  Workup reveals decreased kidney function GFR 28.3 as compared to 48.3 on/19.  Elevated troponin noted, she denies chest pain, we will trend.,  Anemia of chronic disease, iron studies obtained yesterday.  Urinalysis again positive for acute cystitis.  I suspect patient has ongoing urinary retention with resultant UTI.  Record review reveals urine culture obtained on 4/19/2024 was without acute findings, awaiting repeat culture results.  Son arrived and provided substantial history information.  She has no other complaints.    Review of Systems   Otherwise complete ROS reviewed and  "negative except as mentioned in the HPI.    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    Elevated cholesterol     Eustachian tube dysfunction     Heart disease     Herpes simplex     History of transfusion     Hyperlipidemia     Hypertension     Hypothyroidism 12/23/2020    Intrinsic asthma 12/23/2020    Knee dislocation     Labral tear of right hip joint     Laryngitis sicca     Laryngitis, chronic     Left carotid bruit 03/09/2016    MI (myocardial infarction)     Myalgia due to statin 06/25/2019    Open wound of right hip 09/14/2021    Osteomyelitis of right femur 07/06/2021    Otorrhea     Pacemaker 11/17/2016    Primary osteoarthritis of left knee 12/23/2020    Psoriasis vulgaris 12/23/2020    S/P coronary artery stent placement 03/09/2016    Sensorineural hearing loss     Seropositive rheumatoid arthritis of multiple sites 12/27/2019    Overview:  -myochrysine '93-'96 -methotrexate '96--->11/98;r/s  restarted 2/99--> 8/14 (anemia) -sulfasalazine- not effective -penicillamine 6/98-->10/98; no effect -leflunomide 11/98--> - Humira '13-->didn't take - Enbrel 12/14-->3/15- no effect!   Last Assessment & Plan:  - \"aching all over\" because she had to be off her anti-rheumatic drugs for 2 weeks in preparation for her R knee surgery - he    Sick sinus syndrome 12/27/2019    Sjogren's disease     Spondylolisthesis of lumbar region 01/17/2018    Syncope, recurrent 02/08/2021    " Urinary tract infection      Past Surgical History:  Past Surgical History:   Procedure Laterality Date    A-V CARDIAC PACEMAKER INSERTION  2016    ATRIAL CARDIAC PACEMAKER INSERTION      CARDIAC CATHETERIZATION      CATARACT EXTRACTION      CERVICAL CORPECTOMY N/A 3/3/2021    Procedure: CERVICAL 6 CORPECTOMY WITH TITANIUM CAGE WITH NEURO MONITORING;  Surgeon: Bandar Shea MD;  Location:  [facility] OR;  Service: Neurosurgery;  Laterality: N/A;    COLONOSCOPY  11/08/2011    One fold in the ascending colon which showed ulcer otherwise normal exam    COLONOSCOPY  11/12/2004    Normal exam repeat in five years    CORONARY ANGIOPLASTY WITH STENT PLACEMENT      X 2; 2013 & 2014    ENDOSCOPY  07/10/2014    Normal exam    FLAP LEG Right 9/14/2021    Procedure: RIGHT GLUTEAL FASCIOCUTANEOUS ADVANCEMENT FLAP AND RIGHT TENSOR FASCIAL JESSICA FLAP;  Surgeon: Amadeo Turner MD;  Location:  [facility];  Service: Plastics;  Laterality: Right;    HIP ABDUCTION TENOTOMY BILATERAL Right 1/14/2021    Procedure: RIGHT HIP GLUTEUS MEDLUS / MINIMUS REPAIR, POSSIBLE ACHILLES ALLOGRAFT;  Surgeon: Nino Carlson MD;  Location:  [facility];  Service: Orthopedics;  Laterality: Right;    INCISION AND DRAINAGE ABSCESS Right 6/4/2022    Procedure: INCISION AND DRAINAGE ABSCESS right hip;  Surgeon: Magda Salcido MD;  Location:  [facility];  Service: General;  Laterality: Right;    INCISION AND DRAINAGE ABSCESS Right 6/10/2022    Procedure: RIGHT HIP INCISION AND DRAINAGE. MD NEEDS 3L VANC IRRIGATION, CURRETTES, DAICANS, KERLEX ROLLS;  Surgeon: Amadeo Turner MD;  Location:  [facility];  Service: Plastics;  Laterality: Right;    INCISION AND DRAINAGE HIP Right 2/9/2021    Procedure: HIP INCISION AND DRAINAGE;  Surgeon: Nino Carlson MD;  Location:  [facility];  Service: Orthopedics;  Laterality: Right;    INCISION AND DRAINAGE LEG Right 10/24/2021    Procedure: INCISION AND DRAINAGE LOWER EXTREMITY;  Surgeon: Amadeo Turner MD;  Location:  [facility];  Service: Plastics;  Laterality: Right;    INCISION AND DRAINAGE OF WOUND Right 7/8/2021    Procedure: INCISION AND DRAINAGE WOUND RIGHT HIP;  Surgeon: James Huntley MD;  Location:  [facility];  Service: Orthopedics;  Laterality: Right;    JOINT REPLACEMENT      KYPHOPLASTY WITH BIOPSY Bilateral 10/26/2021    Procedure: THOARCIC 12 KYPHOPLASTY WITH BIOPSY;  Surgeon: Bandar Shea MD;  Location:  [facility];  Service: Neurosurgery;  Laterality: Bilateral;    LEG DEBRIDEMENT Right 9/14/2021    Procedure: DEBRIDEMENT OF RIGHT HIP WOUND, RIGHT GLUTEAL FASCIOCUTANEOUS ADVANCEMENT FLAP AND RIGHT TENSOR FASCIAL JESSICA FLAP;  Surgeon: Amadeo Turner MD;  Location:  [facility];  Service: Plastics;  Laterality: Right;    LUMBAR DISCECTOMY Right 3/23/2021    Procedure: LUMBAR DISCECTOMY MICRO, Lumbar 1/2 right;  Surgeon: Bandar Shea MD;  Location:  [facility];  Service: Neurosurgery;  Laterality: Right;    LUMBAR FUSION N/A 1/19/2018    Procedure: L3-4,L4-5 DECOMPRESSION, POSTERIOR SPINAL FUSION WITH INSTRUMENTATION;  Surgeon: Fortino Oropeza MD;  Location:  [facility];  Service:     LUMBAR LAMINECTOMY WITH FUSION Left 1/17/2018    Procedure: LEFT L3-4 L4-5 LATERAL LUMBAR INTERBODY FUSION;  Surgeon: Fortino Oropeza MD;  Location:  [facility];  Service:     MASS EXCISION Right 4/23/2024    Procedure: RIGHT BUTTOCK MASS EXCISION;  Surgeon: Moises Keyes MD;  Location:  [facility];  Service: General;  Laterality: Right;    MYRINGOTOMY W/ TUBES  09/04/2014    TUBES NO LONGER IN PLACE    OTHER SURGICAL HISTORY      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 so hardware was removed and spacers were placed    REPLACEMENT TOTAL KNEE Right      Social History:  reports that she has never smoked. She has never been exposed to tobacco smoke. She has never used smokeless tobacco. She reports that she does not drink alcohol and does not use drugs.    Family History: family history includes Cancer in her mother;  Heart disease in her father.       Allergies:  Allergies   Allergen Reactions    Atorvastatin Other (See Comments)     LEG CRAMPS      Amoxicillin Rash    Escitalopram Rash    Nabumetone Rash    Niacin Er Rash    Penicillin G Rash    Penicillins Rash    Simvastatin Rash       Medications:  Prior to Admission medications    Medication Sig Start Date End Date Taking? Authorizing Provider   acetaminophen (TYLENOL) 325 MG tablet Take 2 tablets by mouth Every 6 (Six) Hours As Needed for Mild Pain (mild pain). Indications: Fever, Pain    Leila Daly MD   apixaban (Eliquis) 5 MG tablet tablet Take 1 tablet by mouth 2 (Two) Times a Day. 4/23/24   Nayan Hale DO   aspirin 81 MG EC tablet Take 1 tablet by mouth Daily. Indications: Disease involving Lipid Deposits in the Arteries    Leila Daly MD   carvedilol (COREG) 25 MG tablet Take 1 tablet by mouth 2 (Two) Times a Day With Meals.    Leila Daly MD   Diclofenac Sodium (VOLTAREN) 1 % gel gel Apply 4 g topically to the appropriate area as directed 4 (Four) Times a Day As Needed (Mild pain). Indications: Joint Damage causing Pain and Loss of Function    Leila Daly MD   doxycycline (VIBRAMYCIN) 100 MG capsule Take 1 capsule by mouth 2 (Two) Times a Day. 5/1/24   Moises Oliveira MD   DULoxetine (CYMBALTA) 60 MG capsule Take 1 capsule by mouth Daily.    Leila Daly MD   ferrous sulfate 324 (65 Fe) MG tablet delayed-release EC tablet Take 1 tablet by mouth Daily With Breakfast.    Leila Daly MD   folic acid (FOLVITE) 1 MG tablet Take 1 tablet by mouth Daily.    Leila Daly MD   furosemide (LASIX) 40 MG tablet Take 1 tablet by mouth Daily.    Leila Daly MD   isosorbide mononitrate (IMDUR) 60 MG 24 hr tablet Take 1 tablet by mouth Daily.    Leila Daly MD   leflunomide (ARAVA) 20 MG tablet Take 1 tablet by mouth every night at bedtime. Indications: Rheumatoid Arthritis 2/6/23    "Moises Oliveira MD   lidocaine (Lidoderm) 5 % Place 1 patch on the skin as directed by provider Daily As Needed for Mild Pain. Remove & Discard patch within 12 hours or as directed by MD 4/9/24   Niki Palma APRN   losartan (COZAAR) 50 MG tablet Take 1 tablet by mouth Daily.    Leila Daly MD   magnesium hydroxide (MILK OF MAGNESIA) 400 MG/5ML suspension Take 30 mL by mouth Daily As Needed for Constipation. Indications: Constipation    Leila Daly MD   multivitamin with minerals tablet tablet Take 1 tablet by mouth Daily. 3/6/21   Taiwo Prado APRN   nitroglycerin (NITROSTAT) 0.4 MG SL tablet Place 1 tablet under the tongue Every 5 (Five) Minutes As Needed for Chest Pain. Take no more than 3 doses in 15 minutes.    Leila Daly MD   ondansetron (Zofran) 4 MG tablet Take 1 tablet by mouth Every 8 (Eight) Hours As Needed for Nausea or Vomiting. 1/22/24   Moises Oliveira MD   potassium chloride (KLOR-CON M20) 20 MEQ CR tablet Take 1 tablet by mouth Daily. 5/2/24   Moises Oliveira MD   predniSONE (DELTASONE) 5 MG tablet Take 1 tablet by mouth Daily.    Leila Daly MD   valACYclovir (VALTREX) 500 MG tablet Take 1 tablet by mouth Daily As Needed.    ProviderLeila MD     I have utilized all available immediate resources to obtain, update, or review the patient's current medications (including all prescriptions, over-the-counter products, herbals, cannabis/cannabidiol products, and vitamin/mineral/dietary (nutritional) supplements).    Objective     Vital Signs: /83   Pulse 84   Temp 98 °F (36.7 °C)   Resp 18   Ht 167.6 cm (66\")   Wt 93.9 kg (207 lb)   LMP  (LMP Unknown)   SpO2 94%   BMI 33.41 kg/m²   Physical Exam  Vitals reviewed.   Constitutional:       Appearance: She is obese. She is ill-appearing.   HENT:      Head: Normocephalic and atraumatic.      Mouth/Throat:      Mouth: Mucous membranes are moist.      Pharynx: Oropharynx is " clear.   Eyes:      Extraocular Movements: Extraocular movements intact.      Conjunctiva/sclera: Conjunctivae normal.   Cardiovascular:      Rate and Rhythm: Normal rate and regular rhythm.   Pulmonary:      Effort: Pulmonary effort is normal.      Breath sounds: Normal breath sounds.      Comments: Breath sounds clear anteriorly  Abdominal:      General: Abdomen is protuberant.      Palpations: Abdomen is soft.   Musculoskeletal:      Cervical back: Normal range of motion and neck supple.      Right lower leg: Edema present.      Left lower leg: Edema present.      Comments: Paraplegia   Skin:     General: Skin is warm and dry.      Coloration: Skin is pale.   Neurological:      General: No focal deficit present.      Mental Status: She is alert and oriented to person, place, and time.      Comments: Paraplegia   Psychiatric:         Mood and Affect: Mood normal.         Behavior: Behavior normal.        Results Reviewed:  Lab Results (last 24 hours)       Procedure Component Value Units Date/Time    Urinalysis With Culture If Indicated - Straight Cath [[id number]]  (Abnormal) Collected: 05/02/24 1956    Specimen: Urine from Straight Cath Updated: 05/02/24 2005     Color, UA Yellow     Appearance, UA Turbid     pH, UA 6.0     Specific Gravity, UA 1.025     Glucose, UA Negative     Ketones, UA Negative     Bilirubin, UA Negative     Blood, UA Large (3+)     Protein, UA >=300 mg/dL (3+)     Leuk Esterase, UA Large (3+)     Nitrite, UA Positive     Urobilinogen, UA 0.2 E.U./dL    Urinalysis, Microscopic Only - Straight Cath [[id number]]  (Abnormal) Collected: 05/02/24 1956    Specimen: Urine from Straight Cath Updated: 05/02/24 2005     RBC, UA 21-50 /HPF      WBC, UA Too Numerous to Count /HPF      Bacteria, UA 3+ /HPF      Squamous Epithelial Cells, UA None Seen /HPF      Methodology Manual Light Microscopy    Urine Culture - Urine, Straight Cath [[id number]] Collected: 05/02/24 1956    Specimen: Urine from Straight  Cath Updated: 05/02/24 2005    High Sensitivity Troponin T [[id number]]  (Abnormal) Collected: 05/02/24 1905    Specimen: Blood Updated: 05/02/24 1943     HS Troponin T 56 ng/L     Comprehensive Metabolic Panel [[id number]]  (Abnormal) Collected: 05/02/24 1905    Specimen: Blood Updated: 05/02/24 1938     Glucose 110 mg/dL      BUN 29 mg/dL      Creatinine 1.89 mg/dL      Sodium 139 mmol/L      Potassium 3.5 mmol/L      Comment: Slight hemolysis detected by analyzer. Result may be falsely elevated.        Chloride 99 mmol/L      CO2 29.0 mmol/L      Calcium 9.3 mg/dL      Total Protein 6.9 g/dL      Albumin 3.3 g/dL      ALT (SGPT) 24 U/L      AST (SGOT) 26 U/L      Comment: Slight hemolysis detected by analyzer. Result may be falsely elevated.        Alkaline Phosphatase 68 U/L      Total Bilirubin 0.4 mg/dL      Globulin 3.6 gm/dL      A/G Ratio 0.9 g/dL      BUN/Creatinine Ratio 15.3     Anion Gap 11.0 mmol/L      eGFR 28.3 mL/min/1.73     Lactic Acid, Plasma [[id number]]  (Normal) Collected: 05/02/24 1905    Specimen: Blood Updated: 05/02/24 1933     Lactate 0.9 mmol/L     Magnesium [[id number]]  (Normal) Collected: 05/02/24 1905    Specimen: Blood Updated: 05/02/24 1933     Magnesium 1.8 mg/dL     BNP [[id number]]  (Abnormal) Collected: 05/02/24 1905    Specimen: Blood Updated: 05/02/24 1932     proBNP 1,900.0 pg/mL     D-dimer, Quantitative [[id number]]  (Abnormal) Collected: 05/02/24 1905    Specimen: Blood Updated: 05/02/24 1929     D-Dimer, Quantitative 1.25 MCGFEU/mL     Protime-INR [[id number]]  (Abnormal) Collected: 05/02/24 1905    Specimen: Blood Updated: 05/02/24 1929     Protime 19.3 Seconds      INR 1.56    aPTT [[id number]]  (Abnormal) Collected: 05/02/24 1905    Specimen: Blood Updated: 05/02/24 1929     PTT 68.0 seconds     CBC Auto Differential [[id number]]  (Abnormal) Collected: 05/02/24 1905    Specimen: Blood Updated: 05/02/24 1923     WBC 8.03 10*3/mm3      RBC 3.28 10*6/mm3      Hemoglobin 9.7 g/dL       Hematocrit 31.6 %      MCV 96.3 fL      MCH 29.6 pg      MCHC 30.7 g/dL      RDW 17.0 %      RDW-SD 58.6 fl      MPV 11.6 fL      Platelets 168 10*3/mm3      Neutrophil % 57.3 %      Lymphocyte % 26.5 %      Monocyte % 13.6 %      Eosinophil % 1.0 %      Basophil % 0.5 %      Immature Grans % 1.1 %      Neutrophils, Absolute 4.60 10*3/mm3      Lymphocytes, Absolute 2.13 10*3/mm3      Monocytes, Absolute 1.09 10*3/mm3      Eosinophils, Absolute 0.08 10*3/mm3      Basophils, Absolute 0.04 10*3/mm3      Immature Grans, Absolute 0.09 10*3/mm3      nRBC 0.4 /100 WBC     COVID-19, FLU A/B, RSV PCR 1 HR TAT - Swab, Nasopharynx [[id number]]  (Normal) Collected: 05/02/24 1826    Specimen: Swab from Nasopharynx Updated: 05/02/24 1910     COVID19 Not Detected     Influenza A PCR Not Detected     Influenza B PCR Not Detected     RSV, PCR Not Detected       Imaging Results (Last 24 Hours)       Procedure Component Value Units Date/Time    XR Chest 1 View [[id number]] Collected: 05/02/24 1806     Updated: 05/02/24 1810    Narrative:      EXAM: XR CHEST 1 VW- 5/2/2024 4:55 PM     HISTORY: Weakness       COMPARISON: 4/19/2024.     TECHNIQUE: Single frontal radiograph of the chest was obtained.     FINDINGS:     Support Devices: Cardiac pacemaker device with leads overlying the right  atrium and right ventricle.     Cardiac and Mediastinal Silhouettes: Normal.     Lungs/Pleura: No focal consolidation. No sizable pleural effusion. No  visible pneumothorax.     Osseous structures: No acute osseous finding.     Other: None.       Impression:         No acute cardiopulmonary abnormality.           This report was signed and finalized on 5/2/2024 6:07 PM by Marco Tovar.             1/9/2024   Urine Culture Final report Abnormal    Result 1 Comment Abnormal     Comment: Proteus mirabilis/penneri  Cefazolin with an LICHA <=16 predicts susceptibility to the oral agents  cefaclor, cefdinir, cefpodoxime, cefprozil, cefuroxime,  cephalexin,  and loracarbef when used for therapy of uncomplicated urinary tract  infections due to E. coli, Klebsiella pneumoniae, and Proteus  mirabilis.  Greater than 100,000 colony forming units per mL   Susceptibility Testing Comment    Comment:       ** S = Susceptible; I = Intermediate; R = Resistant **                     P = Positive; N = Negative              MICS are expressed in micrograms per mL     Antibiotic                 RSLT#1    RSLT#2    RSLT#3    RSLT#4  Amoxicillin/Clavulanic Acid    S  Ampicillin                     S  Cefazolin                      S  Cefepime                       S  Ceftriaxone                    S  Cefuroxime                     S  Ciprofloxacin                  S  Ertapenem                      S  Gentamicin                     S  Levofloxacin                   S  Meropenem                      S  Nitrofurantoin                 R  Piperacillin/Tazobactam        S  Tetracycline                   R  Tobramycin                     S  Trimethoprim/Sulfa             S     2/9/2024 stress echocardiogram  Interpretation Summary     Abnormal stress echo with echocardiographic evidence for myocardial ischemia located in the lateral wall consistent with a high risk study for myocardial ischemia.    The following left ventricular wall segments are hypokinetic: mid anterolateral, apical lateral and mid inferolateral.    Left ventricular systolic function is normal at rest.       Assessment / Plan   Assessment:   Active Hospital Problems    Diagnosis     **Generalized weakness     Paralysis     History of urinary retention     Stage 3b chronic kidney disease     UTI (urinary tract infection)     Anemia of chronic disease     Chronic anticoagulation        Treatment Plan  1.  The patient will be admitted to Dr. Camacho's service here at [facility].   2.  Continue cefepime as started in the emergency department  3.  Labs in a.m., await urine culture results  4.  Consult  , PT and OT  5.  Home medications reviewed and restarted as appropriate, will hold Lasix for now, reevaluate kidney function in a.m., restart diuretic if back to baseline  6.  DVT prophylaxis with ongoing Eliquis use  7.  Incentive spirometry, supplemental oxygen as needed  8.  Labs in a.m.  9.  Turn every 2 hours, daily weights, cardiac monitoring    Medical Decision Making  Number and Complexity of problems: 7  Differential Diagnosis: None    Conditions and Status        Condition is unchanged.     [facility] Data  External documents reviewed: No  Cardiac tracing (EKG, telemetry) interpretation: Reviewed  Radiology interpretation: Reviewed  Labs reviewed: Yes  Any tests that were considered but not ordered: No     Decision rules/scores evaluated (example [id number], Wells, etc): No     Discussed with: Patient, family at bedside and Dr. Camacho     Care Planning  Shared decision making: Patient, family at bedside and Dr. Camacho  Code status and discussions: Full    Disposition  Social Determinants of Health that impact treatment or disposition: Lives at home however does have 24/7 caregivers  Estimated length of stay is 1-2 days.     I confirmed that the patient's advanced care plan is present, code status is documented, and a surrogate decision maker is listed in the patient's medical record.     The patient's surrogate decision maker is family.     The patient was seen and examined by me on 5/2/2024 at 8:48 PM.    Electronically signed by DANIELA Miranda, 05/02/24, 21:58 CDT.

## 2024-05-03 NOTE — PLAN OF CARE
Goal Outcome Evaluation:  Plan of Care Reviewed With: patient        Progress: no change  Outcome Evaluation: see note                 SLP Swallowing Diagnosis: R/O pharyngeal dysphagia (05/03/24 8493)

## 2024-05-03 NOTE — THERAPY EVALUATION
Acute Care - Speech Language Pathology   Swallow Initial Evaluation [facility]     Patient Name: Tawny Shin  : 1955  MRN: [id number]  Today's Date: 5/3/2024               Admit Date: 2024    SPEECH-LANGUAGE PATHOLOGY EVALUATION - SWALLOW  Subjective: The patient was seen on this date for a Clinical Swallow evaluation.  Patient was alert and cooperative. Upon arrival, pt was sitting upright in bed consuming breakfast.   Significant history: Presented to ED d/t increasing weakness. Hx of arthritis, asthma, cancer, COPD, CAD, elevated cholesterol, heart disease, HLD, HTN, hypothyroidism, pacemaker, lower paraplegia. Dx weakness and UTI. ST consulted d/t pt occasionally choking. Pt stated that this has been occurring for approximately 2-3 months.     Objective: Textures given included ice and regular consistency.  Assessment: Difficulties were noted with regular consistency.  Observations: Pt demonstrated increased bolus prep time and poor rotary chew with consumption of regular solids. She did stated that she felt she had to chew for a significant amount of time. SLP observed oral cavity following completion of initial swallow and residue remained in L side of oral cavity. Pt cued to clear with thin liquid wash. No overt s/s of aspiration observed at bedside. Vocal quality remained clear.     SLP Findings:  Patient presents with moderate oral dysphagia, without esophageal component.   Recommendations: Diet Textures: thin liquid, soft to chew solids with chopped meat. Diet message placed for extra gravy and sauces to be included on all meal trays. Medications should be taken whole with puree as pt reports difficulty with swallowing pills. May have water and ice between meals after oral care, under staff or family supervision and with the recommended strategies for safe swallowing.   Recommended Strategies: Upright for PO, small bites and sips, may use straw, and alternate liquids and solids. Oral care  before breakfast, after all meals and PRN. Pt provided education on strategies listed. She expressed understanding and was agreeable.   Other Recommended Evaluations: VFSS if concerns persist.   Dysphagia therapy is recommended.     Jennifer Lozoya, MS CCC-SLP 5/3/2024 10:48 CDT          Visit Dx:     ICD-10-CM ICD-9-CM   1. Weakness  R53.1 780.79   2. Urinary tract infection without hematuria, site unspecified  N39.0 599.0   3. Dysphagia, unspecified type [R13.10]  R13.10 787.20     Patient Active Problem List   Diagnosis    T12 compression fracture, initial encounter    Chronic embolism and thrombosis of unspecified deep veins of left lower extremity    Chronic anticoagulation    Iron deficiency anemia    Osteoporosis    E coli bacteremia    Epidural hematoma    Pleural effusion, left    Functional neurological symptom disorder with weakness or paralysis    Overweight (BMI 25.0-29.9)    Rheumatoid arthritis involving multiple sites    (HFpEF) heart failure with preserved ejection fraction    Venous insufficiency    Coronary artery disease involving native coronary artery of native heart without angina pectoris    Sick sinus syndrome    Essential hypertension    Pressure injury of skin of heel    Chronic pain    Anemia of chronic disease    Cholelithiasis    Pressure injury of skin of buttock    Near functional paraplegia    Skin cancer    Squamous cell carcinoma of back    Hematoma    Right-sided chest wall pain    Hyperlipidemia LDL goal <70    Malodorous urine    UTI (urinary tract infection)    Stage 3b chronic kidney disease    Cyst of buttocks    Sepsis due to Escherichia coli without acute organ dysfunction    Generalized weakness    Paralysis    History of urinary retention     Past Medical History:   Diagnosis Date    Age-related osteoporosis with current pathological fracture 05/27/2020    Arthritis     Asthma     Bilateral bunions 12/23/2020    Cancer     Cardiac pacemaker syndrome 12/23/2020    Overview:  -  "heart block - implanted 11/16    Charcot's joint of foot, left 12/23/2020    Chronic deep vein thrombosis (DVT) of right lower extremity 06/23/2021    Chronic pain syndrome 06/22/2021    Chronic sinusitis     COPD (chronic obstructive pulmonary disease)     Coronary artery disease     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    Elevated cholesterol     Eustachian tube dysfunction     Heart disease     Herpes simplex     History of transfusion     Hyperlipidemia     Hypertension     Hypothyroidism 12/23/2020    Intrinsic asthma 12/23/2020    Knee dislocation     Labral tear of right hip joint     Laryngitis sicca     Laryngitis, chronic     Left carotid bruit 03/09/2016    MI (myocardial infarction)     Myalgia due to statin 06/25/2019    Open wound of right hip 09/14/2021    Osteomyelitis of right femur 07/06/2021    Otorrhea     Pacemaker 11/17/2016    Primary osteoarthritis of left knee 12/23/2020    Psoriasis vulgaris 12/23/2020    S/P coronary artery stent placement 03/09/2016    Sensorineural hearing loss     Seropositive rheumatoid arthritis of multiple sites 12/27/2019    Overview:  -myochrysine '93-'96 -methotrexate '96--->11/98;r/s  restarted 2/99--> 8/14 (anemia) -sulfasalazine- not effective -penicillamine 6/98-->10/98; no effect -leflunomide 11/98--> - Humira '13-->didn't take - Enbrel 12/14-->3/15- no effect!   Last Assessment & Plan:  - \"aching all over\" because she had to be off her anti-rheumatic drugs for 2 weeks in preparation for her R knee surgery - he    Sick sinus syndrome 12/27/2019    Sjogren's disease     Spondylolisthesis of lumbar region 01/17/2018    Syncope, recurrent 02/08/2021    Urinary tract infection      Past Surgical History:   Procedure Laterality Date    A-V CARDIAC PACEMAKER INSERTION  2016    ATRIAL CARDIAC PACEMAKER INSERTION      CARDIAC CATHETERIZATION      CATARACT EXTRACTION      CERVICAL CORPECTOMY N/A 3/3/2021    Procedure: CERVICAL 6 CORPECTOMY WITH TITANIUM CAGE " WITH NEURO MONITORING;  Surgeon: Bandar Shea MD;  Location:  [facility];  Service: Neurosurgery;  Laterality: N/A;    COLONOSCOPY  11/08/2011    One fold in the ascending colon which showed ulcer otherwise normal exam    COLONOSCOPY  11/12/2004    Normal exam repeat in five years    CORONARY ANGIOPLASTY WITH STENT PLACEMENT      X 2; 2013 & 2014    ENDOSCOPY  07/10/2014    Normal exam    FLAP LEG Right 9/14/2021    Procedure: RIGHT GLUTEAL FASCIOCUTANEOUS ADVANCEMENT FLAP AND RIGHT TENSOR FASCIAL JESSICA FLAP;  Surgeon: Amadeo Turner MD;  Location:  [facility];  Service: Plastics;  Laterality: Right;    HIP ABDUCTION TENOTOMY BILATERAL Right 1/14/2021    Procedure: RIGHT HIP GLUTEUS MEDLUS / MINIMUS REPAIR, POSSIBLE ACHILLES ALLOGRAFT;  Surgeon: Nino Carlson MD;  Location:  [facility];  Service: Orthopedics;  Laterality: Right;    INCISION AND DRAINAGE ABSCESS Right 6/4/2022    Procedure: INCISION AND DRAINAGE ABSCESS right hip;  Surgeon: Magda Salcido MD;  Location:  [facility];  Service: General;  Laterality: Right;    INCISION AND DRAINAGE ABSCESS Right 6/10/2022    Procedure: RIGHT HIP INCISION AND DRAINAGE. MD NEEDS 3L VANC IRRIGATION, CURRETTES, DAICANS, KERLEX ROLLS;  Surgeon: Amadeo Turner MD;  Location:  [facility];  Service: Plastics;  Laterality: Right;    INCISION AND DRAINAGE HIP Right 2/9/2021    Procedure: HIP INCISION AND DRAINAGE;  Surgeon: Nino Carlson MD;  Location:  [facility];  Service: Orthopedics;  Laterality: Right;    INCISION AND DRAINAGE LEG Right 10/24/2021    Procedure: INCISION AND DRAINAGE LOWER EXTREMITY;  Surgeon: Amadeo Turner MD;  Location:  [facility];  Service: Plastics;  Laterality: Right;    INCISION AND DRAINAGE OF WOUND Right 7/8/2021    Procedure: INCISION AND DRAINAGE WOUND RIGHT HIP;  Surgeon: James Huntley MD;  Location:  [facility];  Service: Orthopedics;  Laterality: Right;    JOINT REPLACEMENT      KYPHOPLASTY WITH BIOPSY Bilateral 10/26/2021     Procedure: THOARCIC 12 KYPHOPLASTY WITH BIOPSY;  Surgeon: Bandar Shea MD;  Location:  [facility];  Service: Neurosurgery;  Laterality: Bilateral;    LEG DEBRIDEMENT Right 9/14/2021    Procedure: DEBRIDEMENT OF RIGHT HIP WOUND, RIGHT GLUTEAL FASCIOCUTANEOUS ADVANCEMENT FLAP AND RIGHT TENSOR FASCIAL JESSICA FLAP;  Surgeon: Amadeo Turner MD;  Location:  [facility];  Service: Plastics;  Laterality: Right;    LUMBAR DISCECTOMY Right 3/23/2021    Procedure: LUMBAR DISCECTOMY MICRO, Lumbar 1/2 right;  Surgeon: Bandar Shea MD;  Location:  [facility];  Service: Neurosurgery;  Laterality: Right;    LUMBAR FUSION N/A 1/19/2018    Procedure: L3-4,L4-5 DECOMPRESSION, POSTERIOR SPINAL FUSION WITH INSTRUMENTATION;  Surgeon: Fortino Oropeza MD;  Location:  [facility];  Service:     LUMBAR LAMINECTOMY WITH FUSION Left 1/17/2018    Procedure: LEFT L3-4 L4-5 LATERAL LUMBAR INTERBODY FUSION;  Surgeon: Fortino Oropeza MD;  Location:  [facility];  Service:     MASS EXCISION Right 4/23/2024    Procedure: RIGHT BUTTOCK MASS EXCISION;  Surgeon: Moises Keyes MD;  Location:  [facility];  Service: General;  Laterality: Right;    MYRINGOTOMY W/ TUBES  09/04/2014    TUBES NO LONGER IN PLACE    OTHER SURGICAL HISTORY      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 so hardware was removed and spacers were placed    REPLACEMENT TOTAL KNEE Right        SLP Recommendation and Plan  SLP Swallowing Diagnosis: R/O pharyngeal dysphagia (05/03/24 0854)  SLP Diet Recommendation: soft to chew textures, chopped, thin liquids (05/03/24 0854)  Recommended Precautions and Strategies: upright posture during/after eating, small bites of food and sips of liquid, alternate between small bites of food and sips of liquid, general aspiration precautions (05/03/24 0854)  SLP Rec. for Method of Medication Administration: meds whole, with puree (05/03/24 0854)     Monitor for Signs of Aspiration: yes, notify SLP if any concerns (05/03/24 0854)  Recommended  Diagnostics: VFSS (MBS) (if concerns persist) (05/03/24 0854)  Swallow Criteria for Skilled Therapeutic Interventions Met: demonstrates skilled criteria (05/03/24 0854)     Rehab Potential/Prognosis, Swallowing: good, to achieve stated therapy goals (05/03/24 0854)  Therapy Frequency (Swallow): at least, 2 days per week (05/03/24 0854)  Predicted Duration Therapy Intervention (Days): until discharge (05/03/24 0854)  Oral Care Recommendations: Oral Care BID/PRN (05/03/24 0854)                                        Plan of Care Reviewed With: patient  Progress: no change  Outcome Evaluation: see note      SWALLOW EVALUATION (Last 72 Hours)       SLP Adult Swallow Evaluation       Row Name 05/03/24 0854                   Rehab Evaluation    Document Type evaluation  -JR        Subjective Information no complaints  -JR        Patient Observations alert;cooperative;agree to therapy  -JR        Patient Effort good  -JR        Symptoms Noted During/After Treatment none  -JR           General Information    Patient Profile Reviewed yes  -JR        Pertinent History Of Current Problem --  Presented to ED d/t increasing weakness. Hx of arthritis, asthma, cancer, COPD, CAD, elevated cholesterol, heart disease, HLD, HTN, hypothyroidism, pacemaker, lower paraplegia. Dx weakness and UTI. ST consulted d/t pt occasionally choking.  -JR        Current Method of Nutrition regular textures;thin liquids  -JR        Precautions/Limitations, Vision WFL;for purposes of eval  -JR        Precautions/Limitations, Hearing WFL;for purposes of eval  -JR        Prior Level of Function-Communication unknown  -JR        Prior Level of Function-Swallowing no diet consistency restrictions  -JR        Plans/Goals Discussed with patient  -JR        Barriers to Rehab medically complex  -JR        Patient's Goals for Discharge eat/drink without coughing/choking  -JR        Family Goals for Discharge family did not state  -JR           Pain     Additional Documentation Pain Scale: FACES Pre/Post-Treatment (Group)  -JR           Pain Scale: FACES Pre/Post-Treatment    Pain: FACES Scale, Pretreatment 0-->no hurt  -JR        Posttreatment Pain Rating 0-->no hurt  -JR           Oral Motor Structure and Function    Dentition Assessment natural, present and adequate  -JR        Secretion Management WNL/WFL  -JR        Mucosal Quality moist, healthy  -JR        Gag Response WFL  -JR        Volitional Swallow WFL  -JR        Volitional Cough WFL  -JR           Oral Musculature and Cranial Nerve Assessment    Oral Motor General Assessment generalized oral motor weakness  -JR           General Eating/Swallowing Observations    Respiratory Support Currently in Use room air  -JR        Eating/Swallowing Skills self-fed;fed by SLP  -JR        Positioning During Eating upright in bed  -JR        Utensils Used spoon;straw  -JR        Consistencies Trialed soft to chew textures;regular textures;thin liquids  -JR           Clinical Swallow Eval    Oral Prep Phase impaired  -JR        Oral Transit impaired  -JR        Oral Residue impaired  -JR        Pharyngeal Phase no overt signs/symptoms of pharyngeal impairment  -JR        Esophageal Phase unremarkable  -JR           Oral Prep Concerns    Oral Prep Concerns prolonged mastication;poor rotary chew;increased prep time  -JR        Prolonged Mastication regular consistencies  -JR        Poor Rotary Chew regular consistencies  -JR        Increased Prep Time regular consistencies  -JR           Oral Transit Concerns    Oral Transit Concerns delayed initiation of bolus transit  -JR        Delayed Intiation of Bolus Transit regular consistencies  -JR           Oral Residue Concerns    Oral Residue Concerns lateral sulcus residue, left  -JR        Lateral Sulcus Residue, Left regular consistencies  -JR           SLP Evaluation Clinical Impression    SLP Swallowing Diagnosis R/O pharyngeal dysphagia  -JR        Functional Impact  risk of aspiration/pneumonia  -JR        Rehab Potential/Prognosis, Swallowing good, to achieve stated therapy goals  -JR        Swallow Criteria for Skilled Therapeutic Interventions Met demonstrates skilled criteria  -JR           Recommendations    Therapy Frequency (Swallow) at least;2 days per week  -JR        Predicted Duration Therapy Intervention (Days) until discharge  -JR        SLP Diet Recommendation soft to chew textures;chopped;thin liquids  -JR        Recommended Diagnostics VFSS (MBS)  if concerns persist  -JR        Recommended Precautions and Strategies upright posture during/after eating;small bites of food and sips of liquid;alternate between small bites of food and sips of liquid;general aspiration precautions  -JR        Oral Care Recommendations Oral Care BID/PRN  -JR        SLP Rec. for Method of Medication Administration meds whole;with puree  -JR        Monitor for Signs of Aspiration yes;notify SLP if any concerns  -JR           Swallow Goals (SLP)    Swallow LTGs Patient will demonstrate functional swallow for  -JR        Swallow STGs diet tolerance goal selection (SLP)  -JR        Diet Tolerance Goal Selection (SLP) Patient will tolerate trials of  -JR           (LTG) Patient will demonstrate functional swallow for    Diet Texture (Demonstrate functional swallow) soft to chew (chopped) textures  -JR        Liquid viscosity (Demonstrate functional swallow) thin liquids  -JR        [place] (Demonstrate functional swallow) independently (over 90% accuracy)  -JR        Time Frame (Demonstrate functional swallow) by discharge  -JR        Progress/Outcomes (Demonstrate functional swallow) new goal  -JR           (STG) Patient will tolerate trials of    Consistencies Trialed (Tolerate trials) soft to chew (chopped) textures;thin liquids  -JR        Desired Outcome (Tolerate trials) without signs/symptoms of aspiration;without signs of distress;with adequate oral prep/transit/clearance  -JR         [place] (Tolerate trials) with minimal cues (75-90% accuracy)  -JR        Progress/Outcomes (Tolerate trials) new goal  -JR                  User Key  (r) = Recorded By, (t) = Taken By, (c) = Cosigned By      Initials Name Effective Dates    Jennifer Abbasi MS CCC-SLP 08/22/23 -                     EDUCATION  The patient has been educated in the following areas:   Dysphagia (Swallowing Impairment).        SLP GOALS       Row Name 05/03/24 0854             (LTG) Patient will demonstrate functional swallow for    Diet Texture (Demonstrate functional swallow) soft to chew (chopped) textures  -JR      Liquid viscosity (Demonstrate functional swallow) thin liquids  -JR      [place] (Demonstrate functional swallow) independently (over 90% accuracy)  -JR      Time Frame (Demonstrate functional swallow) by discharge  -JR      Progress/Outcomes (Demonstrate functional swallow) new goal  -JR         (STG) Patient will tolerate trials of    Consistencies Trialed (Tolerate trials) soft to chew (chopped) textures;thin liquids  -JR      Desired Outcome (Tolerate trials) without signs/symptoms of aspiration;without signs of distress;with adequate oral prep/transit/clearance  -JR      [place] (Tolerate trials) with minimal cues (75-90% accuracy)  -JR      Progress/Outcomes (Tolerate trials) new goal  -                User Key  (r) = Recorded By, (t) = Taken By, (c) = Cosigned By      Initials Name Provider Type    Jennifer Abbasi MS CCC-SLP Speech and Language Pathologist                       Time Calculation:    Time Calculation- SLP       Row Name 05/03/24 1047             Time Calculation- SLP    SLP Start Time 0854  -      SLP Stop Time 0942  -      SLP Time Calculation (min) 48 min  -      SLP Received On 05/03/24  -      SLP Goal Re-Cert Due Date 05/13/24  -         Untimed Charges    [id number] Eval Oral Pharyng Swallow Minutes 48  -JR         Total Minutes    Untimed Charges Total Minutes 48   -JR       Total Minutes 48  -JR                User Key  (r) = Recorded By, (t) = Taken By, (c) = Cosigned By      Initials Name Provider Type    Jennifer Abbasi MS CCC-SLP Speech and Language Pathologist                    Therapy Charges for Today       Code Description Service Date Service Provider Modifiers Qty    [id number]  ST EVAL ORAL PHARYNG SWALLOW 3 5/3/2024 Jennifer Lozoya MS CCC-SLP GN 1                 Jennifer Lozoya MS CCC-GT  5/3/2024

## 2024-05-03 NOTE — PLAN OF CARE
Goal Outcome Evaluation:      Alert and oriented x4. Scarring noted to right hip area from previous cyst removal. Cecy area raw and excoriated. VSS. Safety maintained. IV antibiotics continue.

## 2024-05-03 NOTE — CASE MANAGEMENT/SOCIAL WORK
Continued Stay Note  [facility]     Patient Name: Tawny Shin  MRN: [id number]  Today's Date: 5/3/2024    Admit Date: 5/2/2024        Discharge Plan       Row Name 05/03/24 1605       Plan    Plan Comments Therapy is currently working with pt in room. Pt resides at home and has caregivers that assist. Pt has all DME needed in home. SW will follow up with pt later today or tomorrow regarding dc plans/needs. Noted that pt is currently in observation status so if rehab placement needed, Medicare will not cover.                    Discharge Codes    No documentation.                       OSIEL Pizano

## 2024-05-03 NOTE — PLAN OF CARE
Goal Outcome Evaluation:  Plan of Care Reviewed With: patient, caregiver, son        Progress: no change  Outcome Evaluation: OT cristofer complete.  Pt A&Ox4 c/o generalized weakness & fatigue. Pain noted with bed mobility near recent R hip incision. At baseline, pt lives alone but has 24/7 caregivers who provide min-modA with ADLs & transfers into electric  for mobility. Pt on bedpan upon therapist arrival, able to roll in bed with modA multiple times for john hygiene & changing her brief. Pt very fatigued after bed mob & declining attempts at sup>sit or standing. BUE ROM & MMT performed in supine, demos decreased strength. Pt left in L sidelying with wedges utilized, reports increase in comfort. She would benefit from skilled OT to maximize her safety/I with ADLs & progress her act cara back to her baseline. Will make d/c recommendations after observing OOB activity, discharge dispo pending pt progress with therapy. Pt is agreeable to rehab if needed but does have 24/7 assist & various DME.

## 2024-05-03 NOTE — THERAPY EVALUATION
Acute Care - Occupational Therapy Initial Evaluation  [facility]     Patient Name: Tawny Shin  : 1955  MRN: [id number]  Today's Date: 5/3/2024     Date of Referral to OT: 24       Admit Date: 2024       ICD-10-CM ICD-9-CM   1. Weakness  R53.1 780.79   2. Urinary tract infection without hematuria, site unspecified  N39.0 599.0   3. Dysphagia, unspecified type [R13.10]  R13.10 787.20     Patient Active Problem List   Diagnosis    T12 compression fracture, initial encounter    Chronic embolism and thrombosis of unspecified deep veins of left lower extremity    Chronic anticoagulation    Iron deficiency anemia    Osteoporosis    E coli bacteremia    Epidural hematoma    Pleural effusion, left    Functional neurological symptom disorder with weakness or paralysis    Overweight (BMI 25.0-29.9)    Rheumatoid arthritis involving multiple sites    (HFpEF) heart failure with preserved ejection fraction    Venous insufficiency    Coronary artery disease involving native coronary artery of native heart without angina pectoris    Sick sinus syndrome    Essential hypertension    Pressure injury of skin of heel    Chronic pain    Anemia of chronic disease    Cholelithiasis    Pressure injury of skin of buttock    Near functional paraplegia    Skin cancer    Squamous cell carcinoma of back    Hematoma    Right-sided chest wall pain    Hyperlipidemia LDL goal <70    Malodorous urine    UTI (urinary tract infection)    Stage 3b chronic kidney disease    Cyst of buttocks    Sepsis due to Escherichia coli without acute organ dysfunction    Generalized weakness    Paralysis    History of urinary retention     Past Medical History:   Diagnosis Date    Age-related osteoporosis with current pathological fracture 2020    Arthritis     Asthma     Bilateral bunions 2020    Cancer     Cardiac pacemaker syndrome 2020    Overview:  - heart block - implanted     Charcot's joint of foot, left  "12/23/2020    Chronic deep vein thrombosis (DVT) of right lower extremity 06/23/2021    Chronic pain syndrome 06/22/2021    Chronic sinusitis     COPD (chronic obstructive pulmonary disease)     Coronary artery disease     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    Elevated cholesterol     Eustachian tube dysfunction     Heart disease     Herpes simplex     History of transfusion     Hyperlipidemia     Hypertension     Hypothyroidism 12/23/2020    Intrinsic asthma 12/23/2020    Knee dislocation     Labral tear of right hip joint     Laryngitis sicca     Laryngitis, chronic     Left carotid bruit 03/09/2016    MI (myocardial infarction)     Myalgia due to statin 06/25/2019    Open wound of right hip 09/14/2021    Osteomyelitis of right femur 07/06/2021    Otorrhea     Pacemaker 11/17/2016    Primary osteoarthritis of left knee 12/23/2020    Psoriasis vulgaris 12/23/2020    S/P coronary artery stent placement 03/09/2016    Sensorineural hearing loss     Seropositive rheumatoid arthritis of multiple sites 12/27/2019    Overview:  -myochrysine '93-'96 -methotrexate '96--->11/98;r/s  restarted 2/99--> 8/14 (anemia) -sulfasalazine- not effective -penicillamine 6/98-->10/98; no effect -leflunomide 11/98--> - Humira '13-->didn't take - Enbrel 12/14-->3/15- no effect!   Last Assessment & Plan:  - \"aching all over\" because she had to be off her anti-rheumatic drugs for 2 weeks in preparation for her R knee surgery - he    Sick sinus syndrome 12/27/2019    Sjogren's disease     Spondylolisthesis of lumbar region 01/17/2018    Syncope, recurrent 02/08/2021    Urinary tract infection      Past Surgical History:   Procedure Laterality Date    A-V CARDIAC PACEMAKER INSERTION  2016    ATRIAL CARDIAC PACEMAKER INSERTION      CARDIAC CATHETERIZATION      CATARACT EXTRACTION      CERVICAL CORPECTOMY N/A 3/3/2021    Procedure: CERVICAL 6 CORPECTOMY WITH TITANIUM CAGE WITH NEURO MONITORING;  Surgeon: Bandar Shea MD;  " Location:  [facility];  Service: Neurosurgery;  Laterality: N/A;    COLONOSCOPY  11/08/2011    One fold in the ascending colon which showed ulcer otherwise normal exam    COLONOSCOPY  11/12/2004    Normal exam repeat in five years    CORONARY ANGIOPLASTY WITH STENT PLACEMENT      X 2; 2013 & 2014    ENDOSCOPY  07/10/2014    Normal exam    FLAP LEG Right 9/14/2021    Procedure: RIGHT GLUTEAL FASCIOCUTANEOUS ADVANCEMENT FLAP AND RIGHT TENSOR FASCIAL JESSICA FLAP;  Surgeon: Amadeo Turner MD;  Location:  [facility];  Service: Plastics;  Laterality: Right;    HIP ABDUCTION TENOTOMY BILATERAL Right 1/14/2021    Procedure: RIGHT HIP GLUTEUS MEDLUS / MINIMUS REPAIR, POSSIBLE ACHILLES ALLOGRAFT;  Surgeon: Nino Carlson MD;  Location:  [facility];  Service: Orthopedics;  Laterality: Right;    INCISION AND DRAINAGE ABSCESS Right 6/4/2022    Procedure: INCISION AND DRAINAGE ABSCESS right hip;  Surgeon: Magda Salcido MD;  Location:  [facility];  Service: General;  Laterality: Right;    INCISION AND DRAINAGE ABSCESS Right 6/10/2022    Procedure: RIGHT HIP INCISION AND DRAINAGE. MD NEEDS 3L VANC IRRIGATION, CURRETTES, DAICANS, KERLEX ROLLS;  Surgeon: Amadeo Turner MD;  Location:  [facility];  Service: Plastics;  Laterality: Right;    INCISION AND DRAINAGE HIP Right 2/9/2021    Procedure: HIP INCISION AND DRAINAGE;  Surgeon: Nino Carlson MD;  Location:  [facility];  Service: Orthopedics;  Laterality: Right;    INCISION AND DRAINAGE LEG Right 10/24/2021    Procedure: INCISION AND DRAINAGE LOWER EXTREMITY;  Surgeon: Amadeo Turner MD;  Location:  [facility];  Service: Plastics;  Laterality: Right;    INCISION AND DRAINAGE OF WOUND Right 7/8/2021    Procedure: INCISION AND DRAINAGE WOUND RIGHT HIP;  Surgeon: James Huntley MD;  Location:  [facility];  Service: Orthopedics;  Laterality: Right;    JOINT REPLACEMENT      KYPHOPLASTY WITH BIOPSY Bilateral 10/26/2021    Procedure: THOARCIC 12 KYPHOPLASTY WITH BIOPSY;  Surgeon:  Bandar Shea MD;  Location:  [facility];  Service: Neurosurgery;  Laterality: Bilateral;    LEG DEBRIDEMENT Right 9/14/2021    Procedure: DEBRIDEMENT OF RIGHT HIP WOUND, RIGHT GLUTEAL FASCIOCUTANEOUS ADVANCEMENT FLAP AND RIGHT TENSOR FASCIAL JESSICA FLAP;  Surgeon: Amadeo Turner MD;  Location:  [facility];  Service: Plastics;  Laterality: Right;    LUMBAR DISCECTOMY Right 3/23/2021    Procedure: LUMBAR DISCECTOMY MICRO, Lumbar 1/2 right;  Surgeon: Bandar Shea MD;  Location:  [facility];  Service: Neurosurgery;  Laterality: Right;    LUMBAR FUSION N/A 1/19/2018    Procedure: L3-4,L4-5 DECOMPRESSION, POSTERIOR SPINAL FUSION WITH INSTRUMENTATION;  Surgeon: Fortino Oropeza MD;  Location:  [facility];  Service:     LUMBAR LAMINECTOMY WITH FUSION Left 1/17/2018    Procedure: LEFT L3-4 L4-5 LATERAL LUMBAR INTERBODY FUSION;  Surgeon: Fortino Oropeza MD;  Location:  [facility];  Service:     MASS EXCISION Right 4/23/2024    Procedure: RIGHT BUTTOCK MASS EXCISION;  Surgeon: Moises Keyes MD;  Location:  [facility];  Service: General;  Laterality: Right;    MYRINGOTOMY W/ TUBES  09/04/2014    TUBES NO LONGER IN PLACE    OTHER SURGICAL HISTORY      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 so hardware was removed and spacers were placed    REPLACEMENT TOTAL KNEE Right          OT ASSESSMENT FLOWSHEET (Last 12 Hours)       OT Evaluation and Treatment       Row Name 05/03/24 [facility]                   OT Time and Intention    Subjective Information complains of;weakness  -EC        Document Type evaluation  -EC        Mode of Treatment occupational therapy  -EC           General Information    Patient Profile Reviewed yes  -EC        Prior Level of Function independent:;feeding;grooming;mod assist:;transfer;dressing;bathing;all household mobility;dependent:;home management;driving  uses power wheelchair for mobility  -EC        Equipment Currently Used at Home walker, rolling;shower chair;grab bar;wheelchair, motorized  -EC         Pertinent History of Current Functional Problem presents with generalized weakness, possibly a UTI s/p R buttock mass excision on 4/23/24 PMH: paraplegia  -EC        Existing Precautions/Restrictions fall  -EC        Barriers to Rehab medically complex;previous functional deficit;physical barrier  -EC           Living Environment    Current Living Arrangements home  walk in shower  -EC        Home Accessibility stairs to enter home  -EC        People in Home alone  24/7 caregivers  -EC           Home Main Entrance    Number of Stairs, Main Entrance none  -EC           Pain Assessment    Pretreatment Pain Rating 0/10 - no pain  -EC        Pain Location - Side/Orientation Right  -EC        Pain Location incisional  -EC        Pain Location - hip  -EC        Pain Intervention(s) Repositioned  -EC           Pain Scale: FACES Pre/Post-Treatment    Posttreatment Pain Rating 4-->hurts little more  -EC           Cognition    Orientation Status (Cognition) oriented x 4  -EC           Range of Motion Comprehensive    General Range of Motion no range of motion deficits identified  -EC           Strength Comprehensive (MMT)    Comment, General Manual Muscle Testing (MMT) Assessment BUE 4-/5  -EC           Sensory    Additional Documentation Sensory Assessment (Somatosensory) (Group)  -EC           Sensory Assessment (Somatosensory)    Sensory Assessment (Somatosensory) UE sensation intact  n/t in B feet  -EC           Activities of Daily Living    BADL Assessment/Intervention toileting  -EC           Toileting Assessment/Training    [place] Level (Toileting) change pad/brief;perform perineal hygiene;dependent (less than 25% patient effort)  -EC        Assistive Devices (Toileting) bedpan  -EC        Position (Toileting) supine  -EC           BADL Safety/Performance    Impairments, BADL Safety/Performance endurance/activity tolerance;balance;strength;sensory awareness  anticipate balance deficits  -EC           Bed  Mobility    Bed Mobility rolling left;rolling right;scooting/bridging  -EC        Rolling Left [place] (Bed Mobility) verbal cues;moderate assist (50% patient effort)  -EC        Rolling Right [place] (Bed Mobility) verbal cues;moderate assist (50% patient effort)  -EC        Scooting/Bridging [place] (Bed Mobility) dependent (less than 25% patient effort);2 person assist  -EC        Assistive Device (Bed Mobility) bed rails;draw sheet  -EC        Comment, (Bed Mobility) fatigued after rolling multiple times in bed & declining sup>sit  -EC           Transfer Assessment/Treatment    Comment, (Transfers) pt declines attempts at this time  -EC           Safety Issues, Functional Mobility    Safety Issues Affecting Function (Mobility) friction/shear risk  -EC        Impairments Affecting Function (Mobility) balance;endurance/activity tolerance;sensation/sensory awareness;strength  -EC           Balance    Balance Assessment --  -EC        Comment, Balance pt declining sitting EOB however anticipate balance deficits given recent fxnal decline  -EC           Plan of Care Review    Plan of Care Reviewed With patient;caregiver;son  -EC        Progress no change  -EC        Outcome Evaluation OT eval complete.  Pt A&Ox4 c/o generalized weakness & fatigue. Pain noted with bed mobility near recent R hip incision. At baseline, pt lives alone but has 24/7 caregivers who provide min-modA with ADLs & transfers into electric  for mobility. Pt on bedpan upon therapist arrival, able to roll in bed with modA multiple times for john hygiene & changing her brief. Pt very fatigued after bed mob & declining attempts at sup>sit or standing. BUE ROM & MMT performed in supine, demos decreased strength. Pt left in L sidelying with wedges utilized, reports increase in comfort. She would benefit from skilled OT to maximize her safety/I with ADLs & progress her act cara back to her baseline. Will make d/c recommendations after  observing OOB activity, discharge dispo pending pt progress with therapy. Pt is agreeable to rehab if needed but does have 24/7 assist & various DME.  -EC           Vital Signs    Pre SpO2 (%) 94  -EC        O2 Delivery Pre Treatment room air  -EC           Positioning and Restraints    Pre-Treatment Position in bed  -EC        Post Treatment Position bed  -EC        In Bed side lying left;call light within reach;encouraged to call for assist;with family/caregiver;side rails up x3;with nsg  -EC           Therapy Assessment/Plan (OT)    Date of Referral to OT 05/02/24  -EC        OT Diagnosis decreased ADLs  -EC        Rehab Potential (OT) good, to achieve stated therapy goals  -EC        Criteria for Skilled Therapeutic Interventions Met (OT) yes;meets criteria;skilled treatment is necessary  -EC        Therapy Frequency (OT) 5 times/wk  -EC        Predicted Duration of Therapy Intervention (OT) 10 days  -EC        Planned Therapy Interventions (OT) activity tolerance training;adaptive equipment training;BADL retraining;functional balance retraining;occupation/activity based interventions;patient/caregiver education/training;ROM/therapeutic exercise;strengthening exercise;transfer/mobility retraining  -EC           OT Goals    Dressing Goal Selection (OT) dressing, OT goal 1  -EC        Toileting Goal Selection (OT) toileting, OT goal 1  -EC        Strength Goal Selection (OT) strength, OT goal 1  -EC           Dressing Goal 1 (OT)    Activity/Device (Dressing Goal 1, OT) upper body dressing;lower body dressing  -EC        [place]/Cues Needed (Dressing Goal 1, OT) minimum assist (75% or more patient effort)  -EC        Time Frame (Dressing Goal 1, OT) long term goal (LTG)  -EC        Progress/Outcome (Dressing Goal 1, OT) new goal  -EC           Toileting Goal 1 (OT)    Activity/Device (Toileting Goal 1, OT) adjust/manage clothing;perform perineal hygiene  -EC        [place] Level/Cues Needed (Toileting  Goal 1, OT) minimum assist (75% or more patient effort)  -EC        Time Frame (Toileting Goal 1, OT) long term goal (LTG)  -EC        Progress/Outcome (Toileting Goal 1, OT) new goal  -EC           Strength Goal 1 (OT)    Strength Goal 1 (OT) Pt will be I with BUE HEP for increased strength needed for transfers & ADLs  -EC        Time Frame (Strength Goal 1, OT) long term goal (LTG)  -EC        Progress/Outcome (Strength Goal 1, OT) new goal  -EC                  User Key  (r) = Recorded By, (t) = Taken By, (c) = Cosigned By      Initials Name Effective Dates    EC Ashley Martino, OTR/L 10/13/23 -                      Occupational Therapy Education       Title: PT OT SLP Therapies (In Progress)       Topic: Occupational Therapy (In Progress)       Point: ADL training (Done)       Description:   Instruct learner(s) on proper safety adaptation and remediation techniques during self care or transfers.   Instruct in proper use of assistive devices.                  Learning Progress Summary             Patient Acceptance, E, VU by EC at 5/3/2024 1245    Comment: role of OT, OT POC, body mechanics w bed mobility   Family Acceptance, E, VU by EC at 5/3/2024 1245    Comment: role of OT, OT POC, body mechanics w bed mobility                         Point: Home exercise program (Not Started)       Description:   Instruct learner(s) on appropriate technique for monitoring, assisting and/or progressing therapeutic exercises/activities.                  Learner Progress:  Not documented in this visit.              Point: Precautions (Done)       Description:   Instruct learner(s) on prescribed precautions during self-care and functional transfers.                  Learning Progress Summary             Patient Acceptance, E, VU by EC at 5/3/2024 1245    Comment: role of OT, OT POC, body mechanics w bed mobility   Family Acceptance, E, VU by EC at 5/3/2024 1245    Comment: role of OT, OT POC, body mechanics w bed mobility                          Point: Body mechanics (Done)       Description:   Instruct learner(s) on proper positioning and spine alignment during self-care, functional mobility activities and/or exercises.                  Learning Progress Summary             Patient Acceptance, E, VU by  at 5/3/2024 1245    Comment: role of OT, OT POC, body mechanics w bed mobility   Family Acceptance, E, VU by  at 5/3/2024 1245    Comment: role of OT, OT POC, body mechanics w bed mobility                                         User Key       Initials Effective Dates Name Provider Type Discipline     10/13/23 -  Ashley Martino, OTR/L Occupational Therapist OT                      OT Recommendation and Plan  Planned Therapy Interventions (OT): activity tolerance training, adaptive equipment training, BADL retraining, functional balance retraining, occupation/activity based interventions, patient/caregiver education/training, ROM/therapeutic exercise, strengthening exercise, transfer/mobility retraining  Therapy Frequency (OT): 5 times/wk  Plan of Care Review  Plan of Care Reviewed With: patient, caregiver, son  Progress: no change  Outcome Evaluation: OT eval complete.  Pt A&Ox4 c/o generalized weakness & fatigue. Pain noted with bed mobility near recent R hip incision. At baseline, pt lives alone but has 24/7 caregivers who provide min-modA with ADLs & transfers into electric  for mobility. Pt on bedpan upon therapist arrival, able to roll in bed with modA multiple times for john hygiene & changing her brief. Pt very fatigued after bed mob & declining attempts at sup>sit or standing. BUE ROM & MMT performed in supine, demos decreased strength. Pt left in L sidelying with wedges utilized, reports increase in comfort. She would benefit from skilled OT to maximize her safety/I with ADLs & progress her act cara back to her baseline. Will make d/c recommendations after observing OOB activity, discharge dispo pending pt progress with  therapy. Pt is agreeable to rehab if needed but does have 24/7 assist & various DME.  Plan of Care Reviewed With: patient, caregiver, son  Outcome Evaluation: OT cristofer complete.  Pt A&Ox4 c/o generalized weakness & fatigue. Pain noted with bed mobility near recent R hip incision. At baseline, pt lives alone but has 24/7 caregivers who provide min-modA with ADLs & transfers into electric  for mobility. Pt on bedpan upon therapist arrival, able to roll in bed with modA multiple times for john hygiene & changing her brief. Pt very fatigued after bed mob & declining attempts at sup>sit or standing. BUE ROM & MMT performed in supine, demos decreased strength. Pt left in L sidelying with wedges utilized, reports increase in comfort. She would benefit from skilled OT to maximize her safety/I with ADLs & progress her act cara back to her baseline. Will make d/c recommendations after observing OOB activity, discharge dispo pending pt progress with therapy. Pt is agreeable to rehab if needed but does have 24/7 assist & various DME.     Outcome Measures       Row Name 05/03/24 1200             How much help from another is currently needed...    Putting on and taking off regular lower body clothing? 1  -EC      Bathing (including washing, rinsing, and drying) 2  -EC      Toileting (which includes using toilet bed pan or urinal) 2  -EC      Putting on and taking off regular upper body clothing 3  -EC      Taking care of personal grooming (such as brushing teeth) 3  -EC      Eating meals 4  -EC      AM-PAC 6 Clicks Score (OT) 15  -EC         Functional Assessment    Outcome Measure Options AM-PAC 6 Clicks Daily Activity (OT)  -EC                User Key  (r) = Recorded By, (t) = Taken By, (c) = Cosigned By      Initials Name Provider Type    EC Ashley Martino, OTR/L Occupational Therapist                    Time Calculation:    Time Calculation- OT       Row Name 05/03/24 6460             Time Calculation- OT    OT Start Time  1038  +15 min CR  -EC      OT Stop Time 1110  -EC      OT Time Calculation (min) 32 min  -EC      OT Received On 05/03/24  -EC      OT Goal Re-Cert Due Date 05/13/24  -EC         Untimed Charges    OT Eval/Re-eval Minutes 47  -EC         Total Minutes    Untimed Charges Total Minutes 47  -EC       Total Minutes 47  -EC                User Key  (r) = Recorded By, (t) = Taken By, (c) = Cosigned By      Initials Name Provider Type    EC Ashley Martino, OTR/L Occupational Therapist                  Therapy Charges for Today       Code Description Service Date Service Provider Modifiers Qty    [id number] HC OT EVAL MOD COMPLEXITY 3 5/3/2024 Ashley Martino OTR/L GO 1                 Ashley Martino OTR/KARUNA  5/3/2024

## 2024-05-03 NOTE — PROGRESS NOTES
"Pharmacy Dosing Service  Antimicrobial Dosing  Cefepime    Assessment/Action/Plan:  Based on indication and renal function, Cefepime 1 gm IV every 8 hours has been adjusted to extended-infusion dosing of 2 gm IV every 12 hours for patient with CrCl of 30-59 ml/min. Pharmacy will continue to monitor daily and make further adjustment(s) accordingly.     Subjective:  Tawny Shin is a 68 y.o. female  initiated on extended-infusion Cefepime 2 gm IV every 12 hours for the treatment of UTI , day 1 of treatment.    Objective:  Ht: 167.6 cm (66\"); Wt: 95.7 kg (211 lb)  Estimated Creatinine Clearance: 32.3 mL/min (A) (by C-G formula based on SCr of 1.89 mg/dL (H)).   Creatinine   Date Value Ref Range Status   05/02/2024 1.89 (H) 0.57 - 1.00 mg/dL Final      Lab Results   Component Value Date    WBC 8.03 05/02/2024      Baseline culture results:  Microbiology Results (last 10 days)       Procedure Component Value - Date/Time    COVID-19, FLU A/B, RSV PCR 1 HR TAT - Swab, Nasopharynx [[id number]]  (Normal) Collected: 05/02/24 1826    Lab Status: Final result Specimen: Swab from Nasopharynx Updated: 05/02/24 1910     COVID19 Not Detected     Influenza A PCR Not Detected     Influenza B PCR Not Detected     RSV, PCR Not Detected    Narrative:      Fact sheet for providers: https://www.fda.gov/media/215672/download    Fact sheet for patients: https://www.fda.gov/media/962870/download    Test performed by PCR.    Wound Culture - Surgical Site, Buttock [[id number]]  (Abnormal)  (Susceptibility) Collected: 04/23/24 1100    Lab Status: Final result Specimen: Surgical Site from Buttock Updated: 04/26/24 0730     Wound Culture Light growth (2+) Escherichia coli      Light growth (2+) Proteus mirabilis     Gram Stain Rare (1+) WBCs seen      Few (2+) Gram positive cocci      Few (2+) Gram negative bacilli    Susceptibility        Escherichia coli      LICHA      Amoxicillin + Clavulanate 4 ug/ml Susceptible      Ampicillin 4 ug/ml " Susceptible      Ampicillin + Sulbactam <=2 ug/ml Susceptible      Cefepime <=1 ug/ml Susceptible      Ceftazidime <=1 ug/ml Susceptible      Ceftriaxone <=1 ug/ml Susceptible      Gentamicin <=1 ug/ml Susceptible      Levofloxacin <=0.12 ug/ml Susceptible      Piperacillin + Tazobactam <=4 ug/ml Susceptible      Tetracycline <=1 ug/ml Susceptible      Trimethoprim + Sulfamethoxazole <=20 ug/ml Susceptible                       Susceptibility        Proteus mirabilis      LICHA      Amoxicillin + Clavulanate 4 ug/ml Susceptible      Ampicillin <=2 ug/ml Susceptible      Ampicillin + Sulbactam <=2 ug/ml Susceptible      Cefepime <=1 ug/ml Susceptible      Ceftazidime <=1 ug/ml Susceptible      Ceftriaxone <=1 ug/ml Susceptible      Gentamicin <=1 ug/ml Susceptible      Levofloxacin <=0.12 ug/ml Susceptible      Piperacillin + Tazobactam <=4 ug/ml Susceptible      Tetracycline >=16 ug/ml Resistant      Trimethoprim + Sulfamethoxazole <=20 ug/ml Susceptible                       Susceptibility Comments       Escherichia coli    Cefazolin sensitivity will not be reported for Enterobacteriaceae in non-urine isolates. If cefazolin is preferred, please call the microbiology lab to request an E-test.  With the exception of urinary-sourced infections, aminoglycosides should not be used as monotherapy.      Proteus mirabilis    Cefazolin sensitivity will not be reported for Enterobacteriaceae in non-urine isolates. If cefazolin is preferred, please call the microbiology lab to request an E-test.  With the exception of urinary-sourced infections, aminoglycosides should not be used as monotherapy.                       Segundo Biggs, PharmD  05/02/24 22:46 CDT

## 2024-05-03 NOTE — ED NOTES
The following fall interventions were initiated:    [x] Patient and/or family given education   [x] Call light within reach and educated on how to use   [x] Bed rails up per protocol    [x] Bed locked and in the lowest position   [] Bed alarm set and on loudest setting   [x] Fall wrist band applied   [x] Non skid footwear applied   [x] Room free of clutter   [x] Patient items within reach   [x] Adequate lighting provided  [x] Falls sign present    [] Patient moved closer to nursing station   [] Restraints applied

## 2024-05-03 NOTE — ED NOTES
"Nursing report ED to floor  Tawny Shin  68 y.o.  female    HPI:   Chief Complaint   Patient presents with    Weakness - Generalized       Admitting doctor:   Juan C Camacho DO    Consulting provider(s):  Consults       Date and Time Order Name Status Description    4/21/2024 12:38 PM Inpatient General Surgery Consult Completed              Admitting diagnosis:   The primary encounter diagnosis was Weakness. A diagnosis of Urinary tract infection without hematuria, site unspecified was also pertinent to this visit.    Code status:   Current Code Status       Date Active Code Status Order ID Comments User Context       Prior            Allergies:   Atorvastatin, Amoxicillin, Escitalopram, Nabumetone, Niacin er, Penicillin g, Penicillins, and Simvastatin    Intake and Output  No intake or output data in the 24 hours ending 05/02/24 2122    Weight:       05/02/24 1703   Weight: 93.9 kg (207 lb)       Most recent vitals:   Vitals:    05/02/24 1703 05/02/24 2031 05/02/24 2116   BP: 120/62 160/73 180/83   Pulse: 72 77 84   Resp: 18     Temp: 98 °F (36.7 °C)     SpO2: 95% 93% 94%   Weight: 93.9 kg (207 lb)     Height: 167.6 cm (66\")       Oxygen Therapy: .    Active LDAs/IV Access:   Lines, Drains & Airways       Active LDAs       Name Placement date Placement time Site Days    Peripheral IV 05/02/24 2105 Anterior;Proximal;Right Forearm 05/02/24 2105  Forearm  less than 1                    Labs (abnormal labs have a star):   Labs Reviewed   COMPREHENSIVE METABOLIC PANEL - Abnormal; Notable for the following components:       Result Value    Glucose 110 (*)     BUN 29 (*)     Creatinine 1.89 (*)     Albumin 3.3 (*)     eGFR 28.3 (*)     All other components within normal limits    Narrative:     GFR Normal >60  Chronic Kidney Disease <60  Kidney Failure <15     URINALYSIS W/ CULTURE IF INDICATED - Abnormal; Notable for the following components:    Appearance, UA Turbid (*)     Blood, UA Large (3+) (*)     " "Protein, UA >=300 mg/dL (3+) (*)     Leuk Esterase, UA Large (3+) (*)     Nitrite, UA Positive (*)     All other components within normal limits    Narrative:     In absence of clinical symptoms, the presence of pyuria, bacteria, and/or nitrites on the urinalysis result does not correlate with infection.   D-DIMER, QUANTITATIVE - Abnormal; Notable for the following components:    D-Dimer, Quantitative 1.25 (*)     All other components within normal limits    Narrative:     According to the assay 's published package insert, a normal (<0.50 MCGFEU/mL) D-dimer result in conjunction with a non-high clinical probability assessment, excludes deep vein thrombosis (DVT) and pulmonary embolism (PE) with high sensitivity.    D-dimer values increase with age and this can make VTE exclusion of an older population difficult. To address this, the [employer], based on best available evidence and recent guidelines, recommends that clinicians use age-adjusted D-dimer thresholds in patients greater than 50 years of age with: a) a low probability of PE who do not meet all Pulmonary Embolism Rule Out Criteria, or b) in those with intermediate probability of PE.   The formula for an age-adjusted D-dimer cut-off is \"age/100\".  For example, a 60 year old patient would have an age-adjusted cut-off of 0.60 MCGFEU/mL and an 80 year old 0.80 MCGFEU/mL.   TROPONIN - Abnormal; Notable for the following components:    HS Troponin T 56 (*)     All other components within normal limits    Narrative:     High Sensitive Troponin T Reference Range:  <14.0 ng/L- Negative Female for AMI  <22.0 ng/L- Negative Male for AMI  >=14 - Abnormal Female indicating possible myocardial injury.  >=22 - Abnormal Male indicating possible myocardial injury.   Clinicians would have to utilize clinical acumen, EKG, Troponin, and serial changes to determine if it is an Acute Myocardial Infarction or myocardial injury due to an underlying " chronic condition.        BNP (IN-HOUSE) - Abnormal; Notable for the following components:    proBNP 1,900.0 (*)     All other components within normal limits    Narrative:     This assay is used as an aid in the diagnosis of individuals suspected of having heart failure. It can be used as an aid in the diagnosis of acute decompensated heart failure (ADHF) in patients presenting with signs and symptoms of ADHF to the emergency department (ED). In addition, NT-proBNP of <300 pg/mL indicates ADHF is not likely.    Age Range Result Interpretation  NT-proBNP Concentration (pg/mL:      <50             Positive            >450                   Gray                 300-450                    Negative             <300    50-75           Positive            >900                  Gray                300-900                  Negative            <300      >75             Positive            >1800                  Gray                300-1800                  Negative            <300   PROTIME-INR - Abnormal; Notable for the following components:    Protime 19.3 (*)     INR 1.56 (*)     All other components within normal limits   APTT - Abnormal; Notable for the following components:    PTT 68.0 (*)     All other components within normal limits   CBC WITH AUTO DIFFERENTIAL - Abnormal; Notable for the following components:    RBC 3.28 (*)     Hemoglobin 9.7 (*)     Hematocrit 31.6 (*)     MCHC 30.7 (*)     RDW 17.0 (*)     RDW-SD 58.6 (*)     Monocyte % 13.6 (*)     Immature Grans % 1.1 (*)     Monocytes, Absolute 1.09 (*)     Immature Grans, Absolute 0.09 (*)     nRBC 0.4 (*)     All other components within normal limits   URINALYSIS, MICROSCOPIC ONLY - Abnormal; Notable for the following components:    RBC, UA 21-50 (*)     WBC, UA Too Numerous to Count (*)     Bacteria, UA 3+ (*)     All other components within normal limits   COVID-19/FLUA&B/RSV, NP SWAB IN TRANSPORT MEDIA 1 HR TAT - Normal    Narrative:     Fact sheet for  providers: https://www.fda.gov/media/873554/download    Fact sheet for patients: https://www.fda.gov/media/610625/download    Test performed by PCR.   LACTIC ACID, PLASMA - Normal   MAGNESIUM - Normal   URINE CULTURE   BLOOD CULTURE   BLOOD CULTURE   HIGH SENSITIVITIY TROPONIN T 2HR   CBC AND DIFFERENTIAL    Narrative:     The following orders were created for panel order CBC & Differential.  Procedure                               Abnormality         Status                     ---------                               -----------         ------                     CBC Auto Differential[[id number]]        Abnormal            Final result                 Please view results for these tests on the individual orders.       Meds given in ED:   Medications   sodium chloride 0.9 % flush 10 mL (has no administration in time range)   cefepime 2000 mg IVPB in 100 mL NS (MBP) (has no administration in time range)   sodium chloride 0.9 % bolus 1,000 mL ( Intravenous Currently Infusing 5/2/24 2111)           NIH Stroke Scale:       Isolation/Infection(s):  No active isolations   MRSA, MDR Pseudomonas, CR Pseudomonas CRPA     COVID Testing  Collected .  Resulted .    Nursing report ED to floor:  Mental status: .AxOx4  Ambulatory status: . Fall risk, weakness  Precautions: .    ED nurse phone extentsion- ..

## 2024-05-03 NOTE — CONSULTS
[facility]  INPATIENT WOUND & OSTOMY CONSULTATION    Today's Date: 05/03/24    Patient Name: Tawny Shin  MRN: [id number]  [facility]: [id number]  PCP: Moises Oliveira MD  Referring Provider:   Consulting Provider (From admission, onward)      Start Ordered     Status Ordering Provider    05/02/24 2206 05/02/24 2205  Inpatient Wound Care MD Consult  Once        Specialty:  Wound Care  Provider:  Polly Senior APRN Acknowledged MUMMANENI, SUNDEEP           Attending Provider: Christos Min DO  Length of Stay: 0    SUBJECTIVE   Chief Complaint: At risk for skin breakdown    HPI: Tawny Shin, a 68 y.o.female, presents with a past medical history of arthritis, hypertension, myocardial infarction, hyperlipidemia, coronary artery disease, Sjogren's disease.  A full past medical history as listed below.  Inpatient wound care consulted due to patient being at risk for skin breakdown.  Patient has had issues with skin breakdown in the past.  Currently all areas are healed.  Sacrum is pink and blanchable.  No active pressure injuries at this time.      Visit Dx:    ICD-10-CM ICD-9-CM   1. Weakness  R53.1 780.79   2. Urinary tract infection without hematuria, site unspecified  N39.0 599.0   3. Dysphagia, unspecified type [R13.10]  R13.10 787.20       Hospital Problem List:     Generalized weakness    Chronic anticoagulation    Anemia of chronic disease    UTI (urinary tract infection)    Stage 3b chronic kidney disease    Paralysis    History of urinary retention      History:   Past Medical History:   Diagnosis Date    Age-related osteoporosis with current pathological fracture 05/27/2020    Arthritis     Asthma     Bilateral bunions 12/23/2020    Cancer     Cardiac pacemaker syndrome 12/23/2020    Overview:  - heart block - implanted 11/16    Charcot's joint of foot, left 12/23/2020    Chronic deep vein thrombosis (DVT) of right lower extremity 06/23/2021    Chronic pain syndrome  "06/22/2021    Chronic sinusitis     COPD (chronic obstructive pulmonary disease)     Coronary artery disease     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    Elevated cholesterol     Eustachian tube dysfunction     Heart disease     Herpes simplex     History of transfusion     Hyperlipidemia     Hypertension     Hypothyroidism 12/23/2020    Intrinsic asthma 12/23/2020    Knee dislocation     Labral tear of right hip joint     Laryngitis sicca     Laryngitis, chronic     Left carotid bruit 03/09/2016    MI (myocardial infarction)     Myalgia due to statin 06/25/2019    Open wound of right hip 09/14/2021    Osteomyelitis of right femur 07/06/2021    Otorrhea     Pacemaker 11/17/2016    Primary osteoarthritis of left knee 12/23/2020    Psoriasis vulgaris 12/23/2020    S/P coronary artery stent placement 03/09/2016    Sensorineural hearing loss     Seropositive rheumatoid arthritis of multiple sites 12/27/2019    Overview:  -myochrysine '93-'96 -methotrexate '96--->11/98;r/s  restarted 2/99--> 8/14 (anemia) -sulfasalazine- not effective -penicillamine 6/98-->10/98; no effect -leflunomide 11/98--> - Humira '13-->didn't take - Enbrel 12/14-->3/15- no effect!   Last Assessment & Plan:  - \"aching all over\" because she had to be off her anti-rheumatic drugs for 2 weeks in preparation for her R knee surgery - he    Sick sinus syndrome 12/27/2019    Sjogren's disease     Spondylolisthesis of lumbar region 01/17/2018    Syncope, recurrent 02/08/2021    Urinary tract infection      Past Surgical History:   Procedure Laterality Date    A-V CARDIAC PACEMAKER INSERTION  2016    ATRIAL CARDIAC PACEMAKER INSERTION      CARDIAC CATHETERIZATION      CATARACT EXTRACTION      CERVICAL CORPECTOMY N/A 3/3/2021    Procedure: CERVICAL 6 CORPECTOMY WITH TITANIUM CAGE WITH NEURO MONITORING;  Surgeon: Bandar Shea MD;  Location: [facility];  Service: Neurosurgery;  Laterality: N/A;    COLONOSCOPY  11/08/2011    One fold in the " ascending colon which showed ulcer otherwise normal exam    COLONOSCOPY  11/12/2004    Normal exam repeat in five years    CORONARY ANGIOPLASTY WITH STENT PLACEMENT      X 2; 2013 & 2014    ENDOSCOPY  07/10/2014    Normal exam    FLAP LEG Right 9/14/2021    Procedure: RIGHT GLUTEAL FASCIOCUTANEOUS ADVANCEMENT FLAP AND RIGHT TENSOR FASCIAL JESSICA FLAP;  Surgeon: Amadeo Turner MD;  Location:  [facility];  Service: Plastics;  Laterality: Right;    HIP ABDUCTION TENOTOMY BILATERAL Right 1/14/2021    Procedure: RIGHT HIP GLUTEUS MEDLUS / MINIMUS REPAIR, POSSIBLE ACHILLES ALLOGRAFT;  Surgeon: Nino Carlson MD;  Location:  [facility];  Service: Orthopedics;  Laterality: Right;    INCISION AND DRAINAGE ABSCESS Right 6/4/2022    Procedure: INCISION AND DRAINAGE ABSCESS right hip;  Surgeon: Magda Salcido MD;  Location:  [facility];  Service: General;  Laterality: Right;    INCISION AND DRAINAGE ABSCESS Right 6/10/2022    Procedure: RIGHT HIP INCISION AND DRAINAGE. MD NEEDS 3L VANC IRRIGATION, CURRETTES, DAICANS, KERLEX ROLLS;  Surgeon: Amadeo Turner MD;  Location:  [facility];  Service: Plastics;  Laterality: Right;    INCISION AND DRAINAGE HIP Right 2/9/2021    Procedure: HIP INCISION AND DRAINAGE;  Surgeon: Nino Carlson MD;  Location:  [facility];  Service: Orthopedics;  Laterality: Right;    INCISION AND DRAINAGE LEG Right 10/24/2021    Procedure: INCISION AND DRAINAGE LOWER EXTREMITY;  Surgeon: Amadeo Turner MD;  Location:  [facility];  Service: Plastics;  Laterality: Right;    INCISION AND DRAINAGE OF WOUND Right 7/8/2021    Procedure: INCISION AND DRAINAGE WOUND RIGHT HIP;  Surgeon: James Huntley MD;  Location:  [facility];  Service: Orthopedics;  Laterality: Right;    JOINT REPLACEMENT      KYPHOPLASTY WITH BIOPSY Bilateral 10/26/2021    Procedure: THOARCIC 12 KYPHOPLASTY WITH BIOPSY;  Surgeon: Bandar Shea MD;  Location:  [facility];  Service: Neurosurgery;  Laterality: Bilateral;    LEG  DEBRIDEMENT Right 9/14/2021    Procedure: DEBRIDEMENT OF RIGHT HIP WOUND, RIGHT GLUTEAL FASCIOCUTANEOUS ADVANCEMENT FLAP AND RIGHT TENSOR FASCIAL JESSICA FLAP;  Surgeon: Amadeo Turner MD;  Location:  [facility];  Service: Plastics;  Laterality: Right;    LUMBAR DISCECTOMY Right 3/23/2021    Procedure: LUMBAR DISCECTOMY MICRO, Lumbar 1/2 right;  Surgeon: Bandar Shea MD;  Location:  [facility];  Service: Neurosurgery;  Laterality: Right;    LUMBAR FUSION N/A 1/19/2018    Procedure: L3-4,L4-5 DECOMPRESSION, POSTERIOR SPINAL FUSION WITH INSTRUMENTATION;  Surgeon: Fortino Oropeza MD;  Location:  [facility];  Service:     LUMBAR LAMINECTOMY WITH FUSION Left 1/17/2018    Procedure: LEFT L3-4 L4-5 LATERAL LUMBAR INTERBODY FUSION;  Surgeon: Fortino Oropeza MD;  Location:  [facility];  Service:     MASS EXCISION Right 4/23/2024    Procedure: RIGHT BUTTOCK MASS EXCISION;  Surgeon: Moises Keyes MD;  Location:  [facility];  Service: General;  Laterality: Right;    MYRINGOTOMY W/ TUBES  09/04/2014    TUBES NO LONGER IN PLACE    OTHER SURGICAL HISTORY      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 so hardware was removed and spacers were placed    REPLACEMENT TOTAL KNEE Right      Social History     Socioeconomic History    Marital status:    Tobacco Use    Smoking status: Never     Passive exposure: Never    Smokeless tobacco: Never   Vaping Use    Vaping status: Never Used   Substance and Sexual Activity    Alcohol use: No    Drug use: Never    Sexual activity: Defer     Birth control/protection: Abstinence     Family History   Problem Relation Age of Onset    Cancer Mother         Lung cancer    Heart disease Father         Doied of heart. Attack       Allergies:  Allergies   Allergen Reactions    Atorvastatin Other (See Comments)     LEG CRAMPS      Amoxicillin Rash    Escitalopram Rash    Nabumetone Rash    Niacin Er Rash    Penicillin G Rash    Penicillins Rash    Simvastatin Rash       Medications:    Current  Facility-Administered Medications:     acetaminophen (TYLENOL) tablet 650 mg, 650 mg, Oral, Q4H PRN **OR** acetaminophen (TYLENOL) 160 MG/5ML oral solution 650 mg, 650 mg, Oral, Q4H PRN **OR** acetaminophen (TYLENOL) suppository 650 mg, 650 mg, Rectal, Q4H PRN, Mayra Chong, APRN    apixaban (ELIQUIS) tablet 5 mg, 5 mg, Oral, BID, Mayra Chong APRN, 5 mg at 05/03/24 0935    aspirin EC tablet 81 mg, 81 mg, Oral, Daily, Mayra Chong APRN, 81 mg at 05/03/24 0936    sennosides-docusate (PERICOLACE) 8.6-50 MG per tablet 2 tablet, 2 tablet, Oral, BID PRN **AND** polyethylene glycol (MIRALAX) packet 17 g, 17 g, Oral, Daily PRN **AND** bisacodyl (DULCOLAX) EC tablet 5 mg, 5 mg, Oral, Daily PRN **AND** bisacodyl (DULCOLAX) suppository 10 mg, 10 mg, Rectal, Daily PRN, Mayra Chong APRN    carvedilol (COREG) tablet 25 mg, 25 mg, Oral, BID With Meals, Mayra Chong APRN, 25 mg at 05/03/24 0935    cefepime 2000 mg IVPB in 100 mL NS (MBP), 2,000 mg, Intravenous, Q12H, Mayra Chong APRN, Stopped at 05/03/24 1314    Diclofenac Sodium (VOLTAREN) 1 % gel 4 g, 4 g, Topical, 4x Daily PRN, Mayra Chong APRN    DULoxetine (CYMBALTA) DR capsule 60 mg, 60 mg, Oral, Daily, Mayra Chong APRN, 60 mg at 05/03/24 0935    folic acid (FOLVITE) tablet 1 mg, 1 mg, Oral, Daily, Mayra Chong APRN, 1 mg at 05/03/24 0935    [Held by provider] furosemide (LASIX) tablet 40 mg, 40 mg, Oral, Daily, Chong, Mayra D, APRN    isosorbide mononitrate (IMDUR) 24 hr tablet 60 mg, 60 mg, Oral, Daily, Mayra Chong, DANIELA, 60 mg at 05/03/24 0936    leflunomide (ARAVA) tablet 20 mg, 20 mg, Oral, Nightly, Mayra Chong, APRN, 20 mg at 05/02/24 2305    Lidocaine 4 % 1 patch, 1 patch, Transdermal, Daily PRN, Mayra Chong, APRN    losartan (COZAAR) tablet 50 mg, 50 mg, Oral, Daily, Mayra Chong, APRN, 50 mg at 05/03/24 0935    magnesium hydroxide (MILK OF MAGNESIA) suspension 10 mL, 10 mL, Oral,  Daily PRN, Mayra Chong, APRN    nitroglycerin (NITROSTAT) SL tablet 0.4 mg, 0.4 mg, Sublingual, Q5 Min PRN, Mayra Chong, APRN    ondansetron ODT (ZOFRAN-ODT) disintegrating tablet 4 mg, 4 mg, Oral, Q6H PRN **OR** ondansetron (ZOFRAN) injection 4 mg, 4 mg, Intravenous, Q6H PRN, Mayra Chong, APRN    potassium chloride (KLOR-CON M20) CR tablet 20 mEq, 20 mEq, Oral, Daily, Christos Min, DO, 20 mEq at 05/03/24 0936    predniSONE (DELTASONE) tablet 5 mg, 5 mg, Oral, Daily, Mayra Chong APRN, 5 mg at 05/03/24 0935    [COMPLETED] Insert Peripheral IV, , , Once **AND** sodium chloride 0.9 % flush 10 mL, 10 mL, Intravenous, PRN, Joselito Moon MD    sodium chloride 0.9 % flush 10 mL, 10 mL, Intravenous, Q12H, Mayra Chong, APRN, 10 mL at 05/03/24 0936    sodium chloride 0.9 % flush 10 mL, 10 mL, Intravenous, PRN, Mayra Chong, APRN    sodium chloride 0.9 % infusion 40 mL, 40 mL, Intravenous, PRN, Mayra Chong, APRN      OBJECTIVE     Vitals:    05/03/24 1157   BP: 100/48   Pulse: 80   Resp: 20   Temp: 98.3 °F (36.8 °C)   SpO2: 92%       PHYSICAL EXAM:   Physical Exam  Vitals and nursing note reviewed.   Constitutional:       General: She is sleeping.      Appearance: She is obese.      Comments: Body mass index is 35.12 kg/m².    HENT:      Head: Normocephalic and atraumatic.   Eyes:      General: Lids are normal. Gaze aligned appropriately.   Cardiovascular:      Rate and Rhythm: Normal rate and regular rhythm.   Pulmonary:      Effort: Pulmonary effort is normal. No respiratory distress.   Musculoskeletal:      Cervical back: Normal range of motion and neck supple.   Feet:      Right foot:      Skin integrity: Skin integrity normal. No ulcer.      Left foot:      Skin integrity: Skin integrity normal. No ulcer.   Skin:     General: Skin is warm and dry.      Findings: Erythema present.      Comments: Slight erythema related to moisture associated skin damage.  No denudation.  No  breaks in skin.  No open ulceration.  All areas are blanchable.   Neurological:      Mental Status: She is oriented to person, place, and time and easily aroused. She is lethargic.      Motor: Weakness present.   Psychiatric:         Attention and Perception: Attention normal.         Mood and Affect: Mood normal.         Speech: Speech normal.         Behavior: Behavior is cooperative.            Results Review:  Lab Results (last 48 hours)       Procedure Component Value Units Date/Time    Blood Culture ID, PCR - Blood, Hand, Left [[id number]]  (Abnormal) Collected: 05/02/24 2130    Specimen: Blood from Hand, Left Updated: 05/03/24 1229     BCID, PCR Enterobacter cloacae complex. Identification by BCID2 PCR.     BOTTLE TYPE Pediatric Bottle    Narrative:      No resistance genes detected.    Blood Culture - Blood, Hand, Left [[id number]]  (Abnormal) Collected: 05/02/24 2130    Specimen: Blood from Hand, Left Updated: 05/03/24 1120     Blood Culture Abnormal Stain     Gram Stain Pediatric Bottle Gram negative bacilli    Basic Metabolic Panel [[id number]]  (Abnormal) Collected: 05/03/24 0518    Specimen: Blood Updated: 05/03/24 0603     Glucose 121 mg/dL      BUN 26 mg/dL      Creatinine 1.62 mg/dL      Sodium 140 mmol/L      Potassium 2.9 mmol/L      Chloride 102 mmol/L      CO2 26.0 mmol/L      Calcium 8.8 mg/dL      BUN/Creatinine Ratio 16.0     Anion Gap 12.0 mmol/L      eGFR 34.0 mL/min/1.73     Narrative:      GFR Normal >60  Chronic Kidney Disease <60  Kidney Failure <15      CBC (No Diff) [[id number]]  (Abnormal) Collected: 05/03/24 0517    Specimen: Blood Updated: 05/03/24 0549     WBC 7.86 10*3/mm3      RBC 3.25 10*6/mm3      Hemoglobin 9.5 g/dL      Hematocrit 31.1 %      MCV 95.7 fL      MCH 29.2 pg      MCHC 30.5 g/dL      RDW 16.9 %      RDW-SD 58.6 fl      MPV 11.7 fL      Platelets 149 10*3/mm3     High Sensitivity Troponin T 2Hr [[id number]]  (Abnormal) Collected: 05/02/24 2130    Specimen: Blood  Updated: 05/02/24 2204     HS Troponin T 50 ng/L      Troponin T Delta -6 ng/L     Narrative:      High Sensitive Troponin T Reference Range:  <14.0 ng/L- Negative Female for AMI  <22.0 ng/L- Negative Male for AMI  >=14 - Abnormal Female indicating possible myocardial injury.  >=22 - Abnormal Male indicating possible myocardial injury.   Clinicians would have to utilize clinical acumen, EKG, Troponin, and serial changes to determine if it is an Acute Myocardial Infarction or myocardial injury due to an underlying chronic condition.         Blood Culture - Blood, Arm, Right [[id number]] Collected: 05/02/24 2130    Specimen: Blood from Arm, Right Updated: 05/02/24 2143    Urinalysis With Culture If Indicated - Straight Cath [[id number]]  (Abnormal) Collected: 05/02/24 1956    Specimen: Urine from Straight Cath Updated: 05/02/24 2005     Color, UA Yellow     Appearance, UA Turbid     pH, UA 6.0     Specific Gravity, UA 1.025     Glucose, UA Negative     Ketones, UA Negative     Bilirubin, UA Negative     Blood, UA Large (3+)     Protein, UA >=300 mg/dL (3+)     Leuk Esterase, UA Large (3+)     Nitrite, UA Positive     Urobilinogen, UA 0.2 E.U./dL    Narrative:      In absence of clinical symptoms, the presence of pyuria, bacteria, and/or nitrites on the urinalysis result does not correlate with infection.    Urinalysis, Microscopic Only - Straight Cath [[id number]]  (Abnormal) Collected: 05/02/24 1956    Specimen: Urine from Straight Cath Updated: 05/02/24 2005     RBC, UA 21-50 /HPF      WBC, UA Too Numerous to Count /HPF      Bacteria, UA 3+ /HPF      Squamous Epithelial Cells, UA None Seen /HPF      Methodology Manual Light Microscopy    Urine Culture - Urine, Straight Cath [[id number]] Collected: 05/02/24 1956    Specimen: Urine from Straight Cath Updated: 05/02/24 2005    High Sensitivity Troponin T [[id number]]  (Abnormal) Collected: 05/02/24 1905    Specimen: Blood Updated: 05/02/24 1943     HS Troponin T 56 ng/L      Narrative:      High Sensitive Troponin T Reference Range:  <14.0 ng/L- Negative Female for AMI  <22.0 ng/L- Negative Male for AMI  >=14 - Abnormal Female indicating possible myocardial injury.  >=22 - Abnormal Male indicating possible myocardial injury.   Clinicians would have to utilize clinical acumen, EKG, Troponin, and serial changes to determine if it is an Acute Myocardial Infarction or myocardial injury due to an underlying chronic condition.         Comprehensive Metabolic Panel [[id number]]  (Abnormal) Collected: 05/02/24 1905    Specimen: Blood Updated: 05/02/24 1938     Glucose 110 mg/dL      BUN 29 mg/dL      Creatinine 1.89 mg/dL      Sodium 139 mmol/L      Potassium 3.5 mmol/L      Comment: Slight hemolysis detected by analyzer. Result may be falsely elevated.        Chloride 99 mmol/L      CO2 29.0 mmol/L      Calcium 9.3 mg/dL      Total Protein 6.9 g/dL      Albumin 3.3 g/dL      ALT (SGPT) 24 U/L      AST (SGOT) 26 U/L      Comment: Slight hemolysis detected by analyzer. Result may be falsely elevated.        Alkaline Phosphatase 68 U/L      Total Bilirubin 0.4 mg/dL      Globulin 3.6 gm/dL      A/G Ratio 0.9 g/dL      BUN/Creatinine Ratio 15.3     Anion Gap 11.0 mmol/L      eGFR 28.3 mL/min/1.73     Narrative:      GFR Normal >60  Chronic Kidney Disease <60  Kidney Failure <15      Lactic Acid, Plasma [[id number]]  (Normal) Collected: 05/02/24 1905    Specimen: Blood Updated: 05/02/24 1933     Lactate 0.9 mmol/L     Magnesium [[id number]]  (Normal) Collected: 05/02/24 1905    Specimen: Blood Updated: 05/02/24 1933     Magnesium 1.8 mg/dL     BNP [[id number]]  (Abnormal) Collected: 05/02/24 1905    Specimen: Blood Updated: 05/02/24 1932     proBNP 1,900.0 pg/mL     Narrative:      This assay is used as an aid in the diagnosis of individuals suspected of having heart failure. It can be used as an aid in the diagnosis of acute decompensated heart failure (ADHF) in patients presenting with signs and  "symptoms of ADHF to the emergency department (ED). In addition, NT-proBNP of <300 pg/mL indicates ADHF is not likely.    Age Range Result Interpretation  NT-proBNP Concentration (pg/mL:      <50             Positive            >450                   Gray                 300-450                    Negative             <300    50-75           Positive            >900                  Gray                300-900                  Negative            <300      >75             Positive            >1800                  Gray                300-1800                  Negative            <300    D-dimer, Quantitative [[id number]]  (Abnormal) Collected: 05/02/24 1905    Specimen: Blood Updated: 05/02/24 1929     D-Dimer, Quantitative 1.25 MCGFEU/mL     Narrative:      According to the assay 's published package insert, a normal (<0.50 MCGFEU/mL) D-dimer result in conjunction with a non-high clinical probability assessment, excludes deep vein thrombosis (DVT) and pulmonary embolism (PE) with high sensitivity.    D-dimer values increase with age and this can make VTE exclusion of an older population difficult. To address this, the [employer], based on best available evidence and recent guidelines, recommends that clinicians use age-adjusted D-dimer thresholds in patients greater than 50 years of age with: a) a low probability of PE who do not meet all Pulmonary Embolism Rule Out Criteria, or b) in those with intermediate probability of PE.   The formula for an age-adjusted D-dimer cut-off is \"age/100\".  For example, a 60 year old patient would have an age-adjusted cut-off of 0.60 MCGFEU/mL and an 80 year old 0.80 MCGFEU/mL.    Protime-INR [[id number]]  (Abnormal) Collected: 05/02/24 1905    Specimen: Blood Updated: 05/02/24 1929     Protime 19.3 Seconds      INR 1.56    aPTT [[id number]]  (Abnormal) Collected: 05/02/24 1905    Specimen: Blood Updated: 05/02/24 1929     PTT 68.0 seconds     CBC & " Differential [[id number]]  (Abnormal) Collected: 05/02/24 1905    Specimen: Blood Updated: 05/02/24 1923    Narrative:      The following orders were created for panel order CBC & Differential.  Procedure                               Abnormality         Status                     ---------                               -----------         ------                     CBC Auto Differential[[id number]]        Abnormal            Final result                 Please view results for these tests on the individual orders.    CBC Auto Differential [[id number]]  (Abnormal) Collected: 05/02/24 1905    Specimen: Blood Updated: 05/02/24 1923     WBC 8.03 10*3/mm3      RBC 3.28 10*6/mm3      Hemoglobin 9.7 g/dL      Hematocrit 31.6 %      MCV 96.3 fL      MCH 29.6 pg      MCHC 30.7 g/dL      RDW 17.0 %      RDW-SD 58.6 fl      MPV 11.6 fL      Platelets 168 10*3/mm3      Neutrophil % 57.3 %      Lymphocyte % 26.5 %      Monocyte % 13.6 %      Eosinophil % 1.0 %      Basophil % 0.5 %      Immature Grans % 1.1 %      Neutrophils, Absolute 4.60 10*3/mm3      Lymphocytes, Absolute 2.13 10*3/mm3      Monocytes, Absolute 1.09 10*3/mm3      Eosinophils, Absolute 0.08 10*3/mm3      Basophils, Absolute 0.04 10*3/mm3      Immature Grans, Absolute 0.09 10*3/mm3      nRBC 0.4 /100 WBC     COVID-19, FLU A/B, RSV PCR 1 HR TAT - Swab, Nasopharynx [[id number]]  (Normal) Collected: 05/02/24 1826    Specimen: Swab from Nasopharynx Updated: 05/02/24 1910     COVID19 Not Detected     Influenza A PCR Not Detected     Influenza B PCR Not Detected     RSV, PCR Not Detected    Narrative:      Fact sheet for providers: https://www.fda.gov/media/602711/download    Fact sheet for patients: https://www.fda.gov/media/267642/download    Test performed by PCR.          Imaging Results (Last 72 Hours)       Procedure Component Value Units Date/Time    XR Chest 1 View [[id number]] Collected: 05/02/24 1806     Updated: 05/02/24 1810    Narrative:      EXAM: XR CHEST  1 VW- 5/2/2024 4:55 PM     HISTORY: Weakness       COMPARISON: 4/19/2024.     TECHNIQUE: Single frontal radiograph of the chest was obtained.     FINDINGS:     Support Devices: Cardiac pacemaker device with leads overlying the right  atrium and right ventricle.     Cardiac and Mediastinal Silhouettes: Normal.     Lungs/Pleura: No focal consolidation. No sizable pleural effusion. No  visible pneumothorax.     Osseous structures: No acute osseous finding.     Other: None.       Impression:         No acute cardiopulmonary abnormality.           This report was signed and finalized on 5/2/2024 6:07 PM by Marco Tovar.                  ASSESSMENT/PLAN       Examination and evaluation of wound(s) was performed.    DIAGNOSIS:   Incontinence associated dermatitis  At high risk for skin breakdown  Generalized weakness  Impaired mobility and ADLs    Bacteremia due to Enterobacter species    Chronic embolism and thrombosis of unspecified deep veins of left lower extremity    Chronic anticoagulation    Iron deficiency anemia    Rheumatoid arthritis involving multiple sites    Coronary artery disease involving native coronary artery of native heart without angina pectoris    Sick sinus syndrome    Anemia of chronic disease    Stage 3b chronic kidney disease    Generalized weakness    History of urinary retention       PLAN:   Orders placed for wound care and pressure/moisture management as listed below.       Start     Ordered    05/03/24 1327  Turn Patient  Now Then Every 2 Hours         05/03/24 1326    05/03/24 1327  Head of Bed 30 Degrees or Less (Unless Contraindicated)  Until Discontinued         05/03/24 1326    05/03/24 1327  Elevate Heels Off of Bed  Until Discontinued         05/03/24 1326    05/03/24 1327  Use Seat Cushion When Up In Chair  Continuous         05/03/24 1326    05/03/24 1327  Use Repositioning Wedge to Position Patient  Continuous        Comments: Use Comfort Glide repositioning sheet and wedges to  position patient.    05/03/24 1326    Unscheduled  Wound Care  As Needed      Comments: Zinc with menthol   Question:  Wound Care Instructions  Answer:  Apply Moisture Barrier After Any Incontinence    05/03/24 1326                 u    Discussed findings and treatment plan including risks, benefits, and treatment options with patient in detail. Patient agreed with treatment plan.      This document has been electronically signed by DANIELA Mullins on 5/3/2024 13:25 CDT

## 2024-05-04 LAB
ANION GAP SERPL CALCULATED.3IONS-SCNC: 9 MMOL/L (ref 5–15)
BASOPHILS # BLD AUTO: 0.04 10*3/MM3 (ref 0–0.2)
BASOPHILS NFR BLD AUTO: 0.8 % (ref 0–1.5)
BUN SERPL-MCNC: 32 MG/DL (ref 8–23)
BUN/CREAT SERPL: 17.9 (ref 7–25)
CALCIUM SPEC-SCNC: 8.5 MG/DL (ref 8.6–10.5)
CHLORIDE SERPL-SCNC: 104 MMOL/L (ref 98–107)
CO2 SERPL-SCNC: 26 MMOL/L (ref 22–29)
CREAT SERPL-MCNC: 1.79 MG/DL (ref 0.57–1)
DEPRECATED RDW RBC AUTO: 57.9 FL (ref 37–54)
EGFRCR SERPLBLD CKD-EPI 2021: 30.2 ML/MIN/1.73
EOSINOPHIL # BLD AUTO: 0.13 10*3/MM3 (ref 0–0.4)
EOSINOPHIL NFR BLD AUTO: 2.5 % (ref 0.3–6.2)
ERYTHROCYTE [DISTWIDTH] IN BLOOD BY AUTOMATED COUNT: 16.9 % (ref 12.3–15.4)
GLUCOSE SERPL-MCNC: 90 MG/DL (ref 65–99)
HCT VFR BLD AUTO: 27.8 % (ref 34–46.6)
HGB BLD-MCNC: 8.6 G/DL (ref 12–15.9)
IMM GRANULOCYTES # BLD AUTO: 0.05 10*3/MM3 (ref 0–0.05)
IMM GRANULOCYTES NFR BLD AUTO: 0.9 % (ref 0–0.5)
LYMPHOCYTES # BLD AUTO: 1.35 10*3/MM3 (ref 0.7–3.1)
LYMPHOCYTES NFR BLD AUTO: 25.5 % (ref 19.6–45.3)
MAGNESIUM SERPL-MCNC: 1.8 MG/DL (ref 1.6–2.4)
MCH RBC QN AUTO: 29.6 PG (ref 26.6–33)
MCHC RBC AUTO-ENTMCNC: 30.9 G/DL (ref 31.5–35.7)
MCV RBC AUTO: 95.5 FL (ref 79–97)
MONOCYTES # BLD AUTO: 0.87 10*3/MM3 (ref 0.1–0.9)
MONOCYTES NFR BLD AUTO: 16.4 % (ref 5–12)
NEUTROPHILS NFR BLD AUTO: 2.85 10*3/MM3 (ref 1.7–7)
NEUTROPHILS NFR BLD AUTO: 53.9 % (ref 42.7–76)
NRBC BLD AUTO-RTO: 0.4 /100 WBC (ref 0–0.2)
PLATELET # BLD AUTO: 127 10*3/MM3 (ref 140–450)
PMV BLD AUTO: 11.3 FL (ref 6–12)
POTASSIUM SERPL-SCNC: 3.5 MMOL/L (ref 3.5–5.2)
RBC # BLD AUTO: 2.91 10*6/MM3 (ref 3.77–5.28)
SODIUM SERPL-SCNC: 139 MMOL/L (ref 136–145)
WBC NRBC COR # BLD AUTO: 5.29 10*3/MM3 (ref 3.4–10.8)

## 2024-05-04 PROCEDURE — 85025 COMPLETE CBC W/AUTO DIFF WBC: CPT | Performed by: INTERNAL MEDICINE

## 2024-05-04 PROCEDURE — 80048 BASIC METABOLIC PNL TOTAL CA: CPT | Performed by: INTERNAL MEDICINE

## 2024-05-04 PROCEDURE — 83735 ASSAY OF MAGNESIUM: CPT | Performed by: INTERNAL MEDICINE

## 2024-05-04 PROCEDURE — 63710000001 PREDNISONE PER 5 MG: Performed by: NURSE PRACTITIONER

## 2024-05-04 PROCEDURE — 25010000002 CEFEPIME PER 500 MG: Performed by: NURSE PRACTITIONER

## 2024-05-04 RX ADMIN — Medication 10 ML: at 20:55

## 2024-05-04 RX ADMIN — CARVEDILOL 12.5 MG: 6.25 TABLET, FILM COATED ORAL at 08:51

## 2024-05-04 RX ADMIN — ISOSORBIDE MONONITRATE 60 MG: 60 TABLET, EXTENDED RELEASE ORAL at 08:51

## 2024-05-04 RX ADMIN — ASPIRIN 81 MG: 81 TABLET, COATED ORAL at 08:50

## 2024-05-04 RX ADMIN — CEFEPIME 2000 MG: 2 INJECTION, POWDER, FOR SOLUTION INTRAVENOUS at 09:16

## 2024-05-04 RX ADMIN — CEFEPIME 2000 MG: 2 INJECTION, POWDER, FOR SOLUTION INTRAVENOUS at 20:54

## 2024-05-04 RX ADMIN — APIXABAN 5 MG: 5 TABLET, FILM COATED ORAL at 20:55

## 2024-05-04 RX ADMIN — PREDNISONE 5 MG: 5 TABLET ORAL at 08:50

## 2024-05-04 RX ADMIN — POTASSIUM CHLORIDE 20 MEQ: 1500 TABLET, EXTENDED RELEASE ORAL at 08:50

## 2024-05-04 RX ADMIN — LEFLUNOMIDE 20 MG: 20 TABLET ORAL at 20:55

## 2024-05-04 RX ADMIN — DULOXETINE HYDROCHLORIDE 60 MG: 30 CAPSULE, DELAYED RELEASE ORAL at 08:51

## 2024-05-04 RX ADMIN — FOLIC ACID 1 MG: 1 TABLET ORAL at 08:51

## 2024-05-04 RX ADMIN — CARVEDILOL 12.5 MG: 6.25 TABLET, FILM COATED ORAL at 17:36

## 2024-05-04 RX ADMIN — APIXABAN 5 MG: 5 TABLET, FILM COATED ORAL at 08:51

## 2024-05-04 NOTE — PLAN OF CARE
Goal Outcome Evaluation:  Plan of Care Reviewed With: patient, other (see comments)        Progress: no change  Outcome Evaluation: A&Ox4. No c/o pain IV abx. Turning with wedges. WILNER. Sitter at .

## 2024-05-04 NOTE — PLAN OF CARE
Goal Outcome Evaluation:  Plan of Care Reviewed With: patient, caregiver, family        Progress: improving  Outcome Evaluation: Patient A+Ox4- generalized weakness this morning and falling asleep during conversation and first 2 meals- Caregivers in room and assisting at times. Up to Chair for dinner per wound care orders with waffle cushion. Purewic in place- No BM today. VSS at this time. Antibiotics given as ordered. Up with walker and gait belt from bed to chair. BLE edema noted- small area of bleeding to right upper buttock. Safety maintained.

## 2024-05-04 NOTE — PLAN OF CARE
Goal Outcome Evaluation:  Plan of Care Reviewed With: patient, caregiver        Progress: no change  Outcome Evaluation: Nutrition assessment completed. Pt reports good appetite. Oral intake 62.5% of the past four meals. Boost Original daily with breakfast. Encouraged oral intake. Advised of alternate menu selections and available snacks. Con to follow for plan of care.

## 2024-05-04 NOTE — PROGRESS NOTES
[facility]  INPATIENT PROGRESS NOTE    Patient Name: Tawny Shin  Date of Admission: 5/2/2024  Today's Date: 05/04/24  Length of Stay: 1  Primary Care Physician: Moises Oliveira MD    Subjective   Chief Complaint: f/u weakness, uti    HPI   Patient seen and examined with family at bedside.  Awake alert and reactive.  Feels more better today.  Looks more energetic today.  Family think she is doing better.  She is eating and drinking better today.  Having breakfast.  No nausea or vomiting.  Tolerating p.o.  Bladder scans have been however some 100 without signs of retention.  No fevers.      Review of Systems   All pertinent negatives and positives are as above. All other systems have been reviewed and are negative unless otherwise stated.     Objective    Temp:  [97.6 °F (36.4 °C)-98.7 °F (37.1 °C)] 97.9 °F (36.6 °C)  Heart Rate:  [68-97] 68  Resp:  [16-20] 16  BP: ()/(41-67) 138/51  Physical Exam  GEN: Awake, alert, interactive, in NAD  HEENT:  PERRLA, EOMI, Anicteric, Trachea midline  Lungs:  no wheezing/rales/rhonchi  Heart: RRR, +S1/s2, no rub  ABD: soft, nt, +BS, no guarding/rebound  Extremities: trace to 1+ b/l LE edema, +pedal edema, no rashes  Neuro: AAOx3, no focal deficits  Psych: normal mood & affect        Results Review:  I have reviewed the labs, radiology results, and diagnostic studies.    Laboratory Data:   Results from last 7 days   Lab Units 05/04/24 0518 05/03/24 0517 05/02/24 1905   WBC 10*3/mm3 5.29 7.86 8.03   HEMOGLOBIN g/dL 8.6* 9.5* 9.7*   HEMATOCRIT % 27.8* 31.1* 31.6*   PLATELETS 10*3/mm3 127* 149 168        Results from last 7 days   Lab Units 05/04/24 0518 05/03/24 0518 05/02/24  1905 05/01/24  1344   SODIUM mmol/L 139 140 139 138   POTASSIUM mmol/L 3.5 2.9* 3.5 3.1*   CHLORIDE mmol/L 104 102 99 98   CO2 mmol/L 26.0 26.0 29.0 24.4   BUN mg/dL 32* 26* 29* 26*   CREATININE mg/dL 1.79* 1.62* 1.89* 1.58*   CALCIUM  "mg/dL 8.5* 8.8 9.3 9.3   BILIRUBIN mg/dL  --   --  0.4 0.3   ALK PHOS U/L  --   --  68 76   ALT (SGPT) U/L  --   --  24 24   AST (SGOT) U/L  --   --  26 21   GLUCOSE mg/dL 90 121* 110* 175*       Culture Data:   No results found for: \"BLOODCX\", \"URINECX\", \"WOUNDCX\", \"MRSACX\", \"RESPCX\", \"STOOLCX\"    Radiology Data:   Imaging Results (Last 24 Hours)       ** No results found for the last 24 hours. **            I have reviewed the patient's current medications.     Assessment/Plan   Assessment  Active Hospital Problems    Diagnosis     **Bacteremia due to Enterobacter species     Generalized weakness     History of urinary retention     Stage 3b chronic kidney disease     UTI (urinary tract infection)     Anemia of chronic disease     Chronic anticoagulation        Treatment Plan  #1 UTI - History of dysfunctional bladder.  Previously had a Horan catheter and then using catheters, now urinates on her own.  She currently has a pure wick in place and is making urine.  No retention seen on bladder scan. She has been on cefepime.  Urine culture growing GNR but species is pending.    #2 Enterobacter bacteremia -patient has had 1 of 2 blood cultures come back positive for Enterobacter cloacae complex.  This should be covered by cefepime.  Will await final sensitivities.  Awaiting urine culture as well which has not speciated.  Likely source although she does have a recent gluteal wound/cyst removal. Some clear serous drainage but no purulent drainage. No obvious significant infection there.  Repeat blood culture pending but so far negative.     #3 CKD 3B -creatinine close recent baseline of 1.3-1.6. Function was better in the past. Not enough data points to know if this is acute or progression of disease. Function flat here. Has better po intake today. Avoid hypotension and nephrotoxic meds.  Monitor closely. Holding losartan today.     #4 anemia of chronic disease -H&H stable without signs of bleeding.    #5 hypertension " -blood pressure was soft yesterday after morning meds, coreg cut if half and bp is better, Also going to hold losartan for now this AM, monitor closely.    #6 hypokalemia -3.5 this AM after replacement    #7 generalized weakness -in the setting of underlying chronic illness, recent wound/cyst removal, UTI, bacteremia.  Continue PT OT.  She has caregivers at home.  Discussed today, she is considering potential for STR at d/c. Social workers to see.     Medical Decision Making  Number and Complexity of problems: 3 acute, 1 acute on chronic, multiple chronic  Differential Diagnosis: as above    Conditions and Status        Patient appears to be slightly improved from yesterday, not at goal, non-toxic.      MDM Data  External documents reviewed: none  Cardiac tracing (EKG, telemetry) interpretation: sinus  Radiology interpretation: reports reviewed  Labs reviewed: as above  Any tests that were considered but not ordered: none     Decision rules/scores evaluated (example [id number], Wells, etc): none     Discussed with: patient, family, nursing     Care Planning  Shared decision making: Patient and family apprised of current labs, vitals, imaging and treatment plan.  They are agreeable with proceeding with plans as discussed.    Code status and discussions: full code    Disposition  Social Determinants of Health that impact treatment or disposition: none  I expect the patient to be discharged to home vs rehab in 1-2 days.     Electronically signed by Christos Min DO, 05/04/24, 08:31 CDT.

## 2024-05-05 LAB
ANION GAP SERPL CALCULATED.3IONS-SCNC: 8 MMOL/L (ref 5–15)
BASOPHILS # BLD AUTO: 0.05 10*3/MM3 (ref 0–0.2)
BASOPHILS NFR BLD AUTO: 1.1 % (ref 0–1.5)
BUN SERPL-MCNC: 27 MG/DL (ref 8–23)
BUN/CREAT SERPL: 20.6 (ref 7–25)
CALCIUM SPEC-SCNC: 8.6 MG/DL (ref 8.6–10.5)
CHLORIDE SERPL-SCNC: 109 MMOL/L (ref 98–107)
CO2 SERPL-SCNC: 25 MMOL/L (ref 22–29)
CREAT SERPL-MCNC: 1.31 MG/DL (ref 0.57–1)
DEPRECATED RDW RBC AUTO: 57.8 FL (ref 37–54)
EGFRCR SERPLBLD CKD-EPI 2021: 43.9 ML/MIN/1.73
EOSINOPHIL # BLD AUTO: 0.17 10*3/MM3 (ref 0–0.4)
EOSINOPHIL NFR BLD AUTO: 3.6 % (ref 0.3–6.2)
ERYTHROCYTE [DISTWIDTH] IN BLOOD BY AUTOMATED COUNT: 16.8 % (ref 12.3–15.4)
GLUCOSE SERPL-MCNC: 90 MG/DL (ref 65–99)
HCT VFR BLD AUTO: 28 % (ref 34–46.6)
HGB BLD-MCNC: 8.7 G/DL (ref 12–15.9)
IMM GRANULOCYTES # BLD AUTO: 0.05 10*3/MM3 (ref 0–0.05)
IMM GRANULOCYTES NFR BLD AUTO: 1.1 % (ref 0–0.5)
LYMPHOCYTES # BLD AUTO: 1.51 10*3/MM3 (ref 0.7–3.1)
LYMPHOCYTES NFR BLD AUTO: 32.2 % (ref 19.6–45.3)
MCH RBC QN AUTO: 29.2 PG (ref 26.6–33)
MCHC RBC AUTO-ENTMCNC: 31.1 G/DL (ref 31.5–35.7)
MCV RBC AUTO: 94 FL (ref 79–97)
MONOCYTES # BLD AUTO: 0.66 10*3/MM3 (ref 0.1–0.9)
MONOCYTES NFR BLD AUTO: 14.1 % (ref 5–12)
NEUTROPHILS NFR BLD AUTO: 2.25 10*3/MM3 (ref 1.7–7)
NEUTROPHILS NFR BLD AUTO: 47.9 % (ref 42.7–76)
NRBC BLD AUTO-RTO: 0 /100 WBC (ref 0–0.2)
PLATELET # BLD AUTO: 132 10*3/MM3 (ref 140–450)
PMV BLD AUTO: 11 FL (ref 6–12)
POTASSIUM SERPL-SCNC: 3.6 MMOL/L (ref 3.5–5.2)
RBC # BLD AUTO: 2.98 10*6/MM3 (ref 3.77–5.28)
SODIUM SERPL-SCNC: 142 MMOL/L (ref 136–145)
WBC NRBC COR # BLD AUTO: 4.69 10*3/MM3 (ref 3.4–10.8)

## 2024-05-05 PROCEDURE — 25010000002 CEFEPIME PER 500 MG: Performed by: NURSE PRACTITIONER

## 2024-05-05 PROCEDURE — 99222 1ST HOSP IP/OBS MODERATE 55: CPT | Performed by: INTERNAL MEDICINE

## 2024-05-05 PROCEDURE — 85025 COMPLETE CBC W/AUTO DIFF WBC: CPT | Performed by: INTERNAL MEDICINE

## 2024-05-05 PROCEDURE — 80048 BASIC METABOLIC PNL TOTAL CA: CPT | Performed by: INTERNAL MEDICINE

## 2024-05-05 PROCEDURE — 63710000001 PREDNISONE PER 5 MG: Performed by: NURSE PRACTITIONER

## 2024-05-05 PROCEDURE — 97161 PT EVAL LOW COMPLEX 20 MIN: CPT | Performed by: PHYSICAL THERAPIST

## 2024-05-05 RX ORDER — TRAMADOL HYDROCHLORIDE 50 MG/1
50 TABLET ORAL NIGHTLY PRN
Status: DISCONTINUED | OUTPATIENT
Start: 2024-05-05 | End: 2024-05-09 | Stop reason: HOSPADM

## 2024-05-05 RX ORDER — CALCIUM CARBONATE 500 MG/1
2 TABLET, CHEWABLE ORAL 3 TIMES DAILY PRN
Status: DISCONTINUED | OUTPATIENT
Start: 2024-05-05 | End: 2024-05-09 | Stop reason: HOSPADM

## 2024-05-05 RX ORDER — LEVOTHYROXINE SODIUM 0.07 MG/1
1 TABLET ORAL DAILY
COMMUNITY

## 2024-05-05 RX ORDER — LEVOTHYROXINE SODIUM 0.07 MG/1
75 TABLET ORAL
Status: DISCONTINUED | OUTPATIENT
Start: 2024-05-06 | End: 2024-05-09 | Stop reason: HOSPADM

## 2024-05-05 RX ADMIN — DULOXETINE HYDROCHLORIDE 60 MG: 30 CAPSULE, DELAYED RELEASE ORAL at 09:20

## 2024-05-05 RX ADMIN — FOLIC ACID 1 MG: 1 TABLET ORAL at 09:20

## 2024-05-05 RX ADMIN — APIXABAN 5 MG: 5 TABLET, FILM COATED ORAL at 20:51

## 2024-05-05 RX ADMIN — ANTACID TABLETS 2 TABLET: 500 TABLET, CHEWABLE ORAL at 23:33

## 2024-05-05 RX ADMIN — PREDNISONE 5 MG: 5 TABLET ORAL at 09:20

## 2024-05-05 RX ADMIN — CEFEPIME 2000 MG: 2 INJECTION, POWDER, FOR SOLUTION INTRAVENOUS at 20:51

## 2024-05-05 RX ADMIN — LEFLUNOMIDE 20 MG: 20 TABLET ORAL at 20:51

## 2024-05-05 RX ADMIN — APIXABAN 5 MG: 5 TABLET, FILM COATED ORAL at 09:20

## 2024-05-05 RX ADMIN — Medication 10 ML: at 20:52

## 2024-05-05 RX ADMIN — ISOSORBIDE MONONITRATE 60 MG: 60 TABLET, EXTENDED RELEASE ORAL at 09:20

## 2024-05-05 RX ADMIN — Medication 10 ML: at 09:21

## 2024-05-05 RX ADMIN — ANTACID TABLETS 2 TABLET: 500 TABLET, CHEWABLE ORAL at 10:29

## 2024-05-05 RX ADMIN — TRAMADOL HYDROCHLORIDE 50 MG: 50 TABLET ORAL at 21:55

## 2024-05-05 RX ADMIN — CEFEPIME 2000 MG: 2 INJECTION, POWDER, FOR SOLUTION INTRAVENOUS at 09:19

## 2024-05-05 RX ADMIN — CARVEDILOL 12.5 MG: 6.25 TABLET, FILM COATED ORAL at 17:39

## 2024-05-05 RX ADMIN — ASPIRIN 81 MG: 81 TABLET, COATED ORAL at 09:20

## 2024-05-05 RX ADMIN — POTASSIUM CHLORIDE 20 MEQ: 1500 TABLET, EXTENDED RELEASE ORAL at 09:20

## 2024-05-05 RX ADMIN — CARVEDILOL 12.5 MG: 6.25 TABLET, FILM COATED ORAL at 09:20

## 2024-05-05 NOTE — PLAN OF CARE
Goal Outcome Evaluation:           Progress: no change     Pt alert and oriented x4. VSS. No c/o pain this shift. RAMIREZ. PPP. Eliquis for VTE. , satting at 95% on room air. Tele, NSR. Tolerating cardiac diet. Incision site, CDI. Wound care done per order. Voiding via pure wick. Up x 1 w/ walker. Q2 turns. Last BM 5/4.  Isolation precautions maintained. Bed alarm set. Call light within reach. Safety maintained. Plan of care ongoing. No changes this shift.

## 2024-05-05 NOTE — CONSULTS
INFECTIOUS DISEASES CONSULT NOTE    Patient:  Tawny Shin 68 y.o. female  ROOM # 341/1  YOB: 1955  MRN: [id number]  [facility]:  [id number]  Admit date: 5/2/2024   Admitting Physician: Christos Min DO  Primary Care Physician: Moises Oliveira MD  REFERRING PROVIDER: Joselito Moon MD    Inpatient Infectious Diseases Consult  Consult performed by: Maggie Bang MD  Consult ordered by: Christos Min DO          REASON FOR CONSULTATION : CRE infection      HISTORY OF PRESENT ILLNESS: Mrs. Shin is a pleasant 68-year-old female who has been followed by infectious diseases in the past for a variety of different issues.    She was last seen by Dr. Ohara in the office in February and had been monitored as she had been on chronic suppressive Keflex for previous methicillin susceptible staph coccus or infection involving the right hip.  She had been off the Keflex at the time of that visit for approximately 2 months without any symptomatic return of infection.  Decision was made to leave her off suppressive antibiotic therapy.      Since that time she has been hospitalized April 19 April 23 with urinary tract infection and E. coli bacteremia.  She was treated with intravenous ceftriaxone and transition to oral therapy upon discharge.  Additionally, she underwent cyst excision of the lower buttock per Dr. Keyes.    She came back to the emergency department May 2 with complaints of weakness that worsened over the previous few days and family noticed she had some mental status changes.  She had evidence of urinary tract infection on urinalysis, was started on cefepime and subsequently admitted.  There is also notation of urinary retention at the time of admission per admitting nighttime hospitalist.  Patient since had bladder scanning and had no residuals.    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  Additionally, the  patient has had 1 of 2 blood cultures from admission positive for Enterobacter cloacae complex.  Follow-up blood cultures May 3 negative at almost 48 hours now.    The patient has clinically improved.  Susceptibilities are not available.  The patient was already in isolation given a history of MDR Pseudomonas, MRSA, and carbapenem resistant Pseudomonas          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    Elevated cholesterol     Eustachian tube dysfunction     Heart disease     Herpes simplex     History of transfusion     Hyperlipidemia     Hypertension     Hypothyroidism 12/23/2020    Intrinsic asthma 12/23/2020    Knee dislocation     Labral tear of right hip joint     Laryngitis sicca     Laryngitis, chronic     Left carotid bruit 03/09/2016    MI (myocardial infarction)     Myalgia due to statin 06/25/2019    Open wound of right hip 09/14/2021    Osteomyelitis of right femur 07/06/2021    Otorrhea     Pacemaker 11/17/2016    Primary osteoarthritis of left knee 12/23/2020    Psoriasis vulgaris 12/23/2020    S/P coronary artery stent placement 03/09/2016    Sensorineural hearing loss     Seropositive rheumatoid arthritis of multiple sites 12/27/2019    Overview:  -myochrysine '93-'96 -methotrexate '96--->11/98;r/s  restarted 2/99--> 8/14 (anemia) -sulfasalazine- not effective -penicillamine 6/98-->10/98; no effect -leflunomide 11/98--> - Humira '13-->didn't take - Enbrel 12/14-->3/15- no effect!  "  Last Assessment & Plan:  - \"aching all over\" because she had to be off her anti-rheumatic drugs for 2 weeks in preparation for her R knee surgery - he    Sick sinus syndrome 12/27/2019    Sjogren's disease     Spondylolisthesis of lumbar region 01/17/2018    Syncope, recurrent 02/08/2021    Urinary tract infection    E. coli bacteremia  April, 2024  Methicillin susceptible Staphylococcus aureus bacteremia February, 2021  Methicillin susceptible Staphylococcus aureus right hip infection  Right psoas abscess status post aspiration x 2-secondary to methicillin susceptible Staphylococcus aureus  Cervical stenosis status post decompression  L1-L2 discitis/osteomyelitis debridement and laminectomy          Past Surgical History:   Procedure Laterality Date    A-V CARDIAC PACEMAKER INSERTION  2016    ATRIAL CARDIAC PACEMAKER INSERTION      CARDIAC CATHETERIZATION      CATARACT EXTRACTION      CERVICAL CORPECTOMY N/A 3/3/2021    Procedure: CERVICAL 6 CORPECTOMY WITH TITANIUM CAGE WITH NEURO MONITORING;  Surgeon: Bandar Shea MD;  Location:  [facility] OR;  Service: Neurosurgery;  Laterality: N/A;    COLONOSCOPY  11/08/2011    One fold in the ascending colon which showed ulcer otherwise normal exam    COLONOSCOPY  11/12/2004    Normal exam repeat in five years    CORONARY ANGIOPLASTY WITH STENT PLACEMENT      X 2; 2013 & 2014    ENDOSCOPY  07/10/2014    Normal exam    FLAP LEG Right 9/14/2021    Procedure: RIGHT GLUTEAL FASCIOCUTANEOUS ADVANCEMENT FLAP AND RIGHT TENSOR FASCIAL JESSICA FLAP;  Surgeon: Amadeo Turner MD;  Location:  [facility];  Service: Plastics;  Laterality: Right;    HIP ABDUCTION TENOTOMY BILATERAL Right 1/14/2021    Procedure: RIGHT HIP GLUTEUS MEDLUS / MINIMUS REPAIR, POSSIBLE ACHILLES ALLOGRAFT;  Surgeon: Nino Carlson MD;  Location:  [facility];  Service: Orthopedics;  Laterality: Right;    INCISION AND DRAINAGE ABSCESS Right 6/4/2022    Procedure: INCISION AND DRAINAGE ABSCESS right hip;  Surgeon: " Magda Salcido MD;  Location:  [facility];  Service: General;  Laterality: Right;    INCISION AND DRAINAGE ABSCESS Right 6/10/2022    Procedure: RIGHT HIP INCISION AND DRAINAGE. MD NEEDS 3L VANC IRRIGATION, CURRETTES, DAICANS, KERLEX ROLLS;  Surgeon: Amadeo Turner MD;  Location:  [facility];  Service: Plastics;  Laterality: Right;    INCISION AND DRAINAGE HIP Right 2/9/2021    Procedure: HIP INCISION AND DRAINAGE;  Surgeon: Nino Carlson MD;  Location:  [facility];  Service: Orthopedics;  Laterality: Right;    INCISION AND DRAINAGE LEG Right 10/24/2021    Procedure: INCISION AND DRAINAGE LOWER EXTREMITY;  Surgeon: Amadeo Turner MD;  Location:  [facility];  Service: Plastics;  Laterality: Right;    INCISION AND DRAINAGE OF WOUND Right 7/8/2021    Procedure: INCISION AND DRAINAGE WOUND RIGHT HIP;  Surgeon: James Huntley MD;  Location:  [facility];  Service: Orthopedics;  Laterality: Right;    JOINT REPLACEMENT      KYPHOPLASTY WITH BIOPSY Bilateral 10/26/2021    Procedure: THOARCIC 12 KYPHOPLASTY WITH BIOPSY;  Surgeon: Bandar Shea MD;  Location:  [facility];  Service: Neurosurgery;  Laterality: Bilateral;    LEG DEBRIDEMENT Right 9/14/2021    Procedure: DEBRIDEMENT OF RIGHT HIP WOUND, RIGHT GLUTEAL FASCIOCUTANEOUS ADVANCEMENT FLAP AND RIGHT TENSOR FASCIAL JESSICA FLAP;  Surgeon: Amadeo Turner MD;  Location:  [facility];  Service: Plastics;  Laterality: Right;    LUMBAR DISCECTOMY Right 3/23/2021    Procedure: LUMBAR DISCECTOMY MICRO, Lumbar 1/2 right;  Surgeon: Bandar Shea MD;  Location:  [facility];  Service: Neurosurgery;  Laterality: Right;    LUMBAR FUSION N/A 1/19/2018    Procedure: L3-4,L4-5 DECOMPRESSION, POSTERIOR SPINAL FUSION WITH INSTRUMENTATION;  Surgeon: Fortino Oropeza MD;  Location:  [facility];  Service:     LUMBAR LAMINECTOMY WITH FUSION Left 1/17/2018    Procedure: LEFT L3-4 L4-5 LATERAL LUMBAR INTERBODY FUSION;  Surgeon: Fortino Oropeza MD;  Location:  [facility];   Service:     MASS EXCISION Right 4/23/2024    Procedure: RIGHT BUTTOCK MASS EXCISION;  Surgeon: Moises Keyes MD;  Location: [facility] OR;  Service: General;  Laterality: Right;    MYRINGOTOMY W/ TUBES  09/04/2014    TUBES NO LONGER IN PLACE    OTHER SURGICAL HISTORY      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 so hardware was removed and spacers were placed    REPLACEMENT TOTAL KNEE Right      Family History   Problem Relation Age of Onset    Cancer Mother         Lung cancer    Heart disease Father         Doied of heart. Attack     Social History     Socioeconomic History    Marital status:    Tobacco Use    Smoking status: Never     Passive exposure: Never    Smokeless tobacco: Never   Vaping Use    Vaping status: Never Used   Substance and Sexual Activity    Alcohol use: No    Drug use: Never    Sexual activity: Defer     Birth control/protection: Abstinence       Current Scheduled Medications:   apixaban, 5 mg, Oral, BID  aspirin, 81 mg, Oral, Daily  carvedilol, 12.5 mg, Oral, BID With Meals  cefepime, 2,000 mg, Intravenous, Q12H  DULoxetine, 60 mg, Oral, Daily  folic acid, 1 mg, Oral, Daily  [Held by provider] furosemide, 40 mg, Oral, Daily  isosorbide mononitrate, 60 mg, Oral, Daily  leflunomide, 20 mg, Oral, Nightly  [Held by provider] losartan, 50 mg, Oral, Daily  potassium chloride, 20 mEq, Oral, Daily  predniSONE, 5 mg, Oral, Daily  sodium chloride, 10 mL, Intravenous, Q12H              Current PRN Medications:    acetaminophen **OR** acetaminophen **OR** acetaminophen    senna-docusate sodium **AND** polyethylene glycol **AND** bisacodyl **AND** bisacodyl    calcium carbonate    Diclofenac Sodium    Lidocaine    magnesium hydroxide    nitroglycerin    ondansetron ODT **OR** ondansetron    [COMPLETED] Insert Peripheral IV **AND** sodium chloride    sodium chloride    sodium chloride                Allergies   Allergen Reactions    Atorvastatin Other (See Comments)     LEG CRAMPS      Amoxicillin Rash     "Escitalopram Rash    Nabumetone Rash    Niacin Er Rash    Penicillin G Rash    Penicillins Rash    Simvastatin Rash           Vital Signs:  /70 (BP Location: Right arm, Patient Position: Lying)   Pulse 74   Temp 98.3 °F (36.8 °C) (Oral)   Resp 18   Ht 167.6 cm (66\")   Wt 95.7 kg (211 lb)   LMP  (LMP Unknown)   SpO2 95%   BMI 34.06 kg/m²   Temp (24hrs), Av.2 °F (36.8 °C), Min:97.9 °F (36.6 °C), Max:98.3 °F (36.8 °C)      Physical Exam    General: Patient is well-appearing lying in bed in no acute distress  Respiratory: Effort even and unlabored, she not conversationally dyspneic  Lower extremities: 1+ edema bilaterally  Neuro: She is alert, oriented, was not ambulated but nursing staff reports she was able to take a few steps with her walker to get to the chair yesterday  Psych: Pleasant cooperative    Results Review:    I reviewed the patient's new clinical results.    Lab Results:    CBC:   Lab Results   Lab 24  1344 24  1905 24  1905 24  0517 24  0518 24  0426   WBC 6.84 8.03   < > 7.86 5.29 4.69   HEMOGLOBIN 10.4* 9.7*   < > 9.5* 8.6* 8.7*   HEMATOCRIT 33.0* 31.6*   < > 31.1* 27.8* 28.0*   PLATELETS 200 168  --  149 127* 132*    < > = values in this interval not displayed.        AutoDiff:   Lab Results   Lab 24  1905 24  0518 24  0426   NEUTROPHIL % 57.3 53.9 47.9   LYMPHOCYTE % 26.5 25.5 32.2   MONOCYTES % 13.6* 16.4* 14.1*   EOSINOPHIL % 1.0 2.5 3.6   BASOPHIL % 0.5 0.8 1.1   NEUTROS ABS 4.60 2.85 2.25   LYMPHS ABS 2.13 1.35 1.51   MONOS ABS 1.09* 0.87 0.66   EOS ABS 0.08 0.13 0.17   BASOS ABS 0.04 0.04 0.05        Manual Diff:    Lab Results   Lab 24  1905 24  0518 24  0426   NEUTROS ABS 4.60 2.85 2.25        CMP:   Lab Results   Lab 24  1344 24  1905 24  1905 24  0518 24  0518 24  0426   SODIUM 138 139   < > 140 139 142   POTASSIUM 3.1* 3.5   < > 2.9* 3.5 3.6   CHLORIDE 98 99   < > " 102 104 109*   CO2 24.4 29.0   < > 26.0 26.0 25.0   BUN 26* 29*   < > 26* 32* 27*   CREATININE 1.58* 1.89*   < > 1.62* 1.79* 1.31*   CALCIUM 9.3 9.3   < > 8.8 8.5* 8.6   BILIRUBIN 0.3 0.4  --   --   --   --    ALK PHOS 76 68  --   --   --   --    ALT (SGPT) 24 24  --   --   --   --    AST (SGOT) 21 26  --   --   --   --    GLUCOSE 175* 110*   < > 121* 90 90    < > = values in this interval not displayed.       Lab Results (last 72 hours)       Procedure Component Value Units Date/Time    Urine Culture - Urine, Straight Cath [[id number]]  (Abnormal) Collected: 05/02/24 1956    Specimen: Urine from Straight Cath Updated: 05/05/24 1411     Urine Culture >100,000 CFU/mL Enterobacter cloacae complex CRE     Comment:   Consider infectious disease consult.  Susceptibility results may not correlate to clinical outcomes.  Carbapenem resistant Enterobacteriaceae, patient may be an isolation risk.       Narrative:      Colonization of the urinary tract without infection is common. Treatment is discouraged unless the patient is symptomatic, pregnant, or undergoing an invasive urologic procedure.    Blood Culture - Blood, Hand, Left [[id number]]  (Abnormal) Collected: 05/02/24 2130    Specimen: Blood from Hand, Left Updated: 05/05/24 0603     Blood Culture Enterobacter cloacae complex     Isolated from Pediatric Bottle     Gram Stain Pediatric Bottle Gram negative bacilli    Narrative:      Sensi to follow    Basic Metabolic Panel [[id number]]  (Abnormal) Collected: 05/05/24 0426    Specimen: Blood Updated: 05/05/24 0454     Glucose 90 mg/dL      BUN 27 mg/dL      Creatinine 1.31 mg/dL      Sodium 142 mmol/L      Potassium 3.6 mmol/L      Comment: Slight hemolysis detected by analyzer. Result may be falsely elevated.        Chloride 109 mmol/L      CO2 25.0 mmol/L      Calcium 8.6 mg/dL      BUN/Creatinine Ratio 20.6     Anion Gap 8.0 mmol/L      eGFR 43.9 mL/min/1.73     Narrative:      GFR Normal >60  Chronic Kidney Disease  <60  Kidney Failure <15      CBC & Differential [[id number]]  (Abnormal) Collected: 05/05/24 0426    Specimen: Blood Updated: 05/05/24 0434    Narrative:      The following orders were created for panel order CBC & Differential.  Procedure                               Abnormality         Status                     ---------                               -----------         ------                     CBC Auto Differential[[id number]]        Abnormal            Final result                 Please view results for these tests on the individual orders.    CBC Auto Differential [[id number]]  (Abnormal) Collected: 05/05/24 0426    Specimen: Blood Updated: 05/05/24 0434     WBC 4.69 10*3/mm3      RBC 2.98 10*6/mm3      Hemoglobin 8.7 g/dL      Hematocrit 28.0 %      MCV 94.0 fL      MCH 29.2 pg      MCHC 31.1 g/dL      RDW 16.8 %      RDW-SD 57.8 fl      MPV 11.0 fL      Platelets 132 10*3/mm3      Neutrophil % 47.9 %      Lymphocyte % 32.2 %      Monocyte % 14.1 %      Eosinophil % 3.6 %      Basophil % 1.1 %      Immature Grans % 1.1 %      Neutrophils, Absolute 2.25 10*3/mm3      Lymphocytes, Absolute 1.51 10*3/mm3      Monocytes, Absolute 0.66 10*3/mm3      Eosinophils, Absolute 0.17 10*3/mm3      Basophils, Absolute 0.05 10*3/mm3      Immature Grans, Absolute 0.05 10*3/mm3      nRBC 0.0 /100 WBC     Blood Culture - Blood, Arm, Right [[id number]]  (Normal) Collected: 05/02/24 2130    Specimen: Blood from Arm, Right Updated: 05/04/24 2146     Blood Culture No growth at 2 days    Blood Culture With BREONNA - Blood, Arm, Left [[id number]]  (Normal) Collected: 05/03/24 1738    Specimen: Blood from Arm, Left Updated: 05/04/24 1816     Blood Culture No growth at 24 hours    Magnesium [[id number]]  (Normal) Collected: 05/04/24 0518    Specimen: Blood Updated: 05/04/24 0613     Magnesium 1.8 mg/dL     Basic Metabolic Panel [[id number]]  (Abnormal) Collected: 05/04/24 0518    Specimen: Blood Updated: 05/04/24 0613     Glucose 90 mg/dL       BUN 32 mg/dL      Creatinine 1.79 mg/dL      Sodium 139 mmol/L      Potassium 3.5 mmol/L      Chloride 104 mmol/L      CO2 26.0 mmol/L      Calcium 8.5 mg/dL      BUN/Creatinine Ratio 17.9     Anion Gap 9.0 mmol/L      eGFR 30.2 mL/min/1.73     Narrative:      GFR Normal >60  Chronic Kidney Disease <60  Kidney Failure <15      CBC & Differential [[id number]]  (Abnormal) Collected: 05/04/24 0518    Specimen: Blood Updated: 05/04/24 0548    Narrative:      The following orders were created for panel order CBC & Differential.  Procedure                               Abnormality         Status                     ---------                               -----------         ------                     CBC Auto Differential[[id number]]        Abnormal            Final result                 Please view results for these tests on the individual orders.    CBC Auto Differential [[id number]]  (Abnormal) Collected: 05/04/24 0518    Specimen: Blood Updated: 05/04/24 0548     WBC 5.29 10*3/mm3      RBC 2.91 10*6/mm3      Hemoglobin 8.6 g/dL      Hematocrit 27.8 %      MCV 95.5 fL      MCH 29.6 pg      MCHC 30.9 g/dL      RDW 16.9 %      RDW-SD 57.9 fl      MPV 11.3 fL      Platelets 127 10*3/mm3      Neutrophil % 53.9 %      Lymphocyte % 25.5 %      Monocyte % 16.4 %      Eosinophil % 2.5 %      Basophil % 0.8 %      Immature Grans % 0.9 %      Neutrophils, Absolute 2.85 10*3/mm3      Lymphocytes, Absolute 1.35 10*3/mm3      Monocytes, Absolute 0.87 10*3/mm3      Eosinophils, Absolute 0.13 10*3/mm3      Basophils, Absolute 0.04 10*3/mm3      Immature Grans, Absolute 0.05 10*3/mm3      nRBC 0.4 /100 WBC     Blood Culture ID, PCR - Blood, Hand, Left [[id number]]  (Abnormal) Collected: 05/02/24 2130    Specimen: Blood from Hand, Left Updated: 05/03/24 1229     BCID, PCR Enterobacter cloacae complex. Identification by BCID2 PCR.     BOTTLE TYPE Pediatric Bottle    Narrative:      No resistance genes detected.    Basic Metabolic Panel  [[id number]]  (Abnormal) Collected: 05/03/24 0518    Specimen: Blood Updated: 05/03/24 0603     Glucose 121 mg/dL      BUN 26 mg/dL      Creatinine 1.62 mg/dL      Sodium 140 mmol/L      Potassium 2.9 mmol/L      Chloride 102 mmol/L      CO2 26.0 mmol/L      Calcium 8.8 mg/dL      BUN/Creatinine Ratio 16.0     Anion Gap 12.0 mmol/L      eGFR 34.0 mL/min/1.73     Narrative:      GFR Normal >60  Chronic Kidney Disease <60  Kidney Failure <15      CBC (No Diff) [[id number]]  (Abnormal) Collected: 05/03/24 0517    Specimen: Blood Updated: 05/03/24 0549     WBC 7.86 10*3/mm3      RBC 3.25 10*6/mm3      Hemoglobin 9.5 g/dL      Hematocrit 31.1 %      MCV 95.7 fL      MCH 29.2 pg      MCHC 30.5 g/dL      RDW 16.9 %      RDW-SD 58.6 fl      MPV 11.7 fL      Platelets 149 10*3/mm3     High Sensitivity Troponin T 2Hr [[id number]]  (Abnormal) Collected: 05/02/24 2130    Specimen: Blood Updated: 05/02/24 2204     HS Troponin T 50 ng/L      Troponin T Delta -6 ng/L     Narrative:      High Sensitive Troponin T Reference Range:  <14.0 ng/L- Negative Female for AMI  <22.0 ng/L- Negative Male for AMI  >=14 - Abnormal Female indicating possible myocardial injury.  >=22 - Abnormal Male indicating possible myocardial injury.   Clinicians would have to utilize clinical acumen, EKG, Troponin, and serial changes to determine if it is an Acute Myocardial Infarction or myocardial injury due to an underlying chronic condition.         Urinalysis With Culture If Indicated - Straight Cath [[id number]]  (Abnormal) Collected: 05/02/24 1956    Specimen: Urine from Straight Cath Updated: 05/02/24 2005     Color, UA Yellow     Appearance, UA Turbid     pH, UA 6.0     Specific Gravity, UA 1.025     Glucose, UA Negative     Ketones, UA Negative     Bilirubin, UA Negative     Blood, UA Large (3+)     Protein, UA >=300 mg/dL (3+)     Leuk Esterase, UA Large (3+)     Nitrite, UA Positive     Urobilinogen, UA 0.2 E.U./dL    Narrative:      In absence of  clinical symptoms, the presence of pyuria, bacteria, and/or nitrites on the urinalysis result does not correlate with infection.    Urinalysis, Microscopic Only - Straight Cath [[id number]]  (Abnormal) Collected: 05/02/24 1956    Specimen: Urine from Straight Cath Updated: 05/02/24 2005     RBC, UA 21-50 /HPF      WBC, UA Too Numerous to Count /HPF      Bacteria, UA 3+ /HPF      Squamous Epithelial Cells, UA None Seen /HPF      Methodology Manual Light Microscopy    High Sensitivity Troponin T [[id number]]  (Abnormal) Collected: 05/02/24 1905    Specimen: Blood Updated: 05/02/24 1943     HS Troponin T 56 ng/L     Narrative:      High Sensitive Troponin T Reference Range:  <14.0 ng/L- Negative Female for AMI  <22.0 ng/L- Negative Male for AMI  >=14 - Abnormal Female indicating possible myocardial injury.  >=22 - Abnormal Male indicating possible myocardial injury.   Clinicians would have to utilize clinical acumen, EKG, Troponin, and serial changes to determine if it is an Acute Myocardial Infarction or myocardial injury due to an underlying chronic condition.         Comprehensive Metabolic Panel [[id number]]  (Abnormal) Collected: 05/02/24 1905    Specimen: Blood Updated: 05/02/24 1938     Glucose 110 mg/dL      BUN 29 mg/dL      Creatinine 1.89 mg/dL      Sodium 139 mmol/L      Potassium 3.5 mmol/L      Comment: Slight hemolysis detected by analyzer. Result may be falsely elevated.        Chloride 99 mmol/L      CO2 29.0 mmol/L      Calcium 9.3 mg/dL      Total Protein 6.9 g/dL      Albumin 3.3 g/dL      ALT (SGPT) 24 U/L      AST (SGOT) 26 U/L      Comment: Slight hemolysis detected by analyzer. Result may be falsely elevated.        Alkaline Phosphatase 68 U/L      Total Bilirubin 0.4 mg/dL      Globulin 3.6 gm/dL      A/G Ratio 0.9 g/dL      BUN/Creatinine Ratio 15.3     Anion Gap 11.0 mmol/L      eGFR 28.3 mL/min/1.73     Narrative:      GFR Normal >60  Chronic Kidney Disease <60  Kidney Failure <15      Lactic  Acid, Plasma [[id number]]  (Normal) Collected: 05/02/24 1905    Specimen: Blood Updated: 05/02/24 1933     Lactate 0.9 mmol/L     Magnesium [[id number]]  (Normal) Collected: 05/02/24 1905    Specimen: Blood Updated: 05/02/24 1933     Magnesium 1.8 mg/dL     BNP [[id number]]  (Abnormal) Collected: 05/02/24 1905    Specimen: Blood Updated: 05/02/24 1932     proBNP 1,900.0 pg/mL     Narrative:      This assay is used as an aid in the diagnosis of individuals suspected of having heart failure. It can be used as an aid in the diagnosis of acute decompensated heart failure (ADHF) in patients presenting with signs and symptoms of ADHF to the emergency department (ED). In addition, NT-proBNP of <300 pg/mL indicates ADHF is not likely.    Age Range Result Interpretation  NT-proBNP Concentration (pg/mL:      <50             Positive            >450                   Gray                 300-450                    Negative             <300    50-75           Positive            >900                  Gray                300-900                  Negative            <300      >75             Positive            >1800                  Gray                300-1800                  Negative            <300    D-dimer, Quantitative [[id number]]  (Abnormal) Collected: 05/02/24 1905    Specimen: Blood Updated: 05/02/24 1929     D-Dimer, Quantitative 1.25 MCGFEU/mL     Narrative:      According to the assay 's published package insert, a normal (<0.50 MCGFEU/mL) D-dimer result in conjunction with a non-high clinical probability assessment, excludes deep vein thrombosis (DVT) and pulmonary embolism (PE) with high sensitivity.    D-dimer values increase with age and this can make VTE exclusion of an older population difficult. To address this, the [employer], based on best available evidence and recent guidelines, recommends that clinicians use age-adjusted D-dimer thresholds in patients greater than 50 years  "of age with: a) a low probability of PE who do not meet all Pulmonary Embolism Rule Out Criteria, or b) in those with intermediate probability of PE.   The formula for an age-adjusted D-dimer cut-off is \"age/100\".  For example, a 60 year old patient would have an age-adjusted cut-off of 0.60 MCGFEU/mL and an 80 year old 0.80 MCGFEU/mL.    Protime-INR [[id number]]  (Abnormal) Collected: 05/02/24 1905    Specimen: Blood Updated: 05/02/24 1929     Protime 19.3 Seconds      INR 1.56    aPTT [[id number]]  (Abnormal) Collected: 05/02/24 1905    Specimen: Blood Updated: 05/02/24 1929     PTT 68.0 seconds     CBC & Differential [[id number]]  (Abnormal) Collected: 05/02/24 1905    Specimen: Blood Updated: 05/02/24 1923    Narrative:      The following orders were created for panel order CBC & Differential.  Procedure                               Abnormality         Status                     ---------                               -----------         ------                     CBC Auto Differential[[id number]]        Abnormal            Final result                 Please view results for these tests on the individual orders.    CBC Auto Differential [[id number]]  (Abnormal) Collected: 05/02/24 1905    Specimen: Blood Updated: 05/02/24 1923     WBC 8.03 10*3/mm3      RBC 3.28 10*6/mm3      Hemoglobin 9.7 g/dL      Hematocrit 31.6 %      MCV 96.3 fL      MCH 29.6 pg      MCHC 30.7 g/dL      RDW 17.0 %      RDW-SD 58.6 fl      MPV 11.6 fL      Platelets 168 10*3/mm3      Neutrophil % 57.3 %      Lymphocyte % 26.5 %      Monocyte % 13.6 %      Eosinophil % 1.0 %      Basophil % 0.5 %      Immature Grans % 1.1 %      Neutrophils, Absolute 4.60 10*3/mm3      Lymphocytes, Absolute 2.13 10*3/mm3      Monocytes, Absolute 1.09 10*3/mm3      Eosinophils, Absolute 0.08 10*3/mm3      Basophils, Absolute 0.04 10*3/mm3      Immature Grans, Absolute 0.09 10*3/mm3      nRBC 0.4 /100 WBC     COVID-19, FLU A/B, RSV PCR 1 HR TAT - Swab, " Nasopharynx [[id number]]  (Normal) Collected: 05/02/24 1826    Specimen: Swab from Nasopharynx Updated: 05/02/24 1910     COVID19 Not Detected     Influenza A PCR Not Detected     Influenza B PCR Not Detected     RSV, PCR Not Detected    Narrative:      Fact sheet for providers: https://www.fda.gov/media/852542/download    Fact sheet for patients: https://www.fda.gov/media/695232/download    Test performed by PCR.            Estimated Creatinine Clearance: 46.6 mL/min (A) (by C-G formula based on SCr of 1.31 mg/dL (H)).    Culture Results:    Microbiology Results (last 10 days)       Procedure Component Value - Date/Time    Blood Culture With BREONNA - Blood, Arm, Left [[id number]]  (Normal) Collected: 05/03/24 1738    Lab Status: Preliminary result Specimen: Blood from Arm, Left Updated: 05/04/24 1816     Blood Culture No growth at 24 hours    Blood Culture - Blood, Hand, Left [[id number]]  (Abnormal) Collected: 05/02/24 2130    Lab Status: Preliminary result Specimen: Blood from Hand, Left Updated: 05/05/24 0603     Blood Culture Enterobacter cloacae complex     Isolated from Pediatric Bottle     Gram Stain Pediatric Bottle Gram negative bacilli    Narrative:      Sensi to follow    Blood Culture - Blood, Arm, Right [[id number]]  (Normal) Collected: 05/02/24 2130    Lab Status: Preliminary result Specimen: Blood from Arm, Right Updated: 05/04/24 2146     Blood Culture No growth at 2 days    Blood Culture ID, PCR - Blood, Hand, Left [[id number]]  (Abnormal) Collected: 05/02/24 2130    Lab Status: Final result Specimen: Blood from Hand, Left Updated: 05/03/24 1229     BCID, PCR Enterobacter cloacae complex. Identification by BCID2 PCR.     BOTTLE TYPE Pediatric Bottle    Narrative:      No resistance genes detected.    Urine Culture - Urine, Straight Cath [[id number]]  (Abnormal) Collected: 05/02/24 1956    Lab Status: Preliminary result Specimen: Urine from Straight Cath Updated: 05/05/24 1411     Urine Culture >100,000  CFU/mL Enterobacter cloacae complex CRE     Comment:   Consider infectious disease consult.  Susceptibility results may not correlate to clinical outcomes.  Carbapenem resistant Enterobacteriaceae, patient may be an isolation risk.       Narrative:      Colonization of the urinary tract without infection is common. Treatment is discouraged unless the patient is symptomatic, pregnant, or undergoing an invasive urologic procedure.    COVID-19, FLU A/B, RSV PCR 1 HR TAT - Swab, Nasopharynx [[id number]]  (Normal) Collected: 05/02/24 1826    Lab Status: Final result Specimen: Swab from Nasopharynx Updated: 05/02/24 1910     COVID19 Not Detected     Influenza A PCR Not Detected     Influenza B PCR Not Detected     RSV, PCR Not Detected    Narrative:      Fact sheet for providers: https://www.fda.gov/media/165906/download    Fact sheet for patients: https://www.fda.gov/media/997543/download    Test performed by PCR.               Radiology:   Imaging Results (Last 72 Hours)       Procedure Component Value Units Date/Time    XR Chest 1 View [[id number]] Collected: 05/02/24 1806     Updated: 05/02/24 1810    Narrative:      EXAM: XR CHEST 1 VW- 5/2/2024 4:55 PM     HISTORY: Weakness       COMPARISON: 4/19/2024.     TECHNIQUE: Single frontal radiograph of the chest was obtained.     FINDINGS:     Support Devices: Cardiac pacemaker device with leads overlying the right  atrium and right ventricle.     Cardiac and Mediastinal Silhouettes: Normal.     Lungs/Pleura: No focal consolidation. No sizable pleural effusion. No  visible pneumothorax.     Osseous structures: No acute osseous finding.     Other: None.       Impression:         No acute cardiopulmonary abnormality.           This report was signed and finalized on 5/2/2024 6:07 PM by Marco Tovar.                 HOSPITAL PROBLEM LIST:      Bacteremia due to Enterobacter species    Chronic anticoagulation    Anemia of chronic disease    UTI (urinary tract infection)     Stage 3b chronic kidney disease    Generalized weakness    History of urinary retention      IMPRESSION:  Enterobacter cloacae bacteremia-LICHA pending-secondary to urinary tract infection  Urinary tract infection with Enterobacter cloacae-CRE reported today.  Recent admission with E. coli bacteremia  April 19-this was reported secondary to urinary tract infection however urine culture that date revealed mixed brett  Mental status changes on admission-improved/resolved  Chronic kidney disease stage IIIb-with acute kidney injury-improved  Thrombocytopenia-could potentially be related to cefepime  History of prolonged MSSA bacteremia      RECOMMENDATION:   Continue cefepime  Follow-up with microbiology and level tomorrow regarding CRE.  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  It is possible that the carbapenem resistance gene has been detected but not being expressed as the patient is improving on a beta-lactam.  This would seem more likely than some other intrinsic resistance of the bacteria but there are other possibilities as well involving carbapenem resistance.  Agree with continuing on cefepime since patient has clinically responded.  Monitor platelets        Discussed with Dr. Mechelle Bang MD  05/05/24  17:01 CDT

## 2024-05-05 NOTE — PLAN OF CARE
Goal Outcome Evaluation:  Plan of Care Reviewed With: patient        Progress: improving  Outcome Evaluation: Patient A/O x 4. VSS. On room air. NSR on telemetry. No PRNs requested.  Tolerating IV ABT. Caregiver at bedside throughout the night. Was able to sleep well last night. No new concerns. Safety maintained.

## 2024-05-05 NOTE — PROGRESS NOTES
[facility] Medicine Services  INPATIENT PROGRESS NOTE    Patient Name: Tawny Shin  Date of Admission: 5/2/2024  Today's Date: 05/05/24  Length of Stay: 2  Primary Care Physician: Moises Oliveira MD    Subjective   Chief Complaint: f/u weakness, uti    HPI   Patient seen resting comfortably with family at bedside.  Feels well.  Continues to do well/better.  No fevers noted.  Mental status is good.  Discussed today potential again for home with prior versus rehab.  Patient states she would rather go home.      Review of Systems   All pertinent negatives and positives are as above. All other systems have been reviewed and are negative unless otherwise stated.     Objective    Temp:  [97.9 °F (36.6 °C)-98.3 °F (36.8 °C)] 98.2 °F (36.8 °C)  Heart Rate:  [72-80] 73  Resp:  [16-19] 18  BP: (113-154)/(50-96) 117/96  Physical Exam  GEN: Awake, alert, interactive, in NAD  HEENT:  PERRLA, EOMI, Anicteric, Trachea midline  Lungs:  no wheezing/rales/rhonchi  Heart: RRR, +S1/s2, no rub  ABD: soft, nt, +BS, no guarding/rebound  Extremities: trace to 1+ b/l LE edema, +pedal edema, no rashes  Neuro: AAOx3, no focal deficits  Psych: normal mood & affect        Results Review:  I have reviewed the labs, radiology results, and diagnostic studies.    Laboratory Data:   Results from last 7 days   Lab Units 05/05/24 0426 05/04/24 0518 05/03/24  0517   WBC 10*3/mm3 4.69 5.29 7.86   HEMOGLOBIN g/dL 8.7* 8.6* 9.5*   HEMATOCRIT % 28.0* 27.8* 31.1*   PLATELETS 10*3/mm3 132* 127* 149        Results from last 7 days   Lab Units 05/05/24  0426 05/04/24  0518 05/03/24 0518 05/02/24  1905 05/02/24  1905 05/01/24  1344   SODIUM mmol/L 142 139 140   < > 139 138   POTASSIUM mmol/L 3.6 3.5 2.9*   < > 3.5 3.1*   CHLORIDE mmol/L 109* 104 102   < > 99 98   CO2 mmol/L 25.0 26.0 26.0   < > 29.0 24.4   BUN mg/dL 27* 32* 26*   < > 29* 26*   CREATININE mg/dL 1.31* 1.79* 1.62*   < > 1.89* 1.58*   CALCIUM mg/dL  "8.6 8.5* 8.8   < > 9.3 9.3   BILIRUBIN mg/dL  --   --   --   --  0.4 0.3   ALK PHOS U/L  --   --   --   --  68 76   ALT (SGPT) U/L  --   --   --   --  24 24   AST (SGOT) U/L  --   --   --   --  26 21   GLUCOSE mg/dL 90 90 121*   < > 110* 175*    < > = values in this interval not displayed.       Culture Data:   No results found for: \"BLOODCX\", \"URINECX\", \"WOUNDCX\", \"MRSACX\", \"RESPCX\", \"STOOLCX\"    Radiology Data:   Imaging Results (Last 24 Hours)       ** No results found for the last 24 hours. **            I have reviewed the patient's current medications.     Assessment/Plan   Assessment  Active Hospital Problems    Diagnosis     **Bacteremia due to Enterobacter species     Generalized weakness     History of urinary retention     Stage 3b chronic kidney disease     UTI (urinary tract infection)     Anemia of chronic disease     Chronic anticoagulation        Treatment Plan  #1 UTI - History of dysfunctional bladder.  Previously had a Horan catheter and then using catheters, now urinates on her own.  She currently has a pure wick in place and is making urine.  No retention seen on bladder scan. She has been on cefepime.  Urine culture growing GNR but species is pending.    #2 Enterobacter bacteremia -patient has had 1 of 2 blood cultures come back positive for Enterobacter cloacae complex.  This should be covered by cefepime.  Will await final sensitivities.  Awaiting urine culture as well which has not speciated.  Likely source although she does have a recent gluteal wound/cyst removal. Some clear serous drainage but no purulent drainage. No obvious significant infection there.  Repeat blood culture pending but so far negative.     #3 CKD 3B -creatinine now back to baseline which appears to be around 1.3-1.6. Has better po intake last two days. Avoid hypotension and nephrotoxic meds.  Monitor closely. Holding losartan today. Resume at half dose potentially if bp elevates.     #4 anemia of chronic disease -H&H " stable without signs of bleeding.    #5 hypertension -blood pressure was soft on 5/3 after morning meds, coreg cut if half and losartan held. Bp is better    #6 hypokalemia -3.6 this AM    #7 generalized weakness -in the setting of underlying chronic illness, recent wound/cyst removal, UTI, bacteremia.  Continue PT OT.  She has caregivers at home.  Discussed today, she has decided on home w/ HHS instead of STR.    Medical Decision Making  Number and Complexity of problems: 3 acute, 1 acute on chronic, multiple chronic  Differential Diagnosis: as above    Conditions and Status        Patient appears to be slightly improved from yesterday, not at goal, non-toxic.      MDM Data  External documents reviewed: none  Cardiac tracing (EKG, telemetry) interpretation: sinus  Radiology interpretation: reports reviewed  Labs reviewed: as above  Any tests that were considered but not ordered: none     Decision rules/scores evaluated (example [id number], Wells, etc): none     Discussed with: patient, family, nursing     Care Planning  Shared decision making: Patient and family apprised of current labs, vitals, imaging and treatment plan.  They are agreeable with proceeding with plans as discussed.    Code status and discussions: full code    Disposition  Social Determinants of Health that impact treatment or disposition: none  I expect the patient to be discharged to home vs rehab in 1-2 days.     Electronically signed by Christos Min DO, 05/05/24, 10:10 CDT.    ADDENDUM:  Blood culture sensitivities are still not yet back.  Her urine has now finally returned and shows CRE Enterobacter.  Clinically patient has been stable here on cefepime without worsening or fevers.  Called micro lab to try to get update on blood culture sensitivities but none available.  Will consult ID for input.  Case discussed with Dr. Bang.    Electronically signed by Christos Min DO, 05/05/24, 4:15 PM CDT.

## 2024-05-05 NOTE — THERAPY EVALUATION
Patient Name: Tawny Shin  : 1955    MRN: [id number]                              Today's Date: 2024       Admit Date: 2024    Visit Dx:     ICD-10-CM ICD-9-CM   1. Weakness  R53.1 780.79   2. Urinary tract infection without hematuria, site unspecified  N39.0 599.0   3. Dysphagia, unspecified type [R13.10]  R13.10 787.20     Patient Active Problem List   Diagnosis    T12 compression fracture, initial encounter    Chronic embolism and thrombosis of unspecified deep veins of left lower extremity    Chronic anticoagulation    Iron deficiency anemia    Osteoporosis    E coli bacteremia    Epidural hematoma    Pleural effusion, left    Functional neurological symptom disorder with weakness or paralysis    Overweight (BMI 25.0-29.9)    Rheumatoid arthritis involving multiple sites    (HFpEF) heart failure with preserved ejection fraction    Venous insufficiency    Coronary artery disease involving native coronary artery of native heart without angina pectoris    Sick sinus syndrome    Essential hypertension    Pressure injury of skin of heel    Chronic pain    Anemia of chronic disease    Cholelithiasis    Pressure injury of skin of buttock    Near functional paraplegia    Skin cancer    Squamous cell carcinoma of back    Hematoma    Right-sided chest wall pain    Hyperlipidemia LDL goal <70    Malodorous urine    UTI (urinary tract infection)    Stage 3b chronic kidney disease    Cyst of buttocks    Sepsis due to Escherichia coli without acute organ dysfunction    Generalized weakness    Paralysis    History of urinary retention    Bacteremia due to Enterobacter species    Bacteremia     Past Medical History:   Diagnosis Date    Age-related osteoporosis with current pathological fracture 2020    Arthritis     Asthma     Bilateral bunions 2020    Cancer     Cardiac pacemaker syndrome 2020    Overview:  - heart block - implanted     Charcot's joint of foot, left 2020     "Chronic deep vein thrombosis (DVT) of right lower extremity 06/23/2021    Chronic pain syndrome 06/22/2021    Chronic sinusitis     COPD (chronic obstructive pulmonary disease)     Coronary artery disease     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    Elevated cholesterol     Eustachian tube dysfunction     Heart disease     Herpes simplex     History of transfusion     Hyperlipidemia     Hypertension     Hypothyroidism 12/23/2020    Intrinsic asthma 12/23/2020    Knee dislocation     Labral tear of right hip joint     Laryngitis sicca     Laryngitis, chronic     Left carotid bruit 03/09/2016    MI (myocardial infarction)     Myalgia due to statin 06/25/2019    Open wound of right hip 09/14/2021    Osteomyelitis of right femur 07/06/2021    Otorrhea     Pacemaker 11/17/2016    Primary osteoarthritis of left knee 12/23/2020    Psoriasis vulgaris 12/23/2020    S/P coronary artery stent placement 03/09/2016    Sensorineural hearing loss     Seropositive rheumatoid arthritis of multiple sites 12/27/2019    Overview:  -myochrysine '93-'96 -methotrexate '96--->11/98;r/s  restarted 2/99--> 8/14 (anemia) -sulfasalazine- not effective -penicillamine 6/98-->10/98; no effect -leflunomide 11/98--> - Humira '13-->didn't take - Enbrel 12/14-->3/15- no effect!   Last Assessment & Plan:  - \"aching all over\" because she had to be off her anti-rheumatic drugs for 2 weeks in preparation for her R knee surgery - he    Sick sinus syndrome 12/27/2019    Sjogren's disease     Spondylolisthesis of lumbar region 01/17/2018    Syncope, recurrent 02/08/2021    Urinary tract infection      Past Surgical History:   Procedure Laterality Date    A-V CARDIAC PACEMAKER INSERTION  2016    ATRIAL CARDIAC PACEMAKER INSERTION      CARDIAC CATHETERIZATION      CATARACT EXTRACTION      CERVICAL CORPECTOMY N/A 3/3/2021    Procedure: CERVICAL 6 CORPECTOMY WITH TITANIUM CAGE WITH NEURO MONITORING;  Surgeon: Bandar Shea MD;  Location: [facility]" OR;  Service: Neurosurgery;  Laterality: N/A;    COLONOSCOPY  11/08/2011    One fold in the ascending colon which showed ulcer otherwise normal exam    COLONOSCOPY  11/12/2004    Normal exam repeat in five years    CORONARY ANGIOPLASTY WITH STENT PLACEMENT      X 2; 2013 & 2014    ENDOSCOPY  07/10/2014    Normal exam    FLAP LEG Right 9/14/2021    Procedure: RIGHT GLUTEAL FASCIOCUTANEOUS ADVANCEMENT FLAP AND RIGHT TENSOR FASCIAL JESSICA FLAP;  Surgeon: Amadeo Turner MD;  Location:  [facility] OR;  Service: Plastics;  Laterality: Right;    HIP ABDUCTION TENOTOMY BILATERAL Right 1/14/2021    Procedure: RIGHT HIP GLUTEUS MEDLUS / MINIMUS REPAIR, POSSIBLE ACHILLES ALLOGRAFT;  Surgeon: Nino Carlson MD;  Location:  [facility] OR;  Service: Orthopedics;  Laterality: Right;    INCISION AND DRAINAGE ABSCESS Right 6/4/2022    Procedure: INCISION AND DRAINAGE ABSCESS right hip;  Surgeon: Magda Salcido MD;  Location:  [facility] OR;  Service: General;  Laterality: Right;    INCISION AND DRAINAGE ABSCESS Right 6/10/2022    Procedure: RIGHT HIP INCISION AND DRAINAGE. MD NEEDS 3L VANC IRRIGATION, CURRETTES, DAICANS, KERLEX ROLLS;  Surgeon: Amadeo Turner MD;  Location:  [facility] OR;  Service: Plastics;  Laterality: Right;    INCISION AND DRAINAGE HIP Right 2/9/2021    Procedure: HIP INCISION AND DRAINAGE;  Surgeon: Nino Carlson MD;  Location:  [facility] OR;  Service: Orthopedics;  Laterality: Right;    INCISION AND DRAINAGE LEG Right 10/24/2021    Procedure: INCISION AND DRAINAGE LOWER EXTREMITY;  Surgeon: Amadeo Turner MD;  Location:  [facility] OR;  Service: Plastics;  Laterality: Right;    INCISION AND DRAINAGE OF WOUND Right 7/8/2021    Procedure: INCISION AND DRAINAGE WOUND RIGHT HIP;  Surgeon: James Huntley MD;  Location:  [facility] OR;  Service: Orthopedics;  Laterality: Right;    JOINT REPLACEMENT      KYPHOPLASTY WITH BIOPSY Bilateral 10/26/2021    Procedure: THOARCIC 12 KYPHOPLASTY WITH BIOPSY;  Surgeon: Bandar Shea,  MD;  Location:  [facility] OR;  Service: Neurosurgery;  Laterality: Bilateral;    LEG DEBRIDEMENT Right 9/14/2021    Procedure: DEBRIDEMENT OF RIGHT HIP WOUND, RIGHT GLUTEAL FASCIOCUTANEOUS ADVANCEMENT FLAP AND RIGHT TENSOR FASCIAL JESSICA FLAP;  Surgeon: Amadeo Turner MD;  Location:  [facility] OR;  Service: Plastics;  Laterality: Right;    LUMBAR DISCECTOMY Right 3/23/2021    Procedure: LUMBAR DISCECTOMY MICRO, Lumbar 1/2 right;  Surgeon: Bandar Shea MD;  Location:  [facility] OR;  Service: Neurosurgery;  Laterality: Right;    LUMBAR FUSION N/A 1/19/2018    Procedure: L3-4,L4-5 DECOMPRESSION, POSTERIOR SPINAL FUSION WITH INSTRUMENTATION;  Surgeon: Fortino Oropeza MD;  Location:  [facility] OR;  Service:     LUMBAR LAMINECTOMY WITH FUSION Left 1/17/2018    Procedure: LEFT L3-4 L4-5 LATERAL LUMBAR INTERBODY FUSION;  Surgeon: Fortino Oropeza MD;  Location:  [facility] OR;  Service:     MASS EXCISION Right 4/23/2024    Procedure: RIGHT BUTTOCK MASS EXCISION;  Surgeon: Moises Keyes MD;  Location:  [facility] OR;  Service: General;  Laterality: Right;    MYRINGOTOMY W/ TUBES  09/04/2014    TUBES NO LONGER IN PLACE    OTHER SURGICAL HISTORY      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 so hardware was removed and spacers were placed    REPLACEMENT TOTAL KNEE Right       General Information       Row Name 05/05/24 0810          Physical Therapy Time and Intention    Document Type evaluation  bacteremia, UTI, generalized weakness  -MS     Mode of Treatment physical therapy;individual therapy  -MS       Row Name 05/05/24 0810          General Information    Patient Profile Reviewed yes  -MS     Prior Level of Function min assist:;bed mobility;transfer;independent:;w/c or scooter  -MS     Existing Precautions/Restrictions fall  -MS     Barriers to Rehab medically complex;previous functional deficit  -MS       Row Name 05/05/24 0810          Living Environment    People in Home alone  caregivers 24/7  -MS       Row Name 05/05/24 0810           Home Main Entrance    Number of Stairs, Main Entrance none  -MS       Row Name 05/05/24 0810          Stairs Within Home, Primary    Number of Stairs, Within Home, Primary none  -MS       Row Name 05/05/24 0810          Cognition    Orientation Status (Cognition) oriented x 4  -MS       Row Name 05/05/24 0810          Safety Issues, Functional Mobility    Safety Issues Affecting Function (Mobility) friction/shear risk  -MS     Impairments Affecting Function (Mobility) balance;endurance/activity tolerance;sensation/sensory awareness;strength  -MS               User Key  (r) = Recorded By, (t) = Taken By, (c) = Cosigned By      Initials Name Provider Type    Nicole Palacios, PT, DPT, NCS Physical Therapist                   Mobility       Row Name 05/05/24 0810          Bed Mobility    Bed Mobility supine-sit  -MS     Supine-Sit [place] (Bed Mobility) minimum assist (75% patient effort);verbal cues  -MS     Assistive Device (Bed Mobility) bed rails;head of bed elevated  -MS       Row Name 05/05/24 0810          Bed-Chair Transfer    Bed-Chair [place] (Transfers) contact guard  -MS     Assistive Device (Bed-Chair Transfers) walker, front-wheeled  -MS     Comment, (Bed-Chair Transfer) pt able to take steps bed to chair  -MS       Row Name 05/05/24 0810          Sit-Stand Transfer    Sit-Stand [place] (Transfers) minimum assist (75% patient effort);verbal cues  -MS     Assistive Device (Sit-Stand Transfers) walker, front-wheeled  -MS               User Key  (r) = Recorded By, (t) = Taken By, (c) = Cosigned By      Initials Name Provider Type    Nicole Palacios, PT, DPT, NCS Physical Therapist                   Obj/Interventions       Row Name 05/05/24 0810          Range of Motion Comprehensive    Comment, General Range of Motion R hip flexion impaired 25%  -MS       Row Name 05/05/24 0810          Strength Comprehensive (MMT)    Comment, General Manual Muscle Testing (MMT) Assessment R hip flexion  4-/5, R quad 4/5, R hamstring 4/5, R dorsiflexion 4/5. L LE 5/5  -MS       Row Name 05/05/24 0810          Balance    Balance Assessment sitting static balance;sitting dynamic balance;standing static balance;standing dynamic balance  -MS     Static Sitting Balance independent  -MS     Dynamic Sitting Balance independent  -MS     Position, Sitting Balance supported;sitting edge of bed  -MS     Static Standing Balance contact guard  -MS     Dynamic Standing Balance contact guard  -MS     Position/Device Used, Standing Balance walker, rolling  -MS       Row Name 05/05/24 0810          Sensory Assessment (Somatosensory)    Sensory Assessment (Somatosensory) sensation intact  -MS               User Key  (r) = Recorded By, (t) = Taken By, (c) = Cosigned By      Initials Name Provider Type    Nicole Palacios, PT, DPT, NCS Physical Therapist                   Goals/Plan       Row Name 05/05/24 0810          Bed Mobility Goal 1 (PT)    Activity/Assistive Device (Bed Mobility Goal 1, PT) bed mobility activities, all  -MS     [place] Level/Cues Needed (Bed Mobility Goal 1, PT) independent  -MS     Time Frame (Bed Mobility Goal 1, PT) long term goal (LTG);by discharge  -MS     Progress/Outcomes (Bed Mobility Goal 1, PT) new goal  -MS       Row Name 05/05/24 0810          Transfer Goal 1 (PT)    Activity/Assistive Device (Transfer Goal 1, PT) sit-to-stand/stand-to-sit;bed-to-chair/chair-to-bed;walker, rolling  -MS     [place] Level/Cues Needed (Transfer Goal 1, PT) modified independence  -MS     Time Frame (Transfer Goal 1, PT) long term goal (LTG);by discharge  -MS     Progress/Outcome (Transfer Goal 1, PT) new goal  -MS       Row Name 05/05/24 0810          Gait Training Goal 1 (PT)    Activity/Assistive Device (Gait Training Goal 1, PT) gait (walking locomotion);assistive device use;decrease fall risk;improve balance and speed;increase endurance/gait distance;walker, rolling  -MS     [place] Level (Gait  Training Goal 1, PT) modified independence  -MS     Distance (Gait Training Goal 1, PT) 10ft  -MS     Time Frame (Gait Training Goal 1, PT) long term goal (LTG);by discharge  -MS     Progress/Outcome (Gait Training Goal 1, PT) new goal  -MS       Row Name 05/05/24 0810          Therapy Assessment/Plan (PT)    Planned Therapy Interventions (PT) balance training;bed mobility training;gait training;patient/family education;strengthening;transfer training  -MS               User Key  (r) = Recorded By, (t) = Taken By, (c) = Cosigned By      Initials Name Provider Type    MS Nicole Olson R, PT, DPT, NCS Physical Therapist                   Clinical Impression       Row Name 05/05/24 0810          Pain    Pretreatment Pain Rating 0/10 - no pain  -MS     Posttreatment Pain Rating 0/10 - no pain  -MS       Row Name 05/05/24 0810          Plan of Care Review    Plan of Care Reviewed With patient  -MS     Progress improving  -MS     Outcome Evaluation The patient presents alert and oriented x4 lying in bed. She demonstrates R LE mild weakness which is chronic for her. She has 24/7 caregivers at home who assist her with all mobility. She typically uses her wheelchair to manuver around the home. Today she requires some assist to perform bed mobility and to stand. She was able to transfer to the chair with use of a RW. She will benefit from continued PT to maintain her strength and mobility. Recommend discharge home with 24/7 caregivers.  -MS       Row Name 05/05/24 0810          Therapy Assessment/Plan (PT)    Patient/Family Therapy Goals Statement (PT) be able to go home  -MS     Rehab Potential (PT) good, to achieve stated therapy goals  -MS     Criteria for Skilled Interventions Met (PT) yes;meets criteria;skilled treatment is necessary  -MS     Therapy Frequency (PT) 2 times/day  -MS     Predicted Duration of Therapy Intervention (PT) until dicharge  -MS       Row Name 05/05/24 0810          Positioning and Restraints     Post Treatment Position chair  -MS     In Chair notified nsg;reclined;call light within reach;encouraged to call for assist;with family/caregiver  -MS               User Key  (r) = Recorded By, (t) = Taken By, (c) = Cosigned By      Initials Name Provider Type    Nicole Palacios EVERARDO, PT, DPT, NCS Physical Therapist                   Outcome Measures       Row Name 05/05/24 0810          How much help from another person do you currently need...    Turning from your back to your side while in flat bed without using bedrails? 3  -MS     Moving from lying on back to sitting on the side of a flat bed without bedrails? 3  -MS     Moving to and from a bed to a chair (including a wheelchair)? 3  -MS     Standing up from a chair using your arms (e.g., wheelchair, bedside chair)? 3  -MS     Climbing 3-5 steps with a railing? 1  -MS     To walk in hospital room? 1  -MS     AM-PAC 6 Clicks Score (PT) 14  -MS     Highest Level of Mobility Goal 4 --> Transfer to chair/commode  -MS       Row Name 05/05/24 0810          Functional Assessment    Outcome Measure Options AM-PAC 6 Clicks Basic Mobility (PT)  -MS               User Key  (r) = Recorded By, (t) = Taken By, (c) = Cosigned By      Initials Name Provider Type    Nicole Palacios EVERARDO, PT, DPT, NCS Physical Therapist                                 Physical Therapy Education       Title: PT OT SLP Therapies (In Progress)       Topic: Physical Therapy (In Progress)       Point: Mobility training (Done)       Learning Progress Summary             Patient Acceptance, E, VU by MS at 5/5/2024 0910    Comment: role of PT in her care    Acceptance, E, VU by KAMILLA at 5/2/2024 2240   Caregiver Acceptance, E, VU by KAMILLA at 5/2/2024 2240                         Point: Home exercise program (Done)       Learning Progress Summary             Patient Acceptance, E, VU by KAMILLA at 5/2/2024 2240   Caregiver Acceptance, E, VU by KAMILLA at 5/2/2024 2240                         Point: Body mechanics (Not  Started)       Learner Progress:  Not documented in this visit.              Point: Precautions (Done)       Learning Progress Summary             Patient Acceptance, E, VU by  at 5/2/2024 2240   Caregiver Acceptance, E, VU by  at 5/2/2024 2240                                         User Key       Initials Effective Dates Name Provider Type Discipline    MS 07/11/23 -  Nicole Olson, PT, DPT, NCS Physical Therapist PT     04/25/23 -  Rick Townsend, RN Registered Nurse Nurse                  PT Recommendation and Plan  Planned Therapy Interventions (PT): balance training, bed mobility training, gait training, patient/family education, strengthening, transfer training  Plan of Care Reviewed With: patient  Progress: improving  Outcome Evaluation: The patient presents alert and oriented x4 lying in bed. She demonstrates R LE mild weakness which is chronic for her. She has 24/7 caregivers at home who assist her with all mobility. She typically uses her wheelchair to manuver around the home. Today she requires some assist to perform bed mobility and to stand. She was able to transfer to the chair with use of a RW. She will benefit from continued PT to maintain her strength and mobility. Recommend discharge home with 24/7 caregivers.     Time Calculation:         PT Charges       Row Name 05/05/24 0810             Time Calculation    Start Time 0810  -MS      Stop Time 0855  -MS      Time Calculation (min) 45 min  -MS      PT Received On 05/05/24  -MS      PT Goal Re-Cert Due Date 05/15/24  -MS         Untimed Charges    PT Eval/Re-eval Minutes 45  -MS         Total Minutes    Untimed Charges Total Minutes 45  -MS       Total Minutes 45  -MS                User Key  (r) = Recorded By, (t) = Taken By, (c) = Cosigned By      Initials Name Provider Type    Nicole Palacios EVERARDO, PT, DPT, NCS Physical Therapist                      PT G-Codes  Outcome Measure Options: AM-PAC 6 Clicks Basic Mobility (PT)  AM-PAC 6 Clicks  Score (PT): 14  AM-PAC 6 Clicks Score (OT): 15  PT Discharge Summary  Anticipated Discharge Disposition (PT): home with 24/7 care, home with outpatient therapy services    Nicole Olson, PT, DPT, NCS  5/5/2024

## 2024-05-05 NOTE — PLAN OF CARE
Goal Outcome Evaluation:  Plan of Care Reviewed With: patient        Progress: improving  Outcome Evaluation: The patient presents alert and oriented x4 lying in bed. She demonstrates R LE mild weakness which is chronic for her. She has 24/7 caregivers at home who assist her with all mobility. She typically uses her wheelchair to manuver around the home. Today she requires some assist to perform bed mobility and to stand. She was able to transfer to the chair with use of a RW. She will benefit from continued PT to maintain her strength and mobility. Recommend discharge home with 24/7 caregivers.      Anticipated Discharge Disposition (PT): home with 24/7 care, home with outpatient therapy services

## 2024-05-05 NOTE — CASE MANAGEMENT/SOCIAL WORK
Continued Stay Note  [facility]     Patient Name: Tawny Shin  MRN: [id number]  Today's Date: 5/5/2024    Admit Date: 5/2/2024        Discharge Plan       Row Name 05/05/24 1108       Plan    Plan Comments SW spoke to pt about dc planning. Rehab placement vs home discussed. Pt states she prefers to return home at dc and has 24/7 care. Pt has all DME Needed in home. Pt is requesting [facility] at time of dc. Will need HH orders to arrange.                   Discharge Codes    No documentation.                       OSIEL Pizano

## 2024-05-06 LAB
ANION GAP SERPL CALCULATED.3IONS-SCNC: 8 MMOL/L (ref 5–15)
BACTERIA SPEC AEROBE CULT: ABNORMAL
BASOPHILS # BLD AUTO: 0.05 10*3/MM3 (ref 0–0.2)
BASOPHILS NFR BLD AUTO: 1.1 % (ref 0–1.5)
BUN SERPL-MCNC: 18 MG/DL (ref 8–23)
BUN/CREAT SERPL: 19.1 (ref 7–25)
CALCIUM SPEC-SCNC: 8.8 MG/DL (ref 8.6–10.5)
CHLORIDE SERPL-SCNC: 110 MMOL/L (ref 98–107)
CO2 SERPL-SCNC: 22 MMOL/L (ref 22–29)
CREAT SERPL-MCNC: 0.94 MG/DL (ref 0.57–1)
DEPRECATED RDW RBC AUTO: 59 FL (ref 37–54)
EGFRCR SERPLBLD CKD-EPI 2021: 65.4 ML/MIN/1.73
EOSINOPHIL # BLD AUTO: 0.16 10*3/MM3 (ref 0–0.4)
EOSINOPHIL NFR BLD AUTO: 3.5 % (ref 0.3–6.2)
ERYTHROCYTE [DISTWIDTH] IN BLOOD BY AUTOMATED COUNT: 16.8 % (ref 12.3–15.4)
GLUCOSE SERPL-MCNC: 85 MG/DL (ref 65–99)
HCT VFR BLD AUTO: 28.2 % (ref 34–46.6)
HGB BLD-MCNC: 8.5 G/DL (ref 12–15.9)
IMM GRANULOCYTES # BLD AUTO: 0.06 10*3/MM3 (ref 0–0.05)
IMM GRANULOCYTES NFR BLD AUTO: 1.3 % (ref 0–0.5)
LYMPHOCYTES # BLD AUTO: 1.78 10*3/MM3 (ref 0.7–3.1)
LYMPHOCYTES NFR BLD AUTO: 39 % (ref 19.6–45.3)
MCH RBC QN AUTO: 29.1 PG (ref 26.6–33)
MCHC RBC AUTO-ENTMCNC: 30.1 G/DL (ref 31.5–35.7)
MCV RBC AUTO: 96.6 FL (ref 79–97)
MONOCYTES # BLD AUTO: 0.55 10*3/MM3 (ref 0.1–0.9)
MONOCYTES NFR BLD AUTO: 12.1 % (ref 5–12)
NEUTROPHILS NFR BLD AUTO: 1.96 10*3/MM3 (ref 1.7–7)
NEUTROPHILS NFR BLD AUTO: 43 % (ref 42.7–76)
NRBC BLD AUTO-RTO: 0 /100 WBC (ref 0–0.2)
PLATELET # BLD AUTO: 141 10*3/MM3 (ref 140–450)
PMV BLD AUTO: 11.1 FL (ref 6–12)
POTASSIUM SERPL-SCNC: 4 MMOL/L (ref 3.5–5.2)
RBC # BLD AUTO: 2.92 10*6/MM3 (ref 3.77–5.28)
SODIUM SERPL-SCNC: 140 MMOL/L (ref 136–145)
WBC NRBC COR # BLD AUTO: 4.56 10*3/MM3 (ref 3.4–10.8)

## 2024-05-06 PROCEDURE — 97530 THERAPEUTIC ACTIVITIES: CPT

## 2024-05-06 PROCEDURE — 25010000002 CEFEPIME PER 500 MG: Performed by: INTERNAL MEDICINE

## 2024-05-06 PROCEDURE — 63710000001 PREDNISONE PER 5 MG: Performed by: NURSE PRACTITIONER

## 2024-05-06 PROCEDURE — 97535 SELF CARE MNGMENT TRAINING: CPT

## 2024-05-06 PROCEDURE — 85025 COMPLETE CBC W/AUTO DIFF WBC: CPT | Performed by: INTERNAL MEDICINE

## 2024-05-06 PROCEDURE — 97110 THERAPEUTIC EXERCISES: CPT

## 2024-05-06 PROCEDURE — 92526 ORAL FUNCTION THERAPY: CPT

## 2024-05-06 PROCEDURE — 99232 SBSQ HOSP IP/OBS MODERATE 35: CPT | Performed by: INTERNAL MEDICINE

## 2024-05-06 PROCEDURE — 80048 BASIC METABOLIC PNL TOTAL CA: CPT | Performed by: INTERNAL MEDICINE

## 2024-05-06 RX ORDER — DIPHENHYDRAMINE HYDROCHLORIDE AND LIDOCAINE HYDROCHLORIDE AND ALUMINUM HYDROXIDE AND MAGNESIUM HYDRO
10 KIT EVERY 6 HOURS SCHEDULED
Status: DISCONTINUED | OUTPATIENT
Start: 2024-05-06 | End: 2024-05-09 | Stop reason: HOSPADM

## 2024-05-06 RX ORDER — SALIVA STIMULANT COMB. NO.3
1 SPRAY, NON-AEROSOL (ML) MUCOUS MEMBRANE
Status: DISCONTINUED | OUTPATIENT
Start: 2024-05-06 | End: 2024-05-09 | Stop reason: HOSPADM

## 2024-05-06 RX ADMIN — ASPIRIN 81 MG: 81 TABLET, COATED ORAL at 09:06

## 2024-05-06 RX ADMIN — Medication 1 SPRAY: at 18:08

## 2024-05-06 RX ADMIN — FOLIC ACID 1 MG: 1 TABLET ORAL at 09:06

## 2024-05-06 RX ADMIN — APIXABAN 5 MG: 5 TABLET, FILM COATED ORAL at 09:06

## 2024-05-06 RX ADMIN — LEVOTHYROXINE SODIUM 75 MCG: 75 TABLET ORAL at 05:28

## 2024-05-06 RX ADMIN — POTASSIUM CHLORIDE 20 MEQ: 1500 TABLET, EXTENDED RELEASE ORAL at 09:05

## 2024-05-06 RX ADMIN — Medication 10 ML: at 09:07

## 2024-05-06 RX ADMIN — PREDNISONE 5 MG: 5 TABLET ORAL at 09:07

## 2024-05-06 RX ADMIN — CEFEPIME 2000 MG: 2 INJECTION, POWDER, FOR SOLUTION INTRAVENOUS at 09:04

## 2024-05-06 RX ADMIN — ACETAMINOPHEN 650 MG: 325 TABLET, FILM COATED ORAL at 05:30

## 2024-05-06 RX ADMIN — DIPHENHYDRAMINE HYDROCHLORIDE AND LIDOCAINE HYDROCHLORIDE AND ALUMINUM HYDROXIDE AND MAGNESIUM HYDRO 10 ML: KIT at 11:47

## 2024-05-06 RX ADMIN — DULOXETINE HYDROCHLORIDE 60 MG: 30 CAPSULE, DELAYED RELEASE ORAL at 09:07

## 2024-05-06 RX ADMIN — Medication 1 SPRAY: at 11:47

## 2024-05-06 RX ADMIN — DIPHENHYDRAMINE HYDROCHLORIDE AND LIDOCAINE HYDROCHLORIDE AND ALUMINUM HYDROXIDE AND MAGNESIUM HYDRO 10 ML: KIT at 18:08

## 2024-05-06 RX ADMIN — CARVEDILOL 12.5 MG: 6.25 TABLET, FILM COATED ORAL at 18:08

## 2024-05-06 RX ADMIN — Medication 1 SPRAY: at 21:09

## 2024-05-06 RX ADMIN — LEFLUNOMIDE 20 MG: 20 TABLET ORAL at 21:09

## 2024-05-06 RX ADMIN — Medication 1 SPRAY: at 14:30

## 2024-05-06 RX ADMIN — CARVEDILOL 12.5 MG: 6.25 TABLET, FILM COATED ORAL at 09:06

## 2024-05-06 RX ADMIN — APIXABAN 5 MG: 5 TABLET, FILM COATED ORAL at 21:09

## 2024-05-06 RX ADMIN — CEFEPIME 2000 MG: 2 INJECTION, POWDER, FOR SOLUTION INTRAVENOUS at 16:22

## 2024-05-06 RX ADMIN — TRAMADOL HYDROCHLORIDE 50 MG: 50 TABLET ORAL at 21:09

## 2024-05-06 RX ADMIN — ISOSORBIDE MONONITRATE 60 MG: 60 TABLET, EXTENDED RELEASE ORAL at 09:05

## 2024-05-06 RX ADMIN — Medication 10 ML: at 21:09

## 2024-05-06 NOTE — PLAN OF CARE
Goal Outcome Evaluation: Pt is alert and oriented, no c/o pain, room air, IV is clean dry and intact, no s/s of distress, pt up with 2 assist, incont, purewick in place,            Progress: improving

## 2024-05-06 NOTE — PLAN OF CARE
Goal Outcome Evaluation:  Plan of Care Reviewed With: patient, caregiver        Progress: improving  Outcome Evaluation: OT tx completed. Pt in fowlers with no complaints. Pt performed sup>sit w Jose R, improved bed mobility this date. Pt performed 3 x 10 reps B shoulder raises & bicep curls with 2 lb DBs. Pt stood twice & took steps toward the HOB w min-modA. Unable to stand long enough to take more than 1 step at a time. Pt returned to supine w Jose R, washed face with setup once in supine. Pt has made good progress, would benefit from cont OT. Anticipate d/c home w 24/7 assist.

## 2024-05-06 NOTE — THERAPY DISCHARGE NOTE
"Acute Care - Speech Language Pathology   Swallow Treatment Note/Discharge [facility]     Patient Name: Tawny Shin  : 1955  MRN: [id number]  Today's Date: 2024               Admit Date: 2024  Pt was observed with breakfast tray of soft solids and thin liquids. Pt had good mastication and clearance of solid residue. No overt s/s of aspiration observed. Pt reports better tolerance of soft solids vs regular solids. She states that she is happy the meats are chopped rather than whole. She does have Sjogren's and experiences dry mouth. Shed does use Biotene toothpaste at home and brought some to the hospital. She also complains of her mouth feeling \"raw\" and states that the physician said it was likely due to antibiotic. No definite thrush observed. Sent a message to hospitalist and nurse about ordering Magic mouthwash and Biotene spray. Pt states that she occasionally chokes on food or drink at home. Discussed the likelihood of dry mouth increasing dysphagia with solid foods and continuing with soft foods and compensatory strategies to address that issue. She states that she drinks from a Antonio cup with a larger diameter straw at home. Advised her to try drinking from the rim of a smaller cup or using a smaller diameter straw with the Antonio cup. Pt and visitor verbalized understanding. Pt appears to be at baseline with current diet. Recommend continuing soft solids with chopped meats and thin liquids. SLP will sign off. Please re-consult if there are any new concerns.  Dillon Pierson CCC-SLP 2024 09:14 CDT    Visit Dx:    ICD-10-CM ICD-9-CM   1. Weakness  R53.1 780.79   2. Urinary tract infection without hematuria, site unspecified  N39.0 599.0   3. Dysphagia, unspecified type [R13.10]  R13.10 787.20     Patient Active Problem List   Diagnosis    T12 compression fracture, initial encounter    Chronic embolism and thrombosis of unspecified deep veins of left lower extremity    Chronic " anticoagulation    Iron deficiency anemia    Osteoporosis    E coli bacteremia    Epidural hematoma    Pleural effusion, left    Functional neurological symptom disorder with weakness or paralysis    Overweight (BMI 25.0-29.9)    Rheumatoid arthritis involving multiple sites    (HFpEF) heart failure with preserved ejection fraction    Venous insufficiency    Coronary artery disease involving native coronary artery of native heart without angina pectoris    Sick sinus syndrome    Essential hypertension    Pressure injury of skin of heel    Chronic pain    Anemia of chronic disease    Cholelithiasis    Pressure injury of skin of buttock    Near functional paraplegia    Skin cancer    Squamous cell carcinoma of back    Hematoma    Right-sided chest wall pain    Hyperlipidemia LDL goal <70    Malodorous urine    UTI (urinary tract infection)    Stage 3b chronic kidney disease    Cyst of buttocks    Sepsis due to Escherichia coli without acute organ dysfunction    Generalized weakness    Paralysis    History of urinary retention    Bacteremia due to Enterobacter species    Bacteremia     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    Elevated cholesterol     Eustachian tube dysfunction     Heart disease     Herpes simplex     History of transfusion     Hyperlipidemia     Hypertension     Hypothyroidism 12/23/2020    Intrinsic asthma 12/23/2020    Knee dislocation     Labral tear of right hip joint      "Laryngitis sicca     Laryngitis, chronic     Left carotid bruit 03/09/2016    MI (myocardial infarction)     Myalgia due to statin 06/25/2019    Open wound of right hip 09/14/2021    Osteomyelitis of right femur 07/06/2021    Otorrhea     Pacemaker 11/17/2016    Primary osteoarthritis of left knee 12/23/2020    Psoriasis vulgaris 12/23/2020    S/P coronary artery stent placement 03/09/2016    Sensorineural hearing loss     Seropositive rheumatoid arthritis of multiple sites 12/27/2019    Overview:  -myochrysine '93-'96 -methotrexate '96--->11/98;r/s  restarted 2/99--> 8/14 (anemia) -sulfasalazine- not effective -penicillamine 6/98-->10/98; no effect -leflunomide 11/98--> - Humira '13-->didn't take - Enbrel 12/14-->3/15- no effect!   Last Assessment & Plan:  - \"aching all over\" because she had to be off her anti-rheumatic drugs for 2 weeks in preparation for her R knee surgery - he    Sick sinus syndrome 12/27/2019    Sjogren's disease     Spondylolisthesis of lumbar region 01/17/2018    Syncope, recurrent 02/08/2021    Urinary tract infection      Past Surgical History:   Procedure Laterality Date    A-V CARDIAC PACEMAKER INSERTION  2016    ATRIAL CARDIAC PACEMAKER INSERTION      CARDIAC CATHETERIZATION      CATARACT EXTRACTION      CERVICAL CORPECTOMY N/A 3/3/2021    Procedure: CERVICAL 6 CORPECTOMY WITH TITANIUM CAGE WITH NEURO MONITORING;  Surgeon: Bandar Shea MD;  Location:  [facility] OR;  Service: Neurosurgery;  Laterality: N/A;    COLONOSCOPY  11/08/2011    One fold in the ascending colon which showed ulcer otherwise normal exam    COLONOSCOPY  11/12/2004    Normal exam repeat in five years    CORONARY ANGIOPLASTY WITH STENT PLACEMENT      X 2; 2013 & 2014    ENDOSCOPY  07/10/2014    Normal exam    FLAP LEG Right 9/14/2021    Procedure: RIGHT GLUTEAL FASCIOCUTANEOUS ADVANCEMENT FLAP AND RIGHT TENSOR FASCIAL JESSICA FLAP;  Surgeon: Amadeo Turner MD;  Location:  [facility] OR;  Service: Plastics;  " Laterality: Right;    HIP ABDUCTION TENOTOMY BILATERAL Right 1/14/2021    Procedure: RIGHT HIP GLUTEUS MEDLUS / MINIMUS REPAIR, POSSIBLE ACHILLES ALLOGRAFT;  Surgeon: Nino Carlson MD;  Location:  [facility] OR;  Service: Orthopedics;  Laterality: Right;    INCISION AND DRAINAGE ABSCESS Right 6/4/2022    Procedure: INCISION AND DRAINAGE ABSCESS right hip;  Surgeon: Magda Salcido MD;  Location:  [facility];  Service: General;  Laterality: Right;    INCISION AND DRAINAGE ABSCESS Right 6/10/2022    Procedure: RIGHT HIP INCISION AND DRAINAGE. MD NEEDS 3L VANC IRRIGATION, CURRETTES, DAICANS, KERLEX ROLLS;  Surgeon: Amadeo Turner MD;  Location:  [facility];  Service: Plastics;  Laterality: Right;    INCISION AND DRAINAGE HIP Right 2/9/2021    Procedure: HIP INCISION AND DRAINAGE;  Surgeon: Nino Carlson MD;  Location:  [facility];  Service: Orthopedics;  Laterality: Right;    INCISION AND DRAINAGE LEG Right 10/24/2021    Procedure: INCISION AND DRAINAGE LOWER EXTREMITY;  Surgeon: Amadeo Turner MD;  Location:  [facility];  Service: Plastics;  Laterality: Right;    INCISION AND DRAINAGE OF WOUND Right 7/8/2021    Procedure: INCISION AND DRAINAGE WOUND RIGHT HIP;  Surgeon: James Huntley MD;  Location:  [facility] OR;  Service: Orthopedics;  Laterality: Right;    JOINT REPLACEMENT      KYPHOPLASTY WITH BIOPSY Bilateral 10/26/2021    Procedure: THOARCIC 12 KYPHOPLASTY WITH BIOPSY;  Surgeon: Bandar Shea MD;  Location:  [facility];  Service: Neurosurgery;  Laterality: Bilateral;    LEG DEBRIDEMENT Right 9/14/2021    Procedure: DEBRIDEMENT OF RIGHT HIP WOUND, RIGHT GLUTEAL FASCIOCUTANEOUS ADVANCEMENT FLAP AND RIGHT TENSOR FASCIAL JESSICA FLAP;  Surgeon: Amadeo Turner MD;  Location:  [facility];  Service: Plastics;  Laterality: Right;    LUMBAR DISCECTOMY Right 3/23/2021    Procedure: LUMBAR DISCECTOMY MICRO, Lumbar 1/2 right;  Surgeon: Bandar Shea MD;  Location:  [facility];  Service: Neurosurgery;   Laterality: Right;    LUMBAR FUSION N/A 1/19/2018    Procedure: L3-4,L4-5 DECOMPRESSION, POSTERIOR SPINAL FUSION WITH INSTRUMENTATION;  Surgeon: Fortino Oropeza MD;  Location: [facility] OR;  Service:     LUMBAR LAMINECTOMY WITH FUSION Left 1/17/2018    Procedure: LEFT L3-4 L4-5 LATERAL LUMBAR INTERBODY FUSION;  Surgeon: Fortino Oropeza MD;  Location:  [facility] OR;  Service:     MASS EXCISION Right 4/23/2024    Procedure: RIGHT BUTTOCK MASS EXCISION;  Surgeon: Moises Keyes MD;  Location: [facility] OR;  Service: General;  Laterality: Right;    MYRINGOTOMY W/ TUBES  09/04/2014    TUBES NO LONGER IN PLACE    OTHER SURGICAL HISTORY      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 so hardware was removed and spacers were placed    REPLACEMENT TOTAL KNEE Right        SLP Recommendation and Plan                    Anticipated Discharge Disposition (SLP): home with assist (05/06/24 0913)              Daily Summary of Progress (SLP): progress toward functional goals is good (05/06/24 0825)     Anticipated Discharge Disposition (SLP): home with assist (05/06/24 0913)           Reason for Discharge: all goals and outcomes met, no further needs identified (05/06/24 0913)     Treatment Assessment (SLP): improved (05/06/24 0825)  Treatment Assessment Comments (SLP): Discharge (05/06/24 0825)  Plan for Continued Treatment (SLP): treatment no longer indicated as all goals met (05/06/24 0825)    Plan of Care Reviewed With: patient (05/06/24 0905)  Progress: improving (05/06/24 0905)  Outcome Evaluation: See note (05/06/24 0905)    SWALLOW EVALUATION (Last 72 Hours)       SLP Adult Swallow Evaluation       Row Name 05/06/24 0825                   Rehab Evaluation    Document Type discharge treatment  -MB        Subjective Information no complaints  -MB        Patient Observations alert;cooperative  -MB        Patient/Family/Caregiver Comments/Observations Female family member arrived during session  -MB           Pain    Additional Documentation  Pain Scale: FACES Pre/Post-Treatment (Group)  -MB           Pain Scale: FACES Pre/Post-Treatment    Pain: FACES Scale, Pretreatment 0-->no hurt  -MB           SLP Treatment Clinical Impressions    Treatment Assessment (SLP) improved  -MB        Treatment Assessment Comments (SLP) Discharge  -MB        Daily Summary of Progress (SLP) progress toward functional goals is good  -MB        Plan for Continued Treatment (SLP) treatment no longer indicated as all goals met  -MB           (LTG) Patient will demonstrate functional swallow for    Diet Texture (Demonstrate functional swallow) soft to chew (chopped) textures  -MB        Liquid viscosity (Demonstrate functional swallow) thin liquids  -MB        [place] (Demonstrate functional swallow) independently (over 90% accuracy)  -MB        Time Frame (Demonstrate functional swallow) by discharge  -MB        Barriers (Demonstrate functional swallow) none  -MB        Progress/Outcomes (Demonstrate functional swallow) goal met  -MB           (STG) Patient will tolerate trials of    Consistencies Trialed (Tolerate trials) soft to chew (chopped) textures;thin liquids  -MB        Desired Outcome (Tolerate trials) without signs/symptoms of aspiration;without signs of distress;with adequate oral prep/transit/clearance  -MB        [place] (Tolerate trials) with minimal cues (75-90% accuracy)  -MB        Time Frame (Tolerate trials) by discharge  -MB        Progress/Outcomes (Tolerate trials) goal met  -MB                  User Key  (r) = Recorded By, (t) = Taken By, (c) = Cosigned By      Initials Name Effective Dates    Dillon Ball, CCC-SLP 02/03/23 -                     EDUCATION  The patient has been educated in the following areas:   Dysphagia (Swallowing Impairment).         SLP GOALS       Row Name 05/06/24 0275             (LTG) Patient will demonstrate functional swallow for    Diet Texture (Demonstrate functional swallow) soft to chew (chopped) textures   -MB      Liquid viscosity (Demonstrate functional swallow) thin liquids  -MB      [place] (Demonstrate functional swallow) independently (over 90% accuracy)  -MB      Time Frame (Demonstrate functional swallow) by discharge  -MB      Barriers (Demonstrate functional swallow) none  -MB      Progress/Outcomes (Demonstrate functional swallow) goal met  -MB         (STG) Patient will tolerate trials of    Consistencies Trialed (Tolerate trials) soft to chew (chopped) textures;thin liquids  -MB      Desired Outcome (Tolerate trials) without signs/symptoms of aspiration;without signs of distress;with adequate oral prep/transit/clearance  -MB      [place] (Tolerate trials) with minimal cues (75-90% accuracy)  -MB      Time Frame (Tolerate trials) by discharge  -MB      Progress/Outcomes (Tolerate trials) goal met  -MB                User Key  (r) = Recorded By, (t) = Taken By, (c) = Cosigned By      Initials Name Provider Type    Dillon Ball CCC-SLP Speech and Language Pathologist                         Time Calculation:    Time Calculation- SLP       Row Name 05/06/24 0913             Time Calculation- SLP    SLP Start Time 0825  -MB      SLP Stop Time 0913  -MB      SLP Time Calculation (min) 48 min  -MB      SLP Received On 05/06/24  -MB         Untimed Charges    33154-NT Treatment Swallow Minutes 48  -MB         Total Minutes    Untimed Charges Total Minutes 48  -MB       Total Minutes 48  -MB                User Key  (r) = Recorded By, (t) = Taken By, (c) = Cosigned By      Initials Name Provider Type    Dillon Ball CCC-SLP Speech and Language Pathologist                    Therapy Charges for Today       Code Description Service Date Service Provider Modifiers Qty    [id number]  ST TREATMENT SWALLOW 3 5/6/2024 Dillon Pierson CCC-SLP GN 1                 SLP Discharge Summary  Anticipated Discharge Disposition (SLP): home with assist  Reason for Discharge: all goals and  outcomes met, no further needs identified  Progress Toward Achieving Short/long Term Goals: all goals met within established timelines  Discharge Destination: other (see comments) (Still in acute care)    Dillon Pierson CCC-SLP  5/6/2024

## 2024-05-06 NOTE — PROGRESS NOTES
[facility] Medicine Services  INPATIENT PROGRESS NOTE    Patient Name: Tawny Shin  Date of Admission: 5/2/2024  Today's Date: 05/06/24  Length of Stay: 3  Primary Care Physician: Moises Oliveira MD    Subjective   Chief Complaint: Bacteremia/UTI.  HPI   Blood pressure slightly high, afebrile.  Restart Cozaar.  Patient is asking to go home.  Patient is on room air.  Creatinine normalized.  Hemoglobin stable.  White blood cells normal.      Review of Systems   Constitutional:  Positive for activity change, appetite change and fatigue. Negative for chills and fever.   HENT:  Negative for hearing loss, nosebleeds, tinnitus and trouble swallowing.    Eyes:  Negative for visual disturbance.   Respiratory:  Negative for cough, chest tightness, shortness of breath and wheezing.    Cardiovascular:  Negative for chest pain, palpitations and leg swelling.   Gastrointestinal:  Negative for abdominal distention, abdominal pain, blood in stool, constipation, diarrhea, nausea and vomiting.   Endocrine: Negative for cold intolerance, heat intolerance, polydipsia, polyphagia and polyuria.   Genitourinary:  Negative for decreased urine volume, difficulty urinating, dysuria, flank pain, frequency and hematuria.   Musculoskeletal:  Positive for arthralgias, gait problem and myalgias. Negative for joint swelling.   Skin:  Negative for rash.   Allergic/Immunologic: Negative for immunocompromised state.   Neurological:  Positive for weakness. Negative for dizziness, syncope, light-headedness and headaches.   Hematological:  Negative for adenopathy. Does not bruise/bleed easily.   Psychiatric/Behavioral:  Negative for confusion and sleep disturbance. The patient is not nervous/anxious.         All pertinent negatives and positives are as above. All other systems have been reviewed and are negative unless otherwise stated.     Objective    Temp:  [98 °F (36.7 °C)-98.3 °F (36.8 °C)] 98.1 °F  (36.7 °C)  Heart Rate:  [70-74] 70  Resp:  [16-18] 16  BP: (127-166)/(67-70) 144/68  Physical Exam  Vitals and nursing note reviewed.   Constitutional:       Comments: Chronically ill.   HENT:      Head: Normocephalic.   Eyes:      Conjunctiva/sclera: Conjunctivae normal.      Pupils: Pupils are equal, round, and reactive to light.   Cardiovascular:      Rate and Rhythm: Normal rate and regular rhythm.      Heart sounds: Normal heart sounds.   Pulmonary:      Effort: Pulmonary effort is normal. No respiratory distress.      Breath sounds: Normal breath sounds.   Abdominal:      General: Bowel sounds are normal. There is no distension.      Palpations: Abdomen is soft.      Tenderness: There is no abdominal tenderness.      Comments: Obesity .   Musculoskeletal:         General: No swelling.      Cervical back: Neck supple.      Comments: Deformities of the hand from arthritis.   Skin:     General: Skin is warm and dry.      Capillary Refill: Capillary refill takes 2 to 3 seconds.      Findings: No rash.   Neurological:      Mental Status: She is alert and oriented to person, place, and time.      Motor: Weakness present.      Coordination: Coordination abnormal.      Gait: Gait abnormal.   Psychiatric:         Mood and Affect: Mood normal.         Behavior: Behavior normal.         Thought Content: Thought content normal.             Results Review:  I have reviewed the labs, radiology results, and diagnostic studies.    Laboratory Data:   Results from last 7 days   Lab Units 05/06/24 0445 05/05/24 0426 05/04/24 0518   WBC 10*3/mm3 4.56 4.69 5.29   HEMOGLOBIN g/dL 8.5* 8.7* 8.6*   HEMATOCRIT % 28.2* 28.0* 27.8*   PLATELETS 10*3/mm3 141 132* 127*        Results from last 7 days   Lab Units 05/06/24  0445 05/05/24  0426 05/04/24  0518 05/03/24  0518 05/02/24  1905 05/01/24  1344   SODIUM mmol/L 140 142 139   < > 139 138   POTASSIUM mmol/L 4.0 3.6 3.5   < > 3.5 3.1*   CHLORIDE mmol/L 110* 109* 104   < > 99 98   CO2  mmol/L 22.0 25.0 26.0   < > 29.0 24.4   BUN mg/dL 18 27* 32*   < > 29* 26*   CREATININE mg/dL 0.94 1.31* 1.79*   < > 1.89* 1.58*   CALCIUM mg/dL 8.8 8.6 8.5*   < > 9.3 9.3   BILIRUBIN mg/dL  --   --   --   --  0.4 0.3   ALK PHOS U/L  --   --   --   --  68 76   ALT (SGPT) U/L  --   --   --   --  24 24   AST (SGOT) U/L  --   --   --   --  26 21   GLUCOSE mg/dL 85 90 90   < > 110* 175*    < > = values in this interval not displayed.       Culture Data:   Blood Culture   Date Value Ref Range Status   05/03/2024 No growth at 2 days  Preliminary   05/02/2024 Enterobacter cloacae complex (C)  Preliminary   05/02/2024 No growth at 3 days  Preliminary     Urine Culture   Date Value Ref Range Status   05/02/2024 >100,000 CFU/mL Enterobacter cloacae complex CRE (A)  Preliminary     Comment:       Consider infectious disease consult.  Susceptibility results may not correlate to clinical outcomes.  Carbapenem resistant Enterobacteriaceae, patient may be an isolation risk.       Radiology Data:   Imaging Results (Last 24 Hours)       ** No results found for the last 24 hours. **            I have reviewed the patient's current medications.     Assessment/Plan   Assessment  Active Hospital Problems    Diagnosis     **Bacteremia due to Enterobacter species     Generalized weakness     History of urinary retention     Stage 3b chronic kidney disease     UTI (urinary tract infection)     Anemia of chronic disease     Coronary artery disease involving native coronary artery of native heart without angina pectoris     Sick sinus syndrome     Rheumatoid arthritis involving multiple sites     Chronic anticoagulation     Iron deficiency anemia     Chronic embolism and thrombosis of unspecified deep veins of left lower extremity        Treatment Plan    UTI/bacteremia.  ID consult.  Cefepime.    Chronic stage IIIb renal failure.  Creatinine normalized.    Rheumatoid arthritis/Hx Sjogren .  Biotene.  Magic mouthwash. Arava.  Prednisone  daily.    History of DVT . Eliquis.    CAD/hypertension/hyperlipidemia.  Aspirin. . Hold Lasix.  Imdur . Restart Cozaar.  Nitro as needed.    Depression/anxiety.  Cymbalta.    Hypokalemia.  Normal.  Hold p.o. potassium daily.    Anemia.  Hemoglobin stable.  No sign of acute bleed.    Thrombocytopenia.  Platelet count is normal today.    Hypothyroidism . Synthroid.    Reflux.  Tums.  Zofran as needed.    Constipation.  Magnesium oxide as needed.  Constipation protocol  as needed.  .    Pain.  Voltaren gel as needed.  Lidocaine patch as needed.  Ultram as needed.    Nutrition . Folic acid.  Cardiac diet.  Boost supplement.    Deconditioning.  PT and OT consult.  Patient get around with a walker at baseline.    Blood culture- Enterobacter cloacae complex  . rine culture-Enterobacter cloacae complex CRE, repeat UA    Dr. Shin's mom.    Medical Decision Making  Number and Complexity of problems: UTI/bacteremia/chronic stage IIIb renal failure/rheumatoid arthritis/CAD/hypertension  Differential Diagnosis: None    Conditions and Status        Condition is unchanged.     MDM Data  External documents reviewed: Previous note  Cardiac tracing (EKG, telemetry) interpretation: Sinus  Radiology interpretation: None  Labs reviewed: Laboratory  Any tests that were considered but not ordered: Lab in a.m.     Decision rules/scores evaluated (example [id number], [place], etc): None     Discussed with: Patient and caregiver     Care Planning  Shared decision making: Patient and caregiver  Code status and discussions: Full code    Disposition  Social Determinants of Health that impact treatment or disposition: From home  1 to 3 days.    Electronically signed by Saulo Ambriz MD, 05/06/24, 12:09 CDT.

## 2024-05-06 NOTE — THERAPY TREATMENT NOTE
Acute Care - Physical Therapy Treatment Note  [facility]     Patient Name: Tawny Shin  : 1955  MRN: [id number]  Today's Date: 2024      Visit Dx:     ICD-10-CM ICD-9-CM   1. Weakness  R53.1 780.79   2. Urinary tract infection without hematuria, site unspecified  N39.0 599.0   3. Dysphagia, unspecified type [R13.10]  R13.10 787.20   4. Generalized weakness [R53.1]  R53.1 780.79     Patient Active Problem List   Diagnosis    T12 compression fracture, initial encounter    Chronic embolism and thrombosis of unspecified deep veins of left lower extremity    Chronic anticoagulation    Iron deficiency anemia    Osteoporosis    E coli bacteremia    Epidural hematoma    Pleural effusion, left    Functional neurological symptom disorder with weakness or paralysis    Overweight (BMI 25.0-29.9)    Rheumatoid arthritis involving multiple sites    (HFpEF) heart failure with preserved ejection fraction    Venous insufficiency    Coronary artery disease involving native coronary artery of native heart without angina pectoris    Sick sinus syndrome    Essential hypertension    Pressure injury of skin of heel    Chronic pain    Anemia of chronic disease    Cholelithiasis    Pressure injury of skin of buttock    Near functional paraplegia    Skin cancer    Squamous cell carcinoma of back    Hematoma    Right-sided chest wall pain    Hyperlipidemia LDL goal <70    Malodorous urine    UTI (urinary tract infection)    Stage 3b chronic kidney disease    Cyst of buttocks    Sepsis due to Escherichia coli without acute organ dysfunction    Generalized weakness    Paralysis    History of urinary retention    Bacteremia due to Enterobacter species    Bacteremia     Past Medical History:   Diagnosis Date    Age-related osteoporosis with current pathological fracture 2020    Arthritis     Asthma     Bilateral bunions 2020    Cancer     Cardiac pacemaker syndrome 2020    Overview:  - heart block - implanted  "11/16    Charcot's joint of foot, left 12/23/2020    Chronic deep vein thrombosis (DVT) of right lower extremity 06/23/2021    Chronic pain syndrome 06/22/2021    Chronic sinusitis     COPD (chronic obstructive pulmonary disease)     Coronary artery disease     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    Elevated cholesterol     Eustachian tube dysfunction     Heart disease     Herpes simplex     History of transfusion     Hyperlipidemia     Hypertension     Hypothyroidism 12/23/2020    Intrinsic asthma 12/23/2020    Knee dislocation     Labral tear of right hip joint     Laryngitis sicca     Laryngitis, chronic     Left carotid bruit 03/09/2016    MI (myocardial infarction)     Myalgia due to statin 06/25/2019    Open wound of right hip 09/14/2021    Osteomyelitis of right femur 07/06/2021    Otorrhea     Pacemaker 11/17/2016    Primary osteoarthritis of left knee 12/23/2020    Psoriasis vulgaris 12/23/2020    S/P coronary artery stent placement 03/09/2016    Sensorineural hearing loss     Seropositive rheumatoid arthritis of multiple sites 12/27/2019    Overview:  -myochrysine '93-'96 -methotrexate '96--->11/98;r/s  restarted 2/99--> 8/14 (anemia) -sulfasalazine- not effective -penicillamine 6/98-->10/98; no effect -leflunomide 11/98--> - Humira '13-->didn't take - Enbrel 12/14-->3/15- no effect!   Last Assessment & Plan:  - \"aching all over\" because she had to be off her anti-rheumatic drugs for 2 weeks in preparation for her R knee surgery - he    Sick sinus syndrome 12/27/2019    Sjogren's disease     Spondylolisthesis of lumbar region 01/17/2018    Syncope, recurrent 02/08/2021    Urinary tract infection      Past Surgical History:   Procedure Laterality Date    A-V CARDIAC PACEMAKER INSERTION  2016    ATRIAL CARDIAC PACEMAKER INSERTION      CARDIAC CATHETERIZATION      CATARACT EXTRACTION      CERVICAL CORPECTOMY N/A 3/3/2021    Procedure: CERVICAL 6 CORPECTOMY WITH TITANIUM CAGE WITH NEURO MONITORING;  " Surgeon: Bandar Shea MD;  Location:  [facility];  Service: Neurosurgery;  Laterality: N/A;    COLONOSCOPY  11/08/2011    One fold in the ascending colon which showed ulcer otherwise normal exam    COLONOSCOPY  11/12/2004    Normal exam repeat in five years    CORONARY ANGIOPLASTY WITH STENT PLACEMENT      X 2; 2013 & 2014    ENDOSCOPY  07/10/2014    Normal exam    FLAP LEG Right 9/14/2021    Procedure: RIGHT GLUTEAL FASCIOCUTANEOUS ADVANCEMENT FLAP AND RIGHT TENSOR FASCIAL JESSICA FLAP;  Surgeon: Amadeo Turner MD;  Location:  [facility];  Service: Plastics;  Laterality: Right;    HIP ABDUCTION TENOTOMY BILATERAL Right 1/14/2021    Procedure: RIGHT HIP GLUTEUS MEDLUS / MINIMUS REPAIR, POSSIBLE ACHILLES ALLOGRAFT;  Surgeon: Nino Carlson MD;  Location:  [facility];  Service: Orthopedics;  Laterality: Right;    INCISION AND DRAINAGE ABSCESS Right 6/4/2022    Procedure: INCISION AND DRAINAGE ABSCESS right hip;  Surgeon: Magda Salcido MD;  Location:  [facility];  Service: General;  Laterality: Right;    INCISION AND DRAINAGE ABSCESS Right 6/10/2022    Procedure: RIGHT HIP INCISION AND DRAINAGE. MD NEEDS 3L VANC IRRIGATION, CURRETTES, DAICANS, KERLEX ROLLS;  Surgeon: Amadeo Turner MD;  Location:  [facility];  Service: Plastics;  Laterality: Right;    INCISION AND DRAINAGE HIP Right 2/9/2021    Procedure: HIP INCISION AND DRAINAGE;  Surgeon: Nino Carlson MD;  Location:  [facility];  Service: Orthopedics;  Laterality: Right;    INCISION AND DRAINAGE LEG Right 10/24/2021    Procedure: INCISION AND DRAINAGE LOWER EXTREMITY;  Surgeon: Amadeo Turner MD;  Location:  [facility];  Service: Plastics;  Laterality: Right;    INCISION AND DRAINAGE OF WOUND Right 7/8/2021    Procedure: INCISION AND DRAINAGE WOUND RIGHT HIP;  Surgeon: James Huntley MD;  Location:  [facility];  Service: Orthopedics;  Laterality: Right;    JOINT REPLACEMENT      KYPHOPLASTY WITH BIOPSY Bilateral 10/26/2021    Procedure: THOARCIC 12  KYPHOPLASTY WITH BIOPSY;  Surgeon: Bandar Shea MD;  Location:  [facility];  Service: Neurosurgery;  Laterality: Bilateral;    LEG DEBRIDEMENT Right 9/14/2021    Procedure: DEBRIDEMENT OF RIGHT HIP WOUND, RIGHT GLUTEAL FASCIOCUTANEOUS ADVANCEMENT FLAP AND RIGHT TENSOR FASCIAL JESSICA FLAP;  Surgeon: Amadeo Turner MD;  Location:  [facility];  Service: Plastics;  Laterality: Right;    LUMBAR DISCECTOMY Right 3/23/2021    Procedure: LUMBAR DISCECTOMY MICRO, Lumbar 1/2 right;  Surgeon: Bandar Shea MD;  Location:  [facility];  Service: Neurosurgery;  Laterality: Right;    LUMBAR FUSION N/A 1/19/2018    Procedure: L3-4,L4-5 DECOMPRESSION, POSTERIOR SPINAL FUSION WITH INSTRUMENTATION;  Surgeon: Fortino Oropeza MD;  Location:  [facility];  Service:     LUMBAR LAMINECTOMY WITH FUSION Left 1/17/2018    Procedure: LEFT L3-4 L4-5 LATERAL LUMBAR INTERBODY FUSION;  Surgeon: Fortino Oropeza MD;  Location:  [facility];  Service:     MASS EXCISION Right 4/23/2024    Procedure: RIGHT BUTTOCK MASS EXCISION;  Surgeon: Moises Keyes MD;  Location:  [facility];  Service: General;  Laterality: Right;    MYRINGOTOMY W/ TUBES  09/04/2014    TUBES NO LONGER IN PLACE    OTHER SURGICAL HISTORY      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 so hardware was removed and spacers were placed    REPLACEMENT TOTAL KNEE Right      PT Assessment (Last 12 Hours)       PT Evaluation and Treatment       Row Name 05/06/24 1057          Physical Therapy Time and Intention    Subjective Information no complaints  -KINGS     Document Type therapy note (daily note)  -KINGS     Mode of Treatment physical therapy  -KINGS       Row Name 05/06/24 1057          General Information    Existing Precautions/Restrictions fall  -KINGS       Row Name 05/06/24 1057          Pain    Pretreatment Pain Rating 0/10 - no pain  -KINGS     Posttreatment Pain Rating 0/10 - no pain  -KINGS       Row Name 05/06/24 1057          Bed Mobility    Bed Mobility sit-supine  -KINGS     Rolling Left  [place] (Bed Mobility) verbal cues;contact guard;minimum assist (75% patient effort)  -KINGS     Rolling Right [place] (Bed Mobility) verbal cues;contact guard;minimum assist (75% patient effort)  -KINGS     Supine-Sit [place] (Bed Mobility) verbal cues;minimum assist (75% patient effort)  -KINGS     Sit-Supine [place] (Bed Mobility) verbal cues;minimum assist (75% patient effort)  -KINGS     Comment, (Bed Mobility) pt sat EOB, stood several times, but then needed to lay down to be able to get pt fully cleaned up from BM  -KINGS       Row Name 05/06/24 1057          Transfers    Transfers stand-sit transfer  -KINGS     Comment, (Transfers) stood multiple times, pt leans to her right  -KINGS       Row Name 05/06/24 1057          Bed-Chair Transfer    Bed-Chair [place] (Transfers) verbal cues;minimum assist (75% patient effort);moderate assist (50% patient effort)  -KINGS     Assistive Device (Bed-Chair Transfers) walker, front-wheeled  -KINGS       Row Name 05/06/24 1057          Sit-Stand Transfer    Sit-Stand [place] (Transfers) verbal cues;minimum assist (75% patient effort);moderate assist (50% patient effort)  -KINGS     Assistive Device (Sit-Stand Transfers) walker, front-wheeled  -KINGS       Row Name 05/06/24 1057          Stand-Sit Transfer    Stand-Sit [place] (Transfers) verbal cues;minimum assist (75% patient effort)  -KINGS     Assistive Device (Stand-Sit Transfers) walker, front-wheeled  -KINGS       Row Name 05/06/24 1057          Gait/Stairs (Locomotion)    Comment, (Gait/Stairs) pt was able to take a few side steps towards HOB with RWX and mod assist  -KINGS       Row Name             Wound 04/23/24 0936 Right gluteal Incision    Wound - Properties Group Placement Date: 04/23/24  -EP Placement Time: 0936  -EP Present on Original Admission: N  -EP Side: Right  -EP Location: gluteal  -EP Primary Wound Type: Incision  -EP    Retired Wound - Properties Group Placement Date: 04/23/24  -EP Placement Time: 0936   -EP Present on Original Admission: N  -EP Side: Right  -EP Location: gluteal  -EP Primary Wound Type: Incision  -EP    Retired Wound - Properties Group Date first assessed: 04/23/24  -EP Time first assessed: 0936  -EP Present on Original Admission: N  -EP Side: Right  -EP Location: gluteal  -EP Primary Wound Type: Incision  -EP      Row Name 05/06/24 1057          Positioning and Restraints    Pre-Treatment Position in bed  -KINGS     Post Treatment Position chair  -KINGS     In Chair notified nsg;reclined;call light within reach;encouraged to call for assist  -KINGS               User Key  (r) = Recorded By, (t) = Taken By, (c) = Cosigned By      Initials Name Provider Type    KINGS Garcia Francis PTA Physical Therapist Assistant    Sonny Thompson, RN Registered Nurse                    Physical Therapy Education       Title: PT OT SLP Therapies (In Progress)       Topic: Physical Therapy (In Progress)       Point: Mobility training (Done)       Learning Progress Summary             Patient Acceptance, E, VU by MS at 5/5/2024 0910    Comment: role of PT in her care    Acceptance, E, VU by  at 5/2/2024 2240   Caregiver Acceptance, E, VU by  at 5/2/2024 2240                         Point: Home exercise program (Done)       Learning Progress Summary             Patient Acceptance, E, VU by  at 5/2/2024 2240   Caregiver Acceptance, E, VU by  at 5/2/2024 2240                         Point: Body mechanics (Not Started)       Learner Progress:  Not documented in this visit.              Point: Precautions (Done)       Learning Progress Summary             Patient Acceptance, E, VU by  at 5/2/2024 2240   Caregiver Acceptance, E, VU by  at 5/2/2024 2240                                         User Key       Initials Effective Dates Name Provider Type Discipline    MS 07/11/23 -  Nicole Olson, PT, DPT, NCS Physical Therapist PT     04/25/23 -  Rick Townsend, RN Registered Nurse Nurse                  PT  Recommendation and Plan     Plan of Care Reviewed With: patient  Progress: improving  Outcome Evaluation: Pt was in bed, wanting to get up into the chair.  Performed bed mobility with min assist.  Transfered sit to stand x 4 with RWX and min/mod assist.  Pt had BM and attempted to clean while standing, pt was unable to maintian standing for extended time, returned to supine and rolled left and right to be cleaned up with CGA.  Pt performed supine to sitting again with CGA.  Transfered sit to stand x 3 with RWX and min/mod assist.  Pt was able to take a few side steps to the left with RWX and mod assist.   Pt then transfered bed to chair with RWX and min assist.  Will continue to work with pt to increase strength, improve transfers, and progress with amb as pt is able.   Outcome Measures       Row Name 05/06/24 1057 05/03/24 1200          How much help from another person do you currently need...    Turning from your back to your side while in flat bed without using bedrails? 3  -KINGS --     Moving from lying on back to sitting on the side of a flat bed without bedrails? 3  -KINGS --     Moving to and from a bed to a chair (including a wheelchair)? 2  -KINGS --     Standing up from a chair using your arms (e.g., wheelchair, bedside chair)? 2  -KINGS --     Climbing 3-5 steps with a railing? 1  -KINGS --     To walk in hospital room? 1  -KINGS --     AM-PAC 6 Clicks Score (PT) 12  -KINGS --     Highest Level of Mobility Goal 4 --> Transfer to chair/commode  -KINGS --        How much help from another is currently needed...    Putting on and taking off regular lower body clothing? -- 1  -EC     Bathing (including washing, rinsing, and drying) -- 2  -EC     Toileting (which includes using toilet bed pan or urinal) -- 2  -EC     Putting on and taking off regular upper body clothing -- 3  -EC     Taking care of personal grooming (such as brushing teeth) -- 3  -EC     Eating meals -- 4  -EC     AM-PAC 6 Clicks Score (OT) -- 15  -EC         Functional Assessment    Outcome Measure Options AM-PAC 6 Clicks Basic Mobility (PT)  -KINGS AM-PAC 6 Clicks Daily Activity (OT)  -EC               User Key  (r) = Recorded By, (t) = Taken By, (c) = Cosigned By      Initials Name Provider Type    Garcia Jaramillo PTA Physical Therapist Assistant    EC Ashley Martino, OTR/L Occupational Therapist                     Time Calculation:    PT Charges       Row Name 05/06/24 1057             Time Calculation    Start Time 1057  -KINGS      Stop Time 1152  -KINGS      Time Calculation (min) 55 min  -KINGS      PT Received On 05/06/24  -KINGS         Time Calculation- PT    Total Timed Code Minutes- PT 55 minute(s)  -KINGS         Timed Charges    00957 - PT Therapeutic Activity Minutes 55  -KINGS         Total Minutes    Timed Charges Total Minutes 55  -KINGS       Total Minutes 55  -KINGS                User Key  (r) = Recorded By, (t) = Taken By, (c) = Cosigned By      Initials Name Provider Type    Garcia Jaramillo PTA Physical Therapist Assistant                  Therapy Charges for Today       Code Description Service Date Service Provider Modifiers Qty    [id number]  PT THERAPEUTIC ACT EA 15 MIN 5/6/2024 Garcia Francis PTA GP 4            PT G-Codes  Outcome Measure Options: AM-PAC 6 Clicks Basic Mobility (PT)  AM-PAC 6 Clicks Score (PT): 12  AM-PAC 6 Clicks Score (OT): 15    Garcia Francis PTA  5/6/2024

## 2024-05-06 NOTE — PLAN OF CARE
Goal Outcome Evaluation:  Plan of Care Reviewed With: patient        Progress: improving  Outcome Evaluation: See note                    Treatment Assessment (SLP): improved (05/06/24 0825)  Treatment Assessment Comments (SLP): Discharge (05/06/24 0825)  Plan for Continued Treatment (SLP): treatment no longer indicated as all goals met (05/06/24 0825)

## 2024-05-06 NOTE — THERAPY TREATMENT NOTE
Acute Care - Occupational Therapy Treatment Note  [facility]     Patient Name: Tawny Shin  : 1955  MRN: [id number]  Today's Date: 2024     Date of Referral to OT: 24       Admit Date: 2024       ICD-10-CM ICD-9-CM   1. Weakness  R53.1 780.79   2. Urinary tract infection without hematuria, site unspecified  N39.0 599.0   3. Dysphagia, unspecified type [R13.10]  R13.10 787.20   4. Generalized weakness [R53.1]  R53.1 780.79     Patient Active Problem List   Diagnosis    T12 compression fracture, initial encounter    Chronic embolism and thrombosis of unspecified deep veins of left lower extremity    Chronic anticoagulation    Iron deficiency anemia    Osteoporosis    E coli bacteremia    Epidural hematoma    Pleural effusion, left    Functional neurological symptom disorder with weakness or paralysis    Overweight (BMI 25.0-29.9)    Rheumatoid arthritis involving multiple sites    (HFpEF) heart failure with preserved ejection fraction    Venous insufficiency    Coronary artery disease involving native coronary artery of native heart without angina pectoris    Sick sinus syndrome    Essential hypertension    Pressure injury of skin of heel    Chronic pain    Anemia of chronic disease    Cholelithiasis    Pressure injury of skin of buttock    Near functional paraplegia    Skin cancer    Squamous cell carcinoma of back    Hematoma    Right-sided chest wall pain    Hyperlipidemia LDL goal <70    Malodorous urine    UTI (urinary tract infection)    Stage 3b chronic kidney disease    Cyst of buttocks    Sepsis due to Escherichia coli without acute organ dysfunction    Generalized weakness    Paralysis    History of urinary retention    Bacteremia due to Enterobacter species    Bacteremia     Past Medical History:   Diagnosis Date    Age-related osteoporosis with current pathological fracture 2020    Arthritis     Asthma     Bilateral bunions 2020    Cancer     Cardiac pacemaker  "syndrome 12/23/2020    Overview:  - heart block - implanted 11/16    Charcot's joint of foot, left 12/23/2020    Chronic deep vein thrombosis (DVT) of right lower extremity 06/23/2021    Chronic pain syndrome 06/22/2021    Chronic sinusitis     COPD (chronic obstructive pulmonary disease)     Coronary artery disease     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    Elevated cholesterol     Eustachian tube dysfunction     Heart disease     Herpes simplex     History of transfusion     Hyperlipidemia     Hypertension     Hypothyroidism 12/23/2020    Intrinsic asthma 12/23/2020    Knee dislocation     Labral tear of right hip joint     Laryngitis sicca     Laryngitis, chronic     Left carotid bruit 03/09/2016    MI (myocardial infarction)     Myalgia due to statin 06/25/2019    Open wound of right hip 09/14/2021    Osteomyelitis of right femur 07/06/2021    Otorrhea     Pacemaker 11/17/2016    Primary osteoarthritis of left knee 12/23/2020    Psoriasis vulgaris 12/23/2020    S/P coronary artery stent placement 03/09/2016    Sensorineural hearing loss     Seropositive rheumatoid arthritis of multiple sites 12/27/2019    Overview:  -myochrysine '93-'96 -methotrexate '96--->11/98;r/s  restarted 2/99--> 8/14 (anemia) -sulfasalazine- not effective -penicillamine 6/98-->10/98; no effect -leflunomide 11/98--> - Humira '13-->didn't take - Enbrel 12/14-->3/15- no effect!   Last Assessment & Plan:  - \"aching all over\" because she had to be off her anti-rheumatic drugs for 2 weeks in preparation for her R knee surgery - he    Sick sinus syndrome 12/27/2019    Sjogren's disease     Spondylolisthesis of lumbar region 01/17/2018    Syncope, recurrent 02/08/2021    Urinary tract infection      Past Surgical History:   Procedure Laterality Date    A-V CARDIAC PACEMAKER INSERTION  2016    ATRIAL CARDIAC PACEMAKER INSERTION      CARDIAC CATHETERIZATION      CATARACT EXTRACTION      CERVICAL CORPECTOMY N/A 3/3/2021    Procedure: " CERVICAL 6 CORPECTOMY WITH TITANIUM CAGE WITH NEURO MONITORING;  Surgeon: Bandar Shea MD;  Location:  [facility];  Service: Neurosurgery;  Laterality: N/A;    COLONOSCOPY  11/08/2011    One fold in the ascending colon which showed ulcer otherwise normal exam    COLONOSCOPY  11/12/2004    Normal exam repeat in five years    CORONARY ANGIOPLASTY WITH STENT PLACEMENT      X 2; 2013 & 2014    ENDOSCOPY  07/10/2014    Normal exam    FLAP LEG Right 9/14/2021    Procedure: RIGHT GLUTEAL FASCIOCUTANEOUS ADVANCEMENT FLAP AND RIGHT TENSOR FASCIAL JESSICA FLAP;  Surgeon: Amadeo Turner MD;  Location:  [facility];  Service: Plastics;  Laterality: Right;    HIP ABDUCTION TENOTOMY BILATERAL Right 1/14/2021    Procedure: RIGHT HIP GLUTEUS MEDLUS / MINIMUS REPAIR, POSSIBLE ACHILLES ALLOGRAFT;  Surgeon: Nino Carlson MD;  Location:  [facility];  Service: Orthopedics;  Laterality: Right;    INCISION AND DRAINAGE ABSCESS Right 6/4/2022    Procedure: INCISION AND DRAINAGE ABSCESS right hip;  Surgeon: Magda Salcido MD;  Location:  [facility];  Service: General;  Laterality: Right;    INCISION AND DRAINAGE ABSCESS Right 6/10/2022    Procedure: RIGHT HIP INCISION AND DRAINAGE. MD NEEDS 3L VANC IRRIGATION, CURRETTES, DAICANS, KERLEX ROLLS;  Surgeon: Amadeo Turner MD;  Location:  [facility];  Service: Plastics;  Laterality: Right;    INCISION AND DRAINAGE HIP Right 2/9/2021    Procedure: HIP INCISION AND DRAINAGE;  Surgeon: Nino Carlson MD;  Location:  [facility];  Service: Orthopedics;  Laterality: Right;    INCISION AND DRAINAGE LEG Right 10/24/2021    Procedure: INCISION AND DRAINAGE LOWER EXTREMITY;  Surgeon: Amadeo Turner MD;  Location:  [facility];  Service: Plastics;  Laterality: Right;    INCISION AND DRAINAGE OF WOUND Right 7/8/2021    Procedure: INCISION AND DRAINAGE WOUND RIGHT HIP;  Surgeon: James Huntley MD;  Location:  [facility];  Service: Orthopedics;  Laterality: Right;    JOINT REPLACEMENT       KYPHOPLASTY WITH BIOPSY Bilateral 10/26/2021    Procedure: THOARCIC 12 KYPHOPLASTY WITH BIOPSY;  Surgeon: Bandar Shea MD;  Location:  [facility];  Service: Neurosurgery;  Laterality: Bilateral;    LEG DEBRIDEMENT Right 9/14/2021    Procedure: DEBRIDEMENT OF RIGHT HIP WOUND, RIGHT GLUTEAL FASCIOCUTANEOUS ADVANCEMENT FLAP AND RIGHT TENSOR FASCIAL JESSICA FLAP;  Surgeon: Amadeo Turner MD;  Location:  [facility];  Service: Plastics;  Laterality: Right;    LUMBAR DISCECTOMY Right 3/23/2021    Procedure: LUMBAR DISCECTOMY MICRO, Lumbar 1/2 right;  Surgeon: Bandar Shea MD;  Location:  [facility];  Service: Neurosurgery;  Laterality: Right;    LUMBAR FUSION N/A 1/19/2018    Procedure: L3-4,L4-5 DECOMPRESSION, POSTERIOR SPINAL FUSION WITH INSTRUMENTATION;  Surgeon: Fortino Oropeza MD;  Location:  [facility];  Service:     LUMBAR LAMINECTOMY WITH FUSION Left 1/17/2018    Procedure: LEFT L3-4 L4-5 LATERAL LUMBAR INTERBODY FUSION;  Surgeon: Fortino Oropeza MD;  Location:  [facility];  Service:     MASS EXCISION Right 4/23/2024    Procedure: RIGHT BUTTOCK MASS EXCISION;  Surgeon: Moises Keyes MD;  Location:  [facility];  Service: General;  Laterality: Right;    MYRINGOTOMY W/ TUBES  09/04/2014    TUBES NO LONGER IN PLACE    OTHER SURGICAL HISTORY      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 so hardware was removed and spacers were placed    REPLACEMENT TOTAL KNEE Right          OT ASSESSMENT FLOWSHEET (Last 12 Hours)       OT Evaluation and Treatment       Row Name 05/06/24 1443 05/06/24 1139                OT Time and Intention    Subjective Information no complaints  -EC --       Document Type therapy note (daily note)  -EC --       Mode of Treatment occupational therapy  -EC --       Session Not Performed -- patient unavailable for treatment  -EC       Comment, Session Not Performed -- with PT  -EC          Pain Assessment    Pretreatment Pain Rating 0/10 - no pain  -EC --       Posttreatment Pain Rating 0/10 -  no pain  -EC --       Pain Intervention(s) Repositioned  -EC --          Activities of Daily Living    BADL Assessment/Intervention grooming  -EC --          Grooming Assessment/Training    [place] Level (Grooming) wash face, hands;set up  -EC --       Position (Grooming) supine  -EC --          Bed Mobility    Bed Mobility supine-sit;sit-supine  -EC --       Supine-Sit [place] (Bed Mobility) minimum assist (75% patient effort)  -EC --       Sit-Supine [place] (Bed Mobility) minimum assist (75% patient effort)  -EC --       Assistive Device (Bed Mobility) bed rails;head of bed elevated  -EC --          Functional Mobility    Functional Mobility- Ind. Level minimum assist (75% patient effort)  -EC --       Functional Mobility- Device walker, front-wheeled  -EC --       Functional Mobility- Comment 2 steps toward HOB  -EC --          Transfer Assessment/Treatment    Transfers sit-stand transfer;stand-sit transfer  -EC --          Sit-Stand Transfer    Sit-Stand [place] (Transfers) verbal cues;minimum assist (75% patient effort);moderate assist (50% patient effort)  -EC --       Assistive Device (Sit-Stand Transfers) walker, front-wheeled  -EC --          Stand-Sit Transfer    Stand-Sit [place] (Transfers) verbal cues;minimum assist (75% patient effort);moderate assist (50% patient effort)  -EC --       Assistive Device (Stand-Sit Transfers) walker, front-wheeled  -EC --          Motor Skills    Therapeutic Exercise shoulder;elbow/forearm  3 x 10 reps B shoulder press & bicep curls w 2 lb DBs  -EC --          Wound 04/23/24 0936 Right gluteal Incision    Wound - Properties Group Placement Date: 04/23/24  -EP Placement Time: 0936  -EP Present on Original Admission: N  -EP Side: Right  -EP Location: gluteal  -EP Primary Wound Type: Incision  -EP    Retired Wound - Properties Group Placement Date: 04/23/24  -EP Placement Time: 0936 -EP Present on Original Admission: N  -EP Side: Right  -EP  Location: gluteal  -EP Primary Wound Type: Incision  -EP    Retired Wound - Properties Group Date first assessed: 04/23/24  -EP Time first assessed: 0936  -EP Present on Original Admission: N  -EP Side: Right  -EP Location: gluteal  -EP Primary Wound Type: Incision  -EP       Plan of Care Review    Plan of Care Reviewed With patient;caregiver  -EC --       Progress improving  -EC --       Outcome Evaluation OT tx completed. Pt in fowlers with no complaints. Pt performed sup>sit w Jose R, improved bed mobility this date. Pt performed 3 x 10 reps B shoulder raises & bicep curls with 2 lb DBs. Pt stood twice & took steps toward the HOB w min-modA. Unable to stand long enough to take more than 1 step at a time. Pt returned to supine w Jose R, washed face with setup once in supine. Pt has made good progress, would benefit from cont OT. Anticipate d/c home w 24/7 assist.  -EC --          Vital Signs    Pretreatment Heart Rate (beats/min) 86  -EC --       Intratreatment Heart Rate (beats/min) 90  -EC --          Positioning and Restraints    Pre-Treatment Position in bed  -EC --       Post Treatment Position bed  -EC --       In Bed fowlers;call light within reach;encouraged to call for assist;exit alarm on;with family/caregiver;side rails up x2  -EC --                 User Key  (r) = Recorded By, (t) = Taken By, (c) = Cosigned By      Initials Name Effective Dates    EP Sonny Rivero RN 01/22/24 -     EC Ashley Martino OTR/L 10/13/23 -                      Occupational Therapy Education       Title: PT OT SLP Therapies (In Progress)       Topic: Occupational Therapy (Done)       Point: ADL training (Done)       Description:   Instruct learner(s) on proper safety adaptation and remediation techniques during self care or transfers.   Instruct in proper use of assistive devices.                  Learning Progress Summary             Patient Acceptance, BEBO VU by  at 5/3/2024 2717    Comment: role of OT, OT POC, body mechanics  w bed mobility   Family Acceptance, E, VU by EC at 5/3/2024 1245    Comment: role of OT, OT POC, body mechanics w bed mobility                         Point: Home exercise program (Done)       Description:   Instruct learner(s) on appropriate technique for monitoring, assisting and/or progressing therapeutic exercises/activities.                  Learning Progress Summary             Patient Acceptance, E, VU by EC at 5/6/2024 1527                         Point: Precautions (Done)       Description:   Instruct learner(s) on prescribed precautions during self-care and functional transfers.                  Learning Progress Summary             Patient Acceptance, E, VU by EC at 5/6/2024 1527    Acceptance, E, VU by EC at 5/3/2024 1245    Comment: role of OT, OT POC, body mechanics w bed mobility   Family Acceptance, E, VU by EC at 5/3/2024 1245    Comment: role of OT, OT POC, body mechanics w bed mobility                         Point: Body mechanics (Done)       Description:   Instruct learner(s) on proper positioning and spine alignment during self-care, functional mobility activities and/or exercises.                  Learning Progress Summary             Patient Acceptance, E, VU by EC at 5/6/2024 1527    Acceptance, E, VU by EC at 5/3/2024 1245    Comment: role of OT, OT POC, body mechanics w bed mobility   Family Acceptance, E, VU by EC at 5/3/2024 1245    Comment: role of OT, OT POC, body mechanics w bed mobility                                         User Key       Initials Effective Dates Name Provider Type Discipline     10/13/23 -  Ashley Martino OTR/L Occupational Therapist OT                      OT Recommendation and Plan  Planned Therapy Interventions (OT): activity tolerance training, adaptive equipment training, BADL retraining, functional balance retraining, occupation/activity based interventions, patient/caregiver education/training, ROM/therapeutic exercise, strengthening exercise,  transfer/mobility retraining  Therapy Frequency (OT): 5 times/wk  Plan of Care Review  Plan of Care Reviewed With: patient, caregiver  Progress: improving  Outcome Evaluation: OT tx completed. Pt in fowlers with no complaints. Pt performed sup>sit w Jose R, improved bed mobility this date. Pt performed 3 x 10 reps B shoulder raises & bicep curls with 2 lb DBs. Pt stood twice & took steps toward the HOB w min-modA. Unable to stand long enough to take more than 1 step at a time. Pt returned to supine w Jose R, washed face with setup once in supine. Pt has made good progress, would benefit from cont OT. Anticipate d/c home w 24/7 assist.  Plan of Care Reviewed With: patient, caregiver  Outcome Evaluation: OT tx completed. Pt in fowlers with no complaints. Pt performed sup>sit w Jose R, improved bed mobility this date. Pt performed 3 x 10 reps B shoulder raises & bicep curls with 2 lb DBs. Pt stood twice & took steps toward the HOB w min-modA. Unable to stand long enough to take more than 1 step at a time. Pt returned to supine w Jose R, washed face with setup once in supine. Pt has made good progress, would benefit from cont OT. Anticipate d/c home w 24/7 assist.     Outcome Measures       Row Name 05/06/24 1500 05/06/24 1057          How much help from another person do you currently need...    Turning from your back to your side while in flat bed without using bedrails? -- 3  -KINGS     Moving from lying on back to sitting on the side of a flat bed without bedrails? -- 3  -KINGS     Moving to and from a bed to a chair (including a wheelchair)? -- 2  -KINGS     Standing up from a chair using your arms (e.g., wheelchair, bedside chair)? -- 2  -KINGS     Climbing 3-5 steps with a railing? -- 1  -KINGS     To walk in hospital room? -- 1  -KINGS     AM-PAC 6 Clicks Score (PT) -- 12  -KINGS     Highest Level of Mobility Goal -- 4 --> Transfer to chair/commode  -KINGS        How much help from another is currently needed...    Putting on and taking off  regular lower body clothing? 2  -EC --     Bathing (including washing, rinsing, and drying) 2  -EC --     Toileting (which includes using toilet bed pan or urinal) 2  -EC --     Putting on and taking off regular upper body clothing 3  -EC --     Taking care of personal grooming (such as brushing teeth) 3  -EC --     Eating meals 4  -EC --     AM-PAC 6 Clicks Score (OT) 16  -EC --        Functional Assessment    Outcome Measure Options AM-PAC 6 Clicks Daily Activity (OT)  -EC AM-PAC 6 Clicks Basic Mobility (PT)  -KINGS               User Key  (r) = Recorded By, (t) = Taken By, (c) = Cosigned By      Initials Name Provider Type    Garcia Jaramillo, PTA Physical Therapist Assistant    EC Ashley Martino OTR/L Occupational Therapist                    Time Calculation:    Time Calculation- OT       Row Name 05/06/24 1508             Time Calculation- OT    OT Start Time 1443  -EC      OT Stop Time 1508  -EC      OT Time Calculation (min) 25 min  -EC      Total Timed Code Minutes- OT 25 minute(s)  -EC      OT Received On 05/06/24  -EC         Timed Charges    05674 - OT Therapeutic Exercise Minutes 15  -EC      16031 - OT Self Care/Mgmt Minutes 10  -EC         Total Minutes    Timed Charges Total Minutes 25  -EC       Total Minutes 25  -EC                User Key  (r) = Recorded By, (t) = Taken By, (c) = Cosigned By      Initials Name Provider Type    EC Ashley Martino, OTR/L Occupational Therapist                  Therapy Charges for Today       Code Description Service Date Service Provider Modifiers Qty    [id number]  OT THER PROC EA 15 MIN 5/6/2024 Ashley Martino, OTR/L GO 1    [id number]  OT SELF CARE/MGMT/TRAIN EA 15 MIN 5/6/2024 Ashley Martino, OTR/L GO 1                 Ashley Martino OTR/L  5/6/2024

## 2024-05-06 NOTE — PROGRESS NOTES
"INFECTIOUS DISEASES PROGRESS NOTE    Patient:  Tawny Shin  YOB: 1955  MRN: [id number]   Admit date: 2024   Admitting Physician: Saulo Ambriz MD  Primary Care Physician: Moises Oliveira MD    Chief Complaint: Still a little weak        Interval History: Patient overall feels better and states she is not quite back to baseline from the standpoint of getting around.    Allergies:   Allergies   Allergen Reactions    Atorvastatin Other (See Comments)     LEG CRAMPS      Amoxicillin Rash    Escitalopram Rash    Nabumetone Rash    Niacin Er Rash    Penicillin G Rash    Penicillins Rash    Simvastatin Rash       Current Scheduled Medications:   apixaban, 5 mg, Oral, BID  aspirin, 81 mg, Oral, Daily  carvedilol, 12.5 mg, Oral, BID With Meals  cefepime, 2,000 mg, Intravenous, Q8H  DULoxetine, 60 mg, Oral, Daily  folic acid, 1 mg, Oral, Daily  [Held by provider] furosemide, 40 mg, Oral, Daily  isosorbide mononitrate, 60 mg, Oral, Daily  leflunomide, 20 mg, Oral, Nightly  levothyroxine, 75 mcg, Oral, Q AM  [Held by provider] losartan, 50 mg, Oral, Daily  potassium chloride, 20 mEq, Oral, Daily  predniSONE, 5 mg, Oral, Daily  sodium chloride, 10 mL, Intravenous, Q12H      Current PRN Medications:    acetaminophen **OR** acetaminophen **OR** acetaminophen    senna-docusate sodium **AND** polyethylene glycol **AND** bisacodyl **AND** bisacodyl    calcium carbonate    Diclofenac Sodium    Lidocaine    magnesium hydroxide    nitroglycerin    ondansetron ODT **OR** ondansetron    [COMPLETED] Insert Peripheral IV **AND** sodium chloride    sodium chloride    sodium chloride    traMADol            Objective     Vital Signs:  Temp (24hrs), Av.2 °F (36.8 °C), Min:98 °F (36.7 °C), Max:98.3 °F (36.8 °C)      /68 (BP Location: Right arm, Patient Position: Lying)   Pulse 70   Temp 98.1 °F (36.7 °C) (Oral)   Resp 16   Ht 167.6 cm (66\")   Wt 95.7 kg (211 lb)   LMP  (LMP Unknown)   SpO2 93%   " BMI 34.06 kg/m²         Physical Exam:  General: Patient is sitting up in bed eating breakfast in no acute distress  Respiratory: Effort even and unlabored  Neuro: Alert, oriented, speech clear        Results Review    I reviewed the patient's new clinical results.    Lab Results:    CBC:   Lab Results   Lab 05/01/24  1344 05/02/24  1905 05/02/24  1905 05/03/24  0517 05/04/24  0518 05/05/24 0426 05/06/24  0445   WBC 6.84 8.03   < > 7.86 5.29 4.69 4.56   HEMOGLOBIN 10.4* 9.7*   < > 9.5* 8.6* 8.7* 8.5*   HEMATOCRIT 33.0* 31.6*   < > 31.1* 27.8* 28.0* 28.2*   PLATELETS 200 168  --  149 127* 132* 141    < > = values in this interval not displayed.        AutoDiff:   Lab Results   Lab 05/04/24 0518 05/05/24 0426 05/06/24 0445   NEUTROPHIL % 53.9 47.9 43.0   LYMPHOCYTE % 25.5 32.2 39.0   MONOCYTES % 16.4* 14.1* 12.1*   EOSINOPHIL % 2.5 3.6 3.5   BASOPHIL % 0.8 1.1 1.1   NEUTROS ABS 2.85 2.25 1.96   LYMPHS ABS 1.35 1.51 1.78   MONOS ABS 0.87 0.66 0.55   EOS ABS 0.13 0.17 0.16   BASOS ABS 0.04 0.05 0.05        Manual Diff:    Lab Results   Lab 05/04/24  0518 05/05/24  0426 05/06/24  0445   NEUTROS ABS 2.85 2.25 1.96           CMP:   Lab Results   Lab 05/01/24  1344 05/02/24  1905 05/03/24  0518 05/04/24  0518 05/05/24  0426 05/06/24  0445   SODIUM 138 139   < > 139 142 140   POTASSIUM 3.1* 3.5   < > 3.5 3.6 4.0   CHLORIDE 98 99   < > 104 109* 110*   CO2 24.4 29.0   < > 26.0 25.0 22.0   BUN 26* 29*   < > 32* 27* 18   CREATININE 1.58* 1.89*   < > 1.79* 1.31* 0.94   CALCIUM 9.3 9.3   < > 8.5* 8.6 8.8   BILIRUBIN 0.3 0.4  --   --   --   --    ALK PHOS 76 68  --   --   --   --    ALT (SGPT) 24 24  --   --   --   --    AST (SGOT) 21 26  --   --   --   --    GLUCOSE 175* 110*   < > 90 90 85    < > = values in this interval not displayed.       Estimated Creatinine Clearance: 65 mL/min (by C-G formula based on SCr of 0.94 mg/dL).    Culture Results:    Microbiology Results (last 10 days)       Procedure Component Value -  Date/Time    Blood Culture With BREONNA - Blood, Arm, Left [[id number]]  (Normal) Collected: 05/03/24 1738    Lab Status: Preliminary result Specimen: Blood from Arm, Left Updated: 05/05/24 1816     Blood Culture No growth at 2 days    Blood Culture - Blood, Hand, Left [[id number]]  (Abnormal) Collected: 05/02/24 2130    Lab Status: Preliminary result Specimen: Blood from Hand, Left Updated: 05/05/24 0603     Blood Culture Enterobacter cloacae complex     Isolated from Pediatric Bottle     Gram Stain Pediatric Bottle Gram negative bacilli    Narrative:      Sensi to follow    Blood Culture - Blood, Arm, Right [[id number]]  (Normal) Collected: 05/02/24 2130    Lab Status: Preliminary result Specimen: Blood from Arm, Right Updated: 05/05/24 2146     Blood Culture No growth at 3 days    Blood Culture ID, PCR - Blood, Hand, Left [[id number]]  (Abnormal) Collected: 05/02/24 2130    Lab Status: Final result Specimen: Blood from Hand, Left Updated: 05/03/24 1229     BCID, PCR Enterobacter cloacae complex. Identification by BCID2 PCR.     BOTTLE TYPE Pediatric Bottle    Narrative:      No resistance genes detected.    Urine Culture - Urine, Straight Cath [[id number]]  (Abnormal) Collected: 05/02/24 1956    Lab Status: Preliminary result Specimen: Urine from Straight Cath Updated: 05/05/24 1411     Urine Culture >100,000 CFU/mL Enterobacter cloacae complex CRE     Comment:   Consider infectious disease consult.  Susceptibility results may not correlate to clinical outcomes.  Carbapenem resistant Enterobacteriaceae, patient may be an isolation risk.       Narrative:      Colonization of the urinary tract without infection is common. Treatment is discouraged unless the patient is symptomatic, pregnant, or undergoing an invasive urologic procedure.    COVID-19, FLU A/B, RSV PCR 1 HR TAT - Swab, Nasopharynx [[id number]]  (Normal) Collected: 05/02/24 1826    Lab Status: Final result Specimen: Swab from Nasopharynx Updated: 05/02/24 1910      COVID19 Not Detected     Influenza A PCR Not Detected     Influenza B PCR Not Detected     RSV, PCR Not Detected    Narrative:      Fact sheet for providers: https://www.fda.gov/media/191156/download    Fact sheet for patients: https://www.fda.gov/media/942995/download    Test performed by PCR.                 Radiology:   Imaging Results (Last 72 Hours)       ** No results found for the last 72 hours. **                Active Hospital Problems    Diagnosis     **Bacteremia due to Enterobacter species     Generalized weakness     History of urinary retention     Stage 3b chronic kidney disease     UTI (urinary tract infection)     Anemia of chronic disease     Chronic anticoagulation        IMPRESSION:  Enterobacter cloacae bacteremia-LICHA remains pending.  Felt secondary to urinary tract infection  Enterobacter cloacae in urine-CRE reported May 5 awaiting further information.  Patient has clinically improved on cefepime  Recent admission with E. coli bacteremia April 19.  This was attributed to urinary tract infection (urine culture reported as mixed brett) patient does indicate that she felt like she was not completely emptying her bladder and had some urgency while she was at home between hospitalizations.  There was mention of urinary retention and her admission history and physical.  She has been bladder scanned once and reportedly there was no definite residual  Thrombocytopenia-appears resolved  History of prolonged MSSA bacteremia      RECOMMENDATION:   Continue cefepime-noted increased dose based on improved renal function  Will follow-up with [place] [facility] regarding urine culture susceptibilities and blood culture susceptibilities.  It is possible that the gene was detected to produce carbapenem resistance however is not expressed.  There are other possibilities for Carbapenem resistance as well.  Continue to increase activity.  Bladder scan after next void and consider In-N-Out cath to verify  patient has no retention-discussed with SARAH Long MD  05/06/24  08:18 CDT

## 2024-05-06 NOTE — PLAN OF CARE
Goal Outcome Evaluation:  Plan of Care Reviewed With: patient        Progress: improving  Outcome Evaluation: Pt was in bed, wanting to get up into the chair.  Performed bed mobility with min assist.  Transfered sit to stand x 4 with RWX and min/mod assist.  Pt had BM and attempted to clean while standing, pt was unable to maintian standing for extended time, returned to supine and rolled left and right to be cleaned up with CGA.  Pt performed supine to sitting again with CGA.  Transfered sit to stand x 3 with RWX and min/mod assist.  Pt was able to take a few side steps to the left with RWX and mod assist.   Pt then transfered bed to chair with RWX and min assist.  Will continue to work with pt to increase strength, improve transfers, and progress with amb as pt is able.

## 2024-05-06 NOTE — PROGRESS NOTES
"Pharmacy Dosing Service  Antimicrobial Dosing  Cefepime    Assessment/Action/Plan:  Based on indication and improved renal function, Cefepime dose adjusted to  2 gm IV every 8 hours via extended infusion. Pharmacy will continue to monitor daily and make further adjustment(s) accordingly.     Subjective:  Tawny Shin is a 68 y.o. female initiated on Cefepime 2 gm IV every 12 hours, for the treatment of UTI by prescriber, day 5 of 7 of treatment.    Objective:  Ht: 167.6 cm (66\"); Wt: 95.7 kg (211 lb)  Estimated Creatinine Clearance: 65 mL/min (by C-G formula based on SCr of 0.94 mg/dL).   Creatinine   Date Value Ref Range Status   05/06/2024 0.94 0.57 - 1.00 mg/dL Final   05/05/2024 1.31 (H) 0.57 - 1.00 mg/dL Final   05/04/2024 1.79 (H) 0.57 - 1.00 mg/dL Final   Final   Final   Final   Final      Lab Results   Component Value Date    WBC 4.56 05/06/2024    WBC 4.69 05/05/2024    WBC 5.29 05/04/2024      Baseline culture results:  Microbiology Results (last 10 days)       Procedure Component Value - Date/Time    Blood Culture With BREONNA - Blood, Arm, Left [[id number]]  (Normal) Collected: 05/03/24 1738    Lab Status: Preliminary result Specimen: Blood from Arm, Left Updated: 05/05/24 1816     Blood Culture No growth at 2 days    Blood Culture - Blood, Hand, Left [[id number]]  (Abnormal) Collected: 05/02/24 2130    Lab Status: Preliminary result Specimen: Blood from Hand, Left Updated: 05/05/24 0603     Blood Culture Enterobacter cloacae complex     Isolated from Pediatric Bottle     Gram Stain Pediatric Bottle Gram negative bacilli    Narrative:      Sensi to follow    Blood Culture - Blood, Arm, Right [[id number]]  (Normal) Collected: 05/02/24 2130    Lab Status: Preliminary result Specimen: Blood from Arm, Right Updated: 05/05/24 2146     Blood Culture No growth at 3 days    Blood Culture ID, PCR - Blood, Hand, Left [[id number]]  (Abnormal) Collected: 05/02/24 2130    Lab Status: Final result Specimen: Blood from " Hand, Left Updated: 05/03/24 1229     BCID, PCR Enterobacter cloacae complex. Identification by BCID2 PCR.     BOTTLE TYPE Pediatric Bottle    Narrative:      No resistance genes detected.    Urine Culture - Urine, Straight Cath [[id number]]  (Abnormal) Collected: 05/02/24 1956    Lab Status: Preliminary result Specimen: Urine from Straight Cath Updated: 05/05/24 1411     Urine Culture >100,000 CFU/mL Enterobacter cloacae complex CRE     Comment:   Consider infectious disease consult.  Susceptibility results may not correlate to clinical outcomes.  Carbapenem resistant Enterobacteriaceae, patient may be an isolation risk.       Narrative:      Colonization of the urinary tract without infection is common. Treatment is discouraged unless the patient is symptomatic, pregnant, or undergoing an invasive urologic procedure.    COVID-19, FLU A/B, RSV PCR 1 HR TAT - Swab, Nasopharynx [[id number]]  (Normal) Collected: 05/02/24 1826    Lab Status: Final result Specimen: Swab from Nasopharynx Updated: 05/02/24 1910     COVID19 Not Detected     Influenza A PCR Not Detected     Influenza B PCR Not Detected     RSV, PCR Not Detected    Narrative:      Fact sheet for providers: https://www.fda.gov/media/039064/download    Fact sheet for patients: https://www.fda.gov/media/740209/download    Test performed by PCR.            MARKIE Mobley PharmD  05/06/24 07:39 CDT

## 2024-05-06 NOTE — PLAN OF CARE
Goal Outcome Evaluation:  Plan of Care Reviewed With: patient        Progress: improving  Outcome Evaluation: Patient is A/O x 4. VSS. On room air. Tolerating IV ABT. Dressing to incision C/D/I. Caregiver at bedside throughout the night. Tramadol and Tums each given once during the night, with good results. No new concerns. Safety maintained.

## 2024-05-07 LAB
ANION GAP SERPL CALCULATED.3IONS-SCNC: 7 MMOL/L (ref 5–15)
BACTERIA SPEC AEROBE CULT: ABNORMAL
BACTERIA SPEC AEROBE CULT: NORMAL
BUN SERPL-MCNC: 15 MG/DL (ref 8–23)
BUN/CREAT SERPL: 16.5 (ref 7–25)
CALCIUM SPEC-SCNC: 8.9 MG/DL (ref 8.6–10.5)
CHLORIDE SERPL-SCNC: 110 MMOL/L (ref 98–107)
CHOLEST SERPL-MCNC: 207 MG/DL (ref 0–200)
CO2 SERPL-SCNC: 24 MMOL/L (ref 22–29)
CREAT SERPL-MCNC: 0.91 MG/DL (ref 0.57–1)
DEPRECATED RDW RBC AUTO: 59.6 FL (ref 37–54)
EGFRCR SERPLBLD CKD-EPI 2021: 68 ML/MIN/1.73
ERYTHROCYTE [DISTWIDTH] IN BLOOD BY AUTOMATED COUNT: 17 % (ref 12.3–15.4)
GLUCOSE SERPL-MCNC: 82 MG/DL (ref 65–99)
GRAM STN SPEC: ABNORMAL
HCT VFR BLD AUTO: 28.5 % (ref 34–46.6)
HDLC SERPL-MCNC: 26 MG/DL (ref 40–60)
HGB BLD-MCNC: 8.8 G/DL (ref 12–15.9)
HOLD SPECIMEN: NORMAL
ISOLATED FROM: ABNORMAL
LDLC SERPL CALC-MCNC: 133 MG/DL (ref 0–100)
LDLC/HDLC SERPL: 4.92 {RATIO}
MCH RBC QN AUTO: 29.9 PG (ref 26.6–33)
MCHC RBC AUTO-ENTMCNC: 30.9 G/DL (ref 31.5–35.7)
MCV RBC AUTO: 96.9 FL (ref 79–97)
PLATELET # BLD AUTO: 146 10*3/MM3 (ref 140–450)
PMV BLD AUTO: 11.8 FL (ref 6–12)
POTASSIUM SERPL-SCNC: 4.3 MMOL/L (ref 3.5–5.2)
QT INTERVAL: 400 MS
QTC INTERVAL: 432 MS
RBC # BLD AUTO: 2.94 10*6/MM3 (ref 3.77–5.28)
SODIUM SERPL-SCNC: 141 MMOL/L (ref 136–145)
T4 FREE SERPL-MCNC: 1.07 NG/DL (ref 0.93–1.7)
TRIGL SERPL-MCNC: 266 MG/DL (ref 0–150)
TSH SERPL DL<=0.05 MIU/L-ACNC: 8.87 UIU/ML (ref 0.27–4.2)
VLDLC SERPL-MCNC: 48 MG/DL (ref 5–40)
WBC NRBC COR # BLD AUTO: 5.01 10*3/MM3 (ref 3.4–10.8)

## 2024-05-07 PROCEDURE — 97110 THERAPEUTIC EXERCISES: CPT

## 2024-05-07 PROCEDURE — 80048 BASIC METABOLIC PNL TOTAL CA: CPT | Performed by: FAMILY MEDICINE

## 2024-05-07 PROCEDURE — 80061 LIPID PANEL: CPT | Performed by: FAMILY MEDICINE

## 2024-05-07 PROCEDURE — 99232 SBSQ HOSP IP/OBS MODERATE 35: CPT | Performed by: INTERNAL MEDICINE

## 2024-05-07 PROCEDURE — 84443 ASSAY THYROID STIM HORMONE: CPT | Performed by: FAMILY MEDICINE

## 2024-05-07 PROCEDURE — 85027 COMPLETE CBC AUTOMATED: CPT | Performed by: FAMILY MEDICINE

## 2024-05-07 PROCEDURE — 63710000001 PREDNISONE PER 5 MG: Performed by: NURSE PRACTITIONER

## 2024-05-07 PROCEDURE — 97535 SELF CARE MNGMENT TRAINING: CPT

## 2024-05-07 PROCEDURE — 25010000002 CEFEPIME PER 500 MG: Performed by: INTERNAL MEDICINE

## 2024-05-07 PROCEDURE — 97530 THERAPEUTIC ACTIVITIES: CPT

## 2024-05-07 PROCEDURE — 84439 ASSAY OF FREE THYROXINE: CPT | Performed by: FAMILY MEDICINE

## 2024-05-07 RX ORDER — LOSARTAN POTASSIUM 50 MG/1
50 TABLET ORAL ONCE
Status: COMPLETED | OUTPATIENT
Start: 2024-05-07 | End: 2024-05-07

## 2024-05-07 RX ORDER — LOSARTAN POTASSIUM 50 MG/1
100 TABLET ORAL DAILY
Status: DISCONTINUED | OUTPATIENT
Start: 2024-05-08 | End: 2024-05-09 | Stop reason: HOSPADM

## 2024-05-07 RX ADMIN — CEFEPIME 2000 MG: 2 INJECTION, POWDER, FOR SOLUTION INTRAVENOUS at 00:46

## 2024-05-07 RX ADMIN — Medication 10 ML: at 21:40

## 2024-05-07 RX ADMIN — CARVEDILOL 12.5 MG: 6.25 TABLET, FILM COATED ORAL at 17:30

## 2024-05-07 RX ADMIN — Medication 1 SPRAY: at 17:30

## 2024-05-07 RX ADMIN — LOSARTAN POTASSIUM 50 MG: 50 TABLET, FILM COATED ORAL at 12:56

## 2024-05-07 RX ADMIN — ISOSORBIDE MONONITRATE 60 MG: 60 TABLET, EXTENDED RELEASE ORAL at 09:01

## 2024-05-07 RX ADMIN — Medication 1 SPRAY: at 12:56

## 2024-05-07 RX ADMIN — PREDNISONE 5 MG: 5 TABLET ORAL at 09:01

## 2024-05-07 RX ADMIN — FOLIC ACID 1 MG: 1 TABLET ORAL at 09:01

## 2024-05-07 RX ADMIN — DIPHENHYDRAMINE HYDROCHLORIDE AND LIDOCAINE HYDROCHLORIDE AND ALUMINUM HYDROXIDE AND MAGNESIUM HYDRO 10 ML: KIT at 12:56

## 2024-05-07 RX ADMIN — APIXABAN 5 MG: 5 TABLET, FILM COATED ORAL at 21:40

## 2024-05-07 RX ADMIN — LEVOTHYROXINE SODIUM 75 MCG: 75 TABLET ORAL at 05:09

## 2024-05-07 RX ADMIN — CEFEPIME 2000 MG: 2 INJECTION, POWDER, FOR SOLUTION INTRAVENOUS at 09:06

## 2024-05-07 RX ADMIN — Medication 1 SPRAY: at 21:40

## 2024-05-07 RX ADMIN — CEFEPIME 2000 MG: 2 INJECTION, POWDER, FOR SOLUTION INTRAVENOUS at 17:30

## 2024-05-07 RX ADMIN — DULOXETINE HYDROCHLORIDE 60 MG: 30 CAPSULE, DELAYED RELEASE ORAL at 09:01

## 2024-05-07 RX ADMIN — Medication 10 ML: at 09:04

## 2024-05-07 RX ADMIN — APIXABAN 5 MG: 5 TABLET, FILM COATED ORAL at 09:01

## 2024-05-07 RX ADMIN — DIPHENHYDRAMINE HYDROCHLORIDE AND LIDOCAINE HYDROCHLORIDE AND ALUMINUM HYDROXIDE AND MAGNESIUM HYDRO 10 ML: KIT at 17:30

## 2024-05-07 RX ADMIN — CARVEDILOL 12.5 MG: 6.25 TABLET, FILM COATED ORAL at 09:00

## 2024-05-07 RX ADMIN — ASPIRIN 81 MG: 81 TABLET, COATED ORAL at 09:00

## 2024-05-07 RX ADMIN — Medication 1 SPRAY: at 09:01

## 2024-05-07 RX ADMIN — LOSARTAN POTASSIUM 50 MG: 50 TABLET, FILM COATED ORAL at 09:01

## 2024-05-07 RX ADMIN — LEFLUNOMIDE 20 MG: 20 TABLET ORAL at 21:40

## 2024-05-07 RX ADMIN — TRAMADOL HYDROCHLORIDE 50 MG: 50 TABLET ORAL at 21:40

## 2024-05-07 NOTE — PLAN OF CARE
Goal Outcome Evaluation: No complaints of pain this shift. IV antibiotics given as ordered. Patient to finish antibiotics Thursday and potentially be discharged when antibiotics are completed. Physical therapy and occupational therapy has been working with patient this shift. Patient sat up in bedside chair this shift. Patient safety to be maintained this shift, continue to monitor and report abnormal to provider.

## 2024-05-07 NOTE — PLAN OF CARE
Goal Outcome Evaluation:  Plan of Care Reviewed With: patient        Progress: improving  Outcome Evaluation: Patient A/O x 4. VSS. On room air. NSR on telemetry. Purewick in place. Tolerating IV ABT. No new concerns. Safety maintained.

## 2024-05-07 NOTE — PLAN OF CARE
Goal Outcome Evaluation:  Plan of Care Reviewed With: patient        Progress: improving  Outcome Evaluation: Pt was in bed, eager to get up.  Performed LE exercises AAROM.  Transfered supine to sitting with min assist.  Transfered sit to stand with min assist.  Pt improved with sit to stand today and able to maintain standing longer with RWX and CGA/min assist.  Pt took a few steps bed to chair with RWX and min assist.  Will continue to work with pt to increase strength and improve transfers.

## 2024-05-07 NOTE — PLAN OF CARE
Goal Outcome Evaluation:  Plan of Care Reviewed With: patient        Progress: improving  Outcome Evaluation: Pt very excited about likely discharge Thursday after last round of antibiotics. Pt in room with caregiver present. Pt happy to work with therapy. Pt transfers x4 occurances through tx and ambulates in room around bed while sitting for rest break senior living through ambulation. Pt and CONTI discussed UE/back strengthening exercises to complete in bed. Pt very motivated to get back home and continue with her rehab. Continue OT POC

## 2024-05-07 NOTE — THERAPY TREATMENT NOTE
Acute Care - Physical Therapy Treatment Note  [facility]     Patient Name: Tawny Shin  : 1955  MRN: [id number]  Today's Date: 2024      Visit Dx:     ICD-10-CM ICD-9-CM   1. Weakness  R53.1 780.79   2. Urinary tract infection without hematuria, site unspecified  N39.0 599.0   3. Dysphagia, unspecified type [R13.10]  R13.10 787.20   4. Generalized weakness [R53.1]  R53.1 780.79     Patient Active Problem List   Diagnosis    T12 compression fracture, initial encounter    Chronic embolism and thrombosis of unspecified deep veins of left lower extremity    Chronic anticoagulation    Iron deficiency anemia    Osteoporosis    E coli bacteremia    Epidural hematoma    Pleural effusion, left    Functional neurological symptom disorder with weakness or paralysis    Overweight (BMI 25.0-29.9)    Rheumatoid arthritis involving multiple sites    (HFpEF) heart failure with preserved ejection fraction    Venous insufficiency    Coronary artery disease involving native coronary artery of native heart without angina pectoris    Sick sinus syndrome    Essential hypertension    Pressure injury of skin of heel    Chronic pain    Anemia of chronic disease    Cholelithiasis    Pressure injury of skin of buttock    Near functional paraplegia    Skin cancer    Squamous cell carcinoma of back    Hematoma    Right-sided chest wall pain    Hyperlipidemia LDL goal <70    Malodorous urine    UTI (urinary tract infection)    Stage 3b chronic kidney disease    Cyst of buttocks    Sepsis due to Escherichia coli without acute organ dysfunction    Generalized weakness    Paralysis    History of urinary retention    Bacteremia due to Enterobacter species    Bacteremia     Past Medical History:   Diagnosis Date    Age-related osteoporosis with current pathological fracture 2020    Arthritis     Asthma     Bilateral bunions 2020    Cancer     Cardiac pacemaker syndrome 2020    Overview:  - heart block - implanted  "11/16    Charcot's joint of foot, left 12/23/2020    Chronic deep vein thrombosis (DVT) of right lower extremity 06/23/2021    Chronic pain syndrome 06/22/2021    Chronic sinusitis     COPD (chronic obstructive pulmonary disease)     Coronary artery disease     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    Elevated cholesterol     Eustachian tube dysfunction     Heart disease     Herpes simplex     History of transfusion     Hyperlipidemia     Hypertension     Hypothyroidism 12/23/2020    Intrinsic asthma 12/23/2020    Knee dislocation     Labral tear of right hip joint     Laryngitis sicca     Laryngitis, chronic     Left carotid bruit 03/09/2016    MI (myocardial infarction)     Myalgia due to statin 06/25/2019    Open wound of right hip 09/14/2021    Osteomyelitis of right femur 07/06/2021    Otorrhea     Pacemaker 11/17/2016    Primary osteoarthritis of left knee 12/23/2020    Psoriasis vulgaris 12/23/2020    S/P coronary artery stent placement 03/09/2016    Sensorineural hearing loss     Seropositive rheumatoid arthritis of multiple sites 12/27/2019    Overview:  -myochrysine '93-'96 -methotrexate '96--->11/98;r/s  restarted 2/99--> 8/14 (anemia) -sulfasalazine- not effective -penicillamine 6/98-->10/98; no effect -leflunomide 11/98--> - Humira '13-->didn't take - Enbrel 12/14-->3/15- no effect!   Last Assessment & Plan:  - \"aching all over\" because she had to be off her anti-rheumatic drugs for 2 weeks in preparation for her R knee surgery - he    Sick sinus syndrome 12/27/2019    Sjogren's disease     Spondylolisthesis of lumbar region 01/17/2018    Syncope, recurrent 02/08/2021    Urinary tract infection      Past Surgical History:   Procedure Laterality Date    A-V CARDIAC PACEMAKER INSERTION  2016    ATRIAL CARDIAC PACEMAKER INSERTION      CARDIAC CATHETERIZATION      CATARACT EXTRACTION      CERVICAL CORPECTOMY N/A 3/3/2021    Procedure: CERVICAL 6 CORPECTOMY WITH TITANIUM CAGE WITH NEURO MONITORING;  " Surgeon: Bandar Shea MD;  Location:  [facility];  Service: Neurosurgery;  Laterality: N/A;    COLONOSCOPY  11/08/2011    One fold in the ascending colon which showed ulcer otherwise normal exam    COLONOSCOPY  11/12/2004    Normal exam repeat in five years    CORONARY ANGIOPLASTY WITH STENT PLACEMENT      X 2; 2013 & 2014    ENDOSCOPY  07/10/2014    Normal exam    FLAP LEG Right 9/14/2021    Procedure: RIGHT GLUTEAL FASCIOCUTANEOUS ADVANCEMENT FLAP AND RIGHT TENSOR FASCIAL JESSICA FLAP;  Surgeon: Amadeo Turner MD;  Location:  [facility];  Service: Plastics;  Laterality: Right;    HIP ABDUCTION TENOTOMY BILATERAL Right 1/14/2021    Procedure: RIGHT HIP GLUTEUS MEDLUS / MINIMUS REPAIR, POSSIBLE ACHILLES ALLOGRAFT;  Surgeon: Nino Carlson MD;  Location:  [facility];  Service: Orthopedics;  Laterality: Right;    INCISION AND DRAINAGE ABSCESS Right 6/4/2022    Procedure: INCISION AND DRAINAGE ABSCESS right hip;  Surgeon: Magda Salcido MD;  Location:  [facility];  Service: General;  Laterality: Right;    INCISION AND DRAINAGE ABSCESS Right 6/10/2022    Procedure: RIGHT HIP INCISION AND DRAINAGE. MD NEEDS 3L VANC IRRIGATION, CURRETTES, DAICANS, KERLEX ROLLS;  Surgeon: Amadeo Turner MD;  Location:  [facility];  Service: Plastics;  Laterality: Right;    INCISION AND DRAINAGE HIP Right 2/9/2021    Procedure: HIP INCISION AND DRAINAGE;  Surgeon: Nino Carlson MD;  Location:  [facility];  Service: Orthopedics;  Laterality: Right;    INCISION AND DRAINAGE LEG Right 10/24/2021    Procedure: INCISION AND DRAINAGE LOWER EXTREMITY;  Surgeon: Amadeo Turner MD;  Location:  [facility];  Service: Plastics;  Laterality: Right;    INCISION AND DRAINAGE OF WOUND Right 7/8/2021    Procedure: INCISION AND DRAINAGE WOUND RIGHT HIP;  Surgeon: James Huntley MD;  Location:  [facility];  Service: Orthopedics;  Laterality: Right;    JOINT REPLACEMENT      KYPHOPLASTY WITH BIOPSY Bilateral 10/26/2021    Procedure: THOARCIC 12  KYPHOPLASTY WITH BIOPSY;  Surgeon: Bandar Shea MD;  Location:  [facility];  Service: Neurosurgery;  Laterality: Bilateral;    LEG DEBRIDEMENT Right 9/14/2021    Procedure: DEBRIDEMENT OF RIGHT HIP WOUND, RIGHT GLUTEAL FASCIOCUTANEOUS ADVANCEMENT FLAP AND RIGHT TENSOR FASCIAL JESSICA FLAP;  Surgeon: Amadeo Turner MD;  Location:  [facility];  Service: Plastics;  Laterality: Right;    LUMBAR DISCECTOMY Right 3/23/2021    Procedure: LUMBAR DISCECTOMY MICRO, Lumbar 1/2 right;  Surgeon: Bandar Shea MD;  Location:  [facility];  Service: Neurosurgery;  Laterality: Right;    LUMBAR FUSION N/A 1/19/2018    Procedure: L3-4,L4-5 DECOMPRESSION, POSTERIOR SPINAL FUSION WITH INSTRUMENTATION;  Surgeon: Fortino Oropeza MD;  Location:  [facility];  Service:     LUMBAR LAMINECTOMY WITH FUSION Left 1/17/2018    Procedure: LEFT L3-4 L4-5 LATERAL LUMBAR INTERBODY FUSION;  Surgeon: Fortino Oropeza MD;  Location:  [facility];  Service:     MASS EXCISION Right 4/23/2024    Procedure: RIGHT BUTTOCK MASS EXCISION;  Surgeon: Moises Keyes MD;  Location:  [facility];  Service: General;  Laterality: Right;    MYRINGOTOMY W/ TUBES  09/04/2014    TUBES NO LONGER IN PLACE    OTHER SURGICAL HISTORY      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 so hardware was removed and spacers were placed    REPLACEMENT TOTAL KNEE Right      PT Assessment (Last 12 Hours)       PT Evaluation and Treatment       Row Name 05/07/24 0924          Physical Therapy Time and Intention    Subjective Information complains of;weakness;fatigue  -KINGS     Document Type therapy note (daily note)  -KINGS     Mode of Treatment physical therapy  -KINGS       Row Name 05/07/24 0924          General Information    Existing Precautions/Restrictions fall  -KINGS       Row Name 05/07/24 0924          Pain    Pretreatment Pain Rating 0/10 - no pain  -KINGS     Posttreatment Pain Rating 0/10 - no pain  -KINGS       Row Name 05/07/24 0924          Bed Mobility    Supine-Sit [place] (Bed  Mobility) verbal cues;minimum assist (75% patient effort)  -KINGS       Row Name 05/07/24 0924          Transfers    Comment, (Transfers) practiced sit to stand x 4, focused on maintaining standing  -KINGS       Row Name 05/07/24 0924          Sit-Stand Transfer    Sit-Stand [place] (Transfers) verbal cues;minimum assist (75% patient effort)  -KINGS     Assistive Device (Sit-Stand Transfers) walker, front-wheeled  -KINGS       Row Name 05/07/24 0924          Stand-Sit Transfer    Stand-Sit [place] (Transfers) verbal cues;minimum assist (75% patient effort)  -KINGS     Assistive Device (Stand-Sit Transfers) walker, front-wheeled  -KINGS       Row Name 05/07/24 0924          Gait/Stairs (Locomotion)    Comment, (Gait/Stairs) pt was able to take steps bed to chair with RWX and min assist.  -KINGS       Row Name 05/07/24 0924          Motor Skills    Comments, Therapeutic Exercise in BEV acosta BLE X 20  -KINGS     Additional Documentation Comments, Therapeutic Exercise (Row)  -KINGS       Row Name             Wound 04/23/24 0936 Right gluteal Incision    Wound - Properties Group Placement Date: 04/23/24  -EP Placement Time: 0936  -EP Present on Original Admission: N  -EP Side: Right  -EP Location: gluteal  -EP Primary Wound Type: Incision  -EP    Retired Wound - Properties Group Placement Date: 04/23/24  -EP Placement Time: 0936  -EP Present on Original Admission: N  -EP Side: Right  -EP Location: gluteal  -EP Primary Wound Type: Incision  -EP    Retired Wound - Properties Group Date first assessed: 04/23/24  -EP Time first assessed: 0936  -EP Present on Original Admission: N  -EP Side: Right  -EP Location: gluteal  -EP Primary Wound Type: Incision  -EP      Row Name 05/07/24 0924          Positioning and Restraints    Pre-Treatment Position in bed  -KINGS     Post Treatment Position chair  -KINGS     In Chair reclined;call light within reach;encouraged to call for assist;with family/caregiver  -KINGS               User Key  (r) = Recorded  By, (t) = Taken By, (c) = Cosigned By      Initials Name Provider Type    KINGS Garcia Francis, PTA Physical Therapist Assistant    Sonny Thompson, RN Registered Nurse                    Physical Therapy Education       Title: PT OT SLP Therapies (In Progress)       Topic: Physical Therapy (In Progress)       Point: Mobility training (Done)       Learning Progress Summary             Patient Acceptance, E, VU by MS at 5/5/2024 0910    Comment: role of PT in her care    Acceptance, E, VU by  at 5/2/2024 2240   Caregiver Acceptance, E, VU by  at 5/2/2024 2240                         Point: Home exercise program (Done)       Learning Progress Summary             Patient Acceptance, E, VU by  at 5/2/2024 2240   Caregiver Acceptance, E, VU by  at 5/2/2024 2240                         Point: Body mechanics (Not Started)       Learner Progress:  Not documented in this visit.              Point: Precautions (Done)       Learning Progress Summary             Patient Acceptance, E, VU by  at 5/2/2024 2240   Caregiver Acceptance, E, VU by  at 5/2/2024 2240                                         User Key       Initials Effective Dates Name Provider Type Discipline    MS 07/11/23 -  Nicole Olson, PT, DPT, NCS Physical Therapist PT     04/25/23 -  Rick Townsend, RN Registered Nurse Nurse                  PT Recommendation and Plan     Plan of Care Reviewed With: patient  Progress: improving  Outcome Evaluation: Pt was in bed, eager to get up.  Performed LE exercises AAROM.  Transfered supine to sitting with min assist.  Transfered sit to stand with min assist.  Pt improved with sit to stand today and able to maintain standing longer with RWX and CGA/min assist.  Pt took a few steps bed to chair with RWX and min assist.  Will continue to work with pt to increase strength and improve transfers.   Outcome Measures       Row Name 05/07/24 0924 05/06/24 1500 05/06/24 1057       How much help from another person do you  currently need...    Turning from your back to your side while in flat bed without using bedrails? 3  -KINGS -- 3  -KINGS    Moving from lying on back to sitting on the side of a flat bed without bedrails? 3  -KINGS -- 3  -KINGS    Moving to and from a bed to a chair (including a wheelchair)? 2  -KINGS -- 2  -KINGS    Standing up from a chair using your arms (e.g., wheelchair, bedside chair)? 2  -KINGS -- 2  -KINGS    Climbing 3-5 steps with a railing? 1  -KINGS -- 1  -KINGS    To walk in hospital room? 1  -KINGS -- 1  -KINGS    AM-PAC 6 Clicks Score (PT) 12  -KINGS -- 12  -KINGS    Highest Level of Mobility Goal 4 --> Transfer to chair/commode  -KINGS -- 4 --> Transfer to chair/commode  -KINGS       How much help from another is currently needed...    Putting on and taking off regular lower body clothing? -- 2  -EC --    Bathing (including washing, rinsing, and drying) -- 2  -EC --    Toileting (which includes using toilet bed pan or urinal) -- 2  -EC --    Putting on and taking off regular upper body clothing -- 3  -EC --    Taking care of personal grooming (such as brushing teeth) -- 3  -EC --    Eating meals -- 4  -EC --    AM-PAC 6 Clicks Score (OT) -- 16  -EC --       Functional Assessment    Outcome Measure Options [facility] 6 Clicks Basic Mobility (PT)  -KINGS [facility] 6 Clicks Daily Activity (OT)  -EC [facility] 6 Clicks Basic Mobility (PT)  -              User Key  (r) = Recorded By, (t) = Taken By, (c) = Cosigned By      Initials Name Provider Type    KINGS Garcia Francis, PTA Physical Therapist Assistant    EC Ashley Martino OTR/L Occupational Therapist                     Time Calculation:    PT Charges       Row Name 05/07/24 0924             Time Calculation    Start Time 0924  -KINGS      Stop Time 1004  -KINGS      Time Calculation (min) 40 min  -KINGS      PT Received On 05/07/24  -         Time Calculation- PT    Total Timed Code Minutes- PT 40 minute(s)  -KINGS         Timed Charges    57027 - PT Therapeutic Exercise Minutes 24  -KINGS      14231 - PT Therapeutic  Activity Minutes 16  -KINGS         Total Minutes    Timed Charges Total Minutes 40  -KINGS       Total Minutes 40  -KINGS                User Key  (r) = Recorded By, (t) = Taken By, (c) = Cosigned By      Initials Name Provider Type    Garcia Jaramillo PTA Physical Therapist Assistant                  Therapy Charges for Today       Code Description Service Date Service Provider Modifiers Qty    63942320028 HC PT THERAPEUTIC ACT EA 15 MIN 5/6/2024 Garcia Francis, SERENA GP 4    14203648460 HC PT THERAPEUTIC ACT EA 15 MIN 5/7/2024 Garcia Francis, SERENA GP 1    65593925797 HC PT THER PROC EA 15 MIN 5/7/2024 Garcia Francis, SERENA GP 2            PT G-Codes  Outcome Measure Options: AM-PAC 6 Clicks Basic Mobility (PT)  AM-PAC 6 Clicks Score (PT): 12  AM-PAC 6 Clicks Score (OT): 16    Garcia Francis PTA  5/7/2024

## 2024-05-07 NOTE — THERAPY TREATMENT NOTE
Acute Care - Occupational Therapy Treatment Note  [facility]     Patient Name: Tawny Shin  : 1955  MRN: [id number]  Today's Date: 2024     Date of Referral to OT: 24       Admit Date: 2024       ICD-10-CM ICD-9-CM   1. Weakness  R53.1 780.79   2. Urinary tract infection without hematuria, site unspecified  N39.0 599.0   3. Dysphagia, unspecified type [R13.10]  R13.10 787.20   4. Generalized weakness [R53.1]  R53.1 780.79     Patient Active Problem List   Diagnosis    T12 compression fracture, initial encounter    Chronic embolism and thrombosis of unspecified deep veins of left lower extremity    Chronic anticoagulation    Iron deficiency anemia    Osteoporosis    E coli bacteremia    Epidural hematoma    Pleural effusion, left    Functional neurological symptom disorder with weakness or paralysis    Overweight (BMI 25.0-29.9)    Rheumatoid arthritis involving multiple sites    (HFpEF) heart failure with preserved ejection fraction    Venous insufficiency    Coronary artery disease involving native coronary artery of native heart without angina pectoris    Sick sinus syndrome    Essential hypertension    Pressure injury of skin of heel    Chronic pain    Anemia of chronic disease    Cholelithiasis    Pressure injury of skin of buttock    Near functional paraplegia    Skin cancer    Squamous cell carcinoma of back    Hematoma    Right-sided chest wall pain    Hyperlipidemia LDL goal <70    Malodorous urine    UTI (urinary tract infection)    Stage 3b chronic kidney disease    Cyst of buttocks    Sepsis due to Escherichia coli without acute organ dysfunction    Generalized weakness    Paralysis    History of urinary retention    Bacteremia due to Enterobacter species    Bacteremia     Past Medical History:   Diagnosis Date    Age-related osteoporosis with current pathological fracture 2020    Arthritis     Asthma     Bilateral bunions 2020    Cancer     Cardiac pacemaker  "syndrome 12/23/2020    Overview:  - heart block - implanted 11/16    Charcot's joint of foot, left 12/23/2020    Chronic deep vein thrombosis (DVT) of right lower extremity 06/23/2021    Chronic pain syndrome 06/22/2021    Chronic sinusitis     COPD (chronic obstructive pulmonary disease)     Coronary artery disease     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    Elevated cholesterol     Eustachian tube dysfunction     Heart disease     Herpes simplex     History of transfusion     Hyperlipidemia     Hypertension     Hypothyroidism 12/23/2020    Intrinsic asthma 12/23/2020    Knee dislocation     Labral tear of right hip joint     Laryngitis sicca     Laryngitis, chronic     Left carotid bruit 03/09/2016    MI (myocardial infarction)     Myalgia due to statin 06/25/2019    Open wound of right hip 09/14/2021    Osteomyelitis of right femur 07/06/2021    Otorrhea     Pacemaker 11/17/2016    Primary osteoarthritis of left knee 12/23/2020    Psoriasis vulgaris 12/23/2020    S/P coronary artery stent placement 03/09/2016    Sensorineural hearing loss     Seropositive rheumatoid arthritis of multiple sites 12/27/2019    Overview:  -myochrysine '93-'96 -methotrexate '96--->11/98;r/s  restarted 2/99--> 8/14 (anemia) -sulfasalazine- not effective -penicillamine 6/98-->10/98; no effect -leflunomide 11/98--> - Humira '13-->didn't take - Enbrel 12/14-->3/15- no effect!   Last Assessment & Plan:  - \"aching all over\" because she had to be off her anti-rheumatic drugs for 2 weeks in preparation for her R knee surgery - he    Sick sinus syndrome 12/27/2019    Sjogren's disease     Spondylolisthesis of lumbar region 01/17/2018    Syncope, recurrent 02/08/2021    Urinary tract infection      Past Surgical History:   Procedure Laterality Date    A-V CARDIAC PACEMAKER INSERTION  2016    ATRIAL CARDIAC PACEMAKER INSERTION      CARDIAC CATHETERIZATION      CATARACT EXTRACTION      CERVICAL CORPECTOMY N/A 3/3/2021    Procedure: " CERVICAL 6 CORPECTOMY WITH TITANIUM CAGE WITH NEURO MONITORING;  Surgeon: Bandar Shea MD;  Location:  [facility];  Service: Neurosurgery;  Laterality: N/A;    COLONOSCOPY  11/08/2011    One fold in the ascending colon which showed ulcer otherwise normal exam    COLONOSCOPY  11/12/2004    Normal exam repeat in five years    CORONARY ANGIOPLASTY WITH STENT PLACEMENT      X 2; 2013 & 2014    ENDOSCOPY  07/10/2014    Normal exam    FLAP LEG Right 9/14/2021    Procedure: RIGHT GLUTEAL FASCIOCUTANEOUS ADVANCEMENT FLAP AND RIGHT TENSOR FASCIAL JESSICA FLAP;  Surgeon: Amadeo Turner MD;  Location:  [facility];  Service: Plastics;  Laterality: Right;    HIP ABDUCTION TENOTOMY BILATERAL Right 1/14/2021    Procedure: RIGHT HIP GLUTEUS MEDLUS / MINIMUS REPAIR, POSSIBLE ACHILLES ALLOGRAFT;  Surgeon: Nino Carlson MD;  Location:  [facility];  Service: Orthopedics;  Laterality: Right;    INCISION AND DRAINAGE ABSCESS Right 6/4/2022    Procedure: INCISION AND DRAINAGE ABSCESS right hip;  Surgeon: Magda Salcido MD;  Location:  [facility];  Service: General;  Laterality: Right;    INCISION AND DRAINAGE ABSCESS Right 6/10/2022    Procedure: RIGHT HIP INCISION AND DRAINAGE. MD NEEDS 3L VANC IRRIGATION, CURRETTES, DAICANS, KERLEX ROLLS;  Surgeon: Amadeo Turner MD;  Location:  [facility];  Service: Plastics;  Laterality: Right;    INCISION AND DRAINAGE HIP Right 2/9/2021    Procedure: HIP INCISION AND DRAINAGE;  Surgeon: Nino Carlson MD;  Location:  [facility];  Service: Orthopedics;  Laterality: Right;    INCISION AND DRAINAGE LEG Right 10/24/2021    Procedure: INCISION AND DRAINAGE LOWER EXTREMITY;  Surgeon: Amadeo Turner MD;  Location:  [facility];  Service: Plastics;  Laterality: Right;    INCISION AND DRAINAGE OF WOUND Right 7/8/2021    Procedure: INCISION AND DRAINAGE WOUND RIGHT HIP;  Surgeon: James Huntley MD;  Location:  [facility];  Service: Orthopedics;  Laterality: Right;    JOINT REPLACEMENT       KYPHOPLASTY WITH BIOPSY Bilateral 10/26/2021    Procedure: THOARCIC 12 KYPHOPLASTY WITH BIOPSY;  Surgeon: Bandar Shea MD;  Location:  [facility];  Service: Neurosurgery;  Laterality: Bilateral;    LEG DEBRIDEMENT Right 9/14/2021    Procedure: DEBRIDEMENT OF RIGHT HIP WOUND, RIGHT GLUTEAL FASCIOCUTANEOUS ADVANCEMENT FLAP AND RIGHT TENSOR FASCIAL JESSICA FLAP;  Surgeon: Amadeo Turner MD;  Location:  [facility];  Service: Plastics;  Laterality: Right;    LUMBAR DISCECTOMY Right 3/23/2021    Procedure: LUMBAR DISCECTOMY MICRO, Lumbar 1/2 right;  Surgeon: Bandar Shea MD;  Location:  [facility];  Service: Neurosurgery;  Laterality: Right;    LUMBAR FUSION N/A 1/19/2018    Procedure: L3-4,L4-5 DECOMPRESSION, POSTERIOR SPINAL FUSION WITH INSTRUMENTATION;  Surgeon: Fortino Oropeza MD;  Location:  [facility];  Service:     LUMBAR LAMINECTOMY WITH FUSION Left 1/17/2018    Procedure: LEFT L3-4 L4-5 LATERAL LUMBAR INTERBODY FUSION;  Surgeon: Fortino Oropeza MD;  Location:  [facility];  Service:     MASS EXCISION Right 4/23/2024    Procedure: RIGHT BUTTOCK MASS EXCISION;  Surgeon: Moises Keyes MD;  Location:  [facility];  Service: General;  Laterality: Right;    MYRINGOTOMY W/ TUBES  09/04/2014    TUBES NO LONGER IN PLACE    OTHER SURGICAL HISTORY      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 so hardware was removed and spacers were placed    REPLACEMENT TOTAL KNEE Right          OT ASSESSMENT FLOWSHEET (Last 12 Hours)       OT Evaluation and Treatment       Row Name 05/07/24 1300 05/07/24 1109                OT Time and Intention    Subjective Information no complaints  -TS --       Document Type therapy note (daily note)  -TS --  -TS       Mode of Treatment occupational therapy  -TS occupational therapy  -TS       Comment, Session Not Performed -- pt requested to sit up for lunch and check back after  -TS       Patient Effort good  -TS --          General Information    Existing Precautions/Restrictions fall  -TS --           Pain Assessment    Pretreatment Pain Rating 0/10 - no pain  -TS --       Posttreatment Pain Rating 0/10 - no pain  -TS --          Cognition    Personal Safety Interventions fall prevention program maintained;gait belt;nonskid shoes/slippers when out of bed  -TS --          Bed Mobility    Bed Mobility scooting/bridging  -TS --       Scooting/Bridging Faulk (Bed Mobility) moderate assist (50% patient effort)  -TS --       Sit-Supine Faulk (Bed Mobility) minimum assist (75% patient effort)  -TS --       Assistive Device (Bed Mobility) bed rails;draw sheet  -TS --          Functional Mobility    Functional Mobility- Ind. Level contact guard assist  -TS --       Functional Mobility- Device walker, front-wheeled  -TS --       Functional Mobility- Comment in room, around bed  -TS --          Transfer Assessment/Treatment    Transfers sit-stand transfer;stand-sit transfer  -TS --          Sit-Stand Transfer    Sit-Stand Faulk (Transfers) contact guard  -TS --       Assistive Device (Sit-Stand Transfers) walker, front-wheeled  -TS --          Stand-Sit Transfer    Stand-Sit Faulk (Transfers) contact guard  -TS --       Assistive Device (Stand-Sit Transfers) walker, front-wheeled  -TS --          Wound 04/23/24 0936 Right gluteal Incision    Wound - Properties Group Placement Date: 04/23/24  -EP Placement Time: 0936  -EP Present on Original Admission: N  -EP Side: Right  -EP Location: gluteal  -EP Primary Wound Type: Incision  -EP    Retired Wound - Properties Group Placement Date: 04/23/24  -EP Placement Time: 0936  -EP Present on Original Admission: N  -EP Side: Right  -EP Location: gluteal  -EP Primary Wound Type: Incision  -EP    Retired Wound - Properties Group Date first assessed: 04/23/24  -EP Time first assessed: 0936  -EP Present on Original Admission: N  -EP Side: Right  -EP Location: gluteal  -EP Primary Wound Type: Incision  -EP       Plan of Care Review    Plan of Care  Reviewed With patient  -TS --       Progress improving  -TS --       Outcome Evaluation Pt very excited about likely discharge Thursday after last round of antibiotics. Pt in room with caregiver present. Pt happy to work with therapy. Pt transfers x4 occurances through tx and ambulates in room around bed while sitting for rest break custodial through ambulation. Pt and CONTI discussed UE/back strengthening exercises to complete in bed. Pt very motivated to get back home and continue with her rehab. Continue OT POC  -TS --          Positioning and Restraints    Pre-Treatment Position sitting in chair/recliner  -TS --       Post Treatment Position bed  -TS --       In Bed fowlers;call light within reach;encouraged to call for assist;with family/caregiver;side rails up x2  -TS --                 User Key  (r) = Recorded By, (t) = Taken By, (c) = Cosigned By      Initials Name Effective Dates    TS Carolee Giordano CONTI 02/03/23 -     Sonny Thompson RN 01/22/24 -                      Occupational Therapy Education       Title: PT OT SLP Therapies (In Progress)       Topic: Occupational Therapy (Done)       Point: ADL training (Done)       Description:   Instruct learner(s) on proper safety adaptation and remediation techniques during self care or transfers.   Instruct in proper use of assistive devices.                  Learning Progress Summary             Patient Acceptance, E, VU by EC at 5/3/2024 1245    Comment: role of OT, OT POC, body mechanics w bed mobility   Family Acceptance, E, VU by EC at 5/3/2024 1245    Comment: role of OT, OT POC, body mechanics w bed mobility                         Point: Home exercise program (Done)       Description:   Instruct learner(s) on appropriate technique for monitoring, assisting and/or progressing therapeutic exercises/activities.                  Learning Progress Summary             Patient Acceptance, E, VU by EC at 5/6/2024 1527                         Point:  Precautions (Done)       Description:   Instruct learner(s) on prescribed precautions during self-care and functional transfers.                  Learning Progress Summary             Patient Acceptance, E, VU by EC at 5/6/2024 1527    Acceptance, E, VU by EC at 5/3/2024 1245    Comment: role of OT, OT POC, body mechanics w bed mobility   Family Acceptance, E, VU by EC at 5/3/2024 1245    Comment: role of OT, OT POC, body mechanics w bed mobility                         Point: Body mechanics (Done)       Description:   Instruct learner(s) on proper positioning and spine alignment during self-care, functional mobility activities and/or exercises.                  Learning Progress Summary             Patient Acceptance, E, VU by EC at 5/6/2024 1527    Acceptance, E, VU by EC at 5/3/2024 1245    Comment: role of OT, OT POC, body mechanics w bed mobility   Family Acceptance, E, VU by EC at 5/3/2024 1245    Comment: role of OT, OT POC, body mechanics w bed mobility                                         User Key       Initials Effective Dates Name Provider Type Discipline     10/13/23 -  Ashley Martino, OTR/L Occupational Therapist OT                      OT Recommendation and Plan     Plan of Care Review  Plan of Care Reviewed With: patient  Progress: improving  Outcome Evaluation: Pt very excited about likely discharge Thursday after last round of antibiotics. Pt in room with caregiver present. Pt happy to work with therapy. Pt transfers x4 occurances through tx and ambulates in room around bed while sitting for rest break [facility]FP through ambulation. Pt and CONTI discussed UE/back strengthening exercises to complete in bed. Pt very motivated to get back home and continue with her rehab. Continue OT POC  Plan of Care Reviewed With: patient  Outcome Evaluation: Pt very excited about likely discharge Thursday after last round of antibiotics. Pt in room with caregiver present. Pt happy to work with therapy. Pt transfers  x4 occurances through tx and ambulates in room around bed while sitting for rest break care home through ambulation. Pt and CONTI discussed UE/back strengthening exercises to complete in bed. Pt very motivated to get back home and continue with her rehab. Continue OT POC     Outcome Measures       Row Name 05/07/24 1400 05/07/24 0924 05/06/24 1500       How much help from another person do you currently need...    Turning from your back to your side while in flat bed without using bedrails? -- 3  -KINGS --    Moving from lying on back to sitting on the side of a flat bed without bedrails? -- 3  -KINGS --    Moving to and from a bed to a chair (including a wheelchair)? -- 2  -KINGS --    Standing up from a chair using your arms (e.g., wheelchair, bedside chair)? -- 2  -KINGS --    Climbing 3-5 steps with a railing? -- 1  -KINGS --    To walk in hospital room? -- 1  -KINGS --    AM-PAC 6 Clicks Score (PT) -- 12  -KINGS --    Highest Level of Mobility Goal -- 4 --> Transfer to chair/commode  -KINGS --       How much help from another is currently needed...    Putting on and taking off regular lower body clothing? 2  -TS -- 2  -EC    Bathing (including washing, rinsing, and drying) 3  -TS -- 2  -EC    Toileting (which includes using toilet bed pan or urinal) 3  -TS -- 2  -EC    Putting on and taking off regular upper body clothing 3  -TS -- 3  -EC    Taking care of personal grooming (such as brushing teeth) 3  -TS -- 3  -EC    Eating meals 4  -TS -- 4  -EC    AM-PAC 6 Clicks Score (OT) 18  -TS -- 16  -EC       Functional Assessment    Outcome Measure Options -- AM-PAC 6 Clicks Basic Mobility (PT)  -KINGS AM-PAC 6 Clicks Daily Activity (OT)  -EC      Row Name 05/06/24 1057             How much help from another person do you currently need...    Turning from your back to your side while in flat bed without using bedrails? 3  -KINGS      Moving from lying on back to sitting on the side of a flat bed without bedrails? 3  -KINGS      Moving to and from a bed  to a chair (including a wheelchair)? 2  -KINGS      Standing up from a chair using your arms (e.g., wheelchair, bedside chair)? 2  -KINGS      Climbing 3-5 steps with a railing? 1  -KINGS      To walk in hospital room? 1  -KINGS      AM-PAC 6 Clicks Score (PT) 12  -KINGS      Highest Level of Mobility Goal 4 --> Transfer to chair/commode  -KINGS         Functional Assessment    Outcome Measure Options AM-PAC 6 Clicks Basic Mobility (PT)  -KINGS                User Key  (r) = Recorded By, (t) = Taken By, (c) = Cosigned By      Initials Name Provider Type    TS Carolee Giordano COTA Occupational Therapist Assistant    Garcia Jaramillo, PTA Physical Therapist Assistant    Ashley Nash, OTR/L Occupational Therapist                    Time Calculation:    Time Calculation- OT       Row Name 05/07/24 1408             Time Calculation- OT    OT Start Time 1300  -TS      OT Stop Time 1340  -TS      OT Time Calculation (min) 40 min  -TS      Total Timed Code Minutes- OT 40 minute(s)  -TS      OT Received On 05/07/24  -TS         Timed Charges    79210 - OT Self Care/Mgmt Minutes 40  -TS         Total Minutes    Timed Charges Total Minutes 40  -TS       Total Minutes 40  -TS                User Key  (r) = Recorded By, (t) = Taken By, (c) = Cosigned By      Initials Name Provider Type    TS Carolee Giordano COTA Occupational Therapist Assistant                  Therapy Charges for Today       Code Description Service Date Service Provider Modifiers Qty    [id number] HC OT SELF CARE/MGMT/TRAIN EA 15 MIN 5/7/2024 Carolee Giordano COTA GO 3                 Carolee NIELSON. GALLITO Giordano  5/7/2024

## 2024-05-07 NOTE — PROGRESS NOTES
"INFECTIOUS DISEASES PROGRESS NOTE    Patient:  Tawny Shin  YOB: 1955  MRN: [id number]   Admit date: 5/2/2024   Admitting Physician: Saulo Ambriz MD  Primary Care Physician: Moises Oliveira MD    Chief Complaint: Bladder scan \"yesterday and not much\" \"but not much in canister\"        Interval History: The patient had pure wick on.  Was unable to find documentation of bladder scans yesterday.  Tried to have SARAH Miner assist me in looking as sometimes I cannot find nursing documentation.  The patient indicated that she thought they checked her twice and there was not much via bladder scan.    I was told another blood culture positive for CRE however believe the lab probably called that the blood culture was identified as CRE.  Nursing notified Dr. Ambriz    Allergies:   Allergies   Allergen Reactions    Atorvastatin Other (See Comments)     LEG CRAMPS      Amoxicillin Rash    Escitalopram Rash    Nabumetone Rash    Niacin Er Rash    Penicillin G Rash    Penicillins Rash    Simvastatin Rash       Current Scheduled Medications:   apixaban, 5 mg, Oral, BID  aspirin, 81 mg, Oral, Daily  Biotene Dry Mouth Moisturizing, 1 spray, Mouth/Throat, 5x Daily  carvedilol, 12.5 mg, Oral, BID With Meals  cefepime, 2,000 mg, Intravenous, Q8H  DULoxetine, 60 mg, Oral, Daily  First Mouthwash (Magic Mouthwash), 10 mL, Swish & Spit, Q6H  folic acid, 1 mg, Oral, Daily  [Held by provider] furosemide, 40 mg, Oral, Daily  isosorbide mononitrate, 60 mg, Oral, Daily  leflunomide, 20 mg, Oral, Nightly  levothyroxine, 75 mcg, Oral, Q AM  losartan, 50 mg, Oral, Daily  [Held by provider] potassium chloride, 20 mEq, Oral, Daily  predniSONE, 5 mg, Oral, Daily  sodium chloride, 10 mL, Intravenous, Q12H      Current PRN Medications:    acetaminophen **OR** acetaminophen **OR** acetaminophen    senna-docusate sodium **AND** polyethylene glycol **AND** bisacodyl **AND** bisacodyl    calcium carbonate    Diclofenac Sodium    " "Lidocaine    magnesium hydroxide    nitroglycerin    ondansetron ODT **OR** ondansetron    [COMPLETED] Insert Peripheral IV **AND** sodium chloride    sodium chloride    sodium chloride    traMADol            Objective     Vital Signs:  Temp (24hrs), Av.1 °F (36.7 °C), Min:98.1 °F (36.7 °C), Max:98.2 °F (36.8 °C)      /63 (BP Location: Right arm, Patient Position: Lying)   Pulse 74   Temp 98.2 °F (36.8 °C) (Oral)   Resp 18   Ht 167.6 cm (66\")   Wt 95.7 kg (211 lb)   LMP  (LMP Unknown)   SpO2 94%   BMI 34.06 kg/m²         Physical Exam:  General: The patient is nontoxic-appearing sitting up in bed eating breakfast in no acute distress  HEENT : Sclera anicteric and noninjected  Respiratory: Effort even and unlabored, she is not conversationally dyspneic  Hands some edema associated with known history of rheumatoid arthritis  Psych: Pleasant cooperative      Results Review    I reviewed the patient's new clinical results.    Lab Results:    CBC:   Lab Results   Lab 24  1344 24  1905 24  1905 24  0517 24  0518 24  0426 24  0445 24  0720   WBC 6.84 8.03   < > 7.86 5.29 4.69 4.56 5.01   HEMOGLOBIN 10.4* 9.7*   < > 9.5* 8.6* 8.7* 8.5* 8.8*   HEMATOCRIT 33.0* 31.6*   < > 31.1* 27.8* 28.0* 28.2* 28.5*   PLATELETS 200 168  --  149 127* 132* 141 146    < > = values in this interval not displayed.        AutoDiff:   Lab Results   Lab 24  0518 24  04224  0445   NEUTROPHIL % 53.9 47.9 43.0   LYMPHOCYTE % 25.5 32.2 39.0   MONOCYTES % 16.4* 14.1* 12.1*   EOSINOPHIL % 2.5 3.6 3.5   BASOPHIL % 0.8 1.1 1.1   NEUTROS ABS 2.85 2.25 1.96   LYMPHS ABS 1.35 1.51 1.78   MONOS ABS 0.87 0.66 0.55   EOS ABS 0.13 0.17 0.16   BASOS ABS 0.04 0.05 0.05        Manual Diff:    Lab Results   Lab 24  0518 24  0426 24  0445   NEUTROS ABS 2.85 2.25 1.96           CMP:   Lab Results   Lab 24  1344 24  1905 24  0518 24  0426 " 05/06/24  0445 05/07/24  0720   SODIUM 138 139   < > 142 140 141   POTASSIUM 3.1* 3.5   < > 3.6 4.0 4.3   CHLORIDE 98 99   < > 109* 110* 110*   CO2 24.4 29.0   < > 25.0 22.0 24.0   BUN 26* 29*   < > 27* 18 15   CREATININE 1.58* 1.89*   < > 1.31* 0.94 0.91   CALCIUM 9.3 9.3   < > 8.6 8.8 8.9   BILIRUBIN 0.3 0.4  --   --   --   --    ALK PHOS 76 68  --   --   --   --    ALT (SGPT) 24 24  --   --   --   --    AST (SGOT) 21 26  --   --   --   --    GLUCOSE 175* 110*   < > 90 85 82    < > = values in this interval not displayed.       Estimated Creatinine Clearance: 67.1 mL/min (by C-G formula based on SCr of 0.91 mg/dL).    Culture Results:    Microbiology Results (last 10 days)       Procedure Component Value - Date/Time    Blood Culture With BREONNA - Blood, Arm, Left [[id number]]  (Normal) Collected: 05/03/24 1738    Lab Status: Preliminary result Specimen: Blood from Arm, Left Updated: 05/06/24 1815     Blood Culture No growth at 3 days    Blood Culture - Blood, Hand, Left [[id number]]  (Abnormal)  (Susceptibility) Collected: 05/02/24 2130    Lab Status: Final result Specimen: Blood from Hand, Left Updated: 05/07/24 0659     Blood Culture Enterobacter cloacae complex CRE     Comment:   Carbapenem resistant Enterobacteriaceae, patient may be an isolation risk.  No carbapenemase detected.        Isolated from Pediatric Bottle     Gram Stain Pediatric Bottle Gram negative bacilli    Susceptibility        Enterobacter cloacae complex CRE      LICHA      Cefepime Susceptible      Gentamicin Susceptible      Imipenem Intermediate      Levofloxacin Resistant      Meropenem Resistant      Trimethoprim + Sulfamethoxazole Resistant                       Susceptibility Comments       Enterobacter cloacae complex CRE    Cefazolin sensitivity will not be reported for Enterobacteriaceae in non-urine isolates. If cefazolin is preferred, please call the microbiology lab to request an E-test.  With the exception of urinary-sourced  infections, aminoglycosides should not be used as monotherapy.               Blood Culture - Blood, Arm, Right [[id number]]  (Normal) Collected: 05/02/24 2130    Lab Status: Preliminary result Specimen: Blood from Arm, Right Updated: 05/06/24 2146     Blood Culture No growth at 4 days    Blood Culture ID, PCR - Blood, Hand, Left [[id number]]  (Abnormal) Collected: 05/02/24 2130    Lab Status: Final result Specimen: Blood from Hand, Left Updated: 05/03/24 1229     BCID, PCR Enterobacter cloacae complex. Identification by BCID2 PCR.     BOTTLE TYPE Pediatric Bottle    Narrative:      No resistance genes detected.    Urine Culture - Urine, Straight Cath [[id number]]  (Abnormal)  (Susceptibility) Collected: 05/02/24 1956    Lab Status: Final result Specimen: Urine from Straight Cath Updated: 05/06/24 1257     Urine Culture >100,000 CFU/mL Enterobacter cloacae complex CRE     Comment:   No carbapenemase detected.  Consider infectious disease consult.  Susceptibility results may not correlate to clinical outcomes.  Carbapenem resistant Enterobacteriaceae, patient may be an isolation risk.       Narrative:      Colonization of the urinary tract without infection is common. Treatment is discouraged unless the patient is symptomatic, pregnant, or undergoing an invasive urologic procedure.    Susceptibility        Enterobacter cloacae complex CRE      LICHA      Amoxicillin + Clavulanate Resistant      Cefazolin Resistant      Cefepime Susceptible      Ceftazidime Resistant      Ceftriaxone Resistant      Gentamicin Susceptible      Levofloxacin Resistant      Nitrofurantoin Resistant      Piperacillin + Tazobactam Resistant      Trimethoprim + Sulfamethoxazole Resistant                           COVID-19, FLU A/B, RSV PCR 1 HR TAT - Swab, Nasopharynx [[id number]]  (Normal) Collected: 05/02/24 1826    Lab Status: Final result Specimen: Swab from Nasopharynx Updated: 05/02/24 1910     COVID19 Not Detected     Influenza A PCR Not  Detected     Influenza B PCR Not Detected     RSV, PCR Not Detected    Narrative:      Fact sheet for providers: https://www.fda.gov/media/776305/download    Fact sheet for patients: https://www.fda.gov/media/590566/download    Test performed by PCR.                 Radiology:   Imaging Results (Last 72 Hours)       ** No results found for the last 72 hours. **                Active Hospital Problems    Diagnosis     **Bacteremia due to Enterobacter species     Generalized weakness     History of urinary retention     Stage 3b chronic kidney disease     UTI (urinary tract infection)     Anemia of chronic disease     Coronary artery disease involving native coronary artery of native heart without angina pectoris     Sick sinus syndrome     Rheumatoid arthritis involving multiple sites     Chronic anticoagulation     Iron deficiency anemia     Chronic embolism and thrombosis of unspecified deep veins of left lower extremity        IMPRESSION:  Enterobacter cloacae bacteremia-CRE.  Felt secondary to urinary tract infection  Enterobacter cloacae in urine-CRE reported May 5.  Recent admission with E. coli bacteremia April 19.  This was attributed to urinary tract infection (urine culture reported as mixed brett) patient does indicate that she felt like she was not completely emptying her bladder and had some urgency while she was at home between hospitalizations.  There was mention of urinary retention and her admission history and physical.  She has been bladder scanned once and reportedly there was no definite residual  Thrombocytopenia-appears resolved  History of prolonged MSSA bacteremia      RECOMMENDATION:   DC leflunomide in face of active infection-patient is somewhat concerned about coming off that medication.  I will check with Dr. Layo Chaidez who is her rheumatologist  Continue cefepime-plan on 7 days total  Will commend discontinuing pure wick and making sure patient is able to void without any urinary  symptoms and a postvoid residual prior to discharge.  May need in and out catheterization to verify with bladder scan.  Patient had reported frequency urgency and decreased urine output prior to admission  Continue to increase activity.  Will ask microbiology to test for additional antibiotic susceptibilities in case patient has recurrent infection that is then resistant to cefepime.  At least though susceptibilities will be available      Discussed with Dr. Ambriz  Will communicate with Dr. Chaidez about her leflunomide.  Discussed with SARAH Miner    >>>> Addendum: Communicated with Dr. Chaidez.  Leflunomide has such a long half-life, no real benefit to stopping it and patient definitely prefers to continue.  Patient has also been on Brie Bang MD  05/07/24  08:36 CDT

## 2024-05-07 NOTE — PROGRESS NOTES
[facility] Medicine Services  INPATIENT PROGRESS NOTE    Patient Name: Tawny Shin  Date of Admission: 5/2/2024  Today's Date: 05/07/24  Length of Stay: 4  Primary Care Physician: Moises Oliveira MD    Subjective   Chief Complaint: Bacteremia/UTI.   HPI   Blood pressure still remains hide this morning, increase Cozaar . discussed with patient infectious disease plan to continue antibiotic for another 2 days, possible discharge dose the evening discussed with patient.  Patient still remain anxious to go home.  Elevated TSH, free T4 is pending.  Hemoglobin stable.  White blood cells remain normal.    Review of Systems   Constitutional:  Positive for activity change, appetite change and fatigue. Negative for chills and fever.   HENT:  Negative for hearing loss, nosebleeds, tinnitus and trouble swallowing.    Eyes:  Negative for visual disturbance.   Respiratory:  Negative for cough, chest tightness, shortness of breath and wheezing.    Cardiovascular:  Negative for chest pain, palpitations and leg swelling.   Gastrointestinal:  Negative for abdominal distention, abdominal pain, blood in stool, constipation, diarrhea, nausea and vomiting.   Endocrine: Negative for cold intolerance, heat intolerance, polydipsia, polyphagia and polyuria.   Genitourinary:  Negative for decreased urine volume, difficulty urinating, dysuria, flank pain, frequency and hematuria.   Musculoskeletal:  Positive for arthralgias, gait problem and myalgias. Negative for joint swelling.   Skin:  Negative for rash.   Allergic/Immunologic: Negative for immunocompromised state.   Neurological:  Positive for weakness. Negative for dizziness, syncope, light-headedness and headaches.   Hematological:  Negative for adenopathy. Does not bruise/bleed easily.   Psychiatric/Behavioral:  Negative for confusion and sleep disturbance. The patient is not nervous/anxious.       All pertinent negatives and positives are  as above. All other systems have been reviewed and are negative unless otherwise stated.     Objective    Temp:  [98.1 °F (36.7 °C)-98.2 °F (36.8 °C)] 98.2 °F (36.8 °C)  Heart Rate:  [69-74] 74  Resp:  [16-18] 18  BP: (128-165)/(50-69) 153/63  Physical Exam  Vitals and nursing note reviewed.   Constitutional:       Comments: Chronically ill.   HENT:      Head: Normocephalic.   Eyes:      Conjunctiva/sclera: Conjunctivae normal.      Pupils: Pupils are equal, round, and reactive to light.   Cardiovascular:      Rate and Rhythm: Normal rate and regular rhythm.      Heart sounds: Normal heart sounds.   Pulmonary:      Effort: Pulmonary effort is normal. No respiratory distress.      Breath sounds: Normal breath sounds.   Abdominal:      General: Bowel sounds are normal. There is no distension.      Palpations: Abdomen is soft.      Tenderness: There is no abdominal tenderness.      Comments: Obesity .   Musculoskeletal:         General: No swelling.      Cervical back: Neck supple.      Comments: Deformities of the hand from arthritis.   Skin:     General: Skin is warm and dry.      Capillary Refill: Capillary refill takes 2 to 3 seconds.      Findings: No rash.   Neurological:      Mental Status: She is alert and oriented to person, place, and time.      Motor: Weakness present.      Coordination: Coordination abnormal.      Gait: Gait abnormal.   Psychiatric:         Mood and Affect: Mood normal.         Behavior: Behavior normal.         Thought Content: Thought content normal.       Results Review:  I have reviewed the labs, radiology results, and diagnostic studies.    Laboratory Data:   Results from last 7 days   Lab Units 05/07/24  0720 05/06/24 0445 05/05/24 0426   WBC 10*3/mm3 5.01 4.56 4.69   HEMOGLOBIN g/dL 8.8* 8.5* 8.7*   HEMATOCRIT % 28.5* 28.2* 28.0*   PLATELETS 10*3/mm3 146 141 132*        Results from last 7 days   Lab Units 05/07/24  0720 05/06/24  0445 05/05/24  0426 05/03/24  0518 05/02/24  1905  05/01/24  1344   SODIUM mmol/L 141 140 142   < > 139 138   POTASSIUM mmol/L 4.3 4.0 3.6   < > 3.5 3.1*   CHLORIDE mmol/L 110* 110* 109*   < > 99 98   CO2 mmol/L 24.0 22.0 25.0   < > 29.0 24.4   BUN mg/dL 15 18 27*   < > 29* 26*   CREATININE mg/dL 0.91 0.94 1.31*   < > 1.89* 1.58*   CALCIUM mg/dL 8.9 8.8 8.6   < > 9.3 9.3   BILIRUBIN mg/dL  --   --   --   --  0.4 0.3   ALK PHOS U/L  --   --   --   --  68 76   ALT (SGPT) U/L  --   --   --   --  24 24   AST (SGOT) U/L  --   --   --   --  26 21   GLUCOSE mg/dL 82 85 90   < > 110* 175*    < > = values in this interval not displayed.       Culture Data:   Blood Culture   Date Value Ref Range Status   05/03/2024 No growth at 3 days  Preliminary   05/02/2024 Enterobacter cloacae complex CRE (C)  Final     Comment:       Carbapenem resistant Enterobacteriaceae, patient may be an isolation risk.  No carbapenemase detected.   05/02/2024 No growth at 4 days  Preliminary     Urine Culture   Date Value Ref Range Status   05/02/2024 >100,000 CFU/mL Enterobacter cloacae complex CRE (A)  Final     Comment:       No carbapenemase detected.  Consider infectious disease consult.  Susceptibility results may not correlate to clinical outcomes.  Carbapenem resistant Enterobacteriaceae, patient may be an isolation risk.       Radiology Data:   Imaging Results (Last 24 Hours)       ** No results found for the last 24 hours. **            I have reviewed the patient's current medications.     Assessment/Plan   Assessment  Active Hospital Problems    Diagnosis     **Bacteremia due to Enterobacter species     Generalized weakness     History of urinary retention     Stage 3b chronic kidney disease     UTI (urinary tract infection)     Anemia of chronic disease     Coronary artery disease involving native coronary artery of native heart without angina pectoris     Sick sinus syndrome     Rheumatoid arthritis involving multiple sites     Chronic anticoagulation     Iron deficiency anemia      Chronic embolism and thrombosis of unspecified deep veins of left lower extremity        Treatment Plan  UTI/bacteremia.  ID consult.  Cefepime.     Chronic stage IIIb renal failure.  Creatinine normalized.     Rheumatoid arthritis/Hx Sjogren .  Biotene.  Magic mouthwash. Arava.  Prednisone daily.     History of DVT . Eliquis.     CAD/hypertension/hyperlipidemia.  Aspirin. . Hold Lasix.  Imdur .  Increase Cozaar.  Nitro as needed.     Depression/anxiety.  Cymbalta.     Hypokalemia.  Normal.  Hold p.o. potassium daily.     Anemia.  Hemoglobin stable.  No sign of acute bleed.     Thrombocytopenia.  Platelet count is normal.     Hypothyroidism . Synthroid.  High TSH.  Free T4 pending.     Reflux.  Tums.  Zofran as needed.     Constipation.  Magnesium oxide as needed.  Constipation protocol  as needed.  .     Pain.  Voltaren gel as needed.  Lidocaine patch as needed.  Ultram as needed.     Nutrition . Folic acid.  Cardiac diet.  Boost supplement.     Deconditioning.  PT and OT consult.  Patient get around with a walker at baseline.     Blood culture- Enterobacter cloacae complex CRE  . Urine culture-Enterobacter cloacae complex CRE.     Dr. Shin's mom.     Medical Decision Making  Number and Complexity of problems: UTI/bacteremia/chronic stage IIIb renal failure/rheumatoid arthritis/CAD/hypertension  Differential Diagnosis: None     Conditions and Status        Condition is unchanged.     MDM Data  External documents reviewed: Previous note  Cardiac tracing (EKG, telemetry) interpretation: Sinus  Radiology interpretation: None  Labs reviewed: Laboratory  Any tests that were considered but not ordered: Lab in a.m.     Decision rules/scores evaluated (example [id number], Wells, etc): None     Discussed with: Patient and caregiver     Care Planning  Shared decision making: Patient and caregiver  Code status and discussions: Full code     Disposition  Social Determinants of Health that impact treatment or disposition:  From home  Plan for possible discharge Thursday evening after last dose of antibiotics.       Electronically signed by Saulo Ambriz MD, 05/07/24, 08:47 CDT.

## 2024-05-08 LAB
ANION GAP SERPL CALCULATED.3IONS-SCNC: 7 MMOL/L (ref 5–15)
BACTERIA SPEC AEROBE CULT: NORMAL
BUN SERPL-MCNC: 15 MG/DL (ref 8–23)
BUN/CREAT SERPL: 16.3 (ref 7–25)
CALCIUM SPEC-SCNC: 8.8 MG/DL (ref 8.6–10.5)
CHLORIDE SERPL-SCNC: 112 MMOL/L (ref 98–107)
CO2 SERPL-SCNC: 23 MMOL/L (ref 22–29)
CREAT SERPL-MCNC: 0.92 MG/DL (ref 0.57–1)
DEPRECATED RDW RBC AUTO: 59.3 FL (ref 37–54)
EGFRCR SERPLBLD CKD-EPI 2021: 67.1 ML/MIN/1.73
ERYTHROCYTE [DISTWIDTH] IN BLOOD BY AUTOMATED COUNT: 16.9 % (ref 12.3–15.4)
GLUCOSE SERPL-MCNC: 82 MG/DL (ref 65–99)
HCT VFR BLD AUTO: 28.4 % (ref 34–46.6)
HGB BLD-MCNC: 8.7 G/DL (ref 12–15.9)
MCH RBC QN AUTO: 29.8 PG (ref 26.6–33)
MCHC RBC AUTO-ENTMCNC: 30.6 G/DL (ref 31.5–35.7)
MCV RBC AUTO: 97.3 FL (ref 79–97)
PLATELET # BLD AUTO: 155 10*3/MM3 (ref 140–450)
PMV BLD AUTO: 11 FL (ref 6–12)
POTASSIUM SERPL-SCNC: 4.3 MMOL/L (ref 3.5–5.2)
RBC # BLD AUTO: 2.92 10*6/MM3 (ref 3.77–5.28)
SODIUM SERPL-SCNC: 142 MMOL/L (ref 136–145)
WBC NRBC COR # BLD AUTO: 5.6 10*3/MM3 (ref 3.4–10.8)

## 2024-05-08 PROCEDURE — 85027 COMPLETE CBC AUTOMATED: CPT | Performed by: FAMILY MEDICINE

## 2024-05-08 PROCEDURE — 80048 BASIC METABOLIC PNL TOTAL CA: CPT | Performed by: FAMILY MEDICINE

## 2024-05-08 PROCEDURE — 97530 THERAPEUTIC ACTIVITIES: CPT

## 2024-05-08 PROCEDURE — 25010000002 CEFEPIME PER 500 MG: Performed by: INTERNAL MEDICINE

## 2024-05-08 PROCEDURE — 97110 THERAPEUTIC EXERCISES: CPT

## 2024-05-08 PROCEDURE — 99231 SBSQ HOSP IP/OBS SF/LOW 25: CPT | Performed by: INTERNAL MEDICINE

## 2024-05-08 PROCEDURE — 97535 SELF CARE MNGMENT TRAINING: CPT

## 2024-05-08 PROCEDURE — 63710000001 PREDNISONE PER 5 MG: Performed by: NURSE PRACTITIONER

## 2024-05-08 RX ADMIN — CEFEPIME 2000 MG: 2 INJECTION, POWDER, FOR SOLUTION INTRAVENOUS at 09:42

## 2024-05-08 RX ADMIN — Medication 1 SPRAY: at 14:07

## 2024-05-08 RX ADMIN — DIPHENHYDRAMINE HYDROCHLORIDE AND LIDOCAINE HYDROCHLORIDE AND ALUMINUM HYDROXIDE AND MAGNESIUM HYDRO 10 ML: KIT at 23:41

## 2024-05-08 RX ADMIN — LEFLUNOMIDE 20 MG: 20 TABLET ORAL at 20:42

## 2024-05-08 RX ADMIN — DIPHENHYDRAMINE HYDROCHLORIDE AND LIDOCAINE HYDROCHLORIDE AND ALUMINUM HYDROXIDE AND MAGNESIUM HYDRO 10 ML: KIT at 14:07

## 2024-05-08 RX ADMIN — CEFEPIME 2000 MG: 2 INJECTION, POWDER, FOR SOLUTION INTRAVENOUS at 00:35

## 2024-05-08 RX ADMIN — CARVEDILOL 12.5 MG: 6.25 TABLET, FILM COATED ORAL at 18:09

## 2024-05-08 RX ADMIN — LOSARTAN POTASSIUM 100 MG: 50 TABLET, FILM COATED ORAL at 09:42

## 2024-05-08 RX ADMIN — Medication 10 ML: at 20:42

## 2024-05-08 RX ADMIN — APIXABAN 5 MG: 5 TABLET, FILM COATED ORAL at 20:42

## 2024-05-08 RX ADMIN — FOLIC ACID 1 MG: 1 TABLET ORAL at 09:43

## 2024-05-08 RX ADMIN — DIPHENHYDRAMINE HYDROCHLORIDE AND LIDOCAINE HYDROCHLORIDE AND ALUMINUM HYDROXIDE AND MAGNESIUM HYDRO 10 ML: KIT at 18:09

## 2024-05-08 RX ADMIN — ISOSORBIDE MONONITRATE 60 MG: 60 TABLET, EXTENDED RELEASE ORAL at 09:42

## 2024-05-08 RX ADMIN — DIPHENHYDRAMINE HYDROCHLORIDE AND LIDOCAINE HYDROCHLORIDE AND ALUMINUM HYDROXIDE AND MAGNESIUM HYDRO 10 ML: KIT at 06:15

## 2024-05-08 RX ADMIN — Medication 1 SPRAY: at 18:12

## 2024-05-08 RX ADMIN — DULOXETINE HYDROCHLORIDE 60 MG: 30 CAPSULE, DELAYED RELEASE ORAL at 09:43

## 2024-05-08 RX ADMIN — CARVEDILOL 12.5 MG: 6.25 TABLET, FILM COATED ORAL at 09:43

## 2024-05-08 RX ADMIN — CEFEPIME 2000 MG: 2 INJECTION, POWDER, FOR SOLUTION INTRAVENOUS at 18:09

## 2024-05-08 RX ADMIN — APIXABAN 5 MG: 5 TABLET, FILM COATED ORAL at 09:43

## 2024-05-08 RX ADMIN — ASPIRIN 81 MG: 81 TABLET, COATED ORAL at 09:42

## 2024-05-08 RX ADMIN — LEVOTHYROXINE SODIUM 75 MCG: 75 TABLET ORAL at 06:15

## 2024-05-08 RX ADMIN — PREDNISONE 5 MG: 5 TABLET ORAL at 09:43

## 2024-05-08 NOTE — CASE MANAGEMENT/SOCIAL WORK
Continued Stay Note   [facility]     Patient Name: Tawny Shin  MRN: [id number]  Today's Date: 5/8/2024    Admit Date: 5/2/2024        Discharge Plan       Row Name 05/08/24 1239       Plan    Plan Comments Events noted. Plan is still for dc to home when medically stable. Pt is requesting [facility] at time of dc. Will need orders to arrange.                   Discharge Codes    No documentation.                       OSIEL Pizano

## 2024-05-08 NOTE — THERAPY TREATMENT NOTE
Acute Care - Physical Therapy Treatment Note  [facility]     Patient Name: Tawny Shin  : 1955  MRN: [id number]  Today's Date: 2024      Visit Dx:     ICD-10-CM ICD-9-CM   1. Weakness  R53.1 780.79   2. Urinary tract infection without hematuria, site unspecified  N39.0 599.0   3. Dysphagia, unspecified type [R13.10]  R13.10 787.20   4. Generalized weakness [R53.1]  R53.1 780.79     Patient Active Problem List   Diagnosis    T12 compression fracture, initial encounter    Chronic embolism and thrombosis of unspecified deep veins of left lower extremity    Chronic anticoagulation    Iron deficiency anemia    Osteoporosis    E coli bacteremia    Epidural hematoma    Pleural effusion, left    Functional neurological symptom disorder with weakness or paralysis    Overweight (BMI 25.0-29.9)    Rheumatoid arthritis involving multiple sites    (HFpEF) heart failure with preserved ejection fraction    Venous insufficiency    Coronary artery disease involving native coronary artery of native heart without angina pectoris    Sick sinus syndrome    Essential hypertension    Pressure injury of skin of heel    Chronic pain    Anemia of chronic disease    Cholelithiasis    Pressure injury of skin of buttock    Near functional paraplegia    Skin cancer    Squamous cell carcinoma of back    Hematoma    Right-sided chest wall pain    Hyperlipidemia LDL goal <70    Malodorous urine    UTI (urinary tract infection)    Stage 3b chronic kidney disease    Cyst of buttocks    Sepsis due to Escherichia coli without acute organ dysfunction    Generalized weakness    Paralysis    History of urinary retention    Bacteremia due to Enterobacter species    Bacteremia     Past Medical History:   Diagnosis Date    Age-related osteoporosis with current pathological fracture 2020    Arthritis     Asthma     Bilateral bunions 2020    Cancer     Cardiac pacemaker syndrome 2020    Overview:  - heart block - implanted  "11/16    Charcot's joint of foot, left 12/23/2020    Chronic deep vein thrombosis (DVT) of right lower extremity 06/23/2021    Chronic pain syndrome 06/22/2021    Chronic sinusitis     COPD (chronic obstructive pulmonary disease)     Coronary artery disease     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    Elevated cholesterol     Eustachian tube dysfunction     Heart disease     Herpes simplex     History of transfusion     Hyperlipidemia     Hypertension     Hypothyroidism 12/23/2020    Intrinsic asthma 12/23/2020    Knee dislocation     Labral tear of right hip joint     Laryngitis sicca     Laryngitis, chronic     Left carotid bruit 03/09/2016    MI (myocardial infarction)     Myalgia due to statin 06/25/2019    Open wound of right hip 09/14/2021    Osteomyelitis of right femur 07/06/2021    Otorrhea     Pacemaker 11/17/2016    Primary osteoarthritis of left knee 12/23/2020    Psoriasis vulgaris 12/23/2020    S/P coronary artery stent placement 03/09/2016    Sensorineural hearing loss     Seropositive rheumatoid arthritis of multiple sites 12/27/2019    Overview:  -myochrysine '93-'96 -methotrexate '96--->11/98;r/s  restarted 2/99--> 8/14 (anemia) -sulfasalazine- not effective -penicillamine 6/98-->10/98; no effect -leflunomide 11/98--> - Humira '13-->didn't take - Enbrel 12/14-->3/15- no effect!   Last Assessment & Plan:  - \"aching all over\" because she had to be off her anti-rheumatic drugs for 2 weeks in preparation for her R knee surgery - he    Sick sinus syndrome 12/27/2019    Sjogren's disease     Spondylolisthesis of lumbar region 01/17/2018    Syncope, recurrent 02/08/2021    Urinary tract infection      Past Surgical History:   Procedure Laterality Date    A-V CARDIAC PACEMAKER INSERTION  2016    ATRIAL CARDIAC PACEMAKER INSERTION      CARDIAC CATHETERIZATION      CATARACT EXTRACTION      CERVICAL CORPECTOMY N/A 3/3/2021    Procedure: CERVICAL 6 CORPECTOMY WITH TITANIUM CAGE WITH NEURO MONITORING;  " Surgeon: Bandar Shea MD;  Location:  [facility];  Service: Neurosurgery;  Laterality: N/A;    COLONOSCOPY  11/08/2011    One fold in the ascending colon which showed ulcer otherwise normal exam    COLONOSCOPY  11/12/2004    Normal exam repeat in five years    CORONARY ANGIOPLASTY WITH STENT PLACEMENT      X 2; 2013 & 2014    ENDOSCOPY  07/10/2014    Normal exam    FLAP LEG Right 9/14/2021    Procedure: RIGHT GLUTEAL FASCIOCUTANEOUS ADVANCEMENT FLAP AND RIGHT TENSOR FASCIAL JESSICA FLAP;  Surgeon: Amadeo Turner MD;  Location:  [facility];  Service: Plastics;  Laterality: Right;    HIP ABDUCTION TENOTOMY BILATERAL Right 1/14/2021    Procedure: RIGHT HIP GLUTEUS MEDLUS / MINIMUS REPAIR, POSSIBLE ACHILLES ALLOGRAFT;  Surgeon: Nino Carlson MD;  Location:  [facility];  Service: Orthopedics;  Laterality: Right;    INCISION AND DRAINAGE ABSCESS Right 6/4/2022    Procedure: INCISION AND DRAINAGE ABSCESS right hip;  Surgeon: Magda Salcido MD;  Location:  [facility];  Service: General;  Laterality: Right;    INCISION AND DRAINAGE ABSCESS Right 6/10/2022    Procedure: RIGHT HIP INCISION AND DRAINAGE. MD NEEDS 3L VANC IRRIGATION, CURRETTES, DAICANS, KERLEX ROLLS;  Surgeon: Amadeo Turner MD;  Location:  [facility];  Service: Plastics;  Laterality: Right;    INCISION AND DRAINAGE HIP Right 2/9/2021    Procedure: HIP INCISION AND DRAINAGE;  Surgeon: Nino Carlson MD;  Location:  [facility];  Service: Orthopedics;  Laterality: Right;    INCISION AND DRAINAGE LEG Right 10/24/2021    Procedure: INCISION AND DRAINAGE LOWER EXTREMITY;  Surgeon: Amadeo Turner MD;  Location:  [facility];  Service: Plastics;  Laterality: Right;    INCISION AND DRAINAGE OF WOUND Right 7/8/2021    Procedure: INCISION AND DRAINAGE WOUND RIGHT HIP;  Surgeon: James Huntley MD;  Location:  [facility];  Service: Orthopedics;  Laterality: Right;    JOINT REPLACEMENT      KYPHOPLASTY WITH BIOPSY Bilateral 10/26/2021    Procedure: THOARCIC 12  KYPHOPLASTY WITH BIOPSY;  Surgeon: Bandar Shea MD;  Location:  [facility];  Service: Neurosurgery;  Laterality: Bilateral;    LEG DEBRIDEMENT Right 9/14/2021    Procedure: DEBRIDEMENT OF RIGHT HIP WOUND, RIGHT GLUTEAL FASCIOCUTANEOUS ADVANCEMENT FLAP AND RIGHT TENSOR FASCIAL JESSICA FLAP;  Surgeon: Amadeo Turner MD;  Location:  [facility];  Service: Plastics;  Laterality: Right;    LUMBAR DISCECTOMY Right 3/23/2021    Procedure: LUMBAR DISCECTOMY MICRO, Lumbar 1/2 right;  Surgeon: Bandar Shea MD;  Location:  [facility];  Service: Neurosurgery;  Laterality: Right;    LUMBAR FUSION N/A 1/19/2018    Procedure: L3-4,L4-5 DECOMPRESSION, POSTERIOR SPINAL FUSION WITH INSTRUMENTATION;  Surgeon: Fortino Oropeza MD;  Location:  [facility];  Service:     LUMBAR LAMINECTOMY WITH FUSION Left 1/17/2018    Procedure: LEFT L3-4 L4-5 LATERAL LUMBAR INTERBODY FUSION;  Surgeon: Fortino Oropeza MD;  Location:  [facility];  Service:     MASS EXCISION Right 4/23/2024    Procedure: RIGHT BUTTOCK MASS EXCISION;  Surgeon: Moises Keyes MD;  Location:  [facility];  Service: General;  Laterality: Right;    MYRINGOTOMY W/ TUBES  09/04/2014    TUBES NO LONGER IN PLACE    OTHER SURGICAL HISTORY      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 so hardware was removed and spacers were placed    REPLACEMENT TOTAL KNEE Right      PT Assessment (Last 12 Hours)       PT Evaluation and Treatment       Row Name 05/08/24 1438 05/08/24 1342       Physical Therapy Time and Intention    Subjective Information complains of;weakness  -KINGS --    Document Type therapy note (daily note)  -KINGS --    Mode of Treatment physical therapy  -KINGS --    Session Not Performed -- patient unavailable for treatment  -KINGS    Comment, Session Not Performed -- up with OT  -KINGS      Row Name 05/08/24 0920          Physical Therapy Time and Intention    Subjective Information complains of;weakness;fatigue  not feeling well  -KINGS     Document Type therapy note (daily note)  -KINGS      Mode of Treatment physical therapy  -KINGS       Row Name 05/08/24 1438 05/08/24 0920       General Information    Existing Precautions/Restrictions fall  -KINGS fall  -KINGS      Row Name 05/08/24 1438 05/08/24 0920       Pain    Pretreatment Pain Rating 0/10 - no pain  -KINGS 0/10 - no pain  -KINGS    Posttreatment Pain Rating 0/10 - no pain  -KINGS 0/10 - no pain  -KINGS      Row Name 05/08/24 1438 05/08/24 0920       Bed Mobility    Supine-Sit [place] (Bed Mobility) verbal cues;contact guard  -KINGS --    Sit-Supine [place] (Bed Mobility) verbal cues;minimum assist (75% patient effort)  -KINGS verbal cues;minimum assist (75% patient effort);moderate assist (50% patient effort)  -    Assistive Device (Bed Mobility) -- bed rails;head of bed elevated  -      Row Name 05/08/24 1438 05/08/24 0920       Transfers    Transfers -- chair-bed transfer  -KINGS    Comment, (Transfers) stood x 4, focused on maintaining standing  -KINGS --      Row Name 05/08/24 0920          Chair-Bed Transfer    Chair-Bed [place] (Transfers) verbal cues;minimum assist (75% patient effort)  -     Assistive Device (Chair-Bed Transfers) walker, front-wheeled  -KINGS       Row Name 05/08/24 1438 05/08/24 0920       Sit-Stand Transfer    Sit-Stand [place] (Transfers) verbal cues;contact guard;minimum assist (75% patient effort)  -KINGS verbal cues;minimum assist (75% patient effort)  -    Assistive Device (Sit-Stand Transfers) walker, front-wheeled  -KINGS walker, front-wheeled  -KINGS      Row Name 05/08/24 1438 05/08/24 0920       Stand-Sit Transfer    Stand-Sit [place] (Transfers) verbal cues;contact guard;minimum assist (75% patient effort)  -KINGS verbal cues;minimum assist (75% patient effort)  -    Assistive Device (Stand-Sit Transfers) walker, front-wheeled  -KINGS walker, front-wheeled  -KINGS      Row Name 05/08/24 1438          Motor Skills    Comments, Therapeutic Exercise sitting AROM BLE 2 x 10  -       Row Name             Wound 04/23/24 0936  Right gluteal Incision    Wound - Properties Group Placement Date: 04/23/24  -EP Placement Time: 0936  -EP Present on Original Admission: N  -EP Side: Right  -EP Location: gluteal  -EP Primary Wound Type: Incision  -EP    Retired Wound - Properties Group Placement Date: 04/23/24  -EP Placement Time: 0936  -EP Present on Original Admission: N  -EP Side: Right  -EP Location: gluteal  -EP Primary Wound Type: Incision  -EP    Retired Wound - Properties Group Date first assessed: 04/23/24  -EP Time first assessed: 0936  -EP Present on Original Admission: N  -EP Side: Right  -EP Location: gluteal  -EP Primary Wound Type: Incision  -EP      Row Name 05/08/24 1438 05/08/24 0920       Positioning and Restraints    Pre-Treatment Position in bed  -KINGS sitting in chair/recliner  -KINGS    Post Treatment Position bed  -KINGS bed  -KINGS    In Bed fowlers;call light within reach;encouraged to call for assist;side rails up x2  -KINGS fowlers;call light within reach;encouraged to call for assist;with family/caregiver;side rails up x2  -KINGS              User Key  (r) = Recorded By, (t) = Taken By, (c) = Cosigned By      Initials Name Provider Type    Garcia Jaramillo PTA Physical Therapist Assistant    Sonny Thompson, RN Registered Nurse                    Physical Therapy Education       Title: PT OT SLP Therapies (In Progress)       Topic: Physical Therapy (In Progress)       Point: Mobility training (Done)       Learning Progress Summary             Patient Acceptance, E, VU by MS at 5/5/2024 0910    Comment: role of PT in her care    Acceptance, E, VU by KAMILLA at 5/2/2024 2240   Caregiver Acceptance, E, VU by KAMILLA at 5/2/2024 2240                         Point: Home exercise program (Done)       Learning Progress Summary             Patient Acceptance, E, VU by KAMILLA at 5/2/2024 2240   Caregiver Acceptance, E, VU by KAMILLA at 5/2/2024 2240                         Point: Body mechanics (Not Started)       Learner Progress:  Not documented in this  visit.              Point: Precautions (Done)       Learning Progress Summary             Patient Acceptance, E, VU by  at 5/2/2024 2240   Caregiver Acceptance, E, VU by  at 5/2/2024 2240                                         User Key       Initials Effective Dates Name Provider Type Discipline    MS 07/11/23 -  Nicole Olson, PT, DPT, NCS Physical Therapist PT     04/25/23 -  Rick Townsend, RN Registered Nurse Nurse                  PT Recommendation and Plan     Plan of Care Reviewed With: patient  Progress: improving  Outcome Evaluation: Pt was in bed, eager to get up.  Performed LE exercises AAROM.  Transfered supine to sitting with min assist.  Transfered sit to stand with min assist.  Pt improved with sit to stand today and able to maintain standing longer with RWX and CGA/min assist.  Pt took a few steps bed to chair with RWX and min assist.  Will continue to work with pt to increase strength and improve transfers.   Outcome Measures       Row Name 05/08/24 1500 05/08/24 0920 05/07/24 1400       How much help from another person do you currently need...    Turning from your back to your side while in flat bed without using bedrails? -- 3  -KINGS --    Moving from lying on back to sitting on the side of a flat bed without bedrails? -- 3  -KINGS --    Moving to and from a bed to a chair (including a wheelchair)? -- 2  -KINGS --    Standing up from a chair using your arms (e.g., wheelchair, bedside chair)? -- 2  -KINGS --    Climbing 3-5 steps with a railing? -- 1  -KINGS --    To walk in hospital room? -- 1  -KINGS --    AM-PAC 6 Clicks Score (PT) -- 12  -KINGS --    Highest Level of Mobility Goal -- 4 --> Transfer to chair/commode  -KINGS --       How much help from another is currently needed...    Putting on and taking off regular lower body clothing? 2  -TS -- 2  -TS    Bathing (including washing, rinsing, and drying) 3  -TS -- 3  -TS    Toileting (which includes using toilet bed pan or urinal) 3  -TS -- 3  -TS    Putting  on and taking off regular upper body clothing 3  -TS -- 3  -TS    Taking care of personal grooming (such as brushing teeth) 3  -TS -- 3  -TS    Eating meals 4  -TS -- 4  -TS    AM-PAC 6 Clicks Score (OT) 18  -TS -- 18  -TS       Functional Assessment    Outcome Measure Options -- AM-PAC 6 Clicks Basic Mobility (PT)  -KINGS --      Row Name 05/07/24 0924 05/06/24 1500 05/06/24 1057       How much help from another person do you currently need...    Turning from your back to your side while in flat bed without using bedrails? 3  -KINGS -- 3  -KINGS    Moving from lying on back to sitting on the side of a flat bed without bedrails? 3  -KINGS -- 3  -KINGS    Moving to and from a bed to a chair (including a wheelchair)? 2  -KINGS -- 2  -KINGS    Standing up from a chair using your arms (e.g., wheelchair, bedside chair)? 2  -KINGS -- 2  -KINGS    Climbing 3-5 steps with a railing? 1  -KINGS -- 1  -KINGS    To walk in hospital room? 1  -KINGS -- 1  -KINGS    AM-PAC 6 Clicks Score (PT) 12  -KINGS -- 12  -KINGS    Highest Level of Mobility Goal 4 --> Transfer to chair/commode  -KINGS -- 4 --> Transfer to chair/commode  -KINGS       How much help from another is currently needed...    Putting on and taking off regular lower body clothing? -- 2  -EC --    Bathing (including washing, rinsing, and drying) -- 2  -EC --    Toileting (which includes using toilet bed pan or urinal) -- 2  -EC --    Putting on and taking off regular upper body clothing -- 3  -EC --    Taking care of personal grooming (such as brushing teeth) -- 3  -EC --    Eating meals -- 4  -EC --    AM-PAC 6 Clicks Score (OT) -- 16  -EC --       Functional Assessment    Outcome Measure Options AM-PAC 6 Clicks Basic Mobility (PT)  -KINGS AM-PAC 6 Clicks Daily Activity (OT)  -EC AM-PAC 6 Clicks Basic Mobility (PT)  -KINGS              User Key  (r) = Recorded By, (t) = Taken By, (c) = Cosigned By      Initials Name Provider Type    TS Carolee Giordano, GALLITO Occupational Therapist Assistant    Garcia Jaramillo, PTA  Physical Therapist Assistant    Ashley Nash, OTR/L Occupational Therapist                     Time Calculation:    PT Charges       Row Name 05/08/24 1438 05/08/24 0920          Time Calculation    Start Time 1438  -KINGS 0920  -KINGS     Stop Time 1502  -KINGS 0933  -KINGS     Time Calculation (min) 24 min  -KINGS 13 min  -KINGS     PT Received On 05/08/24  -KINGS 05/08/24  -KINGS        Time Calculation- PT    Total Timed Code Minutes- PT 24 minute(s)  -KINGS 13 minute(s)  -KINGS        Timed Charges    95723 - PT Therapeutic Exercise Minutes 10  -KINGS --     04586 - PT Therapeutic Activity Minutes 14  -KINGS 13  -KINGS        Total Minutes    Timed Charges Total Minutes 24  -KINGS 13  -KINGS      Total Minutes 24  -KINGS 13  -KINGS               User Key  (r) = Recorded By, (t) = Taken By, (c) = Cosigned By      Initials Name Provider Type    Garcia Jaramillo PTA Physical Therapist Assistant                  Therapy Charges for Today       Code Description Service Date Service Provider Modifiers Qty    40532155394 HC PT THERAPEUTIC ACT EA 15 MIN 5/7/2024 Garcia Francis, PTA GP 1    80243800963 HC PT THER PROC EA 15 MIN 5/7/2024 Garcia Francis, PTA GP 2    48720743029 HC PT THERAPEUTIC ACT EA 15 MIN 5/8/2024 Garcia Francis, PTA GP 1    30220139620 HC PT THERAPEUTIC ACT EA 15 MIN 5/8/2024 Garcia Francis, PTA GP 1    22495097073 HC PT THER PROC EA 15 MIN 5/8/2024 Garcia Francis, PTA GP 1            PT G-Codes  Outcome Measure Options: AM-PAC 6 Clicks Basic Mobility (PT)  AM-PAC 6 Clicks Score (PT): 12  AM-PAC 6 Clicks Score (OT): 18    Garcia Francis PTA  5/8/2024

## 2024-05-08 NOTE — PLAN OF CARE
Goal Outcome Evaluation:  Plan of Care Reviewed With: patient        Progress: improving  Outcome Evaluation: Patient A/O x 4. VSS. On room air. NSR on telemetry. Tolerating IV ABT. Patient showered yesterday. Purewick in place. Caregiver at bedside. Slept well last night. Safety maintained.

## 2024-05-08 NOTE — THERAPY TREATMENT NOTE
Acute Care - Occupational Therapy Treatment Note  [facility]     Patient Name: Tawny Shin  : 1955  MRN: [id number]  Today's Date: 2024     Date of Referral to OT: 24       Admit Date: 2024       ICD-10-CM ICD-9-CM   1. Weakness  R53.1 780.79   2. Urinary tract infection without hematuria, site unspecified  N39.0 599.0   3. Dysphagia, unspecified type [R13.10]  R13.10 787.20   4. Generalized weakness [R53.1]  R53.1 780.79     Patient Active Problem List   Diagnosis    T12 compression fracture, initial encounter    Chronic embolism and thrombosis of unspecified deep veins of left lower extremity    Chronic anticoagulation    Iron deficiency anemia    Osteoporosis    E coli bacteremia    Epidural hematoma    Pleural effusion, left    Functional neurological symptom disorder with weakness or paralysis    Overweight (BMI 25.0-29.9)    Rheumatoid arthritis involving multiple sites    (HFpEF) heart failure with preserved ejection fraction    Venous insufficiency    Coronary artery disease involving native coronary artery of native heart without angina pectoris    Sick sinus syndrome    Essential hypertension    Pressure injury of skin of heel    Chronic pain    Anemia of chronic disease    Cholelithiasis    Pressure injury of skin of buttock    Near functional paraplegia    Skin cancer    Squamous cell carcinoma of back    Hematoma    Right-sided chest wall pain    Hyperlipidemia LDL goal <70    Malodorous urine    UTI (urinary tract infection)    Stage 3b chronic kidney disease    Cyst of buttocks    Sepsis due to Escherichia coli without acute organ dysfunction    Generalized weakness    Paralysis    History of urinary retention    Bacteremia due to Enterobacter species    Bacteremia     Past Medical History:   Diagnosis Date    Age-related osteoporosis with current pathological fracture 2020    Arthritis     Asthma     Bilateral bunions 2020    Cancer     Cardiac pacemaker  "syndrome 12/23/2020    Overview:  - heart block - implanted 11/16    Charcot's joint of foot, left 12/23/2020    Chronic deep vein thrombosis (DVT) of right lower extremity 06/23/2021    Chronic pain syndrome 06/22/2021    Chronic sinusitis     COPD (chronic obstructive pulmonary disease)     Coronary artery disease     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    Elevated cholesterol     Eustachian tube dysfunction     Heart disease     Herpes simplex     History of transfusion     Hyperlipidemia     Hypertension     Hypothyroidism 12/23/2020    Intrinsic asthma 12/23/2020    Knee dislocation     Labral tear of right hip joint     Laryngitis sicca     Laryngitis, chronic     Left carotid bruit 03/09/2016    MI (myocardial infarction)     Myalgia due to statin 06/25/2019    Open wound of right hip 09/14/2021    Osteomyelitis of right femur 07/06/2021    Otorrhea     Pacemaker 11/17/2016    Primary osteoarthritis of left knee 12/23/2020    Psoriasis vulgaris 12/23/2020    S/P coronary artery stent placement 03/09/2016    Sensorineural hearing loss     Seropositive rheumatoid arthritis of multiple sites 12/27/2019    Overview:  -myochrysine '93-'96 -methotrexate '96--->11/98;r/s  restarted 2/99--> 8/14 (anemia) -sulfasalazine- not effective -penicillamine 6/98-->10/98; no effect -leflunomide 11/98--> - Humira '13-->didn't take - Enbrel 12/14-->3/15- no effect!   Last Assessment & Plan:  - \"aching all over\" because she had to be off her anti-rheumatic drugs for 2 weeks in preparation for her R knee surgery - he    Sick sinus syndrome 12/27/2019    Sjogren's disease     Spondylolisthesis of lumbar region 01/17/2018    Syncope, recurrent 02/08/2021    Urinary tract infection      Past Surgical History:   Procedure Laterality Date    A-V CARDIAC PACEMAKER INSERTION  2016    ATRIAL CARDIAC PACEMAKER INSERTION      CARDIAC CATHETERIZATION      CATARACT EXTRACTION      CERVICAL CORPECTOMY N/A 3/3/2021    Procedure: " CERVICAL 6 CORPECTOMY WITH TITANIUM CAGE WITH NEURO MONITORING;  Surgeon: Bandar Shea MD;  Location:  [facility];  Service: Neurosurgery;  Laterality: N/A;    COLONOSCOPY  11/08/2011    One fold in the ascending colon which showed ulcer otherwise normal exam    COLONOSCOPY  11/12/2004    Normal exam repeat in five years    CORONARY ANGIOPLASTY WITH STENT PLACEMENT      X 2; 2013 & 2014    ENDOSCOPY  07/10/2014    Normal exam    FLAP LEG Right 9/14/2021    Procedure: RIGHT GLUTEAL FASCIOCUTANEOUS ADVANCEMENT FLAP AND RIGHT TENSOR FASCIAL JESSICA FLAP;  Surgeon: Amadeo Turner MD;  Location:  [facility];  Service: Plastics;  Laterality: Right;    HIP ABDUCTION TENOTOMY BILATERAL Right 1/14/2021    Procedure: RIGHT HIP GLUTEUS MEDLUS / MINIMUS REPAIR, POSSIBLE ACHILLES ALLOGRAFT;  Surgeon: Nino Carlson MD;  Location:  [facility];  Service: Orthopedics;  Laterality: Right;    INCISION AND DRAINAGE ABSCESS Right 6/4/2022    Procedure: INCISION AND DRAINAGE ABSCESS right hip;  Surgeon: Magda Salcido MD;  Location:  [facility];  Service: General;  Laterality: Right;    INCISION AND DRAINAGE ABSCESS Right 6/10/2022    Procedure: RIGHT HIP INCISION AND DRAINAGE. MD NEEDS 3L VANC IRRIGATION, CURRETTES, DAICANS, KERLEX ROLLS;  Surgeon: Amadeo Turner MD;  Location:  [facility];  Service: Plastics;  Laterality: Right;    INCISION AND DRAINAGE HIP Right 2/9/2021    Procedure: HIP INCISION AND DRAINAGE;  Surgeon: Nino Carlson MD;  Location:  [facility];  Service: Orthopedics;  Laterality: Right;    INCISION AND DRAINAGE LEG Right 10/24/2021    Procedure: INCISION AND DRAINAGE LOWER EXTREMITY;  Surgeon: Amadeo Turner MD;  Location:  [facility];  Service: Plastics;  Laterality: Right;    INCISION AND DRAINAGE OF WOUND Right 7/8/2021    Procedure: INCISION AND DRAINAGE WOUND RIGHT HIP;  Surgeon: James Huntley MD;  Location:  [facility];  Service: Orthopedics;  Laterality: Right;    JOINT REPLACEMENT       KYPHOPLASTY WITH BIOPSY Bilateral 10/26/2021    Procedure: THOARCIC 12 KYPHOPLASTY WITH BIOPSY;  Surgeon: Bandar Shea MD;  Location:  [facility];  Service: Neurosurgery;  Laterality: Bilateral;    LEG DEBRIDEMENT Right 9/14/2021    Procedure: DEBRIDEMENT OF RIGHT HIP WOUND, RIGHT GLUTEAL FASCIOCUTANEOUS ADVANCEMENT FLAP AND RIGHT TENSOR FASCIAL JESSICA FLAP;  Surgeon: Amadeo Turner MD;  Location:  [facility];  Service: Plastics;  Laterality: Right;    LUMBAR DISCECTOMY Right 3/23/2021    Procedure: LUMBAR DISCECTOMY MICRO, Lumbar 1/2 right;  Surgeon: Bandar Shea MD;  Location:  [facility];  Service: Neurosurgery;  Laterality: Right;    LUMBAR FUSION N/A 1/19/2018    Procedure: L3-4,L4-5 DECOMPRESSION, POSTERIOR SPINAL FUSION WITH INSTRUMENTATION;  Surgeon: Fortino Oropeza MD;  Location:  [facility];  Service:     LUMBAR LAMINECTOMY WITH FUSION Left 1/17/2018    Procedure: LEFT L3-4 L4-5 LATERAL LUMBAR INTERBODY FUSION;  Surgeon: Fortino Oropeza MD;  Location:  [facility];  Service:     MASS EXCISION Right 4/23/2024    Procedure: RIGHT BUTTOCK MASS EXCISION;  Surgeon: Moises Keyes MD;  Location:  [facility];  Service: General;  Laterality: Right;    MYRINGOTOMY W/ TUBES  09/04/2014    TUBES NO LONGER IN PLACE    OTHER SURGICAL HISTORY      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 so hardware was removed and spacers were placed    REPLACEMENT TOTAL KNEE Right          OT ASSESSMENT FLOWSHEET (Last 12 Hours)       OT Evaluation and Treatment       Row Name 05/08/24 4636                   OT Time and Intention    Subjective Information complains of;fatigue  -TS        Document Type therapy note (daily note)  -TS        Mode of Treatment occupational therapy  -TS        Patient Effort good  -TS           General Information    Existing Precautions/Restrictions fall  -TS           Pain Assessment    Pretreatment Pain Rating 0/10 - no pain  -TS        Posttreatment Pain Rating 0/10 - no pain  -TS            Cognition    Personal Safety Interventions fall prevention program maintained;gait belt;nonskid shoes/slippers when out of bed  -TS           Bed Mobility    Sit-Supine [place] (Bed Mobility) minimum assist (75% patient effort)  -TS        Assistive Device (Bed Mobility) bed rails  -TS           Functional Mobility    Functional Mobility- Ind. Level standby assist  -TS        Functional Mobility- Device walker, front-wheeled  -TS           Transfer Assessment/Treatment    Transfers sit-stand transfer;stand-sit transfer  -TS           Bed-Chair Transfer    Comment, (Bed-Chair Transfer) multiple sets of sit to stands from recliner, CGA/min A  -TS           Chair-Bed Transfer    Chair-Bed [place] (Transfers) contact guard;minimum assist (75% patient effort)  -TS        Assistive Device (Chair-Bed Transfers) walker, front-wheeled  -TS           Sit-Stand Transfer    Sit-Stand [place] (Transfers) standby assist  -TS           Wound 04/23/24 0936 Right gluteal Incision    Wound - Properties Group Placement Date: 04/23/24  -EP Placement Time: 0936  -EP Present on Original Admission: N  -EP Side: Right  -EP Location: gluteal  -EP Primary Wound Type: Incision  -EP    Retired Wound - Properties Group Placement Date: 04/23/24  -EP Placement Time: 0936  -EP Present on Original Admission: N  -EP Side: Right  -EP Location: gluteal  -EP Primary Wound Type: Incision  -EP    Retired Wound - Properties Group Date first assessed: 04/23/24  -EP Time first assessed: 0936  -EP Present on Original Admission: N  -EP Side: Right  -EP Location: gluteal  -EP Primary Wound Type: Incision  -EP       Positioning and Restraints    Pre-Treatment Position sitting in chair/recliner  -TS        Post Treatment Position bed  -TS        In Bed fowlers;call light within reach;encouraged to call for assist;side rails up x2  -TS                  User Key  (r) = Recorded By, (t) = Taken By, (c) = Cosigned By      Initials Name Effective Dates     Carolee Roger COTA 02/03/23 -     Sonny Thompson RN 01/22/24 -                      Occupational Therapy Education       Title: PT OT SLP Therapies (In Progress)       Topic: Occupational Therapy (Done)       Point: ADL training (Done)       Description:   Instruct learner(s) on proper safety adaptation and remediation techniques during self care or transfers.   Instruct in proper use of assistive devices.                  Learning Progress Summary             Patient Acceptance, E, VU by EC at 5/3/2024 1245    Comment: role of OT, OT POC, body mechanics w bed mobility   Family Acceptance, E, VU by EC at 5/3/2024 1245    Comment: role of OT, OT POC, body mechanics w bed mobility                         Point: Home exercise program (Done)       Description:   Instruct learner(s) on appropriate technique for monitoring, assisting and/or progressing therapeutic exercises/activities.                  Learning Progress Summary             Patient Acceptance, E, VU by EC at 5/6/2024 1527                         Point: Precautions (Done)       Description:   Instruct learner(s) on prescribed precautions during self-care and functional transfers.                  Learning Progress Summary             Patient Acceptance, E, VU by EC at 5/6/2024 1527    Acceptance, E, VU by EC at 5/3/2024 1245    Comment: role of OT, OT POC, body mechanics w bed mobility   Family Acceptance, E, VU by EC at 5/3/2024 1245    Comment: role of OT, OT POC, body mechanics w bed mobility                         Point: Body mechanics (Done)       Description:   Instruct learner(s) on proper positioning and spine alignment during self-care, functional mobility activities and/or exercises.                  Learning Progress Summary             Patient Acceptance, E, VU by EC at 5/6/2024 1527    Acceptance, E, VU by EC at 5/3/2024 1245    Comment: role of OT, OT POC, body mechanics w bed mobility   Family Acceptance, E, VU by EC at  5/3/2024 9505    Comment: role of OT, OT POC, body mechanics w bed mobility                                         User Key       Initials Effective Dates Name Provider Type Discipline    EC 10/13/23 -  Ashley Martino, OTR/L Occupational Therapist OT                      OT Recommendation and Plan     Plan of Care Review  Plan of Care Reviewed With: patient  Progress: improving  Outcome Evaluation: Pt very excited about likely discharge Thursday after last round of antibiotics. Pt in room with caregiver present. Pt happy to work with therapy. Pt transfers x4 occurances through tx and ambulates in room around bed while sitting for rest break longterm through ambulation. Pt and CONTI discussed UE/back strengthening exercises to complete in bed. Pt very motivated to get back home and continue with her rehab. Continue OT POC  Plan of Care Reviewed With: patient  Outcome Evaluation: Pt very excited about likely discharge Thursday after last round of antibiotics. Pt in room with caregiver present. Pt happy to work with therapy. Pt transfers x4 occurances through tx and ambulates in room around bed while sitting for rest break longterm through ambulation. Pt and CONTI discussed UE/back strengthening exercises to complete in bed. Pt very motivated to get back home and continue with her rehab. Continue OT POC     Outcome Measures       Row Name 05/08/24 1500 05/08/24 0920 05/07/24 1400       How much help from another person do you currently need...    Turning from your back to your side while in flat bed without using bedrails? -- 3  -KINGS --    Moving from lying on back to sitting on the side of a flat bed without bedrails? -- 3  -KINGS --    Moving to and from a bed to a chair (including a wheelchair)? -- 2  -KINGS --    Standing up from a chair using your arms (e.g., wheelchair, bedside chair)? -- 2  -KINGS --    Climbing 3-5 steps with a railing? -- 1  -KINGS --    To walk in hospital room? -- 1  -KINGS --    AM-PAC 6 Clicks Score (PT) --  12  -KINGS --    Highest Level of Mobility Goal -- 4 --> Transfer to chair/commode  -KINGS --       How much help from another is currently needed...    Putting on and taking off regular lower body clothing? 2  -TS -- 2  -TS    Bathing (including washing, rinsing, and drying) 3  -TS -- 3  -TS    Toileting (which includes using toilet bed pan or urinal) 3  -TS -- 3  -TS    Putting on and taking off regular upper body clothing 3  -TS -- 3  -TS    Taking care of personal grooming (such as brushing teeth) 3  -TS -- 3  -TS    Eating meals 4  -TS -- 4  -TS    AM-PAC 6 Clicks Score (OT) 18  -TS -- 18  -TS       Functional Assessment    Outcome Measure Options -- AM-PAC 6 Clicks Basic Mobility (PT)  -KINGS --      Row Name 05/07/24 0924 05/06/24 1500 05/06/24 1057       How much help from another person do you currently need...    Turning from your back to your side while in flat bed without using bedrails? 3  -KINGS -- 3  -KINGS    Moving from lying on back to sitting on the side of a flat bed without bedrails? 3  -KINGS -- 3  -KINGS    Moving to and from a bed to a chair (including a wheelchair)? 2  -KINGS -- 2  -KINGS    Standing up from a chair using your arms (e.g., wheelchair, bedside chair)? 2  -KINGS -- 2  -KINGS    Climbing 3-5 steps with a railing? 1  -KINGS -- 1  -KINGS    To walk in hospital room? 1  -KINGS -- 1  -KINGS    AM-PAC 6 Clicks Score (PT) 12  -KINGS -- 12  -KINGS    Highest Level of Mobility Goal 4 --> Transfer to chair/commode  -KINGS -- 4 --> Transfer to chair/commode  -KINGS       How much help from another is currently needed...    Putting on and taking off regular lower body clothing? -- 2  -EC --    Bathing (including washing, rinsing, and drying) -- 2  -EC --    Toileting (which includes using toilet bed pan or urinal) -- 2  -EC --    Putting on and taking off regular upper body clothing -- 3  -EC --    Taking care of personal grooming (such as brushing teeth) -- 3  -EC --    Eating meals -- 4  -EC --    AM-PAC 6 Clicks Score (OT) -- 16  -EC --        Functional Assessment    Outcome Measure Options AM-PAC 6 Clicks Basic Mobility (PT)  -KINGS AM-PAC 6 Clicks Daily Activity (OT)  -EC AM-PAC 6 Clicks Basic Mobility (PT)  -KINGS              User Key  (r) = Recorded By, (t) = Taken By, (c) = Cosigned By      Initials Name Provider Type    TS Carolee Giordano COTA Occupational Therapist Assistant    Garcia Jaramillo, PTA Physical Therapist Assistant    Ashley Nash, OTR/L Occupational Therapist                    Time Calculation:    Time Calculation- OT       Row Name 05/08/24 1501             Time Calculation- OT    OT Start Time 1315  -TS      OT Stop Time 1400  -TS      OT Time Calculation (min) 45 min  -TS      Total Timed Code Minutes- OT 45 minute(s)  -TS      OT Received On 05/08/24  -TS         Timed Charges    59259 - OT Self Care/Mgmt Minutes 45  -TS         Total Minutes    Timed Charges Total Minutes 45  -TS       Total Minutes 45  -TS                User Key  (r) = Recorded By, (t) = Taken By, (c) = Cosigned By      Initials Name Provider Type     Carolee Giordano COTA Occupational Therapist Assistant                  Therapy Charges for Today       Code Description Service Date Service Provider Modifiers Qty    [id number] HC OT SELF CARE/MGMT/TRAIN EA 15 MIN 5/7/2024 Carolee Giordano COTA GO 3    06904338834 HC OT SELF CARE/MGMT/TRAIN EA 15 MIN 5/8/2024 Carolee Giordano COTA GO 3                 Carolee NIELSON. GALLITO Giordano  5/8/2024

## 2024-05-08 NOTE — PROGRESS NOTES
INFECTIOUS DISEASES PROGRESS NOTE    Patient:  Tawny Shin  YOB: 1955  MRN: [id number]   Admit date: 2024   Admitting Physician: Saulo Ambriz MD  Primary Care Physician: Moises Oliveira MD    Chief Complaint: left hand arthritis      Interval History: Patient indicates her left hand is hurting somewhat.  Unfortunately she is used to that with her rheumatoid arthritis.      Allergies:   Allergies   Allergen Reactions    Atorvastatin Other (See Comments)     LEG CRAMPS      Amoxicillin Rash    Escitalopram Rash    Nabumetone Rash    Niacin Er Rash    Penicillin G Rash    Penicillins Rash    Simvastatin Rash       Current Scheduled Medications:   apixaban, 5 mg, Oral, BID  aspirin, 81 mg, Oral, Daily  Biotene Dry Mouth Moisturizing, 1 spray, Mouth/Throat, 5x Daily  carvedilol, 12.5 mg, Oral, BID With Meals  cefepime, 2,000 mg, Intravenous, Q8H  DULoxetine, 60 mg, Oral, Daily  First Mouthwash (Magic Mouthwash), 10 mL, Swish & Spit, Q6H  folic acid, 1 mg, Oral, Daily  [Held by provider] furosemide, 40 mg, Oral, Daily  isosorbide mononitrate, 60 mg, Oral, Daily  leflunomide, 20 mg, Oral, Nightly  levothyroxine, 75 mcg, Oral, Q AM  losartan, 100 mg, Oral, Daily  [Held by provider] potassium chloride, 20 mEq, Oral, Daily  predniSONE, 5 mg, Oral, Daily  sodium chloride, 10 mL, Intravenous, Q12H      Current PRN Medications:    acetaminophen **OR** acetaminophen **OR** acetaminophen    senna-docusate sodium **AND** polyethylene glycol **AND** bisacodyl **AND** bisacodyl    calcium carbonate    Diclofenac Sodium    Lidocaine    magnesium hydroxide    nitroglycerin    ondansetron ODT **OR** ondansetron    [COMPLETED] Insert Peripheral IV **AND** sodium chloride    sodium chloride    sodium chloride    traMADol            Objective     Vital Signs:  Temp (24hrs), Av °F (36.7 °C), Min:97.6 °F (36.4 °C), Max:98.2 °F (36.8 °C)      /71 (BP Location: Right arm, Patient Position: Lying)    "Pulse 80   Temp 98.1 °F (36.7 °C) (Oral)   Resp 15   Ht 167.6 cm (66\")   Wt 97.6 kg (215 lb 2.7 oz)   LMP  (LMP Unknown)   SpO2 95%   BMI 34.73 kg/m²         Physical Exam:  General: Patient is up in chair.  Eating breakfast  Respiratory: Effort even and unlabored  Left hand patient has obvious ulnar deviation.  No erythema but some bogginess of the medic carpal phalangeal joint  Psych: Pleasant cooperative    Results Review    I reviewed the patient's new clinical results.    Lab Results:    CBC:   Lab Results   Lab 05/02/24  1905 05/03/24  0517 05/04/24  0518 05/05/24  0426 05/06/24  0445 05/07/24  0720 05/08/24 0444   WBC 8.03 7.86 5.29 4.69 4.56 5.01 5.60   HEMOGLOBIN 9.7* 9.5* 8.6* 8.7* 8.5* 8.8* 8.7*   HEMATOCRIT 31.6* 31.1* 27.8* 28.0* 28.2* 28.5* 28.4*   PLATELETS 168 149 127* 132* 141 146 155        AutoDiff:   Lab Results   Lab 05/04/24 0518 05/05/24 0426 05/06/24 0445   NEUTROPHIL % 53.9 47.9 43.0   LYMPHOCYTE % 25.5 32.2 39.0   MONOCYTES % 16.4* 14.1* 12.1*   EOSINOPHIL % 2.5 3.6 3.5   BASOPHIL % 0.8 1.1 1.1   NEUTROS ABS 2.85 2.25 1.96   LYMPHS ABS 1.35 1.51 1.78   MONOS ABS 0.87 0.66 0.55   EOS ABS 0.13 0.17 0.16   BASOS ABS 0.04 0.05 0.05        Manual Diff:    Lab Results   Lab 05/04/24 0518 05/05/24  0426 05/06/24 0445   NEUTROS ABS 2.85 2.25 1.96           CMP:   Lab Results   Lab 05/01/24  1344 05/02/24  1905 05/03/24  0518 05/06/24 0445 05/07/24  0720 05/08/24 0444   SODIUM 138 139   < > 140 141 142   POTASSIUM 3.1* 3.5   < > 4.0 4.3 4.3   CHLORIDE 98 99   < > 110* 110* 112*   CO2 24.4 29.0   < > 22.0 24.0 23.0   BUN 26* 29*   < > 18 15 15   CREATININE 1.58* 1.89*   < > 0.94 0.91 0.92   CALCIUM 9.3 9.3   < > 8.8 8.9 8.8   BILIRUBIN 0.3 0.4  --   --   --   --    ALK PHOS 76 68  --   --   --   --    ALT (SGPT) 24 24  --   --   --   --    AST (SGOT) 21 26  --   --   --   --    GLUCOSE 175* 110*   < > 85 82 82    < > = values in this interval not displayed.       Estimated Creatinine " Clearance: 67 mL/min (by C-G formula based on SCr of 0.92 mg/dL).    Culture Results:    Microbiology Results (last 10 days)       Procedure Component Value - Date/Time    Miscellaneous Micro Request - Specimen Not Sent, Specimen Not Sent [[id number]] Resulted: 05/07/24 1239    Lab Status: Final result Specimen: Specimen Not Sent Updated: 05/07/24 1239     Extra Tube --    Narrative:      Ceftaz/avibactam release.    Blood Culture With BREONNA - Blood, Arm, Left [[id number]]  (Normal) Collected: 05/03/24 1738    Lab Status: Preliminary result Specimen: Blood from Arm, Left Updated: 05/07/24 1815     Blood Culture No growth at 4 days    Blood Culture - Blood, Hand, Left [[id number]]  (Abnormal)  (Susceptibility) Collected: 05/02/24 2130    Lab Status: Edited Result - FINAL Specimen: Blood from Hand, Left Updated: 05/07/24 1239     Blood Culture Enterobacter cloacae complex CRE     Comment:   Carbapenem resistant Enterobacteriaceae, patient may be an isolation risk.  No carbapenemase detected.        Isolated from Pediatric Bottle     Gram Stain Pediatric Bottle Gram negative bacilli    Susceptibility        Enterobacter cloacae complex CRE      LICHA      Cefepime Susceptible      Ceftazidime + Avibactam Susceptible (C)  [1]       Gentamicin Susceptible      Imipenem Intermediate      Levofloxacin Resistant      Meropenem Resistant      Trimethoprim + Sulfamethoxazole Resistant                   [1]  Appended report. These results have been appended to a previously final verified report.                Susceptibility Comments       Enterobacter cloacae complex CRE    Cefazolin sensitivity will not be reported for Enterobacteriaceae in non-urine isolates. If cefazolin is preferred, please call the microbiology lab to request an E-test.  With the exception of urinary-sourced infections, aminoglycosides should not be used as monotherapy.               Blood Culture - Blood, Arm, Right [[id number]]  (Normal) Collected: 05/02/24  2130    Lab Status: Final result Specimen: Blood from Arm, Right Updated: 05/07/24 2146     Blood Culture No growth at 5 days    Blood Culture ID, PCR - Blood, Hand, Left [[id number]]  (Abnormal) Collected: 05/02/24 2130    Lab Status: Final result Specimen: Blood from Hand, Left Updated: 05/03/24 1229     BCID, PCR Enterobacter cloacae complex. Identification by BCID2 PCR.     BOTTLE TYPE Pediatric Bottle    Narrative:      No resistance genes detected.    Urine Culture - Urine, Straight Cath [[id number]]  (Abnormal)  (Susceptibility) Collected: 05/02/24 1956    Lab Status: Final result Specimen: Urine from Straight Cath Updated: 05/06/24 1257     Urine Culture >100,000 CFU/mL Enterobacter cloacae complex CRE     Comment:   No carbapenemase detected.  Consider infectious disease consult.  Susceptibility results may not correlate to clinical outcomes.  Carbapenem resistant Enterobacteriaceae, patient may be an isolation risk.       Narrative:      Colonization of the urinary tract without infection is common. Treatment is discouraged unless the patient is symptomatic, pregnant, or undergoing an invasive urologic procedure.    Susceptibility        Enterobacter cloacae complex CRE      LICHA      Amoxicillin + Clavulanate Resistant      Cefazolin Resistant      Cefepime Susceptible      Ceftazidime Resistant      Ceftriaxone Resistant      Gentamicin Susceptible      Levofloxacin Resistant      Nitrofurantoin Resistant      Piperacillin + Tazobactam Resistant      Trimethoprim + Sulfamethoxazole Resistant                           COVID-19, FLU A/B, RSV PCR 1 HR TAT - Swab, Nasopharynx [[id number]]  (Normal) Collected: 05/02/24 1826    Lab Status: Final result Specimen: Swab from Nasopharynx Updated: 05/02/24 1910     COVID19 Not Detected     Influenza A PCR Not Detected     Influenza B PCR Not Detected     RSV, PCR Not Detected    Narrative:      Fact sheet for providers: https://www.fda.gov/media/469302/download    Fact  sheet for patients: https://www.fda.gov/media/937687/download    Test performed by PCR.                 Radiology:   Imaging Results (Last 72 Hours)       ** No results found for the last 72 hours. **                Active Hospital Problems    Diagnosis     **Bacteremia due to Enterobacter species     Generalized weakness     History of urinary retention     Stage 3b chronic kidney disease     UTI (urinary tract infection)     Anemia of chronic disease     Coronary artery disease involving native coronary artery of native heart without angina pectoris     Sick sinus syndrome     Rheumatoid arthritis involving multiple sites     Chronic anticoagulation     Iron deficiency anemia     Chronic embolism and thrombosis of unspecified deep veins of left lower extremity        IMPRESSION:  Enterobacter cloacae bacteremia-CRE-secondary to urinary tract infection  Enterobacter cloacae in urine-CRE reported May 5.  Recent admission with E. coli bacteremia April 19.  This was attributed to urinary tract infection (urine culture reported as mixed brett) patient reported having urinary symptoms when she was home such as feeling like she had to urinate and could not void and also that she was not emptying completely.  She has had bladder scans here which reveal no to minimal urine.  She has a pure wick in place  History of prolonged MSSA bacteremia      RECOMMENDATION:   Continued leflunomide-discussed with Dr. Layo Chaidez yesterday and discussed with patient today.  Very long half-life so for   Continue cefepime-plan on 7 days total  Recommend discontinuing pure wick and making sure patient is able to void without any urinary symptoms and has a postvoid residual prior to discharge.  May need in and out catheterization to verify with bladder scan.    Continue to increase activity.  Will ask microbiology to test for additional antibiotic susceptibilities in case patient has recurrent infection that is then resistant to cefepime.   At least though susceptibilities will be available    Maggie Bang MD  05/08/24  08:47 CDT

## 2024-05-08 NOTE — THERAPY TREATMENT NOTE
Acute Care - Physical Therapy Treatment Note  [facility]     Patient Name: Tawny Shin  : 1955  MRN: [id number]  Today's Date: 2024      Visit Dx:     ICD-10-CM ICD-9-CM   1. Weakness  R53.1 780.79   2. Urinary tract infection without hematuria, site unspecified  N39.0 599.0   3. Dysphagia, unspecified type [R13.10]  R13.10 787.20   4. Generalized weakness [R53.1]  R53.1 780.79     Patient Active Problem List   Diagnosis    T12 compression fracture, initial encounter    Chronic embolism and thrombosis of unspecified deep veins of left lower extremity    Chronic anticoagulation    Iron deficiency anemia    Osteoporosis    E coli bacteremia    Epidural hematoma    Pleural effusion, left    Functional neurological symptom disorder with weakness or paralysis    Overweight (BMI 25.0-29.9)    Rheumatoid arthritis involving multiple sites    (HFpEF) heart failure with preserved ejection fraction    Venous insufficiency    Coronary artery disease involving native coronary artery of native heart without angina pectoris    Sick sinus syndrome    Essential hypertension    Pressure injury of skin of heel    Chronic pain    Anemia of chronic disease    Cholelithiasis    Pressure injury of skin of buttock    Near functional paraplegia    Skin cancer    Squamous cell carcinoma of back    Hematoma    Right-sided chest wall pain    Hyperlipidemia LDL goal <70    Malodorous urine    UTI (urinary tract infection)    Stage 3b chronic kidney disease    Cyst of buttocks    Sepsis due to Escherichia coli without acute organ dysfunction    Generalized weakness    Paralysis    History of urinary retention    Bacteremia due to Enterobacter species    Bacteremia     Past Medical History:   Diagnosis Date    Age-related osteoporosis with current pathological fracture 2020    Arthritis     Asthma     Bilateral bunions 2020    Cancer     Cardiac pacemaker syndrome 2020    Overview:  - heart block - implanted  "11/16    Charcot's joint of foot, left 12/23/2020    Chronic deep vein thrombosis (DVT) of right lower extremity 06/23/2021    Chronic pain syndrome 06/22/2021    Chronic sinusitis     COPD (chronic obstructive pulmonary disease)     Coronary artery disease     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    Elevated cholesterol     Eustachian tube dysfunction     Heart disease     Herpes simplex     History of transfusion     Hyperlipidemia     Hypertension     Hypothyroidism 12/23/2020    Intrinsic asthma 12/23/2020    Knee dislocation     Labral tear of right hip joint     Laryngitis sicca     Laryngitis, chronic     Left carotid bruit 03/09/2016    MI (myocardial infarction)     Myalgia due to statin 06/25/2019    Open wound of right hip 09/14/2021    Osteomyelitis of right femur 07/06/2021    Otorrhea     Pacemaker 11/17/2016    Primary osteoarthritis of left knee 12/23/2020    Psoriasis vulgaris 12/23/2020    S/P coronary artery stent placement 03/09/2016    Sensorineural hearing loss     Seropositive rheumatoid arthritis of multiple sites 12/27/2019    Overview:  -myochrysine '93-'96 -methotrexate '96--->11/98;r/s  restarted 2/99--> 8/14 (anemia) -sulfasalazine- not effective -penicillamine 6/98-->10/98; no effect -leflunomide 11/98--> - Humira '13-->didn't take - Enbrel 12/14-->3/15- no effect!   Last Assessment & Plan:  - \"aching all over\" because she had to be off her anti-rheumatic drugs for 2 weeks in preparation for her R knee surgery - he    Sick sinus syndrome 12/27/2019    Sjogren's disease     Spondylolisthesis of lumbar region 01/17/2018    Syncope, recurrent 02/08/2021    Urinary tract infection      Past Surgical History:   Procedure Laterality Date    A-V CARDIAC PACEMAKER INSERTION  2016    ATRIAL CARDIAC PACEMAKER INSERTION      CARDIAC CATHETERIZATION      CATARACT EXTRACTION      CERVICAL CORPECTOMY N/A 3/3/2021    Procedure: CERVICAL 6 CORPECTOMY WITH TITANIUM CAGE WITH NEURO MONITORING;  " Surgeon: Bandar Shea MD;  Location:  [facility];  Service: Neurosurgery;  Laterality: N/A;    COLONOSCOPY  11/08/2011    One fold in the ascending colon which showed ulcer otherwise normal exam    COLONOSCOPY  11/12/2004    Normal exam repeat in five years    CORONARY ANGIOPLASTY WITH STENT PLACEMENT      X 2; 2013 & 2014    ENDOSCOPY  07/10/2014    Normal exam    FLAP LEG Right 9/14/2021    Procedure: RIGHT GLUTEAL FASCIOCUTANEOUS ADVANCEMENT FLAP AND RIGHT TENSOR FASCIAL JESSICA FLAP;  Surgeon: Amadeo Turnre MD;  Location:  [facility];  Service: Plastics;  Laterality: Right;    HIP ABDUCTION TENOTOMY BILATERAL Right 1/14/2021    Procedure: RIGHT HIP GLUTEUS MEDLUS / MINIMUS REPAIR, POSSIBLE ACHILLES ALLOGRAFT;  Surgeon: Nino Carlson MD;  Location:  [facility];  Service: Orthopedics;  Laterality: Right;    INCISION AND DRAINAGE ABSCESS Right 6/4/2022    Procedure: INCISION AND DRAINAGE ABSCESS right hip;  Surgeon: Magda Salcido MD;  Location:  [facility];  Service: General;  Laterality: Right;    INCISION AND DRAINAGE ABSCESS Right 6/10/2022    Procedure: RIGHT HIP INCISION AND DRAINAGE. MD NEEDS 3L VANC IRRIGATION, CURRETTES, DAICANS, KERLEX ROLLS;  Surgeon: Amadeo Turner MD;  Location:  [facility];  Service: Plastics;  Laterality: Right;    INCISION AND DRAINAGE HIP Right 2/9/2021    Procedure: HIP INCISION AND DRAINAGE;  Surgeon: Nino Carlson MD;  Location:  [facility];  Service: Orthopedics;  Laterality: Right;    INCISION AND DRAINAGE LEG Right 10/24/2021    Procedure: INCISION AND DRAINAGE LOWER EXTREMITY;  Surgeon: Amadeo Turner MD;  Location:  [facility];  Service: Plastics;  Laterality: Right;    INCISION AND DRAINAGE OF WOUND Right 7/8/2021    Procedure: INCISION AND DRAINAGE WOUND RIGHT HIP;  Surgeon: James Huntley MD;  Location:  [facility];  Service: Orthopedics;  Laterality: Right;    JOINT REPLACEMENT      KYPHOPLASTY WITH BIOPSY Bilateral 10/26/2021    Procedure: THOARCIC 12  KYPHOPLASTY WITH BIOPSY;  Surgeon: Bandar Shea MD;  Location:  [facility];  Service: Neurosurgery;  Laterality: Bilateral;    LEG DEBRIDEMENT Right 9/14/2021    Procedure: DEBRIDEMENT OF RIGHT HIP WOUND, RIGHT GLUTEAL FASCIOCUTANEOUS ADVANCEMENT FLAP AND RIGHT TENSOR FASCIAL JESSICA FLAP;  Surgeon: Amadeo Turner MD;  Location:  [facility];  Service: Plastics;  Laterality: Right;    LUMBAR DISCECTOMY Right 3/23/2021    Procedure: LUMBAR DISCECTOMY MICRO, Lumbar 1/2 right;  Surgeon: Bandar Shea MD;  Location:  [facility];  Service: Neurosurgery;  Laterality: Right;    LUMBAR FUSION N/A 1/19/2018    Procedure: L3-4,L4-5 DECOMPRESSION, POSTERIOR SPINAL FUSION WITH INSTRUMENTATION;  Surgeon: Fortino Oropeza MD;  Location:  [facility];  Service:     LUMBAR LAMINECTOMY WITH FUSION Left 1/17/2018    Procedure: LEFT L3-4 L4-5 LATERAL LUMBAR INTERBODY FUSION;  Surgeon: Fortino Oropeza MD;  Location:  [facility];  Service:     MASS EXCISION Right 4/23/2024    Procedure: RIGHT BUTTOCK MASS EXCISION;  Surgeon: Moises Keyes MD;  Location:  [facility];  Service: General;  Laterality: Right;    MYRINGOTOMY W/ TUBES  09/04/2014    TUBES NO LONGER IN PLACE    OTHER SURGICAL HISTORY      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 so hardware was removed and spacers were placed    REPLACEMENT TOTAL KNEE Right      PT Assessment (Last 12 Hours)       PT Evaluation and Treatment       Row Name 05/08/24 0920          Physical Therapy Time and Intention    Subjective Information complains of;weakness;fatigue  not feeling well  -KINGS     Document Type therapy note (daily note)  -KINGS     Mode of Treatment physical therapy  -KINGS       Row Name 05/08/24 0920          General Information    Existing Precautions/Restrictions fall  -KINGS       Row Name 05/08/24 0920          Pain    Pretreatment Pain Rating 0/10 - no pain  -KINGS     Posttreatment Pain Rating 0/10 - no pain  -KINGS       Row Name 05/08/24 0920          Bed Mobility    Sit-Supine  [place] (Bed Mobility) verbal cues;minimum assist (75% patient effort);moderate assist (50% patient effort)  -KINGS     Assistive Device (Bed Mobility) bed rails;head of bed elevated  -KINGS       Row Name 05/08/24 0920          Transfers    Transfers chair-bed transfer  -KINGS       Row Name 05/08/24 0920          Chair-Bed Transfer    Chair-Bed [place] (Transfers) verbal cues;minimum assist (75% patient effort)  -KINGS     Assistive Device (Chair-Bed Transfers) walker, front-wheeled  -KINGS       Row Name 05/08/24 0920          Sit-Stand Transfer    Sit-Stand [place] (Transfers) verbal cues;minimum assist (75% patient effort)  -KINGS     Assistive Device (Sit-Stand Transfers) walker, front-wheeled  -KINGS       Row Name 05/08/24 0920          Stand-Sit Transfer    Stand-Sit [place] (Transfers) verbal cues;minimum assist (75% patient effort)  -KINGS     Assistive Device (Stand-Sit Transfers) walker, front-wheeled  -KINGS       Row Name             Wound 04/23/24 0936 Right gluteal Incision    Wound - Properties Group Placement Date: 04/23/24  -EP Placement Time: 0936  -EP Present on Original Admission: N  -EP Side: Right  -EP Location: gluteal  -EP Primary Wound Type: Incision  -EP    Retired Wound - Properties Group Placement Date: 04/23/24  -EP Placement Time: 0936  -EP Present on Original Admission: N  -EP Side: Right  -EP Location: gluteal  -EP Primary Wound Type: Incision  -EP    Retired Wound - Properties Group Date first assessed: 04/23/24  -EP Time first assessed: 0936  -EP Present on Original Admission: N  -EP Side: Right  -EP Location: gluteal  -EP Primary Wound Type: Incision  -EP      Row Name 05/08/24 0920          Positioning and Restraints    Pre-Treatment Position sitting in chair/recliner  -KINGS     Post Treatment Position bed  -KINGS     In Bed fowlers;call light within reach;encouraged to call for assist;with family/caregiver;side rails up x2  -KINGS               User Key  (r) = Recorded By, (t) = Taken By,  (c) = Cosigned By      Initials Name Provider Type    KINGS Garcia Francis, PTA Physical Therapist Assistant    Sonny Thompson, RN Registered Nurse                    Physical Therapy Education       Title: PT OT SLP Therapies (In Progress)       Topic: Physical Therapy (In Progress)       Point: Mobility training (Done)       Learning Progress Summary             Patient Acceptance, E, VU by MS at 5/5/2024 0910    Comment: role of PT in her care    Acceptance, E, VU by  at 5/2/2024 2240   Caregiver Acceptance, E, VU by  at 5/2/2024 2240                         Point: Home exercise program (Done)       Learning Progress Summary             Patient Acceptance, E, VU by  at 5/2/2024 2240   Caregiver Acceptance, E, VU by  at 5/2/2024 2240                         Point: Body mechanics (Not Started)       Learner Progress:  Not documented in this visit.              Point: Precautions (Done)       Learning Progress Summary             Patient Acceptance, E, VU by  at 5/2/2024 2240   Caregiver Acceptance, E, VU by  at 5/2/2024 2240                                         User Key       Initials Effective Dates Name Provider Type Discipline    MS 07/11/23 -  Nicole Olson, PT, DPT, NCS Physical Therapist PT     04/25/23 -  Rick Townsend, RN Registered Nurse Nurse                  PT Recommendation and Plan     Plan of Care Reviewed With: patient  Progress: improving  Outcome Evaluation: Pt was in bed, eager to get up.  Performed LE exercises AAROM.  Transfered supine to sitting with min assist.  Transfered sit to stand with min assist.  Pt improved with sit to stand today and able to maintain standing longer with RWX and CGA/min assist.  Pt took a few steps bed to chair with RWX and min assist.  Will continue to work with pt to increase strength and improve transfers.   Outcome Measures       Row Name 05/08/24 0920 05/07/24 1400 05/07/24 0924       How much help from another person do you currently need...     Turning from your back to your side while in flat bed without using bedrails? 3  -KINGS -- 3  -KINGS    Moving from lying on back to sitting on the side of a flat bed without bedrails? 3  -KINGS -- 3  -KINGS    Moving to and from a bed to a chair (including a wheelchair)? 2  -KINGS -- 2  -KINGS    Standing up from a chair using your arms (e.g., wheelchair, bedside chair)? 2  -KINGS -- 2  -KINGS    Climbing 3-5 steps with a railing? 1  -KINGS -- 1  -KINGS    To walk in hospital room? 1  -KINGS -- 1  -KINGS    AM-PAC 6 Clicks Score (PT) 12  -KINGS -- 12  -KINGS    Highest Level of Mobility Goal 4 --> Transfer to chair/commode  -KINGS -- 4 --> Transfer to chair/commode  -KINGS       How much help from another is currently needed...    Putting on and taking off regular lower body clothing? -- 2  -TS --    Bathing (including washing, rinsing, and drying) -- 3  -TS --    Toileting (which includes using toilet bed pan or urinal) -- 3  -TS --    Putting on and taking off regular upper body clothing -- 3  -TS --    Taking care of personal grooming (such as brushing teeth) -- 3  -TS --    Eating meals -- 4  -TS --    AM-PAC 6 Clicks Score (OT) -- 18  -TS --       Functional Assessment    Outcome Measure Options AM-[facility] 6 Clicks Basic Mobility (PT)  -KINGS -- -[facility] 6 Clicks Basic Mobility (PT)  -KINGS      Row Name 05/06/24 1500 05/06/24 1057          How much help from another person do you currently need...    Turning from your back to your side while in flat bed without using bedrails? -- 3  -KINGS     Moving from lying on back to sitting on the side of a flat bed without bedrails? -- 3  -KINGS     Moving to and from a bed to a chair (including a wheelchair)? -- 2  -KINGS     Standing up from a chair using your arms (e.g., wheelchair, bedside chair)? -- 2  -KINGS     Climbing 3-5 steps with a railing? -- 1  -KINGS     To walk in hospital room? -- 1  -KINGS     AM-PAC 6 Clicks Score (PT) -- 12  -KINGS     Highest Level of Mobility Goal -- 4 --> Transfer to chair/commode  -KINGS        How much help  from another is currently needed...    Putting on and taking off regular lower body clothing? 2  -EC --     Bathing (including washing, rinsing, and drying) 2  -EC --     Toileting (which includes using toilet bed pan or urinal) 2  -EC --     Putting on and taking off regular upper body clothing 3  -EC --     Taking care of personal grooming (such as brushing teeth) 3  -EC --     Eating meals 4  -EC --     AM-PAC 6 Clicks Score (OT) 16  -EC --        Functional Assessment    Outcome Measure Options AM-PAC 6 Clicks Daily Activity (OT)  -EC AM-PAC 6 Clicks Basic Mobility (PT)  -KINGS               User Key  (r) = Recorded By, (t) = Taken By, (c) = Cosigned By      Initials Name Provider Type    Carolee Roger COTA Occupational Therapist Assistant    Garcia Jaramillo PTA Physical Therapist Assistant    Ashley Nash, OTR/L Occupational Therapist                     Time Calculation:    PT Charges       Row Name 05/08/24 0920             Time Calculation    Start Time 0920  -KINGS      Stop Time 0933  -KINGS      Time Calculation (min) 13 min  -KINGS      PT Received On 05/08/24  -KINGS         Time Calculation- PT    Total Timed Code Minutes- PT 13 minute(s)  -KINGS         Timed Charges    72735 - PT Therapeutic Activity Minutes 13  -KINGS         Total Minutes    Timed Charges Total Minutes 13  -KINGS       Total Minutes 13  -KINGS                User Key  (r) = Recorded By, (t) = Taken By, (c) = Cosigned By      Initials Name Provider Type    Garcia Jaramillo PTA Physical Therapist Assistant                  Therapy Charges for Today       Code Description Service Date Service Provider Modifiers Qty    57363700842 HC PT THERAPEUTIC ACT EA 15 MIN 5/7/2024 Garcia Francis, PTA GP 1    62832589335 HC PT THER PROC EA 15 MIN 5/7/2024 Garcia Francis, PTA GP 2    27998672225 HC PT THERAPEUTIC ACT EA 15 MIN 5/8/2024 Garcia Francis, PTA GP 1            PT G-Codes  Outcome Measure Options: AM-PAC 6 Clicks Basic Mobility  (PT)  AM-PAC 6 Clicks Score (PT): 12  AM-PAC 6 Clicks Score (OT): 18    Garcia Francis, PTA  5/8/2024

## 2024-05-08 NOTE — PROGRESS NOTES
[facility] Medicine Services  INPATIENT PROGRESS NOTE    Patient Name: Tawny Shin  Date of Admission: 5/2/2024  Today's Date: 05/08/24  Length of Stay: 5  Primary Care Physician: Moises Oliveira MD    Subjective   Chief Complaint: Bacteremia/UTI.   HPI   Last day of antibiotic is tomorrow discussed with patient blood pressure stable, afebrile.  Patient doing well.  White blood cells normal.  Hemoglobin stable.    Review of Systems   Constitutional:  Positive for activity change, appetite change and fatigue. Negative for chills and fever.   HENT:  Negative for hearing loss, nosebleeds, tinnitus and trouble swallowing.    Eyes:  Negative for visual disturbance.   Respiratory:  Negative for cough, chest tightness, shortness of breath and wheezing.    Cardiovascular:  Negative for chest pain, palpitations and leg swelling.   Gastrointestinal:  Negative for abdominal distention, abdominal pain, blood in stool, constipation, diarrhea, nausea and vomiting.   Endocrine: Negative for cold intolerance, heat intolerance, polydipsia, polyphagia and polyuria.   Genitourinary:  Negative for decreased urine volume, difficulty urinating, dysuria, flank pain, frequency and hematuria.   Musculoskeletal:  Positive for arthralgias, gait problem and myalgias. Negative for joint swelling.   Skin:  Negative for rash.   Allergic/Immunologic: Negative for immunocompromised state.   Neurological:  Positive for weakness. Negative for dizziness, syncope, light-headedness and headaches.   Hematological:  Negative for adenopathy. Does not bruise/bleed easily.   Psychiatric/Behavioral:  Negative for confusion and sleep disturbance. The patient is not nervous/anxious.   All pertinent negatives and positives are as above. All other systems have been reviewed and are negative unless otherwise stated.     Objective    Temp:  [97.6 °F (36.4 °C)-98.2 °F (36.8 °C)] 98.1 °F (36.7 °C)  Heart Rate:  [71-80]  80  Resp:  [15-18] 15  BP: (106-174)/(58-89) 118/71  Physical Exam  Vitals and nursing note reviewed.   Constitutional:       Comments: Chronically ill.   HENT:      Head: Normocephalic.   Eyes:      Conjunctiva/sclera: Conjunctivae normal.      Pupils: Pupils are equal, round, and reactive to light.   Cardiovascular:      Rate and Rhythm: Normal rate and regular rhythm.      Heart sounds: Normal heart sounds.   Pulmonary:      Effort: Pulmonary effort is normal. No respiratory distress.      Breath sounds: Normal breath sounds.   Abdominal:      General: Bowel sounds are normal. There is no distension.      Palpations: Abdomen is soft.      Tenderness: There is no abdominal tenderness.      Comments: Obesity .   Musculoskeletal:         General: No swelling.      Cervical back: Neck supple.      Comments: Deformities of the hand from arthritis.   Skin:     General: Skin is warm and dry.      Capillary Refill: Capillary refill takes 2 to 3 seconds.      Findings: No rash.   Neurological:      Mental Status: She is alert and oriented to person, place, and time.      Motor: Weakness present.      Coordination: Coordination abnormal.      Gait: Gait abnormal.   Psychiatric:         Mood and Affect: Mood normal.         Behavior: Behavior normal.         Thought Content: Thought content normal.       Results Review:  I have reviewed the labs, radiology results, and diagnostic studies.    Laboratory Data:   Results from last 7 days   Lab Units 05/08/24 0444 05/07/24 0720 05/06/24  0445   WBC 10*3/mm3 5.60 5.01 4.56   HEMOGLOBIN g/dL 8.7* 8.8* 8.5*   HEMATOCRIT % 28.4* 28.5* 28.2*   PLATELETS 10*3/mm3 155 146 141        Results from last 7 days   Lab Units 05/08/24  0444 05/07/24  0720 05/06/24  0445 05/03/24  0518 05/02/24  1905 05/01/24  1344   SODIUM mmol/L 142 141 140   < > 139 138   POTASSIUM mmol/L 4.3 4.3 4.0   < > 3.5 3.1*   CHLORIDE mmol/L 112* 110* 110*   < > 99 98   CO2 mmol/L 23.0 24.0 22.0   < > 29.0 24.4    BUN mg/dL 15 15 18   < > 29* 26*   CREATININE mg/dL 0.92 0.91 0.94   < > 1.89* 1.58*   CALCIUM mg/dL 8.8 8.9 8.8   < > 9.3 9.3   BILIRUBIN mg/dL  --   --   --   --  0.4 0.3   ALK PHOS U/L  --   --   --   --  68 76   ALT (SGPT) U/L  --   --   --   --  24 24   AST (SGOT) U/L  --   --   --   --  26 21   GLUCOSE mg/dL 82 82 85   < > 110* 175*    < > = values in this interval not displayed.       Culture Data:   Blood Culture   Date Value Ref Range Status   05/03/2024 No growth at 4 days  Preliminary   05/02/2024 Enterobacter cloacae complex CRE (C)  Final     Comment:       Carbapenem resistant Enterobacteriaceae, patient may be an isolation risk.  No carbapenemase detected.   05/02/2024 No growth at 5 days  Final     Urine Culture   Date Value Ref Range Status   05/02/2024 >100,000 CFU/mL Enterobacter cloacae complex CRE (A)  Final     Comment:       No carbapenemase detected.  Consider infectious disease consult.  Susceptibility results may not correlate to clinical outcomes.  Carbapenem resistant Enterobacteriaceae, patient may be an isolation risk.       Radiology Data:   Imaging Results (Last 24 Hours)       ** No results found for the last 24 hours. **            I have reviewed the patient's current medications.     Assessment/Plan   Assessment  Active Hospital Problems    Diagnosis     **Bacteremia due to Enterobacter species     Generalized weakness     History of urinary retention     Stage 3b chronic kidney disease     UTI (urinary tract infection)     Anemia of chronic disease     Coronary artery disease involving native coronary artery of native heart without angina pectoris     Sick sinus syndrome     Rheumatoid arthritis involving multiple sites     Chronic anticoagulation     Iron deficiency anemia     Chronic embolism and thrombosis of unspecified deep veins of left lower extremity        Treatment Plan  UTI/bacteremia.  ID consult.  Cefepime.     Chronic stage IIIb renal failure.  Creatinine  normalized.     Rheumatoid arthritis/Hx Sjogren .  Biotene.  Magic mouthwash. Arava.  Prednisone daily.     History of DVT . Eliquis.     CAD/hypertension/hyperlipidemia.  Aspirin. . Hold Lasix.  Imdur .  Continue Cozaar.  Nitro as needed.     Depression/anxiety.  Cymbalta.     Hypokalemia.  Normal.  Hold p.o. potassium daily.     Anemia.  Hemoglobin stable.  No sign of acute bleed.     Thrombocytopenia.  Platelet count is normal.     Hypothyroidism . Synthroid.  High TSH.  Free T4 normal     Reflux.  Tums.  Zofran as needed.     Constipation.  Magnesium oxide as needed.  Constipation protocol  as needed.  .     Pain.  Voltaren gel as needed.  Lidocaine patch as needed.  Ultram as needed.     Nutrition . Folic acid.  Cardiac diet.  Boost supplement.     Deconditioning.  PT and OT consult.  Patient get around with a walker at baseline.     Blood culture- Enterobacter cloacae complex CRE  . Urine culture-Enterobacter cloacae complex CRE.     Dr. Shin's mom.     Medical Decision Making  Number and Complexity of problems: UTI/bacteremia/chronic stage IIIb renal failure/rheumatoid arthritis/CAD/hypertension  Differential Diagnosis: None     Conditions and Status        Condition is unchanged.     MDM Data  External documents reviewed: Previous note  Cardiac tracing (EKG, telemetry) interpretation: Sinus  Radiology interpretation: None  Labs reviewed: Laboratory  Any tests that were considered but not ordered: Lab in a.m.     Decision rules/scores evaluated (example [id number], Wells, etc): None     Discussed with: Patient and caregiver     Care Planning  Shared decision making: Patient and caregiver  Code status and discussions: Full code     Disposition  Social Determinants of Health that impact treatment or disposition: From home  Plan for possible discharge Thursday evening after last dose of antibiotics.       Electronically signed by Saulo Ambriz MD, 05/08/24, 11:36 CDT.

## 2024-05-09 ENCOUNTER — READMISSION MANAGEMENT (OUTPATIENT)
Dept: CALL CENTER | Facility: HOSPITAL | Age: 69
End: 2024-05-09
Payer: MEDICARE

## 2024-05-09 ENCOUNTER — TELEPHONE (OUTPATIENT)
Dept: INTERNAL MEDICINE | Facility: CLINIC | Age: 69
End: 2024-05-09

## 2024-05-09 VITALS
WEIGHT: 217.6 LBS | DIASTOLIC BLOOD PRESSURE: 54 MMHG | BODY MASS INDEX: 34.97 KG/M2 | SYSTOLIC BLOOD PRESSURE: 146 MMHG | RESPIRATION RATE: 18 BRPM | TEMPERATURE: 98 F | HEIGHT: 66 IN | OXYGEN SATURATION: 96 % | HEART RATE: 81 BPM

## 2024-05-09 LAB
ANION GAP SERPL CALCULATED.3IONS-SCNC: 10 MMOL/L (ref 5–15)
BACTERIA SPEC AEROBE CULT: NO GROWTH
BACTERIA UR QL AUTO: ABNORMAL /HPF
BILIRUB UR QL STRIP: NEGATIVE
BUN SERPL-MCNC: 14 MG/DL (ref 8–23)
BUN/CREAT SERPL: 14.6 (ref 7–25)
CALCIUM SPEC-SCNC: 9.2 MG/DL (ref 8.6–10.5)
CHLORIDE SERPL-SCNC: 109 MMOL/L (ref 98–107)
CLARITY UR: CLEAR
CO2 SERPL-SCNC: 23 MMOL/L (ref 22–29)
COLOR UR: YELLOW
CREAT SERPL-MCNC: 0.96 MG/DL (ref 0.57–1)
DEPRECATED RDW RBC AUTO: 60.5 FL (ref 37–54)
EGFRCR SERPLBLD CKD-EPI 2021: 63.8 ML/MIN/1.73
ERYTHROCYTE [DISTWIDTH] IN BLOOD BY AUTOMATED COUNT: 17.1 % (ref 12.3–15.4)
GLUCOSE SERPL-MCNC: 86 MG/DL (ref 65–99)
GLUCOSE UR STRIP-MCNC: NEGATIVE MG/DL
HCT VFR BLD AUTO: 31.6 % (ref 34–46.6)
HGB BLD-MCNC: 9.4 G/DL (ref 12–15.9)
HGB UR QL STRIP.AUTO: ABNORMAL
HYALINE CASTS UR QL AUTO: ABNORMAL /LPF
KETONES UR QL STRIP: ABNORMAL
LEUKOCYTE ESTERASE UR QL STRIP.AUTO: ABNORMAL
MCH RBC QN AUTO: 29.2 PG (ref 26.6–33)
MCHC RBC AUTO-ENTMCNC: 29.7 G/DL (ref 31.5–35.7)
MCV RBC AUTO: 98.1 FL (ref 79–97)
NITRITE UR QL STRIP: NEGATIVE
PH UR STRIP.AUTO: 5.5 [PH] (ref 5–8)
PLATELET # BLD AUTO: 181 10*3/MM3 (ref 140–450)
PMV BLD AUTO: 11.4 FL (ref 6–12)
POTASSIUM SERPL-SCNC: 4.1 MMOL/L (ref 3.5–5.2)
PROT UR QL STRIP: ABNORMAL
RBC # BLD AUTO: 3.22 10*6/MM3 (ref 3.77–5.28)
RBC # UR STRIP: ABNORMAL /HPF
REF LAB TEST METHOD: ABNORMAL
SODIUM SERPL-SCNC: 142 MMOL/L (ref 136–145)
SP GR UR STRIP: 1.02 (ref 1–1.03)
SQUAMOUS #/AREA URNS HPF: ABNORMAL /HPF
UROBILINOGEN UR QL STRIP: ABNORMAL
WBC # UR STRIP: ABNORMAL /HPF
WBC NRBC COR # BLD AUTO: 5.88 10*3/MM3 (ref 3.4–10.8)
YEAST URNS QL MICRO: ABNORMAL /HPF

## 2024-05-09 PROCEDURE — 97110 THERAPEUTIC EXERCISES: CPT

## 2024-05-09 PROCEDURE — 25010000002 CEFEPIME PER 500 MG: Performed by: INTERNAL MEDICINE

## 2024-05-09 PROCEDURE — 81001 URINALYSIS AUTO W/SCOPE: CPT | Performed by: FAMILY MEDICINE

## 2024-05-09 PROCEDURE — 87086 URINE CULTURE/COLONY COUNT: CPT | Performed by: FAMILY MEDICINE

## 2024-05-09 PROCEDURE — 97535 SELF CARE MNGMENT TRAINING: CPT

## 2024-05-09 PROCEDURE — 63710000001 PREDNISONE PER 5 MG: Performed by: NURSE PRACTITIONER

## 2024-05-09 PROCEDURE — 99231 SBSQ HOSP IP/OBS SF/LOW 25: CPT | Performed by: INTERNAL MEDICINE

## 2024-05-09 PROCEDURE — 97530 THERAPEUTIC ACTIVITIES: CPT

## 2024-05-09 PROCEDURE — 85027 COMPLETE CBC AUTOMATED: CPT | Performed by: FAMILY MEDICINE

## 2024-05-09 PROCEDURE — 80048 BASIC METABOLIC PNL TOTAL CA: CPT | Performed by: FAMILY MEDICINE

## 2024-05-09 RX ORDER — CARVEDILOL 25 MG/1
25 TABLET ORAL 2 TIMES DAILY WITH MEALS
Status: DISCONTINUED | OUTPATIENT
Start: 2024-05-09 | End: 2024-05-09 | Stop reason: HOSPADM

## 2024-05-09 RX ADMIN — ASPIRIN 81 MG: 81 TABLET, COATED ORAL at 10:20

## 2024-05-09 RX ADMIN — DULOXETINE HYDROCHLORIDE 60 MG: 30 CAPSULE, DELAYED RELEASE ORAL at 10:20

## 2024-05-09 RX ADMIN — Medication 10 ML: at 10:24

## 2024-05-09 RX ADMIN — DIPHENHYDRAMINE HYDROCHLORIDE AND LIDOCAINE HYDROCHLORIDE AND ALUMINUM HYDROXIDE AND MAGNESIUM HYDRO 10 ML: KIT at 18:35

## 2024-05-09 RX ADMIN — CARVEDILOL 25 MG: 25 TABLET, FILM COATED ORAL at 18:35

## 2024-05-09 RX ADMIN — APIXABAN 5 MG: 5 TABLET, FILM COATED ORAL at 10:20

## 2024-05-09 RX ADMIN — Medication 1 SPRAY: at 10:24

## 2024-05-09 RX ADMIN — PREDNISONE 5 MG: 5 TABLET ORAL at 10:20

## 2024-05-09 RX ADMIN — LEVOTHYROXINE SODIUM 75 MCG: 75 TABLET ORAL at 06:05

## 2024-05-09 RX ADMIN — DIPHENHYDRAMINE HYDROCHLORIDE AND LIDOCAINE HYDROCHLORIDE AND ALUMINUM HYDROXIDE AND MAGNESIUM HYDRO 10 ML: KIT at 12:16

## 2024-05-09 RX ADMIN — DIPHENHYDRAMINE HYDROCHLORIDE AND LIDOCAINE HYDROCHLORIDE AND ALUMINUM HYDROXIDE AND MAGNESIUM HYDRO 10 ML: KIT at 06:05

## 2024-05-09 RX ADMIN — Medication 1 SPRAY: at 14:04

## 2024-05-09 RX ADMIN — LOSARTAN POTASSIUM 100 MG: 50 TABLET, FILM COATED ORAL at 10:20

## 2024-05-09 RX ADMIN — FOLIC ACID 1 MG: 1 TABLET ORAL at 10:20

## 2024-05-09 RX ADMIN — ISOSORBIDE MONONITRATE 60 MG: 60 TABLET, EXTENDED RELEASE ORAL at 10:20

## 2024-05-09 RX ADMIN — CEFEPIME 2000 MG: 2 INJECTION, POWDER, FOR SOLUTION INTRAVENOUS at 16:05

## 2024-05-09 RX ADMIN — CARVEDILOL 12.5 MG: 6.25 TABLET, FILM COATED ORAL at 10:20

## 2024-05-09 RX ADMIN — Medication 1 SPRAY: at 18:36

## 2024-05-09 RX ADMIN — CEFEPIME 2000 MG: 2 INJECTION, POWDER, FOR SOLUTION INTRAVENOUS at 09:30

## 2024-05-09 RX ADMIN — CEFEPIME 2000 MG: 2 INJECTION, POWDER, FOR SOLUTION INTRAVENOUS at 01:26

## 2024-05-09 NOTE — PROGRESS NOTES
"INFECTIOUS DISEASES PROGRESS NOTE    Patient:  Tawny Shin  YOB: 1955  MRN: [id number]   Admit date: 2024   Admitting Physician: Saulo Ambriz MD  Primary Care Physician: Moises Oliveira MD    Chief Complaint: Anxious to go home    Interval History: Patient is getting up to the chair with nursing.  Her pure wick is going to be removed today to make sure patient can void.    Allergies:   Allergies   Allergen Reactions    Atorvastatin Other (See Comments)     LEG CRAMPS      Amoxicillin Rash    Escitalopram Rash    Nabumetone Rash    Niacin Er Rash    Penicillin G Rash    Penicillins Rash    Simvastatin Rash       Current Scheduled Medications:   apixaban, 5 mg, Oral, BID  aspirin, 81 mg, Oral, Daily  Biotene Dry Mouth Moisturizing, 1 spray, Mouth/Throat, 5x Daily  carvedilol, 12.5 mg, Oral, BID With Meals  cefepime, 2,000 mg, Intravenous, Q8H  DULoxetine, 60 mg, Oral, Daily  First Mouthwash (Magic Mouthwash), 10 mL, Swish & Spit, Q6H  folic acid, 1 mg, Oral, Daily  isosorbide mononitrate, 60 mg, Oral, Daily  leflunomide, 20 mg, Oral, Nightly  levothyroxine, 75 mcg, Oral, Q AM  losartan, 100 mg, Oral, Daily  predniSONE, 5 mg, Oral, Daily  sodium chloride, 10 mL, Intravenous, Q12H      Current PRN Medications:    acetaminophen **OR** acetaminophen **OR** acetaminophen    senna-docusate sodium **AND** polyethylene glycol **AND** bisacodyl **AND** bisacodyl    calcium carbonate    Diclofenac Sodium    Lidocaine    magnesium hydroxide    nitroglycerin    ondansetron ODT **OR** ondansetron    [COMPLETED] Insert Peripheral IV **AND** sodium chloride    sodium chloride    sodium chloride    traMADol            Objective     Vital Signs:  Temp (24hrs), Av.1 °F (36.7 °C), Min:98.1 °F (36.7 °C), Max:98.3 °F (36.8 °C)      /78 (BP Location: Left arm, Patient Position: Lying)   Pulse 77   Temp 98.1 °F (36.7 °C) (Oral)   Resp 18   Ht 167.6 cm (66\")   Wt 98.7 kg (217 lb 9.6 oz)   LMP  " (LMP Unknown)   SpO2 95%   BMI 35.12 kg/m²         Physical Exam:  General: The patient is nontoxic-appearing sitting up at bedside no acute distress  Respiratory: Effort even and unlabored, she not conversationally dyspneic  Right upper extremity peripheral IV without any evidence of phlebitis      I reviewed the patient's new clinical results.    Lab Results:    CBC:   Lab Results   Lab 05/02/24 1905 05/03/24 0517 05/04/24 0518 05/05/24 0426 05/06/24 0445 05/07/24 0720 05/08/24 0444   WBC 8.03 7.86 5.29 4.69 4.56 5.01 5.60   HEMOGLOBIN 9.7* 9.5* 8.6* 8.7* 8.5* 8.8* 8.7*   HEMATOCRIT 31.6* 31.1* 27.8* 28.0* 28.2* 28.5* 28.4*   PLATELETS 168 149 127* 132* 141 146 155        AutoDiff:   Lab Results   Lab 05/04/24 0518 05/05/24 0426 05/06/24 0445   NEUTROPHIL % 53.9 47.9 43.0   LYMPHOCYTE % 25.5 32.2 39.0   MONOCYTES % 16.4* 14.1* 12.1*   EOSINOPHIL % 2.5 3.6 3.5   BASOPHIL % 0.8 1.1 1.1   NEUTROS ABS 2.85 2.25 1.96   LYMPHS ABS 1.35 1.51 1.78   MONOS ABS 0.87 0.66 0.55   EOS ABS 0.13 0.17 0.16   BASOS ABS 0.04 0.05 0.05        Manual Diff:    Lab Results   Lab 05/04/24 0518 05/05/24 0426 05/06/24 0445   NEUTROS ABS 2.85 2.25 1.96           CMP:   Lab Results   Lab 05/02/24 1905 05/03/24 0518 05/06/24 0445 05/07/24 0720 05/08/24 0444   SODIUM 139   < > 140 141 142   POTASSIUM 3.5   < > 4.0 4.3 4.3   CHLORIDE 99   < > 110* 110* 112*   CO2 29.0   < > 22.0 24.0 23.0   BUN 29*   < > 18 15 15   CREATININE 1.89*   < > 0.94 0.91 0.92   CALCIUM 9.3   < > 8.8 8.9 8.8   BILIRUBIN 0.4  --   --   --   --    ALK PHOS 68  --   --   --   --    ALT (SGPT) 24  --   --   --   --    AST (SGOT) 26  --   --   --   --    GLUCOSE 110*   < > 85 82 82    < > = values in this interval not displayed.       Estimated Creatinine Clearance: 67.5 mL/min (by C-G formula based on SCr of 0.92 mg/dL).    Culture Results:    Microbiology Results (last 10 days)       Procedure Component Value - Date/Time    Miscellaneous Micro Request  - Specimen Not Sent, Specimen Not Sent [[id number]] Resulted: 05/07/24 1239    Lab Status: Final result Specimen: Specimen Not Sent Updated: 05/07/24 1239     Extra Tube --    Narrative:      Ceftaz/avibactam release.    Blood Culture With BREONNA - Blood, Arm, Left [[id number]]  (Normal) Collected: 05/03/24 1738    Lab Status: Final result Specimen: Blood from Arm, Left Updated: 05/08/24 1815     Blood Culture No growth at 5 days    Blood Culture - Blood, Hand, Left [[id number]]  (Abnormal)  (Susceptibility) Collected: 05/02/24 2130    Lab Status: Edited Result - FINAL Specimen: Blood from Hand, Left Updated: 05/07/24 1239     Blood Culture Enterobacter cloacae complex CRE     Comment:   Carbapenem resistant Enterobacteriaceae, patient may be an isolation risk.  No carbapenemase detected.        Isolated from Pediatric Bottle     Gram Stain Pediatric Bottle Gram negative bacilli    Susceptibility        Enterobacter cloacae complex CRE      LICHA      Cefepime Susceptible      Ceftazidime + Avibactam Susceptible (C)  [1]       Gentamicin Susceptible      Imipenem Intermediate      Levofloxacin Resistant      Meropenem Resistant      Trimethoprim + Sulfamethoxazole Resistant                   [1]  Appended report. These results have been appended to a previously final verified report.                Susceptibility Comments       Enterobacter cloacae complex CRE    Cefazolin sensitivity will not be reported for Enterobacteriaceae in non-urine isolates. If cefazolin is preferred, please call the microbiology lab to request an E-test.  With the exception of urinary-sourced infections, aminoglycosides should not be used as monotherapy.               Blood Culture - Blood, Arm, Right [[id number]]  (Normal) Collected: 05/02/24 2130    Lab Status: Final result Specimen: Blood from Arm, Right Updated: 05/07/24 2146     Blood Culture No growth at 5 days    Blood Culture ID, PCR - Blood, Hand, Left [[id number]]  (Abnormal) Collected:  05/02/24 2130    Lab Status: Final result Specimen: Blood from Hand, Left Updated: 05/03/24 1229     BCID, PCR Enterobacter cloacae complex. Identification by BCID2 PCR.     BOTTLE TYPE Pediatric Bottle    Narrative:      No resistance genes detected.    Urine Culture - Urine, Straight Cath [[id number]]  (Abnormal)  (Susceptibility) Collected: 05/02/24 1956    Lab Status: Final result Specimen: Urine from Straight Cath Updated: 05/06/24 1257     Urine Culture >100,000 CFU/mL Enterobacter cloacae complex CRE     Comment:   No carbapenemase detected.  Consider infectious disease consult.  Susceptibility results may not correlate to clinical outcomes.  Carbapenem resistant Enterobacteriaceae, patient may be an isolation risk.       Narrative:      Colonization of the urinary tract without infection is common. Treatment is discouraged unless the patient is symptomatic, pregnant, or undergoing an invasive urologic procedure.    Susceptibility        Enterobacter cloacae complex CRE      LICHA      Amoxicillin + Clavulanate Resistant      Cefazolin Resistant      Cefepime Susceptible      Ceftazidime Resistant      Ceftriaxone Resistant      Gentamicin Susceptible      Levofloxacin Resistant      Nitrofurantoin Resistant      Piperacillin + Tazobactam Resistant      Trimethoprim + Sulfamethoxazole Resistant                           COVID-19, FLU A/B, RSV PCR 1 HR TAT - Swab, Nasopharynx [[id number]]  (Normal) Collected: 05/02/24 1826    Lab Status: Final result Specimen: Swab from Nasopharynx Updated: 05/02/24 1910     COVID19 Not Detected     Influenza A PCR Not Detected     Influenza B PCR Not Detected     RSV, PCR Not Detected    Narrative:      Fact sheet for providers: https://www.fda.gov/media/872751/download    Fact sheet for patients: https://www.fda.gov/media/694367/download    Test performed by PCR.                 Radiology:   Imaging Results (Last 72 Hours)       ** No results found for the last 72 hours. **                 Active Hospital Problems    Diagnosis     **Bacteremia due to Enterobacter species     Generalized weakness     History of urinary retention     Stage 3b chronic kidney disease     UTI (urinary tract infection)     Anemia of chronic disease     Coronary artery disease involving native coronary artery of native heart without angina pectoris     Sick sinus syndrome     Rheumatoid arthritis involving multiple sites     Chronic anticoagulation     Iron deficiency anemia     Chronic embolism and thrombosis of unspecified deep veins of left lower extremity        IMPRESSION:  Enterobacter cloacae bacteremia-CRE-secondary to urinary tract infection.  Blood culture obtained May 3 negative at 5 days  Enterobacter cloacae in urine-CRE reported May 5.  Recent admission with E. coli bacteremia April 19.  This was attributed to urinary tract infection (urine culture reported as mixed brett) patient reported having urinary symptoms when she was home such as feeling like she had to urinate and could not void and also that she was not emptying completely.  She has had bladder scans here which reveal no to minimal urine.  She has a pure wick in place  History of prolonged MSSA bacteremia      RECOMMENDATION:   Continue cefepime through dosing schedule today which will complete a 7-day course  Monitor patient's symptoms as well as ability to completely void prior to discharge  Continue to increase activity.    Maggie Bang MD  05/09/24  08:06 CDT

## 2024-05-09 NOTE — TELEPHONE ENCOUNTER
Hub staff attempted to follow warm transfer process and was unsuccessful     Caller: [facility]    Relationship to patient:     Best call back number: [phone]    Patient is needing: A HOSPTIAL FOLLOW UP APPOINTMENT; DISCHARGING FROM [facility] 05-. DR. CANNON DOES NOT HAVE THE AVAILABILITY IN THE 7 DAYS WINDOW I CAN SCHEDULE FOR.

## 2024-05-09 NOTE — PLAN OF CARE
Goal Outcome Evaluation:  Plan of Care Reviewed With: patient        Progress: improving  Outcome Evaluation: A+Ox4, RAMIREZ- Up with asst to BR today. Up to chair. Waffle cushion in place. Tolerating meals and pills without issues. IVF antibiotics in place. Caregiver at bedside. Safety maintained.

## 2024-05-09 NOTE — THERAPY TREATMENT NOTE
Patient Name: Tawny Shin  : 1955    MRN: [id number]                              Today's Date: 2024       Admit Date: 2024    Visit Dx:     ICD-10-CM ICD-9-CM   1. Weakness  R53.1 780.79   2. Urinary tract infection without hematuria, site unspecified  N39.0 599.0   3. Dysphagia, unspecified type [R13.10]  R13.10 787.20   4. Generalized weakness [R53.1]  R53.1 780.79     Patient Active Problem List   Diagnosis    T12 compression fracture, initial encounter    Chronic embolism and thrombosis of unspecified deep veins of left lower extremity    Chronic anticoagulation    Iron deficiency anemia    Osteoporosis    E coli bacteremia    Epidural hematoma    Pleural effusion, left    Functional neurological symptom disorder with weakness or paralysis    Overweight (BMI 25.0-29.9)    Rheumatoid arthritis involving multiple sites    (HFpEF) heart failure with preserved ejection fraction    Venous insufficiency    Coronary artery disease involving native coronary artery of native heart without angina pectoris    Sick sinus syndrome    Essential hypertension    Pressure injury of skin of heel    Chronic pain    Anemia of chronic disease    Cholelithiasis    Pressure injury of skin of buttock    Near functional paraplegia    Skin cancer    Squamous cell carcinoma of back    Hematoma    Right-sided chest wall pain    Hyperlipidemia LDL goal <70    Malodorous urine    UTI (urinary tract infection)    Stage 3b chronic kidney disease    Cyst of buttocks    Sepsis due to Escherichia coli without acute organ dysfunction    Generalized weakness    Paralysis    History of urinary retention    Bacteremia due to Enterobacter species    Bacteremia     Past Medical History:   Diagnosis Date    Age-related osteoporosis with current pathological fracture 2020    Arthritis     Asthma     Bilateral bunions 2020    Cancer     Cardiac pacemaker syndrome 2020    Overview:  - heart block - implanted      "Charcot's joint of foot, left 12/23/2020    Chronic deep vein thrombosis (DVT) of right lower extremity 06/23/2021    Chronic pain syndrome 06/22/2021    Chronic sinusitis     COPD (chronic obstructive pulmonary disease)     Coronary artery disease     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    Elevated cholesterol     Eustachian tube dysfunction     Heart disease     Herpes simplex     History of transfusion     Hyperlipidemia     Hypertension     Hypothyroidism 12/23/2020    Intrinsic asthma 12/23/2020    Knee dislocation     Labral tear of right hip joint     Laryngitis sicca     Laryngitis, chronic     Left carotid bruit 03/09/2016    MI (myocardial infarction)     Myalgia due to statin 06/25/2019    Open wound of right hip 09/14/2021    Osteomyelitis of right femur 07/06/2021    Otorrhea     Pacemaker 11/17/2016    Primary osteoarthritis of left knee 12/23/2020    Psoriasis vulgaris 12/23/2020    S/P coronary artery stent placement 03/09/2016    Sensorineural hearing loss     Seropositive rheumatoid arthritis of multiple sites 12/27/2019    Overview:  -myochrysine '93-'96 -methotrexate '96--->11/98;r/s  restarted 2/99--> 8/14 (anemia) -sulfasalazine- not effective -penicillamine 6/98-->10/98; no effect -leflunomide 11/98--> - Humira '13-->didn't take - Enbrel 12/14-->3/15- no effect!   Last Assessment & Plan:  - \"aching all over\" because she had to be off her anti-rheumatic drugs for 2 weeks in preparation for her R knee surgery - he    Sick sinus syndrome 12/27/2019    Sjogren's disease     Spondylolisthesis of lumbar region 01/17/2018    Syncope, recurrent 02/08/2021    Urinary tract infection      Past Surgical History:   Procedure Laterality Date    A-V CARDIAC PACEMAKER INSERTION  2016    ATRIAL CARDIAC PACEMAKER INSERTION      CARDIAC CATHETERIZATION      CATARACT EXTRACTION      CERVICAL CORPECTOMY N/A 3/3/2021    Procedure: CERVICAL 6 CORPECTOMY WITH TITANIUM CAGE WITH NEURO MONITORING;  Surgeon: " Bandar Shea MD;  Location:  [facility];  Service: Neurosurgery;  Laterality: N/A;    COLONOSCOPY  11/08/2011    One fold in the ascending colon which showed ulcer otherwise normal exam    COLONOSCOPY  11/12/2004    Normal exam repeat in five years    CORONARY ANGIOPLASTY WITH STENT PLACEMENT      X 2; 2013 & 2014    ENDOSCOPY  07/10/2014    Normal exam    FLAP LEG Right 9/14/2021    Procedure: RIGHT GLUTEAL FASCIOCUTANEOUS ADVANCEMENT FLAP AND RIGHT TENSOR FASCIAL JESSICA FLAP;  Surgeon: Amadeo Turner MD;  Location:  [facility];  Service: Plastics;  Laterality: Right;    HIP ABDUCTION TENOTOMY BILATERAL Right 1/14/2021    Procedure: RIGHT HIP GLUTEUS MEDLUS / MINIMUS REPAIR, POSSIBLE ACHILLES ALLOGRAFT;  Surgeon: Nino Carlson MD;  Location:  [facility];  Service: Orthopedics;  Laterality: Right;    INCISION AND DRAINAGE ABSCESS Right 6/4/2022    Procedure: INCISION AND DRAINAGE ABSCESS right hip;  Surgeon: Magda Salcido MD;  Location:  [facility];  Service: General;  Laterality: Right;    INCISION AND DRAINAGE ABSCESS Right 6/10/2022    Procedure: RIGHT HIP INCISION AND DRAINAGE. MD NEEDS 3L VANC IRRIGATION, CURRETTES, DAICANS, KERLEX ROLLS;  Surgeon: Amadeo Turner MD;  Location:  [facility];  Service: Plastics;  Laterality: Right;    INCISION AND DRAINAGE HIP Right 2/9/2021    Procedure: HIP INCISION AND DRAINAGE;  Surgeon: Nino Carlson MD;  Location:  [facility];  Service: Orthopedics;  Laterality: Right;    INCISION AND DRAINAGE LEG Right 10/24/2021    Procedure: INCISION AND DRAINAGE LOWER EXTREMITY;  Surgeon: Amadeo Turner MD;  Location:  [facility];  Service: Plastics;  Laterality: Right;    INCISION AND DRAINAGE OF WOUND Right 7/8/2021    Procedure: INCISION AND DRAINAGE WOUND RIGHT HIP;  Surgeon: James Huntley MD;  Location:  [facility];  Service: Orthopedics;  Laterality: Right;    JOINT REPLACEMENT      KYPHOPLASTY WITH BIOPSY Bilateral 10/26/2021    Procedure: THOARCIC 12 KYPHOPLASTY  WITH BIOPSY;  Surgeon: Bandar Shea MD;  Location:  [facility];  Service: Neurosurgery;  Laterality: Bilateral;    LEG DEBRIDEMENT Right 9/14/2021    Procedure: DEBRIDEMENT OF RIGHT HIP WOUND, RIGHT GLUTEAL FASCIOCUTANEOUS ADVANCEMENT FLAP AND RIGHT TENSOR FASCIAL JESSICA FLAP;  Surgeon: Amadeo Turner MD;  Location:  [facility];  Service: Plastics;  Laterality: Right;    LUMBAR DISCECTOMY Right 3/23/2021    Procedure: LUMBAR DISCECTOMY MICRO, Lumbar 1/2 right;  Surgeon: Bandar Shea MD;  Location:  [facility];  Service: Neurosurgery;  Laterality: Right;    LUMBAR FUSION N/A 1/19/2018    Procedure: L3-4,L4-5 DECOMPRESSION, POSTERIOR SPINAL FUSION WITH INSTRUMENTATION;  Surgeon: Fortino Oropeza MD;  Location:  [facility];  Service:     LUMBAR LAMINECTOMY WITH FUSION Left 1/17/2018    Procedure: LEFT L3-4 L4-5 LATERAL LUMBAR INTERBODY FUSION;  Surgeon: Fortino Oropeza MD;  Location:  [facility];  Service:     MASS EXCISION Right 4/23/2024    Procedure: RIGHT BUTTOCK MASS EXCISION;  Surgeon: Moises Keyes MD;  Location:  [facility];  Service: General;  Laterality: Right;    MYRINGOTOMY W/ TUBES  09/04/2014    TUBES NO LONGER IN PLACE    OTHER SURGICAL HISTORY      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 so hardware was removed and spacers were placed    REPLACEMENT TOTAL KNEE Right       General Information       Row Name 05/09/24 0923          OT Time and Intention    Document Type therapy note (daily note)  -LS     Mode of Treatment occupational therapy  -       Row Name 05/09/24 0923          General Information    Patient Profile Reviewed yes  -LS     Existing Precautions/Restrictions fall  -       Row Name 05/09/24 0923          Cognition    Orientation Status (Cognition) oriented x 4  -       Row Name 05/09/24 0923          Safety Issues, Functional Mobility    Impairments Affecting Function (Mobility) balance;endurance/activity tolerance;sensation/sensory awareness;strength  -               User  Key  (r) = Recorded By, (t) = Taken By, (c) = Cosigned By      Initials Name Provider Type    LS Bianca Mcgarry OTR/L Occupational Therapist                     Mobility/ADL's       Row Name 05/09/24 0923          Transfers    Transfers sit-stand transfer;stand-sit transfer;toilet transfer  -       Row Name 05/09/24 0923          Sit-Stand Transfer    Sit-Stand [place] (Transfers) minimum assist (75% patient effort);verbal cues  -LS     Assistive Device (Sit-Stand Transfers) walker, front-wheeled  -LS       Row Name 05/09/24 0923          Stand-Sit Transfer    Stand-Sit [place] (Transfers) minimum assist (75% patient effort);verbal cues  -LS     Assistive Device (Stand-Sit Transfers) walker, front-wheeled  -LS       Row Name 05/09/24 0923          Toilet Transfer    Type (Toilet Transfer) sit-stand;stand-sit  -LS     [place] Level (Toilet Transfer) minimum assist (75% patient effort);verbal cues  -LS     Assistive Device (Toilet Transfer) grab bars/safety frame;walker, front-wheeled  -LS       Row Name 05/09/24 0923          Functional Mobility    Functional Mobility- Ind. Level contact guard assist  -LS     Functional Mobility- Device walker, front-wheeled  -LS     Patient was able to Ambulate yes  -       Row Name 05/09/24 0923          Activities of Daily Living    BADL Assessment/Intervention toileting  -       Row Name 05/09/24 0923          Toileting Assessment/Training    [place] Level (Toileting) toileting skills;adjust/manage clothing;maximum assist (25% patient effort)  -     Position (Toileting) unsupported sitting;supported standing  -               User Key  (r) = Recorded By, (t) = Taken By, (c) = Cosigned By      Initials Name Provider Type    Bianca Saleh, OTR/L Occupational Therapist                   Obj/Interventions    No documentation.                  Goals/Plan    No documentation.                  Clinical Impression       Row Name 05/09/24 0923          Pain  Assessment    Pretreatment Pain Rating 0/10 - no pain  -LS     Posttreatment Pain Rating 0/10 - no pain  -LS       Row Name 05/09/24 0923          Plan of Care Review    Plan of Care Reviewed With patient  -LS     Progress improving  -LS     Outcome Evaluation OT tx completed. Pt seated in recliner upon therapist arrival; A&Ox4; No c/o pain; Caregiver also present. Pt performed all sit<>stand transfers to/from chair, bed, and toilet with Min A and verbal cues for sequencing and positioning of AD. Pt ambulated from chair>BR requiring seated rest break on bed on the way to the BR due to LE fatigue. Pt required Max A for clothing management during toilet task. Pt continues to benefit from skilled OT intervention in order to address remaining deficits in fxl mobility, fxl activity tolerance, balance, strength, and use of adaptive techniques/equipment during performance of BADLs. Recommend home with 24/7 assist and HH at discharge.  -       Row Name 05/09/24 0923          Therapy Plan Review/Discharge Plan (OT)    Anticipated Discharge Disposition (OT) home with 24/7 care;home with home health  -       Row Name 05/09/24 0923          Positioning and Restraints    In Chair reclined;call light within reach;encouraged to call for assist;with family/caregiver;legs elevated  -               User Key  (r) = Recorded By, (t) = Taken By, (c) = Cosigned By      Initials Name Provider Type    Bianca Saleh, OTR/L Occupational Therapist                   Outcome Measures       Row Name 05/09/24 0923          How much help from another is currently needed...    Putting on and taking off regular lower body clothing? 2  -LS     Bathing (including washing, rinsing, and drying) 3  -LS     Toileting (which includes using toilet bed pan or urinal) 2  -LS     Putting on and taking off regular upper body clothing 3  -LS     Taking care of personal grooming (such as brushing teeth) 3  -LS     Eating meals 4  -LS     AM-PAC 6 Clicks  Score (OT) 17  -LS       Row Name 05/09/24 0923          Functional Assessment    Outcome Measure Options AM-PAC 6 Clicks Daily Activity (OT)  -LS               User Key  (r) = Recorded By, (t) = Taken By, (c) = Cosigned By      Initials Name Provider Type    Bianca Saleh OTR/L Occupational Therapist                    Occupational Therapy Education       Title: PT OT SLP Therapies (In Progress)       Topic: Occupational Therapy (Done)       Point: ADL training (Done)       Description:   Instruct learner(s) on proper safety adaptation and remediation techniques during self care or transfers.   Instruct in proper use of assistive devices.                  Learning Progress Summary             Patient Acceptance, E, VU by LS at 5/9/2024 0959    Acceptance, E, VU by EC at 5/3/2024 1245    Comment: role of OT, OT POC, body mechanics w bed mobility   Family Acceptance, E, VU by EC at 5/3/2024 1245    Comment: role of OT, OT POC, body mechanics w bed mobility                         Point: Home exercise program (Done)       Description:   Instruct learner(s) on appropriate technique for monitoring, assisting and/or progressing therapeutic exercises/activities.                  Learning Progress Summary             Patient Acceptance, E, VU by EC at 5/6/2024 1527                         Point: Precautions (Done)       Description:   Instruct learner(s) on prescribed precautions during self-care and functional transfers.                  Learning Progress Summary             Patient Acceptance, E, VU by LS at 5/9/2024 0959    Acceptance, E, VU by EC at 5/6/2024 1527    Acceptance, E, VU by EC at 5/3/2024 1245    Comment: role of OT, OT POC, body mechanics w bed mobility   Family Acceptance, E, VU by EC at 5/3/2024 1245    Comment: role of OT, OT POC, body mechanics w bed mobility                         Point: Body mechanics (Done)       Description:   Instruct learner(s) on proper positioning and spine alignment  during self-care, functional mobility activities and/or exercises.                  Learning Progress Summary             Patient Acceptance, E, VU by  at 5/9/2024 0959    Acceptance, E, VU by EC at 5/6/2024 1527    Acceptance, E, VU by EC at 5/3/2024 1245    Comment: role of OT, OT POC, body mechanics w bed mobility   Family Acceptance, E, VU by EC at 5/3/2024 1245    Comment: role of OT, OT POC, body mechanics w bed mobility                                         User Key       Initials Effective Dates Name Provider Type Discipline     06/20/22 -  Bianca Mcgarry OTR/L Occupational Therapist OT    EC 10/13/23 -  Ashley Martino OTR/L Occupational Therapist OT                  OT Recommendation and Plan     Plan of Care Review  Plan of Care Reviewed With: patient  Progress: improving  Outcome Evaluation: OT tx completed. Pt seated in recliner upon therapist arrival; A&Ox4; No c/o pain; Caregiver also present. Pt performed all sit<>stand transfers to/from chair, bed, and toilet with Min A and verbal cues for sequencing and positioning of AD. Pt ambulated from chair>BR requiring seated rest break on bed on the way to the BR due to LE fatigue. Pt required Max A for clothing management during toilet task. Pt continues to benefit from skilled OT intervention in order to address remaining deficits in fxl mobility, fxl activity tolerance, balance, strength, and use of adaptive techniques/equipment during performance of BADLs. Recommend home with 24/7 assist and HH at discharge.     Time Calculation:         Time Calculation- OT       Row Name 05/09/24 0923             Time Calculation- OT    OT Start Time 0923  -      OT Stop Time 0948  -      OT Time Calculation (min) 25 min  -      Total Timed Code Minutes- OT 25 minute(s)  -      OT Received On 05/09/24  -                User Key  (r) = Recorded By, (t) = Taken By, (c) = Cosigned By      Initials Name Provider Type     Bianca Mcgarry OTR/KARUNA Occupational  Therapist                  Therapy Charges for Today       Code Description Service Date Service Provider Modifiers Qty    [id number] HC OT SELF CARE/MGMT/TRAIN EA 15 MIN 5/9/2024 Bianca Mcgarry OTR/L GO 2                 Bianca Mcgarry OTR/KARUNA  5/9/2024

## 2024-05-09 NOTE — THERAPY TREATMENT NOTE
Acute Care - Physical Therapy Treatment Note  [facility]     Patient Name: Tawny Shin  : 1955  MRN: [id number]  Today's Date: 2024      Visit Dx:     ICD-10-CM ICD-9-CM   1. Weakness  R53.1 780.79   2. Urinary tract infection without hematuria, site unspecified  N39.0 599.0   3. Dysphagia, unspecified type [R13.10]  R13.10 787.20   4. Generalized weakness [R53.1]  R53.1 780.79     Patient Active Problem List   Diagnosis    T12 compression fracture, initial encounter    Chronic embolism and thrombosis of unspecified deep veins of left lower extremity    Chronic anticoagulation    Iron deficiency anemia    Osteoporosis    E coli bacteremia    Epidural hematoma    Pleural effusion, left    Functional neurological symptom disorder with weakness or paralysis    Overweight (BMI 25.0-29.9)    Rheumatoid arthritis involving multiple sites    (HFpEF) heart failure with preserved ejection fraction    Venous insufficiency    Coronary artery disease involving native coronary artery of native heart without angina pectoris    Sick sinus syndrome    Essential hypertension    Pressure injury of skin of heel    Chronic pain    Anemia of chronic disease    Cholelithiasis    Pressure injury of skin of buttock    Near functional paraplegia    Skin cancer    Squamous cell carcinoma of back    Hematoma    Right-sided chest wall pain    Hyperlipidemia LDL goal <70    Malodorous urine    UTI (urinary tract infection)    Stage 3b chronic kidney disease    Cyst of buttocks    Sepsis due to Escherichia coli without acute organ dysfunction    Generalized weakness    Paralysis    History of urinary retention    Bacteremia due to Enterobacter species    Bacteremia     Past Medical History:   Diagnosis Date    Age-related osteoporosis with current pathological fracture 2020    Arthritis     Asthma     Bilateral bunions 2020    Cancer     Cardiac pacemaker syndrome 2020    Overview:  - heart block - implanted  "11/16    Charcot's joint of foot, left 12/23/2020    Chronic deep vein thrombosis (DVT) of right lower extremity 06/23/2021    Chronic pain syndrome 06/22/2021    Chronic sinusitis     COPD (chronic obstructive pulmonary disease)     Coronary artery disease     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    Elevated cholesterol     Eustachian tube dysfunction     Heart disease     Herpes simplex     History of transfusion     Hyperlipidemia     Hypertension     Hypothyroidism 12/23/2020    Intrinsic asthma 12/23/2020    Knee dislocation     Labral tear of right hip joint     Laryngitis sicca     Laryngitis, chronic     Left carotid bruit 03/09/2016    MI (myocardial infarction)     Myalgia due to statin 06/25/2019    Open wound of right hip 09/14/2021    Osteomyelitis of right femur 07/06/2021    Otorrhea     Pacemaker 11/17/2016    Primary osteoarthritis of left knee 12/23/2020    Psoriasis vulgaris 12/23/2020    S/P coronary artery stent placement 03/09/2016    Sensorineural hearing loss     Seropositive rheumatoid arthritis of multiple sites 12/27/2019    Overview:  -myochrysine '93-'96 -methotrexate '96--->11/98;r/s  restarted 2/99--> 8/14 (anemia) -sulfasalazine- not effective -penicillamine 6/98-->10/98; no effect -leflunomide 11/98--> - Humira '13-->didn't take - Enbrel 12/14-->3/15- no effect!   Last Assessment & Plan:  - \"aching all over\" because she had to be off her anti-rheumatic drugs for 2 weeks in preparation for her R knee surgery - he    Sick sinus syndrome 12/27/2019    Sjogren's disease     Spondylolisthesis of lumbar region 01/17/2018    Syncope, recurrent 02/08/2021    Urinary tract infection      Past Surgical History:   Procedure Laterality Date    A-V CARDIAC PACEMAKER INSERTION  2016    ATRIAL CARDIAC PACEMAKER INSERTION      CARDIAC CATHETERIZATION      CATARACT EXTRACTION      CERVICAL CORPECTOMY N/A 3/3/2021    Procedure: CERVICAL 6 CORPECTOMY WITH TITANIUM CAGE WITH NEURO MONITORING;  " Surgeon: Bandar hSea MD;  Location:  [facility];  Service: Neurosurgery;  Laterality: N/A;    COLONOSCOPY  11/08/2011    One fold in the ascending colon which showed ulcer otherwise normal exam    COLONOSCOPY  11/12/2004    Normal exam repeat in five years    CORONARY ANGIOPLASTY WITH STENT PLACEMENT      X 2; 2013 & 2014    ENDOSCOPY  07/10/2014    Normal exam    FLAP LEG Right 9/14/2021    Procedure: RIGHT GLUTEAL FASCIOCUTANEOUS ADVANCEMENT FLAP AND RIGHT TENSOR FASCIAL JESSICA FLAP;  Surgeon: Amadeo Turner MD;  Location:  [facility];  Service: Plastics;  Laterality: Right;    HIP ABDUCTION TENOTOMY BILATERAL Right 1/14/2021    Procedure: RIGHT HIP GLUTEUS MEDLUS / MINIMUS REPAIR, POSSIBLE ACHILLES ALLOGRAFT;  Surgeon: Nino Carlson MD;  Location:  [facility];  Service: Orthopedics;  Laterality: Right;    INCISION AND DRAINAGE ABSCESS Right 6/4/2022    Procedure: INCISION AND DRAINAGE ABSCESS right hip;  Surgeon: Magda Salcido MD;  Location:  [facility];  Service: General;  Laterality: Right;    INCISION AND DRAINAGE ABSCESS Right 6/10/2022    Procedure: RIGHT HIP INCISION AND DRAINAGE. MD NEEDS 3L VANC IRRIGATION, CURRETTES, DAICANS, KERLEX ROLLS;  Surgeon: Amadeo Turner MD;  Location:  [facility];  Service: Plastics;  Laterality: Right;    INCISION AND DRAINAGE HIP Right 2/9/2021    Procedure: HIP INCISION AND DRAINAGE;  Surgeon: Nino Carlson MD;  Location:  [facility];  Service: Orthopedics;  Laterality: Right;    INCISION AND DRAINAGE LEG Right 10/24/2021    Procedure: INCISION AND DRAINAGE LOWER EXTREMITY;  Surgeon: Amadeo Turner MD;  Location:  [facility];  Service: Plastics;  Laterality: Right;    INCISION AND DRAINAGE OF WOUND Right 7/8/2021    Procedure: INCISION AND DRAINAGE WOUND RIGHT HIP;  Surgeon: James Huntley MD;  Location:  [facility];  Service: Orthopedics;  Laterality: Right;    JOINT REPLACEMENT      KYPHOPLASTY WITH BIOPSY Bilateral 10/26/2021    Procedure: THOARCIC 12  KYPHOPLASTY WITH BIOPSY;  Surgeon: Bandar Shea MD;  Location:  [facility];  Service: Neurosurgery;  Laterality: Bilateral;    LEG DEBRIDEMENT Right 9/14/2021    Procedure: DEBRIDEMENT OF RIGHT HIP WOUND, RIGHT GLUTEAL FASCIOCUTANEOUS ADVANCEMENT FLAP AND RIGHT TENSOR FASCIAL JESSICA FLAP;  Surgeon: Amadeo Turner MD;  Location:  [facility];  Service: Plastics;  Laterality: Right;    LUMBAR DISCECTOMY Right 3/23/2021    Procedure: LUMBAR DISCECTOMY MICRO, Lumbar 1/2 right;  Surgeon: Bandar Shea MD;  Location:  [facility];  Service: Neurosurgery;  Laterality: Right;    LUMBAR FUSION N/A 1/19/2018    Procedure: L3-4,L4-5 DECOMPRESSION, POSTERIOR SPINAL FUSION WITH INSTRUMENTATION;  Surgeon: Fortino Oropeza MD;  Location:  [facility];  Service:     LUMBAR LAMINECTOMY WITH FUSION Left 1/17/2018    Procedure: LEFT L3-4 L4-5 LATERAL LUMBAR INTERBODY FUSION;  Surgeon: Fortino Oropeza MD;  Location:  [facility];  Service:     MASS EXCISION Right 4/23/2024    Procedure: RIGHT BUTTOCK MASS EXCISION;  Surgeon: Moises Keyes MD;  Location:  [facility];  Service: General;  Laterality: Right;    MYRINGOTOMY W/ TUBES  09/04/2014    TUBES NO LONGER IN PLACE    OTHER SURGICAL HISTORY      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 so hardware was removed and spacers were placed    REPLACEMENT TOTAL KNEE Right      PT Assessment (Last 12 Hours)       PT Evaluation and Treatment       Row Name 05/09/24 1050          Physical Therapy Time and Intention    Subjective Information complains of;weakness;fatigue  -KINGS     Document Type therapy note (daily note)  -KINGS     Mode of Treatment physical therapy  -KINGS     Comment noticed that pt had more word finding difficulty today  -KINGS       Row Name 05/09/24 1051          General Information    Patient/Family/Caregiver Comments/Observations 2 sitters present  -KINGS     Existing Precautions/Restrictions fall  -KINGS       Row Name 05/09/24 1058          Pain    Pretreatment Pain Rating 0/10 - no  pain  -KINGS     Posttreatment Pain Rating 0/10 - no pain  -KINGS       Row Name 05/09/24 1059          Bed Mobility    Supine-Sit [place] (Bed Mobility) verbal cues;contact guard  -KINGS     Sit-Supine [place] (Bed Mobility) verbal cues;contact guard  -KINGS     Assistive Device (Bed Mobility) bed rails;head of bed elevated  -KINGS       Row Name 05/09/24 1059          Transfers    Comment, (Transfers) stood x 5, focused on maintaining standing.  At most pt able to hold for 40 seconds  -KINGS       Row Name 05/09/24 1059          Sit-Stand Transfer    Sit-Stand [place] (Transfers) verbal cues;contact guard;minimum assist (75% patient effort)  -     Assistive Device (Sit-Stand Transfers) walker, front-wheeled  -KINGS       Row Name 05/09/24 1059          Stand-Sit Transfer    Stand-Sit [place] (Transfers) verbal cues;contact guard  -     Assistive Device (Stand-Sit Transfers) walker, front-wheeled  -KINGS       Row Name 05/09/24 1059          Motor Skills    Comments, Therapeutic Exercise in fowlers and sitting, AROM BLE X 20  -KINGS       Row Name             Wound 04/23/24 0936 Right gluteal Incision    Wound - Properties Group Placement Date: 04/23/24  -EP Placement Time: 0936  -EP Present on Original Admission: N  -EP Side: Right  -EP Location: gluteal  -EP Primary Wound Type: Incision  -EP    Retired Wound - Properties Group Placement Date: 04/23/24  -EP Placement Time: 0936  -EP Present on Original Admission: N  -EP Side: Right  -EP Location: gluteal  -EP Primary Wound Type: Incision  -EP    Retired Wound - Properties Group Date first assessed: 04/23/24  -EP Time first assessed: 0936  -EP Present on Original Admission: N  -EP Side: Right  -EP Location: gluteal  -EP Primary Wound Type: Incision  -EP      Row Name 05/09/24 1059          Positioning and Restraints    Pre-Treatment Position in bed  -KINGS     Post Treatment Position bed  -KINGS     In Bed fowlers;call light within reach;encouraged to call for assist;with  family/caregiver;side rails up x2  -KINGS               User Key  (r) = Recorded By, (t) = Taken By, (c) = Cosigned By      Initials Name Provider Type    KINGS Garcia Francis, SERENA Physical Therapist Assistant    Sonny Thompson, RN Registered Nurse                    Physical Therapy Education       Title: PT OT SLP Therapies (In Progress)       Topic: Physical Therapy (In Progress)       Point: Mobility training (Done)       Learning Progress Summary             Patient Acceptance, E, VU by MS at 5/5/2024 0910    Comment: role of PT in her care    Acceptance, E, VU by  at 5/2/2024 2240   Caregiver Acceptance, E, VU by  at 5/2/2024 2240                         Point: Home exercise program (Done)       Learning Progress Summary             Patient Acceptance, E, VU by  at 5/2/2024 2240   Caregiver Acceptance, E, VU by  at 5/2/2024 2240                         Point: Body mechanics (Not Started)       Learner Progress:  Not documented in this visit.              Point: Precautions (Done)       Learning Progress Summary             Patient Acceptance, E, VU by  at 5/2/2024 2240   Caregiver Acceptance, E, VU by  at 5/2/2024 2240                                         User Key       Initials Effective Dates Name Provider Type Discipline    MS 07/11/23 -  Nicole Olson, PT, DPT, NCS Physical Therapist PT     04/25/23 -  Rick Townsend, RN Registered Nurse Nurse                  PT Recommendation and Plan     Plan of Care Reviewed With: patient  Progress: improving  Outcome Evaluation: Pt was in bed, eager to work with therapy, caregivers present.  Performed bed mobility with CGA with HOB elevated.  Transfered sit to stand with CGA/min assist.  Practiced sit to stand x 5, focused on maintaining standing.  Pt able to hold stand with CGA with RWX at most for  40 seconds.  Pt able to take a few side steps with RWX and min assist.  Performed LE exercises in sitting and in fowlers.  Noticed that pt was having more  word finding difficulty today and some increase confusion.   Outcome Measures       Row Name 05/09/24 1059 05/08/24 1500 05/08/24 0920       How much help from another person do you currently need...    Turning from your back to your side while in flat bed without using bedrails? 3  -KINGS -- 3  -KINGS    Moving from lying on back to sitting on the side of a flat bed without bedrails? 3  -KINGS -- 3  -KINGS    Moving to and from a bed to a chair (including a wheelchair)? 3  -KINGS -- 2  -KINGS    Standing up from a chair using your arms (e.g., wheelchair, bedside chair)? 3  -KINGS -- 2  -KINGS    Climbing 3-5 steps with a railing? 1  -KINGS -- 1  -KINGS    To walk in hospital room? 2  -KINGS -- 1  -KINGS    AM-PAC 6 Clicks Score (PT) 15  -KINGS -- 12  -KINGS    Highest Level of Mobility Goal 4 --> Transfer to chair/commode  -KINGS -- 4 --> Transfer to chair/commode  -KINGS       How much help from another is currently needed...    Putting on and taking off regular lower body clothing? -- 2  -TS --    Bathing (including washing, rinsing, and drying) -- 3  -TS --    Toileting (which includes using toilet bed pan or urinal) -- 3  -TS --    Putting on and taking off regular upper body clothing -- 3  -TS --    Taking care of personal grooming (such as brushing teeth) -- 3  -TS --    Eating meals -- 4  -TS --    AM-PAC 6 Clicks Score (OT) -- 18  -TS --       Functional Assessment    Outcome Measure Options AM-PAC 6 Clicks Basic Mobility (PT)  -KINGS -- AM-PAC 6 Clicks Basic Mobility (PT)  -KINGS      Row Name 05/07/24 1400 05/07/24 0924 05/06/24 1500       How much help from another person do you currently need...    Turning from your back to your side while in flat bed without using bedrails? -- 3  -KINGS --    Moving from lying on back to sitting on the side of a flat bed without bedrails? -- 3  -KINGS --    Moving to and from a bed to a chair (including a wheelchair)? -- 2  -KINGS --    Standing up from a chair using your arms (e.g., wheelchair, bedside chair)? -- 2  -KINGS --     Climbing 3-5 steps with a railing? -- 1  -KINGS --    To walk in hospital room? -- 1  -KINGS --    AM-PAC 6 Clicks Score (PT) -- 12  -KINGS --    Highest Level of Mobility Goal -- 4 --> Transfer to chair/commode  -KINGS --       How much help from another is currently needed...    Putting on and taking off regular lower body clothing? 2  -TS -- 2  -EC    Bathing (including washing, rinsing, and drying) 3  -TS -- 2  -EC    Toileting (which includes using toilet bed pan or urinal) 3  -TS -- 2  -EC    Putting on and taking off regular upper body clothing 3  -TS -- 3  -EC    Taking care of personal grooming (such as brushing teeth) 3  -TS -- 3  -EC    Eating meals 4  -TS -- 4  -EC    AM-PAC 6 Clicks Score (OT) 18  -TS -- 16  -EC       Functional Assessment    Outcome Measure Options -- AM-PAC 6 Clicks Basic Mobility (PT)  -KINGS AM-PAC 6 Clicks Daily Activity (OT)  -EC              User Key  (r) = Recorded By, (t) = Taken By, (c) = Cosigned By      Initials Name Provider Type    TS Carolee Giordano COTA Occupational Therapist Assistant    Garcia Jaramillo PTA Physical Therapist Assistant    Ashley Nash OTR/L Occupational Therapist                     Time Calculation:    PT Charges       Row Name 05/09/24 1059             Time Calculation    Start Time 1059  -KINGS      Stop Time 1142  -KINGS      Time Calculation (min) 43 min  -KINGS      PT Received On 05/09/24  -KINGS         Time Calculation- PT    Total Timed Code Minutes- PT 43 minute(s)  -KINGS         Timed Charges    87924 - PT Therapeutic Exercise Minutes 27  -KINGS      77715 - PT Therapeutic Activity Minutes 16  -KINGS         Total Minutes    Timed Charges Total Minutes 43  -KINGS       Total Minutes 43  -KINGS                User Key  (r) = Recorded By, (t) = Taken By, (c) = Cosigned By      Initials Name Provider Type    Garcia Jaramillo PTA Physical Therapist Assistant                  Therapy Charges for Today       Code Description Service Date Service Provider Modifiers  Qty    45376302039 HC PT THERAPEUTIC ACT EA 15 MIN 5/8/2024 Garcia Francis L, PTA GP 1    55285874048 HC PT THERAPEUTIC ACT EA 15 MIN 5/8/2024 Garcia Francis L, PTA GP 1    48011482578 HC PT THER PROC EA 15 MIN 5/8/2024 Garcia Francis L, PTA GP 1    63705107341 HC PT THERAPEUTIC ACT EA 15 MIN 5/9/2024 Garcia Francis, PTA GP 1    79865759040 HC PT THER PROC EA 15 MIN 5/9/2024 Garcia Francis, PTA GP 2            PT G-Codes  Outcome Measure Options: AM-PAC 6 Clicks Basic Mobility (PT)  AM-PAC 6 Clicks Score (PT): 15  AM-PAC 6 Clicks Score (OT): 17    Garcia Francis PTA  5/9/2024

## 2024-05-09 NOTE — DISCHARGE SUMMARY
[facility] Medicine Services  DISCHARGE SUMMARY       Date of Admission: 5/2/2024  Date of Discharge:  5/9/2024  Primary Care Physician: Moises Oliveira MD    Presenting Problem/History of Present Illness:      Final Discharge Diagnoses:  Active Hospital Problems    Diagnosis     **Bacteremia due to Enterobacter species     Generalized weakness     History of urinary retention     Stage 3b chronic kidney disease     UTI (urinary tract infection)     Anemia of chronic disease     Coronary artery disease involving native coronary artery of native heart without angina pectoris     Sick sinus syndrome     Rheumatoid arthritis involving multiple sites     Chronic anticoagulation     Iron deficiency anemia     Chronic embolism and thrombosis of unspecified deep veins of left lower extremity        Consults: Infectious disease.    Pertinent Test Results:   Results for orders placed during the hospital encounter of 02/09/24    Adult Stress Echo W/ Cont or Stress Agent if Necessary Per Protocol    Interpretation Summary    Abnormal stress echo with echocardiographic evidence for myocardial ischemia located in the lateral wall consistent with a high risk study for myocardial ischemia.    The following left ventricular wall segments are hypokinetic: mid anterolateral, apical lateral and mid inferolateral.    Left ventricular systolic function is normal at rest.      Imaging Results (All)       Procedure Component Value Units Date/Time    XR Chest 1 View [[id number]] Collected: 05/02/24 1806     Updated: 05/02/24 1810    Narrative:      EXAM: XR CHEST 1 VW- 5/2/2024 4:55 PM     HISTORY: Weakness       COMPARISON: 4/19/2024.     TECHNIQUE: Single frontal radiograph of the chest was obtained.     FINDINGS:     Support Devices: Cardiac pacemaker device with leads overlying the right  atrium and right ventricle.     Cardiac and Mediastinal Silhouettes: Normal.     Lungs/Pleura: No focal  consolidation. No sizable pleural effusion. No  visible pneumothorax.     Osseous structures: No acute osseous finding.     Other: None.       Impression:         No acute cardiopulmonary abnormality.           This report was signed and finalized on 5/2/2024 6:07 PM by Marco Tovar.             LAB RESULTS:      Lab 05/09/24 0836 05/08/24 0444 05/07/24 0720 05/06/24 0445 05/05/24 0426 05/04/24 0518 05/03/24 0517 05/02/24  1905   WBC 5.88 5.60 5.01 4.56 4.69 5.29   < > 8.03   HEMOGLOBIN 9.4* 8.7* 8.8* 8.5* 8.7* 8.6*   < > 9.7*   HEMATOCRIT 31.6* 28.4* 28.5* 28.2* 28.0* 27.8*   < > 31.6*   PLATELETS 181 155 146 141 132* 127*   < > 168   NEUTROS ABS  --   --   --  1.96 2.25 2.85  --  4.60   IMMATURE GRANS (ABS)  --   --   --  0.06* 0.05 0.05  --  0.09*   LYMPHS ABS  --   --   --  1.78 1.51 1.35  --  2.13   MONOS ABS  --   --   --  0.55 0.66 0.87  --  1.09*   EOS ABS  --   --   --  0.16 0.17 0.13  --  0.08   MCV 98.1* 97.3* 96.9 96.6 94.0 95.5   < > 96.3   LACTATE  --   --   --   --   --   --   --  0.9   PROTIME  --   --   --   --   --   --   --  19.3*   APTT  --   --   --   --   --   --   --  68.0*   D DIMER QUANT  --   --   --   --   --   --   --  1.25*    < > = values in this interval not displayed.         Lab 05/09/24 0836 05/08/24 0444 05/07/24 0720 05/06/24 0445 05/05/24 0426 05/04/24 0518 05/03/24 0518 05/02/24  1905   SODIUM 142 142 141 140 142 139   < > 139   POTASSIUM 4.1 4.3 4.3 4.0 3.6 3.5   < > 3.5   CHLORIDE 109* 112* 110* 110* 109* 104   < > 99   CO2 23.0 23.0 24.0 22.0 25.0 26.0   < > 29.0   ANION GAP 10.0 7.0 7.0 8.0 8.0 9.0   < > 11.0   BUN 14 15 15 18 27* 32*   < > 29*   CREATININE 0.96 0.92 0.91 0.94 1.31* 1.79*   < > 1.89*   EGFR 63.8 67.1 68.0 65.4 43.9* 30.2*   < > 28.3*   GLUCOSE 86 82 82 85 90 90   < > 110*   CALCIUM 9.2 8.8 8.9 8.8 8.6 8.5*   < > 9.3   MAGNESIUM  --   --   --   --   --  1.8  --  1.8   TSH  --   --  8.870*  --   --   --   --   --     < > = values in this  interval not displayed.         Lab 05/02/24  1905   TOTAL PROTEIN 6.9   ALBUMIN 3.3*   GLOBULIN 3.6   ALT (SGPT) 24   AST (SGOT) 26   BILIRUBIN 0.4   ALK PHOS 68         Lab 05/02/24 2130 05/02/24  1905   PROBNP  --  1,900.0*   HSTROP T 50* 56*   PROTIME  --  19.3*   INR  --  1.56*         Lab 05/07/24  0720   CHOLESTEROL 207*   LDL CHOL 133*   HDL CHOL 26*   TRIGLYCERIDES 266*             Brief Urine Lab Results  (Last result in the past 365 days)        Color   Clarity   Blood   Leuk Est   Nitrite   Protein   CREAT   Urine HCG        05/09/24 0139 Yellow   Clear   Moderate (2+)   Moderate (2+)   Negative   100 mg/dL (2+)                 Microbiology Results (last 10 days)       Procedure Component Value - Date/Time    Miscellaneous Micro Request - Specimen Not Sent, Specimen Not Sent [[id number]] Resulted: 05/07/24 1239    Lab Status: Final result Specimen: Specimen Not Sent Updated: 05/07/24 1239     Extra Tube --    Narrative:      Ceftaz/avibactam release.    Blood Culture With BREONNA - Blood, Arm, Left [[id number]]  (Normal) Collected: 05/03/24 1738    Lab Status: Final result Specimen: Blood from Arm, Left Updated: 05/08/24 1815     Blood Culture No growth at 5 days    Blood Culture - Blood, Hand, Left [[id number]]  (Abnormal)  (Susceptibility) Collected: 05/02/24 2130    Lab Status: Edited Result - FINAL Specimen: Blood from Hand, Left Updated: 05/07/24 1239     Blood Culture Enterobacter cloacae complex CRE     Comment:   Carbapenem resistant Enterobacteriaceae, patient may be an isolation risk.  No carbapenemase detected.        Isolated from Pediatric Bottle     Gram Stain Pediatric Bottle Gram negative bacilli    Susceptibility        Enterobacter cloacae complex CRE      LICHA      Cefepime Susceptible      Ceftazidime + Avibactam Susceptible (C)  [1]       Gentamicin Susceptible      Imipenem Intermediate      Levofloxacin Resistant      Meropenem Resistant      Trimethoprim + Sulfamethoxazole Resistant                    [1]  Appended report. These results have been appended to a previously final verified report.                Susceptibility Comments       Enterobacter cloacae complex CRE    Cefazolin sensitivity will not be reported for Enterobacteriaceae in non-urine isolates. If cefazolin is preferred, please call the microbiology lab to request an E-test.  With the exception of urinary-sourced infections, aminoglycosides should not be used as monotherapy.               Blood Culture - Blood, Arm, Right [[id number]]  (Normal) Collected: 05/02/24 2130    Lab Status: Final result Specimen: Blood from Arm, Right Updated: 05/07/24 2146     Blood Culture No growth at 5 days    Blood Culture ID, PCR - Blood, Hand, Left [[id number]]  (Abnormal) Collected: 05/02/24 2130    Lab Status: Final result Specimen: Blood from Hand, Left Updated: 05/03/24 1229     BCID, PCR Enterobacter cloacae complex. Identification by BCID2 PCR.     BOTTLE TYPE Pediatric Bottle    Narrative:      No resistance genes detected.    Urine Culture - Urine, Straight Cath [[id number]]  (Abnormal)  (Susceptibility) Collected: 05/02/24 1956    Lab Status: Final result Specimen: Urine from Straight Cath Updated: 05/06/24 1257     Urine Culture >100,000 CFU/mL Enterobacter cloacae complex CRE     Comment:   No carbapenemase detected.  Consider infectious disease consult.  Susceptibility results may not correlate to clinical outcomes.  Carbapenem resistant Enterobacteriaceae, patient may be an isolation risk.       Narrative:      Colonization of the urinary tract without infection is common. Treatment is discouraged unless the patient is symptomatic, pregnant, or undergoing an invasive urologic procedure.    Susceptibility        Enterobacter cloacae complex CRE      LICHA      Amoxicillin + Clavulanate Resistant      Cefazolin Resistant      Cefepime Susceptible      Ceftazidime Resistant      Ceftriaxone Resistant      Gentamicin Susceptible      Levofloxacin  Resistant      Nitrofurantoin Resistant      Piperacillin + Tazobactam Resistant      Trimethoprim + Sulfamethoxazole Resistant                           COVID-19, FLU A/B, RSV PCR 1 HR TAT - Swab, Nasopharynx [[id number]]  (Normal) Collected: 05/02/24 1826    Lab Status: Final result Specimen: Swab from Nasopharynx Updated: 05/02/24 1910     COVID19 Not Detected     Influenza A PCR Not Detected     Influenza B PCR Not Detected     RSV, PCR Not Detected    Narrative:      Fact sheet for providers: https://www.fda.gov/media/419616/download    Fact sheet for patients: https://www.fda.gov/media/306431/download    Test performed by PCR.        HPI  Tawny Shin is a 68 year-old female with a vast medical history to include lower paraplegia secondary to injury, neurogenic bladder previously on continuous catheter then transition to In/Out catheterizations, now patient does void on bedside commode, frequent UTIs, hypertension, chronic kidney disease stage IIIba, coronary artery disease with MI, iliopsoas abscess, discitis, osteomyelitis of lumbar spine, Staph aureus bacteremia, chronic pain syndrome, frequent falls, see below for complete list.  Recently admitted 4/19 - 4/23, 2024 with sepsis due to E. coli, initially treated with IV Rx, discharged on cefdinir 300 mg twice daily.  Per record review patient doxycycline 100 mg twice daily was ordered yesterday (5/1) after call placed to PCP.  Caregiver at bedside states she has not has had 1 dose as prescription was not obtained until today.  She returns today to [facility] ED with complaints of progressively worsening weakness over the past few days.  Sister and caregiver are at bedside stating patient is unable to transfer now even with assistance of 2.  Workup reveals decreased kidney function GFR 28.3 as compared to 48.3 on/19.  Elevated troponin noted, she denies chest pain, we will trend.,  Anemia of chronic disease, iron studies obtained yesterday.  Urinalysis again  "positive for acute cystitis.  I suspect patient has ongoing urinary retention with resultant UTI.  Record review reveals urine culture obtained on 4/19/2024 was without acute findings, awaiting repeat culture results.  Son arrived and provided substantial history information.  She has no other complaints.     Hospital Course:   UTI/bacteremia.  ID consult.  Cefepime, last day of antibiotics today..     Chronic stage IIIb renal failure.  Creatinine normalized.     Rheumatoid arthritis/Hx Sjogren .  Biotene.  Magic mouthwash. Arava.  Prednisone daily.     History of DVT . Eliquis.     CAD/hypertension/hyperlipidemia.  Aspirin. . Hold Lasix.  Imdur .  Continue Cozaar.  Nitro as needed.  Increase Coreg.     Depression/anxiety.  Cymbalta.     Hypokalemia.  Normal.  Hold p.o. potassium daily.     Anemia.  Hemoglobin increased.  No sign of acute bleed.     Thrombocytopenia.  Platelet count is normal.     Hypothyroidism . Synthroid.  High TSH.  Free T4 normal     Reflux.  Tums.  Zofran as needed.     Constipation.  Magnesium oxide as needed.  Constipation protocol  as needed.  .     Pain.  Voltaren gel as needed.  Lidocaine patch as needed.  Ultram as needed.     Nutrition . Folic acid.  Cardiac diet.  Boost supplement.     Deconditioning.  PT and OT consult.  Patient get around with a walker at baseline.     Blood culture- Enterobacter cloacae complex CRE  . Urine culture-Enterobacter cloacae complex CRE.     Dr. Shin's mom.    Blood pressure slightly high, increase Coreg back to home level today.  Afebrile.  Plan to discharge patient today after last dose of antibiotics.  Follow with PCP 1 week.  Continue with home care.  Patient refused home health and rehab placement.    Physical Exam on Discharge:  /78 (BP Location: Left arm, Patient Position: Lying)   Pulse 77   Temp 98.1 °F (36.7 °C) (Oral)   Resp 18   Ht 167.6 cm (66\")   Wt 98.7 kg (217 lb 9.6 oz)   LMP  (LMP Unknown)   SpO2 95%   BMI 35.12 kg/m² "   Physical Exam  Vitals and nursing note reviewed.   Constitutional:       Comments: Chronically ill.   HENT:      Head: Normocephalic.   Eyes:      Conjunctiva/sclera: Conjunctivae normal.      Pupils: Pupils are equal, round, and reactive to light.   Cardiovascular:      Rate and Rhythm: Normal rate and regular rhythm.      Heart sounds: Normal heart sounds.   Pulmonary:      Effort: Pulmonary effort is normal. No respiratory distress.      Breath sounds: Normal breath sounds.   Abdominal:      General: Bowel sounds are normal. There is no distension.      Palpations: Abdomen is soft.      Tenderness: There is no abdominal tenderness.      Comments: Obesity .   Musculoskeletal:         General: No swelling.      Cervical back: Neck supple.      Comments: Deformities of the hand from arthritis.   Skin:     General: Skin is warm and dry.      Capillary Refill: Capillary refill takes 2 to 3 seconds.      Findings: No rash.   Neurological:      Mental Status: She is alert and oriented to person, place, and time.      Motor: Weakness present.      Coordination: Coordination abnormal.      Gait: Gait abnormal.   Psychiatric:         Mood and Affect: Mood normal.         Behavior: Behavior normal.         Thought Content: Thought content normal.        Condition on Discharge: Stable.    Discharge Disposition: Plan to discharge patient home today with family.  Home or Self Care    Discharge Medications:     Discharge Medications        Continue These Medications        Instructions Start Date   acetaminophen 325 MG tablet  Commonly known as: TYLENOL   650 mg, Oral, Every 6 Hours PRN      apixaban 5 MG tablet tablet  Commonly known as: Eliquis   5 mg, Oral, 2 Times Daily      aspirin 81 MG EC tablet   81 mg, Oral, Daily      carvedilol 25 MG tablet  Commonly known as: COREG   25 mg, Oral, 2 Times Daily With Meals      Diclofenac Sodium 1 % gel gel  Commonly known as: VOLTAREN   4 g, Topical, 4 Times Daily PRN       DULoxetine 60 MG capsule  Commonly known as: CYMBALTA   60 mg, Oral, Daily      ferrous sulfate 324 (65 Fe) MG tablet delayed-release EC tablet   324 mg, Oral, Daily With Breakfast      folic acid 1 MG tablet  Commonly known as: FOLVITE   1 mg, Oral, Daily      isosorbide mononitrate 60 MG 24 hr tablet  Commonly known as: IMDUR   60 mg, Oral, Daily      leflunomide 20 MG tablet  Commonly known as: ARAVA   20 mg, Oral, Every Night at Bedtime      levothyroxine 75 MCG tablet  Commonly known as: SYNTHROID, LEVOTHROID   1 tablet, Oral, Daily      lidocaine 5 %  Commonly known as: Lidoderm   1 patch, Transdermal, Daily PRN, Remove & Discard patch within 12 hours or as directed by MD      losartan 50 MG tablet  Commonly known as: COZAAR   50 mg, Oral, Daily      magnesium hydroxide 400 MG/5ML suspension  Commonly known as: MILK OF MAGNESIA   30 mL, Oral, Daily PRN      multivitamin with minerals tablet tablet   1 tablet, Oral, Daily      nitroglycerin 0.4 MG SL tablet  Commonly known as: NITROSTAT   0.4 mg, Sublingual, Every 5 Minutes PRN, Take no more than 3 doses in 15 minutes.      ondansetron 4 MG tablet  Commonly known as: Zofran   4 mg, Oral, Every 8 Hours PRN      predniSONE 5 MG tablet  Commonly known as: DELTASONE   5 mg, Oral, Daily      valACYclovir 500 MG tablet  Commonly known as: VALTREX   500 mg, Oral, Daily PRN             Stop These Medications      doxycycline 100 MG capsule  Commonly known as: VIBRAMYCIN     furosemide 40 MG tablet  Commonly known as: LASIX                Discharge Diet:   Diet Instructions       Advance Diet As Tolerated -Target Diet: Cardiac      Target Diet: Cardiac            Activity at Discharge:   Activity Instructions       Activity as Tolerated              Follow-up Appointments:   Future Appointments   Date Time Provider Department Center   5/28/2024  1:15 PM Cheryl Joyce PT DPT [facility]   5/30/2024  2:00 PM Cheryl Joyce PT DPT [facility]   6/5/2024   1:00 PM Ximena Vazquez APRN [facility] CD [facility]AD PAD   6/19/2024 10:30 AM Ruthann Martinez APRN [facility] POD [facility]AD PAD   6/20/2024 10:15 AM Moises Oliveira MD [facility]Q PAD   1/24/2025 10:45 AM [facility] PAD DEVICE CHECK [facility] CD PAD PAD   1/24/2025 11:15 AM Omar Hernandez MD [facility] CD PAD PAD     Follow-up with PCP 1 week.    Electronically signed by Saulo Ambriz MD, 05/09/24, 11:44 CDT.    Time: Greater than 30 minutes.

## 2024-05-09 NOTE — PLAN OF CARE
Goal Outcome Evaluation:  Plan of Care Reviewed With: patient        Progress: improving  Outcome Evaluation: Pt was in bed, eager to work with therapy, caregivers present.  Performed bed mobility with CGA with HOB elevated.  Transfered sit to stand with CGA/min assist.  Practiced sit to stand x 5, focused on maintaining standing.  Pt able to hold stand with CGA with RWX at most for  40 seconds.  Pt able to take a few side steps with RWX and min assist.  Performed LE exercises in sitting and in fowlers.  Noticed that pt was having more word finding difficulty today and some increase confusion.

## 2024-05-09 NOTE — PLAN OF CARE
Goal Outcome Evaluation:  Plan of Care Reviewed With: patient        Progress: improving  Outcome Evaluation: OT tx completed. Pt seated in recliner upon therapist arrival; A&Ox4; No c/o pain; Caregiver also present. Pt performed all sit<>stand transfers to/from chair, bed, and toilet with Min A and verbal cues for sequencing and positioning of AD. Pt ambulated from chair>BR requiring seated rest break on bed on the way to the BR due to LE fatigue. Pt required Max A for clothing management during toilet task. Pt continues to benefit from skilled OT intervention in order to address remaining deficits in fxl mobility, fxl activity tolerance, balance, strength, and use of adaptive techniques/equipment during performance of BADLs. Recommend home with 24/7 assist and HH at discharge.      Anticipated Discharge Disposition (OT): home with 24/7 care, home with home health

## 2024-05-09 NOTE — PLAN OF CARE
Goal Outcome Evaluation:         Patient A/O x4, but patient is forgetful at times. Patient had no complaints of pain this shift. Patient rec'd IV abx overnight. Will update oncoming dayshift nurse at bedside shift report in the a.m. as appropriate.

## 2024-05-10 ENCOUNTER — TRANSITIONAL CARE MANAGEMENT TELEPHONE ENCOUNTER (OUTPATIENT)
Dept: CALL CENTER | Facility: HOSPITAL | Age: 69
End: 2024-05-10
Payer: MEDICARE

## 2024-05-10 NOTE — THERAPY DISCHARGE NOTE
Acute Care - Physical Therapy Discharge Summary  [facility]       Patient Name: Tawny Shin  : 1955  MRN: [id number]    Today's Date: 5/10/2024                 Admit Date: 2024      PT Recommendation and Plan    Visit Dx:    ICD-10-CM ICD-9-CM   1. Weakness  R53.1 780.79   2. Urinary tract infection without hematuria, site unspecified  N39.0 599.0   3. Dysphagia, unspecified type [R13.10]  R13.10 787.20   4. Generalized weakness [R53.1]  R53.1 780.79        Outcome Measures       Row Name 24 1059 24 1500 24 0920       How much help from another person do you currently need...    Turning from your back to your side while in flat bed without using bedrails? 3  -KINGS -- 3  -KINGS    Moving from lying on back to sitting on the side of a flat bed without bedrails? 3  -KINGS -- 3  -KINGS    Moving to and from a bed to a chair (including a wheelchair)? 3  -KINGS -- 2  -KINGS    Standing up from a chair using your arms (e.g., wheelchair, bedside chair)? 3  -KINGS -- 2  -KINGS    Climbing 3-5 steps with a railing? 1  -KINGS -- 1  -KINGS    To walk in hospital room? 2  -KINGS -- 1  -KINGS    AM-PAC 6 Clicks Score (PT) 15  -KINGS -- 12  -KINGS    Highest Level of Mobility Goal 4 --> Transfer to chair/commode  -KINGS -- 4 --> Transfer to chair/commode  -KINGS       How much help from another is currently needed...    Putting on and taking off regular lower body clothing? -- 2  -TS --    Bathing (including washing, rinsing, and drying) -- 3  -TS --    Toileting (which includes using toilet bed pan or urinal) -- 3  -TS --    Putting on and taking off regular upper body clothing -- 3  -TS --    Taking care of personal grooming (such as brushing teeth) -- 3  -TS --    Eating meals -- 4  -TS --    AM-PAC 6 Clicks Score (OT) -- 18  -TS --       Functional Assessment    Outcome Measure Options AM-PAC 6 Clicks Basic Mobility (PT)  -KINGS -- AM-PAC 6 Clicks Basic Mobility (PT)  -KINGS      Row Name 24 1400             How much help from another is  currently needed...    Putting on and taking off regular lower body clothing? 2  -TS      Bathing (including washing, rinsing, and drying) 3  -TS      Toileting (which includes using toilet bed pan or urinal) 3  -TS      Putting on and taking off regular upper body clothing 3  -TS      Taking care of personal grooming (such as brushing teeth) 3  -TS      Eating meals 4  -TS      AM-PAC 6 Clicks Score (OT) 18  -TS                User Key  (r) = Recorded By, (t) = Taken By, (c) = Cosigned By      Initials Name Provider Type    TS Carolee Giordano COTA Occupational Therapist Assistant    Garcia Jaramillo, SERENA Physical Therapist Assistant                         PT Rehab Goals       Row Name 05/10/24 1000             Bed Mobility Goal 1 (PT)    Activity/Assistive Device (Bed Mobility Goal 1, PT) bed mobility activities, all  -AB      Nicasio Level/Cues Needed (Bed Mobility Goal 1, PT) independent  -AB      Time Frame (Bed Mobility Goal 1, PT) long term goal (LTG);by discharge  -AB      Progress/Outcomes (Bed Mobility Goal 1, PT) goal not met  -AB         Transfer Goal 1 (PT)    Activity/Assistive Device (Transfer Goal 1, PT) sit-to-stand/stand-to-sit;bed-to-chair/chair-to-bed;walker, rolling  -AB      Nicasio Level/Cues Needed (Transfer Goal 1, PT) modified independence  -AB      Time Frame (Transfer Goal 1, PT) long term goal (LTG);by discharge  -AB      Progress/Outcome (Transfer Goal 1, PT) goal not met  -AB         Gait Training Goal 1 (PT)    Activity/Assistive Device (Gait Training Goal 1, PT) gait (walking locomotion);assistive device use;decrease fall risk;improve balance and speed;increase endurance/gait distance;walker, rolling  -AB      Nicasio Level (Gait Training Goal 1, PT) modified independence  -AB      Distance (Gait Training Goal 1, PT) 10ft  -AB      Time Frame (Gait Training Goal 1, PT) long term goal (LTG);by discharge  -AB      Progress/Outcome (Gait Training Goal 1, PT)  goal not met  -AB                User Key  (r) = Recorded By, (t) = Taken By, (c) = Cosigned By      Initials Name Provider Type Discipline    Kasie Carlos, PTA Physical Therapist Assistant PT                        PT Discharge Summary  Anticipated Discharge Disposition (PT): home with 24/7 care, home with outpatient therapy services  Reason for Discharge: Discharge from facility  Outcomes Achieved: Refer to plan of care for updates on goals achieved  Discharge Destination: Home with assist      Kasie Perkins PTA   5/10/2024

## 2024-05-10 NOTE — THERAPY DISCHARGE NOTE
Acute Care - Occupational Therapy Discharge Summary  [facility]     Patient Name: Tawny Shin  : 1955  MRN: [id number]    Today's Date: 5/10/2024       Date of Referral to OT: 24         Admit Date: 2024        OT Recommendation and Plan    Visit Dx:    ICD-10-CM ICD-9-CM   1. Weakness  R53.1 780.79   2. Urinary tract infection without hematuria, site unspecified  N39.0 599.0   3. Dysphagia, unspecified type [R13.10]  R13.10 787.20   4. Generalized weakness [R53.1]  R53.1 780.79                OT Rehab Goals       Row Name 05/10/24 0700             Dressing Goal 1 (OT)    Activity/Device (Dressing Goal 1, OT) upper body dressing;lower body dressing  -CS      McDonald/Cues Needed (Dressing Goal 1, OT) minimum assist (75% or more patient effort)  -CS      Time Frame (Dressing Goal 1, OT) long term goal (LTG)  -CS      Progress/Outcome (Dressing Goal 1, OT) goal not met  -CS         Toileting Goal 1 (OT)    Activity/Device (Toileting Goal 1, OT) adjust/manage clothing;perform perineal hygiene  -CS      McDonald Level/Cues Needed (Toileting Goal 1, OT) minimum assist (75% or more patient effort)  -CS      Time Frame (Toileting Goal 1, OT) long term goal (LTG)  -CS      Progress/Outcome (Toileting Goal 1, OT) goal not met  -CS         Strength Goal 1 (OT)    Strength Goal 1 (OT) Pt will be I with SILVIAE HEP for increased strength needed for transfers & ADLs  -CS      Time Frame (Strength Goal 1, OT) long term goal (LTG)  -CS      Progress/Outcome (Strength Goal 1, OT) goal not met  -CS                User Key  (r) = Recorded By, (t) = Taken By, (c) = Cosigned By      Initials Name Provider Type Discipline    CS Molly Linder, OTR/L, CNT Occupational Therapist OT                     Outcome Measures       Row Name 24 1059 24 1500 24 0920       How much help from another person do you currently need...    Turning from your back to your side while in flat bed without using  bedrails? 3  -KINGS -- 3  -KINGS    Moving from lying on back to sitting on the side of a flat bed without bedrails? 3  -KINGS -- 3  -KINGS    Moving to and from a bed to a chair (including a wheelchair)? 3  -KINGS -- 2  -KINGS    Standing up from a chair using your arms (e.g., wheelchair, bedside chair)? 3  -KINGS -- 2  -KINGS    Climbing 3-5 steps with a railing? 1  -KINGS -- 1  -KINGS    To walk in hospital room? 2  -KINGS -- 1  -KINGS    AM-PAC 6 Clicks Score (PT) 15  -KINGS -- 12  -KINGS    Highest Level of Mobility Goal 4 --> Transfer to chair/commode  -KINGS -- 4 --> Transfer to chair/commode  -KINGS       How much help from another is currently needed...    Putting on and taking off regular lower body clothing? -- 2  -TS --    Bathing (including washing, rinsing, and drying) -- 3  -TS --    Toileting (which includes using toilet bed pan or urinal) -- 3  -TS --    Putting on and taking off regular upper body clothing -- 3  -TS --    Taking care of personal grooming (such as brushing teeth) -- 3  -TS --    Eating meals -- 4  -TS --    AM-PAC 6 Clicks Score (OT) -- 18  -TS --       Functional Assessment    Outcome Measure Options AM-[facility] 6 Clicks Basic Mobility (PT)  -KINGS -- -[facility] 6 Clicks Basic Mobility (PT)  -KINGS      Row Name 05/07/24 1400 05/07/24 0924          How much help from another person do you currently need...    Turning from your back to your side while in flat bed without using bedrails? -- 3  -KINGS     Moving from lying on back to sitting on the side of a flat bed without bedrails? -- 3  -KINGS     Moving to and from a bed to a chair (including a wheelchair)? -- 2  -KINGS     Standing up from a chair using your arms (e.g., wheelchair, bedside chair)? -- 2  -KINGS     Climbing 3-5 steps with a railing? -- 1  -KINGS     To walk in hospital room? -- 1  -KINGS     AM-PAC 6 Clicks Score (PT) -- 12  -KINGS     Highest Level of Mobility Goal -- 4 --> Transfer to chair/commode  -KINGS        How much help from another is currently needed...    Putting on and taking off  regular lower body clothing? 2  -TS --     Bathing (including washing, rinsing, and drying) 3  -TS --     Toileting (which includes using toilet bed pan or urinal) 3  -TS --     Putting on and taking off regular upper body clothing 3  -TS --     Taking care of personal grooming (such as brushing teeth) 3  -TS --     Eating meals 4  -TS --     AM-PAC 6 Clicks Score (OT) 18  -TS --        Functional Assessment    Outcome Measure Options -- AM-PAC 6 Clicks Basic Mobility (PT)  -KINGS               User Key  (r) = Recorded By, (t) = Taken By, (c) = Cosigned By      Initials Name Provider Type    TS Carolee Giordano COTA Occupational Therapist Assistant    Garcia Jaramillo, PTA Physical Therapist Assistant                    Timed Therapy Charges  Total Units: 3      Suggested Charges  Total Units: 3      Procedure Name Documented Minutes Units Code    HC OT SELF CARE/MGMT/TRAIN EA 15 MIN 45 3   21255 (CPT®)                 Documented Minutes  Total Minutes: 45      Therapy Provided Minutes    64844 - OT Self Care/Mgmt Minutes 45                        OT Discharge Summary  Anticipated Discharge Disposition (OT): home with 24/7 care, home with home health  Reason for Discharge: Discharge from facility  Outcomes Achieved: Refer to plan of care for updates on goals achieved  Discharge Destination: Home with assist, Home with home health      Molly Linder, KATHYA/L, MARI  5/10/2024

## 2024-05-10 NOTE — PLAN OF CARE
Goal Outcome Evaluation:  Plan of Care Reviewed With: patient      Pt discharged to personal vehicle in wheelchair with lift team  Progress: improving

## 2024-05-12 ENCOUNTER — TRANSITIONAL CARE MANAGEMENT TELEPHONE ENCOUNTER (OUTPATIENT)
Dept: CALL CENTER | Facility: HOSPITAL | Age: 69
End: 2024-05-12
Payer: MEDICARE

## 2024-05-12 DIAGNOSIS — R78.81 BACTEREMIA: ICD-10-CM

## 2024-05-12 DIAGNOSIS — L89.319 PRESSURE INJURY OF SKIN OF RIGHT BUTTOCK, UNSPECIFIED INJURY STAGE: Chronic | ICD-10-CM

## 2024-05-12 DIAGNOSIS — F44.4 FUNCTIONAL NEUROLOGICAL SYMPTOM DISORDER WITH WEAKNESS OR PARALYSIS: Primary | ICD-10-CM

## 2024-05-12 DIAGNOSIS — M06.9 RHEUMATOID ARTHRITIS INVOLVING MULTIPLE SITES, UNSPECIFIED WHETHER RHEUMATOID FACTOR PRESENT: ICD-10-CM

## 2024-05-14 ENCOUNTER — HOME HEALTH ADMISSION (OUTPATIENT)
Dept: HOME HEALTH SERVICES | Facility: HOME HEALTHCARE | Age: 69
End: 2024-05-14
Payer: MEDICARE

## 2024-05-16 ENCOUNTER — OFFICE VISIT (OUTPATIENT)
Dept: INTERNAL MEDICINE | Facility: CLINIC | Age: 69
End: 2024-05-16
Payer: MEDICARE

## 2024-05-16 VITALS
DIASTOLIC BLOOD PRESSURE: 66 MMHG | HEART RATE: 76 BPM | SYSTOLIC BLOOD PRESSURE: 142 MMHG | TEMPERATURE: 97.5 F | OXYGEN SATURATION: 95 % | BODY MASS INDEX: 35.12 KG/M2 | HEIGHT: 66 IN

## 2024-05-16 DIAGNOSIS — B96.89 BACTEREMIA DUE TO ENTEROBACTER SPECIES: ICD-10-CM

## 2024-05-16 DIAGNOSIS — I50.32 CHRONIC HEART FAILURE WITH PRESERVED EJECTION FRACTION: ICD-10-CM

## 2024-05-16 DIAGNOSIS — G82.20 LOWER PARAPLEGIA: ICD-10-CM

## 2024-05-16 DIAGNOSIS — N39.0 FREQUENT URINARY TRACT INFECTIONS: ICD-10-CM

## 2024-05-16 DIAGNOSIS — Z87.898 HISTORY OF URINARY RETENTION: ICD-10-CM

## 2024-05-16 DIAGNOSIS — D50.9 IRON DEFICIENCY ANEMIA, UNSPECIFIED IRON DEFICIENCY ANEMIA TYPE: ICD-10-CM

## 2024-05-16 DIAGNOSIS — N18.32 STAGE 3B CHRONIC KIDNEY DISEASE: ICD-10-CM

## 2024-05-16 DIAGNOSIS — Z09 HOSPITAL DISCHARGE FOLLOW-UP: Primary | ICD-10-CM

## 2024-05-16 DIAGNOSIS — K12.30 STOMATITIS AND MUCOSITIS: ICD-10-CM

## 2024-05-16 DIAGNOSIS — I10 ESSENTIAL HYPERTENSION: Chronic | ICD-10-CM

## 2024-05-16 DIAGNOSIS — R78.81 BACTEREMIA DUE TO ENTEROBACTER SPECIES: ICD-10-CM

## 2024-05-16 DIAGNOSIS — R53.1 GENERALIZED WEAKNESS: ICD-10-CM

## 2024-05-16 DIAGNOSIS — K12.1 STOMATITIS AND MUCOSITIS: ICD-10-CM

## 2024-05-16 DIAGNOSIS — Z78.9 IMPAIRED ACTIVITIES OF DAILY LIVING: ICD-10-CM

## 2024-05-16 DIAGNOSIS — E03.9 HYPOTHYROIDISM, UNSPECIFIED TYPE: ICD-10-CM

## 2024-05-16 DIAGNOSIS — E78.5 HYPERLIPIDEMIA, UNSPECIFIED HYPERLIPIDEMIA TYPE: ICD-10-CM

## 2024-05-16 DIAGNOSIS — Z91.81 AT HIGH RISK FOR FALLS: ICD-10-CM

## 2024-05-16 RX ORDER — MULTIVIT-MIN/IRON/FOLIC ACID/K 18-600-40
1 CAPSULE ORAL DAILY
COMMUNITY

## 2024-05-16 RX ORDER — TRAMADOL HYDROCHLORIDE 50 MG/1
50 TABLET ORAL EVERY 12 HOURS PRN
COMMUNITY

## 2024-05-16 NOTE — PROGRESS NOTES
Transitional Care Follow Up Visit  Subjective     Tawny Shin is a 68 y.o. female who presents for a transitional care management visit.    Within 48 business hours after discharge our office contacted her via telephone to coordinate her care and needs.      I reviewed and discussed the details of that call along with the discharge summary, hospital problems, inpatient lab results, inpatient diagnostic studies, and consultation reports with Tawny.     Current outpatient and discharge medications have been reconciled for the patient.  Reviewed by: DANIELA Gonzalez          5/9/2024     8:48 PM   Date of TCM Phone Call   [facility]   Date of Admission 5/2/2024   Date of Discharge 5/9/2024   Discharge Disposition Home or Self Care     Risk for Readmission (LACE) Score: 15 (5/9/2024  5:00 AM)      History of Present Illness   Course During Hospital Stay:  Tawny Shin is a 68 year-old female with a vast medical history to include lower paraplegia secondary to injury, neurogenic bladder previously on continuous catheter then transition to In/Out catheterizations, now patient does void on bedside commode, frequent UTIs, hypertension, chronic kidney disease stage IIIba, coronary artery disease with MI, iliopsoas abscess, discitis, osteomyelitis of lumbar spine, Staph aureus bacteremia, chronic pain syndrome, frequent falls, see below for complete list.  Recently admitted 4/19 - 4/23, 2024 with sepsis due to E. coli, initially treated with IV Rx, discharged on cefdinir 300 mg twice daily.  Per record review patient doxycycline 100 mg twice daily was ordered yesterday (5/1) after call placed to PCP.  Caregiver at bedside states she has not has had 1 dose as prescription was not obtained until today.  She returns today to [facility] ED with complaints of progressively worsening weakness over the past few days.  Sister and caregiver are at bedside stating patient is unable to transfer now even  with assistance of 2.  Workup reveals decreased kidney function GFR 28.3 as compared to 48.3 on/19.  Elevated troponin noted, she denies chest pain, we will trend.,  Anemia of chronic disease, iron studies obtained yesterday.  Urinalysis again positive for acute cystitis.  I suspect patient has ongoing urinary retention with resultant UTI.  Record review reveals urine culture obtained on 4/19/2024 was without acute findings, awaiting repeat culture results.  Son arrived and provided substantial history information.  She has no other complaints. -Her hospital course was significant for ID consultation, was ultimately placed on cefepime, last day of administration on date of discharge (7 days total of tx).  During the course of her stay her creatinine normalized.  She did receive treatment from both PT and OT, by time of discharge she was able to get around with walker at baseline per report.    Blood cultures obtained on 5/2/2024 with findings of -Enterobacter cloacae complex CRE.  Urine cultures grew Enterobacter cloacae complex CRE.  Appears blood cultures drawn on 5/3/2024 with BREONNA no growth at 5 days.    Of note, she had recent admission prior to this with E. coli bacteremia in April 2024.  Does have noted history of prolonged MSSA bacteremia.    Declined home health and rehabilitation placement prior to discharge.    During admission Lasix was held, based on report it appears that may have initially decreased her carvedilol however then returned back to her baseline dosage prior to discharge related to hypertension.  She was maintained on Cozaar. On aspirin.        Recommended close monitoring post discharge on ability to completely void.     The following portions of the patient's history were reviewed and updated as appropriate: allergies, current medications, past family history, past medical history, past social history, past surgical history, and problem list.    Review of Systems    Objective   Physical  Exam  Vitals and nursing note reviewed.   Constitutional:       General: She is not in acute distress.     Appearance: She is obese. She is ill-appearing (Chronically). She is not toxic-appearing or diaphoretic.   HENT:      Head: Normocephalic and atraumatic.   Eyes:      Extraocular Movements: Extraocular movements intact.      Conjunctiva/sclera: Conjunctivae normal.      Pupils: Pupils are equal, round, and reactive to light.   Cardiovascular:      Rate and Rhythm: Normal rate and regular rhythm.      Pulses: Normal pulses.      Heart sounds: Normal heart sounds.   Pulmonary:      Effort: Pulmonary effort is normal.      Breath sounds: Normal breath sounds.   Abdominal:      General: Bowel sounds are normal. There is no distension.      Palpations: Abdomen is soft.      Tenderness: There is no abdominal tenderness. There is no right CVA tenderness or left CVA tenderness.   Musculoskeletal:      Cervical back: Normal range of motion and neck supple.      Right lower leg: Edema (2+) present.      Left lower leg: Edema (2+) present.   Skin:     General: Skin is warm and dry.      Coloration: Skin is jaundiced (Sclera - slight).   Neurological:      General: No focal deficit present.      Mental Status: She is alert and oriented to person, place, and time. Mental status is at baseline.      Motor: Weakness present.      Gait: Gait abnormal (In motorized wheelchair, unable to stand long enough to obtain weight today).   Psychiatric:         Mood and Affect: Mood normal.         Behavior: Behavior normal.         Thought Content: Thought content normal.         Judgment: Judgment normal.         Assessment & Plan   Diagnoses and all orders for this visit:    1. Hospital discharge follow-up (Primary)    2. Bacteremia due to Enterobacter species  -     Cancel: Basic metabolic panel  -     CBC w AUTO Differential  -     Comprehensive metabolic panel  -     Ambulatory Referral to Home Health    3. History of urinary  retention  -     Ambulatory Referral to Home Health    4. Frequent urinary tract infections  -     Ambulatory Referral to Home Health    5. Iron deficiency anemia, unspecified iron deficiency anemia type    6. Stage 3b chronic kidney disease  -     Comprehensive metabolic panel  -     Ambulatory Referral to Home Health    7. Hyperlipidemia, unspecified hyperlipidemia type    8. Hypothyroidism, unspecified type  -     TSH Rfx On Abnormal To Free T4; Future    9. Essential hypertension  -     Comprehensive metabolic panel  -     Ambulatory Referral to Home Health    10. Chronic heart failure with preserved ejection fraction  -     Ambulatory Referral to Home Health    11. Lower paraplegia  -     Ambulatory Referral to Home Health    12. Generalized weakness  -     Ambulatory Referral to Home Health    13. At high risk for falls  -     Ambulatory Referral to Home Health    14. Impaired activities of daily living  -     Ambulatory Referral to Home Health    15. Stomatitis and mucositis           Plan of care and recent hospitalization reviewed with Mrs. Shin  When asked how she feels now compared to when she was discharged from the hospital she reports the same, she states she does not feel well.  She does not feel like she has been regaining any of her strength.  She currently denies any foul/strong odor of her urine, dysuria, flank pain, fevers, dysuria.  She reports she feels that she is urinating appropriately.  She confirms that her bowels are moving regularly without issue.  She is an established patient with urology, I have asked her to please schedule an upcoming appointment with them for further evaluation to see if there is also additional recommendations, there is some concern that she may be having recurrent urinary retention that could be contributing to these recurrent UTIs.  She reports being given samples of Gemtesa in the past that seem to help with urinary incontinence, medication was expensive so  she was unable to continue.    She does report that she has noted improvement of the previous foul odor that she had been having chronically with the initiation of boric acid suppositories that are being compounded for her at [facility].  I did also advise her that these are available to order online on [employer] if desired.   Her blood pressure is mildly elevated today at 142/66, she reports normotensive readings at home.  I have encouraged her to add checking her temperature to her daily vitals and to report elevations in temperature immediately.    We also discussed the importance of her continuing to increase her daily water intake.  At discharged her BMP revealed a GFR 63.8, normal creatinine, BUN, electrolytes stable.  Lipid panel was assessed during admission, total cholesterol 207, triglycerides 266, HDL 26,  (improved from previous evaluation in January).    TSH was noted to be elevated during admission at 8.870, T4 free 1.07 - plan having this reassessed in 6 weeks time.  She has not noted any changes such as worsening constipation, does report increased fatigue, denies any notable hair changes but does feel like her nails have become more fragile and break more easily.    CBC prior to discharge revealed hemoglobin 9.4, hematocrit 31.6, total RBC count 3.22, this was a gradual improvements, her iron studies were assessed fairly recently in the beginning of May as well and appears stable -continue with iron supplementation in addition to B12 supplementation.  She confirms she also does take vitamin C.    Her Lasix was discontinued during this most recent hospitalization, she does have chronic edema to her lower extremities, 2+ on examination today, she has known congestive heart failure.  I advised that she needs to closely monitor for signs of shortness of breath, orthopnea, worsening lower extremity edema as we would likely want to reinitiate the furosemide in these cases.  She  expresses understanding and will report issues if they occur.    We did discuss her hyperlipidemia today, she is not able to take statins, reports a other previous agent was not affordable and then most recently Repatha was too expensive for her.  Upon my review of her most recent lipid panel although her cholesterol is still elevated it appears dramatically improved from previous evaluation.  She is unable to confirm whether or not any of these evaluations were done with her fasting.  Her upcoming appointment with Dr. Oliveira is in the morning time, I have advised for her to be fasting for that appointment so a lipid panel can be rechecked.    I addressed home health with her today, reportedly she declined SNF/home health, she confirms that she declined going to a skilled nursing facility however states that she would very much appreciate getting home health as she has benefited with this in the past.  Referral has been placed.  If possible it would be great if there were some way they nursing care could assess postvoid residuals.    She suffers from stomatitis and reports having noted therapeutic benefit with the Magic mouthwash that was administered during her hospitalization, I will call [facility] pharmacy and have this compounded and available for her with refills.    I have encouraged to return to the office as needed, call with any questions or concerns, otherwise keep upcoming appointment with Dr. Oliveira in June.    Please note that portions of this note were completed with a voice recognition program.     Electronically signed by DANIELA Gonzalez, 05/17/24, 08:21 CDT.

## 2024-05-17 ENCOUNTER — HOME HEALTH ADMISSION (OUTPATIENT)
Dept: HOME HEALTH SERVICES | Facility: HOME HEALTHCARE | Age: 69
End: 2024-05-17
Payer: MEDICARE

## 2024-05-17 LAB
ALBUMIN SERPL-MCNC: 3.2 G/DL (ref 3.5–5.2)
ALBUMIN/GLOB SERPL: 1.1 G/DL
ALP SERPL-CCNC: 59 U/L (ref 39–117)
ALT SERPL-CCNC: 22 U/L (ref 1–33)
AST SERPL-CCNC: 28 U/L (ref 1–32)
BASOPHILS # BLD AUTO: 0.05 10*3/MM3 (ref 0–0.2)
BASOPHILS NFR BLD AUTO: 0.8 % (ref 0–1.5)
BILIRUB SERPL-MCNC: <0.2 MG/DL (ref 0–1.2)
BUN SERPL-MCNC: 19 MG/DL (ref 8–23)
BUN/CREAT SERPL: 16.4 (ref 7–25)
CALCIUM SERPL-MCNC: 9 MG/DL (ref 8.6–10.5)
CHLORIDE SERPL-SCNC: 109 MMOL/L (ref 98–107)
CO2 SERPL-SCNC: 23.9 MMOL/L (ref 22–29)
CREAT SERPL-MCNC: 1.16 MG/DL (ref 0.57–1)
EGFRCR SERPLBLD CKD-EPI 2021: 50.8 ML/MIN/1.73
EOSINOPHIL # BLD AUTO: 0.14 10*3/MM3 (ref 0–0.4)
EOSINOPHIL NFR BLD AUTO: 2.2 % (ref 0.3–6.2)
ERYTHROCYTE [DISTWIDTH] IN BLOOD BY AUTOMATED COUNT: 16.6 % (ref 12.3–15.4)
GLOBULIN SER CALC-MCNC: 2.8 GM/DL
GLUCOSE SERPL-MCNC: 84 MG/DL (ref 65–99)
HCT VFR BLD AUTO: 29.1 % (ref 34–46.6)
HGB BLD-MCNC: 9 G/DL (ref 12–15.9)
IMM GRANULOCYTES # BLD AUTO: 0.06 10*3/MM3 (ref 0–0.05)
IMM GRANULOCYTES NFR BLD AUTO: 0.9 % (ref 0–0.5)
LYMPHOCYTES # BLD AUTO: 2.05 10*3/MM3 (ref 0.7–3.1)
LYMPHOCYTES NFR BLD AUTO: 32.3 % (ref 19.6–45.3)
MCH RBC QN AUTO: 29.1 PG (ref 26.6–33)
MCHC RBC AUTO-ENTMCNC: 30.9 G/DL (ref 31.5–35.7)
MCV RBC AUTO: 94.2 FL (ref 79–97)
MONOCYTES # BLD AUTO: 0.65 10*3/MM3 (ref 0.1–0.9)
MONOCYTES NFR BLD AUTO: 10.2 % (ref 5–12)
NEUTROPHILS # BLD AUTO: 3.4 10*3/MM3 (ref 1.7–7)
NEUTROPHILS NFR BLD AUTO: 53.6 % (ref 42.7–76)
PLATELET # BLD AUTO: 153 10*3/MM3 (ref 140–450)
POTASSIUM SERPL-SCNC: 4.7 MMOL/L (ref 3.5–5.2)
PROT SERPL-MCNC: 6 G/DL (ref 6–8.5)
RBC # BLD AUTO: 3.09 10*6/MM3 (ref 3.77–5.28)
SODIUM SERPL-SCNC: 143 MMOL/L (ref 136–145)
WBC # BLD AUTO: 6.35 10*3/MM3 (ref 3.4–10.8)

## 2024-05-17 NOTE — PROGRESS NOTES
Her labs are stable, needs to continue to try to incorporate iron rich foods in addition to current iron supplementation, make sure to include appropriate amounts of lean proteins with meals, snacks.

## 2024-05-21 ENCOUNTER — HOME CARE VISIT (OUTPATIENT)
Dept: HOME HEALTH SERVICES | Facility: CLINIC | Age: 69
End: 2024-05-21
Payer: MEDICARE

## 2024-05-21 ENCOUNTER — TELEPHONE (OUTPATIENT)
Dept: INTERNAL MEDICINE | Facility: CLINIC | Age: 69
End: 2024-05-21
Payer: MEDICARE

## 2024-05-21 ENCOUNTER — TELEPHONE (OUTPATIENT)
Dept: HOME HEALTH SERVICES | Facility: HOME HEALTHCARE | Age: 69
End: 2024-05-21
Payer: MEDICARE

## 2024-05-21 VITALS
WEIGHT: 200 LBS | BODY MASS INDEX: 32.14 KG/M2 | HEIGHT: 66 IN | RESPIRATION RATE: 18 BRPM | OXYGEN SATURATION: 92 % | SYSTOLIC BLOOD PRESSURE: 122 MMHG | DIASTOLIC BLOOD PRESSURE: 76 MMHG | HEART RATE: 76 BPM

## 2024-05-21 PROCEDURE — G0299 HHS/HOSPICE OF RN EA 15 MIN: HCPCS

## 2024-05-21 NOTE — TELEPHONE ENCOUNTER
"Per Cecilia Smith RN nurse  \"I did a SOC on her today and she needs a Speech therapy evaluation.  She had difficulty swallowing her medication and needs eval for breathe support\"    Call placed to provider office and detailed message left on Shira Cannon voicemail, advised can take verbal order if provider is agreeable? Awaiting return call  "

## 2024-05-21 NOTE — CASE COMMUNICATION
"SOC Note:  68 years old female with past history of paraplegia recently hospitalized from 4/19 thru 4/23 for sepsis with ecoli and sent home and then went back to ER due to not getting any better and was hospitalized from 5/2/24 till 5/9/24 UTI, CHF.  Patient reports had mass on right lower back removed a month ago and reports having some redness and bleeding from site. MD notifed.   Patient has 24 hour at home care.   Patient reports d iet was down graded in hospital to a soft diet due to patient having difficulyt swallowing regular consistency food.  ST referral made.   Home Health ordered for: disciplines  SN, PT, OT, ST    Reason for Hosp/Primary Dx/Co-morbidities: UTI    Focus of Care: CHF, urinary retention    Patient's goal(s): \"to feel better\"    Current Functional status/mobility/DME:  wheelchair electric, requires 24 hour care, assist of 1 for ADL's    HB sta tus/Living Arrangements: SOA with minimal extertion, fatigue and weakness.  Patient requires assistance of another person to leave home due to taking effort    Skin Integrity/wound status:  redness and drainage right lower back post surgical incsion.    Code Status:  DNR    Fall Risk/Safety concerns: weakness, recent hospitalization, requires assistance from another person to leave home    Educated on Emergency Plan, steps to take prior  to going to the ER and when to Call Home Health [facility]:  educated at inhome visit     Medication issues/Concerns: none noted at visit    Additional Problems/Concerns: difficultly swallowing regular food and pills, ST referral made    SDOH Barriers (i.e. caregiver concerns, social isolation, transportation, food insecurity, environment, income etc.)/Need for MSW:  none    Plan for next visit: assess cardiopulmonary, medication management"

## 2024-05-21 NOTE — HOME HEALTH
"SOC Note:  68 years old female with past history of paraplegia recently hospitalized from 4/19 thru 4/23 for sepsis with ecoli and sent home and then went back to ER due to not getting any better and was hospitalized from 5/2/24 till 5/9/24 UTI, CHF.  Patient reports had mass on right lower back removed a month ago and reports having some redness and bleeding from site. MD notifed.   Patient has 24 hour at home care.   Patient reports diet was down graded in hospital to a soft diet due to patient having difficulyt swallowing regular consistency food.  ST referral made.   Home Health ordered for: disciplines  SN, PT, OT, ST    Reason for Hosp/Primary Dx/Co-morbidities: UTI    Focus of Care: CHF, urinary retention    Patient's goal(s): \"to feel better\"    Current Functional status/mobility/DME:  wheelchair electric, requires 24 hour care, assist of 1 for ADL's    HB status/Living Arrangements: SOA with minimal extertion, fatigue and weakness.  Patient requires assistance of another person to leave home due to taking effort    Skin Integrity/wound status:  redness and drainage right lower back post surgical incsion.    Code Status:  DNR    Fall Risk/Safety concerns: weakness, recent hospitalization, requires assistance from another person to leave home    Educated on Emergency Plan, steps to take prior to going to the ER and when to Call Home Health [facility]:  educated at inhome visit     Medication issues/Concerns: none noted at visit    Additional Problems/Concerns: difficultly swallowing regular food and pills, ST referral made    SDOH Barriers (i.e. caregiver concerns, social isolation, transportation, food insecurity, environment, income etc.)/Need for MSW:  none    Plan for next visit: assess cardiopulmonary, medication management"

## 2024-05-21 NOTE — TELEPHONE ENCOUNTER
"Relay     \"Her labs are stable, needs to continue to try to incorporate iron rich foods in addition to current iron supplementation, make sure to include appropriate amounts of lean proteins with meals, snacks. \"                "

## 2024-05-21 NOTE — TELEPHONE ENCOUNTER
Morenita with [facility] called ([phone]) stating the referral to home health mentioned doing a bladder scan, but they are not able to do that.  She is asking if you want them to do I&O cath UA?  I couldn't find anything about that on the home health referral.  Wasn't sure whether to send question to you or to Dr. Oliveira.  Please advise.

## 2024-05-22 ENCOUNTER — HOME CARE VISIT (OUTPATIENT)
Dept: HOME HEALTH SERVICES | Facility: CLINIC | Age: 69
End: 2024-05-22
Payer: MEDICARE

## 2024-05-22 VITALS
DIASTOLIC BLOOD PRESSURE: 64 MMHG | RESPIRATION RATE: 16 BRPM | HEART RATE: 88 BPM | TEMPERATURE: 98.4 F | SYSTOLIC BLOOD PRESSURE: 148 MMHG | OXYGEN SATURATION: 94 %

## 2024-05-22 VITALS
HEART RATE: 88 BPM | TEMPERATURE: 98.4 F | RESPIRATION RATE: 16 BRPM | SYSTOLIC BLOOD PRESSURE: 148 MMHG | DIASTOLIC BLOOD PRESSURE: 64 MMHG | OXYGEN SATURATION: 94 %

## 2024-05-22 PROCEDURE — G0151 HHCP-SERV OF PT,EA 15 MIN: HCPCS

## 2024-05-22 PROCEDURE — G0152 HHCP-SERV OF OT,EA 15 MIN: HCPCS

## 2024-05-22 NOTE — TELEPHONE ENCOUNTER
Never received return call from providers office and called again requesting Speech Therapy evaluation d/t difficulty swallowing and for breath support?   Call was answered and placed on hold for transferred to Doris Ayala and verbal order taken for ST evaluation for swallowing difficulty and breath support.

## 2024-05-22 NOTE — Clinical Note
Routine Visit Note: PT evaluation. 2w4    Skill/education provided: PWC armrest and headrest adjustments, postural education in PWC, vitals recorded, no med changes, no pain reported, Pt/CG instructed in R knee extension when sitting in recliner with roll under her R ankle, physical assessment with sit<>stand transfer PWC<>RW.    Patient/caregiver response: improved positioning and posture in PWC, vitals stable, CG stated she would instruct other CG's in knee ext, patient unable to perform stand step PWC>bed w/ RW as she stated she normally does, R knee flexed and unable to advance LLE, BUE weakness as well.    Plan for next visit: Written HEP to be issued and instructed in sitting, vitals, meds, pain, training with PWC transfers <>RW<>bed<>toilet    Other pertinent info: none

## 2024-05-22 NOTE — HOME HEALTH
OCCUPATIONAL THERAPY EVALUATION    REASON FOR REFERRAL-  Patient referred due to recent hospital stay and decline in strength and ADL's.   PRIMARY DIAGNOSIS- hypertensive heart and CKD   SECONDARY DIAGNOSIS-  RA, a-fib  SURGICAL PROCEDURES-  no    PREVIOUS OCCUPATIONAL THERAPY- yes  OXYGEN USE- no    CLINICAL FINDINGS:  WOUND / SKIN CONDITION- no issues  EDEMA-  B LE        EQUIPMENT : BSC, walk in shower, shower chair, hospital bed, hand held shower, power wheelchair                         DRIVING- no          FUNCTIONAL ENDURANCE FOR SAFE ACTIVITIES OF DAILY LIVING / INSTRUMENTAL ACTIVITIES OF DAILY LIVING: poor       WEIGHT BEARING STATUS/PRECAUTIONS-  WBAT   ASSISTIVE DEVICE-  power wheelchair     ACTIVITIES OF DAILY LIVING MOBILITY/FALLS RISK ASSESSMENT- at risk for falls due to weakness, impaired gait and balance, dependence on AD      MEDICAL NECESSITY- Skilled OT medically necessary to address UB strength and ADL independence.    PATIENT GOAL FOR THIS EPISODE OF CARE- to get back to outpatient therapy    TODAY'S INTERVENTIONS- Skill/education provided: Patient/caregiver response:   Initial eval completed. Goals and POC discussed with patient and caregiver and ADL training initiated. Patient education on toilet transfers from scooter, using grab bars and gait belt with min/modA Patient complains of fatigue. Patient and caregiver educated on positioning in power chair due to patients weakness in trunk. Patient and caregiver verbalize understanding.      REHAB POTENTIAL-   good for stated goals    DATE OF NEXT APPOINTMENT WITH DOCTOR-  pending  PATIENT / CAREGIVER AGREE WITH DISCHARGE PLAN- yes  COMMUNICATION / CARE COORDINATION- Staff re: OT eval.  PLAN FOR NEXT VISIT: Introduce HEP

## 2024-05-24 ENCOUNTER — TELEPHONE (OUTPATIENT)
Age: 69
End: 2024-05-24
Payer: MEDICARE

## 2024-05-24 ENCOUNTER — APPOINTMENT (OUTPATIENT)
Dept: ULTRASOUND IMAGING | Facility: HOSPITAL | Age: 69
DRG: 872 | End: 2024-05-24
Payer: MEDICARE

## 2024-05-24 ENCOUNTER — HOME CARE VISIT (OUTPATIENT)
Dept: HOME HEALTH SERVICES | Facility: CLINIC | Age: 69
End: 2024-05-24
Payer: MEDICARE

## 2024-05-24 ENCOUNTER — APPOINTMENT (OUTPATIENT)
Dept: GENERAL RADIOLOGY | Facility: HOSPITAL | Age: 69
DRG: 872 | End: 2024-05-24
Payer: MEDICARE

## 2024-05-24 ENCOUNTER — APPOINTMENT (OUTPATIENT)
Dept: CT IMAGING | Facility: HOSPITAL | Age: 69
DRG: 872 | End: 2024-05-24
Payer: MEDICARE

## 2024-05-24 ENCOUNTER — HOSPITAL ENCOUNTER (INPATIENT)
Facility: HOSPITAL | Age: 69
LOS: 7 days | Discharge: HOME | DRG: 872 | End: 2024-05-31
Attending: STUDENT IN AN ORGANIZED HEALTH CARE EDUCATION/TRAINING PROGRAM | Admitting: FAMILY MEDICINE
Payer: MEDICARE

## 2024-05-24 DIAGNOSIS — Z74.09 IMPAIRED MOBILITY: ICD-10-CM

## 2024-05-24 DIAGNOSIS — E87.70 HYPERVOLEMIA, UNSPECIFIED HYPERVOLEMIA TYPE: Primary | ICD-10-CM

## 2024-05-24 DIAGNOSIS — R13.10 DYSPHAGIA, UNSPECIFIED TYPE: ICD-10-CM

## 2024-05-24 DIAGNOSIS — E87.79 OTHER HYPERVOLEMIA: ICD-10-CM

## 2024-05-24 DIAGNOSIS — D50.9 IRON DEFICIENCY ANEMIA, UNSPECIFIED IRON DEFICIENCY ANEMIA TYPE: ICD-10-CM

## 2024-05-24 DIAGNOSIS — R53.81 DEBILITY: ICD-10-CM

## 2024-05-24 DIAGNOSIS — I50.32 CHRONIC HEART FAILURE WITH PRESERVED EJECTION FRACTION: ICD-10-CM

## 2024-05-24 DIAGNOSIS — I25.10 CORONARY ARTERY DISEASE INVOLVING NATIVE CORONARY ARTERY OF NATIVE HEART WITHOUT ANGINA PECTORIS: ICD-10-CM

## 2024-05-24 DIAGNOSIS — R13.19 ESOPHAGEAL DYSPHAGIA: ICD-10-CM

## 2024-05-24 DIAGNOSIS — R93.5 ABNORMAL CT OF THE ABDOMEN: ICD-10-CM

## 2024-05-24 LAB
ALBUMIN SERPL-MCNC: 2.9 G/DL (ref 3.5–5.2)
ALBUMIN/GLOB SERPL: 0.9 G/DL
ALP SERPL-CCNC: 66 U/L (ref 39–117)
ALT SERPL W P-5'-P-CCNC: 30 U/L (ref 1–33)
ANION GAP SERPL CALCULATED.3IONS-SCNC: 10 MMOL/L (ref 5–15)
AST SERPL-CCNC: 39 U/L (ref 1–32)
BACTERIA UR QL AUTO: ABNORMAL /HPF
BASOPHILS # BLD AUTO: 0.03 10*3/MM3 (ref 0–0.2)
BASOPHILS NFR BLD AUTO: 0.5 % (ref 0–1.5)
BILIRUB SERPL-MCNC: 0.7 MG/DL (ref 0–1.2)
BILIRUB UR QL STRIP: NEGATIVE
BUN SERPL-MCNC: 15 MG/DL (ref 8–23)
BUN/CREAT SERPL: 13.9 (ref 7–25)
CALCIUM SPEC-SCNC: 8.6 MG/DL (ref 8.6–10.5)
CHLORIDE SERPL-SCNC: 103 MMOL/L (ref 98–107)
CLARITY UR: ABNORMAL
CO2 SERPL-SCNC: 23 MMOL/L (ref 22–29)
COLOR UR: YELLOW
CREAT SERPL-MCNC: 1.08 MG/DL (ref 0.57–1)
D DIMER PPP FEU-MCNC: 2.4 MCGFEU/ML (ref 0–0.7)
D-LACTATE SERPL-SCNC: 0.9 MMOL/L (ref 0.5–2)
DEPRECATED RDW RBC AUTO: 60.5 FL (ref 37–54)
EGFRCR SERPLBLD CKD-EPI 2021: 55.4 ML/MIN/1.73
EOSINOPHIL # BLD AUTO: 0.01 10*3/MM3 (ref 0–0.4)
EOSINOPHIL NFR BLD AUTO: 0.2 % (ref 0.3–6.2)
ERYTHROCYTE [DISTWIDTH] IN BLOOD BY AUTOMATED COUNT: 17.7 % (ref 12.3–15.4)
FLUAV RNA RESP QL NAA+PROBE: NOT DETECTED
FLUBV RNA RESP QL NAA+PROBE: NOT DETECTED
GLOBULIN UR ELPH-MCNC: 3.1 GM/DL
GLUCOSE BLDC GLUCOMTR-MCNC: 144 MG/DL (ref 70–130)
GLUCOSE SERPL-MCNC: 105 MG/DL (ref 65–99)
GLUCOSE UR STRIP-MCNC: NEGATIVE MG/DL
HCT VFR BLD AUTO: 26.7 % (ref 34–46.6)
HGB BLD-MCNC: 8.2 G/DL (ref 12–15.9)
HGB UR QL STRIP.AUTO: ABNORMAL
HOLD SPECIMEN: NORMAL
HYALINE CASTS UR QL AUTO: ABNORMAL /LPF
IMM GRANULOCYTES # BLD AUTO: 0.09 10*3/MM3 (ref 0–0.05)
IMM GRANULOCYTES NFR BLD AUTO: 1.4 % (ref 0–0.5)
KETONES UR QL STRIP: NEGATIVE
LEUKOCYTE ESTERASE UR QL STRIP.AUTO: ABNORMAL
LYMPHOCYTES # BLD AUTO: 1.35 10*3/MM3 (ref 0.7–3.1)
LYMPHOCYTES NFR BLD AUTO: 20.3 % (ref 19.6–45.3)
MAGNESIUM SERPL-MCNC: 1.5 MG/DL (ref 1.6–2.4)
MCH RBC QN AUTO: 29.1 PG (ref 26.6–33)
MCHC RBC AUTO-ENTMCNC: 30.7 G/DL (ref 31.5–35.7)
MCV RBC AUTO: 94.7 FL (ref 79–97)
MONOCYTES # BLD AUTO: 0.75 10*3/MM3 (ref 0.1–0.9)
MONOCYTES NFR BLD AUTO: 11.3 % (ref 5–12)
NEUTROPHILS NFR BLD AUTO: 4.42 10*3/MM3 (ref 1.7–7)
NEUTROPHILS NFR BLD AUTO: 66.3 % (ref 42.7–76)
NITRITE UR QL STRIP: POSITIVE
NRBC BLD AUTO-RTO: 0.5 /100 WBC (ref 0–0.2)
NT-PROBNP SERPL-MCNC: 5126 PG/ML (ref 0–900)
PH UR STRIP.AUTO: 6.5 [PH] (ref 5–8)
PLATELET # BLD AUTO: 115 10*3/MM3 (ref 140–450)
PMV BLD AUTO: 11.4 FL (ref 6–12)
POTASSIUM SERPL-SCNC: 3.6 MMOL/L (ref 3.5–5.2)
PROT SERPL-MCNC: 6 G/DL (ref 6–8.5)
PROT UR QL STRIP: ABNORMAL
RBC # BLD AUTO: 2.82 10*6/MM3 (ref 3.77–5.28)
RBC # UR STRIP: ABNORMAL /HPF
REF LAB TEST METHOD: ABNORMAL
SARS-COV-2 RNA RESP QL NAA+PROBE: NOT DETECTED
SODIUM SERPL-SCNC: 136 MMOL/L (ref 136–145)
SP GR UR STRIP: 1.01 (ref 1–1.03)
SQUAMOUS #/AREA URNS HPF: ABNORMAL /HPF
UROBILINOGEN UR QL STRIP: ABNORMAL
WBC # UR STRIP: ABNORMAL /HPF
WBC NRBC COR # BLD AUTO: 6.65 10*3/MM3 (ref 3.4–10.8)
WHOLE BLOOD HOLD COAG: NORMAL
WHOLE BLOOD HOLD SPECIMEN: NORMAL

## 2024-05-24 PROCEDURE — 25510000001 IOPAMIDOL PER 1 ML: Performed by: STUDENT IN AN ORGANIZED HEALTH CARE EDUCATION/TRAINING PROGRAM

## 2024-05-24 PROCEDURE — 85025 COMPLETE CBC W/AUTO DIFF WBC: CPT | Performed by: STUDENT IN AN ORGANIZED HEALTH CARE EDUCATION/TRAINING PROGRAM

## 2024-05-24 PROCEDURE — 83880 ASSAY OF NATRIURETIC PEPTIDE: CPT | Performed by: STUDENT IN AN ORGANIZED HEALTH CARE EDUCATION/TRAINING PROGRAM

## 2024-05-24 PROCEDURE — 87636 SARSCOV2 & INF A&B AMP PRB: CPT | Performed by: STUDENT IN AN ORGANIZED HEALTH CARE EDUCATION/TRAINING PROGRAM

## 2024-05-24 PROCEDURE — 80053 COMPREHEN METABOLIC PANEL: CPT | Performed by: STUDENT IN AN ORGANIZED HEALTH CARE EDUCATION/TRAINING PROGRAM

## 2024-05-24 PROCEDURE — 63710000001 PREDNISONE PER 5 MG: Performed by: FAMILY MEDICINE

## 2024-05-24 PROCEDURE — 25010000002 FUROSEMIDE PER 20 MG: Performed by: INTERNAL MEDICINE

## 2024-05-24 PROCEDURE — 25010000002 MAGNESIUM SULFATE 2 GM/50ML SOLUTION: Performed by: STUDENT IN AN ORGANIZED HEALTH CARE EDUCATION/TRAINING PROGRAM

## 2024-05-24 PROCEDURE — 71275 CT ANGIOGRAPHY CHEST: CPT

## 2024-05-24 PROCEDURE — 43239 EGD BIOPSY SINGLE/MULTIPLE: CPT | Performed by: INTERNAL MEDICINE

## 2024-05-24 PROCEDURE — 83605 ASSAY OF LACTIC ACID: CPT | Performed by: STUDENT IN AN ORGANIZED HEALTH CARE EDUCATION/TRAINING PROGRAM

## 2024-05-24 PROCEDURE — 99285 EMERGENCY DEPT VISIT HI MDM: CPT

## 2024-05-24 PROCEDURE — 36415 COLL VENOUS BLD VENIPUNCTURE: CPT

## 2024-05-24 PROCEDURE — 71045 X-RAY EXAM CHEST 1 VIEW: CPT

## 2024-05-24 PROCEDURE — 82668 ASSAY OF ERYTHROPOIETIN: CPT | Performed by: FAMILY MEDICINE

## 2024-05-24 PROCEDURE — 81001 URINALYSIS AUTO W/SCOPE: CPT | Performed by: STUDENT IN AN ORGANIZED HEALTH CARE EDUCATION/TRAINING PROGRAM

## 2024-05-24 PROCEDURE — 93010 ELECTROCARDIOGRAM REPORT: CPT | Performed by: INTERNAL MEDICINE

## 2024-05-24 PROCEDURE — 99222 1ST HOSP IP/OBS MODERATE 55: CPT | Performed by: INTERNAL MEDICINE

## 2024-05-24 PROCEDURE — 82948 REAGENT STRIP/BLOOD GLUCOSE: CPT

## 2024-05-24 PROCEDURE — 87077 CULTURE AEROBIC IDENTIFY: CPT | Performed by: STUDENT IN AN ORGANIZED HEALTH CARE EDUCATION/TRAINING PROGRAM

## 2024-05-24 PROCEDURE — 85379 FIBRIN DEGRADATION QUANT: CPT | Performed by: STUDENT IN AN ORGANIZED HEALTH CARE EDUCATION/TRAINING PROGRAM

## 2024-05-24 PROCEDURE — 25010000002 FUROSEMIDE PER 20 MG: Performed by: STUDENT IN AN ORGANIZED HEALTH CARE EDUCATION/TRAINING PROGRAM

## 2024-05-24 PROCEDURE — 87186 SC STD MICRODIL/AGAR DIL: CPT | Performed by: STUDENT IN AN ORGANIZED HEALTH CARE EDUCATION/TRAINING PROGRAM

## 2024-05-24 PROCEDURE — 76705 ECHO EXAM OF ABDOMEN: CPT

## 2024-05-24 PROCEDURE — G0299 HHS/HOSPICE OF RN EA 15 MIN: HCPCS

## 2024-05-24 PROCEDURE — 87040 BLOOD CULTURE FOR BACTERIA: CPT | Performed by: STUDENT IN AN ORGANIZED HEALTH CARE EDUCATION/TRAINING PROGRAM

## 2024-05-24 PROCEDURE — 83735 ASSAY OF MAGNESIUM: CPT | Performed by: STUDENT IN AN ORGANIZED HEALTH CARE EDUCATION/TRAINING PROGRAM

## 2024-05-24 PROCEDURE — 93005 ELECTROCARDIOGRAM TRACING: CPT | Performed by: STUDENT IN AN ORGANIZED HEALTH CARE EDUCATION/TRAINING PROGRAM

## 2024-05-24 PROCEDURE — 87086 URINE CULTURE/COLONY COUNT: CPT | Performed by: STUDENT IN AN ORGANIZED HEALTH CARE EDUCATION/TRAINING PROGRAM

## 2024-05-24 PROCEDURE — 25010000002 CEFEPIME PER 500 MG: Performed by: FAMILY MEDICINE

## 2024-05-24 PROCEDURE — 87154 CUL TYP ID BLD PTHGN 6+ TRGT: CPT | Performed by: STUDENT IN AN ORGANIZED HEALTH CARE EDUCATION/TRAINING PROGRAM

## 2024-05-24 RX ORDER — ONDANSETRON 2 MG/ML
4 INJECTION INTRAMUSCULAR; INTRAVENOUS EVERY 6 HOURS PRN
Status: DISCONTINUED | OUTPATIENT
Start: 2024-05-24 | End: 2024-05-31 | Stop reason: HOSPADM

## 2024-05-24 RX ORDER — FUROSEMIDE 10 MG/ML
20 INJECTION INTRAMUSCULAR; INTRAVENOUS ONCE
Status: COMPLETED | OUTPATIENT
Start: 2024-05-24 | End: 2024-05-24

## 2024-05-24 RX ORDER — ISOSORBIDE MONONITRATE 60 MG/1
60 TABLET, EXTENDED RELEASE ORAL DAILY
Status: DISCONTINUED | OUTPATIENT
Start: 2024-05-24 | End: 2024-05-31 | Stop reason: HOSPADM

## 2024-05-24 RX ORDER — ASCORBIC ACID 500 MG
250 TABLET ORAL DAILY
Status: DISCONTINUED | OUTPATIENT
Start: 2024-05-24 | End: 2024-05-31 | Stop reason: HOSPADM

## 2024-05-24 RX ORDER — LOSARTAN POTASSIUM 50 MG/1
50 TABLET ORAL DAILY
Status: DISCONTINUED | OUTPATIENT
Start: 2024-05-24 | End: 2024-05-31 | Stop reason: HOSPADM

## 2024-05-24 RX ORDER — LEFLUNOMIDE 20 MG/1
20 TABLET ORAL DAILY
Status: DISCONTINUED | OUTPATIENT
Start: 2024-05-24 | End: 2024-05-31 | Stop reason: HOSPADM

## 2024-05-24 RX ORDER — AMOXICILLIN 250 MG
2 CAPSULE ORAL 2 TIMES DAILY PRN
Status: DISCONTINUED | OUTPATIENT
Start: 2024-05-24 | End: 2024-05-31 | Stop reason: HOSPADM

## 2024-05-24 RX ORDER — SODIUM CHLORIDE 0.9 % (FLUSH) 0.9 %
10 SYRINGE (ML) INJECTION AS NEEDED
Status: DISCONTINUED | OUTPATIENT
Start: 2024-05-24 | End: 2024-05-31 | Stop reason: HOSPADM

## 2024-05-24 RX ORDER — DIPHENHYDRAMINE HYDROCHLORIDE AND LIDOCAINE HYDROCHLORIDE AND ALUMINUM HYDROXIDE AND MAGNESIUM HYDRO
5 KIT EVERY 6 HOURS
Status: DISCONTINUED | OUTPATIENT
Start: 2024-05-24 | End: 2024-05-31 | Stop reason: HOSPADM

## 2024-05-24 RX ORDER — SODIUM CHLORIDE 9 MG/ML
40 INJECTION, SOLUTION INTRAVENOUS AS NEEDED
Status: DISCONTINUED | OUTPATIENT
Start: 2024-05-24 | End: 2024-05-31 | Stop reason: HOSPADM

## 2024-05-24 RX ORDER — FOLIC ACID 1 MG/1
1000 TABLET ORAL DAILY
Status: DISCONTINUED | OUTPATIENT
Start: 2024-05-24 | End: 2024-05-31 | Stop reason: HOSPADM

## 2024-05-24 RX ORDER — FUROSEMIDE 10 MG/ML
80 INJECTION INTRAMUSCULAR; INTRAVENOUS ONCE
Status: COMPLETED | OUTPATIENT
Start: 2024-05-24 | End: 2024-05-24

## 2024-05-24 RX ORDER — FAMOTIDINE 10 MG/ML
20 INJECTION, SOLUTION INTRAVENOUS 2 TIMES DAILY
Status: DISCONTINUED | OUTPATIENT
Start: 2024-05-24 | End: 2024-05-26

## 2024-05-24 RX ORDER — ALBUTEROL SULFATE 1.25 MG/3ML
1.25 SOLUTION RESPIRATORY (INHALATION) EVERY 6 HOURS PRN
Status: DISCONTINUED | OUTPATIENT
Start: 2024-05-24 | End: 2024-05-31 | Stop reason: HOSPADM

## 2024-05-24 RX ORDER — MAGNESIUM SULFATE HEPTAHYDRATE 40 MG/ML
2 INJECTION, SOLUTION INTRAVENOUS ONCE
Status: COMPLETED | OUTPATIENT
Start: 2024-05-24 | End: 2024-05-24

## 2024-05-24 RX ORDER — PREDNISONE 5 MG/1
5 TABLET ORAL DAILY
Status: DISCONTINUED | OUTPATIENT
Start: 2024-05-24 | End: 2024-05-31 | Stop reason: HOSPADM

## 2024-05-24 RX ORDER — LEVOTHYROXINE SODIUM 0.07 MG/1
75 TABLET ORAL
Status: DISCONTINUED | OUTPATIENT
Start: 2024-05-24 | End: 2024-05-31 | Stop reason: HOSPADM

## 2024-05-24 RX ORDER — MULTIPLE VITAMINS W/ MINERALS TAB 9MG-400MCG
1 TAB ORAL DAILY
Status: DISCONTINUED | OUTPATIENT
Start: 2024-05-24 | End: 2024-05-31 | Stop reason: HOSPADM

## 2024-05-24 RX ORDER — FLUORIDE TOOTHPASTE
15 TOOTHPASTE DENTAL
Status: DISCONTINUED | OUTPATIENT
Start: 2024-05-24 | End: 2024-05-31 | Stop reason: HOSPADM

## 2024-05-24 RX ORDER — ACETAMINOPHEN 325 MG/1
650 TABLET ORAL EVERY 6 HOURS PRN
Status: DISCONTINUED | OUTPATIENT
Start: 2024-05-24 | End: 2024-05-31 | Stop reason: HOSPADM

## 2024-05-24 RX ORDER — VALACYCLOVIR HYDROCHLORIDE 500 MG/1
500 TABLET, FILM COATED ORAL DAILY PRN
Status: DISCONTINUED | OUTPATIENT
Start: 2024-05-24 | End: 2024-05-30

## 2024-05-24 RX ORDER — BISACODYL 5 MG/1
5 TABLET, DELAYED RELEASE ORAL DAILY PRN
Status: DISCONTINUED | OUTPATIENT
Start: 2024-05-24 | End: 2024-05-31 | Stop reason: HOSPADM

## 2024-05-24 RX ORDER — NITROGLYCERIN 0.4 MG/1
0.4 TABLET SUBLINGUAL
Status: DISCONTINUED | OUTPATIENT
Start: 2024-05-24 | End: 2024-05-31 | Stop reason: HOSPADM

## 2024-05-24 RX ORDER — TRAMADOL HYDROCHLORIDE 50 MG/1
50 TABLET ORAL EVERY 12 HOURS PRN
Status: DISCONTINUED | OUTPATIENT
Start: 2024-05-24 | End: 2024-05-31 | Stop reason: HOSPADM

## 2024-05-24 RX ORDER — DULOXETIN HYDROCHLORIDE 30 MG/1
30 CAPSULE, DELAYED RELEASE ORAL DAILY
Status: DISCONTINUED | OUTPATIENT
Start: 2024-05-24 | End: 2024-05-31 | Stop reason: HOSPADM

## 2024-05-24 RX ORDER — CARVEDILOL 25 MG/1
25 TABLET ORAL 2 TIMES DAILY WITH MEALS
Status: DISCONTINUED | OUTPATIENT
Start: 2024-05-24 | End: 2024-05-31 | Stop reason: HOSPADM

## 2024-05-24 RX ORDER — ASPIRIN 81 MG/1
81 TABLET ORAL DAILY
Status: DISCONTINUED | OUTPATIENT
Start: 2024-05-24 | End: 2024-05-31 | Stop reason: HOSPADM

## 2024-05-24 RX ORDER — LIDOCAINE 4 G/G
2 PATCH TOPICAL
Status: DISCONTINUED | OUTPATIENT
Start: 2024-05-24 | End: 2024-05-31 | Stop reason: HOSPADM

## 2024-05-24 RX ORDER — SODIUM CHLORIDE 0.9 % (FLUSH) 0.9 %
10 SYRINGE (ML) INJECTION EVERY 12 HOURS SCHEDULED
Status: DISCONTINUED | OUTPATIENT
Start: 2024-05-24 | End: 2024-05-31 | Stop reason: HOSPADM

## 2024-05-24 RX ORDER — FERROUS SULFATE 325(65) MG
325 TABLET ORAL
Status: DISCONTINUED | OUTPATIENT
Start: 2024-05-25 | End: 2024-05-31 | Stop reason: HOSPADM

## 2024-05-24 RX ORDER — POLYETHYLENE GLYCOL 3350 17 G/17G
17 POWDER, FOR SOLUTION ORAL DAILY PRN
Status: DISCONTINUED | OUTPATIENT
Start: 2024-05-24 | End: 2024-05-31 | Stop reason: HOSPADM

## 2024-05-24 RX ORDER — BISACODYL 10 MG
10 SUPPOSITORY, RECTAL RECTAL DAILY PRN
Status: DISCONTINUED | OUTPATIENT
Start: 2024-05-24 | End: 2024-05-31 | Stop reason: HOSPADM

## 2024-05-24 RX ADMIN — IOPAMIDOL 72 ML: 755 INJECTION, SOLUTION INTRAVENOUS at 13:07

## 2024-05-24 RX ADMIN — Medication 10 ML: at 21:21

## 2024-05-24 RX ADMIN — LEFLUNOMIDE 20 MG: 20 TABLET ORAL at 16:11

## 2024-05-24 RX ADMIN — LEVOTHYROXINE SODIUM 75 MCG: 0.07 TABLET ORAL at 16:12

## 2024-05-24 RX ADMIN — TRAMADOL HYDROCHLORIDE 50 MG: 50 TABLET ORAL at 16:19

## 2024-05-24 RX ADMIN — FUROSEMIDE 80 MG: 10 INJECTION, SOLUTION INTRAVENOUS at 11:46

## 2024-05-24 RX ADMIN — APIXABAN 5 MG: 5 TABLET, FILM COATED ORAL at 21:20

## 2024-05-24 RX ADMIN — PREDNISONE 5 MG: 5 TABLET ORAL at 16:10

## 2024-05-24 RX ADMIN — FUROSEMIDE 20 MG: 10 INJECTION, SOLUTION INTRAMUSCULAR; INTRAVENOUS at 23:26

## 2024-05-24 RX ADMIN — ISOSORBIDE MONONITRATE 60 MG: 60 TABLET, EXTENDED RELEASE ORAL at 16:13

## 2024-05-24 RX ADMIN — CEFEPIME 2000 MG: 2 INJECTION, POWDER, FOR SOLUTION INTRAVENOUS at 15:34

## 2024-05-24 RX ADMIN — CARVEDILOL 25 MG: 25 TABLET, FILM COATED ORAL at 19:18

## 2024-05-24 RX ADMIN — OXYCODONE HYDROCHLORIDE AND ACETAMINOPHEN 250 MG: 500 TABLET ORAL at 16:11

## 2024-05-24 RX ADMIN — LIDOCAINE 2 PATCH: 4 PATCH TOPICAL at 16:14

## 2024-05-24 RX ADMIN — MAGNESIUM SULFATE HEPTAHYDRATE 2 G: 2 INJECTION, SOLUTION INTRAVENOUS at 13:24

## 2024-05-24 RX ADMIN — LOSARTAN POTASSIUM 50 MG: 50 TABLET, FILM COATED ORAL at 16:13

## 2024-05-24 RX ADMIN — DIPHENHYDRAMINE HYDROCHLORIDE AND LIDOCAINE HYDROCHLORIDE AND ALUMINUM HYDROXIDE AND MAGNESIUM HYDRO 5 ML: KIT at 16:14

## 2024-05-24 RX ADMIN — Medication 1 TABLET: at 16:13

## 2024-05-24 RX ADMIN — ASPIRIN 81 MG: 81 TABLET, COATED ORAL at 16:11

## 2024-05-24 RX ADMIN — FAMOTIDINE 20 MG: 10 INJECTION INTRAVENOUS at 22:00

## 2024-05-24 RX ADMIN — DIPHENHYDRAMINE HYDROCHLORIDE AND LIDOCAINE HYDROCHLORIDE AND ALUMINUM HYDROXIDE AND MAGNESIUM HYDRO 5 ML: KIT at 21:21

## 2024-05-24 RX ADMIN — DULOXETINE HYDROCHLORIDE 30 MG: 30 CAPSULE, DELAYED RELEASE ORAL at 16:12

## 2024-05-24 RX ADMIN — FOLIC ACID 1000 MCG: 1 TABLET ORAL at 16:13

## 2024-05-24 NOTE — PLAN OF CARE
Goal Outcome Evaluation:  Plan of Care Reviewed With: patient        Progress: no change  Outcome Evaluation: Pt c/o pain x1 so far since arrival to floor; IV abx, IID in between; incontinent, external catheter in place; turn q2hr; safety maintained.

## 2024-05-24 NOTE — CONSULTS
[facility] Gastroenterology  Inpatient Consult Note  Today's date:  05/24/24    Tawny Shin  1955       Referring Provider: Saulo Ambriz MD  Primary Physician: Moises Oliveira MD       Date of Admission: 5/24/2024  Date of Service:  05/24/24    Reason for Consultation/Chief Complaint: Cholelithiasis.  Thickened esophagus on CT.    History of present illness: 68-year-old woman who was admitted earlier today through the emergency room with complaint of generalized progressive weakness over the last couple weeks, lower extremity edema, progressive shortness of breath.  As part of her initial workup patient underwent CT angiography, which revealed small bilateral pleural effusions, gallstone in the gallbladder neck or cystic duct with possibly distended gallbladder which was not optimally evaluated on the chest CT.  Also diffuse esophageal thickening in the mid and distal esophagus was found, which was new comparing to the CT from April 2022.  Subsequently patient doing went ultrasound which revealed appropriate size and position of the gallbladder, 9 mm gallstone within the gallbladder, no gallbladder wall thickening or pericholecystic fluid, no biliary dilation and CBD was only 4 mm.  Blood work revealed normal white blood cell count of 6.6 thousand, mild thrombocytopenia with a platelet count of 115,000 and normocytic anemia with hemoglobin of 8.2 and hematocrit of 26.7 and that appears to be her baseline H&H over the last several years.  CMP was remarkable for minimal elevation of creatinine of 1.08, minimally elevated glucose at 105, low albumin of 2.9 and otherwise normal LFTs with exception of minimal elevation of AST of 39, but normal ALT, alk phos and total bilirubin.  Patient denies any abdominal pain and specifically any right upper quadrant pain.  No nausea or vomiting.  Bowel movements remain quite regular with a normal formed stool.  Patient denies any black stools, blood in  stools and no rectal bleedings.  Patient did notice problems swallowing actually both liquids and solids over the last couple of months, and on a few occasions had to regurgitate the food back.  Denies odynophagia.  Denies any weight loss.  Patient did have an upper endoscopy for evaluation of anemia back in 2014 and it did not reveal any significant abnormalities.  Patient also had a screening colonoscopy for evaluation of iron deficiency anemia back in 2011 and it did not reveal any significant abnormalities with exception of some small nonspecific ulceration in the ascending colon.     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    Elevated cholesterol     Eustachian tube dysfunction     Heart disease     Herpes simplex     History of transfusion     Hyperlipidemia     Hypertension     Hypothyroidism 12/23/2020    Intrinsic asthma 12/23/2020    Knee dislocation     Labral tear of right hip joint     Laryngitis sicca     Laryngitis, chronic     Left carotid bruit 03/09/2016    MI (myocardial infarction)     Myalgia due to statin 06/25/2019    Open wound of right hip 09/14/2021    Osteomyelitis of right femur 07/06/2021    Otorrhea     Pacemaker 11/17/2016    Primary osteoarthritis of left knee 12/23/2020    Psoriasis vulgaris 12/23/2020    S/P coronary artery stent placement 03/09/2016    Sensorineural hearing loss     Seropositive rheumatoid arthritis of multiple sites 12/27/2019    Overview:   "-myochrysine '93-'96 -methotrexate '96--->11/98;r/s  restarted 2/99--> 8/14 (anemia) -sulfasalazine- not effective -penicillamine 6/98-->10/98; no effect -leflunomide 11/98--> - Humira '13-->didn't take - Enbrel 12/14-->3/15- no effect!   Last Assessment & Plan:  - \"aching all over\" because she had to be off her anti-rheumatic drugs for 2 weeks in preparation for her R knee surgery - he    Sick sinus syndrome 12/27/2019    Sjogren's disease     Spondylolisthesis of lumbar region 01/17/2018    Syncope, recurrent 02/08/2021    Urinary tract infection     UTI (urinary tract infection) 04/19/2024       Past Surgical History:   Procedure Laterality Date    A-V CARDIAC PACEMAKER INSERTION  2016    ATRIAL CARDIAC PACEMAKER INSERTION      CARDIAC CATHETERIZATION      CATARACT EXTRACTION      CERVICAL CORPECTOMY N/A 3/3/2021    Procedure: CERVICAL 6 CORPECTOMY WITH TITANIUM CAGE WITH NEURO MONITORING;  Surgeon: Bandar Shea MD;  Location:  [facility] OR;  Service: Neurosurgery;  Laterality: N/A;    COLONOSCOPY  11/08/2011    One fold in the ascending colon which showed ulcer otherwise normal exam    COLONOSCOPY  11/12/2004    Normal exam repeat in five years    CORONARY ANGIOPLASTY WITH STENT PLACEMENT      X 2; 2013 & 2014    ENDOSCOPY  07/10/2014    Normal exam    FLAP LEG Right 9/14/2021    Procedure: RIGHT GLUTEAL FASCIOCUTANEOUS ADVANCEMENT FLAP AND RIGHT TENSOR FASCIAL JESSICA FLAP;  Surgeon: Amadeo Turner MD;  Location:  [facility] OR;  Service: Plastics;  Laterality: Right;    HIP ABDUCTION TENOTOMY BILATERAL Right 1/14/2021    Procedure: RIGHT HIP GLUTEUS MEDLUS / MINIMUS REPAIR, POSSIBLE ACHILLES ALLOGRAFT;  Surgeon: Nino Carlson MD;  Location:  [facility];  Service: Orthopedics;  Laterality: Right;    INCISION AND DRAINAGE ABSCESS Right 6/4/2022    Procedure: INCISION AND DRAINAGE ABSCESS right hip;  Surgeon: Magda Salcido MD;  Location:  [facility];  Service: General;  Laterality: Right;    INCISION AND " DRAINAGE ABSCESS Right 6/10/2022    Procedure: RIGHT HIP INCISION AND DRAINAGE. MD NEEDS 3L VANC IRRIGATION, CURRETTES, DAICANS, KERLEX ROLLS;  Surgeon: Amadeo Turner MD;  Location:  [facility];  Service: Plastics;  Laterality: Right;    INCISION AND DRAINAGE HIP Right 2/9/2021    Procedure: HIP INCISION AND DRAINAGE;  Surgeon: Nino Carlson MD;  Location:  [facility];  Service: Orthopedics;  Laterality: Right;    INCISION AND DRAINAGE LEG Right 10/24/2021    Procedure: INCISION AND DRAINAGE LOWER EXTREMITY;  Surgeon: Amadeo Turner MD;  Location:  [facility];  Service: Plastics;  Laterality: Right;    INCISION AND DRAINAGE OF WOUND Right 7/8/2021    Procedure: INCISION AND DRAINAGE WOUND RIGHT HIP;  Surgeon: James Huntley MD;  Location:  [facility];  Service: Orthopedics;  Laterality: Right;    JOINT REPLACEMENT      KYPHOPLASTY WITH BIOPSY Bilateral 10/26/2021    Procedure: THOARCIC 12 KYPHOPLASTY WITH BIOPSY;  Surgeon: Bandar Shea MD;  Location:  [facility];  Service: Neurosurgery;  Laterality: Bilateral;    LEG DEBRIDEMENT Right 9/14/2021    Procedure: DEBRIDEMENT OF RIGHT HIP WOUND, RIGHT GLUTEAL FASCIOCUTANEOUS ADVANCEMENT FLAP AND RIGHT TENSOR FASCIAL JESSICA FLAP;  Surgeon: Amadeo Turner MD;  Location:  [facility];  Service: Plastics;  Laterality: Right;    LUMBAR DISCECTOMY Right 3/23/2021    Procedure: LUMBAR DISCECTOMY MICRO, Lumbar 1/2 right;  Surgeon: Bandar Shea MD;  Location:  [facility];  Service: Neurosurgery;  Laterality: Right;    LUMBAR FUSION N/A 1/19/2018    Procedure: L3-4,L4-5 DECOMPRESSION, POSTERIOR SPINAL FUSION WITH INSTRUMENTATION;  Surgeon: Fortino Oropeza MD;  Location:  [facility];  Service:     LUMBAR LAMINECTOMY WITH FUSION Left 1/17/2018    Procedure: LEFT L3-4 L4-5 LATERAL LUMBAR INTERBODY FUSION;  Surgeon: Fortino Oropeza MD;  Location:  [facility];  Service:     MASS EXCISION Right 4/23/2024    Procedure: RIGHT BUTTOCK MASS EXCISION;  Surgeon:  Moises Keeys MD;  Location: [facility] OR;  Service: General;  Laterality: Right;    MYRINGOTOMY W/ TUBES  09/04/2014    TUBES NO LONGER IN PLACE    OTHER SURGICAL HISTORY      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 so hardware was removed and spacers were placed    REPLACEMENT TOTAL KNEE Right         Allergies   Allergen Reactions    Atorvastatin Other (See Comments)     LEG CRAMPS      Amoxicillin Rash    Escitalopram Rash    Nabumetone Rash    Niacin Er Rash    Penicillin G Rash    Penicillins Rash    Simvastatin Rash       Medications Prior to Admission   Medication Sig Dispense Refill Last Dose    acetaminophen (TYLENOL) 325 MG tablet Take 2 tablets by mouth Every 6 (Six) Hours As Needed for Mild Pain (mild pain). Indications: Fever, Pain       apixaban (Eliquis) 5 MG tablet tablet Take 1 tablet by mouth 2 (Two) Times a Day.       Ascorbic Acid (Vitamin C) 500 MG capsule Take 1 tablet by mouth Daily. Indications: Inadequate Vitamin C       aspirin 81 MG EC tablet Take 1 tablet by mouth Daily. Indications: Disease involving Lipid Deposits in the Arteries       carvedilol (COREG) 25 MG tablet Take 1 tablet by mouth 2 (Two) Times a Day With Meals. Indications: High Blood Pressure Disorder       Diclofenac Sodium (VOLTAREN) 1 % gel gel Apply 4 g topically to the appropriate area as directed 4 (Four) Times a Day As Needed (Mild pain). Indications: Joint Damage causing Pain and Loss of Function       DULoxetine (CYMBALTA) 60 MG capsule Take 1 capsule by mouth Daily. Indications: Musculoskeletal Pain       ferrous sulfate 324 (65 Fe) MG tablet delayed-release EC tablet Take 1 tablet by mouth Daily With Breakfast. Indications: Iron Deficiency       folic acid (FOLVITE) 1 MG tablet Take 1 tablet by mouth Daily. Indications: Anemia From Inadequate Folic Acid       isosorbide mononitrate (IMDUR) 60 MG 24 hr tablet Take 1 tablet by mouth Daily. Indications: Stable Angina Pectoris       leflunomide (ARAVA) 20 MG tablet Take 1 tablet  by mouth every night at bedtime. Indications: Rheumatoid Arthritis 90 tablet 3     levothyroxine (SYNTHROID, LEVOTHROID) 75 MCG tablet Take 1 tablet by mouth Daily. Indications: Underactive Thyroid       lidocaine (Lidoderm) 5 % Place 1 patch on the skin as directed by provider Daily As Needed for Mild Pain. Remove & Discard patch within 12 hours or as directed by MD 30 each 2     losartan (COZAAR) 50 MG tablet Take 1 tablet by mouth Daily. Indications: High Blood Pressure Disorder       magnesium hydroxide (MILK OF MAGNESIA) 400 MG/5ML suspension Take 30 mL by mouth Daily As Needed for Constipation. Indications: Constipation       multivitamin with minerals tablet tablet Take 1 tablet by mouth Daily.       nitroglycerin (NITROSTAT) 0.4 MG SL tablet Place 1 tablet under the tongue Every 5 (Five) Minutes As Needed for Chest Pain. Take no more than 3 doses in 15 minutes.  Indications: Acute Angina Pectoris       ondansetron (Zofran) 4 MG tablet Take 1 tablet by mouth Every 8 (Eight) Hours As Needed for Nausea or Vomiting. 45 tablet 0     predniSONE (DELTASONE) 5 MG tablet Take 1 tablet by mouth Daily. Indications: Acute Joint Inflammation in Gout       traMADol (ULTRAM) 50 MG tablet Take 1 tablet by mouth Every 12 (Twelve) Hours As Needed for Moderate Pain. Indications: Pain       valACYclovir (VALTREX) 500 MG tablet Take 1 tablet by mouth Daily As Needed. Indications: [facility] Medications (active)         Dose Frequency Start End    acetaminophen (TYLENOL) tablet 650 mg 650 mg Every 6 Hours PRN 5/24/2024 --    Admin Instructions: If given for fever, use fever parameter: fever greater than 100.4 °F  Based on patient request - if ordered for moderate or severe pain, provider allows for administration of a medication prescribed for a lower pain scale.    Do not exceed 4 grams of acetaminophen in a 24 hr period. Max dose of 2gm for AST/ALT greater than 120 units/L.    If given for pain, use the  "following pain scale:   Mild Pain = Pain Score of 1-3, CPOT 1-2  Moderate Pain = Pain Score of 4-6, CPOT 3-4  Severe Pain = Pain Score of 7-10, CPOT 5-8    Route: Oral    apixaban (ELIQUIS) tablet 5 mg 5 mg 2 Times Daily 5/24/2024 --    Admin Instructions: Tablet may be crushed and suspended in 60 mL of water or D5W and immediately delivered via NG tube.    Route: Oral    ascorbic acid (VITAMIN C) tablet 250 mg 250 mg Daily 5/24/2024 --    Route: Oral    aspirin EC tablet 81 mg 81 mg Daily 5/24/2024 --    Admin Instructions: Do not crush or chew the capsules or tablets. The drug may not work as designed if the capsule or tablet is crushed or chewed. Swallow whole.  Do not exceed 4 grams of aspirin in a 24 hr period.    If given for pain, use the following pain scale:   Mild Pain = Pain Score of 1-3, CPOT 1-2  Moderate Pain = Pain Score of 4-6, CPOT 3-4  Severe Pain = Pain Score of 7-10, CPOT 5-8    Route: Oral    Biotene dry mouth liquid 15 mL 15 mL 5 Times Daily PRN 5/24/2024 --    Admin Instructions: Rinse mouth for 30 seconds, then spit out    Route: Swish & Spit    bisacodyl (DULCOLAX) EC tablet 5 mg 5 mg Daily PRN 5/24/2024 --    Admin Instructions: Use if no bowel movement after 12 hours.  Swallow whole. Do not crush, split, or chew tablet.    Route: Oral    Linked Group 1: Placed in \"And\" Linked Group        bisacodyl (DULCOLAX) suppository 10 mg 10 mg Daily PRN 5/24/2024 --    Admin Instructions: Use if no bowel movement after 12 hours.  Hold for diarrhea    Route: Rectal    Linked Group 1: Placed in \"And\" Linked Group        carvedilol (COREG) tablet 25 mg 25 mg 2 Times Daily With Meals 5/24/2024 --    Admin Instructions: Hold for SBP less than 100, DBP less than 60, or heart rate less than 50. If a dose is held, please contact the provider.  Give with food.    Route: Oral    cefepime 2000 mg IVPB in 100 mL NS (MBP) 2,000 mg Every 12 Hours 5/25/2024 6/1/2024    Route: Intravenous    Diclofenac Sodium " (VOLTAREN) 1 % gel 4 g 4 g 4 Times Daily PRN 5/24/2024 --    Admin Instructions: Apply to pain.   Do not cover area with occlusive dressing or apply any other med to affected area. Do not wash affected area for 1 hr after applying. Use dosing card for correct dose measurement.    Route: Topical    DULoxetine (CYMBALTA) DR capsule 30 mg 30 mg Daily 5/24/2024 --    Admin Instructions: Do not crush or chew the capsules or tablets. The drug may not work as designed if the capsule or tablet is crushed or chewed. Swallow whole.  Caution: Look alike/sound alike drug alert. Capsule may be opened and sprinkled on applesauce or apple juice. Do not crush or chew capsule.    Route: Oral    famotidine (PEPCID) injection 20 mg 20 mg 2 Times Daily 5/24/2024 --    Admin Instructions: Give IV push over 2 minutes.    Route: Intravenous    ferrous sulfate tablet 325 mg 325 mg Daily With Breakfast 5/25/2024 --    Admin Instructions: Swallow whole. Do not crush, split, or chew. Take with food if GI upset occurs.    Route: Oral    First Mouthwash (Magic Mouthwash) 5 mL 5 mL Every 6 Hours 5/24/2024 --    Admin Instructions: Shake Well Before Each Use. Stable 180 days at room temp after mixing.    Route: Swish & Spit    folic acid (FOLVITE) tablet 1,000 mcg 1,000 mcg Daily 5/24/2024 --    Route: Oral    isosorbide mononitrate (IMDUR) 24 hr tablet 60 mg 60 mg Daily 5/24/2024 --    Admin Instructions: Do not crush or chew the capsules or tablets. The drug may not work as designed if the capsule or tablet is crushed or chewed. Swallow whole.    Route: Oral    leflunomide (ARAVA) tablet 20 mg 20 mg Daily 5/24/2024 --    Admin Instructions: Group 1 (Yellow) Hazardous Drug - See Handling Guide    Route: Oral    levothyroxine (SYNTHROID, LEVOTHROID) tablet 75 mcg 75 mcg Every Early Morning 5/24/2024 --    Admin Instructions: Take on empty stomach.    Route: Oral    Lidocaine 4 % 2 patch 2 patch Every 24 Hours Scheduled 5/24/2024 --    Admin  "Instructions: If given for pain, use the following pain scale:  Mild Pain = Pain Score of 1-3, CPOT 1-2  Moderate Pain = Pain Score of 4-6, CPOT 3-4  Severe Pain = Pain Score of 7-10, CPOT 5-8    Route: Transdermal    losartan (COZAAR) tablet 50 mg 50 mg Daily 5/24/2024 --    Admin Instructions: Hold for SBP less than 100, DBP less than 60.    Route: Oral    magnesium hydroxide (MILK OF MAGNESIA) 400 MG/5ML suspension 30 mL 30 mL Daily PRN 5/24/2024 --    Route: Oral    multivitamin with minerals 1 tablet 1 tablet Daily 5/24/2024 --    Admin Instructions:     Route: Oral    nitroglycerin (NITROSTAT) SL tablet 0.4 mg 0.4 mg Every 5 Minutes PRN 5/24/2024 --    Admin Instructions: Sublingual. Do not chew, crush, or swallow. Place under tongue and allow to dissolve. May take a small sip of water prior to placing under the tongue to aid dissolution.  May administer up to 3 doses per episode.    Route: Sublingual    ondansetron (ZOFRAN) injection 4 mg 4 mg Every 6 Hours PRN 5/24/2024 --    Admin Instructions: \"If multiple N/V medications ordered, use in the following order: Ondansetron, Prochlorperazine, Promethazine. Use PO unless patient refuses or patient unable to swallow.\"    Route: Intravenous    polyethylene glycol (MIRALAX) packet 17 g 17 g Daily PRN 5/24/2024 --    Admin Instructions: Use if no bowel movement after 12 hours. Mix in 6-8 ounces of water.  Use 4-8 ounces of water, tea, or juice for each 17 gram dose.    Route: Oral    Linked Group 1: Placed in \"And\" Linked Group        predniSONE (DELTASONE) tablet 5 mg 5 mg Daily 5/24/2024 --    Admin Instructions: Take with food.    Route: Oral    sennosides-docusate (PERICOLACE) 8.6-50 MG per tablet 2 tablet 2 tablet 2 Times Daily PRN 5/24/2024 --    Admin Instructions: Start bowel management regimen if patient has not had a bowel movement after 12 hours.    Route: Oral    Linked Group 1: Placed in \"And\" Linked Group        sodium chloride 0.9 % flush 10 mL 10 mL " As Needed 5/24/2024 --    Route: Intravenous    sodium chloride 0.9 % flush 10 mL 10 mL Every 12 Hours Scheduled 5/24/2024 --    Route: Intravenous    sodium chloride 0.9 % flush 10 mL 10 mL As Needed 5/24/2024 --    Route: Intravenous    sodium chloride 0.9 % infusion 40 mL 40 mL As Needed 5/24/2024 --    Admin Instructions: Following administration of an IV intermittent medication, flush line with 40mL NS at 100mL/hr.    Route: Intravenous    traMADol (ULTRAM) tablet 50 mg 50 mg Every 12 Hours PRN 5/24/2024 --    Admin Instructions: Based on patient request - if ordered for moderate or severe pain, provider allows for administration of a medication prescribed for a lower pain scale.      Caution: Look alike/sound alike drug alert    If given for pain, use the following pain scale:  Mild Pain = Pain Score of 1-3, CPOT 1-2  Moderate Pain = Pain Score of 4-6, CPOT 3-4  Severe Pain = Pain Score of 7-10, CPOT 5-8    Route: Oral    valACYclovir (VALTREX) tablet 500 mg 500 mg Daily PRN 5/24/2024 5/31/2024    Admin Instructions: Caution: Look alike/sound alike drug alert    Route: Oral            Social History     Tobacco Use    Smoking status: Never     Passive exposure: Never    Smokeless tobacco: Never   Substance Use Topics    Alcohol use: No        Past Family History:  Family History   Problem Relation Age of Onset    Cancer Mother         Lung cancer    Heart disease Father         Doied of heart. Attack       Review of Systems:  Constitutional: No unexpected weight change, + fatigue, no unexplained fever, no sweats or chills.   HEENT: No icteric sclera.  No hearing or visual deficits.  No sore throat.  No chronic nasal discharge.  Pulmonary: No chronic cough.  No hemoptysis.  + shortness of breath.  Cardiovascular: No chest pain.  No palpitations.  + shortness of breath.  Gastrointestinal: As above.  Musculoskeletal/extremities: + peripheral edema.  No cyanosis.  No claudications.  No back pain.  Genitourinary:  No dysuria.  No blood in stool.  No urethral discharges.  Neurologic: No seizures.  No headaches.  No dizziness.  No gait problems.  Skin: No rash.  No icterus.  Mental: No psychosis.  No confusions.  No hallucinations.      Physical Exam:  Temp:  [98 °F (36.7 °C)-98.3 °F (36.8 °C)] 98 °F (36.7 °C)  Heart Rate:  [86-93] 87  Resp:  [16-23] 16  BP: (143-169)/(61-94) 147/73  Body mass index is 25.82 kg/m².    Intake/Output Summary (Last 24 hours) at 5/24/2024 1828  Last data filed at 5/24/2024 1655  Gross per 24 hour   Intake --   Output 1900 ml   Net -1900 ml     I/O this shift:  In: -   Out: 1900 [Urine:1900]    General appearance: 68-year-old obese woman in no acute distress.  Awake, alert and oriented x 3.   HEENT: Nonicteric sclerae.  Moist oral mucosa.  PERRLA.  EOMI.    Lungs: Somewhat diminished breath sounds at bases bilaterally.  No wheezing, rales or rhonchi.  Heart: Regular rate and rhythm.  Distant S1 and S2, no S3, S4 or murmur.  Abdomen: Obese, soft, nondistended, nontender to palpation, with normoactive bowel sounds, no hepatosplenomegaly, no palpable masses.  Extremities: No cyanosis, edema or pulse deficits.  Skin: No rash or jaundice.    Results Review:  Lab Results (last 24 hours)       Procedure Component Value Units Date/Time    Urinalysis With Culture If Indicated - Urine, Clean Catch [[id number]]  (Abnormal) Collected: 05/24/24 1312    Specimen: Urine, Clean Catch Updated: 05/24/24 1343     Color, UA Yellow     Appearance, UA Cloudy     pH, UA 6.5     Specific Gravity, UA 1.008     Glucose, UA Negative     Ketones, UA Negative     Bilirubin, UA Negative     Blood, UA Moderate (2+)     Protein, UA Trace     Leuk Esterase, UA Large (3+)     Nitrite, UA Positive     Urobilinogen, UA 0.2 E.U./dL    Narrative:      In absence of clinical symptoms, the presence of pyuria, bacteria, and/or nitrites on the urinalysis result does not correlate with infection.    Urinalysis, Microscopic Only - Urine, Clean  "Catch [[id number]]  (Abnormal) Collected: 05/24/24 1312    Specimen: Urine, Clean Catch Updated: 05/24/24 1343     RBC, UA 0-2 /HPF      WBC, UA Too Numerous to Count /HPF      Bacteria, UA 2+ /HPF      Squamous Epithelial Cells, UA None Seen /HPF      Hyaline Casts, UA 0-2 /LPF      Methodology Manual Light Microscopy    Urine Culture - Urine, Urine, Clean Catch [[id number]] Collected: 05/24/24 1312    Specimen: Urine, Clean Catch Updated: 05/24/24 1343    D-dimer, Quantitative [[id number]]  (Abnormal) Collected: 05/24/24 1051    Specimen: Blood Updated: 05/24/24 1223     D-Dimer, Quantitative 2.40 MCGFEU/mL     Narrative:      According to the assay 's published package insert, a normal (<0.50 MCGFEU/mL) D-dimer result in conjunction with a non-high clinical probability assessment, excludes deep vein thrombosis (DVT) and pulmonary embolism (PE) with high sensitivity.    D-dimer values increase with age and this can make VTE exclusion of an older population difficult. To address this, the [employer], based on best available evidence and recent guidelines, recommends that clinicians use age-adjusted D-dimer thresholds in patients greater than 50 years of age with: a) a low probability of PE who do not meet all Pulmonary Embolism Rule Out Criteria, or b) in those with intermediate probability of PE.   The formula for an age-adjusted D-dimer cut-off is \"age/100\".  For example, a 60 year old patient would have an age-adjusted cut-off of 0.60 MCGFEU/mL and an 80 year old 0.80 MCGFEU/mL.    COVID-19 and FLU A/B PCR, 1 HR TAT - Swab, Nasopharynx [[id number]]  (Normal) Collected: 05/24/24 1106    Specimen: Swab from Nasopharynx Updated: 05/24/24 1136     COVID19 Not Detected     Influenza A PCR Not Detected     Influenza B PCR Not Detected    Narrative:      Fact sheet for providers: https://www.fda.gov/media/769056/download    Fact sheet for patients: " https://www.fda.gov/media/052208/download    Test performed by PCR.    Blood Culture - Blood, Arm, Right [[id number]] Collected: 05/24/24 1110    Specimen: Blood from Arm, Right Updated: 05/24/24 1131    Comprehensive Metabolic Panel [[id number]]  (Abnormal) Collected: 05/24/24 1051    Specimen: Blood Updated: 05/24/24 1125     Glucose 105 mg/dL      BUN 15 mg/dL      Creatinine 1.08 mg/dL      Sodium 136 mmol/L      Potassium 3.6 mmol/L      Chloride 103 mmol/L      CO2 23.0 mmol/L      Calcium 8.6 mg/dL      Total Protein 6.0 g/dL      Albumin 2.9 g/dL      ALT (SGPT) 30 U/L      AST (SGOT) 39 U/L      Alkaline Phosphatase 66 U/L      Total Bilirubin 0.7 mg/dL      Globulin 3.1 gm/dL      A/G Ratio 0.9 g/dL      BUN/Creatinine Ratio 13.9     Anion Gap 10.0 mmol/L      eGFR 55.4 mL/min/1.73     Narrative:      GFR Normal >60  Chronic Kidney Disease <60  Kidney Failure <15      Magnesium [[id number]]  (Abnormal) Collected: 05/24/24 1051    Specimen: Blood Updated: 05/24/24 1124     Magnesium 1.5 mg/dL     Lactic Acid, Plasma [[id number]]  (Normal) Collected: 05/24/24 1058    Specimen: Blood Updated: 05/24/24 1123     Lactate 0.9 mmol/L     BNP [[id number]]  (Abnormal) Collected: 05/24/24 1051    Specimen: Blood Updated: 05/24/24 1120     proBNP 5,126.0 pg/mL     Narrative:      This assay is used as an aid in the diagnosis of individuals suspected of having heart failure. It can be used as an aid in the diagnosis of acute decompensated heart failure (ADHF) in patients presenting with signs and symptoms of ADHF to the emergency department (ED). In addition, NT-proBNP of <300 pg/mL indicates ADHF is not likely.    Age Range Result Interpretation  NT-proBNP Concentration (pg/mL:      <50             Positive            >450                   Gray                 300-450                    Negative             <300    50-75           Positive            >900                  Gray                300-900                   Negative            <300      >75             Positive            >1800                  Gray                300-1800                  Negative            <300    [place] Draw [[id number]] Collected: 05/24/24 1051    Specimen: Blood Updated: 05/24/24 1117    Narrative:      The following orders were created for panel order [place] Draw.  Procedure                               Abnormality         Status                     ---------                               -----------         ------                     Green Top (Gel)[[id number]]                                  Final result               Lavender Top[[id number]]                                     Final result               Red Top[[id number]]                                          Final result               [place] Blood Culture Jacob...[[id number]]                      Final result               Gray Top[[id number]]                                         Final result               Light Blue Top[[id number]]                                   Final result                 Please view results for these tests on the individual orders.    [place] Blood Culture Bottle Set [[id number]] Collected: 05/24/24 1051    Specimen: Blood from Hand, Digit Right Updated: 05/24/24 1117     Extra Tube Hold for add-ons.     Comment: Auto resulted.       Velarde Top [[id number]] Collected: 05/24/24 1058    Specimen: Blood Updated: 05/24/24 1117     Extra Tube Hold for add-ons.     Comment: Auto resulted.       Blood Culture - Blood, Hand, Digit Right [[id number]] Collected: 05/24/24 1051    Specimen: Blood from Hand, Digit Right Updated: 05/24/24 1111    CBC & Differential [[id number]]  (Abnormal) Collected: 05/24/24 1051    Specimen: Blood Updated: 05/24/24 1105    Narrative:      The following orders were created for panel order CBC & Differential.  Procedure                               Abnormality         Status                     ---------                               -----------          ------                     CBC Auto Differential[[id number]]        Abnormal            Final result                 Please view results for these tests on the individual orders.    CBC Auto Differential [[id number]]  (Abnormal) Collected: 05/24/24 1051    Specimen: Blood Updated: 05/24/24 1105     WBC 6.65 10*3/mm3      RBC 2.82 10*6/mm3      Hemoglobin 8.2 g/dL      Hematocrit 26.7 %      MCV 94.7 fL      MCH 29.1 pg      MCHC 30.7 g/dL      RDW 17.7 %      RDW-SD 60.5 fl      MPV 11.4 fL      Platelets 115 10*3/mm3      Neutrophil % 66.3 %      Lymphocyte % 20.3 %      Monocyte % 11.3 %      Eosinophil % 0.2 %      Basophil % 0.5 %      Immature Grans % 1.4 %      Neutrophils, Absolute 4.42 10*3/mm3      Lymphocytes, Absolute 1.35 10*3/mm3      Monocytes, Absolute 0.75 10*3/mm3      Eosinophils, Absolute 0.01 10*3/mm3      Basophils, Absolute 0.03 10*3/mm3      Immature Grans, Absolute 0.09 10*3/mm3      nRBC 0.5 /100 WBC     Green Top (Gel) [[id number]] Collected: 05/24/24 1051    Specimen: Blood Updated: 05/24/24 1100     Extra Tube Hold for add-ons.     Comment: Auto resulted.       Lavender Top [[id number]] Collected: 05/24/24 1051    Specimen: Blood Updated: 05/24/24 1100     Extra Tube hold for add-on     Comment: Auto resulted       Red Top [[id number]] Collected: 05/24/24 1051    Specimen: Blood Updated: 05/24/24 1100     Extra Tube Hold for add-ons.     Comment: Auto resulted.       Light Blue Top [[id number]] Collected: 05/24/24 1051    Specimen: Blood Updated: 05/24/24 1100     Extra Tube Hold for add-ons.     Comment: Auto resulted               Radiology Review:  Imaging Results (Last 72 Hours)       Procedure Component Value Units Date/Time    US Gallbladder [[id number]] Collected: 05/24/24 1425     Updated: 05/24/24 1429    Narrative:      EXAMINATION: US GALLBLADDER-     5/24/2024 1:01 PM     HISTORY: f/u on CTA chest abnormality; E87.70-Fluid overload,  unspecified; R53.81-Other malaise     Grey-scale  and color flow ultrasound evaluation of the RUQ.     Appropriate size and position of the gallbladder.  9 mm gallstone.  No gallbladder wall thickening.  No biliary dilation.  CBD = 4 mm.     The visualized portion of the right hepatic lobe is normal.     The right kidney is normal, to the extent visualized.     No free fluid at the right upper quadrant.       Impression:      1. Gallstone with no sign of cholecystitis.                    This report was signed and finalized on 5/24/2024 2:26 PM by Dr. Jesus Manuel Santos MD.       CT Angiogram Chest [[id number]] Collected: 05/24/24 1322     Updated: 05/24/24 1332    Narrative:      EXAM: CT ANGIOGRAM CHEST- - 5/24/2024 11:42 AM     HISTORY: eval for PE; E87.70-Fluid overload, unspecified; R53.81-Other  malaise       COMPARISON: No existing relevant imaging studies available.     DOSE LENGTH PRODUCT: 224.56 mGy.cm. Automatic exposure control was  utilized to make radiation dose as low as reasonably achievable.     TECHNIQUE: Enhanced axial CT images obtained with multiplanar reformats.  3D postprocessing, including MIPs, performed and images saved to PACS.     FINDINGS:  AIRWAYS/PULMONARY PARENCHYMA: The central airways are midline and  patent. Expiratory phase of imaging.     Small bilateral pleural fluid. Small layering groundglass opacity at the  fissures, which could represent small pulmonary edema/fluid or  atelectasis. No suspicious pulmonary mass or nodule.      VESSELS: Contrast bolus is adequate. No pulmonary artery filling defect  to suggest pulmonary embolus. Aorta normal in course and caliber with  calcified atherosclerosis.     CARDIAC: Cardiomegaly. Heavily calcified coronary arteries verses stent  material. No pericardial effusion.       MEDIASTINUM: There is no mediastinal or hilar lymphadenopathy by CT size  criteria.     The mid and distal esophagus appears diffusely thickened measuring 2.5  cm in cross-section.     EXTRATHORACIC: The visualized portions  of the thyroid gland are  unremarkable. No thoracic inlet or axillary adenopathy. Cardiac pulse  generator in the LEFT chest wall. Otherwise overlying soft tissues with  mild stranding, suggest body wall edema.     INCLUDED UPPER ABDOMEN: 7 mm gallstone at the proximal gallbladder neck  or cystic duct. Partially visualized gallbladder appears distended, not  optimally evaluated on this exam. Visualized portion of the liver,  pancreas, spleen, adrenal glands appear within normal limits.     OSSEOUS: Kyphoplasty changes at T12. Mild height loss of T10 appears  similar to 8/22/2023.          Impression:      1. Small bilateral pleural fluid and small layering groundglass opacity  at the fissures, could represent small pulmonary edema/fluid or  atelectasis.  2. Gallstone at the proximal gallbladder neck or cystic duct. Partially  visualized gallbladder appears distended, not optimally evaluated on  this exam. Correlate with patient symptoms and consider dedicated  ultrasound.  3. Diffuse esophageal thickening of the mid and distal esophagus. This  is new compared to 4/22/2022. Recommend referral to gastroenterology for  consideration of direct visualization.     This report was signed and finalized on 5/24/2024 1:29 PM by Dr Paulina Tavares MD.       XR Chest 1 View [[id number]] Collected: 05/24/24 1143     Updated: 05/24/24 1147    Narrative:      XR CHEST 1 VW-     HISTORY: shortness of breath     COMPARISON: 5/2/2024     FINDINGS: Frontal view the chest obtained.     Diminished level of inspiration. Similar positioning of the dual-lead  left subclavian cardiac pacer leads. Stable granulomatous calcifications  right hemithorax. Similar cardiomegaly. Borderline prominent pulmonary  vascular structures may relate to vascular crowding versus mild venous  congestion. No lobar consolidation. No pleural effusion or pneumothorax.  Postoperative changes cervical spine. Arthritic changes of the  shoulders.       Impression:       1. Low lung volumes. Stable cardiomegaly with similar positioning of the  left subclavian cardiac pacer leads. Vascular crowding versus mild  venous congestion. No consolidation.        This report was signed and finalized on 5/24/2024 11:44 AM by Dr. Trinity Gómez MD.               Impression/Plan:    Fluid overload    T12 compression fracture, initial encounter    Chronic embolism and thrombosis of unspecified deep veins of left lower extremity    Chronic anticoagulation    Iron deficiency anemia    Osteoporosis    Overweight (BMI 25.0-29.9)    Rheumatoid arthritis involving multiple sites    (HFpEF) heart failure with preserved ejection fraction    Venous insufficiency    Coronary artery disease involving native coronary artery of native heart without angina pectoris    Sick sinus syndrome    Essential hypertension    Cholelithiasis    Paralysis    History of urinary retention    Hypothyroidism    68-year-old woman admitted with complaints of progressive fatigue, weakness, shortness of breath in the setting of chronic normocytic anemia.  CT angiography revealed gallstone in the gallbladder and thickening of the mid and distal esophagus.  Subsequently ultrasound revealed solitary 9 mm stone in the gallbladder but without signs of cholecystitis.  LFTs were quite unremarkable with exception of minimally elevated AST and low albumin of 2.9.  Patient did not experience any symptoms of cholecystitis or biliary colic, specifically no pain, nausea or vomiting.  Therefore no additional workup for cholelithiasis is required and patient will need cholecystectomy only if her cholelithiasis become symptomatic.  Patient also has been experiencing progressive dysphagia for solids and liquids over the last couple of month.  Abnormal appearance of the mid and distal esophagus on CT and recent history of dysphagia obviously requires endoscopic evaluation, and I discussed with the patient risks and benefits associated with an  upper endoscopy.  It would be reasonable to proceed with an upper endoscopy next week once patient's respiratory status improves.  Patient is also overdue for screening colonoscopy, which can be arranged on outpatient basis.       Tad Jones MD  05/24/24   18:28 CDT

## 2024-05-24 NOTE — PROGRESS NOTES
"Pharmacy Dosing Service  Antimicrobial Dosing  Cefepime    Assessment/Action/Plan:  Based on indication and renal function, Cefepime dose adjusted to 2 gm IV every 12 hours via extended infusion. Pharmacy will continue to monitor daily and make further adjustment(s) accordingly.     Subjective:  Tawny Shin is a 68 y.o. female initiated on Cefepime 1 gm IV every 12 hours, for the treatment of UTI by prescriber, day 1 of 7 of treatment.    Objective:  Ht: 167.6 cm (66\"); Wt: 72.6 kg (160 lb)  Estimated Creatinine Clearance: 49.4 mL/min (A) (by C-G formula based on SCr of 1.08 mg/dL (H)).   Creatinine   Date Value Ref Range Status   05/24/2024 1.08 (H) 0.57 - 1.00 mg/dL Final   05/16/2024 1.16 (H) 0.57 - 1.00 mg/dL Final   05/09/2024 0.96 0.57 - 1.00 mg/dL Final   05/08/2024 0.92 0.57 - 1.00 mg/dL Final     Lab Results   Component Value Date    WBC 6.65 05/24/2024    WBC 6.35 05/16/2024    WBC 5.88 05/09/2024      Baseline culture results:  Microbiology Results (last 10 days)       Procedure Component Value - Date/Time    COVID-19 and FLU A/B PCR, 1 HR TAT - Swab, Nasopharynx [[id number]]  (Normal) Collected: 05/24/24 1106    Lab Status: Final result Specimen: Swab from Nasopharynx Updated: 05/24/24 1136     COVID19 Not Detected     Influenza A PCR Not Detected     Influenza B PCR Not Detected    Narrative:      Fact sheet for providers: https://www.fda.gov/media/414368/download    Fact sheet for patients: https://www.fda.gov/media/300026/download    Test performed by PCR.            MARKIE Mobley PharmD  05/24/24 14:21 CDT    "

## 2024-05-24 NOTE — TELEPHONE ENCOUNTER
Message sent to [facility]    [3:44 PM] Viviana Diaz ([facility])  Consult:   Tawny Shin 1955   Jackie @ [facility] [phone] called in consult. Dr Lilian MD is consulting for recurrent UTI. Patient is in room 362. You last saw patient in office 2/7/2024 for Osteomyelitis right hip.

## 2024-05-24 NOTE — ED PROVIDER NOTES
Subjective   History of Present Illness  Patient presents due to generalized weakness.  Present for 2 weeks.  She also complains of gradual swelling in her legs, and shortness of breath on review of systems.  Shortness of breath is worse lying flat, better sitting up.  She states it was sudden onset, says she was not doing a thing in particular 2 weeks ago when it started.  Her weakness has gradually progressed she is having trouble with mobility and feels weak all over.  Denies focal pain.  No abdominal pain chest pain vomiting passing out vision changes headaches fevers.  Urinating twice a day, defecating twice a day.  Took Lasix up until the ninth of this month when it was stopped.  She has no unilateral weakness, weakness is not worse in any area compared to any other area.  No numbness or tingling.  Her most recent fall she says was 3 weeks ago, she was not injured in the fall, no falls more recent than this or areas of pain from a fall.  History of DVT, denies taking a blood thinner.  No fevers.  Was recently treated for UTI, presented with generalized weakness.  She has strong smelling urine which is chronic in nature.  She says that she is not currently taking antibiotics.  Her recent urine cultures and blood cx from that hospitalization shows CRE.    Review of Systems   Constitutional:  Negative for chills and fever.   Respiratory:  Negative for cough and shortness of breath.    Cardiovascular:  Positive for leg swelling. Negative for chest pain.   Gastrointestinal:  Negative for abdominal pain and vomiting.   Genitourinary:  Positive for decreased urine volume. Negative for difficulty urinating and dysuria.   Neurological:  Positive for weakness (generalized). Negative for syncope and light-headedness.       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    Elevated cholesterol     Eustachian tube dysfunction     Heart disease     Herpes simplex     History of transfusion     Hyperlipidemia     Hypertension     Hypothyroidism 12/23/2020    Intrinsic asthma 12/23/2020    Knee dislocation     Labral tear of right hip joint     Laryngitis sicca     Laryngitis, chronic     Left carotid bruit 03/09/2016    MI (myocardial infarction)     Myalgia due to statin 06/25/2019    Open wound of right hip 09/14/2021    Osteomyelitis of right femur 07/06/2021    Otorrhea     Pacemaker 11/17/2016    Primary osteoarthritis of left knee 12/23/2020    Psoriasis vulgaris 12/23/2020    S/P coronary artery stent placement 03/09/2016    Sensorineural hearing loss     Seropositive rheumatoid arthritis of multiple sites 12/27/2019    Overview:  -myochrysine '93-'96 -methotrexate '96--->11/98;r/s  restarted 2/99--> 8/14 (anemia) -sulfasalazine- not effective -penicillamine 6/98-->10/98; no effect -leflunomide 11/98--> - Humira '13-->didn't take - Enbrel 12/14-->3/15- no effect!   Last Assessment & Plan:  - \"aching all over\" because she had to be off her anti-rheumatic drugs for 2 weeks in preparation for her R knee surgery - he    Sick sinus syndrome 12/27/2019    Sjogren's disease     Spondylolisthesis of lumbar region 01/17/2018    Syncope, recurrent 02/08/2021    Urinary tract infection     UTI (urinary tract infection) 04/19/2024       Allergies   Allergen Reactions    Atorvastatin Other (See Comments)     LEG CRAMPS      Amoxicillin Rash    Escitalopram Rash    Nabumetone Rash    Niacin Er Rash    Penicillin G Rash    " Penicillins Rash    Simvastatin Rash       Past Surgical History:   Procedure Laterality Date    A-V CARDIAC PACEMAKER INSERTION  2016    ATRIAL CARDIAC PACEMAKER INSERTION      CARDIAC CATHETERIZATION      CATARACT EXTRACTION      CERVICAL CORPECTOMY N/A 3/3/2021    Procedure: CERVICAL 6 CORPECTOMY WITH TITANIUM CAGE WITH NEURO MONITORING;  Surgeon: Bandar Shea MD;  Location:  [facility];  Service: Neurosurgery;  Laterality: N/A;    COLONOSCOPY  11/08/2011    One fold in the ascending colon which showed ulcer otherwise normal exam    COLONOSCOPY  11/12/2004    Normal exam repeat in five years    CORONARY ANGIOPLASTY WITH STENT PLACEMENT      X 2; 2013 & 2014    ENDOSCOPY  07/10/2014    Normal exam    FLAP LEG Right 9/14/2021    Procedure: RIGHT GLUTEAL FASCIOCUTANEOUS ADVANCEMENT FLAP AND RIGHT TENSOR FASCIAL JESSICA FLAP;  Surgeon: Amadeo Turner MD;  Location:  [facility];  Service: Plastics;  Laterality: Right;    HIP ABDUCTION TENOTOMY BILATERAL Right 1/14/2021    Procedure: RIGHT HIP GLUTEUS MEDLUS / MINIMUS REPAIR, POSSIBLE ACHILLES ALLOGRAFT;  Surgeon: Nino Carlson MD;  Location:  [facility];  Service: Orthopedics;  Laterality: Right;    INCISION AND DRAINAGE ABSCESS Right 6/4/2022    Procedure: INCISION AND DRAINAGE ABSCESS right hip;  Surgeon: Magda Salcido MD;  Location:  [facility];  Service: General;  Laterality: Right;    INCISION AND DRAINAGE ABSCESS Right 6/10/2022    Procedure: RIGHT HIP INCISION AND DRAINAGE. MD NEEDS 3L VANC IRRIGATION, CURRETTES, DAICANS, KERLEX ROLLS;  Surgeon: Amadeo Turner MD;  Location:  [facility];  Service: Plastics;  Laterality: Right;    INCISION AND DRAINAGE HIP Right 2/9/2021    Procedure: HIP INCISION AND DRAINAGE;  Surgeon: Nino Carlson MD;  Location:  [facility];  Service: Orthopedics;  Laterality: Right;    INCISION AND DRAINAGE LEG Right 10/24/2021    Procedure: INCISION AND DRAINAGE LOWER EXTREMITY;  Surgeon: Amadeo Turner MD;  Location:  [facility];   Service: Plastics;  Laterality: Right;    INCISION AND DRAINAGE OF WOUND Right 7/8/2021    Procedure: INCISION AND DRAINAGE WOUND RIGHT HIP;  Surgeon: James Huntley MD;  Location:  [facility];  Service: Orthopedics;  Laterality: Right;    JOINT REPLACEMENT      KYPHOPLASTY WITH BIOPSY Bilateral 10/26/2021    Procedure: THOARCIC 12 KYPHOPLASTY WITH BIOPSY;  Surgeon: Bandar Shea MD;  Location:  [facility];  Service: Neurosurgery;  Laterality: Bilateral;    LEG DEBRIDEMENT Right 9/14/2021    Procedure: DEBRIDEMENT OF RIGHT HIP WOUND, RIGHT GLUTEAL FASCIOCUTANEOUS ADVANCEMENT FLAP AND RIGHT TENSOR FASCIAL JESSICA FLAP;  Surgeon: Amadeo Turner MD;  Location:  [facility];  Service: Plastics;  Laterality: Right;    LUMBAR DISCECTOMY Right 3/23/2021    Procedure: LUMBAR DISCECTOMY MICRO, Lumbar 1/2 right;  Surgeon: Bandar Shea MD;  Location:  [facility];  Service: Neurosurgery;  Laterality: Right;    LUMBAR FUSION N/A 1/19/2018    Procedure: L3-4,L4-5 DECOMPRESSION, POSTERIOR SPINAL FUSION WITH INSTRUMENTATION;  Surgeon: Fortino Oropeza MD;  Location:  [facility];  Service:     LUMBAR LAMINECTOMY WITH FUSION Left 1/17/2018    Procedure: LEFT L3-4 L4-5 LATERAL LUMBAR INTERBODY FUSION;  Surgeon: Fortino Oropeza MD;  Location:  [facility];  Service:     MASS EXCISION Right 4/23/2024    Procedure: RIGHT BUTTOCK MASS EXCISION;  Surgeon: Moises Keyes MD;  Location:  [facility];  Service: General;  Laterality: Right;    MYRINGOTOMY W/ TUBES  09/04/2014    TUBES NO LONGER IN PLACE    OTHER SURGICAL HISTORY      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 so hardware was removed and spacers were placed    REPLACEMENT TOTAL KNEE Right        Family History   Problem Relation Age of Onset    Cancer Mother         Lung cancer    Heart disease Father         Doied of heart. Attack       Social History     Socioeconomic History    Marital status:    Tobacco Use    Smoking status: Never     Passive exposure: Never     Smokeless tobacco: Never   Vaping Use    Vaping status: Never Used   Substance and Sexual Activity    Alcohol use: No    Drug use: Never    Sexual activity: Defer     Birth control/protection: Abstinence           Objective   Physical Exam  Vitals reviewed.   Constitutional:       General: She is not in acute distress.  HENT:      Head: Normocephalic and atraumatic.   Eyes:      Extraocular Movements: Extraocular movements intact.      Conjunctiva/sclera: Conjunctivae normal.   Cardiovascular:      Pulses: Normal pulses.      Heart sounds: Normal heart sounds.   Pulmonary:      Effort: Pulmonary effort is normal. No respiratory distress.   Abdominal:      General: Abdomen is flat. There is no distension.      Palpations: Abdomen is soft.      Tenderness: There is no abdominal tenderness. There is no guarding.   Musculoskeletal:      Cervical back: Normal range of motion and neck supple.      Right lower leg: Edema present.      Left lower leg: Edema present.   Skin:     General: Skin is warm and dry.   Neurological:      General: No focal deficit present.      Mental Status: She is alert. Mental status is at baseline.      Comments: Right upper extremity: 5/5 strength with handgrip and flexion/extension of shoulders, elbows.   Light touch sensation intact and equal when compared to the left upper extremity.    Left upper extremity: 5/5 strength with handgrip and flexion/extension of shoulders, elbows.   Light touch sensation intact and equal when compared to the right upper extremity.    Right lower extremity: antigravity, no focal deficit.   Light touch sensation intact and equal when compared to the left lower extremity.    Left lower extremity: antigravity, no focal deficit.   Light touch sensation intact and equal when compared to the right lower extremity.    Light sensation intact in bilateral face. CN 2-12 normal.    Psychiatric:         Behavior: Behavior normal.         Thought Content: Thought content  normal.         Procedures           ED Course  ED Course as of 05/24/24 1445   Fri May 24, 2024   1040 EKG: sinus rhythm, no focal ischemic changes, no arrhythmia. [AS]   1103 SIRS 2/4. Will conduct infectious analysis. WBC pending. Not febrile. [AS]   1134 BNP elevated. No leukocytosis, lactic acid is normal. Will order diuresis. [AS]   1200 No leukocytosis normal lactic acid. Urine pending. CXR looks overloaded. Will reassess pt afte rlasix. [AS]   1230 Asked pt if she would like me to call her son Casa she declined. [AS]   1232 Dimer elevated wll proceed with CTA pt informed [AS]   1335 Will order GBUS; pt without any abdominal pain tenderness or fever, think GB related CTA results are unlikely to be cholecystitis [AS]   1345 Discussed H&P with Dr. Ambriz including urine results; he recs consult to ID and he will come evalutae, did not recommend abx for now. [AS]      ED Course User Index  [AS] Joshua Giordano MD                                             Medical Decision Making  Problems Addressed:  Debility: complicated acute illness or injury  Hypervolemia, unspecified hypervolemia type: complicated acute illness or injury    Amount and/or Complexity of Data Reviewed  Labs: ordered.  Radiology: ordered.  ECG/medicine tests: ordered.    Risk  Prescription drug management.  Decision regarding hospitalization.      Tawny Shin is a 68 y.o. female with PMH above who presents to the Emergency Department with generalized weakness.  Only focal urinary symptoms strong smelling urine, however when she presented previously in May with bacteremia she just had generalized weakness at that time.  She has SIRS 2 out of 4 with tachypnea and elevated heart rate.  Her most likely explanatory causes fluid overload from being off her diuretics.  Differential diagnosis of bacteremia or UTI must be considered and testing is ordered to evaluate for this further.  Additionally she has a history of DVT, denies being  on a DOAC, endorses sudden onset shortness of breath, and while otherwise her history and physical in the absence of chest pain and gradual swelling is more consistent with fluid overload than DVT or PE, this must be considered. Well's score 3; dimer is ordered.       ED Course:   -See ED course. Ultimately got permission to updat the son. Stable. US GB pending at time of admission to Dr. Brito.      Final diagnosis: fluid overload    All questions answered. Patient/family was understanding and in agreement with today's assessment and plan. The patient was monitored during their stay in the ED and dispositioned without acute event.    Electronically signed by:  Joshua Giordano MD 5/24/2024 14:45 CDT      Note: Dragon medical dictation software was used in the creation of this note.      Final diagnoses:   Hypervolemia, unspecified hypervolemia type   Debility       ED Disposition  ED Disposition       ED Disposition   Decision to Admit    Condition   --    Comment   Level of Care: Med/Surg [1]   Diagnosis: Fluid overload [[id number]]   Admitting Physician: MARBELLA BRITO [[id number]]   Attending Physician: MARBELLA BRITO [[id number]]   Certification: I Certify That Inpatient Hospital Services Are Medically Necessary For Greater Than 2 Midnights                 No follow-up provider specified.       Medication List      No changes were made to your prescriptions during this visit.            Joshua Giordano MD  05/24/24 0726

## 2024-05-24 NOTE — H&P
[facility] Medicine Services  HISTORY AND PHYSICAL    Date of Admission: 5/24/2024  Primary Care Physician: Moises Oliveira MD    Subjective   Primary Historian: Patient.    Chief Complaint: Shortness of breath/weakness.    History of Present Illness  Patient is a 68 years old presented ER with complaint generalized weakness for last 2 weeks.  Complains of swollen legs and shortness of breath.  Patient required 2 L of oxygen in ER.  Patient stated breathing is worse when laying flat.  Patient denies any abdomen pain or chest pain.  Patient denies nausea vomiting or diarrhea symptoms.  Patient denies any fever night sweats chills .    Blood pressure slightly high, afebrile.  Patient is on 2 L nasal cannula.  BNP 5000.  Creatinine 1.08.  D-dimer is 2.4.  CT shows no DVT.  Platelet counts 115.  Hemoglobin 8.2.  UA positive for 2+ bacteria.    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 Negative for chills and fever.   HENT:  Negative for hearing loss, nosebleeds, tinnitus and trouble swallowing.    Eyes:  Negative  for visual disturbance.   Respiratory:  Negative for cough, chest tightness, shortness of breath and wheezing.    Cardiovascular:  Negative for chest pain, palpitations and leg swelling.   Gastrointestinal:  Negative for abdominal distention, abdominal pain, blood in stool, constipation, diarrhea, nausea and vomiting.   Endocrine: Negative for cold intolerance, heat intolerance, polydipsia, polyphagia and polyuria.   Genitourinary:  Negative for decreased urine volume, difficulty urinating, dysuria, flank pain, frequency and hematuria.   Musculoskeletal:  Positive for arthralgias, gait problem and myalgias. Negative for joint swelling.   Skin:  Negative for rash.   Allergic/Immunologic: Negative for immunocompromised state.   Neurological:  Positive for weakness. Negative for dizziness, syncope, light-headedness and headaches.   Hematological:  Negative for adenopathy. Does not bruise/bleed easily.   Psychiatric/Behavioral:  Negative for confusion and sleep disturbance. The patient is not nervous/anxious.   Otherwise complete ROS reviewed and negative except as mentioned in the HPI.    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    Elevated cholesterol     Eustachian tube dysfunction     Heart disease     Herpes simplex     History of transfusion     Hyperlipidemia     Hypertension     Hypothyroidism 12/23/2020    Intrinsic asthma 12/23/2020    Knee dislocation     Labral tear  "of right hip joint     Laryngitis sicca     Laryngitis, chronic     Left carotid bruit 03/09/2016    MI (myocardial infarction)     Myalgia due to statin 06/25/2019    Open wound of right hip 09/14/2021    Osteomyelitis of right femur 07/06/2021    Otorrhea     Pacemaker 11/17/2016    Primary osteoarthritis of left knee 12/23/2020    Psoriasis vulgaris 12/23/2020    S/P coronary artery stent placement 03/09/2016    Sensorineural hearing loss     Seropositive rheumatoid arthritis of multiple sites 12/27/2019    Overview:  -myochrysine '93-'96 -methotrexate '96--->11/98;r/s  restarted 2/99--> 8/14 (anemia) -sulfasalazine- not effective -penicillamine 6/98-->10/98; no effect -leflunomide 11/98--> - Humira '13-->didn't take - Enbrel 12/14-->3/15- no effect!   Last Assessment & Plan:  - \"aching all over\" because she had to be off her anti-rheumatic drugs for 2 weeks in preparation for her R knee surgery - he    Sick sinus syndrome 12/27/2019    Sjogren's disease     Spondylolisthesis of lumbar region 01/17/2018    Syncope, recurrent 02/08/2021    Urinary tract infection     UTI (urinary tract infection) 04/19/2024     Past Surgical History:  Past Surgical History:   Procedure Laterality Date    A-V CARDIAC PACEMAKER INSERTION  2016    ATRIAL CARDIAC PACEMAKER INSERTION      CARDIAC CATHETERIZATION      CATARACT EXTRACTION      CERVICAL CORPECTOMY N/A 3/3/2021    Procedure: CERVICAL 6 CORPECTOMY WITH TITANIUM CAGE WITH NEURO MONITORING;  Surgeon: Bandar Shea MD;  Location: [facility];  Service: Neurosurgery;  Laterality: N/A;    COLONOSCOPY  11/08/2011    One fold in the ascending colon which showed ulcer otherwise normal exam    COLONOSCOPY  11/12/2004    Normal exam repeat in five years    CORONARY ANGIOPLASTY WITH STENT PLACEMENT      X 2; 2013 & 2014    ENDOSCOPY  07/10/2014    Normal exam    FLAP LEG Right 9/14/2021    Procedure: RIGHT GLUTEAL FASCIOCUTANEOUS ADVANCEMENT FLAP AND RIGHT TENSOR FASCIAL JESSICA " FLAP;  Surgeon: Amadeo Turner MD;  Location:  [facility];  Service: Plastics;  Laterality: Right;    HIP ABDUCTION TENOTOMY BILATERAL Right 1/14/2021    Procedure: RIGHT HIP GLUTEUS MEDLUS / MINIMUS REPAIR, POSSIBLE ACHILLES ALLOGRAFT;  Surgeon: Nino Carlson MD;  Location:  [facility];  Service: Orthopedics;  Laterality: Right;    INCISION AND DRAINAGE ABSCESS Right 6/4/2022    Procedure: INCISION AND DRAINAGE ABSCESS right hip;  Surgeon: Magda Salcido MD;  Location:  [facility];  Service: General;  Laterality: Right;    INCISION AND DRAINAGE ABSCESS Right 6/10/2022    Procedure: RIGHT HIP INCISION AND DRAINAGE. MD NEEDS 3L VANC IRRIGATION, CURRETTES, DAICANS, KERLEX ROLLS;  Surgeon: Amadeo Turner MD;  Location:  [facility];  Service: Plastics;  Laterality: Right;    INCISION AND DRAINAGE HIP Right 2/9/2021    Procedure: HIP INCISION AND DRAINAGE;  Surgeon: Nino Carlson MD;  Location:  [facility];  Service: Orthopedics;  Laterality: Right;    INCISION AND DRAINAGE LEG Right 10/24/2021    Procedure: INCISION AND DRAINAGE LOWER EXTREMITY;  Surgeon: Amadeo Turner MD;  Location:  [facility];  Service: Plastics;  Laterality: Right;    INCISION AND DRAINAGE OF WOUND Right 7/8/2021    Procedure: INCISION AND DRAINAGE WOUND RIGHT HIP;  Surgeon: James Huntley MD;  Location:  [facility];  Service: Orthopedics;  Laterality: Right;    JOINT REPLACEMENT      KYPHOPLASTY WITH BIOPSY Bilateral 10/26/2021    Procedure: THOARCIC 12 KYPHOPLASTY WITH BIOPSY;  Surgeon: Bandar Shea MD;  Location:  [facility];  Service: Neurosurgery;  Laterality: Bilateral;    LEG DEBRIDEMENT Right 9/14/2021    Procedure: DEBRIDEMENT OF RIGHT HIP WOUND, RIGHT GLUTEAL FASCIOCUTANEOUS ADVANCEMENT FLAP AND RIGHT TENSOR FASCIAL JESSICA FLAP;  Surgeon: Amadeo Turner MD;  Location:  [facility];  Service: Plastics;  Laterality: Right;    LUMBAR DISCECTOMY Right 3/23/2021    Procedure: LUMBAR DISCECTOMY MICRO, Lumbar 1/2 right;  Surgeon:  Bandar Shea MD;  Location:  [facility];  Service: Neurosurgery;  Laterality: Right;    LUMBAR FUSION N/A 1/19/2018    Procedure: L3-4,L4-5 DECOMPRESSION, POSTERIOR SPINAL FUSION WITH INSTRUMENTATION;  Surgeon: Fortino Oropeza MD;  Location:  [facility];  Service:     LUMBAR LAMINECTOMY WITH FUSION Left 1/17/2018    Procedure: LEFT L3-4 L4-5 LATERAL LUMBAR INTERBODY FUSION;  Surgeon: Fortino Oropeza MD;  Location:  [facility];  Service:     MASS EXCISION Right 4/23/2024    Procedure: RIGHT BUTTOCK MASS EXCISION;  Surgeon: Moises Keyes MD;  Location:  [facility];  Service: General;  Laterality: Right;    MYRINGOTOMY W/ TUBES  09/04/2014    TUBES NO LONGER IN PLACE    OTHER SURGICAL HISTORY      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 so hardware was removed and spacers were placed    REPLACEMENT TOTAL KNEE Right      Social History:  reports that she has never smoked. She has never been exposed to tobacco smoke. She has never used smokeless tobacco. She reports that she does not drink alcohol and does not use drugs.    Family History: family history includes Cancer in her mother; Heart disease in her father.       Allergies:  Allergies   Allergen Reactions    Atorvastatin Other (See Comments)     LEG CRAMPS      Amoxicillin Rash    Escitalopram Rash    Nabumetone Rash    Niacin Er Rash    Penicillin G Rash    Penicillins Rash    Simvastatin Rash       Medications:  Prior to Admission medications    Medication Sig Start Date End Date Taking? Authorizing Provider   acetaminophen (TYLENOL) 325 MG tablet Take 2 tablets by mouth Every 6 (Six) Hours As Needed for Mild Pain (mild pain). Indications: Fever, Pain 6/4/22   Provider, MD Leila   apixaban (Eliquis) 5 MG tablet tablet Take 1 tablet by mouth 2 (Two) Times a Day. 4/23/24   Nayan Hale DO   Ascorbic Acid (Vitamin C) 500 MG capsule Take 1 tablet by mouth Daily. Indications: Inadequate Vitamin C 5/16/24   ProviderLeila MD   aspirin 81 MG EC  tablet Take 1 tablet by mouth Daily. Indications: Disease involving Lipid Deposits in the Arteries    Leila Daly MD   carvedilol (COREG) 25 MG tablet Take 1 tablet by mouth 2 (Two) Times a Day With Meals. Indications: High Blood Pressure Disorder 4/21/24   Leila Daly MD   Diclofenac Sodium (VOLTAREN) 1 % gel gel Apply 4 g topically to the appropriate area as directed 4 (Four) Times a Day As Needed (Mild pain). Indications: Joint Damage causing Pain and Loss of Function 11/4/21   Leila Daly MD   DULoxetine (CYMBALTA) 60 MG capsule Take 1 capsule by mouth Daily. Indications: Musculoskeletal Pain 4/21/24   Leila Daly MD   ferrous sulfate 324 (65 Fe) MG tablet delayed-release EC tablet Take 1 tablet by mouth Daily With Breakfast. Indications: Iron Deficiency 4/21/24   Leila Daly MD   folic acid (FOLVITE) 1 MG tablet Take 1 tablet by mouth Daily. Indications: Anemia From Inadequate Folic Acid 4/21/24   Leila Daly MD   isosorbide mononitrate (IMDUR) 60 MG 24 hr tablet Take 1 tablet by mouth Daily. Indications: Stable Angina Pectoris 4/21/24   Leila Daly MD   leflunomide (ARAVA) 20 MG tablet Take 1 tablet by mouth every night at bedtime. Indications: Rheumatoid Arthritis 2/6/23   Moises Oliveira MD   levothyroxine (SYNTHROID, LEVOTHROID) 75 MCG tablet Take 1 tablet by mouth Daily. Indications: Underactive Thyroid 5/5/24   Leila Daly MD   lidocaine (Lidoderm) 5 % Place 1 patch on the skin as directed by provider Daily As Needed for Mild Pain. Remove & Discard patch within 12 hours or as directed by MD 4/9/24   Niki Palma APRN   losartan (COZAAR) 50 MG tablet Take 1 tablet by mouth Daily. Indications: High Blood Pressure Disorder 4/21/24   Leila Daly MD   magnesium hydroxide (MILK OF MAGNESIA) 400 MG/5ML suspension Take 30 mL by mouth Daily As Needed for Constipation. Indications: Constipation 6/4/22   Addy  "MD Leila   multivitamin with minerals tablet tablet Take 1 tablet by mouth Daily. 3/6/21   Taiwo Prado APRN   nitroglycerin (NITROSTAT) 0.4 MG SL tablet Place 1 tablet under the tongue Every 5 (Five) Minutes As Needed for Chest Pain. Take no more than 3 doses in 15 minutes.  Indications: Acute Angina Pectoris 4/21/24   Leila Daly MD   ondansetron (Zofran) 4 MG tablet Take 1 tablet by mouth Every 8 (Eight) Hours As Needed for Nausea or Vomiting. 1/22/24   Moises Oliveira MD   predniSONE (DELTASONE) 5 MG tablet Take 1 tablet by mouth Daily. Indications: Acute Joint Inflammation in Gout 4/21/24   Leila Daly MD   traMADol (ULTRAM) 50 MG tablet Take 1 tablet by mouth Every 12 (Twelve) Hours As Needed for Moderate Pain. Indications: Pain 5/16/24   Leila Daly MD   valACYclovir (VALTREX) 500 MG tablet Take 1 tablet by mouth Daily As Needed. Indications: Shingles 4/21/24   Leila Daly MD     I have utilized all available immediate resources to obtain, update, or review the patient's current medications (including all prescriptions, over-the-counter products, herbals, cannabis/cannabidiol products, and vitamin/mineral/dietary (nutritional) supplements).    Objective     Vital Signs: /68   Pulse 86   Temp 98.3 °F (36.8 °C) (Oral)   Resp 23   Ht 167.6 cm (66\")   Wt 72.6 kg (160 lb)   LMP  (LMP Unknown)   SpO2 99%   BMI 25.82 kg/m²   Physical Exam   Vitals and nursing note reviewed.   Constitutional:       Comments: Chronically ill.   HENT:      Head: Normocephalic.   Eyes:      Conjunctiva/sclera: Conjunctivae normal.      Pupils: Pupils are equal, round, and reactive to light.   Cardiovascular:      Rate and Rhythm: Normal rate and regular rhythm.      Heart sounds: Normal heart sounds.   Pulmonary:      Effort: Pulmonary effort is normal. No respiratory distress.      Breath sounds: Normal breath sounds.   Abdominal:      General: Bowel sounds are normal. " There is no distension.      Palpations: Abdomen is soft.      Tenderness: There is no abdominal tenderness.      Comments: Obesity .   Musculoskeletal:         General: No swelling.      Cervical back: Neck supple.      Comments: Deformities of the hand from arthritis.   Skin:     General: Skin is warm and dry.      Capillary Refill: Capillary refill takes 2 to 3 seconds.      Findings: No rash.   Neurological:      Mental Status: She is alert and oriented to person, place, and time.      Motor: Weakness present.      Coordination: Coordination abnormal.      Gait: Gait abnormal.   Psychiatric:         Mood and Affect: Mood normal.         Behavior: Behavior normal.         Thought Content: Thought content normal.       Results Reviewed:  Lab Results (last 24 hours)       Procedure Component Value Units Date/Time    Urinalysis With Culture If Indicated - Urine, Clean Catch [[id number]]  (Abnormal) Collected: 05/24/24 1312    Specimen: Urine, Clean Catch Updated: 05/24/24 1343     Color, UA Yellow     Appearance, UA Cloudy     pH, UA 6.5     Specific Gravity, UA 1.008     Glucose, UA Negative     Ketones, UA Negative     Bilirubin, UA Negative     Blood, UA Moderate (2+)     Protein, UA Trace     Leuk Esterase, UA Large (3+)     Nitrite, UA Positive     Urobilinogen, UA 0.2 E.U./dL    Narrative:      In absence of clinical symptoms, the presence of pyuria, bacteria, and/or nitrites on the urinalysis result does not correlate with infection.    Urinalysis, Microscopic Only - Urine, Clean Catch [[id number]]  (Abnormal) Collected: 05/24/24 1312    Specimen: Urine, Clean Catch Updated: 05/24/24 1343     RBC, UA 0-2 /HPF      WBC, UA Too Numerous to Count /HPF      Bacteria, UA 2+ /HPF      Squamous Epithelial Cells, UA None Seen /HPF      Hyaline Casts, UA 0-2 /LPF      Methodology Manual Light Microscopy    Urine Culture - Urine, Urine, Clean Catch [[id number]] Collected: 05/24/24 1312    Specimen: Urine, Clean Catch  "Updated: 05/24/24 1343    D-dimer, Quantitative [[id number]]  (Abnormal) Collected: 05/24/24 1051    Specimen: Blood Updated: 05/24/24 1223     D-Dimer, Quantitative 2.40 MCGFEU/mL     Narrative:      According to the assay 's published package insert, a normal (<0.50 MCGFEU/mL) D-dimer result in conjunction with a non-high clinical probability assessment, excludes deep vein thrombosis (DVT) and pulmonary embolism (PE) with high sensitivity.    D-dimer values increase with age and this can make VTE exclusion of an older population difficult. To address this, the [employer], based on best available evidence and recent guidelines, recommends that clinicians use age-adjusted D-dimer thresholds in patients greater than 50 years of age with: a) a low probability of PE who do not meet all Pulmonary Embolism Rule Out Criteria, or b) in those with intermediate probability of PE.   The formula for an age-adjusted D-dimer cut-off is \"age/100\".  For example, a 60 year old patient would have an age-adjusted cut-off of 0.60 MCGFEU/mL and an 80 year old 0.80 MCGFEU/mL.    COVID-19 and FLU A/B PCR, 1 HR TAT - Swab, Nasopharynx [[id number]]  (Normal) Collected: 05/24/24 1106    Specimen: Swab from Nasopharynx Updated: 05/24/24 1136     COVID19 Not Detected     Influenza A PCR Not Detected     Influenza B PCR Not Detected    Narrative:      Fact sheet for providers: https://www.fda.gov/media/965508/download    Fact sheet for patients: https://www.fda.gov/media/800858/download    Test performed by PCR.    Blood Culture - Blood, Arm, Right [[id number]] Collected: 05/24/24 1110    Specimen: Blood from Arm, Right Updated: 05/24/24 1131    Comprehensive Metabolic Panel [[id number]]  (Abnormal) Collected: 05/24/24 1051    Specimen: Blood Updated: 05/24/24 1125     Glucose 105 mg/dL      BUN 15 mg/dL      Creatinine 1.08 mg/dL      Sodium 136 mmol/L      Potassium 3.6 mmol/L      Chloride 103 mmol/L      CO2 " 23.0 mmol/L      Calcium 8.6 mg/dL      Total Protein 6.0 g/dL      Albumin 2.9 g/dL      ALT (SGPT) 30 U/L      AST (SGOT) 39 U/L      Alkaline Phosphatase 66 U/L      Total Bilirubin 0.7 mg/dL      Globulin 3.1 gm/dL      A/G Ratio 0.9 g/dL      BUN/Creatinine Ratio 13.9     Anion Gap 10.0 mmol/L      eGFR 55.4 mL/min/1.73     Narrative:      GFR Normal >60  Chronic Kidney Disease <60  Kidney Failure <15      Magnesium [[id number]]  (Abnormal) Collected: 05/24/24 1051    Specimen: Blood Updated: 05/24/24 1124     Magnesium 1.5 mg/dL     Lactic Acid, Plasma [[id number]]  (Normal) Collected: 05/24/24 1058    Specimen: Blood Updated: 05/24/24 1123     Lactate 0.9 mmol/L     BNP [[id number]]  (Abnormal) Collected: 05/24/24 1051    Specimen: Blood Updated: 05/24/24 1120     proBNP 5,126.0 pg/mL     Narrative:      This assay is used as an aid in the diagnosis of individuals suspected of having heart failure. It can be used as an aid in the diagnosis of acute decompensated heart failure (ADHF) in patients presenting with signs and symptoms of ADHF to the emergency department (ED). In addition, NT-proBNP of <300 pg/mL indicates ADHF is not likely.    Age Range Result Interpretation  NT-proBNP Concentration (pg/mL:      <50             Positive            >450                   Gray                 300-450                    Negative             <300    50-75           Positive            >900                  Gray                300-900                  Negative            <300      >75             Positive            >1800                  Gray                300-1800                  Negative            <300    [place] Draw [[id number]] Collected: 05/24/24 1051    Specimen: Blood Updated: 05/24/24 1117    Narrative:      The following orders were created for panel order [place] Draw.  Procedure                               Abnormality         Status                     ---------                               -----------          ------                     Green Top (Gel)[[id number]]                                  Final result               Lavender Top[[id number]]                                     Final result               Red Top[[id number]]                                          Final result               [place] Blood Culture Jacob...[[id number]]                      Final result               Gray Top[[id number]]                                         Final result               Light Blue Top[[id number]]                                   Final result                 Please view results for these tests on the individual orders.    [place] Blood Culture Bottle Set [[id number]] Collected: 05/24/24 1051    Specimen: Blood from Hand, Digit Right Updated: 05/24/24 1117     Extra Tube Hold for add-ons.     Comment: Auto resulted.       [place] Top [[id number]] Collected: 05/24/24 1058    Specimen: Blood Updated: 05/24/24 1117     Extra Tube Hold for add-ons.     Comment: Auto resulted.       Blood Culture - Blood, Hand, Digit Right [[id number]] Collected: 05/24/24 1051    Specimen: Blood from Hand, Digit Right Updated: 05/24/24 1111    CBC & Differential [[id number]]  (Abnormal) Collected: 05/24/24 1051    Specimen: Blood Updated: 05/24/24 1105    Narrative:      The following orders were created for panel order CBC & Differential.  Procedure                               Abnormality         Status                     ---------                               -----------         ------                     CBC Auto Differential[[id number]]        Abnormal            Final result                 Please view results for these tests on the individual orders.    CBC Auto Differential [[id number]]  (Abnormal) Collected: 05/24/24 1051    Specimen: Blood Updated: 05/24/24 1105     WBC 6.65 10*3/mm3      RBC 2.82 10*6/mm3      Hemoglobin 8.2 g/dL      Hematocrit 26.7 %      MCV 94.7 fL      MCH 29.1 pg      MCHC 30.7 g/dL      RDW 17.7 %      RDW-SD 60.5 fl       MPV 11.4 fL      Platelets 115 10*3/mm3      Neutrophil % 66.3 %      Lymphocyte % 20.3 %      Monocyte % 11.3 %      Eosinophil % 0.2 %      Basophil % 0.5 %      Immature Grans % 1.4 %      Neutrophils, Absolute 4.42 10*3/mm3      Lymphocytes, Absolute 1.35 10*3/mm3      Monocytes, Absolute 0.75 10*3/mm3      Eosinophils, Absolute 0.01 10*3/mm3      Basophils, Absolute 0.03 10*3/mm3      Immature Grans, Absolute 0.09 10*3/mm3      nRBC 0.5 /100 WBC     Green Top (Gel) [[id number]] Collected: 05/24/24 1051    Specimen: Blood Updated: 05/24/24 1100     Extra Tube Hold for add-ons.     Comment: Auto resulted.       Lavender Top [[id number]] Collected: 05/24/24 1051    Specimen: Blood Updated: 05/24/24 1100     Extra Tube hold for add-on     Comment: Auto resulted       Red Top [[id number]] Collected: 05/24/24 1051    Specimen: Blood Updated: 05/24/24 1100     Extra Tube Hold for add-ons.     Comment: Auto resulted.       Light Blue Top [[id number]] Collected: 05/24/24 1051    Specimen: Blood Updated: 05/24/24 1100     Extra Tube Hold for add-ons.     Comment: Auto resulted             Imaging Results (Last 24 Hours)       Procedure Component Value Units Date/Time    US Gallbladder [[id number]] Resulted: 05/24/24 1425     Updated: 05/24/24 1418    CT Angiogram Chest [[id number]] Collected: 05/24/24 1322     Updated: 05/24/24 1332    Narrative:      EXAM: CT ANGIOGRAM CHEST- - 5/24/2024 11:42 AM     HISTORY: eval for PE; E87.70-Fluid overload, unspecified; R53.81-Other  malaise       COMPARISON: No existing relevant imaging studies available.     DOSE LENGTH PRODUCT: 224.56 mGy.cm. Automatic exposure control was  utilized to make radiation dose as low as reasonably achievable.     TECHNIQUE: Enhanced axial CT images obtained with multiplanar reformats.  3D postprocessing, including MIPs, performed and images saved to PACS.     FINDINGS:  AIRWAYS/PULMONARY PARENCHYMA: The central airways are midline and  patent.  Expiratory phase of imaging.     Small bilateral pleural fluid. Small layering groundglass opacity at the  fissures, which could represent small pulmonary edema/fluid or  atelectasis. No suspicious pulmonary mass or nodule.      VESSELS: Contrast bolus is adequate. No pulmonary artery filling defect  to suggest pulmonary embolus. Aorta normal in course and caliber with  calcified atherosclerosis.     CARDIAC: Cardiomegaly. Heavily calcified coronary arteries verses stent  material. No pericardial effusion.       MEDIASTINUM: There is no mediastinal or hilar lymphadenopathy by CT size  criteria.     The mid and distal esophagus appears diffusely thickened measuring 2.5  cm in cross-section.     EXTRATHORACIC: The visualized portions of the thyroid gland are  unremarkable. No thoracic inlet or axillary adenopathy. Cardiac pulse  generator in the LEFT chest wall. Otherwise overlying soft tissues with  mild stranding, suggest body wall edema.     INCLUDED UPPER ABDOMEN: 7 mm gallstone at the proximal gallbladder neck  or cystic duct. Partially visualized gallbladder appears distended, not  optimally evaluated on this exam. Visualized portion of the liver,  pancreas, spleen, adrenal glands appear within normal limits.     OSSEOUS: Kyphoplasty changes at T12. Mild height loss of T10 appears  similar to 8/22/2023.          Impression:      1. Small bilateral pleural fluid and small layering groundglass opacity  at the fissures, could represent small pulmonary edema/fluid or  atelectasis.  2. Gallstone at the proximal gallbladder neck or cystic duct. Partially  visualized gallbladder appears distended, not optimally evaluated on  this exam. Correlate with patient symptoms and consider dedicated  ultrasound.  3. Diffuse esophageal thickening of the mid and distal esophagus. This  is new compared to 4/22/2022. Recommend referral to gastroenterology for  consideration of direct visualization.     This report was signed and  finalized on 5/24/2024 1:29 PM by Dr Paulina Tavraes MD.       XR Chest 1 View [[id number]] Collected: 05/24/24 1143     Updated: 05/24/24 1147    Narrative:      XR CHEST 1 VW-     HISTORY: shortness of breath     COMPARISON: 5/2/2024     FINDINGS: Frontal view the chest obtained.     Diminished level of inspiration. Similar positioning of the dual-lead  left subclavian cardiac pacer leads. Stable granulomatous calcifications  right hemithorax. Similar cardiomegaly. Borderline prominent pulmonary  vascular structures may relate to vascular crowding versus mild venous  congestion. No lobar consolidation. No pleural effusion or pneumothorax.  Postoperative changes cervical spine. Arthritic changes of the  shoulders.       Impression:      1. Low lung volumes. Stable cardiomegaly with similar positioning of the  left subclavian cardiac pacer leads. Vascular crowding versus mild  venous congestion. No consolidation.        This report was signed and finalized on 5/24/2024 11:44 AM by Dr. Trinity Gómez MD.             I have personally reviewed and interpreted the radiology studies and ECG obtained at time of admission.     Assessment / Plan   Assessment:   Active Hospital Problems    Diagnosis     **Fluid overload     Hypothyroidism     Paralysis     History of urinary retention     Cholelithiasis     Essential hypertension     Coronary artery disease involving native coronary artery of native heart without angina pectoris     Sick sinus syndrome     Venous insufficiency     Overweight (BMI 25.0-29.9)     Rheumatoid arthritis involving multiple sites     (HFpEF) heart failure with preserved ejection fraction     Chronic anticoagulation     Iron deficiency anemia     Osteoporosis     Chronic embolism and thrombosis of unspecified deep veins of left lower extremity     T12 compression fracture, initial encounter        Treatment Plan  The patient will be admitted to my service here at [facility].      Shortness of breath.  Vascular congestion.  Patient received 80 of Lasix in ER.  Chest x-ray-Low lung volumes. Stable cardiomegaly with similar positioning of the  left subclavian cardiac pacer leads- Vascular crowding versus mild venous congestion,  No consolidation.  CT of the chest-Small bilateral pleural fluid and small layering groundglass opacity  at the fissures- could represent small pulmonary edema/fluid or atelectasis, Gallstone at the proximal gallbladder neck or cystic duct- Partially visualized gallbladder appears distended- not optimally evaluated on this exam, Diffuse esophageal thickening of the mid and distal esophagus- is new compared to 4/22/2022, recommend referral to gastroenterology for consideration of direct visualization.  Patient is on 2 L of oxygen.    Gallstone proximal gallbladder neck and cystic duct/diffuse esophageal thickening of mid and distal esophagus. Nontender abdomen right upper quadrant. GI consult.  Ultrasound the gallbladder pending.      UTI.  +2 bacteria urinalysis.  Blood cultures pending.  Urine cultures pending.  Infect disease consult.  History of CRE.  Start cefepime.     Chronic stage IIIb renal failure.  Creatinine 1.08.     Rheumatoid arthritis/Hx Sjogren .  Biotene.  Magic mouthwash. Arava.  Prednisone daily.     History of DVT . Eliquis.     CAD/hypertension/hyperlipidemia/pacemaker due to history of sick sinus syndrome.  Aspirin. .  BNP 5000.  Patient received 80 of Lasix in the ER.  Imdur .  Continue Cozaar.  Nitro as needed.  Coreg.  Daily weight.  Strict in and out.     Depression/anxiety.  Cymbalta.     Hypokalemia.  Normal.      Hypomagnesia.  Patient received 2 g magnesium ER.  Magnesium level in AM.     Anemia.  Hemoglobin 8.2..  No sign of acute bleed.  Folic acid.     Thrombocytopenia.  Platelet counts 115.     Hypothyroidism . Synthroid.       Reflux.  Tums.  Zofran as needed.  Pepcid IV.     Constipation.  Magnesium oxide as needed.  Constipation  protocol  as needed.  .     Pain.  Voltaren gel as needed.  Lidocaine patch as needed.  Ultram as needed.     Nutrition . Folic acid.  Cardiac diet.  Boost supplement.  Vitamin C.     Deconditioning.  PT and OT consult.  Patient get around with a walker at baseline.     Blood cultures pending.  Urine cultures pending.     Dr. Shin's mom.    Blood cultures pending.  Urine culture pending.    Medical Decision Making  Number and Complexity of problems: Shortness of breath/CHF exacerbation/gallstones/thickening esophagus/fail to thrive/rheumatoid arthritis/CAD/  Differential Diagnosis: None    Conditions and Status        Condition is unchanged.     MDM Data  External documents reviewed: Previous note .  Cardiac tracing (EKG, telemetry) interpretation: Sinus rhythm .  Radiology interpretation: CT scan/x-ray  Labs reviewed: Laboratory  Any tests that were considered but not ordered: Laboratory in AM     Decision rules/scores evaluated (example [id number], Wells, etc): None     Discussed with: Patient and caregiver.     Care Planning  Shared decision making: Patient and caregiver.  Code status and discussions: Full code.    Disposition  Social Determinants of Health that impact treatment or disposition: From home.  Estimated length of stay is 2 to 5 days.     I confirmed that the patient's advanced care plan is present, code status is documented, and a surrogate decision maker is listed in the patient's medical record.     The patient's surrogate decision maker is son, Dr Shin    The patient was seen and examined by me on 5/24/2024 at 1420.    Electronically signed by Saulo Ambriz MD, 05/24/24, 14:25 CDT.          Daily weight.  Strict in and out

## 2024-05-25 LAB
ALBUMIN SERPL-MCNC: 2.8 G/DL (ref 3.5–5.2)
ALBUMIN/GLOB SERPL: 0.9 G/DL
ALP SERPL-CCNC: 62 U/L (ref 39–117)
ALT SERPL W P-5'-P-CCNC: 26 U/L (ref 1–33)
ANION GAP SERPL CALCULATED.3IONS-SCNC: 11 MMOL/L (ref 5–15)
AST SERPL-CCNC: 26 U/L (ref 1–32)
BACTERIA BLD CULT: ABNORMAL
BASOPHILS # BLD AUTO: 0.03 10*3/MM3 (ref 0–0.2)
BASOPHILS NFR BLD AUTO: 0.4 % (ref 0–1.5)
BILIRUB SERPL-MCNC: 0.5 MG/DL (ref 0–1.2)
BOTTLE TYPE: ABNORMAL
BUN SERPL-MCNC: 22 MG/DL (ref 8–23)
BUN/CREAT SERPL: 17.2 (ref 7–25)
CALCIUM SPEC-SCNC: 8.5 MG/DL (ref 8.6–10.5)
CHLORIDE SERPL-SCNC: 103 MMOL/L (ref 98–107)
CHOLEST SERPL-MCNC: 171 MG/DL (ref 0–200)
CO2 SERPL-SCNC: 27 MMOL/L (ref 22–29)
CREAT SERPL-MCNC: 1.28 MG/DL (ref 0.57–1)
DEPRECATED RDW RBC AUTO: 59.8 FL (ref 37–54)
EGFRCR SERPLBLD CKD-EPI 2021: 45.2 ML/MIN/1.73
EOSINOPHIL # BLD AUTO: 0.01 10*3/MM3 (ref 0–0.4)
EOSINOPHIL NFR BLD AUTO: 0.1 % (ref 0.3–6.2)
ERYTHROCYTE [DISTWIDTH] IN BLOOD BY AUTOMATED COUNT: 17.6 % (ref 12.3–15.4)
GLOBULIN UR ELPH-MCNC: 3.2 GM/DL
GLUCOSE SERPL-MCNC: 106 MG/DL (ref 65–99)
HBA1C MFR BLD: 6.2 % (ref 4.8–5.6)
HCT VFR BLD AUTO: 24.6 % (ref 34–46.6)
HDLC SERPL-MCNC: 23 MG/DL (ref 40–60)
HGB BLD-MCNC: 7.5 G/DL (ref 12–15.9)
IMM GRANULOCYTES # BLD AUTO: 0.12 10*3/MM3 (ref 0–0.05)
IMM GRANULOCYTES NFR BLD AUTO: 1.5 % (ref 0–0.5)
LDLC SERPL CALC-MCNC: 104 MG/DL (ref 0–100)
LDLC/HDLC SERPL: 4.24 {RATIO}
LYMPHOCYTES # BLD AUTO: 1.44 10*3/MM3 (ref 0.7–3.1)
LYMPHOCYTES NFR BLD AUTO: 17.8 % (ref 19.6–45.3)
MAGNESIUM SERPL-MCNC: 1.7 MG/DL (ref 1.6–2.4)
MCH RBC QN AUTO: 28.6 PG (ref 26.6–33)
MCHC RBC AUTO-ENTMCNC: 30.5 G/DL (ref 31.5–35.7)
MCV RBC AUTO: 93.9 FL (ref 79–97)
MONOCYTES # BLD AUTO: 1.03 10*3/MM3 (ref 0.1–0.9)
MONOCYTES NFR BLD AUTO: 12.7 % (ref 5–12)
NEUTROPHILS NFR BLD AUTO: 5.46 10*3/MM3 (ref 1.7–7)
NEUTROPHILS NFR BLD AUTO: 67.5 % (ref 42.7–76)
NRBC BLD AUTO-RTO: 0.2 /100 WBC (ref 0–0.2)
PLATELET # BLD AUTO: 114 10*3/MM3 (ref 140–450)
PMV BLD AUTO: 10.8 FL (ref 6–12)
POTASSIUM SERPL-SCNC: 3.5 MMOL/L (ref 3.5–5.2)
PROT SERPL-MCNC: 6 G/DL (ref 6–8.5)
RBC # BLD AUTO: 2.62 10*6/MM3 (ref 3.77–5.28)
SODIUM SERPL-SCNC: 141 MMOL/L (ref 136–145)
TRIGL SERPL-MCNC: 252 MG/DL (ref 0–150)
TSH SERPL DL<=0.05 MIU/L-ACNC: 2 UIU/ML (ref 0.27–4.2)
VLDLC SERPL-MCNC: 44 MG/DL (ref 5–40)
WBC NRBC COR # BLD AUTO: 8.09 10*3/MM3 (ref 3.4–10.8)

## 2024-05-25 PROCEDURE — 63710000001 PREDNISONE PER 5 MG: Performed by: FAMILY MEDICINE

## 2024-05-25 PROCEDURE — 84443 ASSAY THYROID STIM HORMONE: CPT | Performed by: FAMILY MEDICINE

## 2024-05-25 PROCEDURE — 83735 ASSAY OF MAGNESIUM: CPT | Performed by: FAMILY MEDICINE

## 2024-05-25 PROCEDURE — 99232 SBSQ HOSP IP/OBS MODERATE 35: CPT | Performed by: INTERNAL MEDICINE

## 2024-05-25 PROCEDURE — 99222 1ST HOSP IP/OBS MODERATE 55: CPT | Performed by: INTERNAL MEDICINE

## 2024-05-25 PROCEDURE — 92610 EVALUATE SWALLOWING FUNCTION: CPT | Performed by: SPEECH-LANGUAGE PATHOLOGIST

## 2024-05-25 PROCEDURE — 25010000002 CEFEPIME PER 500 MG: Performed by: FAMILY MEDICINE

## 2024-05-25 PROCEDURE — 80061 LIPID PANEL: CPT | Performed by: FAMILY MEDICINE

## 2024-05-25 PROCEDURE — 85025 COMPLETE CBC W/AUTO DIFF WBC: CPT | Performed by: FAMILY MEDICINE

## 2024-05-25 PROCEDURE — 83036 HEMOGLOBIN GLYCOSYLATED A1C: CPT | Performed by: FAMILY MEDICINE

## 2024-05-25 PROCEDURE — 80053 COMPREHEN METABOLIC PANEL: CPT | Performed by: FAMILY MEDICINE

## 2024-05-25 RX ORDER — FUROSEMIDE 40 MG/1
1 TABLET ORAL DAILY
COMMUNITY
Start: 2024-05-25

## 2024-05-25 RX ADMIN — ISOSORBIDE MONONITRATE 60 MG: 60 TABLET, EXTENDED RELEASE ORAL at 08:22

## 2024-05-25 RX ADMIN — FOLIC ACID 1000 MCG: 1 TABLET ORAL at 08:21

## 2024-05-25 RX ADMIN — DULOXETINE HYDROCHLORIDE 30 MG: 30 CAPSULE, DELAYED RELEASE ORAL at 08:21

## 2024-05-25 RX ADMIN — ACETAMINOPHEN 650 MG: 325 TABLET, FILM COATED ORAL at 12:10

## 2024-05-25 RX ADMIN — LEFLUNOMIDE 20 MG: 20 TABLET ORAL at 08:21

## 2024-05-25 RX ADMIN — PREDNISONE 5 MG: 5 TABLET ORAL at 08:22

## 2024-05-25 RX ADMIN — FAMOTIDINE 20 MG: 10 INJECTION INTRAVENOUS at 09:50

## 2024-05-25 RX ADMIN — DIPHENHYDRAMINE HYDROCHLORIDE AND LIDOCAINE HYDROCHLORIDE AND ALUMINUM HYDROXIDE AND MAGNESIUM HYDRO 5 ML: KIT at 21:27

## 2024-05-25 RX ADMIN — Medication 10 ML: at 20:07

## 2024-05-25 RX ADMIN — APIXABAN 5 MG: 5 TABLET, FILM COATED ORAL at 20:07

## 2024-05-25 RX ADMIN — Medication 1 TABLET: at 08:23

## 2024-05-25 RX ADMIN — OXYCODONE HYDROCHLORIDE AND ACETAMINOPHEN 250 MG: 500 TABLET ORAL at 08:23

## 2024-05-25 RX ADMIN — DIPHENHYDRAMINE HYDROCHLORIDE AND LIDOCAINE HYDROCHLORIDE AND ALUMINUM HYDROXIDE AND MAGNESIUM HYDRO 5 ML: KIT at 15:40

## 2024-05-25 RX ADMIN — FAMOTIDINE 20 MG: 10 INJECTION INTRAVENOUS at 20:07

## 2024-05-25 RX ADMIN — DIPHENHYDRAMINE HYDROCHLORIDE AND LIDOCAINE HYDROCHLORIDE AND ALUMINUM HYDROXIDE AND MAGNESIUM HYDRO 5 ML: KIT at 09:50

## 2024-05-25 RX ADMIN — ACETAMINOPHEN 650 MG: 325 TABLET, FILM COATED ORAL at 02:12

## 2024-05-25 RX ADMIN — APIXABAN 5 MG: 5 TABLET, FILM COATED ORAL at 08:23

## 2024-05-25 RX ADMIN — ASPIRIN 81 MG: 81 TABLET, COATED ORAL at 08:22

## 2024-05-25 RX ADMIN — LOSARTAN POTASSIUM 50 MG: 50 TABLET, FILM COATED ORAL at 08:23

## 2024-05-25 RX ADMIN — LEVOTHYROXINE SODIUM 75 MCG: 0.07 TABLET ORAL at 05:58

## 2024-05-25 RX ADMIN — TRAMADOL HYDROCHLORIDE 50 MG: 50 TABLET ORAL at 17:54

## 2024-05-25 RX ADMIN — LIDOCAINE 2 PATCH: 4 PATCH TOPICAL at 08:24

## 2024-05-25 RX ADMIN — CEFEPIME 2000 MG: 2 INJECTION, POWDER, FOR SOLUTION INTRAVENOUS at 15:39

## 2024-05-25 RX ADMIN — Medication 10 ML: at 08:24

## 2024-05-25 RX ADMIN — FERROUS SULFATE TAB 325 MG (65 MG ELEMENTAL FE) 325 MG: 325 (65 FE) TAB at 08:22

## 2024-05-25 RX ADMIN — CEFEPIME 2000 MG: 2 INJECTION, POWDER, FOR SOLUTION INTRAVENOUS at 02:05

## 2024-05-25 RX ADMIN — CARVEDILOL 25 MG: 25 TABLET, FILM COATED ORAL at 08:22

## 2024-05-25 NOTE — PROGRESS NOTES
[facility] Medicine Services  INPATIENT PROGRESS NOTE    Patient Name: Tawny Shin  Date of Admission: 5/24/2024  Today's Date: 05/25/24  Length of Stay: 1  Primary Care Physician: Moises Oliveira MD    Subjective   Chief Complaint: Shortness of breath/weakness.   HPI   Net urine output -3000.  Patient is on room air, shortness of breath resolved.  Patient still remains weak.  Patient complain of being cold.  Blood pressure stable, afebrile.  Recommendation from GI discussed with patient.  Increase in creatinine secondary to Lasix.  White blood cells normal.  Slight decrease in hemoglobin, anemia panel for now.  Blood culture result discussed with patient.    Review of Systems   Constitutional:  Positive for activity change, appetite change and fatigue. Negative for chills and fever.   HENT:  Negative for hearing loss, nosebleeds, tinnitus and trouble swallowing.    Eyes:  Negative for visual disturbance.   Respiratory:  Negative for cough, chest tightness, shortness of breath and wheezing.    Cardiovascular:  Negative for chest pain, palpitations and leg swelling.   Gastrointestinal:  Negative for abdominal distention, abdominal pain, blood in stool, constipation, diarrhea, nausea and vomiting.   Endocrine: Negative for cold intolerance, heat intolerance, polydipsia, polyphagia and polyuria.   Genitourinary:  Negative for decreased urine volume, difficulty urinating, dysuria, flank pain, frequency and hematuria.   Musculoskeletal:  Positive for arthralgias, gait problem and myalgias. Negative for joint swelling.   Skin:  Negative for rash.   Allergic/Immunologic: Negative for immunocompromised state.   Neurological:  Positive for weakness. Negative for dizziness, syncope, light-headedness and headaches.   Hematological:  Negative for adenopathy. Does not bruise/bleed easily.   Psychiatric/Behavioral:  Negative for confusion and sleep disturbance. The patient is not  nervous/anxious.   Otherwise complete ROS reviewed and negative except as mentioned in the HPI.  All pertinent negatives and positives are as above. All other systems have been reviewed and are negative unless otherwise stated.     Objective    Temp:  [97.6 °F (36.4 °C)-98.3 °F (36.8 °C)] 98 °F (36.7 °C)  Heart Rate:  [71-93] 78  Resp:  [16-23] 16  BP: (114-169)/(50-94) 138/74      Intake/Output Summary (Last 24 hours) at 5/25/2024 0932  Last data filed at 5/25/2024 0552  Gross per 24 hour   Intake 120 ml   Output 3150 ml   Net -3030 ml      Physical Exam  Vitals and nursing note reviewed.   Constitutional:       Comments: Chronically ill.   HENT:      Head: Normocephalic.   Eyes:      Conjunctiva/sclera: Conjunctivae normal.      Pupils: Pupils are equal, round, and reactive to light.   Cardiovascular:      Rate and Rhythm: Normal rate and regular rhythm.      Heart sounds: Normal heart sounds.   Pulmonary:      Effort: Pulmonary effort is normal. No respiratory distress.      Breath sounds: Normal breath sounds.   Abdominal:      General: Bowel sounds are normal. There is no distension.      Palpations: Abdomen is soft.      Tenderness: There is no abdominal tenderness.      Comments: Obesity .   Musculoskeletal:         General: No swelling.      Cervical back: Neck supple.      Comments: Deformities of the hand from arthritis.   Skin:     General: Skin is warm and dry.      Capillary Refill: Capillary refill takes 2 to 3 seconds.      Findings: No rash.   Neurological:      Mental Status: She is alert and oriented to person, place, and time.      Motor: Weakness present.      Coordination: Coordination abnormal.      Gait: Gait abnormal.   Psychiatric:         Mood and Affect: Mood normal.         Behavior: Behavior normal.         Thought Content: Thought content normal.          Results Review:  I have reviewed the labs, radiology results, and diagnostic studies.    Laboratory Data:   Results from last 7 days    Lab Units 05/25/24  0537 05/24/24  1051   WBC 10*3/mm3 8.09 6.65   HEMOGLOBIN g/dL 7.5* 8.2*   HEMATOCRIT % 24.6* 26.7*   PLATELETS 10*3/mm3 114* 115*        Results from last 7 days   Lab Units 05/25/24  0537 05/24/24  1051   SODIUM mmol/L 141 136   POTASSIUM mmol/L 3.5 3.6   CHLORIDE mmol/L 103 103   CO2 mmol/L 27.0 23.0   BUN mg/dL 22 15   CREATININE mg/dL 1.28* 1.08*   CALCIUM mg/dL 8.5* 8.6   BILIRUBIN mg/dL 0.5 0.7   ALK PHOS U/L 62 66   ALT (SGPT) U/L 26 30   AST (SGOT) U/L 26 39*   GLUCOSE mg/dL 106* 105*       Culture Data:   Blood Culture   Date Value Ref Range Status   05/24/2024 Abnormal Stain (C)  Preliminary       Radiology Data:   Imaging Results (Last 24 Hours)       Procedure Component Value Units Date/Time    US Gallbladder [[id number]] Collected: 05/24/24 1425     Updated: 05/24/24 1429    Narrative:      EXAMINATION: US GALLBLADDER-     5/24/2024 1:01 PM     HISTORY: f/u on CTA chest abnormality; E87.70-Fluid overload,  unspecified; R53.81-Other malaise     Grey-scale and color flow ultrasound evaluation of the RUQ.     Appropriate size and position of the gallbladder.  9 mm gallstone.  No gallbladder wall thickening.  No biliary dilation.  CBD = 4 mm.     The visualized portion of the right hepatic lobe is normal.     The right kidney is normal, to the extent visualized.     No free fluid at the right upper quadrant.       Impression:      1. Gallstone with no sign of cholecystitis.                    This report was signed and finalized on 5/24/2024 2:26 PM by Dr. Jesus Manuel Santos MD.       CT Angiogram Chest [[id number]] Collected: 05/24/24 1322     Updated: 05/24/24 1332    Narrative:      EXAM: CT ANGIOGRAM CHEST- - 5/24/2024 11:42 AM     HISTORY: eval for PE; E87.70-Fluid overload, unspecified; R53.81-Other  malaise       COMPARISON: No existing relevant imaging studies available.     DOSE LENGTH PRODUCT: 224.56 mGy.cm. Automatic exposure control was  utilized to make radiation dose as low as  reasonably achievable.     TECHNIQUE: Enhanced axial CT images obtained with multiplanar reformats.  3D postprocessing, including MIPs, performed and images saved to PACS.     FINDINGS:  AIRWAYS/PULMONARY PARENCHYMA: The central airways are midline and  patent. Expiratory phase of imaging.     Small bilateral pleural fluid. Small layering groundglass opacity at the  fissures, which could represent small pulmonary edema/fluid or  atelectasis. No suspicious pulmonary mass or nodule.      VESSELS: Contrast bolus is adequate. No pulmonary artery filling defect  to suggest pulmonary embolus. Aorta normal in course and caliber with  calcified atherosclerosis.     CARDIAC: Cardiomegaly. Heavily calcified coronary arteries verses stent  material. No pericardial effusion.       MEDIASTINUM: There is no mediastinal or hilar lymphadenopathy by CT size  criteria.     The mid and distal esophagus appears diffusely thickened measuring 2.5  cm in cross-section.     EXTRATHORACIC: The visualized portions of the thyroid gland are  unremarkable. No thoracic inlet or axillary adenopathy. Cardiac pulse  generator in the LEFT chest wall. Otherwise overlying soft tissues with  mild stranding, suggest body wall edema.     INCLUDED UPPER ABDOMEN: 7 mm gallstone at the proximal gallbladder neck  or cystic duct. Partially visualized gallbladder appears distended, not  optimally evaluated on this exam. Visualized portion of the liver,  pancreas, spleen, adrenal glands appear within normal limits.     OSSEOUS: Kyphoplasty changes at T12. Mild height loss of T10 appears  similar to 8/22/2023.          Impression:      1. Small bilateral pleural fluid and small layering groundglass opacity  at the fissures, could represent small pulmonary edema/fluid or  atelectasis.  2. Gallstone at the proximal gallbladder neck or cystic duct. Partially  visualized gallbladder appears distended, not optimally evaluated on  this exam. Correlate with patient  symptoms and consider dedicated  ultrasound.  3. Diffuse esophageal thickening of the mid and distal esophagus. This  is new compared to 4/22/2022. Recommend referral to gastroenterology for  consideration of direct visualization.     This report was signed and finalized on 5/24/2024 1:29 PM by Dr Paulina Tavares MD.       XR Chest 1 View [[id number]] Collected: 05/24/24 1143     Updated: 05/24/24 1147    Narrative:      XR CHEST 1 VW-     HISTORY: shortness of breath     COMPARISON: 5/2/2024     FINDINGS: Frontal view the chest obtained.     Diminished level of inspiration. Similar positioning of the dual-lead  left subclavian cardiac pacer leads. Stable granulomatous calcifications  right hemithorax. Similar cardiomegaly. Borderline prominent pulmonary  vascular structures may relate to vascular crowding versus mild venous  congestion. No lobar consolidation. No pleural effusion or pneumothorax.  Postoperative changes cervical spine. Arthritic changes of the  shoulders.       Impression:      1. Low lung volumes. Stable cardiomegaly with similar positioning of the  left subclavian cardiac pacer leads. Vascular crowding versus mild  venous congestion. No consolidation.        This report was signed and finalized on 5/24/2024 11:44 AM by Dr. Trinity Gómez MD.               I have reviewed the patient's current medications.     Assessment/Plan   Assessment  Active Hospital Problems    Diagnosis     **Fluid overload     Hypothyroidism     Paralysis     History of urinary retention     Cholelithiasis     Essential hypertension     Coronary artery disease involving native coronary artery of native heart without angina pectoris     Sick sinus syndrome     Venous insufficiency     Overweight (BMI 25.0-29.9)     Rheumatoid arthritis involving multiple sites     (HFpEF) heart failure with preserved ejection fraction     Chronic anticoagulation     Iron deficiency anemia     Osteoporosis     Chronic embolism and  thrombosis of unspecified deep veins of left lower extremity     T12 compression fracture, initial encounter        Treatment Plan  Shortness of breath.  Vascular congestion.  Patient received 80 of Lasix in ER.  Chest x-ray-Low lung volumes. Stable cardiomegaly with similar positioning of the  left subclavian cardiac pacer leads- Vascular crowding versus mild venous congestion,  No consolidation.  CT of the chest-Small bilateral pleural fluid and small layering groundglass opacity  at the fissures- could represent small pulmonary edema/fluid or atelectasis, Gallstone at the proximal gallbladder neck or cystic duct- Partially visualized gallbladder appears distended- not optimally evaluated on this exam, Diffuse esophageal thickening of the mid and distal esophagus- is new compared to 4/22/2022, recommend referral to gastroenterology for consideration of direct visualization.  Patient is on room air.     Gallstone proximal gallbladder neck and cystic duct/diffuse esophageal thickening of mid and distal esophagus. GI consult.  Ultrasound the gallbladder-Gallstone with no sign of cholecystitis.   GI recommendation-no additional workup for cholelithiasis until patients become symptomatic, dysphagia for last couple months-plan for upper endoscopy next week once respiratory status improved, outpatient colonoscopy because this past due.      UTI.  +2 bacteria urinalysis.   Urine cultures pending.  Infect disease consult.  History of CRE.  Continue cefepime.     Chronic stage IIIb renal failure.  Creatinine increased to 1.28, probably secondary to Lasix.     Rheumatoid arthritis/Hx Sjogren .  Biotene.  Magic mouthwash. Arava.  Prednisone daily.     History of DVT . Eliquis.     CAD/hypertension/hyperlipidemia/pacemaker due to history of sick sinus syndrome.  Aspirin. .  BNP 5000 in ER.  Patient received 80 of Lasix in the ER.  Imdur .  Continue Cozaar.  Nitro as needed.  Coreg.  Daily weight.  Strict in and out.  Allergic  to statin, possible Lovaza at discharge.     Depression/anxiety.  Cymbalta.     Hypokalemia.  Normal.       Hypomagnesia.  Resolved.  Patient received 2 g magnesium ER yesterday.     Anemia.  Hemoglobin decreased..  No sign of acute bleed.  Folic acid.  Anemia panel.     Thrombocytopenia.  Platelet counts stable.     Hypothyroidism . Synthroid.  TSH-normal.     Reflux.  Tums.  Zofran as needed.  Pepcid IV.     Constipation.  Magnesium oxide as needed.  Constipation protocol  as needed.  .     Pain.  Voltaren gel as needed.  Lidocaine patch as needed.  Ultram as needed.     Nutrition . Folic acid.  Cardiac diet.  Boost supplement.  Vitamin C.     Deconditioning.  PT and OT consult.  Patient get around with a walker at baseline.     Dr. Shin's mom.     Blood cultures Proteus 1 out of 2 bottles.  Urine culture pending.     Medical Decision Making  Number and Complexity of problems: Shortness of breath/CHF exacerbation/gallstones/thickening esophagus/fail to thrive/rheumatoid arthritis/CAD/  Differential Diagnosis: None     Conditions and Status        Condition is unchanged.     MDM Data  External documents reviewed: Previous note .  Cardiac tracing (EKG, telemetry) interpretation: Sinus rhythm .  Radiology interpretation: CT scan/x-ray  Labs reviewed: Laboratory  Any tests that were considered but not ordered: Laboratory in AM     Decision rules/scores evaluated (example [id number], Wells, etc): None     Discussed with: Patient and caregiver.     Care Planning  Shared decision making: Patient and caregiver.  Code status and discussions: Full code.     Disposition  Social Determinants of Health that impact treatment or disposition: From home.  Estimated length of stay is 2 to 5 days.     Electronically signed by Saulo Ambriz MD, 05/25/24, 09:31 CDT.

## 2024-05-25 NOTE — PLAN OF CARE
Goal Outcome Evaluation:  Plan of Care Reviewed With: patient        Progress: improving  Outcome Evaluation: Nutrition assessment completed. Pt reports she has a healing wound to right gluteal. Pt agreeable to oral supplement.Pt with volume overload and UTI. SLP completed bedside swallow due to pt complaining of trouble swallowing solids/ and liquids over the past few months. Pt has GI consult plans are for Upper endo next week. Cont to follow for plan of care.

## 2024-05-25 NOTE — CONSULTS
"INFECTIOUS DISEASES CONSULT NOTE    Patient:  Tawny Shin 68 y.o. female  ROOM # 362/1  YOB: 1955  MRN: [id number]  [facility]:  [id number]  Admit date: 5/24/2024   Admitting Physician: Saulo Ambriz MD  Primary Care Physician: Moises Oliveira MD  REFERRING PROVIDER: Saulo Ambriz MD    Reason for Consultation: \"UTI recommendations\"    History of Present Illness/Chief Complaint: Pleasant 68-year-old woman.  History of recent admission for UTI.  She was discharged back home after completing antibiotic treatment.  She receives care at home with 24-hour care assistance.  She indicates she developed some increased weakness.  She had fatigue.  She felt generalized discomfort.  She noted some dyspnea but no cough.  She indicates she was having some chills but did not check her temperature.  She was brought back to the emergency room.  She was admitted for further management.  She has had positive blood cultures for gram-negative rods.  Infectious disease asked to evaluate and offer recommendations.    Current Scheduled Medications:   apixaban, 5 mg, Oral, BID  ascorbic acid, 250 mg, Oral, Daily  aspirin, 81 mg, Oral, Daily  carvedilol, 25 mg, Oral, BID With Meals  cefepime, 2,000 mg, Intravenous, Q12H  DULoxetine, 30 mg, Oral, Daily  famotidine, 20 mg, Intravenous, BID  ferrous sulfate, 325 mg, Oral, Daily With Breakfast  First Mouthwash (Magic Mouthwash), 5 mL, Swish & Spit, Q6H  folic acid, 1,000 mcg, Oral, Daily  isosorbide mononitrate, 60 mg, Oral, Daily  leflunomide, 20 mg, Oral, Daily  levothyroxine, 75 mcg, Oral, Q AM  Lidocaine, 2 patch, Transdermal, Q24H  losartan, 50 mg, Oral, Daily  multivitamin with minerals, 1 tablet, Oral, Daily  predniSONE, 5 mg, Oral, Daily  sodium chloride, 10 mL, Intravenous, Q12H      Current PRN Medications:    acetaminophen    albuterol    Biotene dry mouth    senna-docusate sodium **AND** polyethylene glycol **AND** bisacodyl **AND** bisacodyl    Diclofenac " Sodium    magnesium hydroxide    nitroglycerin    ondansetron    sodium chloride    sodium chloride    sodium chloride    traMADol    valACYclovir    Allergies:    Allergies   Allergen Reactions    Atorvastatin Other (See Comments)     LEG CRAMPS      Amoxicillin Rash    Escitalopram Rash    Nabumetone Rash    Niacin Er Rash    Penicillin G Rash    Penicillins Rash    Simvastatin Rash          Past Medical History:   Diagnosis Date    Age-related osteoporosis with current pathological fracture 05/27/2020    Arthritis      Asthma      Bilateral bunions 12/23/2020    Cancer      Cardiac pacemaker syndrome 12/23/2020     Overview:  - heart block - implanted 11/16    Charcot's joint of foot, left 12/23/2020    Chronic deep vein thrombosis (DVT) of right lower extremity 06/23/2021    Chronic pain syndrome 06/22/2021    Chronic sinusitis      COPD (chronic obstructive pulmonary disease)      Coronary artery disease      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    Elevated cholesterol      Eustachian tube dysfunction      Heart disease      Herpes simplex      History of transfusion      Hyperlipidemia      Hypertension      Hypothyroidism 12/23/2020    Intrinsic asthma 12/23/2020    Knee dislocation      Labral tear of right hip joint      Laryngitis sicca      Laryngitis, chronic      Left carotid bruit 03/09/2016    MI (myocardial infarction)      Myalgia due to statin 06/25/2019    Open wound of right hip 09/14/2021    Osteomyelitis of right femur 07/06/2021    Otorrhea      Pacemaker 11/17/2016    Primary osteoarthritis of left knee 12/23/2020    Psoriasis vulgaris 12/23/2020    S/P coronary artery stent placement 03/09/2016    Sensorineural hearing loss      Seropositive rheumatoid arthritis of multiple sites 12/27/2019     Overview:  -myochrysine '93-'96 -methotrexate '96--->11/98;r/s  restarted 2/99--> 8/14 (anemia) -sulfasalazine- not effective -penicillamine 6/98-->10/98; no effect -leflunomide 11/98--> -  "Humira '13-->didn't take - Enbrel 12/14-->3/15- no effect!   Last Assessment & Plan:  - \"aching all over\" because she had to be off her anti-rheumatic drugs for 2 weeks in preparation for her R knee surgery - he    Sick sinus syndrome 12/27/2019    Sjogren's disease      Spondylolisthesis of lumbar region 01/17/2018    Syncope, recurrent 02/08/2021    Urinary tract infection        E. coli bacteremia  April, 2024  Methicillin susceptible Staphylococcus aureus bacteremia February, 2021  Methicillin susceptible Staphylococcus aureus right hip infection  Right psoas abscess status post aspiration x 2-secondary to methicillin susceptible Staphylococcus aureus  Cervical stenosis status post decompression  L1-L2 discitis/osteomyelitis debridement and laminectomy     Surgical History         Past Surgical History:   Procedure Laterality Date    A-V CARDIAC PACEMAKER INSERTION   2016    ATRIAL CARDIAC PACEMAKER INSERTION        CARDIAC CATHETERIZATION        CATARACT EXTRACTION        CERVICAL CORPECTOMY N/A 3/3/2021     Procedure: CERVICAL 6 CORPECTOMY WITH TITANIUM CAGE WITH NEURO MONITORING;  Surgeon: Bandar Shea MD;  Location:  [facility];  Service: Neurosurgery;  Laterality: N/A;    COLONOSCOPY   11/08/2011     One fold in the ascending colon which showed ulcer otherwise normal exam    COLONOSCOPY   11/12/2004     Normal exam repeat in five years    CORONARY ANGIOPLASTY WITH STENT PLACEMENT         X 2; 2013 & 2014    ENDOSCOPY   07/10/2014     Normal exam    FLAP LEG Right 9/14/2021     Procedure: RIGHT GLUTEAL FASCIOCUTANEOUS ADVANCEMENT FLAP AND RIGHT TENSOR FASCIAL JESSICA FLAP;  Surgeon: Amadeo Turner MD;  Location:  [facility];  Service: Plastics;  Laterality: Right;    HIP ABDUCTION TENOTOMY BILATERAL Right 1/14/2021     Procedure: RIGHT HIP GLUTEUS MEDLUS / MINIMUS REPAIR, POSSIBLE ACHILLES ALLOGRAFT;  Surgeon: Nino Carlson MD;  Location:  [facility];  Service: Orthopedics;  Laterality: Right;    " INCISION AND DRAINAGE ABSCESS Right 6/4/2022     Procedure: INCISION AND DRAINAGE ABSCESS right hip;  Surgeon: Magda Salcido MD;  Location:  [facility];  Service: General;  Laterality: Right;    INCISION AND DRAINAGE ABSCESS Right 6/10/2022     Procedure: RIGHT HIP INCISION AND DRAINAGE. MD NEEDS 3L VANC IRRIGATION, CURRETTES, DAICANS, KERLEX ROLLS;  Surgeon: Amadeo Turner MD;  Location:  [facility];  Service: Plastics;  Laterality: Right;    INCISION AND DRAINAGE HIP Right 2/9/2021     Procedure: HIP INCISION AND DRAINAGE;  Surgeon: Nino Carlson MD;  Location:  [facility];  Service: Orthopedics;  Laterality: Right;    INCISION AND DRAINAGE LEG Right 10/24/2021     Procedure: INCISION AND DRAINAGE LOWER EXTREMITY;  Surgeon: Amadeo Turner MD;  Location:  [facility];  Service: Plastics;  Laterality: Right;    INCISION AND DRAINAGE OF WOUND Right 7/8/2021     Procedure: INCISION AND DRAINAGE WOUND RIGHT HIP;  Surgeon: James Huntley MD;  Location:  [facility];  Service: Orthopedics;  Laterality: Right;    JOINT REPLACEMENT        KYPHOPLASTY WITH BIOPSY Bilateral 10/26/2021     Procedure: THOARCIC 12 KYPHOPLASTY WITH BIOPSY;  Surgeon: Bandar Shea MD;  Location:  [facility];  Service: Neurosurgery;  Laterality: Bilateral;    LEG DEBRIDEMENT Right 9/14/2021     Procedure: DEBRIDEMENT OF RIGHT HIP WOUND, RIGHT GLUTEAL FASCIOCUTANEOUS ADVANCEMENT FLAP AND RIGHT TENSOR FASCIAL JESSICA FLAP;  Surgeon: Amadeo Turner MD;  Location:  [facility];  Service: Plastics;  Laterality: Right;    LUMBAR DISCECTOMY Right 3/23/2021     Procedure: LUMBAR DISCECTOMY MICRO, Lumbar 1/2 right;  Surgeon: Bandar Shea MD;  Location:  [facility];  Service: Neurosurgery;  Laterality: Right;    LUMBAR FUSION N/A 1/19/2018     Procedure: L3-4,L4-5 DECOMPRESSION, POSTERIOR SPINAL FUSION WITH INSTRUMENTATION;  Surgeon: Fortino Oropeza MD;  Location:  [facility];  Service:     LUMBAR LAMINECTOMY WITH FUSION Left  "2018     Procedure: LEFT L3-4 L4-5 LATERAL LUMBAR INTERBODY FUSION;  Surgeon: Fortino Oropeza MD;  Location:  [facility];  Service:     MASS EXCISION Right 2024     Procedure: RIGHT BUTTOCK MASS EXCISION;  Surgeon: Moises Keyes MD;  Location:  [facility];  Service: General;  Laterality: Right;    MYRINGOTOMY W/ TUBES   2014     TUBES NO LONGER IN PLACE    OTHER SURGICAL HISTORY         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 so hardware was removed and spacers were placed    REPLACEMENT TOTAL KNEE Right                 Family History   Problem Relation Age of Onset    Cancer Mother           Lung cancer    Heart disease Father           Doied of heart. Attack      Social History[]Expand by Default   Social History            Socioeconomic History    Marital status:    Tobacco Use    Smoking status: Never       Passive exposure: Never    Smokeless tobacco: Never   Vaping Use    Vaping status: Never Used   Substance and Sexual Activity    Alcohol use: No    Drug use: Never                   Review of Systems no cough.  No sputum production.  No nausea.  No vomiting.  No abdominal pain.    Vital Signs:  /54 (BP Location: Left arm, Patient Position: Lying)   Pulse 81   Temp 99.5 °F (37.5 °C) (Oral)   Resp 16   Ht 167.6 cm (66\")   Wt 93.5 kg (206 lb 2.1 oz)   LMP  (LMP Unknown)   SpO2 91%   BMI 33.27 kg/m²  Temp (24hrs), Av.3 °F (36.8 °C), Min:97.6 °F (36.4 °C), Max:99.5 °F (37.5 °C)    Physical Exam  Vital signs - reviewed.  Line/IV site - No erythema or tenderness.  Lungs without crackles  Heart distant heart sounds without systolic or diastolic murmur  Abdomen is soft and nontender  Right hip area without any open or draining area  Extremities edematous  Skin without rash    Lab Results:  CBC:   Results from last 7 days   Lab Units 24  0537 24  1051   WBC 10*3/mm3 8.09 6.65   HEMOGLOBIN g/dL 7.5* 8.2*   HEMATOCRIT % 24.6* 26.7*   PLATELETS 10*3/mm3 114* 115*     CMP: "   Results from last 7 days   Lab Units 05/25/24  0537 05/24/24  1051   SODIUM mmol/L 141 136   POTASSIUM mmol/L 3.5 3.6   CHLORIDE mmol/L 103 103   CO2 mmol/L 27.0 23.0   BUN mg/dL 22 15   CREATININE mg/dL 1.28* 1.08*   CALCIUM mg/dL 8.5* 8.6   BILIRUBIN mg/dL 0.5 0.7   ALK PHOS U/L 62 66   ALT (SGPT) U/L 26 30   AST (SGOT) U/L 26 39*   GLUCOSE mg/dL 106* 105*     Urinalysis on May 24, 2024:  0-2 red blood cells per high-power field  To numerous to count white blood cells per high-power field  2+ bacteria    Culture results:  Urine culture May 24, 2024:  Greater than 100,000 colony-forming units gram-negative bacilli    Blood cultures May 24, 2024-gram-negative bacilli from aerobic bottle-susceptibility pending  (Molecular identification Proteus)  Blood cultures May 24, 2024-no growth    Blood cultures May 2, 2024-Enterobacter cloacae-carbapenem resistant    Radiology:     CT angiogram of the chest:  IMPRESSION:  1. Small bilateral pleural fluid and small layering groundglass opacity  at the fissures, could represent small pulmonary edema/fluid or  atelectasis.  2. Gallstone at the proximal gallbladder neck or cystic duct. Partially  visualized gallbladder appears distended, not optimally evaluated on  this exam. Correlate with patient symptoms and consider dedicated  ultrasound.  3. Diffuse esophageal thickening of the mid and distal esophagus. This  is new compared to 4/22/2022. Recommend referral to gastroenterology for  consideration of direct visualization.     This report was signed and finalized on 5/24/2024 1:29 PM by Dr Paulina Tavares MD.    Right upper quadrant ultrasound:  IMPRESSION:  1. Gallstone with no sign of cholecystitis.    Additional Studies Reviewed:     Impression:   1.  Gram-negative bacteremia-suspect urinary source-Proteus on molecular identification  2.  Recent bacteremia with Enterobacter (Carbapenem resistant)  3.  Admission in April of this year with E. coli bacteremia  4.  Remote  history of MSSA bacteremia    Recommendations:    Continue treatment cefepime  This appears to be her third admission with gram-negative bacteremia within the past few months (E. coli in April, Enterobacter in early May, and now Proteus).  Going to review prior workup.  May need additional studies to explore reason for frequent recurrent bloodstream infection with gram-negative bacilli.  Continue to follow.    Elie Ohara MD  05/25/24  13:25 CDT

## 2024-05-25 NOTE — PROGRESS NOTES
Patient is feeling better today.  Less shortness of breath.  Overall more energetic.  Complains of diffuse aches all over the body.  No localized abdominal pain, and specifically no right upper quadrant pain.  No nausea or vomiting.  White blood cell count remains normal at 8000.  Mild thrombocytopenia with platelet count of 114,000, not significantly changed from yesterday.  Hemoglobin is down to 7.5 and quite unremarkable differential.  CMP this morning remarkable for mild elevated creatinine of 1.28 and mildly low calcium of 8.5 and albumin of 2.8.  Otherwise liver function tests are normal.    On a physical exam: 68-year-old woman who appears to be no acute distress.  Awake, alert and oriented x 3.   Vitals:    05/25/24 1102   BP: 110/54   Pulse: 81   Resp: 16   Temp: 99.5 °F (37.5 °C)   SpO2: 91%   Nonicteric sclera and moist oral mucosa.  Abdomen is obese, soft, nontender to palpation, with normoactive bowel sounds.    Labs:    Latest Reference Range & Units 05/24/24 10:51 05/25/24 05:37   WBC 3.40 - 10.80 10*3/mm3 6.65 8.09   RBC 3.77 - 5.28 10*6/mm3 2.82 (L) 2.62 (L)   Hemoglobin 12.0 - 15.9 g/dL 8.2 (L) 7.5 (L)   Hematocrit 34.0 - 46.6 % 26.7 (L) 24.6 (L)   Platelets 140 - 450 10*3/mm3 115 (L) 114 (L)   RDW 12.3 - 15.4 % 17.7 (H) 17.6 (H)   MCV 79.0 - 97.0 fL 94.7 93.9   MCH 26.6 - 33.0 pg 29.1 28.6   MCHC 31.5 - 35.7 g/dL 30.7 (L) 30.5 (L)   MPV 6.0 - 12.0 fL 11.4 10.8   RDW-SD 37.0 - 54.0 fl 60.5 (H) 59.8 (H)      Latest Reference Range & Units Most Recent 05/24/24 10:51   Sodium 136 - 145 mmol/L 141  5/25/24 05:37 136   Potassium 3.5 - 5.2 mmol/L 3.5  5/25/24 05:37 3.6   Chloride 98 - 107 mmol/L 103  5/25/24 05:37 103   CO2 22.0 - 29.0 mmol/L 27.0  5/25/24 05:37 23.0   CO2 22 - 29 mmol/L 24 (E)  9/3/19 08:17    Anion Gap 5.0 - 15.0 mmol/L 11.0  5/25/24 05:37 10.0   BUN 8 - 23 mg/dL 22  5/25/24 05:37 15   Creatinine 0.57 - 1.00 mg/dL 1.28 (H)  5/25/24 05:37 1.08 (H)   BUN/Creatinine Ratio 7.0 - 25.0   17.2  5/25/24 05:37 13.9   eGFR >60.0 mL/min/1.73 45.2 (L)  5/25/24 05:37 55.4 (L)   EGFR Result >60.0 mL/min/1.73 50.8 (L)  5/16/24 12:56    Est GFR by Clearance ml/min/1.732 91  3/21/16 10:00    Glucose 65 - 99 mg/dL 106 (H)  5/25/24 05:37 105 (H)   Calcium 8.6 - 10.5 mg/dL 8.5 (L)  5/25/24 05:37 8.6   Magnesium 1.6 - 2.4 mg/dL 1.7  5/25/24 05:37 1.5 (L)   Phosphorus 2.5 - 4.5 mg/dL 4.0  4/23/22 02:35    Alkaline Phosphatase 39 - 117 U/L 62  5/25/24 05:37 66   Total Protein 6.0 - 8.5 g/dL 6.0  5/25/24 05:37 6.0   Albumin 3.5 - 5.2 g/dL 2.8 (L)  5/25/24 05:37 2.9 (L)   Globulin gm/dL 3.2  5/25/24 05:37 3.1   A/G Ratio g/dL 0.9  5/25/24 05:37 0.9   AST (SGOT) 1 - 32 U/L 26  5/25/24 05:37 39 (H)   ALT (SGPT) 1 - 33 U/L 26  5/25/24 05:37 30   Total Bilirubin 0.0 - 1.2 mg/dL 0.5  5/25/24 05:37 0.7     A/P: 68-year-old woman who was admitted with progressive fatigue, weakness, shortness of breath in the setting of chronic normocytic anemia.  By CT angiography and upper abdominal ultrasound patient was found to have solitary 9 mm stone in the gallbladder but without signs of cholecystitis.  No intervention at this point is necessary as long as patient remains asymptomatic.  Patient also has been experiencing progressive dysphagia for solids and liquids over the last couple months and CT of the chest revealed thickening of the mid and distal esophagus.  Patient should proceed with an upper endoscopy during this hospitalization for further evaluation of her symptoms and abnormalities on CT.  Will follow.

## 2024-05-25 NOTE — PLAN OF CARE
Goal Outcome Evaluation:  Plan of Care Reviewed With: patient, caregiver, sibling (SARAH Wheeler)        Progress: no change (Initial Evaluation)         Anticipated Discharge Disposition (SLP): unknown          SLP Swallowing Diagnosis: swallow WFL/no suspected pharyngeal impairment, suspected esophageal dysphagia (05/25/24 0802)           SPEECH-LANGUAGE PATHOLOGY EVALUATION - SWALLOW  Subjective: The patient was seen on this date for a Clinical Swallow evaluation.  Patient was alert and cooperative. Sister and personal caregiver was present.   Significant history: Presented due to generalized weakness and shortness of breath. Incidental finding on CT of thickened esophagus. GI consult with complaint of dysphagia with solids and liquids over the last few months. Reports occasional regurgitation. GI plans for upper endo at the first of the week.   Objective: Oral motor examination results: WFL.  Textures given during assessment of swallow function included thin liquid and regular consistency.  Assessment: Difficulties were noted with none of the above consistencies.  Observations: Patient reports preference of soft to chew foods with chopped meats. She has Sjogren and reports dry mouth at baseline. She prefers moist food items. From an oral strength standpoint she is able to masticate solids. From on esophageal standpoint she will likely tolerate softer more moist food items.   SLP Findings:  Patient presents with functional swallow, with suspected esophageal component.   Recommendations: Diet Textures: Soft to chew diet with chopped meats and thin liquids.  Medications should be taken whole with thin liquids or as tolerated.   Recommended Strategies: upright for PO, small bites and sips, and alternate liquids and solids. Oral care 2x a day.  Other Recommended Evaluations: Continue follow up with GI for management of suspected esophageal assessment.     Dysphagia therapy is not recommended.

## 2024-05-25 NOTE — PLAN OF CARE
Goal Outcome Evaluation:  Plan of Care Reviewed With: patient        Progress: no change  Outcome Evaluation: Pt c/o pain x1, requested PRN tylenol which provided adequate relief per patient report; turned as pt tolerates; IID except IV abx; pills given whole in applesauce; safety maintained; family/sitters at bedside.

## 2024-05-25 NOTE — THERAPY DISCHARGE NOTE
Acute Care - Speech Language Pathology   Swallow Initial Evaluation/Discharge [facility]     Patient Name: Tawny Shin  : 1955  MRN: [id number]  Today's Date: 2024               Admit Date: 2024  SPEECH-LANGUAGE PATHOLOGY EVALUATION - SWALLOW  Subjective: The patient was seen on this date for a Clinical Swallow evaluation.  Patient was alert and cooperative. Sister and personal caregiver was present.   Significant history: Presented due to generalized weakness and shortness of breath. Incidental finding on CT of thickened esophagus. GI consult with complaint of dysphagia with solids and liquids over the last few months. Reports occasional regurgitation. GI plans for upper endo at the first of the week.   Objective: Oral motor examination results: WFL.  Textures given during assessment of swallow function included thin liquid and regular consistency.  Assessment: Difficulties were noted with none of the above consistencies.  Observations: Patient reports preference of soft to chew foods with chopped meats. She has Sjogren and reports dry mouth at baseline. She prefers moist food items. From an oral strength standpoint she is able to masticate solids. From on esophageal standpoint she will likely tolerate softer more moist food items.   SLP Findings:  Patient presents with functional swallow, with suspected esophageal component.   Recommendations: Diet Textures: Soft to chew diet with chopped meats and thin liquids.  Medications should be taken whole with thin liquids or as tolerated.   Recommended Strategies: upright for PO, small bites and sips, and alternate liquids and solids. Oral care 2x a day.  Other Recommended Evaluations: Continue follow up with GI for management of suspected esophageal assessment.     Dysphagia therapy is not recommended.      Nicole Amin MS CCC-SLP 2024 10:17 CDT    Visit Dx:    ICD-10-CM ICD-9-CM   1. Hypervolemia, unspecified hypervolemia type  E87.70  276.69   2. Debility  R53.81 799.3   3. Esophageal dysphagia  R13.19 787.29     Patient Active Problem List   Diagnosis    T12 compression fracture, initial encounter    Chronic embolism and thrombosis of unspecified deep veins of left lower extremity    Chronic anticoagulation    Iron deficiency anemia    Osteoporosis    E coli bacteremia    Epidural hematoma    Pleural effusion, left    Functional neurological symptom disorder with weakness or paralysis    Overweight (BMI 25.0-29.9)    Rheumatoid arthritis involving multiple sites    (HFpEF) heart failure with preserved ejection fraction    Venous insufficiency    Coronary artery disease involving native coronary artery of native heart without angina pectoris    Sick sinus syndrome    Essential hypertension    Pressure injury of skin of heel    Chronic pain    Anemia of chronic disease    Cholelithiasis    Pressure injury of skin of buttock    Near functional paraplegia    Skin cancer    Squamous cell carcinoma of back    Hematoma    Right-sided chest wall pain    Hyperlipidemia LDL goal <70    Malodorous urine    Stage 3b chronic kidney disease    Cyst of buttocks    Sepsis due to Escherichia coli without acute organ dysfunction    Generalized weakness    Paralysis    History of urinary retention    Bacteremia due to Enterobacter species    Bacteremia    Hypothyroidism    Fluid overload     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    Elevated  "cholesterol     Eustachian tube dysfunction     Heart disease     Herpes simplex     History of transfusion     Hyperlipidemia     Hypertension     Hypothyroidism 12/23/2020    Intrinsic asthma 12/23/2020    Knee dislocation     Labral tear of right hip joint     Laryngitis sicca     Laryngitis, chronic     Left carotid bruit 03/09/2016    MI (myocardial infarction)     Myalgia due to statin 06/25/2019    Open wound of right hip 09/14/2021    Osteomyelitis of right femur 07/06/2021    Otorrhea     Pacemaker 11/17/2016    Primary osteoarthritis of left knee 12/23/2020    Psoriasis vulgaris 12/23/2020    S/P coronary artery stent placement 03/09/2016    Sensorineural hearing loss     Seropositive rheumatoid arthritis of multiple sites 12/27/2019    Overview:  -myochrysine '93-'96 -methotrexate '96--->11/98;r/s  restarted 2/99--> 8/14 (anemia) -sulfasalazine- not effective -penicillamine 6/98-->10/98; no effect -leflunomide 11/98--> - Humira '13-->didn't take - Enbrel 12/14-->3/15- no effect!   Last Assessment & Plan:  - \"aching all over\" because she had to be off her anti-rheumatic drugs for 2 weeks in preparation for her R knee surgery - he    Sick sinus syndrome 12/27/2019    Sjogren's disease     Spondylolisthesis of lumbar region 01/17/2018    Syncope, recurrent 02/08/2021    Urinary tract infection     UTI (urinary tract infection) 04/19/2024     Past Surgical History:   Procedure Laterality Date    A-V CARDIAC PACEMAKER INSERTION  2016    ATRIAL CARDIAC PACEMAKER INSERTION      CARDIAC CATHETERIZATION      CATARACT EXTRACTION      CERVICAL CORPECTOMY N/A 3/3/2021    Procedure: CERVICAL 6 CORPECTOMY WITH TITANIUM CAGE WITH NEURO MONITORING;  Surgeon: Bandar Shea MD;  Location: [facility];  Service: Neurosurgery;  Laterality: N/A;    COLONOSCOPY  11/08/2011    One fold in the ascending colon which showed ulcer otherwise normal exam    COLONOSCOPY  11/12/2004    Normal exam repeat in five years    " CORONARY ANGIOPLASTY WITH STENT PLACEMENT      X 2; 2013 & 2014    ENDOSCOPY  07/10/2014    Normal exam    FLAP LEG Right 9/14/2021    Procedure: RIGHT GLUTEAL FASCIOCUTANEOUS ADVANCEMENT FLAP AND RIGHT TENSOR FASCIAL JESSICA FLAP;  Surgeon: Amadeo Turner MD;  Location:  [facility];  Service: Plastics;  Laterality: Right;    HIP ABDUCTION TENOTOMY BILATERAL Right 1/14/2021    Procedure: RIGHT HIP GLUTEUS MEDLUS / MINIMUS REPAIR, POSSIBLE ACHILLES ALLOGRAFT;  Surgeon: Nino Carlson MD;  Location:  [facility];  Service: Orthopedics;  Laterality: Right;    INCISION AND DRAINAGE ABSCESS Right 6/4/2022    Procedure: INCISION AND DRAINAGE ABSCESS right hip;  Surgeon: Magda Salcido MD;  Location:  [facility];  Service: General;  Laterality: Right;    INCISION AND DRAINAGE ABSCESS Right 6/10/2022    Procedure: RIGHT HIP INCISION AND DRAINAGE. MD NEEDS 3L VANC IRRIGATION, CURRETTES, DAICANS, KERLEX ROLLS;  Surgeon: Amadeo Turner MD;  Location:  [facility];  Service: Plastics;  Laterality: Right;    INCISION AND DRAINAGE HIP Right 2/9/2021    Procedure: HIP INCISION AND DRAINAGE;  Surgeon: Nino Carlson MD;  Location:  [facility];  Service: Orthopedics;  Laterality: Right;    INCISION AND DRAINAGE LEG Right 10/24/2021    Procedure: INCISION AND DRAINAGE LOWER EXTREMITY;  Surgeon: Amadeo Turner MD;  Location:  [facility];  Service: Plastics;  Laterality: Right;    INCISION AND DRAINAGE OF WOUND Right 7/8/2021    Procedure: INCISION AND DRAINAGE WOUND RIGHT HIP;  Surgeon: James Huntley MD;  Location:  [facility];  Service: Orthopedics;  Laterality: Right;    JOINT REPLACEMENT      KYPHOPLASTY WITH BIOPSY Bilateral 10/26/2021    Procedure: THOARCIC 12 KYPHOPLASTY WITH BIOPSY;  Surgeon: Bandar Shea MD;  Location:  [facility];  Service: Neurosurgery;  Laterality: Bilateral;    LEG DEBRIDEMENT Right 9/14/2021    Procedure: DEBRIDEMENT OF RIGHT HIP WOUND, RIGHT GLUTEAL FASCIOCUTANEOUS ADVANCEMENT FLAP AND RIGHT  TENSOR FASCIAL JESSICA FLAP;  Surgeon: Amadeo Turner MD;  Location:  [facility];  Service: Plastics;  Laterality: Right;    LUMBAR DISCECTOMY Right 3/23/2021    Procedure: LUMBAR DISCECTOMY MICRO, Lumbar 1/2 right;  Surgeon: Bandar Shea MD;  Location:  [facility];  Service: Neurosurgery;  Laterality: Right;    LUMBAR FUSION N/A 1/19/2018    Procedure: L3-4,L4-5 DECOMPRESSION, POSTERIOR SPINAL FUSION WITH INSTRUMENTATION;  Surgeon: Fortino Oropeza MD;  Location:  [facility];  Service:     LUMBAR LAMINECTOMY WITH FUSION Left 1/17/2018    Procedure: LEFT L3-4 L4-5 LATERAL LUMBAR INTERBODY FUSION;  Surgeon: Fortino Oropeza MD;  Location:  [facility];  Service:     MASS EXCISION Right 4/23/2024    Procedure: RIGHT BUTTOCK MASS EXCISION;  Surgeon: Moises Keyes MD;  Location:  [facility];  Service: General;  Laterality: Right;    MYRINGOTOMY W/ TUBES  09/04/2014    TUBES NO LONGER IN PLACE    OTHER SURGICAL HISTORY      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 so hardware was removed and spacers were placed    REPLACEMENT TOTAL KNEE Right        SLP Recommendation and Plan  SLP Swallowing Diagnosis: swallow WFL/no suspected pharyngeal impairment, suspected esophageal dysphagia (05/25/24 0802)  SLP Diet Recommendation: soft to chew textures, chopped, thin liquids (05/25/24 0802)     Monitor for Signs of Aspiration: yes, notify SLP if any concerns (05/25/24 0802)  Recommended Diagnostics: No further SLP services recommended (05/25/24 0802)  Swallow Criteria for Skilled Therapeutic Interventions Met: current level of function same as previous level of function, baseline status (05/25/24 0802)  Anticipated Discharge Disposition (SLP): unknown (05/25/24 1013)  Rehab Potential/Prognosis, Swallowing: good, to achieve stated therapy goals (05/25/24 0802)  Therapy Frequency (Swallow): evaluation only (05/25/24 0802)  Predicted Duration Therapy Intervention (Days): until discharge (05/25/24 0802)  Demonstrates Need for Referral to  Another Service: gastroenterology, dedicated esophageal assessment (continue follow up) (05/25/24 0802)        Anticipated Discharge Disposition (SLP): unknown (05/25/24 1013)     Demonstrates Need for Referral to Another Service: gastroenterology, dedicated esophageal assessment (continue follow up) (05/25/24 0802)  Swallowing Considerations per Physician Discretion: medical management of suspected esophageal dysphagia, as indicated (05/25/24 0802)  Reason for Discharge: all goals and outcomes met, no further needs identified (05/25/24 1013)                Plan of Care Reviewed With: patient, caregiver, sibling (SARAH Wheeler) (05/25/24 1006)  Progress: no change (Initial Evaluation) (05/25/24 1006)    SWALLOW EVALUATION (Last 72 Hours)       SLP Adult Swallow Evaluation       Row Name 05/25/24 0802                   Rehab Evaluation    Document Type evaluation  -MG        Subjective Information no complaints  -MG        Patient Observations alert;agree to therapy;cooperative  -MG        Patient/Family/Caregiver Comments/Observations Sister and personal caregiver present.  -MG        Patient Effort good  -MG        Symptoms Noted During/After Treatment none  -MG           General Information    Patient Profile Reviewed yes  -MG        Pertinent History Of Current Problem Presented due to generalized weakness and shortness of breath. Incidental finding on CT of thickened esophagus. GI consult with complaint of dysphagia with solids and liquids over the last few months. Reports occasional regurgitation. GI plans for upper endo at the first of the week.  -MG        Current Method of Nutrition regular textures;thin liquids  -MG        Precautions/Limitations, Vision WFL;for purposes of eval  -MG        Precautions/Limitations, Hearing hearing impairment, bilaterally  Pt reports missing her hearing aids. Family aware. We looked and found 1 of them behind her in the bed.  -MG        Prior Level of Function-Communication WFL   -MG        Prior Level of Function-Swallowing no diet consistency restrictions  -MG        Plans/Goals Discussed with patient and family;other (see comments);agreed upon  RN Liam  -MG        Barriers to Rehab none identified  -MG        Patient's Goals for Discharge return to all previous roles/activities  -MG        Family Goals for Discharge patient able to return to all previous activities/roles  -MG           Pain    Additional Documentation Pain Scale: FACES Pre/Post-Treatment (Group)  -MG           Pain Scale: FACES Pre/Post-Treatment    Pain: FACES Scale, Pretreatment 0-->no hurt  -MG        Posttreatment Pain Rating 0-->no hurt  -MG           Oral Motor Structure and Function    Dentition Assessment natural, present and adequate  -MG        Secretion Management WNL/WFL  -MG        Mucosal Quality moist, healthy  -MG        Volitional Swallow WFL  -MG        Volitional Cough WFL  -MG           Oral Musculature and Cranial Nerve Assessment    Oral Motor General Assessment WFL  -MG           General Eating/Swallowing Observations    Respiratory Support Currently in Use room air  -MG        Eating/Swallowing Skills self-fed  -MG        Positioning During Eating upright in bed  -MG        Utensils Used straw  -MG        Consistencies Trialed regular textures;thin liquids  -MG           Clinical Swallow Eval    Oral Prep Phase WFL  -MG        Oral Transit WFL  -MG        Oral Residue WFL  -MG        Pharyngeal Phase no overt signs/symptoms of pharyngeal impairment  -MG        Esophageal Phase suspected esophageal impairment  -MG        Clinical Swallow Evaluation Summary See note  -MG           Esophageal Phase Concerns    Esophageal Phase Concerns other (see comments)  reported. See note.  -MG           SLP Evaluation Clinical Impression    SLP Swallowing Diagnosis swallow WFL/no suspected pharyngeal impairment;suspected esophageal dysphagia  -MG        Functional Impact no impact on function  -MG        Rehab  Potential/Prognosis, Swallowing good, to achieve stated therapy goals  -MG        Swallow Criteria for Skilled Therapeutic Interventions Met current level of function same as previous level of function;baseline status  -MG           Recommendations    Therapy Frequency (Swallow) evaluation only  -MG        Predicted Duration Therapy Intervention (Days) until discharge  -MG        SLP Diet Recommendation soft to chew textures;chopped;thin liquids  -MG        Recommended Diagnostics No further SLP services recommended  -MG        Recommended Precautions and Strategies upright posture during/after eating;small bites of food and sips of liquid;alternate between small bites of food and sips of liquid;general aspiration precautions;reflux precautions  -MG        Oral Care Recommendations Oral Care BID/PRN;Toothbrush  -MG        SLP Rec. for Method of Medication Administration meds whole;as tolerated  -MG        Monitor for Signs of Aspiration yes;notify SLP if any concerns  -MG        Anticipated Discharge Disposition (SLP) unknown  -MG        Demonstrates Need for Referral to Another Service gastroenterology;dedicated esophageal assessment  continue follow up  -MG        Swallowing Considerations per Physician Discretion medical management of suspected esophageal dysphagia, as indicated  -MG                  User Key  (r) = Recorded By, (t) = Taken By, (c) = Cosigned By      Initials Name Effective Dates    MG Nicole Amin, MS [facility]-SLP 07/11/23 -                     EDUCATION  The patient has been educated in the following areas:   Dysphagia (Swallowing Impairment) Oral Care/Hydration Modified Diet Instruction.                   Time Calculation:    Time Calculation- SLP       Row Name 05/25/24 1013             Time Calculation- SLP    SLP Start Time 0802  -MG      SLP Stop Time 0840  -MG      SLP Time Calculation (min) 38 min  -MG      SLP Received On 05/25/24  -MG         Untimed Charges    SLP Eval/Re-eval  ST  Eval Oral Pharyng Swallow - 43171  -MG      [id number] Eval Oral Pharyng Swallow Minutes 38  -MG         Total Minutes    Untimed Charges Total Minutes 38  -MG       Total Minutes 38  -MG                User Key  (r) = Recorded By, (t) = Taken By, (c) = Cosigned By      Initials Name Provider Type    Nicole Gee, MS CCC-SLP Speech and Language Pathologist                    Therapy Charges for Today       Code Description Service Date Service Provider Modifiers Qty    [id number]  ST EVAL ORAL PHARYNG SWALLOW 3 5/25/2024 Nicole Amin MS CCC-SLP GN 1                 SLP Discharge Summary  Anticipated Discharge Disposition (SLP): unknown  Reason for Discharge: all goals and outcomes met, no further needs identified  Progress Toward Achieving Short/long Term Goals: all goals met within established timelines  Discharge Destination: other (see comments) (Remains in acute care)    Nicole Amin MS CCC-SLP  5/25/2024

## 2024-05-26 LAB
ANION GAP SERPL CALCULATED.3IONS-SCNC: 12 MMOL/L (ref 5–15)
BACTERIA SPEC AEROBE CULT: ABNORMAL
BUN SERPL-MCNC: 29 MG/DL (ref 8–23)
BUN/CREAT SERPL: 19.1 (ref 7–25)
CALCIUM SPEC-SCNC: 7.9 MG/DL (ref 8.6–10.5)
CHLORIDE SERPL-SCNC: 102 MMOL/L (ref 98–107)
CO2 SERPL-SCNC: 26 MMOL/L (ref 22–29)
CREAT SERPL-MCNC: 1.52 MG/DL (ref 0.57–1)
DEPRECATED RDW RBC AUTO: 59.9 FL (ref 37–54)
EGFRCR SERPLBLD CKD-EPI 2021: 36.7 ML/MIN/1.73
ERYTHROCYTE [DISTWIDTH] IN BLOOD BY AUTOMATED COUNT: 17.4 % (ref 12.3–15.4)
FERRITIN SERPL-MCNC: 489.9 NG/ML (ref 13–150)
GLUCOSE SERPL-MCNC: 90 MG/DL (ref 65–99)
HCT VFR BLD AUTO: 25.1 % (ref 34–46.6)
HGB BLD-MCNC: 7.7 G/DL (ref 12–15.9)
IRON 24H UR-MRATE: 35 MCG/DL (ref 37–145)
IRON SATN MFR SERPL: 20 % (ref 20–50)
MAGNESIUM SERPL-MCNC: 1.8 MG/DL (ref 1.6–2.4)
MCH RBC QN AUTO: 28.9 PG (ref 26.6–33)
MCHC RBC AUTO-ENTMCNC: 30.7 G/DL (ref 31.5–35.7)
MCV RBC AUTO: 94.4 FL (ref 79–97)
PLATELET # BLD AUTO: 127 10*3/MM3 (ref 140–450)
PMV BLD AUTO: 11.6 FL (ref 6–12)
POTASSIUM SERPL-SCNC: 3 MMOL/L (ref 3.5–5.2)
RBC # BLD AUTO: 2.66 10*6/MM3 (ref 3.77–5.28)
SODIUM SERPL-SCNC: 140 MMOL/L (ref 136–145)
TIBC SERPL-MCNC: 179 MCG/DL (ref 298–536)
TRANSFERRIN SERPL-MCNC: 120 MG/DL (ref 200–360)
VIT B12 BLD-MCNC: >2000 PG/ML (ref 211–946)
WBC NRBC COR # BLD AUTO: 6.77 10*3/MM3 (ref 3.4–10.8)

## 2024-05-26 PROCEDURE — 80048 BASIC METABOLIC PNL TOTAL CA: CPT | Performed by: FAMILY MEDICINE

## 2024-05-26 PROCEDURE — 82607 VITAMIN B-12: CPT | Performed by: FAMILY MEDICINE

## 2024-05-26 PROCEDURE — 83735 ASSAY OF MAGNESIUM: CPT | Performed by: FAMILY MEDICINE

## 2024-05-26 PROCEDURE — 97161 PT EVAL LOW COMPLEX 20 MIN: CPT | Performed by: PHYSICAL THERAPIST

## 2024-05-26 PROCEDURE — 84466 ASSAY OF TRANSFERRIN: CPT | Performed by: FAMILY MEDICINE

## 2024-05-26 PROCEDURE — 85027 COMPLETE CBC AUTOMATED: CPT | Performed by: FAMILY MEDICINE

## 2024-05-26 PROCEDURE — 25010000002 CEFEPIME PER 500 MG: Performed by: FAMILY MEDICINE

## 2024-05-26 PROCEDURE — 63710000001 PREDNISONE PER 5 MG: Performed by: FAMILY MEDICINE

## 2024-05-26 PROCEDURE — 97165 OT EVAL LOW COMPLEX 30 MIN: CPT

## 2024-05-26 PROCEDURE — 82728 ASSAY OF FERRITIN: CPT | Performed by: FAMILY MEDICINE

## 2024-05-26 PROCEDURE — 83540 ASSAY OF IRON: CPT | Performed by: FAMILY MEDICINE

## 2024-05-26 PROCEDURE — 99232 SBSQ HOSP IP/OBS MODERATE 35: CPT | Performed by: INTERNAL MEDICINE

## 2024-05-26 RX ORDER — POTASSIUM CHLORIDE 750 MG/1
40 CAPSULE, EXTENDED RELEASE ORAL ONCE
Status: COMPLETED | OUTPATIENT
Start: 2024-05-26 | End: 2024-05-26

## 2024-05-26 RX ORDER — FAMOTIDINE 10 MG/ML
20 INJECTION, SOLUTION INTRAVENOUS DAILY
Status: DISCONTINUED | OUTPATIENT
Start: 2024-05-27 | End: 2024-05-28

## 2024-05-26 RX ADMIN — Medication 1 TABLET: at 08:26

## 2024-05-26 RX ADMIN — CARVEDILOL 25 MG: 25 TABLET, FILM COATED ORAL at 08:26

## 2024-05-26 RX ADMIN — PREDNISONE 5 MG: 5 TABLET ORAL at 08:25

## 2024-05-26 RX ADMIN — TRAMADOL HYDROCHLORIDE 50 MG: 50 TABLET ORAL at 20:24

## 2024-05-26 RX ADMIN — DULOXETINE HYDROCHLORIDE 30 MG: 30 CAPSULE, DELAYED RELEASE ORAL at 08:26

## 2024-05-26 RX ADMIN — POTASSIUM CHLORIDE 40 MEQ: 750 CAPSULE, EXTENDED RELEASE ORAL at 12:36

## 2024-05-26 RX ADMIN — FAMOTIDINE 20 MG: 10 INJECTION INTRAVENOUS at 08:25

## 2024-05-26 RX ADMIN — DIPHENHYDRAMINE HYDROCHLORIDE AND LIDOCAINE HYDROCHLORIDE AND ALUMINUM HYDROXIDE AND MAGNESIUM HYDRO 5 ML: KIT at 16:30

## 2024-05-26 RX ADMIN — CEFEPIME 2000 MG: 2 INJECTION, POWDER, FOR SOLUTION INTRAVENOUS at 03:42

## 2024-05-26 RX ADMIN — OXYCODONE HYDROCHLORIDE AND ACETAMINOPHEN 250 MG: 500 TABLET ORAL at 08:27

## 2024-05-26 RX ADMIN — LOSARTAN POTASSIUM 50 MG: 50 TABLET, FILM COATED ORAL at 08:25

## 2024-05-26 RX ADMIN — DIPHENHYDRAMINE HYDROCHLORIDE AND LIDOCAINE HYDROCHLORIDE AND ALUMINUM HYDROXIDE AND MAGNESIUM HYDRO 5 ML: KIT at 03:42

## 2024-05-26 RX ADMIN — FERROUS SULFATE TAB 325 MG (65 MG ELEMENTAL FE) 325 MG: 325 (65 FE) TAB at 08:27

## 2024-05-26 RX ADMIN — ISOSORBIDE MONONITRATE 60 MG: 60 TABLET, EXTENDED RELEASE ORAL at 08:26

## 2024-05-26 RX ADMIN — APIXABAN 5 MG: 5 TABLET, FILM COATED ORAL at 08:27

## 2024-05-26 RX ADMIN — ASPIRIN 81 MG: 81 TABLET, COATED ORAL at 08:27

## 2024-05-26 RX ADMIN — LIDOCAINE 2 PATCH: 4 PATCH TOPICAL at 08:25

## 2024-05-26 RX ADMIN — CEFEPIME 2000 MG: 2 INJECTION, POWDER, FOR SOLUTION INTRAVENOUS at 15:01

## 2024-05-26 RX ADMIN — Medication 10 ML: at 08:28

## 2024-05-26 RX ADMIN — DIPHENHYDRAMINE HYDROCHLORIDE AND LIDOCAINE HYDROCHLORIDE AND ALUMINUM HYDROXIDE AND MAGNESIUM HYDRO 5 ML: KIT at 10:08

## 2024-05-26 RX ADMIN — FOLIC ACID 1000 MCG: 1 TABLET ORAL at 08:26

## 2024-05-26 RX ADMIN — LEFLUNOMIDE 20 MG: 20 TABLET ORAL at 08:26

## 2024-05-26 RX ADMIN — Medication 10 ML: at 20:15

## 2024-05-26 RX ADMIN — DIPHENHYDRAMINE HYDROCHLORIDE AND LIDOCAINE HYDROCHLORIDE AND ALUMINUM HYDROXIDE AND MAGNESIUM HYDRO 5 ML: KIT at 20:15

## 2024-05-26 RX ADMIN — LEVOTHYROXINE SODIUM 75 MCG: 0.07 TABLET ORAL at 06:02

## 2024-05-26 NOTE — PROGRESS NOTES
Infectious Diseases Progress Note    Patient:  Tawny Shin  YOB: 1955  MRN: [id number]   Admit date: 5/24/2024   Admitting Physician: Saulo Ambriz MD  Primary Care Physician: Mioses Oliveira MD    Chief Complaint/Interval History: She seems to be feeling better.  She was little brighter today.  She was little stronger appearing today.  Family and caregivers at bedside.  She was more alert, stronger appearing, and interactive.  She is without fever overnight.  Reviewed her most recent culture results with her.  Reviewed with her the previous resistant Enterobacter that was grown earlier this month.  Explained she should be on contact precautions.    Intake/Output Summary (Last 24 hours) at 5/26/2024 1042  Last data filed at 5/26/2024 0552  Gross per 24 hour   Intake 720 ml   Output 700 ml   Net 20 ml     Allergies:   Allergies   Allergen Reactions    Atorvastatin Other (See Comments)     LEG CRAMPS      Amoxicillin Rash    Escitalopram Rash    Nabumetone Rash    Niacin Er Rash    Penicillin G Rash    Penicillins Rash    Simvastatin Rash     Current Scheduled Medications:   apixaban, 5 mg, Oral, BID  ascorbic acid, 250 mg, Oral, Daily  aspirin, 81 mg, Oral, Daily  carvedilol, 25 mg, Oral, BID With Meals  cefepime, 2,000 mg, Intravenous, Q12H  DULoxetine, 30 mg, Oral, Daily  famotidine, 20 mg, Intravenous, BID  ferrous sulfate, 325 mg, Oral, Daily With Breakfast  First Mouthwash (Magic Mouthwash), 5 mL, Swish & Spit, Q6H  folic acid, 1,000 mcg, Oral, Daily  isosorbide mononitrate, 60 mg, Oral, Daily  leflunomide, 20 mg, Oral, Daily  levothyroxine, 75 mcg, Oral, Q AM  Lidocaine, 2 patch, Transdermal, Q24H  losartan, 50 mg, Oral, Daily  multivitamin with minerals, 1 tablet, Oral, Daily  predniSONE, 5 mg, Oral, Daily  sodium chloride, 10 mL, Intravenous, Q12H          Current PRN Medications:    acetaminophen    albuterol    Biotene dry mouth    senna-docusate sodium **AND** polyethylene glycol  "**AND** bisacodyl **AND** bisacodyl    Diclofenac Sodium    magnesium hydroxide    nitroglycerin    ondansetron    sodium chloride    sodium chloride    sodium chloride    traMADol    valACYclovir    Review of Systems see HPI.    Vital Signs:  Temp (24hrs), Av.3 °F (36.8 °C), Min:97.7 °F (36.5 °C), Max:99.5 °F (37.5 °C)    /58 (BP Location: Left arm, Patient Position: Lying)   Pulse 76   Temp 98.4 °F (36.9 °C)   Resp 16   Ht 167.6 cm (66\")   Wt 96.1 kg (211 lb 13.8 oz)   LMP  (LMP Unknown)   SpO2 93%   BMI 34.20 kg/m²     Physical Exam  Vital signs - reviewed.  Line/IV site - No erythema, warmth, induration, or tenderness.  Alert, pleasant, no distress  Does not appear to have any new findings on exam    Lab Results:  CBC:   Results from last 7 days   Lab Units 24  0419 24  0537 24  1051   WBC 10*3/mm3 6.77 8.09 6.65   HEMOGLOBIN g/dL 7.7* 7.5* 8.2*   HEMATOCRIT % 25.1* 24.6* 26.7*   PLATELETS 10*3/mm3 127* 114* 115*     BMP:  Results from last 7 days   Lab Units 24  0419 24  0537 24  1051   SODIUM mmol/L 140 141 136   POTASSIUM mmol/L 3.0* 3.5 3.6   CHLORIDE mmol/L 102 103 103   CO2 mmol/L 26.0 27.0 23.0   BUN mg/dL 29* 22 15   CREATININE mg/dL 1.52* 1.28* 1.08*   GLUCOSE mg/dL 90 106* 105*   CALCIUM mg/dL 7.9* 8.5* 8.6   ALT (SGPT) U/L  --   30     Culture Results:   Blood Culture   Date Value Ref Range Status   2024 No growth at 24 hours  Preliminary   2024 Gram Negative Bacilli (C)  Preliminary     Urine Culture   Date Value Ref Range Status   2024 >100,000 CFU/mL Gram Negative Bacilli (A)  Preliminary     Radiology: None  Additional Studies Reviewed: None    Impression:   1.  Gram-negative bacteremia-Proteus-susceptibility pending  2.  Recent bacteremia with carbapenem resistant Enterobacter  3.  Bacteremia with E. coli in April of this year  4.  Remote history of MSSA bacteremia    Recommendations:   Continue cefepime  Await " susceptibility of blood and urine culture  Contact precautions  Supportive care  Continue to follow    Elie Ohara MD

## 2024-05-26 NOTE — PROGRESS NOTES
[facility]  INPATIENT PROGRESS NOTE    Patient Name: Tawny Shin  Date of Admission: 5/24/2024  Today's Date: 05/26/24  Length of Stay: 2  Primary Care Physician: Moises Oliveira MD    Subjective   Chief Complaint: Weakness.  HPI   Patient is oriented x 3, a lot more alert today.  Recommend update discussed with patient.  Blood pressure slightly high afebrile.  Slight increase in creatinine.  Hemoglobin stable.  Platelet counts increasing.    Review of Systems   Constitutional:  Positive for activity change, appetite change and fatigue. Negative for chills and fever.   HENT:  Negative for hearing loss, nosebleeds, tinnitus and trouble swallowing.    Eyes:  Negative for visual disturbance.   Respiratory:  Negative for cough, chest tightness, shortness of breath and wheezing.    Cardiovascular:  Negative for chest pain, palpitations and leg swelling.   Gastrointestinal:  Negative for abdominal distention, abdominal pain, blood in stool, constipation, diarrhea, nausea and vomiting.   Endocrine: Negative for cold intolerance, heat intolerance, polydipsia, polyphagia and polyuria.   Genitourinary:  Negative for decreased urine volume, difficulty urinating, dysuria, flank pain, frequency and hematuria.   Musculoskeletal:  Positive for arthralgias, gait problem and myalgias. Negative for joint swelling.   Skin:  Negative for rash.   Allergic/Immunologic: Negative for immunocompromised state.   Neurological:  Positive for weakness. Negative for dizziness, syncope, light-headedness and headaches.   Hematological:  Negative for adenopathy. Does not bruise/bleed easily.   Psychiatric/Behavioral:  Negative for confusion and sleep disturbance. The patient is not nervous/anxious.   Otherwise complete ROS reviewed and negative except as mentioned in the HPI.  All pertinent negatives and positives are as above. All other systems have been reviewed and are negative  Request sent to dr stone   unless otherwise stated.     Objective    Temp:  [97.7 °F (36.5 °C)-98.4 °F (36.9 °C)] 98.4 °F (36.9 °C)  Heart Rate:  [69-77] 76  Resp:  [16] 16  BP: (110-149)/(50-59) 141/58  Physical Exam  Vitals and nursing note reviewed.   Constitutional:       Comments: Chronically ill.   HENT:      Head: Normocephalic.   Eyes:      Conjunctiva/sclera: Conjunctivae normal.      Pupils: Pupils are equal, round, and reactive to light.   Cardiovascular:      Rate and Rhythm: Normal rate and regular rhythm.      Heart sounds: Normal heart sounds.   Pulmonary:      Effort: Pulmonary effort is normal. No respiratory distress.      Breath sounds: Normal breath sounds.   Abdominal:      General: Bowel sounds are normal. There is no distension.      Palpations: Abdomen is soft.      Tenderness: There is no abdominal tenderness.      Comments: Obesity .   Musculoskeletal:         General: No swelling.      Cervical back: Neck supple.      Comments: Deformities of the hand from arthritis.   Skin:     General: Skin is warm and dry.      Capillary Refill: Capillary refill takes 2 to 3 seconds.      Findings: No rash.   Neurological:      Mental Status: She is alert and oriented to person, place, and time.      Motor: Weakness present.      Coordination: Coordination abnormal.      Gait: Gait abnormal.   Psychiatric:         Mood and Affect: Mood normal.         Behavior: Behavior normal.         Thought Content: Thought content normal.       Results Review:  I have reviewed the labs, radiology results, and diagnostic studies.    Laboratory Data:   Results from last 7 days   Lab Units 05/26/24  0419 05/25/24  0537 05/24/24  1051   WBC 10*3/mm3 6.77 8.09 6.65   HEMOGLOBIN g/dL 7.7* 7.5* 8.2*   HEMATOCRIT % 25.1* 24.6* 26.7*   PLATELETS 10*3/mm3 127* 114* 115*        Results from last 7 days   Lab Units 05/26/24  0419 05/25/24  0537 05/24/24  1051   SODIUM mmol/L 140 141 136   POTASSIUM mmol/L 3.0* 3.5 3.6   CHLORIDE mmol/L 102 103 103   CO2  mmol/L 26.0 27.0 23.0   BUN mg/dL 29* 22 15   CREATININE mg/dL 1.52* 1.28* 1.08*   CALCIUM mg/dL 7.9* 8.5* 8.6   BILIRUBIN mg/dL  --  0.5 0.7   ALK PHOS U/L  --  62 66   ALT (SGPT) U/L  --  26 30   AST (SGOT) U/L  --  26 39*   GLUCOSE mg/dL 90 106* 105*       Culture Data:   Blood Culture   Date Value Ref Range Status   05/24/2024 No growth at 24 hours  Preliminary   05/24/2024 Gram Negative Bacilli (C)  Preliminary     Urine Culture   Date Value Ref Range Status   05/24/2024 >100,000 CFU/mL Proteus mirabilis (A)  Final       Radiology Data:   Imaging Results (Last 24 Hours)       ** No results found for the last 24 hours. **            I have reviewed the patient's current medications.     Assessment/Plan   Assessment  Active Hospital Problems    Diagnosis     **Fluid overload     Hypothyroidism     Paralysis     History of urinary retention     Cholelithiasis     Essential hypertension     Coronary artery disease involving native coronary artery of native heart without angina pectoris     Sick sinus syndrome     Venous insufficiency     Overweight (BMI 25.0-29.9)     Rheumatoid arthritis involving multiple sites     (HFpEF) heart failure with preserved ejection fraction     Chronic anticoagulation     Iron deficiency anemia     Osteoporosis     Chronic embolism and thrombosis of unspecified deep veins of left lower extremity     T12 compression fracture, initial encounter        Treatment Plan  Shortness of breath.  Vascular congestion.  Patient received 80 of Lasix in ER.  Chest x-ray-Low lung volumes. Stable cardiomegaly with similar positioning of the  left subclavian cardiac pacer leads- Vascular crowding versus mild venous congestion,  No consolidation.  CT of the chest-Small bilateral pleural fluid and small layering groundglass opacity  at the fissures- could represent small pulmonary edema/fluid or atelectasis, Gallstone at the proximal gallbladder neck or cystic duct- Partially visualized gallbladder  appears distended- not optimally evaluated on this exam, Diffuse esophageal thickening of the mid and distal esophagus- is new compared to 4/22/2022, recommend referral to gastroenterology for consideration of direct visualization.  Patient is on room air.     Gallstone proximal gallbladder neck and cystic duct/diffuse esophageal thickening of mid and distal esophagus. GI consult.  Ultrasound the gallbladder-Gallstone with no sign of cholecystitis.   GI recommendation-no additional workup for cholelithiasis until patients become symptomatic, dysphagia for last couple months-plan for upper endoscopy next week once respiratory status improved, outpatient colonoscopy because this past due.      UTI.   Infect disease consult.  History of CRE.  Continue cefepime.     Chronic stage IIIb renal failure.  Creatinine increased to 1.52, probably secondary to Lasix one-time dose in ER 80 mg.     Rheumatoid arthritis/Hx Sjogren .  Biotene.  Magic mouthwash. Arava.  Prednisone daily.     History of DVT . Eliquis.     CAD/hypertension/hyperlipidemia/pacemaker due to history of sick sinus syndrome.  Aspirin. .  BNP 5000 in ER.  Patient received 80 of Lasix in the ER.  Imdur .  Continue Cozaar.  Nitro as needed.  Coreg.  Daily weight.  Strict in and out.  Allergic to statin, possible Lovaza-unable to give because we do not have it here, possible at discharge.     Depression/anxiety.  Cymbalta.     Hypokalemia.  P.o. potassium.  Magnesium level.     Anemia.  Hemoglobin stable..  No sign of acute bleed.  Folic acid.  Iron sulfate.    Thrombocytopenia.  Platelet counts increasing.     Hypothyroidism . Synthroid.  TSH-normal.     Reflux.  Tums.  Zofran as needed.  Pepcid IV.     Constipation.  Magnesium oxide as needed.  Constipation protocol  as needed.  .     Pain.  Voltaren gel as needed.  Lidocaine patch as needed.  Ultram as needed.     Nutrition . Folic acid.  Cardiac diet.  Boost supplement.  Vitamin C.     Deconditioning.  PT  and OT consult.  Patient get around with a walker at baseline.     Dr. Shin's mom.     Blood cultures Proteus 1 out of 2 bottles.  Urine culture-Proteus.     Medical Decision Making  Number and Complexity of problems: Shortness of breath/CHF exacerbation/gallstones/thickening esophagus/fail to thrive/rheumatoid arthritis/CAD/  Differential Diagnosis: None     Conditions and Status        Condition is unchanged.     MDM Data  External documents reviewed: Previous note .  Cardiac tracing (EKG, telemetry) interpretation: Sinus rhythm .  Radiology interpretation: CT scan/x-ray  Labs reviewed: Laboratory  Any tests that were considered but not ordered: Laboratory in AM     Decision rules/scores evaluated (example [id number], [place], etc): None     Discussed with: Patient and caregiver.     Care Planning  Shared decision making: Patient and caregiver.  Code status and discussions: Full code.     Disposition  Social Determinants of Health that impact treatment or disposition: From home.  Estimated length of stay is 1 to 4 days.     Electronically signed by Saulo Ambriz MD, 05/26/24, 11:19 CDT.

## 2024-05-26 NOTE — PLAN OF CARE
Goal Outcome Evaluation:  Plan of Care Reviewed With: patient        Progress: no change  Outcome Evaluation: The patient presents alert and oriented x4 lying in bed. She is typically independent in bed mobility and transfers with use of RW. Today she demonstrates functional weakness and requires assist to stand. She is unable to maintain standing long enough to perform a transfer to a chair or take any steps. She will benefit from continued PT to work on strengthening and increased activity tolerance. Recommend discharge home with assist and continue with her outpt PT.      Anticipated Discharge Disposition (PT): home with 24/7 care, home with outpatient therapy services

## 2024-05-26 NOTE — PLAN OF CARE
Goal Outcome Evaluation:  Plan of Care Reviewed With: patient        Progress: no change  Outcome Evaluation: OT cristofer completed.  Pt alert and oriented x4.  Pt lives at home with 24/7 caregivers and is typically independent with mobility and requires Jose R for ADL.  Pt reports her caregivers tend to assist her more than she needs.  Educated pt on trying to do as many activities independently as possible as long as she is safe.  Pt has pitting edema in B LE.  She came to EOB with Jose R.  Donned socks with maxA, unable to lift LEs due to weakness and edema.  Also needed maxA for hygiene and clothing mgmt after voiding in bed.  Pt transferred x3 with modAx2 but was unable to maintain standing position due to weakness and sat back down on bed each time.  Unable to take steps today to chair.  Returned to bed with CGA.  OT will continue to treat pt to address decreased strength, endurance, and independence with ADL.  Recommend SNF pending progress.

## 2024-05-26 NOTE — PLAN OF CARE
Goal Outcome Evaluation:  Plan of Care Reviewed With: patient, caregiver        Progress: no change  Outcome Evaluation: Pt has had no c/o pain so far this shift; purewick remains in place, changed this AM; IV abx continue; turned as pt tolerates; right hip/gluteal incision with mepilex in place; UOP adequate; tolerating diet; safety maintained.

## 2024-05-26 NOTE — THERAPY EVALUATION
Patient Name: Tawny Shin  : 1955    MRN: [id number]                              Today's Date: 2024       Admit Date: 2024    Visit Dx:     ICD-10-CM ICD-9-CM   1. Hypervolemia, unspecified hypervolemia type  E87.70 276.69   2. Debility  R53.81 799.3   3. Esophageal dysphagia  R13.19 787.29   4. Impaired mobility [Z74.09]  Z74.09 799.89     Patient Active Problem List   Diagnosis    T12 compression fracture, initial encounter    Chronic embolism and thrombosis of unspecified deep veins of left lower extremity    Chronic anticoagulation    Iron deficiency anemia    Osteoporosis    E coli bacteremia    Epidural hematoma    Pleural effusion, left    Functional neurological symptom disorder with weakness or paralysis    Overweight (BMI 25.0-29.9)    Rheumatoid arthritis involving multiple sites    (HFpEF) heart failure with preserved ejection fraction    Venous insufficiency    Coronary artery disease involving native coronary artery of native heart without angina pectoris    Sick sinus syndrome    Essential hypertension    Pressure injury of skin of heel    Chronic pain    Anemia of chronic disease    Cholelithiasis    Pressure injury of skin of buttock    Near functional paraplegia    Skin cancer    Squamous cell carcinoma of back    Hematoma    Right-sided chest wall pain    Hyperlipidemia LDL goal <70    Malodorous urine    Stage 3b chronic kidney disease    Cyst of buttocks    Sepsis due to Escherichia coli without acute organ dysfunction    Generalized weakness    Paralysis    History of urinary retention    Bacteremia due to Enterobacter species    Bacteremia    Hypothyroidism    Fluid overload     Past Medical History:   Diagnosis Date    Age-related osteoporosis with current pathological fracture 2020    Arthritis     Asthma     Bilateral bunions 2020    Cancer     Cardiac pacemaker syndrome 2020    Overview:  - heart block - implanted     Charcot's joint of foot,  "left 12/23/2020    Chronic deep vein thrombosis (DVT) of right lower extremity 06/23/2021    Chronic pain syndrome 06/22/2021    Chronic sinusitis     COPD (chronic obstructive pulmonary disease)     Coronary artery disease     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    Elevated cholesterol     Eustachian tube dysfunction     Heart disease     Herpes simplex     History of transfusion     Hyperlipidemia     Hypertension     Hypothyroidism 12/23/2020    Intrinsic asthma 12/23/2020    Knee dislocation     Labral tear of right hip joint     Laryngitis sicca     Laryngitis, chronic     Left carotid bruit 03/09/2016    MI (myocardial infarction)     Myalgia due to statin 06/25/2019    Open wound of right hip 09/14/2021    Osteomyelitis of right femur 07/06/2021    Otorrhea     Pacemaker 11/17/2016    Primary osteoarthritis of left knee 12/23/2020    Psoriasis vulgaris 12/23/2020    S/P coronary artery stent placement 03/09/2016    Sensorineural hearing loss     Seropositive rheumatoid arthritis of multiple sites 12/27/2019    Overview:  -myochrysine '93-'96 -methotrexate '96--->11/98;r/s  restarted 2/99--> 8/14 (anemia) -sulfasalazine- not effective -penicillamine 6/98-->10/98; no effect -leflunomide 11/98--> - Humira '13-->didn't take - Enbrel 12/14-->3/15- no effect!   Last Assessment & Plan:  - \"aching all over\" because she had to be off her anti-rheumatic drugs for 2 weeks in preparation for her R knee surgery - he    Sick sinus syndrome 12/27/2019    Sjogren's disease     Spondylolisthesis of lumbar region 01/17/2018    Syncope, recurrent 02/08/2021    Urinary tract infection     UTI (urinary tract infection) 04/19/2024     Past Surgical History:   Procedure Laterality Date    A-V CARDIAC PACEMAKER INSERTION  2016    ATRIAL CARDIAC PACEMAKER INSERTION      CARDIAC CATHETERIZATION      CATARACT EXTRACTION      CERVICAL CORPECTOMY N/A 3/3/2021    Procedure: CERVICAL 6 CORPECTOMY WITH TITANIUM CAGE WITH NEURO " MONITORING;  Surgeon: Bandar Shea MD;  Location:  [facility];  Service: Neurosurgery;  Laterality: N/A;    COLONOSCOPY  11/08/2011    One fold in the ascending colon which showed ulcer otherwise normal exam    COLONOSCOPY  11/12/2004    Normal exam repeat in five years    CORONARY ANGIOPLASTY WITH STENT PLACEMENT      X 2; 2013 & 2014    ENDOSCOPY  07/10/2014    Normal exam    FLAP LEG Right 9/14/2021    Procedure: RIGHT GLUTEAL FASCIOCUTANEOUS ADVANCEMENT FLAP AND RIGHT TENSOR FASCIAL JESSICA FLAP;  Surgeon: Amadeo Turner MD;  Location:  [facility];  Service: Plastics;  Laterality: Right;    HIP ABDUCTION TENOTOMY BILATERAL Right 1/14/2021    Procedure: RIGHT HIP GLUTEUS MEDLUS / MINIMUS REPAIR, POSSIBLE ACHILLES ALLOGRAFT;  Surgeon: Nino Carlson MD;  Location:  [facility];  Service: Orthopedics;  Laterality: Right;    INCISION AND DRAINAGE ABSCESS Right 6/4/2022    Procedure: INCISION AND DRAINAGE ABSCESS right hip;  Surgeon: Magda Salcido MD;  Location:  [facility];  Service: General;  Laterality: Right;    INCISION AND DRAINAGE ABSCESS Right 6/10/2022    Procedure: RIGHT HIP INCISION AND DRAINAGE. MD NEEDS 3L VANC IRRIGATION, CURRETTES, DAICANS, KERLEX ROLLS;  Surgeon: Amadeo Turner MD;  Location:  [facility];  Service: Plastics;  Laterality: Right;    INCISION AND DRAINAGE HIP Right 2/9/2021    Procedure: HIP INCISION AND DRAINAGE;  Surgeon: Nino Carlson MD;  Location:  [facility];  Service: Orthopedics;  Laterality: Right;    INCISION AND DRAINAGE LEG Right 10/24/2021    Procedure: INCISION AND DRAINAGE LOWER EXTREMITY;  Surgeon: Amadeo Turner MD;  Location:  [facility];  Service: Plastics;  Laterality: Right;    INCISION AND DRAINAGE OF WOUND Right 7/8/2021    Procedure: INCISION AND DRAINAGE WOUND RIGHT HIP;  Surgeon: James Huntley MD;  Location:  [facility];  Service: Orthopedics;  Laterality: Right;    JOINT REPLACEMENT      KYPHOPLASTY WITH BIOPSY Bilateral 10/26/2021    Procedure:  [facility] 12 KYPHOPLASTY WITH BIOPSY;  Surgeon: Bandar Shea MD;  Location:  [facility];  Service: Neurosurgery;  Laterality: Bilateral;    LEG DEBRIDEMENT Right 9/14/2021    Procedure: DEBRIDEMENT OF RIGHT HIP WOUND, RIGHT GLUTEAL FASCIOCUTANEOUS ADVANCEMENT FLAP AND RIGHT TENSOR FASCIAL JESSICA FLAP;  Surgeon: Amadeo Turner MD;  Location:  [facility];  Service: Plastics;  Laterality: Right;    LUMBAR DISCECTOMY Right 3/23/2021    Procedure: LUMBAR DISCECTOMY MICRO, Lumbar 1/2 right;  Surgeon: Bandar Shea MD;  Location:  [facility];  Service: Neurosurgery;  Laterality: Right;    LUMBAR FUSION N/A 1/19/2018    Procedure: L3-4,L4-5 DECOMPRESSION, POSTERIOR SPINAL FUSION WITH INSTRUMENTATION;  Surgeon: Fortino Oropeza MD;  Location:  [facility];  Service:     LUMBAR LAMINECTOMY WITH FUSION Left 1/17/2018    Procedure: LEFT L3-4 L4-5 LATERAL LUMBAR INTERBODY FUSION;  Surgeon: Fortino Oropeza MD;  Location:  [facility];  Service:     MASS EXCISION Right 4/23/2024    Procedure: RIGHT BUTTOCK MASS EXCISION;  Surgeon: Moises Keyes MD;  Location:  [facility];  Service: General;  Laterality: Right;    MYRINGOTOMY W/ TUBES  09/04/2014    TUBES NO LONGER IN PLACE    OTHER SURGICAL HISTORY      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 so hardware was removed and spacers were placed    REPLACEMENT TOTAL KNEE Right       General Information       Row Name 05/26/24 0800          Physical Therapy Time and Intention    Document Type evaluation  fluid overload, SOA  -MS     Mode of Treatment physical therapy;co-treatment  -MS       Row Name 05/26/24 0800          General Information    Patient Profile Reviewed yes  -MS     Prior Level of Function independent:;transfer;w/c or scooter;bathing;min assist:;ADL's;dressing  -MS     Existing Precautions/Restrictions fall  -MS     Barriers to Rehab physical barrier  -MS       Row Name 05/26/24 0800          Living Environment    People in Home alone  24/7 caregivers  -MS       Row  Name 05/26/24 0800          Home Main Entrance    Number of Stairs, Main Entrance none  -MS       Row Name 05/26/24 0800          Stairs Within Home, Primary    Number of Stairs, Within Home, Primary none  -MS       Row Name 05/26/24 0800          Cognition    Orientation Status (Cognition) oriented x 4  -MS       Row Name 05/26/24 0800          Safety Issues, Functional Mobility    Impairments Affecting Function (Mobility) balance;pain;range of motion (ROM);strength;endurance/activity tolerance  -MS               User Key  (r) = Recorded By, (t) = Taken By, (c) = Cosigned By      Initials Name Provider Type    Nicole Palacios, PT, DPT, NCS Physical Therapist                   Mobility       Row Name 05/26/24 0854          Bed Mobility    Bed Mobility rolling left;rolling right;scooting/bridging;supine-sit;sit-supine  -MS     Rolling Left [place] (Bed Mobility) supervision  -MS     Rolling Right [place] (Bed Mobility) supervision  -MS     Scooting/Bridging [place] (Bed Mobility) minimum assist (75% patient effort);2 person assist;verbal cues;nonverbal cues (demo/gesture)  -MS     Supine-Sit [place] (Bed Mobility) standby assist  -MS     Sit-Supine [place] (Bed Mobility) standby assist  -MS     Assistive Device (Bed Mobility) bed rails;head of bed elevated;draw sheet  -MS       Row Name 05/26/24 0854          Sit-Stand Transfer    Sit-Stand [place] (Transfers) minimum assist (75% patient effort);2 person assist;verbal cues;nonverbal cues (demo/gesture)  -MS     Assistive Device (Sit-Stand Transfers) walker, front-wheeled  -MS     Comment, (Sit-Stand Transfer) performed sit to stand x3, unable to maintain standing longer than 10 sec  -MS               User Key  (r) = Recorded By, (t) = Taken By, (c) = Cosigned By      Initials Name Provider Type    Nicole Palacios, PT, DPT, NCS Physical Therapist                   Obj/Interventions       Row Name 05/26/24 0800          Range of  Motion Comprehensive    General Range of Motion bilateral upper extremity ROM WFL;bilateral lower extremity ROM WFL  -MS       Row Name 05/26/24 0800          Strength Comprehensive (MMT)    Comment, General Manual Muscle Testing (MMT) Assessment B LEs 4-/5 except R dorsiflexion 2/5  -MS       Row Name 05/26/24 0800          Balance    Balance Assessment sitting static balance;sitting dynamic balance;standing static balance  -MS     Static Sitting Balance standby assist  -MS     Dynamic Sitting Balance standby assist  -MS     Position, Sitting Balance unsupported;sitting edge of bed  -MS     Static Standing Balance minimal assist  -MS     Position/Device Used, Standing Balance walker, rolling  -MS               User Key  (r) = Recorded By, (t) = Taken By, (c) = Cosigned By      Initials Name Provider Type    Nicole Palacios, PT, DPT, NCS Physical Therapist                   Goals/Plan       Row Name 05/26/24 0800          Bed Mobility Goal 1 (PT)    Activity/Assistive Device (Bed Mobility Goal 1, PT) bed mobility activities, all  -MS     [place] Level/Cues Needed (Bed Mobility Goal 1, PT) independent  -MS     Time Frame (Bed Mobility Goal 1, PT) long term goal (LTG);by discharge  -MS     Progress/Outcomes (Bed Mobility Goal 1, PT) new goal  -MS       Row Name 05/26/24 0800          Transfer Goal 1 (PT)    Activity/Assistive Device (Transfer Goal 1, PT) sit-to-stand/stand-to-sit;bed-to-chair/chair-to-bed;walker, rolling  -MS     [place] Level/Cues Needed (Transfer Goal 1, PT) modified independence  -MS     Time Frame (Transfer Goal 1, PT) long term goal (LTG);by discharge  -MS     Progress/Outcome (Transfer Goal 1, PT) new goal  -MS       Row Name 05/26/24 0800          Gait Training Goal 1 (PT)    Activity/Assistive Device (Gait Training Goal 1, PT) gait (walking locomotion);assistive device use;decrease fall risk;increase endurance/gait distance;improve balance and speed;walker, rolling  -MS      [place] Level (Gait Training Goal 1, PT) modified independence  -MS     Distance (Gait Training Goal 1, PT) 20ft  -MS     Time Frame (Gait Training Goal 1, PT) long term goal (LTG);by discharge  -MS     Progress/Outcome (Gait Training Goal 1, PT) new goal  -MS       Row Name 05/26/24 0800          Therapy Assessment/Plan (PT)    Planned Therapy Interventions (PT) balance training;bed mobility training;gait training;patient/family education;strengthening;transfer training  -MS               User Key  (r) = Recorded By, (t) = Taken By, (c) = Cosigned By      Initials Name Provider Type    MS Nicole Olson R, PT, DPT, NCS Physical Therapist                   Clinical Impression       Row Name 05/26/24 0800          Pain    Pretreatment Pain Rating 4/10  -MS     Posttreatment Pain Rating 4/10  -MS     Pain Location - Side/Orientation Bilateral  -MS     Pain Location - knee  -MS     Pain Intervention(s) Repositioned;Ambulation/increased activity  -MS       Row Name 05/26/24 0800          Plan of Care Review    Plan of Care Reviewed With patient  -MS     Progress no change  -MS     Outcome Evaluation The patient presents alert and oriented x4 lying in bed. She is typically independent in bed mobility and transfers with use of RW. Today she demonstrates functional weakness and requires assist to stand. She is unable to maintain standing long enough to perform a transfer to a chair or take any steps. She will benefit from continued PT to work on strengthening and increased activity tolerance. Recommend discharge home with assist and continue with her outpt PT.  -MS       Row Name 05/26/24 0800          Therapy Assessment/Plan (PT)    Patient/Family Therapy Goals Statement (PT) go home  -MS     Rehab Potential (PT) good, to achieve stated therapy goals  -MS     Criteria for Skilled Interventions Met (PT) yes;meets criteria;skilled treatment is necessary  -MS     Therapy Frequency (PT) 2 times/day  -MS     Predicted  Duration of Therapy Intervention (PT) until discharge  -MS       Row Name 05/26/24 0800          Positioning and Restraints    Post Treatment Position bed  -MS     In Bed fowlers;call light within reach;encouraged to call for assist;with family/caregiver;side rails up x2;with nsg  -MS               User Key  (r) = Recorded By, (t) = Taken By, (c) = Cosigned By      Initials Name Provider Type    Nicole Palacios, PT, DPT, NCS Physical Therapist                   Outcome Measures       Row Name 05/26/24 0800          How much help from another person do you currently need...    Turning from your back to your side while in flat bed without using bedrails? 3  -MS     Moving from lying on back to sitting on the side of a flat bed without bedrails? 3  -MS     Moving to and from a bed to a chair (including a wheelchair)? 1  -MS     Standing up from a chair using your arms (e.g., wheelchair, bedside chair)? 3  -MS     Climbing 3-5 steps with a railing? 1  -MS     To walk in hospital room? 1  -MS     AM-PAC 6 Clicks Score (PT) 12  -MS     Highest Level of Mobility Goal 4 --> Transfer to chair/commode  -MS       Row Name 05/26/24 0801 05/26/24 0800       Functional Assessment    Outcome Measure Options AM-PAC 6 Clicks Daily Activity (OT)  -AC AM-PAC 6 Clicks Basic Mobility (PT)  -MS              User Key  (r) = Recorded By, (t) = Taken By, (c) = Cosigned By      Initials Name Provider Type    Sebastián Catherine, OTR/L, CNT Occupational Therapist    Nicole Palacios, PT, DPT, NCS Physical Therapist                                 Physical Therapy Education       Title: PT OT SLP Therapies (In Progress)       Topic: Physical Therapy (In Progress)       Point: Mobility training (Done)       Learning Progress Summary             Patient Acceptance, E, VU by MS at 5/26/2024 0910    Comment: role of PT in her care                         Point: Home exercise program (Not Started)       Learner Progress:  Not documented in  this visit.              Point: Body mechanics (Not Started)       Learner Progress:  Not documented in this visit.              Point: Precautions (Not Started)       Learner Progress:  Not documented in this visit.                              User Key       Initials Effective Dates Name Provider Type Discipline    MS 07/11/23 -  [place] Nicole EVERARDO, PT, DPT, NCS Physical Therapist PT                  PT Recommendation and Plan  Planned Therapy Interventions (PT): balance training, bed mobility training, gait training, patient/family education, strengthening, transfer training  Plan of Care Reviewed With: patient  Progress: no change  Outcome Evaluation: The patient presents alert and oriented x4 lying in bed. She is typically independent in bed mobility and transfers with use of RW. Today she demonstrates functional weakness and requires assist to stand. She is unable to maintain standing long enough to perform a transfer to a chair or take any steps. She will benefit from continued PT to work on strengthening and increased activity tolerance. Recommend discharge home with assist and continue with her outpt PT.     Time Calculation:         PT Charges       Row Name 05/26/24 0800             Time Calculation    Start Time 0800  -MS      Stop Time 0839  -MS      Time Calculation (min) 39 min  -MS      PT Received On 05/26/24  -MS      PT Goal Re-Cert Due Date 06/05/24  -MS         Untimed Charges    PT Eval/Re-eval Minutes 39  -MS         Total Minutes    Untimed Charges Total Minutes 39  -MS       Total Minutes 39  -MS                User Key  (r) = Recorded By, (t) = Taken By, (c) = Cosigned By      Initials Name Provider Type    MS Nicole Olson EVERARDO, PT, DPT, NCS Physical Therapist                      PT G-Codes  Outcome Measure Options: AM-PAC 6 Clicks Daily Activity (OT)  AM-PAC 6 Clicks Score (PT): 12  AM-PAC 6 Clicks Score (OT): 17  PT Discharge Summary  Anticipated Discharge Disposition (PT): home with 24/7  care, home with outpatient therapy services    Nicole Olson, PT, DPT, NCS  5/26/2024

## 2024-05-26 NOTE — PROGRESS NOTES
[facility]astroenterology  Inpatient Progress Note  Today's date:  05/26/24    Tawny Shin  1955       Reason for Follow Up: Dysphagia; esophageal thickening on CT    Subjective:   Patient without new complaints today.  Reports solid and liquid dysphagia over the last 2 months.  Denies weight loss.      Allergies   Allergen Reactions    Atorvastatin Other (See Comments)     LEG CRAMPS      Amoxicillin Rash    Escitalopram Rash    Nabumetone Rash    Niacin Er Rash    Penicillin G Rash    Penicillins Rash    Simvastatin Rash       Current Facility-Administered Medications:     acetaminophen (TYLENOL) tablet 650 mg, 650 mg, Oral, Q6H PRN, Saulo Ambriz MD, 650 mg at 05/25/24 1210    albuterol (PROVENTIL) nebulizer solution 0.042% 1.25 mg/3mL, 1.25 mg, Nebulization, Q6H PRN, Yuli Hatch DO    apixaban (ELIQUIS) tablet 5 mg, 5 mg, Oral, BID, Saulo Ambriz MD, 5 mg at 05/26/24 0827    ascorbic acid (VITAMIN C) tablet 250 mg, 250 mg, Oral, Daily, Saulo Ambriz MD, 250 mg at 05/26/24 0827    aspirin EC tablet 81 mg, 81 mg, Oral, Daily, Saulo Ambriz MD, 81 mg at 05/26/24 0827    Biotene dry mouth liquid 15 mL, 15 mL, Swish & Spit, 5x Daily PRN, Saulo Ambriz MD    sennosides-docusate (PERICOLACE) 8.6-50 MG per tablet 2 tablet, 2 tablet, Oral, BID PRN **AND** polyethylene glycol (MIRALAX) packet 17 g, 17 g, Oral, Daily PRN **AND** bisacodyl (DULCOLAX) EC tablet 5 mg, 5 mg, Oral, Daily PRN **AND** bisacodyl (DULCOLAX) suppository 10 mg, 10 mg, Rectal, Daily PRN, Saulo Ambriz MD    carvedilol (COREG) tablet 25 mg, 25 mg, Oral, BID With Meals, Saulo Ambriz MD, 25 mg at 05/26/24 0826    cefepime 2000 mg IVPB in 100 mL NS (MBP), 2,000 mg, Intravenous, Q12H, Saulo Ambriz MD, 2,000 mg at 05/26/24 1501    Diclofenac Sodium (VOLTAREN) 1 % gel 4 g, 4 g, Topical, 4x Daily PRN, Saulo Ambriz MD    DULoxetine (CYMBALTA) DR capsule 30 mg, 30 mg, Oral, Daily, Saulo Ambriz MD, 30 mg at  05/26/24 0826    famotidine (PEPCID) injection 20 mg, 20 mg, Intravenous, BID, Saulo Ambriz MD, 20 mg at 05/26/24 0825    ferrous sulfate tablet 325 mg, 325 mg, Oral, Daily With Breakfast, Saulo Ambriz MD, 325 mg at 05/26/24 0827    First Mouthwash (Magic Mouthwash) 5 mL, 5 mL, Swish & Spit, Q6H, Saulo Ambriz MD, 5 mL at 05/26/24 1630    folic acid (FOLVITE) tablet 1,000 mcg, 1,000 mcg, Oral, Daily, Saulo Ambriz MD, 1,000 mcg at 05/26/24 0826    isosorbide mononitrate (IMDUR) 24 hr tablet 60 mg, 60 mg, Oral, Daily, Saulo Ambriz MD, 60 mg at 05/26/24 0826    leflunomide (ARAVA) tablet 20 mg, 20 mg, Oral, Daily, Saulo Ambriz MD, 20 mg at 05/26/24 0826    levothyroxine (SYNTHROID, LEVOTHROID) tablet 75 mcg, 75 mcg, Oral, Q AM, Saulo Ambriz MD, 75 mcg at 05/26/24 0602    Lidocaine 4 % 2 patch, 2 patch, Transdermal, Q24H, Saulo Ambriz MD, 2 patch at 05/26/24 0825    losartan (COZAAR) tablet 50 mg, 50 mg, Oral, Daily, Saulo Ambriz MD, 50 mg at 05/26/24 0825    magnesium hydroxide (MILK OF MAGNESIA) 400 MG/5ML suspension 30 mL, 30 mL, Oral, Daily PRN, Saulo Ambriz MD    multivitamin with minerals 1 tablet, 1 tablet, Oral, Daily, Saulo Ambriz MD, 1 tablet at 05/26/24 0826    nitroglycerin (NITROSTAT) SL tablet 0.4 mg, 0.4 mg, Sublingual, Q5 Min PRN, Saulo Ambriz MD    ondansetron (ZOFRAN) injection 4 mg, 4 mg, Intravenous, Q6H PRN, Saulo Ambriz MD    predniSONE (DELTASONE) tablet 5 mg, 5 mg, Oral, Daily, Saulo Ambriz MD, 5 mg at 05/26/24 0825    sodium chloride 0.9 % flush 10 mL, 10 mL, Intravenous, PRN, Saulo Ambriz MD    sodium chloride 0.9 % flush 10 mL, 10 mL, Intravenous, Q12H, Saulo Ambriz MD, 10 mL at 05/26/24 0828    sodium chloride 0.9 % flush 10 mL, 10 mL, Intravenous, PRN, Saulo Ambriz MD    sodium chloride 0.9 % infusion 40 mL, 40 mL, Intravenous, PRN, Saulo Ambriz MD    traMADol (ULTRAM) tablet 50 mg, 50 mg, Oral, Q12H PRN, Saulo Ambriz MD, 50 mg at  05/25/24 1754    valACYclovir (VALTREX) tablet 500 mg, 500 mg, Oral, Daily PRN, Saulo Ambriz MD    Review of Systems:       Vital Signs:  Temp:  [97.7 °F (36.5 °C)-98.5 °F (36.9 °C)] 98.5 °F (36.9 °C)  Heart Rate:  [71-77] 76  Resp:  [16] 16  BP: (110-149)/(48-59) 124/48  Body mass index is 34.2 kg/m².     Intake/Output Summary (Last 24 hours) at 5/26/2024 1658  Last data filed at 5/26/2024 0552  Gross per 24 hour   Intake 720 ml   Output 600 ml   Net 120 ml     No intake/output data recorded.  Physical Exam:  Physical Exam    HEENT-sclera anicteric  CV-RRR  Lung-good expansion bilaterally  ABD-NT/ND     Results Review:   I have reviewed all of the patient's current test results    Results from last 7 days   Lab Units 05/26/24  0419 05/25/24  0537 05/24/24  1051   WBC 10*3/mm3 6.77 8.09 6.65   HEMOGLOBIN g/dL 7.7* 7.5* 8.2*   HEMATOCRIT % 25.1* 24.6* 26.7*   PLATELETS 10*3/mm3 127* 114* 115*       Results from last 7 days   Lab Units 05/26/24  0419 05/25/24  0537 05/24/24  1051   SODIUM mmol/L 140 141 136   POTASSIUM mmol/L 3.0* 3.5 3.6   CHLORIDE mmol/L 102 103 103   CO2 mmol/L 26.0 27.0 23.0   BUN mg/dL 29* 22 15   CREATININE mg/dL 1.52* 1.28* 1.08*   CALCIUM mg/dL 7.9* 8.5* 8.6   BILIRUBIN mg/dL  --  0.5 0.7   ALK PHOS U/L  --  62 66   ALT (SGPT) U/L  --  26 30   AST (SGOT) U/L  --  26 39*   GLUCOSE mg/dL 90 106* 105*             Lab Results   Lab Value Date/Time    LIPASE 16 04/19/2024 0856       Radiology Review:  Imaging Results (Last 24 Hours)       ** No results found for the last 24 hours. **            Impression/Plan:    Fluid overload    T12 compression fracture, initial encounter    Chronic embolism and thrombosis of unspecified deep veins of left lower extremity    Chronic anticoagulation    Iron deficiency anemia    Osteoporosis    Overweight (BMI 25.0-29.9)    Rheumatoid arthritis involving multiple sites    (HFpEF) heart failure with preserved ejection fraction    Venous insufficiency    Coronary  artery disease involving native coronary artery of native heart without angina pectoris    Sick sinus syndrome    Essential hypertension    Cholelithiasis    Paralysis    History of urinary retention    Hypothyroidism    Dysphagia/CT noting diffusely thickened mid/distal esophagus.  Will hold Eliquis in anticipation of an EGD.  Prior to doing so, I will await medical/ID clearance from her Proteus bacteremia/UTI.  Anticipate procedure on Wednesday or Thursday.    Taiwo Sanchez MD  05/26/24  16:58 CDT    DISCLAIMER:    This physician works through a [employer] as an inpatient consultant gastroenterologist only and has no outpatient clinic for patient follow up.  Any results not available at time of inpatient discharge and/or GI clinic follow up should be managed by the hospitalist team, PCP, or outpatient gastroenterologist.

## 2024-05-26 NOTE — PLAN OF CARE
Goal Outcome Evaluation:  Plan of Care Reviewed With: patient        Progress: improving  Outcome Evaluation: IID; IV ABX; no request for pain medication this shift; purewick; Turn Q 2; wound to right hip; room air; resting between care; safety maintained

## 2024-05-26 NOTE — THERAPY EVALUATION
Acute Care - Occupational Therapy Initial Evaluation  [facility]     Patient Name: Tawny Shin  : 1955  MRN: [id number]  Today's Date: 2024  Onset of Illness/Injury or Date of Surgery: 24  Date of Referral to OT: 24  Referring Physician: Dr. Ambriz    Admit Date: 2024       ICD-10-CM ICD-9-CM   1. Hypervolemia, unspecified hypervolemia type  E87.70 276.69   2. Debility  R53.81 799.3   3. Esophageal dysphagia  R13.19 787.29     Patient Active Problem List   Diagnosis    T12 compression fracture, initial encounter    Chronic embolism and thrombosis of unspecified deep veins of left lower extremity    Chronic anticoagulation    Iron deficiency anemia    Osteoporosis    E coli bacteremia    Epidural hematoma    Pleural effusion, left    Functional neurological symptom disorder with weakness or paralysis    Overweight (BMI 25.0-29.9)    Rheumatoid arthritis involving multiple sites    (HFpEF) heart failure with preserved ejection fraction    Venous insufficiency    Coronary artery disease involving native coronary artery of native heart without angina pectoris    Sick sinus syndrome    Essential hypertension    Pressure injury of skin of heel    Chronic pain    Anemia of chronic disease    Cholelithiasis    Pressure injury of skin of buttock    Near functional paraplegia    Skin cancer    Squamous cell carcinoma of back    Hematoma    Right-sided chest wall pain    Hyperlipidemia LDL goal <70    Malodorous urine    Stage 3b chronic kidney disease    Cyst of buttocks    Sepsis due to Escherichia coli without acute organ dysfunction    Generalized weakness    Paralysis    History of urinary retention    Bacteremia due to Enterobacter species    Bacteremia    Hypothyroidism    Fluid overload     Past Medical History:   Diagnosis Date    Age-related osteoporosis with current pathological fracture 2020    Arthritis     Asthma     Bilateral bunions 2020    Cancer     Cardiac  "pacemaker syndrome 12/23/2020    Overview:  - heart block - implanted 11/16    Charcot's joint of foot, left 12/23/2020    Chronic deep vein thrombosis (DVT) of right lower extremity 06/23/2021    Chronic pain syndrome 06/22/2021    Chronic sinusitis     COPD (chronic obstructive pulmonary disease)     Coronary artery disease     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    Elevated cholesterol     Eustachian tube dysfunction     Heart disease     Herpes simplex     History of transfusion     Hyperlipidemia     Hypertension     Hypothyroidism 12/23/2020    Intrinsic asthma 12/23/2020    Knee dislocation     Labral tear of right hip joint     Laryngitis sicca     Laryngitis, chronic     Left carotid bruit 03/09/2016    MI (myocardial infarction)     Myalgia due to statin 06/25/2019    Open wound of right hip 09/14/2021    Osteomyelitis of right femur 07/06/2021    Otorrhea     Pacemaker 11/17/2016    Primary osteoarthritis of left knee 12/23/2020    Psoriasis vulgaris 12/23/2020    S/P coronary artery stent placement 03/09/2016    Sensorineural hearing loss     Seropositive rheumatoid arthritis of multiple sites 12/27/2019    Overview:  -myochrysine '93-'96 -methotrexate '96--->11/98;r/s  restarted 2/99--> 8/14 (anemia) -sulfasalazine- not effective -penicillamine 6/98-->10/98; no effect -leflunomide 11/98--> - Humira '13-->didn't take - Enbrel 12/14-->3/15- no effect!   Last Assessment & Plan:  - \"aching all over\" because she had to be off her anti-rheumatic drugs for 2 weeks in preparation for her R knee surgery - he    Sick sinus syndrome 12/27/2019    Sjogren's disease     Spondylolisthesis of lumbar region 01/17/2018    Syncope, recurrent 02/08/2021    Urinary tract infection     UTI (urinary tract infection) 04/19/2024     Past Surgical History:   Procedure Laterality Date    A-V CARDIAC PACEMAKER INSERTION  2016    ATRIAL CARDIAC PACEMAKER INSERTION      CARDIAC CATHETERIZATION      CATARACT EXTRACTION      " CERVICAL CORPECTOMY N/A 3/3/2021    Procedure: CERVICAL 6 CORPECTOMY WITH TITANIUM CAGE WITH NEURO MONITORING;  Surgeon: Bandar Shea MD;  Location:  [facility];  Service: Neurosurgery;  Laterality: N/A;    COLONOSCOPY  11/08/2011    One fold in the ascending colon which showed ulcer otherwise normal exam    COLONOSCOPY  11/12/2004    Normal exam repeat in five years    CORONARY ANGIOPLASTY WITH STENT PLACEMENT      X 2; 2013 & 2014    ENDOSCOPY  07/10/2014    Normal exam    FLAP LEG Right 9/14/2021    Procedure: RIGHT GLUTEAL FASCIOCUTANEOUS ADVANCEMENT FLAP AND RIGHT TENSOR FASCIAL JESSICA FLAP;  Surgeon: Amadeo Turner MD;  Location:  [facility];  Service: Plastics;  Laterality: Right;    HIP ABDUCTION TENOTOMY BILATERAL Right 1/14/2021    Procedure: RIGHT HIP GLUTEUS MEDLUS / MINIMUS REPAIR, POSSIBLE ACHILLES ALLOGRAFT;  Surgeon: Nino Carlson MD;  Location:  [facility];  Service: Orthopedics;  Laterality: Right;    INCISION AND DRAINAGE ABSCESS Right 6/4/2022    Procedure: INCISION AND DRAINAGE ABSCESS right hip;  Surgeon: Magda Salcido MD;  Location:  [facility];  Service: General;  Laterality: Right;    INCISION AND DRAINAGE ABSCESS Right 6/10/2022    Procedure: RIGHT HIP INCISION AND DRAINAGE. MD NEEDS 3L VANC IRRIGATION, CURRETTES, DAICANS, KERLEX ROLLS;  Surgeon: Amadeo Turner MD;  Location:  [facility];  Service: Plastics;  Laterality: Right;    INCISION AND DRAINAGE HIP Right 2/9/2021    Procedure: HIP INCISION AND DRAINAGE;  Surgeon: Nino Carlson MD;  Location:  [facility];  Service: Orthopedics;  Laterality: Right;    INCISION AND DRAINAGE LEG Right 10/24/2021    Procedure: INCISION AND DRAINAGE LOWER EXTREMITY;  Surgeon: Amadeo Turner MD;  Location:  [facility];  Service: Plastics;  Laterality: Right;    INCISION AND DRAINAGE OF WOUND Right 7/8/2021    Procedure: INCISION AND DRAINAGE WOUND RIGHT HIP;  Surgeon: James Huntley MD;  Location:  [facility];  Service: Orthopedics;   Laterality: Right;    JOINT REPLACEMENT      KYPHOPLASTY WITH BIOPSY Bilateral 10/26/2021    Procedure: THOARCIC 12 KYPHOPLASTY WITH BIOPSY;  Surgeon: Bandar Shea MD;  Location:  [facility];  Service: Neurosurgery;  Laterality: Bilateral;    LEG DEBRIDEMENT Right 9/14/2021    Procedure: DEBRIDEMENT OF RIGHT HIP WOUND, RIGHT GLUTEAL FASCIOCUTANEOUS ADVANCEMENT FLAP AND RIGHT TENSOR FASCIAL JESSICA FLAP;  Surgeon: Amadeo Turner MD;  Location:  [facility];  Service: Plastics;  Laterality: Right;    LUMBAR DISCECTOMY Right 3/23/2021    Procedure: LUMBAR DISCECTOMY MICRO, Lumbar 1/2 right;  Surgeon: Bandar Shea MD;  Location:  [facility];  Service: Neurosurgery;  Laterality: Right;    LUMBAR FUSION N/A 1/19/2018    Procedure: L3-4,L4-5 DECOMPRESSION, POSTERIOR SPINAL FUSION WITH INSTRUMENTATION;  Surgeon: Fortino Oropeza MD;  Location:  [facility];  Service:     LUMBAR LAMINECTOMY WITH FUSION Left 1/17/2018    Procedure: LEFT L3-4 L4-5 LATERAL LUMBAR INTERBODY FUSION;  Surgeon: Fortino Oropeza MD;  Location:  [facility];  Service:     MASS EXCISION Right 4/23/2024    Procedure: RIGHT BUTTOCK MASS EXCISION;  Surgeon: Moises Keyes MD;  Location:  [facility];  Service: General;  Laterality: Right;    MYRINGOTOMY W/ TUBES  09/04/2014    TUBES NO LONGER IN PLACE    OTHER SURGICAL HISTORY      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 so hardware was removed and spacers were placed    REPLACEMENT TOTAL KNEE Right          OT ASSESSMENT FLOWSHEET (Last 12 Hours)       OT Evaluation and Treatment       Row Name 05/26/24 0801                   OT Time and Intention    Subjective Information complains of;pain;weakness;fatigue  -AC        Document Type evaluation  -AC        Mode of Treatment occupational therapy  -AC           General Information    Patient Profile Reviewed yes  -AC        Onset of Illness/Injury or Date of Surgery 05/24/24  -AC        Referring Physician Dr. Ambriz  -AC        Prior Level of Function  independent:;all household mobility;gait;transfer;bed mobility;feeding;grooming;min assist:;dressing;bathing  -        Equipment Currently Used at Home walker, rolling;shower chair;grab bar;wheelchair, motorized  -        Pertinent History of Current Functional Problem generalized weakness, edema; Dx: fluid overload  -AC        Existing Precautions/Restrictions fall  -AC        Risks Reviewed LOB;increased discomfort;change in vital signs;increased drainage;lines disloged;patient:  -AC        Benefits Reviewed improve function;increase independence;increase strength;increase balance;decrease pain;decrease risk of DVT;improve skin integrity;increase knowledge;patient:  -AC        Barriers to Rehab medically complex;previous functional deficit  -AC           Living Environment    Current Living Arrangements home  walk in shower  -        Home Accessibility wheelchair accessible  -        People in Home alone  24/7 caregivers  -           Pain Assessment    Pretreatment Pain Rating 4/10  -AC        Posttreatment Pain Rating 4/10  -AC        Pain Location - Side/Orientation Bilateral  -AC        Pain Location - knee  -AC        Pre/Posttreatment Pain Comment headache  -AC        Pain Intervention(s) Repositioned;Rest;Ambulation/increased activity  -AC           Cognition    Orientation Status (Cognition) oriented x 4  -AC        Follows Commands (Cognition) WFL  -AC        Personal Safety Interventions fall prevention program maintained;gait belt;muscle strengthening facilitated;nonskid shoes/slippers when out of bed;supervised activity  -AC           Range of Motion Comprehensive    General Range of Motion bilateral upper extremity ROM WFL  -           Strength Comprehensive (MMT)    Comment, General Manual Muscle Testing (MMT) Assessment 4-/5 BUE  -           Activities of Daily Living    BADL Assessment/Intervention lower body dressing;toileting  -           Lower Body Dressing Assessment/Training     [place] Level (Lower Body Dressing) don;doff;socks;maximum assist (25% patient effort)  -        Position (Lower Body Dressing) edge of bed sitting  -           Toileting Assessment/Training    [place] Level (Toileting) adjust/manage clothing;perform perineal hygiene;maximum assist (25% patient effort)  -        Position (Toileting) supine  -           BADL Safety/Performance    Impairments, BADL Safety/Performance balance;pain;endurance/activity tolerance;strength  -           Bed Mobility    Bed Mobility supine-sit;sit-supine;scooting/bridging  -        Scooting/Bridging [place] (Bed Mobility) maximum assist (25% patient effort);2 person assist  -        Supine-Sit [place] (Bed Mobility) minimum assist (75% patient effort)  -        Sit-Supine [place] (Bed Mobility) contact guard  -        Assistive Device (Bed Mobility) bed rails;head of bed elevated;draw sheet  -           Functional Mobility    Functional Mobility- Ind. Level unable to perform  -        Functional Mobility- Device --  -           Transfer Assessment/Treatment    Transfers sit-stand transfer;stand-sit transfer  -        Comment, (Transfers) transfers x3, only briefly able to stand before sitting back on bed  -           Sit-Stand Transfer    Sit-Stand [place] (Transfers) moderate assist (50% patient effort);2 person assist;verbal cues  -           Stand-Sit Transfer    Stand-Sit [place] (Transfers) minimum assist (75% patient effort)  -           Safety Issues, Functional Mobility    Impairments Affecting Function (Mobility) balance;pain;strength;endurance/activity tolerance  -           Balance    Balance Assessment sitting static balance;sitting dynamic balance;standing static balance;standing dynamic balance  -        Static Sitting Balance standby assist  -        Dynamic Sitting Balance standby assist  -        Position, Sitting Balance sitting edge of bed  -         Static Standing Balance minimal assist  -AC        Position/Device Used, Standing Balance walker, front-wheeled  -AC           Wound 04/23/24 0936 Right gluteal Incision    Wound - Properties Group Placement Date: 04/23/24  -EP Placement Time: 0936 -EP Present on Original Admission: N  -EP Side: Right  -EP Location: gluteal  -EP Primary Wound Type: Incision  -EP    Retired Wound - Properties Group Placement Date: 04/23/24  -EP Placement Time: 0936  -EP Present on Original Admission: N  -EP Side: Right  -EP Location: gluteal  -EP Primary Wound Type: Incision  -EP    Retired Wound - Properties Group Date first assessed: 04/23/24  -EP Time first assessed: 0936  -EP Present on Original Admission: N  -EP Side: Right  -EP Location: gluteal  -EP Primary Wound Type: Incision  -EP       Plan of Care Review    Plan of Care Reviewed With patient  -AC        Progress no change  -AC        Outcome Evaluation OT eval completed.  Pt alert and oriented x4.  Pt lives at home with 24/7 caregivers and is typically independent with mobility and requires Jose R for ADL.  Pt reports her caregivers tend to assist her more than she needs.  Educated pt on trying to do as many activities independently as possible as long as she is safe.  Pt has pitting edema in B LE.  She came to EOB with JoseR .  Donned socks with maxA, unable to lift LEs due to weakness and edema.  Also needed maxA for hygiene and clothing mgmt after voiding in bed.  Pt transferred x3 with modAx2 but was unable to maintain standing position due to weakness and sat back down on bed each time.  Unable to take steps today to chair.  Returned to bed with CGA.  OT will continue to treat pt to address decreased strength, endurance, and independence with ADL.  Recommend SNF pending progress.  -AC           Positioning and Restraints    Pre-Treatment Position in bed  -AC        Post Treatment Position bed  -AC        In Bed fowlers;call light within reach;encouraged to call for  assist;with nsg;side rails up x2;legs elevated  -AC           Therapy Assessment/Plan (OT)    Date of Referral to OT 05/24/24  -AC        OT Diagnosis decreased adl  -AC        Rehab Potential (OT) good, to achieve stated therapy goals  -AC        Criteria for Skilled Therapeutic Interventions Met (OT) yes;meets criteria;skilled treatment is necessary  -AC        Therapy Frequency (OT) 5 times/wk  -AC        Predicted Duration of Therapy Intervention (OT) 10 days  -AC        Activity Limitations Related to Problem List (OT) BADLs not performed adequately or safely  -AC        Planned Therapy Interventions (OT) activity tolerance training;BADL retraining;functional balance retraining;occupation/activity based interventions;patient/caregiver education/training;strengthening exercise;transfer/mobility retraining  -AC           OT Goals    Transfer Goal Selection (OT) transfer, OT goal 1  -AC        Bathing Goal Selection (OT) bathing, OT goal 1  -AC        Dressing Goal Selection (OT) dressing, OT goal 1  -AC        Strength Goal Selection (OT) strength, OT goal 1  -AC           Transfer Goal 1 (OT)    Activity/Assistive Device (Transfer Goal 1, OT) bed-to-chair/chair-to-bed;commode, bedside without drop arms;shower chair  -AC        Cash Level/Cues Needed (Transfer Goal 1, OT) minimum assist (75% or more patient effort)  -AC        Time Frame (Transfer Goal 1, OT) long term goal (LTG);10 days  -AC        Progress/Outcome (Transfer Goal 1, OT) new goal  -AC           Bathing Goal 1 (OT)    Activity/Device (Bathing Goal 1, OT) bathing skills, all;shower chair  -AC        [place]ash Level/Cues Needed (Bathing Goal 1, OT) minimum assist (75% or more patient effort)  -AC        Time Frame (Bathing Goal 1, OT) long term goal (LTG);10 days  -AC           Dressing Goal 1 (OT)    Activity/Device (Dressing Goal 1, OT) --  -AC        Time Frame (Dressing Goal 1, OT) --  -AC        Progress/Outcome (Dressing Goal 1, OT)  --  -           Strength Goal 1 (OT)    Strength Goal 1 (OT) Increased BUE strength to 4+/5 to increase independence with ADL  -        Time Frame (Strength Goal 1, OT) long term goal (LTG);10 days  -        Progress/Outcome (Strength Goal 1, OT) new goal  -                  User Key  (r) = Recorded By, (t) = Taken By, (c) = Cosigned By      Initials Name Effective Dates     Sebastián Howe, OTR/L, MARI 02/03/23 -     Sonny Thompson RN 01/22/24 -                      Occupational Therapy Education       Title: PT OT SLP Therapies (Done)       Topic: Occupational Therapy (Done)       Point: ADL training (Done)       Description:   Instruct learner(s) on proper safety adaptation and remediation techniques during self care or transfers.   Instruct in proper use of assistive devices.                  Learning Progress Summary             Patient Acceptance, E, VU by  at 5/26/2024 0855                         Point: Home exercise program (Done)       Description:   Instruct learner(s) on appropriate technique for monitoring, assisting and/or progressing therapeutic exercises/activities.                  Learning Progress Summary             Patient Acceptance, E, VU by  at 5/26/2024 0855                         Point: Precautions (Done)       Description:   Instruct learner(s) on prescribed precautions during self-care and functional transfers.                  Learning Progress Summary             Patient Acceptance, E, VU by  at 5/26/2024 0855                         Point: Body mechanics (Done)       Description:   Instruct learner(s) on proper positioning and spine alignment during self-care, functional mobility activities and/or exercises.                  Learning Progress Summary             Patient Acceptance, E, VU by  at 5/26/2024 0855                                         User Key       Initials Effective Dates Name Provider Type Discipline     02/03/23 -  Sebastián Howe OTR/L, MARI  Occupational Therapist OT                      OT Recommendation and Plan  Planned Therapy Interventions (OT): activity tolerance training, BADL retraining, functional balance retraining, occupation/activity based interventions, patient/caregiver education/training, strengthening exercise, transfer/mobility retraining  Therapy Frequency (OT): 5 times/wk  Plan of Care Review  Plan of Care Reviewed With: patient  Progress: no change  Outcome Evaluation: OT cristofer completed.  Pt alert and oriented x4.  Pt lives at home with 24/7 caregivers and is typically independent with mobility and requires Jose R for ADL.  Pt reports her caregivers tend to assist her more than she needs.  Educated pt on trying to do as many activities independently as possible as long as she is safe.  Pt has pitting edema in B LE.  She came to EOB with Jose R.  Donned socks with maxA, unable to lift LEs due to weakness and edema.  Also needed maxA for hygiene and clothing mgmt after voiding in bed.  Pt transferred x3 with modAx2 but was unable to maintain standing position due to weakness and sat back down on bed each time.  Unable to take steps today to chair.  Returned to bed with CGA.  OT will continue to treat pt to address decreased strength, endurance, and independence with ADL.  Recommend SNF pending progress.  Plan of Care Reviewed With: patient  Outcome Evaluation: OT cristofer completed.  Pt alert and oriented x4.  Pt lives at home with 24/7 caregivers and is typically independent with mobility and requires Jose R for ADL.  Pt reports her caregivers tend to assist her more than she needs.  Educated pt on trying to do as many activities independently as possible as long as she is safe.  Pt has pitting edema in B LE.  She came to EOB with Jose R.  Donned socks with maxA, unable to lift LEs due to weakness and edema.  Also needed maxA for hygiene and clothing mgmt after voiding in bed.  Pt transferred x3 with modAx2 but was unable to maintain standing  position due to weakness and sat back down on bed each time.  Unable to take steps today to chair.  Returned to bed with CGA.  OT will continue to treat pt to address decreased strength, endurance, and independence with ADL.  Recommend SNF pending progress.     Outcome Measures       Row Name 05/26/24 0801             How much help from another is currently needed...    Putting on and taking off regular lower body clothing? 2  -AC      Bathing (including washing, rinsing, and drying) 2  -AC      Toileting (which includes using toilet bed pan or urinal) 2  -AC      Putting on and taking off regular upper body clothing 3  -AC      Taking care of personal grooming (such as brushing teeth) 4  -AC      Eating meals 4  -AC      AM-PAC 6 Clicks Score (OT) 17  -AC         Functional Assessment    Outcome Measure Options AM-PAC 6 Clicks Daily Activity (OT)  -                User Key  (r) = Recorded By, (t) = Taken By, (c) = Cosigned By      Initials Name Provider Type    Sebastián Catherine OTR/L, MARI Occupational Therapist                    Time Calculation:    Time Calculation- OT       Row Name 05/26/24 0855             Time Calculation- OT    OT Start Time 0801  -      OT Stop Time 0855  -      OT Time Calculation (min) 54 min  -      OT Received On 05/26/24  -      OT Goal Re-Cert Due Date 06/05/24  -                User Key  (r) = Recorded By, (t) = Taken By, (c) = Cosigned By      Initials Name Provider Type    Sebastián Catherine OTR/L, MARI Occupational Therapist                  Therapy Charges for Today       Code Description Service Date Service Provider Modifiers Qty    [id number]  OT EVAL LOW COMPLEXITY 4 5/26/2024 Sebastián Howe OTR/L, MARI GO 1                 KATHYA Edward/L, CNT  5/26/2024

## 2024-05-27 VITALS
OXYGEN SATURATION: 88 % | DIASTOLIC BLOOD PRESSURE: 78 MMHG | RESPIRATION RATE: 32 BRPM | SYSTOLIC BLOOD PRESSURE: 160 MMHG | TEMPERATURE: 98.4 F | HEART RATE: 92 BPM

## 2024-05-27 LAB
ANION GAP SERPL CALCULATED.3IONS-SCNC: 10 MMOL/L (ref 5–15)
BACTERIA SPEC AEROBE CULT: ABNORMAL
BUN SERPL-MCNC: 21 MG/DL (ref 8–23)
BUN/CREAT SERPL: 18.4 (ref 7–25)
CALCIUM SPEC-SCNC: 7.9 MG/DL (ref 8.6–10.5)
CHLORIDE SERPL-SCNC: 106 MMOL/L (ref 98–107)
CO2 SERPL-SCNC: 25 MMOL/L (ref 22–29)
CREAT SERPL-MCNC: 1.14 MG/DL (ref 0.57–1)
DEPRECATED RDW RBC AUTO: 60.4 FL (ref 37–54)
EGFRCR SERPLBLD CKD-EPI 2021: 51.9 ML/MIN/1.73
ERYTHROCYTE [DISTWIDTH] IN BLOOD BY AUTOMATED COUNT: 17.4 % (ref 12.3–15.4)
GLUCOSE SERPL-MCNC: 84 MG/DL (ref 65–99)
GRAM STN SPEC: ABNORMAL
HCT VFR BLD AUTO: 25.1 % (ref 34–46.6)
HGB BLD-MCNC: 7.5 G/DL (ref 12–15.9)
ISOLATED FROM: ABNORMAL
MCH RBC QN AUTO: 28.5 PG (ref 26.6–33)
MCHC RBC AUTO-ENTMCNC: 29.9 G/DL (ref 31.5–35.7)
MCV RBC AUTO: 95.4 FL (ref 79–97)
PLATELET # BLD AUTO: 131 10*3/MM3 (ref 140–450)
PMV BLD AUTO: 11.8 FL (ref 6–12)
POTASSIUM SERPL-SCNC: 3.5 MMOL/L (ref 3.5–5.2)
RBC # BLD AUTO: 2.63 10*6/MM3 (ref 3.77–5.28)
SODIUM SERPL-SCNC: 141 MMOL/L (ref 136–145)
WBC NRBC COR # BLD AUTO: 5.7 10*3/MM3 (ref 3.4–10.8)

## 2024-05-27 PROCEDURE — 80048 BASIC METABOLIC PNL TOTAL CA: CPT | Performed by: FAMILY MEDICINE

## 2024-05-27 PROCEDURE — 99232 SBSQ HOSP IP/OBS MODERATE 35: CPT | Performed by: INTERNAL MEDICINE

## 2024-05-27 PROCEDURE — 63710000001 PREDNISONE PER 5 MG: Performed by: FAMILY MEDICINE

## 2024-05-27 PROCEDURE — 97530 THERAPEUTIC ACTIVITIES: CPT

## 2024-05-27 PROCEDURE — 25010000002 CEFEPIME PER 500 MG: Performed by: FAMILY MEDICINE

## 2024-05-27 PROCEDURE — 85027 COMPLETE CBC AUTOMATED: CPT | Performed by: FAMILY MEDICINE

## 2024-05-27 RX ADMIN — FERROUS SULFATE TAB 325 MG (65 MG ELEMENTAL FE) 325 MG: 325 (65 FE) TAB at 08:56

## 2024-05-27 RX ADMIN — LEVOTHYROXINE SODIUM 75 MCG: 0.07 TABLET ORAL at 05:10

## 2024-05-27 RX ADMIN — CARVEDILOL 25 MG: 25 TABLET, FILM COATED ORAL at 16:46

## 2024-05-27 RX ADMIN — LOSARTAN POTASSIUM 50 MG: 50 TABLET, FILM COATED ORAL at 08:56

## 2024-05-27 RX ADMIN — ISOSORBIDE MONONITRATE 60 MG: 60 TABLET, EXTENDED RELEASE ORAL at 08:56

## 2024-05-27 RX ADMIN — TRAMADOL HYDROCHLORIDE 50 MG: 50 TABLET ORAL at 20:49

## 2024-05-27 RX ADMIN — DIPHENHYDRAMINE HYDROCHLORIDE AND LIDOCAINE HYDROCHLORIDE AND ALUMINUM HYDROXIDE AND MAGNESIUM HYDRO 5 ML: KIT at 11:41

## 2024-05-27 RX ADMIN — DIPHENHYDRAMINE HYDROCHLORIDE AND LIDOCAINE HYDROCHLORIDE AND ALUMINUM HYDROXIDE AND MAGNESIUM HYDRO 5 ML: KIT at 20:49

## 2024-05-27 RX ADMIN — PREDNISONE 5 MG: 5 TABLET ORAL at 08:56

## 2024-05-27 RX ADMIN — FOLIC ACID 1000 MCG: 1 TABLET ORAL at 08:56

## 2024-05-27 RX ADMIN — CEFEPIME 2000 MG: 2 INJECTION, POWDER, FOR SOLUTION INTRAVENOUS at 03:44

## 2024-05-27 RX ADMIN — Medication 10 ML: at 20:49

## 2024-05-27 RX ADMIN — CARVEDILOL 25 MG: 25 TABLET, FILM COATED ORAL at 08:56

## 2024-05-27 RX ADMIN — FAMOTIDINE 20 MG: 10 INJECTION INTRAVENOUS at 08:55

## 2024-05-27 RX ADMIN — LEFLUNOMIDE 20 MG: 20 TABLET ORAL at 08:56

## 2024-05-27 RX ADMIN — ASPIRIN 81 MG: 81 TABLET, COATED ORAL at 08:56

## 2024-05-27 RX ADMIN — DIPHENHYDRAMINE HYDROCHLORIDE AND LIDOCAINE HYDROCHLORIDE AND ALUMINUM HYDROXIDE AND MAGNESIUM HYDRO 5 ML: KIT at 16:46

## 2024-05-27 RX ADMIN — DIPHENHYDRAMINE HYDROCHLORIDE AND LIDOCAINE HYDROCHLORIDE AND ALUMINUM HYDROXIDE AND MAGNESIUM HYDRO 5 ML: KIT at 03:44

## 2024-05-27 RX ADMIN — Medication 1 TABLET: at 08:56

## 2024-05-27 RX ADMIN — OXYCODONE HYDROCHLORIDE AND ACETAMINOPHEN 250 MG: 500 TABLET ORAL at 08:56

## 2024-05-27 RX ADMIN — DULOXETINE HYDROCHLORIDE 30 MG: 30 CAPSULE, DELAYED RELEASE ORAL at 08:56

## 2024-05-27 NOTE — PROGRESS NOTES
"INFECTIOUS DISEASES PROGRESS NOTE    Patient:  Tawny Shin  YOB: 1955  MRN: [id number]   Admit date: 2024   Admitting Physician: Kris Cade,*  Primary Care Physician: Moises Oliveira MD      Chief Complaint: \"feel achy\"        Interval History: Patient notes that she feels achy and overall poorly. Her caregiver noted that she was somewhat confused last Wednesday and not drinking as much.Then she said she had temp in upper 100 range and increased swelling and came to ER.           Allergies:   Allergies   Allergen Reactions    Atorvastatin Other (See Comments)     LEG CRAMPS      Amoxicillin Rash    Escitalopram Rash    Nabumetone Rash    Niacin Er Rash    Penicillin G Rash    Penicillins Rash    Simvastatin Rash       Current Scheduled Medications:   [Held by provider] apixaban, 5 mg, Oral, BID  ascorbic acid, 250 mg, Oral, Daily  aspirin, 81 mg, Oral, Daily  carvedilol, 25 mg, Oral, BID With Meals  cefepime, 2,000 mg, Intravenous, Q12H  DULoxetine, 30 mg, Oral, Daily  famotidine, 20 mg, Intravenous, Daily  ferrous sulfate, 325 mg, Oral, Daily With Breakfast  First Mouthwash (Magic Mouthwash), 5 mL, Swish & Spit, Q6H  folic acid, 1,000 mcg, Oral, Daily  isosorbide mononitrate, 60 mg, Oral, Daily  leflunomide, 20 mg, Oral, Daily  levothyroxine, 75 mcg, Oral, Q AM  Lidocaine, 2 patch, Transdermal, Q24H  losartan, 50 mg, Oral, Daily  multivitamin with minerals, 1 tablet, Oral, Daily  predniSONE, 5 mg, Oral, Daily  sodium chloride, 10 mL, Intravenous, Q12H      Current PRN Medications:    acetaminophen    albuterol    Biotene dry mouth    senna-docusate sodium **AND** polyethylene glycol **AND** bisacodyl **AND** bisacodyl    Diclofenac Sodium    magnesium hydroxide    nitroglycerin    ondansetron    sodium chloride    sodium chloride    sodium chloride    traMADol    valACYclovir            Objective     Vital Signs:  Temp (24hrs), Av.1 °F (36.7 °C), Min:97.8 °F (36.6 °C), " "Max:98.7 °F (37.1 °C)      /81 (BP Location: Left arm, Patient Position: Lying)   Pulse 77   Temp 98.7 °F (37.1 °C)   Resp 16   Ht 167.6 cm (66\")   Wt 96.1 kg (211 lb 13.8 oz)   LMP  (LMP Unknown)   SpO2 93%   BMI 34.20 kg/m²         Physical Exam:  Gen: Generally weak appearing lying in bed  RESP : effort even and unlabored  Abd: soft nontender          Results Review:    I reviewed the patient's new clinical results.    Lab Results:    CBC:   Lab Results   Lab 05/24/24  1051 05/25/24  0537 05/26/24 0419 05/27/24  0455   WBC 6.65 8.09 6.77 5.70   HEMOGLOBIN 8.2* 7.5* 7.7* 7.5*   HEMATOCRIT 26.7* 24.6* 25.1* 25.1*   PLATELETS 115* 114* 127* 131*        AutoDiff:   Lab Results   Lab 05/24/24  1051 05/25/24  0537   NEUTROPHIL % 66.3 67.5   LYMPHOCYTE % 20.3 17.8*   MONOCYTES % 11.3 12.7*   EOSINOPHIL % 0.2* 0.1*   BASOPHIL % 0.5 0.4   NEUTROS ABS 4.42 5.46   LYMPHS ABS 1.35 1.44   MONOS ABS 0.75 1.03*   EOS ABS 0.01 0.01   BASOS ABS 0.03 0.03        Manual Diff:    Lab Results   Lab 05/24/24  1051 05/25/24  0537   NEUTROS ABS 4.42 5.46           CMP:   Lab Results   Lab 05/24/24  1051 05/25/24  0537 05/26/24 0419 05/27/24  0455   SODIUM 136 141 140 141   POTASSIUM 3.6 3.5 3.0* 3.5   CHLORIDE 103 103 102 106   CO2 23.0 27.0 26.0 25.0   BUN 15 22 29* 21   CREATININE 1.08* 1.28* 1.52* 1.14*   CALCIUM 8.6 8.5* 7.9* 7.9*   BILIRUBIN 0.7 0.5  --   --    ALK PHOS 66 62  --   --    ALT (SGPT) 30 26  --   --    AST (SGOT) 39* 26  --   --    GLUCOSE 105* 106* 90 84       Estimated Creatinine Clearance: 53.6 mL/min (A) (by C-G formula based on SCr of 1.14 mg/dL (H)).    Culture Results:    Microbiology Results (last 10 days)       Procedure Component Value - Date/Time    Urine Culture - Urine, Urine, Clean Catch [[id number]]  (Abnormal)  (Susceptibility) Collected: 05/24/24 1312    Lab Status: Final result Specimen: Urine, Clean Catch Updated: 05/26/24 1110     Urine Culture >100,000 CFU/mL Proteus mirabilis    " Narrative:      Colonization of the urinary tract without infection is common. Treatment is discouraged unless the patient is symptomatic, pregnant, or undergoing an invasive urologic procedure.    Susceptibility        Proteus mirabilis      LICHA      Amoxicillin + Clavulanate Susceptible      Ampicillin Susceptible      Ampicillin + Sulbactam Susceptible      Cefazolin Susceptible      Cefepime Susceptible      Ceftazidime Susceptible      Ceftriaxone Susceptible      Gentamicin Susceptible      Levofloxacin Susceptible      Nitrofurantoin Resistant      Piperacillin + Tazobactam Susceptible      Trimethoprim + Sulfamethoxazole Susceptible                           Blood Culture - Blood, Arm, Right [[id number]]  (Normal) Collected: 05/24/24 1110    Lab Status: Preliminary result Specimen: Blood from Arm, Right Updated: 05/26/24 1146     Blood Culture No growth at 2 days    COVID-19 and FLU A/B PCR, 1 HR TAT - Swab, Nasopharynx [[id number]]  (Normal) Collected: 05/24/24 1106    Lab Status: Final result Specimen: Swab from Nasopharynx Updated: 05/24/24 1136     COVID19 Not Detected     Influenza A PCR Not Detected     Influenza B PCR Not Detected    Narrative:      Fact sheet for providers: https://www.fda.gov/media/081619/download    Fact sheet for patients: https://www.fda.gov/media/161968/download    Test performed by PCR.    Blood Culture - Blood, Hand, Digit Right [[id number]]  (Abnormal)  (Susceptibility) Collected: 05/24/24 1051    Lab Status: Final result Specimen: Blood from Hand, Digit Right Updated: 05/27/24 0519     Blood Culture Proteus mirabilis     Isolated from Aerobic Bottle     Gram Stain Aerobic Bottle Gram negative bacilli    Susceptibility        Proteus mirabilis      LICHA      Amoxicillin + Clavulanate Susceptible      Ampicillin Susceptible      Ampicillin + Sulbactam Susceptible      Cefepime Susceptible      Ceftazidime Susceptible      Ceftriaxone Susceptible      Gentamicin Susceptible       Levofloxacin Susceptible      Piperacillin + Tazobactam Susceptible      Trimethoprim + Sulfamethoxazole Susceptible                       Susceptibility Comments       Proteus mirabilis    Cefazolin sensitivity will not be reported for Enterobacteriaceae in non-urine isolates. If cefazolin is preferred, please call the microbiology lab to request an E-test.  With the exception of urinary-sourced infections, aminoglycosides should not be used as monotherapy.               Blood Culture ID, PCR - Blood, Hand, Digit Right [[id number]]  (Abnormal) Collected: 05/24/24 1051    Lab Status: Final result Specimen: Blood from Hand, Digit Right Updated: 05/25/24 0648     BCID, PCR Proteus spp. Identification by BCID2 PCR.     BOTTLE TYPE Aerobic Bottle    Narrative:      No resistance genes detected.                 Radiology:   Imaging Results (Last 72 Hours)       Procedure Component Value Units Date/Time    US Gallbladder [[id number]] Collected: 05/24/24 1425     Updated: 05/24/24 1429    Narrative:      EXAMINATION: US GALLBLADDER-     5/24/2024 1:01 PM     HISTORY: f/u on CTA chest abnormality; E87.70-Fluid overload,  unspecified; R53.81-Other malaise     Grey-scale and color flow ultrasound evaluation of the RUQ.     Appropriate size and position of the gallbladder.  9 mm gallstone.  No gallbladder wall thickening.  No biliary dilation.  CBD = 4 mm.     The visualized portion of the right hepatic lobe is normal.     The right kidney is normal, to the extent visualized.     No free fluid at the right upper quadrant.       Impression:      1. Gallstone with no sign of cholecystitis.                    This report was signed and finalized on 5/24/2024 2:26 PM by Dr. Jesus Manuel Santos MD.       CT Angiogram Chest [[id number]] Collected: 05/24/24 1322     Updated: 05/24/24 1332    Narrative:      EXAM: CT ANGIOGRAM CHEST- - 5/24/2024 11:42 AM     HISTORY: eval for PE; E87.70-Fluid overload, unspecified; R53.81-Other  malaise        COMPARISON: No existing relevant imaging studies available.     DOSE LENGTH PRODUCT: 224.56 mGy.cm. Automatic exposure control was  utilized to make radiation dose as low as reasonably achievable.     TECHNIQUE: Enhanced axial CT images obtained with multiplanar reformats.  3D postprocessing, including MIPs, performed and images saved to PACS.     FINDINGS:  AIRWAYS/PULMONARY PARENCHYMA: The central airways are midline and  patent. Expiratory phase of imaging.     Small bilateral pleural fluid. Small layering groundglass opacity at the  fissures, which could represent small pulmonary edema/fluid or  atelectasis. No suspicious pulmonary mass or nodule.      VESSELS: Contrast bolus is adequate. No pulmonary artery filling defect  to suggest pulmonary embolus. Aorta normal in course and caliber with  calcified atherosclerosis.     CARDIAC: Cardiomegaly. Heavily calcified coronary arteries verses stent  material. No pericardial effusion.       MEDIASTINUM: There is no mediastinal or hilar lymphadenopathy by CT size  criteria.     The mid and distal esophagus appears diffusely thickened measuring 2.5  cm in cross-section.     EXTRATHORACIC: The visualized portions of the thyroid gland are  unremarkable. No thoracic inlet or axillary adenopathy. Cardiac pulse  generator in the LEFT chest wall. Otherwise overlying soft tissues with  mild stranding, suggest body wall edema.     INCLUDED UPPER ABDOMEN: 7 mm gallstone at the proximal gallbladder neck  or cystic duct. Partially visualized gallbladder appears distended, not  optimally evaluated on this exam. Visualized portion of the liver,  pancreas, spleen, adrenal glands appear within normal limits.     OSSEOUS: Kyphoplasty changes at T12. Mild height loss of T10 appears  similar to 8/22/2023.          Impression:      1. Small bilateral pleural fluid and small layering groundglass opacity  at the fissures, could represent small pulmonary edema/fluid or  atelectasis.  2.  Gallstone at the proximal gallbladder neck or cystic duct. Partially  visualized gallbladder appears distended, not optimally evaluated on  this exam. Correlate with patient symptoms and consider dedicated  ultrasound.  3. Diffuse esophageal thickening of the mid and distal esophagus. This  is new compared to 4/22/2022. Recommend referral to gastroenterology for  consideration of direct visualization.     This report was signed and finalized on 5/24/2024 1:29 PM by Dr Paulina Tavares MD.       XR Chest 1 View [[id number]] Collected: 05/24/24 1143     Updated: 05/24/24 1147    Narrative:      XR CHEST 1 VW-     HISTORY: shortness of breath     COMPARISON: 5/2/2024     FINDINGS: Frontal view the chest obtained.     Diminished level of inspiration. Similar positioning of the dual-lead  left subclavian cardiac pacer leads. Stable granulomatous calcifications  right hemithorax. Similar cardiomegaly. Borderline prominent pulmonary  vascular structures may relate to vascular crowding versus mild venous  congestion. No lobar consolidation. No pleural effusion or pneumothorax.  Postoperative changes cervical spine. Arthritic changes of the  shoulders.       Impression:      1. Low lung volumes. Stable cardiomegaly with similar positioning of the  left subclavian cardiac pacer leads. Vascular crowding versus mild  venous congestion. No consolidation.        This report was signed and finalized on 5/24/2024 11:44 AM by Dr. Trinity Gómez MD.                   Active Hospital Problems    Diagnosis     **Fluid overload     Hypothyroidism     Paralysis     History of urinary retention     Cholelithiasis     Essential hypertension     Coronary artery disease involving native coronary artery of native heart without angina pectoris     Sick sinus syndrome     Venous insufficiency     Overweight (BMI 25.0-29.9)     Rheumatoid arthritis involving multiple sites     (HFpEF) heart failure with preserved ejection fraction     Chronic  anticoagulation     Iron deficiency anemia     Osteoporosis     Chronic embolism and thrombosis of unspecified deep veins of left lower extremity     T12 compression fracture, initial encounter        IMPRESSION:  Proteus bacteremia secondary to Proteus UTI   HX of recurrent UTI - different organisms  HX CRE bacteremia  Remote HX of MSSA bacteremia  Immunocompromised patient with RA on leflunamide/prednisone  PINKY - received lasix 80 mg iv X 1 -improved  Anemia  Thrombocytopenia      RECOMMENDATION:   discontinue cefepime   Narrow to ceftriaxone   Reviewed with patient need to be aggressive about trying to reduce recurrent infections by drinking approximately 2 L of water a day, intravaginal estrogen, consideration for methenamine hippurate. Her caregiver seemed eager to help.  Contact isolation given hx of MDRO           Maggie Bang MD  05/27/24  09:19 CDT

## 2024-05-27 NOTE — HOME HEALTH
FOCUS OF CARE/SKILLED NEED: CHF    TEACHING/INTERVENTIONS: Patient unable to get up out of bed this morning, vomiting several time this morning.  Patient incontinent of both bowel and urine. This nurse and caregivers cleaned patient and repositioned in bed.  Patient noted with lungs diminished, spo2 on RA 89-91% and respiration elevated.  911 was called by caregiver, EMS arrived and patient transferred to [facility].    PROGRESS TOWARD GOALS: ongoing    PHYSICIAN CONTACT: none    INSURANCE CHANGES? none    MEDICATION CHANGES SINCE LAST VISIT? none    PLANS FOR NEXT VISIT: f/u from hospital stay    PRE-SCREEN FOR COVID? YES, NO S/S OF COVID, NO RECENT EXPOSURES none expressed

## 2024-05-27 NOTE — PLAN OF CARE
Goal Outcome Evaluation: No complaints of pain this shift. Patient stated that she didn't need to have the lidocaine patches this shift, she states she only uses them when she is in pain.  Patient has around the clock sitters that stay with her. Takes med whole in applesauce. Patient safety to be maintained this shift, continue to monitor and report abnormal to provider.

## 2024-05-27 NOTE — PLAN OF CARE
Goal Outcome Evaluation:  Plan of Care Reviewed With: patient        Progress: improving  Outcome Evaluation: Pt had no c/o pain and was min x1 supine to sit.Pt was min x2 to stand with RW x 3 reps with sitter assisting.Pt attempted but was unable to take side steps with RW but she was able to scoot up towards HOB in seated position.Pt would benefit from continued PT.

## 2024-05-27 NOTE — PROGRESS NOTES
[facility] Medicine Services  INPATIENT PROGRESS NOTE    Patient Name: Tawny Shin  Date of Admission: 5/24/2024  Today's Date: 05/27/24  Length of Stay: 3  Primary Care Physician: Moises Oliveira MD    Subjective   Chief Complaint: Weakness   HPI   Resting comfortably, no new complaints.     Review of Systems   All pertinent negatives and positives are as above. All other systems have been reviewed and are negative unless otherwise stated.     Objective    Temp:  [97.8 °F (36.6 °C)-98.7 °F (37.1 °C)] 98.7 °F (37.1 °C)  Heart Rate:  [76-79] 77  Resp:  [16] 16  BP: (124-170)/(48-81) 170/81  Physical Exam  Vitals and nursing note reviewed.   Constitutional:       Comments: Chronically ill.   HENT:      Head: Normocephalic.   Eyes:      Conjunctiva/sclera: Conjunctivae normal.      Pupils: Pupils are equal, round, and reactive to light.   Cardiovascular:      Rate and Rhythm: Normal rate and regular rhythm.      Heart sounds: Normal heart sounds.   Pulmonary:      Effort: Pulmonary effort is normal. No respiratory distress.      Breath sounds: Normal breath sounds.   Abdominal:      General: Bowel sounds are normal. There is no distension.      Palpations: Abdomen is soft.      Tenderness: There is no abdominal tenderness.      Comments: Obesity .   Musculoskeletal:         General: No swelling.      Cervical back: Neck supple.      Comments: Deformities of the hand from arthritis.   Skin:     General: Skin is warm and dry.      Capillary Refill: Capillary refill takes 2 to 3 seconds.      Findings: No rash.   Neurological:      Mental Status: She is alert and oriented to person, place, and time.      Motor: Weakness present.      Coordination: Coordination abnormal.      Gait: Gait abnormal.   Psychiatric:         Mood and Affect: Mood normal.         Behavior: Behavior normal.         Thought Content: Thought content normal.       Results Review:  I have reviewed the  labs, radiology results, and diagnostic studies.    Laboratory Data:   Results from last 7 days   Lab Units 05/27/24  0455 05/26/24 0419 05/25/24  0537   WBC 10*3/mm3 5.70 6.77 8.09   HEMOGLOBIN g/dL 7.5* 7.7* 7.5*   HEMATOCRIT % 25.1* 25.1* 24.6*   PLATELETS 10*3/mm3 131* 127* 114*        Results from last 7 days   Lab Units 05/27/24  0455 05/26/24  0419 05/25/24  0537 05/24/24  1051   SODIUM mmol/L 141 140 141 136   POTASSIUM mmol/L 3.5 3.0* 3.5 3.6   CHLORIDE mmol/L 106 102 103 103   CO2 mmol/L 25.0 26.0 27.0 23.0   BUN mg/dL 21 29* 22 15   CREATININE mg/dL 1.14* 1.52* 1.28* 1.08*   CALCIUM mg/dL 7.9* 7.9* 8.5* 8.6   BILIRUBIN mg/dL  --   --  0.5 0.7   ALK PHOS U/L  --   --  62 66   ALT (SGPT) U/L  --   --  26 30   AST (SGOT) U/L  --   --  26 39*   GLUCOSE mg/dL 84 90 106* 105*       Culture Data:   Blood Culture   Date Value Ref Range Status   05/24/2024 No growth at 2 days  Preliminary   05/24/2024 Proteus mirabilis (C)  Final     Urine Culture   Date Value Ref Range Status   05/24/2024 >100,000 CFU/mL Proteus mirabilis (A)  Final       Radiology Data:   Imaging Results (Last 24 Hours)       ** No results found for the last 24 hours. **            I have reviewed the patient's current medications.     Assessment/Plan   Assessment  Active Hospital Problems    Diagnosis     **Fluid overload     Hypothyroidism     Paralysis     History of urinary retention     Cholelithiasis     Essential hypertension     Coronary artery disease involving native coronary artery of native heart without angina pectoris     Sick sinus syndrome     Venous insufficiency     Overweight (BMI 25.0-29.9)     Rheumatoid arthritis involving multiple sites     (HFpEF) heart failure with preserved ejection fraction     Chronic anticoagulation     Iron deficiency anemia     Osteoporosis     Chronic embolism and thrombosis of unspecified deep veins of left lower extremity     T12 compression fracture, initial encounter        Treatment  "Plan  Shortness of breath.  Vascular congestion.  Patient received 80 of Lasix in ER. Improved.  Chest x-ray-Low lung volumes. Stable cardiomegaly with similar positioning of the  left subclavian cardiac pacer leads- Vascular crowding versus mild venous congestion,  No consolidation.  CT of the chest-Small bilateral pleural fluid and small layering groundglass opacity  at the fissures- could represent small pulmonary edema/fluid or atelectasis, Gallstone at the proximal gallbladder neck or cystic duct- Partially visualized gallbladder appears distended- not optimally evaluated on this exam, Diffuse esophageal thickening of the mid and distal esophagus- is new compared to 4/22/2022, recommend referral to gastroenterology for consideration of direct visualization.  Patient is on room air.     Gallstone proximal gallbladder neck and cystic duct/diffuse esophageal thickening of mid and distal esophagus. GI consult input noted  Ultrasound the gallbladder-Gallstone with no sign of cholecystitis.   Dysphagia/CT noted thickened mid/distal esophagus  GI recommendation-no additional workup for cholelithiasis until patients become symptomatic, dysphagia for last couple months-plan for upper endoscopy Wednesday or Thursdayt once respiratory status improved, outpatient colonoscopy because this past due.      UTI.     Infect disease consult input noted.   Cefepime narrowed to Rocephin  \"reduce recurrent infections by drinking approximately 2 L of water a day, intravaginal estrogen, consideration for methenamine hippurate\"  History of CRE.      Chronic stage IIIb renal failure.  Creatinine increased to 1.52, now 1.14, probably secondary to Lasix one-time dose in ER 80 mg.     Rheumatoid arthritis/Hx Sjogren .  Biotene.  Magic mouthwash. Arava.  Prednisone daily.     History of DVT . Eliquis.     CAD/hypertension/hyperlipidemia/pacemaker due to history of sick sinus syndrome.  Aspirin. .  BNP 5000 in ER.  Patient received 80 of " Lasix in the ER.  Imdur .  Continue Cozaar.  Nitro as needed.  Coreg.  Daily weight.  Strict in and out.    Allergic to statin     Depression/anxiety.  Cymbalta.     Hypokalemia.  P.o. potassium.  Magnesium level 1.8     Anemia.  Hemoglobin stable..  No sign of acute bleed.  Folic acid.  Iron sulfate.     Thrombocytopenia.  Platelet counts increasing.     Hypothyroidism . Synthroid.  TSH-normal.     Reflux.  Tums.  Zofran as needed.  Pepcid IV to PO     Constipation.  Magnesium oxide as needed.  Constipation protocol  as needed.  .     Pain.  Voltaren gel as needed.  Lidocaine patch as needed.  Ultram as needed.     Nutrition . Folic acid.  Cardiac diet.  Boost supplement.  Vitamin C.     Deconditioning.  PT and OT consult.  Patient get around with a walker at baseline.     Dr. Shin's mom.     Blood cultures Proteus 1 out of 2 bottles.  Urine culture-Proteus from UTI.      Medical Decision Making  Number and Complexity of problems: Shortness of breath/CHF exacerbation/gallstones/thickening esophagus/fail to thrive/rheumatoid arthritis/CAD/  Differential Diagnosis: None     Conditions and Status        Condition is unchanged.     MDM Data  External documents reviewed: Previous note .  Cardiac tracing (EKG, telemetry) interpretation: Sinus rhythm .  Radiology interpretation: CT scan/x-ray  Labs reviewed: Laboratory  Any tests that were considered but not ordered: Laboratory in AM     Decision rules/scores evaluated (example [id number], Wells, etc): None     Discussed with: Patient and caregiver.     Care Planning  Shared decision making: Patient and caregiver.  Code status and discussions: Full code.     Disposition  Social Determinants of Health that impact treatment or disposition: From home.  Estimated length of stay is 1 to 4 days.        Electronically signed by Kris Cade MD, 05/27/24, 11:23 CDT.

## 2024-05-27 NOTE — THERAPY TREATMENT NOTE
Acute Care - Physical Therapy Treatment Note  [facility]     Patient Name: Tawny Shin  : 1955  MRN: [id number]  Today's Date: 2024   Onset of Illness/Injury or Date of Surgery: 24  Visit Dx:     ICD-10-CM ICD-9-CM   1. Hypervolemia, unspecified hypervolemia type  E87.70 276.69   2. Debility  R53.81 799.3   3. Esophageal dysphagia  R13.19 787.29   4. Impaired mobility [Z74.09]  Z74.09 799.89   5. Iron deficiency anemia, unspecified iron deficiency anemia type  D50.9 280.9   6. Abnormal CT of the abdomen  R93.5 793.6     Patient Active Problem List   Diagnosis    T12 compression fracture, initial encounter    Chronic embolism and thrombosis of unspecified deep veins of left lower extremity    Chronic anticoagulation    Iron deficiency anemia    Osteoporosis    E coli bacteremia    Epidural hematoma    Pleural effusion, left    Functional neurological symptom disorder with weakness or paralysis    Overweight (BMI 25.0-29.9)    Rheumatoid arthritis involving multiple sites    (HFpEF) heart failure with preserved ejection fraction    Venous insufficiency    Coronary artery disease involving native coronary artery of native heart without angina pectoris    Sick sinus syndrome    Essential hypertension    Pressure injury of skin of heel    Chronic pain    Anemia of chronic disease    Cholelithiasis    Pressure injury of skin of buttock    Near functional paraplegia    Skin cancer    Squamous cell carcinoma of back    Hematoma    Right-sided chest wall pain    Hyperlipidemia LDL goal <70    Malodorous urine    Stage 3b chronic kidney disease    Cyst of buttocks    Sepsis due to Escherichia coli without acute organ dysfunction    Generalized weakness    Paralysis    History of urinary retention    Bacteremia due to Enterobacter species    Bacteremia    Hypothyroidism    Fluid overload     Past Medical History:   Diagnosis Date    Age-related osteoporosis with current pathological fracture 2020  "   Arthritis     Asthma     Bilateral bunions 12/23/2020    Cancer     Cardiac pacemaker syndrome 12/23/2020    Overview:  - heart block - implanted 11/16    Charcot's joint of foot, left 12/23/2020    Chronic deep vein thrombosis (DVT) of right lower extremity 06/23/2021    Chronic pain syndrome 06/22/2021    Chronic sinusitis     COPD (chronic obstructive pulmonary disease)     Coronary artery disease     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    Elevated cholesterol     Eustachian tube dysfunction     Heart disease     Herpes simplex     History of transfusion     Hyperlipidemia     Hypertension     Hypothyroidism 12/23/2020    Intrinsic asthma 12/23/2020    Knee dislocation     Labral tear of right hip joint     Laryngitis sicca     Laryngitis, chronic     Left carotid bruit 03/09/2016    MI (myocardial infarction)     Myalgia due to statin 06/25/2019    Open wound of right hip 09/14/2021    Osteomyelitis of right femur 07/06/2021    Otorrhea     Pacemaker 11/17/2016    Primary osteoarthritis of left knee 12/23/2020    Psoriasis vulgaris 12/23/2020    S/P coronary artery stent placement 03/09/2016    Sensorineural hearing loss     Seropositive rheumatoid arthritis of multiple sites 12/27/2019    Overview:  -myochrysine '93-'96 -methotrexate '96--->11/98;r/s  restarted 2/99--> 8/14 (anemia) -sulfasalazine- not effective -penicillamine 6/98-->10/98; no effect -leflunomide 11/98--> - Humira '13-->didn't take - Enbrel 12/14-->3/15- no effect!   Last Assessment & Plan:  - \"aching all over\" because she had to be off her anti-rheumatic drugs for 2 weeks in preparation for her R knee surgery - he    Sick sinus syndrome 12/27/2019    Sjogren's disease     Spondylolisthesis of lumbar region 01/17/2018    Syncope, recurrent 02/08/2021    Urinary tract infection     UTI (urinary tract infection) 04/19/2024     Past Surgical History:   Procedure Laterality Date    A-V CARDIAC PACEMAKER INSERTION  2016    ATRIAL CARDIAC " PACEMAKER INSERTION      CARDIAC CATHETERIZATION      CATARACT EXTRACTION      CERVICAL CORPECTOMY N/A 3/3/2021    Procedure: CERVICAL 6 CORPECTOMY WITH TITANIUM CAGE WITH NEURO MONITORING;  Surgeon: Bandar Shea MD;  Location:  [facility];  Service: Neurosurgery;  Laterality: N/A;    COLONOSCOPY  11/08/2011    One fold in the ascending colon which showed ulcer otherwise normal exam    COLONOSCOPY  11/12/2004    Normal exam repeat in five years    CORONARY ANGIOPLASTY WITH STENT PLACEMENT      X 2; 2013 & 2014    ENDOSCOPY  07/10/2014    Normal exam    FLAP LEG Right 9/14/2021    Procedure: RIGHT GLUTEAL FASCIOCUTANEOUS ADVANCEMENT FLAP AND RIGHT TENSOR FASCIAL JESSICA FLAP;  Surgeon: Amadeo Turner MD;  Location:  [facility];  Service: Plastics;  Laterality: Right;    HIP ABDUCTION TENOTOMY BILATERAL Right 1/14/2021    Procedure: RIGHT HIP GLUTEUS MEDLUS / MINIMUS REPAIR, POSSIBLE ACHILLES ALLOGRAFT;  Surgeon: Nino Carlson MD;  Location:  [facility];  Service: Orthopedics;  Laterality: Right;    INCISION AND DRAINAGE ABSCESS Right 6/4/2022    Procedure: INCISION AND DRAINAGE ABSCESS right hip;  Surgeon: Magda Salcido MD;  Location:  [facility];  Service: General;  Laterality: Right;    INCISION AND DRAINAGE ABSCESS Right 6/10/2022    Procedure: RIGHT HIP INCISION AND DRAINAGE. MD NEEDS 3L VANC IRRIGATION, CURRETTES, DAICANS, KERLEX ROLLS;  Surgeon: Amadeo Turner MD;  Location:  [facility];  Service: Plastics;  Laterality: Right;    INCISION AND DRAINAGE HIP Right 2/9/2021    Procedure: HIP INCISION AND DRAINAGE;  Surgeon: Nino Carlson MD;  Location:  [facility];  Service: Orthopedics;  Laterality: Right;    INCISION AND DRAINAGE LEG Right 10/24/2021    Procedure: INCISION AND DRAINAGE LOWER EXTREMITY;  Surgeon: Amadeo Turner MD;  Location:  [facility];  Service: Plastics;  Laterality: Right;    INCISION AND DRAINAGE OF WOUND Right 7/8/2021    Procedure: INCISION AND DRAINAGE WOUND RIGHT HIP;  Surgeon:  James Huntley MD;  Location:  [facility];  Service: Orthopedics;  Laterality: Right;    JOINT REPLACEMENT      KYPHOPLASTY WITH BIOPSY Bilateral 10/26/2021    Procedure: THOARCIC 12 KYPHOPLASTY WITH BIOPSY;  Surgeon: Bandar Shea MD;  Location:  [facility];  Service: Neurosurgery;  Laterality: Bilateral;    LEG DEBRIDEMENT Right 9/14/2021    Procedure: DEBRIDEMENT OF RIGHT HIP WOUND, RIGHT GLUTEAL FASCIOCUTANEOUS ADVANCEMENT FLAP AND RIGHT TENSOR FASCIAL JESSICA FLAP;  Surgeon: Amadeo Turner MD;  Location:  [facility];  Service: Plastics;  Laterality: Right;    LUMBAR DISCECTOMY Right 3/23/2021    Procedure: LUMBAR DISCECTOMY MICRO, Lumbar 1/2 right;  Surgeon: Bandar Shea MD;  Location:  [facility];  Service: Neurosurgery;  Laterality: Right;    LUMBAR FUSION N/A 1/19/2018    Procedure: L3-4,L4-5 DECOMPRESSION, POSTERIOR SPINAL FUSION WITH INSTRUMENTATION;  Surgeon: Fortino Oropeza MD;  Location:  [facility];  Service:     LUMBAR LAMINECTOMY WITH FUSION Left 1/17/2018    Procedure: LEFT L3-4 L4-5 LATERAL LUMBAR INTERBODY FUSION;  Surgeon: Fortino Oropeza MD;  Location:  [facility];  Service:     MASS EXCISION Right 4/23/2024    Procedure: RIGHT BUTTOCK MASS EXCISION;  Surgeon: Moises Keyes MD;  Location:  [facility];  Service: General;  Laterality: Right;    MYRINGOTOMY W/ TUBES  09/04/2014    TUBES NO LONGER IN PLACE    OTHER SURGICAL HISTORY      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 so hardware was removed and spacers were placed    REPLACEMENT TOTAL KNEE Right      PT Assessment (Last 12 Hours)       PT Evaluation and Treatment       Row Name 05/27/24 5429          Physical Therapy Time and Intention    Subjective Information complains of;weakness  -AE     Document Type therapy note (daily note)  -AE     Mode of Treatment physical therapy  -AE       Row Name 05/27/24 0809          General Information    Existing Precautions/Restrictions fall  -AE       Row Name 05/27/24 9775          Pain     Pretreatment Pain Rating 0/10 - no pain  -AE     Posttreatment Pain Rating 0/10 - no pain  -AE       Row Name 05/27/24 1553          Bed Mobility    Supine-Sit [place] (Bed Mobility) contact guard;verbal cues  -AE       Row Name 05/27/24 1553          Sit-Stand Transfer    Sit-Stand [place] (Transfers) minimum assist (75% patient effort);2 person assist  -AE     Assistive Device (Sit-Stand Transfers) walker, front-wheeled  -AE       Row Name 05/27/24 1553          Stand-Sit Transfer    Stand-Sit [place] (Transfers) contact guard;verbal cues  -AE       Row Name 05/27/24 1553          Balance    Additional Documentation --  Pt able to scoot up towards HOB  -AE       Row Name             Wound 04/23/24 0936 Right gluteal Incision    Wound - Properties Group Placement Date: 04/23/24  -EP Placement Time: 0936  -EP Present on Original Admission: N  -EP Side: Right  -EP Location: gluteal  -EP Primary Wound Type: Incision  -EP    Retired Wound - Properties Group Placement Date: 04/23/24  -EP Placement Time: 0936  -EP Present on Original Admission: N  -EP Side: Right  -EP Location: gluteal  -EP Primary Wound Type: Incision  -EP    Retired Wound - Properties Group Date first assessed: 04/23/24  -EP Time first assessed: 0936  -EP Present on Original Admission: N  -EP Side: Right  -EP Location: gluteal  -EP Primary Wound Type: Incision  -EP      Row Name 05/27/24 1553          Positioning and Restraints    Pre-Treatment Position in bed  -AE     Post Treatment Position bed  -AE     In Bed fowlers;call light within reach  -AE               User Key  (r) = Recorded By, (t) = Taken By, (c) = Cosigned By      Initials Name Provider Type    AE Rosemary Guardado PTA Physical Therapist Assistant    Sonny Thompson RN Registered Nurse                    Physical Therapy Education       Title: PT OT SLP Therapies (In Progress)       Topic: Physical Therapy (In Progress)       Point: Mobility training (Done)       Learning  Progress Summary             Patient Acceptance, E, VU by MS at 5/26/2024 0949    Comment: role of PT in her care                         Point: Home exercise program (Not Started)       Learner Progress:  Not documented in this visit.              Point: Body mechanics (Not Started)       Learner Progress:  Not documented in this visit.              Point: Precautions (Not Started)       Learner Progress:  Not documented in this visit.                              User Key       Initials Effective Dates Name Provider Type Discipline    MS 07/11/23 -  Nicole Olson, PT, DPT, NCS Physical Therapist PT                  PT Recommendation and Plan     Plan of Care Reviewed With: patient  Progress: improving  Outcome Evaluation: Pt had no c/o pain and was min x1 supine to sit.Pt was min x2 to stand with RW x 3 reps with sitter assisting.Pt attempted but was unable to take side steps with RW but she was able to scoot up towards HOB in seated position.Pt would benefit from continued PT.   Outcome Measures       Row Name 05/26/24 0801             How much help from another is currently needed...    Putting on and taking off regular lower body clothing? 2  -AC      Bathing (including washing, rinsing, and drying) 2  -AC      Toileting (which includes using toilet bed pan or urinal) 2  -AC      Putting on and taking off regular upper body clothing 3  -AC      Taking care of personal grooming (such as brushing teeth) 4  -AC      Eating meals 4  -AC      AM-PAC 6 Clicks Score (OT) 17  -AC         Functional Assessment    Outcome Measure Options AM-PAC 6 Clicks Daily Activity (OT)  -AC                User Key  (r) = Recorded By, (t) = Taken By, (c) = Cosigned By      Initials Name Provider Type    AC Sebastián Howe, OTR/L, CNT Occupational Therapist                     Time Calculation:    PT Charges       Row Name 05/27/24 1623             Time Calculation    Start Time 1553  -AE      Stop Time 1616  -AE      Time  Calculation (min) 23 min  -AE      PT Received On 05/27/24  -AE      PT Goal Re-Cert Due Date 06/05/24  -AE         Time Calculation- PT    Total Timed Code Minutes- PT 23 minute(s)  -AE         Timed Charges    76623 - PT Therapeutic Activity Minutes 23  -AE         Total Minutes    Timed Charges Total Minutes 23  -AE       Total Minutes 23  -AE                User Key  (r) = Recorded By, (t) = Taken By, (c) = Cosigned By      Initials Name Provider Type    AE Rosemary Guardado PTA Physical Therapist Assistant                  Therapy Charges for Today       Code Description Service Date Service Provider Modifiers Qty    52929001161 HC PT THERAPEUTIC ACT EA 15 MIN 5/27/2024 Rosemary Guardado PTA GP 2            PT G-Codes  Outcome Measure Options: AM-PAC 6 Clicks Daily Activity (OT)  AM-PAC 6 Clicks Score (PT): 12  AM-PAC 6 Clicks Score (OT): 17    Rosemary Guardado PTA  5/27/2024

## 2024-05-27 NOTE — PLAN OF CARE
Goal Outcome Evaluation:  Plan of Care Reviewed With: patient        Progress: no change  Outcome Evaluation: Patient rested well. Complains of pain x1 this far. Voiding via purewick. IV abx infusing per order otherwise IID. Turn q2h. VSS. Safety maintained.

## 2024-05-28 ENCOUNTER — HOME CARE VISIT (OUTPATIENT)
Dept: HOME HEALTH SERVICES | Facility: CLINIC | Age: 69
End: 2024-05-28
Payer: MEDICARE

## 2024-05-28 ENCOUNTER — HOME CARE VISIT (OUTPATIENT)
Dept: HOME HEALTH SERVICES | Facility: HOME HEALTHCARE | Age: 69
End: 2024-05-28
Payer: MEDICARE

## 2024-05-28 PROBLEM — R13.10 DYSPHAGIA: Status: ACTIVE | Noted: 2024-05-24

## 2024-05-28 LAB
ANION GAP SERPL CALCULATED.3IONS-SCNC: 7 MMOL/L (ref 5–15)
BUN SERPL-MCNC: 15 MG/DL (ref 8–23)
BUN/CREAT SERPL: 16.3 (ref 7–25)
CALCIUM SPEC-SCNC: 8.1 MG/DL (ref 8.6–10.5)
CHLORIDE SERPL-SCNC: 106 MMOL/L (ref 98–107)
CO2 SERPL-SCNC: 27 MMOL/L (ref 22–29)
CREAT SERPL-MCNC: 0.92 MG/DL (ref 0.57–1)
DEPRECATED RDW RBC AUTO: 60.4 FL (ref 37–54)
EGFRCR SERPLBLD CKD-EPI 2021: 67.1 ML/MIN/1.73
ERYTHROCYTE [DISTWIDTH] IN BLOOD BY AUTOMATED COUNT: 17.3 % (ref 12.3–15.4)
GLUCOSE SERPL-MCNC: 77 MG/DL (ref 65–99)
HCT VFR BLD AUTO: 26.9 % (ref 34–46.6)
HGB BLD-MCNC: 8 G/DL (ref 12–15.9)
MCH RBC QN AUTO: 28.7 PG (ref 26.6–33)
MCHC RBC AUTO-ENTMCNC: 29.7 G/DL (ref 31.5–35.7)
MCV RBC AUTO: 96.4 FL (ref 79–97)
PLATELET # BLD AUTO: 177 10*3/MM3 (ref 140–450)
PMV BLD AUTO: 12.1 FL (ref 6–12)
POTASSIUM SERPL-SCNC: 4 MMOL/L (ref 3.5–5.2)
RBC # BLD AUTO: 2.79 10*6/MM3 (ref 3.77–5.28)
SODIUM SERPL-SCNC: 140 MMOL/L (ref 136–145)
WBC NRBC COR # BLD AUTO: 5.68 10*3/MM3 (ref 3.4–10.8)

## 2024-05-28 PROCEDURE — 97535 SELF CARE MNGMENT TRAINING: CPT | Performed by: OCCUPATIONAL THERAPIST

## 2024-05-28 PROCEDURE — 97110 THERAPEUTIC EXERCISES: CPT

## 2024-05-28 PROCEDURE — 80048 BASIC METABOLIC PNL TOTAL CA: CPT | Performed by: FAMILY MEDICINE

## 2024-05-28 PROCEDURE — 99232 SBSQ HOSP IP/OBS MODERATE 35: CPT | Performed by: NURSE PRACTITIONER

## 2024-05-28 PROCEDURE — 63710000001 PREDNISONE PER 5 MG: Performed by: FAMILY MEDICINE

## 2024-05-28 PROCEDURE — 99232 SBSQ HOSP IP/OBS MODERATE 35: CPT | Performed by: INTERNAL MEDICINE

## 2024-05-28 PROCEDURE — 25010000002 CEFTRIAXONE PER 250 MG: Performed by: INTERNAL MEDICINE

## 2024-05-28 PROCEDURE — 85027 COMPLETE CBC AUTOMATED: CPT | Performed by: FAMILY MEDICINE

## 2024-05-28 PROCEDURE — 97530 THERAPEUTIC ACTIVITIES: CPT

## 2024-05-28 RX ORDER — FAMOTIDINE 20 MG/1
20 TABLET, FILM COATED ORAL
Status: DISCONTINUED | OUTPATIENT
Start: 2024-05-28 | End: 2024-05-31 | Stop reason: HOSPADM

## 2024-05-28 RX ADMIN — LEFLUNOMIDE 20 MG: 20 TABLET ORAL at 09:25

## 2024-05-28 RX ADMIN — ASPIRIN 81 MG: 81 TABLET, COATED ORAL at 09:25

## 2024-05-28 RX ADMIN — FAMOTIDINE 20 MG: 20 TABLET, FILM COATED ORAL at 18:42

## 2024-05-28 RX ADMIN — DIPHENHYDRAMINE HYDROCHLORIDE AND LIDOCAINE HYDROCHLORIDE AND ALUMINUM HYDROXIDE AND MAGNESIUM HYDRO 5 ML: KIT at 18:42

## 2024-05-28 RX ADMIN — ISOSORBIDE MONONITRATE 60 MG: 60 TABLET, EXTENDED RELEASE ORAL at 09:25

## 2024-05-28 RX ADMIN — DIPHENHYDRAMINE HYDROCHLORIDE AND LIDOCAINE HYDROCHLORIDE AND ALUMINUM HYDROXIDE AND MAGNESIUM HYDRO 5 ML: KIT at 09:25

## 2024-05-28 RX ADMIN — FAMOTIDINE 20 MG: 10 INJECTION INTRAVENOUS at 09:25

## 2024-05-28 RX ADMIN — PREDNISONE 5 MG: 5 TABLET ORAL at 09:25

## 2024-05-28 RX ADMIN — TRAMADOL HYDROCHLORIDE 50 MG: 50 TABLET ORAL at 20:13

## 2024-05-28 RX ADMIN — Medication 10 ML: at 09:26

## 2024-05-28 RX ADMIN — DIPHENHYDRAMINE HYDROCHLORIDE AND LIDOCAINE HYDROCHLORIDE AND ALUMINUM HYDROXIDE AND MAGNESIUM HYDRO 5 ML: KIT at 05:52

## 2024-05-28 RX ADMIN — Medication 1 TABLET: at 09:25

## 2024-05-28 RX ADMIN — Medication 10 ML: at 20:13

## 2024-05-28 RX ADMIN — FERROUS SULFATE TAB 325 MG (65 MG ELEMENTAL FE) 325 MG: 325 (65 FE) TAB at 09:25

## 2024-05-28 RX ADMIN — FOLIC ACID 1000 MCG: 1 TABLET ORAL at 09:25

## 2024-05-28 RX ADMIN — CEFTRIAXONE SODIUM 2000 MG: 2 INJECTION, POWDER, FOR SOLUTION INTRAMUSCULAR; INTRAVENOUS at 09:25

## 2024-05-28 RX ADMIN — OXYCODONE HYDROCHLORIDE AND ACETAMINOPHEN 250 MG: 500 TABLET ORAL at 09:25

## 2024-05-28 RX ADMIN — LEVOTHYROXINE SODIUM 75 MCG: 0.07 TABLET ORAL at 05:52

## 2024-05-28 RX ADMIN — DULOXETINE HYDROCHLORIDE 30 MG: 30 CAPSULE, DELAYED RELEASE ORAL at 09:25

## 2024-05-28 RX ADMIN — DIPHENHYDRAMINE HYDROCHLORIDE AND LIDOCAINE HYDROCHLORIDE AND ALUMINUM HYDROXIDE AND MAGNESIUM HYDRO 5 ML: KIT at 20:14

## 2024-05-28 NOTE — THERAPY TREATMENT NOTE
Patient Name: Tawny Shin  : 1955    MRN: [id number]                              Today's Date: 2024       Admit Date: 2024    Visit Dx:     ICD-10-CM ICD-9-CM   1. Hypervolemia, unspecified hypervolemia type  E87.70 276.69   2. Debility  R53.81 799.3   3. Esophageal dysphagia  R13.19 787.29   4. Impaired mobility [Z74.09]  Z74.09 799.89   5. Iron deficiency anemia, unspecified iron deficiency anemia type  D50.9 280.9   6. Abnormal CT of the abdomen  R93.5 793.6   7. Dysphagia, unspecified type  R13.10 787.20     Patient Active Problem List   Diagnosis    T12 compression fracture, initial encounter    Chronic embolism and thrombosis of unspecified deep veins of left lower extremity    Chronic anticoagulation    Iron deficiency anemia    Osteoporosis    E coli bacteremia    Epidural hematoma    Pleural effusion, left    Functional neurological symptom disorder with weakness or paralysis    Overweight (BMI 25.0-29.9)    Rheumatoid arthritis involving multiple sites    (HFpEF) heart failure with preserved ejection fraction    Venous insufficiency    Coronary artery disease involving native coronary artery of native heart without angina pectoris    Sick sinus syndrome    Essential hypertension    Pressure injury of skin of heel    Chronic pain    Anemia of chronic disease    Cholelithiasis    Pressure injury of skin of buttock    Near functional paraplegia    Skin cancer    Squamous cell carcinoma of back    Hematoma    Right-sided chest wall pain    Hyperlipidemia LDL goal <70    Malodorous urine    Stage 3b chronic kidney disease    Cyst of buttocks    Sepsis due to Escherichia coli without acute organ dysfunction    Generalized weakness    Paralysis    History of urinary retention    Bacteremia due to Enterobacter species    Bacteremia    Hypothyroidism    Fluid overload    Dysphagia     Past Medical History:   Diagnosis Date    Age-related osteoporosis with current pathological fracture  "05/27/2020    Arthritis     Asthma     Bilateral bunions 12/23/2020    Cancer     Cardiac pacemaker syndrome 12/23/2020    Overview:  - heart block - implanted 11/16    Charcot's joint of foot, left 12/23/2020    Chronic deep vein thrombosis (DVT) of right lower extremity 06/23/2021    Chronic pain syndrome 06/22/2021    Chronic sinusitis     COPD (chronic obstructive pulmonary disease)     Coronary artery disease     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    Elevated cholesterol     Eustachian tube dysfunction     Heart disease     Herpes simplex     History of transfusion     Hyperlipidemia     Hypertension     Hypothyroidism 12/23/2020    Intrinsic asthma 12/23/2020    Knee dislocation     Labral tear of right hip joint     Laryngitis sicca     Laryngitis, chronic     Left carotid bruit 03/09/2016    MI (myocardial infarction)     Myalgia due to statin 06/25/2019    Open wound of right hip 09/14/2021    Osteomyelitis of right femur 07/06/2021    Otorrhea     Pacemaker 11/17/2016    Primary osteoarthritis of left knee 12/23/2020    Psoriasis vulgaris 12/23/2020    S/P coronary artery stent placement 03/09/2016    Sensorineural hearing loss     Seropositive rheumatoid arthritis of multiple sites 12/27/2019    Overview:  -myochrysine '93-'96 -methotrexate '96--->11/98;r/s  restarted 2/99--> 8/14 (anemia) -sulfasalazine- not effective -penicillamine 6/98-->10/98; no effect -leflunomide 11/98--> - Humira '13-->didn't take - Enbrel 12/14-->3/15- no effect!   Last Assessment & Plan:  - \"aching all over\" because she had to be off her anti-rheumatic drugs for 2 weeks in preparation for her R knee surgery - he    Sick sinus syndrome 12/27/2019    Sjogren's disease     Spondylolisthesis of lumbar region 01/17/2018    Syncope, recurrent 02/08/2021    Urinary tract infection     UTI (urinary tract infection) 04/19/2024     Past Surgical History:   Procedure Laterality Date    A-V CARDIAC PACEMAKER INSERTION  2016    " ATRIAL CARDIAC PACEMAKER INSERTION      CARDIAC CATHETERIZATION      CATARACT EXTRACTION      CERVICAL CORPECTOMY N/A 3/3/2021    Procedure: CERVICAL 6 CORPECTOMY WITH TITANIUM CAGE WITH NEURO MONITORING;  Surgeon: Bandar Shea MD;  Location:  [facility];  Service: Neurosurgery;  Laterality: N/A;    COLONOSCOPY  11/08/2011    One fold in the ascending colon which showed ulcer otherwise normal exam    COLONOSCOPY  11/12/2004    Normal exam repeat in five years    CORONARY ANGIOPLASTY WITH STENT PLACEMENT      X 2; 2013 & 2014    ENDOSCOPY  07/10/2014    Normal exam    FLAP LEG Right 9/14/2021    Procedure: RIGHT GLUTEAL FASCIOCUTANEOUS ADVANCEMENT FLAP AND RIGHT TENSOR FASCIAL JESSICA FLAP;  Surgeon: Amadeo Turner MD;  Location:  [facility];  Service: Plastics;  Laterality: Right;    HIP ABDUCTION TENOTOMY BILATERAL Right 1/14/2021    Procedure: RIGHT HIP GLUTEUS MEDLUS / MINIMUS REPAIR, POSSIBLE ACHILLES ALLOGRAFT;  Surgeon: Nino Carlson MD;  Location:  [facility];  Service: Orthopedics;  Laterality: Right;    INCISION AND DRAINAGE ABSCESS Right 6/4/2022    Procedure: INCISION AND DRAINAGE ABSCESS right hip;  Surgeon: Magda Salcido MD;  Location:  [facility];  Service: General;  Laterality: Right;    INCISION AND DRAINAGE ABSCESS Right 6/10/2022    Procedure: RIGHT HIP INCISION AND DRAINAGE. MD NEEDS 3L VANC IRRIGATION, CURRETTES, DAICANS, KERLEX ROLLS;  Surgeon: Amadeo Turner MD;  Location:  [facility];  Service: Plastics;  Laterality: Right;    INCISION AND DRAINAGE HIP Right 2/9/2021    Procedure: HIP INCISION AND DRAINAGE;  Surgeon: Nino Carlson MD;  Location:  [facility];  Service: Orthopedics;  Laterality: Right;    INCISION AND DRAINAGE LEG Right 10/24/2021    Procedure: INCISION AND DRAINAGE LOWER EXTREMITY;  Surgeon: Amadeo Turner MD;  Location:  [facility];  Service: Plastics;  Laterality: Right;    INCISION AND DRAINAGE OF WOUND Right 7/8/2021    Procedure: INCISION AND DRAINAGE WOUND RIGHT  HIP;  Surgeon: James Huntley MD;  Location:  [facility];  Service: Orthopedics;  Laterality: Right;    JOINT REPLACEMENT      KYPHOPLASTY WITH BIOPSY Bilateral 10/26/2021    Procedure: THOARCIC 12 KYPHOPLASTY WITH BIOPSY;  Surgeon: Bandar Shea MD;  Location:  [facility];  Service: Neurosurgery;  Laterality: Bilateral;    LEG DEBRIDEMENT Right 9/14/2021    Procedure: DEBRIDEMENT OF RIGHT HIP WOUND, RIGHT GLUTEAL FASCIOCUTANEOUS ADVANCEMENT FLAP AND RIGHT TENSOR FASCIAL JESSICA FLAP;  Surgeon: Amadeo Turner MD;  Location:  [facility];  Service: Plastics;  Laterality: Right;    LUMBAR DISCECTOMY Right 3/23/2021    Procedure: LUMBAR DISCECTOMY MICRO, Lumbar 1/2 right;  Surgeon: Bandar Shea MD;  Location:  [facility];  Service: Neurosurgery;  Laterality: Right;    LUMBAR FUSION N/A 1/19/2018    Procedure: L3-4,L4-5 DECOMPRESSION, POSTERIOR SPINAL FUSION WITH INSTRUMENTATION;  Surgeon: Fortino Oropeza MD;  Location:  [facility];  Service:     LUMBAR LAMINECTOMY WITH FUSION Left 1/17/2018    Procedure: LEFT L3-4 L4-5 LATERAL LUMBAR INTERBODY FUSION;  Surgeon: Fortino Oropeza MD;  Location:  [facility];  Service:     MASS EXCISION Right 4/23/2024    Procedure: RIGHT BUTTOCK MASS EXCISION;  Surgeon: Moises Keyes MD;  Location:  [facility];  Service: General;  Laterality: Right;    MYRINGOTOMY W/ TUBES  09/04/2014    TUBES NO LONGER IN PLACE    OTHER SURGICAL HISTORY      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 so hardware was removed and spacers were placed    REPLACEMENT TOTAL KNEE Right       General Information       Row Name 05/28/24 1324          OT Time and Intention    Document Type therapy note (daily note)  -MM     Mode of Treatment occupational therapy  -MM       Row Name 05/28/24 1324          General Information    Patient Profile Reviewed yes  -MM     Existing Precautions/Restrictions fall  -MM     Barriers to Rehab medically complex;previous functional deficit  -MM       Row Name 05/28/24  1324          Safety Issues, Functional Mobility    Impairments Affecting Function (Mobility) balance;pain;strength;endurance/activity tolerance  -MM               User Key  (r) = Recorded By, (t) = Taken By, (c) = Cosigned By      Initials Name Provider Type    Jair Burnette, OTR/L Occupational Therapist                     Mobility/ADL's       Row Name 05/28/24 1324          Bed Mobility    Bed Mobility rolling left;scooting/bridging;sidelying-sit  -MM     Rolling Left [place] (Bed Mobility) supervision;verbal cues  -MM     Scooting/Bridging [place] (Bed Mobility) moderate assist (50% patient effort);verbal cues  at EOB  -MM     Sidelying-Sit [place] (Bed Mobility) minimum assist (75% patient effort);verbal cues  -MM     Assistive Device (Bed Mobility) bed rails;head of bed elevated;draw sheet  -MM     Comment, (Bed Mobility) additional time required for bed mobility  -MM       Row Name 05/28/24 1324          Activities of Daily Living    BADL Assessment/Intervention upper body dressing;lower body dressing;grooming  -MM       Row Name 05/28/24 1324          Upper Body Dressing Assessment/Training    [place] Level (Upper Body Dressing) doff;don;minimum assist (75% patient effort);verbal cues;set up  hospital gown  -MM     Position (Upper Body Dressing) edge of bed sitting  -MM       Row Name 05/28/24 1324          Lower Body Dressing Assessment/Training    [place] Level (Lower Body Dressing) don;doff;socks;dependent (less than 25% patient effort)  -MM     Position (Lower Body Dressing) edge of bed sitting  -MM       Row Name 05/28/24 1324          Grooming Assessment/Training    [place] Level (Grooming) hair care, combing/brushing;oral care regimen;wash face, hands;standby assist;set up;verbal cues  abilio deoderant  -MM     Oral Care teeth brushed - regular toothbrush  -MM     Position (Grooming) edge of bed sitting  -MM               User Key  (r) = Recorded By, (t) = Taken By,  (c) = Cosigned By      Initials Name Provider Type    Jair Burnette, OTR/L Occupational Therapist                   Obj/Interventions       Row Name 05/28/24 1324          Balance    Balance Assessment sitting static balance;sitting dynamic balance  -MM     Static Sitting Balance standby assist  -MM     Dynamic Sitting Balance standby assist  -MM     Position, Sitting Balance supported;unsupported;sitting edge of bed  -MM               User Key  (r) = Recorded By, (t) = Taken By, (c) = Cosigned By      Initials Name Provider Type    Jair Burnette, OTR/L Occupational Therapist                   Goals/Plan    No documentation.                  Clinical Impression       Row Name 05/28/24 1324          Pain Assessment    Pretreatment Pain Rating 0/10 - no pain  -MM     Posttreatment Pain Rating 0/10 - no pain  -MM       Row Name 05/28/24 1324          Plan of Care Review    Plan of Care Reviewed With patient;caregiver;family  -MM     Progress no change  -MM     Outcome Evaluation OT tx completed. Pt is agreeable to therapy. Pt reports no pain or complaints at rest. Pt reports dizziness with bed mobility. Pt was supervision to roll left, min A for sidelying to sit and mod A to scoot hips to EOB. Pt was dependent to doff/don socks. Pt was min A to doff/don hospital gown. Pt was SBA with set up and verbal cues, for all grooming tasks completed this date. Pt was SBA for EOB sitting balance. Pt stays sitting EOB at end of session with family present and PCA and charge RN notified. Continue OT POC.  -MM       Row Name 05/28/24 1324          Therapy Plan Review/Discharge Plan (OT)    Anticipated Discharge Disposition (OT) home with 24/7 care;home with home health  -MM       Row Name 05/28/24 1324          Positioning and Restraints    Pre-Treatment Position in bed  -MM     Post Treatment Position bed  -MM     In Bed sitting EOB;call light within reach;encouraged to call for assist;with family/caregiver;side rails  up x2  notified PCA and charge RN.  -MM               User Key  (r) = Recorded By, (t) = Taken By, (c) = Cosigned By      Initials Name Provider Type    Jair Burnette, OTR/L Occupational Therapist                   Outcome Measures       Row Name 05/28/24 1324          How much help from another is currently needed...    Putting on and taking off regular lower body clothing? 1  -MM     Bathing (including washing, rinsing, and drying) 2  -MM     Toileting (which includes using toilet bed pan or urinal) 2  -MM     Putting on and taking off regular upper body clothing 3  -MM     Taking care of personal grooming (such as brushing teeth) 3  -MM     Eating meals 4  -MM     AM-PAC 6 Clicks Score (OT) 15  -MM       Row Name 05/28/24 1100 05/28/24 0745       How much help from another person do you currently need...    Turning from your back to your side while in flat bed without using bedrails? 3  -KJ 3  -AO    Moving from lying on back to sitting on the side of a flat bed without bedrails? 3  -KJ 3  -AO    Moving to and from a bed to a chair (including a wheelchair)? 2  -KJ 1  -AO    Standing up from a chair using your arms (e.g., wheelchair, bedside chair)? 2  -KJ 3  -AO    Climbing 3-5 steps with a railing? 1  -KJ 1  -AO    To walk in hospital room? 2  -KJ 1  -AO    AM-PAC 6 Clicks Score (PT) 13  -KJ 12  -AO    Highest Level of Mobility Goal 4 --> Transfer to chair/commode  -KJ 4 --> Transfer to chair/commode  -AO      Row Name 05/28/24 1324 05/28/24 1100       Functional Assessment    Outcome Measure Options AM-PAC 6 Clicks Daily Activity (OT)  -MM AM-PAC 6 Clicks Basic Mobility (PT)  -KJ              User Key  (r) = Recorded By, (t) = Taken By, (c) = Cosigned By      Initials Name Provider Type    Emilee Gloria, PTA Physical Therapist Assistant    Kellee Conklin RN Registered Nurse    Jair Burnette, OTR/L Occupational Therapist                    Occupational Therapy Education       Title: PT OT  SLP Therapies (In Progress)       Topic: Occupational Therapy (Done)       Point: ADL training (Done)       Description:   Instruct learner(s) on proper safety adaptation and remediation techniques during self care or transfers.   Instruct in proper use of assistive devices.                  Learning Progress Summary             Patient Acceptance, E, VU by MM at 5/28/2024 1633    Acceptance, E, VU by AC at 5/26/2024 0855                         Point: Home exercise program (Done)       Description:   Instruct learner(s) on appropriate technique for monitoring, assisting and/or progressing therapeutic exercises/activities.                  Learning Progress Summary             Patient Acceptance, E, VU by AC at 5/26/2024 0855                         Point: Precautions (Done)       Description:   Instruct learner(s) on prescribed precautions during self-care and functional transfers.                  Learning Progress Summary             Patient Acceptance, E, VU by MM at 5/28/2024 1633    Acceptance, E, VU by AC at 5/26/2024 0855                         Point: Body mechanics (Done)       Description:   Instruct learner(s) on proper positioning and spine alignment during self-care, functional mobility activities and/or exercises.                  Learning Progress Summary             Patient Acceptance, E, VU by MM at 5/28/2024 1633    Acceptance, E, VU by AC at 5/26/2024 0855                                         User Key       Initials Effective Dates Name Provider Type Discipline     02/03/23 -  Sebastián Howe, OTR/L, CNT Occupational Therapist OT     07/11/23 -  Jair Farrar, OTR/L Occupational Therapist OT                  OT Recommendation and Plan     Plan of Care Review  Plan of Care Reviewed With: patient, caregiver, family  Progress: no change  Outcome Evaluation: OT tx completed. Pt is agreeable to therapy. Pt reports no pain or complaints at rest. Pt reports dizziness with bed mobility. Pt  was supervision to roll left, min A for sidelying to sit and mod A to scoot hips to EOB. Pt was dependent to doff/don socks. Pt was min A to doff/don hospital gown. Pt was SBA with set up and verbal cues, for all grooming tasks completed this date. Pt was SBA for EOB sitting balance. Pt stays sitting EOB at end of session with family present and PCA and charge RN notified. Continue OT POC.     Time Calculation:         Time Calculation- OT       Row Name 05/28/24 1324             Time Calculation- OT    OT Start Time 1324  -MM      OT Stop Time 1410  -MM      OT Time Calculation (min) 46 min  -MM      Total Timed Code Minutes- OT 46 minute(s)  -MM      OT Received On 05/28/24  -MM         Timed Charges    71656 - OT Self Care/Mgmt Minutes 46  -MM         Total Minutes    Timed Charges Total Minutes 46  -MM       Total Minutes 46  -MM                User Key  (r) = Recorded By, (t) = Taken By, (c) = Cosigned By      Initials Name Provider Type    MM Jair Farrar, OTR/L Occupational Therapist                  Therapy Charges for Today       Code Description Service Date Service Provider Modifiers Qty    [id number] HC OT SELF CARE/MGMT/TRAIN EA 15 MIN 5/28/2024 Jair Farrar OTR/L GO 3                 KATHYA Peralta/KARUNA  5/28/2024

## 2024-05-28 NOTE — THERAPY TREATMENT NOTE
Acute Care - Physical Therapy Treatment Note  [facility]     Patient Name: Tawny Shin  : 1955  MRN: [id number]  Today's Date: 2024   Onset of Illness/Injury or Date of Surgery: 24  Visit Dx:     ICD-10-CM ICD-9-CM   1. Hypervolemia, unspecified hypervolemia type  E87.70 276.69   2. Debility  R53.81 799.3   3. Esophageal dysphagia  R13.19 787.29   4. Impaired mobility [Z74.09]  Z74.09 799.89   5. Iron deficiency anemia, unspecified iron deficiency anemia type  D50.9 280.9   6. Abnormal CT of the abdomen  R93.5 793.6   7. Dysphagia, unspecified type  R13.10 787.20     Patient Active Problem List   Diagnosis    T12 compression fracture, initial encounter    Chronic embolism and thrombosis of unspecified deep veins of left lower extremity    Chronic anticoagulation    Iron deficiency anemia    Osteoporosis    E coli bacteremia    Epidural hematoma    Pleural effusion, left    Functional neurological symptom disorder with weakness or paralysis    Overweight (BMI 25.0-29.9)    Rheumatoid arthritis involving multiple sites    (HFpEF) heart failure with preserved ejection fraction    Venous insufficiency    Coronary artery disease involving native coronary artery of native heart without angina pectoris    Sick sinus syndrome    Essential hypertension    Pressure injury of skin of heel    Chronic pain    Anemia of chronic disease    Cholelithiasis    Pressure injury of skin of buttock    Near functional paraplegia    Skin cancer    Squamous cell carcinoma of back    Hematoma    Right-sided chest wall pain    Hyperlipidemia LDL goal <70    Malodorous urine    Stage 3b chronic kidney disease    Cyst of buttocks    Sepsis due to Escherichia coli without acute organ dysfunction    Generalized weakness    Paralysis    History of urinary retention    Bacteremia due to Enterobacter species    Bacteremia    Hypothyroidism    Fluid overload     Past Medical History:   Diagnosis Date    Age-related  "osteoporosis with current pathological fracture 05/27/2020    Arthritis     Asthma     Bilateral bunions 12/23/2020    Cancer     Cardiac pacemaker syndrome 12/23/2020    Overview:  - heart block - implanted 11/16    Charcot's joint of foot, left 12/23/2020    Chronic deep vein thrombosis (DVT) of right lower extremity 06/23/2021    Chronic pain syndrome 06/22/2021    Chronic sinusitis     COPD (chronic obstructive pulmonary disease)     Coronary artery disease     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    Elevated cholesterol     Eustachian tube dysfunction     Heart disease     Herpes simplex     History of transfusion     Hyperlipidemia     Hypertension     Hypothyroidism 12/23/2020    Intrinsic asthma 12/23/2020    Knee dislocation     Labral tear of right hip joint     Laryngitis sicca     Laryngitis, chronic     Left carotid bruit 03/09/2016    MI (myocardial infarction)     Myalgia due to statin 06/25/2019    Open wound of right hip 09/14/2021    Osteomyelitis of right femur 07/06/2021    Otorrhea     Pacemaker 11/17/2016    Primary osteoarthritis of left knee 12/23/2020    Psoriasis vulgaris 12/23/2020    S/P coronary artery stent placement 03/09/2016    Sensorineural hearing loss     Seropositive rheumatoid arthritis of multiple sites 12/27/2019    Overview:  -myochrysine '93-'96 -methotrexate '96--->11/98;r/s  restarted 2/99--> 8/14 (anemia) -sulfasalazine- not effective -penicillamine 6/98-->10/98; no effect -leflunomide 11/98--> - Humira '13-->didn't take - Enbrel 12/14-->3/15- no effect!   Last Assessment & Plan:  - \"aching all over\" because she had to be off her anti-rheumatic drugs for 2 weeks in preparation for her R knee surgery - he    Sick sinus syndrome 12/27/2019    Sjogren's disease     Spondylolisthesis of lumbar region 01/17/2018    Syncope, recurrent 02/08/2021    Urinary tract infection     UTI (urinary tract infection) 04/19/2024     Past Surgical History:   Procedure Laterality Date "    A-V CARDIAC PACEMAKER INSERTION  2016    ATRIAL CARDIAC PACEMAKER INSERTION      CARDIAC CATHETERIZATION      CATARACT EXTRACTION      CERVICAL CORPECTOMY N/A 3/3/2021    Procedure: CERVICAL 6 CORPECTOMY WITH TITANIUM CAGE WITH NEURO MONITORING;  Surgeon: Bandar Shea MD;  Location:  [facility];  Service: Neurosurgery;  Laterality: N/A;    COLONOSCOPY  11/08/2011    One fold in the ascending colon which showed ulcer otherwise normal exam    COLONOSCOPY  11/12/2004    Normal exam repeat in five years    CORONARY ANGIOPLASTY WITH STENT PLACEMENT      X 2; 2013 & 2014    ENDOSCOPY  07/10/2014    Normal exam    FLAP LEG Right 9/14/2021    Procedure: RIGHT GLUTEAL FASCIOCUTANEOUS ADVANCEMENT FLAP AND RIGHT TENSOR FASCIAL JESSICA FLAP;  Surgeon: Amadeo Turner MD;  Location:  [facility];  Service: Plastics;  Laterality: Right;    HIP ABDUCTION TENOTOMY BILATERAL Right 1/14/2021    Procedure: RIGHT HIP GLUTEUS MEDLUS / MINIMUS REPAIR, POSSIBLE ACHILLES ALLOGRAFT;  Surgeon: Nino Carlson MD;  Location:  [facility];  Service: Orthopedics;  Laterality: Right;    INCISION AND DRAINAGE ABSCESS Right 6/4/2022    Procedure: INCISION AND DRAINAGE ABSCESS right hip;  Surgeon: Magda Salcido MD;  Location:  [facility];  Service: General;  Laterality: Right;    INCISION AND DRAINAGE ABSCESS Right 6/10/2022    Procedure: RIGHT HIP INCISION AND DRAINAGE. MD NEEDS 3L VANC IRRIGATION, CURRETTES, DAICANS, KERLEX ROLLS;  Surgeon: Amadeo Tunrer MD;  Location:  [facility];  Service: Plastics;  Laterality: Right;    INCISION AND DRAINAGE HIP Right 2/9/2021    Procedure: HIP INCISION AND DRAINAGE;  Surgeon: Nino Carlson MD;  Location:  [facility];  Service: Orthopedics;  Laterality: Right;    INCISION AND DRAINAGE LEG Right 10/24/2021    Procedure: INCISION AND DRAINAGE LOWER EXTREMITY;  Surgeon: Amadeo Turner MD;  Location:  [facility];  Service: Plastics;  Laterality: Right;    INCISION AND DRAINAGE OF WOUND Right 7/8/2021     Procedure: INCISION AND DRAINAGE WOUND RIGHT HIP;  Surgeon: James Huntley MD;  Location:  [facility];  Service: Orthopedics;  Laterality: Right;    JOINT REPLACEMENT      KYPHOPLASTY WITH BIOPSY Bilateral 10/26/2021    Procedure: THOARCIC 12 KYPHOPLASTY WITH BIOPSY;  Surgeon: Bandar Shea MD;  Location:  [facility];  Service: Neurosurgery;  Laterality: Bilateral;    LEG DEBRIDEMENT Right 9/14/2021    Procedure: DEBRIDEMENT OF RIGHT HIP WOUND, RIGHT GLUTEAL FASCIOCUTANEOUS ADVANCEMENT FLAP AND RIGHT TENSOR FASCIAL JESSICA FLAP;  Surgeon: Amadeo Turner MD;  Location:  [facility];  Service: Plastics;  Laterality: Right;    LUMBAR DISCECTOMY Right 3/23/2021    Procedure: LUMBAR DISCECTOMY MICRO, Lumbar 1/2 right;  Surgeon: Bandar Shea MD;  Location:  [facility];  Service: Neurosurgery;  Laterality: Right;    LUMBAR FUSION N/A 1/19/2018    Procedure: L3-4,L4-5 DECOMPRESSION, POSTERIOR SPINAL FUSION WITH INSTRUMENTATION;  Surgeon: Fortino Oropeza MD;  Location:  [facility];  Service:     LUMBAR LAMINECTOMY WITH FUSION Left 1/17/2018    Procedure: LEFT L3-4 L4-5 LATERAL LUMBAR INTERBODY FUSION;  Surgeon: Fortino Oropeza MD;  Location:  [facility];  Service:     MASS EXCISION Right 4/23/2024    Procedure: RIGHT BUTTOCK MASS EXCISION;  Surgeon: Moises Keyes MD;  Location:  [facility];  Service: General;  Laterality: Right;    MYRINGOTOMY W/ TUBES  09/04/2014    TUBES NO LONGER IN PLACE    OTHER SURGICAL HISTORY      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 so hardware was removed and spacers were placed    REPLACEMENT TOTAL KNEE Right      PT Assessment (Last 12 Hours)       PT Evaluation and Treatment       Row Name 05/28/24 1030          Physical Therapy Time and Intention    Subjective Information complains of;weakness  -KJ     Document Type therapy note (daily note)  -KJ     Mode of Treatment physical therapy  -KJ     Patient Effort good  -KJ       Row Name 05/28/24 1030          General Information     Existing Precautions/Restrictions fall  -KJ       Row Name 05/28/24 1030          Pain    Pretreatment Pain Rating 0/10 - no pain  -KJ     Posttreatment Pain Rating 0/10 - no pain  -KJ       Row Name 05/28/24 1030          Bed Mobility    Rolling Left Gordon (Bed Mobility) verbal cues;minimum assist (75% patient effort)  -KJ     Rolling Right Gordon (Bed Mobility) minimum assist (75% patient effort);verbal cues  -KJ     Supine-Sit Gordon (Bed Mobility) minimum assist (75% patient effort)  -KJ     Sit-Supine Gordon (Bed Mobility) minimum assist (75% patient effort)  -KJ       Row Name 05/28/24 1030          Sit-Stand Transfer    Sit-Stand Gordon (Transfers) verbal cues;minimum assist (75% patient effort)  -KJ     Assistive Device (Sit-Stand Transfers) walker, front-wheeled  -KJ     Comment, (Sit-Stand Transfer) elevated bed to assist in standing  -KJ       Row Name 05/28/24 1030          Stand-Sit Transfer    Stand-Sit Gordon (Transfers) contact guard;verbal cues  -KJ       Row Name 05/28/24 1030          Gait/Stairs (Locomotion)    Comment, (Gait/Stairs) stood x 2 parital; able to take side steps towards head of bed  -KJ       Row Name 05/28/24 1030          Balance    Balance Assessment sitting static balance  -KJ     Static Sitting Balance independent  -KJ     Dynamic Sitting Balance verbal cues;contact guard;minimal assist  -KJ     Position, Sitting Balance sitting edge of bed  -KJ       Row Name 05/28/24 1030          Motor Skills    Therapeutic Exercise aerobic  -KJ       Row Name 05/28/24 1030          Aerobic Exercise    Comment, Aerobic Exercise (Therapeutic Exercise) A/MARCINOM BLE's  -KJ       Row Name             Wound 04/23/24 0936 Right gluteal Incision    Wound - Properties Group Placement Date: 04/23/24  -EP Placement Time: 0936  -EP Present on Original Admission: N  -EP Side: Right  -EP Location: gluteal  -EP Primary Wound Type: Incision  -EP    Retired Wound -  Properties Group Placement Date: 04/23/24  -EP Placement Time: 0936  -EP Present on Original Admission: N  -EP Side: Right  -EP Location: gluteal  -EP Primary Wound Type: Incision  -EP    Retired Wound - Properties Group Date first assessed: 04/23/24  -EP Time first assessed: 0936  -EP Present on Original Admission: N  -EP Side: Right  -EP Location: gluteal  -EP Primary Wound Type: Incision  -EP      Row Name 05/28/24 1030          Positioning and Restraints    Pre-Treatment Position in bed  -KJ     Post Treatment Position bed  -KJ     In Bed call light within reach;side lying left  -KJ               User Key  (r) = Recorded By, (t) = Taken By, (c) = Cosigned By      Initials Name Provider Type    Emilee Gloria, PTA Physical Therapist Assistant    Sonny Thompson RN Registered Nurse                    Physical Therapy Education       Title: PT OT SLP Therapies (In Progress)       Topic: Physical Therapy (In Progress)       Point: Mobility training (Done)       Learning Progress Summary             Patient Acceptance, E, VU by MS at 5/26/2024 0943    Comment: role of PT in her care                         Point: Home exercise program (Not Started)       Learner Progress:  Not documented in this visit.              Point: Body mechanics (Not Started)       Learner Progress:  Not documented in this visit.              Point: Precautions (Not Started)       Learner Progress:  Not documented in this visit.                              User Key       Initials Effective Dates Name Provider Type Discipline    MS 07/11/23 -  Nicole Olson, PT, DPT, NCS Physical Therapist PT                  PT Recommendation and Plan     Plan of Care Reviewed With: patient  Progress: improving  Outcome Evaluation: PT tx completed. Pt has reports she feels weak, she is typically able to get out of bed and take a few steps.  Required Beata bed mobility, Beata rolling, Beata sit<>stand, took a few side steps towards head of bed utilizing   rwx. At this time recommend cont PT for strengthening.   Outcome Measures       Row Name 05/28/24 1100 05/26/24 0801          How much help from another person do you currently need...    Turning from your back to your side while in flat bed without using bedrails? 3  -KJ --     Moving from lying on back to sitting on the side of a flat bed without bedrails? 3  -KJ --     Moving to and from a bed to a chair (including a wheelchair)? 2  -KJ --     Standing up from a chair using your arms (e.g., wheelchair, bedside chair)? 2  -KJ --     Climbing 3-5 steps with a railing? 1  -KJ --     To walk in hospital room? 2  -KJ --     AM-PAC 6 Clicks Score (PT) 13  -KJ --     Highest Level of Mobility Goal 4 --> Transfer to chair/commode  -KJ --        How much help from another is currently needed...    Putting on and taking off regular lower body clothing? -- 2  -AC     Bathing (including washing, rinsing, and drying) -- 2  -AC     Toileting (which includes using toilet bed pan or urinal) -- 2  -AC     Putting on and taking off regular upper body clothing -- 3  -AC     Taking care of personal grooming (such as brushing teeth) -- 4  -AC     Eating meals -- 4  -AC     AM-PAC 6 Clicks Score (OT) -- 17  -AC        Functional Assessment    Outcome Measure Options AM-PAC 6 Clicks Basic Mobility (PT)  -KJ AM-PAC 6 Clicks Daily Activity (OT)  -AC               User Key  (r) = Recorded By, (t) = Taken By, (c) = Cosigned By      Initials Name Provider Type    Sebastián Catherine, OTR/L, CNT Occupational Therapist    Emilee Gloria, PTA Physical Therapist Assistant                     Time Calculation:    PT Charges       Row Name 05/28/24 1121             Time Calculation    Start Time 1030  -KJ      Stop Time 1111  -KJ      Time Calculation (min) 41 min  -KJ      PT Received On 05/28/24  -KJ      PT Goal Re-Cert Due Date 06/05/24  -KJ         Time Calculation- PT    Total Timed Code Minutes- PT 41 minute(s)  -KJ                User  Key  (r) = Recorded By, (t) = Taken By, (c) = Cosigned By      Initials Name Provider Type    Emilee Gloria PTA Physical Therapist Assistant                  Therapy Charges for Today       Code Description Service Date Service Provider Modifiers Qty    72480461832 HC PT THER PROC EA 15 MIN 5/28/2024 Emilee Paul PTA GP 1    46906847476 HC PT THERAPEUTIC ACT EA 15 MIN 5/28/2024 Emilee Paul PTA GP 2            PT G-Codes  Outcome Measure Options: AM-PAC 6 Clicks Basic Mobility (PT)  AM-PAC 6 Clicks Score (PT): 13  AM-PAC 6 Clicks Score (OT): 17    Emilee Paul PTA  5/28/2024

## 2024-05-28 NOTE — CASE MANAGEMENT/SOCIAL WORK
Discharge Planning Assessment   [place]     Patient Name: Tawny Shin  MRN: [id number]  Today's Date: 5/28/2024    Admit Date: 5/24/2024        Discharge Needs Assessment       Row Name 05/28/24 1020       Living Environment    People in Home alone    Current Living Arrangements home    Potentially Unsafe Housing Conditions none    Primary Care Provided by other (see comments)    Provides Primary Care For no one    Family Caregiver if Needed child(mary jo), adult;other (see comments);sibling(s)    Quality of Family Relationships helpful;supportive;involved    Able to Return to Prior Arrangements yes       Resource/Environmental Concerns    Resource/Environmental Concerns none       Transition Planning    Patient/Family Anticipates Transition to home with help/services    Patient/Family Anticipated Services at Transition durable medical equipment    Transportation Anticipated family or friend will provide       Discharge Needs Assessment    Readmission Within the Last 30 Days no previous admission in last 30 days    Current Outpatient/Agency/Support Group homecare agency    Equipment Currently Used at Home hospital bed;shower chair;grab bar;wheelchair;walker, rolling;power chair,(recliner lift)    Concerns to be Addressed discharge planning;care coordination/care conferences    Anticipated Changes Related to Illness none    Equipment Needed After Discharge lift device    Outpatient/Agency/Support Group Needs homecare agency    Discharge Facility/Level of Care Needs home with home health    Discharge Coordination/Progress Spoke with pt, sister, and caregiver about d/c plans. Plan is for pt to go home at d/c. Caregiver mentioned that pt will need a johan lift at d/c. They are unsure at this time what dme agency pt uses. Will inform MD of request. Pt is current with [facility]. Verified with Althea Caballero. She will need resumption of care orders at d/c.                   Discharge Plan    No documentation.                  Continued Care and Services - Admitted Since 5/24/2024       Home Medical Care       Service Provider Request Status Selected Services Address Phone Fax Patient Preferred     [facility] Pending - No Request Sent N/A [address] [facility] [id number] [phone] --                     Demographic Summary    No documentation.                  Functional Status    No documentation.                  Psychosocial    No documentation.                  Abuse/Neglect    No documentation.                  Legal    No documentation.                  Substance Abuse    No documentation.                  Patient Forms    No documentation.                     BRIAN Anderson

## 2024-05-28 NOTE — PLAN OF CARE
Goal Outcome Evaluation: No complaints of pain this shift. Physical therapy has worked with patient this shift. Patient has been sitting up on side of bed Sitter at bedside. Patient safety to be maintained this shift, continue to monitor and report abnormal to provider.

## 2024-05-28 NOTE — PLAN OF CARE
Goal Outcome Evaluation:  Plan of Care Reviewed With: patient        Progress: improving  Outcome Evaluation: PT tx completed. Pt has reports she feels weak, she is typically able to get out of bed and take a few steps.  Required Beata bed mobility, Beata rolling, Beata sit<>stand, took a few side steps towards head of bed utilizing  rwx. At this time recommend cont PT for strengthening.

## 2024-05-28 NOTE — PROGRESS NOTES
[facility]  Inpatient Progress Note      Date of Admission: 5/24/2024  Date of Service:  05/28/24    Reason for Follow Up: Dysphagia    Subjective     Subjective:   Patient lying in bed with caregiver at bedside.  Patient continues to have difficulty swallowing solids and liquids.  Symptoms felt in lower esophagus.  She is currently denying abdominal pain.  She does experience heartburn but not any worse than normal.  Denies signs of GI blood loss.  Bowel movement yesterday.    Current Facility-Administered Medications:     acetaminophen (TYLENOL) tablet 650 mg, 650 mg, Oral, Q6H PRN, Saulo Ambriz MD, 650 mg at 05/25/24 1210    albuterol (PROVENTIL) nebulizer solution 0.042% 1.25 mg/3mL, 1.25 mg, Nebulization, Q6H PRN, Yuli Hatch DO    [Held by provider] apixaban (ELIQUIS) tablet 5 mg, 5 mg, Oral, BID, Saulo Ambriz MD, 5 mg at 05/26/24 0827    ascorbic acid (VITAMIN C) tablet 250 mg, 250 mg, Oral, Daily, Saulo Ambriz MD, 250 mg at 05/27/24 0856    aspirin EC tablet 81 mg, 81 mg, Oral, Daily, Saulo Ambriz MD, 81 mg at 05/27/24 0856    Biotene dry mouth liquid 15 mL, 15 mL, Swish & Spit, 5x Daily PRN, Saulo Ambriz MD    sennosides-docusate (PERICOLACE) 8.6-50 MG per tablet 2 tablet, 2 tablet, Oral, BID PRN **AND** polyethylene glycol (MIRALAX) packet 17 g, 17 g, Oral, Daily PRN **AND** bisacodyl (DULCOLAX) EC tablet 5 mg, 5 mg, Oral, Daily PRN **AND** bisacodyl (DULCOLAX) suppository 10 mg, 10 mg, Rectal, Daily PRN, Saulo Ambriz MD    carvedilol (COREG) tablet 25 mg, 25 mg, Oral, BID With Meals, Saulo Ambriz MD, 25 mg at 05/27/24 1646    cefTRIAXone (ROCEPHIN) 2,000 mg in sodium chloride 0.9 % 100 mL MBP, 2,000 mg, Intravenous, Q24H, Maggie Bang MD    Diclofenac Sodium (VOLTAREN) 1 % gel 4 g, 4 g, Topical, 4x Daily PRN, Saulo Ambriz MD    DULoxetine (CYMBALTA) DR capsule 30 mg, 30 mg, Oral, Daily, Saulo Ambriz MD, 30 mg at 05/27/24 0880     famotidine (PEPCID) injection 20 mg, 20 mg, Intravenous, Daily, Saulo Ambriz MD, 20 mg at 05/27/24 0855    ferrous sulfate tablet 325 mg, 325 mg, Oral, Daily With Breakfast, Saulo Ambriz MD, 325 mg at 05/27/24 0856    First Mouthwash (Magic Mouthwash) 5 mL, 5 mL, Swish & Spit, Q6H, Saulo Ambriz MD, 5 mL at 05/28/24 0552    folic acid (FOLVITE) tablet 1,000 mcg, 1,000 mcg, Oral, Daily, Saulo Ambriz MD, 1,000 mcg at 05/27/24 0856    isosorbide mononitrate (IMDUR) 24 hr tablet 60 mg, 60 mg, Oral, Daily, Saulo Ambriz MD, 60 mg at 05/27/24 0856    leflunomide (ARAVA) tablet 20 mg, 20 mg, Oral, Daily, Saulo Ambriz MD, 20 mg at 05/27/24 0856    levothyroxine (SYNTHROID, LEVOTHROID) tablet 75 mcg, 75 mcg, Oral, Q AM, Saulo Ambriz MD, 75 mcg at 05/28/24 0552    Lidocaine 4 % 2 patch, 2 patch, Transdermal, Q24H, Saulo Ambriz MD, 2 patch at 05/26/24 0825    losartan (COZAAR) tablet 50 mg, 50 mg, Oral, Daily, Saulo Ambriz MD, 50 mg at 05/27/24 0856    magnesium hydroxide (MILK OF MAGNESIA) 400 MG/5ML suspension 30 mL, 30 mL, Oral, Daily PRN, Saulo Ambriz MD    multivitamin with minerals 1 tablet, 1 tablet, Oral, Daily, Saulo Ambriz MD, 1 tablet at 05/27/24 0856    nitroglycerin (NITROSTAT) SL tablet 0.4 mg, 0.4 mg, Sublingual, Q5 Min PRN, Saulo Ambriz MD    ondansetron (ZOFRAN) injection 4 mg, 4 mg, Intravenous, Q6H PRN, Saulo Ambriz MD    predniSONE (DELTASONE) tablet 5 mg, 5 mg, Oral, Daily, Saulo Ambriz MD, 5 mg at 05/27/24 0856    sodium chloride 0.9 % flush 10 mL, 10 mL, Intravenous, PRN, Saulo Ambriz MD    sodium chloride 0.9 % flush 10 mL, 10 mL, Intravenous, Q12H, Saulo Ambriz MD, 10 mL at 05/27/24 2049    sodium chloride 0.9 % flush 10 mL, 10 mL, Intravenous, PRN, Saulo Ambriz MD    sodium chloride 0.9 % infusion 40 mL, 40 mL, Intravenous, PRN, Saulo Ambriz MD    traMADol (ULTRAM) tablet 50 mg, 50 mg, Oral, Q12H PRN, Saulo Ambriz MD, 50 mg at 05/27/24 2049     valACYclovir (VALTREX) tablet 500 mg, 500 mg, Oral, Daily PRN, Saulo Ambriz MD    Review of Systems     Constitution:  negative for chills,fevers, positive fatigue and wweakness  Eyes:  negative for blurriness and change of vision  ENT:   negative for sore throat and voice change  Respiratory: negative for  cough and shortness of air  Cardiovascular:  Negative for chest pain or palpitations  Gastrointestinal:  negative for  See HPI  Genitourinary:  negative for  blood in urine and painful urination  Integument: negative for  rash and redness  Hematologic / Lymphatic: negative for  excessive bleeding and easy bruising  Musculoskeletal: negative for  joint pain and joint stiffness out of the ordinary  Neurological:  negative for  seizures and speech abnormality  Behavioral/Psych:  negative for  anxiety and depression out of the ordinary  Endocrine: negative for  diabetes and weight loss, unintended  Allergies / Immunologic:  negative for  anaphylaxis and rash        Objective     Vital Signs  Temp:  [97.3 °F (36.3 °C)-98 °F (36.7 °C)] 98 °F (36.7 °C)  Heart Rate:  [68-75] 74  Resp:  [16-18] 16  BP: (123-177)/(57-88) 177/57  Body mass index is 34.2 kg/m².    Intake/Output Summary (Last 24 hours) at 5/28/2024 0922  Last data filed at 5/27/2024 2208  Gross per 24 hour   Intake --   Output 600 ml   Net -600 ml     No intake/output data recorded.       Physical Exam:   General: patient awake, alert and cooperative   Eyes: Normal lids and lashes, no scleral icterus   Neck: supple, normal ROM   Skin: warm and dry, not jaundiced   Cardiovascular: regular rhythm and rate, no murmurs auscultated   Pulm: clear to auscultation bilaterally, regular and unlabored   Abdomen: soft, nontender, nondistended; normal bowel sounds   Rectal: deferred   Extremities: no rash or edema   Psychiatric: Normal mood and behavior; memory intact         Results Review:    I have reviewed all of the patients current test results  Results from last  7 days   Lab Units 05/28/24  0411 05/27/24  0455 05/26/24  0419   WBC 10*3/mm3 5.68 5.70 6.77   HEMOGLOBIN g/dL 8.0* 7.5* 7.7*   HEMATOCRIT % 26.9* 25.1* 25.1*   PLATELETS 10*3/mm3 177 131* 127*       Results from last 7 days   Lab Units 05/28/24 0411 05/27/24 0455 05/26/24 0419 05/25/24  0537 05/24/24  1051   SODIUM mmol/L 140 141 140 141 136   POTASSIUM mmol/L 4.0 3.5 3.0* 3.5 3.6   CHLORIDE mmol/L 106 106 102 103 103   CO2 mmol/L 27.0 25.0 26.0 27.0 23.0   BUN mg/dL 15 21 29* 22 15   CREATININE mg/dL 0.92 1.14* 1.52* 1.28* 1.08*   CALCIUM mg/dL 8.1* 7.9* 7.9* 8.5* 8.6   BILIRUBIN mg/dL  --   --   --  0.5 0.7   ALK PHOS U/L  --   --   --  62 66   ALT (SGPT) U/L  --   --   --  26 30   AST (SGOT) U/L  --   --   --  26 39*   GLUCOSE mg/dL 77 84 90 106* 105*             Lab Results   Lab Value Date/Time    LIPASE 16 04/19/2024 0856       Radiology:    Imaging Results (Last 24 Hours)       ** No results found for the last 24 hours. **              Assessment & Plan       Fluid overload    T12 compression fracture, initial encounter    Chronic embolism and thrombosis of unspecified deep veins of left lower extremity    Chronic anticoagulation    Iron deficiency anemia    Osteoporosis    Overweight (BMI 25.0-29.9)    Rheumatoid arthritis involving multiple sites    (HFpEF) heart failure with preserved ejection fraction    Venous insufficiency    Coronary artery disease involving native coronary artery of native heart without angina pectoris    Sick sinus syndrome    Essential hypertension    Cholelithiasis    Paralysis    History of urinary retention    Hypothyroidism      Impression/Plan    Dysphagia  Abnormal CT-thickening to mid distal esophagus  GERD  UTI/bacteremia    Platelets have improved.  She continues to complain of difficulty tolerating liquids and solid food requiring regurgitation for relief.  Case discussed with infectious disease this morning, okay to proceed with EGD.  Eliquis has been on hold.   Patient's main complaint today is of weakness.  Will anticipate procedure tomorrow with Dr. Sanchez (order entered).      Electronically signed by DANIELA Shanks, 05/28/24, 9:22 AM CDT.       DANIELA Shanks  05/28/24  09:22 CDT

## 2024-05-28 NOTE — PLAN OF CARE
Goal Outcome Evaluation:  Plan of Care Reviewed With: patient        Progress: improving  Outcome Evaluation: Patient rested well. Complains of pain x1 thus far. Turn q2h. Voiding via purewick. IV IID. Room air. VSS. Safety maintained.

## 2024-05-28 NOTE — PROGRESS NOTES
[facility]  INPATIENT PROGRESS NOTE    Patient Name: Tawny Shin  Date of Admission: 5/24/2024  Today's Date: 05/28/24  Length of Stay: 4  Primary Care Physician: Moises Oliveira MD    Subjective   Chief Complaint: Weakness   HPI   Sitting in bed she is repositioning with nurse assistance.  No new complaints.    Review of Systems   All pertinent negatives and positives are as above. All other systems have been reviewed and are negative unless otherwise stated.     Objective    Temp:  [97.3 °F (36.3 °C)-98 °F (36.7 °C)] 97.8 °F (36.6 °C)  Heart Rate:  [68-77] 77  Resp:  [16-18] 16  BP: (142-177)/(57-88) 142/58  Physical Exam  Vitals and nursing note reviewed.   Constitutional:       Comments: Chronically ill.   HENT:      Head: Normocephalic.   Eyes:      Conjunctiva/sclera: Conjunctivae normal.      Pupils: Pupils are equal, round, and reactive to light.   Cardiovascular:      Rate and Rhythm: Normal rate and regular rhythm.      Heart sounds: Normal heart sounds.   Pulmonary:      Effort: Pulmonary effort is normal. No respiratory distress.      Breath sounds: Normal breath sounds.   Abdominal:      General: Bowel sounds are normal. There is no distension.      Palpations: Abdomen is soft.      Tenderness: There is no abdominal tenderness.      Comments: Obesity .   Musculoskeletal:         General: No swelling.      Cervical back: Neck supple.      Comments: Deformities of the hand from arthritis.   Skin:     General: Skin is warm and dry.      Capillary Refill: Capillary refill takes 2 to 3 seconds.      Findings: No rash.   Neurological:      Mental Status: She is alert and oriented to person, place, and time.      Motor: Weakness present.      Coordination: Coordination abnormal.      Gait: Gait abnormal.   Psychiatric:         Mood and Affect: Mood normal.         Behavior: Behavior normal.         Thought Content: Thought content normal.        Results Review:  I have reviewed the labs, radiology results, and diagnostic studies.    Laboratory Data:   Results from last 7 days   Lab Units 05/28/24 0411 05/27/24 0455 05/26/24 0419   WBC 10*3/mm3 5.68 5.70 6.77   HEMOGLOBIN g/dL 8.0* 7.5* 7.7*   HEMATOCRIT % 26.9* 25.1* 25.1*   PLATELETS 10*3/mm3 177 131* 127*        Results from last 7 days   Lab Units 05/28/24 0411 05/27/24 0455 05/26/24 0419 05/25/24  0537 05/24/24  1051   SODIUM mmol/L 140 141 140 141 136   POTASSIUM mmol/L 4.0 3.5 3.0* 3.5 3.6   CHLORIDE mmol/L 106 106 102 103 103   CO2 mmol/L 27.0 25.0 26.0 27.0 23.0   BUN mg/dL 15 21 29* 22 15   CREATININE mg/dL 0.92 1.14* 1.52* 1.28* 1.08*   CALCIUM mg/dL 8.1* 7.9* 7.9* 8.5* 8.6   BILIRUBIN mg/dL  --   --   --  0.5 0.7   ALK PHOS U/L  --   --   --  62 66   ALT (SGPT) U/L  --   --   --  26 30   AST (SGOT) U/L  --   --   --  26 39*   GLUCOSE mg/dL 77 84 90 106* 105*       Culture Data:   Blood Culture   Date Value Ref Range Status   05/24/2024 No growth at 2 days  Preliminary   05/24/2024 Proteus mirabilis (C)  Final     Urine Culture   Date Value Ref Range Status   05/24/2024 >100,000 CFU/mL Proteus mirabilis (A)  Final       Radiology Data:   Imaging Results (Last 24 Hours)       ** No results found for the last 24 hours. **            I have reviewed the patient's current medications.     Assessment/Plan   Assessment  Active Hospital Problems    Diagnosis     **Fluid overload     Dysphagia     Hypothyroidism     Paralysis     History of urinary retention     Cholelithiasis     Essential hypertension     Coronary artery disease involving native coronary artery of native heart without angina pectoris     Sick sinus syndrome     Venous insufficiency     Overweight (BMI 25.0-29.9)     Rheumatoid arthritis involving multiple sites     (HFpEF) heart failure with preserved ejection fraction     Chronic anticoagulation     Iron deficiency anemia     Osteoporosis     Chronic embolism and thrombosis  "of unspecified deep veins of left lower extremity     T12 compression fracture, initial encounter        Treatment Plan  Shortness of breath.  Vascular congestion.  Patient received 80 of Lasix in ER. Improved.  Chest x-ray-Low lung volumes. Stable cardiomegaly with similar positioning of the  left subclavian cardiac pacer leads- Vascular crowding versus mild venous congestion,  No consolidation.  CT of the chest-Small bilateral pleural fluid and small layering groundglass opacity  at the fissures- could represent small pulmonary edema/fluid or atelectasis, Gallstone at the proximal gallbladder neck or cystic duct- Partially visualized gallbladder appears distended- not optimally evaluated on this exam, Diffuse esophageal thickening of the mid and distal esophagus- is new compared to 4/22/2022, recommend referral to gastroenterology for consideration of direct visualization.  Patient is on room air.     Gallstone proximal gallbladder neck and cystic duct/diffuse esophageal thickening of mid and distal esophagus. GI consult input noted  Ultrasound the gallbladder-Gallstone with no sign of cholecystitis.   Dysphagia/CT noted thickened mid/distal esophagus  GI recommendation-no additional workup for cholelithiasis until patients become symptomatic, dysphagia for last couple months-plan for upper endoscopy tomorrow, outpatient colonoscopy because this past due.      UTI.     Infect disease consult input noted.   Cefepime narrowed to Rocephin  \"reduce recurrent infections by drinking approximately 2 L of water a day, intravaginal estrogen, consideration for methenamine hippurate\"  History of CRE.      Chronic stage IIIb renal failure.  Creatinine increased to 1.52, now 1.14, probably secondary to Lasix one-time dose in ER 80 mg.     Rheumatoid arthritis/Hx Sjogren .  Biotene.  Magic mouthwash. Arava.  Prednisone daily.     History of DVT . Eliquis on hold for EGD.     CAD/hypertension/hyperlipidemia/pacemaker due to " history of sick sinus syndrome.  Aspirin. .  BNP 5000 in ER.  Patient received 80 of Lasix in the ER.  Imdur .  Continue Cozaar.  Nitro as needed.  Coreg.  Daily weight.  Strict in and out.    Allergic to statin     Depression/anxiety.  Cymbalta.     Hypokalemia.  P.o. potassium.  Magnesium level 1.8     Anemia.  Hemoglobin stable..  No sign of acute bleed.  Folic acid.  Iron sulfate.     Thrombocytopenia.  Platelet counts increasing.     Hypothyroidism . Synthroid.  TSH-normal.     Reflux.  Tums.  Zofran as needed.  Pepcid IV to PO     Constipation.  Magnesium oxide as needed.  Constipation protocol  as needed.  .     Pain.  Voltaren gel as needed.  Lidocaine patch as needed.  Ultram as needed.     Nutrition . Folic acid.  Cardiac diet.  Boost supplement.  Vitamin C.     Deconditioning.  PT and OT consult.  Patient get around with a walker at baseline.     Dr. Shin's mom.     Blood cultures Proteus 1 out of 2 bottles.  Urine culture-Proteus from UTI.      Medical Decision Making  Number and Complexity of problems: Shortness of breath/CHF exacerbation/gallstones/thickening esophagus/fail to thrive/rheumatoid arthritis/CAD/  Differential Diagnosis: None     Conditions and Status        Condition is unchanged.     [facility] Data  External documents reviewed: Previous note .  Cardiac tracing (EKG, telemetry) interpretation: Sinus rhythm .  Radiology interpretation: CT scan/x-ray  Labs reviewed: Laboratory  Any tests that were considered but not ordered: Laboratory in AM     Decision rules/scores evaluated (example [id number], Wells, etc): None     Discussed with: Patient and caregiver.     Care Planning  Shared decision making: Patient and caregiver.  Code status and discussions: Full code.     Disposition  Social Determinants of Health that impact treatment or disposition: From home.  Estimated length of stay is 1 to 4 days.        Electronically signed by Kris Cade MD, 05/28/24, 14:45 CDT.

## 2024-05-28 NOTE — PLAN OF CARE
Problem: Adult Inpatient Plan of Care  Goal: Plan of Care Review  Recent Flowsheet Documentation  Taken 5/28/2024 1324 by Jair Farrar, OTR/L  Progress: no change  Plan of Care Reviewed With:   patient   caregiver   family  Outcome Evaluation: OT tx completed. Pt is agreeable to therapy. Pt reports no pain or complaints at rest. Pt reports dizziness with bed mobility. Pt was supervision to roll left, min A for sidelying to sit and mod A to scoot hips to EOB. Pt was dependent to doff/don socks. Pt was min A to doff/don hospital gown. Pt was SBA with set up and verbal cues, for all grooming tasks completed this date. Pt was SBA for EOB sitting balance. Pt stays sitting EOB at end of session with family present and PCA and charge RN notified. Continue OT POC.

## 2024-05-28 NOTE — PROGRESS NOTES
INFECTIOUS DISEASES PROGRESS NOTE    Patient:  Tawny Shin  YOB: 1955  MRN: [id number]   Admit date: 5/24/2024   Admitting Physician: Kris Cade,*  Primary Care Physician: Moises Oliveira MD      Chief Complaint: Still just does not feel well      Interval History: Patient says she still does not feel well.  Per her caregiver she is did with therapy yesterday.  She felt too weak to try to walk.    Patient is having some dysphagia upon further questioning after thickening was noted in the distal esophagus on CT scan.    Per caregiver, patient has a more clear mental status than when she was admitted but still has some periods of confusion.      Allergies:   Allergies   Allergen Reactions    Atorvastatin Other (See Comments)     LEG CRAMPS      Amoxicillin Rash    Escitalopram Rash    Nabumetone Rash    Niacin Er Rash    Penicillin G Rash    Penicillins Rash    Simvastatin Rash       Current Scheduled Medications:   [Held by provider] apixaban, 5 mg, Oral, BID  ascorbic acid, 250 mg, Oral, Daily  aspirin, 81 mg, Oral, Daily  carvedilol, 25 mg, Oral, BID With Meals  cefTRIAXone, 2,000 mg, Intravenous, Q24H  DULoxetine, 30 mg, Oral, Daily  famotidine, 20 mg, Intravenous, Daily  ferrous sulfate, 325 mg, Oral, Daily With Breakfast  First Mouthwash (Magic Mouthwash), 5 mL, Swish & Spit, Q6H  folic acid, 1,000 mcg, Oral, Daily  isosorbide mononitrate, 60 mg, Oral, Daily  leflunomide, 20 mg, Oral, Daily  levothyroxine, 75 mcg, Oral, Q AM  Lidocaine, 2 patch, Transdermal, Q24H  losartan, 50 mg, Oral, Daily  multivitamin with minerals, 1 tablet, Oral, Daily  predniSONE, 5 mg, Oral, Daily  sodium chloride, 10 mL, Intravenous, Q12H      Current PRN Medications:    acetaminophen    albuterol    Biotene dry mouth    senna-docusate sodium **AND** polyethylene glycol **AND** bisacodyl **AND** bisacodyl    Diclofenac Sodium    magnesium hydroxide    nitroglycerin    ondansetron    sodium  "chloride    sodium chloride    sodium chloride    traMADol    valACYclovir            Objective     Vital Signs:  Temp (24hrs), Av.8 °F (36.6 °C), Min:97.3 °F (36.3 °C), Max:98 °F (36.7 °C)      /57 (BP Location: Left arm, Patient Position: Lying)   Pulse 74   Temp 98 °F (36.7 °C) (Oral)   Resp 16   Ht 167.6 cm (66\")   Wt 96.1 kg (211 lb 13.8 oz)   LMP  (LMP Unknown)   SpO2 94%   BMI 34.20 kg/m²         Physical Exam:    General: The patient is lying in bed in no acute distress  Respiratory: Effort even and unlabored, she is not conversationally dyspneic  Abdomen: Soft, nontender, nondistended        Results Review:    I reviewed the patient's new clinical results.    Lab Results:    CBC:   Lab Results   Lab 24  10524   WBC 6.65 8.09 6.77 5.70 5.68   HEMOGLOBIN 8.2* 7.5* 7.7* 7.5* 8.0*   HEMATOCRIT 26.7* 24.6* 25.1* 25.1* 26.9*   PLATELETS 115* 114* 127* 131* 177        AutoDiff:   Lab Results   Lab 24  1051 24   NEUTROPHIL % 66.3 67.5   LYMPHOCYTE % 20.3 17.8*   MONOCYTES % 11.3 12.7*   EOSINOPHIL % 0.2* 0.1*   BASOPHIL % 0.5 0.4   NEUTROS ABS 4.42 5.46   LYMPHS ABS 1.35 1.44   MONOS ABS 0.75 1.03*   EOS ABS 0.01 0.01   BASOS ABS 0.03 0.03        Manual Diff:    Lab Results   Lab 24  10524  05   NEUTROS ABS 4.42 5.46           CMP:   Lab Results   Lab 24  1051 24   SODIUM 136 141 140 141 140   POTASSIUM 3.6 3.5 3.0* 3.5 4.0   CHLORIDE 103 103 102 106 106   CO2 23.0 27.0 26.0 25.0 27.0   BUN 15 22 29* 21 15   CREATININE 1.08* 1.28* 1.52* 1.14* 0.92   CALCIUM 8.6 8.5* 7.9* 7.9* 8.1*   BILIRUBIN 0.7 0.5  --   --   --    ALK PHOS 66 62  --   --   --    ALT (SGPT) 30 26  --   --   --    AST (SGOT) 39* 26  --   --   --    GLUCOSE 105* 106* 90 84 77       Estimated Creatinine Clearance: 66.5 mL/min (by C-G formula based on SCr of 0.92 " mg/dL).    Culture Results:    Microbiology Results (last 10 days)       Procedure Component Value - Date/Time    Urine Culture - Urine, Urine, Clean Catch [[id number]]  (Abnormal)  (Susceptibility) Collected: 05/24/24 1312    Lab Status: Final result Specimen: Urine, Clean Catch Updated: 05/26/24 1110     Urine Culture >100,000 CFU/mL Proteus mirabilis    Narrative:      Colonization of the urinary tract without infection is common. Treatment is discouraged unless the patient is symptomatic, pregnant, or undergoing an invasive urologic procedure.    Susceptibility        Proteus mirabilis      LICHA      Amoxicillin + Clavulanate Susceptible      Ampicillin Susceptible      Ampicillin + Sulbactam Susceptible      Cefazolin Susceptible      Cefepime Susceptible      Ceftazidime Susceptible      Ceftriaxone Susceptible      Gentamicin Susceptible      Levofloxacin Susceptible      Nitrofurantoin Resistant      Piperacillin + Tazobactam Susceptible      Trimethoprim + Sulfamethoxazole Susceptible                           Blood Culture - Blood, Arm, Right [[id number]]  (Normal) Collected: 05/24/24 1110    Lab Status: Preliminary result Specimen: Blood from Arm, Right Updated: 05/27/24 1145     Blood Culture No growth at 3 days    COVID-19 and FLU A/B PCR, 1 HR TAT - Swab, Nasopharynx [[id number]]  (Normal) Collected: 05/24/24 1106    Lab Status: Final result Specimen: Swab from Nasopharynx Updated: 05/24/24 1136     COVID19 Not Detected     Influenza A PCR Not Detected     Influenza B PCR Not Detected    Narrative:      Fact sheet for providers: https://www.fda.gov/media/684224/download    Fact sheet for patients: https://www.fda.gov/media/583811/download    Test performed by PCR.    Blood Culture - Blood, Hand, Digit Right [[id number]]  (Abnormal)  (Susceptibility) Collected: 05/24/24 1051    Lab Status: Final result Specimen: Blood from Hand, Digit Right Updated: 05/27/24 0519     Blood Culture Proteus mirabilis      Isolated from Aerobic Bottle     Gram Stain Aerobic Bottle Gram negative bacilli    Susceptibility        Proteus mirabilis      LICHA      Amoxicillin + Clavulanate Susceptible      Ampicillin Susceptible      Ampicillin + Sulbactam Susceptible      Cefepime Susceptible      Ceftazidime Susceptible      Ceftriaxone Susceptible      Gentamicin Susceptible      Levofloxacin Susceptible      Piperacillin + Tazobactam Susceptible      Trimethoprim + Sulfamethoxazole Susceptible                       Susceptibility Comments       Proteus mirabilis    Cefazolin sensitivity will not be reported for Enterobacteriaceae in non-urine isolates. If cefazolin is preferred, please call the microbiology lab to request an E-test.  With the exception of urinary-sourced infections, aminoglycosides should not be used as monotherapy.               Blood Culture ID, PCR - Blood, Hand, Digit Right [[id number]]  (Abnormal) Collected: 05/24/24 1051    Lab Status: Final result Specimen: Blood from Hand, Digit Right Updated: 05/25/24 0648     BCID, PCR Proteus spp. Identification by BCID2 PCR.     BOTTLE TYPE Aerobic Bottle    Narrative:      No resistance genes detected.                 Radiology:   Imaging Results (Last 72 Hours)       ** No results found for the last 72 hours. **                Active Hospital Problems    Diagnosis     **Fluid overload     Hypothyroidism     Paralysis     History of urinary retention     Cholelithiasis     Essential hypertension     Coronary artery disease involving native coronary artery of native heart without angina pectoris     Sick sinus syndrome     Venous insufficiency     Overweight (BMI 25.0-29.9)     Rheumatoid arthritis involving multiple sites     (HFpEF) heart failure with preserved ejection fraction     Chronic anticoagulation     Iron deficiency anemia     Osteoporosis     Chronic embolism and thrombosis of unspecified deep veins of left lower extremity     T12 compression fracture, initial  encounter        IMPRESSION:  Proteus bacteremia secondary to Proteus UTI   HX of recurrent UTI - different organisms   HX CRE bacteremia  Remote HX of MSSA bacteremia  Immunocompromised patient with RA on leflunamide/prednisone  PINKY -likely secondary to lasix 80 mg iv X 1 upon admission-resolved  Anemia  Thrombocytopenia-resolved today      RECOMMENDATION:   continue ceftriaxone-plan to complete 7 full days of IV antibiotic therapy and likely transition to oral  Will continue to review with patient and caregiver considerations to reduce recurrent infections by drinking approximately 2 L of water a day, intravaginal estrogen, consideration for methenamine hippurate.   Contact isolation given hx of MDRO  Okay from infectious diseases standpoint to proceed with EGD      Discussed with DANIELA Carranza MD  05/28/24  08:10 CDT

## 2024-05-29 ENCOUNTER — ANESTHESIA (OUTPATIENT)
Dept: UROLOGY | Facility: HOSPITAL | Age: 69
End: 2024-05-29
Payer: MEDICARE

## 2024-05-29 ENCOUNTER — ANESTHESIA EVENT (OUTPATIENT)
Dept: UROLOGY | Facility: HOSPITAL | Age: 69
End: 2024-05-29
Payer: MEDICARE

## 2024-05-29 LAB
ANION GAP SERPL CALCULATED.3IONS-SCNC: 9 MMOL/L (ref 5–15)
BACTERIA SPEC AEROBE CULT: NORMAL
BUN SERPL-MCNC: 12 MG/DL (ref 8–23)
BUN/CREAT SERPL: 14.5 (ref 7–25)
CALCIUM SPEC-SCNC: 8.1 MG/DL (ref 8.6–10.5)
CHLORIDE SERPL-SCNC: 107 MMOL/L (ref 98–107)
CO2 SERPL-SCNC: 26 MMOL/L (ref 22–29)
CREAT SERPL-MCNC: 0.83 MG/DL (ref 0.57–1)
DEPRECATED RDW RBC AUTO: 61.2 FL (ref 37–54)
EGFRCR SERPLBLD CKD-EPI 2021: 75.9 ML/MIN/1.73
EPO SERPL-ACNC: 25.9 MIU/ML (ref 2.6–18.5)
ERYTHROCYTE [DISTWIDTH] IN BLOOD BY AUTOMATED COUNT: 17.4 % (ref 12.3–15.4)
GLUCOSE SERPL-MCNC: 95 MG/DL (ref 65–99)
HCT VFR BLD AUTO: 27.7 % (ref 34–46.6)
HGB BLD-MCNC: 8.2 G/DL (ref 12–15.9)
MCH RBC QN AUTO: 28.7 PG (ref 26.6–33)
MCHC RBC AUTO-ENTMCNC: 29.6 G/DL (ref 31.5–35.7)
MCV RBC AUTO: 96.9 FL (ref 79–97)
PLATELET # BLD AUTO: 185 10*3/MM3 (ref 140–450)
PMV BLD AUTO: 11.1 FL (ref 6–12)
POTASSIUM SERPL-SCNC: 3.7 MMOL/L (ref 3.5–5.2)
RBC # BLD AUTO: 2.86 10*6/MM3 (ref 3.77–5.28)
SODIUM SERPL-SCNC: 142 MMOL/L (ref 136–145)
WBC NRBC COR # BLD AUTO: 6.89 10*3/MM3 (ref 3.4–10.8)

## 2024-05-29 PROCEDURE — 80048 BASIC METABOLIC PNL TOTAL CA: CPT | Performed by: FAMILY MEDICINE

## 2024-05-29 PROCEDURE — 25010000002 CEFTRIAXONE PER 250 MG: Performed by: INTERNAL MEDICINE

## 2024-05-29 PROCEDURE — 85027 COMPLETE CBC AUTOMATED: CPT | Performed by: FAMILY MEDICINE

## 2024-05-29 PROCEDURE — 99232 SBSQ HOSP IP/OBS MODERATE 35: CPT | Performed by: INTERNAL MEDICINE

## 2024-05-29 PROCEDURE — 97530 THERAPEUTIC ACTIVITIES: CPT

## 2024-05-29 PROCEDURE — 63710000001 PREDNISONE PER 5 MG: Performed by: FAMILY MEDICINE

## 2024-05-29 PROCEDURE — 99222 1ST HOSP IP/OBS MODERATE 55: CPT | Performed by: INTERNAL MEDICINE

## 2024-05-29 PROCEDURE — 97110 THERAPEUTIC EXERCISES: CPT

## 2024-05-29 RX ORDER — AMLODIPINE BESYLATE 5 MG/1
5 TABLET ORAL
Status: DISCONTINUED | OUTPATIENT
Start: 2024-05-29 | End: 2024-05-31 | Stop reason: HOSPADM

## 2024-05-29 RX ADMIN — FOLIC ACID 1000 MCG: 1 TABLET ORAL at 13:10

## 2024-05-29 RX ADMIN — AMLODIPINE BESYLATE 5 MG: 5 TABLET ORAL at 13:25

## 2024-05-29 RX ADMIN — TRAMADOL HYDROCHLORIDE 50 MG: 50 TABLET ORAL at 21:29

## 2024-05-29 RX ADMIN — DULOXETINE HYDROCHLORIDE 30 MG: 30 CAPSULE, DELAYED RELEASE ORAL at 13:10

## 2024-05-29 RX ADMIN — ISOSORBIDE MONONITRATE 60 MG: 60 TABLET, EXTENDED RELEASE ORAL at 13:10

## 2024-05-29 RX ADMIN — ACETAMINOPHEN 650 MG: 325 TABLET, FILM COATED ORAL at 16:51

## 2024-05-29 RX ADMIN — LEFLUNOMIDE 20 MG: 20 TABLET ORAL at 13:10

## 2024-05-29 RX ADMIN — Medication 1 TABLET: at 13:10

## 2024-05-29 RX ADMIN — CEFTRIAXONE SODIUM 2000 MG: 2 INJECTION, POWDER, FOR SOLUTION INTRAMUSCULAR; INTRAVENOUS at 08:43

## 2024-05-29 RX ADMIN — PREDNISONE 5 MG: 5 TABLET ORAL at 13:10

## 2024-05-29 RX ADMIN — FERROUS SULFATE TAB 325 MG (65 MG ELEMENTAL FE) 325 MG: 325 (65 FE) TAB at 13:10

## 2024-05-29 RX ADMIN — OXYCODONE HYDROCHLORIDE AND ACETAMINOPHEN 250 MG: 500 TABLET ORAL at 13:10

## 2024-05-29 RX ADMIN — LOSARTAN POTASSIUM 50 MG: 50 TABLET, FILM COATED ORAL at 13:10

## 2024-05-29 RX ADMIN — FAMOTIDINE 20 MG: 20 TABLET, FILM COATED ORAL at 16:51

## 2024-05-29 RX ADMIN — Medication 10 ML: at 08:38

## 2024-05-29 RX ADMIN — DIPHENHYDRAMINE HYDROCHLORIDE AND LIDOCAINE HYDROCHLORIDE AND ALUMINUM HYDROXIDE AND MAGNESIUM HYDRO 5 ML: KIT at 21:29

## 2024-05-29 RX ADMIN — CARVEDILOL 25 MG: 25 TABLET, FILM COATED ORAL at 13:10

## 2024-05-29 NOTE — CONSULTS
"Inpatient Cardiology Consult  Consult performed by: Christos Mccullough MD  Consult ordered by: Kris Cade MD  Reason for consult: \"clearance for EGD\"        Chief Complaint   Patient presents with    Weakness - Generalized     HPI: This is a 68-year-old female with coronary artery disease (prior PCI to both the LAD and right coronary arteries), hypertension, hyperlipidemia, rheumatoid arthritis, sinus node dysfunction with a pacemaker in place, prior lower extremity DVT who presented on 5/24/2024 with complaints of shortness of breath and weakness.  The patient tells me that she has been quite ill for a prolonged period of time with multiple hospitalizations.  However, in the days leading up to the patient's hospitalization, she noted worsening shortness of breath and some edema in the lower extremities.  The patient tells me that she presented due to profound weakness, however.  Of note, she was recently admitted for a urinary tract infection and was ultimately found to have gram-negative bacteremia and has been followed by infectious disease.  During this hospital admission, the patient was noted to have a gallstone and possible distention of the gallbladder and esophageal thickening, therefore gastroenterology evaluated the patient on the day of admission it was thought that the patient likely needed an upper endoscopy once the patient's respiratory status had improved.  The patient also notes that she has had trouble swallowing some liquids and some solids as well.  She says that she feels that she simply cannot swallow and then will have some episodes of regurgitation.  Cardiology has been asked for \"clearance\" prior to an EGD.    From a cardiac standpoint, the patient notes that her shortness of breath has improved since being hospitalized.  She is still short of breath even at baseline but says that she is essentially back to normal in terms of her breathing at this time.  She continues to " "have lower extremity edema but also notes that she has some degree of chronic edema.  She denies having any chest pain.  It is noted that when she was evaluated in the cardiology clinic in January, she was complaining of some chest discomfort with both typical and atypical features at that time.  She underwent a stress test which was an abnormal stress test.  In discussion with the patient after the stress test, the patient had no further episodes of chest discomfort, therefore no further evaluation was thought to be warranted at that particular time.  The patient continues to deny having any chest pain at this time.    I have reviewed all elements of the patient's past medical, past surgical, home medications, allergies, family and social history with the patient and updated these in the medical record as needed.    Review of Systems: All pertinent negative and positives as noted above.  Otherwise, all systems reviewed and found to be negative.    Physical Exam:    BP (!) 184/66 (BP Location: Left arm, Patient Position: Sitting) Comment: Nurse Notifed  Pulse 74   Temp 97.9 °F (36.6 °C) (Oral)   Resp 16   Ht 167.6 cm (66\")   Wt 96.1 kg (211 lb 13.8 oz)   LMP  (LMP Unknown)   SpO2 97%   BMI 34.20 kg/m²   Temp:  [97.9 °F (36.6 °C)-98.4 °F (36.9 °C)] 97.9 °F (36.6 °C)  Heart Rate:  [74-82] 74  Resp:  [16] 16  BP: (148-186)/(47-84) 184/66    Gen.: Chronically ill in appearance, lying in bed awake alert and oriented ×3 and in no acute distress  HEENT: Normocephalic, atraumatic, pupils equally round and reactive to light, oropharynx clear, mucous membranes relatively dry  Neck: Large circumference, no obvious elevation of JVP, no thyromegaly, no carotid bruits  CV: Regular rate and rhythm, no appreciable murmurs, rub, gallop  Pulmonary: Decreased bilaterally but no current wheezes, rhonchi, rales appreciated  GI: Obese, soft, nontender, nondistended, active bowel sounds  Extremities: Ulnar deviation noted in " "bilateral hands, warm and well-perfused distal extremities, no dermatitis or ulceration, no clubbing, cyanosis, stasis dermatitis noted over the lower anterior pretibial surface with a somewhat shiny appearance, no erythema or warmth, trace ankle edema  Neurologic: Cranial nerves are grossly intact, patient moves all 4 extremities equally during examination    Diagnostic Data:    Lab Results   Component Value Date    WBC 6.89 05/29/2024    HGB 8.2 (L) 05/29/2024    HCT 27.7 (L) 05/29/2024    MCV 96.9 05/29/2024     05/29/2024     Lab Results   Component Value Date    GLUCOSE 95 05/29/2024    CALCIUM 8.1 (L) 05/29/2024     05/29/2024    K 3.7 05/29/2024    CO2 26.0 05/29/2024     05/29/2024    BUN 12 05/29/2024    CREATININE 0.83 05/29/2024    EGFRRESULT 50.8 (L) 05/16/2024    EGFR 75.9 05/29/2024    BCR 14.5 05/29/2024    ANIONGAP 9.0 05/29/2024     I reviewed the ECG performed on 5/20/2024 and after the review my interpretation is as follows: Normal sinus rhythm, ventricular rate 93, left axis deviation, poor R wave progression with nonspecific ST changes    ASSESSMENT/PLAN:    1.  Cardiac risk assessment  2.  Dysphagia  3.  Coronary artery disease in native coronary artery  4.  Anemia  5.  Primary hypertension  6.  Mixed hyperlipidemia  7.  Heart failure with preserved ejection fraction  8.  Prior DVT  9.  Sinus node dysfunction  10.  Proteus bacteremia due to Proteus UTI    -We are asked for \"clearance for EGD\" which is being performed due to the patient's complaints of dysphagia and based on the findings of esophageal thickening noted on CT scan.  While I cannot provide \"clearance\", I can say that an endoscopic procedure is considered a low risk procedure.  This patient does have a history of ischemic heart disease but currently has no unstable cardiac symptoms that would suggest active cardiac ischemia.  She also has a history of heart failure with preserved ejection fraction but appears to " be stabilized at this time with an improvement in shortness of breath and no evidence of significant volume overload on today's exam.  Therefore, the patient does have 2 risk factors based on the revised cardiac risk index.  Based on published guidelines and recommendations, this estimates a 10.1% risk.  This risk includes 30-day risk of death, MI or cardiac arrest.  Functional capacity is difficult to evaluate for this patient as she is relatively immobile, but I assume she has very poor functional capability.  However, given a lack of clinical symptoms, the low risk procedure that is anticipated, I would not necessarily feel that the patient would require any further workup prior to this endoscopic evaluation as any such workup would not necessarily lower this patient's risk as her risk is largely driven by the presence of nonmodifiable risk factors at this time (the presence of asymptomatic ischemic heart disease and heart failure with reduced ejection fraction).  However, it is also known that endoscopic procedures or any invasive procedures for that matter do carry an inherent risk that cannot be anticipated.  Even in that the absolute lowest risk patients, there is still a risk of death, myocardial infarction, cardiac arrest that would be unanticipated.  However in this particular patient, I do not see anything unstable about her from a cardiac standpoint that would require holding off on this procedure to allow further workup which might lower the inherent risk.  -Of note, Eliquis has been held but aspirin has been continued and should be given to this patient daily without interruption, especially given that she is off of anticoagulation, given an increased risk of late stent thrombosis that can occur with holding aspirin.  Therefore, recommend to continue aspirin without interruption.    -At this time, with these recommendations provided, we will be available as needed.  Please feel free to call if there  are further questions.

## 2024-05-29 NOTE — PLAN OF CARE
Goal Outcome Evaluation:  Plan of Care Reviewed With: patient, caregiver        Progress: no change  Outcome Evaluation: Patient rested well. Complains of pain x1 this shift. Turn q2h. NPO since midnight. Voiding via purewick. IV IID. VSS. Safety maintained.

## 2024-05-29 NOTE — PLAN OF CARE
NAME: Gianluca Hart    :1975      DOS: 2024    VAS Pain  How bad is your pain today? Please [place] your answer    RIGHT      LEFT        ASES Activity of Daily Living (ADL) Questionnaire  Please answer ALL questions for BOTH YOUR LEFT AND RIGHT shoulder.    [place] your ability to do the activities:  0 = Unable         1 = Very Difficult          2 = Somewhat Difficult            3 = Not Difficult     Activity LEFT Arm RIGHT Arm   1.   Put on a coat 0  1  2  3 0  1  2  3   2.   Sleep on your painful or affected aside 0  1  2  3 0  1  2  3   3.   Wash back / do up bra in back 0  1  2  3 0  1  2  3   4.   Manage toileting 0  1  2  3 0  1  2  3   5.   Comb hair 0  1  2  3 0  1  2  3   6.   Reach a high shelf 0  1  2  3 0  1  2  3   7.   Lift 10 lbs. above shoulder 0  1  2  3 0  1  2  3   8.   Throw a ball overhand 0  1  2  3 0  1  2  3   9.   Do usual work/chores    List___________________ 0  1  2  3 0  1  2  3   10. Do usual sport/hobby -  List:__________________ 0  1  2  3 0  1  2  3            How would you rate you affected shoulder today as a percentage of   normal (0-100% scale with 100% being normal):______________    -----------------------------SECTION TO BE COMPLETED BY PHYSICIAN---------------------------  VAS Pain   R _____/10  L_____/10    ASES (ADL):   R _____/30   L _____/30  Shoulder Score index:   R _____/100      L_____/100      [(10 - VAS) x 5] + [(5/3) x ADL(R or L)]    SANE:       R _____/100      L_____/100              PROMIS V.1.1 - Global Health     Please respond to each item by marking one box per row.       Score __/35               In the past 7 days….          Score:  ____/20                                       Goal Outcome Evaluation:  Plan of Care Reviewed With: patient        Progress: no change  Outcome Evaluation: ENDO CANCELLED TODAY. CARDIAC CONSULT - DR. MOJICA. ROOM AIR. CONT. PULSE OX INTACT. PURE WIK IN PLACE. EDEMA CONT. TO BILATERAL LOWER EXTREMITIES. SITTER AT BEDSIDE. NO DISTRESS NOTED.

## 2024-05-29 NOTE — PROGRESS NOTES
Inpatient Nutrition Services  Patient Name:  Tawny Shin  YOB: 1955  MRN: [id number]  Admit Date:  5/24/2024  Assessment Date:  5/29/2024   Reason for Assessment       Row Name 05/29/24 1322          Reason for Assessment    Reason For Assessment follow-up protocol     Diagnosis fluid status;infection/sepsis                    Nutrition/Diet History       Row Name 05/29/24 1322          Nutrition/Diet History    Typical Intake (Food/Fluid/EN/PN) Pt NPO for endo this morning, but attending note stated could not be completed today. Family and PCTs in room for care. Advanced back to soft to chew, chopped meat, heart healthy meal plan at this time. Boost Original twice daily continues.     Factors Affecting Nutritional Intake appetite                    Labs/Tests/Procedures/Meds       Row Name 05/29/24 1323          Labs/Procedures/Meds    Lab Results Reviewed reviewed        Diagnostic Tests/Procedures    Diagnostic Test/Procedure Reviewed reviewed        Medications    Pertinent Medications Reviewed reviewed     Pertinent Medications Comments See MAR                    Physical Findings       Row Name 05/29/24 1323          Physical Findings    Overall Physical Appearance Room air, 2+ edema to both arms, 3+ dependent edema (legs, feet), BM 5/28, David Score 17 with right gluteal incision.                    Estimated/Assessed Needs - Anthropometrics       Row Name 05/29/24 1325          Anthropometrics    Weight for Calculation 93.4 kg (206 lb)        Estimated/Assessed Needs    Additional Documentation Fluid Requirements (Group);KCAL/KG (Group);Protein Requirements (Group)        KCAL/KG    KCAL/KG 15 Kcal/Kg (kcal)     15 Kcal/Kg (kcal) 1401.615        Protein Requirements    Weight Used For Protein Calculations 59 kg (130 lb)  IBW     Est Protein Requirement Amount (gms/kg) 1.2 gm protein     Estimated Protein Requirements (gms/day) 70.76        Fluid Requirements    Fluid Requirements  (mL/day) 1402     RDA Method (mL) 1402                    Nutrition Prescription Ordered       Row Name 05/29/24 1326          Nutrition Prescription PO    Current PO Diet Soft Texture     Texture Chopped     Fluid Consistency Thin     Supplement Other (comment)  Boost Original     Supplement Frequency 2 times a day     Common Modifiers Cardiac                    Evaluation of Received Nutrient/Fluid Intake       Row Name 05/29/24 1326          Nutrient/Fluid Evaluation    Number of Days Evaluated 3 days        Fluid Intake Evaluation    Oral Fluid (mL) 354        PO Evaluation    Number of Days PO Intake Evaluated 3 days     Number of Meals 2     % PO Intake 75, 100                       Problem/Interventions:   Problem 1       Row Name 05/29/24 1326          Nutrition Diagnoses Problem 1    Problem 1 Swallowing Difficulty     Etiology (related to) Factors Affecting Nutrition     Reported/Observed By SLP     Oral Swallowing Difficulty     Other Reports trouble swallowing solids and liquids over the past few months.     Signs/Symptoms (evidenced by) SLP/Swallow eval     Swallow eval status Done     Type of SLP Evaluation Bedside     Resolved? Yes                    Problem 2       Row Name 05/29/24 1326          Nutrition Diagnoses Problem 2    Problem 2 Increased Nutrient Needs     Macronutrient Protein     Etiology (related to) Medical Diagnosis     Skin Surgical wound  right gluteal incision     Signs/Symptoms (evidenced by) Report of Minimal PO Intake     Resolved? Yes                    Problem 3       Row Name 05/29/24 1326          Nutrition Diagnoses Problem 3    Problem 3 Nutrition Appropriate for Condition at this Time                      Intervention Goal       Row Name 05/29/24 1327          Intervention Goal    General Disease management/therapy;Meet nutritional needs for age/condition     PO Tolerate PO;Continue positive trend;Meet estimated needs     Weight Maintain weight                     Nutrition Intervention       Row Name 05/29/24 1327          Nutrition Intervention    RD/Tech Action Follow Tx progress;Care plan reviewd;Supplement provided                    Nutrition Prescription       Row Name 05/29/24 1327          Nutrition Prescription PO    PO Prescription Other (comment)  continue same protocol                    Education/Evaluation       Row Name 05/29/24 1327          Education    Education No discharge needs identified at this time        Monitor/Evaluation    Monitor Per protocol                     Electronically signed by:  Gricel Deluca RDN, NILA  05/29/24 13:27 CDT

## 2024-05-29 NOTE — PROGRESS NOTES
INFECTIOUS DISEASES PROGRESS NOTE    Patient:  Tawny Shin  YOB: 1955  MRN: [id number]   Admit date: 2024   Admitting Physician: Kris Cade,*  Primary Care Physician: Moises Oliveira MD      Chief Complaint: No endoscopy today      Interval History: Patient notes that endoscopy was canceled by anesthesia due to requesting cardiology evaluation.    Allergies:   Allergies   Allergen Reactions    Atorvastatin Other (See Comments)     LEG CRAMPS      Amoxicillin Rash    Escitalopram Rash    Nabumetone Rash    Niacin Er Rash    Penicillin G Rash    Penicillins Rash    Simvastatin Rash       Current Scheduled Medications:   amLODIPine, 5 mg, Oral, Q24H  [Held by provider] apixaban, 5 mg, Oral, BID  ascorbic acid, 250 mg, Oral, Daily  aspirin, 81 mg, Oral, Daily  carvedilol, 25 mg, Oral, BID With Meals  cefTRIAXone, 2,000 mg, Intravenous, Q24H  DULoxetine, 30 mg, Oral, Daily  famotidine, 20 mg, Oral, BID AC  ferrous sulfate, 325 mg, Oral, Daily With Breakfast  First Mouthwash (Magic Mouthwash), 5 mL, Swish & Spit, Q6H  folic acid, 1,000 mcg, Oral, Daily  isosorbide mononitrate, 60 mg, Oral, Daily  leflunomide, 20 mg, Oral, Daily  levothyroxine, 75 mcg, Oral, Q AM  Lidocaine, 2 patch, Transdermal, Q24H  losartan, 50 mg, Oral, Daily  multivitamin with minerals, 1 tablet, Oral, Daily  predniSONE, 5 mg, Oral, Daily  sodium chloride, 10 mL, Intravenous, Q12H      Current PRN Medications:    acetaminophen    albuterol    Biotene dry mouth    senna-docusate sodium **AND** polyethylene glycol **AND** bisacodyl **AND** bisacodyl    Diclofenac Sodium    magnesium hydroxide    nitroglycerin    ondansetron    sodium chloride    sodium chloride    sodium chloride    traMADol    valACYclovir            Objective     Vital Signs:  Temp (24hrs), Av °F (36.7 °C), Min:97.9 °F (36.6 °C), Max:98.4 °F (36.9 °C)      BP (!) 184/66 (BP Location: Left arm, Patient Position: Sitting) Comment: Nurse  "Notifed  Pulse 74   Temp 97.9 °F (36.6 °C) (Oral)   Resp 16   Ht 167.6 cm (66\")   Wt 96.1 kg (211 lb 13.8 oz)   LMP  (LMP Unknown)   SpO2 97%   BMI 34.20 kg/m²         Physical Exam:    General: The patient is sitting up in bed eating and appears in no acute distress  Abdomen: No rebound or guarding appreciated  Neuro: Appears a bit stronger    Results Review:    I reviewed the patient's new clinical results.    Lab Results:    CBC:   Lab Results   Lab 05/24/24  1051 05/25/24  0537 05/26/24 0419 05/27/24 0455 05/28/24 0411 05/29/24 0437   WBC 6.65 8.09 6.77 5.70 5.68 6.89   HEMOGLOBIN 8.2* 7.5* 7.7* 7.5* 8.0* 8.2*   HEMATOCRIT 26.7* 24.6* 25.1* 25.1* 26.9* 27.7*   PLATELETS 115* 114* 127* 131* 177 185        AutoDiff:   Lab Results   Lab 05/24/24  1051 05/25/24 0537   NEUTROPHIL % 66.3 67.5   LYMPHOCYTE % 20.3 17.8*   MONOCYTES % 11.3 12.7*   EOSINOPHIL % 0.2* 0.1*   BASOPHIL % 0.5 0.4   NEUTROS ABS 4.42 5.46   LYMPHS ABS 1.35 1.44   MONOS ABS 0.75 1.03*   EOS ABS 0.01 0.01   BASOS ABS 0.03 0.03        Manual Diff:    Lab Results   Lab 05/24/24  1051 05/25/24  0537   NEUTROS ABS 4.42 5.46           CMP:   Lab Results   Lab 05/24/24  1051 05/25/24  0537 05/26/24 0419 05/27/24 0455 05/28/24 0411 05/29/24  0437   SODIUM 136 141   < > 141 140 142   POTASSIUM 3.6 3.5   < > 3.5 4.0 3.7   CHLORIDE 103 103   < > 106 106 107   CO2 23.0 27.0   < > 25.0 27.0 26.0   BUN 15 22   < > 21 15 12   CREATININE 1.08* 1.28*   < > 1.14* 0.92 0.83   CALCIUM 8.6 8.5*   < > 7.9* 8.1* 8.1*   BILIRUBIN 0.7 0.5  --   --   --   --    ALK PHOS 66 62  --   --   --   --    ALT (SGPT) 30 26  --   --   --   --    AST (SGOT) 39* 26  --   --   --   --    GLUCOSE 105* 106*   < > 84 77 95    < > = values in this interval not displayed.       Estimated Creatinine Clearance: 73.7 mL/min (by C-G formula based on SCr of 0.83 mg/dL).    Culture Results:    Microbiology Results (last 10 days)       Procedure Component Value - Date/Time    " Urine Culture - Urine, Urine, Clean Catch [[id number]]  (Abnormal)  (Susceptibility) Collected: 05/24/24 1312    Lab Status: Final result Specimen: Urine, Clean Catch Updated: 05/26/24 1110     Urine Culture >100,000 CFU/mL Proteus mirabilis    Narrative:      Colonization of the urinary tract without infection is common. Treatment is discouraged unless the patient is symptomatic, pregnant, or undergoing an invasive urologic procedure.    Susceptibility        Proteus mirabilis      LICHA      Amoxicillin + Clavulanate Susceptible      Ampicillin Susceptible      Ampicillin + Sulbactam Susceptible      Cefazolin Susceptible      Cefepime Susceptible      Ceftazidime Susceptible      Ceftriaxone Susceptible      Gentamicin Susceptible      Levofloxacin Susceptible      Nitrofurantoin Resistant      Piperacillin + Tazobactam Susceptible      Trimethoprim + Sulfamethoxazole Susceptible                           Blood Culture - Blood, Arm, Right [[id number]]  (Normal) Collected: 05/24/24 1110    Lab Status: Final result Specimen: Blood from Arm, Right Updated: 05/29/24 1145     Blood Culture No growth at 5 days    COVID-19 and FLU A/B PCR, 1 HR TAT - Swab, Nasopharynx [[id number]]  (Normal) Collected: 05/24/24 1106    Lab Status: Final result Specimen: Swab from Nasopharynx Updated: 05/24/24 1136     COVID19 Not Detected     Influenza A PCR Not Detected     Influenza B PCR Not Detected    Narrative:      Fact sheet for providers: https://www.fda.gov/media/424316/download    Fact sheet for patients: https://www.fda.gov/media/154118/download    Test performed by PCR.    Blood Culture - Blood, Hand, Digit Right [[id number]]  (Abnormal)  (Susceptibility) Collected: 05/24/24 1051    Lab Status: Final result Specimen: Blood from Hand, Digit Right Updated: 05/27/24 0519     Blood Culture Proteus mirabilis     Isolated from Aerobic Bottle     Gram Stain Aerobic Bottle Gram negative bacilli    Susceptibility        Proteus mirabilis       LICHA      Amoxicillin + Clavulanate Susceptible      Ampicillin Susceptible      Ampicillin + Sulbactam Susceptible      Cefepime Susceptible      Ceftazidime Susceptible      Ceftriaxone Susceptible      Gentamicin Susceptible      Levofloxacin Susceptible      Piperacillin + Tazobactam Susceptible      Trimethoprim + Sulfamethoxazole Susceptible                       Susceptibility Comments       Proteus mirabilis    Cefazolin sensitivity will not be reported for Enterobacteriaceae in non-urine isolates. If cefazolin is preferred, please call the microbiology lab to request an E-test.  With the exception of urinary-sourced infections, aminoglycosides should not be used as monotherapy.               Blood Culture ID, PCR - Blood, Hand, Digit Right [[id number]]  (Abnormal) Collected: 05/24/24 1051    Lab Status: Final result Specimen: Blood from Hand, Digit Right Updated: 05/25/24 0648     BCID, PCR Proteus spp. Identification by BCID2 PCR.     BOTTLE TYPE Aerobic Bottle    Narrative:      No resistance genes detected.                 Radiology:   Imaging Results (Last 72 Hours)       ** No results found for the last 72 hours. **                Active Hospital Problems    Diagnosis     **Fluid overload     Dysphagia     Hypothyroidism     Paralysis     History of urinary retention     Cholelithiasis     Essential hypertension     Coronary artery disease involving native coronary artery of native heart without angina pectoris     Sick sinus syndrome     Venous insufficiency     Overweight (BMI 25.0-29.9)     Rheumatoid arthritis involving multiple sites     (HFpEF) heart failure with preserved ejection fraction     Chronic anticoagulation     Iron deficiency anemia     Osteoporosis     Chronic embolism and thrombosis of unspecified deep veins of left lower extremity     T12 compression fracture, initial encounter        IMPRESSION:  Proteus bacteremia secondary to Proteus UTI   HX of recurrent UTI -from organisms  noted over the last 3 admissions.  Recent HX CRE bacteremia  Remote HX of MSSA bacteremia  Immunocompromised patient with RA on leflunamide/prednisone  Dysphagia for last few months  Anemia  Thrombocytopenia-normal again today.  Presumed secondary to infection on admission    RECOMMENDATION:   Continue ceftriaxone-plan to complete 7 full days of IV antibiotic therapy and likely transition to oral  Continue to recommend the following to reduce recurrent infections #1 drinking approximately 2 L of water a day, #2 intravaginal estrogen, 3 methenamine hippurate.   Contact isolation given hx of MDRO  Daniel from infectious diseases standpoint to proceed with EGD        Maggie Bang MD  05/29/24  13:36 CDT

## 2024-05-29 NOTE — THERAPY TREATMENT NOTE
Acute Care - Physical Therapy Treatment Note  [facility]     Patient Name: Tawny Shin  : 1955  MRN: [id number]  Today's Date: 2024   Onset of Illness/Injury or Date of Surgery: 24  Visit Dx:     ICD-10-CM ICD-9-CM   1. Hypervolemia, unspecified hypervolemia type  E87.70 276.69   2. Debility  R53.81 799.3   3. Esophageal dysphagia  R13.19 787.29   4. Impaired mobility [Z74.09]  Z74.09 799.89   5. Iron deficiency anemia, unspecified iron deficiency anemia type  D50.9 280.9   6. Abnormal CT of the abdomen  R93.5 793.6   7. Dysphagia, unspecified type  R13.10 787.20     Patient Active Problem List   Diagnosis    T12 compression fracture, initial encounter    Chronic embolism and thrombosis of unspecified deep veins of left lower extremity    Chronic anticoagulation    Iron deficiency anemia    Osteoporosis    E coli bacteremia    Epidural hematoma    Pleural effusion, left    Functional neurological symptom disorder with weakness or paralysis    Overweight (BMI 25.0-29.9)    Rheumatoid arthritis involving multiple sites    (HFpEF) heart failure with preserved ejection fraction    Venous insufficiency    Coronary artery disease involving native coronary artery of native heart without angina pectoris    Sick sinus syndrome    Essential hypertension    Pressure injury of skin of heel    Chronic pain    Anemia of chronic disease    Cholelithiasis    Pressure injury of skin of buttock    Near functional paraplegia    Skin cancer    Squamous cell carcinoma of back    Hematoma    Right-sided chest wall pain    Hyperlipidemia LDL goal <70    Malodorous urine    Stage 3b chronic kidney disease    Cyst of buttocks    Sepsis due to Escherichia coli without acute organ dysfunction    Generalized weakness    Paralysis    History of urinary retention    Bacteremia due to Enterobacter species    Bacteremia    Hypothyroidism    Fluid overload    Dysphagia     Past Medical History:   Diagnosis Date     "Age-related osteoporosis with current pathological fracture 05/27/2020    Arthritis     Asthma     Bilateral bunions 12/23/2020    Cancer     Cardiac pacemaker syndrome 12/23/2020    Overview:  - heart block - implanted 11/16    Charcot's joint of foot, left 12/23/2020    Chronic deep vein thrombosis (DVT) of right lower extremity 06/23/2021    Chronic pain syndrome 06/22/2021    Chronic sinusitis     COPD (chronic obstructive pulmonary disease)     Coronary artery disease     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    Elevated cholesterol     Eustachian tube dysfunction     Heart disease     Herpes simplex     History of transfusion     Hyperlipidemia     Hypertension     Hypothyroidism 12/23/2020    Intrinsic asthma 12/23/2020    Knee dislocation     Labral tear of right hip joint     Laryngitis sicca     Laryngitis, chronic     Left carotid bruit 03/09/2016    MI (myocardial infarction)     Myalgia due to statin 06/25/2019    Open wound of right hip 09/14/2021    Osteomyelitis of right femur 07/06/2021    Otorrhea     Pacemaker 11/17/2016    Primary osteoarthritis of left knee 12/23/2020    Psoriasis vulgaris 12/23/2020    S/P coronary artery stent placement 03/09/2016    Sensorineural hearing loss     Seropositive rheumatoid arthritis of multiple sites 12/27/2019    Overview:  -myochrysine '93-'96 -methotrexate '96--->11/98;r/s  restarted 2/99--> 8/14 (anemia) -sulfasalazine- not effective -penicillamine 6/98-->10/98; no effect -leflunomide 11/98--> - Humira '13-->didn't take - Enbrel 12/14-->3/15- no effect!   Last Assessment & Plan:  - \"aching all over\" because she had to be off her anti-rheumatic drugs for 2 weeks in preparation for her R knee surgery - he    Sick sinus syndrome 12/27/2019    Sjogren's disease     Spondylolisthesis of lumbar region 01/17/2018    Syncope, recurrent 02/08/2021    Urinary tract infection     UTI (urinary tract infection) 04/19/2024     Past Surgical History:   Procedure " Laterality Date    A-V CARDIAC PACEMAKER INSERTION  2016    ATRIAL CARDIAC PACEMAKER INSERTION      CARDIAC CATHETERIZATION      CATARACT EXTRACTION      CERVICAL CORPECTOMY N/A 3/3/2021    Procedure: CERVICAL 6 CORPECTOMY WITH TITANIUM CAGE WITH NEURO MONITORING;  Surgeon: Bandar Shea MD;  Location:  [facility];  Service: Neurosurgery;  Laterality: N/A;    COLONOSCOPY  11/08/2011    One fold in the ascending colon which showed ulcer otherwise normal exam    COLONOSCOPY  11/12/2004    Normal exam repeat in five years    CORONARY ANGIOPLASTY WITH STENT PLACEMENT      X 2; 2013 & 2014    ENDOSCOPY  07/10/2014    Normal exam    FLAP LEG Right 9/14/2021    Procedure: RIGHT GLUTEAL FASCIOCUTANEOUS ADVANCEMENT FLAP AND RIGHT TENSOR FASCIAL JESSICA FLAP;  Surgeon: Amadeo Turner MD;  Location:  [facility];  Service: Plastics;  Laterality: Right;    HIP ABDUCTION TENOTOMY BILATERAL Right 1/14/2021    Procedure: RIGHT HIP GLUTEUS MEDLUS / MINIMUS REPAIR, POSSIBLE ACHILLES ALLOGRAFT;  Surgeon: Nino Carlson MD;  Location:  [facility];  Service: Orthopedics;  Laterality: Right;    INCISION AND DRAINAGE ABSCESS Right 6/4/2022    Procedure: INCISION AND DRAINAGE ABSCESS right hip;  Surgeon: Magda Salcido MD;  Location:  [facility];  Service: General;  Laterality: Right;    INCISION AND DRAINAGE ABSCESS Right 6/10/2022    Procedure: RIGHT HIP INCISION AND DRAINAGE. MD NEEDS 3L VANC IRRIGATION, CURRETTES, DAICANS, KERLEX ROLLS;  Surgeon: Amadeo Turner MD;  Location:  [facility];  Service: Plastics;  Laterality: Right;    INCISION AND DRAINAGE HIP Right 2/9/2021    Procedure: HIP INCISION AND DRAINAGE;  Surgeon: Nino Carlson MD;  Location:  [facility];  Service: Orthopedics;  Laterality: Right;    INCISION AND DRAINAGE LEG Right 10/24/2021    Procedure: INCISION AND DRAINAGE LOWER EXTREMITY;  Surgeon: Amadeo Turner MD;  Location:  [facility];  Service: Plastics;  Laterality: Right;    INCISION AND DRAINAGE OF WOUND  Right 7/8/2021    Procedure: INCISION AND DRAINAGE WOUND RIGHT HIP;  Surgeon: James Huntley MD;  Location:  [facility];  Service: Orthopedics;  Laterality: Right;    JOINT REPLACEMENT      KYPHOPLASTY WITH BIOPSY Bilateral 10/26/2021    Procedure: THOARCIC 12 KYPHOPLASTY WITH BIOPSY;  Surgeon: Bandar Shea MD;  Location:  [facility];  Service: Neurosurgery;  Laterality: Bilateral;    LEG DEBRIDEMENT Right 9/14/2021    Procedure: DEBRIDEMENT OF RIGHT HIP WOUND, RIGHT GLUTEAL FASCIOCUTANEOUS ADVANCEMENT FLAP AND RIGHT TENSOR FASCIAL JESSICA FLAP;  Surgeon: Amadeo Turner MD;  Location:  [facility];  Service: Plastics;  Laterality: Right;    LUMBAR DISCECTOMY Right 3/23/2021    Procedure: LUMBAR DISCECTOMY MICRO, Lumbar 1/2 right;  Surgeon: Bandar Shea MD;  Location:  [facility];  Service: Neurosurgery;  Laterality: Right;    LUMBAR FUSION N/A 1/19/2018    Procedure: L3-4,L4-5 DECOMPRESSION, POSTERIOR SPINAL FUSION WITH INSTRUMENTATION;  Surgeon: Fortino Oropeza MD;  Location:  [facility];  Service:     LUMBAR LAMINECTOMY WITH FUSION Left 1/17/2018    Procedure: LEFT L3-4 L4-5 LATERAL LUMBAR INTERBODY FUSION;  Surgeon: Fortino Oropeza MD;  Location:  [facility];  Service:     MASS EXCISION Right 4/23/2024    Procedure: RIGHT BUTTOCK MASS EXCISION;  Surgeon: Moises Keyes MD;  Location:  [facility];  Service: General;  Laterality: Right;    MYRINGOTOMY W/ TUBES  09/04/2014    TUBES NO LONGER IN PLACE    OTHER SURGICAL HISTORY      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 so hardware was removed and spacers were placed    REPLACEMENT TOTAL KNEE Right      PT Assessment (Last 12 Hours)       PT Evaluation and Treatment       Row Name 05/29/24 1519          Physical Therapy Time and Intention    Subjective Information complains of;weakness  -KJ     Document Type therapy note (daily note)  -KJ     Mode of Treatment physical therapy  -KJ     Patient Effort good  -KJ       Row Name 05/29/24 9186           General Information    Existing Precautions/Restrictions fall  -KJ       Row Name 05/29/24 1512          Pain    Pretreatment Pain Rating 0/10 - no pain  -KJ     Posttreatment Pain Rating 0/10 - no pain  -KJ       Row Name 05/29/24 1512          Bed Mobility    Rolling Left [place] (Bed Mobility) minimum assist (75% patient effort)  -KJ     Rolling Right [place] (Bed Mobility) minimum assist (75% patient effort)  -KJ     Supine-Sit [place] (Bed Mobility) verbal cues;moderate assist (50% patient effort)  -KJ     Sit-Supine [place] (Bed Mobility) moderate assist (50% patient effort)  -KJ       Row Name 05/29/24 1512          Sit-Stand Transfer    Sit-Stand [place] (Transfers) minimum assist (75% patient effort);moderate assist (50% patient effort);verbal cues  -KJ     Assistive Device (Sit-Stand Transfers) walker, front-wheeled  -KJ       Row Name 05/29/24 1512          Stand-Sit Transfer    Stand-Sit [place] (Transfers) minimum assist (75% patient effort)  -KJ       Row Name 05/29/24 1512          Gait/Stairs (Locomotion)    Comment, (Gait/Stairs) stood several times at bedside; improved with each stand. Able to take steps towards head of bed  -KJ       Row Name 05/29/24 1512          Aerobic Exercise    Comment, Aerobic Exercise (Therapeutic Exercise) AROM sitting  -KJ       Row Name             Wound 04/23/24 0936 Right gluteal Incision    Wound - Properties Group Placement Date: 04/23/24  -EP Placement Time: 0936  -EP Present on Original Admission: N  -EP Side: Right  -EP Location: gluteal  -EP Primary Wound Type: Incision  -EP    Retired Wound - Properties Group Placement Date: 04/23/24  -EP Placement Time: 0936  -EP Present on Original Admission: N  -EP Side: Right  -EP Location: gluteal  -EP Primary Wound Type: Incision  -EP    Retired Wound - Properties Group Date first assessed: 04/23/24  -EP Time first assessed: 0936  -EP Present on Original Admission: N  -EP Side: Right  -EP  Location: gluteal  -EP Primary Wound Type: Incision  -EP      Row Name 05/29/24 1512          Positioning and Restraints    Pre-Treatment Position in bed  -KJ     Post Treatment Position bed  -KJ               User Key  (r) = Recorded By, (t) = Taken By, (c) = Cosigned By      Initials Name Provider Type    Emilee Gloria, PTA Physical Therapist Assistant    Sonny Thompson RN Registered Nurse                    Physical Therapy Education       Title: PT OT SLP Therapies (In Progress)       Topic: Physical Therapy (In Progress)       Point: Mobility training (Done)       Learning Progress Summary             Patient Acceptance, E, VU by MS at 5/26/2024 0910    Comment: role of PT in her care                         Point: Home exercise program (Not Started)       Learner Progress:  Not documented in this visit.              Point: Body mechanics (Not Started)       Learner Progress:  Not documented in this visit.              Point: Precautions (Not Started)       Learner Progress:  Not documented in this visit.                              User Key       Initials Effective Dates Name Provider Type Discipline    MS 07/11/23 -  Nicole Olson, PT, DPT, NCS Physical Therapist PT                  PT Recommendation and Plan     Plan of Care Reviewed With: patient  Progress: improving  Outcome Evaluation: PT tx completed. Pt has reports she feels weak, she is typically able to get out of bed and take a few steps.  Required Beata bed mobility, Beata rolling, Beata sit<>stand, took a few side steps towards head of bed utilizing  rwx. At this time recommend cont PT for strengthening.   Outcome Measures       Row Name 05/29/24 1500 05/28/24 1100          How much help from another person do you currently need...    Turning from your back to your side while in flat bed without using bedrails? 2  -KJ 3  -KJ     Moving from lying on back to sitting on the side of a flat bed without bedrails? 2  -KJ 3  -KJ     Moving to and  from a bed to a chair (including a wheelchair)? 2  -KJ 2  -KJ     Standing up from a chair using your arms (e.g., wheelchair, bedside chair)? 2  -KJ 2  -KJ     Climbing 3-5 steps with a railing? 1  -KJ 1  -KJ     To walk in hospital room? 2  -KJ 2  -KJ     AM-PAC 6 Clicks Score (PT) 11  -KJ 13  -KJ     Highest Level of Mobility Goal 4 --> Transfer to chair/commode  -KJ 4 --> Transfer to chair/commode  -KJ        Functional Assessment    Outcome Measure Options AM-PAC 6 Clicks Basic Mobility (PT)  -KJ AM-PAC 6 Clicks Basic Mobility (PT)  -KJ               User Key  (r) = Recorded By, (t) = Taken By, (c) = Cosigned By      Initials Name Provider Type    Emilee Gloria PTA Physical Therapist Assistant                     Time Calculation:    PT Charges       Row Name 05/29/24 1548             Time Calculation    Start Time 1512  -KJ      Stop Time 1551  -KJ      Time Calculation (min) 39 min  -KJ      PT Received On 05/29/24  -KJ      PT Goal Re-Cert Due Date 06/05/24  -KJ         Time Calculation- PT    Total Timed Code Minutes- PT 39 minute(s)  -KJ                User Key  (r) = Recorded By, (t) = Taken By, (c) = Cosigned By      Initials Name Provider Type    Emilee Gloria PTA Physical Therapist Assistant                  Therapy Charges for Today       Code Description Service Date Service Provider Modifiers Qty    27983206738 HC PT THER PROC EA 15 MIN 5/28/2024 Emilee Paul, PTA GP 1    60471231370 HC PT THERAPEUTIC ACT EA 15 MIN 5/28/2024 Emilee Paul, SERENA GP 2    33782119312 HC PT THERAPEUTIC ACT EA 15 MIN 5/29/2024 Emilee Paul PTA GP 2    47064374715 HC PT THER PROC EA 15 MIN 5/29/2024 Emilee Paul, PTA GP 1            PT G-Codes  Outcome Measure Options: AM-PAC 6 Clicks Basic Mobility (PT)  AM-PAC 6 Clicks Score (PT): 11  AM-PAC 6 Clicks Score (OT): 15    Emilee Paul PTA  5/29/2024

## 2024-05-29 NOTE — PROGRESS NOTES
[facility] Medicine Services  INPATIENT PROGRESS NOTE    Patient Name: Tawny Shin  Date of Admission: 5/24/2024  Today's Date: 05/29/24  Length of Stay: 5  Primary Care Physician: Moises Oliveira MD    Subjective   Chief Complaint: Weakness   HPI   She could not undergo EGD this morning due to require cardiology clearance prior to procedure.     Review of Systems   All pertinent negatives and positives are as above. All other systems have been reviewed and are negative unless otherwise stated.     Objective    Temp:  [97.8 °F (36.6 °C)-98.4 °F (36.9 °C)] 97.9 °F (36.6 °C)  Heart Rate:  [74-82] 74  Resp:  [16] 16  BP: (142-186)/(47-84) 184/66  Physical Exam  Vitals and nursing note reviewed.   Constitutional:       Comments: Chronically ill.   HENT:      Head: Normocephalic.   Eyes:      Conjunctiva/sclera: Conjunctivae normal.      Pupils: Pupils are equal, round, and reactive to light.   Cardiovascular:      Rate and Rhythm: Normal rate and regular rhythm.      Heart sounds: Normal heart sounds.   Pulmonary:      Effort: Pulmonary effort is normal. No respiratory distress.      Breath sounds: Normal breath sounds.   Abdominal:      General: Bowel sounds are normal. There is no distension.      Palpations: Abdomen is soft.      Tenderness: There is no abdominal tenderness.      Comments: Obesity .   Musculoskeletal:         General: No swelling.      Cervical back: Neck supple.      Comments: Deformities of the hand from arthritis.   Skin:     General: Skin is warm and dry.      Capillary Refill: Capillary refill takes 2 to 3 seconds.      Findings: No rash.   Neurological:      Mental Status: She is alert and oriented to person, place, and time.      Motor: Weakness present.      Coordination: Coordination abnormal.      Gait: Gait abnormal.   Psychiatric:         Mood and Affect: Mood normal.         Behavior: Behavior normal.         Thought Content: Thought content  normal.       Results Review:  I have reviewed the labs, radiology results, and diagnostic studies.    Laboratory Data:   Results from last 7 days   Lab Units 05/29/24 0437 05/28/24 0411 05/27/24 0455   WBC 10*3/mm3 6.89 5.68 5.70   HEMOGLOBIN g/dL 8.2* 8.0* 7.5*   HEMATOCRIT % 27.7* 26.9* 25.1*   PLATELETS 10*3/mm3 185 177 131*        Results from last 7 days   Lab Units 05/29/24 0437 05/28/24 0411 05/27/24 0455 05/26/24 0419 05/25/24  0537 05/24/24  1051   SODIUM mmol/L 142 140 141   < > 141 136   POTASSIUM mmol/L 3.7 4.0 3.5   < > 3.5 3.6   CHLORIDE mmol/L 107 106 106   < > 103 103   CO2 mmol/L 26.0 27.0 25.0   < > 27.0 23.0   BUN mg/dL 12 15 21   < > 22 15   CREATININE mg/dL 0.83 0.92 1.14*   < > 1.28* 1.08*   CALCIUM mg/dL 8.1* 8.1* 7.9*   < > 8.5* 8.6   BILIRUBIN mg/dL  --   --   --   --  0.5 0.7   ALK PHOS U/L  --   --   --   --  62 66   ALT (SGPT) U/L  --   --   --   --  26 30   AST (SGOT) U/L  --   --   --   --  26 39*   GLUCOSE mg/dL 95 77 84   < > 106* 105*    < > = values in this interval not displayed.       Culture Data:   Blood Culture   Date Value Ref Range Status   05/24/2024 No growth at 2 days  Preliminary   05/24/2024 Proteus mirabilis (C)  Final     Urine Culture   Date Value Ref Range Status   05/24/2024 >100,000 CFU/mL Proteus mirabilis (A)  Final       Radiology Data:   Imaging Results (Last 24 Hours)       ** No results found for the last 24 hours. **            I have reviewed the patient's current medications.     Assessment/Plan   Assessment  Active Hospital Problems    Diagnosis     **Fluid overload     Dysphagia     Hypothyroidism     Paralysis     History of urinary retention     Cholelithiasis     Essential hypertension     Coronary artery disease involving native coronary artery of native heart without angina pectoris     Sick sinus syndrome     Venous insufficiency     Overweight (BMI 25.0-29.9)     Rheumatoid arthritis involving multiple sites     (HFpEF) heart failure with  preserved ejection fraction     Chronic anticoagulation     Iron deficiency anemia     Osteoporosis     Chronic embolism and thrombosis of unspecified deep veins of left lower extremity     T12 compression fracture, initial encounter        Treatment Plan  Shortness of breath.  Vascular congestion.  Patient received 80 of Lasix in ER. Improved.  Chest x-ray-Low lung volumes. Stable cardiomegaly with similar positioning of the  left subclavian cardiac pacer leads- Vascular crowding versus mild venous congestion,  No consolidation.  CT of the chest-Small bilateral pleural fluid and small layering groundglass opacity  at the fissures- could represent small pulmonary edema/fluid or atelectasis, Gallstone at the proximal gallbladder neck or cystic duct- Partially visualized gallbladder appears distended- not optimally evaluated on this exam, Diffuse esophageal thickening of the mid and distal esophagus- is new compared to 4/22/2022, recommend referral to gastroenterology for consideration of direct visualization.  Patient is on room air.    CAD/hypertension/hyperlipidemia/pacemaker due to history of sick sinus syndrome.  Aspirin. .  BNP 5000 in ER.  Patient received 80 of Lasix in the ER.  Imdur .  Continue Cozaar.  Nitro as needed.  Coreg.  Daily weight.  Strict in and out.    Allergic to statin  History of positive stress test 2/9/24    Abnormal stress echo with echocardiographic evidence for myocardial ischemia located in the lateral wall consistent with a high risk study for myocardial ischemia.    The following left ventricular wall segments are hypokinetic: mid anterolateral, apical lateral and mid inferolateral.    Left ventricular systolic function is normal at rest.  Consult cardiology for recommendations and EGD clearance.    Gallstone proximal gallbladder neck and cystic duct/diffuse esophageal thickening of mid and distal esophagus. GI consult input noted  Ultrasound the gallbladder-Gallstone with no sign of  "cholecystitis.   Dysphagia/CT noted thickened mid/distal esophagus  GI recommendation-no additional workup for cholelithiasis until patients become symptomatic, dysphagia for last couple months-plan for upper endoscopy today deferred due to cardiac risk, outpatient colonoscopy because this past due.      UTI.     Infect disease consult input noted.   Cefepime narrowed to Rocephin  \"reduce recurrent infections by drinking approximately 2 L of water a day, intravaginal estrogen, consideration for methenamine hippurate\"  History of CRE.      Chronic stage IIIb renal failure.  Creatinine increased to 1.52, now 1.14, probably secondary to Lasix one-time dose in ER 80 mg.     Rheumatoid arthritis/Hx Sjogren .  Biotene.  Magic mouthwash. Arava.  Prednisone daily.     History of DVT . Eliquis on hold for EGD.       Depression/anxiety.  Cymbalta.     Hypokalemia.  P.o. potassium.  Magnesium level 1.8     Anemia.  Hemoglobin stable..  No sign of acute bleed.  Folic acid.  Iron sulfate.     Thrombocytopenia.  Platelet counts increasing.     Hypothyroidism . Synthroid.  TSH-normal.     Reflux.  Tums.  Zofran as needed.  Pepcid IV to PO     Constipation.  Magnesium oxide as needed.  Constipation protocol  as needed.  .     Pain.  Voltaren gel as needed.  Lidocaine patch as needed.  Ultram as needed.     Nutrition . Folic acid.  Cardiac diet.  Boost supplement.  Vitamin C.     Deconditioning.  PT and OT consult.  Patient get around with a walker at baseline.     Dr. Shin's mom.     Blood cultures Proteus 1 out of 2 bottles.  Urine culture-Proteus from UTI.      Medical Decision Making  Number and Complexity of problems: Shortness of breath/CHF exacerbation/gallstones/thickening esophagus/fail to thrive/rheumatoid arthritis/CAD/  Differential Diagnosis: None     Conditions and Status        Condition is unchanged.     [facility] Data  External documents reviewed: Previous note .  Cardiac tracing (EKG, telemetry) interpretation: Sinus " rhythm .  Radiology interpretation: CT scan/x-ray  Labs reviewed: Laboratory  Any tests that were considered but not ordered: Laboratory in AM     Decision rules/scores evaluated (example [id number], Wells, etc): None     Discussed with: Patient and caregiver.     Care Planning  Shared decision making: Patient and caregiver.  Code status and discussions: Full code.     Disposition  Social Determinants of Health that impact treatment or disposition: From home.  Estimated length of stay is 1 to 4 days.        Electronically signed by Kris Cade MD, 05/29/24, 12:36 CDT.

## 2024-05-30 ENCOUNTER — ANESTHESIA (OUTPATIENT)
Dept: GASTROENTEROLOGY | Facility: HOSPITAL | Age: 69
End: 2024-05-30
Payer: MEDICARE

## 2024-05-30 ENCOUNTER — ANESTHESIA EVENT (OUTPATIENT)
Dept: GASTROENTEROLOGY | Facility: HOSPITAL | Age: 69
End: 2024-05-30
Payer: MEDICARE

## 2024-05-30 PROBLEM — R93.5 ABNORMAL CT OF THE ABDOMEN: Status: ACTIVE | Noted: 2024-05-24

## 2024-05-30 LAB
ANION GAP SERPL CALCULATED.3IONS-SCNC: 7 MMOL/L (ref 5–15)
BUN SERPL-MCNC: 12 MG/DL (ref 8–23)
BUN/CREAT SERPL: 14 (ref 7–25)
CALCIUM SPEC-SCNC: 8 MG/DL (ref 8.6–10.5)
CHLORIDE SERPL-SCNC: 107 MMOL/L (ref 98–107)
CO2 SERPL-SCNC: 27 MMOL/L (ref 22–29)
CREAT SERPL-MCNC: 0.86 MG/DL (ref 0.57–1)
DEPRECATED RDW RBC AUTO: 62.5 FL (ref 37–54)
EGFRCR SERPLBLD CKD-EPI 2021: 72.8 ML/MIN/1.73
ERYTHROCYTE [DISTWIDTH] IN BLOOD BY AUTOMATED COUNT: 17.5 % (ref 12.3–15.4)
GLUCOSE SERPL-MCNC: 96 MG/DL (ref 65–99)
HCT VFR BLD AUTO: 27.6 % (ref 34–46.6)
HGB BLD-MCNC: 8 G/DL (ref 12–15.9)
HOLD SPECIMEN: NORMAL
MCH RBC QN AUTO: 28.5 PG (ref 26.6–33)
MCHC RBC AUTO-ENTMCNC: 29 G/DL (ref 31.5–35.7)
MCV RBC AUTO: 98.2 FL (ref 79–97)
PLATELET # BLD AUTO: 187 10*3/MM3 (ref 140–450)
PMV BLD AUTO: 10.4 FL (ref 6–12)
POTASSIUM SERPL-SCNC: 4.3 MMOL/L (ref 3.5–5.2)
RBC # BLD AUTO: 2.81 10*6/MM3 (ref 3.77–5.28)
SODIUM SERPL-SCNC: 141 MMOL/L (ref 136–145)
WBC NRBC COR # BLD AUTO: 6.11 10*3/MM3 (ref 3.4–10.8)

## 2024-05-30 PROCEDURE — 25010000002 CEFTRIAXONE PER 250 MG: Performed by: INTERNAL MEDICINE

## 2024-05-30 PROCEDURE — 25010000002 PROPOFOL 10 MG/ML EMULSION: Performed by: NURSE ANESTHETIST, CERTIFIED REGISTERED

## 2024-05-30 PROCEDURE — C1726 CATH, BAL DIL, NON-VASCULAR: HCPCS | Performed by: INTERNAL MEDICINE

## 2024-05-30 PROCEDURE — 88305 TISSUE EXAM BY PATHOLOGIST: CPT | Performed by: INTERNAL MEDICINE

## 2024-05-30 PROCEDURE — 25810000003 SODIUM CHLORIDE 0.9 % SOLUTION: Performed by: ANESTHESIOLOGY

## 2024-05-30 PROCEDURE — 97535 SELF CARE MNGMENT TRAINING: CPT

## 2024-05-30 PROCEDURE — 99232 SBSQ HOSP IP/OBS MODERATE 35: CPT | Performed by: INTERNAL MEDICINE

## 2024-05-30 PROCEDURE — 80048 BASIC METABOLIC PNL TOTAL CA: CPT | Performed by: FAMILY MEDICINE

## 2024-05-30 PROCEDURE — 43249 ESOPH EGD DILATION <30 MM: CPT | Performed by: INTERNAL MEDICINE

## 2024-05-30 PROCEDURE — 0D738ZZ DILATION OF LOWER ESOPHAGUS, VIA NATURAL OR ARTIFICIAL OPENING ENDOSCOPIC: ICD-10-PCS | Performed by: INTERNAL MEDICINE

## 2024-05-30 PROCEDURE — 97530 THERAPEUTIC ACTIVITIES: CPT

## 2024-05-30 PROCEDURE — 85027 COMPLETE CBC AUTOMATED: CPT | Performed by: FAMILY MEDICINE

## 2024-05-30 PROCEDURE — 63710000001 PREDNISONE PER 5 MG: Performed by: FAMILY MEDICINE

## 2024-05-30 PROCEDURE — 0DB38ZX EXCISION OF LOWER ESOPHAGUS, VIA NATURAL OR ARTIFICIAL OPENING ENDOSCOPIC, DIAGNOSTIC: ICD-10-PCS | Performed by: INTERNAL MEDICINE

## 2024-05-30 PROCEDURE — 97110 THERAPEUTIC EXERCISES: CPT

## 2024-05-30 RX ORDER — SODIUM CHLORIDE 0.9 % (FLUSH) 0.9 %
10 SYRINGE (ML) INJECTION AS NEEDED
Status: DISCONTINUED | OUTPATIENT
Start: 2024-05-30 | End: 2024-05-30 | Stop reason: HOSPADM

## 2024-05-30 RX ORDER — PROPOFOL 10 MG/ML
VIAL (ML) INTRAVENOUS AS NEEDED
Status: DISCONTINUED | OUTPATIENT
Start: 2024-05-30 | End: 2024-05-30 | Stop reason: SURG

## 2024-05-30 RX ORDER — SODIUM CHLORIDE 9 MG/ML
40 INJECTION, SOLUTION INTRAVENOUS AS NEEDED
Status: DISCONTINUED | OUTPATIENT
Start: 2024-05-30 | End: 2024-05-30 | Stop reason: HOSPADM

## 2024-05-30 RX ORDER — LIDOCAINE HYDROCHLORIDE 20 MG/ML
INJECTION, SOLUTION EPIDURAL; INFILTRATION; INTRACAUDAL; PERINEURAL AS NEEDED
Status: DISCONTINUED | OUTPATIENT
Start: 2024-05-30 | End: 2024-05-30 | Stop reason: SURG

## 2024-05-30 RX ORDER — SODIUM CHLORIDE 9 MG/ML
100 INJECTION, SOLUTION INTRAVENOUS CONTINUOUS
Status: DISCONTINUED | OUTPATIENT
Start: 2024-05-30 | End: 2024-05-31

## 2024-05-30 RX ORDER — SODIUM CHLORIDE 0.9 % (FLUSH) 0.9 %
10 SYRINGE (ML) INJECTION EVERY 12 HOURS SCHEDULED
Status: DISCONTINUED | OUTPATIENT
Start: 2024-05-30 | End: 2024-05-30 | Stop reason: HOSPADM

## 2024-05-30 RX ADMIN — ACETAMINOPHEN 650 MG: 325 TABLET, FILM COATED ORAL at 18:13

## 2024-05-30 RX ADMIN — FERROUS SULFATE TAB 325 MG (65 MG ELEMENTAL FE) 325 MG: 325 (65 FE) TAB at 15:52

## 2024-05-30 RX ADMIN — DIPHENHYDRAMINE HYDROCHLORIDE AND LIDOCAINE HYDROCHLORIDE AND ALUMINUM HYDROXIDE AND MAGNESIUM HYDRO 5 ML: KIT at 20:34

## 2024-05-30 RX ADMIN — LOSARTAN POTASSIUM 50 MG: 50 TABLET, FILM COATED ORAL at 15:52

## 2024-05-30 RX ADMIN — CEFTRIAXONE SODIUM 2000 MG: 2 INJECTION, POWDER, FOR SOLUTION INTRAMUSCULAR; INTRAVENOUS at 08:13

## 2024-05-30 RX ADMIN — FOLIC ACID 1000 MCG: 1 TABLET ORAL at 15:52

## 2024-05-30 RX ADMIN — TRAMADOL HYDROCHLORIDE 50 MG: 50 TABLET ORAL at 20:34

## 2024-05-30 RX ADMIN — LEFLUNOMIDE 20 MG: 20 TABLET ORAL at 15:52

## 2024-05-30 RX ADMIN — ISOSORBIDE MONONITRATE 60 MG: 60 TABLET, EXTENDED RELEASE ORAL at 15:52

## 2024-05-30 RX ADMIN — PROPOFOL 600 MG: 10 INJECTION, EMULSION INTRAVENOUS at 12:46

## 2024-05-30 RX ADMIN — AMLODIPINE BESYLATE 5 MG: 5 TABLET ORAL at 15:52

## 2024-05-30 RX ADMIN — LIDOCAINE HYDROCHLORIDE 50 MG: 20 INJECTION, SOLUTION EPIDURAL; INFILTRATION; INTRACAUDAL; PERINEURAL at 12:46

## 2024-05-30 RX ADMIN — DULOXETINE HYDROCHLORIDE 30 MG: 30 CAPSULE, DELAYED RELEASE ORAL at 15:52

## 2024-05-30 RX ADMIN — SODIUM CHLORIDE 100 ML/HR: 9 INJECTION, SOLUTION INTRAVENOUS at 11:46

## 2024-05-30 RX ADMIN — CARVEDILOL 25 MG: 25 TABLET, FILM COATED ORAL at 15:52

## 2024-05-30 RX ADMIN — PREDNISONE 5 MG: 5 TABLET ORAL at 15:52

## 2024-05-30 RX ADMIN — OXYCODONE HYDROCHLORIDE AND ACETAMINOPHEN 250 MG: 500 TABLET ORAL at 15:52

## 2024-05-30 RX ADMIN — Medication 10 ML: at 20:34

## 2024-05-30 RX ADMIN — FAMOTIDINE 20 MG: 20 TABLET, FILM COATED ORAL at 16:10

## 2024-05-30 RX ADMIN — Medication 1 TABLET: at 15:52

## 2024-05-30 NOTE — ANESTHESIA POSTPROCEDURE EVALUATION
Patient: Tawny Shin    Procedure Summary       Date: 05/30/24 Room / Location: [facility] ENDOSCOPY 2 / [facility] ENDOSCOPY    Anesthesia Start: 1243 Anesthesia Stop: 1316    Procedure: ESOPHAGOGASTRODUODENOSCOPY WITH ANESTHESIA Diagnosis:       Esophageal dysphagia      Iron deficiency anemia, unspecified iron deficiency anemia type      Abnormal CT of the abdomen      (Esophageal dysphagia [R13.19])      (Iron deficiency anemia, unspecified iron deficiency anemia type [D50.9])      (Abnormal CT of the abdomen [R93.5])    Surgeons: Taiwo Sanchez MD Provider: Moiess Chavarria CRNA    Anesthesia Type: MAC ASA Status: 4 - Emergent            Anesthesia Type: MAC    Vitals  Vitals Value Taken Time   BP     Temp     Pulse 104 05/30/24 1316   Resp     SpO2 87 % 05/30/24 1316   Vitals shown include unfiled device data.        Post Anesthesia Care and Evaluation    Patient location during evaluation: PACU  Patient participation: complete - patient participated  Level of consciousness: awake and awake and alert  Pain score: 0  Pain management: adequate    Airway patency: patent  Anesthetic complications: No anesthetic complications    Cardiovascular status: acceptable and stable  Respiratory status: acceptable and unassisted  Hydration status: acceptable

## 2024-05-30 NOTE — THERAPY TREATMENT NOTE
Patient Name: Tawny Shin  : 1955    MRN: [id number]                              Today's Date: 2024       Admit Date: 2024    Visit Dx:     ICD-10-CM ICD-9-CM   1. Hypervolemia, unspecified hypervolemia type  E87.70 276.69   2. Debility  R53.81 799.3   3. Esophageal dysphagia  R13.19 787.29   4. Impaired mobility [Z74.09]  Z74.09 799.89   5. Iron deficiency anemia, unspecified iron deficiency anemia type  D50.9 280.9   6. Abnormal CT of the abdomen  R93.5 793.6   7. Dysphagia, unspecified type  R13.10 787.20     Patient Active Problem List   Diagnosis    T12 compression fracture, initial encounter    Chronic embolism and thrombosis of unspecified deep veins of left lower extremity    Chronic anticoagulation    Iron deficiency anemia    Osteoporosis    E coli bacteremia    Epidural hematoma    Pleural effusion, left    Functional neurological symptom disorder with weakness or paralysis    Overweight (BMI 25.0-29.9)    Rheumatoid arthritis involving multiple sites    (HFpEF) heart failure with preserved ejection fraction    Venous insufficiency    Coronary artery disease involving native coronary artery of native heart without angina pectoris    Sick sinus syndrome    Essential hypertension    Pressure injury of skin of heel    Chronic pain    Anemia of chronic disease    Cholelithiasis    Pressure injury of skin of buttock    Near functional paraplegia    Skin cancer    Squamous cell carcinoma of back    Hematoma    Right-sided chest wall pain    Hyperlipidemia LDL goal <70    Malodorous urine    Stage 3b chronic kidney disease    Cyst of buttocks    Sepsis due to Escherichia coli without acute organ dysfunction    Generalized weakness    Paralysis    History of urinary retention    Bacteremia due to Enterobacter species    Bacteremia    Hypothyroidism    Fluid overload    Dysphagia    Abnormal CT of the abdomen     Past Medical History:   Diagnosis Date    Age-related osteoporosis with  "current pathological fracture 05/27/2020    Arthritis     Asthma     Bilateral bunions 12/23/2020    Cancer     Cardiac pacemaker syndrome 12/23/2020    Overview:  - heart block - implanted 11/16    Charcot's joint of foot, left 12/23/2020    Chronic deep vein thrombosis (DVT) of right lower extremity 06/23/2021    Chronic pain syndrome 06/22/2021    Chronic sinusitis     COPD (chronic obstructive pulmonary disease)     Coronary artery disease     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    Elevated cholesterol     Eustachian tube dysfunction     Heart disease     Herpes simplex     History of transfusion     Hyperlipidemia     Hypertension     Hypothyroidism 12/23/2020    Intrinsic asthma 12/23/2020    Knee dislocation     Labral tear of right hip joint     Laryngitis sicca     Laryngitis, chronic     Left carotid bruit 03/09/2016    MI (myocardial infarction)     Myalgia due to statin 06/25/2019    Open wound of right hip 09/14/2021    Osteomyelitis of right femur 07/06/2021    Otorrhea     Pacemaker 11/17/2016    Primary osteoarthritis of left knee 12/23/2020    Psoriasis vulgaris 12/23/2020    S/P coronary artery stent placement 03/09/2016    Sensorineural hearing loss     Seropositive rheumatoid arthritis of multiple sites 12/27/2019    Overview:  -myochrysine '93-'96 -methotrexate '96--->11/98;r/s  restarted 2/99--> 8/14 (anemia) -sulfasalazine- not effective -penicillamine 6/98-->10/98; no effect -leflunomide 11/98--> - Humira '13-->didn't take - Enbrel 12/14-->3/15- no effect!   Last Assessment & Plan:  - \"aching all over\" because she had to be off her anti-rheumatic drugs for 2 weeks in preparation for her R knee surgery - he    Sick sinus syndrome 12/27/2019    Sjogren's disease     Spondylolisthesis of lumbar region 01/17/2018    Syncope, recurrent 02/08/2021    Urinary tract infection     UTI (urinary tract infection) 04/19/2024     Past Surgical History:   Procedure Laterality Date    A-V CARDIAC " PACEMAKER INSERTION  2016    ATRIAL CARDIAC PACEMAKER INSERTION      CARDIAC CATHETERIZATION      CATARACT EXTRACTION      CERVICAL CORPECTOMY N/A 3/3/2021    Procedure: CERVICAL 6 CORPECTOMY WITH TITANIUM CAGE WITH NEURO MONITORING;  Surgeon: Bandar Shea MD;  Location:  [facility];  Service: Neurosurgery;  Laterality: N/A;    COLONOSCOPY  11/08/2011    One fold in the ascending colon which showed ulcer otherwise normal exam    COLONOSCOPY  11/12/2004    Normal exam repeat in five years    CORONARY ANGIOPLASTY WITH STENT PLACEMENT      X 2; 2013 & 2014    ENDOSCOPY  07/10/2014    Normal exam    FLAP LEG Right 9/14/2021    Procedure: RIGHT GLUTEAL FASCIOCUTANEOUS ADVANCEMENT FLAP AND RIGHT TENSOR FASCIAL JESSICA FLAP;  Surgeon: Amadeo Turner MD;  Location:  [facility];  Service: Plastics;  Laterality: Right;    HIP ABDUCTION TENOTOMY BILATERAL Right 1/14/2021    Procedure: RIGHT HIP GLUTEUS MEDLUS / MINIMUS REPAIR, POSSIBLE ACHILLES ALLOGRAFT;  Surgeon: Nino Carlson MD;  Location:  [facility];  Service: Orthopedics;  Laterality: Right;    INCISION AND DRAINAGE ABSCESS Right 6/4/2022    Procedure: INCISION AND DRAINAGE ABSCESS right hip;  Surgeon: Magda Salcido MD;  Location:  [facility];  Service: General;  Laterality: Right;    INCISION AND DRAINAGE ABSCESS Right 6/10/2022    Procedure: RIGHT HIP INCISION AND DRAINAGE. MD NEEDS 3L VANC IRRIGATION, CURRETTES, DAICANS, KERLEX ROLLS;  Surgeon: Amadeo Turner MD;  Location:  [facility];  Service: Plastics;  Laterality: Right;    INCISION AND DRAINAGE HIP Right 2/9/2021    Procedure: HIP INCISION AND DRAINAGE;  Surgeon: Nino Carlson MD;  Location:  [facility];  Service: Orthopedics;  Laterality: Right;    INCISION AND DRAINAGE LEG Right 10/24/2021    Procedure: INCISION AND DRAINAGE LOWER EXTREMITY;  Surgeon: Amadeo Turner MD;  Location:  [facility];  Service: Plastics;  Laterality: Right;    INCISION AND DRAINAGE OF WOUND Right 7/8/2021    Procedure:  INCISION AND DRAINAGE WOUND RIGHT HIP;  Surgeon: James Huntley MD;  Location:  [facility];  Service: Orthopedics;  Laterality: Right;    JOINT REPLACEMENT      KYPHOPLASTY WITH BIOPSY Bilateral 10/26/2021    Procedure: THOARCIC 12 KYPHOPLASTY WITH BIOPSY;  Surgeon: Bandar Shea MD;  Location:  [facility];  Service: Neurosurgery;  Laterality: Bilateral;    LEG DEBRIDEMENT Right 9/14/2021    Procedure: DEBRIDEMENT OF RIGHT HIP WOUND, RIGHT GLUTEAL FASCIOCUTANEOUS ADVANCEMENT FLAP AND RIGHT TENSOR FASCIAL JESSICA FLAP;  Surgeon: Amadeo Turner MD;  Location:  [facility];  Service: Plastics;  Laterality: Right;    LUMBAR DISCECTOMY Right 3/23/2021    Procedure: LUMBAR DISCECTOMY MICRO, Lumbar 1/2 right;  Surgeon: Bandar Shea MD;  Location:  [facility];  Service: Neurosurgery;  Laterality: Right;    LUMBAR FUSION N/A 1/19/2018    Procedure: L3-4,L4-5 DECOMPRESSION, POSTERIOR SPINAL FUSION WITH INSTRUMENTATION;  Surgeon: Fortino Oropeza MD;  Location:  [facility];  Service:     LUMBAR LAMINECTOMY WITH FUSION Left 1/17/2018    Procedure: LEFT L3-4 L4-5 LATERAL LUMBAR INTERBODY FUSION;  Surgeon: Fortino Oropeza MD;  Location:  [facility];  Service:     MASS EXCISION Right 4/23/2024    Procedure: RIGHT BUTTOCK MASS EXCISION;  Surgeon: Moises Keyes MD;  Location:  [facility];  Service: General;  Laterality: Right;    MYRINGOTOMY W/ TUBES  09/04/2014    TUBES NO LONGER IN PLACE    OTHER SURGICAL HISTORY      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 so hardware was removed and spacers were placed    REPLACEMENT TOTAL KNEE Right       General Information       Row Name 05/30/24 1113          OT Time and Intention    Document Type therapy note (daily note)  -LS     Mode of Treatment occupational therapy  -LS       Row Name 05/30/24 1113          General Information    Patient Profile Reviewed yes  -LS     Existing Precautions/Restrictions fall  -LS       Row Name 05/30/24 1113          Cognition    Orientation  Status (Cognition) oriented x 4  -       Row Name 05/30/24 1113          Safety Issues, Functional Mobility    Impairments Affecting Function (Mobility) balance;pain;strength;endurance/activity tolerance  -               User Key  (r) = Recorded By, (t) = Taken By, (c) = Cosigned By      Initials Name Provider Type     Bianca Mcgarry OTR/L Occupational Therapist                     Mobility/ADL's       Row Name 05/30/24 [facility]          Bed Mobility    Bed Mobility sit-supine  -     Sit-Supine [place] (Bed Mobility) maximum assist (25% patient effort)  -     Assistive Device (Bed Mobility) bed rails;head of bed elevated  -       Row Name 05/30/24 [facility]          Transfers    Transfers sit-stand transfer;stand-sit transfer;toilet transfer  -       Row Name 05/30/24 [facility]          Sit-Stand Transfer    Sit-Stand [place] (Transfers) moderate assist (50% patient effort);verbal cues  -     Assistive Device (Sit-Stand Transfers) walker, front-wheeled  -       Row Name 05/30/24 [facility]          Stand-Sit Transfer    Stand-Sit [place] (Transfers) minimum assist (75% patient effort);verbal cues  -     Assistive Device (Stand-Sit Transfers) walker, front-wheeled  -       Row Name 05/30/24 [facility]          Toilet Transfer    Type (Toilet Transfer) sit-stand;stand-sit  -     [place] Level (Toilet Transfer) moderate assist (50% patient effort)  -     Assistive Device (Toilet Transfer) walker, front-wheeled;commode, bedside without drop arms  -       Row Name 05/30/24 [facility]          Functional Mobility    Functional Mobility- Ind. Level minimum assist (75% patient effort)  -     Functional Mobility- Device walker, front-wheeled  -       Row Name 05/30/24 [facility]          Activities of Daily Living    BADL Assessment/Intervention toileting  -       Row Name 05/30/24 [facility]          Toileting Assessment/Training    [place] Level (Toileting) toileting skills;adjust/manage clothing;maximum  assist (25% patient effort)  -LS     Position (Toileting) supported sitting;supported standing  -LS               User Key  (r) = Recorded By, (t) = Taken By, (c) = Cosigned By      Initials Name Provider Type    Bianca Saleh OTR/L Occupational Therapist                   Obj/Interventions    No documentation.                  Goals/Plan    No documentation.                  Clinical Impression       Row Name 05/30/24 1113          Pain Assessment    Pretreatment Pain Rating 0/10 - no pain  -LS     Posttreatment Pain Rating 0/10 - no pain  -LS       Row Name 05/30/24 1113          Plan of Care Review    Plan of Care Reviewed With patient  -LS     Progress improving  -LS     Outcome Evaluation OT tx completed. Pt seated in chair upon therapist arrival; A&Ox4; No c/o pain; Caregivers also present. Pt performed sit>stand utilizing rwx with Mod A and verbal cues to push from chair. Pt ambulated approx 5ft from bed>BSC utilizing rwx with Min A. Pt performed sit<>stand transfer from BSC with Mod A and verbal cues. Pt required Max A for clothing management. Transport arrived to take Pt for procedure following toileting task. Pt ambulated short distance to stretcher with Min A and performed sit>supine with Max A. Pt continues to benefit from skilled OT intervention in order to address remaining deficits in fxl mobility, fxl activity tolerance, balance, strength, and use of adaptive techniques/equipment during performance of BADLs. Recommend home with 24/7 assist and HH at discharge.  -       Row Name 05/30/24 1113          Therapy Plan Review/Discharge Plan (OT)    Anticipated Discharge Disposition (OT) home with 24/7 care;home with home health  -       Row Name 05/30/24 1113          Positioning and Restraints    Pre-Treatment Position sitting in chair/recliner  -LS     Post Treatment Position other  transport stretcher  -LS     Other Position with other staff  transport stretcher  -LS               User Key  (r)  = Recorded By, (t) = Taken By, (c) = Cosigned By      Initials Name Provider Type    Bianca Saleh OTR/L Occupational Therapist                   Outcome Measures       Row Name 05/30/24 1113          How much help from another is currently needed...    Putting on and taking off regular lower body clothing? 2  -LS     Bathing (including washing, rinsing, and drying) 2  -LS     Toileting (which includes using toilet bed pan or urinal) 2  -LS     Putting on and taking off regular upper body clothing 3  -LS     Taking care of personal grooming (such as brushing teeth) 3  -LS     Eating meals 4  -LS     AM-PAC 6 Clicks Score (OT) 16  -LS       Row Name 05/30/24 1000 05/30/24 0800       How much help from another person do you currently need...    Turning from your back to your side while in flat bed without using bedrails? 3  -KJ 3  -TB    Moving from lying on back to sitting on the side of a flat bed without bedrails? 3  -KJ 3  -TB    Moving to and from a bed to a chair (including a wheelchair)? 2  -KJ 2  -TB    Standing up from a chair using your arms (e.g., wheelchair, bedside chair)? 2  -KJ 2  -TB    Climbing 3-5 steps with a railing? 1  -KJ 2  -TB    To walk in hospital room? 2  -KJ 2  -TB    AM-PAC 6 Clicks Score (PT) 13  -KJ 14  -TB    Highest Level of Mobility Goal 4 --> Transfer to chair/commode  -KJ 4 --> Transfer to chair/commode  -TB      Row Name 05/30/24 1113 05/30/24 1000       Functional Assessment    Outcome Measure Options AM-PAC 6 Clicks Daily Activity (OT)  -LS AM-PAC 6 Clicks Basic Mobility (PT)  -KJ              User Key  (r) = Recorded By, (t) = Taken By, (c) = Cosigned By      Initials Name Provider Type    Emilee Gloria, PTA Physical Therapist Assistant    Kenna Whitehead LPN Licensed Nurse    Bianca Saleh OTR/L Occupational Therapist                    Occupational Therapy Education       Title: PT OT SLP Therapies (In Progress)       Topic: Occupational Therapy (Done)        Point: ADL training (Done)       Description:   Instruct learner(s) on proper safety adaptation and remediation techniques during self care or transfers.   Instruct in proper use of assistive devices.                  Learning Progress Summary             Patient Acceptance, E, VU,NR by LS at 5/30/2024 1238    Acceptance, E, VU by MM at 5/28/2024 1633    Acceptance, E, VU by AC at 5/26/2024 0855                         Point: Home exercise program (Done)       Description:   Instruct learner(s) on appropriate technique for monitoring, assisting and/or progressing therapeutic exercises/activities.                  Learning Progress Summary             Patient Acceptance, E, VU by AC at 5/26/2024 0855                         Point: Precautions (Done)       Description:   Instruct learner(s) on prescribed precautions during self-care and functional transfers.                  Learning Progress Summary             Patient Acceptance, E, VU,NR by LS at 5/30/2024 1238    Acceptance, E, VU by MM at 5/28/2024 1633    Acceptance, E, VU by AC at 5/26/2024 0855                         Point: Body mechanics (Done)       Description:   Instruct learner(s) on proper positioning and spine alignment during self-care, functional mobility activities and/or exercises.                  Learning Progress Summary             Patient Acceptance, E, VU,NR by LS at 5/30/2024 1238    Acceptance, E, VU by MM at 5/28/2024 1633    Acceptance, E, VU by AC at 5/26/2024 0855                                         User Key       Initials Effective Dates Name Provider Type Discipline     02/03/23 -  Sebastián Howe, OTR/L, CNT Occupational Therapist OT     07/11/23 -  Jair Farrar, OTR/L Occupational Therapist OT     06/20/22 -  Bianca Mcgarry OTR/L Occupational Therapist OT                  OT Recommendation and Plan     Plan of Care Review  Plan of Care Reviewed With: patient  Progress: improving  Outcome Evaluation: OT tx completed.  Pt seated in chair upon therapist arrival; A&Ox4; No c/o pain; Caregivers also present. Pt performed sit>stand utilizing rwx with Mod A and verbal cues to push from chair. Pt ambulated approx 5ft from bed>BSC utilizing rwx with Min A. Pt performed sit<>stand transfer from BSC with Mod A and verbal cues. Pt required Max A for clothing management. Transport arrived to take Pt for procedure following toileting task. Pt ambulated short distance to stretcher with Min A and performed sit>supine with Max A. Pt continues to benefit from skilled OT intervention in order to address remaining deficits in fxl mobility, fxl activity tolerance, balance, strength, and use of adaptive techniques/equipment during performance of BADLs. Recommend home with 24/7 assist and HH at discharge.     Time Calculation:         Time Calculation- OT       Row Name 05/30/24 1113             Time Calculation- OT    OT Start Time 1113  -LS      OT Stop Time 1138  -      OT Time Calculation (min) 25 min  -      Total Timed Code Minutes- OT 25 minute(s)  -      OT Received On 05/30/24  -                User Key  (r) = Recorded By, (t) = Taken By, (c) = Cosigned By      Initials Name Provider Type    LS Bianca Mcgarry OTR/L Occupational Therapist                  Therapy Charges for Today       Code Description Service Date Service Provider Modifiers Qty    01631302348 HC OT SELF CARE/MGMT/TRAIN EA 15 MIN 5/30/2024 Bianca Mcgarry OTR/L GO 2                 Bianca Mcgarry OTR/KARUNA  5/30/2024

## 2024-05-30 NOTE — PLAN OF CARE
Goal Outcome Evaluation:      Patient resting well overnight. CO pain medicated. Kept NPO after 0000 for possible scope today. Purewick in place. Up with walker. Safety maintained.

## 2024-05-30 NOTE — PLAN OF CARE
Goal Outcome Evaluation:  Plan of Care Reviewed With: patient        Progress: improving  Outcome Evaluation: OT tx completed. Pt seated in chair upon therapist arrival; A&Ox4; No c/o pain; Caregivers also present. Pt performed sit>stand utilizing rwx with Mod A and verbal cues to push from chair. Pt ambulated approx 5ft from bed>BSC utilizing rwx with Min A. Pt performed sit<>stand transfer from BSC with Mod A and verbal cues. Pt required Max A for clothing management. Transport arrived to take Pt for procedure following toileting task. Pt ambulated short distance to stretcher with Min A and performed sit>supine with Max A. Pt continues to benefit from skilled OT intervention in order to address remaining deficits in fxl mobility, fxl activity tolerance, balance, strength, and use of adaptive techniques/equipment during performance of BADLs. Recommend home with 24/7 assist and HH at discharge.      Anticipated Discharge Disposition (OT): home with 24/7 care, home with home health

## 2024-05-30 NOTE — PROGRESS NOTES
[facility]  INPATIENT PROGRESS NOTE    Patient Name: Tawny Shin  Date of Admission: 5/24/2024  Today's Date: 05/30/24  Length of Stay: 6  Primary Care Physician: Moises Oliveira MD    Subjective   Chief Complaint: Weakness   HPI   Patient was thoroughly evaluated by cardiologist today and and recommended to undergo with EGD given no excessive cardiac risk.  She underwent the procedure earlier and tolerated it well.    Review of Systems   All pertinent negatives and positives are as above. All other systems have been reviewed and are negative unless otherwise stated.     Objective    Temp:  [97.5 °F (36.4 °C)-98.4 °F (36.9 °C)] 98 °F (36.7 °C)  Heart Rate:  [] 87  Resp:  [16-26] 18  BP: (126-187)/(54-86) 175/75  Physical Exam  Vitals and nursing note reviewed.   Constitutional:       Comments: Chronically ill.   HENT:      Head: Normocephalic.   Eyes:      Conjunctiva/sclera: Conjunctivae normal.      Pupils: Pupils are equal, round, and reactive to light.   Cardiovascular:      Rate and Rhythm: Normal rate and regular rhythm.      Heart sounds: Normal heart sounds.   Pulmonary:      Effort: Pulmonary effort is normal. No respiratory distress.      Breath sounds: Normal breath sounds.   Abdominal:      General: Bowel sounds are normal. There is no distension.      Palpations: Abdomen is soft.      Tenderness: There is no abdominal tenderness.      Comments: Obesity .   Musculoskeletal:         General: No swelling.      Cervical back: Neck supple.      Comments: Deformities of the hand from arthritis.   Skin:     General: Skin is warm and dry.      Capillary Refill: Capillary refill takes 2 to 3 seconds.      Findings: No rash.   Neurological:      Mental Status: She is alert and oriented to person, place, and time.      Motor: Weakness present.      Coordination: Coordination abnormal.      Gait: Gait abnormal.   Psychiatric:         Mood and  Affect: Mood normal.         Behavior: Behavior normal.         Thought Content: Thought content normal.       Results Review:  I have reviewed the labs, radiology results, and diagnostic studies.    Laboratory Data:   Results from last 7 days   Lab Units 05/30/24 0413 05/29/24 0437 05/28/24 0411   WBC 10*3/mm3 6.11 6.89 5.68   HEMOGLOBIN g/dL 8.0* 8.2* 8.0*   HEMATOCRIT % 27.6* 27.7* 26.9*   PLATELETS 10*3/mm3 187 185 177        Results from last 7 days   Lab Units 05/30/24 0413 05/29/24 0437 05/28/24 0411 05/26/24  0419 05/25/24  0537 05/24/24  1051   SODIUM mmol/L 141 142 140   < > 141 136   POTASSIUM mmol/L 4.3 3.7 4.0   < > 3.5 3.6   CHLORIDE mmol/L 107 107 106   < > 103 103   CO2 mmol/L 27.0 26.0 27.0   < > 27.0 23.0   BUN mg/dL 12 12 15   < > 22 15   CREATININE mg/dL 0.86 0.83 0.92   < > 1.28* 1.08*   CALCIUM mg/dL 8.0* 8.1* 8.1*   < > 8.5* 8.6   BILIRUBIN mg/dL  --   --   --   --  0.5 0.7   ALK PHOS U/L  --   --   --   --  62 66   ALT (SGPT) U/L  --   --   --   --  26 30   AST (SGOT) U/L  --   --   --   --  26 39*   GLUCOSE mg/dL 96 95 77   < > 106* 105*    < > = values in this interval not displayed.       Culture Data:   Blood Culture   Date Value Ref Range Status   05/24/2024 No growth at 2 days  Preliminary   05/24/2024 Proteus mirabilis (C)  Final     Urine Culture   Date Value Ref Range Status   05/24/2024 >100,000 CFU/mL Proteus mirabilis (A)  Final       Radiology Data:   Imaging Results (Last 24 Hours)       ** No results found for the last 24 hours. **            I have reviewed the patient's current medications.     Assessment/Plan   Assessment  Active Hospital Problems    Diagnosis     **Fluid overload     Dysphagia     Abnormal CT of the abdomen     Hypothyroidism     Paralysis     History of urinary retention     Cholelithiasis     Essential hypertension     Coronary artery disease involving native coronary artery of native heart without angina pectoris     Sick sinus syndrome     Venous  insufficiency     Overweight (BMI 25.0-29.9)     Rheumatoid arthritis involving multiple sites     (HFpEF) heart failure with preserved ejection fraction     Chronic anticoagulation     Iron deficiency anemia     Osteoporosis     Chronic embolism and thrombosis of unspecified deep veins of left lower extremity     T12 compression fracture, initial encounter        Treatment Plan  Shortness of breath.  Vascular congestion.  Patient received 80 of Lasix in ER. Improved.  Chest x-ray-Low lung volumes. Stable cardiomegaly with similar positioning of the  left subclavian cardiac pacer leads- Vascular crowding versus mild venous congestion,  No consolidation.  CT of the chest-Small bilateral pleural fluid and small layering groundglass opacity  at the fissures- could represent small pulmonary edema/fluid or atelectasis, Gallstone at the proximal gallbladder neck or cystic duct- Partially visualized gallbladder appears distended- not optimally evaluated on this exam, Diffuse esophageal thickening of the mid and distal esophagus- is new compared to 4/22/2022, recommend referral to gastroenterology for consideration of direct visualization.  Patient is on room air.    CAD/hypertension/hyperlipidemia/pacemaker due to history of sick sinus syndrome.  Aspirin. .  BNP 5000 in ER.  Patient received 80 of Lasix in the ER.  Imdur .  Continue Cozaar.  Nitro as needed.  Coreg.  Daily weight.  Strict in and out.    Allergic to statin  History of positive stress test 2/9/24    Abnormal stress echo with echocardiographic evidence for myocardial ischemia located in the lateral wall consistent with a high risk study for myocardial ischemia.    The following left ventricular wall segments are hypokinetic: mid anterolateral, apical lateral and mid inferolateral.    Left ventricular systolic function is normal at rest.  Consult cardiology for recommendations and EGD clearance.    Gallstone proximal gallbladder neck and cystic duct/diffuse  "esophageal thickening of mid and distal esophagus. GI consult input noted  Status post EGD with findings of esophageal stricture partially dilated with improved symptoms.  No bleeding issues noted.  Recommended to start Eliquis on Saturday.  Ultrasound the gallbladder-Gallstone with no sign of cholecystitis.   Dysphagia/CT noted thickened mid/distal esophagus  GI recommendation-no additional workup for cholelithiasis until patients become symptomatic, dysphagia for last couple months-plan for upper endoscopy today deferred due to cardiac risk, outpatient colonoscopy because this past due.      UTI.     Infect disease consult input noted.   Cefepime narrowed to Rocephin  \"reduce recurrent infections by drinking approximately 2 L of water a day, intravaginal estrogen, consideration for methenamine hippurate\"  History of CRE.      Chronic stage IIIb renal failure.  Creatinine increased to 1.52, now 1.14, probably secondary to Lasix one-time dose in ER 80 mg.     Rheumatoid arthritis/Hx Sjogren .  Biotene.  Magic mouthwash. Arava.  Prednisone daily.     History of DVT . Eliquis on hold for EGD.       Depression/anxiety.  Cymbalta.     Hypokalemia.  P.o. potassium.  Magnesium level 1.8     Anemia.  Hemoglobin stable..  No sign of acute bleed.  Folic acid.  Iron sulfate.     Thrombocytopenia.  Platelet counts increasing.     Hypothyroidism . Synthroid.  TSH-normal.     Reflux.  Tums.  Zofran as needed.  Pepcid IV to PO     Constipation.  Magnesium oxide as needed.  Constipation protocol  as needed.  .     Pain.  Voltaren gel as needed.  Lidocaine patch as needed.  Ultram as needed.     Nutrition . Folic acid.  Cardiac diet.  Boost supplement.  Vitamin C.     Deconditioning.  PT and OT consult.  Patient get around with a walker at baseline.     Dr. Shin's mom.     Blood cultures Proteus 1 out of 2 bottles.  Urine culture-Proteus from UTI.      Medical Decision Making  Number and Complexity of problems: Shortness of " breath/CHF exacerbation/gallstones/thickening esophagus/fail to thrive/rheumatoid arthritis/CAD/  Differential Diagnosis: None     Conditions and Status        Condition is unchanged.     MDM Data  External documents reviewed: Previous note .  Cardiac tracing (EKG, telemetry) interpretation: Sinus rhythm .  Radiology interpretation: CT scan/x-ray  Labs reviewed: Laboratory  Any tests that were considered but not ordered: Laboratory in AM     Decision rules/scores evaluated (example [id number], Wells, etc): None     Discussed with: Patient and caregiver.     Care Planning  Shared decision making: Patient and caregiver.  Code status and discussions: Full code.     Disposition  Social Determinants of Health that impact treatment or disposition: From home.  Estimated length of stay is 1 to 4 days.        Electronically signed by Kris Cade MD, 05/30/24, 17:28 CDT.

## 2024-05-30 NOTE — ANESTHESIA PREPROCEDURE EVALUATION
Anesthesia Evaluation     Patient summary reviewed   history of anesthetic complications:  difficult airway  NPO Solid Status: > 8 hours  NPO Liquid Status: > 8 hours           Airway   Mallampati: II  TM distance: >3 FB  Neck ROM: full  No difficulty expected  Dental - normal exam     Pulmonary    (+) COPD, asthma,  Cardiovascular   Exercise tolerance: poor (<4 METS)    (+) pacemaker (Medtronic) pacemaker, hypertension well controlled 2 medications or greater, past MI  >12 months, CAD, cardiac stents (2 stents, most recent 6 years ago) more than 12 months ago , DVT resolved, hyperlipidemia    ROS comment: Medtronic pacemaker 86% sensing    Neuro/Psych  (+) headaches, syncope, numbness, dementia  (-) seizures, TIA, CVA  GI/Hepatic/Renal/Endo    (+) renal disease- CRI, thyroid problem hypothyroidism  (-) liver disease, diabetes    ROS Comment: Admitted with weakness, found to have UTI  Also with buttock nodule    Musculoskeletal     (+) gait problem  Abdominal    Substance History - negative use     OB/GYN          Other   arthritis (rheumatoid arthritis), blood dyscrasia anemia,   history of cancer      Other Comment: Sjogren's                     Anesthesia Plan    ASA 4 - emergent     MAC     (No need to manipulate pacemaker today; reviewed Dr Mccullough's inpatient consult; ok to proceed. )  intravenous induction     Anesthetic plan, risks, benefits, and alternatives have been provided, discussed and informed consent has been obtained with: patient.

## 2024-05-30 NOTE — PLAN OF CARE
Goal Outcome Evaluation:  Plan of Care Reviewed With: patient        Progress: improving  Outcome Evaluation: ENDO TODAY. DILATION AND BIOPSY DONE. GI DIET. PT/OT WORKS WITH PT. ROOM AIR. PURE WIK. NO DISTRESS NOTED.

## 2024-05-30 NOTE — PLAN OF CARE
Goal Outcome Evaluation:  Plan of Care Reviewed With: patient        Progress: improving  Outcome Evaluation: PT tx completed. Pt has no c/o this morning. Bed mobility Beata, static sitting balance independent. Stood x 5 at bedside utilizing r wx Beata. Able to take steps to pivot to chair. Mobilizing improved.

## 2024-05-30 NOTE — PROGRESS NOTES
INFECTIOUS DISEASES PROGRESS NOTE    Patient:  Tawny Shin  YOB: 1955  MRN: [id number]   Admit date: 5/24/2024   Admitting Physician: Kris Cade,*  Primary Care Physician: Moises Oliveira MD      Chief Complaint: None offered    Interval History: Patient offers no complaints.  She knows she is not supposed to eat or drink until she can find if she supposed to have endoscopy.  Her caregiver notes that she is tolerating medication much better with applesauce and is not coughing or choking on her pills    Allergies:   Allergies   Allergen Reactions    Atorvastatin Other (See Comments)     LEG CRAMPS      Amoxicillin Rash    Escitalopram Rash    Nabumetone Rash    Niacin Er Rash    Penicillin G Rash    Penicillins Rash    Simvastatin Rash       Current Scheduled Medications:   amLODIPine, 5 mg, Oral, Q24H  [Held by provider] apixaban, 5 mg, Oral, BID  ascorbic acid, 250 mg, Oral, Daily  aspirin, 81 mg, Oral, Daily  carvedilol, 25 mg, Oral, BID With Meals  cefTRIAXone, 2,000 mg, Intravenous, Q24H  DULoxetine, 30 mg, Oral, Daily  famotidine, 20 mg, Oral, BID AC  ferrous sulfate, 325 mg, Oral, Daily With Breakfast  First Mouthwash (Magic Mouthwash), 5 mL, Swish & Spit, Q6H  folic acid, 1,000 mcg, Oral, Daily  isosorbide mononitrate, 60 mg, Oral, Daily  leflunomide, 20 mg, Oral, Daily  levothyroxine, 75 mcg, Oral, Q AM  Lidocaine, 2 patch, Transdermal, Q24H  losartan, 50 mg, Oral, Daily  multivitamin with minerals, 1 tablet, Oral, Daily  predniSONE, 5 mg, Oral, Daily  sodium chloride, 10 mL, Intravenous, Q12H      Current PRN Medications:    acetaminophen    albuterol    Biotene dry mouth    senna-docusate sodium **AND** polyethylene glycol **AND** bisacodyl **AND** bisacodyl    Diclofenac Sodium    magnesium hydroxide    nitroglycerin    ondansetron    sodium chloride    sodium chloride    sodium chloride    traMADol    valACYclovir            Objective     Vital Signs:  Temp (24hrs),  "Av.8 °F (36.6 °C), Min:97.5 °F (36.4 °C), Max:98.4 °F (36.9 °C)      /76 (BP Location: Right arm, Patient Position: Lying)   Pulse 72   Temp 97.5 °F (36.4 °C) (Oral)   Resp 16   Ht 167.6 cm (66\")   Wt 99.3 kg (219 lb)   LMP  (LMP Unknown)   SpO2 93%   BMI 35.35 kg/m²         Physical Exam:    General: Patient was awakened from sleep and was in no acute distress  HEENT: Sclera anicteric  Abdomen: Soft, nontender  Psych: Pleasant cooperative    Results Review:    I reviewed the patient's new clinical results.    Lab Results:    CBC:   Lab Results   Lab 24  10524   WBC 6.65 8.09 6.77 5.70 5.68 6.89 6.11   HEMOGLOBIN 8.2* 7.5* 7.7* 7.5* 8.0* 8.2* 8.0*   HEMATOCRIT 26.7* 24.6* 25.1* 25.1* 26.9* 27.7* 27.6*   PLATELETS 115* 114* 127* 131* 177 185 187        AutoDiff:   Lab Results   Lab 24  10524  0537   NEUTROPHIL % 66.3 67.5   LYMPHOCYTE % 20.3 17.8*   MONOCYTES % 11.3 12.7*   EOSINOPHIL % 0.2* 0.1*   BASOPHIL % 0.5 0.4   NEUTROS ABS 4.42 5.46   LYMPHS ABS 1.35 1.44   MONOS ABS 0.75 1.03*   EOS ABS 0.01 0.01   BASOS ABS 0.03 0.03        Manual Diff:    Lab Results   Lab 24  10524  0537   NEUTROS ABS 4.42 5.46           CMP:   Lab Results   Lab 24  1051 2437 24   SODIUM 136 141   < > 140 142 141   POTASSIUM 3.6 3.5   < > 4.0 3.7 4.3   CHLORIDE 103 103   < > 106 107 107   CO2 23.0 27.0   < > 27.0 26.0 27.0   BUN 15 22   < > 15 12 12   CREATININE 1.08* 1.28*   < > 0.92 0.83 0.86   CALCIUM 8.6 8.5*   < > 8.1* 8.1* 8.0*   BILIRUBIN 0.7 0.5  --   --   --   --    ALK PHOS 66 62  --   --   --   --    ALT (SGPT) 30 26  --   --   --   --    AST (SGOT) 39* 26  --   --   --   --    GLUCOSE 105* 106*   < > 77 95 96    < > = values in this interval not displayed.       Estimated Creatinine Clearance: 72.4 mL/min (by C-G " formula based on SCr of 0.86 mg/dL).    Culture Results:    Microbiology Results (last 10 days)       Procedure Component Value - Date/Time    Urine Culture - Urine, Urine, Clean Catch [[id number]]  (Abnormal)  (Susceptibility) Collected: 05/24/24 1312    Lab Status: Final result Specimen: Urine, Clean Catch Updated: 05/26/24 1110     Urine Culture >100,000 CFU/mL Proteus mirabilis    Narrative:      Colonization of the urinary tract without infection is common. Treatment is discouraged unless the patient is symptomatic, pregnant, or undergoing an invasive urologic procedure.    Susceptibility        Proteus mirabilis      LICHA      Amoxicillin + Clavulanate Susceptible      Ampicillin Susceptible      Ampicillin + Sulbactam Susceptible      Cefazolin Susceptible      Cefepime Susceptible      Ceftazidime Susceptible      Ceftriaxone Susceptible      Gentamicin Susceptible      Levofloxacin Susceptible      Nitrofurantoin Resistant      Piperacillin + Tazobactam Susceptible      Trimethoprim + Sulfamethoxazole Susceptible                           Blood Culture - Blood, Arm, Right [[id number]]  (Normal) Collected: 05/24/24 1110    Lab Status: Final result Specimen: Blood from Arm, Right Updated: 05/29/24 1145     Blood Culture No growth at 5 days    COVID-19 and FLU A/B PCR, 1 HR TAT - Swab, Nasopharynx [[id number]]  (Normal) Collected: 05/24/24 1106    Lab Status: Final result Specimen: Swab from Nasopharynx Updated: 05/24/24 1136     COVID19 Not Detected     Influenza A PCR Not Detected     Influenza B PCR Not Detected    Narrative:      Fact sheet for providers: https://www.fda.gov/media/537590/download    Fact sheet for patients: https://www.fda.gov/media/818287/download    Test performed by PCR.    Blood Culture - Blood, Hand, Digit Right [[id number]]  (Abnormal)  (Susceptibility) Collected: 05/24/24 1051    Lab Status: Final result Specimen: Blood from Hand, Digit Right Updated: 05/27/24 0519     Blood Culture  Proteus mirabilis     Isolated from Aerobic Bottle     Gram Stain Aerobic Bottle Gram negative bacilli    Susceptibility        Proteus mirabilis      LICHA      Amoxicillin + Clavulanate Susceptible      Ampicillin Susceptible      Ampicillin + Sulbactam Susceptible      Cefepime Susceptible      Ceftazidime Susceptible      Ceftriaxone Susceptible      Gentamicin Susceptible      Levofloxacin Susceptible      Piperacillin + Tazobactam Susceptible      Trimethoprim + Sulfamethoxazole Susceptible                       Susceptibility Comments       Proteus mirabilis    Cefazolin sensitivity will not be reported for Enterobacteriaceae in non-urine isolates. If cefazolin is preferred, please call the microbiology lab to request an E-test.  With the exception of urinary-sourced infections, aminoglycosides should not be used as monotherapy.               Blood Culture ID, PCR - Blood, Hand, Digit Right [[id number]]  (Abnormal) Collected: 05/24/24 1051    Lab Status: Final result Specimen: Blood from Hand, Digit Right Updated: 05/25/24 0648     BCID, PCR Proteus spp. Identification by BCID2 PCR.     BOTTLE TYPE Aerobic Bottle    Narrative:      No resistance genes detected.                 Radiology:   Imaging Results (Last 72 Hours)       ** No results found for the last 72 hours. **                Active Hospital Problems    Diagnosis     **Fluid overload     Dysphagia     Abnormal CT of the abdomen     Hypothyroidism     Paralysis     History of urinary retention     Cholelithiasis     Essential hypertension     Coronary artery disease involving native coronary artery of native heart without angina pectoris     Sick sinus syndrome     Venous insufficiency     Overweight (BMI 25.0-29.9)     Rheumatoid arthritis involving multiple sites     (HFpEF) heart failure with preserved ejection fraction     Chronic anticoagulation     Iron deficiency anemia     Osteoporosis     Chronic embolism and thrombosis of unspecified deep  veins of left lower extremity     T12 compression fracture, initial encounter        IMPRESSION:  Proteus bacteremia secondary to Proteus UTI   History of recurrent urinary tract infections.-More recently carbapenem resistant Enterobacter and prior to that E. coli.  Recent HX CRE bacteremia  Remote HX of MSSA bacteremia  Immunocompromised patient with RA on leflunamide/prednisone  Dysphagia for last few months  Anemia-chronic        RECOMMENDATION:   Completed 7 days antibiotic therapy with ceftriaxone.  Considered additional oral therapy but elected not to based on bacteremia with known source of urinary tract infection otherwise not complicated.  Continue to recommend the following to reduce recurrent infections #1 drinking approximately 2 L of water a day, #2 intravaginal estrogen, #3 methenamine hippurate.  The latter 2 to be prescribed upon discharge if no drug interactions for the methenamine hippurate.  (Not able to prescribe inpatient)  Contact isolation given hx of MDRO  Await decision regarding EGD today        Maggie Bang MD  05/30/24  07:47 CDT

## 2024-05-30 NOTE — OP NOTE
ESOPHAGOGASTRODUODENOSCOPY    Date:  5/30/2024    Indications: Dysphagia; abnormal CT scan       Procedure: ESOPHAGOGASTRODUODENOSCOPY     Sedation: As per anesthesia.    Surgeon: Taiwo Sanchez MD    Anesthesia: Monitored Anesthesia Care     Procedure  Description: After informed consent was obtained, a timeout was called in the endoscopy suite to confirm the correct patient and appropriate procedure.  Thereafter with the patient in the left lateral position, esophagus was intubated under direct vision with adult Olympus gastroscope.  Thereafter scope was slowly advanced into the second portion of the duodenum under direct vision.  Careful examination of the duodenum, stomach and esophagus was performed while slowly withdrawing the scope.  Retroflex examination of the gastric cardia and fundus were also performed.    Findings:   Esophagus: Distal esophagus with multilevel benign-appearing circumferential narrowing occupying extending one fourth of the esophagus to the GE junction consistent with a partially obstructing benign esophageal stricture.  No ulcerations suggestive of active ulcerative esophagitis.  Wire-guided TTS/CRE balloon dilation at 12 mm without effect increasing incrementally to 16.5 and 18 mm noting several areas of mucosal tearing circumferentially in the distal third of the esophagus in addition to areas of stretched mucosa.  Additional disruptive dilation biopsies performed at the GE junction over a normal Z-line in addition to distal esophagus.  Hemostasis observed.    Stomach: Scattered erythema without any concerning ulcer/masses.    Duodenum: Normal first and second portion mucosa.  No biopsies obtained.    Complications: No immediate complications.    Recommendations:   -Follow-up biopsies  -GI soft diet  -Pantoprazole 40 mg p.o. daily 30 minutes before breakfast  -Follow-up in the GI office in 2 to 4 weeks for reevaluation.  Consider repeat EGD with savory dilation under fluoroscopic  guidance with goal of maximum dilation of 20 mm.  -Ideally restart Eliquis on Saturday a.m. if possible.  If absolutely needs to be restarted sooner that would be okay as well.  Will watch for signs of bleeding.    Will sign off for now.  Do not hesitate to call with questions or concerns.      Post-Op Diagnosis Codes:     * Esophageal dysphagia [R13.19]     * Iron deficiency anemia, unspecified iron deficiency anemia type [D50.9]     * Abnormal CT of the abdomen [R93.5]    Taiwo Sanchez MD    DISCLAIMER:    This physician works through a [employer] as an inpatient consultant gastroenterologist only and has no outpatient clinic for patient follow up.  Any results not available at time of inpatient discharge and/or GI clinic follow up should be managed by the hospitalist team, PCP, or outpatient gastroenterologist.

## 2024-05-30 NOTE — THERAPY TREATMENT NOTE
Acute Care - Physical Therapy Treatment Note  [facility]     Patient Name: Tawny Shin  : 1955  MRN: [id number]  Today's Date: 2024   Onset of Illness/Injury or Date of Surgery: 24  Visit Dx:     ICD-10-CM ICD-9-CM   1. Hypervolemia, unspecified hypervolemia type  E87.70 276.69   2. Debility  R53.81 799.3   3. Esophageal dysphagia  R13.19 787.29   4. Impaired mobility [Z74.09]  Z74.09 799.89   5. Iron deficiency anemia, unspecified iron deficiency anemia type  D50.9 280.9   6. Abnormal CT of the abdomen  R93.5 793.6   7. Dysphagia, unspecified type  R13.10 787.20     Patient Active Problem List   Diagnosis    T12 compression fracture, initial encounter    Chronic embolism and thrombosis of unspecified deep veins of left lower extremity    Chronic anticoagulation    Iron deficiency anemia    Osteoporosis    E coli bacteremia    Epidural hematoma    Pleural effusion, left    Functional neurological symptom disorder with weakness or paralysis    Overweight (BMI 25.0-29.9)    Rheumatoid arthritis involving multiple sites    (HFpEF) heart failure with preserved ejection fraction    Venous insufficiency    Coronary artery disease involving native coronary artery of native heart without angina pectoris    Sick sinus syndrome    Essential hypertension    Pressure injury of skin of heel    Chronic pain    Anemia of chronic disease    Cholelithiasis    Pressure injury of skin of buttock    Near functional paraplegia    Skin cancer    Squamous cell carcinoma of back    Hematoma    Right-sided chest wall pain    Hyperlipidemia LDL goal <70    Malodorous urine    Stage 3b chronic kidney disease    Cyst of buttocks    Sepsis due to Escherichia coli without acute organ dysfunction    Generalized weakness    Paralysis    History of urinary retention    Bacteremia due to Enterobacter species    Bacteremia    Hypothyroidism    Fluid overload    Dysphagia    Abnormal CT of the abdomen     Past Medical History:  "  Diagnosis Date    Age-related osteoporosis with current pathological fracture 05/27/2020    Arthritis     Asthma     Bilateral bunions 12/23/2020    Cancer     Cardiac pacemaker syndrome 12/23/2020    Overview:  - heart block - implanted 11/16    Charcot's joint of foot, left 12/23/2020    Chronic deep vein thrombosis (DVT) of right lower extremity 06/23/2021    Chronic pain syndrome 06/22/2021    Chronic sinusitis     COPD (chronic obstructive pulmonary disease)     Coronary artery disease     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    Elevated cholesterol     Eustachian tube dysfunction     Heart disease     Herpes simplex     History of transfusion     Hyperlipidemia     Hypertension     Hypothyroidism 12/23/2020    Intrinsic asthma 12/23/2020    Knee dislocation     Labral tear of right hip joint     Laryngitis sicca     Laryngitis, chronic     Left carotid bruit 03/09/2016    MI (myocardial infarction)     Myalgia due to statin 06/25/2019    Open wound of right hip 09/14/2021    Osteomyelitis of right femur 07/06/2021    Otorrhea     Pacemaker 11/17/2016    Primary osteoarthritis of left knee 12/23/2020    Psoriasis vulgaris 12/23/2020    S/P coronary artery stent placement 03/09/2016    Sensorineural hearing loss     Seropositive rheumatoid arthritis of multiple sites 12/27/2019    Overview:  -myochrysine '93-'96 -methotrexate '96--->11/98;r/s  restarted 2/99--> 8/14 (anemia) -sulfasalazine- not effective -penicillamine 6/98-->10/98; no effect -leflunomide 11/98--> - Humira '13-->didn't take - Enbrel 12/14-->3/15- no effect!   Last Assessment & Plan:  - \"aching all over\" because she had to be off her anti-rheumatic drugs for 2 weeks in preparation for her R knee surgery - he    Sick sinus syndrome 12/27/2019    Sjogren's disease     Spondylolisthesis of lumbar region 01/17/2018    Syncope, recurrent 02/08/2021    Urinary tract infection     UTI (urinary tract infection) 04/19/2024     Past Surgical " History:   Procedure Laterality Date    A-V CARDIAC PACEMAKER INSERTION  2016    ATRIAL CARDIAC PACEMAKER INSERTION      CARDIAC CATHETERIZATION      CATARACT EXTRACTION      CERVICAL CORPECTOMY N/A 3/3/2021    Procedure: CERVICAL 6 CORPECTOMY WITH TITANIUM CAGE WITH NEURO MONITORING;  Surgeon: Bandar Shea MD;  Location:  [facility];  Service: Neurosurgery;  Laterality: N/A;    COLONOSCOPY  11/08/2011    One fold in the ascending colon which showed ulcer otherwise normal exam    COLONOSCOPY  11/12/2004    Normal exam repeat in five years    CORONARY ANGIOPLASTY WITH STENT PLACEMENT      X 2; 2013 & 2014    ENDOSCOPY  07/10/2014    Normal exam    FLAP LEG Right 9/14/2021    Procedure: RIGHT GLUTEAL FASCIOCUTANEOUS ADVANCEMENT FLAP AND RIGHT TENSOR FASCIAL JESSICA FLAP;  Surgeon: Amadeo Turner MD;  Location:  [facility];  Service: Plastics;  Laterality: Right;    HIP ABDUCTION TENOTOMY BILATERAL Right 1/14/2021    Procedure: RIGHT HIP GLUTEUS MEDLUS / MINIMUS REPAIR, POSSIBLE ACHILLES ALLOGRAFT;  Surgeon: Nino Carlson MD;  Location:  [facility];  Service: Orthopedics;  Laterality: Right;    INCISION AND DRAINAGE ABSCESS Right 6/4/2022    Procedure: INCISION AND DRAINAGE ABSCESS right hip;  Surgeon: Magda Salcido MD;  Location:  [facility];  Service: General;  Laterality: Right;    INCISION AND DRAINAGE ABSCESS Right 6/10/2022    Procedure: RIGHT HIP INCISION AND DRAINAGE. MD NEEDS 3L VANC IRRIGATION, CURRETTES, DAICANS, KERLEX ROLLS;  Surgeon: Amadeo Turner MD;  Location:  [facility];  Service: Plastics;  Laterality: Right;    INCISION AND DRAINAGE HIP Right 2/9/2021    Procedure: HIP INCISION AND DRAINAGE;  Surgeon: Nino Carlson MD;  Location:  [facility];  Service: Orthopedics;  Laterality: Right;    INCISION AND DRAINAGE LEG Right 10/24/2021    Procedure: INCISION AND DRAINAGE LOWER EXTREMITY;  Surgeon: Amadeo Turner MD;  Location:  [facility];  Service: Plastics;  Laterality: Right;    INCISION AND  DRAINAGE OF WOUND Right 7/8/2021    Procedure: INCISION AND DRAINAGE WOUND RIGHT HIP;  Surgeon: James Huntley MD;  Location:  [facility];  Service: Orthopedics;  Laterality: Right;    JOINT REPLACEMENT      KYPHOPLASTY WITH BIOPSY Bilateral 10/26/2021    Procedure: THOARCIC 12 KYPHOPLASTY WITH BIOPSY;  Surgeon: Bandar Shea MD;  Location:  [facility];  Service: Neurosurgery;  Laterality: Bilateral;    LEG DEBRIDEMENT Right 9/14/2021    Procedure: DEBRIDEMENT OF RIGHT HIP WOUND, RIGHT GLUTEAL FASCIOCUTANEOUS ADVANCEMENT FLAP AND RIGHT TENSOR FASCIAL JESSICA FLAP;  Surgeon: Amadeo Turner MD;  Location:  [facility];  Service: Plastics;  Laterality: Right;    LUMBAR DISCECTOMY Right 3/23/2021    Procedure: LUMBAR DISCECTOMY MICRO, Lumbar 1/2 right;  Surgeon: Bandar Shea MD;  Location:  [facility];  Service: Neurosurgery;  Laterality: Right;    LUMBAR FUSION N/A 1/19/2018    Procedure: L3-4,L4-5 DECOMPRESSION, POSTERIOR SPINAL FUSION WITH INSTRUMENTATION;  Surgeon: Fortino Oropeza MD;  Location:  [facility];  Service:     LUMBAR LAMINECTOMY WITH FUSION Left 1/17/2018    Procedure: LEFT L3-4 L4-5 LATERAL LUMBAR INTERBODY FUSION;  Surgeon: Fortino Oropeza MD;  Location:  [facility];  Service:     MASS EXCISION Right 4/23/2024    Procedure: RIGHT BUTTOCK MASS EXCISION;  Surgeon: Moises Keyes MD;  Location:  [facility];  Service: General;  Laterality: Right;    MYRINGOTOMY W/ TUBES  09/04/2014    TUBES NO LONGER IN PLACE    OTHER SURGICAL HISTORY      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 so hardware was removed and spacers were placed    REPLACEMENT TOTAL KNEE Right      PT Assessment (Last 12 Hours)       PT Evaluation and Treatment       Row Name 05/30/24 1014          Physical Therapy Time and Intention    Subjective Information no complaints  -KJ     Document Type therapy note (daily note)  -KJ     Mode of Treatment physical therapy  -KJ     Patient Effort good  -KJ       Row Name 05/30/24 1018           General Information    Existing Precautions/Restrictions fall  -KJ       Row Name 05/30/24 1014          Pain    Pretreatment Pain Rating 0/10 - no pain  -KJ     Posttreatment Pain Rating 0/10 - no pain  -KJ       Row Name 05/30/24 1014          Bed Mobility    Supine-Sit [place] (Bed Mobility) minimum assist (75% patient effort);moderate assist (50% patient effort)  -KJ       Row Name 05/30/24 1014          Sit-Stand Transfer    Sit-Stand [place] (Transfers) minimum assist (75% patient effort);verbal cues  -KJ     Assistive Device (Sit-Stand Transfers) walker, front-wheeled  -KJ       Row Name 05/30/24 1014          Stand-Sit Transfer    Stand-Sit [place] (Transfers) verbal cues;minimum assist (75% patient effort)  -KJ       Row Name 05/30/24 1014          Gait/Stairs (Locomotion)    [place] Level (Gait) verbal cues;minimum assist (75% patient effort)  -KJ     Assistive Device (Gait) walker, front-wheeled  -KJ     Distance in Feet (Gait) --  steps to chair; stood x 4 at bedside  -KJ       Row Name 05/30/24 1014          Aerobic Exercise    Comment, Aerobic Exercise (Therapeutic Exercise) AROM x 20 reps; sitting & supine  -KJ       Row Name             Wound 04/23/24 0936 Right gluteal Incision    Wound - Properties Group Placement Date: 04/23/24  -EP Placement Time: 0936  -EP Present on Original Admission: N  -EP Side: Right  -EP Location: gluteal  -EP Primary Wound Type: Incision  -EP    Retired Wound - Properties Group Placement Date: 04/23/24  -EP Placement Time: 0936  -EP Present on Original Admission: N  -EP Side: Right  -EP Location: gluteal  -EP Primary Wound Type: Incision  -EP    Retired Wound - Properties Group Date first assessed: 04/23/24  -EP Time first assessed: 0936  -EP Present on Original Admission: N  -EP Side: Right  -EP Location: gluteal  -EP Primary Wound Type: Incision  -EP      Row Name 05/30/24 1014          Positioning and Restraints    Pre-Treatment Position in  bed  -KJ     Post Treatment Position chair  -KJ     In Bed call light within reach;with family/caregiver  -KJ               User Key  (r) = Recorded By, (t) = Taken By, (c) = Cosigned By      Initials Name Provider Type    Emilee Gloria, PTA Physical Therapist Assistant    Sonny Thompson, RN Registered Nurse                    Physical Therapy Education       Title: PT OT SLP Therapies (In Progress)       Topic: Physical Therapy (In Progress)       Point: Mobility training (Done)       Learning Progress Summary             Patient Acceptance, E, VU by MS at 5/26/2024 0910    Comment: role of PT in her care                         Point: Home exercise program (Not Started)       Learner Progress:  Not documented in this visit.              Point: Body mechanics (Not Started)       Learner Progress:  Not documented in this visit.              Point: Precautions (Not Started)       Learner Progress:  Not documented in this visit.                              User Key       Initials Effective Dates Name Provider Type Discipline    MS 07/11/23 -  Nicole Olson, PT, DPT, NCS Physical Therapist PT                  PT Recommendation and Plan     Plan of Care Reviewed With: patient  Progress: improving  Outcome Evaluation: PT tx completed. Pt has no c/o this morning. Bed mobility Beata, static sitting balance independent. Stood x 5 at bedside utilizing r wx Beata. Able to take steps to pivot to chair. Mobilizing improved.   Outcome Measures       Row Name 05/30/24 1000 05/29/24 1500 05/28/24 1100       How much help from another person do you currently need...    Turning from your back to your side while in flat bed without using bedrails? 3  -KJ 2  -KJ 3  -KJ    Moving from lying on back to sitting on the side of a flat bed without bedrails? 3  -KJ 2  -KJ 3  -KJ    Moving to and from a bed to a chair (including a wheelchair)? 2  -KJ 2  -KJ 2  -KJ    Standing up from a chair using your arms (e.g., wheelchair, bedside  chair)? 2  -KJ 2  -KJ 2  -KJ    Climbing 3-5 steps with a railing? 1  -KJ 1  -KJ 1  -KJ    To walk in hospital room? 2  -KJ 2  -KJ 2  -KJ    AM-PAC 6 Clicks Score (PT) 13  -KJ 11  -KJ 13  -KJ    Highest Level of Mobility Goal 4 --> Transfer to chair/commode  -KJ 4 --> Transfer to chair/commode  -KJ 4 --> Transfer to chair/commode  -KJ       Functional Assessment    Outcome Measure Options AM-PAC 6 Clicks Basic Mobility (PT)  -KJ AM-PAC 6 Clicks Basic Mobility (PT)  -KJ AM-PAC 6 Clicks Basic Mobility (PT)  -KJ              User Key  (r) = Recorded By, (t) = Taken By, (c) = Cosigned By      Initials Name Provider Type    Emilee Gloria PTA Physical Therapist Assistant                     Time Calculation:    PT Charges       Row Name 05/30/24 1035             Time Calculation    Start Time 1014  -KJ      Stop Time 1037  -KJ      Time Calculation (min) 23 min  -KJ      PT Received On 05/30/24  -KJ      PT Goal Re-Cert Due Date 06/05/24  -KJ         Time Calculation- PT    Total Timed Code Minutes- PT 23 minute(s)  -KJ                User Key  (r) = Recorded By, (t) = Taken By, (c) = Cosigned By      Initials Name Provider Type    Emilee Gloria PTA Physical Therapist Assistant                  Therapy Charges for Today       Code Description Service Date Service Provider Modifiers Qty    66213027067 HC PT THERAPEUTIC ACT EA 15 MIN 5/29/2024 Emilee Paul, PTA GP 2    59871777045 HC PT THER PROC EA 15 MIN 5/29/2024 Emilee Paul, PTA GP 1    16185018489 HC PT THER PROC EA 15 MIN 5/30/2024 Emilee Paul PTA GP 1    67675946705 HC PT THERAPEUTIC ACT EA 15 MIN 5/30/2024 Emilee Paul, PTA GP 1            PT G-Codes  Outcome Measure Options: AM-PAC 6 Clicks Basic Mobility (PT)  AM-PAC 6 Clicks Score (PT): 13  AM-PAC 6 Clicks Score (OT): 15    Emilee Paul PTA  5/30/2024

## 2024-05-31 ENCOUNTER — READMISSION MANAGEMENT (OUTPATIENT)
Dept: CALL CENTER | Facility: HOSPITAL | Age: 69
End: 2024-05-31
Payer: MEDICARE

## 2024-05-31 VITALS
TEMPERATURE: 97.8 F | BODY MASS INDEX: 35.2 KG/M2 | SYSTOLIC BLOOD PRESSURE: 145 MMHG | RESPIRATION RATE: 16 BRPM | OXYGEN SATURATION: 94 % | DIASTOLIC BLOOD PRESSURE: 60 MMHG | HEIGHT: 66 IN | WEIGHT: 219 LBS | HEART RATE: 73 BPM

## 2024-05-31 PROBLEM — R13.10 DYSPHAGIA: Status: RESOLVED | Noted: 2024-05-24 | Resolved: 2024-05-31

## 2024-05-31 PROBLEM — E87.70 FLUID OVERLOAD: Status: RESOLVED | Noted: 2024-05-24 | Resolved: 2024-05-31

## 2024-05-31 PROBLEM — I50.32 CHRONIC HEART FAILURE WITH PRESERVED EJECTION FRACTION: Status: ACTIVE | Noted: 2022-04-23

## 2024-05-31 LAB
ANION GAP SERPL CALCULATED.3IONS-SCNC: 9 MMOL/L (ref 5–15)
BUN SERPL-MCNC: 12 MG/DL (ref 8–23)
BUN/CREAT SERPL: 15.6 (ref 7–25)
CALCIUM SPEC-SCNC: 8.1 MG/DL (ref 8.6–10.5)
CHLORIDE SERPL-SCNC: 108 MMOL/L (ref 98–107)
CO2 SERPL-SCNC: 24 MMOL/L (ref 22–29)
CREAT SERPL-MCNC: 0.77 MG/DL (ref 0.57–1)
DEPRECATED RDW RBC AUTO: 62.5 FL (ref 37–54)
EGFRCR SERPLBLD CKD-EPI 2021: 83.1 ML/MIN/1.73
ERYTHROCYTE [DISTWIDTH] IN BLOOD BY AUTOMATED COUNT: 17.8 % (ref 12.3–15.4)
GLUCOSE SERPL-MCNC: 134 MG/DL (ref 65–99)
HCT VFR BLD AUTO: 27.8 % (ref 34–46.6)
HGB BLD-MCNC: 8.2 G/DL (ref 12–15.9)
MCH RBC QN AUTO: 28.8 PG (ref 26.6–33)
MCHC RBC AUTO-ENTMCNC: 29.5 G/DL (ref 31.5–35.7)
MCV RBC AUTO: 97.5 FL (ref 79–97)
PLATELET # BLD AUTO: 220 10*3/MM3 (ref 140–450)
PMV BLD AUTO: 11.4 FL (ref 6–12)
POTASSIUM SERPL-SCNC: 4.3 MMOL/L (ref 3.5–5.2)
QT INTERVAL: 348 MS
QTC INTERVAL: 432 MS
RBC # BLD AUTO: 2.85 10*6/MM3 (ref 3.77–5.28)
SODIUM SERPL-SCNC: 141 MMOL/L (ref 136–145)
WBC NRBC COR # BLD AUTO: 8.93 10*3/MM3 (ref 3.4–10.8)

## 2024-05-31 PROCEDURE — 85027 COMPLETE CBC AUTOMATED: CPT | Performed by: FAMILY MEDICINE

## 2024-05-31 PROCEDURE — 80048 BASIC METABOLIC PNL TOTAL CA: CPT | Performed by: FAMILY MEDICINE

## 2024-05-31 PROCEDURE — 97535 SELF CARE MNGMENT TRAINING: CPT

## 2024-05-31 PROCEDURE — 99231 SBSQ HOSP IP/OBS SF/LOW 25: CPT | Performed by: INTERNAL MEDICINE

## 2024-05-31 PROCEDURE — 97110 THERAPEUTIC EXERCISES: CPT

## 2024-05-31 PROCEDURE — 63710000001 PREDNISONE PER 5 MG: Performed by: FAMILY MEDICINE

## 2024-05-31 PROCEDURE — 97530 THERAPEUTIC ACTIVITIES: CPT

## 2024-05-31 RX ORDER — PANTOPRAZOLE SODIUM 40 MG/1
40 TABLET, DELAYED RELEASE ORAL DAILY
Qty: 90 TABLET | Refills: 0 | Status: SHIPPED | OUTPATIENT
Start: 2024-05-31 | End: 2024-05-31

## 2024-05-31 RX ORDER — PANTOPRAZOLE SODIUM 40 MG/1
40 TABLET, DELAYED RELEASE ORAL DAILY
Qty: 90 TABLET | Refills: 0 | Status: SHIPPED | OUTPATIENT
Start: 2024-05-31 | End: 2024-08-29

## 2024-05-31 RX ORDER — METHENAMINE HIPPURATE 1000 MG/1
1 TABLET ORAL 2 TIMES DAILY WITH MEALS
Qty: 180 TABLET | Refills: 0 | Status: SHIPPED | OUTPATIENT
Start: 2024-05-31 | End: 2024-05-31

## 2024-05-31 RX ORDER — ESTRADIOL 0.1 MG/G
2 CREAM VAGINAL DAILY
Qty: 42.5 G | Refills: 0 | Status: SHIPPED | OUTPATIENT
Start: 2024-05-31

## 2024-05-31 RX ORDER — ESTRADIOL 0.1 MG/G
2 CREAM VAGINAL DAILY
Qty: 42.5 G | Refills: 0 | Status: SHIPPED | OUTPATIENT
Start: 2024-05-31 | End: 2024-05-31

## 2024-05-31 RX ORDER — METHENAMINE HIPPURATE 1000 MG/1
1 TABLET ORAL 2 TIMES DAILY WITH MEALS
Qty: 180 TABLET | Refills: 0 | Status: SHIPPED | OUTPATIENT
Start: 2024-05-31 | End: 2024-08-29

## 2024-05-31 RX ADMIN — AMLODIPINE BESYLATE 5 MG: 5 TABLET ORAL at 08:33

## 2024-05-31 RX ADMIN — Medication 1 TABLET: at 08:33

## 2024-05-31 RX ADMIN — LOSARTAN POTASSIUM 50 MG: 50 TABLET, FILM COATED ORAL at 08:33

## 2024-05-31 RX ADMIN — FOLIC ACID 1000 MCG: 1 TABLET ORAL at 08:31

## 2024-05-31 RX ADMIN — LEVOTHYROXINE SODIUM 75 MCG: 0.07 TABLET ORAL at 05:57

## 2024-05-31 RX ADMIN — ASPIRIN 81 MG: 81 TABLET, COATED ORAL at 08:31

## 2024-05-31 RX ADMIN — PREDNISONE 5 MG: 5 TABLET ORAL at 08:31

## 2024-05-31 RX ADMIN — DULOXETINE HYDROCHLORIDE 30 MG: 30 CAPSULE, DELAYED RELEASE ORAL at 08:31

## 2024-05-31 RX ADMIN — CARVEDILOL 25 MG: 25 TABLET, FILM COATED ORAL at 08:31

## 2024-05-31 RX ADMIN — LEFLUNOMIDE 20 MG: 20 TABLET ORAL at 08:33

## 2024-05-31 RX ADMIN — FAMOTIDINE 20 MG: 20 TABLET, FILM COATED ORAL at 08:29

## 2024-05-31 RX ADMIN — FERROUS SULFATE TAB 325 MG (65 MG ELEMENTAL FE) 325 MG: 325 (65 FE) TAB at 08:31

## 2024-05-31 RX ADMIN — OXYCODONE HYDROCHLORIDE AND ACETAMINOPHEN 250 MG: 500 TABLET ORAL at 08:32

## 2024-05-31 RX ADMIN — ISOSORBIDE MONONITRATE 60 MG: 60 TABLET, EXTENDED RELEASE ORAL at 08:31

## 2024-05-31 NOTE — CASE MANAGEMENT/SOCIAL WORK
Continued Stay Note  [facility]     Patient Name: Tawny Shin  MRN: [id number]  Today's Date: 5/31/2024    Admit Date: 5/24/2024    Plan: Home Health   Discharge Plan       Row Name 05/31/24 1550       Plan    Plan Home Health    Patient/Family in Agreement with Plan yes    Final Discharge Disposition Code 06 - home with home health care    Final Note Pt was d/c'ed home today. [facility]  is aware.                   Discharge Codes    No documentation.                 Expected Discharge Date and Time       Expected Discharge Date Expected Discharge Time    May 31, 2024               BRIAN Anderson

## 2024-05-31 NOTE — PLAN OF CARE
Goal Outcome Evaluation:  Plan of Care Reviewed With: patient, caregiver        Progress: improving  Outcome Evaluation: OT tx completed. Pt in fowlers upon therapist arrival; A&Ox4; No c/o pain. Pt performed supine>sit utilizing bedrail with HOB elevated with Min A. Pt performed sit<>stand and took a few steps to chair utilizing rwx with Min A and verbal cues for safety awareness and positioning of AD. Pt left seated in chair with breakfast with caregiver present. Cont OT POC to address remaining deficits in fxl mobility, fxl activity tolerance, balance, strength, and use of adaptive techniques/equipment during performance of BADLs. Recommend home with 24/7 assist and HH at discharge.      Anticipated Discharge Disposition (OT): home with 24/7 care, home with home health

## 2024-05-31 NOTE — THERAPY TREATMENT NOTE
Patient Name: Tawny Shin  : 1955    MRN: [id number]                              Today's Date: 2024       Admit Date: 2024    Visit Dx:     ICD-10-CM ICD-9-CM   1. Hypervolemia, unspecified hypervolemia type  E87.70 276.69   2. Debility  R53.81 799.3   3. Esophageal dysphagia  R13.19 787.29   4. Impaired mobility [Z74.09]  Z74.09 799.89   5. Iron deficiency anemia, unspecified iron deficiency anemia type  D50.9 280.9   6. Abnormal CT of the abdomen  R93.5 793.6   7. Dysphagia, unspecified type  R13.10 787.20     Patient Active Problem List   Diagnosis    T12 compression fracture, initial encounter    Chronic embolism and thrombosis of unspecified deep veins of left lower extremity    Chronic anticoagulation    Iron deficiency anemia    Osteoporosis    E coli bacteremia    Epidural hematoma    Pleural effusion, left    Functional neurological symptom disorder with weakness or paralysis    Overweight (BMI 25.0-29.9)    Rheumatoid arthritis involving multiple sites    (HFpEF) heart failure with preserved ejection fraction    Venous insufficiency    Coronary artery disease involving native coronary artery of native heart without angina pectoris    Sick sinus syndrome    Essential hypertension    Pressure injury of skin of heel    Chronic pain    Anemia of chronic disease    Cholelithiasis    Pressure injury of skin of buttock    Near functional paraplegia    Skin cancer    Squamous cell carcinoma of back    Hematoma    Right-sided chest wall pain    Hyperlipidemia LDL goal <70    Malodorous urine    Stage 3b chronic kidney disease    Cyst of buttocks    Sepsis due to Escherichia coli without acute organ dysfunction    Generalized weakness    Paralysis    History of urinary retention    Bacteremia due to Enterobacter species    Bacteremia    Hypothyroidism    Fluid overload    Dysphagia    Abnormal CT of the abdomen     Past Medical History:   Diagnosis Date    Age-related osteoporosis with  "current pathological fracture 05/27/2020    Arthritis     Asthma     Bilateral bunions 12/23/2020    Cancer     Cardiac pacemaker syndrome 12/23/2020    Overview:  - heart block - implanted 11/16    Charcot's joint of foot, left 12/23/2020    Chronic deep vein thrombosis (DVT) of right lower extremity 06/23/2021    Chronic pain syndrome 06/22/2021    Chronic sinusitis     COPD (chronic obstructive pulmonary disease)     Coronary artery disease     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    Elevated cholesterol     Eustachian tube dysfunction     Heart disease     Herpes simplex     History of transfusion     Hyperlipidemia     Hypertension     Hypothyroidism 12/23/2020    Intrinsic asthma 12/23/2020    Knee dislocation     Labral tear of right hip joint     Laryngitis sicca     Laryngitis, chronic     Left carotid bruit 03/09/2016    MI (myocardial infarction)     Myalgia due to statin 06/25/2019    Open wound of right hip 09/14/2021    Osteomyelitis of right femur 07/06/2021    Otorrhea     Pacemaker 11/17/2016    Primary osteoarthritis of left knee 12/23/2020    Psoriasis vulgaris 12/23/2020    S/P coronary artery stent placement 03/09/2016    Sensorineural hearing loss     Seropositive rheumatoid arthritis of multiple sites 12/27/2019    Overview:  -myochrysine '93-'96 -methotrexate '96--->11/98;r/s  restarted 2/99--> 8/14 (anemia) -sulfasalazine- not effective -penicillamine 6/98-->10/98; no effect -leflunomide 11/98--> - Humira '13-->didn't take - Enbrel 12/14-->3/15- no effect!   Last Assessment & Plan:  - \"aching all over\" because she had to be off her anti-rheumatic drugs for 2 weeks in preparation for her R knee surgery - he    Sick sinus syndrome 12/27/2019    Sjogren's disease     Spondylolisthesis of lumbar region 01/17/2018    Syncope, recurrent 02/08/2021    Urinary tract infection     UTI (urinary tract infection) 04/19/2024     Past Surgical History:   Procedure Laterality Date    A-V CARDIAC " PACEMAKER INSERTION  2016    ATRIAL CARDIAC PACEMAKER INSERTION      CARDIAC CATHETERIZATION      CATARACT EXTRACTION      CERVICAL CORPECTOMY N/A 3/3/2021    Procedure: CERVICAL 6 CORPECTOMY WITH TITANIUM CAGE WITH NEURO MONITORING;  Surgeon: Bandar Shea MD;  Location:  [facility];  Service: Neurosurgery;  Laterality: N/A;    COLONOSCOPY  11/08/2011    One fold in the ascending colon which showed ulcer otherwise normal exam    COLONOSCOPY  11/12/2004    Normal exam repeat in five years    CORONARY ANGIOPLASTY WITH STENT PLACEMENT      X 2; 2013 & 2014    ENDOSCOPY  07/10/2014    Normal exam    ENDOSCOPY N/A 5/30/2024    Procedure: ESOPHAGOGASTRODUODENOSCOPY WITH ANESTHESIA;  Surgeon: Taiwo Sanchez MD;  Location: [facility] ENDOSCOPY;  Service: Gastroenterology;  Laterality: N/A;  preop; dysphagia  postop: balloon dilation  PCP Jesus Manuel jeff    FLAP LEG Right 9/14/2021    Procedure: RIGHT GLUTEAL FASCIOCUTANEOUS ADVANCEMENT FLAP AND RIGHT TENSOR FASCIAL JESSICA FLAP;  Surgeon: Amadeo Turner MD;  Location:  [facility];  Service: Plastics;  Laterality: Right;    HIP ABDUCTION TENOTOMY BILATERAL Right 1/14/2021    Procedure: RIGHT HIP GLUTEUS MEDLUS / MINIMUS REPAIR, POSSIBLE ACHILLES ALLOGRAFT;  Surgeon: Nino Carlson MD;  Location:  [facility];  Service: Orthopedics;  Laterality: Right;    INCISION AND DRAINAGE ABSCESS Right 6/4/2022    Procedure: INCISION AND DRAINAGE ABSCESS right hip;  Surgeon: Magda Salcido MD;  Location:  [facility];  Service: General;  Laterality: Right;    INCISION AND DRAINAGE ABSCESS Right 6/10/2022    Procedure: RIGHT HIP INCISION AND DRAINAGE. MD NEEDS 3L VANC IRRIGATION, CURRETTES, DAICANS, KERLEX ROLLS;  Surgeon: Amadeo Turner MD;  Location:  [facility];  Service: Plastics;  Laterality: Right;    INCISION AND DRAINAGE HIP Right 2/9/2021    Procedure: HIP INCISION AND DRAINAGE;  Surgeon: Nino Carlson MD;  Location:  [facility];  Service: Orthopedics;  Laterality: Right;     INCISION AND DRAINAGE LEG Right 10/24/2021    Procedure: INCISION AND DRAINAGE LOWER EXTREMITY;  Surgeon: Amadeo Turner MD;  Location:  [facility];  Service: Plastics;  Laterality: Right;    INCISION AND DRAINAGE OF WOUND Right 7/8/2021    Procedure: INCISION AND DRAINAGE WOUND RIGHT HIP;  Surgeon: James Huntley MD;  Location:  [facility];  Service: Orthopedics;  Laterality: Right;    JOINT REPLACEMENT      KYPHOPLASTY WITH BIOPSY Bilateral 10/26/2021    Procedure: THOARCIC 12 KYPHOPLASTY WITH BIOPSY;  Surgeon: Bandar Shea MD;  Location:  [facility];  Service: Neurosurgery;  Laterality: Bilateral;    LEG DEBRIDEMENT Right 9/14/2021    Procedure: DEBRIDEMENT OF RIGHT HIP WOUND, RIGHT GLUTEAL FASCIOCUTANEOUS ADVANCEMENT FLAP AND RIGHT TENSOR FASCIAL JESSICA FLAP;  Surgeon: Amadeo Turner MD;  Location:  [facility];  Service: Plastics;  Laterality: Right;    LUMBAR DISCECTOMY Right 3/23/2021    Procedure: LUMBAR DISCECTOMY MICRO, Lumbar 1/2 right;  Surgeon: Bandar Shea MD;  Location:  [facility];  Service: Neurosurgery;  Laterality: Right;    LUMBAR FUSION N/A 1/19/2018    Procedure: L3-4,L4-5 DECOMPRESSION, POSTERIOR SPINAL FUSION WITH INSTRUMENTATION;  Surgeon: Fortino Oropeza MD;  Location:  [facility];  Service:     LUMBAR LAMINECTOMY WITH FUSION Left 1/17/2018    Procedure: LEFT L3-4 L4-5 LATERAL LUMBAR INTERBODY FUSION;  Surgeon: Fortino Oropeza MD;  Location:  [facility];  Service:     MASS EXCISION Right 4/23/2024    Procedure: RIGHT BUTTOCK MASS EXCISION;  Surgeon: Moises Keyes MD;  Location:  [facility];  Service: General;  Laterality: Right;    MYRINGOTOMY W/ TUBES  09/04/2014    TUBES NO LONGER IN PLACE    OTHER SURGICAL HISTORY      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 so hardware was removed and spacers were placed    REPLACEMENT TOTAL KNEE Right       General Information       Row Name 05/31/24 0814          OT Time and Intention    Document Type therapy note (daily note)  -KANDY      Mode of Treatment occupational therapy  -       Row Name 05/31/24 0814          General Information    Patient Profile Reviewed yes  -LS     Existing Precautions/Restrictions fall  -LS       Row Name 05/31/24 0814          Cognition    Orientation Status (Cognition) oriented x 4  -LS       Row Name 05/31/24 0814          Safety Issues, Functional Mobility    Impairments Affecting Function (Mobility) balance;pain;strength;endurance/activity tolerance  -LS               User Key  (r) = Recorded By, (t) = Taken By, (c) = Cosigned By      Initials Name Provider Type    Bianca Saleh OTR/L Occupational Therapist                     Mobility/ADL's       Row Name 05/31/24 0814          Bed Mobility    Bed Mobility supine-sit  -     Assistive Device (Bed Mobility) bed rails;head of bed elevated  -       Row Name 05/31/24 0814          Transfers    Transfers sit-stand transfer;bed-chair transfer  -       Row Name 05/31/24 0814          Bed-Chair Transfer    Bed-Chair [place] (Transfers) minimum assist (75% patient effort);verbal cues  -       Row Name 05/31/24 0814          Sit-Stand Transfer    Sit-Stand [place] (Transfers) minimum assist (75% patient effort);verbal cues  -     Assistive Device (Sit-Stand Transfers) walker, front-wheeled  -               User Key  (r) = Recorded By, (t) = Taken By, (c) = Cosigned By      Initials Name Provider Type    Bianca Saleh OTR/L Occupational Therapist                   Obj/Interventions    No documentation.                  Goals/Plan    No documentation.                  Clinical Impression       Row Name 05/31/24 0814          Pain Assessment    Pretreatment Pain Rating 0/10 - no pain  -LS     Posttreatment Pain Rating 0/10 - no pain  -LS       Row Name 05/31/24 0814          Plan of Care Review    Plan of Care Reviewed With patient;caregiver  -LS     Progress improving  -     Outcome Evaluation OT tx completed. Pt in fowlers upon therapist  arrival; A&Ox4; No c/o pain. Pt performed supine>sit utilizing bedrail with HOB elevated with Min A. Pt performed sit<>stand and took a few steps to chair utilizing rwx with Min A and verbal cues for safety awareness and positioning of AD. Pt left seated in chair with breakfast with caregiver present. Cont OT POC to address remaining deficits in fxl mobility, fxl activity tolerance, balance, strength, and use of adaptive techniques/equipment during performance of BADLs. Recommend home with 24/7 assist and HH at discharge.  -       Row Name 05/31/24 0814          Therapy Plan Review/Discharge Plan (OT)    Anticipated Discharge Disposition (OT) home with 24/7 care;home with home health  -       Row Name 05/31/24 0814          Positioning and Restraints    Pre-Treatment Position in bed  -LS     Post Treatment Position chair  -LS     In Chair sitting;call light within reach;encouraged to call for assist;with family/caregiver;notified nsg  -LS               User Key  (r) = Recorded By, (t) = Taken By, (c) = Cosigned By      Initials Name Provider Type    Bianca Saleh OTR/L Occupational Therapist                   Outcome Measures       Row Name 05/31/24 0814          How much help from another is currently needed...    Putting on and taking off regular lower body clothing? 2  -LS     Bathing (including washing, rinsing, and drying) 2  -LS     Toileting (which includes using toilet bed pan or urinal) 2  -LS     Putting on and taking off regular upper body clothing 3  -LS     Taking care of personal grooming (such as brushing teeth) 4  -LS     Eating meals 4  -LS     AM-PAC 6 Clicks Score (OT) 17  -LS       Row Name 05/31/24 0814          Functional Assessment    Outcome Measure Options AM-PAC 6 Clicks Daily Activity (OT)  -LS               User Key  (r) = Recorded By, (t) = Taken By, (c) = Cosigned By      Initials Name Provider Type    Bianca Saleh OTR/L Occupational Therapist                     Occupational Therapy Education       Title: PT OT SLP Therapies (In Progress)       Topic: Occupational Therapy (Done)       Point: ADL training (Done)       Description:   Instruct learner(s) on proper safety adaptation and remediation techniques during self care or transfers.   Instruct in proper use of assistive devices.                  Learning Progress Summary             Patient Acceptance, E, VU by LS at 5/31/2024 0832    Acceptance, E, VU,NR by LS at 5/30/2024 1238    Acceptance, E, VU by MM at 5/28/2024 1633    Acceptance, E, VU by AC at 5/26/2024 0855                         Point: Home exercise program (Done)       Description:   Instruct learner(s) on appropriate technique for monitoring, assisting and/or progressing therapeutic exercises/activities.                  Learning Progress Summary             Patient Acceptance, E, VU by AC at 5/26/2024 0855                         Point: Precautions (Done)       Description:   Instruct learner(s) on prescribed precautions during self-care and functional transfers.                  Learning Progress Summary             Patient Acceptance, E, VU,NR by LS at 5/30/2024 1238    Acceptance, E, VU by MM at 5/28/2024 1633    Acceptance, E, VU by AC at 5/26/2024 0855                         Point: Body mechanics (Done)       Description:   Instruct learner(s) on proper positioning and spine alignment during self-care, functional mobility activities and/or exercises.                  Learning Progress Summary             Patient Acceptance, E, VU by LS at 5/31/2024 0832    Acceptance, E, VU,NR by  at 5/30/2024 1238    Acceptance, E, VU by MM at 5/28/2024 1633    Acceptance, E, VU by AC at 5/26/2024 0855                                         User Key       Initials Effective Dates Name Provider Type Discipline     02/03/23 -  Sebastián Howe, OTR/L, CNT Occupational Therapist OT    MM 07/11/23 -  Jair Farrar, OTR/L Occupational Therapist OT      06/20/22 -  Bianca Mcgarry OTR/L Occupational Therapist OT                  OT Recommendation and Plan     Plan of Care Review  Plan of Care Reviewed With: patient, caregiver  Progress: improving  Outcome Evaluation: OT tx completed. Pt in fowlers upon therapist arrival; A&Ox4; No c/o pain. Pt performed supine>sit utilizing bedrail with HOB elevated with Min A. Pt performed sit<>stand and took a few steps to chair utilizing rwx with Min A and verbal cues for safety awareness and positioning of AD. Pt left seated in chair with breakfast with caregiver present. Cont OT POC to address remaining deficits in fxl mobility, fxl activity tolerance, balance, strength, and use of adaptive techniques/equipment during performance of BADLs. Recommend home with 24/7 assist and HH at discharge.     Time Calculation:         Time Calculation- OT       Row Name 05/31/24 0814             Time Calculation- OT    OT Start Time 0814  -LS      OT Stop Time 0828  -LS      OT Time Calculation (min) 14 min  -LS      Total Timed Code Minutes- OT 14 minute(s)  -LS      OT Received On 05/31/24  -                User Key  (r) = Recorded By, (t) = Taken By, (c) = Cosigned By      Initials Name Provider Type    LS Bianca Mcgarry OTR/L Occupational Therapist                  Therapy Charges for Today       Code Description Service Date Service Provider Modifiers Qty    49891038649 HC OT SELF CARE/MGMT/TRAIN EA 15 MIN 5/30/2024 Bianca Mcgarry OTR/L GO 2    86178092802 HC OT SELF CARE/MGMT/TRAIN EA 15 MIN 5/31/2024 Bianca Mcgarry OTR/L GO 1                 Bianca Mcgarry OTR/KARUNA  5/31/2024

## 2024-05-31 NOTE — OUTREACH NOTE
Prep Survey      Flowsheet Row Responses   [facility] patient discharged from? [place]   Is LACE score < 7 ? No   Eligibility [facility]   Date of Admission 05/24/24   Date of Discharge 05/31/24   Discharge Disposition Home-Health Care Sv   Discharge diagnosis Fluid overload  [Sepsis with Proteus bacteremia secondary to Proteus UTI]   Does the patient have one of the following disease processes/diagnoses(primary or secondary)? CHF   Does the patient have Home health ordered? Yes   What is the Home health agency?  [facility]   Is there a DME ordered? No   Medication alerts for this patient see AVS   Prep survey completed? Yes            Rosalina MCGEE - Registered Nurse

## 2024-05-31 NOTE — PLAN OF CARE
Goal Outcome Evaluation:      No changes. Patient resting well overnight. No CO pain. Up with PT. Meds whole in apple sauce. Safety maintained.

## 2024-05-31 NOTE — THERAPY TREATMENT NOTE
Acute Care - Physical Therapy Treatment Note  [facility]     Patient Name: Tawny Shin  : 1955  MRN: [id number]  Today's Date: 2024   Onset of Illness/Injury or Date of Surgery: 24  Visit Dx:     ICD-10-CM ICD-9-CM   1. Hypervolemia, unspecified hypervolemia type  E87.70 276.69   2. Debility  R53.81 799.3   3. Esophageal dysphagia  R13.19 787.29   4. Impaired mobility [Z74.09]  Z74.09 799.89   5. Iron deficiency anemia, unspecified iron deficiency anemia type  D50.9 280.9   6. Abnormal CT of the abdomen  R93.5 793.6   7. Dysphagia, unspecified type  R13.10 787.20     Patient Active Problem List   Diagnosis    T12 compression fracture, initial encounter    Chronic embolism and thrombosis of unspecified deep veins of left lower extremity    Chronic anticoagulation    Iron deficiency anemia    Osteoporosis    E coli bacteremia    Epidural hematoma    Pleural effusion, left    Functional neurological symptom disorder with weakness or paralysis    Overweight (BMI 25.0-29.9)    Rheumatoid arthritis involving multiple sites    Chronic heart failure with preserved ejection fraction    Venous insufficiency    Coronary artery disease involving native coronary artery of native heart without angina pectoris    Sick sinus syndrome    Essential hypertension    Pressure injury of skin of heel    Chronic pain    Anemia of chronic disease    Cholelithiasis    Pressure injury of skin of buttock    Near functional paraplegia    Skin cancer    Squamous cell carcinoma of back    Hematoma    Right-sided chest wall pain    Hyperlipidemia LDL goal <70    Malodorous urine    Stage 3b chronic kidney disease    Cyst of buttocks    Sepsis due to Escherichia coli without acute organ dysfunction    Generalized weakness    Paralysis    History of urinary retention    Bacteremia due to Enterobacter species    Bacteremia    Hypothyroidism    Abnormal CT of the abdomen     Past Medical History:   Diagnosis Date    Age-related  "osteoporosis with current pathological fracture 05/27/2020    Arthritis     Asthma     Bilateral bunions 12/23/2020    Cancer     Cardiac pacemaker syndrome 12/23/2020    Overview:  - heart block - implanted 11/16    Charcot's joint of foot, left 12/23/2020    Chronic deep vein thrombosis (DVT) of right lower extremity 06/23/2021    Chronic pain syndrome 06/22/2021    Chronic sinusitis     COPD (chronic obstructive pulmonary disease)     Coronary artery disease     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    Elevated cholesterol     Eustachian tube dysfunction     Heart disease     Herpes simplex     History of transfusion     Hyperlipidemia     Hypertension     Hypothyroidism 12/23/2020    Intrinsic asthma 12/23/2020    Knee dislocation     Labral tear of right hip joint     Laryngitis sicca     Laryngitis, chronic     Left carotid bruit 03/09/2016    MI (myocardial infarction)     Myalgia due to statin 06/25/2019    Open wound of right hip 09/14/2021    Osteomyelitis of right femur 07/06/2021    Otorrhea     Pacemaker 11/17/2016    Primary osteoarthritis of left knee 12/23/2020    Psoriasis vulgaris 12/23/2020    S/P coronary artery stent placement 03/09/2016    Sensorineural hearing loss     Seropositive rheumatoid arthritis of multiple sites 12/27/2019    Overview:  -myochrysine '93-'96 -methotrexate '96--->11/98;r/s  restarted 2/99--> 8/14 (anemia) -sulfasalazine- not effective -penicillamine 6/98-->10/98; no effect -leflunomide 11/98--> - Humira '13-->didn't take - Enbrel 12/14-->3/15- no effect!   Last Assessment & Plan:  - \"aching all over\" because she had to be off her anti-rheumatic drugs for 2 weeks in preparation for her R knee surgery - he    Sick sinus syndrome 12/27/2019    Sjogren's disease     Spondylolisthesis of lumbar region 01/17/2018    Syncope, recurrent 02/08/2021    Urinary tract infection     UTI (urinary tract infection) 04/19/2024     Past Surgical History:   Procedure Laterality Date "    A-V CARDIAC PACEMAKER INSERTION  2016    ATRIAL CARDIAC PACEMAKER INSERTION      CARDIAC CATHETERIZATION      CATARACT EXTRACTION      CERVICAL CORPECTOMY N/A 3/3/2021    Procedure: CERVICAL 6 CORPECTOMY WITH TITANIUM CAGE WITH NEURO MONITORING;  Surgeon: Bandar Shea MD;  Location:  [facility] OR;  Service: Neurosurgery;  Laterality: N/A;    COLONOSCOPY  11/08/2011    One fold in the ascending colon which showed ulcer otherwise normal exam    COLONOSCOPY  11/12/2004    Normal exam repeat in five years    CORONARY ANGIOPLASTY WITH STENT PLACEMENT      X 2; 2013 & 2014    ENDOSCOPY  07/10/2014    Normal exam    ENDOSCOPY N/A 5/30/2024    Procedure: ESOPHAGOGASTRODUODENOSCOPY WITH ANESTHESIA;  Surgeon: Taiwo Sanchez MD;  Location: [facility] ENDOSCOPY;  Service: Gastroenterology;  Laterality: N/A;  preop; dysphagia  postop: balloon dilation  PCP Jesus Manuel jeff    FLAP LEG Right 9/14/2021    Procedure: RIGHT GLUTEAL FASCIOCUTANEOUS ADVANCEMENT FLAP AND RIGHT TENSOR FASCIAL JESSICA FLAP;  Surgeon: Amadeo Turner MD;  Location:  [facility];  Service: Plastics;  Laterality: Right;    HIP ABDUCTION TENOTOMY BILATERAL Right 1/14/2021    Procedure: RIGHT HIP GLUTEUS MEDLUS / MINIMUS REPAIR, POSSIBLE ACHILLES ALLOGRAFT;  Surgeon: Nino Carlson MD;  Location:  [facility];  Service: Orthopedics;  Laterality: Right;    INCISION AND DRAINAGE ABSCESS Right 6/4/2022    Procedure: INCISION AND DRAINAGE ABSCESS right hip;  Surgeon: Magda Salcido MD;  Location:  [facility];  Service: General;  Laterality: Right;    INCISION AND DRAINAGE ABSCESS Right 6/10/2022    Procedure: RIGHT HIP INCISION AND DRAINAGE. MD NEEDS 3L VANC IRRIGATION, CURRETTES, DAICANS, KERLEX ROLLS;  Surgeon: Amadeo Turner MD;  Location:  [facility];  Service: Plastics;  Laterality: Right;    INCISION AND DRAINAGE HIP Right 2/9/2021    Procedure: HIP INCISION AND DRAINAGE;  Surgeon: Nino Carlson MD;  Location:  [facility];  Service: Orthopedics;  Laterality:  Right;    INCISION AND DRAINAGE LEG Right 10/24/2021    Procedure: INCISION AND DRAINAGE LOWER EXTREMITY;  Surgeon: Amadeo Turner MD;  Location:  [facility];  Service: Plastics;  Laterality: Right;    INCISION AND DRAINAGE OF WOUND Right 7/8/2021    Procedure: INCISION AND DRAINAGE WOUND RIGHT HIP;  Surgeon: James Huntley MD;  Location:  [facility];  Service: Orthopedics;  Laterality: Right;    JOINT REPLACEMENT      KYPHOPLASTY WITH BIOPSY Bilateral 10/26/2021    Procedure: THOARCIC 12 KYPHOPLASTY WITH BIOPSY;  Surgeon: Bandar Shea MD;  Location:  [facility];  Service: Neurosurgery;  Laterality: Bilateral;    LEG DEBRIDEMENT Right 9/14/2021    Procedure: DEBRIDEMENT OF RIGHT HIP WOUND, RIGHT GLUTEAL FASCIOCUTANEOUS ADVANCEMENT FLAP AND RIGHT TENSOR FASCIAL JESSICA FLAP;  Surgeon: Amadeo Turner MD;  Location:  [facility];  Service: Plastics;  Laterality: Right;    LUMBAR DISCECTOMY Right 3/23/2021    Procedure: LUMBAR DISCECTOMY MICRO, Lumbar 1/2 right;  Surgeon: Bandar Shea MD;  Location:  [facility];  Service: Neurosurgery;  Laterality: Right;    LUMBAR FUSION N/A 1/19/2018    Procedure: L3-4,L4-5 DECOMPRESSION, POSTERIOR SPINAL FUSION WITH INSTRUMENTATION;  Surgeon: Fortino Oropeza MD;  Location:  [facility];  Service:     LUMBAR LAMINECTOMY WITH FUSION Left 1/17/2018    Procedure: LEFT L3-4 L4-5 LATERAL LUMBAR INTERBODY FUSION;  Surgeon: Fortino Oropeza MD;  Location:  [facility];  Service:     MASS EXCISION Right 4/23/2024    Procedure: RIGHT BUTTOCK MASS EXCISION;  Surgeon: Moises Keyes MD;  Location:  [facility];  Service: General;  Laterality: Right;    MYRINGOTOMY W/ TUBES  09/04/2014    TUBES NO LONGER IN PLACE    OTHER SURGICAL HISTORY      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 so hardware was removed and spacers were placed    REPLACEMENT TOTAL KNEE Right      PT Assessment (Last 12 Hours)       PT Evaluation and Treatment       Row Name 05/31/24 1128          Physical Therapy  Time and Intention    Subjective Information no complaints  -KJ     Document Type therapy note (daily note)  -KJ     Mode of Treatment physical therapy  -KJ     Patient Effort good  -KJ       Row Name 05/31/24 1128          General Information    Existing Precautions/Restrictions fall  -KJ       Row Name 05/31/24 1128          Pain    Pretreatment Pain Rating 0/10 - no pain  -KJ     Posttreatment Pain Rating 0/10 - no pain  -KJ       Row Name 05/31/24 1128          Bed Mobility    Sit-Supine [place] (Bed Mobility) verbal cues;minimum assist (75% patient effort)  -KJ       Row Name 05/31/24 1128          Sit-Stand Transfer    Sit-Stand [place] (Transfers) verbal cues;moderate assist (50% patient effort)  -KJ     Assistive Device (Sit-Stand Transfers) walker, front-wheeled  -KJ       Row Name 05/31/24 1128          Stand-Sit Transfer    Stand-Sit [place] (Transfers) verbal cues;moderate assist (50% patient effort)  -KJ       Row Name 05/31/24 1128          Gait/Stairs (Locomotion)    Comment, (Gait/Stairs) pivot back to bed  -KJ       Row Name 05/31/24 1128          Aerobic Exercise    Comment, Aerobic Exercise (Therapeutic Exercise) AROM  -KJ       Row Name             Wound 04/23/24 0936 Right gluteal Incision    Wound - Properties Group Placement Date: 04/23/24  -EP Placement Time: 0936  -EP Present on Original Admission: N  -EP Side: Right  -EP Location: gluteal  -EP Primary Wound Type: Incision  -EP    Retired Wound - Properties Group Placement Date: 04/23/24  -EP Placement Time: 0936  -EP Present on Original Admission: N  -EP Side: Right  -EP Location: gluteal  -EP Primary Wound Type: Incision  -EP    Retired Wound - Properties Group Date first assessed: 04/23/24  -EP Time first assessed: 0936  -EP Present on Original Admission: N  -EP Side: Right  -EP Location: gluteal  -EP Primary Wound Type: Incision  -EP      Row Name 05/31/24 1128          Positioning and Restraints    Pre-Treatment Position  sitting in chair/recliner  -KJ     Post Treatment Position bed  -KJ     In Bed call light within reach  -KJ               User Key  (r) = Recorded By, (t) = Taken By, (c) = Cosigned By      Initials Name Provider Type    Emilee Gloria, PTA Physical Therapist Assistant    Sonny Thompson, RN Registered Nurse                    Physical Therapy Education       Title: PT OT SLP Therapies (In Progress)       Topic: Physical Therapy (In Progress)       Point: Mobility training (Done)       Learning Progress Summary             Patient Acceptance, E, VU by MS at 5/26/2024 0910    Comment: role of PT in her care                         Point: Home exercise program (Not Started)       Learner Progress:  Not documented in this visit.              Point: Body mechanics (Not Started)       Learner Progress:  Not documented in this visit.              Point: Precautions (Not Started)       Learner Progress:  Not documented in this visit.                              User Key       Initials Effective Dates Name Provider Type Discipline    MS 07/11/23 -  Nicole Olson, PT, DPT, NCS Physical Therapist PT                  PT Recommendation and Plan     Plan of Care Reviewed With: patient  Progress: improving  Outcome Evaluation: PT tx completed. Pt has no c/o this morning. Bed mobility Beata, static sitting balance independent. Stood x 5 at bedside utilizing r wx Beata. Able to take steps to pivot to chair. Mobilizing improved.   Outcome Measures       Row Name 05/30/24 1000 05/29/24 1500          How much help from another person do you currently need...    Turning from your back to your side while in flat bed without using bedrails? 3  -KJ 2  -KJ     Moving from lying on back to sitting on the side of a flat bed without bedrails? 3  -KJ 2  -KJ     Moving to and from a bed to a chair (including a wheelchair)? 2  -KJ 2  -KJ     Standing up from a chair using your arms (e.g., wheelchair, bedside chair)? 2  -KJ 2  -KJ      Climbing 3-5 steps with a railing? 1  -KJ 1  -KJ     To walk in hospital room? 2  -KJ 2  -KJ     AM-PAC 6 Clicks Score (PT) 13  -KJ 11  -KJ     Highest Level of Mobility Goal 4 --> Transfer to chair/commode  -KJ 4 --> Transfer to chair/commode  -KJ        Functional Assessment    Outcome Measure Options AM-PAC 6 Clicks Basic Mobility (PT)  -KJ AM-PAC 6 Clicks Basic Mobility (PT)  -KJ               User Key  (r) = Recorded By, (t) = Taken By, (c) = Cosigned By      Initials Name Provider Type    Emilee Gloria PTA Physical Therapist Assistant                     Time Calculation:    PT Charges       Row Name 05/31/24 1317             Time Calculation    Start Time 1128  -KJ      Stop Time 1155  -KJ      Time Calculation (min) 27 min  -KJ      PT Received On 05/31/24  -KJ      PT Goal Re-Cert Due Date 06/05/24  -KJ         Time Calculation- PT    Total Timed Code Minutes- PT 27 minute(s)  -KJ                User Key  (r) = Recorded By, (t) = Taken By, (c) = Cosigned By      Initials Name Provider Type    Emilee Gloria PTA Physical Therapist Assistant                  Therapy Charges for Today       Code Description Service Date Service Provider Modifiers Qty    45379631789 HC PT THER PROC EA 15 MIN 5/30/2024 Emilee Paul, PTA GP 1    80367733885 HC PT THERAPEUTIC ACT EA 15 MIN 5/30/2024 Emilee Paul, PTA GP 1    52582127254 HC PT THER PROC EA 15 MIN 5/31/2024 Emilee Paul, PTA GP 1    84481711274 HC PT THERAPEUTIC ACT EA 15 MIN 5/31/2024 Emilee Paul, PTA GP 1            PT G-Codes  Outcome Measure Options: AM-PAC 6 Clicks Daily Activity (OT)  AM-PAC 6 Clicks Score (PT): 18  AM-PAC 6 Clicks Score (OT): 17    Emilee Paul PTA  5/31/2024

## 2024-05-31 NOTE — PROGRESS NOTES
"INFECTIOUS DISEASES PROGRESS NOTE    Patient:  Tawny Shin  YOB: 1955  MRN: [id number]   Admit date: 2024   Admitting Physician: Kris Cade,*  Primary Care Physician: Moises Oliveira MD      Chief Complaint: \"Can I go home?\"    Interval History: Patient indicated she was waiting on me to see if she could go home.  She has completed her antibiotic therapy.    She asked about her diuretic and said one of the other doctors told her to only take it every 2 to 3 days.    Allergies:   Allergies   Allergen Reactions    Atorvastatin Other (See Comments)     LEG CRAMPS      Amoxicillin Rash    Escitalopram Rash    Nabumetone Rash    Niacin Er Rash    Penicillin G Rash    Penicillins Rash    Simvastatin Rash       Current Scheduled Medications:   amLODIPine, 5 mg, Oral, Q24H  [Held by provider] apixaban, 5 mg, Oral, BID  ascorbic acid, 250 mg, Oral, Daily  aspirin, 81 mg, Oral, Daily  carvedilol, 25 mg, Oral, BID With Meals  DULoxetine, 30 mg, Oral, Daily  famotidine, 20 mg, Oral, BID AC  ferrous sulfate, 325 mg, Oral, Daily With Breakfast  First Mouthwash (Magic Mouthwash), 5 mL, Swish & Spit, Q6H  folic acid, 1,000 mcg, Oral, Daily  isosorbide mononitrate, 60 mg, Oral, Daily  leflunomide, 20 mg, Oral, Daily  levothyroxine, 75 mcg, Oral, Q AM  Lidocaine, 2 patch, Transdermal, Q24H  losartan, 50 mg, Oral, Daily  multivitamin with minerals, 1 tablet, Oral, Daily  predniSONE, 5 mg, Oral, Daily  sodium chloride, 10 mL, Intravenous, Q12H      Current PRN Medications:    acetaminophen    albuterol    Biotene dry mouth    senna-docusate sodium **AND** polyethylene glycol **AND** bisacodyl **AND** bisacodyl    Diclofenac Sodium    magnesium hydroxide    nitroglycerin    ondansetron    sodium chloride    sodium chloride    sodium chloride    traMADol            Objective     Vital Signs:  Temp (24hrs), Av.8 °F (36.6 °C), Min:97.5 °F (36.4 °C), Max:98.2 °F (36.8 °C)      /60 (BP " "Location: Left arm, Patient Position: Lying)   Pulse 73   Temp 97.8 °F (36.6 °C) (Oral)   Resp 16   Ht 167.6 cm (66\")   Wt 99.3 kg (219 lb)   LMP  (LMP Unknown)   SpO2 94%   BMI 35.35 kg/m²         Physical Exam:    General: Patient is lying in bed in no acute distress.  Her sister and caregiver walked in as I was talking to her.  Respiratory: Effort even and unlabored, she is not conversationally dyspneic  Abdomen: Soft, nontender, nondistended, no rebound or guarding    Results Review:    I reviewed the patient's new clinical results.    Lab Results:    CBC:   Lab Results   Lab 05/25/24 0537 05/26/24 0419 05/27/24  0455 05/28/24 0411 05/29/24 0437 05/30/24 0413 05/31/24  0252   WBC 8.09 6.77 5.70 5.68 6.89 6.11 8.93   HEMOGLOBIN 7.5* 7.7* 7.5* 8.0* 8.2* 8.0* 8.2*   HEMATOCRIT 24.6* 25.1* 25.1* 26.9* 27.7* 27.6* 27.8*   PLATELETS 114* 127* 131* 177 185 187 220        AutoDiff:   Lab Results   Lab 05/25/24  0537   NEUTROPHIL % 67.5   LYMPHOCYTE % 17.8*   MONOCYTES % 12.7*   EOSINOPHIL % 0.1*   BASOPHIL % 0.4   NEUTROS ABS 5.46   LYMPHS ABS 1.44   MONOS ABS 1.03*   EOS ABS 0.01   BASOS ABS 0.03        Manual Diff:    Lab Results   Lab 05/25/24  0537   NEUTROS ABS 5.46           CMP:   Lab Results   Lab 05/25/24 0537 05/26/24 0419 05/29/24 0437 05/30/24 0413 05/31/24  0252   SODIUM 141   < > 142 141 141   POTASSIUM 3.5   < > 3.7 4.3 4.3   CHLORIDE 103   < > 107 107 108*   CO2 27.0   < > 26.0 27.0 24.0   BUN 22   < > 12 12 12   CREATININE 1.28*   < > 0.83 0.86 0.77   CALCIUM 8.5*   < > 8.1* 8.0* 8.1*   BILIRUBIN 0.5  --   --   --   --    ALK PHOS 62  --   --   --   --    ALT (SGPT) 26  --   --   --   --    AST (SGOT) 26  --   --   --   --    GLUCOSE 106*   < > 95 96 134*    < > = values in this interval not displayed.       Estimated Creatinine Clearance: 80.8 mL/min (by C-G formula based on SCr of 0.77 mg/dL).    Culture Results:    Microbiology Results (last 10 days)       Procedure Component Value - " Date/Time    Urine Culture - Urine, Urine, Clean Catch [[id number]]  (Abnormal)  (Susceptibility) Collected: 05/24/24 1312    Lab Status: Final result Specimen: Urine, Clean Catch Updated: 05/26/24 1110     Urine Culture >100,000 CFU/mL Proteus mirabilis    Narrative:      Colonization of the urinary tract without infection is common. Treatment is discouraged unless the patient is symptomatic, pregnant, or undergoing an invasive urologic procedure.    Susceptibility        Proteus mirabilis      LICHA      Amoxicillin + Clavulanate Susceptible      Ampicillin Susceptible      Ampicillin + Sulbactam Susceptible      Cefazolin Susceptible      Cefepime Susceptible      Ceftazidime Susceptible      Ceftriaxone Susceptible      Gentamicin Susceptible      Levofloxacin Susceptible      Nitrofurantoin Resistant      Piperacillin + Tazobactam Susceptible      Trimethoprim + Sulfamethoxazole Susceptible                           Blood Culture - Blood, Arm, Right [[id number]]  (Normal) Collected: 05/24/24 1110    Lab Status: Final result Specimen: Blood from Arm, Right Updated: 05/29/24 1145     Blood Culture No growth at 5 days    COVID-19 and FLU A/B PCR, 1 HR TAT - Swab, Nasopharynx [[id number]]  (Normal) Collected: 05/24/24 1106    Lab Status: Final result Specimen: Swab from Nasopharynx Updated: 05/24/24 1136     COVID19 Not Detected     Influenza A PCR Not Detected     Influenza B PCR Not Detected    Narrative:      Fact sheet for providers: https://www.fda.gov/media/544898/download    Fact sheet for patients: https://www.fda.gov/media/406591/download    Test performed by PCR.    Blood Culture - Blood, Hand, Digit Right [[id number]]  (Abnormal)  (Susceptibility) Collected: 05/24/24 1051    Lab Status: Final result Specimen: Blood from Hand, Digit Right Updated: 05/27/24 0519     Blood Culture Proteus mirabilis     Isolated from Aerobic Bottle     Gram Stain Aerobic Bottle Gram negative bacilli    Susceptibility         Proteus mirabilis      LICHA      Amoxicillin + Clavulanate Susceptible      Ampicillin Susceptible      Ampicillin + Sulbactam Susceptible      Cefepime Susceptible      Ceftazidime Susceptible      Ceftriaxone Susceptible      Gentamicin Susceptible      Levofloxacin Susceptible      Piperacillin + Tazobactam Susceptible      Trimethoprim + Sulfamethoxazole Susceptible                       Susceptibility Comments       Proteus mirabilis    Cefazolin sensitivity will not be reported for Enterobacteriaceae in non-urine isolates. If cefazolin is preferred, please call the microbiology lab to request an E-test.  With the exception of urinary-sourced infections, aminoglycosides should not be used as monotherapy.               Blood Culture ID, PCR - Blood, Hand, Digit Right [[id number]]  (Abnormal) Collected: 05/24/24 1051    Lab Status: Final result Specimen: Blood from Hand, Digit Right Updated: 05/25/24 0648     BCID, PCR Proteus spp. Identification by BCID2 PCR.     BOTTLE TYPE Aerobic Bottle    Narrative:      No resistance genes detected.                 Radiology:   Imaging Results (Last 72 Hours)       ** No results found for the last 72 hours. **                Active Hospital Problems    Diagnosis     Abnormal CT of the abdomen     Hypothyroidism     Paralysis     History of urinary retention     Cholelithiasis     Essential hypertension     Coronary artery disease involving native coronary artery of native heart without angina pectoris     Sick sinus syndrome     Venous insufficiency     Overweight (BMI 25.0-29.9)     Rheumatoid arthritis involving multiple sites     Chronic heart failure with preserved ejection fraction     Chronic anticoagulation     Iron deficiency anemia     Osteoporosis     Chronic embolism and thrombosis of unspecified deep veins of left lower extremity     T12 compression fracture, initial encounter        IMPRESSION:  Proteus bacteremia secondary to Proteus UTI-completed 7 days  antibiotic therapy with ceftriaxone.  History of recurrent urinary tract infections.-More recently carbapenem resistant Enterobacter and prior to that E. coli.  Recent HX CRE bacteremia  Remote HX of MSSA bacteremia  Immunocompromised patient with RA on leflunamide/prednisone  Dysphagia for last few months-patient was found to have esophageal stricture and underwent dilation May 30, 2024  Anemia-chronic        RECOMMENDATION:   Reiterated to patient today drinking up to 2 L of water.  We have talked about this for a few days and she seemed surprised by the amount when I talked her about today.  Also noted that her intravaginal estrogen and methenamine hippurate has been prescribed for discharge.   Explained to patient and caregiver that she needs to check with cardiologist or Dr. Oliveira when she sees them in follow-up regarding diuretics.  I did mention to SARAH Hyman that she wanted clarification on that she believes Dr. Ambriz told her not to take it daily.  I do not see a diuretic on her med list currently      Maggie Bang MD  05/31/24  13:14 CDT

## 2024-05-31 NOTE — DISCHARGE SUMMARY
[facility]  DISCHARGE SUMMARY       Date of Admission: 5/24/2024  Date of Discharge:  5/31/2024  Primary Care Physician: Moises Oliveira MD    Presenting Problem/History of Present Illness:  Patient is a 68 years old presented ER with complaint generalized weakness for last 2 weeks.  Complains of swollen legs and shortness of breath.  Patient required 2 L of oxygen in ER.  Patient stated breathing is worse when laying flat.  Patient denies any abdomen pain or chest pain.  Patient denies nausea vomiting or diarrhea symptoms.  Patient denies any fever night sweats chills .     Blood pressure slightly high, afebrile.  Patient is on 2 L nasal cannula.  BNP 5000.  Creatinine 1.08.  D-dimer is 2.4.  CT shows no DVT.  Platelet counts 115.  Hemoglobin 8.2.  UA positive for 2+ bacteria.     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 alphaherpesvirinae, elevated cholesterol, eustachian tube dysfunction, heart disease, herpes simplex, transfusion, hyperlipidemia, hypertension, hypothyroidism, intrinsic asthma, knee dislocation, labral tear of the right hip joint, laryngitis discussed, left carotid bruit, MI, myalgia due to statin, vasculitis of right femur, otorrhea, pacemaker, osteoarthritis, psoriasis vulgaris, status post stent placement, sensory neural hearing loss, recurrent UTI,  sjogren, sick sinus syndrome    Final Discharge Diagnoses:  Sepsis with Proteus bacteremia secondary to Proteus UTI, without organ dysfunction   Dysphagia due to partially obstructing benign esophageal stricture, status post balloon dilation  Gallstone with no sign of cholecystitis  Chronic (HFpEF)  heart failure with preserved ejection fraction  Active Hospital Problems    Diagnosis     **Fluid overload     Dysphagia     Abnormal CT of the abdomen     Hypothyroidism     Paralysis     History of urinary retention     Cholelithiasis     Essential hypertension     Coronary artery disease involving native coronary artery of native heart without angina pectoris     Sick sinus syndrome     Venous insufficiency     Overweight (BMI 25.0-29.9)     Rheumatoid arthritis involving multiple sites     (HFpEF) heart failure with preserved ejection fraction     Chronic anticoagulation     Iron deficiency anemia     Osteoporosis     Chronic embolism and thrombosis of unspecified deep veins of left lower extremity     T12 compression fracture, initial encounter      Consults:   Dr Tad Jones, GI  Dr Elie Ohara, ID  Dr Fidel Mccullough, cardiology    Procedures Performed:   EGD 5/3/2024 Dr Sanchez  Esophagus: Distal esophagus with multilevel benign-appearing circumferential narrowing occupying extending one fourth of the esophagus to the GE junction consistent with a partially obstructing benign esophageal stricture.  No ulcerations suggestive of active ulcerative esophagitis.  Wire-guided TTS/CRE balloon dilation at 12 mm without effect increasing incrementally to 16.5 and 18 mm noting several areas of mucosal tearing circumferentially in the distal third of the esophagus in addition to areas of stretched mucosa.  Additional disruptive dilation biopsies performed at the GE junction over a normal Z-line in addition to distal esophagus.  Hemostasis observed.     Stomach: Scattered erythema without any concerning ulcer/masses.     Duodenum: Normal first and second portion mucosa.  No biopsies obtained.    Pertinent Test Results:   Results for orders placed during the hospital encounter of 02/09/24    Adult Stress Echo W/ Cont or Stress Agent if Necessary Per Protocol    Interpretation Summary    Abnormal stress echo with  echocardiographic evidence for myocardial ischemia located in the lateral wall consistent with a high risk study for myocardial ischemia.    The following left ventricular wall segments are hypokinetic: mid anterolateral, apical lateral and mid inferolateral.    Left ventricular systolic function is normal at rest.      Imaging Results (All)       Procedure Component Value Units Date/Time    US Gallbladder [[id number]] Collected: 05/24/24 1425     Updated: 05/24/24 1429    Narrative:      EXAMINATION: US GALLBLADDER-     5/24/2024 1:01 PM     HISTORY: f/u on CTA chest abnormality; E87.70-Fluid overload,  unspecified; R53.81-Other malaise     Grey-scale and color flow ultrasound evaluation of the RUQ.     Appropriate size and position of the gallbladder.  9 mm gallstone.  No gallbladder wall thickening.  No biliary dilation.  CBD = 4 mm.     The visualized portion of the right hepatic lobe is normal.     The right kidney is normal, to the extent visualized.     No free fluid at the right upper quadrant.       Impression:      1. Gallstone with no sign of cholecystitis.                    This report was signed and finalized on 5/24/2024 2:26 PM by Dr. Jesus Manuel Santos MD.       CT Angiogram Chest [[id number]] Collected: 05/24/24 1322     Updated: 05/24/24 1332    Narrative:      EXAM: CT ANGIOGRAM CHEST- - 5/24/2024 11:42 AM     HISTORY: eval for PE; E87.70-Fluid overload, unspecified; R53.81-Other  malaise       COMPARISON: No existing relevant imaging studies available.     DOSE LENGTH PRODUCT: 224.56 mGy.cm. Automatic exposure control was  utilized to make radiation dose as low as reasonably achievable.     TECHNIQUE: Enhanced axial CT images obtained with multiplanar reformats.  3D postprocessing, including MIPs, performed and images saved to PACS.     FINDINGS:  AIRWAYS/PULMONARY PARENCHYMA: The central airways are midline and  patent. Expiratory phase of imaging.     Small bilateral pleural fluid. Small layering  groundglass opacity at the  fissures, which could represent small pulmonary edema/fluid or  atelectasis. No suspicious pulmonary mass or nodule.      VESSELS: Contrast bolus is adequate. No pulmonary artery filling defect  to suggest pulmonary embolus. Aorta normal in course and caliber with  calcified atherosclerosis.     CARDIAC: Cardiomegaly. Heavily calcified coronary arteries verses stent  material. No pericardial effusion.       MEDIASTINUM: There is no mediastinal or hilar lymphadenopathy by CT size  criteria.     The mid and distal esophagus appears diffusely thickened measuring 2.5  cm in cross-section.     EXTRATHORACIC: The visualized portions of the thyroid gland are  unremarkable. No thoracic inlet or axillary adenopathy. Cardiac pulse  generator in the LEFT chest wall. Otherwise overlying soft tissues with  mild stranding, suggest body wall edema.     INCLUDED UPPER ABDOMEN: 7 mm gallstone at the proximal gallbladder neck  or cystic duct. Partially visualized gallbladder appears distended, not  optimally evaluated on this exam. Visualized portion of the liver,  pancreas, spleen, adrenal glands appear within normal limits.     OSSEOUS: Kyphoplasty changes at T12. Mild height loss of T10 appears  similar to 8/22/2023.          Impression:      1. Small bilateral pleural fluid and small layering groundglass opacity  at the fissures, could represent small pulmonary edema/fluid or  atelectasis.  2. Gallstone at the proximal gallbladder neck or cystic duct. Partially  visualized gallbladder appears distended, not optimally evaluated on  this exam. Correlate with patient symptoms and consider dedicated  ultrasound.  3. Diffuse esophageal thickening of the mid and distal esophagus. This  is new compared to 4/22/2022. Recommend referral to gastroenterology for  consideration of direct visualization.     This report was signed and finalized on 5/24/2024 1:29 PM by Dr Paulina Tavares MD.       XR Chest 1 View  [[id number]] Collected: 05/24/24 1143     Updated: 05/24/24 1147    Narrative:      XR CHEST 1 VW-     HISTORY: shortness of breath     COMPARISON: 5/2/2024     FINDINGS: Frontal view the chest obtained.     Diminished level of inspiration. Similar positioning of the dual-lead  left subclavian cardiac pacer leads. Stable granulomatous calcifications  right hemithorax. Similar cardiomegaly. Borderline prominent pulmonary  vascular structures may relate to vascular crowding versus mild venous  congestion. No lobar consolidation. No pleural effusion or pneumothorax.  Postoperative changes cervical spine. Arthritic changes of the  shoulders.       Impression:      1. Low lung volumes. Stable cardiomegaly with similar positioning of the  left subclavian cardiac pacer leads. Vascular crowding versus mild  venous congestion. No consolidation.        This report was signed and finalized on 5/24/2024 11:44 AM by Dr. Trinity Gómez MD.             LAB RESULTS:      Lab 05/31/24  0252 05/30/24 0413 05/29/24 0437 05/28/24  0411 05/27/24  0455 05/26/24  0419 05/25/24  0537   WBC 8.93 6.11 6.89 5.68 5.70   < > 8.09   HEMOGLOBIN 8.2* 8.0* 8.2* 8.0* 7.5*   < > 7.5*   HEMATOCRIT 27.8* 27.6* 27.7* 26.9* 25.1*   < > 24.6*   PLATELETS 220 187 185 177 131*   < > 114*   NEUTROS ABS  --   --   --   --   --   --  5.46   IMMATURE GRANS (ABS)  --   --   --   --   --   --  0.12*   LYMPHS ABS  --   --   --   --   --   --  1.44   MONOS ABS  --   --   --   --   --   --  1.03*   EOS ABS  --   --   --   --   --   --  0.01   MCV 97.5* 98.2* 96.9 96.4 95.4   < > 93.9    < > = values in this interval not displayed.         Lab 05/31/24  0252 05/30/24 0413 05/29/24 0437 05/28/24  0411 05/27/24  0455 05/26/24  1233 05/26/24  0419 05/25/24  0537   SODIUM 141 141 142 140 141  --    < > 141   POTASSIUM 4.3 4.3 3.7 4.0 3.5  --    < > 3.5   CHLORIDE 108* 107 107 106 106  --    < > 103   CO2 24.0 27.0 26.0 27.0 25.0  --    < > 27.0   ANION GAP 9.0 7.0  9.0 7.0 10.0  --    < > 11.0   BUN 12 12 12 15 21  --    < > 22   CREATININE 0.77 0.86 0.83 0.92 1.14*  --    < > 1.28*   EGFR 83.1 72.8 75.9 67.1 51.9*  --    < > 45.2*   GLUCOSE 134* 96 95 77 84  --    < > 106*   CALCIUM 8.1* 8.0* 8.1* 8.1* 7.9*  --    < > 8.5*   MAGNESIUM  --   --   --   --   --  1.8  --  1.7   HEMOGLOBIN A1C  --   --   --   --   --   --   --  6.20*   TSH  --   --   --   --   --   --   --  2.000    < > = values in this interval not displayed.         Lab 05/25/24  0537   TOTAL PROTEIN 6.0   ALBUMIN 2.8*   GLOBULIN 3.2   ALT (SGPT) 26   AST (SGOT) 26   BILIRUBIN 0.5   ALK PHOS 62             Lab 05/25/24  0537   CHOLESTEROL 171   LDL CHOL 104*   HDL CHOL 23*   TRIGLYCERIDES 252*         Lab 05/26/24  0419   IRON 35*   IRON SATURATION (TSAT) 20   TIBC 179*   TRANSFERRIN 120*   FERRITIN 489.90*   VITAMIN B 12 >2,000*         Brief Urine Lab Results  (Last result in the past 365 days)        Color   Clarity   Blood   Leuk Est   Nitrite   Protein   CREAT   Urine HCG        05/24/24 1312 Yellow   Cloudy   Moderate (2+)   Large (3+)   Positive   Trace                 Microbiology Results (last 10 days)       Procedure Component Value - Date/Time    Urine Culture - Urine, Urine, Clean Catch [[id number]]  (Abnormal)  (Susceptibility) Collected: 05/24/24 1312    Lab Status: Final result Specimen: Urine, Clean Catch Updated: 05/26/24 1110     Urine Culture >100,000 CFU/mL Proteus mirabilis    Narrative:      Colonization of the urinary tract without infection is common. Treatment is discouraged unless the patient is symptomatic, pregnant, or undergoing an invasive urologic procedure.    Susceptibility        Proteus mirabilis      LICHA      Amoxicillin + Clavulanate Susceptible      Ampicillin Susceptible      Ampicillin + Sulbactam Susceptible      Cefazolin Susceptible      Cefepime Susceptible      Ceftazidime Susceptible      Ceftriaxone Susceptible      Gentamicin Susceptible      Levofloxacin  Susceptible      Nitrofurantoin Resistant      Piperacillin + Tazobactam Susceptible      Trimethoprim + Sulfamethoxazole Susceptible                           Blood Culture - Blood, Arm, Right [[id number]]  (Normal) Collected: 05/24/24 1110    Lab Status: Final result Specimen: Blood from Arm, Right Updated: 05/29/24 1145     Blood Culture No growth at 5 days    COVID-19 and FLU A/B PCR, 1 HR TAT - Swab, Nasopharynx [[id number]]  (Normal) Collected: 05/24/24 1106    Lab Status: Final result Specimen: Swab from Nasopharynx Updated: 05/24/24 1136     COVID19 Not Detected     Influenza A PCR Not Detected     Influenza B PCR Not Detected    Narrative:      Fact sheet for providers: https://www.fda.gov/media/380520/download    Fact sheet for patients: https://www.fda.gov/media/236641/download    Test performed by PCR.    Blood Culture - Blood, Hand, Digit Right [[id number]]  (Abnormal)  (Susceptibility) Collected: 05/24/24 1051    Lab Status: Final result Specimen: Blood from Hand, Digit Right Updated: 05/27/24 0519     Blood Culture Proteus mirabilis     Isolated from Aerobic Bottle     Gram Stain Aerobic Bottle Gram negative bacilli    Susceptibility        Proteus mirabilis      LICHA      Amoxicillin + Clavulanate Susceptible      Ampicillin Susceptible      Ampicillin + Sulbactam Susceptible      Cefepime Susceptible      Ceftazidime Susceptible      Ceftriaxone Susceptible      Gentamicin Susceptible      Levofloxacin Susceptible      Piperacillin + Tazobactam Susceptible      Trimethoprim + Sulfamethoxazole Susceptible                       Susceptibility Comments       Proteus mirabilis    Cefazolin sensitivity will not be reported for Enterobacteriaceae in non-urine isolates. If cefazolin is preferred, please call the microbiology lab to request an E-test.  With the exception of urinary-sourced infections, aminoglycosides should not be used as monotherapy.               Blood Culture ID, PCR - Blood, Hand, Digit  Right [[id number]]  (Abnormal) Collected: 05/24/24 1051    Lab Status: Final result Specimen: Blood from Hand, Digit Right Updated: 05/25/24 0648     BCID, PCR Proteus spp. Identification by BCID2 PCR.     BOTTLE TYPE Aerobic Bottle    Narrative:      No resistance genes detected.            Hospital Course:   Shortness of breath.  Vascular congestion.  Patient received 80 of Lasix in ER. Improved.  Chest x-ray-Low lung volumes. Stable cardiomegaly with similar positioning of the  left subclavian cardiac pacer leads- Vascular crowding versus mild venous congestion,  No consolidation.  CT of the chest-Small bilateral pleural fluid and small layering groundglass opacity  at the fissures- could represent small pulmonary edema/fluid or atelectasis, Gallstone at the proximal gallbladder neck or cystic duct- Partially visualized gallbladder appears distended- not optimally evaluated on this exam, Diffuse esophageal thickening of the mid and distal esophagus- is new compared to 4/22/2022, recommend referral to gastroenterology for consideration of direct visualization.  Patient is on room air.     CAD/hypertension/hyperlipidemia/pacemaker due to history of sick sinus syndrome.  Aspirin. .  BNP 5000 in ER.  Patient received 80 of Lasix in the ER.  Imdur .  Continue Cozaar.  Nitro as needed.  Coreg.  Daily weight.  Strict in and out.    Allergic to statin  History of positive stress test 2/9/24    Abnormal stress echo with echocardiographic evidence for myocardial ischemia located in the lateral wall consistent with a high risk study for myocardial ischemia.    The following left ventricular wall segments are hypokinetic: mid anterolateral, apical lateral and mid inferolateral.    Left ventricular systolic function is normal at rest.  Consulted cardiology for recommendations and EGD clearance, see consult for recommendations.     Gallstone proximal gallbladder neck and cystic duct/diffuse esophageal thickening of mid and  "distal esophagus. GI consult input noted  Status post EGD with findings of esophageal stricture partially dilated with improved symptoms.  No bleeding issues noted.  Recommended to start Eliquis on Saturday.  Ultrasound the gallbladder-Gallstone with no sign of cholecystitis.   Dysphagia/CT noted thickened mid/distal esophagus  GI recommendation-no additional workup for cholelithiasis until patients become symptomatic, dysphagia for last couple months-EGD performed with finding of esophageal stricture status post balloon dilatation with good results. Recommendations:  -Follow-up biopsies  -GI soft diet  -Pantoprazole 40 mg p.o. daily 30 minutes before breakfast  -Follow-up in the GI office in 2 to 4 weeks for reevaluation.  Consider repeat EGD with savory dilation under fluoroscopic guidance with goal of maximum dilation of 20 mm.  -Ideally restart Eliquis on Saturday a.m. if possible.  If absolutely needs to be restarted sooner that would be okay as well.  Will watch for signs of bleeding.     UTI and bacteremia secondary to Proteus      Infect disease consult input noted.   Cefepime narrowed to Rocephin and treatment completed.  Considered additional oral therapy but elected not to based on bacteremia with known source of urinary tract infection otherwise not complicated.   \"reduce recurrent infections by drinking approximately 2 L of water a day  intravaginal estrogen  consideration for methenamine hippurate  History of CRE.      Chronic stage IIIb renal failure.  Creatinine increased to 1.52, now 1.14, probably secondary to Lasix one-time dose in ER 80 mg.     Rheumatoid arthritis/Hx Sjogren .  Biotene.  Magic mouthwash. Arava.  Prednisone daily.     History of DVT . Eliquis on hold for EGD.    Physical Exam on Discharge:  /62 (BP Location: Left arm, Patient Position: Lying)   Pulse 76   Temp 97.5 °F (36.4 °C) (Oral)   Resp 16   Ht 167.6 cm (66\")   Wt 99.3 kg (219 lb)   LMP  (LMP Unknown)   SpO2 93% "   BMI 35.35 kg/m²   Physical Exam  Vitals reviewed.   Constitutional:       General: She is not in acute distress.     Appearance: She is well-developed. She is not toxic-appearing.   HENT:      Head: Normocephalic and atraumatic.      Right Ear: External ear normal.      Left Ear: External ear normal.      Mouth/Throat:      Mouth: Mucous membranes are moist.      Pharynx: Oropharynx is clear.   Eyes:      General:         Right eye: No discharge.         Left eye: No discharge.      Extraocular Movements: Extraocular movements intact.      Conjunctiva/sclera: Conjunctivae normal.      Pupils: Pupils are equal, round, and reactive to light.   Neck:      Vascular: No JVD.   Cardiovascular:      Rate and Rhythm: Normal rate and regular rhythm.      Pulses: Normal pulses.      Heart sounds: Normal heart sounds. No murmur heard.     No friction rub. No gallop.   Pulmonary:      Effort: Pulmonary effort is normal. No respiratory distress.      Breath sounds: No stridor. No wheezing, rhonchi or rales.   Chest:      Chest wall: No tenderness.   Abdominal:      General: Bowel sounds are normal. There is no distension.      Palpations: Abdomen is soft.      Tenderness: There is no abdominal tenderness. There is no guarding or rebound.      Hernia: No hernia is present.   Musculoskeletal:         General: No swelling, tenderness or deformity. Normal range of motion.      Cervical back: Normal range of motion and neck supple. No rigidity or tenderness. No muscular tenderness.      Right lower leg: No edema.      Left lower leg: No edema.   Skin:     General: Skin is warm and dry.      Findings: No erythema or rash.   Neurological:      General: No focal deficit present.      Mental Status: She is alert and oriented to person, place, and time.      Cranial Nerves: No cranial nerve deficit.      Sensory: No sensory deficit.      Motor: No weakness or abnormal muscle tone.      Deep Tendon Reflexes: Reflexes normal.    Psychiatric:         Mood and Affect: Mood normal.         Behavior: Behavior normal.     Condition on Discharge:   Stable    Discharge Disposition:  Home with family, patient has 24 hour support    Discharge Medications:     Discharge Medications        New Medications        Instructions Start Date   estradiol 0.1 MG/GM vaginal cream  Commonly known as: ESTRACE   2 applicators, Vaginal, Daily      methenamine 1 g tablet  Commonly known as: HIPREX   1 g, Oral, 2 Times Daily With Meals      pantoprazole 40 MG EC tablet  Commonly known as: Protonix   40 mg, Oral, Daily             Continue These Medications        Instructions Start Date   acetaminophen 325 MG tablet  Commonly known as: TYLENOL   2 tablets, Oral, Every 6 Hours PRN      apixaban 5 MG tablet tablet  Commonly known as: Eliquis   5 mg, Oral, 2 Times Daily      aspirin 81 MG EC tablet   81 mg, Oral, Daily      carvedilol 25 MG tablet  Commonly known as: COREG   1 tablet, Oral, 2 Times Daily With Meals      Diclofenac Sodium 1 % gel gel  Commonly known as: VOLTAREN   4 g, Topical, 4 Times Daily PRN      DULoxetine 60 MG capsule  Commonly known as: CYMBALTA   1 capsule, Oral, Daily      ferrous sulfate 324 (65 Fe) MG tablet delayed-release EC tablet   1 tablet, Oral, Daily With Breakfast      folic acid 1 MG tablet  Commonly known as: FOLVITE   1 tablet, Oral, Daily      furosemide 40 MG tablet  Commonly known as: LASIX   40 mg, Oral, Daily      isosorbide mononitrate 60 MG 24 hr tablet  Commonly known as: IMDUR   1 tablet, Oral, Daily      leflunomide 20 MG tablet  Commonly known as: ARAVA   20 mg, Oral, Every Night at Bedtime      levothyroxine 75 MCG tablet  Commonly known as: SYNTHROID, LEVOTHROID   1 tablet, Oral, Daily      lidocaine 5 %  Commonly known as: Lidoderm   1 patch, Transdermal, Daily PRN, Remove & Discard patch within 12 hours or as directed by MD      losartan 50 MG tablet  Commonly known as: COZAAR   1 tablet, Oral, Daily       magnesium hydroxide 400 MG/5ML suspension  Commonly known as: MILK OF MAGNESIA   30 mL, Oral, Daily PRN      multivitamin with minerals tablet tablet   1 tablet, Oral, Daily      nitroglycerin 0.4 MG SL tablet  Commonly known as: NITROSTAT   1 tablet, Sublingual, Every 5 Minutes PRN, Take no more than 3 doses in 15 minutes.      ondansetron 4 MG tablet  Commonly known as: Zofran   4 mg, Oral, Every 8 Hours PRN      predniSONE 5 MG tablet  Commonly known as: DELTASONE   1 tablet, Oral, Daily      traMADol 50 MG tablet  Commonly known as: ULTRAM   1 tablet, Oral, Every 12 Hours PRN      valACYclovir 500 MG tablet  Commonly known as: VALTREX   1 tablet, Oral, Daily PRN      Vitamin C 500 MG capsule   1 tablet, Oral, Daily               This patient has current or prior documentation of an left ventricular ejection fraction (LVEF) normal    Discharge Diet:   Gastrointestinal fiber restricted textured soft to chew (NDD 3) whole meat, fluid consistency thin    Activity at Discharge:   Resume usual activity    Follow-up Appointments:   Future Appointments   Date Time Provider Department Center   6/12/2024 11:00 AM Ximena Vazquez APRN [facility] CD PAD PAD   6/19/2024 10:30 AM Ruthann Martinez APRN [facility] POD PAD PAD   6/20/2024 10:15 AM Moises Oliveira MD [facility] PC VSQ PAD   1/24/2025 10:45 AM [facility] PAD DEVICE CHECK W CD PAD PAD   1/24/2025 11:15 AM Omar Hernandez MD [facility] CD PAD PAD       Test Results Pending at Discharge: biopsies GE junction    Electronically signed by Kris Cade MD, 05/31/24, 11:19 CDT.    Time: 41 minutes.

## 2024-06-01 ENCOUNTER — HOME CARE VISIT (OUTPATIENT)
Dept: HOME HEALTH SERVICES | Facility: CLINIC | Age: 69
End: 2024-06-01
Payer: MEDICARE

## 2024-06-01 VITALS
DIASTOLIC BLOOD PRESSURE: 82 MMHG | HEART RATE: 62 BPM | OXYGEN SATURATION: 95 % | RESPIRATION RATE: 18 BRPM | TEMPERATURE: 97.3 F | SYSTOLIC BLOOD PRESSURE: 128 MMHG

## 2024-06-01 PROCEDURE — G0299 HHS/HOSPICE OF RN EA 15 MIN: HCPCS

## 2024-06-01 NOTE — THERAPY DISCHARGE NOTE
Acute Care - Occupational Therapy Discharge Summary  [facility]     Patient Name: Tawny Shin  : 1955  MRN: [id number]    Today's Date: 2024  Onset of Illness/Injury or Date of Surgery: 24    Date of Referral to OT: 24  Referring Physician: Dr. Ambriz      Admit Date: 2024        OT Recommendation and Plan    Visit Dx:    ICD-10-CM ICD-9-CM   1. Hypervolemia, unspecified hypervolemia type  E87.70 276.69   2. Debility  R53.81 799.3   3. Esophageal dysphagia  R13.19 787.29   4. Impaired mobility [Z74.09]  Z74.09 799.89   5. Iron deficiency anemia, unspecified iron deficiency anemia type  D50.9 280.9   6. Abnormal CT of the abdomen  R93.5 793.6   7. Dysphagia, unspecified type  R13.10 787.20   8. Other hypervolemia  E87.79 276.69   9. Coronary artery disease involving native coronary artery of native heart without angina pectoris  I25.10 414.01   10. Chronic heart failure with preserved ejection fraction  I50.32 428.9                OT Rehab Goals       Row Name 24 1605             Transfer Goal 1 (OT)    Activity/Assistive Device (Transfer Goal 1, OT) bed-to-chair/chair-to-bed;commode, bedside without drop arms;shower chair  -MT      [place] Level/Cues Needed (Transfer Goal 1, OT) minimum assist (75% or more patient effort)  -MT      Time Frame (Transfer Goal 1, OT) long term goal (LTG);10 days  -MT      Progress/Outcome (Transfer Goal 1, OT) goal not met  -MT         Bathing Goal 1 (OT)    Activity/Device (Bathing Goal 1, OT) bathing skills, all;shower chair  -MT      [place] Level/Cues Needed (Bathing Goal 1, OT) minimum assist (75% or more patient effort)  -MT      Time Frame (Bathing Goal 1, OT) long term goal (LTG);10 days  -MT      Progress/Outcomes (Bathing Goal 1, OT) goal not met  -MT         Strength Goal 1 (OT)    Strength Goal 1 (OT) Increased BUE strength to 4+/5 to increase independence with ADL  -MT      Time Frame (Strength Goal 1, OT) long term goal  (LTG);10 days  -MT      Progress/Outcome (Strength Goal 1, OT) goal not met  -MT                User Key  (r) = Recorded By, (t) = Taken By, (c) = Cosigned By      Initials Name Provider Type Discipline    Mirian Best COTA Occupational Therapist Assistant OT                     Outcome Measures       Row Name 05/30/24 1000             How much help from another person do you currently need...    Turning from your back to your side while in flat bed without using bedrails? 3  -KJ      Moving from lying on back to sitting on the side of a flat bed without bedrails? 3  -KJ      Moving to and from a bed to a chair (including a wheelchair)? 2  -KJ      Standing up from a chair using your arms (e.g., wheelchair, bedside chair)? 2  -KJ      Climbing 3-5 steps with a railing? 1  -KJ      To walk in hospital room? 2  -KJ      AM-PAC 6 Clicks Score (PT) 13  -KJ      Highest Level of Mobility Goal 4 --> Transfer to chair/commode  -KJ         Functional Assessment    Outcome Measure Options AM-PAC 6 Clicks Basic Mobility (PT)  -KJ                User Key  (r) = Recorded By, (t) = Taken By, (c) = Cosigned By      Initials Name Provider Type    Emilee Gloria, PTA Physical Therapist Assistant                    Timed Therapy Charges  Total Units: 3      Suggested Charges  Total Units: 3      Procedure Name Documented Minutes Units Code    HC OT SELF CARE/MGMT/TRAIN EA 15 MIN 46 3   58841 (CPT®)                 Documented Minutes  Total Minutes: 46      Therapy Provided Minutes    23502 - OT Self Care/Mgmt Minutes 46                        OT Discharge Summary  Reason for Discharge: Discharge from facility  Outcomes Achieved: Refer to plan of care for updates on goals achieved  Discharge Destination: Home with home health      GALLITO Pappas  6/1/2024

## 2024-06-01 NOTE — HOME HEALTH
Resumption of Care Note: Patient is a 68 years old presented ER on 5/24 with complaint generalized weakness for prior 2 weeks. Complains of swollen legs and shortness of breath. Patient required 2 L of oxygen in ER. Patient received 80 of Lasix in ER. Improved SOB,   Gallstone proximal gallbladder neck and cystic duct/diffuse esophageal thickening of mid and distal esophagus. -  Status post EGD with findings of esophageal stricture partially dilated with improved symptoms..  Ultrasound the gallbladder-Gallstone with no sign of cholecystitis.   Dysphagia/CT noted thickened mid/distal esophagus  GI recommendation-no additional workup for cholelithiasis until patients become symptomatic, dysphagia for last couple months-EGD performed with finding of esophageal stricture status post balloon dilatation with good results. Recommendations:  -Follow-up biopsies  -GI soft diet  -Pantoprazole 40 mg p.o. daily 30 minutes before breakfast  -Follow-up in the GI office in 2 to 4 weeks for reevaluation. Consider repeat EGD with savory dilation under fluoroscopic guidance with goal of maximum dilation of 20 mm.     UTI and bacteremia secondary to Proteus -Considered additional oral therapy but elected not to based on bacteremia with known source of urinary tract infection -reduce recurrent infections by drinking approximately 2 L of water a day,   intravaginal estrogen  methenamine hippurate     Chronic stage IIIb renal failure. Creatinine increased to 1.52, now 1.14, probably secondary to Lasix one-time dose in ER 80 mg.       New/changed medications: estradiol 0.1 MG/GM vaginal cream---2 applicators, Vaginal, Daily                                              methenamine 1 g tablet----------1 g, Oral, 2 Times Daily                                             pantoprazole 40 MG EC tablet---------40 mg, Oral, Daily    New/changed orders: new medication orders    Plan/Focus of Care: Hypertensive heart and chronic kidney disease with  heart failure and stage 1 through stage 4 chronic kidney disease, or unspecified chronic kidney disease    Plan for next visit: assess cardiopulmonary, medication management and education, infection prevention, fall prevention, wound assessment

## 2024-06-02 NOTE — THERAPY DISCHARGE NOTE
Acute Care - Physical Therapy Discharge Summary  [facility]       Patient Name: Tawny Shin  : 1955  MRN: [id number]    Today's Date: 2024  Onset of Illness/Injury or Date of Surgery: 24       Referring Physician: Dr. Ambriz      Admit Date: 2024      PT Recommendation and Plan    Visit Dx:    ICD-10-CM ICD-9-CM   1. Hypervolemia, unspecified hypervolemia type  E87.70 276.69   2. Debility  R53.81 799.3   3. Esophageal dysphagia  R13.19 787.29   4. Impaired mobility [Z74.09]  Z74.09 799.89   5. Iron deficiency anemia, unspecified iron deficiency anemia type  D50.9 280.9   6. Abnormal CT of the abdomen  R93.5 793.6   7. Dysphagia, unspecified type  R13.10 787.20   8. Other hypervolemia  E87.79 276.69   9. Coronary artery disease involving native coronary artery of native heart without angina pectoris  I25.10 414.01   10. Chronic heart failure with preserved ejection fraction  I50.32 428.9                PT Rehab Goals       Row Name 24 1257             Bed Mobility Goal 1 (PT)    Activity/Assistive Device (Bed Mobility Goal 1, PT) bed mobility activities, all  -KINGS      [place] Level/Cues Needed (Bed Mobility Goal 1, PT) independent  -KINGS      Time Frame (Bed Mobility Goal 1, PT) long term goal (LTG);by discharge  -KINGS      Progress/Outcomes (Bed Mobility Goal 1, PT) goal not met  -KINGS         Transfer Goal 1 (PT)    Activity/Assistive Device (Transfer Goal 1, PT) sit-to-stand/stand-to-sit;bed-to-chair/chair-to-bed;walker, rolling  -KINGS      [place] Level/Cues Needed (Transfer Goal 1, PT) modified independence  -KINGS      Time Frame (Transfer Goal 1, PT) long term goal (LTG);by discharge  -KINGS      Progress/Outcome (Transfer Goal 1, PT) goal not met  -KINGS         Gait Training Goal 1 (PT)    Activity/Assistive Device (Gait Training Goal 1, PT) gait (walking locomotion);assistive device use;decrease fall risk;increase endurance/gait distance;improve balance and speed;walker, rolling   -KINGS      [place] Level (Gait Training Goal 1, PT) modified independence  -KINGS      Distance (Gait Training Goal 1, PT) 20ft  -KINGS      Time Frame (Gait Training Goal 1, PT) long term goal (LTG);by discharge  -KINGS      Progress/Outcome (Gait Training Goal 1, PT) goal not met  -KINGS                User Key  (r) = Recorded By, (t) = Taken By, (c) = Cosigned By      Initials Name Provider Type Discipline    Garcia Jaramillo, PTA Physical Therapist Assistant PT                        PT Discharge Summary  Anticipated Discharge Disposition (PT): home with assist  Reason for Discharge: Discharge from facility  Outcomes Achieved: Refer to plan of care for updates on goals achieved  Discharge Destination: Home with assist      Garcia Francis PTA   6/2/2024

## 2024-06-03 ENCOUNTER — HOME CARE VISIT (OUTPATIENT)
Dept: HOME HEALTH SERVICES | Facility: CLINIC | Age: 69
End: 2024-06-03
Payer: MEDICARE

## 2024-06-03 ENCOUNTER — TRANSITIONAL CARE MANAGEMENT TELEPHONE ENCOUNTER (OUTPATIENT)
Dept: CALL CENTER | Facility: HOSPITAL | Age: 69
End: 2024-06-03
Payer: MEDICARE

## 2024-06-03 ENCOUNTER — TELEPHONE (OUTPATIENT)
Dept: GASTROENTEROLOGY | Facility: CLINIC | Age: 69
End: 2024-06-03
Payer: MEDICARE

## 2024-06-03 VITALS
DIASTOLIC BLOOD PRESSURE: 70 MMHG | SYSTOLIC BLOOD PRESSURE: 120 MMHG | TEMPERATURE: 95.3 F | HEART RATE: 92 BPM | OXYGEN SATURATION: 90 % | RESPIRATION RATE: 16 BRPM

## 2024-06-03 DIAGNOSIS — G82.20 LOWER PARAPLEGIA: ICD-10-CM

## 2024-06-03 DIAGNOSIS — S22.080A T12 COMPRESSION FRACTURE, INITIAL ENCOUNTER: ICD-10-CM

## 2024-06-03 DIAGNOSIS — R53.1 GENERALIZED WEAKNESS: Primary | ICD-10-CM

## 2024-06-03 DIAGNOSIS — E66.3 OVERWEIGHT (BMI 25.0-29.9): ICD-10-CM

## 2024-06-03 DIAGNOSIS — F44.4 FUNCTIONAL NEUROLOGICAL SYMPTOM DISORDER WITH WEAKNESS OR PARALYSIS: ICD-10-CM

## 2024-06-03 DIAGNOSIS — G89.4 CHRONIC PAIN SYNDROME: ICD-10-CM

## 2024-06-03 DIAGNOSIS — M06.9 RHEUMATOID ARTHRITIS INVOLVING MULTIPLE SITES, UNSPECIFIED WHETHER RHEUMATOID FACTOR PRESENT: ICD-10-CM

## 2024-06-03 PROCEDURE — G0151 HHCP-SERV OF PT,EA 15 MIN: HCPCS

## 2024-06-03 NOTE — OUTREACH NOTE
Call Center TCM Note      Flowsheet Row Responses   [facility] patient discharged from? [place]   Does the patient have one of the following disease processes/diagnoses(primary or secondary)? CHF   TCM attempt successful? No   Unsuccessful attempts Attempt 2             Juanis Garcia LPN    6/3/2024, 14:49 CDT

## 2024-06-03 NOTE — HOME HEALTH
Routine Visit Note:    Skill/education provided: Patient educated on HEP and completing frequent bouts of activity throughout the day to increase functional strength and endurance.    Patient/caregiver response: Patient tolerates therapy session with sitting edge of bed for 10 minutes and completing HEP exercises in sitting and supine.    Plan for next visit: Complete transfer training and progress HEP as patient tolerates.    Other pertinent info: Patient has 2 unhealed wounds on her right hip and buttock. Patient awaiting arrivial of [facility].

## 2024-06-03 NOTE — TELEPHONE ENCOUNTER
Pt needs a fu with catie John 2-4 weeks per dr colvin to consider repeat egd-left patient a message to call to shamir an office visit

## 2024-06-03 NOTE — OUTREACH NOTE
Call Center TCM Note      Flowsheet Row Responses   [facility] patient discharged from? [place]   Does the patient have one of the following disease processes/diagnoses(primary or secondary)? CHF   TCM attempt successful? No   Unsuccessful attempts Attempt 1             Juanis Garcia LPN    6/3/2024, 12:40 CDT

## 2024-06-04 ENCOUNTER — HOME CARE VISIT (OUTPATIENT)
Dept: HOME HEALTH SERVICES | Facility: CLINIC | Age: 69
End: 2024-06-04
Payer: MEDICARE

## 2024-06-04 ENCOUNTER — TRANSITIONAL CARE MANAGEMENT TELEPHONE ENCOUNTER (OUTPATIENT)
Dept: CALL CENTER | Facility: HOSPITAL | Age: 69
End: 2024-06-04
Payer: MEDICARE

## 2024-06-04 VITALS
SYSTOLIC BLOOD PRESSURE: 124 MMHG | DIASTOLIC BLOOD PRESSURE: 62 MMHG | OXYGEN SATURATION: 93 % | RESPIRATION RATE: 20 BRPM | TEMPERATURE: 97.2 F | HEART RATE: 71 BPM

## 2024-06-04 PROCEDURE — G0300 HHS/HOSPICE OF LPN EA 15 MIN: HCPCS

## 2024-06-04 NOTE — OUTREACH NOTE
Call Center TCM Note      Flowsheet Row Responses   [facility] [place] patient discharged from? [place]   Does the patient have one of the following disease processes/diagnoses(primary or secondary)? CHF   TCM attempt successful? No   Unsuccessful attempts Attempt 3             Joy Sousa LPN    6/4/2024, 13:29 CDT

## 2024-06-04 NOTE — HOME HEALTH
Routine Visit Note:    SKill/Education Provided: Assessment, education-UTI symptoms/s&s of infection/hydration and protein importance, wound assessment and cleansing, photo and measurement    Patient/Caregiver Response: Patient voices understanding with all education this visit.     Falls: No    Med Changes: No    Physician Contact: No     Plan for Next Visit: Assessment, education-meds reviewed/edema control/fall prevention, wound assessment    Other: No s/s of Covid noted.

## 2024-06-05 ENCOUNTER — HOME CARE VISIT (OUTPATIENT)
Dept: HOME HEALTH SERVICES | Facility: CLINIC | Age: 69
End: 2024-06-05
Payer: MEDICARE

## 2024-06-05 VITALS
OXYGEN SATURATION: 94 % | TEMPERATURE: 97.4 F | RESPIRATION RATE: 18 BRPM | HEART RATE: 74 BPM | SYSTOLIC BLOOD PRESSURE: 130 MMHG | DIASTOLIC BLOOD PRESSURE: 58 MMHG

## 2024-06-05 PROCEDURE — G0152 HHCP-SERV OF OT,EA 15 MIN: HCPCS

## 2024-06-05 PROCEDURE — G0157 HHC PT ASSISTANT EA 15: HCPCS

## 2024-06-05 NOTE — HOME HEALTH
SUBJECTIVE: Patient says the top of her legs are sore today (quads). Patient's caregiver states she has been very weak over the past 2 days. She says she has stayed in bed for the last few days and is very weakn.    SIGNS AND SYMPTOMS OF COVID:no    FALLS: no    INSURANCE CHANGES: no    MEDICATION CHANGES: no    EDEMA: none observed    SKILLS/EDUCATION and RESPONSE: Patient remains weak is limited to EOB sitting this date. She was able to maintain 10 minutes before requesting to lie back down due to inc. back pain. She performs all exercises with limitation on LLE due to signficant weakness.    PLAN: Attempt slideboard transfers and standing at next visit. Continue with TE.

## 2024-06-06 ENCOUNTER — OFFICE VISIT (OUTPATIENT)
Dept: CARDIOLOGY | Facility: CLINIC | Age: 69
End: 2024-06-06
Payer: MEDICARE

## 2024-06-06 ENCOUNTER — TELEPHONE (OUTPATIENT)
Dept: CARDIOLOGY | Facility: CLINIC | Age: 69
End: 2024-06-06

## 2024-06-06 VITALS
SYSTOLIC BLOOD PRESSURE: 122 MMHG | OXYGEN SATURATION: 94 % | DIASTOLIC BLOOD PRESSURE: 60 MMHG | HEART RATE: 70 BPM | WEIGHT: 210 LBS | HEIGHT: 66 IN | BODY MASS INDEX: 33.75 KG/M2

## 2024-06-06 VITALS
SYSTOLIC BLOOD PRESSURE: 130 MMHG | TEMPERATURE: 97.4 F | OXYGEN SATURATION: 94 % | DIASTOLIC BLOOD PRESSURE: 58 MMHG | HEART RATE: 74 BPM | RESPIRATION RATE: 18 BRPM

## 2024-06-06 DIAGNOSIS — E78.5 HYPERLIPIDEMIA LDL GOAL <70: ICD-10-CM

## 2024-06-06 DIAGNOSIS — I11.0 HYPERTENSIVE HEART DISEASE WITH HEART FAILURE: ICD-10-CM

## 2024-06-06 DIAGNOSIS — Z95.0 PRESENCE OF CARDIAC PACEMAKER: ICD-10-CM

## 2024-06-06 DIAGNOSIS — I10 ESSENTIAL HYPERTENSION: Chronic | ICD-10-CM

## 2024-06-06 DIAGNOSIS — I25.10 CORONARY ARTERY DISEASE INVOLVING NATIVE CORONARY ARTERY OF NATIVE HEART WITHOUT ANGINA PECTORIS: ICD-10-CM

## 2024-06-06 DIAGNOSIS — I50.9 ACUTE CONGESTIVE HEART FAILURE, UNSPECIFIED HEART FAILURE TYPE: Primary | ICD-10-CM

## 2024-06-06 DIAGNOSIS — E66.09 CLASS 1 OBESITY DUE TO EXCESS CALORIES WITH SERIOUS COMORBIDITY AND BODY MASS INDEX (BMI) OF 33.0 TO 33.9 IN ADULT: ICD-10-CM

## 2024-06-06 PROBLEM — E66.811 CLASS 1 OBESITY DUE TO EXCESS CALORIES WITH SERIOUS COMORBIDITY AND BODY MASS INDEX (BMI) OF 33.0 TO 33.9 IN ADULT: Status: ACTIVE | Noted: 2022-04-23

## 2024-06-06 RX ORDER — SPIRONOLACTONE 25 MG/1
25 TABLET ORAL DAILY
Qty: 30 TABLET | Refills: 11 | Status: SHIPPED | OUTPATIENT
Start: 2024-06-06

## 2024-06-06 NOTE — TELEPHONE ENCOUNTER
----- Message from Edna NOEL sent at 6/6/2024 12:59 PM CDT -----  Per Jackeline, patient never picked up prescription for Repatha.  ----- Message -----  From: Anamaria White APRN  Sent: 6/6/2024  12:46 PM CDT  To: Edna Mullins MA    She should be on Rapatha. Can you follow up with her or her pharmacy and see if she is still taking it?      Thanks.

## 2024-06-06 NOTE — PROGRESS NOTES
"    Subjective:     Encounter Date:06/06/2024      Patient ID: Tawny Shin is a 68 y.o. female.    Chief Complaint:\"I was in the hospital\"  Congestive Heart Failure  Presents for follow-up visit. Pertinent negatives include no chest pain, near-syncope, palpitations or shortness of breath. The symptoms have been stable. Her past medical history is significant for CAD.   Coronary Artery Disease  Presents for follow-up visit. Pertinent negatives include no chest pain, dizziness, leg swelling, palpitations, shortness of breath or weight gain. Her past medical history is significant for CHF. The symptoms have been stable.     Patient presents today as a hospital follow up. She is currently followed by Dr. Hernandez and DANIELA Cooper for management of CAD s/p PCI in the RCA in 2010 and LAD in 2013 and SSS s/p pacemaker implant. She saw Ximena in January and noted 3 episodes of chest pain with radiation into the left arm which was different than her previous MI pain and had not had any pain in the week leading up to her appointment. She was contacted a few weeks later to follow up on reoccurring chest pain. At that time she denies issues with chest pain but noted issues with left arm discomfort. At that time, a stress echo was ordered and noted to be high risk for ischemia. She was contacted regarding results and requested to postpone LHC as she had not had issues with recurrent symptoms.     She was admitted to the hospital in April and was treated for UTI with sepsis. She was admitted again on 5/2 and treated for UTI with bacteremia. She presented back to the hospital on 5/24 with complaints of weakness and sudden onset orthopnea. She was noted to be volume overloaded with an elevated BNP >5100 and imagining (CXR and CTA chest) noting vascular crowding, and small bilateral pleural effusions. She received Lasix 80mg IV while in the ER and another 20mg IV after admission and did not require further diuresis. Cardiology " "was consulted for \"clearance\" prior to an EGD due to dysphagia. She underwent esophageal dilation during her stay and was ultimately discharged home following a 7 day stay.     She reports she feels well. She has had no further orthopnea  or shortness of breath with talking since discharge. She notes prior to admission, her edema was stable and no worse than previous. She is wheelchair bound therefore cannot weigh daily. She sleeps flat and denies PND. She monitors her BP at home with reported readings of 120/80s. She denies chest pain, pressure and tightness and left arm discomfort.       The following portions of the patient's history were reviewed and updated as appropriate: allergies, current medications, past family history, past medical history, past social history, past surgical history and problem list.    Allergies   Allergen Reactions    Atorvastatin Other (See Comments)     LEG CRAMPS      Amoxicillin Rash    Escitalopram Rash    Nabumetone Rash    Niacin Er Rash    Penicillin G Rash    Penicillins Rash    Simvastatin Rash       Current Outpatient Medications:     acetaminophen (TYLENOL) 325 MG tablet, Take 2 tablets by mouth Every 6 (Six) Hours As Needed for Mild Pain (mild pain). Indications: Fever, Pain, Disp: , Rfl:     apixaban (Eliquis) 5 MG tablet tablet, Take 1 tablet by mouth 2 (Two) Times a Day., Disp: , Rfl:     Ascorbic Acid (Vitamin C) 500 MG capsule, Take 1 tablet by mouth Daily. Indications: Inadequate Vitamin C, Disp: , Rfl:     aspirin 81 MG EC tablet, Take 1 tablet by mouth Daily. Indications: Buildup of Plaques in Large Arteries Leading to the Brain, Disp: , Rfl:     carvedilol (COREG) 25 MG tablet, Take 1 tablet by mouth 2 (Two) Times a Day With Meals. Indications: High Blood Pressure Disorder, Disp: , Rfl:     Diclofenac Sodium (VOLTAREN) 1 % gel gel, Apply 4 g topically to the appropriate area as directed 4 (Four) Times a Day As Needed (Mild pain). Indications: Joint Damage causing " Pain and Loss of Function, Disp: , Rfl:     DULoxetine (CYMBALTA) 60 MG capsule, Take 1 capsule by mouth Daily. Indications: Musculoskeletal Pain, Disp: , Rfl:     estradiol (ESTRACE) 0.1 MG/GM vaginal cream, Insert 2 g into the vagina Daily., Disp: 42.5 g, Rfl: 0    ferrous sulfate 324 (65 Fe) MG tablet delayed-release EC tablet, Take 1 tablet by mouth Daily With Breakfast. Indications: Iron Deficiency, Disp: , Rfl:     folic acid (FOLVITE) 1 MG tablet, Take 1 tablet by mouth Daily. Indications: Anemia From Inadequate Folic Acid, Disp: , Rfl:     furosemide (LASIX) 40 MG tablet, Take 1 tablet by mouth Daily. Indications: Cardiac Failure, Edema, Disp: , Rfl:     isosorbide mononitrate (IMDUR) 60 MG 24 hr tablet, Take 1 tablet by mouth Daily. Indications: Stable Angina Pectoris, Disp: , Rfl:     leflunomide (ARAVA) 20 MG tablet, Take 1 tablet by mouth every night at bedtime. Indications: Rheumatoid Arthritis, Disp: 90 tablet, Rfl: 3    levothyroxine (SYNTHROID, LEVOTHROID) 75 MCG tablet, Take 1 tablet by mouth Daily. Indications: Underactive Thyroid, Disp: , Rfl:     lidocaine (Lidoderm) 5 %, Place 1 patch on the skin as directed by provider Daily As Needed for Mild Pain. Remove & Discard patch within 12 hours or as directed by MD, Disp: 30 each, Rfl: 2    losartan (COZAAR) 50 MG tablet, Take 1 tablet by mouth Daily. Indications: High Blood Pressure Disorder, Disp: , Rfl:     multivitamin with minerals tablet tablet, Take 1 tablet by mouth Daily., Disp:  , Rfl:     ondansetron (Zofran) 4 MG tablet, Take 1 tablet by mouth Every 8 (Eight) Hours As Needed for Nausea or Vomiting., Disp: 45 tablet, Rfl: 0    pantoprazole (Protonix) 40 MG EC tablet, Take 1 tablet by mouth Daily for 90 days., Disp: 90 tablet, Rfl: 0    predniSONE (DELTASONE) 5 MG tablet, Take 1 tablet by mouth Daily. Indications: Acute Joint Inflammation in Gout, Disp: , Rfl:     traMADol (ULTRAM) 50 MG tablet, Take 1 tablet by mouth Every 12 (Twelve) Hours  As Needed for Moderate Pain. Indications: Pain, Disp: , Rfl:     valACYclovir (VALTREX) 500 MG tablet, Take 1 tablet by mouth Daily As Needed. Indications: Shingles, Disp: , Rfl:     methenamine (HIPREX) 1 g tablet, Take 1 tablet by mouth 2 (Two) Times a Day With Meals for 90 days., Disp: 180 tablet, Rfl: 0    nitroglycerin (NITROSTAT) 0.4 MG SL tablet, Place 1 tablet under the tongue Every 5 (Five) Minutes As Needed for Chest Pain. Take no more than 3 doses in 15 minutes.  Indications: Acute Angina Pectoris (Patient not taking: Reported on 6/6/2024), Disp: , Rfl:     spironolactone (ALDACTONE) 25 MG tablet, Take 1 tablet by mouth Daily., Disp: 30 tablet, Rfl: 11  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    Elevated cholesterol     Eustachian tube dysfunction     Heart disease     Herpes simplex     History of transfusion     Hyperlipidemia     Hypertension     Hypothyroidism 12/23/2020    Intrinsic asthma 12/23/2020    Knee dislocation     Labral tear of right hip joint     Laryngitis sicca     Laryngitis, chronic     Left carotid bruit 03/09/2016    MI (myocardial infarction)     Myalgia due to statin 06/25/2019    Open wound of right hip 09/14/2021    Osteomyelitis of right femur 07/06/2021    Otorrhea     Pacemaker 11/17/2016    Primary osteoarthritis of left knee 12/23/2020    Psoriasis vulgaris 12/23/2020    S/P coronary artery stent placement 03/09/2016    Sensorineural hearing loss      "Seropositive rheumatoid arthritis of multiple sites 12/27/2019    Overview:  -myochrysine '93-'96 -methotrexate '96--->11/98;r/s  restarted 2/99--> 8/14 (anemia) -sulfasalazine- not effective -penicillamine 6/98-->10/98; no effect -leflunomide 11/98--> - Humira '13-->didn't take - Enbrel 12/14-->3/15- no effect!   Last Assessment & Plan:  - \"aching all over\" because she had to be off her anti-rheumatic drugs for 2 weeks in preparation for her R knee surgery - he    Sick sinus syndrome 12/27/2019    Sjogren's disease     Spondylolisthesis of lumbar region 01/17/2018    Syncope, recurrent 02/08/2021    Urinary tract infection     UTI (urinary tract infection) 04/19/2024       Social History     Socioeconomic History    Marital status:    Tobacco Use    Smoking status: Never     Passive exposure: Never    Smokeless tobacco: Never   Vaping Use    Vaping status: Never Used   Substance and Sexual Activity    Alcohol use: No    Drug use: Never    Sexual activity: Defer     Birth control/protection: Abstinence       Review of Systems   Constitutional: Negative for malaise/fatigue, weight gain and weight loss.   Cardiovascular:  Negative for chest pain, dyspnea on exertion, irregular heartbeat, leg swelling, near-syncope, orthopnea, palpitations, paroxysmal nocturnal dyspnea and syncope.   Respiratory:  Negative for cough, shortness of breath, sleep disturbances due to breathing, sputum production and wheezing.    Skin:  Negative for dry skin, flushing, itching and rash.   Gastrointestinal:  Negative for hematemesis and hematochezia.   Neurological:  Negative for dizziness, light-headedness, loss of balance and weakness.   All other systems reviewed and are negative.         Objective:     Vitals reviewed.   Constitutional:       General: Not in acute distress.     Appearance: Healthy appearance. Well-developed. Not diaphoretic.   Eyes:      General: No scleral icterus.     Conjunctiva/sclera: Conjunctivae normal.     " " Pupils: Pupils are equal, round, and reactive to light.   HENT:      Head: Normocephalic.    Mouth/Throat:      Pharynx: No oropharyngeal exudate.   Neck:      Vascular: No JVR.   Pulmonary:      Effort: Pulmonary effort is normal. No respiratory distress.      Breath sounds: Normal breath sounds. Examination of the right-lower field reveals rales. Examination of the left-lower field reveals rales. No wheezing. No rhonchi. Rales present.   Chest:      Chest wall: Not tender to palpatation.   Cardiovascular:      Normal rate. Regular rhythm.   Pulses:     Intact distal pulses.   Edema:     Peripheral edema absent.   Abdominal:      General: Bowel sounds are normal. There is no distension.      Palpations: Abdomen is soft.      Tenderness: There is no abdominal tenderness.   Musculoskeletal: Normal range of motion.      Cervical back: Normal range of motion and neck supple. Skin:     General: Skin is warm and dry.      Coloration: Skin is not pale.      Findings: No erythema or rash.   Neurological:      Mental Status: Alert, oriented to person, place, and time and oriented to person, place and time.      Deep Tendon Reflexes: Reflexes are normal and symmetric.   Psychiatric:         Behavior: Behavior normal.           Procedures  /60 (BP Location: Right arm, Patient Position: Sitting, Cuff Size: Adult)   Pulse 70   Ht 167.6 cm (66\")   Wt 95.3 kg (210 lb)   LMP  (LMP Unknown)   SpO2 94%   BMI 33.89 kg/m²     Lab Review:   I have reviewed previous office notes, recent labs and recent cardiac testing.   Results for orders placed during the hospital encounter of 02/09/24    Adult Stress Echo W/ Cont or Stress Agent if Necessary Per Protocol    Interpretation Summary    Abnormal stress echo with echocardiographic evidence for myocardial ischemia located in the lateral wall consistent with a high risk study for myocardial ischemia.    The following left ventricular wall segments are hypokinetic: mid " anterolateral, apical lateral and mid inferolateral.    Left ventricular systolic function is normal at rest.      Lab Results   Component Value Date    CHOL 171 05/25/2024    CHLPL 356 (H) 01/22/2024    TRIG 252 (H) 05/25/2024    HDL 23 (L) 05/25/2024     (H) 05/25/2024           Assessment:          Diagnosis Plan   1. Acute congestive heart failure, unspecified heart failure type  Adult Transthoracic Echo Complete w/ Color, Spectral and Contrast if necessary per protocol    Basic Metabolic Panel      2. Hypertensive heart disease with heart failure  Adult Transthoracic Echo Complete w/ Color, Spectral and Contrast if necessary per protocol      3. Coronary artery disease involving native coronary artery of native heart without angina pectoris        4. Essential hypertension        5. Hyperlipidemia LDL goal <70        6. Presence of cardiac pacemaker        7. Class 1 obesity due to excess calories with serious comorbidity and body mass index (BMI) of 33.0 to 33.9 in adult             Plan:       1/2. Acute CHF- presence of rales bilaterally, class 3 symptoms. possibly diastolic as she was hypertensive during her stay in the hospital. However given her high risk stress echo in Feb that she elected to postpone LHC as her symptoms were absent, I am concerned her EF has dropped due to ischemia. Obtain 2D echo. Start Aldactone 25mg daily. Pending echo results could consider addition of Jardiance or changing losartan to Entresto. Obtain BMP in 1 week prior to her follow up with Ximena. Of note, she mentions her Eliquis is costly therefore Entresto and Jardiance may be as well. Continue Losartan, Coreg and Lasix 40mg daily. Will consider stopping Lasix at follow up but recommend she continue for now as she sounds slightly volume overloaded on exam.   3. CAD- denies ischemic symptoms but had a high risk stress echo in Feb. S/p PCI in 2010 and 2013. Obtaining 2D echo to evaluate EF and if reduced, could consider  proceeding with [facility]. Continue Eliquis, BB, ARB, and imdur. Was on rapatha which is no longer on her medication list. Will follow up with pharmacy to see if she is taking it.   4. HTN- controlled. Was elevated while inpatient. Continue losartan and Coreg.   5. HLD- uncontrolled with  in May. Was started on Rapatha following her visit with Ximena in January. Will follow up with pharmacy to see if she is taking it.   6. Pacemaker- normal device function per interrogation in April. Will check with device nurse to make sure there has been no evidence of arrhythmia that could have attributed to decompensated CHF.    7. BMI- BMI is >= 30 and <35. (Class 1 Obesity). The following options were offered after discussion;: weight loss educational material (shared in after visit summary)        Keep follow up next week with Ximena or sooner if symptoms worsen.     I spent 30 minutes caring for Tawny on this date of service. This time includes time spent by me in the following activities:preparing for the visit, reviewing tests, obtaining and/or reviewing a separately obtained history, performing a medically appropriate examination and/or evaluation , counseling and educating the patient/family/caregiver, ordering medications, tests, or procedures, and documenting information in the medical record    Current outpatient and discharge medications have been reconciled for the patient.  Reviewed by: DANIELA Douglas

## 2024-06-06 NOTE — TELEPHONE ENCOUNTER
Patient has not transmitted remotely in 2024; however, her device was checked 4.23.24 when she was hospitalized.  RN left a message for patient requesting she send a report.  RN's direct line provided for any questions or concerns.

## 2024-06-06 NOTE — TELEPHONE ENCOUNTER
----- Message from Anamaria White sent at 6/6/2024 12:52 PM CDT -----  Can you check remote monitoring and make sure she has not had anything funny going on? She was admitted to the hospital last week with CHF. Just wanted to make sure she had no afib or anything.     Thanks.

## 2024-06-07 ENCOUNTER — HOME CARE VISIT (OUTPATIENT)
Dept: HOME HEALTH SERVICES | Facility: CLINIC | Age: 69
End: 2024-06-07
Payer: MEDICARE

## 2024-06-07 VITALS — HEART RATE: 74 BPM | TEMPERATURE: 97.5 F | OXYGEN SATURATION: 93 %

## 2024-06-07 PROCEDURE — G0493 RN CARE EA 15 MIN HH/HOSPICE: HCPCS

## 2024-06-07 NOTE — HOME HEALTH
OT EVALUATION    REASON FOR REFERRAL:Patient referred due to 2 recent hospital stays and decline in strength and ADL's.     PRIMARY DIAGNOSIS- hypertensive heart and CKD    SECONDARY DIAGNOSIS- RA, a-fib    SURGICAL PROCEDURES- no    PREVIOUS OCCUPATIONAL THERAPY- yes     OXYGEN USE- no    CLINICAL FINDINGS:  WOUND / SKIN CONDITION- no issues  EDEMA- B LE  EQUIPMENT : BSC, walk in shower, shower chair, hospital bed, hand held shower, power wheelchair  DRIVING- no    FUNCTIONAL ENDURANCE FOR SAFE ACTIVITIES OF DAILY LIVING / INSTRUMENTAL ACTIVITIES OF DAILY LIVING: poor    WEIGHT BEARING STATUS/PRECAUTIONS- WBAT -but weakness prevents standing    ASSISTIVE DEVICE- power wheelchair     ACTIVITIES OF DAILY LIVING MOBILITY/FALLS RISK ASSESSMENT- at risk for falls due to weakness, impaired gait and balance, dependence on AD    MEDICAL NECESSITY- Skilled OT medically necessary to address UB strength and ADL independence.     PATIENT GOAL FOR THIS EPISODE OF CARE- to get back to outpatient therapy    TODAY'S INTERVENTIONS- Skill/education provided: Patient/caregiver response: Initial eval completed. Goals and POC discussed with patient and caregiver and ADL training initiated. Patient and caregiver educated and assisted on how to wash hair by reclining power wheelchair at sink in kitchen. Patient and caregiver completed with Yesenia. Patient is considerably weak and required max A to transfer from EOB to power wheelchair. Patient and caregiver educated to lower bed for transfer back to bed from power chair and to use gait belt. Patient and caregiver verbalized understanding.    REHAB POTENTIAL- good for stated goals  DATE OF NEXT APPOINTMENT WITH DOCTOR- pending   PATIENT / CAREGIVER AGREE WITH DISCHARGE PLAN- yes  COMMUNICATION / CARE COORDINATION- Staff re: OT eval.  PLAN FOR NEXT VISIT: Introduce HEP

## 2024-06-07 NOTE — HOME HEALTH
FOCUS OF CARE/SKILLED NEED: CHF, WOUND CARE to right gluteal    TEACHING/INTERVENTIONS: wound to right gluteal worsening, increased size and depth of wound.  Patient to see MD on Monday.  New wound care orders received.  Wound care provided, patient tolerated.  Instructed caregiver on wound care with caregiver verbalizing understanding.  instructed on diet choices, fall prevention techniques with postive understanding noted.    PROGRESS TOWARD GOALS: ongoing    PHYSICIAN CONTACT: yes    INSURANCE CHANGES? none    MEDICATION CHANGES SINCE LAST VISIT? none    PLANS FOR NEXT VISIT: chf disease management, skin care, wound care    PRE-SCREEN FOR COVID? YES, NO S/S OF COVID, NO RECENT EXPOSURES none expressed

## 2024-06-10 ENCOUNTER — HOME CARE VISIT (OUTPATIENT)
Dept: HOME HEALTH SERVICES | Facility: CLINIC | Age: 69
End: 2024-06-10
Payer: MEDICARE

## 2024-06-10 ENCOUNTER — OFFICE VISIT (OUTPATIENT)
Dept: INTERNAL MEDICINE | Facility: CLINIC | Age: 69
End: 2024-06-10
Payer: MEDICARE

## 2024-06-10 VITALS
TEMPERATURE: 97.5 F | SYSTOLIC BLOOD PRESSURE: 130 MMHG | OXYGEN SATURATION: 91 % | DIASTOLIC BLOOD PRESSURE: 62 MMHG | HEIGHT: 66 IN | BODY MASS INDEX: 33.89 KG/M2 | HEART RATE: 79 BPM

## 2024-06-10 VITALS
DIASTOLIC BLOOD PRESSURE: 82 MMHG | HEART RATE: 70 BPM | TEMPERATURE: 97.7 F | SYSTOLIC BLOOD PRESSURE: 159 MMHG | OXYGEN SATURATION: 94 % | RESPIRATION RATE: 18 BRPM

## 2024-06-10 DIAGNOSIS — M06.9 RHEUMATOID ARTHRITIS INVOLVING MULTIPLE SITES, UNSPECIFIED WHETHER RHEUMATOID FACTOR PRESENT: ICD-10-CM

## 2024-06-10 DIAGNOSIS — S31.819D OPEN WOUND OF RIGHT BUTTOCK, SUBSEQUENT ENCOUNTER: Primary | ICD-10-CM

## 2024-06-10 DIAGNOSIS — G82.20 LOWER PARAPLEGIA: ICD-10-CM

## 2024-06-10 DIAGNOSIS — E78.5 HYPERLIPIDEMIA LDL GOAL <70: ICD-10-CM

## 2024-06-10 DIAGNOSIS — I50.32 CHRONIC HEART FAILURE WITH PRESERVED EJECTION FRACTION: ICD-10-CM

## 2024-06-10 DIAGNOSIS — R78.81 BACTEREMIA: ICD-10-CM

## 2024-06-10 DIAGNOSIS — I10 ESSENTIAL HYPERTENSION: Chronic | ICD-10-CM

## 2024-06-10 DIAGNOSIS — N18.32 STAGE 3B CHRONIC KIDNEY DISEASE: ICD-10-CM

## 2024-06-10 PROCEDURE — 1111F DSCHRG MED/CURRENT MED MERGE: CPT | Performed by: INTERNAL MEDICINE

## 2024-06-10 PROCEDURE — 3075F SYST BP GE 130 - 139MM HG: CPT | Performed by: INTERNAL MEDICINE

## 2024-06-10 PROCEDURE — 3078F DIAST BP <80 MM HG: CPT | Performed by: INTERNAL MEDICINE

## 2024-06-10 PROCEDURE — G0157 HHC PT ASSISTANT EA 15: HCPCS

## 2024-06-10 PROCEDURE — 99495 TRANSJ CARE MGMT MOD F2F 14D: CPT | Performed by: INTERNAL MEDICINE

## 2024-06-10 PROCEDURE — 1125F AMNT PAIN NOTED PAIN PRSNT: CPT | Performed by: INTERNAL MEDICINE

## 2024-06-10 NOTE — PROGRESS NOTES
"    CC: hospital follow-up for CHF    History:  Tawny Shin is a 68 y.o. female who presents today for transitional care management after hospitalization.    Date of Discharge: 5/31/2024  TCM call successfully was not made.  The patient was attempted 3 times without success.      From discharge summary:  \"Presenting Problem/History of Present Illness:  Patient is a 68 years old presented ER with complaint generalized weakness for last 2 weeks.  Complains of swollen legs and shortness of breath.  Patient required 2 L of oxygen in ER.  Patient stated breathing is worse when laying flat.  Patient denies any abdomen pain or chest pain.  Patient denies nausea vomiting or diarrhea symptoms.  Patient denies any fever night sweats chills .     Blood pressure slightly high, afebrile.  Patient is on 2 L nasal cannula.  BNP 5000.  Creatinine 1.08.  D-dimer is 2.4.  CT shows no DVT.  Platelet counts 115.  Hemoglobin 8.2.  UA positive for 2+ bacteria.     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 alphaherpesvirinae, elevated cholesterol, eustachian tube dysfunction, heart disease, herpes simplex, transfusion, hyperlipidemia, hypertension, hypothyroidism, intrinsic asthma, knee dislocation, labral tear of the right hip joint, laryngitis discussed, left carotid bruit, MI, myalgia due to statin, vasculitis of right femur, otorrhea, pacemaker, osteoarthritis, psoriasis vulgaris, status post stent placement, sensory neural hearing loss, recurrent UTI,  sjogren, sick sinus syndrome\"      \"Final Discharge Diagnoses:  Sepsis with Proteus bacteremia secondary to Proteus UTI, without organ dysfunction   Dysphagia due to partially obstructing benign " esophageal stricture, status post balloon dilation  Gallstone with no sign of cholecystitis  Chronic (HFpEF) heart failure with preserved ejection fraction       Active Hospital Problems     Diagnosis      **Fluid overload      Dysphagia      Abnormal CT of the abdomen      Hypothyroidism      Paralysis      History of urinary retention      Cholelithiasis      Essential hypertension      Coronary artery disease involving native coronary artery of native heart without angina pectoris      Sick sinus syndrome      Venous insufficiency      Overweight (BMI 25.0-29.9)      Rheumatoid arthritis involving multiple sites      (HFpEF) heart failure with preserved ejection fraction      Chronic anticoagulation      Iron deficiency anemia      Osteoporosis      Chronic embolism and thrombosis of unspecified deep veins of left lower extremity      T12 compression fracture, initial encounter        Consults:   Dr Tad Jones, GI  Dr Elie Ohara, ID  Dr Fidel Mccullough, cardiology     Procedures Performed:   EGD 5/3/2024 Dr Sanchez  Esophagus: Distal esophagus with multilevel benign-appearing circumferential narrowing occupying extending one fourth of the esophagus to the GE junction consistent with a partially obstructing benign esophageal stricture.  No ulcerations suggestive of active ulcerative esophagitis.  Wire-guided TTS/CRE balloon dilation at 12 mm without effect increasing incrementally to 16.5 and 18 mm noting several areas of mucosal tearing circumferentially in the distal third of the esophagus in addition to areas of stretched mucosa.  Additional disruptive dilation biopsies performed at the GE junction over a normal Z-line in addition to distal esophagus.  Hemostasis observed.     Stomach: Scattered erythema without any concerning ulcer/masses.     Duodenum: Normal first and second portion mucosa.  No biopsies obtained.     Pertinent Test Results:   Results for orders placed during the hospital encounter of  "02/09/24     Adult Stress Echo W/ Cont or Stress Agent if Necessary Per Protocol     Interpretation Summary    Abnormal stress echo with echocardiographic evidence for myocardial ischemia located in the lateral wall consistent with a high risk study for myocardial ischemia.    The following left ventricular wall segments are hypokinetic: mid anterolateral, apical lateral and mid inferolateral.    Left ventricular systolic function is normal at rest.\"      \"Hospital Course:   Shortness of breath.  Vascular congestion.  Patient received 80 of Lasix in ER. Improved.  Chest x-ray-Low lung volumes. Stable cardiomegaly with similar positioning of the  left subclavian cardiac pacer leads- Vascular crowding versus mild venous congestion,  No consolidation.  CT of the chest-Small bilateral pleural fluid and small layering groundglass opacity  at the fissures- could represent small pulmonary edema/fluid or atelectasis, Gallstone at the proximal gallbladder neck or cystic duct- Partially visualized gallbladder appears distended- not optimally evaluated on this exam, Diffuse esophageal thickening of the mid and distal esophagus- is new compared to 4/22/2022, recommend referral to gastroenterology for consideration of direct visualization.  Patient is on room air.     CAD/hypertension/hyperlipidemia/pacemaker due to history of sick sinus syndrome.  Aspirin. .  BNP 5000 in ER.  Patient received 80 of Lasix in the ER.  Imdur .  Continue Cozaar.  Nitro as needed.  Coreg.  Daily weight.  Strict in and out.    Allergic to statin  History of positive stress test 2/9/24    Abnormal stress echo with echocardiographic evidence for myocardial ischemia located in the lateral wall consistent with a high risk study for myocardial ischemia.    The following left ventricular wall segments are hypokinetic: mid anterolateral, apical lateral and mid inferolateral.    Left ventricular systolic function is normal at rest.  Consulted cardiology for " "recommendations and EGD clearance, see consult for recommendations.     Gallstone proximal gallbladder neck and cystic duct/diffuse esophageal thickening of mid and distal esophagus. GI consult input noted  Status post EGD with findings of esophageal stricture partially dilated with improved symptoms.  No bleeding issues noted.  Recommended to start Eliquis on Saturday.  Ultrasound the gallbladder-Gallstone with no sign of cholecystitis.   Dysphagia/CT noted thickened mid/distal esophagus  GI recommendation-no additional workup for cholelithiasis until patients become symptomatic, dysphagia for last couple months-EGD performed with finding of esophageal stricture status post balloon dilatation with good results. Recommendations:  -Follow-up biopsies  -GI soft diet  -Pantoprazole 40 mg p.o. daily 30 minutes before breakfast  -Follow-up in the GI office in 2 to 4 weeks for reevaluation.  Consider repeat EGD with savory dilation under fluoroscopic guidance with goal of maximum dilation of 20 mm.  -Ideally restart Eliquis on Saturday a.m. if possible.  If absolutely needs to be restarted sooner that would be okay as well.  Will watch for signs of bleeding.     UTI and bacteremia secondary to Proteus      Infect disease consult input noted.   Cefepime narrowed to Rocephin and treatment completed.  Considered additional oral therapy but elected not to based on bacteremia with known source of urinary tract infection otherwise not complicated.   \"reduce recurrent infections by drinking approximately 2 L of water a day  intravaginal estrogen  consideration for methenamine hippurate  History of CRE.      Chronic stage IIIb renal failure.  Creatinine increased to 1.52, now 1.14, probably secondary to Lasix one-time dose in ER 80 mg.     Rheumatoid arthritis/Hx Sjogren .  Biotene.  Magic mouthwash. Arava.  Prednisone daily.     History of DVT . Eliquis on hold for EGD.\"            ROS:  Review of Systems    Ms. Shin  reports " that she has never smoked. She has never been exposed to tobacco smoke. She has never used smokeless tobacco. She reports that she does not drink alcohol and does not use drugs.    No current facility-administered medications for this visit.  No current outpatient medications on file.    Facility-Administered Medications Ordered in Other Visits:     acetaminophen (TYLENOL) tablet 650 mg, 650 mg, Oral, Q6H PRN, Wes Andrews MD    apixaban (ELIQUIS) tablet 5 mg, 5 mg, Oral, BID, Wes Andrews MD, 5 mg at 06/13/24 0813    ascorbic acid (VITAMIN C) tablet 500 mg, 500 mg, Oral, Daily, Wes Andrews MD, 500 mg at 06/13/24 0813    aspirin EC tablet 81 mg, 81 mg, Oral, Daily, Wes Andrews MD, 81 mg at 06/13/24 0812    sennosides-docusate (PERICOLACE) 8.6-50 MG per tablet 2 tablet, 2 tablet, Oral, BID PRN **AND** polyethylene glycol (MIRALAX) packet 17 g, 17 g, Oral, Daily PRN **AND** bisacodyl (DULCOLAX) EC tablet 5 mg, 5 mg, Oral, Daily PRN **AND** bisacodyl (DULCOLAX) suppository 10 mg, 10 mg, Rectal, Daily PRN, Wes Andrews MD    Calcium Replacement - Follow Nurse / BPA Driven Protocol, , Does not apply, PRN, Wes Andrews MD    carvedilol (COREG) tablet 25 mg, 25 mg, Oral, BID With Meals, Wes Andrews MD, 25 mg at 06/13/24 0813    cefTRIAXone (ROCEPHIN) 2,000 mg in sodium chloride 0.9 % 100 mL MBP, 2,000 mg, Intravenous, Q24H, Wes Andrews MD, Last Rate: 200 mL/hr at 06/13/24 1119, 2,000 mg at 06/13/24 1119    Diclofenac Sodium (VOLTAREN) 1 % gel 4 g, 4 g, Topical, 4x Daily PRN, Wes Andrews MD    DULoxetine (CYMBALTA) DR capsule 60 mg, 60 mg, Oral, Daily, Wes Andrews MD, 60 mg at 06/13/24 0813    estradiol (ESTRACE) vaginal cream 2 applicator, 2 g, Vaginal, Daily, Wes Andrews MD, 2 applicator at 06/13/24 0814    ferrous sulfate tablet 325 mg, 325 mg, Oral, Daily With Breakfast, Wes Andrews MD, 325 mg at 06/13/24 0814    folic acid  (FOLVITE) tablet 1,000 mcg, 1,000 mcg, Oral, Daily, Wes Andrews MD, 1,000 mcg at 06/13/24 0813    furosemide (LASIX) tablet 40 mg, 40 mg, Oral, Daily, Wes Andrews MD, 40 mg at 06/13/24 0813    isosorbide mononitrate (IMDUR) 24 hr tablet 60 mg, 60 mg, Oral, Daily, Wes Andrews MD, 60 mg at 06/13/24 0814    leflunomide (ARAVA) tablet 20 mg, 20 mg, Oral, Daily, Wes Andrews MD, 20 mg at 06/13/24 0814    levothyroxine (SYNTHROID, LEVOTHROID) tablet 75 mcg, 75 mcg, Oral, Q AM, Wes Andrews MD, 75 mcg at 06/13/24 0518    Lidocaine 4 % 1 patch, 1 patch, Transdermal, Daily PRN, Wes Andrews MD    losartan (COZAAR) tablet 50 mg, 50 mg, Oral, Daily, Wes Andrews MD, 50 mg at 06/13/24 0815    Magnesium Low Dose Replacement - Follow Nurse / BPA Driven Protocol, , Does not apply, PRN, Wes Andrews MD    magnesium sulfate in D5W 1g/100mL (PREMIX), 1 g, Intravenous, Q1H, Kris Cade MD, 1 g at 06/13/24 1208    methylPREDNISolone sodium succinate (SOLU-Medrol) injection 40 mg, 40 mg, Intravenous, Q24H, Wes Andrews MD    Morphine sulfate (PF) injection 1 mg, 1 mg, Intravenous, Q4H PRN **AND** naloxone (NARCAN) injection 0.4 mg, 0.4 mg, Intravenous, Q5 Min PRN, Wes Andrews MD    multivitamin with minerals 1 tablet, 1 tablet, Oral, Daily, Wes Andrews MD, 1 tablet at 06/13/24 0814    nitroglycerin (NITROSTAT) SL tablet 0.4 mg, 0.4 mg, Sublingual, Q5 Min PRN, Wes Andrews MD    ondansetron (ZOFRAN) injection 4 mg, 4 mg, Intravenous, Q6H PRN, Wes Andrews MD    pantoprazole (PROTONIX) EC tablet 40 mg, 40 mg, Oral, Daily, Wes Andrews MD, 40 mg at 06/13/24 0815    PATIENT SUPPLIED MEDICATION, 1 g, Oral, BID, Wes Andrews MD    Phosphorus Replacement - Follow Nurse / BPA Driven Protocol, , Does not apply, PRN, Wes Andrews MD    Potassium Replacement - Follow Nurse / BPA Driven Protocol, , Does not apply, PRN,  "Wes Andrews MD    sodium chloride 0.9 % flush 10 mL, 10 mL, Intravenous, PRN, Calvin Booth DO, 10 mL at 06/12/24 2200    sodium chloride 0.9 % flush 10 mL, 10 mL, Intravenous, Q12H, Wes Andrews MD, 10 mL at 06/13/24 0815    sodium chloride 0.9 % flush 10 mL, 10 mL, Intravenous, PRN, Wes Andrews MD    sodium chloride 0.9 % infusion 40 mL, 40 mL, Intravenous, PRN, Wes Andrews MD    spironolactone (ALDACTONE) tablet 25 mg, 25 mg, Oral, Daily, Wes Andrews MD, 25 mg at 06/13/24 0816    traMADol (ULTRAM) tablet 50 mg, 50 mg, Oral, Q12H PRN, Wes Andrews MD      OBJECTIVE:  /62 (BP Location: Left arm, Patient Position: Sitting, Cuff Size: Adult)   Pulse 79   Temp 97.5 °F (36.4 °C) (Infrared)   Ht 167.6 cm (66\")   LMP  (LMP Unknown)   SpO2 91%   BMI 33.89 kg/m²    Physical Exam  Vitals and nursing note reviewed.   Constitutional:       Appearance: She is obese. She is not ill-appearing.   Eyes:      General: No scleral icterus.     Conjunctiva/sclera: Conjunctivae normal.   Pulmonary:      Effort: Pulmonary effort is normal. No respiratory distress.   Skin:     General: Skin is warm.      Coloration: Skin is not pale.             Comments: Open  Granulation tissue  Minimal undermining   Neurological:      General: No focal deficit present.      Mental Status: She is alert and oriented to person, place, and time.   Psychiatric:         Mood and Affect: Mood normal.         Behavior: Behavior normal.                          Assessment/Plan    Diagnoses and all orders for this visit:    1. Open wound of right buttock, subsequent encounter (Primary)  -     Ambulatory Referral to Home Health    2. Chronic heart failure with preserved ejection fraction    3. Essential hypertension    4. Hyperlipidemia LDL goal <70    5. Bacteremia    6. Rheumatoid arthritis involving multiple sites, unspecified whether rheumatoid factor present    7. Near functional " paraplegia    8. Stage 3b chronic kidney disease        I have reviewed the discharge summary and other pertinent hospital records.   The patient does not have any pending diagnostic tests or treatments.  There have not been any problems with coordination of care.  Medication reconciliation has been completed during today's visit with the information available to us.      Patient has had follow-up cardiology.  The patient was encouraged to continue taking her diuretics and they also added spironolactone.  I am going to plan on contacting home health to get a BMP and magnesium within the next couple of weeks.    Recommended compression socks.  Recommended daily weights if able.  Recommend ambulatory blood pressure monitoring.    Patient had EGD with dilation.  Patient swallowing better.    Patient is not having any urinary symptoms at the present time.    Patient has an open wound on her right buttock.  I recommend packing this with 4 x 4 gauze wet-to-dry.  Change twice daily.  We will add wound care onto her home health.    This wound does not appear infected.    I have debated on putting the patient on something for prophylactic for UTI.  However I am unsure how helpful this will be given that the patient's bugs are all resistant to oral antibiotics.  May be worthwhile to touch base with infectious disease at some point in time to see if there is anything that we can do for prevention.  In the interim I have encouraged the patient to make sure to stay hydrated, if she is using pads or depends to change these frequently.      Result Review :  The following data was reviewed by: Moises Oliveira MD on 06/10/2024:  CMP          5/31/2024    02:52 6/12/2024    21:12 6/13/2024    04:41   CMP   Glucose 134  132  155    BUN 12  19  18    Creatinine 0.77  1.13  1.09    EGFR 83.1  52.4  54.8    Sodium 141  138  138    Potassium 4.3  3.7  4.0    Chloride 108  100  102    Calcium 8.1  8.5  8.1    Total Protein  7.0      Albumin  3.3     Globulin  3.7     Total Bilirubin  0.5     Alkaline Phosphatase  77     AST (SGOT)  27     ALT (SGPT)  23     Albumin/Globulin Ratio  0.9     BUN/Creatinine Ratio 15.6  16.8  16.5    Anion Gap 9.0  12.0  11.0      CBC          5/30/2024    04:13 5/31/2024    02:52 6/12/2024    21:12   CBC   WBC 6.11  8.93  8.62    RBC 2.81  2.85  3.10    Hemoglobin 8.0  8.2  8.9    Hematocrit 27.6  27.8  29.3    MCV 98.2  97.5  94.5    MCH 28.5  28.8  28.7    MCHC 29.0  29.5  30.4    RDW 17.5  17.8  18.7    Platelets 187  220  164      CBC w/diff          5/30/2024    04:13 5/31/2024    02:52 6/12/2024    21:12   CBC w/Diff   WBC 6.11  8.93  8.62    RBC 2.81  2.85  3.10    Hemoglobin 8.0  8.2  8.9    Hematocrit 27.6  27.8  29.3    MCV 98.2  97.5  94.5    MCH 28.5  28.8  28.7    MCHC 29.0  29.5  30.4    RDW 17.5  17.8  18.7    Platelets 187  220  164    Neutrophil Rel %   85.0    Immature Granulocyte Rel %   1.6    Lymphocyte Rel %   8.5    Monocyte Rel %   4.6    Eosinophil Rel %   0.0    Basophil Rel %   0.3      TSH          1/22/2024    09:17 5/7/2024    07:20 5/25/2024    05:37   TSH   TSH 2.180  8.870  2.000      BMP          5/31/2024    02:52 6/12/2024    21:12 6/13/2024    04:41   BMP   BUN 12  19  18    Creatinine 0.77  1.13  1.09    Sodium 141  138  138    Potassium 4.3  3.7  4.0    Chloride 108  100  102    CO2 24.0  26.0  25.0    Calcium 8.1  8.5  8.1      A1C Last 3 Results          1/22/2024    09:17 5/25/2024    05:37   HGBA1C Last 3 Results   Hemoglobin A1C 6.10  6.20        UA          5/9/2024    01:39 5/24/2024    13:12 6/12/2024    21:17   Urinalysis   Squamous Epithelial Cells, UA 0-2  None Seen  3-6    Specific Gravity, UA 1.025  1.008  1.015    Ketones, UA Trace  Negative  Negative    Blood, UA Moderate (2+)  Moderate (2+)  Moderate (2+)    Leukocytes, UA Moderate (2+)  Large (3+)  Large (3+)    Nitrite, UA Negative  Positive  Positive    RBC, UA 6-10  0-2  3-5    WBC, UA 6-10  Too Numerous  to Count  Too Numerous to Count    Bacteria, UA Trace  2+  3+      Urine Culture          5/2/2024    19:56 5/9/2024    01:39 5/24/2024    13:12   Urine Culture   Urine Culture >100,000 CFU/mL Enterobacter cloacae complex CRE  No growth  >100,000 CFU/mL Proteus mirabilis          Latest Reference Range & Units 05/24/24 10:51 05/24/24 11:06 05/24/24 11:10 05/24/24 13:12   BCID, PCR Negative by BCID PCR. Culture to Follow.  Proteus spp. Identification by BCID2 PCR. !      BLOOD CULTURE  Rpt !!  Rpt    BLOOD CULTURE ID  Rpt !      URINE CULTURE     Rpt !   COVID-19 AND FLU A/B, NP SWAB IN TRANSPORT MEDIA 1 HR TAT   Rpt     !!: Data is critical  !: Data is abnormal  Rpt: View report in Results Review for more information    An After Visit Summary was printed and given to the patient at discharge.  Return if symptoms worsen or fail to improve, for follow up for above problems. Longitudinal care., Next scheduled follow up. Sooner if problems arise.         PHILL Oliveira MD, [facility], FACP  Electronically signed by Moises Oliveira MD, 06/10/24, 5:38 PM CDT.

## 2024-06-10 NOTE — HOME HEALTH
SUBJECTIVE: Patient rates pain a 4/10 at incision site with prolonged sitting. Patient's caregiver states patient has been able to transfer over the weekend and this morning by pivoting over with support of walker.     SIGNS AND SYMPTOMS OF COVID: NO     FALLS: NO    INSURANCE CHANGES: NO    MEDICATION CHANGES: NO    EDEMA: 1+ B LE's    SKILLS/EDUCATION and RESPONSE: Patient was able to transfers today and perform prolonged standing for brief periods of time today. She is limited to how long due to pain and weakness but she has progressed since last week.    PLAN: Continue with focus on standing, transfers and strength.

## 2024-06-11 ENCOUNTER — TELEPHONE (OUTPATIENT)
Dept: INTERNAL MEDICINE | Facility: CLINIC | Age: 69
End: 2024-06-11

## 2024-06-11 NOTE — TELEPHONE ENCOUNTER
Called [facility] with information below - spoke with Darell since Tiffanie was gone for the day. HH voiced understanding.

## 2024-06-11 NOTE — TELEPHONE ENCOUNTER
Caller: [facility]    Relationship:     Best call back number: [phone]     What is the best time to reach you: ANYTIME    Who are you requesting to speak with (clinical staff, provider,  specific staff member): DR. CANNON    Do you know the name of the person who called: ROCKY ORTEGA    What was the call regarding: ROCKY STATED PATIENT SEEN DR. CANNON YESTERDAY 06.10.2024 AND  HE ORDERED HOME CARE. ROCKY STATED PATIENT WAS ALREADY DOING HOME CARE. THEY ARE DOING RESUMPTION OF CARE.     ALSO, ROCKY STATED IN THE ORDER  IT STATED PER WOUND CARE WET TO DRY WITH SALINE TO OPEN WOUND ON RIGHT BUTTOCK. SHE NEEDS FURTHER INSTRUCTION ON THE WOUND SUCH AS FREQUENCY.      PLEASE CALL TO ADVISE

## 2024-06-12 ENCOUNTER — APPOINTMENT (OUTPATIENT)
Dept: GENERAL RADIOLOGY | Facility: HOSPITAL | Age: 69
DRG: 659 | End: 2024-06-12
Payer: MEDICARE

## 2024-06-12 ENCOUNTER — HOSPITAL ENCOUNTER (INPATIENT)
Facility: HOSPITAL | Age: 69
LOS: 17 days | Discharge: NURSING, REHAB OR LONG-TERM CARE | DRG: 659 | End: 2024-07-08
Attending: EMERGENCY MEDICINE | Admitting: INTERNAL MEDICINE
Payer: MEDICARE

## 2024-06-12 ENCOUNTER — HOME CARE VISIT (OUTPATIENT)
Dept: HOME HEALTH SERVICES | Facility: CLINIC | Age: 69
End: 2024-06-12
Payer: MEDICARE

## 2024-06-12 ENCOUNTER — APPOINTMENT (OUTPATIENT)
Dept: CT IMAGING | Facility: HOSPITAL | Age: 69
DRG: 659 | End: 2024-06-12
Payer: MEDICARE

## 2024-06-12 ENCOUNTER — TELEPHONE (OUTPATIENT)
Dept: INTERNAL MEDICINE | Facility: CLINIC | Age: 69
End: 2024-06-12
Payer: MEDICARE

## 2024-06-12 ENCOUNTER — HOSPITAL ENCOUNTER (OUTPATIENT)
Dept: CARDIOLOGY | Facility: HOSPITAL | Age: 69
Discharge: HOME | End: 2024-06-12
Admitting: NURSE PRACTITIONER
Payer: MEDICARE

## 2024-06-12 VITALS
BODY MASS INDEX: 33.75 KG/M2 | HEIGHT: 66 IN | SYSTOLIC BLOOD PRESSURE: 130 MMHG | DIASTOLIC BLOOD PRESSURE: 62 MMHG | WEIGHT: 210 LBS

## 2024-06-12 VITALS
SYSTOLIC BLOOD PRESSURE: 150 MMHG | HEART RATE: 68 BPM | TEMPERATURE: 100.5 F | OXYGEN SATURATION: 95 % | DIASTOLIC BLOOD PRESSURE: 80 MMHG | RESPIRATION RATE: 18 BRPM

## 2024-06-12 DIAGNOSIS — M10.9 ACUTE GOUT, UNSPECIFIED CAUSE, UNSPECIFIED SITE: ICD-10-CM

## 2024-06-12 DIAGNOSIS — S31.819S: ICD-10-CM

## 2024-06-12 DIAGNOSIS — Z74.09 IMPAIRED MOBILITY: ICD-10-CM

## 2024-06-12 DIAGNOSIS — N39.0 ACUTE UTI (URINARY TRACT INFECTION): Primary | ICD-10-CM

## 2024-06-12 DIAGNOSIS — R13.10 DYSPHAGIA, UNSPECIFIED TYPE: ICD-10-CM

## 2024-06-12 DIAGNOSIS — K81.0 ACUTE CHOLECYSTITIS: ICD-10-CM

## 2024-06-12 DIAGNOSIS — N13.2 HYDRONEPHROSIS WITH URINARY OBSTRUCTION DUE TO URETERAL CALCULUS: ICD-10-CM

## 2024-06-12 DIAGNOSIS — R41.82 ALTERED MENTAL STATUS, UNSPECIFIED ALTERED MENTAL STATUS TYPE: ICD-10-CM

## 2024-06-12 DIAGNOSIS — I50.9 ACUTE CONGESTIVE HEART FAILURE, UNSPECIFIED HEART FAILURE TYPE: ICD-10-CM

## 2024-06-12 DIAGNOSIS — I11.0 HYPERTENSIVE HEART DISEASE WITH HEART FAILURE: ICD-10-CM

## 2024-06-12 DIAGNOSIS — L72.9 CYST OF BUTTOCKS: ICD-10-CM

## 2024-06-12 LAB
ALBUMIN SERPL-MCNC: 3.3 G/DL (ref 3.5–5.2)
ALBUMIN/GLOB SERPL: 0.9 G/DL
ALP SERPL-CCNC: 77 U/L (ref 39–117)
ALT SERPL W P-5'-P-CCNC: 23 U/L (ref 1–33)
AMPHET+METHAMPHET UR QL: NEGATIVE
AMPHETAMINES UR QL: NEGATIVE
ANION GAP SERPL CALCULATED.3IONS-SCNC: 12 MMOL/L (ref 5–15)
AST SERPL-CCNC: 27 U/L (ref 1–32)
BACTERIA UR QL AUTO: ABNORMAL /HPF
BARBITURATES UR QL SCN: NEGATIVE
BASOPHILS # BLD AUTO: 0.03 10*3/MM3 (ref 0–0.2)
BASOPHILS NFR BLD AUTO: 0.3 % (ref 0–1.5)
BENZODIAZ UR QL SCN: NEGATIVE
BILIRUB SERPL-MCNC: 0.5 MG/DL (ref 0–1.2)
BILIRUB UR QL STRIP: NEGATIVE
BUN SERPL-MCNC: 19 MG/DL (ref 8–23)
BUN/CREAT SERPL: 16.8 (ref 7–25)
BUPRENORPHINE SERPL-MCNC: NEGATIVE NG/ML
CALCIUM SPEC-SCNC: 8.5 MG/DL (ref 8.6–10.5)
CANNABINOIDS SERPL QL: NEGATIVE
CHLORIDE SERPL-SCNC: 100 MMOL/L (ref 98–107)
CLARITY UR: ABNORMAL
CO2 SERPL-SCNC: 26 MMOL/L (ref 22–29)
COCAINE UR QL: NEGATIVE
COLOR UR: YELLOW
CREAT SERPL-MCNC: 1.13 MG/DL (ref 0.57–1)
D-LACTATE SERPL-SCNC: 1 MMOL/L (ref 0.5–2)
DEPRECATED RDW RBC AUTO: 63.2 FL (ref 37–54)
EGFRCR SERPLBLD CKD-EPI 2021: 52.4 ML/MIN/1.73
EOSINOPHIL # BLD AUTO: 0 10*3/MM3 (ref 0–0.4)
EOSINOPHIL NFR BLD AUTO: 0 % (ref 0.3–6.2)
ERYTHROCYTE [DISTWIDTH] IN BLOOD BY AUTOMATED COUNT: 18.7 % (ref 12.3–15.4)
FENTANYL UR-MCNC: NEGATIVE NG/ML
FLUAV RNA RESP QL NAA+PROBE: NOT DETECTED
FLUBV RNA RESP QL NAA+PROBE: NOT DETECTED
GLOBULIN UR ELPH-MCNC: 3.7 GM/DL
GLUCOSE SERPL-MCNC: 132 MG/DL (ref 65–99)
GLUCOSE UR STRIP-MCNC: NEGATIVE MG/DL
HCT VFR BLD AUTO: 29.3 % (ref 34–46.6)
HGB BLD-MCNC: 8.9 G/DL (ref 12–15.9)
HGB UR QL STRIP.AUTO: ABNORMAL
HOLD SPECIMEN: NORMAL
HYALINE CASTS UR QL AUTO: ABNORMAL /LPF
IMM GRANULOCYTES # BLD AUTO: 0.14 10*3/MM3 (ref 0–0.05)
IMM GRANULOCYTES NFR BLD AUTO: 1.6 % (ref 0–0.5)
KETONES UR QL STRIP: NEGATIVE
LEUKOCYTE ESTERASE UR QL STRIP.AUTO: ABNORMAL
LYMPHOCYTES # BLD AUTO: 0.73 10*3/MM3 (ref 0.7–3.1)
LYMPHOCYTES NFR BLD AUTO: 8.5 % (ref 19.6–45.3)
MCH RBC QN AUTO: 28.7 PG (ref 26.6–33)
MCHC RBC AUTO-ENTMCNC: 30.4 G/DL (ref 31.5–35.7)
MCV RBC AUTO: 94.5 FL (ref 79–97)
METHADONE UR QL SCN: NEGATIVE
MONOCYTES # BLD AUTO: 0.4 10*3/MM3 (ref 0.1–0.9)
MONOCYTES NFR BLD AUTO: 4.6 % (ref 5–12)
NEUTROPHILS NFR BLD AUTO: 7.32 10*3/MM3 (ref 1.7–7)
NEUTROPHILS NFR BLD AUTO: 85 % (ref 42.7–76)
NITRITE UR QL STRIP: POSITIVE
NRBC BLD AUTO-RTO: 0.7 /100 WBC (ref 0–0.2)
OPIATES UR QL: NEGATIVE
OXYCODONE UR QL SCN: NEGATIVE
PCP UR QL SCN: NEGATIVE
PH UR STRIP.AUTO: 6 [PH] (ref 5–8)
PLATELET # BLD AUTO: 164 10*3/MM3 (ref 140–450)
PMV BLD AUTO: 10.5 FL (ref 6–12)
POTASSIUM SERPL-SCNC: 3.7 MMOL/L (ref 3.5–5.2)
PROT SERPL-MCNC: 7 G/DL (ref 6–8.5)
PROT UR QL STRIP: ABNORMAL
RBC # BLD AUTO: 3.1 10*6/MM3 (ref 3.77–5.28)
RBC # UR STRIP: ABNORMAL /HPF
REF LAB TEST METHOD: ABNORMAL
SARS-COV-2 RNA RESP QL NAA+PROBE: NOT DETECTED
SODIUM SERPL-SCNC: 138 MMOL/L (ref 136–145)
SP GR UR STRIP: 1.01 (ref 1–1.03)
SQUAMOUS #/AREA URNS HPF: ABNORMAL /HPF
TRICYCLICS UR QL SCN: NEGATIVE
UROBILINOGEN UR QL STRIP: ABNORMAL
WBC # UR STRIP: ABNORMAL /HPF
WBC NRBC COR # BLD AUTO: 8.62 10*3/MM3 (ref 3.4–10.8)
WHOLE BLOOD HOLD COAG: NORMAL
WHOLE BLOOD HOLD SPECIMEN: NORMAL
YEAST URNS QL MICRO: ABNORMAL /HPF

## 2024-06-12 PROCEDURE — 93010 ELECTROCARDIOGRAM REPORT: CPT | Performed by: INTERNAL MEDICINE

## 2024-06-12 PROCEDURE — 93306 TTE W/DOPPLER COMPLETE: CPT

## 2024-06-12 PROCEDURE — 81001 URINALYSIS AUTO W/SCOPE: CPT | Performed by: EMERGENCY MEDICINE

## 2024-06-12 PROCEDURE — 87086 URINE CULTURE/COLONY COUNT: CPT | Performed by: EMERGENCY MEDICINE

## 2024-06-12 PROCEDURE — 87186 SC STD MICRODIL/AGAR DIL: CPT | Performed by: EMERGENCY MEDICINE

## 2024-06-12 PROCEDURE — 94640 AIRWAY INHALATION TREATMENT: CPT

## 2024-06-12 PROCEDURE — 87040 BLOOD CULTURE FOR BACTERIA: CPT

## 2024-06-12 PROCEDURE — 94799 UNLISTED PULMONARY SVC/PX: CPT

## 2024-06-12 PROCEDURE — G0378 HOSPITAL OBSERVATION PER HR: HCPCS

## 2024-06-12 PROCEDURE — G0152 HHCP-SERV OF OT,EA 15 MIN: HCPCS

## 2024-06-12 PROCEDURE — P9612 CATHETERIZE FOR URINE SPEC: HCPCS

## 2024-06-12 PROCEDURE — 71045 X-RAY EXAM CHEST 1 VIEW: CPT

## 2024-06-12 PROCEDURE — 93005 ELECTROCARDIOGRAM TRACING: CPT

## 2024-06-12 PROCEDURE — 80053 COMPREHEN METABOLIC PANEL: CPT | Performed by: EMERGENCY MEDICINE

## 2024-06-12 PROCEDURE — 25510000001 PERFLUTREN 6.52 MG/ML SUSPENSION: Performed by: NURSE PRACTITIONER

## 2024-06-12 PROCEDURE — 80307 DRUG TEST PRSMV CHEM ANLYZR: CPT

## 2024-06-12 PROCEDURE — 36415 COLL VENOUS BLD VENIPUNCTURE: CPT

## 2024-06-12 PROCEDURE — 70450 CT HEAD/BRAIN W/O DYE: CPT

## 2024-06-12 PROCEDURE — 83605 ASSAY OF LACTIC ACID: CPT

## 2024-06-12 PROCEDURE — 85025 COMPLETE CBC W/AUTO DIFF WBC: CPT

## 2024-06-12 PROCEDURE — 99285 EMERGENCY DEPT VISIT HI MDM: CPT

## 2024-06-12 PROCEDURE — 87636 SARSCOV2 & INF A&B AMP PRB: CPT | Performed by: EMERGENCY MEDICINE

## 2024-06-12 PROCEDURE — 94761 N-INVAS EAR/PLS OXIMETRY MLT: CPT

## 2024-06-12 PROCEDURE — 25010000002 CEFTRIAXONE PER 250 MG: Performed by: EMERGENCY MEDICINE

## 2024-06-12 PROCEDURE — 25010000002 METHYLPREDNISOLONE PER 125 MG: Performed by: EMERGENCY MEDICINE

## 2024-06-12 PROCEDURE — 25810000003 SODIUM CHLORIDE 0.9 % SOLUTION: Performed by: EMERGENCY MEDICINE

## 2024-06-12 PROCEDURE — 87077 CULTURE AEROBIC IDENTIFY: CPT | Performed by: EMERGENCY MEDICINE

## 2024-06-12 PROCEDURE — 93005 ELECTROCARDIOGRAM TRACING: CPT | Performed by: EMERGENCY MEDICINE

## 2024-06-12 RX ORDER — SODIUM CHLORIDE 0.9 % (FLUSH) 0.9 %
10 SYRINGE (ML) INJECTION AS NEEDED
Status: DISCONTINUED | OUTPATIENT
Start: 2024-06-12 | End: 2024-07-08 | Stop reason: HOSPADM

## 2024-06-12 RX ORDER — METHYLPREDNISOLONE SODIUM SUCCINATE 125 MG/2ML
125 INJECTION, POWDER, LYOPHILIZED, FOR SOLUTION INTRAMUSCULAR; INTRAVENOUS ONCE
Status: COMPLETED | OUTPATIENT
Start: 2024-06-12 | End: 2024-06-12

## 2024-06-12 RX ORDER — IPRATROPIUM BROMIDE AND ALBUTEROL SULFATE 2.5; .5 MG/3ML; MG/3ML
3 SOLUTION RESPIRATORY (INHALATION) ONCE
Status: COMPLETED | OUTPATIENT
Start: 2024-06-12 | End: 2024-06-12

## 2024-06-12 RX ADMIN — SODIUM CHLORIDE 1000 MG: 900 INJECTION INTRAVENOUS at 22:54

## 2024-06-12 RX ADMIN — PERFLUTREN 13.04 MG: 6.52 INJECTION, SUSPENSION INTRAVENOUS at 13:53

## 2024-06-12 RX ADMIN — IPRATROPIUM BROMIDE AND ALBUTEROL SULFATE 3 ML: .5; 3 SOLUTION RESPIRATORY (INHALATION) at 22:37

## 2024-06-12 RX ADMIN — SODIUM CHLORIDE 1000 ML: 900 INJECTION, SOLUTION INTRAVENOUS at 23:44

## 2024-06-12 RX ADMIN — METHYLPREDNISOLONE SODIUM SUCCINATE 125 MG: 125 INJECTION, POWDER, FOR SOLUTION INTRAMUSCULAR; INTRAVENOUS at 22:00

## 2024-06-12 RX ADMIN — Medication 10 ML: at 22:00

## 2024-06-12 RX ADMIN — SODIUM CHLORIDE 1000 ML: 900 INJECTION, SOLUTION INTRAVENOUS at 22:03

## 2024-06-12 NOTE — Clinical Note
Level of Care: Telemetry [5]   Diagnosis: Altered mental status [780.97.ICD-9-CM]   Admitting Physician: ANDI SALAZAR [[id number]]   Attending Physician: ANDI SALAZAR [[id number]]

## 2024-06-12 NOTE — TELEPHONE ENCOUNTER
Occupational therapist called today, patient and dry heaving 100.5 temp with cold chills on arrival. Caregiver- Cate, [phone] to reach patient today. Caregiver asked to monitor temp

## 2024-06-13 ENCOUNTER — HOME CARE VISIT (OUTPATIENT)
Dept: HOME HEALTH SERVICES | Facility: CLINIC | Age: 69
End: 2024-06-13
Payer: MEDICARE

## 2024-06-13 ENCOUNTER — TELEPHONE (OUTPATIENT)
Dept: CARDIOLOGY | Facility: CLINIC | Age: 69
End: 2024-06-13
Payer: MEDICARE

## 2024-06-13 LAB
ANION GAP SERPL CALCULATED.3IONS-SCNC: 11 MMOL/L (ref 5–15)
ASCENDING AORTA: 3.7 CM
BH CV ECHO MEAS - AO MAX PG: 7.1 MMHG
BH CV ECHO MEAS - AO MEAN PG: 4.1 MMHG
BH CV ECHO MEAS - AO ROOT DIAM: 3.4 CM
BH CV ECHO MEAS - AO V2 MAX: 133.3 CM/SEC
BH CV ECHO MEAS - AO V2 VTI: 28.5 CM
BH CV ECHO MEAS - AVA(I,D): 2.9 CM2
BH CV ECHO MEAS - EDV(CUBED): 125.2 ML
BH CV ECHO MEAS - EDV(MOD-SP2): 64.8 ML
BH CV ECHO MEAS - EDV(MOD-SP4): 91.5 ML
BH CV ECHO MEAS - EF(MOD-BP): 67 %
BH CV ECHO MEAS - EF(MOD-SP2): 66.7 %
BH CV ECHO MEAS - EF(MOD-SP4): 63.8 %
BH CV ECHO MEAS - ESV(CUBED): 27.2 ML
BH CV ECHO MEAS - ESV(MOD-SP2): 21.6 ML
BH CV ECHO MEAS - ESV(MOD-SP4): 33.1 ML
BH CV ECHO MEAS - FS: 39.9 %
BH CV ECHO MEAS - IVS/LVPW: 1 CM
BH CV ECHO MEAS - IVSD: 1.16 CM
BH CV ECHO MEAS - LA DIMENSION: 4.5 CM
BH CV ECHO MEAS - LAT PEAK E' VEL: 6 CM/SEC
BH CV ECHO MEAS - LV DIASTOLIC VOL/BSA (35-75): 44.8 CM2
BH CV ECHO MEAS - LV MASS(C)D: 222 GRAMS
BH CV ECHO MEAS - LV MAX PG: 4.1 MMHG
BH CV ECHO MEAS - LV MEAN PG: 2.45 MMHG
BH CV ECHO MEAS - LV SYSTOLIC VOL/BSA (12-30): 16.2 CM2
BH CV ECHO MEAS - LV V1 MAX: 101.7 CM/SEC
BH CV ECHO MEAS - LV V1 VTI: 21.5 CM
BH CV ECHO MEAS - LVIDD: 5 CM
BH CV ECHO MEAS - LVIDS: 3 CM
BH CV ECHO MEAS - LVOT AREA: 3.9 CM2
BH CV ECHO MEAS - LVOT DIAM: 2.22 CM
BH CV ECHO MEAS - LVPWD: 1.16 CM
BH CV ECHO MEAS - MED PEAK E' VEL: 8 CM/SEC
BH CV ECHO MEAS - MV A MAX VEL: 110.5 CM/SEC
BH CV ECHO MEAS - MV DEC SLOPE: 467.7 CM/SEC2
BH CV ECHO MEAS - MV DEC TIME: 0.2 SEC
BH CV ECHO MEAS - MV E MAX VEL: 95.8 CM/SEC
BH CV ECHO MEAS - MV E/A: 0.87
BH CV ECHO MEAS - MV MAX PG: 5 MMHG
BH CV ECHO MEAS - MV MEAN PG: 1.59 MMHG
BH CV ECHO MEAS - MV P1/2T: 59 MSEC
BH CV ECHO MEAS - MV V2 VTI: 24.2 CM
BH CV ECHO MEAS - MVA(P1/2T): 3.7 CM2
BH CV ECHO MEAS - MVA(VTI): 3.5 CM2
BH CV ECHO MEAS - PA V2 MAX: 102.6 CM/SEC
BH CV ECHO MEAS - RV MAX PG: 2.03 MMHG
BH CV ECHO MEAS - RV V1 MAX: 71.3 CM/SEC
BH CV ECHO MEAS - RV V1 VTI: 17.9 CM
BH CV ECHO MEAS - RVDD: 3.7 CM
BH CV ECHO MEAS - SV(LVOT): 83.6 ML
BH CV ECHO MEAS - SV(MOD-SP2): 43.2 ML
BH CV ECHO MEAS - SV(MOD-SP4): 58.4 ML
BH CV ECHO MEAS - SVI(LVOT): 40.9 ML/M2
BH CV ECHO MEAS - SVI(MOD-SP2): 21.2 ML/M2
BH CV ECHO MEAS - SVI(MOD-SP4): 28.6 ML/M2
BH CV ECHO MEAS - TAPSE (>1.6): 1.7 CM
BH CV ECHO MEASUREMENTS AVERAGE E/E' RATIO: 13.69
BH CV XLRA - TDI S': 10 CM/SEC
BUN SERPL-MCNC: 18 MG/DL (ref 8–23)
BUN/CREAT SERPL: 16.5 (ref 7–25)
CALCIUM SPEC-SCNC: 8.1 MG/DL (ref 8.6–10.5)
CHLORIDE SERPL-SCNC: 102 MMOL/L (ref 98–107)
CO2 SERPL-SCNC: 25 MMOL/L (ref 22–29)
CREAT SERPL-MCNC: 1.09 MG/DL (ref 0.57–1)
EGFRCR SERPLBLD CKD-EPI 2021: 54.8 ML/MIN/1.73
GLUCOSE SERPL-MCNC: 155 MG/DL (ref 65–99)
LEFT ATRIUM VOLUME INDEX: 32 ML/M2
LEFT ATRIUM VOLUME: 63 ML
MAGNESIUM SERPL-MCNC: 1.4 MG/DL (ref 1.6–2.4)
POTASSIUM SERPL-SCNC: 4 MMOL/L (ref 3.5–5.2)
SODIUM SERPL-SCNC: 138 MMOL/L (ref 136–145)

## 2024-06-13 PROCEDURE — 83735 ASSAY OF MAGNESIUM: CPT | Performed by: FAMILY MEDICINE

## 2024-06-13 PROCEDURE — 25010000002 CEFTRIAXONE PER 250 MG: Performed by: INTERNAL MEDICINE

## 2024-06-13 PROCEDURE — 83735 ASSAY OF MAGNESIUM: CPT | Performed by: INTERNAL MEDICINE

## 2024-06-13 PROCEDURE — 25010000002 MAGNESIUM SULFATE IN D5W 1G/100ML (PREMIX) 1-5 GM/100ML-% SOLUTION: Performed by: FAMILY MEDICINE

## 2024-06-13 PROCEDURE — 25010000002 METHYLPREDNISOLONE PER 40 MG: Performed by: FAMILY MEDICINE

## 2024-06-13 PROCEDURE — G0378 HOSPITAL OBSERVATION PER HR: HCPCS

## 2024-06-13 PROCEDURE — 36415 COLL VENOUS BLD VENIPUNCTURE: CPT | Performed by: INTERNAL MEDICINE

## 2024-06-13 PROCEDURE — 99213 OFFICE O/P EST LOW 20 MIN: CPT | Performed by: NURSE PRACTITIONER

## 2024-06-13 PROCEDURE — 80048 BASIC METABOLIC PNL TOTAL CA: CPT | Performed by: INTERNAL MEDICINE

## 2024-06-13 RX ORDER — FOLIC ACID 1 MG/1
1000 TABLET ORAL DAILY
Status: DISCONTINUED | OUTPATIENT
Start: 2024-06-13 | End: 2024-07-08 | Stop reason: HOSPADM

## 2024-06-13 RX ORDER — ACETAMINOPHEN 325 MG/1
650 TABLET ORAL EVERY 6 HOURS PRN
Status: DISCONTINUED | OUTPATIENT
Start: 2024-06-13 | End: 2024-07-08 | Stop reason: HOSPADM

## 2024-06-13 RX ORDER — POLYETHYLENE GLYCOL 3350 17 G/17G
17 POWDER, FOR SOLUTION ORAL DAILY PRN
Status: DISCONTINUED | OUTPATIENT
Start: 2024-06-13 | End: 2024-07-08 | Stop reason: HOSPADM

## 2024-06-13 RX ORDER — NITROGLYCERIN 0.4 MG/1
0.4 TABLET SUBLINGUAL
Status: DISCONTINUED | OUTPATIENT
Start: 2024-06-13 | End: 2024-07-08 | Stop reason: HOSPADM

## 2024-06-13 RX ORDER — AMOXICILLIN 250 MG
2 CAPSULE ORAL 2 TIMES DAILY PRN
Status: DISCONTINUED | OUTPATIENT
Start: 2024-06-13 | End: 2024-07-08 | Stop reason: HOSPADM

## 2024-06-13 RX ORDER — SPIRONOLACTONE 25 MG/1
25 TABLET ORAL DAILY
Status: DISCONTINUED | OUTPATIENT
Start: 2024-06-13 | End: 2024-07-08 | Stop reason: HOSPADM

## 2024-06-13 RX ORDER — FAMOTIDINE 20 MG/1
40 TABLET, FILM COATED ORAL DAILY
Status: DISCONTINUED | OUTPATIENT
Start: 2024-06-13 | End: 2024-06-13

## 2024-06-13 RX ORDER — SODIUM CHLORIDE 0.9 % (FLUSH) 0.9 %
10 SYRINGE (ML) INJECTION EVERY 12 HOURS SCHEDULED
Status: DISCONTINUED | OUTPATIENT
Start: 2024-06-13 | End: 2024-07-08 | Stop reason: HOSPADM

## 2024-06-13 RX ORDER — ESTRADIOL 0.1 MG/G
2 CREAM VAGINAL DAILY
Status: DISCONTINUED | OUTPATIENT
Start: 2024-06-13 | End: 2024-06-28

## 2024-06-13 RX ORDER — SODIUM CHLORIDE 9 MG/ML
40 INJECTION, SOLUTION INTRAVENOUS AS NEEDED
Status: DISCONTINUED | OUTPATIENT
Start: 2024-06-13 | End: 2024-07-08 | Stop reason: HOSPADM

## 2024-06-13 RX ORDER — MAGNESIUM SULFATE 1 G/100ML
1 INJECTION INTRAVENOUS
Status: COMPLETED | OUTPATIENT
Start: 2024-06-13 | End: 2024-06-13

## 2024-06-13 RX ORDER — FERROUS SULFATE 325(65) MG
325 TABLET ORAL
Status: DISCONTINUED | OUTPATIENT
Start: 2024-06-13 | End: 2024-07-08 | Stop reason: HOSPADM

## 2024-06-13 RX ORDER — LIDOCAINE 4 G/G
1 PATCH TOPICAL DAILY PRN
Status: DISCONTINUED | OUTPATIENT
Start: 2024-06-13 | End: 2024-07-08 | Stop reason: HOSPADM

## 2024-06-13 RX ORDER — CARVEDILOL 25 MG/1
25 TABLET ORAL 2 TIMES DAILY WITH MEALS
Status: DISCONTINUED | OUTPATIENT
Start: 2024-06-13 | End: 2024-07-08 | Stop reason: HOSPADM

## 2024-06-13 RX ORDER — BISACODYL 5 MG/1
5 TABLET, DELAYED RELEASE ORAL DAILY PRN
Status: DISCONTINUED | OUTPATIENT
Start: 2024-06-13 | End: 2024-07-08 | Stop reason: HOSPADM

## 2024-06-13 RX ORDER — ASCORBIC ACID 500 MG
500 TABLET ORAL DAILY
Status: DISCONTINUED | OUTPATIENT
Start: 2024-06-13 | End: 2024-07-08 | Stop reason: HOSPADM

## 2024-06-13 RX ORDER — METHYLPREDNISOLONE SODIUM SUCCINATE 40 MG/ML
40 INJECTION, POWDER, LYOPHILIZED, FOR SOLUTION INTRAMUSCULAR; INTRAVENOUS EVERY 24 HOURS
Status: DISCONTINUED | OUTPATIENT
Start: 2024-06-13 | End: 2024-06-13

## 2024-06-13 RX ORDER — LOSARTAN POTASSIUM 50 MG/1
50 TABLET ORAL DAILY
Status: DISCONTINUED | OUTPATIENT
Start: 2024-06-13 | End: 2024-07-08 | Stop reason: HOSPADM

## 2024-06-13 RX ORDER — PANTOPRAZOLE SODIUM 40 MG/1
40 TABLET, DELAYED RELEASE ORAL DAILY
Status: DISCONTINUED | OUTPATIENT
Start: 2024-06-13 | End: 2024-07-08 | Stop reason: HOSPADM

## 2024-06-13 RX ORDER — MULTIPLE VITAMINS W/ MINERALS TAB 9MG-400MCG
1 TAB ORAL DAILY
Status: DISCONTINUED | OUTPATIENT
Start: 2024-06-13 | End: 2024-07-08 | Stop reason: HOSPADM

## 2024-06-13 RX ORDER — NALOXONE HCL 0.4 MG/ML
0.4 VIAL (ML) INJECTION
Status: DISCONTINUED | OUTPATIENT
Start: 2024-06-13 | End: 2024-07-08 | Stop reason: HOSPADM

## 2024-06-13 RX ORDER — METHYLPREDNISOLONE SODIUM SUCCINATE 40 MG/ML
20 INJECTION, POWDER, LYOPHILIZED, FOR SOLUTION INTRAMUSCULAR; INTRAVENOUS EVERY 24 HOURS
Status: DISCONTINUED | OUTPATIENT
Start: 2024-06-13 | End: 2024-06-14

## 2024-06-13 RX ORDER — FUROSEMIDE 40 MG/1
40 TABLET ORAL DAILY
Status: DISCONTINUED | OUTPATIENT
Start: 2024-06-13 | End: 2024-07-08 | Stop reason: HOSPADM

## 2024-06-13 RX ORDER — ASPIRIN 81 MG/1
81 TABLET ORAL DAILY
Status: DISCONTINUED | OUTPATIENT
Start: 2024-06-13 | End: 2024-07-08 | Stop reason: HOSPADM

## 2024-06-13 RX ORDER — LEFLUNOMIDE 20 MG/1
20 TABLET ORAL DAILY
Status: DISCONTINUED | OUTPATIENT
Start: 2024-06-13 | End: 2024-07-08 | Stop reason: HOSPADM

## 2024-06-13 RX ORDER — TRAMADOL HYDROCHLORIDE 50 MG/1
50 TABLET ORAL EVERY 12 HOURS PRN
Status: DISCONTINUED | OUTPATIENT
Start: 2024-06-13 | End: 2024-06-28

## 2024-06-13 RX ORDER — ISOSORBIDE MONONITRATE 60 MG/1
60 TABLET, EXTENDED RELEASE ORAL DAILY
Status: DISCONTINUED | OUTPATIENT
Start: 2024-06-13 | End: 2024-07-08 | Stop reason: HOSPADM

## 2024-06-13 RX ORDER — SODIUM CHLORIDE 0.9 % (FLUSH) 0.9 %
10 SYRINGE (ML) INJECTION AS NEEDED
Status: DISCONTINUED | OUTPATIENT
Start: 2024-06-13 | End: 2024-07-08 | Stop reason: HOSPADM

## 2024-06-13 RX ORDER — ONDANSETRON 2 MG/ML
4 INJECTION INTRAMUSCULAR; INTRAVENOUS EVERY 6 HOURS PRN
Status: DISCONTINUED | OUTPATIENT
Start: 2024-06-13 | End: 2024-07-08 | Stop reason: HOSPADM

## 2024-06-13 RX ORDER — METHENAMINE HIPPURATE 1000 MG/1
1 TABLET ORAL 2 TIMES DAILY WITH MEALS
COMMUNITY

## 2024-06-13 RX ORDER — DULOXETIN HYDROCHLORIDE 30 MG/1
60 CAPSULE, DELAYED RELEASE ORAL DAILY
Status: DISCONTINUED | OUTPATIENT
Start: 2024-06-13 | End: 2024-07-08 | Stop reason: HOSPADM

## 2024-06-13 RX ORDER — BISACODYL 10 MG
10 SUPPOSITORY, RECTAL RECTAL DAILY PRN
Status: DISCONTINUED | OUTPATIENT
Start: 2024-06-13 | End: 2024-07-08 | Stop reason: HOSPADM

## 2024-06-13 RX ORDER — PREDNISONE 5 MG/1
5 TABLET ORAL DAILY
Status: DISCONTINUED | OUTPATIENT
Start: 2024-06-13 | End: 2024-06-13

## 2024-06-13 RX ORDER — METHENAMINE HIPPURATE 1000 MG/1
1 TABLET ORAL 2 TIMES DAILY
Status: DISCONTINUED | OUTPATIENT
Start: 2024-06-13 | End: 2024-07-08 | Stop reason: HOSPADM

## 2024-06-13 RX ORDER — MORPHINE SULFATE 2 MG/ML
1 INJECTION, SOLUTION INTRAMUSCULAR; INTRAVENOUS EVERY 4 HOURS PRN
Status: DISCONTINUED | OUTPATIENT
Start: 2024-06-13 | End: 2024-06-14

## 2024-06-13 RX ORDER — LEVOTHYROXINE SODIUM 0.07 MG/1
75 TABLET ORAL
Status: DISCONTINUED | OUTPATIENT
Start: 2024-06-13 | End: 2024-07-08 | Stop reason: HOSPADM

## 2024-06-13 RX ADMIN — OXYCODONE HYDROCHLORIDE AND ACETAMINOPHEN 500 MG: 500 TABLET ORAL at 08:13

## 2024-06-13 RX ADMIN — ISOSORBIDE MONONITRATE 60 MG: 60 TABLET, EXTENDED RELEASE ORAL at 08:14

## 2024-06-13 RX ADMIN — DULOXETINE HYDROCHLORIDE 60 MG: 30 CAPSULE, DELAYED RELEASE ORAL at 08:13

## 2024-06-13 RX ADMIN — FUROSEMIDE 40 MG: 40 TABLET ORAL at 08:13

## 2024-06-13 RX ADMIN — LEFLUNOMIDE 20 MG: 20 TABLET ORAL at 08:14

## 2024-06-13 RX ADMIN — FOLIC ACID 1000 MCG: 1 TABLET ORAL at 08:13

## 2024-06-13 RX ADMIN — Medication 1 TABLET: at 08:14

## 2024-06-13 RX ADMIN — Medication 10 ML: at 21:01

## 2024-06-13 RX ADMIN — SPIRONOLACTONE 25 MG: 25 TABLET ORAL at 08:16

## 2024-06-13 RX ADMIN — MAGNESIUM SULFATE IN DEXTROSE 1 G: 10 INJECTION, SOLUTION INTRAVENOUS at 12:08

## 2024-06-13 RX ADMIN — LOSARTAN POTASSIUM 50 MG: 50 TABLET, FILM COATED ORAL at 08:15

## 2024-06-13 RX ADMIN — APIXABAN 5 MG: 5 TABLET, FILM COATED ORAL at 08:13

## 2024-06-13 RX ADMIN — LEVOTHYROXINE SODIUM 75 MCG: 0.07 TABLET ORAL at 05:18

## 2024-06-13 RX ADMIN — CEFTRIAXONE SODIUM 2000 MG: 2 INJECTION, POWDER, FOR SOLUTION INTRAMUSCULAR; INTRAVENOUS at 11:19

## 2024-06-13 RX ADMIN — APIXABAN 5 MG: 5 TABLET, FILM COATED ORAL at 21:01

## 2024-06-13 RX ADMIN — TRAMADOL HYDROCHLORIDE 50 MG: 50 TABLET ORAL at 21:10

## 2024-06-13 RX ADMIN — ESTRADIOL 2 APPLICATOR: 0.1 CREAM VAGINAL at 08:14

## 2024-06-13 RX ADMIN — APIXABAN 5 MG: 5 TABLET, FILM COATED ORAL at 01:52

## 2024-06-13 RX ADMIN — MAGNESIUM SULFATE IN DEXTROSE 1 G: 10 INJECTION, SOLUTION INTRAVENOUS at 14:17

## 2024-06-13 RX ADMIN — METHYLPREDNISOLONE SODIUM SUCCINATE 20 MG: 40 INJECTION, POWDER, FOR SOLUTION INTRAMUSCULAR; INTRAVENOUS at 21:01

## 2024-06-13 RX ADMIN — ASPIRIN 81 MG: 81 TABLET, COATED ORAL at 08:12

## 2024-06-13 RX ADMIN — PANTOPRAZOLE SODIUM 40 MG: 40 TABLET, DELAYED RELEASE ORAL at 08:15

## 2024-06-13 RX ADMIN — MAGNESIUM SULFATE IN DEXTROSE 1 G: 10 INJECTION, SOLUTION INTRAVENOUS at 13:03

## 2024-06-13 RX ADMIN — METHENAMINE HIPPURATE 1 G: 1000 TABLET ORAL at 21:01

## 2024-06-13 RX ADMIN — Medication 10 ML: at 08:15

## 2024-06-13 RX ADMIN — FERROUS SULFATE TAB 325 MG (65 MG ELEMENTAL FE) 325 MG: 325 (65 FE) TAB at 08:14

## 2024-06-13 RX ADMIN — CARVEDILOL 25 MG: 25 TABLET, FILM COATED ORAL at 08:13

## 2024-06-13 NOTE — PLAN OF CARE
Goal Outcome Evaluation:  Plan of Care Reviewed With: patient, caregiver        Progress: no change  Outcome Evaluation: Patient alert to self only. VSS. On 2 L O2 NC. NSR on telemetry. Right gluteal wound, abscess, wound care consult in place. Caregiver at bedside. Safety maintained.

## 2024-06-13 NOTE — CONSULTS
[facility]  INPATIENT WOUND & OSTOMY CONSULTATION    Today's Date: 06/13/24    Patient Name: Tawny Shin  MRN: [id number]  CSN: [id number]  PCP: Moises Oliveira MD  Referring Provider:   Consulting Provider (From admission, onward)      Start Ordered     Status Ordering Provider    06/13/24 0047 06/13/24 0047  Inpatient Wound Care MD Consult  Once        Specialty:  Wound Care  Provider:  Polly Senior APRN Acknowledged OGBODO, EMMANUEL O           Attending Provider: Kris Cade,*  Length of Stay: 0    SUBJECTIVE   Chief Complaint: right buttock wound    HPI: Tawny Shin, a 68 y.o.female, presents with a past medical history of arthritis, heart disease, Sjogren's disease, hypertension, MI, hyperlipidemia, coronary artery disease, osteomyelitis of right femur, COPD.  A full past medical history as listed below.  Inpatient wound care consulted due to wound of right buttock.  Patient had cyst removal on 4/23/2024 by Dr. Keyes closed with 3 layer suture closure.  Tissue was sent for pathology which showed skin and underlying soft tissue with extensive fat necrosis, inflammation, and granulation tissue.  There was no evidence of malignancy.  Wound has opened up and has been dressed with wet-to-dry dressings by home health.  Upon assessment saline moistened gauze is removed with another substance in wound which is difficult to identify.  It appears to be a Hydrofiber type dressing but there is no documentation of this being used.      Visit Dx:    ICD-10-CM ICD-9-CM   1. Acute UTI (urinary tract infection)  N39.0 599.0   2. Altered mental status, unspecified altered mental status type  R41.82 780.97       Hospital Problem List:     Altered mental status    UTI (urinary tract infection)      History:   Past Medical History:   Diagnosis Date    Age-related osteoporosis with current pathological fracture 05/27/2020    Arthritis     Asthma     Bilateral bunions 12/23/2020     "Cancer     Cardiac pacemaker syndrome 12/23/2020    Overview:  - heart block - implanted 11/16    Charcot's joint of foot, left 12/23/2020    Chronic deep vein thrombosis (DVT) of right lower extremity 06/23/2021    Chronic pain syndrome 06/22/2021    Chronic sinusitis     COPD (chronic obstructive pulmonary disease)     Coronary artery disease     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    Elevated cholesterol     Eustachian tube dysfunction     Heart disease     Herpes simplex     History of transfusion     Hyperlipidemia     Hypertension     Hypothyroidism 12/23/2020    Intrinsic asthma 12/23/2020    Knee dislocation     Labral tear of right hip joint     Laryngitis sicca     Laryngitis, chronic     Left carotid bruit 03/09/2016    MI (myocardial infarction)     Myalgia due to statin 06/25/2019    Open wound of right hip 09/14/2021    Osteomyelitis of right femur 07/06/2021    Otorrhea     Pacemaker 11/17/2016    Primary osteoarthritis of left knee 12/23/2020    Psoriasis vulgaris 12/23/2020    S/P coronary artery stent placement 03/09/2016    Sensorineural hearing loss     Seropositive rheumatoid arthritis of multiple sites 12/27/2019    Overview:  -myochrysine '93-'96 -methotrexate '96--->11/98;r/s  restarted 2/99--> 8/14 (anemia) -sulfasalazine- not effective -penicillamine 6/98-->10/98; no effect -leflunomide 11/98--> - Humira '13-->didn't take - Enbrel 12/14-->3/15- no effect!   Last Assessment & Plan:  - \"aching all over\" because she had to be off her anti-rheumatic drugs for 2 weeks in preparation for her R knee surgery - he    Sick sinus syndrome 12/27/2019    Sjogren's disease     Spondylolisthesis of lumbar region 01/17/2018    Syncope, recurrent 02/08/2021    Urinary tract infection     UTI (urinary tract infection) 04/19/2024     Past Surgical History:   Procedure Laterality Date    A-V CARDIAC PACEMAKER INSERTION  2016    ATRIAL CARDIAC PACEMAKER INSERTION      CARDIAC CATHETERIZATION      " CATARACT EXTRACTION      CERVICAL CORPECTOMY N/A 3/3/2021    Procedure: CERVICAL 6 CORPECTOMY WITH TITANIUM CAGE WITH NEURO MONITORING;  Surgeon: Bandar Shea MD;  Location: [facility] OR;  Service: Neurosurgery;  Laterality: N/A;    COLONOSCOPY  11/08/2011    One fold in the ascending colon which showed ulcer otherwise normal exam    COLONOSCOPY  11/12/2004    Normal exam repeat in five years    CORONARY ANGIOPLASTY WITH STENT PLACEMENT      X 2; 2013 & 2014    ENDOSCOPY  07/10/2014    Normal exam    ENDOSCOPY N/A 5/30/2024    Procedure: ESOPHAGOGASTRODUODENOSCOPY WITH ANESTHESIA;  Surgeon: Taiwo Sanchez MD;  Location: [facility] ENDOSCOPY;  Service: Gastroenterology;  Laterality: N/A;  preop; dysphagia  postop: balloon dilation  PCP Jesus Manuel jeff    FLAP LEG Right 9/14/2021    Procedure: RIGHT GLUTEAL FASCIOCUTANEOUS ADVANCEMENT FLAP AND RIGHT TENSOR FASCIAL JESSICA FLAP;  Surgeon: Amadeo Turner MD;  Location: [facility] OR;  Service: Plastics;  Laterality: Right;    HIP ABDUCTION TENOTOMY BILATERAL Right 1/14/2021    Procedure: RIGHT HIP GLUTEUS MEDLUS / MINIMUS REPAIR, POSSIBLE ACHILLES ALLOGRAFT;  Surgeon: Nino Carlson MD;  Location: [facility] OR;  Service: Orthopedics;  Laterality: Right;    INCISION AND DRAINAGE ABSCESS Right 6/4/2022    Procedure: INCISION AND DRAINAGE ABSCESS right hip;  Surgeon: Magda Salcido MD;  Location: [facility] OR;  Service: General;  Laterality: Right;    INCISION AND DRAINAGE ABSCESS Right 6/10/2022    Procedure: RIGHT HIP INCISION AND DRAINAGE. MD NEEDS 3L VANC IRRIGATION, CURRETTES, DAICANS, KERLEX ROLLS;  Surgeon: Amadeo Turner MD;  Location:  [facility] OR;  Service: Plastics;  Laterality: Right;    INCISION AND DRAINAGE HIP Right 2/9/2021    Procedure: HIP INCISION AND DRAINAGE;  Surgeon: Nino Carlson MD;  Location:  [facility] OR;  Service: Orthopedics;  Laterality: Right;    INCISION AND DRAINAGE LEG Right 10/24/2021    Procedure: INCISION AND DRAINAGE LOWER EXTREMITY;  Surgeon:  Amadeo Turner MD;  Location:  [facility] OR;  Service: Plastics;  Laterality: Right;    INCISION AND DRAINAGE OF WOUND Right 7/8/2021    Procedure: INCISION AND DRAINAGE WOUND RIGHT HIP;  Surgeon: James Huntley MD;  Location:  [facility] OR;  Service: Orthopedics;  Laterality: Right;    JOINT REPLACEMENT      KYPHOPLASTY WITH BIOPSY Bilateral 10/26/2021    Procedure: THOARCIC 12 KYPHOPLASTY WITH BIOPSY;  Surgeon: Bandar Shea MD;  Location:  [facility] OR;  Service: Neurosurgery;  Laterality: Bilateral;    LEG DEBRIDEMENT Right 9/14/2021    Procedure: DEBRIDEMENT OF RIGHT HIP WOUND, RIGHT GLUTEAL FASCIOCUTANEOUS ADVANCEMENT FLAP AND RIGHT TENSOR FASCIAL JESSICA FLAP;  Surgeon: Amadeo Turner MD;  Location:  [facility] OR;  Service: Plastics;  Laterality: Right;    LUMBAR DISCECTOMY Right 3/23/2021    Procedure: LUMBAR DISCECTOMY MICRO, Lumbar 1/2 right;  Surgeon: Bandar Shea MD;  Location:  [facility] OR;  Service: Neurosurgery;  Laterality: Right;    LUMBAR FUSION N/A 1/19/2018    Procedure: L3-4,L4-5 DECOMPRESSION, POSTERIOR SPINAL FUSION WITH INSTRUMENTATION;  Surgeon: Fortino Oropeza MD;  Location:  [facility] OR;  Service:     LUMBAR LAMINECTOMY WITH FUSION Left 1/17/2018    Procedure: LEFT L3-4 L4-5 LATERAL LUMBAR INTERBODY FUSION;  Surgeon: Fortino Oropeza MD;  Location:  [facility] OR;  Service:     MASS EXCISION Right 4/23/2024    Procedure: RIGHT BUTTOCK MASS EXCISION;  Surgeon: Moises Keyes MD;  Location:  [facility] OR;  Service: General;  Laterality: Right;    MYRINGOTOMY W/ TUBES  09/04/2014    TUBES NO LONGER IN PLACE    OTHER SURGICAL HISTORY      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 so hardware was removed and spacers were placed    REPLACEMENT TOTAL KNEE Right      Social History     Socioeconomic History    Marital status:    Tobacco Use    Smoking status: Never     Passive exposure: Never    Smokeless tobacco: Never   Vaping Use    Vaping status: Never Used   Substance and Sexual Activity     Alcohol use: No    Drug use: Never    Sexual activity: Defer     Birth control/protection: Abstinence     Family History   Problem Relation Age of Onset    Cancer Mother         Lung cancer    Heart disease Father         Doied of heart. Attack       Allergies:  Allergies   Allergen Reactions    Atorvastatin Other (See Comments)     LEG CRAMPS      Amoxicillin Rash    Escitalopram Rash    Nabumetone Rash    Niacin Er Rash    Penicillin G Rash    Penicillins Rash    Simvastatin Rash       Medications:    Current Facility-Administered Medications:     acetaminophen (TYLENOL) tablet 650 mg, 650 mg, Oral, Q6H PRN, Wes Andrews MD    apixaban (ELIQUIS) tablet 5 mg, 5 mg, Oral, BID, Wes Andrews MD, 5 mg at 06/13/24 0813    ascorbic acid (VITAMIN C) tablet 500 mg, 500 mg, Oral, Daily, Wes Andrews MD, 500 mg at 06/13/24 0813    aspirin EC tablet 81 mg, 81 mg, Oral, Daily, Wes Andrews MD, 81 mg at 06/13/24 0812    sennosides-docusate (PERICOLACE) 8.6-50 MG per tablet 2 tablet, 2 tablet, Oral, BID PRN **AND** polyethylene glycol (MIRALAX) packet 17 g, 17 g, Oral, Daily PRN **AND** bisacodyl (DULCOLAX) EC tablet 5 mg, 5 mg, Oral, Daily PRN **AND** bisacodyl (DULCOLAX) suppository 10 mg, 10 mg, Rectal, Daily PRN, Wes Andrews MD    Calcium Replacement - Follow Nurse / BPA Driven Protocol, , Does not apply, PRN, Wes Andrews MD    carvedilol (COREG) tablet 25 mg, 25 mg, Oral, BID With Meals, Wes Andrews MD, 25 mg at 06/13/24 0813    cefTRIAXone (ROCEPHIN) 2,000 mg in sodium chloride 0.9 % 100 mL MBP, 2,000 mg, Intravenous, Q24H, Wes Andrews MD, Last Rate: 200 mL/hr at 06/13/24 1119, 2,000 mg at 06/13/24 1119    Diclofenac Sodium (VOLTAREN) 1 % gel 4 g, 4 g, Topical, 4x Daily PRN, Wes Andrews MD    DULoxetine (CYMBALTA) DR capsule 60 mg, 60 mg, Oral, Daily, Wes Andrews MD, 60 mg at 06/13/24 0813    estradiol (ESTRACE) vaginal cream 2 applicator, 2 g,  Vaginal, Daily, Wes Andrews MD, 2 applicator at 06/13/24 0814    ferrous sulfate tablet 325 mg, 325 mg, Oral, Daily With Breakfast, Wes Andrews MD, 325 mg at 06/13/24 0814    folic acid (FOLVITE) tablet 1,000 mcg, 1,000 mcg, Oral, Daily, Wes Andrews MD, 1,000 mcg at 06/13/24 0813    furosemide (LASIX) tablet 40 mg, 40 mg, Oral, Daily, Wes Andrews MD, 40 mg at 06/13/24 0813    isosorbide mononitrate (IMDUR) 24 hr tablet 60 mg, 60 mg, Oral, Daily, Wes Andrews MD, 60 mg at 06/13/24 0814    leflunomide (ARAVA) tablet 20 mg, 20 mg, Oral, Daily, Wes Andrews MD, 20 mg at 06/13/24 0814    levothyroxine (SYNTHROID, LEVOTHROID) tablet 75 mcg, 75 mcg, Oral, Q AM, Wes Andrews MD, 75 mcg at 06/13/24 0518    Lidocaine 4 % 1 patch, 1 patch, Transdermal, Daily PRN, Wes Andrews MD    losartan (COZAAR) tablet 50 mg, 50 mg, Oral, Daily, Wes Andrews MD, 50 mg at 06/13/24 0815    Magnesium Low Dose Replacement - Follow Nurse / BPA Driven Protocol, , Does not apply, PRN, Wes Andrews MD    magnesium sulfate in D5W 1g/100mL (PREMIX), 1 g, Intravenous, Q1H, Kris Cade MD    methylPREDNISolone sodium succinate (SOLU-Medrol) injection 40 mg, 40 mg, Intravenous, Q24H, Wes Andrews MD    Morphine sulfate (PF) injection 1 mg, 1 mg, Intravenous, Q4H PRN **AND** naloxone (NARCAN) injection 0.4 mg, 0.4 mg, Intravenous, Q5 Min PRN, Wes Andrews MD    multivitamin with minerals 1 tablet, 1 tablet, Oral, Daily, Wes Andrews MD, 1 tablet at 06/13/24 0814    nitroglycerin (NITROSTAT) SL tablet 0.4 mg, 0.4 mg, Sublingual, Q5 Min PRN, Wes Andrews MD    ondansetron (ZOFRAN) injection 4 mg, 4 mg, Intravenous, Q6H PRN, Wes Andrews MD    pantoprazole (PROTONIX) EC tablet 40 mg, 40 mg, Oral, Daily, Wes Andrews MD, 40 mg at 06/13/24 0815    PATIENT SUPPLIED MEDICATION, 1 g, Oral, BID, Wes Andrews MD    Phosphorus  Replacement - Follow Nurse / BPA Driven Protocol, , Does not apply, Juliana PALACIO Emmanuel O, MD    Potassium Replacement - Follow Nurse / BPA Driven Protocol, , Does not apply, Juliana PALACIO Emmanuel O, MD    sodium chloride 0.9 % flush 10 mL, 10 mL, Intravenous, PRN, Calvin Booth DO, 10 mL at 06/12/24 2200    sodium chloride 0.9 % flush 10 mL, 10 mL, Intravenous, Q12H, Wes Andrews MD, 10 mL at 06/13/24 0815    sodium chloride 0.9 % flush 10 mL, 10 mL, Intravenous, PRN, Wes Andrews MD    sodium chloride 0.9 % infusion 40 mL, 40 mL, Intravenous, PRN, Wes Andrews MD    spironolactone (ALDACTONE) tablet 25 mg, 25 mg, Oral, Daily, Wes Andrews MD, 25 mg at 06/13/24 0816    traMADol (ULTRAM) tablet 50 mg, 50 mg, Oral, Q12H PRN, Wes Andrews MD    OBJECTIVE     Vitals:    06/13/24 1100   BP: 122/56   Pulse: 70   Resp: 18   Temp: 98.2 °F (36.8 °C)   SpO2: 95%       PHYSICAL EXAM:   Physical Exam  Vitals and nursing note reviewed.   Constitutional:       General: She is awake.      Appearance: She is obese.      Comments: Body mass index is 33.73 kg/m².    HENT:      Head: Normocephalic and atraumatic.   Eyes:      General: Lids are normal. Gaze aligned appropriately.   Cardiovascular:      Rate and Rhythm: Normal rate and regular rhythm.   Pulmonary:      Effort: Pulmonary effort is normal. No respiratory distress.   Musculoskeletal:      Cervical back: Normal range of motion and neck supple.   Skin:     General: Skin is warm and dry.      Findings: Wound present.      Comments: Right buttock wound measures 1.2 cm x 2 cm x 2.5 cm.  Undermining circumferentially reaching 5 cm at 4 o'clock.  Subcutaneous tissue, wound bed.  Small amount of serosanguineous drainage noted.  Periwound area is slightly erythemic but blanchable.   Neurological:      Mental Status: She is alert and oriented to person, place, and time.   Psychiatric:         Attention and Perception: Attention normal.          Mood and Affect: Mood normal.         Speech: Speech normal.         Behavior: Behavior is cooperative.            Results Review:  Lab Results (last 48 hours)       Procedure Component Value Units Date/Time    Basic Metabolic Panel [[id number]]  (Abnormal) Collected: 06/13/24 0441    Specimen: Blood Updated: 06/13/24 0522     Glucose 155 mg/dL      BUN 18 mg/dL      Creatinine 1.09 mg/dL      Sodium 138 mmol/L      Potassium 4.0 mmol/L      Chloride 102 mmol/L      CO2 25.0 mmol/L      Calcium 8.1 mg/dL      BUN/Creatinine Ratio 16.5     Anion Gap 11.0 mmol/L      eGFR 54.8 mL/min/1.73     Narrative:      GFR Normal >60  Chronic Kidney Disease <60  Kidney Failure <15      Magnesium [[id number]]  (Abnormal) Collected: 06/13/24 0441    Specimen: Blood Updated: 06/13/24 0522     Magnesium 1.4 mg/dL     Urine Culture - Urine, Straight Cath [[id number]] Collected: 06/12/24 2117    Specimen: Urine from Straight Cath Updated: 06/12/24 2247    Urinalysis With Microscopic If Indicated (No Culture) - Straight Cath [[id number]]  (Abnormal) Collected: 06/12/24 2117    Specimen: Urine from Straight Cath Updated: 06/12/24 2209     Color, UA Yellow     Appearance, UA Turbid     pH, UA 6.0     Specific Gravity, UA 1.015     Glucose, UA Negative     Ketones, UA Negative     Bilirubin, UA Negative     Blood, UA Moderate (2+)     Protein, UA 30 mg/dL (1+)     Leuk Esterase, UA Large (3+)     Nitrite, UA Positive     Urobilinogen, UA 0.2 E.U./dL    Urinalysis, Microscopic Only - Straight Cath [[id number]]  (Abnormal) Collected: 06/12/24 2117    Specimen: Urine from Straight Cath Updated: 06/12/24 2209     RBC, UA 3-5 /HPF      WBC, UA Too Numerous to Count /HPF      Bacteria, UA 3+ /HPF      Squamous Epithelial Cells, UA 3-6 /HPF      Yeast, UA Small/1+ Budding Yeast /HPF      Hyaline Casts, UA 0-2 /LPF      Methodology Manual Light Microscopy    COVID PRE-OP / PRE-PROCEDURE SCREENING ORDER (NO ISOLATION) - Swab, Nasopharynx  [[id number]]  (Normal) Collected: 06/12/24 2125    Specimen: Swab from Nasopharynx Updated: 06/12/24 2156    Narrative:      The following orders were created for panel order COVID PRE-OP / PRE-PROCEDURE SCREENING ORDER (NO ISOLATION) - Swab, Nasopharynx.  Procedure                               Abnormality         Status                     ---------                               -----------         ------                     COVID-19 and FLU A/B PCR...[[id number]]  Normal              Final result                 Please view results for these tests on the individual orders.    COVID-19 and FLU A/B PCR, 1 HR TAT - Swab, Nasopharynx [[id number]]  (Normal) Collected: 06/12/24 2125    Specimen: Swab from Nasopharynx Updated: 06/12/24 2156     COVID19 Not Detected     Influenza A PCR Not Detected     Influenza B PCR Not Detected    Narrative:      Fact sheet for providers: https://www.fda.gov/media/889063/download    Fact sheet for patients: https://www.fda.gov/media/218105/download    Test performed by PCR.    Comprehensive Metabolic Panel [[id number]]  (Abnormal) Collected: 06/12/24 2112    Specimen: Blood Updated: 06/12/24 2142     Glucose 132 mg/dL      BUN 19 mg/dL      Creatinine 1.13 mg/dL      Sodium 138 mmol/L      Potassium 3.7 mmol/L      Chloride 100 mmol/L      CO2 26.0 mmol/L      Calcium 8.5 mg/dL      Total Protein 7.0 g/dL      Albumin 3.3 g/dL      ALT (SGPT) 23 U/L      AST (SGOT) 27 U/L      Alkaline Phosphatase 77 U/L      Total Bilirubin 0.5 mg/dL      Globulin 3.7 gm/dL      A/G Ratio 0.9 g/dL      BUN/Creatinine Ratio 16.8     Anion Gap 12.0 mmol/L      eGFR 52.4 mL/min/1.73     Narrative:      GFR Normal >60  Chronic Kidney Disease <60  Kidney Failure <15      Urine Drug Screen - Straight Cath [[id number]]  (Normal) Collected: 06/12/24 2117    Specimen: Urine from Straight Cath Updated: 06/12/24 2140     THC, Screen, Urine Negative     Phencyclidine (PCP), Urine Negative     Cocaine Screen, Urine  Negative     Methamphetamine, Ur Negative     Opiate Screen Negative     Amphetamine Screen, Urine Negative     Benzodiazepine Screen, Urine Negative     Tricyclic Antidepressants Screen Negative     Methadone Screen, Urine Negative     Barbiturates Screen, Urine Negative     Oxycodone Screen, Urine Negative     Buprenorphine, Screen, Urine Negative    Narrative:      Cutoff For Drugs Screened:    Amphetamines               500 ng/ml  Barbiturates               200 ng/ml  Benzodiazepines            150 ng/ml  Cocaine                    150 ng/ml  Methadone                  200 ng/ml  Opiates                    100 ng/ml  Phencyclidine               25 ng/ml  THC                         50 ng/ml  Methamphetamine            500 ng/ml  Tricyclic Antidepressants  300 ng/ml  Oxycodone                  100 ng/ml  Buprenorphine               10 ng/ml    The normal value for all drugs tested is negative. This report includes unconfirmed screening results, with the cutoff values listed, to be used for medical treatment purposes only.  Unconfirmed results must not be used for non-medical purposes such as employment or legal testing.  Clinical consideration should be applied to any drug of abuse test, particularly when unconfirmed results are used.      Lactic Acid, Plasma [[id number]]  (Normal) Collected: 06/12/24 2112    Specimen: Blood Updated: 06/12/24 2139     Lactate 1.0 mmol/L     Fentanyl, Urine - Straight Cath [[id number]]  (Normal) Collected: 06/12/24 2117    Specimen: Urine from Straight Cath Updated: 06/12/24 2139     Fentanyl, Urine Negative    Narrative:      Negative Threshold:      Fentanyl 5 ng/mL     The normal value for the drug tested is negative. This report includes final unconfirmed screening results to be used for medical treatment purposes only. Unconfirmed results must not be used for non-medical purposes such as employment or legal testing. Clinical consideration should be applied to any drug of abuse  test, particularly when unconfirmed results are used.           Blood Culture - Blood, Arm, Left [[id number]] Collected: 06/12/24 2120    Specimen: Blood from Arm, Left Updated: 06/12/24 2134    Blood Culture - Blood, Arm, Right [[id number]] Collected: 06/12/24 2112    Specimen: Blood from Arm, Right Updated: 06/12/24 2134    CBC & Differential [[id number]]  (Abnormal) Collected: 06/12/24 2112    Specimen: Blood Updated: 06/12/24 2133    Narrative:      The following orders were created for panel order CBC & Differential.  Procedure                               Abnormality         Status                     ---------                               -----------         ------                     CBC Auto Differential[[id number]]        Abnormal            Final result                 Please view results for these tests on the individual orders.    CBC Auto Differential [[id number]]  (Abnormal) Collected: 06/12/24 2112    Specimen: Blood Updated: 06/12/24 2133     WBC 8.62 10*3/mm3      RBC 3.10 10*6/mm3      Hemoglobin 8.9 g/dL      Hematocrit 29.3 %      MCV 94.5 fL      MCH 28.7 pg      MCHC 30.4 g/dL      RDW 18.7 %      RDW-SD 63.2 fl      MPV 10.5 fL      Platelets 164 10*3/mm3      Neutrophil % 85.0 %      Lymphocyte % 8.5 %      Monocyte % 4.6 %      Eosinophil % 0.0 %      Basophil % 0.3 %      Immature Grans % 1.6 %      Neutrophils, Absolute 7.32 10*3/mm3      Lymphocytes, Absolute 0.73 10*3/mm3      Monocytes, Absolute 0.40 10*3/mm3      Eosinophils, Absolute 0.00 10*3/mm3      Basophils, Absolute 0.03 10*3/mm3      Immature Grans, Absolute 0.14 10*3/mm3      nRBC 0.7 /100 WBC     [place] Draw [[id number]] Collected: 06/12/24 2112    Specimen: Blood Updated: 06/12/24 2132    Narrative:      The following orders were created for panel order [place] Draw.  Procedure                               Abnormality         Status                     ---------                               -----------         ------                      Green Top (Gel)[[id number]]                                  Final result               Lavender Top[[id number]]                                     Final result               Red Top[[id number]]                                          Final result               [place] [facility] Jacob...[[id number]]                      Final result               Gray Top[[id number]]                                         Final result               Light Blue Top[[id number]]                                   Final result                 Please view results for these tests on the individual orders.    Green Top (Gel) [[id number]] Collected: 06/12/24 2112    Specimen: Blood Updated: 06/12/24 2132     Extra Tube Hold for add-ons.     Comment: Auto resulted.       [place] [facility] Bottle Set [[id number]] Collected: 06/12/24 2112    Specimen: Blood from Arm, Right Updated: 06/12/24 2132     Extra Tube Hold for add-ons.     Comment: Auto resulted.       Light Blue Top [[id number]] Collected: 06/12/24 2112    Specimen: Blood Updated: 06/12/24 2132     Extra Tube Hold for add-ons.     Comment: Auto resulted       Lavender Top [[id number]] Collected: 06/12/24 2112    Specimen: Blood Updated: 06/12/24 2132     Extra Tube hold for add-on     Comment: Auto resulted       Red Top [[id number]] Collected: 06/12/24 2112    Specimen: Blood Updated: 06/12/24 2132     Extra Tube Hold for add-ons.     Comment: Auto resulted.       Gray Top [[id number]] Collected: 06/12/24 2112    Specimen: Blood Updated: 06/12/24 2132     Extra Tube Hold for add-ons.     Comment: Auto resulted.             Imaging Results (Last 72 Hours)       Procedure Component Value Units Date/Time    CT Head Without Contrast [[id number]] Collected: 06/13/24 0618     Updated: 06/13/24 0628    Narrative:      EXAMINATION: CT HEAD WO CONTRAST-      6/12/2024 8:59 PM     HISTORY: altered mentation     In order to have a CT radiation dose as low as reasonably  achievable  Automated Exposure Control was utilized for adjustment of the mA and/or  KV according to patient size.     Total DLP = 736.68 mGy.cm     The CT scan of the head is performed without intravenous contrast  enhancement.     Images are acquired in axial plane and subsequent reconstruction in  coronal and sagittal planes.     The comparison is made with the previous study dated 8/22/2023.     There is no evidence of a mass. There is no midline shift.     There is no evidence of intracranial hemorrhage or hematoma.     Moderately dilated ventricles, basal cisterns and the cortical sulci are  similar to the previous study representing chronic volume loss.     Scattered areas of chronic white matter ischemia bilaterally are similar  to the previous study. The gray-white matter differentiation is  maintained.     A small empty sella turcica is seen.     Posterior fossa structures are unremarkable.     Images reviewed in bone window show no displaced acute skull fracture.  There is mucosal thickening involving the ethmoid maxillary and sphenoid  sinuses with left-sided mastoid effusion. Frontal sinuses are poorly  developed.       Impression:      1. No acute intracranial abnormality.  2. Chronic sinusitis and left mastoid effusion.     The above study was initially reviewed and reported by [facility]. I do not  find any discrepancies.                                      This report was signed and finalized on 6/13/2024 6:25 AM by Dr. Jose Patton MD.       XR Chest 1 View [[id number]] Collected: 06/12/24 2211     Updated: 06/12/24 2214    Narrative:      XR CHEST 1 VW- 6/12/2024 8:35 PM     HISTORY: fever       COMPARISON: Chest x-ray dated 5/24/2024     FINDINGS:  Upright frontal radiograph of the chest was obtained     No lung consolidation. No pleural effusion or pneumothorax. The  cardiomediastinal silhouette and pulmonary vascularity are within normal  limits. The osseous structures and surrounding soft  tissues demonstrate  no acute abnormality. Left chest wall dual-chamber pacemaker.       Impression:      1.  Stable chest exam without acute process. No visualized infiltrate.     This report was signed and finalized on 6/12/2024 10:11 PM by Dr Riki David.                  ASSESSMENT/PLAN       Examination and evaluation of wound(s) was performed.    DIAGNOSIS:   Open wound of right buttock with fat layer exposed  Status post cyst removal    Altered mental status    UTI (urinary tract infection)        PLAN:   Orders placed for wound care and pressure/moisture management as listed below.   Dolphin mattress ordered.         Start     Ordered    06/13/24 1204  Wound Care Incision  Daily      Question Answer Comment   Wound Locations Right buttock    Wound Care Instructions Normal saline.  Apply Opticell Ag rope to seal wound.  Do not packed tightly.  Cover with silicone border foam dressing.  Change daily.    Cleanse Normal Saline    Intervention Other    Other Opticell Ag rope    Dressing: Silicone Border Dressing        06/13/24 1203    06/13/24 0824  Turn Patient  Now Then Every 2 Hours         06/13/24 0823    06/13/24 0824  Elevate Heels Off of Bed  Until Discontinued         06/13/24 0823    06/13/24 0824  Use Seat Cushion When Up In Chair  Continuous         06/13/24 0823    06/13/24 0824  Silicone Border Dressing to Bony Prominences  Per Order Details        Comments: Apply silicone foam border dressing per protocol to sacral spine/bilateral heels for protection.  Nursing to change dressing every 3 days and PRN if soiled. Nursing is to peel back dressing with every assessment to assess skin underneath dressing. No barrier cream under dressing.    06/13/24 0823    06/13/24 0824  Use Repositioning Wedge to Position Patient  Continuous        Comments: Use Comfort Glide repositioning sheet and wedges to position patient.    06/13/24 0823    06/13/24 0000  Ambulatory Referral to Wound Clinic         06/13/24  1204    Unscheduled  Wound Care  As Needed      Question:  Wound Care Instructions  Answer:  Apply Moisture Barrier After Any Incontinence    06/13/24 0823                      Discussed findings and treatment plan including risks, benefits, and treatment options with patient  in detail. Patient agreed with treatment plan.      This document has been electronically signed by DANIELA Mullins on 6/13/2024 11:48 CDT

## 2024-06-13 NOTE — ED NOTES
Nursing report ED to floor  Tawny Shin  68 y.o.  female    HPI:   Chief Complaint   Patient presents with    Fever       Admitting doctor:   Wes Andrews MD    Consulting provider(s):  Consults       Date and Time Order Name Status Description    5/29/2024 12:17 PM Inpatient Cardiology Consult Completed     5/24/2024  2:39 PM Inpatient Gastroenterology Consult Completed     5/24/2024  1:45 PM Inpatient Infectious Diseases Consult Completed              Admitting diagnosis:   The primary encounter diagnosis was Acute UTI (urinary tract infection). A diagnosis of Altered mental status, unspecified altered mental status type was also pertinent to this visit.    Code status:   Current Code Status       Date Active Code Status Order ID Comments User Context       Prior            Allergies:   Atorvastatin, Amoxicillin, Escitalopram, Nabumetone, Niacin er, Penicillin g, Penicillins, and Simvastatin    Intake and Output  No intake or output data in the 24 hours ending 06/13/24 0001    Weight:       06/12/24  2105   Weight: 95.3 kg (210 lb 1.6 oz)       Most recent vitals:   Vitals:    06/12/24 2237 06/12/24 2244 06/12/24 2346 06/13/24 0000   BP:   156/78 156/78   BP Location:       Patient Position:       Pulse: 104 103 96 96   Resp: 26 26 28 24   Temp:    99 °F (37.2 °C)   TempSrc:    Oral   SpO2: 95% 100% 96% 97%   Weight:       Height:         Oxygen Therapy: .    Active LDAs/IV Access:   Lines, Drains & Airways       Active LDAs       Name Placement date Placement time Site Days    Peripheral IV 06/12/24 1350 Right Antecubital 06/12/24  1350  Antecubital  less than 1                    Labs (abnormal labs have a star):   Labs Reviewed   COMPREHENSIVE METABOLIC PANEL - Abnormal; Notable for the following components:       Result Value    Glucose 132 (*)     Creatinine 1.13 (*)     Calcium 8.5 (*)     Albumin 3.3 (*)     eGFR 52.4 (*)     All other components within normal limits    Narrative:     GFR Normal  >60  Chronic Kidney Disease <60  Kidney Failure <15     URINALYSIS W/ MICROSCOPIC IF INDICATED (NO CULTURE) - Abnormal; Notable for the following components:    Appearance, UA Turbid (*)     Blood, UA Moderate (2+) (*)     Protein, UA 30 mg/dL (1+) (*)     Leuk Esterase, UA Large (3+) (*)     Nitrite, UA Positive (*)     All other components within normal limits   CBC WITH AUTO DIFFERENTIAL - Abnormal; Notable for the following components:    RBC 3.10 (*)     Hemoglobin 8.9 (*)     Hematocrit 29.3 (*)     MCHC 30.4 (*)     RDW 18.7 (*)     RDW-SD 63.2 (*)     Neutrophil % 85.0 (*)     Lymphocyte % 8.5 (*)     Monocyte % 4.6 (*)     Eosinophil % 0.0 (*)     Immature Grans % 1.6 (*)     Neutrophils, Absolute 7.32 (*)     Immature Grans, Absolute 0.14 (*)     nRBC 0.7 (*)     All other components within normal limits   URINALYSIS, MICROSCOPIC ONLY - Abnormal; Notable for the following components:    RBC, UA 3-5 (*)     WBC, UA Too Numerous to Count (*)     Bacteria, UA 3+ (*)     Squamous Epithelial Cells, UA 3-6 (*)     All other components within normal limits   COVID-19 AND FLU A/B, NP SWAB IN TRANSPORT MEDIA 1 HR TAT - Normal    Narrative:     Fact sheet for providers: https://www.fda.gov/media/790944/download    Fact sheet for patients: https://www.fda.gov/media/296751/download    Test performed by PCR.   URINE DRUG SCREEN - Normal    Narrative:     Cutoff For Drugs Screened:    Amphetamines               500 ng/ml  Barbiturates               200 ng/ml  Benzodiazepines            150 ng/ml  Cocaine                    150 ng/ml  Methadone                  200 ng/ml  Opiates                    100 ng/ml  Phencyclidine               25 ng/ml  THC                         50 ng/ml  Methamphetamine            500 ng/ml  Tricyclic Antidepressants  300 ng/ml  Oxycodone                  100 ng/ml  Buprenorphine               10 ng/ml    The normal value for all drugs tested is negative. This report includes unconfirmed  screening results, with the cutoff values listed, to be used for medical treatment purposes only.  Unconfirmed results must not be used for non-medical purposes such as employment or legal testing.  Clinical consideration should be applied to any drug of abuse test, particularly when unconfirmed results are used.     LACTIC ACID, PLASMA - Normal   FENTANYL, URINE - Normal    Narrative:     Negative Threshold:      Fentanyl 5 ng/mL     The normal value for the drug tested is negative. This report includes final unconfirmed screening results to be used for medical treatment purposes only. Unconfirmed results must not be used for non-medical purposes such as employment or legal testing. Clinical consideration should be applied to any drug of abuse test, particularly when unconfirmed results are used.          COVID PRE-OP / PRE-PROCEDURE SCREENING ORDER (NO ISOLATION)    Narrative:     The following orders were created for panel order COVID PRE-OP / PRE-PROCEDURE SCREENING ORDER (NO ISOLATION) - Swab, Nasopharynx.  Procedure                               Abnormality         Status                     ---------                               -----------         ------                     COVID-19 and FLU A/B PCR...[[id number]]  Normal              Final result                 Please view results for these tests on the individual orders.   BLOOD CULTURE   BLOOD CULTURE   URINE CULTURE   RAINBOW DRAW    Narrative:     The following orders were created for panel order [place] Draw.  Procedure                               Abnormality         Status                     ---------                               -----------         ------                     Green Top (Gel)[[id number]]                                  Final result               Lavender Top[[id number]]                                     Final result               Red Top[[id number]]                                          Final result               [place] Blood  Culture Jacob...[[id number]]                      Final result               Gray Top[[id number]]                                         Final result               Light Blue Top[[id number]]                                   Final result                 Please view results for these tests on the individual orders.   GREEN TOP   LAVENDER TOP   RED TOP   RAINBOW BLOOD CULTURE BOTTLES - 1 SET   GRAY TOP   LIGHT BLUE TOP   CBC AND DIFFERENTIAL    Narrative:     The following orders were created for panel order CBC & Differential.  Procedure                               Abnormality         Status                     ---------                               -----------         ------                     CBC Auto Differential[[id number]]        Abnormal            Final result                 Please view results for these tests on the individual orders.       Meds given in ED:   Medications   sodium chloride 0.9 % flush 10 mL (10 mL Intravenous Given 6/12/24 2200)   sodium chloride 0.9 % bolus 1,000 mL (1,000 mL Intravenous New Bag 6/12/24 2344)   methylPREDNISolone sodium succinate (SOLU-Medrol) injection 125 mg (125 mg Intravenous Given 6/12/24 2200)   ipratropium-albuterol (DUO-NEB) nebulizer solution 3 mL (3 mL Nebulization Given 6/12/24 2237)   sodium chloride 0.9 % bolus 1,000 mL (0 mL Intravenous Stopped 6/12/24 2318)   cefTRIAXone (ROCEPHIN) 1,000 mg in sodium chloride 0.9 % 100 mL MBP (0 mg Intravenous Stopped 6/12/24 2318)           NIH Stroke Scale:       Isolation/Infection(s):  No active isolations   MRSA, MDR Pseudomonas, CR Pseudomonas CRPA, CRE     COVID Testing  Collected .  Resulted .    Nursing report ED to floor:  Mental status: .  Ambulatory status: .  Precautions: .    ED nurse phone extentsion- ..

## 2024-06-13 NOTE — PROGRESS NOTES
[facility] Medicine Services  INPATIENT PROGRESS NOTE    Patient Name: Tawny Shin  Date of Admission: 6/12/2024  Today's Date: 06/13/24  Length of Stay: 0  Primary Care Physician: Moises Oliveira MD    Subjective   Chief Complaint: Altered mental status  HPI   Patient alert and oriented x 3 today.  No distress or ill-appearing.  She had complained of having difficulty with swallowing big pills over the last week and reduced oral intake due to this.  She had an esophageal dilatation 2 weeks ago which was incomplete.    Review of Systems   All pertinent negatives and positives are as above. All other systems have been reviewed and are negative unless otherwise stated.     Objective    Temp:  [98.2 °F (36.8 °C)-99.6 °F (37.6 °C)] 98.2 °F (36.8 °C)  Heart Rate:  [] 70  Resp:  [16-28] 18  BP: (122-179)/(53-78) 122/56  Physical Exam  Vitals reviewed.   Constitutional:       General: She is not in acute distress.     Appearance: She is well-developed. She is not toxic-appearing.   HENT:      Head: Normocephalic and atraumatic.      Right Ear: External ear normal.      Left Ear: External ear normal.      Mouth/Throat:      Mouth: Mucous membranes are dry.      Pharynx: Oropharynx is clear.   Eyes:      General:         Right eye: No discharge.         Left eye: No discharge.      Extraocular Movements: Extraocular movements intact.      Conjunctiva/sclera: Conjunctivae normal.      Pupils: Pupils are equal, round, and reactive to light.   Neck:      Vascular: No JVD.   Cardiovascular:      Rate and Rhythm: Normal rate and regular rhythm.      Pulses: Normal pulses.      Heart sounds: Normal heart sounds. No murmur heard.     No friction rub. No gallop.   Pulmonary:      Effort: Pulmonary effort is normal. No respiratory distress.      Breath sounds: No stridor. No wheezing, rhonchi or rales.   Chest:      Chest wall: No tenderness.   Abdominal:      General: Bowel sounds  "are normal. There is no distension.      Palpations: Abdomen is soft.      Tenderness: There is no abdominal tenderness. There is no guarding or rebound.      Hernia: No hernia is present.   Musculoskeletal:         General: No swelling, tenderness or deformity. Normal range of motion.      Cervical back: Normal range of motion and neck supple. No rigidity or tenderness. No muscular tenderness.      Right lower leg: No edema.      Left lower leg: No edema.   Skin:     General: Skin is warm and dry.      Findings: No erythema or rash.   Neurological:      General: No focal deficit present.      Mental Status: She is alert and oriented to person, place, and time.      Cranial Nerves: No cranial nerve deficit.      Sensory: No sensory deficit.      Motor: No weakness or abnormal muscle tone.      Deep Tendon Reflexes: Reflexes normal.   Psychiatric:         Mood and Affect: Mood normal.         Behavior: Behavior normal.     Results Review:  I have reviewed the labs, radiology results, and diagnostic studies.    Laboratory Data:   Results from last 7 days   Lab Units 06/12/24  2112   WBC 10*3/mm3 8.62   HEMOGLOBIN g/dL 8.9*   HEMATOCRIT % 29.3*   PLATELETS 10*3/mm3 164        Results from last 7 days   Lab Units 06/13/24  0441 06/12/24  2112   SODIUM mmol/L 138 138   POTASSIUM mmol/L 4.0 3.7   CHLORIDE mmol/L 102 100   CO2 mmol/L 25.0 26.0   BUN mg/dL 18 19   CREATININE mg/dL 1.09* 1.13*   CALCIUM mg/dL 8.1* 8.5*   BILIRUBIN mg/dL  --  0.5   ALK PHOS U/L  --  77   ALT (SGPT) U/L  --  23   AST (SGOT) U/L  --  27   GLUCOSE mg/dL 155* 132*       Culture Data:   No results found for: \"BLOODCX\", \"URINECX\", \"WOUNDCX\", \"MRSACX\", \"RESPCX\", \"STOOLCX\"    Radiology Data:   Imaging Results (Last 24 Hours)       Procedure Component Value Units Date/Time    CT Head Without Contrast [[id number]] Collected: 06/13/24 0618     Updated: 06/13/24 0628    Narrative:      EXAMINATION: CT HEAD WO CONTRAST-      6/12/2024 8:59 PM     HISTORY: " altered mentation     In order to have a CT radiation dose as low as reasonably achievable  Automated Exposure Control was utilized for adjustment of the mA and/or  KV according to patient size.     Total DLP = 736.68 mGy.cm     The CT scan of the head is performed without intravenous contrast  enhancement.     Images are acquired in axial plane and subsequent reconstruction in  coronal and sagittal planes.     The comparison is made with the previous study dated 8/22/2023.     There is no evidence of a mass. There is no midline shift.     There is no evidence of intracranial hemorrhage or hematoma.     Moderately dilated ventricles, basal cisterns and the cortical sulci are  similar to the previous study representing chronic volume loss.     Scattered areas of chronic white matter ischemia bilaterally are similar  to the previous study. The gray-white matter differentiation is  maintained.     A small empty sella turcica is seen.     Posterior fossa structures are unremarkable.     Images reviewed in bone window show no displaced acute skull fracture.  There is mucosal thickening involving the ethmoid maxillary and sphenoid  sinuses with left-sided mastoid effusion. Frontal sinuses are poorly  developed.       Impression:      1. No acute intracranial abnormality.  2. Chronic sinusitis and left mastoid effusion.     The above study was initially reviewed and reported by [facility]. I do not  find any discrepancies.                                      This report was signed and finalized on 6/13/2024 6:25 AM by Dr. Jose Patton MD.       XR Chest 1 View [[id number]] Collected: 06/12/24 2211     Updated: 06/12/24 2214    Narrative:      XR CHEST 1 VW- 6/12/2024 8:35 PM     HISTORY: fever       COMPARISON: Chest x-ray dated 5/24/2024     FINDINGS:  Upright frontal radiograph of the chest was obtained     No lung consolidation. No pleural effusion or pneumothorax. The  cardiomediastinal silhouette and pulmonary  vascularity are within normal  limits. The osseous structures and surrounding soft tissues demonstrate  no acute abnormality. Left chest wall dual-chamber pacemaker.       Impression:      1.  Stable chest exam without acute process. No visualized infiltrate.     This report was signed and finalized on 6/12/2024 10:11 PM by Dr Riki David.               I have reviewed the patient's current medications.     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  Will be concerned with progression of dysphagia leading again to dehydration and poor oral intake.    Medications reviewed    DVT prophylaxis patient anticoagulated    Medical Decision Making  Number and Complexity of problems: 4 complex medical problems  Differential Diagnosis: See above    Conditions and Status        Condition is improving.     MDM Data  External documents reviewed: [email or web address]  Cardiac tracing (EKG, telemetry) interpretation: -  Radiology interpretation: -  Labs reviewed: -  Any tests that were considered but not ordered: -     Decision rules/scores evaluated (example [id number], Wells, etc): -     Discussed with: Patient     Care Planning  Shared decision making: Patient  Code status and discussions: DNR/DNI    Disposition  Social Determinants of Health that  impact treatment or disposition: none  I expect the patient to be discharged to home in 1-2 days.     Electronically signed by Kris Cade MD, 06/13/24, 16:41 CDT.

## 2024-06-13 NOTE — PLAN OF CARE
Goal Outcome Evaluation:  Plan of Care Reviewed With: patient, caregiver, other (see comments) (sitter)        Progress: improving  Outcome Evaluation: RDN assessment completed per wound protocol. Pt reports good appetite. Pt and sitter report lunch was cold, tray was left outside of room due to contact precautions. Pt stated she likes soft foods, but ordered a hamburger and chips at dinner. Will cont current Regular diet and follow for tolerance. Pt agreeable to Boost Glucose control in am. Does not want Mirza at this time. Oral intake insufficient at this time.

## 2024-06-13 NOTE — ED PROVIDER NOTES
Subjective   History of Present Illness  Pt presents to the  with report of AMS/fever.  Pt reportedly had a temp of 102.8 at home today.  Had an echocardiogram today and wasn't feeling well - following this, family states she has gotten worse.  Had some vomiting earlier after taking medications.  No known sick contacts.  No productive cough at home.  Pt not ordinarily confused per family.        Review of Systems   Constitutional:  Positive for chills and fever.   Respiratory:  Negative for cough and shortness of breath.    Gastrointestinal:  Positive for nausea and vomiting.   Genitourinary:  Negative for dysuria.   Skin:         Wound to R buttock region from recent cyst removal per family   Psychiatric/Behavioral:  Positive for confusion.    All other systems reviewed and are negative.      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    Elevated cholesterol     Eustachian tube dysfunction     Heart disease     Herpes simplex     History of transfusion     Hyperlipidemia     Hypertension     Hypothyroidism 12/23/2020    Intrinsic asthma 12/23/2020    Knee dislocation     Labral tear of right hip joint     Laryngitis sicca     Laryngitis, chronic     Left carotid bruit 03/09/2016    MI (myocardial infarction)     Myalgia due to statin 06/25/2019    Open wound of right hip 09/14/2021    Osteomyelitis of right femur 07/06/2021    Otorrhea     Pacemaker 11/17/2016    Primary osteoarthritis of left  "knee 12/23/2020    Psoriasis vulgaris 12/23/2020    S/P coronary artery stent placement 03/09/2016    Sensorineural hearing loss     Seropositive rheumatoid arthritis of multiple sites 12/27/2019    Overview:  -myochrysine '93-'96 -methotrexate '96--->11/98;r/s  restarted 2/99--> 8/14 (anemia) -sulfasalazine- not effective -penicillamine 6/98-->10/98; no effect -leflunomide 11/98--> - Humira '13-->didn't take - Enbrel 12/14-->3/15- no effect!   Last Assessment & Plan:  - \"aching all over\" because she had to be off her anti-rheumatic drugs for 2 weeks in preparation for her R knee surgery - he    Sick sinus syndrome 12/27/2019    Sjogren's disease     Spondylolisthesis of lumbar region 01/17/2018    Syncope, recurrent 02/08/2021    Urinary tract infection     UTI (urinary tract infection) 04/19/2024       Allergies   Allergen Reactions    Atorvastatin Other (See Comments)     LEG CRAMPS      Amoxicillin Rash    Escitalopram Rash    Nabumetone Rash    Niacin Er Rash    Penicillin G Rash    Penicillins Rash    Simvastatin Rash       Past Surgical History:   Procedure Laterality Date    A-V CARDIAC PACEMAKER INSERTION  2016    ATRIAL CARDIAC PACEMAKER INSERTION      CARDIAC CATHETERIZATION      CATARACT EXTRACTION      CERVICAL CORPECTOMY N/A 3/3/2021    Procedure: CERVICAL 6 CORPECTOMY WITH TITANIUM CAGE WITH NEURO MONITORING;  Surgeon: Bandar Shea MD;  Location: [facility] OR;  Service: Neurosurgery;  Laterality: N/A;    COLONOSCOPY  11/08/2011    One fold in the ascending colon which showed ulcer otherwise normal exam    COLONOSCOPY  11/12/2004    Normal exam repeat in five years    CORONARY ANGIOPLASTY WITH STENT PLACEMENT      X 2; 2013 & 2014    ENDOSCOPY  07/10/2014    Normal exam    ENDOSCOPY N/A 5/30/2024    Procedure: ESOPHAGOGASTRODUODENOSCOPY WITH ANESTHESIA;  Surgeon: Taiwo Sanchez MD;  Location: [facility] ENDOSCOPY;  Service: Gastroenterology;  Laterality: N/A;  preop; dysphagia  postop: balloon " dilation  PCP Jesus Manuel jeff    FLAP LEG Right 9/14/2021    Procedure: RIGHT GLUTEAL FASCIOCUTANEOUS ADVANCEMENT FLAP AND RIGHT TENSOR FASCIAL JESSICA FLAP;  Surgeon: Amadeo Turner MD;  Location:  [facility] OR;  Service: Plastics;  Laterality: Right;    HIP ABDUCTION TENOTOMY BILATERAL Right 1/14/2021    Procedure: RIGHT HIP GLUTEUS MEDLUS / MINIMUS REPAIR, POSSIBLE ACHILLES ALLOGRAFT;  Surgeon: Nino Carlson MD;  Location:  [facility] OR;  Service: Orthopedics;  Laterality: Right;    INCISION AND DRAINAGE ABSCESS Right 6/4/2022    Procedure: INCISION AND DRAINAGE ABSCESS right hip;  Surgeon: Magda Salcido MD;  Location:  [facility] OR;  Service: General;  Laterality: Right;    INCISION AND DRAINAGE ABSCESS Right 6/10/2022    Procedure: RIGHT HIP INCISION AND DRAINAGE. MD NEEDS 3L VANC IRRIGATION, CURRETTES, DAICANS, KERLEX ROLLS;  Surgeon: Amadeo Turner MD;  Location:  [facility] OR;  Service: Plastics;  Laterality: Right;    INCISION AND DRAINAGE HIP Right 2/9/2021    Procedure: HIP INCISION AND DRAINAGE;  Surgeon: Nino Carlson MD;  Location:  [facility] OR;  Service: Orthopedics;  Laterality: Right;    INCISION AND DRAINAGE LEG Right 10/24/2021    Procedure: INCISION AND DRAINAGE LOWER EXTREMITY;  Surgeon: Amadeo Turner MD;  Location:  [facility] OR;  Service: Plastics;  Laterality: Right;    INCISION AND DRAINAGE OF WOUND Right 7/8/2021    Procedure: INCISION AND DRAINAGE WOUND RIGHT HIP;  Surgeon: James Huntley MD;  Location:  [facility] OR;  Service: Orthopedics;  Laterality: Right;    JOINT REPLACEMENT      KYPHOPLASTY WITH BIOPSY Bilateral 10/26/2021    Procedure: THOARCIC 12 KYPHOPLASTY WITH BIOPSY;  Surgeon: Bandar Shea MD;  Location:  [facility] OR;  Service: Neurosurgery;  Laterality: Bilateral;    LEG DEBRIDEMENT Right 9/14/2021    Procedure: DEBRIDEMENT OF RIGHT HIP WOUND, RIGHT GLUTEAL FASCIOCUTANEOUS ADVANCEMENT FLAP AND RIGHT TENSOR FASCIAL JESSICA FLAP;  Surgeon: Amadeo Turner MD;  Location:  [facility]  OR;  Service: Plastics;  Laterality: Right;    LUMBAR DISCECTOMY Right 3/23/2021    Procedure: LUMBAR DISCECTOMY MICRO, Lumbar 1/2 right;  Surgeon: Bandar Shea MD;  Location:  [facility] OR;  Service: Neurosurgery;  Laterality: Right;    LUMBAR FUSION N/A 1/19/2018    Procedure: L3-4,L4-5 DECOMPRESSION, POSTERIOR SPINAL FUSION WITH INSTRUMENTATION;  Surgeon: Fortino Oropeza MD;  Location:  [facility] OR;  Service:     LUMBAR LAMINECTOMY WITH FUSION Left 1/17/2018    Procedure: LEFT L3-4 L4-5 LATERAL LUMBAR INTERBODY FUSION;  Surgeon: Fortino Oropeza MD;  Location:  [facility] OR;  Service:     MASS EXCISION Right 4/23/2024    Procedure: RIGHT BUTTOCK MASS EXCISION;  Surgeon: Moises Keyes MD;  Location:  [facility] OR;  Service: General;  Laterality: Right;    MYRINGOTOMY W/ TUBES  09/04/2014    TUBES NO LONGER IN PLACE    OTHER SURGICAL HISTORY      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 so hardware was removed and spacers were placed    REPLACEMENT TOTAL KNEE Right        Family History   Problem Relation Age of Onset    Cancer Mother         Lung cancer    Heart disease Father         Doied of heart. Attack       Social History     Socioeconomic History    Marital status:    Tobacco Use    Smoking status: Never     Passive exposure: Never    Smokeless tobacco: Never   Vaping Use    Vaping status: Never Used   Substance and Sexual Activity    Alcohol use: No    Drug use: Never    Sexual activity: Defer     Birth control/protection: Abstinence           Objective   Physical Exam  Vitals and nursing note reviewed.   Constitutional:       Appearance: Normal appearance.   HENT:      Head: Normocephalic.      Nose: Nose normal.      Mouth/Throat:      Mouth: Mucous membranes are moist.   Eyes:      Extraocular Movements: Extraocular movements intact.      Conjunctiva/sclera: Conjunctivae normal.      Pupils: Pupils are equal, round, and reactive to light.   Cardiovascular:      Rate and Rhythm: Normal rate and regular  rhythm.      Pulses: Normal pulses.      Heart sounds: Normal heart sounds.   Pulmonary:      Effort: Pulmonary effort is normal.      Breath sounds: Normal breath sounds.   Abdominal:      General: Abdomen is flat. Bowel sounds are normal.      Palpations: Abdomen is soft.   Musculoskeletal:      Cervical back: Normal range of motion and neck supple.      Right lower leg: Edema present.      Left lower leg: Edema present.   Skin:     Capillary Refill: Capillary refill takes less than 2 seconds.      Comments: R buttock with + bandage - c/d/I.  Has wet to dry dressing in place.  No fluctuance/drainage.    Neurological:      Mental Status: She is alert. She is disoriented.      Motor: Weakness present.         Procedures           ED Course      Labs Reviewed   COMPREHENSIVE METABOLIC PANEL - Abnormal; Notable for the following components:       Result Value    Glucose 132 (*)     Creatinine 1.13 (*)     Calcium 8.5 (*)     Albumin 3.3 (*)     eGFR 52.4 (*)     All other components within normal limits    Narrative:     GFR Normal >60  Chronic Kidney Disease <60  Kidney Failure <15     URINALYSIS W/ MICROSCOPIC IF INDICATED (NO CULTURE) - Abnormal; Notable for the following components:    Appearance, UA Turbid (*)     Blood, UA Moderate (2+) (*)     Protein, UA 30 mg/dL (1+) (*)     Leuk Esterase, UA Large (3+) (*)     Nitrite, UA Positive (*)     All other components within normal limits   CBC WITH AUTO DIFFERENTIAL - Abnormal; Notable for the following components:    RBC 3.10 (*)     Hemoglobin 8.9 (*)     Hematocrit 29.3 (*)     MCHC 30.4 (*)     RDW 18.7 (*)     RDW-SD 63.2 (*)     Neutrophil % 85.0 (*)     Lymphocyte % 8.5 (*)     Monocyte % 4.6 (*)     Eosinophil % 0.0 (*)     Immature Grans % 1.6 (*)     Neutrophils, Absolute 7.32 (*)     Immature Grans, Absolute 0.14 (*)     nRBC 0.7 (*)     All other components within normal limits   URINALYSIS, MICROSCOPIC ONLY - Abnormal; Notable for the following  components:    RBC, UA 3-5 (*)     WBC, UA Too Numerous to Count (*)     Bacteria, UA 3+ (*)     Squamous Epithelial Cells, UA 3-6 (*)     All other components within normal limits   COVID-19 AND FLU A/B, NP SWAB IN TRANSPORT MEDIA 1 HR TAT - Normal    Narrative:     Fact sheet for providers: https://www.fda.gov/media/999364/download    Fact sheet for patients: https://www.fda.gov/media/877044/download    Test performed by PCR.   URINE DRUG SCREEN - Normal    Narrative:     Cutoff For Drugs Screened:    Amphetamines               500 ng/ml  Barbiturates               200 ng/ml  Benzodiazepines            150 ng/ml  Cocaine                    150 ng/ml  Methadone                  200 ng/ml  Opiates                    100 ng/ml  Phencyclidine               25 ng/ml  THC                         50 ng/ml  Methamphetamine            500 ng/ml  Tricyclic Antidepressants  300 ng/ml  Oxycodone                  100 ng/ml  Buprenorphine               10 ng/ml    The normal value for all drugs tested is negative. This report includes unconfirmed screening results, with the cutoff values listed, to be used for medical treatment purposes only.  Unconfirmed results must not be used for non-medical purposes such as employment or legal testing.  Clinical consideration should be applied to any drug of abuse test, particularly when unconfirmed results are used.     LACTIC ACID, PLASMA - Normal   FENTANYL, URINE - Normal    Narrative:     Negative Threshold:      Fentanyl 5 ng/mL     The normal value for the drug tested is negative. This report includes final unconfirmed screening results to be used for medical treatment purposes only. Unconfirmed results must not be used for non-medical purposes such as employment or legal testing. Clinical consideration should be applied to any drug of abuse test, particularly when unconfirmed results are used.          COVID PRE-OP / PRE-PROCEDURE SCREENING ORDER (NO ISOLATION)    Narrative:      The following orders were created for panel order COVID PRE-OP / PRE-PROCEDURE SCREENING ORDER (NO ISOLATION) - Swab, Nasopharynx.  Procedure                               Abnormality         Status                     ---------                               -----------         ------                     COVID-19 and FLU A/B PCR...[[id number]]  Normal              Final result                 Please view results for these tests on the individual orders.   BLOOD CULTURE   BLOOD CULTURE   URINE CULTURE   RAINBOW DRAW    Narrative:     The following orders were created for panel order Welch Draw.  Procedure                               Abnormality         Status                     ---------                               -----------         ------                     Green Top (Gel)[[id number]]                                  Final result               Lavender Top[[id number]]                                     Final result               Red Top[[id number]]                                          Final result               Welch Blood Culture Jacob...[[id number]]                      Final result               Gray Top[[id number]]                                         Final result               Light Blue Top[[id number]]                                   Final result                 Please view results for these tests on the individual orders.   GREEN TOP   LAVENDER TOP   RED TOP   RAINBOW BLOOD CULTURE BOTTLES - 1 SET   GRAY TOP   LIGHT BLUE TOP   CBC AND DIFFERENTIAL    Narrative:     The following orders were created for panel order CBC & Differential.  Procedure                               Abnormality         Status                     ---------                               -----------         ------                     CBC Auto Differential[[id number]]        Abnormal            Final result                 Please view results for these tests on the individual orders.     XR Chest 1 View   Final Result   1.   Stable chest exam without acute process. No visualized infiltrate.       This report was signed and finalized on 6/12/2024 10:11 PM by Dr Riki David.          CT Head Without Contrast    (Results Pending)                                              Medical Decision Making  Pt stable in EC - resting comfortably and in NAD.  No focal findings c/w stroke.  No evid of ICH.  She does have a UTI - was given rocephin 1g IV for this.  Family reports she has had confusion with these in the past and pt has hx of urosepsis.  Serum lactate normal but given findings she may have some early sepsis. Given IVFs.  Have d/w Dr. Andrews for admit/further mgmt.     Amount and/or Complexity of Data Reviewed  Labs: ordered.  Radiology: ordered.  ECG/medicine tests: ordered.    Risk  Prescription drug management.        Final diagnoses:   Acute UTI (urinary tract infection)   Altered mental status, unspecified altered mental status type       ED Disposition  ED Disposition       ED Disposition   Decision to Admit    Condition   --    Comment   --               No follow-up provider specified.       Medication List      No changes were made to your prescriptions during this visit.            Calvin Booth, DO  06/12/24 [id number]       Calvin Booth, DO  06/12/24 [id number]

## 2024-06-13 NOTE — H&P
[facility] Medicine Services  HISTORY AND PHYSICAL    Date of Admission: 6/12/2024  Primary Care Physician: Moises Oliveira MD    Subjective   Primary Historian: Caregiver at bedside    Chief Complaint: Altered mental status    History of Present Illness  Ms. Shin is a 68-year-old female from home with past medical history of COPD, asthma, right DVT, chronic pain syndrome, coronary artery disease, hyperlipidemia, hypertension, hypothyroidism, myocardial infarction, myalgia due to statin, osteoarthritis of left knee, psoriasis, Sjogren's disease and sick sinus syndrome, who was brought to the emergency room because of change in mental status, history was provided by caregiver at bedside.  Patient went to her cardiologist office today for echocardiogram, after which she came back, developed change in mental status [she was staring upwards to the hakeem and was not communicating], prompting emergency room visit.  In the emergency room, CT of the head was negative but urinalysis was supportive of urinary tract infection and patient was also found to be hypoxic and wheezing.  She was given ceftriaxone and steroid in the emergency room.  As of the time I saw patient, she was able to answer basic questions and also asked basic questions as well.      Review of Systems   Otherwise complete ROS reviewed and negative except as mentioned in the HPI.    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    Elevated cholesterol     Eustachian tube dysfunction     Heart disease     Herpes simplex     History of transfusion     Hyperlipidemia     Hypertension     Hypothyroidism 12/23/2020    Intrinsic asthma 12/23/2020    Knee dislocation     Labral tear of right hip joint     Laryngitis sicca     Laryngitis, chronic     Left carotid bruit 03/09/2016    MI (myocardial infarction)     Myalgia due to statin 06/25/2019    Open wound of right hip 09/14/2021    Osteomyelitis of right femur 07/06/2021    Otorrhea     Pacemaker 11/17/2016    Primary osteoarthritis of left knee 12/23/2020    Psoriasis vulgaris 12/23/2020    S/P coronary artery stent placement 03/09/2016    Sensorineural hearing loss     Seropositive rheumatoid arthritis of multiple sites 12/27/2019    Overview:  -myochrysine '93-'96 -methotrexate '96--->11/98;r/s  restarted 2/99--> 8/14 (anemia) -sulfasalazine- not effective -penicillamine 6/98-->10/98; no effect -leflunomide 11/98--> - Humira '13-->didn't take - Enbrel 12/14-->3/15- no effect!   Last Assessment & Plan:  - \"aching all over\" because she had to be off her anti-rheumatic drugs for 2 weeks in preparation for her R knee surgery - he    Sick sinus syndrome 12/27/2019    Sjogren's disease     Spondylolisthesis of lumbar region 01/17/2018    Syncope, recurrent 02/08/2021    Urinary tract infection     UTI (urinary tract infection) 04/19/2024     Past Surgical History:  Past Surgical History:   Procedure Laterality Date    A-V CARDIAC PACEMAKER INSERTION  2016    ATRIAL CARDIAC PACEMAKER INSERTION      CARDIAC CATHETERIZATION      CATARACT EXTRACTION      CERVICAL CORPECTOMY N/A 3/3/2021    Procedure: CERVICAL 6 CORPECTOMY WITH TITANIUM CAGE WITH NEURO MONITORING;  Surgeon: Bandar Shea MD;  Location: [facility];  Service: Neurosurgery;  Laterality: N/A;    COLONOSCOPY  11/08/2011    One fold in the ascending colon which showed ulcer otherwise " normal exam    COLONOSCOPY  11/12/2004    Normal exam repeat in five years    CORONARY ANGIOPLASTY WITH STENT PLACEMENT      X 2; 2013 & 2014    ENDOSCOPY  07/10/2014    Normal exam    ENDOSCOPY N/A 5/30/2024    Procedure: ESOPHAGOGASTRODUODENOSCOPY WITH ANESTHESIA;  Surgeon: Taiwo Sanchez MD;  Location: [facility] ENDOSCOPY;  Service: Gastroenterology;  Laterality: N/A;  preop; dysphagia  postop: balloon dilation  PCP Jesus Manuel jeff    FLAP LEG Right 9/14/2021    Procedure: RIGHT GLUTEAL FASCIOCUTANEOUS ADVANCEMENT FLAP AND RIGHT TENSOR FASCIAL JESSICA FLAP;  Surgeon: Amadeo Turner MD;  Location:  [facility] OR;  Service: Plastics;  Laterality: Right;    HIP ABDUCTION TENOTOMY BILATERAL Right 1/14/2021    Procedure: RIGHT HIP GLUTEUS MEDLUS / MINIMUS REPAIR, POSSIBLE ACHILLES ALLOGRAFT;  Surgeon: Nino Carlson MD;  Location:  [facility];  Service: Orthopedics;  Laterality: Right;    INCISION AND DRAINAGE ABSCESS Right 6/4/2022    Procedure: INCISION AND DRAINAGE ABSCESS right hip;  Surgeon: Magda Salcido MD;  Location: [facility] OR;  Service: General;  Laterality: Right;    INCISION AND DRAINAGE ABSCESS Right 6/10/2022    Procedure: RIGHT HIP INCISION AND DRAINAGE. MD NEEDS 3L VANC IRRIGATION, CURRETTES, DAICANS, KERLEX ROLLS;  Surgeon: Amadeo Turner MD;  Location:  [facility];  Service: Plastics;  Laterality: Right;    INCISION AND DRAINAGE HIP Right 2/9/2021    Procedure: HIP INCISION AND DRAINAGE;  Surgeon: Nino Carlson MD;  Location:  [facility];  Service: Orthopedics;  Laterality: Right;    INCISION AND DRAINAGE LEG Right 10/24/2021    Procedure: INCISION AND DRAINAGE LOWER EXTREMITY;  Surgeon: Amadeo Turner MD;  Location:  [facility];  Service: Plastics;  Laterality: Right;    INCISION AND DRAINAGE OF WOUND Right 7/8/2021    Procedure: INCISION AND DRAINAGE WOUND RIGHT HIP;  Surgeon: James Huntley MD;  Location:  [facility];  Service: Orthopedics;  Laterality: Right;    JOINT REPLACEMENT      KYPHOPLASTY  WITH BIOPSY Bilateral 10/26/2021    Procedure: THOARCIC 12 KYPHOPLASTY WITH BIOPSY;  Surgeon: Bandar Shea MD;  Location:  [facility];  Service: Neurosurgery;  Laterality: Bilateral;    LEG DEBRIDEMENT Right 9/14/2021    Procedure: DEBRIDEMENT OF RIGHT HIP WOUND, RIGHT GLUTEAL FASCIOCUTANEOUS ADVANCEMENT FLAP AND RIGHT TENSOR FASCIAL JESSICA FLAP;  Surgeon: Amadeo Turner MD;  Location:  [facility];  Service: Plastics;  Laterality: Right;    LUMBAR DISCECTOMY Right 3/23/2021    Procedure: LUMBAR DISCECTOMY MICRO, Lumbar 1/2 right;  Surgeon: Bandar Shea MD;  Location:  [facility];  Service: Neurosurgery;  Laterality: Right;    LUMBAR FUSION N/A 1/19/2018    Procedure: L3-4,L4-5 DECOMPRESSION, POSTERIOR SPINAL FUSION WITH INSTRUMENTATION;  Surgeon: Fortino Oropeza MD;  Location:  [facility];  Service:     LUMBAR LAMINECTOMY WITH FUSION Left 1/17/2018    Procedure: LEFT L3-4 L4-5 LATERAL LUMBAR INTERBODY FUSION;  Surgeon: Fortino Oropeza MD;  Location:  [facility];  Service:     MASS EXCISION Right 4/23/2024    Procedure: RIGHT BUTTOCK MASS EXCISION;  Surgeon: Moises Keyes MD;  Location:  [facility];  Service: General;  Laterality: Right;    MYRINGOTOMY W/ TUBES  09/04/2014    TUBES NO LONGER IN PLACE    OTHER SURGICAL HISTORY      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 so hardware was removed and spacers were placed    REPLACEMENT TOTAL KNEE Right      Social History:  reports that she has never smoked. She has never been exposed to tobacco smoke. She has never used smokeless tobacco. She reports that she does not drink alcohol and does not use drugs.    Family History: family history includes Cancer in her mother; Heart disease in her father.       Allergies:  Allergies   Allergen Reactions    Atorvastatin Other (See Comments)     LEG CRAMPS      Amoxicillin Rash    Escitalopram Rash    Nabumetone Rash    Niacin Er Rash    Penicillin G Rash    Penicillins Rash    Simvastatin Rash       Medications:  Prior  to Admission medications    Medication Sig Start Date End Date Taking? Authorizing Provider   acetaminophen (TYLENOL) 325 MG tablet Take 2 tablets by mouth Every 6 (Six) Hours As Needed for Mild Pain (mild pain). Indications: Fever, Pain 6/4/22   Leila Daly MD   apixaban (Eliquis) 5 MG tablet tablet Take 1 tablet by mouth 2 (Two) Times a Day. 4/23/24   Nayan Hale DO   Ascorbic Acid (Vitamin C) 500 MG capsule Take 1 tablet by mouth Daily. Indications: Inadequate Vitamin C 5/16/24   Leila Daly MD   aspirin 81 MG EC tablet Take 1 tablet by mouth Daily. Indications: Buildup of Plaques in Large Arteries Leading to the Brain 6/1/24   Leila Daly MD   carvedilol (COREG) 25 MG tablet Take 1 tablet by mouth 2 (Two) Times a Day With Meals. Indications: High Blood Pressure Disorder 4/21/24   Leila Daly MD   Diclofenac Sodium (VOLTAREN) 1 % gel gel Apply 4 g topically to the appropriate area as directed 4 (Four) Times a Day As Needed (Mild pain). Indications: Joint Damage causing Pain and Loss of Function 11/4/21   Leila Daly MD   DULoxetine (CYMBALTA) 60 MG capsule Take 1 capsule by mouth Daily. Indications: Musculoskeletal Pain 4/21/24   Leila Daly MD   estradiol (ESTRACE) 0.1 MG/GM vaginal cream Insert 2 g into the vagina Daily. 5/31/24   Kris Cade MD   ferrous sulfate 324 (65 Fe) MG tablet delayed-release EC tablet Take 1 tablet by mouth Daily With Breakfast. Indications: Iron Deficiency 4/21/24   Leila Daly MD   folic acid (FOLVITE) 1 MG tablet Take 1 tablet by mouth Daily. Indications: Anemia From Inadequate Folic Acid 4/21/24   Leila Daly MD   furosemide (LASIX) 40 MG tablet Take 1 tablet by mouth Daily. Indications: Cardiac Failure, Edema 5/25/24   Leila Daly MD   isosorbide mononitrate (IMDUR) 60 MG 24 hr tablet Take 1 tablet by mouth Daily. Indications: Stable Angina Pectoris 4/21/24   Addy  MD Leila   leflunomide (ARAVA) 20 MG tablet Take 1 tablet by mouth every night at bedtime. Indications: Rheumatoid Arthritis 2/6/23   Moises Oliveira MD   levothyroxine (SYNTHROID, LEVOTHROID) 75 MCG tablet Take 1 tablet by mouth Daily. Indications: Underactive Thyroid 5/5/24   Leila Daly MD   lidocaine (Lidoderm) 5 % Place 1 patch on the skin as directed by provider Daily As Needed for Mild Pain. Remove & Discard patch within 12 hours or as directed by MD 4/9/24   Niki Palma APRN   losartan (COZAAR) 50 MG tablet Take 1 tablet by mouth Daily. Indications: High Blood Pressure Disorder 4/21/24   Leila Daly MD   methenamine (HIPREX) 1 g tablet Take 1 tablet by mouth 2 (Two) Times a Day With Meals for 90 days.  Patient not taking: Reported on 6/10/2024 5/31/24 8/29/24  Kris Cade MD   multivitamin with minerals tablet tablet Take 1 tablet by mouth Daily. 3/6/21   Taiwo Prado APRN   nitroglycerin (NITROSTAT) 0.4 MG SL tablet Place 1 tablet under the tongue Every 5 (Five) Minutes As Needed for Chest Pain. Take no more than 3 doses in 15 minutes. 4/21/24   Leila Daly MD   ondansetron (Zofran) 4 MG tablet Take 1 tablet by mouth Every 8 (Eight) Hours As Needed for Nausea or Vomiting. 1/22/24   Moises Oliveira MD   pantoprazole (Protonix) 40 MG EC tablet Take 1 tablet by mouth Daily for 90 days. 5/31/24 8/29/24  Kris Cade MD   predniSONE (DELTASONE) 5 MG tablet Take 1 tablet by mouth Daily. Indications: Acute Joint Inflammation in Gout 4/21/24   Leila Daly MD   spironolactone (ALDACTONE) 25 MG tablet Take 1 tablet by mouth Daily. 6/6/24   Anamaria White APRN   traMADol (ULTRAM) 50 MG tablet Take 1 tablet by mouth Every 12 (Twelve) Hours As Needed for Moderate Pain. Indications: Pain 5/16/24   Leila Daly MD   valACYclovir (VALTREX) 500 MG tablet Take 1 tablet by mouth Daily As Needed. Indications: Shingles 4/21/24    "Provider, MD Leila     I have utilized all available immediate resources to obtain, update, or review the patient's current medications (including all prescriptions, over-the-counter products, herbals, cannabis/cannabidiol products, and vitamin/mineral/dietary (nutritional) supplements).    Objective     Vital Signs: /78   Pulse 96   Temp 99 °F (37.2 °C) (Oral)   Resp 24   Ht 167.6 cm (66\")   Wt 95.3 kg (210 lb 1.6 oz)   LMP  (LMP Unknown)   SpO2 97%   BMI 33.91 kg/m²   Physical Exam   GEN: Awake, alert, interactive, in NAD  HEENT: Atraumatic, PERRLA, EOMI, Anicteric, Trachea midline  Lungs: Reduced breath sounds bilaterally, mild wheeze  Heart: RRR, +S1/s2, no rub  ABD: soft, nt/nd, +BS, no guarding/rebound  Extremities: mild pitting pedal edema bilaterally  Skin: no rashes or lesions  Neuro: AAOx3, no focal deficits  Psych: normal mood & affect      Results Reviewed:  Lab Results (last 24 hours)       Procedure Component Value Units Date/Time    Urine Culture - Urine, Straight Cath [[id number]] Collected: 06/12/24 2117    Specimen: Urine from Straight Cath Updated: 06/12/24 2247    Urinalysis With Microscopic If Indicated (No Culture) - Straight Cath [[id number]]  (Abnormal) Collected: 06/12/24 2117    Specimen: Urine from Straight Cath Updated: 06/12/24 2209     Color, UA Yellow     Appearance, UA Turbid     pH, UA 6.0     Specific Gravity, UA 1.015     Glucose, UA Negative     Ketones, UA Negative     Bilirubin, UA Negative     Blood, UA Moderate (2+)     Protein, UA 30 mg/dL (1+)     Leuk Esterase, UA Large (3+)     Nitrite, UA Positive     Urobilinogen, UA 0.2 E.U./dL    Urinalysis, Microscopic Only - Straight Cath [[id number]]  (Abnormal) Collected: 06/12/24 2117    Specimen: Urine from Straight Cath Updated: 06/12/24 2209     RBC, UA 3-5 /HPF      WBC, UA Too Numerous to Count /HPF      Bacteria, UA 3+ /HPF      Squamous Epithelial Cells, UA 3-6 /HPF      Yeast, UA Small/1+ Budding Yeast " /HPF      Hyaline Casts, UA 0-2 /LPF      Methodology Manual Light Microscopy    COVID PRE-OP / PRE-PROCEDURE SCREENING ORDER (NO ISOLATION) - Swab, Nasopharynx [[id number]]  (Normal) Collected: 06/12/24 2125    Specimen: Swab from Nasopharynx Updated: 06/12/24 2156    Narrative:      The following orders were created for panel order COVID PRE-OP / PRE-PROCEDURE SCREENING ORDER (NO ISOLATION) - Swab, Nasopharynx.  Procedure                               Abnormality         Status                     ---------                               -----------         ------                     COVID-19 and FLU A/B PCR...[[id number]]  Normal              Final result                 Please view results for these tests on the individual orders.    COVID-19 and FLU A/B PCR, 1 HR TAT - Swab, Nasopharynx [[id number]]  (Normal) Collected: 06/12/24 2125    Specimen: Swab from Nasopharynx Updated: 06/12/24 2156     COVID19 Not Detected     Influenza A PCR Not Detected     Influenza B PCR Not Detected    Narrative:      Fact sheet for providers: https://www.fda.gov/media/173953/download    Fact sheet for patients: https://www.fda.gov/media/867820/download    Test performed by PCR.    Comprehensive Metabolic Panel [[id number]]  (Abnormal) Collected: 06/12/24 2112    Specimen: Blood Updated: 06/12/24 2142     Glucose 132 mg/dL      BUN 19 mg/dL      Creatinine 1.13 mg/dL      Sodium 138 mmol/L      Potassium 3.7 mmol/L      Chloride 100 mmol/L      CO2 26.0 mmol/L      Calcium 8.5 mg/dL      Total Protein 7.0 g/dL      Albumin 3.3 g/dL      ALT (SGPT) 23 U/L      AST (SGOT) 27 U/L      Alkaline Phosphatase 77 U/L      Total Bilirubin 0.5 mg/dL      Globulin 3.7 gm/dL      A/G Ratio 0.9 g/dL      BUN/Creatinine Ratio 16.8     Anion Gap 12.0 mmol/L      eGFR 52.4 mL/min/1.73     Narrative:      GFR Normal >60  Chronic Kidney Disease <60  Kidney Failure <15      Urine Drug Screen - Straight Cath [[id number]]  (Normal) Collected: 06/12/24 2117     Specimen: Urine from Straight Cath Updated: 06/12/24 2140     THC, Screen, Urine Negative     Phencyclidine (PCP), Urine Negative     Cocaine Screen, Urine Negative     Methamphetamine, Ur Negative     Opiate Screen Negative     Amphetamine Screen, Urine Negative     Benzodiazepine Screen, Urine Negative     Tricyclic Antidepressants Screen Negative     Methadone Screen, Urine Negative     Barbiturates Screen, Urine Negative     Oxycodone Screen, Urine Negative     Buprenorphine, Screen, Urine Negative    Narrative:      Cutoff For Drugs Screened:    Amphetamines               500 ng/ml  Barbiturates               200 ng/ml  Benzodiazepines            150 ng/ml  Cocaine                    150 ng/ml  Methadone                  200 ng/ml  Opiates                    100 ng/ml  Phencyclidine               25 ng/ml  THC                         50 ng/ml  Methamphetamine            500 ng/ml  Tricyclic Antidepressants  300 ng/ml  Oxycodone                  100 ng/ml  Buprenorphine               10 ng/ml    The normal value for all drugs tested is negative. This report includes unconfirmed screening results, with the cutoff values listed, to be used for medical treatment purposes only.  Unconfirmed results must not be used for non-medical purposes such as employment or legal testing.  Clinical consideration should be applied to any drug of abuse test, particularly when unconfirmed results are used.      Lactic Acid, Plasma [[id number]]  (Normal) Collected: 06/12/24 2112    Specimen: Blood Updated: 06/12/24 2139     Lactate 1.0 mmol/L     Fentanyl, Urine - Straight Cath [[id number]]  (Normal) Collected: 06/12/24 2117    Specimen: Urine from Straight Cath Updated: 06/12/24 2139     Fentanyl, Urine Negative    Narrative:      Negative Threshold:      Fentanyl 5 ng/mL     The normal value for the drug tested is negative. This report includes final unconfirmed screening results to be used for medical treatment purposes only.  Unconfirmed results must not be used for non-medical purposes such as employment or legal testing. Clinical consideration should be applied to any drug of abuse test, particularly when unconfirmed results are used.           Blood Culture - Blood, Arm, Left [[id number]] Collected: 06/12/24 2120    Specimen: Blood from Arm, Left Updated: 06/12/24 2134    Blood Culture - Blood, Arm, Right [[id number]] Collected: 06/12/24 2112    Specimen: Blood from Arm, Right Updated: 06/12/24 2134    CBC & Differential [[id number]]  (Abnormal) Collected: 06/12/24 2112    Specimen: Blood Updated: 06/12/24 2133    Narrative:      The following orders were created for panel order CBC & Differential.  Procedure                               Abnormality         Status                     ---------                               -----------         ------                     CBC Auto Differential[[id number]]        Abnormal            Final result                 Please view results for these tests on the individual orders.    CBC Auto Differential [[id number]]  (Abnormal) Collected: 06/12/24 2112    Specimen: Blood Updated: 06/12/24 2133     WBC 8.62 10*3/mm3      RBC 3.10 10*6/mm3      Hemoglobin 8.9 g/dL      Hematocrit 29.3 %      MCV 94.5 fL      MCH 28.7 pg      MCHC 30.4 g/dL      RDW 18.7 %      RDW-SD 63.2 fl      MPV 10.5 fL      Platelets 164 10*3/mm3      Neutrophil % 85.0 %      Lymphocyte % 8.5 %      Monocyte % 4.6 %      Eosinophil % 0.0 %      Basophil % 0.3 %      Immature Grans % 1.6 %      Neutrophils, Absolute 7.32 10*3/mm3      Lymphocytes, Absolute 0.73 10*3/mm3      Monocytes, Absolute 0.40 10*3/mm3      Eosinophils, Absolute 0.00 10*3/mm3      Basophils, Absolute 0.03 10*3/mm3      Immature Grans, Absolute 0.14 10*3/mm3      nRBC 0.7 /100 WBC     [place] Draw [[id number]] Collected: 06/12/24 2112    Specimen: Blood Updated: 06/12/24 2132    Narrative:      The following orders were created for panel order [place]  Draw.  Procedure                               Abnormality         Status                     ---------                               -----------         ------                     Green Top (Gel)[[id number]]                                  Final result               Lavender Top[[id number]]                                     Final result               Red Top[[id number]]                                          Final result               [place] Blood Culture Jacob...[[id number]]                      Final result               Gray Top[[id number]]                                         Final result               Light Blue Top[[id number]]                                   Final result                 Please view results for these tests on the individual orders.    Green Top (Gel) [[id number]] Collected: 06/12/24 2112    Specimen: Blood Updated: 06/12/24 2132     Extra Tube Hold for add-ons.     Comment: Auto resulted.       [place] Blood Culture Bottle Set [[id number]] Collected: 06/12/24 2112    Specimen: Blood from Arm, Right Updated: 06/12/24 2132     Extra Tube Hold for add-ons.     Comment: Auto resulted.       Light Blue Top [[id number]] Collected: 06/12/24 2112    Specimen: Blood Updated: 06/12/24 2132     Extra Tube Hold for add-ons.     Comment: Auto resulted       Lavender Top [[id number]] Collected: 06/12/24 2112    Specimen: Blood Updated: 06/12/24 2132     Extra Tube hold for add-on     Comment: Auto resulted       Red Top [[id number]] Collected: 06/12/24 2112    Specimen: Blood Updated: 06/12/24 2132     Extra Tube Hold for add-ons.     Comment: Auto resulted.       Gray Top [[id number]] Collected: 06/12/24 2112    Specimen: Blood Updated: 06/12/24 2132     Extra Tube Hold for add-ons.     Comment: Auto resulted.             Imaging Results (Last 24 Hours)       Procedure Component Value Units Date/Time    CT Head Without Contrast [[id number]] Resulted: 06/12/24 2159     Updated: 06/12/24 2220    XR Chest 1 View  [[id number]] Collected: 06/12/24 2211     Updated: 06/12/24 2214    Narrative:      XR CHEST 1 VW- 6/12/2024 8:35 PM     HISTORY: fever       COMPARISON: Chest x-ray dated 5/24/2024     FINDINGS:  Upright frontal radiograph of the chest was obtained     No lung consolidation. No pleural effusion or pneumothorax. The  cardiomediastinal silhouette and pulmonary vascularity are within normal  limits. The osseous structures and surrounding soft tissues demonstrate  no acute abnormality. Left chest wall dual-chamber pacemaker.       Impression:      1.  Stable chest exam without acute process. No visualized infiltrate.     This report was signed and finalized on 6/12/2024 10:11 PM by Dr Riki David.             I have personally reviewed and interpreted the radiology studies and ECG obtained at time of admission.     Assessment / Plan   Assessment:   Active Hospital Problems    Diagnosis     **Altered mental status     UTI (urinary tract infection)        Treatment Plan  The patient will be admitted to my service here at [facility].     -Acute metabolic encephalopathy  Patient's change in mental status is likely secondary to ongoing urinary tract infection.  CT of the head was negative for any acute findings  She will be continued on antibiotics and monitor closely.    -Acute hypoxic respiratory failure/COPD  Patient has history of COPD, currently needing about 2 L of oxygen to maintain saturation above 92%.  She was given steroids in the emergency room.  She does not seem to be in acute exacerbation of COPD, will continue her home medications and low-dose Solu-Medrol at 40 mg daily.    -Acute urinary tract infection  Review of chart showed that patient had UTI last month caused by Proteus Mirabella's which was sensitive to ceftriaxone, she was given 1 dose of ceftriaxone in the emergency room.  We will continue ceftriaxone and follow culture reports.    Other chronic medical conditions-  History of  asthma  Right DVT  Chronic pain syndrome  Coronary artery disease   Hyperlipidemia  Hypertension  Hypothyroidism  Myocardial infarction  Myalgia due to statin  Osteoarthritis of left knee  Psoriasis  Sjogren's disease  Sick sinus syndrome    DVT prophylaxis-apixaban      Medical Decision Making  Number and Complexity of problems: High  Differential Diagnosis: COPD exacerbation, urinary tract infection,    Conditions and Status        Condition is improving.     [facility] Data  External documents reviewed: No  Cardiac tracing (EKG, telemetry) interpretation: Yes  Radiology interpretation: Yes  Labs reviewed: Yes  Any tests that were considered but not ordered: No     Decision rules/scores evaluated (example [id number], [place], etc): Yes     Discussed with: Patient and caregiver at bedside     Care Planning  Shared decision making: Yes  Code status and discussions: Yes    Disposition  Social Determinants of Health that impact treatment or disposition: No  Estimated length of stay is 2 to 3 days.     I confirmed that the patient's advanced care plan is present, code status is documented, and a surrogate decision maker is listed in the patient's medical record.     The patient's surrogate decision maker is son.     The patient was seen and examined by me on 6/12/2024 at 1140.    Electronically signed by Wes Andrews MD, 06/13/24, 00:21 CDT.

## 2024-06-13 NOTE — NURSING NOTE
[facility]  INPATIENT WOUND & OSTOMY CARE    Today's Date: 06/13/24    Patient Name: Tawny Shin  MRN: [id number]  CSN: [id number]  PCP: Moises Oliveira MD  Attending Provider: Kris Cade,*  Length of Stay: 0     I placed pressure injury prevention measure orders due to patient being at risk for skin breakdown.     Apply silicone foam border dressing per protocol to sacral spine/bilateral heels for protection.  Nursing to change dressing every 3 days and PRN if soiled. Nursing is to peel back dressing with every assessment to assess skin underneath dressing. No barrier cream under dressing.     Please reach out to wound care nurse if any skin issues arise.      This document has been electronically signed by Lea Romero RN on 6/13/2024 08:24 CDT

## 2024-06-13 NOTE — PLAN OF CARE
Goal Outcome Evaluation: Pt is alert and oriented, no c/o pain, room air, IV is clean dry and intact, no s/s of distress, Q2H turn, vitals are stable, family is at bedside           Progress: improving

## 2024-06-13 NOTE — TELEPHONE ENCOUNTER
----- Message from Viral Philip sent at 6/13/2024 12:25 PM CDT -----  Please let the patient know her echo results.  There is no significant change when compared to her previous echo in April.  Tell her that Ximena will discuss this further at her next appointment.

## 2024-06-14 ENCOUNTER — HOME CARE VISIT (OUTPATIENT)
Dept: HOME HEALTH SERVICES | Facility: CLINIC | Age: 69
End: 2024-06-14
Payer: MEDICARE

## 2024-06-14 PROBLEM — R13.19 ESOPHAGEAL DYSPHAGIA: Status: ACTIVE | Noted: 2024-06-14

## 2024-06-14 LAB
MAGNESIUM SERPL-MCNC: 2.3 MG/DL (ref 1.6–2.4)
QT INTERVAL: 320 MS
QTC INTERVAL: 427 MS

## 2024-06-14 PROCEDURE — G0378 HOSPITAL OBSERVATION PER HR: HCPCS

## 2024-06-14 PROCEDURE — 99214 OFFICE O/P EST MOD 30 MIN: CPT | Performed by: INTERNAL MEDICINE

## 2024-06-14 PROCEDURE — 25010000002 CEFTRIAXONE PER 250 MG: Performed by: INTERNAL MEDICINE

## 2024-06-14 RX ADMIN — Medication 10 ML: at 08:41

## 2024-06-14 RX ADMIN — METHENAMINE HIPPURATE 1 G: 1000 TABLET ORAL at 08:40

## 2024-06-14 RX ADMIN — FERROUS SULFATE TAB 325 MG (65 MG ELEMENTAL FE) 325 MG: 325 (65 FE) TAB at 08:39

## 2024-06-14 RX ADMIN — ASPIRIN 81 MG: 81 TABLET, COATED ORAL at 08:38

## 2024-06-14 RX ADMIN — LOSARTAN POTASSIUM 50 MG: 50 TABLET, FILM COATED ORAL at 08:39

## 2024-06-14 RX ADMIN — CARVEDILOL 25 MG: 25 TABLET, FILM COATED ORAL at 08:39

## 2024-06-14 RX ADMIN — FUROSEMIDE 40 MG: 40 TABLET ORAL at 08:39

## 2024-06-14 RX ADMIN — ESTRADIOL 2 APPLICATOR: 0.1 CREAM VAGINAL at 08:40

## 2024-06-14 RX ADMIN — METHENAMINE HIPPURATE 1 G: 1000 TABLET ORAL at 21:19

## 2024-06-14 RX ADMIN — DULOXETINE HYDROCHLORIDE 60 MG: 30 CAPSULE, DELAYED RELEASE ORAL at 08:38

## 2024-06-14 RX ADMIN — CARVEDILOL 25 MG: 25 TABLET, FILM COATED ORAL at 18:21

## 2024-06-14 RX ADMIN — Medication 1 TABLET: at 08:38

## 2024-06-14 RX ADMIN — ISOSORBIDE MONONITRATE 60 MG: 60 TABLET, EXTENDED RELEASE ORAL at 08:39

## 2024-06-14 RX ADMIN — OXYCODONE HYDROCHLORIDE AND ACETAMINOPHEN 500 MG: 500 TABLET ORAL at 08:39

## 2024-06-14 RX ADMIN — PANTOPRAZOLE SODIUM 40 MG: 40 TABLET, DELAYED RELEASE ORAL at 08:38

## 2024-06-14 RX ADMIN — TRAMADOL HYDROCHLORIDE 50 MG: 50 TABLET ORAL at 21:45

## 2024-06-14 RX ADMIN — LEVOTHYROXINE SODIUM 75 MCG: 0.07 TABLET ORAL at 06:17

## 2024-06-14 RX ADMIN — APIXABAN 5 MG: 5 TABLET, FILM COATED ORAL at 21:19

## 2024-06-14 RX ADMIN — FOLIC ACID 1000 MCG: 1 TABLET ORAL at 08:39

## 2024-06-14 RX ADMIN — CEFTRIAXONE SODIUM 2000 MG: 2 INJECTION, POWDER, FOR SOLUTION INTRAMUSCULAR; INTRAVENOUS at 11:55

## 2024-06-14 RX ADMIN — APIXABAN 5 MG: 5 TABLET, FILM COATED ORAL at 08:39

## 2024-06-14 RX ADMIN — LEFLUNOMIDE 20 MG: 20 TABLET ORAL at 08:38

## 2024-06-14 RX ADMIN — SPIRONOLACTONE 25 MG: 25 TABLET ORAL at 08:38

## 2024-06-14 RX ADMIN — Medication 10 ML: at 21:19

## 2024-06-14 NOTE — PROGRESS NOTES
[facility]  INPATIENT PROGRESS NOTE    Patient Name: Tawny Shin  Date of Admission: 6/12/2024  Today's Date: 06/14/24  Length of Stay: 0  Primary Care Physician: Moises Oliveira MD    Subjective   Chief Complaint: Altered mental status  HPI   Alert and oriented no distress.  GI consult input noted recommending SLP evaluation for swallowing exercises.  Urine cultures pending possibly discharge tomorrow.    6/13 Patient alert and oriented x 3 today.  No distress or ill-appearing.  She had complained of having difficulty with swallowing big pills over the last week and reduced oral intake due to this.  She had an esophageal dilatation 2 weeks ago which was incomplete.    Review of Systems   All pertinent negatives and positives are as above. All other systems have been reviewed and are negative unless otherwise stated.     Objective    Temp:  [97.7 °F (36.5 °C)-98.6 °F (37 °C)] 97.9 °F (36.6 °C)  Heart Rate:  [66-74] 74  Resp:  [16-18] 18  BP: ()/(45-61) 117/45  Physical Exam  Vitals reviewed.   Constitutional:       General: She is not in acute distress.     Appearance: She is well-developed. She is not toxic-appearing.   HENT:      Head: Normocephalic and atraumatic.      Right Ear: External ear normal.      Left Ear: External ear normal.      Mouth/Throat:      Mouth: Mucous membranes are dry.      Pharynx: Oropharynx is clear.   Eyes:      General:         Right eye: No discharge.         Left eye: No discharge.      Extraocular Movements: Extraocular movements intact.      Conjunctiva/sclera: Conjunctivae normal.      Pupils: Pupils are equal, round, and reactive to light.   Neck:      Vascular: No JVD.   Cardiovascular:      Rate and Rhythm: Normal rate and regular rhythm.      Pulses: Normal pulses.      Heart sounds: Normal heart sounds. No murmur heard.     No friction rub. No gallop.   Pulmonary:      Effort: Pulmonary effort is normal. No  "respiratory distress.      Breath sounds: No stridor. No wheezing, rhonchi or rales.   Chest:      Chest wall: No tenderness.   Abdominal:      General: Bowel sounds are normal. There is no distension.      Palpations: Abdomen is soft.      Tenderness: There is no abdominal tenderness. There is no guarding or rebound.      Hernia: No hernia is present.   Musculoskeletal:         General: No swelling, tenderness or deformity. Normal range of motion.      Cervical back: Normal range of motion and neck supple. No rigidity or tenderness. No muscular tenderness.      Right lower leg: No edema.      Left lower leg: No edema.   Skin:     General: Skin is warm and dry.      Findings: No erythema or rash.   Neurological:      General: No focal deficit present.      Mental Status: She is alert and oriented to person, place, and time.      Cranial Nerves: No cranial nerve deficit.      Sensory: No sensory deficit.      Motor: No weakness or abnormal muscle tone.      Deep Tendon Reflexes: Reflexes normal.   Psychiatric:         Mood and Affect: Mood normal.         Behavior: Behavior normal.     Results Review:  I have reviewed the labs, radiology results, and diagnostic studies.    Laboratory Data:   Results from last 7 days   Lab Units 06/12/24  2112   WBC 10*3/mm3 8.62   HEMOGLOBIN g/dL 8.9*   HEMATOCRIT % 29.3*   PLATELETS 10*3/mm3 164        Results from last 7 days   Lab Units 06/13/24  0441 06/12/24  2112   SODIUM mmol/L 138 138   POTASSIUM mmol/L 4.0 3.7   CHLORIDE mmol/L 102 100   CO2 mmol/L 25.0 26.0   BUN mg/dL 18 19   CREATININE mg/dL 1.09* 1.13*   CALCIUM mg/dL 8.1* 8.5*   BILIRUBIN mg/dL  --  0.5   ALK PHOS U/L  --  77   ALT (SGPT) U/L  --  23   AST (SGOT) U/L  --  27   GLUCOSE mg/dL 155* 132*       Culture Data:   No results found for: \"BLOODCX\", \"URINECX\", \"WOUNDCX\", \"MRSACX\", \"RESPCX\", \"STOOLCX\"    Radiology Data:   Imaging Results (Last 24 Hours)       ** No results found for the last 24 hours. **      "       I have reviewed the patient's current medications.     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  Will be concerned with progression of dysphagia leading again to dehydration and poor oral intake.    Will stop Solu-Medrol    Medications reviewed    DVT prophylaxis patient anticoagulated    Medical Decision Making  Number and Complexity of problems: 4 complex medical problems  Differential Diagnosis: See above    Conditions and Status        Condition is improving.     MDM Data  External documents reviewed: [facility] EHR  Cardiac tracing (EKG, telemetry) interpretation: -  Radiology interpretation: -  Labs reviewed: -  Any tests that were considered but not ordered: -     Decision rules/scores evaluated (example [id number], Wells, etc): -     Discussed with: Patient     Care Planning  Shared decision making: Patient  Code status and discussions: DNR/DNI    Disposition  Social Determinants of Health that impact treatment or disposition: none  I expect the patient to be discharged to home in 1-2 days.     Electronically signed by Kris Cade MD, 06/14/24, 15:43 CDT.

## 2024-06-14 NOTE — PLAN OF CARE
Goal Outcome Evaluation:      Pt Aox4. VSS on RA. 1L NC used prn by patient. Turned Q2 to prevent skin breakdown. Wound care provided per orders. Preventive foam bandages placed on heels. Caregiver at bedside. Bath and shampoo cap performed today. Pt placed on dolphin mattress. Denies pain. Abx infused per orders. Voiding via external catheter. GI consult performed today. ST consult ordered, to be carried out. Safety maintained.

## 2024-06-14 NOTE — PLAN OF CARE
Goal Outcome Evaluation:  Plan of Care Reviewed With: patient, caregiver, other (see comments) (Sitter)        Progress: improving  Outcome Evaluation: Patient is alert and oriented. VSS. Patient is on RA, No complaints of pain this shift, rested well this shift. Family/Caregiver at bedside. Wound care for gluteal wound, turned q2hr. Safety precautions maintained and call light within reach.

## 2024-06-14 NOTE — CONSULTS
"                                Inpatient Gastroenterology Consult  Referring Provider: Kris Cade,*  Gastroenterologist of Record: None  Reason for Consultation: Dysphagia    Subjective     History of present illness: Patient reports intermittent \"dysphagia\".  She describes this as gagging when she tries to swallow pills down, especially the larger pills.  It does not happen when she consumes food or liquid.  She voices complaints last time she was in the hospital roughly 2 to 3 weeks ago.  She underwent EGD by Dr. Taiwo Sanchez.  This examination demonstrated mild stricture in the distal esophagus which was aggressively dilated.  Patient has had recurrent symptoms of her \"dysphagia\".  Patient has advanced rheumatoid arthritis.  She has Sjogren's syndrome.  She has laryngitis sicca.    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    Elevated cholesterol     Eustachian tube dysfunction     Heart disease     Herpes simplex     History of transfusion     Hyperlipidemia     Hypertension     Hypothyroidism 12/23/2020    Intrinsic asthma 12/23/2020    Knee dislocation     Labral tear of right hip joint     Laryngitis sicca     Laryngitis, chronic     Left carotid bruit 03/09/2016    MI (myocardial infarction)     Myalgia due to statin 06/25/2019    Open wound of right hip 09/14/2021    Osteomyelitis of right femur 07/06/2021    Otorrhea     Pacemaker 11/17/2016    Primary " "osteoarthritis of left knee 12/23/2020    Psoriasis vulgaris 12/23/2020    S/P coronary artery stent placement 03/09/2016    Sensorineural hearing loss     Seropositive rheumatoid arthritis of multiple sites 12/27/2019    Overview:  -myochrysine '93-'96 -methotrexate '96--->11/98;r/s  restarted 2/99--> 8/14 (anemia) -sulfasalazine- not effective -penicillamine 6/98-->10/98; no effect -leflunomide 11/98--> - Humira '13-->didn't take - Enbrel 12/14-->3/15- no effect!   Last Assessment & Plan:  - \"aching all over\" because she had to be off her anti-rheumatic drugs for 2 weeks in preparation for her R knee surgery - he    Sick sinus syndrome 12/27/2019    Sjogren's disease     Spondylolisthesis of lumbar region 01/17/2018    Syncope, recurrent 02/08/2021    Urinary tract infection     UTI (urinary tract infection) 04/19/2024       Past Surgical History:  Past Surgical History:   Procedure Laterality Date    A-V CARDIAC PACEMAKER INSERTION  2016    ATRIAL CARDIAC PACEMAKER INSERTION      CARDIAC CATHETERIZATION      CATARACT EXTRACTION      CERVICAL CORPECTOMY N/A 3/3/2021    Procedure: CERVICAL 6 CORPECTOMY WITH TITANIUM CAGE WITH NEURO MONITORING;  Surgeon: Bandar Shea MD;  Location: [facility] OR;  Service: Neurosurgery;  Laterality: N/A;    COLONOSCOPY  11/08/2011    One fold in the ascending colon which showed ulcer otherwise normal exam    COLONOSCOPY  11/12/2004    Normal exam repeat in five years    CORONARY ANGIOPLASTY WITH STENT PLACEMENT      X 2; 2013 & 2014    ENDOSCOPY  07/10/2014    Normal exam    ENDOSCOPY N/A 5/30/2024    Procedure: ESOPHAGOGASTRODUODENOSCOPY WITH ANESTHESIA;  Surgeon: Taiwo Sanchez MD;  Location: [facility] ENDOSCOPY;  Service: Gastroenterology;  Laterality: N/A;  preop; dysphagia  postop: balloon dilation  PCP Jesus Manuel jeff    FLAP LEG Right 9/14/2021    Procedure: RIGHT GLUTEAL FASCIOCUTANEOUS ADVANCEMENT FLAP AND RIGHT TENSOR FASCIAL JESSICA FLAP;  Surgeon: Amadeo Turner MD;  " Location:  [facility] OR;  Service: Plastics;  Laterality: Right;    HIP ABDUCTION TENOTOMY BILATERAL Right 1/14/2021    Procedure: RIGHT HIP GLUTEUS MEDLUS / MINIMUS REPAIR, POSSIBLE ACHILLES ALLOGRAFT;  Surgeon: Nino Carlson MD;  Location:  [facility] OR;  Service: Orthopedics;  Laterality: Right;    INCISION AND DRAINAGE ABSCESS Right 6/4/2022    Procedure: INCISION AND DRAINAGE ABSCESS right hip;  Surgeon: Magda Salcido MD;  Location:  [facility] OR;  Service: General;  Laterality: Right;    INCISION AND DRAINAGE ABSCESS Right 6/10/2022    Procedure: RIGHT HIP INCISION AND DRAINAGE. MD NEEDS 3L VANC IRRIGATION, CURRETTES, DAICANS, KERLEX ROLLS;  Surgeon: Amadeo Turner MD;  Location:  [facility] OR;  Service: Plastics;  Laterality: Right;    INCISION AND DRAINAGE HIP Right 2/9/2021    Procedure: HIP INCISION AND DRAINAGE;  Surgeon: Nino Carlson MD;  Location:  [facility] OR;  Service: Orthopedics;  Laterality: Right;    INCISION AND DRAINAGE LEG Right 10/24/2021    Procedure: INCISION AND DRAINAGE LOWER EXTREMITY;  Surgeon: Amadeo Turner MD;  Location:  [facility] OR;  Service: Plastics;  Laterality: Right;    INCISION AND DRAINAGE OF WOUND Right 7/8/2021    Procedure: INCISION AND DRAINAGE WOUND RIGHT HIP;  Surgeon: James Huntley MD;  Location:  [facility] OR;  Service: Orthopedics;  Laterality: Right;    JOINT REPLACEMENT      KYPHOPLASTY WITH BIOPSY Bilateral 10/26/2021    Procedure: THOARCIC 12 KYPHOPLASTY WITH BIOPSY;  Surgeon: Bandar Shea MD;  Location:  [facility] OR;  Service: Neurosurgery;  Laterality: Bilateral;    LEG DEBRIDEMENT Right 9/14/2021    Procedure: DEBRIDEMENT OF RIGHT HIP WOUND, RIGHT GLUTEAL FASCIOCUTANEOUS ADVANCEMENT FLAP AND RIGHT TENSOR FASCIAL JESSICA FLAP;  Surgeon: Amadeo Turner MD;  Location:  [facility] OR;  Service: Plastics;  Laterality: Right;    LUMBAR DISCECTOMY Right 3/23/2021    Procedure: LUMBAR DISCECTOMY MICRO, Lumbar 1/2 right;  Surgeon: Bandar Shea MD;  Location:   [facility] OR;  Service: Neurosurgery;  Laterality: Right;    LUMBAR FUSION N/A 1/19/2018    Procedure: L3-4,L4-5 DECOMPRESSION, POSTERIOR SPINAL FUSION WITH INSTRUMENTATION;  Surgeon: Fortino Oropeza MD;  Location:  [facility] OR;  Service:     LUMBAR LAMINECTOMY WITH FUSION Left 1/17/2018    Procedure: LEFT L3-4 L4-5 LATERAL LUMBAR INTERBODY FUSION;  Surgeon: Fortino Oropeza MD;  Location:  [facility] OR;  Service:     MASS EXCISION Right 4/23/2024    Procedure: RIGHT BUTTOCK MASS EXCISION;  Surgeon: Moises Keyes MD;  Location:  [facility] OR;  Service: General;  Laterality: Right;    MYRINGOTOMY W/ TUBES  09/04/2014    TUBES NO LONGER IN PLACE    OTHER SURGICAL HISTORY      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 so hardware was removed and spacers were placed    REPLACEMENT TOTAL KNEE Right         Social History:   Social History     Tobacco Use    Smoking status: Never     Passive exposure: Never    Smokeless tobacco: Never   Substance Use Topics    Alcohol use: No        Family History:  Family History   Problem Relation Age of Onset    Cancer Mother         Lung cancer    Heart disease Father         Doied of heart. Attack       Home Meds:  Medications Prior to Admission   Medication Sig Dispense Refill Last Dose    acetaminophen (TYLENOL) 325 MG tablet Take 2 tablets by mouth Every 6 (Six) Hours As Needed for Mild Pain (mild pain). Indications: Fever, Pain   Past Week    apixaban (Eliquis) 5 MG tablet tablet Take 1 tablet by mouth 2 (Two) Times a Day.   Past Week    Ascorbic Acid (Vitamin C) 500 MG capsule Take 1 tablet by mouth Daily. Indications: Inadequate Vitamin C   Past Week    aspirin 81 MG EC tablet Take 1 tablet by mouth Daily. Indications: Buildup of Plaques in Large Arteries Leading to the Brain   Past Week    carvedilol (COREG) 25 MG tablet Take 1 tablet by mouth 2 (Two) Times a Day With Meals. Indications: High Blood Pressure Disorder   Past Week    Diclofenac Sodium (VOLTAREN) 1 % gel gel Apply 4 g topically to  the appropriate area as directed 4 (Four) Times a Day As Needed (Mild pain). Indications: Joint Damage causing Pain and Loss of Function   Past Week    DULoxetine (CYMBALTA) 60 MG capsule Take 1 capsule by mouth Daily. Indications: Musculoskeletal Pain   Past Week    estradiol (ESTRACE) 0.1 MG/GM vaginal cream Insert 2 g into the vagina Daily. 42.5 g 0 Past Week    ferrous sulfate 324 (65 Fe) MG tablet delayed-release EC tablet Take 1 tablet by mouth Daily With Breakfast. Indications: Iron Deficiency   Past Week    folic acid (FOLVITE) 1 MG tablet Take 1 tablet by mouth Daily. Indications: Anemia From Inadequate Folic Acid   Past Week    furosemide (LASIX) 40 MG tablet Take 1 tablet by mouth Daily. Indications: Cardiac Failure, Edema   Past Week    isosorbide mononitrate (IMDUR) 60 MG 24 hr tablet Take 1 tablet by mouth Daily. Indications: Stable Angina Pectoris   Past Week    leflunomide (ARAVA) 20 MG tablet Take 1 tablet by mouth every night at bedtime. Indications: Rheumatoid Arthritis 90 tablet 3 Past Week    levothyroxine (SYNTHROID, LEVOTHROID) 75 MCG tablet Take 1 tablet by mouth Daily. Indications: Underactive Thyroid   Past Week    lidocaine (Lidoderm) 5 % Place 1 patch on the skin as directed by provider Daily As Needed for Mild Pain. Remove & Discard patch within 12 hours or as directed by MD 30 each 2 Past Week    losartan (COZAAR) 50 MG tablet Take 1 tablet by mouth Daily. Indications: High Blood Pressure Disorder   Past Week    methenamine (HIPREX) 1 g tablet Take 1 tablet by mouth 2 (Two) Times a Day With Meals.   Past Week    multivitamin with minerals tablet tablet Take 1 tablet by mouth Daily.   Past Week    pantoprazole (Protonix) 40 MG EC tablet Take 1 tablet by mouth Daily for 90 days. 90 tablet 0 Past Week    predniSONE (DELTASONE) 5 MG tablet Take 1 tablet by mouth Daily. Indications: Acute Joint Inflammation in Gout   Past Week    spironolactone (ALDACTONE) 25 MG tablet Take 1 tablet by mouth  Daily. 30 tablet 11 Past Week    traMADol (ULTRAM) 50 MG tablet Take 1 tablet by mouth Every 12 (Twelve) Hours As Needed for Moderate Pain. Indications: Pain   Past Week    valACYclovir (VALTREX) 500 MG tablet Take 1 tablet by mouth Daily As Needed. Indications: Shingles   Past Week    nitroglycerin (NITROSTAT) 0.4 MG SL tablet Place 1 tablet under the tongue Every 5 (Five) Minutes As Needed for Chest Pain. Take no more than 3 doses in 15 minutes.  Indications: Acute Angina Pectoris   Unknown       Current Meds:   apixaban, 5 mg, Oral, BID  ascorbic acid, 500 mg, Oral, Daily  aspirin, 81 mg, Oral, Daily  carvedilol, 25 mg, Oral, BID With Meals  cefTRIAXone, 2,000 mg, Intravenous, Q24H  DULoxetine, 60 mg, Oral, Daily  estradiol, 2 g, Vaginal, Daily  ferrous sulfate, 325 mg, Oral, Daily With Breakfast  folic acid, 1,000 mcg, Oral, Daily  furosemide, 40 mg, Oral, Daily  isosorbide mononitrate, 60 mg, Oral, Daily  leflunomide, 20 mg, Oral, Daily  levothyroxine, 75 mcg, Oral, Q AM  losartan, 50 mg, Oral, Daily  methenamine, 1 g, Oral, BID  methylPREDNISolone sodium succinate, 20 mg, Intravenous, Q24H  multivitamin with minerals, 1 tablet, Oral, Daily  pantoprazole, 40 mg, Oral, Daily  sodium chloride, 10 mL, Intravenous, Q12H  spironolactone, 25 mg, Oral, Daily        Allergies:  Allergies   Allergen Reactions    Atorvastatin Other (See Comments)     LEG CRAMPS      Amoxicillin Rash    Escitalopram Rash    Nabumetone Rash    Niacin Er Rash    Penicillin G Rash    Penicillins Rash    Simvastatin Rash       Review of Systems  Pertinent items are noted in HPI, all other systems reviewed and negative    Objective     Vital Signs  Temp:  [97.7 °F (36.5 °C)-98.6 °F (37 °C)] 97.9 °F (36.6 °C)  Heart Rate:  [66-74] 74  Resp:  [16-18] 18  BP: ()/(45-61) 117/45    Physical Exam:     General Appearance:  Alert, cooperative, in no acute distress   Head:  Normocephalic, without obvious abnormality, atraumatic   Eyes:  Lids and  "lashes normal, conjunctivae and sclerae normal, no icterus, no pallor, corneas clear, PERRLA   Ears:  Ears appear intact with no abnormalities noted   Throat:  No oral lesions, no thrush, oral mucosa moist   Neck:  No adenopathy, supple, trachea midline, no thyromegaly, no carotid bruit, no JVD   Back:  No kyphosis present, no scoliosis present, no skin lesions, erythema or scars, no tenderness to percussion, or palpation, range of motion normal   Lungs:  Clear to auscultation,respirations regular, even and unlabored    Heart:  Regular rhythm and normal rate, normal S1 and S2, no murmur, no gallop, no rub, no click   Breast Exam:  Deferred   Abdomen:  Normal bowel sounds, no masses, no organomegaly, soft non-tender, non-distended, no guarding, no rebound tenderness   Genitalia:  Deferred   Extremities:  Chronic stasis changes lower extremities   Pulses:  Pulses palpable and equal bilaterally   Skin:  Scattered ecchymoses on forearms   Lymph nodes:  No palpable adenopathy   Neurologic:  Cranial nerves 2 - 12 grossly intact, sensation intact, DTR present and equal bilaterally       WBC No results found for: \"WBCS\"   HGB Hemoglobin   Date Value Ref Range Status   06/12/2024 8.9 (L) 12.0 - 15.9 g/dL Final      HCT Hematocrit   Date Value Ref Range Status   06/12/2024 29.3 (L) 34.0 - 46.6 % Final      Platelets No results found for: \"LABPLAT\"   MCV MCV   Date Value Ref Range Status   06/12/2024 94.5 79.0 - 97.0 fL Final          Sodium Sodium   Date Value Ref Range Status   06/13/2024 138 136 - 145 mmol/L Final   06/12/2024 138 136 - 145 mmol/L Final      Potassium Potassium   Date Value Ref Range Status   06/13/2024 4.0 3.5 - 5.2 mmol/L Final   06/12/2024 3.7 3.5 - 5.2 mmol/L Final      Chloride Chloride   Date Value Ref Range Status   06/13/2024 102 98 - 107 mmol/L Final   06/12/2024 100 98 - 107 mmol/L Final      CO2 CO2   Date Value Ref Range Status   06/13/2024 25.0 22.0 - 29.0 mmol/L Final   06/12/2024 26.0 22.0 - " "29.0 mmol/L Final      BUN BUN   Date Value Ref Range Status   06/13/2024 18 8 - 23 mg/dL Final   06/12/2024 19 8 - 23 mg/dL Final      Creatinine Creatinine   Date Value Ref Range Status   06/13/2024 1.09 (H) 0.57 - 1.00 mg/dL Final   06/12/2024 1.13 (H) 0.57 - 1.00 mg/dL Final      Calcium Calcium   Date Value Ref Range Status   06/13/2024 8.1 (L) 8.6 - 10.5 mg/dL Final   06/12/2024 8.5 (L) 8.6 - 10.5 mg/dL Final      Albumin Albumin   Date Value Ref Range Status   06/12/2024 3.3 (L) 3.5 - 5.2 g/dL Final      AST  ALT  PT/INR:   AST (SGOT)   Date Value Ref Range Status   06/12/2024 27 1 - 32 U/L Final     ALT (SGPT)   Date Value Ref Range Status   06/12/2024 23 1 - 33 U/L Final     No results found for: \"PROTIME\"/No results found for: \"INR\"         Imaging Results (Last 72 Hours)       Procedure Component Value Units Date/Time    CT Head Without Contrast [[id number]] Collected: 06/13/24 0618     Updated: 06/13/24 0628    Narrative:      EXAMINATION: CT HEAD WO CONTRAST-      6/12/2024 8:59 PM     HISTORY: altered mentation     In order to have a CT radiation dose as low as reasonably achievable  Automated Exposure Control was utilized for adjustment of the mA and/or  KV according to patient size.     Total DLP = 736.68 mGy.cm     The CT scan of the head is performed without intravenous contrast  enhancement.     Images are acquired in axial plane and subsequent reconstruction in  coronal and sagittal planes.     The comparison is made with the previous study dated 8/22/2023.     There is no evidence of a mass. There is no midline shift.     There is no evidence of intracranial hemorrhage or hematoma.     Moderately dilated ventricles, basal cisterns and the cortical sulci are  similar to the previous study representing chronic volume loss.     Scattered areas of chronic white matter ischemia bilaterally are similar  to the previous study. The gray-white matter differentiation is  maintained.     A small empty sella " turcica is seen.     Posterior fossa structures are unremarkable.     Images reviewed in bone window show no displaced acute skull fracture.  There is mucosal thickening involving the ethmoid maxillary and sphenoid  sinuses with left-sided mastoid effusion. Frontal sinuses are poorly  developed.       Impression:      1. No acute intracranial abnormality.  2. Chronic sinusitis and left mastoid effusion.     The above study was initially reviewed and reported by [facility]. I do not  find any discrepancies.                                      This report was signed and finalized on 6/13/2024 6:25 AM by Dr. Jose Patton MD.       XR Chest 1 View [[id number]] Collected: 06/12/24 2211     Updated: 06/12/24 2214    Narrative:      XR CHEST 1 VW- 6/12/2024 8:35 PM     HISTORY: fever       COMPARISON: Chest x-ray dated 5/24/2024     FINDINGS:  Upright frontal radiograph of the chest was obtained     No lung consolidation. No pleural effusion or pneumothorax. The  cardiomediastinal silhouette and pulmonary vascularity are within normal  limits. The osseous structures and surrounding soft tissues demonstrate  no acute abnormality. Left chest wall dual-chamber pacemaker.       Impression:      1.  Stable chest exam without acute process. No visualized infiltrate.     This report was signed and finalized on 6/12/2024 10:11 PM by Dr Riki David.               Result Review:  I have personally reviewed the results from the time of this admission to 6/14/2024 14:28 CDT and agree with these findings:  [x]  Laboratory list / accordion  []  Microbiology  [x]  Radiology  []  EKG/Telemetry   []  Cardiology/Vascular   []  Pathology  [x]  Old records  []  Other:  Most notable findings include: I have personally reviewed the Endo photos from Dr. Taiwo Sanchez's recent examination.  I do not offer an alternate interpretation.      Assessment & Plan       Altered mental status    UTI (urinary tract infection)      Patient is describing  oropharyngeal dysphagia, related to pills and at this time little else.  I would get a speech pathology video swallow study.  I do not recommend repeat endoscopy.  I do not recommend single column barium swallow at this time.  We discussed simple ways to deal with this issue such as putting her pills in yogurt or pudding which would make them much easier to swallow.    I discussed the patients findings and my recommendations with patient and family    Joshua Brito MD  06/14/24  14:28 CDT    DISCLAIMER:    This physician works through a [employer] as an inpatient consultant gastroenterologist only and has no outpatient clinic for patient follow up.  Any results not available at time of inpatient discharge and/or GI clinic follow up should be managed by the hospitalist team, PCP, or outpatient gastroenterologist.

## 2024-06-14 NOTE — CASE COMMUNICATION
Patient missed a PT visit from [facility] on 6/14/24.     Reason: Patient currently in the hospital.       For your records only.   Per CMS Guidance, MD must be notified of missed/cancelled visits; therefore the prescribed frequency was not met.

## 2024-06-14 NOTE — CASE MANAGEMENT/SOCIAL WORK
Discharge Planning Assessment   [facility]     Patient Name: Tawny Shin  MRN: [id number]  Today's Date: 6/14/2024    Admit Date: 6/12/2024        Discharge Needs Assessment       Row Name 06/14/24 1003       Living Environment    People in Home alone;other (see comments)  Pt has 24 hour caregiver at this time    Current Living Arrangements home    Potentially Unsafe Housing Conditions none    Primary Care Provided by other (see comments)  24 hour caregiver    Provides Primary Care For no one    Family Caregiver if Needed child(mary jo), adult;sibling(s)    Quality of Family Relationships helpful;involved;supportive    Able to Return to Prior Arrangements yes       Transition Planning    Patient/Family Anticipates Transition to home with help/services    Patient/Family Anticipated Services at Transition home health care    Transportation Anticipated family or friend will provide       Discharge Needs Assessment    Readmission Within the Last 30 Days no previous admission in last 30 days    Current Outpatient/Agency/Support Group homecare agency    Equipment Currently Used at Home hospital bed;grab bar;power chair,(recliner lift);shower chair;wheelchair, motorized;walker, rolling    Concerns to be Addressed discharge planning;care coordination/care conferences    Equipment Needed After Discharge none    Discharge Facility/Level of Care Needs home with home health    Discharge Coordination/Progress Spoke with patient to complete DC planning. Pt plans to return home at DC. She has assistance at home from a 24/7 caregiver. Has a PCP and Rx coverage. Patient is current with [facility]  and plans to continue services w/ them at DC. Will follow for DC needs.                   Discharge Plan    No documentation.                 Continued Care and Services - Admitted Since 6/12/2024    No active coordination exists for this encounter.       Selected Continued Care - Prior Encounters Includes continued care and service  providers with selected services from prior encounters from 3/14/2024 to 6/14/2024      Discharged on 5/31/2024 Admission date: 5/24/2024 - Discharge disposition: Home-Health Care Svc      Home Medical Care       Service Provider Selected Services Address Phone Fax Patient Preferred     [facility] ,  [facility] [address] [facility], [facility] [id number] [phone] --                             Demographic Summary    No documentation.                  Functional Status    No documentation.                  Psychosocial    No documentation.                  Abuse/Neglect    No documentation.                  Legal    No documentation.                  Substance Abuse    No documentation.                  Patient Forms    No documentation.                     Adrienne Beauchamp, RN

## 2024-06-14 NOTE — CASE COMMUNICATION
Patient missed a skilled nurse visit from [facility] on 6/13/24.     Reason: Patient went to ER.       For your records only.   Per CMS Guidance, MD must be notified of missed/cancelled visits; therefore the prescribed frequency was not met.

## 2024-06-15 PROCEDURE — G0378 HOSPITAL OBSERVATION PER HR: HCPCS

## 2024-06-15 PROCEDURE — 25010000002 CEFTRIAXONE PER 250 MG: Performed by: FAMILY MEDICINE

## 2024-06-15 PROCEDURE — 92610 EVALUATE SWALLOWING FUNCTION: CPT | Performed by: SPEECH-LANGUAGE PATHOLOGIST

## 2024-06-15 RX ADMIN — ESTRADIOL 2 APPLICATOR: 0.1 CREAM VAGINAL at 10:02

## 2024-06-15 RX ADMIN — LOSARTAN POTASSIUM 50 MG: 50 TABLET, FILM COATED ORAL at 09:57

## 2024-06-15 RX ADMIN — ISOSORBIDE MONONITRATE 60 MG: 60 TABLET, EXTENDED RELEASE ORAL at 09:57

## 2024-06-15 RX ADMIN — POLYETHYLENE GLYCOL 3350 17 G: 17 POWDER, FOR SOLUTION ORAL at 15:53

## 2024-06-15 RX ADMIN — METHENAMINE HIPPURATE 1 G: 1000 TABLET ORAL at 21:22

## 2024-06-15 RX ADMIN — PANTOPRAZOLE SODIUM 40 MG: 40 TABLET, DELAYED RELEASE ORAL at 09:56

## 2024-06-15 RX ADMIN — FUROSEMIDE 40 MG: 40 TABLET ORAL at 09:58

## 2024-06-15 RX ADMIN — APIXABAN 5 MG: 5 TABLET, FILM COATED ORAL at 09:58

## 2024-06-15 RX ADMIN — Medication 10 ML: at 10:06

## 2024-06-15 RX ADMIN — ASPIRIN 81 MG: 81 TABLET, COATED ORAL at 09:56

## 2024-06-15 RX ADMIN — Medication 1 TABLET: at 09:57

## 2024-06-15 RX ADMIN — FERROUS SULFATE TAB 325 MG (65 MG ELEMENTAL FE) 325 MG: 325 (65 FE) TAB at 09:56

## 2024-06-15 RX ADMIN — OXYCODONE HYDROCHLORIDE AND ACETAMINOPHEN 500 MG: 500 TABLET ORAL at 09:58

## 2024-06-15 RX ADMIN — SPIRONOLACTONE 25 MG: 25 TABLET ORAL at 09:56

## 2024-06-15 RX ADMIN — CARVEDILOL 25 MG: 25 TABLET, FILM COATED ORAL at 09:57

## 2024-06-15 RX ADMIN — Medication 10 ML: at 21:22

## 2024-06-15 RX ADMIN — LEFLUNOMIDE 20 MG: 20 TABLET ORAL at 09:56

## 2024-06-15 RX ADMIN — METHENAMINE HIPPURATE 1 G: 1000 TABLET ORAL at 10:03

## 2024-06-15 RX ADMIN — FOLIC ACID 1000 MCG: 1 TABLET ORAL at 09:58

## 2024-06-15 RX ADMIN — SODIUM CHLORIDE 1000 MG: 900 INJECTION INTRAVENOUS at 11:22

## 2024-06-15 RX ADMIN — LEVOTHYROXINE SODIUM 75 MCG: 0.07 TABLET ORAL at 06:26

## 2024-06-15 RX ADMIN — DULOXETINE HYDROCHLORIDE 60 MG: 30 CAPSULE, DELAYED RELEASE ORAL at 09:57

## 2024-06-15 RX ADMIN — APIXABAN 5 MG: 5 TABLET, FILM COATED ORAL at 21:22

## 2024-06-15 NOTE — PLAN OF CARE
Goal Outcome Evaluation:  Plan of Care Reviewed With: patient, caregiver, other (see comments) (Sitter)        Progress: improving  Outcome Evaluation: Patient is AOx4. VSS, on RA, voiding via PW. No complaints of pain and resting well . Patient swallowed medicaton whole in applesauce easily. Caregiver present at the bedside. Safety maintained with call light within reach.

## 2024-06-15 NOTE — PLAN OF CARE
Goal Outcome Evaluation:  Plan of Care Reviewed With: patient, caregiver, family        Progress: no change (eval)  Outcome Evaluation: ST clinical bedside swallow evaluation completed. Pt admitted for acute AMS, esophogeal dialation 2wks ago, COPD, pacemaker, HTN, MI, and sjogrens disease.     Pt reports difficulty taking pills with thin liquids but has recently started taking with applesauce. Pt stated she does not have any difficulty and much improvement noted when taking meds with applesauce. Pt does request to only have 2 small pills or 1 larger pill at a time. No other complaints with swallowing per pt report. Pt repositioned and seated upright for PO trials. Puree, thin, and solids presented. Oral holding for a few seconds along with piecemeal deglutition noted with thin liquids. No overt s/s of aspiration observed.     Okay to remain on current regular solids diet and thin liquids. Meds whole in applesauce. General aspiration precautions. ST cannot fully r/o aspiration with PO. Discussed with pt possibility of completing modified barium swallow study in outpt setting if any increased difficulty with PO is noted. Pt agreed with POC at this time and stated she is satisfied with current diet and administration of meds in applesauce with no concerns noted. ST to sign off at this time. MD to reconsult if change or new concern.      Anticipated Discharge Disposition (SLP): home with assist          SLP Swallowing Diagnosis: swallow WFL/no suspected pharyngeal impairment (06/15/24 6487)

## 2024-06-15 NOTE — PROGRESS NOTES
[facility] Medicine Services  INPATIENT PROGRESS NOTE    Patient Name: Tawny Shin  Date of Admission: 6/12/2024  Today's Date: 06/15/24  Length of Stay: 0  Primary Care Physician: Moises Oliveira MD    Subjective   Chief Complaint: Altered mental status  HPI   Patient resting comfortably alert and oriented.  No cough or shortness of breath.  She wants to go home.  We are waiting for speech evaluation and the results of urine culture.  Called microbiology and urine culture has to be reset due to 2 bacteria present and this will be finalized by tomorrow.    6/15 Alert and oriented no distress.  GI consult input noted recommending SLP evaluation for swallowing exercises.  Urine cultures pending possibly discharge tomorrow.    6/13 Patient alert and oriented x 3 today.  No distress or ill-appearing.  She had complained of having difficulty with swallowing big pills over the last week and reduced oral intake due to this.  She had an esophageal dilatation 2 weeks ago which was incomplete.    Review of Systems   All pertinent negatives and positives are as above. All other systems have been reviewed and are negative unless otherwise stated.     Objective    Temp:  [97.4 °F (36.3 °C)-98.6 °F (37 °C)] 97.7 °F (36.5 °C)  Heart Rate:  [62-74] 67  Resp:  [18] 18  BP: (112-137)/(50-66) 123/50  Physical Exam  Vitals reviewed.   Constitutional:       General: She is not in acute distress.     Appearance: She is well-developed. She is not toxic-appearing.   HENT:      Head: Normocephalic and atraumatic.      Right Ear: External ear normal.      Left Ear: External ear normal.      Mouth/Throat:      Mouth: Mucous membranes are dry.      Pharynx: Oropharynx is clear.   Eyes:      General:         Right eye: No discharge.         Left eye: No discharge.      Extraocular Movements: Extraocular movements intact.      Conjunctiva/sclera: Conjunctivae normal.      Pupils: Pupils are equal,  round, and reactive to light.   Neck:      Vascular: No JVD.   Cardiovascular:      Rate and Rhythm: Normal rate and regular rhythm.      Pulses: Normal pulses.      Heart sounds: Normal heart sounds. No murmur heard.     No friction rub. No gallop.   Pulmonary:      Effort: Pulmonary effort is normal. No respiratory distress.      Breath sounds: No stridor. No wheezing, rhonchi or rales.   Chest:      Chest wall: No tenderness.   Abdominal:      General: Bowel sounds are normal. There is no distension.      Palpations: Abdomen is soft.      Tenderness: There is no abdominal tenderness. There is no guarding or rebound.      Hernia: No hernia is present.   Musculoskeletal:         General: No swelling, tenderness or deformity. Normal range of motion.      Cervical back: Normal range of motion and neck supple. No rigidity or tenderness. No muscular tenderness.      Right lower leg: No edema.      Left lower leg: No edema.   Skin:     General: Skin is warm and dry.      Findings: No erythema or rash.   Neurological:      General: No focal deficit present.      Mental Status: She is alert and oriented to person, place, and time.      Cranial Nerves: No cranial nerve deficit.      Sensory: No sensory deficit.      Motor: No weakness or abnormal muscle tone.      Deep Tendon Reflexes: Reflexes normal.   Psychiatric:         Mood and Affect: Mood normal.         Behavior: Behavior normal.     Results Review:  I have reviewed the labs, radiology results, and diagnostic studies.    Laboratory Data:   Results from last 7 days   Lab Units 06/12/24  2112   WBC 10*3/mm3 8.62   HEMOGLOBIN g/dL 8.9*   HEMATOCRIT % 29.3*   PLATELETS 10*3/mm3 164        Results from last 7 days   Lab Units 06/13/24  0441 06/12/24  2112   SODIUM mmol/L 138 138   POTASSIUM mmol/L 4.0 3.7   CHLORIDE mmol/L 102 100   CO2 mmol/L 25.0 26.0   BUN mg/dL 18 19   CREATININE mg/dL 1.09* 1.13*   CALCIUM mg/dL 8.1* 8.5*   BILIRUBIN mg/dL  --  0.5   ALK PHOS U/L   "--  77   ALT (SGPT) U/L  --  23   AST (SGOT) U/L  --  27   GLUCOSE mg/dL 155* 132*       Culture Data:   No results found for: \"BLOODCX\", \"URINECX\", \"WOUNDCX\", \"MRSACX\", \"RESPCX\", \"STOOLCX\"    Radiology Data:   Imaging Results (Last 24 Hours)       ** No results found for the last 24 hours. **            I have reviewed the patient's current medications.     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  Will be concerned with progression of dysphagia leading again to dehydration and poor oral intake.    Will stop Solu-Medrol    Medications reviewed    DVT prophylaxis patient anticoagulated    Probably discharge tomorrow after urine culture finalized.    Medical Decision Making  Number and Complexity of problems: 4 complex medical problems  Differential Diagnosis: See above    Conditions and Status        Condition is improving.     MDM Data  External documents reviewed: [facility] EHR  Cardiac tracing (EKG, telemetry) interpretation: -  Radiology interpretation: -  Labs reviewed: -  Any tests that were considered but not ordered: -     Decision rules/scores evaluated (example [id number], Wells, etc): -     Discussed with: Patient     Care Planning  Shared decision making: Patient  Code status and discussions: " DNR/DNI    Disposition  Social Determinants of Health that impact treatment or disposition: none  I expect the patient to be discharged to home in 1-2 days.     Electronically signed by Kris Cade MD, 06/15/24, 11:21 CDT.

## 2024-06-15 NOTE — PLAN OF CARE
Goal Outcome Evaluation:     Pt Aox4. VSS on RA. Turned Q2 to prevent skin breakdown. Wound care provided per orders. Preventive foam bandages placed on heels. Caregiver and family at bedside. Dolphin mattress in place. Denies pain. Abx infused per orders. Voiding via external catheter. ST consult performed today. Safety maintained.

## 2024-06-15 NOTE — THERAPY DISCHARGE NOTE
Acute Care - Speech Language Pathology   Swallow Initial Evaluation/Discharge [facility]     Patient Name: Tawny Shin  : 1955  MRN: [id number]  Today's Date: 6/15/2024               Admit Date: 2024  ST clinical bedside swallow evaluation completed. Pt admitted for acute AMS, esophogeal dialation 2wks ago, COPD, pacemaker, HTN, MI, and sjogrens disease.     Pt reports difficulty taking pills with thin liquids but has recently started taking with applesauce. Pt stated she does not have any difficulty and much improvement noted when taking meds with applesauce. Pt does request to only have 2 small pills or 1 larger pill at a time. No other complaints with swallowing per pt report. Pt repositioned and seated upright for PO trials. Puree, thin, and solids presented. Oral holding for a few seconds along with piecemeal deglutition noted with thin liquids. No overt s/s of aspiration observed.     Okay to remain on current regular solids diet and thin liquids. Meds whole in applesauce. General aspiration precautions. ST cannot fully r/o aspiration with PO. Discussed with pt possibility of completing modified barium swallow study in outpt setting if any increased difficulty with PO is noted. Pt agreed with POC at this time and stated she is satisfied with current diet and administration of meds in applesauce with no concerns noted. ST to sign off at this time. MD to reconsult if change or new concern.  Lauren Castellano, MS-CCC/SLP, CNT 6/15/2024 13:30 CDT    Visit Dx:    ICD-10-CM ICD-9-CM   1. Acute UTI (urinary tract infection)  N39.0 599.0   2. Altered mental status, unspecified altered mental status type  R41.82 780.97   3. Open wound of right buttock, sequela  S31.819S 906.0   4. Cyst of buttocks  L72.9 706.2   5. Dysphagia, unspecified type  R13.10 787.20     Patient Active Problem List   Diagnosis    T12 compression fracture, initial encounter    Chronic embolism and thrombosis of unspecified deep  veins of left lower extremity    Chronic anticoagulation    Iron deficiency anemia    Osteoporosis    E coli bacteremia    Epidural hematoma    Pleural effusion, left    Functional neurological symptom disorder with weakness or paralysis    Class 1 obesity due to excess calories with serious comorbidity and body mass index (BMI) of 33.0 to 33.9 in adult    Rheumatoid arthritis involving multiple sites    Chronic heart failure with preserved ejection fraction    Venous insufficiency    Coronary artery disease involving native coronary artery of native heart without angina pectoris    Sick sinus syndrome    Essential hypertension    Pressure injury of skin of heel    Chronic pain    Anemia of chronic disease    Cholelithiasis    Pressure injury of skin of buttock    Near functional paraplegia    Skin cancer    Squamous cell carcinoma of back    Hematoma    Right-sided chest wall pain    Hyperlipidemia LDL goal <70    Malodorous urine    Stage 3b chronic kidney disease    Cyst of buttocks    Sepsis due to Escherichia coli without acute organ dysfunction    Generalized weakness    Paralysis    History of urinary retention    Bacteremia due to Enterobacter species    Bacteremia    Hypothyroidism    Abnormal CT of the abdomen    Presence of cardiac pacemaker    Altered mental status    UTI (urinary tract infection)    Esophageal dysphagia     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    Elevated  "cholesterol     Eustachian tube dysfunction     Heart disease     Herpes simplex     History of transfusion     Hyperlipidemia     Hypertension     Hypothyroidism 12/23/2020    Intrinsic asthma 12/23/2020    Knee dislocation     Labral tear of right hip joint     Laryngitis sicca     Laryngitis, chronic     Left carotid bruit 03/09/2016    MI (myocardial infarction)     Myalgia due to statin 06/25/2019    Open wound of right hip 09/14/2021    Osteomyelitis of right femur 07/06/2021    Otorrhea     Pacemaker 11/17/2016    Primary osteoarthritis of left knee 12/23/2020    Psoriasis vulgaris 12/23/2020    S/P coronary artery stent placement 03/09/2016    Sensorineural hearing loss     Seropositive rheumatoid arthritis of multiple sites 12/27/2019    Overview:  -myochrysine '93-'96 -methotrexate '96--->11/98;r/s  restarted 2/99--> 8/14 (anemia) -sulfasalazine- not effective -penicillamine 6/98-->10/98; no effect -leflunomide 11/98--> - Humira '13-->didn't take - Enbrel 12/14-->3/15- no effect!   Last Assessment & Plan:  - \"aching all over\" because she had to be off her anti-rheumatic drugs for 2 weeks in preparation for her R knee surgery - he    Sick sinus syndrome 12/27/2019    Sjogren's disease     Spondylolisthesis of lumbar region 01/17/2018    Syncope, recurrent 02/08/2021    Urinary tract infection     UTI (urinary tract infection) 04/19/2024     Past Surgical History:   Procedure Laterality Date    A-V CARDIAC PACEMAKER INSERTION  2016    ATRIAL CARDIAC PACEMAKER INSERTION      CARDIAC CATHETERIZATION      CATARACT EXTRACTION      CERVICAL CORPECTOMY N/A 3/3/2021    Procedure: CERVICAL 6 CORPECTOMY WITH TITANIUM CAGE WITH NEURO MONITORING;  Surgeon: Bandar Shea MD;  Location: [facility];  Service: Neurosurgery;  Laterality: N/A;    COLONOSCOPY  11/08/2011    One fold in the ascending colon which showed ulcer otherwise normal exam    COLONOSCOPY  11/12/2004    Normal exam repeat in five years    " CORONARY ANGIOPLASTY WITH STENT PLACEMENT      X 2; 2013 & 2014    ENDOSCOPY  07/10/2014    Normal exam    ENDOSCOPY N/A 5/30/2024    Procedure: ESOPHAGOGASTRODUODENOSCOPY WITH ANESTHESIA;  Surgeon: Taiwo Sanchez MD;  Location: [facility] ENDOSCOPY;  Service: Gastroenterology;  Laterality: N/A;  preop; dysphagia  postop: balloon dilation  PCP Jesus Manuel jeff    FLAP LEG Right 9/14/2021    Procedure: RIGHT GLUTEAL FASCIOCUTANEOUS ADVANCEMENT FLAP AND RIGHT TENSOR FASCIAL JESSICA FLAP;  Surgeon: Amadeo Turner MD;  Location: [facility] OR;  Service: Plastics;  Laterality: Right;    HIP ABDUCTION TENOTOMY BILATERAL Right 1/14/2021    Procedure: RIGHT HIP GLUTEUS MEDLUS / MINIMUS REPAIR, POSSIBLE ACHILLES ALLOGRAFT;  Surgeon: Nino Carlson MD;  Location: [facility] OR;  Service: Orthopedics;  Laterality: Right;    INCISION AND DRAINAGE ABSCESS Right 6/4/2022    Procedure: INCISION AND DRAINAGE ABSCESS right hip;  Surgeon: Magda Salcido MD;  Location: [facility] OR;  Service: General;  Laterality: Right;    INCISION AND DRAINAGE ABSCESS Right 6/10/2022    Procedure: RIGHT HIP INCISION AND DRAINAGE. MD NEEDS 3L VANC IRRIGATION, CURRETTES, DAICANS, KERLEX ROLLS;  Surgeon: Amadeo Turner MD;  Location: [facility] OR;  Service: Plastics;  Laterality: Right;    INCISION AND DRAINAGE HIP Right 2/9/2021    Procedure: HIP INCISION AND DRAINAGE;  Surgeon: Nino Carlson MD;  Location: [facility] OR;  Service: Orthopedics;  Laterality: Right;    INCISION AND DRAINAGE LEG Right 10/24/2021    Procedure: INCISION AND DRAINAGE LOWER EXTREMITY;  Surgeon: Amadeo Turner MD;  Location: [facility] OR;  Service: Plastics;  Laterality: Right;    INCISION AND DRAINAGE OF WOUND Right 7/8/2021    Procedure: INCISION AND DRAINAGE WOUND RIGHT HIP;  Surgeon: James Huntley MD;  Location: [facility] OR;  Service: Orthopedics;  Laterality: Right;    JOINT REPLACEMENT      KYPHOPLASTY WITH BIOPSY Bilateral 10/26/2021    Procedure: THOARCIC 12 KYPHOPLASTY WITH  BIOPSY;  Surgeon: Bandar Shea MD;  Location:  [facility] OR;  Service: Neurosurgery;  Laterality: Bilateral;    LEG DEBRIDEMENT Right 9/14/2021    Procedure: DEBRIDEMENT OF RIGHT HIP WOUND, RIGHT GLUTEAL FASCIOCUTANEOUS ADVANCEMENT FLAP AND RIGHT TENSOR FASCIAL JESSICA FLAP;  Surgeon: Amadeo Turner MD;  Location:  [facility] OR;  Service: Plastics;  Laterality: Right;    LUMBAR DISCECTOMY Right 3/23/2021    Procedure: LUMBAR DISCECTOMY MICRO, Lumbar 1/2 right;  Surgeon: Bandar Shea MD;  Location:  [facility] OR;  Service: Neurosurgery;  Laterality: Right;    LUMBAR FUSION N/A 1/19/2018    Procedure: L3-4,L4-5 DECOMPRESSION, POSTERIOR SPINAL FUSION WITH INSTRUMENTATION;  Surgeon: Fortino Oropeza MD;  Location:  [facility] OR;  Service:     LUMBAR LAMINECTOMY WITH FUSION Left 1/17/2018    Procedure: LEFT L3-4 L4-5 LATERAL LUMBAR INTERBODY FUSION;  Surgeon: Fortino Oropeza MD;  Location:  [facility] OR;  Service:     MASS EXCISION Right 4/23/2024    Procedure: RIGHT BUTTOCK MASS EXCISION;  Surgeon: Moises Keyes MD;  Location:  [facility] OR;  Service: General;  Laterality: Right;    MYRINGOTOMY W/ TUBES  09/04/2014    TUBES NO LONGER IN PLACE    OTHER SURGICAL HISTORY      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 so hardware was removed and spacers were placed    REPLACEMENT TOTAL KNEE Right        SLP Recommendation and Plan  SLP Swallowing Diagnosis: swallow WFL/no suspected pharyngeal impairment (06/15/24 1133)  SLP Diet Recommendation: regular textures, thin liquids (06/15/24 1133)     Monitor for Signs of Aspiration: yes, notify SLP if any concerns (06/15/24 1133)  Recommended Diagnostics: VFSS (MBS), other (see comments) (if further concerns noted) (06/15/24 1133)  Swallow Criteria for Skilled Therapeutic Interventions Met: baseline status, no problems identified which require skilled intervention (06/15/24 1133)  Anticipated Discharge Disposition (SLP): home with assist (06/15/24 1328)     Therapy Frequency (Swallow):  evaluation only (06/15/24 1133)              Anticipated Discharge Disposition (SLP): home with assist (06/15/24 1328)           Reason for Discharge: other (see comments) (eval only) (06/15/24 1328)                Plan of Care Reviewed With: patient, caregiver, family (06/15/24 1327)  Progress: no change (eval) (06/15/24 1327)  Outcome Evaluation: ST clinical bedside swallow evaluation completed. Pt admitted for acute AMS, esophogeal dialation 2wks ago, COPD, pacemaker, HTN, MI, and sjogrens disease. Pt reports difficulty taking pills with thin liquids but has recently started taking with applesauce. Pt stated she does not have any difficulty and much improvement noted when taking meds with applesauce. Pt does request to only have 2 small pills or 1 larger pill at a time. No other complaints with swallowing per pt report. Pt repositioned and seated upright for PO trials. Puree, thin, and solids presented. Oral holding for a few seconds along with piecemeal deglutition noted with thin liquids. No overt s/s of aspiration observed. Okay to remain on current regular solids diet and thin liquids. Meds whole in applesauce. General aspiration precautions. ST cannot fully r/o aspiration with PO. Discussed with pt possibility of completing modified barium swallow study in outpt setting if any increased difficulty with PO is noted. Pt agreed with POC at this time and stated she is satisfied with current diet and administration of meds in applesauce with no concerns noted. ST to sign off at this time. MD to reconsult if change or new concern. (06/15/24 1327)    SWALLOW EVALUATION (Last 72 Hours)       SLP Adult Swallow Evaluation       Row Name 06/15/24 8759                   Rehab Evaluation    Document Type evaluation  -BN        Subjective Information no complaints  -BN        Patient Observations alert;cooperative  -BN        Patient/Family/Caregiver Comments/Observations family present  -BN        Patient Effort good   -BN           General Information    Patient Profile Reviewed yes  -BN        Pertinent History Of Current Problem acute AMS. Hx of esophogeal dialation 2 wks ago, COPD, pacemaker, HTN, MI, sjogrens disease.  -BN        Current Method of Nutrition regular textures;thin liquids  -BN        Precautions/Limitations, Vision WFL;for purposes of eval  -BN        Precautions/Limitations, Hearing WFL;for purposes of eval  -BN        Prior Level of Function-Communication WFL  -BN        Prior Level of Function-Swallowing no diet consistency restrictions  -BN        Plans/Goals Discussed with patient and family  -BN        Barriers to Rehab previous functional deficit  -BN        Patient's Goals for Discharge return home  -BN           Pain    Additional Documentation Pain Scale: Numbers Pre/Post-Treatment (Group)  -BN           Pain Scale: Numbers Pre/Post-Treatment    Pretreatment Pain Rating 0/10 - no pain  -BN        Posttreatment Pain Rating 0/10 - no pain  -BN           Oral Motor Structure and Function    Dentition Assessment natural, present and adequate  -BN        Secretion Management WNL/WFL  -BN        Mucosal Quality moist, healthy  -BN           Oral Musculature and Cranial Nerve Assessment    Oral Motor General Assessment WFL  -BN           General Eating/Swallowing Observations    Respiratory Support Currently in Use room air  -BN        Eating/Swallowing Skills fed by SLP;self-fed  -BN        Positioning During Eating upright in bed  -BN        Utensils Used spoon;straw  -BN        Consistencies Trialed regular textures;pudding thick;thin liquids  -BN           Clinical Swallow Eval    Oral Prep Phase impaired  -BN        Oral Transit WFL  -BN        Oral Residue WFL  -BN        Pharyngeal Phase WFL  -BN        Esophageal Phase suspected esophageal impairment  -BN        Clinical Swallow Evaluation Summary ST clinical bedside swallow evaluation completed. Pt admitted for acute AMS, esophogeal dialation 2wks  ago, COPD, pacemaker, HTN, MI, and sjogrens disease. Pt reports difficulty taking pills with thin liquids but has recently started taking with applesauce. Pt stated she does not have any difficulty and much improvement noted when taking meds with applesauce. Pt does request to only have 2 small pills or 1 larger pill at a time. No other complaints with swallowing per pt report. Pt repositioned and seated upright for PO trials. Puree, thin, and solids presented. Oral holding for a few seconds along with piecemeal deglutition noted with thin liquids. No overt s/s of aspiration observed. Okay to remain on current regular solids diet and thin liquids. Meds whole in applesauce. General aspiration precautions. ST cannot fully r/o aspiration with PO. Discussed with pt possibility of completing modified barium swallow study in outpt setting if any increased difficulty with PO is noted. Pt agreed with POC at this time and stated she is satisfied with current diet and administration of meds in applesauce with no concerns noted. ST to sign off at this time. MD to reconsult if change or new concern.  -BN           Oral Prep Concerns    Oral Prep Concerns oral holding  -BN        Oral Holding thin  -BN           Esophageal Phase Concerns    Esophageal Phase Concerns belching  -BN        Belching thin;regular consistencies  -BN           SLP Evaluation Clinical Impression    SLP Swallowing Diagnosis swallow WFL/no suspected pharyngeal impairment  -BN        Functional Impact no impact on function  -BN        Swallow Criteria for Skilled Therapeutic Interventions Met baseline status;no problems identified which require skilled intervention  -BN           SLP Treatment Clinical Impressions    Care Plan Review evaluation/treatment results reviewed;care plan/treatment goals reviewed;risks/benefits reviewed;current/potential barriers reviewed;patient/other agree to care plan  -BN        Care Plan Review, Other Participant(s)  caregiver;family  -BN           Recommendations    Therapy Frequency (Swallow) evaluation only  -BN        SLP Diet Recommendation regular textures;thin liquids  -BN        Recommended Diagnostics VFSS (MBS);other (see comments)  if further concerns noted  -BN        Recommended Precautions and Strategies upright posture during/after eating;small bites of food and sips of liquid;general aspiration precautions  -BN        Oral Care Recommendations Oral Care BID/PRN  -BN        SLP Rec. for Method of Medication Administration meds whole;with puree  -BN        Monitor for Signs of Aspiration yes;notify SLP if any concerns  -BN        Anticipated Discharge Disposition (SLP) home with assist  -BN                  User Key  (r) = Recorded By, (t) = Taken By, (c) = Cosigned By      Initials Name Effective Dates    Lauren Reese MS-CCC/SLP, MARI 07/11/23 -                     EDUCATION  The patient has been educated in the following areas:   Dysphagia (Swallowing Impairment).                   Time Calculation:    Time Calculation- SLP       Row Name 06/15/24 1328             Time Calculation- SLP    SLP Start Time 1133  -BN      SLP Stop Time 1236  -      SLP Time Calculation (min) 63 min  -BN      SLP Received On 06/15/24  -BN         Untimed Charges    SLP Eval/Re-eval  ST Eval Oral Pharyng Swallow - 13319  -BN      [id number] Eval Oral Pharyng Swallow Minutes 63  -BN         Total Minutes    Untimed Charges Total Minutes 63  -BN       Total Minutes 63  -BN                User Key  (r) = Recorded By, (t) = Taken By, (c) = Cosigned By      Initials Name Provider Type    Lauren Reese MS-CCC/SLP, MARI Speech and Language Pathologist                    Therapy Charges for Today       Code Description Service Date Service Provider Modifiers Qty    [id number] HC ST EVAL ORAL PHARYNG SWALLOW 4 6/15/2024 Lauren Castellano MS-CCC/SLP, MAIR GN 1                 SLP Discharge Summary  Anticipated  Discharge Disposition (SLP): home with assist  Reason for Discharge: other (see comments) (eval only)  Progress Toward Achieving Short/long Term Goals: other (see comments) (eval only)    Lauren Castellano MS-CCC/SLP, CNT  6/15/2024

## 2024-06-16 LAB
BACTERIA UR QL AUTO: ABNORMAL /HPF
BILIRUB UR QL STRIP: NEGATIVE
CLARITY UR: CLEAR
COLOR UR: YELLOW
GLUCOSE UR STRIP-MCNC: NEGATIVE MG/DL
HGB UR QL STRIP.AUTO: NEGATIVE
KETONES UR QL STRIP: NEGATIVE
LEUKOCYTE ESTERASE UR QL STRIP.AUTO: ABNORMAL
NITRITE UR QL STRIP: NEGATIVE
PH UR STRIP.AUTO: 6 [PH] (ref 5–8)
PROT UR QL STRIP: NEGATIVE
RBC # UR STRIP: ABNORMAL /HPF
REF LAB TEST METHOD: ABNORMAL
SP GR UR STRIP: 1.01 (ref 1–1.03)
SQUAMOUS #/AREA URNS HPF: ABNORMAL /HPF
UROBILINOGEN UR QL STRIP: ABNORMAL
WBC # UR STRIP: ABNORMAL /HPF
YEAST URNS QL MICRO: ABNORMAL /HPF

## 2024-06-16 PROCEDURE — 87077 CULTURE AEROBIC IDENTIFY: CPT | Performed by: FAMILY MEDICINE

## 2024-06-16 PROCEDURE — 25010000002 CEFTRIAXONE PER 250 MG: Performed by: FAMILY MEDICINE

## 2024-06-16 PROCEDURE — 87081 CULTURE SCREEN ONLY: CPT | Performed by: FAMILY MEDICINE

## 2024-06-16 PROCEDURE — 81001 URINALYSIS AUTO W/SCOPE: CPT | Performed by: FAMILY MEDICINE

## 2024-06-16 PROCEDURE — 87086 URINE CULTURE/COLONY COUNT: CPT | Performed by: FAMILY MEDICINE

## 2024-06-16 PROCEDURE — 25010000002 ONDANSETRON PER 1 MG: Performed by: INTERNAL MEDICINE

## 2024-06-16 PROCEDURE — 87186 SC STD MICRODIL/AGAR DIL: CPT | Performed by: FAMILY MEDICINE

## 2024-06-16 PROCEDURE — G0378 HOSPITAL OBSERVATION PER HR: HCPCS

## 2024-06-16 RX ADMIN — PANTOPRAZOLE SODIUM 40 MG: 40 TABLET, DELAYED RELEASE ORAL at 08:13

## 2024-06-16 RX ADMIN — LEVOTHYROXINE SODIUM 75 MCG: 0.07 TABLET ORAL at 06:38

## 2024-06-16 RX ADMIN — CARVEDILOL 25 MG: 25 TABLET, FILM COATED ORAL at 08:13

## 2024-06-16 RX ADMIN — Medication 10 ML: at 21:23

## 2024-06-16 RX ADMIN — METHENAMINE HIPPURATE 1 G: 1000 TABLET ORAL at 08:14

## 2024-06-16 RX ADMIN — APIXABAN 5 MG: 5 TABLET, FILM COATED ORAL at 08:14

## 2024-06-16 RX ADMIN — APIXABAN 5 MG: 5 TABLET, FILM COATED ORAL at 21:23

## 2024-06-16 RX ADMIN — DULOXETINE HYDROCHLORIDE 60 MG: 30 CAPSULE, DELAYED RELEASE ORAL at 08:14

## 2024-06-16 RX ADMIN — Medication 10 ML: at 08:16

## 2024-06-16 RX ADMIN — ASPIRIN 81 MG: 81 TABLET, COATED ORAL at 08:14

## 2024-06-16 RX ADMIN — FERROUS SULFATE TAB 325 MG (65 MG ELEMENTAL FE) 325 MG: 325 (65 FE) TAB at 08:13

## 2024-06-16 RX ADMIN — FUROSEMIDE 40 MG: 40 TABLET ORAL at 08:14

## 2024-06-16 RX ADMIN — SODIUM CHLORIDE 1000 MG: 900 INJECTION INTRAVENOUS at 11:07

## 2024-06-16 RX ADMIN — ESTRADIOL 2 APPLICATOR: 0.1 CREAM VAGINAL at 09:30

## 2024-06-16 RX ADMIN — ONDANSETRON 4 MG: 2 INJECTION INTRAMUSCULAR; INTRAVENOUS at 13:59

## 2024-06-16 RX ADMIN — TRAMADOL HYDROCHLORIDE 50 MG: 50 TABLET ORAL at 21:22

## 2024-06-16 RX ADMIN — LOSARTAN POTASSIUM 50 MG: 50 TABLET, FILM COATED ORAL at 08:14

## 2024-06-16 RX ADMIN — FOLIC ACID 1000 MCG: 1 TABLET ORAL at 08:14

## 2024-06-16 RX ADMIN — Medication 1 TABLET: at 08:14

## 2024-06-16 RX ADMIN — TRAMADOL HYDROCHLORIDE 50 MG: 50 TABLET ORAL at 02:36

## 2024-06-16 RX ADMIN — LEFLUNOMIDE 20 MG: 20 TABLET ORAL at 08:13

## 2024-06-16 RX ADMIN — SPIRONOLACTONE 25 MG: 25 TABLET ORAL at 08:13

## 2024-06-16 RX ADMIN — METHENAMINE HIPPURATE 1 G: 1000 TABLET ORAL at 21:23

## 2024-06-16 RX ADMIN — OXYCODONE HYDROCHLORIDE AND ACETAMINOPHEN 500 MG: 500 TABLET ORAL at 08:14

## 2024-06-16 RX ADMIN — ISOSORBIDE MONONITRATE 60 MG: 60 TABLET, EXTENDED RELEASE ORAL at 08:14

## 2024-06-16 NOTE — PLAN OF CARE
Goal Outcome Evaluation:           Progress: improving  Outcome Evaluation: Pt A&OX4. VSS on RA. PPP. C/O pain managed W/ PRN meds. Voiding via purwick. IV to R AC IID. Dressing buttocks C/D/I. Family @ bedside. Call light within reach, safety maintained.

## 2024-06-16 NOTE — PLAN OF CARE
Goal Outcome Evaluation:      Pt Aox4. VSS on RA. Turned Q2 to prevent skin breakdown. Wound care provided per orders. Caregiver at bedside. Dolphin mattress in place. Denies pain. Abx infused per orders. Voiding via external catheter. BM this shift. New UA sample collected and sent to lab d/t previous sample contaminated. Awaiting results. Pt agreeable to staying overnight. Safety maintained.

## 2024-06-17 ENCOUNTER — HOME CARE VISIT (OUTPATIENT)
Dept: HOME HEALTH SERVICES | Facility: CLINIC | Age: 69
End: 2024-06-17
Payer: MEDICARE

## 2024-06-17 LAB
BACTERIA SPEC AEROBE CULT: ABNORMAL
BACTERIA SPEC AEROBE CULT: ABNORMAL
BACTERIA SPEC AEROBE CULT: NORMAL
BACTERIA SPEC AEROBE CULT: NORMAL

## 2024-06-17 PROCEDURE — 97166 OT EVAL MOD COMPLEX 45 MIN: CPT

## 2024-06-17 PROCEDURE — 25010000002 CEFTRIAXONE PER 250 MG: Performed by: FAMILY MEDICINE

## 2024-06-17 PROCEDURE — G0378 HOSPITAL OBSERVATION PER HR: HCPCS

## 2024-06-17 PROCEDURE — 25010000002 CEFEPIME PER 500 MG: Performed by: FAMILY MEDICINE

## 2024-06-17 PROCEDURE — 25010000002 ONDANSETRON PER 1 MG: Performed by: INTERNAL MEDICINE

## 2024-06-17 PROCEDURE — 99214 OFFICE O/P EST MOD 30 MIN: CPT | Performed by: INTERNAL MEDICINE

## 2024-06-17 RX ADMIN — ISOSORBIDE MONONITRATE 60 MG: 60 TABLET, EXTENDED RELEASE ORAL at 08:30

## 2024-06-17 RX ADMIN — ASPIRIN 81 MG: 81 TABLET, COATED ORAL at 08:30

## 2024-06-17 RX ADMIN — Medication 10 ML: at 20:40

## 2024-06-17 RX ADMIN — ESTRADIOL 2 APPLICATOR: 0.1 CREAM VAGINAL at 08:31

## 2024-06-17 RX ADMIN — SPIRONOLACTONE 25 MG: 25 TABLET ORAL at 08:30

## 2024-06-17 RX ADMIN — SODIUM CHLORIDE 1000 MG: 900 INJECTION INTRAVENOUS at 10:32

## 2024-06-17 RX ADMIN — Medication 1 TABLET: at 08:30

## 2024-06-17 RX ADMIN — PANTOPRAZOLE SODIUM 40 MG: 40 TABLET, DELAYED RELEASE ORAL at 08:33

## 2024-06-17 RX ADMIN — OXYCODONE HYDROCHLORIDE AND ACETAMINOPHEN 500 MG: 500 TABLET ORAL at 08:30

## 2024-06-17 RX ADMIN — LEVOTHYROXINE SODIUM 75 MCG: 0.07 TABLET ORAL at 06:29

## 2024-06-17 RX ADMIN — LOSARTAN POTASSIUM 50 MG: 50 TABLET, FILM COATED ORAL at 08:29

## 2024-06-17 RX ADMIN — FUROSEMIDE 40 MG: 40 TABLET ORAL at 08:29

## 2024-06-17 RX ADMIN — CARVEDILOL 25 MG: 25 TABLET, FILM COATED ORAL at 08:30

## 2024-06-17 RX ADMIN — APIXABAN 5 MG: 5 TABLET, FILM COATED ORAL at 08:30

## 2024-06-17 RX ADMIN — ACETAMINOPHEN 650 MG: 325 TABLET, FILM COATED ORAL at 13:36

## 2024-06-17 RX ADMIN — METHENAMINE HIPPURATE 1 G: 1000 TABLET ORAL at 20:40

## 2024-06-17 RX ADMIN — FERROUS SULFATE TAB 325 MG (65 MG ELEMENTAL FE) 325 MG: 325 (65 FE) TAB at 08:30

## 2024-06-17 RX ADMIN — DULOXETINE HYDROCHLORIDE 60 MG: 30 CAPSULE, DELAYED RELEASE ORAL at 08:29

## 2024-06-17 RX ADMIN — TRAMADOL HYDROCHLORIDE 50 MG: 50 TABLET ORAL at 20:40

## 2024-06-17 RX ADMIN — TRAMADOL HYDROCHLORIDE 50 MG: 50 TABLET ORAL at 08:41

## 2024-06-17 RX ADMIN — ONDANSETRON 4 MG: 2 INJECTION INTRAMUSCULAR; INTRAVENOUS at 08:34

## 2024-06-17 RX ADMIN — METHENAMINE HIPPURATE 1 G: 1000 TABLET ORAL at 08:30

## 2024-06-17 RX ADMIN — CEFEPIME 2000 MG: 2 INJECTION, POWDER, FOR SOLUTION INTRAVENOUS at 16:13

## 2024-06-17 RX ADMIN — FOLIC ACID 1000 MCG: 1 TABLET ORAL at 08:29

## 2024-06-17 RX ADMIN — APIXABAN 5 MG: 5 TABLET, FILM COATED ORAL at 20:40

## 2024-06-17 RX ADMIN — LEFLUNOMIDE 20 MG: 20 TABLET ORAL at 08:30

## 2024-06-17 RX ADMIN — Medication 10 ML: at 08:31

## 2024-06-17 NOTE — THERAPY EVALUATION
Acute Care - Occupational Therapy Initial Evaluation  [facility]     Patient Name: Tawny Shin  : 1955  MRN: [id number]  Today's Date: 2024     Date of Referral to OT: 24       Admit Date: 2024       ICD-10-CM ICD-9-CM   1. Acute UTI (urinary tract infection)  N39.0 599.0   2. Altered mental status, unspecified altered mental status type  R41.82 780.97   3. Open wound of right buttock, sequela  S31.819S 906.0   4. Cyst of buttocks  L72.9 706.2   5. Dysphagia, unspecified type  R13.10 787.20     Patient Active Problem List   Diagnosis    T12 compression fracture, initial encounter    Chronic embolism and thrombosis of unspecified deep veins of left lower extremity    Chronic anticoagulation    Iron deficiency anemia    Osteoporosis    E coli bacteremia    Epidural hematoma    Pleural effusion, left    Functional neurological symptom disorder with weakness or paralysis    Class 1 obesity due to excess calories with serious comorbidity and body mass index (BMI) of 33.0 to 33.9 in adult    Rheumatoid arthritis involving multiple sites    Chronic heart failure with preserved ejection fraction    Venous insufficiency    Coronary artery disease involving native coronary artery of native heart without angina pectoris    Sick sinus syndrome    Essential hypertension    Pressure injury of skin of heel    Chronic pain    Anemia of chronic disease    Cholelithiasis    Pressure injury of skin of buttock    Near functional paraplegia    Skin cancer    Squamous cell carcinoma of back    Hematoma    Right-sided chest wall pain    Hyperlipidemia LDL goal <70    Malodorous urine    Stage 3b chronic kidney disease    Cyst of buttocks    Sepsis due to Escherichia coli without acute organ dysfunction    Generalized weakness    Paralysis    History of urinary retention    Bacteremia due to Enterobacter species    Bacteremia    Hypothyroidism    Abnormal CT of the abdomen    Presence of cardiac pacemaker     "Altered mental status    UTI (urinary tract infection)    Esophageal dysphagia     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    Elevated cholesterol     Eustachian tube dysfunction     Heart disease     Herpes simplex     History of transfusion     Hyperlipidemia     Hypertension     Hypothyroidism 12/23/2020    Intrinsic asthma 12/23/2020    Knee dislocation     Labral tear of right hip joint     Laryngitis sicca     Laryngitis, chronic     Left carotid bruit 03/09/2016    MI (myocardial infarction)     Myalgia due to statin 06/25/2019    Open wound of right hip 09/14/2021    Osteomyelitis of right femur 07/06/2021    Otorrhea     Pacemaker 11/17/2016    Primary osteoarthritis of left knee 12/23/2020    Psoriasis vulgaris 12/23/2020    S/P coronary artery stent placement 03/09/2016    Sensorineural hearing loss     Seropositive rheumatoid arthritis of multiple sites 12/27/2019    Overview:  -myochrysine '93-'96 -methotrexate '96--->11/98;r/s  restarted 2/99--> 8/14 (anemia) -sulfasalazine- not effective -penicillamine 6/98-->10/98; no effect -leflunomide 11/98--> - Humira '13-->didn't take - Enbrel 12/14-->3/15- no effect!   Last Assessment & Plan:  - \"aching all over\" because she had to be off her anti-rheumatic drugs for 2 weeks in preparation for her R knee surgery - he    Sick sinus syndrome 12/27/2019    Sjogren's disease     Spondylolisthesis of lumbar region 01/17/2018    Syncope, recurrent " 02/08/2021    Urinary tract infection     UTI (urinary tract infection) 04/19/2024     Past Surgical History:   Procedure Laterality Date    A-V CARDIAC PACEMAKER INSERTION  2016    ATRIAL CARDIAC PACEMAKER INSERTION      CARDIAC CATHETERIZATION      CATARACT EXTRACTION      CERVICAL CORPECTOMY N/A 3/3/2021    Procedure: CERVICAL 6 CORPECTOMY WITH TITANIUM CAGE WITH NEURO MONITORING;  Surgeon: Bandar Shea MD;  Location:  [facility] OR;  Service: Neurosurgery;  Laterality: N/A;    COLONOSCOPY  11/08/2011    One fold in the ascending colon which showed ulcer otherwise normal exam    COLONOSCOPY  11/12/2004    Normal exam repeat in five years    CORONARY ANGIOPLASTY WITH STENT PLACEMENT      X 2; 2013 & 2014    ENDOSCOPY  07/10/2014    Normal exam    ENDOSCOPY N/A 5/30/2024    Procedure: ESOPHAGOGASTRODUODENOSCOPY WITH ANESTHESIA;  Surgeon: Taiwo Sanchez MD;  Location: [facility] ENDOSCOPY;  Service: Gastroenterology;  Laterality: N/A;  preop; dysphagia  postop: balloon dilation  PCP Jesus Manuel jeff    FLAP LEG Right 9/14/2021    Procedure: RIGHT GLUTEAL FASCIOCUTANEOUS ADVANCEMENT FLAP AND RIGHT TENSOR FASCIAL JESSICA FLAP;  Surgeon: Amadeo Turner MD;  Location:  [facility];  Service: Plastics;  Laterality: Right;    HIP ABDUCTION TENOTOMY BILATERAL Right 1/14/2021    Procedure: RIGHT HIP GLUTEUS MEDLUS / MINIMUS REPAIR, POSSIBLE ACHILLES ALLOGRAFT;  Surgeon: Nino Carlson MD;  Location:  [facility];  Service: Orthopedics;  Laterality: Right;    INCISION AND DRAINAGE ABSCESS Right 6/4/2022    Procedure: INCISION AND DRAINAGE ABSCESS right hip;  Surgeon: Magda Salcido MD;  Location:  [facility];  Service: General;  Laterality: Right;    INCISION AND DRAINAGE ABSCESS Right 6/10/2022    Procedure: RIGHT HIP INCISION AND DRAINAGE. MD NEEDS 3L VANC IRRIGATION, CURRETTES, DAICANS, KERLEX ROLLS;  Surgeon: Amadeo Turner MD;  Location:  [facility];  Service: Plastics;  Laterality: Right;    INCISION AND DRAINAGE HIP Right  2/9/2021    Procedure: HIP INCISION AND DRAINAGE;  Surgeon: Nino Carlson MD;  Location:  [facility];  Service: Orthopedics;  Laterality: Right;    INCISION AND DRAINAGE LEG Right 10/24/2021    Procedure: INCISION AND DRAINAGE LOWER EXTREMITY;  Surgeon: Amadeo Turner MD;  Location:  [facility];  Service: Plastics;  Laterality: Right;    INCISION AND DRAINAGE OF WOUND Right 7/8/2021    Procedure: INCISION AND DRAINAGE WOUND RIGHT HIP;  Surgeon: James Huntley MD;  Location:  [facility];  Service: Orthopedics;  Laterality: Right;    JOINT REPLACEMENT      KYPHOPLASTY WITH BIOPSY Bilateral 10/26/2021    Procedure: THOARCIC 12 KYPHOPLASTY WITH BIOPSY;  Surgeon: Bandar Shea MD;  Location:  [facility];  Service: Neurosurgery;  Laterality: Bilateral;    LEG DEBRIDEMENT Right 9/14/2021    Procedure: DEBRIDEMENT OF RIGHT HIP WOUND, RIGHT GLUTEAL FASCIOCUTANEOUS ADVANCEMENT FLAP AND RIGHT TENSOR FASCIAL JESSICA FLAP;  Surgeon: Amadeo Turner MD;  Location:  [facility];  Service: Plastics;  Laterality: Right;    LUMBAR DISCECTOMY Right 3/23/2021    Procedure: LUMBAR DISCECTOMY MICRO, Lumbar 1/2 right;  Surgeon: Bandar Shea MD;  Location:  [facility];  Service: Neurosurgery;  Laterality: Right;    LUMBAR FUSION N/A 1/19/2018    Procedure: L3-4,L4-5 DECOMPRESSION, POSTERIOR SPINAL FUSION WITH INSTRUMENTATION;  Surgeon: Fortino Oropeza MD;  Location:  [facility];  Service:     LUMBAR LAMINECTOMY WITH FUSION Left 1/17/2018    Procedure: LEFT L3-4 L4-5 LATERAL LUMBAR INTERBODY FUSION;  Surgeon: Fortino Oropeza MD;  Location:  [facility];  Service:     MASS EXCISION Right 4/23/2024    Procedure: RIGHT BUTTOCK MASS EXCISION;  Surgeon: Moises Keyes MD;  Location:  [facility];  Service: General;  Laterality: Right;    MYRINGOTOMY W/ TUBES  09/04/2014    TUBES NO LONGER IN PLACE    OTHER SURGICAL HISTORY      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 so hardware was removed and spacers were placed    REPLACEMENT TOTAL  KNEE Right          OT ASSESSMENT FLOWSHEET (Last 12 Hours)       OT Evaluation and Treatment       Row Name 06/17/24 1407                   OT Time and Intention    Subjective Information complains of;weakness;pain  -EC        Document Type evaluation  -EC        Mode of Treatment occupational therapy  -EC           General Information    Patient Profile Reviewed yes  -EC        Prior Level of Function independent:;feeding;grooming;mod assist:;max assist:;dressing;bathing;min assist:;transfer  -EC        Equipment Currently Used at Home walker, rolling;shower chair;grab bar;wheelchair, motorized  -EC        Pertinent History of Current Functional Problem presents with AMS, generalized sickness Dx; UTI, hypoxia PMH: multiple comorbidities & recent hospital admissions  -EC        Existing Precautions/Restrictions fall  -EC        Barriers to Rehab medically complex;previous functional deficit;physical barrier  -EC           Living Environment    Current Living Arrangements home  -EC        People in Home other (see comments)  24/7 caregiver  -EC           Pain Assessment    Pain Location - Side/Orientation Right  -EC        Pain Location lower  -EC        Pain Location - extremity  -EC        Pain Intervention(s) Repositioned;Ambulation/increased activity  -EC        Additional Documentation Pain Scale: FACES Pre/Post-Treatment (Group)  -EC           Pain Scale: FACES Pre/Post-Treatment    Pain: FACES Scale, Pretreatment 4-->hurts little more  -EC        Posttreatment Pain Rating 4-->hurts little more  -EC        Pain Location - Side/Orientation Right  -EC        Pain Location - knee  -EC           Cognition    Orientation Status (Cognition) oriented x 4  -EC           Range of Motion Comprehensive    Comment, General Range of Motion BUE ROM WFL  -EC           Strength Comprehensive (MMT)    Comment, General Manual Muscle Testing (MMT) Assessment BUE 4/5  -EC           Activities of Daily Living    BADL  Assessment/Intervention --  -EC           BADL Safety/Performance    Impairments, BADL Safety/Performance balance;endurance/activity tolerance;pain;strength;sensory awareness  -EC           Bed Mobility    Bed Mobility sit-supine;scooting/bridging;rolling right;rolling left  -EC        Rolling Left [place] (Bed Mobility) moderate assist (50% patient effort)  -EC        Rolling Right [place] (Bed Mobility) moderate assist (50% patient effort)  -EC        Scooting/Bridging [place] (Bed Mobility) dependent (less than 25% patient effort);2 person assist  -EC        Sit-Supine [place] (Bed Mobility) maximum assist (25% patient effort)  -EC        Bed Mobility, Safety Issues decreased use of legs for bridging/pushing  -EC        Assistive Device (Bed Mobility) bed rails;draw sheet  -EC           Transfer Assessment/Treatment    Transfers chair-bed transfer  -EC           Chair-Bed Transfer    Chair-Bed [place] (Transfers) maximum assist (25% patient effort);2 person assist;verbal cues  -EC           Balance    Balance Assessment sitting static balance;sitting dynamic balance;standing static balance;standing dynamic balance  -EC        Static Sitting Balance standby assist  -EC        Dynamic Sitting Balance standby assist  -EC        Position, Sitting Balance sitting in chair;supported  -EC        Static Standing Balance maximum assist  -EC        Dynamic Standing Balance maximum assist  -EC        Position/Device Used, Standing Balance supported;other (see comments)  HHA  -EC           Wound 04/23/24 0936 Right gluteal Incision    Wound - Properties Group Placement Date: 04/23/24  -EP Placement Time: 0936  -EP Present on Original Admission: N  -EP Side: Right  -EP Location: gluteal  -EP Primary Wound Type: Incision  -EP    Retired Wound - Properties Group Placement Date: 04/23/24  -EP Placement Time: 0936  -EP Present on Original Admission: N  -EP Side: Right  -EP Location: gluteal  -EP Primary  Wound Type: Incision  -EP    Retired Wound - Properties Group Date first assessed: 04/23/24  -EP Time first assessed: 0936  -EP Present on Original Admission: N  -EP Side: Right  -EP Location: gluteal  -EP Primary Wound Type: Incision  -EP       Plan of Care Review    Plan of Care Reviewed With patient;caregiver  -EC        Progress no change  -EC        Outcome Evaluation OT eval complete.  Pt A&Ox4 c/o not feeling well & R knee pain. Pt transferred to the chair w nsg earlier & is ready to t/f BTB. Pt unable to stand w assist of 1, required maxAx2 to pivot to the bed. She demos decreased strength & act cara, reports she has had a fxnal decline since recent d/c from hospital. Required mod-maxA for bed mobility. She would benefit from cont OT during hospital stay, anticipate d/c back home w  & 24/7 caregivers.  -EC           Positioning and Restraints    Pre-Treatment Position sitting in chair/recliner  -EC        Post Treatment Position bed  -EC        In Bed notified nsg;fowlers;call light within reach;encouraged to call for assist;with family/caregiver;side rails up x2  -EC           Therapy Assessment/Plan (OT)    Date of Referral to OT 06/17/24  -EC        OT Diagnosis decreased ADLs  -EC        Rehab Potential (OT) good, to achieve stated therapy goals  -EC        Criteria for Skilled Therapeutic Interventions Met (OT) yes;meets criteria;skilled treatment is necessary  -EC        Therapy Frequency (OT) 5 times/wk  -EC        Predicted Duration of Therapy Intervention (OT) 10 days  -EC        Planned Therapy Interventions (OT) activity tolerance training;adaptive equipment training;BADL retraining;functional balance retraining;occupation/activity based interventions;patient/caregiver education/training;strengthening exercise;transfer/mobility retraining  -EC           OT Goals    Transfer Goal Selection (OT) transfer, OT goal 1  -EC        Grooming Goal Selection (OT) grooming, OT goal 1  -EC        Strength  Goal Selection (OT) strength, OT goal 1  -EC           Transfer Goal 1 (OT)    Activity/Assistive Device (Transfer Goal 1, OT) sit-to-stand/stand-to-sit;bed-to-chair/chair-to-bed;toilet;shower chair  -EC        [place] Level/Cues Needed (Transfer Goal 1, OT) minimum assist (75% or more patient effort)  -EC        Time Frame (Transfer Goal 1, OT) long term goal (LTG)  -EC        Progress/Outcome (Transfer Goal 1, OT) new goal  -EC           Grooming Goal 1 (OT)    Activity/Device (Grooming Goal 1, OT) grooming skills, all  -EC        [place] (Grooming Goal 1, OT) standby assist  -EC        Time Frame (Grooming Goal 1, OT) long term goal (LTG)  -EC        Strategies/Barriers (Grooming Goal 1, OT) sitting EOB  -EC        Progress/Outcome (Grooming Goal 1, OT) new goal  -EC           Strength Goal 1 (OT)    Strength Goal 1 (OT) Pt will be I with BUE HEP strengthening to increase her I with ADLs  -EC        Time Frame (Strength Goal 1, OT) long term goal (LTG)  -EC        Progress/Outcome (Strength Goal 1, OT) new goal  -EC                  User Key  (r) = Recorded By, (t) = Taken By, (c) = Cosigned By      Initials Name Effective Dates    EP Sonny Rivero, SARAH 01/22/24 -     EC Ashley Martion OTR/L 10/13/23 -                      Occupational Therapy Education       Title: PT OT SLP Therapies (In Progress)       Topic: Occupational Therapy (In Progress)       Point: ADL training (Done)       Description:   Instruct learner(s) on proper safety adaptation and remediation techniques during self care or transfers.   Instruct in proper use of assistive devices.                  Learning Progress Summary             Patient Acceptance, E, VU by EC at 6/17/2024 1528                         Point: Home exercise program (Not Started)       Description:   Instruct learner(s) on appropriate technique for monitoring, assisting and/or progressing therapeutic exercises/activities.                  Learner Progress:  Not  documented in this visit.              Point: Precautions (Done)       Description:   Instruct learner(s) on prescribed precautions during self-care and functional transfers.                  Learning Progress Summary             Patient Acceptance, E, VU by  at 6/17/2024 1525                         Point: Body mechanics (Done)       Description:   Instruct learner(s) on proper positioning and spine alignment during self-care, functional mobility activities and/or exercises.                  Learning Progress Summary             Patient Acceptance, E, VU by  at 6/17/2024 1525                                         User Key       Initials Effective Dates Name Provider Type Discipline     10/13/23 -  Ashley Martino, OTR/L Occupational Therapist OT                      OT Recommendation and Plan  Planned Therapy Interventions (OT): activity tolerance training, adaptive equipment training, BADL retraining, functional balance retraining, occupation/activity based interventions, patient/caregiver education/training, strengthening exercise, transfer/mobility retraining  Therapy Frequency (OT): 5 times/wk  Plan of Care Review  Plan of Care Reviewed With: patient, caregiver  Progress: no change  Outcome Evaluation: OT eval complete.  Pt A&Ox4 c/o not feeling well & R knee pain. Pt transferred to the chair w nsg earlier & is ready to t/f BTB. Pt unable to stand w assist of 1, required maxAx2 to pivot to the bed. She demos decreased strength & act cara, reports she has had a fxnal decline since recent d/c from hospital. Required mod-maxA for bed mobility. She would benefit from cont OT during hospital stay, anticipate d/c back home w  & 24/7 caregivers.  Plan of Care Reviewed With: patient, caregiver  Outcome Evaluation: OT eval complete.  Pt A&Ox4 c/o not feeling well & R knee pain. Pt transferred to the chair w nsg earlier & is ready to t/f BTB. Pt unable to stand w assist of 1, required maxAx2 to pivot to the bed.  She demos decreased strength & act cara, reports she has had a fxnal decline since recent d/c from hospital. Required mod-maxA for bed mobility. She would benefit from cont OT during hospital stay, anticipate d/c back home w  & 24/7 caregivers.     Outcome Measures       Row Name 06/17/24 1500             How much help from another is currently needed...    Putting on and taking off regular lower body clothing? 1  -EC      Bathing (including washing, rinsing, and drying) 2  -EC      Toileting (which includes using toilet bed pan or urinal) 2  -EC      Putting on and taking off regular upper body clothing 3  -EC      Taking care of personal grooming (such as brushing teeth) 3  -EC      Eating meals 4  -EC      AM-PAC 6 Clicks Score (OT) 15  -EC         Functional Assessment    Outcome Measure Options AM-PAC 6 Clicks Daily Activity (OT)  -EC                User Key  (r) = Recorded By, (t) = Taken By, (c) = Cosigned By      Initials Name Provider Type    EC Ashley Martino, OTR/L Occupational Therapist                    Time Calculation:    Time Calculation- OT       Row Name 06/17/24 1428             Time Calculation- OT    OT Start Time 1400  +13 min CR  -EC      OT Stop Time 1428  -EC      OT Time Calculation (min) 28 min  -EC      OT Received On 06/17/24  -EC      OT Goal Re-Cert Due Date 06/27/24  -EC         Untimed Charges    OT Eval/Re-eval Minutes 41  -EC         Total Minutes    Untimed Charges Total Minutes 41  -EC       Total Minutes 41  -EC                User Key  (r) = Recorded By, (t) = Taken By, (c) = Cosigned By      Initials Name Provider Type    EC Ashley Martino, OTR/L Occupational Therapist                  Therapy Charges for Today       Code Description Service Date Service Provider Modifiers Qty    10980888516 HC OT EVAL MOD COMPLEXITY 3 6/17/2024 Ashley Martino OTR/L GO 1                 KATHYA Salazar/KARUNA  6/17/2024

## 2024-06-17 NOTE — PROGRESS NOTES
[facility] Medicine Services  INPATIENT PROGRESS NOTE    Patient Name: Tawny Shin  Date of Admission: 6/12/2024  Today's Date: 06/17/24  Length of Stay: 0  Primary Care Physician: Moises Oliveira MD    Subjective   Chief Complaint: Recurrent UTIs.  Altered mental status.  HPI   Altered mental status resolved back to baseline.  Blood pressure stable, afebrile.  Since he came back only sensitive to IV antibiotics.  Plan for consult infectious disease, discussed with pharmacy.  Creatinine is improving.  Anemia noted.    Review of Systems   Constitutional:  Positive for activity change, appetite change and fatigue. Negative for chills and fever.   HENT:  Negative for hearing loss, nosebleeds, tinnitus and trouble swallowing.    Eyes:  Negative for visual disturbance.   Respiratory:  Negative for cough, chest tightness, shortness of breath and wheezing.    Cardiovascular:  Negative for chest pain, palpitations and leg swelling.   Gastrointestinal:  Negative for abdominal distention, abdominal pain, blood in stool, constipation, diarrhea, nausea and vomiting.   Endocrine: Negative for cold intolerance, heat intolerance, polydipsia, polyphagia and polyuria.   Genitourinary:  Negative for decreased urine volume, difficulty urinating, dysuria, flank pain, frequency and hematuria.   Musculoskeletal:  Positive for arthralgias, gait problem and myalgias. Negative for joint swelling.   Skin:  Negative for rash.   Allergic/Immunologic: Negative for immunocompromised state.   Neurological:  Positive for weakness. Negative for dizziness, syncope, light-headedness and headaches.   Hematological:  Negative for adenopathy. Does not bruise/bleed easily.   Psychiatric/Behavioral:  Negative for confusion and sleep disturbance. The patient is not nervous/anxious.         All pertinent negatives and positives are as above. All other systems have been reviewed and are negative unless otherwise  stated.     Objective    Temp:  [98 °F (36.7 °C)-98.5 °F (36.9 °C)] 98.3 °F (36.8 °C)  Heart Rate:  [74-86] 86  Resp:  [18] 18  BP: (116-147)/(43-73) 116/57  Physical Exam  Vitals and nursing note reviewed.   Constitutional:       Comments: Chronically ill.  Deconditioning.   HENT:      Head: Normocephalic.   Eyes:      Conjunctiva/sclera: Conjunctivae normal.      Pupils: Pupils are equal, round, and reactive to light.   Cardiovascular:      Rate and Rhythm: Normal rate and regular rhythm.      Heart sounds: Normal heart sounds.   Pulmonary:      Effort: Pulmonary effort is normal. No respiratory distress.      Breath sounds: Normal breath sounds.   Abdominal:      General: Bowel sounds are normal. There is no distension.      Palpations: Abdomen is soft.      Tenderness: There is no abdominal tenderness.      Comments: Obesity.   Musculoskeletal:         General: No swelling.      Cervical back: Neck supple.   Skin:     General: Skin is warm and dry.      Capillary Refill: Capillary refill takes 2 to 3 seconds.      Findings: No rash.   Neurological:      Mental Status: She is alert and oriented to person, place, and time.      Motor: Weakness present.      Coordination: Coordination abnormal.      Gait: Gait abnormal.   Psychiatric:         Mood and Affect: Mood normal.         Behavior: Behavior normal.         Thought Content: Thought content normal.             Results Review:  I have reviewed the labs, radiology results, and diagnostic studies.    Laboratory Data:   Results from last 7 days   Lab Units 06/12/24  2112   WBC 10*3/mm3 8.62   HEMOGLOBIN g/dL 8.9*   HEMATOCRIT % 29.3*   PLATELETS 10*3/mm3 164        Results from last 7 days   Lab Units 06/13/24  0441 06/12/24  2112   SODIUM mmol/L 138 138   POTASSIUM mmol/L 4.0 3.7   CHLORIDE mmol/L 102 100   CO2 mmol/L 25.0 26.0   BUN mg/dL 18 19   CREATININE mg/dL 1.09* 1.13*   CALCIUM mg/dL 8.1* 8.5*   BILIRUBIN mg/dL  --  0.5   ALK PHOS U/L  --  77   ALT  (SGPT) U/L  --  23   AST (SGOT) U/L  --  27   GLUCOSE mg/dL 155* 132*       Culture Data:   Blood Culture   Date Value Ref Range Status   06/12/2024 No growth at 4 days  Preliminary   06/12/2024 No growth at 4 days  Preliminary     Urine Culture   Date Value Ref Range Status   06/12/2024 >100,000 CFU/mL Enterobacter cloacae complex (A)  Preliminary     Comment:     MICs to follow   06/12/2024 50,000 CFU/mL Mixed Selina Isolated  Preliminary       Radiology Data:   Imaging Results (Last 24 Hours)       ** No results found for the last 24 hours. **            I have reviewed the patient's current medications.     Assessment/Plan   Assessment  Active Hospital Problems    Diagnosis     **Altered mental status     Esophageal dysphagia     UTI (urinary tract infection)     Hypothyroidism     Stage 3b chronic kidney disease     Essential hypertension     Sick sinus syndrome     Venous insufficiency     Rheumatoid arthritis involving multiple sites     Chronic heart failure with preserved ejection fraction     Chronic anticoagulation     Chronic embolism and thrombosis of unspecified deep veins of left lower extremity        Treatment Plan    Altered mental status.  Resolved.  CT scan the head-No acute intracranial abnormality, Chronic sinusitis and left mastoid effusion.  Chest x-ray-Stable chest exam without acute process, No visualized infiltrate.   Patient is on room air.    UTI.  Rocephin antibiotics.  Continue Hip-Stef.  Repeat UA, negative for bacteria.  Consult ID    Reflux/esophageal dysphagia. Dysphagia recent esophageal stretching, GI evaluated and recommended to continue outpatient follow-up.  SLP evaluated and recommended to continue regular diet.  Protonix . Zofran as needed.    Hypothyroidism.  Synthroid.    Chronic stage IIIb renal failure.  Creatinine is improving.    Hypomagnesia. Resolved.    Hypertension/sick sinus syndrome/venous insufficiency/CHF.  Eliquis.  Aspirin . Coreg . Lasix.  Imdur.  Cozaar .  Aldactone.  Nitro as needed.    Rheumatoid arthritis. Arava.    Depression/anxiety.  Cymbalta.    Postmenopausal.  Estradiol cream.    Anemia.  Iron sulfate.  Folic acid.    Pain . lidocaine patch.  Voltaren gel.  Ultram as needed.    Chronic DVT.    Nutrition.  Cardiac diet.  Vitamin C.  Multivitamins.  Regular/house diet.  Boost supplement.    Deconditioning.  PT and OT consult.  Patient use a walker and bedbound at baseline.    Urine culture-100,000 CFU/mL Enterobacter cloacae complex  Blood culture-no growth for 4 days.    Dr. Shin's mom.  Patient already have home health at home.  Patient request for lift at discharge.    Medical Decision Making  Number and Complexity of problems: Recurrent UTI/altered mental status/reflux/rheumatoid arthritis/chronic 3B renal failure  Differential Diagnosis: None    Conditions and Status        Condition is unchanged.     [facility] Data  External documents reviewed: Previous note .  Cardiac tracing (EKG, telemetry) interpretation: Sinus.  Radiology interpretation: X-ray/CT scan of the head  Labs reviewed: Laboratory  Any tests that were considered but not ordered: Lab in a.m.     Decision rules/scores evaluated (example [id number], [place], etc): None     Discussed with: Patient and family     Care Planning  Shared decision making: Patient and family  Code status and discussions: DNR    Disposition  Social Determinants of Health that impact treatment or disposition: From home  1 to 3 days.    Electronically signed by Saulo Ambriz MD, 06/17/24, 07:32 CDT.

## 2024-06-17 NOTE — CASE COMMUNICATION
Patient missed a PT visit from [facility] on 6/17/24    Reason: Patient currently in hospital.       For your records only.   Per CMS Guidance, MD must be notified of missed/cancelled visits; therefore the prescribed frequency was not met.

## 2024-06-17 NOTE — CASE MANAGEMENT/SOCIAL WORK
Continued Stay Note  [facility]     Patient Name: Tawny Shin  MRN: [id number]  Today's Date: 6/17/2024    Admit Date: 6/12/2024    Plan: [facility]   Discharge Plan       Row Name 06/17/24 1327       Plan    Plan [facility]    Plan Comments Chart reviewed. Patient plans to return home at discharge and says she has adequate help from caregiver and family. Patient wants to continue home health services through [facility]. SS will continue to follow.             OSIEL Hernandez

## 2024-06-18 ENCOUNTER — TELEPHONE (OUTPATIENT)
Dept: PODIATRY | Facility: CLINIC | Age: 69
End: 2024-06-18
Payer: MEDICARE

## 2024-06-18 LAB
ANION GAP SERPL CALCULATED.3IONS-SCNC: 9 MMOL/L (ref 5–15)
BUN SERPL-MCNC: 23 MG/DL (ref 8–23)
BUN/CREAT SERPL: 21.3 (ref 7–25)
CALCIUM SPEC-SCNC: 8.4 MG/DL (ref 8.6–10.5)
CHLORIDE SERPL-SCNC: 100 MMOL/L (ref 98–107)
CO2 SERPL-SCNC: 27 MMOL/L (ref 22–29)
CREAT SERPL-MCNC: 1.08 MG/DL (ref 0.57–1)
DEPRECATED RDW RBC AUTO: 63.4 FL (ref 37–54)
EGFRCR SERPLBLD CKD-EPI 2021: 55.4 ML/MIN/1.73
ERYTHROCYTE [DISTWIDTH] IN BLOOD BY AUTOMATED COUNT: 18.8 % (ref 12.3–15.4)
GLUCOSE SERPL-MCNC: 122 MG/DL (ref 65–99)
HCT VFR BLD AUTO: 32.4 % (ref 34–46.6)
HGB BLD-MCNC: 9.7 G/DL (ref 12–15.9)
MCH RBC QN AUTO: 28.1 PG (ref 26.6–33)
MCHC RBC AUTO-ENTMCNC: 29.9 G/DL (ref 31.5–35.7)
MCV RBC AUTO: 93.9 FL (ref 79–97)
PLATELET # BLD AUTO: 174 10*3/MM3 (ref 140–450)
PMV BLD AUTO: 11.9 FL (ref 6–12)
POTASSIUM SERPL-SCNC: 4.1 MMOL/L (ref 3.5–5.2)
RBC # BLD AUTO: 3.45 10*6/MM3 (ref 3.77–5.28)
SODIUM SERPL-SCNC: 136 MMOL/L (ref 136–145)
WBC NRBC COR # BLD AUTO: 6.75 10*3/MM3 (ref 3.4–10.8)

## 2024-06-18 PROCEDURE — 97161 PT EVAL LOW COMPLEX 20 MIN: CPT | Performed by: PHYSICAL THERAPIST

## 2024-06-18 PROCEDURE — 25010000002 CEFEPIME PER 500 MG: Performed by: FAMILY MEDICINE

## 2024-06-18 PROCEDURE — 97535 SELF CARE MNGMENT TRAINING: CPT

## 2024-06-18 PROCEDURE — 25010000002 ONDANSETRON PER 1 MG: Performed by: INTERNAL MEDICINE

## 2024-06-18 PROCEDURE — 99214 OFFICE O/P EST MOD 30 MIN: CPT | Performed by: INTERNAL MEDICINE

## 2024-06-18 PROCEDURE — G0378 HOSPITAL OBSERVATION PER HR: HCPCS

## 2024-06-18 PROCEDURE — 85027 COMPLETE CBC AUTOMATED: CPT | Performed by: FAMILY MEDICINE

## 2024-06-18 PROCEDURE — 80048 BASIC METABOLIC PNL TOTAL CA: CPT | Performed by: FAMILY MEDICINE

## 2024-06-18 RX ADMIN — ASPIRIN 81 MG: 81 TABLET, COATED ORAL at 08:43

## 2024-06-18 RX ADMIN — METHENAMINE HIPPURATE 1 G: 1000 TABLET ORAL at 08:43

## 2024-06-18 RX ADMIN — FUROSEMIDE 40 MG: 40 TABLET ORAL at 08:41

## 2024-06-18 RX ADMIN — LEVOTHYROXINE SODIUM 75 MCG: 0.07 TABLET ORAL at 06:06

## 2024-06-18 RX ADMIN — ONDANSETRON 4 MG: 2 INJECTION INTRAMUSCULAR; INTRAVENOUS at 17:17

## 2024-06-18 RX ADMIN — ONDANSETRON 4 MG: 2 INJECTION INTRAMUSCULAR; INTRAVENOUS at 08:50

## 2024-06-18 RX ADMIN — LEFLUNOMIDE 20 MG: 20 TABLET ORAL at 08:42

## 2024-06-18 RX ADMIN — ISOSORBIDE MONONITRATE 60 MG: 60 TABLET, EXTENDED RELEASE ORAL at 08:41

## 2024-06-18 RX ADMIN — Medication 10 ML: at 21:33

## 2024-06-18 RX ADMIN — APIXABAN 5 MG: 5 TABLET, FILM COATED ORAL at 08:41

## 2024-06-18 RX ADMIN — PANTOPRAZOLE SODIUM 40 MG: 40 TABLET, DELAYED RELEASE ORAL at 08:42

## 2024-06-18 RX ADMIN — SPIRONOLACTONE 25 MG: 25 TABLET ORAL at 08:43

## 2024-06-18 RX ADMIN — LOSARTAN POTASSIUM 50 MG: 50 TABLET, FILM COATED ORAL at 08:41

## 2024-06-18 RX ADMIN — TRAMADOL HYDROCHLORIDE 50 MG: 50 TABLET ORAL at 21:32

## 2024-06-18 RX ADMIN — CARVEDILOL 25 MG: 25 TABLET, FILM COATED ORAL at 08:41

## 2024-06-18 RX ADMIN — ESTRADIOL 2 APPLICATOR: 0.1 CREAM VAGINAL at 08:42

## 2024-06-18 RX ADMIN — OXYCODONE HYDROCHLORIDE AND ACETAMINOPHEN 500 MG: 500 TABLET ORAL at 08:41

## 2024-06-18 RX ADMIN — APIXABAN 5 MG: 5 TABLET, FILM COATED ORAL at 21:32

## 2024-06-18 RX ADMIN — Medication 10 ML: at 08:43

## 2024-06-18 RX ADMIN — METHENAMINE HIPPURATE 1 G: 1000 TABLET ORAL at 21:32

## 2024-06-18 RX ADMIN — Medication 1 TABLET: at 08:41

## 2024-06-18 RX ADMIN — CEFEPIME 2000 MG: 2 INJECTION, POWDER, FOR SOLUTION INTRAVENOUS at 12:13

## 2024-06-18 RX ADMIN — FERROUS SULFATE TAB 325 MG (65 MG ELEMENTAL FE) 325 MG: 325 (65 FE) TAB at 08:41

## 2024-06-18 RX ADMIN — CEFEPIME 2000 MG: 2 INJECTION, POWDER, FOR SOLUTION INTRAVENOUS at 00:15

## 2024-06-18 RX ADMIN — FOLIC ACID 1000 MCG: 1 TABLET ORAL at 08:41

## 2024-06-18 RX ADMIN — CARVEDILOL 25 MG: 25 TABLET, FILM COATED ORAL at 17:17

## 2024-06-18 RX ADMIN — DULOXETINE HYDROCHLORIDE 60 MG: 30 CAPSULE, DELAYED RELEASE ORAL at 08:41

## 2024-06-18 NOTE — PLAN OF CARE
Goal Outcome Evaluation:  Plan of Care Reviewed With: patient        Progress: declining  Outcome Evaluation: Pt alert this AM when [facility] entered room. Pt agreeable to attempt transfer to recliner. Pt max A to come to EOB, 3 attempts to stand from elevated EOB unsuccessful. Pt was able to complete partial stand from EOB with max A x2 and RW. [facility] discussed with pt potential of benefit of rehab prior to discharge  back home. Pt states that she plans to discharge home. [facility] provided potential list of DME to assist with transfers. Pt would benefit from rehab at discharge. Continue OT POC

## 2024-06-18 NOTE — THERAPY TREATMENT NOTE
Acute Care - Occupational Therapy Treatment Note  [facility]     Patient Name: Tawny Shin  : 1955  MRN: [id number]  Today's Date: 2024     Date of Referral to OT: 24       Admit Date: 2024       ICD-10-CM ICD-9-CM   1. Acute UTI (urinary tract infection)  N39.0 599.0   2. Altered mental status, unspecified altered mental status type  R41.82 780.97   3. Open wound of right buttock, sequela  S31.819S 906.0   4. Cyst of buttocks  L72.9 706.2   5. Dysphagia, unspecified type  R13.10 787.20     Patient Active Problem List   Diagnosis    T12 compression fracture, initial encounter    Chronic embolism and thrombosis of unspecified deep veins of left lower extremity    Chronic anticoagulation    Iron deficiency anemia    Osteoporosis    E coli bacteremia    Epidural hematoma    Pleural effusion, left    Functional neurological symptom disorder with weakness or paralysis    Class 1 obesity due to excess calories with serious comorbidity and body mass index (BMI) of 33.0 to 33.9 in adult    Rheumatoid arthritis involving multiple sites    Chronic heart failure with preserved ejection fraction    Venous insufficiency    Coronary artery disease involving native coronary artery of native heart without angina pectoris    Sick sinus syndrome    Essential hypertension    Pressure injury of skin of heel    Chronic pain    Anemia of chronic disease    Cholelithiasis    Pressure injury of skin of buttock    Near functional paraplegia    Skin cancer    Squamous cell carcinoma of back    Hematoma    Right-sided chest wall pain    Hyperlipidemia LDL goal <70    Malodorous urine    Stage 3b chronic kidney disease    Cyst of buttocks    Sepsis due to Escherichia coli without acute organ dysfunction    Generalized weakness    Paralysis    History of urinary retention    Bacteremia due to Enterobacter species    Bacteremia    Hypothyroidism    Abnormal CT of the abdomen    Presence of cardiac pacemaker     "Altered mental status    UTI (urinary tract infection)    Esophageal dysphagia     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    Elevated cholesterol     Eustachian tube dysfunction     Heart disease     Herpes simplex     History of transfusion     Hyperlipidemia     Hypertension     Hypothyroidism 12/23/2020    Intrinsic asthma 12/23/2020    Knee dislocation     Labral tear of right hip joint     Laryngitis sicca     Laryngitis, chronic     Left carotid bruit 03/09/2016    MI (myocardial infarction)     Myalgia due to statin 06/25/2019    Open wound of right hip 09/14/2021    Osteomyelitis of right femur 07/06/2021    Otorrhea     Pacemaker 11/17/2016    Primary osteoarthritis of left knee 12/23/2020    Psoriasis vulgaris 12/23/2020    S/P coronary artery stent placement 03/09/2016    Sensorineural hearing loss     Seropositive rheumatoid arthritis of multiple sites 12/27/2019    Overview:  -myochrysine '93-'96 -methotrexate '96--->11/98;r/s  restarted 2/99--> 8/14 (anemia) -sulfasalazine- not effective -penicillamine 6/98-->10/98; no effect -leflunomide 11/98--> - Humira '13-->didn't take - Enbrel 12/14-->3/15- no effect!   Last Assessment & Plan:  - \"aching all over\" because she had to be off her anti-rheumatic drugs for 2 weeks in preparation for her R knee surgery - he    Sick sinus syndrome 12/27/2019    Sjogren's disease     Spondylolisthesis of lumbar region 01/17/2018    Syncope, recurrent " 02/08/2021    Urinary tract infection     UTI (urinary tract infection) 04/19/2024     Past Surgical History:   Procedure Laterality Date    A-V CARDIAC PACEMAKER INSERTION  2016    ATRIAL CARDIAC PACEMAKER INSERTION      CARDIAC CATHETERIZATION      CATARACT EXTRACTION      CERVICAL CORPECTOMY N/A 3/3/2021    Procedure: CERVICAL 6 CORPECTOMY WITH TITANIUM CAGE WITH NEURO MONITORING;  Surgeon: Bandar Shea MD;  Location:  [facility];  Service: Neurosurgery;  Laterality: N/A;    COLONOSCOPY  11/08/2011    One fold in the ascending colon which showed ulcer otherwise normal exam    COLONOSCOPY  11/12/2004    Normal exam repeat in five years    CORONARY ANGIOPLASTY WITH STENT PLACEMENT      X 2; 2013 & 2014    ENDOSCOPY  07/10/2014    Normal exam    ENDOSCOPY N/A 5/30/2024    Procedure: ESOPHAGOGASTRODUODENOSCOPY WITH ANESTHESIA;  Surgeon: Taiwo Sanchez MD;  Location: [facility];  Service: Gastroenterology;  Laterality: N/A;  preop; dysphagia  postop: balloon dilation  PCP Jesus Manuel jeff    FLAP LEG Right 9/14/2021    Procedure: RIGHT GLUTEAL FASCIOCUTANEOUS ADVANCEMENT FLAP AND RIGHT TENSOR FASCIAL JESSICA FLAP;  Surgeon: Amadeo Turner MD;  Location:  [facility];  Service: Plastics;  Laterality: Right;    HIP ABDUCTION TENOTOMY BILATERAL Right 1/14/2021    Procedure: RIGHT HIP GLUTEUS MEDLUS / MINIMUS REPAIR, POSSIBLE ACHILLES ALLOGRAFT;  Surgeon: Nino Carlson MD;  Location:  [facility];  Service: Orthopedics;  Laterality: Right;    INCISION AND DRAINAGE ABSCESS Right 6/4/2022    Procedure: INCISION AND DRAINAGE ABSCESS right hip;  Surgeon: Magda Salcido MD;  Location:  [facility];  Service: General;  Laterality: Right;    INCISION AND DRAINAGE ABSCESS Right 6/10/2022    Procedure: RIGHT HIP INCISION AND DRAINAGE. MD NEEDS 3L VANC IRRIGATION, CURRETTES, DAICANS, KERLEX ROLLS;  Surgeon: Amadeo Turner MD;  Location:  [facility];  Service: Plastics;  Laterality: Right;    INCISION AND DRAINAGE HIP Right  2/9/2021    Procedure: HIP INCISION AND DRAINAGE;  Surgeon: Nino Carlson MD;  Location:  [facility];  Service: Orthopedics;  Laterality: Right;    INCISION AND DRAINAGE LEG Right 10/24/2021    Procedure: INCISION AND DRAINAGE LOWER EXTREMITY;  Surgeon: Amadeo Turner MD;  Location:  [facility];  Service: Plastics;  Laterality: Right;    INCISION AND DRAINAGE OF WOUND Right 7/8/2021    Procedure: INCISION AND DRAINAGE WOUND RIGHT HIP;  Surgeon: James Huntley MD;  Location:  [facility];  Service: Orthopedics;  Laterality: Right;    JOINT REPLACEMENT      KYPHOPLASTY WITH BIOPSY Bilateral 10/26/2021    Procedure: THOARCIC 12 KYPHOPLASTY WITH BIOPSY;  Surgeon: Bandar Shea MD;  Location:  [facility];  Service: Neurosurgery;  Laterality: Bilateral;    LEG DEBRIDEMENT Right 9/14/2021    Procedure: DEBRIDEMENT OF RIGHT HIP WOUND, RIGHT GLUTEAL FASCIOCUTANEOUS ADVANCEMENT FLAP AND RIGHT TENSOR FASCIAL JESSICA FLAP;  Surgeon: Amadeo Turner MD;  Location:  [facility];  Service: Plastics;  Laterality: Right;    LUMBAR DISCECTOMY Right 3/23/2021    Procedure: LUMBAR DISCECTOMY MICRO, Lumbar 1/2 right;  Surgeon: Bandar Shea MD;  Location:  [facility];  Service: Neurosurgery;  Laterality: Right;    LUMBAR FUSION N/A 1/19/2018    Procedure: L3-4,L4-5 DECOMPRESSION, POSTERIOR SPINAL FUSION WITH INSTRUMENTATION;  Surgeon: Fortino Oropeza MD;  Location:  [facility];  Service:     LUMBAR LAMINECTOMY WITH FUSION Left 1/17/2018    Procedure: LEFT L3-4 L4-5 LATERAL LUMBAR INTERBODY FUSION;  Surgeon: Fortino Oropeza MD;  Location:  [facility];  Service:     MASS EXCISION Right 4/23/2024    Procedure: RIGHT BUTTOCK MASS EXCISION;  Surgeon: Moises Keyes MD;  Location:  [facility];  Service: General;  Laterality: Right;    MYRINGOTOMY W/ TUBES  09/04/2014    TUBES NO LONGER IN PLACE    OTHER SURGICAL HISTORY      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 so hardware was removed and spacers were placed    REPLACEMENT TOTAL  KNEE Right          OT ASSESSMENT FLOWSHEET (Last 12 Hours)       OT Evaluation and Treatment       Row Name 06/18/24 0750                   OT Time and Intention    Subjective Information complains of;pain;fatigue  -TS        Document Type therapy note (daily note)  -TS        Mode of Treatment occupational therapy  -TS        Patient Effort fair  -TS           General Information    Existing Precautions/Restrictions fall  -TS           Pain Assessment    Pretreatment Pain Rating 2/10  -TS        Posttreatment Pain Rating 5/10  -TS        Pain Location - Side/Orientation Bilateral  -TS        Pain Location lower  -TS        Pain Location - extremity  -TS        Pain Intervention(s) Repositioned  -TS           Cognition    Orientation Status (Cognition) oriented x 4  -TS        Personal Safety Interventions fall prevention program maintained;gait belt;nonskid shoes/slippers when out of bed  -TS           Bed Mobility    Bed Mobility supine-sit;sit-supine;rolling left;rolling right  -TS        Rolling Left [place] (Bed Mobility) moderate assist (50% patient effort);maximum assist (25% patient effort)  -TS        Rolling Right [place] (Bed Mobility) moderate assist (50% patient effort);maximum assist (25% patient effort)  -TS        Scooting/Bridging [place] (Bed Mobility) dependent (less than 25% patient effort);unable to assess  -TS        Supine-Sit [place] (Bed Mobility) maximum assist (25% patient effort)  -TS        Sit-Supine [place] (Bed Mobility) maximum assist (25% patient effort)  -TS        Assistive Device (Bed Mobility) bed rails;draw sheet  -TS           Transfer Assessment/Treatment    Transfers sit-stand transfer;stand-sit transfer  -TS           Sit-Stand Transfer    Sit-Stand [place] (Transfers) maximum assist (25% patient effort);2 person assist  -TS        Assistive Device (Sit-Stand Transfers) walker, front-wheeled  -TS        Comment, (Sit-Stand Transfer) unable to  come to a complete stand  -TS           Stand-Sit Transfer    Stand-Sit [place] (Transfers) maximum assist (25% patient effort)  -TS        Assistive Device (Stand-Sit Transfers) walker, front-wheeled  -TS           Balance    Static Sitting Balance standby assist;contact guard  -TS           Wound 04/23/24 0936 Right gluteal Incision    Wound - Properties Group Placement Date: 04/23/24  -EP Placement Time: 0936  -EP Present on Original Admission: N  -EP Side: Right  -EP Location: gluteal  -EP Primary Wound Type: Incision  -EP    Retired Wound - Properties Group Placement Date: 04/23/24  -EP Placement Time: 0936  -EP Present on Original Admission: N  -EP Side: Right  -EP Location: gluteal  -EP Primary Wound Type: Incision  -EP    Retired Wound - Properties Group Date first assessed: 04/23/24  -EP Time first assessed: 0936  -EP Present on Original Admission: N  -EP Side: Right  -EP Location: gluteal  -EP Primary Wound Type: Incision  -EP       Plan of Care Review    Plan of Care Reviewed With patient  -TS        Progress declining  -TS        Outcome Evaluation Pt alert this AM when CONTI entered room. Pt agreeable to attempt transfer to recliner. Pt max A to come to EOB, 3 attempts to stand from elevated EOB unsuccessful. Pt was able to complete partial stand from EOB with max A x2 and RW. CONTI discussed with pt potential of benefit of rehab prior to discharge  back home. Pt states that she plans to discharge home. CONTI provided potential list of DME to assist with transfers. Pt would benefit from rehab at discharge. Continue OT POC  -TS           Positioning and Restraints    Pre-Treatment Position in bed  -TS        Post Treatment Position bed  -TS        In Bed fowlers;call light within reach;encouraged to call for assist;with family/caregiver;side rails up x3  -TS                  User Key  (r) = Recorded By, (t) = Taken By, (c) = Cosigned By      Initials Name Effective Dates    TS Carolee Giordano,  CONTI 02/03/23 -     Sonny Thompson, RN 01/22/24 -                      Occupational Therapy Education       Title: PT OT SLP Therapies (In Progress)       Topic: Occupational Therapy (In Progress)       Point: ADL training (Done)       Description:   Instruct learner(s) on proper safety adaptation and remediation techniques during self care or transfers.   Instruct in proper use of assistive devices.                  Learning Progress Summary             Patient Acceptance, E, VU by  at 6/17/2024 1525                         Point: Home exercise program (Not Started)       Description:   Instruct learner(s) on appropriate technique for monitoring, assisting and/or progressing therapeutic exercises/activities.                  Learner Progress:  Not documented in this visit.              Point: Precautions (Done)       Description:   Instruct learner(s) on prescribed precautions during self-care and functional transfers.                  Learning Progress Summary             Patient Acceptance, E, VU by  at 6/17/2024 1525                         Point: Body mechanics (Done)       Description:   Instruct learner(s) on proper positioning and spine alignment during self-care, functional mobility activities and/or exercises.                  Learning Progress Summary             Patient Acceptance, E, VU by  at 6/17/2024 1525                                         User Key       Initials Effective Dates Name Provider Type Discipline     10/13/23 -  Ashley Martino, OTR/L Occupational Therapist OT                      OT Recommendation and Plan     Plan of Care Review  Plan of Care Reviewed With: patient  Progress: declining  Outcome Evaluation: Pt alert this AM when CONTI entered room. Pt agreeable to attempt transfer to recliner. Pt max A to come to EOB, 3 attempts to stand from elevated EOB unsuccessful. Pt was able to complete partial stand from EOB with max A x2 and RW. CONTI discussed with pt potential of  benefit of rehab prior to discharge  back home. Pt states that she plans to discharge home. CONTI provided potential list of DME to assist with transfers. Pt would benefit from rehab at discharge. Continue OT POC  Plan of Care Reviewed With: patient  Outcome Evaluation: Pt alert this AM when CONTI entered room. Pt agreeable to attempt transfer to recliner. Pt max A to come to EOB, 3 attempts to stand from elevated EOB unsuccessful. Pt was able to complete partial stand from EOB with max A x2 and RW. CONTI discussed with pt potential of benefit of rehab prior to discharge  back home. Pt states that she plans to discharge home. CONTI provided potential list of DME to assist with transfers. Pt would benefit from rehab at discharge. Continue OT POC     Outcome Measures       Row Name 06/18/24 1400 06/17/24 1500          How much help from another is currently needed...    Putting on and taking off regular lower body clothing? 1  -TS 1  -EC     Bathing (including washing, rinsing, and drying) 2  -TS 2  -EC     Toileting (which includes using toilet bed pan or urinal) 2  -TS 2  -EC     Putting on and taking off regular upper body clothing 3  -TS 3  -EC     Taking care of personal grooming (such as brushing teeth) 3  -TS 3  -EC     Eating meals 4  -TS 4  -EC     AM-PAC 6 Clicks Score (OT) 15  -TS 15  -EC        Functional Assessment    Outcome Measure Options -- AM-PAC 6 Clicks Daily Activity (OT)  -EC               User Key  (r) = Recorded By, (t) = Taken By, (c) = Cosigned By      Initials Name Provider Type    Carolee Roger COTA Occupational Therapist Assistant    EC Ashley Martino, OTR/L Occupational Therapist                    Time Calculation:    Time Calculation- OT       Row Name 06/18/24 1450             Time Calculation- OT    OT Start Time 0750  -TS      OT Stop Time 0845  -TS      OT Time Calculation (min) 55 min  -TS      Total Timed Code Minutes- OT 55 minute(s)  -TS      OT Received On 06/18/24  -TS          Timed Charges    47251 - OT Self Care/Mgmt Minutes 55  -TS         Total Minutes    Timed Charges Total Minutes 55  -TS       Total Minutes 55  -TS                User Key  (r) = Recorded By, (t) = Taken By, (c) = Cosigned By      Initials Name Provider Type    Carolee Roger COTA Occupational Therapist Assistant                  Therapy Charges for Today       Code Description Service Date Service Provider Modifiers Qty    [id number] HC OT SELF CARE/MGMT/TRAIN EA 15 MIN 6/18/2024 Carolee Giordano COTA GO 4                 GALLITO Narvaez  6/18/2024

## 2024-06-18 NOTE — THERAPY EVALUATION
Patient Name: Tawny Shin  : 1955    MRN: [id number]                              Today's Date: 2024       Admit Date: 2024    Visit Dx:     ICD-10-CM ICD-9-CM   1. Acute UTI (urinary tract infection)  N39.0 599.0   2. Altered mental status, unspecified altered mental status type  R41.82 780.97   3. Open wound of right buttock, sequela  S31.819S 906.0   4. Cyst of buttocks  L72.9 706.2   5. Dysphagia, unspecified type  R13.10 787.20   6. Impaired mobility [Z74.09]  Z74.09 799.89     Patient Active Problem List   Diagnosis    T12 compression fracture, initial encounter    Chronic embolism and thrombosis of unspecified deep veins of left lower extremity    Chronic anticoagulation    Iron deficiency anemia    Osteoporosis    E coli bacteremia    Epidural hematoma    Pleural effusion, left    Functional neurological symptom disorder with weakness or paralysis    Class 1 obesity due to excess calories with serious comorbidity and body mass index (BMI) of 33.0 to 33.9 in adult    Rheumatoid arthritis involving multiple sites    Chronic heart failure with preserved ejection fraction    Venous insufficiency    Coronary artery disease involving native coronary artery of native heart without angina pectoris    Sick sinus syndrome    Essential hypertension    Pressure injury of skin of heel    Chronic pain    Anemia of chronic disease    Cholelithiasis    Pressure injury of skin of buttock    Near functional paraplegia    Skin cancer    Squamous cell carcinoma of back    Hematoma    Right-sided chest wall pain    Hyperlipidemia LDL goal <70    Malodorous urine    Stage 3b chronic kidney disease    Cyst of buttocks    Sepsis due to Escherichia coli without acute organ dysfunction    Generalized weakness    Paralysis    History of urinary retention    Bacteremia due to Enterobacter species    Bacteremia    Hypothyroidism    Abnormal CT of the abdomen    Presence of cardiac pacemaker    Altered mental  "status    UTI (urinary tract infection)    Esophageal dysphagia     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    Elevated cholesterol     Eustachian tube dysfunction     Heart disease     Herpes simplex     History of transfusion     Hyperlipidemia     Hypertension     Hypothyroidism 12/23/2020    Intrinsic asthma 12/23/2020    Knee dislocation     Labral tear of right hip joint     Laryngitis sicca     Laryngitis, chronic     Left carotid bruit 03/09/2016    MI (myocardial infarction)     Myalgia due to statin 06/25/2019    Open wound of right hip 09/14/2021    Osteomyelitis of right femur 07/06/2021    Otorrhea     Pacemaker 11/17/2016    Primary osteoarthritis of left knee 12/23/2020    Psoriasis vulgaris 12/23/2020    S/P coronary artery stent placement 03/09/2016    Sensorineural hearing loss     Seropositive rheumatoid arthritis of multiple sites 12/27/2019    Overview:  -myochrysine '93-'96 -methotrexate '96--->11/98;r/s  restarted 2/99--> 8/14 (anemia) -sulfasalazine- not effective -penicillamine 6/98-->10/98; no effect -leflunomide 11/98--> - Humira '13-->didn't take - Enbrel 12/14-->3/15- no effect!   Last Assessment & Plan:  - \"aching all over\" because she had to be off her anti-rheumatic drugs for 2 weeks in preparation for her R knee surgery - he    Sick sinus syndrome 12/27/2019    Sjogren's disease     Spondylolisthesis of lumbar region 01/17/2018    Syncope, recurrent 02/08/2021    " Urinary tract infection     UTI (urinary tract infection) 04/19/2024     Past Surgical History:   Procedure Laterality Date    A-V CARDIAC PACEMAKER INSERTION  2016    ATRIAL CARDIAC PACEMAKER INSERTION      CARDIAC CATHETERIZATION      CATARACT EXTRACTION      CERVICAL CORPECTOMY N/A 3/3/2021    Procedure: CERVICAL 6 CORPECTOMY WITH TITANIUM CAGE WITH NEURO MONITORING;  Surgeon: Bandar Shea MD;  Location:  [facility] OR;  Service: Neurosurgery;  Laterality: N/A;    COLONOSCOPY  11/08/2011    One fold in the ascending colon which showed ulcer otherwise normal exam    COLONOSCOPY  11/12/2004    Normal exam repeat in five years    CORONARY ANGIOPLASTY WITH STENT PLACEMENT      X 2; 2013 & 2014    ENDOSCOPY  07/10/2014    Normal exam    ENDOSCOPY N/A 5/30/2024    Procedure: ESOPHAGOGASTRODUODENOSCOPY WITH ANESTHESIA;  Surgeon: Taiwo Sanchez MD;  Location: [facility] ENDOSCOPY;  Service: Gastroenterology;  Laterality: N/A;  preop; dysphagia  postop: balloon dilation  PCP Jesus Manuel jeff    FLAP LEG Right 9/14/2021    Procedure: RIGHT GLUTEAL FASCIOCUTANEOUS ADVANCEMENT FLAP AND RIGHT TENSOR FASCIAL JESSICA FLAP;  Surgeon: Amadeo Turner MD;  Location:  [facility] OR;  Service: Plastics;  Laterality: Right;    HIP ABDUCTION TENOTOMY BILATERAL Right 1/14/2021    Procedure: RIGHT HIP GLUTEUS MEDLUS / MINIMUS REPAIR, POSSIBLE ACHILLES ALLOGRAFT;  Surgeon: Nino Carlson MD;  Location:  [facility];  Service: Orthopedics;  Laterality: Right;    INCISION AND DRAINAGE ABSCESS Right 6/4/2022    Procedure: INCISION AND DRAINAGE ABSCESS right hip;  Surgeon: Magda Salcido MD;  Location:  [facility];  Service: General;  Laterality: Right;    INCISION AND DRAINAGE ABSCESS Right 6/10/2022    Procedure: RIGHT HIP INCISION AND DRAINAGE. MD NEEDS 3L VANC IRRIGATION, CURRETTES, DAICANS, KERLEX ROLLS;  Surgeon: Amadeo Turner MD;  Location:  [facility];  Service: Plastics;  Laterality: Right;    INCISION AND DRAINAGE HIP Right 2/9/2021     Procedure: HIP INCISION AND DRAINAGE;  Surgeon: Nino Carlson MD;  Location:  [facility];  Service: Orthopedics;  Laterality: Right;    INCISION AND DRAINAGE LEG Right 10/24/2021    Procedure: INCISION AND DRAINAGE LOWER EXTREMITY;  Surgeon: Amadeo Turner MD;  Location:  [facility];  Service: Plastics;  Laterality: Right;    INCISION AND DRAINAGE OF WOUND Right 7/8/2021    Procedure: INCISION AND DRAINAGE WOUND RIGHT HIP;  Surgeon: James Huntley MD;  Location:  [facility];  Service: Orthopedics;  Laterality: Right;    JOINT REPLACEMENT      KYPHOPLASTY WITH BIOPSY Bilateral 10/26/2021    Procedure: THOARCIC 12 KYPHOPLASTY WITH BIOPSY;  Surgeon: Bandar Shea MD;  Location:  [facility];  Service: Neurosurgery;  Laterality: Bilateral;    LEG DEBRIDEMENT Right 9/14/2021    Procedure: DEBRIDEMENT OF RIGHT HIP WOUND, RIGHT GLUTEAL FASCIOCUTANEOUS ADVANCEMENT FLAP AND RIGHT TENSOR FASCIAL JESSICA FLAP;  Surgeon: Amadeo Turner MD;  Location:  [facility];  Service: Plastics;  Laterality: Right;    LUMBAR DISCECTOMY Right 3/23/2021    Procedure: LUMBAR DISCECTOMY MICRO, Lumbar 1/2 right;  Surgeon: Bandar Shea MD;  Location:  [facility];  Service: Neurosurgery;  Laterality: Right;    LUMBAR FUSION N/A 1/19/2018    Procedure: L3-4,L4-5 DECOMPRESSION, POSTERIOR SPINAL FUSION WITH INSTRUMENTATION;  Surgeon: Fortino Oropeza MD;  Location: [facility] OR;  Service:     LUMBAR LAMINECTOMY WITH FUSION Left 1/17/2018    Procedure: LEFT L3-4 L4-5 LATERAL LUMBAR INTERBODY FUSION;  Surgeon: Fortino Oropeza MD;  Location:  [facility];  Service:     MASS EXCISION Right 4/23/2024    Procedure: RIGHT BUTTOCK MASS EXCISION;  Surgeon: Moises Keyes MD;  Location:  [facility] OR;  Service: General;  Laterality: Right;    MYRINGOTOMY W/ TUBES  09/04/2014    TUBES NO LONGER IN PLACE    OTHER SURGICAL HISTORY      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 so hardware was removed and spacers were placed    REPLACEMENT TOTAL KNEE Right        General Information       Row Name 06/18/24 [facility]0          Physical Therapy Time and Intention    Document Type evaluation  AMS, UTI, open wound R buttock, RA  -MS     Mode of Treatment physical therapy;individual therapy  -MS       Row Name 06/18/24 [facility]0          General Information    Patient Profile Reviewed yes  -MS     Prior Level of Function independent:;feeding;grooming;mod assist:;max assist:;dressing;bathing;min assist:;transfer  -MS     Existing Precautions/Restrictions fall  -MS     Barriers to Rehab medically complex;previous functional deficit;physical barrier  -MS       Row Name 06/18/24 [facility]0          Living Environment    People in Home other (see comments)  24/7 caregiver  -MS       Row Name 06/18/24 [facility]          Home Main Entrance    Number of Stairs, Main Entrance none  -MS       Row Name 06/18/24 [facility]          Stairs Within Home, Primary    Number of Stairs, Within Home, Primary none  -MS       Row Name 06/18/24 [facility]0          Cognition    Orientation Status (Cognition) oriented x 4  -MS       Row Name 06/18/24 [facility]0          Safety Issues, Functional Mobility    Impairments Affecting Function (Mobility) balance;endurance/activity tolerance;pain;strength;range of motion (ROM)  -MS               User Key  (r) = Recorded By, (t) = Taken By, (c) = Cosigned By      Initials Name Provider Type    MS Nicole Olson R, PT, DPT, NCS Physical Therapist                   Mobility       Row Name 06/18/24 [facility]0          Bed Mobility    Bed Mobility supine-sit  -MS     Scooting/Bridging [place] (Bed Mobility) contact guard;verbal cues  -MS     Assistive Device (Bed Mobility) bed rails;head of bed elevated  -MS       Row Name 06/18/24 1350          Bed-Chair Transfer    Bed-Chair [place] (Transfers) moderate assist (50% patient effort);verbal cues;nonverbal cues (demo/gesture)  -MS       Row Name 06/18/24 1350          Sit-Stand Transfer    Sit-Stand [place] (Transfers) maximum assist (25%  patient effort);verbal cues;nonverbal cues (demo/gesture)  -MS     Assistive Device (Sit-Stand Transfers) walker, front-wheeled  -MS               User Key  (r) = Recorded By, (t) = Taken By, (c) = Cosigned By      Initials Name Provider Type    MS Olson Nicole MCGEE, PT, DPT, NCS Physical Therapist                   Obj/Interventions       Row Name 06/18/24 1350          Range of Motion Comprehensive    Comment, General Range of Motion R LE impaired 25%  -MS       Row Name 06/18/24 1350          Strength Comprehensive (MMT)    Comment, General Manual Muscle Testing (MMT) Assessment R LE grossly 3-/5, L LE grossly 3+/5  -MS       Row Name 06/18/24 1350          Balance    Balance Assessment sitting static balance;sitting dynamic balance;standing static balance;standing dynamic balance  -MS     Static Sitting Balance standby assist  -MS     Dynamic Sitting Balance standby assist  -MS     Position, Sitting Balance supported;sitting edge of bed  -MS     Dynamic Standing Balance maximum assist  -MS     Position/Device Used, Standing Balance supported;walker, rolling  -MS               User Key  (r) = Recorded By, (t) = Taken By, (c) = Cosigned By      Initials Name Provider Type    MS Olson Nicole R, PT, DPT, NCS Physical Therapist                   Goals/Plan       Row Name 06/18/24 1350          Bed Mobility Goal 1 (PT)    Activity/Assistive Device (Bed Mobility Goal 1, PT) bed mobility activities, all  -MS     [place] Level/Cues Needed (Bed Mobility Goal 1, PT) modified independence  -MS     Time Frame (Bed Mobility Goal 1, PT) long term goal (LTG);by discharge  -MS     Progress/Outcomes (Bed Mobility Goal 1, PT) new goal  -MS       Row Name 06/18/24 1350          Transfer Goal 1 (PT)    Activity/Assistive Device (Transfer Goal 1, PT) sit-to-stand/stand-to-sit;bed-to-chair/chair-to-bed;walker, rolling  -MS     [place] Level/Cues Needed (Transfer Goal 1, PT) minimum assist (75% or more patient effort)  -MS      Time Frame (Transfer Goal 1, PT) long term goal (LTG);by discharge  -MS     Progress/Outcome (Transfer Goal 1, PT) new goal  -MS       Row Name 06/18/24 1350          Gait Training Goal 1 (PT)    Activity/Assistive Device (Gait Training Goal 1, PT) gait (walking locomotion);assistive device use;decrease fall risk;increase endurance/gait distance;improve balance and speed;walker, rolling  -MS     [place] Level (Gait Training Goal 1, PT) minimum assist (75% or more patient effort)  -MS     Distance (Gait Training Goal 1, PT) 10 ft  -MS     Time Frame (Gait Training Goal 1, PT) long term goal (LTG);by discharge  -MS     Progress/Outcome (Gait Training Goal 1, PT) new goal  -MS       Row Name 06/18/24 1350          Therapy Assessment/Plan (PT)    Planned Therapy Interventions (PT) balance training;bed mobility training;gait training;patient/family education;strengthening;transfer training  -MS               User Key  (r) = Recorded By, (t) = Taken By, (c) = Cosigned By      Initials Name Provider Type    MS Nicole Olson, PT, DPT, NCS Physical Therapist                   Clinical Impression       Row Name 06/18/24 1350          Pain    Pretreatment Pain Rating 3/10  -MS     Posttreatment Pain Rating 3/10  -MS     Pain Location - Side/Orientation Bilateral  -MS     Pain Location - hip  -MS     Pain Intervention(s) Repositioned;Ambulation/increased activity  -MS       Row Name 06/18/24 1353          Plan of Care Review    Plan of Care Reviewed With patient  -MS     Progress no change  -MS     Outcome Evaluation The patient presents alert and oriented x4 lying in bed with R lower leg red and swollen and warm to the touch. She demonstrates generalized weakness with her R LE weaker than the L which is baseline for her. She was able to sit EOB wiht CGA, but required mod A to transfer to the chair. She has declined in her ability over the past few months. She was ambulating with a walker a few months ago, but now  can only tranfer with significant assist. She will benefit from continued PT to work on strengthening and transfer training. Recommend discharge home with 24/7 care and home health.  -MS       Row Name 06/18/24 1350          Therapy Assessment/Plan (PT)    Patient/Family Therapy Goals Statement (PT) go home  -MS     Rehab Potential (PT) good, to achieve stated therapy goals  -MS     Criteria for Skilled Interventions Met (PT) yes;meets criteria;skilled treatment is necessary  -MS     Therapy Frequency (PT) 2 times/day  -MS     Predicted Duration of Therapy Intervention (PT) until discharge  -MS       Row Name 06/18/24 1350          Positioning and Restraints    Post Treatment Position chair  -MS     In Chair reclined;call light within reach;encouraged to call for assist;with family/caregiver;legs elevated  -MS               User Key  (r) = Recorded By, (t) = Taken By, (c) = Cosigned By      Initials Name Provider Type    MS Olson Nicole R, PT, DPT, NCS Physical Therapist                   Outcome Measures       Row Name 06/18/24 1350 06/18/24 0841       How much help from another person do you currently need...    Turning from your back to your side while in flat bed without using bedrails? 3  -MS 3  -MSA    Moving from lying on back to sitting on the side of a flat bed without bedrails? 3  -MS 2  -MSA    Moving to and from a bed to a chair (including a wheelchair)? 2  -MS 1  -MSA    Standing up from a chair using your arms (e.g., wheelchair, bedside chair)? 2  -MS 1  -MSA    Climbing 3-5 steps with a railing? 1  -MS 1  -MSA    To walk in hospital room? 1  -MS 1  -MSA    AM-PAC 6 Clicks Score (PT) 12  -MS 9  -MSA    Highest Level of Mobility Goal 4 --> Transfer to chair/commode  -MS 3 --> Sit at edge of bed  -MSA      Row Name 06/18/24 0440          How much help from another person do you currently need...    Turning from your back to your side while in flat bed without using bedrails? 3  -AM     Moving from  lying on back to sitting on the side of a flat bed without bedrails? 2  -AM     Moving to and from a bed to a chair (including a wheelchair)? 1  -AM     Standing up from a chair using your arms (e.g., wheelchair, bedside chair)? 1  -AM     Climbing 3-5 steps with a railing? 1  -AM     To walk in hospital room? 1  -AM     AM-PAC 6 Clicks Score (PT) 9  -AM     Highest Level of Mobility Goal 3 --> Sit at edge of bed  -AM       Row Name 06/18/24 1350          Functional Assessment    Outcome Measure Options AM-PAC 6 Clicks Basic Mobility (PT)  -MS               User Key  (r) = Recorded By, (t) = Taken By, (c) = Cosigned By      Initials Name Provider Type    MS Nicole Olson, PT, DPT, NCS Physical Therapist    Leila Hall, RN Registered Nurse    Leeann Cyr RN Registered Nurse                                 Physical Therapy Education       Title: PT OT SLP Therapies (In Progress)       Topic: Physical Therapy (Done)       Point: Mobility training (Done)       Learning Progress Summary             Patient Acceptance, E, VU by MS at 6/18/2024 1502    Comment: role of PT in her care                         Point: Home exercise program (Done)       Learning Progress Summary             Patient Acceptance, E,D,TB, VU,DU,NR by  at 6/15/2024 1514    Acceptance, E,TB,D, VU,DU,NR by  at 6/14/2024 1550                         Point: Body mechanics (Done)       Learning Progress Summary             Patient Acceptance, E,D,TB, VU,DU,NR by  at 6/15/2024 1514    Acceptance, E,TB,D, VU,DU,NR by  at 6/14/2024 1550                         Point: Precautions (Done)       Learning Progress Summary             Patient Acceptance, E,D,TB, VU,DU,NR by  at 6/15/2024 1514    Acceptance, E,TB,D, VU,DU,NR by  at 6/14/2024 1550                                         User Key       Initials Effective Dates Name Provider Type Discipline    MS 07/11/23 -  Nicole Olson, PT, DPT, NCS Physical Therapist PT      10/17/23 -  Stephanie Doll, RN Registered Nurse Nurse                  PT Recommendation and Plan  Planned Therapy Interventions (PT): balance training, bed mobility training, gait training, patient/family education, strengthening, transfer training  Plan of Care Reviewed With: patient  Progress: no change  Outcome Evaluation: The patient presents alert and oriented x4 lying in bed with R lower leg red and swollen and warm to the touch. She demonstrates generalized weakness with her R LE weaker than the L which is baseline for her. She was able to sit EOB wiht CGA, but required mod A to transfer to the chair. She has declined in her ability over the past few months. She was ambulating with a walker a few months ago, but now can only tranfer with significant assist. She will benefit from continued PT to work on strengthening and transfer training. Recommend discharge home with / care and home health.     Time Calculation:         PT Charges       Row Name 06/18/24 1350             Time Calculation    Start Time 1350  -MS      Stop Time 1430  -MS      Time Calculation (min) 40 min  -MS      PT Received On 06/18/24  -MS      PT Goal Re-Cert Due Date 06/28/24  -MS         Untimed Charges    PT Eval/Re-eval Minutes 40  -MS         Total Minutes    Untimed Charges Total Minutes 40  -MS       Total Minutes 40  -MS                User Key  (r) = Recorded By, (t) = Taken By, (c) = Cosigned By      Initials Name Provider Type    Nicole Palacios, PT, DPT, NCS Physical Therapist                      PT G-Codes  Outcome Measure Options: AM-PAC 6 Clicks Basic Mobility (PT)  AM-PAC 6 Clicks Score (PT): 12  AM-PAC 6 Clicks Score (OT): 15  PT Discharge Summary  Anticipated Discharge Disposition (PT): home with [facility] care, home with home health    Nicole Olson, PT, DPT, NCS  6/18/2024

## 2024-06-18 NOTE — PROGRESS NOTES
[facility] Medicine Services  INPATIENT PROGRESS NOTE    Patient Name: Tawny Shin  Date of Admission: 6/12/2024  Today's Date: 06/18/24  Length of Stay: 0  Primary Care Physician: Moises Oliveira MD    Subjective   Chief Complaint: Recurrent UTIs.   HPI   Mental status back to baseline.  Blood pressure stable, afebrile.  Cefepime was started due to sensitivity yesterday.  ID was consulted, length of antibiotics and type of antibiotics dependent ID discussed with patient.  Creatinine stable.  Hemoglobin increased.  White blood cells normal.    Review of Systems   Constitutional:  Positive for activity change, appetite change and fatigue. Negative for chills and fever.   HENT:  Negative for hearing loss, nosebleeds, tinnitus and trouble swallowing.    Eyes:  Negative for visual disturbance.   Respiratory:  Negative for cough, chest tightness, shortness of breath and wheezing.    Cardiovascular:  Negative for chest pain, palpitations and leg swelling.   Gastrointestinal:  Negative for abdominal distention, abdominal pain, blood in stool, constipation, diarrhea, nausea and vomiting.   Endocrine: Negative for cold intolerance, heat intolerance, polydipsia, polyphagia and polyuria.   Genitourinary:  Negative for decreased urine volume, difficulty urinating, dysuria, flank pain, frequency and hematuria.   Musculoskeletal:  Positive for arthralgias, gait problem and myalgias. Negative for joint swelling.   Skin:  Negative for rash.   Allergic/Immunologic: Negative for immunocompromised state.   Neurological:  Positive for weakness. Negative for dizziness, syncope, light-headedness and headaches.   Hematological:  Negative for adenopathy. Does not bruise/bleed easily.   Psychiatric/Behavioral:  Negative for confusion and sleep disturbance. The patient is not nervous/anxious.       All pertinent negatives and positives are as above. All other systems have been reviewed and are  negative unless otherwise stated.     Objective    Temp:  [98.3 °F (36.8 °C)-98.8 °F (37.1 °C)] 98.3 °F (36.8 °C)  Heart Rate:  [77-94] 91  Resp:  [18] 18  BP: (109-147)/(49-68) 128/50  Physical Exam  Vitals and nursing note reviewed.   Constitutional:       Comments: Chronically ill.  Deconditioning.   HENT:      Head: Normocephalic.   Eyes:      Conjunctiva/sclera: Conjunctivae normal.      Pupils: Pupils are equal, round, and reactive to light.   Cardiovascular:      Rate and Rhythm: Normal rate and regular rhythm.      Heart sounds: Normal heart sounds.   Pulmonary:      Effort: Pulmonary effort is normal. No respiratory distress.      Breath sounds: Normal breath sounds.   Abdominal:      General: Bowel sounds are normal. There is no distension.      Palpations: Abdomen is soft.      Tenderness: There is no abdominal tenderness.      Comments: Obesity.   Musculoskeletal:         General: No swelling.      Cervical back: Neck supple.   Skin:     General: Skin is warm and dry.      Capillary Refill: Capillary refill takes 2 to 3 seconds.      Findings: No rash.   Neurological:      Mental Status: She is alert and oriented to person, place, and time.      Motor: Weakness present.      Coordination: Coordination abnormal.      Gait: Gait abnormal.   Psychiatric:         Mood and Affect: Mood normal.         Behavior: Behavior normal.         Thought Content: Thought content normal.          Results Review:  I have reviewed the labs, radiology results, and diagnostic studies.    Laboratory Data:   Results from last 7 days   Lab Units 06/18/24  0418 06/12/24  2112   WBC 10*3/mm3 6.75 8.62   HEMOGLOBIN g/dL 9.7* 8.9*   HEMATOCRIT % 32.4* 29.3*   PLATELETS 10*3/mm3 174 164        Results from last 7 days   Lab Units 06/18/24  0418 06/13/24  0441 06/12/24  2112   SODIUM mmol/L 136 138 138   POTASSIUM mmol/L 4.1 4.0 3.7   CHLORIDE mmol/L 100 102 100   CO2 mmol/L 27.0 25.0 26.0   BUN mg/dL 23 18 19   CREATININE mg/dL  1.08* 1.09* 1.13*   CALCIUM mg/dL 8.4* 8.1* 8.5*   BILIRUBIN mg/dL  --   --  0.5   ALK PHOS U/L  --   --  77   ALT (SGPT) U/L  --   --  23   AST (SGOT) U/L  --   --  27   GLUCOSE mg/dL 122* 155* 132*       Culture Data:   Blood Culture   Date Value Ref Range Status   06/12/2024 No growth at 5 days  Final   06/12/2024 No growth at 5 days  Final     Urine Culture   Date Value Ref Range Status   06/16/2024 25,000 CFU/mL Enterobacter cloacae complex CRE (A)  Preliminary     Comment:     Ertapenem to follow.  Consider infectious disease consult.  Susceptibility results may not correlate to clinical outcomes.  Carbapenem resistant Enterobacteriaceae, patient may be an isolation risk.   06/12/2024 >100,000 CFU/mL Enterobacter cloacae complex (A)  Final     Comment:       This organism may develop resistance during prolonged therapy with 3rd generation cephalosporins (e.g. ceftriaxone) as a result of de-repression of AmpC B-lactamase.  Ceftriaxone may be a reasonable treatment option for uncomplicated cystitis or other lower severity infections when susceptibility is demonstrated.   06/12/2024 50,000 CFU/mL Mixed Selina Isolated  Final       Radiology Data:   Imaging Results (Last 24 Hours)       ** No results found for the last 24 hours. **            I have reviewed the patient's current medications.     Assessment/Plan   Assessment  Active Hospital Problems    Diagnosis     **Altered mental status     Esophageal dysphagia     UTI (urinary tract infection)     Hypothyroidism     Stage 3b chronic kidney disease     Essential hypertension     Sick sinus syndrome     Venous insufficiency     Rheumatoid arthritis involving multiple sites     Chronic heart failure with preserved ejection fraction     Chronic anticoagulation     Chronic embolism and thrombosis of unspecified deep veins of left lower extremity        Treatment Plan  Altered mental status.  Resolved.  CT scan the head-No acute intracranial abnormality, Chronic  sinusitis and left mastoid effusion.  Chest x-ray-Stable chest exam without acute process, No visualized infiltrate.   Patient is on room air.     UTI.  Cefepime antibiotics.  Continue Hip-Stef.  Repeat UA, negative for bacteria.  Consult ID     Reflux/esophageal dysphagia. Dysphagia recent esophageal stretching, GI evaluated and recommended to continue outpatient follow-up.  SLP evaluated and recommended to continue regular diet.  Protonix . Zofran as needed.     Hypothyroidism.  Synthroid.     Chronic stage IIIb renal failure.  Creatinine is improving.     Hypomagnesia. Resolved.     Hypertension/sick sinus syndrome/venous insufficiency/CHF.  Eliquis.  Aspirin . Coreg . Lasix.  Imdur.  Cozaar . Aldactone.  Nitro as needed.     Rheumatoid arthritis. Arava.     Depression/anxiety.  Cymbalta.     Postmenopausal.  Estradiol cream.     Anemia.  Iron sulfate.  Folic acid.     Pain . lidocaine patch.  Voltaren gel.  Ultram as needed.     Chronic DVT.     Nutrition.  Cardiac diet.  Vitamin C.  Multivitamins.  Regular/house diet.  Boost supplement.     Deconditioning.  PT and OT consult.  Patient use a walker and bedbound at baseline.     Urine culture-100,000 CFU/mL Enterobacter cloacae complex  25,000 CFU/mL Enterobacter cloacae complex CRE   Blood culture-no growth for 5 days.     Dr. Shin's mom.  Patient already have home health at home.  Patient request for lift at discharge.     Medical Decision Making  Number and Complexity of problems: Recurrent UTI/altered mental status/reflux/rheumatoid arthritis/chronic 3B renal failure  Differential Diagnosis: None     Conditions and Status        Condition is unchanged.     [facility] Data  External documents reviewed: Previous note .  Cardiac tracing (EKG, telemetry) interpretation: Sinus.  Radiology interpretation: X-ray/CT scan of the head  Labs reviewed: Laboratory  Any tests that were considered but not ordered: Lab in a.m.     Decision rules/scores evaluated (example  [id number], Wells, etc): None     Discussed with: Patient and family     Care Planning  Shared decision making: Patient and family  Code status and discussions: DNR     Disposition  Social Determinants of Health that impact treatment or disposition: From home  1 to 3 days.       Electronically signed by Saulo Ambriz MD, 06/18/24, 13:56 CDT.

## 2024-06-18 NOTE — PLAN OF CARE
Goal Outcome Evaluation:  Plan of Care Reviewed With: patient        Progress: no change  Outcome Evaluation: The patient presents alert and oriented x4 lying in bed with R lower leg red and swollen and warm to the touch. She demonstrates generalized weakness with her R LE weaker than the L which is baseline for her. She was able to sit EOB wiht CGA, but required mod A to transfer to the chair. She has declined in her ability over the past few months. She was ambulating with a walker a few months ago, but now can only tranfer with significant assist. She will benefit from continued PT to work on strengthening and transfer training. Recommend discharge home with 24/7 care and home health.      Anticipated Discharge Disposition (PT): home with 24/7 care, home with home health

## 2024-06-18 NOTE — PLAN OF CARE
Problem: Adult Inpatient Plan of Care  Goal: Plan of Care Review  Outcome: Ongoing, Progressing  Flowsheets (Taken 6/18/2024 0449)  Progress: no change  Plan of Care Reviewed With: patient  Outcome Evaluation: Pt A&Ox4, VSS. C/o pain to R knee with good relief from PRN PO med. Turned q 2 hours. Voiding, external catheter in place. Room air. Safety maintained, will continue to monitor.

## 2024-06-18 NOTE — CONSULTS
"INFECTIOUS DISEASES CONSULT NOTE    Patient:  Tawny Shin 68 y.o. female  ROOM # 338/1  YOB: 1955  MRN: [id number]  [facility]:  [id number]  Admit date: 6/12/2024   Admitting Physician: Saulo Ambriz MD  Primary Care Physician: Moises Oliveira MD  REFERRING PROVIDER: Wes Andrews MD    Reason for Consultation: \"Recurrent UTI\"    History of Present Illness/Chief Complaint: Very pleasant 68-year-old woman.  She is known to my partner and I from previous consultation.  When asked what symptoms brought her to the hospital she indicated, \"keep getting stuff.\"  She has been at home.  Apparently she had decreased responsiveness at home.  They had contacted her son who came to evaluate her.  Based on her decreased level of alertness and responsiveness ambulance was called.  She was brought to the hospital for further evaluation.  Mental status seems to be closer to baseline at present.  She has not had fever documented while in the hospital.  Her heart rate and blood pressure have been stable.  She is receiving antibiotic treatment cefepime.  She has had recent admissions for UTI.  Infectious disease asked to evaluate and offer recommendations.    Current Scheduled Medications:   apixaban, 5 mg, Oral, BID  ascorbic acid, 500 mg, Oral, Daily  aspirin, 81 mg, Oral, Daily  carvedilol, 25 mg, Oral, BID With Meals  [START ON 6/18/2024] cefepime, 2,000 mg, Intravenous, Q12H  DULoxetine, 60 mg, Oral, Daily  estradiol, 2 g, Vaginal, Daily  ferrous sulfate, 325 mg, Oral, Daily With Breakfast  folic acid, 1,000 mcg, Oral, Daily  furosemide, 40 mg, Oral, Daily  isosorbide mononitrate, 60 mg, Oral, Daily  leflunomide, 20 mg, Oral, Daily  levothyroxine, 75 mcg, Oral, Q AM  losartan, 50 mg, Oral, Daily  methenamine, 1 g, Oral, BID  multivitamin with minerals, 1 tablet, Oral, Daily  pantoprazole, 40 mg, Oral, Daily  sodium chloride, 10 mL, Intravenous, Q12H  spironolactone, 25 mg, Oral, Daily    Current PRN " "Medications:    acetaminophen    senna-docusate sodium **AND** polyethylene glycol **AND** bisacodyl **AND** bisacodyl    Calcium Replacement - Follow Nurse / BPA Driven Protocol    Diclofenac Sodium    Lidocaine    Magnesium Low Dose Replacement - Follow Nurse / BPA Driven Protocol    [DISCONTINUED] Morphine **AND** naloxone    nitroglycerin    ondansetron    Phosphorus Replacement - Follow Nurse / BPA Driven Protocol    Potassium Replacement - Follow Nurse / BPA Driven Protocol    sodium chloride    sodium chloride    sodium chloride    traMADol    Allergies:    Allergies   Allergen Reactions    Atorvastatin Other (See Comments)     LEG CRAMPS      Amoxicillin Rash    Escitalopram Rash    Nabumetone Rash    Niacin Er Rash    Penicillin G Rash    Penicillins Rash    Simvastatin Rash     Past Medical History: E. coli bacteremia April 2024.  Enterobacter bacteremia May 2024.  Remote history of MSSA bacteremia in February 2021.  History MSSA infection involving right hip.  Prior history of right psoas abscess-MSSA.  Cervical stenosis.  L1/L2 discitis/osteomyelitis.    Past Surgical History: Coronary artery disease.  Coronary artery stenting.  Cervical corpectomy.  Cataract extraction.  Pacemaker insertion.  Multiple prior right hip surgeries.  Lumbar discectomy.  Lumbar fusion.  Lumbar laminectomy.  Right buttock mass excision.  Total knee arthroplasty.  Myringotomy tube placement.    Social History: .  Lives at home.  Has caregiver and family support.    Family History: Lung cancer.  Heart disease.    Exposure History: No close contacts have been ill    Review of Systems she has some dyspnea at times.  No productive cough.  No sputum production.  No abdominal pain.  No nausea or diarrhea.    Vital Signs:  /53 (BP Location: Right arm, Patient Position: Lying)   Pulse 81   Temp 98.3 °F (36.8 °C) (Oral)   Resp 18   Ht 167.6 cm (66\")   Wt 94.8 kg (209 lb)   LMP  (LMP Unknown)   SpO2 93%   BMI 33.73 " kg/m²  Temp (24hrs), Av.4 °F (36.9 °C), Min:98.3 °F (36.8 °C), Max:98.5 °F (36.9 °C)    Physical Exam  Vital signs - reviewed.  Line/IV site - No erythema or tenderness.  Heart distant heart sounds  Lungs without crackles  Abdomen is soft and nontender  No suprapubic or flank tenderness  Lateral right hip incision without drainage  There is a wound right buttock area has some fibrinous debris at the surface.  I did not palpate any surrounding fluctuance or erythema.  Picture from earlier in hospital stay outlined below.      Lab Results:  CBC:   Results from last 7 days   Lab Units 24  2112   WBC 10*3/mm3 8.62   HEMOGLOBIN g/dL 8.9*   HEMATOCRIT % 29.3*   PLATELETS 10*3/mm3 164     CMP:   Results from last 7 days   Lab Units 24  0441 24  2112   SODIUM mmol/L 138 138   POTASSIUM mmol/L 4.0 3.7   CHLORIDE mmol/L 102 100   CO2 mmol/L 25.0 26.0   BUN mg/dL 18 19   CREATININE mg/dL 1.09* 1.13*   CALCIUM mg/dL 8.1* 8.5*   BILIRUBIN mg/dL  --  0.5   ALK PHOS U/L  --  77   ALT (SGPT) U/L  --  23   AST (SGOT) U/L  --  27   GLUCOSE mg/dL 155* 132*     Urinalysis 2024:  3-5 red blood cells per high-power field  Too numerous to count white blood cells per high-power field    Urinalysis 2024:  3-5 red blood cells per high-power field  Too numerous to count white blood cells per high-power field    Culture results:  Blood cultures 2024-no growth  Blood cultures 2024-no growth  Blood cultures May 24, 2024-Proteus mirabilis  Blood cultures May 2, 2024-Enterobacter cloacae (CRE)  Blood cultures 2024-E. coli    Urine culture May 4, 2024:  Proteus mirabilis  Urine culture 2024: 25,000 coliform units gram-negative bacilli    Radiology:   Right upper quadrant ultrasound May 4, 2024:  IMPRESSION:  1. Gallstone with no sign of cholecystitis.    Additional Studies Reviewed:     Transthoracic echocardiogram done 2024:    Left ventricular systolic function  is normal. Left ventricular ejection fraction appears to be 61 - 65%.    Left ventricular wall thickness is consistent with mild concentric hypertrophy.    The left atrial cavity is mildly dilated.    Normal size and function of the right ventricle.    No significant valvular pathology.    Compared to prior exam from 4/24/2022, no significant change.    Impression:   1.  Altered mental status-seems to be improving.  Possible UTI.  May be somewhat multifactorial.  Would like to review history further with family.  2.  Probable UTI  3.  Chronic kidney disease  4.  Open wound right buttock area without purulence or surrounding erythema  5.  Rheumatoid arthritis    Recommendations:    Seems to be improving  Continue cefepime  She has had multiple recent gram-negative bacteremia's-each with different organisms.  The recovery of different pathogens evidence against a persistent nidus of infection.  Urinary source would seem to be the most likely source for her recurrent bloodstream infections.  Going to explore for any additional recommendations to help prevent recurrences.  Continue cefepime at present  Await ID and susceptibility from pending urine culture  Will continue to follow.    Elie Ohara MD  06/17/24  20:44 CDT

## 2024-06-18 NOTE — PLAN OF CARE
Goal Outcome Evaluation:      Pt is A&Ox4 and afebrile. Zofran was given for Nausea and vomitinig. Meds given whole in applesauce. Turn q2 hrs. Pt did spen some time in the chair this afternoon. VSS.

## 2024-06-19 LAB
ANION GAP SERPL CALCULATED.3IONS-SCNC: 8 MMOL/L (ref 5–15)
BACTERIA SPEC AEROBE CULT: ABNORMAL
BUN SERPL-MCNC: 23 MG/DL (ref 8–23)
BUN/CREAT SERPL: 19.8 (ref 7–25)
CALCIUM SPEC-SCNC: 8.3 MG/DL (ref 8.6–10.5)
CHLORIDE SERPL-SCNC: 101 MMOL/L (ref 98–107)
CO2 SERPL-SCNC: 26 MMOL/L (ref 22–29)
CREAT SERPL-MCNC: 1.16 MG/DL (ref 0.57–1)
DEPRECATED RDW RBC AUTO: 62.8 FL (ref 37–54)
EGFRCR SERPLBLD CKD-EPI 2021: 50.8 ML/MIN/1.73
ERYTHROCYTE [DISTWIDTH] IN BLOOD BY AUTOMATED COUNT: 18.6 % (ref 12.3–15.4)
GLUCOSE SERPL-MCNC: 120 MG/DL (ref 65–99)
HCT VFR BLD AUTO: 25.3 % (ref 34–46.6)
HGB BLD-MCNC: 7.4 G/DL (ref 12–15.9)
MCH RBC QN AUTO: 27.6 PG (ref 26.6–33)
MCHC RBC AUTO-ENTMCNC: 29.2 G/DL (ref 31.5–35.7)
MCV RBC AUTO: 94.4 FL (ref 79–97)
PLATELET # BLD AUTO: 168 10*3/MM3 (ref 140–450)
PMV BLD AUTO: 11.1 FL (ref 6–12)
POTASSIUM SERPL-SCNC: 3.7 MMOL/L (ref 3.5–5.2)
RBC # BLD AUTO: 2.68 10*6/MM3 (ref 3.77–5.28)
SODIUM SERPL-SCNC: 135 MMOL/L (ref 136–145)
WBC NRBC COR # BLD AUTO: 6.69 10*3/MM3 (ref 3.4–10.8)

## 2024-06-19 PROCEDURE — 97535 SELF CARE MNGMENT TRAINING: CPT

## 2024-06-19 PROCEDURE — 97530 THERAPEUTIC ACTIVITIES: CPT

## 2024-06-19 PROCEDURE — 99214 OFFICE O/P EST MOD 30 MIN: CPT | Performed by: INTERNAL MEDICINE

## 2024-06-19 PROCEDURE — G0378 HOSPITAL OBSERVATION PER HR: HCPCS

## 2024-06-19 PROCEDURE — 85027 COMPLETE CBC AUTOMATED: CPT | Performed by: FAMILY MEDICINE

## 2024-06-19 PROCEDURE — 80048 BASIC METABOLIC PNL TOTAL CA: CPT | Performed by: FAMILY MEDICINE

## 2024-06-19 PROCEDURE — 25010000002 CEFEPIME PER 500 MG: Performed by: FAMILY MEDICINE

## 2024-06-19 PROCEDURE — 97110 THERAPEUTIC EXERCISES: CPT

## 2024-06-19 RX ADMIN — OXYCODONE HYDROCHLORIDE AND ACETAMINOPHEN 500 MG: 500 TABLET ORAL at 08:53

## 2024-06-19 RX ADMIN — Medication 10 ML: at 08:54

## 2024-06-19 RX ADMIN — Medication 1 TABLET: at 08:52

## 2024-06-19 RX ADMIN — CARVEDILOL 25 MG: 25 TABLET, FILM COATED ORAL at 17:04

## 2024-06-19 RX ADMIN — FOLIC ACID 1000 MCG: 1 TABLET ORAL at 08:52

## 2024-06-19 RX ADMIN — ASPIRIN 81 MG: 81 TABLET, COATED ORAL at 08:52

## 2024-06-19 RX ADMIN — LEFLUNOMIDE 20 MG: 20 TABLET ORAL at 08:54

## 2024-06-19 RX ADMIN — ISOSORBIDE MONONITRATE 60 MG: 60 TABLET, EXTENDED RELEASE ORAL at 08:54

## 2024-06-19 RX ADMIN — TRAMADOL HYDROCHLORIDE 50 MG: 50 TABLET ORAL at 21:15

## 2024-06-19 RX ADMIN — CEFEPIME 2000 MG: 2 INJECTION, POWDER, FOR SOLUTION INTRAVENOUS at 23:56

## 2024-06-19 RX ADMIN — CEFEPIME 2000 MG: 2 INJECTION, POWDER, FOR SOLUTION INTRAVENOUS at 11:55

## 2024-06-19 RX ADMIN — FUROSEMIDE 40 MG: 40 TABLET ORAL at 08:53

## 2024-06-19 RX ADMIN — Medication 10 ML: at 21:16

## 2024-06-19 RX ADMIN — LOSARTAN POTASSIUM 50 MG: 50 TABLET, FILM COATED ORAL at 08:53

## 2024-06-19 RX ADMIN — METHENAMINE HIPPURATE 1 G: 1000 TABLET ORAL at 08:51

## 2024-06-19 RX ADMIN — METHENAMINE HIPPURATE 1 G: 1000 TABLET ORAL at 21:16

## 2024-06-19 RX ADMIN — SPIRONOLACTONE 25 MG: 25 TABLET ORAL at 08:54

## 2024-06-19 RX ADMIN — DULOXETINE HYDROCHLORIDE 60 MG: 30 CAPSULE, DELAYED RELEASE ORAL at 08:53

## 2024-06-19 RX ADMIN — ACETAMINOPHEN 650 MG: 325 TABLET, FILM COATED ORAL at 14:03

## 2024-06-19 RX ADMIN — PANTOPRAZOLE SODIUM 40 MG: 40 TABLET, DELAYED RELEASE ORAL at 08:54

## 2024-06-19 RX ADMIN — APIXABAN 5 MG: 5 TABLET, FILM COATED ORAL at 21:15

## 2024-06-19 RX ADMIN — APIXABAN 5 MG: 5 TABLET, FILM COATED ORAL at 08:54

## 2024-06-19 RX ADMIN — FERROUS SULFATE TAB 325 MG (65 MG ELEMENTAL FE) 325 MG: 325 (65 FE) TAB at 08:52

## 2024-06-19 RX ADMIN — CARVEDILOL 25 MG: 25 TABLET, FILM COATED ORAL at 08:53

## 2024-06-19 RX ADMIN — LEVOTHYROXINE SODIUM 75 MCG: 0.07 TABLET ORAL at 06:01

## 2024-06-19 NOTE — PROGRESS NOTES
Infectious Diseases Progress Note    Patient:  Tawny Shin  YOB: 1955  MRN: [id number]   Admit date: 6/12/2024   Admitting Physician: Saulo Ambriz MD  Primary Care Physician: Moises Oliveira MD    Chief Complaint/Interval History: She is comfortable.  She is without new symptoms.  She has no fever.  Her mental status seems to be back to baseline.  She has caregiver at bedside.  She is tolerating cefepime without side effect.  Discussed with her son  Gee Shin.    Intake/Output Summary (Last 24 hours) at 6/18/2024 2012  Last data filed at 6/18/2024 1009  Gross per 24 hour   Intake 220 ml   Output 1000 ml   Net -780 ml     Allergies:   Allergies   Allergen Reactions    Atorvastatin Other (See Comments)     LEG CRAMPS      Amoxicillin Rash    Escitalopram Rash    Nabumetone Rash    Niacin Er Rash    Penicillin G Rash    Penicillins Rash    Simvastatin Rash     Current Scheduled Medications:   apixaban, 5 mg, Oral, BID  ascorbic acid, 500 mg, Oral, Daily  aspirin, 81 mg, Oral, Daily  carvedilol, 25 mg, Oral, BID With Meals  cefepime, 2,000 mg, Intravenous, Q12H  DULoxetine, 60 mg, Oral, Daily  estradiol, 2 g, Vaginal, Daily  ferrous sulfate, 325 mg, Oral, Daily With Breakfast  folic acid, 1,000 mcg, Oral, Daily  furosemide, 40 mg, Oral, Daily  isosorbide mononitrate, 60 mg, Oral, Daily  leflunomide, 20 mg, Oral, Daily  levothyroxine, 75 mcg, Oral, Q AM  losartan, 50 mg, Oral, Daily  methenamine, 1 g, Oral, BID  multivitamin with minerals, 1 tablet, Oral, Daily  pantoprazole, 40 mg, Oral, Daily  sodium chloride, 10 mL, Intravenous, Q12H  spironolactone, 25 mg, Oral, Daily          Current PRN Medications:    acetaminophen    senna-docusate sodium **AND** polyethylene glycol **AND** bisacodyl **AND** bisacodyl    Calcium Replacement - Follow Nurse / BPA Driven Protocol    Diclofenac Sodium    Lidocaine    Magnesium Low Dose Replacement - Follow Nurse / BPA Driven Protocol     "[DISCONTINUED] Morphine **AND** naloxone    nitroglycerin    ondansetron    Phosphorus Replacement - Follow Nurse / BPA Driven Protocol    Potassium Replacement - Follow Nurse / BPA Driven Protocol    sodium chloride    sodium chloride    sodium chloride    traMADol    Review of Systems see HPI    Vital Signs:  Temp (24hrs), Av.5 °F (36.9 °C), Min:98.2 °F (36.8 °C), Max:98.8 °F (37.1 °C)    /44 (BP Location: Right arm, Patient Position: Lying)   Pulse 72   Temp 98.2 °F (36.8 °C) (Oral)   Resp 18   Ht 167.6 cm (66\")   Wt 94.8 kg (209 lb)   LMP  (LMP Unknown)   SpO2 93%   BMI 33.73 kg/m²     Physical Exam  Vital signs - reviewed.  Line/IV site - No erythema, warmth, induration, or tenderness.  Abdomen soft and nontender  She has a wound right buttock area with some fibrinous plug that was removed.  There is some exudate on the surface  No surrounding erythema    Lab Results:  CBC:   Results from last 7 days   Lab Units 24  0418 24  2112   WBC 10*3/mm3 6.75 8.62   HEMOGLOBIN g/dL 9.7* 8.9*   HEMATOCRIT % 32.4* 29.3*   PLATELETS 10*3/mm3 174 164     BMP:  Results from last 7 days   Lab Units 24  0418 24  0441 24  2112   SODIUM mmol/L 136 138 138   POTASSIUM mmol/L 4.1 4.0 3.7   CHLORIDE mmol/L 100 102 100   CO2 mmol/L 27.0 25.0 26.0   BUN mg/dL 23 18 19   CREATININE mg/dL 1.08* 1.09* 1.13*   GLUCOSE mg/dL 122* 155* 132*   CALCIUM mg/dL 8.4* 8.1* 8.5*   ALT (SGPT) U/L  --   --  23     Culture Results:   Blood Culture   Date Value Ref Range Status   2024 No growth at 5 days  Final   2024 No growth at 5 days  Final     Urine Culture   Date Value Ref Range Status   2024 25,000 CFU/mL Enterobacter cloacae complex CRE (A)  Preliminary     Comment:     Ertapenem to follow.  Consider infectious disease consult.  Susceptibility results may not correlate to clinical outcomes.  Carbapenem resistant Enterobacteriaceae, patient may be an isolation risk.   2024 " >100,000 CFU/mL Enterobacter cloacae complex (A)  Final     Comment:       This organism may develop resistance during prolonged therapy with 3rd generation cephalosporins (e.g. ceftriaxone) as a result of de-repression of AmpC B-lactamase.  Ceftriaxone may be a reasonable treatment option for uncomplicated cystitis or other lower severity infections when susceptibility is demonstrated.   06/12/2024 50,000 CFU/mL Mixed Selina Isolated  Final   Susceptibility     Enterobacter cloacae complex     LICHA     Cefepime <=1 ug/ml Susceptible     Ceftazidime 32 ug/ml Resistant     Ceftriaxone >=64 ug/ml Resistant     Gentamicin <=1 ug/ml Susceptible     Imipenem 1 ug/ml Intermediate     Levofloxacin >=8 ug/ml Resistant     Nitrofurantoin 128 ug/ml Resistant     Piperacillin + Tazobactam >=128 ug/ml Resistant     Trimethoprim + Sulfamethoxazole >=320 ug/ml Resistant      Radiology: None    Additional Studies Reviewed: None    Impression:   1.  Altered mental status-improving.  Probable UTI.  2.  Multiple different gram-negative bloodstream infections over the past few months  3.  Wound right buttock  4.  Rheumatoid arthritis    Recommendations:   Continue cefepime  Continue local care for the right buttock wound  Feel she most likely is having some problems with incomplete bladder emptying that is leading to her recurrent gram-negative urinary tract infection with bacteremia  We may need to consider resuming In-N-Out catheterizations which she has done previously following her spinal cord injury or consider transitioning to a chronic Horan catheter changed every 3 to 4 weeks  Will discuss further with patient  Continue current antibiotic treatment at present    Elie Ohara MD

## 2024-06-19 NOTE — THERAPY TREATMENT NOTE
Patient Name: Tawny Shin  : 1955    MRN: [id number]                              Today's Date: 2024       Admit Date: 2024    Visit Dx:     ICD-10-CM ICD-9-CM   1. Acute UTI (urinary tract infection)  N39.0 599.0   2. Altered mental status, unspecified altered mental status type  R41.82 780.97   3. Open wound of right buttock, sequela  S31.819S 906.0   4. Cyst of buttocks  L72.9 706.2   5. Dysphagia, unspecified type  R13.10 787.20   6. Impaired mobility [Z74.09]  Z74.09 799.89     Patient Active Problem List   Diagnosis    T12 compression fracture, initial encounter    Chronic embolism and thrombosis of unspecified deep veins of left lower extremity    Chronic anticoagulation    Iron deficiency anemia    Osteoporosis    E coli bacteremia    Epidural hematoma    Pleural effusion, left    Functional neurological symptom disorder with weakness or paralysis    Class 1 obesity due to excess calories with serious comorbidity and body mass index (BMI) of 33.0 to 33.9 in adult    Rheumatoid arthritis involving multiple sites    Chronic heart failure with preserved ejection fraction    Venous insufficiency    Coronary artery disease involving native coronary artery of native heart without angina pectoris    Sick sinus syndrome    Essential hypertension    Pressure injury of skin of heel    Chronic pain    Anemia of chronic disease    Cholelithiasis    Pressure injury of skin of buttock    Near functional paraplegia    Skin cancer    Squamous cell carcinoma of back    Hematoma    Right-sided chest wall pain    Hyperlipidemia LDL goal <70    Malodorous urine    Stage 3b chronic kidney disease    Cyst of buttocks    Sepsis due to Escherichia coli without acute organ dysfunction    Generalized weakness    Paralysis    History of urinary retention    Bacteremia due to Enterobacter species    Bacteremia    Hypothyroidism    Abnormal CT of the abdomen    Presence of cardiac pacemaker    Altered mental  "status    UTI (urinary tract infection)    Esophageal dysphagia     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    Elevated cholesterol     Eustachian tube dysfunction     Heart disease     Herpes simplex     History of transfusion     Hyperlipidemia     Hypertension     Hypothyroidism 12/23/2020    Intrinsic asthma 12/23/2020    Knee dislocation     Labral tear of right hip joint     Laryngitis sicca     Laryngitis, chronic     Left carotid bruit 03/09/2016    MI (myocardial infarction)     Myalgia due to statin 06/25/2019    Open wound of right hip 09/14/2021    Osteomyelitis of right femur 07/06/2021    Otorrhea     Pacemaker 11/17/2016    Primary osteoarthritis of left knee 12/23/2020    Psoriasis vulgaris 12/23/2020    S/P coronary artery stent placement 03/09/2016    Sensorineural hearing loss     Seropositive rheumatoid arthritis of multiple sites 12/27/2019    Overview:  -myochrysine '93-'96 -methotrexate '96--->11/98;r/s  restarted 2/99--> 8/14 (anemia) -sulfasalazine- not effective -penicillamine 6/98-->10/98; no effect -leflunomide 11/98--> - Humira '13-->didn't take - Enbrel 12/14-->3/15- no effect!   Last Assessment & Plan:  - \"aching all over\" because she had to be off her anti-rheumatic drugs for 2 weeks in preparation for her R knee surgery - he    Sick sinus syndrome 12/27/2019    Sjogren's disease     Spondylolisthesis of lumbar region 01/17/2018    Syncope, recurrent 02/08/2021    " Urinary tract infection     UTI (urinary tract infection) 04/19/2024     Past Surgical History:   Procedure Laterality Date    A-V CARDIAC PACEMAKER INSERTION  2016    ATRIAL CARDIAC PACEMAKER INSERTION      CARDIAC CATHETERIZATION      CATARACT EXTRACTION      CERVICAL CORPECTOMY N/A 3/3/2021    Procedure: CERVICAL 6 CORPECTOMY WITH TITANIUM CAGE WITH NEURO MONITORING;  Surgeon: Bandar Shea MD;  Location:  [facility] OR;  Service: Neurosurgery;  Laterality: N/A;    COLONOSCOPY  11/08/2011    One fold in the ascending colon which showed ulcer otherwise normal exam    COLONOSCOPY  11/12/2004    Normal exam repeat in five years    CORONARY ANGIOPLASTY WITH STENT PLACEMENT      X 2; 2013 & 2014    ENDOSCOPY  07/10/2014    Normal exam    ENDOSCOPY N/A 5/30/2024    Procedure: ESOPHAGOGASTRODUODENOSCOPY WITH ANESTHESIA;  Surgeon: Taiwo Sanchez MD;  Location: [facility] ENDOSCOPY;  Service: Gastroenterology;  Laterality: N/A;  preop; dysphagia  postop: balloon dilation  PCP Jesus Manuel jeff    FLAP LEG Right 9/14/2021    Procedure: RIGHT GLUTEAL FASCIOCUTANEOUS ADVANCEMENT FLAP AND RIGHT TENSOR FASCIAL JESSICA FLAP;  Surgeon: Amadeo Turner MD;  Location:  [facility] OR;  Service: Plastics;  Laterality: Right;    HIP ABDUCTION TENOTOMY BILATERAL Right 1/14/2021    Procedure: RIGHT HIP GLUTEUS MEDLUS / MINIMUS REPAIR, POSSIBLE ACHILLES ALLOGRAFT;  Surgeon: Nino Carlson MD;  Location:  [facility] OR;  Service: Orthopedics;  Laterality: Right;    INCISION AND DRAINAGE ABSCESS Right 6/4/2022    Procedure: INCISION AND DRAINAGE ABSCESS right hip;  Surgeon: Magda Salcido MD;  Location:  [facility] OR;  Service: General;  Laterality: Right;    INCISION AND DRAINAGE ABSCESS Right 6/10/2022    Procedure: RIGHT HIP INCISION AND DRAINAGE. MD NEEDS 3L VANC IRRIGATION, CURRETTES, DAICANS, KERLEX ROLLS;  Surgeon: Amadeo Turner MD;  Location:  [facility] OR;  Service: Plastics;  Laterality: Right;    INCISION AND DRAINAGE HIP Right 2/9/2021     Procedure: HIP INCISION AND DRAINAGE;  Surgeon: Nino Carlson MD;  Location:  [facility];  Service: Orthopedics;  Laterality: Right;    INCISION AND DRAINAGE LEG Right 10/24/2021    Procedure: INCISION AND DRAINAGE LOWER EXTREMITY;  Surgeon: Amadeo Turner MD;  Location:  [facility];  Service: Plastics;  Laterality: Right;    INCISION AND DRAINAGE OF WOUND Right 7/8/2021    Procedure: INCISION AND DRAINAGE WOUND RIGHT HIP;  Surgeon: James Huntley MD;  Location:  [facility] OR;  Service: Orthopedics;  Laterality: Right;    JOINT REPLACEMENT      KYPHOPLASTY WITH BIOPSY Bilateral 10/26/2021    Procedure: THOARCIC 12 KYPHOPLASTY WITH BIOPSY;  Surgeon: Bandar Shea MD;  Location:  [facility] OR;  Service: Neurosurgery;  Laterality: Bilateral;    LEG DEBRIDEMENT Right 9/14/2021    Procedure: DEBRIDEMENT OF RIGHT HIP WOUND, RIGHT GLUTEAL FASCIOCUTANEOUS ADVANCEMENT FLAP AND RIGHT TENSOR FASCIAL JESSICA FLAP;  Surgeon: Amadeo Turner MD;  Location:  [facility] OR;  Service: Plastics;  Laterality: Right;    LUMBAR DISCECTOMY Right 3/23/2021    Procedure: LUMBAR DISCECTOMY MICRO, Lumbar 1/2 right;  Surgeon: Bandar Shea MD;  Location:  [facility] OR;  Service: Neurosurgery;  Laterality: Right;    LUMBAR FUSION N/A 1/19/2018    Procedure: L3-4,L4-5 DECOMPRESSION, POSTERIOR SPINAL FUSION WITH INSTRUMENTATION;  Surgeon: Fortino Oropeza MD;  [facility];  Service:     LUMBAR LAMINECTOMY WITH FUSION Left 1/17/2018    Procedure: LEFT L3-4 L4-5 LATERAL LUMBAR INTERBODY FUSION;  Surgeon: Fortino Oropeza MD;  Location:  [facility] OR;  Service:     MASS EXCISION Right 4/23/2024    Procedure: RIGHT BUTTOCK MASS EXCISION;  Surgeon: Moises Keyes MD;  Location:  [facility] OR;  Service: General;  Laterality: Right;    MYRINGOTOMY W/ TUBES  09/04/2014    TUBES NO LONGER IN PLACE    OTHER SURGICAL HISTORY      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 so hardware was removed and spacers were placed    REPLACEMENT TOTAL KNEE Right        General Information       Row Name 06/19/24 0959          OT Time and Intention    Document Type therapy note (daily note)  -     Mode of Treatment occupational therapy  -       Row Name 06/19/24 0959          General Information    Patient Profile Reviewed yes  -     Existing Precautions/Restrictions fall  -LS       Row Name 06/19/24 0959          Cognition    Orientation Status (Cognition) oriented x 3  -       Row Name 06/19/24 0959          Safety Issues, Functional Mobility    Safety Issues Affecting Function (Mobility) safety precautions follow-through/compliance  -     Impairments Affecting Function (Mobility) balance;endurance/activity tolerance;pain;strength;range of motion (ROM)  -               User Key  (r) = Recorded By, (t) = Taken By, (c) = Cosigned By      Initials Name Provider Type     Bianca Mcgarry OTR/L Occupational Therapist                     Mobility/ADL's       Row Name 06/19/24 0959          Bed Mobility    Bed Mobility supine-sit  -LS     Supine-Sit [place] (Bed Mobility) moderate assist (50% patient effort);verbal cues  -     Assistive Device (Bed Mobility) bed rails;head of bed elevated  -       Row Name 06/19/24 0959          Transfers    Transfers sit-stand transfer;stand-sit transfer;bed-chair transfer  -       Row Name 06/19/24 0959          Bed-Chair Transfer    Bed-Chair [place] (Transfers) 2 person assist;verbal cues;nonverbal cues (demo/gesture);minimum assist (75% patient effort);moderate assist (50% patient effort)  -       Row Name 06/19/24 0959          Sit-Stand Transfer    Sit-Stand [place] (Transfers) minimum assist (75% patient effort);2 person assist;verbal cues;nonverbal cues (demo/gesture)  -     Assistive Device (Sit-Stand Transfers) walker, front-wheeled  -       Row Name 06/19/24 0959          Stand-Sit Transfer    Stand-Sit [place] (Transfers) minimum assist (75% patient effort);2 person assist;verbal  cues;nonverbal cues (demo/gesture)  -LS     Assistive Device (Stand-Sit Transfers) walker, front-wheeled  -LS               User Key  (r) = Recorded By, (t) = Taken By, (c) = Cosigned By      Initials Name Provider Type    Bianca Saleh, OTR/L Occupational Therapist                   Obj/Interventions    No documentation.                  Goals/Plan    No documentation.                  Clinical Impression       Row Name 06/19/24 0959          Pain Assessment    Pretreatment Pain Rating 0/10 - no pain  -LS     Posttreatment Pain Rating 4/10  -LS     Pain Location - back  -LS     Pain Intervention(s) Repositioned;Ambulation/increased activity  -       Row Name 06/19/24 0959          Plan of Care Review    Plan of Care Reviewed With patient;caregiver  -LS     Progress improving  -     Outcome Evaluation OT tx completed. Pt in fowlers upon therapist arrival; A&Ox3; No c/o pain at rest; Caregiver present. Pt performed supine>sit utilizing bedrail with HOB elevated with Mod A and verbal cues for sequencing and body mechanics. Pt performed sit<>stand and took a few steps from bed>chair utilizing rwx with Min-Mod A x2; Pt requires increased assistance when stepping backward due to great difficulty advancing BLE backward. Pt continues to benefit from skilled OT intervention in order to address remaining deficits in fxl mobility, fxl activity tolerance, balance, strength, and use of adaptive techniques/equipment during performance of BADLs. Recommend home with /7 care and HH at discharge.  -       Row Name 06/19/24 0959          Therapy Plan Review/Discharge Plan (OT)    Anticipated Discharge Disposition (OT) home with / care;home with home health  -       Row Name 06/19/24 0959          Positioning and Restraints    Pre-Treatment Position in bed  -LS     Post Treatment Position chair  -LS     In Chair reclined;call light within reach;encouraged to call for assist;legs elevated;with family/caregiver  -                User Key  (r) = Recorded By, (t) = Taken By, (c) = Cosigned By      Initials Name Provider Type    Bianca Saleh OTR/L Occupational Therapist                   Outcome Measures       Row Name 06/19/24 0959          How much help from another is currently needed...    Putting on and taking off regular lower body clothing? 1  -LS     Bathing (including washing, rinsing, and drying) 2  -LS     Toileting (which includes using toilet bed pan or urinal) 2  -LS     Putting on and taking off regular upper body clothing 3  -LS     Taking care of personal grooming (such as brushing teeth) 3  -LS     Eating meals 4  -LS     AM-PAC 6 Clicks Score (OT) 15  -LS       Row Name 06/19/24 0850          How much help from another person do you currently need...    Turning from your back to your side while in flat bed without using bedrails? 3  -MS     Moving from lying on back to sitting on the side of a flat bed without bedrails? 3  -MS     Moving to and from a bed to a chair (including a wheelchair)? 2  -MS     Standing up from a chair using your arms (e.g., wheelchair, bedside chair)? 2  -MS     Climbing 3-5 steps with a railing? 1  -MS     To walk in hospital room? 1  -MS     AM-PAC 6 Clicks Score (PT) 12  -MS     Highest Level of Mobility Goal 4 --> Transfer to chair/commode  -MS       Row Name 06/19/24 0959          Functional Assessment    Outcome Measure Options AM-PAC 6 Clicks Daily Activity (OT)  -LS               User Key  (r) = Recorded By, (t) = Taken By, (c) = Cosigned By      Initials Name Provider Type    Leeann Pate, RN Registered Nurse    Bianca Saleh OTR/L Occupational Therapist                    Occupational Therapy Education       Title: PT OT SLP Therapies (In Progress)       Topic: Occupational Therapy (In Progress)       Point: ADL training (Done)       Description:   Instruct learner(s) on proper safety adaptation and remediation techniques during self care or transfers.    Instruct in proper use of assistive devices.                  Learning Progress Summary             Patient Acceptance, E, VU by EC at 6/17/2024 1525                         Point: Home exercise program (Not Started)       Description:   Instruct learner(s) on appropriate technique for monitoring, assisting and/or progressing therapeutic exercises/activities.                  Learner Progress:  Not documented in this visit.              Point: Precautions (Done)       Description:   Instruct learner(s) on prescribed precautions during self-care and functional transfers.                  Learning Progress Summary             Patient Acceptance, E, VU,NR by  at 6/19/2024 1136    Acceptance, E, VU by EC at 6/17/2024 1525                         Point: Body mechanics (Done)       Description:   Instruct learner(s) on proper positioning and spine alignment during self-care, functional mobility activities and/or exercises.                  Learning Progress Summary             Patient Acceptance, E, VU,NR by  at 6/19/2024 1136    Acceptance, E, VU by EC at 6/17/2024 1525                                         User Key       Initials Effective Dates Name Provider Type Discipline     06/20/22 -  Bianca Mcgarry, OTR/L Occupational Therapist OT    EC 10/13/23 -  Ashley Martino OTR/L Occupational Therapist OT                  OT Recommendation and Plan     Plan of Care Review  Plan of Care Reviewed With: patient, caregiver  Progress: improving  Outcome Evaluation: OT tx completed. Pt in fowlers upon therapist arrival; A&Ox3; No c/o pain at rest; Caregiver present. Pt performed supine>sit utilizing bedrail with HOB elevated with Mod A and verbal cues for sequencing and body mechanics. Pt performed sit<>stand and took a few steps from bed>chair utilizing rwx with Min-Mod A x2; Pt requires increased assistance when stepping backward due to great difficulty advancing BLE backward. Pt continues to benefit from skilled OT  intervention in order to address remaining deficits in fxl mobility, fxl activity tolerance, balance, strength, and use of adaptive techniques/equipment during performance of BADLs. Recommend home with 24/7 care and HH at discharge.     Time Calculation:         Time Calculation- OT       Row Name 06/19/24 0959             Time Calculation- OT    OT Start Time 0959  -LS      OT Stop Time 1023  -LS      OT Time Calculation (min) 24 min  -LS      Total Timed Code Minutes- OT 24 minute(s)  -LS      OT Received On 06/19/24  -                User Key  (r) = Recorded By, (t) = Taken By, (c) = Cosigned By      Initials Name Provider Type    LS Bianca Mcgarry OTR/L Occupational Therapist                  Therapy Charges for Today       Code Description Service Date Service Provider Modifiers Qty    [id number] HC OT SELF CARE/MGMT/TRAIN EA 15 MIN 6/19/2024 Bianca Mcgarry OTR/L GO 2                 Bianca Mcgarry OTR/KARUNA  6/19/2024

## 2024-06-19 NOTE — THERAPY TREATMENT NOTE
Acute Care - Physical Therapy Treatment Note  [facility]     Patient Name: Tawny Shin  : 1955  MRN: [id number]  Today's Date: 2024      Visit Dx:     ICD-10-CM ICD-9-CM   1. Acute UTI (urinary tract infection)  N39.0 599.0   2. Altered mental status, unspecified altered mental status type  R41.82 780.97   3. Open wound of right buttock, sequela  S31.819S 906.0   4. Cyst of buttocks  L72.9 706.2   5. Dysphagia, unspecified type  R13.10 787.20   6. Impaired mobility [Z74.09]  Z74.09 799.89     Patient Active Problem List   Diagnosis    T12 compression fracture, initial encounter    Chronic embolism and thrombosis of unspecified deep veins of left lower extremity    Chronic anticoagulation    Iron deficiency anemia    Osteoporosis    E coli bacteremia    Epidural hematoma    Pleural effusion, left    Functional neurological symptom disorder with weakness or paralysis    Class 1 obesity due to excess calories with serious comorbidity and body mass index (BMI) of 33.0 to 33.9 in adult    Rheumatoid arthritis involving multiple sites    Chronic heart failure with preserved ejection fraction    Venous insufficiency    Coronary artery disease involving native coronary artery of native heart without angina pectoris    Sick sinus syndrome    Essential hypertension    Pressure injury of skin of heel    Chronic pain    Anemia of chronic disease    Cholelithiasis    Pressure injury of skin of buttock    Near functional paraplegia    Skin cancer    Squamous cell carcinoma of back    Hematoma    Right-sided chest wall pain    Hyperlipidemia LDL goal <70    Malodorous urine    Stage 3b chronic kidney disease    Cyst of buttocks    Sepsis due to Escherichia coli without acute organ dysfunction    Generalized weakness    Paralysis    History of urinary retention    Bacteremia due to Enterobacter species    Bacteremia    Hypothyroidism    Abnormal CT of the abdomen    Presence of cardiac pacemaker    Altered mental  "status    UTI (urinary tract infection)    Esophageal dysphagia     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    Elevated cholesterol     Eustachian tube dysfunction     Heart disease     Herpes simplex     History of transfusion     Hyperlipidemia     Hypertension     Hypothyroidism 12/23/2020    Intrinsic asthma 12/23/2020    Knee dislocation     Labral tear of right hip joint     Laryngitis sicca     Laryngitis, chronic     Left carotid bruit 03/09/2016    MI (myocardial infarction)     Myalgia due to statin 06/25/2019    Open wound of right hip 09/14/2021    Osteomyelitis of right femur 07/06/2021    Otorrhea     Pacemaker 11/17/2016    Primary osteoarthritis of left knee 12/23/2020    Psoriasis vulgaris 12/23/2020    S/P coronary artery stent placement 03/09/2016    Sensorineural hearing loss     Seropositive rheumatoid arthritis of multiple sites 12/27/2019    Overview:  -myochrysine '93-'96 -methotrexate '96--->11/98;r/s  restarted 2/99--> 8/14 (anemia) -sulfasalazine- not effective -penicillamine 6/98-->10/98; no effect -leflunomide 11/98--> - Humira '13-->didn't take - Enbrel 12/14-->3/15- no effect!   Last Assessment & Plan:  - \"aching all over\" because she had to be off her anti-rheumatic drugs for 2 weeks in preparation for her R knee surgery - he    Sick sinus syndrome 12/27/2019    Sjogren's disease     Spondylolisthesis of lumbar region 01/17/2018    Syncope, recurrent 02/08/2021    " Urinary tract infection     UTI (urinary tract infection) 04/19/2024     Past Surgical History:   Procedure Laterality Date    A-V CARDIAC PACEMAKER INSERTION  2016    ATRIAL CARDIAC PACEMAKER INSERTION      CARDIAC CATHETERIZATION      CATARACT EXTRACTION      CERVICAL CORPECTOMY N/A 3/3/2021    Procedure: CERVICAL 6 CORPECTOMY WITH TITANIUM CAGE WITH NEURO MONITORING;  Surgeon: Bandar Shea MD;  Location:  [facility] OR;  Service: Neurosurgery;  Laterality: N/A;    COLONOSCOPY  11/08/2011    One fold in the ascending colon which showed ulcer otherwise normal exam    COLONOSCOPY  11/12/2004    Normal exam repeat in five years    CORONARY ANGIOPLASTY WITH STENT PLACEMENT      X 2; 2013 & 2014    ENDOSCOPY  07/10/2014    Normal exam    ENDOSCOPY N/A 5/30/2024    Procedure: ESOPHAGOGASTRODUODENOSCOPY WITH ANESTHESIA;  Surgeon: Taiwo Sanchez MD;  Location: [facility] ENDOSCOPY;  Service: Gastroenterology;  Laterality: N/A;  preop; dysphagia  postop: balloon dilation  PCP Jesus Manuel jeff    FLAP LEG Right 9/14/2021    Procedure: RIGHT GLUTEAL FASCIOCUTANEOUS ADVANCEMENT FLAP AND RIGHT TENSOR FASCIAL JESSICA FLAP;  Surgeon: Amadeo Turner MD;  Location:  [facility] OR;  Service: Plastics;  Laterality: Right;    HIP ABDUCTION TENOTOMY BILATERAL Right 1/14/2021    Procedure: RIGHT HIP GLUTEUS MEDLUS / MINIMUS REPAIR, POSSIBLE ACHILLES ALLOGRAFT;  Surgeon: Nino Carlson MD;  Location:  [facility] OR;  Service: Orthopedics;  Laterality: Right;    INCISION AND DRAINAGE ABSCESS Right 6/4/2022    Procedure: INCISION AND DRAINAGE ABSCESS right hip;  Surgeon: Magda Salcido MD;  Location:  [facility] OR;  Service: General;  Laterality: Right;    INCISION AND DRAINAGE ABSCESS Right 6/10/2022    Procedure: RIGHT HIP INCISION AND DRAINAGE. MD NEEDS 3L VANC IRRIGATION, CURRETTES, DAICANS, KERLEX ROLLS;  Surgeon: Amadeo Turner MD;  Location:  [facility] OR;  Service: Plastics;  Laterality: Right;    INCISION AND DRAINAGE HIP Right 2/9/2021     Procedure: HIP INCISION AND DRAINAGE;  Surgeon: Nino Carlson MD;  Location:  [facility] OR;  Service: Orthopedics;  Laterality: Right;    INCISION AND DRAINAGE LEG Right 10/24/2021    Procedure: INCISION AND DRAINAGE LOWER EXTREMITY;  Surgeon: Amadeo Turner MD;  Location:  [facility];  Service: Plastics;  Laterality: Right;    INCISION AND DRAINAGE OF WOUND Right 7/8/2021    Procedure: INCISION AND DRAINAGE WOUND RIGHT HIP;  Surgeon: James Huntley MD;  Location:  [facility] OR;  Service: Orthopedics;  Laterality: Right;    JOINT REPLACEMENT      KYPHOPLASTY WITH BIOPSY Bilateral 10/26/2021    Procedure: THOARCIC 12 KYPHOPLASTY WITH BIOPSY;  Surgeon: Bandar Shea MD;  Location:  [facility] OR;  Service: Neurosurgery;  Laterality: Bilateral;    LEG DEBRIDEMENT Right 9/14/2021    Procedure: DEBRIDEMENT OF RIGHT HIP WOUND, RIGHT GLUTEAL FASCIOCUTANEOUS ADVANCEMENT FLAP AND RIGHT TENSOR FASCIAL JESSICA FLAP;  Surgeon: Amadeo Turner MD;  Location:  [facility] OR;  Service: Plastics;  Laterality: Right;    LUMBAR DISCECTOMY Right 3/23/2021    Procedure: LUMBAR DISCECTOMY MICRO, Lumbar 1/2 right;  Surgeon: Bandar Shea MD;  Location:  [facility];  Service: Neurosurgery;  Laterality: Right;    LUMBAR FUSION N/A 1/19/2018    Procedure: L3-4,L4-5 DECOMPRESSION, POSTERIOR SPINAL FUSION WITH INSTRUMENTATION;  Surgeon: Fortino Oropeza MD;  Location[facility] OR;  Service:     LUMBAR LAMINECTOMY WITH FUSION Left 1/17/2018    Procedure: LEFT L3-4 L4-5 LATERAL LUMBAR INTERBODY FUSION;  Surgeon: Fortino Oropeza MD;  Location:  [facility] OR;  Service:     MASS EXCISION Right 4/23/2024    Procedure: RIGHT BUTTOCK MASS EXCISION;  Surgeon: Moises Keyes MD;  Location:  [facility] OR;  Service: General;  Laterality: Right;    MYRINGOTOMY W/ TUBES  09/04/2014    TUBES NO LONGER IN PLACE    OTHER SURGICAL HISTORY      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 so hardware was removed and spacers were placed    REPLACEMENT TOTAL KNEE Right       PT Assessment (Last 12 Hours)       PT Evaluation and Treatment       [facility] Name 06/19/24 1422 06/19/24 1421       Physical Therapy Time and Intention    Subjective Information no complaints  - --  -    Document Type therapy note (daily note)  - --    Mode of Treatment physical therapy  - --      [facility] Name 06/19/24 1303 06/19/24 1036       Physical Therapy Time and Intention    Subjective Information no complaints  - --  -    Document Type therapy note (daily note)  - --    Mode of Treatment physical therapy  - --    Session Not Performed -- other (see comments)  -    Comment, Session Not Performed -- just worked with OT, pt requested to check back after lunch  -[facility] Name 06/19/24 1422 06/19/24 1303       General Information    Existing Precautions/Restrictions fall  - fall  -AH      Row Name 06/19/24 1422 06/19/24 1303       Pain    Pretreatment Pain Rating 0/10 - no pain  - 0/10 - no pain  -    Posttreatment Pain Rating 0/10 - no pain  - 0/10 - no pain  -AH      Row Name 06/19/24 1303          Bed Mobility    Supine-Sit [place] (Bed Mobility) --  CHAIR  -     Sit-Supine [place] (Bed Mobility) moderate assist (50% patient effort);2 person assist;verbal cues  -[facility] Name 06/19/24 1422 06/19/24 1303       Transfers    Transfers chair-bed transfer  - chair-bed transfer  -AH      Row Name 06/19/24 1303          Chair-Bed Transfer    Chair-Bed [place] (Transfers) minimum assist (75% patient effort);2 person assist;verbal cues  [facility]     Assistive Device (Chair-Bed Transfers) walker, front-wheeled  -AH       Row Name 06/19/24 1303          Sit-Stand Transfer    Sit-Stand [place] (Transfers) minimum assist (75% patient effort);moderate assist (50% patient effort);2 person assist;verbal cues  -AH       Row Name 06/19/24 1303          Stand-Sit Transfer    Stand-Sit [place] (Transfers) minimum assist (75% patient effort);2 person assist;verbal cues  -        Row Name 06/19/24 1422          Motor Skills    Comments, Therapeutic Exercise [facility] 2 [facility]  -     Additional Documentation Comments, Therapeutic Exercise (Row)  -       Row Name             Wound 04/23/24 0936 Right gluteal Incision    Wound - Properties Group Placement Date: 04/23/24  -EP Placement Time: 0936  -EP Present on Original Admission: N  -EP Side: Right  -EP Location: gluteal  -EP Primary Wound Type: Incision  -EP    Retired Wound - Properties Group Placement Date: 04/23/24  -EP Placement Time: 0936  -EP Present on Original Admission: N  -EP Side: Right  -EP Location: gluteal  -EP Primary Wound Type: Incision  -EP    Retired Wound - Properties Group Date first assessed: 04/23/24  -EP Time first assessed: 0936  -EP Present on Original Admission: N  -EP Side: Right  -EP Location: gluteal  -EP Primary Wound Type: Incision  -EP      Row Name 06/19/24 1422          Plan of Care Review    Plan of Care Reviewed With patient;caregiver  -     Progress improving  -     Outcome Evaluation pt in chair, trans sit-stand min-mod 2, took few steps chair-bed with rwx min 2, trans back to bed mod 2, BLE HEP A-AAROM 10 x 2 reps,  -AH       Row Name 06/19/24 1422 06/19/24 1303       Positioning and Restraints    Pre-Treatment Position in bed  - sitting in chair/recliner  -    Post Treatment Position bed  - bed  -    In Bed fowlers;call light within reach;encouraged to call for assist;with family/caregiver  - fowlers;call light within reach;encouraged to call for assist  -              User Key  (r) = Recorded By, (t) = Taken By, (c) = Cosigned By      Initials Name Provider Type     Olga Chambers PTA Physical Therapist Assistant    Sonny Thompson RN Registered Nurse                    Physical Therapy Education       Title: PT OT SLP Therapies (In Progress)       Topic: Physical Therapy (Done)       Point: Mobility training (Done)       Learning Progress Summary              Patient Acceptance, E, VU by MS at 6/18/2024 1502    Comment: role of PT in her care                         Point: Home exercise program (Done)       Learning Progress Summary             Patient Acceptance, E,D,TB, VU,DU,NR by  at 6/15/2024 1514    Acceptance, E,TB,D, VU,DU,NR by  at 6/14/2024 1550                         Point: Body mechanics (Done)       Learning Progress Summary             Patient Acceptance, E,D,TB, VU,DU,NR by  at 6/15/2024 1514    Acceptance, E,TB,D, VU,DU,NR by  at 6/14/2024 1550                         Point: Precautions (Done)       Learning Progress Summary             Patient Acceptance, E,D,TB, VU,DU,NR by  at 6/15/2024 1514    Acceptance, E,TB,D, VU,DU,NR by  at 6/14/2024 1550                                         User Key       Initials Effective Dates Name Provider Type Discipline    MS 07/11/23 -  Nicole Olson, PT, DPT, NCS Physical Therapist PT     10/17/23 -  Stephanie Doll, RN Registered Nurse Nurse                  PT Recommendation and Plan     Plan of Care Reviewed With: patient, caregiver  Progress: improving  Outcome Evaluation: pt in chair, trans sit-stand min-mod 2, took few steps chair-bed with rwx min 2, trans back to bed mod 2, BLE HEP A-AAROM 10 x 2 reps,   Outcome Measures       Row Name 06/19/24 1300 06/18/24 1400 06/17/24 1500       How much help from another person do you currently need...    Turning from your back to your side while in flat bed without using bedrails? 3  -AH -- --    Moving from lying on back to sitting on the side of a flat bed without bedrails? 3  -AH -- --    Moving to and from a bed to a chair (including a wheelchair)? 2  -AH -- --    Standing up from a chair using your arms (e.g., wheelchair, bedside chair)? 2  -AH -- --    Climbing 3-5 steps with a railing? 1  -AH -- --    To walk in hospital room? 1  -AH -- --    AM-PAC 6 Clicks Score (PT) 12  -AH -- --    Highest Level of Mobility Goal 4 --> Transfer to  chair/commode  -AH -- --       How much help from another is currently needed...    Putting on and taking off regular lower body clothing? -- 1  -TS 1  -EC    Bathing (including washing, rinsing, and drying) -- 2  -TS 2  -EC    Toileting (which includes using toilet bed pan or urinal) -- 2  -TS 2  -EC    Putting on and taking off regular upper body clothing -- 3  -TS 3  -EC    Taking care of personal grooming (such as brushing teeth) -- 3  -TS 3  -EC    Eating meals -- 4  -TS 4  -EC    AM-PAC 6 Clicks Score (OT) -- 15  -TS 15  -EC       Functional Assessment    Outcome Measure Options AM-PAC 6 Clicks Basic Mobility (PT)  -AH -- AM-[facility] 6 Clicks Daily Activity (OT)  -EC              User Key  (r) = Recorded By, (t) = Taken By, (c) = Cosigned By      Initials Name Provider Type    Olga Rao PTA Physical Therapist Assistant    Carolee Roger COTA Occupational Therapist Assistant    EC Ashley Martino, OTR/L Occupational Therapist                     Time Calculation:    PT Charges       Row Name 06/19/24 1508 06/19/24 1353          Time Calculation    Start Time 1422  - 1303  -     Stop Time 1452  - 1326  -     Time Calculation (min) 30 min  - 23 min  -     PT Received On 06/19/24  - 06/19/24  -        Time Calculation- PT    Total Timed Code Minutes- PT 30 minute(s)  - 23 minute(s)  -        Timed Charges    62377 - PT Therapeutic Exercise Minutes 30  -AH --     43849 - PT Therapeutic Activity Minutes -- 23  -AH        Total Minutes    Timed Charges Total Minutes 30  -AH 23  -AH      Total Minutes 30  -AH 23  -AH               User Key  (r) = Recorded By, (t) = Taken By, (c) = Cosigned By      Initials Name Provider Type    Olga Rao PTA Physical Therapist Assistant                  Therapy Charges for Today       Code Description Service Date Service Provider Modifiers Qty    [id number]  PT THERAPEUTIC ACT EA 15 MIN 6/19/2024 Olga Chambers PTA GP 2    [id number]  HC PT THERAPEUTIC ACT EA 15 MIN 6/19/2024 Olga Chambers, PTA GP 2            PT G-Codes  Outcome Measure Options: AM-PAC 6 Clicks Basic Mobility (PT)  AM-PAC 6 Clicks Score (PT): 12  AM-PAC 6 Clicks Score (OT): 15    Olga Chambers PTA  6/19/2024

## 2024-06-19 NOTE — PLAN OF CARE
Goal Outcome Evaluation:  Plan of Care Reviewed With: patient        Progress: improving  Outcome Evaluation: Pt A&OX4. VSS on RA. PPP. C/O pain managed W/ PRN meds. Voiding via purwick. IV to R FA IID. Dressing to coccyx changed. Family @ bedside. Call light within reach, safety maintained.                                weight-bearing as tolerated

## 2024-06-19 NOTE — PROGRESS NOTES
"INFECTIOUS DISEASES PROGRESS NOTE    Patient:  Tawny Shin  YOB: 1955  MRN: [id number]   Admit date: 6/12/2024   Admitting Physician: Saulo Ambriz MD  Primary Care Physician: Moises Oliveira MD    Chief Complaint: \"Cannot pee\"        Interval History: I discussed with patient and her caregiver about her recurrent admissions and the fact she did not have this many issues when she had a indwelling Hoarn catheter.  I was under the impression she did in and out catheterizations at 1 point however she indicated she did not and her caregiver agreed.  She did not know what I was talking about.    When I described a Horan catheter her caregiver said that she had a catheter in place and that they would clean it.  If it became cloudy or if there was an odor when they emptied it they would really push her to drink water and it would usually clear up.    Ms. Shin asked if that is not what she had now and I pointed to the wall and explained to her she has a pure wick catheter.  She is reluctant to have a Horan catheter replaced and she said, \"that is why I cannot pee\" when asked her what she meant by that she indicated that ever since she has had that catheter in for such a long time she has had difficulty urinating.  She said sometimes she does not feel like she empties her bladder all the way up and other times she cannot go at all.    They are using the intravaginal estrogen cream.  They said someone told her to only use it once or twice a week.  I reiterated that twice a week was considered adequate but not less than that.    Allergies:   Allergies   Allergen Reactions    Atorvastatin Other (See Comments)     LEG CRAMPS      Amoxicillin Rash    Escitalopram Rash    Nabumetone Rash    Niacin Er Rash    Penicillin G Rash    Penicillins Rash    Simvastatin Rash       Current Scheduled Medications:   apixaban, 5 mg, Oral, BID  ascorbic acid, 500 mg, Oral, Daily  aspirin, 81 mg, Oral, " "Daily  carvedilol, 25 mg, Oral, BID With Meals  cefepime, 2,000 mg, Intravenous, Q12H  DULoxetine, 60 mg, Oral, Daily  estradiol, 2 g, Vaginal, Daily  ferrous sulfate, 325 mg, Oral, Daily With Breakfast  folic acid, 1,000 mcg, Oral, Daily  furosemide, 40 mg, Oral, Daily  isosorbide mononitrate, 60 mg, Oral, Daily  leflunomide, 20 mg, Oral, Daily  levothyroxine, 75 mcg, Oral, Q AM  losartan, 50 mg, Oral, Daily  methenamine, 1 g, Oral, BID  multivitamin with minerals, 1 tablet, Oral, Daily  pantoprazole, 40 mg, Oral, Daily  sodium chloride, 10 mL, Intravenous, Q12H  spironolactone, 25 mg, Oral, Daily      Current PRN Medications:    acetaminophen    senna-docusate sodium **AND** polyethylene glycol **AND** bisacodyl **AND** bisacodyl    Calcium Replacement - Follow Nurse / BPA Driven Protocol    Diclofenac Sodium    Lidocaine    Magnesium Low Dose Replacement - Follow Nurse / BPA Driven Protocol    [DISCONTINUED] Morphine **AND** naloxone    nitroglycerin    ondansetron    Phosphorus Replacement - Follow Nurse / BPA Driven Protocol    Potassium Replacement - Follow Nurse / BPA Driven Protocol    sodium chloride    sodium chloride    sodium chloride    traMADol            Objective     Vital Signs:  Temp (24hrs), Av.1 °F (36.7 °C), Min:97.6 °F (36.4 °C), Max:98.4 °F (36.9 °C)      /47 (BP Location: Right arm, Patient Position: Lying)   Pulse 80   Temp 98 °F (36.7 °C) (Oral)   Resp 18   Ht 167.6 cm (66\")   Wt 94.8 kg (209 lb)   LMP  (LMP Unknown)   SpO2 93%   BMI 33.73 kg/m²         Physical Exam:  General: The patient is lying in the recliner in no acute distress  Respiratory: Effort even and unlabored, she is not conversationally dyspneic  Abdomen: Soft, nontender, nondistended   pure wick catheter in place  Neuro: She was alert, speech clear, she did seem unclear on specifics regarding indwelling Horan catheter versus in and out catheterization versus the pure wick which she has currently.  Psych: " Miguel correa    Right gluteal wound      Results Review:    I reviewed the patient's new clinical results.    Lab Results:    CBC:   Lab Results   Lab 06/12/24 2112 06/18/24 0418 06/19/24  0122   WBC 8.62 6.75 6.69   HEMOGLOBIN 8.9* 9.7* 7.4*   HEMATOCRIT 29.3* 32.4* 25.3*   PLATELETS 164 174 168        AutoDiff:   Lab Results   Lab 06/12/24 2112   NEUTROPHIL % 85.0*   LYMPHOCYTE % 8.5*   MONOCYTES % 4.6*   EOSINOPHIL % 0.0*   BASOPHIL % 0.3   NEUTROS ABS 7.32*   LYMPHS ABS 0.73   MONOS ABS 0.40   EOS ABS 0.00   BASOS ABS 0.03        Manual Diff:    Lab Results   Lab 06/12/24 2112   NEUTROS ABS 7.32*           CMP:   Lab Results   Lab 06/12/24 2112 06/13/24 0441 06/18/24 0418 06/19/24  0018   SODIUM 138 138 136 135*   POTASSIUM 3.7 4.0 4.1 3.7   CHLORIDE 100 102 100 101   CO2 26.0 25.0 27.0 26.0   BUN 19 18 23 23   CREATININE 1.13* 1.09* 1.08* 1.16*   CALCIUM 8.5* 8.1* 8.4* 8.3*   BILIRUBIN 0.5  --   --   --    ALK PHOS 77  --   --   --    ALT (SGPT) 23  --   --   --    AST (SGOT) 27  --   --   --    GLUCOSE 132* 155* 122* 120*       Estimated Creatinine Clearance: 52.4 mL/min (A) (by C-G formula based on SCr of 1.16 mg/dL (H)).    Culture Results:    Microbiology Results (last 10 days)       Procedure Component Value - Date/Time    Urine Culture - Urine, Urine, Clean Catch [[id number]]  (Abnormal)  (Susceptibility) Collected: 06/16/24 1219    Lab Status: Final result Specimen: Urine, Clean Catch Updated: 06/19/24 0936     Urine Culture 25,000 CFU/mL Enterobacter cloacae complex CRE     Comment:   Consider infectious disease consult.  Susceptibility results may not correlate to clinical outcomes.  Carbapenem resistant Enterobacteriaceae, patient may be an isolation risk.  No carbapenemase detected.       Narrative:      Colonization of the urinary tract without infection is common. Treatment is discouraged unless the patient is symptomatic, pregnant, or undergoing an invasive urologic procedure.     Susceptibility        Enterobacter cloacae complex CRE      LICHA      Cefepime Susceptible      Ceftazidime Resistant      Ceftriaxone Resistant      Gentamicin Susceptible      Imipenem Intermediate      Levofloxacin Resistant      Meropenem Resistant      Nitrofurantoin Resistant      Piperacillin + Tazobactam Resistant      Trimethoprim + Sulfamethoxazole Resistant                           COVID PRE-OP / PRE-PROCEDURE SCREENING ORDER (NO ISOLATION) - Swab, Nasopharynx [[id number]]  (Normal) Collected: 06/12/24 2125    Lab Status: Final result Specimen: Swab from Nasopharynx Updated: 06/12/24 2156    Narrative:      The following orders were created for panel order COVID PRE-OP / PRE-PROCEDURE SCREENING ORDER (NO ISOLATION) - Swab, Nasopharynx.  Procedure                               Abnormality         Status                     ---------                               -----------         ------                     COVID-19 and FLU A/B PCR...[[id number]]  Normal              Final result                 Please view results for these tests on the individual orders.    COVID-19 and FLU A/B PCR, 1 HR TAT - Swab, Nasopharynx [[id number]]  (Normal) Collected: 06/12/24 2125    Lab Status: Final result Specimen: Swab from Nasopharynx Updated: 06/12/24 2156     COVID19 Not Detected     Influenza A PCR Not Detected     Influenza B PCR Not Detected    Narrative:      Fact sheet for providers: https://www.fda.gov/media/864299/download    Fact sheet for patients: https://www.fda.gov/media/289466/download    Test performed by PCR.    Blood Culture - Blood, Arm, Left [[id number]]  (Normal) Collected: 06/12/24 2120    Lab Status: Final result Specimen: Blood from Arm, Left Updated: 06/17/24 2145     Blood Culture No growth at 5 days    Urine Culture - Urine, Straight Cath [[id number]]  (Abnormal)  (Susceptibility) Collected: 06/12/24 2117    Lab Status: Final result Specimen: Urine from Straight Cath Updated: 06/17/24 1203     Urine  Culture >100,000 CFU/mL Enterobacter cloacae complex     Comment:   This organism may develop resistance during prolonged therapy with 3rd generation cephalosporins (e.g. ceftriaxone) as a result of de-repression of AmpC B-lactamase.  Ceftriaxone may be a reasonable treatment option for uncomplicated cystitis or other lower severity infections when susceptibility is demonstrated.         50,000 CFU/mL Mixed Selina Isolated    Narrative:      Specimen contains mixed organisms of questionable pathogenicity suggestive of contamination. If symptoms persist, suggest recollection.  Colonization of the urinary tract without infection is common. Treatment is discouraged unless the patient is symptomatic, pregnant, or undergoing an invasive urologic procedure.    Susceptibility        Enterobacter cloacae complex      LICHA      Cefepime Susceptible      Ceftazidime Resistant      Ceftriaxone Resistant      Gentamicin Susceptible      Imipenem Intermediate      Levofloxacin Resistant      Nitrofurantoin Resistant      Piperacillin + Tazobactam Resistant      Trimethoprim + Sulfamethoxazole Resistant                           Blood Culture - Blood, Arm, Right [[id number]]  (Normal) Collected: 06/12/24 2112    Lab Status: Final result Specimen: Blood from Arm, Right Updated: 06/17/24 2145     Blood Culture No growth at 5 days                 Radiology:   Imaging Results (Last 72 Hours)       ** No results found for the last 72 hours. **                Active Hospital Problems    Diagnosis     **Altered mental status     Esophageal dysphagia     UTI (urinary tract infection)     Hypothyroidism     Stage 3b chronic kidney disease     Essential hypertension     Sick sinus syndrome     Venous insufficiency     Rheumatoid arthritis involving multiple sites     Chronic heart failure with preserved ejection fraction     Chronic anticoagulation     Chronic embolism and thrombosis of unspecified deep veins of left lower extremity         IMPRESSION:  Altered mental status on admission-resolved  Possible urinary tract infection with Enterobacter cloacae  Recurrent very frequent admissions with probable urinary tract infections, some associated with bacteremia and different organisms.  No intra-abdominal process has been identified.  Right gluteal wound      RECOMMENDATION:   Order placed to discontinue pure wick catheter  Order placed for in and out catheterization with first void after pure wick catheter placement.  (I realize they have done some bladder scanning but based on her history which is corroborated by her caregiver, she is not regularly voiding completely)  Continue cefepime for now  Continue local wound care to right gluteal wound continue  Discussed with patient and her caregiver recommendations to either place indwelling Horan catheter or for training of caregivers regarding In-N-Out catheterization      Maggie Bang MD  06/19/24  10:39 CDT

## 2024-06-19 NOTE — PROGRESS NOTES
[facility] Medicine Services  INPATIENT PROGRESS NOTE    Patient Name: Tawny Shin  Date of Admission: 6/12/2024  Today's Date: 06/19/24  Length of Stay: 0  Primary Care Physician: Moises Oliveira MD    Subjective   Chief Complaint: Recurrent UTIs.   HPI   Blood pressure stable, afebrile.  Patient is on room air.  Patient is to remain extremely weak.  Increasing creatinine noted.  White blood cells normal.  Decrease in hemoglobin.    Review of Systems   Constitutional:  Positive for activity change, appetite change and fatigue. Negative for chills and fever.   HENT:  Negative for hearing loss, nosebleeds, tinnitus and trouble swallowing.    Eyes:  Negative for visual disturbance.   Respiratory:  Negative for cough, chest tightness, shortness of breath and wheezing.    Cardiovascular:  Negative for chest pain, palpitations and leg swelling.   Gastrointestinal:  Negative for abdominal distention, abdominal pain, blood in stool, constipation, diarrhea, nausea and vomiting.   Endocrine: Negative for cold intolerance, heat intolerance, polydipsia, polyphagia and polyuria.   Genitourinary:  Negative for decreased urine volume, difficulty urinating, dysuria, flank pain, frequency and hematuria.   Musculoskeletal:  Positive for arthralgias, gait problem and myalgias. Negative for joint swelling.   Skin:  Negative for rash.   Allergic/Immunologic: Negative for immunocompromised state.   Neurological:  Positive for weakness. Negative for dizziness, syncope, light-headedness and headaches.   Hematological:  Negative for adenopathy. Does not bruise/bleed easily.   Psychiatric/Behavioral:  Negative for confusion and sleep disturbance. The patient is not nervous/anxious.    All pertinent negatives and positives are as above. All other systems have been reviewed and are negative unless otherwise stated.     Objective    Temp:  [97.6 °F (36.4 °C)-98.4 °F (36.9 °C)] 98.1 °F (36.7  °C)  Heart Rate:  [67-87] 77  Resp:  [16-18] 17  BP: (113-140)/(44-51) 124/49  Physical Exam  Vitals and nursing note reviewed.   Constitutional:       Comments: Chronically ill.  Deconditioning.   HENT:      Head: Normocephalic.   Eyes:      Conjunctiva/sclera: Conjunctivae normal.      Pupils: Pupils are equal, round, and reactive to light.   Cardiovascular:      Rate and Rhythm: Normal rate and regular rhythm.      Heart sounds: Normal heart sounds.   Pulmonary:      Effort: Pulmonary effort is normal. No respiratory distress.      Breath sounds: Normal breath sounds.   Abdominal:      General: Bowel sounds are normal. There is no distension.      Palpations: Abdomen is soft.      Tenderness: There is no abdominal tenderness.      Comments: Obesity.   Musculoskeletal:         General: No swelling.      Cervical back: Neck supple.   Skin:     General: Skin is warm and dry.      Capillary Refill: Capillary refill takes 2 to 3 seconds.      Findings: No rash.   Neurological:      Mental Status: She is alert and oriented to person, place, and time.      Motor: Weakness present.      Coordination: Coordination abnormal.      Gait: Gait abnormal.   Psychiatric:         Mood and Affect: Mood normal.         Behavior: Behavior normal.         Thought Content: Thought content normal.          Results Review:  I have reviewed the labs, radiology results, and diagnostic studies.    Laboratory Data:   Results from last 7 days   Lab Units 06/19/24  0122 06/18/24  0418 06/12/24  2112   WBC 10*3/mm3 6.69 6.75 8.62   HEMOGLOBIN g/dL 7.4* 9.7* 8.9*   HEMATOCRIT % 25.3* 32.4* 29.3*   PLATELETS 10*3/mm3 168 174 164        Results from last 7 days   Lab Units 06/19/24  0018 06/18/24  0418 06/13/24  0441 06/12/24  2112   SODIUM mmol/L 135* 136 138 138   POTASSIUM mmol/L 3.7 4.1 4.0 3.7   CHLORIDE mmol/L 101 100 102 100   CO2 mmol/L 26.0 27.0 25.0 26.0   BUN mg/dL 23 23 18 19   CREATININE mg/dL 1.16* 1.08* 1.09* 1.13*   CALCIUM mg/dL  8.3* 8.4* 8.1* 8.5*   BILIRUBIN mg/dL  --   --   --  0.5   ALK PHOS U/L  --   --   --  77   ALT (SGPT) U/L  --   --   --  23   AST (SGOT) U/L  --   --   --  27   GLUCOSE mg/dL 120* 122* 155* 132*       Culture Data:   Blood Culture   Date Value Ref Range Status   06/12/2024 No growth at 5 days  Final   06/12/2024 No growth at 5 days  Final     Urine Culture   Date Value Ref Range Status   06/16/2024 25,000 CFU/mL Enterobacter cloacae complex CRE (A)  Final     Comment:       Consider infectious disease consult.  Susceptibility results may not correlate to clinical outcomes.  Carbapenem resistant Enterobacteriaceae, patient may be an isolation risk.  No carbapenemase detected.   06/12/2024 >100,000 CFU/mL Enterobacter cloacae complex (A)  Final     Comment:       This organism may develop resistance during prolonged therapy with 3rd generation cephalosporins (e.g. ceftriaxone) as a result of de-repression of AmpC B-lactamase.  Ceftriaxone may be a reasonable treatment option for uncomplicated cystitis or other lower severity infections when susceptibility is demonstrated.   06/12/2024 50,000 CFU/mL Mixed Selina Isolated  Final       Radiology Data:   Imaging Results (Last 24 Hours)       ** No results found for the last 24 hours. **            I have reviewed the patient's current medications.     Assessment/Plan   Assessment  Active Hospital Problems    Diagnosis     **Altered mental status     Esophageal dysphagia     UTI (urinary tract infection)     Hypothyroidism     Stage 3b chronic kidney disease     Essential hypertension     Sick sinus syndrome     Venous insufficiency     Rheumatoid arthritis involving multiple sites     Chronic heart failure with preserved ejection fraction     Chronic anticoagulation     Chronic embolism and thrombosis of unspecified deep veins of left lower extremity        Treatment Plan  Altered mental status.  Resolved.  CT scan the head-No acute intracranial abnormality, Chronic  sinusitis and left mastoid effusion.  Chest x-ray-Stable chest exam without acute process, No visualized infiltrate.   Patient is on room air.     UTI.  Cefepime antibiotics.  Continue Hip-Stef.  Repeat UA, negative for bacteria.  Consult ID     Reflux/esophageal dysphagia. Dysphagia recent esophageal stretching, GI evaluated and recommended to continue outpatient follow-up.  SLP evaluated and recommended to continue regular diet.  Protonix . Zofran as needed.     Hypothyroidism.  Synthroid.     Chronic stage IIIb renal failure.  Creatinine slight increase.     Hypomagnesia. Resolved.    Hyponatremia.  Will follow.     Hypertension/sick sinus syndrome/venous insufficiency/CHF.  Eliquis.  Aspirin . Coreg . Lasix.  Imdur.  Cozaar . Aldactone.  Nitro as needed.    Right gluteal wound.  Consult wound care.     Rheumatoid arthritis. Arava.     Depression/anxiety.  Cymbalta.     Postmenopausal.  Estradiol cream.     Anemia.  Decrease in hemoglobin.  Iron sulfate.  Folic acid.  No sign of acute bleed.     Pain . lidocaine patch.  Voltaren gel.  Ultram as needed.     Chronic DVT.     Nutrition.  Cardiac diet.  Vitamin C.  Multivitamins.  Regular/house diet.  Boost supplement.     Deconditioning.  PT and OT consult.  Patient use a walker and bedbound at baseline.     Urine culture-100,000 CFU/mL Enterobacter cloacae complex  25,000 CFU/mL Enterobacter cloacae complex CRE   Blood culture-no growth for 5 days.     Dr. Shin's mom.  Patient already have home health at home.  Patient request for lift at discharge.     Medical Decision Making  Number and Complexity of problems: Recurrent UTI/altered mental status/reflux/rheumatoid arthritis/chronic 3B renal failure  Differential Diagnosis: None     Conditions and Status        Condition is unchanged.     [facility] Data  External documents reviewed: Previous note .  Cardiac tracing (EKG, telemetry) interpretation: Sinus.  Radiology interpretation: X-ray/CT scan of the head  Labs  reviewed: Laboratory  Any tests that were considered but not ordered: Lab in a.m.     Decision rules/scores evaluated (example [id number], Wells, etc): None     Discussed with: Patient and family     Care Planning  Shared decision making: Patient and family  Code status and discussions: DNR     Disposition  Social Determinants of Health that impact treatment or disposition: From home  1 to 3 days.       Electronically signed by Saulo Ambriz MD, 06/19/24, 12:55 CDT.

## 2024-06-19 NOTE — THERAPY TREATMENT NOTE
Acute Care - Physical Therapy Treatment Note  [facility]     Patient Name: Tawny Shin  : 1955  MRN: [id number]  Today's Date: 2024      Visit Dx:     ICD-10-CM ICD-9-CM   1. Acute UTI (urinary tract infection)  N39.0 599.0   2. Altered mental status, unspecified altered mental status type  R41.82 780.97   3. Open wound of right buttock, sequela  S31.819S 906.0   4. Cyst of buttocks  L72.9 706.2   5. Dysphagia, unspecified type  R13.10 787.20   6. Impaired mobility [Z74.09]  Z74.09 799.89     Patient Active Problem List   Diagnosis    T12 compression fracture, initial encounter    Chronic embolism and thrombosis of unspecified deep veins of left lower extremity    Chronic anticoagulation    Iron deficiency anemia    Osteoporosis    E coli bacteremia    Epidural hematoma    Pleural effusion, left    Functional neurological symptom disorder with weakness or paralysis    Class 1 obesity due to excess calories with serious comorbidity and body mass index (BMI) of 33.0 to 33.9 in adult    Rheumatoid arthritis involving multiple sites    Chronic heart failure with preserved ejection fraction    Venous insufficiency    Coronary artery disease involving native coronary artery of native heart without angina pectoris    Sick sinus syndrome    Essential hypertension    Pressure injury of skin of heel    Chronic pain    Anemia of chronic disease    Cholelithiasis    Pressure injury of skin of buttock    Near functional paraplegia    Skin cancer    Squamous cell carcinoma of back    Hematoma    Right-sided chest wall pain    Hyperlipidemia LDL goal <70    Malodorous urine    Stage 3b chronic kidney disease    Cyst of buttocks    Sepsis due to Escherichia coli without acute organ dysfunction    Generalized weakness    Paralysis    History of urinary retention    Bacteremia due to Enterobacter species    Bacteremia    Hypothyroidism    Abnormal CT of the abdomen    Presence of cardiac pacemaker    Altered mental  "status    UTI (urinary tract infection)    Esophageal dysphagia     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    Elevated cholesterol     Eustachian tube dysfunction     Heart disease     Herpes simplex     History of transfusion     Hyperlipidemia     Hypertension     Hypothyroidism 12/23/2020    Intrinsic asthma 12/23/2020    Knee dislocation     Labral tear of right hip joint     Laryngitis sicca     Laryngitis, chronic     Left carotid bruit 03/09/2016    MI (myocardial infarction)     Myalgia due to statin 06/25/2019    Open wound of right hip 09/14/2021    Osteomyelitis of right femur 07/06/2021    Otorrhea     Pacemaker 11/17/2016    Primary osteoarthritis of left knee 12/23/2020    Psoriasis vulgaris 12/23/2020    S/P coronary artery stent placement 03/09/2016    Sensorineural hearing loss     Seropositive rheumatoid arthritis of multiple sites 12/27/2019    Overview:  -myochrysine '93-'96 -methotrexate '96--->11/98;r/s  restarted 2/99--> 8/14 (anemia) -sulfasalazine- not effective -penicillamine 6/98-->10/98; no effect -leflunomide 11/98--> - Humira '13-->didn't take - Enbrel 12/14-->3/15- no effect!   Last Assessment & Plan:  - \"aching all over\" because she had to be off her anti-rheumatic drugs for 2 weeks in preparation for her R knee surgery - he    Sick sinus syndrome 12/27/2019    Sjogren's disease     Spondylolisthesis of lumbar region 01/17/2018    Syncope, recurrent 02/08/2021    " Urinary tract infection     UTI (urinary tract infection) 04/19/2024     Past Surgical History:   Procedure Laterality Date    A-V CARDIAC PACEMAKER INSERTION  2016    ATRIAL CARDIAC PACEMAKER INSERTION      CARDIAC CATHETERIZATION      CATARACT EXTRACTION      CERVICAL CORPECTOMY N/A 3/3/2021    Procedure: CERVICAL 6 CORPECTOMY WITH TITANIUM CAGE WITH NEURO MONITORING;  Surgeon: Bandar Shea MD;  Location:  [facility] OR;  Service: Neurosurgery;  Laterality: N/A;    COLONOSCOPY  11/08/2011    One fold in the ascending colon which showed ulcer otherwise normal exam    COLONOSCOPY  11/12/2004    Normal exam repeat in five years    CORONARY ANGIOPLASTY WITH STENT PLACEMENT      X 2; 2013 & 2014    ENDOSCOPY  07/10/2014    Normal exam    ENDOSCOPY N/A 5/30/2024    Procedure: ESOPHAGOGASTRODUODENOSCOPY WITH ANESTHESIA;  Surgeon: Taiwo Sanchez MD;  Location: [facility] ENDOSCOPY;  Service: Gastroenterology;  Laterality: N/A;  preop; dysphagia  postop: balloon dilation  PCP Jesus Manuel jeff    FLAP LEG Right 9/14/2021    Procedure: RIGHT GLUTEAL FASCIOCUTANEOUS ADVANCEMENT FLAP AND RIGHT TENSOR FASCIAL JESSICA FLAP;  Surgeon: Amadeo Turner MD;  Location:  [facility] OR;  Service: Plastics;  Laterality: Right;    HIP ABDUCTION TENOTOMY BILATERAL Right 1/14/2021    Procedure: RIGHT HIP GLUTEUS MEDLUS / MINIMUS REPAIR, POSSIBLE ACHILLES ALLOGRAFT;  Surgeon: Nino Carlson MD;  Location:  [facility] OR;  Service: Orthopedics;  Laterality: Right;    INCISION AND DRAINAGE ABSCESS Right 6/4/2022    Procedure: INCISION AND DRAINAGE ABSCESS right hip;  Surgeon: Magda Salcido MD;  Location:  [facility] OR;  Service: General;  Laterality: Right;    INCISION AND DRAINAGE ABSCESS Right 6/10/2022    Procedure: RIGHT HIP INCISION AND DRAINAGE. MD NEEDS 3L VANC IRRIGATION, CURRETTES, DAICANS, KERLEX ROLLS;  Surgeon: Amadeo Turner MD;  Location:  [facility] OR;  Service: Plastics;  Laterality: Right;    INCISION AND DRAINAGE HIP Right 2/9/2021     Procedure: HIP INCISION AND DRAINAGE;  Surgeon: Nino Carlson MD;  Location:  [facility] OR;  Service: Orthopedics;  Laterality: Right;    INCISION AND DRAINAGE LEG Right 10/24/2021    Procedure: INCISION AND DRAINAGE LOWER EXTREMITY;  Surgeon: Amadeo Turner MD;  Location:  [facility] OR;  Service: Plastics;  Laterality: Right;    INCISION AND DRAINAGE OF WOUND Right 7/8/2021    Procedure: INCISION AND DRAINAGE WOUND RIGHT HIP;  Surgeon: James Huntley MD;  Location:  [facility] OR;  Service: Orthopedics;  Laterality: Right;    JOINT REPLACEMENT      KYPHOPLASTY WITH BIOPSY Bilateral 10/26/2021    Procedure: THOARCIC 12 KYPHOPLASTY WITH BIOPSY;  Surgeon: Bandar Shea MD;  Location:  [facility] OR;  Service: Neurosurgery;  Laterality: Bilateral;    LEG DEBRIDEMENT Right 9/14/2021    Procedure: DEBRIDEMENT OF RIGHT HIP WOUND, RIGHT GLUTEAL FASCIOCUTANEOUS ADVANCEMENT FLAP AND RIGHT TENSOR FASCIAL JESSICA FLAP;  Surgeon: Amadeo Turner MD;  Location:  [facility] OR;  Service: Plastics;  Laterality: Right;    LUMBAR DISCECTOMY Right 3/23/2021    Procedure: LUMBAR DISCECTOMY MICRO, Lumbar 1/2 right;  Surgeon: Bandar Shea MD;  Location:  [facility] OR;  Service: Neurosurgery;  Laterality: Right;    LUMBAR FUSION N/A 1/19/2018    Procedure: L3-4,L4-5 DECOMPRESSION, POSTERIOR SPINAL FUSION WITH INSTRUMENTATION;  Surgeon: Fortino Oropeza MD;  Location: [facility] OR;  Service:     LUMBAR LAMINECTOMY WITH FUSION Left 1/17/2018    Procedure: LEFT L3-4 L4-5 LATERAL LUMBAR INTERBODY FUSION;  Surgeon: Fortino Oropeza MD;  Location:  [facility] OR;  Service:     MASS EXCISION Right 4/23/2024    Procedure: RIGHT BUTTOCK MASS EXCISION;  Surgeon: Moises Keyes MD;  Location:  [facility] OR;  Service: General;  Laterality: Right;    MYRINGOTOMY W/ TUBES  09/04/2014    TUBES NO LONGER IN PLACE    OTHER SURGICAL HISTORY      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 so hardware was removed and spacers were placed    REPLACEMENT TOTAL KNEE Right       PT Assessment (Last 12 Hours)       PT Evaluation and Treatment       [facility] Name 06/19/24 1303 06/19/24 1036       Physical Therapy Time and Intention    Subjective Information no complaints  - --  -    Document Type therapy note (daily note)  - --    Mode of Treatment physical therapy  - --    Session Not Performed -- other (see comments)  -    Comment, Session Not Performed -- just worked with OT, pt requested to check back after lunch  -[facility] Name 06/19/24 1303          General Information    Existing Precautions/Restrictions fall  -AH       Row Name 06/19/24 1303          Pain    Pretreatment Pain Rating 0/10 - no pain  -     Posttreatment Pain Rating 0/10 - no pain  -AH       Row Name 06/19/24 1303          Bed Mobility    Supine-Sit [place] (Bed Mobility) --  CHAIR  -     Sit-Supine [place] (Bed Mobility) moderate assist (50% patient effort);2 person assist;verbal cues  -AH       Row Name 06/19/24 1303          Transfers    Transfers chair-bed transfer  -AH       Row Name 06/19/24 1303          Chair-Bed Transfer    Chair-Bed [place] (Transfers) minimum assist (75% patient effort);2 person assist;verbal cues  [facility]     Assistive Device (Chair-Bed Transfers) walker, front-wheeled  -AH       Row Name 06/19/24 1303          Sit-Stand Transfer    Sit-Stand [place] (Transfers) minimum assist (75% patient effort);moderate assist (50% patient effort);2 person assist;verbal cues  -[facility] Name 06/19/24 1303          Stand-Sit Transfer    Stand-Sit [place] (Transfers) minimum assist (75% patient effort);2 person assist;verbal cues  -[facility] Name             Wound 04/23/24 0936 Right gluteal Incision    Wound - Properties Group Placement Date: 04/23/24  -EP Placement Time: 0936  -EP Present on Original Admission: N  -EP Side: Right  -EP Location: gluteal  -EP Primary Wound Type: Incision  -EP    Retired Wound - Properties Group Placement Date: 04/23/24  -EP  Placement Time: 0936  -EP Present on Original Admission: N  -EP Side: Right  -EP Location: gluteal  -EP Primary Wound Type: Incision  -EP    Retired Wound - Properties Group Date first assessed: 04/23/24  -EP Time first assessed: 0936  -EP Present on Original Admission: N  -EP Side: Right  -EP Location: gluteal  -EP Primary Wound Type: Incision  -EP      Row Name 06/19/24 1303          Positioning and Restraints    Pre-Treatment Position sitting in chair/recliner  -AH     Post Treatment Position bed  -AH     In Bed fowlers;call light within reach;encouraged to call for assist  -               User Key  (r) = Recorded By, (t) = Taken By, (c) = Cosigned By      Initials Name Provider Type     Olga Chambers PTA Physical Therapist Assistant    Sonny Thompson RN Registered Nurse                    Physical Therapy Education       Title: PT OT SLP Therapies (In Progress)       Topic: Physical Therapy (Done)       Point: Mobility training (Done)       Learning Progress Summary             Patient Acceptance, E, VU by MS at 6/18/2024 1502    Comment: role of PT in her care                         Point: Home exercise program (Done)       Learning Progress Summary             Patient Acceptance, E,D,TB, VU,DU,NR by  at 6/15/2024 1514    Acceptance, E,TB,D, VU,DU,NR by  at 6/14/2024 1550                         Point: Body mechanics (Done)       Learning Progress Summary             Patient Acceptance, E,D,TB, VU,DU,NR by  at 6/15/2024 1514    Acceptance, E,TB,D, VU,DU,NR by  at 6/14/2024 1550                         Point: Precautions (Done)       Learning Progress Summary             Patient Acceptance, E,D,TB, VU,DU,NR by  at 6/15/2024 1514    Acceptance, E,TB,D, VU,DU,NR by  at 6/14/2024 1550                                         User Key       Initials Effective Dates Name Provider Type Discipline    MS 07/11/23 -  Nicole Olson, PT, DPT, NCS Physical Therapist PT     10/17/23 -  Lavon  Stephanie RN Registered Nurse Nurse                  PT Recommendation and Plan         Outcome Measures       Row Name 06/19/24 1300 06/18/24 1400 06/17/24 1500       How much help from another person do you currently need...    Turning from your back to your side while in flat bed without using bedrails? 3  -AH -- --    Moving from lying on back to sitting on the side of a flat bed without bedrails? 3  -AH -- --    Moving to and from a bed to a chair (including a wheelchair)? 2  -AH -- --    Standing up from a chair using your arms (e.g., wheelchair, bedside chair)? 2  -AH -- --    Climbing 3-5 steps with a railing? 1  -AH -- --    To walk in hospital room? 1  -AH -- --    AM-PAC 6 Clicks Score (PT) 12  -AH -- --    Highest Level of Mobility Goal 4 --> Transfer to chair/commode  -AH -- --       How much help from another is currently needed...    Putting on and taking off regular lower body clothing? -- 1  -TS 1  -EC    Bathing (including washing, rinsing, and drying) -- 2  -TS 2  -EC    Toileting (which includes using toilet bed pan or urinal) -- 2  -TS 2  -EC    Putting on and taking off regular upper body clothing -- 3  -TS 3  -EC    Taking care of personal grooming (such as brushing teeth) -- 3  -TS 3  -EC    Eating meals -- 4  -TS 4  -EC    AM-PAC 6 Clicks Score (OT) -- 15  -TS 15  -EC       Functional Assessment    Outcome Measure Options AM-PAC 6 Clicks Basic Mobility (PT)  - -- AM-PAC 6 Clicks Daily Activity (OT)  -EC              User Key  (r) = Recorded By, (t) = Taken By, (c) = Cosigned By      Initials Name Provider Type     Olga Chambers, SERENA Physical Therapist Assistant    Carolee Roger COTA Occupational Therapist Assistant    Ashley Nash, OTR/L Occupational Therapist                     Time Calculation:    PT Charges       Row Name 06/19/24 1353             Time Calculation    Start Time 1303  -      Stop Time 1326  -      Time Calculation (min) 23 min  -      PT Received  On 06/19/24  -         Time Calculation- PT    Total Timed Code Minutes- PT 23 minute(s)  -AH         Timed Charges    77567 - PT Therapeutic Activity Minutes 23  -AH         Total Minutes    Timed Charges Total Minutes 23  -AH       Total Minutes 23  -AH                User Key  (r) = Recorded By, (t) = Taken By, (c) = Cosigned By      Initials Name Provider Type     Olga Chambers PTA Physical Therapist Assistant                  Therapy Charges for Today       Code Description Service Date Service Provider Modifiers Qty    [id number]  PT THERAPEUTIC ACT EA 15 MIN 6/19/2024 Olga Chambers PTA GP 2            PT G-Codes  Outcome Measure Options: AM-PAC 6 Clicks Basic Mobility (PT)  AM-PAC 6 Clicks Score (PT): 12  AM-PAC 6 Clicks Score (OT): 15    Olga Chambers PTA  6/19/2024

## 2024-06-19 NOTE — PLAN OF CARE
Goal Outcome Evaluation:         Pt is Aox4 and afebrile. Pain controlled with PRN pain meds. External cath D/C'd per MD and pt was in and out cathed and 80mL was drained. VSS and on room air.

## 2024-06-19 NOTE — PLAN OF CARE
Goal Outcome Evaluation:  Plan of Care Reviewed With: patient, caregiver        Progress: improving  Outcome Evaluation: pt in chair, trans sit-stand min-mod 2, took few steps chair-bed with rwx min 2, trans back to bed mod 2, BLE HEP A-AAROM 10 x 2 reps,

## 2024-06-19 NOTE — PLAN OF CARE
Goal Outcome Evaluation:  Plan of Care Reviewed With: patient, caregiver        Progress: improving  Outcome Evaluation: OT tx completed. Pt in fowlers upon therapist arrival; A&Ox3; No c/o pain at rest; Caregiver present. Pt performed supine>sit utilizing bedrail with HOB elevated with Mod A and verbal cues for sequencing and body mechanics. Pt performed sit<>stand and took a few steps from bed>chair utilizing rwx with Min-Mod A x2; Pt requires increased assistance when stepping backward due to great difficulty advancing BLE backward. Pt continues to benefit from skilled OT intervention in order to address remaining deficits in fxl mobility, fxl activity tolerance, balance, strength, and use of adaptive techniques/equipment during performance of BADLs. Recommend home with 24/7 care and HH at discharge.      Anticipated Discharge Disposition (OT): home with 24/7 care, home with home health

## 2024-06-20 ENCOUNTER — APPOINTMENT (OUTPATIENT)
Dept: CT IMAGING | Facility: HOSPITAL | Age: 69
DRG: 659 | End: 2024-06-20
Payer: MEDICARE

## 2024-06-20 PROBLEM — N13.2 HYDRONEPHROSIS WITH URINARY OBSTRUCTION DUE TO URETERAL CALCULUS: Status: ACTIVE | Noted: 2024-06-12

## 2024-06-20 LAB
ANION GAP SERPL CALCULATED.3IONS-SCNC: 8 MMOL/L (ref 5–15)
BUN SERPL-MCNC: 22 MG/DL (ref 8–23)
BUN/CREAT SERPL: 20.6 (ref 7–25)
CALCIUM SPEC-SCNC: 8.6 MG/DL (ref 8.6–10.5)
CHLORIDE SERPL-SCNC: 97 MMOL/L (ref 98–107)
CO2 SERPL-SCNC: 28 MMOL/L (ref 22–29)
CREAT SERPL-MCNC: 1.07 MG/DL (ref 0.57–1)
DEPRECATED RDW RBC AUTO: 62.2 FL (ref 37–54)
EGFRCR SERPLBLD CKD-EPI 2021: 56 ML/MIN/1.73
ERYTHROCYTE [DISTWIDTH] IN BLOOD BY AUTOMATED COUNT: 18.5 % (ref 12.3–15.4)
GLUCOSE SERPL-MCNC: 97 MG/DL (ref 65–99)
HCT VFR BLD AUTO: 25.2 % (ref 34–46.6)
HGB BLD-MCNC: 7.5 G/DL (ref 12–15.9)
MAGNESIUM SERPL-MCNC: 1.7 MG/DL (ref 1.6–2.4)
MCH RBC QN AUTO: 28 PG (ref 26.6–33)
MCHC RBC AUTO-ENTMCNC: 29.8 G/DL (ref 31.5–35.7)
MCV RBC AUTO: 94 FL (ref 79–97)
PLATELET # BLD AUTO: 192 10*3/MM3 (ref 140–450)
PMV BLD AUTO: 12 FL (ref 6–12)
POTASSIUM SERPL-SCNC: 3.4 MMOL/L (ref 3.5–5.2)
RBC # BLD AUTO: 2.68 10*6/MM3 (ref 3.77–5.28)
SODIUM SERPL-SCNC: 133 MMOL/L (ref 136–145)
WBC NRBC COR # BLD AUTO: 6.32 10*3/MM3 (ref 3.4–10.8)

## 2024-06-20 PROCEDURE — G0378 HOSPITAL OBSERVATION PER HR: HCPCS

## 2024-06-20 PROCEDURE — 80048 BASIC METABOLIC PNL TOTAL CA: CPT | Performed by: FAMILY MEDICINE

## 2024-06-20 PROCEDURE — 83735 ASSAY OF MAGNESIUM: CPT | Performed by: FAMILY MEDICINE

## 2024-06-20 PROCEDURE — 97110 THERAPEUTIC EXERCISES: CPT

## 2024-06-20 PROCEDURE — 97535 SELF CARE MNGMENT TRAINING: CPT

## 2024-06-20 PROCEDURE — 97530 THERAPEUTIC ACTIVITIES: CPT

## 2024-06-20 PROCEDURE — 99223 1ST HOSP IP/OBS HIGH 75: CPT | Performed by: UROLOGY

## 2024-06-20 PROCEDURE — 25010000002 CEFEPIME PER 500 MG: Performed by: FAMILY MEDICINE

## 2024-06-20 PROCEDURE — 99214 OFFICE O/P EST MOD 30 MIN: CPT | Performed by: INTERNAL MEDICINE

## 2024-06-20 PROCEDURE — 85027 COMPLETE CBC AUTOMATED: CPT | Performed by: FAMILY MEDICINE

## 2024-06-20 PROCEDURE — 74176 CT ABD & PELVIS W/O CONTRAST: CPT

## 2024-06-20 RX ORDER — POTASSIUM CHLORIDE 750 MG/1
40 CAPSULE, EXTENDED RELEASE ORAL ONCE
Status: COMPLETED | OUTPATIENT
Start: 2024-06-20 | End: 2024-06-20

## 2024-06-20 RX ADMIN — CEFEPIME 2000 MG: 2 INJECTION, POWDER, FOR SOLUTION INTRAVENOUS at 23:40

## 2024-06-20 RX ADMIN — APIXABAN 5 MG: 5 TABLET, FILM COATED ORAL at 20:12

## 2024-06-20 RX ADMIN — CEFEPIME 2000 MG: 2 INJECTION, POWDER, FOR SOLUTION INTRAVENOUS at 13:15

## 2024-06-20 RX ADMIN — FOLIC ACID 1000 MCG: 1 TABLET ORAL at 09:58

## 2024-06-20 RX ADMIN — TRAMADOL HYDROCHLORIDE 50 MG: 50 TABLET ORAL at 20:12

## 2024-06-20 RX ADMIN — CARVEDILOL 25 MG: 25 TABLET, FILM COATED ORAL at 09:59

## 2024-06-20 RX ADMIN — LEFLUNOMIDE 20 MG: 20 TABLET ORAL at 09:58

## 2024-06-20 RX ADMIN — PANTOPRAZOLE SODIUM 40 MG: 40 TABLET, DELAYED RELEASE ORAL at 09:58

## 2024-06-20 RX ADMIN — LEVOTHYROXINE SODIUM 75 MCG: 0.07 TABLET ORAL at 06:13

## 2024-06-20 RX ADMIN — METHENAMINE HIPPURATE 1 G: 1000 TABLET ORAL at 10:00

## 2024-06-20 RX ADMIN — ISOSORBIDE MONONITRATE 60 MG: 60 TABLET, EXTENDED RELEASE ORAL at 09:58

## 2024-06-20 RX ADMIN — DULOXETINE HYDROCHLORIDE 60 MG: 30 CAPSULE, DELAYED RELEASE ORAL at 09:58

## 2024-06-20 RX ADMIN — FUROSEMIDE 40 MG: 40 TABLET ORAL at 09:58

## 2024-06-20 RX ADMIN — METHENAMINE HIPPURATE 1 G: 1000 TABLET ORAL at 20:12

## 2024-06-20 RX ADMIN — FERROUS SULFATE TAB 325 MG (65 MG ELEMENTAL FE) 325 MG: 325 (65 FE) TAB at 09:59

## 2024-06-20 RX ADMIN — Medication 1 TABLET: at 09:59

## 2024-06-20 RX ADMIN — LOSARTAN POTASSIUM 50 MG: 50 TABLET, FILM COATED ORAL at 09:58

## 2024-06-20 RX ADMIN — ESTRADIOL 2 APPLICATOR: 0.1 CREAM VAGINAL at 10:00

## 2024-06-20 RX ADMIN — OXYCODONE HYDROCHLORIDE AND ACETAMINOPHEN 500 MG: 500 TABLET ORAL at 09:59

## 2024-06-20 RX ADMIN — Medication 10 ML: at 10:29

## 2024-06-20 RX ADMIN — APIXABAN 5 MG: 5 TABLET, FILM COATED ORAL at 09:59

## 2024-06-20 RX ADMIN — POTASSIUM CHLORIDE 40 MEQ: 750 CAPSULE, EXTENDED RELEASE ORAL at 09:58

## 2024-06-20 RX ADMIN — ASPIRIN 81 MG: 81 TABLET, COATED ORAL at 09:58

## 2024-06-20 RX ADMIN — Medication 10 ML: at 20:13

## 2024-06-20 RX ADMIN — SPIRONOLACTONE 25 MG: 25 TABLET ORAL at 09:58

## 2024-06-20 NOTE — PROGRESS NOTES
[facility] Medicine Services  INPATIENT PROGRESS NOTE    Patient Name: Tawny Shin  Date of Admission: 6/12/2024  Today's Date: 06/20/24  Length of Stay: 0  Primary Care Physician: Moises Oliveira MD    Subjective   Chief Complaint: Recurrent UTI  HPI   Urology was consulted for possible neurogenic bladder.  Recommended to order CT scan of abdomen pelvis without contrast stone protocol.  Patient is on room air.  Sodium slight decrease.  Potassium is low, p.o. potassium.  Magnesium is normal.  Creatinine is improving.  Hemoglobin stable.  White blood cells normal.    Review of Systems   Constitutional:  Positive for activity change, appetite change and fatigue. Negative for chills and fever.   HENT:  Negative for hearing loss, nosebleeds, tinnitus and trouble swallowing.    Eyes:  Negative for visual disturbance.   Respiratory:  Negative for cough, chest tightness, shortness of breath and wheezing.    Cardiovascular:  Negative for chest pain, palpitations and leg swelling.   Gastrointestinal:  Negative for abdominal distention, abdominal pain, blood in stool, constipation, diarrhea, nausea and vomiting.   Endocrine: Negative for cold intolerance, heat intolerance, polydipsia, polyphagia and polyuria.   Genitourinary:  Negative for decreased urine volume, difficulty urinating, dysuria, flank pain, frequency and hematuria.   Musculoskeletal:  Positive for arthralgias, gait problem and myalgias. Negative for joint swelling.   Skin:  Negative for rash.   Allergic/Immunologic: Negative for immunocompromised state.   Neurological:  Positive for weakness. Negative for dizziness, syncope, light-headedness and headaches.   Hematological:  Negative for adenopathy. Does not bruise/bleed easily.   Psychiatric/Behavioral:  Negative for confusion and sleep disturbance. The patient is not nervous/anxious.    All pertinent negatives and positives are as above. All other systems have been  reviewed and are negative unless otherwise stated.     Objective    Temp:  [97.4 °F (36.3 °C)-98.2 °F (36.8 °C)] 97.9 °F (36.6 °C)  Heart Rate:  [72-83] 83  Resp:  [16] 16  BP: (114-123)/(40-58) 117/58  Physical Exam  Vitals and nursing note reviewed.   Constitutional:       Comments: Chronically ill.  Deconditioning.   HENT:      Head: Normocephalic.   Eyes:      Conjunctiva/sclera: Conjunctivae normal.      Pupils: Pupils are equal, round, and reactive to light.   Cardiovascular:      Rate and Rhythm: Normal rate and regular rhythm.      Heart sounds: Normal heart sounds.   Pulmonary:      Effort: Pulmonary effort is normal. No respiratory distress.      Breath sounds: Normal breath sounds.   Abdominal:      General: Bowel sounds are normal. There is no distension.      Palpations: Abdomen is soft.      Tenderness: There is no abdominal tenderness.      Comments: Obesity.   Musculoskeletal:         General: No swelling.      Cervical back: Neck supple.   Skin:     General: Skin is warm and dry.      Capillary Refill: Capillary refill takes 2 to 3 seconds.      Findings: No rash.   Neurological:      Mental Status: She is alert and oriented to person, place, and time.      Motor: Weakness present.      Coordination: Coordination abnormal.      Gait: Gait abnormal.   Psychiatric:         Mood and Affect: Mood normal.         Behavior: Behavior normal.         Thought Content: Thought content normal.       Results Review:  I have reviewed the labs, radiology results, and diagnostic studies.    Laboratory Data:   Results from last 7 days   Lab Units 06/20/24  0354 06/19/24  0122 06/18/24  0418   WBC 10*3/mm3 6.32 6.69 6.75   HEMOGLOBIN g/dL 7.5* 7.4* 9.7*   HEMATOCRIT % 25.2* 25.3* 32.4*   PLATELETS 10*3/mm3 192 168 174        Results from last 7 days   Lab Units 06/20/24  0354 06/19/24  0018 06/18/24  0418   SODIUM mmol/L 133* 135* 136   POTASSIUM mmol/L 3.4* 3.7 4.1   CHLORIDE mmol/L 97* 101 100   CO2 mmol/L 28.0  26.0 27.0   BUN mg/dL 22 23 23   CREATININE mg/dL 1.07* 1.16* 1.08*   CALCIUM mg/dL 8.6 8.3* 8.4*   GLUCOSE mg/dL 97 120* 122*       Culture Data:   Urine Culture   Date Value Ref Range Status   06/16/2024 25,000 CFU/mL Enterobacter cloacae complex CRE (A)  Final     Comment:       Consider infectious disease consult.  Susceptibility results may not correlate to clinical outcomes.  Carbapenem resistant Enterobacteriaceae, patient may be an isolation risk.  No carbapenemase detected.       Radiology Data:   Imaging Results (Last 24 Hours)       ** No results found for the last 24 hours. **            I have reviewed the patient's current medications.     Assessment/Plan   Assessment  Active Hospital Problems    Diagnosis     **Altered mental status     Esophageal dysphagia     UTI (urinary tract infection)     Hypothyroidism     Stage 3b chronic kidney disease     Essential hypertension     Sick sinus syndrome     Venous insufficiency     Rheumatoid arthritis involving multiple sites     Chronic heart failure with preserved ejection fraction     Chronic anticoagulation     Chronic embolism and thrombosis of unspecified deep veins of left lower extremity        Treatment Plan  Altered mental status.  Resolved.  CT scan the head-No acute intracranial abnormality, Chronic sinusitis and left mastoid effusion.  Chest x-ray-Stable chest exam without acute process, No visualized infiltrate.   Patient is on room air.     Recurrent UTI/questionable neurogenic bladder.  Cefepime antibiotics.  Continue Hip-Stef.  Repeat UA, negative for bacteria.  Consult ID.  Urology has been consulted by ID for evaluation of neurogenic bladder.    Will get a CT scan of abdomen pelvic without contrast due to renal failure with stone protocol.     Reflux/esophageal dysphagia. Dysphagia recent esophageal stretching, GI evaluated and recommended to continue outpatient follow-up.  SLP evaluated and recommended to continue regular diet.  Protonix .  Zofran as needed.     Hypothyroidism.  Synthroid.     Chronic stage IIIb renal failure.  Creatinine slight decrease.     Hypomagnesia. Resolved.     Hyponatremia.  Slight decrease in sodium.    Hypokalemia.  P.o. potassium.  Magnesium 1.7.     Hypertension/sick sinus syndrome/venous insufficiency/CHF.  Eliquis.  Aspirin . Coreg . Lasix.  Imdur.  Cozaar . Aldactone.  Nitro as needed.     Right gluteal wound.  Consult wound care.     Rheumatoid arthritis. Arava.     Depression/anxiety.  Cymbalta.     Postmenopausal.  Estradiol cream.     Anemia.  Hemoglobin stable.  Iron sulfate.  Folic acid.  No sign of acute bleed.     Pain . lidocaine patch.  Voltaren gel.  Ultram as needed.     Chronic DVT.     Nutrition.  Cardiac diet.  Vitamin C.  Multivitamins.  Regular/house diet.  Boost supplement.     Deconditioning.  PT and OT consult.  Patient use a walker and bedbound at baseline.     Urine culture-100,000 CFU/mL Enterobacter cloacae complex  25,000 CFU/mL Enterobacter cloacae complex CRE   Blood culture-no growth for 5 days.     Dr. Shin's mom.  Patient already have home health at home.  Patient request for lift at discharge.     Medical Decision Making  Number and Complexity of problems: Recurrent UTI/altered mental status/reflux/rheumatoid arthritis/chronic 3B renal failure  Differential Diagnosis: None     Conditions and Status        Condition is unchanged.     [facility] Data  External documents reviewed: Previous note .  Cardiac tracing (EKG, telemetry) interpretation: Sinus.  Radiology interpretation: X-ray/CT scan of the head  Labs reviewed: Laboratory  Any tests that were considered but not ordered: Lab in a.m.     Decision rules/scores evaluated (example [id number], Wells, etc): None     Discussed with: Patient and family     Care Planning  Shared decision making: Patient and family  Code status and discussions: DNR     Disposition  Social Determinants of Health that impact treatment or disposition: From home  1  to 3 days.    Electronically signed by Saulo Ambriz MD, 06/20/24, 11:41 CDT.

## 2024-06-20 NOTE — PLAN OF CARE
Goal Outcome Evaluation:  Plan of Care Reviewed With: patient        Progress: no change  Outcome Evaluation: Pt. agreeable to therapy. Pt. needing to use BR and MD as requested she attempt to use BSC. Pt. needed Min-Mod assist to get to EOB. She was MIN x 2 to stand and took small steps to BSC with RWX. Pt. unable to urinate while up on BSC. She had already gone in brief prior to getting up. Pt. stood again with MIN x 2, but did need Mod x 2 to take steps to chair ( 3'). Left knee buckling during transfer to chair. Unable to place R heel flat on floor. Pt. left resting in recliner with caregiver present. Will continue to work with pt on transfers while she is here.

## 2024-06-20 NOTE — PLAN OF CARE
Goal Outcome Evaluation:  Plan of Care Reviewed With: patient, caregiver, other (see comments), family (sitter)        Progress: no change  Outcome Evaluation: RDN follow up. Pt reports appetite was good. Pt has stage III gluteal wound. Pt has been receiving Boost Original daily at breakfast. Start Mirza BID mixed in water; increase Boost Original to BID. Encouraged oral intake. Po intake 56% of four meal (6/17,6/18); no po intake documented for 6/19-20. Cont to follow for plan of care.

## 2024-06-20 NOTE — PLAN OF CARE
Goal Outcome Evaluation:  Plan of Care Reviewed With: patient, caregiver   Pt. A&Ox4, VSS, pt had CT abdomen and pelvis this shift, IV antibx's infused, pt up to chair with OT today, urology consult, NPO after midnight for lithotripsy tomorrow, consent signed, caregiver at bedside, safety maintained, plan of care ongoing.

## 2024-06-20 NOTE — CONSULTS
Urology    Ms. Shin is 68 y.o. female    REASON FOR CONSULT/CHIEF COMPLAINT: Recurrent UTI    HPI  Patient seen by me multiple times in the past for neurogenic bladder issues to patient from injury.  She gets recurrent urinary tract infections from multiresistant organisms.  She is spent most of her time since the injury with either a catheter or an intermittent catheterization.  At times though she has been able to spontaneously void.  On the day of this admission she was having clear mental status changes.  In fact her son is a neurologist who evaluated her and called an ambulance.  It is felt that she encephalopathy secondary to urinary tract source.  I am asked to see her about etiology of these recurring UTIs.        The following portions of the patient's history were reviewed and updated as appropriate: allergies, current medications, past family history, past medical history, past social history, past surgical history and problem list.    Review of Systems   Constitutional:  Positive for fatigue. Negative for diaphoresis and fever.   Gastrointestinal:  Positive for abdominal distention, abdominal pain and constipation.   Genitourinary:  Positive for urgency.   Musculoskeletal:  Positive for back pain and myalgias.   Psychiatric/Behavioral:  Positive for confusion.        Medications Prior to Admission   Medication Sig Dispense Refill Last Dose    acetaminophen (TYLENOL) 325 MG tablet Take 2 tablets by mouth Every 6 (Six) Hours As Needed for Mild Pain (mild pain). Indications: Fever, Pain   Past Week    apixaban (Eliquis) 5 MG tablet tablet Take 1 tablet by mouth 2 (Two) Times a Day.   Past Week    Ascorbic Acid (Vitamin C) 500 MG capsule Take 1 tablet by mouth Daily. Indications: Inadequate Vitamin C   Past Week    aspirin 81 MG EC tablet Take 1 tablet by mouth Daily. Indications: Buildup of Plaques in Large Arteries Leading to the Brain   Past Week    carvedilol (COREG) 25 MG tablet Take 1 tablet by  mouth 2 (Two) Times a Day With Meals. Indications: High Blood Pressure Disorder   Past Week    Diclofenac Sodium (VOLTAREN) 1 % gel gel Apply 4 g topically to the appropriate area as directed 4 (Four) Times a Day As Needed (Mild pain). Indications: Joint Damage causing Pain and Loss of Function   Past Week    DULoxetine (CYMBALTA) 60 MG capsule Take 1 capsule by mouth Daily. Indications: Musculoskeletal Pain   Past Week    estradiol (ESTRACE) 0.1 MG/GM vaginal cream Insert 2 g into the vagina Daily. 42.5 g 0 Past Week    ferrous sulfate 324 (65 Fe) MG tablet delayed-release EC tablet Take 1 tablet by mouth Daily With Breakfast. Indications: Iron Deficiency   Past Week    folic acid (FOLVITE) 1 MG tablet Take 1 tablet by mouth Daily. Indications: Anemia From Inadequate Folic Acid   Past Week    furosemide (LASIX) 40 MG tablet Take 1 tablet by mouth Daily. Indications: Cardiac Failure, Edema   Past Week    isosorbide mononitrate (IMDUR) 60 MG 24 hr tablet Take 1 tablet by mouth Daily. Indications: Stable Angina Pectoris   Past Week    leflunomide (ARAVA) 20 MG tablet Take 1 tablet by mouth every night at bedtime. Indications: Rheumatoid Arthritis 90 tablet 3 Past Week    levothyroxine (SYNTHROID, LEVOTHROID) 75 MCG tablet Take 1 tablet by mouth Daily. Indications: Underactive Thyroid   Past Week    lidocaine (Lidoderm) 5 % Place 1 patch on the skin as directed by provider Daily As Needed for Mild Pain. Remove & Discard patch within 12 hours or as directed by MD 30 each 2 Past Week    losartan (COZAAR) 50 MG tablet Take 1 tablet by mouth Daily. Indications: High Blood Pressure Disorder   Past Week    methenamine (HIPREX) 1 g tablet Take 1 tablet by mouth 2 (Two) Times a Day With Meals.   Past Week    multivitamin with minerals tablet tablet Take 1 tablet by mouth Daily.   Past Week    pantoprazole (Protonix) 40 MG EC tablet Take 1 tablet by mouth Daily for 90 days. 90 tablet 0 Past Week    predniSONE (DELTASONE) 5 MG  tablet Take 1 tablet by mouth Daily. Indications: Acute Joint Inflammation in Gout   Past Week    spironolactone (ALDACTONE) 25 MG tablet Take 1 tablet by mouth Daily. 30 tablet 11 Past Week    traMADol (ULTRAM) 50 MG tablet Take 1 tablet by mouth Every 12 (Twelve) Hours As Needed for Moderate Pain. Indications: Pain   Past Week    valACYclovir (VALTREX) 500 MG tablet Take 1 tablet by mouth Daily As Needed. Indications: Shingles   Past Week    nitroglycerin (NITROSTAT) 0.4 MG SL tablet Place 1 tablet under the tongue Every 5 (Five) Minutes As Needed for Chest Pain. Take no more than 3 doses in 15 minutes.  Indications: Acute Angina Pectoris   Unknown         Current Facility-Administered Medications:     acetaminophen (TYLENOL) tablet 650 mg, 650 mg, Oral, Q6H PRN, Wes Andrews MD, 650 mg at 06/19/24 1403    apixaban (ELIQUIS) tablet 5 mg, 5 mg, Oral, BID, Wes Andrews MD, 5 mg at 06/20/24 0959    ascorbic acid (VITAMIN C) tablet 500 mg, 500 mg, Oral, Daily, Wes Andrews MD, 500 mg at 06/20/24 0959    aspirin EC tablet 81 mg, 81 mg, Oral, Daily, Wes Andrews MD, 81 mg at 06/20/24 0958    sennosides-docusate (PERICOLACE) 8.6-50 MG per tablet 2 tablet, 2 tablet, Oral, BID PRN **AND** polyethylene glycol (MIRALAX) packet 17 g, 17 g, Oral, Daily PRN, 17 g at 06/15/24 1553 **AND** bisacodyl (DULCOLAX) EC tablet 5 mg, 5 mg, Oral, Daily PRN **AND** bisacodyl (DULCOLAX) suppository 10 mg, 10 mg, Rectal, Daily PRN, Wes Andrews MD    Calcium Replacement - Follow Nurse / BPA Driven Protocol, , Does not apply, PRN, Wes Andrews MD    carvedilol (COREG) tablet 25 mg, 25 mg, Oral, BID With Meals, Wes Andrews MD, 25 mg at 06/20/24 0959    cefepime 2000 mg IVPB in 100 mL NS (MBP), 2,000 mg, Intravenous, Q12H, Saulo Ambriz MD, 2,000 mg at 06/20/24 1315    Diclofenac Sodium (VOLTAREN) 1 % gel 4 g, 4 g, Topical, 4x Daily PRN, Wes Andrews MD    DULoxetine (CYMBALTA)   capsule 60 mg, 60 mg, Oral, Daily, Wes Andrews MD, 60 mg at 06/20/24 0958    estradiol (ESTRACE) vaginal cream 2 applicator, 2 g, Vaginal, Daily, Wes Andrews MD, 2 applicator at 06/20/24 1000    ferrous sulfate tablet 325 mg, 325 mg, Oral, Daily With Breakfast, Wes Andrews MD, 325 mg at 06/20/24 0959    folic acid (FOLVITE) tablet 1,000 mcg, 1,000 mcg, Oral, Daily, Wes Andrews MD, 1,000 mcg at 06/20/24 0958    furosemide (LASIX) tablet 40 mg, 40 mg, Oral, Daily, Wes Andrews MD, 40 mg at 06/20/24 0958    isosorbide mononitrate (IMDUR) 24 hr tablet 60 mg, 60 mg, Oral, Daily, Wes Andrews MD, 60 mg at 06/20/24 0958    leflunomide (ARAVA) tablet 20 mg, 20 mg, Oral, Daily, Wes Andrews MD, 20 mg at 06/20/24 0958    levothyroxine (SYNTHROID, LEVOTHROID) tablet 75 mcg, 75 mcg, Oral, Q AM, Wes Andrews MD, 75 mcg at 06/20/24 0613    Lidocaine 4 % 1 patch, 1 patch, Transdermal, Daily PRN, Wes Andrews MD    losartan (COZAAR) tablet 50 mg, 50 mg, Oral, Daily, Wes Andrews MD, 50 mg at 06/20/24 0958    Magnesium Low Dose Replacement - Follow Nurse / BPA Driven Protocol, , Does not apply, PRN, Wes Andrews MD    methenamine (HIPREX) tablet 1 g, 1 g, Oral, BID, Kris Cade MD, 1 g at 06/20/24 1000    multivitamin with minerals 1 tablet, 1 tablet, Oral, Daily, Wes Andrews MD, 1 tablet at 06/20/24 0959    [DISCONTINUED] Morphine sulfate (PF) injection 1 mg, 1 mg, Intravenous, Q4H PRN **AND** naloxone (NARCAN) injection 0.4 mg, 0.4 mg, Intravenous, Q5 Min PRN, Wes Andrews MD    nitroglycerin (NITROSTAT) SL tablet 0.4 mg, 0.4 mg, Sublingual, Q5 Min PRN, Wes Andrews MD    ondansetron (ZOFRAN) injection 4 mg, 4 mg, Intravenous, Q6H PRN, Wes Andrews MD, 4 mg at 06/18/24 1717    pantoprazole (PROTONIX) EC tablet 40 mg, 40 mg, Oral, Daily, Wes Andrews MD, 40 mg at 06/20/24 0958    Phosphorus Replacement -  Follow Nurse / BPA Driven Protocol, , Does not apply, Juliana PALACIO Emmanuel O, MD    Potassium Replacement - Follow Nurse / BPA Driven Protocol, , Does not apply, Juliana PALACIO Emmanuel O, MD    sodium chloride 0.9 % flush 10 mL, 10 mL, Intravenous, PRN, Calvin Booth DO, 10 mL at 06/12/24 2200    sodium chloride 0.9 % flush 10 mL, 10 mL, Intravenous, Q12H, Wes Andrews MD, 10 mL at 06/20/24 1029    sodium chloride 0.9 % flush 10 mL, 10 mL, Intravenous, PRN, Wes Andrews MD    sodium chloride 0.9 % infusion 40 mL, 40 mL, Intravenous, PRN, Wes Andrews MD    spironolactone (ALDACTONE) tablet 25 mg, 25 mg, Oral, Daily, Wes Andrews MD, 25 mg at 06/20/24 0958    traMADol (ULTRAM) tablet 50 mg, 50 mg, Oral, Q12H PRN, Wes Andrews MD, 50 mg at 06/19/24 2115    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    Elevated cholesterol     Eustachian tube dysfunction     Heart disease     Herpes simplex     History of transfusion     Hyperlipidemia     Hypertension     Hypothyroidism 12/23/2020    Intrinsic asthma 12/23/2020    Knee dislocation     Labral tear of right hip joint     Laryngitis sicca     Laryngitis, chronic     Left carotid bruit 03/09/2016    MI (myocardial infarction)     Myalgia due to statin 06/25/2019    Open wound of right hip 09/14/2021    Osteomyelitis of right femur 07/06/2021    Otorrhea     Pacemaker 11/17/2016    Primary osteoarthritis of left  "knee 12/23/2020    Psoriasis vulgaris 12/23/2020    S/P coronary artery stent placement 03/09/2016    Sensorineural hearing loss     Seropositive rheumatoid arthritis of multiple sites 12/27/2019    Overview:  -myochrysine '93-'96 -methotrexate '96--->11/98;r/s  restarted 2/99--> 8/14 (anemia) -sulfasalazine- not effective -penicillamine 6/98-->10/98; no effect -leflunomide 11/98--> - Humira '13-->didn't take - Enbrel 12/14-->3/15- no effect!   Last Assessment & Plan:  - \"aching all over\" because she had to be off her anti-rheumatic drugs for 2 weeks in preparation for her R knee surgery - he    Sick sinus syndrome 12/27/2019    Sjogren's disease     Spondylolisthesis of lumbar region 01/17/2018    Syncope, recurrent 02/08/2021    Urinary tract infection     UTI (urinary tract infection) 04/19/2024       Past Surgical History:   Procedure Laterality Date    A-V CARDIAC PACEMAKER INSERTION  2016    ATRIAL CARDIAC PACEMAKER INSERTION      CARDIAC CATHETERIZATION      CATARACT EXTRACTION      CERVICAL CORPECTOMY N/A 3/3/2021    Procedure: CERVICAL 6 CORPECTOMY WITH TITANIUM CAGE WITH NEURO MONITORING;  Surgeon: Bandar Shea MD;  Location: [facility] OR;  Service: Neurosurgery;  Laterality: N/A;    COLONOSCOPY  11/08/2011    One fold in the ascending colon which showed ulcer otherwise normal exam    COLONOSCOPY  11/12/2004    Normal exam repeat in five years    CORONARY ANGIOPLASTY WITH STENT PLACEMENT      X 2; 2013 & 2014    ENDOSCOPY  07/10/2014    Normal exam    ENDOSCOPY N/A 5/30/2024    Procedure: ESOPHAGOGASTRODUODENOSCOPY WITH ANESTHESIA;  Surgeon: Taiwo Sanchez MD;  Location:  [facility];  Service: Gastroenterology;  Laterality: N/A;  preop; dysphagia  postop: balloon dilation  PCP Jesus Manuel jeff    FLAP LEG Right 9/14/2021    Procedure: RIGHT GLUTEAL FASCIOCUTANEOUS ADVANCEMENT FLAP AND RIGHT TENSOR FASCIAL JESSICA FLAP;  Surgeon: Amadeo Turner MD;  Location: [facility] OR;  Service: Plastics;  " Laterality: Right;    HIP ABDUCTION TENOTOMY BILATERAL Right 1/14/2021    Procedure: RIGHT HIP GLUTEUS MEDLUS / MINIMUS REPAIR, POSSIBLE ACHILLES ALLOGRAFT;  Surgeon: Nino Carlson MD;  Location:  [facility] OR;  Service: Orthopedics;  Laterality: Right;    INCISION AND DRAINAGE ABSCESS Right 6/4/2022    Procedure: INCISION AND DRAINAGE ABSCESS right hip;  Surgeon: Magda Salcido MD;  Location:  [facility] OR;  Service: General;  Laterality: Right;    INCISION AND DRAINAGE ABSCESS Right 6/10/2022    Procedure: RIGHT HIP INCISION AND DRAINAGE. MD NEEDS 3L VANC IRRIGATION, CURRETTES, DAICANS, KERLEX ROLLS;  Surgeon: Amadeo Turner MD;  Location:  [facility] OR;  Service: Plastics;  Laterality: Right;    INCISION AND DRAINAGE HIP Right 2/9/2021    Procedure: HIP INCISION AND DRAINAGE;  Surgeon: Nino Carlson MD;  Location:  [facility] OR;  Service: Orthopedics;  Laterality: Right;    INCISION AND DRAINAGE LEG Right 10/24/2021    Procedure: INCISION AND DRAINAGE LOWER EXTREMITY;  Surgeon: Amadeo Turner MD;  Location:  [facility] OR;  Service: Plastics;  Laterality: Right;    INCISION AND DRAINAGE OF WOUND Right 7/8/2021    Procedure: INCISION AND DRAINAGE WOUND RIGHT HIP;  Surgeon: James Huntley MD;  Location:  [facility] OR;  Service: Orthopedics;  Laterality: Right;    JOINT REPLACEMENT      KYPHOPLASTY WITH BIOPSY Bilateral 10/26/2021    Procedure: THOARCIC 12 KYPHOPLASTY WITH BIOPSY;  Surgeon: Bandar Shea MD;  Location:  [facility] OR;  Service: Neurosurgery;  Laterality: Bilateral;    LEG DEBRIDEMENT Right 9/14/2021    Procedure: DEBRIDEMENT OF RIGHT HIP WOUND, RIGHT GLUTEAL FASCIOCUTANEOUS ADVANCEMENT FLAP AND RIGHT TENSOR FASCIAL JESSICA FLAP;  Surgeon: Amadeo Turner MD;  Location:  [facility] OR;  Service: Plastics;  Laterality: Right;    LUMBAR DISCECTOMY Right 3/23/2021    Procedure: LUMBAR DISCECTOMY MICRO, Lumbar 1/2 right;  Surgeon: Bandar Shea MD;  Location:  [facility] OR;  Service: Neurosurgery;   "Laterality: Right;    LUMBAR FUSION N/A 1/19/2018    Procedure: L3-4,L4-5 DECOMPRESSION, POSTERIOR SPINAL FUSION WITH INSTRUMENTATION;  Surgeon: Fortino Oropeza MD;  Location:  [facility] OR;  Service:     LUMBAR LAMINECTOMY WITH FUSION Left 1/17/2018    Procedure: LEFT L3-4 L4-5 LATERAL LUMBAR INTERBODY FUSION;  Surgeon: Fortino Oropeza MD;  Location:  [facility];  Service:     MASS EXCISION Right 4/23/2024    Procedure: RIGHT BUTTOCK MASS EXCISION;  Surgeon: Moises Keyes MD;  Location:  [facility] OR;  Service: General;  Laterality: Right;    MYRINGOTOMY W/ TUBES  09/04/2014    TUBES NO LONGER IN PLACE    OTHER SURGICAL HISTORY      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 so hardware was removed and spacers were placed    REPLACEMENT TOTAL KNEE Right        Social History     Socioeconomic History    Marital status:    Tobacco Use    Smoking status: Never     Passive exposure: Never    Smokeless tobacco: Never   Vaping Use    Vaping status: Never Used   Substance and Sexual Activity    Alcohol use: No    Drug use: Never    Sexual activity: Defer     Birth control/protection: Abstinence       Family History   Problem Relation Age of Onset    Cancer Mother         Lung cancer    Heart disease Father         Doied of heart. Attack       /53 (BP Location: Right arm, Patient Position: Lying)   Pulse 73   Temp 98 °F (36.7 °C) (Oral)   Resp 16   Ht 167.6 cm (66\")   Wt 94.8 kg (209 lb)   LMP  (LMP Unknown)   SpO2 94%   BMI 33.73 kg/m²     Physical Exam  Constitutional:   Temp:  [97.4 °F (36.3 °C)-98 °F (36.7 °C)] 98 °F (36.7 °C)  Heart Rate:  [72-83] 73  Resp:  [16] 16  BP: (114-121)/(40-58) 120/53  ] Well developed, well nourished, no distress  Respiratory:   Effort unlabored; Movements symmetric  GI:   No mass or hernia noted, not distended or tender   No enlargement of spleen or liver noted  Skin:   No pallor or cyanosis; No obvious rash  Psych:   Alert, Oriented x 3           Lab Results   Component Value Date " "   GLUCOSE 97 06/20/2024    BUN 22 06/20/2024    CREATININE 1.07 (H) 06/20/2024    EGFRIFNONA 99 11/11/2021    EGFRIFAFRI 110 10/12/2021    BCR 20.6 06/20/2024    CO2 28.0 06/20/2024    CALCIUM 8.6 06/20/2024    PROTENTOTREF 6.0 05/16/2024    ALBUMIN 3.3 (L) 06/12/2024    LABIL2 1.1 05/16/2024    AST 27 06/12/2024    ALT 23 06/12/2024     Lab Results   Component Value Date    GLUCOSE 97 06/20/2024    CALCIUM 8.6 06/20/2024     (L) 06/20/2024    K 3.4 (L) 06/20/2024    CO2 28.0 06/20/2024    CL 97 (L) 06/20/2024    BUN 22 06/20/2024    CREATININE 1.07 (H) 06/20/2024    EGFRIFAFRI 110 10/12/2021    EGFRIFNONA 99 11/11/2021    BCR 20.6 06/20/2024    ANIONGAP 8.0 06/20/2024     Lab Results   Component Value Date    WBC 6.32 06/20/2024    HGB 7.5 (L) 06/20/2024    HCT 25.2 (L) 06/20/2024    MCV 94.0 06/20/2024     06/20/2024     No results found for: \"PSA\"  No results found for: \"URINECX\"  Brief Urine Lab Results  (Last result in the past 365 days)        Color   Clarity   Blood   Leuk Est   Nitrite   Protein   CREAT   Urine HCG        06/16/24 1219 Yellow   Clear   Negative   Large (3+)   Negative   Negative                   Imaging Results (Last 7 Days)       Procedure Component Value Units Date/Time    CT Abdomen Pelvis Without Contrast [[id number]] Collected: 06/20/24 1333     Updated: 06/20/24 1350    Narrative:      CT ABDOMEN PELVIS WO CONTRAST- 6/20/2024 11:43 AM     HISTORY: Rule out kidney stone.; N39.0-Urinary tract infection, site not  specified; R41.82-Altered mental status, unspecified;  S31.819S-Unspecified open wound of right buttock, sequela;  L72.9-Follicular cyst of the skin and subcutaneous tissue, unspecified;  R13.10-Dysphagia, unspecified; Z74.09-Other reduced mobility      COMPARISON: Previous CT pelvis with contrast dated 4/21/2024     DLP: 633.64 mGy.cm . All CT scans are performed using dose optimization  techniques as appropriate to the performed exam and including at least  one " of the following: Automated exposure control, adjustment of the mA  and/or kV according to size, and the use of the iterative reconstruction  technique.     TECHNIQUE: Noncontrast enhanced images of the abdomen and pelvis  obtained without oral contrast.     FINDINGS:  Left lingular and left lower lobe subsegmental atelectasis is present.  The heart is mildly enlarged. Extensive coronary artery calcifications  are present. A transvenous pacer is in place..     LIVER: No focal liver lesion.     BILIARY SYSTEM: Gallstones are demonstrated within the gallbladder neck.  Gallbladder is mildly distended. There is no biliary dilatation  present..     PANCREAS: No focal pancreatic lesion.     SPLEEN: Splenic granulomas are present. No evidence of splenomegaly..     KIDNEYS AND ADRENALS: The adrenals are unremarkable. Calcifications  within the renal mahesh bilaterally are felt to be vascular in nature. No  perinephric fluid collections are present. No discrete renal mass. There  is asymmetric dilatation of the upper tracts of the left kidney. The  left ureter is asymmetrically distended to the level of the true pelvis.  There is a distal 6 x 5 mm intraureteral stone on image 117 of series 2  within the ureter approximately 4 cm above the left UVJ. The left ureter  below the level of the stone is decompressed.. The right ureter is  decompressed and normal in appearance..     RETROPERITONEUM: No mass, lymphadenopathy or hemorrhage.     GI TRACT: Constipation is present with a large volume of stool  throughout the colon. The small bowel is normal in distribution and  appearance. There is no gastric wall thickening or gastric outlet  obstruction.. The appendix is visualized and unremarkable.     OTHER: There is no mesenteric mass, lymphadenopathy or fluid collection.  The osseous structures and soft tissues demonstrate no worrisome  lesions. There is previous compression deformity at T12 with previous  kyphoplasty. The patient  has undergone fusion at the L3-L4 and L4-L5  level with pedicle screws and interbody grafts. There is osteopenia. A  small fat-containing periumbilical hernia is present.     PELVIS: There is a calcified fibroid within the lower uterine segment.  No adnexal mass or free fluid.. The urinary bladder is normal in  appearance.       Impression:      1. Obstructive uropathy on the left with mild to moderate dilatation of  the upper tracts of the left kidney and left ureter into the true pelvis  where there is a 5 x 6 mm calculus within the left ureter approximately  4 to 5 cm above the UVJ. The right ureter is decompressed and normal in  appearance. No evidence of renal mass. No perinephric fluid collection  present.  2. The gallbladder is mildly distended. There are gallstones within the  gallbladder neck. No pericholecystic inflammatory changes or biliary  dilatation.  3. Constipation. No obstruction or free air. Normal appendix.  3. Calcified fibroid within the lower uterine segment.  4. Mild constipation. No obstruction or free air. Normal appendix.  5. Small fat-containing periumbilical hernia.  6. Previous kyphoplasty at T12. Previous fusion at L3-L4 and L4-L5.  7. Left basilar atelectasis..           This report was signed and finalized on 6/20/2024 1:46 PM by Dr. Neymar Calderon MD.             Reviewed report and the images.  She has a left mid ureteral calculus that suggest below the level of the iliac vessels.  This is associated with moderate hydronephrosis.  There is existing uroepithelial thickening to suggest this has been going on a while.  The right side is free of stone other than possibly some small punctate calcifications in the kidney although I cannot rule these out as being vascular in origin./DLS      Assessment and Plan  Recurrent urinary tract infection  Neurogenic bladder  Left mid ureteral calculus with obstruction    Patient has a significant obstruction on the left side associated urinary  tract infection.  If untreated this will certainly lead to renal damage and predispose her to severe sepsis.  She will need cystoscopy and left stent placed.  Consideration for laser lithotripsy will be based on infection.  However she has been on cefepime now for 3 days which may sterilize urine enough that we could proceed with ureteroscopic laser lithotripsy.  This is a high risk procedure given her recurring infections and chronic colonization of the urinary tract.  I will obtain some urine from about the kidney to see what that looks like.    (Please note that portions of this note were completed with a voice recognition program.)  Ky Plascencia MD  06/20/24  14:20 CDT

## 2024-06-20 NOTE — PLAN OF CARE
Goal Outcome Evaluation:  Plan of Care Reviewed With: patient, caregiver        Progress: improving  Outcome Evaluation: Pt A&OX4. VSS on RA. PPP. C/O pain managed W/ PRN meds. IV to R FA IID.  IV ABX administered as ordered. Dressing to coccyx changed. Family @ bedside. Call light within reach, safety maintained.

## 2024-06-20 NOTE — CASE MANAGEMENT/SOCIAL WORK
Continued Stay Note  [facility]     Patient Name: Tawny Shin  MRN: [id number]  Today's Date: 6/20/2024    Admit Date: 6/12/2024    Plan: [facility]   Discharge Plan       Row Name 06/20/24 7865       Plan    Plan Comments Plan is still for dc to home when pt is medically stable. Pt plans to continue services from [facility]. Will need updated orders at dc.                   Discharge Codes    No documentation.                 Expected Discharge Date and Time       Expected Discharge Date Expected Discharge Time    Jun 20, 2024               OSIEL Pizano

## 2024-06-20 NOTE — PROGRESS NOTES
"INFECTIOUS DISEASES PROGRESS NOTE    Patient:  Tawny Shin  YOB: 1955  MRN: [id number]   Admit date: 6/12/2024   Admitting Physician: Saulo Ambriz MD  Primary Care Physician: Moises Oliveira MD    Chief Complaint: None offered      Interval History: Patient's caregiver noted that she is working with therapy and doing better.  Again, I tried to reviewed patient's complaints about not being able to urinate.  The patient's caregiver was very helpful stated that the patient often will be incontinent or will urinate in her brief when she is reclined and relaxed.    She often will try to sit on the toilet at home and states she \"cannot pee\"    She did have in and out catheterization yesterday with 80 mL of urine.  Her caregiver said that was after she had gone in her brief      Allergies:   Allergies   Allergen Reactions    Atorvastatin Other (See Comments)     LEG CRAMPS      Amoxicillin Rash    Escitalopram Rash    Nabumetone Rash    Niacin Er Rash    Penicillin G Rash    Penicillins Rash    Simvastatin Rash       Current Scheduled Medications:   apixaban, 5 mg, Oral, BID  ascorbic acid, 500 mg, Oral, Daily  aspirin, 81 mg, Oral, Daily  carvedilol, 25 mg, Oral, BID With Meals  cefepime, 2,000 mg, Intravenous, Q12H  DULoxetine, 60 mg, Oral, Daily  estradiol, 2 g, Vaginal, Daily  ferrous sulfate, 325 mg, Oral, Daily With Breakfast  folic acid, 1,000 mcg, Oral, Daily  furosemide, 40 mg, Oral, Daily  isosorbide mononitrate, 60 mg, Oral, Daily  leflunomide, 20 mg, Oral, Daily  levothyroxine, 75 mcg, Oral, Q AM  losartan, 50 mg, Oral, Daily  methenamine, 1 g, Oral, BID  multivitamin with minerals, 1 tablet, Oral, Daily  pantoprazole, 40 mg, Oral, Daily  potassium chloride, 40 mEq, Oral, Once  sodium chloride, 10 mL, Intravenous, Q12H  spironolactone, 25 mg, Oral, Daily      Current PRN Medications:    acetaminophen    senna-docusate sodium **AND** polyethylene glycol **AND** bisacodyl **AND** " "bisacodyl    Calcium Replacement - Follow Nurse / BPA Driven Protocol    Diclofenac Sodium    Lidocaine    Magnesium Low Dose Replacement - Follow Nurse / BPA Driven Protocol    [DISCONTINUED] Morphine **AND** naloxone    nitroglycerin    ondansetron    Phosphorus Replacement - Follow Nurse / BPA Driven Protocol    Potassium Replacement - Follow Nurse / BPA Driven Protocol    sodium chloride    sodium chloride    sodium chloride    traMADol            Objective     Vital Signs:  Temp (24hrs), Av.9 °F (36.6 °C), Min:97.4 °F (36.3 °C), Max:98.2 °F (36.8 °C)      /46 (BP Location: Right arm, Patient Position: Lying)   Pulse 77   Temp 97.4 °F (36.3 °C) (Oral)   Resp 16   Ht 167.6 cm (66\")   Wt 94.8 kg (209 lb)   LMP  (LMP Unknown)   SpO2 95%   BMI 33.73 kg/m²         Physical Exam:    General: The patient is nontoxic-appearing lying in bed in no acute distress.  Her caregiver at bedside  Respiratory: Effort even and unlabored, she was not conversationally dyspneic  Neuro: Alert, speech clear  Psych: Pleasant cooperative      Right gluteal wound      Results Review:    I reviewed the patient's new clinical results.    Lab Results:    CBC:   Lab Results   Lab 24  0418 24  0122 24  0354   WBC 6.75 6.69 6.32   HEMOGLOBIN 9.7* 7.4* 7.5*   HEMATOCRIT 32.4* 25.3* 25.2*   PLATELETS 174 168 192        AutoDiff:            Manual Diff:          Invalid input(s): \"MONABS\"          CMP:   Lab Results   Lab 24  0418 24  0018 24  0354   SODIUM 136 135* 133*   POTASSIUM 4.1 3.7 3.4*   CHLORIDE 100 101 97*   CO2 27.0 26.0 28.0   BUN 23 23 22   CREATININE 1.08* 1.16* 1.07*   CALCIUM 8.4* 8.3* 8.6   GLUCOSE 122* 120* 97       Estimated Creatinine Clearance: 56.8 mL/min (A) (by C-G formula based on SCr of 1.07 mg/dL (H)).    Culture Results:    Microbiology Results (last 10 days)       Procedure Component Value - Date/Time    Urine Culture - Urine, Urine, Clean Catch [[id number]]  " (Abnormal)  (Susceptibility) Collected: 06/16/24 1219    Lab Status: Final result Specimen: Urine, Clean Catch Updated: 06/19/24 0936     Urine Culture 25,000 CFU/mL Enterobacter cloacae complex CRE     Comment:   Consider infectious disease consult.  Susceptibility results may not correlate to clinical outcomes.  Carbapenem resistant Enterobacteriaceae, patient may be an isolation risk.  No carbapenemase detected.       Narrative:      Colonization of the urinary tract without infection is common. Treatment is discouraged unless the patient is symptomatic, pregnant, or undergoing an invasive urologic procedure.    Susceptibility        Enterobacter cloacae complex CRE      LICHA      Cefepime Susceptible      Ceftazidime Resistant      Ceftriaxone Resistant      Gentamicin Susceptible      Imipenem Intermediate      Levofloxacin Resistant      Meropenem Resistant      Nitrofurantoin Resistant      Piperacillin + Tazobactam Resistant      Trimethoprim + Sulfamethoxazole Resistant                           COVID PRE-OP / PRE-PROCEDURE SCREENING ORDER (NO ISOLATION) - Swab, Nasopharynx [[id number]]  (Normal) Collected: 06/12/24 2125    Lab Status: Final result Specimen: Swab from Nasopharynx Updated: 06/12/24 2156    Narrative:      The following orders were created for panel order COVID PRE-OP / PRE-PROCEDURE SCREENING ORDER (NO ISOLATION) - Swab, Nasopharynx.  Procedure                               Abnormality         Status                     ---------                               -----------         ------                     COVID-19 and FLU A/B PCR...[[id number]]  Normal              Final result                 Please view results for these tests on the individual orders.    COVID-19 and FLU A/B PCR, 1 HR TAT - Swab, Nasopharynx [[id number]]  (Normal) Collected: 06/12/24 2125    Lab Status: Final result Specimen: Swab from Nasopharynx Updated: 06/12/24 2156     COVID19 Not Detected     Influenza A PCR Not  Detected     Influenza B PCR Not Detected    Narrative:      Fact sheet for providers: https://www.fda.gov/media/366580/download    Fact sheet for patients: https://www.fda.gov/media/300473/download    Test performed by PCR.    Blood Culture - Blood, Arm, Left [[id number]]  (Normal) Collected: 06/12/24 2120    Lab Status: Final result Specimen: Blood from Arm, Left Updated: 06/17/24 2145     Blood Culture No growth at 5 days    Urine Culture - Urine, Straight Cath [[id number]]  (Abnormal)  (Susceptibility) Collected: 06/12/24 2117    Lab Status: Final result Specimen: Urine from Straight Cath Updated: 06/17/24 1203     Urine Culture >100,000 CFU/mL Enterobacter cloacae complex     Comment:   This organism may develop resistance during prolonged therapy with 3rd generation cephalosporins (e.g. ceftriaxone) as a result of de-repression of AmpC B-lactamase.  Ceftriaxone may be a reasonable treatment option for uncomplicated cystitis or other lower severity infections when susceptibility is demonstrated.         50,000 CFU/mL Mixed Selina Isolated    Narrative:      Specimen contains mixed organisms of questionable pathogenicity suggestive of contamination. If symptoms persist, suggest recollection.  Colonization of the urinary tract without infection is common. Treatment is discouraged unless the patient is symptomatic, pregnant, or undergoing an invasive urologic procedure.    Susceptibility        Enterobacter cloacae complex      LICHA      Cefepime Susceptible      Ceftazidime Resistant      Ceftriaxone Resistant      Gentamicin Susceptible      Imipenem Intermediate      Levofloxacin Resistant      Nitrofurantoin Resistant      Piperacillin + Tazobactam Resistant      Trimethoprim + Sulfamethoxazole Resistant                           Blood Culture - Blood, Arm, Right [[id number]]  (Normal) Collected: 06/12/24 2112    Lab Status: Final result Specimen: Blood from Arm, Right Updated: 06/17/24 2145     Blood Culture No  growth at 5 days                 Radiology:   Imaging Results (Last 72 Hours)       ** No results found for the last 72 hours. 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  Possible urinary tract infection  Recurrent very frequent admissions with probable urinary tract infections, some associated with bacteremia and different organisms.  No intra-abdominal process has been identified as a source for the different bacterial infections.  Patient with issues regarding incontinence, inability to void at times sitting on toilet.  Right gluteal wound      RECOMMENDATION:   Continue cefepime  I feel like we need to get urology assistance for any recommendations.  Will place consult  Continue local wound care to right gluteal wound continue      Maggie Bang MD  06/20/24  08:02 CDT

## 2024-06-20 NOTE — THERAPY TREATMENT NOTE
Acute Care - Occupational Therapy Treatment Note  [facility]     Patient Name: Tawny Shin  : 1955  MRN: [id number]  Today's Date: 2024     Date of Referral to OT: 24       Admit Date: 2024       ICD-10-CM ICD-9-CM   1. Acute UTI (urinary tract infection)  N39.0 599.0   2. Altered mental status, unspecified altered mental status type  R41.82 780.97   3. Open wound of right buttock, sequela  S31.819S 906.0   4. Cyst of buttocks  L72.9 706.2   5. Dysphagia, unspecified type  R13.10 787.20   6. Impaired mobility [Z74.09]  Z74.09 799.89   7. Hydronephrosis with urinary obstruction due to ureteral calculus  N13.2 592.1     591     Patient Active Problem List   Diagnosis    T12 compression fracture, initial encounter    Chronic embolism and thrombosis of unspecified deep veins of left lower extremity    Chronic anticoagulation    Iron deficiency anemia    Osteoporosis    E coli bacteremia    Epidural hematoma    Pleural effusion, left    Functional neurological symptom disorder with weakness or paralysis    Class 1 obesity due to excess calories with serious comorbidity and body mass index (BMI) of 33.0 to 33.9 in adult    Rheumatoid arthritis involving multiple sites    Chronic heart failure with preserved ejection fraction    Venous insufficiency    Coronary artery disease involving native coronary artery of native heart without angina pectoris    Sick sinus syndrome    Essential hypertension    Pressure injury of skin of heel    Chronic pain    Anemia of chronic disease    Cholelithiasis    Pressure injury of skin of buttock    Near functional paraplegia    Skin cancer    Squamous cell carcinoma of back    Hematoma    Right-sided chest wall pain    Hyperlipidemia LDL goal <70    Malodorous urine    Stage 3b chronic kidney disease    Cyst of buttocks    Sepsis due to Escherichia coli without acute organ dysfunction    Generalized weakness    Paralysis    History of urinary retention     "Bacteremia due to Enterobacter species    Bacteremia    Hypothyroidism    Abnormal CT of the abdomen    Presence of cardiac pacemaker    Altered mental status    UTI (urinary tract infection)    Esophageal dysphagia    Hydronephrosis with urinary obstruction due to ureteral calculus     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    Elevated cholesterol     Eustachian tube dysfunction     Heart disease     Herpes simplex     History of transfusion     Hyperlipidemia     Hypertension     Hypothyroidism 12/23/2020    Intrinsic asthma 12/23/2020    Knee dislocation     Labral tear of right hip joint     Laryngitis sicca     Laryngitis, chronic     Left carotid bruit 03/09/2016    MI (myocardial infarction)     Myalgia due to statin 06/25/2019    Open wound of right hip 09/14/2021    Osteomyelitis of right femur 07/06/2021    Otorrhea     Pacemaker 11/17/2016    Primary osteoarthritis of left knee 12/23/2020    Psoriasis vulgaris 12/23/2020    S/P coronary artery stent placement 03/09/2016    Sensorineural hearing loss     Seropositive rheumatoid arthritis of multiple sites 12/27/2019    Overview:  -myochrysine '93-'96 -methotrexate '96--->11/98;r/s  restarted 2/99--> 8/14 (anemia) -sulfasalazine- not effective -penicillamine 6/98-->10/98; no effect -leflunomide 11/98--> - Humira '13-->didn't take - Enbrel 12/14-->3/15- no effect!   Last Assessment & Plan:  - \"aching all over\" because she had to be off " her anti-rheumatic drugs for 2 weeks in preparation for her R knee surgery - he    Sick sinus syndrome 12/27/2019    Sjogren's disease     Spondylolisthesis of lumbar region 01/17/2018    Syncope, recurrent 02/08/2021    Urinary tract infection     UTI (urinary tract infection) 04/19/2024     Past Surgical History:   Procedure Laterality Date    A-V CARDIAC PACEMAKER INSERTION  2016    ATRIAL CARDIAC PACEMAKER INSERTION      CARDIAC CATHETERIZATION      CATARACT EXTRACTION      CERVICAL CORPECTOMY N/A 3/3/2021    Procedure: CERVICAL 6 CORPECTOMY WITH TITANIUM CAGE WITH NEURO MONITORING;  Surgeon: Bandar Shea MD;  Location:  [facility] OR;  Service: Neurosurgery;  Laterality: N/A;    COLONOSCOPY  11/08/2011    One fold in the ascending colon which showed ulcer otherwise normal exam    COLONOSCOPY  11/12/2004    Normal exam repeat in five years    CORONARY ANGIOPLASTY WITH STENT PLACEMENT      X 2; 2013 & 2014    ENDOSCOPY  07/10/2014    Normal exam    ENDOSCOPY N/A 5/30/2024    Procedure: ESOPHAGOGASTRODUODENOSCOPY WITH ANESTHESIA;  Surgeon: Taiwo Sanchez MD;  Location: [facility] ENDOSCOPY;  Service: Gastroenterology;  Laterality: N/A;  preop; dysphagia  postop: balloon dilation  PCP Jesus Manuel jeff    FLAP LEG Right 9/14/2021    Procedure: RIGHT GLUTEAL FASCIOCUTANEOUS ADVANCEMENT FLAP AND RIGHT TENSOR FASCIAL JESSICA FLAP;  Surgeon: Amadeo Turner MD;  Location:  [facility] OR;  Service: Plastics;  Laterality: Right;    HIP ABDUCTION TENOTOMY BILATERAL Right 1/14/2021    Procedure: RIGHT HIP GLUTEUS MEDLUS / MINIMUS REPAIR, POSSIBLE ACHILLES ALLOGRAFT;  Surgeon: Nino Carlson MD;  Location:  [facility];  Service: Orthopedics;  Laterality: Right;    INCISION AND DRAINAGE ABSCESS Right 6/4/2022    Procedure: INCISION AND DRAINAGE ABSCESS right hip;  Surgeon: Magda Salcido MD;  Location:  [facility];  Service: General;  Laterality: Right;    INCISION AND DRAINAGE ABSCESS Right 6/10/2022    Procedure: RIGHT HIP INCISION  AND DRAINAGE. MD NEEDS 3L VANC IRRIGATION, CURRETTES, DAICANS, KERLEX ROLLS;  Surgeon: Amadeo Turner MD;  Location:  [facility];  Service: Plastics;  Laterality: Right;    INCISION AND DRAINAGE HIP Right 2/9/2021    Procedure: HIP INCISION AND DRAINAGE;  Surgeon: Nino Carlson MD;  Location:  [facility];  Service: Orthopedics;  Laterality: Right;    INCISION AND DRAINAGE LEG Right 10/24/2021    Procedure: INCISION AND DRAINAGE LOWER EXTREMITY;  Surgeon: Amadeo Turner MD;  Location:  [facility];  Service: Plastics;  Laterality: Right;    INCISION AND DRAINAGE OF WOUND Right 7/8/2021    Procedure: INCISION AND DRAINAGE WOUND RIGHT HIP;  Surgeon: James Huntley MD;  Location:  [facility];  Service: Orthopedics;  Laterality: Right;    JOINT REPLACEMENT      KYPHOPLASTY WITH BIOPSY Bilateral 10/26/2021    Procedure: THOARCIC 12 KYPHOPLASTY WITH BIOPSY;  Surgeon: Bandar Shea MD;  Location:  [facility];  Service: Neurosurgery;  Laterality: Bilateral;    LEG DEBRIDEMENT Right 9/14/2021    Procedure: DEBRIDEMENT OF RIGHT HIP WOUND, RIGHT GLUTEAL FASCIOCUTANEOUS ADVANCEMENT FLAP AND RIGHT TENSOR FASCIAL JESSICA FLAP;  Surgeon: Amadeo Turner MD;  Location:  [facility];  Service: Plastics;  Laterality: Right;    LUMBAR DISCECTOMY Right 3/23/2021    Procedure: LUMBAR DISCECTOMY MICRO, Lumbar 1/2 right;  Surgeon: Bandar Shea MD;  Location:  [facility];  Service: Neurosurgery;  Laterality: Right;    LUMBAR FUSION N/A 1/19/2018    Procedure: L3-4,L4-5 DECOMPRESSION, POSTERIOR SPINAL FUSION WITH INSTRUMENTATION;  Surgeon: Fortino Oropeza MD;  Location:  [facility];  Service:     LUMBAR LAMINECTOMY WITH FUSION Left 1/17/2018    Procedure: LEFT L3-4 L4-5 LATERAL LUMBAR INTERBODY FUSION;  Surgeon: Fortino Oropeza MD;  Location:  [facility];  Service:     MASS EXCISION Right 4/23/2024    Procedure: RIGHT BUTTOCK MASS EXCISION;  Surgeon: Moises Keyes MD;  Location:  [facility];  Service: General;   Laterality: Right;    MYRINGOTOMY W/ TUBES  09/04/2014    TUBES NO LONGER IN PLACE    OTHER SURGICAL HISTORY      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 so hardware was removed and spacers were placed    REPLACEMENT TOTAL KNEE Right          OT ASSESSMENT FLOWSHEET (Last 12 Hours)       OT Evaluation and Treatment       Row Name 06/20/24 1510 06/20/24 1357                OT Time and Intention    Subjective Information complains of;weakness  -EC --       Document Type therapy note (daily note)  -EC --       Mode of Treatment occupational therapy  -EC --       Session Not Performed -- patient/family declined treatment  -EC       Comment, Session Not Performed -- eating lunch & visiting with friends, request check back  -EC          General Information    Patient Profile Reviewed yes  -EC --       Existing Precautions/Restrictions fall  -EC --          Pain Assessment    Pain Intervention(s) Repositioned  -EC --       Additional Documentation Pain Scale: FACES Pre/Post-Treatment (Group)  -EC --          Pain Scale: FACES Pre/Post-Treatment    Pain: FACES Scale, Pretreatment 4-->hurts little more  -EC --       Posttreatment Pain Rating 4-->hurts little more  -EC --       Pain Location - Side/Orientation Right  -EC --       Pain Location - knee  -EC --          Activities of Daily Living    BADL Assessment/Intervention grooming  -EC --          Grooming Assessment/Training    [place] Level (Grooming) oral care regimen;set up  -EC --       Oral Care teeth brushed - regular toothbrush  -EC --       Position (Grooming) edge of bed sitting  -EC --          Bed Mobility    Bed Mobility supine-sit;sit-supine;scooting/bridging;rolling left  -EC --       Rolling Left [place] (Bed Mobility) minimum assist (75% patient effort)  -EC --       Scooting/Bridging [place] (Bed Mobility) maximum assist (25% patient effort);2 person assist  -EC --       Supine-Sit [place] (Bed Mobility) minimum assist (75% patient effort)  -EC  --       Sit-Supine [place] (Bed Mobility) moderate assist (50% patient effort);2 person assist  -EC --       Bed Mobility, Safety Issues decreased use of legs for bridging/pushing  -EC --          Motor Skills    Therapeutic Exercise elbow/forearm  B bicep curls 2 x 10 reps 2 lb DBs  -EC --          Wound 04/23/24 0936 Right gluteal Incision    Wound - Properties Group Placement Date: 04/23/24  -EP Placement Time: 0936  -EP Present on Original Admission: N  -EP Side: Right  -EP Location: gluteal  -EP Primary Wound Type: Incision  -EP    Retired Wound - Properties Group Placement Date: 04/23/24  -EP Placement Time: 0936  -EP Present on Original Admission: N  -EP Side: Right  -EP Location: gluteal  -EP Primary Wound Type: Incision  -EP    Retired Wound - Properties Group Date first assessed: 04/23/24  -EP Time first assessed: 0936  -EP Present on Original Admission: N  -EP Side: Right  -EP Location: gluteal  -EP Primary Wound Type: Incision  -EP       Plan of Care Review    Plan of Care Reviewed With patient;caregiver  -EC --       Progress no change  -EC --       Outcome Evaluation OT tx completed. Pt in fowlers & wanting to brush her teeth sitting EOB. Pt performs bed mob with min-modA. She maintained sitting balance with CGA while performing oral hygiene with setup. Pt began to feel dizzy & requested to lay back down. BP taken in supine & was 112/54. Once in fowlers, she performed 2 x 10 reps B bicep curls w 2lb DB to increase her strength for ADLs & fxn transfers. Cont OT tx  -EC --          Vital Signs    Post Systolic BP Rehab 112  -EC --       Post Treatment Diastolic BP 56  -EC --          Positioning and Restraints    Pre-Treatment Position in bed  -EC --       Post Treatment Position bed  -EC --       In Bed notified nsg;fowlers;call light within reach;encouraged to call for assist;with family/caregiver  -EC --                 User Key  (r) = Recorded By, (t) = Taken By, (c) = Cosigned By      Initials  Name Effective Dates    EP Sonny Rivero RN 01/22/24 -     EC Ashley Martino OTR/L 10/13/23 -                      Occupational Therapy Education       Title: PT OT SLP Therapies (Done)       Topic: Occupational Therapy (Done)       Point: ADL training (Done)       Description:   Instruct learner(s) on proper safety adaptation and remediation techniques during self care or transfers.   Instruct in proper use of assistive devices.                  Learning Progress Summary             Patient Acceptance, E, VU by EC at 6/20/2024 1558    Acceptance, E, VU by EC at 6/17/2024 1525   Caregiver Acceptance, E, VU by EC at 6/20/2024 1558                         Point: Home exercise program (Done)       Description:   Instruct learner(s) on appropriate technique for monitoring, assisting and/or progressing therapeutic exercises/activities.                  Learning Progress Summary             Patient Acceptance, E, VU by EC at 6/20/2024 1558   Caregiver Acceptance, E, VU by EC at 6/20/2024 1558                         Point: Precautions (Done)       Description:   Instruct learner(s) on prescribed precautions during self-care and functional transfers.                  Learning Progress Summary             Patient Acceptance, E, VU,NR by  at 6/19/2024 1136    Acceptance, E, VU by EC at 6/17/2024 1525                         Point: Body mechanics (Done)       Description:   Instruct learner(s) on proper positioning and spine alignment during self-care, functional mobility activities and/or exercises.                  Learning Progress Summary             Patient Acceptance, E, VU by EC at 6/20/2024 1558    Acceptance, E, VU,NR by  at 6/19/2024 1136    Acceptance, E, VU by EC at 6/17/2024 1525   Caregiver Acceptance, E, VU by EC at 6/20/2024 1558                                         User Key       Initials Effective Dates Name Provider Type Discipline     06/20/22 -  Bianca Mcgarry, OTR/L Occupational Therapist OT     EC 10/13/23 -  Ashley Martino, OTR/L Occupational Therapist OT                      OT Recommendation and Plan  Planned Therapy Interventions (OT): activity tolerance training, adaptive equipment training, BADL retraining, functional balance retraining, occupation/activity based interventions, patient/caregiver education/training, strengthening exercise, transfer/mobility retraining  Therapy Frequency (OT): 5 times/wk  Plan of Care Review  Plan of Care Reviewed With: patient, caregiver  Progress: no change  Outcome Evaluation: OT tx completed. Pt in fowlers & wanting to brush her teeth sitting EOB. Pt performs bed mob with min-modA. She maintained sitting balance with CGA while performing oral hygiene with setup. Pt began to feel dizzy & requested to lay back down. BP taken in supine & was 112/54. Once in fowlers, she performed 2 x 10 reps B bicep curls w 2lb DB to increase her strength for ADLs & fxn transfers. Cont OT tx  Plan of Care Reviewed With: patient, caregiver  Outcome Evaluation: OT tx completed. Pt in fowlers & wanting to brush her teeth sitting EOB. Pt performs bed mob with min-modA. She maintained sitting balance with CGA while performing oral hygiene with setup. Pt began to feel dizzy & requested to lay back down. BP taken in supine & was 112/54. Once in fowlers, she performed 2 x 10 reps B bicep curls w 2lb DB to increase her strength for ADLs & fxn transfers. Cont OT tx     Outcome Measures       Row Name 06/20/24 1500 06/20/24 0938 06/19/24 1300       How much help from another person do you currently need...    Turning from your back to your side while in flat bed without using bedrails? -- 3  -MF 3  -AH    Moving from lying on back to sitting on the side of a flat bed without bedrails? -- 3  -MF 3  -AH    Moving to and from a bed to a chair (including a wheelchair)? -- 2  -MF 2  -AH    Standing up from a chair using your arms (e.g., wheelchair, bedside chair)? -- 2  -MF 2  -AH    Climbing 3-5  steps with a railing? -- 1  - 1  -    To walk in hospital room? -- 1  - 1  -    AM-PAC 6 Clicks Score (PT) -- 12  - 12  -    Highest Level of Mobility Goal -- 4 --> Transfer to chair/commode  - 4 --> Transfer to chair/commode  -       How much help from another is currently needed...    Putting on and taking off regular lower body clothing? 1  -EC -- --    Bathing (including washing, rinsing, and drying) 2  -EC -- --    Toileting (which includes using toilet bed pan or urinal) 2  -EC -- --    Putting on and taking off regular upper body clothing 3  -EC -- --    Taking care of personal grooming (such as brushing teeth) 3  -EC -- --    Eating meals 4  -EC -- --    AM-PAC 6 Clicks Score (OT) 15  -EC -- --       Functional Assessment    Outcome Measure [facility] AM-PAC 6 Clicks Daily Activity (OT)  - AM-PAC 6 Clicks Basic Mobility (PT)  - AM-PAC 6 Clicks Basic Mobility (PT)  -      Row Name 06/18/24 1400             How much help from another is currently needed...    Putting on and taking off regular lower body clothing? 1  -TS      Bathing (including washing, rinsing, and drying) 2  -TS      Toileting (which includes using toilet bed pan or urinal) 2  -TS      Putting on and taking off regular upper body clothing 3  -TS      Taking care of personal grooming (such as brushing teeth) 3  -TS      Eating meals 4  -TS      AM-PAC 6 Clicks Score (OT) 15  -TS                User Key  (r) = Recorded By, (t) = Taken By, (c) = Cosigned By      Initials Name Provider Type    Olga Rao, SERENA Physical Therapist Assistant    TS Carolee Giordano COTA Occupational Therapist Assistant    Cristina Shine, PTA Physical Therapist Assistant    Ashley Nash, OTR/L Occupational Therapist                    Time Calculation:    Time Calculation- OT       Row Name 06/20/24 1536             Time Calculation- OT    OT Start Time 1510  -EC      OT Stop Time 1536  -EC      OT Time Calculation (min) 26  min  -EC      Total Timed Code Minutes- OT 26 minute(s)  -EC      OT Received On 06/20/24  -EC         Timed Charges    36915 - OT Therapeutic Exercise Minutes 11  -EC      33315 - OT Self Care/Mgmt Minutes 15  -EC         Total Minutes    Timed Charges Total Minutes 26  -EC       Total Minutes 26  -EC                User Key  (r) = Recorded By, (t) = Taken By, (c) = Cosigned By      Initials Name Provider Type    EC Ashley Martino, OTR/L Occupational Therapist                  Therapy Charges for Today       Code Description Service Date Service Provider Modifiers Qty    54087010487 HC OT THER PROC EA 15 MIN 6/20/2024 Ashley Martino OTR/L GO 1    65047353155 HC OT SELF CARE/MGMT/TRAIN EA 15 MIN 6/20/2024 Ashley Martino OTR/L GO 1                 Ashley Martino OTR/L  6/20/2024

## 2024-06-20 NOTE — THERAPY TREATMENT NOTE
Acute Care - Physical Therapy Treatment Note  [facility]     Patient Name: Tawny Shin  : 1955  MRN: [id number]  Today's Date: 2024      Visit Dx:     ICD-10-CM ICD-9-CM   1. Acute UTI (urinary tract infection)  N39.0 599.0   2. Altered mental status, unspecified altered mental status type  R41.82 780.97   3. Open wound of right buttock, sequela  S31.819S 906.0   4. Cyst of buttocks  L72.9 706.2   5. Dysphagia, unspecified type  R13.10 787.20   6. Impaired mobility [Z74.09]  Z74.09 799.89     Patient Active Problem List   Diagnosis    T12 compression fracture, initial encounter    Chronic embolism and thrombosis of unspecified deep veins of left lower extremity    Chronic anticoagulation    Iron deficiency anemia    Osteoporosis    E coli bacteremia    Epidural hematoma    Pleural effusion, left    Functional neurological symptom disorder with weakness or paralysis    Class 1 obesity due to excess calories with serious comorbidity and body mass index (BMI) of 33.0 to 33.9 in adult    Rheumatoid arthritis involving multiple sites    Chronic heart failure with preserved ejection fraction    Venous insufficiency    Coronary artery disease involving native coronary artery of native heart without angina pectoris    Sick sinus syndrome    Essential hypertension    Pressure injury of skin of heel    Chronic pain    Anemia of chronic disease    Cholelithiasis    Pressure injury of skin of buttock    Near functional paraplegia    Skin cancer    Squamous cell carcinoma of back    Hematoma    Right-sided chest wall pain    Hyperlipidemia LDL goal <70    Malodorous urine    Stage 3b chronic kidney disease    Cyst of buttocks    Sepsis due to Escherichia coli without acute organ dysfunction    Generalized weakness    Paralysis    History of urinary retention    Bacteremia due to Enterobacter species    Bacteremia    Hypothyroidism    Abnormal CT of the abdomen    Presence of cardiac pacemaker    Altered mental  "status    UTI (urinary tract infection)    Esophageal dysphagia     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    Elevated cholesterol     Eustachian tube dysfunction     Heart disease     Herpes simplex     History of transfusion     Hyperlipidemia     Hypertension     Hypothyroidism 12/23/2020    Intrinsic asthma 12/23/2020    Knee dislocation     Labral tear of right hip joint     Laryngitis sicca     Laryngitis, chronic     Left carotid bruit 03/09/2016    MI (myocardial infarction)     Myalgia due to statin 06/25/2019    Open wound of right hip 09/14/2021    Osteomyelitis of right femur 07/06/2021    Otorrhea     Pacemaker 11/17/2016    Primary osteoarthritis of left knee 12/23/2020    Psoriasis vulgaris 12/23/2020    S/P coronary artery stent placement 03/09/2016    Sensorineural hearing loss     Seropositive rheumatoid arthritis of multiple sites 12/27/2019    Overview:  -myochrysine '93-'96 -methotrexate '96--->11/98;r/s  restarted 2/99--> 8/14 (anemia) -sulfasalazine- not effective -penicillamine 6/98-->10/98; no effect -leflunomide 11/98--> - Humira '13-->didn't take - Enbrel 12/14-->3/15- no effect!   Last Assessment & Plan:  - \"aching all over\" because she had to be off her anti-rheumatic drugs for 2 weeks in preparation for her R knee surgery - he    Sick sinus syndrome 12/27/2019    Sjogren's disease     Spondylolisthesis of lumbar region 01/17/2018    Syncope, recurrent 02/08/2021    " Urinary tract infection     UTI (urinary tract infection) 04/19/2024     Past Surgical History:   Procedure Laterality Date    A-V CARDIAC PACEMAKER INSERTION  2016    ATRIAL CARDIAC PACEMAKER INSERTION      CARDIAC CATHETERIZATION      CATARACT EXTRACTION      CERVICAL CORPECTOMY N/A 3/3/2021    Procedure: CERVICAL 6 CORPECTOMY WITH TITANIUM CAGE WITH NEURO MONITORING;  Surgeon: Bandar Shea MD;  Location:  [facility] OR;  Service: Neurosurgery;  Laterality: N/A;    COLONOSCOPY  11/08/2011    One fold in the ascending colon which showed ulcer otherwise normal exam    COLONOSCOPY  11/12/2004    Normal exam repeat in five years    CORONARY ANGIOPLASTY WITH STENT PLACEMENT      X 2; 2013 & 2014    ENDOSCOPY  07/10/2014    Normal exam    ENDOSCOPY N/A 5/30/2024    Procedure: ESOPHAGOGASTRODUODENOSCOPY WITH ANESTHESIA;  Surgeon: Taiwo Sanchez MD;  Location: [facility] ENDOSCOPY;  Service: Gastroenterology;  Laterality: N/A;  preop; dysphagia  postop: balloon dilation  PCP Jesus Manuel jeff    FLAP LEG Right 9/14/2021    Procedure: RIGHT GLUTEAL FASCIOCUTANEOUS ADVANCEMENT FLAP AND RIGHT TENSOR FASCIAL JESSICA FLAP;  Surgeon: Amadeo Turner MD;  Location:  [facility] OR;  Service: Plastics;  Laterality: Right;    HIP ABDUCTION TENOTOMY BILATERAL Right 1/14/2021    Procedure: RIGHT HIP GLUTEUS MEDLUS / MINIMUS REPAIR, POSSIBLE ACHILLES ALLOGRAFT;  Surgeon: Nino Carlson MD;  Location:  [facility] OR;  Service: Orthopedics;  Laterality: Right;    INCISION AND DRAINAGE ABSCESS Right 6/4/2022    Procedure: INCISION AND DRAINAGE ABSCESS right hip;  Surgeon: Magda Salcido MD;  Location:  [facility] OR;  Service: General;  Laterality: Right;    INCISION AND DRAINAGE ABSCESS Right 6/10/2022    Procedure: RIGHT HIP INCISION AND DRAINAGE. MD NEEDS 3L VANC IRRIGATION, CURRETTES, DAICANS, KERLEX ROLLS;  Surgeon: Amadeo Turner MD;  Location:  [facility] OR;  Service: Plastics;  Laterality: Right;    INCISION AND DRAINAGE HIP Right 2/9/2021     Procedure: HIP INCISION AND DRAINAGE;  Surgeon: Nino Carlson MD;  Location:  [facility] OR;  Service: Orthopedics;  Laterality: Right;    INCISION AND DRAINAGE LEG Right 10/24/2021    Procedure: INCISION AND DRAINAGE LOWER EXTREMITY;  Surgeon: Amadeo Turner MD;  Location:  [facility];  Service: Plastics;  Laterality: Right;    INCISION AND DRAINAGE OF WOUND Right 7/8/2021    Procedure: INCISION AND DRAINAGE WOUND RIGHT HIP;  Surgeon: James Huntley MD;  Location:  [facility] OR;  Service: Orthopedics;  Laterality: Right;    JOINT REPLACEMENT      KYPHOPLASTY WITH BIOPSY Bilateral 10/26/2021    Procedure: THOARCIC 12 KYPHOPLASTY WITH BIOPSY;  Surgeon: Bandar Shea MD;  Location:  [facility] OR;  Service: Neurosurgery;  Laterality: Bilateral;    LEG DEBRIDEMENT Right 9/14/2021    Procedure: DEBRIDEMENT OF RIGHT HIP WOUND, RIGHT GLUTEAL FASCIOCUTANEOUS ADVANCEMENT FLAP AND RIGHT TENSOR FASCIAL JESSICA FLAP;  Surgeon: Amadeo Turner MD;  Location:  [facility] OR;  Service: Plastics;  Laterality: Right;    LUMBAR DISCECTOMY Right 3/23/2021    Procedure: LUMBAR DISCECTOMY MICRO, Lumbar 1/2 right;  Surgeon: Bandar Shea MD;  Location:  [facility] OR;  Service: Neurosurgery;  Laterality: Right;    LUMBAR FUSION N/A 1/19/2018    Procedure: L3-4,L4-5 DECOMPRESSION, POSTERIOR SPINAL FUSION WITH INSTRUMENTATION;  Surgeon: Fortino Oropeza MD;  Location: [facility] OR;  Service:     LUMBAR LAMINECTOMY WITH FUSION Left 1/17/2018    Procedure: LEFT L3-4 L4-5 LATERAL LUMBAR INTERBODY FUSION;  Surgeon: Fortino Oropeza MD;  Location:  [facility] OR;  Service:     MASS EXCISION Right 4/23/2024    Procedure: RIGHT BUTTOCK MASS EXCISION;  Surgeon: Moises Keyes MD;  Location:  [facility] OR;  Service: General;  Laterality: Right;    MYRINGOTOMY W/ TUBES  09/04/2014    TUBES NO LONGER IN PLACE    OTHER SURGICAL HISTORY      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 so hardware was removed and spacers were placed    REPLACEMENT TOTAL KNEE Right       PT Assessment (Last 12 Hours)       PT Evaluation and Treatment       Row Name 06/20/24 0938 06/20/24 0829       Physical Therapy Time and Intention    Subjective Information no complaints  - --    Document Type therapy note (daily note)  - --    Mode of Treatment physical therapy  - --    Session Not Performed -- other (see comments)  -    Comment, Session Not Performed -- pt eating breakfast. Will check back.  -      Row Name 06/20/24 0938          General Information    Existing Precautions/Restrictions fall  -       Row Name 06/20/24 0938          Pain    Pretreatment Pain Rating 0/10 - no pain  -     Posttreatment Pain Rating 0/10 - no pain  -       Row Name 06/20/24 0938          Bed Mobility    Scooting/Bridging [place] (Bed Mobility) moderate assist (50% patient effort)  -     Supine-Sit [place] (Bed Mobility) verbal cues;minimum assist (75% patient effort);moderate assist (50% patient effort)  -     Assistive Device (Bed Mobility) head of bed elevated;bed rails;draw sheet  -[facility] Name 06/20/24 0938          Sit-Stand Transfer    Sit-Stand [place] (Transfers) verbal cues;minimum assist (75% patient effort);2 person assist  -     Assistive Device (Sit-Stand Transfers) walker, front-wheeled  -       Row Name 06/20/24 0938          Stand-Sit Transfer    Stand-Sit [place] (Transfers) verbal cues;minimum assist (75% patient effort);2 person assist  -[facility] Name 06/20/24 0938          Toilet Transfer    Type (Toilet Transfer) stand pivot/stand step  -     [place] Level (Toilet Transfer) verbal cues;minimum assist (75% patient effort);2 person assist  -     Assistive Device (Toilet Transfer) commode, bedside without drop arms;walker, front-wheeled  -     Comment, (Toilet Transfer) steps to BSC and then steps to chair. Left knee buckling during steps to chair.  -       Row Name             Wound 04/23/24 0936 Right gluteal Incision    Wound -  Properties Group Placement Date: 04/23/24  -EP Placement Time: 0936  -EP Present on Original Admission: N  -EP Side: Right  -EP Location: gluteal  -EP Primary Wound Type: Incision  -EP    Retired Wound - Properties Group Placement Date: 04/23/24  -EP Placement Time: 0936  -EP Present on Original Admission: N  -EP Side: Right  -EP Location: gluteal  -EP Primary Wound Type: Incision  -EP    Retired Wound - Properties Group Date first assessed: 04/23/24  -EP Time first assessed: 0936  -EP Present on Original Admission: N  -EP Side: Right  -EP Location: gluteal  -EP Primary Wound Type: Incision  -EP      Row Name 06/20/24 0938          Plan of Care Review    Plan of Care Reviewed With patient  -     Progress no change  -     Outcome Evaluation Pt. agreeable to therapy. Pt. needing to use BR and MD as requested she attempt to use BSC. Pt. needed Min-Mod assist to get to EOB. She was MIN x 2 to stand and took small steps to BSC with RWX. Pt. unable to urinate while up on BSC. She had already gone in brief prior to getting up. Pt. stood again with MIN x 2, but did need Mod x 2 to take steps to chair ( 3'). Left knee buckling during transfer to chair. Unable to place R heel flat on floor. Pt. left resting in recliner with caregiver present. Will continue to work with pt on transfers while she is here.  -       Row Name 06/20/24 0938          Positioning and Restraints    Pre-Treatment Position in bed  -     Post Treatment Position chair  -MF     In Chair reclined;call light within reach;encouraged to call for assist;with family/caregiver  -               User Key  (r) = Recorded By, (t) = Taken By, (c) = Cosigned By      Initials Name Provider Type    Cristina Shine PTA Physical Therapist Assistant    Sonny Thompson RN Registered Nurse                    Physical Therapy Education       Title: PT OT SLP Therapies (In Progress)       Topic: Physical Therapy (Done)       Point: Mobility training (Done)        Learning Progress Summary             Patient Acceptance, E, VU by MS at 6/18/2024 1502    Comment: role of PT in her care                         Point: Home exercise program (Done)       Learning Progress Summary             Patient Acceptance, E,D,TB, VU,DU,NR by  at 6/15/2024 1514    Acceptance, E,TB,D, VU,DU,NR by  at 6/14/2024 1550                         Point: Body mechanics (Done)       Learning Progress Summary             Patient Acceptance, E,D,TB, VU,DU,NR by  at 6/15/2024 1514    Acceptance, E,TB,D, VU,DU,NR by  at 6/14/2024 1550                         Point: Precautions (Done)       Learning Progress Summary             Patient Acceptance, E,D,TB, VU,DU,NR by  at 6/15/2024 1514    Acceptance, E,TB,D, VU,DU,NR by  at 6/14/2024 1550                                         User Key       Initials Effective Dates Name Provider Type Discipline    MS 07/11/23 -  Nicole Olson, PT, DPT, NCS Physical Therapist PT     10/17/23 -  Stephanie Doll, RN Registered Nurse Nurse                  PT Recommendation and Plan     Plan of Care Reviewed With: patient  Progress: no change  Outcome Evaluation: Pt. agreeable to therapy. Pt. needing to use BR and MD as requested she attempt to use BSC. Pt. needed Min-Mod assist to get to EOB. She was MIN x 2 to stand and took small steps to BSC with RWX. Pt. unable to urinate while up on BSC. She had already gone in brief prior to getting up. Pt. stood again with MIN x 2, but did need Mod x 2 to take steps to chair ( 3'). Left knee buckling during transfer to chair. Unable to place R heel flat on floor. Pt. left resting in recliner with caregiver present. Will continue to work with pt on transfers while she is here.   Outcome Measures       Row Name 06/20/24 0938 06/19/24 1300 06/18/24 1400       How much help from another person do you currently need...    Turning from your back to your side while in flat bed without using bedrails? 3  - 3  - --     Moving from lying on back to sitting on the side of a flat bed without bedrails? 3  - 3  -AH --    Moving to and from a bed to a chair (including a wheelchair)? 2  - 2  -AH --    Standing up from a chair using your arms (e.g., wheelchair, bedside chair)? 2  - 2  -AH --    Climbing 3-5 steps with a railing? 1  - 1  - --    To walk in hospital room? 1  - 1  -AH --    AM-PAC 6 Clicks Score (PT) 12  - 12  -AH --    Highest Level of Mobility Goal 4 --> Transfer to chair/commode  - 4 --> Transfer to chair/commode  - --       How much help from another is currently needed...    Putting on and taking off regular lower body clothing? -- -- 1  -TS    Bathing (including washing, rinsing, and drying) -- -- 2  -TS    Toileting (which includes using toilet bed pan or urinal) -- -- 2  -TS    Putting on and taking off regular upper body clothing -- -- 3  -TS    Taking care of personal grooming (such as brushing teeth) -- -- 3  -TS    Eating meals -- -- 4  -TS    AM-PAC 6 Clicks Score (OT) -- -- 15  -TS       Functional Assessment    Outcome Measure Options AM-PAC 6 Clicks Basic Mobility (PT)  -[facility] 6 Clicks Basic Mobility (PT)  - --      Row Name 06/17/24 1500             How much help from another is currently needed...    Putting on and taking off regular lower body clothing? 1  -EC      Bathing (including washing, rinsing, and drying) 2  -EC      Toileting (which includes using toilet bed pan or urinal) 2  -EC      Putting on and taking off regular upper body clothing 3  -EC      Taking care of personal grooming (such as brushing teeth) 3  -EC      Eating meals 4  -EC      AM-PAC 6 Clicks Score (OT) 15  -EC         Functional Assessment    Outcome Measure Options AM-PAC 6 Clicks Daily Activity (OT)  -EC                User Key  (r) = Recorded By, (t) = Taken By, (c) = Cosigned By      Initials Name Provider Type     Olga Chambers, SERENA Physical Therapist Assistant     Carolee Giordano COTA  Occupational Therapist Assistant    Cristina Shine PTA Physical Therapist Assistant    Ashley Nash, OTR/L Occupational Therapist                     Time Calculation:    PT Charges       Row Name 06/20/24 1002             Time Calculation    Start Time 0938  -MF      Stop Time 1002  -MF      Time Calculation (min) 24 min  -MF      PT Received On 06/20/24  -MF         Time Calculation- PT    Total Timed Code Minutes- PT 24 minute(s)  -MF         Timed Charges    02615 - PT Therapeutic Activity Minutes 24  -MF         Total Minutes    Timed Charges Total Minutes 24  -MF       Total Minutes 24  -MF                User Key  (r) = Recorded By, (t) = Taken By, (c) = Cosigned By      Initials Name Provider Type    Cristina Shine PTA Physical Therapist Assistant                  Therapy Charges for Today       Code Description Service Date Service Provider Modifiers Qty    [id number] HC PT THERAPEUTIC ACT EA 15 MIN 6/20/2024 Cristina Gonsalves PTA GP 2            PT G-Codes  Outcome Measure Options: AM-PAC 6 Clicks Basic Mobility (PT)  AM-PAC 6 Clicks Score (PT): 12  AM-PAC 6 Clicks Score (OT): 15    Cristina Gonsalves PTA  6/20/2024

## 2024-06-20 NOTE — PLAN OF CARE
Goal Outcome Evaluation:  Plan of Care Reviewed With: patient, caregiver        Progress: no change  Outcome Evaluation: OT tx completed. Pt in fowlers & wanting to brush her teeth sitting EOB. Pt performs bed mob with min-modA. She maintained sitting balance with CGA while performing oral hygiene with setup. Pt began to feel dizzy & requested to lay back down. BP taken in supine & was 112/54. Once in fowlers, she performed 2 x 10 reps B bicep curls w 2lb DB to increase her strength for ADLs & fxn transfers. Cont OT tx

## 2024-06-21 ENCOUNTER — ANESTHESIA (OUTPATIENT)
Dept: PERIOP | Facility: HOSPITAL | Age: 69
End: 2024-06-21
Payer: MEDICARE

## 2024-06-21 ENCOUNTER — APPOINTMENT (OUTPATIENT)
Dept: GENERAL RADIOLOGY | Facility: HOSPITAL | Age: 69
DRG: 659 | End: 2024-06-21
Payer: MEDICARE

## 2024-06-21 ENCOUNTER — HOME CARE VISIT (OUTPATIENT)
Dept: HOME HEALTH SERVICES | Facility: CLINIC | Age: 69
End: 2024-06-21
Payer: MEDICARE

## 2024-06-21 ENCOUNTER — ANESTHESIA EVENT (OUTPATIENT)
Dept: PERIOP | Facility: HOSPITAL | Age: 69
End: 2024-06-21
Payer: MEDICARE

## 2024-06-21 ENCOUNTER — HOME CARE VISIT (OUTPATIENT)
Dept: HOME HEALTH SERVICES | Facility: HOME HEALTHCARE | Age: 69
End: 2024-06-21
Payer: MEDICARE

## 2024-06-21 LAB
ANION GAP SERPL CALCULATED.3IONS-SCNC: 7 MMOL/L (ref 5–15)
BUN SERPL-MCNC: 18 MG/DL (ref 8–23)
BUN/CREAT SERPL: 17.8 (ref 7–25)
CALCIUM SPEC-SCNC: 9 MG/DL (ref 8.6–10.5)
CHLORIDE SERPL-SCNC: 101 MMOL/L (ref 98–107)
CO2 SERPL-SCNC: 29 MMOL/L (ref 22–29)
CREAT SERPL-MCNC: 1.01 MG/DL (ref 0.57–1)
DEPRECATED RDW RBC AUTO: 62 FL (ref 37–54)
EGFRCR SERPLBLD CKD-EPI 2021: 60 ML/MIN/1.73
ERYTHROCYTE [DISTWIDTH] IN BLOOD BY AUTOMATED COUNT: 18.6 % (ref 12.3–15.4)
GLUCOSE SERPL-MCNC: 98 MG/DL (ref 65–99)
HCT VFR BLD AUTO: 25.4 % (ref 34–46.6)
HGB BLD-MCNC: 7.8 G/DL (ref 12–15.9)
MCH RBC QN AUTO: 28.6 PG (ref 26.6–33)
MCHC RBC AUTO-ENTMCNC: 30.7 G/DL (ref 31.5–35.7)
MCV RBC AUTO: 93 FL (ref 79–97)
PLATELET # BLD AUTO: 200 10*3/MM3 (ref 140–450)
PMV BLD AUTO: 10.3 FL (ref 6–12)
POTASSIUM SERPL-SCNC: 3.8 MMOL/L (ref 3.5–5.2)
RBC # BLD AUTO: 2.73 10*6/MM3 (ref 3.77–5.28)
SODIUM SERPL-SCNC: 137 MMOL/L (ref 136–145)
WBC NRBC COR # BLD AUTO: 6.22 10*3/MM3 (ref 3.4–10.8)

## 2024-06-21 PROCEDURE — 25010000002 CEFEPIME PER 500 MG: Performed by: UROLOGY

## 2024-06-21 PROCEDURE — 52356 CYSTO/URETERO W/LITHOTRIPSY: CPT | Performed by: UROLOGY

## 2024-06-21 PROCEDURE — 0TC78ZZ EXTIRPATION OF MATTER FROM LEFT URETER, VIA NATURAL OR ARTIFICIAL OPENING ENDOSCOPIC: ICD-10-PCS | Performed by: UROLOGY

## 2024-06-21 PROCEDURE — 25810000003 LACTATED RINGERS PER 1000 ML: Performed by: ANESTHESIOLOGY

## 2024-06-21 PROCEDURE — 76000 FLUOROSCOPY <1 HR PHYS/QHP: CPT

## 2024-06-21 PROCEDURE — 99232 SBSQ HOSP IP/OBS MODERATE 35: CPT | Performed by: INTERNAL MEDICINE

## 2024-06-21 PROCEDURE — C1769 GUIDE WIRE: HCPCS | Performed by: UROLOGY

## 2024-06-21 PROCEDURE — 85027 COMPLETE CBC AUTOMATED: CPT | Performed by: FAMILY MEDICINE

## 2024-06-21 PROCEDURE — 25010000002 ONDANSETRON PER 1 MG: Performed by: NURSE ANESTHETIST, CERTIFIED REGISTERED

## 2024-06-21 PROCEDURE — C2617 STENT, NON-COR, TEM W/O DEL: HCPCS | Performed by: UROLOGY

## 2024-06-21 PROCEDURE — 74018 RADEX ABDOMEN 1 VIEW: CPT

## 2024-06-21 PROCEDURE — 25010000002 FENTANYL CITRATE (PF) 100 MCG/2ML SOLUTION: Performed by: NURSE ANESTHETIST, CERTIFIED REGISTERED

## 2024-06-21 PROCEDURE — 80048 BASIC METABOLIC PNL TOTAL CA: CPT | Performed by: FAMILY MEDICINE

## 2024-06-21 PROCEDURE — 0T778DZ DILATION OF LEFT URETER WITH INTRALUMINAL DEVICE, VIA NATURAL OR ARTIFICIAL OPENING ENDOSCOPIC: ICD-10-PCS | Performed by: UROLOGY

## 2024-06-21 PROCEDURE — 25010000002 CEFEPIME PER 500 MG: Performed by: FAMILY MEDICINE

## 2024-06-21 PROCEDURE — C1758 CATHETER, URETERAL: HCPCS | Performed by: UROLOGY

## 2024-06-21 PROCEDURE — 25010000002 PROPOFOL 10 MG/ML EMULSION: Performed by: NURSE ANESTHETIST, CERTIFIED REGISTERED

## 2024-06-21 PROCEDURE — 74420 UROGRAPHY RTRGR +-KUB: CPT | Performed by: UROLOGY

## 2024-06-21 PROCEDURE — 99222 1ST HOSP IP/OBS MODERATE 55: CPT | Performed by: SURGERY

## 2024-06-21 PROCEDURE — 87086 URINE CULTURE/COLONY COUNT: CPT | Performed by: UROLOGY

## 2024-06-21 PROCEDURE — 25510000001 IOPAMIDOL 61 % SOLUTION: Performed by: UROLOGY

## 2024-06-21 DEVICE — URETERAL STENT WITH SIDE HOLES 7FX24CM
Type: IMPLANTABLE DEVICE | Site: URETER | Status: FUNCTIONAL
Brand: TRIA™ SOFT

## 2024-06-21 RX ORDER — MAGNESIUM HYDROXIDE 1200 MG/15ML
LIQUID ORAL AS NEEDED
Status: DISCONTINUED | OUTPATIENT
Start: 2024-06-21 | End: 2024-06-21 | Stop reason: HOSPADM

## 2024-06-21 RX ORDER — DROPERIDOL 2.5 MG/ML
0.62 INJECTION, SOLUTION INTRAMUSCULAR; INTRAVENOUS ONCE AS NEEDED
Status: DISCONTINUED | OUTPATIENT
Start: 2024-06-21 | End: 2024-06-21 | Stop reason: HOSPADM

## 2024-06-21 RX ORDER — PROPOFOL 10 MG/ML
VIAL (ML) INTRAVENOUS AS NEEDED
Status: DISCONTINUED | OUTPATIENT
Start: 2024-06-21 | End: 2024-06-21 | Stop reason: SURG

## 2024-06-21 RX ORDER — ONDANSETRON 2 MG/ML
INJECTION INTRAMUSCULAR; INTRAVENOUS AS NEEDED
Status: DISCONTINUED | OUTPATIENT
Start: 2024-06-21 | End: 2024-06-21 | Stop reason: SURG

## 2024-06-21 RX ORDER — SUCCINYLCHOLINE/SOD CL,ISO/PF 200MG/10ML
SYRINGE (ML) INTRAVENOUS AS NEEDED
Status: DISCONTINUED | OUTPATIENT
Start: 2024-06-21 | End: 2024-06-21 | Stop reason: SURG

## 2024-06-21 RX ORDER — FENTANYL CITRATE 50 UG/ML
INJECTION, SOLUTION INTRAMUSCULAR; INTRAVENOUS AS NEEDED
Status: DISCONTINUED | OUTPATIENT
Start: 2024-06-21 | End: 2024-06-21 | Stop reason: SURG

## 2024-06-21 RX ORDER — SODIUM CHLORIDE 0.9 % (FLUSH) 0.9 %
3 SYRINGE (ML) INJECTION EVERY 12 HOURS SCHEDULED
Status: DISCONTINUED | OUTPATIENT
Start: 2024-06-21 | End: 2024-06-21 | Stop reason: HOSPADM

## 2024-06-21 RX ORDER — SODIUM CHLORIDE, SODIUM LACTATE, POTASSIUM CHLORIDE, CALCIUM CHLORIDE 600; 310; 30; 20 MG/100ML; MG/100ML; MG/100ML; MG/100ML
100 INJECTION, SOLUTION INTRAVENOUS CONTINUOUS
Status: DISCONTINUED | OUTPATIENT
Start: 2024-06-21 | End: 2024-06-21

## 2024-06-21 RX ORDER — FENTANYL CITRATE 50 UG/ML
50 INJECTION, SOLUTION INTRAMUSCULAR; INTRAVENOUS
Status: DISCONTINUED | OUTPATIENT
Start: 2024-06-21 | End: 2024-06-21 | Stop reason: HOSPADM

## 2024-06-21 RX ORDER — LIDOCAINE HYDROCHLORIDE 20 MG/ML
INJECTION, SOLUTION EPIDURAL; INFILTRATION; INTRACAUDAL; PERINEURAL AS NEEDED
Status: DISCONTINUED | OUTPATIENT
Start: 2024-06-21 | End: 2024-06-21 | Stop reason: SURG

## 2024-06-21 RX ORDER — FLUMAZENIL 0.1 MG/ML
0.2 INJECTION INTRAVENOUS AS NEEDED
Status: DISCONTINUED | OUTPATIENT
Start: 2024-06-21 | End: 2024-06-21 | Stop reason: HOSPADM

## 2024-06-21 RX ORDER — SODIUM CHLORIDE 0.9 % (FLUSH) 0.9 %
3-10 SYRINGE (ML) INJECTION AS NEEDED
Status: DISCONTINUED | OUTPATIENT
Start: 2024-06-21 | End: 2024-06-21 | Stop reason: HOSPADM

## 2024-06-21 RX ORDER — ONDANSETRON 2 MG/ML
4 INJECTION INTRAMUSCULAR; INTRAVENOUS ONCE AS NEEDED
Status: DISCONTINUED | OUTPATIENT
Start: 2024-06-21 | End: 2024-06-21 | Stop reason: HOSPADM

## 2024-06-21 RX ORDER — ROCURONIUM BROMIDE 10 MG/ML
INJECTION, SOLUTION INTRAVENOUS AS NEEDED
Status: DISCONTINUED | OUTPATIENT
Start: 2024-06-21 | End: 2024-06-21 | Stop reason: SURG

## 2024-06-21 RX ORDER — SODIUM CHLORIDE 9 MG/ML
40 INJECTION, SOLUTION INTRAVENOUS AS NEEDED
Status: DISCONTINUED | OUTPATIENT
Start: 2024-06-21 | End: 2024-06-21 | Stop reason: HOSPADM

## 2024-06-21 RX ORDER — HYDROCODONE BITARTRATE AND ACETAMINOPHEN 10; 325 MG/1; MG/1
1 TABLET ORAL EVERY 4 HOURS PRN
Status: DISCONTINUED | OUTPATIENT
Start: 2024-06-21 | End: 2024-06-21 | Stop reason: HOSPADM

## 2024-06-21 RX ORDER — HYDROCODONE BITARTRATE AND ACETAMINOPHEN 5; 325 MG/1; MG/1
1 TABLET ORAL EVERY 4 HOURS PRN
Status: DISCONTINUED | OUTPATIENT
Start: 2024-06-21 | End: 2024-06-21 | Stop reason: HOSPADM

## 2024-06-21 RX ORDER — NALOXONE HCL 0.4 MG/ML
0.4 VIAL (ML) INJECTION AS NEEDED
Status: DISCONTINUED | OUTPATIENT
Start: 2024-06-21 | End: 2024-06-21 | Stop reason: HOSPADM

## 2024-06-21 RX ORDER — BUPIVACAINE HCL/0.9 % NACL/PF 0.125 %
PLASTIC BAG, INJECTION (ML) EPIDURAL AS NEEDED
Status: DISCONTINUED | OUTPATIENT
Start: 2024-06-21 | End: 2024-06-21 | Stop reason: SURG

## 2024-06-21 RX ORDER — LABETALOL HYDROCHLORIDE 5 MG/ML
5 INJECTION, SOLUTION INTRAVENOUS
Status: DISCONTINUED | OUTPATIENT
Start: 2024-06-21 | End: 2024-06-21 | Stop reason: HOSPADM

## 2024-06-21 RX ADMIN — CEFEPIME 2000 MG: 2 INJECTION, POWDER, FOR SOLUTION INTRAVENOUS at 23:59

## 2024-06-21 RX ADMIN — Medication 100 MCG: at 11:34

## 2024-06-21 RX ADMIN — ONDANSETRON 4 MG: 2 INJECTION INTRAMUSCULAR; INTRAVENOUS at 11:33

## 2024-06-21 RX ADMIN — Medication 100 MCG: at 11:32

## 2024-06-21 RX ADMIN — SODIUM CHLORIDE, POTASSIUM CHLORIDE, SODIUM LACTATE AND CALCIUM CHLORIDE 100 ML/HR: 600; 310; 30; 20 INJECTION, SOLUTION INTRAVENOUS at 10:21

## 2024-06-21 RX ADMIN — ROCURONIUM 10 MG: 50 INJECTION, SOLUTION INTRAVENOUS at 11:02

## 2024-06-21 RX ADMIN — PROPOFOL 120 MG: 10 INJECTION, EMULSION INTRAVENOUS at 11:02

## 2024-06-21 RX ADMIN — APIXABAN 5 MG: 5 TABLET, FILM COATED ORAL at 23:09

## 2024-06-21 RX ADMIN — FENTANYL CITRATE 50 MCG: 50 INJECTION, SOLUTION INTRAMUSCULAR; INTRAVENOUS at 11:37

## 2024-06-21 RX ADMIN — Medication 150 MCG: at 11:19

## 2024-06-21 RX ADMIN — LIDOCAINE HYDROCHLORIDE 100 MG: 20 INJECTION, SOLUTION EPIDURAL; INFILTRATION; INTRACAUDAL; PERINEURAL at 11:02

## 2024-06-21 RX ADMIN — Medication 150 MCG: at 11:13

## 2024-06-21 RX ADMIN — Medication 100 MG: at 11:02

## 2024-06-21 RX ADMIN — METHENAMINE HIPPURATE 1 G: 1000 TABLET ORAL at 23:10

## 2024-06-21 RX ADMIN — Medication 10 ML: at 23:10

## 2024-06-21 RX ADMIN — PROPOFOL 80 MG: 10 INJECTION, EMULSION INTRAVENOUS at 11:26

## 2024-06-21 RX ADMIN — Medication 100 MCG: at 11:21

## 2024-06-21 RX ADMIN — CEFEPIME 2000 MG: 2 INJECTION, POWDER, FOR SOLUTION INTRAVENOUS at 13:52

## 2024-06-21 RX ADMIN — Medication 100 MCG: at 11:27

## 2024-06-21 NOTE — PROGRESS NOTES
[facility] Medicine Services  INPATIENT PROGRESS NOTE    Patient Name: Tawny Shin  Date of Admission: 6/12/2024  Today's Date: 06/21/24  Length of Stay: 0  Primary Care Physician: Moises Oliveira MD    Subjective   Chief Complaint: Recurrent UTI, gallstone, kidney stone  HPI   Status post lithotripsy and stent placement.  Patient is doing well.  CT shows gallstone at the neck of gallbladder, patient does have some tenderness on palpation right upper quadrant.  Plan to consult general surgery and get an ultrasound of the gallbladder.  Patient is on 2 L oxygen.  Blood pressure slightly high, afebrile.  Creatinine stable.  Hemoglobin stable.  White blood cells normal.    Review of Systems   Constitutional:  Positive for activity change, appetite change and fatigue. Negative for chills and fever.   HENT:  Negative for hearing loss, nosebleeds, tinnitus and trouble swallowing.    Eyes:  Negative for visual disturbance.   Respiratory:  Negative for cough, chest tightness, shortness of breath and wheezing.    Cardiovascular:  Negative for chest pain, palpitations and leg swelling.   Gastrointestinal:  Negative for abdominal distention, abdominal pain, blood in stool, constipation, diarrhea, nausea and vomiting.   Endocrine: Negative for cold intolerance, heat intolerance, polydipsia, polyphagia and polyuria.   Genitourinary:  Negative for decreased urine volume, difficulty urinating, dysuria, flank pain, frequency and hematuria.   Musculoskeletal:  Positive for arthralgias, gait problem and myalgias. Negative for joint swelling.   Skin:  Negative for rash.   Allergic/Immunologic: Negative for immunocompromised state.   Neurological:  Positive for weakness. Negative for dizziness, syncope, light-headedness and headaches.   Hematological:  Negative for adenopathy. Does not bruise/bleed easily.   Psychiatric/Behavioral:  Negative for confusion and sleep disturbance. The patient  is not nervous/anxious.    All pertinent negatives and positives are as above. All other systems have been reviewed and are negative unless otherwise stated.     Objective    Temp:  [98 °F (36.7 °C)-98.2 °F (36.8 °C)] 98.1 °F (36.7 °C)  Heart Rate:  [73-83] 78  Resp:  [16-18] 16  BP: (106-126)/(41-64) 126/46  Physical Exam  Vitals and nursing note reviewed.   Constitutional:       Comments: Chronically ill.  Deconditioning.   HENT:      Head: Normocephalic.   Eyes:      Conjunctiva/sclera: Conjunctivae normal.      Pupils: Pupils are equal, round, and reactive to light.   Cardiovascular:      Rate and Rhythm: Normal rate and regular rhythm.      Heart sounds: Normal heart sounds.   Pulmonary:      Effort: Pulmonary effort is normal. No respiratory distress.      Breath sounds: Normal breath sounds.   Abdominal:      General: Bowel sounds are normal. There is no distension.      Palpations: Abdomen is soft.      Comments: Obesity.  Right upper quadrant tenderness with palpation.    Musculoskeletal:         General: No swelling.      Cervical back: Neck supple.   Skin:     General: Skin is warm and dry.      Capillary Refill: Capillary refill takes 2 to 3 seconds.      Findings: No rash.   Neurological:      Mental Status: She is alert and oriented to person, place, and time.      Motor: Weakness present.      Coordination: Coordination abnormal.      Gait: Gait abnormal.   Psychiatric:         Mood and Affect: Mood normal.         Behavior: Behavior normal.         Thought Content: Thought content normal.          Results Review:  I have reviewed the labs, radiology results, and diagnostic studies.    Laboratory Data:   Results from last 7 days   Lab Units 06/21/24  0428 06/20/24  0354 06/19/24  0122   WBC 10*3/mm3 6.22 6.32 6.69   HEMOGLOBIN g/dL 7.8* 7.5* 7.4*   HEMATOCRIT % 25.4* 25.2* 25.3*   PLATELETS 10*3/mm3 200 192 168        Results from last 7 days   Lab Units 06/21/24  0428 06/20/24  0354 06/19/24  0018    SODIUM mmol/L 137 133* 135*   POTASSIUM mmol/L 3.8 3.4* 3.7   CHLORIDE mmol/L 101 97* 101   CO2 mmol/L 29.0 28.0 26.0   BUN mg/dL 18 22 23   CREATININE mg/dL 1.01* 1.07* 1.16*   CALCIUM mg/dL 9.0 8.6 8.3*   GLUCOSE mg/dL 98 97 120*       Culture Data:   Urine Culture   Date Value Ref Range Status   06/16/2024 25,000 CFU/mL Enterobacter cloacae complex CRE (A)  Final     Comment:       Consider infectious disease consult.  Susceptibility results may not correlate to clinical outcomes.  Carbapenem resistant Enterobacteriaceae, patient may be an isolation risk.  No carbapenemase detected.       Radiology Data:   Imaging Results (Last 24 Hours)       Procedure Component Value Units Date/Time    CT Abdomen Pelvis Without Contrast [[id number]] Collected: 06/20/24 1333     Updated: 06/20/24 1350    Narrative:      CT ABDOMEN PELVIS WO CONTRAST- 6/20/2024 11:43 AM     HISTORY: Rule out kidney stone.; N39.0-Urinary tract infection, site not  specified; R41.82-Altered mental status, unspecified;  S31.819S-Unspecified open wound of right buttock, sequela;  L72.9-Follicular cyst of the skin and subcutaneous tissue, unspecified;  R13.10-Dysphagia, unspecified; Z74.09-Other reduced mobility      COMPARISON: Previous CT pelvis with contrast dated 4/21/2024     DLP: 633.64 mGy.cm . All CT scans are performed using dose optimization  techniques as appropriate to the performed exam and including at least  one of the following: Automated exposure control, adjustment of the mA  and/or kV according to size, and the use of the iterative reconstruction  technique.     TECHNIQUE: Noncontrast enhanced images of the abdomen and pelvis  obtained without oral contrast.     FINDINGS:  Left lingular and left lower lobe subsegmental atelectasis is present.  The heart is mildly enlarged. Extensive coronary artery calcifications  are present. A transvenous pacer is in place..     LIVER: No focal liver lesion.     BILIARY SYSTEM: Gallstones are  demonstrated within the gallbladder neck.  Gallbladder is mildly distended. There is no biliary dilatation  present..     PANCREAS: No focal pancreatic lesion.     SPLEEN: Splenic granulomas are present. No evidence of splenomegaly..     KIDNEYS AND ADRENALS: The adrenals are unremarkable. Calcifications  within the renal mahesh bilaterally are felt to be vascular in nature. No  perinephric fluid collections are present. No discrete renal mass. There  is asymmetric dilatation of the upper tracts of the left kidney. The  left ureter is asymmetrically distended to the level of the true pelvis.  There is a distal 6 x 5 mm intraureteral stone on image 117 of series 2  within the ureter approximately 4 cm above the left UVJ. The left ureter  below the level of the stone is decompressed.. The right ureter is  decompressed and normal in appearance..     RETROPERITONEUM: No mass, lymphadenopathy or hemorrhage.     GI TRACT: Constipation is present with a large volume of stool  throughout the colon. The small bowel is normal in distribution and  appearance. There is no gastric wall thickening or gastric outlet  obstruction.. The appendix is visualized and unremarkable.     OTHER: There is no mesenteric mass, lymphadenopathy or fluid collection.  The osseous structures and soft tissues demonstrate no worrisome  lesions. There is previous compression deformity at T12 with previous  kyphoplasty. The patient has undergone fusion at the L3-L4 and L4-L5  level with pedicle screws and interbody grafts. There is osteopenia. A  small fat-containing periumbilical hernia is present.     PELVIS: There is a calcified fibroid within the lower uterine segment.  No adnexal mass or free fluid.. The urinary bladder is normal in  appearance.       Impression:      1. Obstructive uropathy on the left with mild to moderate dilatation of  the upper tracts of the left kidney and left ureter into the true pelvis  where there is a 5 x 6 mm calculus  within the left ureter approximately  4 to 5 cm above the UVJ. The right ureter is decompressed and normal in  appearance. No evidence of renal mass. No perinephric fluid collection  present.  2. The gallbladder is mildly distended. There are gallstones within the  gallbladder neck. No pericholecystic inflammatory changes or biliary  dilatation.  3. Constipation. No obstruction or free air. Normal appendix.  3. Calcified fibroid within the lower uterine segment.  4. Mild constipation. No obstruction or free air. Normal appendix.  5. Small fat-containing periumbilical hernia.  6. Previous kyphoplasty at T12. Previous fusion at L3-L4 and L4-L5.  7. Left basilar atelectasis..           This report was signed and finalized on 6/20/2024 1:46 PM by Dr. Neymar Calderon MD.               I have reviewed the patient's current medications.     Assessment/Plan   Assessment  Active Hospital Problems    Diagnosis     **Altered mental status     Esophageal dysphagia     UTI (urinary tract infection)     Hydronephrosis with urinary obstruction due to ureteral calculus     Hypothyroidism     Stage 3b chronic kidney disease     Essential hypertension     Sick sinus syndrome     Venous insufficiency     Rheumatoid arthritis involving multiple sites     Chronic heart failure with preserved ejection fraction     Chronic anticoagulation     Chronic embolism and thrombosis of unspecified deep veins of left lower extremity        Treatment Plan  Altered mental status.  Resolved.  CT scan the head-No acute intracranial abnormality, Chronic sinusitis and left mastoid effusion.  Chest x-ray-Stable chest exam without acute process, No visualized infiltrate.   Patient is on 2 L of oxygen.     Recurrent UTI/questionable neurogenic bladder.  Cefepime antibiotics.  Continue Hip-Setf.  Repeat UA, negative for bacteria.  Consult ID.  Urology consult.  CT scan abdomen pelvic . Obstructive uropathy on the left with mild to moderate dilatation of the  upper tracts of the left kidney and left ureter into the true pelvis where there is a 5 x 6 mm calculus within the left ureter approximately 4 to 5 cm above the UVJ- right ureter is decompressed and normal in appearance- No evidence of renal mass. No perinephric fluid collection present, gallbladder is mildly distended- gallstones within the gallbladder neck,  No pericholecystic inflammatory changes or biliary dilatation, Constipation,  No obstruction or free air, Normal appendix, Calcified fibroid within the lower uterine segment, Mild constipation, No obstruction or free air, Normal appendix, Small fat-containing periumbilical hernia, Previous kyphoplasty at T12, Previous fusion at L3-L4 and L4-L5, Left basilar atelectasis..    Obstructing renal calculi left side/moderate dilated of left ureter.  Callus 5 x 6 mm and left 4 to 5 cm above the UVJ junction.  Status post 6/21/2024 urethroscope laser lithotripsy with stent insertion left    Gallstone, within the gallbladder neck.  Mild distended gallbladder.  Right upper quadrant tenderness.  Ultrasound of the gallbladder.  Consult general surgery.    Reflux/esophageal dysphagia. Dysphagia recent esophageal stretching, GI evaluated and recommended to continue outpatient follow-up.  SLP evaluated and recommended to continue regular diet.  Protonix . Zofran as needed.     Hypothyroidism.  Synthroid.     Chronic stage IIIb renal failure.  Creatinine stable.     Hypomagnesia. Resolved.     Hyponatremia.  Resolved.     Hypokalemia.  Resolved.  Magnesium normal.     Hypertension/sick sinus syndrome/venous insufficiency/CHF.  Eliquis.  Aspirin . Coreg . Lasix.  Imdur.  Cozaar . Aldactone.  Nitro as needed.     Right gluteal wound.  Consult wound care.     Rheumatoid arthritis. Arava.     Depression/anxiety.  Cymbalta.     Postmenopausal.  Estradiol cream.     Anemia.  Hemoglobin stable.  Iron sulfate.  Folic acid.  No sign of acute bleed.     Pain . lidocaine patch.  Voltaren  gel.  Ultram as needed.     Chronic DVT.     Nutrition.  Cardiac diet.  Vitamin C.  Multivitamins.  Regular/house diet.  Boost supplement.     Deconditioning.  PT and OT consult.  Patient use a walker and bedbound at baseline.     Urine culture-100,000 CFU/mL Enterobacter cloacae complex  25,000 CFU/mL Enterobacter cloacae complex CRE   Blood culture-no growth for 5 days.     Dr. Shin's mom.  Patient already have home health at home.  Patient request for lift at discharge.     Medical Decision Making  Number and Complexity of problems: Recurrent UTI/altered mental status/reflux/rheumatoid arthritis/chronic 3B renal failure  Differential Diagnosis: None     Conditions and Status        Condition is unchanged.     MDM Data  External documents reviewed: Previous note .  Cardiac tracing (EKG, telemetry) interpretation: Sinus.  Radiology interpretation: X-ray/CT scan of the head  Labs reviewed: Laboratory  Any tests that were considered but not ordered: Lab in a.m.     Decision rules/scores evaluated (example [id number], Wells, etc): None     Discussed with: Patient and family     Care Planning  Shared decision making: Patient and family  Code status and discussions: DNR     Disposition  Social Determinants of Health that impact treatment or disposition: From home  1 to 3 days.       Electronically signed by Saulo Ambriz MD, 06/21/24, 10:32 CDT.

## 2024-06-21 NOTE — ANESTHESIA POSTPROCEDURE EVALUATION
"Patient: Tawny Shin    Procedure Summary       Date: 06/21/24 Room / Location:  [facility] OR 01 /  [facility] OR    Anesthesia Start: 1058 Anesthesia Stop: 1145    Procedure: URETEROSCOPY LASER LITHOTRIPSY WITH STENT INSERTION LEFT (Ureter) Diagnosis:       Hydronephrosis with urinary obstruction due to ureteral calculus      (Hydronephrosis with urinary obstruction due to ureteral calculus [N13.2])    Surgeons: Ky Plascencia MD Provider: Yadira Fields CRNA    Anesthesia Type: general ASA Status: 4            Anesthesia Type: general    Vitals  Vitals Value Taken Time   /69 06/21/24 1330   Temp 98.3 °F (36.8 °C) 06/21/24 1330   Pulse 70 06/21/24 1331   Resp 18 06/21/24 1330   SpO2 97 % 06/21/24 1331   Vitals shown include unfiled device data.        Post Anesthesia Care and Evaluation    Patient location during evaluation: PACU  Patient participation: complete - patient participated  Level of consciousness: awake  Pain management: adequate    Airway patency: patent  Anesthetic complications: No anesthetic complications  PONV Status: none  Cardiovascular status: acceptable  Respiratory status: acceptable  Hydration status: acceptable    Comments: /69   Pulse 70   Temp 98.3 °F (36.8 °C) (Temporal)   Resp 18   Ht 167.6 cm (66\")   Wt 94.8 kg (209 lb)   LMP  (LMP Unknown)   SpO2 98%   BMI 33.73 kg/m²   Patient discharged from PACU based on Abdifatah score. See RN notes for further details.    "

## 2024-06-21 NOTE — OP NOTE
Operative Summary    Tawny Shin  Date of Procedure: 6/21/2024    Pre-op Diagnosis:   Hydronephrosis with urinary obstruction due to ureteral calculus [N13.2]    Post-op Diagnosis:     Post-Op Diagnosis Codes:     * Hydronephrosis with urinary obstruction due to ureteral calculus [N13.2]    Procedure/CPT® Codes:      Procedure(s):  URETEROSCOPY LASER LITHOTRIPSY WITH STENT INSERTION LEFT    Surgeon(s):  Ky Plascencia MD    Anesthesia: General    Staff:   Circulator: Mari Parkinson RN  Scrub Person: Ruthann John; Lauren Cheung    Indications for procedure:  Left ureteral obstruction secondary to calculus    Findings:   Patient has a soft core in this stone with a yellow crystalline outer shell.  It fragmented very easily with the laser but was difficult to maintain a stone in the basket because the wire was simply pulled through the stone.    Procedure details:     After appropriate anesthesia, positioning, prep and drape, timeout protocol was observed.      A 22 Haitian cystoscope sheath with a 30° lens is inserted.   30 and 70 degree lens inspection of bladder is carried out.  Cystoscopy findings are described above.    A 0.038 mm Sensor guidewire is then passed under direct fluoroscopic vision and manipulated by the stone to reach the renal pelvis.  I passed a 5 Haitian open-ended ureteral catheter over the guidewire into the renal pelvis.  A hydronephrotic drip was noted.  A urine was sent for culture.  The wire was placed back through the catheter which is then removed.    The Dominguez Ultrathin semi-rigid ureteroscope is then passed through the ureteral orifice as the wire was identified.  Care was taken manipulating the ureteroscope up to the level of the stone.  Once accomplished a 200 µm holmium laser fiber is inserted with initial settings of 0.8 J at a repetition rate of 8 for fragmentation and a dusting setting of 0.3 J at a repetition rate of 50.  Stone is fragmented into reasonably small fragments  that were able to be removed or pulled into the bladder.  This was a very soft stone which fragmented into pieces that were very difficult to basket.  The ureter was clean.      The wire is then back loaded through the cystoscope and the orifice visualized with the wire appropriately positioned. I passed a ureteral stent over the guidewire into the right renal pelvis and pulled the wire back seeing a good curl in the renal pelvis on fluoroscopy. the wire is removed in its entirety and I could see a good curl of the stent in the bladder.  The string, which was left in place, is tucked in the vaginal cavity for ease of removal.  The bladder is then drained.  A Horan catheter was then placed and she started on continuous bladder irrigation which will be continued overnight.  The patient tolerated the procedure without difficulty.      Patient is now transferred to recovery room in stable condition.      Estimated Blood Loss: Less than 30 mL    Specimens:                Specimens       ID Source Type Tests Collected By Collected At Frozen?    1 Urine, Catheter Urine URINE CULTURE   Ky Plascencia MD 6/21/24 1116     Description: LEFT RENAL PELVIS URINE CULTURE              Drains:   Ureteral Drain/Stent Left ureter 7 Fr. (Active)   Site Assessment ABIEL 06/21/24 1310       Continuous Bladder Irrigation Triple-lumen 20 Fr (Active)   Daily Indications Continuous Bladder Irrigation 06/21/24 1310   Site Assessment Red 06/21/24 1210   Collection Container Standard drainage bag 06/21/24 1144   Securement Method Securing device 06/21/24 1310   Patient Tolerance of Continuous Bladder Irrigation Tolerating well 06/21/24 1240   Rate Moderate 06/21/24 1310   Irrigant Normal saline 06/21/24 1310       [REMOVED] Closed/Suction Drain Right;Anterior Thigh Bulb 15 Fr. (Removed)       [REMOVED] Urethral Catheter Silicone 16 Fr. (Removed)       [REMOVED] Urethral Catheter (Removed)       [REMOVED] Urethral Catheter (Removed)        [REMOVED] Urethral Catheter 18 Fr. (Removed)       [REMOVED] Urethral Catheter (Removed)       [REMOVED] Urethral Catheter Latex 16 Fr. (Removed)       [REMOVED] External Urinary Catheter (Removed)       [REMOVED] External Urinary Catheter (Removed)       [REMOVED] External Urinary Catheter (Removed)       [REMOVED] External Urinary Catheter (Removed)       [REMOVED] External Urinary Catheter (Removed)       [REMOVED] External Urinary Catheter (Removed)       [REMOVED] External Urinary Catheter (Removed)   Daily Indications Daily output 05/31/24 0800   Site Assessment Clean;Skin intact 05/31/24 0800   Application/Removal external catheter changed 05/30/24 1518   Collection Container Wall suction 05/31/24 0800   Wall suction (mmHG) 120 mmHG 05/31/24 0800   Securement Method Securing device 05/31/24 0800   Catheter care complete Yes 05/28/24 2013   Output (mL) 300 mL 05/31/24 0600       [REMOVED] External Urinary Catheter (Removed)   Daily Indications Pressure ulcers/Compromised skin 06/19/24 0850   Site Assessment Clean;Skin intact 06/19/24 0850   Application/Removal skin care provided 06/19/24 0850   Collection Container Wall suction 06/19/24 0850   Wall suction (mmHG) 120 mmHG 06/19/24 0850   Securement Method Securing device 06/19/24 0850   Catheter care complete Yes 06/19/24 0850   Output (mL) 600 mL 06/19/24 0701       Complications: none    Plan: Patient will need stent until middle part of next week.  Has a string at external urethral meatus.    Ky Plascencia MD     Date: 6/21/2024  Time: 13:21 CDT

## 2024-06-21 NOTE — PROGRESS NOTES
INFECTIOUS DISEASES PROGRESS NOTE    Patient:  Tawny Shin  YOB: 1955  MRN: [id number]   Admit date: 6/12/2024   Admitting Physician: Saulo Ambriz MD  Primary Care Physician: Moises Oliveira MD    Chief Complaint: None offered      Interval History: Patient just got back from surgery with Dr. Plascencia due to obstructing stone with hydronephrosis.        Allergies:   Allergies   Allergen Reactions    Atorvastatin Other (See Comments)     LEG CRAMPS      Amoxicillin Rash    Escitalopram Rash    Nabumetone Rash    Niacin Er Rash    Penicillin G Rash    Penicillins Rash    Simvastatin Rash       Current Scheduled Medications:   apixaban, 5 mg, Oral, BID  ascorbic acid, 500 mg, Oral, Daily  aspirin, 81 mg, Oral, Daily  carvedilol, 25 mg, Oral, BID With Meals  cefepime, 2,000 mg, Intravenous, Q12H  DULoxetine, 60 mg, Oral, Daily  estradiol, 2 g, Vaginal, Daily  ferrous sulfate, 325 mg, Oral, Daily With Breakfast  folic acid, 1,000 mcg, Oral, Daily  furosemide, 40 mg, Oral, Daily  isosorbide mononitrate, 60 mg, Oral, Daily  leflunomide, 20 mg, Oral, Daily  levothyroxine, 75 mcg, Oral, Q AM  losartan, 50 mg, Oral, Daily  methenamine, 1 g, Oral, BID  multivitamin with minerals, 1 tablet, Oral, Daily  pantoprazole, 40 mg, Oral, Daily  sodium chloride, 10 mL, Intravenous, Q12H  spironolactone, 25 mg, Oral, Daily      Current PRN Medications:    acetaminophen    senna-docusate sodium **AND** polyethylene glycol **AND** bisacodyl **AND** bisacodyl    Calcium Replacement - Follow Nurse / BPA Driven Protocol    Diclofenac Sodium    Lidocaine    Magnesium Low Dose Replacement - Follow Nurse / BPA Driven Protocol    [DISCONTINUED] Morphine **AND** naloxone    nitroglycerin    ondansetron    Phosphorus Replacement - Follow Nurse / BPA Driven Protocol    Potassium Replacement - Follow Nurse / BPA Driven Protocol    sodium chloride    sodium chloride    sodium chloride    traMADol              Objective  "    Vital Signs:  Temp (24hrs), Av °F (36.7 °C), Min:97.5 °F (36.4 °C), Max:98.3 °F (36.8 °C)      /69   Pulse 70   Temp 98.3 °F (36.8 °C) (Temporal)   Resp 18   Ht 167.6 cm (66\")   Wt 94.8 kg (209 lb)   LMP  (LMP Unknown)   SpO2 98%   BMI 33.73 kg/m²         Physical Exam:    General: Patient is lying in bed in no acute distress.  Caregivers at bedside  Respiratory: Effort even unlabored  Abdomen: Soft, nontender, nondistended, bowel sounds positive  Urinary catheter in place with clear yellow urine in bag    Right gluteal wound      Results Review:    I reviewed the patient's new clinical results.    Lab Results:    CBC:   Lab Results   Lab 24  0418 24  0122 24  0354 24  0428   WBC 6.75 6.69 6.32 6.22   HEMOGLOBIN 9.7* 7.4* 7.5* 7.8*   HEMATOCRIT 32.4* 25.3* 25.2* 25.4*   PLATELETS 174 168 192 200        AutoDiff:            Manual Diff:          Invalid input(s): \"MONABS\"          CMP:   Lab Results   Lab 24  0018 24  0354 24  0428   SODIUM 135* 133* 137   POTASSIUM 3.7 3.4* 3.8   CHLORIDE 101 97* 101   CO2 26.0 28.0 29.0   BUN 23 22 18   CREATININE 1.16* 1.07* 1.01*   CALCIUM 8.3* 8.6 9.0   GLUCOSE 120* 97 98       Estimated Creatinine Clearance: 60.1 mL/min (A) (by C-G formula based on SCr of 1.01 mg/dL (H)).    Culture Results:    Microbiology Results (last 10 days)       Procedure Component Value - Date/Time    Urine Culture - Urine, Urine, Clean Catch [[id number]]  (Abnormal)  (Susceptibility) Collected: 24 1219    Lab Status: Final result Specimen: Urine, Clean Catch Updated: 24 0936     Urine Culture 25,000 CFU/mL Enterobacter cloacae complex CRE     Comment:   Consider infectious disease consult.  Susceptibility results may not correlate to clinical outcomes.  Carbapenem resistant Enterobacteriaceae, patient may be an isolation risk.  No carbapenemase detected.       Narrative:      Colonization of the urinary tract without infection " is common. Treatment is discouraged unless the patient is symptomatic, pregnant, or undergoing an invasive urologic procedure.    Susceptibility        Enterobacter cloacae complex CRE      LICHA      Cefepime Susceptible      Ceftazidime Resistant      Ceftriaxone Resistant      Gentamicin Susceptible      Imipenem Intermediate      Levofloxacin Resistant      Meropenem Resistant      Nitrofurantoin Resistant      Piperacillin + Tazobactam Resistant      Trimethoprim + Sulfamethoxazole Resistant                           COVID PRE-OP / PRE-PROCEDURE SCREENING ORDER (NO ISOLATION) - Swab, Nasopharynx [[id number]]  (Normal) Collected: 06/12/24 2125    Lab Status: Final result Specimen: Swab from Nasopharynx Updated: 06/12/24 2156    Narrative:      The following orders were created for panel order COVID PRE-OP / PRE-PROCEDURE SCREENING ORDER (NO ISOLATION) - Swab, Nasopharynx.  Procedure                               Abnormality         Status                     ---------                               -----------         ------                     COVID-19 and FLU A/B PCR...[[id number]]  Normal              Final result                 Please view results for these tests on the individual orders.    COVID-19 and FLU A/B PCR, 1 HR TAT - Swab, Nasopharynx [[id number]]  (Normal) Collected: 06/12/24 2125    Lab Status: Final result Specimen: Swab from Nasopharynx Updated: 06/12/24 2156     COVID19 Not Detected     Influenza A PCR Not Detected     Influenza B PCR Not Detected    Narrative:      Fact sheet for providers: https://www.fda.gov/media/245458/download    Fact sheet for patients: https://www.fda.gov/media/975940/download    Test performed by PCR.    Blood Culture - Blood, Arm, Left [[id number]]  (Normal) Collected: 06/12/24 2120    Lab Status: Final result Specimen: Blood from Arm, Left Updated: 06/17/24 2145     Blood Culture No growth at 5 days    Urine Culture - Urine, Straight Cath [[id number]]  (Abnormal)   (Susceptibility) Collected: 06/12/24 2117    Lab Status: Final result Specimen: Urine from Straight Cath Updated: 06/17/24 1203     Urine Culture >100,000 CFU/mL Enterobacter cloacae complex     Comment:   This organism may develop resistance during prolonged therapy with 3rd generation cephalosporins (e.g. ceftriaxone) as a result of de-repression of AmpC B-lactamase.  Ceftriaxone may be a reasonable treatment option for uncomplicated cystitis or other lower severity infections when susceptibility is demonstrated.         50,000 CFU/mL Mixed Selina Isolated    Narrative:      Specimen contains mixed organisms of questionable pathogenicity suggestive of contamination. If symptoms persist, suggest recollection.  Colonization of the urinary tract without infection is common. Treatment is discouraged unless the patient is symptomatic, pregnant, or undergoing an invasive urologic procedure.    Susceptibility        Enterobacter cloacae complex      LICHA      Cefepime Susceptible      Ceftazidime Resistant      Ceftriaxone Resistant      Gentamicin Susceptible      Imipenem Intermediate      Levofloxacin Resistant      Nitrofurantoin Resistant      Piperacillin + Tazobactam Resistant      Trimethoprim + Sulfamethoxazole Resistant                           Blood Culture - Blood, Arm, Right [[id number]]  (Normal) Collected: 06/12/24 2112    Lab Status: Final result Specimen: Blood from Arm, Right Updated: 06/17/24 2145     Blood Culture No growth at 5 days                 Radiology:     Reviewed fluoroscopy films below with guidewire present    Imaging Results (Last 72 Hours)       Procedure Component Value Units Date/Time    XR Abdomen 1 View [[id number]] Collected: 06/21/24 1138     Updated: 06/21/24 1142    Narrative:      XR ABDOMEN 1 VW- 6/21/2024 10:00 AM     HISTORY: stent; N39.0-Urinary tract infection, site not specified;  R41.82-Altered mental status, unspecified; S31.819S-Unspecified open  wound of right buttock,  sequela; L72.9-Follicular cyst of the skin and  subcutaneous tissue, unspecified; R13.10-Dysphagia, unspecified;  Z74.09-Other reduced mobility; N13.2-Hydronephrosis with renal and  ureteral calculous obstruction     COMPARISON: None     FLUOROSCOPY TIME: 0.3 minutes     FLUOROSCOPY DOSE: 2.8 mGy     NUMBER OF IMAGES: 3       Impression:         Intraoperative fluoroscopic images during cystoscopy with left  uteroscopy and stent placement.     Please refer to the operative note for more details.        This report was signed and finalized on 6/21/2024 11:39 AM by Dr. Wesley Mcfadden MD.       [place] Surgery Fluoro [[id number]] Resulted: 06/21/24 1140     Updated: 06/21/24 1140    Narrative:      This procedure was auto-finalized with no dictation required.    CT Abdomen Pelvis Without Contrast [[id number]] Collected: 06/20/24 1333     Updated: 06/20/24 1350    Narrative:      CT ABDOMEN PELVIS WO CONTRAST- 6/20/2024 11:43 AM     HISTORY: Rule out kidney stone.; N39.0-Urinary tract infection, site not  specified; R41.82-Altered mental status, unspecified;  S31.819S-Unspecified open wound of right buttock, sequela;  L72.9-Follicular cyst of the skin and subcutaneous tissue, unspecified;  R13.10-Dysphagia, unspecified; Z74.09-Other reduced mobility      COMPARISON: Previous CT pelvis with contrast dated 4/21/2024     DLP: 633.64 mGy.cm . All CT scans are performed using dose optimization  techniques as appropriate to the performed exam and including at least  one of the following: Automated exposure control, adjustment of the mA  and/or kV according to size, and the use of the iterative reconstruction  technique.     TECHNIQUE: Noncontrast enhanced images of the abdomen and pelvis  obtained without oral contrast.     FINDINGS:  Left lingular and left lower lobe subsegmental atelectasis is present.  The heart is mildly enlarged. Extensive coronary artery calcifications  are present. A transvenous pacer is in place..      LIVER: No focal liver lesion.     BILIARY SYSTEM: Gallstones are demonstrated within the gallbladder neck.  Gallbladder is mildly distended. There is no biliary dilatation  present..     PANCREAS: No focal pancreatic lesion.     SPLEEN: Splenic granulomas are present. No evidence of splenomegaly..     KIDNEYS AND ADRENALS: The adrenals are unremarkable. Calcifications  within the renal mahesh bilaterally are felt to be vascular in nature. No  perinephric fluid collections are present. No discrete renal mass. There  is asymmetric dilatation of the upper tracts of the left kidney. The  left ureter is asymmetrically distended to the level of the true pelvis.  There is a distal 6 x 5 mm intraureteral stone on image 117 of series 2  within the ureter approximately 4 cm above the left UVJ. The left ureter  below the level of the stone is decompressed.. The right ureter is  decompressed and normal in appearance..     RETROPERITONEUM: No mass, lymphadenopathy or hemorrhage.     GI TRACT: Constipation is present with a large volume of stool  throughout the colon. The small bowel is normal in distribution and  appearance. There is no gastric wall thickening or gastric outlet  obstruction.. The appendix is visualized and unremarkable.     OTHER: There is no mesenteric mass, lymphadenopathy or fluid collection.  The osseous structures and soft tissues demonstrate no worrisome  lesions. There is previous compression deformity at T12 with previous  kyphoplasty. The patient has undergone fusion at the L3-L4 and L4-L5  level with pedicle screws and interbody grafts. There is osteopenia. A  small fat-containing periumbilical hernia is present.     PELVIS: There is a calcified fibroid within the lower uterine segment.  No adnexal mass or free fluid.. The urinary bladder is normal in  appearance.       Impression:      1. Obstructive uropathy on the left with mild to moderate dilatation of  the upper tracts of the left kidney and left  ureter into the true pelvis  where there is a 5 x 6 mm calculus within the left ureter approximately  4 to 5 cm above the UVJ. The right ureter is decompressed and normal in  appearance. No evidence of renal mass. No perinephric fluid collection  present.  2. The gallbladder is mildly distended. There are gallstones within the  gallbladder neck. No pericholecystic inflammatory changes or biliary  dilatation.  3. Constipation. No obstruction or free air. Normal appendix.  3. Calcified fibroid within the lower uterine segment.  4. Mild constipation. No obstruction or free air. Normal appendix.  5. Small fat-containing periumbilical hernia.  6. Previous kyphoplasty at T12. Previous fusion at L3-L4 and L4-L5.  7. Left basilar atelectasis..           This report was signed and finalized on 6/20/2024 1:46 PM by Dr. Neymar Calderon MD.                   Active Hospital Problems    Diagnosis     **Altered mental status     Esophageal dysphagia     UTI (urinary tract infection)     Hydronephrosis with urinary obstruction due to ureteral calculus     Hypothyroidism     Stage 3b chronic kidney disease     Essential hypertension     Sick sinus syndrome     Venous insufficiency     Rheumatoid arthritis involving multiple sites     Chronic heart failure with preserved ejection fraction     Chronic anticoagulation     Chronic embolism and thrombosis of unspecified deep veins of left lower extremity        IMPRESSION:  Obstructing left ureteral stone status post cystoscopy and left ureteral stent placement per Dr. 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  Susceptible to cefepime.  Recurrent very frequent admissions with probable urinary tract infections, some associated with bacteremia and different organisms have been isolated.  Had been primarily concerned about patient's difficulty voiding when sitting on the toilet and possible incomplete emptying as she also has incontinence and wears a  brief full-time.  Hopefully, discovery of obstructing stone and treatment per Dr. Plascencia will decrease recurrent infections  Right gluteal wound      RECOMMENDATION:   Continue cefepime  Follow-up on operative findings  Follow-up on any operative cultures  Continue local wound care to right gluteal wound continue    Dr Ohara will see Sat or Sunday. Call sooner if needed      Maggie Bang MD  06/21/24  13:59 CDT

## 2024-06-21 NOTE — ANESTHESIA PROCEDURE NOTES
Airway  Urgency: elective    Date/Time: 6/21/2024 11:04 AM  Airway not difficult    General Information and Staff    Patient location during procedure: OR  CRNA/CAA: Yadira Fields CRNA    Indications and Patient Condition  Indications for airway management: airway protection    Preoxygenated: yes  Mask difficulty assessment: 1 - vent by mask    Final Airway Details  Final airway type: endotracheal airway      Successful airway: ETT  Cuffed: yes   Successful intubation technique: direct laryngoscopy  Facilitating devices/methods: intubating stylet  Endotracheal tube insertion site: oral  Blade: Irene  Blade size: 3  ETT size (mm): 7.5  Cormack-Lehane Classification: grade I - full view of glottis  Placement verified by: chest auscultation and capnometry   Cuff volume (mL): 7  Measured from: teeth  ETT/EBT  to teeth (cm): 21  Number of attempts at approach: 1  Assessment: lips, teeth, and gum same as pre-op and atraumatic intubation    Additional Comments  Easy mask, easy airway, grade 1 with Irene

## 2024-06-21 NOTE — HOME HEALTH
Patient transferred to [facility] on 6/12/24 with dx of Altered mental status  Transfer untimely due to patient remained OBS from 6/12/24 until changed to INP on 6/21/24

## 2024-06-21 NOTE — PLAN OF CARE
Goal Outcome Evaluation:  Plan of Care Reviewed With: patient, caregiver        Progress: no change  Outcome Evaluation: Patient a/O x 4. VSS. On room air. Incontinent with brief in place; Purewick had been discontinued. Dressing to gluteal wond C/D/I. Tolerating IV ABT. Tramadol given once last night with good results. NPO since midnight. Caregiver at bedside throughout the night. Safety maintained.

## 2024-06-21 NOTE — ANESTHESIA PREPROCEDURE EVALUATION
Anesthesia Evaluation     Patient summary reviewed   history of anesthetic complications:  difficult airway  NPO Solid Status: > 8 hours  NPO Liquid Status: > 8 hours           Airway   Mallampati: II  TM distance: >3 FB  Neck ROM: full  No difficulty expected  Dental - normal exam     Pulmonary    (+) COPD, asthma,  Cardiovascular   Exercise tolerance: poor (<4 METS)    (+) pacemaker ([employer]tronic) pacemaker, hypertension well controlled 2 medications or greater, past MI  >12 months, CAD, cardiac stents (2 stents, most recent 6 years ago) more than 12 months ago , DVT resolved, hyperlipidemia    ROS comment: Interrogation report reviewed 4/2024  [employer]  96% sensing    Neuro/Psych  (+) headaches, syncope, numbness, dementia  (-) seizures, TIA, CVA  GI/Hepatic/Renal/Endo    (+) renal disease- CRI, thyroid problem hypothyroidism  (-) liver disease, diabetes    ROS Comment: Admitted with weakness, found to have UTI  Also with buttock nodule    Musculoskeletal     (+) gait problem  Abdominal    Substance History - negative use     OB/GYN          Other   arthritis (rheumatoid arthritis), blood dyscrasia anemia,   history of cancer      Other Comment: Sjogren's       Phys Exam Other: Previously gr IIb view with MIL 2              Anesthesia Plan    ASA 4     general     (Have magnet in room, interrogate in recovery)  intravenous induction     Anesthetic plan, risks, benefits, and alternatives have been provided, discussed and informed consent has been obtained with: patient.

## 2024-06-21 NOTE — PLAN OF CARE
Goal Outcome Evaluation:   VSS. A&Ox4. Horan catheter. CBI. Slow drip. Post L ureter stent and lithotripsy done today per Dr. Plascencia. Urine clear. Free from clots. No complaints at this time. Will continue to monitor.

## 2024-06-21 NOTE — CASE COMMUNICATION
Patient missed a physical therapy visit from [facility] on 6/21/2024.     Reason: Patient was admitted to the hospital. .       For your records only.   Per CMS Guidance, MD must be notified of missed/cancelled visits; therefore the prescribed frequency was not met.

## 2024-06-22 ENCOUNTER — APPOINTMENT (OUTPATIENT)
Dept: NUCLEAR MEDICINE | Facility: HOSPITAL | Age: 69
DRG: 659 | End: 2024-06-22
Payer: MEDICARE

## 2024-06-22 ENCOUNTER — APPOINTMENT (OUTPATIENT)
Dept: ULTRASOUND IMAGING | Facility: HOSPITAL | Age: 69
DRG: 659 | End: 2024-06-22
Payer: MEDICARE

## 2024-06-22 LAB
ALBUMIN SERPL-MCNC: 2.6 G/DL (ref 3.5–5.2)
ALBUMIN/GLOB SERPL: 0.7 G/DL
ALP SERPL-CCNC: 81 U/L (ref 39–117)
ALT SERPL W P-5'-P-CCNC: 18 U/L (ref 1–33)
ANION GAP SERPL CALCULATED.3IONS-SCNC: 7 MMOL/L (ref 5–15)
AST SERPL-CCNC: 19 U/L (ref 1–32)
BACTERIA SPEC AEROBE CULT: NO GROWTH
BILIRUB SERPL-MCNC: 0.4 MG/DL (ref 0–1.2)
BUN SERPL-MCNC: 16 MG/DL (ref 8–23)
BUN/CREAT SERPL: 17.2 (ref 7–25)
CALCIUM SPEC-SCNC: 9.1 MG/DL (ref 8.6–10.5)
CHLORIDE SERPL-SCNC: 103 MMOL/L (ref 98–107)
CO2 SERPL-SCNC: 28 MMOL/L (ref 22–29)
CREAT SERPL-MCNC: 0.93 MG/DL (ref 0.57–1)
DEPRECATED RDW RBC AUTO: 62.5 FL (ref 37–54)
EGFRCR SERPLBLD CKD-EPI 2021: 66.3 ML/MIN/1.73
ERYTHROCYTE [DISTWIDTH] IN BLOOD BY AUTOMATED COUNT: 18.2 % (ref 12.3–15.4)
GLOBULIN UR ELPH-MCNC: 3.5 GM/DL
GLUCOSE SERPL-MCNC: 99 MG/DL (ref 65–99)
HCT VFR BLD AUTO: 27.2 % (ref 34–46.6)
HGB BLD-MCNC: 8.1 G/DL (ref 12–15.9)
MCH RBC QN AUTO: 28.3 PG (ref 26.6–33)
MCHC RBC AUTO-ENTMCNC: 29.8 G/DL (ref 31.5–35.7)
MCV RBC AUTO: 95.1 FL (ref 79–97)
PLATELET # BLD AUTO: 185 10*3/MM3 (ref 140–450)
PMV BLD AUTO: 10 FL (ref 6–12)
POTASSIUM SERPL-SCNC: 4.2 MMOL/L (ref 3.5–5.2)
PROT SERPL-MCNC: 6.1 G/DL (ref 6–8.5)
RBC # BLD AUTO: 2.86 10*6/MM3 (ref 3.77–5.28)
SODIUM SERPL-SCNC: 138 MMOL/L (ref 136–145)
WBC NRBC COR # BLD AUTO: 6.21 10*3/MM3 (ref 3.4–10.8)

## 2024-06-22 PROCEDURE — 25010000002 CEFEPIME PER 500 MG: Performed by: INTERNAL MEDICINE

## 2024-06-22 PROCEDURE — 99232 SBSQ HOSP IP/OBS MODERATE 35: CPT | Performed by: UROLOGY

## 2024-06-22 PROCEDURE — 99232 SBSQ HOSP IP/OBS MODERATE 35: CPT | Performed by: INTERNAL MEDICINE

## 2024-06-22 PROCEDURE — 76705 ECHO EXAM OF ABDOMEN: CPT

## 2024-06-22 PROCEDURE — 25010000002 ONDANSETRON PER 1 MG: Performed by: UROLOGY

## 2024-06-22 PROCEDURE — 25010000002 ENOXAPARIN PER 10 MG: Performed by: FAMILY MEDICINE

## 2024-06-22 PROCEDURE — 85027 COMPLETE CBC AUTOMATED: CPT | Performed by: UROLOGY

## 2024-06-22 PROCEDURE — 78226 HEPATOBILIARY SYSTEM IMAGING: CPT

## 2024-06-22 PROCEDURE — 25010000002 METRONIDAZOLE 500 MG/100ML SOLUTION: Performed by: SURGERY

## 2024-06-22 PROCEDURE — 0 TECHNETIUM TC 99M MEBROFENIN KIT: Performed by: FAMILY MEDICINE

## 2024-06-22 PROCEDURE — A9537 TC99M MEBROFENIN: HCPCS | Performed by: FAMILY MEDICINE

## 2024-06-22 PROCEDURE — 99232 SBSQ HOSP IP/OBS MODERATE 35: CPT | Performed by: SURGERY

## 2024-06-22 PROCEDURE — 25010000002 LEVOFLOXACIN PER 250 MG: Performed by: SURGERY

## 2024-06-22 PROCEDURE — 80053 COMPREHEN METABOLIC PANEL: CPT | Performed by: FAMILY MEDICINE

## 2024-06-22 RX ORDER — KIT FOR THE PREPARATION OF TECHNETIUM TC 99M MEBROFENIN 45 MG/10ML
1 INJECTION, POWDER, LYOPHILIZED, FOR SOLUTION INTRAVENOUS
Status: COMPLETED | OUTPATIENT
Start: 2024-06-22 | End: 2024-06-22

## 2024-06-22 RX ORDER — METRONIDAZOLE 500 MG/100ML
500 INJECTION, SOLUTION INTRAVENOUS EVERY 8 HOURS
Status: COMPLETED | OUTPATIENT
Start: 2024-06-22 | End: 2024-06-25

## 2024-06-22 RX ORDER — ENOXAPARIN SODIUM 100 MG/ML
1 INJECTION SUBCUTANEOUS EVERY 12 HOURS
Status: DISCONTINUED | OUTPATIENT
Start: 2024-06-22 | End: 2024-06-23

## 2024-06-22 RX ORDER — LEVOFLOXACIN 5 MG/ML
750 INJECTION, SOLUTION INTRAVENOUS EVERY 24 HOURS
Qty: 750 ML | Refills: 0 | Status: DISCONTINUED | OUTPATIENT
Start: 2024-06-22 | End: 2024-06-23

## 2024-06-22 RX ADMIN — CEFEPIME 2000 MG: 2 INJECTION, POWDER, FOR SOLUTION INTRAVENOUS at 22:18

## 2024-06-22 RX ADMIN — METHENAMINE HIPPURATE 1 G: 1000 TABLET ORAL at 21:02

## 2024-06-22 RX ADMIN — METRONIDAZOLE 500 MG: 500 INJECTION, SOLUTION INTRAVENOUS at 19:44

## 2024-06-22 RX ADMIN — ENOXAPARIN SODIUM 90 MG: 100 INJECTION SUBCUTANEOUS at 21:01

## 2024-06-22 RX ADMIN — CEFEPIME 2000 MG: 2 INJECTION, POWDER, FOR SOLUTION INTRAVENOUS at 12:02

## 2024-06-22 RX ADMIN — ONDANSETRON 4 MG: 2 INJECTION INTRAMUSCULAR; INTRAVENOUS at 21:25

## 2024-06-22 RX ADMIN — LEVOFLOXACIN 750 MG: 750 INJECTION, SOLUTION INTRAVENOUS at 20:18

## 2024-06-22 RX ADMIN — MEBROFENIN 1 DOSE: 45 INJECTION, POWDER, LYOPHILIZED, FOR SOLUTION INTRAVENOUS at 14:42

## 2024-06-22 NOTE — PROGRESS NOTES
Infectious Diseases Progress Note    Patient:  Tawny Shin  YOB: 1955  MRN: [id number]   Admit date: 6/12/2024   Admitting Physician: Saulo Ambriz MD  Primary Care Physician: Moises Oliveira MD    Chief Complaint/Interval History: She is a little bit sleepy today.  She is without fever.  She had had some right upper quadrant discomfort.  She is undergoing HIDA scan.  She had undergone laser lithotripsy and placement of left ureteral stent yesterday.  She is without fever.  She is hemodynamically stable.  Family and caregiver at bedside.    Intake/Output Summary (Last 24 hours) at 6/22/2024 1555  Last data filed at 6/22/2024 1000  Gross per 24 hour   Intake --   Output 1150 ml   Net -1150 ml     Allergies:   Allergies   Allergen Reactions    Atorvastatin Other (See Comments)     LEG CRAMPS      Amoxicillin Rash    Escitalopram Rash    Nabumetone Rash    Niacin Er Rash    Penicillin G Rash    Penicillins Rash    Simvastatin Rash     Current Scheduled Medications:   apixaban, 5 mg, Oral, BID  ascorbic acid, 500 mg, Oral, Daily  aspirin, 81 mg, Oral, Daily  carvedilol, 25 mg, Oral, BID With Meals  cefepime, 2,000 mg, Intravenous, Q8H  DULoxetine, 60 mg, Oral, Daily  estradiol, 2 g, Vaginal, Daily  ferrous sulfate, 325 mg, Oral, Daily With Breakfast  folic acid, 1,000 mcg, Oral, Daily  furosemide, 40 mg, Oral, Daily  isosorbide mononitrate, 60 mg, Oral, Daily  leflunomide, 20 mg, Oral, Daily  levothyroxine, 75 mcg, Oral, Q AM  losartan, 50 mg, Oral, Daily  methenamine, 1 g, Oral, BID  multivitamin with minerals, 1 tablet, Oral, Daily  pantoprazole, 40 mg, Oral, Daily  sodium chloride, 10 mL, Intravenous, Q12H  spironolactone, 25 mg, Oral, Daily          Current PRN Medications:    acetaminophen    senna-docusate sodium **AND** polyethylene glycol **AND** bisacodyl **AND** bisacodyl    Calcium Replacement - Follow Nurse / BPA Driven Protocol    Diclofenac Sodium    Lidocaine    Magnesium Low  "Dose Replacement - Follow Nurse / BPA Driven Protocol    [DISCONTINUED] Morphine **AND** naloxone    nitroglycerin    ondansetron    Phosphorus Replacement - Follow Nurse / BPA Driven Protocol    Potassium Replacement - Follow Nurse / BPA Driven Protocol    sodium chloride    sodium chloride    sodium chloride    traMADol    Review of Systems see HPI.  No cardiopulmonary symptoms    Vital Signs:  Temp (24hrs), Av.3 °F (36.8 °C), Min:97.8 °F (36.6 °C), Max:98.8 °F (37.1 °C)    /65 (BP Location: Left arm, Patient Position: Lying)   Pulse 86   Temp 97.9 °F (36.6 °C) (Oral)   Resp 18   Ht 167.6 cm (66\")   Wt 94.8 kg (209 lb)   LMP  (LMP Unknown)   SpO2 95%   BMI 33.73 kg/m²     Physical Exam  Vital signs - reviewed.  Line/IV site - No erythema, warmth, induration, or tenderness.  She appears to have some very mild right upper quadrant area tenderness  No suprapubic or flank tenderness  Lungs clear without crackles    Lab Results:  CBC:   Results from last 7 days   Lab Units 24  0453 24  0428 24  0354 24  0122 24  0418   WBC 10*3/mm3 6.21 6.22 6.32 6.69 6.75   HEMOGLOBIN g/dL 8.1* 7.8* 7.5* 7.4* 9.7*   HEMATOCRIT % 27.2* 25.4* 25.2* 25.3* 32.4*   PLATELETS 10*3/mm3 185 200 192 168 174     BMP:  Results from last 7 days   Lab Units 24  0453 24  0428 24  0354 24  0018 24  0418   SODIUM mmol/L 138 137 133* 135* 136   POTASSIUM mmol/L 4.2 3.8 3.4* 3.7 4.1   CHLORIDE mmol/L 103 101 97* 101 100   CO2 mmol/L 28.0 29.0 28.0 26.0 27.0   BUN mg/dL 16 18 22 23 23   CREATININE mg/dL 0.93 1.01* 1.07* 1.16* 1.08*   GLUCOSE mg/dL 99 98 97 120* 122*   CALCIUM mg/dL 9.1 9.0 8.6 8.3* 8.4*   ALT (SGPT) U/L 18  --   --   --   --      Culture Results:   Urine Culture   Date Value Ref Range Status   2024 No growth  Final   2024 25,000 CFU/mL Enterobacter cloacae complex CRE (A)  Final     Comment:       Consider infectious disease consult.  " Susceptibility results may not correlate to clinical outcomes.  Carbapenem resistant Enterobacteriaceae, patient may be an isolation risk.  No carbapenemase detected.     Radiology: None  Additional Studies Reviewed: None    Impression:   1.  Obstructing left ureteral stone-underwent cystoscopy, laser lithotripsy, and ureteral stent placement  2.  She has had some right upper quadrant discomfort-gallbladder evaluation in process  3.  Carbapenem resistant Enterobacter isolated from urine.  Follow-up urine culture done yesterday no growth.  4.  Right gluteal wound  5.  Chronic illness/deconditioning  6.  Probable neurogenic bladder    Recommendations:   Continue cefepime  Await HIDA scan results  Continue supportive care  Continue to follow    Elie Ohara MD

## 2024-06-22 NOTE — PROGRESS NOTES
"Pharmacy Review Antimicrobial Stewardship     Current Antimicrobials:   cefepime 2000 mg IVPB in 100 mL NS (MBP)  levoFLOXacin (LEVAQUIN) 750 mg/150 mL D5W (premix) - 750 MG/150ML  methenamine - 1 g, 1 g  metroNIDAZOLE - 500-0.79 MG/100ML-%  Pharmacy to Dose LevoFLOXacin (LEVAQUIN)  valACYclovir - 500 MG    Indication(s): Intra-abdominal infection  Days of Therapy: 1 of 5    Is the therapy & duration appropriate based on evidence-based guidelines?: appropriate    Assessment/Action: Received a \"Pharmacy to Dose\" consult for the initiation of Levaquin for an indication of intra-abdominal infection. Age, CrCl, and other labs reviewed. Initiated patient on Levaquin 750 mg IVPB Q24H for the duration of 5 days. No other action required at this time per pharmacy based on available information and current clinical status. Will continue routine follow-up evaluation and make any necessary adjustments.    Yenni Millan, PharmD  06/22/24 17:17 CDT      Subjective:   Diagnosis:   1. Acute UTI (urinary tract infection)    2. Altered mental status, unspecified altered mental status type    3. Open wound of right buttock, sequela    4. Cyst of buttocks    5. Dysphagia, unspecified type    6. Impaired mobility [Z74.09]    7. Hydronephrosis with urinary obstruction due to ureteral calculus         Allergies:  Allergies as of 06/12/2024 - Reviewed 06/12/2024   Allergen Reaction Noted    Atorvastatin Other (See Comments) 11/01/2011    Amoxicillin Rash 11/03/2016    Escitalopram Rash 06/25/2020    Nabumetone Rash 11/01/2011    Niacin er Rash 11/01/2011    Penicillin g Rash 01/05/2022    Penicillins Rash 11/01/2011    Simvastatin Rash 11/01/2011       Objective:  Current Antimicrobial Medications:   Anti-Infectives (From admission, onward)      Ordered     Dose/Rate Route Frequency Start Stop    06/22/24 1717  levoFLOXacin (LEVAQUIN) 750 mg/150 mL D5W (premix) (LEVAQUIN) 750 mg        Ordering Provider: Saurabh Jo MD    750 " mg  100 mL/hr over 90 Minutes Intravenous Every 24 Hours 06/22/24 1815 06/27/24 1814    06/22/24 1709  metroNIDAZOLE (FLAGYL) IVPB 500 mg        Ordering Provider: Saurabh Jo MD    500 mg  200 mL/hr over 30 Minutes Intravenous Every 8 Hours 06/22/24 1800 06/26/24 1759    06/22/24 1709  Pharmacy to Dose LevoFLOXacin (LEVAQUIN)        Ordering Provider: Saurabh Jo MD     Does not apply Continuous PRN 06/22/24 1708 06/26/24 1707    06/22/24 0944  cefepime 2000 mg IVPB in 100 mL NS (MBP)        Ordering Provider: Maggie Bang MD    2,000 mg  over 4 Hours Intravenous Every 8 Hours 06/22/24 1000 06/25/24 0229    06/17/24 1524  cefepime 2000 mg IVPB in 100 mL NS (MBP)        Ordering Provider: Saulo Ambriz MD    2,000 mg  over 30 Minutes Intravenous Once 06/17/24 1615 06/17/24 2204    06/13/24 1938  methenamine (HIPREX) tablet 1 g        Ordering Provider: Ky Plascencia MD    1 g Oral 2 Times Daily 06/13/24 2100      06/12/24 2235  cefTRIAXone (ROCEPHIN) 1,000 mg in sodium chloride 0.9 % 100 mL MBP        Ordering Provider: Calvin Booth DO    1,000 mg  200 mL/hr over 30 Minutes Intravenous Once 06/12/24 2251 06/12/24 2318            Culture Results:  Lab Results   Component Value Date    MRSAPCR MRSA Detected (A) 04/22/2024    BLOODCX No growth at 5 days 06/12/2024    BLOODCX No growth at 5 days 06/12/2024    BCIDPCR Proteus spp. Identification by BCID2 PCR. (A) 05/24/2024    BCIDPCR (A) 05/02/2024     Enterobacter cloacae complex. Identification by BCID2 PCR.    URINECX No growth 06/21/2024    URINECX 25,000 CFU/mL Enterobacter cloacae complex CRE (A) 06/16/2024    WOUNDCX Light growth (2+) Escherichia coli (A) 04/23/2024    WOUNDCX Light growth (2+) Proteus mirabilis (A) 04/23/2024       Microbiology Results (last 10 days)       Procedure Component Value - Date/Time    Urine Culture - Urine, Urine, Catheter [[id number]]  (Normal) Collected: 06/21/24 1116    Lab Status: Final result  Specimen: Urine, Catheter Updated: 06/22/24 1152     Urine Culture No growth    Urine Culture - Urine, Urine, Clean Catch [[id number]]  (Abnormal)  (Susceptibility) Collected: 06/16/24 1219    Lab Status: Final result Specimen: Urine, Clean Catch Updated: 06/19/24 0936     Urine Culture 25,000 CFU/mL Enterobacter cloacae complex CRE     Comment:   Consider infectious disease consult.  Susceptibility results may not correlate to clinical outcomes.  Carbapenem resistant Enterobacteriaceae, patient may be an isolation risk.  No carbapenemase detected.       Narrative:      Colonization of the urinary tract without infection is common. Treatment is discouraged unless the patient is symptomatic, pregnant, or undergoing an invasive urologic procedure.    Susceptibility        Enterobacter cloacae complex CRE      CEPHEID GENEXPERT LICHA      Cefepime  2 ug/ml Susceptible      Ceftazidime  >=64 ug/ml Resistant      Ceftriaxone  >=64 ug/ml Resistant      Gentamicin  <=1 ug/ml Susceptible      Imipenem  1 ug/ml Intermediate      IMP Not Detected         KPC Not Detected         Levofloxacin  >=8 ug/ml Resistant      Meropenem  4 ug/ml Resistant      NDM Not Detected         Nitrofurantoin  256 ug/ml Resistant      OXA48 Not Detected         Piperacillin + Tazobactam  >=128 ug/ml Resistant      Trimethoprim + Sulfamethoxazole  160 ug/ml Resistant      VIM Not Detected                                COVID PRE-OP / PRE-PROCEDURE SCREENING ORDER (NO ISOLATION) - Swab, Nasopharynx [[id number]]  (Normal) Collected: 06/12/24 2125    Lab Status: Final result Specimen: Swab from Nasopharynx Updated: 06/12/24 2156    Narrative:      The following orders were created for panel order COVID PRE-OP / PRE-PROCEDURE SCREENING ORDER (NO ISOLATION) - Swab, Nasopharynx.  Procedure                               Abnormality         Status                     ---------                               -----------         ------                      COVID-19 and FLU A/B PCR...[[id number]]  Normal              Final result                 Please view results for these tests on the individual orders.    COVID-19 and FLU A/B PCR, 1 HR TAT - Swab, Nasopharynx [[id number]]  (Normal) Collected: 06/12/24 2125    Lab Status: Final result Specimen: Swab from Nasopharynx Updated: 06/12/24 2156     COVID19 Not Detected     Influenza A PCR Not Detected     Influenza B PCR Not Detected    Narrative:      Fact sheet for providers: https://www.fda.gov/media/693094/download    Fact sheet for patients: https://www.fda.gov/media/922433/download    Test performed by PCR.    Blood Culture - Blood, Arm, Left [[id number]]  (Normal) Collected: 06/12/24 2120    Lab Status: Final result Specimen: Blood from Arm, Left Updated: 06/17/24 2145     Blood Culture No growth at 5 days    Urine Culture - Urine, Straight Cath [[id number]]  (Abnormal)  (Susceptibility) Collected: 06/12/24 2117    Lab Status: Final result Specimen: Urine from Straight Cath Updated: 06/17/24 1203     Urine Culture >100,000 CFU/mL Enterobacter cloacae complex     Comment:   This organism may develop resistance during prolonged therapy with 3rd generation cephalosporins (e.g. ceftriaxone) as a result of de-repression of AmpC B-lactamase.  Ceftriaxone may be a reasonable treatment option for uncomplicated cystitis or other lower severity infections when susceptibility is demonstrated.         50,000 CFU/mL Mixed Selina Isolated    Narrative:      Specimen contains mixed organisms of questionable pathogenicity suggestive of contamination. If symptoms persist, suggest recollection.  Colonization of the urinary tract without infection is common. Treatment is discouraged unless the patient is symptomatic, pregnant, or undergoing an invasive urologic procedure.    Susceptibility        Enterobacter cloacae complex      LICHA      Cefepime <=1 ug/ml Susceptible      Ceftazidime 32 ug/ml Resistant      Ceftriaxone >=64 ug/ml  Resistant      Gentamicin <=1 ug/ml Susceptible      Imipenem 1 ug/ml Intermediate      Levofloxacin >=8 ug/ml Resistant      Nitrofurantoin 128 ug/ml Resistant      Piperacillin + Tazobactam >=128 ug/ml Resistant      Trimethoprim + Sulfamethoxazole >=320 ug/ml Resistant                           Blood Culture - Blood, Arm, Right [[id number]]  (Normal) Collected: 06/12/24 2112    Lab Status: Final result Specimen: Blood from Arm, Right Updated: 06/17/24 2145     Blood Culture No growth at 5 days            Lab Results   Component Value Date    WBC 6.21 06/22/2024    WBC 6.22 06/21/2024    WBC 6.32 06/20/2024     Temp Readings from Last 1 Encounters:   06/22/24 99.6 °F (37.6 °C) (Oral)

## 2024-06-22 NOTE — PROGRESS NOTES
Urology  Length of Stay: 1  Patient Care Team:  Moises Oliveira MD as PCP - General (Internal Medicine)  Moises Holman MD as Consulting Physician (Otolaryngology)  Chritsiano Rao PA as Physician Assistant (Otolaryngology)  Candelaria David MD as Obstetrician (Obstetrics and Gynecology)  Omar Hernandez MD as Cardiologist (Cardiology)  Leta Crawford APRN as Nurse Practitioner (Nurse Practitioner)  Leta Crawford APRN as Nurse Practitioner (Nurse Practitioner)    Chief Complaint: Status post ureteroscopic removal of stone    Subjective     Interval History:   Underwent ureteroscopic removal of stone  No fever overnight  No specific urologic complaints    Review of Systems:   Review of Systems    Objective       Intake/Output Summary (Last 24 hours) at 6/22/2024 0918  Last data filed at 6/22/2024 0630  Gross per 24 hour   Intake 350 ml   Output 500 ml   Net -150 ml       [REMOVED] External Urinary Catheter-Output (mL): 600 mL           Physical Exam:  Temp:  [97.5 °F (36.4 °C)-98.9 °F (37.2 °C)] 97.9 °F (36.6 °C)  Heart Rate:  [70-93] 86  Resp:  [16-20] 18  BP: (108-170)/(53-84) 156/65  He is alert, still seems to be a little confused  Abdomen is soft without tenderness       Results Review:       I reviewed the patient's new clinical results.  Lab Results (last 24 hours)       Procedure Component Value Units Date/Time    Comprehensive Metabolic Panel [[id number]]  (Abnormal) Collected: 06/22/24 0453    Specimen: Blood Updated: 06/22/24 0534     Glucose 99 mg/dL      BUN 16 mg/dL      Creatinine 0.93 mg/dL      Sodium 138 mmol/L      Potassium 4.2 mmol/L      Chloride 103 mmol/L      CO2 28.0 mmol/L      Calcium 9.1 mg/dL      Total Protein 6.1 g/dL      Albumin 2.6 g/dL      ALT (SGPT) 18 U/L      AST (SGOT) 19 U/L      Alkaline Phosphatase 81 U/L      Total Bilirubin 0.4 mg/dL      Globulin 3.5 gm/dL      A/G Ratio 0.7 g/dL      BUN/Creatinine Ratio 17.2     Anion Gap 7.0 mmol/L      eGFR 66.3  mL/min/1.73     Narrative:      GFR Normal >60  Chronic Kidney Disease <60  Kidney Failure <15      CBC (No Diff) [[id number]]  (Abnormal) Collected: 06/22/24 0453    Specimen: Blood Updated: 06/22/24 0515     WBC 6.21 10*3/mm3      RBC 2.86 10*6/mm3      Hemoglobin 8.1 g/dL      Hematocrit 27.2 %      MCV 95.1 fL      MCH 28.3 pg      MCHC 29.8 g/dL      RDW 18.2 %      RDW-SD 62.5 fl      MPV 10.0 fL      Platelets 185 10*3/mm3     Urine Culture - Urine, Urine, Catheter [[id number]] Collected: 06/21/24 1116    Specimen: Urine, Catheter Updated: 06/21/24 1133          Imaging Results (Last 24 Hours)       Procedure Component Value Units Date/Time    XR Abdomen 1 View [[id number]] Collected: 06/21/24 1138     Updated: 06/21/24 1142    Narrative:      XR ABDOMEN 1 VW- 6/21/2024 10:00 AM     HISTORY: stent; N39.0-Urinary tract infection, site not specified;  R41.82-Altered mental status, unspecified; S31.819S-Unspecified open  wound of right buttock, sequela; L72.9-Follicular cyst of the skin and  subcutaneous tissue, unspecified; R13.10-Dysphagia, unspecified;  Z74.09-Other reduced mobility; N13.2-Hydronephrosis with renal and  ureteral calculous obstruction     COMPARISON: None     FLUOROSCOPY TIME: 0.3 minutes     FLUOROSCOPY DOSE: 2.8 mGy     NUMBER OF IMAGES: 3       Impression:         Intraoperative fluoroscopic images during cystoscopy with left  uteroscopy and stent placement.     Please refer to the operative note for more details.        This report was signed and finalized on 6/21/2024 11:39 AM by Dr. Wesley Mcfadden MD.       [place] Surgery Fluoro [[id number]] Resulted: 06/21/24 1140     Updated: 06/21/24 1140    Narrative:      This procedure was auto-finalized with no dictation required.            Medication Review:     Current Facility-Administered Medications:     acetaminophen (TYLENOL) tablet 650 mg, 650 mg, Oral, Q6H PRN, TemoKy tan MD, 650 mg at 06/19/24 1403    apixaban (ELIQUIS) tablet 5 mg, 5  mg, Oral, BID, Ky Plascencia MD, 5 mg at 06/21/24 2309    ascorbic acid (VITAMIN C) tablet 500 mg, 500 mg, Oral, Daily, Ky Plascencia MD, 500 mg at 06/20/24 0959    aspirin EC tablet 81 mg, 81 mg, Oral, Daily, Ky Plascencia MD, 81 mg at 06/20/24 0958    sennosides-docusate (PERICOLACE) 8.6-50 MG per tablet 2 tablet, 2 tablet, Oral, BID PRN **AND** polyethylene glycol (MIRALAX) packet 17 g, 17 g, Oral, Daily PRN, 17 g at 06/15/24 1553 **AND** bisacodyl (DULCOLAX) EC tablet 5 mg, 5 mg, Oral, Daily PRN **AND** bisacodyl (DULCOLAX) suppository 10 mg, 10 mg, Rectal, Daily PRN, Ky Plascencia MD    Calcium Replacement - Follow Nurse / BPA Driven Protocol, , Does not apply, PRN, Ky Plascencia MD    carvedilol (COREG) tablet 25 mg, 25 mg, Oral, BID With Meals, Ky Plascencia MD, 25 mg at 06/20/24 0959    cefepime 2000 mg IVPB in 100 mL NS (MBP), 2,000 mg, Intravenous, Q12H, Ky Plascencia MD, Last Rate: 0 mL/hr at 06/21/24 0345, 2,000 mg at 06/21/24 2359    Diclofenac Sodium (VOLTAREN) 1 % gel 4 g, 4 g, Topical, 4x Daily PRN, Ky Plascencia MD    DULoxetine (CYMBALTA) DR capsule 60 mg, 60 mg, Oral, Daily, Ky Plascencia MD, 60 mg at 06/20/24 0958    estradiol (ESTRACE) vaginal cream 2 applicator, 2 g, Vaginal, Daily, Ky Plascencia MD, 2 applicator at 06/20/24 1000    ferrous sulfate tablet 325 mg, 325 mg, Oral, Daily With Breakfast, Ky Plascencia MD, 325 mg at 06/20/24 0959    folic acid (FOLVITE) tablet 1,000 mcg, 1,000 mcg, Oral, Daily, Ky Plascencia MD, 1,000 mcg at 06/20/24 0958    furosemide (LASIX) tablet 40 mg, 40 mg, Oral, Daily, Ky Plascencia MD, 40 mg at 06/20/24 0958    isosorbide mononitrate (IMDUR) 24 hr tablet 60 mg, 60 mg, Oral, Daily, Ky Plascencia MD, 60 mg at 06/20/24 0958    leflunomide (ARAVA) tablet 20 mg, 20 mg, Oral, Daily, Ky Plascencia MD, 20 mg at 06/20/24 0958    levothyroxine (SYNTHROID, LEVOTHROID) tablet 75 mcg, 75 mcg, Oral, Q AM, Temo  Ky BECKMAN MD, 75 mcg at 06/20/24 0613    Lidocaine 4 % 1 patch, 1 patch, Transdermal, Daily PRN, Ky Plascencia MD    losartan (COZAAR) tablet 50 mg, 50 mg, Oral, Daily, Ky Plascencia MD, 50 mg at 06/20/24 0958    Magnesium Low Dose Replacement - Follow Nurse / BPA Driven Protocol, , Does not apply, PRNTemo Donald L, MD    methenamine (HIPREX) tablet 1 g, 1 g, Oral, BID, Ky Plascencia MD, 1 g at 06/21/24 2310    multivitamin with minerals 1 tablet, 1 tablet, Oral, Daily, Ky Plascencia MD, 1 tablet at 06/20/24 0959    [DISCONTINUED] Morphine sulfate (PF) injection 1 mg, 1 mg, Intravenous, Q4H PRN **AND** naloxone (NARCAN) injection 0.4 mg, 0.4 mg, Intravenous, Q5 Min PRN, Ky Plascencia MD    nitroglycerin (NITROSTAT) SL tablet 0.4 mg, 0.4 mg, Sublingual, Q5 Min PRN, Ky Plascencia MD    ondansetron (ZOFRAN) injection 4 mg, 4 mg, Intravenous, Q6H PRN, Ky Plascencia MD, 4 mg at 06/18/24 1717    pantoprazole (PROTONIX) EC tablet 40 mg, 40 mg, Oral, Daily, Ky Plascencia MD, 40 mg at 06/20/24 0958    Phosphorus Replacement - Follow Nurse / BPA Driven Protocol, , Does not apply, PRNTemo Donald L, MD    Potassium Replacement - Follow Nurse / BPA Driven Protocol, , Does not apply, PRNTemo Donald L, MD    sodium chloride 0.9 % flush 10 mL, 10 mL, Intravenous, PRN, Ky Plascencia MD, 10 mL at 06/12/24 2200    sodium chloride 0.9 % flush 10 mL, 10 mL, Intravenous, Q12H, Ky Plascencia MD, 10 mL at 06/21/24 2310    sodium chloride 0.9 % flush 10 mL, 10 mL, Intravenous, PRN, Ky Plascencia MD    sodium chloride 0.9 % infusion 40 mL, 40 mL, Intravenous, PRN, Ky Plascencia MD    spironolactone (ALDACTONE) tablet 25 mg, 25 mg, Oral, Daily, Ky Plascencia MD, 25 mg at 06/20/24 0958    traMADol (ULTRAM) tablet 50 mg, 50 mg, Oral, Q12H PRN, Ky Plascencia MD, 50 mg at 06/20/24 2012    Assessment/Plan:   Left mid ureteral calculus with obstruction-status post ureteroscopic  laser lithotripsy with placement of stent.  Stent will be removed early next week.  Has string tucked in vaginal cavity.    Neurogenic bladder with worsening incontinence and at times failure to empty.  Complex voiding picture.  Probably should eventually be evaluated by an expert in neurogenic bladder such as at [facility].    Recurrent urinary tract infection-stone could certainly be playing a role in this.  Hopefully will see a decrease since we have had removal of the stone and the obstruction but her risk is high due to her neurogenic bladder.      (Please note that portions of this note were completed with a voice recognition program.)  Ky Plascencia MD  06/22/24  09:18 CDT

## 2024-06-22 NOTE — PROGRESS NOTES
[facility]   Progress Note    Patient Name: Tawny Shin  : 1955  MRN: [id number]  Primary Care Physician:  Moises Oliveira MD  Date of admission: 2024    Subjective   Subjective    Awake, alert. Some nausea.        Objective     Vitals:   Temp:  [97.8 °F (36.6 °C)-98.9 °F (37.2 °C)] 97.9 °F (36.6 °C)  Heart Rate:  [70-93] 86  Resp:  [16-20] 18  BP: (108-170)/(53-84) 156/65  Flow (L/min):  [2] 2  Physical Exam    Constitutional: Awake, alert   Gastrointestinal: soft, NT, ND   Neurologic: Oriented x 3, strength symmetric in all extremities, Cranial Nerves grossly intact to confrontation, speech clear   Skin: No rashes     Result Review    Result Review:  I have personally reviewed the results from the time of this admission to 2024 11:49 CDT and agree with these findings:  [x]  Laboratory list / accordion  []  Microbiology  [x]  Radiology  []  EKG/Telemetry   []  Cardiology/Vascular   []  Pathology  []  Old records  []  Other:    Assessment & Plan   Assessment / Plan     Brief Patient Summary:  Tawny Shin is a 68 y.o. female who presents with cholelithiasis    Active Hospital Problems:  Active Hospital Problems    Diagnosis     **Altered mental status     Esophageal dysphagia     UTI (urinary tract infection)     Hydronephrosis with urinary obstruction due to ureteral calculus     Hypothyroidism     Stage 3b chronic kidney disease     Essential hypertension     Sick sinus syndrome     Venous insufficiency     Rheumatoid arthritis involving multiple sites     Chronic heart failure with preserved ejection fraction     Chronic anticoagulation     Chronic embolism and thrombosis of unspecified deep veins of left lower extremity      Plan:   HIDA scan to r/o acute cholecystitis  NPO  IVF  IV antibiotics    VTE Prophylaxis:  Pharmacologic VTE prophylaxis orders are present.        CODE STATUS:   Medical Intervention Limits: Other  Code Status (Patient has no pulse and is not breathing):  No CPR (Do Not Attempt to Resuscitate)  Medical Interventions (Patient has pulse or is breathing): Limited Support  Additional Medical Interventions Limits: CPR        MD Saurabh Argueta MD  General Surgery  06/22/24  11:49 CDT

## 2024-06-22 NOTE — PLAN OF CARE
Goal Outcome Evaluation:   VSS. US gallbladder and HIDA scan this shift. 3 way kay and CBI continued. Urine pink tinged and free from clots. Will continue to monitor.

## 2024-06-22 NOTE — PLAN OF CARE
Goal Outcome Evaluation:  Plan of Care Reviewed With: patient        Progress: improving  Outcome Evaluation: Patient A/O x 4. VSS. On room air. Tolerating IV ABT. No PRNs requested. Has been NPO since midnight for HIDA scan. Caregiver at bedside. Safety maintained.    CBI in place, draining pink tinged urine.

## 2024-06-22 NOTE — PROGRESS NOTES
[facility] Medicine Services  INPATIENT PROGRESS NOTE    Patient Name: Tawny Shin  Date of Admission: 6/12/2024  Today's Date: 06/22/24  Length of Stay: 1  Primary Care Physician: Moises Oliveira MD    Subjective   Chief Complaint: Recurrent UTI, gallstone, kidney stone   HPI   Blood pressure slightly high but stable, afebrile.  Recommendation from general surgery discussed with family and patient.  Creatinine normalized.  Hemoglobin increased.  White blood cells normal.  Patient is on 2 L of oxygen.    Review of Systems   Constitutional:  Positive for activity change, appetite change and fatigue. Negative for chills and fever.   HENT:  Negative for hearing loss, nosebleeds, tinnitus and trouble swallowing.    Eyes:  Negative for visual disturbance.   Respiratory:  Negative for cough, chest tightness, shortness of breath and wheezing.    Cardiovascular:  Negative for chest pain, palpitations and leg swelling.   Gastrointestinal:  Negative for abdominal distention, abdominal pain, blood in stool, constipation, diarrhea, nausea and vomiting.   Endocrine: Negative for cold intolerance, heat intolerance, polydipsia, polyphagia and polyuria.   Genitourinary:  Negative for decreased urine volume, difficulty urinating, dysuria, flank pain, frequency and hematuria.   Musculoskeletal:  Positive for arthralgias, gait problem and myalgias. Negative for joint swelling.   Skin:  Negative for rash.   Allergic/Immunologic: Negative for immunocompromised state.   Neurological:  Positive for weakness. Negative for dizziness, syncope, light-headedness and headaches.   Hematological:  Negative for adenopathy. Does not bruise/bleed easily.   All pertinent negatives and positives are as above. All other systems have been reviewed and are negative unless otherwise stated.     Objective    Temp:  [97.5 °F (36.4 °C)-98.9 °F (37.2 °C)] 97.9 °F (36.6 °C)  Heart Rate:  [70-93] 86  Resp:  [16-20]  18  BP: (108-170)/(53-84) 156/65  Physical Exam  Vitals and nursing note reviewed.   Constitutional:       Comments: Chronically ill.  Deconditioning.   HENT:      Head: Normocephalic.   Eyes:      Conjunctiva/sclera: Conjunctivae normal.      Pupils: Pupils are equal, round, and reactive to light.   Cardiovascular:      Rate and Rhythm: Normal rate and regular rhythm.      Heart sounds: Normal heart sounds.   Pulmonary:      Effort: Pulmonary effort is normal. No respiratory distress.      Breath sounds: Normal breath sounds.   Abdominal:      General: Bowel sounds are normal. There is no distension.      Palpations: Abdomen is soft.      Comments: Obesity.  Slight right upper quadrant tenderness with palpation.    Musculoskeletal:         General: No swelling.      Cervical back: Neck supple.   Skin:     General: Skin is warm and dry.      Capillary Refill: Capillary refill takes 2 to 3 seconds.      Findings: No rash.   Neurological:      Mental Status: She is alert and oriented to person, place, and time.      Motor: Weakness present.      Coordination: Coordination abnormal.      Gait: Gait abnormal.   Psychiatric:         Mood and Affect: Mood normal.         Behavior: Behavior normal.         Thought Content: Thought content normal.          Results Review:  I have reviewed the labs, radiology results, and diagnostic studies.    Laboratory Data:   Results from last 7 days   Lab Units 06/22/24  0453 06/21/24 0428 06/20/24  0354   WBC 10*3/mm3 6.21 6.22 6.32   HEMOGLOBIN g/dL 8.1* 7.8* 7.5*   HEMATOCRIT % 27.2* 25.4* 25.2*   PLATELETS 10*3/mm3 185 200 192        Results from last 7 days   Lab Units 06/22/24  0453 06/21/24  0428 06/20/24  0354   SODIUM mmol/L 138 137 133*   POTASSIUM mmol/L 4.2 3.8 3.4*   CHLORIDE mmol/L 103 101 97*   CO2 mmol/L 28.0 29.0 28.0   BUN mg/dL 16 18 22   CREATININE mg/dL 0.93 1.01* 1.07*   CALCIUM mg/dL 9.1 9.0 8.6   BILIRUBIN mg/dL 0.4  --   --    ALK PHOS U/L 81  --   --    ALT  (SGPT) U/L 18  --   --    AST (SGOT) U/L 19  --   --    GLUCOSE mg/dL 99 98 97       Culture Data:   Urine Culture   Date Value Ref Range Status   06/16/2024 25,000 CFU/mL Enterobacter cloacae complex CRE (A)  Final     Comment:       Consider infectious disease consult.  Susceptibility results may not correlate to clinical outcomes.  Carbapenem resistant Enterobacteriaceae, patient may be an isolation risk.  No carbapenemase detected.       Radiology Data:   Imaging Results (Last 24 Hours)       Procedure Component Value Units Date/Time    US Gallbladder [[id number]] Collected: 06/22/24 1037     Updated: 06/22/24 1043    Narrative:      US GALLBLADDER-     HISTORY: Quadrant pain, gallstones     COMPARISON: CT abdomen pelvis 6/20/2024     FINDINGS: Sonographic imaging of the right upper quadrant is performed.     Images of the pancreas are unremarkable. The proximal and mid abdominal  aorta are normal in caliber. Distal aorta is obscured by overlying  shadowing bowel gas. Proximal IVC is patent.     No focal liver mass is identified. Appropriate directional flow within  the main portal vein. Gallbladder is moderately distended containing  small shadowing stones and sludge within the region of the gallbladder  neck. No gallbladder wall thickening or pericholecystic fluid is  visualized. Common bile duct is normal in caliber measuring 4 mm.     Right kidney appears normal measuring 10.9 cm in length.       Impression:      1. Cholelithiasis with small shadowing stones and sludge within the  region of the gallbladder neck. Moderate distention of the gallbladder,  however no gallbladder wall thickening or pericholecystic fluid  identified. No biliary dilatation.     This report was signed and finalized on 6/22/2024 10:39 AM by Dr. Trinity Gómez MD.       XR Abdomen 1 View [[id number]] Collected: 06/21/24 1138     Updated: 06/21/24 1142    Narrative:      XR ABDOMEN 1 VW- 6/21/2024 10:00 AM     HISTORY: stent;  N39.0-Urinary tract infection, site not specified;  R41.82-Altered mental status, unspecified; S31.819S-Unspecified open  wound of right buttock, sequela; L72.9-Follicular cyst of the skin and  subcutaneous tissue, unspecified; R13.10-Dysphagia, unspecified;  Z74.09-Other reduced mobility; N13.2-Hydronephrosis with renal and  ureteral calculous obstruction     COMPARISON: None     FLUOROSCOPY TIME: 0.3 minutes     FLUOROSCOPY DOSE: 2.8 mGy     NUMBER OF IMAGES: 3       Impression:         Intraoperative fluoroscopic images during cystoscopy with left  uteroscopy and stent placement.     Please refer to the operative note for more details.        This report was signed and finalized on 6/21/2024 11:39 AM by Dr. Wesley Mcfadden MD.       FL Surgery Fluoro [[id number]] Resulted: 06/21/24 1140     Updated: 06/21/24 1140    Narrative:      This procedure was auto-finalized with no dictation required.            I have reviewed the patient's current medications.     Assessment/Plan   Assessment  Active Hospital Problems    Diagnosis     **Altered mental status     Esophageal dysphagia     UTI (urinary tract infection)     Hydronephrosis with urinary obstruction due to ureteral calculus     Hypothyroidism     Stage 3b chronic kidney disease     Essential hypertension     Sick sinus syndrome     Venous insufficiency     Rheumatoid arthritis involving multiple sites     Chronic heart failure with preserved ejection fraction     Chronic anticoagulation     Chronic embolism and thrombosis of unspecified deep veins of left lower extremity        Treatment Plan    Recurrent UTI/questionable neurogenic bladder.  Cefepime antibiotics.  Continue Hip-Stef.  Repeat UA, negative for bacteria.  Consult ID.  Urology consult.  CT scan abdomen pelvic . Obstructive uropathy on the left with mild to moderate dilatation of the upper tracts of the left kidney and left ureter into the true pelvis where there is a 5 x 6 mm calculus within the  left ureter approximately 4 to 5 cm above the UVJ- right ureter is decompressed and normal in appearance- No evidence of renal mass. No perinephric fluid collection present, gallbladder is mildly distended- gallstones within the gallbladder neck,  No pericholecystic inflammatory changes or biliary dilatation, Constipation,  No obstruction or free air, Normal appendix, Calcified fibroid within the lower uterine segment, Mild constipation, No obstruction or free air, Normal appendix, Small fat-containing periumbilical hernia, Previous kyphoplasty at T12, Previous fusion at L3-L4 and L4-L5, Left basilar atelectasis..  Recommendation from urology-evaluation of neurogenic bladder at a university setting like [facility].     Obstructing renal calculi left side/moderate dilated of left ureter.  Callus 5 x 6 mm and left 4 to 5 cm above the UVJ junction.  Status post 6/21/2024 urethroscope laser lithotripsy with stent insertion left     Gallstone, within the gallbladder neck/dysfunctional gallbladder.  Mild distended gallbladder.  Right upper quadrant tenderness.  Ultrasound of the gallbladder-Cholelithiasis with small shadowing stones and sludge within the region of the gallbladder neck- Moderate distention of the gallbladder- however no gallbladder wall thickening or pericholecystic fluid identified- No biliary dilatation.  HIDA scan-positive.  Consult general surgery.  Discussed with general surgery Dr. Jo-plan for gallbladder removal this coming Tuesday, DC Eliquis today 6/22/2024 start Lovenox today, nursing communication to stop Lovenox 24 hours before surgery on Monday.  Dr. Jo also recommended cardiac clearance for surgery.  I put in cardiac consult today.    Reflux/esophageal dysphagia. Dysphagia recent esophageal stretching, GI evaluated and recommended to continue outpatient follow-up.  SLP evaluated and recommended to continue regular diet.  Protonix . Zofran as needed.    Altered mental status.   Resolved.  CT scan the head-No acute intracranial abnormality, Chronic sinusitis and left mastoid effusion.  Chest x-ray-Stable chest exam without acute process, No visualized infiltrate.   Patient is on 2 L of oxygen.     Hypothyroidism.  Synthroid.     Chronic stage IIIb renal failure.  Creatinine normalized.     Hypomagnesia. Resolved.     Hyponatremia.  Resolved.     Hypokalemia.  Resolved.  Magnesium normal.     Hypertension/sick sinus syndrome/venous insufficiency/CHF.  Eliquis.  Aspirin . Coreg . Lasix.  Imdur.  Cozaar . Aldactone.  Nitro as needed.     Right gluteal wound.  Consult wound care.     Rheumatoid arthritis. Arava.     Depression/anxiety.  Cymbalta.     Postmenopausal.  Estradiol cream.     Anemia.  Hemoglobin increasing.  Iron sulfate.  Folic acid.  No sign of acute bleed.     Pain . lidocaine patch.  Voltaren gel.  Ultram as needed.     Chronic DVT.     Nutrition.  Cardiac diet.  Vitamin C.  Multivitamins.  Regular/house diet.  Boost supplement.     Deconditioning.  PT and OT consult.  Patient use a walker and bedbound at baseline.     Urine culture-100,000 CFU/mL Enterobacter cloacae complex  25,000 CFU/mL Enterobacter cloacae complex CRE   Blood culture-no growth for 5 days.     Dr. Shin's mom.  Patient already have home health at home.  Patient request for lift at discharge.     Medical Decision Making  Number and Complexity of problems: Recurrent UTI/altered mental status/reflux/rheumatoid arthritis/chronic 3B renal failure  Differential Diagnosis: None     Conditions and Status        Condition is unchanged.     [facility] Data  External documents reviewed: Previous note .  Cardiac tracing (EKG, telemetry) interpretation: Sinus.  Radiology interpretation: X-ray/CT scan of the head  Labs reviewed: Laboratory  Any tests that were considered but not ordered: Lab in a.m.     Decision rules/scores evaluated (example [id number], Wells, etc): None     Discussed with: Patient and family     Care  Planning  Shared decision making: Patient and family  Code status and discussions: DNR     Disposition  Social Determinants of Health that impact treatment or disposition: From home  1 to 3 days.    Electronically signed by Saulo Ambriz MD, 06/22/24, 11:15 CDT.

## 2024-06-22 NOTE — CONSULTS
[facility] General Surgery Services - Consult Note    Patient Name: Tawny Shin  : 1955  MRN: [id number]  Primary Care Physician:  Moises Oliveira MD  Referring Physician: Wes Andrews MD  Date of admission: 2024    Inpatient General Surgery Consult  Consult performed by: Saurabh Jo MD  Consult ordered by: Saulo Ambriz MD          Subjective      Reason for Consult/ Chief Complaint: abdominal pain    History of Present Illness: Tawny Shin is a 68 y.o. female who was found to be lethargic and admitted for further investigation. She was found to have hydronephrosis and a lithotripsy was performed today. The patient has Parkinsons and further history is not available. The patient is an unreliable historian and reports pain most places on her body.     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    Elevated cholesterol     Eustachian tube dysfunction     Heart disease     Herpes simplex     History of transfusion     Hyperlipidemia     Hypertension     Hypothyroidism 2020    Intrinsic asthma 2020    Knee dislocation     Labral tear of right hip joint     Laryngitis sicca     Laryngitis, chronic     Left carotid bruit 2016    MI (myocardial infarction)     Myalgia due to statin 2019    Open wound of right hip 2021    Osteomyelitis of right femur 2021    Otorrhea     Pacemaker  "11/17/2016    Primary osteoarthritis of left knee 12/23/2020    Psoriasis vulgaris 12/23/2020    S/P coronary artery stent placement 03/09/2016    Sensorineural hearing loss     Seropositive rheumatoid arthritis of multiple sites 12/27/2019    Overview:  -myochrysine '93-'96 -methotrexate '96--->11/98;r/s  restarted 2/99--> 8/14 (anemia) -sulfasalazine- not effective -penicillamine 6/98-->10/98; no effect -leflunomide 11/98--> - Humira '13-->didn't take - Enbrel 12/14-->3/15- no effect!   Last Assessment & Plan:  - \"aching all over\" because she had to be off her anti-rheumatic drugs for 2 weeks in preparation for her R knee surgery - he    Sick sinus syndrome 12/27/2019    Sjogren's disease     Spondylolisthesis of lumbar region 01/17/2018    Syncope, recurrent 02/08/2021    Urinary tract infection     UTI (urinary tract infection) 04/19/2024       Past Surgical History:   Procedure Laterality Date    A-V CARDIAC PACEMAKER INSERTION  2016    ATRIAL CARDIAC PACEMAKER INSERTION      CARDIAC CATHETERIZATION      CATARACT EXTRACTION      CERVICAL CORPECTOMY N/A 3/3/2021    Procedure: CERVICAL 6 CORPECTOMY WITH TITANIUM CAGE WITH NEURO MONITORING;  Surgeon: Bandar Shea MD;  Location: [facility] OR;  Service: Neurosurgery;  Laterality: N/A;    COLONOSCOPY  11/08/2011    One fold in the ascending colon which showed ulcer otherwise normal exam    COLONOSCOPY  11/12/2004    Normal exam repeat in five years    CORONARY ANGIOPLASTY WITH STENT PLACEMENT      X 2; 2013 & 2014    ENDOSCOPY  07/10/2014    Normal exam    ENDOSCOPY N/A 5/30/2024    Procedure: ESOPHAGOGASTRODUODENOSCOPY WITH ANESTHESIA;  Surgeon: Taiwo Sanchez MD;  Location: [facility] ENDOSCOPY;  Service: Gastroenterology;  Laterality: N/A;  preop; dysphagia  postop: balloon dilation  PCP Jesus Manuel jeff    FLAP LEG Right 9/14/2021    Procedure: RIGHT GLUTEAL FASCIOCUTANEOUS ADVANCEMENT FLAP AND RIGHT TENSOR FASCIAL JESSICA FLAP;  Surgeon: Amadeo Turner MD;  " Location:  [facility] OR;  Service: Plastics;  Laterality: Right;    HIP ABDUCTION TENOTOMY BILATERAL Right 1/14/2021    Procedure: RIGHT HIP GLUTEUS MEDLUS / MINIMUS REPAIR, POSSIBLE ACHILLES ALLOGRAFT;  Surgeon: Nino Carlson MD;  Location:  [facility] OR;  Service: Orthopedics;  Laterality: Right;    INCISION AND DRAINAGE ABSCESS Right 6/4/2022    Procedure: INCISION AND DRAINAGE ABSCESS right hip;  Surgeon: Magda Salcido MD;  Location:  [facility] OR;  Service: General;  Laterality: Right;    INCISION AND DRAINAGE ABSCESS Right 6/10/2022    Procedure: RIGHT HIP INCISION AND DRAINAGE. MD NEEDS 3L VANC IRRIGATION, CURRETTES, DAICANS, KERLEX ROLLS;  Surgeon: Amadeo Turner MD;  Location:  [facility] OR;  Service: Plastics;  Laterality: Right;    INCISION AND DRAINAGE HIP Right 2/9/2021    Procedure: HIP INCISION AND DRAINAGE;  Surgeon: Nino Carlson MD;  Location:  [facility] OR;  Service: Orthopedics;  Laterality: Right;    INCISION AND DRAINAGE LEG Right 10/24/2021    Procedure: INCISION AND DRAINAGE LOWER EXTREMITY;  Surgeon: Amadeo Turner MD;  Location:  [facility] OR;  Service: Plastics;  Laterality: Right;    INCISION AND DRAINAGE OF WOUND Right 7/8/2021    Procedure: INCISION AND DRAINAGE WOUND RIGHT HIP;  Surgeon: James Huntley MD;  Location:  [facility] OR;  Service: Orthopedics;  Laterality: Right;    JOINT REPLACEMENT      KYPHOPLASTY WITH BIOPSY Bilateral 10/26/2021    Procedure: THOARCIC 12 KYPHOPLASTY WITH BIOPSY;  Surgeon: Bandar Shea MD;  Location:  [facility] OR;  Service: Neurosurgery;  Laterality: Bilateral;    LEG DEBRIDEMENT Right 9/14/2021    Procedure: DEBRIDEMENT OF RIGHT HIP WOUND, RIGHT GLUTEAL FASCIOCUTANEOUS ADVANCEMENT FLAP AND RIGHT TENSOR FASCIAL JESSICA FLAP;  Surgeon: Amadeo Turner MD;  Location:  [facility] OR;  Service: Plastics;  Laterality: Right;    LUMBAR DISCECTOMY Right 3/23/2021    Procedure: LUMBAR DISCECTOMY MICRO, Lumbar 1/2 right;  Surgeon: Bandar Shea MD;  Location:   [facility] OR;  Service: Neurosurgery;  Laterality: Right;    LUMBAR FUSION N/A 1/19/2018    Procedure: L3-4,L4-5 DECOMPRESSION, POSTERIOR SPINAL FUSION WITH INSTRUMENTATION;  Surgeon: Fortino Oropeza MD;  Location:  [facility] OR;  Service:     LUMBAR LAMINECTOMY WITH FUSION Left 1/17/2018    Procedure: LEFT L3-4 L4-5 LATERAL LUMBAR INTERBODY FUSION;  Surgeon: Fortino Oropeza MD;  Location:  [facility] OR;  Service:     MASS EXCISION Right 4/23/2024    Procedure: RIGHT BUTTOCK MASS EXCISION;  Surgeon: Moises Keyes MD;  Location:  [facility] OR;  Service: General;  Laterality: Right;    MYRINGOTOMY W/ TUBES  09/04/2014    TUBES NO LONGER IN PLACE    OTHER SURGICAL HISTORY      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 so hardware was removed and spacers were placed    REPLACEMENT TOTAL KNEE Right        Family History: family history includes Cancer in her mother; Heart disease in her father. Otherwise pertinent FHx was reviewed and not pertinent to current issue.    Social History:  reports that she has never smoked. She has never been exposed to tobacco smoke. She has never used smokeless tobacco. She reports that she does not drink alcohol and does not use drugs.    Home Medications:   DULoxetine, Diclofenac Sodium, Vitamin C, acetaminophen, apixaban, aspirin, carvedilol, estradiol, ferrous sulfate, folic acid, furosemide, isosorbide mononitrate, leflunomide, levothyroxine, lidocaine, losartan, methenamine, multivitamin with minerals, nitroglycerin, pantoprazole, predniSONE, spironolactone, traMADol, and valACYclovir    Allergies:  Allergies   Allergen Reactions    Atorvastatin Other (See Comments)     LEG CRAMPS      Amoxicillin Rash    Escitalopram Rash    Nabumetone Rash    Niacin Er Rash    Penicillin G Rash    Penicillins Rash    Simvastatin Rash         Objective      Vitals:  Temp:  [97.5 °F (36.4 °C)-98.9 °F (37.2 °C)] 98.9 °F (37.2 °C)  Heart Rate:  [70-83] 80  Resp:  [16-20] 16  BP: (108-170)/(46-84) 159/54  Flow (L/min):   [2-8] 2    Physical Exam  Constitutional:       General: NAD     Appearance: Normal appearance.   HENT:      Head: Normocephalic and atraumatic.   Eyes:      Extraocular Movements: Extraocular movements intact.      Pupils: Pupils are equal, round, and reactive to light.   Cardiovascular:      Rate and Rhythm: Normal rate and regular rhythm.      Pulses: Normal pulses.   Pulmonary:      Effort: Pulmonary effort is normal. No respiratory distress.      Breath sounds: Normal breath sounds.   Abdominal:      Soft, bilateral upper quadrant TTP, ND       Result Review    Result Review:  I have personally reviewed the results from the time of this admission to 6/21/2024 19:20 CDT and agree with these findings:  [x]  Laboratory  []  Microbiology  [x]  Radiology  []  EKG/Telemetry   []  Cardiology/Vascular   []  Pathology  []  Old records  []  Other:  Most notable findings include: CT reviewed and stone at the gallbladder neck seen      Assessment & Plan        Active Hospital Problems:  Active Hospital Problems    Diagnosis     **Altered mental status     Esophageal dysphagia     UTI (urinary tract infection)     Hydronephrosis with urinary obstruction due to ureteral calculus     Hypothyroidism     Stage 3b chronic kidney disease     Essential hypertension     Sick sinus syndrome     Venous insufficiency     Rheumatoid arthritis involving multiple sites     Chronic heart failure with preserved ejection fraction     Chronic anticoagulation     Chronic embolism and thrombosis of unspecified deep veins of left lower extremity      Plan:   HIDA scan to r/o acute cholecystitis  If HIDA scan is negative, recommend outpatient f/u for cholecystectomy due to symptomatic cholelithiasis  Patient has 2 cardiac risk factors and is on eliquis. Will recommend IR cholecystostomy tube if HIDA scan is positive.  No acute surgical intervention      Saurabh Jo MD  General Surgery  06/21/24  19:20 CDT

## 2024-06-23 LAB
ANION GAP SERPL CALCULATED.3IONS-SCNC: 12 MMOL/L (ref 5–15)
BUN SERPL-MCNC: 14 MG/DL (ref 8–23)
BUN/CREAT SERPL: 16.3 (ref 7–25)
CALCIUM SPEC-SCNC: 8.6 MG/DL (ref 8.6–10.5)
CHLORIDE SERPL-SCNC: 102 MMOL/L (ref 98–107)
CO2 SERPL-SCNC: 25 MMOL/L (ref 22–29)
CREAT SERPL-MCNC: 0.86 MG/DL (ref 0.57–1)
DEPRECATED RDW RBC AUTO: 64.7 FL (ref 37–54)
EGFRCR SERPLBLD CKD-EPI 2021: 72.8 ML/MIN/1.73
ERYTHROCYTE [DISTWIDTH] IN BLOOD BY AUTOMATED COUNT: 18.6 % (ref 12.3–15.4)
GLUCOSE SERPL-MCNC: 90 MG/DL (ref 65–99)
HCT VFR BLD AUTO: 26 % (ref 34–46.6)
HGB BLD-MCNC: 7.7 G/DL (ref 12–15.9)
MCH RBC QN AUTO: 28 PG (ref 26.6–33)
MCHC RBC AUTO-ENTMCNC: 29.6 G/DL (ref 31.5–35.7)
MCV RBC AUTO: 94.5 FL (ref 79–97)
PLATELET # BLD AUTO: 170 10*3/MM3 (ref 140–450)
PMV BLD AUTO: 10.2 FL (ref 6–12)
POTASSIUM SERPL-SCNC: 3.8 MMOL/L (ref 3.5–5.2)
RBC # BLD AUTO: 2.75 10*6/MM3 (ref 3.77–5.28)
SODIUM SERPL-SCNC: 139 MMOL/L (ref 136–145)
WBC NRBC COR # BLD AUTO: 5.03 10*3/MM3 (ref 3.4–10.8)

## 2024-06-23 PROCEDURE — 97530 THERAPEUTIC ACTIVITIES: CPT

## 2024-06-23 PROCEDURE — 25010000002 ONDANSETRON PER 1 MG: Performed by: UROLOGY

## 2024-06-23 PROCEDURE — 25010000002 METRONIDAZOLE 500 MG/100ML SOLUTION: Performed by: INTERNAL MEDICINE

## 2024-06-23 PROCEDURE — 25010000002 CEFEPIME PER 500 MG: Performed by: INTERNAL MEDICINE

## 2024-06-23 PROCEDURE — 25010000002 METRONIDAZOLE 500 MG/100ML SOLUTION: Performed by: SURGERY

## 2024-06-23 PROCEDURE — 99232 SBSQ HOSP IP/OBS MODERATE 35: CPT | Performed by: UROLOGY

## 2024-06-23 PROCEDURE — 97110 THERAPEUTIC EXERCISES: CPT

## 2024-06-23 PROCEDURE — 99232 SBSQ HOSP IP/OBS MODERATE 35: CPT | Performed by: INTERNAL MEDICINE

## 2024-06-23 PROCEDURE — 25010000002 ENOXAPARIN PER 10 MG: Performed by: FAMILY MEDICINE

## 2024-06-23 PROCEDURE — 99222 1ST HOSP IP/OBS MODERATE 55: CPT

## 2024-06-23 PROCEDURE — 85027 COMPLETE CBC AUTOMATED: CPT | Performed by: UROLOGY

## 2024-06-23 PROCEDURE — 99232 SBSQ HOSP IP/OBS MODERATE 35: CPT | Performed by: SURGERY

## 2024-06-23 PROCEDURE — 80048 BASIC METABOLIC PNL TOTAL CA: CPT | Performed by: FAMILY MEDICINE

## 2024-06-23 RX ADMIN — CEFEPIME 2000 MG: 2 INJECTION, POWDER, FOR SOLUTION INTRAVENOUS at 17:15

## 2024-06-23 RX ADMIN — Medication 10 ML: at 20:02

## 2024-06-23 RX ADMIN — METRONIDAZOLE 500 MG: 500 INJECTION, SOLUTION INTRAVENOUS at 10:09

## 2024-06-23 RX ADMIN — FOLIC ACID 1000 MCG: 1 TABLET ORAL at 08:25

## 2024-06-23 RX ADMIN — DULOXETINE HYDROCHLORIDE 60 MG: 30 CAPSULE, DELAYED RELEASE ORAL at 08:26

## 2024-06-23 RX ADMIN — OXYCODONE HYDROCHLORIDE AND ACETAMINOPHEN 500 MG: 500 TABLET ORAL at 08:26

## 2024-06-23 RX ADMIN — ISOSORBIDE MONONITRATE 60 MG: 60 TABLET, EXTENDED RELEASE ORAL at 08:25

## 2024-06-23 RX ADMIN — CEFEPIME 2000 MG: 2 INJECTION, POWDER, FOR SOLUTION INTRAVENOUS at 11:55

## 2024-06-23 RX ADMIN — METHENAMINE HIPPURATE 1 G: 1000 TABLET ORAL at 08:26

## 2024-06-23 RX ADMIN — ONDANSETRON 4 MG: 2 INJECTION INTRAMUSCULAR; INTRAVENOUS at 08:47

## 2024-06-23 RX ADMIN — ONDANSETRON 4 MG: 2 INJECTION INTRAMUSCULAR; INTRAVENOUS at 20:02

## 2024-06-23 RX ADMIN — LEFLUNOMIDE 20 MG: 20 TABLET ORAL at 08:26

## 2024-06-23 RX ADMIN — Medication 1 TABLET: at 08:25

## 2024-06-23 RX ADMIN — Medication 10 ML: at 08:55

## 2024-06-23 RX ADMIN — CEFEPIME 2000 MG: 2 INJECTION, POWDER, FOR SOLUTION INTRAVENOUS at 04:09

## 2024-06-23 RX ADMIN — ENOXAPARIN SODIUM 90 MG: 100 INJECTION SUBCUTANEOUS at 06:11

## 2024-06-23 RX ADMIN — METRONIDAZOLE 500 MG: 500 INJECTION, SOLUTION INTRAVENOUS at 17:02

## 2024-06-23 RX ADMIN — FUROSEMIDE 40 MG: 40 TABLET ORAL at 08:26

## 2024-06-23 RX ADMIN — SPIRONOLACTONE 25 MG: 25 TABLET ORAL at 08:26

## 2024-06-23 RX ADMIN — METRONIDAZOLE 500 MG: 500 INJECTION, SOLUTION INTRAVENOUS at 03:22

## 2024-06-23 RX ADMIN — TRAMADOL HYDROCHLORIDE 50 MG: 50 TABLET ORAL at 20:02

## 2024-06-23 RX ADMIN — METHENAMINE HIPPURATE 1 G: 1000 TABLET ORAL at 20:02

## 2024-06-23 RX ADMIN — ESTRADIOL 2 APPLICATOR: 0.1 CREAM VAGINAL at 08:26

## 2024-06-23 RX ADMIN — CARVEDILOL 25 MG: 25 TABLET, FILM COATED ORAL at 17:11

## 2024-06-23 RX ADMIN — FERROUS SULFATE TAB 325 MG (65 MG ELEMENTAL FE) 325 MG: 325 (65 FE) TAB at 08:26

## 2024-06-23 RX ADMIN — CARVEDILOL 25 MG: 25 TABLET, FILM COATED ORAL at 08:25

## 2024-06-23 RX ADMIN — LEVOTHYROXINE SODIUM 75 MCG: 0.07 TABLET ORAL at 06:11

## 2024-06-23 NOTE — PLAN OF CARE
Goal Outcome Evaluation:  Plan of Care Reviewed With: patient, caregiver        Progress: improving  Outcome Evaluation: Pt A&Ox4. VSS. RA. IV abx given as ordered. Horan care and CHG bath complete. Nausea medication given as needed per pt request. No c/o pain thus far. Caregiver at bedside. Safety maintained. Call light in reach.

## 2024-06-23 NOTE — PROGRESS NOTES
Urology  Length of Stay: 2  Patient Care Team:  Moises Oliveira MD as PCP - General (Internal Medicine)  Moises Holman MD as Consulting Physician (Otolaryngology)  Christiano Rao PA as Physician Assistant (Otolaryngology)  Candelaria David MD as Obstetrician (Obstetrics and Gynecology)  Omar Hernandez MD as Cardiologist (Cardiology)  Leta Crawford APRN as Nurse Practitioner (Nurse Practitioner)  Leta rCawford APRN as Nurse Practitioner (Nurse Practitioner)    Chief Complaint: Recurrent urinary tract infection with obstructing stone    Subjective     Interval History:   She is status post ureteroscopic removal of stone.  Still feels somewhat sluggish.  She is going to get a cholecystectomy done later in the week.  Urine is very clear now on gravity drainage.      Review of Systems:   Review of Systems    Objective       Intake/Output Summary (Last 24 hours) at 6/23/2024 1152  Last data filed at 6/23/2024 0900  Gross per 24 hour   Intake --   Output 600 ml   Net -600 ml       [REMOVED] External Urinary Catheter-Output (mL): 600 mL           Physical Exam:  Temp:  [98.1 °F (36.7 °C)-99.6 °F (37.6 °C)] 98.1 °F (36.7 °C)  Heart Rate:  [83-90] 83  Resp:  [16-20] 18  BP: (134-163)/(50-72) 163/72  Constitutional:   Temp:  [98.1 °F (36.7 °C)-99.6 °F (37.6 °C)] 98.1 °F (36.7 °C)  Heart Rate:  [83-90] 83  Resp:  [16-20] 18  BP: (134-163)/(50-72) 163/72  ] Well developed, well nourished, no distress  Respiratory:   Effort unlabored; Movements symmetric  GI:   No mass or hernia noted, not distended or tender   No enlargement of spleen or liver noted  Skin:   No pallor or cyanosis; No obvious rash  Psych:   Alert, Oriented x 3            Results Review:       I reviewed the patient's new clinical results.  Lab Results (last 24 hours)       Procedure Component Value Units Date/Time    CBC (No Diff) [[id number]]  (Abnormal) Collected: 06/23/24 0428    Specimen: Blood Updated: 06/23/24 0540     WBC 5.03  10*3/mm3      RBC 2.75 10*6/mm3      Hemoglobin 7.7 g/dL      Hematocrit 26.0 %      MCV 94.5 fL      MCH 28.0 pg      MCHC 29.6 g/dL      RDW 18.6 %      RDW-SD 64.7 fl      MPV 10.2 fL      Platelets 170 10*3/mm3     Basic Metabolic Panel [[id number]]  (Normal) Collected: 06/23/24 0428    Specimen: Blood Updated: 06/23/24 0513     Glucose 90 mg/dL      BUN 14 mg/dL      Creatinine 0.86 mg/dL      Sodium 139 mmol/L      Potassium 3.8 mmol/L      Chloride 102 mmol/L      CO2 25.0 mmol/L      Calcium 8.6 mg/dL      BUN/Creatinine Ratio 16.3     Anion Gap 12.0 mmol/L      eGFR 72.8 mL/min/1.73     Narrative:      GFR Normal >60  Chronic Kidney Disease <60  Kidney Failure <15            Imaging Results (Last 24 Hours)       Procedure Component Value Units Date/Time    NM HIDA SCAN WITHOUT PHARMACOLOGICAL INTERVENTION [[id number]] Collected: 06/22/24 1553     Updated: 06/22/24 1559    Narrative:      EXAMINATION: NM HIDA SCAN WITHOUT PHARMACOLOGICAL INTERVENTION-   6/22/2024 3:53 PM     HISTORY: Abdominal pain. Gallstones.     Radiopharmaceutical: Tc99m Hepatolite 5.20 mCi, IV.     Following intravenous injection of 5.2 mCi mebrofenin and multiple  planar images were obtained of the upper abdomen. This demonstrates  normal accumulation of activity within the hepatic parenchyma with  biliary activity identified 15 to 20 minutes post injection. There is no  evidence of common bile duct obstruction with activity emptied from the  common duct into the C-loop of the duodenum and proximal jejunum. There  is no visualization of the gallbladder at 60 minutes. An additional  delayed image of 90 minutes also demonstrates nonvisualization of the  gallbladder.        Impression  1. Nonvisualization of the gallbladder on images obtained up to 90  minutes. Cystic duct obstruction and acute cholecystitis cannot be  excluded on the basis of this exam.  2. No evidence of common bile duct obstruction with emptying of activity  into the  C-loop of the duodenum and proximal jejunum.  3. Gallstones and sludge were demonstrated on recent ultrasound. This  combined with nonvisualization of the gallbladder exclude performance of  an ejection fraction..           This report was signed and finalized on 6/22/2024 3:55 PM by Dr. Neymar Calderon MD.               Medication Review:     Current Facility-Administered Medications:     acetaminophen (TYLENOL) tablet 650 mg, 650 mg, Oral, Q6H PRN, Ky Plascencia MD, 650 mg at 06/19/24 1403    ascorbic acid (VITAMIN C) tablet 500 mg, 500 mg, Oral, Daily, Ky Plascencia MD, 500 mg at 06/23/24 0826    aspirin EC tablet 81 mg, 81 mg, Oral, Daily, Ky Plascencia MD, 81 mg at 06/20/24 0958    sennosides-docusate (PERICOLACE) 8.6-50 MG per tablet 2 tablet, 2 tablet, Oral, BID PRN **AND** polyethylene glycol (MIRALAX) packet 17 g, 17 g, Oral, Daily PRN, 17 g at 06/15/24 1553 **AND** bisacodyl (DULCOLAX) EC tablet 5 mg, 5 mg, Oral, Daily PRN **AND** bisacodyl (DULCOLAX) suppository 10 mg, 10 mg, Rectal, Daily PRN, Ky Plascencia MD    Calcium Replacement - Follow Nurse / BPA Driven Protocol, , Does not apply, PRN, Ky Plascencia MD    carvedilol (COREG) tablet 25 mg, 25 mg, Oral, BID With Meals, Ky Plascencia MD, 25 mg at 06/23/24 0825    cefepime 2000 mg IVPB in 100 mL NS (MBP), 2,000 mg, Intravenous, Q8H, Maggie Bang MD, 2,000 mg at 06/23/24 0409    Diclofenac Sodium (VOLTAREN) 1 % gel 4 g, 4 g, Topical, 4x Daily PRN, Ky Plascencia MD    DULoxetine (CYMBALTA) DR capsule 60 mg, 60 mg, Oral, Daily, Ky Plascencia MD, 60 mg at 06/23/24 0826    estradiol (ESTRACE) vaginal cream 2 applicator, 2 g, Vaginal, Daily, Ky Plascencia MD, 2 applicator at 06/23/24 0826    ferrous sulfate tablet 325 mg, 325 mg, Oral, Daily With Breakfast, Ky Plascencia MD, 325 mg at 06/23/24 0826    folic acid (FOLVITE) tablet 1,000 mcg, 1,000 mcg, Oral, Daily, Ky Plascencia MD, 1,000 mcg at 06/23/24  0825    furosemide (LASIX) tablet 40 mg, 40 mg, Oral, Daily, Ky Plascencia MD, 40 mg at 06/23/24 0826    isosorbide mononitrate (IMDUR) 24 hr tablet 60 mg, 60 mg, Oral, Daily, Ky Plascencia MD, 60 mg at 06/23/24 0825    leflunomide (ARAVA) tablet 20 mg, 20 mg, Oral, Daily, Ky Plascencia MD, 20 mg at 06/23/24 0826    levoFLOXacin (LEVAQUIN) 750 mg/150 mL D5W (premix) (LEVAQUIN) 750 mg, 750 mg, Intravenous, Q24H, Saurabh Jo MD, Last Rate: 100 mL/hr at 06/22/24 2018, 750 mg at 06/22/24 2018    levothyroxine (SYNTHROID, LEVOTHROID) tablet 75 mcg, 75 mcg, Oral, Q AM, Ky Plascencia MD, 75 mcg at 06/23/24 0611    Lidocaine 4 % 1 patch, 1 patch, Transdermal, Daily PRN, Ky Plascencia MD    losartan (COZAAR) tablet 50 mg, 50 mg, Oral, Daily, Ky Plascencia MD, 50 mg at 06/20/24 0958    Magnesium Low Dose Replacement - Follow Nurse / BPA Driven Protocol, , Does not apply, PRN, Ky Plascencia MD    methenamine (HIPREX) tablet 1 g, 1 g, Oral, BID, Ky Plascencia MD, 1 g at 06/23/24 0826    metroNIDAZOLE (FLAGYL) IVPB 500 mg, 500 mg, Intravenous, Q8H, Saurabh Jo MD, Last Rate: 200 mL/hr at 06/23/24 1009, 500 mg at 06/23/24 1009    multivitamin with minerals 1 tablet, 1 tablet, Oral, Daily, Ky Plascencia MD, 1 tablet at 06/23/24 0825    [DISCONTINUED] Morphine sulfate (PF) injection 1 mg, 1 mg, Intravenous, Q4H PRN **AND** naloxone (NARCAN) injection 0.4 mg, 0.4 mg, Intravenous, Q5 Min PRN, Ky Plascencia MD    nitroglycerin (NITROSTAT) SL tablet 0.4 mg, 0.4 mg, Sublingual, Q5 Min PRN, Ky Plascencia MD    ondansetron (ZOFRAN) injection 4 mg, 4 mg, Intravenous, Q6H PRN, Ky Plascencia MD, 4 mg at 06/23/24 0847    pantoprazole (PROTONIX) EC tablet 40 mg, 40 mg, Oral, Daily, Ky Plascencia MD, 40 mg at 06/20/24 0958    Pharmacy to Dose LevoFLOXacin (LEVAQUIN), , Does not apply, Continuous PRN, Saurabh Jo MD    Phosphorus Replacement - Follow Nurse / BPA Driven Protocol,  , Does not apply, Temo PALACIO Donald L, MD    Potassium Replacement - Follow Nurse / BPA Driven Protocol, , Does not apply, Temo PALACIO Donald L, MD    sodium chloride 0.9 % flush 10 mL, 10 mL, Intravenous, PRNTemo Donald L, MD, 10 mL at 06/12/24 2200    sodium chloride 0.9 % flush 10 mL, 10 mL, Intravenous, Q12H, Ky Plascencia MD, 10 mL at 06/23/24 0855    sodium chloride 0.9 % flush 10 mL, 10 mL, Intravenous, PRNTemo Donald L, MD    sodium chloride 0.9 % infusion 40 mL, 40 mL, Intravenous, PRNTemo Donald L, MD    spironolactone (ALDACTONE) tablet 25 mg, 25 mg, Oral, Daily, Ky Plascencia MD, 25 mg at 06/23/24 0826    traMADol (ULTRAM) tablet 50 mg, 50 mg, Oral, Q12H PRN, Ky Plascencia MD, 50 mg at 06/20/24 2012    Assessment/Plan:   Left mid ureteral calculus with obstruction removed ureteroscopically Friday. Ok to remove stent mid week.      Neurogenic bladder with worsening incontinence and at times failure to empty.  Complex voiding picture. Will see if any changes with stent out.    Recurrent urinary tract infection-could possibly be related to stone/obstruction. Reassess now that stone removed.     I would remove her catheter after she gallbladder removed. Maximum drainage after stone removal with stent and cathter recommended a couple more days.       (Please note that portions of this note were completed with a voice recognition program.)  Ky Plascencia MD  06/23/24  11:52 CDT

## 2024-06-23 NOTE — PLAN OF CARE
Goal Outcome Evaluation:  Plan of Care Reviewed With: patient           Outcome Evaluation: Bed mobility mod A-maxA. Sitting EOB SBA with assist to get in good position. Pt stated she needed to have BM while sitting EOB and assist pt back in bed. Rolled Pt mod A and pt had already had BM. Clean pt a little and notified nsg that pt still felt like she needed to continue to have BM. Pt has decrease in strength and endurance.

## 2024-06-23 NOTE — PROGRESS NOTES
[facility] Medicine Services  INPATIENT PROGRESS NOTE    Patient Name: Tawny Shin  Date of Admission: 6/12/2024  Today's Date: 06/23/24  Length of Stay: 2  Primary Care Physician: Moises Oliveira MD    Subjective   Chief Complaint: Recurrent UTI, gallstone, kidney stone   HPI   Blood pressure slightly high, afebrile.  Patient is confused this morning, will get ammonia level in morning.  Patient is on room air.  Patient denies any abdomen pain.  Slight decrease in hemoglobin.  White blood cells normal.    Review of Systems   Constitutional:  Positive for activity change, appetite change and fatigue. Negative for chills and fever.   HENT:  Negative for hearing loss, nosebleeds, tinnitus and trouble swallowing.    Eyes:  Negative for visual disturbance.   Respiratory:  Negative for cough, chest tightness, shortness of breath and wheezing.    Cardiovascular:  Negative for chest pain, palpitations and leg swelling.   Gastrointestinal:  Negative for abdominal distention, abdominal pain, blood in stool, constipation, diarrhea, nausea and vomiting.   Endocrine: Negative for cold intolerance, heat intolerance, polydipsia, polyphagia and polyuria.   Genitourinary:  Negative for decreased urine volume, difficulty urinating, dysuria, flank pain, frequency and hematuria.   Musculoskeletal:  Positive for arthralgias, gait problem and myalgias. Negative for joint swelling.   Skin:  Negative for rash.   Allergic/Immunologic: Negative for immunocompromised state.   Neurological:  Positive for weakness. Negative for dizziness, syncope, light-headedness and headaches.   Hematological:  Negative for adenopathy. Does not bruise/bleed easily.   All pertinent negatives and positives are as above. All other systems have been reviewed and are negative unless otherwise stated.     Objective    Temp:  [98.1 °F (36.7 °C)-99.6 °F (37.6 °C)] 98.1 °F (36.7 °C)  Heart Rate:  [83-90] 83  Resp:  [16-20]  18  BP: (134-163)/(50-72) 163/72  Physical Exam  Vitals and nursing note reviewed.   Constitutional:       Comments: Chronically ill.  Deconditioning.   HENT:      Head: Normocephalic.   Eyes:      Conjunctiva/sclera: Conjunctivae normal.      Pupils: Pupils are equal, round, and reactive to light.   Cardiovascular:      Rate and Rhythm: Normal rate and regular rhythm.      Heart sounds: Normal heart sounds.   Pulmonary:      Effort: Pulmonary effort is normal. No respiratory distress.      Breath sounds: Normal breath sounds.   Abdominal:      General: Bowel sounds are normal. There is no distension.      Palpations: Abdomen is soft.      Comments: Obesity.  Slight right upper quadrant tenderness with palpation.    Musculoskeletal:         General: No swelling.      Cervical back: Neck supple.   Skin:     General: Skin is warm and dry.      Capillary Refill: Capillary refill takes 2 to 3 seconds.      Findings: No rash.   Neurological:      Mental Status: She is alert and oriented to person, place, and time.      Motor: Weakness present.      Coordination: Coordination abnormal.      Gait: Gait abnormal.   Psychiatric:         Mood and Affect: Mood normal.         Behavior: Behavior normal.         Thought Content: Thought content normal.          Results Review:  I have reviewed the labs, radiology results, and diagnostic studies.    Laboratory Data:   Results from last 7 days   Lab Units 06/23/24  0428 06/22/24 0453 06/21/24 0428   WBC 10*3/mm3 5.03 6.21 6.22   HEMOGLOBIN g/dL 7.7* 8.1* 7.8*   HEMATOCRIT % 26.0* 27.2* 25.4*   PLATELETS 10*3/mm3 170 185 200        Results from last 7 days   Lab Units 06/23/24  0428 06/22/24  0453 06/21/24 0428   SODIUM mmol/L 139 138 137   POTASSIUM mmol/L 3.8 4.2 3.8   CHLORIDE mmol/L 102 103 101   CO2 mmol/L 25.0 28.0 29.0   BUN mg/dL 14 16 18   CREATININE mg/dL 0.86 0.93 1.01*   CALCIUM mg/dL 8.6 9.1 9.0   BILIRUBIN mg/dL  --  0.4  --    ALK PHOS U/L  --  81  --    ALT  (SGPT) U/L  --  18  --    AST (SGOT) U/L  --  19  --    GLUCOSE mg/dL 90 99 98       Culture Data:   Urine Culture   Date Value Ref Range Status   06/21/2024 No growth  Final   06/16/2024 25,000 CFU/mL Enterobacter cloacae complex CRE (A)  Final     Comment:       Consider infectious disease consult.  Susceptibility results may not correlate to clinical outcomes.  Carbapenem resistant Enterobacteriaceae, patient may be an isolation risk.  No carbapenemase detected.       Radiology Data:   Imaging Results (Last 24 Hours)       Procedure Component Value Units Date/Time    NM HIDA SCAN WITHOUT PHARMACOLOGICAL INTERVENTION [[id number]] Collected: 06/22/24 1553     Updated: 06/22/24 1559    Narrative:      EXAMINATION: NM HIDA SCAN WITHOUT PHARMACOLOGICAL INTERVENTION-   6/22/2024 3:53 PM     HISTORY: Abdominal pain. Gallstones.     Radiopharmaceutical: Tc99m Hepatolite 5.20 mCi, IV.     Following intravenous injection of 5.2 mCi mebrofenin and multiple  planar images were obtained of the upper abdomen. This demonstrates  normal accumulation of activity within the hepatic parenchyma with  biliary activity identified 15 to 20 minutes post injection. There is no  evidence of common bile duct obstruction with activity emptied from the  common duct into the C-loop of the duodenum and proximal jejunum. There  is no visualization of the gallbladder at 60 minutes. An additional  delayed image of 90 minutes also demonstrates nonvisualization of the  gallbladder.        Impression  1. Nonvisualization of the gallbladder on images obtained up to 90  minutes. Cystic duct obstruction and acute cholecystitis cannot be  excluded on the basis of this exam.  2. No evidence of common bile duct obstruction with emptying of activity  into the C-loop of the duodenum and proximal jejunum.  3. Gallstones and sludge were demonstrated on recent ultrasound. This  combined with nonvisualization of the gallbladder exclude performance of  an  ejection fraction..           This report was signed and finalized on 6/22/2024 3:55 PM by Dr. Neymar Calderon MD.       US Gallbladder [[id number]] Collected: 06/22/24 1037     Updated: 06/22/24 1043    Narrative:      US GALLBLADDER-     HISTORY: Quadrant pain, gallstones     COMPARISON: CT abdomen pelvis 6/20/2024     FINDINGS: Sonographic imaging of the right upper quadrant is performed.     Images of the pancreas are unremarkable. The proximal and mid abdominal  aorta are normal in caliber. Distal aorta is obscured by overlying  shadowing bowel gas. Proximal IVC is patent.     No focal liver mass is identified. Appropriate directional flow within  the main portal vein. Gallbladder is moderately distended containing  small shadowing stones and sludge within the region of the gallbladder  neck. No gallbladder wall thickening or pericholecystic fluid is  visualized. Common bile duct is normal in caliber measuring 4 mm.     Right kidney appears normal measuring 10.9 cm in length.       Impression:      1. Cholelithiasis with small shadowing stones and sludge within the  region of the gallbladder neck. Moderate distention of the gallbladder,  however no gallbladder wall thickening or pericholecystic fluid  identified. No biliary dilatation.     This report was signed and finalized on 6/22/2024 10:39 AM by Dr. Trinity Gómez MD.               I have reviewed the patient's current medications.     Assessment/Plan   Assessment  Active Hospital Problems    Diagnosis     **Altered mental status     Esophageal dysphagia     UTI (urinary tract infection)     Hydronephrosis with urinary obstruction due to ureteral calculus     Hypothyroidism     Stage 3b chronic kidney disease     Near functional paraplegia     Cholelithiasis     Essential hypertension     Sick sinus syndrome     Coronary artery disease involving native coronary artery of native heart without angina pectoris     Venous insufficiency     Rheumatoid  arthritis involving multiple sites     Chronic heart failure with preserved ejection fraction     Class 1 obesity due to excess calories with serious comorbidity and body mass index (BMI) of 33.0 to 33.9 in adult     Functional neurological symptom disorder with weakness or paralysis     Chronic anticoagulation     Chronic embolism and thrombosis of unspecified deep veins of left lower extremity        Treatment Plan  Recurrent UTI/neurogenic bladder.  Cefepime antibiotics.  Continue Hip-Stef.  Repeat UA, negative for bacteria.  Consult ID.  Urology consult.  CT scan abdomen pelvic . Obstructive uropathy on the left with mild to moderate dilatation of the upper tracts of the left kidney and left ureter into the true pelvis where there is a 5 x 6 mm calculus within the left ureter approximately 4 to 5 cm above the UVJ- right ureter is decompressed and normal in appearance- No evidence of renal mass. No perinephric fluid collection present, gallbladder is mildly distended- gallstones within the gallbladder neck,  No pericholecystic inflammatory changes or biliary dilatation, Constipation,  No obstruction or free air, Normal appendix, Calcified fibroid within the lower uterine segment, Mild constipation, No obstruction or free air, Normal appendix, Small fat-containing periumbilical hernia, Previous kyphoplasty at T12, Previous fusion at L3-L4 and L4-L5, Left basilar atelectasis..  Recommendation from urology-evaluation of neurogenic bladder at a university setting like [place].     Obstructing renal calculi left side/moderate dilated of left ureter.  Callus 5 x 6 mm and left 4 to 5 cm above the UVJ junction.  Status post 6/21/2024 urethroscope laser lithotripsy with stent insertion left     Gallstone, within the gallbladder neck/dysfunctional gallbladder.  Mild distended gallbladder.  Right upper quadrant tenderness.  Ultrasound of the gallbladder-Cholelithiasis with small shadowing stones and sludge within the  region of the gallbladder neck- Moderate distention of the gallbladder- however no gallbladder wall thickening or pericholecystic fluid identified- No biliary dilatation.  HIDA scan-Nonvisualization of the gallbladder on images obtained up to 90 minutes- Cystic duct obstruction and acute cholecystitis cannot be excluded on the basis of this exam,  No evidence of common bile duct obstruction with emptying of activity into the C-loop of the duodenum and proximal jejunum, Gallstones and sludge were demonstrated on recent ultrasound- combined with nonvisualization of the gallbladder exclude performance of an ejection fraction..  Discussed with general surgery Dr. Jo-plan for gallbladder removal this coming Tuesday, DC Eliquis today 6/22/2024 start Lovenox today, nursing communication to stop Lovenox 24 hours before surgery on Monday.  Dr. Jo also recommended cardiac clearance for surgery.  I put in cardiac consult today.  Cardiology states okay to DC Lovenox today per Dr. Jo.  Will DC Lovenox today.     Reflux/esophageal dysphagia. Dysphagia recent esophageal stretching, GI evaluated and recommended to continue outpatient follow-up.  SLP evaluated and recommended to continue regular diet.  Protonix . Zofran as needed.     Altered mental status/encephalopathy.  CT scan the head-No acute intracranial abnormality, Chronic sinusitis and left mastoid effusion.  Chest x-ray-Stable chest exam without acute process, No visualized infiltrate.   Patient is on room air.     Hypothyroidism.  Synthroid.     Chronic stage IIIb renal failure.  Creatinine normalized.     Hypomagnesia. Resolved.     Hyponatremia.  Resolved.     Hypokalemia.  Resolved.  Magnesium normal.     Hypertension/sick sinus syndrome/venous insufficiency/CHF.  Eliquis.  Aspirin . Coreg . Lasix.  Imdur.  Cozaar . Aldactone.  Nitro as needed.     Right gluteal wound.  Consult wound care.     Rheumatoid arthritis. Arava.     Depression/anxiety.   Cymbalta.     Postmenopausal.  Estradiol cream.     Anemia.  Hemoglobin decreased.  Iron sulfate.  Folic acid.  No sign of acute bleed.     Pain . lidocaine patch.  Voltaren gel.  Ultram as needed.     Chronic DVT.  DC Eliquis due to possible surgery, on Lovenox.    Functional neurological symptom disorder with paraplegia.     Nutrition.  Cardiac diet.  Vitamin C.  Multivitamins.  Regular/house diet.  Boost supplement.     Deconditioning.  PT and OT consult.  Patient use a walker and bedbound at baseline.     Urine culture-100,000 CFU/mL Enterobacter cloacae complex  25,000 CFU/mL Enterobacter cloacae complex CRE   Blood culture-no growth for 5 days.     Dr. Shin's mom.  Patient already have home health at home.  Patient request for lift at discharge.     Medical Decision Making  Number and Complexity of problems: Recurrent UTI/altered mental status/reflux/rheumatoid arthritis/chronic 3B renal failure  Differential Diagnosis: None     Conditions and Status        Condition is unchanged.     MDM Data  External documents reviewed: Previous note .  Cardiac tracing (EKG, telemetry) interpretation: Sinus.  Radiology interpretation: X-ray/CT scan of the head  Labs reviewed: Laboratory  Any tests that were considered but not ordered: Lab in a.m.     Decision rules/scores evaluated (example [id number], [place], etc): None     Discussed with: Patient and family     Care Planning  Shared decision making: Patient and family  Code status and discussions: DNR     Disposition  Social Determinants of Health that impact treatment or disposition: From home  1 to 3 days.    Electronically signed by Saulo Ambriz MD, 06/23/24, 09:40 CDT.

## 2024-06-23 NOTE — THERAPY TREATMENT NOTE
Acute Care - Physical Therapy Treatment Note  [facility]     Patient Name: Tawny Shin  : 1955  MRN: [id number]  Today's Date: 2024      Visit Dx:     ICD-10-CM ICD-9-CM   1. Acute UTI (urinary tract infection)  N39.0 599.0   2. Altered mental status, unspecified altered mental status type  R41.82 780.97   3. Open wound of right buttock, sequela  S31.819S 906.0   4. Cyst of buttocks  L72.9 706.2   5. Dysphagia, unspecified type  R13.10 787.20   6. Impaired mobility [Z74.09]  Z74.09 799.89   7. Hydronephrosis with urinary obstruction due to ureteral calculus  N13.2 592.1     591     Patient Active Problem List   Diagnosis    T12 compression fracture, initial encounter    Chronic embolism and thrombosis of unspecified deep veins of left lower extremity    Chronic anticoagulation    Iron deficiency anemia    Osteoporosis    E coli bacteremia    Epidural hematoma    Pleural effusion, left    Functional neurological symptom disorder with weakness or paralysis    Class 1 obesity due to excess calories with serious comorbidity and body mass index (BMI) of 33.0 to 33.9 in adult    Rheumatoid arthritis involving multiple sites    Chronic heart failure with preserved ejection fraction    Venous insufficiency    Coronary artery disease involving native coronary artery of native heart without angina pectoris    Sick sinus syndrome    Essential hypertension    Pressure injury of skin of heel    Chronic pain    Anemia of chronic disease    Cholelithiasis    Pressure injury of skin of buttock    Near functional paraplegia    Skin cancer    Squamous cell carcinoma of back    Hematoma    Right-sided chest wall pain    Hyperlipidemia LDL goal <70    Malodorous urine    Stage 3b chronic kidney disease    Cyst of buttocks    Sepsis due to Escherichia coli without acute organ dysfunction    Generalized weakness    Paralysis    History of urinary retention    Bacteremia due to Enterobacter species    Bacteremia     "Hypothyroidism    Abnormal CT of the abdomen    Presence of cardiac pacemaker    Altered mental status    UTI (urinary tract infection)    Esophageal dysphagia    Hydronephrosis with urinary obstruction due to ureteral calculus     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    Elevated cholesterol     Eustachian tube dysfunction     Heart disease     Herpes simplex     History of transfusion     Hyperlipidemia     Hypertension     Hypothyroidism 12/23/2020    Intrinsic asthma 12/23/2020    Knee dislocation     Labral tear of right hip joint     Laryngitis sicca     Laryngitis, chronic     Left carotid bruit 03/09/2016    MI (myocardial infarction)     Myalgia due to statin 06/25/2019    Open wound of right hip 09/14/2021    Osteomyelitis of right femur 07/06/2021    Otorrhea     Pacemaker 11/17/2016    Primary osteoarthritis of left knee 12/23/2020    Psoriasis vulgaris 12/23/2020    S/P coronary artery stent placement 03/09/2016    Sensorineural hearing loss     Seropositive rheumatoid arthritis of multiple sites 12/27/2019    Overview:  -myochrysine '93-'96 -methotrexate '96--->11/98;r/s  restarted 2/99--> 8/14 (anemia) -sulfasalazine- not effective -penicillamine 6/98-->10/98; no effect -leflunomide 11/98--> - Humira '13-->didn't take - Enbrel 12/14-->3/15- no effect!   Last Assessment & Plan:  - \"aching all over\" because she had to be off her anti-rheumatic drugs for 2 weeks in preparation for " her R knee surgery - he    Sick sinus syndrome 12/27/2019    Sjogren's disease     Spondylolisthesis of lumbar region 01/17/2018    Syncope, recurrent 02/08/2021    Urinary tract infection     UTI (urinary tract infection) 04/19/2024     Past Surgical History:   Procedure Laterality Date    A-V CARDIAC PACEMAKER INSERTION  2016    ATRIAL CARDIAC PACEMAKER INSERTION      CARDIAC CATHETERIZATION      CATARACT EXTRACTION      CERVICAL CORPECTOMY N/A 3/3/2021    Procedure: CERVICAL 6 CORPECTOMY WITH TITANIUM CAGE WITH NEURO MONITORING;  Surgeon: Bandar Shea MD;  Location:  [facility];  Service: Neurosurgery;  Laterality: N/A;    COLONOSCOPY  11/08/2011    One fold in the ascending colon which showed ulcer otherwise normal exam    COLONOSCOPY  11/12/2004    Normal exam repeat in five years    CORONARY ANGIOPLASTY WITH STENT PLACEMENT      X 2; 2013 & 2014    ENDOSCOPY  07/10/2014    Normal exam    ENDOSCOPY N/A 5/30/2024    Procedure: ESOPHAGOGASTRODUODENOSCOPY WITH ANESTHESIA;  Surgeon: Taiwo Sanchez MD;  Location:  [facility];  Service: Gastroenterology;  Laterality: N/A;  preop; dysphagia  postop: balloon dilation  PCP Jesus Manuel jeff    FLAP LEG Right 9/14/2021    Procedure: RIGHT GLUTEAL FASCIOCUTANEOUS ADVANCEMENT FLAP AND RIGHT TENSOR FASCIAL JESSICA FLAP;  Surgeon: Amadeo Turner MD;  Location:  [facility];  Service: Plastics;  Laterality: Right;    HIP ABDUCTION TENOTOMY BILATERAL Right 1/14/2021    Procedure: RIGHT HIP GLUTEUS MEDLUS / MINIMUS REPAIR, POSSIBLE ACHILLES ALLOGRAFT;  Surgeon: Nino Carlson MD;  Location:  [facility];  Service: Orthopedics;  Laterality: Right;    INCISION AND DRAINAGE ABSCESS Right 6/4/2022    Procedure: INCISION AND DRAINAGE ABSCESS right hip;  Surgeon: Magda Salcido MD;  Location:  [facility];  Service: General;  Laterality: Right;    INCISION AND DRAINAGE ABSCESS Right 6/10/2022    Procedure: RIGHT HIP INCISION AND DRAINAGE. MD NEEDS 3L VANC IRRIGATION, EDNA,  JOHN STARKS;  Surgeon: Amadeo Turner MD;  Location:  [facility];  Service: Plastics;  Laterality: Right;    INCISION AND DRAINAGE HIP Right 2/9/2021    Procedure: HIP INCISION AND DRAINAGE;  Surgeon: Nino Carlson MD;  Location:  [facility];  Service: Orthopedics;  Laterality: Right;    INCISION AND DRAINAGE LEG Right 10/24/2021    Procedure: INCISION AND DRAINAGE LOWER EXTREMITY;  Surgeon: Amadeo Turner MD;  Location:  [facility];  Service: Plastics;  Laterality: Right;    INCISION AND DRAINAGE OF WOUND Right 7/8/2021    Procedure: INCISION AND DRAINAGE WOUND RIGHT HIP;  Surgeon: James Huntley MD;  Location:  [facility];  Service: Orthopedics;  Laterality: Right;    JOINT REPLACEMENT      KYPHOPLASTY WITH BIOPSY Bilateral 10/26/2021    Procedure: THOARCIC 12 KYPHOPLASTY WITH BIOPSY;  Surgeon: Bandar Shea MD;  Location:  [facility];  Service: Neurosurgery;  Laterality: Bilateral;    LEG DEBRIDEMENT Right 9/14/2021    Procedure: DEBRIDEMENT OF RIGHT HIP WOUND, RIGHT GLUTEAL FASCIOCUTANEOUS ADVANCEMENT FLAP AND RIGHT TENSOR FASCIAL JESSICA FLAP;  Surgeon: Amadeo Turner MD;  Location:  [facility];  Service: Plastics;  Laterality: Right;    LUMBAR DISCECTOMY Right 3/23/2021    Procedure: LUMBAR DISCECTOMY MICRO, Lumbar 1/2 right;  Surgeon: Bandar Shea MD;  Location:  [facility];  Service: Neurosurgery;  Laterality: Right;    LUMBAR FUSION N/A 1/19/2018    Procedure: L3-4,L4-5 DECOMPRESSION, POSTERIOR SPINAL FUSION WITH INSTRUMENTATION;  Surgeon: Fortino Oropeza MD;  Location:  [facility];  Service:     LUMBAR LAMINECTOMY WITH FUSION Left 1/17/2018    Procedure: LEFT L3-4 L4-5 LATERAL LUMBAR INTERBODY FUSION;  Surgeon: Fortino Oropeza MD;  Location:  [facility];  Service:     MASS EXCISION Right 4/23/2024    Procedure: RIGHT BUTTOCK MASS EXCISION;  Surgeon: Moises Keyes MD;  Location:  [facility];  Service: General;  Laterality: Right;    MYRINGOTOMY W/ TUBES  09/04/2014     TUBES NO LONGER IN PLACE    OTHER SURGICAL HISTORY      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 so hardware was removed and spacers were placed    REPLACEMENT TOTAL KNEE Right      PT Assessment (Last 12 Hours)       PT Evaluation and Treatment       Row Name 06/23/24 1018          Physical Therapy Time and Intention    Subjective Information complains of;weakness;fatigue  -WK     Document Type therapy note (daily note)  -WK     Mode of Treatment physical therapy  -WK       Row Name 06/23/24 1018          General Information    Existing Precautions/Restrictions fall  -WK       [facility] Name 06/23/24 1018          Pain    Pretreatment Pain Rating 0/10 - no pain  -WK     Posttreatment Pain Rating 0/10 - no pain  -WK       [facility] Name 06/23/24 1018          Bed Mobility    Rolling Left [place] (Bed Mobility) verbal cues;minimum assist (75% patient effort)  -WK     Scooting/Bridging [place] (Bed Mobility) maximum assist (25% patient effort);verbal cues  -WK     Supine-Sit [place] (Bed Mobility) verbal cues;moderate assist (50% patient effort)  -WK     Sit-Supine [place] (Bed Mobility) verbal cues;moderate assist (50% patient effort);maximum assist (25% patient effort)  -WK       Row Name 06/23/24 1018          Balance    Static Sitting Balance standby assist  -WK     Dynamic Sitting Balance standby assist  -WK       Row Name 06/23/24 1018          Motor Skills    Comments, Therapeutic Exercise BLE AAROM 20 reps fowlers  -WK       Row Name             Wound 04/23/24 0936 Right gluteal Incision    Wound - Properties Group Placement Date: 04/23/24  -EP Placement Time: 0936  -EP Present on Original Admission: N  -EP Side: Right  -EP Location: gluteal  -EP Primary Wound Type: Incision  -EP    Retired Wound - Properties Group Placement Date: 04/23/24  -EP Placement Time: 0936  -EP Present on Original Admission: N  -EP Side: Right  -EP Location: gluteal  -EP Primary Wound Type: Incision  -EP    Retired Wound - Properties  Group Date first assessed: 04/23/24  -EP Time first assessed: 0936  -EP Present on Original Admission: N  -EP Side: Right  -EP Location: gluteal  -EP Primary Wound Type: Incision  -EP      Row Name 06/23/24 1018          Plan of Care Review    Plan of Care Reviewed With patient  -WK     Outcome Evaluation Bed mobility mod A-maxA. Sitting EOB SBA with assist to get in good position. Pt stated she needed to have BM while sitting EOB and assist pt back in bed. Rolled Pt mod A and pt had already had BM. Clean pt a little and notified nsg that pt still felt like she needed to continue to have BM. Pt has decrease in strength and endurance.  -WK       Row Name 06/23/24 1018          Positioning and Restraints    Pre-Treatment Position in bed  -WK     Post Treatment Position bed  -WK     In Bed side lying right;notified nsg;call light within reach;encouraged to call for assist;with family/caregiver  -WK               User Key  (r) = Recorded By, (t) = Taken By, (c) = Cosigned By      Initials Name Provider Type    WK Kristy Hebert PTA Physical Therapist Assistant    EP Sonny Rivero RN Registered Nurse                    Physical Therapy Education       Title: PT OT SLP Therapies (Done)       Topic: Physical Therapy (Done)       Point: Mobility training (Done)       Learning Progress Summary             Patient Acceptance, E, VU by KS at 6/20/2024 1715    Acceptance, E, VU by MS at 6/18/2024 1502    Comment: role of PT in her care                         Point: Home exercise program (Done)       Learning Progress Summary             Patient Acceptance, E, VU by KS at 6/20/2024 1715    Acceptance, E,D,TB, VU,DU,NR by KH at 6/15/2024 1514    Acceptance, E,TB,D, VU,DU,NR by KH at 6/14/2024 1550                         Point: Body mechanics (Done)       Learning Progress Summary             Patient Acceptance, E, VU by KS at 6/20/2024 1715    Acceptance, E,D,TB, VU,DU,NR by KH at 6/15/2024 1514    Acceptance, E,TB,D,  VU,DU,NR by  at 6/14/2024 1550                         Point: Precautions (Done)       Learning Progress Summary             Patient Acceptance, E, VU by KS at 6/20/2024 1715    Acceptance, E,D,TB, VU,DU,NR by  at 6/15/2024 1514    Acceptance, E,TB,D, VU,DU,NR by  at 6/14/2024 1550                                         User Key       Initials Effective Dates Name Provider Type Discipline    MS 07/11/23 -  Nicole Olson, PT, DPT, NCS Physical Therapist PT    KS 02/27/23 -  Ewelina Morales, RN Registered Nurse Nurse     10/17/23 -  Stephanie Doll, RN Registered Nurse Nurse                  PT Recommendation and Plan     Plan of Care Reviewed With: patient  Outcome Evaluation: Bed mobility mod A-maxA. Sitting EOB SBA with assist to get in good position. Pt stated she needed to have BM while sitting EOB and assist pt back in bed. Rolled Pt mod A and pt had already had BM. Clean pt a little and notified nsg that pt still felt like she needed to continue to have BM. Pt has decrease in strength and endurance.   Outcome Measures       Row Name 06/20/24 1500             How much help from another is currently needed...    Putting on and taking off regular lower body clothing? 1  -EC      Bathing (including washing, rinsing, and drying) 2  -EC      Toileting (which includes using toilet bed pan or urinal) 2  -EC      Putting on and taking off regular upper body clothing 3  -EC      Taking care of personal grooming (such as brushing teeth) 3  -EC      Eating meals 4  -EC      AM-PAC 6 Clicks Score (OT) 15  -EC         Functional Assessment    Outcome Measure Options AM-PAC 6 Clicks Daily Activity (OT)  -EC                User Key  (r) = Recorded By, (t) = Taken By, (c) = Cosigned By      Initials Name Provider Type    EC Ashley Martino, OTR/L Occupational Therapist                     Time Calculation:    PT Charges       Row Name 06/23/24 1154             Time Calculation    Start Time 1018  -WK      Stop Time  1057  -WK      Time Calculation (min) 39 min  -WK      PT Received On 06/23/24  -WK         Time Calculation- PT    Total Timed Code Minutes- PT 39 minute(s)  -WK         Timed Charges    01178 - PT Therapeutic Exercise Minutes 10  -WK      35626 - PT Therapeutic Activity Minutes 29  -WK         Total Minutes    Timed Charges Total Minutes 39  -WK       Total Minutes 39  -WK                User Key  (r) = Recorded By, (t) = Taken By, (c) = Cosigned By      Initials Name Provider Type    WK Kristy Hebert PTA Physical Therapist Assistant                  Therapy Charges for Today       Code Description Service Date Service Provider Modifiers Qty    64088212070 HC PT THER PROC EA 15 MIN 6/23/2024 Kristy Hebert PTA GP 1    13656885775 HC PT THERAPEUTIC ACT EA 15 MIN 6/23/2024 Kristy Hebert PTA GP 2            PT G-Codes  Outcome Measure Options: AM-PAC 6 Clicks Daily Activity (OT)  AM-PAC 6 Clicks Score (PT): 12  AM-PAC 6 Clicks Score (OT): 15    Kristy Hebert PTA  6/23/2024

## 2024-06-23 NOTE — PROGRESS NOTES
[facility]   Progress Note    Patient Name: Tawny Shin  : 1955  MRN: [id number]  Primary Care Physician:  Moises Oliveira MD  Date of admission: 2024    Subjective   Subjective   No abdominal pain. +Nausea overnight.         Objective     Vitals:   Temp:  [98.1 °F (36.7 °C)-99.6 °F (37.6 °C)] 98.1 °F (36.7 °C)  Heart Rate:  [83-90] 83  Resp:  [16-20] 18  BP: (134-163)/(50-72) 163/72  Physical Exam    Constitutional: Awake, alert   Gastrointestinal: soft, RUQ TTP, ND   Skin: No rashes     Result Review    Result Review:  I have personally reviewed the results from the time of this admission to 2024 10:03 CDT and agree with these findings:  [x]  Laboratory list / accordion  []  Microbiology  [x]  Radiology  []  EKG/Telemetry   []  Cardiology/Vascular   []  Pathology  []  Old records  []  Other:    Assessment & Plan   Assessment / Plan     Brief Patient Summary:  Tawny Shin is a 68 y.o. female who presents with acute cholecystitis    Active Hospital Problems:  Active Hospital Problems    Diagnosis     **Altered mental status     Esophageal dysphagia     UTI (urinary tract infection)     Hydronephrosis with urinary obstruction due to ureteral calculus     Hypothyroidism     Stage 3b chronic kidney disease     Near functional paraplegia     Cholelithiasis     Essential hypertension     Sick sinus syndrome     Coronary artery disease involving native coronary artery of native heart without angina pectoris     Venous insufficiency     Rheumatoid arthritis involving multiple sites     Chronic heart failure with preserved ejection fraction     Class 1 obesity due to excess calories with serious comorbidity and body mass index (BMI) of 33.0 to 33.9 in adult     Functional neurological symptom disorder with weakness or paralysis     Chronic anticoagulation     Chronic embolism and thrombosis of unspecified deep veins of left lower extremity      Plan:   Will plan for robotic  cholecystectomy on 6/25  NPO  IVF  IV antibiotics    VTE Prophylaxis:  Pharmacologic VTE prophylaxis orders are present.        CODE STATUS:   Medical Intervention Limits: Other  Code Status (Patient has no pulse and is not breathing): No CPR (Do Not Attempt to Resuscitate)  Medical Interventions (Patient has pulse or is breathing): Limited Support  Additional Medical Interventions Limits: CPR        MD Saurabh Argueta MD  General Surgery  06/23/24  11:49 CDT

## 2024-06-23 NOTE — PROGRESS NOTES
Infectious Diseases Progress Note    Patient:  Tawny Shin  YOB: 1955  MRN: [id number]   Admit date: 6/12/2024   Admitting Physician: Saulo Ambriz MD  Primary Care Physician: Moises Oliveira MD    Chief Complaint/Interval History: She is without fever.  She has caregiver, family, and a friend at bedside.  Her vital signs been stable.  She was alert, smiling, and interactive today.  Interval notes reviewed.  Results of HIDA scan noted.  Plans for cholecystectomy on Tuesday noted.  She is tolerating cefepime without side effect.  Tolerating metronidazole without side effect.    Intake/Output Summary (Last 24 hours) at 6/23/2024 1318  Last data filed at 6/23/2024 1155  Gross per 24 hour   Intake --   Output 600 ml   Net -600 ml     Allergies:   Allergies   Allergen Reactions    Atorvastatin Other (See Comments)     LEG CRAMPS      Amoxicillin Rash    Escitalopram Rash    Nabumetone Rash    Niacin Er Rash    Penicillin G Rash    Penicillins Rash    Simvastatin Rash     Current Scheduled Medications:   ascorbic acid, 500 mg, Oral, Daily  aspirin, 81 mg, Oral, Daily  carvedilol, 25 mg, Oral, BID With Meals  cefepime, 2,000 mg, Intravenous, Q8H  DULoxetine, 60 mg, Oral, Daily  estradiol, 2 g, Vaginal, Daily  ferrous sulfate, 325 mg, Oral, Daily With Breakfast  folic acid, 1,000 mcg, Oral, Daily  furosemide, 40 mg, Oral, Daily  isosorbide mononitrate, 60 mg, Oral, Daily  leflunomide, 20 mg, Oral, Daily  levoFLOXacin, 750 mg, Intravenous, Q24H  levothyroxine, 75 mcg, Oral, Q AM  losartan, 50 mg, Oral, Daily  methenamine, 1 g, Oral, BID  metroNIDAZOLE, 500 mg, Intravenous, Q8H  multivitamin with minerals, 1 tablet, Oral, Daily  pantoprazole, 40 mg, Oral, Daily  sodium chloride, 10 mL, Intravenous, Q12H  spironolactone, 25 mg, Oral, Daily      Pharmacy to Dose LevoFLOXacin (LEVAQUIN),        Current PRN Medications:    acetaminophen    senna-docusate sodium **AND** polyethylene glycol **AND**  "bisacodyl **AND** bisacodyl    Calcium Replacement - Follow Nurse / BPA Driven Protocol    Diclofenac Sodium    Lidocaine    Magnesium Low Dose Replacement - Follow Nurse / BPA Driven Protocol    [DISCONTINUED] Morphine **AND** naloxone    nitroglycerin    ondansetron    Pharmacy to Dose LevoFLOXacin (LEVAQUIN)    Phosphorus Replacement - Follow Nurse / BPA Driven Protocol    Potassium Replacement - Follow Nurse / BPA Driven Protocol    sodium chloride    sodium chloride    sodium chloride    traMADol    Review of Systems     Vital Signs:  Temp (24hrs), Av.6 °F (37 °C), Min:98.1 °F (36.7 °C), Max:99.6 °F (37.6 °C)    /59 (BP Location: Left arm, Patient Position: Lying)   Pulse 88   Temp 98.7 °F (37.1 °C) (Oral)   Resp 18   Ht 167.6 cm (66\")   Wt 94.8 kg (209 lb)   LMP  (LMP Unknown)   SpO2 94%   BMI 33.73 kg/m²     Physical Exam  Vital signs - reviewed.  Line/IV site - No erythema, warmth, induration, or tenderness.    Lab Results:  CBC:   Results from last 7 days   Lab Units 24  0428 24  0453 24  0428 24  0354 24  0122 24  0418   WBC 10*3/mm3 5.03 6.21 6.22 6.32 6.69 6.75   HEMOGLOBIN g/dL 7.7* 8.1* 7.8* 7.5* 7.4* 9.7*   HEMATOCRIT % 26.0* 27.2* 25.4* 25.2* 25.3* 32.4*   PLATELETS 10*3/mm3 170 185 200 192 168 174     BMP:  Results from last 7 days   Lab Units 24  0428 24  0453 24  0428 24  0354 24  0018 24  0418   SODIUM mmol/L 139 138 137 133* 135* 136   POTASSIUM mmol/L 3.8 4.2 3.8 3.4* 3.7 4.1   CHLORIDE mmol/L 102 103 101 97* 101 100   CO2 mmol/L 25.0 28.0 29.0 28.0 26.0 27.0   BUN mg/dL 14 16 18 22 23 23   CREATININE mg/dL 0.86 0.93 1.01* 1.07* 1.16* 1.08*   GLUCOSE mg/dL 90 99 98 97 120* 122*   CALCIUM mg/dL 8.6 9.1 9.0 8.6 8.3* 8.4*   ALT (SGPT) U/L  --  18  --   --   --   --      Culture Results:   Urine Culture   Date Value Ref Range Status   2024 No growth  Final     Urine culture from " 2024:  Susceptibility     Enterobacter cloacae complex CRE     CEPHEID GENEXPERT LICHA     Cefepime  2 ug/ml Susceptible     Ceftazidime  >=64 ug/ml Resistant     Ceftriaxone  >=64 ug/ml Resistant     Gentamicin  <=1 ug/ml Susceptible     Imipenem  1 ug/ml Intermediate     IMP Not Detected       KPC Not Detected       Levofloxacin  >=8 ug/ml Resistant     Meropenem  4 ug/ml Resistant     NDM Not Detected       Nitrofurantoin  256 ug/ml Resistant     OXA48 Not Detected       Piperacillin + Tazobactam  >=128 ug/ml Resistant     Trimethoprim + Sulfamethoxazole  160 ug/ml Resistant     VIM Not Detected                   Radiology:     Impression  1. Nonvisualization of the gallbladder on images obtained up to 90  minutes. Cystic duct obstruction and acute cholecystitis cannot be  excluded on the basis of this exam.  2. No evidence of common bile duct obstruction with emptying of activity  into the C-loop of the duodenum and proximal jejunum.  3. Gallstones and sludge were demonstrated on recent ultrasound. This  combined with nonvisualization of the gallbladder exclude performance of  an ejection fraction..     Additional Studies Reviewed: None    Impression:   1.  She had had obstructing left ureteral stone-underwent stenting.  Stent remains in place.  Urine culture on the 16th had grown a resistant Enterobacter.  Follow-up urine culture on the 21st no growth.  2.  Recurrent recent admissions for gram-negative bloodstream infection.  She has likely had urinary source for most of them.  Gallbladder could have been a source as well.  3.  Nonfunctioning gallbladder per HIDA scan.  Scheduled for cholecystectomy on Tuesday.  4.  Right gluteal wound.    Recommendations:   Continue cefepime  Continue metronidazole  Discontinue levofloxacin  Continue to follow    Elie Ohara MD

## 2024-06-23 NOTE — PLAN OF CARE
Goal Outcome Evaluation:   VSS. Patient more alert this shift. 3 way Horan catheter in place. CBI continued. Urine clear, free from clots. No complaints at this time. Will continue to monitor.                                              Name band;

## 2024-06-23 NOTE — CONSULTS
[facility] CONSULT NOTE    Referring Provider: Wes Andrews MD    Reason for Consultation: Cardiac risk assessment    Chief complaint:   Chief Complaint   Patient presents with    Fever       Subjective .     History of present illness:  Tawny Shin is a 68 y.o. female with known history of coronary artery disease, sick sinus syndrome status post dual-chamber pacemaker, hypertension, hyperlipidemia, heart failure preserved ejection fraction, and prior DVT who cardiology is asked to provide a cardiac risk assessment for possible intra-abdominal procedure.    Patient presented to [facility] 6/12/2024 with altered mental status.  Workup was revealed combination of acute respiratory failure as well as UTI.  Patient in multiple different specialties have seen her including infectious disease, gastroenterology, and most recently general surgery.  General surgery has ordered a HIDA scan to rule out acute cholecystitis and they have asked us to provide cardiac risk assessment in case patient needs to undergo cholecystectomy.    Upon examination the patient is sitting up in bed.  Patient tells me that she is nauseous and not feeling well.  She appears to be slightly confused.  When asked about cardiac symptoms she denies and for the most part.  She does deny that she has some chest pain, upon further asking she describes it as more of a GI pain that occurs worse after eating.  She denies any exertional symptoms (patient is in electric wheelchair at baseline and therefore does not exert herself too much) and denies any shortness of breath as of late.    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    Elevated cholesterol     Eustachian tube dysfunction     Heart disease     Herpes simplex     History of transfusion     Hyperlipidemia     Hypertension     Hypothyroidism 12/23/2020    Intrinsic asthma 12/23/2020    Knee dislocation     Labral tear of right hip joint     Laryngitis sicca     Laryngitis, chronic     Left carotid bruit 03/09/2016    MI (myocardial infarction)     Myalgia due to statin 06/25/2019    Open wound of right hip 09/14/2021    Osteomyelitis of right femur 07/06/2021    Otorrhea     Pacemaker 11/17/2016    Primary osteoarthritis of left knee 12/23/2020    Psoriasis vulgaris 12/23/2020    S/P coronary artery stent placement 03/09/2016    Sensorineural hearing loss     Seropositive rheumatoid arthritis of multiple sites 12/27/2019    Overview:  -myochrysine '93-'96 -methotrexate '96--->11/98;r/s  restarted 2/99--> 8/14 (anemia) -sulfasalazine- not effective -penicillamine 6/98-->10/98; no effect -leflunomide 11/98--> - Humira '13-->didn't take - Enbrel 12/14-->3/15- no effect!   Last Assessment & Plan:  - \"aching all over\" because she had to be off her anti-rheumatic drugs for 2 weeks in preparation for her R knee surgery - he    Sick sinus syndrome 12/27/2019    Sjogren's disease     Spondylolisthesis of lumbar region 01/17/2018    Syncope, recurrent 02/08/2021    Urinary tract infection     UTI (urinary tract infection) 04/19/2024   ,   Past Surgical History:   Procedure Laterality Date    A-V CARDIAC PACEMAKER INSERTION  2016    ATRIAL CARDIAC PACEMAKER INSERTION      CARDIAC CATHETERIZATION      CATARACT EXTRACTION      CERVICAL CORPECTOMY N/A 3/3/2021    Procedure: CERVICAL 6 CORPECTOMY WITH TITANIUM CAGE WITH NEURO MONITORING;  Surgeon: " Bandar Shea MD;  Location:  [facility] OR;  Service: Neurosurgery;  Laterality: N/A;    COLONOSCOPY  11/08/2011    One fold in the ascending colon which showed ulcer otherwise normal exam    COLONOSCOPY  11/12/2004    Normal exam repeat in five years    CORONARY ANGIOPLASTY WITH STENT PLACEMENT      X 2; 2013 & 2014    ENDOSCOPY  07/10/2014    Normal exam    ENDOSCOPY N/A 5/30/2024    Procedure: ESOPHAGOGASTRODUODENOSCOPY WITH ANESTHESIA;  Surgeon: Taiwo Sanchez MD;  Location:  [facility];  Service: Gastroenterology;  Laterality: N/A;  preop; dysphagia  postop: balloon dilation  PCP Jesus Manuel jeff    FLAP LEG Right 9/14/2021    Procedure: RIGHT GLUTEAL FASCIOCUTANEOUS ADVANCEMENT FLAP AND RIGHT TENSOR FASCIAL JESSICA FLAP;  Surgeon: Amadeo Turner MD;  Location:  [facility];  Service: Plastics;  Laterality: Right;    HIP ABDUCTION TENOTOMY BILATERAL Right 1/14/2021    Procedure: RIGHT HIP GLUTEUS MEDLUS / MINIMUS REPAIR, POSSIBLE ACHILLES ALLOGRAFT;  Surgeon: Nino Carlson MD;  Location:  [facility];  Service: Orthopedics;  Laterality: Right;    INCISION AND DRAINAGE ABSCESS Right 6/4/2022    Procedure: INCISION AND DRAINAGE ABSCESS right hip;  Surgeon: Magda Salcido MD;  Location:  [facility];  Service: General;  Laterality: Right;    INCISION AND DRAINAGE ABSCESS Right 6/10/2022    Procedure: RIGHT HIP INCISION AND DRAINAGE. MD NEEDS 3L VANC IRRIGATION, CURRETTES, DAICANS, KERLEX ROLLS;  Surgeon: Amadeo Turner MD;  Location:  [facility];  Service: Plastics;  Laterality: Right;    INCISION AND DRAINAGE HIP Right 2/9/2021    Procedure: HIP INCISION AND DRAINAGE;  Surgeon: Nino Carlson MD;  Location:  [facility];  Service: Orthopedics;  Laterality: Right;    INCISION AND DRAINAGE LEG Right 10/24/2021    Procedure: INCISION AND DRAINAGE LOWER EXTREMITY;  Surgeon: Amadeo Turner MD;  Location:  [facility];  Service: Plastics;  Laterality: Right;    INCISION AND DRAINAGE OF WOUND Right 7/8/2021     Procedure: INCISION AND DRAINAGE WOUND RIGHT HIP;  Surgeon: James Huntley MD;  Location:  [facility];  Service: Orthopedics;  Laterality: Right;    JOINT REPLACEMENT      KYPHOPLASTY WITH BIOPSY Bilateral 10/26/2021    Procedure: THOARCIC 12 KYPHOPLASTY WITH BIOPSY;  Surgeon: Bandar Shea MD;  Location:  [facility];  Service: Neurosurgery;  Laterality: Bilateral;    LEG DEBRIDEMENT Right 9/14/2021    Procedure: DEBRIDEMENT OF RIGHT HIP WOUND, RIGHT GLUTEAL FASCIOCUTANEOUS ADVANCEMENT FLAP AND RIGHT TENSOR FASCIAL JESSICA FLAP;  Surgeon: Amadeo Turner MD;  Location:  [facility];  Service: Plastics;  Laterality: Right;    LUMBAR DISCECTOMY Right 3/23/2021    Procedure: LUMBAR DISCECTOMY MICRO, Lumbar 1/2 right;  Surgeon: Bandar Shea MD;  Location:  [facility];  Service: Neurosurgery;  Laterality: Right;    LUMBAR FUSION N/A 1/19/2018    Procedure: L3-4,L4-5 DECOMPRESSION, POSTERIOR SPINAL FUSION WITH INSTRUMENTATION;  Surgeon: Fortino Oropeza MD;  Location:  [facility];  Service:     LUMBAR LAMINECTOMY WITH FUSION Left 1/17/2018    Procedure: LEFT L3-4 L4-5 LATERAL LUMBAR INTERBODY FUSION;  Surgeon: Fortino Oropeza MD;  Location:  [facility];  Service:     MASS EXCISION Right 4/23/2024    Procedure: RIGHT BUTTOCK MASS EXCISION;  Surgeon: Moises Keyes MD;  Location:  [facility];  Service: General;  Laterality: Right;    MYRINGOTOMY W/ TUBES  09/04/2014    TUBES NO LONGER IN PLACE    OTHER SURGICAL HISTORY      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 so hardware was removed and spacers were placed    REPLACEMENT TOTAL KNEE Right    ,   Family History   Problem Relation Age of Onset    Cancer Mother         Lung cancer    Heart disease Father         Doied of heart. Attack   ,   Social History     Tobacco Use    Smoking status: Never     Passive exposure: Never    Smokeless tobacco: Never   Vaping Use    Vaping status: Never Used   Substance Use Topics    Alcohol use: No    Drug use: Never   ,      Medications    Prior to Admission medications    Medication Sig Start Date End Date Taking? Authorizing Provider   acetaminophen (TYLENOL) 325 MG tablet Take 2 tablets by mouth Every 6 (Six) Hours As Needed for Mild Pain (mild pain). Indications: Fever, Pain 6/4/22  Yes Leila Daly MD   apixaban (Eliquis) 5 MG tablet tablet Take 1 tablet by mouth 2 (Two) Times a Day. 4/23/24  Yes Nayan Hale DO   Ascorbic Acid (Vitamin C) 500 MG capsule Take 1 tablet by mouth Daily. Indications: Inadequate Vitamin C 5/16/24  Yes Leila Daly MD   aspirin 81 MG EC tablet Take 1 tablet by mouth Daily. Indications: Buildup of Plaques in Large Arteries Leading to the Brain 6/1/24  Yes Leila Daly MD   carvedilol (COREG) 25 MG tablet Take 1 tablet by mouth 2 (Two) Times a Day With Meals. Indications: High Blood Pressure Disorder 4/21/24  Yes Leila Daly MD   Diclofenac Sodium (VOLTAREN) 1 % gel gel Apply 4 g topically to the appropriate area as directed 4 (Four) Times a Day As Needed (Mild pain). Indications: Joint Damage causing Pain and Loss of Function 11/4/21  Yes Leila Daly MD   DULoxetine (CYMBALTA) 60 MG capsule Take 1 capsule by mouth Daily. Indications: Musculoskeletal Pain 4/21/24  Yes Leila Daly MD   estradiol (ESTRACE) 0.1 MG/GM vaginal cream Insert 2 g into the vagina Daily. 5/31/24  Yes Kris Cade MD   ferrous sulfate 324 (65 Fe) MG tablet delayed-release EC tablet Take 1 tablet by mouth Daily With Breakfast. Indications: Iron Deficiency 4/21/24  Yes Leila Daly MD   folic acid (FOLVITE) 1 MG tablet Take 1 tablet by mouth Daily. Indications: Anemia From Inadequate Folic Acid 4/21/24  Yes Leila Daly MD   furosemide (LASIX) 40 MG tablet Take 1 tablet by mouth Daily. Indications: Cardiac Failure, Edema 5/25/24  Yes Leila Daly MD   isosorbide mononitrate (IMDUR) 60 MG 24 hr tablet Take 1 tablet by mouth Daily.  Indications: Stable Angina Pectoris 4/21/24  Yes Leila Daly MD   leflunomide (ARAVA) 20 MG tablet Take 1 tablet by mouth every night at bedtime. Indications: Rheumatoid Arthritis 2/6/23  Yes Moises Oliveira MD   levothyroxine (SYNTHROID, LEVOTHROID) 75 MCG tablet Take 1 tablet by mouth Daily. Indications: Underactive Thyroid 5/5/24  Yes Leila Daly MD   lidocaine (Lidoderm) 5 % Place 1 patch on the skin as directed by provider Daily As Needed for Mild Pain. Remove & Discard patch within 12 hours or as directed by MD 4/9/24  Yes Niki Palma APRN   losartan (COZAAR) 50 MG tablet Take 1 tablet by mouth Daily. Indications: High Blood Pressure Disorder 4/21/24  Yes Leila Daly MD   methenamine (HIPREX) 1 g tablet Take 1 tablet by mouth 2 (Two) Times a Day With Meals.   Yes Leila Daly MD   multivitamin with minerals tablet tablet Take 1 tablet by mouth Daily. 3/6/21  Yes Taiwo Prado APRN   pantoprazole (Protonix) 40 MG EC tablet Take 1 tablet by mouth Daily for 90 days. 5/31/24 8/29/24 Yes Kris Cade MD   predniSONE (DELTASONE) 5 MG tablet Take 1 tablet by mouth Daily. Indications: Acute Joint Inflammation in Gout 4/21/24  Yes Leila Daly MD   spironolactone (ALDACTONE) 25 MG tablet Take 1 tablet by mouth Daily. 6/6/24  Yes Anamaria White APRN   traMADol (ULTRAM) 50 MG tablet Take 1 tablet by mouth Every 12 (Twelve) Hours As Needed for Moderate Pain. Indications: Pain 5/16/24  Yes Leila Daly MD   valACYclovir (VALTREX) 500 MG tablet Take 1 tablet by mouth Daily As Needed. Indications: Shingles 4/21/24  Yes Leila Daly MD   nitroglycerin (NITROSTAT) 0.4 MG SL tablet Place 1 tablet under the tongue Every 5 (Five) Minutes As Needed for Chest Pain. Take no more than 3 doses in 15 minutes.  Indications: Acute Angina Pectoris 4/21/24   Leila Daly MD       Current Facility-Administered Medications   Medication Dose  Route Frequency Provider Last Rate Last Admin    acetaminophen (TYLENOL) tablet 650 mg  650 mg Oral Q6H PRN Ky Plascencia MD   650 mg at 06/19/24 1403    ascorbic acid (VITAMIN C) tablet 500 mg  500 mg Oral Daily Ky Plascencia MD   500 mg at 06/23/24 0826    aspirin EC tablet 81 mg  81 mg Oral Daily Ky Plascencia MD   81 mg at 06/20/24 0958    sennosides-docusate (PERICOLACE) 8.6-50 MG per tablet 2 tablet  2 tablet Oral BID PRN Ky Plascencia MD        And    polyethylene glycol (MIRALAX) packet 17 g  17 g Oral Daily PRN Ky Plascencia MD   17 g at 06/15/24 1553    And    bisacodyl (DULCOLAX) EC tablet 5 mg  5 mg Oral Daily PRN Ky Plascencia MD        And    bisacodyl (DULCOLAX) suppository 10 mg  10 mg Rectal Daily PRN Ky Plascencia MD        Calcium Replacement - Follow Nurse / BPA Driven Protocol   Does not apply PRN Ky Plascencia MD        carvedilol (COREG) tablet 25 mg  25 mg Oral BID With Meals Ky Plascencia MD   25 mg at 06/23/24 0825    cefepime 2000 mg IVPB in 100 mL NS (MBP)  2,000 mg Intravenous Q8H Maggie Bang MD   2,000 mg at 06/23/24 0409    Diclofenac Sodium (VOLTAREN) 1 % gel 4 g  4 g Topical 4x Daily PRN Ky Plascencia MD        DULoxetine (CYMBALTA) DR capsule 60 mg  60 mg Oral Daily Ky Plascencia MD   60 mg at 06/23/24 0826    Enoxaparin Sodium (LOVENOX) syringe 90 mg  1 mg/kg Subcutaneous Q12H Saulo Ambriz MD   90 mg at 06/23/24 0611    estradiol (ESTRACE) vaginal cream 2 applicator  2 g Vaginal Daily Ky Plascencia MD   2 applicator at 06/23/24 0826    ferrous sulfate tablet 325 mg  325 mg Oral Daily With Breakfast Ky Plascencia MD   325 mg at 06/23/24 0826    folic acid (FOLVITE) tablet 1,000 mcg  1,000 mcg Oral Daily Ky Plascencia MD   1,000 mcg at 06/23/24 0825    furosemide (LASIX) tablet 40 mg  40 mg Oral Daily Ky Plascencia MD   40 mg at 06/23/24 0826    isosorbide mononitrate (IMDUR) 24 hr tablet 60 mg  60 mg Oral  Daily Ky Plascencia MD   60 mg at 06/23/24 0825    leflunomide (ARAVA) tablet 20 mg  20 mg Oral Daily Ky Plascencia MD   20 mg at 06/23/24 0826    levoFLOXacin (LEVAQUIN) 750 mg/150 mL D5W (premix) (LEVAQUIN) 750 mg  750 mg Intravenous Q24H Saurabh Jo  mL/hr at 06/22/24 2018 750 mg at 06/22/24 2018    levothyroxine (SYNTHROID, LEVOTHROID) tablet 75 mcg  75 mcg Oral Q AM Ky Plascencia MD   75 mcg at 06/23/24 0611    Lidocaine 4 % 1 patch  1 patch Transdermal Daily PRN Ky Plascencia MD        losartan (COZAAR) tablet 50 mg  50 mg Oral Daily Ky Plascencia MD   50 mg at 06/20/24 0958    Magnesium Low Dose Replacement - Follow Nurse / BPA Driven Protocol   Does not apply PRN Ky Plascencia MD        methenamine (HIPREX) tablet 1 g  1 g Oral BID Ky Plascencia MD   1 g at 06/23/24 0826    metroNIDAZOLE (FLAGYL) IVPB 500 mg  500 mg Intravenous Q8H Saurabh Jo  mL/hr at 06/23/24 0322 500 mg at 06/23/24 0322    multivitamin with minerals 1 tablet  1 tablet Oral Daily Ky Plascencia MD   1 tablet at 06/23/24 0825    naloxone (NARCAN) injection 0.4 mg  0.4 mg Intravenous Q5 Min PRN Ky Plascencia MD        nitroglycerin (NITROSTAT) SL tablet 0.4 mg  0.4 mg Sublingual Q5 Min PRN Ky Plascencia MD        ondansetron (ZOFRAN) injection 4 mg  4 mg Intravenous Q6H PRN Ky Plascencia MD   4 mg at 06/23/24 0847    pantoprazole (PROTONIX) EC tablet 40 mg  40 mg Oral Daily Ky Plascencia MD   40 mg at 06/20/24 0958    Pharmacy to Dose LevoFLOXacin (LEVAQUIN)   Does not apply Continuous PRN Saurabh Jo MD        Phosphorus Replacement - Follow Nurse / BPA Driven Protocol   Does not apply PRN Ky Plascencia MD        Potassium Replacement - Follow Nurse / BPA Driven Protocol   Does not apply PRN Ky Plascencia MD        sodium chloride 0.9 % flush 10 mL  10 mL Intravenous PRN Ky Plascencia MD   10 mL at 06/12/24 2200    sodium chloride 0.9 % flush 10 mL  10 mL  Intravenous Q12H Ky Plascencia MD   10 mL at 06/23/24 0855    sodium chloride 0.9 % flush 10 mL  10 mL Intravenous PRN Ky Plascencia MD        sodium chloride 0.9 % infusion 40 mL  40 mL Intravenous PRN Ky Plascencia MD        spironolactone (ALDACTONE) tablet 25 mg  25 mg Oral Daily Ky Plascencia MD   25 mg at 06/23/24 0826    traMADol (ULTRAM) tablet 50 mg  50 mg Oral Q12H PRN Ky Plascencia MD   50 mg at 06/20/24 2012       Allergies:  Atorvastatin, Amoxicillin, Escitalopram, Nabumetone, Niacin er, Penicillin g, Penicillins, and Simvastatin    Review of Systems  Review of Systems   Constitutional: Positive for malaise/fatigue.   Cardiovascular:  Negative for dyspnea on exertion and leg swelling.       Objective     Physical Exam:  Patient Vitals for the past 24 hrs:   BP Temp Temp src Pulse Resp SpO2   06/23/24 0741 163/72 98.1 °F (36.7 °C) Oral 83 18 94 %   06/23/24 0300 142/50 98.3 °F (36.8 °C) Oral 84 20 93 %   06/22/24 2302 134/61 98.5 °F (36.9 °C) Oral 85 18 94 %   06/22/24 2048 143/60 98.3 °F (36.8 °C) Oral 90 16 93 %   06/22/24 1634 155/55 99.6 °F (37.6 °C) Oral 84 18 94 %     Constitutional:       Appearance: Frail. Chronically ill-appearing.   Pulmonary:      Effort: Pulmonary effort is normal.      Breath sounds: Normal breath sounds.   Cardiovascular:      PMI at left midclavicular line. Normal rate. Regular rhythm.      Murmurs: There is no murmur.      No gallop.  No click. No rub.   Edema:     Peripheral edema absent.   Abdominal:      General: Bowel sounds are normal.   Musculoskeletal: Normal range of motion.      Cervical back: Normal range of motion and neck supple. Skin:     General: Skin is warm.   Neurological:      Mental Status: Alert and oriented to person, place and time.         Results Review:   I reviewed the patient's new clinical results.    Lab Results (last 72 hours)       Procedure Component Value Units Date/Time    CBC (No Diff) [[id number]]  (Abnormal)  Collected: 06/23/24 0428    Specimen: Blood Updated: 06/23/24 0540     WBC 5.03 10*3/mm3      RBC 2.75 10*6/mm3      Hemoglobin 7.7 g/dL      Hematocrit 26.0 %      MCV 94.5 fL      MCH 28.0 pg      MCHC 29.6 g/dL      RDW 18.6 %      RDW-SD 64.7 fl      MPV 10.2 fL      Platelets 170 10*3/mm3     Basic Metabolic Panel [[id number]]  (Normal) Collected: 06/23/24 0428    Specimen: Blood Updated: 06/23/24 0513     Glucose 90 mg/dL      BUN 14 mg/dL      Creatinine 0.86 mg/dL      Sodium 139 mmol/L      Potassium 3.8 mmol/L      Chloride 102 mmol/L      CO2 25.0 mmol/L      Calcium 8.6 mg/dL      BUN/Creatinine Ratio 16.3     Anion Gap 12.0 mmol/L      eGFR 72.8 mL/min/1.73     Narrative:      GFR Normal >60  Chronic Kidney Disease <60  Kidney Failure <15      Urine Culture - Urine, Urine, Catheter [[id number]]  (Normal) Collected: 06/21/24 1116    Specimen: Urine, Catheter Updated: 06/22/24 1152     Urine Culture No growth    Comprehensive Metabolic Panel [[id number]]  (Abnormal) Collected: 06/22/24 0453    Specimen: Blood Updated: 06/22/24 0534     Glucose 99 mg/dL      BUN 16 mg/dL      Creatinine 0.93 mg/dL      Sodium 138 mmol/L      Potassium 4.2 mmol/L      Chloride 103 mmol/L      CO2 28.0 mmol/L      Calcium 9.1 mg/dL      Total Protein 6.1 g/dL      Albumin 2.6 g/dL      ALT (SGPT) 18 U/L      AST (SGOT) 19 U/L      Alkaline Phosphatase 81 U/L      Total Bilirubin 0.4 mg/dL      Globulin 3.5 gm/dL      A/G Ratio 0.7 g/dL      BUN/Creatinine Ratio 17.2     Anion Gap 7.0 mmol/L      eGFR 66.3 mL/min/1.73     Narrative:      GFR Normal >60  Chronic Kidney Disease <60  Kidney Failure <15      CBC (No Diff) [[id number]]  (Abnormal) Collected: 06/22/24 0453    Specimen: Blood Updated: 06/22/24 0515     WBC 6.21 10*3/mm3      RBC 2.86 10*6/mm3      Hemoglobin 8.1 g/dL      Hematocrit 27.2 %      MCV 95.1 fL      MCH 28.3 pg      MCHC 29.8 g/dL      RDW 18.2 %      RDW-SD 62.5 fl      MPV 10.0 fL      Platelets 185  10*3/mm3     Basic Metabolic Panel [[id number]]  (Abnormal) Collected: 06/21/24 0428    Specimen: Blood Updated: 06/21/24 0510     Glucose 98 mg/dL      BUN 18 mg/dL      Creatinine 1.01 mg/dL      Sodium 137 mmol/L      Potassium 3.8 mmol/L      Chloride 101 mmol/L      CO2 29.0 mmol/L      Calcium 9.0 mg/dL      BUN/Creatinine Ratio 17.8     Anion Gap 7.0 mmol/L      eGFR 60.0 mL/min/1.73     Narrative:      GFR Normal >60  Chronic Kidney Disease <60  Kidney Failure <15      CBC (No Diff) [[id number]]  (Abnormal) Collected: 06/21/24 0428    Specimen: Blood Updated: 06/21/24 0441     WBC 6.22 10*3/mm3      RBC 2.73 10*6/mm3      Hemoglobin 7.8 g/dL      Hematocrit 25.4 %      MCV 93.0 fL      MCH 28.6 pg      MCHC 30.7 g/dL      RDW 18.6 %      RDW-SD 62.0 fl      MPV 10.3 fL      Platelets 200 10*3/mm3             Lab Results   Component Value Date    ECHOEFEST 65 02/10/2021       Imaging Results (Last 72 Hours)       Procedure Component Value Units Date/Time    NM HIDA SCAN WITHOUT PHARMACOLOGICAL INTERVENTION [[id number]] Collected: 06/22/24 1553     Updated: 06/22/24 1559    Narrative:      EXAMINATION: NM HIDA SCAN WITHOUT PHARMACOLOGICAL INTERVENTION-   6/22/2024 3:53 PM     HISTORY: Abdominal pain. Gallstones.     Radiopharmaceutical: Tc99m Hepatolite 5.20 mCi, IV.     Following intravenous injection of 5.2 mCi mebrofenin and multiple  planar images were obtained of the upper abdomen. This demonstrates  normal accumulation of activity within the hepatic parenchyma with  biliary activity identified 15 to 20 minutes post injection. There is no  evidence of common bile duct obstruction with activity emptied from the  common duct into the C-loop of the duodenum and proximal jejunum. There  is no visualization of the gallbladder at 60 minutes. An additional  delayed image of 90 minutes also demonstrates nonvisualization of the  gallbladder.        Impression  1. Nonvisualization of the gallbladder on images obtained  up to 90  minutes. Cystic duct obstruction and acute cholecystitis cannot be  excluded on the basis of this exam.  2. No evidence of common bile duct obstruction with emptying of activity  into the C-loop of the duodenum and proximal jejunum.  3. Gallstones and sludge were demonstrated on recent ultrasound. This  combined with nonvisualization of the gallbladder exclude performance of  an ejection fraction..           This report was signed and finalized on 6/22/2024 3:55 PM by Dr. Neymar Calderon MD.       US Gallbladder [[id number]] Collected: 06/22/24 1037     Updated: 06/22/24 1043    Narrative:      US GALLBLADDER-     HISTORY: Quadrant pain, gallstones     COMPARISON: CT abdomen pelvis 6/20/2024     FINDINGS: Sonographic imaging of the right upper quadrant is performed.     Images of the pancreas are unremarkable. The proximal and mid abdominal  aorta are normal in caliber. Distal aorta is obscured by overlying  shadowing bowel gas. Proximal IVC is patent.     No focal liver mass is identified. Appropriate directional flow within  the main portal vein. Gallbladder is moderately distended containing  small shadowing stones and sludge within the region of the gallbladder  neck. No gallbladder wall thickening or pericholecystic fluid is  visualized. Common bile duct is normal in caliber measuring 4 mm.     Right kidney appears normal measuring 10.9 cm in length.       Impression:      1. Cholelithiasis with small shadowing stones and sludge within the  region of the gallbladder neck. Moderate distention of the gallbladder,  however no gallbladder wall thickening or pericholecystic fluid  identified. No biliary dilatation.     This report was signed and finalized on 6/22/2024 10:39 AM by Dr. Trinity Gómez MD.       XR Abdomen 1 View [[id number]] Collected: 06/21/24 1138     Updated: 06/21/24 1142    Narrative:      XR ABDOMEN 1 VW- 6/21/2024 10:00 AM     HISTORY: stent; N39.0-Urinary tract infection, site not  specified;  R41.82-Altered mental status, unspecified; S31.819S-Unspecified open  wound of right buttock, sequela; L72.9-Follicular cyst of the skin and  subcutaneous tissue, unspecified; R13.10-Dysphagia, unspecified;  Z74.09-Other reduced mobility; N13.2-Hydronephrosis with renal and  ureteral calculous obstruction     COMPARISON: None     FLUOROSCOPY TIME: 0.3 minutes     FLUOROSCOPY DOSE: 2.8 mGy     NUMBER OF IMAGES: 3       Impression:         Intraoperative fluoroscopic images during cystoscopy with left  uteroscopy and stent placement.     Please refer to the operative note for more details.        This report was signed and finalized on 6/21/2024 11:39 AM by Dr. Wesley Mcfadden MD.       FL Surgery Fluoro [[id number]] Resulted: 06/21/24 1140     Updated: 06/21/24 1140    Narrative:      This procedure was auto-finalized with no dictation required.    CT Abdomen Pelvis Without Contrast [[id number]] Collected: 06/20/24 1333     Updated: 06/20/24 1350    Narrative:      CT ABDOMEN PELVIS WO CONTRAST- 6/20/2024 11:43 AM     HISTORY: Rule out kidney stone.; N39.0-Urinary tract infection, site not  specified; R41.82-Altered mental status, unspecified;  S31.819S-Unspecified open wound of right buttock, sequela;  L72.9-Follicular cyst of the skin and subcutaneous tissue, unspecified;  R13.10-Dysphagia, unspecified; Z74.09-Other reduced mobility      COMPARISON: Previous CT pelvis with contrast dated 4/21/2024     DLP: 633.64 mGy.cm . All CT scans are performed using dose optimization  techniques as appropriate to the performed exam and including at least  one of the following: Automated exposure control, adjustment of the mA  and/or kV according to size, and the use of the iterative reconstruction  technique.     TECHNIQUE: Noncontrast enhanced images of the abdomen and pelvis  obtained without oral contrast.     FINDINGS:  Left lingular and left lower lobe subsegmental atelectasis is present.  The heart is mildly  enlarged. Extensive coronary artery calcifications  are present. A transvenous pacer is in place..     LIVER: No focal liver lesion.     BILIARY SYSTEM: Gallstones are demonstrated within the gallbladder neck.  Gallbladder is mildly distended. There is no biliary dilatation  present..     PANCREAS: No focal pancreatic lesion.     SPLEEN: Splenic granulomas are present. No evidence of splenomegaly..     KIDNEYS AND ADRENALS: The adrenals are unremarkable. Calcifications  within the renal mahesh bilaterally are felt to be vascular in nature. No  perinephric fluid collections are present. No discrete renal mass. There  is asymmetric dilatation of the upper tracts of the left kidney. The  left ureter is asymmetrically distended to the level of the true pelvis.  There is a distal 6 x 5 mm intraureteral stone on image 117 of series 2  within the ureter approximately 4 cm above the left UVJ. The left ureter  below the level of the stone is decompressed.. The right ureter is  decompressed and normal in appearance..     RETROPERITONEUM: No mass, lymphadenopathy or hemorrhage.     GI TRACT: Constipation is present with a large volume of stool  throughout the colon. The small bowel is normal in distribution and  appearance. There is no gastric wall thickening or gastric outlet  obstruction.. The appendix is visualized and unremarkable.     OTHER: There is no mesenteric mass, lymphadenopathy or fluid collection.  The osseous structures and soft tissues demonstrate no worrisome  lesions. There is previous compression deformity at T12 with previous  kyphoplasty. The patient has undergone fusion at the L3-L4 and L4-L5  level with pedicle screws and interbody grafts. There is osteopenia. A  small fat-containing periumbilical hernia is present.     PELVIS: There is a calcified fibroid within the lower uterine segment.  No adnexal mass or free fluid.. The urinary bladder is normal in  appearance.       Impression:      1. Obstructive  uropathy on the left with mild to moderate dilatation of  the upper tracts of the left kidney and left ureter into the true pelvis  where there is a 5 x 6 mm calculus within the left ureter approximately  4 to 5 cm above the UVJ. The right ureter is decompressed and normal in  appearance. No evidence of renal mass. No perinephric fluid collection  present.  2. The gallbladder is mildly distended. There are gallstones within the  gallbladder neck. No pericholecystic inflammatory changes or biliary  dilatation.  3. Constipation. No obstruction or free air. Normal appendix.  3. Calcified fibroid within the lower uterine segment.  4. Mild constipation. No obstruction or free air. Normal appendix.  5. Small fat-containing periumbilical hernia.  6. Previous kyphoplasty at T12. Previous fusion at L3-L4 and L4-L5.  7. Left basilar atelectasis..           This report was signed and finalized on 6/20/2024 1:46 PM by Dr. Neymar Calderon MD.                     Assessment & Plan     Cardiac risk assessment: We are asked to evaluate the patient to provide cardiac risk assessment for potential intra-abdominal surgery (cholecystectomy).  She does have known history of ischemic heart disease as well as congestive heart failure, however both of these appear to be stable this time with no signs or symptoms of decompensation or worsening angina.  Based off of this her revised cardiac risk index score (1 point for CHF acute history, ischemic heart disease, and elevated risk surgery given ReDS intraperitoneal) her risk of death, MI, or cardiac arrest in the next 30 days is 15%.  Her functional capacity is limited given her baseline immobility.  However, there are no clinical symptoms to suggest that she has underlying worsening of her known cardiac conditions.  Because of this, I believe that pursuing surgical intervention should be made as a risk-benefit discussion between the patient and surgeon and should not be precluded based off  of cardiac history.  -Patient has been stopped on Eliquis and started on Lovenox in anticipation of surgery, likely okay to discontinue this prior to surgery and resume as soon as possible.  Patient should be maintained on aspirin 81 mg daily indefinitely throughout the perioperative period      At this time cardiology will sign off.  If there are any further use of our services do not hesitate to contact us.        Electronically signed by DANIELA Loera, 06/23/24, 9:03 AM CDT.

## 2024-06-24 PROBLEM — K81.0 ACUTE CHOLECYSTITIS: Status: ACTIVE | Noted: 2024-06-12

## 2024-06-24 LAB
AMMONIA BLD-SCNC: 14 UMOL/L (ref 11–51)
ANION GAP SERPL CALCULATED.3IONS-SCNC: 8 MMOL/L (ref 5–15)
BUN SERPL-MCNC: 12 MG/DL (ref 8–23)
BUN/CREAT SERPL: 15 (ref 7–25)
CALCIUM SPEC-SCNC: 8.6 MG/DL (ref 8.6–10.5)
CHLORIDE SERPL-SCNC: 105 MMOL/L (ref 98–107)
CO2 SERPL-SCNC: 25 MMOL/L (ref 22–29)
CREAT SERPL-MCNC: 0.8 MG/DL (ref 0.57–1)
DEPRECATED RDW RBC AUTO: 62.8 FL (ref 37–54)
EGFRCR SERPLBLD CKD-EPI 2021: 79.4 ML/MIN/1.73
ERYTHROCYTE [DISTWIDTH] IN BLOOD BY AUTOMATED COUNT: 18.3 % (ref 12.3–15.4)
GLUCOSE SERPL-MCNC: 98 MG/DL (ref 65–99)
HCT VFR BLD AUTO: 26.1 % (ref 34–46.6)
HGB BLD-MCNC: 7.8 G/DL (ref 12–15.9)
MCH RBC QN AUTO: 28.1 PG (ref 26.6–33)
MCHC RBC AUTO-ENTMCNC: 29.9 G/DL (ref 31.5–35.7)
MCV RBC AUTO: 93.9 FL (ref 79–97)
PLATELET # BLD AUTO: 175 10*3/MM3 (ref 140–450)
PMV BLD AUTO: 11.8 FL (ref 6–12)
POTASSIUM SERPL-SCNC: 3.8 MMOL/L (ref 3.5–5.2)
RBC # BLD AUTO: 2.78 10*6/MM3 (ref 3.77–5.28)
SODIUM SERPL-SCNC: 138 MMOL/L (ref 136–145)
WBC NRBC COR # BLD AUTO: 4.74 10*3/MM3 (ref 3.4–10.8)

## 2024-06-24 PROCEDURE — 97530 THERAPEUTIC ACTIVITIES: CPT

## 2024-06-24 PROCEDURE — 82140 ASSAY OF AMMONIA: CPT | Performed by: FAMILY MEDICINE

## 2024-06-24 PROCEDURE — 97110 THERAPEUTIC EXERCISES: CPT

## 2024-06-24 PROCEDURE — 94761 N-INVAS EAR/PLS OXIMETRY MLT: CPT

## 2024-06-24 PROCEDURE — 99232 SBSQ HOSP IP/OBS MODERATE 35: CPT | Performed by: UROLOGY

## 2024-06-24 PROCEDURE — 99231 SBSQ HOSP IP/OBS SF/LOW 25: CPT | Performed by: INTERNAL MEDICINE

## 2024-06-24 PROCEDURE — 85027 COMPLETE CBC AUTOMATED: CPT | Performed by: UROLOGY

## 2024-06-24 PROCEDURE — 97535 SELF CARE MNGMENT TRAINING: CPT

## 2024-06-24 PROCEDURE — 94799 UNLISTED PULMONARY SVC/PX: CPT

## 2024-06-24 PROCEDURE — 25010000002 METRONIDAZOLE 500 MG/100ML SOLUTION: Performed by: INTERNAL MEDICINE

## 2024-06-24 PROCEDURE — 80048 BASIC METABOLIC PNL TOTAL CA: CPT | Performed by: FAMILY MEDICINE

## 2024-06-24 PROCEDURE — 25010000002 CEFEPIME PER 500 MG: Performed by: INTERNAL MEDICINE

## 2024-06-24 RX ADMIN — ESTRADIOL 2 APPLICATOR: 0.1 CREAM VAGINAL at 09:54

## 2024-06-24 RX ADMIN — METRONIDAZOLE 500 MG: 500 INJECTION, SOLUTION INTRAVENOUS at 10:42

## 2024-06-24 RX ADMIN — ISOSORBIDE MONONITRATE 60 MG: 60 TABLET, EXTENDED RELEASE ORAL at 09:38

## 2024-06-24 RX ADMIN — ASPIRIN 81 MG: 81 TABLET, COATED ORAL at 09:38

## 2024-06-24 RX ADMIN — DULOXETINE HYDROCHLORIDE 60 MG: 30 CAPSULE, DELAYED RELEASE ORAL at 09:38

## 2024-06-24 RX ADMIN — FUROSEMIDE 40 MG: 40 TABLET ORAL at 09:39

## 2024-06-24 RX ADMIN — METHENAMINE HIPPURATE 1 G: 1000 TABLET ORAL at 09:39

## 2024-06-24 RX ADMIN — CARVEDILOL 25 MG: 25 TABLET, FILM COATED ORAL at 09:38

## 2024-06-24 RX ADMIN — CEFEPIME 2000 MG: 2 INJECTION, POWDER, FOR SOLUTION INTRAVENOUS at 01:39

## 2024-06-24 RX ADMIN — LEVOTHYROXINE SODIUM 75 MCG: 0.07 TABLET ORAL at 06:47

## 2024-06-24 RX ADMIN — Medication 10 ML: at 09:54

## 2024-06-24 RX ADMIN — METRONIDAZOLE 500 MG: 500 INJECTION, SOLUTION INTRAVENOUS at 01:09

## 2024-06-24 RX ADMIN — CEFEPIME 2000 MG: 2 INJECTION, POWDER, FOR SOLUTION INTRAVENOUS at 18:04

## 2024-06-24 RX ADMIN — METRONIDAZOLE 500 MG: 500 INJECTION, SOLUTION INTRAVENOUS at 17:54

## 2024-06-24 RX ADMIN — PANTOPRAZOLE SODIUM 40 MG: 40 TABLET, DELAYED RELEASE ORAL at 09:39

## 2024-06-24 RX ADMIN — CARVEDILOL 25 MG: 25 TABLET, FILM COATED ORAL at 17:51

## 2024-06-24 RX ADMIN — LOSARTAN POTASSIUM 50 MG: 50 TABLET, FILM COATED ORAL at 09:38

## 2024-06-24 RX ADMIN — LEFLUNOMIDE 20 MG: 20 TABLET ORAL at 09:39

## 2024-06-24 RX ADMIN — SPIRONOLACTONE 25 MG: 25 TABLET ORAL at 09:39

## 2024-06-24 RX ADMIN — METHENAMINE HIPPURATE 1 G: 1000 TABLET ORAL at 20:30

## 2024-06-24 RX ADMIN — CEFEPIME 2000 MG: 2 INJECTION, POWDER, FOR SOLUTION INTRAVENOUS at 09:35

## 2024-06-24 NOTE — PLAN OF CARE
Goal Outcome Evaluation:  Plan of Care Reviewed With: patient           Outcome Evaluation: PT tx completed. Pt opened eyes to verbal stimuli. Said my name. Shook head yes and no to <50% commands. Followed no direction for moblity. Dependent for bed mobility, sit EOB x 15 minutes CG/Beata. Attempted to lay down several times, so assisted her back into supine. Positioned for pressure relief. PROM BLE/s

## 2024-06-24 NOTE — THERAPY TREATMENT NOTE
Acute Care - Physical Therapy Treatment Note  [facility]     Patient Name: Tawny Shin  : 1955  MRN: [id number]  Today's Date: 2024      Visit Dx:     ICD-10-CM ICD-9-CM   1. Acute UTI (urinary tract infection)  N39.0 599.0   2. Altered mental status, unspecified altered mental status type  R41.82 780.97   3. Open wound of right buttock, sequela  S31.819S 906.0   4. Cyst of buttocks  L72.9 706.2   5. Dysphagia, unspecified type  R13.10 787.20   6. Impaired mobility [Z74.09]  Z74.09 799.89   7. Hydronephrosis with urinary obstruction due to ureteral calculus  N13.2 592.1     591   8. Acute cholecystitis  K81.0 575.0     Patient Active Problem List   Diagnosis    T12 compression fracture, initial encounter    Chronic embolism and thrombosis of unspecified deep veins of left lower extremity    Chronic anticoagulation    Iron deficiency anemia    Osteoporosis    E coli bacteremia    Epidural hematoma    Pleural effusion, left    Functional neurological symptom disorder with weakness or paralysis    Class 1 obesity due to excess calories with serious comorbidity and body mass index (BMI) of 33.0 to 33.9 in adult    Rheumatoid arthritis involving multiple sites    Chronic heart failure with preserved ejection fraction    Venous insufficiency    Coronary artery disease involving native coronary artery of native heart without angina pectoris    Sick sinus syndrome    Essential hypertension    Pressure injury of skin of heel    Chronic pain    Anemia of chronic disease    Cholelithiasis    Pressure injury of skin of buttock    Near functional paraplegia    Skin cancer    Squamous cell carcinoma of back    Hematoma    Right-sided chest wall pain    Hyperlipidemia LDL goal <70    Malodorous urine    Stage 3b chronic kidney disease    Cyst of buttocks    Sepsis due to Escherichia coli without acute organ dysfunction    Generalized weakness    Paralysis    History of urinary retention    Bacteremia due to  "Enterobacter species    Bacteremia    Hypothyroidism    Abnormal CT of the abdomen    Presence of cardiac pacemaker    Altered mental status    UTI (urinary tract infection)    Esophageal dysphagia    Hydronephrosis with urinary obstruction due to ureteral calculus    Acute cholecystitis     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    Elevated cholesterol     Eustachian tube dysfunction     Heart disease     Herpes simplex     History of transfusion     Hyperlipidemia     Hypertension     Hypothyroidism 12/23/2020    Intrinsic asthma 12/23/2020    Knee dislocation     Labral tear of right hip joint     Laryngitis sicca     Laryngitis, chronic     Left carotid bruit 03/09/2016    MI (myocardial infarction)     Myalgia due to statin 06/25/2019    Open wound of right hip 09/14/2021    Osteomyelitis of right femur 07/06/2021    Otorrhea     Pacemaker 11/17/2016    Primary osteoarthritis of left knee 12/23/2020    Psoriasis vulgaris 12/23/2020    S/P coronary artery stent placement 03/09/2016    Sensorineural hearing loss     Seropositive rheumatoid arthritis of multiple sites 12/27/2019    Overview:  -myochrysine '93-'96 -methotrexate '96--->11/98;r/s  restarted 2/99--> 8/14 (anemia) -sulfasalazine- not effective -penicillamine 6/98-->10/98; no effect -leflunomide 11/98--> - Humira '13-->didn't take - Enbrel 12/14-->3/15- no effect!   Last Assessment & Plan:  - \"aching all over\" because she had to be " off her anti-rheumatic drugs for 2 weeks in preparation for her R knee surgery - he    Sick sinus syndrome 12/27/2019    Sjogren's disease     Spondylolisthesis of lumbar region 01/17/2018    Syncope, recurrent 02/08/2021    Urinary tract infection     UTI (urinary tract infection) 04/19/2024     Past Surgical History:   Procedure Laterality Date    A-V CARDIAC PACEMAKER INSERTION  2016    ATRIAL CARDIAC PACEMAKER INSERTION      CARDIAC CATHETERIZATION      CATARACT EXTRACTION      CERVICAL CORPECTOMY N/A 3/3/2021    Procedure: CERVICAL 6 CORPECTOMY WITH TITANIUM CAGE WITH NEURO MONITORING;  Surgeon: Bandar Shea MD;  Location:  [facility] OR;  Service: Neurosurgery;  Laterality: N/A;    COLONOSCOPY  11/08/2011    One fold in the ascending colon which showed ulcer otherwise normal exam    COLONOSCOPY  11/12/2004    Normal exam repeat in five years    CORONARY ANGIOPLASTY WITH STENT PLACEMENT      X 2; 2013 & 2014    ENDOSCOPY  07/10/2014    Normal exam    ENDOSCOPY N/A 5/30/2024    Procedure: ESOPHAGOGASTRODUODENOSCOPY WITH ANESTHESIA;  Surgeon: Taiwo Sanchez MD;  Location: [facility] ENDOSCOPY;  Service: Gastroenterology;  Laterality: N/A;  preop; dysphagia  postop: balloon dilation  PCP Jesus Manuel jeff    FLAP LEG Right 9/14/2021    Procedure: RIGHT GLUTEAL FASCIOCUTANEOUS ADVANCEMENT FLAP AND RIGHT TENSOR FASCIAL JESSICA FLAP;  Surgeon: Amadeo Turner MD;  Location:  [facility] OR;  Service: Plastics;  Laterality: Right;    HIP ABDUCTION TENOTOMY BILATERAL Right 1/14/2021    Procedure: RIGHT HIP GLUTEUS MEDLUS / MINIMUS REPAIR, POSSIBLE ACHILLES ALLOGRAFT;  Surgeon: Nino Carlson MD;  Location:  [facility];  Service: Orthopedics;  Laterality: Right;    INCISION AND DRAINAGE ABSCESS Right 6/4/2022    Procedure: INCISION AND DRAINAGE ABSCESS right hip;  Surgeon: Magda Salcido MD;  Location:  [facility];  Service: General;  Laterality: Right;    INCISION AND DRAINAGE ABSCESS Right 6/10/2022    Procedure: RIGHT HIP  INCISION AND DRAINAGE. MD NEEDS 3L VANC IRRIGATION, CURRETTES, DAICANS, KERLEX ROLLS;  Surgeon: Amadeo Turner MD;  Location:  [facility];  Service: Plastics;  Laterality: Right;    INCISION AND DRAINAGE HIP Right 2/9/2021    Procedure: HIP INCISION AND DRAINAGE;  Surgeon: Nino Carlson MD;  Location:  [facility];  Service: Orthopedics;  Laterality: Right;    INCISION AND DRAINAGE LEG Right 10/24/2021    Procedure: INCISION AND DRAINAGE LOWER EXTREMITY;  Surgeon: Amadeo Turner MD;  Location:  [facility];  Service: Plastics;  Laterality: Right;    INCISION AND DRAINAGE OF WOUND Right 7/8/2021    Procedure: INCISION AND DRAINAGE WOUND RIGHT HIP;  Surgeon: James Huntley MD;  Location:  [facility];  Service: Orthopedics;  Laterality: Right;    JOINT REPLACEMENT      KYPHOPLASTY WITH BIOPSY Bilateral 10/26/2021    Procedure: THOARCIC 12 KYPHOPLASTY WITH BIOPSY;  Surgeon: Bandar Shea MD;  Location:  [facility];  Service: Neurosurgery;  Laterality: Bilateral;    LEG DEBRIDEMENT Right 9/14/2021    Procedure: DEBRIDEMENT OF RIGHT HIP WOUND, RIGHT GLUTEAL FASCIOCUTANEOUS ADVANCEMENT FLAP AND RIGHT TENSOR FASCIAL JESSICA FLAP;  Surgeon: Amadeo Turner MD;  Location:  [facility];  Service: Plastics;  Laterality: Right;    LUMBAR DISCECTOMY Right 3/23/2021    Procedure: LUMBAR DISCECTOMY MICRO, Lumbar 1/2 right;  Surgeon: Bandar Shea MD;  Location:  [facility];  Service: Neurosurgery;  Laterality: Right;    LUMBAR FUSION N/A 1/19/2018    Procedure: L3-4,L4-5 DECOMPRESSION, POSTERIOR SPINAL FUSION WITH INSTRUMENTATION;  Surgeon: Fortino Oropeza MD;  Location:  [facility];  Service:     LUMBAR LAMINECTOMY WITH FUSION Left 1/17/2018    Procedure: LEFT L3-4 L4-5 LATERAL LUMBAR INTERBODY FUSION;  Surgeon: Fortino Oropeza MD;  Location:  [facility];  Service:     MASS EXCISION Right 4/23/2024    Procedure: RIGHT BUTTOCK MASS EXCISION;  Surgeon: Moises Keyes MD;  Location:  [facility];  Service: General;   Laterality: Right;    MYRINGOTOMY W/ TUBES  09/04/2014    TUBES NO LONGER IN PLACE    OTHER SURGICAL HISTORY      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 so hardware was removed and spacers were placed    REPLACEMENT TOTAL KNEE Right     URETEROSCOPY LASER LITHOTRIPSY WITH STENT INSERTION N/A 6/21/2024    Procedure: URETEROSCOPY LASER LITHOTRIPSY WITH STENT INSERTION LEFT;  Surgeon: Ky Plascencia MD;  Location: [facility] OR;  Service: Urology;  Laterality: N/A;     PT Assessment (Last 12 Hours)       PT Evaluation and Treatment       Row Name 06/24/24 1345 06/24/24 1330       Physical Therapy Time and Intention    Subjective Information --  mostly non verbal; did say my name. And shook head yes and no to about 50% commands  -KJ --    Document Type therapy note (daily note)  -KJ --    Mode of Treatment physical therapy  -KJ --  -KJ    Patient Effort poor  -KJ --      Row Name 06/24/24 1345          General Information    Existing Precautions/Restrictions fall  -KJ       Row Name 06/24/24 1345          Pain    Pretreatment Pain Rating 0/10 - no pain  -KJ     Posttreatment Pain Rating 0/10 - no pain  -KJ       Row Name 06/24/24 1345          Bed Mobility    Supine-Sit [place] (Bed Mobility) maximum assist (25% patient effort)  -KJ     Sit-Supine [place] (Bed Mobility) maximum assist (25% patient effort)  -KJ       Row Name 06/24/24 1345          Balance    Balance Assessment sitting static balance  -KJ     Static Sitting Balance contact guard;minimal assist  -KJ     Dynamic Sitting Balance minimal assist;moderate assist  -KJ     Position, Sitting Balance supported;unsupported;sitting edge of bed  -KJ       Row Name 06/24/24 1345          Motor Skills    Comments, Therapeutic Exercise PROM  -KJ       Row Name             Wound 04/23/24 0936 Right gluteal Incision    Wound - Properties Group Placement Date: 04/23/24  -EP Placement Time: 0936  -EP Present on Original Admission: N  -EP Side: Right  -EP Location: gluteal   -EP Primary Wound Type: Incision  -EP    Retired Wound - Properties Group Placement Date: 04/23/24  -EP Placement Time: 0936  -EP Present on Original Admission: N  -EP Side: Right  -EP Location: gluteal  -EP Primary Wound Type: Incision  -EP    Retired Wound - Properties Group Date first assessed: 04/23/24  -EP Time first assessed: 0936  -EP Present on Original Admission: N  -EP Side: Right  -EP Location: gluteal  -EP Primary Wound Type: Incision  -EP      Row Name 06/24/24 1345          Positioning and Restraints    Pre-Treatment Position in bed  -KJ     Post Treatment Position bed  -KJ     In Bed call light within reach  -KJ               User Key  (r) = Recorded By, (t) = Taken By, (c) = Cosigned By      Initials Name Provider Type    Emilee Gloria PTA Physical Therapist Assistant    Sonny Thompson RN Registered Nurse                    Physical Therapy Education       Title: PT OT SLP Therapies (Done)       Topic: Physical Therapy (Done)       Point: Mobility training (Done)       Learning Progress Summary             Patient Acceptance, E, VU by KS at 6/20/2024 1715    Acceptance, E, VU by MS at 6/18/2024 1502    Comment: role of PT in her care                         Point: Home exercise program (Done)       Learning Progress Summary             Patient Acceptance, E, VU by KS at 6/20/2024 1715    Acceptance, E,D,TB, VU,DU,NR by  at 6/15/2024 1514    Acceptance, E,TB,D, VU,DU,NR by  at 6/14/2024 1550                         Point: Body mechanics (Done)       Learning Progress Summary             Patient Acceptance, E, VU by KS at 6/20/2024 1715    Acceptance, E,D,TB, VU,DU,NR by  at 6/15/2024 1514    Acceptance, E,TB,D, VU,DU,NR by  at 6/14/2024 1550                         Point: Precautions (Done)       Learning Progress Summary             Patient Acceptance, E, VU by KS at 6/20/2024 1715    Acceptance, E,D,TB, VU,DU,NR by KH at 6/15/2024 1514    Acceptance, E,TB,D, VU,DU,NR by  at  6/14/2024 1550                                         User Key       Initials Effective Dates Name Provider Type Discipline    MS 07/11/23 -  Nicole Olson, PT, DPT, NCS Physical Therapist PT    KS 02/27/23 -  Ewelina Morales, RN Registered Nurse Nurse     10/17/23 -  Stephanie Doll RN Registered Nurse Nurse                  PT Recommendation and Plan     Plan of Care Reviewed With: patient  Outcome Evaluation: PT tx completed. Pt opened eyes to verbal stimuli. Said my name. Shook head yes and no to <50% commands. Followed no direction for moblity. Dependent for bed mobility, sit EOB x 15 minutes CG/Beata. Attempted to lay down several times, so assisted her back into supine. Positioned for pressure relief. PROM BLE/s   Outcome Measures       Row Name 06/24/24 1400             How much help from another person do you currently need...    Turning from your back to your side while in flat bed without using bedrails? 1  -KJ      Moving from lying on back to sitting on the side of a flat bed without bedrails? 1  -KJ      Moving to and from a bed to a chair (including a wheelchair)? 1  -KJ      Standing up from a chair using your arms (e.g., wheelchair, bedside chair)? 1  -KJ      Climbing 3-5 steps with a railing? 1  -KJ      To walk in hospital room? 1  -KJ      AM-PAC 6 Clicks Score (PT) 6  -KJ      Highest Level of Mobility Goal 2 --> Bed activities/dependent transfer  -KJ         Functional Assessment    Outcome Measure Options AM-PAC 6 Clicks Basic Mobility (PT)  -KJ                User Key  (r) = Recorded By, (t) = Taken By, (c) = Cosigned By      Initials Name Provider Type    Emilee Gloria, PTA Physical Therapist Assistant                     Time Calculation:    PT Charges       Row Name 06/24/24 1421             Time Calculation    Start Time 1345  -KJ      Stop Time 1424  -KJ      Time Calculation (min) 39 min  -KJ      PT Received On 06/24/24  -KJ      PT Goal Re-Cert Due Date 06/28/24  -KJ          Time Calculation- PT    Total Timed Code Minutes- PT 39 minute(s)  -AGUSTINA                User Key  (r) = Recorded By, (t) = Taken By, (c) = Cosigned By      Initials Name Provider Type    Emilee Gloria PTA Physical Therapist Assistant                  Therapy Charges for Today       Code Description Service Date Service Provider Modifiers Qty    29540058354 HC PT THER PROC EA 15 MIN 6/24/2024 Emilee Paul PTA GP 1    59471642228 HC PT THERAPEUTIC ACT EA 15 MIN 6/24/2024 Emilee Paul PTA GP 2            PT G-Codes  Outcome Measure Options: AM-PAC 6 Clicks Basic Mobility (PT)  AM-PAC 6 Clicks Score (PT): 6  AM-PAC 6 Clicks Score (OT): 15    Emilee Paul PTA  6/24/2024

## 2024-06-24 NOTE — PLAN OF CARE
Goal Outcome Evaluation:  Plan of Care Reviewed With: patient, caregiver        Progress: no change  Outcome Evaluation: Pt c/o nausea, PRN medication given with good results. Pt quiet this shift. Caregiver at bedside. IV abx given as scheduled. Safety maintained. Call light in reach.

## 2024-06-24 NOTE — THERAPY TREATMENT NOTE
Patient Name: Tawny Shin  : 1955    MRN: [id number]                              Today's Date: 2024       Admit Date: 2024    Visit Dx:     ICD-10-CM ICD-9-CM   1. Acute UTI (urinary tract infection)  N39.0 599.0   2. Altered mental status, unspecified altered mental status type  R41.82 780.97   3. Open wound of right buttock, sequela  S31.819S 906.0   4. Cyst of buttocks  L72.9 706.2   5. Dysphagia, unspecified type  R13.10 787.20   6. Impaired mobility [Z74.09]  Z74.09 799.89   7. Hydronephrosis with urinary obstruction due to ureteral calculus  N13.2 592.1     591   8. Acute cholecystitis  K81.0 575.0     Patient Active Problem List   Diagnosis    T12 compression fracture, initial encounter    Chronic embolism and thrombosis of unspecified deep veins of left lower extremity    Chronic anticoagulation    Iron deficiency anemia    Osteoporosis    E coli bacteremia    Epidural hematoma    Pleural effusion, left    Functional neurological symptom disorder with weakness or paralysis    Class 1 obesity due to excess calories with serious comorbidity and body mass index (BMI) of 33.0 to 33.9 in adult    Rheumatoid arthritis involving multiple sites    Chronic heart failure with preserved ejection fraction    Venous insufficiency    Coronary artery disease involving native coronary artery of native heart without angina pectoris    Sick sinus syndrome    Essential hypertension    Pressure injury of skin of heel    Chronic pain    Anemia of chronic disease    Cholelithiasis    Pressure injury of skin of buttock    Near functional paraplegia    Skin cancer    Squamous cell carcinoma of back    Hematoma    Right-sided chest wall pain    Hyperlipidemia LDL goal <70    Malodorous urine    Stage 3b chronic kidney disease    Cyst of buttocks    Sepsis due to Escherichia coli without acute organ dysfunction    Generalized weakness    Paralysis    History of urinary retention    Bacteremia due to  "Enterobacter species    Bacteremia    Hypothyroidism    Abnormal CT of the abdomen    Presence of cardiac pacemaker    Altered mental status    UTI (urinary tract infection)    Esophageal dysphagia    Hydronephrosis with urinary obstruction due to ureteral calculus    Acute cholecystitis     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    Elevated cholesterol     Eustachian tube dysfunction     Heart disease     Herpes simplex     History of transfusion     Hyperlipidemia     Hypertension     Hypothyroidism 12/23/2020    Intrinsic asthma 12/23/2020    Knee dislocation     Labral tear of right hip joint     Laryngitis sicca     Laryngitis, chronic     Left carotid bruit 03/09/2016    MI (myocardial infarction)     Myalgia due to statin 06/25/2019    Open wound of right hip 09/14/2021    Osteomyelitis of right femur 07/06/2021    Otorrhea     Pacemaker 11/17/2016    Primary osteoarthritis of left knee 12/23/2020    Psoriasis vulgaris 12/23/2020    S/P coronary artery stent placement 03/09/2016    Sensorineural hearing loss     Seropositive rheumatoid arthritis of multiple sites 12/27/2019    Overview:  -myochrysine '93-'96 -methotrexate '96--->11/98;r/s  restarted 2/99--> 8/14 (anemia) -sulfasalazine- not effective -penicillamine 6/98-->10/98; no effect -leflunomide 11/98--> - Humira '13-->didn't take - Enbrel 12/14-->3/15- no effect!   Last Assessment & Plan:  - \"aching all over\" because she had to be " off her anti-rheumatic drugs for 2 weeks in preparation for her R knee surgery - he    Sick sinus syndrome 12/27/2019    Sjogren's disease     Spondylolisthesis of lumbar region 01/17/2018    Syncope, recurrent 02/08/2021    Urinary tract infection     UTI (urinary tract infection) 04/19/2024     Past Surgical History:   Procedure Laterality Date    A-V CARDIAC PACEMAKER INSERTION  2016    ATRIAL CARDIAC PACEMAKER INSERTION      CARDIAC CATHETERIZATION      CATARACT EXTRACTION      CERVICAL CORPECTOMY N/A 3/3/2021    Procedure: CERVICAL 6 CORPECTOMY WITH TITANIUM CAGE WITH NEURO MONITORING;  Surgeon: Bandar Shea MD;  Location:  [facility];  Service: Neurosurgery;  Laterality: N/A;    COLONOSCOPY  11/08/2011    One fold in the ascending colon which showed ulcer otherwise normal exam    COLONOSCOPY  11/12/2004    Normal exam repeat in five years    CORONARY ANGIOPLASTY WITH STENT PLACEMENT      X 2; 2013 & 2014    ENDOSCOPY  07/10/2014    Normal exam    ENDOSCOPY N/A 5/30/2024    Procedure: ESOPHAGOGASTRODUODENOSCOPY WITH ANESTHESIA;  Surgeon: Taiwo Sanchez MD;  Location: [facility] ENDOSCOPY;  Service: Gastroenterology;  Laterality: N/A;  preop; dysphagia  postop: balloon dilation  PCP Jesus Manuel jeff    FLAP LEG Right 9/14/2021    Procedure: RIGHT GLUTEAL FASCIOCUTANEOUS ADVANCEMENT FLAP AND RIGHT TENSOR FASCIAL JESISCA FLAP;  Surgeon: Amadeo Turner MD;  Location:  [facility];  Service: Plastics;  Laterality: Right;    HIP ABDUCTION TENOTOMY BILATERAL Right 1/14/2021    Procedure: RIGHT HIP GLUTEUS MEDLUS / MINIMUS REPAIR, POSSIBLE ACHILLES ALLOGRAFT;  Surgeon: Nino Carlson MD;  Location:  [facility];  Service: Orthopedics;  Laterality: Right;    INCISION AND DRAINAGE ABSCESS Right 6/4/2022    Procedure: INCISION AND DRAINAGE ABSCESS right hip;  Surgeon: Magda Salcido MD;  Location:  [facility];  Service: General;  Laterality: Right;    INCISION AND DRAINAGE ABSCESS Right 6/10/2022    Procedure: RIGHT HIP  INCISION AND DRAINAGE. MD NEEDS 3L VANC IRRIGATION, CURRETTES, DAICANS, KERLEX ROLLS;  Surgeon: Amadeo Turner MD;  Location:  [facility];  Service: Plastics;  Laterality: Right;    INCISION AND DRAINAGE HIP Right 2/9/2021    Procedure: HIP INCISION AND DRAINAGE;  Surgeon: Nino Carlson MD;  Location:  [facility];  Service: Orthopedics;  Laterality: Right;    INCISION AND DRAINAGE LEG Right 10/24/2021    Procedure: INCISION AND DRAINAGE LOWER EXTREMITY;  Surgeon: Amadeo Turner MD;  Location:  [facility];  Service: Plastics;  Laterality: Right;    INCISION AND DRAINAGE OF WOUND Right 7/8/2021    Procedure: INCISION AND DRAINAGE WOUND RIGHT HIP;  Surgeon: James Huntley MD;  Location:  [facility];  Service: Orthopedics;  Laterality: Right;    JOINT REPLACEMENT      KYPHOPLASTY WITH BIOPSY Bilateral 10/26/2021    Procedure: THOARCIC 12 KYPHOPLASTY WITH BIOPSY;  Surgeon: Bandar Shea MD;  Location:  [facility];  Service: Neurosurgery;  Laterality: Bilateral;    LEG DEBRIDEMENT Right 9/14/2021    Procedure: DEBRIDEMENT OF RIGHT HIP WOUND, RIGHT GLUTEAL FASCIOCUTANEOUS ADVANCEMENT FLAP AND RIGHT TENSOR FASCIAL JESSICA FLAP;  Surgeon: Amadeo Turner MD;  Location:  [facility];  Service: Plastics;  Laterality: Right;    LUMBAR DISCECTOMY Right 3/23/2021    Procedure: LUMBAR DISCECTOMY MICRO, Lumbar 1/2 right;  Surgeon: Bandar Shea MD;  Location:  [facility];  Service: Neurosurgery;  Laterality: Right;    LUMBAR FUSION N/A 1/19/2018    Procedure: L3-4,L4-5 DECOMPRESSION, POSTERIOR SPINAL FUSION WITH INSTRUMENTATION;  Surgeon: Fortino Oropeza MD;  Location:  [facility];  Service:     LUMBAR LAMINECTOMY WITH FUSION Left 1/17/2018    Procedure: LEFT L3-4 L4-5 LATERAL LUMBAR INTERBODY FUSION;  Surgeon: Fortino Oropeza MD;  Location:  [facility];  Service:     MASS EXCISION Right 4/23/2024    Procedure: RIGHT BUTTOCK MASS EXCISION;  Surgeon: Moises Keyes MD;  Location:  [facility];  Service: General;   Laterality: Right;    MYRINGOTOMY W/ TUBES  09/04/2014    TUBES NO LONGER IN PLACE    OTHER SURGICAL HISTORY      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 so hardware was removed and spacers were placed    REPLACEMENT TOTAL KNEE Right     URETEROSCOPY LASER LITHOTRIPSY WITH STENT INSERTION N/A 6/21/2024    Procedure: URETEROSCOPY LASER LITHOTRIPSY WITH STENT INSERTION LEFT;  Surgeon: Ky Plascencia MD;  Location[facility];  Service: Urology;  Laterality: N/A;      General Information       Row Name 06/24/24 1320          OT Time and Intention    Document Type therapy note (daily note)  -CS (r) KJ (t) CS (c)     Mode of Treatment individual therapy;occupational therapy  -CS (r) KJ (t) CS (c)       Row Name 06/24/24 1320          General Information    Patient Profile Reviewed yes  -CS (r) KJ (t) CS (c)     Existing Precautions/Restrictions fall  -CS (r) KJ (t) CS (c)     Barriers to Rehab medically complex;cognitive status  -CS (r) KJ (t) CS (c)       Row Name 06/24/24 1320          Safety Issues, Functional Mobility    Impairments Affecting Function (Mobility) cognition;endurance/activity tolerance  -CS (r) KJ (t) CS (c)     Cognitive Impairments, Mobility Safety/Performance attention;awareness, need for assistance;insight into deficits/self-awareness;problem-solving/reasoning;safety precaution awareness  -CS (r) KJ (t) CS (c)               User Key  (r) = Recorded By, (t) = Taken By, (c) = Cosigned By      Initials Name Provider Type    CS Molly Linder S, OTR/L, CNT Occupational Therapist    Arabella Patel OT Student OT Student                     Mobility/ADL's       Row Name 06/24/24 1320          Transfers    Comment, (Transfers) did not attempt due to pt safety secondary to cognitive deficits.  -CS (r) KJ (t) CS (c)       Row Name 06/24/24 1320          Functional Mobility    Functional Mobility- Comment did not attempt due to pt safety secondary to cognitive deficits.  -CS (r) KJ (t) CS (c)       Row Name  06/24/24 1320          Activities of Daily Living    BADL Assessment/Intervention feeding  -CS (r) KJ (t) CS (c)       Row Name 06/24/24 1320          Self-Feeding Assessment/Training    Tyler Level (Feeding) liquids to mouth;scoop food and bring to mouth;moderate assist (50% patient effort)  -CS (r) KJ (t) CS (c)     Assistive Devices (Feeding) hand over hand;other (see comments)  -CS (r) KJ (t) CS (c)     Position (Self-Feeding) supine  -CS (r) KJ (t) CS (c)     Comment, (Feeding) intermittent hand over hand  -CS (r) KJ (t) CS (c)               User Key  (r) = Recorded By, (t) = Taken By, (c) = Cosigned By      Initials Name Provider Type    CS Molly Linder, OTR/L, CNT Occupational Therapist    KJ Arabella Torres, OT Student OT Student                   Obj/Interventions       Row Name 06/24/24 1320          Shoulder (Therapeutic Exercise)    Shoulder (Therapeutic Exercise) AAROM (active assistive range of motion)  -CS (r) KJ (t) CS (c)     Shoulder AAROM (Therapeutic Exercise) 10 repetitions;bilateral;flexion  -CS (r) KJ (t) CS (c)       Row Name 06/24/24 1320          Elbow/Forearm (Therapeutic Exercise)    Elbow/Forearm (Therapeutic Exercise) AAROM (active assistive range of motion)  -CS (r) KJ (t) CS (c)     Elbow/Forearm AAROM (Therapeutic Exercise) 10 repetitions;bilateral;extension;flexion  -CS (r) KJ (t) CS (c)       Row Name 06/24/24 1320          Hand (Therapeutic Exercise)    Hand (Therapeutic Exercise) AROM (active range of motion)  -CS (r) KJ (t) CS (c)     Hand AROM/AAROM (Therapeutic Exercise) AAROM (active assistive range of motion);AROM (active range of motion);bilateral;finger flexion;finger extension;10 repetitions  -CS (r) KJ (t) CS (c)       Row Name 06/24/24 1320          Motor Skills    Therapeutic Exercise shoulder;hand;elbow/forearm  -CS (r) KJ (t) CS (c)               User Key  (r) = Recorded By, (t) = Taken By, (c) = Cosigned By      Initials Name Provider Type    CS Kailash,  Molly VELASCO OTR/L, MARI Occupational Therapist    Arabella Patel, OT Student OT Student                   Goals/Plan    No documentation.                  Clinical Impression       Row Name 06/24/24 1320          Pain Assessment    Pain Intervention(s) Medication (See MAR);Repositioned  -CS (r) KJ (t) CS (c)       Row Name 06/24/24 1320          Pain Scale: FACES Pre/Post-Treatment    Pain: FACES Scale, Pretreatment 0-->no hurt  -CS (r) KJ (t) CS (c)     Posttreatment Pain Rating 0-->no hurt  -CS (r) KJ (t) CS (c)       Row Name 06/24/24 1320          Plan of Care Review    Progress no change  -CS (r) KJ (t) CS (c)     Outcome Evaluation OT session completed. Pt was alert to therapist arrival however did not respond to questions or commands. Pt performed feeding Max A hand over hand with regular utentils. Pt intermittently would nod head yes/no to certain foods.  Pt completed AAROM BUE therapeutic exercises x10 trials to promote BUE strength/ROM. Pt required max cueing for attention to task, task completion, and task initiation. It is anticipated pt would require Max A hand over hand assistance for grooming tasks. Pt would benefit from SNF placement. Continue OT POC.  -CS (r) KJ (t) CS (c)       Row Name 06/24/24 1320          Positioning and Restraints    Pre-Treatment Position in bed  -CS (r) KJ (t) CS (c)     Post Treatment Position bed  -CS (r) KJ (t) CS (c)     In Bed call light within reach;encouraged to call for assist;supine;with family/caregiver  -CS (r) KJ (t) CS (c)               User Key  (r) = Recorded By, (t) = Taken By, (c) = Cosigned By      Initials Name Provider Type    Molly Durbin, OTR/L, MARI Occupational Therapist    Arabella Patel, OT Student OT Student                   Outcome Measures       Row Name 06/24/24 1320          How much help from another is currently needed...    Putting on and taking off regular lower body clothing? 1  -CS (r) KJ (t) CS (c)     Bathing  (including washing, rinsing, and drying) 2  -CS (r) KJ (t) CS (c)     Toileting (which includes using toilet bed pan or urinal) 2  -CS (r) KJ (t) CS (c)     Putting on and taking off regular upper body clothing 2  -CS (r) KJ (t) CS (c)     Taking care of personal grooming (such as brushing teeth) 2  -CS (r) KJ (t) CS (c)     Eating meals 2  -CS (r) KJ (t) CS (c)     AM-PAC 6 Clicks Score (OT) 11  -CS (r) KJ (t)       Row Name 06/24/24 1400          How much help from another person do you currently need...    Turning from your back to your side while in flat bed without using bedrails? 1  -KJA     Moving from lying on back to sitting on the side of a flat bed without bedrails? 1  -KJA     Moving to and from a bed to a chair (including a wheelchair)? 1  -KJA     Standing up from a chair using your arms (e.g., wheelchair, bedside chair)? 1  -KJA     Climbing 3-5 steps with a railing? 1  -KJA     To walk in hospital room? 1  -KJA     AM-PAC 6 Clicks Score (PT) 6  -KJA     Highest Level of Mobility Goal 2 --> Bed activities/dependent transfer  -KJA       Row Name 06/24/24 1400          Functional Assessment    Outcome Measure Options AM-PAC 6 Clicks Basic Mobility (PT)  -KJA               User Key  (r) = Recorded By, (t) = Taken By, (c) = Cosigned By      Initials Name Provider Type    KJEmilee Mclaughlin, PTA Physical Therapist Assistant    Molly Durbin S, OTR/L, CNT Occupational Therapist    Arabella Patel, OT Student OT Student                    Occupational Therapy Education       Title: PT OT SLP Therapies (Done)       Topic: Occupational Therapy (Done)       Point: ADL training (Done)       Description:   Instruct learner(s) on proper safety adaptation and remediation techniques during self care or transfers.   Instruct in proper use of assistive devices.                  Learning Progress Summary             Patient Acceptance, E, VU by KS at 6/20/2024 7535    Acceptance, E, VU by EC at 6/20/2024 1339     Acceptance, E, VU by EC at 6/17/2024 1525   Caregiver Acceptance, E, VU by EC at 6/20/2024 1558                         Point: Home exercise program (Done)       Description:   Instruct learner(s) on appropriate technique for monitoring, assisting and/or progressing therapeutic exercises/activities.                  Learning Progress Summary             Patient Acceptance, E, VU by KS at 6/20/2024 1715    Acceptance, E, VU by EC at 6/20/2024 1558   Caregiver Acceptance, E, VU by EC at 6/20/2024 1558                         Point: Precautions (Done)       Description:   Instruct learner(s) on prescribed precautions during self-care and functional transfers.                  Learning Progress Summary             Patient Acceptance, E, VU by KS at 6/20/2024 1715    Acceptance, E, VU,NR by  at 6/19/2024 1136    Acceptance, E, VU by EC at 6/17/2024 1525                         Point: Body mechanics (Done)       Description:   Instruct learner(s) on proper positioning and spine alignment during self-care, functional mobility activities and/or exercises.                  Learning Progress Summary             Patient Acceptance, E, VU by KS at 6/20/2024 1715    Acceptance, E, VU by EC at 6/20/2024 1558    Acceptance, E, VU,NR by  at 6/19/2024 1136    Acceptance, E, VU by EC at 6/17/2024 1525   Caregiver Acceptance, E, VU by EC at 6/20/2024 1558                                         User Key       Initials Effective Dates Name Provider Type Discipline    KS 02/27/23 -  Ewelina Morales RN Registered Nurse Nurse     06/20/22 -  Bianca Mcgarry, OTR/L Occupational Therapist OT    EC 10/13/23 -  Ashley Martino OTR/L Occupational Therapist OT                  OT Recommendation and Plan     Plan of Care Review  Progress: no change  Outcome Evaluation: OT session completed. Pt was alert to therapist arrival however did not respond to questions or commands. Pt performed feeding Max A hand over hand with regular  utentils. Pt intermittently would nod head yes/no to certain foods.  Pt completed AAROM BUE therapeutic exercises x10 trials to promote BUE strength/ROM. Pt required max cueing for attention to task, task completion, and task initiation. It is anticipated pt would require Max A hand over hand assistance for grooming tasks. Pt would benefit from SNF placement. Continue OT POC.     Time Calculation:         Time Calculation- OT       Row Name 06/24/24 1320             Time Calculation- OT    OT Start Time 1300  -CS (r) KJ (t) CS (c)      OT Stop Time 1327  -CS (r) KJ (t) CS (c)      OT Time Calculation (min) 27 min  -CS (r) KJ (t)      Total Timed Code Minutes- OT 27 minute(s)  -CS (r) KJ (t) CS (c)         Timed Charges    88209 - OT Therapeutic Exercise Minutes 9  -CS (r) KJ (t) CS (c)      94684 - OT Self Care/Mgmt Minutes 18  -CS (r) KJ (t) CS (c)         Total Minutes    Timed Charges Total Minutes 27  -CS (r) KJ (t)       Total Minutes 27  -CS (r) KJ (t)                User Key  (r) = Recorded By, (t) = Taken By, (c) = Cosigned By      Initials Name Provider Type    CS Molly Linder, OTR/L, CNT Occupational Therapist    Arabella Patel, OT Student OT Student                           Arabella Torres, OT Student  6/24/2024

## 2024-06-24 NOTE — PLAN OF CARE
Goal Outcome Evaluation:           Progress: no change  Outcome Evaluation: OT session completed. Pt was alert to therapist arrival however did not respond to questions or commands. Pt performed feeding Max A hand over hand with regular utentils. Pt intermittently would nod head yes/no to certain foods.  Pt completed AAROM BUE therapeutic exercises x10 trials to promote BUE strength/ROM. Pt required max cueing for attention to task, task completion, and task initiation. It is anticipated pt would require Max A hand over hand assistance for grooming tasks. Pt would benefit from SNF placement. Continue OT POC.

## 2024-06-24 NOTE — PROGRESS NOTES
LOS: 3 days   Patient Care Team:  Moises Oliveira MD as PCP - General (Internal Medicine)  Moises Holman MD as Consulting Physician (Otolaryngology)  Christiano Rao PA as Physician Assistant (Otolaryngology)  Candelaria David MD as Obstetrician (Obstetrics and Gynecology)  Omar Hernandez MD as Cardiologist (Cardiology)  Leta Crawford APRN as Nurse Practitioner (Nurse Practitioner)  Leta Crawford APRN as Nurse Practitioner (Nurse Practitioner)    Chief Complaint:  recurrent UTI with obstructing ureteral stones/p URS/Laser    Subjective     Interval History:     Patient isn't speaking but answering yes/no questions with head nods. Daughter at bedside.     Patient feeling a little better.    Had URS with laser and stent with [facility] three days ago. Plan to remove stent via string mid week.     Has cholecystectomy planned for tomorrow, per chart review.     Review of Systems  Pertinent items are noted in HPI     Objective     Vital Signs  Temp:  [97.6 °F (36.4 °C)-98.9 °F (37.2 °C)] 98.9 °F (37.2 °C)  Heart Rate:  [80-95] 90  Resp:  [14-18] 18  BP: (137-163)/(56-80) 163/69    Physical Exam:  CBI connected to 3-way kay but CBI if off. Kay output dark yellow with some mild debris.     Data Review:       I have reviewed the following data:    CBC (No Diff) (06/24/2024 04:37)    Basic Metabolic Panel (06/24/2024 04:37)    Urine Culture - Urine, Urine, Catheter (06/21/2024 11:16)    Urine Culture - Urine, Urine, Clean Catch (06/16/2024 12:19)    Progress Notes by Ky Plascencia MD (06/23/2024 11:52)    Op Note by Ky Plascencia MD (06/21/2024 11:15)    Medication Review:     Current Facility-Administered Medications:     acetaminophen (TYLENOL) tablet 650 mg, 650 mg, Oral, Q6H PRN, Ky Placsencia MD, 650 mg at 06/19/24 1403    ascorbic acid (VITAMIN C) tablet 500 mg, 500 mg, Oral, Daily, Ky Plascencia MD, 500 mg at 06/23/24 0826    aspirin EC tablet 81 mg, 81 mg, Oral, Daily, Temo,  Ky BECKMAN MD, 81 mg at 06/20/24 0958    sennosides-docusate (PERICOLACE) 8.6-50 MG per tablet 2 tablet, 2 tablet, Oral, BID PRN **AND** polyethylene glycol (MIRALAX) packet 17 g, 17 g, Oral, Daily PRN, 17 g at 06/15/24 1553 **AND** bisacodyl (DULCOLAX) EC tablet 5 mg, 5 mg, Oral, Daily PRN **AND** bisacodyl (DULCOLAX) suppository 10 mg, 10 mg, Rectal, Daily PRN, Ky Plascencia MD    Calcium Replacement - Follow Nurse / BPA Driven Protocol, , Does not apply, PRN, Ky Plascencia MD    carvedilol (COREG) tablet 25 mg, 25 mg, Oral, BID With Meals, Ky Plascencia MD, 25 mg at 06/23/24 1711    cefepime 2000 mg IVPB in 100 mL NS (MBP), 2,000 mg, Intravenous, Q8H, Elie Copeland MD, 2,000 mg at 06/24/24 0139    Diclofenac Sodium (VOLTAREN) 1 % gel 4 g, 4 g, Topical, 4x Daily PRN, Ky Plascencia MD    DULoxetine (CYMBALTA) DR capsule 60 mg, 60 mg, Oral, Daily, Ky Plascencia MD, 60 mg at 06/23/24 0826    estradiol (ESTRACE) vaginal cream 2 applicator, 2 g, Vaginal, Daily, Ky Plascencia MD, 2 applicator at 06/23/24 0826    ferrous sulfate tablet 325 mg, 325 mg, Oral, Daily With Breakfast, Ky Plascencia MD, 325 mg at 06/23/24 0826    folic acid (FOLVITE) tablet 1,000 mcg, 1,000 mcg, Oral, Daily, Ky Plascencia MD, 1,000 mcg at 06/23/24 0825    furosemide (LASIX) tablet 40 mg, 40 mg, Oral, Daily, Ky Plascencia MD, 40 mg at 06/23/24 0826    isosorbide mononitrate (IMDUR) 24 hr tablet 60 mg, 60 mg, Oral, Daily, Ky Plascencia MD, 60 mg at 06/23/24 0825    leflunomide (ARAVA) tablet 20 mg, 20 mg, Oral, Daily, Ky Plascencia MD, 20 mg at 06/23/24 0826    levothyroxine (SYNTHROID, LEVOTHROID) tablet 75 mcg, 75 mcg, Oral, Q AM, Ky Plascencia MD, 75 mcg at 06/24/24 0647    Lidocaine 4 % 1 patch, 1 patch, Transdermal, Daily PRN, Ky Plascencia MD    losartan (COZAAR) tablet 50 mg, 50 mg, Oral, Daily, Ky Plascencia MD, 50 mg at 06/20/24 0958    Magnesium Low Dose Replacement - Follow Nurse  / BPA Driven Protocol, , Does not apply, PRTemo NIELSON Donald L, MD    methenamine (HIPREX) tablet 1 g, 1 g, Oral, BID, Ky Plascencia MD, 1 g at 06/23/24 2002    metroNIDAZOLE (FLAGYL) IVPB 500 mg, 500 mg, Intravenous, Q8H, Elie Copeland MD, Last Rate: 200 mL/hr at 06/24/24 0109, 500 mg at 06/24/24 0109    multivitamin with minerals 1 tablet, 1 tablet, Oral, Daily, Ky Plascencia MD, 1 tablet at 06/23/24 0825    [DISCONTINUED] Morphine sulfate (PF) injection 1 mg, 1 mg, Intravenous, Q4H PRN **AND** naloxone (NARCAN) injection 0.4 mg, 0.4 mg, Intravenous, Q5 Min PRN, Ky Plascencia MD    nitroglycerin (NITROSTAT) SL tablet 0.4 mg, 0.4 mg, Sublingual, Q5 Min PRN, Ky Plascencia MD    ondansetron (ZOFRAN) injection 4 mg, 4 mg, Intravenous, Q6H PRN, Ky Plascencia MD, 4 mg at 06/23/24 2002    pantoprazole (PROTONIX) EC tablet 40 mg, 40 mg, Oral, Daily, Ky Plascencia MD, 40 mg at 06/20/24 0958    Phosphorus Replacement - Follow Nurse / BPA Driven Protocol, , Does not apply, Temo PALACIO Donald L, MD    Potassium Replacement - Follow Nurse / BPA Driven Protocol, , Does not apply, PRTemo NIELSON Donald L, MD    sodium chloride 0.9 % flush 10 mL, 10 mL, Intravenous, PRN, Ky Plascencia MD, 10 mL at 06/12/24 2200    sodium chloride 0.9 % flush 10 mL, 10 mL, Intravenous, Q12H, Ky Plascencia MD, 10 mL at 06/23/24 2002    sodium chloride 0.9 % flush 10 mL, 10 mL, Intravenous, PRNTemo Donald L, MD    sodium chloride 0.9 % infusion 40 mL, 40 mL, Intravenous, PRN, Ky Plascencia MD    spironolactone (ALDACTONE) tablet 25 mg, 25 mg, Oral, Daily, Ky Plascencia MD, 25 mg at 06/23/24 0826    traMADol (ULTRAM) tablet 50 mg, 50 mg, Oral, Q12H PRN, Ky Plascencia MD, 50 mg at 06/23/24 2002    Assessment and Plan:    Left ureter stone: POD#3 s/p S/P URS/laser/stent. Suggest removing stent and replacing Horan with tomorrow's planned cholecystectomy.     Recurrent UTI: ID following. Defer current  management to them. Recurrences could be from stone - will need to re-evaluate now that stone has been addressed.     URO DISPO: Urology to continue to follow this patient.     I discussed the patient's findings and my recommendations with patient and family    (Please note that portions of this note were completed with a voice recognition program.)    Albaro Dixon MD  06/24/24  08:47 CDT    Time: Time spent: 20 minutes spent performing evaluation and management, chart review, and discussion with patient, > 50% of time spent in face-to-face encounter

## 2024-06-24 NOTE — PROGRESS NOTES
[facility] Medicine Services  INPATIENT PROGRESS NOTE    Patient Name: Tawny Shin  Date of Admission: 6/12/2024  Today's Date: 06/24/24  Length of Stay: 3  Primary Care Physician: Moises Oliveira MD    Subjective   Chief Complaint: Recurrent UTI, gallstone, kidney stone   HPI   Patient was again confused this morning and had difficulty receiving medications from nursing.  As the morning progressed she has shown some improvement is smiling and responding with simple phrases and understanding context.    Review of Systems   Constitutional:  Positive for activity change, appetite change and fatigue. Negative for chills and fever.   HENT:  Negative for hearing loss, nosebleeds, tinnitus and trouble swallowing.    Eyes:  Negative for visual disturbance.   Respiratory:  Negative for cough, chest tightness, shortness of breath and wheezing.    Cardiovascular:  Negative for chest pain, palpitations and leg swelling.   Gastrointestinal:  Negative for abdominal distention, abdominal pain, blood in stool, constipation, diarrhea, nausea and vomiting.   Endocrine: Negative for cold intolerance, heat intolerance, polydipsia, polyphagia and polyuria.   Genitourinary:  Negative for decreased urine volume, difficulty urinating, dysuria, flank pain, frequency and hematuria.   Musculoskeletal:  Positive for arthralgias, gait problem and myalgias. Negative for joint swelling.   Skin:  Negative for rash.   Allergic/Immunologic: Negative for immunocompromised state.   Neurological:  Positive for weakness. Negative for dizziness, syncope, light-headedness and headaches.   Hematological:  Negative for adenopathy. Does not bruise/bleed easily.   All pertinent negatives and positives are as above. All other systems have been reviewed and are negative unless otherwise stated.     Objective    Temp:  [97.6 °F (36.4 °C)-98.9 °F (37.2 °C)] 98.3 °F (36.8 °C)  Heart Rate:  [80-90] 80  Resp:  [14-18]  18  BP: (101-163)/(50-69) 101/50  Physical Exam  Vitals and nursing note reviewed.   Constitutional:       Comments: Chronically ill.  Deconditioning.   HENT:      Head: Normocephalic.   Eyes:      Conjunctiva/sclera: Conjunctivae normal.      Pupils: Pupils are equal, round, and reactive to light.   Cardiovascular:      Rate and Rhythm: Normal rate and regular rhythm.      Heart sounds: Normal heart sounds.   Pulmonary:      Effort: Pulmonary effort is normal. No respiratory distress.      Breath sounds: Normal breath sounds.   Abdominal:      General: Bowel sounds are normal. There is no distension.      Palpations: Abdomen is soft.      Comments: Obesity.  No tenderness noted with palpation.   Musculoskeletal:         General: No swelling.      Cervical back: Neck supple.   Skin:     General: Skin is warm and dry.      Capillary Refill: Capillary refill takes 2 to 3 seconds.      Findings: No rash.   Neurological:      Mental Status: She is alert and oriented to person, place, and time.      Motor: Weakness present.      Coordination: Coordination abnormal.      Gait: Gait abnormal.   Psychiatric:         Mood and Affect: Mood normal.         Behavior: Behavior normal.         Thought Content: Thought content normal.          Results Review:  I have reviewed the labs, radiology results, and diagnostic studies.    Laboratory Data:   Results from last 7 days   Lab Units 06/24/24  0437 06/23/24 0428 06/22/24  0453   WBC 10*3/mm3 4.74 5.03 6.21   HEMOGLOBIN g/dL 7.8* 7.7* 8.1*   HEMATOCRIT % 26.1* 26.0* 27.2*   PLATELETS 10*3/mm3 175 170 185        Results from last 7 days   Lab Units 06/24/24  0437 06/23/24  0428 06/22/24  0453   SODIUM mmol/L 138 139 138   POTASSIUM mmol/L 3.8 3.8 4.2   CHLORIDE mmol/L 105 102 103   CO2 mmol/L 25.0 25.0 28.0   BUN mg/dL 12 14 16   CREATININE mg/dL 0.80 0.86 0.93   CALCIUM mg/dL 8.6 8.6 9.1   BILIRUBIN mg/dL  --   --  0.4   ALK PHOS U/L  --   --  81   ALT (SGPT) U/L  --   --  18    AST (SGOT) U/L  --   --  19   GLUCOSE mg/dL 98 90 99       Culture Data:   Urine Culture   Date Value Ref Range Status   06/21/2024 No growth  Final   06/16/2024 25,000 CFU/mL Enterobacter cloacae complex CRE (A)  Final     Comment:       Consider infectious disease consult.  Susceptibility results may not correlate to clinical outcomes.  Carbapenem resistant Enterobacteriaceae, patient may be an isolation risk.  No carbapenemase detected.       Radiology Data:   Imaging Results (Last 24 Hours)       ** No results found for the last 24 hours. **            I have reviewed the patient's current medications.     Assessment/Plan   Assessment  Active Hospital Problems    Diagnosis     **Altered mental status     Esophageal dysphagia     UTI (urinary tract infection)     Hydronephrosis with urinary obstruction due to ureteral calculus     Acute cholecystitis     Hypothyroidism     Stage 3b chronic kidney disease     Near functional paraplegia     Cholelithiasis     Essential hypertension     Sick sinus syndrome     Coronary artery disease involving native coronary artery of native heart without angina pectoris     Venous insufficiency     Rheumatoid arthritis involving multiple sites     Chronic heart failure with preserved ejection fraction     Class 1 obesity due to excess calories with serious comorbidity and body mass index (BMI) of 33.0 to 33.9 in adult     Functional neurological symptom disorder with weakness or paralysis     Chronic anticoagulation     Chronic embolism and thrombosis of unspecified deep veins of left lower extremity        Treatment Plan  Recurrent UTI/neurogenic bladder.  Cefepime/Flagyl antibiotics.  Continue HipRex.  Repeated UA, negative for bacteria.  Consult ID.  Urology consult.  CT scan abdomen pelvic . Obstructive uropathy on the left with mild to moderate dilatation of the upper tracts of the left kidney and left ureter into the true pelvis where there is a 5 x 6 mm calculus within  the left ureter approximately 4 to 5 cm above the UVJ- right ureter is decompressed and normal in appearance- No evidence of renal mass. No perinephric fluid collection present, gallbladder is mildly distended- gallstones within the gallbladder neck,  No pericholecystic inflammatory changes or biliary dilatation, Constipation,  No obstruction or free air, Normal appendix, Calcified fibroid within the lower uterine segment, Mild constipation, No obstruction or free air, Normal appendix, Small fat-containing periumbilical hernia, Previous kyphoplasty at T12, Previous fusion at L3-L4 and L4-L5, Left basilar atelectasis..  Recommendation from urology-evaluation of neurogenic bladder at a university setting like [place].     Obstructing renal calculi left side/moderate dilated of left ureter.  Callus 5 x 6 mm and left 4 to 5 cm above the UVJ junction.  Status post 6/21/2024 urethroscope laser lithotripsy with stent insertion left. Horan in place.     Gallstone, within the gallbladder neck/dysfunctional gallbladder.  Mild distended gallbladder.  Right upper quadrant tenderness.  Ultrasound of the gallbladder-Cholelithiasis with small shadowing stones and sludge within the region of the gallbladder neck- Moderate distention of the gallbladder- however no gallbladder wall thickening or pericholecystic fluid identified- No biliary dilatation.  HIDA scan-Nonvisualization of the gallbladder on images obtained up to 90 minutes- Cystic duct obstruction and acute cholecystitis cannot be excluded on the basis of this exam,  No evidence of common bile duct obstruction with emptying of activity into the C-loop of the duodenum and proximal jejunum, Gallstones and sludge were demonstrated on recent ultrasound- combined with nonvisualization of the gallbladder exclude performance of an ejection fraction..  Discussed with general surgery Dr. Jo-plan for gallbladder removal this coming Tuafiaday, DC Eliquis today 6/22/2024 start  Lovenox today, nursing communication to stop Lovenox 24 hours before surgery on Monday.  Dr. Jo also recommended cardiac clearance for surgery.  I put in cardiac consult today.  Cardiology states okay to DC Lovenox today per Dr. Jo.  Will DC Lovenox today.  Will consider delaying surgery depending on mental status progression.     Reflux/esophageal dysphagia. Dysphagia recent esophageal stretching, GI evaluated and recommended to continue outpatient follow-up.  SLP evaluated and recommended to continue regular diet.  Protonix . Zofran as needed.     Altered mental status/encephalopathy.  CT scan the head-No acute intracranial abnormality, Chronic sinusitis and left mastoid effusion.  Chest x-ray-Stable chest exam without acute process, No visualized infiltrate.   Patient is on room air.     Hypothyroidism.  Synthroid.     Chronic stage IIIb renal failure.  Creatinine normalized.     Hypomagnesia. Resolved.     Hyponatremia.  Resolved.     Hypokalemia.  Resolved.  Magnesium normal.     Hypertension/sick sinus syndrome/venous insufficiency/CHF.  Eliquis.  Aspirin . Coreg . Lasix.  Imdur.  Cozaar . Aldactone.  Nitro as needed.     Right gluteal wound.  Consult wound care.     Rheumatoid arthritis. Arava.     Depression/anxiety.  Cymbalta.     Postmenopausal.  Estradiol cream.     Anemia.  Hemoglobin decreased.  Iron sulfate.  Folic acid.  No sign of acute bleed.     Pain . lidocaine patch.  Voltaren gel.  Ultram as needed.     Chronic DVT.  DC Eliquis due to possible surgery, on Lovenox.    Functional neurological symptom disorder with paraplegia.     Nutrition.  Cardiac diet.  Vitamin C.  Multivitamins.  Regular/house diet.  Boost supplement.     Deconditioning.  PT and OT consult.  Patient use a walker and bedbound at baseline.     Urine culture-100,000 CFU/mL Enterobacter cloacae complex  25,000 CFU/mL Enterobacter cloacae complex CRE   Blood culture-no growth for 5 days.     Dr. Shin's mom.  Patient  already have home health at home.  Patient request for lift at discharge.     Medical Decision Making  Number and Complexity of problems: Recurrent UTI/altered mental status/reflux/rheumatoid arthritis/chronic 3B renal failure  Differential Diagnosis: None     Conditions and Status        Condition is unchanged.     [facility] Data  External documents reviewed: Previous note .  Cardiac tracing (EKG, telemetry) interpretation: Sinus.  Radiology interpretation: X-ray/CT scan of the head  Labs reviewed: Laboratory  Any tests that were considered but not ordered: Lab in a.m.     Decision rules/scores evaluated (example [id number], Wells, etc): None     Discussed with: Patient and family     Care Planning  Shared decision making: Patient and family  Code status and discussions: DNR     Disposition  Social Determinants of Health that impact treatment or disposition: From home  1 to 3 days.    Electronically signed by Kris Cade MD, 06/24/24, 15:26 CDT.

## 2024-06-24 NOTE — PROGRESS NOTES
INFECTIOUS DISEASES PROGRESS NOTE    Patient:  Tawny Shin  YOB: 1955  MRN: [id number]   Admit date: 6/12/2024   Admitting Physician: Kris Cade,*  Primary Care Physician: Moises Oliveira MD    Chief Complaint: None obtained from patient as she was sleepy    Interval History: Patient's caregiver and family at bedside.  They noted that patient worked with therapy and has been quite tired since then.    They understand plans are for cholecystectomy tomorrow.      Allergies:   Allergies   Allergen Reactions    Atorvastatin Other (See Comments)     LEG CRAMPS      Amoxicillin Rash    Escitalopram Rash    Nabumetone Rash    Niacin Er Rash    Penicillin G Rash    Penicillins Rash    Simvastatin Rash       Current Scheduled Medications:   ascorbic acid, 500 mg, Oral, Daily  aspirin, 81 mg, Oral, Daily  carvedilol, 25 mg, Oral, BID With Meals  cefepime, 2,000 mg, Intravenous, Q8H  DULoxetine, 60 mg, Oral, Daily  estradiol, 2 g, Vaginal, Daily  ferrous sulfate, 325 mg, Oral, Daily With Breakfast  folic acid, 1,000 mcg, Oral, Daily  furosemide, 40 mg, Oral, Daily  isosorbide mononitrate, 60 mg, Oral, Daily  leflunomide, 20 mg, Oral, Daily  levothyroxine, 75 mcg, Oral, Q AM  losartan, 50 mg, Oral, Daily  methenamine, 1 g, Oral, BID  metroNIDAZOLE, 500 mg, Intravenous, Q8H  multivitamin with minerals, 1 tablet, Oral, Daily  pantoprazole, 40 mg, Oral, Daily  sodium chloride, 10 mL, Intravenous, Q12H  spironolactone, 25 mg, Oral, Daily      Current PRN Medications:    acetaminophen    senna-docusate sodium **AND** polyethylene glycol **AND** bisacodyl **AND** bisacodyl    Calcium Replacement - Follow Nurse / BPA Driven Protocol    Diclofenac Sodium    Lidocaine    Magnesium Low Dose Replacement - Follow Nurse / BPA Driven Protocol    [DISCONTINUED] Morphine **AND** naloxone    nitroglycerin    ondansetron    Phosphorus Replacement - Follow Nurse / BPA Driven Protocol    Potassium Replacement -  "Follow Nurse / BPA Driven Protocol    sodium chloride    sodium chloride    sodium chloride    traMADol              Objective     Vital Signs:  Temp (24hrs), Av.3 °F (36.8 °C), Min:97.6 °F (36.4 °C), Max:98.9 °F (37.2 °C)      /57 (BP Location: Right arm, Patient Position: Lying)   Pulse 84   Temp 98 °F (36.7 °C) (Axillary)   Resp 16   Ht 167.6 cm (66\")   Wt 94.8 kg (209 lb)   LMP  (LMP Unknown)   SpO2 94%   BMI 33.73 kg/m²         Physical Exam:    General: Patient's lying in bed appearing very comfortable sleeping  Respiratory: Effort even and unlabored  Abdomen: Soft, some mild right sided tenderness without rebound or guarding        Results Review:    I reviewed the patient's new clinical results.    Lab Results:    CBC:   Lab Results   Lab 24  0418 24  0122 24  0354 24  0428 24  0453 24  0428 24  0437   WBC 6.75 6.69 6.32 6.22 6.21 5.03 4.74   HEMOGLOBIN 9.7* 7.4* 7.5* 7.8* 8.1* 7.7* 7.8*   HEMATOCRIT 32.4* 25.3* 25.2* 25.4* 27.2* 26.0* 26.1*   PLATELETS 174 168 192 200 185 170 175        AutoDiff:            Manual Diff:          Invalid input(s): \"MONABS\"          CMP:   Lab Results   Lab 24  0453 24  0428 24  0437   SODIUM 138 139 138   POTASSIUM 4.2 3.8 3.8   CHLORIDE 103 102 105   CO2 28.0 25.0 25.0   BUN 16 14 12   CREATININE 0.93 0.86 0.80   CALCIUM 9.1 8.6 8.6   BILIRUBIN 0.4  --   --    ALK PHOS 81  --   --    ALT (SGPT) 18  --   --    AST (SGOT) 19  --   --    GLUCOSE 99 90 98       Estimated Creatinine Clearance: 75.9 mL/min (by C-G formula based on SCr of 0.8 mg/dL).    Culture Results:    Microbiology Results (last 10 days)       Procedure Component Value - Date/Time    Urine Culture - Urine, Urine, Catheter [[id number]]  (Normal) Collected: 24 1116    Lab Status: Final result Specimen: Urine, Catheter Updated: 24 1152     Urine Culture No growth    Urine Culture - Urine, Urine, Clean Catch [[id number]]  " (Abnormal)  (Susceptibility) Collected: 06/16/24 1219    Lab Status: Final result Specimen: Urine, Clean Catch Updated: 06/19/24 0936     Urine Culture 25,000 CFU/mL Enterobacter cloacae complex CRE     Comment:   Consider infectious disease consult.  Susceptibility results may not correlate to clinical outcomes.  Carbapenem resistant Enterobacteriaceae, patient may be an isolation risk.  No carbapenemase detected.       Narrative:      Colonization of the urinary tract without infection is common. Treatment is discouraged unless the patient is symptomatic, pregnant, or undergoing an invasive urologic procedure.    Susceptibility        Enterobacter cloacae complex CRE      LICHA      Cefepime Susceptible      Ceftazidime Resistant      Ceftriaxone Resistant      Gentamicin Susceptible      Imipenem Intermediate      Levofloxacin Resistant      Meropenem Resistant      Nitrofurantoin Resistant      Piperacillin + Tazobactam Resistant      Trimethoprim + Sulfamethoxazole Resistant                                        Radiology:         Imaging Results (Last 72 Hours)       Procedure Component Value Units Date/Time    NM HIDA SCAN WITHOUT PHARMACOLOGICAL INTERVENTION [[id number]] Collected: 06/22/24 1553     Updated: 06/22/24 1559    Narrative:      EXAMINATION: NM HIDA SCAN WITHOUT PHARMACOLOGICAL INTERVENTION-   6/22/2024 3:53 PM     HISTORY: Abdominal pain. Gallstones.     Radiopharmaceutical: Tc99m Hepatolite 5.20 mCi, IV.     Following intravenous injection of 5.2 mCi mebrofenin and multiple  planar images were obtained of the upper abdomen. This demonstrates  normal accumulation of activity within the hepatic parenchyma with  biliary activity identified 15 to 20 minutes post injection. There is no  evidence of common bile duct obstruction with activity emptied from the  common duct into the C-loop of the duodenum and proximal jejunum. There  is no visualization of the gallbladder at 60 minutes. An  additional  delayed image of 90 minutes also demonstrates nonvisualization of the  gallbladder.        Impression  1. Nonvisualization of the gallbladder on images obtained up to 90  minutes. Cystic duct obstruction and acute cholecystitis cannot be  excluded on the basis of this exam.  2. No evidence of common bile duct obstruction with emptying of activity  into the C-loop of the duodenum and proximal jejunum.  3. Gallstones and sludge were demonstrated on recent ultrasound. This  combined with nonvisualization of the gallbladder exclude performance of  an ejection fraction..           This report was signed and finalized on 6/22/2024 3:55 PM by Dr. Neymar Calderon MD.       US Gallbladder [[id number]] Collected: 06/22/24 1037     Updated: 06/22/24 1043    Narrative:      US GALLBLADDER-     HISTORY: Quadrant pain, gallstones     COMPARISON: CT abdomen pelvis 6/20/2024     FINDINGS: Sonographic imaging of the right upper quadrant is performed.     Images of the pancreas are unremarkable. The proximal and mid abdominal  aorta are normal in caliber. Distal aorta is obscured by overlying  shadowing bowel gas. Proximal IVC is patent.     No focal liver mass is identified. Appropriate directional flow within  the main portal vein. Gallbladder is moderately distended containing  small shadowing stones and sludge within the region of the gallbladder  neck. No gallbladder wall thickening or pericholecystic fluid is  visualized. Common bile duct is normal in caliber measuring 4 mm.     Right kidney appears normal measuring 10.9 cm in length.       Impression:      1. Cholelithiasis with small shadowing stones and sludge within the  region of the gallbladder neck. Moderate distention of the gallbladder,  however no gallbladder wall thickening or pericholecystic fluid  identified. No biliary dilatation.     This report was signed and finalized on 6/22/2024 10:39 AM by Dr. Trinity Gómez MD.                   Active  Hospital Problems    Diagnosis     **Altered mental status     Esophageal dysphagia     UTI (urinary tract infection)     Hydronephrosis with urinary obstruction due to ureteral calculus     Acute cholecystitis     Hypothyroidism     Stage 3b chronic kidney disease     Near functional paraplegia     Cholelithiasis     Essential hypertension     Sick sinus syndrome     Coronary artery disease involving native coronary artery of native heart without angina pectoris     Venous insufficiency     Rheumatoid arthritis involving multiple sites     Chronic heart failure with preserved ejection fraction     Class 1 obesity due to excess calories with serious comorbidity and body mass index (BMI) of 33.0 to 33.9 in adult     Functional neurological symptom disorder with weakness or paralysis     Chronic anticoagulation     Chronic embolism and thrombosis of unspecified deep veins of left lower extremity        IMPRESSION:  Cholelithiasis with nonvisualization of gallbladder on HIDA-plans for robotic cholecystectomy June 25  Obstructing left ureteral stone status post cystoscopy and left ureteral stent placement per Dr. 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  Susceptible to cefepime.  Right gluteal wound      RECOMMENDATION:   Continue cefepime  Continue metronidazole-added per surgery with abnormal findings of gallbladder.  Await surgical findings tomorrow   Continue local wound care to right gluteal wound         Maggie Bang MD  06/24/24  16:39 CDT

## 2024-06-25 ENCOUNTER — APPOINTMENT (OUTPATIENT)
Dept: CT IMAGING | Facility: HOSPITAL | Age: 69
DRG: 659 | End: 2024-06-25
Payer: MEDICARE

## 2024-06-25 LAB
ANION GAP SERPL CALCULATED.3IONS-SCNC: 10 MMOL/L (ref 5–15)
BUN SERPL-MCNC: 14 MG/DL (ref 8–23)
BUN/CREAT SERPL: 14.3 (ref 7–25)
CALCIUM SPEC-SCNC: 8.5 MG/DL (ref 8.6–10.5)
CHLORIDE SERPL-SCNC: 106 MMOL/L (ref 98–107)
CO2 SERPL-SCNC: 26 MMOL/L (ref 22–29)
CREAT SERPL-MCNC: 0.98 MG/DL (ref 0.57–1)
DEPRECATED RDW RBC AUTO: 63 FL (ref 37–54)
EGFRCR SERPLBLD CKD-EPI 2021: 62.2 ML/MIN/1.73
ERYTHROCYTE [DISTWIDTH] IN BLOOD BY AUTOMATED COUNT: 18.8 % (ref 12.3–15.4)
GLUCOSE SERPL-MCNC: 88 MG/DL (ref 65–99)
HCT VFR BLD AUTO: 24.6 % (ref 34–46.6)
HGB BLD-MCNC: 7.4 G/DL (ref 12–15.9)
MCH RBC QN AUTO: 28.1 PG (ref 26.6–33)
MCHC RBC AUTO-ENTMCNC: 30.1 G/DL (ref 31.5–35.7)
MCV RBC AUTO: 93.5 FL (ref 79–97)
PLATELET # BLD AUTO: 151 10*3/MM3 (ref 140–450)
PMV BLD AUTO: 11.4 FL (ref 6–12)
POTASSIUM SERPL-SCNC: 3.9 MMOL/L (ref 3.5–5.2)
RBC # BLD AUTO: 2.63 10*6/MM3 (ref 3.77–5.28)
SODIUM SERPL-SCNC: 142 MMOL/L (ref 136–145)
T4 FREE SERPL-MCNC: 1.21 NG/DL (ref 0.93–1.7)
TSH SERPL DL<=0.05 MIU/L-ACNC: 7.05 UIU/ML (ref 0.27–4.2)
WBC NRBC COR # BLD AUTO: 4.18 10*3/MM3 (ref 3.4–10.8)

## 2024-06-25 PROCEDURE — 84439 ASSAY OF FREE THYROXINE: CPT | Performed by: CLINICAL NURSE SPECIALIST

## 2024-06-25 PROCEDURE — 99222 1ST HOSP IP/OBS MODERATE 55: CPT | Performed by: CLINICAL NURSE SPECIALIST

## 2024-06-25 PROCEDURE — 85027 COMPLETE CBC AUTOMATED: CPT | Performed by: UROLOGY

## 2024-06-25 PROCEDURE — 70450 CT HEAD/BRAIN W/O DYE: CPT

## 2024-06-25 PROCEDURE — 97110 THERAPEUTIC EXERCISES: CPT

## 2024-06-25 PROCEDURE — 25010000002 CEFEPIME PER 500 MG: Performed by: INTERNAL MEDICINE

## 2024-06-25 PROCEDURE — 25010000002 METRONIDAZOLE 500 MG/100ML SOLUTION: Performed by: INTERNAL MEDICINE

## 2024-06-25 PROCEDURE — 99232 SBSQ HOSP IP/OBS MODERATE 35: CPT | Performed by: INTERNAL MEDICINE

## 2024-06-25 PROCEDURE — 84443 ASSAY THYROID STIM HORMONE: CPT | Performed by: CLINICAL NURSE SPECIALIST

## 2024-06-25 PROCEDURE — 80048 BASIC METABOLIC PNL TOTAL CA: CPT | Performed by: FAMILY MEDICINE

## 2024-06-25 PROCEDURE — 97530 THERAPEUTIC ACTIVITIES: CPT

## 2024-06-25 PROCEDURE — 25810000003 SODIUM CHLORIDE 0.9 % SOLUTION 250 ML FLEX CONT: Performed by: UROLOGY

## 2024-06-25 PROCEDURE — 99232 SBSQ HOSP IP/OBS MODERATE 35: CPT | Performed by: UROLOGY

## 2024-06-25 RX ADMIN — ESTRADIOL 2 APPLICATOR: 0.1 CREAM VAGINAL at 12:00

## 2024-06-25 RX ADMIN — METRONIDAZOLE 500 MG: 500 INJECTION, SOLUTION INTRAVENOUS at 01:36

## 2024-06-25 RX ADMIN — FUROSEMIDE 40 MG: 40 TABLET ORAL at 11:56

## 2024-06-25 RX ADMIN — METRONIDAZOLE 500 MG: 500 INJECTION, SOLUTION INTRAVENOUS at 17:25

## 2024-06-25 RX ADMIN — Medication 10 ML: at 12:02

## 2024-06-25 RX ADMIN — METHENAMINE HIPPURATE 1 G: 1000 TABLET ORAL at 11:59

## 2024-06-25 RX ADMIN — FERROUS SULFATE TAB 325 MG (65 MG ELEMENTAL FE) 325 MG: 325 (65 FE) TAB at 11:56

## 2024-06-25 RX ADMIN — TRAMADOL HYDROCHLORIDE 50 MG: 50 TABLET ORAL at 19:51

## 2024-06-25 RX ADMIN — OXYCODONE HYDROCHLORIDE AND ACETAMINOPHEN 500 MG: 500 TABLET ORAL at 11:56

## 2024-06-25 RX ADMIN — CEFEPIME 2000 MG: 2 INJECTION, POWDER, FOR SOLUTION INTRAVENOUS at 17:25

## 2024-06-25 RX ADMIN — CARVEDILOL 25 MG: 25 TABLET, FILM COATED ORAL at 11:56

## 2024-06-25 RX ADMIN — METRONIDAZOLE 500 MG: 500 INJECTION, SOLUTION INTRAVENOUS at 11:41

## 2024-06-25 RX ADMIN — Medication 1 TABLET: at 11:56

## 2024-06-25 RX ADMIN — ISOSORBIDE MONONITRATE 60 MG: 60 TABLET, EXTENDED RELEASE ORAL at 17:25

## 2024-06-25 RX ADMIN — CEFEPIME 2000 MG: 2 INJECTION, POWDER, FOR SOLUTION INTRAVENOUS at 11:41

## 2024-06-25 RX ADMIN — CARVEDILOL 25 MG: 25 TABLET, FILM COATED ORAL at 17:25

## 2024-06-25 RX ADMIN — SPIRONOLACTONE 25 MG: 25 TABLET ORAL at 11:56

## 2024-06-25 RX ADMIN — ASPIRIN 81 MG: 81 TABLET, COATED ORAL at 11:56

## 2024-06-25 RX ADMIN — FOLIC ACID 1000 MCG: 1 TABLET ORAL at 11:56

## 2024-06-25 RX ADMIN — DULOXETINE HYDROCHLORIDE 60 MG: 30 CAPSULE, DELAYED RELEASE ORAL at 11:56

## 2024-06-25 RX ADMIN — LOSARTAN POTASSIUM 50 MG: 50 TABLET, FILM COATED ORAL at 11:56

## 2024-06-25 RX ADMIN — LEFLUNOMIDE 20 MG: 20 TABLET ORAL at 11:56

## 2024-06-25 RX ADMIN — METHENAMINE HIPPURATE 1 G: 1000 TABLET ORAL at 20:37

## 2024-06-25 RX ADMIN — PANTOPRAZOLE SODIUM 40 MG: 40 TABLET, DELAYED RELEASE ORAL at 11:56

## 2024-06-25 RX ADMIN — Medication 10 ML: at 20:37

## 2024-06-25 RX ADMIN — SODIUM CHLORIDE 40 ML: 9 INJECTION, SOLUTION INTRAVENOUS at 11:44

## 2024-06-25 NOTE — PROGRESS NOTES
I was notified that the patient has had a change in mental status this morning and further workup is being performed. Due to this event, we will cancel the cholecystectomy planned for today.   When she stabilizes, I recommend IR cholecystostomy and continued IV antibiotics.   No acute surgical intervention.

## 2024-06-25 NOTE — PROGRESS NOTES
Inpatient Nutrition Services  Patient Name:  Tawny Shin  YOB: 1955  MRN: [id number]  Admit Date:  6/12/2024  Assessment Date:  6/25/2024   Reason for Assessment       Row Name 06/25/24 1212          Reason for Assessment    Reason For Assessment follow-up protocol     Diagnosis neurologic conditions;other (see comments);infection/sepsis;gastrointestinal disease                    Nutrition/Diet History       Row Name 06/25/24 1212          Nutrition/Diet History    Typical Intake (Food/Fluid/EN/PN) NPO for CCY; procedure now canceled. Will await MD/surgery/team to determine when to resume PO intake. ONS will resume when appropriate with diet. No new weight. No PO food or fluid recorded x 72 hr. BM today. Will continue to monitor while inpatient.     Food Intolerance(s) None     Factors Affecting Nutritional Intake restricted diet                    Labs/Tests/Procedures/Meds       Row Name 06/25/24 1213          Labs/Procedures/Meds    Lab Results Reviewed reviewed     Lab Results Comments H/H        Diagnostic Tests/Procedures    Diagnostic Test/Procedure Reviewed reviewed        Medications    Pertinent Medications Reviewed reviewed     Pertinent Medications Comments See MAR                    Physical Findings       Row Name 06/25/24 1213          Physical Findings    Overall Physical Appearance GCS 14, room air, 2+ edema to legs and knees, 2+ edema left ankle and foot, 3+ edema right ankle and foot, BM 6/25 (moderate), David Score 14, right gluteal wound/incision.                    Estimated/Assessed Needs - Anthropometrics       Row Name 06/25/24 1214          Anthropometrics    Weight for Calculation 94.8 kg (209 lb)        Estimated/Assessed Needs    Additional Documentation Fluid Requirements (Group);KCAL/KG (Group);Protein Requirements (Group)        KCAL/KG    KCAL/KG 18 Kcal/Kg (kcal)     18 Kcal/Kg (kcal) 1706.436        Protein Requirements    Weight Used For Protein Calculations  59 kg (130 lb)  IBW     Est Protein Requirement Amount (gms/kg) 1.5 gm protein     Estimated Protein Requirements (gms/day) 88.45        Fluid Requirements    Fluid Requirements (mL/day) 1706     RDA Method (mL) 1706                    Nutrition Prescription Ordered       Row Name 06/25/24 1214          Nutrition Prescription PO    Current PO Diet NPO;Other (comment)  Strict                    Evaluation of Received Nutrient/Fluid Intake       Row Name 06/25/24 1214          Nutrient/Fluid Evaluation    Number of Days Evaluated 3 days  No data available for 72 hr        PO Evaluation    Number of Days PO Intake Evaluated Insufficient Data                       Problem/Interventions:   Problem 1       Row Name 06/25/24 1215          Nutrition Diagnoses Problem 1    Problem 1 Inadequate Nutrient Intake     Etiology (related to) Medical Diagnosis     Fluid Status Edema     Gastrointestinal Other (comment);Cholecystitis  cholelithiasis     Infectious Disease UTI;Other (comment)  recurrent     Neurological AMS     Renal CKD;Other (comment)  Cr at baseline     Skin Surgical wound;Pressure injury     Signs/Symptoms (evidenced by) NPO     Percent (%) intake recorded --     Over number of meals --               Intervention Goal       Row Name 06/25/24 1217          Intervention Goal    General Meet nutritional needs for age/condition;Reduce/improve symptoms;Disease management/therapy     PO Tolerate PO;Establish PO;Meet estimated needs;Advance diet     Weight No significant weight loss               Nutrition Intervention       Row Name 06/25/24 1217          Nutrition Intervention    RD/Tech Action Follow Tx progress;Care plan reviewd;Await begin PO               Nutrition Prescription       Row Name 06/25/24 1217          Nutrition Prescription PO    PO Prescription Other (comment)  resume per surgery/MD/team                    Education/Evaluation       Row Name 06/25/24 1217          Education    Education No discharge  needs identified at this time        Monitor/Evaluation    Monitor Per protocol                     Electronically signed by:  Gricel Deluca RDN, LD  06/25/24 12:17 CDT

## 2024-06-25 NOTE — PROGRESS NOTES
[facility]  INPATIENT PROGRESS NOTE    Patient Name: Tawny Shin  Date of Admission: 6/12/2024  Today's Date: 06/25/24  Length of Stay: 4  Primary Care Physician: Moises Oliveira MD    Subjective   Chief Complaint: Recurrent UTI, gallstone, kidney stone   HPI   Patient was again confused this morning and had difficulty receiving medications from nursing.  She has improved progressively right now and appears back to her baseline.    Review of Systems   Constitutional:  Positive for activity change, appetite change and fatigue. Negative for chills and fever.   HENT:  Negative for hearing loss, nosebleeds, tinnitus and trouble swallowing.    Eyes:  Negative for visual disturbance.   Respiratory:  Negative for cough, chest tightness, shortness of breath and wheezing.    Cardiovascular:  Negative for chest pain, palpitations and leg swelling.   Gastrointestinal:  Negative for abdominal distention, abdominal pain, blood in stool, constipation, diarrhea, nausea and vomiting.   Endocrine: Negative for cold intolerance, heat intolerance, polydipsia, polyphagia and polyuria.   Genitourinary:  Negative for decreased urine volume, difficulty urinating, dysuria, flank pain, frequency and hematuria.   Musculoskeletal:  Positive for arthralgias, gait problem and myalgias. Negative for joint swelling.   Skin:  Negative for rash.   Allergic/Immunologic: Negative for immunocompromised state.   Neurological:  Positive for weakness. Negative for dizziness, syncope, light-headedness and headaches.   Hematological:  Negative for adenopathy. Does not bruise/bleed easily.   All pertinent negatives and positives are as above. All other systems have been reviewed and are negative unless otherwise stated.     Objective    Temp:  [97.5 °F (36.4 °C)-98.1 °F (36.7 °C)] 98.1 °F (36.7 °C)  Heart Rate:  [76-84] 79  Resp:  [16-18] 16  BP: (107-124)/(44-59) 124/44  Physical Exam  Vitals  and nursing note reviewed.   Constitutional:       Comments: Chronically ill.  Deconditioning.   HENT:      Head: Normocephalic.   Eyes:      Conjunctiva/sclera: Conjunctivae normal.      Pupils: Pupils are equal, round, and reactive to light.   Cardiovascular:      Rate and Rhythm: Normal rate and regular rhythm.      Heart sounds: Normal heart sounds.   Pulmonary:      Effort: Pulmonary effort is normal. No respiratory distress.      Breath sounds: Normal breath sounds.   Abdominal:      General: Bowel sounds are normal. There is no distension.      Palpations: Abdomen is soft.      Comments: Obesity.  No tenderness noted with palpation.   Musculoskeletal:         General: No swelling.      Cervical back: Neck supple.   Skin:     General: Skin is warm and dry.      Capillary Refill: Capillary refill takes 2 to 3 seconds.      Findings: No rash.   Neurological:      Mental Status: She is alert and oriented to person, place, and time.      Motor: Weakness present.      Coordination: Coordination abnormal.      Gait: Gait abnormal.   Psychiatric:         Mood and Affect: Mood normal.         Behavior: Behavior normal.         Thought Content: Thought content normal.          Results Review:  I have reviewed the labs, radiology results, and diagnostic studies.    Laboratory Data:   Results from last 7 days   Lab Units 06/25/24  0503 06/24/24  0437 06/23/24  0428   WBC 10*3/mm3 4.18 4.74 5.03   HEMOGLOBIN g/dL 7.4* 7.8* 7.7*   HEMATOCRIT % 24.6* 26.1* 26.0*   PLATELETS 10*3/mm3 151 175 170        Results from last 7 days   Lab Units 06/25/24  0503 06/24/24  0437 06/23/24  0428 06/22/24  0453   SODIUM mmol/L 142 138 139 138   POTASSIUM mmol/L 3.9 3.8 3.8 4.2   CHLORIDE mmol/L 106 105 102 103   CO2 mmol/L 26.0 25.0 25.0 28.0   BUN mg/dL 14 12 14 16   CREATININE mg/dL 0.98 0.80 0.86 0.93   CALCIUM mg/dL 8.5* 8.6 8.6 9.1   BILIRUBIN mg/dL  --   --   --  0.4   ALK PHOS U/L  --   --   --  81   ALT (SGPT) U/L  --   --   --   18   AST (SGOT) U/L  --   --   --  19   GLUCOSE mg/dL 88 98 90 99       Culture Data:   Urine Culture   Date Value Ref Range Status   06/21/2024 No growth  Final   06/16/2024 25,000 CFU/mL Enterobacter cloacae complex CRE (A)  Final     Comment:       Consider infectious disease consult.  Susceptibility results may not correlate to clinical outcomes.  Carbapenem resistant Enterobacteriaceae, patient may be an isolation risk.  No carbapenemase detected.       Radiology Data:   Imaging Results (Last 24 Hours)       Procedure Component Value Units Date/Time    CT Head Without Contrast [[id number]] Collected: 06/25/24 0934     Updated: 06/25/24 0939    Narrative:      EXAM: CT HEAD WO CONTRAST-      DATE: 6/25/2024 8:19 AM     HISTORY: AMS; N39.0-Urinary tract infection, site not specified;  R41.82-Altered mental status, unspecified; S31.819S-Unspecified open  wound of right buttock, sequela; L72.9-Follicular cyst of the skin and  subcutaneous tissue, unspecified; R13.10-Dysphagia, unspecified;  Z74.09-Other reduced mobility; N13.2-Hydronephrosis with renal and  ureteral calculous obstruction; K81.0-Acute cholecystitis       COMPARISON: 6/12/2024.     DOSE LENGTH PRODUCT: 775.35 mGy.cm  Automated exposure control was also  utilized to decrease patient radiation dose.     TECHNIQUE: Unenhanced CT images obtained from vertex to skull base with  multiplanar reformats.     FINDINGS:  There is no acute intracranial hemorrhage, midline shift, mass effect,  or hydrocephalus. There is no CT evidence for acute infarct.     There are chronic changes with volume loss and chronic small vessel  ischemic change of the periventricular white matter.      SOFT TISSUES: The scalp soft tissues are unremarkable.        SINUS: There is mucosal thickening of the ethmoid air cells and sphenoid  sinus with complete opacification. There is also partial opacification  of the left mastoid air cells.     ORBITS: The visualized orbits and globes are  unremarkable. There is  bilateral lens extraction.          Impression:      1. Chronic changes and no acute intracranial findings.   2. Paranasal sinus mucosal thickening and partial opacification of the  left mastoid air cells.     This report was signed and finalized on 6/25/2024 9:36 AM by Zander Motta.               I have reviewed the patient's current medications.     Assessment/Plan   Assessment  Active Hospital Problems    Diagnosis     **Altered mental status     Esophageal dysphagia     UTI (urinary tract infection)     Hydronephrosis with urinary obstruction due to ureteral calculus     Acute cholecystitis     Hypothyroidism     Stage 3b chronic kidney disease     Near functional paraplegia     Cholelithiasis     Essential hypertension     Sick sinus syndrome     Coronary artery disease involving native coronary artery of native heart without angina pectoris     Venous insufficiency     Rheumatoid arthritis involving multiple sites     Chronic heart failure with preserved ejection fraction     Class 1 obesity due to excess calories with serious comorbidity and body mass index (BMI) of 33.0 to 33.9 in adult     Functional neurological symptom disorder with weakness or paralysis     Chronic anticoagulation     Chronic embolism and thrombosis of unspecified deep veins of left lower extremity        Treatment Plan  Recurrent UTI/neurogenic bladder.  Cefepime/Flagyl antibiotics.  Continue HipRex.  Repeated UA, negative for bacteria.  Consult ID.  Urology consulted and following.   CT scan abdomen pelvic . Obstructive uropathy on the left with mild to moderate dilatation of the upper tracts of the left kidney and left ureter into the true pelvis where there is a 5 x 6 mm calculus within the left ureter approximately 4 to 5 cm above the UVJ- right ureter is decompressed and normal in appearance- No evidence of renal mass. No perinephric fluid collection present, gallbladder is mildly distended- gallstones  within the gallbladder neck,  No pericholecystic inflammatory changes or biliary dilatation, Constipation,  No obstruction or free air, Normal appendix, Calcified fibroid within the lower uterine segment, Mild constipation, No obstruction or free air, Normal appendix, Small fat-containing periumbilical hernia, Previous kyphoplasty at T12, Previous fusion at L3-L4 and L4-L5, Left basilar atelectasis..  Recommendation from urology-evaluation of neurogenic bladder at a university setting like [place].     Obstructing renal calculi left side/moderate dilated of left ureter.  Callus 5 x 6 mm and left 4 to 5 cm above the UVJ junction.  Status post 6/21/2024 urethroscope laser lithotripsy with stent insertion left. Horan in place.     Gallstone, within the gallbladder neck/dysfunctional gallbladder.  Mild distended gallbladder.  Right upper quadrant tenderness.  Ultrasound of the gallbladder-Cholelithiasis with small shadowing stones and sludge within the region of the gallbladder neck- Moderate distention of the gallbladder- however no gallbladder wall thickening or pericholecystic fluid identified- No biliary dilatation.  HIDA scan-Nonvisualization of the gallbladder on images obtained up to 90 minutes- Cystic duct obstruction and acute cholecystitis cannot be excluded on the basis of this exam,  No evidence of common bile duct obstruction with emptying of activity into the C-loop of the duodenum and proximal jejunum, Gallstones and sludge were demonstrated on recent ultrasound- combined with nonvisualization of the gallbladder exclude performance of an ejection fraction..  Discussed with general surgery Dr. Jo-plan for gallbladder removal this coming Tuesday, DC Eliquis today 6/22/2024 start Lovenox today, nursing communication to stop Lovenox 24 hours before surgery on Monday.  Dr. Jo also recommended cardiac clearance for surgery. Cardiology states okay to DC Lovenox today.    Cholecystectomy delay due to  oxygen weaning encephalopathy and concern for further cognitive decline.  General surgery considering referral for interventional radiologist to percutaneous drain.     Reflux/esophageal dysphagia. Dysphagia recent esophageal stretching, GI evaluated and recommended to continue outpatient follow-up.  SLP evaluated and recommended to continue regular diet.  Protonix . Zofran as needed.     Altered mental status/encephalopathy.  Consult neurology  She does not seem to receive anything overnight that could cause worsening mental status changes.  Metronidazole has an incidence of encephalopathy but average is a 28 days and worsening patients with liver disease which makes shortness seem to have at this point.  CT scan the head-No acute intracranial abnormality, Chronic sinusitis and left mastoid effusion.  Repeat this morning.  Medications reviewed  Chest x-ray-Stable chest exam without acute process, No visualized infiltrate.   Patient is on room air.     Hypothyroidism.  Synthroid.     Chronic stage IIIb renal failure.  Creatinine normalized.     Hypomagnesia. Resolved.     Hyponatremia.  Resolved.     Hypokalemia.  Resolved.  Magnesium normal.     Hypertension/sick sinus syndrome/venous insufficiency/CHF.  Eliquis.  Aspirin . Coreg . Lasix.  Imdur.  Cozaar . Aldactone.  Nitro as needed.     Right gluteal wound.  Consult wound care.     Rheumatoid arthritis. Arava.     Depression/anxiety.  Cymbalta.     Postmenopausal.  Estradiol cream.     Anemia.  Hemoglobin decreased.  Iron sulfate.  Folic acid.  No sign of acute bleed.     Pain . lidocaine patch.  Voltaren gel.  Ultram as needed.     Chronic DVT.  DC Eliquis due to possible surgery, on Lovenox.    Functional neurological symptom disorder with paraplegia.     Nutrition.  Cardiac diet.  Vitamin C.  Multivitamins.  Regular/house diet.  Boost supplement.     Deconditioning.  PT and OT consult.  Patient use a walker and bedbound at baseline.     Urine culture-100,000  CFU/mL Enterobacter cloacae complex  25,000 CFU/mL Enterobacter cloacae complex CRE   Blood culture-no growth for 5 days.     Dr. Shin's mom.  Patient already have home health at home.  Patient request for lift at discharge.     Medical Decision Making  Number and Complexity of problems: Recurrent UTI/altered mental status/reflux/rheumatoid arthritis/chronic 3B renal failure  Differential Diagnosis: None     Conditions and Status        Condition is unchanged.     MDM Data  External documents reviewed: Previous note .  Cardiac tracing (EKG, telemetry) interpretation: Sinus.  Radiology interpretation: X-ray/CT scan of the head  Labs reviewed: Laboratory  Any tests that were considered but not ordered: Lab in a.m.     Decision rules/scores evaluated (example [id number], [place], etc): None     Discussed with: Patient and family     Care Planning  Shared decision making: Patient and family  Code status and discussions: DNR     Disposition  Social Determinants of Health that impact treatment or disposition: From home  1 to 3 days.    Electronically signed by Kris Cade MD, 06/25/24, 12:32 CDT.

## 2024-06-25 NOTE — PLAN OF CARE
Goal Outcome Evaluation:  Plan of Care Reviewed With: patient        Progress: improving  Outcome Evaluation: PT tx completed. Pt more awake and alert this pm. Answered all quesetions and followed commands <75%. Min/ModA bed mobility, sit edge of bed S several minutes. Partial stand ModA x 2 utilizing rwx. A/AAROM BLE. Recommend rehab/SNF

## 2024-06-25 NOTE — PROGRESS NOTES
LOS: 4 days   Patient Care Team:  Moises Oliveira MD as PCP - General (Internal Medicine)  Manda, Moises Matson MD as Consulting Physician (Otolaryngology)  Christiano Rao PA as Physician Assistant (Otolaryngology)  Candelaria David MD as Obstetrician (Obstetrics and Gynecology)  Omar Hernandez MD as Cardiologist (Cardiology)  Leta Crawford APRN as Nurse Practitioner (Nurse Practitioner)  Leta Crawford APRN as Nurse Practitioner (Nurse Practitioner)    Chief Complaint:  ureter stone, recurrent UTI    Subjective     Interval History:     Planned robotic cholecystectomy today.    CBI has been discontinued and disconnected in accordance with my order yesterday.     Patient is talking today.     Review of Systems  Pertinent items are noted in HPI     Objective     Vital Signs  Temp:  [97.5 °F (36.4 °C)-98.9 °F (37.2 °C)] 97.6 °F (36.4 °C)  Heart Rate:  [76-90] 76  Resp:  [16-18] 18  BP: (101-163)/(50-69) 117/52    Physical Exam:  Comfortable.     Data Review:       I have reviewed the following data:    CBC (No Diff) (06/25/2024 05:03)   Basic Metabolic Panel (06/25/2024 05:03)     Medication Review:     Current Facility-Administered Medications:     acetaminophen (TYLENOL) tablet 650 mg, 650 mg, Oral, Q6H PRN, Ky Plascencia MD, 650 mg at 06/19/24 1403    ascorbic acid (VITAMIN C) tablet 500 mg, 500 mg, Oral, Daily, Ky Plascencia MD, 500 mg at 06/23/24 0826    aspirin EC tablet 81 mg, 81 mg, Oral, Daily, Ky Plascencia MD, 81 mg at 06/24/24 0938    sennosides-docusate (PERICOLACE) 8.6-50 MG per tablet 2 tablet, 2 tablet, Oral, BID PRN **AND** polyethylene glycol (MIRALAX) packet 17 g, 17 g, Oral, Daily PRN, 17 g at 06/15/24 1553 **AND** bisacodyl (DULCOLAX) EC tablet 5 mg, 5 mg, Oral, Daily PRN **AND** bisacodyl (DULCOLAX) suppository 10 mg, 10 mg, Rectal, Daily PRN, Ky Plascencia MD    Calcium Replacement - Follow Nurse / BPA Driven Protocol, , Does not apply, PRN, Ky Plascencia,  MD    carvedilol (COREG) tablet 25 mg, 25 mg, Oral, BID With Meals, Ky Plascencia MD, 25 mg at 06/24/24 1751    Diclofenac Sodium (VOLTAREN) 1 % gel 4 g, 4 g, Topical, 4x Daily PRN, Ky Plascencia MD    DULoxetine (CYMBALTA) DR capsule 60 mg, 60 mg, Oral, Daily, Ky Plascencia MD, 60 mg at 06/24/24 0938    estradiol (ESTRACE) vaginal cream 2 applicator, 2 g, Vaginal, Daily, Ky Plascencia MD, 2 applicator at 06/24/24 0954    ferrous sulfate tablet 325 mg, 325 mg, Oral, Daily With Breakfast, Ky Plascencia MD, 325 mg at 06/23/24 0826    folic acid (FOLVITE) tablet 1,000 mcg, 1,000 mcg, Oral, Daily, Ky Plascencia MD, 1,000 mcg at 06/23/24 0825    furosemide (LASIX) tablet 40 mg, 40 mg, Oral, Daily, Ky Plascencia MD, 40 mg at 06/24/24 0939    isosorbide mononitrate (IMDUR) 24 hr tablet 60 mg, 60 mg, Oral, Daily, Ky Plascencia MD, 60 mg at 06/24/24 0938    leflunomide (ARAVA) tablet 20 mg, 20 mg, Oral, Daily, Ky Plascencia MD, 20 mg at 06/24/24 0939    levothyroxine (SYNTHROID, LEVOTHROID) tablet 75 mcg, 75 mcg, Oral, Q AM, Ky Plascencia MD, 75 mcg at 06/24/24 0647    Lidocaine 4 % 1 patch, 1 patch, Transdermal, Daily PRN, Ky Plascencia MD    losartan (COZAAR) tablet 50 mg, 50 mg, Oral, Daily, Ky Plascencia MD, 50 mg at 06/24/24 0938    Magnesium Low Dose Replacement - Follow Nurse / BPA Driven Protocol, , Does not apply, PRN, Ky Plascencia MD    methenamine (HIPREX) tablet 1 g, 1 g, Oral, BID, Ky Plascencia MD, 1 g at 06/24/24 2030    metroNIDAZOLE (FLAGYL) IVPB 500 mg, 500 mg, Intravenous, Q8H, Elie Copeland MD, Last Rate: 200 mL/hr at 06/25/24 0136, 500 mg at 06/25/24 0136    multivitamin with minerals 1 tablet, 1 tablet, Oral, Daily, Ky Plascencia MD, 1 tablet at 06/23/24 0825    [DISCONTINUED] Morphine sulfate (PF) injection 1 mg, 1 mg, Intravenous, Q4H PRN **AND** naloxone (NARCAN) injection 0.4 mg, 0.4 mg, Intravenous, Q5 Min PRN, Ky Plascencia MD     nitroglycerin (NITROSTAT) SL tablet 0.4 mg, 0.4 mg, Sublingual, Q5 Min PRN, Ky Plascencia MD    ondansetron (ZOFRAN) injection 4 mg, 4 mg, Intravenous, Q6H PRN, Ky Plascencia MD, 4 mg at 06/23/24 2002    pantoprazole (PROTONIX) EC tablet 40 mg, 40 mg, Oral, Daily, Ky Plascencia MD, 40 mg at 06/24/24 0939    Phosphorus Replacement - Follow Nurse / BPA Driven Protocol, , Does not apply, PRNTemo Donald L, MD    Potassium Replacement - Follow Nurse / BPA Driven Protocol, , Does not apply, PRNTemo Donald L, MD    sodium chloride 0.9 % flush 10 mL, 10 mL, Intravenous, PRN, Ky Plascencia MD, 10 mL at 06/12/24 2200    sodium chloride 0.9 % flush 10 mL, 10 mL, Intravenous, Q12H, Ky Plascencia MD, 10 mL at 06/24/24 0954    sodium chloride 0.9 % flush 10 mL, 10 mL, Intravenous, PRN, Ky Plascencia MD    sodium chloride 0.9 % infusion 40 mL, 40 mL, Intravenous, PRN, Ky Plascencia MD    spironolactone (ALDACTONE) tablet 25 mg, 25 mg, Oral, Daily, Ky Plascencia MD, 25 mg at 06/24/24 0939    traMADol (ULTRAM) tablet 50 mg, 50 mg, Oral, Q12H PRN, Ky Plascencia MD, 50 mg at 06/23/24 2002    Assessment and Plan:    Left ureter stone: POD#34 s/p S/P URS/laser/stent. Will have OR staff remove stent and replace Horan today during planned cholecystectomy.      Recurrent UTI: ID following. Defer current management to them. Recurrences could be from stone - will need to re-evaluate now that stone has been addressed.     URO DISPO: Recommend continuing Horan at discharge. Follow up in 2 weeks for possible voiding trial. Message sent to urology .    I discussed the patient's findings and my recommendations with patient and family    (Please note that portions of this note were completed with a voice recognition program.)    Albaro Dixon MD  06/25/24  06:44 CDT    Time: Time spent: 20 minutes spent performing evaluation and management, chart review, and discussion with patient, > 50%  of time spent in face-to-face encounter

## 2024-06-25 NOTE — THERAPY TREATMENT NOTE
Acute Care - Physical Therapy Treatment Note  [facility]     Patient Name: Tawny Shin  : 1955  MRN: [id number]  Today's Date: 2024      Visit Dx:     ICD-10-CM ICD-9-CM   1. Acute UTI (urinary tract infection)  N39.0 599.0   2. Altered mental status, unspecified altered mental status type  R41.82 780.97   3. Open wound of right buttock, sequela  S31.819S 906.0   4. Cyst of buttocks  L72.9 706.2   5. Dysphagia, unspecified type  R13.10 787.20   6. Impaired mobility [Z74.09]  Z74.09 799.89   7. Hydronephrosis with urinary obstruction due to ureteral calculus  N13.2 592.1     591   8. Acute cholecystitis  K81.0 575.0     Patient Active Problem List   Diagnosis    T12 compression fracture, initial encounter    Chronic embolism and thrombosis of unspecified deep veins of left lower extremity    Chronic anticoagulation    Iron deficiency anemia    Osteoporosis    E coli bacteremia    Epidural hematoma    Pleural effusion, left    Functional neurological symptom disorder with weakness or paralysis    Class 1 obesity due to excess calories with serious comorbidity and body mass index (BMI) of 33.0 to 33.9 in adult    Rheumatoid arthritis involving multiple sites    Chronic heart failure with preserved ejection fraction    Venous insufficiency    Coronary artery disease involving native coronary artery of native heart without angina pectoris    Sick sinus syndrome    Essential hypertension    Pressure injury of skin of heel    Chronic pain    Anemia of chronic disease    Cholelithiasis    Pressure injury of skin of buttock    Near functional paraplegia    Skin cancer    Squamous cell carcinoma of back    Hematoma    Right-sided chest wall pain    Hyperlipidemia LDL goal <70    Malodorous urine    Stage 3b chronic kidney disease    Cyst of buttocks    Sepsis due to Escherichia coli without acute organ dysfunction    Generalized weakness    Paralysis    History of urinary retention    Bacteremia due to  "Enterobacter species    Bacteremia    Hypothyroidism    Abnormal CT of the abdomen    Presence of cardiac pacemaker    Altered mental status    UTI (urinary tract infection)    Esophageal dysphagia    Hydronephrosis with urinary obstruction due to ureteral calculus    Acute cholecystitis     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    Elevated cholesterol     Eustachian tube dysfunction     Heart disease     Herpes simplex     History of transfusion     Hyperlipidemia     Hypertension     Hypothyroidism 12/23/2020    Intrinsic asthma 12/23/2020    Knee dislocation     Labral tear of right hip joint     Laryngitis sicca     Laryngitis, chronic     Left carotid bruit 03/09/2016    MI (myocardial infarction)     Myalgia due to statin 06/25/2019    Open wound of right hip 09/14/2021    Osteomyelitis of right femur 07/06/2021    Otorrhea     Pacemaker 11/17/2016    Primary osteoarthritis of left knee 12/23/2020    Psoriasis vulgaris 12/23/2020    S/P coronary artery stent placement 03/09/2016    Sensorineural hearing loss     Seropositive rheumatoid arthritis of multiple sites 12/27/2019    Overview:  -myochrysine '93-'96 -methotrexate '96--->11/98;r/s  restarted 2/99--> 8/14 (anemia) -sulfasalazine- not effective -penicillamine 6/98-->10/98; no effect -leflunomide 11/98--> - Humira '13-->didn't take - Enbrel 12/14-->3/15- no effect!   Last Assessment & Plan:  - \"aching all over\" because she had to be " off her anti-rheumatic drugs for 2 weeks in preparation for her R knee surgery - he    Sick sinus syndrome 12/27/2019    Sjogren's disease     Spondylolisthesis of lumbar region 01/17/2018    Syncope, recurrent 02/08/2021    Urinary tract infection     UTI (urinary tract infection) 04/19/2024     Past Surgical History:   Procedure Laterality Date    A-V CARDIAC PACEMAKER INSERTION  2016    ATRIAL CARDIAC PACEMAKER INSERTION      CARDIAC CATHETERIZATION      CATARACT EXTRACTION      CERVICAL CORPECTOMY N/A 3/3/2021    Procedure: CERVICAL 6 CORPECTOMY WITH TITANIUM CAGE WITH NEURO MONITORING;  Surgeon: Bandar Shea MD;  Location:  [facility];  Service: Neurosurgery;  Laterality: N/A;    COLONOSCOPY  11/08/2011    One fold in the ascending colon which showed ulcer otherwise normal exam    COLONOSCOPY  11/12/2004    Normal exam repeat in five years    CORONARY ANGIOPLASTY WITH STENT PLACEMENT      X 2; 2013 & 2014    ENDOSCOPY  07/10/2014    Normal exam    ENDOSCOPY N/A 5/30/2024    Procedure: ESOPHAGOGASTRODUODENOSCOPY WITH ANESTHESIA;  Surgeon: Taiwo Sanchez MD;  Location: [facility] ENDOSCOPY;  Service: Gastroenterology;  Laterality: N/A;  preop; dysphagia  postop: balloon dilation  PCP Jesus Manuel jeff    FLAP LEG Right 9/14/2021    Procedure: RIGHT GLUTEAL FASCIOCUTANEOUS ADVANCEMENT FLAP AND RIGHT TENSOR FASCIAL JESSICA FLAP;  Surgeon: Amadeo Turner MD;  Location:  [facility];  Service: Plastics;  Laterality: Right;    HIP ABDUCTION TENOTOMY BILATERAL Right 1/14/2021    Procedure: RIGHT HIP GLUTEUS MEDLUS / MINIMUS REPAIR, POSSIBLE ACHILLES ALLOGRAFT;  Surgeon: Nino Carlson MD;  Location:  [facility];  Service: Orthopedics;  Laterality: Right;    INCISION AND DRAINAGE ABSCESS Right 6/4/2022    Procedure: INCISION AND DRAINAGE ABSCESS right hip;  Surgeon: Magda Salcido MD;  Location:  [facility];  Service: General;  Laterality: Right;    INCISION AND DRAINAGE ABSCESS Right 6/10/2022    Procedure: RIGHT HIP  INCISION AND DRAINAGE. MD NEEDS 3L VANC IRRIGATION, CURRETTES, DAICANS, KERLEX ROLLS;  Surgeon: Amadeo Turner MD;  Location:  [facility];  Service: Plastics;  Laterality: Right;    INCISION AND DRAINAGE HIP Right 2/9/2021    Procedure: HIP INCISION AND DRAINAGE;  Surgeon: Nino Carlson MD;  Location:  [facility];  Service: Orthopedics;  Laterality: Right;    INCISION AND DRAINAGE LEG Right 10/24/2021    Procedure: INCISION AND DRAINAGE LOWER EXTREMITY;  Surgeon: Amadeo Turner MD;  Location:  [facility];  Service: Plastics;  Laterality: Right;    INCISION AND DRAINAGE OF WOUND Right 7/8/2021    Procedure: INCISION AND DRAINAGE WOUND RIGHT HIP;  Surgeon: James Huntley MD;  Location:  [facility];  Service: Orthopedics;  Laterality: Right;    JOINT REPLACEMENT      KYPHOPLASTY WITH BIOPSY Bilateral 10/26/2021    Procedure: THOARCIC 12 KYPHOPLASTY WITH BIOPSY;  Surgeon: Bandar Shea MD;  Location:  [facility];  Service: Neurosurgery;  Laterality: Bilateral;    LEG DEBRIDEMENT Right 9/14/2021    Procedure: DEBRIDEMENT OF RIGHT HIP WOUND, RIGHT GLUTEAL FASCIOCUTANEOUS ADVANCEMENT FLAP AND RIGHT TENSOR FASCIAL JESSICA FLAP;  Surgeon: Amadeo Turner MD;  Location:  [facility];  Service: Plastics;  Laterality: Right;    LUMBAR DISCECTOMY Right 3/23/2021    Procedure: LUMBAR DISCECTOMY MICRO, Lumbar 1/2 right;  Surgeon: Bandar Shea MD;  Location:  [facility];  Service: Neurosurgery;  Laterality: Right;    LUMBAR FUSION N/A 1/19/2018    Procedure: L3-4,L4-5 DECOMPRESSION, POSTERIOR SPINAL FUSION WITH INSTRUMENTATION;  Surgeon: Fortino Oropeza MD;  Location:  [facility];  Service:     LUMBAR LAMINECTOMY WITH FUSION Left 1/17/2018    Procedure: LEFT L3-4 L4-5 LATERAL LUMBAR INTERBODY FUSION;  Surgeon: Fortino Oropeza MD;  Location:  [facility];  Service:     MASS EXCISION Right 4/23/2024    Procedure: RIGHT BUTTOCK MASS EXCISION;  Surgeon: Moises Keyes MD;  Location:  [facility];  Service: General;   Laterality: Right;    MYRINGOTOMY W/ TUBES  09/04/2014    TUBES NO LONGER IN PLACE    OTHER SURGICAL HISTORY      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 so hardware was removed and spacers were placed    REPLACEMENT TOTAL KNEE Right     URETEROSCOPY LASER LITHOTRIPSY WITH STENT INSERTION N/A 6/21/2024    Procedure: URETEROSCOPY LASER LITHOTRIPSY WITH STENT INSERTION LEFT;  Surgeon: Ky Plascencia MD;  Location: [facility];  Service: Urology;  Laterality: N/A;     PT Assessment (Last 12 Hours)       PT Evaluation and Treatment       Row Name 06/25/24 8896          Physical Therapy Time and Intention    Subjective Information no complaints  -KJ     Document Type therapy note (daily note)  -KJ     Mode of Treatment physical therapy  -KJ     Patient Effort adequate  -KJ       Row Name 06/25/24 1343          General Information    Existing Precautions/Restrictions fall  -KJ       Row Name 06/25/24 1344          Pain    Pretreatment Pain Rating 0/10 - no pain  -KJ     Posttreatment Pain Rating 0/10 - no pain  -KJ       Row Name 06/25/24 1344          Bed Mobility    Rolling Left [place] (Bed Mobility) moderate assist (50% patient effort)  -KJ     Rolling Right [place] (Bed Mobility) moderate assist (50% patient effort)  -KJ     Supine-Sit [place] (Bed Mobility) minimum assist (75% patient effort);moderate assist (50% patient effort)  -KJ     Sit-Supine [place] (Bed Mobility) minimum assist (75% patient effort);moderate assist (50% patient effort)  -KJ       Row Name 06/25/24 9238          Sit-Stand Transfer    Sit-Stand [place] (Transfers) verbal cues;minimum assist (75% patient effort);moderate assist (50% patient effort);2 person assist  -KJ     Assistive Device (Sit-Stand Transfers) walker, front-wheeled  -KJ     Comment, (Sit-Stand Transfer) partial stand x 1  -KJ       Row Name 06/25/24 6739          Stand-Sit Transfer    Stand-Sit [place] (Transfers) verbal cues;minimum assist (75% patient  effort);moderate assist (50% patient effort)  -KJ       Row Name 06/25/24 1345          Balance    Balance Assessment sitting static balance  -KJ     Static Sitting Balance standby assist  -KJ     Dynamic Sitting Balance contact guard;minimal assist  -KJ     Position, Sitting Balance unsupported;sitting edge of bed  -KJ       Row Name             Wound 04/23/24 0936 Right gluteal Incision    Wound - Properties Group Placement Date: 04/23/24  -EP Placement Time: 0936  -EP Present on Original Admission: N  -EP Side: Right  -EP Location: gluteal  -EP Primary Wound Type: Incision  -EP    Retired Wound - Properties Group Placement Date: 04/23/24  -EP Placement Time: 0936  -EP Present on Original Admission: N  -EP Side: Right  -EP Location: gluteal  -EP Primary Wound Type: Incision  -EP    Retired Wound - Properties Group Date first assessed: 04/23/24  -EP Time first assessed: 0936  -EP Present on Original Admission: N  -EP Side: Right  -EP Location: gluteal  -EP Primary Wound Type: Incision  -EP      Row Name 06/25/24 1345          Positioning and Restraints    Pre-Treatment Position in bed  -KJ     Post Treatment Position bed  -KJ               User Key  (r) = Recorded By, (t) = Taken By, (c) = Cosigned By      Initials Name Provider Type    Emilee Gloria PTA Physical Therapist Assistant    Sonny Thompson RN Registered Nurse                    Physical Therapy Education       Title: PT OT SLP Therapies (Done)       Topic: Physical Therapy (Done)       Point: Mobility training (Done)       Learning Progress Summary             Patient Acceptance, E, VU by KS at 6/20/2024 1715    Acceptance, E, VU by MS at 6/18/2024 1502    Comment: role of PT in her care                         Point: Home exercise program (Done)       Learning Progress Summary             Patient Acceptance, E, VU by KS at 6/20/2024 1715    Acceptance, E,D,TB, VU,DU,NR by KH at 6/15/2024 1514    Acceptance, E,TB,D, VU,DU,NR by KH at 6/14/2024  1550                         Point: Body mechanics (Done)       Learning Progress Summary             Patient Acceptance, E, VU by KS at 6/20/2024 1715    Acceptance, E,D,TB, VU,DU,NR by  at 6/15/2024 1514    Acceptance, E,TB,D, VU,DU,NR by  at 6/14/2024 1550                         Point: Precautions (Done)       Learning Progress Summary             Patient Acceptance, E, VU by KS at 6/20/2024 1715    Acceptance, E,D,TB, VU,DU,NR by  at 6/15/2024 1514    Acceptance, E,TB,D, VU,DU,NR by  at 6/14/2024 1550                                         User Key       Initials Effective Dates Name Provider Type Discipline    MS 07/11/23 -  Nicole Olson, PT, DPT, NCS Physical Therapist PT    KS 02/27/23 -  Ewelina Morales RN Registered Nurse Nurse     10/17/23 -  Stephanie Doll RN Registered Nurse Nurse                  PT Recommendation and Plan     Plan of Care Reviewed With: patient  Progress: improving  Outcome Evaluation: PT tx completed. Pt more awake and alert this pm. Answered all quesetions and followed commands <75%. Min/ModA bed mobility, sit edge of bed S several minutes. Partial stand ModA x 2 utilizing rwx. A/AAROM BLE. Recommend rehab/SNF   Outcome Measures       Row Name 06/25/24 1400 06/24/24 1400          How much help from another person do you currently need...    Turning from your back to your side while in flat bed without using bedrails? 2  -KJ 1  -KJ     Moving from lying on back to sitting on the side of a flat bed without bedrails? 2  -KJ 1  -KJ     Moving to and from a bed to a chair (including a wheelchair)? 2  -KJ 1  -KJ     Standing up from a chair using your arms (e.g., wheelchair, bedside chair)? 2  -KJ 1  -KJ     Climbing 3-5 steps with a railing? 1  -KJ 1  -KJ     To walk in hospital room? 1  -KJ 1  -KJ     AM-PAC 6 Clicks Score (PT) 10  -KJ 6  -KJ     Highest Level of Mobility Goal 4 --> Transfer to chair/commode  -KJ 2 --> Bed activities/dependent transfer  -KJ         Functional Assessment    Outcome Measure Options AM-PAC 6 Clicks Basic Mobility (PT)  -KJ AM-PAC 6 Clicks Basic Mobility (PT)  -KJ               User Key  (r) = Recorded By, (t) = Taken By, (c) = Cosigned By      Initials Name Provider Type    Emilee Gloria PTA Physical Therapist Assistant                     Time Calculation:    PT Charges       Row Name 06/25/24 1423             Time Calculation    Start Time 1345  -KJ      Stop Time 1423  -KJ      Time Calculation (min) 38 min  -KJ      PT Received On 06/25/24  -KJ      PT Goal Re-Cert Due Date 06/28/24  -KJ         Time Calculation- PT    Total Timed Code Minutes- PT 38 minute(s)  -KJ                User Key  (r) = Recorded By, (t) = Taken By, (c) = Cosigned By      Initials Name Provider Type    Emilee Gloria PTA Physical Therapist Assistant                  Therapy Charges for Today       Code Description Service Date Service Provider Modifiers Qty    56776232314 HC PT THER PROC EA 15 MIN 6/24/2024 Emilee Paul, PTA GP 1    76214027977 HC PT THERAPEUTIC ACT EA 15 MIN 6/24/2024 Emilee Paul, PTA GP 2    31785952680 HC PT THER PROC EA 15 MIN 6/25/2024 Emilee Paul, PTA GP 1    19047412975 HC PT THERAPEUTIC ACT EA 15 MIN 6/25/2024 Emilee Paul, PTA GP 2            PT G-Codes  Outcome Measure Options: AM-PAC 6 Clicks Basic Mobility (PT)  AM-PAC 6 Clicks Score (PT): 10  AM-PAC 6 Clicks Score (OT): 11    Emilee Paul PTA  6/25/2024

## 2024-06-25 NOTE — PROGRESS NOTES
INFECTIOUS DISEASES PROGRESS NOTE    Patient:  Tawny Shin  YOB: 1955  MRN: [id number]   Admit date: 6/12/2024   Admitting Physician: Kris Cade,*  Primary Care Physician: Moises Oliveira MD    Chief Complaint: None offered    Interval History: Patient's daughter at bedside.  She indicated patient was not talking much.  She noted she sat on the side of the bed therapy yesterday for about 4 minutes.  Therapy notes about 15 minutes although patient did attempt to lay down several times.    Discussed with LANDON Clark.  She reports while in the room the patient did say several complete sentences but was not responding much to direct questions other than nodding her head or shaking her head.      Allergies:   Allergies   Allergen Reactions    Atorvastatin Other (See Comments)     LEG CRAMPS      Amoxicillin Rash    Escitalopram Rash    Nabumetone Rash    Niacin Er Rash    Penicillin G Rash    Penicillins Rash    Simvastatin Rash       Current Scheduled Medications:   ascorbic acid, 500 mg, Oral, Daily  aspirin, 81 mg, Oral, Daily  carvedilol, 25 mg, Oral, BID With Meals  DULoxetine, 60 mg, Oral, Daily  estradiol, 2 g, Vaginal, Daily  ferrous sulfate, 325 mg, Oral, Daily With Breakfast  folic acid, 1,000 mcg, Oral, Daily  furosemide, 40 mg, Oral, Daily  isosorbide mononitrate, 60 mg, Oral, Daily  leflunomide, 20 mg, Oral, Daily  levothyroxine, 75 mcg, Oral, Q AM  losartan, 50 mg, Oral, Daily  methenamine, 1 g, Oral, BID  metroNIDAZOLE, 500 mg, Intravenous, Q8H  multivitamin with minerals, 1 tablet, Oral, Daily  pantoprazole, 40 mg, Oral, Daily  sodium chloride, 10 mL, Intravenous, Q12H  spironolactone, 25 mg, Oral, Daily      Current PRN Medications:    acetaminophen    senna-docusate sodium **AND** polyethylene glycol **AND** bisacodyl **AND** bisacodyl    Calcium Replacement - Follow Nurse / BPA Driven Protocol    Diclofenac Sodium    Lidocaine    Magnesium Low Dose Replacement -  "Follow Nurse / BPA Driven Protocol    [DISCONTINUED] Morphine **AND** naloxone    nitroglycerin    ondansetron    Phosphorus Replacement - Follow Nurse / BPA Driven Protocol    Potassium Replacement - Follow Nurse / BPA Driven Protocol    sodium chloride    sodium chloride    sodium chloride    traMADol              Objective     Vital Signs:  Temp (24hrs), Av.9 °F (36.6 °C), Min:97.5 °F (36.4 °C), Max:98.3 °F (36.8 °C)      /44 (BP Location: Right arm, Patient Position: Lying)   Pulse 79   Temp 98.1 °F (36.7 °C) (Oral)   Resp 16   Ht 167.6 cm (66\")   Wt 94.8 kg (209 lb)   LMP  (LMP Unknown)   SpO2 95%   BMI 33.73 kg/m²         Physical Exam:    General: The patient is lying in bed awake.  No acute distress  Respiratory: Effort even unlabored  Abdomen: Soft, no rebound or guarding noted today  Neuro: She was alert, her speech was clear when she would talk.  She did not hit her head appropriately.  She did not answer specific questions like what the plan was for today but she did not her head that she knew she was to have surgery and that it was for her gallbladder.  As I was about to leave she said, \"you should probably tell her who you are and pointed to her daughter\".  I did then tell her I met her daughter yesterday again while she was asleep and had met her once before but I should have reintroduced myself when I came in.  Moves extremities without difficulty.  : Horan catheter in place      Results Review:    I reviewed the patient's new clinical results.    Lab Results:    CBC:   Lab Results   Lab 24  0122 24  0354 24  0428 24  0453 24  0428 24  0437 24  0503   WBC 6.69 6.32 6.22 6.21 5.03 4.74 4.18   HEMOGLOBIN 7.4* 7.5* 7.8* 8.1* 7.7* 7.8* 7.4*   HEMATOCRIT 25.3* 25.2* 25.4* 27.2* 26.0* 26.1* 24.6*   PLATELETS 168 192 200 185 170 175 151        AutoDiff:            Manual Diff:          Invalid input(s): \"MONABS\"          CMP:   Lab Results   Lab " 06/22/24  0453 06/23/24  0428 06/24/24  0437 06/25/24  0503   SODIUM 138 139 138 142   POTASSIUM 4.2 3.8 3.8 3.9   CHLORIDE 103 102 105 106   CO2 28.0 25.0 25.0 26.0   BUN 16 14 12 14   CREATININE 0.93 0.86 0.80 0.98   CALCIUM 9.1 8.6 8.6 8.5*   BILIRUBIN 0.4  --   --   --    ALK PHOS 81  --   --   --    ALT (SGPT) 18  --   --   --    AST (SGOT) 19  --   --   --    GLUCOSE 99 90 98 88       Estimated Creatinine Clearance: 62 mL/min (by C-G formula based on SCr of 0.98 mg/dL).    Culture Results:    Microbiology Results (last 10 days)       Procedure Component Value - Date/Time    Urine Culture - Urine, Urine, Catheter [[id number]]  (Normal) Collected: 06/21/24 1116    Lab Status: Final result Specimen: Urine, Catheter Updated: 06/22/24 1152     Urine Culture No growth    Urine Culture - Urine, Urine, Clean Catch [[id number]]  (Abnormal)  (Susceptibility) Collected: 06/16/24 1219    Lab Status: Final result Specimen: Urine, Clean Catch Updated: 06/19/24 0936     Urine Culture 25,000 CFU/mL Enterobacter cloacae complex CRE     Comment:   Consider infectious disease consult.  Susceptibility results may not correlate to clinical outcomes.  Carbapenem resistant Enterobacteriaceae, patient may be an isolation risk.  No carbapenemase detected.       Narrative:      Colonization of the urinary tract without infection is common. Treatment is discouraged unless the patient is symptomatic, pregnant, or undergoing an invasive urologic procedure.    Susceptibility        Enterobacter cloacae complex CRE      LICHA      Cefepime Susceptible      Ceftazidime Resistant      Ceftriaxone Resistant      Gentamicin Susceptible      Imipenem Intermediate      Levofloxacin Resistant      Meropenem Resistant      Nitrofurantoin Resistant      Piperacillin + Tazobactam Resistant      Trimethoprim + Sulfamethoxazole Resistant                                        Radiology:         Imaging Results (Last 72 Hours)       Procedure Component  Value Units Date/Time    NM HIDA SCAN WITHOUT PHARMACOLOGICAL INTERVENTION [[id number]] Collected: 06/22/24 1553     Updated: 06/22/24 1559    Narrative:      EXAMINATION: NM HIDA SCAN WITHOUT PHARMACOLOGICAL INTERVENTION-   6/22/2024 3:53 PM     HISTORY: Abdominal pain. Gallstones.     Radiopharmaceutical: Tc99m Hepatolite 5.20 mCi, IV.     Following intravenous injection of 5.2 mCi mebrofenin and multiple  planar images were obtained of the upper abdomen. This demonstrates  normal accumulation of activity within the hepatic parenchyma with  biliary activity identified 15 to 20 minutes post injection. There is no  evidence of common bile duct obstruction with activity emptied from the  common duct into the C-loop of the duodenum and proximal jejunum. There  is no visualization of the gallbladder at 60 minutes. An additional  delayed image of 90 minutes also demonstrates nonvisualization of the  gallbladder.        Impression  1. Nonvisualization of the gallbladder on images obtained up to 90  minutes. Cystic duct obstruction and acute cholecystitis cannot be  excluded on the basis of this exam.  2. No evidence of common bile duct obstruction with emptying of activity  into the C-loop of the duodenum and proximal jejunum.  3. Gallstones and sludge were demonstrated on recent ultrasound. This  combined with nonvisualization of the gallbladder exclude performance of  an ejection fraction..           This report was signed and finalized on 6/22/2024 3:55 PM by Dr. Neymar Calderon MD.       US Gallbladder [[id number]] Collected: 06/22/24 1037     Updated: 06/22/24 1043    Narrative:      US GALLBLADDER-     HISTORY: Quadrant pain, gallstones     COMPARISON: CT abdomen pelvis 6/20/2024     FINDINGS: Sonographic imaging of the right upper quadrant is performed.     Images of the pancreas are unremarkable. The proximal and mid abdominal  aorta are normal in caliber. Distal aorta is obscured by overlying  shadowing bowel  gas. Proximal IVC is patent.     No focal liver mass is identified. Appropriate directional flow within  the main portal vein. Gallbladder is moderately distended containing  small shadowing stones and sludge within the region of the gallbladder  neck. No gallbladder wall thickening or pericholecystic fluid is  visualized. Common bile duct is normal in caliber measuring 4 mm.     Right kidney appears normal measuring 10.9 cm in length.       Impression:      1. Cholelithiasis with small shadowing stones and sludge within the  region of the gallbladder neck. Moderate distention of the gallbladder,  however no gallbladder wall thickening or pericholecystic fluid  identified. No biliary dilatation.     This report was signed and finalized on 6/22/2024 10:39 AM by Dr. Trinity Gómez MD.                   Active Hospital Problems    Diagnosis     **Altered mental status     Esophageal dysphagia     UTI (urinary tract infection)     Hydronephrosis with urinary obstruction due to ureteral calculus     Acute cholecystitis     Hypothyroidism     Stage 3b chronic kidney disease     Near functional paraplegia     Cholelithiasis     Essential hypertension     Sick sinus syndrome     Coronary artery disease involving native coronary artery of native heart without angina pectoris     Venous insufficiency     Rheumatoid arthritis involving multiple sites     Chronic heart failure with preserved ejection fraction     Class 1 obesity due to excess calories with serious comorbidity and body mass index (BMI) of 33.0 to 33.9 in adult     Functional neurological symptom disorder with weakness or paralysis     Chronic anticoagulation     Chronic embolism and thrombosis of unspecified deep veins of left lower extremity        IMPRESSION:  Cholelithiasis with nonvisualization of gallbladder on HIDA-plans for robotic cholecystectomy June 25  Obstructing left ureteral stone status post cystoscopy and left ureteral stent placement per   [facility] June 21  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  Susceptible to cefepime.    Right gluteal wound  Intermittent verbal response, however per LANDON Clark improved per her report and patient was speaking in complete sentences this morning but not answering direct questions consistently.    RECOMMENDATION:   Cefepime completed last night.  Could have something to do with patient's intermittent verbal response to questions.  Patient has been on cefepime multiple times.  Very limited on antibiotic choice for this patient.  Will continue through this evening along with metronidazole and watch closely  Will complete course of metronidazole this evening   Continue local wound care to right gluteal wound  Continue to monitor mental status, neurologic status-will communicate with Dr. Myers     Discussed with LANDON Clark MD  06/25/24  08:24 CDT

## 2024-06-25 NOTE — CONSULTS
Neurology Consult Note    Consult Date: 2024  Referring MD: Wes Andrews MD  Reason for Consult: aphasi    Patient: Tawny Shin (68 y.o. female)  MRN: [id number]  : 1955    History of Present Illness:   Tawny Shin is a 68 y.o. female with PMH RA, cardiac pacemaker, CAD, MI, thyroid disorder, DVT,  paraplegia secondary to injury, CHF, neurogenic bladder, possible underlying MCI. Patient admitted on 2024 with AMS. She was found to have UTI  and left ureteral calculus with obstruction and acute hypoxic respiratory failure. She has been treated with Cefepime. Lithotripsy performed on 2024. GI was consulted on 2024 for abdominal pain. GB US showed cholelithiasis with small stones and sludge. HIDA scan done showed nonvisualization of the gallbladder and could not rule out cystic duct obstruction. Patient was scheduled for cholecystectomy today however, early this AM, patient was noted to have aphasia and not speaking Stat CT head was done showing no acute process. Family is at  the bedside and assist with history. Daughter tells me yesterday patient was not speaking much and was very weak and not able to drink form a straw and patient was getting liquids by dropping liquids from the straw.  NH3 14 yesterday. At time of my exam, patient is awake and alert. She is known to me and she appeared to recognize me. She was able to state her name but not able to state location or month even with clues. She correctly named 2/2 objects and she followed commands. Family states just prior to my arrival she began to speak in more complete sentences. I am told patient had similar episodes about 1-2 years ago while in formerly [facility] and was encephalopathic at the time. EEG done 2022 was read as unremarkable.  MRI brain with and without 2022 also done during similar episode showed chronic small vessel disease and moderate dilatation of lateral and third ventricles secondary to atrophy.  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    Elevated cholesterol     Eustachian tube dysfunction     Heart disease     Herpes simplex     History of transfusion     Hyperlipidemia     Hypertension     Hypothyroidism 12/23/2020    Intrinsic asthma 12/23/2020    Knee dislocation     Labral tear of right hip joint     Laryngitis sicca     Laryngitis, chronic     Left carotid bruit 03/09/2016    MI (myocardial infarction)     Myalgia due to statin 06/25/2019    Open wound of right hip 09/14/2021    Osteomyelitis of right femur 07/06/2021    Otorrhea     Pacemaker 11/17/2016    Primary osteoarthritis of left knee 12/23/2020    Psoriasis vulgaris 12/23/2020    S/P coronary artery stent placement 03/09/2016    Sensorineural hearing loss     Seropositive rheumatoid arthritis of multiple sites 12/27/2019    Overview:  -myochrysine '93-'96 -methotrexate '96--->11/98;r/s  restarted 2/99--> 8/14 (anemia) -sulfasalazine- not effective -penicillamine 6/98-->10/98; no effect -leflunomide 11/98--> - Humira '13-->didn't take - Enbrel 12/14-->3/15- no effect!   Last Assessment & Plan:  - \"aching all over\" because she had to be off her anti-rheumatic drugs for 2 weeks in preparation for her R knee surgery - he    Sick sinus syndrome 12/27/2019    Sjogren's disease     Spondylolisthesis of lumbar region 01/17/2018    Syncope, recurrent 02/08/2021    Urinary tract " infection     UTI (urinary tract infection) 04/19/2024     Past Surgical History:   Procedure Laterality Date    A-V CARDIAC PACEMAKER INSERTION  2016    ATRIAL CARDIAC PACEMAKER INSERTION      CARDIAC CATHETERIZATION      CATARACT EXTRACTION      CERVICAL CORPECTOMY N/A 3/3/2021    Procedure: CERVICAL 6 CORPECTOMY WITH TITANIUM CAGE WITH NEURO MONITORING;  Surgeon: Bandar Shea MD;  Location:  [facility];  Service: Neurosurgery;  Laterality: N/A;    COLONOSCOPY  11/08/2011    One fold in the ascending colon which showed ulcer otherwise normal exam    COLONOSCOPY  11/12/2004    Normal exam repeat in five years    CORONARY ANGIOPLASTY WITH STENT PLACEMENT      X 2; 2013 & 2014    ENDOSCOPY  07/10/2014    Normal exam    ENDOSCOPY N/A 5/30/2024    Procedure: ESOPHAGOGASTRODUODENOSCOPY WITH ANESTHESIA;  Surgeon: Taiwo Sanchez MD;  Location: [facility] ENDOSCOPY;  Service: Gastroenterology;  Laterality: N/A;  preop; dysphagia  postop: balloon dilation  PCP Jesus Manuel jeff    FLAP LEG Right 9/14/2021    Procedure: RIGHT GLUTEAL FASCIOCUTANEOUS ADVANCEMENT FLAP AND RIGHT TENSOR FASCIAL JESSICA FLAP;  Surgeon: Amadeo Turner MD;  Location:  [facility];  Service: Plastics;  Laterality: Right;    HIP ABDUCTION TENOTOMY BILATERAL Right 1/14/2021    Procedure: RIGHT HIP GLUTEUS MEDLUS / MINIMUS REPAIR, POSSIBLE ACHILLES ALLOGRAFT;  Surgeon: Nino Carlson MD;  Location:  [facility];  Service: Orthopedics;  Laterality: Right;    INCISION AND DRAINAGE ABSCESS Right 6/4/2022    Procedure: INCISION AND DRAINAGE ABSCESS right hip;  Surgeon: Magda Salcido MD;  Location:  [facility];  Service: General;  Laterality: Right;    INCISION AND DRAINAGE ABSCESS Right 6/10/2022    Procedure: RIGHT HIP INCISION AND DRAINAGE. MD NEEDS 3L VANC IRRIGATION, CURRETTES, DAICANS, KERLEX ROLLS;  Surgeon: Amadeo Turner MD;  Location:  [facility];  Service: Plastics;  Laterality: Right;    INCISION AND DRAINAGE HIP Right 2/9/2021    Procedure: HIP  INCISION AND DRAINAGE;  Surgeon: Nino Carlson MD;  Location:  [facility];  Service: Orthopedics;  Laterality: Right;    INCISION AND DRAINAGE LEG Right 10/24/2021    Procedure: INCISION AND DRAINAGE LOWER EXTREMITY;  Surgeon: Amadeo Turner MD;  Location:  [facility];  Service: Plastics;  Laterality: Right;    INCISION AND DRAINAGE OF WOUND Right 7/8/2021    Procedure: INCISION AND DRAINAGE WOUND RIGHT HIP;  Surgeon: James Huntley MD;  Location:  [facility];  Service: Orthopedics;  Laterality: Right;    JOINT REPLACEMENT      KYPHOPLASTY WITH BIOPSY Bilateral 10/26/2021    Procedure: THOARCIC 12 KYPHOPLASTY WITH BIOPSY;  Surgeon: Bandar Shea MD;  Location:  [facility];  Service: Neurosurgery;  Laterality: Bilateral;    LEG DEBRIDEMENT Right 9/14/2021    Procedure: DEBRIDEMENT OF RIGHT HIP WOUND, RIGHT GLUTEAL FASCIOCUTANEOUS ADVANCEMENT FLAP AND RIGHT TENSOR FASCIAL JESSICA FLAP;  Surgeon: Amadeo Turner MD;  Location:  [facility];  Service: Plastics;  Laterality: Right;    LUMBAR DISCECTOMY Right 3/23/2021    Procedure: LUMBAR DISCECTOMY MICRO, Lumbar 1/2 right;  Surgeon: Bandar Shea MD;  Location:  [facility];  Service: Neurosurgery;  Laterality: Right;    LUMBAR FUSION N/A 1/19/2018    Procedure: L3-4,L4-5 DECOMPRESSION, POSTERIOR SPINAL FUSION WITH INSTRUMENTATION;  Surgeon: Fortino Oropeza MD;  Location:  [facility];  Service:     LUMBAR LAMINECTOMY WITH FUSION Left 1/17/2018    Procedure: LEFT L3-4 L4-5 LATERAL LUMBAR INTERBODY FUSION;  Surgeon: Fortino Oropeza MD;  Location:  [facility];  Service:     MASS EXCISION Right 4/23/2024    Procedure: RIGHT BUTTOCK MASS EXCISION;  Surgeon: Moises Keyes MD;  Location:  [facility];  Service: General;  Laterality: Right;    MYRINGOTOMY W/ TUBES  09/04/2014    TUBES NO LONGER IN PLACE    OTHER SURGICAL HISTORY      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 so hardware was removed and spacers were placed    REPLACEMENT TOTAL KNEE Right     URETEROSCOPY  LASER LITHOTRIPSY WITH STENT INSERTION N/A 6/21/2024    Procedure: URETEROSCOPY LASER LITHOTRIPSY WITH STENT INSERTION LEFT;  Surgeon: Ky Plascencia MD;  Location: [facility];  Service: Urology;  Laterality: N/A;       Medications On Admission:  Medications Prior to Admission   Medication Sig Dispense Refill Last Dose    acetaminophen (TYLENOL) 325 MG tablet Take 2 tablets by mouth Every 6 (Six) Hours As Needed for Mild Pain (mild pain). Indications: Fever, Pain   Past Week    apixaban (Eliquis) 5 MG tablet tablet Take 1 tablet by mouth 2 (Two) Times a Day.   Past Week    Ascorbic Acid (Vitamin C) 500 MG capsule Take 1 tablet by mouth Daily. Indications: Inadequate Vitamin C   Past Week    aspirin 81 MG EC tablet Take 1 tablet by mouth Daily. Indications: Buildup of Plaques in Large Arteries Leading to the Brain   Past Week    carvedilol (COREG) 25 MG tablet Take 1 tablet by mouth 2 (Two) Times a Day With Meals. Indications: High Blood Pressure Disorder   Past Week    Diclofenac Sodium (VOLTAREN) 1 % gel gel Apply 4 g topically to the appropriate area as directed 4 (Four) Times a Day As Needed (Mild pain). Indications: Joint Damage causing Pain and Loss of Function   Past Week    DULoxetine (CYMBALTA) 60 MG capsule Take 1 capsule by mouth Daily. Indications: Musculoskeletal Pain   Past Week    estradiol (ESTRACE) 0.1 MG/GM vaginal cream Insert 2 g into the vagina Daily. 42.5 g 0 Past Week    ferrous sulfate 324 (65 Fe) MG tablet delayed-release EC tablet Take 1 tablet by mouth Daily With Breakfast. Indications: Iron Deficiency   Past Week    folic acid (FOLVITE) 1 MG tablet Take 1 tablet by mouth Daily. Indications: Anemia From Inadequate Folic Acid   Past Week    furosemide (LASIX) 40 MG tablet Take 1 tablet by mouth Daily. Indications: Cardiac Failure, Edema   Past Week    isosorbide mononitrate (IMDUR) 60 MG 24 hr tablet Take 1 tablet by mouth Daily. Indications: Stable Angina Pectoris   Past Week     leflunomide (ARAVA) 20 MG tablet Take 1 tablet by mouth every night at bedtime. Indications: Rheumatoid Arthritis 90 tablet 3 Past Week    levothyroxine (SYNTHROID, LEVOTHROID) 75 MCG tablet Take 1 tablet by mouth Daily. Indications: Underactive Thyroid   Past Week    lidocaine (Lidoderm) 5 % Place 1 patch on the skin as directed by provider Daily As Needed for Mild Pain. Remove & Discard patch within 12 hours or as directed by MD 30 each 2 Past Week    losartan (COZAAR) 50 MG tablet Take 1 tablet by mouth Daily. Indications: High Blood Pressure Disorder   Past Week    methenamine (HIPREX) 1 g tablet Take 1 tablet by mouth 2 (Two) Times a Day With Meals.   Past Week    multivitamin with minerals tablet tablet Take 1 tablet by mouth Daily.   Past Week    pantoprazole (Protonix) 40 MG EC tablet Take 1 tablet by mouth Daily for 90 days. 90 tablet 0 Past Week    predniSONE (DELTASONE) 5 MG tablet Take 1 tablet by mouth Daily. Indications: Acute Joint Inflammation in Gout   Past Week    spironolactone (ALDACTONE) 25 MG tablet Take 1 tablet by mouth Daily. 30 tablet 11 Past Week    traMADol (ULTRAM) 50 MG tablet Take 1 tablet by mouth Every 12 (Twelve) Hours As Needed for Moderate Pain. Indications: Pain   Past Week    valACYclovir (VALTREX) 500 MG tablet Take 1 tablet by mouth Daily As Needed. Indications: Shingles   Past Week    nitroglycerin (NITROSTAT) 0.4 MG SL tablet Place 1 tablet under the tongue Every 5 (Five) Minutes As Needed for Chest Pain. Take no more than 3 doses in 15 minutes.  Indications: Acute Angina Pectoris   Unknown       Current Medications:    Current Facility-Administered Medications:     acetaminophen (TYLENOL) tablet 650 mg, 650 mg, Oral, Q6H PRN, Ky Plascencia MD, 650 mg at 06/19/24 1403    ascorbic acid (VITAMIN C) tablet 500 mg, 500 mg, Oral, Daily, Ky Plascencia MD, 500 mg at 06/25/24 1156    aspirin EC tablet 81 mg, 81 mg, Oral, Daily, Ky Plascencia MD, 81 mg at 06/25/24 1156     sennosides-docusate (PERICOLACE) 8.6-50 MG per tablet 2 tablet, 2 tablet, Oral, BID PRN **AND** polyethylene glycol (MIRALAX) packet 17 g, 17 g, Oral, Daily PRN, 17 g at 06/15/24 1553 **AND** bisacodyl (DULCOLAX) EC tablet 5 mg, 5 mg, Oral, Daily PRN **AND** bisacodyl (DULCOLAX) suppository 10 mg, 10 mg, Rectal, Daily PRN, Ky Plascencia MD    Calcium Replacement - Follow Nurse / BPA Driven Protocol, , Does not apply, PRN, Ky Plascencia MD    carvedilol (COREG) tablet 25 mg, 25 mg, Oral, BID With Meals, Ky Plascencia MD, 25 mg at 06/25/24 1156    cefepime 2000 mg IVPB in 100 mL NS (MBP), 2,000 mg, Intravenous, Q8H, Maggie Bang MD, 2,000 mg at 06/25/24 1141    Diclofenac Sodium (VOLTAREN) 1 % gel 4 g, 4 g, Topical, 4x Daily PRN, Ky Plascencia MD    DULoxetine (CYMBALTA) DR capsule 60 mg, 60 mg, Oral, Daily, Ky Plascencia MD, 60 mg at 06/25/24 1156    estradiol (ESTRACE) vaginal cream 2 applicator, 2 g, Vaginal, Daily, Ky Plascencia MD, 2 applicator at 06/25/24 1200    ferrous sulfate tablet 325 mg, 325 mg, Oral, Daily With Breakfast, Ky Plascencia MD, 325 mg at 06/25/24 1156    folic acid (FOLVITE) tablet 1,000 mcg, 1,000 mcg, Oral, Daily, Ky Plascencia MD, 1,000 mcg at 06/25/24 1156    furosemide (LASIX) tablet 40 mg, 40 mg, Oral, Daily, Ky Plascencia MD, 40 mg at 06/25/24 1156    isosorbide mononitrate (IMDUR) 24 hr tablet 60 mg, 60 mg, Oral, Daily, Ky Plascencia MD, 60 mg at 06/24/24 0938    leflunomide (ARAVA) tablet 20 mg, 20 mg, Oral, Daily, Ky Plascencia MD, 20 mg at 06/25/24 1156    levothyroxine (SYNTHROID, LEVOTHROID) tablet 75 mcg, 75 mcg, Oral, Q AM, Ky Plascencia MD, 75 mcg at 06/24/24 0647    Lidocaine 4 % 1 patch, 1 patch, Transdermal, Daily PRN, Ky Plascencia MD    losartan (COZAAR) tablet 50 mg, 50 mg, Oral, Daily, Ky Plascencia MD, 50 mg at 06/25/24 1156    Magnesium Low Dose Replacement - Follow Nurse / BPA Driven Protocol, , Does  not apply, PRNTemo Donald L, MD    methenamine (HIPREX) tablet 1 g, 1 g, Oral, BID, Ky Plascencia MD, 1 g at 06/25/24 1159    metroNIDAZOLE (FLAGYL) IVPB 500 mg, 500 mg, Intravenous, Q8H, Elie Copeland MD, Last Rate: 200 mL/hr at 06/25/24 1141, 500 mg at 06/25/24 1141    multivitamin with minerals 1 tablet, 1 tablet, Oral, Daily, Ky Plascencia MD, 1 tablet at 06/25/24 1156    [DISCONTINUED] Morphine sulfate (PF) injection 1 mg, 1 mg, Intravenous, Q4H PRN **AND** naloxone (NARCAN) injection 0.4 mg, 0.4 mg, Intravenous, Q5 Min PRN, Ky Plascencia MD    nitroglycerin (NITROSTAT) SL tablet 0.4 mg, 0.4 mg, Sublingual, Q5 Min PRN, Ky Plascencia MD    ondansetron (ZOFRAN) injection 4 mg, 4 mg, Intravenous, Q6H PRN, Ky Plascencia MD, 4 mg at 06/23/24 2002    pantoprazole (PROTONIX) EC tablet 40 mg, 40 mg, Oral, Daily, Ky Plascencia MD, 40 mg at 06/25/24 1156    Phosphorus Replacement - Follow Nurse / BPA Driven Protocol, , Does not apply, Temo PALACIO Donald L, MD    Potassium Replacement - Follow Nurse / BPA Driven Protocol, , Does not apply, PRNTemo Donald L, MD    sodium chloride 0.9 % flush 10 mL, 10 mL, Intravenous, PRNTemo Donald L, MD, 10 mL at 06/12/24 2200    sodium chloride 0.9 % flush 10 mL, 10 mL, Intravenous, Q12H, Ky Plascencia MD, 10 mL at 06/25/24 1202    sodium chloride 0.9 % flush 10 mL, 10 mL, Intravenous, PRNTemo Donald L, MD    sodium chloride 0.9 % infusion 40 mL, 40 mL, Intravenous, PRNTemo Donald L, MD, 40 mL at 06/25/24 1144    spironolactone (ALDACTONE) tablet 25 mg, 25 mg, Oral, Daily, Ky Plascencia MD, 25 mg at 06/25/24 1156    traMADol (ULTRAM) tablet 50 mg, 50 mg, Oral, Q12H PRN, Ky Plascencia MD, 50 mg at 06/23/24 2002     Allergies:  Allergies   Allergen Reactions    Atorvastatin Other (See Comments)     LEG CRAMPS      Amoxicillin Rash    Escitalopram Rash    Nabumetone Rash    Niacin Er Rash    Penicillin G Rash    Penicillins  Rash    Simvastatin Rash       Social Hx:  Social History     Socioeconomic History    Marital status:    Tobacco Use    Smoking status: Never     Passive exposure: Never    Smokeless tobacco: Never   Vaping Use    Vaping status: Never Used   Substance and Sexual Activity    Alcohol use: No    Drug use: Never    Sexual activity: Defer     Birth control/protection: Abstinence       Family Hx:  Family History   Problem Relation Age of Onset    Cancer Mother         Lung cancer    Heart disease Father         Doied of heart. Attack     Physical Examination:   Vital Signs:  Vitals:    06/24/24 2113 06/25/24 0048 06/25/24 0340 06/25/24 0808   BP: 107/50 123/59 117/52 124/44   BP Location: Right arm Right arm Right arm Right arm   Patient Position: Lying Lying Lying Lying   Pulse: 77 79 76 79   Resp: 16 18 18 16   Temp: 97.7 °F (36.5 °C) 97.5 °F (36.4 °C) 97.6 °F (36.4 °C) 98.1 °F (36.7 °C)   TempSrc: Axillary Axillary Axillary Oral   SpO2: 94% 94% 95% 95%   Weight:       Height:           General Exam:    Neurologic Exam:    Mental Status:    -Awake, Alert, Oriented X to person. She recognizes and names family members at bedside.   -No word finding difficulties  -No aphasia  -No dysarthria  -Follows simple and complex commands    CN II:  Visual fields full.  Pupils equally reactive to light  CN III, IV, VI:  Extraocular Muscles full with no signs of nystagmus  CN V:  Facial sensory is symmetric with no asymmetries.  CN VII:  Facial motor symmetric  CN VIII:  Gross hearing intact bilaterally  CN IX:  Palate elevates symmetrically  CN X:  Palate elevates symmetrically  CN XI:  Shoulder shrug symmetric  CN XII:  Tongue is midline on protrusion    Motor: (strength out of 5:  1= minimal movement, 2 = movement in plane of gravity, 3 = movement against gravity, 4 = movement against some resistance, 5 = full strength)    -Right Upper Ext: Proximal: 5 Distal: 5  -Left Upper Ext: Proximal: 5 Distal: 5    -paraplegia  bilateral LE.    DTR:  -Right   Biceps: 2+ Triceps: 2+ Brachioradialis: 2+   Patella: 2+ Ankle: 2+ Neg Babinski  -Left   Biceps: 2+ Triceps: 2+ Brachioradialis: 2+   Patella: 2+ Ankle: 2+ Neg Babinski    Sensory:  -Intact to light touch, pinprick, temperature, pain, and proprioception    Coordination:  -Finger to nose intact  -Gait  -at baseline is nonambulatory.      Recent Diagnostics:   Laboratory Results:   - Reviewed in EMR  Lab Results   Component Value Date    GLUCOSE 88 06/25/2024    CALCIUM 8.5 (L) 06/25/2024     06/25/2024    K 3.9 06/25/2024    CO2 26.0 06/25/2024     06/25/2024    BUN 14 06/25/2024    CREATININE 0.98 06/25/2024    EGFRIFAFRI 110 10/12/2021    EGFRIFNONA 99 11/11/2021    BCR 14.3 06/25/2024    ANIONGAP 10.0 06/25/2024     Lab Results   Component Value Date    WBC 4.18 06/25/2024    HGB 7.4 (L) 06/25/2024    HCT 24.6 (L) 06/25/2024    MCV 93.5 06/25/2024     06/25/2024     Lab Results   Component Value Date    PTT 68.0 (H) 05/02/2024    INR 1.56 (H) 05/02/2024     Lab Results   Component Value Date    TRIG 252 (H) 05/25/2024    HDL 23 (L) 05/25/2024     (H) 05/25/2024     Lab Results   Component Value Date    HGBA1C 6.20 (H) 05/25/2024       Imaging Results:  Imaging Results (Last 24 Hours)       Procedure Component Value Units Date/Time    CT Head Without Contrast [[id number]] Collected: 06/25/24 0934     Updated: 06/25/24 0939    Narrative:      EXAM: CT HEAD WO CONTRAST-      DATE: 6/25/2024 8:19 AM     HISTORY: AMS; N39.0-Urinary tract infection, site not specified;  R41.82-Altered mental status, unspecified; S31.819S-Unspecified open  wound of right buttock, sequela; L72.9-Follicular cyst of the skin and  subcutaneous tissue, unspecified; R13.10-Dysphagia, unspecified;  Z74.09-Other reduced mobility; N13.2-Hydronephrosis with renal and  ureteral calculous obstruction; K81.0-Acute cholecystitis       COMPARISON: 6/12/2024.     DOSE LENGTH PRODUCT: 775.35 mGy.cm   Automated exposure control was also  utilized to decrease patient radiation dose.     TECHNIQUE: Unenhanced CT images obtained from vertex to skull base with  multiplanar reformats.     FINDINGS:  There is no acute intracranial hemorrhage, midline shift, mass effect,  or hydrocephalus. There is no CT evidence for acute infarct.     There are chronic changes with volume loss and chronic small vessel  ischemic change of the periventricular white matter.      SOFT TISSUES: The scalp soft tissues are unremarkable.        SINUS: There is mucosal thickening of the ethmoid air cells and sphenoid  sinus with complete opacification. There is also partial opacification  of the left mastoid air cells.     ORBITS: The visualized orbits and globes are unremarkable. There is  bilateral lens extraction.          Impression:      1. Chronic changes and no acute intracranial findings.   2. Paranasal sinus mucosal thickening and partial opacification of the  left mastoid air cells.     This report was signed and finalized on 6/25/2024 9:36 AM by Zander Motta.                  Assessment & Plan:     Tawny Shin is a 68 y.o. female with PMH RA, cardiac pacemaker, CAD, MI, thyroid disorder, DVT,  paraplegia secondary to injury, CHF, neurogenic bladder, possible underlying MCI. Patient admitted on 6/13/2024 with AMS. She was found to have UTI  and left ureteral calculus with obstruction and acute hypoxic respiratory failure. She has been treated with Cefepime. Lithotripsy performed on 6/21/2024. GI was consulted on 6/21/2024 for abdominal pain. GB US showed cholelithiasis with small stones and sludge. HIDA scan done showed nonvisualization of the gallbladder and could not rule out cystic duct obstruction. Patient was scheduled for cholecystectomy today however, early this AM, patient was noted to have aphasia and not speaking Stat CT head was done showing no acute process. Family is at  the bedside and assist with history.  Daughter tells me yesterday patient was not speaking much and was very weak and not able to drink form a straw and patient was getting liquids by dropping liquids from the straw.  NH3 14 yesterday. At time of my exam, patient is awake and alert. She is known to me and she appeared to recognize me. She was able to state her name but not able to state location or month even with clues. She correctly named 2/2 objects and she followed commands. Family states just prior to my arrival she began to speak in more complete sentences. I am told patient had similar episodes about 1-2 years ago while in [facility] and was encephalopathic at the time. EEG done 6/2022 was read as unremarkable.  MRI brain with and without 6/2022 also done during similar episode showed chronic small vessel disease and moderate dilatation of lateral and third ventricles secondary to atrophy.       Active Hospital Problems    Diagnosis  POA    **Altered mental status [R41.82]  Yes    Esophageal dysphagia [R13.19]  Yes    UTI (urinary tract infection) [N39.0]  Yes    Hydronephrosis with urinary obstruction due to ureteral calculus [N13.2]  Unknown    Acute cholecystitis [K81.0]  Unknown    Hypothyroidism [E03.9]  Yes    Stage 3b chronic kidney disease [N18.32]  Yes    Near functional paraplegia [G82.20]  Yes    Cholelithiasis [K80.20]  Yes    Essential hypertension [I10]  Yes    Sick sinus syndrome [I49.5]  Yes    Coronary artery disease involving native coronary artery of native heart without angina pectoris [I25.10]  Yes    Venous insufficiency [I87.2]  Yes    Rheumatoid arthritis involving multiple sites [M06.9]  Yes    Chronic heart failure with preserved ejection fraction [I50.32]  Yes    Class 1 obesity due to excess calories with serious comorbidity and body mass index (BMI) of 33.0 to 33.9 in adult [E66.09, Z68.33]  Not Applicable    Functional neurological symptom disorder with weakness or paralysis [F44.4]  Yes    Chronic anticoagulation  [Z79.01]  Not Applicable    Chronic embolism and thrombosis of unspecified deep veins of left lower extremity [I82.502]  Yes      Impression:  AMS  Aphasia, resolved. This could be multifactorial related to UTI, prolonged and repeated hospitalization or side effects of cefepime which can be neuro toxic (this was completed 6/24/2024),  with possible underlying MCI and with this suspicion, would see fluctuations in cognition and interactions. At this time symptoms not consistent with stroke/TIA or seizure.   UTI  Cholelithiasis.  Hydronephrosis with urinary obstruction of ureteral calculus s/p lithotripsy.  Paraplegia  Thyroid disorder.   Presence of cardiac pacemaker      Plan:   Check TSH, T4  Consider MRI brain but will defer at this time as patient has improved and is closer to baseline.  Cholecystectomy canceled for today. GI had wanted percutaneous tube placed but would recommend hold off on this and would recommend patient have at least 2 days at baseline before moving forward with invasive procedure or surgery.  OT/PT  Treatment of underlying infections per primary team.     Miriam Avery, APRN  06/25/24  13:18 CDT    Medical Decision Making    Number/Complexity of Problems  Moderate  1 undiagnosed new problem with uncertain prognosis -   1 acute illness with systemic symptoms -   High  1 acute or chronic illness that poses a threat to life/body function -   high     MDM Data  Moderate - 1/3 categories  Extensive - 2/3 categories    Category 1: 3 of the following  Review of external notes  Review of results  Ordering of each unique test  Independent historian  Category 2:  Independent interpretation of test (ex: imaging)  Category 3:  Discussion of management with another provider    Extensive  Discussed with Dr. Pfeiffer.      Treatment Plan  Moderate - Prescription Drug management  High  Drug therapy requiring intensive monitoring for toxicity  Decision regarding hospitalization or escalation of  care  De-escalate care/DNR decisions  high

## 2024-06-25 NOTE — PLAN OF CARE
Goal Outcome Evaluation:  Plan of Care Reviewed With: patient, family        Progress: no change  Outcome Evaluation: Pt NPO for surgery. Daughter at bedside. VSS. RA. IV abx given as scheduled. BM this shift. Safety maintained. Call light in reach.

## 2024-06-26 LAB
ANION GAP SERPL CALCULATED.3IONS-SCNC: 10 MMOL/L (ref 5–15)
BUN SERPL-MCNC: 14 MG/DL (ref 8–23)
BUN/CREAT SERPL: 13.1 (ref 7–25)
CALCIUM SPEC-SCNC: 8.5 MG/DL (ref 8.6–10.5)
CHLORIDE SERPL-SCNC: 105 MMOL/L (ref 98–107)
CO2 SERPL-SCNC: 25 MMOL/L (ref 22–29)
CREAT SERPL-MCNC: 1.07 MG/DL (ref 0.57–1)
DEPRECATED RDW RBC AUTO: 62.7 FL (ref 37–54)
EGFRCR SERPLBLD CKD-EPI 2021: 56 ML/MIN/1.73
ERYTHROCYTE [DISTWIDTH] IN BLOOD BY AUTOMATED COUNT: 18.5 % (ref 12.3–15.4)
GLUCOSE SERPL-MCNC: 88 MG/DL (ref 65–99)
HCT VFR BLD AUTO: 23.8 % (ref 34–46.6)
HGB BLD-MCNC: 7.2 G/DL (ref 12–15.9)
MCH RBC QN AUTO: 28.2 PG (ref 26.6–33)
MCHC RBC AUTO-ENTMCNC: 30.3 G/DL (ref 31.5–35.7)
MCV RBC AUTO: 93.3 FL (ref 79–97)
PLATELET # BLD AUTO: 150 10*3/MM3 (ref 140–450)
PMV BLD AUTO: 11.9 FL (ref 6–12)
POTASSIUM SERPL-SCNC: 3.5 MMOL/L (ref 3.5–5.2)
RBC # BLD AUTO: 2.55 10*6/MM3 (ref 3.77–5.28)
SODIUM SERPL-SCNC: 140 MMOL/L (ref 136–145)
WBC NRBC COR # BLD AUTO: 4.39 10*3/MM3 (ref 3.4–10.8)

## 2024-06-26 PROCEDURE — 99232 SBSQ HOSP IP/OBS MODERATE 35: CPT | Performed by: SURGERY

## 2024-06-26 PROCEDURE — 97110 THERAPEUTIC EXERCISES: CPT

## 2024-06-26 PROCEDURE — 97535 SELF CARE MNGMENT TRAINING: CPT

## 2024-06-26 PROCEDURE — 80048 BASIC METABOLIC PNL TOTAL CA: CPT | Performed by: FAMILY MEDICINE

## 2024-06-26 PROCEDURE — 97530 THERAPEUTIC ACTIVITIES: CPT

## 2024-06-26 PROCEDURE — 99232 SBSQ HOSP IP/OBS MODERATE 35: CPT | Performed by: UROLOGY

## 2024-06-26 PROCEDURE — 25010000002 ONDANSETRON PER 1 MG: Performed by: UROLOGY

## 2024-06-26 PROCEDURE — 99231 SBSQ HOSP IP/OBS SF/LOW 25: CPT | Performed by: INTERNAL MEDICINE

## 2024-06-26 PROCEDURE — 85027 COMPLETE CBC AUTOMATED: CPT | Performed by: UROLOGY

## 2024-06-26 RX ADMIN — OXYCODONE HYDROCHLORIDE AND ACETAMINOPHEN 500 MG: 500 TABLET ORAL at 08:52

## 2024-06-26 RX ADMIN — LEFLUNOMIDE 20 MG: 20 TABLET ORAL at 08:54

## 2024-06-26 RX ADMIN — CARVEDILOL 25 MG: 25 TABLET, FILM COATED ORAL at 08:52

## 2024-06-26 RX ADMIN — ESTRADIOL 2 APPLICATOR: 0.1 CREAM VAGINAL at 08:55

## 2024-06-26 RX ADMIN — FUROSEMIDE 40 MG: 40 TABLET ORAL at 08:53

## 2024-06-26 RX ADMIN — FOLIC ACID 1000 MCG: 1 TABLET ORAL at 08:52

## 2024-06-26 RX ADMIN — ONDANSETRON 4 MG: 2 INJECTION INTRAMUSCULAR; INTRAVENOUS at 09:06

## 2024-06-26 RX ADMIN — ISOSORBIDE MONONITRATE 60 MG: 60 TABLET, EXTENDED RELEASE ORAL at 08:50

## 2024-06-26 RX ADMIN — Medication 1 TABLET: at 08:54

## 2024-06-26 RX ADMIN — TRAMADOL HYDROCHLORIDE 50 MG: 50 TABLET ORAL at 19:50

## 2024-06-26 RX ADMIN — CARVEDILOL 25 MG: 25 TABLET, FILM COATED ORAL at 17:15

## 2024-06-26 RX ADMIN — ONDANSETRON 4 MG: 2 INJECTION INTRAMUSCULAR; INTRAVENOUS at 05:08

## 2024-06-26 RX ADMIN — FERROUS SULFATE TAB 325 MG (65 MG ELEMENTAL FE) 325 MG: 325 (65 FE) TAB at 08:54

## 2024-06-26 RX ADMIN — METHENAMINE HIPPURATE 1 G: 1000 TABLET ORAL at 08:49

## 2024-06-26 RX ADMIN — ASPIRIN 81 MG: 81 TABLET, COATED ORAL at 08:49

## 2024-06-26 RX ADMIN — METHENAMINE HIPPURATE 1 G: 1000 TABLET ORAL at 19:50

## 2024-06-26 RX ADMIN — DULOXETINE HYDROCHLORIDE 60 MG: 30 CAPSULE, DELAYED RELEASE ORAL at 08:53

## 2024-06-26 RX ADMIN — LEVOTHYROXINE SODIUM 75 MCG: 0.07 TABLET ORAL at 06:06

## 2024-06-26 RX ADMIN — SPIRONOLACTONE 25 MG: 25 TABLET ORAL at 08:54

## 2024-06-26 RX ADMIN — Medication 10 ML: at 08:55

## 2024-06-26 RX ADMIN — LOSARTAN POTASSIUM 50 MG: 50 TABLET, FILM COATED ORAL at 08:52

## 2024-06-26 RX ADMIN — PANTOPRAZOLE SODIUM 40 MG: 40 TABLET, DELAYED RELEASE ORAL at 08:54

## 2024-06-26 NOTE — PLAN OF CARE
Goal Outcome Evaluation:      Pt is alert, disoriented to time and situation. She has been more verbal today though does have word finding difficulty at times. No complaints of pain, turn q2 hrs. She has her kay and will have kay when discharged per MD note. Zofran given once for nausea and vomiting.

## 2024-06-26 NOTE — THERAPY TREATMENT NOTE
Acute Care - Physical Therapy Treatment Note  [facility]     Patient Name: Tawny Shin  : 1955  MRN: [id number]  Today's Date: 2024      Visit Dx:     ICD-10-CM ICD-9-CM   1. Acute UTI (urinary tract infection)  N39.0 599.0   2. Altered mental status, unspecified altered mental status type  R41.82 780.97   3. Open wound of right buttock, sequela  S31.819S 906.0   4. Cyst of buttocks  L72.9 706.2   5. Dysphagia, unspecified type  R13.10 787.20   6. Impaired mobility [Z74.09]  Z74.09 799.89   7. Hydronephrosis with urinary obstruction due to ureteral calculus  N13.2 592.1     591   8. Acute cholecystitis  K81.0 575.0     Patient Active Problem List   Diagnosis    T12 compression fracture, initial encounter    Chronic embolism and thrombosis of unspecified deep veins of left lower extremity    Chronic anticoagulation    Iron deficiency anemia    Osteoporosis    E coli bacteremia    Epidural hematoma    Pleural effusion, left    Functional neurological symptom disorder with weakness or paralysis    Class 1 obesity due to excess calories with serious comorbidity and body mass index (BMI) of 33.0 to 33.9 in adult    Rheumatoid arthritis involving multiple sites    Chronic heart failure with preserved ejection fraction    Venous insufficiency    Coronary artery disease involving native coronary artery of native heart without angina pectoris    Sick sinus syndrome    Essential hypertension    Pressure injury of skin of heel    Chronic pain    Anemia of chronic disease    Cholelithiasis    Pressure injury of skin of buttock    Near functional paraplegia    Skin cancer    Squamous cell carcinoma of back    Hematoma    Right-sided chest wall pain    Hyperlipidemia LDL goal <70    Malodorous urine    Stage 3b chronic kidney disease    Cyst of buttocks    Sepsis due to Escherichia coli without acute organ dysfunction    Generalized weakness    Paralysis    History of urinary retention    Bacteremia due to  "Enterobacter species    Bacteremia    Hypothyroidism    Abnormal CT of the abdomen    Presence of cardiac pacemaker    Altered mental status    UTI (urinary tract infection)    Esophageal dysphagia    Hydronephrosis with urinary obstruction due to ureteral calculus    Acute cholecystitis     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    Elevated cholesterol     Eustachian tube dysfunction     Heart disease     Herpes simplex     History of transfusion     Hyperlipidemia     Hypertension     Hypothyroidism 12/23/2020    Intrinsic asthma 12/23/2020    Knee dislocation     Labral tear of right hip joint     Laryngitis sicca     Laryngitis, chronic     Left carotid bruit 03/09/2016    MI (myocardial infarction)     Myalgia due to statin 06/25/2019    Open wound of right hip 09/14/2021    Osteomyelitis of right femur 07/06/2021    Otorrhea     Pacemaker 11/17/2016    Primary osteoarthritis of left knee 12/23/2020    Psoriasis vulgaris 12/23/2020    S/P coronary artery stent placement 03/09/2016    Sensorineural hearing loss     Seropositive rheumatoid arthritis of multiple sites 12/27/2019    Overview:  -myochrysine '93-'96 -methotrexate '96--->11/98;r/s  restarted 2/99--> 8/14 (anemia) -sulfasalazine- not effective -penicillamine 6/98-->10/98; no effect -leflunomide 11/98--> - Humira '13-->didn't take - Enbrel 12/14-->3/15- no effect!   Last Assessment & Plan:  - \"aching all over\" because she had to be " off her anti-rheumatic drugs for 2 weeks in preparation for her R knee surgery - he    Sick sinus syndrome 12/27/2019    Sjogren's disease     Spondylolisthesis of lumbar region 01/17/2018    Syncope, recurrent 02/08/2021    Urinary tract infection     UTI (urinary tract infection) 04/19/2024     Past Surgical History:   Procedure Laterality Date    A-V CARDIAC PACEMAKER INSERTION  2016    ATRIAL CARDIAC PACEMAKER INSERTION      CARDIAC CATHETERIZATION      CATARACT EXTRACTION      CERVICAL CORPECTOMY N/A 3/3/2021    Procedure: CERVICAL 6 CORPECTOMY WITH TITANIUM CAGE WITH NEURO MONITORING;  Surgeon: Bandar Shea MD;  Location:  [facility];  Service: Neurosurgery;  Laterality: N/A;    COLONOSCOPY  11/08/2011    One fold in the ascending colon which showed ulcer otherwise normal exam    COLONOSCOPY  11/12/2004    Normal exam repeat in five years    CORONARY ANGIOPLASTY WITH STENT PLACEMENT      X 2; 2013 & 2014    ENDOSCOPY  07/10/2014    Normal exam    ENDOSCOPY N/A 5/30/2024    Procedure: ESOPHAGOGASTRODUODENOSCOPY WITH ANESTHESIA;  Surgeon: Taiwo Sanchez MD;  Location: [facility] ENDOSCOPY;  Service: Gastroenterology;  Laterality: N/A;  preop; dysphagia  postop: balloon dilation  PCP Jesus Manuel jeff    FLAP LEG Right 9/14/2021    Procedure: RIGHT GLUTEAL FASCIOCUTANEOUS ADVANCEMENT FLAP AND RIGHT TENSOR FASCIAL JESSICA FLAP;  Surgeon: Amadeo Turner MD;  Location:  [facility];  Service: Plastics;  Laterality: Right;    HIP ABDUCTION TENOTOMY BILATERAL Right 1/14/2021    Procedure: RIGHT HIP GLUTEUS MEDLUS / MINIMUS REPAIR, POSSIBLE ACHILLES ALLOGRAFT;  Surgeon: Nino Carlson MD;  Location:  [facility];  Service: Orthopedics;  Laterality: Right;    INCISION AND DRAINAGE ABSCESS Right 6/4/2022    Procedure: INCISION AND DRAINAGE ABSCESS right hip;  Surgeon: Magda Salcido MD;  Location:  [facility];  Service: General;  Laterality: Right;    INCISION AND DRAINAGE ABSCESS Right 6/10/2022    Procedure: RIGHT HIP  INCISION AND DRAINAGE. MD NEEDS 3L VANC IRRIGATION, CURRETTES, DAICANS, KERLEX ROLLS;  Surgeon: Amadeo Turner MD;  Location:  [facility];  Service: Plastics;  Laterality: Right;    INCISION AND DRAINAGE HIP Right 2/9/2021    Procedure: HIP INCISION AND DRAINAGE;  Surgeon: Nino Carlson MD;  Location:  [facility];  Service: Orthopedics;  Laterality: Right;    INCISION AND DRAINAGE LEG Right 10/24/2021    Procedure: INCISION AND DRAINAGE LOWER EXTREMITY;  Surgeon: Amadeo Turner MD;  Location:  [facility];  Service: Plastics;  Laterality: Right;    INCISION AND DRAINAGE OF WOUND Right 7/8/2021    Procedure: INCISION AND DRAINAGE WOUND RIGHT HIP;  Surgeon: James Huntley MD;  Location:  [facility];  Service: Orthopedics;  Laterality: Right;    JOINT REPLACEMENT      KYPHOPLASTY WITH BIOPSY Bilateral 10/26/2021    Procedure: THOARCIC 12 KYPHOPLASTY WITH BIOPSY;  Surgeon: Bandar Shea MD;  Location:  [facility];  Service: Neurosurgery;  Laterality: Bilateral;    LEG DEBRIDEMENT Right 9/14/2021    Procedure: DEBRIDEMENT OF RIGHT HIP WOUND, RIGHT GLUTEAL FASCIOCUTANEOUS ADVANCEMENT FLAP AND RIGHT TENSOR FASCIAL JESSICA FLAP;  Surgeon: Amadeo Turner MD;  Location:  [facility];  Service: Plastics;  Laterality: Right;    LUMBAR DISCECTOMY Right 3/23/2021    Procedure: LUMBAR DISCECTOMY MICRO, Lumbar 1/2 right;  Surgeon: Bandar Shea MD;  Location:  [facility];  Service: Neurosurgery;  Laterality: Right;    LUMBAR FUSION N/A 1/19/2018    Procedure: L3-4,L4-5 DECOMPRESSION, POSTERIOR SPINAL FUSION WITH INSTRUMENTATION;  Surgeon: Fortino Oropeza MD;  Location:  [facility];  Service:     LUMBAR LAMINECTOMY WITH FUSION Left 1/17/2018    Procedure: LEFT L3-4 L4-5 LATERAL LUMBAR INTERBODY FUSION;  Surgeon: Fortino Oropeza MD;  Location:  [facility];  Service:     MASS EXCISION Right 4/23/2024    Procedure: RIGHT BUTTOCK MASS EXCISION;  Surgeon: Moises Keyes MD;  Location:  [facility];  Service: General;   Laterality: Right;    MYRINGOTOMY W/ TUBES  09/04/2014    TUBES NO LONGER IN PLACE    OTHER SURGICAL HISTORY      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 so hardware was removed and spacers were placed    REPLACEMENT TOTAL KNEE Right     URETEROSCOPY LASER LITHOTRIPSY WITH STENT INSERTION N/A 6/21/2024    Procedure: URETEROSCOPY LASER LITHOTRIPSY WITH STENT INSERTION LEFT;  Surgeon: Ky Plascencia MD;  Location: [facility];  Service: Urology;  Laterality: N/A;     PT Assessment (Last 12 Hours)       PT Evaluation and Treatment       Row Name 06/26/24 1326          Physical Therapy Time and Intention    Subjective Information no complaints  -KJ     Document Type therapy note (daily note)  -KJ     Mode of Treatment physical therapy  -KJ     Patient Effort good  -KJ       Row Name 06/26/24 1326          General Information    Existing Precautions/Restrictions fall  -KJ       Row Name 06/26/24 1326          Pain    Pretreatment Pain Rating 0/10 - no pain  -KJ     Posttreatment Pain Rating 0/10 - no pain  -KJ       [facility] Name 06/26/24 1326          Bed Mobility    Supine-Sit [place] (Bed Mobility) moderate assist (50% patient effort);verbal cues  -KJ     Sit-Supine [place] (Bed Mobility) moderate assist (50% patient effort);verbal cues  -KJ       [facility] Name 06/26/24 1326          Sit-Stand Transfer    Sit-Stand [place] (Transfers) verbal cues;moderate assist (50% patient effort);2 person assist  -KJ     Assistive Device (Sit-Stand Transfers) walker, front-wheeled  -Outbox       Row Name 06/26/24 1326          Stand-Sit Transfer    Stand-Sit [place] (Transfers) verbal cues;minimum assist (75% patient effort);2 person assist  -KJ     Assistive Device (Stand-Sit Transfers) walker, front-wheeled  -Outbox       [facility] Name 06/26/24 1326          Gait/Stairs (Locomotion)    Comment, (Gait/Stairs) Stood x 4 at bedside; too weak to take steps  -Outbox       [facility] Name 06/26/24 1326          Motor Skills    Comments, Therapeutic Exercise  A/AAROM BLE  -KJ       Row Name             Wound 04/23/24 0936 Right gluteal Incision    Wound - Properties Group Placement Date: 04/23/24  -EP Placement Time: 0936  -EP Present on Original Admission: N  -EP Side: Right  -EP Location: gluteal  -EP Primary Wound Type: Incision  -EP    Retired Wound - Properties Group Placement Date: 04/23/24  -EP Placement Time: 0936  -EP Present on Original Admission: N  -EP Side: Right  -EP Location: gluteal  -EP Primary Wound Type: Incision  -EP    Retired Wound - Properties Group Date first assessed: 04/23/24  -EP Time first assessed: 0936  -EP Present on Original Admission: N  -EP Side: Right  -EP Location: gluteal  -EP Primary Wound Type: Incision  -EP      Row Name 06/26/24 1326          Positioning and Restraints    Pre-Treatment Position in bed  -KJ     Post Treatment Position bed  -KJ               User Key  (r) = Recorded By, (t) = Taken By, (c) = Cosigned By      Initials Name Provider Type    Emilee Gloria PTA Physical Therapist Assistant    Sonny Thompson RN Registered Nurse                    Physical Therapy Education       Title: PT OT SLP Therapies (Done)       Topic: Physical Therapy (Done)       Point: Mobility training (Done)       Learning Progress Summary             Patient Acceptance, E, VU by KS at 6/20/2024 1715    Acceptance, E, VU by MS at 6/18/2024 1502    Comment: role of PT in her care                         Point: Home exercise program (Done)       Learning Progress Summary             Patient Acceptance, E, VU by KS at 6/20/2024 1715    Acceptance, E,D,TB, VU,DU,NR by  at 6/15/2024 1514    Acceptance, E,TB,D, VU,DU,NR by  at 6/14/2024 1550                         Point: Body mechanics (Done)       Learning Progress Summary             Patient Acceptance, E, VU by KS at 6/20/2024 1715    Acceptance, E,D,TB, VU,DU,NR by  at 6/15/2024 1514    Acceptance, E,TB,D, VU,DU,NR by  at 6/14/2024 1550                         Point: Precautions  (Done)       Learning Progress Summary             Patient Acceptance, E, VU by KS at 6/20/2024 1715    Acceptance, E,D,TB, VU,DU,NR by ROGERS at 6/15/2024 1514    Acceptance, E,TB,D, VU,DU,NR by  at 6/14/2024 1550                                         User Key       Initials Effective Dates Name Provider Type Discipline    MS 07/11/23 -  Nicole Olson, PT, DPT, NCS Physical Therapist PT    KS 02/27/23 -  Ewelina Morales, RN Registered Nurse Nurse     10/17/23 -  Stephanie Doll, RN Registered Nurse Nurse                  PT Recommendation and Plan     Plan of Care Reviewed With: patient  Progress: improving  Outcome Evaluation: PT tx completed. Pt more awake and alert this pm. Answered all quesetions and followed commands <75%. Min/ModA bed mobility, sit edge of bed S several minutes. Partial stand ModA x 2 utilizing rwx. A/AAROM BLE. Recommend rehab/SNF   Outcome Measures       Row Name 06/26/24 1400 06/26/24 1300 06/25/24 1400       How much help from another person do you currently need...    Turning from your back to your side while in flat bed without using bedrails? 2  -KJ -- 2  -KJ    Moving from lying on back to sitting on the side of a flat bed without bedrails? 2  -KJ -- 2  -KJ    Moving to and from a bed to a chair (including a wheelchair)? 2  -KJ -- 2  -KJ    Standing up from a chair using your arms (e.g., wheelchair, bedside chair)? 1  -KJ -- 2  -KJ    Climbing 3-5 steps with a railing? 1  -KJ -- 1  -KJ    To walk in hospital room? 1  -KJ -- 1  -KJ    AM-PAC 6 Clicks Score (PT) 9  -KJ -- 10  -KJ    Highest Level of Mobility Goal 3 --> Sit at edge of bed  -KJ -- 4 --> Transfer to chair/commode  -KJ       How much help from another is currently needed...    Putting on and taking off regular lower body clothing? -- 2  -TS --    Bathing (including washing, rinsing, and drying) -- 2  -TS --    Toileting (which includes using toilet bed pan or urinal) -- 2  -TS --    Putting on and taking off regular  upper body clothing -- 2  -TS --    Taking care of personal grooming (such as brushing teeth) -- 3  -TS --    Eating meals -- 3  -TS --    AM-PAC 6 Clicks Score (OT) -- 14  -TS --       Functional Assessment    Outcome Measure Options AM-[facility] 6 Clicks Basic Mobility (PT)  -KJ -- AM-[facility] 6 Clicks Basic Mobility (PT)  -KJ      Row Name 06/24/24 1400             How much help from another person do you currently need...    Turning from your back to your side while in flat bed without using bedrails? 1  -KJ      Moving from lying on back to sitting on the side of a flat bed without bedrails? 1  -KJ      Moving to and from a bed to a chair (including a wheelchair)? 1  -KJ      Standing up from a chair using your arms (e.g., wheelchair, bedside chair)? 1  -KJ      Climbing 3-5 steps with a railing? 1  -KJ      To walk in hospital room? 1  -KJ      -[facility] 6 Clicks Score (PT) 6  -KJ      Highest Level of Mobility Goal 2 --> Bed activities/dependent transfer  -KJ         Functional Assessment    Outcome Measure Options -[facility] 6 Clicks Basic Mobility (PT)  -KJ                User Key  (r) = Recorded By, (t) = Taken By, (c) = Cosigned By      Initials Name Provider Type    Emilee Gloria PTA Physical Therapist Assistant    Carolee Roger COTA Occupational Therapist Assistant                     Time Calculation:    PT Charges       Row Name 06/26/24 1403             Time Calculation    Start Time 1326  -KJ      Stop Time 1404  -KJ      Time Calculation (min) 38 min  -KJ      PT Received On 06/26/24  -KJ      PT Goal Re-Cert Due Date 06/28/24  -KJ         Time Calculation- PT    Total Timed Code Minutes- PT 38 minute(s)  -KJ                User Key  (r) = Recorded By, (t) = Taken By, (c) = Cosigned By      Initials Name Provider Type    Emilee Gloria PTA Physical Therapist Assistant                  Therapy Charges for Today       Code Description Service Date Service Provider Modifiers Qty    34428983119   PT THER PROC EA 15 MIN 6/25/2024 Emilee Paul, PTA GP 1    45757474627 HC PT THERAPEUTIC ACT EA 15 MIN 6/25/2024 Emilee Paul, PTA GP 2    39958715190 HC PT THER PROC EA 15 MIN 6/26/2024 Emilee Paul, PTA GP 1    61473906160 HC PT THERAPEUTIC ACT EA 15 MIN 6/26/2024 Emilee Paul, PTA GP 2            PT G-Codes  Outcome Measure Options: AM-PAC 6 Clicks Basic Mobility (PT)  AM-PAC 6 Clicks Score (PT): 9  AM-PAC 6 Clicks Score (OT): 14    Emilee Paul PTA  6/26/2024

## 2024-06-26 NOTE — PROGRESS NOTES
[facility] Medicine Services  INPATIENT PROGRESS NOTE    Patient Name: Tawny Shin  Date of Admission: 6/12/2024  Today's Date: 06/26/24  Length of Stay: 5  Primary Care Physician: Moises Oliveira MD    Subjective   Chief Complaint: Recurrent UTI, gallstone, kidney stone   HPI   No new event reported today.  She is resting comfortably.  She has worked with physical and Occupational Therapy.    Review of Systems    Negative for chills and fever.   HENT:  Negative for hearing loss, nosebleeds, tinnitus and trouble swallowing.    Eyes:  Negative for visual disturbance.   Respiratory:  Negative for cough, chest tightness, shortness of breath and wheezing.    Cardiovascular:  Negative for chest pain, palpitations and leg swelling.   Gastrointestinal:  Negative for abdominal distention, abdominal pain, blood in stool, constipation, diarrhea, nausea and vomiting.   Endocrine: Negative for cold intolerance, heat intolerance, polydipsia, polyphagia and polyuria.   Genitourinary:  Negative for decreased urine volume, difficulty urinating, dysuria, flank pain, frequency and hematuria.   Musculoskeletal:  Positive for arthralgias, gait problem and myalgias. Negative for joint swelling.   Skin:  Negative for rash.   Allergic/Immunologic: Negative for immunocompromised state.   Neurological:  Positive for weakness. Negative for dizziness, syncope, light-headedness and headaches.   Hematological:  Negative for adenopathy. Does not bruise/bleed easily.   All pertinent negatives and positives are as above. All other systems have been reviewed and are negative unless otherwise stated.     Objective    Temp:  [97.1 °F (36.2 °C)-98.3 °F (36.8 °C)] 97.1 °F (36.2 °C)  Heart Rate:  [73-82] 77  Resp:  [16-18] 16  BP: (109-152)/(43-59) 152/44  Physical Exam  Vitals and nursing note reviewed.   Constitutional:       Comments: Chronically ill.  Deconditioning.   HENT:      Head: Normocephalic.    Eyes:      Conjunctiva/sclera: Conjunctivae normal.      Pupils: Pupils are equal, round, and reactive to light.   Cardiovascular:      Rate and Rhythm: Normal rate and regular rhythm.      Heart sounds: Normal heart sounds.   Pulmonary:      Effort: Pulmonary effort is normal. No respiratory distress.      Breath sounds: Normal breath sounds.   Abdominal:      General: Bowel sounds are normal. There is no distension.      Palpations: Abdomen is soft.      Comments: Obesity.  No tenderness noted with palpation.   Musculoskeletal:         General: No swelling.      Cervical back: Neck supple.   Skin:     General: Skin is warm and dry.      Capillary Refill: Capillary refill takes 2 to 3 seconds.      Findings: No rash.   Neurological:      Mental Status: She is alert and oriented to person, place, and time.      Motor: Weakness present.      Coordination: Coordination abnormal.      Gait: Gait abnormal.   Psychiatric:         Mood and Affect: Mood normal.         Behavior: Behavior normal.         Thought Content: Thought content normal.          Results Review:  I have reviewed the labs, radiology results, and diagnostic studies.    Laboratory Data:   Results from last 7 days   Lab Units 06/26/24  0338 06/25/24  0503 06/24/24  0437   WBC 10*3/mm3 4.39 4.18 4.74   HEMOGLOBIN g/dL 7.2* 7.4* 7.8*   HEMATOCRIT % 23.8* 24.6* 26.1*   PLATELETS 10*3/mm3 150 151 175        Results from last 7 days   Lab Units 06/26/24  0338 06/25/24  0503 06/24/24  0437 06/23/24  0428 06/22/24  0453   SODIUM mmol/L 140 142 138   < > 138   POTASSIUM mmol/L 3.5 3.9 3.8   < > 4.2   CHLORIDE mmol/L 105 106 105   < > 103   CO2 mmol/L 25.0 26.0 25.0   < > 28.0   BUN mg/dL 14 14 12   < > 16   CREATININE mg/dL 1.07* 0.98 0.80   < > 0.93   CALCIUM mg/dL 8.5* 8.5* 8.6   < > 9.1   BILIRUBIN mg/dL  --   --   --   --  0.4   ALK PHOS U/L  --   --   --   --  81   ALT (SGPT) U/L  --   --   --   --  18   AST (SGOT) U/L  --   --   --   --  19   GLUCOSE  mg/dL 88 88 98   < > 99    < > = values in this interval not displayed.       Culture Data:   Urine Culture   Date Value Ref Range Status   06/21/2024 No growth  Final   06/16/2024 25,000 CFU/mL Enterobacter cloacae complex CRE (A)  Final     Comment:       Consider infectious disease consult.  Susceptibility results may not correlate to clinical outcomes.  Carbapenem resistant Enterobacteriaceae, patient may be an isolation risk.  No carbapenemase detected.       Radiology Data:   Imaging Results (Last 24 Hours)       ** No results found for the last 24 hours. **            I have reviewed the patient's current medications.     Assessment/Plan   Assessment  Active Hospital Problems    Diagnosis     **Altered mental status     Esophageal dysphagia     UTI (urinary tract infection)     Hydronephrosis with urinary obstruction due to ureteral calculus     Acute cholecystitis     Hypothyroidism     Stage 3b chronic kidney disease     Near functional paraplegia     Cholelithiasis     Essential hypertension     Sick sinus syndrome     Coronary artery disease involving native coronary artery of native heart without angina pectoris     Venous insufficiency     Rheumatoid arthritis involving multiple sites     Chronic heart failure with preserved ejection fraction     Class 1 obesity due to excess calories with serious comorbidity and body mass index (BMI) of 33.0 to 33.9 in adult     Functional neurological symptom disorder with weakness or paralysis     Chronic anticoagulation     Chronic embolism and thrombosis of unspecified deep veins of left lower extremity        Treatment Plan  Recurrent UTI/neurogenic bladder.  Cefepime/Flagyl antibiotics.  Continue HipRex.  Repeated UA, negative for bacteria.  Consult ID.  Urology consulted and following.   CT scan abdomen pelvic . Obstructive uropathy on the left with mild to moderate dilatation of the upper tracts of the left kidney and left ureter into the true pelvis where  there is a 5 x 6 mm calculus within the left ureter approximately 4 to 5 cm above the UVJ- right ureter is decompressed and normal in appearance- No evidence of renal mass. No perinephric fluid collection present, gallbladder is mildly distended- gallstones within the gallbladder neck,  No pericholecystic inflammatory changes or biliary dilatation, Constipation,  No obstruction or free air, Normal appendix, Calcified fibroid within the lower uterine segment, Mild constipation, No obstruction or free air, Normal appendix, Small fat-containing periumbilical hernia, Previous kyphoplasty at T12, Previous fusion at L3-L4 and L4-L5, Left basilar atelectasis..  Recommendation from urology-evaluation of neurogenic bladder at a university setting like [place].     Obstructing renal calculi left side/moderate dilated of left ureter.  Callus 5 x 6 mm and left 4 to 5 cm above the UVJ junction.  Status post 6/21/2024 urethroscope laser lithotripsy with stent insertion left. Horan in place.     Gallstone, within the gallbladder neck/dysfunctional gallbladder.  Mild distended gallbladder.  Right upper quadrant tenderness.  Ultrasound of the gallbladder-Cholelithiasis with small shadowing stones and sludge within the region of the gallbladder neck- Moderate distention of the gallbladder- however no gallbladder wall thickening or pericholecystic fluid identified- No biliary dilatation.  HIDA scan-Nonvisualization of the gallbladder on images obtained up to 90 minutes- Cystic duct obstruction and acute cholecystitis cannot be excluded on the basis of this exam,  No evidence of common bile duct obstruction with emptying of activity into the C-loop of the duodenum and proximal jejunum, Gallstones and sludge were demonstrated on recent ultrasound- combined with nonvisualization of the gallbladder exclude performance of an ejection fraction..  Discussed with general surgery Dr. Jo-plan for gallbladder removal this coming Tuesday,  DC Eliquis today 6/22/2024 start Lovenox today, nursing communication to stop Lovenox 24 hours before surgery on Monday.  Dr. Jo also recommended cardiac clearance for surgery. Cardiology states okay to DC Lovenox today.    Cholecystectomy delay due to waxing and waning encephalopathy and concern for further cognitive decline.  General surgery considering referral for interventional radiologist to percutaneous drain.     Reflux/esophageal dysphagia. Dysphagia recent esophageal stretching, GI evaluated and recommended to continue outpatient follow-up.  SLP evaluated and recommended to continue regular diet.  Protonix . Zofran as needed.     Altered mental status/encephalopathy.  Consult neurology  She does not seem to receive anything overnight that could cause worsening mental status changes.  Metronidazole has an incidence of encephalopathy but average is a 28 days and worsening patients with liver disease which makes shortness seem to have at this point.  CT scan the head-No acute intracranial abnormality, Chronic sinusitis and left mastoid effusion.  Repeat this morning.  Medications reviewed  Chest x-ray-Stable chest exam without acute process, No visualized infiltrate.   Patient is on room air.     Hypothyroidism.  Synthroid.     Chronic stage IIIb renal failure.  Creatinine normalized.     Hypomagnesia. Resolved.     Hyponatremia.  Resolved.     Hypokalemia.  Resolved.  Magnesium normal.     Hypertension/sick sinus syndrome/venous insufficiency/CHF.  Eliquis.  Aspirin . Coreg . Lasix.  Imdur.  Cozaar . Aldactone.  Nitro as needed.     Right gluteal wound.  Consult wound care.     Rheumatoid arthritis. Arava.     Depression/anxiety.  Cymbalta.     Postmenopausal.  Estradiol cream.     Anemia.  Hemoglobin decreased.  Iron sulfate.  Folic acid.  No sign of acute bleed.     Pain . lidocaine patch.  Voltaren gel.  Ultram as needed.     Chronic DVT.  DC Eliquis due to possible surgery, on Lovenox.    Functional  neurological symptom disorder with paraplegia.     Nutrition.  Cardiac diet.  Vitamin C.  Multivitamins.  Regular/house diet.  Boost supplement.     Deconditioning.  PT and OT consult.  Patient use a walker and bedbound at baseline.     Urine culture-100,000 CFU/mL Enterobacter cloacae complex  25,000 CFU/mL Enterobacter cloacae complex CRE   Blood culture-no growth for 5 days.     Dr. Shin's mom.  Patient already have home health at home.  Patient request for lift at discharge.     Medical Decision Making  Number and Complexity of problems: Recurrent UTI/altered mental status/reflux/rheumatoid arthritis/chronic 3B renal failure  Differential Diagnosis: None     Conditions and Status        Condition is unchanged.     MDM Data  External documents reviewed: Previous note .  Cardiac tracing (EKG, telemetry) interpretation: Sinus.  Radiology interpretation: X-ray/CT scan of the head  Labs reviewed: Laboratory  Any tests that were considered but not ordered: Lab in a.m.     Decision rules/scores evaluated (example [id number], [place], etc): None     Discussed with: Patient and family     Care Planning  Shared decision making: Patient and family  Code status and discussions: DNR     Disposition  Social Determinants of Health that impact treatment or disposition: From home  1 to 3 days.    Electronically signed by Kris Cade MD, 06/26/24, 14:41 CDT.

## 2024-06-26 NOTE — PLAN OF CARE
Goal Outcome Evaluation:  Plan of Care Reviewed With: patient        Progress: no change       Pt A/O to self this morning and A/O to self, place and situation this afternoon. VSS. Pt c/o headache, prn medication given with some relief of symptoms. Weight shift assistance provided as needed. Meds crushed in pudding. Bed alarm set. Call light within reach. Plan of care ongoing. No neuro changes this shift.

## 2024-06-26 NOTE — PROGRESS NOTES
LOS: 5 days   Patient Care Team:  Moises Oliveira MD as PCP - General (Internal Medicine)  Moises Holman MD as Consulting Physician (Otolaryngology)  Christiano Rao PA as Physician Assistant (Otolaryngology)  Candelaria Davdi MD as Obstetrician (Obstetrics and Gynecology)  Omar Hernandez MD as Cardiologist (Cardiology)  Leta Crawford APRN as Nurse Practitioner (Nurse Practitioner)  Leta Crawford APRN as Nurse Practitioner (Nurse Practitioner)    Chief Complaint:  UTI, stone    Subjective     Interval History:     Ms. Shin is much more talkative and interactive today.     We had a 20 minute conversation regarding UTI, incomplete emptying and NGB, UTI and stone, bladder emptying.     Review of Systems  Pertinent items are noted in HPI, all other systems reviewed and negative     Objective     Vital Signs  Temp:  [97.9 °F (36.6 °C)-98.3 °F (36.8 °C)] 97.9 °F (36.6 °C)  Heart Rate:  [73-78] 73  Resp:  [18] 18  BP: (109-129)/(43-59) 115/50    Physical Exam:  Stable clinical exam. Horan in place.     Data Review:       I have reviewed the following data:    CBC (No Diff) (06/26/2024 03:38)   Basic Metabolic Panel (06/26/2024 03:38)     Medication Review:     Current Facility-Administered Medications:     acetaminophen (TYLENOL) tablet 650 mg, 650 mg, Oral, Q6H PRN, Ky Plascencia MD, 650 mg at 06/19/24 1403    ascorbic acid (VITAMIN C) tablet 500 mg, 500 mg, Oral, Daily, Ky Plascencia MD, 500 mg at 06/26/24 0852    aspirin EC tablet 81 mg, 81 mg, Oral, Daily, Ky Plascencia MD, 81 mg at 06/26/24 0849    sennosides-docusate (PERICOLACE) 8.6-50 MG per tablet 2 tablet, 2 tablet, Oral, BID PRN **AND** polyethylene glycol (MIRALAX) packet 17 g, 17 g, Oral, Daily PRN, 17 g at 06/15/24 1553 **AND** bisacodyl (DULCOLAX) EC tablet 5 mg, 5 mg, Oral, Daily PRN **AND** bisacodyl (DULCOLAX) suppository 10 mg, 10 mg, Rectal, Daily PRN, Ky Plascencia MD    Calcium Replacement - Follow Nurse /  BPA Driven Protocol, , Does not apply, PRN, Ky Plascencia MD    carvedilol (COREG) tablet 25 mg, 25 mg, Oral, BID With Meals, Ky Plascencia MD, 25 mg at 06/26/24 0852    Diclofenac Sodium (VOLTAREN) 1 % gel 4 g, 4 g, Topical, 4x Daily PRN, Ky Plascencia MD    DULoxetine (CYMBALTA) DR capsule 60 mg, 60 mg, Oral, Daily, Ky Plascencia MD, 60 mg at 06/26/24 0853    estradiol (ESTRACE) vaginal cream 2 applicator, 2 g, Vaginal, Daily, Ky Plascencia MD, 2 applicator at 06/26/24 0855    ferrous sulfate tablet 325 mg, 325 mg, Oral, Daily With Breakfast, Ky Plascencia MD, 325 mg at 06/26/24 0854    folic acid (FOLVITE) tablet 1,000 mcg, 1,000 mcg, Oral, Daily, Ky Plascencia MD, 1,000 mcg at 06/26/24 0852    furosemide (LASIX) tablet 40 mg, 40 mg, Oral, Daily, Ky Plascencia MD, 40 mg at 06/26/24 0853    isosorbide mononitrate (IMDUR) 24 hr tablet 60 mg, 60 mg, Oral, Daily, Ky Plascencia MD, 60 mg at 06/26/24 0850    leflunomide (ARAVA) tablet 20 mg, 20 mg, Oral, Daily, Ky Plascencia MD, 20 mg at 06/26/24 0854    levothyroxine (SYNTHROID, LEVOTHROID) tablet 75 mcg, 75 mcg, Oral, Q AM, Ky Plascencia MD, 75 mcg at 06/26/24 0606    Lidocaine 4 % 1 patch, 1 patch, Transdermal, Daily PRN, Ky Plascencia MD    losartan (COZAAR) tablet 50 mg, 50 mg, Oral, Daily, Ky Plascencia MD, 50 mg at 06/26/24 0852    Magnesium Low Dose Replacement - Follow Nurse / BPA Driven Protocol, , Does not apply, PRN, Ky Plascencia MD    methenamine (HIPREX) tablet 1 g, 1 g, Oral, BID, Ky Plascencia MD, 1 g at 06/26/24 0849    multivitamin with minerals 1 tablet, 1 tablet, Oral, Daily, Ky Plascencia MD, 1 tablet at 06/26/24 0854    [DISCONTINUED] Morphine sulfate (PF) injection 1 mg, 1 mg, Intravenous, Q4H PRN **AND** naloxone (NARCAN) injection 0.4 mg, 0.4 mg, Intravenous, Q5 Min PRN, Ky Plascencia MD    nitroglycerin (NITROSTAT) SL tablet 0.4 mg, 0.4 mg, Sublingual, Q5 Min PRN, Temo  Ky BECKMAN MD    ondansetron (ZOFRAN) injection 4 mg, 4 mg, Intravenous, Q6H PRN, Ky Plascencia MD, 4 mg at 06/26/24 0906    pantoprazole (PROTONIX) EC tablet 40 mg, 40 mg, Oral, Daily, Ky Plascencia MD, 40 mg at 06/26/24 0854    Phosphorus Replacement - Follow Nurse / BPA Driven Protocol, , Does not apply, Temo PALACIO Donald L, MD    Potassium Replacement - Follow Nurse / BPA Driven Protocol, , Does not apply, PRNTemo Donald L, MD    sodium chloride 0.9 % flush 10 mL, 10 mL, Intravenous, PRN, Ky Plascencia MD, 10 mL at 06/12/24 2200    sodium chloride 0.9 % flush 10 mL, 10 mL, Intravenous, Q12H, Ky Plascencia MD, 10 mL at 06/26/24 0855    sodium chloride 0.9 % flush 10 mL, 10 mL, Intravenous, PRNTemo Donald L, MD    sodium chloride 0.9 % infusion 40 mL, 40 mL, Intravenous, PRN, Ky Plascencia MD, 40 mL at 06/25/24 1144    spironolactone (ALDACTONE) tablet 25 mg, 25 mg, Oral, Daily, Ky Plascencia MD, 25 mg at 06/26/24 0854    traMADol (ULTRAM) tablet 50 mg, 50 mg, Oral, Q12H PRN, Ky Plascencia MD, 50 mg at 06/25/24 1951    Assessment and Plan:    Left ureter stone: POD#5. Continue Horan and stent today. Consider stent out tomorrow if creatinine normalizes.    Recurrent UTI: ID following. Defer current management to them. Recurrences could be from stone - will need to re-evaluate now that stone has been addressed.      URO DISPO: Recommend continuing Horan at discharge. Follow up in 2 weeks for possible voiding trial. Message sent to urology .       I discussed the patient's findings and my recommendations with patient and family    (Please note that portions of this note were completed with a voice recognition program.)    Albaro Dixon MD  06/26/24  09:32 CDT    Time: Time spent: 25 minutes spent performing evaluation and management, chart review, and discussion with patient, > 50% of time spent in face-to-face encounter

## 2024-06-26 NOTE — PROGRESS NOTES
INFECTIOUS DISEASES PROGRESS NOTE    Patient:  Tawny Shin  YOB: 1955  MRN: [id number]   Admit date: 6/12/2024   Admitting Physician: Kris Cade,*  Primary Care Physician: Moises Oliveira MD    Chief Complaint: Offers no complaints    Interval History: Patient sitting up at bedside eating breakfast.  Caregiver at bedside.  She notes that she is talking more and eating a bit better today.    Cholecystectomy canceled yesterday.  Patient had CT of the brain that showed no acute change    Allergies:   Allergies   Allergen Reactions    Atorvastatin Other (See Comments)     LEG CRAMPS      Amoxicillin Rash    Escitalopram Rash    Nabumetone Rash    Niacin Er Rash    Penicillin G Rash    Penicillins Rash    Simvastatin Rash       Current Scheduled Medications:   ascorbic acid, 500 mg, Oral, Daily  aspirin, 81 mg, Oral, Daily  carvedilol, 25 mg, Oral, BID With Meals  DULoxetine, 60 mg, Oral, Daily  estradiol, 2 g, Vaginal, Daily  ferrous sulfate, 325 mg, Oral, Daily With Breakfast  folic acid, 1,000 mcg, Oral, Daily  furosemide, 40 mg, Oral, Daily  isosorbide mononitrate, 60 mg, Oral, Daily  leflunomide, 20 mg, Oral, Daily  levothyroxine, 75 mcg, Oral, Q AM  losartan, 50 mg, Oral, Daily  methenamine, 1 g, Oral, BID  multivitamin with minerals, 1 tablet, Oral, Daily  pantoprazole, 40 mg, Oral, Daily  sodium chloride, 10 mL, Intravenous, Q12H  spironolactone, 25 mg, Oral, Daily      Current PRN Medications:    acetaminophen    senna-docusate sodium **AND** polyethylene glycol **AND** bisacodyl **AND** bisacodyl    Calcium Replacement - Follow Nurse / BPA Driven Protocol    Diclofenac Sodium    Lidocaine    Magnesium Low Dose Replacement - Follow Nurse / BPA Driven Protocol    [DISCONTINUED] Morphine **AND** naloxone    nitroglycerin    ondansetron    Phosphorus Replacement - Follow Nurse / BPA Driven Protocol    Potassium Replacement - Follow Nurse / BPA Driven Protocol    sodium  "chloride    sodium chloride    sodium chloride    traMADol              Objective     Vital Signs:  Temp (24hrs), Av.1 °F (36.7 °C), Min:97.9 °F (36.6 °C), Max:98.3 °F (36.8 °C)      /50 (BP Location: Right arm, Patient Position: Lying)   Pulse 73   Temp 97.9 °F (36.6 °C) (Oral)   Resp 18   Ht 167.6 cm (66\")   Wt 94.8 kg (209 lb)   LMP  (LMP Unknown)   SpO2 93%   BMI 33.73 kg/m²         Physical Exam:    General: The patient is sitting up at bedside in no acute distress.  Her caregiver gave her a bite of eggs then later the patient fed herself some eggs  Respiratory: Effort even and unlabored  Neuro: She was alert, able to feed herself, not appropriately, did tell me that her daughter was there yesterday and her name was Mackenzie.    Results Review:    I reviewed the patient's new clinical results.    Lab Results:    CBC:   Lab Results   Lab 24  0354 24  0428 24  0428 24  0437 24  0503 24  0338   WBC 6.32 6.22 6.21 5.03 4.74 4.18 4.39   HEMOGLOBIN 7.5* 7.8* 8.1* 7.7* 7.8* 7.4* 7.2*   HEMATOCRIT 25.2* 25.4* 27.2* 26.0* 26.1* 24.6* 23.8*   PLATELETS 192 200 185 170 175 151 150        AutoDiff:            Manual Diff:          Invalid input(s): \"MONABS\"          CMP:   Lab Results   Lab 24  0453 24  0428 24  0437 24  0503 24  0338   SODIUM 138   < > 138 142 140   POTASSIUM 4.2   < > 3.8 3.9 3.5   CHLORIDE 103   < > 105 106 105   CO2 28.0   < > 25.0 26.0 25.0   BUN 16   < > 12 14 14   CREATININE 0.93   < > 0.80 0.98 1.07*   CALCIUM 9.1   < > 8.6 8.5* 8.5*   BILIRUBIN 0.4  --   --   --   --    ALK PHOS 81  --   --   --   --    ALT (SGPT) 18  --   --   --   --    AST (SGOT) 19  --   --   --   --    GLUCOSE 99   < > 98 88 88    < > = values in this interval not displayed.       Estimated Creatinine Clearance: 56.8 mL/min (A) (by C-G formula based on SCr of 1.07 mg/dL (H)).    Culture Results:    Microbiology Results (last 10 " days)       Procedure Component Value - Date/Time    Urine Culture - Urine, Urine, Catheter [[id number]]  (Normal) Collected: 06/21/24 1116    Lab Status: Final result Specimen: Urine, Catheter Updated: 06/22/24 1152     Urine Culture No growth    Urine Culture - Urine, Urine, Clean Catch [[id number]]  (Abnormal)  (Susceptibility) Collected: 06/16/24 1219    Lab Status: Final result Specimen: Urine, Clean Catch Updated: 06/19/24 0936     Urine Culture 25,000 CFU/mL Enterobacter cloacae complex CRE     Comment:   Consider infectious disease consult.  Susceptibility results may not correlate to clinical outcomes.  Carbapenem resistant Enterobacteriaceae, patient may be an isolation risk.  No carbapenemase detected.       Narrative:      Colonization of the urinary tract without infection is common. Treatment is discouraged unless the patient is symptomatic, pregnant, or undergoing an invasive urologic procedure.    Susceptibility        Enterobacter cloacae complex CRE      LICHA      Cefepime Susceptible      Ceftazidime Resistant      Ceftriaxone Resistant      Gentamicin Susceptible      Imipenem Intermediate      Levofloxacin Resistant      Meropenem Resistant      Nitrofurantoin Resistant      Piperacillin + Tazobactam Resistant      Trimethoprim + Sulfamethoxazole Resistant                                        Radiology:         Imaging Results (Last 72 Hours)       Procedure Component Value Units Date/Time    CT Head Without Contrast [[id number]] Collected: 06/25/24 0934     Updated: 06/25/24 0939    Narrative:      EXAM: CT HEAD WO CONTRAST-      DATE: 6/25/2024 8:19 AM     HISTORY: AMS; N39.0-Urinary tract infection, site not specified;  R41.82-Altered mental status, unspecified; S31.819S-Unspecified open  wound of right buttock, sequela; L72.9-Follicular cyst of the skin and  subcutaneous tissue, unspecified; R13.10-Dysphagia, unspecified;  Z74.09-Other reduced mobility; N13.2-Hydronephrosis with renal  and  ureteral calculous obstruction; K81.0-Acute cholecystitis       COMPARISON: 6/12/2024.     DOSE LENGTH PRODUCT: 775.35 mGy.cm  Automated exposure control was also  utilized to decrease patient radiation dose.     TECHNIQUE: Unenhanced CT images obtained from vertex to skull base with  multiplanar reformats.     FINDINGS:  There is no acute intracranial hemorrhage, midline shift, mass effect,  or hydrocephalus. There is no CT evidence for acute infarct.     There are chronic changes with volume loss and chronic small vessel  ischemic change of the periventricular white matter.      SOFT TISSUES: The scalp soft tissues are unremarkable.        SINUS: There is mucosal thickening of the ethmoid air cells and sphenoid  sinus with complete opacification. There is also partial opacification  of the left mastoid air cells.     ORBITS: The visualized orbits and globes are unremarkable. There is  bilateral lens extraction.          Impression:      1. Chronic changes and no acute intracranial findings.   2. Paranasal sinus mucosal thickening and partial opacification of the  left mastoid air cells.     This report was signed and finalized on 6/25/2024 9:36 AM by Zander Motta.                   Active Hospital Problems    Diagnosis     **Altered mental status     Esophageal dysphagia     UTI (urinary tract infection)     Hydronephrosis with urinary obstruction due to ureteral calculus     Acute cholecystitis     Hypothyroidism     Stage 3b chronic kidney disease     Near functional paraplegia     Cholelithiasis     Essential hypertension     Sick sinus syndrome     Coronary artery disease involving native coronary artery of native heart without angina pectoris     Venous insufficiency     Rheumatoid arthritis involving multiple sites     Chronic heart failure with preserved ejection fraction     Class 1 obesity due to excess calories with serious comorbidity and body mass index (BMI) of 33.0 to 33.9 in adult      Functional neurological symptom disorder with weakness or paralysis     Chronic anticoagulation     Chronic embolism and thrombosis of unspecified deep veins of left lower extremity        IMPRESSION:  Cholelithiasis with nonvisualization of gallbladder on HIDA-surgery canceled June 25.  Obstructing left ureteral stone status post cystoscopy and left ureteral stent placement per Dr. Plascencia June 21.  Possible stent removal soon.  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  Susceptible to cefepime.    Right gluteal wound  Intermittent verbal response, however per LANDON Clark improved per her report and patient was speaking in complete sentences this morning but not answering direct questions consistently.      RECOMMENDATION:       Continue contact precautions/isolation's  Continue to monitor closely off antibiotic therapy  Specifically monitor mental status off cefepime  Continue local wound care to right gluteal wound          Maggie Bang MD  06/26/24  08:23 CDT

## 2024-06-26 NOTE — PLAN OF CARE
Problem: Adult Inpatient Plan of Care  Goal: Plan of Care Review  Outcome: Ongoing, Progressing  Flowsheets (Taken 6/26/2024 6872)  Progress: no change  Plan of Care Reviewed With: patient  Outcome Evaluation: Pt alert, disoriented to situation and time. Word finding issues at times. Turned q 2 hours. Dressing to R hip changed this AM. Some nausea this shift, PRN med given with good relief. Horan catheter to BSD. Safety maintained, will continue to monitor.

## 2024-06-26 NOTE — PLAN OF CARE
Goal Outcome Evaluation:  Plan of Care Reviewed With: patient        Progress: improving  Outcome Evaluation: PT tx completed. Pt alert, following commands. Agreeable for therapy. Denies any pn. Bed mobility ModA, sit<>stand Mod A x 2. Stood x 4 at bedside, unable to take any steps due to weakness. Recommend cont PT/OT for strengthening.

## 2024-06-26 NOTE — PROGRESS NOTES
[facility]   Progress Note    Patient Name: Tawny Shin  : 1955  MRN: [id number]  Primary Care Physician:  Moises Oliveira MD  Date of admission: 2024    Subjective   Subjective   Denies abdominal pain. Patient is tolerating diet. States that it is her pills that cause n/v, otherwise food goes down well        Objective     Vitals:   Temp:  [97.1 °F (36.2 °C)-98.3 °F (36.8 °C)] 97.1 °F (36.2 °C)  Heart Rate:  [73-82] 77  Resp:  [16-18] 16  BP: (109-152)/(43-59) 152/44  Physical Exam    Constitutional: Awake, alert   Gastrointestinal: soft, NT, ND   Skin: No rashes     Result Review    Result Review:  I have personally reviewed the results from the time of this admission to 2024 12:53 CDT and agree with these findings:  [x]  Laboratory list / accordion  []  Microbiology  [x]  Radiology  []  EKG/Telemetry   []  Cardiology/Vascular   []  Pathology  []  Old records  []  Other:    Assessment & Plan   Assessment / Plan     Brief Patient Summary:  Tawny Shin is a 68 y.o. female who presented with acute cholecystitis    Active Hospital Problems:  Active Hospital Problems    Diagnosis     **Altered mental status     Esophageal dysphagia     UTI (urinary tract infection)     Hydronephrosis with urinary obstruction due to ureteral calculus     Acute cholecystitis     Hypothyroidism     Stage 3b chronic kidney disease     Near functional paraplegia     Cholelithiasis     Essential hypertension     Sick sinus syndrome     Coronary artery disease involving native coronary artery of native heart without angina pectoris     Venous insufficiency     Rheumatoid arthritis involving multiple sites     Chronic heart failure with preserved ejection fraction     Class 1 obesity due to excess calories with serious comorbidity and body mass index (BMI) of 33.0 to 33.9 in adult     Functional neurological symptom disorder with weakness or paralysis     Chronic anticoagulation     Chronic embolism and  thrombosis of unspecified deep veins of left lower extremity      Plan:   Patient is doing well and has responded to non-operative antibiotic management of cholecystitis.   Recommend outpatient f/u for evaluation for elective cholecystectomy given gallstones and wall thickening on US.  Continue antibiotics for one week  No acute surgical intervention    VTE Prophylaxis:  No VTE prophylaxis order currently exists.        CODE STATUS:   Medical Intervention Limits: Other  Code Status (Patient has no pulse and is not breathing): No CPR (Do Not Attempt to Resuscitate)  Medical Interventions (Patient has pulse or is breathing): Limited Support  Additional Medical Interventions Limits: CPR        MD Saurabh Argueta MD  General Surgery  06/26/24  11:49 CDT

## 2024-06-26 NOTE — THERAPY TREATMENT NOTE
Acute Care - Occupational Therapy Treatment Note  [facility]     Patient Name: Tawny Shin  : 1955  MRN: [id number]  Today's Date: 2024     Date of Referral to OT: 24       Admit Date: 2024       ICD-10-CM ICD-9-CM   1. Acute UTI (urinary tract infection)  N39.0 599.0   2. Altered mental status, unspecified altered mental status type  R41.82 780.97   3. Open wound of right buttock, sequela  S31.819S 906.0   4. Cyst of buttocks  L72.9 706.2   5. Dysphagia, unspecified type  R13.10 787.20   6. Impaired mobility [Z74.09]  Z74.09 799.89   7. Hydronephrosis with urinary obstruction due to ureteral calculus  N13.2 592.1     591   8. Acute cholecystitis  K81.0 575.0     Patient Active Problem List   Diagnosis    T12 compression fracture, initial encounter    Chronic embolism and thrombosis of unspecified deep veins of left lower extremity    Chronic anticoagulation    Iron deficiency anemia    Osteoporosis    E coli bacteremia    Epidural hematoma    Pleural effusion, left    Functional neurological symptom disorder with weakness or paralysis    Class 1 obesity due to excess calories with serious comorbidity and body mass index (BMI) of 33.0 to 33.9 in adult    Rheumatoid arthritis involving multiple sites    Chronic heart failure with preserved ejection fraction    Venous insufficiency    Coronary artery disease involving native coronary artery of native heart without angina pectoris    Sick sinus syndrome    Essential hypertension    Pressure injury of skin of heel    Chronic pain    Anemia of chronic disease    Cholelithiasis    Pressure injury of skin of buttock    Near functional paraplegia    Skin cancer    Squamous cell carcinoma of back    Hematoma    Right-sided chest wall pain    Hyperlipidemia LDL goal <70    Malodorous urine    Stage 3b chronic kidney disease    Cyst of buttocks    Sepsis due to Escherichia coli without acute organ dysfunction    Generalized weakness    Paralysis     History of urinary retention    Bacteremia due to Enterobacter species    Bacteremia    Hypothyroidism    Abnormal CT of the abdomen    Presence of cardiac pacemaker    Altered mental status    UTI (urinary tract infection)    Esophageal dysphagia    Hydronephrosis with urinary obstruction due to ureteral calculus    Acute cholecystitis     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    Elevated cholesterol     Eustachian tube dysfunction     Heart disease     Herpes simplex     History of transfusion     Hyperlipidemia     Hypertension     Hypothyroidism 12/23/2020    Intrinsic asthma 12/23/2020    Knee dislocation     Labral tear of right hip joint     Laryngitis sicca     Laryngitis, chronic     Left carotid bruit 03/09/2016    MI (myocardial infarction)     Myalgia due to statin 06/25/2019    Open wound of right hip 09/14/2021    Osteomyelitis of right femur 07/06/2021    Otorrhea     Pacemaker 11/17/2016    Primary osteoarthritis of left knee 12/23/2020    Psoriasis vulgaris 12/23/2020    S/P coronary artery stent placement 03/09/2016    Sensorineural hearing loss     Seropositive rheumatoid arthritis of multiple sites 12/27/2019    Overview:  -myochrysine '93-'96 -methotrexate '96--->11/98;r/s  restarted 2/99--> 8/14 (anemia) -sulfasalazine- not effective -penicillamine 6/98-->10/98; no effect -leflunomide 11/98--> - Humira '13-->didn't take - Enbrel 12/14-->3/15- no effect!   Last Assessment &  "Plan:  - \"aching all over\" because she had to be off her anti-rheumatic drugs for 2 weeks in preparation for her R knee surgery - he    Sick sinus syndrome 12/27/2019    Sjogren's disease     Spondylolisthesis of lumbar region 01/17/2018    Syncope, recurrent 02/08/2021    Urinary tract infection     UTI (urinary tract infection) 04/19/2024     Past Surgical History:   Procedure Laterality Date    A-V CARDIAC PACEMAKER INSERTION  2016    ATRIAL CARDIAC PACEMAKER INSERTION      CARDIAC CATHETERIZATION      CATARACT EXTRACTION      CERVICAL CORPECTOMY N/A 3/3/2021    Procedure: CERVICAL 6 CORPECTOMY WITH TITANIUM CAGE WITH NEURO MONITORING;  Surgeon: Bandar Shea MD;  Location:  [facility] OR;  Service: Neurosurgery;  Laterality: N/A;    COLONOSCOPY  11/08/2011    One fold in the ascending colon which showed ulcer otherwise normal exam    COLONOSCOPY  11/12/2004    Normal exam repeat in five years    CORONARY ANGIOPLASTY WITH STENT PLACEMENT      X 2; 2013 & 2014    ENDOSCOPY  07/10/2014    Normal exam    ENDOSCOPY N/A 5/30/2024    Procedure: ESOPHAGOGASTRODUODENOSCOPY WITH ANESTHESIA;  Surgeon: Taiwo Sanchez MD;  Location:  [facility];  Service: Gastroenterology;  Laterality: N/A;  preop; dysphagia  postop: balloon dilation  PCP Jesus Manuel jeff    FLAP LEG Right 9/14/2021    Procedure: RIGHT GLUTEAL FASCIOCUTANEOUS ADVANCEMENT FLAP AND RIGHT TENSOR FASCIAL JESSICA FLAP;  Surgeon: Amadeo Turner MD;  Location: [facility] OR;  Service: Plastics;  Laterality: Right;    HIP ABDUCTION TENOTOMY BILATERAL Right 1/14/2021    Procedure: RIGHT HIP GLUTEUS MEDLUS / MINIMUS REPAIR, POSSIBLE ACHILLES ALLOGRAFT;  Surgeon: Nino Carlson MD;  Location:  [facility] OR;  Service: Orthopedics;  Laterality: Right;    INCISION AND DRAINAGE ABSCESS Right 6/4/2022    Procedure: INCISION AND DRAINAGE ABSCESS right hip;  Surgeon: Magda Salcido MD;  Location:  [facility] OR;  Service: General;  Laterality: Right;    INCISION AND DRAINAGE " ABSCESS Right 6/10/2022    Procedure: RIGHT HIP INCISION AND DRAINAGE. MD NEEDS 3L VANC IRRIGATION, CURRETTES, DAICANS, KERLEX ROLLS;  Surgeon: Amadeo Turner MD;  Location:  [facility] OR;  Service: Plastics;  Laterality: Right;    INCISION AND DRAINAGE HIP Right 2/9/2021    Procedure: HIP INCISION AND DRAINAGE;  Surgeon: Nino Carlson MD;  Location:  [facility] OR;  Service: Orthopedics;  Laterality: Right;    INCISION AND DRAINAGE LEG Right 10/24/2021    Procedure: INCISION AND DRAINAGE LOWER EXTREMITY;  Surgeon: Amadeo Turner MD;  Location:  [facility] OR;  Service: Plastics;  Laterality: Right;    INCISION AND DRAINAGE OF WOUND Right 7/8/2021    Procedure: INCISION AND DRAINAGE WOUND RIGHT HIP;  Surgeon: James Huntley MD;  Location:  [facility] OR;  Service: Orthopedics;  Laterality: Right;    JOINT REPLACEMENT      KYPHOPLASTY WITH BIOPSY Bilateral 10/26/2021    Procedure: THOARCIC 12 KYPHOPLASTY WITH BIOPSY;  Surgeon: Bandar Shea MD;  Location:  [facility] OR;  Service: Neurosurgery;  Laterality: Bilateral;    LEG DEBRIDEMENT Right 9/14/2021    Procedure: DEBRIDEMENT OF RIGHT HIP WOUND, RIGHT GLUTEAL FASCIOCUTANEOUS ADVANCEMENT FLAP AND RIGHT TENSOR FASCIAL JESSICA FLAP;  Surgeon: Amadeo Turner MD;  Location:  [facility] OR;  Service: Plastics;  Laterality: Right;    LUMBAR DISCECTOMY Right 3/23/2021    Procedure: LUMBAR DISCECTOMY MICRO, Lumbar 1/2 right;  Surgeon: Bandar Shea MD;  Location:  [facility] OR;  Service: Neurosurgery;  Laterality: Right;    LUMBAR FUSION N/A 1/19/2018    Procedure: L3-4,L4-5 DECOMPRESSION, POSTERIOR SPINAL FUSION WITH INSTRUMENTATION;  Surgeon: Fortino Oropeza MD;  Location:  [facility] OR;  Service:     LUMBAR LAMINECTOMY WITH FUSION Left 1/17/2018    Procedure: LEFT L3-4 L4-5 LATERAL LUMBAR INTERBODY FUSION;  Surgeon: Fortino Oropeza MD;  Location:  [facility] OR;  Service:     MASS EXCISION Right 4/23/2024    Procedure: RIGHT BUTTOCK MASS EXCISION;  Surgeon: Moises Keyes  MD CARMEN;  Location:  [facility] OR;  Service: General;  Laterality: Right;    MYRINGOTOMY W/ TUBES  09/04/2014    TUBES NO LONGER IN PLACE    OTHER SURGICAL HISTORY      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 so hardware was removed and spacers were placed    REPLACEMENT TOTAL KNEE Right     URETEROSCOPY LASER LITHOTRIPSY WITH STENT INSERTION N/A 6/21/2024    Procedure: URETEROSCOPY LASER LITHOTRIPSY WITH STENT INSERTION LEFT;  Surgeon: Ky Plascencia MD;  Location:  [facility] OR;  Service: Urology;  Laterality: N/A;         OT ASSESSMENT FLOWSHEET (Last 12 Hours)       OT Evaluation and Treatment       Row Name 06/26/24 0800                   OT Time and Intention    Subjective Information complains of;fatigue  -TS        Document Type therapy note (daily note)  -TS        Mode of Treatment occupational therapy  -TS        Patient Effort adequate  -TS        Comment occasional word finding difficulty noted  -TS           General Information    Existing Precautions/Restrictions fall  -TS           Pain Assessment    Pretreatment Pain Rating 0/10 - no pain  -TS        Posttreatment Pain Rating 0/10 - no pain  -TS           Cognition    Orientation Status (Cognition) oriented to;person  -TS        Follows Commands (Cognition) follows one-step commands;50-74% accuracy  -TS        Personal Safety Interventions fall prevention program maintained;nonskid shoes/slippers when out of bed  -TS           Activities of Daily Living    BADL Assessment/Intervention lower body dressing;feeding;grooming  -TS           Lower Body Dressing Assessment/Training    [place] Level (Lower Body Dressing) don;socks;maximum assist (25% patient effort)  -TS        Position (Lower Body Dressing) supine  -TS           Grooming Assessment/Training    [place] Level (Grooming) grooming skills;set up;standby assist;supervision  -TS        Position (Grooming) edge of bed sitting  -TS           Self-Feeding Assessment/Training    [place] Level (Feeding)  feeding skills;liquids to mouth;set up;supervision  -TS        Position (Self-Feeding) edge of bed sitting  -TS           Bed Mobility    Scooting/Bridging [place] (Bed Mobility) maximum assist (25% patient effort);2 person assist  -TS        Supine-Sit [place] (Bed Mobility) minimum assist (75% patient effort)  -TS        Sit-Supine [place] (Bed Mobility) moderate assist (50% patient effort)  -TS        Assistive Device (Bed Mobility) bed rails;draw sheet;head of bed elevated  -TS           Wound 04/23/24 0936 Right gluteal Incision    Wound - Properties Group Placement Date: 04/23/24  -EP Placement Time: 0936  -EP Present on Original Admission: N  -EP Side: Right  -EP Location: gluteal  -EP Primary Wound Type: Incision  -EP    Retired Wound - Properties Group Placement Date: 04/23/24  -EP Placement Time: 0936  -EP Present on Original Admission: N  -EP Side: Right  -EP Location: gluteal  -EP Primary Wound Type: Incision  -EP    Retired Wound - Properties Group Date first assessed: 04/23/24  -EP Time first assessed: 0936  -EP Present on Original Admission: N  -EP Side: Right  -EP Location: gluteal  -EP Primary Wound Type: Incision  -EP       Plan of Care Review    Plan of Care Reviewed With patient  -TS        Progress improving  -TS        Outcome Evaluation Pt oriented to self, unable to provide verbal answer when asked if she was at [facility] or [facility]. Pt followed greater than 70% of commands through tx, did require increased time to verbalize responses occasionaly during tx. Pt completed grooming task and self feeding task EOB with set up and SBA. Pt would benefit from continued rehab at discharge. Continue OT POC  -TS           Positioning and Restraints    Pre-Treatment Position in bed  -TS        Post Treatment Position bed  -TS        In Bed fowlers;call light within reach;encouraged to call for assist;with family/caregiver;side rails up x3  -TS                  User Key  (r) = Recorded By,  (t) = Taken By, (c) = Cosigned By      Initials Name Effective Dates    DEANDRE Carolee Giordano NISREEN, GALLITO 02/03/23 -     Sonny Thompson RN 01/22/24 -                      Occupational Therapy Education       Title: PT OT SLP Therapies (Done)       Topic: Occupational Therapy (Done)       Point: ADL training (Done)       Description:   Instruct learner(s) on proper safety adaptation and remediation techniques during self care or transfers.   Instruct in proper use of assistive devices.                  Learning Progress Summary             Patient Acceptance, E, VU by KS at 6/20/2024 1715    Acceptance, E, VU by EC at 6/20/2024 1558    Acceptance, E, VU by EC at 6/17/2024 1525   Caregiver Acceptance, E, VU by EC at 6/20/2024 1558                         Point: Home exercise program (Done)       Description:   Instruct learner(s) on appropriate technique for monitoring, assisting and/or progressing therapeutic exercises/activities.                  Learning Progress Summary             Patient Acceptance, E, VU by KS at 6/20/2024 1715    Acceptance, E, VU by EC at 6/20/2024 1558   Caregiver Acceptance, E, VU by EC at 6/20/2024 1558                         Point: Precautions (Done)       Description:   Instruct learner(s) on prescribed precautions during self-care and functional transfers.                  Learning Progress Summary             Patient Acceptance, E, VU by KS at 6/20/2024 1715    Acceptance, E, VU,NR by LS at 6/19/2024 1136    Acceptance, E, VU by EC at 6/17/2024 1525                         Point: Body mechanics (Done)       Description:   Instruct learner(s) on proper positioning and spine alignment during self-care, functional mobility activities and/or exercises.                  Learning Progress Summary             Patient Acceptance, E, VU by KS at 6/20/2024 1715    Acceptance, E, VU by EC at 6/20/2024 1558    Acceptance, E, VU,NR by LS at 6/19/2024 1136    Acceptance, E, VU by EC at 6/17/2024 1525    Caregiver Acceptance, E, VU by  at 6/20/2024 1558                                         User Key       Initials Effective Dates Name Provider Type Discipline    KS 02/27/23 -  Ewelina Morales, RN Registered Nurse Nurse     06/20/22 -  Bianca Mcgarry, OTR/L Occupational Therapist OT    EC 10/13/23 -  Ashley Martino, OTR/L Occupational Therapist OT                      OT Recommendation and Plan     Plan of Care Review  Plan of Care Reviewed With: patient  Progress: improving  Outcome Evaluation: Pt oriented to self, unable to provide verbal answer when asked if she was at [facility] or [facility]. Pt followed greater than 70% of commands through tx, did require increased time to verbalize responses occasionaly during tx. Pt completed grooming task and self feeding task EOB with set up and SBA. Pt would benefit from continued rehab at discharge. Continue OT POC  Plan of Care Reviewed With: patient  Outcome Evaluation: Pt oriented to self, unable to provide verbal answer when asked if she was at [facility] or [facility]. Pt followed greater than 70% of commands through tx, did require increased time to verbalize responses occasionaly during tx. Pt completed grooming task and self feeding task EOB with set up and SBA. Pt would benefit from continued rehab at discharge. Continue OT POC     Outcome Measures       Row Name 06/26/24 1300 06/25/24 1400 06/24/24 1400       How much help from another person do you currently need...    Turning from your back to your side while in flat bed without using bedrails? -- 2  -KJ 1  -KJ    Moving from lying on back to sitting on the side of a flat bed without bedrails? -- 2  -KJ 1  -KJ    Moving to and from a bed to a chair (including a wheelchair)? -- 2  -KJ 1  -KJ    Standing up from a chair using your arms (e.g., wheelchair, bedside chair)? -- 2  -KJ 1  -KJ    Climbing 3-5 steps with a railing? -- 1  -KJ 1  -KJ    To walk in hospital room? -- 1  -KJ 1  -KJ    AM-PAC 6 Clicks Score  (PT) -- 10  -KJ 6  -KJ    Highest Level of Mobility Goal -- 4 --> Transfer to chair/commode  -KJ 2 --> Bed activities/dependent transfer  -KJ       How much help from another is currently needed...    Putting on and taking off regular lower body clothing? 2  -TS -- --    Bathing (including washing, rinsing, and drying) 2  -TS -- --    Toileting (which includes using toilet bed pan or urinal) 2  -TS -- --    Putting on and taking off regular upper body clothing 2  -TS -- --    Taking care of personal grooming (such as brushing teeth) 3  -TS -- --    Eating meals 3  -TS -- --    AM-PAC 6 Clicks Score (OT) 14  -TS -- --       Functional Assessment    Outcome Measure Options -- AM-PAC 6 Clicks Basic Mobility (PT)  -KJ AM-PAC 6 Clicks Basic Mobility (PT)  -KJ              User Key  (r) = Recorded By, (t) = Taken By, (c) = Cosigned By      Initials Name Provider Type    KJ Emilee Paul, SERENA Physical Therapist Assistant    TS Carolee Giordano COTA Occupational Therapist Assistant                    Time Calculation:    Time Calculation- OT       Row Name 06/26/24 1309             Time Calculation- OT    OT Start Time 0800  -TS      OT Stop Time 0900  -TS      OT Time Calculation (min) 60 min  -TS      Total Timed Code Minutes- OT 60 minute(s)  -TS      OT Received On 06/26/24  -TS         Timed Charges    73662 - OT Self Care/Mgmt Minutes 60  -TS         Total Minutes    Timed Charges Total Minutes 60  -TS       Total Minutes 60  -TS                User Key  (r) = Recorded By, (t) = Taken By, (c) = Cosigned By      Initials Name Provider Type    TS Craolee Giordano COTA Occupational Therapist Assistant                  Therapy Charges for Today       Code Description Service Date Service Provider Modifiers Qty    [id number] HC OT SELF CARE/MGMT/TRAIN EA 15 MIN 6/26/2024 Carolee Giordano COTA GO 4                 Carolee NIELSON. GALLITO Giordano  6/26/2024

## 2024-06-26 NOTE — PLAN OF CARE
Goal Outcome Evaluation:  Plan of Care Reviewed With: patient        Progress: improving  Outcome Evaluation: Pt oriented to self, unable to provide verbal answer when asked if she was at [facility] or [facility]. Pt followed greater than 70% of commands through tx, did require increased time to verbalize responses occasionaly during tx. Pt completed grooming task and self feeding task EOB with set up and SBA. Pt would benefit from continued rehab at discharge. Continue OT POC                                Radiation Oncology Follow-Up Visit    Date of Visit: August 15, 2017    Referring Physician: Brian Trejo MD    Diagnosis: Seminoma of descended left testis (CMS/HCC)    Staging form: Testis, AJCC 7th Edition      Clinical stage from 6/5/2017: Stage IA (pT1, N0, M0, S0) - Signed by Flo Marsh MD on 6/5/2017    Treatment: Left inguinal orchiectomy completed 5/10/17. He is currently on surveillance.    Interval History:  The patient presents for routine follow-up now 3 months after his orchiectomy. He has continued to do very well with no new problems or concerns. He states that he feels normal again and denies any pelvic or abdominal pain. He checks his right testicle regularly and has noted no lumps.    Current Medications:  Current Outpatient Prescriptions   Medication Sig Dispense Refill   • cetirizine (ZYRTEC) 10 MG tablet Take 10 mg by mouth daily as needed for Allergies.     • fluticasone (FLOVENT HFA) 110 MCG/ACT inhaler Inhale 1 puff into the lungs 2 times daily. Use with spacer as instructed 1 Inhaler 5   • Peak Flow Meter Device Use as instructed 1 Device 0   • Spacer/Aero Chamber Mouthpiece Misc Use as instructed. 1 each 0   • EPIPEN 2-LORE 0.3 MG/0.3ML auto-injector Inject 0.3 mLs into the muscle as needed (USE ONLY AS INSTRUCTED). 1 each 3   • IBUPROFEN PO Take 200 mg by mouth as needed.      • Multiple Vitamin (ONE-A-DAY MENS PO) Take by mouth daily.      • albuterol 108 (90 BASE) MCG/ACT inhaler Inhale 2 puffs into the lungs every 4 hours as needed for Shortness of Breath or Wheezing. 1 Inhaler 5     No current facility-administered medications for this encounter.      Facility-Administered Medications Ordered in Other Encounters   Medication Dose Route Frequency Provider Last Rate Last Dose   • iohexol (OMNIPAQUE 350) contrast solution 25 mL  25 mL Oral Once Flo Marsh MD           Allergies:  ALLERGIES:   Allergen Reactions   • Nuts Other (See Comments)     Swollen tongue and throat with  mixed nuts. Skin test was positive to hazelnut.    • Cat Dander Other (See Comments)     Asthma, allergic rhinitis and allergic conjunctivitis.   • Dog Dander Other (See Comments)     Asthma, allergic rhinitis and allergic conjunctivitis.   • Dust Mite Extract Other (See Comments)     Asthma, allergic rhinitis and allergic conjunctivitis.   • Pollen Other (See Comments)     Tree pollen and grass pollens.   Asthma, allergic rhinitis and allergic conjunctivitis.        Physical Exam:  Visit Vitals  /90 (BP Location: [facility] [place], Patient Position: Sitting)   Pulse 72   Temp 97.7 °F (36.5 °C) (Temporal Artery)   Resp 12   Wt 82.2 kg   SpO2 98%   BMI 26.76 kg/m²     Constitutional:  Well developed, well nourished. No acute distress, non-toxic appearance. ECOG 0  HENT:  Normocephalic, atraumatic. Bilateral external ears normal. Oropharynx moist. No oral exudates. Nose normal.  Lymphatic:  There is no cervical, supraclavicular, axillary, or inguinal adenopathy. The left inguinal scar is well-healed and intact without mass.  Respiratory:  No respiratory distress. Normal breath sounds. No rales. No wheezing.  Cardiovascular:  Normal rate, normal rhythm. No murmurs, no gallops, no rubs.  Abdomen:  Bowel sounds normal. Soft. No tenderness. No masses. No pulsatile masses. No rebound or organomegaly.  Right testicle: It is of normal size shape and consistency without mass or firmness or lesion.      Diagnostic Studies:  Ct Abdomen Pelvis    Result Date: 8/14/2017  HISTORY: Follow-up for testicular cancer. Left-sided orchiectomy. EXAM: A CT scan of the abdomen/pelvis was performed with thin cut axial images following the administration of oral contrast and 100 cc of Isovue-300 IV contrast material. Multiplanar reformatted images were reviewed at a dedicated workstation. COMPARISON: CT abdomen/pelvis from May 18, 2017. FINDINGS: The visualized lung bases are free of acute/focal disease. There is no appreciable pulmonary nodule. The  heart size is normal. The liver and gallbladder are unremarkable without ductal dilatation or mass lesion. The spleen, adrenal glands, and pancreas are unremarkable. There is no adrenal mass lesion. Both kidneys enhance symmetrically. There is no enhancing/solid mass lesion, hydronephrosis, or renal calculi. The small bowel and colon have an unremarkable and stable appearance. There is a normal appendix in the right lower quadrant. The aorta is normal caliber. There is no periaortic, abdominal, retroperitoneal lymphadenopathy. There is no free air or free fluid. There is no pelvic sidewall adenopathy, free air, or free fluid. There is mild wall thickening in the urinary bladder. The urinary bladder is not significantly distended. The prostate gland seminal vesicles have a normal appearance. There are no enlarged lymph nodes in the iliac chain or inguinal regions. There are no aggressive or destructive osseous lesions.     IMPRESSION: 1. No acute abnormalities of the abdomen/pelvis. No interval change. 2. No evidence for intra-abdominal or bony metastatic disease.     The recent imaging was personally reviewed.    Impression:  Now 3 months following a left inguinal orchiectomy for a stage I seminoma of the left testicle. The patient is clinically without evidence of disease.    Recommendations:  He will follow-up again in 3 months with another CT of the abdomen and pelvis per NCCN guidelines.    At least 15 minutes were spent on this case with greater than 50% of the time dedicated to education, counseling, reviewing the patient’s medical record and/or coordination of care.    Flo Marsh MD  [facility]

## 2024-06-27 ENCOUNTER — APPOINTMENT (OUTPATIENT)
Dept: ULTRASOUND IMAGING | Facility: HOSPITAL | Age: 69
DRG: 659 | End: 2024-06-27
Payer: MEDICARE

## 2024-06-27 LAB
ANION GAP SERPL CALCULATED.3IONS-SCNC: 12 MMOL/L (ref 5–15)
BUN SERPL-MCNC: 15 MG/DL (ref 8–23)
BUN/CREAT SERPL: 12.7 (ref 7–25)
CALCIUM SPEC-SCNC: 9.1 MG/DL (ref 8.6–10.5)
CHLORIDE SERPL-SCNC: 99 MMOL/L (ref 98–107)
CO2 SERPL-SCNC: 25 MMOL/L (ref 22–29)
CREAT SERPL-MCNC: 1.18 MG/DL (ref 0.57–1)
DEPRECATED RDW RBC AUTO: 62.1 FL (ref 37–54)
EGFRCR SERPLBLD CKD-EPI 2021: 49.8 ML/MIN/1.73
ERYTHROCYTE [DISTWIDTH] IN BLOOD BY AUTOMATED COUNT: 18.5 % (ref 12.3–15.4)
GLUCOSE SERPL-MCNC: 80 MG/DL (ref 65–99)
HCT VFR BLD AUTO: 26.3 % (ref 34–46.6)
HGB BLD-MCNC: 8.1 G/DL (ref 12–15.9)
MCH RBC QN AUTO: 28.5 PG (ref 26.6–33)
MCHC RBC AUTO-ENTMCNC: 30.8 G/DL (ref 31.5–35.7)
MCV RBC AUTO: 92.6 FL (ref 79–97)
PLATELET # BLD AUTO: 160 10*3/MM3 (ref 140–450)
PMV BLD AUTO: 12.2 FL (ref 6–12)
POTASSIUM SERPL-SCNC: 3.5 MMOL/L (ref 3.5–5.2)
RBC # BLD AUTO: 2.84 10*6/MM3 (ref 3.77–5.28)
SODIUM SERPL-SCNC: 136 MMOL/L (ref 136–145)
WBC NRBC COR # BLD AUTO: 4.86 10*3/MM3 (ref 3.4–10.8)

## 2024-06-27 PROCEDURE — 85027 COMPLETE CBC AUTOMATED: CPT | Performed by: UROLOGY

## 2024-06-27 PROCEDURE — 93923 UPR/LXTR ART STDY 3+ LVLS: CPT | Performed by: SURGERY

## 2024-06-27 PROCEDURE — 99232 SBSQ HOSP IP/OBS MODERATE 35: CPT | Performed by: UROLOGY

## 2024-06-27 PROCEDURE — 99232 SBSQ HOSP IP/OBS MODERATE 35: CPT | Performed by: INTERNAL MEDICINE

## 2024-06-27 PROCEDURE — 80048 BASIC METABOLIC PNL TOTAL CA: CPT | Performed by: FAMILY MEDICINE

## 2024-06-27 PROCEDURE — 97110 THERAPEUTIC EXERCISES: CPT

## 2024-06-27 PROCEDURE — 25010000002 ENOXAPARIN PER 10 MG: Performed by: FAMILY MEDICINE

## 2024-06-27 PROCEDURE — 93971 EXTREMITY STUDY: CPT | Performed by: SURGERY

## 2024-06-27 PROCEDURE — 93923 UPR/LXTR ART STDY 3+ LVLS: CPT

## 2024-06-27 PROCEDURE — 97530 THERAPEUTIC ACTIVITIES: CPT

## 2024-06-27 PROCEDURE — 93971 EXTREMITY STUDY: CPT

## 2024-06-27 RX ORDER — ENOXAPARIN SODIUM 100 MG/ML
1 INJECTION SUBCUTANEOUS EVERY 12 HOURS
Status: DISCONTINUED | OUTPATIENT
Start: 2024-06-27 | End: 2024-07-05

## 2024-06-27 RX ADMIN — ACETAMINOPHEN 650 MG: 325 TABLET, FILM COATED ORAL at 20:56

## 2024-06-27 RX ADMIN — ISOSORBIDE MONONITRATE 60 MG: 60 TABLET, EXTENDED RELEASE ORAL at 09:50

## 2024-06-27 RX ADMIN — ASPIRIN 81 MG: 81 TABLET, COATED ORAL at 09:51

## 2024-06-27 RX ADMIN — LEFLUNOMIDE 20 MG: 20 TABLET ORAL at 09:51

## 2024-06-27 RX ADMIN — PANTOPRAZOLE SODIUM 40 MG: 40 TABLET, DELAYED RELEASE ORAL at 09:52

## 2024-06-27 RX ADMIN — METHENAMINE HIPPURATE 1 G: 1000 TABLET ORAL at 09:49

## 2024-06-27 RX ADMIN — METHENAMINE HIPPURATE 1 G: 1000 TABLET ORAL at 20:56

## 2024-06-27 RX ADMIN — DULOXETINE HYDROCHLORIDE 60 MG: 30 CAPSULE, DELAYED RELEASE ORAL at 09:50

## 2024-06-27 RX ADMIN — FUROSEMIDE 40 MG: 40 TABLET ORAL at 09:51

## 2024-06-27 RX ADMIN — TRAMADOL HYDROCHLORIDE 50 MG: 50 TABLET ORAL at 09:50

## 2024-06-27 RX ADMIN — LOSARTAN POTASSIUM 50 MG: 50 TABLET, FILM COATED ORAL at 09:50

## 2024-06-27 RX ADMIN — Medication 10 ML: at 09:52

## 2024-06-27 RX ADMIN — LEVOTHYROXINE SODIUM 75 MCG: 0.07 TABLET ORAL at 05:57

## 2024-06-27 RX ADMIN — OXYCODONE HYDROCHLORIDE AND ACETAMINOPHEN 500 MG: 500 TABLET ORAL at 09:51

## 2024-06-27 RX ADMIN — ESTRADIOL 2 APPLICATOR: 0.1 CREAM VAGINAL at 09:52

## 2024-06-27 RX ADMIN — FOLIC ACID 1000 MCG: 1 TABLET ORAL at 09:51

## 2024-06-27 RX ADMIN — Medication 10 ML: at 20:21

## 2024-06-27 RX ADMIN — CARVEDILOL 25 MG: 25 TABLET, FILM COATED ORAL at 09:51

## 2024-06-27 RX ADMIN — ENOXAPARIN SODIUM 90 MG: 100 INJECTION SUBCUTANEOUS at 17:50

## 2024-06-27 RX ADMIN — LIDOCAINE 1 PATCH: 4 PATCH TOPICAL at 10:33

## 2024-06-27 RX ADMIN — SPIRONOLACTONE 25 MG: 25 TABLET ORAL at 09:52

## 2024-06-27 RX ADMIN — FERROUS SULFATE TAB 325 MG (65 MG ELEMENTAL FE) 325 MG: 325 (65 FE) TAB at 09:51

## 2024-06-27 RX ADMIN — Medication 1 TABLET: at 09:50

## 2024-06-27 NOTE — CASE MANAGEMENT/SOCIAL WORK
Continued Stay Note   [place]     Patient Name: Tawny Shin  MRN: [id number]  Today's Date: 6/27/2024    Admit Date: 6/12/2024    Plan: [facility]   Discharge Plan       Row Name 06/27/24 1341       Plan    Plan Comments Pt still acute and not ready for dc. Will continue to follow and assist with dc plans/needs closer to dc.                   Discharge Codes    No documentation.                 Expected Discharge Date and Time       Expected Discharge Date Expected Discharge Time    Jun 28, 2024               OSIEL Pizano

## 2024-06-27 NOTE — PROGRESS NOTES
"INFECTIOUS DISEASES PROGRESS NOTE    Patient:  Tawny Shin  YOB: 1955  MRN: [id number]   Admit date: 6/12/2024   Admitting Physician: Kris Cade,*  Primary Care Physician: Moises Oliveira MD    Chief Complaint:  right leg pain        Interval History: Patient is just coming back from vascular studies.  She first said that she had told \"10 doctors\" that she had right leg pain but her caregiver mentioned that she did not complain about it until last night and only told the doctor she saw today.  The patient clarified that she had not told anyone before this morning.      Allergies:   Allergies   Allergen Reactions    Atorvastatin Other (See Comments)     LEG CRAMPS      Amoxicillin Rash    Escitalopram Rash    Nabumetone Rash    Niacin Er Rash    Penicillin G Rash    Penicillins Rash    Simvastatin Rash       Current Scheduled Medications:   ascorbic acid, 500 mg, Oral, Daily  aspirin, 81 mg, Oral, Daily  carvedilol, 25 mg, Oral, BID With Meals  DULoxetine, 60 mg, Oral, Daily  enoxaparin, 1 mg/kg, Subcutaneous, Q12H  estradiol, 2 g, Vaginal, Daily  ferrous sulfate, 325 mg, Oral, Daily With Breakfast  folic acid, 1,000 mcg, Oral, Daily  furosemide, 40 mg, Oral, Daily  isosorbide mononitrate, 60 mg, Oral, Daily  leflunomide, 20 mg, Oral, Daily  levothyroxine, 75 mcg, Oral, Q AM  losartan, 50 mg, Oral, Daily  methenamine, 1 g, Oral, BID  multivitamin with minerals, 1 tablet, Oral, Daily  pantoprazole, 40 mg, Oral, Daily  sodium chloride, 10 mL, Intravenous, Q12H  spironolactone, 25 mg, Oral, Daily      Current PRN Medications:    acetaminophen    senna-docusate sodium **AND** polyethylene glycol **AND** bisacodyl **AND** bisacodyl    Calcium Replacement - Follow Nurse / BPA Driven Protocol    Diclofenac Sodium    Lidocaine    Magnesium Low Dose Replacement - Follow Nurse / BPA Driven Protocol    [DISCONTINUED] Morphine **AND** naloxone    nitroglycerin    ondansetron    Phosphorus " "Replacement - Follow Nurse / BPA Driven Protocol    Potassium Replacement - Follow Nurse / BPA Driven Protocol    sodium chloride    sodium chloride    sodium chloride    traMADol              Objective     Vital Signs:  Temp (24hrs), Av.4 °F (36.3 °C), Min:96.3 °F (35.7 °C), Max:98.1 °F (36.7 °C)      /48 (BP Location: Right arm, Patient Position: Lying)   Pulse 85   Temp 97.2 °F (36.2 °C) (Axillary)   Resp 16   Ht 167.6 cm (66\")   Wt 94.8 kg (209 lb)   LMP  (LMP Unknown)   SpO2 92%   BMI 33.73 kg/m²         Physical Exam:    General: The patient is chronically ill-appearing lying on transport gurney in no acute distress.  Caregiver at bedside.  HEENT: Sclera anicteric and noninjected.  Conjunctive are pale  Respiratory: Effort even and unlabored  Right lower extremity patient does have some increased edema.  Tenderness to palpation along the lower one third of leg mainly in the calf area.  Some pale erythema noted as well    Results Review:    I reviewed the patient's new clinical results.    Lab Results:    CBC:   Lab Results   Lab 24  04224  0453 24  0428 24  0437 24  0503 24  0338 24  0556   WBC 6.22 6.21 5.03 4.74 4.18 4.39 4.86   HEMOGLOBIN 7.8* 8.1* 7.7* 7.8* 7.4* 7.2* 8.1*   HEMATOCRIT 25.4* 27.2* 26.0* 26.1* 24.6* 23.8* 26.3*   PLATELETS 200 185 170 175 151 150 160        AutoDiff:            Manual Diff:          Invalid input(s): \"MONABS\"          CMP:   Lab Results   Lab 24  0453 24  0428 24  0503 24  0338 24  0556   SODIUM 138   < > 142 140 136   POTASSIUM 4.2   < > 3.9 3.5 3.5   CHLORIDE 103   < > 106 105 99   CO2 28.0   < > 26.0 25.0 25.0   BUN 16   < > 14 14 15   CREATININE 0.93   < > 0.98 1.07* 1.18*   CALCIUM 9.1   < > 8.5* 8.5* 9.1   BILIRUBIN 0.4  --   --   --   --    ALK PHOS 81  --   --   --   --    ALT (SGPT) 18  --   --   --   --    AST (SGOT) 19  --   --   --   --    GLUCOSE 99   < > 88 88 80    < > " = values in this interval not displayed.       Estimated Creatinine Clearance: 51.5 mL/min (A) (by C-G formula based on SCr of 1.18 mg/dL (H)).    Culture Results:    Microbiology Results (last 10 days)       Procedure Component Value - Date/Time    Urine Culture - Urine, Urine, Catheter [[id number]]  (Normal) Collected: 06/21/24 1116    Lab Status: Final result Specimen: Urine, Catheter Updated: 06/22/24 1152     Urine Culture No growth                 Radiology:         Imaging Results (Last 72 Hours)       Procedure Component Value Units Date/Time    US Ankle / Brachial Indices Extremity Complete [[id number]] Resulted: 06/27/24 1434     Updated: 06/27/24 1454    US Venous Doppler Lower Extremity Right (duplex) [[id number]] Resulted: 06/27/24 1427     Updated: 06/27/24 1454    CT Head Without Contrast [[id number]] Collected: 06/25/24 0934     Updated: 06/25/24 0939    Narrative:      EXAM: CT HEAD WO CONTRAST-      DATE: 6/25/2024 8:19 AM     HISTORY: AMS; N39.0-Urinary tract infection, site not specified;  R41.82-Altered mental status, unspecified; S31.819S-Unspecified open  wound of right buttock, sequela; L72.9-Follicular cyst of the skin and  subcutaneous tissue, unspecified; R13.10-Dysphagia, unspecified;  Z74.09-Other reduced mobility; N13.2-Hydronephrosis with renal and  ureteral calculous obstruction; K81.0-Acute cholecystitis       COMPARISON: 6/12/2024.     DOSE LENGTH PRODUCT: 775.35 mGy.cm  Automated exposure control was also  utilized to decrease patient radiation dose.     TECHNIQUE: Unenhanced CT images obtained from vertex to skull base with  multiplanar reformats.     FINDINGS:  There is no acute intracranial hemorrhage, midline shift, mass effect,  or hydrocephalus. There is no CT evidence for acute infarct.     There are chronic changes with volume loss and chronic small vessel  ischemic change of the periventricular white matter.      SOFT TISSUES: The scalp soft tissues are unremarkable.         SINUS: There is mucosal thickening of the ethmoid air cells and sphenoid  sinus with complete opacification. There is also partial opacification  of the left mastoid air cells.     ORBITS: The visualized orbits and globes are unremarkable. There is  bilateral lens extraction.          Impression:      1. Chronic changes and no acute intracranial findings.   2. Paranasal sinus mucosal thickening and partial opacification of the  left mastoid air cells.     This report was signed and finalized on 6/25/2024 9:36 AM by Zander Motta.                   Active Hospital Problems    Diagnosis     **Altered mental status     Esophageal dysphagia     UTI (urinary tract infection)     Hydronephrosis with urinary obstruction due to ureteral calculus     Acute cholecystitis     Hypothyroidism     Stage 3b chronic kidney disease     Near functional paraplegia     Cholelithiasis     Essential hypertension     Sick sinus syndrome     Coronary artery disease involving native coronary artery of native heart without angina pectoris     Venous insufficiency     Rheumatoid arthritis involving multiple sites     Chronic heart failure with preserved ejection fraction     Class 1 obesity due to excess calories with serious comorbidity and body mass index (BMI) of 33.0 to 33.9 in adult     Functional neurological symptom disorder with weakness or paralysis     Chronic anticoagulation     Chronic embolism and thrombosis of unspecified deep veins of left lower extremity        IMPRESSION:  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  It was susceptible to cefepime and patient completed a treatment course.  Obstructive left ureteral stone status post cystoscopy and left ureteral stent placement on June 21 Dr. Plascencia.  Stented Horan catheter remain in place.  Right lower extremity edema and very pale erythema-vascular studies obtain to evaluate for DVT.  Could be early cellulitic component but primary complaint is that  "of pain.  Cholelithiasis with nonvisualization of gallbladder on HIDA-surgery canceled June 25 due to concerns for mental status changes and word finding difficulty.  Increasing creatinine-neurology holding off on stent removal.  Right gluteal wound  Deconditioning-Per PT note yesterday patient unable to take steps due to weakness and unable to fully stand today.      RECOMMENDATION:     Increasing creatinine management per hospitalist-gentle IV fluids?  Will place order for nursing to document all p.o. intake.  It looks like last 24 hours only documentation is \"supplements\"  Elevate right lower extremity above the level heart  Await vascular study report  No plan for reinitiation of antibiotic therapy at this time but certainly if vascular study is negative and there is some progression of right lower extremity erythema, would consider.  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  Continue local wound care to right gluteal wound          Maggie Bang MD  06/27/24  15:03 CDT      "

## 2024-06-27 NOTE — THERAPY TREATMENT NOTE
Acute Care - Physical Therapy Treatment Note  [facility]     Patient Name: Tawny Shin  : 1955  MRN: [id number]  Today's Date: 2024      Visit Dx:     ICD-10-CM ICD-9-CM   1. Acute UTI (urinary tract infection)  N39.0 599.0   2. Altered mental status, unspecified altered mental status type  R41.82 780.97   3. Open wound of right buttock, sequela  S31.819S 906.0   4. Cyst of buttocks  L72.9 706.2   5. Dysphagia, unspecified type  R13.10 787.20   6. Impaired mobility [Z74.09]  Z74.09 799.89   7. Hydronephrosis with urinary obstruction due to ureteral calculus  N13.2 592.1     591   8. Acute cholecystitis  K81.0 575.0     Patient Active Problem List   Diagnosis    T12 compression fracture, initial encounter    Chronic embolism and thrombosis of unspecified deep veins of left lower extremity    Chronic anticoagulation    Iron deficiency anemia    Osteoporosis    E coli bacteremia    Epidural hematoma    Pleural effusion, left    Functional neurological symptom disorder with weakness or paralysis    Class 1 obesity due to excess calories with serious comorbidity and body mass index (BMI) of 33.0 to 33.9 in adult    Rheumatoid arthritis involving multiple sites    Chronic heart failure with preserved ejection fraction    Venous insufficiency    Coronary artery disease involving native coronary artery of native heart without angina pectoris    Sick sinus syndrome    Essential hypertension    Pressure injury of skin of heel    Chronic pain    Anemia of chronic disease    Cholelithiasis    Pressure injury of skin of buttock    Near functional paraplegia    Skin cancer    Squamous cell carcinoma of back    Hematoma    Right-sided chest wall pain    Hyperlipidemia LDL goal <70    Malodorous urine    Stage 3b chronic kidney disease    Cyst of buttocks    Sepsis due to Escherichia coli without acute organ dysfunction    Generalized weakness    Paralysis    History of urinary retention    Bacteremia due to  "Enterobacter species    Bacteremia    Hypothyroidism    Abnormal CT of the abdomen    Presence of cardiac pacemaker    Altered mental status    UTI (urinary tract infection)    Esophageal dysphagia    Hydronephrosis with urinary obstruction due to ureteral calculus    Acute cholecystitis     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    Elevated cholesterol     Eustachian tube dysfunction     Heart disease     Herpes simplex     History of transfusion     Hyperlipidemia     Hypertension     Hypothyroidism 12/23/2020    Intrinsic asthma 12/23/2020    Knee dislocation     Labral tear of right hip joint     Laryngitis sicca     Laryngitis, chronic     Left carotid bruit 03/09/2016    MI (myocardial infarction)     Myalgia due to statin 06/25/2019    Open wound of right hip 09/14/2021    Osteomyelitis of right femur 07/06/2021    Otorrhea     Pacemaker 11/17/2016    Primary osteoarthritis of left knee 12/23/2020    Psoriasis vulgaris 12/23/2020    S/P coronary artery stent placement 03/09/2016    Sensorineural hearing loss     Seropositive rheumatoid arthritis of multiple sites 12/27/2019    Overview:  -myochrysine '93-'96 -methotrexate '96--->11/98;r/s  restarted 2/99--> 8/14 (anemia) -sulfasalazine- not effective -penicillamine 6/98-->10/98; no effect -leflunomide 11/98--> - Humira '13-->didn't take - Enbrel 12/14-->3/15- no effect!   Last Assessment & Plan:  - \"aching all over\" because she had to be " off her anti-rheumatic drugs for 2 weeks in preparation for her R knee surgery - he    Sick sinus syndrome 12/27/2019    Sjogren's disease     Spondylolisthesis of lumbar region 01/17/2018    Syncope, recurrent 02/08/2021    Urinary tract infection     UTI (urinary tract infection) 04/19/2024     Past Surgical History:   Procedure Laterality Date    A-V CARDIAC PACEMAKER INSERTION  2016    ATRIAL CARDIAC PACEMAKER INSERTION      CARDIAC CATHETERIZATION      CATARACT EXTRACTION      CERVICAL CORPECTOMY N/A 3/3/2021    Procedure: CERVICAL 6 CORPECTOMY WITH TITANIUM CAGE WITH NEURO MONITORING;  Surgeon: Bandar Shea MD;  Location:  [facility];  Service: Neurosurgery;  Laterality: N/A;    COLONOSCOPY  11/08/2011    One fold in the ascending colon which showed ulcer otherwise normal exam    COLONOSCOPY  11/12/2004    Normal exam repeat in five years    CORONARY ANGIOPLASTY WITH STENT PLACEMENT      X 2; 2013 & 2014    ENDOSCOPY  07/10/2014    Normal exam    ENDOSCOPY N/A 5/30/2024    Procedure: ESOPHAGOGASTRODUODENOSCOPY WITH ANESTHESIA;  Surgeon: Taiwo Sanchez MD;  Location: [facility] ENDOSCOPY;  Service: Gastroenterology;  Laterality: N/A;  preop; dysphagia  postop: balloon dilation  PCP Jesus Manuel jeff    FLAP LEG Right 9/14/2021    Procedure: RIGHT GLUTEAL FASCIOCUTANEOUS ADVANCEMENT FLAP AND RIGHT TENSOR FASCIAL JESSICA FLAP;  Surgeon: Amadeo Turner MD;  Location:  [facility];  Service: Plastics;  Laterality: Right;    HIP ABDUCTION TENOTOMY BILATERAL Right 1/14/2021    Procedure: RIGHT HIP GLUTEUS MEDLUS / MINIMUS REPAIR, POSSIBLE ACHILLES ALLOGRAFT;  Surgeon: Nino Carlson MD;  Location:  [facility];  Service: Orthopedics;  Laterality: Right;    INCISION AND DRAINAGE ABSCESS Right 6/4/2022    Procedure: INCISION AND DRAINAGE ABSCESS right hip;  Surgeon: Magda Salcido MD;  Location:  [facility];  Service: General;  Laterality: Right;    INCISION AND DRAINAGE ABSCESS Right 6/10/2022    Procedure: RIGHT HIP  INCISION AND DRAINAGE. MD NEEDS 3L VANC IRRIGATION, CURRETTES, DAICANS, KERLEX ROLLS;  Surgeon: Amadeo Turner MD;  Location:  [facility];  Service: Plastics;  Laterality: Right;    INCISION AND DRAINAGE HIP Right 2/9/2021    Procedure: HIP INCISION AND DRAINAGE;  Surgeon: Nino Carlson MD;  Location:  [facility];  Service: Orthopedics;  Laterality: Right;    INCISION AND DRAINAGE LEG Right 10/24/2021    Procedure: INCISION AND DRAINAGE LOWER EXTREMITY;  Surgeon: Amadeo Turner MD;  Location:  [facility];  Service: Plastics;  Laterality: Right;    INCISION AND DRAINAGE OF WOUND Right 7/8/2021    Procedure: INCISION AND DRAINAGE WOUND RIGHT HIP;  Surgeon: James Huntley MD;  Location:  [facility];  Service: Orthopedics;  Laterality: Right;    JOINT REPLACEMENT      KYPHOPLASTY WITH BIOPSY Bilateral 10/26/2021    Procedure: THOARCIC 12 KYPHOPLASTY WITH BIOPSY;  Surgeon: Bandar Shea MD;  Location:  [facility];  Service: Neurosurgery;  Laterality: Bilateral;    LEG DEBRIDEMENT Right 9/14/2021    Procedure: DEBRIDEMENT OF RIGHT HIP WOUND, RIGHT GLUTEAL FASCIOCUTANEOUS ADVANCEMENT FLAP AND RIGHT TENSOR FASCIAL JESSICA FLAP;  Surgeon: Amadeo Turner MD;  Location:  [facility];  Service: Plastics;  Laterality: Right;    LUMBAR DISCECTOMY Right 3/23/2021    Procedure: LUMBAR DISCECTOMY MICRO, Lumbar 1/2 right;  Surgeon: Bandar Shea MD;  Location:  [facility];  Service: Neurosurgery;  Laterality: Right;    LUMBAR FUSION N/A 1/19/2018    Procedure: L3-4,L4-5 DECOMPRESSION, POSTERIOR SPINAL FUSION WITH INSTRUMENTATION;  Surgeon: Fortino Oropeza MD;  Location:  [facility];  Service:     LUMBAR LAMINECTOMY WITH FUSION Left 1/17/2018    Procedure: LEFT L3-4 L4-5 LATERAL LUMBAR INTERBODY FUSION;  Surgeon: Fortino Oropeza MD;  Location:  [facility];  Service:     MASS EXCISION Right 4/23/2024    Procedure: RIGHT BUTTOCK MASS EXCISION;  Surgeon: Moises Keyes MD;  Location:  [facility];  Service: General;   Laterality: Right;    MYRINGOTOMY W/ TUBES  09/04/2014    TUBES NO LONGER IN PLACE    OTHER SURGICAL HISTORY      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 so hardware was removed and spacers were placed    REPLACEMENT TOTAL KNEE Right     URETEROSCOPY LASER LITHOTRIPSY WITH STENT INSERTION N/A 6/21/2024    Procedure: URETEROSCOPY LASER LITHOTRIPSY WITH STENT INSERTION LEFT;  Surgeon: Ky Plascencia MD;  Location: [facility];  Service: Urology;  Laterality: N/A;     PT Assessment (Last 12 Hours)       PT Evaluation and Treatment       Row Name 06/27/24 1350          Physical Therapy Time and Intention    Subjective Information complains of;pain  -KJ     Document Type therapy note (daily note)  -KJ     Mode of Treatment physical therapy  -KJ     Patient Effort good  -KJ     Comment increased pn RLE  -KJ       Row Name 06/27/24 1350          General Information    Existing Precautions/Restrictions fall  -KJ       Row Name 06/27/24 1350          Pain    Pretreatment Pain Rating 5/10  -KJ     Posttreatment Pain Rating 5/10  -KJ     Pain Location - Side/Orientation Right  -KJ     Pain Location lower  -KJ     Pain Location - extremity  -KJ       Row Name 06/27/24 1350          Bed Mobility    Rolling Left [place] (Bed Mobility) moderate assist (50% patient effort)  -KJ     Rolling Right [place] (Bed Mobility) moderate assist (50% patient effort)  -KJ     Supine-Sit [place] (Bed Mobility) moderate assist (50% patient effort);verbal cues  -KJ     Sit-Supine [place] (Bed Mobility) moderate assist (50% patient effort);verbal cues  -KJ       Row Name 06/27/24 1350          Sit-Stand Transfer    Sit-Stand [place] (Transfers) verbal cues;moderate assist (50% patient effort);2 person assist;maximum assist (25% patient effort)  -KJ     Assistive Device (Sit-Stand Transfers) walker, front-wheeled  -KJ     Comment, (Sit-Stand Transfer) elevated bed; pt was unable to get into ful stand today  -KJ       Row Name  06/27/24 1350          Stand-Sit Transfer    Stand-Sit [place] (Transfers) verbal cues;2 person assist;moderate assist (50% patient effort)  -KJ     Assistive Device (Stand-Sit Transfers) walker, front-wheeled  -KJ       Row Name 06/27/24 1350          Balance    Balance Assessment sitting static balance  -KJ     Static Sitting Balance independent  -KJ     Dynamic Sitting Balance minimal assist  -KJ     Position, Sitting Balance sitting edge of bed  -KJ       Row Name 06/27/24 1350          Motor Skills    Therapeutic Exercise aerobic  -KJ     Comments, Therapeutic Exercise LLE A/AAROM; AP's BLE.  -KJ       Row Name             Wound 04/23/24 0936 Right gluteal Incision    Wound - Properties Group Placement Date: 04/23/24  -EP Placement Time: 0936  -EP Present on Original Admission: N  -EP Side: Right  -EP Location: gluteal  -EP Primary Wound Type: Incision  -EP    Retired Wound - Properties Group Placement Date: 04/23/24  -EP Placement Time: 0936  -EP Present on Original Admission: N  -EP Side: Right  -EP Location: gluteal  -EP Primary Wound Type: Incision  -EP    Retired Wound - Properties Group Date first assessed: 04/23/24  -EP Time first assessed: 0936  -EP Present on Original Admission: N  -EP Side: Right  -EP Location: gluteal  -EP Primary Wound Type: Incision  -EP      Row Name 06/27/24 1350          Positioning and Restraints    Pre-Treatment Position in bed  -KJ     Post Treatment Position bed  -KJ     In Bed call light within reach  -KJ               User Key  (r) = Recorded By, (t) = Taken By, (c) = Cosigned By      Initials Name Provider Type    Emilee Gloria PTA Physical Therapist Assistant    Sonny Thompson, RN Registered Nurse                    Physical Therapy Education       Title: PT OT SLP Therapies (Done)       Topic: Physical Therapy (Done)       Point: Mobility training (Done)       Learning Progress Summary             Patient Acceptance, E, VU by KS at 6/20/2024 4918     Acceptance, E, VU by MS at 6/18/2024 1502    Comment: role of PT in her care                         Point: Home exercise program (Done)       Learning Progress Summary             Patient Acceptance, E, VU by KS at 6/20/2024 1715    Acceptance, E,D,TB, VU,DU,NR by  at 6/15/2024 1514    Acceptance, E,TB,D, VU,DU,NR by  at 6/14/2024 1550                         Point: Body mechanics (Done)       Learning Progress Summary             Patient Acceptance, E, VU by KS at 6/20/2024 1715    Acceptance, E,D,TB, VU,DU,NR by  at 6/15/2024 1514    Acceptance, E,TB,D, VU,DU,NR by  at 6/14/2024 1550                         Point: Precautions (Done)       Learning Progress Summary             Patient Acceptance, E, VU by KS at 6/20/2024 1715    Acceptance, E,D,TB, VU,DU,NR by  at 6/15/2024 1514    Acceptance, E,TB,D, VU,DU,NR by  at 6/14/2024 1550                                         User Key       Initials Effective Dates Name Provider Type Discipline    MS 07/11/23 -  Nicole Olson, PT, DPT, NCS Physical Therapist PT    KS 02/27/23 -  Ewelina Morales, RN Registered Nurse Nurse     10/17/23 -  Stephanie Doll RN Registered Nurse Nurse                  PT Recommendation and Plan     Plan of Care Reviewed With: patient  Progress: improving  Outcome Evaluation: PT tx completed. Pt alert, following commands. Agreeable for therapy. Denies any pn. Bed mobility ModA, sit<>stand Mod A x 2. Stood x 4 at bedside, unable to take any steps due to weakness. Recommend cont PT/OT for strengthening.   Outcome Measures       Row Name 06/27/24 1400 06/26/24 1400 06/26/24 1300       How much help from another person do you currently need...    Turning from your back to your side while in flat bed without using bedrails? 2  -KJ 2  -KJ --    Moving from lying on back to sitting on the side of a flat bed without bedrails? 2  -KJ 2  -KJ --    Moving to and from a bed to a chair (including a wheelchair)? 1  -KJ 2  -KJ --     Standing up from a chair using your arms (e.g., wheelchair, bedside chair)? 1  -KJ 1  -KJ --    Climbing 3-5 steps with a railing? 1  -KJ 1  -KJ --    To walk in hospital room? 1  -KJ 1  -KJ --    AM-PAC 6 Clicks Score (PT) 8  -KJ 9  -KJ --    Highest Level of Mobility Goal 3 --> Sit at edge of bed  -KJ 3 --> Sit at edge of bed  -KJ --       How much help from another is currently needed...    Putting on and taking off regular lower body clothing? -- -- 2  -TS    Bathing (including washing, rinsing, and drying) -- -- 2  -TS    Toileting (which includes using toilet bed pan or urinal) -- -- 2  -TS    Putting on and taking off regular upper body clothing -- -- 2  -TS    Taking care of personal grooming (such as brushing teeth) -- -- 3  -TS    Eating meals -- -- 3  -TS    AM-PAC 6 Clicks Score (OT) -- -- 14  -TS       Functional Assessment    Outcome Measure Options AM-PAC 6 Clicks Basic Mobility (PT)  -KJ -[facility] 6 Clicks Basic Mobility (PT)  -KJ --      Row Name 06/25/24 1400             How much help from another person do you currently need...    Turning from your back to your side while in flat bed without using bedrails? 2  -KJ      Moving from lying on back to sitting on the side of a flat bed without bedrails? 2  -KJ      Moving to and from a bed to a chair (including a wheelchair)? 2  -KJ      Standing up from a chair using your arms (e.g., wheelchair, bedside chair)? 2  -KJ      Climbing 3-5 steps with a railing? 1  -KJ      To walk in hospital room? 1  -KJ      AM-PAC 6 Clicks Score (PT) 10  -KJ      Highest Level of Mobility Goal 4 --> Transfer to chair/commode  -KJ         Functional Assessment    Outcome Measure Options AM-PAC 6 Clicks Basic Mobility (PT)  -KJ                User Key  (r) = Recorded By, (t) = Taken By, (c) = Cosigned By      Initials Name Provider Type    Emilee Gloria, SERENA Physical Therapist Assistant    Carolee Roger COTA Occupational Therapist Assistant                      Time Calculation:    PT Charges       Row Name 06/27/24 1425             Time Calculation    Start Time 1350  -KJ      Stop Time 1428  -KJ      Time Calculation (min) 38 min  -KJ      PT Received On 06/27/24  -KJ      PT Goal Re-Cert Due Date 06/28/24  -KJ         Time Calculation- PT    Total Timed Code Minutes- PT 38 minute(s)  -KJ                User Key  (r) = Recorded By, (t) = Taken By, (c) = Cosigned By      Initials Name Provider Type    Emilee Gloria, SERENA Physical Therapist Assistant                  Therapy Charges for Today       Code Description Service Date Service Provider Modifiers Qty    48446651072 HC PT THER PROC EA 15 MIN 6/26/2024 Emilee Paul, PTA GP 1    82300624888 HC PT THERAPEUTIC ACT EA 15 MIN 6/26/2024 Emilee Paul, PTA GP 2    28784946817 HC PT THER PROC EA 15 MIN 6/27/2024 Emilee Paul, PTA GP 1    30597688081 HC PT THERAPEUTIC ACT EA 15 MIN 6/27/2024 Emilee Paul, PTA GP 2            PT G-Codes  Outcome Measure Options: AM-PAC 6 Clicks Basic Mobility (PT)  AM-PAC 6 Clicks Score (PT): 8  AM-PAC 6 Clicks Score (OT): 14    Emilee Paul PTA  6/27/2024

## 2024-06-27 NOTE — PLAN OF CARE
Goal Outcome Evaluation: Pt is alert and oriented, sitter and family are at bedside, vitals are stable, no s/s of distress, room air, would care was completed early this morning, no c/o nausea, c/o pain to right leg and reported to MD, kay is draining per gravity, awaiting results of dopper study           Progress: improving

## 2024-06-27 NOTE — PLAN OF CARE
Problem: Adult Inpatient Plan of Care  Goal: Plan of Care Review  Outcome: Ongoing, Progressing  Flowsheets (Taken 6/27/2024 4517)  Progress: no change  Plan of Care Reviewed With: patient  Outcome Evaluation: Pt A&Ox4, VSS. Word finding issues seem improved compared to previous shift. Turned q 2 hours as pt tolerates. New preventative foam dressings applied to bilateral heels and wound care and dressing change to R hip incision completed. Horan in place to BSD. Room air, CPOX. No nausea this shift. Safety maintained, will continue to monitor.

## 2024-06-27 NOTE — PROGRESS NOTES
[facility] Medicine Services  INPATIENT PROGRESS NOTE    Patient Name: Tawny Shin  Date of Admission: 6/12/2024  Today's Date: 06/27/24  Length of Stay: 6  Primary Care Physician: Moises Oliveira MD    Subjective   Chief Complaint: Recurrent UTI, gallstone, kidney stone   HPI   Complains of right lower extremity pain from the hip to the foot. The leg is edematous.  No skin discoloration.  Wound on the right buttock appears clean and dry.  Eliquis remains on hold for possible cholecystectomy.    Review of Systems    Negative for chills and fever.   HENT:  Negative for hearing loss, nosebleeds, tinnitus and trouble swallowing.    Eyes:  Negative for visual disturbance.   Respiratory:  Negative for cough, chest tightness, shortness of breath and wheezing.    Cardiovascular:  Negative for chest pain, palpitations and leg swelling.   Gastrointestinal:  Negative for abdominal distention, abdominal pain, blood in stool, constipation, diarrhea, nausea and vomiting.   Endocrine: Negative for cold intolerance, heat intolerance, polydipsia, polyphagia and polyuria.   Genitourinary:  Negative for decreased urine volume, difficulty urinating, dysuria, flank pain, frequency and hematuria.   Musculoskeletal:  Positive for arthralgias, gait problem and myalgias. Negative for joint swelling.   Skin:  Negative for rash.   Allergic/Immunologic: Negative for immunocompromised state.   Neurological:  Positive for weakness. Negative for dizziness, syncope, light-headedness and headaches.   Hematological:  Negative for adenopathy. Does not bruise/bleed easily.   All pertinent negatives and positives are as above. All other systems have been reviewed and are negative unless otherwise stated.     Objective    Temp:  [96.3 °F (35.7 °C)-98.1 °F (36.7 °C)] 97.2 °F (36.2 °C)  Heart Rate:  [74-85] 85  Resp:  [16] 16  BP: (120-156)/(48-67) 148/48  Physical Exam  Vitals and nursing note reviewed.    Constitutional:       Comments: Chronically ill.  Deconditioning.   HENT:      Head: Normocephalic.   Eyes:      Conjunctiva/sclera: Conjunctivae normal.      Pupils: Pupils are equal, round, and reactive to light.   Cardiovascular:      Rate and Rhythm: Normal rate and regular rhythm.      Heart sounds: Normal heart sounds.   Pulmonary:      Effort: Pulmonary effort is normal. No respiratory distress.      Breath sounds: Normal breath sounds.   Abdominal:      General: Bowel sounds are normal. There is no distension.      Palpations: Abdomen is soft.      Comments: Obesity.  No tenderness noted with palpation.   Musculoskeletal:         General: No swelling.      Cervical back: Neck supple.   Skin:     General: Skin is warm and dry.      Capillary Refill: Capillary refill takes 2 to 3 seconds.      Findings: No rash.   Neurological:      Mental Status: She is alert and oriented to person, place, and time.      Motor: Weakness present.      Coordination: Coordination abnormal.      Gait: Gait abnormal.   Psychiatric:         Mood and Affect: Mood normal.         Behavior: Behavior normal.         Thought Content: Thought content normal.          Results Review:  I have reviewed the labs, radiology results, and diagnostic studies.    Laboratory Data:   Results from last 7 days   Lab Units 06/27/24  0556 06/26/24  0338 06/25/24  0503   WBC 10*3/mm3 4.86 4.39 4.18   HEMOGLOBIN g/dL 8.1* 7.2* 7.4*   HEMATOCRIT % 26.3* 23.8* 24.6*   PLATELETS 10*3/mm3 160 150 151        Results from last 7 days   Lab Units 06/27/24  0556 06/26/24  0338 06/25/24  0503 06/23/24  0428 06/22/24  0453   SODIUM mmol/L 136 140 142   < > 138   POTASSIUM mmol/L 3.5 3.5 3.9   < > 4.2   CHLORIDE mmol/L 99 105 106   < > 103   CO2 mmol/L 25.0 25.0 26.0   < > 28.0   BUN mg/dL 15 14 14   < > 16   CREATININE mg/dL 1.18* 1.07* 0.98   < > 0.93   CALCIUM mg/dL 9.1 8.5* 8.5*   < > 9.1   BILIRUBIN mg/dL  --   --   --   --  0.4   ALK PHOS U/L  --   --   --    --  81   ALT (SGPT) U/L  --   --   --   --  18   AST (SGOT) U/L  --   --   --   --  19   GLUCOSE mg/dL 80 88 88   < > 99    < > = values in this interval not displayed.       Culture Data:   Urine Culture   Date Value Ref Range Status   06/21/2024 No growth  Final   06/16/2024 25,000 CFU/mL Enterobacter cloacae complex CRE (A)  Final     Comment:       Consider infectious disease consult.  Susceptibility results may not correlate to clinical outcomes.  Carbapenem resistant Enterobacteriaceae, patient may be an isolation risk.  No carbapenemase detected.       Radiology Data:   Imaging Results (Last 24 Hours)       ** No results found for the last 24 hours. **            I have reviewed the patient's current medications.     Assessment/Plan   Assessment  Active Hospital Problems    Diagnosis     **Altered mental status     Esophageal dysphagia     UTI (urinary tract infection)     Hydronephrosis with urinary obstruction due to ureteral calculus     Acute cholecystitis     Hypothyroidism     Stage 3b chronic kidney disease     Near functional paraplegia     Cholelithiasis     Essential hypertension     Sick sinus syndrome     Coronary artery disease involving native coronary artery of native heart without angina pectoris     Venous insufficiency     Rheumatoid arthritis involving multiple sites     Chronic heart failure with preserved ejection fraction     Class 1 obesity due to excess calories with serious comorbidity and body mass index (BMI) of 33.0 to 33.9 in adult     Functional neurological symptom disorder with weakness or paralysis     Chronic anticoagulation     Chronic embolism and thrombosis of unspecified deep veins of left lower extremity        Treatment Plan  Recurrent UTI/neurogenic bladder.  Cefepime/Flagyl antibiotics.  Continue HipRex.  Repeated UA, negative for bacteria.  Consult ID.  Urology consulted and following.   CT scan abdomen pelvic . Obstructive uropathy on the left with mild to moderate  dilatation of the upper tracts of the left kidney and left ureter into the true pelvis where there is a 5 x 6 mm calculus within the left ureter approximately 4 to 5 cm above the UVJ- right ureter is decompressed and normal in appearance- No evidence of renal mass. No perinephric fluid collection present, gallbladder is mildly distended- gallstones within the gallbladder neck,  No pericholecystic inflammatory changes or biliary dilatation, Constipation,  No obstruction or free air, Normal appendix, Calcified fibroid within the lower uterine segment, Mild constipation, No obstruction or free air, Normal appendix, Small fat-containing periumbilical hernia, Previous kyphoplasty at T12, Previous fusion at L3-L4 and L4-L5, Left basilar atelectasis..  Recommendation from urology-evaluation of neurogenic bladder at a university setting like [place].     Obstructing renal calculi left side/moderate dilated of left ureter.  Callus 5 x 6 mm and left 4 to 5 cm above the UVJ junction.  Status post 6/21/2024 urethroscope laser lithotripsy with stent insertion left. Horan in place.     Gallstone, within the gallbladder neck/dysfunctional gallbladder.  Mild distended gallbladder.  Right upper quadrant tenderness.  Ultrasound of the gallbladder-Cholelithiasis with small shadowing stones and sludge within the region of the gallbladder neck- Moderate distention of the gallbladder- however no gallbladder wall thickening or pericholecystic fluid identified- No biliary dilatation.  HIDA scan-Nonvisualization of the gallbladder on images obtained up to 90 minutes- Cystic duct obstruction and acute cholecystitis cannot be excluded on the basis of this exam,  No evidence of common bile duct obstruction with emptying of activity into the C-loop of the duodenum and proximal jejunum, Gallstones and sludge were demonstrated on recent ultrasound- combined with nonvisualization of the gallbladder exclude performance of an ejection  fraction..  Discussed with general surgery Dr. Jo-plan for gallbladder removal this coming Tuesday, DC Eliquis today 6/22/2024 start Lovenox today, nursing communication to stop Lovenox 24 hours before surgery on Monday.  Dr. Jo also recommended cardiac clearance for surgery. Cardiology states okay to DC Lovenox today.    Cholecystectomy delay due to waxing and waning encephalopathy and concern for further cognitive decline.  General surgery considering referral for interventional radiologist to percutaneous drain.     Reflux/esophageal dysphagia. Dysphagia recent esophageal stretching, GI evaluated and recommended to continue outpatient follow-up.  SLP evaluated and recommended to continue regular diet.  Protonix . Zofran as needed.     Altered mental status/encephalopathy.  Consult neurology  She does not seem to receive anything overnight that could cause worsening mental status changes.  Metronidazole has an incidence of encephalopathy but average is a 28 days and worsening patients with liver disease which makes shortness seem to have at this point.  CT scan the head-No acute intracranial abnormality, Chronic sinusitis and left mastoid effusion.  Repeat this morning.  Medications reviewed  Chest x-ray-Stable chest exam without acute process, No visualized infiltrate.   Patient is on room air.    Right lower extremity pain  Lovenox full dose.  Continue to hold Eliquis  Doppler right lower extremity and NISREEN.  Pain control.     Hypothyroidism.  Synthroid.     Chronic stage IIIb renal failure.  Creatinine normalized.     Hypomagnesia. Resolved.     Hyponatremia.  Resolved.     Hypokalemia.  Resolved.  Magnesium normal.     Hypertension/sick sinus syndrome/venous insufficiency/CHF.  Eliquis.  Aspirin . Coreg . Lasix.  Imdur.  Cozaar . Aldactone.  Nitro as needed.     Right gluteal wound.  Consult wound care.     Rheumatoid arthritis. Arava.     Depression/anxiety.  Cymbalta.     Postmenopausal.  Estradiol  cream.     Anemia.  Hemoglobin decreased.  Iron sulfate.  Folic acid.  No sign of acute bleed.     Pain . lidocaine patch.  Voltaren gel.  Ultram as needed.     Chronic DVT.  DC Eliquis due to possible surgery, on Lovenox.    Functional neurological symptom disorder with paraplegia.     Nutrition.  Cardiac diet.  Vitamin C.  Multivitamins.  Regular/house diet.  Boost supplement.     Deconditioning.  PT and OT consult.  Patient use a walker and bedbound at baseline.     Urine culture-100,000 CFU/mL Enterobacter cloacae complex  25,000 CFU/mL Enterobacter cloacae complex CRE   Blood culture-no growth for 5 days.     Dr. Shin's mom.  Patient already have home health at home.  Patient request for lift at discharge.     Medical Decision Making  Number and Complexity of problems: Recurrent UTI/altered mental status/reflux/rheumatoid arthritis/chronic 3B renal failure  Differential Diagnosis: None     Conditions and Status        Condition is unchanged.     MDM Data  External documents reviewed: Previous note .  Cardiac tracing (EKG, telemetry) interpretation: Sinus.  Radiology interpretation: X-ray/CT scan of the head  Labs reviewed: Laboratory  Any tests that were considered but not ordered: Lab in a.m.     Decision rules/scores evaluated (example [id number], Wells, etc): None     Discussed with: Patient and family     Care Planning  Shared decision making: Patient and family  Code status and discussions: DNR     Disposition  Social Determinants of Health that impact treatment or disposition: From home  1 to 3 days.    Electronically signed by Kris Cade MD, 06/27/24, 13:49 CDT.

## 2024-06-27 NOTE — PROGRESS NOTES
LOS: 6 days   Patient Care Team:  Moises Oliveira MD as PCP - General (Internal Medicine)  Manda, Moises Matson MD as Consulting Physician (Otolaryngology)  Christiano Rao PA as Physician Assistant (Otolaryngology)  Candelaria David MD as Obstetrician (Obstetrics and Gynecology)  Omar Hernandez MD as Cardiologist (Cardiology)  Leta Crawford APRN as Nurse Practitioner (Nurse Practitioner)  Leta Crawford APRN as Nurse Practitioner (Nurse Practitioner)    Chief Complaint:  UTI, stone, PINKY    Subjective     Interval History:     Reports leg pain.    Review of Systems  Pertinent items are noted in HPI     Objective     Vital Signs  Temp:  [96.3 °F (35.7 °C)-98.1 °F (36.7 °C)] 96.3 °F (35.7 °C)  Heart Rate:  [74-82] 82  Resp:  [16] 16  BP: (120-156)/(44-67) 156/66    Physical Exam:  Right leg is tender and slightly warm to the touch.      Data Review:       I have reviewed the following data:    CBC (No Diff) (06/27/2024 05:56)    Basic Metabolic Panel (06/27/2024 05:56)    CT Abdomen Pelvis Without Contrast (06/20/2024 12:52)    Medication Review:     Current Facility-Administered Medications:     acetaminophen (TYLENOL) tablet 650 mg, 650 mg, Oral, Q6H PRN, Ky Plascencia MD, 650 mg at 06/19/24 1403    ascorbic acid (VITAMIN C) tablet 500 mg, 500 mg, Oral, Daily, Ky Plascencia MD, 500 mg at 06/26/24 0852    aspirin EC tablet 81 mg, 81 mg, Oral, Daily, Ky Plascencia MD, 81 mg at 06/26/24 0849    sennosides-docusate (PERICOLACE) 8.6-50 MG per tablet 2 tablet, 2 tablet, Oral, BID PRN **AND** polyethylene glycol (MIRALAX) packet 17 g, 17 g, Oral, Daily PRN, 17 g at 06/15/24 1553 **AND** bisacodyl (DULCOLAX) EC tablet 5 mg, 5 mg, Oral, Daily PRN **AND** bisacodyl (DULCOLAX) suppository 10 mg, 10 mg, Rectal, Daily PRN, Ky Plascencia MD    Calcium Replacement - Follow Nurse / BPA Driven Protocol, , Does not apply, PRNTemo Donald L, MD    carvedilol (COREG) tablet 25 mg, 25 mg, Oral, BID  With Meals, Ky Plascencia MD, 25 mg at 06/26/24 1715    Diclofenac Sodium (VOLTAREN) 1 % gel 4 g, 4 g, Topical, 4x Daily PRN, Ky Plascencia MD    DULoxetine (CYMBALTA) DR capsule 60 mg, 60 mg, Oral, Daily, Ky Plascencia MD, 60 mg at 06/26/24 0853    estradiol (ESTRACE) vaginal cream 2 applicator, 2 g, Vaginal, Daily, Ky Plascencia MD, 2 applicator at 06/26/24 0855    ferrous sulfate tablet 325 mg, 325 mg, Oral, Daily With Breakfast, Ky Plascencia MD, 325 mg at 06/26/24 0854    folic acid (FOLVITE) tablet 1,000 mcg, 1,000 mcg, Oral, Daily, Ky Plascencia MD, 1,000 mcg at 06/26/24 0852    furosemide (LASIX) tablet 40 mg, 40 mg, Oral, Daily, Ky Plascencia MD, 40 mg at 06/26/24 0853    isosorbide mononitrate (IMDUR) 24 hr tablet 60 mg, 60 mg, Oral, Daily, Ky Plascencia MD, 60 mg at 06/26/24 0850    leflunomide (ARAVA) tablet 20 mg, 20 mg, Oral, Daily, Ky Plascencia MD, 20 mg at 06/26/24 0854    levothyroxine (SYNTHROID, LEVOTHROID) tablet 75 mcg, 75 mcg, Oral, Q AM, Ky Plascencia MD, 75 mcg at 06/27/24 0557    Lidocaine 4 % 1 patch, 1 patch, Transdermal, Daily PRN, Ky Plascencia MD    losartan (COZAAR) tablet 50 mg, 50 mg, Oral, Daily, Ky Plascencia MD, 50 mg at 06/26/24 0852    Magnesium Low Dose Replacement - Follow Nurse / BPA Driven Protocol, , Does not apply, PRN, Ky Plascencia MD    methenamine (HIPREX) tablet 1 g, 1 g, Oral, BID, Ky Plascencia MD, 1 g at 06/26/24 1950    multivitamin with minerals 1 tablet, 1 tablet, Oral, Daily, Ky Plascencia MD, 1 tablet at 06/26/24 0854    [DISCONTINUED] Morphine sulfate (PF) injection 1 mg, 1 mg, Intravenous, Q4H PRN **AND** naloxone (NARCAN) injection 0.4 mg, 0.4 mg, Intravenous, Q5 Min PRN, Ky Plascencia MD    nitroglycerin (NITROSTAT) SL tablet 0.4 mg, 0.4 mg, Sublingual, Q5 Min PRN, Ky Plascencia MD    ondansetron (ZOFRAN) injection 4 mg, 4 mg, Intravenous, Q6H PRN, Ky Plascencia MD, 4 mg at 06/26/24  0906    pantoprazole (PROTONIX) EC tablet 40 mg, 40 mg, Oral, Daily, Ky Plascencia MD, 40 mg at 06/26/24 0854    Phosphorus Replacement - Follow Nurse / BPA Driven Protocol, , Does not apply, Temo PALACIO Donald L, MD    Potassium Replacement - Follow Nurse / BPA Driven Protocol, , Does not apply, Temo PALACIO Donald L, MD    sodium chloride 0.9 % flush 10 mL, 10 mL, Intravenous, PRNTemo Donald L, MD, 10 mL at 06/12/24 2200    sodium chloride 0.9 % flush 10 mL, 10 mL, Intravenous, Q12H, Ky Plascencia MD, 10 mL at 06/26/24 0855    sodium chloride 0.9 % flush 10 mL, 10 mL, Intravenous, PRNTemo Donald L, MD    sodium chloride 0.9 % infusion 40 mL, 40 mL, Intravenous, PRNTemo Donald L, MD, 40 mL at 06/25/24 1144    spironolactone (ALDACTONE) tablet 25 mg, 25 mg, Oral, Daily, Ky Plascencia MD, 25 mg at 06/26/24 0854    traMADol (ULTRAM) tablet 50 mg, 50 mg, Oral, Q12H PRN, Ky Plascencia MD, 50 mg at 06/26/24 1950    Assessment and Plan:    Left ureter stone: POD#6. Stent will need to come out but would defer    PINKY: Continue Horan and stent for now. Unlikely to be obstructive. Recommend Nephrology consult if appropriate.    Recurrent UTI: ID following. Defer current management to them. Recurrences could be from stone - will need to re-evaluate now that stone has been addressed.     Right leg pain: Possibly cellulitis. Defer to primary team. I've recommended the patient and caregiver bring it to the hospitalist's team  attention.     URO DISPO: I will assess the patient again tomorrow.     I discussed the patient's findings and my recommendations with patient and family    (Please note that portions of this note were completed with a voice recognition program.)    Albaro Dixon MD  06/27/24  09:19 CDT    Time: Time spent: 20 minutes spent performing evaluation and management, chart review, and discussion with patient, > 50% of time spent in face-to-face encounter

## 2024-06-28 LAB
ANION GAP SERPL CALCULATED.3IONS-SCNC: 13 MMOL/L (ref 5–15)
BUN SERPL-MCNC: 14 MG/DL (ref 8–23)
BUN/CREAT SERPL: 11.5 (ref 7–25)
CALCIUM SPEC-SCNC: 8.9 MG/DL (ref 8.6–10.5)
CHLORIDE SERPL-SCNC: 99 MMOL/L (ref 98–107)
CO2 SERPL-SCNC: 25 MMOL/L (ref 22–29)
CREAT SERPL-MCNC: 1.22 MG/DL (ref 0.57–1)
EGFRCR SERPLBLD CKD-EPI 2021: 47.8 ML/MIN/1.73
GLUCOSE SERPL-MCNC: 84 MG/DL (ref 65–99)
MAGNESIUM SERPL-MCNC: 1.3 MG/DL (ref 1.6–2.4)
POTASSIUM SERPL-SCNC: 3 MMOL/L (ref 3.5–5.2)
SODIUM SERPL-SCNC: 137 MMOL/L (ref 136–145)

## 2024-06-28 PROCEDURE — 25010000002 ENOXAPARIN PER 10 MG: Performed by: FAMILY MEDICINE

## 2024-06-28 PROCEDURE — 97530 THERAPEUTIC ACTIVITIES: CPT

## 2024-06-28 PROCEDURE — 97110 THERAPEUTIC EXERCISES: CPT

## 2024-06-28 PROCEDURE — 83735 ASSAY OF MAGNESIUM: CPT | Performed by: INTERNAL MEDICINE

## 2024-06-28 PROCEDURE — 99232 SBSQ HOSP IP/OBS MODERATE 35: CPT | Performed by: UROLOGY

## 2024-06-28 PROCEDURE — 25010000002 MAGNESIUM SULFATE IN D5W 1G/100ML (PREMIX) 1-5 GM/100ML-% SOLUTION: Performed by: FAMILY MEDICINE

## 2024-06-28 PROCEDURE — 80048 BASIC METABOLIC PNL TOTAL CA: CPT | Performed by: NURSE PRACTITIONER

## 2024-06-28 PROCEDURE — 25010000002 ONDANSETRON PER 1 MG: Performed by: UROLOGY

## 2024-06-28 PROCEDURE — 99232 SBSQ HOSP IP/OBS MODERATE 35: CPT | Performed by: INTERNAL MEDICINE

## 2024-06-28 PROCEDURE — 97168 OT RE-EVAL EST PLAN CARE: CPT

## 2024-06-28 PROCEDURE — 97535 SELF CARE MNGMENT TRAINING: CPT

## 2024-06-28 PROCEDURE — 25010000002 SODIUM CHLORIDE 0.9 % WITH KCL 40 MEQ/L 40-0.9 MEQ/L-% SOLUTION: Performed by: INTERNAL MEDICINE

## 2024-06-28 RX ORDER — TRAMADOL HYDROCHLORIDE 50 MG/1
50 TABLET ORAL EVERY 6 HOURS PRN
Status: DISCONTINUED | OUTPATIENT
Start: 2024-06-28 | End: 2024-07-08 | Stop reason: HOSPADM

## 2024-06-28 RX ORDER — ESTRADIOL 0.1 MG/G
2 CREAM VAGINAL 2 TIMES WEEKLY
Status: DISCONTINUED | OUTPATIENT
Start: 2024-07-01 | End: 2024-07-08 | Stop reason: HOSPADM

## 2024-06-28 RX ORDER — POTASSIUM CHLORIDE 750 MG/1
20 CAPSULE, EXTENDED RELEASE ORAL ONCE
Status: COMPLETED | OUTPATIENT
Start: 2024-06-28 | End: 2024-06-28

## 2024-06-28 RX ORDER — SODIUM CHLORIDE AND POTASSIUM CHLORIDE 300; 900 MG/100ML; MG/100ML
100 INJECTION, SOLUTION INTRAVENOUS CONTINUOUS
Status: DISPENSED | OUTPATIENT
Start: 2024-06-28 | End: 2024-06-29

## 2024-06-28 RX ORDER — MAGNESIUM SULFATE 1 G/100ML
1 INJECTION INTRAVENOUS
Qty: 300 ML | Refills: 0 | Status: COMPLETED | OUTPATIENT
Start: 2024-06-28 | End: 2024-06-28

## 2024-06-28 RX ADMIN — SPIRONOLACTONE 25 MG: 25 TABLET ORAL at 08:00

## 2024-06-28 RX ADMIN — POTASSIUM CHLORIDE 20 MEQ: 750 CAPSULE, EXTENDED RELEASE ORAL at 09:29

## 2024-06-28 RX ADMIN — Medication 10 ML: at 08:11

## 2024-06-28 RX ADMIN — OXYCODONE HYDROCHLORIDE AND ACETAMINOPHEN 500 MG: 500 TABLET ORAL at 08:00

## 2024-06-28 RX ADMIN — ISOSORBIDE MONONITRATE 60 MG: 60 TABLET, EXTENDED RELEASE ORAL at 08:00

## 2024-06-28 RX ADMIN — METHENAMINE HIPPURATE 1 G: 1000 TABLET ORAL at 08:00

## 2024-06-28 RX ADMIN — TRAMADOL HYDROCHLORIDE 50 MG: 50 TABLET ORAL at 15:53

## 2024-06-28 RX ADMIN — MAGNESIUM SULFATE IN DEXTROSE 1 G: 10 INJECTION, SOLUTION INTRAVENOUS at 20:06

## 2024-06-28 RX ADMIN — MAGNESIUM SULFATE IN DEXTROSE 1 G: 10 INJECTION, SOLUTION INTRAVENOUS at 22:15

## 2024-06-28 RX ADMIN — POTASSIUM CHLORIDE AND SODIUM CHLORIDE 100 ML/HR: 900; 300 INJECTION, SOLUTION INTRAVENOUS at 08:56

## 2024-06-28 RX ADMIN — MAGNESIUM SULFATE IN DEXTROSE 1 G: 10 INJECTION, SOLUTION INTRAVENOUS at 21:03

## 2024-06-28 RX ADMIN — LEFLUNOMIDE 20 MG: 20 TABLET ORAL at 08:00

## 2024-06-28 RX ADMIN — TRAMADOL HYDROCHLORIDE 50 MG: 50 TABLET ORAL at 09:29

## 2024-06-28 RX ADMIN — METHENAMINE HIPPURATE 1 G: 1000 TABLET ORAL at 20:08

## 2024-06-28 RX ADMIN — DICLOFENAC SODIUM 4 G: 10 GEL TOPICAL at 08:00

## 2024-06-28 RX ADMIN — ONDANSETRON 4 MG: 2 INJECTION INTRAMUSCULAR; INTRAVENOUS at 08:56

## 2024-06-28 RX ADMIN — Medication 1 TABLET: at 08:00

## 2024-06-28 RX ADMIN — ENOXAPARIN SODIUM 90 MG: 100 INJECTION SUBCUTANEOUS at 05:16

## 2024-06-28 RX ADMIN — FERROUS SULFATE TAB 325 MG (65 MG ELEMENTAL FE) 325 MG: 325 (65 FE) TAB at 08:00

## 2024-06-28 RX ADMIN — DULOXETINE HYDROCHLORIDE 60 MG: 30 CAPSULE, DELAYED RELEASE ORAL at 08:00

## 2024-06-28 RX ADMIN — DOCUSATE SODIUM AND SENNOSIDES 2 TABLET: 8.6; 5 TABLET, FILM COATED ORAL at 20:15

## 2024-06-28 RX ADMIN — FOLIC ACID 1000 MCG: 1 TABLET ORAL at 08:00

## 2024-06-28 RX ADMIN — LEVOTHYROXINE SODIUM 75 MCG: 0.07 TABLET ORAL at 05:16

## 2024-06-28 RX ADMIN — TRAMADOL HYDROCHLORIDE 50 MG: 50 TABLET ORAL at 21:32

## 2024-06-28 NOTE — PROGRESS NOTES
LOS: 7 days   Patient Care Team:  Moises Oliveira MD as PCP - General (Internal Medicine)  Moises Holman MD as Consulting Physician (Otolaryngology)  Christiano Rao PA as Physician Assistant (Otolaryngology)  Candelaria David MD as Obstetrician (Obstetrics and Gynecology)  Omar Hernandez MD as Cardiologist (Cardiology)  Leta Crawford APRN as Nurse Practitioner (Nurse Practitioner)  Leta Crawford APRN as Nurse Practitioner (Nurse Practitioner)    Chief Complaint:  UTI, stone, PINKY    Subjective     Interval History:     Improving overall per patient    Review of Systems  Pertinent items are noted in HPI     Objective     Vital Signs  Temp:  [97.2 °F (36.2 °C)-98.4 °F (36.9 °C)] 97.9 °F (36.6 °C)  Heart Rate:  [] 91  Resp:  [16] 16  BP: (117-148)/(44-66) 133/50    Physical Exam:  Horan in place, draining yellow urine.     Data Review:       I have reviewed the following data:    Basic Metabolic Panel (06/28/2024 04:04)     Medication Review:     Current Facility-Administered Medications:     acetaminophen (TYLENOL) tablet 650 mg, 650 mg, Oral, Q6H PRN, Ky Plascencia MD, 650 mg at 06/27/24 2056    ascorbic acid (VITAMIN C) tablet 500 mg, 500 mg, Oral, Daily, Ky Plascencia MD, 500 mg at 06/28/24 0800    aspirin EC tablet 81 mg, 81 mg, Oral, Daily, Ky Plascencia MD, 81 mg at 06/27/24 0951    sennosides-docusate (PERICOLACE) 8.6-50 MG per tablet 2 tablet, 2 tablet, Oral, BID PRN **AND** polyethylene glycol (MIRALAX) packet 17 g, 17 g, Oral, Daily PRN, 17 g at 06/15/24 1553 **AND** bisacodyl (DULCOLAX) EC tablet 5 mg, 5 mg, Oral, Daily PRN **AND** bisacodyl (DULCOLAX) suppository 10 mg, 10 mg, Rectal, Daily PRN, Ky Plascencia MD    Calcium Replacement - Follow Nurse / BPA Driven Protocol, , Does not apply, PRN, Ky Plascencia MD    carvedilol (COREG) tablet 25 mg, 25 mg, Oral, BID With Meals, Ky Plascencia MD, 25 mg at 06/27/24 0951    Diclofenac Sodium (VOLTAREN) 1 %  gel 4 g, 4 g, Topical, 4x Daily PRN, Ky Plascencia MD, 4 g at 06/28/24 0800    DULoxetine (CYMBALTA) DR capsule 60 mg, 60 mg, Oral, Daily, Ky Plascencia MD, 60 mg at 06/28/24 0800    Enoxaparin Sodium (LOVENOX) syringe 90 mg, 1 mg/kg, Subcutaneous, Q12H, Kris Cade MD, 90 mg at 06/28/24 0516    [START ON 7/1/2024] estradiol (ESTRACE) vaginal cream 2 applicator, 2 g, Vaginal, Once per day on Monday Thursday, Maggie Bang MD    ferrous sulfate tablet 325 mg, 325 mg, Oral, Daily With Breakfast, Ky Plascencia MD, 325 mg at 06/28/24 0800    folic acid (FOLVITE) tablet 1,000 mcg, 1,000 mcg, Oral, Daily, Ky Plascencia MD, 1,000 mcg at 06/28/24 0800    [Held by provider] furosemide (LASIX) tablet 40 mg, 40 mg, Oral, Daily, Ky Plascencia MD, 40 mg at 06/27/24 0951    isosorbide mononitrate (IMDUR) 24 hr tablet 60 mg, 60 mg, Oral, Daily, Ky Plascencia MD, 60 mg at 06/28/24 0800    leflunomide (ARAVA) tablet 20 mg, 20 mg, Oral, Daily, Ky Plascencia MD, 20 mg at 06/28/24 0800    levothyroxine (SYNTHROID, LEVOTHROID) tablet 75 mcg, 75 mcg, Oral, Q AM, Ky Plascencia MD, 75 mcg at 06/28/24 0516    Lidocaine 4 % 1 patch, 1 patch, Transdermal, Daily PRN, Ky Plascencia MD, 1 patch at 06/27/24 1033    losartan (COZAAR) tablet 50 mg, 50 mg, Oral, Daily, Ky Plascencia MD, 50 mg at 06/27/24 0950    Magnesium Low Dose Replacement - Follow Nurse / BPA Driven Protocol, , Does not apply, PRN, Ky Plascencia MD    methenamine (HIPREX) tablet 1 g, 1 g, Oral, BID, Ky Plascencia MD, 1 g at 06/28/24 0800    multivitamin with minerals 1 tablet, 1 tablet, Oral, Daily, Ky Plascencia MD, 1 tablet at 06/28/24 0800    [DISCONTINUED] Morphine sulfate (PF) injection 1 mg, 1 mg, Intravenous, Q4H PRN **AND** naloxone (NARCAN) injection 0.4 mg, 0.4 mg, Intravenous, Q5 Min PRN, Ky Plascencia MD    nitroglycerin (NITROSTAT) SL tablet 0.4 mg, 0.4 mg, Sublingual, Q5 Min PRN, Temo  Ky BECKMAN MD    ondansetron (ZOFRAN) injection 4 mg, 4 mg, Intravenous, Q6H PRN, Ky Plascencia MD, 4 mg at 06/28/24 0856    pantoprazole (PROTONIX) EC tablet 40 mg, 40 mg, Oral, Daily, Ky Plascencia MD, 40 mg at 06/27/24 0952    Phosphorus Replacement - Follow Nurse / BPA Driven Protocol, , Does not apply, Temo PALACIO Donald L, MD    Potassium Replacement - Follow Nurse / BPA Driven Protocol, , Does not apply, PRNTemo Donald L, MD    sodium chloride 0.9 % flush 10 mL, 10 mL, Intravenous, PRN, Ky Plascencia MD, 10 mL at 06/12/24 2200    sodium chloride 0.9 % flush 10 mL, 10 mL, Intravenous, Q12H, Ky Plascencia MD, 10 mL at 06/28/24 0811    sodium chloride 0.9 % flush 10 mL, 10 mL, Intravenous, PRNTemo Donald L, MD    sodium chloride 0.9 % infusion 40 mL, 40 mL, Intravenous, PRN, Ky Plascencia MD, 40 mL at 06/25/24 1144    sodium chloride 0.9 % with KCl 40 mEq/L infusion, 100 mL/hr, Intravenous, Continuous, Maggie Bang MD, Last Rate: 100 mL/hr at 06/28/24 0856, 100 mL/hr at 06/28/24 0856    spironolactone (ALDACTONE) tablet 25 mg, 25 mg, Oral, Daily, Ky Plascencia MD, 25 mg at 06/28/24 0800    traMADol (ULTRAM) tablet 50 mg, 50 mg, Oral, Q12H PRN, Ky Plascencia MD, 50 mg at 06/28/24 0929    Assessment and Plan:    Left ureter stone: POD#7. Stent will need to come out but would defer     PINKY: Continue Horan and stent for now. Unlikely to be obstructive. ID increased IVF; recommend Nephrology consult if no improvement.     Recurrent UTI: ID following. Defer current management to them. Recurrences could be from stone - will need to re-evaluate now that stone has been addressed.      Right leg pain: Primary team and ID aware.      URO DISPO: I will assess the patient again tomorrow.     I discussed the patient's findings and my recommendations with patient and family    (Please note that portions of this note were completed with a voice recognition program.)    Albaro CM  MD Destiny  06/28/24  10:38 CDT    Time: Time spent: 20 minutes spent performing evaluation and management, chart review, and discussion with patient, > 50% of time spent in face-to-face encounter

## 2024-06-28 NOTE — PLAN OF CARE
Goal Outcome Evaluation:  Plan of Care Reviewed With: patient      Bath and bed change. Turned q 2. Horan care. Complaints of some neck pain tylenol given. No nausea VSS, CTM  Progress: no change

## 2024-06-28 NOTE — PROGRESS NOTES
[facility] Medicine Services  INPATIENT PROGRESS NOTE    Patient Name: Tawny Shin  Date of Admission: 6/12/2024  Today's Date: 06/28/24  Length of Stay: 7  Primary Care Physician: Moises Oliveira MD    Subjective   Chief Complaint: Recurrent UTI, gallstone, kidney stone   HPI   She still having right lower extremity pain and some edema.  Fortunately Doppler venous studies and NISREEN are normal.  Will increase frequency of tramadol for pain.    6/27 Complains of right lower extremity pain from the hip to the foot.   No skin discoloration.  Wound on the right buttock appears clean and dry.  Eliquis remains on hold for possible cholecystectomy.    Review of Systems    Negative for chills and fever.   HENT:  Negative for hearing loss, nosebleeds, tinnitus and trouble swallowing.    Eyes:  Negative for visual disturbance.   Respiratory:  Negative for cough, chest tightness, shortness of breath and wheezing.    Cardiovascular:  Negative for chest pain, palpitations and leg swelling.   Gastrointestinal:  Negative for abdominal distention, abdominal pain, blood in stool, constipation, diarrhea, nausea and vomiting.   Endocrine: Negative for cold intolerance, heat intolerance, polydipsia, polyphagia and polyuria.   Genitourinary:  Negative for decreased urine volume, difficulty urinating, dysuria, flank pain, frequency and hematuria.   Musculoskeletal:  Positive for arthralgias, gait problem and myalgias. Negative for joint swelling.   Skin:  Negative for rash.   Allergic/Immunologic: Negative for immunocompromised state.   Neurological:  Positive for weakness. Negative for dizziness, syncope, light-headedness and headaches.   Hematological:  Negative for adenopathy. Does not bruise/bleed easily.   All pertinent negatives and positives are as above. All other systems have been reviewed and are negative unless otherwise stated.     Objective    Temp:  [97.6 °F (36.4 °C)-98.9 °F  (37.2 °C)] 98.9 °F (37.2 °C)  Heart Rate:  [] 88  Resp:  [16-17] 17  BP: (117-146)/(44-66) 122/60  Physical Exam  Vitals and nursing note reviewed.   Constitutional:       Comments: Chronically ill.  Deconditioning.   HENT:      Head: Normocephalic.   Eyes:      Conjunctiva/sclera: Conjunctivae normal.      Pupils: Pupils are equal, round, and reactive to light.   Cardiovascular:      Rate and Rhythm: Normal rate and regular rhythm.      Heart sounds: Normal heart sounds.   Pulmonary:      Effort: Pulmonary effort is normal. No respiratory distress.      Breath sounds: Normal breath sounds.   Abdominal:      General: Bowel sounds are normal. There is no distension.      Palpations: Abdomen is soft.      Comments: Obesity.  No tenderness noted with palpation.   Musculoskeletal:         General: No swelling.      Cervical back: Neck supple.   Skin:     General: Skin is warm and dry.      Capillary Refill: Capillary refill takes 2 to 3 seconds.      Findings: No rash.   Neurological:      Mental Status: She is alert and oriented to person, place, and time.      Motor: Weakness present.      Coordination: Coordination abnormal.      Gait: Gait abnormal.   Psychiatric:         Mood and Affect: Mood normal.         Behavior: Behavior normal.         Thought Content: Thought content normal.          Results Review:  I have reviewed the labs, radiology results, and diagnostic studies.    Laboratory Data:   Results from last 7 days   Lab Units 06/27/24  0556 06/26/24  0338 06/25/24  0503   WBC 10*3/mm3 4.86 4.39 4.18   HEMOGLOBIN g/dL 8.1* 7.2* 7.4*   HEMATOCRIT % 26.3* 23.8* 24.6*   PLATELETS 10*3/mm3 160 150 151        Results from last 7 days   Lab Units 06/28/24  0404 06/27/24  0556 06/26/24  0338 06/23/24  0428 06/22/24  0453   SODIUM mmol/L 137 136 140   < > 138   POTASSIUM mmol/L 3.0* 3.5 3.5   < > 4.2   CHLORIDE mmol/L 99 99 105   < > 103   CO2 mmol/L 25.0 25.0 25.0   < > 28.0   BUN mg/dL 14 15 14   < > 16    CREATININE mg/dL 1.22* 1.18* 1.07*   < > 0.93   CALCIUM mg/dL 8.9 9.1 8.5*   < > 9.1   BILIRUBIN mg/dL  --   --   --   --  0.4   ALK PHOS U/L  --   --   --   --  81   ALT (SGPT) U/L  --   --   --   --  18   AST (SGOT) U/L  --   --   --   --  19   GLUCOSE mg/dL 84 80 88   < > 99    < > = values in this interval not displayed.       Culture Data:   Urine Culture   Date Value Ref Range Status   06/21/2024 No growth  Final   06/16/2024 25,000 CFU/mL Enterobacter cloacae complex CRE (A)  Final     Comment:       Consider infectious disease consult.  Susceptibility results may not correlate to clinical outcomes.  Carbapenem resistant Enterobacteriaceae, patient may be an isolation risk.  No carbapenemase detected.       Radiology Data:   Imaging Results (Last 24 Hours)       Procedure Component Value Units Date/Time    US Ankle / Brachial Indices Extremity Complete [[id number]] Resulted: 06/27/24 1434     Updated: 06/27/24 1454    US Venous Doppler Lower Extremity Right (duplex) [[id number]] Resulted: 06/27/24 1427     Updated: 06/27/24 1454            I have reviewed the patient's current medications.     Assessment/Plan   Assessment  Active Hospital Problems    Diagnosis     **Altered mental status     Esophageal dysphagia     UTI (urinary tract infection)     Hydronephrosis with urinary obstruction due to ureteral calculus     Acute cholecystitis     Hypothyroidism     Stage 3b chronic kidney disease     Near functional paraplegia     Cholelithiasis     Essential hypertension     Sick sinus syndrome     Coronary artery disease involving native coronary artery of native heart without angina pectoris     Venous insufficiency     Rheumatoid arthritis involving multiple sites     Chronic heart failure with preserved ejection fraction     Class 1 obesity due to excess calories with serious comorbidity and body mass index (BMI) of 33.0 to 33.9 in adult     Functional neurological symptom disorder with weakness or paralysis      Chronic anticoagulation     Chronic embolism and thrombosis of unspecified deep veins of left lower extremity        Treatment Plan  Recurrent UTI/neurogenic bladder.  Cefepime/Flagyl antibiotics.  Continue HipRex.  Repeated UA, negative for bacteria.  Consult ID.  Urology consulted and following.   CT scan abdomen pelvic . Obstructive uropathy on the left with mild to moderate dilatation of the upper tracts of the left kidney and left ureter into the true pelvis where there is a 5 x 6 mm calculus within the left ureter approximately 4 to 5 cm above the UVJ- right ureter is decompressed and normal in appearance- No evidence of renal mass. No perinephric fluid collection present, gallbladder is mildly distended- gallstones within the gallbladder neck,  No pericholecystic inflammatory changes or biliary dilatation, Constipation,  No obstruction or free air, Normal appendix, Calcified fibroid within the lower uterine segment, Mild constipation, No obstruction or free air, Normal appendix, Small fat-containing periumbilical hernia, Previous kyphoplasty at T12, Previous fusion at L3-L4 and L4-L5, Left basilar atelectasis..  Recommendation from urology-evaluation of neurogenic bladder at a university setting like [place].     Obstructing renal calculi left side/moderate dilated of left ureter.  Callus 5 x 6 mm and left 4 to 5 cm above the UVJ junction.  Status post 6/21/2024 urethroscope laser lithotripsy with stent insertion left. Horan in place.     Gallstone, within the gallbladder neck/dysfunctional gallbladder.  Mild distended gallbladder.  Right upper quadrant tenderness.  Ultrasound of the gallbladder-Cholelithiasis with small shadowing stones and sludge within the region of the gallbladder neck- Moderate distention of the gallbladder- however no gallbladder wall thickening or pericholecystic fluid identified- No biliary dilatation.  HIDA scan-Nonvisualization of the gallbladder on images obtained up to 90  minutes- Cystic duct obstruction and acute cholecystitis cannot be excluded on the basis of this exam,  No evidence of common bile duct obstruction with emptying of activity into the C-loop of the duodenum and proximal jejunum, Gallstones and sludge were demonstrated on recent ultrasound- combined with nonvisualization of the gallbladder exclude performance of an ejection fraction..  Discussed with general surgery Dr. Jo-plan for gallbladder removal this coming Tuesday, DC Eliquis today 6/22/2024 start Lovenox today, nursing communication to stop Lovenox 24 hours before surgery on Monday.  Dr. Jo also recommended cardiac clearance for surgery. Cardiology states okay to DC Lovenox today.    Cholecystectomy delay due to waxing and waning encephalopathy and concern for further cognitive decline.  General surgery considering referral for interventional radiologist to percutaneous drain.     Reflux/esophageal dysphagia. Dysphagia recent esophageal stretching, GI evaluated and recommended to continue outpatient follow-up.  SLP evaluated and recommended to continue regular diet.  Protonix . Zofran as needed.     Altered mental status/encephalopathy.  Consult neurology  She does not seem to receive anything overnight that could cause worsening mental status changes.  Metronidazole has an incidence of encephalopathy but average is a 28 days and worsening patients with liver disease which makes shortness seem to have at this point.  CT scan the head-No acute intracranial abnormality, Chronic sinusitis and left mastoid effusion.  Repeat this morning.  Medications reviewed  Chest x-ray-Stable chest exam without acute process, No visualized infiltrate.   Patient is on room air.    Right lower extremity pain  Lovenox full dose.  Continue to hold Eliquis  Doppler right lower extremity and NISREEN normal.  Pain control.     Hypothyroidism.  Synthroid.     Chronic stage IIIb renal failure.  Creatinine normalized.      Hypomagnesia. Resolved.     Hyponatremia.  Resolved.     Hypokalemia.  Resolved.  Magnesium normal.     Hypertension/sick sinus syndrome/venous insufficiency/CHF.  Eliquis.  Aspirin . Coreg . Lasix.  Imdur.  Cozaar . Aldactone.  Nitro as needed.     Right gluteal wound.  Consult wound care.     Rheumatoid arthritis. Arava.     Depression/anxiety.  Cymbalta.     Postmenopausal.  Estradiol cream.     Anemia.  Hemoglobin decreased.  Iron sulfate.  Folic acid.  No sign of acute bleed.     Pain . lidocaine patch.  Voltaren gel.  Ultram as needed.     Chronic DVT.  DC Eliquis due to possible surgery, on Lovenox.    Functional neurological symptom disorder with paraplegia.     Nutrition.  Cardiac diet.  Vitamin C.  Multivitamins.  Regular/house diet.  Boost supplement.     Deconditioning.  PT and OT consult.  Patient use a walker and bedbound at baseline.     Urine culture-100,000 CFU/mL Enterobacter cloacae complex  25,000 CFU/mL Enterobacter cloacae complex CRE   Blood culture-no growth for 5 days.     Dr. Shin's mom.  Patient already have home health at home.  Patient request for lift at discharge.     Medical Decision Making  Number and Complexity of problems: Recurrent UTI/altered mental status/reflux/rheumatoid arthritis/chronic 3B renal failure  Differential Diagnosis: None     Conditions and Status        Condition is unchanged.     MDM Data  External documents reviewed: Previous note .  Cardiac tracing (EKG, telemetry) interpretation: Sinus.  Radiology interpretation: X-ray/CT scan of the head  Labs reviewed: Laboratory  Any tests that were considered but not ordered: Lab in a.m.     Decision rules/scores evaluated (example [id number], [place], etc): None     Discussed with: Patient and family     Care Planning  Shared decision making: Patient and family  Code status and discussions: DNR     Disposition  Social Determinants of Health that impact treatment or disposition: From home  1 to 3  days.    Electronically signed by Kris Cade MD, 06/28/24, 13:43 CDT.

## 2024-06-28 NOTE — PLAN OF CARE
Goal Outcome Evaluation:   VSS. A&Ox3. Disoriented to situation. Room air. Complaints of pain to RLE. See MAR for intervention. Turns per patient request. Horan in place. Draining clear yellow urine. Will continue to monitor.

## 2024-06-28 NOTE — PROGRESS NOTES
Inpatient Nutrition Services  Patient Name:  Tawny Shin  YOB: 1955  MRN: [id number]  Admit Date:  6/12/2024  Assessment Date:  6/28/2024   Reason for Assessment       Row Name 06/28/24 1110          Reason for Assessment    Reason For Assessment follow-up protocol     Diagnosis neurologic conditions;other (see comments);infection/sepsis;gastrointestinal disease                    Nutrition/Diet History       Row Name 06/28/24 1111          Nutrition/Diet History    Typical Intake (Food/Fluid/EN/PN) Pt continues to have a poor appetite. Pt complained of nausea this morning per RN, held off on giving meds to prevent N/V. Pt with sitter and family at this time. Sending cardiac diets, Mirza and Boost Original. Minimal PO has been recorded. BM 6/25. Wound care continues. Edema to lower extremities continues. No new weights since admission. Micronutrients on MAR. May consider adding probiotic given pts recurrent UTIs. Will continue current nutrition care plan and monitor per protocol.     Food Intolerance(s) NKFA     Factors Affecting Nutritional Intake cognitive status/motor function;nausea;appetite                    Labs/Tests/Procedures/Meds       Row Name 06/28/24 1113          Labs/Procedures/Meds    Lab Results Reviewed reviewed     Lab Results Comments Cr, K+, Mg        Diagnostic Tests/Procedures    Diagnostic Test/Procedure Reviewed reviewed        Medications    Pertinent Medications Reviewed reviewed     Pertinent Medications Comments See MAR                    Physical Findings       Row Name 06/28/24 1114          Physical Findings    Overall Physical Appearance GCS 14, room air, 2+ legs, knees, and left ankle/foot, 3+ edema right ankle/foot, BM 6/25, David Score 16 with right gluteal incision/wound.                    Estimated/Assessed Needs - Anthropometrics       Row Name 06/28/24 1114          Anthropometrics    Weight for Calculation 94.8 kg (209 lb)        Estimated/Assessed  Needs    Additional Documentation Fluid Requirements (Group);KCAL/KG (Group);Protein Requirements (Group)        KCAL/KG    KCAL/KG 18 Kcal/Kg (kcal)     18 Kcal/Kg (kcal) 1706.436        Protein Requirements    Weight Used For Protein Calculations 59 kg (130 lb)  IBW     Est Protein Requirement Amount (gms/kg) 1.3 gm protein     Estimated Protein Requirements (gms/day) 76.66        Fluid Requirements    Fluid Requirements (mL/day) 1706     RDA Method (mL) 1706                    Nutrition Prescription Ordered       Row Name 06/28/24 1115          Nutrition Prescription PO    Current PO Diet Regular     Fluid Consistency Thin     Supplement Other (comment);Mirza  Boost Original     Supplement Frequency Daily     Common Modifiers Cardiac                    Evaluation of Received Nutrient/Fluid Intake       Row Name 06/28/24 1115          Nutrient/Fluid Evaluation    Number of Days Evaluated 3 days        Fluid Intake Evaluation    Oral Fluid (mL) 2700  6/27/24 total; no PO fluid recorded 6/25-6/26        PO Evaluation    Number of Days PO Intake Evaluated Insufficient Data     Number of Meals 1     % PO Intake 50; supplements recorded x 2= 0%, 25%                       Problem/Interventions:   Problem 1       Row Name 06/28/24 1116          Nutrition Diagnoses Problem 1    Problem 1 Inadequate Nutrient Intake     Etiology (related to) Medical Diagnosis     Fluid Status Edema     Gastrointestinal Other (comment);Cholecystitis  cholelithiasis     Infectious Disease UTI;Other (comment)  recurrent     Neurological AMS     Renal CKD;Other (comment)  Cr at baseline     Skin Surgical wound;Pressure injury     Signs/Symptoms (evidenced by) NPO;Report/Observation     Percent (%) intake recorded 50 %     Over number of meals 1     Reported/Observed By RN     Appetite Poor     Reported GI Symptoms Nausea and vomiting;Other (comment)  no emesis x 72hr                          Intervention Goal       Row Name 06/28/24 1113           Intervention Goal    General Disease management/therapy;Meet nutritional needs for age/condition;Reduce/improve symptoms     PO Tolerate PO;Meet estimated needs;Increase intake     Weight No significant weight loss                    Nutrition Intervention       Row Name 06/28/24 1116          Nutrition Intervention    RD/Tech Action Follow Tx progress;Care plan reviewd                    Nutrition Prescription       Row Name 06/28/24 1116          Other Orders    Other rec probiotic                    Education/Evaluation       Row Name 06/28/24 1116          Education    Education No discharge needs identified at this time        Monitor/Evaluation    Monitor Per protocol                     Electronically signed by:  Gricel Deluca RDN, NILA  06/28/24 11:16 CDT

## 2024-06-28 NOTE — THERAPY TREATMENT NOTE
Acute Care - Physical Therapy Treatment Note  [facility]     Patient Name: Tawny Shin  : 1955  MRN: [id number]  Today's Date: 2024      Visit Dx:     ICD-10-CM ICD-9-CM   1. Acute UTI (urinary tract infection)  N39.0 599.0   2. Altered mental status, unspecified altered mental status type  R41.82 780.97   3. Open wound of right buttock, sequela  S31.819S 906.0   4. Cyst of buttocks  L72.9 706.2   5. Dysphagia, unspecified type  R13.10 787.20   6. Impaired mobility [Z74.09]  Z74.09 799.89   7. Hydronephrosis with urinary obstruction due to ureteral calculus  N13.2 592.1     591   8. Acute cholecystitis  K81.0 575.0   9. Acute congestive heart failure, unspecified heart failure type  I50.9 428.0     Patient Active Problem List   Diagnosis    T12 compression fracture, initial encounter    Chronic embolism and thrombosis of unspecified deep veins of left lower extremity    Chronic anticoagulation    Iron deficiency anemia    Osteoporosis    E coli bacteremia    Epidural hematoma    Pleural effusion, left    Functional neurological symptom disorder with weakness or paralysis    Class 1 obesity due to excess calories with serious comorbidity and body mass index (BMI) of 33.0 to 33.9 in adult    Rheumatoid arthritis involving multiple sites    Chronic heart failure with preserved ejection fraction    Venous insufficiency    Coronary artery disease involving native coronary artery of native heart without angina pectoris    Sick sinus syndrome    Essential hypertension    Pressure injury of skin of heel    Chronic pain    Anemia of chronic disease    Cholelithiasis    Pressure injury of skin of buttock    Near functional paraplegia    Skin cancer    Squamous cell carcinoma of back    Hematoma    Right-sided chest wall pain    Hyperlipidemia LDL goal <70    Malodorous urine    Stage 3b chronic kidney disease    Cyst of buttocks    Sepsis due to Escherichia coli without acute organ dysfunction    Generalized  weakness    Paralysis    History of urinary retention    Bacteremia due to Enterobacter species    Bacteremia    Hypothyroidism    Abnormal CT of the abdomen    Presence of cardiac pacemaker    Altered mental status    UTI (urinary tract infection)    Esophageal dysphagia    Hydronephrosis with urinary obstruction due to ureteral calculus    Acute cholecystitis     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    Elevated cholesterol     Eustachian tube dysfunction     Heart disease     Herpes simplex     History of transfusion     Hyperlipidemia     Hypertension     Hypothyroidism 12/23/2020    Intrinsic asthma 12/23/2020    Knee dislocation     Labral tear of right hip joint     Laryngitis sicca     Laryngitis, chronic     Left carotid bruit 03/09/2016    MI (myocardial infarction)     Myalgia due to statin 06/25/2019    Open wound of right hip 09/14/2021    Osteomyelitis of right femur 07/06/2021    Otorrhea     Pacemaker 11/17/2016    Primary osteoarthritis of left knee 12/23/2020    Psoriasis vulgaris 12/23/2020    S/P coronary artery stent placement 03/09/2016    Sensorineural hearing loss     Seropositive rheumatoid arthritis of multiple sites 12/27/2019    Overview:  -myochrysine '93-'96 -methotrexate '96--->11/98;r/s  restarted 2/99--> 8/14 (anemia) -sulfasalazine- not effective -penicillamine 6/98-->10/98; no effect -leflunomide 11/98--> - Humira '13-->didn't take - Enbrel 12/14-->3/15- no  "effect!   Last Assessment & Plan:  - \"aching all over\" because she had to be off her anti-rheumatic drugs for 2 weeks in preparation for her R knee surgery - he    Sick sinus syndrome 12/27/2019    Sjogren's disease     Spondylolisthesis of lumbar region 01/17/2018    Syncope, recurrent 02/08/2021    Urinary tract infection     UTI (urinary tract infection) 04/19/2024     Past Surgical History:   Procedure Laterality Date    A-V CARDIAC PACEMAKER INSERTION  2016    ATRIAL CARDIAC PACEMAKER INSERTION      CARDIAC CATHETERIZATION      CATARACT EXTRACTION      CERVICAL CORPECTOMY N/A 3/3/2021    Procedure: CERVICAL 6 CORPECTOMY WITH TITANIUM CAGE WITH NEURO MONITORING;  Surgeon: Bandar Shea MD;  Location:  [facility] OR;  Service: Neurosurgery;  Laterality: N/A;    COLONOSCOPY  11/08/2011    One fold in the ascending colon which showed ulcer otherwise normal exam    COLONOSCOPY  11/12/2004    Normal exam repeat in five years    CORONARY ANGIOPLASTY WITH STENT PLACEMENT      X 2; 2013 & 2014    ENDOSCOPY  07/10/2014    Normal exam    ENDOSCOPY N/A 5/30/2024    Procedure: ESOPHAGOGASTRODUODENOSCOPY WITH ANESTHESIA;  Surgeon: Taiwo Sanchez MD;  Location: [facility] ENDOSCOPY;  Service: Gastroenterology;  Laterality: N/A;  preop; dysphagia  postop: balloon dilation  PCP Jesus Manuel jeff    FLAP LEG Right 9/14/2021    Procedure: RIGHT GLUTEAL FASCIOCUTANEOUS ADVANCEMENT FLAP AND RIGHT TENSOR FASCIAL JESSICA FLAP;  Surgeon: Amadeo Turner MD;  Location: [facility] OR;  Service: Plastics;  Laterality: Right;    HIP ABDUCTION TENOTOMY BILATERAL Right 1/14/2021    Procedure: RIGHT HIP GLUTEUS MEDLUS / MINIMUS REPAIR, POSSIBLE ACHILLES ALLOGRAFT;  Surgeon: Nino Carlson MD;  Location: [facility] OR;  Service: Orthopedics;  Laterality: Right;    INCISION AND DRAINAGE ABSCESS Right 6/4/2022    Procedure: INCISION AND DRAINAGE ABSCESS right hip;  Surgeon: Magda Salcido MD;  Location:  [facility] OR;  Service: General;  Laterality: Right; "    INCISION AND DRAINAGE ABSCESS Right 6/10/2022    Procedure: RIGHT HIP INCISION AND DRAINAGE. MD NEEDS 3L VANC IRRIGATION, CURRETTES, DAICANS, KERLEX ROLLS;  Surgeon: Amadeo Turner MD;  Location:  [facility] OR;  Service: Plastics;  Laterality: Right;    INCISION AND DRAINAGE HIP Right 2/9/2021    Procedure: HIP INCISION AND DRAINAGE;  Surgeon: Nino Carlson MD;  Location:  [facility] OR;  Service: Orthopedics;  Laterality: Right;    INCISION AND DRAINAGE LEG Right 10/24/2021    Procedure: INCISION AND DRAINAGE LOWER EXTREMITY;  Surgeon: Amadeo Turner MD;  Location:  [facility] OR;  Service: Plastics;  Laterality: Right;    INCISION AND DRAINAGE OF WOUND Right 7/8/2021    Procedure: INCISION AND DRAINAGE WOUND RIGHT HIP;  Surgeon: James Huntley MD;  Location:  [facility] OR;  Service: Orthopedics;  Laterality: Right;    JOINT REPLACEMENT      KYPHOPLASTY WITH BIOPSY Bilateral 10/26/2021    Procedure: THOARCIC 12 KYPHOPLASTY WITH BIOPSY;  Surgeon: Bandar Shea MD;  Location:  [facility] OR;  Service: Neurosurgery;  Laterality: Bilateral;    LEG DEBRIDEMENT Right 9/14/2021    Procedure: DEBRIDEMENT OF RIGHT HIP WOUND, RIGHT GLUTEAL FASCIOCUTANEOUS ADVANCEMENT FLAP AND RIGHT TENSOR FASCIAL JESSICA FLAP;  Surgeon: Amadeo Turner MD;  Location:  [facility] OR;  Service: Plastics;  Laterality: Right;    LUMBAR DISCECTOMY Right 3/23/2021    Procedure: LUMBAR DISCECTOMY MICRO, Lumbar 1/2 right;  Surgeon: Bandar Shea MD;  Location:  [facility] OR;  Service: Neurosurgery;  Laterality: Right;    LUMBAR FUSION N/A 1/19/2018    Procedure: L3-4,L4-5 DECOMPRESSION, POSTERIOR SPINAL FUSION WITH INSTRUMENTATION;  Surgeon: Fortino Oropeza MD;  Location:  [facility] OR;  Service:     LUMBAR LAMINECTOMY WITH FUSION Left 1/17/2018    Procedure: LEFT L3-4 L4-5 LATERAL LUMBAR INTERBODY FUSION;  Surgeon: Fortino Oropeza MD;  Location:  [facility] OR;  Service:     MASS EXCISION Right 4/23/2024    Procedure: RIGHT BUTTOCK MASS  "EXCISION;  Surgeon: Moises Keyes MD;  Location:  [facility] OR;  Service: General;  Laterality: Right;    MYRINGOTOMY W/ TUBES  09/04/2014    TUBES NO LONGER IN PLACE    OTHER SURGICAL HISTORY      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 so hardware was removed and spacers were placed    REPLACEMENT TOTAL KNEE Right     URETEROSCOPY LASER LITHOTRIPSY WITH STENT INSERTION N/A 6/21/2024    Procedure: URETEROSCOPY LASER LITHOTRIPSY WITH STENT INSERTION LEFT;  Surgeon: Ky Plascencia MD;  Location:  [facility] OR;  Service: Urology;  Laterality: N/A;     PT Assessment (Last 12 Hours)       PT Evaluation and Treatment       Row Name 06/28/24 1452 06/28/24 0952       Physical Therapy Time and Intention    Subjective Information complains of;pain  -NW complains of;pain;weakness;fatigue  -NW    Document Type therapy note (daily note)  -NW therapy note (daily note)  -NW    Mode of Treatment physical therapy  -NW physical therapy  -NW    Patient Effort -- good  -NW    Comment increased pain in RLE \"from my toes to my hip\"  -NW --      Row Name 06/28/24 1452 06/28/24 0952       General Information    Existing Precautions/Restrictions fall  -NW fall  -NW      Row Name 06/28/24 1452 06/28/24 0952       Pain    Pretreatment Pain Rating -- 5/10  -NW    Posttreatment Pain Rating 7/10  -NW 5/10  -NW    Pain Location - Side/Orientation Right  -NW Right  -NW    Pain Location lower  -NW lower  -NW    Pain Location - extremity  -NW extremity  -NW      Row Name 06/28/24 1452 06/28/24 0952       Bed Mobility    Rolling Left [place] (Bed Mobility) verbal cues;moderate assist (50% patient effort)  -NW moderate assist (50% patient effort)  -NW    Rolling Right [place] (Bed Mobility) verbal cues;moderate assist (50% patient effort)  -NW moderate assist (50% patient effort)  -NW    Supine-Sit [place] (Bed Mobility) verbal cues;moderate assist (50% patient effort)  -NW moderate assist (50% patient effort);verbal cues  -NW    Sit-Supine " [place] (Bed Mobility) verbal cues;moderate assist (50% patient effort);1 person assist;2 person assist  -NW moderate assist (50% patient effort);verbal cues  -NW      Row Name 06/28/24 0952          Sit-Stand Transfer    Sit-Stand [place] (Transfers) verbal cues;moderate assist (50% patient effort);2 person assist  -NW     Assistive Device (Sit-Stand Transfers) walker, front-wheeled  -NW       Row Name 06/28/24 0952          Stand-Sit Transfer    Stand-Sit [place] (Transfers) verbal cues;2 person assist;moderate assist (50% patient effort)  -NW     Assistive Device (Stand-Sit Transfers) walker, front-wheeled  -NW       Row Name 06/28/24 1452 06/28/24 0952       Gait/Stairs (Locomotion)    Comment, (Gait/Stairs) pt c/o increased pain and unable to stand  -NW stood x3 unable to take steps due to weakness, attempt marching in place pt unable  -NW      Row Name 06/28/24 1452 06/28/24 0952       Safety Issues, Functional Mobility    Safety Issues Affecting Function (Mobility) safety precaution awareness  -NW --    Impairments Affecting Function (Mobility) pain;strength;range of motion (ROM)  -NW pain;strength  -NW      Row Name 06/28/24 1452 06/28/24 0952       Motor Skills    Comments, Therapeutic Exercise A-AAROM BLEs sitting EOB and AROM BUEs  -NW A/AAROM BLEs sitting EOB  -NW      Row Name             Wound 04/23/24 0936 Right gluteal Incision    Wound - Properties Group Placement Date: 04/23/24  -EP Placement Time: 0936  -EP Present on Original Admission: N  -EP Side: Right  -EP Location: gluteal  -EP Primary Wound Type: Incision  -EP    Retired Wound - Properties Group Placement Date: 04/23/24  -EP Placement Time: 0936  -EP Present on Original Admission: N  -EP Side: Right  -EP Location: gluteal  -EP Primary Wound Type: Incision  -EP    Retired Wound - Properties Group Date first assessed: 04/23/24  -EP Time first assessed: 0936  -EP Present on Original Admission: N  -EP Side: Right  -EP Location:  gluteal  -EP Primary Wound Type: Incision  -EP      Row Name 06/28/24 1452 06/28/24 0952       Positioning and Restraints    Pre-Treatment Position in bed  -NW in bed  -NW    Post Treatment Position bed  -NW bed  -NW    In Bed fowlers;call light within reach;encouraged to call for assist;with family/caregiver  notiifed nsg for pain meds  -NW fowlers;call light within reach;encouraged to call for assist;with family/caregiver  -NW              User Key  (r) = Recorded By, (t) = Taken By, (c) = Cosigned By      Initials Name Provider Type    NW Angle Nagel, PTA Physical Therapist Assistant    Sonny Thompson RN Registered Nurse                    Physical Therapy Education       Title: PT OT SLP Therapies (Done)       Topic: Physical Therapy (Done)       Point: Mobility training (Done)       Learning Progress Summary             Patient Acceptance, E, VU by KS at 6/20/2024 1715    Acceptance, E, VU by MS at 6/18/2024 1502    Comment: role of PT in her care                         Point: Home exercise program (Done)       Learning Progress Summary             Patient Acceptance, E, VU by KS at 6/20/2024 1715    Acceptance, E,D,TB, VU,DU,NR by  at 6/15/2024 1514    Acceptance, E,TB,D, VU,DU,NR by  at 6/14/2024 1550                         Point: Body mechanics (Done)       Learning Progress Summary             Patient Acceptance, E, VU by KS at 6/20/2024 1715    Acceptance, E,D,TB, VU,DU,NR by  at 6/15/2024 1514    Acceptance, E,TB,D, VU,DU,NR by  at 6/14/2024 1550                         Point: Precautions (Done)       Learning Progress Summary             Patient Acceptance, E, VU by KS at 6/20/2024 1715    Acceptance, E,D,TB, VU,DU,NR by  at 6/15/2024 1514    Acceptance, E,TB,D, VU,DU,NR by  at 6/14/2024 1550                                         User Key       Initials Effective Dates Name Provider Type Discipline    MS 07/11/23 -  Nicole Olson, PT, DPT, NCS Physical Therapist PT    KS  02/27/23 -  Ewelina Morales RN Registered Nurse Nurse     10/17/23 -  Stephanie Doll, RN Registered Nurse Nurse                  PT Recommendation and Plan     Plan of Care Reviewed With: patient  Progress: no change  Outcome Evaluation: Bed mobility mod x2. Sat EOB working on sit-stands x3 mod x2, pt unable to take steps due to weakness, attempt marching in place pt unable as well. Tolerated A-AAROM BLEs sitting EOB. Pt will need cont therapy  upon d/c for strength, mobility and safety   Outcome Measures       Row Name 06/27/24 1400 06/26/24 1400 06/26/24 1300       How much help from another person do you currently need...    Turning from your back to your side while in flat bed without using bedrails? 2  -KJ 2  -KJ --    Moving from lying on back to sitting on the side of a flat bed without bedrails? 2  -KJ 2  -KJ --    Moving to and from a bed to a chair (including a wheelchair)? 1  -KJ 2  -KJ --    Standing up from a chair using your arms (e.g., wheelchair, bedside chair)? 1  -KJ 1  -KJ --    Climbing 3-5 steps with a railing? 1  -KJ 1  -KJ --    To walk in hospital room? 1  -KJ 1  -KJ --    AM-PAC 6 Clicks Score (PT) 8  -KJ 9  -KJ --    Highest Level of Mobility Goal 3 --> Sit at edge of bed  -KJ 3 --> Sit at edge of bed  -KJ --       How much help from another is currently needed...    Putting on and taking off regular lower body clothing? -- -- 2  -TS    Bathing (including washing, rinsing, and drying) -- -- 2  -TS    Toileting (which includes using toilet bed pan or urinal) -- -- 2  -TS    Putting on and taking off regular upper body clothing -- -- 2  -TS    Taking care of personal grooming (such as brushing teeth) -- -- 3  -TS    Eating meals -- -- 3  -TS    AM-PAC 6 Clicks Score (OT) -- -- 14  -TS       Functional Assessment    Outcome Measure Options AM-PAC 6 Clicks Basic Mobility (PT)  -KJ AM-[facility] 6 Clicks Basic Mobility (PT)  -KJ --              User Key  (r) = Recorded By, (t) = Taken By, (c) =  Cosigned By      Initials Name Provider Type    Emilee Gloria, PTA Physical Therapist Assistant    Carolee Roger, GALLITO Occupational Therapist Assistant                     Time Calculation:    PT Charges       Row Name 06/28/24 1533 06/28/24 1128 06/28/24 1022       Time Calculation    Start Time 1452  -NW -- 0952  -NW    Stop Time 1533  -NW -- 1023  -NW    Time Calculation (min) 41 min  -NW -- 31 min  -NW    PT Received On -- -- 06/28/24  -NW    PT Goal Re-Cert Due Date -- 07/12/24  -MS 06/28/24  -NW       Time Calculation- PT    Total Timed Code Minutes- PT 41 minute(s)  -NW -- 31 minute(s)  -NW       Timed Charges    12801 - PT Therapeutic Exercise Minutes 16  -NW -- --    45366 - PT Therapeutic Activity Minutes 25  -NW -- 31  -NW       Total Minutes    Timed Charges Total Minutes 41  -NW -- 31  -NW     Total Minutes 41  -NW -- 31  -NW              User Key  (r) = Recorded By, (t) = Taken By, (c) = Cosigned By      Initials Name Provider Type    Nicole Palacios R, PT, DPT, NCS Physical Therapist    NW Angle Nagel, SERENA Physical Therapist Assistant                  Therapy Charges for Today       Code Description Service Date Service Provider Modifiers Qty    45799684796 HC PT THERAPEUTIC ACT EA 15 MIN 6/28/2024 Angle Nagel, SERENA GP 2    04518864834 HC PT THER PROC EA 15 MIN 6/28/2024 Angle Nagel, SERENA GP 1    95537514017 HC PT THERAPEUTIC ACT EA 15 MIN 6/28/2024 Angle Nagel PTA GP 2            PT G-Codes  Outcome Measure Options: AM-PAC 6 Clicks Daily Activity (OT)  AM-PAC 6 Clicks Score (PT): 12  AM-PAC 6 Clicks Score (OT): 13    Angle Nagel PTA  6/28/2024

## 2024-06-28 NOTE — PLAN OF CARE
Goal Outcome Evaluation:  Plan of Care Reviewed With: patient        Progress: no change  Outcome Evaluation: Bed mobility mod x2. Sat EOB working on sit-stands x3 mod x2, pt unable to take steps due to weakness, attempt marching in place pt unable as well. Tolerated A-AAROM BLEs sitting EOB. Pt will need cont therapy  upon d/c for strength, mobility and safety

## 2024-06-28 NOTE — THERAPY RE-EVALUATION
Patient Name: Tawny Shin  : 1955    MRN: [id number]                              Today's Date: 2024       Admit Date: 2024    Visit Dx:     ICD-10-CM ICD-9-CM   1. Acute UTI (urinary tract infection)  N39.0 599.0   2. Altered mental status, unspecified altered mental status type  R41.82 780.97   3. Open wound of right buttock, sequela  S31.819S 906.0   4. Cyst of buttocks  L72.9 706.2   5. Dysphagia, unspecified type  R13.10 787.20   6. Impaired mobility [Z74.09]  Z74.09 799.89   7. Hydronephrosis with urinary obstruction due to ureteral calculus  N13.2 592.1     591   8. Acute cholecystitis  K81.0 575.0   9. Acute congestive heart failure, unspecified heart failure type  I50.9 428.0     Patient Active Problem List   Diagnosis    T12 compression fracture, initial encounter    Chronic embolism and thrombosis of unspecified deep veins of left lower extremity    Chronic anticoagulation    Iron deficiency anemia    Osteoporosis    E coli bacteremia    Epidural hematoma    Pleural effusion, left    Functional neurological symptom disorder with weakness or paralysis    Class 1 obesity due to excess calories with serious comorbidity and body mass index (BMI) of 33.0 to 33.9 in adult    Rheumatoid arthritis involving multiple sites    Chronic heart failure with preserved ejection fraction    Venous insufficiency    Coronary artery disease involving native coronary artery of native heart without angina pectoris    Sick sinus syndrome    Essential hypertension    Pressure injury of skin of heel    Chronic pain    Anemia of chronic disease    Cholelithiasis    Pressure injury of skin of buttock    Near functional paraplegia    Skin cancer    Squamous cell carcinoma of back    Hematoma    Right-sided chest wall pain    Hyperlipidemia LDL goal <70    Malodorous urine    Stage 3b chronic kidney disease    Cyst of buttocks    Sepsis due to Escherichia coli without acute organ dysfunction    Generalized  weakness    Paralysis    History of urinary retention    Bacteremia due to Enterobacter species    Bacteremia    Hypothyroidism    Abnormal CT of the abdomen    Presence of cardiac pacemaker    Altered mental status    UTI (urinary tract infection)    Esophageal dysphagia    Hydronephrosis with urinary obstruction due to ureteral calculus    Acute cholecystitis     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    Elevated cholesterol     Eustachian tube dysfunction     Heart disease     Herpes simplex     History of transfusion     Hyperlipidemia     Hypertension     Hypothyroidism 12/23/2020    Intrinsic asthma 12/23/2020    Knee dislocation     Labral tear of right hip joint     Laryngitis sicca     Laryngitis, chronic     Left carotid bruit 03/09/2016    MI (myocardial infarction)     Myalgia due to statin 06/25/2019    Open wound of right hip 09/14/2021    Osteomyelitis of right femur 07/06/2021    Otorrhea     Pacemaker 11/17/2016    Primary osteoarthritis of left knee 12/23/2020    Psoriasis vulgaris 12/23/2020    S/P coronary artery stent placement 03/09/2016    Sensorineural hearing loss     Seropositive rheumatoid arthritis of multiple sites 12/27/2019    Overview:  -myochrysine '93-'96 -methotrexate '96--->11/98;r/s  restarted 2/99--> 8/14 (anemia) -sulfasalazine- not effective -penicillamine 6/98-->10/98; no effect -leflunomide 11/98--> - Humira '13-->didn't take - Enbrel 12/14-->3/15- no  "effect!   Last Assessment & Plan:  - \"aching all over\" because she had to be off her anti-rheumatic drugs for 2 weeks in preparation for her R knee surgery - he    Sick sinus syndrome 12/27/2019    Sjogren's disease     Spondylolisthesis of lumbar region 01/17/2018    Syncope, recurrent 02/08/2021    Urinary tract infection     UTI (urinary tract infection) 04/19/2024     Past Surgical History:   Procedure Laterality Date    A-V CARDIAC PACEMAKER INSERTION  2016    ATRIAL CARDIAC PACEMAKER INSERTION      CARDIAC CATHETERIZATION      CATARACT EXTRACTION      CERVICAL CORPECTOMY N/A 3/3/2021    Procedure: CERVICAL 6 CORPECTOMY WITH TITANIUM CAGE WITH NEURO MONITORING;  Surgeon: Bandar Shea MD;  Location:  [facility] OR;  Service: Neurosurgery;  Laterality: N/A;    COLONOSCOPY  11/08/2011    One fold in the ascending colon which showed ulcer otherwise normal exam    COLONOSCOPY  11/12/2004    Normal exam repeat in five years    CORONARY ANGIOPLASTY WITH STENT PLACEMENT      X 2; 2013 & 2014    ENDOSCOPY  07/10/2014    Normal exam    ENDOSCOPY N/A 5/30/2024    Procedure: ESOPHAGOGASTRODUODENOSCOPY WITH ANESTHESIA;  Surgeon: Taiwo Sanchez MD;  Location: [facility] ENDOSCOPY;  Service: Gastroenterology;  Laterality: N/A;  preop; dysphagia  postop: balloon dilation  PCP Jesus Manuel jeff    FLAP LEG Right 9/14/2021    Procedure: RIGHT GLUTEAL FASCIOCUTANEOUS ADVANCEMENT FLAP AND RIGHT TENSOR FASCIAL JESSICA FLAP;  Surgeon: Amadeo Turner MD;  Location: [facility] OR;  Service: Plastics;  Laterality: Right;    HIP ABDUCTION TENOTOMY BILATERAL Right 1/14/2021    Procedure: RIGHT HIP GLUTEUS MEDLUS / MINIMUS REPAIR, POSSIBLE ACHILLES ALLOGRAFT;  Surgeon: Nino Carlson MD;  Location: [facility] OR;  Service: Orthopedics;  Laterality: Right;    INCISION AND DRAINAGE ABSCESS Right 6/4/2022    Procedure: INCISION AND DRAINAGE ABSCESS right hip;  Surgeon: Magda Salcido MD;  Location:  [facility] OR;  Service: General;  Laterality: Right; "    INCISION AND DRAINAGE ABSCESS Right 6/10/2022    Procedure: RIGHT HIP INCISION AND DRAINAGE. MD NEEDS 3L VANC IRRIGATION, CURRETTES, DAICANS, KERLEX ROLLS;  Surgeon: Amadeo Turner MD;  Location:  [facility] OR;  Service: Plastics;  Laterality: Right;    INCISION AND DRAINAGE HIP Right 2/9/2021    Procedure: HIP INCISION AND DRAINAGE;  Surgeon: Nino Carlson MD;  Location:  [facility] OR;  Service: Orthopedics;  Laterality: Right;    INCISION AND DRAINAGE LEG Right 10/24/2021    Procedure: INCISION AND DRAINAGE LOWER EXTREMITY;  Surgeon: Amadeo Turner MD;  Location:  [facility] OR;  Service: Plastics;  Laterality: Right;    INCISION AND DRAINAGE OF WOUND Right 7/8/2021    Procedure: INCISION AND DRAINAGE WOUND RIGHT HIP;  Surgeon: James Huntley MD;  Location:  [facility] OR;  Service: Orthopedics;  Laterality: Right;    JOINT REPLACEMENT      KYPHOPLASTY WITH BIOPSY Bilateral 10/26/2021    Procedure: THOARCIC 12 KYPHOPLASTY WITH BIOPSY;  Surgeon: Bandar Shea MD;  Location:  [facility] OR;  Service: Neurosurgery;  Laterality: Bilateral;    LEG DEBRIDEMENT Right 9/14/2021    Procedure: DEBRIDEMENT OF RIGHT HIP WOUND, RIGHT GLUTEAL FASCIOCUTANEOUS ADVANCEMENT FLAP AND RIGHT TENSOR FASCIAL JESSICA FLAP;  Surgeon: Amadeo Turner MD;  Location:  [facility] OR;  Service: Plastics;  Laterality: Right;    LUMBAR DISCECTOMY Right 3/23/2021    Procedure: LUMBAR DISCECTOMY MICRO, Lumbar 1/2 right;  Surgeon: Bandar Shea MD;  Location:  [facility] OR;  Service: Neurosurgery;  Laterality: Right;    LUMBAR FUSION N/A 1/19/2018    Procedure: L3-4,L4-5 DECOMPRESSION, POSTERIOR SPINAL FUSION WITH INSTRUMENTATION;  Surgeon: Fortino Oropeza MD;  Location:  [facility] OR;  Service:     LUMBAR LAMINECTOMY WITH FUSION Left 1/17/2018    Procedure: LEFT L3-4 L4-5 LATERAL LUMBAR INTERBODY FUSION;  Surgeon: Fortino Oropeza MD;  Location:  [facility] OR;  Service:     MASS EXCISION Right 4/23/2024    Procedure: RIGHT BUTTOCK MASS  EXCISION;  Surgeon: Moises Keyes MD;  Location:  [facility] OR;  Service: General;  Laterality: Right;    MYRINGOTOMY W/ TUBES  09/04/2014    TUBES NO LONGER IN PLACE    OTHER SURGICAL HISTORY      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 so hardware was removed and spacers were placed    REPLACEMENT TOTAL KNEE Right     URETEROSCOPY LASER LITHOTRIPSY WITH STENT INSERTION N/A 6/21/2024    Procedure: URETEROSCOPY LASER LITHOTRIPSY WITH STENT INSERTION LEFT;  Surgeon: Ky Plascencia MD;  Location:  [facility] OR;  Service: Urology;  Laterality: N/A;      General Information       Row Name 06/28/24 0951          OT Time and Intention    Document Type re-evaluation  -LS     Mode of Treatment occupational therapy  -       Row Name 06/28/24 0951          General Information    Patient Profile Reviewed yes  -LS     Existing Precautions/Restrictions fall  -LS       Row Name 06/28/24 0951          Cognition    Orientation Status (Cognition) oriented x 4  -LS       Row Name 06/28/24 0951          Safety Issues, Functional Mobility    Safety Issues Affecting Function (Mobility) safety precaution awareness;safety precautions follow-through/compliance  -     Impairments Affecting Function (Mobility) balance;endurance/activity tolerance;strength;pain;range of motion (ROM)  -               User Key  (r) = Recorded By, (t) = Taken By, (c) = Cosigned By      Initials Name Provider Type     Bianca Mcgarry OTR/L Occupational Therapist                     Mobility/ADL's       Row Name 06/28/24 0951          Bed Mobility    Bed Mobility rolling left;rolling right;supine-sit  -LS     Rolling Left [place] (Bed Mobility) moderate assist (50% patient effort);verbal cues  -LS     Rolling Right [place] (Bed Mobility) moderate assist (50% patient effort);verbal cues  -LS     Supine-Sit [place] (Bed Mobility) moderate assist (50% patient effort);verbal cues;2 person assist  -LS     Sit-Supine [place] (Bed Mobility) maximum assist  (25% patient effort);verbal cues;2 person assist  -LS     Bed Mobility, Safety Issues decreased use of legs for bridging/pushing  -     Assistive Device (Bed Mobility) bed rails;head of bed elevated  -LS       Row Name 06/28/24 0951          Transfers    Transfers sit-stand transfer;stand-sit transfer  -LS       Row Name 06/28/24 0951          Sit-Stand Transfer    Sit-Stand [place] (Transfers) moderate assist (50% patient effort);2 person assist;verbal cues  -LS     Assistive Device (Sit-Stand Transfers) walker, front-wheeled  -LS       Row Name 06/28/24 0951          Stand-Sit Transfer    Stand-Sit [place] (Transfers) moderate assist (50% patient effort);2 person assist;verbal cues  -LS     Assistive Device (Stand-Sit Transfers) walker, front-wheeled  -LS       Row Name 06/28/24 0951          Functional Mobility    Patient was able to Ambulate no, other medical factors prevent ambulation  -LS       Row Name 06/28/24 0951          Activities of Daily Living    BADL Assessment/Intervention upper body dressing;lower body dressing;toileting  -       Row Name 06/28/24 0951          Upper Body Dressing Assessment/Training    [place] Level (Upper Body Dressing) upper body dressing skills;minimum assist (75% patient effort)  -LS     Position (Upper Body Dressing) edge of bed sitting  -LS       Row Name 06/28/24 0951          Lower Body Dressing Assessment/Training    [place] Level (Lower Body Dressing) lower body dressing skills;dependent (less than 25% patient effort)  -LS     Position (Lower Body Dressing) supine  -LS       Row Name 06/28/24 0951          Toileting Assessment/Training    [place] Level (Toileting) toileting skills;dependent (less than 25% patient effort)  -LS     Position (Toileting) supine  -LS               User Key  (r) = Recorded By, (t) = Taken By, (c) = Cosigned By      Initials Name Provider Type    Bianca Saleh OTR/L Occupational Therapist                    Obj/Interventions       [facility] Name 06/28/24 0951          Sensory Assessment (Somatosensory)    Sensory Assessment (Somatosensory) UE sensation intact  -[facility] Name 06/28/24 0951          Vision Assessment/Intervention    Visual Impairment/Limitations WFL;corrective lenses for reading  -[facility] Name 06/28/24 0951          Range of Motion Comprehensive    General Range of Motion bilateral upper extremity ROM WFL  -[facility] Name 06/28/24 0951          Strength Comprehensive (MMT)    General Manual Muscle Testing (MMT) Assessment upper extremity strength deficits identified  -     Comment, General Manual Muscle Testing (MMT) Assessment B shoulders, triceps, and  3+/5; B biceps 4/5  -[facility] Name 06/28/24 0951          Balance    Balance Assessment sitting static balance;sitting dynamic balance;standing static balance  -     Static Sitting Balance standby assist  -     Dynamic Sitting Balance minimal assist  -LS     Position, Sitting Balance sitting edge of bed;supported;unsupported  -     Static Standing Balance moderate assist;2-person assist;verbal cues  -LS     Position/Device Used, Standing Balance supported;walker, front-wheeled  -               User Key  (r) = Recorded By, (t) = Taken By, (c) = Cosigned By      Initials Name Provider Type    LS Bianca Mcgarry, OTR/L Occupational Therapist                   Goals/Plan       [facility] Name 06/28/24 0951          Transfer Goal 1 (OT)    Activity/Assistive Device (Transfer Goal 1, OT) commode, bedside without drop arms  -     [place] Level/Cues Needed (Transfer Goal 1, OT) maximum assist (25-49% patient effort)  -     Time Frame (Transfer Goal 1, OT) long term goal (LTG);10 days  -     Progress/Outcome (Transfer Goal 1, OT) goal revised this date  -[facility] Name 06/28/24 0951          Grooming Goal 1 (OT)    Activity/Device (Grooming Goal 1, OT) grooming skills, all  -LS     [place] (Grooming Goal 1, OT) standby assist   -LS     Time Frame (Grooming Goal 1, OT) long term goal (LTG)  -LS     Strategies/Barriers (Grooming Goal 1, OT) sitting EOB  -LS     Progress/Outcome (Grooming Goal 1, OT) goal ongoing  -LS       Row Name 06/28/24 0951          Strength Goal 1 (OT)    Strength Goal 1 (OT) Pt will be I with BUE HEP strengthening to increase her I with ADLs  -LS     Time Frame (Strength Goal 1, OT) long term goal (LTG);10 days  -LS     Progress/Outcome (Strength Goal 1, OT) goal ongoing  -LS       Row Name 06/28/24 0951          Therapy Assessment/Plan (OT)    Planned Therapy Interventions (OT) activity tolerance training;functional balance retraining;occupation/activity based interventions;ROM/therapeutic exercise;strengthening exercise;transfer/mobility retraining;patient/caregiver education/training;adaptive equipment training;BADL retraining;passive ROM/stretching  -LS               User Key  (r) = Recorded By, (t) = Taken By, (c) = Cosigned By      Initials Name Provider Type    LS Bianca Mcgarry, OTR/L Occupational Therapist                   Clinical Impression       Row Name 06/28/24 0951          Pain Assessment    Pretreatment Pain Rating 5/10  -LS     Posttreatment Pain Rating 5/10  -LS     Pain Location - Side/Orientation Right  -LS     Pain Location lower  -LS     Pain Location - extremity  -LS     Pain Intervention(s) Repositioned;Ambulation/increased activity  -LS       Row Name 06/28/24 0951          Plan of Care Review    Plan of Care Reviewed With patient  -LS     Progress declining  -LS     Outcome Evaluation OT re-eval completed. Pt in fowlers upon therapist arrival; A&Ox4; c/o 5/10 pain in RLE; 2 caregivers also present. Pt performed supine>sit utilizing bedrail with HOB elevated with Mod A x2 and verbal cues for body mechanics and sequencing; sit>supine Max A x2. Pt dependent to abilio B socks. Pt performed sit<>stand x3 attempts requiring Mod A x2 on each attempt. Pt required Mod A x2 to remain standing. Pt  attempted to take steps but was unable to shift any weight onto RLE in order to progress LLE. Pt has had some decline in fxl independence and continues to demo BUE weakness and limited fxl activity tolerance. Continue skilled OT intervention in order to address remaining deficits in fxl mobility, fxl activity tolerance, balance, strength, and use of adaptive techniques/equipment during performance of BADLs. Recommend home with 24/7 care and HH vs SNF pending Pt progress.  -       Row Name 06/28/24 0951          Therapy Assessment/Plan (OT)    Rehab Potential (OT) fair, will monitor progress closely  -LS     Criteria for Skilled Therapeutic Interventions Met (OT) yes;skilled treatment is necessary  -     Therapy Frequency (OT) 5 times/wk  -LS       Row Name 06/28/24 0951          Therapy Plan Review/Discharge Plan (OT)    Anticipated Discharge Disposition (OT) home with 24/7 care;home with home health;skilled nursing facility  -       Row Name 06/28/24 0951          Positioning and Restraints    Pre-Treatment Position in bed  -LS     Post Treatment Position bed  -LS     In Bed fowlers;side rails up x2;call light within reach;encouraged to call for assist;exit alarm on;with family/caregiver;heels elevated  -               User Key  (r) = Recorded By, (t) = Taken By, (c) = Cosigned By      Initials Name Provider Type    LS Bianca Mcgarry, OTR/L Occupational Therapist                   Outcome Measures       Row Name 06/28/24 0951          How much help from another is currently needed...    Putting on and taking off regular lower body clothing? 1  -LS     Bathing (including washing, rinsing, and drying) 2  -LS     Toileting (which includes using toilet bed pan or urinal) 1  -LS     Putting on and taking off regular upper body clothing 3  -LS     Taking care of personal grooming (such as brushing teeth) 3  -LS     Eating meals 3  -LS     AM-PAC 6 Clicks Score (OT) 13  -LS       Row Name 06/28/24 1000          How  much help from another person do you currently need...    Turning from your back to your side while in flat bed without using bedrails? 3  -MR     Moving from lying on back to sitting on the side of a flat bed without bedrails? 3  -MR     Moving to and from a bed to a chair (including a wheelchair)? 2  -MR     Standing up from a chair using your arms (e.g., wheelchair, bedside chair)? 2  -MR     Climbing 3-5 steps with a railing? 1  -MR     To walk in hospital room? 1  -MR     AM-PAC 6 Clicks Score (PT) 12  -MR     Highest Level of Mobility Goal 4 --> Transfer to chair/commode  -MR       Row Name 06/28/24 0951          Functional Assessment    Outcome Measure Options AM-PAC 6 Clicks Daily Activity (OT)  -               User Key  (r) = Recorded By, (t) = Taken By, (c) = Cosigned By      Initials Name Provider Type    Bianca Saleh, OTR/L Occupational Therapist    Skye Mccormack, RN Registered Nurse                    Occupational Therapy Education       Title: PT OT SLP Therapies (Done)       Topic: Occupational Therapy (Done)       Point: ADL training (Done)       Description:   Instruct learner(s) on proper safety adaptation and remediation techniques during self care or transfers.   Instruct in proper use of assistive devices.                  Learning Progress Summary             Patient Acceptance, E, VU,NR by LS at 6/28/2024 1110    Acceptance, E, VU by KS at 6/20/2024 1715    Acceptance, E, VU by EC at 6/20/2024 1558    Acceptance, E, VU by EC at 6/17/2024 1525   Caregiver Acceptance, E, VU by EC at 6/20/2024 1558                         Point: Home exercise program (Done)       Description:   Instruct learner(s) on appropriate technique for monitoring, assisting and/or progressing therapeutic exercises/activities.                  Learning Progress Summary             Patient Acceptance, E, VU by KS at 6/20/2024 1715    Acceptance, E, VU by EC at 6/20/2024 1558   Caregiver Acceptance, E, VU by EC at  6/20/2024 1558                         Point: Precautions (Done)       Description:   Instruct learner(s) on prescribed precautions during self-care and functional transfers.                  Learning Progress Summary             Patient Acceptance, E, VU,NR by LS at 6/28/2024 1110    Acceptance, E, VU by KS at 6/20/2024 1715    Acceptance, E, VU,NR by LS at 6/19/2024 1136    Acceptance, E, VU by EC at 6/17/2024 1525                         Point: Body mechanics (Done)       Description:   Instruct learner(s) on proper positioning and spine alignment during self-care, functional mobility activities and/or exercises.                  Learning Progress Summary             Patient Acceptance, E, VU,NR by LS at 6/28/2024 1110    Acceptance, E, VU by KS at 6/20/2024 1715    Acceptance, E, VU by EC at 6/20/2024 1558    Acceptance, E, VU,NR by LS at 6/19/2024 1136    Acceptance, E, VU by EC at 6/17/2024 1525   Caregiver Acceptance, E, VU by EC at 6/20/2024 1558                                         User Key       Initials Effective Dates Name Provider Type Discipline    KS 02/27/23 -  Ewelina Morales RN Registered Nurse Nurse     06/20/22 -  Bianca Mcgarry, OTR/L Occupational Therapist OT    EC 10/13/23 -  Ashley Martino, OTR/L Occupational Therapist OT                  OT Recommendation and Plan  Planned Therapy Interventions (OT): activity tolerance training, functional balance retraining, occupation/activity based interventions, ROM/therapeutic exercise, strengthening exercise, transfer/mobility retraining, patient/caregiver education/training, adaptive equipment training, BADL retraining, passive ROM/stretching  Therapy Frequency (OT): 5 times/wk  Plan of Care Review  Plan of Care Reviewed With: patient  Progress: declining  Outcome Evaluation: OT re-eval completed. Pt in fowlers upon therapist arrival; A&Ox4; c/o 5/10 pain in RLE; 2 caregivers also present. Pt performed supine>sit utilizing bedrail with HOB  elevated with Mod A x2 and verbal cues for body mechanics and sequencing; sit>supine Max A x2. Pt dependent to abilio B socks. Pt performed sit<>stand x3 attempts requiring Mod A x2 on each attempt. Pt required Mod A x2 to remain standing. Pt attempted to take steps but was unable to shift any weight onto RLE in order to progress LLE. Pt has had some decline in fxl independence and continues to demo BUE weakness and limited fxl activity tolerance. Continue skilled OT intervention in order to address remaining deficits in fxl mobility, fxl activity tolerance, balance, strength, and use of adaptive techniques/equipment during performance of BADLs. Recommend home with 24/7 care and HH vs SNF pending Pt progress.     Time Calculation:         Time Calculation- OT       Row Name 06/28/24 0951             Time Calculation- OT    OT Start Time 0951  +10 minutes chart review  -LS      OT Stop Time 1020  -LS      OT Time Calculation (min) 29 min  -LS      Total Timed Code Minutes- OT 9 minute(s)  -      OT Non-Billable Time (min) 30 min  -      OT Received On 06/28/24  -                User Key  (r) = Recorded By, (t) = Taken By, (c) = Cosigned By      Initials Name Provider Type    LS Bianca Mcgarry OTR/L Occupational Therapist                  Therapy Charges for Today       Code Description Service Date Service Provider Modifiers Qty    54153198531 HC OT RE-EVAL 2 6/28/2024 Bianca Mcgarry OTR/L GO 1    [id number] HC OT SELF CARE/MGMT/TRAIN EA 15 MIN 6/28/2024 Bianca Mcgarry OTR/L GO 1                 Bianca Mcgarry OTR/KARUNA  6/28/2024

## 2024-06-28 NOTE — PROGRESS NOTES
"INFECTIOUS DISEASES PROGRESS NOTE    Patient:  Tawny Shin  YOB: 1955  MRN: [id number]   Admit date: 6/12/2024   Admitting Physician: Kris Cade,*  Primary Care Physician: Moises Oliveira MD    Chief Complaint: \"Not hungry\"      Interval History: Patient notes she is not eating much and her caregiver agrees.  She says she is just \"not hungry\".    Caregiver notes they are trying to get her to drink more.  Ordered strict I's and O's yesterday.  She said they are now having her drink out of a cup so they can monitor that.    Leg pain better    Allergies:   Allergies   Allergen Reactions    Atorvastatin Other (See Comments)     LEG CRAMPS      Amoxicillin Rash    Escitalopram Rash    Nabumetone Rash    Niacin Er Rash    Penicillin G Rash    Penicillins Rash    Simvastatin Rash       Current Scheduled Medications:   ascorbic acid, 500 mg, Oral, Daily  aspirin, 81 mg, Oral, Daily  carvedilol, 25 mg, Oral, BID With Meals  DULoxetine, 60 mg, Oral, Daily  enoxaparin, 1 mg/kg, Subcutaneous, Q12H  estradiol, 2 g, Vaginal, Daily  ferrous sulfate, 325 mg, Oral, Daily With Breakfast  folic acid, 1,000 mcg, Oral, Daily  furosemide, 40 mg, Oral, Daily  isosorbide mononitrate, 60 mg, Oral, Daily  leflunomide, 20 mg, Oral, Daily  levothyroxine, 75 mcg, Oral, Q AM  losartan, 50 mg, Oral, Daily  methenamine, 1 g, Oral, BID  multivitamin with minerals, 1 tablet, Oral, Daily  pantoprazole, 40 mg, Oral, Daily  sodium chloride, 10 mL, Intravenous, Q12H  spironolactone, 25 mg, Oral, Daily      Current PRN Medications:    acetaminophen    senna-docusate sodium **AND** polyethylene glycol **AND** bisacodyl **AND** bisacodyl    Calcium Replacement - Follow Nurse / BPA Driven Protocol    Diclofenac Sodium    Lidocaine    Magnesium Low Dose Replacement - Follow Nurse / BPA Driven Protocol    [DISCONTINUED] Morphine **AND** naloxone    nitroglycerin    ondansetron    Phosphorus Replacement - Follow Nurse / BPA " "Driven Protocol    Potassium Replacement - Follow Nurse / BPA Driven Protocol    sodium chloride    sodium chloride    sodium chloride    traMADol              Objective     Vital Signs:  Temp (24hrs), Av.7 °F (36.5 °C), Min:96.3 °F (35.7 °C), Max:98.4 °F (36.9 °C)      /46 (BP Location: Left arm, Patient Position: Lying)   Pulse 102   Temp 98.4 °F (36.9 °C) (Oral)   Resp 16   Ht 167.6 cm (66\")   Wt 94.8 kg (209 lb)   LMP  (LMP Unknown)   SpO2 91%   BMI 33.73 kg/m²         Physical Exam:    General: The patient is nontoxic-appearing lying in bed no acute distress.  Her caregiver is at bedside  Respiratory: Effort even unlabored, she not conversationally dyspneic  Right lower extremity elevated on pillow.  There is pale erythema and some increased warmth.  She has much less tenderness to palpation today.      Results Review:    I reviewed the patient's new clinical results.    Lab Results:    CBC:   Lab Results   Lab 24  0453 24  0428 24  0437 24  0503 24  0338 24  0556   WBC 6.21 5.03 4.74 4.18 4.39 4.86   HEMOGLOBIN 8.1* 7.7* 7.8* 7.4* 7.2* 8.1*   HEMATOCRIT 27.2* 26.0* 26.1* 24.6* 23.8* 26.3*   PLATELETS 185 170 175 151 150 160        AutoDiff:            Manual Diff:          Invalid input(s): \"MONABS\"          CMP:   Lab Results   Lab 24  0453 24  0428 24  0338 24  0556 24  0404   SODIUM 138   < > 140 136 137   POTASSIUM 4.2   < > 3.5 3.5 3.0*   CHLORIDE 103   < > 105 99 99   CO2 28.0   < > 25.0 25.0 25.0   BUN 16   < > 14 15 14   CREATININE 0.93   < > 1.07* 1.18* 1.22*   CALCIUM 9.1   < > 8.5* 9.1 8.9   BILIRUBIN 0.4  --   --   --   --    ALK PHOS 81  --   --   --   --    ALT (SGPT) 18  --   --   --   --    AST (SGOT) 19  --   --   --   --    GLUCOSE 99   < > 88 80 84    < > = values in this interval not displayed.       Estimated Creatinine Clearance: 49.8 mL/min (A) (by C-G formula based on SCr of 1.22 mg/dL (H)).    Culture " Results:    Microbiology Results (last 10 days)       Procedure Component Value - Date/Time    Urine Culture - Urine, Urine, Catheter [[id number]]  (Normal) Collected: 06/21/24 1116    Lab Status: Final result Specimen: Urine, Catheter Updated: 06/22/24 1152     Urine Culture No growth                 Radiology:         Imaging Results (Last 72 Hours)       Procedure Component Value Units Date/Time    US Ankle / Brachial Indices Extremity Complete [[id number]] Resulted: 06/27/24 1434     Updated: 06/27/24 1454    US Venous Doppler Lower Extremity Right (duplex) [[id number]] Resulted: 06/27/24 1427     Updated: 06/27/24 1454    CT Head Without Contrast [[id number]] Collected: 06/25/24 0934     Updated: 06/25/24 0939    Narrative:      EXAM: CT HEAD WO CONTRAST-      DATE: 6/25/2024 8:19 AM     HISTORY: AMS; N39.0-Urinary tract infection, site not specified;  R41.82-Altered mental status, unspecified; S31.819S-Unspecified open  wound of right buttock, sequela; L72.9-Follicular cyst of the skin and  subcutaneous tissue, unspecified; R13.10-Dysphagia, unspecified;  Z74.09-Other reduced mobility; N13.2-Hydronephrosis with renal and  ureteral calculous obstruction; K81.0-Acute cholecystitis       COMPARISON: 6/12/2024.     DOSE LENGTH PRODUCT: 775.35 mGy.cm  Automated exposure control was also  utilized to decrease patient radiation dose.     TECHNIQUE: Unenhanced CT images obtained from vertex to skull base with  multiplanar reformats.     FINDINGS:  There is no acute intracranial hemorrhage, midline shift, mass effect,  or hydrocephalus. There is no CT evidence for acute infarct.     There are chronic changes with volume loss and chronic small vessel  ischemic change of the periventricular white matter.      SOFT TISSUES: The scalp soft tissues are unremarkable.        SINUS: There is mucosal thickening of the ethmoid air cells and sphenoid  sinus with complete opacification. There is also partial opacification  of the left  mastoid air cells.     ORBITS: The visualized orbits and globes are unremarkable. There is  bilateral lens extraction.          Impression:      1. Chronic changes and no acute intracranial findings.   2. Paranasal sinus mucosal thickening and partial opacification of the  left mastoid air cells.     This report was signed and finalized on 6/25/2024 9:36 AM by Zander Motta.                   Active Hospital Problems    Diagnosis     **Altered mental status     Esophageal dysphagia     UTI (urinary tract infection)     Hydronephrosis with urinary obstruction due to ureteral calculus     Acute cholecystitis     Hypothyroidism     Stage 3b chronic kidney disease     Near functional paraplegia     Cholelithiasis     Essential hypertension     Sick sinus syndrome     Coronary artery disease involving native coronary artery of native heart without angina pectoris     Venous insufficiency     Rheumatoid arthritis involving multiple sites     Chronic heart failure with preserved ejection fraction     Class 1 obesity due to excess calories with serious comorbidity and body mass index (BMI) of 33.0 to 33.9 in adult     Functional neurological symptom disorder with weakness or paralysis     Chronic anticoagulation     Chronic embolism and thrombosis of unspecified deep veins of left lower extremity        IMPRESSION:  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  It was susceptible to cefepime and patient completed a treatment course.  Obstructive left ureteral stone status post cystoscopy and left ureteral stent placement on June 21 Dr. Plascencia.  Stented Horan catheter remain in place.  Right lower extremity edema with some pale erythema.  Patient reports leg pain improved today.  Vascular study pending.  I am unable to locate pulmonary report in media section.  Cholelithiasis with nonvisualization of gallbladder on HIDA-surgery canceled June 25 due to concerns for mental status changes and word finding  difficulty.  Increasing creatinine-creatinine continues to increase.  I ordered strict I's and O's yesterday.  Patient still eating poorly.  Caregivers are trying to remind her to drink.  Right gluteal wound  Deconditioning        RECOMMENDATION:     Increasing creatinine-will order IV fluids.  Added 40 meq of potassium given hypokalemia  Hold Lasix  Ordered one-time dose of potassium 20 mEq  Add magnesium to blood in lab due to hypokalemia  Continue to elevate right lower extremity  Await vascular study report  No plan for reinitiation of antibiotic therapy at this time but certainly if vascular study is negative and there is some progression of right lower extremity erythema, would consider.  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  Continue local wound care to right gluteal wound          Maggie Bang MD  06/28/24  07:55 CDT

## 2024-06-28 NOTE — PLAN OF CARE
Goal Outcome Evaluation:  Plan of Care Reviewed With: patient        Progress: declining  Outcome Evaluation: OT re-eval completed. Pt in fowlers upon therapist arrival; A&Ox4; c/o 5/10 pain in RLE; 2 caregivers also present. Pt performed supine>sit utilizing bedrail with HOB elevated with Mod A x2 and verbal cues for body mechanics and sequencing; sit>supine Max A x2. Pt dependent to abilio B socks. Pt performed sit<>stand x3 attempts requiring Mod A x2 on each attempt. Pt required Mod A x2 to remain standing. Pt attempted to take steps but was unable to shift any weight onto RLE in order to progress LLE. Pt has had some decline in fxl independence and continues to demo BUE weakness and limited fxl activity tolerance. Continue skilled OT intervention in order to address remaining deficits in fxl mobility, fxl activity tolerance, balance, strength, and use of adaptive techniques/equipment during performance of BADLs. Recommend home with 24/7 care and HH vs SNF pending Pt progress.      Anticipated Discharge Disposition (OT): home with 24/7 care, home with home health, skilled nursing facility

## 2024-06-28 NOTE — THERAPY PROGRESS REPORT/RE-CERT
Acute Care - Physical Therapy Progress Note  [facility]     Patient Name: Tawny Shin  : 1955  MRN: [id number]  Today's Date: 2024      Visit Dx:     ICD-10-CM ICD-9-CM   1. Acute UTI (urinary tract infection)  N39.0 599.0   2. Altered mental status, unspecified altered mental status type  R41.82 780.97   3. Open wound of right buttock, sequela  S31.819S 906.0   4. Cyst of buttocks  L72.9 706.2   5. Dysphagia, unspecified type  R13.10 787.20   6. Impaired mobility [Z74.09]  Z74.09 799.89   7. Hydronephrosis with urinary obstruction due to ureteral calculus  N13.2 592.1     591   8. Acute cholecystitis  K81.0 575.0   9. Acute congestive heart failure, unspecified heart failure type  I50.9 428.0     Patient Active Problem List   Diagnosis    T12 compression fracture, initial encounter    Chronic embolism and thrombosis of unspecified deep veins of left lower extremity    Chronic anticoagulation    Iron deficiency anemia    Osteoporosis    E coli bacteremia    Epidural hematoma    Pleural effusion, left    Functional neurological symptom disorder with weakness or paralysis    Class 1 obesity due to excess calories with serious comorbidity and body mass index (BMI) of 33.0 to 33.9 in adult    Rheumatoid arthritis involving multiple sites    Chronic heart failure with preserved ejection fraction    Venous insufficiency    Coronary artery disease involving native coronary artery of native heart without angina pectoris    Sick sinus syndrome    Essential hypertension    Pressure injury of skin of heel    Chronic pain    Anemia of chronic disease    Cholelithiasis    Pressure injury of skin of buttock    Near functional paraplegia    Skin cancer    Squamous cell carcinoma of back    Hematoma    Right-sided chest wall pain    Hyperlipidemia LDL goal <70    Malodorous urine    Stage 3b chronic kidney disease    Cyst of buttocks    Sepsis due to Escherichia coli without acute organ dysfunction    Generalized  weakness    Paralysis    History of urinary retention    Bacteremia due to Enterobacter species    Bacteremia    Hypothyroidism    Abnormal CT of the abdomen    Presence of cardiac pacemaker    Altered mental status    UTI (urinary tract infection)    Esophageal dysphagia    Hydronephrosis with urinary obstruction due to ureteral calculus    Acute cholecystitis     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    Elevated cholesterol     Eustachian tube dysfunction     Heart disease     Herpes simplex     History of transfusion     Hyperlipidemia     Hypertension     Hypothyroidism 12/23/2020    Intrinsic asthma 12/23/2020    Knee dislocation     Labral tear of right hip joint     Laryngitis sicca     Laryngitis, chronic     Left carotid bruit 03/09/2016    MI (myocardial infarction)     Myalgia due to statin 06/25/2019    Open wound of right hip 09/14/2021    Osteomyelitis of right femur 07/06/2021    Otorrhea     Pacemaker 11/17/2016    Primary osteoarthritis of left knee 12/23/2020    Psoriasis vulgaris 12/23/2020    S/P coronary artery stent placement 03/09/2016    Sensorineural hearing loss     Seropositive rheumatoid arthritis of multiple sites 12/27/2019    Overview:  -myochrysine '93-'96 -methotrexate '96--->11/98;r/s  restarted 2/99--> 8/14 (anemia) -sulfasalazine- not effective -penicillamine 6/98-->10/98; no effect -leflunomide 11/98--> - Humira '13-->didn't take - Enbrel 12/14-->3/15- no  "effect!   Last Assessment & Plan:  - \"aching all over\" because she had to be off her anti-rheumatic drugs for 2 weeks in preparation for her R knee surgery - he    Sick sinus syndrome 12/27/2019    Sjogren's disease     Spondylolisthesis of lumbar region 01/17/2018    Syncope, recurrent 02/08/2021    Urinary tract infection     UTI (urinary tract infection) 04/19/2024     Past Surgical History:   Procedure Laterality Date    A-V CARDIAC PACEMAKER INSERTION  2016    ATRIAL CARDIAC PACEMAKER INSERTION      CARDIAC CATHETERIZATION      CATARACT EXTRACTION      CERVICAL CORPECTOMY N/A 3/3/2021    Procedure: CERVICAL 6 CORPECTOMY WITH TITANIUM CAGE WITH NEURO MONITORING;  Surgeon: Bandar Shea MD;  Location:  [facility] OR;  Service: Neurosurgery;  Laterality: N/A;    COLONOSCOPY  11/08/2011    One fold in the ascending colon which showed ulcer otherwise normal exam    COLONOSCOPY  11/12/2004    Normal exam repeat in five years    CORONARY ANGIOPLASTY WITH STENT PLACEMENT      X 2; 2013 & 2014    ENDOSCOPY  07/10/2014    Normal exam    ENDOSCOPY N/A 5/30/2024    Procedure: ESOPHAGOGASTRODUODENOSCOPY WITH ANESTHESIA;  Surgeon: Tawio Sanchez MD;  Location: [facility] ENDOSCOPY;  Service: Gastroenterology;  Laterality: N/A;  preop; dysphagia  postop: balloon dilation  PCP Jesus Manuel jeff    FLAP LEG Right 9/14/2021    Procedure: RIGHT GLUTEAL FASCIOCUTANEOUS ADVANCEMENT FLAP AND RIGHT TENSOR FASCIAL JESSICA FLAP;  Surgeon: Amadeo Turner MD;  Location: [facility] OR;  Service: Plastics;  Laterality: Right;    HIP ABDUCTION TENOTOMY BILATERAL Right 1/14/2021    Procedure: RIGHT HIP GLUTEUS MEDLUS / MINIMUS REPAIR, POSSIBLE ACHILLES ALLOGRAFT;  Surgeon: Nino Carlson MD;  Location: [facility] OR;  Service: Orthopedics;  Laterality: Right;    INCISION AND DRAINAGE ABSCESS Right 6/4/2022    Procedure: INCISION AND DRAINAGE ABSCESS right hip;  Surgeon: Magda Salcido MD;  Location:  [facility] OR;  Service: General;  Laterality: Right; "    INCISION AND DRAINAGE ABSCESS Right 6/10/2022    Procedure: RIGHT HIP INCISION AND DRAINAGE. MD NEEDS 3L VANC IRRIGATION, CURRETTES, DAICANS, KERLEX ROLLS;  Surgeon: Amadeo Turner MD;  Location:  [facility] OR;  Service: Plastics;  Laterality: Right;    INCISION AND DRAINAGE HIP Right 2/9/2021    Procedure: HIP INCISION AND DRAINAGE;  Surgeon: Nino Carlson MD;  Location:  [facility] OR;  Service: Orthopedics;  Laterality: Right;    INCISION AND DRAINAGE LEG Right 10/24/2021    Procedure: INCISION AND DRAINAGE LOWER EXTREMITY;  Surgeon: Amadeo Turner MD;  Location:  [facility];  Service: Plastics;  Laterality: Right;    INCISION AND DRAINAGE OF WOUND Right 7/8/2021    Procedure: INCISION AND DRAINAGE WOUND RIGHT HIP;  Surgeon: James Huntley MD;  Location:  [facility] OR;  Service: Orthopedics;  Laterality: Right;    JOINT REPLACEMENT      KYPHOPLASTY WITH BIOPSY Bilateral 10/26/2021    Procedure: THOARCIC 12 KYPHOPLASTY WITH BIOPSY;  Surgeon: Bandar Shea MD;  Location:  [facility] OR;  Service: Neurosurgery;  Laterality: Bilateral;    LEG DEBRIDEMENT Right 9/14/2021    Procedure: DEBRIDEMENT OF RIGHT HIP WOUND, RIGHT GLUTEAL FASCIOCUTANEOUS ADVANCEMENT FLAP AND RIGHT TENSOR FASCIAL JESSICA FLAP;  Surgeon: Amadeo Turner MD;  Location:  [facility];  Service: Plastics;  Laterality: Right;    LUMBAR DISCECTOMY Right 3/23/2021    Procedure: LUMBAR DISCECTOMY MICRO, Lumbar 1/2 right;  Surgeon: Bandar Shea MD;  Location:  [facility];  Service: Neurosurgery;  Laterality: Right;    LUMBAR FUSION N/A 1/19/2018    Procedure: L3-4,L4-5 DECOMPRESSION, POSTERIOR SPINAL FUSION WITH INSTRUMENTATION;  Surgeon: Fortino Oropeza MD;  Location:  [facility];  Service:     LUMBAR LAMINECTOMY WITH FUSION Left 1/17/2018    Procedure: LEFT L3-4 L4-5 LATERAL LUMBAR INTERBODY FUSION;  Surgeon: Fortino Oropeza MD;  Location:  [facility];  Service:     MASS EXCISION Right 4/23/2024    Procedure: RIGHT BUTTOCK MASS  EXCISION;  Surgeon: Moises Keyes MD;  Location:  [facility] OR;  Service: General;  Laterality: Right;    MYRINGOTOMY W/ TUBES  09/04/2014    TUBES NO LONGER IN PLACE    OTHER SURGICAL HISTORY      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 so hardware was removed and spacers were placed    REPLACEMENT TOTAL KNEE Right     URETEROSCOPY LASER LITHOTRIPSY WITH STENT INSERTION N/A 6/21/2024    Procedure: URETEROSCOPY LASER LITHOTRIPSY WITH STENT INSERTION LEFT;  Surgeon: Ky Plascencia MD;  Location:  [facility] OR;  Service: Urology;  Laterality: N/A;     PT Assessment (Last 12 Hours)       PT Evaluation and Treatment       Row Name 06/28/24 0952          Physical Therapy Time and Intention    Subjective Information complains of;pain;weakness;fatigue  -NW     Document Type therapy note (daily note)  -NW     Mode of Treatment physical therapy  -NW     Patient Effort good  -NW       Row Name 06/28/24 0952          General Information    Existing Precautions/Restrictions fall  -NW       Row Name 06/28/24 0952          Pain    Pretreatment Pain Rating 5/10  -NW     Posttreatment Pain Rating 5/10  -NW     Pain Location - Side/Orientation Right  -NW     Pain Location lower  -NW     Pain Location - extremity  -NW       Row Name 06/28/24 0952          Bed Mobility    Rolling Left [place] (Bed Mobility) moderate assist (50% patient effort)  -NW     Rolling Right [place] (Bed Mobility) moderate assist (50% patient effort)  -NW     Supine-Sit [place] (Bed Mobility) moderate assist (50% patient effort);verbal cues  -NW     Sit-Supine [place] (Bed Mobility) moderate assist (50% patient effort);verbal cues  -NW       Row Name 06/28/24 0952          Sit-Stand Transfer    Sit-Stand [place] (Transfers) verbal cues;moderate assist (50% patient effort);2 person assist  -NW     Assistive Device (Sit-Stand Transfers) walker, front-wheeled  -NW       Row Name 06/28/24 0952          Stand-Sit Transfer    Stand-Sit [place]  (Transfers) verbal cues;2 person assist;moderate assist (50% patient effort)  -NW     Assistive Device (Stand-Sit Transfers) walker, front-wheeled  -NW       Row Name 06/28/24 0952          Gait/Stairs (Locomotion)    Comment, (Gait/Stairs) stood x3 unable to take steps due to weakness, attempt marching in place pt unable  -NW       Row Name 06/28/24 0952          Safety Issues, Functional Mobility    Impairments Affecting Function (Mobility) pain;strength  -NW       Row Name 06/28/24 0952          Motor Skills    Comments, Therapeutic Exercise A/AAROM BLEs sitting EOB  -NW       Row Name             Wound 04/23/24 0936 Right gluteal Incision    Wound - Properties Group Placement Date: 04/23/24  -EP Placement Time: 0936  -EP Present on Original Admission: N  -EP Side: Right  -EP Location: gluteal  -EP Primary Wound Type: Incision  -EP    Retired Wound - Properties Group Placement Date: 04/23/24  -EP Placement Time: 0936  -EP Present on Original Admission: N  -EP Side: Right  -EP Location: gluteal  -EP Primary Wound Type: Incision  -EP    Retired Wound - Properties Group Date first assessed: 04/23/24  -EP Time first assessed: 0936  -EP Present on Original Admission: N  -EP Side: Right  -EP Location: gluteal  -EP Primary Wound Type: Incision  -EP      Row Name 06/28/24 0952          Positioning and Restraints    Pre-Treatment Position in bed  -NW     Post Treatment Position bed  -NW     In Bed fowlers;call light within reach;encouraged to call for assist;with family/caregiver  -NW               User Key  (r) = Recorded By, (t) = Taken By, (c) = Cosigned By      Initials Name Provider Type    Angle Richter PTA Physical Therapist Assistant    Sonny Thompson, RN Registered Nurse                    Physical Therapy Education       Title: PT OT SLP Therapies (Done)       Topic: Physical Therapy (Done)       Point: Mobility training (Done)       Learning Progress Summary             Patient Acceptance, E, VU by KS  at 6/20/2024 1715    Acceptance, E, VU by MS at 6/18/2024 1502    Comment: role of PT in her care                         Point: Home exercise program (Done)       Learning Progress Summary             Patient Acceptance, E, VU by KS at 6/20/2024 1715    Acceptance, E,D,TB, VU,DU,NR by  at 6/15/2024 1514    Acceptance, E,TB,D, VU,DU,NR by  at 6/14/2024 1550                         Point: Body mechanics (Done)       Learning Progress Summary             Patient Acceptance, E, VU by KS at 6/20/2024 1715    Acceptance, E,D,TB, VU,DU,NR by  at 6/15/2024 1514    Acceptance, E,TB,D, VU,DU,NR by  at 6/14/2024 1550                         Point: Precautions (Done)       Learning Progress Summary             Patient Acceptance, E, VU by KS at 6/20/2024 1715    Acceptance, E,D,TB, VU,DU,NR by  at 6/15/2024 1514    Acceptance, E,TB,D, VU,DU,NR by  at 6/14/2024 1550                                         User Key       Initials Effective Dates Name Provider Type Discipline    MS 07/11/23 -  Nicole Olson, PT, DPT, NCS Physical Therapist PT    KS 02/27/23 -  Ewelina Morales, RN Registered Nurse Nurse     10/17/23 -  Stephanie Doll RN Registered Nurse Nurse                   06/28/24 1100   Bed Mobility Goal 1 (PT)   Activity/Assistive Device (Bed Mobility Goal 1, PT) bed mobility activities, all   [place] Level/Cues Needed (Bed Mobility Goal 1, PT) modified independence   Time Frame (Bed Mobility Goal 1, PT) long term goal (LTG);by discharge   Progress/Outcomes (Bed Mobility Goal 1, PT) good progress toward goal;goal ongoing   Transfer Goal 1 (PT)   Activity/Assistive Device (Transfer Goal 1, PT) sit-to-stand/stand-to-sit;bed-to-chair/chair-to-bed;walker, rolling   [place] Level/Cues Needed (Transfer Goal 1, PT) minimum assist (75% or more patient effort)   Time Frame (Transfer Goal 1, PT) long term goal (LTG);by discharge   Progress/Outcome (Transfer Goal 1, PT) good progress toward goal;goal  ongoing   Gait Training Goal 1 (PT)   Activity/Assistive Device (Gait Training Goal 1, PT) gait (walking locomotion);assistive device use;decrease fall risk;increase endurance/gait distance;improve balance and speed;walker, rolling   [place] Level (Gait Training Goal 1, PT) minimum assist (75% or more patient effort)   Distance (Gait Training Goal 1, PT) 10 ft   Time Frame (Gait Training Goal 1, PT) long term goal (LTG);by discharge   Progress/Outcome (Gait Training Goal 1, PT) progress slower than expected;goal ongoing     PT Recommendation and Plan     Plan of Care Reviewed With: patient  Progress: no change  Outcome Evaluation: Bed mobility mod x2. Sat EOB working on sit-stands x3 mod x2, pt unable to take steps due to weakness, attempt marching in place pt unable as well. Tolerated A-AAROM BLEs sitting EOB. Pt will need cont therapy  upon d/c for strength, mobility and safety   Outcome Measures       Row Name 06/27/24 1400 06/26/24 1400 06/26/24 1300       How much help from another person do you currently need...    Turning from your back to your side while in flat bed without using bedrails? 2  -KJ 2  -KJ --    Moving from lying on back to sitting on the side of a flat bed without bedrails? 2  -KJ 2  -KJ --    Moving to and from a bed to a chair (including a wheelchair)? 1  -KJ 2  -KJ --    Standing up from a chair using your arms (e.g., wheelchair, bedside chair)? 1  -KJ 1  -KJ --    Climbing 3-5 steps with a railing? 1  -KJ 1  -KJ --    To walk in hospital room? 1  -KJ 1  -KJ --    AM-PAC 6 Clicks Score (PT) 8  -KJ 9  -KJ --    Highest Level of Mobility Goal 3 --> Sit at edge of bed  -KJ 3 --> Sit at edge of bed  -KJ --       How much help from another is currently needed...    Putting on and taking off regular lower body clothing? -- -- 2  -TS    Bathing (including washing, rinsing, and drying) -- -- 2  -TS    Toileting (which includes using toilet bed pan or urinal) -- -- 2  -TS    Putting on and  taking off regular upper body clothing -- -- 2  -TS    Taking care of personal grooming (such as brushing teeth) -- -- 3  -TS    Eating meals -- -- 3  -TS    AM-PAC 6 Clicks Score (OT) -- -- 14  -TS       Functional Assessment    Outcome Measure Options AM-PAC 6 Clicks Basic Mobility (PT)  -KJ AM-[facility] 6 Clicks Basic Mobility (PT)  -KJ --      Row Name 06/25/24 1400             How much help from another person do you currently need...    Turning from your back to your side while in flat bed without using bedrails? 2  -KJ      Moving from lying on back to sitting on the side of a flat bed without bedrails? 2  -KJ      Moving to and from a bed to a chair (including a wheelchair)? 2  -KJ      Standing up from a chair using your arms (e.g., wheelchair, bedside chair)? 2  -KJ      Climbing 3-5 steps with a railing? 1  -KJ      To walk in hospital room? 1  -KJ      AM-[facility] 6 Clicks Score (PT) 10  -KJ      Highest Level of Mobility Goal 4 --> Transfer to chair/commode  -KJ         Functional Assessment    Outcome Measure Options AM-[facility] 6 Clicks Basic Mobility (PT)  -KJ                User Key  (r) = Recorded By, (t) = Taken By, (c) = Cosigned By      Initials Name Provider Type    Emilee Gloria, SERENA Physical Therapist Assistant    Carolee Roger COTA Occupational Therapist Assistant                     Time Calculation:    PT Charges       Row Name 06/28/24 1022             Time Calculation    Start Time 0952  -NW      Stop Time 1023  -NW      Time Calculation (min) 31 min  -NW      PT Received On 06/28/24  -NW      PT Goal Re-Cert Due Date 06/28/24  -NW         Time Calculation- PT    Total Timed Code Minutes- PT 31 minute(s)  -NW         Timed Charges    56879 - PT Therapeutic Activity Minutes 31  -NW         Total Minutes    Timed Charges Total Minutes 31  -NW       Total Minutes 31  -NW                User Key  (r) = Recorded By, (t) = Taken By, (c) = Cosigned By      Initials Name Provider Type    NW  Angle Nagel PTA Physical Therapist Assistant                  Therapy Charges for Today       Code Description Service Date Service Provider Modifiers Qty    [id number]  PT THERAPEUTIC ACT EA 15 MIN 6/28/2024 Angle Nagel PTA GP 2            PT G-Codes  Outcome Measure Options: AM-PAC 6 Clicks Basic Mobility (PT)  AM-PAC 6 Clicks Score (PT): 8  AM-PAC 6 Clicks Score (OT): 14    Angle Nagel PTA  6/28/2024      The patient continues to slowly make progress toward goals. PT will continue with current plan of care.   Nicole Olson, PT, DPT, NCS 6/28/2024 11:28 CDT

## 2024-06-29 PROBLEM — M10.9 ACUTE GOUT: Status: ACTIVE | Noted: 2024-06-29

## 2024-06-29 LAB
ALBUMIN SERPL-MCNC: 2.7 G/DL (ref 3.5–5.2)
ALP SERPL-CCNC: 86 U/L (ref 39–117)
ALT SERPL W P-5'-P-CCNC: 21 U/L (ref 1–33)
ANION GAP SERPL CALCULATED.3IONS-SCNC: 8 MMOL/L (ref 5–15)
AST SERPL-CCNC: 25 U/L (ref 1–32)
BILIRUB CONJ SERPL-MCNC: 0.3 MG/DL (ref 0–0.3)
BILIRUB INDIRECT SERPL-MCNC: -0.1 MG/DL
BILIRUB SERPL-MCNC: 0.2 MG/DL (ref 0–1.2)
BUN SERPL-MCNC: 13 MG/DL (ref 8–23)
BUN/CREAT SERPL: 11.8 (ref 7–25)
CALCIUM SPEC-SCNC: 8.7 MG/DL (ref 8.6–10.5)
CHLORIDE SERPL-SCNC: 103 MMOL/L (ref 98–107)
CHROMATIN AB SERPL-ACNC: 11.4 IU/ML (ref 0–14)
CO2 SERPL-SCNC: 27 MMOL/L (ref 22–29)
CREAT SERPL-MCNC: 1.1 MG/DL (ref 0.57–1)
CRP SERPL-MCNC: 4.04 MG/DL (ref 0–0.5)
DEPRECATED RDW RBC AUTO: 62.4 FL (ref 37–54)
EGFRCR SERPLBLD CKD-EPI 2021: 54.2 ML/MIN/1.73
ERYTHROCYTE [DISTWIDTH] IN BLOOD BY AUTOMATED COUNT: 18.6 % (ref 12.3–15.4)
GLUCOSE SERPL-MCNC: 92 MG/DL (ref 65–99)
HCT VFR BLD AUTO: 27.2 % (ref 34–46.6)
HGB BLD-MCNC: 8.1 G/DL (ref 12–15.9)
MAGNESIUM SERPL-MCNC: 2.2 MG/DL (ref 1.6–2.4)
MCH RBC QN AUTO: 28.1 PG (ref 26.6–33)
MCHC RBC AUTO-ENTMCNC: 29.8 G/DL (ref 31.5–35.7)
MCV RBC AUTO: 94.4 FL (ref 79–97)
PHOSPHATE SERPL-MCNC: 2.7 MG/DL (ref 2.5–4.5)
PLATELET # BLD AUTO: 151 10*3/MM3 (ref 140–450)
PMV BLD AUTO: 10.5 FL (ref 6–12)
POTASSIUM SERPL-SCNC: 3.8 MMOL/L (ref 3.5–5.2)
PROT SERPL-MCNC: 6.3 G/DL (ref 6–8.5)
RBC # BLD AUTO: 2.88 10*6/MM3 (ref 3.77–5.28)
SODIUM SERPL-SCNC: 138 MMOL/L (ref 136–145)
URATE SERPL-MCNC: 6.2 MG/DL (ref 2.4–5.7)
WBC NRBC COR # BLD AUTO: 4.53 10*3/MM3 (ref 3.4–10.8)

## 2024-06-29 PROCEDURE — 97110 THERAPEUTIC EXERCISES: CPT

## 2024-06-29 PROCEDURE — 86140 C-REACTIVE PROTEIN: CPT | Performed by: FAMILY MEDICINE

## 2024-06-29 PROCEDURE — 99232 SBSQ HOSP IP/OBS MODERATE 35: CPT | Performed by: UROLOGY

## 2024-06-29 PROCEDURE — 97530 THERAPEUTIC ACTIVITIES: CPT

## 2024-06-29 PROCEDURE — 25010000002 ENOXAPARIN PER 10 MG: Performed by: FAMILY MEDICINE

## 2024-06-29 PROCEDURE — 80076 HEPATIC FUNCTION PANEL: CPT | Performed by: FAMILY MEDICINE

## 2024-06-29 PROCEDURE — 25010000002 SODIUM CHLORIDE 0.9 % WITH KCL 40 MEQ/L 40-0.9 MEQ/L-% SOLUTION: Performed by: INTERNAL MEDICINE

## 2024-06-29 PROCEDURE — 84100 ASSAY OF PHOSPHORUS: CPT | Performed by: FAMILY MEDICINE

## 2024-06-29 PROCEDURE — 99232 SBSQ HOSP IP/OBS MODERATE 35: CPT | Performed by: INTERNAL MEDICINE

## 2024-06-29 PROCEDURE — 85027 COMPLETE CBC AUTOMATED: CPT | Performed by: UROLOGY

## 2024-06-29 PROCEDURE — 83735 ASSAY OF MAGNESIUM: CPT | Performed by: FAMILY MEDICINE

## 2024-06-29 PROCEDURE — 84550 ASSAY OF BLOOD/URIC ACID: CPT | Performed by: FAMILY MEDICINE

## 2024-06-29 PROCEDURE — 80048 BASIC METABOLIC PNL TOTAL CA: CPT | Performed by: FAMILY MEDICINE

## 2024-06-29 PROCEDURE — 86431 RHEUMATOID FACTOR QUANT: CPT | Performed by: FAMILY MEDICINE

## 2024-06-29 RX ORDER — PREDNISONE 20 MG/1
20 TABLET ORAL
Status: DISCONTINUED | OUTPATIENT
Start: 2024-06-29 | End: 2024-07-05

## 2024-06-29 RX ORDER — COLCHICINE 0.6 MG/1
0.6 TABLET ORAL DAILY
Status: DISCONTINUED | OUTPATIENT
Start: 2024-06-29 | End: 2024-07-08 | Stop reason: HOSPADM

## 2024-06-29 RX ADMIN — METHENAMINE HIPPURATE 1 G: 1000 TABLET ORAL at 11:43

## 2024-06-29 RX ADMIN — DICLOFENAC SODIUM 4 G: 10 GEL TOPICAL at 11:42

## 2024-06-29 RX ADMIN — LEFLUNOMIDE 20 MG: 20 TABLET ORAL at 11:42

## 2024-06-29 RX ADMIN — LEVOTHYROXINE SODIUM 75 MCG: 0.07 TABLET ORAL at 05:40

## 2024-06-29 RX ADMIN — ACETAMINOPHEN 650 MG: 325 TABLET, FILM COATED ORAL at 20:47

## 2024-06-29 RX ADMIN — DOCUSATE SODIUM AND SENNOSIDES 2 TABLET: 8.6; 5 TABLET, FILM COATED ORAL at 20:45

## 2024-06-29 RX ADMIN — TRAMADOL HYDROCHLORIDE 50 MG: 50 TABLET ORAL at 18:52

## 2024-06-29 RX ADMIN — ISOSORBIDE MONONITRATE 60 MG: 60 TABLET, EXTENDED RELEASE ORAL at 11:42

## 2024-06-29 RX ADMIN — COLCHICINE 0.6 MG: 0.6 TABLET ORAL at 20:45

## 2024-06-29 RX ADMIN — Medication 10 ML: at 20:49

## 2024-06-29 RX ADMIN — TRAMADOL HYDROCHLORIDE 50 MG: 50 TABLET ORAL at 12:51

## 2024-06-29 RX ADMIN — TRAMADOL HYDROCHLORIDE 50 MG: 50 TABLET ORAL at 05:40

## 2024-06-29 RX ADMIN — POTASSIUM CHLORIDE AND SODIUM CHLORIDE 100 ML/HR: 900; 300 INJECTION, SOLUTION INTRAVENOUS at 05:20

## 2024-06-29 RX ADMIN — ENOXAPARIN SODIUM 90 MG: 100 INJECTION SUBCUTANEOUS at 17:35

## 2024-06-29 RX ADMIN — ENOXAPARIN SODIUM 90 MG: 100 INJECTION SUBCUTANEOUS at 05:40

## 2024-06-29 RX ADMIN — Medication 10 ML: at 11:49

## 2024-06-29 RX ADMIN — DULOXETINE HYDROCHLORIDE 60 MG: 30 CAPSULE, DELAYED RELEASE ORAL at 11:42

## 2024-06-29 RX ADMIN — METHENAMINE HIPPURATE 1 G: 1000 TABLET ORAL at 20:45

## 2024-06-29 RX ADMIN — ASPIRIN 81 MG: 81 TABLET, COATED ORAL at 11:42

## 2024-06-29 NOTE — PLAN OF CARE
Goal Outcome Evaluation:   VSS. A&Ox4. Disoriented at times. Room air. Complaints of pain to RLE. See MAR for intervention. Turns per patient request. Horan catheter and ureter stent removed bedside by provider. Patient voiding. Will continue to monitor.

## 2024-06-29 NOTE — PROGRESS NOTES
[facility]  INPATIENT PROGRESS NOTE    Patient Name: Tawny Shin  Date of Admission: 6/12/2024  Today's Date: 06/29/24  Length of Stay: 8  Primary Care Physician: Moises Oliveira MD    Subjective   Chief Complaint: Recurrent UTI, gallstone, kidney stone   HPI   She still having right lower extremity pain and some edema.  Fortunately Doppler venous studies and NISREEN are normal.  Increased frequency of tramadol for pain with partial control.  Will added uric acid, CRP, rheumatoid factor levels to blood work.  Check LFTs.    Also discussed about problem with Dr. Keyes, he will visit in the morning.  He recommends that the patient will not recover without surgery.    6/27 Complains of right lower extremity pain from the hip to the foot.    No skin discoloration.  Wound on the right buttock appears clean and dry.  Eliquis remains on hold for possible cholecystectomy.    Review of Systems    Negative for chills and fever.   HENT:  Negative for hearing loss, nosebleeds, tinnitus and trouble swallowing.    Eyes:  Negative for visual disturbance.   Respiratory:  Negative for cough, chest tightness, shortness of breath and wheezing.    Cardiovascular:  Negative for chest pain, palpitations and leg swelling.   Gastrointestinal:  Negative for abdominal distention, abdominal pain, blood in stool, constipation, diarrhea, nausea and vomiting.   Endocrine: Negative for cold intolerance, heat intolerance, polydipsia, polyphagia and polyuria.   Genitourinary:  Negative for decreased urine volume, difficulty urinating, dysuria, flank pain, frequency and hematuria.   Musculoskeletal:  Positive for arthralgias, gait problem and myalgias. Negative for joint swelling.   Skin:  Negative for rash.   Allergic/Immunologic: Negative for immunocompromised state.   Neurological:  Positive for weakness. Negative for dizziness, syncope, light-headedness and headaches.   Hematological:   Negative for adenopathy. Does not bruise/bleed easily.   All pertinent negatives and positives are as above. All other systems have been reviewed and are negative unless otherwise stated.     Objective    Temp:  [97.6 °F (36.4 °C)-98.9 °F (37.2 °C)] 98.1 °F (36.7 °C)  Heart Rate:  [78-88] 88  Resp:  [16-18] 16  BP: (121-150)/(46-63) 121/46  Physical Exam  Vitals and nursing note reviewed.   Constitutional:       Comments: Chronically ill.  Deconditioning.   HENT:      Head: Normocephalic.   Eyes:      Conjunctiva/sclera: Conjunctivae normal.      Pupils: Pupils are equal, round, and reactive to light.   Cardiovascular:      Rate and Rhythm: Normal rate and regular rhythm.      Heart sounds: Normal heart sounds.   Pulmonary:      Effort: Pulmonary effort is normal. No respiratory distress.      Breath sounds: Normal breath sounds.   Abdominal:      General: Bowel sounds are normal. There is no distension.      Palpations: Abdomen is soft.      Comments: Obesity.  No tenderness noted with palpation.   Musculoskeletal:         General: No swelling.      Cervical back: Neck supple.   Skin:     General: Skin is warm and dry.      Capillary Refill: Capillary refill takes 2 to 3 seconds.      Findings: No rash.   Neurological:      Mental Status: She is alert and oriented to person, place, and time.      Motor: Weakness present.      Coordination: Coordination abnormal.      Gait: Gait abnormal.   Psychiatric:         Mood and Affect: Mood normal.         Behavior: Behavior normal.         Thought Content: Thought content normal.          Results Review:  I have reviewed the labs, radiology results, and diagnostic studies.    Laboratory Data:   Results from last 7 days   Lab Units 06/29/24  0447 06/27/24  0556 06/26/24  0338   WBC 10*3/mm3 4.53 4.86 4.39   HEMOGLOBIN g/dL 8.1* 8.1* 7.2*   HEMATOCRIT % 27.2* 26.3* 23.8*   PLATELETS 10*3/mm3 151 160 150        Results from last 7 days   Lab Units 06/29/24  0447  06/28/24  0404 06/27/24  0556   SODIUM mmol/L 138 137 136   POTASSIUM mmol/L 3.8 3.0* 3.5   CHLORIDE mmol/L 103 99 99   CO2 mmol/L 27.0 25.0 25.0   BUN mg/dL 13 14 15   CREATININE mg/dL 1.10* 1.22* 1.18*   CALCIUM mg/dL 8.7 8.9 9.1   GLUCOSE mg/dL 92 84 80       Culture Data:   Urine Culture   Date Value Ref Range Status   06/21/2024 No growth  Final   06/16/2024 25,000 CFU/mL Enterobacter cloacae complex CRE (A)  Final     Comment:       Consider infectious disease consult.  Susceptibility results may not correlate to clinical outcomes.  Carbapenem resistant Enterobacteriaceae, patient may be an isolation risk.  No carbapenemase detected.       Radiology Data:   Imaging Results (Last 24 Hours)       Procedure Component Value Units Date/Time    US Venous Doppler Lower Extremity Right (duplex) [[id number]] Collected: 06/28/24 1432     Updated: 06/28/24 1435    Narrative:      History: Pain and swelling       Impression:      Impression: There is no evidence of deep venous thrombosis or  superficial thrombophlebitis of the right lower extremity.     Comments: Right lower extremity venous duplex exam was performed using  color Doppler flow, Doppler wave form analysis, and grayscale imaging,  with and without compression. There is no evidence of deep venous  thrombosis of the common femoral, superficial femoral, popliteal,  posterior tibial, and peroneal veins. There is no thrombus identified in  the saphenofemoral junction or the greater saphenous vein.         This report was signed and finalized on 6/28/2024 2:32 PM by Dr. Kannan Mcgraw MD.       US Ankle / Brachial Indices Extremity Complete [[id number]] Collected: 06/28/24 1354     Updated: 06/28/24 1358    Narrative:         History: PAD     Comments: Bilateral lower extremity arterial with multi-level pulse  volume recordings and segmental pressures were performed at rest and  stress.     The right ankle/brachial index is 1.45. The waveforms are  biphasic  without dampening. These findings are consistent with no significant  arterial insufficiency of the right lower extremity at rest.     The left ankle/brachial index is not obtainable due to  noncompressibility of the vessels. The waveforms are biphasic without  dampening.        Impression:      Impression:  1. No significant arterial insufficiency of the right lower extremity at  rest.  2. The left ankle/brachial index was not obtainable due to  noncompressibility of the vessels.         This report was signed and finalized on 6/28/2024 1:55 PM by Dr. Kannan Mcgraw MD.               I have reviewed the patient's current medications.     Assessment/Plan   Assessment  Active Hospital Problems    Diagnosis     **Altered mental status     Esophageal dysphagia     UTI (urinary tract infection)     Hydronephrosis with urinary obstruction due to ureteral calculus     Acute cholecystitis     Hypothyroidism     Stage 3b chronic kidney disease     Near functional paraplegia     Cholelithiasis     Essential hypertension     Sick sinus syndrome     Coronary artery disease involving native coronary artery of native heart without angina pectoris     Venous insufficiency     Rheumatoid arthritis involving multiple sites     Chronic heart failure with preserved ejection fraction     Class 1 obesity due to excess calories with serious comorbidity and body mass index (BMI) of 33.0 to 33.9 in adult     Functional neurological symptom disorder with weakness or paralysis     Chronic anticoagulation     Chronic embolism and thrombosis of unspecified deep veins of left lower extremity        Treatment Plan  Recurrent UTI/neurogenic bladder.  Cefepime/Flagyl antibiotics.  Continue HipRex.  Repeated UA, negative for bacteria.  Consult ID.  Urology consulted and following.   CT scan abdomen pelvic . Obstructive uropathy on the left with mild to moderate dilatation of the upper tracts of the left kidney and left ureter into the  true pelvis where there is a 5 x 6 mm calculus within the left ureter approximately 4 to 5 cm above the UVJ- right ureter is decompressed and normal in appearance- No evidence of renal mass. No perinephric fluid collection present, gallbladder is mildly distended- gallstones within the gallbladder neck,  No pericholecystic inflammatory changes or biliary dilatation, Constipation,  No obstruction or free air, Normal appendix, Calcified fibroid within the lower uterine segment, Mild constipation, No obstruction or free air, Normal appendix, Small fat-containing periumbilical hernia, Previous kyphoplasty at T12, Previous fusion at L3-L4 and L4-L5, Left basilar atelectasis..  Recommendation from urology-evaluation of neurogenic bladder at a university setting like [employer].     Obstructing renal calculi left side/moderate dilated of left ureter.  Callus 5 x 6 mm and left 4 to 5 cm above the UVJ junction.  Status post 6/21/2024 urethroscope laser lithotripsy with stent insertion left. Horan in place.     Gallstone, within the gallbladder neck/dysfunctional gallbladder.  Mild distended gallbladder.  Right upper quadrant tenderness.  Ultrasound of the gallbladder-Cholelithiasis with small shadowing stones and sludge within the region of the gallbladder neck- Moderate distention of the gallbladder- however no gallbladder wall thickening or pericholecystic fluid identified- No biliary dilatation.  HIDA scan-Nonvisualization of the gallbladder on images obtained up to 90 minutes- Cystic duct obstruction and acute cholecystitis cannot be excluded on the basis of this exam,  No evidence of common bile duct obstruction with emptying of activity into the C-loop of the duodenum and proximal jejunum, Gallstones and sludge were demonstrated on recent ultrasound- combined with nonvisualization of the gallbladder exclude performance of an ejection fraction..  Discussed with general surgery Dr. Jo-plan for gallbladder removal  this coming Tuesday, DC Eliquis today 6/22/2024 start Lovenox today, nursing communication to stop Lovenox 24 hours before surgery on Monday.  Dr. Jo also recommended cardiac clearance for surgery. Cardiology states okay to DC Lovenox today.    Cholecystectomy delay due to waxing and waning encephalopathy and concern for further cognitive decline.  General surgery considering referral for interventional radiologist to percutaneous drain.     Reflux/esophageal dysphagia. Dysphagia recent esophageal stretching, GI evaluated and recommended to continue outpatient follow-up.  SLP evaluated and recommended to continue regular diet.  Protonix . Zofran as needed.     Altered mental status/encephalopathy.  Consult neurology  She does not seem to receive anything overnight that could cause worsening mental status changes.  Metronidazole has an incidence of encephalopathy but average is a 28 days and worsening patients with liver disease which makes shortness seem to have at this point.  CT scan the head-No acute intracranial abnormality, Chronic sinusitis and left mastoid effusion.  Repeat this morning.  Medications reviewed  Chest x-ray-Stable chest exam without acute process, No visualized infiltrate.   Patient is on room air.    Right lower extremity pain uric acid and CRP are elevated.  Rheumatoid factor quantitative normal.  Consistent with gout, will start colchicine at lower level and add prednisone.  Lovenox full dose.  Continue to hold Eliquis  Doppler right lower extremity and NISREEN normal.  Pain control.     Hypothyroidism.  Synthroid.     Chronic stage IIIb renal failure.  Creatinine normalized.     Hypomagnesia. Resolved.     Hyponatremia.  Resolved.     Hypokalemia.  Resolved.  Magnesium normal.     Hypertension/sick sinus syndrome/venous insufficiency/CHF.  Eliquis.  Aspirin . Coreg . Lasix.  Imdur.  Cozaar . Aldactone.  Nitro as needed.     Right gluteal wound.  Consult wound care.     Rheumatoid arthritis.  Arava.     Depression/anxiety.  Cymbalta.     Postmenopausal.  Estradiol cream.     Anemia.  Hemoglobin decreased.  Iron sulfate.  Folic acid.  No sign of acute bleed.     Pain . lidocaine patch.  Voltaren gel.  Ultram as needed.     Chronic DVT.  DC Eliquis due to possible surgery, on Lovenox.    Functional neurological symptom disorder with paraplegia.     Nutrition.  Cardiac diet.  Vitamin C.  Multivitamins.  Regular/house diet.  Boost supplement.     Deconditioning.  PT and OT consult.  Patient use a walker and bedbound at baseline.     Urine culture-100,000 CFU/mL Enterobacter cloacae complex  25,000 CFU/mL Enterobacter cloacae complex CRE   Blood culture-no growth for 5 days.     Dr. Shin's mom.  Patient already have home health at home.  Patient request for lift at discharge.     Medical Decision Making  Number and Complexity of problems: Recurrent UTI/altered mental status/reflux/rheumatoid arthritis/chronic 3B renal failure  Differential Diagnosis: None     Conditions and Status        Condition is unchanged.     MDM Data  External documents reviewed: Previous note .  Cardiac tracing (EKG, telemetry) interpretation: Sinus.  Radiology interpretation: X-ray/CT scan of the head  Labs reviewed: Laboratory  Any tests that were considered but not ordered: Lab in a.m.     Decision rules/scores evaluated (example [id number], Wells, etc): None     Discussed with: Patient and family     Care Planning  Shared decision making: Patient and family  Code status and discussions: DNR     Disposition  Social Determinants of Health that impact treatment or disposition: From home  1 to 3 days.    Electronically signed by Kris Cade MD, 06/29/24, 10:32 CDT.

## 2024-06-29 NOTE — PLAN OF CARE
Admitted 6/13 for uti, hypoxia  Uro following - lithotripsy w/ stent placement on 6/22  ID following - not on abx @ this time  Gen Surg - poss cholecystectomy   Neuro - aphasia (resolved)  Lovenox  90 mg sq bid dvt prophylaxis; poss cholecystectomy on Tue (7/2)  Tramadol 50 mg q 6 hr prn for pain    Problem: Adult Inpatient Plan of Care  Goal: Plan of Care Review  Outcome: Ongoing, Progressing  Flowsheets (Taken 6/29/2024 0348)  Plan of Care Reviewed With: patient     Problem: Fall Injury Risk  Goal: Absence of Fall and Fall-Related Injury  Outcome: Ongoing, Progressing  Intervention: Promote Injury-Free Environment  Recent Flowsheet Documentation  Taken 6/29/2024 0300 by Manan Atkinson RN  Safety Promotion/Fall Prevention: safety round/check completed  Taken 6/29/2024 0100 by Manan Atkinson RN  Safety Promotion/Fall Prevention: safety round/check completed  Taken 6/29/2024 0000 by Manan Atkinson RN  Safety Promotion/Fall Prevention: safety round/check completed  Taken 6/28/2024 2300 by Manan Atkinson RN  Safety Promotion/Fall Prevention: safety round/check completed  Taken 6/28/2024 2202 by Manan Atkinson RN  Safety Promotion/Fall Prevention: safety round/check completed  Taken 6/28/2024 2104 by Manan Atkinson RN  Safety Promotion/Fall Prevention: safety round/check completed  Taken 6/28/2024 2006 by Manan Atkinson RN  Safety Promotion/Fall Prevention: safety round/check completed  Taken 6/28/2024 1937 by Manan Atkinson RN  Safety Promotion/Fall Prevention: safety round/check completed   Goal Outcome Evaluation:  Plan of Care Reviewed With: patient

## 2024-06-29 NOTE — PROGRESS NOTES
LOS: 8 days   Patient Care Team:  Moises Oliveira MD as PCP - General (Internal Medicine)  Moises Holman MD as Consulting Physician (Otolaryngology)  Christiano Rao PA as Physician Assistant (Otolaryngology)  Candelaria David MD as Obstetrician (Obstetrics and Gynecology)  Omar Hernandez MD as Cardiologist (Cardiology)  Leta Crawford APRN as Nurse Practitioner (Nurse Practitioner)  Leta Crawford APRN as Nurse Practitioner (Nurse Practitioner)    Chief Complaint:  UTI, stone, PINKY    Subjective     Interval History:     Feels improvement daily.    Long discussion re: stone, stent, kay and bladder management.    We will remove stent and kay today and encourage spontaneous voiding with prompted voids, assistance to transition to commode, and care to clean up any incontinence for now. We discussed that a kay may be replaced if incontinence is too overwhelming or transition to commode is too difficult but we would prefer if we can create a path to success regarding spontaneous voids with medical treatment of incontinence if warranted.     Review of Systems  Pertinent items are noted in HPI     Objective     Vital Signs  Temp:  [97.6 °F (36.4 °C)-98.9 °F (37.2 °C)] 98.1 °F (36.7 °C)  Heart Rate:  [78-88] 88  Resp:  [16-18] 16  BP: (121-150)/(46-63) 121/46    Physical Exam:  Kay and stent removed.     Data Review:       I have reviewed the following data:    Basic Metabolic Panel (06/29/2024 04:47)     CBC (No Diff) (06/29/2024 04:47)     Medication Review:     Current Facility-Administered Medications:     acetaminophen (TYLENOL) tablet 650 mg, 650 mg, Oral, Q6H PRN, Ky Plascencia MD, 650 mg at 06/27/24 2056    ascorbic acid (VITAMIN C) tablet 500 mg, 500 mg, Oral, Daily, Ky Plascencia MD, 500 mg at 06/28/24 0800    aspirin EC tablet 81 mg, 81 mg, Oral, Daily, Ky Plascencia MD, 81 mg at 06/27/24 0951    sennosides-docusate (PERICOLACE) 8.6-50 MG per tablet 2 tablet, 2 tablet,  Oral, BID PRN, 2 tablet at 06/28/24 2015 **AND** polyethylene glycol (MIRALAX) packet 17 g, 17 g, Oral, Daily PRN, 17 g at 06/15/24 1553 **AND** bisacodyl (DULCOLAX) EC tablet 5 mg, 5 mg, Oral, Daily PRN **AND** bisacodyl (DULCOLAX) suppository 10 mg, 10 mg, Rectal, Daily PRN, Ky Plascencia MD    Calcium Replacement - Follow Nurse / BPA Driven Protocol, , Does not apply, PRN, Ky Plascencia MD    carvedilol (COREG) tablet 25 mg, 25 mg, Oral, BID With Meals, Ky Plascencia MD, 25 mg at 06/27/24 0951    Diclofenac Sodium (VOLTAREN) 1 % gel 4 g, 4 g, Topical, 4x Daily PRN, Ky Plascencia MD, 4 g at 06/28/24 0800    DULoxetine (CYMBALTA) DR capsule 60 mg, 60 mg, Oral, Daily, Ky Plascencia MD, 60 mg at 06/28/24 0800    Enoxaparin Sodium (LOVENOX) syringe 90 mg, 1 mg/kg, Subcutaneous, Q12H, Kris Cade MD, 90 mg at 06/29/24 0540    [START ON 7/1/2024] estradiol (ESTRACE) vaginal cream 2 applicator, 2 g, Vaginal, Once per day on Monday Thursday, Maggie Bang MD    ferrous sulfate tablet 325 mg, 325 mg, Oral, Daily With Breakfast, Ky Plascencia MD, 325 mg at 06/28/24 0800    folic acid (FOLVITE) tablet 1,000 mcg, 1,000 mcg, Oral, Daily, Ky Plascencia MD, 1,000 mcg at 06/28/24 0800    [Held by provider] furosemide (LASIX) tablet 40 mg, 40 mg, Oral, Daily, Ky Plascencia MD, 40 mg at 06/27/24 0951    isosorbide mononitrate (IMDUR) 24 hr tablet 60 mg, 60 mg, Oral, Daily, Ky Plascencia MD, 60 mg at 06/28/24 0800    leflunomide (ARAVA) tablet 20 mg, 20 mg, Oral, Daily, Ky Plascencia MD, 20 mg at 06/28/24 0800    levothyroxine (SYNTHROID, LEVOTHROID) tablet 75 mcg, 75 mcg, Oral, Q AM, Ky Plascencia MD, 75 mcg at 06/29/24 0540    Lidocaine 4 % 1 patch, 1 patch, Transdermal, Daily PRN, Ky Plascencia MD, 1 patch at 06/27/24 1033    losartan (COZAAR) tablet 50 mg, 50 mg, Oral, Daily, Ky Plascencia MD, 50 mg at 06/27/24 0950    Magnesium Low Dose Replacement -  Follow Nurse / BPA Driven Protocol, , Does not apply, PRNTemo Donald L, MD    methenamine (HIPREX) tablet 1 g, 1 g, Oral, BID, Ky Plascencia MD, 1 g at 06/28/24 2008    multivitamin with minerals 1 tablet, 1 tablet, Oral, Daily, Ky Plascencia MD, 1 tablet at 06/28/24 0800    [DISCONTINUED] Morphine sulfate (PF) injection 1 mg, 1 mg, Intravenous, Q4H PRN **AND** naloxone (NARCAN) injection 0.4 mg, 0.4 mg, Intravenous, Q5 Min PRN, Ky Plascencia MD    nitroglycerin (NITROSTAT) SL tablet 0.4 mg, 0.4 mg, Sublingual, Q5 Min PRN, Ky Plascencia MD    ondansetron (ZOFRAN) injection 4 mg, 4 mg, Intravenous, Q6H PRN, Ky Plascencia MD, 4 mg at 06/28/24 0856    pantoprazole (PROTONIX) EC tablet 40 mg, 40 mg, Oral, Daily, Ky Plascencia MD, 40 mg at 06/27/24 0952    Phosphorus Replacement - Follow Nurse / BPA Driven Protocol, , Does not apply, Temo PALACIO Donald L, MD    Potassium Replacement - Follow Nurse / BPA Driven Protocol, , Does not apply, PRNTemo Donald L, MD    sodium chloride 0.9 % flush 10 mL, 10 mL, Intravenous, PRN, Ky Plascencia MD, 10 mL at 06/12/24 2200    sodium chloride 0.9 % flush 10 mL, 10 mL, Intravenous, Q12H, Ky Plascencia MD, 10 mL at 06/28/24 0811    sodium chloride 0.9 % flush 10 mL, 10 mL, Intravenous, PRNTemo Donald L, MD    sodium chloride 0.9 % infusion 40 mL, 40 mL, Intravenous, PRN, Ky Plascencia MD, 40 mL at 06/25/24 1144    spironolactone (ALDACTONE) tablet 25 mg, 25 mg, Oral, Daily, Ky Plascencia MD, 25 mg at 06/28/24 0800    traMADol (ULTRAM) tablet 50 mg, 50 mg, Oral, Q6H PRN, Kris Cade MD, 50 mg at 06/29/24 0540    Assessment and Plan:    Left ureter stone: POD#8. Stent and kay removed at bedside.     PINKY: Improving since yesterday on IVF. Monitor now that stent and kay are out.     Recurrent UTI: Monitor for recurrence. Stone has been treated. Kay is out.    Incontinence: Prompted voids Q4-6H. Assist with transfer  to commode. Patient has full time caregivers that should be able to help clean up any incontinence; she certainly has nursing care here. It would be ideal to start her back to a path of voiding on her own and assisting to commode while she is here in the hospital.     URO DISPO: Follow up in urology outpatient in 2-3 weeks to reassess. Message sent to urology .     I discussed the patient's findings and my recommendations with patient, nursing staff, and caregiver    (Please note that portions of this note were completed with a voice recognition program.)    Albaro Dixon MD  06/29/24  10:09 CDT    Time: Time spent: 30 minutes spent performing evaluation and management, chart review, and discussion with patient, > 50% of time spent in face-to-face encounter

## 2024-06-29 NOTE — THERAPY TREATMENT NOTE
Acute Care - Physical Therapy Treatment Note  [facility]     Patient Name: Tawny Shin  : 1955  MRN: [id number]  Today's Date: 2024      Visit Dx:     ICD-10-CM ICD-9-CM   1. Acute UTI (urinary tract infection)  N39.0 599.0   2. Altered mental status, unspecified altered mental status type  R41.82 780.97   3. Open wound of right buttock, sequela  S31.819S 906.0   4. Cyst of buttocks  L72.9 706.2   5. Dysphagia, unspecified type  R13.10 787.20   6. Impaired mobility [Z74.09]  Z74.09 799.89   7. Hydronephrosis with urinary obstruction due to ureteral calculus  N13.2 592.1     591   8. Acute cholecystitis  K81.0 575.0   9. Acute congestive heart failure, unspecified heart failure type  I50.9 428.0     Patient Active Problem List   Diagnosis    T12 compression fracture, initial encounter    Chronic embolism and thrombosis of unspecified deep veins of left lower extremity    Chronic anticoagulation    Iron deficiency anemia    Osteoporosis    E coli bacteremia    Epidural hematoma    Pleural effusion, left    Functional neurological symptom disorder with weakness or paralysis    Class 1 obesity due to excess calories with serious comorbidity and body mass index (BMI) of 33.0 to 33.9 in adult    Rheumatoid arthritis involving multiple sites    Chronic heart failure with preserved ejection fraction    Venous insufficiency    Coronary artery disease involving native coronary artery of native heart without angina pectoris    Sick sinus syndrome    Essential hypertension    Pressure injury of skin of heel    Chronic pain    Anemia of chronic disease    Cholelithiasis    Pressure injury of skin of buttock    Near functional paraplegia    Skin cancer    Squamous cell carcinoma of back    Hematoma    Right-sided chest wall pain    Hyperlipidemia LDL goal <70    Malodorous urine    Stage 3b chronic kidney disease    Cyst of buttocks    Sepsis due to Escherichia coli without acute organ dysfunction    Generalized  weakness    Paralysis    History of urinary retention    Bacteremia due to Enterobacter species    Bacteremia    Hypothyroidism    Abnormal CT of the abdomen    Presence of cardiac pacemaker    Altered mental status    UTI (urinary tract infection)    Esophageal dysphagia    Hydronephrosis with urinary obstruction due to ureteral calculus    Acute cholecystitis     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    Elevated cholesterol     Eustachian tube dysfunction     Heart disease     Herpes simplex     History of transfusion     Hyperlipidemia     Hypertension     Hypothyroidism 12/23/2020    Intrinsic asthma 12/23/2020    Knee dislocation     Labral tear of right hip joint     Laryngitis sicca     Laryngitis, chronic     Left carotid bruit 03/09/2016    MI (myocardial infarction)     Myalgia due to statin 06/25/2019    Open wound of right hip 09/14/2021    Osteomyelitis of right femur 07/06/2021    Otorrhea     Pacemaker 11/17/2016    Primary osteoarthritis of left knee 12/23/2020    Psoriasis vulgaris 12/23/2020    S/P coronary artery stent placement 03/09/2016    Sensorineural hearing loss     Seropositive rheumatoid arthritis of multiple sites 12/27/2019    Overview:  -myochrysine '93-'96 -methotrexate '96--->11/98;r/s  restarted 2/99--> 8/14 (anemia) -sulfasalazine- not effective -penicillamine 6/98-->10/98; no effect -leflunomide 11/98--> - Humira '13-->didn't take - Enbrel 12/14-->3/15- no  "effect!   Last Assessment & Plan:  - \"aching all over\" because she had to be off her anti-rheumatic drugs for 2 weeks in preparation for her R knee surgery - he    Sick sinus syndrome 12/27/2019    Sjogren's disease     Spondylolisthesis of lumbar region 01/17/2018    Syncope, recurrent 02/08/2021    Urinary tract infection     UTI (urinary tract infection) 04/19/2024     Past Surgical History:   Procedure Laterality Date    A-V CARDIAC PACEMAKER INSERTION  2016    ATRIAL CARDIAC PACEMAKER INSERTION      CARDIAC CATHETERIZATION      CATARACT EXTRACTION      CERVICAL CORPECTOMY N/A 3/3/2021    Procedure: CERVICAL 6 CORPECTOMY WITH TITANIUM CAGE WITH NEURO MONITORING;  Surgeon: Bandar hSea MD;  Location:  [facility];  Service: Neurosurgery;  Laterality: N/A;    COLONOSCOPY  11/08/2011    One fold in the ascending colon which showed ulcer otherwise normal exam    COLONOSCOPY  11/12/2004    Normal exam repeat in five years    CORONARY ANGIOPLASTY WITH STENT PLACEMENT      X 2; 2013 & 2014    ENDOSCOPY  07/10/2014    Normal exam    ENDOSCOPY N/A 5/30/2024    Procedure: ESOPHAGOGASTRODUODENOSCOPY WITH ANESTHESIA;  Surgeon: Taiwo Sanchez MD;  Location: [facility] ENDOSCOPY;  Service: Gastroenterology;  Laterality: N/A;  preop; dysphagia  postop: balloon dilation  PCP Jesus Manuel jeff    FLAP LEG Right 9/14/2021    Procedure: RIGHT GLUTEAL FASCIOCUTANEOUS ADVANCEMENT FLAP AND RIGHT TENSOR FASCIAL JESSICA FLAP;  Surgeon: Amadeo Turner MD;  Location: [facility] OR;  Service: Plastics;  Laterality: Right;    HIP ABDUCTION TENOTOMY BILATERAL Right 1/14/2021    Procedure: RIGHT HIP GLUTEUS MEDLUS / MINIMUS REPAIR, POSSIBLE ACHILLES ALLOGRAFT;  Surgeon: Nino Carlson MD;  Location: [facility] OR;  Service: Orthopedics;  Laterality: Right;    INCISION AND DRAINAGE ABSCESS Right 6/4/2022    Procedure: INCISION AND DRAINAGE ABSCESS right hip;  Surgeon: Magda Salcido MD;  Location:  [facility];  Service: General;  Laterality: Right; "    INCISION AND DRAINAGE ABSCESS Right 6/10/2022    Procedure: RIGHT HIP INCISION AND DRAINAGE. MD NEEDS 3L VANC IRRIGATION, CURRETTES, DAICANS, KERLEX ROLLS;  Surgeon: Amadeo Turner MD;  Location:  [facility];  Service: Plastics;  Laterality: Right;    INCISION AND DRAINAGE HIP Right 2/9/2021    Procedure: HIP INCISION AND DRAINAGE;  Surgeon: Nino Carlson MD;  Location:  [facility];  Service: Orthopedics;  Laterality: Right;    INCISION AND DRAINAGE LEG Right 10/24/2021    Procedure: INCISION AND DRAINAGE LOWER EXTREMITY;  Surgeon: Amadeo Turner MD;  Location:  [facility];  Service: Plastics;  Laterality: Right;    INCISION AND DRAINAGE OF WOUND Right 7/8/2021    Procedure: INCISION AND DRAINAGE WOUND RIGHT HIP;  Surgeon: James Huntley MD;  Location:  [facility];  Service: Orthopedics;  Laterality: Right;    JOINT REPLACEMENT      KYPHOPLASTY WITH BIOPSY Bilateral 10/26/2021    Procedure: THOARCIC 12 KYPHOPLASTY WITH BIOPSY;  Surgeon: Bandar Shea MD;  Location:  [facility];  Service: Neurosurgery;  Laterality: Bilateral;    LEG DEBRIDEMENT Right 9/14/2021    Procedure: DEBRIDEMENT OF RIGHT HIP WOUND, RIGHT GLUTEAL FASCIOCUTANEOUS ADVANCEMENT FLAP AND RIGHT TENSOR FASCIAL JESSICA FLAP;  Surgeon: Amadeo Turner MD;  Location:  [facility];  Service: Plastics;  Laterality: Right;    LUMBAR DISCECTOMY Right 3/23/2021    Procedure: LUMBAR DISCECTOMY MICRO, Lumbar 1/2 right;  Surgeon: Bandar Shea MD;  Location:  [facility];  Service: Neurosurgery;  Laterality: Right;    LUMBAR FUSION N/A 1/19/2018    Procedure: L3-4,L4-5 DECOMPRESSION, POSTERIOR SPINAL FUSION WITH INSTRUMENTATION;  Surgeon: Fortino Oropeza MD;  Location:  [facility];  Service:     LUMBAR LAMINECTOMY WITH FUSION Left 1/17/2018    Procedure: LEFT L3-4 L4-5 LATERAL LUMBAR INTERBODY FUSION;  Surgeon: Fortino Oropeza MD;  Location:  [facility];  Service:     MASS EXCISION Right 4/23/2024    Procedure: RIGHT BUTTOCK MASS  EXCISION;  Surgeon: Moises Keyes MD;  Location:  [facility];  Service: General;  Laterality: Right;    MYRINGOTOMY W/ TUBES  09/04/2014    TUBES NO LONGER IN PLACE    OTHER SURGICAL HISTORY      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 so hardware was removed and spacers were placed    REPLACEMENT TOTAL KNEE Right     URETEROSCOPY LASER LITHOTRIPSY WITH STENT INSERTION N/A 6/21/2024    Procedure: URETEROSCOPY LASER LITHOTRIPSY WITH STENT INSERTION LEFT;  Surgeon: Ky Plascencia MD;  Location:  [facility];  Service: Urology;  Laterality: N/A;     PT Assessment (Last 12 Hours)       PT Evaluation and Treatment       Row Name 06/29/24 1049          Physical Therapy Time and Intention    Subjective Information no complaints  -KJ     Document Type therapy note (daily note)  -KJ     Mode of Treatment physical therapy  -KJ     Patient Effort good  -KJ       Row Name 06/29/24 1049          General Information    Existing Precautions/Restrictions fall  -KJ       Row Name 06/29/24 1049          Pain    Pretreatment Pain Rating 5/10  -KJ     Posttreatment Pain Rating 5/10  -KJ     Pain Location - Side/Orientation Right  -KJ     Pain Location lower  -KJ     Pain Location - extremity  -KJ       Row Name 06/29/24 1049          Bed Mobility    Supine-Sit Jackson (Bed Mobility) verbal cues;minimum assist (75% patient effort);moderate assist (50% patient effort)  -KJ     Sit-Supine Jackson (Bed Mobility) moderate assist (50% patient effort);maximum assist (25% patient effort);verbal cues  -KJ       Row Name 06/29/24 1049          Balance    Balance Assessment sitting static balance  -KJ     Static Sitting Balance supervision  -KJ     Dynamic Sitting Balance minimal assist  -KJ     Position, Sitting Balance sitting edge of bed;unsupported  -KJ     Comment, Balance sit EOB x 15 minutes; BLE AROM and BUE AROM.Too weak to stand  -KJ       Row Name             Wound 04/23/24 0936 Right gluteal Incision    Wound - Properties Group  Placement Date: 04/23/24  -EP Placement Time: 0936  -EP Present on Original Admission: N  -EP Side: Right  -EP Location: gluteal  -EP Primary Wound Type: Incision  -EP    Retired Wound - Properties Group Placement Date: 04/23/24  -EP Placement Time: 0936  -EP Present on Original Admission: N  -EP Side: Right  -EP Location: gluteal  -EP Primary Wound Type: Incision  -EP    Retired Wound - Properties Group Date first assessed: 04/23/24  -EP Time first assessed: 0936  -EP Present on Original Admission: N  -EP Side: Right  -EP Location: gluteal  -EP Primary Wound Type: Incision  -EP      Row Name 06/29/24 1049          Positioning and Restraints    Pre-Treatment Position in bed  -KJ     Post Treatment Position bed  -KJ     In Bed call light within reach  -KJ               User Key  (r) = Recorded By, (t) = Taken By, (c) = Cosigned By      Initials Name Provider Type    Emilee Gloria PTA Physical Therapist Assistant    Sonny Thompson RN Registered Nurse                    Physical Therapy Education       Title: PT OT SLP Therapies (Done)       Topic: Physical Therapy (Done)       Point: Mobility training (Done)       Learning Progress Summary             Patient Acceptance, E, VU by KS at 6/20/2024 1715    Acceptance, E, VU by MS at 6/18/2024 1502    Comment: role of PT in her care                         Point: Home exercise program (Done)       Learning Progress Summary             Patient Acceptance, E, VU by KS at 6/20/2024 1715    Acceptance, E,D,TB, VU,DU,NR by  at 6/15/2024 1514    Acceptance, E,TB,D, VU,DU,NR by  at 6/14/2024 1550                         Point: Body mechanics (Done)       Learning Progress Summary             Patient Acceptance, E, VU by KS at 6/20/2024 1715    Acceptance, E,D,TB, VU,DU,NR by KH at 6/15/2024 1514    Acceptance, E,TB,D, VU,DU,NR by  at 6/14/2024 1550                         Point: Precautions (Done)       Learning Progress Summary             Patient Acceptance, E,  VU by KS at 6/20/2024 1715    Acceptance, E,D,TB, VU,DU,NR by  at 6/15/2024 1514    Acceptance, E,TB,D, VU,DU,NR by  at 6/14/2024 1550                                         User Key       Initials Effective Dates Name Provider Type Discipline    MS 07/11/23 -  Nicole Olson, PT, DPT, NCS Physical Therapist PT    KS 02/27/23 -  Eewlina Morales RN Registered Nurse Nurse     10/17/23 -  Stephanie Doll, RN Registered Nurse Nurse                  PT Recommendation and Plan     Plan of Care Reviewed With: patient  Progress: improving  Outcome Evaluation: PT tx completed. Pt c/o pn RLE. Bed mobility ModA using glide sheet. Sit EOB x 15 minutes S. Reports she is too weak to stand and pnful RLE. Recommend cont therapy for strengthening.   Outcome Measures       Row Name 06/29/24 1100 06/27/24 1400 06/26/24 1400       How much help from another person do you currently need...    Turning from your back to your side while in flat bed without using bedrails? 3  -KJ 2  -KJ 2  -KJ    Moving from lying on back to sitting on the side of a flat bed without bedrails? 2  -KJ 2  -KJ 2  -KJ    Moving to and from a bed to a chair (including a wheelchair)? 1  -KJ 1  -KJ 2  -KJ    Standing up from a chair using your arms (e.g., wheelchair, bedside chair)? 1  -KJ 1  -KJ 1  -KJ    Climbing 3-5 steps with a railing? 1  -KJ 1  -KJ 1  -KJ    To walk in hospital room? 1  -KJ 1  -KJ 1  -KJ    AM-PAC 6 Clicks Score (PT) 9  -KJ 8  -KJ 9  -KJ    Highest Level of Mobility Goal 3 --> Sit at edge of bed  -KJ 3 --> Sit at edge of bed  -KJ 3 --> Sit at edge of bed  -KJ       Functional Assessment    Outcome Measure Options AM-PAC 6 Clicks Basic Mobility (PT)  -KJ AM-PAC 6 Clicks Basic Mobility (PT)  -KJ AM-PAC 6 Clicks Basic Mobility (PT)  -KJ      Row Name 06/26/24 1300             How much help from another is currently needed...    Putting on and taking off regular lower body clothing? 2  -TS      Bathing (including washing, rinsing, and  drying) 2  -TS      Toileting (which includes using toilet bed pan or urinal) 2  -TS      Putting on and taking off regular upper body clothing 2  -TS      Taking care of personal grooming (such as brushing teeth) 3  -TS      Eating meals 3  -TS      AM-PAC 6 Clicks Score (OT) 14  -TS                User Key  (r) = Recorded By, (t) = Taken By, (c) = Cosigned By      Initials Name Provider Type    Emilee Gloria PTA Physical Therapist Assistant    Carolee Roger COTA Occupational Therapist Assistant                     Time Calculation:    PT Charges       Row Name 06/29/24 1121             Time Calculation    Start Time 1049  -KJ      Stop Time 1117  -KJ      Time Calculation (min) 28 min  -KJ      PT Received On 06/29/24  -KJ      PT Goal Re-Cert Due Date 07/12/24  -KJ         Time Calculation- PT    Total Timed Code Minutes- PT 28 minute(s)  -KJ                User Key  (r) = Recorded By, (t) = Taken By, (c) = Cosigned By      Initials Name Provider Type    Emilee Gloria PTA Physical Therapist Assistant                  Therapy Charges for Today       Code Description Service Date Service Provider Modifiers Qty    25079312251 HC PT THERAPEUTIC ACT EA 15 MIN 6/29/2024 Emilee Paul, SERENA GP 1    03828825740 HC PT THER PROC EA 15 MIN 6/29/2024 Emilee Paul, SERENA GP 1            PT G-Codes  Outcome Measure Options: AM-PAC 6 Clicks Basic Mobility (PT)  AM-PAC 6 Clicks Score (PT): 9  AM-PAC 6 Clicks Score (OT): 13    Emilee Paul PTA  6/29/2024

## 2024-06-29 NOTE — PLAN OF CARE
Goal Outcome Evaluation:  Plan of Care Reviewed With: patient        Progress: improving  Outcome Evaluation: PT tx completed. Pt c/o pn RLE. Bed mobility ModA using glide sheet. Sit EOB x 15 minutes S. Reports she is too weak to stand and pnful RLE. Recommend cont therapy for strengthening.

## 2024-06-29 NOTE — PROGRESS NOTES
"INFECTIOUS DISEASES PROGRESS NOTE    Patient:  Tawny Shin  YOB: 1955  MRN: [id number]   Admit date: 6/12/2024   Admitting Physician: Kris Cade,*  Primary Care Physician: Moises Oliveira MD    Chief Complaint: Right leg pain    Interval History: Patient states her right leg is hurting.  She indicates it involves the entire leg.  I told her that when I saw her yesterday morning she said it was much better.  She said it just got worse again yesterday.    Vascular studies did not reveal any DVT    She said she is going for surgery Monday now for her gallbladder.  She just had her Horan catheter removed \"about 10 minutes ago\"      Allergies:   Allergies   Allergen Reactions    Atorvastatin Other (See Comments)     LEG CRAMPS      Amoxicillin Rash    Escitalopram Rash    Nabumetone Rash    Niacin Er Rash    Penicillin G Rash    Penicillins Rash    Simvastatin Rash       Current Scheduled Medications:   ascorbic acid, 500 mg, Oral, Daily  aspirin, 81 mg, Oral, Daily  carvedilol, 25 mg, Oral, BID With Meals  DULoxetine, 60 mg, Oral, Daily  enoxaparin, 1 mg/kg, Subcutaneous, Q12H  [START ON 7/1/2024] estradiol, 2 g, Vaginal, Once per day on Monday Thursday  ferrous sulfate, 325 mg, Oral, Daily With Breakfast  folic acid, 1,000 mcg, Oral, Daily  [Held by provider] furosemide, 40 mg, Oral, Daily  isosorbide mononitrate, 60 mg, Oral, Daily  leflunomide, 20 mg, Oral, Daily  levothyroxine, 75 mcg, Oral, Q AM  losartan, 50 mg, Oral, Daily  methenamine, 1 g, Oral, BID  multivitamin with minerals, 1 tablet, Oral, Daily  pantoprazole, 40 mg, Oral, Daily  sodium chloride, 10 mL, Intravenous, Q12H  spironolactone, 25 mg, Oral, Daily      Current PRN Medications:    acetaminophen    senna-docusate sodium **AND** polyethylene glycol **AND** bisacodyl **AND** bisacodyl    Calcium Replacement - Follow Nurse / [facility] Driven Protocol    Diclofenac Sodium    Lidocaine    Magnesium Low Dose Replacement - " "Follow Nurse / [facility] Driven Protocol    [DISCONTINUED] Morphine **AND** naloxone    nitroglycerin    ondansetron    Phosphorus Replacement - Follow Nurse / [facility] Driven Protocol    Potassium Replacement - Follow Nurse / [facility] Driven Protocol    sodium chloride    sodium chloride    sodium chloride    traMADol              Objective     Vital Signs:  Temp (24hrs), Av.3 °F (36.8 °C), Min:97.6 °F (36.4 °C), Max:98.9 °F (37.2 °C)      /46 (BP Location: Right arm, Patient Position: Lying)   Pulse 88   Temp 98.1 °F (36.7 °C) (Oral)   Resp 16   Ht 167.6 cm (66\")   Wt 94.8 kg (209 lb)   LMP  (LMP Unknown)   SpO2 90%   BMI 33.73 kg/m²         Physical Exam:    General: Patient's sitting up in bed in no acute distress.  Caregiver at bedside  Respiratory: Effort even and unlabored, she is not conversationally dyspneic  Right lower extremity elevated on pillow.  She does have some tenderness to palpation somewhat greater below the knee.  Neuro: Alert and oriented, speech clear        Results Review:    I reviewed the patient's new clinical results.    Lab Results:    CBC:   Lab Results   Lab 24  0428 24  0437 24  0503 24  0338 24  0556 24  0447   WBC 5.03 4.74 4.18 4.39 4.86 4.53   HEMOGLOBIN 7.7* 7.8* 7.4* 7.2* 8.1* 8.1*   HEMATOCRIT 26.0* 26.1* 24.6* 23.8* 26.3* 27.2*   PLATELETS 170 175 151 150 160 151        AutoDiff:            Manual Diff:          Invalid input(s): \"MONABS\"          CMP:   Lab Results   Lab 24  0556 24  0404 24  0447   SODIUM 136 137 138   POTASSIUM 3.5 3.0* 3.8   CHLORIDE 99 99 103   CO2 25.0 25.0 27.0   BUN 15 14 13   CREATININE 1.18* 1.22* 1.10*   CALCIUM 9.1 8.9 8.7   GLUCOSE 80 84 92       Estimated Creatinine Clearance: 55.2 mL/min (A) (by C-G formula based on SCr of 1.1 mg/dL (H)).    Culture Results:    Microbiology Results (last 10 days)       Procedure Component Value - Date/Time    Urine Culture - Urine, Urine, Catheter " [[id number]]  (Normal) Collected: 06/21/24 1116    Lab Status: Final result Specimen: Urine, Catheter Updated: 06/22/24 1152     Urine Culture No growth                 Radiology:         Imaging Results (Last 72 Hours)       Procedure Component Value Units Date/Time    US Venous Doppler Lower Extremity Right (duplex) [[id number]] Collected: 06/28/24 1432     Updated: 06/28/24 1435    Narrative:      History: Pain and swelling       Impression:      Impression: There is no evidence of deep venous thrombosis or  superficial thrombophlebitis of the right lower extremity.     Comments: Right lower extremity venous duplex exam was performed using  color Doppler flow, Doppler wave form analysis, and grayscale imaging,  with and without compression. There is no evidence of deep venous  thrombosis of the common femoral, superficial femoral, popliteal,  posterior tibial, and peroneal veins. There is no thrombus identified in  the saphenofemoral junction or the greater saphenous vein.         This report was signed and finalized on 6/28/2024 2:32 PM by Dr. Kannan Mcgraw MD.       US Ankle / Brachial Indices Extremity Complete [[id number]] Collected: 06/28/24 1354     Updated: 06/28/24 1358    Narrative:         History: [facility]     Comments: Bilateral lower extremity arterial with multi-level pulse  volume recordings and segmental pressures were performed at rest and  stress.     The right ankle/brachial index is 1.45. The waveforms are biphasic  without dampening. These findings are consistent with no significant  arterial insufficiency of the right lower extremity at rest.     The left ankle/brachial index is not obtainable due to  noncompressibility of the vessels. The waveforms are biphasic without  dampening.        Impression:      Impression:  1. No significant arterial insufficiency of the right lower extremity at  rest.  2. The left ankle/brachial index was not obtainable due to  noncompressibility of the vessels.          This report was signed and finalized on 6/28/2024 1:55 PM by Dr. Kannan Mcgraw MD.                   Active Hospital Problems    Diagnosis     **Altered mental status     Esophageal dysphagia     UTI (urinary tract infection)     Hydronephrosis with urinary obstruction due to ureteral calculus     Acute cholecystitis     Hypothyroidism     Stage 3b chronic kidney disease     Near functional paraplegia     Cholelithiasis     Essential hypertension     Sick sinus syndrome     Coronary artery disease involving native coronary artery of native heart without angina pectoris     Venous insufficiency     Rheumatoid arthritis involving multiple sites     Chronic heart failure with preserved ejection fraction     Class 1 obesity due to excess calories with serious comorbidity and body mass index (BMI) of 33.0 to 33.9 in adult     Functional neurological symptom disorder with weakness or paralysis     Chronic anticoagulation     Chronic embolism and thrombosis of unspecified deep veins of left lower extremity        IMPRESSION:  Carbapenem resistant Enterobacter cloacae isolated from urine on admission.  Patient completed a course of cefepime to which it was susceptible.   Obstructive left ureteral stone status post cystoscopy and left ureteral stent placement on June 21 Dr. Plascencia.  Indwelling catheter removed this morning.  Right lower extremity edema with some pale erythema.  Patient with continued complaints of right lower extremity pain.  Some pale erythema distally but no obvious cellulitis.  Cholelithiasis with nonvisualization of gallbladder on HIDA.  Current plan is for cholecystectomy Monday  Increasing creatinine-creatinine improved with IV hydration and holding Lasix  Right gluteal wound  Deconditioning patient stated that she stood today with therapy.        RECOMMENDATION:     Continue to elevate right lower extremity  No plan for reinitiation of antibiotic therapy at this time but certainly if vascular  study is negative and there is some progression of right lower extremity erythema, would consider.  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  Continue local wound care to right gluteal wound    Discussed with Dr. Louis Bang MD  06/29/24  10:52 CDT

## 2024-06-30 LAB
ANION GAP SERPL CALCULATED.3IONS-SCNC: 8 MMOL/L (ref 5–15)
BUN SERPL-MCNC: 12 MG/DL (ref 8–23)
BUN/CREAT SERPL: 13.8 (ref 7–25)
CALCIUM SPEC-SCNC: 8.6 MG/DL (ref 8.6–10.5)
CHLORIDE SERPL-SCNC: 105 MMOL/L (ref 98–107)
CO2 SERPL-SCNC: 26 MMOL/L (ref 22–29)
CREAT SERPL-MCNC: 0.87 MG/DL (ref 0.57–1)
DEPRECATED RDW RBC AUTO: 63.5 FL (ref 37–54)
EGFRCR SERPLBLD CKD-EPI 2021: 71.8 ML/MIN/1.73
ERYTHROCYTE [DISTWIDTH] IN BLOOD BY AUTOMATED COUNT: 19 % (ref 12.3–15.4)
GLUCOSE SERPL-MCNC: 92 MG/DL (ref 65–99)
HCT VFR BLD AUTO: 26.9 % (ref 34–46.6)
HGB BLD-MCNC: 8 G/DL (ref 12–15.9)
MAGNESIUM SERPL-MCNC: 1.9 MG/DL (ref 1.6–2.4)
MCH RBC QN AUTO: 28.2 PG (ref 26.6–33)
MCHC RBC AUTO-ENTMCNC: 29.7 G/DL (ref 31.5–35.7)
MCV RBC AUTO: 94.7 FL (ref 79–97)
PHOSPHATE SERPL-MCNC: 2.9 MG/DL (ref 2.5–4.5)
PLATELET # BLD AUTO: 142 10*3/MM3 (ref 140–450)
PMV BLD AUTO: 11.3 FL (ref 6–12)
POTASSIUM SERPL-SCNC: 3.8 MMOL/L (ref 3.5–5.2)
RBC # BLD AUTO: 2.84 10*6/MM3 (ref 3.77–5.28)
SODIUM SERPL-SCNC: 139 MMOL/L (ref 136–145)
WBC NRBC COR # BLD AUTO: 4.29 10*3/MM3 (ref 3.4–10.8)

## 2024-06-30 PROCEDURE — 25010000002 ENOXAPARIN PER 10 MG: Performed by: FAMILY MEDICINE

## 2024-06-30 PROCEDURE — 80048 BASIC METABOLIC PNL TOTAL CA: CPT | Performed by: FAMILY MEDICINE

## 2024-06-30 PROCEDURE — 63710000001 PREDNISONE PER 1 MG: Performed by: FAMILY MEDICINE

## 2024-06-30 PROCEDURE — 85027 COMPLETE CBC AUTOMATED: CPT | Performed by: UROLOGY

## 2024-06-30 PROCEDURE — 99232 SBSQ HOSP IP/OBS MODERATE 35: CPT | Performed by: INTERNAL MEDICINE

## 2024-06-30 PROCEDURE — 83735 ASSAY OF MAGNESIUM: CPT | Performed by: FAMILY MEDICINE

## 2024-06-30 PROCEDURE — 84100 ASSAY OF PHOSPHORUS: CPT | Performed by: FAMILY MEDICINE

## 2024-06-30 RX ORDER — GUAIFENESIN 200 MG/10ML
200 LIQUID ORAL EVERY 4 HOURS PRN
Status: DISCONTINUED | OUTPATIENT
Start: 2024-06-30 | End: 2024-07-08 | Stop reason: HOSPADM

## 2024-06-30 RX ORDER — ALLOPURINOL 100 MG/1
100 TABLET ORAL DAILY
Status: DISCONTINUED | OUTPATIENT
Start: 2024-07-01 | End: 2024-07-08 | Stop reason: HOSPADM

## 2024-06-30 RX ADMIN — METHENAMINE HIPPURATE 1 G: 1000 TABLET ORAL at 22:42

## 2024-06-30 RX ADMIN — TRAMADOL HYDROCHLORIDE 50 MG: 50 TABLET ORAL at 11:29

## 2024-06-30 RX ADMIN — ISOSORBIDE MONONITRATE 60 MG: 60 TABLET, EXTENDED RELEASE ORAL at 08:42

## 2024-06-30 RX ADMIN — Medication 10 ML: at 08:43

## 2024-06-30 RX ADMIN — ENOXAPARIN SODIUM 90 MG: 100 INJECTION SUBCUTANEOUS at 06:33

## 2024-06-30 RX ADMIN — LOSARTAN POTASSIUM 50 MG: 50 TABLET, FILM COATED ORAL at 08:42

## 2024-06-30 RX ADMIN — FERROUS SULFATE TAB 325 MG (65 MG ELEMENTAL FE) 325 MG: 325 (65 FE) TAB at 08:42

## 2024-06-30 RX ADMIN — Medication 1 TABLET: at 08:42

## 2024-06-30 RX ADMIN — Medication 10 ML: at 22:43

## 2024-06-30 RX ADMIN — OXYCODONE HYDROCHLORIDE AND ACETAMINOPHEN 500 MG: 500 TABLET ORAL at 08:42

## 2024-06-30 RX ADMIN — CARVEDILOL 25 MG: 25 TABLET, FILM COATED ORAL at 08:43

## 2024-06-30 RX ADMIN — COLCHICINE 0.6 MG: 0.6 TABLET ORAL at 08:42

## 2024-06-30 RX ADMIN — DULOXETINE HYDROCHLORIDE 60 MG: 30 CAPSULE, DELAYED RELEASE ORAL at 08:42

## 2024-06-30 RX ADMIN — TRAMADOL HYDROCHLORIDE 50 MG: 50 TABLET ORAL at 00:20

## 2024-06-30 RX ADMIN — FOLIC ACID 1000 MCG: 1 TABLET ORAL at 08:42

## 2024-06-30 RX ADMIN — GUAIFENESIN 200 MG: 200 SOLUTION ORAL at 16:58

## 2024-06-30 RX ADMIN — LEFLUNOMIDE 20 MG: 20 TABLET ORAL at 08:42

## 2024-06-30 RX ADMIN — PREDNISONE 20 MG: 20 TABLET ORAL at 08:42

## 2024-06-30 RX ADMIN — SPIRONOLACTONE 25 MG: 25 TABLET ORAL at 08:42

## 2024-06-30 RX ADMIN — METHENAMINE HIPPURATE 1 G: 1000 TABLET ORAL at 08:43

## 2024-06-30 RX ADMIN — ASPIRIN 81 MG: 81 TABLET, COATED ORAL at 08:43

## 2024-06-30 RX ADMIN — LEVOTHYROXINE SODIUM 75 MCG: 0.07 TABLET ORAL at 06:34

## 2024-06-30 RX ADMIN — TRAMADOL HYDROCHLORIDE 50 MG: 50 TABLET ORAL at 22:43

## 2024-06-30 NOTE — PLAN OF CARE
Admitted 6/13 for uti, hypoxia  Uro following - lithotripsy w/ stent placement on 6/22; kay dc and stent removed 6/29  ID following - not on abx @ this time  Gen Surg - poss cholecystectomy next wk  Neuro - aphasia (resolved)  Lovenox  90 mg sq bid dvt prophylaxis; poss cholecystectomy on Tue (7/2)  Tramadol 50 mg q 6 hr prn for pain  Problem: Adult Inpatient Plan of Care  Goal: Plan of Care Review  Outcome: Ongoing, Progressing  Flowsheets (Taken 6/30/2024 0330)  Plan of Care Reviewed With: patient     Problem: Fall Injury Risk  Goal: Absence of Fall and Fall-Related Injury  Outcome: Ongoing, Progressing  Intervention: Promote Injury-Free Environment  Recent Flowsheet Documentation  Taken 6/30/2024 0300 by Manan Atkinson RN  Safety Promotion/Fall Prevention: safety round/check completed  Taken 6/30/2024 0200 by Manan Atkinson RN  Safety Promotion/Fall Prevention: safety round/check completed  Taken 6/30/2024 0100 by Manan Atkinson RN  Safety Promotion/Fall Prevention: safety round/check completed  Taken 6/30/2024 0000 by Manan Atkinson RN  Safety Promotion/Fall Prevention: safety round/check completed  Taken 6/29/2024 2300 by Manan Atkinson RN  Safety Promotion/Fall Prevention: safety round/check completed  Taken 6/29/2024 2200 by Manan Atkinson RN  Safety Promotion/Fall Prevention: safety round/check completed  Taken 6/29/2024 2100 by Manan Atkinson RN  Safety Promotion/Fall Prevention: safety round/check completed  Taken 6/29/2024 2000 by Manan Atkinson RN  Safety Promotion/Fall Prevention: safety round/check completed  Taken 6/29/2024 1900 by Manan Atkinson RN  Safety Promotion/Fall Prevention: safety round/check completed     Problem: Adult Inpatient Plan of Care  Goal: Absence of Hospital-Acquired Illness or Injury  Intervention: Identify and Manage Fall Risk  Recent Flowsheet Documentation  Taken 6/30/2024 0300 by Manan Atkinson RN  Safety Promotion/Fall Prevention: safety round/check completed  Taken 6/30/2024 0200 by Demetrio  SARAH Hinkle  Safety Promotion/Fall Prevention: safety round/check completed  Taken 6/30/2024 0100 by Manan Atkinson RN  Safety Promotion/Fall Prevention: safety round/check completed  Taken 6/30/2024 0000 by Manan Atkinson RN  Safety Promotion/Fall Prevention: safety round/check completed  Taken 6/29/2024 2300 by Manan Atkinson RN  Safety Promotion/Fall Prevention: safety round/check completed  Taken 6/29/2024 2200 by Manan Atkinson RN  Safety Promotion/Fall Prevention: safety round/check completed  Taken 6/29/2024 2100 by Manan Atkinson RN  Safety Promotion/Fall Prevention: safety round/check completed  Taken 6/29/2024 2000 by Manan Atkinson RN  Safety Promotion/Fall Prevention: safety round/check completed  Taken 6/29/2024 1900 by Manan Atkinson RN  Safety Promotion/Fall Prevention: safety round/check completed  Intervention: Prevent and Manage VTE (Venous Thromboembolism) Risk  Recent Flowsheet Documentation  Taken 6/29/2024 2047 by Manan Atkinson RN  VTE Prevention/Management: (lovenox) other (see comments)  Goal: Optimal Comfort and Wellbeing  Intervention: Monitor Pain and Promote Comfort  Recent Flowsheet Documentation  Taken 6/30/2024 0020 by Manan Atkinson RN  Pain Management Interventions: see MAR  Taken 6/29/2024 2047 by Manan Atkinson RN  Pain Management Interventions: see MAR   Goal Outcome Evaluation:  Plan of Care Reviewed With: patient

## 2024-06-30 NOTE — PROGRESS NOTES
INFECTIOUS DISEASES PROGRESS NOTE    Patient:  Tawny Shin  YOB: 1955  MRN: [id number]   Admit date: 6/12/2024   Admitting Physician: Kris Cade,*  Primary Care Physician: Moises Oliveira MD    Chief Complaint: Right lower extremity pain        Interval History: Patient still with right lower extremity pain.  She said both of her legs hurt but right is worse than left.  Her left leg does not hurt at rest but just when touched    Allergies:   Allergies   Allergen Reactions    Atorvastatin Other (See Comments)     LEG CRAMPS      Amoxicillin Rash    Escitalopram Rash    Nabumetone Rash    Niacin Er Rash    Penicillin G Rash    Penicillins Rash    Simvastatin Rash       Current Scheduled Medications:   ascorbic acid, 500 mg, Oral, Daily  aspirin, 81 mg, Oral, Daily  carvedilol, 25 mg, Oral, BID With Meals  colchicine, 0.6 mg, Oral, Daily  DULoxetine, 60 mg, Oral, Daily  enoxaparin, 1 mg/kg, Subcutaneous, Q12H  [START ON 7/1/2024] estradiol, 2 g, Vaginal, Once per day on Monday Thursday  ferrous sulfate, 325 mg, Oral, Daily With Breakfast  folic acid, 1,000 mcg, Oral, Daily  [Held by provider] furosemide, 40 mg, Oral, Daily  isosorbide mononitrate, 60 mg, Oral, Daily  leflunomide, 20 mg, Oral, Daily  levothyroxine, 75 mcg, Oral, Q AM  losartan, 50 mg, Oral, Daily  methenamine, 1 g, Oral, BID  multivitamin with minerals, 1 tablet, Oral, Daily  pantoprazole, 40 mg, Oral, Daily  predniSONE, 20 mg, Oral, Daily With Breakfast  sodium chloride, 10 mL, Intravenous, Q12H  spironolactone, 25 mg, Oral, Daily      Current PRN Medications:    acetaminophen    senna-docusate sodium **AND** polyethylene glycol **AND** bisacodyl **AND** bisacodyl    Calcium Replacement - Follow Nurse / BPA Driven Protocol    Diclofenac Sodium    Lidocaine    Magnesium Low Dose Replacement - Follow Nurse / BPA Driven Protocol    [DISCONTINUED] Morphine **AND** naloxone    nitroglycerin    ondansetron    Phosphorus  "Replacement - Follow Nurse / BPA Driven Protocol    Potassium Replacement - Follow Nurse / BPA Driven Protocol    sodium chloride    sodium chloride    sodium chloride    traMADol              Objective     Vital Signs:  Temp (24hrs), Av.2 °F (36.8 °C), Min:98 °F (36.7 °C), Max:98.4 °F (36.9 °C)      /64 (BP Location: Left arm, Patient Position: Lying)   Pulse 84   Temp 98.1 °F (36.7 °C) (Oral)   Resp 16   Ht 167.6 cm (66\")   Wt 94.8 kg (209 lb)   LMP  (LMP Unknown)   SpO2 94%   BMI 33.73 kg/m²         Physical Exam:    Gen: alert and oriented lying in bed  RLE: tender to palpation  PSYCH pleasant and cooperative      Results Review:    I reviewed the patient's new clinical results.    Lab Results:    CBC:   Lab Results   Lab 24  0437 24  0503 24  0338 24  0556 24  0447 24  0552   WBC 4.74 4.18 4.39 4.86 4.53 4.29   HEMOGLOBIN 7.8* 7.4* 7.2* 8.1* 8.1* 8.0*   HEMATOCRIT 26.1* 24.6* 23.8* 26.3* 27.2* 26.9*   PLATELETS 175 151 150 160 151 142        AutoDiff:            Manual Diff:          Invalid input(s): \"MONABS\"          CMP:   Lab Results   Lab 24  0404 24  0447 24  0552   SODIUM 137 138 139   POTASSIUM 3.0* 3.8 3.8   CHLORIDE 99 103 105   CO2 25.0 27.0 26.0   BUN 14 13 12   CREATININE 1.22* 1.10* 0.87   CALCIUM 8.9 8.7 8.6   BILIRUBIN  --  0.2  --    ALK PHOS  --  86  --    ALT (SGPT)  --  21  --    AST (SGOT)  --  25  --    GLUCOSE 84 92 92       Estimated Creatinine Clearance: 69.8 mL/min (by C-G formula based on SCr of 0.87 mg/dL).    Culture Results:    Microbiology Results (last 10 days)       Procedure Component Value - Date/Time    Urine Culture - Urine, Urine, Catheter [[id number]]  (Normal) Collected: 24 1116    Lab Status: Final result Specimen: Urine, Catheter Updated: 24 1152     Urine Culture No growth             C-Reactive Protein   Date Value Ref Range Status   2024 4.04 (H) 0.00 - 0.50 mg/dL Final "   07/18/2022 3.34 (H) 0.00 - 0.50 mg/dL Final   07/15/2022 2.92 (H) 0.00 - 0.50 mg/dL Final     Sed Rate   Date Value Ref Range Status   07/05/2022 75 (H) 0 - 30 mm/hr Final   06/30/2022 72 (H) 0 - 30 mm/hr Final   06/27/2022 38 (H) 0 - 30 mm/hr Final       Uric acid 6.2      Radiology:         Imaging Results (Last 72 Hours)       Procedure Component Value Units Date/Time    US Venous Doppler Lower Extremity Right (duplex) [[id number]] Collected: 06/28/24 1432     Updated: 06/28/24 1435    Narrative:      History: Pain and swelling       Impression:      Impression: There is no evidence of deep venous thrombosis or  superficial thrombophlebitis of the right lower extremity.     Comments: Right lower extremity venous duplex exam was performed using  color Doppler flow, Doppler wave form analysis, and grayscale imaging,  with and without compression. There is no evidence of deep venous  thrombosis of the common femoral, superficial femoral, popliteal,  posterior tibial, and peroneal veins. There is no thrombus identified in  the saphenofemoral junction or the greater saphenous vein.         This report was signed and finalized on 6/28/2024 2:32 PM by Dr. Kannan Mcgraw MD.       US Ankle / Brachial Indices Extremity Complete [[id number]] Collected: 06/28/24 1354     Updated: 06/28/24 1358    Narrative:         History: PAD     Comments: Bilateral lower extremity arterial with multi-level pulse  volume recordings and segmental pressures were performed at rest and  stress.     The right ankle/brachial index is 1.45. The waveforms are biphasic  without dampening. These findings are consistent with no significant  arterial insufficiency of the right lower extremity at rest.     The left ankle/brachial index is not obtainable due to  noncompressibility of the vessels. The waveforms are biphasic without  dampening.        Impression:      Impression:  1. No significant arterial insufficiency of the right lower extremity  at  rest.  2. The left ankle/brachial index was not obtainable due to  noncompressibility of the vessels.         This report was signed and finalized on 6/28/2024 1:55 PM by Dr. Kannan Mcgraw MD.                   Active Hospital Problems    Diagnosis     **Altered mental status     Acute gout     Esophageal dysphagia     UTI (urinary tract infection)     Hydronephrosis with urinary obstruction due to ureteral calculus     Acute cholecystitis     Hypothyroidism     Stage 3b chronic kidney disease     Near functional paraplegia     Cholelithiasis     Essential hypertension     Sick sinus syndrome     Coronary artery disease involving native coronary artery of native heart without angina pectoris     Venous insufficiency     Rheumatoid arthritis involving multiple sites     Chronic heart failure with preserved ejection fraction     Class 1 obesity due to excess calories with serious comorbidity and body mass index (BMI) of 33.0 to 33.9 in adult     Functional neurological symptom disorder with weakness or paralysis     Chronic anticoagulation     Chronic embolism and thrombosis of unspecified deep veins of left lower extremity        IMPRESSION:  Carbapenem resistant Enterobacter cloacae isolated from urine on admission.  Patient completed a course of cefepime to which it was susceptible.   Obstructive left ureteral stone status post cystoscopy and left ureteral stent placement on June 21 Dr. Plascencia.  Indwelling catheter removed 6/29  Right lower extremity edema with some pale erythema.  No DVT noted. Uric acid elevated.  Cholelithiasis with nonvisualization of gallbladder on HIDA.  Reportedly going for choley on Monday  Increasing creatinine-creatinine improved with IV hydration and holding Lasix  Right gluteal wound  Deconditioning       RECOMMENDATION:     Continue to elevate right lower extremity  Treatment for gout per hospitalist  Continue contact isolation for CRE this admission-history of MRSA and MDR  Pseudomonas.  Continue local wound care to right gluteal wound    Discussed with Dr. Alyssia Bang MD  06/30/24  11:41 CDT

## 2024-06-30 NOTE — PLAN OF CARE
Goal Outcome Evaluation:   VSS. A&Ox4. Disoriented at times. Room air. Complaints of pain to RLE. Complaints of a cough. See MAR for intervention. Turns per patient request. Purewick in place. Patient voiding. Will continue to monitor.

## 2024-06-30 NOTE — PROGRESS NOTES
[facility] Medicine Services  INPATIENT PROGRESS NOTE    Patient Name: Tawny Shin  Date of Admission: 6/12/2024  Today's Date: 06/30/24  Length of Stay: 9  Primary Care Physician: Moises Oliveira MD    Subjective   Chief Complaint: Recurrent UTI, gallstone, kidney stone   HPI   Right lower extremity pain is slightly improved compared to yesterday.  She still requiring tramadol for control.    This week patient had complained of right lower extremity pain.  The leg was edematous and painful at muscle and joint areas.  Concern for large DVT or arterial disease.  Ultrasound Doppler and NISREEN were checked and normal.  Uric acid and CRP are elevated with normal rheumatoid factor.    Regarding gallbladder disease case was discussed with Dr. Keyes, he will visit in the morning.  He recommends that the patient will not recover without surgery and will plan for procedure this week    Review of Systems    Negative for chills and fever.   HENT:  Negative for hearing loss, nosebleeds, tinnitus and trouble swallowing.    Eyes:  Negative for visual disturbance.   Respiratory:  Negative for cough, chest tightness, shortness of breath and wheezing.    Cardiovascular:  Negative for chest pain, palpitations and leg swelling.   Gastrointestinal:  Negative for abdominal distention, abdominal pain, blood in stool, constipation, diarrhea, nausea and vomiting.   Endocrine: Negative for cold intolerance, heat intolerance, polydipsia, polyphagia and polyuria.   Genitourinary:  Negative for decreased urine volume, difficulty urinating, dysuria, flank pain, frequency and hematuria.   Musculoskeletal:  Positive for arthralgias, gait problem and myalgias. Negative for joint swelling.   Skin:  Negative for rash.   Allergic/Immunologic: Negative for immunocompromised state.   Neurological:  Positive for weakness. Negative for dizziness, syncope, light-headedness and headaches.   Hematological:  Negative  for adenopathy. Does not bruise/bleed easily.   All pertinent negatives and positives are as above. All other systems have been reviewed and are negative unless otherwise stated.     Objective    Temp:  [98 °F (36.7 °C)-98.4 °F (36.9 °C)] 98.2 °F (36.8 °C)  Heart Rate:  [70-86] 76  Resp:  [16] 16  BP: (122-143)/(51-64) 122/59  Physical Exam  Vitals and nursing note reviewed.   Constitutional:       Comments: Chronically ill.  Deconditioning.   HENT:      Head: Normocephalic.   Eyes:      Conjunctiva/sclera: Conjunctivae normal.      Pupils: Pupils are equal, round, and reactive to light.   Cardiovascular:      Rate and Rhythm: Normal rate and regular rhythm.      Heart sounds: Normal heart sounds.   Pulmonary:      Effort: Pulmonary effort is normal. No respiratory distress.      Breath sounds: Normal breath sounds.   Abdominal:      General: Bowel sounds are normal. There is no distension.      Palpations: Abdomen is soft.      Comments: Obesity.  No tenderness noted with palpation.   Musculoskeletal:         General: No swelling.      Cervical back: Neck supple.   Skin:     General: Skin is warm and dry.      Capillary Refill: Capillary refill takes 2 to 3 seconds.      Findings: No rash.   Neurological:      Mental Status: She is alert and oriented to person, place, and time.      Motor: Weakness present.      Coordination: Coordination abnormal.      Gait: Gait abnormal.   Psychiatric:         Mood and Affect: Mood normal.         Behavior: Behavior normal.         Thought Content: Thought content normal.          Results Review:  I have reviewed the labs, radiology results, and diagnostic studies.    Laboratory Data:   Results from last 7 days   Lab Units 06/30/24  0552 06/29/24  0447 06/27/24  0556   WBC 10*3/mm3 4.29 4.53 4.86   HEMOGLOBIN g/dL 8.0* 8.1* 8.1*   HEMATOCRIT % 26.9* 27.2* 26.3*   PLATELETS 10*3/mm3 142 151 160        Results from last 7 days   Lab Units 06/30/24  0552 06/29/24  0447  06/28/24  0404   SODIUM mmol/L 139 138 137   POTASSIUM mmol/L 3.8 3.8 3.0*   CHLORIDE mmol/L 105 103 99   CO2 mmol/L 26.0 27.0 25.0   BUN mg/dL 12 13 14   CREATININE mg/dL 0.87 1.10* 1.22*   CALCIUM mg/dL 8.6 8.7 8.9   BILIRUBIN mg/dL  --  0.2  --    ALK PHOS U/L  --  86  --    ALT (SGPT) U/L  --  21  --    AST (SGOT) U/L  --  25  --    GLUCOSE mg/dL 92 92 84       Culture Data:   Urine Culture   Date Value Ref Range Status   06/21/2024 No growth  Final   06/16/2024 25,000 CFU/mL Enterobacter cloacae complex CRE (A)  Final     Comment:       Consider infectious disease consult.  Susceptibility results may not correlate to clinical outcomes.  Carbapenem resistant Enterobacteriaceae, patient may be an isolation risk.  No carbapenemase detected.       Radiology Data:   Imaging Results (Last 24 Hours)       ** No results found for the last 24 hours. **            I have reviewed the patient's current medications.     Assessment/Plan   Assessment  Active Hospital Problems    Diagnosis     **Altered mental status     Esophageal dysphagia     Acute gout     UTI (urinary tract infection)     Hydronephrosis with urinary obstruction due to ureteral calculus     Acute cholecystitis     Hypothyroidism     Stage 3b chronic kidney disease     Near functional paraplegia     Cholelithiasis     Essential hypertension     Sick sinus syndrome     Coronary artery disease involving native coronary artery of native heart without angina pectoris     Venous insufficiency     Rheumatoid arthritis involving multiple sites     Chronic heart failure with preserved ejection fraction     Class 1 obesity due to excess calories with serious comorbidity and body mass index (BMI) of 33.0 to 33.9 in adult     Functional neurological symptom disorder with weakness or paralysis     Chronic anticoagulation     Chronic embolism and thrombosis of unspecified deep veins of left lower extremity        Treatment Plan  Recurrent UTI/neurogenic bladder.   Cefepime/Flagyl antibiotics.  Continue HipRex.  Repeated UA, negative for bacteria.  Consult ID.  Urology consulted and following.   CT scan abdomen pelvic . Obstructive uropathy on the left with mild to moderate dilatation of the upper tracts of the left kidney and left ureter into the true pelvis where there is a 5 x 6 mm calculus within the left ureter approximately 4 to 5 cm above the UVJ- right ureter is decompressed and normal in appearance- No evidence of renal mass. No perinephric fluid collection present, gallbladder is mildly distended- gallstones within the gallbladder neck,  No pericholecystic inflammatory changes or biliary dilatation, Constipation,  No obstruction or free air, Normal appendix, Calcified fibroid within the lower uterine segment, Mild constipation, No obstruction or free air, Normal appendix, Small fat-containing periumbilical hernia, Previous kyphoplasty at T12, Previous fusion at L3-L4 and L4-L5, Left basilar atelectasis..  Recommendation from urology-evaluation of neurogenic bladder at a university setting like [place].     Obstructing renal calculi left side/moderate dilated of left ureter.  Callus 5 x 6 mm and left 4 to 5 cm above the UVJ junction.  Status post 6/21/2024 urethroscope laser lithotripsy with stent insertion left. Horan in place.     Gallstone, within the gallbladder neck/dysfunctional gallbladder.  Mild distended gallbladder.  Right upper quadrant tenderness.  Ultrasound of the gallbladder-Cholelithiasis with small shadowing stones and sludge within the region of the gallbladder neck- Moderate distention of the gallbladder- however no gallbladder wall thickening or pericholecystic fluid identified- No biliary dilatation.  HIDA scan-Nonvisualization of the gallbladder on images obtained up to 90 minutes- Cystic duct obstruction and acute cholecystitis cannot be excluded on the basis of this exam,  No evidence of common bile duct obstruction with emptying of activity  into the C-loop of the duodenum and proximal jejunum, Gallstones and sludge were demonstrated on recent ultrasound- combined with nonvisualization of the gallbladder exclude performance of an ejection fraction..  Discussed with general surgery Dr. Jo-plan for gallbladder removal this coming Tuesday, DC Eliquis today 6/22/2024 start Lovenox today, nursing communication to stop Lovenox 24 hours before surgery on Monday.  Dr. Jo also recommended cardiac clearance for surgery. Cardiology states okay to DC Lovenox today.    Cholecystectomy delay due to waxing and waning encephalopathy and concern for further cognitive decline.    General surgery to plan for procedure possibly this week.     Reflux/esophageal dysphagia. Dysphagia recent esophageal stretching, GI evaluated and recommended to continue outpatient follow-up.  SLP evaluated and recommended to continue regular diet.  Protonix . Zofran as needed.     Altered mental status/encephalopathy, improved  Consult neurology  She does not seem to receive anything overnight that could cause worsening mental status changes.  Metronidazole has an incidence of encephalopathy but average is a 28 days and worsening patients with liver disease which makes shortness seem to have at this point.  CT scan the head-No acute intracranial abnormality, Chronic sinusitis and left mastoid effusion.  Repeat this morning.  Medications reviewed  Chest x-ray-Stable chest exam without acute process, No visualized infiltrate.   Patient is on room air.    Right lower extremity pain uric acid and CRP are elevated.  Rheumatoid factor quantitative normal.  Consistent with gout, will start colchicine at lower level and add prednisone.  Lovenox full dose.  Continue to hold Eliquis  Doppler right lower extremity and NISREEN normal.  Pain control.     Hypothyroidism.  Synthroid.     Chronic stage IIIb renal failure.  Creatinine normalized.     Hypomagnesia. Resolved.     Hyponatremia.  Resolved.      Hypokalemia.  Resolved.  Magnesium normal.     Hypertension/sick sinus syndrome/venous insufficiency/CHF.  Eliquis.  Aspirin . Coreg . Lasix.  Imdur.  Cozaar . Aldactone.  Nitro as needed.     Right gluteal wound.  Consult wound care.     Rheumatoid arthritis. Arava.     Depression/anxiety.  Cymbalta.     Postmenopausal.  Estradiol cream.     Anemia.  Hemoglobin decreased.  Iron sulfate.  Folic acid.  No sign of acute bleed.     Pain . lidocaine patch.  Voltaren gel.  Ultram as needed.     Chronic DVT.  DC Eliquis due to possible surgery, on Lovenox.    Functional neurological symptom disorder with paraplegia.     Nutrition.  Cardiac diet.  Vitamin C.  Multivitamins.  Regular/house diet.  Boost supplement.     Deconditioning.  PT and OT consult.  Patient use a walker and bedbound at baseline.     Urine culture-100,000 CFU/mL Enterobacter cloacae complex  25,000 CFU/mL Enterobacter cloacae complex CRE   Blood culture-no growth for 5 days.     Dr. Shin's mom.  Patient already have home health at home.  Patient request for lift at discharge.     Medical Decision Making  Number and Complexity of problems: Recurrent UTI/altered mental status/reflux/rheumatoid arthritis/chronic 3B renal failure  Differential Diagnosis: None     Conditions and Status        Condition is unchanged.     MDM Data  External documents reviewed: Previous note .  Cardiac tracing (EKG, telemetry) interpretation: Sinus.  Radiology interpretation: X-ray/CT scan of the head  Labs reviewed: Laboratory  Any tests that were considered but not ordered: Lab in a.m.     Decision rules/scores evaluated (example [id number], [place], etc): None     Discussed with: Patient and family     Care Planning  Shared decision making: Patient and family  Code status and discussions: DNR     Disposition  Social Determinants of Health that impact treatment or disposition: From home  1 to 3 days.    Electronically signed by Kris Cade MD, 06/30/24, 14:35  CDT.

## 2024-07-01 ENCOUNTER — TELEPHONE (OUTPATIENT)
Dept: UROLOGY | Facility: CLINIC | Age: 69
End: 2024-07-01
Payer: MEDICARE

## 2024-07-01 LAB
ANION GAP SERPL CALCULATED.3IONS-SCNC: 11 MMOL/L (ref 5–15)
BUN SERPL-MCNC: 15 MG/DL (ref 8–23)
BUN/CREAT SERPL: 16.5 (ref 7–25)
CALCIUM SPEC-SCNC: 9 MG/DL (ref 8.6–10.5)
CHLORIDE SERPL-SCNC: 102 MMOL/L (ref 98–107)
CO2 SERPL-SCNC: 23 MMOL/L (ref 22–29)
CREAT SERPL-MCNC: 0.91 MG/DL (ref 0.57–1)
DEPRECATED RDW RBC AUTO: 64.5 FL (ref 37–54)
EGFRCR SERPLBLD CKD-EPI 2021: 68 ML/MIN/1.73
ERYTHROCYTE [DISTWIDTH] IN BLOOD BY AUTOMATED COUNT: 19.1 % (ref 12.3–15.4)
GLUCOSE SERPL-MCNC: 123 MG/DL (ref 65–99)
HCT VFR BLD AUTO: 29.1 % (ref 34–46.6)
HGB BLD-MCNC: 8.5 G/DL (ref 12–15.9)
MAGNESIUM SERPL-MCNC: 2 MG/DL (ref 1.6–2.4)
MCH RBC QN AUTO: 27.7 PG (ref 26.6–33)
MCHC RBC AUTO-ENTMCNC: 29.2 G/DL (ref 31.5–35.7)
MCV RBC AUTO: 94.8 FL (ref 79–97)
PHOSPHATE SERPL-MCNC: 2.5 MG/DL (ref 2.5–4.5)
PLATELET # BLD AUTO: 194 10*3/MM3 (ref 140–450)
PMV BLD AUTO: 11.1 FL (ref 6–12)
POTASSIUM SERPL-SCNC: 4.3 MMOL/L (ref 3.5–5.2)
RBC # BLD AUTO: 3.07 10*6/MM3 (ref 3.77–5.28)
SODIUM SERPL-SCNC: 136 MMOL/L (ref 136–145)
WBC NRBC COR # BLD AUTO: 5.42 10*3/MM3 (ref 3.4–10.8)

## 2024-07-01 PROCEDURE — 84100 ASSAY OF PHOSPHORUS: CPT | Performed by: FAMILY MEDICINE

## 2024-07-01 PROCEDURE — 99232 SBSQ HOSP IP/OBS MODERATE 35: CPT | Performed by: INTERNAL MEDICINE

## 2024-07-01 PROCEDURE — 97110 THERAPEUTIC EXERCISES: CPT

## 2024-07-01 PROCEDURE — 97530 THERAPEUTIC ACTIVITIES: CPT

## 2024-07-01 PROCEDURE — 83735 ASSAY OF MAGNESIUM: CPT | Performed by: FAMILY MEDICINE

## 2024-07-01 PROCEDURE — 80048 BASIC METABOLIC PNL TOTAL CA: CPT | Performed by: FAMILY MEDICINE

## 2024-07-01 PROCEDURE — 63710000001 PREDNISONE PER 1 MG: Performed by: FAMILY MEDICINE

## 2024-07-01 PROCEDURE — 85027 COMPLETE CBC AUTOMATED: CPT | Performed by: UROLOGY

## 2024-07-01 PROCEDURE — 97535 SELF CARE MNGMENT TRAINING: CPT

## 2024-07-01 PROCEDURE — 25010000002 ENOXAPARIN PER 10 MG: Performed by: FAMILY MEDICINE

## 2024-07-01 RX ADMIN — CARVEDILOL 25 MG: 25 TABLET, FILM COATED ORAL at 17:34

## 2024-07-01 RX ADMIN — Medication 1 TABLET: at 08:47

## 2024-07-01 RX ADMIN — LOSARTAN POTASSIUM 50 MG: 50 TABLET, FILM COATED ORAL at 08:49

## 2024-07-01 RX ADMIN — FERROUS SULFATE TAB 325 MG (65 MG ELEMENTAL FE) 325 MG: 325 (65 FE) TAB at 08:47

## 2024-07-01 RX ADMIN — ESTRADIOL 2 APPLICATOR: 0.1 CREAM VAGINAL at 08:49

## 2024-07-01 RX ADMIN — TRAMADOL HYDROCHLORIDE 50 MG: 50 TABLET ORAL at 20:09

## 2024-07-01 RX ADMIN — ENOXAPARIN SODIUM 90 MG: 100 INJECTION SUBCUTANEOUS at 17:34

## 2024-07-01 RX ADMIN — PREDNISONE 20 MG: 20 TABLET ORAL at 08:50

## 2024-07-01 RX ADMIN — FOLIC ACID 1000 MCG: 1 TABLET ORAL at 08:48

## 2024-07-01 RX ADMIN — GUAIFENESIN 200 MG: 200 SOLUTION ORAL at 20:22

## 2024-07-01 RX ADMIN — COLCHICINE 0.6 MG: 0.6 TABLET ORAL at 08:46

## 2024-07-01 RX ADMIN — PANTOPRAZOLE SODIUM 40 MG: 40 TABLET, DELAYED RELEASE ORAL at 08:50

## 2024-07-01 RX ADMIN — DICLOFENAC SODIUM 4 G: 10 GEL TOPICAL at 08:49

## 2024-07-01 RX ADMIN — OXYCODONE HYDROCHLORIDE AND ACETAMINOPHEN 500 MG: 500 TABLET ORAL at 08:49

## 2024-07-01 RX ADMIN — ENOXAPARIN SODIUM 90 MG: 100 INJECTION SUBCUTANEOUS at 05:44

## 2024-07-01 RX ADMIN — LEVOTHYROXINE SODIUM 75 MCG: 0.07 TABLET ORAL at 05:44

## 2024-07-01 RX ADMIN — Medication 10 ML: at 08:51

## 2024-07-01 RX ADMIN — ALLOPURINOL 100 MG: 100 TABLET ORAL at 08:48

## 2024-07-01 RX ADMIN — LEFLUNOMIDE 20 MG: 20 TABLET ORAL at 08:46

## 2024-07-01 RX ADMIN — DULOXETINE HYDROCHLORIDE 60 MG: 30 CAPSULE, DELAYED RELEASE ORAL at 08:48

## 2024-07-01 RX ADMIN — CARVEDILOL 25 MG: 25 TABLET, FILM COATED ORAL at 08:45

## 2024-07-01 RX ADMIN — SPIRONOLACTONE 25 MG: 25 TABLET ORAL at 08:46

## 2024-07-01 RX ADMIN — ISOSORBIDE MONONITRATE 60 MG: 60 TABLET, EXTENDED RELEASE ORAL at 08:48

## 2024-07-01 RX ADMIN — ASPIRIN 81 MG: 81 TABLET, COATED ORAL at 08:46

## 2024-07-01 RX ADMIN — METHENAMINE HIPPURATE 1 G: 1000 TABLET ORAL at 08:50

## 2024-07-01 RX ADMIN — METHENAMINE HIPPURATE 1 G: 1000 TABLET ORAL at 20:23

## 2024-07-01 RX ADMIN — Medication 10 ML: at 21:10

## 2024-07-01 NOTE — THERAPY TREATMENT NOTE
Acute Care - Physical Therapy Treatment Note  [facility]     Patient Name: Tawny Shin  : 1955  MRN: [id number]  Today's Date: 2024      Visit Dx:     ICD-10-CM ICD-9-CM   1. Acute UTI (urinary tract infection)  N39.0 599.0   2. Altered mental status, unspecified altered mental status type  R41.82 780.97   3. Open wound of right buttock, sequela  S31.819S 906.0   4. Cyst of buttocks  L72.9 706.2   5. Dysphagia, unspecified type  R13.10 787.20   6. Impaired mobility [Z74.09]  Z74.09 799.89   7. Hydronephrosis with urinary obstruction due to ureteral calculus  N13.2 592.1     591   8. Acute cholecystitis  K81.0 575.0   9. Acute congestive heart failure, unspecified heart failure type  I50.9 428.0     Patient Active Problem List   Diagnosis    T12 compression fracture, initial encounter    Chronic embolism and thrombosis of unspecified deep veins of left lower extremity    Chronic anticoagulation    Iron deficiency anemia    Osteoporosis    E coli bacteremia    Epidural hematoma    Pleural effusion, left    Functional neurological symptom disorder with weakness or paralysis    Class 1 obesity due to excess calories with serious comorbidity and body mass index (BMI) of 33.0 to 33.9 in adult    Rheumatoid arthritis involving multiple sites    Chronic heart failure with preserved ejection fraction    Venous insufficiency    Coronary artery disease involving native coronary artery of native heart without angina pectoris    Sick sinus syndrome    Essential hypertension    Pressure injury of skin of heel    Chronic pain    Anemia of chronic disease    Cholelithiasis    Pressure injury of skin of buttock    Near functional paraplegia    Skin cancer    Squamous cell carcinoma of back    Hematoma    Right-sided chest wall pain    Hyperlipidemia LDL goal <70    Malodorous urine    Stage 3b chronic kidney disease    Cyst of buttocks    Sepsis due to Escherichia coli without acute organ dysfunction    Generalized  weakness    Paralysis    History of urinary retention    Bacteremia due to Enterobacter species    Bacteremia    Hypothyroidism    Abnormal CT of the abdomen    Presence of cardiac pacemaker    Altered mental status    UTI (urinary tract infection)    Esophageal dysphagia    Hydronephrosis with urinary obstruction due to ureteral calculus    Acute cholecystitis    Acute gout     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    Elevated cholesterol     Eustachian tube dysfunction     Heart disease     Herpes simplex     History of transfusion     Hyperlipidemia     Hypertension     Hypothyroidism 12/23/2020    Intrinsic asthma 12/23/2020    Knee dislocation     Labral tear of right hip joint     Laryngitis sicca     Laryngitis, chronic     Left carotid bruit 03/09/2016    MI (myocardial infarction)     Myalgia due to statin 06/25/2019    Open wound of right hip 09/14/2021    Osteomyelitis of right femur 07/06/2021    Otorrhea     Pacemaker 11/17/2016    Primary osteoarthritis of left knee 12/23/2020    Psoriasis vulgaris 12/23/2020    S/P coronary artery stent placement 03/09/2016    Sensorineural hearing loss     Seropositive rheumatoid arthritis of multiple sites 12/27/2019    Overview:  -myochrysine '93-'96 -methotrexate '96--->11/98;r/s  restarted 2/99--> 8/14 (anemia) -sulfasalazine- not effective -penicillamine 6/98-->10/98; no effect -leflunomide 11/98--> - Humira '13-->didn't take - Enbrel  "12/14-->3/15- no effect!   Last Assessment & Plan:  - \"aching all over\" because she had to be off her anti-rheumatic drugs for 2 weeks in preparation for her R knee surgery - he    Sick sinus syndrome 12/27/2019    Sjogren's disease     Spondylolisthesis of lumbar region 01/17/2018    Syncope, recurrent 02/08/2021    Urinary tract infection     UTI (urinary tract infection) 04/19/2024     Past Surgical History:   Procedure Laterality Date    A-V CARDIAC PACEMAKER INSERTION  2016    ATRIAL CARDIAC PACEMAKER INSERTION      CARDIAC CATHETERIZATION      CATARACT EXTRACTION      CERVICAL CORPECTOMY N/A 3/3/2021    Procedure: CERVICAL 6 CORPECTOMY WITH TITANIUM CAGE WITH NEURO MONITORING;  Surgeon: Bandar Shea MD;  Location:  [facility];  Service: Neurosurgery;  Laterality: N/A;    COLONOSCOPY  11/08/2011    One fold in the ascending colon which showed ulcer otherwise normal exam    COLONOSCOPY  11/12/2004    Normal exam repeat in five years    CORONARY ANGIOPLASTY WITH STENT PLACEMENT      X 2; 2013 & 2014    ENDOSCOPY  07/10/2014    Normal exam    ENDOSCOPY N/A 5/30/2024    Procedure: ESOPHAGOGASTRODUODENOSCOPY WITH ANESTHESIA;  Surgeon: Taiwo Sanchez MD;  Location: [facility] ENDOSCOPY;  Service: Gastroenterology;  Laterality: N/A;  preop; dysphagia  postop: balloon dilation  PCP Jesus Manuel jeff    FLAP LEG Right 9/14/2021    Procedure: RIGHT GLUTEAL FASCIOCUTANEOUS ADVANCEMENT FLAP AND RIGHT TENSOR FASCIAL JESSICA FLAP;  Surgeon: Amadeo Turner MD;  Location:  [facility];  Service: Plastics;  Laterality: Right;    HIP ABDUCTION TENOTOMY BILATERAL Right 1/14/2021    Procedure: RIGHT HIP GLUTEUS MEDLUS / MINIMUS REPAIR, POSSIBLE ACHILLES ALLOGRAFT;  Surgeon: Nino Carlson MD;  Location:  [facility];  Service: Orthopedics;  Laterality: Right;    INCISION AND DRAINAGE ABSCESS Right 6/4/2022    Procedure: INCISION AND DRAINAGE ABSCESS right hip;  Surgeon: Magda Salcido MD;  Location:  [facility];  Service: General;  " Laterality: Right;    INCISION AND DRAINAGE ABSCESS Right 6/10/2022    Procedure: RIGHT HIP INCISION AND DRAINAGE. MD NEEDS 3L VANC IRRIGATION, CURRETTES, DAICANS, KERLEX ROLLS;  Surgeon: Amadeo Turner MD;  Location:  [facility];  Service: Plastics;  Laterality: Right;    INCISION AND DRAINAGE HIP Right 2/9/2021    Procedure: HIP INCISION AND DRAINAGE;  Surgeon: Nino Carlson MD;  Location:  [facility];  Service: Orthopedics;  Laterality: Right;    INCISION AND DRAINAGE LEG Right 10/24/2021    Procedure: INCISION AND DRAINAGE LOWER EXTREMITY;  Surgeon: Amadeo Turner MD;  Location:  [facility];  Service: Plastics;  Laterality: Right;    INCISION AND DRAINAGE OF WOUND Right 7/8/2021    Procedure: INCISION AND DRAINAGE WOUND RIGHT HIP;  Surgeon: James Huntley MD;  Location:  [facility];  Service: Orthopedics;  Laterality: Right;    JOINT REPLACEMENT      KYPHOPLASTY WITH BIOPSY Bilateral 10/26/2021    Procedure: THOARCIC 12 KYPHOPLASTY WITH BIOPSY;  Surgeon: Bandar Shea MD;  Location:  [facility];  Service: Neurosurgery;  Laterality: Bilateral;    LEG DEBRIDEMENT Right 9/14/2021    Procedure: DEBRIDEMENT OF RIGHT HIP WOUND, RIGHT GLUTEAL FASCIOCUTANEOUS ADVANCEMENT FLAP AND RIGHT TENSOR FASCIAL JESSICA FLAP;  Surgeon: Amadeo Turner MD;  Location:  [facility];  Service: Plastics;  Laterality: Right;    LUMBAR DISCECTOMY Right 3/23/2021    Procedure: LUMBAR DISCECTOMY MICRO, Lumbar 1/2 right;  Surgeon: Bandar Shea MD;  Location:  [facility];  Service: Neurosurgery;  Laterality: Right;    LUMBAR FUSION N/A 1/19/2018    Procedure: L3-4,L4-5 DECOMPRESSION, POSTERIOR SPINAL FUSION WITH INSTRUMENTATION;  Surgeon: Fortino Oropeza MD;  Location:  [facility];  Service:     LUMBAR LAMINECTOMY WITH FUSION Left 1/17/2018    Procedure: LEFT L3-4 L4-5 LATERAL LUMBAR INTERBODY FUSION;  Surgeon: Fortino Oropeza MD;  Location:  [facility];  Service:     MASS EXCISION Right 4/23/2024    Procedure: RIGHT  BUTTOCK MASS EXCISION;  Surgeon: Moises Keyes MD;  Location:  [facility];  Service: General;  Laterality: Right;    MYRINGOTOMY W/ TUBES  09/04/2014    TUBES NO LONGER IN PLACE    OTHER SURGICAL HISTORY      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 so hardware was removed and spacers were placed    REPLACEMENT TOTAL KNEE Right     URETEROSCOPY LASER LITHOTRIPSY WITH STENT INSERTION N/A 6/21/2024    Procedure: URETEROSCOPY LASER LITHOTRIPSY WITH STENT INSERTION LEFT;  Surgeon: Ky Plascencia MD;  Location:  [facility];  Service: Urology;  Laterality: N/A;     PT Assessment (Last 12 Hours)       PT Evaluation and Treatment       Row Name 07/01/24 1014          Physical Therapy Time and Intention    Subjective Information complains of;pain  -NW     Document Type therapy note (daily note)  -NW     Mode of Treatment physical therapy  -NW     Patient Effort good  -NW       Row Name 07/01/24 1014          General Information    Existing Precautions/Restrictions fall  -NW       Row Name 07/01/24 1014          Pain    Pretreatment Pain Rating 5/10  -NW     Posttreatment Pain Rating 5/10  -NW     Pain Location - Side/Orientation Right  -NW     Pain Location lower  -NW     Pain Location - extremity  -NW       Row Name 07/01/24 1014          Bed Mobility    Rolling Left [place] (Bed Mobility) verbal cues;minimum assist (75% patient effort);moderate assist (50% patient effort)  -NW     Supine-Sit [place] (Bed Mobility) verbal cues;minimum assist (75% patient effort);moderate assist (50% patient effort)  -NW     Sit-Supine [place] (Bed Mobility) moderate assist (50% patient effort);maximum assist (25% patient effort);verbal cues  -NW     Assistive Device (Bed Mobility) bed rails;draw sheet  -NW     Comment, (Bed Mobility) extra time  -NW       Row Name 07/01/24 1014          Gait/Stairs (Locomotion)    Comment, (Gait/Stairs) sat EOB working on BLE strengthening ex's, unable to stand due to pain/weakness  -NW       Row Name  07/01/24 1014          Safety Issues, Functional Mobility    Impairments Affecting Function (Mobility) balance;pain;strength  -NW       Row Name 07/01/24 1014          Motor Skills    Comments, Therapeutic Exercise AROM BLEs x10-15 sitting EOB  -NW       Row Name             Wound 04/23/24 0936 Right gluteal Incision    Wound - Properties Group Placement Date: 04/23/24  -EP Placement Time: 0936  -EP Present on Original Admission: N  -EP Side: Right  -EP Location: gluteal  -EP Primary Wound Type: Incision  -EP    Retired Wound - Properties Group Placement Date: 04/23/24  -EP Placement Time: 0936  -EP Present on Original Admission: N  -EP Side: Right  -EP Location: gluteal  -EP Primary Wound Type: Incision  -EP    Retired Wound - Properties Group Date first assessed: 04/23/24  -EP Time first assessed: 0936  -EP Present on Original Admission: N  -EP Side: Right  -EP Location: gluteal  -EP Primary Wound Type: Incision  -EP      Row Name 07/01/24 1014          Positioning and Restraints    Pre-Treatment Position in bed  -NW     Post Treatment Position bed  -NW     In Bed fowlers;call light within reach;encouraged to call for assist;with family/caregiver  -NW               User Key  (r) = Recorded By, (t) = Taken By, (c) = Cosigned By      Initials Name Provider Type    Angle Richter PTA Physical Therapist Assistant    Sonny Thompson, RN Registered Nurse                    Physical Therapy Education       Title: PT OT SLP Therapies (Done)       Topic: Physical Therapy (Done)       Point: Mobility training (Done)       Learning Progress Summary             Patient Acceptance, E, VU by KS at 6/20/2024 1715    Acceptance, E, VU by MS at 6/18/2024 1502    Comment: role of PT in her care                         Point: Home exercise program (Done)       Learning Progress Summary             Patient Acceptance, E, VU by KS at 6/20/2024 1715    Acceptance, E,D,TB, VU,DU,NR by KH at 6/15/2024 1514    Acceptance, E,TB,D,  VU,DU,NR by  at 6/14/2024 1550                         Point: Body mechanics (Done)       Learning Progress Summary             Patient Acceptance, E, VU by KS at 6/20/2024 1715    Acceptance, E,D,TB, VU,DU,NR by  at 6/15/2024 1514    Acceptance, E,TB,D, VU,DU,NR by  at 6/14/2024 1550                         Point: Precautions (Done)       Learning Progress Summary             Patient Acceptance, E, VU by KS at 6/20/2024 1715    Acceptance, E,D,TB, VU,DU,NR by  at 6/15/2024 1514    Acceptance, E,TB,D, VU,DU,NR by  at 6/14/2024 1550                                         User Key       Initials Effective Dates Name Provider Type Discipline    MS 07/11/23 -  Nicole Olson, PT, DPT, NCS Physical Therapist PT    KS 02/27/23 -  Ewelina Morales RN Registered Nurse Nurse     10/17/23 -  Stephanie Doll RN Registered Nurse Nurse                  PT Recommendation and Plan     Plan of Care Reviewed With: patient  Progress: no change  Outcome Evaluation: Bed mobility mod x2. Sat EOB working on sit-stands x3 mod x2, pt unable to take steps due to weakness, attempt marching in place pt unable as well. Tolerated A-AAROM BLEs sitting EOB. Pt will need cont therapy  upon d/c for strength, mobility and safety   Outcome Measures       Row Name 06/29/24 1100             How much help from another person do you currently need...    Turning from your back to your side while in flat bed without using bedrails? 3  -KJ      Moving from lying on back to sitting on the side of a flat bed without bedrails? 2  -KJ      Moving to and from a bed to a chair (including a wheelchair)? 1  -KJ      Standing up from a chair using your arms (e.g., wheelchair, bedside chair)? 1  -KJ      Climbing 3-5 steps with a railing? 1  -KJ      To walk in hospital room? 1  -KJ      AM-PAC 6 Clicks Score (PT) 9  -KJ      Highest Level of Mobility Goal 3 --> Sit at edge of bed  -KJ         Functional Assessment    Outcome Measure Options AM-PAC  6 Clicks Basic Mobility (PT)  -AGUSTINA                User Key  (r) = Recorded By, (t) = Taken By, (c) = Cosigned By      Initials Name Provider Type    Emilee Gloria, PTA Physical Therapist Assistant                     Time Calculation:         PT G-Codes  Outcome Measure Options: AM-PAC 6 Clicks Basic Mobility (PT)  AM-PAC 6 Clicks Score (PT): 12  AM-PAC 6 Clicks Score (OT): 13    Angle Nagel, SERENA  7/1/2024

## 2024-07-01 NOTE — PROGRESS NOTES
[facility] Medicine Services  INPATIENT PROGRESS NOTE    Patient Name: Tawny Shin  Date of Admission: 6/12/2024  Today's Date: 07/01/24  Length of Stay: 10  Primary Care Physician: Moises Oliveira MD    Subjective   Chief Complaint: Blood pressure stable, afebrile.  HPI   Lab holiday per infectious disease.  Patient is on room air.  Creatinine normalized.  White blood cells normal.  Hemoglobin stable.    Review of Systems   Constitutional:  Positive for activity change, appetite change and fatigue. Negative for chills and fever.   HENT:  Negative for hearing loss, nosebleeds, tinnitus and trouble swallowing.    Eyes:  Negative for visual disturbance.   Respiratory:  Negative for cough, chest tightness, shortness of breath and wheezing.    Cardiovascular:  Negative for chest pain, palpitations and leg swelling.   Gastrointestinal:  Negative for abdominal distention, abdominal pain, blood in stool, constipation, diarrhea, nausea and vomiting.   Endocrine: Negative for cold intolerance, heat intolerance, polydipsia, polyphagia and polyuria.   Genitourinary:  Negative for decreased urine volume, difficulty urinating, dysuria, flank pain, frequency and hematuria.   Musculoskeletal:  Positive for arthralgias, gait problem and myalgias. Negative for joint swelling.   Skin:  Negative for rash.   Allergic/Immunologic: Negative for immunocompromised state.   Neurological:  Positive for weakness. Negative for dizziness, syncope, light-headedness and headaches.   Hematological:  Negative for adenopathy. Does not bruise/bleed easily.   All pertinent negatives and positives are as above. All other systems have been reviewed and are negative unless otherwise stated.     Objective    Temp:  [97.7 °F (36.5 °C)-98.2 °F (36.8 °C)] 97.7 °F (36.5 °C)  Heart Rate:  [76-83] 77  Resp:  [16] 16  BP: (119-136)/(59-69) 136/65  Physical Exam  Vitals and nursing note reviewed.   Constitutional:      "  Comments: Chronically ill.  Deconditioning.   HENT:      Head: Normocephalic.   Eyes:      Conjunctiva/sclera: Conjunctivae normal.      Pupils: Pupils are equal, round, and reactive to light.   Cardiovascular:      Rate and Rhythm: Normal rate and regular rhythm.      Heart sounds: Normal heart sounds.   Pulmonary:      Effort: Pulmonary effort is normal. No respiratory distress.      Breath sounds: Normal breath sounds.   Abdominal:      General: Bowel sounds are normal. There is no distension.      Palpations: Abdomen is soft.      Comments: Obesity.     Musculoskeletal:         General: No swelling.      Cervical back: Neck supple.   Skin:     General: Skin is warm and dry.      Capillary Refill: Capillary refill takes 2 to 3 seconds.      Findings: No rash.   Neurological:      Mental Status: She is alert and oriented to person, place, and time.      Motor: Weakness present.      Coordination: Coordination abnormal.      Gait: Gait abnormal.   Psychiatric:         Mood and Affect: Mood normal.         Behavior: Behavior normal.         Thought Content: Thought content normal.                Results Review:  I have reviewed the labs, radiology results, and diagnostic studies.    Laboratory Data:   Results from last 7 days   Lab Units 07/01/24 0424 06/30/24  0552 06/29/24 0447   WBC 10*3/mm3 5.42 4.29 4.53   HEMOGLOBIN g/dL 8.5* 8.0* 8.1*   HEMATOCRIT % 29.1* 26.9* 27.2*   PLATELETS 10*3/mm3 194 142 151        Results from last 7 days   Lab Units 07/01/24 0424 06/30/24  0552 06/29/24  0447   SODIUM mmol/L 136 139 138   POTASSIUM mmol/L 4.3 3.8 3.8   CHLORIDE mmol/L 102 105 103   CO2 mmol/L 23.0 26.0 27.0   BUN mg/dL 15 12 13   CREATININE mg/dL 0.91 0.87 1.10*   CALCIUM mg/dL 9.0 8.6 8.7   BILIRUBIN mg/dL  --   --  0.2   ALK PHOS U/L  --   --  86   ALT (SGPT) U/L  --   --  21   AST (SGOT) U/L  --   --  25   GLUCOSE mg/dL 123* 92 92       Culture Data:   No results found for: \"BLOODCX\", \"URINECX\", \"WOUNDCX\", " "\"MRSACX\", \"RESPCX\", \"STOOLCX\"    Radiology Data:   Imaging Results (Last 24 Hours)       ** No results found for the last 24 hours. **            I have reviewed the patient's current medications.     Assessment/Plan   Assessment  Active Hospital Problems    Diagnosis     **Altered mental status     Acute gout     Esophageal dysphagia     UTI (urinary tract infection)     Hydronephrosis with urinary obstruction due to ureteral calculus     Acute cholecystitis     Hypothyroidism     Stage 3b chronic kidney disease     Near functional paraplegia     Cholelithiasis     Essential hypertension     Sick sinus syndrome     Coronary artery disease involving native coronary artery of native heart without angina pectoris     Venous insufficiency     Rheumatoid arthritis involving multiple sites     Chronic heart failure with preserved ejection fraction     Class 1 obesity due to excess calories with serious comorbidity and body mass index (BMI) of 33.0 to 33.9 in adult     Functional neurological symptom disorder with weakness or paralysis     Chronic anticoagulation     Chronic embolism and thrombosis of unspecified deep veins of left lower extremity        Treatment Plan  AMS improved. KS treated.   general surgery to evaluate for cholecystectomy this week    Gout . New problem RLE pain with elevated uric acid level, continue colchicine.  Continue allopurinol.    Recurrent UTI/neurogenic bladder.  Patient finished cefepime/Flagyl antibiotics.  Continue HipRex.  Repeated UA, negative for bacteria.  Consult ID.  Urology consulted and following.   CT scan abdomen pelvic . Obstructive uropathy on the left with mild to moderate dilatation of the upper tracts of the left kidney and left ureter into the true pelvis where there is a 5 x 6 mm calculus within the left ureter approximately 4 to 5 cm above the UVJ- right ureter is decompressed and normal in appearance- No evidence of renal mass. No perinephric fluid collection present, " gallbladder is mildly distended- gallstones within the gallbladder neck,  No pericholecystic inflammatory changes or biliary dilatation, Constipation,  No obstruction or free air, Normal appendix, Calcified fibroid within the lower uterine segment, Mild constipation, No obstruction or free air, Normal appendix, Small fat-containing periumbilical hernia, Previous kyphoplasty at T12, Previous fusion at L3-L4 and L4-L5, Left basilar atelectasis..  Recommendation from urology-evaluation of neurogenic bladder at a university setting like [place].     Obstructing renal calculi left side/moderate dilated of left ureter.  Callus 5 x 6 mm and left 4 to 5 cm above the UVJ junction.  Status post 6/21/2024 urethroscope laser lithotripsy with stent insertion left. Horan in place.     Gallstone, within the gallbladder neck/dysfunctional gallbladder.  Mild distended gallbladder.  Right upper quadrant tenderness.  Ultrasound of the gallbladder-Cholelithiasis with small shadowing stones and sludge within the region of the gallbladder neck- Moderate distention of the gallbladder- however no gallbladder wall thickening or pericholecystic fluid identified- No biliary dilatation.  HIDA scan-Nonvisualization of the gallbladder on images obtained up to 90 minutes- Cystic duct obstruction and acute cholecystitis cannot be excluded on the basis of this exam,  No evidence of common bile duct obstruction with emptying of activity into the C-loop of the duodenum and proximal jejunum, Gallstones and sludge were demonstrated on recent ultrasound- combined with nonvisualization of the gallbladder exclude performance of an ejection fraction..  Discussed with general surgery Dr. Jo-plan for gallbladder removal this coming Tuesday, DC Eliquis today 6/22/2024 start Lovenox today, nursing communication to stop Lovenox 24 hours before surgery on Monday.  Dr. Jo also recommended cardiac clearance for surgery.   General surgery plan to  evaluate cluck cholecystectomy this week.  Cholecystectomy delay due to waxing and waning encephalopathy and concern for further cognitive decline.       Reflux/esophageal dysphagia. Dysphagia recent esophageal stretching, GI evaluated and recommended to continue outpatient follow-up.  SLP evaluated and recommended to continue regular diet.  Protonix . Zofran as needed.     Altered mental status/encephalopathy, improved  Consult neurology  She does not seem to receive anything overnight that could cause worsening mental status changes.  Metronidazole has an incidence of encephalopathy but average is a 28 days and worsening patients with liver disease which makes shortness seem to have at this point.  CT scan the head-No acute intracranial abnormality, Chronic sinusitis and left mastoid effusion.  Repeat this morning.  Medications reviewed  Chest x-ray-Stable chest exam without acute process, No visualized infiltrate.   Patient is on room air.     Rheumatoid arthritis.  Rheumatoid factor quantitative normal.  Arava.  Prednisone.  Lovenox full dose.   Doppler right lower extremity and NISREEN normal.  Pain control.     Hypothyroidism.  Synthroid.     Chronic stage IIIb renal failure.  Creatinine normalized.  Hold Lasix.      Hypomagnesia. Resolved.     Hyponatremia.  Resolved.     Hypokalemia.  Resolved.  Magnesium normal.     Hypertension/sick sinus syndrome/venous insufficiency/CHF.  Hold Eliquis, therapeutic Lovenox.  Aspirin . Coreg . Lasix.  Imdur.  Cozaar . Aldactone.  Nitro as needed.     Right gluteal wound.  Consult wound care.     Rheumatoid arthritis. Arava.     Depression/anxiety.  Cymbalta.     Postmenopausal.  Estradiol cream.     Anemia.  Hemoglobin decreased.  Iron sulfate.  Folic acid.  No sign of acute bleed.     Pain . lidocaine patch.  Voltaren gel.  Ultram as needed.     Chronic DVT.  DC Eliquis due to possible surgery, on Lovenox.     Functional neurological symptom disorder with paraplegia.      Nutrition.  Cardiac diet.  Vitamin C.  Multivitamins.  Regular/house diet.  Boost supplement.     Deconditioning.  PT and OT consult.  Patient use a walker and bedbound at baseline.     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  Last urine culture shows no growth.  Blood culture-no growth for 5 days.     Dr. Shin's mom.  Patient already have home health at home.  Patient request for lift at discharge.     Medical Decision Making  Number and Complexity of problems: Recurrent UTI/altered mental status/reflux/rheumatoid arthritis/chronic 3B renal failure  Differential Diagnosis: None     Conditions and Status        Condition is unchanged.     MDM Data  External documents reviewed: Previous note .  Cardiac tracing (EKG, telemetry) interpretation: Sinus.  Radiology interpretation: X-ray/CT scan of the head  Labs reviewed: Laboratory  Any tests that were considered but not ordered: Lab in a.m.     Decision rules/scores evaluated (example [id number], [place], etc): None     Discussed with: Patient and family     Care Planning  Shared decision making: Patient and family  Code status and discussions: DNR     Disposition  Social Determinants of Health that impact treatment or disposition: From home  1 to 3 days.       Electronically signed by Saulo Ambriz MD, 07/01/24, 07:37 CDT.

## 2024-07-01 NOTE — TELEPHONE ENCOUNTER
----- Message from Albaro Dixon sent at 6/29/2024 10:17 AM CDT -----  Regarding: Hospital follow up  Follow up in 2-3 weeks

## 2024-07-01 NOTE — PLAN OF CARE
Goal Outcome Evaluation:      - alert and oriented x4 / complaints of right leg pain / tramadol x I / VSS

## 2024-07-01 NOTE — PLAN OF CARE
Goal Outcome Evaluation:  Plan of Care Reviewed With: patient        Progress: improving  Outcome Evaluation: Pt alert and oriented this PM. Pt sat EOB with SBA for greater than 20 minutes and completed self feeding and grooming tasks. Pt min/mod A for sit<>supine and SBA for rolling L/R in bed with bed rail. Pt plans to discharge home with caregivers and HH. Continue OT POC

## 2024-07-01 NOTE — THERAPY TREATMENT NOTE
Acute Care - Occupational Therapy Treatment Note  [facility]     Patient Name: Tawny Shin  : 1955  MRN: [id number]  Today's Date: 2024     Date of Referral to OT: 24       Admit Date: 2024       ICD-10-CM ICD-9-CM   1. Acute UTI (urinary tract infection)  N39.0 599.0   2. Altered mental status, unspecified altered mental status type  R41.82 780.97   3. Open wound of right buttock, sequela  S31.819S 906.0   4. Cyst of buttocks  L72.9 706.2   5. Dysphagia, unspecified type  R13.10 787.20   6. Impaired mobility [Z74.09]  Z74.09 799.89   7. Hydronephrosis with urinary obstruction due to ureteral calculus  N13.2 592.1     591   8. Acute cholecystitis  K81.0 575.0   9. Acute congestive heart failure, unspecified heart failure type  I50.9 428.0     Patient Active Problem List   Diagnosis    T12 compression fracture, initial encounter    Chronic embolism and thrombosis of unspecified deep veins of left lower extremity    Chronic anticoagulation    Iron deficiency anemia    Osteoporosis    E coli bacteremia    Epidural hematoma    Pleural effusion, left    Functional neurological symptom disorder with weakness or paralysis    Class 1 obesity due to excess calories with serious comorbidity and body mass index (BMI) of 33.0 to 33.9 in adult    Rheumatoid arthritis involving multiple sites    Chronic heart failure with preserved ejection fraction    Venous insufficiency    Coronary artery disease involving native coronary artery of native heart without angina pectoris    Sick sinus syndrome    Essential hypertension    Pressure injury of skin of heel    Chronic pain    Anemia of chronic disease    Cholelithiasis    Pressure injury of skin of buttock    Near functional paraplegia    Skin cancer    Squamous cell carcinoma of back    Hematoma    Right-sided chest wall pain    Hyperlipidemia LDL goal <70    Malodorous urine    Stage 3b chronic kidney disease    Cyst of buttocks    Sepsis due to  Escherichia coli without acute organ dysfunction    Generalized weakness    Paralysis    History of urinary retention    Bacteremia due to Enterobacter species    Bacteremia    Hypothyroidism    Abnormal CT of the abdomen    Presence of cardiac pacemaker    Altered mental status    UTI (urinary tract infection)    Esophageal dysphagia    Hydronephrosis with urinary obstruction due to ureteral calculus    Acute cholecystitis    Acute gout     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    Elevated cholesterol     Eustachian tube dysfunction     Heart disease     Herpes simplex     History of transfusion     Hyperlipidemia     Hypertension     Hypothyroidism 12/23/2020    Intrinsic asthma 12/23/2020    Knee dislocation     Labral tear of right hip joint     Laryngitis sicca     Laryngitis, chronic     Left carotid bruit 03/09/2016    MI (myocardial infarction)     Myalgia due to statin 06/25/2019    Open wound of right hip 09/14/2021    Osteomyelitis of right femur 07/06/2021    Otorrhea     Pacemaker 11/17/2016    Primary osteoarthritis of left knee 12/23/2020    Psoriasis vulgaris 12/23/2020    S/P coronary artery stent placement 03/09/2016    Sensorineural hearing loss     Seropositive rheumatoid arthritis of multiple sites 12/27/2019    Overview:  -myochrysine '93-'96 -methotrexate '96--->11/98;r/s  restarted 2/99--> 8/14 (anemia) -sulfasalazine- not effective -penicillamine 6/98-->10/98; no effect  "-leflunomide 11/98--> - Humira '13-->didn't take - Enbrel 12/14-->3/15- no effect!   Last Assessment & Plan:  - \"aching all over\" because she had to be off her anti-rheumatic drugs for 2 weeks in preparation for her R knee surgery - he    Sick sinus syndrome 12/27/2019    Sjogren's disease     Spondylolisthesis of lumbar region 01/17/2018    Syncope, recurrent 02/08/2021    Urinary tract infection     UTI (urinary tract infection) 04/19/2024     Past Surgical History:   Procedure Laterality Date    A-V CARDIAC PACEMAKER INSERTION  2016    ATRIAL CARDIAC PACEMAKER INSERTION      CARDIAC CATHETERIZATION      CATARACT EXTRACTION      CERVICAL CORPECTOMY N/A 3/3/2021    Procedure: CERVICAL 6 CORPECTOMY WITH TITANIUM CAGE WITH NEURO MONITORING;  Surgeon: Bandar Shea MD;  Location: [facility] OR;  Service: Neurosurgery;  Laterality: N/A;    COLONOSCOPY  11/08/2011    One fold in the ascending colon which showed ulcer otherwise normal exam    COLONOSCOPY  11/12/2004    Normal exam repeat in five years    CORONARY ANGIOPLASTY WITH STENT PLACEMENT      X 2; 2013 & 2014    ENDOSCOPY  07/10/2014    Normal exam    ENDOSCOPY N/A 5/30/2024    Procedure: ESOPHAGOGASTRODUODENOSCOPY WITH ANESTHESIA;  Surgeon: Taiwo Sanchez MD;  Location: [facility] ENDOSCOPY;  Service: Gastroenterology;  Laterality: N/A;  preop; dysphagia  postop: balloon dilation  PCP Jesus Manuel jeff    FLAP LEG Right 9/14/2021    Procedure: RIGHT GLUTEAL FASCIOCUTANEOUS ADVANCEMENT FLAP AND RIGHT TENSOR FASCIAL JESSICA FLAP;  Surgeon: Amadeo Turner MD;  Location: [facility] OR;  Service: Plastics;  Laterality: Right;    HIP ABDUCTION TENOTOMY BILATERAL Right 1/14/2021    Procedure: RIGHT HIP GLUTEUS MEDLUS / MINIMUS REPAIR, POSSIBLE ACHILLES ALLOGRAFT;  Surgeon: Nino Carlson MD;  Location: [facility] OR;  Service: Orthopedics;  Laterality: Right;    INCISION AND DRAINAGE ABSCESS Right 6/4/2022    Procedure: INCISION AND DRAINAGE ABSCESS right hip;  Surgeon: Omari" Magda BECKMAN MD;  Location:  [facility] OR;  Service: General;  Laterality: Right;    INCISION AND DRAINAGE ABSCESS Right 6/10/2022    Procedure: RIGHT HIP INCISION AND DRAINAGE. MD NEEDS 3L VANC IRRIGATION, CURRLORIN, DAICANS, KERLEX ROLLS;  Surgeon: Amadeo Turner MD;  Location:  [facility] OR;  Service: Plastics;  Laterality: Right;    INCISION AND DRAINAGE HIP Right 2/9/2021    Procedure: HIP INCISION AND DRAINAGE;  Surgeon: Nino Carlson MD;  Location:  [facility] OR;  Service: Orthopedics;  Laterality: Right;    INCISION AND DRAINAGE LEG Right 10/24/2021    Procedure: INCISION AND DRAINAGE LOWER EXTREMITY;  Surgeon: Amadeo Turner MD;  Location:  [facility] OR;  Service: Plastics;  Laterality: Right;    INCISION AND DRAINAGE OF WOUND Right 7/8/2021    Procedure: INCISION AND DRAINAGE WOUND RIGHT HIP;  Surgeon: James Huntley MD;  Location:  [facility] OR;  Service: Orthopedics;  Laterality: Right;    JOINT REPLACEMENT      KYPHOPLASTY WITH BIOPSY Bilateral 10/26/2021    Procedure: THOARCIC 12 KYPHOPLASTY WITH BIOPSY;  Surgeon: Bandar Shea MD;  Location:  [facility] OR;  Service: Neurosurgery;  Laterality: Bilateral;    LEG DEBRIDEMENT Right 9/14/2021    Procedure: DEBRIDEMENT OF RIGHT HIP WOUND, RIGHT GLUTEAL FASCIOCUTANEOUS ADVANCEMENT FLAP AND RIGHT TENSOR FASCIAL JESSICA FLAP;  Surgeon: Amadeo Turner MD;  Location:  [facility] OR;  Service: Plastics;  Laterality: Right;    LUMBAR DISCECTOMY Right 3/23/2021    Procedure: LUMBAR DISCECTOMY MICRO, Lumbar 1/2 right;  Surgeon: Bandar Shea MD;  Location:  [facility] OR;  Service: Neurosurgery;  Laterality: Right;    LUMBAR FUSION N/A 1/19/2018    Procedure: L3-4,L4-5 DECOMPRESSION, POSTERIOR SPINAL FUSION WITH INSTRUMENTATION;  Surgeon: Fortino Oropeza MD;  Location:  [facility] OR;  Service:     LUMBAR LAMINECTOMY WITH FUSION Left 1/17/2018    Procedure: LEFT L3-4 L4-5 LATERAL LUMBAR INTERBODY FUSION;  Surgeon: Fortino Oropeza MD;  Location:  [facility] OR;  Service:      MASS EXCISION Right 4/23/2024    Procedure: RIGHT BUTTOCK MASS EXCISION;  Surgeon: Moises Keyes MD;  Location:  [facility] OR;  Service: General;  Laterality: Right;    MYRINGOTOMY W/ TUBES  09/04/2014    TUBES NO LONGER IN PLACE    OTHER SURGICAL HISTORY      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 so hardware was removed and spacers were placed    REPLACEMENT TOTAL KNEE Right     URETEROSCOPY LASER LITHOTRIPSY WITH STENT INSERTION N/A 6/21/2024    Procedure: URETEROSCOPY LASER LITHOTRIPSY WITH STENT INSERTION LEFT;  Surgeon: Ky Plascencia MD;  Location:  [facility] OR;  Service: Urology;  Laterality: N/A;         OT ASSESSMENT FLOWSHEET (Last 12 Hours)       OT Evaluation and Treatment       Row Name 07/01/24 1315                   OT Time and Intention    Subjective Information complains of;fatigue  -TS        Document Type therapy note (daily note)  -TS        Mode of Treatment occupational therapy  -TS        Patient Effort good  -TS           General Information    Existing Precautions/Restrictions fall  -TS           Pain Assessment    Pretreatment Pain Rating 0/10 - no pain  -TS        Posttreatment Pain Rating 0/10 - no pain  -TS           Cognition    Orientation Status (Cognition) oriented x 4  -TS        Personal Safety Interventions fall prevention program maintained;nonskid shoes/slippers when out of bed  -TS           Activities of Daily Living    BADL Assessment/Intervention grooming;feeding  -TS           Grooming Assessment/Training    [place] Level (Grooming) oral care regimen;set up;standby assist  -TS        Oral Care teeth brushed - regular toothbrush  -TS        Position (Grooming) edge of bed sitting  -TS           Self-Feeding Assessment/Training    [place] Level (Feeding) liquids to mouth;feeding skills;set up;supervision  -TS        Position (Self-Feeding) edge of bed sitting  -TS           Bed Mobility    Rolling Left [place] (Bed Mobility) standby assist  -TS        Rolling Right  [place] (Bed Mobility) standby assist  -TS        Scooting/Bridging [place] (Bed Mobility) moderate assist (50% patient effort);2 person assist  -TS        Supine-Sit [place] (Bed Mobility) minimum assist (75% patient effort);moderate assist (50% patient effort)  -TS        Sit-Supine [place] (Bed Mobility) minimum assist (75% patient effort)  -TS           Wound 04/23/24 0936 Right gluteal Incision    Wound - Properties Group Placement Date: 04/23/24  -EP Placement Time: 0936  -EP Present on Original Admission: N  -EP Side: Right  -EP Location: gluteal  -EP Primary Wound Type: Incision  -EP    Retired Wound - Properties Group Placement Date: 04/23/24  -EP Placement Time: 0936  -EP Present on Original Admission: N  -EP Side: Right  -EP Location: gluteal  -EP Primary Wound Type: Incision  -EP    Retired Wound - Properties Group Date first assessed: 04/23/24  -EP Time first assessed: 0936  -EP Present on Original Admission: N  -EP Side: Right  -EP Location: gluteal  -EP Primary Wound Type: Incision  -EP       Plan of Care Review    Plan of Care Reviewed With patient  -TS        Progress improving  -TS        Outcome Evaluation Pt alert and oriented this PM. Pt sat EOB with SBA for greater than 20 minutes and completed self feeding and grooming tasks. Pt min/mod A for sit<>supine and SBA for rolling L/R in bed with bed rail. Pt plans to discharge home with caregivers and HH. Continue OT POC  -TS           Positioning and Restraints    Pre-Treatment Position in bed  -TS        Post Treatment Position bed  -TS        In Bed fowlers;call light within reach;encouraged to call for assist;with family/caregiver;side rails up x3;heels elevated  -TS                  User Key  (r) = Recorded By, (t) = Taken By, (c) = Cosigned By      Initials Name Effective Dates    TS Carolee Giordano COTA 02/03/23 -     EP Sonny Rivero RN 01/22/24 -                      Occupational Therapy Education       Title: PT  OT SLP Therapies (Done)       Topic: Occupational Therapy (Done)       Point: ADL training (Done)       Description:   Instruct learner(s) on proper safety adaptation and remediation techniques during self care or transfers.   Instruct in proper use of assistive devices.                  Learning Progress Summary             Patient Acceptance, E, VU,NR by LS at 6/28/2024 1110    Acceptance, E, VU by KS at 6/20/2024 1715    Acceptance, E, VU by EC at 6/20/2024 1558    Acceptance, E, VU by EC at 6/17/2024 1525   Caregiver Acceptance, E, VU by EC at 6/20/2024 1558                         Point: Home exercise program (Done)       Description:   Instruct learner(s) on appropriate technique for monitoring, assisting and/or progressing therapeutic exercises/activities.                  Learning Progress Summary             Patient Acceptance, E, VU by KS at 6/20/2024 1715    Acceptance, E, VU by EC at 6/20/2024 1558   Caregiver Acceptance, E, VU by EC at 6/20/2024 1558                         Point: Precautions (Done)       Description:   Instruct learner(s) on prescribed precautions during self-care and functional transfers.                  Learning Progress Summary             Patient Acceptance, E, VU,NR by LS at 6/28/2024 1110    Acceptance, E, VU by KS at 6/20/2024 1715    Acceptance, E, VU,NR by LS at 6/19/2024 1136    Acceptance, E, VU by EC at 6/17/2024 1525                         Point: Body mechanics (Done)       Description:   Instruct learner(s) on proper positioning and spine alignment during self-care, functional mobility activities and/or exercises.                  Learning Progress Summary             Patient Acceptance, E, VU,NR by LS at 6/28/2024 1110    Acceptance, E, VU by KS at 6/20/2024 1715    Acceptance, E, VU by EC at 6/20/2024 1558    Acceptance, E, VU,NR by LS at 6/19/2024 1136    Acceptance, E, VU by EC at 6/17/2024 1525   Caregiver Acceptance, E, VU by EC at 6/20/2024 1558                                          User Key       Initials Effective Dates Name Provider Type Discipline    KS 02/27/23 -  Ewelina Morales RN Registered Nurse Nurse    LS 06/20/22 -  Bianca Mcgarry, OTR/L Occupational Therapist OT    EC 10/13/23 -  Ashley Martino, OTR/L Occupational Therapist OT                      OT Recommendation and Plan     Plan of Care Review  Plan of Care Reviewed With: patient  Progress: improving  Outcome Evaluation: Pt alert and oriented this PM. Pt sat EOB with SBA for greater than 20 minutes and completed self feeding and grooming tasks. Pt min/mod A for sit<>supine and SBA for rolling L/R in bed with bed rail. Pt plans to discharge home with caregivers and HH. Continue OT POC  Plan of Care Reviewed With: patient  Outcome Evaluation: Pt alert and oriented this PM. Pt sat EOB with SBA for greater than 20 minutes and completed self feeding and grooming tasks. Pt min/mod A for sit<>supine and SBA for rolling L/R in bed with bed rail. Pt plans to discharge home with caregivers and HH. Continue OT POC     Outcome Measures       Row Name 06/29/24 1100             How much help from another person do you currently need...    Turning from your back to your side while in flat bed without using bedrails? 3  -KJ      Moving from lying on back to sitting on the side of a flat bed without bedrails? 2  -KJ      Moving to and from a bed to a chair (including a wheelchair)? 1  -KJ      Standing up from a chair using your arms (e.g., wheelchair, bedside chair)? 1  -KJ      Climbing 3-5 steps with a railing? 1  -KJ      To walk in hospital room? 1  -KJ      AM-PAC 6 Clicks Score (PT) 9  -KJ      Highest Level of Mobility Goal 3 --> Sit at edge of bed  -KJ         Functional Assessment    Outcome Measure Options AM-PAC 6 Clicks Basic Mobility (PT)  -KJ                User Key  (r) = Recorded By, (t) = Taken By, (c) = Cosigned By      Initials Name Provider Type    Emilee Gloria, PTA Physical Therapist  Assistant                    Time Calculation:    Time Calculation- OT       Row Name 07/01/24 1505             Time Calculation- OT    OT Start Time 1315  -TS      OT Stop Time 1409  -TS      OT Time Calculation (min) 54 min  -TS      Total Timed Code Minutes- OT 54 minute(s)  -TS      OT Received On 07/01/24  -TS         Timed Charges    62213 - OT Self Care/Mgmt Minutes 54  -TS         Total Minutes    Timed Charges Total Minutes 54  -TS       Total Minutes 54  -TS                User Key  (r) = Recorded By, (t) = Taken By, (c) = Cosigned By      Initials Name Provider Type    TS Carolee Giordano COTA Occupational Therapist Assistant                  Therapy Charges for Today       Code Description Service Date Service Provider Modifiers Qty    [id number] HC OT SELF CARE/MGMT/TRAIN EA 15 MIN 7/1/2024 Carolee Giordano COTA GO 4                 Carolee NIELSON. GALLITO Giordano  7/1/2024

## 2024-07-01 NOTE — PROGRESS NOTES
"INFECTIOUS DISEASES PROGRESS NOTE    Patient:  Tawny Shin  YOB: 1955  MRN: [id number]   Admit date: 6/12/2024   Admitting Physician: Saulo Ambriz MD  Primary Care Physician: Moises Oliveira MD    Chief Complaint: \"I think you all are playing games\"      Interval History: Patient indicated that nobody seems to know about surgery.  There was comments last week that she might go for gallbladder surgery Monday and now someone else at Tuesday.  She said nursing checked last night and there was nothing in the chart and nothing about keeping her n.p.o.     Did reassure her that we were not playing games and she seemed to laugh it off.  I just explained to her that we are limited by what we can see in the chart.      Allergies:   Allergies   Allergen Reactions    Atorvastatin Other (See Comments)     LEG CRAMPS      Amoxicillin Rash    Escitalopram Rash    Nabumetone Rash    Niacin Er Rash    Penicillin G Rash    Penicillins Rash    Simvastatin Rash       Current Scheduled Medications:   allopurinol, 100 mg, Oral, Daily  ascorbic acid, 500 mg, Oral, Daily  aspirin, 81 mg, Oral, Daily  carvedilol, 25 mg, Oral, BID With Meals  colchicine, 0.6 mg, Oral, Daily  DULoxetine, 60 mg, Oral, Daily  enoxaparin, 1 mg/kg, Subcutaneous, Q12H  estradiol, 2 g, Vaginal, Once per day on Monday Thursday  ferrous sulfate, 325 mg, Oral, Daily With Breakfast  folic acid, 1,000 mcg, Oral, Daily  [Held by provider] furosemide, 40 mg, Oral, Daily  isosorbide mononitrate, 60 mg, Oral, Daily  leflunomide, 20 mg, Oral, Daily  levothyroxine, 75 mcg, Oral, Q AM  losartan, 50 mg, Oral, Daily  methenamine, 1 g, Oral, BID  multivitamin with minerals, 1 tablet, Oral, Daily  pantoprazole, 40 mg, Oral, Daily  predniSONE, 20 mg, Oral, Daily With Breakfast  sodium chloride, 10 mL, Intravenous, Q12H  spironolactone, 25 mg, Oral, Daily      Current PRN Medications:    acetaminophen    senna-docusate sodium **AND** polyethylene glycol " "**AND** bisacodyl **AND** bisacodyl    Calcium Replacement - Follow Nurse / BPA Driven Protocol    Diclofenac Sodium    guaifenesin    Lidocaine    Magnesium Low Dose Replacement - Follow Nurse / BPA Driven Protocol    [DISCONTINUED] Morphine **AND** naloxone    nitroglycerin    ondansetron    Phosphorus Replacement - Follow Nurse / BPA Driven Protocol    Potassium Replacement - Follow Nurse / BPA Driven Protocol    sodium chloride    sodium chloride    sodium chloride    traMADol              Objective     Vital Signs:  Temp (24hrs), Av.9 °F (36.6 °C), Min:97.7 °F (36.5 °C), Max:98.2 °F (36.8 °C)      /64 (BP Location: Left arm, Patient Position: Lying)   Pulse 70   Temp 97.7 °F (36.5 °C) (Oral)   Resp 16   Ht 167.6 cm (66\")   Wt 94.8 kg (209 lb)   LMP  (LMP Unknown)   SpO2 96%   BMI 33.73 kg/m²         Physical Exam:    General: The patient is alert, lying in bed in no acute distress.  Caregiver at bedside  Respiratory: Effort pretty even and unlabored, she not conversationally dyspneic  Right lower extremity essentially stable.  She has tenderness to palpation involving the entire leg.  Pale erythema distally without any worsening.      Results Review:    I reviewed the patient's new clinical results.    Lab Results:    CBC:   Lab Results   Lab 24  0503 24  0338 24  0556 24  0447 24  0552 24  0424   WBC 4.18 4.39 4.86 4.53 4.29 5.42   HEMOGLOBIN 7.4* 7.2* 8.1* 8.1* 8.0* 8.5*   HEMATOCRIT 24.6* 23.8* 26.3* 27.2* 26.9* 29.1*   PLATELETS 151 150 160 151 142 194        AutoDiff:            Manual Diff:          Invalid input(s): \"MONABS\"          CMP:   Lab Results   Lab 24  0447 24  0552 24  0424   SODIUM 138 139 136   POTASSIUM 3.8 3.8 4.3   CHLORIDE 103 105 102   CO2 27.0 26.0 23.0   BUN 13 12 15   CREATININE 1.10* 0.87 0.91   CALCIUM 8.7 8.6 9.0   BILIRUBIN 0.2  --   --    ALK PHOS 86  --   --    ALT (SGPT) 21  --   --    AST (SGOT) 25  --   " --    GLUCOSE 92 92 123*       Estimated Creatinine Clearance: 66.7 mL/min (by C-G formula based on SCr of 0.91 mg/dL).    Culture Results:    Microbiology Results (last 10 days)       Procedure Component Value - Date/Time    Urine Culture - Urine, Urine, Catheter [[id number]]  (Normal) Collected: 06/21/24 1116    Lab Status: Final result Specimen: Urine, Catheter Updated: 06/22/24 1152     Urine Culture No growth             C-Reactive Protein   Date Value Ref Range Status   06/29/2024 4.04 (H) 0.00 - 0.50 mg/dL Final   07/18/2022 3.34 (H) 0.00 - 0.50 mg/dL Final   07/15/2022 2.92 (H) 0.00 - 0.50 mg/dL Final     Sed Rate   Date Value Ref Range Status   07/05/2022 75 (H) 0 - 30 mm/hr Final   06/30/2022 72 (H) 0 - 30 mm/hr Final   06/27/2022 38 (H) 0 - 30 mm/hr Final       Uric acid 6.2      Radiology:         Imaging Results (Last 72 Hours)       Procedure Component Value Units Date/Time    US Venous Doppler Lower Extremity Right (duplex) [[id number]] Collected: 06/28/24 1432     Updated: 06/28/24 1435    Narrative:      History: Pain and swelling       Impression:      Impression: There is no evidence of deep venous thrombosis or  superficial thrombophlebitis of the right lower extremity.     Comments: Right lower extremity venous duplex exam was performed using  color Doppler flow, Doppler wave form analysis, and grayscale imaging,  with and without compression. There is no evidence of deep venous  thrombosis of the common femoral, superficial femoral, popliteal,  posterior tibial, and peroneal veins. There is no thrombus identified in  the saphenofemoral junction or the greater saphenous vein.         This report was signed and finalized on 6/28/2024 2:32 PM by Dr. Kannan Mcgraw MD.       US Ankle / Brachial Indices Extremity Complete [[id number]] Collected: 06/28/24 1354     Updated: 06/28/24 1359    Narrative:         History: PAD     Comments: Bilateral lower extremity arterial with multi-level pulse  volume  recordings and segmental pressures were performed at rest and  stress.     The right ankle/brachial index is 1.45. The waveforms are biphasic  without dampening. These findings are consistent with no significant  arterial insufficiency of the right lower extremity at rest.     The left ankle/brachial index is not obtainable due to  noncompressibility of the vessels. The waveforms are biphasic without  dampening.        Impression:      Impression:  1. No significant arterial insufficiency of the right lower extremity at  rest.  2. The left ankle/brachial index was not obtainable due to  noncompressibility of the vessels.         This report was signed and finalized on 6/28/2024 1:55 PM by Dr. Kannan Mcgraw MD.                   Active Hospital Problems    Diagnosis     **Altered mental status     Acute gout     Esophageal dysphagia     UTI (urinary tract infection)     Hydronephrosis with urinary obstruction due to ureteral calculus     Acute cholecystitis     Hypothyroidism     Stage 3b chronic kidney disease     Near functional paraplegia     Cholelithiasis     Essential hypertension     Sick sinus syndrome     Coronary artery disease involving native coronary artery of native heart without angina pectoris     Venous insufficiency     Rheumatoid arthritis involving multiple sites     Chronic heart failure with preserved ejection fraction     Class 1 obesity due to excess calories with serious comorbidity and body mass index (BMI) of 33.0 to 33.9 in adult     Functional neurological symptom disorder with weakness or paralysis     Chronic anticoagulation     Chronic embolism and thrombosis of unspecified deep veins of left lower extremity        IMPRESSION:  Recurrent urinary tract infections in patient admitted with Carbapenem resistant Enterobacter cloacae isolated from urine.  Patient completed a course of cefepime to which it was susceptible.   Obstructive left ureteral stone status post cystoscopy and left  ureteral stent placement on June 21 Dr. Plascencia.  Indwelling catheter and stent removed 6/29  Right lower extremity edema with some pale erythema.  No DVT noted. Uric acid elevated.  Started on allopurinol per hospitalist  Cholelithiasis with nonvisualization of gallbladder on HIDA.  No orders for surgery or n.p.o. status noted.  Nursing staff not aware of plans for surgery.  Patient did indicate Dr. Myers he mention possibly Tuesday  Increasing creatinine-creatinine improved with IV hydration for 1 day and holding Lasix.  Patient with poor p.o. 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  Continue local wound care to right gluteal wound  Awaiting input from general surgery regarding possible cholecystectomy          Maggie Bang MD  07/01/24  09:27 CDT

## 2024-07-02 PROCEDURE — 25010000002 ENOXAPARIN PER 10 MG: Performed by: FAMILY MEDICINE

## 2024-07-02 PROCEDURE — 97535 SELF CARE MNGMENT TRAINING: CPT

## 2024-07-02 PROCEDURE — 63710000001 PREDNISONE PER 1 MG: Performed by: FAMILY MEDICINE

## 2024-07-02 PROCEDURE — 99232 SBSQ HOSP IP/OBS MODERATE 35: CPT | Performed by: SURGERY

## 2024-07-02 PROCEDURE — 97530 THERAPEUTIC ACTIVITIES: CPT

## 2024-07-02 PROCEDURE — 99231 SBSQ HOSP IP/OBS SF/LOW 25: CPT | Performed by: INTERNAL MEDICINE

## 2024-07-02 RX ADMIN — SPIRONOLACTONE 25 MG: 25 TABLET ORAL at 09:41

## 2024-07-02 RX ADMIN — CARVEDILOL 25 MG: 25 TABLET, FILM COATED ORAL at 09:34

## 2024-07-02 RX ADMIN — LEVOTHYROXINE SODIUM 75 MCG: 0.07 TABLET ORAL at 05:46

## 2024-07-02 RX ADMIN — ASPIRIN 81 MG: 81 TABLET, COATED ORAL at 09:32

## 2024-07-02 RX ADMIN — TRAMADOL HYDROCHLORIDE 50 MG: 50 TABLET ORAL at 20:42

## 2024-07-02 RX ADMIN — ALLOPURINOL 100 MG: 100 TABLET ORAL at 09:40

## 2024-07-02 RX ADMIN — LOSARTAN POTASSIUM 50 MG: 50 TABLET, FILM COATED ORAL at 09:36

## 2024-07-02 RX ADMIN — COLCHICINE 0.6 MG: 0.6 TABLET ORAL at 09:40

## 2024-07-02 RX ADMIN — FERROUS SULFATE TAB 325 MG (65 MG ELEMENTAL FE) 325 MG: 325 (65 FE) TAB at 09:37

## 2024-07-02 RX ADMIN — GUAIFENESIN 200 MG: 200 SOLUTION ORAL at 20:43

## 2024-07-02 RX ADMIN — DULOXETINE HYDROCHLORIDE 60 MG: 30 CAPSULE, DELAYED RELEASE ORAL at 09:39

## 2024-07-02 RX ADMIN — Medication 10 ML: at 09:44

## 2024-07-02 RX ADMIN — ISOSORBIDE MONONITRATE 60 MG: 60 TABLET, EXTENDED RELEASE ORAL at 09:38

## 2024-07-02 RX ADMIN — FOLIC ACID 1000 MCG: 1 TABLET ORAL at 09:36

## 2024-07-02 RX ADMIN — CARVEDILOL 25 MG: 25 TABLET, FILM COATED ORAL at 18:53

## 2024-07-02 RX ADMIN — PREDNISONE 20 MG: 20 TABLET ORAL at 09:37

## 2024-07-02 RX ADMIN — METHENAMINE HIPPURATE 1 G: 1000 TABLET ORAL at 20:42

## 2024-07-02 RX ADMIN — ENOXAPARIN SODIUM 90 MG: 100 INJECTION SUBCUTANEOUS at 05:46

## 2024-07-02 RX ADMIN — METHENAMINE HIPPURATE 1 G: 1000 TABLET ORAL at 09:50

## 2024-07-02 RX ADMIN — Medication 1 TABLET: at 09:38

## 2024-07-02 RX ADMIN — OXYCODONE HYDROCHLORIDE AND ACETAMINOPHEN 500 MG: 500 TABLET ORAL at 09:34

## 2024-07-02 RX ADMIN — PANTOPRAZOLE SODIUM 40 MG: 40 TABLET, DELAYED RELEASE ORAL at 09:32

## 2024-07-02 RX ADMIN — LEFLUNOMIDE 20 MG: 20 TABLET ORAL at 09:42

## 2024-07-02 RX ADMIN — Medication 10 ML: at 20:43

## 2024-07-02 NOTE — PROGRESS NOTES
"GENERAL SURGERY INPATIENT PROGRESS NOTE    Patient Name:  Tawny Shin  YOB: 1955  MRN: [id number]    SUBJECTIVE    Feeling a little better today.  Having some nausea but able to tolerate food.  Mild right upper quadrant abdominal pain.  Mental status has improved    Allergies:  Allergies   Allergen Reactions    Atorvastatin Other (See Comments)     LEG CRAMPS      Amoxicillin Rash    Escitalopram Rash    Nabumetone Rash    Niacin Er Rash    Penicillin G Rash    Penicillins Rash    Simvastatin Rash       Medications:  allopurinol, 100 mg, Oral, Daily  ascorbic acid, 500 mg, Oral, Daily  aspirin, 81 mg, Oral, Daily  carvedilol, 25 mg, Oral, BID With Meals  colchicine, 0.6 mg, Oral, Daily  DULoxetine, 60 mg, Oral, Daily  [Held by provider] enoxaparin, 1 mg/kg, Subcutaneous, Q12H  estradiol, 2 g, Vaginal, Once per day on Monday Thursday  ferrous sulfate, 325 mg, Oral, Daily With Breakfast  folic acid, 1,000 mcg, Oral, Daily  [Held by provider] furosemide, 40 mg, Oral, Daily  isosorbide mononitrate, 60 mg, Oral, Daily  leflunomide, 20 mg, Oral, Daily  levothyroxine, 75 mcg, Oral, Q AM  losartan, 50 mg, Oral, Daily  methenamine, 1 g, Oral, BID  multivitamin with minerals, 1 tablet, Oral, Daily  pantoprazole, 40 mg, Oral, Daily  predniSONE, 20 mg, Oral, Daily With Breakfast  sodium chloride, 10 mL, Intravenous, Q12H  spironolactone, 25 mg, Oral, Daily         OBJECTIVE    VITAL SIGNS  /55 (BP Location: Right arm, Patient Position: Lying)   Pulse 75   Temp 97.7 °F (36.5 °C) (Oral)   Resp 16   Ht 167.6 cm (66\")   Wt 94.8 kg (209 lb)   LMP  (LMP Unknown)   SpO2 97%   BMI 33.73 kg/m²     Intake/Output Summary (Last 24 hours) at 7/2/2024 0150  Last data filed at 7/2/2024 0952  Gross per 24 hour   Intake 270 ml   Output --   Net 270 ml       PHYSICAL EXAM    General: Elderly female, sitting up in bed, in no acute distress.  HEENT:  NCAT, PERRL, EOM intact bilaterally, hearing intact, nares " patent  Heart:  Regular rate, normotensive, no JVD bilaterally  Lungs:  Normal respiratory effort, regular respiratory rate, no wheezing  Abdomen: Obese abdomen, tender to the right upper quadrant and right flank  Extremities:  No cyanosis, clubbing or edema.   Musculoskeletal:  Normal development of bilateral upper extremities. Normal development of bilateral lower extremities.  Range of motion intact.  No evidence of trauma.  Skin:  No rashes, ecchymosis or jaundice. Dry and non-diaphoretic. Skin color is consistent with ethnicity.    RESULTS    Labs:  I personally reviewed all pertinent labs.   Imaging:  I personally reviewed all pertinent imaging studies.   Pathology:        ASSESSMENT AND PLAN    Active Hospital Problems    Diagnosis     **Altered mental status     Acute gout     Esophageal dysphagia     UTI (urinary tract infection)     Hydronephrosis with urinary obstruction due to ureteral calculus     Acute cholecystitis     Hypothyroidism     Stage 3b chronic kidney disease     Near functional paraplegia     Cholelithiasis     Essential hypertension     Sick sinus syndrome     Coronary artery disease involving native coronary artery of native heart without angina pectoris     Venous insufficiency     Rheumatoid arthritis involving multiple sites     Chronic heart failure with preserved ejection fraction     Class 1 obesity due to excess calories with serious comorbidity and body mass index (BMI) of 33.0 to 33.9 in adult     Functional neurological symptom disorder with weakness or paralysis     Chronic anticoagulation     Chronic embolism and thrombosis of unspecified deep veins of left lower extremity          DAILY CARE PLAN    Gallstones with a HIDA scan that shows obstruction of the cystic duct.  This is representative of chronic calculus cholecystitis with obstruction.  She has clinically improved from encephalopathy after being treated for sepsis.  I have discussed this with her son, Dr. Casa Shin,  who believes that she is most medically maximized she can be at this time.  The patient would like to proceed with surgery.    Plan for laparoscopic cholecystectomy with intraoperative cholangiogram tomorrow.  N.p.o. after midnight.  I will hold her evening dose of therapeutic Lovenox along with her a.m. dose tomorrow.    I discussed the risks, benefits and alternatives to cholecystectomy including risk of injury to surrounding structures specifically the bile duct, postoperative bile leaks, deep organ space and superficial skin infection, uncontrolled bleeding, the need for blood transfusion, conversion to open surgery, incisional hernias, ongoing pain, bowel habit changes, long term drain and wound care, and the possibililty of requiring further operations or procedures including endoscopy. Additionally, I counseled the patient on the risk of postoperative cardiopulmonary complications. I gave the patient a chance to ask any questions and answered them thoroughly. The patient understood the risks and was agreeable to proceeding to surgery. Consent was obtained from the patient.    I discussed the patient's findings and my recommendations with the patient/family, as well as the primary care team.    Moises Keyes MD, [facility]  General Surgery  7/2/2024  18:36 CDT    Please note that portions of this note were completed with a voice recognition program.

## 2024-07-02 NOTE — THERAPY TREATMENT NOTE
Acute Care - Physical Therapy Treatment Note  [facility]     Patient Name: Tawny Shin  : 1955  MRN: [id number]  Today's Date: 2024      Visit Dx:     ICD-10-CM ICD-9-CM   1. Acute UTI (urinary tract infection)  N39.0 599.0   2. Altered mental status, unspecified altered mental status type  R41.82 780.97   3. Open wound of right buttock, sequela  S31.819S 906.0   4. Cyst of buttocks  L72.9 706.2   5. Dysphagia, unspecified type  R13.10 787.20   6. Impaired mobility [Z74.09]  Z74.09 799.89   7. Hydronephrosis with urinary obstruction due to ureteral calculus  N13.2 592.1     591   8. Acute cholecystitis  K81.0 575.0   9. Acute congestive heart failure, unspecified heart failure type  I50.9 428.0     Patient Active Problem List   Diagnosis    T12 compression fracture, initial encounter    Chronic embolism and thrombosis of unspecified deep veins of left lower extremity    Chronic anticoagulation    Iron deficiency anemia    Osteoporosis    E coli bacteremia    Epidural hematoma    Pleural effusion, left    Functional neurological symptom disorder with weakness or paralysis    Class 1 obesity due to excess calories with serious comorbidity and body mass index (BMI) of 33.0 to 33.9 in adult    Rheumatoid arthritis involving multiple sites    Chronic heart failure with preserved ejection fraction    Venous insufficiency    Coronary artery disease involving native coronary artery of native heart without angina pectoris    Sick sinus syndrome    Essential hypertension    Pressure injury of skin of heel    Chronic pain    Anemia of chronic disease    Cholelithiasis    Pressure injury of skin of buttock    Near functional paraplegia    Skin cancer    Squamous cell carcinoma of back    Hematoma    Right-sided chest wall pain    Hyperlipidemia LDL goal <70    Malodorous urine    Stage 3b chronic kidney disease    Cyst of buttocks    Sepsis due to Escherichia coli without acute organ dysfunction    Generalized  weakness    Paralysis    History of urinary retention    Bacteremia due to Enterobacter species    Bacteremia    Hypothyroidism    Abnormal CT of the abdomen    Presence of cardiac pacemaker    Altered mental status    UTI (urinary tract infection)    Esophageal dysphagia    Hydronephrosis with urinary obstruction due to ureteral calculus    Acute cholecystitis    Acute gout     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    Elevated cholesterol     Eustachian tube dysfunction     Heart disease     Herpes simplex     History of transfusion     Hyperlipidemia     Hypertension     Hypothyroidism 12/23/2020    Intrinsic asthma 12/23/2020    Knee dislocation     Labral tear of right hip joint     Laryngitis sicca     Laryngitis, chronic     Left carotid bruit 03/09/2016    MI (myocardial infarction)     Myalgia due to statin 06/25/2019    Open wound of right hip 09/14/2021    Osteomyelitis of right femur 07/06/2021    Otorrhea     Pacemaker 11/17/2016    Primary osteoarthritis of left knee 12/23/2020    Psoriasis vulgaris 12/23/2020    S/P coronary artery stent placement 03/09/2016    Sensorineural hearing loss     Seropositive rheumatoid arthritis of multiple sites 12/27/2019    Overview:  -myochrysine '93-'96 -methotrexate '96--->11/98;r/s  restarted 2/99--> 8/14 (anemia) -sulfasalazine- not effective -penicillamine 6/98-->10/98; no effect -leflunomide 11/98--> - Humira '13-->didn't take - Enbrel  "12/14-->3/15- no effect!   Last Assessment & Plan:  - \"aching all over\" because she had to be off her anti-rheumatic drugs for 2 weeks in preparation for her R knee surgery - he    Sick sinus syndrome 12/27/2019    Sjogren's disease     Spondylolisthesis of lumbar region 01/17/2018    Syncope, recurrent 02/08/2021    Urinary tract infection     UTI (urinary tract infection) 04/19/2024     Past Surgical History:   Procedure Laterality Date    A-V CARDIAC PACEMAKER INSERTION  2016    ATRIAL CARDIAC PACEMAKER INSERTION      CARDIAC CATHETERIZATION      CATARACT EXTRACTION      CERVICAL CORPECTOMY N/A 3/3/2021    Procedure: CERVICAL 6 CORPECTOMY WITH TITANIUM CAGE WITH NEURO MONITORING;  Surgeon: Bandar Shea MD;  Location:  [facility];  Service: Neurosurgery;  Laterality: N/A;    COLONOSCOPY  11/08/2011    One fold in the ascending colon which showed ulcer otherwise normal exam    COLONOSCOPY  11/12/2004    Normal exam repeat in five years    CORONARY ANGIOPLASTY WITH STENT PLACEMENT      X 2; 2013 & 2014    ENDOSCOPY  07/10/2014    Normal exam    ENDOSCOPY N/A 5/30/2024    Procedure: ESOPHAGOGASTRODUODENOSCOPY WITH ANESTHESIA;  Surgeon: Taiwo Sanchez MD;  Location: [facility] ENDOSCOPY;  Service: Gastroenterology;  Laterality: N/A;  preop; dysphagia  postop: balloon dilation  PCP Jesus Manuel jeff    FLAP LEG Right 9/14/2021    Procedure: RIGHT GLUTEAL FASCIOCUTANEOUS ADVANCEMENT FLAP AND RIGHT TENSOR FASCIAL JESSICA FLAP;  Surgeon: Amadeo Turner MD;  Location:  [facility];  Service: Plastics;  Laterality: Right;    HIP ABDUCTION TENOTOMY BILATERAL Right 1/14/2021    Procedure: RIGHT HIP GLUTEUS MEDLUS / MINIMUS REPAIR, POSSIBLE ACHILLES ALLOGRAFT;  Surgeon: Nino Carlson MD;  Location:  [facility];  Service: Orthopedics;  Laterality: Right;    INCISION AND DRAINAGE ABSCESS Right 6/4/2022    Procedure: INCISION AND DRAINAGE ABSCESS right hip;  Surgeon: Magda Salcido MD;  Location:  [facility];  Service: General;  " Laterality: Right;    INCISION AND DRAINAGE ABSCESS Right 6/10/2022    Procedure: RIGHT HIP INCISION AND DRAINAGE. MD NEEDS 3L VANC IRRIGATION, CURRETTES, DAICANS, KERLEX ROLLS;  Surgeon: Amadeo Turner MD;  Location:  [facility];  Service: Plastics;  Laterality: Right;    INCISION AND DRAINAGE HIP Right 2/9/2021    Procedure: HIP INCISION AND DRAINAGE;  Surgeon: Nino Carlson MD;  Location:  [facility];  Service: Orthopedics;  Laterality: Right;    INCISION AND DRAINAGE LEG Right 10/24/2021    Procedure: INCISION AND DRAINAGE LOWER EXTREMITY;  Surgeon: Amadeo Turner MD;  Location:  [facility];  Service: Plastics;  Laterality: Right;    INCISION AND DRAINAGE OF WOUND Right 7/8/2021    Procedure: INCISION AND DRAINAGE WOUND RIGHT HIP;  Surgeon: James Huntley MD;  Location:  [facility];  Service: Orthopedics;  Laterality: Right;    JOINT REPLACEMENT      KYPHOPLASTY WITH BIOPSY Bilateral 10/26/2021    Procedure: THOARCIC 12 KYPHOPLASTY WITH BIOPSY;  Surgeon: Bandar Shea MD;  Location:  [facility];  Service: Neurosurgery;  Laterality: Bilateral;    LEG DEBRIDEMENT Right 9/14/2021    Procedure: DEBRIDEMENT OF RIGHT HIP WOUND, RIGHT GLUTEAL FASCIOCUTANEOUS ADVANCEMENT FLAP AND RIGHT TENSOR FASCIAL JESSICA FLAP;  Surgeon: Amadeo Turner MD;  Location:  [facility];  Service: Plastics;  Laterality: Right;    LUMBAR DISCECTOMY Right 3/23/2021    Procedure: LUMBAR DISCECTOMY MICRO, Lumbar 1/2 right;  Surgeon: Bandar Shea MD;  Location:  [facility];  Service: Neurosurgery;  Laterality: Right;    LUMBAR FUSION N/A 1/19/2018    Procedure: L3-4,L4-5 DECOMPRESSION, POSTERIOR SPINAL FUSION WITH INSTRUMENTATION;  Surgeon: Fortino Oropeza MD;  Location:  [facility];  Service:     LUMBAR LAMINECTOMY WITH FUSION Left 1/17/2018    Procedure: LEFT L3-4 L4-5 LATERAL LUMBAR INTERBODY FUSION;  Surgeon: Fortino Oropeza MD;  Location:  [facility];  Service:     MASS EXCISION Right 4/23/2024    Procedure: RIGHT  BUTTOCK MASS EXCISION;  Surgeon: Moises Keyes MD;  Location:  [facility];  Service: General;  Laterality: Right;    MYRINGOTOMY W/ TUBES  09/04/2014    TUBES NO LONGER IN PLACE    OTHER SURGICAL HISTORY      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 so hardware was removed and spacers were placed    REPLACEMENT TOTAL KNEE Right     URETEROSCOPY LASER LITHOTRIPSY WITH STENT INSERTION N/A 6/21/2024    Procedure: URETEROSCOPY LASER LITHOTRIPSY WITH STENT INSERTION LEFT;  Surgeon: Ky Plascencia MD;  Location:  [facility];  Service: Urology;  Laterality: N/A;     PT Assessment (Last 12 Hours)       PT Evaluation and Treatment       Row Name 07/02/24 1045          Physical Therapy Time and Intention    Subjective Information complains of;weakness;fatigue  -     Document Type therapy note (daily note)  -     Mode of Treatment physical therapy  -     Patient Effort good  -[facility] Name 07/02/24 1045          General Information    Existing Precautions/Restrictions fall  -[facility] Name 07/02/24 1045          Pain    Pretreatment Pain Rating 5/10  -     Posttreatment Pain Rating 5/10  -     Pain Location - Side/Orientation Right  -     Pain Location lower  -     Pain Location - extremity  -     Pain Intervention(s) Repositioned  -       Row Name 07/02/24 1045          Bed Mobility    Rolling Left [place] (Bed Mobility) verbal cues;minimum assist (75% patient effort);moderate assist (50% patient effort)  -     Scooting/Bridging [place] (Bed Mobility) maximum assist (25% patient effort);verbal cues  -     Supine-Sit [place] (Bed Mobility) verbal cues;minimum assist (75% patient effort);moderate assist (50% patient effort)  -     Sit-Supine [place] (Bed Mobility) moderate assist (50% patient effort);maximum assist (25% patient effort);verbal cues  -     Assistive Device (Bed Mobility) bed rails;draw sheet  -       Row Name 07/02/24 1045          Transfers    Comment, (Transfers) stood  at BS x 5 reps  -[facility] Name 07/02/24 1045          Sit-Stand Transfer    Sit-Stand [place] (Transfers) moderate assist (50% patient effort);verbal cues  [facility]     Assistive Device (Sit-Stand Transfers) walker, front-wheeled  -       Row Name 07/02/24 1045          Stand-Sit Transfer    Stand-Sit [place] (Transfers) contact guard;minimum assist (75% patient effort);verbal cues  -[facility] Name 07/02/24 1045          Safety Issues, Functional Mobility    Impairments Affecting Function (Mobility) balance;pain;strength  -[facility] Name 07/02/24 1045          Motor Skills    Comments, Therapeutic Exercise BLE AROM 10 x 2 reps sitting EOB  -[facility] Name             Wound 04/23/24 0936 Right gluteal Incision    Wound - Properties Group Placement Date: 04/23/24  -EP Placement Time: 0936  -EP Present on Original Admission: N  -EP Side: Right  -EP Location: gluteal  -EP Primary Wound Type: Incision  -EP    Retired Wound - Properties Group Placement Date: 04/23/24  -EP Placement Time: 0936  -EP Present on Original Admission: N  -EP Side: Right  -EP Location: gluteal  -EP Primary Wound Type: Incision  -EP    Retired Wound - Properties Group Date first assessed: 04/23/24  -EP Time first assessed: 0936  -EP Present on Original Admission: N  -EP Side: Right  -EP Location: gluteal  -EP Primary Wound Type: Incision  -EP      [facility] Name 07/02/24 1045          Plan of Care Review    Plan of Care Reviewed With patient;caregiver;sibling  -     Progress no change  -     Outcome Evaluation pt trans to EOB min assist, sat EOB 20 minutes sba, BLE AROM 10 x 2 reps, sit-stand mod assist, performed 5 reps , trans back to bed mod-max, pt would benefit from cont therapy  -[facility] Name 07/02/24 1045          Positioning and Restraints    Pre-Treatment Position in bed  -     Post Treatment Position bed  -     In Bed fowlers;call light within reach;encouraged to call for assist;with family/caregiver  [facility]                User Key  (r) = Recorded By, (t) = Taken By, (c) = Cosigned By      Initials Name Provider Type    Olga Rao, PTA Physical Therapist Assistant    Sonny Thompson, RN Registered Nurse                    Physical Therapy Education       Title: PT OT SLP Therapies (Done)       Topic: Physical Therapy (Done)       Point: Mobility training (Done)       Learning Progress Summary             Patient Acceptance, E, VU by KS at 6/20/2024 1715    Acceptance, E, VU by MS at 6/18/2024 1502    Comment: role of PT in her care                         Point: Home exercise program (Done)       Learning Progress Summary             Patient Acceptance, E, VU by KS at 6/20/2024 1715    Acceptance, E,D,TB, VU,DU,NR by  at 6/15/2024 1514    Acceptance, E,TB,D, VU,DU,NR by  at 6/14/2024 1550                         Point: Body mechanics (Done)       Learning Progress Summary             Patient Acceptance, E, VU by KS at 6/20/2024 1715    Acceptance, E,D,TB, VU,DU,NR by  at 6/15/2024 1514    Acceptance, E,TB,D, VU,DU,NR by  at 6/14/2024 1550                         Point: Precautions (Done)       Learning Progress Summary             Patient Acceptance, E, VU by KS at 6/20/2024 1715    Acceptance, E,D,TB, VU,DU,NR by  at 6/15/2024 1514    Acceptance, E,TB,D, VU,DU,NR by  at 6/14/2024 1550                                         User Key       Initials Effective Dates Name Provider Type Discipline    MS 07/11/23 -  Nicole Olson, PT, DPT, NCS Physical Therapist PT    KS 02/27/23 -  Ewelina Morales, RN Registered Nurse Nurse     10/17/23 -  Stephanie Doll, RN Registered Nurse Nurse                  PT Recommendation and Plan     Plan of Care Reviewed With: patient, caregiver, sibling  Progress: no change  Outcome Evaluation: pt trans to EOB min assist, sat EOB 20 minutes sba, BLE AROM 10 x 2 reps, sit-stand mod assist, performed 5 reps , trans back to bed mod-max, pt would benefit from cont therapy    Outcome Measures       Row Name 07/02/24 1100             How much help from another person do you currently need...    Turning from your back to your side while in flat bed without using bedrails? 3  -AH      Moving from lying on back to sitting on the side of a flat bed without bedrails? 3  -AH      Moving to and from a bed to a chair (including a wheelchair)? 2  -AH      Standing up from a chair using your arms (e.g., wheelchair, bedside chair)? 2  -AH      Climbing 3-5 steps with a railing? 1  -AH      To walk in hospital room? 1  -AH      AM-PAC 6 Clicks Score (PT) 12  -      Highest Level of Mobility Goal 4 --> Transfer to chair/commode  -         Functional Assessment    Outcome Measure Options AM-PAC 6 Clicks Basic Mobility (PT)  -                User Key  (r) = Recorded By, (t) = Taken By, (c) = Cosigned By      Initials Name Provider Type     Olga Chambers PTA Physical Therapist Assistant                     Time Calculation:    PT Charges       Row Name 07/02/24 1120             Time Calculation    Start Time 1045  -      Stop Time 1115  -      Time Calculation (min) 30 min  -      PT Received On 07/02/24  -         Time Calculation- PT    Total Timed Code Minutes- PT 30 minute(s)  -AH         Timed Charges    72543 - PT Therapeutic Activity Minutes 30  -AH         Total Minutes    Timed Charges Total Minutes 30  -AH       Total Minutes 30  -AH                User Key  (r) = Recorded By, (t) = Taken By, (c) = Cosigned By      Initials Name Provider Type     Olga Chambers PTA Physical Therapist Assistant                  Therapy Charges for Today       Code Description Service Date Service Provider Modifiers Qty    [id number]  PT THERAPEUTIC ACT EA 15 MIN 7/2/2024 Olga Chambers PTA GP 2            PT G-Codes  Outcome Measure Options: AM-PAC 6 Clicks Basic Mobility (PT)  AM-PAC 6 Clicks Score (PT): 12  AM-PAC 6 Clicks Score (OT): 13    Olga Chambers PTA  7/2/2024

## 2024-07-02 NOTE — PLAN OF CARE
Goal Outcome Evaluation:  Plan of Care Reviewed With: patient, caregiver      Pt A&Ox4 waxes and wanes; VSS. Pills taken whole in applesauce. Incontinent. Pleasant and cooperative. Pt scheduled for cholecystectomy tomorrow. NPO at midnight, Lovenox held.

## 2024-07-02 NOTE — PROGRESS NOTES
[facility] Medicine Services  INPATIENT PROGRESS NOTE    Patient Name: Tawny Shin  Date of Admission: 6/12/2024  Today's Date: 07/02/24  Length of Stay: 11  Primary Care Physician: Moises Oliveira MD    Subjective   Chief Complaint: Weakness .  HPI   Patient tolerating diet.  Denies any abdomen pain.  Patient is on room air.  Blood pressure slightly high, afebrile.  Patient is oriented x 3.    Review of Systems   Constitutional:  Positive for activity change, appetite change and fatigue. Negative for chills and fever.   HENT:  Negative for hearing loss, nosebleeds, tinnitus and trouble swallowing.    Eyes:  Negative for visual disturbance.   Respiratory:  Negative for cough, chest tightness, shortness of breath and wheezing.    Cardiovascular:  Negative for chest pain, palpitations and leg swelling.   Gastrointestinal:  Negative for abdominal distention, abdominal pain, blood in stool, constipation, diarrhea, nausea and vomiting.   Endocrine: Negative for cold intolerance, heat intolerance, polydipsia, polyphagia and polyuria.   Genitourinary:  Negative for decreased urine volume, difficulty urinating, dysuria, flank pain, frequency and hematuria.   Musculoskeletal:  Positive for arthralgias, gait problem and myalgias. Negative for joint swelling.   Skin:  Negative for rash.   Allergic/Immunologic: Negative for immunocompromised state.   Neurological:  Positive for weakness. Negative for dizziness, syncope, light-headedness and headaches.   Hematological:  Negative for adenopathy. Does not bruise/bleed easily.   All pertinent negatives and positives are as above. All other systems have been reviewed and are negative unless otherwise stated.     Objective    Temp:  [97.5 °F (36.4 °C)-97.9 °F (36.6 °C)] 97.5 °F (36.4 °C)  Heart Rate:  [73-78] 76  Resp:  [16] 16  BP: (123-149)/(50-79) 149/79  Physical Exam  Vitals and nursing note reviewed.   Constitutional:        "Comments: Chronically ill.  Deconditioning.   HENT:      Head: Normocephalic.   Eyes:      Conjunctiva/sclera: Conjunctivae normal.      Pupils: Pupils are equal, round, and reactive to light.   Cardiovascular:      Rate and Rhythm: Normal rate and regular rhythm.      Heart sounds: Normal heart sounds.   Pulmonary:      Effort: Pulmonary effort is normal. No respiratory distress.      Breath sounds: Normal breath sounds.   Abdominal:      General: Bowel sounds are normal. There is no distension.      Palpations: Abdomen is soft.      Comments: Obesity.     Musculoskeletal:         General: No swelling.      Cervical back: Neck supple.   Skin:     General: Skin is warm and dry.      Capillary Refill: Capillary refill takes 2 to 3 seconds.      Findings: No rash.   Neurological:      Mental Status: She is alert and oriented to person, place, and time.      Motor: Weakness present.      Coordination: Coordination abnormal.      Gait: Gait abnormal.   Psychiatric:         Mood and Affect: Mood normal.         Behavior: Behavior normal.         Thought Content: Thought content normal.       Results Review:  I have reviewed the labs, radiology results, and diagnostic studies.    Laboratory Data:   Results from last 7 days   Lab Units 07/01/24 0424 06/30/24  0552 06/29/24  0447   WBC 10*3/mm3 5.42 4.29 4.53   HEMOGLOBIN g/dL 8.5* 8.0* 8.1*   HEMATOCRIT % 29.1* 26.9* 27.2*   PLATELETS 10*3/mm3 194 142 151        Results from last 7 days   Lab Units 07/01/24 0424 06/30/24  0552 06/29/24  0447   SODIUM mmol/L 136 139 138   POTASSIUM mmol/L 4.3 3.8 3.8   CHLORIDE mmol/L 102 105 103   CO2 mmol/L 23.0 26.0 27.0   BUN mg/dL 15 12 13   CREATININE mg/dL 0.91 0.87 1.10*   CALCIUM mg/dL 9.0 8.6 8.7   BILIRUBIN mg/dL  --   --  0.2   ALK PHOS U/L  --   --  86   ALT (SGPT) U/L  --   --  21   AST (SGOT) U/L  --   --  25   GLUCOSE mg/dL 123* 92 92       Culture Data:   No results found for: \"BLOODCX\", \"URINECX\", \"WOUNDCX\", \"MRSACX\", " "\"RESPCX\", \"STOOLCX\"    Radiology Data:   Imaging Results (Last 24 Hours)       ** No results found for the last 24 hours. **            I have reviewed the patient's current medications.     Assessment/Plan   Assessment  Active Hospital Problems    Diagnosis     **Altered mental status     Acute gout     Esophageal dysphagia     UTI (urinary tract infection)     Hydronephrosis with urinary obstruction due to ureteral calculus     Acute cholecystitis     Hypothyroidism     Stage 3b chronic kidney disease     Near functional paraplegia     Cholelithiasis     Essential hypertension     Sick sinus syndrome     Coronary artery disease involving native coronary artery of native heart without angina pectoris     Venous insufficiency     Rheumatoid arthritis involving multiple sites     Chronic heart failure with preserved ejection fraction     Class 1 obesity due to excess calories with serious comorbidity and body mass index (BMI) of 33.0 to 33.9 in adult     Functional neurological symptom disorder with weakness or paralysis     Chronic anticoagulation     Chronic embolism and thrombosis of unspecified deep veins of left lower extremity        Treatment Plan  AMS improved. KS treated.   general surgery to evaluate for cholecystectomy this week     Gout . New problem RLE pain with elevated uric acid level, continue colchicine.  Continue allopurinol.     Recurrent UTI/neurogenic bladder.  Patient finished cefepime/Flagyl antibiotics.  Continue HipRex.  Repeated UA, negative for bacteria.  Consult ID.  Urology consulted and following.   CT scan abdomen pelvic . Obstructive uropathy on the left with mild to moderate dilatation of the upper tracts of the left kidney and left ureter into the true pelvis where there is a 5 x 6 mm calculus within the left ureter approximately 4 to 5 cm above the UVJ- right ureter is decompressed and normal in appearance- No evidence of renal mass. No perinephric fluid collection present, " gallbladder is mildly distended- gallstones within the gallbladder neck,  No pericholecystic inflammatory changes or biliary dilatation, Constipation,  No obstruction or free air, Normal appendix, Calcified fibroid within the lower uterine segment, Mild constipation, No obstruction or free air, Normal appendix, Small fat-containing periumbilical hernia, Previous kyphoplasty at T12, Previous fusion at L3-L4 and L4-L5, Left basilar atelectasis..  Recommendation from urology-evaluation of neurogenic bladder at a university setting like [employer].     Obstructing renal calculi left side/moderate dilated of left ureter.  Callus 5 x 6 mm and left 4 to 5 cm above the UVJ junction.  Status post 6/21/2024 urethroscope laser lithotripsy with stent insertion left.      Gallstone, within the gallbladder neck/dysfunctional gallbladder.  Mild distended gallbladder.  Right upper quadrant tenderness.  Ultrasound of the gallbladder-Cholelithiasis with small shadowing stones and sludge within the region of the gallbladder neck- Moderate distention of the gallbladder- however no gallbladder wall thickening or pericholecystic fluid identified- No biliary dilatation.  HIDA scan-Nonvisualization of the gallbladder on images obtained up to 90 minutes- Cystic duct obstruction and acute cholecystitis cannot be excluded on the basis of this exam,  No evidence of common bile duct obstruction with emptying of activity into the C-loop of the duodenum and proximal jejunum, Gallstones and sludge were demonstrated on recent ultrasound- combined with nonvisualization of the gallbladder exclude performance of an ejection fraction..  Discussed with general surgery Dr. Jo-plan for gallbladder removal this coming Tuesday, DC Eliquis today 6/22/2024 start Lovenox today, nursing communication to stop Lovenox 24 hours before surgery on Monday.  Dr. Jo also recommended cardiac clearance for surgery.   Cholecystectomy delay due to waxing and waning  encephalopathy and concern for further cognitive decline.    General surgery consulted for possible cholecystectomy.     Reflux/esophageal dysphagia. Dysphagia recent esophageal stretching, GI evaluated and recommended to continue outpatient follow-up.  SLP evaluated and recommended to continue regular diet.  Protonix . Zofran as needed.     Altered mental status/encephalopathy, improved  Consult neurology  She does not seem to receive anything overnight that could cause worsening mental status changes.  Metronidazole has an incidence of encephalopathy but average is a 28 days and worsening patients with liver disease which makes shortness seem to have at this point.  CT scan the head-No acute intracranial abnormality, Chronic sinusitis and left mastoid effusion.  Repeat this morning.  Medications reviewed  Chest x-ray-Stable chest exam without acute process, No visualized infiltrate.   Patient is on room air.     Rheumatoid arthritis.  Rheumatoid factor quantitative normal.  Arava.  Prednisone.  Lovenox full dose.   Doppler right lower extremity and NISREEN normal.  Pain control.     Hypothyroidism.  Synthroid.     Chronic stage IIIb renal failure.  Creatinine normalized.  Hold Lasix.      Hypomagnesia. Resolved.     Hyponatremia.  Resolved.     Hypokalemia.  Resolved.  Magnesium normal.     Hypertension/sick sinus syndrome/venous insufficiency/CHF.  Hold Eliquis, therapeutic Lovenox.  Aspirin . Coreg . Lasix.  Imdur.  Cozaar . Aldactone.  Nitro as needed.     Right gluteal wound.  Consult wound care.     Rheumatoid arthritis. Arava.     Depression/anxiety.  Cymbalta.     Postmenopausal.  Estradiol cream.     Anemia.  Hemoglobin decreased.  Iron sulfate.  Folic acid.  No sign of acute bleed.     Pain . lidocaine patch.  Voltaren gel.  Ultram as needed.     Chronic DVT.  DC Eliquis due to possible surgery, on Lovenox.     Functional neurological symptom disorder with paraplegia.     Nutrition.  Cardiac diet.  Vitamin C.   Multivitamins.  Regular/house diet.  Boost supplement.     Deconditioning.  PT and OT consult.  Patient use a walker and bedbound at baseline.     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  Last urine culture shows no growth.  Blood culture-no growth for 5 days.     Dr. Shin's mom.  Patient already have home health at home.  Patient request for lift at discharge.     Medical Decision Making  Number and Complexity of problems: Recurrent UTI/altered mental status/reflux/rheumatoid arthritis/chronic 3B renal failure  Differential Diagnosis: None     Conditions and Status        Condition is unchanged.     MDM Data  External documents reviewed: Previous note .  Cardiac tracing (EKG, telemetry) interpretation: Sinus.  Radiology interpretation: X-ray/CT scan of the head  Labs reviewed: Laboratory  Any tests that were considered but not ordered: Lab in a.m.     Decision rules/scores evaluated (example [id number], [place], etc): None     Discussed with: Patient and family     Care Planning  Shared decision making: Patient and family  Code status and discussions: DNR     Disposition  Social Determinants of Health that impact treatment or disposition: From home  1 to 3 days.    Electronically signed by Saulo Ambriz MD, 07/02/24, 13:57 CDT.

## 2024-07-02 NOTE — THERAPY TREATMENT NOTE
Acute Care - Occupational Therapy Treatment Note  [facility]     Patient Name: Tawny Shin  : 1955  MRN: [id number]  Today's Date: 2024     Date of Referral to OT: 24       Admit Date: 2024       ICD-10-CM ICD-9-CM   1. Acute UTI (urinary tract infection)  N39.0 599.0   2. Altered mental status, unspecified altered mental status type  R41.82 780.97   3. Open wound of right buttock, sequela  S31.819S 906.0   4. Cyst of buttocks  L72.9 706.2   5. Dysphagia, unspecified type  R13.10 787.20   6. Impaired mobility [Z74.09]  Z74.09 799.89   7. Hydronephrosis with urinary obstruction due to ureteral calculus  N13.2 592.1     591   8. Acute cholecystitis  K81.0 575.0   9. Acute congestive heart failure, unspecified heart failure type  I50.9 428.0     Patient Active Problem List   Diagnosis    T12 compression fracture, initial encounter    Chronic embolism and thrombosis of unspecified deep veins of left lower extremity    Chronic anticoagulation    Iron deficiency anemia    Osteoporosis    E coli bacteremia    Epidural hematoma    Pleural effusion, left    Functional neurological symptom disorder with weakness or paralysis    Class 1 obesity due to excess calories with serious comorbidity and body mass index (BMI) of 33.0 to 33.9 in adult    Rheumatoid arthritis involving multiple sites    Chronic heart failure with preserved ejection fraction    Venous insufficiency    Coronary artery disease involving native coronary artery of native heart without angina pectoris    Sick sinus syndrome    Essential hypertension    Pressure injury of skin of heel    Chronic pain    Anemia of chronic disease    Cholelithiasis    Pressure injury of skin of buttock    Near functional paraplegia    Skin cancer    Squamous cell carcinoma of back    Hematoma    Right-sided chest wall pain    Hyperlipidemia LDL goal <70    Malodorous urine    Stage 3b chronic kidney disease    Cyst of buttocks    Sepsis due to  Escherichia coli without acute organ dysfunction    Generalized weakness    Paralysis    History of urinary retention    Bacteremia due to Enterobacter species    Bacteremia    Hypothyroidism    Abnormal CT of the abdomen    Presence of cardiac pacemaker    Altered mental status    UTI (urinary tract infection)    Esophageal dysphagia    Hydronephrosis with urinary obstruction due to ureteral calculus    Acute cholecystitis    Acute gout     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    Elevated cholesterol     Eustachian tube dysfunction     Heart disease     Herpes simplex     History of transfusion     Hyperlipidemia     Hypertension     Hypothyroidism 12/23/2020    Intrinsic asthma 12/23/2020    Knee dislocation     Labral tear of right hip joint     Laryngitis sicca     Laryngitis, chronic     Left carotid bruit 03/09/2016    MI (myocardial infarction)     Myalgia due to statin 06/25/2019    Open wound of right hip 09/14/2021    Osteomyelitis of right femur 07/06/2021    Otorrhea     Pacemaker 11/17/2016    Primary osteoarthritis of left knee 12/23/2020    Psoriasis vulgaris 12/23/2020    S/P coronary artery stent placement 03/09/2016    Sensorineural hearing loss     Seropositive rheumatoid arthritis of multiple sites 12/27/2019    Overview:  -myochrysine '93-'96 -methotrexate '96--->11/98;r/s  restarted 2/99--> 8/14 (anemia) -sulfasalazine- not effective -penicillamine 6/98-->10/98; no effect  "-leflunomide 11/98--> - Humira '13-->didn't take - Enbrel 12/14-->3/15- no effect!   Last Assessment & Plan:  - \"aching all over\" because she had to be off her anti-rheumatic drugs for 2 weeks in preparation for her R knee surgery - he    Sick sinus syndrome 12/27/2019    Sjogren's disease     Spondylolisthesis of lumbar region 01/17/2018    Syncope, recurrent 02/08/2021    Urinary tract infection     UTI (urinary tract infection) 04/19/2024     Past Surgical History:   Procedure Laterality Date    A-V CARDIAC PACEMAKER INSERTION  2016    ATRIAL CARDIAC PACEMAKER INSERTION      CARDIAC CATHETERIZATION      CATARACT EXTRACTION      CERVICAL CORPECTOMY N/A 3/3/2021    Procedure: CERVICAL 6 CORPECTOMY WITH TITANIUM CAGE WITH NEURO MONITORING;  Surgeon: Bandar Shea MD;  Location: [facility] OR;  Service: Neurosurgery;  Laterality: N/A;    COLONOSCOPY  11/08/2011    One fold in the ascending colon which showed ulcer otherwise normal exam    COLONOSCOPY  11/12/2004    Normal exam repeat in five years    CORONARY ANGIOPLASTY WITH STENT PLACEMENT      X 2; 2013 & 2014    ENDOSCOPY  07/10/2014    Normal exam    ENDOSCOPY N/A 5/30/2024    Procedure: ESOPHAGOGASTRODUODENOSCOPY WITH ANESTHESIA;  Surgeon: Taiwo Sanchez MD;  Location: [facility] ENDOSCOPY;  Service: Gastroenterology;  Laterality: N/A;  preop; dysphagia  postop: balloon dilation  PCP Jesus Manuel jeff    FLAP LEG Right 9/14/2021    Procedure: RIGHT GLUTEAL FASCIOCUTANEOUS ADVANCEMENT FLAP AND RIGHT TENSOR FASCIAL JESSICA FLAP;  Surgeon: Amadeo Turner MD;  Location: [facility] OR;  Service: Plastics;  Laterality: Right;    HIP ABDUCTION TENOTOMY BILATERAL Right 1/14/2021    Procedure: RIGHT HIP GLUTEUS MEDLUS / MINIMUS REPAIR, POSSIBLE ACHILLES ALLOGRAFT;  Surgeon: Nino Carlson MD;  Location: [facility] OR;  Service: Orthopedics;  Laterality: Right;    INCISION AND DRAINAGE ABSCESS Right 6/4/2022    Procedure: INCISION AND DRAINAGE ABSCESS right hip;  Surgeon: Omari" Magda BECKMAN MD;  Location:  [facility] OR;  Service: General;  Laterality: Right;    INCISION AND DRAINAGE ABSCESS Right 6/10/2022    Procedure: RIGHT HIP INCISION AND DRAINAGE. MD NEEDS 3L VANC IRRIGATION, CURRLORIN, DAICANS, KERLEX ROLLS;  Surgeon: Amadeo Turner MD;  Location:  [facility] OR;  Service: Plastics;  Laterality: Right;    INCISION AND DRAINAGE HIP Right 2/9/2021    Procedure: HIP INCISION AND DRAINAGE;  Surgeon: Nino Carlson MD;  Location:  [facility] OR;  Service: Orthopedics;  Laterality: Right;    INCISION AND DRAINAGE LEG Right 10/24/2021    Procedure: INCISION AND DRAINAGE LOWER EXTREMITY;  Surgeon: Amadeo Turner MD;  Location:  [facility] OR;  Service: Plastics;  Laterality: Right;    INCISION AND DRAINAGE OF WOUND Right 7/8/2021    Procedure: INCISION AND DRAINAGE WOUND RIGHT HIP;  Surgeon: James Huntley MD;  Location:  [facility] OR;  Service: Orthopedics;  Laterality: Right;    JOINT REPLACEMENT      KYPHOPLASTY WITH BIOPSY Bilateral 10/26/2021    Procedure: THOARCIC 12 KYPHOPLASTY WITH BIOPSY;  Surgeon: Bandar Shea MD;  Location:  [facility] OR;  Service: Neurosurgery;  Laterality: Bilateral;    LEG DEBRIDEMENT Right 9/14/2021    Procedure: DEBRIDEMENT OF RIGHT HIP WOUND, RIGHT GLUTEAL FASCIOCUTANEOUS ADVANCEMENT FLAP AND RIGHT TENSOR FASCIAL JESSICA FLAP;  Surgeon: Amadeo Turner MD;  Location:  [facility] OR;  Service: Plastics;  Laterality: Right;    LUMBAR DISCECTOMY Right 3/23/2021    Procedure: LUMBAR DISCECTOMY MICRO, Lumbar 1/2 right;  Surgeon: Bandar Shea MD;  Location:  [facility] OR;  Service: Neurosurgery;  Laterality: Right;    LUMBAR FUSION N/A 1/19/2018    Procedure: L3-4,L4-5 DECOMPRESSION, POSTERIOR SPINAL FUSION WITH INSTRUMENTATION;  Surgeon: Fortino Oropeza MD;  Location:  [facility] OR;  Service:     LUMBAR LAMINECTOMY WITH FUSION Left 1/17/2018    Procedure: LEFT L3-4 L4-5 LATERAL LUMBAR INTERBODY FUSION;  Surgeon: Fortino Oropeza MD;  Location:  [facility] OR;  Service:      MASS EXCISION Right 4/23/2024    Procedure: RIGHT BUTTOCK MASS EXCISION;  Surgeon: Moises Keyes MD;  Location:  [facility] OR;  Service: General;  Laterality: Right;    MYRINGOTOMY W/ TUBES  09/04/2014    TUBES NO LONGER IN PLACE    OTHER SURGICAL HISTORY      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 so hardware was removed and spacers were placed    REPLACEMENT TOTAL KNEE Right     URETEROSCOPY LASER LITHOTRIPSY WITH STENT INSERTION N/A 6/21/2024    Procedure: URETEROSCOPY LASER LITHOTRIPSY WITH STENT INSERTION LEFT;  Surgeon: Ky Plascencia MD;  Location:  [facility] OR;  Service: Urology;  Laterality: N/A;         OT ASSESSMENT FLOWSHEET (Last 12 Hours)       OT Evaluation and Treatment       Row Name 07/02/24 1312                   OT Time and Intention    Subjective Information no complaints  -TS        Document Type therapy note (daily note)  -TS        Mode of Treatment occupational therapy  -TS        Patient Effort good  -TS           General Information    Existing Precautions/Restrictions fall  -TS           Pain Assessment    Pretreatment Pain Rating 0/10 - no pain  -TS        Posttreatment Pain Rating 0/10 - no pain  -TS           Cognition    Orientation Status (Cognition) oriented x 4  -TS        Personal Safety Interventions fall prevention program maintained;gait belt;nonskid shoes/slippers when out of bed  -TS           Activities of Daily Living    BADL Assessment/Intervention toileting  -TS           Toileting Assessment/Training    Hermon Level (Toileting) adjust/manage clothing;perform perineal hygiene;change pad/brief;maximum assist (25% patient effort)  -TS        Position (Toileting) supine  -TS           Bed Mobility    Rolling Left Hermon (Bed Mobility) contact guard  -TS        Rolling Right Hermon (Bed Mobility) contact guard  -TS        Scooting/Bridging Hermon (Bed Mobility) maximum assist (25% patient effort);2 person assist  -TS           Motor Skills    Comments,  Therapeutic Exercise BUE 10 reps x2 sets with 3lb weight  -TS           Wound 04/23/24 0936 Right gluteal Incision    Wound - Properties Group Placement Date: 04/23/24  -EP Placement Time: 0936  -EP Present on Original Admission: N  -EP Side: Right  -EP Location: gluteal  -EP Primary Wound Type: Incision  -EP    Retired Wound - Properties Group Placement Date: 04/23/24  -EP Placement Time: 0936  -EP Present on Original Admission: N  -EP Side: Right  -EP Location: gluteal  -EP Primary Wound Type: Incision  -EP    Retired Wound - Properties Group Date first assessed: 04/23/24  -EP Time first assessed: 0936  -EP Present on Original Admission: N  -EP Side: Right  -EP Location: gluteal  -EP Primary Wound Type: Incision  -EP       Plan of Care Review    Plan of Care Reviewed With patient  -TS        Progress improving  -TS           Positioning and Restraints    Pre-Treatment Position in bed  -TS        Post Treatment Position bed  -TS        In Bed fowlers;call light within reach;encouraged to call for assist;with family/caregiver;side rails up x3  -TS                  User Key  (r) = Recorded By, (t) = Taken By, (c) = Cosigned By      Initials Name Effective Dates    TS Carolee Giordano COTA 02/03/23 -     EP Sonny Rivero RN 01/22/24 -                      Occupational Therapy Education       Title: PT OT SLP Therapies (Done)       Topic: Occupational Therapy (Done)       Point: ADL training (Done)       Description:   Instruct learner(s) on proper safety adaptation and remediation techniques during self care or transfers.   Instruct in proper use of assistive devices.                  Learning Progress Summary             Patient Acceptance, E, VU,NR by KANDY at 6/28/2024 1110    Acceptance, E, VU by KS at 6/20/2024 1715    Acceptance, E, VU by EC at 6/20/2024 1558    Acceptance, E, VU by EC at 6/17/2024 1525   Caregiver Acceptance, E, VU by EC at 6/20/2024 1558                         Point: Home exercise program  (Done)       Description:   Instruct learner(s) on appropriate technique for monitoring, assisting and/or progressing therapeutic exercises/activities.                  Learning Progress Summary             Patient Acceptance, E, VU by KS at 6/20/2024 1715    Acceptance, E, VU by EC at 6/20/2024 1558   Caregiver Acceptance, E, VU by EC at 6/20/2024 1558                         Point: Precautions (Done)       Description:   Instruct learner(s) on prescribed precautions during self-care and functional transfers.                  Learning Progress Summary             Patient Acceptance, E, VU,NR by LS at 6/28/2024 1110    Acceptance, E, VU by KS at 6/20/2024 1715    Acceptance, E, VU,NR by LS at 6/19/2024 1136    Acceptance, E, VU by EC at 6/17/2024 1525                         Point: Body mechanics (Done)       Description:   Instruct learner(s) on proper positioning and spine alignment during self-care, functional mobility activities and/or exercises.                  Learning Progress Summary             Patient Acceptance, E, VU,NR by LS at 6/28/2024 1110    Acceptance, E, VU by KS at 6/20/2024 1715    Acceptance, E, VU by EC at 6/20/2024 1558    Acceptance, E, VU,NR by LS at 6/19/2024 1136    Acceptance, E, VU by EC at 6/17/2024 1525   Caregiver Acceptance, E, VU by EC at 6/20/2024 1558                                         User Key       Initials Effective Dates Name Provider Type Discipline    KS 02/27/23 -  Ewelina Morales RN Registered Nurse Nurse     06/20/22 -  Bianca Mcgarry, OTR/L Occupational Therapist OT    EC 10/13/23 -  Ashley Martino OTR/L Occupational Therapist OT                      OT Recommendation and Plan     Plan of Care Review  Plan of Care Reviewed With: patient  Progress: improving  Outcome Evaluation: Pt alert and oriented this PM. Pt sat EOB with SBA for greater than 20 minutes and completed self feeding and grooming tasks. Pt min/mod A for sit<>supine and SBA for rolling L/R in  bed with bed rail. Pt plans to discharge home with caregivers and HH. Continue OT POC  Plan of Care Reviewed With: patient  Outcome Evaluation: Pt alert and oriented this PM. Pt sat EOB with SBA for greater than 20 minutes and completed self feeding and grooming tasks. Pt min/mod A for sit<>supine and SBA for rolling L/R in bed with bed rail. Pt plans to discharge home with caregivers and HH. Continue OT POC     Outcome Measures       Row Name 07/02/24 1500 07/02/24 1100          How much help from another person do you currently need...    Turning from your back to your side while in flat bed without using bedrails? -- 3  -AH     Moving from lying on back to sitting on the side of a flat bed without bedrails? -- 3  -AH     Moving to and from a bed to a chair (including a wheelchair)? -- 2  -AH     Standing up from a chair using your arms (e.g., wheelchair, bedside chair)? -- 2  -AH     Climbing 3-5 steps with a railing? -- 1  -AH     To walk in hospital room? -- 1  -AH     AM-PAC 6 Clicks Score (PT) -- 12  -AH     Highest Level of Mobility Goal -- 4 --> Transfer to chair/commode  -AH        How much help from another is currently needed...    Putting on and taking off regular lower body clothing? 2  -TS --     Bathing (including washing, rinsing, and drying) 2  -TS --     Toileting (which includes using toilet bed pan or urinal) 2  -TS --     Putting on and taking off regular upper body clothing 3  -TS --     Taking care of personal grooming (such as brushing teeth) 3  -TS --     Eating meals 4  -TS --     AM-PAC 6 Clicks Score (OT) 16  -TS --        Functional Assessment    Outcome Measure Options -- AM-PAC 6 Clicks Basic Mobility (PT)  -               User Key  (r) = Recorded By, (t) = Taken By, (c) = Cosigned By      Initials Name Provider Type    Olga Rao, PTA Physical Therapist Assistant    Carolee Roger COTA Occupational Therapist Assistant                    Time Calculation:    Time  Calculation- OT       Row Name 07/02/24 1554             Time Calculation- OT    OT Start Time 1312  -TS      OT Stop Time 1410  -TS      OT Time Calculation (min) 58 min  -TS      Total Timed Code Minutes- OT 58 minute(s)  -TS      OT Received On 07/02/24  -TS         Timed Charges    94934 - OT Self Care/Mgmt Minutes 58  -TS         Total Minutes    Timed Charges Total Minutes 58  -TS       Total Minutes 58  -TS                User Key  (r) = Recorded By, (t) = Taken By, (c) = Cosigned By      Initials Name Provider Type    TS Carolee Giordano COTA Occupational Therapist Assistant                  Therapy Charges for Today       Code Description Service Date Service Provider Modifiers Qty    60015643946 HC OT SELF CARE/MGMT/TRAIN EA 15 MIN 7/1/2024 Carolee Giordano COTA GO 4    57506709672 HC OT SELF CARE/MGMT/TRAIN EA 15 MIN 7/2/2024 Carolee Giordano COTA GO 4                 Carolee NIELSON. GALLITO Giordano  7/2/2024

## 2024-07-02 NOTE — PROGRESS NOTES
"INFECTIOUS DISEASES PROGRESS NOTE    Patient:  Tawny Shin  YOB: 1955  MRN: [id number]   Admit date: 6/12/2024   Admitting Physician: Saulo Ambriz MD  Primary Care Physician: Moises Oliveira MD    Chief Complaint: \" I need new legs\"      Interval History: Patient just stood with PT 5 times. Not able to walk  as still too weak.        Allergies:   Allergies   Allergen Reactions    Atorvastatin Other (See Comments)     LEG CRAMPS      Amoxicillin Rash    Escitalopram Rash    Nabumetone Rash    Niacin Er Rash    Penicillin G Rash    Penicillins Rash    Simvastatin Rash       Current Scheduled Medications:   allopurinol, 100 mg, Oral, Daily  ascorbic acid, 500 mg, Oral, Daily  aspirin, 81 mg, Oral, Daily  carvedilol, 25 mg, Oral, BID With Meals  colchicine, 0.6 mg, Oral, Daily  DULoxetine, 60 mg, Oral, Daily  enoxaparin, 1 mg/kg, Subcutaneous, Q12H  estradiol, 2 g, Vaginal, Once per day on Monday Thursday  ferrous sulfate, 325 mg, Oral, Daily With Breakfast  folic acid, 1,000 mcg, Oral, Daily  [Held by provider] furosemide, 40 mg, Oral, Daily  isosorbide mononitrate, 60 mg, Oral, Daily  leflunomide, 20 mg, Oral, Daily  levothyroxine, 75 mcg, Oral, Q AM  losartan, 50 mg, Oral, Daily  methenamine, 1 g, Oral, BID  multivitamin with minerals, 1 tablet, Oral, Daily  pantoprazole, 40 mg, Oral, Daily  predniSONE, 20 mg, Oral, Daily With Breakfast  sodium chloride, 10 mL, Intravenous, Q12H  spironolactone, 25 mg, Oral, Daily      Current PRN Medications:    acetaminophen    senna-docusate sodium **AND** polyethylene glycol **AND** bisacodyl **AND** bisacodyl    Calcium Replacement - Follow Nurse / BPA Driven Protocol    Diclofenac Sodium    guaifenesin    Lidocaine    Magnesium Low Dose Replacement - Follow Nurse / BPA Driven Protocol    [DISCONTINUED] Morphine **AND** naloxone    nitroglycerin    ondansetron    Phosphorus Replacement - Follow Nurse / BPA Driven Protocol    Potassium Replacement - " "Follow Nurse / BPA Driven Protocol    sodium chloride    sodium chloride    sodium chloride    traMADol              Objective     Vital Signs:  Temp (24hrs), Av.8 °F (36.6 °C), Min:97.5 °F (36.4 °C), Max:97.9 °F (36.6 °C)      /79 (BP Location: Right arm, Patient Position: Lying)   Pulse 76   Temp 97.5 °F (36.4 °C) (Oral)   Resp 16   Ht 167.6 cm (66\")   Wt 94.8 kg (209 lb)   LMP  (LMP Unknown)   SpO2 98%   BMI 33.73 kg/m²         Physical Exam:  General: The patient is sitting at bedside in no acute distress.  She just finished standing with physical therapy  Respiratory: Effort even and unlabored.  Lungs clear posteriorly    Results Review:    I reviewed the patient's new clinical results.    Lab Results:    CBC:   Lab Results   Lab 24  0338 24  0556 24  0447 24  0552 24  0424   WBC 4.39 4.86 4.53 4.29 5.42   HEMOGLOBIN 7.2* 8.1* 8.1* 8.0* 8.5*   HEMATOCRIT 23.8* 26.3* 27.2* 26.9* 29.1*   PLATELETS 150 160 151 142 194        AutoDiff:            Manual Diff:          Invalid input(s): \"MONABS\"          CMP:   Lab Results   Lab 24  0447 24  0552 24  0424   SODIUM 138 139 136   POTASSIUM 3.8 3.8 4.3   CHLORIDE 103 105 102   CO2 27.0 26.0 23.0   BUN 13 12 15   CREATININE 1.10* 0.87 0.91   CALCIUM 8.7 8.6 9.0   BILIRUBIN 0.2  --   --    ALK PHOS 86  --   --    ALT (SGPT) 21  --   --    AST (SGOT) 25  --   --    GLUCOSE 92 92 123*       Estimated Creatinine Clearance: 66.7 mL/min (by C-G formula based on SCr of 0.91 mg/dL).    Culture Results:    Microbiology Results (last 10 days)       ** No results found for the last 240 hours. **             C-Reactive Protein   Date Value Ref Range Status   2024 4.04 (H) 0.00 - 0.50 mg/dL Final   2022 3.34 (H) 0.00 - 0.50 mg/dL Final   07/15/2022 2.92 (H) 0.00 - 0.50 mg/dL Final     Sed Rate   Date Value Ref Range Status   2022 75 (H) 0 - 30 mm/hr Final   2022 72 (H) 0 - 30 mm/hr Final "   06/27/2022 38 (H) 0 - 30 mm/hr Final             Radiology:         Imaging Results (Last 72 Hours)       ** No results found for the last 72 hours. **                Active Hospital Problems    Diagnosis     **Altered mental status     Acute gout     Esophageal dysphagia     UTI (urinary tract infection)     Hydronephrosis with urinary obstruction due to ureteral calculus     Acute cholecystitis     Hypothyroidism     Stage 3b chronic kidney disease     Near functional paraplegia     Cholelithiasis     Essential hypertension     Sick sinus syndrome     Coronary artery disease involving native coronary artery of native heart without angina pectoris     Venous insufficiency     Rheumatoid arthritis involving multiple sites     Chronic heart failure with preserved ejection fraction     Class 1 obesity due to excess calories with serious comorbidity and body mass index (BMI) of 33.0 to 33.9 in adult     Functional neurological symptom disorder with weakness or paralysis     Chronic anticoagulation     Chronic embolism and thrombosis of unspecified deep veins of left lower extremity        IMPRESSION:  Recurrent urinary tract infections in patient admitted with 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  Patient completed a course of cefepime on June 25.     Obstructive left ureteral stone status post cystoscopy and left ureteral stent placement on June 21 Dr. Plascencia.  Indwelling catheter and stent removed 6/29  Right lower extremity edema with some pale erythema.  No DVT noted. Uric acid elevated.  Started on allopurinol per hospitalist  Cholelithiasis with nonvisualization of gallbladder on HIDA.  Patient has been seen by general surgery previously this admission.  Dr. Keyes is going to follow-up with patient.  Increasing creatinine had improved with holding Lasix and IV fluids for 1 day.  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  Continue local wound care to right gluteal wound      Maggie Bang MD  07/02/24  11:08 CDT

## 2024-07-02 NOTE — CASE MANAGEMENT/SOCIAL WORK
Continued Stay Note   [facility]     Patient Name: Tawny Shin  MRN: [id number]  Today's Date: 7/2/2024    Admit Date: 6/12/2024    Plan: [facility]   Discharge Plan       Row Name 07/02/24 9818       Plan    Plan Comments Events noted. Plan is still for dc home once pt is medically stable. Will continue to follow.                   Discharge Codes    No documentation.                 Expected Discharge Date and Time       Expected Discharge Date Expected Discharge Time    Jul 2, 2024               OSIEL Pizano

## 2024-07-02 NOTE — PLAN OF CARE
Goal Outcome Evaluation:  Plan of Care Reviewed With: patient        Progress: improving  Outcome Evaluation: Patient is AOx4, confused at time, VSS, on RA. Complaints of cough, given cough medication. No complaints of pain, no pain medication given. Swallowing pills in applesauce. Caregiver at bedside and is attentive to patient. Safety precautions maintained with call light within reach.

## 2024-07-02 NOTE — PLAN OF CARE
Goal Outcome Evaluation:  Plan of Care Reviewed With: patient, caregiver, sibling        Progress: no change  Outcome Evaluation: pt trans to EOB min assist, sat EOB 20 minutes sba, BLE AROM 10 x 2 reps, sit-stand mod assist, performed 5 reps , trans back to bed mod-max, pt would benefit from cont therapy

## 2024-07-03 ENCOUNTER — APPOINTMENT (OUTPATIENT)
Dept: GENERAL RADIOLOGY | Facility: HOSPITAL | Age: 69
DRG: 659 | End: 2024-07-03
Payer: MEDICARE

## 2024-07-03 ENCOUNTER — ANESTHESIA (OUTPATIENT)
Dept: PERIOP | Facility: HOSPITAL | Age: 69
End: 2024-07-03
Payer: MEDICARE

## 2024-07-03 ENCOUNTER — ANESTHESIA EVENT (OUTPATIENT)
Dept: PERIOP | Facility: HOSPITAL | Age: 69
End: 2024-07-03
Payer: MEDICARE

## 2024-07-03 PROCEDURE — 74300 X-RAY BILE DUCTS/PANCREAS: CPT

## 2024-07-03 PROCEDURE — 0FT44ZZ RESECTION OF GALLBLADDER, PERCUTANEOUS ENDOSCOPIC APPROACH: ICD-10-PCS | Performed by: PEDIATRICS

## 2024-07-03 PROCEDURE — 25010000002 PROPOFOL 10 MG/ML EMULSION: Performed by: NURSE ANESTHETIST, CERTIFIED REGISTERED

## 2024-07-03 PROCEDURE — 25010000002 GLYCOPYRROLATE 0.4 MG/2ML SOLUTION: Performed by: NURSE ANESTHETIST, CERTIFIED REGISTERED

## 2024-07-03 PROCEDURE — 47563 LAPARO CHOLECYSTECTOMY/GRAPH: CPT | Performed by: SURGERY

## 2024-07-03 PROCEDURE — 76000 FLUOROSCOPY <1 HR PHYS/QHP: CPT

## 2024-07-03 PROCEDURE — 25010000002 ONDANSETRON PER 1 MG: Performed by: NURSE ANESTHETIST, CERTIFIED REGISTERED

## 2024-07-03 PROCEDURE — 25810000003 LACTATED RINGERS PER 1000 ML: Performed by: ANESTHESIOLOGY

## 2024-07-03 PROCEDURE — 25010000002 ROPIVACAINE PER 1 MG: Performed by: SURGERY

## 2024-07-03 PROCEDURE — 88304 TISSUE EXAM BY PATHOLOGIST: CPT | Performed by: SURGERY

## 2024-07-03 PROCEDURE — 25810000003 SODIUM CHLORIDE PER 500 ML: Performed by: SURGERY

## 2024-07-03 PROCEDURE — C1726 CATH, BAL DIL, NON-VASCULAR: HCPCS | Performed by: SURGERY

## 2024-07-03 PROCEDURE — 25010000002 FENTANYL CITRATE (PF) 50 MCG/ML SOLUTION: Performed by: NURSE ANESTHETIST, CERTIFIED REGISTERED

## 2024-07-03 PROCEDURE — 25510000001 IOPAMIDOL 61 % SOLUTION: Performed by: SURGERY

## 2024-07-03 PROCEDURE — 99232 SBSQ HOSP IP/OBS MODERATE 35: CPT | Performed by: INTERNAL MEDICINE

## 2024-07-03 PROCEDURE — 25010000002 DROPERIDOL PER 5 MG: Performed by: ANESTHESIOLOGY

## 2024-07-03 PROCEDURE — BF131ZZ FLUOROSCOPY OF GALLBLADDER AND BILE DUCTS USING LOW OSMOLAR CONTRAST: ICD-10-PCS | Performed by: SURGERY

## 2024-07-03 PROCEDURE — 25010000002 CEFAZOLIN PER 500 MG: Performed by: NURSE ANESTHETIST, CERTIFIED REGISTERED

## 2024-07-03 PROCEDURE — 63710000001 PREDNISONE PER 1 MG: Performed by: SURGERY

## 2024-07-03 DEVICE — LIGACLIP 10-M/L, 10MM ENDOSCOPIC ROTATING MULTIPLE CLIP APPLIERS
Type: IMPLANTABLE DEVICE | Site: ABDOMEN | Status: FUNCTIONAL
Brand: LIGACLIP

## 2024-07-03 DEVICE — HEMOST ABS SURGICEL PWDR 3GM: Type: IMPLANTABLE DEVICE | Site: ABDOMEN | Status: FUNCTIONAL

## 2024-07-03 RX ORDER — PROPOFOL 10 MG/ML
VIAL (ML) INTRAVENOUS AS NEEDED
Status: DISCONTINUED | OUTPATIENT
Start: 2024-07-03 | End: 2024-07-03 | Stop reason: SURG

## 2024-07-03 RX ORDER — OXYCODONE HYDROCHLORIDE 5 MG/1
5 TABLET ORAL EVERY 4 HOURS PRN
Status: DISCONTINUED | OUTPATIENT
Start: 2024-07-03 | End: 2024-07-03

## 2024-07-03 RX ORDER — SODIUM CHLORIDE 0.9 % (FLUSH) 0.9 %
3 SYRINGE (ML) INJECTION EVERY 12 HOURS SCHEDULED
Status: DISCONTINUED | OUTPATIENT
Start: 2024-07-03 | End: 2024-07-03 | Stop reason: HOSPADM

## 2024-07-03 RX ORDER — GLYCOPYRROLATE 0.2 MG/ML
INJECTION INTRAMUSCULAR; INTRAVENOUS AS NEEDED
Status: DISCONTINUED | OUTPATIENT
Start: 2024-07-03 | End: 2024-07-03 | Stop reason: SURG

## 2024-07-03 RX ORDER — LABETALOL HYDROCHLORIDE 5 MG/ML
5 INJECTION, SOLUTION INTRAVENOUS
Status: DISCONTINUED | OUTPATIENT
Start: 2024-07-03 | End: 2024-07-03 | Stop reason: HOSPADM

## 2024-07-03 RX ORDER — DROPERIDOL 2.5 MG/ML
0.62 INJECTION, SOLUTION INTRAMUSCULAR; INTRAVENOUS ONCE AS NEEDED
Status: DISCONTINUED | OUTPATIENT
Start: 2024-07-03 | End: 2024-07-03 | Stop reason: HOSPADM

## 2024-07-03 RX ORDER — DROPERIDOL 2.5 MG/ML
0.62 INJECTION, SOLUTION INTRAMUSCULAR; INTRAVENOUS ONCE AS NEEDED
Status: COMPLETED | OUTPATIENT
Start: 2024-07-03 | End: 2024-07-03

## 2024-07-03 RX ORDER — LIDOCAINE HYDROCHLORIDE 20 MG/ML
INJECTION, SOLUTION EPIDURAL; INFILTRATION; INTRACAUDAL; PERINEURAL AS NEEDED
Status: DISCONTINUED | OUTPATIENT
Start: 2024-07-03 | End: 2024-07-03 | Stop reason: SURG

## 2024-07-03 RX ORDER — FENTANYL CITRATE 50 UG/ML
50 INJECTION, SOLUTION INTRAMUSCULAR; INTRAVENOUS
Status: DISCONTINUED | OUTPATIENT
Start: 2024-07-03 | End: 2024-07-03 | Stop reason: HOSPADM

## 2024-07-03 RX ORDER — ONDANSETRON 2 MG/ML
4 INJECTION INTRAMUSCULAR; INTRAVENOUS
Status: DISCONTINUED | OUTPATIENT
Start: 2024-07-03 | End: 2024-07-03 | Stop reason: HOSPADM

## 2024-07-03 RX ORDER — SODIUM CHLORIDE 9 MG/ML
40 INJECTION, SOLUTION INTRAVENOUS AS NEEDED
Status: DISCONTINUED | OUTPATIENT
Start: 2024-07-03 | End: 2024-07-03 | Stop reason: HOSPADM

## 2024-07-03 RX ORDER — NEOSTIGMINE METHYLSULFATE 5 MG/5 ML
SYRINGE (ML) INTRAVENOUS AS NEEDED
Status: DISCONTINUED | OUTPATIENT
Start: 2024-07-03 | End: 2024-07-03 | Stop reason: SURG

## 2024-07-03 RX ORDER — OXYCODONE HYDROCHLORIDE 5 MG/1
5 TABLET ORAL EVERY 4 HOURS PRN
Status: DISPENSED | OUTPATIENT
Start: 2024-07-03 | End: 2024-07-08

## 2024-07-03 RX ORDER — MAGNESIUM HYDROXIDE 1200 MG/15ML
LIQUID ORAL AS NEEDED
Status: DISCONTINUED | OUTPATIENT
Start: 2024-07-03 | End: 2024-07-03 | Stop reason: HOSPADM

## 2024-07-03 RX ORDER — ACETAMINOPHEN 500 MG
1000 TABLET ORAL ONCE
Status: COMPLETED | OUTPATIENT
Start: 2024-07-03 | End: 2024-07-03

## 2024-07-03 RX ORDER — NALOXONE HCL 0.4 MG/ML
0.04 VIAL (ML) INJECTION AS NEEDED
Status: DISCONTINUED | OUTPATIENT
Start: 2024-07-03 | End: 2024-07-03 | Stop reason: HOSPADM

## 2024-07-03 RX ORDER — SODIUM CHLORIDE 9 MG/ML
INJECTION, SOLUTION INTRAVENOUS AS NEEDED
Status: DISCONTINUED | OUTPATIENT
Start: 2024-07-03 | End: 2024-07-03 | Stop reason: HOSPADM

## 2024-07-03 RX ORDER — ONDANSETRON 2 MG/ML
INJECTION INTRAMUSCULAR; INTRAVENOUS AS NEEDED
Status: DISCONTINUED | OUTPATIENT
Start: 2024-07-03 | End: 2024-07-03 | Stop reason: SURG

## 2024-07-03 RX ORDER — CEFAZOLIN SODIUM 1 G/3ML
INJECTION, POWDER, FOR SOLUTION INTRAMUSCULAR; INTRAVENOUS AS NEEDED
Status: DISCONTINUED | OUTPATIENT
Start: 2024-07-03 | End: 2024-07-03 | Stop reason: SURG

## 2024-07-03 RX ORDER — SODIUM CHLORIDE, SODIUM LACTATE, POTASSIUM CHLORIDE, CALCIUM CHLORIDE 600; 310; 30; 20 MG/100ML; MG/100ML; MG/100ML; MG/100ML
100 INJECTION, SOLUTION INTRAVENOUS CONTINUOUS
Status: DISCONTINUED | OUTPATIENT
Start: 2024-07-03 | End: 2024-07-06

## 2024-07-03 RX ORDER — OXYCODONE AND ACETAMINOPHEN 10; 325 MG/1; MG/1
1 TABLET ORAL EVERY 4 HOURS PRN
Status: DISCONTINUED | OUTPATIENT
Start: 2024-07-03 | End: 2024-07-03 | Stop reason: HOSPADM

## 2024-07-03 RX ORDER — FLUMAZENIL 0.1 MG/ML
0.2 INJECTION INTRAVENOUS AS NEEDED
Status: DISCONTINUED | OUTPATIENT
Start: 2024-07-03 | End: 2024-07-03 | Stop reason: HOSPADM

## 2024-07-03 RX ORDER — SODIUM CHLORIDE 0.9 % (FLUSH) 0.9 %
3-10 SYRINGE (ML) INJECTION AS NEEDED
Status: DISCONTINUED | OUTPATIENT
Start: 2024-07-03 | End: 2024-07-03 | Stop reason: HOSPADM

## 2024-07-03 RX ORDER — ROCURONIUM BROMIDE 10 MG/ML
INJECTION, SOLUTION INTRAVENOUS AS NEEDED
Status: DISCONTINUED | OUTPATIENT
Start: 2024-07-03 | End: 2024-07-03 | Stop reason: SURG

## 2024-07-03 RX ORDER — FENTANYL CITRATE 50 UG/ML
INJECTION, SOLUTION INTRAMUSCULAR; INTRAVENOUS AS NEEDED
Status: DISCONTINUED | OUTPATIENT
Start: 2024-07-03 | End: 2024-07-03 | Stop reason: SURG

## 2024-07-03 RX ORDER — HYDROMORPHONE HYDROCHLORIDE 1 MG/ML
0.5 INJECTION, SOLUTION INTRAMUSCULAR; INTRAVENOUS; SUBCUTANEOUS
Status: DISCONTINUED | OUTPATIENT
Start: 2024-07-03 | End: 2024-07-03 | Stop reason: HOSPADM

## 2024-07-03 RX ADMIN — FOLIC ACID 1000 MCG: 1 TABLET ORAL at 14:09

## 2024-07-03 RX ADMIN — SPIRONOLACTONE 25 MG: 25 TABLET ORAL at 14:14

## 2024-07-03 RX ADMIN — Medication 1 TABLET: at 14:12

## 2024-07-03 RX ADMIN — COLCHICINE 0.6 MG: 0.6 TABLET ORAL at 14:16

## 2024-07-03 RX ADMIN — PANTOPRAZOLE SODIUM 40 MG: 40 TABLET, DELAYED RELEASE ORAL at 14:12

## 2024-07-03 RX ADMIN — LEFLUNOMIDE 20 MG: 20 TABLET ORAL at 14:15

## 2024-07-03 RX ADMIN — GLYCOPYRROLATE 0.4 MG: 0.2 INJECTION INTRAMUSCULAR; INTRAVENOUS at 10:25

## 2024-07-03 RX ADMIN — PROPOFOL 150 MG: 10 INJECTION, EMULSION INTRAVENOUS at 09:26

## 2024-07-03 RX ADMIN — OXYCODONE HYDROCHLORIDE AND ACETAMINOPHEN 500 MG: 500 TABLET ORAL at 14:08

## 2024-07-03 RX ADMIN — DROPERIDOL 0.62 MG: 2.5 INJECTION, SOLUTION INTRAMUSCULAR; INTRAVENOUS at 09:01

## 2024-07-03 RX ADMIN — Medication 10 ML: at 14:17

## 2024-07-03 RX ADMIN — ROCURONIUM BROMIDE 40 MG: 50 INJECTION INTRAVENOUS at 09:26

## 2024-07-03 RX ADMIN — FENTANYL CITRATE 50 MCG: 50 INJECTION, SOLUTION INTRAMUSCULAR; INTRAVENOUS at 10:11

## 2024-07-03 RX ADMIN — ONDANSETRON 4 MG: 2 INJECTION INTRAMUSCULAR; INTRAVENOUS at 10:25

## 2024-07-03 RX ADMIN — ASPIRIN 81 MG: 81 TABLET, COATED ORAL at 14:13

## 2024-07-03 RX ADMIN — FENTANYL CITRATE 100 MCG: 50 INJECTION, SOLUTION INTRAMUSCULAR; INTRAVENOUS at 09:26

## 2024-07-03 RX ADMIN — OXYCODONE HYDROCHLORIDE 5 MG: 5 TABLET ORAL at 13:03

## 2024-07-03 RX ADMIN — LOSARTAN POTASSIUM 50 MG: 50 TABLET, FILM COATED ORAL at 14:10

## 2024-07-03 RX ADMIN — Medication 10 ML: at 21:43

## 2024-07-03 RX ADMIN — FERROUS SULFATE TAB 325 MG (65 MG ELEMENTAL FE) 325 MG: 325 (65 FE) TAB at 14:08

## 2024-07-03 RX ADMIN — Medication 3 MG: at 10:25

## 2024-07-03 RX ADMIN — CARVEDILOL 25 MG: 25 TABLET, FILM COATED ORAL at 14:13

## 2024-07-03 RX ADMIN — OXYCODONE HYDROCHLORIDE 5 MG: 5 TABLET ORAL at 17:48

## 2024-07-03 RX ADMIN — LIDOCAINE HYDROCHLORIDE 60 MG: 20 INJECTION, SOLUTION EPIDURAL; INFILTRATION; INTRACAUDAL; PERINEURAL at 09:26

## 2024-07-03 RX ADMIN — METHENAMINE HIPPURATE 1 G: 1000 TABLET ORAL at 14:16

## 2024-07-03 RX ADMIN — ALLOPURINOL 100 MG: 100 TABLET ORAL at 14:09

## 2024-07-03 RX ADMIN — METHENAMINE HIPPURATE 1 G: 1000 TABLET ORAL at 21:43

## 2024-07-03 RX ADMIN — ISOSORBIDE MONONITRATE 60 MG: 60 TABLET, EXTENDED RELEASE ORAL at 14:14

## 2024-07-03 RX ADMIN — DULOXETINE HYDROCHLORIDE 60 MG: 30 CAPSULE, DELAYED RELEASE ORAL at 14:11

## 2024-07-03 RX ADMIN — CEFAZOLIN 2 G: 330 INJECTION, POWDER, FOR SOLUTION INTRAMUSCULAR; INTRAVENOUS at 09:51

## 2024-07-03 RX ADMIN — ACETAMINOPHEN 1000 MG: 500 TABLET, FILM COATED ORAL at 09:01

## 2024-07-03 RX ADMIN — PREDNISONE 20 MG: 20 TABLET ORAL at 14:11

## 2024-07-03 RX ADMIN — SODIUM CHLORIDE, POTASSIUM CHLORIDE, SODIUM LACTATE AND CALCIUM CHLORIDE 100 ML/HR: 600; 310; 30; 20 INJECTION, SOLUTION INTRAVENOUS at 09:00

## 2024-07-03 RX ADMIN — CARVEDILOL 25 MG: 25 TABLET, FILM COATED ORAL at 19:09

## 2024-07-03 NOTE — PLAN OF CARE
Goal Outcome Evaluation: Patient is alert and oriented. C/o today and ws medicated with PO SFP with relief. O22l NC after GB surgery today, VSS on . 4 stab wounds from surgery today. Fall risk protocol, bed alarm is set, call light is, calllight is with in reach. On dolphin bed. Precautions remain in place.

## 2024-07-03 NOTE — OP NOTE
PREOPERATIVE DIAGNOSIS: Acute on chronic calculous cholecystitis        POSTOPERATIVE DIAGNOSIS: Same         PROCEDURE PERFORMED: Laparoscopic cholecystectomy with intraoperative cholangiogram        SURGEON: Moises Keyes MD        ASSISTANT:   BLAYNE        SPECIMENS: Gallbladder for permanent        ANESTHESIA: General tracheal anesthesia with bilateral tap block        BLOOD LOSS: 10 mL    FLUIDS: 1000 mL        WOUND CLASSIFICATION: Class 3, contaminated        FINDINGS:   Inflamed and distended gallbladder.  Critical view of safety obtained with a solitary cystic duct and a solitary cystic artery and a clear space posteriorly one third of the way up the gallbladder fossa.  Nomral biliary anatomy. Unremarkable cholangiogram.  Excellent hemostasis.         INDICATIONS: 68 y.o. female with a history of chronic calculus cholecystitis with ongoing symptoms.        DESCRIPTION OF PROCEDURE:      Informed consent was obtained. The patient was taken to the operating room and placed on the operating table in the supine position. Bilateral SCDs were placed. General anesthesia was administered and the patient was intubated without difficulty. Ancef 2 gm was administered for preoperative antibiotics. The abdomen was prepped and draped in the usual fashion.  A full surgical timeout was performed verifying the correct patient, correct procedure and correct site for surgery.     Local anesthesia was infiltrated into the left upper quadrant at Calzada's point.  A small incision was made and a Veress needle was inserted with 2 satisfactory clicks.  An excellent saline drop test confirmed correct intra-abdominal placement.  Pneumoperitoneum was initiated to 15 mmHg.  Patient was positioned in reverse Trendelenburg.  Local anesthesia was injected in the inferior aspect of the umbilicus.  A small incision was made and a 5 mm Optiview trocar was inserted under direct visualization. The abdomen was examined.  Safe entry was confirmed  after a satisfactory exam showed no evidence of injury to intra-abdominal structures.The Veress needle stick site was examined and appeared to be hemostatic. A bilateral TAP block using 10 cc of 1% Ropivicaine and 100 mcg of Precedex was performed with local anesthesia injected into the usual trocar sites. Incisions were made and trocars were inserted under direct visualization.     The gallbladder was inflamed and distended with a large stone. The gallbladder was retracted cephalad and the peritoneum was incised using hook cautery.  Careful dissection was undertaken in the cystohepatic triangle. The dissection was moderately. There was normal biliary anatomy.  A critical view of safety was obtained of a solitary cystic duct and a solitary cystic artery was obtained with a clear space posteriorly least one third of the distance up the cystic plate.  A clip was placed high up on the cystic duct up to the neck of the gallbladder and a ductotomy was made using laparoscopic scissors.  The cholangiogram catheter was assembled and inserted into the cystic duct.  Saline was flushed without extravasation.  Under fluoroscopy, contrast was injected with filling of the cystic duct. There was retrograde filling of the common hepatic ducts and the left and right hepatic duct branches.  There was appropriate filling of the secondary hepatic radicles.  Contrast migrated anterograde through the common bile duct without any obvious filling defects and entered the duodenum with appropriate transit time.  The catheter was flushed with saline and removed.  The remainder of the gallbladder was dissected off of the gallbladder fossa.  Using a Endo Catch bag, the gallbladder was extracted through the epigastric port site.  The fascia of the port site was closed with a 0 Vicryl suture in a figure of eight fashion using a transfascial suture passer. There was meticulous hemostasis.  The trocars were removed under direct visualization and  pneumoperitoneum was released.  The incisions were irrigated using saline and closed with 4-0 Monocryl suture.  The incisions were dressed with Mastisol and Steri-Strips.     At the conclusion of the case all needle, sharp and sponge counts were correct times two at the completion of the procedure. The patient awoken from anesthesia, extubated in the operating room and was transported to the PACU in stable condition without any immediate complications.

## 2024-07-03 NOTE — ANESTHESIA PREPROCEDURE EVALUATION
Anesthesia Evaluation     Patient summary reviewed   history of anesthetic complications:  difficult airway  NPO Solid Status: > 8 hours  NPO Liquid Status: > 8 hours           Airway   Mallampati: II  TM distance: >3 FB  Neck ROM: full  No difficulty expected  Dental - normal exam     Pulmonary    (+) COPD, asthma,  Cardiovascular   Exercise tolerance: poor (<4 METS)    (+) pacemaker (Medtronic) pacemaker, hypertension well controlled 2 medications or greater, past MI  >12 months, CAD, cardiac stents (2 stents, most recent 6 years ago) more than 12 months ago , DVT resolved, hyperlipidemia    ROS comment: Interrogation report reviewed 4/2024  [employer]  96% sensing    Neuro/Psych  (+) headaches, syncope, numbness, dementia  (-) seizures, TIA, CVA  GI/Hepatic/Renal/Endo    (+) obesity, renal disease- CRI, thyroid problem hypothyroidism  (-) liver disease, diabetes    ROS Comment: Admitted with weakness, found to have UTI  Also with buttock nodule    Musculoskeletal     (+) gait problem  Abdominal    Substance History - negative use     OB/GYN          Other   arthritis (rheumatoid arthritis), blood dyscrasia anemia,   history of cancer      Other Comment: Sjogren's       Phys Exam Other: Previously gr IIb view with MIL 2                  Anesthesia Plan    ASA 3     general     (Magnet given proximity, interrogate in recovery. MS changes, but better today. )  intravenous induction     Anesthetic plan, risks, benefits, and alternatives have been provided, discussed and informed consent has been obtained with: patient.

## 2024-07-03 NOTE — PROGRESS NOTES
"INFECTIOUS DISEASES PROGRESS NOTE    Patient:  Tawny Shin  YOB: 1955  MRN: [id number]   Admit date: 6/12/2024   Admitting Physician: Saulo Ambriz MD  Primary Care Physician: Moises Oliveira MD    Chief Complaint: \"I am hurting\"      Interval History: Underwent laparoscopic cholecystectomy.  Patient was noted to have a distended and inflamed gallbladder with a large stone.  Intraoperative cholangiogram was negative.  Her caregiver notes that she was talking more and actually ate more today, however now complaining of some pain.      Allergies:   Allergies   Allergen Reactions    Atorvastatin Other (See Comments)     LEG CRAMPS      Amoxicillin Rash    Escitalopram Rash    Nabumetone Rash    Niacin Er Rash    Penicillin G Rash    Penicillins Rash    Simvastatin Rash       Current Scheduled Medications:   allopurinol, 100 mg, Oral, Daily  ascorbic acid, 500 mg, Oral, Daily  aspirin, 81 mg, Oral, Daily  carvedilol, 25 mg, Oral, BID With Meals  colchicine, 0.6 mg, Oral, Daily  DULoxetine, 60 mg, Oral, Daily  [Held by provider] enoxaparin, 1 mg/kg, Subcutaneous, Q12H  estradiol, 2 g, Vaginal, Once per day on Monday Thursday  ferrous sulfate, 325 mg, Oral, Daily With Breakfast  folic acid, 1,000 mcg, Oral, Daily  [Held by provider] furosemide, 40 mg, Oral, Daily  indocyanine green, 1.25 mg, Intravenous, Once  isosorbide mononitrate, 60 mg, Oral, Daily  leflunomide, 20 mg, Oral, Daily  levothyroxine, 75 mcg, Oral, Q AM  losartan, 50 mg, Oral, Daily  methenamine, 1 g, Oral, BID  multivitamin with minerals, 1 tablet, Oral, Daily  pantoprazole, 40 mg, Oral, Daily  predniSONE, 20 mg, Oral, Daily With Breakfast  sodium chloride, 10 mL, Intravenous, Q12H  spironolactone, 25 mg, Oral, Daily      Current PRN Medications:    acetaminophen    senna-docusate sodium **AND** polyethylene glycol **AND** bisacodyl **AND** bisacodyl    Calcium Replacement - Follow Nurse / BPA Driven Protocol    Diclofenac " "Sodium    guaifenesin    Lidocaine    Magnesium Low Dose Replacement - Follow Nurse / BPA Driven Protocol    [DISCONTINUED] Morphine **AND** naloxone    nitroglycerin    ondansetron    oxyCODONE    Phosphorus Replacement - Follow Nurse / BPA Driven Protocol    Potassium Replacement - Follow Nurse / BPA Driven Protocol    sodium chloride    sodium chloride    sodium chloride    traMADol    lactated ringers, 100 mL/hr, Last Rate: 100 mL/hr (24 0923)             Objective     Vital Signs:  Temp (24hrs), Av °F (36.7 °C), Min:97.6 °F (36.4 °C), Max:98.6 °F (37 °C)      /56 (BP Location: Right arm, Patient Position: Lying)   Pulse 60   Temp 97.6 °F (36.4 °C) (Oral)   Resp 14   Ht 167.6 cm (66\")   Wt 94.8 kg (209 lb)   LMP  (LMP Unknown)   SpO2 98%   BMI 33.73 kg/m²         Physical Exam:    General: The patient is fatigued appearing lying in bed in no acute distress.  Caregiver at bedside.  Respiratory: Effort even and unlabored  Patient had a pillow over her abdomen.  This was removed.  Surgical dressings are intact.  Neuro: Sleepy but arousable and answers questions appropriately    Results Review:    I reviewed the patient's new clinical results.    Lab Results:    CBC:   Lab Results   Lab 24  0556 2452 24   WBC 4.86 4.53 4.29 5.42   HEMOGLOBIN 8.1* 8.1* 8.0* 8.5*   HEMATOCRIT 26.3* 27.2* 26.9* 29.1*   PLATELETS 160 151 142 194        AutoDiff:            Manual Diff:          Invalid input(s): \"MONABS\"          CMP:   Lab Results   Lab 24  0447 24  0552 24  0424   SODIUM 138 139 136   POTASSIUM 3.8 3.8 4.3   CHLORIDE 103 105 102   CO2 27.0 26.0 23.0   BUN 13 12 15   CREATININE 1.10* 0.87 0.91   CALCIUM 8.7 8.6 9.0   BILIRUBIN 0.2  --   --    ALK PHOS 86  --   --    ALT (SGPT) 21  --   --    AST (SGOT) 25  --   --    GLUCOSE 92 92 123*       Estimated Creatinine Clearance: 66.7 mL/min (by C-G formula based on SCr of 0.91 " mg/dL).    Culture Results:    Microbiology Results (last 10 days)       ** No results found for the last 240 hours. **             C-Reactive Protein   Date Value Ref Range Status   06/29/2024 4.04 (H) 0.00 - 0.50 mg/dL Final   07/18/2022 3.34 (H) 0.00 - 0.50 mg/dL Final   07/15/2022 2.92 (H) 0.00 - 0.50 mg/dL Final     Sed Rate   Date Value Ref Range Status   07/05/2022 75 (H) 0 - 30 mm/hr Final   06/30/2022 72 (H) 0 - 30 mm/hr Final   06/27/2022 38 (H) 0 - 30 mm/hr Final             Radiology:         Imaging Results (Last 72 Hours)       Procedure Component Value Units Date/Time    FL Cholangiogram Operative [[id number]] Collected: 07/03/24 1038     Updated: 07/03/24 1043    Narrative:      EXAMINATION: FL CHOLANGIOGRAM OPERATIVE-     7/3/2024 9:00 AM     HISTORY: stones; N39.0-Urinary tract infection, site not specified;  R41.82-Altered mental status, unspecified; S31.819S-Unspecified open  wound of right buttock, sequela; L72.9-Follicular cyst of the skin and  subcutaneous tissue, unspecified; R13.10-Dysphagia, unspecified;  Z74.09-Other reduced mobility; N13.2-Hydronephrosis with renal and  ureteral calculous obstruction; K81.0-Acute cholecystitis; I50.9-Hea     Number of fluoroscopic spot images obtained = 1.     Fluoroscopy dose in Air Kerma mGy = 4.6865.     Fluoroscopy total dose area product (Gycm2) = 2.0600.     Fluoroscopy total exposure time = 19.0 seconds     Spot images obtained at the time of laparoscopic cholecystectomy show  cannulation of the cystic duct with contrast injection for opacification  of the biliary tree.     No filling defect is seen.     The intrahepatic bile ducts have a normal appearance.     There is no bile duct obstruction as contrast passes into the duodenum.     Summary :  1. No abnormality is seen.                    This report was signed and finalized on 7/3/2024 10:40 AM by Dr. Jesus Manuel Santos MD.       FL C Arm During Surgery [[id number]] Resulted: 07/03/24 1038      Updated: 07/03/24 1038    Narrative:      This procedure was auto-finalized with no dictation required.                Active Hospital Problems    Diagnosis     **Altered mental status     Acute gout     Esophageal dysphagia     UTI (urinary tract infection)     Hydronephrosis with urinary obstruction due to ureteral calculus     Acute cholecystitis     Hypothyroidism     Stage 3b chronic kidney disease     Near functional paraplegia     Cholelithiasis     Essential hypertension     Sick sinus syndrome     Coronary artery disease involving native coronary artery of native heart without angina pectoris     Venous insufficiency     Rheumatoid arthritis involving multiple sites     Chronic heart failure with preserved ejection fraction     Class 1 obesity due to excess calories with serious comorbidity and body mass index (BMI) of 33.0 to 33.9 in adult     Functional neurological symptom disorder with weakness or paralysis     Chronic anticoagulation     Chronic embolism and thrombosis of unspecified deep veins of left lower extremity        IMPRESSION:  Recurrent urinary tract infections in patient admitted with Carbapenem resistant Enterobacter cloacae isolated from urine status posttreatment with cefepime completed on June 25.  Cholelithiasis.  Patient is status post cholecystectomy and intraoperative cholangiogram today.  Findings of inflamed and distended gallbladder noted.     Obstructive left ureteral stone status post cystoscopy and left ureteral stent placement on June 21 Dr. Plascencia.  Indwelling catheter and stent removed 6/29  Increasing creatinine had improved with holding Lasix and IV fluids for 1 day.  Lasix remains on hold  Right gluteal wound  Overall deconditioning-PT OT following        RECOMMENDATION:     Gout treatment per hospitalist-on allopurinol.  Wean steroid?  Patient on 5 mg baseline for rheumatoid arthritis  Continue contact isolation for CRE this admission-history of MRSA and MDR  Pseudomonas.  Continue local wound care to right gluteal wound  Follow-up CBC CMP in a.m.  Continue to encourage working with PT/OT    Reviewed Dr. Keyes operative note  Reviewed Dr. Ambriz's note    Maggie Bang MD  07/03/24  13:59 CDT

## 2024-07-03 NOTE — ANESTHESIA POSTPROCEDURE EVALUATION
"Patient: Tawny Shin    Procedure Summary       Date: 07/03/24 Room / Location:  [facility] OR 04 /  [facility]    Anesthesia Start: 0923 Anesthesia Stop: 1035    Procedure: CHOLECYSTECTOMY LAPAROSCOPIC WITH INTRAOPERATIVE CHOLANGIOGRAM (Abdomen) Diagnosis:       Acute cholecystitis      (Acute cholecystitis [K81.0])    Surgeons: Moises Keyes MD Provider: Moises Chavarria CRNA    Anesthesia Type: general ASA Status: 3            Anesthesia Type: general    Vitals  Vitals Value Taken Time   /56 07/03/24 1125   Temp 98.6 °F (37 °C) 07/03/24 1042   Pulse 62 07/03/24 1133   Resp 16 07/03/24 1125   SpO2 97 % 07/03/24 1133   Vitals shown include unfiled device data.        Post Anesthesia Care and Evaluation    Patient location during evaluation: PACU  Patient participation: complete - patient participated  Level of consciousness: awake and awake and alert  Pain score: 0  Pain management: adequate    Airway patency: patent  Anesthetic complications: No anesthetic complications  PONV Status: none  Cardiovascular status: acceptable  Respiratory status: acceptable  Hydration status: acceptable    Comments: Patient discharged according to acceptable Abdifatah score per RN assessment. See nursing records for further information.     Blood pressure 144/56, pulse 60, temperature 97.6 °F (36.4 °C), temperature source Oral, resp. rate 14, height 167.6 cm (66\"), weight 94.8 kg (209 lb), SpO2 98%, not currently breastfeeding.      "

## 2024-07-03 NOTE — ANESTHESIA PROCEDURE NOTES
Airway  Urgency: elective    Date/Time: 7/3/2024 9:27 AM  Airway not difficult    General Information and Staff    Patient location during procedure: OR  CRNA/CAA: Moises Chavarria CRNA    Indications and Patient Condition  Indications for airway management: airway protection    Preoxygenated: yes  Mask difficulty assessment: 1 - vent by mask    Final Airway Details  Final airway type: endotracheal airway      Successful airway: ETT     Successful intubation technique: direct laryngoscopy  Facilitating devices/methods: Bougie  Endotracheal tube insertion site: oral  Blade: Correia  Blade size: 4 (3.5)  ETT size (mm): 7.5  Cormack-Lehane Classification: grade IIa - partial view of glottis  Placement verified by: chest auscultation and capnometry   Measured from: gums  Number of attempts at approach: 2  Assessment: lips, teeth, and gum same as pre-op and atraumatic intubation    Additional Comments  Aborted immediately direct visualization.trach seen immediately with Correia 4 intubated over bougie stylet.

## 2024-07-03 NOTE — PLAN OF CARE
Goal Outcome Evaluation:  Plan of Care Reviewed With: patient, caregiver        Progress: no change  Outcome Evaluation: Pt A&OX4 W/ episodes of confusion. VSS on RA. RAMIREZ. C/O pain managed W/ PRN meds. NPO @ 0000. IV to L arm IID. Caregiver @ bedside. Call light withing reach safety maintained.

## 2024-07-03 NOTE — PROGRESS NOTES
[facility] Medicine Services  INPATIENT PROGRESS NOTE    Patient Name: Tawny Shin  Date of Admission: 6/12/2024  Today's Date: 07/03/24  Length of Stay: 12  Primary Care Physician: Moises Oliveira MD    Subjective   Chief Complaint: Weakness .  HPI   Blood pressure stable, afebrile.  Patient is on 2 L oxygen.  Patient wants something for pain, discussed with nursing.  Will get some lab tomorrow.  Patient is awake and eating.  Status post cholecystectomy.    Review of Systems   Constitutional:  Positive for activity change, appetite change and fatigue. Negative for chills and fever.   HENT:  Negative for hearing loss, nosebleeds, tinnitus and trouble swallowing.    Eyes:  Negative for visual disturbance.   Respiratory:  Negative for cough, chest tightness, shortness of breath and wheezing.    Cardiovascular:  Negative for chest pain, palpitations and leg swelling.   Gastrointestinal: Right upper abdomen pain from surgery.  Endocrine: Negative for cold intolerance, heat intolerance, polydipsia, polyphagia and polyuria.   Genitourinary:  Negative for decreased urine volume, difficulty urinating, dysuria, flank pain, frequency and hematuria.   Musculoskeletal:  Positive for arthralgias, gait problem and myalgias. Negative for joint swelling.   Skin:  Negative for rash.   Allergic/Immunologic: Negative for immunocompromised state.   Neurological:  Positive for weakness. Negative for dizziness, syncope, light-headedness and headaches.   Hematological:  Negative for adenopathy. Does not bruise/bleed easily.  All pertinent negatives and positives are as above. All other systems have been reviewed and are negative unless otherwise stated.     Objective    Temp:  [97.6 °F (36.4 °C)-98.6 °F (37 °C)] 97.6 °F (36.4 °C)  Heart Rate:  [60-75] 60  Resp:  [14-18] 14  BP: (134-163)/(55-81) 144/56  Physical Exam  Vitals and nursing note reviewed.   Constitutional:       Comments:  "Chronically ill.  Deconditioning.   HENT:      Head: Normocephalic.   Eyes:      Conjunctiva/sclera: Conjunctivae normal.      Pupils: Pupils are equal, round, and reactive to light.   Cardiovascular:      Rate and Rhythm: Normal rate and regular rhythm.      Heart sounds: Normal heart sounds.   Pulmonary:      Effort: Pulmonary effort is normal. No respiratory distress.      Breath sounds: Normal breath sounds.   Abdominal:      General: Bowel sounds are normal. There is no distension.      Palpations: Abdomen is soft.  Right upper abdomen pain, from surgery.     Comments: Obesity.     Musculoskeletal:         General: No swelling.      Cervical back: Neck supple.   Skin:     General: Skin is warm and dry.      Capillary Refill: Capillary refill takes 2 to 3 seconds.      Findings: No rash.   Neurological:      Mental Status: She is alert and oriented to person, place, and time.      Motor: Weakness present.      Coordination: Coordination abnormal.      Gait: Gait abnormal.   Psychiatric:         Mood and Affect: Mood normal.         Behavior: Behavior normal.         Thought Content: Thought content normal.          Results Review:  I have reviewed the labs, radiology results, and diagnostic studies.    Laboratory Data:   Results from last 7 days   Lab Units 07/01/24 0424 06/30/24  0552 06/29/24  0447   WBC 10*3/mm3 5.42 4.29 4.53   HEMOGLOBIN g/dL 8.5* 8.0* 8.1*   HEMATOCRIT % 29.1* 26.9* 27.2*   PLATELETS 10*3/mm3 194 142 151        Results from last 7 days   Lab Units 07/01/24 0424 06/30/24  0552 06/29/24  0447   SODIUM mmol/L 136 139 138   POTASSIUM mmol/L 4.3 3.8 3.8   CHLORIDE mmol/L 102 105 103   CO2 mmol/L 23.0 26.0 27.0   BUN mg/dL 15 12 13   CREATININE mg/dL 0.91 0.87 1.10*   CALCIUM mg/dL 9.0 8.6 8.7   BILIRUBIN mg/dL  --   --  0.2   ALK PHOS U/L  --   --  86   ALT (SGPT) U/L  --   --  21   AST (SGOT) U/L  --   --  25   GLUCOSE mg/dL 123* 92 92       Culture Data:   No results found for: \"BLOODCX\", " "\"URINECX\", \"WOUNDCX\", \"MRSACX\", \"RESPCX\", \"STOOLCX\"    Radiology Data:   Imaging Results (Last 24 Hours)       Procedure Component Value Units Date/Time    FL Cholangiogram Operative [[id number]] Collected: 07/03/24 1038     Updated: 07/03/24 1043    Narrative:      EXAMINATION: FL CHOLANGIOGRAM OPERATIVE-     7/3/2024 9:00 AM     HISTORY: stones; N39.0-Urinary tract infection, site not specified;  R41.82-Altered mental status, unspecified; S31.819S-Unspecified open  wound of right buttock, sequela; L72.9-Follicular cyst of the skin and  subcutaneous tissue, unspecified; R13.10-Dysphagia, unspecified;  Z74.09-Other reduced mobility; N13.2-Hydronephrosis with renal and  ureteral calculous obstruction; K81.0-Acute cholecystitis; I50.9-Hea     Number of fluoroscopic spot images obtained = 1.     Fluoroscopy dose in Air Kerma mGy = 4.6865.     Fluoroscopy total dose area product (Gycm2) = 2.0600.     Fluoroscopy total exposure time = 19.0 seconds     Spot images obtained at the time of laparoscopic cholecystectomy show  cannulation of the cystic duct with contrast injection for opacification  of the biliary tree.     No filling defect is seen.     The intrahepatic bile ducts have a normal appearance.     There is no bile duct obstruction as contrast passes into the duodenum.     Summary :  1. No abnormality is seen.                    This report was signed and finalized on 7/3/2024 10:40 AM by Dr. Jesus Manuel Santos MD.       FL C Arm During Surgery [[id number]] Resulted: 07/03/24 1038     Updated: 07/03/24 1038    Narrative:      This procedure was auto-finalized with no dictation required.            I have reviewed the patient's current medications.     Assessment/Plan   Assessment  Active Hospital Problems    Diagnosis     **Altered mental status     Acute gout     Esophageal dysphagia     UTI (urinary tract infection)     Hydronephrosis with urinary obstruction due to ureteral calculus     Acute cholecystitis     " Hypothyroidism     Stage 3b chronic kidney disease     Near functional paraplegia     Cholelithiasis     Essential hypertension     Sick sinus syndrome     Coronary artery disease involving native coronary artery of native heart without angina pectoris     Venous insufficiency     Rheumatoid arthritis involving multiple sites     Chronic heart failure with preserved ejection fraction     Class 1 obesity due to excess calories with serious comorbidity and body mass index (BMI) of 33.0 to 33.9 in adult     Functional neurological symptom disorder with weakness or paralysis     Chronic anticoagulation     Chronic embolism and thrombosis of unspecified deep veins of left lower extremity        Treatment Plan  Gallstone, within the gallbladder neck/dysfunctional gallbladder.  Mild distended gallbladder.  Right upper quadrant tenderness.  Ultrasound of the gallbladder-Cholelithiasis with small shadowing stones and sludge within the region of the gallbladder neck- Moderate distention of the gallbladder- however no gallbladder wall thickening or pericholecystic fluid identified- No biliary dilatation.  HIDA scan-Nonvisualization of the gallbladder on images obtained up to 90 minutes- Cystic duct obstruction and acute cholecystitis cannot be excluded on the basis of this exam,  No evidence of common bile duct obstruction with emptying of activity into the C-loop of the duodenum and proximal jejunum, Gallstones and sludge were demonstrated on recent ultrasound- combined with nonvisualization of the gallbladder exclude performance of an ejection fraction..  Discussed with general surgery Dr. Jo-plan for gallbladder removal this coming Tuesday, DC Eliquis today 6/22/2024 start Lovenox today, nursing communication to stop Lovenox 24 hours before surgery on Monday.  Dr. Jo also recommended cardiac clearance for surgery.   Cholecystectomy delay due to waxing and waning encephalopathy and concern for further cognitive  decline.    Plan for cholecystectomy today.     Recurrent UTI/neurogenic bladder.  Patient finished cefepime/Flagyl antibiotics.  Continue HipRex.  Repeated UA, negative for bacteria.  Consult ID.  Urology consulted and following.   CT scan abdomen pelvic . Obstructive uropathy on the left with mild to moderate dilatation of the upper tracts of the left kidney and left ureter into the true pelvis where there is a 5 x 6 mm calculus within the left ureter approximately 4 to 5 cm above the UVJ- right ureter is decompressed and normal in appearance- No evidence of renal mass. No perinephric fluid collection present, gallbladder is mildly distended- gallstones within the gallbladder neck,  No pericholecystic inflammatory changes or biliary dilatation, Constipation,  No obstruction or free air, Normal appendix, Calcified fibroid within the lower uterine segment, Mild constipation, No obstruction or free air, Normal appendix, Small fat-containing periumbilical hernia, Previous kyphoplasty at T12, Previous fusion at L3-L4 and L4-L5, Left basilar atelectasis..  Recommendation from urology-evaluation of neurogenic bladder at a university setting like [place].     Obstructing renal calculi left side/moderate dilated of left ureter.  Callus 5 x 6 mm and left 4 to 5 cm above the UVJ junction.  Status post 6/21/2024 urethroscope laser lithotripsy with stent insertion left, remove stent and Horan cath 6/29/2024.     AMS improved. KS treated.  Patient is back to baseline.     Gout . New problem RLE pain with elevated uric acid level, continue colchicine.  Continue allopurinol.     Reflux/esophageal dysphagia. Dysphagia recent esophageal stretching, GI evaluated and recommended to continue outpatient follow-up.  SLP evaluated and recommended to continue regular diet.  Protonix . Zofran as needed.     Altered mental status/encephalopathy, improved  Consult neurology  She does not seem to receive anything overnight that could cause  worsening mental status changes.  Metronidazole has an incidence of encephalopathy but average is a 28 days and worsening patients with liver disease which makes shortness seem to have at this point.  CT scan the head-No acute intracranial abnormality, Chronic sinusitis and left mastoid effusion.  Repeat this morning.  Medications reviewed  Chest x-ray-Stable chest exam without acute process, No visualized infiltrate.   Is current on 2 L oxygen.     Rheumatoid arthritis.  Rheumatoid factor quantitative normal.  Arava.  Prednisone.  Lovenox full dose.   Doppler right lower extremity and NISREEN normal.  Pain control.     Hypothyroidism.  Synthroid.     Chronic stage IIIb renal failure.  Creatinine normalized.  Hold Lasix.      Hypomagnesia. Resolved.     Hyponatremia.  Resolved.     Hypokalemia.  Resolved.  Magnesium normal.     Hypertension/sick sinus syndrome/venous insufficiency/CHF.  Hold Eliquis, therapeutic Lovenox.  Aspirin . Coreg . Lasix.  Imdur.  Cozaar . Aldactone.  Nitro as needed.     Right gluteal wound.  Consult wound care.     Rheumatoid arthritis. Arava.     Depression/anxiety.  Cymbalta.     Postmenopausal.  Estradiol cream.     Anemia.  Hemoglobin decreased.  Iron sulfate.  Folic acid.  No sign of acute bleed.     Pain . lidocaine patch.  Voltaren gel.  Ultram as needed.     Chronic DVT.  DC Eliquis due to possible surgery, on Lovenox.     Functional neurological symptom disorder with paraplegia.     Nutrition.  Cardiac diet.  Vitamin C.  Multivitamins.  Regular/house diet.  Boost supplement.     Deconditioning.  PT and OT consult.  Patient use a walker and bedbound at baseline.     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  Last urine culture shows no growth.  Blood culture-no growth for 5 days.     Dr. Shin's mom.  Patient already have home health at home.  Patient request for lift at discharge.     Medical Decision Making  Number  and Complexity of problems: Recurrent UTI/altered mental status/reflux/rheumatoid arthritis/chronic 3B renal failure  Differential Diagnosis: None     Conditions and Status        Condition is unchanged.     MDM Data  External documents reviewed: Previous note .  Cardiac tracing (EKG, telemetry) interpretation: Sinus.  Radiology interpretation: X-ray/CT scan of the head  Labs reviewed: Laboratory  Any tests that were considered but not ordered: Lab in a.m.     Decision rules/scores evaluated (example [id number], Wells, etc): None     Discussed with: Patient and family     Care Planning  Shared decision making: Patient and family  Code status and discussions: DNR     Disposition  Social Determinants of Health that impact treatment or disposition: From home  1 to 3 days.       Electronically signed by Saulo Ambriz MD, 07/03/24, 12:48 CDT.

## 2024-07-04 LAB
ALBUMIN SERPL-MCNC: 2.8 G/DL (ref 3.5–5.2)
ALBUMIN/GLOB SERPL: 0.9 G/DL
ALP SERPL-CCNC: 82 U/L (ref 39–117)
ALT SERPL W P-5'-P-CCNC: 26 U/L (ref 1–33)
ANION GAP SERPL CALCULATED.3IONS-SCNC: 9 MMOL/L (ref 5–15)
AST SERPL-CCNC: 43 U/L (ref 1–32)
BILIRUB SERPL-MCNC: 0.2 MG/DL (ref 0–1.2)
BUN SERPL-MCNC: 19 MG/DL (ref 8–23)
BUN/CREAT SERPL: 21.3 (ref 7–25)
CALCIUM SPEC-SCNC: 8.9 MG/DL (ref 8.6–10.5)
CHLORIDE SERPL-SCNC: 103 MMOL/L (ref 98–107)
CO2 SERPL-SCNC: 24 MMOL/L (ref 22–29)
CREAT SERPL-MCNC: 0.89 MG/DL (ref 0.57–1)
DEPRECATED RDW RBC AUTO: 65.6 FL (ref 37–54)
EGFRCR SERPLBLD CKD-EPI 2021: 69.8 ML/MIN/1.73
ERYTHROCYTE [DISTWIDTH] IN BLOOD BY AUTOMATED COUNT: 20.2 % (ref 12.3–15.4)
GLOBULIN UR ELPH-MCNC: 3 GM/DL
GLUCOSE SERPL-MCNC: 122 MG/DL (ref 65–99)
HCT VFR BLD AUTO: 25.3 % (ref 34–46.6)
HGB BLD-MCNC: 7.5 G/DL (ref 12–15.9)
MCH RBC QN AUTO: 27.9 PG (ref 26.6–33)
MCHC RBC AUTO-ENTMCNC: 29.6 G/DL (ref 31.5–35.7)
MCV RBC AUTO: 94.1 FL (ref 79–97)
PLATELET # BLD AUTO: 206 10*3/MM3 (ref 140–450)
PMV BLD AUTO: 10.4 FL (ref 6–12)
POTASSIUM SERPL-SCNC: 5 MMOL/L (ref 3.5–5.2)
PROT SERPL-MCNC: 5.8 G/DL (ref 6–8.5)
RBC # BLD AUTO: 2.69 10*6/MM3 (ref 3.77–5.28)
SODIUM SERPL-SCNC: 136 MMOL/L (ref 136–145)
WBC NRBC COR # BLD AUTO: 4.81 10*3/MM3 (ref 3.4–10.8)

## 2024-07-04 PROCEDURE — 97164 PT RE-EVAL EST PLAN CARE: CPT | Performed by: PHYSICAL THERAPIST

## 2024-07-04 PROCEDURE — 97168 OT RE-EVAL EST PLAN CARE: CPT

## 2024-07-04 PROCEDURE — 80053 COMPREHEN METABOLIC PANEL: CPT | Performed by: FAMILY MEDICINE

## 2024-07-04 PROCEDURE — 99024 POSTOP FOLLOW-UP VISIT: CPT | Performed by: SURGERY

## 2024-07-04 PROCEDURE — 85027 COMPLETE CBC AUTOMATED: CPT | Performed by: FAMILY MEDICINE

## 2024-07-04 PROCEDURE — 99231 SBSQ HOSP IP/OBS SF/LOW 25: CPT | Performed by: INTERNAL MEDICINE

## 2024-07-04 PROCEDURE — 63710000001 PREDNISONE PER 1 MG: Performed by: SURGERY

## 2024-07-04 RX ADMIN — TRAMADOL HYDROCHLORIDE 50 MG: 50 TABLET ORAL at 12:45

## 2024-07-04 RX ADMIN — LEVOTHYROXINE SODIUM 75 MCG: 0.07 TABLET ORAL at 05:34

## 2024-07-04 RX ADMIN — CARVEDILOL 25 MG: 25 TABLET, FILM COATED ORAL at 08:49

## 2024-07-04 RX ADMIN — FERROUS SULFATE TAB 325 MG (65 MG ELEMENTAL FE) 325 MG: 325 (65 FE) TAB at 08:49

## 2024-07-04 RX ADMIN — GUAIFENESIN 200 MG: 200 SOLUTION ORAL at 21:38

## 2024-07-04 RX ADMIN — ALLOPURINOL 100 MG: 100 TABLET ORAL at 08:49

## 2024-07-04 RX ADMIN — METHENAMINE HIPPURATE 1 G: 1000 TABLET ORAL at 08:50

## 2024-07-04 RX ADMIN — ASPIRIN 81 MG: 81 TABLET, COATED ORAL at 08:49

## 2024-07-04 RX ADMIN — METHENAMINE HIPPURATE 1 G: 1000 TABLET ORAL at 21:43

## 2024-07-04 RX ADMIN — DULOXETINE HYDROCHLORIDE 60 MG: 30 CAPSULE, DELAYED RELEASE ORAL at 08:49

## 2024-07-04 RX ADMIN — ESTRADIOL 2 APPLICATOR: 0.1 CREAM VAGINAL at 08:50

## 2024-07-04 RX ADMIN — COLCHICINE 0.6 MG: 0.6 TABLET ORAL at 08:49

## 2024-07-04 RX ADMIN — OXYCODONE HYDROCHLORIDE AND ACETAMINOPHEN 500 MG: 500 TABLET ORAL at 08:49

## 2024-07-04 RX ADMIN — LEFLUNOMIDE 20 MG: 20 TABLET ORAL at 08:49

## 2024-07-04 RX ADMIN — Medication 10 ML: at 08:50

## 2024-07-04 RX ADMIN — TRAMADOL HYDROCHLORIDE 50 MG: 50 TABLET ORAL at 21:40

## 2024-07-04 RX ADMIN — Medication 10 ML: at 21:40

## 2024-07-04 RX ADMIN — Medication 1 TABLET: at 08:49

## 2024-07-04 RX ADMIN — LOSARTAN POTASSIUM 50 MG: 50 TABLET, FILM COATED ORAL at 08:49

## 2024-07-04 RX ADMIN — ISOSORBIDE MONONITRATE 60 MG: 60 TABLET, EXTENDED RELEASE ORAL at 08:49

## 2024-07-04 RX ADMIN — SPIRONOLACTONE 25 MG: 25 TABLET ORAL at 08:49

## 2024-07-04 RX ADMIN — PANTOPRAZOLE SODIUM 40 MG: 40 TABLET, DELAYED RELEASE ORAL at 08:49

## 2024-07-04 RX ADMIN — FOLIC ACID 1000 MCG: 1 TABLET ORAL at 08:49

## 2024-07-04 RX ADMIN — PREDNISONE 20 MG: 20 TABLET ORAL at 08:49

## 2024-07-04 NOTE — PLAN OF CARE
Goal Outcome Evaluation:  Plan of Care Reviewed With: patient        Progress: no change  Outcome Evaluation: OT re-eval completed.  Pt A&Ox4 c/o generalized soreness s/p surgery on 7/3. Pt is agreeable to therapy & attempting chair transfer today. She required Jose R for bed mobility and was able to stand on first attempt requiring only Jose R. Attempted bed>chair however mid transfer, her BLEs buckled and she required maxA x2 to stay upright and safely return to the bed. She is able to roll L&R with SBA only utilizing the bed rails. She would benefit from cont OT to progress her strength & act cara for increased safety & I with ADLs. Recommend SNF given her high fall risk.

## 2024-07-04 NOTE — THERAPY RE-EVALUATION
Patient Name: Tawny Shin  : 1955    MRN: [id number]                              Today's Date: 2024       Admit Date: 2024    Visit Dx:     ICD-10-CM ICD-9-CM   1. Acute UTI (urinary tract infection)  N39.0 599.0   2. Altered mental status, unspecified altered mental status type  R41.82 780.97   3. Open wound of right buttock, sequela  S31.819S 906.0   4. Cyst of buttocks  L72.9 706.2   5. Dysphagia, unspecified type  R13.10 787.20   6. Impaired mobility [Z74.09]  Z74.09 799.89   7. Hydronephrosis with urinary obstruction due to ureteral calculus  N13.2 592.1     591   8. Acute cholecystitis  K81.0 575.0   9. Acute congestive heart failure, unspecified heart failure type  I50.9 428.0     Patient Active Problem List   Diagnosis    T12 compression fracture, initial encounter    Chronic embolism and thrombosis of unspecified deep veins of left lower extremity    Chronic anticoagulation    Iron deficiency anemia    Osteoporosis    E coli bacteremia    Epidural hematoma    Pleural effusion, left    Functional neurological symptom disorder with weakness or paralysis    Class 1 obesity due to excess calories with serious comorbidity and body mass index (BMI) of 33.0 to 33.9 in adult    Rheumatoid arthritis involving multiple sites    Chronic heart failure with preserved ejection fraction    Venous insufficiency    Coronary artery disease involving native coronary artery of native heart without angina pectoris    Sick sinus syndrome    Essential hypertension    Pressure injury of skin of heel    Chronic pain    Anemia of chronic disease    Cholelithiasis    Pressure injury of skin of buttock    Near functional paraplegia    Skin cancer    Squamous cell carcinoma of back    Hematoma    Right-sided chest wall pain    Hyperlipidemia LDL goal <70    Malodorous urine    Stage 3b chronic kidney disease    Cyst of buttocks    Sepsis due to Escherichia coli without acute organ dysfunction    Generalized  weakness    Paralysis    History of urinary retention    Bacteremia due to Enterobacter species    Bacteremia    Hypothyroidism    Abnormal CT of the abdomen    Presence of cardiac pacemaker    Altered mental status    UTI (urinary tract infection)    Esophageal dysphagia    Hydronephrosis with urinary obstruction due to ureteral calculus    Acute cholecystitis    Acute gout     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    Elevated cholesterol     Eustachian tube dysfunction     Heart disease     Herpes simplex     History of transfusion     Hyperlipidemia     Hypertension     Hypothyroidism 12/23/2020    Intrinsic asthma 12/23/2020    Knee dislocation     Labral tear of right hip joint     Laryngitis sicca     Laryngitis, chronic     Left carotid bruit 03/09/2016    MI (myocardial infarction)     Myalgia due to statin 06/25/2019    Open wound of right hip 09/14/2021    Osteomyelitis of right femur 07/06/2021    Otorrhea     Pacemaker 11/17/2016    Primary osteoarthritis of left knee 12/23/2020    Psoriasis vulgaris 12/23/2020    S/P coronary artery stent placement 03/09/2016    Sensorineural hearing loss     Seropositive rheumatoid arthritis of multiple sites 12/27/2019    Overview:  -myochrysine '93-'96 -methotrexate '96--->11/98;r/s  restarted 2/99--> 8/14 (anemia) -sulfasalazine- not effective -penicillamine 6/98-->10/98; no effect -leflunomide 11/98--> - Humira '13-->didn't take - Enbrel  "12/14-->3/15- no effect!   Last Assessment & Plan:  - \"aching all over\" because she had to be off her anti-rheumatic drugs for 2 weeks in preparation for her R knee surgery - he    Sick sinus syndrome 12/27/2019    Sjogren's disease     Spondylolisthesis of lumbar region 01/17/2018    Syncope, recurrent 02/08/2021    Urinary tract infection     UTI (urinary tract infection) 04/19/2024     Past Surgical History:   Procedure Laterality Date    A-V CARDIAC PACEMAKER INSERTION  2016    ATRIAL CARDIAC PACEMAKER INSERTION      CARDIAC CATHETERIZATION      CATARACT EXTRACTION      CERVICAL CORPECTOMY N/A 3/3/2021    Procedure: CERVICAL 6 CORPECTOMY WITH TITANIUM CAGE WITH NEURO MONITORING;  Surgeon: Bandar Shea MD;  Location:  [facility];  Service: Neurosurgery;  Laterality: N/A;    COLONOSCOPY  11/08/2011    One fold in the ascending colon which showed ulcer otherwise normal exam    COLONOSCOPY  11/12/2004    Normal exam repeat in five years    CORONARY ANGIOPLASTY WITH STENT PLACEMENT      X 2; 2013 & 2014    ENDOSCOPY  07/10/2014    Normal exam    ENDOSCOPY N/A 5/30/2024    Procedure: ESOPHAGOGASTRODUODENOSCOPY WITH ANESTHESIA;  Surgeon: Taiwo Sanchez MD;  Location: [facility] ENDOSCOPY;  Service: Gastroenterology;  Laterality: N/A;  preop; dysphagia  postop: balloon dilation  PCP Jesus Manuel jeff    FLAP LEG Right 9/14/2021    Procedure: RIGHT GLUTEAL FASCIOCUTANEOUS ADVANCEMENT FLAP AND RIGHT TENSOR FASCIAL JESSICA FLAP;  Surgeon: Amadeo Turner MD;  Location:  [facility];  Service: Plastics;  Laterality: Right;    HIP ABDUCTION TENOTOMY BILATERAL Right 1/14/2021    Procedure: RIGHT HIP GLUTEUS MEDLUS / MINIMUS REPAIR, POSSIBLE ACHILLES ALLOGRAFT;  Surgeon: Nino Carlson MD;  Location:  [facility];  Service: Orthopedics;  Laterality: Right;    INCISION AND DRAINAGE ABSCESS Right 6/4/2022    Procedure: INCISION AND DRAINAGE ABSCESS right hip;  Surgeon: Magda Salcido MD;  Location:  [facility];  Service: General;  " Laterality: Right;    INCISION AND DRAINAGE ABSCESS Right 6/10/2022    Procedure: RIGHT HIP INCISION AND DRAINAGE. MD NEEDS 3L VANC IRRIGATION, CURRETTES, DAICANS, KERLEX ROLLS;  Surgeon: Amadeo Turner MD;  Location:  [facility];  Service: Plastics;  Laterality: Right;    INCISION AND DRAINAGE HIP Right 2/9/2021    Procedure: HIP INCISION AND DRAINAGE;  Surgeon: Nino Carlson MD;  Location:  [facility];  Service: Orthopedics;  Laterality: Right;    INCISION AND DRAINAGE LEG Right 10/24/2021    Procedure: INCISION AND DRAINAGE LOWER EXTREMITY;  Surgeon: Amadeo Turner MD;  Location:  [facility];  Service: Plastics;  Laterality: Right;    INCISION AND DRAINAGE OF WOUND Right 7/8/2021    Procedure: INCISION AND DRAINAGE WOUND RIGHT HIP;  Surgeon: James Huntley MD;  Location:  [facility];  Service: Orthopedics;  Laterality: Right;    JOINT REPLACEMENT      KYPHOPLASTY WITH BIOPSY Bilateral 10/26/2021    Procedure: THOARCIC 12 KYPHOPLASTY WITH BIOPSY;  Surgeon: Bandar Shea MD;  Location:  [facility];  Service: Neurosurgery;  Laterality: Bilateral;    LEG DEBRIDEMENT Right 9/14/2021    Procedure: DEBRIDEMENT OF RIGHT HIP WOUND, RIGHT GLUTEAL FASCIOCUTANEOUS ADVANCEMENT FLAP AND RIGHT TENSOR FASCIAL JESSICA FLAP;  Surgeon: Amadeo Turner MD;  Location:  [facility];  Service: Plastics;  Laterality: Right;    LUMBAR DISCECTOMY Right 3/23/2021    Procedure: LUMBAR DISCECTOMY MICRO, Lumbar 1/2 right;  Surgeon: Bandar Shea MD;  Location:  [facility];  Service: Neurosurgery;  Laterality: Right;    LUMBAR FUSION N/A 1/19/2018    Procedure: L3-4,L4-5 DECOMPRESSION, POSTERIOR SPINAL FUSION WITH INSTRUMENTATION;  Surgeon: Fortino Oropeza MD;  Location:  [facility];  Service:     LUMBAR LAMINECTOMY WITH FUSION Left 1/17/2018    Procedure: LEFT L3-4 L4-5 LATERAL LUMBAR INTERBODY FUSION;  Surgeon: Fortino Oropeza MD;  Location:  [facility];  Service:     MASS EXCISION Right 4/23/2024    Procedure: RIGHT  BUTTOCK MASS EXCISION;  Surgeon: Moises Keyes MD;  Location:  [facility];  Service: General;  Laterality: Right;    MYRINGOTOMY W/ TUBES  09/04/2014    TUBES NO LONGER IN PLACE    OTHER SURGICAL HISTORY      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 so hardware was removed and spacers were placed    REPLACEMENT TOTAL KNEE Right     URETEROSCOPY LASER LITHOTRIPSY WITH STENT INSERTION N/A 6/21/2024    Procedure: URETEROSCOPY LASER LITHOTRIPSY WITH STENT INSERTION LEFT;  Surgeon: Ky Plascencia MD;  Location:  [facility];  Service: Urology;  Laterality: N/A;      General Information       Row Name 07/04/24 1130          Physical Therapy Time and Intention    Document Type re-evaluation  -KR     Mode of Treatment physical therapy  -KR       Row Name 07/04/24 1130          General Information    Patient Profile Reviewed yes  kelly performed 7/3  -KR     Existing Precautions/Restrictions fall  -KR     Barriers to Rehab medically complex;previous functional deficit  -KR       Row Name 07/04/24 1130          Cognition    Orientation Status (Cognition) oriented x 4  -KR       Row Name 07/04/24 1130          Safety Issues, Functional Mobility    Safety Issues Affecting Function (Mobility) safety precaution awareness  -KR     Impairments Affecting Function (Mobility) balance;pain;strength;postural/trunk control  -KR               User Key  (r) = Recorded By, (t) = Taken By, (c) = Cosigned By      Initials Name Provider Type    KR Yue Hale PT DPT Physical Therapist                   Mobility       Row Name 07/04/24 1130          Bed Mobility    Bed Mobility supine-sit;sit-supine  -KR     Rolling Left [place] (Bed Mobility) standby assist;verbal cues  -KR     Rolling Right [place] (Bed Mobility) standby assist;verbal cues  -KR     Scooting/Bridging [place] (Bed Mobility) maximum assist (25% patient effort);2 person assist  -KR     Supine-Sit [place] (Bed Mobility) minimum assist (75% patient effort)  -KR      Sit-Supine [place] (Bed Mobility) dependent (less than 25% patient effort);2 person assist  -KR     Assistive Device (Bed Mobility) bed rails;draw sheet;head of bed elevated  -KR       Row Name 07/04/24 1130          Bed-Chair Transfer    Comment, (Bed-Chair Transfer) attempted bed to chair, pt got half way and BLEs buckled needing max A X 2 to stay upright and pivot back to bed   -KR       Row Name 07/04/24 1130          Sit-Stand Transfer    Sit-Stand [place] (Transfers) minimum assist (75% patient effort)  -KR     Assistive Device (Sit-Stand Transfers) walker, 4-wheeled  -KR       Row Name 07/04/24 1130          Gait/Stairs (Locomotion)    Patient was able to Ambulate no, other medical factors prevent ambulation  -KR     Reason Patient was unable to Ambulate Excessive Weakness  -KR               User Key  (r) = Recorded By, (t) = Taken By, (c) = Cosigned By      Initials Name Provider Type    Yue Mclaughlin, PT DPT Physical Therapist                   Obj/Interventions       Row Name 07/04/24 1130          Range of Motion Comprehensive    General Range of Motion bilateral lower extremity ROM WFL  -KR       Row Name 07/04/24 1130          Strength Comprehensive (MMT)    General Manual Muscle Testing (MMT) Assessment lower extremity strength deficits identified  -KR       Row Name 07/04/24 1130          Balance    Static Sitting Balance supervision  -KR     Dynamic Sitting Balance supervision  -KR     Position, Sitting Balance unsupported;sitting edge of bed  -KR     Static Standing Balance maximum assist;2-person assist  -KR     Dynamic Standing Balance maximum assist;2-person assist  -KR     Comment, Balance initially pt was min A X 2 for static and dynamic standing balance, then quickly became max X 2 with RW   -KR               User Key  (r) = Recorded By, (t) = Taken By, (c) = Cosigned By      Initials Name Provider Type    Yue Mclaughlin PT DPT Physical Therapist                    Goals/Plan       Row Name 07/04/24 1130          Bed Mobility Goal 1 (PT)    Activity/Assistive Device (Bed Mobility Goal 1, PT) bed mobility activities, all  -KR     [place] Level/Cues Needed (Bed Mobility Goal 1, PT) modified independence  -KR     Time Frame (Bed Mobility Goal 1, PT) by discharge  -KR     Progress/Outcomes (Bed Mobility Goal 1, PT) goal ongoing  -KR       Row Name 07/04/24 1130          Transfer Goal 1 (PT)    Activity/Assistive Device (Transfer Goal 1, PT) sit-to-stand/stand-to-sit;bed-to-chair/chair-to-bed;walker, rolling  -KR     [place] Level/Cues Needed (Transfer Goal 1, PT) minimum assist (75% or more patient effort)  -KR     Time Frame (Transfer Goal 1, PT) by discharge  -KR     Progress/Outcome (Transfer Goal 1, PT) goal ongoing  -KR       Row Name 07/04/24 1130          Gait Training Goal 1 (PT)    Activity/Assistive Device (Gait Training Goal 1, PT) gait (walking locomotion);assistive device use;decrease fall risk;increase endurance/gait distance;improve balance and speed;walker, rolling  -KR     [place] Level (Gait Training Goal 1, PT) minimum assist (75% or more patient effort)  -KR     Distance (Gait Training Goal 1, PT) 10 feet without knees buckling  -KR     Time Frame (Gait Training Goal 1, PT) by discharge  -KR     Progress/Outcome (Gait Training Goal 1, PT) goal revised this date  -KR       Row Name 07/04/24 1130          Problem Specific Goal 1 (PT)    Problem Specific Goal 1 (PT) Pt will perform static standing balance with RW and SBA for 3 minutes.  -KR     Time Frame (Problem Specific Goal 1, PT) by discharge  -KR     Progress/Outcome (Problem Specific Goal 1, PT) new goal  -KR       Row Name 07/04/24 1130          Therapy Assessment/Plan (PT)    Planned Therapy Interventions (PT) balance training;bed mobility training;gait training;home exercise program;postural re-education;patient/family education;neuromuscular re-education;strengthening;stretching;transfer  "training  -KR               User Key  (r) = Recorded By, (t) = Taken By, (c) = Cosigned By      Initials Name Provider Type    Yue Mclaughlin, PT DPT Physical Therapist                   Clinical Impression       Row Name 07/04/24 1130          Pain    Pre/Posttreatment Pain Comment \"just sore\"  -KR     Pain Intervention(s) Repositioned  -KR       Row Name 07/04/24 1130          Plan of Care Review    Plan of Care Reviewed With patient  -KR     Outcome Evaluation Physical therapy re-evaluation performed. Pt reports \"just sore\" after surgery. Attempted bed-chair, but had significant B LE weakness mid transfer and needed Max A X 2 to return to bed. Pt SBA for rolling in bed. Skilled PT needed to continue to work on strengthening, mobility and transfers to decrease risk for falls and improve independence.  -KR       Row Name 07/04/24 1130          Therapy Assessment/Plan (PT)    Rehab Potential (PT) good, to achieve stated therapy goals  -KR     Criteria for Skilled Interventions Met (PT) yes;meets criteria;skilled treatment is necessary  -KR     Therapy Frequency (PT) 2 times/day  -KR     Predicted Duration of Therapy Intervention (PT) until d/c  -KR       Row Name 07/04/24 1130          Positioning and Restraints    Pre-Treatment Position in bed  -KR     Post Treatment Position bed  -KR     In Bed fowlers;call light within reach;encouraged to call for assist;with family/caregiver;notified nsg  -KR               User Key  (r) = Recorded By, (t) = Taken By, (c) = Cosigned By      Initials Name Provider Type    Yue Mclaughlin, PT DPT Physical Therapist                   Outcome Measures       Row Name 07/04/24 1130 07/04/24 0830       How much help from another person do you currently need...    Turning from your back to your side while in flat bed without using bedrails? 3  -KR 3  -KS    Moving from lying on back to sitting on the side of a flat bed without bedrails? 2  -KR 3  -KS    Moving to and from " a bed to a chair (including a wheelchair)? 1  -KR 2  -KS    Standing up from a chair using your arms (e.g., wheelchair, bedside chair)? 3  -KR 2  -KS    Climbing 3-5 steps with a railing? 1  -KR 2  -KS    To walk in hospital room? 1  -KR 2  -KS    AM-PAC 6 Clicks Score (PT) 11  -KR 14  -KS    Highest Level of Mobility Goal 4 --> Transfer to chair/commode  -KR 4 --> Transfer to chair/commode  -KS      Row Name 07/04/24 1200 07/04/24 1130       Functional Assessment    Outcome Measure Options AM-PAC 6 Clicks Daily Activity (OT)  -EC AM-PAC 6 Clicks Basic Mobility (PT)  -KR              User Key  (r) = Recorded By, (t) = Taken By, (c) = Cosigned By      Initials Name Provider Type    Yue Mclaughlin, PT DPT Physical Therapist    Ewelina Ferrari, RN Registered Nurse    Ashley Nash, OTR/L Occupational Therapist                                 Physical Therapy Education       Title: PT OT SLP Therapies (Done)       Topic: Physical Therapy (Done)       Point: Mobility training (Done)       Learning Progress Summary             Patient Acceptance, E, VU by KR at 7/4/2024 1208    Acceptance, E, VU by KS at 6/20/2024 1715    Acceptance, E, VU by MS at 6/18/2024 1502    Comment: role of PT in her care                         Point: Home exercise program (Done)       Learning Progress Summary             Patient Acceptance, E, VU by KS at 6/20/2024 1715    Acceptance, E,D,TB, VU,DU,NR by  at 6/15/2024 1514    Acceptance, E,TB,D, VU,DU,NR by  at 6/14/2024 1550                         Point: Body mechanics (Done)       Learning Progress Summary             Patient Acceptance, E, VU by KS at 6/20/2024 1715    Acceptance, E,D,TB, VU,DU,NR by  at 6/15/2024 1514    Acceptance, E,TB,D, VU,DU,NR by  at 6/14/2024 1550                         Point: Precautions (Done)       Learning Progress Summary             Patient Acceptance, E, VU by KR at 7/4/2024 1208    Acceptance, E, VU by KS at 6/20/2024 1715     "Acceptance, E,D,TB, VU,DU,NR by  at 6/15/2024 1514    Acceptance, E,TB,D, VU,DU,NR by  at 6/14/2024 1550                                         User Key       Initials Effective Dates Name Provider Type Discipline     06/03/24 -  Yue Hale, PT DPT Physical Therapist PT    MS 07/11/23 -  Nicole Olson, PT, DPT, NCS Physical Therapist PT    KS 02/27/23 -  Ewelina Morales RN Registered Nurse Nurse     10/17/23 -  Stephanie Doll RN Registered Nurse Nurse                  PT Recommendation and Plan  Planned Therapy Interventions (PT): balance training, bed mobility training, gait training, home exercise program, postural re-education, patient/family education, neuromuscular re-education, strengthening, stretching, transfer training  Plan of Care Reviewed With: patient  Outcome Evaluation: Physical therapy re-evaluation performed. Pt reports \"just sore\" after surgery. Attempted bed-chair, but had significant B LE weakness mid transfer and needed Max A X 2 to return to bed. Pt SBA for rolling in bed. Skilled PT needed to continue to work on strengthening, mobility and transfers to decrease risk for falls and improve independence.     Time Calculation:         PT Charges       Row Name 07/04/24 1208             Time Calculation    Start Time 1130  -KR      Stop Time 1200  -KR      Time Calculation (min) 30 min  -KR      PT Received On 07/04/24  -KR      PT Goal Re-Cert Due Date 07/14/24  -KR                User Key  (r) = Recorded By, (t) = Taken By, (c) = Cosigned By      Initials Name Provider Type    KR Yue Hale, PT DPT Physical Therapist                  Therapy Charges for Today       Code Description Service Date Service Provider Modifiers Qty    [id number]  PT RE-EVAL ESTABLISHED PLAN 2 7/4/2024 Yue Hale, PT DPT GP 1            PT G-Codes  Outcome Measure Options: AM-PAC 6 Clicks Daily Activity (OT)  AM-PAC 6 Clicks Score (PT): 11  AM-PAC 6 Clicks Score (OT): 15  PT " Discharge Summary  Anticipated Discharge Disposition (PT): home with 24/7 care, home with home health, skilled nursing facility    Yue Hale, PT DPT  7/4/2024

## 2024-07-04 NOTE — PLAN OF CARE
"Goal Outcome Evaluation:  Plan of Care Reviewed With: patient           Outcome Evaluation: Physical therapy re-evaluation performed. Pt reports \"just sore\" after surgery. Attempted bed-chair, but had significant B LE weakness mid transfer and needed Max A X 2 to return to bed. Pt SBA for rolling in bed. Skilled PT needed to continue to work on strengthening, mobility and transfers to decrease risk for falls and improve independence.      Anticipated Discharge Disposition (PT): home with 24/7 care, home with home health, skilled nursing facility                        "

## 2024-07-04 NOTE — PROGRESS NOTES
[facility]  INPATIENT PROGRESS NOTE    Patient Name: Tawny Shin  Date of Admission: 6/12/2024  Today's Date: 07/04/24  Length of Stay: 13  Primary Care Physician: Moises Oliveira MD    Subjective   Chief Complaint: Weakness .  Abdomen pain.  HPI   Added pain pain from status post cholecystectomy.  Discussed with Dr. Keyes, probably start back on Eliquis tomorrow.  Patient is on room air.  Slight decrease in hemoglobin- No sign of acute bleed.    Review of Systems   Constitutional:  Positive for activity change, appetite change and fatigue. Negative for chills and fever.   HENT:  Negative for hearing loss, nosebleeds, tinnitus and trouble swallowing.    Eyes:  Negative for visual disturbance.   Respiratory:  Negative for cough, chest tightness, shortness of breath and wheezing.    Cardiovascular:  Negative for chest pain, palpitations and leg swelling.   Gastrointestinal: Right upper abdomen pain from surgery.  Endocrine: Negative for cold intolerance, heat intolerance, polydipsia, polyphagia and polyuria.   Genitourinary:  Negative for decreased urine volume, difficulty urinating, dysuria, flank pain, frequency and hematuria.   Musculoskeletal:  Positive for arthralgias, gait problem and myalgias. Negative for joint swelling.   Skin:  Negative for rash.   Allergic/Immunologic: Negative for immunocompromised state.   Neurological:  Positive for weakness. Negative for dizziness, syncope, light-headedness and headaches.   Hematological:  Negative for adenopathy. Does not bruise/bleed easily.  All pertinent negatives and positives are as above. All other systems have been reviewed and are negative unless otherwise stated.     Objective    Temp:  [97.5 °F (36.4 °C)-98.6 °F (37 °C)] 97.9 °F (36.6 °C)  Heart Rate:  [62-72] 72  Resp:  [14-16] 16  BP: ()/(49-80) 131/49  Physical Exam  Vitals and nursing note reviewed.   Constitutional:       Comments:  Chronically ill.  Deconditioning.   HENT:      Head: Normocephalic.   Eyes:      Conjunctiva/sclera: Conjunctivae normal.      Pupils: Pupils are equal, round, and reactive to light.   Cardiovascular:      Rate and Rhythm: Normal rate and regular rhythm.      Heart sounds: Normal heart sounds.   Pulmonary:      Effort: Pulmonary effort is normal. No respiratory distress.      Breath sounds: Normal breath sounds.   Abdominal:      General: Bowel sounds are normal. There is no distension.      Palpations: Abdomen is soft.  Right upper abdomen pain, from surgery.     Comments: Obesity.     Musculoskeletal:         General: No swelling.      Cervical back: Neck supple.   Skin:     General: Skin is warm and dry.      Capillary Refill: Capillary refill takes 2 to 3 seconds.      Findings: No rash.   Neurological:      Mental Status: She is alert and oriented to person, place, and time.      Motor: Weakness present.      Coordination: Coordination abnormal.      Gait: Gait abnormal.   Psychiatric:         Mood and Affect: Mood normal.         Behavior: Behavior normal.         Thought Content: Thought content normal.       Results Review:  I have reviewed the labs, radiology results, and diagnostic studies.    Laboratory Data:   Results from last 7 days   Lab Units 07/04/24  0603 07/01/24  0424 06/30/24  0552   WBC 10*3/mm3 4.81 5.42 4.29   HEMOGLOBIN g/dL 7.5* 8.5* 8.0*   HEMATOCRIT % 25.3* 29.1* 26.9*   PLATELETS 10*3/mm3 206 194 142        Results from last 7 days   Lab Units 07/04/24  0603 07/01/24  0424 06/30/24  0552 06/29/24  0447   SODIUM mmol/L 136 136 139 138   POTASSIUM mmol/L 5.0 4.3 3.8 3.8   CHLORIDE mmol/L 103 102 105 103   CO2 mmol/L 24.0 23.0 26.0 27.0   BUN mg/dL 19 15 12 13   CREATININE mg/dL 0.89 0.91 0.87 1.10*   CALCIUM mg/dL 8.9 9.0 8.6 8.7   BILIRUBIN mg/dL 0.2  --   --  0.2   ALK PHOS U/L 82  --   --  86   ALT (SGPT) U/L 26  --   --  21   AST (SGOT) U/L 43*  --   --  25   GLUCOSE mg/dL 122* 123* 92  "92       Culture Data:   No results found for: \"BLOODCX\", \"URINECX\", \"WOUNDCX\", \"MRSACX\", \"RESPCX\", \"STOOLCX\"    Radiology Data:   Imaging Results (Last 24 Hours)       ** No results found for the last 24 hours. **            I have reviewed the patient's current medications.     Assessment/Plan   Assessment  Active Hospital Problems    Diagnosis     **Altered mental status     Acute gout     Esophageal dysphagia     UTI (urinary tract infection)     Hydronephrosis with urinary obstruction due to ureteral calculus     Acute cholecystitis     Hypothyroidism     Stage 3b chronic kidney disease     Near functional paraplegia     Cholelithiasis     Essential hypertension     Sick sinus syndrome     Coronary artery disease involving native coronary artery of native heart without angina pectoris     Venous insufficiency     Rheumatoid arthritis involving multiple sites     Chronic heart failure with preserved ejection fraction     Class 1 obesity due to excess calories with serious comorbidity and body mass index (BMI) of 33.0 to 33.9 in adult     Functional neurological symptom disorder with weakness or paralysis     Chronic anticoagulation     Chronic embolism and thrombosis of unspecified deep veins of left lower extremity        Treatment Plan  Gallstone, within the gallbladder neck/dysfunctional gallbladder.  Mild distended gallbladder.  Right upper quadrant tenderness.  Ultrasound of the gallbladder-Cholelithiasis with small shadowing stones and sludge within the region of the gallbladder neck- Moderate distention of the gallbladder- however no gallbladder wall thickening or pericholecystic fluid identified- No biliary dilatation.  HIDA scan-Nonvisualization of the gallbladder on images obtained up to 90 minutes- Cystic duct obstruction and acute cholecystitis cannot be excluded on the basis of this exam,  No evidence of common bile duct obstruction with emptying of activity into the C-loop of the duodenum and " proximal jejunum, Gallstones and sludge were demonstrated on recent ultrasound- combined with nonvisualization of the gallbladder exclude performance of an ejection fraction..  Discussed with general surgery Dr. Jo-plan for gallbladder removal this coming Tuesday, DC Eliquis today 6/22/2024 start Lovenox today, nursing communication to stop Lovenox 24 hours before surgery on Monday.  Dr. Jo also recommended cardiac clearance for surgery.   Cholecystectomy delay due to waxing and waning encephalopathy and concern for further cognitive decline.    Status post cholecystectomy.    Hypertension/sick sinus syndrome/venous insufficiency/CHF.  Start back on Eliquis tomorrow discussed with general surgery today.  Aspirin . Coreg . Lasix.  Imdur.  Cozaar . Aldactone.  Nitro as needed.     Recurrent UTI/neurogenic bladder.  Patient finished cefepime/Flagyl antibiotics.  Continue HipRex.  Repeated UA, negative for bacteria.  Consult ID.  Urology consulted and following.   CT scan abdomen pelvic . Obstructive uropathy on the left with mild to moderate dilatation of the upper tracts of the left kidney and left ureter into the true pelvis where there is a 5 x 6 mm calculus within the left ureter approximately 4 to 5 cm above the UVJ- right ureter is decompressed and normal in appearance- No evidence of renal mass. No perinephric fluid collection present, gallbladder is mildly distended- gallstones within the gallbladder neck,  No pericholecystic inflammatory changes or biliary dilatation, Constipation,  No obstruction or free air, Normal appendix, Calcified fibroid within the lower uterine segment, Mild constipation, No obstruction or free air, Normal appendix, Small fat-containing periumbilical hernia, Previous kyphoplasty at T12, Previous fusion at L3-L4 and L4-L5, Left basilar atelectasis..  Recommendation from urology-evaluation of neurogenic bladder at a university setting like [place].     Obstructing renal  calculi left side/moderate dilated of left ureter.  Callus 5 x 6 mm and left 4 to 5 cm above the UVJ junction.  Status post 6/21/2024 urethroscope laser lithotripsy with stent insertion left, remove stent and Horan cath 6/29/2024.     AMS improved. KS treated.  Patient is back to baseline.     Gout . New problem RLE pain with elevated uric acid level, continue colchicine.  Continue allopurinol.     Reflux/esophageal dysphagia. Dysphagia recent esophageal stretching, GI evaluated and recommended to continue outpatient follow-up.  SLP evaluated and recommended to continue regular diet.  Protonix . Zofran as needed.     Altered mental status/encephalopathy, improved  Consult neurology  She does not seem to receive anything overnight that could cause worsening mental status changes.  Metronidazole has an incidence of encephalopathy but average is a 28 days and worsening patients with liver disease which makes shortness seem to have at this point.  CT scan the head-No acute intracranial abnormality, Chronic sinusitis and left mastoid effusion.  Repeat this morning.  Medications reviewed  Chest x-ray-Stable chest exam without acute process, No visualized infiltrate.   Is current on 2 L oxygen.     Rheumatoid arthritis.  Rheumatoid factor quantitative normal.  Arava.  Prednisone.  Lovenox full dose.   Doppler right lower extremity and NISREEN normal.  Pain control.     Hypothyroidism.  Synthroid.     Chronic stage IIIb renal failure.  Creatinine normalized.  Hold Lasix.      Hypomagnesia. Resolved.     Hyponatremia.  Resolved.     Hypokalemia.  Resolved.  Magnesium normal.     Right gluteal wound.  Consult wound care.     Rheumatoid arthritis. Arava.     Depression/anxiety.  Cymbalta.     Postmenopausal.  Estradiol cream.     Anemia.  Hemoglobin decreased.  Iron sulfate.  Folic acid.  No sign of acute bleed.     Pain . lidocaine patch.  Voltaren gel.  Ultram as needed.     Chronic DVT.  DC Eliquis due to possible surgery, on  Lovenox.     Functional neurological symptom disorder with paraplegia.     Nutrition.  Cardiac diet.  Vitamin C.  Multivitamins.  Regular/house diet.  Boost supplement.     Deconditioning.  PT and OT consult.  Patient use a walker and bedbound at baseline.     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  Last urine culture shows no growth.  Blood culture-no growth for 5 days.     Dr. Shin's mom.  Patient already have home health at home.  Patient request for lift at discharge.     Medical Decision Making  Number and Complexity of problems: Recurrent UTI/altered mental status/reflux/rheumatoid arthritis/chronic 3B renal failure  Differential Diagnosis: None     Conditions and Status        Condition is unchanged.     MDM Data  External documents reviewed: Previous note .  Cardiac tracing (EKG, telemetry) interpretation: Sinus.  Radiology interpretation: X-ray/CT scan of the head  Labs reviewed: Laboratory  Any tests that were considered but not ordered: Lab in a.m.     Decision rules/scores evaluated (example [id number], [place], etc): None     Discussed with: Patient and family     Care Planning  Shared decision making: Patient and family  Code status and discussions: DNR     Disposition  Social Determinants of Health that impact treatment or disposition: From home  1 to 3 days.    Electronically signed by Saulo Ambriz MD, 07/04/24, 13:01 CDT.

## 2024-07-04 NOTE — THERAPY RE-EVALUATION
Acute Care - Occupational Therapy Re-Evaluation  [facility]     Patient Name: Tawny Shin  : 1955  MRN: [id number]  Today's Date: 2024     Date of Referral to OT: 24       Admit Date: 2024       ICD-10-CM ICD-9-CM   1. Acute UTI (urinary tract infection)  N39.0 599.0   2. Altered mental status, unspecified altered mental status type  R41.82 780.97   3. Open wound of right buttock, sequela  S31.819S 906.0   4. Cyst of buttocks  L72.9 706.2   5. Dysphagia, unspecified type  R13.10 787.20   6. Impaired mobility [Z74.09]  Z74.09 799.89   7. Hydronephrosis with urinary obstruction due to ureteral calculus  N13.2 592.1     591   8. Acute cholecystitis  K81.0 575.0   9. Acute congestive heart failure, unspecified heart failure type  I50.9 428.0     Patient Active Problem List   Diagnosis    T12 compression fracture, initial encounter    Chronic embolism and thrombosis of unspecified deep veins of left lower extremity    Chronic anticoagulation    Iron deficiency anemia    Osteoporosis    E coli bacteremia    Epidural hematoma    Pleural effusion, left    Functional neurological symptom disorder with weakness or paralysis    Class 1 obesity due to excess calories with serious comorbidity and body mass index (BMI) of 33.0 to 33.9 in adult    Rheumatoid arthritis involving multiple sites    Chronic heart failure with preserved ejection fraction    Venous insufficiency    Coronary artery disease involving native coronary artery of native heart without angina pectoris    Sick sinus syndrome    Essential hypertension    Pressure injury of skin of heel    Chronic pain    Anemia of chronic disease    Cholelithiasis    Pressure injury of skin of buttock    Near functional paraplegia    Skin cancer    Squamous cell carcinoma of back    Hematoma    Right-sided chest wall pain    Hyperlipidemia LDL goal <70    Malodorous urine    Stage 3b chronic kidney disease    Cyst of buttocks    Sepsis due to  Escherichia coli without acute organ dysfunction    Generalized weakness    Paralysis    History of urinary retention    Bacteremia due to Enterobacter species    Bacteremia    Hypothyroidism    Abnormal CT of the abdomen    Presence of cardiac pacemaker    Altered mental status    UTI (urinary tract infection)    Esophageal dysphagia    Hydronephrosis with urinary obstruction due to ureteral calculus    Acute cholecystitis    Acute gout     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    Elevated cholesterol     Eustachian tube dysfunction     Heart disease     Herpes simplex     History of transfusion     Hyperlipidemia     Hypertension     Hypothyroidism 12/23/2020    Intrinsic asthma 12/23/2020    Knee dislocation     Labral tear of right hip joint     Laryngitis sicca     Laryngitis, chronic     Left carotid bruit 03/09/2016    MI (myocardial infarction)     Myalgia due to statin 06/25/2019    Open wound of right hip 09/14/2021    Osteomyelitis of right femur 07/06/2021    Otorrhea     Pacemaker 11/17/2016    Primary osteoarthritis of left knee 12/23/2020    Psoriasis vulgaris 12/23/2020    S/P coronary artery stent placement 03/09/2016    Sensorineural hearing loss     Seropositive rheumatoid arthritis of multiple sites 12/27/2019    Overview:  -myochrysine '93-'96 -methotrexate '96--->11/98;r/s  restarted 2/99--> 8/14 (anemia) -sulfasalazine- not effective -penicillamine 6/98-->10/98; no effect  "-leflunomide 11/98--> - Humira '13-->didn't take - Enbrel 12/14-->3/15- no effect!   Last Assessment & Plan:  - \"aching all over\" because she had to be off her anti-rheumatic drugs for 2 weeks in preparation for her R knee surgery - he    Sick sinus syndrome 12/27/2019    Sjogren's disease     Spondylolisthesis of lumbar region 01/17/2018    Syncope, recurrent 02/08/2021    Urinary tract infection     UTI (urinary tract infection) 04/19/2024     Past Surgical History:   Procedure Laterality Date    A-V CARDIAC PACEMAKER INSERTION  2016    ATRIAL CARDIAC PACEMAKER INSERTION      CARDIAC CATHETERIZATION      CATARACT EXTRACTION      CERVICAL CORPECTOMY N/A 3/3/2021    Procedure: CERVICAL 6 CORPECTOMY WITH TITANIUM CAGE WITH NEURO MONITORING;  Surgeon: Bandar Shea MD;  Location: [facility] OR;  Service: Neurosurgery;  Laterality: N/A;    COLONOSCOPY  11/08/2011    One fold in the ascending colon which showed ulcer otherwise normal exam    COLONOSCOPY  11/12/2004    Normal exam repeat in five years    CORONARY ANGIOPLASTY WITH STENT PLACEMENT      X 2; 2013 & 2014    ENDOSCOPY  07/10/2014    Normal exam    ENDOSCOPY N/A 5/30/2024    Procedure: ESOPHAGOGASTRODUODENOSCOPY WITH ANESTHESIA;  Surgeon: Taiwo Sanchez MD;  Location: [facility] ENDOSCOPY;  Service: Gastroenterology;  Laterality: N/A;  preop; dysphagia  postop: balloon dilation  PCP Jesus Manuel jeff    FLAP LEG Right 9/14/2021    Procedure: RIGHT GLUTEAL FASCIOCUTANEOUS ADVANCEMENT FLAP AND RIGHT TENSOR FASCIAL JESSICA FLAP;  Surgeon: Amadeo Turner MD;  Location: [facility] OR;  Service: Plastics;  Laterality: Right;    HIP ABDUCTION TENOTOMY BILATERAL Right 1/14/2021    Procedure: RIGHT HIP GLUTEUS MEDLUS / MINIMUS REPAIR, POSSIBLE ACHILLES ALLOGRAFT;  Surgeon: Nino Carlson MD;  Location: [facility] OR;  Service: Orthopedics;  Laterality: Right;    INCISION AND DRAINAGE ABSCESS Right 6/4/2022    Procedure: INCISION AND DRAINAGE ABSCESS right hip;  Surgeon: Omari" Magda BECKMAN MD;  Location:  [facility] OR;  Service: General;  Laterality: Right;    INCISION AND DRAINAGE ABSCESS Right 6/10/2022    Procedure: RIGHT HIP INCISION AND DRAINAGE. MD NEEDS 3L VANC IRRIGATION, CURRLORIN, DAICANS, KERLEX ROLLS;  Surgeon: Amadeo Turner MD;  Location:  [facility] OR;  Service: Plastics;  Laterality: Right;    INCISION AND DRAINAGE HIP Right 2/9/2021    Procedure: HIP INCISION AND DRAINAGE;  Surgeon: Nino Carlson MD;  Location:  [facility] OR;  Service: Orthopedics;  Laterality: Right;    INCISION AND DRAINAGE LEG Right 10/24/2021    Procedure: INCISION AND DRAINAGE LOWER EXTREMITY;  Surgeon: Amadeo Turner MD;  Location:  [facility] OR;  Service: Plastics;  Laterality: Right;    INCISION AND DRAINAGE OF WOUND Right 7/8/2021    Procedure: INCISION AND DRAINAGE WOUND RIGHT HIP;  Surgeon: James Huntley MD;  Location:  [facility] OR;  Service: Orthopedics;  Laterality: Right;    JOINT REPLACEMENT      KYPHOPLASTY WITH BIOPSY Bilateral 10/26/2021    Procedure: THOARCIC 12 KYPHOPLASTY WITH BIOPSY;  Surgeon: Bandar Shea MD;  Location:  [facility] OR;  Service: Neurosurgery;  Laterality: Bilateral;    LEG DEBRIDEMENT Right 9/14/2021    Procedure: DEBRIDEMENT OF RIGHT HIP WOUND, RIGHT GLUTEAL FASCIOCUTANEOUS ADVANCEMENT FLAP AND RIGHT TENSOR FASCIAL JESSICA FLAP;  Surgeon: Amadeo Turner MD;  Location:  [facility] OR;  Service: Plastics;  Laterality: Right;    LUMBAR DISCECTOMY Right 3/23/2021    Procedure: LUMBAR DISCECTOMY MICRO, Lumbar 1/2 right;  Surgeon: Bandar Shea MD;  Location:  [facility] OR;  Service: Neurosurgery;  Laterality: Right;    LUMBAR FUSION N/A 1/19/2018    Procedure: L3-4,L4-5 DECOMPRESSION, POSTERIOR SPINAL FUSION WITH INSTRUMENTATION;  Surgeon: Fortino Oropeza MD;  Location:  [facility] OR;  Service:     LUMBAR LAMINECTOMY WITH FUSION Left 1/17/2018    Procedure: LEFT L3-4 L4-5 LATERAL LUMBAR INTERBODY FUSION;  Surgeon: Fortino Oropeza MD;  Location:  [facility] OR;  Service:      MASS EXCISION Right 4/23/2024    Procedure: RIGHT BUTTOCK MASS EXCISION;  Surgeon: Moises Keyes MD;  Location:  [facility] OR;  Service: General;  Laterality: Right;    MYRINGOTOMY W/ TUBES  09/04/2014    TUBES NO LONGER IN PLACE    OTHER SURGICAL HISTORY      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 so hardware was removed and spacers were placed    REPLACEMENT TOTAL KNEE Right     URETEROSCOPY LASER LITHOTRIPSY WITH STENT INSERTION N/A 6/21/2024    Procedure: URETEROSCOPY LASER LITHOTRIPSY WITH STENT INSERTION LEFT;  Surgeon: Ky Plascencia MD;  Location:  [facility] OR;  Service: Urology;  Laterality: N/A;         OT ASSESSMENT FLOWSHEET (Last 12 Hours)       OT Evaluation and Treatment       Row Name 07/04/24 1135                   OT Time and Intention    Subjective Information complains of;pain  -EC        Document Type re-evaluation  -EC        Mode of Treatment occupational therapy  -EC           General Information    Patient Profile Reviewed yes  -EC        Pertinent History of Current Functional Problem complex medical history, s/p laparoscopic cholestecotmy  on 7/3/24  -EC        Existing Precautions/Restrictions fall  significant BLE weakness, R>L  -EC        Barriers to Rehab medically complex;previous functional deficit;physical barrier  -EC           Living Environment    Current Living Arrangements --  -EC           Pain Assessment    Additional Documentation Pain Scale: FACES Pre/Post-Treatment (Group)  -EC           Pain Scale: FACES Pre/Post-Treatment    Pain: FACES Scale, Pretreatment 2-->hurts little bit  -EC        Posttreatment Pain Rating 2-->hurts little bit  -EC        Pre/Posttreatment Pain Comment sore from surgery  -EC           Cognition    Orientation Status (Cognition) oriented x 4  -EC           Activities of Daily Living    BADL Assessment/Intervention lower body dressing  -EC           Lower Body Dressing Assessment/Training    [place] Level (Lower Body Dressing) don;socks;dependent (less  than 25% patient effort)  -EC        Position (Lower Body Dressing) supine  -EC           BADL Safety/Performance    Impairments, BADL Safety/Performance balance;endurance/activity tolerance;strength;trunk/postural control  -EC           Bed Mobility    Bed Mobility supine-sit;sit-supine;scooting/bridging;rolling right;rolling left  -EC        Rolling Left [place] (Bed Mobility) contact guard  -EC        Rolling Right [place] (Bed Mobility) contact guard  -EC        Scooting/Bridging [place] (Bed Mobility) dependent (less than 25% patient effort);2 person assist  -EC        Supine-Sit [place] (Bed Mobility) minimum assist (75% patient effort)  -EC        Sit-Supine [place] (Bed Mobility) dependent (less than 25% patient effort);2 person assist  -EC        Bed Mobility, Safety Issues decreased use of legs for bridging/pushing  -EC           Transfer Assessment/Treatment    Transfers sit-stand transfer;bed-chair transfer  -EC           Bed-Chair Transfer    Bed-Chair [place] (Transfers) verbal cues;maximum assist (25% patient effort);2 person assist  -EC        Comment, (Bed-Chair Transfer) attempted bed>chair however pt BLEs buckled longterm & required maxA x2 to pivot back to bed   -EC           Sit-Stand Transfer    Sit-Stand [place] (Transfers) minimum assist (75% patient effort)  -EC        Assistive Device (Sit-Stand Transfers) walker, front-wheeled  -EC           Safety Issues, Functional Mobility    Safety Issues Affecting Function (Mobility) friction/shear risk;positioning of assistive device;safety precaution awareness;safety precautions follow-through/compliance  -EC        Impairments Affecting Function (Mobility) balance;pain;strength;postural/trunk control;endurance/activity tolerance  -EC           Balance    Balance Assessment sitting static balance;sitting dynamic balance;standing static balance;standing dynamic balance  -EC        Static Sitting Balance standby  assist  -EC        Dynamic Sitting Balance contact guard  -EC        Position, Sitting Balance supported;sitting edge of bed  -EC        Static Standing Balance moderate assist;maximum assist;2-person assist  -EC        Dynamic Standing Balance moderate assist;maximum assist;2-person assist  -EC        Position/Device Used, Standing Balance supported;walker, front-wheeled  -EC        Comment, Balance BLE buckled during transfer   -EC           Wound 04/23/24 0936 Right gluteal Incision    Wound - Properties Group Placement Date: 04/23/24  -EP Placement Time: 0936  -EP Present on Original Admission: N  -EP Side: Right  -EP Location: gluteal  -EP Primary Wound Type: Incision  -EP    Retired Wound - Properties Group Placement Date: 04/23/24  -EP Placement Time: 0936  -EP Present on Original Admission: N  -EP Side: Right  -EP Location: gluteal  -EP Primary Wound Type: Incision  -EP    Retired Wound - Properties Group Date first assessed: 04/23/24  -EP Time first assessed: 0936  -EP Present on Original Admission: N  -EP Side: Right  -EP Location: gluteal  -EP Primary Wound Type: Incision  -EP       Wound 07/03/24 0947 abdomen Incision    Wound - Properties Group Placement Date: 07/03/24  -HW Placement Time: 0947 -HW Location: abdomen  -HW Primary Wound Type: Incision  -HW    Retired Wound - Properties Group Placement Date: 07/03/24  -HW Placement Time: 0947 -HW Location: abdomen  -HW Primary Wound Type: Incision  -HW    Retired Wound - Properties Group Date first assessed: 07/03/24  -HW Time first assessed: 0947 -HW Location: abdomen  -HW Primary Wound Type: Incision  -HW       Plan of Care Review    Plan of Care Reviewed With patient  -EC        Progress no change  -EC        Outcome Evaluation OT re-eval completed.  Pt A&Ox4 c/o generalized soreness s/p surgery on 7/3. Pt is agreeable to therapy & attempting chair transfer today. She required Jose R for bed mobility and was able to stand on first attempt requiring  only Jose R. Attempted bed>chair however mid transfer, her BLEs buckled and she required maxA x2 to stay upright and safely return to the bed. She is able to roll L&R with SBA only utilizing the bed rails. She would benefit from cont OT to progress her strength & act cara for increased safety & I with ADLs. Recommend SNF given her high fall risk.  -EC           Positioning and Restraints    Pre-Treatment Position in bed  -EC        Post Treatment Position bed  -EC        In Bed notified nsg;fowlers;call light within reach;encouraged to call for assist;side rails up x3;with family/caregiver  -EC           Therapy Assessment/Plan (OT)    OT Diagnosis decreased ADLs  -EC        Rehab Potential (OT) good, to achieve stated therapy goals  -EC        Criteria for Skilled Therapeutic Interventions Met (OT) yes;skilled treatment is necessary  -EC        Therapy Frequency (OT) 5 times/wk  -EC        Predicted Duration of Therapy Intervention (OT) 10 days  -EC        Planned Therapy Interventions (OT) activity tolerance training;adaptive equipment training;BADL retraining;functional balance retraining;occupation/activity based interventions;patient/caregiver education/training;ROM/therapeutic exercise;strengthening exercise;transfer/mobility retraining  -EC           Transfer Goal 1 (OT)    Activity/Assistive Device (Transfer Goal 1, OT) commode, bedside without drop arms;sit-to-stand/stand-to-sit;bed-to-chair/chair-to-bed;wheelchair transfer  -EC        [place] Level/Cues Needed (Transfer Goal 1, OT) maximum assist (25-49% patient effort)  -EC        Time Frame (Transfer Goal 1, OT) long term goal (LTG);10 days  -EC        Progress/Outcome (Transfer Goal 1, OT) goal revised this date  -EC           Grooming Goal 1 (OT)    Activity/Device (Grooming Goal 1, OT) grooming skills, all  -EC        [place] (Grooming Goal 1, OT) standby assist  -EC        Time Frame (Grooming Goal 1, OT) long term goal (LTG)  -EC         Strategies/Barriers (Grooming Goal 1, OT) sitting EOB  -EC        Progress/Outcome (Grooming Goal 1, OT) goal ongoing  -EC           Strength Goal 1 (OT)    Strength Goal 1 (OT) Pt will be I with KRISTEN HEP strengthening to increase her I with ADLs  -EC        Time Frame (Strength Goal 1, OT) long term goal (LTG);10 days  -EC        Progress/Outcome (Strength Goal 1, OT) goal ongoing  -EC                  User Key  (r) = Recorded By, (t) = Taken By, (c) = Cosigned By      Initials Name Effective Dates    HW Benjie Motta, SARAH 02/08/24 -     EP Sonny Rivero RN 01/22/24 -     EC Ashley Martino OTR/L 10/13/23 -                      Occupational Therapy Education       Title: PT OT SLP Therapies (Done)       Topic: Occupational Therapy (Done)       Point: ADL training (Done)       Description:   Instruct learner(s) on proper safety adaptation and remediation techniques during self care or transfers.   Instruct in proper use of assistive devices.                  Learning Progress Summary             Patient Acceptance, E, VU by EC at 7/4/2024 1540    Acceptance, E, VU,NR by LS at 6/28/2024 1110    Acceptance, E, VU by KS at 6/20/2024 1715    Acceptance, E, VU by EC at 6/20/2024 1558    Acceptance, E, VU by EC at 6/17/2024 1525   Caregiver Acceptance, E, VU by EC at 6/20/2024 1558                         Point: Home exercise program (Done)       Description:   Instruct learner(s) on appropriate technique for monitoring, assisting and/or progressing therapeutic exercises/activities.                  Learning Progress Summary             Patient Acceptance, E, VU by EC at 7/4/2024 1540    Acceptance, E, VU by KS at 6/20/2024 1715    Acceptance, E, VU by EC at 6/20/2024 1558   Caregiver Acceptance, E, VU by EC at 6/20/2024 1558                         Point: Precautions (Done)       Description:   Instruct learner(s) on prescribed precautions during self-care and functional transfers.                  Learning Progress  Summary             Patient Acceptance, E, VU by EC at 7/4/2024 1540    Acceptance, E, VU,NR by LS at 6/28/2024 1110    Acceptance, E, VU by KS at 6/20/2024 1715    Acceptance, E, VU,NR by LS at 6/19/2024 1136    Acceptance, E, VU by EC at 6/17/2024 1525                         Point: Body mechanics (Done)       Description:   Instruct learner(s) on proper positioning and spine alignment during self-care, functional mobility activities and/or exercises.                  Learning Progress Summary             Patient Acceptance, E, VU by EC at 7/4/2024 1540    Acceptance, E, VU,NR by LS at 6/28/2024 1110    Acceptance, E, VU by KS at 6/20/2024 1715    Acceptance, E, VU by EC at 6/20/2024 1558    Acceptance, E, VU,NR by LS at 6/19/2024 1136    Acceptance, E, VU by EC at 6/17/2024 1525   Caregiver Acceptance, E, VU by EC at 6/20/2024 1558                                         User Key       Initials Effective Dates Name Provider Type Discipline    [place] 02/27/23 -  Ewelina Morales RN Registered Nurse Nurse     06/20/22 -  Bianca Mcgarry, OTR/L Occupational Therapist OT     10/13/23 -  Ashley Martino OTR/L Occupational Therapist OT                      OT Recommendation and Plan  Planned Therapy Interventions (OT): activity tolerance training, adaptive equipment training, BADL retraining, functional balance retraining, occupation/activity based interventions, patient/caregiver education/training, ROM/therapeutic exercise, strengthening exercise, transfer/mobility retraining  Therapy Frequency (OT): 5 times/wk  Plan of Care Review  Plan of Care Reviewed With: patient  Progress: no change  Outcome Evaluation: OT re-eval completed.  Pt A&Ox4 c/o generalized soreness s/p surgery on 7/3. Pt is agreeable to therapy & attempting chair transfer today. She required Jose R for bed mobility and was able to stand on first attempt requiring only Jose R. Attempted bed>chair however mid transfer, her BLEs buckled and she required  maxA x2 to stay upright and safely return to the bed. She is able to roll L&R with SBA only utilizing the bed rails. She would benefit from cont OT to progress her strength & act cara for increased safety & I with ADLs. Recommend SNF given her high fall risk.  Plan of Care Reviewed With: patient  Outcome Evaluation: OT re-eval completed.  Pt A&Ox4 c/o generalized soreness s/p surgery on 7/3. Pt is agreeable to therapy & attempting chair transfer today. She required Jose R for bed mobility and was able to stand on first attempt requiring only Jose R. Attempted bed>chair however mid transfer, her BLEs buckled and she required maxA x2 to stay upright and safely return to the bed. She is able to roll L&R with SBA only utilizing the bed rails. She would benefit from cont OT to progress her strength & act cara for increased safety & I with ADLs. Recommend SNF given her high fall risk.     Outcome Measures       Row Name 07/04/24 1200 07/02/24 1500 07/02/24 1100       How much help from another person do you currently need...    Turning from your back to your side while in flat bed without using bedrails? -- -- 3  -AH    Moving from lying on back to sitting on the side of a flat bed without bedrails? -- -- 3  -AH    Moving to and from a bed to a chair (including a wheelchair)? -- -- 2  -AH    Standing up from a chair using your arms (e.g., wheelchair, bedside chair)? -- -- 2  -AH    Climbing 3-5 steps with a railing? -- -- 1  -AH    To walk in hospital room? -- -- 1  -AH    AM-PAC 6 Clicks Score (PT) -- -- 12  -AH    Highest Level of Mobility Goal -- -- 4 --> Transfer to chair/commode  -AH       How much help from another is currently needed...    Putting on and taking off regular lower body clothing? 1  -EC 2  -TS --    Bathing (including washing, rinsing, and drying) 2  -EC 2  -TS --    Toileting (which includes using toilet bed pan or urinal) 2  -EC 2  -TS --    Putting on and taking off regular upper body clothing 3  -EC 3   -TS --    Taking care of personal grooming (such as brushing teeth) 3  -EC 3  -TS --    Eating meals 4  -EC 4  -TS --    AM-PAC 6 Clicks Score (OT) 15  -EC 16  -TS --       Functional Assessment    Outcome Measure Options AM-PAC 6 Clicks Daily Activity (OT)  -EC -- AM-PAC 6 Clicks Basic Mobility (PT)  -              User Key  (r) = Recorded By, (t) = Taken By, (c) = Cosigned By      Initials Name Provider Type     Olga Chambers, SERENA Physical Therapist Assistant     Carolee Giordano COTA Occupational Therapist Assistant    EC Ashley Martino, OTR/L Occupational Therapist                    Time Calculation:    Time Calculation- OT       Row Name 07/04/24 1204             Time Calculation- OT    OT Start Time 1135  -EC      OT Stop Time 1200  -EC      OT Time Calculation (min) 25 min  -EC      OT Received On 07/04/24  -EC      OT Goal Re-Cert Due Date 07/14/24  -EC         Untimed Charges    OT Eval/Re-eval Minutes 25  -EC         Total Minutes    Untimed Charges Total Minutes 25  -EC       Total Minutes 25  -EC                User Key  (r) = Recorded By, (t) = Taken By, (c) = Cosigned By      Initials Name Provider Type    EC Ashley Martino, OTR/L Occupational Therapist                  Therapy Charges for Today       Code Description Service Date Service Provider Modifiers Qty    [id number] HC OT RE-EVAL 2 7/4/2024 Ashley Martino, OTR/L GO 1                 Ashley Martino OTR/L  7/4/2024

## 2024-07-04 NOTE — PLAN OF CARE
Goal Outcome Evaluation:  Plan of Care Reviewed With: patient, caregiver      Pt A&Ox4, VSS, caregiver at bedside, OT x2 attempted to get pt in chair today and pt fatigued and couldn't transfer to chair, drsg's to buttock intact, pt didn't want at one point today for it to be changed, abd drsg's x4 CDI, turned, meds in applesauce, good appetite, rested well, safety maintained, plan of care ongoing.

## 2024-07-04 NOTE — PROGRESS NOTES
"GENERAL SURGERY INPATIENT PROGRESS NOTE    Patient Name:  Tawny Shin  YOB: 1955  MRN: [id number]    SUBJECTIVE    No unexpected events overnight.  The patient reports that she is overall doing well but still having some pain at the right trocar site.  Her nausea has resolved.  She is tolerating a regular diet.    Allergies:  Allergies   Allergen Reactions    Atorvastatin Other (See Comments)     LEG CRAMPS      Amoxicillin Rash    Escitalopram Rash    Nabumetone Rash    Niacin Er Rash    Penicillin G Rash    Penicillins Rash    Simvastatin Rash       Medications:  allopurinol, 100 mg, Oral, Daily  ascorbic acid, 500 mg, Oral, Daily  aspirin, 81 mg, Oral, Daily  carvedilol, 25 mg, Oral, BID With Meals  colchicine, 0.6 mg, Oral, Daily  DULoxetine, 60 mg, Oral, Daily  [Held by provider] enoxaparin, 1 mg/kg, Subcutaneous, Q12H  estradiol, 2 g, Vaginal, Once per day on Monday Thursday  ferrous sulfate, 325 mg, Oral, Daily With Breakfast  folic acid, 1,000 mcg, Oral, Daily  [Held by provider] furosemide, 40 mg, Oral, Daily  indocyanine green, 1.25 mg, Intravenous, Once  isosorbide mononitrate, 60 mg, Oral, Daily  leflunomide, 20 mg, Oral, Daily  levothyroxine, 75 mcg, Oral, Q AM  losartan, 50 mg, Oral, Daily  methenamine, 1 g, Oral, BID  multivitamin with minerals, 1 tablet, Oral, Daily  pantoprazole, 40 mg, Oral, Daily  predniSONE, 20 mg, Oral, Daily With Breakfast  sodium chloride, 10 mL, Intravenous, Q12H  spironolactone, 25 mg, Oral, Daily    lactated ringers, 100 mL/hr, Last Rate: 100 mL/hr (07/03/24 0923)        OBJECTIVE    VITAL SIGNS  /49 (BP Location: Right arm, Patient Position: Lying)   Pulse 72   Temp 97.9 °F (36.6 °C) (Oral)   Resp 16   Ht 167.6 cm (66\")   Wt 94.8 kg (209 lb)   LMP  (LMP Unknown)   SpO2 93%   BMI 33.73 kg/m²     Intake/Output Summary (Last 24 hours) at 7/4/2024 1258  Last data filed at 7/4/2024 0830  Gross per 24 hour   Intake 1240 ml   Output --   Net " 1240 ml       PHYSICAL EXAM    General: Elderly female, lying in bed, in no acute distress.  HEENT:  NCAT, PERRL, EOM intact bilaterally, hearing intact, nares patent  Heart:  Regular rate, normotensive, no JVD bilaterally  Lungs:  Normal respiratory effort, regular respiratory rate, no wheezing  Abdomen: protuberant  Extremities:  No cyanosis, clubbing or edema.   Musculoskeletal:  Normal development of bilateral upper extremities. Normal development of bilateral lower extremities.  Range of motion intact.  No evidence of trauma.  Skin:  No rashes, ecchymosis or jaundice. Dry and non-diaphoretic. Skin color is consistent with ethnicity.    RESULTS    Labs:  I personally reviewed all pertinent labs.   Imaging:  I personally reviewed all pertinent imaging studies.   Pathology:        ASSESSMENT AND PLAN    Active Hospital Problems    Diagnosis     **Altered mental status     Acute gout     Esophageal dysphagia     UTI (urinary tract infection)     Hydronephrosis with urinary obstruction due to ureteral calculus     Acute cholecystitis     Hypothyroidism     Stage 3b chronic kidney disease     Near functional paraplegia     Cholelithiasis     Essential hypertension     Sick sinus syndrome     Coronary artery disease involving native coronary artery of native heart without angina pectoris     Venous insufficiency     Rheumatoid arthritis involving multiple sites     Chronic heart failure with preserved ejection fraction     Class 1 obesity due to excess calories with serious comorbidity and body mass index (BMI) of 33.0 to 33.9 in adult     Functional neurological symptom disorder with weakness or paralysis     Chronic anticoagulation     Chronic embolism and thrombosis of unspecified deep veins of left lower extremity          DAILY CARE PLAN    Still having some incisional pain but the nausea she was experiencing preoperatively has resolved.  Hemoglobin has decreased a little bit from yesterday, but she is  chronically anemic.  Safe to resume oral anticoagulation 24 to 48 hours postoperatively.    I discussed the patient's findings and my recommendations with the patient/family, as well as the primary care team.    Moises Keyes MD, [facility]  General Surgery  7/4/2024  12:58 CDT    Please note that portions of this note were completed with a voice recognition program.

## 2024-07-04 NOTE — PLAN OF CARE
Problem: Adult Inpatient Plan of Care  Goal: Plan of Care Review  Outcome: Ongoing, Progressing  Goal: Patient-Specific Goal (Individualized)  Outcome: Ongoing, Progressing  Goal: Absence of Hospital-Acquired Illness or Injury  Outcome: Ongoing, Progressing  Intervention: Identify and Manage Fall Risk  Recent Flowsheet Documentation  Taken 7/4/2024 0400 by Christiano Simms RN  Safety Promotion/Fall Prevention: safety round/check completed  Taken 7/4/2024 0200 by Christiano Simms RN  Safety Promotion/Fall Prevention: safety round/check completed  Taken 7/4/2024 0000 by Christiano Simms RN  Safety Promotion/Fall Prevention: safety round/check completed  Taken 7/3/2024 2200 by Christiano Simms RN  Safety Promotion/Fall Prevention: safety round/check completed  Taken 7/3/2024 2100 by Christiano Simms RN  Safety Promotion/Fall Prevention: safety round/check completed  Taken 7/3/2024 2000 by Christiano Simms RN  Safety Promotion/Fall Prevention: safety round/check completed  Taken 7/3/2024 1900 by Christiano Simms RN  Safety Promotion/Fall Prevention: safety round/check completed  Goal: Optimal Comfort and Wellbeing  Outcome: Ongoing, Progressing  Goal: Readiness for Transition of Care  Outcome: Ongoing, Progressing     Problem: Fall Injury Risk  Goal: Absence of Fall and Fall-Related Injury  Outcome: Ongoing, Progressing  Intervention: Promote Injury-Free Environment  Recent Flowsheet Documentation  Taken 7/4/2024 0400 by Christiano Simms RN  Safety Promotion/Fall Prevention: safety round/check completed  Taken 7/4/2024 0200 by Christiano Simms RN  Safety Promotion/Fall Prevention: safety round/check completed  Taken 7/4/2024 0000 by Christiano Simms RN  Safety Promotion/Fall Prevention: safety round/check completed  Taken 7/3/2024 2200 by Christiano Simms RN  Safety Promotion/Fall Prevention: safety round/check completed  Taken 7/3/2024 2100 by Christiano Simms RN  Safety Promotion/Fall Prevention: safety round/check completed  Taken 7/3/2024 2000 by Christiano Simms RN  Safety  Promotion/Fall Prevention: safety round/check completed  Taken 7/3/2024 1900 by Christiano Simms RN  Safety Promotion/Fall Prevention: safety round/check completed     Problem: Skin Injury Risk Increased  Goal: Skin Health and Integrity  Outcome: Ongoing, Progressing     Problem: COPD (Chronic Obstructive Pulmonary Disease) Comorbidity  Goal: Maintenance of COPD Symptom Control  Outcome: Ongoing, Progressing     Problem: Diabetes Comorbidity  Goal: Blood Glucose Level Within Targeted Range  Outcome: Ongoing, Progressing     Problem: Heart Failure Comorbidity  Goal: Maintenance of Heart Failure Symptom Control  Outcome: Ongoing, Progressing     Problem: Oral Intake Inadequate  Goal: Improved Oral Intake  Outcome: Ongoing, Progressing   Goal Outcome Evaluation:            Patient is a/o x 4. Patient has not had any complaints of pain overnight. Patient has slept most of the night. Patient has not had any adverse events overnight.

## 2024-07-04 NOTE — PROGRESS NOTES
"INFECTIOUS DISEASES PROGRESS NOTE    Patient:  Tawny Shin  YOB: 1955  MRN: [id number]   Admit date: 6/12/2024   Admitting Physician: Saulo Ambriz MD  Primary Care Physician: Moises Oliveira MD    Chief Complaint: \"Not nauseated at all\"      Interval History: Patient and caregiver notes she is not nauseated.  She did not have any gagging when she took her pills.    Allergies:   Allergies   Allergen Reactions    Atorvastatin Other (See Comments)     LEG CRAMPS      Amoxicillin Rash    Escitalopram Rash    Nabumetone Rash    Niacin Er Rash    Penicillin G Rash    Penicillins Rash    Simvastatin Rash       Current Scheduled Medications:   allopurinol, 100 mg, Oral, Daily  ascorbic acid, 500 mg, Oral, Daily  aspirin, 81 mg, Oral, Daily  carvedilol, 25 mg, Oral, BID With Meals  colchicine, 0.6 mg, Oral, Daily  DULoxetine, 60 mg, Oral, Daily  [Held by provider] enoxaparin, 1 mg/kg, Subcutaneous, Q12H  estradiol, 2 g, Vaginal, Once per day on Monday Thursday  ferrous sulfate, 325 mg, Oral, Daily With Breakfast  folic acid, 1,000 mcg, Oral, Daily  [Held by provider] furosemide, 40 mg, Oral, Daily  indocyanine green, 1.25 mg, Intravenous, Once  isosorbide mononitrate, 60 mg, Oral, Daily  leflunomide, 20 mg, Oral, Daily  levothyroxine, 75 mcg, Oral, Q AM  losartan, 50 mg, Oral, Daily  methenamine, 1 g, Oral, BID  multivitamin with minerals, 1 tablet, Oral, Daily  pantoprazole, 40 mg, Oral, Daily  predniSONE, 20 mg, Oral, Daily With Breakfast  sodium chloride, 10 mL, Intravenous, Q12H  spironolactone, 25 mg, Oral, Daily      Current PRN Medications:    acetaminophen    senna-docusate sodium **AND** polyethylene glycol **AND** bisacodyl **AND** bisacodyl    Calcium Replacement - Follow Nurse / BPA Driven Protocol    Diclofenac Sodium    guaifenesin    Lidocaine    Magnesium Low Dose Replacement - Follow Nurse / BPA Driven Protocol    [DISCONTINUED] Morphine **AND** naloxone    nitroglycerin    " "ondansetron    oxyCODONE    Phosphorus Replacement - Follow Nurse / BPA Driven Protocol    Potassium Replacement - Follow Nurse / BPA Driven Protocol    sodium chloride    sodium chloride    sodium chloride    traMADol    lactated ringers, 100 mL/hr, Last Rate: 100 mL/hr (24 09)             Objective     Vital Signs:  Temp (24hrs), Av.9 °F (36.6 °C), Min:97.5 °F (36.4 °C), Max:98.6 °F (37 °C)      /60 (BP Location: Right arm, Patient Position: Lying)   Pulse 65   Temp 98.6 °F (37 °C) (Oral)   Resp 16   Ht 167.6 cm (66\")   Wt 94.8 kg (209 lb)   LMP  (LMP Unknown)   SpO2 93%   BMI 33.73 kg/m²         Physical Exam:    General: Patient is bright appearing sitting up in bed in no acute distress  Abdomen: Surgical incision sites with dressings intact.  Abdomen soft  Neuro: Alert and oriented, speech clear  Psych: Pleasant cooperative    Results Review:    I reviewed the patient's new clinical results.    Lab Results:    CBC:   Lab Results   Lab 24  0603   WBC 4.53 4.29 5.42 4.81   HEMOGLOBIN 8.1* 8.0* 8.5* 7.5*   HEMATOCRIT 27.2* 26.9* 29.1* 25.3*   PLATELETS 151 142 194 206        AutoDiff:            Manual Diff:          Invalid input(s): \"MONABS\"          CMP:   Lab Results   Lab 2452 24  0603   SODIUM 138 139 136 136   POTASSIUM 3.8 3.8 4.3 5.0   CHLORIDE 103 105 102 103   CO2 27.0 26.0 23.0 24.0   BUN 13 12 15 19   CREATININE 1.10* 0.87 0.91 0.89   CALCIUM 8.7 8.6 9.0 8.9   BILIRUBIN 0.2  --   --  0.2   ALK PHOS 86  --   --  82   ALT (SGPT) 21  --   --  26   AST (SGOT) 25  --   --  43*   GLUCOSE 92 92 123* 122*       Estimated Creatinine Clearance: 68.2 mL/min (by C-G formula based on SCr of 0.89 mg/dL).    Culture Results:    Microbiology Results (last 10 days)       ** No results found for the last 240 hours. **             C-Reactive Protein   Date Value Ref Range Status   2024 4.04 " (H) 0.00 - 0.50 mg/dL Final   07/18/2022 3.34 (H) 0.00 - 0.50 mg/dL Final   07/15/2022 2.92 (H) 0.00 - 0.50 mg/dL Final     Sed Rate   Date Value Ref Range Status   07/05/2022 75 (H) 0 - 30 mm/hr Final   06/30/2022 72 (H) 0 - 30 mm/hr Final   06/27/2022 38 (H) 0 - 30 mm/hr Final             Radiology:         Imaging Results (Last 72 Hours)       Procedure Component Value Units Date/Time    FL Cholangiogram Operative [[id number]] Collected: 07/03/24 1038     Updated: 07/03/24 1043    Narrative:      EXAMINATION: FL CHOLANGIOGRAM OPERATIVE-     7/3/2024 9:00 AM     HISTORY: stones; N39.0-Urinary tract infection, site not specified;  R41.82-Altered mental status, unspecified; S31.819S-Unspecified open  wound of right buttock, sequela; L72.9-Follicular cyst of the skin and  subcutaneous tissue, unspecified; R13.10-Dysphagia, unspecified;  Z74.09-Other reduced mobility; N13.2-Hydronephrosis with renal and  ureteral calculous obstruction; K81.0-Acute cholecystitis; I50.9-Hea     Number of fluoroscopic spot images obtained = 1.     Fluoroscopy dose in Air Kerma mGy = 4.6865.     Fluoroscopy total dose area product (Gycm2) = 2.0600.     Fluoroscopy total exposure time = 19.0 seconds     Spot images obtained at the time of laparoscopic cholecystectomy show  cannulation of the cystic duct with contrast injection for opacification  of the biliary tree.     No filling defect is seen.     The intrahepatic bile ducts have a normal appearance.     There is no bile duct obstruction as contrast passes into the duodenum.     Summary :  1. No abnormality is seen.                    This report was signed and finalized on 7/3/2024 10:40 AM by Dr. Jesus Manuel Santos MD.       FL C Arm During Surgery [[id number]] Resulted: 07/03/24 1038     Updated: 07/03/24 1038    Narrative:      This procedure was auto-finalized with no dictation required.                Active Hospital Problems    Diagnosis     **Altered mental status     Acute gout      Esophageal dysphagia     UTI (urinary tract infection)     Hydronephrosis with urinary obstruction due to ureteral calculus     Acute cholecystitis     Hypothyroidism     Stage 3b chronic kidney disease     Near functional paraplegia     Cholelithiasis     Essential hypertension     Sick sinus syndrome     Coronary artery disease involving native coronary artery of native heart without angina pectoris     Venous insufficiency     Rheumatoid arthritis involving multiple sites     Chronic heart failure with preserved ejection fraction     Class 1 obesity due to excess calories with serious comorbidity and body mass index (BMI) of 33.0 to 33.9 in adult     Functional neurological symptom disorder with weakness or paralysis     Chronic anticoagulation     Chronic embolism and thrombosis of unspecified deep veins of left lower extremity        IMPRESSION:  Recurrent urinary tract infections in patient admitted with Carbapenem resistant Enterobacter cloacae isolated from urine she completed a course of cefepime June 25..  Cholelithiasis.  Patient is status post cholecystectomy and intraoperative cholangiogram July 3.  Findings of inflamed and distended gallbladder noted.     Obstructive left ureteral stone status post cystoscopy and left ureteral stent placement on June 21 Dr. Plascencia.  Indwelling catheter and stent removed 6/29  Right gluteal wound  Overall deconditioning-PT OT following        RECOMMENDATION:     Gout treatment per hospitalist-on allopurinol.  Wean steroid?   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  Continue local wound care to right gluteal wound  Continue to encourage working with PT/OT      Maggie Bang MD  07/04/24  10:43 CDT

## 2024-07-05 LAB
CYTO UR: NORMAL
DEPRECATED RDW RBC AUTO: 65.8 FL (ref 37–54)
ERYTHROCYTE [DISTWIDTH] IN BLOOD BY AUTOMATED COUNT: 20.3 % (ref 12.3–15.4)
HCT VFR BLD AUTO: 26.7 % (ref 34–46.6)
HGB BLD-MCNC: 7.9 G/DL (ref 12–15.9)
LAB AP CASE REPORT: NORMAL
Lab: NORMAL
MCH RBC QN AUTO: 27.8 PG (ref 26.6–33)
MCHC RBC AUTO-ENTMCNC: 29.6 G/DL (ref 31.5–35.7)
MCV RBC AUTO: 94 FL (ref 79–97)
PATH REPORT.FINAL DX SPEC: NORMAL
PATH REPORT.GROSS SPEC: NORMAL
PLATELET # BLD AUTO: 228 10*3/MM3 (ref 140–450)
PMV BLD AUTO: 11.2 FL (ref 6–12)
RBC # BLD AUTO: 2.84 10*6/MM3 (ref 3.77–5.28)
WBC NRBC COR # BLD AUTO: 5.98 10*3/MM3 (ref 3.4–10.8)

## 2024-07-05 PROCEDURE — 97535 SELF CARE MNGMENT TRAINING: CPT

## 2024-07-05 PROCEDURE — 97110 THERAPEUTIC EXERCISES: CPT

## 2024-07-05 PROCEDURE — 99231 SBSQ HOSP IP/OBS SF/LOW 25: CPT | Performed by: INTERNAL MEDICINE

## 2024-07-05 PROCEDURE — 63710000001 PREDNISONE PER 1 MG: Performed by: SURGERY

## 2024-07-05 PROCEDURE — 97530 THERAPEUTIC ACTIVITIES: CPT

## 2024-07-05 PROCEDURE — 85027 COMPLETE CBC AUTOMATED: CPT | Performed by: FAMILY MEDICINE

## 2024-07-05 RX ORDER — PREDNISONE 10 MG/1
10 TABLET ORAL
Status: DISCONTINUED | OUTPATIENT
Start: 2024-07-06 | End: 2024-07-06

## 2024-07-05 RX ORDER — PREDNISONE 5 MG/1
5 TABLET ORAL
Status: DISCONTINUED | OUTPATIENT
Start: 2024-07-06 | End: 2024-07-05

## 2024-07-05 RX ADMIN — TRAMADOL HYDROCHLORIDE 50 MG: 50 TABLET ORAL at 21:58

## 2024-07-05 RX ADMIN — FERROUS SULFATE TAB 325 MG (65 MG ELEMENTAL FE) 325 MG: 325 (65 FE) TAB at 08:55

## 2024-07-05 RX ADMIN — APIXABAN 5 MG: 5 TABLET, FILM COATED ORAL at 08:54

## 2024-07-05 RX ADMIN — ACETAMINOPHEN 650 MG: 325 TABLET, FILM COATED ORAL at 00:31

## 2024-07-05 RX ADMIN — Medication 1 TABLET: at 09:00

## 2024-07-05 RX ADMIN — METHENAMINE HIPPURATE 1 G: 1000 TABLET ORAL at 21:58

## 2024-07-05 RX ADMIN — TRAMADOL HYDROCHLORIDE 50 MG: 50 TABLET ORAL at 13:06

## 2024-07-05 RX ADMIN — ISOSORBIDE MONONITRATE 60 MG: 60 TABLET, EXTENDED RELEASE ORAL at 08:55

## 2024-07-05 RX ADMIN — PANTOPRAZOLE SODIUM 40 MG: 40 TABLET, DELAYED RELEASE ORAL at 08:54

## 2024-07-05 RX ADMIN — COLCHICINE 0.6 MG: 0.6 TABLET ORAL at 08:56

## 2024-07-05 RX ADMIN — LEFLUNOMIDE 20 MG: 20 TABLET ORAL at 08:57

## 2024-07-05 RX ADMIN — APIXABAN 5 MG: 5 TABLET, FILM COATED ORAL at 21:57

## 2024-07-05 RX ADMIN — SPIRONOLACTONE 25 MG: 25 TABLET ORAL at 08:56

## 2024-07-05 RX ADMIN — Medication 10 ML: at 21:58

## 2024-07-05 RX ADMIN — Medication 10 ML: at 08:57

## 2024-07-05 RX ADMIN — ASPIRIN 81 MG: 81 TABLET, COATED ORAL at 08:54

## 2024-07-05 RX ADMIN — DULOXETINE HYDROCHLORIDE 60 MG: 30 CAPSULE, DELAYED RELEASE ORAL at 08:55

## 2024-07-05 RX ADMIN — OXYCODONE HYDROCHLORIDE AND ACETAMINOPHEN 500 MG: 500 TABLET ORAL at 08:55

## 2024-07-05 RX ADMIN — PREDNISONE 20 MG: 20 TABLET ORAL at 08:55

## 2024-07-05 RX ADMIN — METHENAMINE HIPPURATE 1 G: 1000 TABLET ORAL at 08:57

## 2024-07-05 RX ADMIN — ACETAMINOPHEN 650 MG: 325 TABLET, FILM COATED ORAL at 10:50

## 2024-07-05 RX ADMIN — LOSARTAN POTASSIUM 50 MG: 50 TABLET, FILM COATED ORAL at 08:55

## 2024-07-05 RX ADMIN — LEVOTHYROXINE SODIUM 75 MCG: 0.07 TABLET ORAL at 06:07

## 2024-07-05 RX ADMIN — FOLIC ACID 1000 MCG: 1 TABLET ORAL at 08:55

## 2024-07-05 RX ADMIN — CARVEDILOL 25 MG: 25 TABLET, FILM COATED ORAL at 08:54

## 2024-07-05 RX ADMIN — ALLOPURINOL 100 MG: 100 TABLET ORAL at 08:55

## 2024-07-05 NOTE — PROGRESS NOTES
[facility]  INPATIENT PROGRESS NOTE    Patient Name: Tawny Shin  Date of Admission: 6/12/2024  Today's Date: 07/05/24  Length of Stay: 14  Primary Care Physician: Moises Oliveira MD    Subjective   Chief Complaint: Weakness .  Abdomen pain.   HPI   [facility] .  Blood pressure slightly high but stable.  Discussed with Dr. Mckeon yesterday, will put patient back on Eliquis today.  Hemoglobin increased .    Review of Systems   Constitutional:  Positive for activity change, appetite change and fatigue. Negative for chills and fever.   HENT:  Negative for hearing loss, nosebleeds, tinnitus and trouble swallowing.    Eyes:  Negative for visual disturbance.   Respiratory:  Negative for cough, chest tightness, shortness of breath and wheezing.    Cardiovascular:  Negative for chest pain, palpitations and leg swelling.   Gastrointestinal: Right upper abdomen pain from surgery.  Endocrine: Negative for cold intolerance, heat intolerance, polydipsia, polyphagia and polyuria.   Genitourinary:  Negative for decreased urine volume, difficulty urinating, dysuria, flank pain, frequency and hematuria.   Musculoskeletal:  Positive for arthralgias, gait problem and myalgias. Negative for joint swelling.   Skin:  Negative for rash.   Allergic/Immunologic: Negative for immunocompromised state.   Neurological:  Positive for weakness. Negative for dizziness, syncope, light-headedness and headaches.   Hematological:  Negative for adenopathy. Does not bruise/bleed easily.  All pertinent negatives and positives are as above. All other systems have been reviewed and are negative unless otherwise stated.     Objective    Temp:  [97.5 °F (36.4 °C)-98.2 °F (36.8 °C)] 97.9 °F (36.6 °C)  Heart Rate:  [68-98] 98  Resp:  [16] 16  BP: (131-164)/(49-69) 157/67  Physical Exam  Vitals and nursing note reviewed.   Constitutional:       Comments:  Chronically ill.  Deconditioning.   HENT:      Head: Normocephalic.   Eyes:      Conjunctiva/sclera: Conjunctivae normal.      Pupils: Pupils are equal, round, and reactive to light.   Cardiovascular:      Rate and Rhythm: Normal rate and regular rhythm.      Heart sounds: Normal heart sounds.   Pulmonary:      Effort: Pulmonary effort is normal. No respiratory distress.      Breath sounds: Normal breath sounds.   Abdominal:      General: Bowel sounds are normal. There is no distension.      Palpations: Abdomen is soft.  Right upper abdomen pain, from surgery.     Comments: Obesity.     Musculoskeletal:         General: No swelling.      Cervical back: Neck supple.   Skin:     General: Skin is warm and dry.      Capillary Refill: Capillary refill takes 2 to 3 seconds.      Findings: No rash.   Neurological:      Mental Status: She is alert and oriented to person, place, and time.      Motor: Weakness present.      Coordination: Coordination abnormal.      Gait: Gait abnormal.   Psychiatric:         Mood and Affect: Mood normal.         Behavior: Behavior normal.         Thought Content: Thought content normal.       Results Review:  I have reviewed the labs, radiology results, and diagnostic studies.    Laboratory Data:   Results from last 7 days   Lab Units 07/05/24  0600 07/04/24  0603 07/01/24  0424   WBC 10*3/mm3 5.98 4.81 5.42   HEMOGLOBIN g/dL 7.9* 7.5* 8.5*   HEMATOCRIT % 26.7* 25.3* 29.1*   PLATELETS 10*3/mm3 228 206 194        Results from last 7 days   Lab Units 07/04/24  0603 07/01/24  0424 06/30/24  0552 06/29/24  0447   SODIUM mmol/L 136 136 139 138   POTASSIUM mmol/L 5.0 4.3 3.8 3.8   CHLORIDE mmol/L 103 102 105 103   CO2 mmol/L 24.0 23.0 26.0 27.0   BUN mg/dL 19 15 12 13   CREATININE mg/dL 0.89 0.91 0.87 1.10*   CALCIUM mg/dL 8.9 9.0 8.6 8.7   BILIRUBIN mg/dL 0.2  --   --  0.2   ALK PHOS U/L 82  --   --  86   ALT (SGPT) U/L 26  --   --  21   AST (SGOT) U/L 43*  --   --  25   GLUCOSE mg/dL 122* 123* 92  "92       Culture Data:   No results found for: \"BLOODCX\", \"URINECX\", \"WOUNDCX\", \"MRSACX\", \"RESPCX\", \"STOOLCX\"    Radiology Data:   Imaging Results (Last 24 Hours)       ** No results found for the last 24 hours. **            I have reviewed the patient's current medications.     Assessment/Plan   Assessment  Active Hospital Problems    Diagnosis     **Altered mental status     Acute gout     Esophageal dysphagia     UTI (urinary tract infection)     Hydronephrosis with urinary obstruction due to ureteral calculus     Acute cholecystitis     Hypothyroidism     Stage 3b chronic kidney disease     Near functional paraplegia     Cholelithiasis     Essential hypertension     Sick sinus syndrome     Coronary artery disease involving native coronary artery of native heart without angina pectoris     Venous insufficiency     Rheumatoid arthritis involving multiple sites     Chronic heart failure with preserved ejection fraction     Class 1 obesity due to excess calories with serious comorbidity and body mass index (BMI) of 33.0 to 33.9 in adult     Functional neurological symptom disorder with weakness or paralysis     Chronic anticoagulation     Chronic embolism and thrombosis of unspecified deep veins of left lower extremity        Treatment Plan  Gallstone, within the gallbladder neck/dysfunctional gallbladder.  Mild distended gallbladder.  Right upper quadrant tenderness.  Ultrasound of the gallbladder-Cholelithiasis with small shadowing stones and sludge within the region of the gallbladder neck- Moderate distention of the gallbladder- however no gallbladder wall thickening or pericholecystic fluid identified- No biliary dilatation.  HIDA scan-Nonvisualization of the gallbladder on images obtained up to 90 minutes- Cystic duct obstruction and acute cholecystitis cannot be excluded on the basis of this exam,  No evidence of common bile duct obstruction with emptying of activity into the C-loop of the duodenum and " proximal jejunum, Gallstones and sludge were demonstrated on recent ultrasound- combined with nonvisualization of the gallbladder exclude performance of an ejection fraction.  Status post cholecystectomy.     Hypertension/sick sinus syndrome/venous insufficiency/CHF.  Start back on Eliquis today.  Aspirin . Coreg . Lasix.  Imdur.  Cozaar . Aldactone.  Nitro as needed.     Recurrent UTI/neurogenic bladder.  Patient finished cefepime/Flagyl antibiotics.  Continue HipRex.  Repeated UA, negative for bacteria.  Consult ID.  Urology consulted.  CT scan abdomen pelvic . Obstructive uropathy on the left with mild to moderate dilatation of the upper tracts of the left kidney and left ureter into the true pelvis where there is a 5 x 6 mm calculus within the left ureter approximately 4 to 5 cm above the UVJ- right ureter is decompressed and normal in appearance- No evidence of renal mass. No perinephric fluid collection present, gallbladder is mildly distended- gallstones within the gallbladder neck,  No pericholecystic inflammatory changes or biliary dilatation, Constipation,  No obstruction or free air, Normal appendix, Calcified fibroid within the lower uterine segment, Mild constipation, No obstruction or free air, Normal appendix, Small fat-containing periumbilical hernia, Previous kyphoplasty at T12, Previous fusion at L3-L4 and L4-L5, Left basilar atelectasis..  Recommendation from urology-evaluation of neurogenic bladder at a university setting like [place].     Obstructing renal calculi left side/moderate dilated of left ureter.  Callus 5 x 6 mm and left 4 to 5 cm above the UVJ junction.  Status post 6/21/2024 urethroscope laser lithotripsy with stent insertion left, remove stent and Horan cath 6/29/2024.     Gout . New problem RLE pain with elevated uric acid level, continue colchicine.  Continue allopurinol.     Reflux/esophageal dysphagia. Dysphagia recent esophageal stretching, GI evaluated and recommended to  continue outpatient follow-up.  SLP evaluated and recommended to continue regular diet.  Protonix . Zofran as needed.     Altered mental status/encephalopathy.  Back to baseline.  Consult neurology  She does not seem to receive anything overnight that could cause worsening mental status changes.  Metronidazole has an incidence of encephalopathy but average is a 28 days and worsening patients with liver disease which makes shortness seem to have at this point.  CT scan the head-No acute intracranial abnormality, Chronic sinusitis and left mastoid effusion.  Repeat this morning.  Medications reviewed    Hypoxia.  Resolved.  Chest x-ray-Stable chest exam without acute process, No visualized infiltrate.   Patient is on room air.     Rheumatoid arthritis.  Rheumatoid factor quantitative normal.  Arava.  Prednisone.  Lovenox full dose.   Doppler right lower extremity and NISREEN normal.  Pain control.     Hypothyroidism.  Synthroid.     Chronic stage IIIb renal failure.  Creatinine normalized.  Hold Lasix.      Hypomagnesia. Resolved.     Hyponatremia.  Resolved.     Hypokalemia.  Resolved.  Magnesium normal.     Right gluteal wound.  Consult wound care.     Rheumatoid arthritis. Arava.     Depression/anxiety.  Cymbalta.     Postmenopausal.  Estradiol cream.     Anemia.  Hemoglobin decreased.  Iron sulfate.  Folic acid.  No sign of acute bleed.     Pain . lidocaine patch.  Voltaren gel.  Ultram as needed.     Chronic DVT.  DC Eliquis due to possible surgery, on Lovenox.     Functional neurological symptom disorder with paraplegia.     Nutrition.  Cardiac diet.  Vitamin C.  Multivitamins.  Regular/house diet.  Boost supplement.     Deconditioning.  PT and OT consult.  Patient use a walker and bedbound at baseline.     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  Last urine culture shows no growth.  Blood culture-no growth for 5 days.     Dr. Shin's mom.  Patient  already have home health at home.  Patient request for lift at discharge.  Patient wants to go to [facility] rehab.  Consult .     Medical Decision Making  Number and Complexity of problems: Recurrent UTI/altered mental status/reflux/rheumatoid arthritis/chronic 3B renal failure  Differential Diagnosis: None     Conditions and Status        Condition is unchanged.     [facility] Data  External documents reviewed: Previous note .  Cardiac tracing (EKG, telemetry) interpretation: Sinus.  Radiology interpretation: X-ray/CT scan of the head  Labs reviewed: Laboratory  Any tests that were considered but not ordered: Lab in a.m.     Decision rules/scores evaluated (example [id number], Wells, etc): None     Discussed with: Patient and family     Care Planning  Shared decision making: Patient and family  Code status and discussions: DNR     Disposition  Social Determinants of Health that impact treatment or disposition: From home  Plan for rehab.    Electronically signed by Saulo Ambriz MD, 07/05/24, 09:20 CDT.

## 2024-07-05 NOTE — PLAN OF CARE
Goal Outcome Evaluation:  Plan of Care Reviewed With: patient        Progress: improving  Outcome Evaluation: OT tx completed. Pt in fowlers & fatigued, but agreeable to therapy tx in bed. Pt completed 3 x 10 reps BUE strengthening utilizing 2 lb DBs to maxize strength needed for fxnal transfers & ADLs. RB in btwn each set. After ther ex, pt was able to roll L & R with Jose R, required maxA for brief change & john hygiene. Excoriation noted surrounding groin area, RN aware. Cont OT POC. She would benefit from cont rehab at d/c                                no

## 2024-07-05 NOTE — PLAN OF CARE
Goal Outcome Evaluation:  Plan of Care Reviewed With: patient, caregiver        Progress: no change  Outcome Evaluation: A&Ox4. C/o abdominal incision pain prn pain med with relief. WILNER. Lisa matress. Wound care to rt gluteal. Turning q 2. Incontinent. Groin exscoriated, cream applied. Pills whole in applesauce. Sitter at bs.  Sleeping between care.

## 2024-07-05 NOTE — DISCHARGE PLACEMENT REQUEST
"Laura Shin (68 y.o. Female)       Date of Birth   1955    Social Security Number       Address   [address] [place] [address]    Home Phone   [phone]    MRN   [id number]       [employer]   [employer]    Marital Status                               Admission Date   6/12/24    Admission Type   Emergency    Admitting Provider   Saulo Ambriz MD    Attending Provider   Saulo Ambriz MD    Department, Room/Bed   [facility] 3A, 338/1       Discharge Date       Discharge Disposition       Discharge Destination                                 Attending Provider: Saulo Ambriz MD    Allergies: Atorvastatin, Amoxicillin, Escitalopram, Nabumetone, Niacin Er, Penicillin G, Penicillins, Simvastatin    Isolation: Contact   Infection: MRSA (07/22/22), MDR Pseudomonas (12/15/22), CR Pseudomonas CRPA (03/22/23), CRE (05/05/24)   Code Status: No CPR    Ht: 167.6 cm (66\")   Wt: 94.8 kg (209 lb)    Admission Cmt: None   Principal Problem: Altered mental status [R41.82]                   Active Insurance as of 6/12/2024       Primary Coverage       Payor Plan Insurance Group Employer/Plan Group    MEDICARE MEDICARE A & B        Payor Plan Address Payor Plan Phone Number Payor Plan Fax Number Effective Dates    [address] [phone]  3/1/2014 - None Entered    [facility] [address]         Subscriber Name Subscriber Birth Date Member ID       LAURA SHIN 1955 [id number]               Secondary Coverage       Payor Plan Insurance Group Employer/Plan Group    [employer] SUP AAR HEALTH CARE OPTIONS        Payor Plan Address Payor Plan Phone Number Payor Plan Fax Number Effective Dates    [facility] [phone]  1/1/2019 - None Entered    [address]       [facility] [address]         Subscriber Name Subscriber Birth Date Member ID       LAURA SHIN 1955 [id number]                     Emergency Contacts        (Rel.) Home Phone Work Phone Mobile " Phone    [employer] (Son) [phone] -- [phone]    Skye Retana (Sister) [phone] -- [phone]    Mackenzie Light (Daughter) [phone] -- [phone]              Insurance Information                  MEDICARE/MEDICARE A & B Phone: [phone]    Subscriber: Tawny Shin Subscriber#: [id number]    Group#: -- Precert#: --        [facility] HEALTH CARE OPTIONS Phone: [phone]    Subscriber: Tawny Shin Subscriber#: [id number]    Group#: -- Precert#: --

## 2024-07-05 NOTE — THERAPY TREATMENT NOTE
Acute Care - Physical Therapy Treatment Note  [facility]     Patient Name: Tawny Shin  : 1955  MRN: [id number]  Today's Date: 2024      Visit Dx:     ICD-10-CM ICD-9-CM   1. Acute UTI (urinary tract infection)  N39.0 599.0   2. Altered mental status, unspecified altered mental status type  R41.82 780.97   3. Open wound of right buttock, sequela  S31.819S 906.0   4. Cyst of buttocks  L72.9 706.2   5. Dysphagia, unspecified type  R13.10 787.20   6. Impaired mobility [Z74.09]  Z74.09 799.89   7. Hydronephrosis with urinary obstruction due to ureteral calculus  N13.2 592.1     591   8. Acute cholecystitis  K81.0 575.0   9. Acute congestive heart failure, unspecified heart failure type  I50.9 428.0     Patient Active Problem List   Diagnosis    T12 compression fracture, initial encounter    Chronic embolism and thrombosis of unspecified deep veins of left lower extremity    Chronic anticoagulation    Iron deficiency anemia    Osteoporosis    E coli bacteremia    Epidural hematoma    Pleural effusion, left    Functional neurological symptom disorder with weakness or paralysis    Class 1 obesity due to excess calories with serious comorbidity and body mass index (BMI) of 33.0 to 33.9 in adult    Rheumatoid arthritis involving multiple sites    Chronic heart failure with preserved ejection fraction    Venous insufficiency    Coronary artery disease involving native coronary artery of native heart without angina pectoris    Sick sinus syndrome    Essential hypertension    Pressure injury of skin of heel    Chronic pain    Anemia of chronic disease    Cholelithiasis    Pressure injury of skin of buttock    Near functional paraplegia    Skin cancer    Squamous cell carcinoma of back    Hematoma    Right-sided chest wall pain    Hyperlipidemia LDL goal <70    Malodorous urine    Stage 3b chronic kidney disease    Cyst of buttocks    Sepsis due to Escherichia coli without acute organ dysfunction    Generalized  weakness    Paralysis    History of urinary retention    Bacteremia due to Enterobacter species    Bacteremia    Hypothyroidism    Abnormal CT of the abdomen    Presence of cardiac pacemaker    Altered mental status    UTI (urinary tract infection)    Esophageal dysphagia    Hydronephrosis with urinary obstruction due to ureteral calculus    Acute cholecystitis    Acute gout     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    Elevated cholesterol     Eustachian tube dysfunction     Heart disease     Herpes simplex     History of transfusion     Hyperlipidemia     Hypertension     Hypothyroidism 12/23/2020    Intrinsic asthma 12/23/2020    Knee dislocation     Labral tear of right hip joint     Laryngitis sicca     Laryngitis, chronic     Left carotid bruit 03/09/2016    MI (myocardial infarction)     Myalgia due to statin 06/25/2019    Open wound of right hip 09/14/2021    Osteomyelitis of right femur 07/06/2021    Otorrhea     Pacemaker 11/17/2016    Primary osteoarthritis of left knee 12/23/2020    Psoriasis vulgaris 12/23/2020    S/P coronary artery stent placement 03/09/2016    Sensorineural hearing loss     Seropositive rheumatoid arthritis of multiple sites 12/27/2019    Overview:  -myochrysine '93-'96 -methotrexate '96--->11/98;r/s  restarted 2/99--> 8/14 (anemia) -sulfasalazine- not effective -penicillamine 6/98-->10/98; no effect -leflunomide 11/98--> - Humira '13-->didn't take - Enbrel  "12/14-->3/15- no effect!   Last Assessment & Plan:  - \"aching all over\" because she had to be off her anti-rheumatic drugs for 2 weeks in preparation for her R knee surgery - he    Sick sinus syndrome 12/27/2019    Sjogren's disease     Spondylolisthesis of lumbar region 01/17/2018    Syncope, recurrent 02/08/2021    Urinary tract infection     UTI (urinary tract infection) 04/19/2024     Past Surgical History:   Procedure Laterality Date    A-V CARDIAC PACEMAKER INSERTION  2016    ATRIAL CARDIAC PACEMAKER INSERTION      CARDIAC CATHETERIZATION      CATARACT EXTRACTION      CERVICAL CORPECTOMY N/A 3/3/2021    Procedure: CERVICAL 6 CORPECTOMY WITH TITANIUM CAGE WITH NEURO MONITORING;  Surgeon: Bandar Shea MD;  Location:  [facility];  Service: Neurosurgery;  Laterality: N/A;    COLONOSCOPY  11/08/2011    One fold in the ascending colon which showed ulcer otherwise normal exam    COLONOSCOPY  11/12/2004    Normal exam repeat in five years    CORONARY ANGIOPLASTY WITH STENT PLACEMENT      X 2; 2013 & 2014    ENDOSCOPY  07/10/2014    Normal exam    ENDOSCOPY N/A 5/30/2024    Procedure: ESOPHAGOGASTRODUODENOSCOPY WITH ANESTHESIA;  Surgeon: Taiwo Sanchez MD;  Location: [facility] ENDOSCOPY;  Service: Gastroenterology;  Laterality: N/A;  preop; dysphagia  postop: balloon dilation  PCP Jesus Manuel jeff    FLAP LEG Right 9/14/2021    Procedure: RIGHT GLUTEAL FASCIOCUTANEOUS ADVANCEMENT FLAP AND RIGHT TENSOR FASCIAL JESSICA FLAP;  Surgeon: Amadeo Turner MD;  Location:  [facility];  Service: Plastics;  Laterality: Right;    HIP ABDUCTION TENOTOMY BILATERAL Right 1/14/2021    Procedure: RIGHT HIP GLUTEUS MEDLUS / MINIMUS REPAIR, POSSIBLE ACHILLES ALLOGRAFT;  Surgeon: Nino Carlson MD;  Location:  [facility];  Service: Orthopedics;  Laterality: Right;    INCISION AND DRAINAGE ABSCESS Right 6/4/2022    Procedure: INCISION AND DRAINAGE ABSCESS right hip;  Surgeon: Magda Salcido MD;  Location:  [facility];  Service: General;  " Laterality: Right;    INCISION AND DRAINAGE ABSCESS Right 6/10/2022    Procedure: RIGHT HIP INCISION AND DRAINAGE. MD NEEDS 3L VANC IRRIGATION, CURRETTES, DAICANS, KERLEX ROLLS;  Surgeon: Amadeo Turner MD;  Location:  [facility];  Service: Plastics;  Laterality: Right;    INCISION AND DRAINAGE HIP Right 2/9/2021    Procedure: HIP INCISION AND DRAINAGE;  Surgeon: Nino Carlson MD;  Location:  [facility];  Service: Orthopedics;  Laterality: Right;    INCISION AND DRAINAGE LEG Right 10/24/2021    Procedure: INCISION AND DRAINAGE LOWER EXTREMITY;  Surgeon: Amadeo Turner MD;  Location:  [facility];  Service: Plastics;  Laterality: Right;    INCISION AND DRAINAGE OF WOUND Right 7/8/2021    Procedure: INCISION AND DRAINAGE WOUND RIGHT HIP;  Surgeon: James Huntley MD;  Location:  [facility];  Service: Orthopedics;  Laterality: Right;    JOINT REPLACEMENT      KYPHOPLASTY WITH BIOPSY Bilateral 10/26/2021    Procedure: THOARCIC 12 KYPHOPLASTY WITH BIOPSY;  Surgeon: Bandar Shea MD;  Location:  [facility];  Service: Neurosurgery;  Laterality: Bilateral;    LEG DEBRIDEMENT Right 9/14/2021    Procedure: DEBRIDEMENT OF RIGHT HIP WOUND, RIGHT GLUTEAL FASCIOCUTANEOUS ADVANCEMENT FLAP AND RIGHT TENSOR FASCIAL JESSICA FLAP;  Surgeon: Amadeo Turner MD;  Location:  [facility];  Service: Plastics;  Laterality: Right;    LUMBAR DISCECTOMY Right 3/23/2021    Procedure: LUMBAR DISCECTOMY MICRO, Lumbar 1/2 right;  Surgeon: Bandar Shea MD;  Location:  [facility];  Service: Neurosurgery;  Laterality: Right;    LUMBAR FUSION N/A 1/19/2018    Procedure: L3-4,L4-5 DECOMPRESSION, POSTERIOR SPINAL FUSION WITH INSTRUMENTATION;  Surgeon: Fortino Oropeza MD;  Location:  [facility];  Service:     LUMBAR LAMINECTOMY WITH FUSION Left 1/17/2018    Procedure: LEFT L3-4 L4-5 LATERAL LUMBAR INTERBODY FUSION;  Surgeon: Fortino Oropeza MD;  Location:  [facility];  Service:     MASS EXCISION Right 4/23/2024    Procedure: RIGHT  BUTTOCK MASS EXCISION;  Surgeon: Moises Keyes MD;  Location:  [facility];  Service: General;  Laterality: Right;    MYRINGOTOMY W/ TUBES  09/04/2014    TUBES NO LONGER IN PLACE    OTHER SURGICAL HISTORY      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 so hardware was removed and spacers were placed    REPLACEMENT TOTAL KNEE Right     URETEROSCOPY LASER LITHOTRIPSY WITH STENT INSERTION N/A 6/21/2024    Procedure: URETEROSCOPY LASER LITHOTRIPSY WITH STENT INSERTION LEFT;  Surgeon: Ky Plascencia MD;  Location:  [facility];  Service: Urology;  Laterality: N/A;     PT Assessment (Last 12 Hours)       PT Evaluation and Treatment       Row Name 07/05/24 0957          Physical Therapy Time and Intention    Subjective Information complains of;pain  -KINGS     Document Type therapy note (daily note)  -KINGS     Mode of Treatment physical therapy  -KINGS       Row Name 07/05/24 0957          General Information    Existing Precautions/Restrictions fall  -KINGS       Row Name 07/05/24 0957          Pain    Pretreatment Pain Rating 4/10  -KINGS     Posttreatment Pain Rating 6/10  -KINGS     Pain Location - Side/Orientation Right  -KINGS     Pain Location lower  -KINGS     Pain Location - abdomen  -KINGS     Pre/Posttreatment Pain Comment surgical sight  -KINGS     Pain Intervention(s) Repositioned  -KINGS       Row Name 07/05/24 0957          Bed Mobility    Supine-Sit Proctor (Bed Mobility) verbal cues;minimum assist (75% patient effort)  -KINGS     Sit-Supine [place] (Bed Mobility) verbal cues;minimum assist (75% patient effort)  -KINGS     Assistive Device (Bed Mobility) bed rails;head of bed elevated  -KINGS       Row Name 07/05/24 0957          Transfers    Comment, (Transfers) stood x 4, focused on maintaining standing .  Able to hold for a few seconds each time  -KINGS       Row Name 07/05/24 0957          Sit-Stand Transfer    Sit-Stand [place] (Transfers) verbal cues;moderate assist (50% patient effort)  -KINGS     Assistive Device (Sit-Stand Transfers) --  [facility]   -KINGS       Row Name 07/05/24 0957          Stand-Sit Transfer    Stand-Sit [place] (Transfers) verbal cues;minimum assist (75% patient effort)  -KINGS       Row Name 07/05/24 0957          Motor Skills    Comments, Therapeutic Exercise in fowlers and sitting, AROM/AAROM BLE X 15  -KINGS       Row Name             Wound 04/23/24 0936 Right gluteal Incision    Wound - Properties Group Placement Date: 04/23/24  -EP Placement Time: 0936  -EP Present on Original Admission: N  -EP Side: Right  -EP Location: gluteal  -EP Primary Wound Type: Incision  -EP    Retired Wound - Properties Group Placement Date: 04/23/24  -EP Placement Time: 0936  -EP Present on Original Admission: N  -EP Side: Right  -EP Location: gluteal  -EP Primary Wound Type: Incision  -EP    Retired Wound - Properties Group Date first assessed: 04/23/24  -EP Time first assessed: 0936  -EP Present on Original Admission: N  -EP Side: Right  -EP Location: gluteal  -EP Primary Wound Type: Incision  -EP      Row Name             Wound 07/03/24 0947 abdomen Incision    Wound - Properties Group Placement Date: 07/03/24  -HW Placement Time: 0947 -HW Location: abdomen  -HW Primary Wound Type: Incision  -HW    Retired Wound - Properties Group Placement Date: 07/03/24  -HW Placement Time: 0947 -HW Location: abdomen  -HW Primary Wound Type: Incision  -HW    Retired Wound - Properties Group Date first assessed: 07/03/24  - Time first assessed: 0947 -HW Location: abdomen  -HW Primary Wound Type: Incision  -HW      Row Name 07/05/24 0957          Positioning and Restraints    Pre-Treatment Position in bed  -KINGS     Post Treatment Position bed  -KINGS     In Bed fowlers;call light within reach;encouraged to call for assist;with family/caregiver;side rails up x2  -KINGS               User Key  (r) = Recorded By, (t) = Taken By, (c) = Cosigned By      Initials Name Provider Type    Garcia Jaramillo PTA Physical Therapist Assistant    HW Benjie Motta, RN Registered Nurse     Sonny Thompson, RN Registered Nurse                    Physical Therapy Education       Title: PT OT SLP Therapies (Done)       Topic: Physical Therapy (Done)       Point: Mobility training (Done)       Learning Progress Summary             Patient Acceptance, E, VU,DU by KS at 7/4/2024 1722    Acceptance, E, VU by KR at 7/4/2024 1208    Acceptance, E, VU by KS at 6/20/2024 1715    Acceptance, E, VU by MS at 6/18/2024 1502    Comment: role of PT in her care   Caregiver Acceptance, E, VU,DU by KS at 7/4/2024 1722                         Point: Home exercise program (Done)       Learning Progress Summary             Patient Acceptance, E, VU,DU by KS at 7/4/2024 1722    Acceptance, E, VU by KS at 6/20/2024 1715    Acceptance, E,D,TB, VU,DU,NR by  at 6/15/2024 1514    Acceptance, E,TB,D, VU,DU,NR by  at 6/14/2024 1550   Caregiver Acceptance, E, VU,DU by KS at 7/4/2024 1722                         Point: Body mechanics (Done)       Learning Progress Summary             Patient Acceptance, E, VU,DU by KS at 7/4/2024 1722    Acceptance, E, VU by KS at 6/20/2024 1715    Acceptance, E,D,TB, VU,DU,NR by  at 6/15/2024 1514    Acceptance, E,TB,D, VU,DU,NR by  at 6/14/2024 1550   Caregiver Acceptance, E, VU,DU by KS at 7/4/2024 1722                         Point: Precautions (Done)       Learning Progress Summary             Patient Acceptance, E, VU,DU by KS at 7/4/2024 1722    Acceptance, E, VU by KR at 7/4/2024 1208    Acceptance, E, VU by KS at 6/20/2024 1715    Acceptance, E,D,TB, VU,DU,NR by  at 6/15/2024 1514    Acceptance, E,TB,D, VU,DU,NR by  at 6/14/2024 1550   Caregiver Acceptance, E, VU,DU by KS at 7/4/2024 1722                                         User Key       Initials Effective Dates Name Provider Type Discipline    HERB 06/03/24 -  Yue Hale, PT DPT Physical Therapist PT    MS 07/11/23 -  Nicole Olson, PT, DPT, NCS Physical Therapist PT    KS 02/27/23 -  Ewelina Morales, RN  Registered Nurse Nurse    ROGERS 10/17/23 -  Stephanie Doll RN Registered Nurse Nurse                  PT Recommendation and Plan     Plan of Care Reviewed With: patient  Progress: improving  Outcome Evaluation: Pt was in bed, eager to work with therapy.  Able to transfer supine to sitting with min assist.  Transfered sit to stand with mod assist and HHA.  Focused on maintaining standing with HHA and min assist, able to hold a few seconds each time.  Pt c/o increase pain on abdomend from sx.  Will continue to work with pt to increase strength and progress with transfers as pt is able.   Outcome Measures       Row Name 07/05/24 0957 07/04/24 1200 07/02/24 1500       How much help from another person do you currently need...    Turning from your back to your side while in flat bed without using bedrails? 3  -KINGS -- --    Moving from lying on back to sitting on the side of a flat bed without bedrails? 3  -KINGS -- --    Moving to and from a bed to a chair (including a wheelchair)? 1  -KINGS -- --    Standing up from a chair using your arms (e.g., wheelchair, bedside chair)? 2  -KINGS -- --    Climbing 3-5 steps with a railing? 1  -KINGS -- --    To walk in hospital room? 1  -KINGS -- --    AM-PAC 6 Clicks Score (PT) 11  -KINGS -- --    Highest Level of Mobility Goal 4 --> Transfer to chair/commode  -KINGS -- --       How much help from another is currently needed...    Putting on and taking off regular lower body clothing? -- 1  -EC 2  -TS    Bathing (including washing, rinsing, and drying) -- 2  -EC 2  -TS    Toileting (which includes using toilet bed pan or urinal) -- 2  -EC 2  -TS    Putting on and taking off regular upper body clothing -- 3  -EC 3  -TS    Taking care of personal grooming (such as brushing teeth) -- 3  -EC 3  -TS    Eating meals -- 4  -EC 4  -TS    AM-PAC 6 Clicks Score (OT) -- 15  -EC 16  -TS       Functional Assessment    Outcome Measure Options AM-PAC 6 Clicks Basic Mobility (PT)  -KINGS AM-PAC 6 Clicks Daily Activity (OT)   - --      Row Name 07/02/24 1100             How much help from another person do you currently need...    Turning from your back to your side while in flat bed without using bedrails? 3  -AH      Moving from lying on back to sitting on the side of a flat bed without bedrails? 3  -AH      Moving to and from a bed to a chair (including a wheelchair)? 2  -AH      Standing up from a chair using your arms (e.g., wheelchair, bedside chair)? 2  -AH      Climbing 3-5 steps with a railing? 1  -AH      To walk in hospital room? 1  -AH      AM-PAC 6 Clicks Score (PT) 12  -      Highest Level of Mobility Goal 4 --> Transfer to chair/commode  -         Functional Assessment    Outcome Measure Options AM-PAC 6 Clicks Basic Mobility (PT)  -                User Key  (r) = Recorded By, (t) = Taken By, (c) = Cosigned By      Initials Name Provider Type     Olga Chambers, SERENA Physical Therapist Assistant    Carolee Roger COTA Occupational Therapist Assistant    Garcia Jaramillo, SERENA Physical Therapist Assistant    Ashley Nash, OTR/L Occupational Therapist                     Time Calculation:    PT Charges       Row Name 07/05/24 0957             Time Calculation    Start Time 0957  -KINGS      Stop Time 1029  -KINGS      Time Calculation (min) 32 min  -      PT Received On 07/05/24  -KINGS         Time Calculation- PT    Total Timed Code Minutes- PT 32 minute(s)  -KINGS         Timed Charges    94694 - PT Therapeutic Exercise Minutes 17  -KINGS      31934 - PT Therapeutic Activity Minutes 15  -KINGS         Total Minutes    Timed Charges Total Minutes 32  -KINGS       Total Minutes 32  -KINGS                User Key  (r) = Recorded By, (t) = Taken By, (c) = Cosigned By      Initials Name Provider Type    Garcia Jaramillo, SERENA Physical Therapist Assistant                  Therapy Charges for Today       Code Description Service Date Service Provider Modifiers Qty    76231796511  PT THERAPEUTIC ACT EA 15 MIN 7/5/2024  Garcia Francis, PTA GP 1    99207104062 HC PT THER PROC EA 15 MIN 7/5/2024 Garcia Francis, PTA GP 1            PT G-Codes  Outcome Measure Options: AM-PAC 6 Clicks Basic Mobility (PT)  AM-PAC 6 Clicks Score (PT): 11  AM-PAC 6 Clicks Score (OT): 15    Garcia Francis PTA  7/5/2024

## 2024-07-05 NOTE — PROGRESS NOTES
"INFECTIOUS DISEASES PROGRESS NOTE    Patient:  Tawny Shin  YOB: 1955  MRN: [id number]   Admit date: 6/12/2024   Admitting Physician: Saulo Ambriz MD  Primary Care Physician: Moises Oliveira MD    Chief Complaint: Working with therapy.    Interval History: Still complains of some postsurgical pain but thinks it may be getting \"a little\" better every day.  Noted that chart reflects that patient [facility].    Clarified that her right leg is better and that she is normally on 5 mg of prednisone daily    Allergies:   Allergies   Allergen Reactions    Atorvastatin Other (See Comments)     LEG CRAMPS      Amoxicillin Rash    Escitalopram Rash    Nabumetone Rash    Niacin Er Rash    Penicillin G Rash    Penicillins Rash    Simvastatin Rash       Current Scheduled Medications:   allopurinol, 100 mg, Oral, Daily  apixaban, 5 mg, Oral, BID  ascorbic acid, 500 mg, Oral, Daily  aspirin, 81 mg, Oral, Daily  carvedilol, 25 mg, Oral, BID With Meals  colchicine, 0.6 mg, Oral, Daily  DULoxetine, 60 mg, Oral, Daily  estradiol, 2 g, Vaginal, Once per day on Monday Thursday  ferrous sulfate, 325 mg, Oral, Daily With Breakfast  folic acid, 1,000 mcg, Oral, Daily  [Held by provider] furosemide, 40 mg, Oral, Daily  indocyanine green, 1.25 mg, Intravenous, Once  isosorbide mononitrate, 60 mg, Oral, Daily  leflunomide, 20 mg, Oral, Daily  levothyroxine, 75 mcg, Oral, Q AM  losartan, 50 mg, Oral, Daily  methenamine, 1 g, Oral, BID  multivitamin with minerals, 1 tablet, Oral, Daily  pantoprazole, 40 mg, Oral, Daily  predniSONE, 20 mg, Oral, Daily With Breakfast  sodium chloride, 10 mL, Intravenous, Q12H  spironolactone, 25 mg, Oral, Daily      Current PRN Medications:    acetaminophen    senna-docusate sodium **AND** polyethylene glycol **AND** bisacodyl **AND** bisacodyl    Calcium Replacement - Follow Nurse / BPA Driven Protocol    Diclofenac Sodium    guaifenesin    Lidocaine    " "Magnesium Low Dose Replacement - Follow Nurse / BPA Driven Protocol    [DISCONTINUED] Morphine **AND** naloxone    nitroglycerin    ondansetron    oxyCODONE    Phosphorus Replacement - Follow Nurse / BPA Driven Protocol    Potassium Replacement - Follow Nurse / BPA Driven Protocol    sodium chloride    sodium chloride    sodium chloride    traMADol    lactated ringers, 100 mL/hr, Last Rate: 100 mL/hr (24 0923)             Objective     Vital Signs:  Temp (24hrs), Av.9 °F (36.6 °C), Min:97.5 °F (36.4 °C), Max:98.2 °F (36.8 °C)      /67 (BP Location: Right arm, Patient Position: Lying)   Pulse 98   Temp 97.9 °F (36.6 °C) (Oral)   Resp 16   Ht 167.6 cm (66\")   Wt 94.8 kg (209 lb)   LMP  (LMP Unknown)   SpO2 94%   BMI 33.73 kg/m²         Physical Exam:    General: Patient sitting up at bedside with physical therapy.  Respiratory: Effort even and unlabored  Right lower extremity no erythema.  Edema significantly improved.  No pain on palpation    Results Review:    I reviewed the patient's new clinical results.    Lab Results:    CBC:   Lab Results   Lab 2452 24  04224  0603 24  0600   WBC 4.53 4.29 5.42 4.81 5.98   HEMOGLOBIN 8.1* 8.0* 8.5* 7.5* 7.9*   HEMATOCRIT 27.2* 26.9* 29.1* 25.3* 26.7*   PLATELETS 151 142 194 206 228        AutoDiff:            Manual Diff:          Invalid input(s): \"MONABS\"          CMP:   Lab Results   Lab 24  0552 24  0424 24  0603   SODIUM 138 139 136 136   POTASSIUM 3.8 3.8 4.3 5.0   CHLORIDE 103 105 102 103   CO2 27.0 26.0 23.0 24.0   BUN 13 12 15 19   CREATININE 1.10* 0.87 0.91 0.89   CALCIUM 8.7 8.6 9.0 8.9   BILIRUBIN 0.2  --   --  0.2   ALK PHOS 86  --   --  82   ALT (SGPT) 21  --   --  26   AST (SGOT) 25  --   --  43*   GLUCOSE 92 92 123* 122*       Estimated Creatinine Clearance: 68.2 mL/min (by C-G formula based on SCr of 0.89 mg/dL).    Culture Results:    Microbiology Results " (last 10 days)       ** No results found for the last 240 hours. **             C-Reactive Protein   Date Value Ref Range Status   06/29/2024 4.04 (H) 0.00 - 0.50 mg/dL Final   07/18/2022 3.34 (H) 0.00 - 0.50 mg/dL Final   07/15/2022 2.92 (H) 0.00 - 0.50 mg/dL Final     Sed Rate   Date Value Ref Range Status   07/05/2022 75 (H) 0 - 30 mm/hr Final   06/30/2022 72 (H) 0 - 30 mm/hr Final   06/27/2022 38 (H) 0 - 30 mm/hr Final             Radiology:         Imaging Results (Last 72 Hours)       Procedure Component Value Units Date/Time    FL Cholangiogram Operative [[id number]] Collected: 07/03/24 1038     Updated: 07/03/24 1043    Narrative:      EXAMINATION: FL CHOLANGIOGRAM OPERATIVE-     7/3/2024 9:00 AM     HISTORY: stones; N39.0-Urinary tract infection, site not specified;  R41.82-Altered mental status, unspecified; S31.819S-Unspecified open  wound of right buttock, sequela; L72.9-Follicular cyst of the skin and  subcutaneous tissue, unspecified; R13.10-Dysphagia, unspecified;  Z74.09-Other reduced mobility; N13.2-Hydronephrosis with renal and  ureteral calculous obstruction; K81.0-Acute cholecystitis; I50.9-Hea     Number of fluoroscopic spot images obtained = 1.     Fluoroscopy dose in Air Kerma mGy = 4.6865.     Fluoroscopy total dose area product (Gycm2) = 2.0600.     Fluoroscopy total exposure time = 19.0 seconds     Spot images obtained at the time of laparoscopic cholecystectomy show  cannulation of the cystic duct with contrast injection for opacification  of the biliary tree.     No filling defect is seen.     The intrahepatic bile ducts have a normal appearance.     There is no bile duct obstruction as contrast passes into the duodenum.     Summary :  1. No abnormality is seen.                    This report was signed and finalized on 7/3/2024 10:40 AM by Dr. Jesus Manuel Santos MD.       FL C Arm During Surgery [[id number]] Resulted: 07/03/24 1038     Updated: 07/03/24 1038    Narrative:      This procedure  was auto-finalized with no dictation required.                Active Hospital Problems    Diagnosis     **Altered mental status     Acute gout     Esophageal dysphagia     UTI (urinary tract infection)     Hydronephrosis with urinary obstruction due to ureteral calculus     Acute cholecystitis     Hypothyroidism     Stage 3b chronic kidney disease     Near functional paraplegia     Cholelithiasis     Essential hypertension     Sick sinus syndrome     Coronary artery disease involving native coronary artery of native heart without angina pectoris     Venous insufficiency     Rheumatoid arthritis involving multiple sites     Chronic heart failure with preserved ejection fraction     Class 1 obesity due to excess calories with serious comorbidity and body mass index (BMI) of 33.0 to 33.9 in adult     Functional neurological symptom disorder with weakness or paralysis     Chronic anticoagulation     Chronic embolism and thrombosis of unspecified deep veins of left lower extremity        IMPRESSION:  Recurrent urinary tract infections in patient admitted with Carbapenem resistant Enterobacter cloacae isolated from urine she completed a course of cefepime June 25..  Patient with gallbladder studies revealing cholelithiasis.  She underwent cholecystectomy and intraoperative cholangiogram July 3.  Findings of inflamed and distended gallbladder noted.     Obstructive left ureteral stone status post cystoscopy and left ureteral stent placement on June 21 Dr. Plascencia.  Indwelling catheter and stent removed 6/29  Right gluteal wound  Overall deconditioning-PT OT following        RECOMMENDATION:     Gout treatment per hospitalist-patient had right lower extremity pain and edema.  Vascular studies were negative.  She was placed on allopurinol per hospitalist and started on prednisone 20 mg p.o. daily.  Wean back to daily dose of 5 mg?  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  Continue  local wound care to right gluteal wound  Continue to encourage working with PT/OT      Diseases will sign off, please reconsult if needed      Maggie Bang MD  07/05/24  10:17 CDT

## 2024-07-05 NOTE — CASE MANAGEMENT/SOCIAL WORK
Continued Stay Note   [facility]     Patient Name: Tawny hSin  MRN: [id number]  Today's Date: 7/5/2024    Admit Date: 6/12/2024    Plan: [facility]   Discharge Plan       Row Name 07/05/24 1025       Plan    Plan Comments Received consult for [facility] rehab. SW spoke to pt and she is agreeable for a referral to be sent to [facility]. Referral has been sent to [facility], await answer.                   Discharge Codes    No documentation.                 Expected Discharge Date and Time       Expected Discharge Date Expected Discharge Time    Jul 2, 2024               OSIEL Pizano

## 2024-07-05 NOTE — PLAN OF CARE
Goal Outcome Evaluation:  Plan of Care Reviewed With: patient        Progress: improving     Pt A&Ox4; VSS; contact precautions continued; pt calm and cooperative; pain varied from 2-4 and controlled with prn pain meds. Safety maintained.

## 2024-07-05 NOTE — THERAPY TREATMENT NOTE
Acute Care - Occupational Therapy Treatment Note  [facility]     Patient Name: Tawny Shin  : 1955  MRN: [id number]  Today's Date: 2024     Date of Referral to OT: 24       Admit Date: 2024       ICD-10-CM ICD-9-CM   1. Acute UTI (urinary tract infection)  N39.0 599.0   2. Altered mental status, unspecified altered mental status type  R41.82 780.97   3. Open wound of right buttock, sequela  S31.819S 906.0   4. Cyst of buttocks  L72.9 706.2   5. Dysphagia, unspecified type  R13.10 787.20   6. Impaired mobility [Z74.09]  Z74.09 799.89   7. Hydronephrosis with urinary obstruction due to ureteral calculus  N13.2 592.1     591   8. Acute cholecystitis  K81.0 575.0   9. Acute congestive heart failure, unspecified heart failure type  I50.9 428.0     Patient Active Problem List   Diagnosis    T12 compression fracture, initial encounter    Chronic embolism and thrombosis of unspecified deep veins of left lower extremity    Chronic anticoagulation    Iron deficiency anemia    Osteoporosis    E coli bacteremia    Epidural hematoma    Pleural effusion, left    Functional neurological symptom disorder with weakness or paralysis    Class 1 obesity due to excess calories with serious comorbidity and body mass index (BMI) of 33.0 to 33.9 in adult    Rheumatoid arthritis involving multiple sites    Chronic heart failure with preserved ejection fraction    Venous insufficiency    Coronary artery disease involving native coronary artery of native heart without angina pectoris    Sick sinus syndrome    Essential hypertension    Pressure injury of skin of heel    Chronic pain    Anemia of chronic disease    Cholelithiasis    Pressure injury of skin of buttock    Near functional paraplegia    Skin cancer    Squamous cell carcinoma of back    Hematoma    Right-sided chest wall pain    Hyperlipidemia LDL goal <70    Malodorous urine    Stage 3b chronic kidney disease    Cyst of buttocks    Sepsis due to  Escherichia coli without acute organ dysfunction    Generalized weakness    Paralysis    History of urinary retention    Bacteremia due to Enterobacter species    Bacteremia    Hypothyroidism    Abnormal CT of the abdomen    Presence of cardiac pacemaker    Altered mental status    UTI (urinary tract infection)    Esophageal dysphagia    Hydronephrosis with urinary obstruction due to ureteral calculus    Acute cholecystitis    Acute gout     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    Elevated cholesterol     Eustachian tube dysfunction     Heart disease     Herpes simplex     History of transfusion     Hyperlipidemia     Hypertension     Hypothyroidism 12/23/2020    Intrinsic asthma 12/23/2020    Knee dislocation     Labral tear of right hip joint     Laryngitis sicca     Laryngitis, chronic     Left carotid bruit 03/09/2016    MI (myocardial infarction)     Myalgia due to statin 06/25/2019    Open wound of right hip 09/14/2021    Osteomyelitis of right femur 07/06/2021    Otorrhea     Pacemaker 11/17/2016    Primary osteoarthritis of left knee 12/23/2020    Psoriasis vulgaris 12/23/2020    S/P coronary artery stent placement 03/09/2016    Sensorineural hearing loss     Seropositive rheumatoid arthritis of multiple sites 12/27/2019    Overview:  -myochrysine '93-'96 -methotrexate '96--->11/98;r/s  restarted 2/99--> 8/14 (anemia) -sulfasalazine- not effective -penicillamine 6/98-->10/98; no effect  "-leflunomide 11/98--> - Humira '13-->didn't take - Enbrel 12/14-->3/15- no effect!   Last Assessment & Plan:  - \"aching all over\" because she had to be off her anti-rheumatic drugs for 2 weeks in preparation for her R knee surgery - he    Sick sinus syndrome 12/27/2019    Sjogren's disease     Spondylolisthesis of lumbar region 01/17/2018    Syncope, recurrent 02/08/2021    Urinary tract infection     UTI (urinary tract infection) 04/19/2024     Past Surgical History:   Procedure Laterality Date    A-V CARDIAC PACEMAKER INSERTION  2016    ATRIAL CARDIAC PACEMAKER INSERTION      CARDIAC CATHETERIZATION      CATARACT EXTRACTION      CERVICAL CORPECTOMY N/A 3/3/2021    Procedure: CERVICAL 6 CORPECTOMY WITH TITANIUM CAGE WITH NEURO MONITORING;  Surgeon: Bandar Shea MD;  Location: [facility] OR;  Service: Neurosurgery;  Laterality: N/A;    COLONOSCOPY  11/08/2011    One fold in the ascending colon which showed ulcer otherwise normal exam    COLONOSCOPY  11/12/2004    Normal exam repeat in five years    CORONARY ANGIOPLASTY WITH STENT PLACEMENT      X 2; 2013 & 2014    ENDOSCOPY  07/10/2014    Normal exam    ENDOSCOPY N/A 5/30/2024    Procedure: ESOPHAGOGASTRODUODENOSCOPY WITH ANESTHESIA;  Surgeon: Taiwo Sanchez MD;  Location: [facility] ENDOSCOPY;  Service: Gastroenterology;  Laterality: N/A;  preop; dysphagia  postop: balloon dilation  PCP Jesus Manuel jeff    FLAP LEG Right 9/14/2021    Procedure: RIGHT GLUTEAL FASCIOCUTANEOUS ADVANCEMENT FLAP AND RIGHT TENSOR FASCIAL JESSICA FLAP;  Surgeon: Amadeo Turner MD;  Location: [facility] OR;  Service: Plastics;  Laterality: Right;    HIP ABDUCTION TENOTOMY BILATERAL Right 1/14/2021    Procedure: RIGHT HIP GLUTEUS MEDLUS / MINIMUS REPAIR, POSSIBLE ACHILLES ALLOGRAFT;  Surgeon: Nino Carlson MD;  Location: [facility] OR;  Service: Orthopedics;  Laterality: Right;    INCISION AND DRAINAGE ABSCESS Right 6/4/2022    Procedure: INCISION AND DRAINAGE ABSCESS right hip;  Surgeon: Omari" Magda BECKMAN MD;  Location:  [facility] OR;  Service: General;  Laterality: Right;    INCISION AND DRAINAGE ABSCESS Right 6/10/2022    Procedure: RIGHT HIP INCISION AND DRAINAGE. MD NEEDS 3L VANC IRRIGATION, CURRLORIN, DAICANS, KERLEX ROLLS;  Surgeon: Amadeo Turner MD;  Location:  [facility] OR;  Service: Plastics;  Laterality: Right;    INCISION AND DRAINAGE HIP Right 2/9/2021    Procedure: HIP INCISION AND DRAINAGE;  Surgeon: Nino Carlson MD;  Location:  [facility] OR;  Service: Orthopedics;  Laterality: Right;    INCISION AND DRAINAGE LEG Right 10/24/2021    Procedure: INCISION AND DRAINAGE LOWER EXTREMITY;  Surgeon: Amadeo Turner MD;  Location:  [facility] OR;  Service: Plastics;  Laterality: Right;    INCISION AND DRAINAGE OF WOUND Right 7/8/2021    Procedure: INCISION AND DRAINAGE WOUND RIGHT HIP;  Surgeon: James Huntley MD;  Location:  [facility] OR;  Service: Orthopedics;  Laterality: Right;    JOINT REPLACEMENT      KYPHOPLASTY WITH BIOPSY Bilateral 10/26/2021    Procedure: THOARCIC 12 KYPHOPLASTY WITH BIOPSY;  Surgeon: Bandar Shea MD;  Location:  [facility] OR;  Service: Neurosurgery;  Laterality: Bilateral;    LEG DEBRIDEMENT Right 9/14/2021    Procedure: DEBRIDEMENT OF RIGHT HIP WOUND, RIGHT GLUTEAL FASCIOCUTANEOUS ADVANCEMENT FLAP AND RIGHT TENSOR FASCIAL JESSICA FLAP;  Surgeon: Amadeo Turner MD;  Location:  [facility] OR;  Service: Plastics;  Laterality: Right;    LUMBAR DISCECTOMY Right 3/23/2021    Procedure: LUMBAR DISCECTOMY MICRO, Lumbar 1/2 right;  Surgeon: Bandar Shea MD;  Location:  [facility] OR;  Service: Neurosurgery;  Laterality: Right;    LUMBAR FUSION N/A 1/19/2018    Procedure: L3-4,L4-5 DECOMPRESSION, POSTERIOR SPINAL FUSION WITH INSTRUMENTATION;  Surgeon: Fortino Oropeza MD;  Location:  [facility] OR;  Service:     LUMBAR LAMINECTOMY WITH FUSION Left 1/17/2018    Procedure: LEFT L3-4 L4-5 LATERAL LUMBAR INTERBODY FUSION;  Surgeon: Fortino Oropeza MD;  Location:  [facility] OR;  Service:      MASS EXCISION Right 4/23/2024    Procedure: RIGHT BUTTOCK MASS EXCISION;  Surgeon: Moises Keyes MD;  Location:  [facility] OR;  Service: General;  Laterality: Right;    MYRINGOTOMY W/ TUBES  09/04/2014    TUBES NO LONGER IN PLACE    OTHER SURGICAL HISTORY      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 so hardware was removed and spacers were placed    REPLACEMENT TOTAL KNEE Right     URETEROSCOPY LASER LITHOTRIPSY WITH STENT INSERTION N/A 6/21/2024    Procedure: URETEROSCOPY LASER LITHOTRIPSY WITH STENT INSERTION LEFT;  Surgeon: Ky Plascencia MD;  Location:  [facility] OR;  Service: Urology;  Laterality: N/A;         OT ASSESSMENT FLOWSHEET (Last 12 Hours)       OT Evaluation and Treatment       Row Name 07/05/24 1415                   OT Time and Intention    Subjective Information complains of;pain  -EC        Document Type therapy note (daily note)  -EC        Mode of Treatment occupational therapy  -EC           General Information    Patient Profile Reviewed yes  -EC        Existing Precautions/Restrictions fall  -EC           Pain Assessment    Pretreatment Pain Rating 3/10  -EC        Posttreatment Pain Rating 3/10  -EC        Pain Location - Side/Orientation Right  -EC        Pain Location incisional  -EC        Pain Location - abdomen  -EC        Pain Intervention(s) Repositioned  -EC           Activities of Daily Living    BADL Assessment/Intervention toileting  -EC           Toileting Assessment/Training    [place] Level (Toileting) change pad/brief;perform perineal hygiene;dependent (less than 25% patient effort)  -EC        Position (Toileting) supine  -EC           Bed Mobility    Bed Mobility rolling left;rolling right;scooting/bridging  -EC        Rolling Left [place] (Bed Mobility) verbal cues;minimum assist (75% patient effort)  -EC        Rolling Right [place] (Bed Mobility) verbal cues;minimum assist (75% patient effort)  -EC        Scooting/Bridging [place] (Bed Mobility) dependent (less  than 25% patient effort);2 person assist  -EC        Assistive Device (Bed Mobility) bed rails;draw sheet  -EC           Motor Skills    Therapeutic Exercise shoulder;elbow/forearm  3 x 10 reps BUE strengthening 2 lb DBs in supine  -EC           Wound 04/23/24 0936 Right gluteal Incision    Wound - Properties Group Placement Date: 04/23/24  -EP Placement Time: 0936  -EP Present on Original Admission: N  -EP Side: Right  -EP Location: gluteal  -EP Primary Wound Type: Incision  -EP    Retired Wound - Properties Group Placement Date: 04/23/24  -EP Placement Time: 0936  -EP Present on Original Admission: N  -EP Side: Right  -EP Location: gluteal  -EP Primary Wound Type: Incision  -EP    Retired Wound - Properties Group Date first assessed: 04/23/24  -EP Time first assessed: 0936  -EP Present on Original Admission: N  -EP Side: Right  -EP Location: gluteal  -EP Primary Wound Type: Incision  -EP       Wound 07/03/24 0947 abdomen Incision    Wound - Properties Group Placement Date: 07/03/24  -HW Placement Time: 0947 -HW Location: abdomen  -HW Primary Wound Type: Incision  -HW    Retired Wound - Properties Group Placement Date: 07/03/24  -HW Placement Time: 0947  -HW Location: abdomen  -HW Primary Wound Type: Incision  -HW    Retired Wound - Properties Group Date first assessed: 07/03/24  -HW Time first assessed: 0947 -HW Location: abdomen  -HW Primary Wound Type: Incision  -HW       Plan of Care Review    Plan of Care Reviewed With patient  -EC        Progress improving  -EC        Outcome Evaluation OT tx completed. Pt in fowlers & fatigued, but agreeable to therapy tx in bed. Pt completed 3 x 10 reps BUE strengthening utilizing 2 lb DBs to maxize strength needed for fxnal transfers & ADLs. RB in btwn each set. After ther ex, pt was able to roll L & R with Jose R, required maxA for brief change & john hygiene. Excoriation noted surrounding groin area, RN aware. Cont OT POC. She would benefit from cont rehab at d/c  EC            Positioning and Restraints    Pre-Treatment Position in bed  -EC        Post Treatment Position bed  -EC        In Bed fowlers;call light within reach;encouraged to call for assist;with family/caregiver  -EC                  User Key  (r) = Recorded By, (t) = Taken By, (c) = Cosigned By      Initials Name Effective Dates    HW Benjie Motta, SARAH 02/08/24 -     EP Sonny Rivero RN 01/22/24 -     EC Ashley Martino OTR/L 10/13/23 -                      Occupational Therapy Education       Title: PT OT SLP Therapies (Done)       Topic: Occupational Therapy (Done)       Point: ADL training (Done)       Description:   Instruct learner(s) on proper safety adaptation and remediation techniques during self care or transfers.   Instruct in proper use of assistive devices.                  Learning Progress Summary             Patient Acceptance, E, VU by EC at 7/5/2024 1516    Acceptance, E, VU,DU by KS at 7/4/2024 1722    Acceptance, E, VU by EC at 7/4/2024 1540    Acceptance, E, VU,NR by LS at 6/28/2024 1110    Acceptance, E, VU by KS at 6/20/2024 1715    Acceptance, E, VU by EC at 6/20/2024 1558    Acceptance, E, VU by EC at 6/17/2024 1525   Caregiver Acceptance, E, VU,DU by KS at 7/4/2024 1722    Acceptance, E, VU by EC at 6/20/2024 1558                         Point: Home exercise program (Done)       Description:   Instruct learner(s) on appropriate technique for monitoring, assisting and/or progressing therapeutic exercises/activities.                  Learning Progress Summary             Patient Acceptance, E, VU by EC at 7/5/2024 1516    Acceptance, E, VU,DU by KS at 7/4/2024 1722    Acceptance, E, VU by EC at 7/4/2024 1540    Acceptance, E, VU by KS at 6/20/2024 1715    Acceptance, E, VU by EC at 6/20/2024 1558   Caregiver Acceptance, E, VU,DU by KS at 7/4/2024 1722    Acceptance, E, VU by EC at 6/20/2024 1558                         Point: Precautions (Done)       Description:   Instruct learner(s) on  prescribed precautions during self-care and functional transfers.                  Learning Progress Summary             Patient Acceptance, E, VU by EC at 7/5/2024 1516    Acceptance, E, VU,DU by KS at 7/4/2024 1722    Acceptance, E, VU by EC at 7/4/2024 1540    Acceptance, E, VU,NR by  at 6/28/2024 1110    Acceptance, E, VU by KS at 6/20/2024 1715    Acceptance, E, VU,NR by  at 6/19/2024 1136    Acceptance, E, VU by EC at 6/17/2024 1525   Caregiver Acceptance, E, VU,DU by KS at 7/4/2024 1722                         Point: Body mechanics (Done)       Description:   Instruct learner(s) on proper positioning and spine alignment during self-care, functional mobility activities and/or exercises.                  Learning Progress Summary             Patient Acceptance, E, VU by EC at 7/5/2024 1516    Acceptance, E, VU,DU by KS at 7/4/2024 1722    Acceptance, E, VU by EC at 7/4/2024 1540    Acceptance, E, VU,NR by  at 6/28/2024 1110    Acceptance, E, VU by KS at 6/20/2024 1715    Acceptance, E, VU by EC at 6/20/2024 1558    Acceptance, E, VU,NR by  at 6/19/2024 1136    Acceptance, E, VU by EC at 6/17/2024 1525   Caregiver Acceptance, E, VU,DU by KS at 7/4/2024 1722    Acceptance, E, VU by EC at 6/20/2024 1558                                         User Key       Initials Effective Dates Name Provider Type Discipline    KS 02/27/23 -  Ewelina Morales RN Registered Nurse Nurse     06/20/22 -  Bianca Mcgarry, OTR/L Occupational Therapist OT    EC 10/13/23 -  Ashley Martino OTR/L Occupational Therapist OT                      OT Recommendation and Plan  Planned Therapy Interventions (OT): activity tolerance training, adaptive equipment training, BADL retraining, functional balance retraining, occupation/activity based interventions, patient/caregiver education/training, ROM/therapeutic exercise, strengthening exercise, transfer/mobility retraining  Therapy Frequency (OT): 5 times/wk  Plan of Care Review  Plan  of Care Reviewed With: patient  Progress: improving  Outcome Evaluation: OT tx completed. Pt in fowlers & fatigued, but agreeable to therapy tx in bed. Pt completed 3 x 10 reps BUE strengthening utilizing 2 lb DBs to maxize strength needed for fxnal transfers & ADLs. RB in btwn each set. After ther ex, pt was able to roll L & R with Jose R, required maxA for brief change & john hygiene. Excoriation noted surrounding groin area, RN aware. Cont OT POC. She would benefit from cont rehab at d/c  Plan of Care Reviewed With: patient  Outcome Evaluation: OT tx completed. Pt in fowlers & fatigued, but agreeable to therapy tx in bed. Pt completed 3 x 10 reps BUE strengthening utilizing 2 lb DBs to maxize strength needed for fxnal transfers & ADLs. RB in btwn each set. After ther ex, pt was able to roll L & R with Jose R, required maxA for brief change & john hygiene. Excoriation noted surrounding groin area, RN aware. Cont OT POC. She would benefit from cont rehab at d/c     Outcome Measures       Row Name 07/05/24 1500 07/05/24 0957 07/04/24 1200       How much help from another person do you currently need...    Turning from your back to your side while in flat bed without using bedrails? -- 3  -KINGS --    Moving from lying on back to sitting on the side of a flat bed without bedrails? -- 3  -KINGS --    Moving to and from a bed to a chair (including a wheelchair)? -- 1  -KINGS --    Standing up from a chair using your arms (e.g., wheelchair, bedside chair)? -- 2  -KINGS --    Climbing 3-5 steps with a railing? -- 1  -KINGS --    To walk in hospital room? -- 1  -KINGS --    AM-PAC 6 Clicks Score (PT) -- 11  -KINGS --    Highest Level of Mobility Goal -- 4 --> Transfer to chair/commode  -KINGS --       How much help from another is currently needed...    Putting on and taking off regular lower body clothing? 1  -EC -- 1  -EC    Bathing (including washing, rinsing, and drying) 2  -EC -- 2  -EC    Toileting (which includes using toilet bed pan or  urinal) 2  -EC -- 2  -EC    Putting on and taking off regular upper body clothing 3  -EC -- 3  -EC    Taking care of personal grooming (such as brushing teeth) 3  -EC -- 3  -EC    Eating meals 4  -EC -- 4  -EC    AM-PAC 6 Clicks Score (OT) 15  -EC -- 15  -EC       Functional Assessment    Outcome Measure Options AM-PAC 6 Clicks Daily Activity (OT)  -EC AM-PAC 6 Clicks Basic Mobility (PT)  -KINGS AM-PAC 6 Clicks Daily Activity (OT)  -EC              User Key  (r) = Recorded By, (t) = Taken By, (c) = Cosigned By      Initials Name Provider Type    KINGS Garcia Francis, PTA Physical Therapist Assistant    EC Ashley Martino, OTR/L Occupational Therapist                    Time Calculation:    Time Calculation- OT       Row Name 07/05/24 1442             Time Calculation- OT    OT Start Time 1415  -EC      OT Stop Time 1442  -EC      OT Time Calculation (min) 27 min  -EC      Total Timed Code Minutes- OT 27 minute(s)  -EC      OT Received On 07/05/24  -EC         Timed Charges    09440 - OT Therapeutic Exercise Minutes 12  -EC      94998 - OT Self Care/Mgmt Minutes 15  -EC         Total Minutes    Timed Charges Total Minutes 27  -EC       Total Minutes 27  -EC                User Key  (r) = Recorded By, (t) = Taken By, (c) = Cosigned By      Initials Name Provider Type    EC Ashley Martino, OTR/L Occupational Therapist                  Therapy Charges for Today       Code Description Service Date Service Provider Modifiers Qty    40426221336 HC OT RE-EVAL 2 7/4/2024 Ashley Martino, OTR/L GO 1    60095110633 HC OT THER PROC EA 15 MIN 7/5/2024 Ashley Martino, OTR/L GO 1    76990183018 HC OT SELF CARE/MGMT/TRAIN EA 15 MIN 7/5/2024 Ashley Martino, OTR/L GO 1                 Ashley Martino, OTR/L  7/5/2024

## 2024-07-05 NOTE — PLAN OF CARE
Goal Outcome Evaluation:  Plan of Care Reviewed With: patient        Progress: improving  Outcome Evaluation: Pt was in bed, eager to work with therapy.  Able to transfer supine to sitting with min assist.  Transfered sit to stand with mod assist and HHA.  Focused on maintaining standing with HHA and min assist, able to hold a few seconds each time.  Pt c/o increase pain on abdomend from sx.  Will continue to work with pt to increase strength and progress with transfers as pt is able.

## 2024-07-05 NOTE — CASE MANAGEMENT/SOCIAL WORK
Continued Stay Note   [facility]     Patient Name: Tawny Shin  MRN: [id number]  Today's Date: 7/5/2024    Admit Date: 6/12/2024    Plan: Psychiatric   Discharge Plan       Row Name 07/05/24 1420       Plan    [facility]                   Discharge Codes    No documentation.                 Expected Discharge Date and Time       Expected Discharge Date Expected Discharge Time    Jul 2, 2024               OSIEL Pizano

## 2024-07-06 PROCEDURE — 97530 THERAPEUTIC ACTIVITIES: CPT

## 2024-07-06 PROCEDURE — 97110 THERAPEUTIC EXERCISES: CPT

## 2024-07-06 PROCEDURE — 63710000001 PREDNISONE PER 5 MG: Performed by: FAMILY MEDICINE

## 2024-07-06 RX ORDER — PREDNISONE 5 MG/1
5 TABLET ORAL
Status: DISCONTINUED | OUTPATIENT
Start: 2024-07-06 | End: 2024-07-08 | Stop reason: HOSPADM

## 2024-07-06 RX ADMIN — TRAMADOL HYDROCHLORIDE 50 MG: 50 TABLET ORAL at 06:00

## 2024-07-06 RX ADMIN — FERROUS SULFATE TAB 325 MG (65 MG ELEMENTAL FE) 325 MG: 325 (65 FE) TAB at 08:20

## 2024-07-06 RX ADMIN — DULOXETINE HYDROCHLORIDE 60 MG: 30 CAPSULE, DELAYED RELEASE ORAL at 08:23

## 2024-07-06 RX ADMIN — SPIRONOLACTONE 25 MG: 25 TABLET ORAL at 08:21

## 2024-07-06 RX ADMIN — OXYCODONE HYDROCHLORIDE AND ACETAMINOPHEN 500 MG: 500 TABLET ORAL at 08:23

## 2024-07-06 RX ADMIN — Medication 1 TABLET: at 08:20

## 2024-07-06 RX ADMIN — PANTOPRAZOLE SODIUM 40 MG: 40 TABLET, DELAYED RELEASE ORAL at 08:20

## 2024-07-06 RX ADMIN — COLCHICINE 0.6 MG: 0.6 TABLET ORAL at 08:21

## 2024-07-06 RX ADMIN — ASPIRIN 81 MG: 81 TABLET, COATED ORAL at 08:20

## 2024-07-06 RX ADMIN — ALLOPURINOL 100 MG: 100 TABLET ORAL at 08:22

## 2024-07-06 RX ADMIN — LOSARTAN POTASSIUM 50 MG: 50 TABLET, FILM COATED ORAL at 08:28

## 2024-07-06 RX ADMIN — FOLIC ACID 1000 MCG: 1 TABLET ORAL at 08:23

## 2024-07-06 RX ADMIN — ISOSORBIDE MONONITRATE 60 MG: 60 TABLET, EXTENDED RELEASE ORAL at 08:21

## 2024-07-06 RX ADMIN — TRAMADOL HYDROCHLORIDE 50 MG: 50 TABLET ORAL at 21:36

## 2024-07-06 RX ADMIN — METHENAMINE HIPPURATE 1 G: 1000 TABLET ORAL at 21:36

## 2024-07-06 RX ADMIN — PREDNISONE 5 MG: 5 TABLET ORAL at 08:22

## 2024-07-06 RX ADMIN — LEFLUNOMIDE 20 MG: 20 TABLET ORAL at 08:21

## 2024-07-06 RX ADMIN — APIXABAN 5 MG: 5 TABLET, FILM COATED ORAL at 08:20

## 2024-07-06 RX ADMIN — Medication 10 ML: at 21:36

## 2024-07-06 RX ADMIN — CARVEDILOL 25 MG: 25 TABLET, FILM COATED ORAL at 08:20

## 2024-07-06 RX ADMIN — METHENAMINE HIPPURATE 1 G: 1000 TABLET ORAL at 08:36

## 2024-07-06 RX ADMIN — APIXABAN 5 MG: 5 TABLET, FILM COATED ORAL at 21:36

## 2024-07-06 RX ADMIN — LEVOTHYROXINE SODIUM 75 MCG: 0.07 TABLET ORAL at 06:00

## 2024-07-06 RX ADMIN — Medication 10 ML: at 10:29

## 2024-07-06 NOTE — PLAN OF CARE
Goal Outcome Evaluation:  Plan of Care Reviewed With: patient        Progress: no change  Outcome Evaluation: A&Ox4. Ultram given for c/o pain with good results. Marked dried drainage on far right dressing on abdomen. Turning. Incontinent. Cream applied to groin and bottom. Dophin mattress. RA. Wound care to rt gluteal. Sitter at bs. Pills whole in applesauce.

## 2024-07-06 NOTE — THERAPY TREATMENT NOTE
Acute Care - Physical Therapy Treatment Note  [facility]     Patient Name: Tawny Shin  : 1955  MRN: [id number]  Today's Date: 2024      Visit Dx:     ICD-10-CM ICD-9-CM   1. Acute UTI (urinary tract infection)  N39.0 599.0   2. Altered mental status, unspecified altered mental status type  R41.82 780.97   3. Open wound of right buttock, sequela  S31.819S 906.0   4. Cyst of buttocks  L72.9 706.2   5. Dysphagia, unspecified type  R13.10 787.20   6. Impaired mobility [Z74.09]  Z74.09 799.89   7. Hydronephrosis with urinary obstruction due to ureteral calculus  N13.2 592.1     591   8. Acute cholecystitis  K81.0 575.0   9. Acute congestive heart failure, unspecified heart failure type  I50.9 428.0     Patient Active Problem List   Diagnosis    T12 compression fracture, initial encounter    Chronic embolism and thrombosis of unspecified deep veins of left lower extremity    Chronic anticoagulation    Iron deficiency anemia    Osteoporosis    E coli bacteremia    Epidural hematoma    Pleural effusion, left    Functional neurological symptom disorder with weakness or paralysis    Class 1 obesity due to excess calories with serious comorbidity and body mass index (BMI) of 33.0 to 33.9 in adult    Rheumatoid arthritis involving multiple sites    Chronic heart failure with preserved ejection fraction    Venous insufficiency    Coronary artery disease involving native coronary artery of native heart without angina pectoris    Sick sinus syndrome    Essential hypertension    Pressure injury of skin of heel    Chronic pain    Anemia of chronic disease    Cholelithiasis    Pressure injury of skin of buttock    Near functional paraplegia    Skin cancer    Squamous cell carcinoma of back    Hematoma    Right-sided chest wall pain    Hyperlipidemia LDL goal <70    Malodorous urine    Stage 3b chronic kidney disease    Cyst of buttocks    Sepsis due to Escherichia coli without acute organ dysfunction    Generalized  weakness    Paralysis    History of urinary retention    Bacteremia due to Enterobacter species    Bacteremia    Hypothyroidism    Abnormal CT of the abdomen    Presence of cardiac pacemaker    Altered mental status    UTI (urinary tract infection)    Esophageal dysphagia    Hydronephrosis with urinary obstruction due to ureteral calculus    Acute cholecystitis    Acute gout     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    Elevated cholesterol     Eustachian tube dysfunction     Heart disease     Herpes simplex     History of transfusion     Hyperlipidemia     Hypertension     Hypothyroidism 12/23/2020    Intrinsic asthma 12/23/2020    Knee dislocation     Labral tear of right hip joint     Laryngitis sicca     Laryngitis, chronic     Left carotid bruit 03/09/2016    MI (myocardial infarction)     Myalgia due to statin 06/25/2019    Open wound of right hip 09/14/2021    Osteomyelitis of right femur 07/06/2021    Otorrhea     Pacemaker 11/17/2016    Primary osteoarthritis of left knee 12/23/2020    Psoriasis vulgaris 12/23/2020    S/P coronary artery stent placement 03/09/2016    Sensorineural hearing loss     Seropositive rheumatoid arthritis of multiple sites 12/27/2019    Overview:  -myochrysine '93-'96 -methotrexate '96--->11/98;r/s  restarted 2/99--> 8/14 (anemia) -sulfasalazine- not effective -penicillamine 6/98-->10/98; no effect -leflunomide 11/98--> - Humira '13-->didn't take - Enbrel  "12/14-->3/15- no effect!   Last Assessment & Plan:  - \"aching all over\" because she had to be off her anti-rheumatic drugs for 2 weeks in preparation for her R knee surgery - he    Sick sinus syndrome 12/27/2019    Sjogren's disease     Spondylolisthesis of lumbar region 01/17/2018    Syncope, recurrent 02/08/2021    Urinary tract infection     UTI (urinary tract infection) 04/19/2024     Past Surgical History:   Procedure Laterality Date    A-V CARDIAC PACEMAKER INSERTION  2016    ATRIAL CARDIAC PACEMAKER INSERTION      CARDIAC CATHETERIZATION      CATARACT EXTRACTION      CERVICAL CORPECTOMY N/A 3/3/2021    Procedure: CERVICAL 6 CORPECTOMY WITH TITANIUM CAGE WITH NEURO MONITORING;  Surgeon: Bandar Shea MD;  Location:  [facility] OR;  Service: Neurosurgery;  Laterality: N/A;    COLONOSCOPY  11/08/2011    One fold in the ascending colon which showed ulcer otherwise normal exam    COLONOSCOPY  11/12/2004    Normal exam repeat in five years    CORONARY ANGIOPLASTY WITH STENT PLACEMENT      X 2; 2013 & 2014    ENDOSCOPY  07/10/2014    Normal exam    ENDOSCOPY N/A 5/30/2024    Procedure: ESOPHAGOGASTRODUODENOSCOPY WITH ANESTHESIA;  Surgeon: Taiwo Sanchez MD;  Location: [facility] ENDOSCOPY;  Service: Gastroenterology;  Laterality: N/A;  preop; dysphagia  postop: balloon dilation  PCP Jesus Manuel jeff    FLAP LEG Right 9/14/2021    Procedure: RIGHT GLUTEAL FASCIOCUTANEOUS ADVANCEMENT FLAP AND RIGHT TENSOR FASCIAL JESSICA FLAP;  Surgeon: Amadeo Turner MD;  Location:  [facility];  Service: Plastics;  Laterality: Right;    HIP ABDUCTION TENOTOMY BILATERAL Right 1/14/2021    Procedure: RIGHT HIP GLUTEUS MEDLUS / MINIMUS REPAIR, POSSIBLE ACHILLES ALLOGRAFT;  Surgeon: Nino Carlson MD;  Location:  [facility];  Service: Orthopedics;  Laterality: Right;    INCISION AND DRAINAGE ABSCESS Right 6/4/2022    Procedure: INCISION AND DRAINAGE ABSCESS right hip;  Surgeon: Magda Salcido MD;  Location:  [facility];  Service: General;  " Laterality: Right;    INCISION AND DRAINAGE ABSCESS Right 6/10/2022    Procedure: RIGHT HIP INCISION AND DRAINAGE. MD NEEDS 3L VANC IRRIGATION, CURRETTES, DAICANS, KERLEX ROLLS;  Surgeon: Amadeo Turner MD;  Location:  [facility];  Service: Plastics;  Laterality: Right;    INCISION AND DRAINAGE HIP Right 2/9/2021    Procedure: HIP INCISION AND DRAINAGE;  Surgeon: Nino Carlson MD;  Location:  [facility];  Service: Orthopedics;  Laterality: Right;    INCISION AND DRAINAGE LEG Right 10/24/2021    Procedure: INCISION AND DRAINAGE LOWER EXTREMITY;  Surgeon: Amadeo Turner MD;  Location:  [facility];  Service: Plastics;  Laterality: Right;    INCISION AND DRAINAGE OF WOUND Right 7/8/2021    Procedure: INCISION AND DRAINAGE WOUND RIGHT HIP;  Surgeon: James Huntley MD;  Location:  [facility];  Service: Orthopedics;  Laterality: Right;    JOINT REPLACEMENT      KYPHOPLASTY WITH BIOPSY Bilateral 10/26/2021    Procedure: THOARCIC 12 KYPHOPLASTY WITH BIOPSY;  Surgeon: Bandar Shea MD;  Location:  [facility];  Service: Neurosurgery;  Laterality: Bilateral;    LEG DEBRIDEMENT Right 9/14/2021    Procedure: DEBRIDEMENT OF RIGHT HIP WOUND, RIGHT GLUTEAL FASCIOCUTANEOUS ADVANCEMENT FLAP AND RIGHT TENSOR FASCIAL JESSICA FLAP;  Surgeon: Amadeo Turner MD;  Location:  [facility];  Service: Plastics;  Laterality: Right;    LUMBAR DISCECTOMY Right 3/23/2021    Procedure: LUMBAR DISCECTOMY MICRO, Lumbar 1/2 right;  Surgeon: Bandar Shea MD;  Location:  [facility];  Service: Neurosurgery;  Laterality: Right;    LUMBAR FUSION N/A 1/19/2018    Procedure: L3-4,L4-5 DECOMPRESSION, POSTERIOR SPINAL FUSION WITH INSTRUMENTATION;  Surgeon: Fortino Oropeza MD;  Location:  [facility];  Service:     LUMBAR LAMINECTOMY WITH FUSION Left 1/17/2018    Procedure: LEFT L3-4 L4-5 LATERAL LUMBAR INTERBODY FUSION;  Surgeon: Fortino Oropeza MD;  Location:  [facility];  Service:     MASS EXCISION Right 4/23/2024    Procedure: RIGHT  BUTTOCK MASS EXCISION;  Surgeon: Moises Keyes MD;  Location:  [facility];  Service: General;  Laterality: Right;    MYRINGOTOMY W/ TUBES  09/04/2014    TUBES NO LONGER IN PLACE    OTHER SURGICAL HISTORY      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 so hardware was removed and spacers were placed    REPLACEMENT TOTAL KNEE Right     URETEROSCOPY LASER LITHOTRIPSY WITH STENT INSERTION N/A 6/21/2024    Procedure: URETEROSCOPY LASER LITHOTRIPSY WITH STENT INSERTION LEFT;  Surgeon: Ky Plascencia MD;  Location:  [facility];  Service: Urology;  Laterality: N/A;     PT Assessment (Last 12 Hours)       PT Evaluation and Treatment       Row Name 07/06/24 1506 07/06/24 1315       Physical Therapy Time and Intention    Subjective Information complains of;weakness;fatigue  - --    Document Type therapy note (daily note)  - therapy note (daily note)  -    Mode of Treatment physical therapy  - physical therapy  -    Comment, Session Not Performed -- pt still eating lunch, asked to check back  -[facility] Name 07/06/24 1506          General Information    Existing Precautions/Restrictions fall  -[facility] Name 07/06/24 1506          Pain    Pretreatment Pain Rating 0/10 - no pain  -     Posttreatment Pain Rating 0/10 - no pain  -[facility] Name 07/06/24 1506          Bed Mobility    Supine-Sit Maries (Bed Mobility) verbal cues;minimum assist (75% patient effort);moderate assist (50% patient effort)  -     Sit-Supine Maries (Bed Mobility) verbal cues;minimum assist (75% patient effort)  -[facility] Name 07/06/24 1506          Transfers    Comment, (Transfers) stood x 3, unable to stand with RWX, stood in front of pt with HHA  -[facility] Name 07/06/24 1506          Sit-Stand Transfer    Sit-Stand Maries (Transfers) verbal cues;moderate assist (50% patient effort)  -[facility] Name 07/06/24 1506          Stand-Sit Transfer    Stand-Sit Maries (Transfers) verbal cues;minimum assist (75% patient  effort)  -KINGS       Row Name 07/06/24 1506          Motor Skills    Comments, Therapeutic Exercise in fowlers, AROM/AAROM BLE X 20 , sitting AROM BLE x 10  -KINGS     Additional Documentation Comments, Therapeutic Exercise (Row)  -KINGS       Row Name             Wound 04/23/24 0936 Right gluteal Incision    Wound - Properties Group Placement Date: 04/23/24  -EP Placement Time: 0936  -EP Present on Original Admission: N  -EP Side: Right  -EP Location: gluteal  -EP Primary Wound Type: Incision  -EP    Retired Wound - Properties Group Placement Date: 04/23/24  -EP Placement Time: 0936  -EP Present on Original Admission: N  -EP Side: Right  -EP Location: gluteal  -EP Primary Wound Type: Incision  -EP    Retired Wound - Properties Group Date first assessed: 04/23/24  -EP Time first assessed: 0936  -EP Present on Original Admission: N  -EP Side: Right  -EP Location: gluteal  -EP Primary Wound Type: Incision  -EP      Row Name             Wound 07/03/24 0947 abdomen Incision    Wound - Properties Group Placement Date: 07/03/24  -HW Placement Time: 0947 -HW Location: abdomen  -HW Primary Wound Type: Incision  -HW    Retired Wound - Properties Group Placement Date: 07/03/24  -HW Placement Time: 0947 -HW Location: abdomen  -HW Primary Wound Type: Incision  -HW    Retired Wound - Properties Group Date first assessed: 07/03/24  -HW Time first assessed: 0947 -HW Location: abdomen  -HW Primary Wound Type: Incision  -HW      Row Name 07/06/24 1506          Positioning and Restraints    Pre-Treatment Position in bed  -KINGS     Post Treatment Position bed  -KINGS     In Bed fowlers;call light within reach;encouraged to call for assist;side rails up x2  -KINGS               User Key  (r) = Recorded By, (t) = Taken By, (c) = Cosigned By      Initials Name Provider Type    Garcia Jaramillo PTA Physical Therapist Assistant    Benjie Melendez, RN Registered Nurse    Sonny Thompson, RN Registered Nurse                    Physical Therapy  Education       Title: PT OT SLP Therapies (Done)       Topic: Physical Therapy (Done)       Point: Mobility training (Done)       Learning Progress Summary             Patient Acceptance, E, VU,DU by KS at 7/4/2024 1722    Acceptance, E, VU by KR at 7/4/2024 1208    Acceptance, E, VU by KS at 6/20/2024 1715    Acceptance, E, VU by MS at 6/18/2024 1502    Comment: role of PT in her care   Caregiver Acceptance, E, VU,DU by KS at 7/4/2024 1722                         Point: Home exercise program (Done)       Learning Progress Summary             Patient Acceptance, E, VU,DU by KS at 7/4/2024 1722    Acceptance, E, VU by KS at 6/20/2024 1715    Acceptance, E,D,TB, VU,DU,NR by  at 6/15/2024 1514    Acceptance, E,TB,D, VU,DU,NR by  at 6/14/2024 1550   Caregiver Acceptance, E, VU,DU by KS at 7/4/2024 1722                         Point: Body mechanics (Done)       Learning Progress Summary             Patient Acceptance, E, VU,DU by KS at 7/4/2024 1722    Acceptance, E, VU by KS at 6/20/2024 1715    Acceptance, E,D,TB, VU,DU,NR by  at 6/15/2024 1514    Acceptance, E,TB,D, VU,DU,NR by  at 6/14/2024 1550   Caregiver Acceptance, E, VU,DU by KS at 7/4/2024 1722                         Point: Precautions (Done)       Learning Progress Summary             Patient Acceptance, E, VU,DU by KS at 7/4/2024 1722    Acceptance, E, VU by HERB at 7/4/2024 1208    Acceptance, E, VU by KS at 6/20/2024 1715    Acceptance, E,D,TB, VU,DU,NR by  at 6/15/2024 1514    Acceptance, E,TB,D, VU,DU,NR by  at 6/14/2024 1550   Caregiver Acceptance, E, VU,DU by KS at 7/4/2024 1722                                         User Key       Initials Effective Dates Name Provider Type Discipline    HERB 06/03/24 -  Yue Hale PT DPT Physical Therapist PT    MS 07/11/23 -  Nicole Olson, PT, DPT, NCS Physical Therapist PT    KS 02/27/23 -  Ewelina Morales, RN Registered Nurse Nurse     10/17/23 -  Stephanie oDll, SARAH Registered Nurse  Nurse                  PT Recommendation and Plan     Plan of Care Reviewed With: patient  Progress: improving  Outcome Evaluation: Pt was in bed, agreed to therapy.  Performed LE exercises in the bed, instructed pt on performing them throughout the day.  Transfered supine to sitting with min/mod assist.  Sitting EOB, able to maintain balance with CGA.  Transfered sit to stand with mod assist and HHA.  Performed x 3.  Pt was able to scoot her feet slightly to the left.  Will continue to work with pt to increase strength and progress with transfers as pt is able.   Outcome Measures       Row Name 07/06/24 1506 07/05/24 1500 07/05/24 0957       How much help from another person do you currently need...    Turning from your back to your side while in flat bed without using bedrails? 3  -KINGS -- 3  -KINGS    Moving from lying on back to sitting on the side of a flat bed without bedrails? 3  -KINGS -- 3  -KINGS    Moving to and from a bed to a chair (including a wheelchair)? 1  -KINGS -- 1  -KINGS    Standing up from a chair using your arms (e.g., wheelchair, bedside chair)? 2  -KINGS -- 2  -KINGS    Climbing 3-5 steps with a railing? 1  -KINGS -- 1  -KINGS    To walk in hospital room? 1  -KINGS -- 1  -KINGS    AM-PAC 6 Clicks Score (PT) 11  -KINGS -- 11  -KINGS    Highest Level of Mobility Goal 4 --> Transfer to chair/commode  -KINGS -- 4 --> Transfer to chair/commode  -KINGS       How much help from another is currently needed...    Putting on and taking off regular lower body clothing? -- 1  -EC --    Bathing (including washing, rinsing, and drying) -- 2  -EC --    Toileting (which includes using toilet bed pan or urinal) -- 2  -EC --    Putting on and taking off regular upper body clothing -- 3  -EC --    Taking care of personal grooming (such as brushing teeth) -- 3  -EC --    Eating meals -- 4  -EC --    AM-PAC 6 Clicks Score (OT) -- 15  -EC --       Functional Assessment    Outcome Measure Options AM-PAC 6 Clicks Basic Mobility (PT)  -KINGS AM-PAC 6 Clicks Daily  Activity (OT)  -EC AM-PAC 6 Clicks Basic Mobility (PT)  -KIGNS      Row Name 07/04/24 1200             How much help from another is currently needed...    Putting on and taking off regular lower body clothing? 1  -EC      Bathing (including washing, rinsing, and drying) 2  -EC      Toileting (which includes using toilet bed pan or urinal) 2  -EC      Putting on and taking off regular upper body clothing 3  -EC      Taking care of personal grooming (such as brushing teeth) 3  -EC      Eating meals 4  -EC      AM-PAC 6 Clicks Score (OT) 15  -EC         Functional Assessment    Outcome Measure Options AM-PAC 6 Clicks Daily Activity (OT)  -EC                User Key  (r) = Recorded By, (t) = Taken By, (c) = Cosigned By      Initials Name Provider Type    Garcia Jaramillo PTA Physical Therapist Assistant    Ashley Nash, OTR/L Occupational Therapist                     Time Calculation:    PT Charges       Row Name 07/06/24 1506             Time Calculation    Start Time 1506  -KINGS      Stop Time 1546  -KINGS      Time Calculation (min) 40 min  -KINGS      PT Received On 07/06/24  -KINGS         Time Calculation- PT    Total Timed Code Minutes- PT 40 minute(s)  -KINGS         Timed Charges    87033 - PT Therapeutic Exercise Minutes 16  -KINGS      60100 - PT Therapeutic Activity Minutes 24  -KINGS         Total Minutes    Timed Charges Total Minutes 40  -KINGS       Total Minutes 40  -IKNGS                User Key  (r) = Recorded By, (t) = Taken By, (c) = Cosigned By      Initials Name Provider Type    Garcia Jaramillo PTA Physical Therapist Assistant                  Therapy Charges for Today       Code Description Service Date Service Provider Modifiers Qty    26799965276 HC PT THERAPEUTIC ACT EA 15 MIN 7/5/2024 Garcia Francis, PTA GP 1    00681708193 HC PT THER PROC EA 15 MIN 7/5/2024 Garcia Francis, PTA GP 1    52666560248 HC PT THERAPEUTIC ACT EA 15 MIN 7/6/2024 Garcia Francis, PTA GP 2    80517561411 HC PT THER PROC  EA 15 MIN 7/6/2024 Garcia Francis, PTA GP 1            PT G-Codes  Outcome Measure Options: AM-PAC 6 Clicks Basic Mobility (PT)  AM-PAC 6 Clicks Score (PT): 11  AM-PAC 6 Clicks Score (OT): 15    Garcia Francis PTA  7/6/2024

## 2024-07-06 NOTE — PROGRESS NOTES
[facility] Medicine Services  INPATIENT PROGRESS NOTE    Patient Name: Tawny Shin  Date of Admission: 6/12/2024  Today's Date: 07/06/24  Length of Stay: 15  Primary Care Physician: Moises Oliveira MD    Subjective   Chief Complaint: Weakness.  Abdomen pain with cough.  HPI   Patient complained that she was woke her every 2 hours to be change, discussed with nursing staff to cut back to every 4 hours at night.  Blood pressure stable, afebrile.  Cut back prednisone to home dose today.    Review of Systems   Constitutional:  Positive for activity change, appetite change and fatigue. Negative for chills and fever.   HENT:  Negative for hearing loss, nosebleeds, tinnitus and trouble swallowing.    Eyes:  Negative for visual disturbance.   Respiratory:  Negative for cough, chest tightness, shortness of breath and wheezing.    Cardiovascular:  Negative for chest pain, palpitations and leg swelling.   Gastrointestinal: Right upper abdomen pain from surgery.  Endocrine: Negative for cold intolerance, heat intolerance, polydipsia, polyphagia and polyuria.   Genitourinary:  Negative for decreased urine volume, difficulty urinating, dysuria, flank pain, frequency and hematuria.   Musculoskeletal:  Positive for arthralgias, gait problem and myalgias. Negative for joint swelling.   Skin:  Negative for rash.   Allergic/Immunologic: Negative for immunocompromised state.   Neurological:  Positive for weakness. Negative for dizziness, syncope, light-headedness and headaches.   Hematological:  Negative for adenopathy. Does not bruise/bleed easily.  All pertinent negatives and positives are as above. All other systems have been reviewed and are negative unless otherwise stated.     Objective    Temp:  [98 °F (36.7 °C)-98.4 °F (36.9 °C)] 98.4 °F (36.9 °C)  Heart Rate:  [71-79] 72  Resp:  [16-18] 16  BP: (121-168)/(46-79) 121/46  Physical Exam  Vitals and nursing note reviewed.    Constitutional:       Comments: Chronically ill.  Deconditioning.   HENT:      Head: Normocephalic.   Eyes:      Conjunctiva/sclera: Conjunctivae normal.      Pupils: Pupils are equal, round, and reactive to light.   Cardiovascular:      Rate and Rhythm: Normal rate and regular rhythm.      Heart sounds: Normal heart sounds.   Pulmonary:      Effort: Pulmonary effort is normal. No respiratory distress.      Breath sounds: Normal breath sounds.   Abdominal:      General: Bowel sounds are normal. There is no distension.      Palpations: Abdomen is soft.  Right upper abdomen pain, from surgery.     Comments: Obesity.     Musculoskeletal:         General: No swelling.      Cervical back: Neck supple.   Skin:     General: Skin is warm and dry.      Capillary Refill: Capillary refill takes 2 to 3 seconds.      Findings: No rash.   Neurological:      Mental Status: She is alert and oriented to person, place, and time.      Motor: Weakness present.      Coordination: Coordination abnormal.      Gait: Gait abnormal.   Psychiatric:         Mood and Affect: Mood normal.         Behavior: Behavior normal.         Thought Content: Thought content normal.       Results Review:  I have reviewed the labs, radiology results, and diagnostic studies.    Laboratory Data:   Results from last 7 days   Lab Units 07/05/24  0600 07/04/24  0603 07/01/24  0424   WBC 10*3/mm3 5.98 4.81 5.42   HEMOGLOBIN g/dL 7.9* 7.5* 8.5*   HEMATOCRIT % 26.7* 25.3* 29.1*   PLATELETS 10*3/mm3 228 206 194        Results from last 7 days   Lab Units 07/04/24  0603 07/01/24  0424 06/30/24  0552   SODIUM mmol/L 136 136 139   POTASSIUM mmol/L 5.0 4.3 3.8   CHLORIDE mmol/L 103 102 105   CO2 mmol/L 24.0 23.0 26.0   BUN mg/dL 19 15 12   CREATININE mg/dL 0.89 0.91 0.87   CALCIUM mg/dL 8.9 9.0 8.6   BILIRUBIN mg/dL 0.2  --   --    ALK PHOS U/L 82  --   --    ALT (SGPT) U/L 26  --   --    AST (SGOT) U/L 43*  --   --    GLUCOSE mg/dL 122* 123* 92       Culture Data:  "  No results found for: \"BLOODCX\", \"URINECX\", \"WOUNDCX\", \"MRSACX\", \"RESPCX\", \"STOOLCX\"    Radiology Data:   Imaging Results (Last 24 Hours)       ** No results found for the last 24 hours. **            I have reviewed the patient's current medications.     Assessment/Plan   Assessment  Active Hospital Problems    Diagnosis     **Altered mental status     Acute gout     Esophageal dysphagia     UTI (urinary tract infection)     Hydronephrosis with urinary obstruction due to ureteral calculus     Acute cholecystitis     Hypothyroidism     Stage 3b chronic kidney disease     Near functional paraplegia     Cholelithiasis     Essential hypertension     Sick sinus syndrome     Coronary artery disease involving native coronary artery of native heart without angina pectoris     Venous insufficiency     Rheumatoid arthritis involving multiple sites     Chronic heart failure with preserved ejection fraction     Class 1 obesity due to excess calories with serious comorbidity and body mass index (BMI) of 33.0 to 33.9 in adult     Functional neurological symptom disorder with weakness or paralysis     Chronic anticoagulation     Chronic embolism and thrombosis of unspecified deep veins of left lower extremity        Treatment Plan  Gallstone, within the gallbladder neck/dysfunctional gallbladder.  Mild distended gallbladder.  Right upper quadrant tenderness.  Ultrasound of the gallbladder-Cholelithiasis with small shadowing stones and sludge within the region of the gallbladder neck- Moderate distention of the gallbladder- however no gallbladder wall thickening or pericholecystic fluid identified- No biliary dilatation.  HIDA scan-Nonvisualization of the gallbladder on images obtained up to 90 minutes- Cystic duct obstruction and acute cholecystitis cannot be excluded on the basis of this exam,  No evidence of common bile duct obstruction with emptying of activity into the C-loop of the duodenum and proximal jejunum, Gallstones " and sludge were demonstrated on recent ultrasound- combined with nonvisualization of the gallbladder exclude performance of an ejection fraction.  Status post cholecystectomy.     Hypertension/sick sinus syndrome/venous insufficiency/CHF.  Start back on Eliquis today.  Aspirin . Coreg . Lasix.  Imdur.  Cozaar . Aldactone.  Nitro as needed.     Recurrent UTI/neurogenic bladder.  Patient finished cefepime/Flagyl antibiotics.  Continue HipRex.  Repeated UA, negative for bacteria.  Consult ID.  Urology consulted.  CT scan abdomen pelvic . Obstructive uropathy on the left with mild to moderate dilatation of the upper tracts of the left kidney and left ureter into the true pelvis where there is a 5 x 6 mm calculus within the left ureter approximately 4 to 5 cm above the UVJ- right ureter is decompressed and normal in appearance- No evidence of renal mass. No perinephric fluid collection present, gallbladder is mildly distended- gallstones within the gallbladder neck,  No pericholecystic inflammatory changes or biliary dilatation, Constipation,  No obstruction or free air, Normal appendix, Calcified fibroid within the lower uterine segment, Mild constipation, No obstruction or free air, Normal appendix, Small fat-containing periumbilical hernia, Previous kyphoplasty at T12, Previous fusion at L3-L4 and L4-L5, Left basilar atelectasis..  Recommendation from urology-evaluation of neurogenic bladder at a university setting like [place].     Obstructing renal calculi left side/moderate dilated of left ureter.  Callus 5 x 6 mm and left 4 to 5 cm above the UVJ junction.  Status post 6/21/2024 urethroscope laser lithotripsy with stent insertion left, remove stent and Horan cath 6/29/2024.     Gout . New problem RLE pain with elevated uric acid level, continue colchicine.  Continue allopurinol.     Reflux/esophageal dysphagia. Dysphagia recent esophageal stretching, GI evaluated and recommended to continue outpatient follow-up.   SLP evaluated and recommended to continue regular diet.  Protonix . Zofran as needed.     Altered mental status/encephalopathy.  Back to baseline.  CT scan the head-No acute intracranial abnormality, Chronic sinusitis and left mastoid effusion.      Hypoxia.  Resolved.  Chest x-ray-Stable chest exam without acute process, No visualized infiltrate.   Patient is on room air.     Rheumatoid arthritis.  Rheumatoid factor quantitative normal.  Arava.  Wean down prednisone to home dose.  Doppler right lower extremity and NISREEN normal.     Hypothyroidism.  Synthroid.     Chronic stage IIIb renal failure.  Creatinine normalized.  Hold Lasix.      Hypomagnesia. Resolved.     Hyponatremia.  Resolved.     Hypokalemia.  Resolved.  Magnesium normal.     Right gluteal wound.  Consult wound care.     Rheumatoid arthritis. Arava.     Depression/anxiety.  Cymbalta.     Postmenopausal.  Estradiol cream.     Anemia.  Hemoglobin decreased.  Iron sulfate.  Folic acid.  No sign of acute bleed.     Pain . lidocaine patch.  Voltaren gel.  Ultram as needed.     Chronic DVT.  Back on Eliquis.     Functional neurological symptom disorder with paraplegia.     Nutrition.  Cardiac diet.  Vitamin C.  Multivitamins.  Regular/house diet.  Boost supplement.     Deconditioning.  PT and OT consult.  Patient use a walker and bedbound at baseline.     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  Last urine culture shows no growth.  Blood culture-no growth for 5 days.     Dr. Shin's mom.  Patient already have home health at home.  Patient request for lift at discharge.  Patient wants to go to [facility] rehab.  Consult .     Medical Decision Making  Number and Complexity of problems: Recurrent UTI/altered mental status/reflux/rheumatoid arthritis/chronic 3B renal failure  Differential Diagnosis: None     Conditions and Status        Condition is unchanged.     [facility] Data  External  documents reviewed: Previous note .  Cardiac tracing (EKG, telemetry) interpretation: Sinus.  Radiology interpretation: X-ray/CT scan of the head  Labs reviewed: Laboratory  Any tests that were considered but not ordered: Lab in a.m.     Decision rules/scores evaluated (example [id number], Wells, etc): None     Discussed with: Patient and family     Care Planning  Shared decision making: Patient and family  Code status and discussions: DNR     Disposition  Social Determinants of Health that impact treatment or disposition: From home  Plan for rehab.    Electronically signed by Saulo Ambriz MD, 07/06/24, 13:40 CDT.

## 2024-07-06 NOTE — PLAN OF CARE
Goal Outcome Evaluation: Pt is alert and oriented, no c/o pain, no c/o n/v, vitals are stable, room air, IV is clean dry and intact, friends and family at bedside           Progress: improving

## 2024-07-07 LAB
ANION GAP SERPL CALCULATED.3IONS-SCNC: 7 MMOL/L (ref 5–15)
BUN SERPL-MCNC: 17 MG/DL (ref 8–23)
BUN/CREAT SERPL: 22.1 (ref 7–25)
CALCIUM SPEC-SCNC: 8.7 MG/DL (ref 8.6–10.5)
CHLORIDE SERPL-SCNC: 103 MMOL/L (ref 98–107)
CO2 SERPL-SCNC: 27 MMOL/L (ref 22–29)
CREAT SERPL-MCNC: 0.77 MG/DL (ref 0.57–1)
DEPRECATED RDW RBC AUTO: 69.1 FL (ref 37–54)
EGFRCR SERPLBLD CKD-EPI 2021: 83.1 ML/MIN/1.73
ERYTHROCYTE [DISTWIDTH] IN BLOOD BY AUTOMATED COUNT: 20.4 % (ref 12.3–15.4)
GLUCOSE SERPL-MCNC: 77 MG/DL (ref 65–99)
HCT VFR BLD AUTO: 27.4 % (ref 34–46.6)
HGB BLD-MCNC: 8 G/DL (ref 12–15.9)
MCH RBC QN AUTO: 28.1 PG (ref 26.6–33)
MCHC RBC AUTO-ENTMCNC: 29.2 G/DL (ref 31.5–35.7)
MCV RBC AUTO: 96.1 FL (ref 79–97)
PLATELET # BLD AUTO: 236 10*3/MM3 (ref 140–450)
PMV BLD AUTO: 11.2 FL (ref 6–12)
POTASSIUM SERPL-SCNC: 4.8 MMOL/L (ref 3.5–5.2)
RBC # BLD AUTO: 2.85 10*6/MM3 (ref 3.77–5.28)
SODIUM SERPL-SCNC: 137 MMOL/L (ref 136–145)
WBC NRBC COR # BLD AUTO: 5.48 10*3/MM3 (ref 3.4–10.8)

## 2024-07-07 PROCEDURE — 85027 COMPLETE CBC AUTOMATED: CPT | Performed by: FAMILY MEDICINE

## 2024-07-07 PROCEDURE — 63710000001 PREDNISONE PER 5 MG: Performed by: FAMILY MEDICINE

## 2024-07-07 PROCEDURE — 80048 BASIC METABOLIC PNL TOTAL CA: CPT | Performed by: FAMILY MEDICINE

## 2024-07-07 RX ADMIN — ALLOPURINOL 100 MG: 100 TABLET ORAL at 08:51

## 2024-07-07 RX ADMIN — PREDNISONE 5 MG: 5 TABLET ORAL at 08:51

## 2024-07-07 RX ADMIN — ASPIRIN 81 MG: 81 TABLET, COATED ORAL at 08:51

## 2024-07-07 RX ADMIN — DULOXETINE HYDROCHLORIDE 60 MG: 30 CAPSULE, DELAYED RELEASE ORAL at 08:51

## 2024-07-07 RX ADMIN — METHENAMINE HIPPURATE 1 G: 1000 TABLET ORAL at 21:46

## 2024-07-07 RX ADMIN — PANTOPRAZOLE SODIUM 40 MG: 40 TABLET, DELAYED RELEASE ORAL at 08:51

## 2024-07-07 RX ADMIN — CARVEDILOL 25 MG: 25 TABLET, FILM COATED ORAL at 17:31

## 2024-07-07 RX ADMIN — APIXABAN 5 MG: 5 TABLET, FILM COATED ORAL at 21:46

## 2024-07-07 RX ADMIN — OXYCODONE HYDROCHLORIDE AND ACETAMINOPHEN 500 MG: 500 TABLET ORAL at 08:51

## 2024-07-07 RX ADMIN — METHENAMINE HIPPURATE 1 G: 1000 TABLET ORAL at 08:52

## 2024-07-07 RX ADMIN — CARVEDILOL 25 MG: 25 TABLET, FILM COATED ORAL at 08:51

## 2024-07-07 RX ADMIN — Medication 10 ML: at 08:52

## 2024-07-07 RX ADMIN — APIXABAN 5 MG: 5 TABLET, FILM COATED ORAL at 08:51

## 2024-07-07 RX ADMIN — Medication 1 TABLET: at 08:51

## 2024-07-07 RX ADMIN — LEVOTHYROXINE SODIUM 75 MCG: 0.07 TABLET ORAL at 06:19

## 2024-07-07 RX ADMIN — TRAMADOL HYDROCHLORIDE 50 MG: 50 TABLET ORAL at 21:43

## 2024-07-07 RX ADMIN — LEFLUNOMIDE 20 MG: 20 TABLET ORAL at 08:51

## 2024-07-07 RX ADMIN — COLCHICINE 0.6 MG: 0.6 TABLET ORAL at 08:51

## 2024-07-07 RX ADMIN — SPIRONOLACTONE 25 MG: 25 TABLET ORAL at 08:51

## 2024-07-07 RX ADMIN — FOLIC ACID 1000 MCG: 1 TABLET ORAL at 08:51

## 2024-07-07 RX ADMIN — LOSARTAN POTASSIUM 50 MG: 50 TABLET, FILM COATED ORAL at 08:51

## 2024-07-07 RX ADMIN — FERROUS SULFATE TAB 325 MG (65 MG ELEMENTAL FE) 325 MG: 325 (65 FE) TAB at 08:51

## 2024-07-07 RX ADMIN — Medication 10 ML: at 21:48

## 2024-07-07 RX ADMIN — ISOSORBIDE MONONITRATE 60 MG: 60 TABLET, EXTENDED RELEASE ORAL at 08:51

## 2024-07-07 NOTE — PLAN OF CARE
Goal Outcome Evaluation:  Plan of Care Reviewed With: patient        Progress: improving  Outcome Evaluation: Pt A&OX4. VSS on RA. PPP. C/O pain managed W/ PRN meds. Dressings to Abd C/D/I. Dressing to coccxy changed. Iv to L FA IID. Call light within reach, safety maintained.

## 2024-07-07 NOTE — PROGRESS NOTES
[facility] Medicine Services  INPATIENT PROGRESS NOTE    Patient Name: Tawny Shin  Date of Admission: 6/12/2024  Today's Date: 07/07/24  Length of Stay: 16  Primary Care Physician: Moises Oliveira MD    Subjective   Chief Complaint: Weakness.   HPI   Blood pressure slightly high but stable, afebrile.  Patient slept better last night.  DC fingers oximetry upon patient request. Waiting for rehab placement discussed with patient.    Review of Systems   Constitutional:  Positive for activity change, appetite change and fatigue. Negative for chills and fever.   HENT:  Negative for hearing loss, nosebleeds, tinnitus and trouble swallowing.    Eyes:  Negative for visual disturbance.   Respiratory:  Negative for cough, chest tightness, shortness of breath and wheezing.    Cardiovascular:  Negative for chest pain, palpitations and leg swelling.   Gastrointestinal: Right upper abdomen pain improving.  Endocrine: Negative for cold intolerance, heat intolerance, polydipsia, polyphagia and polyuria.   Genitourinary:  Negative for decreased urine volume, difficulty urinating, dysuria, flank pain, frequency and hematuria.   Musculoskeletal:  Positive for arthralgias, gait problem and myalgias. Negative for joint swelling.   Skin:  Negative for rash.   Allergic/Immunologic: Negative for immunocompromised state.   Neurological:  Positive for weakness. Negative for dizziness, syncope, light-headedness and headaches.   Hematological:  Negative for adenopathy. Does not bruise/bleed easily.  All pertinent negatives and positives are as above. All other systems have been reviewed and are negative unless otherwise stated.     Objective    Temp:  [97.7 °F (36.5 °C)-98.4 °F (36.9 °C)] 97.8 °F (36.6 °C)  Heart Rate:  [67-79] 67  Resp:  [16-18] 18  BP: (112-149)/(44-66) 149/66  Physical Exam  Vitals and nursing note reviewed.   Constitutional:       Comments: Chronically ill.  Deconditioning.  "  HENT:      Head: Normocephalic.   Eyes:      Conjunctiva/sclera: Conjunctivae normal.      Pupils: Pupils are equal, round, and reactive to light.   Cardiovascular:      Rate and Rhythm: Normal rate and regular rhythm.      Heart sounds: Normal heart sounds.   Pulmonary:      Effort: Pulmonary effort is normal. No respiratory distress.      Breath sounds: Normal breath sounds.   Abdominal:      General: Bowel sounds are normal. There is no distension.      Palpations: Abdomen is soft.  Right upper abdomen pain, from surgery.     Comments: Obesity.     Musculoskeletal:         General: No swelling.      Cervical back: Neck supple.   Skin:     General: Skin is warm and dry.      Capillary Refill: Capillary refill takes 2 to 3 seconds.      Findings: No rash.   Neurological:      Mental Status: She is alert and oriented to person, place, and time.      Motor: Weakness present.      Coordination: Coordination abnormal.      Gait: Gait abnormal.   Psychiatric:         Mood and Affect: Mood normal.         Behavior: Behavior normal.         Thought Content: Thought content normal.       Results Review:  I have reviewed the labs, radiology results, and diagnostic studies.    Laboratory Data:   Results from last 7 days   Lab Units 07/07/24  0459 07/05/24  0600 07/04/24  0603   WBC 10*3/mm3 5.48 5.98 4.81   HEMOGLOBIN g/dL 8.0* 7.9* 7.5*   HEMATOCRIT % 27.4* 26.7* 25.3*   PLATELETS 10*3/mm3 236 228 206        Results from last 7 days   Lab Units 07/07/24  0459 07/04/24  0603 07/01/24  0424   SODIUM mmol/L 137 136 136   POTASSIUM mmol/L 4.8 5.0 4.3   CHLORIDE mmol/L 103 103 102   CO2 mmol/L 27.0 24.0 23.0   BUN mg/dL 17 19 15   CREATININE mg/dL 0.77 0.89 0.91   CALCIUM mg/dL 8.7 8.9 9.0   BILIRUBIN mg/dL  --  0.2  --    ALK PHOS U/L  --  82  --    ALT (SGPT) U/L  --  26  --    AST (SGOT) U/L  --  43*  --    GLUCOSE mg/dL 77 122* 123*       Culture Data:   No results found for: \"BLOODCX\", \"URINECX\", \"WOUNDCX\", \"MRSACX\", " "\"RESPCX\", \"STOOLCX\"    Radiology Data:   Imaging Results (Last 24 Hours)       ** No results found for the last 24 hours. **            I have reviewed the patient's current medications.     Assessment/Plan   Assessment  Active Hospital Problems    Diagnosis     **Altered mental status     Acute gout     Esophageal dysphagia     UTI (urinary tract infection)     Hydronephrosis with urinary obstruction due to ureteral calculus     Acute cholecystitis     Hypothyroidism     Stage 3b chronic kidney disease     Near functional paraplegia     Cholelithiasis     Essential hypertension     Sick sinus syndrome     Coronary artery disease involving native coronary artery of native heart without angina pectoris     Venous insufficiency     Rheumatoid arthritis involving multiple sites     Chronic heart failure with preserved ejection fraction     Class 1 obesity due to excess calories with serious comorbidity and body mass index (BMI) of 33.0 to 33.9 in adult     Functional neurological symptom disorder with weakness or paralysis     Chronic anticoagulation     Chronic embolism and thrombosis of unspecified deep veins of left lower extremity        Treatment Plan  Gallstone, within the gallbladder neck/dysfunctional gallbladder.  Mild distended gallbladder.  Right upper quadrant tenderness.  Ultrasound of the gallbladder-Cholelithiasis with small shadowing stones and sludge within the region of the gallbladder neck- Moderate distention of the gallbladder- however no gallbladder wall thickening or pericholecystic fluid identified- No biliary dilatation.  HIDA scan-Nonvisualization of the gallbladder on images obtained up to 90 minutes- Cystic duct obstruction and acute cholecystitis cannot be excluded on the basis of this exam,  No evidence of common bile duct obstruction with emptying of activity into the C-loop of the duodenum and proximal jejunum, Gallstones and sludge were demonstrated on recent ultrasound- combined with " nonvisualization of the gallbladder exclude performance of an ejection fraction.  Status post cholecystectomy.     Hypertension/sick sinus syndrome/venous insufficiency/CHF.  Start back on Eliquis today.  Aspirin . Coreg . Lasix.  Imdur.  Cozaar . Aldactone.  Nitro as needed.     Recurrent UTI/neurogenic bladder.  Patient finished cefepime/Flagyl antibiotics.  Continue HipRex.  Repeated UA, negative for bacteria.  Consult ID.  Urology consulted.  CT scan abdomen pelvic . Obstructive uropathy on the left with mild to moderate dilatation of the upper tracts of the left kidney and left ureter into the true pelvis where there is a 5 x 6 mm calculus within the left ureter approximately 4 to 5 cm above the UVJ- right ureter is decompressed and normal in appearance- No evidence of renal mass. No perinephric fluid collection present, gallbladder is mildly distended- gallstones within the gallbladder neck,  No pericholecystic inflammatory changes or biliary dilatation, Constipation,  No obstruction or free air, Normal appendix, Calcified fibroid within the lower uterine segment, Mild constipation, No obstruction or free air, Normal appendix, Small fat-containing periumbilical hernia, Previous kyphoplasty at T12, Previous fusion at L3-L4 and L4-L5, Left basilar atelectasis..  Recommendation from urology-evaluation of neurogenic bladder at a university setting like [place].     Obstructing renal calculi left side/moderate dilated of left ureter.  Callus 5 x 6 mm and left 4 to 5 cm above the UVJ junction.  Status post 6/21/2024 urethroscope laser lithotripsy with stent insertion left, remove stent and Horan cath 6/29/2024.     Gout . New problem RLE pain with elevated uric acid level, continue colchicine.  Continue allopurinol.     Reflux/esophageal dysphagia. Dysphagia recent esophageal stretching, GI evaluated and recommended to continue outpatient follow-up.  SLP evaluated and recommended to continue regular diet.   Protonix . Zofran as needed.     Altered mental status/encephalopathy.  Back to baseline.  CT scan the head-No acute intracranial abnormality, Chronic sinusitis and left mastoid effusion.      Hypoxia.  Resolved.  Chest x-ray-Stable chest exam without acute process, No visualized infiltrate.   Patient is on room air.     Rheumatoid arthritis.  Rheumatoid factor quantitative normal.  Arava.  Wean down prednisone to home dose.  Doppler right lower extremity and NISREEN normal.     Hypothyroidism.  Synthroid.     Chronic stage IIIb renal failure.  Creatinine normalized.  Hold Lasix.      Hypomagnesia. Resolved.     Hyponatremia.  Resolved.     Hypokalemia.  Resolved.  Magnesium normal.     Right gluteal wound.  Consult wound care.     Rheumatoid arthritis. Arava.     Depression/anxiety.  Cymbalta.     Postmenopausal.  Estradiol cream.     Anemia.  Hemoglobin stable.  Iron sulfate.  Folic acid.  No sign of acute bleed.     Pain . lidocaine patch.  Voltaren gel.  Ultram as needed.     Chronic DVT.  Back on Eliquis.     Functional neurological symptom disorder with paraplegia.     Nutrition.  Cardiac diet.  Vitamin C.  Multivitamins.  Regular/house diet.  Boost supplement.     Deconditioning.  PT and OT consult.  Patient use a walker and bedbound at baseline.     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  Last urine culture shows no growth.  Blood culture-no growth for 5 days.     Dr. Shin's mom.  Patient already have home health at home.  Patient request for lift at discharge.  Patient wants to go to [facility] rehab.  Consult .     Medical Decision Making  Number and Complexity of problems: Recurrent UTI/altered mental status/reflux/rheumatoid arthritis/chronic 3B renal failure  Differential Diagnosis: None     Conditions and Status        Condition is unchanged.     [facility] Data  External documents reviewed: Previous note .  Cardiac tracing (EKG,  telemetry) interpretation: Sinus.  Radiology interpretation: X-ray/CT scan of the head  Labs reviewed: Laboratory  Any tests that were considered but not ordered: Lab in a.m.     Decision rules/scores evaluated (example [id number], Wells, etc): None     Discussed with: Patient and family     Care Planning  Shared decision making: Patient and family  Code status and discussions: DNR     Disposition  Social Determinants of Health that impact treatment or disposition: From home  Plan for rehab.    Electronically signed by Saulo Ambriz MD, 07/07/24, 10:08 CDT.

## 2024-07-08 ENCOUNTER — HOSPITAL ENCOUNTER (INPATIENT)
Age: 69
LOS: 18 days | Discharge: HOME HEALTH | End: 2024-07-26
Attending: PSYCHIATRY & NEUROLOGY | Admitting: PSYCHIATRY & NEUROLOGY
Payer: MEDICARE

## 2024-07-08 VITALS
DIASTOLIC BLOOD PRESSURE: 53 MMHG | HEIGHT: 66 IN | SYSTOLIC BLOOD PRESSURE: 109 MMHG | HEART RATE: 82 BPM | RESPIRATION RATE: 16 BRPM | TEMPERATURE: 97.4 F | BODY MASS INDEX: 33.59 KG/M2 | OXYGEN SATURATION: 94 % | WEIGHT: 209 LBS

## 2024-07-08 DIAGNOSIS — Z90.49 S/P CHOLECYSTECTOMY: Primary | ICD-10-CM

## 2024-07-08 DIAGNOSIS — M54.6 ACUTE MIDLINE THORACIC BACK PAIN: ICD-10-CM

## 2024-07-08 PROBLEM — K81.0 ACUTE CHOLECYSTITIS: Status: RESOLVED | Noted: 2024-06-12 | Resolved: 2024-07-08

## 2024-07-08 PROBLEM — N39.0 UTI (URINARY TRACT INFECTION): Status: RESOLVED | Noted: 2024-06-12 | Resolved: 2024-07-08

## 2024-07-08 PROBLEM — R41.82 ALTERED MENTAL STATUS: Status: RESOLVED | Noted: 2024-06-12 | Resolved: 2024-07-08

## 2024-07-08 PROBLEM — N13.2 HYDRONEPHROSIS WITH URINARY OBSTRUCTION DUE TO URETERAL CALCULUS: Status: RESOLVED | Noted: 2024-06-12 | Resolved: 2024-07-08

## 2024-07-08 PROCEDURE — 1180000000 HC REHAB R&B

## 2024-07-08 PROCEDURE — 63710000001 PREDNISONE PER 5 MG: Performed by: FAMILY MEDICINE

## 2024-07-08 PROCEDURE — 97535 SELF CARE MNGMENT TRAINING: CPT

## 2024-07-08 PROCEDURE — 6370000000 HC RX 637 (ALT 250 FOR IP): Performed by: PSYCHIATRY & NEUROLOGY

## 2024-07-08 PROCEDURE — 97110 THERAPEUTIC EXERCISES: CPT

## 2024-07-08 PROCEDURE — 97530 THERAPEUTIC ACTIVITIES: CPT

## 2024-07-08 RX ORDER — ALLOPURINOL 100 MG/1
100 TABLET ORAL DAILY
Qty: 30 TABLET | Refills: 0 | Status: SHIPPED | OUTPATIENT
Start: 2024-07-09

## 2024-07-08 RX ORDER — FUROSEMIDE 40 MG/1
40 TABLET ORAL DAILY
Status: DISCONTINUED | OUTPATIENT
Start: 2024-07-09 | End: 2024-07-26 | Stop reason: HOSPADM

## 2024-07-08 RX ORDER — LOSARTAN POTASSIUM 50 MG/1
50 TABLET ORAL DAILY
Status: DISCONTINUED | OUTPATIENT
Start: 2024-07-09 | End: 2024-07-26 | Stop reason: HOSPADM

## 2024-07-08 RX ORDER — LEFLUNOMIDE 20 MG/1
20 TABLET ORAL NIGHTLY
Status: DISCONTINUED | OUTPATIENT
Start: 2024-07-08 | End: 2024-07-26 | Stop reason: HOSPADM

## 2024-07-08 RX ORDER — METHENAMINE HIPPURATE 1000 MG/1
1 TABLET ORAL 2 TIMES DAILY WITH MEALS
Status: ON HOLD | COMMUNITY
End: 2024-07-25 | Stop reason: HOSPADM

## 2024-07-08 RX ORDER — ACETAMINOPHEN 325 MG/1
650 TABLET ORAL EVERY 6 HOURS PRN
Status: ON HOLD | COMMUNITY
End: 2024-07-25 | Stop reason: HOSPADM

## 2024-07-08 RX ORDER — ALLOPURINOL 100 MG/1
100 TABLET ORAL DAILY
Status: ON HOLD | COMMUNITY
Start: 2024-07-09 | End: 2024-07-25

## 2024-07-08 RX ORDER — FERROUS SULFATE 325(65) MG
325 TABLET ORAL
Status: DISCONTINUED | OUTPATIENT
Start: 2024-07-09 | End: 2024-07-26 | Stop reason: HOSPADM

## 2024-07-08 RX ORDER — OXYCODONE HYDROCHLORIDE AND ACETAMINOPHEN 5; 325 MG/1; MG/1
1 TABLET ORAL EVERY 6 HOURS PRN
Qty: 12 TABLET | Refills: 0 | Status: SHIPPED | OUTPATIENT
Start: 2024-07-08 | End: 2024-07-11

## 2024-07-08 RX ORDER — LEFLUNOMIDE 20 MG/1
20 TABLET ORAL NIGHTLY
Status: ON HOLD | COMMUNITY
End: 2024-07-25

## 2024-07-08 RX ORDER — INSULIN LISPRO 100 [IU]/ML
0-4 INJECTION, SOLUTION INTRAVENOUS; SUBCUTANEOUS NIGHTLY
Status: DISCONTINUED | OUTPATIENT
Start: 2024-07-08 | End: 2024-07-18

## 2024-07-08 RX ORDER — FOLIC ACID 1 MG/1
1 TABLET ORAL DAILY
Status: DISCONTINUED | OUTPATIENT
Start: 2024-07-09 | End: 2024-07-26 | Stop reason: HOSPADM

## 2024-07-08 RX ORDER — LOSARTAN POTASSIUM 50 MG/1
50 TABLET ORAL DAILY
Status: ON HOLD | COMMUNITY
End: 2024-07-25

## 2024-07-08 RX ORDER — ISOSORBIDE MONONITRATE 30 MG/1
60 TABLET, EXTENDED RELEASE ORAL DAILY
Status: DISCONTINUED | OUTPATIENT
Start: 2024-07-09 | End: 2024-07-26 | Stop reason: HOSPADM

## 2024-07-08 RX ORDER — DULOXETIN HYDROCHLORIDE 60 MG/1
60 CAPSULE, DELAYED RELEASE ORAL DAILY
Status: ON HOLD | COMMUNITY
End: 2024-07-25 | Stop reason: HOSPADM

## 2024-07-08 RX ORDER — LEVOTHYROXINE SODIUM 0.07 MG/1
75 TABLET ORAL DAILY
Status: DISCONTINUED | OUTPATIENT
Start: 2024-07-09 | End: 2024-07-26 | Stop reason: HOSPADM

## 2024-07-08 RX ORDER — METHENAMINE HIPPURATE 1000 MG/1
1 TABLET ORAL 2 TIMES DAILY WITH MEALS
Status: DISCONTINUED | OUTPATIENT
Start: 2024-07-09 | End: 2024-07-26 | Stop reason: HOSPADM

## 2024-07-08 RX ORDER — PANTOPRAZOLE SODIUM 40 MG/1
40 TABLET, DELAYED RELEASE ORAL DAILY
Status: DISCONTINUED | OUTPATIENT
Start: 2024-07-09 | End: 2024-07-26 | Stop reason: HOSPADM

## 2024-07-08 RX ORDER — ISOSORBIDE MONONITRATE 30 MG/1
60 TABLET, EXTENDED RELEASE ORAL 2 TIMES DAILY
Status: DISCONTINUED | OUTPATIENT
Start: 2024-07-08 | End: 2024-07-08

## 2024-07-08 RX ORDER — NITROGLYCERIN 0.4 MG/1
0.4 TABLET SUBLINGUAL EVERY 5 MIN PRN
Status: DISCONTINUED | OUTPATIENT
Start: 2024-07-08 | End: 2024-07-26 | Stop reason: HOSPADM

## 2024-07-08 RX ORDER — ESTRADIOL 0.1 MG/G
2 CREAM VAGINAL
Status: DISCONTINUED | OUTPATIENT
Start: 2024-07-11 | End: 2024-07-26 | Stop reason: HOSPADM

## 2024-07-08 RX ORDER — PREDNISONE 5 MG/1
5 TABLET ORAL DAILY
Status: DISCONTINUED | OUTPATIENT
Start: 2024-07-09 | End: 2024-07-26 | Stop reason: HOSPADM

## 2024-07-08 RX ORDER — POLYETHYLENE GLYCOL 3350 17 G/17G
17 POWDER, FOR SOLUTION ORAL DAILY PRN
Status: DISCONTINUED | OUTPATIENT
Start: 2024-07-08 | End: 2024-07-26 | Stop reason: HOSPADM

## 2024-07-08 RX ORDER — ASCORBIC ACID 500 MG
500 TABLET ORAL DAILY
Status: ON HOLD | COMMUNITY
End: 2024-07-25

## 2024-07-08 RX ORDER — SPIRONOLACTONE 25 MG/1
25 TABLET ORAL DAILY
Status: DISCONTINUED | OUTPATIENT
Start: 2024-07-09 | End: 2024-07-26 | Stop reason: HOSPADM

## 2024-07-08 RX ORDER — ESTRADIOL 0.1 MG/G
2 CREAM VAGINAL DAILY
Status: DISCONTINUED | OUTPATIENT
Start: 2024-07-09 | End: 2024-07-08

## 2024-07-08 RX ORDER — COLCHICINE 0.6 MG/1
0.6 TABLET ORAL DAILY
Status: ON HOLD | COMMUNITY
Start: 2024-07-09 | End: 2024-07-25

## 2024-07-08 RX ORDER — ALLOPURINOL 100 MG/1
100 TABLET ORAL DAILY
Status: DISCONTINUED | OUTPATIENT
Start: 2024-07-09 | End: 2024-07-26 | Stop reason: HOSPADM

## 2024-07-08 RX ORDER — ACETAMINOPHEN 325 MG/1
650 TABLET ORAL EVERY 6 HOURS PRN
Status: DISCONTINUED | OUTPATIENT
Start: 2024-07-08 | End: 2024-07-09 | Stop reason: SDUPTHER

## 2024-07-08 RX ORDER — TRAMADOL HYDROCHLORIDE 50 MG/1
50 TABLET ORAL EVERY 12 HOURS PRN
Status: ON HOLD | COMMUNITY
End: 2024-07-25

## 2024-07-08 RX ORDER — MULTIVIT WITH MINERALS/LUTEIN
500 TABLET ORAL DAILY
Status: DISCONTINUED | OUTPATIENT
Start: 2024-07-09 | End: 2024-07-26 | Stop reason: HOSPADM

## 2024-07-08 RX ORDER — ACETAMINOPHEN 325 MG/1
650 TABLET ORAL EVERY 4 HOURS PRN
Status: DISCONTINUED | OUTPATIENT
Start: 2024-07-08 | End: 2024-07-26 | Stop reason: HOSPADM

## 2024-07-08 RX ORDER — DULOXETIN HYDROCHLORIDE 60 MG/1
60 CAPSULE, DELAYED RELEASE ORAL DAILY
Status: DISCONTINUED | OUTPATIENT
Start: 2024-07-09 | End: 2024-07-26 | Stop reason: HOSPADM

## 2024-07-08 RX ORDER — COLCHICINE 0.6 MG/1
0.6 TABLET ORAL DAILY
Status: DISCONTINUED | OUTPATIENT
Start: 2024-07-09 | End: 2024-07-26 | Stop reason: HOSPADM

## 2024-07-08 RX ORDER — ASPIRIN 81 MG/1
81 TABLET ORAL DAILY
Status: DISCONTINUED | OUTPATIENT
Start: 2024-07-09 | End: 2024-07-26 | Stop reason: HOSPADM

## 2024-07-08 RX ORDER — ESTRADIOL 0.1 MG/G
2 CREAM VAGINAL SEE ADMIN INSTRUCTIONS
Status: ON HOLD | COMMUNITY
End: 2024-07-25 | Stop reason: HOSPADM

## 2024-07-08 RX ORDER — OXYCODONE HYDROCHLORIDE AND ACETAMINOPHEN 5; 325 MG/1; MG/1
1 TABLET ORAL EVERY 6 HOURS PRN
Status: DISCONTINUED | OUTPATIENT
Start: 2024-07-08 | End: 2024-07-23 | Stop reason: SDUPTHER

## 2024-07-08 RX ORDER — DEXTROSE MONOHYDRATE 100 MG/ML
INJECTION, SOLUTION INTRAVENOUS CONTINUOUS PRN
Status: DISCONTINUED | OUTPATIENT
Start: 2024-07-08 | End: 2024-07-26 | Stop reason: HOSPADM

## 2024-07-08 RX ORDER — COLCHICINE 0.6 MG/1
0.6 TABLET ORAL DAILY
Qty: 30 TABLET | Refills: 0 | Status: SHIPPED | OUTPATIENT
Start: 2024-07-09

## 2024-07-08 RX ORDER — CARVEDILOL 12.5 MG/1
25 TABLET ORAL 2 TIMES DAILY WITH MEALS
Status: DISCONTINUED | OUTPATIENT
Start: 2024-07-09 | End: 2024-07-26 | Stop reason: HOSPADM

## 2024-07-08 RX ORDER — TRAMADOL HYDROCHLORIDE 50 MG/1
50 TABLET ORAL EVERY 12 HOURS PRN
Status: DISCONTINUED | OUTPATIENT
Start: 2024-07-08 | End: 2024-07-26 | Stop reason: HOSPADM

## 2024-07-08 RX ORDER — LIDOCAINE 4 G/G
1 PATCH TOPICAL DAILY
Status: DISCONTINUED | OUTPATIENT
Start: 2024-07-09 | End: 2024-07-26 | Stop reason: HOSPADM

## 2024-07-08 RX ORDER — SPIRONOLACTONE 25 MG/1
25 TABLET ORAL DAILY
Status: ON HOLD | COMMUNITY
End: 2024-07-25

## 2024-07-08 RX ORDER — ISOSORBIDE MONONITRATE 60 MG/1
60 TABLET, EXTENDED RELEASE ORAL DAILY
Status: ON HOLD | COMMUNITY
End: 2024-07-25 | Stop reason: HOSPADM

## 2024-07-08 RX ORDER — OXYCODONE HYDROCHLORIDE AND ACETAMINOPHEN 5; 325 MG/1; MG/1
1 TABLET ORAL EVERY 6 HOURS PRN
Status: ON HOLD | COMMUNITY
End: 2024-07-25 | Stop reason: HOSPADM

## 2024-07-08 RX ORDER — PREDNISONE 5 MG/1
5 TABLET ORAL DAILY
Status: ON HOLD | COMMUNITY
End: 2024-07-25

## 2024-07-08 RX ORDER — M-VIT,TX,IRON,MINS/CALC/FOLIC 27MG-0.4MG
1 TABLET ORAL DAILY
Status: DISCONTINUED | OUTPATIENT
Start: 2024-07-09 | End: 2024-07-26 | Stop reason: HOSPADM

## 2024-07-08 RX ORDER — ASPIRIN 81 MG/1
81 TABLET ORAL DAILY
Status: ON HOLD | COMMUNITY
End: 2024-07-25 | Stop reason: HOSPADM

## 2024-07-08 RX ORDER — INSULIN LISPRO 100 [IU]/ML
0-4 INJECTION, SOLUTION INTRAVENOUS; SUBCUTANEOUS
Status: DISCONTINUED | OUTPATIENT
Start: 2024-07-09 | End: 2024-07-18

## 2024-07-08 RX ADMIN — PREDNISONE 5 MG: 5 TABLET ORAL at 08:27

## 2024-07-08 RX ADMIN — FERROUS SULFATE TAB 325 MG (65 MG ELEMENTAL FE) 325 MG: 325 (65 FE) TAB at 08:27

## 2024-07-08 RX ADMIN — COLCHICINE 0.6 MG: 0.6 TABLET ORAL at 08:27

## 2024-07-08 RX ADMIN — LEFLUNOMIDE 20 MG: 20 TABLET ORAL at 08:27

## 2024-07-08 RX ADMIN — OXYCODONE HYDROCHLORIDE AND ACETAMINOPHEN 500 MG: 500 TABLET ORAL at 08:27

## 2024-07-08 RX ADMIN — PANTOPRAZOLE SODIUM 40 MG: 40 TABLET, DELAYED RELEASE ORAL at 08:27

## 2024-07-08 RX ADMIN — FOLIC ACID 1000 MCG: 1 TABLET ORAL at 08:27

## 2024-07-08 RX ADMIN — LOSARTAN POTASSIUM 50 MG: 50 TABLET, FILM COATED ORAL at 08:27

## 2024-07-08 RX ADMIN — CARVEDILOL 25 MG: 25 TABLET, FILM COATED ORAL at 08:27

## 2024-07-08 RX ADMIN — ISOSORBIDE MONONITRATE 60 MG: 60 TABLET, EXTENDED RELEASE ORAL at 08:27

## 2024-07-08 RX ADMIN — ALLOPURINOL 100 MG: 100 TABLET ORAL at 08:27

## 2024-07-08 RX ADMIN — APIXABAN 5 MG: 5 TABLET, FILM COATED ORAL at 22:14

## 2024-07-08 RX ADMIN — SPIRONOLACTONE 25 MG: 25 TABLET ORAL at 08:27

## 2024-07-08 RX ADMIN — DULOXETINE HYDROCHLORIDE 60 MG: 30 CAPSULE, DELAYED RELEASE ORAL at 08:27

## 2024-07-08 RX ADMIN — ESTRADIOL 2 APPLICATOR: 0.1 CREAM VAGINAL at 08:28

## 2024-07-08 RX ADMIN — METHENAMINE HIPPURATE 1 G: 1000 TABLET ORAL at 08:28

## 2024-07-08 RX ADMIN — LEVOTHYROXINE SODIUM 75 MCG: 0.07 TABLET ORAL at 06:38

## 2024-07-08 RX ADMIN — LEFLUNOMIDE 20 MG: 20 TABLET ORAL at 22:09

## 2024-07-08 RX ADMIN — ASPIRIN 81 MG: 81 TABLET, COATED ORAL at 08:26

## 2024-07-08 RX ADMIN — TRAMADOL HYDROCHLORIDE 50 MG: 50 TABLET ORAL at 22:09

## 2024-07-08 RX ADMIN — CARVEDILOL 25 MG: 25 TABLET, FILM COATED ORAL at 17:19

## 2024-07-08 RX ADMIN — APIXABAN 5 MG: 5 TABLET, FILM COATED ORAL at 08:27

## 2024-07-08 RX ADMIN — Medication 10 ML: at 08:28

## 2024-07-08 RX ADMIN — Medication 1 TABLET: at 08:27

## 2024-07-08 ASSESSMENT — PAIN SCALES - GENERAL: PAINLEVEL_OUTOF10: 0

## 2024-07-08 NOTE — THERAPY TREATMENT NOTE
Acute Care - Physical Therapy Treatment Note  [facility]     Patient Name: Tawny Shin  : 1955  MRN: [id number]  Today's Date: 2024      Visit Dx:     ICD-10-CM ICD-9-CM   1. Acute UTI (urinary tract infection)  N39.0 599.0   2. Altered mental status, unspecified altered mental status type  R41.82 780.97   3. Open wound of right buttock, sequela  S31.819S 906.0   4. Cyst of buttocks  L72.9 706.2   5. Dysphagia, unspecified type  R13.10 787.20   6. Impaired mobility [Z74.09]  Z74.09 799.89   7. Hydronephrosis with urinary obstruction due to ureteral calculus  N13.2 592.1     591   8. Acute cholecystitis  K81.0 575.0   9. Acute congestive heart failure, unspecified heart failure type  I50.9 428.0     Patient Active Problem List   Diagnosis    T12 compression fracture, initial encounter    Chronic embolism and thrombosis of unspecified deep veins of left lower extremity    Chronic anticoagulation    Iron deficiency anemia    Osteoporosis    E coli bacteremia    Epidural hematoma    Pleural effusion, left    Functional neurological symptom disorder with weakness or paralysis    Class 1 obesity due to excess calories with serious comorbidity and body mass index (BMI) of 33.0 to 33.9 in adult    Rheumatoid arthritis involving multiple sites    Chronic heart failure with preserved ejection fraction    Venous insufficiency    Coronary artery disease involving native coronary artery of native heart without angina pectoris    Sick sinus syndrome    Essential hypertension    Pressure injury of skin of heel    Chronic pain    Anemia of chronic disease    Cholelithiasis    Pressure injury of skin of buttock    Near functional paraplegia    Skin cancer    Squamous cell carcinoma of back    Hematoma    Right-sided chest wall pain    Hyperlipidemia LDL goal <70    Malodorous urine    Stage 3b chronic kidney disease    Cyst of buttocks    Sepsis due to Escherichia coli without acute organ dysfunction    Generalized  weakness    Paralysis    History of urinary retention    Bacteremia due to Enterobacter species    Bacteremia    Hypothyroidism    Abnormal CT of the abdomen    Presence of cardiac pacemaker    Altered mental status    UTI (urinary tract infection)    Esophageal dysphagia    Hydronephrosis with urinary obstruction due to ureteral calculus    Acute cholecystitis    Acute gout     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    Elevated cholesterol     Eustachian tube dysfunction     Heart disease     Herpes simplex     History of transfusion     Hyperlipidemia     Hypertension     Hypothyroidism 12/23/2020    Intrinsic asthma 12/23/2020    Knee dislocation     Labral tear of right hip joint     Laryngitis sicca     Laryngitis, chronic     Left carotid bruit 03/09/2016    MI (myocardial infarction)     Myalgia due to statin 06/25/2019    Open wound of right hip 09/14/2021    Osteomyelitis of right femur 07/06/2021    Otorrhea     Pacemaker 11/17/2016    Primary osteoarthritis of left knee 12/23/2020    Psoriasis vulgaris 12/23/2020    S/P coronary artery stent placement 03/09/2016    Sensorineural hearing loss     Seropositive rheumatoid arthritis of multiple sites 12/27/2019    Overview:  -myochrysine '93-'96 -methotrexate '96--->11/98;r/s  restarted 2/99--> 8/14 (anemia) -sulfasalazine- not effective -penicillamine 6/98-->10/98; no effect -leflunomide 11/98--> - Humira '13-->didn't take - Enbrel  "12/14-->3/15- no effect!   Last Assessment & Plan:  - \"aching all over\" because she had to be off her anti-rheumatic drugs for 2 weeks in preparation for her R knee surgery - he    Sick sinus syndrome 12/27/2019    Sjogren's disease     Spondylolisthesis of lumbar region 01/17/2018    Syncope, recurrent 02/08/2021    Urinary tract infection     UTI (urinary tract infection) 04/19/2024     Past Surgical History:   Procedure Laterality Date    A-V CARDIAC PACEMAKER INSERTION  2016    ATRIAL CARDIAC PACEMAKER INSERTION      CARDIAC CATHETERIZATION      CATARACT EXTRACTION      CERVICAL CORPECTOMY N/A 3/3/2021    Procedure: CERVICAL 6 CORPECTOMY WITH TITANIUM CAGE WITH NEURO MONITORING;  Surgeon: Bandar Shea MD;  Location: [facility] OR;  Service: Neurosurgery;  Laterality: N/A;    CHOLECYSTECTOMY WITH INTRAOPERATIVE CHOLANGIOGRAM N/A 7/3/2024    Procedure: CHOLECYSTECTOMY LAPAROSCOPIC WITH INTRAOPERATIVE CHOLANGIOGRAM;  Surgeon: Moises Keyes MD;  Location: [facility] OR;  Service: General;  Laterality: N/A;    COLONOSCOPY  11/08/2011    One fold in the ascending colon which showed ulcer otherwise normal exam    COLONOSCOPY  11/12/2004    Normal exam repeat in five years    CORONARY ANGIOPLASTY WITH STENT PLACEMENT      X 2; 2013 & 2014    ENDOSCOPY  07/10/2014    Normal exam    ENDOSCOPY N/A 5/30/2024    Procedure: ESOPHAGOGASTRODUODENOSCOPY WITH ANESTHESIA;  Surgeon: Taiwo Sanchez MD;  Location: [facility] ENDOSCOPY;  Service: Gastroenterology;  Laterality: N/A;  preop; dysphagia  postop: balloon dilation  PCP Jesus Manuel jeff    FLAP LEG Right 9/14/2021    Procedure: RIGHT GLUTEAL FASCIOCUTANEOUS ADVANCEMENT FLAP AND RIGHT TENSOR FASCIAL JESSICA FLAP;  Surgeon: Amadeo Turner MD;  Location: [facility] OR;  Service: Plastics;  Laterality: Right;    HIP ABDUCTION TENOTOMY BILATERAL Right 1/14/2021    Procedure: RIGHT HIP GLUTEUS MEDLUS / MINIMUS REPAIR, POSSIBLE ACHILLES ALLOGRAFT;  Surgeon: Nino Carlson MD;  Location: [facility];  Service: Orthopedics;  Laterality: Right;    INCISION AND DRAINAGE ABSCESS Right 6/4/2022    Procedure: INCISION AND DRAINAGE ABSCESS right hip;  Surgeon: Magda Salcido MD;  Location:  [facility] OR;  Service: General;  Laterality: Right;    INCISION AND DRAINAGE ABSCESS Right 6/10/2022    Procedure: RIGHT HIP INCISION AND DRAINAGE. MD NEEDS 3L VANC IRRIGATION, CURRETTES, DAICANS, KERLEX ROLLS;  Surgeon: Amadeo Turner MD;  Location:  [facility] OR;  Service: Plastics;  Laterality: Right;    INCISION AND DRAINAGE HIP Right 2/9/2021    Procedure: HIP INCISION AND DRAINAGE;  Surgeon: Nino Carlson MD;  Location:  [facility] OR;  Service: Orthopedics;  Laterality: Right;    INCISION AND DRAINAGE LEG Right 10/24/2021    Procedure: INCISION AND DRAINAGE LOWER EXTREMITY;  Surgeon: Amadeo Turner MD;  Location:  [facility] OR;  Service: Plastics;  Laterality: Right;    INCISION AND DRAINAGE OF WOUND Right 7/8/2021    Procedure: INCISION AND DRAINAGE WOUND RIGHT HIP;  Surgeon: James Huntley MD;  Location:  [facility] OR;  Service: Orthopedics;  Laterality: Right;    JOINT REPLACEMENT      KYPHOPLASTY WITH BIOPSY Bilateral 10/26/2021    Procedure: THOARCIC 12 KYPHOPLASTY WITH BIOPSY;  Surgeon: Bandar Shea MD;  Location:  [facility] OR;  Service: Neurosurgery;  Laterality: Bilateral;    LEG DEBRIDEMENT Right 9/14/2021    Procedure: DEBRIDEMENT OF RIGHT HIP WOUND, RIGHT GLUTEAL FASCIOCUTANEOUS ADVANCEMENT FLAP AND RIGHT TENSOR FASCIAL JESSICA FLAP;  Surgeon: Amadeo Turner MD;  Location:  [facility] OR;  Service: Plastics;  Laterality: Right;    LUMBAR DISCECTOMY Right 3/23/2021    Procedure: LUMBAR DISCECTOMY MICRO, Lumbar 1/2 right;  Surgeon: Bandar Shea MD;  Location:  [facility] OR;  Service: Neurosurgery;  Laterality: Right;    LUMBAR FUSION N/A 1/19/2018    Procedure: L3-4,L4-5 DECOMPRESSION, POSTERIOR SPINAL FUSION WITH INSTRUMENTATION;  Surgeon: Fortino Oropeza MD;  Location:  [facility] OR;  Service:      LUMBAR LAMINECTOMY WITH FUSION Left 1/17/2018    Procedure: LEFT L3-4 L4-5 LATERAL LUMBAR INTERBODY FUSION;  Surgeon: Fortino Oropeza MD;  Location:  [facility] OR;  Service:     MASS EXCISION Right 4/23/2024    Procedure: RIGHT BUTTOCK MASS EXCISION;  Surgeon: Moises Keyes MD;  Location:  [facility] OR;  Service: General;  Laterality: Right;    MYRINGOTOMY W/ TUBES  09/04/2014    TUBES NO LONGER IN PLACE    OTHER SURGICAL HISTORY      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 so hardware was removed and spacers were placed    REPLACEMENT TOTAL KNEE Right     URETEROSCOPY LASER LITHOTRIPSY WITH STENT INSERTION N/A 6/21/2024    Procedure: URETEROSCOPY LASER LITHOTRIPSY WITH STENT INSERTION LEFT;  Surgeon: Ky Plascencia MD;  Location:  [facility] OR;  Service: Urology;  Laterality: N/A;     PT Assessment (Last 12 Hours)       PT Evaluation and Treatment       Row Name 07/08/24 0845          Physical Therapy Time and Intention    Subjective Information no complaints  -KJ     Document Type therapy note (daily note)  -KJ     Mode of Treatment physical therapy  -KJ     Patient Effort good  -KJ       Row Name 07/08/24 0845          General Information    Existing Precautions/Restrictions fall  -KJ       Row Name 07/08/24 0845          Pain    Pretreatment Pain Rating 0/10 - no pain  -KJ     Posttreatment Pain Rating 3/10  -KJ     Pain Location incisional  -KJ     Pain Location - abdomen  -KJ       Row Name 07/08/24 0845          Bed Mobility    Scooting/Bridging [place] (Bed Mobility) minimum assist (75% patient effort)  -KJ     Supine-Sit [place] (Bed Mobility) minimum assist (75% patient effort)  -KJ     Bed Mobility, Safety Issues decreased use of legs for bridging/pushing;decreased use of arms for pushing/pulling  -KJ       Row Name 07/08/24 0845          Sit-Stand Transfer    Sit-Stand [place] (Transfers) verbal cues;minimum assist (75% patient effort);moderate assist (50% patient effort)  -KJ     Comment, (Sit-Stand  Transfer) used the arms of a straight back chair and she was able to stand several at least 6 times leaning over and pushing on chair arms. The walker is too wide for her and not where her strength is  -KJ       Row Name 07/08/24 0845          Stand-Sit Transfer    Stand-Sit [place] (Transfers) verbal cues;minimum assist (75% patient effort)  -KJ       Row Name 07/08/24 0845          Gait/Stairs (Locomotion)    Comment, (Gait/Stairs) used standing lift to assist in standing long enough to move bed and put chair under her.  -KJ       Row Name 07/08/24 0845          Balance    Balance Assessment sitting static balance  -KJ     Static Sitting Balance modified independence  -KJ     Dynamic Sitting Balance contact guard  -KJ     Position, Sitting Balance sitting edge of bed  -KJ     Static Standing Balance minimal assist  -KJ     Dynamic Standing Balance maximum assist  -KJ     Position/Device Used, Standing Balance supported  -KJ       Row Name 07/08/24 0845          Motor Skills    Therapeutic Exercise aerobic  -KJ       Row Name 07/08/24 0845          Aerobic Exercise    Comment, Aerobic Exercise (Therapeutic Exercise) AROM x 20 reps  -KJ       Row Name             Wound 04/23/24 0936 Right gluteal Incision    Wound - Properties Group Placement Date: 04/23/24  -EP Placement Time: 0936  -EP Present on Original Admission: N  -EP Side: Right  -EP Location: gluteal  -EP Primary Wound Type: Incision  -EP    Retired Wound - Properties Group Placement Date: 04/23/24  -EP Placement Time: 0936  -EP Present on Original Admission: N  -EP Side: Right  -EP Location: gluteal  -EP Primary Wound Type: Incision  -EP    Retired Wound - Properties Group Date first assessed: 04/23/24  -EP Time first assessed: 0936  -EP Present on Original Admission: N  -EP Side: Right  -EP Location: gluteal  -EP Primary Wound Type: Incision  -EP      Row Name             Wound 07/03/24 0947 abdomen Incision    Wound - Properties Group Placement  Date: 07/03/24  - Placement Time: 0947 -HW Location: abdomen  -HW Primary Wound Type: Incision  -HW    Retired Wound - Properties Group Placement Date: 07/03/24  - Placement Time: 0947 -HW Location: abdomen  -HW Primary Wound Type: Incision  -HW    Retired Wound - Properties Group Date first assessed: 07/03/24  - Time first assessed: 0947 -HW Location: abdomen  -HW Primary Wound Type: Incision  -HW      Row Name 07/08/24 0845          Positioning and Restraints    Pre-Treatment Position in bed  -KJ     Post Treatment Position chair  -KJ     In Bed call light within reach  -KJ               User Key  (r) = Recorded By, (t) = Taken By, (c) = Cosigned By      Initials Name Provider Type    Emilee Gloria, PTA Physical Therapist Assistant     Bnejie Motta, RN Registered Nurse    Sonny Thompson RN Registered Nurse                    Physical Therapy Education       Title: PT OT SLP Therapies (Done)       Topic: Physical Therapy (Done)       Point: Mobility training (Done)       Learning Progress Summary             Patient Acceptance, E, VU,DU by KS at 7/4/2024 1722    Acceptance, E, VU by KR at 7/4/2024 1208    Acceptance, E, VU by KS at 6/20/2024 1715    Acceptance, E, VU by MS at 6/18/2024 1502    Comment: role of PT in her care   Caregiver Acceptance, E, VU,DU by KS at 7/4/2024 1722                         Point: Home exercise program (Done)       Learning Progress Summary             Patient Acceptance, E, VU,DU by KS at 7/4/2024 1722    Acceptance, E, VU by KS at 6/20/2024 1715    Acceptance, E,D,TB, VU,DU,NR by  at 6/15/2024 1514    Acceptance, E,TB,D, VU,DU,NR by  at 6/14/2024 1550   Caregiver Acceptance, E, VU,DU by KS at 7/4/2024 1722                         Point: Body mechanics (Done)       Learning Progress Summary             Patient Acceptance, E, VU,DU by KS at 7/4/2024 1722    Acceptance, E, VU by KS at 6/20/2024 1715    Acceptance, E,D,TB, VU,DU,NR by  at 6/15/2024 1514     Acceptance, E,TB,D, VU,DU,NR by ROGERS at 6/14/2024 1550   Caregiver Acceptance, E, VU,DU by KS at 7/4/2024 1722                         Point: Precautions (Done)       Learning Progress Summary             Patient Acceptance, E, VU,DU by KS at 7/4/2024 1722    Acceptance, E, VU by KR at 7/4/2024 1208    Acceptance, E, VU by KS at 6/20/2024 1715    Acceptance, E,D,TB, VU,DU,NR by ROGERS at 6/15/2024 1514    Acceptance, E,TB,D, VU,DU,NR by KH at 6/14/2024 1550   Caregiver Acceptance, E, VU,DU by KS at 7/4/2024 1722                                         User Key       Initials Effective Dates Name Provider Type Discipline    KR 06/03/24 -  Yue Hale, PT DPT Physical Therapist PT    MS 07/11/23 -  Nicole Olson, PT, DPT, NCS Physical Therapist PT    KS 02/27/23 -  Ewelina Morales RN Registered Nurse Nurse     10/17/23 -  Stephanie Doll RN Registered Nurse Nurse                  PT Recommendation and Plan     Plan of Care Reviewed With: patient  Progress: improving  Outcome Evaluation: PT tx completed. Pt has no c/o this morning. Eager to get to rehab. Bed mobility [place], sit<>stand several times using the chair arms of a straight back chair. This is where her strength is, the walker she is unable to stand with. So she is able to iniate leaning forward and pushing up and standing 15-30 seconds at a time. We used a standing lift; and transferred to to chair. Recommend rehab for cont PT/OT.   Outcome Measures       Row Name 07/08/24 1100 07/06/24 1506 07/05/24 1500       How much help from another person do you currently need...    Turning from your back to your side while in flat bed without using bedrails? 3  -KJ 3  -KINGS --    Moving from lying on back to sitting on the side of a flat bed without bedrails? 3  -KJ 3  -KINGS --    Moving to and from a bed to a chair (including a wheelchair)? 2  -KJ 1  -KINGS --    Standing up from a chair using your arms (e.g., wheelchair, bedside chair)? 1  -KJ 2  -KINGS --     Climbing 3-5 steps with a railing? 1  -KJ 1  -KINGS --    To walk in hospital room? 1  -KJ 1  -KINGS --    AM-PAC 6 Clicks Score (PT) 11  -KJ 11  -KINGS --    Highest Level of Mobility Goal 4 --> Transfer to chair/commode  -KJ 4 --> Transfer to chair/commode  -KINGS --       How much help from another is currently needed...    Putting on and taking off regular lower body clothing? -- -- 1  -EC    Bathing (including washing, rinsing, and drying) -- -- 2  -EC    Toileting (which includes using toilet bed pan or urinal) -- -- 2  -EC    Putting on and taking off regular upper body clothing -- -- 3  -EC    Taking care of personal grooming (such as brushing teeth) -- -- 3  -EC    Eating meals -- -- 4  -EC    AM-PAC 6 Clicks Score (OT) -- -- 15  -EC       Functional Assessment    Outcome Measure Options AM-PAC 6 Clicks Basic Mobility (PT)  -KJ AM-PAC 6 Clicks Basic Mobility (PT)  -KINGS AM-PAC 6 Clicks Daily Activity (OT)  -EC              User Key  (r) = Recorded By, (t) = Taken By, (c) = Cosigned By      Initials Name Provider Type    Emilee Gloria PTA Physical Therapist Assistant    Garcia Jaramillo PTA Physical Therapist Assistant    Ashley Nash, OTR/L Occupational Therapist                     Time Calculation:    PT Charges       Row Name 07/08/24 1110             Time Calculation    Start Time 0845  -KJ      Stop Time 0925  -KJ      Time Calculation (min) 40 min  -KJ      PT Received On 07/08/24  -KJ      PT Goal Re-Cert Due Date 07/14/24  -KJ         Time Calculation- PT    Total Timed Code Minutes- PT 40 minute(s)  -KJ                User Key  (r) = Recorded By, (t) = Taken By, (c) = Cosigned By      Initials Name Provider Type    Emilee Gloria PTA Physical Therapist Assistant                  Therapy Charges for Today       Code Description Service Date Service Provider Modifiers Qty    [id number]  PT THERAPEUTIC ACT EA 15 MIN 7/8/2024 Emilee Paul PTA GP 2    21248168591 HC PT THER PROC EA 15 MIN  7/8/2024 Emilee Paul, PTA GP 1            PT G-Codes  Outcome Measure Options: AM-PAC 6 Clicks Basic Mobility (PT)  AM-PAC 6 Clicks Score (PT): 11  AM-PAC 6 Clicks Score (OT): 15    Emilee Paul, PTA  7/8/2024

## 2024-07-08 NOTE — THERAPY TREATMENT NOTE
Acute Care - Physical Therapy Treatment Note  [facility]     Patient Name: Tawny Shin  : 1955  MRN: [id number]  Today's Date: 2024      Visit Dx:     ICD-10-CM ICD-9-CM   1. Acute UTI (urinary tract infection)  N39.0 599.0   2. Altered mental status, unspecified altered mental status type  R41.82 780.97   3. Open wound of right buttock, sequela  S31.819S 906.0   4. Cyst of buttocks  L72.9 706.2   5. Dysphagia, unspecified type  R13.10 787.20   6. Impaired mobility [Z74.09]  Z74.09 799.89   7. Hydronephrosis with urinary obstruction due to ureteral calculus  N13.2 592.1     591   8. Acute cholecystitis  K81.0 575.0   9. Acute congestive heart failure, unspecified heart failure type  I50.9 428.0   10. Acute gout, unspecified cause, unspecified site  M10.9 274.01     Patient Active Problem List   Diagnosis    T12 compression fracture, initial encounter    Chronic embolism and thrombosis of unspecified deep veins of left lower extremity    Chronic anticoagulation    Iron deficiency anemia    Osteoporosis    E coli bacteremia    Epidural hematoma    Pleural effusion, left    Functional neurological symptom disorder with weakness or paralysis    Class 1 obesity due to excess calories with serious comorbidity and body mass index (BMI) of 33.0 to 33.9 in adult    Rheumatoid arthritis involving multiple sites    Chronic heart failure with preserved ejection fraction    Venous insufficiency    Coronary artery disease involving native coronary artery of native heart without angina pectoris    Sick sinus syndrome    Essential hypertension    Pressure injury of skin of heel    Chronic pain    Anemia of chronic disease    Cholelithiasis    Pressure injury of skin of buttock    Near functional paraplegia    Skin cancer    Squamous cell carcinoma of back    Hematoma    Right-sided chest wall pain    Hyperlipidemia LDL goal <70    Malodorous urine    Stage 3b chronic kidney disease    Cyst of buttocks    Sepsis due  "to Escherichia coli without acute organ dysfunction    Generalized weakness    Paralysis    History of urinary retention    Bacteremia due to Enterobacter species    Bacteremia    Hypothyroidism    Abnormal CT of the abdomen    Presence of cardiac pacemaker    Esophageal dysphagia    Acute gout     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    Elevated cholesterol     Eustachian tube dysfunction     Heart disease     Herpes simplex     History of transfusion     Hyperlipidemia     Hypertension     Hypothyroidism 12/23/2020    Intrinsic asthma 12/23/2020    Knee dislocation     Labral tear of right hip joint     Laryngitis sicca     Laryngitis, chronic     Left carotid bruit 03/09/2016    MI (myocardial infarction)     Myalgia due to statin 06/25/2019    Open wound of right hip 09/14/2021    Osteomyelitis of right femur 07/06/2021    Otorrhea     Pacemaker 11/17/2016    Primary osteoarthritis of left knee 12/23/2020    Psoriasis vulgaris 12/23/2020    S/P coronary artery stent placement 03/09/2016    Sensorineural hearing loss     Seropositive rheumatoid arthritis of multiple sites 12/27/2019    Overview:  -myochrysine '93-'96 -methotrexate '96--->11/98;r/s  restarted 2/99--> 8/14 (anemia) -sulfasalazine- not effective -penicillamine 6/98-->10/98; no effect -leflunomide 11/98--> - Humira '13-->didn't take - Enbrel 12/14-->3/15- no effect!   Last Assessment & Plan:  - \"aching all over\" because she had " to be off her anti-rheumatic drugs for 2 weeks in preparation for her R knee surgery - he    Sick sinus syndrome 12/27/2019    Sjogren's disease     Spondylolisthesis of lumbar region 01/17/2018    Syncope, recurrent 02/08/2021    Urinary tract infection     UTI (urinary tract infection) 04/19/2024     Past Surgical History:   Procedure Laterality Date    A-V CARDIAC PACEMAKER INSERTION  2016    ATRIAL CARDIAC PACEMAKER INSERTION      CARDIAC CATHETERIZATION      CATARACT EXTRACTION      CERVICAL CORPECTOMY N/A 3/3/2021    Procedure: CERVICAL 6 CORPECTOMY WITH TITANIUM CAGE WITH NEURO MONITORING;  Surgeon: Bandar Shea MD;  Location: [facility] OR;  Service: Neurosurgery;  Laterality: N/A;    CHOLECYSTECTOMY WITH INTRAOPERATIVE CHOLANGIOGRAM N/A 7/3/2024    Procedure: CHOLECYSTECTOMY LAPAROSCOPIC WITH INTRAOPERATIVE CHOLANGIOGRAM;  Surgeon: Moises Keyes MD;  Location: [facility] OR;  Service: General;  Laterality: N/A;    COLONOSCOPY  11/08/2011    One fold in the ascending colon which showed ulcer otherwise normal exam    COLONOSCOPY  11/12/2004    Normal exam repeat in five years    CORONARY ANGIOPLASTY WITH STENT PLACEMENT      X 2; 2013 & 2014    ENDOSCOPY  07/10/2014    Normal exam    ENDOSCOPY N/A 5/30/2024    Procedure: ESOPHAGOGASTRODUODENOSCOPY WITH ANESTHESIA;  Surgeon: Taiwo Sanchez MD;  Location: [facility] ENDOSCOPY;  Service: Gastroenterology;  Laterality: N/A;  preop; dysphagia  postop: balloon dilation  PCP Jesus Manuel jeff    FLAP LEG Right 9/14/2021    Procedure: RIGHT GLUTEAL FASCIOCUTANEOUS ADVANCEMENT FLAP AND RIGHT TENSOR FASCIAL JESSICA FLAP;  Surgeon: Amadeo Turner MD;  Location: [facility] OR;  Service: Plastics;  Laterality: Right;    HIP ABDUCTION TENOTOMY BILATERAL Right 1/14/2021    Procedure: RIGHT HIP GLUTEUS MEDLUS / MINIMUS REPAIR, POSSIBLE ACHILLES ALLOGRAFT;  Surgeon: Nino Carlson MD;  Location: [facility] OR;  Service: Orthopedics;  Laterality: Right;    INCISION AND DRAINAGE ABSCESS  Right 6/4/2022    Procedure: INCISION AND DRAINAGE ABSCESS right hip;  Surgeon: Magda Salcido MD;  Location:  [facility] OR;  Service: General;  Laterality: Right;    INCISION AND DRAINAGE ABSCESS Right 6/10/2022    Procedure: RIGHT HIP INCISION AND DRAINAGE. MD NEEDS 3L VANC IRRIGATION, CURRETTES, DAICANS, KERLEX ROLLS;  Surgeon: Amadeo Turner MD;  Location:  [facility] OR;  Service: Plastics;  Laterality: Right;    INCISION AND DRAINAGE HIP Right 2/9/2021    Procedure: HIP INCISION AND DRAINAGE;  Surgeon: Nino Carlson MD;  Location:  [facility] OR;  Service: Orthopedics;  Laterality: Right;    INCISION AND DRAINAGE LEG Right 10/24/2021    Procedure: INCISION AND DRAINAGE LOWER EXTREMITY;  Surgeon: Amadeo Turner MD;  Location:  [facility] OR;  Service: Plastics;  Laterality: Right;    INCISION AND DRAINAGE OF WOUND Right 7/8/2021    Procedure: INCISION AND DRAINAGE WOUND RIGHT HIP;  Surgeon: James Huntley MD;  Location:  [facility] OR;  Service: Orthopedics;  Laterality: Right;    JOINT REPLACEMENT      KYPHOPLASTY WITH BIOPSY Bilateral 10/26/2021    Procedure: THOARCIC 12 KYPHOPLASTY WITH BIOPSY;  Surgeon: Bandar Shea MD;  Location:  [facility] OR;  Service: Neurosurgery;  Laterality: Bilateral;    LEG DEBRIDEMENT Right 9/14/2021    Procedure: DEBRIDEMENT OF RIGHT HIP WOUND, RIGHT GLUTEAL FASCIOCUTANEOUS ADVANCEMENT FLAP AND RIGHT TENSOR FASCIAL JESSICA FLAP;  Surgeon: Amadeo Turner MD;  Location:  [facility] OR;  Service: Plastics;  Laterality: Right;    LUMBAR DISCECTOMY Right 3/23/2021    Procedure: LUMBAR DISCECTOMY MICRO, Lumbar 1/2 right;  Surgeon: Bandar Shea MD;  Location:  [facility] OR;  Service: Neurosurgery;  Laterality: Right;    LUMBAR FUSION N/A 1/19/2018    Procedure: L3-4,L4-5 DECOMPRESSION, POSTERIOR SPINAL FUSION WITH INSTRUMENTATION;  Surgeon: Fortino Oropeza MD;  Location:  [facility] OR;  Service:     LUMBAR LAMINECTOMY WITH FUSION Left 1/17/2018    Procedure: LEFT L3-4 L4-5 LATERAL  LUMBAR INTERBODY FUSION;  Surgeon: Fortino Oropeza MD;  Location:  [facility] OR;  Service:     MASS EXCISION Right 4/23/2024    Procedure: RIGHT BUTTOCK MASS EXCISION;  Surgeon: Moises Keyes MD;  Location:  [facility] OR;  Service: General;  Laterality: Right;    MYRINGOTOMY W/ TUBES  09/04/2014    TUBES NO LONGER IN PLACE    OTHER SURGICAL HISTORY      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 so hardware was removed and spacers were placed    REPLACEMENT TOTAL KNEE Right     URETEROSCOPY LASER LITHOTRIPSY WITH STENT INSERTION N/A 6/21/2024    Procedure: URETEROSCOPY LASER LITHOTRIPSY WITH STENT INSERTION LEFT;  Surgeon: Ky Plascencia MD;  Location:  [facility] OR;  Service: Urology;  Laterality: N/A;     PT Assessment (Last 12 Hours)       PT Evaluation and Treatment       Row Name 07/08/24 1300 07/08/24 0845       Physical Therapy Time and Intention    Subjective Information no complaints  -KJ no complaints  -KJ    Document Type therapy note (daily note)  -KJ therapy note (daily note)  -KJ    Mode of Treatment physical therapy  -KJ physical therapy  -KJ    Patient Effort good  -KJ good  -KJ      Row Name 07/08/24 1300 07/08/24 0845       General Information    Existing Precautions/Restrictions fall  -KJ fall  -KJ      Row Name 07/08/24 1300 07/08/24 0845       Pain    Pretreatment Pain Rating 0/10 - no pain  -KJ 0/10 - no pain  -KJ    Posttreatment Pain Rating 3/10  -KJ 3/10  -KJ    Pain Location -- incisional  -KJ    Pain Location - -- abdomen  -KJ      Row Name 07/08/24 1300 07/08/24 0845       Bed Mobility    Scooting/Bridging [place] (Bed Mobility) -- minimum assist (75% patient effort)  -KJ    Supine-Sit [place] (Bed Mobility) -- minimum assist (75% patient effort)  -KJ    Bed Mobility, Safety Issues decreased use of legs for bridging/pushing;decreased use of arms for pushing/pulling  -KJ decreased use of legs for bridging/pushing;decreased use of arms for pushing/pulling  -KJ      Row Name 07/08/24 1300 07/08/24  0845       Sit-Stand Transfer    Sit-Stand [place] (Transfers) verbal cues;minimum assist (75% patient effort);2 person assist  -KJ verbal cues;minimum assist (75% patient effort);moderate assist (50% patient effort)  -KJ    Comment, (Sit-Stand Transfer) -- used the arms of a straight back chair and she was able to stand several at least 6 times leaning over and pushing on chair arms. The walker is too wide for her and not where her strength is  -KJ      Row Name 07/08/24 1300 07/08/24 0845       Stand-Sit Transfer    Stand-Sit [place] (Transfers) verbal cues;minimum assist (75% patient effort)  -KJ verbal cues;minimum assist (75% patient effort)  -KJ    Assistive Device (Stand-Sit Transfers) --  HH assistance  -KJ --      Row Name 07/08/24 1300 07/08/24 0845       Gait/Stairs (Locomotion)    Comment, (Gait/Stairs) stood and transferred to bed>shower chair Beata x 2  -KJ used standing lift to assist in standing long enough to move bed and put chair under her.  -KJ      Row Name 07/08/24 0845          Balance    Balance Assessment sitting static balance  -KJ     Static Sitting Balance modified independence  -KJ     Dynamic Sitting Balance contact guard  -KJ     Position, Sitting Balance sitting edge of bed  -KJ     Static Standing Balance minimal assist  -KJ     Dynamic Standing Balance maximum assist  -KJ     Position/Device Used, Standing Balance supported  -KJ       Row Name 07/08/24 0845          Motor Skills    Therapeutic Exercise aerobic  -KJ       Row Name 07/08/24 0845          Aerobic Exercise    Comment, Aerobic Exercise (Therapeutic Exercise) AROM x 20 reps  -KJ       Row Name             Wound 04/23/24 0936 Right gluteal Incision    Wound - Properties Group Placement Date: 04/23/24  -EP Placement Time: 0936  -EP Present on Original Admission: N  -EP Side: Right  -EP Location: gluteal  -EP Primary Wound Type: Incision  -EP    Retired Wound - Properties Group Placement Date: 04/23/24  -EP  Placement Time: 0936  -EP Present on Original Admission: N  -EP Side: Right  -EP Location: gluteal  -EP Primary Wound Type: Incision  -EP    Retired Wound - Properties Group Date first assessed: 04/23/24  -EP Time first assessed: 0936  -EP Present on Original Admission: N  -EP Side: Right  -EP Location: gluteal  -EP Primary Wound Type: Incision  -EP      Row Name             Wound 07/03/24 0947 abdomen Incision    Wound - Properties Group Placement Date: 07/03/24  -HW Placement Time: 0947 -HW Location: abdomen  -HW Primary Wound Type: Incision  -HW    Retired Wound - Properties Group Placement Date: 07/03/24  -HW Placement Time: 0947 -HW Location: abdomen  -HW Primary Wound Type: Incision  -HW    Retired Wound - Properties Group Date first assessed: 07/03/24  -HW Time first assessed: 0947 -HW Location: abdomen  -HW Primary Wound Type: Incision  -HW      Row Name 07/08/24 1300 07/08/24 0845       Positioning and Restraints    Pre-Treatment Position sitting in chair/recliner  -KJ in bed  -KJ    Post Treatment Position bed  -KJ chair  -KJ    In Bed -- call light within reach  -KJ              User Key  (r) = Recorded By, (t) = Taken By, (c) = Cosigned By      Initials Name Provider Type    Emilee Gloria PTA Physical Therapist Assistant     Benjie Motta, RN Registered Nurse    Sonny Thompson, RN Registered Nurse                    Physical Therapy Education       Title: PT OT SLP Therapies (Done)       Topic: Physical Therapy (Done)       Point: Mobility training (Done)       Learning Progress Summary             Patient Acceptance, E, VU,DU by KS at 7/4/2024 1722    Acceptance, E, VU by KR at 7/4/2024 1208    Acceptance, E, VU by KS at 6/20/2024 1715    Acceptance, E, VU by MS at 6/18/2024 1502    Comment: role of PT in her care   Caregiver Acceptance, E, VU,DU by KS at 7/4/2024 1722                         Point: Home exercise program (Done)       Learning Progress Summary             Patient  Acceptance, E, VU,DU by KS at 7/4/2024 1722    Acceptance, E, VU by KS at 6/20/2024 1715    Acceptance, E,D,TB, VU,DU,NR by  at 6/15/2024 1514    Acceptance, E,TB,D, VU,DU,NR by  at 6/14/2024 1550   Caregiver Acceptance, E, VU,DU by KS at 7/4/2024 1722                         Point: Body mechanics (Done)       Learning Progress Summary             Patient Acceptance, E, VU,DU by KS at 7/4/2024 1722    Acceptance, E, VU by KS at 6/20/2024 1715    Acceptance, E,D,TB, VU,DU,NR by  at 6/15/2024 1514    Acceptance, E,TB,D, VU,DU,NR by  at 6/14/2024 1550   Caregiver Acceptance, E, VU,DU by KS at 7/4/2024 1722                         Point: Precautions (Done)       Learning Progress Summary             Patient Acceptance, E, VU,DU by KS at 7/4/2024 1722    Acceptance, E, VU by  at 7/4/2024 1208    Acceptance, E, VU by KS at 6/20/2024 1715    Acceptance, E,D,TB, VU,DU,NR by  at 6/15/2024 1514    Acceptance, E,TB,D, VU,DU,NR by  at 6/14/2024 1550   Caregiver Acceptance, E, VU,DU by KS at 7/4/2024 1722                                         User Key       Initials Effective Dates Name Provider Type Discipline    KR 06/03/24 -  Yue Hale, PT DPT Physical Therapist PT    MS 07/11/23 -  Nicole Olson, PT, DPT, NCS Physical Therapist PT    KS 02/27/23 -  Ewelina Morales RN Registered Nurse Nurse     10/17/23 -  Stephanie Doll, RN Registered Nurse Nurse                  PT Recommendation and Plan     Plan of Care Reviewed With: patient  Progress: improving  Outcome Evaluation: PT tx completed. Pt has no c/o this morning. Eager to get to rehab. Bed mobility Beata, sit<>stand several times using the chair arms of a straight back chair. This is where her strength is, the walker she is unable to stand with. So she is able to iniate leaning forward and pushing up and standing 15-30 seconds at a time. We used a standing lift; and transferred to to chair. Recommend rehab for cont PT/OT.   Outcome Measures        Row Name 07/08/24 1100 07/06/24 1506 07/05/24 1500       How much help from another person do you currently need...    Turning from your back to your side while in flat bed without using bedrails? 3  -KJ 3  -KINGS --    Moving from lying on back to sitting on the side of a flat bed without bedrails? 3  -KJ 3  -KINGS --    Moving to and from a bed to a chair (including a wheelchair)? 2  -KJ 1  -KINGS --    Standing up from a chair using your arms (e.g., wheelchair, bedside chair)? 1  -KJ 2  -KINGS --    Climbing 3-5 steps with a railing? 1  -KJ 1  -KINGS --    To walk in hospital room? 1  -KJ 1  -KINGS --    AM-PAC 6 Clicks Score (PT) 11  -KJ 11  -KINGS --    Highest Level of Mobility Goal 4 --> Transfer to chair/commode  -KJ 4 --> Transfer to chair/commode  -KINGS --       How much help from another is currently needed...    Putting on and taking off regular lower body clothing? -- -- 1  -EC    Bathing (including washing, rinsing, and drying) -- -- 2  -EC    Toileting (which includes using toilet bed pan or urinal) -- -- 2  -EC    Putting on and taking off regular upper body clothing -- -- 3  -EC    Taking care of personal grooming (such as brushing teeth) -- -- 3  -EC    Eating meals -- -- 4  -EC    AM-PAC 6 Clicks Score (OT) -- -- 15  -EC       Functional Assessment    Outcome Measure Options AM-PAC 6 Clicks Basic Mobility (PT)  -KJ AM-PAC 6 Clicks Basic Mobility (PT)  -KINGS AM-PAC 6 Clicks Daily Activity (OT)  -EC              User Key  (r) = Recorded By, (t) = Taken By, (c) = Cosigned By      Initials Name Provider Type    Emilee Gloria, PTA Physical Therapist Assistant    Garcia Jaramillo PTA Physical Therapist Assistant    Ashley Nash, OTR/L Occupational Therapist                     Time Calculation:    PT Charges       Row Name 07/08/24 1416 07/08/24 1110          Time Calculation    Start Time 1300  -KJ 0845  -KJ     Stop Time 1324  -KJ 0925  -KJ     Time Calculation (min) 24 min  -KJ 40 min  -KJ     PT  Received On -- 07/08/24  -KJ     PT Goal Re-Cert Due Date -- 07/14/24  -KJ        Time Calculation- PT    Total Timed Code Minutes- PT 24 minute(s)  -KJ 40 minute(s)  -KJ               User Key  (r) = Recorded By, (t) = Taken By, (c) = Cosigned By      Initials Name Provider Type    Emilee Gloria, SERENA Physical Therapist Assistant                  Therapy Charges for Today       Code Description Service Date Service Provider Modifiers Qty    19055482375 HC PT THERAPEUTIC ACT EA 15 MIN 7/8/2024 Emilee Paul, SERENA GP 2    58385390895 HC PT THER PROC EA 15 MIN 7/8/2024 Emilee Paul, PTA GP 1    68552791032 HC PT THERAPEUTIC ACT EA 15 MIN 7/8/2024 Emiele Paul, PTA GP 2            PT G-Codes  Outcome Measure Options: AM-PAC 6 Clicks Basic Mobility (PT)  AM-PAC 6 Clicks Score (PT): 11  AM-PAC 6 Clicks Score (OT): 15    Emilee Paul PTA  7/8/2024

## 2024-07-08 NOTE — PLAN OF CARE
Goal Outcome Evaluation:  Plan of Care Reviewed With: patient        Progress: improving  Outcome Evaluation: PT tx completed. Pt has no c/o this morning. Eager to get to rehab. Bed mobility [place], sit<>stand several times using the chair arms of a straight back chair. This is where her strength is, the walker she is unable to stand with. So she is able to iniate leaning forward and pushing up and standing 15-30 seconds at a time. We used a standing lift; and transferred to to chair. Recommend rehab for cont PT/OT.

## 2024-07-08 NOTE — DISCHARGE SUMMARY
[facility] Medicine Services  DISCHARGE SUMMARY       Date of Admission: 6/12/2024  Date of Discharge:  7/8/2024  Primary Care Physician: Moises Oliveira MD    Presenting Problem/History of Present Illness:  Altered mental status     Final Discharge Diagnoses:  Active Hospital Problems    Diagnosis     Acute gout     Esophageal dysphagia     Hypothyroidism     Stage 3b chronic kidney disease     Near functional paraplegia     Cholelithiasis     Essential hypertension     Sick sinus syndrome     Coronary artery disease involving native coronary artery of native heart without angina pectoris     Venous insufficiency     Rheumatoid arthritis involving multiple sites     Chronic heart failure with preserved ejection fraction     Class 1 obesity due to excess calories with serious comorbidity and body mass index (BMI) of 33.0 to 33.9 in adult     Functional neurological symptom disorder with weakness or paralysis     Chronic anticoagulation     Chronic embolism and thrombosis of unspecified deep veins of left lower extremity        Consults: Gastroenterology, infectious disease, neurology, urology, general surgery,    Procedures Performed: Laparoscopic ostectomy with intraoperative cholangiogram    Pertinent Test Results:   Results for orders placed during the hospital encounter of 06/12/24    Adult Transthoracic Echo Complete w/ Color, Spectral and Contrast if necessary per protocol    Interpretation Summary    Left ventricular systolic function is normal. Left ventricular ejection fraction appears to be 61 - 65%.    Left ventricular wall thickness is consistent with mild concentric hypertrophy.    The left atrial cavity is mildly dilated.    Normal size and function of the right ventricle.    No significant valvular pathology.    Compared to prior exam from 4/24/2022, no significant change.      Imaging Results (All)       Procedure Component Value Units Date/Time    FL  Cholangiogram Operative [[id number]] Collected: 07/03/24 1038     Updated: 07/03/24 1043    Narrative:      EXAMINATION: FL CHOLANGIOGRAM OPERATIVE-     7/3/2024 9:00 AM     HISTORY: stones; N39.0-Urinary tract infection, site not specified;  R41.82-Altered mental status, unspecified; S31.819S-Unspecified open  wound of right buttock, sequela; L72.9-Follicular cyst of the skin and  subcutaneous tissue, unspecified; R13.10-Dysphagia, unspecified;  Z74.09-Other reduced mobility; N13.2-Hydronephrosis with renal and  ureteral calculous obstruction; K81.0-Acute cholecystitis; I50.9-Hea     Number of fluoroscopic spot images obtained = 1.     Fluoroscopy dose in Air Kerma mGy = 4.6865.     Fluoroscopy total dose area product (Gycm2) = 2.0600.     Fluoroscopy total exposure time = 19.0 seconds     Spot images obtained at the time of laparoscopic cholecystectomy show  cannulation of the cystic duct with contrast injection for opacification  of the biliary tree.     No filling defect is seen.     The intrahepatic bile ducts have a normal appearance.     There is no bile duct obstruction as contrast passes into the duodenum.     Summary :  1. No abnormality is seen.                    This report was signed and finalized on 7/3/2024 10:40 AM by Dr. Jesus Manuel Santos MD.       FL C Arm During Surgery [[id number]] Resulted: 07/03/24 1038     Updated: 07/03/24 1038    Narrative:      This procedure was auto-finalized with no dictation required.    US Venous Doppler Lower Extremity Right (duplex) [[id number]] Collected: 06/28/24 1432     Updated: 06/28/24 1435    Narrative:      History: Pain and swelling       Impression:      Impression: There is no evidence of deep venous thrombosis or  superficial thrombophlebitis of the right lower extremity.     Comments: Right lower extremity venous duplex exam was performed using  color Doppler flow, Doppler wave form analysis, and grayscale imaging,  with and without compression. There is no  evidence of deep venous  thrombosis of the common femoral, superficial femoral, popliteal,  posterior tibial, and peroneal veins. There is no thrombus identified in  the saphenofemoral junction or the greater saphenous vein.         This report was signed and finalized on 6/28/2024 2:32 PM by Dr. Kannan Mcgraw MD.       US Ankle / Brachial Indices Extremity Complete [[id number]] Collected: 06/28/24 1354     Updated: 06/28/24 1358    Narrative:         History: PAD     Comments: Bilateral lower extremity arterial with multi-level pulse  volume recordings and segmental pressures were performed at rest and  stress.     The right ankle/brachial index is 1.45. The waveforms are biphasic  without dampening. These findings are consistent with no significant  arterial insufficiency of the right lower extremity at rest.     The left ankle/brachial index is not obtainable due to  noncompressibility of the vessels. The waveforms are biphasic without  dampening.        Impression:      Impression:  1. No significant arterial insufficiency of the right lower extremity at  rest.  2. The left ankle/brachial index was not obtainable due to  noncompressibility of the vessels.         This report was signed and finalized on 6/28/2024 1:55 PM by Dr. Kannan Mcgraw MD.       CT Head Without Contrast [[id number]] Collected: 06/25/24 0934     Updated: 06/25/24 0939    Narrative:      EXAM: CT HEAD WO CONTRAST-      DATE: 6/25/2024 8:19 AM     HISTORY: AMS; N39.0-Urinary tract infection, site not specified;  R41.82-Altered mental status, unspecified; S31.819S-Unspecified open  wound of right buttock, sequela; L72.9-Follicular cyst of the skin and  subcutaneous tissue, unspecified; R13.10-Dysphagia, unspecified;  Z74.09-Other reduced mobility; N13.2-Hydronephrosis with renal and  ureteral calculous obstruction; K81.0-Acute cholecystitis       COMPARISON: 6/12/2024.     DOSE LENGTH PRODUCT: 775.35 mGy.cm  Automated exposure control was  also  utilized to decrease patient radiation dose.     TECHNIQUE: Unenhanced CT images obtained from vertex to skull base with  multiplanar reformats.     FINDINGS:  There is no acute intracranial hemorrhage, midline shift, mass effect,  or hydrocephalus. There is no CT evidence for acute infarct.     There are chronic changes with volume loss and chronic small vessel  ischemic change of the periventricular white matter.      SOFT TISSUES: The scalp soft tissues are unremarkable.        SINUS: There is mucosal thickening of the ethmoid air cells and sphenoid  sinus with complete opacification. There is also partial opacification  of the left mastoid air cells.     ORBITS: The visualized orbits and globes are unremarkable. There is  bilateral lens extraction.          Impression:      1. Chronic changes and no acute intracranial findings.   2. Paranasal sinus mucosal thickening and partial opacification of the  left mastoid air cells.     This report was signed and finalized on 6/25/2024 9:36 AM by Zander Motta.       NM HIDA SCAN WITHOUT PHARMACOLOGICAL INTERVENTION [[id number]] Collected: 06/22/24 1553     Updated: 06/22/24 1559    Narrative:      EXAMINATION: NM HIDA SCAN WITHOUT PHARMACOLOGICAL INTERVENTION-   6/22/2024 3:53 PM     HISTORY: Abdominal pain. Gallstones.     Radiopharmaceutical: Tc99m Hepatolite 5.20 mCi, IV.     Following intravenous injection of 5.2 mCi mebrofenin and multiple  planar images were obtained of the upper abdomen. This demonstrates  normal accumulation of activity within the hepatic parenchyma with  biliary activity identified 15 to 20 minutes post injection. There is no  evidence of common bile duct obstruction with activity emptied from the  common duct into the C-loop of the duodenum and proximal jejunum. There  is no visualization of the gallbladder at 60 minutes. An additional  delayed image of 90 minutes also demonstrates nonvisualization of the  gallbladder.         Impression  1. Nonvisualization of the gallbladder on images obtained up to 90  minutes. Cystic duct obstruction and acute cholecystitis cannot be  excluded on the basis of this exam.  2. No evidence of common bile duct obstruction with emptying of activity  into the C-loop of the duodenum and proximal jejunum.  3. Gallstones and sludge were demonstrated on recent ultrasound. This  combined with nonvisualization of the gallbladder exclude performance of  an ejection fraction..           This report was signed and finalized on 6/22/2024 3:55 PM by Dr. Neymar Calderon MD.       US Gallbladder [[id number]] Collected: 06/22/24 1037     Updated: 06/22/24 1043    Narrative:      US GALLBLADDER-     HISTORY: Quadrant pain, gallstones     COMPARISON: CT abdomen pelvis 6/20/2024     FINDINGS: Sonographic imaging of the right upper quadrant is performed.     Images of the pancreas are unremarkable. The proximal and mid abdominal  aorta are normal in caliber. Distal aorta is obscured by overlying  shadowing bowel gas. Proximal IVC is patent.     No focal liver mass is identified. Appropriate directional flow within  the main portal vein. Gallbladder is moderately distended containing  small shadowing stones and sludge within the region of the gallbladder  neck. No gallbladder wall thickening or pericholecystic fluid is  visualized. Common bile duct is normal in caliber measuring 4 mm.     Right kidney appears normal measuring 10.9 cm in length.       Impression:      1. Cholelithiasis with small shadowing stones and sludge within the  region of the gallbladder neck. Moderate distention of the gallbladder,  however no gallbladder wall thickening or pericholecystic fluid  identified. No biliary dilatation.     This report was signed and finalized on 6/22/2024 10:39 AM by Dr. Trinity Gómez MD.       XR Abdomen 1 View [[id number]] Collected: 06/21/24 1138     Updated: 06/21/24 1142    Narrative:      XR ABDOMEN 1 VW-  6/21/2024 10:00 AM     HISTORY: stent; N39.0-Urinary tract infection, site not specified;  R41.82-Altered mental status, unspecified; S31.819S-Unspecified open  wound of right buttock, sequela; L72.9-Follicular cyst of the skin and  subcutaneous tissue, unspecified; R13.10-Dysphagia, unspecified;  Z74.09-Other reduced mobility; N13.2-Hydronephrosis with renal and  ureteral calculous obstruction     COMPARISON: None     FLUOROSCOPY TIME: 0.3 minutes     FLUOROSCOPY DOSE: 2.8 mGy     NUMBER OF IMAGES: 3       Impression:         Intraoperative fluoroscopic images during cystoscopy with left  uteroscopy and stent placement.     Please refer to the operative note for more details.        This report was signed and finalized on 6/21/2024 11:39 AM by Dr. Wesley Mcfadden MD.       FL Surgery Fluoro [[id number]] Resulted: 06/21/24 1140     Updated: 06/21/24 1140    Narrative:      This procedure was auto-finalized with no dictation required.    CT Abdomen Pelvis Without Contrast [[id number]] Collected: 06/20/24 1333     Updated: 06/20/24 1350    Narrative:      CT ABDOMEN PELVIS WO CONTRAST- 6/20/2024 11:43 AM     HISTORY: Rule out kidney stone.; N39.0-Urinary tract infection, site not  specified; R41.82-Altered mental status, unspecified;  S31.819S-Unspecified open wound of right buttock, sequela;  L72.9-Follicular cyst of the skin and subcutaneous tissue, unspecified;  R13.10-Dysphagia, unspecified; Z74.09-Other reduced mobility      COMPARISON: Previous CT pelvis with contrast dated 4/21/2024     DLP: 633.64 mGy.cm . All CT scans are performed using dose optimization  techniques as appropriate to the performed exam and including at least  one of the following: Automated exposure control, adjustment of the mA  and/or kV according to size, and the use of the iterative reconstruction  technique.     TECHNIQUE: Noncontrast enhanced images of the abdomen and pelvis  obtained without oral contrast.     FINDINGS:  Left lingular  and left lower lobe subsegmental atelectasis is present.  The heart is mildly enlarged. Extensive coronary artery calcifications  are present. A transvenous pacer is in place..     LIVER: No focal liver lesion.     BILIARY SYSTEM: Gallstones are demonstrated within the gallbladder neck.  Gallbladder is mildly distended. There is no biliary dilatation  present..     PANCREAS: No focal pancreatic lesion.     SPLEEN: Splenic granulomas are present. No evidence of splenomegaly..     KIDNEYS AND ADRENALS: The adrenals are unremarkable. Calcifications  within the renal mahesh bilaterally are felt to be vascular in nature. No  perinephric fluid collections are present. No discrete renal mass. There  is asymmetric dilatation of the upper tracts of the left kidney. The  left ureter is asymmetrically distended to the level of the true pelvis.  There is a distal 6 x 5 mm intraureteral stone on image 117 of series 2  within the ureter approximately 4 cm above the left UVJ. The left ureter  below the level of the stone is decompressed.. The right ureter is  decompressed and normal in appearance..     RETROPERITONEUM: No mass, lymphadenopathy or hemorrhage.     GI TRACT: Constipation is present with a large volume of stool  throughout the colon. The small bowel is normal in distribution and  appearance. There is no gastric wall thickening or gastric outlet  obstruction.. The appendix is visualized and unremarkable.     OTHER: There is no mesenteric mass, lymphadenopathy or fluid collection.  The osseous structures and soft tissues demonstrate no worrisome  lesions. There is previous compression deformity at T12 with previous  kyphoplasty. The patient has undergone fusion at the L3-L4 and L4-L5  level with pedicle screws and interbody grafts. There is osteopenia. A  small fat-containing periumbilical hernia is present.     PELVIS: There is a calcified fibroid within the lower uterine segment.  No adnexal mass or free fluid.. The  urinary bladder is normal in  appearance.       Impression:      1. Obstructive uropathy on the left with mild to moderate dilatation of  the upper tracts of the left kidney and left ureter into the true pelvis  where there is a 5 x 6 mm calculus within the left ureter approximately  4 to 5 cm above the UVJ. The right ureter is decompressed and normal in  appearance. No evidence of renal mass. No perinephric fluid collection  present.  2. The gallbladder is mildly distended. There are gallstones within the  gallbladder neck. No pericholecystic inflammatory changes or biliary  dilatation.  3. Constipation. No obstruction or free air. Normal appendix.  3. Calcified fibroid within the lower uterine segment.  4. Mild constipation. No obstruction or free air. Normal appendix.  5. Small fat-containing periumbilical hernia.  6. Previous kyphoplasty at T12. Previous fusion at L3-L4 and L4-L5.  7. Left basilar atelectasis..           This report was signed and finalized on 6/20/2024 1:46 PM by Dr. Neymar Calderon MD.       CT Head Without Contrast [[id number]] Collected: 06/13/24 0618     Updated: 06/13/24 0628    Narrative:      EXAMINATION: CT HEAD WO CONTRAST-      6/12/2024 8:59 PM     HISTORY: altered mentation     In order to have a CT radiation dose as low as reasonably achievable  Automated Exposure Control was utilized for adjustment of the mA and/or  KV according to patient size.     Total DLP = 736.68 mGy.cm     The CT scan of the head is performed without intravenous contrast  enhancement.     Images are acquired in axial plane and subsequent reconstruction in  coronal and sagittal planes.     The comparison is made with the previous study dated 8/22/2023.     There is no evidence of a mass. There is no midline shift.     There is no evidence of intracranial hemorrhage or hematoma.     Moderately dilated ventricles, basal cisterns and the cortical sulci are  similar to the previous study representing chronic  volume loss.     Scattered areas of chronic white matter ischemia bilaterally are similar  to the previous study. The gray-white matter differentiation is  maintained.     A small empty sella turcica is seen.     Posterior fossa structures are unremarkable.     Images reviewed in bone window show no displaced acute skull fracture.  There is mucosal thickening involving the ethmoid maxillary and sphenoid  sinuses with left-sided mastoid effusion. Frontal sinuses are poorly  developed.       Impression:      1. No acute intracranial abnormality.  2. Chronic sinusitis and left mastoid effusion.     The above study was initially reviewed and reported by [facility]. I do not  find any discrepancies.                                      This report was signed and finalized on 6/13/2024 6:25 AM by Dr. Jose Patton MD.       XR Chest 1 View [[id number]] Collected: 06/12/24 2211     Updated: 06/12/24 2214    Narrative:      XR CHEST 1 VW- 6/12/2024 8:35 PM     HISTORY: fever       COMPARISON: Chest x-ray dated 5/24/2024     FINDINGS:  Upright frontal radiograph of the chest was obtained     No lung consolidation. No pleural effusion or pneumothorax. The  cardiomediastinal silhouette and pulmonary vascularity are within normal  limits. The osseous structures and surrounding soft tissues demonstrate  no acute abnormality. Left chest wall dual-chamber pacemaker.       Impression:      1.  Stable chest exam without acute process. No visualized infiltrate.     This report was signed and finalized on 6/12/2024 10:11 PM by Dr Riki David.             LAB RESULTS:      Lab 07/07/24  0459 07/05/24  0600 07/04/24  0603   WBC 5.48 5.98 4.81   HEMOGLOBIN 8.0* 7.9* 7.5*   HEMATOCRIT 27.4* 26.7* 25.3*   PLATELETS 236 228 206   MCV 96.1 94.0 94.1         Lab 07/07/24  0459 07/04/24  0603   SODIUM 137 136   POTASSIUM 4.8 5.0   CHLORIDE 103 103   CO2 27.0 24.0   ANION GAP 7.0 9.0   BUN 17 19   CREATININE 0.77 0.89   EGFR 83.1 69.8    GLUCOSE 77 122*   CALCIUM 8.7 8.9         Lab 07/04/24  0603   TOTAL PROTEIN 5.8*   ALBUMIN 2.8*   GLOBULIN 3.0   ALT (SGPT) 26   AST (SGOT) 43*   BILIRUBIN 0.2   ALK PHOS 82                     Brief Urine Lab Results  (Last result in the past 365 days)        Color   Clarity   Blood   Leuk Est   Nitrite   Protein   CREAT   Urine HCG        06/16/24 1219 Yellow   Clear   Negative   Large (3+)   Negative   Negative                 Microbiology Results (last 10 days)       ** No results found for the last 240 hours. **            Hospital Course:     Ms. Shin is a 68-year-old female from home with past medical history of COPD, asthma, right DVT, chronic pain syndrome, coronary artery disease, hyperlipidemia, hypertension, hypothyroidism, myocardial infarction, myalgia due to statin, osteoarthritis of left knee, psoriasis, Sjogren's disease and sick sinus syndrome, who was brought to the emergency room because of change in mental status, history was provided by caregiver at bedside.  Patient went to her cardiologist office today for echocardiogram, after which she came back, developed change in mental status [she was staring upwards to the hakeem and was not communicating], prompting emergency room visit.  In the emergency room, CT of the head was negative but urinalysis was supportive of urinary tract infection and patient was also found to be hypoxic and wheezing.  She was given ceftriaxone and steroid in the emergency room.  As of the time I saw patient, she was able to answer basic questions and also asked basic questions as well.    Patient was evaluated by gastroenterology for dysphagia her urine culture shows 100,000 CFU per milliliter Enterobacter cloacae complex.  There was no growth on blood cultures.  Patient was evaluated by ID for recurrent UTI as well as urology.  Patient had hydronephrosis with urinary obstruction due to ureteral calculus patient had ureteroscopy laser lithotripsy with stent insertion  "on the left patient was diagnosed with acute cholecystitis prompting a consult with general surgery patient had lap cholecystectomy done.  Patient will be discharged to rehab center today.  Physical Exam on Discharge:  /53 (BP Location: Right arm, Patient Position: Sitting)   Pulse 82   Temp 97.4 °F (36.3 °C) (Oral)   Resp 16   Ht 167.6 cm (66\")   Wt 94.8 kg (209 lb)   LMP  (LMP Unknown)   SpO2 94%   BMI 33.73 kg/m²   Physical Exam  General: Patient is alert, awake, oriented x 3 in no apparent distress at time of examination  HEENT: Normocephalic, atraumatic, pupils are equal reactive to light and accommodate.  Neck: Supple, no JVD, no carotid bruits  CVS: S1, S2, regular rhythm and rate  Lungs: Clear to auscultation bilaterally  Abdomen: Soft, nontender, nondistended bowel sounds are present  Extremities: No cyanosis, no clubbing no edema pulses are intact    Condition on Discharge: stable    Discharge Disposition:  Rehab Facility or Unit (DC - External)    Discharge Medications:     Discharge Medications        New Medications        Instructions Start Date   allopurinol 100 MG tablet  Commonly known as: ZYLOPRIM   100 mg, Oral, Daily   Start Date: July 9, 2024     colchicine 0.6 MG tablet   0.6 mg, Oral, Daily   Start Date: July 9, 2024     oxyCODONE-acetaminophen 5-325 MG per tablet  Commonly known as: Percocet   1 tablet, Oral, Every 6 Hours PRN             Continue These Medications        Instructions Start Date   acetaminophen 325 MG tablet  Commonly known as: TYLENOL   2 tablets, Oral, Every 6 Hours PRN      apixaban 5 MG tablet tablet  Commonly known as: Eliquis   5 mg, Oral, 2 Times Daily      aspirin 81 MG EC tablet   81 mg, Oral, Daily      carvedilol 25 MG tablet  Commonly known as: COREG   1 tablet, Oral, 2 Times Daily With Meals      Diclofenac Sodium 1 % gel gel  Commonly known as: VOLTAREN   4 g, Topical, 4 Times Daily PRN      DULoxetine 60 MG capsule  Commonly known as: CYMBALTA   1 " capsule, Oral, Daily      estradiol 0.1 MG/GM vaginal cream  Commonly known as: ESTRACE   2 applicators, Vaginal, Daily      ferrous sulfate 324 (65 Fe) MG tablet delayed-release EC tablet   1 tablet, Oral, Daily With Breakfast      folic acid 1 MG tablet  Commonly known as: FOLVITE   1 tablet, Oral, Daily      furosemide 40 MG tablet  Commonly known as: LASIX   1 tablet, Oral, Daily      isosorbide mononitrate 60 MG 24 hr tablet  Commonly known as: IMDUR   1 tablet, Oral, Daily      leflunomide 20 MG tablet  Commonly known as: ARAVA   20 mg, Oral, Every Night at Bedtime      levothyroxine 75 MCG tablet  Commonly known as: SYNTHROID, LEVOTHROID   1 tablet, Oral, Daily      lidocaine 5 %  Commonly known as: Lidoderm   1 patch, Transdermal, Daily PRN, Remove & Discard patch within 12 hours or as directed by MD      losartan 50 MG tablet  Commonly known as: COZAAR   1 tablet, Oral, Daily      methenamine 1 g tablet  Commonly known as: HIPREX   1 g, Oral, 2 Times Daily With Meals      multivitamin with minerals tablet tablet   1 tablet, Oral, Daily      nitroglycerin 0.4 MG SL tablet  Commonly known as: NITROSTAT   1 tablet, Sublingual, Every 5 Minutes PRN, Take no more than 3 doses in 15 minutes.      pantoprazole 40 MG EC tablet  Commonly known as: Protonix   40 mg, Oral, Daily      predniSONE 5 MG tablet  Commonly known as: DELTASONE   1 tablet, Oral, Daily      spironolactone 25 MG tablet  Commonly known as: ALDACTONE   25 mg, Oral, Daily      traMADol 50 MG tablet  Commonly known as: ULTRAM   1 tablet, Oral, Every 12 Hours PRN      Vitamin C 500 MG capsule   1 tablet, Oral, Daily             Stop These Medications      valACYclovir 500 MG tablet  Commonly known as: VALTREX                  Discharge Diet:   Diet Instructions    Regular diet           Activity at Discharge:   Activity Instructions       Activity as Tolerated              Follow-up Appointments:   Future Appointments   Date Time Provider Department  Center   7/15/2024  1:15 PM Carey Gilbert APRN [facility] PAD   7/24/2024 11:00 AM Ximena Vazquez APRN [facility] [place] [facility] CD PAD PAD   1/24/2025 10:45 AM [facility] PAD DEVICE CHECK [facility] CD PAD PAD   1/24/2025 11:15 AM Omar Hernandez MD [facility] CD PAD PAD       Test Results Pending at Discharge: none    Electronically signed by Gus Feldman MD, 07/08/24, 13:36 CDT.    Time: 30 minutes.

## 2024-07-08 NOTE — CASE MANAGEMENT/SOCIAL WORK
Continued Stay Note   [place]     Patient Name: Tawny Shin  MRN: [id number]  Today's Date: 7/8/2024    Admit Date: 6/12/2024    Plan: [facility]   Discharge Plan       Row Name 07/08/24 1156       Plan    Final Discharge Disposition Code 62 - inpatient rehab facility    Final Note Bed offered and ready at [facility] and they can accept pt today. No covid needed. Call report number is [phone]. Fax number is [phone].                   Discharge Codes    No documentation.                 Expected Discharge Date and Time       Expected Discharge Date Expected Discharge Time    Jul 2, 2024               OSIEL Pizano

## 2024-07-08 NOTE — THERAPY DISCHARGE NOTE
Acute Care - Occupational Therapy Treatment Note/Discharge  [facility]     Patient Name: Tawny Shin  : 1955  MRN: [id number]  Today's Date: 2024     Date of Referral to OT: 24         Admit Date: 2024       ICD-10-CM ICD-9-CM   1. Acute UTI (urinary tract infection)  N39.0 599.0   2. Altered mental status, unspecified altered mental status type  R41.82 780.97   3. Open wound of right buttock, sequela  S31.819S 906.0   4. Cyst of buttocks  L72.9 706.2   5. Dysphagia, unspecified type  R13.10 787.20   6. Impaired mobility [Z74.09]  Z74.09 799.89   7. Hydronephrosis with urinary obstruction due to ureteral calculus  N13.2 592.1     591   8. Acute cholecystitis  K81.0 575.0   9. Acute congestive heart failure, unspecified heart failure type  I50.9 428.0   10. Acute gout, unspecified cause, unspecified site  M10.9 274.01     Patient Active Problem List   Diagnosis    T12 compression fracture, initial encounter    Chronic embolism and thrombosis of unspecified deep veins of left lower extremity    Chronic anticoagulation    Iron deficiency anemia    Osteoporosis    E coli bacteremia    Epidural hematoma    Pleural effusion, left    Functional neurological symptom disorder with weakness or paralysis    Class 1 obesity due to excess calories with serious comorbidity and body mass index (BMI) of 33.0 to 33.9 in adult    Rheumatoid arthritis involving multiple sites    Chronic heart failure with preserved ejection fraction    Venous insufficiency    Coronary artery disease involving native coronary artery of native heart without angina pectoris    Sick sinus syndrome    Essential hypertension    Pressure injury of skin of heel    Chronic pain    Anemia of chronic disease    Cholelithiasis    Pressure injury of skin of buttock    Near functional paraplegia    Skin cancer    Squamous cell carcinoma of back    Hematoma    Right-sided chest wall pain    Hyperlipidemia LDL goal <70    Malodorous urine     Stage 3b chronic kidney disease    Cyst of buttocks    Sepsis due to Escherichia coli without acute organ dysfunction    Generalized weakness    Paralysis    History of urinary retention    Bacteremia due to Enterobacter species    Bacteremia    Hypothyroidism    Abnormal CT of the abdomen    Presence of cardiac pacemaker    Esophageal dysphagia    Acute gout     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    Elevated cholesterol     Eustachian tube dysfunction     Heart disease     Herpes simplex     History of transfusion     Hyperlipidemia     Hypertension     Hypothyroidism 12/23/2020    Intrinsic asthma 12/23/2020    Knee dislocation     Labral tear of right hip joint     Laryngitis sicca     Laryngitis, chronic     Left carotid bruit 03/09/2016    MI (myocardial infarction)     Myalgia due to statin 06/25/2019    Open wound of right hip 09/14/2021    Osteomyelitis of right femur 07/06/2021    Otorrhea     Pacemaker 11/17/2016    Primary osteoarthritis of left knee 12/23/2020    Psoriasis vulgaris 12/23/2020    S/P coronary artery stent placement 03/09/2016    Sensorineural hearing loss     Seropositive rheumatoid arthritis of multiple sites 12/27/2019    Overview:  -myochrysine '93-'96 -methotrexate '96--->11/98;r/s  restarted 2/99--> 8/14 (anemia) -sulfasalazine- not effective -penicillamine 6/98-->10/98; no effect -leflunomide 11/98--> - Humira '13-->didn't take - Enbrel 12/14-->3/15- no  "effect!   Last Assessment & Plan:  - \"aching all over\" because she had to be off her anti-rheumatic drugs for 2 weeks in preparation for her R knee surgery - he    Sick sinus syndrome 12/27/2019    Sjogren's disease     Spondylolisthesis of lumbar region 01/17/2018    Syncope, recurrent 02/08/2021    Urinary tract infection     UTI (urinary tract infection) 04/19/2024     Past Surgical History:   Procedure Laterality Date    A-V CARDIAC PACEMAKER INSERTION  2016    ATRIAL CARDIAC PACEMAKER INSERTION      CARDIAC CATHETERIZATION      CATARACT EXTRACTION      CERVICAL CORPECTOMY N/A 3/3/2021    Procedure: CERVICAL 6 CORPECTOMY WITH TITANIUM CAGE WITH NEURO MONITORING;  Surgeon: Bandar Shea MD;  Location: [facility] OR;  Service: Neurosurgery;  Laterality: N/A;    CHOLECYSTECTOMY WITH INTRAOPERATIVE CHOLANGIOGRAM N/A 7/3/2024    Procedure: CHOLECYSTECTOMY LAPAROSCOPIC WITH INTRAOPERATIVE CHOLANGIOGRAM;  Surgeon: Moises Keyes MD;  Location: [facility] OR;  Service: General;  Laterality: N/A;    COLONOSCOPY  11/08/2011    One fold in the ascending colon which showed ulcer otherwise normal exam    COLONOSCOPY  11/12/2004    Normal exam repeat in five years    CORONARY ANGIOPLASTY WITH STENT PLACEMENT      X 2; 2013 & 2014    ENDOSCOPY  07/10/2014    Normal exam    ENDOSCOPY N/A 5/30/2024    Procedure: ESOPHAGOGASTRODUODENOSCOPY WITH ANESTHESIA;  Surgeon: Taiwo Sanchez MD;  Location: [facility] ENDOSCOPY;  Service: Gastroenterology;  Laterality: N/A;  preop; dysphagia  postop: balloon dilation  PCP Jesus Manuel jeff    FLAP LEG Right 9/14/2021    Procedure: RIGHT GLUTEAL FASCIOCUTANEOUS ADVANCEMENT FLAP AND RIGHT TENSOR FASCIAL JESSICA FLAP;  Surgeon: Amadeo Turner MD;  Location: [facility] OR;  Service: Plastics;  Laterality: Right;    HIP ABDUCTION TENOTOMY BILATERAL Right 1/14/2021    Procedure: RIGHT HIP GLUTEUS MEDLUS / MINIMUS REPAIR, POSSIBLE ACHILLES ALLOGRAFT;  Surgeon: Nino Carlson MD;  Location: [facility] OR;  " Service: Orthopedics;  Laterality: Right;    INCISION AND DRAINAGE ABSCESS Right 6/4/2022    Procedure: INCISION AND DRAINAGE ABSCESS right hip;  Surgeon: Magda Salcido MD;  Location:  [facility] OR;  Service: General;  Laterality: Right;    INCISION AND DRAINAGE ABSCESS Right 6/10/2022    Procedure: RIGHT HIP INCISION AND DRAINAGE. MD NEEDS 3L VANC IRRIGATION, CURRETTES, DAICANS, KERLEX ROLLS;  Surgeon: Amadeo Turner MD;  Location:  [facility] OR;  Service: Plastics;  Laterality: Right;    INCISION AND DRAINAGE HIP Right 2/9/2021    Procedure: HIP INCISION AND DRAINAGE;  Surgeon: Nino Carlson MD;  Location:  [facility] OR;  Service: Orthopedics;  Laterality: Right;    INCISION AND DRAINAGE LEG Right 10/24/2021    Procedure: INCISION AND DRAINAGE LOWER EXTREMITY;  Surgeon: Amadeo Turner MD;  Location:  [facility] OR;  Service: Plastics;  Laterality: Right;    INCISION AND DRAINAGE OF WOUND Right 7/8/2021    Procedure: INCISION AND DRAINAGE WOUND RIGHT HIP;  Surgeon: James Huntley MD;  Location:  [facility] OR;  Service: Orthopedics;  Laterality: Right;    JOINT REPLACEMENT      KYPHOPLASTY WITH BIOPSY Bilateral 10/26/2021    Procedure: THOARCIC 12 KYPHOPLASTY WITH BIOPSY;  Surgeon: Bandar Shea MD;  Location:  [facility] OR;  Service: Neurosurgery;  Laterality: Bilateral;    LEG DEBRIDEMENT Right 9/14/2021    Procedure: DEBRIDEMENT OF RIGHT HIP WOUND, RIGHT GLUTEAL FASCIOCUTANEOUS ADVANCEMENT FLAP AND RIGHT TENSOR FASCIAL JESSICA FLAP;  Surgeon: Amadeo Turner MD;  Location:  [facility] OR;  Service: Plastics;  Laterality: Right;    LUMBAR DISCECTOMY Right 3/23/2021    Procedure: LUMBAR DISCECTOMY MICRO, Lumbar 1/2 right;  Surgeon: Bandar Shea MD;  Location:  [facility] OR;  Service: Neurosurgery;  Laterality: Right;    LUMBAR FUSION N/A 1/19/2018    Procedure: L3-4,L4-5 DECOMPRESSION, POSTERIOR SPINAL FUSION WITH INSTRUMENTATION;  Surgeon: Fortino Oropeza MD;  Location:  [facility] OR;  Service:     LUMBAR  LAMINECTOMY WITH FUSION Left 1/17/2018    Procedure: LEFT L3-4 L4-5 LATERAL LUMBAR INTERBODY FUSION;  Surgeon: Fortino Oropeza MD;  Location:  [facility] OR;  Service:     MASS EXCISION Right 4/23/2024    Procedure: RIGHT BUTTOCK MASS EXCISION;  Surgeon: Moises Keyes MD;  Location:  [facility] OR;  Service: General;  Laterality: Right;    MYRINGOTOMY W/ TUBES  09/04/2014    TUBES NO LONGER IN PLACE    OTHER SURGICAL HISTORY      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 so hardware was removed and spacers were placed    REPLACEMENT TOTAL KNEE Right     URETEROSCOPY LASER LITHOTRIPSY WITH STENT INSERTION N/A 6/21/2024    Procedure: URETEROSCOPY LASER LITHOTRIPSY WITH STENT INSERTION LEFT;  Surgeon: Ky Plascencia MD;  Location:  [facility] OR;  Service: Urology;  Laterality: N/A;       OT ASSESSMENT FLOWSHEET (Last 12 Hours)       OT Evaluation and Treatment       Row Name 07/08/24 2940                   OT Time and Intention    Subjective Information complains of;fatigue;pain;numbness  -TS        Document Type therapy note (daily note)  -TS        Mode of Treatment occupational therapy  -TS        Patient Effort good  -TS           General Information    Existing Precautions/Restrictions fall  -TS           Pain Assessment    Pretreatment Pain Rating 2/10  -TS        Posttreatment Pain Rating 2/10  -TS        Pain Location incisional  -TS        Pain Location - abdomen  -TS        Pre/Posttreatment Pain Comment numbness in RLE from seated position for shower in rolling shower chair  -TS           Cognition    Personal Safety Interventions fall prevention program maintained;gait belt;nonskid shoes/slippers when out of bed  -TS           Activities of Daily Living    BADL Assessment/Intervention bathing;upper body dressing;lower body dressing;toileting  -TS           Bathing Assessment/Intervention    [place] Level (Bathing) upper extremities;chest/trunk;standby assist;distal lower extremities/feet;moderate assist (50% patient  effort);perineal area;maximum assist (25% patient effort)  -TS        Assistive Devices (Bathing) hand-held shower spray hose;grab bar, tub/shower;shower commode chair, rolling  -TS        Position (Bathing) supported sitting  -TS           Upper Body Dressing Assessment/Training    [place] Level (Upper Body Dressing) don;doff;set up;contact guard assist  -TS        Position (Upper Body Dressing) supported sitting  -TS           Lower Body Dressing Assessment/Training    [place] Level (Lower Body Dressing) don;doff;socks;maximum assist (25% patient effort)  -TS        Position (Lower Body Dressing) supported sitting  -TS           Toileting Assessment/Training    [place] Level (Toileting) perform perineal hygiene;maximum assist (25% patient effort)  -TS        Position (Toileting) supported sitting  -TS           Bed Mobility    Sit-Supine [place] (Bed Mobility) moderate assist (50% patient effort);maximum assist (25% patient effort);2 person assist  -TS           Transfer Assessment/Treatment    Transfers chair-bed transfer  -TS           Chair-Bed Transfer    Chair-Bed [place] (Transfers) minimum assist (75% patient effort);moderate assist (50% patient effort);2 person assist  -TS        Comment, (Chair-Bed Transfer) rolling shower chair to EOB  -TS           Wound 04/23/24 0936 Right gluteal Incision    Wound - Properties Group Placement Date: 04/23/24  -EP Placement Time: 0936  -EP Present on Original Admission: N  -EP Side: Right  -EP Location: gluteal  -EP Primary Wound Type: Incision  -EP    Retired Wound - Properties Group Placement Date: 04/23/24  -EP Placement Time: 0936  -EP Present on Original Admission: N  -EP Side: Right  -EP Location: gluteal  -EP Primary Wound Type: Incision  -EP    Retired Wound - Properties Group Date first assessed: 04/23/24  -EP Time first assessed: 0936  -EP Present on Original Admission: N  -EP Side: Right  -EP Location: gluteal  -EP Primary Wound Type:  Incision  -EP       Wound 07/03/24 0947 abdomen Incision    Wound - Properties Group Placement Date: 07/03/24  - Placement Time: 0947 -HW Location: abdomen  -HW Primary Wound Type: Incision  -HW    Retired Wound - Properties Group Placement Date: 07/03/24  - Placement Time: 0947 -HW Location: abdomen  -HW Primary Wound Type: Incision  -HW    Retired Wound - Properties Group Date first assessed: 07/03/24  - Time first assessed: 0947 -HW Location: abdomen  -HW Primary Wound Type: Incision  -HW       Plan of Care Review    Plan of Care Reviewed With patient  -TS        Progress improving  -TS           Positioning and Restraints    Pre-Treatment Position in bed  -TS        Post Treatment Position bed  -TS        In Bed supine;call light within reach;encouraged to call for assist;side rails up x3  -TS           Transfer Goal 1 (OT)    Activity/Assistive Device (Transfer Goal 1, OT) commode, bedside without drop arms;sit-to-stand/stand-to-sit;bed-to-chair/chair-to-bed;wheelchair transfer  -TS        [place] Level/Cues Needed (Transfer Goal 1, OT) maximum assist (25-49% patient effort)  -TS        Time Frame (Transfer Goal 1, OT) long term goal (LTG);10 days  -TS        Progress/Outcome (Transfer Goal 1, OT) goal not met  -TS           Grooming Goal 1 (OT)    Activity/Device (Grooming Goal 1, OT) grooming skills, all  -TS        [place] (Grooming Goal 1, OT) standby assist  -TS        Time Frame (Grooming Goal 1, OT) long term goal (LTG)  -TS        Strategies/Barriers (Grooming Goal 1, OT) sitting EOB  -TS        Progress/Outcome (Grooming Goal 1, OT) goal not met  -TS           Strength Goal 1 (OT)    Strength Goal 1 (OT) Pt will be I with KRISTEN HEP strengthening to increase her I with ADLs  -TS        Time Frame (Strength Goal 1, OT) long term goal (LTG);10 days  -TS        Progress/Outcome (Strength Goal 1, OT) goal not met  -TS                  User Key  (r) = Recorded By, (t) = Taken By, (c) =  Cosigned By      Initials Name Effective Dates    TS Carolee Giordano NISREEN, CONTI 02/03/23 -     Benjie Melendez, SARAH 02/08/24 -     Sonny Thompson RN 01/22/24 -                     Occupational Therapy Education       Title: PT OT SLP Therapies (Done)       Topic: Occupational Therapy (Done)       Point: ADL training (Done)       Description:   Instruct learner(s) on proper safety adaptation and remediation techniques during self care or transfers.   Instruct in proper use of assistive devices.                  Learning Progress Summary             Patient Acceptance, E, VU by EC at 7/5/2024 1516    Acceptance, E, VU,DU by KS at 7/4/2024 1722    Acceptance, E, VU by EC at 7/4/2024 1540    Acceptance, E, VU,NR by  at 6/28/2024 1110    Acceptance, E, VU by KS at 6/20/2024 1715    Acceptance, E, VU by EC at 6/20/2024 1558    Acceptance, E, VU by EC at 6/17/2024 1525   Caregiver Acceptance, E, VU,DU by KS at 7/4/2024 1722    Acceptance, E, VU by EC at 6/20/2024 1558                         Point: Home exercise program (Done)       Description:   Instruct learner(s) on appropriate technique for monitoring, assisting and/or progressing therapeutic exercises/activities.                  Learning Progress Summary             Patient Acceptance, E, VU by EC at 7/5/2024 1516    Acceptance, E, VU,DU by KS at 7/4/2024 1722    Acceptance, E, VU by EC at 7/4/2024 1540    Acceptance, E, VU by KS at 6/20/2024 1715    Acceptance, E, VU by EC at 6/20/2024 1558   Caregiver Acceptance, E, VU,DU by KS at 7/4/2024 1722    Acceptance, E, VU by EC at 6/20/2024 1558                         Point: Precautions (Done)       Description:   Instruct learner(s) on prescribed precautions during self-care and functional transfers.                  Learning Progress Summary             Patient Acceptance, E, VU by EC at 7/5/2024 1516    Acceptance, E, VU,DU by KS at 7/4/2024 1722    Acceptance, E, VU by EC at 7/4/2024 1540    Acceptance, E,  VU,NR by LS at 6/28/2024 1110    Acceptance, E, VU by KS at 6/20/2024 1715    Acceptance, E, VU,NR by LS at 6/19/2024 1136    Acceptance, E, VU by EC at 6/17/2024 1525   Caregiver Acceptance, E, VU,DU by KS at 7/4/2024 1722                         Point: Body mechanics (Done)       Description:   Instruct learner(s) on proper positioning and spine alignment during self-care, functional mobility activities and/or exercises.                  Learning Progress Summary             Patient Acceptance, E, VU by EC at 7/5/2024 1516    Acceptance, E, VU,DU by KS at 7/4/2024 1722    Acceptance, E, VU by EC at 7/4/2024 1540    Acceptance, E, VU,NR by LS at 6/28/2024 1110    Acceptance, E, VU by KS at 6/20/2024 1715    Acceptance, E, VU by EC at 6/20/2024 1558    Acceptance, E, VU,NR by LS at 6/19/2024 1136    Acceptance, E, VU by EC at 6/17/2024 1525   Caregiver Acceptance, E, VU,DU by KS at 7/4/2024 1722    Acceptance, E, VU by EC at 6/20/2024 1558                                         User Key       Initials Effective Dates Name Provider Type Discipline    KS 02/27/23 -  Ewelina Morales RN Registered Nurse Nurse     06/20/22 -  Bianca Mcgarry, OTR/L Occupational Therapist OT    EC 10/13/23 -  Ashley Martino OTR/L Occupational Therapist OT                    OT Recommendation and Plan     Plan of Care Review  Plan of Care Reviewed With: patient  Progress: improving  Outcome Evaluation: Pt alert and oriented this PM. Pt sat EOB with SBA for greater than 20 minutes and completed self feeding and grooming tasks. Pt min/mod A for sit<>supine and SBA for rolling L/R in bed with bed rail. Pt plans to discharge home with caregivers and HH. Continue OT POC  Plan of Care Reviewed With: patient  Outcome Evaluation: Pt alert and oriented this PM. Pt sat EOB with SBA for greater than 20 minutes and completed self feeding and grooming tasks. Pt min/mod A for sit<>supine and SBA for rolling L/R in bed with bed rail. Pt plans to  discharge home with caregivers and HH. Continue OT POC      OT Rehab Goals       Row Name 07/08/24 1320             Transfer Goal 1 (OT)    Activity/Assistive Device (Transfer Goal 1, OT) commode, bedside without drop arms;sit-to-stand/stand-to-sit;bed-to-chair/chair-to-bed;wheelchair transfer  -TS      [place] Level/Cues Needed (Transfer Goal 1, OT) maximum assist (25-49% patient effort)  -TS      Time Frame (Transfer Goal 1, OT) long term goal (LTG);10 days  -TS      Progress/Outcome (Transfer Goal 1, OT) goal not met  -TS         Grooming Goal 1 (OT)    Activity/Device (Grooming Goal 1, OT) grooming skills, all  -TS      [place] (Grooming Goal 1, OT) standby assist  -TS      Time Frame (Grooming Goal 1, OT) long term goal (LTG)  -TS      Strategies/Barriers (Grooming Goal 1, OT) sitting EOB  -TS      Progress/Outcome (Grooming Goal 1, OT) goal not met  -TS         Strength Goal 1 (OT)    Strength Goal 1 (OT) Pt will be I with KRISTEN HEP strengthening to increase her I with ADLs  -TS      Time Frame (Strength Goal 1, OT) long term goal (LTG);10 days  -TS      Progress/Outcome (Strength Goal 1, OT) goal not met  -TS                User Key  (r) = Recorded By, (t) = Taken By, (c) = Cosigned By      Initials Name Provider Type Discipline    TS Carolee Giordano COTA Occupational Therapist Assistant OT                     Outcome Measures       Row Name 07/08/24 1400 07/08/24 1100 07/06/24 1506       How much help from another person do you currently need...    Turning from your back to your side while in flat bed without using bedrails? -- 3  -KJ 3  -KINGS    Moving from lying on back to sitting on the side of a flat bed without bedrails? -- 3  -KJ 3  -KINGS    Moving to and from a bed to a chair (including a wheelchair)? -- 2  -KJ 1  -KINGS    Standing up from a chair using your arms (e.g., wheelchair, bedside chair)? -- 1  -KJ 2  -KINGS    Climbing 3-5 steps with a railing? -- 1  -KJ 1  -KINGS    To walk in hospital  room? -- 1  -KJ 1  -KINGS    AM-PAC 6 Clicks Score (PT) -- 11  -KJ 11  -KINGS    Highest Level of Mobility Goal -- 4 --> Transfer to chair/commode  -KJ 4 --> Transfer to chair/commode  -KINGS       How much help from another is currently needed...    Putting on and taking off regular lower body clothing? 2  -TS -- --    Bathing (including washing, rinsing, and drying) 2  -TS -- --    Toileting (which includes using toilet bed pan or urinal) 2  -TS -- --    Putting on and taking off regular upper body clothing 3  -TS -- --    Taking care of personal grooming (such as brushing teeth) 3  -TS -- --    Eating meals 4  -TS -- --    AM-PAC 6 Clicks Score (OT) 16  -TS -- --       Functional Assessment    Outcome Measure Options -- AM-PAC 6 Clicks Basic Mobility (PT)  -KJ AM-PAC 6 Clicks Basic Mobility (PT)  -KINGS      Row Name 07/05/24 1500             How much help from another is currently needed...    Putting on and taking off regular lower body clothing? 1  -EC      Bathing (including washing, rinsing, and drying) 2  -EC      Toileting (which includes using toilet bed pan or urinal) 2  -EC      Putting on and taking off regular upper body clothing 3  -EC      Taking care of personal grooming (such as brushing teeth) 3  -EC      Eating meals 4  -EC      AM-PAC 6 Clicks Score (OT) 15  -EC         Functional Assessment    Outcome Measure Options AM-PAC 6 Clicks Daily Activity (OT)  -EC                User Key  (r) = Recorded By, (t) = Taken By, (c) = Cosigned By      Initials Name Provider Type    Emilee Gloria, PTA Physical Therapist Assistant    Carolee Roger COTA Occupational Therapist Assistant    Garcia Jaramillo, PTA Physical Therapist Assistant    Ashley Nash OTR/L Occupational Therapist                    Time Calculation:    Time Calculation- OT       Row Name 07/08/24 1426             Time Calculation- OT    OT Start Time 1320  -TS      OT Stop Time 1420  -TS      OT Time Calculation (min) 60 min   -TS      Total Timed Code Minutes- OT 60 minute(s)  -TS      OT Received On 07/08/24  -TS         Timed Charges    34818 - OT Self Care/Mgmt Minutes 60  -TS         Total Minutes    Timed Charges Total Minutes 60  -TS       Total Minutes 60  -TS                User Key  (r) = Recorded By, (t) = Taken By, (c) = Cosigned By      Initials Name Provider Type    TS Carolee Giordano COTA Occupational Therapist Assistant                  Timed Therapy Charges  Total Units: 4      Suggested Charges  Total Units: 4      Procedure Name Documented Minutes Units Code    HC OT SELF CARE/MGMT/TRAIN EA 15 MIN 60 4   56312 (CPT®)                 Documented Minutes  Total Minutes: 60      Therapy Provided Minutes    29926 - OT Self Care/Mgmt Minutes 60                  Therapy Charges for Today       Code Description Service Date Service Provider Modifiers Qty    [id number] HC OT SELF CARE/MGMT/TRAIN EA 15 MIN 7/8/2024 Carolee Giordano COTA GO 4                 OT Discharge Summary  Anticipated Discharge Disposition (OT): inpatient rehabilitation facility  Reason for Discharge: Discharge from facility  Outcomes Achieved: Refer to plan of care for updates on goals achieved  Discharge Destination: Inpatient rehabilitation facility    GALLITO Nravaez  7/8/2024

## 2024-07-09 PROBLEM — G82.20 PARAPLEGIA (HCC): Status: ACTIVE | Noted: 2024-07-09

## 2024-07-09 PROBLEM — R32 URINARY INCONTINENCE: Status: ACTIVE | Noted: 2024-07-09

## 2024-07-09 LAB
ALBUMIN SERPL-MCNC: 2.7 G/DL (ref 3.5–5.2)
ALP SERPL-CCNC: 89 U/L (ref 35–104)
ALT SERPL-CCNC: 26 U/L (ref 5–33)
ANION GAP SERPL CALCULATED.3IONS-SCNC: 12 MMOL/L (ref 7–19)
AST SERPL-CCNC: 28 U/L (ref 5–32)
BILIRUB SERPL-MCNC: 0.4 MG/DL (ref 0.2–1.2)
BUN SERPL-MCNC: 17 MG/DL (ref 8–23)
CALCIUM SERPL-MCNC: 8.4 MG/DL (ref 8.8–10.2)
CHLORIDE SERPL-SCNC: 104 MMOL/L (ref 98–111)
CO2 SERPL-SCNC: 24 MMOL/L (ref 22–29)
CREAT SERPL-MCNC: 0.8 MG/DL (ref 0.5–0.9)
ERYTHROCYTE [DISTWIDTH] IN BLOOD BY AUTOMATED COUNT: 20.9 % (ref 11.5–14.5)
GLUCOSE BLD-MCNC: 127 MG/DL (ref 70–99)
GLUCOSE BLD-MCNC: 146 MG/DL (ref 70–99)
GLUCOSE BLD-MCNC: 154 MG/DL (ref 70–99)
GLUCOSE BLD-MCNC: 99 MG/DL (ref 70–99)
GLUCOSE SERPL-MCNC: 86 MG/DL (ref 74–109)
HCT VFR BLD AUTO: 28.4 % (ref 37–47)
HGB BLD-MCNC: 8.5 G/DL (ref 12–16)
MCH RBC QN AUTO: 29.4 PG (ref 27–31)
MCHC RBC AUTO-ENTMCNC: 29.9 G/DL (ref 33–37)
MCV RBC AUTO: 98.3 FL (ref 81–99)
PERFORMED ON: ABNORMAL
PERFORMED ON: NORMAL
PLATELET # BLD AUTO: 208 K/UL (ref 130–400)
PMV BLD AUTO: 10.9 FL (ref 9.4–12.3)
POTASSIUM SERPL-SCNC: 4.4 MMOL/L (ref 3.5–5)
PREALB SERPL-MCNC: 25 MG/DL (ref 20–40)
PROT SERPL-MCNC: 5.7 G/DL (ref 6.6–8.7)
RBC # BLD AUTO: 2.89 M/UL (ref 4.2–5.4)
SODIUM SERPL-SCNC: 140 MMOL/L (ref 136–145)
TSH SERPL DL<=0.005 MIU/L-ACNC: 7.68 UIU/ML (ref 0.27–4.2)
VIT B12 SERPL-MCNC: 818 PG/ML (ref 211–946)
WBC # BLD AUTO: 4.8 K/UL (ref 4.8–10.8)

## 2024-07-09 PROCEDURE — 1180000000 HC REHAB R&B

## 2024-07-09 PROCEDURE — 82962 GLUCOSE BLOOD TEST: CPT

## 2024-07-09 PROCEDURE — 97535 SELF CARE MNGMENT TRAINING: CPT

## 2024-07-09 PROCEDURE — 94150 VITAL CAPACITY TEST: CPT

## 2024-07-09 PROCEDURE — 97161 PT EVAL LOW COMPLEX 20 MIN: CPT

## 2024-07-09 PROCEDURE — 2500000003 HC RX 250 WO HCPCS: Performed by: PSYCHIATRY & NEUROLOGY

## 2024-07-09 PROCEDURE — 80053 COMPREHEN METABOLIC PANEL: CPT

## 2024-07-09 PROCEDURE — 36415 COLL VENOUS BLD VENIPUNCTURE: CPT

## 2024-07-09 PROCEDURE — 84443 ASSAY THYROID STIM HORMONE: CPT

## 2024-07-09 PROCEDURE — 97530 THERAPEUTIC ACTIVITIES: CPT

## 2024-07-09 PROCEDURE — 84134 ASSAY OF PREALBUMIN: CPT

## 2024-07-09 PROCEDURE — 97110 THERAPEUTIC EXERCISES: CPT

## 2024-07-09 PROCEDURE — 99222 1ST HOSP IP/OBS MODERATE 55: CPT | Performed by: PSYCHIATRY & NEUROLOGY

## 2024-07-09 PROCEDURE — 97165 OT EVAL LOW COMPLEX 30 MIN: CPT

## 2024-07-09 PROCEDURE — 82607 VITAMIN B-12: CPT

## 2024-07-09 PROCEDURE — 6370000000 HC RX 637 (ALT 250 FOR IP): Performed by: PSYCHIATRY & NEUROLOGY

## 2024-07-09 PROCEDURE — 97116 GAIT TRAINING THERAPY: CPT

## 2024-07-09 PROCEDURE — 94760 N-INVAS EAR/PLS OXIMETRY 1: CPT

## 2024-07-09 PROCEDURE — 85027 COMPLETE CBC AUTOMATED: CPT

## 2024-07-09 RX ADMIN — PANTOPRAZOLE SODIUM 40 MG: 40 TABLET, DELAYED RELEASE ORAL at 09:15

## 2024-07-09 RX ADMIN — PREDNISONE 5 MG: 5 TABLET ORAL at 09:15

## 2024-07-09 RX ADMIN — METHENAMINE HIPPURATE 1 G: 1000 TABLET ORAL at 09:16

## 2024-07-09 RX ADMIN — Medication 500 MG: at 09:16

## 2024-07-09 RX ADMIN — LOSARTAN POTASSIUM 50 MG: 50 TABLET, FILM COATED ORAL at 09:16

## 2024-07-09 RX ADMIN — METHENAMINE HIPPURATE 1 G: 1000 TABLET ORAL at 17:29

## 2024-07-09 RX ADMIN — FUROSEMIDE 40 MG: 40 TABLET ORAL at 09:15

## 2024-07-09 RX ADMIN — ALLOPURINOL 100 MG: 100 TABLET ORAL at 09:15

## 2024-07-09 RX ADMIN — APIXABAN 5 MG: 5 TABLET, FILM COATED ORAL at 09:16

## 2024-07-09 RX ADMIN — Medication 1 TABLET: at 09:15

## 2024-07-09 RX ADMIN — LEVOTHYROXINE SODIUM 75 MCG: 75 TABLET ORAL at 05:42

## 2024-07-09 RX ADMIN — MICONAZOLE NITRATE: 2 POWDER TOPICAL at 13:28

## 2024-07-09 RX ADMIN — APIXABAN 5 MG: 5 TABLET, FILM COATED ORAL at 20:11

## 2024-07-09 RX ADMIN — COLCHICINE 0.6 MG: 0.6 TABLET, FILM COATED ORAL at 09:16

## 2024-07-09 RX ADMIN — DULOXETINE HYDROCHLORIDE 60 MG: 60 CAPSULE, DELAYED RELEASE ORAL at 09:16

## 2024-07-09 RX ADMIN — TRAMADOL HYDROCHLORIDE 50 MG: 50 TABLET ORAL at 20:11

## 2024-07-09 RX ADMIN — CARVEDILOL 25 MG: 12.5 TABLET, FILM COATED ORAL at 17:29

## 2024-07-09 RX ADMIN — ASPIRIN 81 MG: 81 TABLET, COATED ORAL at 09:15

## 2024-07-09 RX ADMIN — SPIRONOLACTONE 25 MG: 25 TABLET ORAL at 09:15

## 2024-07-09 RX ADMIN — ISOSORBIDE MONONITRATE 60 MG: 30 TABLET, EXTENDED RELEASE ORAL at 09:16

## 2024-07-09 RX ADMIN — LEFLUNOMIDE 20 MG: 20 TABLET ORAL at 20:10

## 2024-07-09 RX ADMIN — CARVEDILOL 25 MG: 12.5 TABLET, FILM COATED ORAL at 09:16

## 2024-07-09 RX ADMIN — FOLIC ACID 1 MG: 1 TABLET ORAL at 09:16

## 2024-07-09 RX ADMIN — FERROUS SULFATE TAB 325 MG (65 MG ELEMENTAL FE) 325 MG: 325 (65 FE) TAB at 09:15

## 2024-07-09 ASSESSMENT — PAIN DESCRIPTION - DESCRIPTORS: DESCRIPTORS: ACHING

## 2024-07-09 ASSESSMENT — PAIN DESCRIPTION - LOCATION: LOCATION: KNEE

## 2024-07-09 ASSESSMENT — PAIN DESCRIPTION - ORIENTATION: ORIENTATION: RIGHT;LEFT

## 2024-07-09 ASSESSMENT — PAIN SCALES - GENERAL
PAINLEVEL_OUTOF10: 1
PAINLEVEL_OUTOF10: 1
PAINLEVEL_OUTOF10: 3

## 2024-07-09 ASSESSMENT — PAIN - FUNCTIONAL ASSESSMENT: PAIN_FUNCTIONAL_ASSESSMENT: PREVENTS OR INTERFERES SOME ACTIVE ACTIVITIES AND ADLS

## 2024-07-09 NOTE — PROGRESS NOTES
Kelsey Morejon arrived to room # 815.   Presented with: cholecystectomy  Mental Status: Patient is oriented, alert, coherent, logical, thought processes intact, and able to concentrate and follow conversation.   Vitals:    07/08/24 2040   BP: 130/66   Pulse: 79   Resp: 16   Temp: (!) 96.6 °F (35.9 °C)   SpO2: 94%     Patient safety contract and falls prevention contract reviewed with patient Yes.  Oriented Patient and personal sitter to room.  Call light within reach. yes.  Needs, issues or concerns expressed at this time: no.      Electronically signed by Chetna Flores RN on 7/8/2024 at 9:53 PM

## 2024-07-09 NOTE — PROGRESS NOTES
07/09/24 1345   Restrictions/Precautions   Restrictions/Precautions Fall Risk;Isolation   General   Chart Reviewed Yes   Additional Pertinent Hx RA, pacemaker, CAD, MI, DVT, CHF, neurogenic bladder, HTN, charcot's joint, psoriasis vulgaris, osteoporosis, hypothyroidism, cervical spinal stenosis   Diagnosis Acute cholecystitis   Bed mobility   Rolling to Left Moderate assistance  (w/ use of bedrails and therapist)   Rolling to Right Moderate assistance  (w/ use of bedrails and therapist)   Supine to Sit Maximum assistance;Moderate assistance  (assist with LE and trunk; use of bedrails)   Sit to Supine Maximum assistance;Moderate assistance  (assist with LE and trunk; use of bedrails)   Transfers   Sit to Stand Maximum Assistance   Stand to Sit Maximum Assistance   Bed to Chair Maximum assistance  (kamlesh steady)   Wheelchair Activities   Propulsion Yes   Propulsion 1   Propulsion Manual   Level Level Tile   Method RUE;LUE   Level of Assistance Moderate assistance   Description/ Details veering to Left   Distance 50'   Propulsion 2   Propulsion 2 Manual   Level 2 Level Tile   Method 2 RUE;LUE   Level of Assistance 2 Moderate assistance   Distance 2 25'   PT Exercises   Exercise Treatment supine BLE exercises x10 ea   Assessment   Assessment pt stated that she was tired following morning sessions. able to propel wheelchair in graham; slow katt; needed significant rest break following. Used kamlesh steady to transfer from wheelchair to bed. able to perform sit <>  kamlesh steady w/ max assist x1. Tolerated supine exercises well; significant weakness in RLE compared to LLE. Needs significant rest breaks following all activities.   PT Individual Minutes   Time In 1345   Time Out 1430   Minutes 45     Electronically signed by GUEVARA Schultz on 7/9/2024 at 2:43 PM

## 2024-07-09 NOTE — PROGRESS NOTES
4 Eyes Skin Assessment     NAME:  Kelsey Morejon  YOB: 1955  MEDICAL RECORD NUMBER:  [id number]    The patient is being assessed for  Admission    I agree that at least one RN has performed a thorough Head to Toe Skin Assessment on the patient. ALL assessment sites listed below have been assessed.      Areas assessed by both nurses:    Head, Face, Ears, Shoulders, Back, Chest, Arms, Elbows, Hands, Sacrum. Buttock, Coccyx, Ischium, and Legs. Feet and Heels        Does the Patient have a Wound? Yes wound(s) were present on assessment. LDA wound assessment was Initiated and completed by RN       Varinder Prevention initiated by RN: Yes  Wound Care Orders initiated by RN: Yes    Pressure Injury (Stage 3,4, Unstageable, DTI, NWPT, and Complex wounds) if present, place Wound referral order by RN under : Yes    New Ostomies, if present place, Ostomy referral order under : No     Nurse 1 eSignature: Electronically signed by Chetna Flores RN on 7/8/24 at 9:55 PM CDT    **SHARE this note so that the co-signing nurse can place an eSignature**    Nurse 2 eSignature:   Electronically signed by Katt Romano RN on 7/8/24 at 9:56 PM CDT

## 2024-07-09 NOTE — THERAPY EVALUATION
Physical Therapy Evaluation Note  DATE:  2024  NAME:  Kelsey Morejon  :  1955  (68 y.o.,female)  MRN:  [id number]    HEIGHT:  Height: 167.6 cm (5' 6\")  WEIGHT:  Weight - Scale: 91.3 kg (201 lb 4.5 oz)    PATIENT DIAGNOSIS(ES):    Diagnosis: Acute cholecystitis    Additional Pertinent Hx: RA, pacemaker, CAD, MI, DVT, CHF, neurogenic bladder, HTN, charcot's joint, psoriasis vulgaris, osteoporosis, hypothyroidism, cervical spinal stenosis  RESTRICTIONS/PRECAUTIONS:    Restrictions/Precautions  Restrictions/Precautions: Fall Risk, Isolation     OVERALL  ORIENTATION STATUS:     PAIN:  Pain Level: 0                    GROSS ASSESSMENT        POSTURE/BALANCE  Balance  Sitting - Static: Good       ACTIVITY TOLERANCE         BED MOBILITY  Bed mobility  Rolling to Left: Moderate assistance (w/ use of bedrails and therapist)  Rolling to Right: Moderate assistance (w/ use of bedrails and therapist)  Supine to Sit: Maximum assistance, Moderate assistance (assist with LE and trunk; use of bedrails)  Sit to Supine: Maximum assistance, Moderate assistance (assist with LE and trunk; use of bedrails)  Scooting: Maximal assistance (w/ use of kaitlynn pad)        TRANSFERS  Transfers  Sit to Stand: Maximum Assistance  Stand to Sit: Maximum Assistance  Bed to Chair: Maximum assistance (kamlesh steady)  Stand Pivot Transfers: Maximum Assistance, 2 Person Assistance (to R; w/ rw)       AMBULATION 1     AMBULATION 2     STAIRS     WHEELCHAIR PROPULSION 1  Propulsion 1  Propulsion: Manual  Level: Level Tile  Method: RUBINA FITZPATRICK  Level of Assistance: Moderate assistance  Description/ Details: veering to Left  Distance: 50'  WHEELCHAIR PROPULSION 2  Propulsion 2  Propulsion 2: Manual  Level 2: Level Tile  Method 2: RUBINA FITZPATRICK  Level of Assistance 2: Moderate assistance  Distance 2: 25'    GOALS:  Short Term Goals  Time Frame for Short Term Goals: 1 Week  Short Term Goal 1: Roll L/R with use of bedrails w/ supervision assist  Short Term Goal 2:

## 2024-07-09 NOTE — PROGRESS NOTES
Behavioral-Environmental Outcomes: None Identified  Food/Nutrient Intake Outcomes: Food and Nutrient Intake  Physical Signs/Symptoms Outcomes: Biochemical Data, Nutrition Focused Physical Findings, Skin, Weight    Discharge Planning:    Too soon to determine     Carrie Montoya MS, RD, LD, CDCES  Contact: [phone]

## 2024-07-09 NOTE — PATIENT CARE CONFERENCE
[facility] ACUTE INPATIENT REHABILITATION  TEAM CONFERENCE NOTE    Date: 2024  Patient Name: Kelsey Morejon        MRN: [id number]    : 1955  (68 y.o.)  Gender: female      Diagnosis: Acute cholecystitis      PHYSICAL THERAPY:  Evaluate today      SPEECH THERAPY:  Evaluate as needed      OCCUPATIONAL THERAPY:  Evaluate today      NUTRITION  Current Wt: Weight - Scale: 91.3 kg (201 lb 4.5 oz) / Body mass index is 32.49 kg/m².  Admission Wt: Admission Body Weight: 91.3 kg (201 lb 4.5 oz)  Oral Diet Orders:     Oral Nutrition Supplement (ONS) Orders:    Please see nutrition note for details.      NURSING    Past Medical History:        Diagnosis Date    CAD (coronary artery disease)     S/p Stent to right coronary artery 2010. S/p myocardial infarction May 2013.    Cellulitis     LT LEG IN PAST    History of blood transfusion     History of DVT (deep vein thrombosis)     LT    History of neutropenia     History of pulmonary fibrosis     Hx of blood clots     LT LEG    Hypercholesterolemia     Hypertension     Intrinsic asthma     Pacemaker 2016    Post op infection     \"right knee leaking after my replacement\"    Rheumatoid arteritis (HCC)     Sinus node dysfunction (HCC)     Staph aureus infection     Status post placement of implantable loop recorder 2/13/15, 3/30/15    2/19/15  removed, infection    Syncope 2015    Thyroid disease     HYPOTHYROIDISM       Vitals:    24 0506 24 0709 24 0915 24 1042   BP: (!) 149/78  (!) 149/78    Pulse: 76  80    Resp: 18      Temp: (!) 96.4 °F (35.8 °C)      TempSrc: Temporal      SpO2: 95% 94%     Weight:       Height:    1.676 m (5' 6\")        SpO2: 94 %    On Room Air    Wounds/Incisions/Ulcers: Wounds present to right upper buttock/hip area. Wound resulting from non healing cyst removal on 24. 5 surgical stabs to abdomen. Aixa area red and excoriated. Abdominal folds and under left breast red.     Varinder Scale Score:  BK Morejon.

## 2024-07-09 NOTE — H&P
[facility]   History and Physical        Patient:   Kelsey Morejon  MR#:    [id number]  Account Number:                   [id number]      Room:    [facility]815-02   YOB: 1955  Date of Progress Note: 7/9/2024  Time of Note                           7:42 AM  Attending Physician:  Guy Stubbs MD        Admitting diagnosis:Acute cholecystitis    Secondary diagnoses:Charcot's joint, left ankle and foot,Unspecified asthma, uncomplicated,Psoriasis vulgaris,Hypothyroidism, unspecified,Heart disease, unspecified,Atherosclerotic heart disease of native coronary artery without angina pectoris,Hyperlipidemia, unspecified,Essential (primary) hypertension,Rheumatoid arthritis with rheumatoid factor of multiple sites without organ or systems involvement,Sick sinus syndrome,Spinal stenosis, cervical region,Cervical disc disorder with myelopathy, high cervical region,Moderate protein-calorie malnutrition,Urinary tract infection, site not specified,Age-related osteoporosis without current pathological fracture,Iron deficiency anemia, unspecified,Long term (current) use of anticoagulants,Chronic embolism and thrombosis of unspecified deep veins of left lower extremity,Gout, unspecified,Other dysphagia,Hydronephrosis with renal and ureteral calculous obstruction,Presence of cardiac pacemaker,Flaccid neuropathic bladder, not elsewhere classified    CHIEF COMPLAINT: Status post cholecystectomy      HISTORY OF PRESENT ILLNESS:   This 68 y.o. female  with PMH RA, cardiac pacemaker, CAD, MI, thyroid disorder, DVT,  paraplegia secondary to injury, CHF, neurogenic bladder, possible underlying MCI. Patient admitted on 6/13/2024 with AMS. She was found to have UTI  and left ureteral calculus with obstruction and acute hypoxic respiratory failure. She has been treated with Cefepime. Lithotripsy performed on 6/21/2024. GI was consulted on 6/21/2024 for abdominal pain. GB US showed cholelithiasis with small stones and sludge. HIDA scan  midnight of the day of the patient's admission to the inpatient rehabilitation facility    Expected duration and frequency therapy: 180 minutes per day, 5 days per week    Interdisciplinary team: The patient demonstrates the need for an interdisciplinary team for active management of the following medical issues including ataxia, motor planning, balance, disease management, elimination, endurance, family training, education, independent ADLs, pain management, precautions, range of motion, safety, strength, and transfers    I have reviewed the preadmission screening documents and concur with the findings. I believe the patient meets criteria and is sufficiently stable to allow participation in the program. This requires an intensive level of therapy, close medical supervision, and an interdisciplinary team approach provided through an individualized plan of care. I approve admitting this patient for an intensive inpatient rehabilitation program.    Please feel free to call with any questions   [phone] (cell phone)    Dr Guy Stubbs  Board certified in Neurology  Board Certified in Clinical Neurophysiology  Fellowship Trained in Clinical Neurophysiology      EMR Dragon/transcription disclaimer:Significant part of this  encounter note is electronic transcription/translation of spoken language to printed text. The electronic translation of spoken language may be erroneous, or at times, nonsensical words or phrases may be inadvertently transcribed. Although I have reviewed the note for such errors, some may still exist.

## 2024-07-09 NOTE — PROGRESS NOTES
Proximally/distally 4/5  RUE Strength  Gross RUE Strength: Exceptions to WFL  R Hand General: 4/5  RUE Strength Comment: Proximally/distally 4/5           Education  Education Given To: Patient, Family  Education Provided: Role of Therapy, Plan of Care, ADL Function, Mobility Training, Transfer Training  Education Method: Demonstration, Verbal  Barriers to Learning: None  Education Outcome: Verbalized understanding, Demonstrated understanding, Continued education needed     OutComes Score                                                Toileting Hygiene  Assistance needed: Dependent  Comment: using Kamlesh ko x2 assist  CARE Score: 1  Discharge Goal: Partial/moderate assistance      Toilet Transfer  Assistance needed: Dependent  Comment: with kamlesh stedy  CARE Score: 1  Discharge Goal: Partial/moderate assistance    Balance  Sitting Balance: Stand by assistance  Standing Balance: Dependent/Total (Mod-Max A with kamlesh stedy)          Eating  Assistance Needed: Setup or clean-up assistance  CARE Score: 5  Discharge Goal: Independent    Shower/Bathe Self  Assistance Needed: Substantial/maximal assistance  Comment: shower using Shower joy  CARE Score: 2  Discharge Goal: Partial/moderate assistance    Upper Body Dressing  Assistance Needed: Partial/moderate assistance  CARE Score: 3  Discharge Goal: Set-up or clean-up assistance      Lower Body Dressing  Assistance Needed: Dependent  CARE Score: 1  Discharge Goal: Partial/moderate assistance    Putting On/Taking Off Footwear  Assistance Needed: Dependent  CARE Score: 1  Discharge Goal: Partial/moderate assistance      Picking Up Object  Assistance Needed: Dependent  CARE Score: 1  Discharge Goal: Substantial/maximal assistance  Goals  Short Term Goals  Time Frame for Short Term Goals: 2 weeks  Short Term Goal 1: Mod A with LB dressing, AE PRN.  Short Term Goal 2: Mod A with donning/doffing socks and shoes, AE PRN.  Short Term Goal 3: Mod A with bathing hygiene.  Short  Term Goal 4: Patient will complete toileting with Max A.  Short Term Goal 5: Patient will complete toilet transfers with Max A.  Long Term Goals  Time Frame for Long Term Goals : 3-4 weeks  Long Term Goal 1: Setup with UB dressing.  Long Term Goal 2: Min A with LB dressing.  Long Term Goal 3: Min A with donning/doffing socks and shoes.  Long Term Goal 4: Min A with bathing hygiene.  Long Term Goal 5: Patient verbalize DME needs.  Long Term Goal 6: Mod A with toileting.  Long Term Goal 7: Mod A with toilet transfers.  Patient Goals   Patient goals : Return home independently.     Therapy Time   Individual Concurrent Group Co-treatment   Time In 1315         Time Out 1345         Minutes 30         Timed Code Treatment Minutes: 30 Minutes       Electronically signed by Shantelle Sheets OT on 7/9/2024 at 3:21 PM

## 2024-07-09 NOTE — PLAN OF CARE
[facility] INPATIENT REH[facility] TREATMENT PLAN      Kelsey Morejon    : 1955  [id number]  [facility]N: [id number]   PHYSICIAN:  Guy Stubbs MD  Primary Problem    Patient Active Problem List   Diagnosis    Coronary artery disease of native artery of native heart with stable angina pectoris (HCC)    Hypertension    Rheumatoid arteritis (HCC)    History of pulmonary fibrosis    History of DVT (deep vein thrombosis)    Thyroid disease    Intrinsic asthma    Hypercholesterolemia    Syncope, cardiogenic    Near syncope    Status post placement of implantable loop recorder    Left carotid bruit    S/P coronary artery stent placement    Chest pain    Pacemaker    Sinus node dysfunction (HCC)    Lumbar stenosis with neurogenic claudication    Spondylolisthesis of lumbar region    Leg swelling    Primary osteoarthritis of right knee    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)    Myalgia due to statin    Abnormal stress test    Discitis    MSSA bacteremia    Acute cystitis without hematuria    Closed wedge compression fracture of T2 vertebra with routine healing    S/P kyphoplasty    Acute midline thoracic back pain    Anemia    Severe malnutrition (HCC)    S/P cholecystectomy    Paraplegia ([facility])    Urinary incontinence       Rehabilitation Diagnosis:     S/P cholecystectomy [Z90.49]       ADMIT DATE:2024   CARE PLAN     NURSING:  Kelsey Morejon while on this unit will:     [x] Be continent of bowel and bladder      [x] Have an adequate number of bowel movements   [] Urinate with no urinary retention >300ml in bladder   [] Complete bladder protocol with asif removal   [] Maintain O2 SATs at ___%   [] Have pain managed while on ARU        [] Be pain free by discharge    [] Have no skin breakdown while on ARU   [x] Have improved skin integrity via wound measurements   [x] Have no signs/symptoms of infection at the wound site   [x] Be free from injury during hospitalization    [x] Complete education with

## 2024-07-09 NOTE — PROGRESS NOTES
Facility/Department: [facility] 8 REHAB UNIT  Occupational Therapy Initial Assessment     Name: Kelsey Morejon  : 1955  MRN: [id number]  Date of Service: 2024    Discharge Recommendations:  Continue to assess pending progress          Patient Diagnosis(es): There were no encounter diagnoses.  Past Medical History:  has a past medical history of CAD (coronary artery disease), Cellulitis, History of blood transfusion, History of DVT (deep vein thrombosis), History of neutropenia, History of pulmonary fibrosis, Hx of blood clots, Hypercholesterolemia, Hypertension, Intrinsic asthma, Pacemaker, Post op infection, Rheumatoid arteritis (HCC), Sinus node dysfunction (HCC), Staph aureus infection, Status post placement of implantable loop recorder, Syncope, and Thyroid disease.  Past Surgical History:  has a past surgical history that includes Diagnostic Cardiac Cath Lab Procedure; LEEP; Coronary angioplasty with stent (); Pacemaker insertion (2016); back surgery (2018); pr arthrp kne condyle&platu medial&lat compartments (Right, 2018); pr i&d deep absc bursa/hematoma thigh/knee region (Right, 10/23/2018); Cardiac catheterization (14  [facility]DL); Cardiac catheterization (1/26/15  [facility]); pacemaker placement; joint replacement; and Revision total knee arthroplasty (Right, 2018).    Treatment Diagnosis: Acute cholecystitis, s/p cholecystectomy    Assessment   Performance deficits / Impairments: Decreased functional mobility ;Decreased ADL status;Decreased strength;Decreased endurance;Decreased balance;Decreased high-level IADLs  Assessment: Patient requires increased assistance with ADLs due to decreased balance, strength and activity tolerance. She requires skilled OT to address the above deficits and return the patient home independently.  Treatment Diagnosis: Acute cholecystitis, s/p cholecystectomy  Prognosis: Good  Decision Making: Low Complexity  Activity Tolerance  Activity Tolerance: Patient

## 2024-07-10 ENCOUNTER — APPOINTMENT (OUTPATIENT)
Dept: GENERAL RADIOLOGY | Age: 69
End: 2024-07-10
Attending: PSYCHIATRY & NEUROLOGY
Payer: MEDICARE

## 2024-07-10 LAB
ACANTHOCYTES BLD QL SMEAR: ABNORMAL
ALBUMIN SERPL-MCNC: 2.7 G/DL (ref 3.5–5.2)
ALP SERPL-CCNC: 96 U/L (ref 35–104)
ALT SERPL-CCNC: 27 U/L (ref 5–33)
ANION GAP SERPL CALCULATED.3IONS-SCNC: 15 MMOL/L (ref 7–19)
AST SERPL-CCNC: 26 U/L (ref 5–32)
BACTERIA URNS QL MICRO: ABNORMAL /HPF
BASOPHILS # BLD: 0.1 K/UL (ref 0–0.2)
BASOPHILS NFR BLD: 0.9 % (ref 0–1)
BILIRUB SERPL-MCNC: 0.3 MG/DL (ref 0.2–1.2)
BILIRUB UR QL STRIP: NEGATIVE
BNP BLD-MCNC: 1285 PG/ML (ref 0–124)
BUN SERPL-MCNC: 14 MG/DL (ref 8–23)
CALCIUM SERPL-MCNC: 7.3 MG/DL (ref 8.8–10.2)
CHLORIDE SERPL-SCNC: 103 MMOL/L (ref 98–111)
CLARITY UR: ABNORMAL
CO2 SERPL-SCNC: 24 MMOL/L (ref 22–29)
COLOR UR: YELLOW
CREAT SERPL-MCNC: 0.9 MG/DL (ref 0.5–0.9)
CRYSTALS URNS MICRO: ABNORMAL /HPF
DACRYOCYTES BLD QL SMEAR: ABNORMAL
EOSINOPHIL # BLD: 0.4 K/UL (ref 0–0.6)
EOSINOPHIL NFR BLD: 6.7 % (ref 0–5)
EPI CELLS #/AREA URNS AUTO: 0 /HPF (ref 0–5)
ERYTHROCYTE [DISTWIDTH] IN BLOOD BY AUTOMATED COUNT: 21.2 % (ref 11.5–14.5)
GLUCOSE BLD-MCNC: 101 MG/DL (ref 70–99)
GLUCOSE BLD-MCNC: 154 MG/DL (ref 70–99)
GLUCOSE BLD-MCNC: 157 MG/DL (ref 70–99)
GLUCOSE BLD-MCNC: 160 MG/DL (ref 70–99)
GLUCOSE SERPL-MCNC: 144 MG/DL (ref 74–109)
GLUCOSE UR STRIP.AUTO-MCNC: NEGATIVE MG/DL
HCT VFR BLD AUTO: 30.6 % (ref 37–47)
HGB BLD-MCNC: 8.8 G/DL (ref 12–16)
HGB UR STRIP.AUTO-MCNC: ABNORMAL MG/L
HYALINE CASTS #/AREA URNS AUTO: 2 /HPF (ref 0–8)
HYPOCHROMIA BLD QL SMEAR: ABNORMAL
IMM GRANULOCYTES # BLD: 0.4 K/UL
IRON SATN MFR SERPL: 32 % (ref 14–50)
IRON SERPL-MCNC: 48 UG/DL (ref 37–145)
KETONES UR STRIP.AUTO-MCNC: NEGATIVE MG/DL
LEUKOCYTE ESTERASE UR QL STRIP.AUTO: ABNORMAL
LYMPHOCYTES # BLD: 1.5 K/UL (ref 1.1–4.5)
LYMPHOCYTES NFR BLD: 28.3 % (ref 20–40)
MAGNESIUM SERPL-MCNC: 1.4 MG/DL (ref 1.6–2.4)
MCH RBC QN AUTO: 27.9 PG (ref 27–31)
MCHC RBC AUTO-ENTMCNC: 28.8 G/DL (ref 33–37)
MCV RBC AUTO: 97.1 FL (ref 81–99)
MONOCYTES # BLD: 1 K/UL (ref 0–0.9)
MONOCYTES NFR BLD: 18 % (ref 0–10)
NEUTROPHILS # BLD: 2.1 K/UL (ref 1.5–7.5)
NEUTS SEG NFR BLD: 39.2 % (ref 50–65)
NITRITE UR QL STRIP.AUTO: POSITIVE
PERFORMED ON: ABNORMAL
PH UR STRIP.AUTO: 6 [PH] (ref 5–8)
PLATELET # BLD AUTO: 225 K/UL (ref 130–400)
PLATELET SLIDE REVIEW: ADEQUATE
PMV BLD AUTO: 11.6 FL (ref 9.4–12.3)
POTASSIUM SERPL-SCNC: 4.5 MMOL/L (ref 3.5–5)
PROT SERPL-MCNC: 5.1 G/DL (ref 6.6–8.7)
PROT UR STRIP.AUTO-MCNC: ABNORMAL MG/DL
RBC # BLD AUTO: 3.15 M/UL (ref 4.2–5.4)
RBC #/AREA URNS AUTO: 47 /HPF (ref 0–4)
SCHISTOCYTES BLD QL SMEAR: ABNORMAL
SODIUM SERPL-SCNC: 142 MMOL/L (ref 136–145)
SP GR UR STRIP.AUTO: 1.01 (ref 1–1.03)
T4 FREE SERPL-MCNC: 1.3 NG/DL (ref 0.93–1.7)
T4 SERPL-MCNC: 7.2 UG/DL (ref 4.5–11.7)
TIBC SERPL-MCNC: 151 UG/DL (ref 250–400)
TROPONIN, HIGH SENSITIVITY: 41 NG/L (ref 0–14)
TROPONIN, HIGH SENSITIVITY: 42 NG/L (ref 0–14)
TSH SERPL DL<=0.005 MIU/L-ACNC: 6.91 UIU/ML (ref 0.27–4.2)
UROBILINOGEN UR STRIP.AUTO-MCNC: 0.2 E.U./DL
WBC # BLD AUTO: 5.4 K/UL (ref 4.8–10.8)
WBC #/AREA URNS AUTO: 434 /HPF (ref 0–5)

## 2024-07-10 PROCEDURE — 81001 URINALYSIS AUTO W/SCOPE: CPT

## 2024-07-10 PROCEDURE — 84484 ASSAY OF TROPONIN QUANT: CPT

## 2024-07-10 PROCEDURE — 87077 CULTURE AEROBIC IDENTIFY: CPT

## 2024-07-10 PROCEDURE — 36415 COLL VENOUS BLD VENIPUNCTURE: CPT

## 2024-07-10 PROCEDURE — 83540 ASSAY OF IRON: CPT

## 2024-07-10 PROCEDURE — 93005 ELECTROCARDIOGRAM TRACING: CPT

## 2024-07-10 PROCEDURE — 82962 GLUCOSE BLOOD TEST: CPT

## 2024-07-10 PROCEDURE — 80053 COMPREHEN METABOLIC PANEL: CPT

## 2024-07-10 PROCEDURE — 84443 ASSAY THYROID STIM HORMONE: CPT

## 2024-07-10 PROCEDURE — 97116 GAIT TRAINING THERAPY: CPT

## 2024-07-10 PROCEDURE — 2580000003 HC RX 258

## 2024-07-10 PROCEDURE — 1180000000 HC REHAB R&B

## 2024-07-10 PROCEDURE — 51798 US URINE CAPACITY MEASURE: CPT

## 2024-07-10 PROCEDURE — 83550 IRON BINDING TEST: CPT

## 2024-07-10 PROCEDURE — 6370000000 HC RX 637 (ALT 250 FOR IP): Performed by: PSYCHIATRY & NEUROLOGY

## 2024-07-10 PROCEDURE — 85025 COMPLETE CBC W/AUTO DIFF WBC: CPT

## 2024-07-10 PROCEDURE — 6360000002 HC RX W HCPCS

## 2024-07-10 PROCEDURE — 71045 X-RAY EXAM CHEST 1 VIEW: CPT

## 2024-07-10 PROCEDURE — 83880 ASSAY OF NATRIURETIC PEPTIDE: CPT

## 2024-07-10 PROCEDURE — 83735 ASSAY OF MAGNESIUM: CPT

## 2024-07-10 PROCEDURE — 97110 THERAPEUTIC EXERCISES: CPT

## 2024-07-10 PROCEDURE — 99232 SBSQ HOSP IP/OBS MODERATE 35: CPT | Performed by: PSYCHIATRY & NEUROLOGY

## 2024-07-10 PROCEDURE — 87186 SC STD MICRODIL/AGAR DIL: CPT

## 2024-07-10 PROCEDURE — 87106 FUNGI IDENTIFICATION YEAST: CPT

## 2024-07-10 PROCEDURE — 97530 THERAPEUTIC ACTIVITIES: CPT

## 2024-07-10 PROCEDURE — 84439 ASSAY OF FREE THYROXINE: CPT

## 2024-07-10 PROCEDURE — 87086 URINE CULTURE/COLONY COUNT: CPT

## 2024-07-10 RX ORDER — 0.9 % SODIUM CHLORIDE 0.9 %
1000 INTRAVENOUS SOLUTION INTRAVENOUS ONCE
Status: COMPLETED | OUTPATIENT
Start: 2024-07-10 | End: 2024-07-10

## 2024-07-10 RX ORDER — MAGNESIUM SULFATE IN WATER 40 MG/ML
4000 INJECTION, SOLUTION INTRAVENOUS ONCE
Status: COMPLETED | OUTPATIENT
Start: 2024-07-10 | End: 2024-07-10

## 2024-07-10 RX ORDER — ZINC OXIDE 20 %
OINTMENT (GRAM) TOPICAL 4 TIMES DAILY PRN
Status: DISCONTINUED | OUTPATIENT
Start: 2024-07-10 | End: 2024-07-26 | Stop reason: HOSPADM

## 2024-07-10 RX ADMIN — MICONAZOLE NITRATE: 2 POWDER TOPICAL at 10:11

## 2024-07-10 RX ADMIN — ISOSORBIDE MONONITRATE 60 MG: 30 TABLET, EXTENDED RELEASE ORAL at 08:50

## 2024-07-10 RX ADMIN — FERROUS SULFATE TAB 325 MG (65 MG ELEMENTAL FE) 325 MG: 325 (65 FE) TAB at 08:51

## 2024-07-10 RX ADMIN — LEFLUNOMIDE 20 MG: 20 TABLET ORAL at 20:41

## 2024-07-10 RX ADMIN — TRAMADOL HYDROCHLORIDE 50 MG: 50 TABLET ORAL at 10:42

## 2024-07-10 RX ADMIN — METHENAMINE HIPPURATE 1 G: 1000 TABLET ORAL at 17:12

## 2024-07-10 RX ADMIN — Medication 1 TABLET: at 08:51

## 2024-07-10 RX ADMIN — APIXABAN 5 MG: 5 TABLET, FILM COATED ORAL at 20:41

## 2024-07-10 RX ADMIN — LOSARTAN POTASSIUM 50 MG: 50 TABLET, FILM COATED ORAL at 08:50

## 2024-07-10 RX ADMIN — APIXABAN 5 MG: 5 TABLET, FILM COATED ORAL at 08:50

## 2024-07-10 RX ADMIN — COLCHICINE 0.6 MG: 0.6 TABLET, FILM COATED ORAL at 08:50

## 2024-07-10 RX ADMIN — FOLIC ACID 1 MG: 1 TABLET ORAL at 08:50

## 2024-07-10 RX ADMIN — SODIUM CHLORIDE 1000 ML: 9 INJECTION, SOLUTION INTRAVENOUS at 12:36

## 2024-07-10 RX ADMIN — ASPIRIN 81 MG: 81 TABLET, COATED ORAL at 08:50

## 2024-07-10 RX ADMIN — CEFEPIME 2000 MG: 2 INJECTION, POWDER, FOR SOLUTION INTRAVENOUS at 17:58

## 2024-07-10 RX ADMIN — LEVOTHYROXINE SODIUM 75 MCG: 75 TABLET ORAL at 05:33

## 2024-07-10 RX ADMIN — SPIRONOLACTONE 25 MG: 25 TABLET ORAL at 08:51

## 2024-07-10 RX ADMIN — Medication 500 MG: at 08:50

## 2024-07-10 RX ADMIN — CARVEDILOL 25 MG: 12.5 TABLET, FILM COATED ORAL at 08:50

## 2024-07-10 RX ADMIN — METHENAMINE HIPPURATE 1 G: 1000 TABLET ORAL at 08:51

## 2024-07-10 RX ADMIN — PREDNISONE 5 MG: 5 TABLET ORAL at 08:51

## 2024-07-10 RX ADMIN — PANTOPRAZOLE SODIUM 40 MG: 40 TABLET, DELAYED RELEASE ORAL at 08:50

## 2024-07-10 RX ADMIN — MAGNESIUM SULFATE HEPTAHYDRATE 4000 MG: 40 INJECTION, SOLUTION INTRAVENOUS at 15:49

## 2024-07-10 RX ADMIN — DULOXETINE HYDROCHLORIDE 60 MG: 60 CAPSULE, DELAYED RELEASE ORAL at 08:50

## 2024-07-10 RX ADMIN — FUROSEMIDE 40 MG: 40 TABLET ORAL at 08:50

## 2024-07-10 RX ADMIN — ALLOPURINOL 100 MG: 100 TABLET ORAL at 08:51

## 2024-07-10 ASSESSMENT — PAIN DESCRIPTION - LOCATION: LOCATION: NECK

## 2024-07-10 ASSESSMENT — PAIN SCALES - GENERAL
PAINLEVEL_OUTOF10: 2
PAINLEVEL_OUTOF10: 4

## 2024-07-10 ASSESSMENT — PAIN - FUNCTIONAL ASSESSMENT: PAIN_FUNCTIONAL_ASSESSMENT: ACTIVITIES ARE NOT PREVENTED

## 2024-07-10 ASSESSMENT — PAIN DESCRIPTION - DESCRIPTORS: DESCRIPTORS: ACHING

## 2024-07-10 ASSESSMENT — PAIN DESCRIPTION - ORIENTATION: ORIENTATION: POSTERIOR

## 2024-07-10 NOTE — PROGRESS NOTES
Pharmacy Adjustment per [facility] protocol    Kelsey Morejon is a 68 y.o. female. Pharmacy has adjusted medications per [facility] protocol.    Recent Labs     07/09/24  0746 07/10/24  1107   BUN 17 14       Recent Labs     07/09/24  0746 07/10/24  1107   CREATININE 0.8 0.9       Estimated Creatinine Clearance: 66 mL/min (based on SCr of 0.9 mg/dL).    Height:   Ht Readings from Last 1 Encounters:   07/09/24 1.676 m (5' 6\")     Weight:  Wt Readings from Last 1 Encounters:   07/08/24 91.3 kg (201 lb 4.5 oz)         Plan: Adjust the following medications based on [facility] protocol:           Cefepime to 2000 mg IV once over 30 minutes followed by 2000 mg IV every 12 hours extended infusion over 240 minutes     Electronically signed by Carrington Chan RPH on 7/10/2024 at 4:04 PM

## 2024-07-10 NOTE — PROGRESS NOTES
Received orders to have pacemaker interrogated. Spoke [employer] answering service and was advised a rep would be contacted.

## 2024-07-10 NOTE — CONSULTS
intake.  Based on her clinical picture, she appears intravascularly depleted.  Lab work unremarkable with the exception of magnesium 1.4, BNP 1,285 (most recently 5,126 on 5/24 @ [facility]), troponin 41 with repeat pending, and hemoglobin 8.8.  She does have a history of MAI and is on iron supplementation.  EKG showed NSR without ischemic changes.  Chest x-ray showed no acute cardiopulmonary abnormality. UA is pending. She denies CP, SOB, fever, chills, abd pain, or diarrhea. She has some nausea intermittently. She does have some regurgitation of medications/food at times and has recently had her esophagus stretched; SLP to evaluate. Will plan to rehydrate with fluid bolus and hold lasix & spironolactone. Holding coreg & imdur. Will give 4g mag and re-evaluate tomorrow morning. Pacemaker interrogation pending.     Past Medical History:        Diagnosis Date    CAD (coronary artery disease)     S/p Stent to right coronary artery 9/2010. S/p myocardial infarction May 2013.    Cellulitis     LT LEG IN PAST    History of blood transfusion     History of DVT (deep vein thrombosis)     LT    History of neutropenia     History of pulmonary fibrosis     Hx of blood clots 1992    LT LEG    Hypercholesterolemia     Hypertension     Intrinsic asthma     Pacemaker 11/17/2016    Post op infection     \"right knee leaking after my replacement\"    Rheumatoid arteritis (HCC)     Sinus node dysfunction (HCC)     Staph aureus infection     Status post placement of implantable loop recorder 2/13/15, 3/30/15    2/19/15  removed, infection    Syncope 2/11/2015    Thyroid disease     HYPOTHYROIDISM       Past Surgical History:        Procedure Laterality Date    BACK SURGERY  01/01/2018    CARDIAC CATHETERIZATION  5/14/14  MDL    normal LV function. EF 60%    CARDIAC CATHETERIZATION  1/26/15  MDL    EF 60%, Left circ has long 80-90% in small 2mm vessel, diffuse disease in RCA    CORONARY ANGIOPLASTY WITH STENT PLACEMENT  2014    DIAGNOSTIC    GLUCOSE 86 144*     Recent Labs     07/09/24  0746 07/10/24  1107   AST 28 26   ALT 26 27   BILITOT 0.4 0.3   ALKPHOS 89 96       Troponin T: No results for input(s): \"TROPONINI\" in the last 72 hours.    Pro-BNP: No results for input(s): \"BNP\" in the last 72 hours.    INR: No results for input(s): \"INR\" in the last 72 hours.    UA:No results for input(s): \"NITRITE\", \"COLORU\", \"PHUR\", \"LABCAST\", \"WBCUA\", \"RBCUA\", \"MUCUS\", \"TRICHOMONAS\", \"YEAST\", \"BACTERIA\", \"CLARITYU\", \"SPECGRAV\", \"LEUKOCYTESUR\", \"UROBILINOGEN\", \"BILIRUBINUR\", \"BLOODU\", \"GLUCOSEU\", \"AMORPHOUS\" in the last 72 hours.    Invalid input(s): \"KETONESU\"    A1C: No results for input(s): \"LABA1C\" in the last 72 hours.    ABG:No results for input(s): \"PHART\", \"ANQ6THZ\", \"PO2ART\", \"RVT5SEV\", \"BEART\", \"HGBAE\", \"R3SQOCEX\", \"CARBOXHGBART\" in the last 72 hours.    Rad:   No results found.    Culture Results:    No results for input(s): \"CXSURG\" in the last 720 hours.    Blood Culture Recent: No results for input(s): \"BC\" in the last 720 hours.    No results for input(s): \"BC\", \"BLOODCULT2\", \"ORG\" in the last 72 hours.    Assessment/Plan   Syncope  -Likely orthostasis secondary to intravascular depletion in the setting of decreased oral intake/appetite  -Profoundly orthostatic with drop in SBP from 107 to 69  -Hold lasix, spironolactone  -Hold imdur, coreg  -1L fluid bolus  -Interrogate pacemaker  -EKG without ischemic changes  -Trop 41, repeat pending  -BG normal    Hypomagnesemia  -Mag 1.4, replace with 4g  -Likely secondary to inadequate intake  -Recheck in AM    MAI  -Hgb 8.8  -Iron studies pending  -Continue ferrous sulfate    HFpEF  -Echo 6/13 @ [facility]: EF 61-65% with grade I DD  -Noted trace edema to BLE, BNP 1,100  -Holding diuretics currently    Recent esophageal dilation  -SLP eval    S/P cholecystectomy  -No complications reported    Coronary artery disease of native artery of native heart with stable angina pectoris (HCC)  -s/p PCI to RCA in 2010 and LAD

## 2024-07-10 NOTE — THERAPY DISCHARGE NOTE
Acute Care - Physical Therapy Discharge Summary  [facility]       Patient Name: Tawny Shin  : 1955  MRN: [id number]    Today's Date: 7/10/2024                 Admit Date: 2024      PT Recommendation and Plan    Visit Dx:    ICD-10-CM ICD-9-CM   1. Acute UTI (urinary tract infection)  N39.0 599.0   2. Altered mental status, unspecified altered mental status type  R41.82 780.97   3. Open wound of right buttock, sequela  S31.819S 906.0   4. Cyst of buttocks  L72.9 706.2   5. Dysphagia, unspecified type  R13.10 787.20   6. Impaired mobility [Z74.09]  Z74.09 799.89   7. Hydronephrosis with urinary obstruction due to ureteral calculus  N13.2 592.1     591   8. Acute cholecystitis  K81.0 575.0   9. Acute congestive heart failure, unspecified heart failure type  I50.9 428.0   10. Acute gout, unspecified cause, unspecified site  M10.9 274.01        Outcome Measures       Row Name 24 1400 24 1100          How much help from another person do you currently need...    Turning from your back to your side while in flat bed without using bedrails? -- 3  -KJ     Moving from lying on back to sitting on the side of a flat bed without bedrails? -- 3  -KJ     Moving to and from a bed to a chair (including a wheelchair)? -- 2  -KJ     Standing up from a chair using your arms (e.g., wheelchair, bedside chair)? -- 1  -KJ     Climbing 3-5 steps with a railing? -- 1  -KJ     To walk in hospital room? -- 1  -KJ     AM-PAC 6 Clicks Score (PT) -- 11  -KJ     Highest Level of Mobility Goal -- 4 --> Transfer to chair/commode  -KJ        How much help from another is currently needed...    Putting on and taking off regular lower body clothing? 2  -TS --     Bathing (including washing, rinsing, and drying) 2  -TS --     Toileting (which includes using toilet bed pan or urinal) 2  -TS --     Putting on and taking off regular upper body clothing 3  -TS --     Taking care of personal grooming (such as brushing teeth) 3   -TS --     Eating meals 4  -TS --     AM-PAC 6 Clicks Score (OT) 16  -TS --        Functional Assessment    Outcome Measure Options -- AM-PAC 6 Clicks Basic Mobility (PT)  -AGUSTINA               User Key  (r) = Recorded By, (t) = Taken By, (c) = Cosigned By      Initials Name Provider Type    Emilee Gloria, PTA Physical Therapist Assistant    Carolee Roger COTA Occupational Therapist Assistant                         PT Rehab Goals       Row Name 07/10/24 0700             Bed Mobility Goal 1 (PT)    Activity/Assistive Device (Bed Mobility Goal 1, PT) bed mobility activities, all  -AB      [place] Level/Cues Needed (Bed Mobility Goal 1, PT) modified independence  -AB      Time Frame (Bed Mobility Goal 1, PT) by discharge  -AB      Progress/Outcomes (Bed Mobility Goal 1, PT) goal not met  -AB         Transfer Goal 1 (PT)    Activity/Assistive Device (Transfer Goal 1, PT) sit-to-stand/stand-to-sit;bed-to-chair/chair-to-bed;walker, rolling  -AB      [place] Level/Cues Needed (Transfer Goal 1, PT) minimum assist (75% or more patient effort)  -AB      Time Frame (Transfer Goal 1, PT) by discharge  -AB      Progress/Outcome (Transfer Goal 1, PT) goal not met  -AB         Gait Training Goal 1 (PT)    Activity/Assistive Device (Gait Training Goal 1, PT) gait (walking locomotion);assistive device use;decrease fall risk;increase endurance/gait distance;improve balance and speed;walker, rolling  -AB      [place] Level (Gait Training Goal 1, PT) minimum assist (75% or more patient effort)  -AB      Distance (Gait Training Goal 1, PT) 10 feet without knees buckling  -AB      Time Frame (Gait Training Goal 1, PT) by discharge  -AB      Progress/Outcome (Gait Training Goal 1, PT) goal not met  -AB         Problem Specific Goal 1 (PT)    Problem Specific Goal 1 (PT) Pt will perform static standing balance with RW and SBA for 3 minutes.  -AB      Time Frame (Problem Specific Goal 1, PT) by discharge  -AB       Progress/Outcome (Problem Specific Goal 1, PT) goal not met  -AB                User Key  (r) = Recorded By, (t) = Taken By, (c) = Cosigned By      Initials Name Provider Type Discipline    Kasie Carlos, PTA Physical Therapist Assistant PT                        PT Discharge Summary  Anticipated Discharge Disposition (PT): home with 24/7 care, home with home health, skilled nursing facility  Reason for Discharge: Discharge from facility  Outcomes Achieved: Refer to plan of care for updates on goals achieved  Discharge Destination: Inpatient rehabilitation facility      Kasie Perkins PTA   7/10/2024

## 2024-07-10 NOTE — PROGRESS NOTES
07/10/24 1110   Restrictions/Precautions   Restrictions/Precautions Fall Risk;Isolation   General   Chart Reviewed Yes   Additional Pertinent Hx RA, pacemaker, CAD, MI, DVT, CHF, neurogenic bladder, HTN, charcot's joint, psoriasis vulgaris, osteoporosis, hypothyroidism, cervical spinal stenosis   Diagnosis Acute cholecystitis   Transfers   Sit to Stand Maximum Assistance;2 Person Assistance   Stand to Sit Maximum Assistance;2 Person Assistance   Bed to Chair Maximum assistance;2 Person Assistance  (kamlesh steady)   Comment first STS transfer min assist; last STS transfer max assist x2 due to fatigue   Assessment   Assessment Co-tx w/ OT. Pt used kamlesh steady for bed<> wheelchair transfer. Pt out of bed to switch beds.Orthostatic BP checked in wheelchair and kamlesh steady; vitals below therapeutic range, nursing notified. Pt went unresponsive while in kamlesh steady; rapid called. Nursing present.   History Acute cholecystitis   PT Individual Minutes   Time In 1100   Time Out 1200   Minutes 60        contact guard

## 2024-07-10 NOTE — PROGRESS NOTES
Alerted to patients room by staff assist alarm. Upon entering room, patient noted to be slumped over with 3 therapist supporting her while attempting to assist patient back to bed. Patient appeared lethargic and unable to follow command. Rapid response called. Bp at this time 69/40, pulse 80. MD, MELISSA, lab, EKG, charge nurse in room at this time with new orders received. Dr. Stubbs and clinical house aware.

## 2024-07-10 NOTE — PROGRESS NOTES
Patient:   Kelsey Morejon  MR#:    [id number]   Room:    [facility]/815-02   YOB: 1955  Date of Progress Note: 7/10/2024  Time of Note                           7:42 AM  Consulting Physician:   Guy Stubbs M.D.  Attending Physician:  Guy Stubbs MD     Chief complaint Status post cholecystectomy     S:This 68 y.o. 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 Patient admitted on 6/13/2024 with AMS. She was found to have UTI  and left ureteral calculus with obstruction and acute hypoxic respiratory failure. She has been treated with Cefepime. Lithotripsy performed on 6/21/2024. GI was consulted on 6/21/2024 for abdominal pain. GB US showed cholelithiasis with small stones and sludge. HIDA scan done showed Non visualization of the gallbladder and could not rule out cystic duct obstruction.  On 07/03/24 she underwent a CHOLECYSTECTOMY LAPAROSCOPIC WITH INTRAOPERATIVE CHOLANGIOGRAM by   Lexa Waddell MD. She tolerated procedure well.   She does have a wound on her R buttock from cyst removal by Dr. Waddell. The wound has opened up and has been dressed w/ wet-to-dry dressings by Home Health. Wound measures 1.2cm x 2 cm x 2.5. Undermining circumferentially reaching 5cm at 4 o'clock. Subcutaneous tissue, wound bed. Small amount of serosanguineous drainage. Aixa wound area is slight erythema but blanchable.  She was complaining about some difficulty swallowing. Speech evaluated her. She is now tolerating a regular diet, thin liquids with meds whole in applesauce.   She is now medically stable and is participating with therapy. She is ready to begin rehab with goal of returning home after rehab dc with support from her caregivers and family.  No new complaints overnight.  Feeling well overall.  Comfortable.    REVIEW OF SYSTEMS:  Constitutional: No fevers No chills  Neck:No stiffness  Respiratory: No  shortness of breath  Cardiovascular: No chest pain No palpitations  Gastrointestinal: No significant abdominal pain    Genitourinary: No Dysuria  Neurological: No headache, no confusion    Past Medical History:      Diagnosis Date    CAD (coronary artery disease)     S/p Stent to right coronary artery 9/2010. S/p myocardial infarction May 2013.    Cellulitis     LT LEG IN PAST    History of blood transfusion     History of DVT (deep vein thrombosis)     LT    History of neutropenia     History of pulmonary fibrosis     Hx of blood clots 1992    LT LEG    Hypercholesterolemia     Hypertension     Intrinsic asthma     Pacemaker 11/17/2016    Post op infection     \"right knee leaking after my replacement\"    Rheumatoid arteritis (HCC)     Sinus node dysfunction (HCC)     Staph aureus infection     Status post placement of implantable loop recorder 2/13/15, 3/30/15    2/19/15  removed, infection    Syncope 2/11/2015    Thyroid disease     HYPOTHYROIDISM       Past Surgical History:      Procedure Laterality Date    BACK SURGERY  01/01/2018    CARDIAC CATHETERIZATION  5/14/14  MDL    normal LV function. EF 60%    CARDIAC CATHETERIZATION  1/26/15  MDL    EF 60%, Left circ has long 80-90% in small 2mm vessel, diffuse disease in RCA    CORONARY ANGIOPLASTY WITH STENT PLACEMENT  2014    DIAGNOSTIC CARDIAC CATH LAB PROCEDURE      Right coronary artery stent. 9/2010    JOINT REPLACEMENT      LEEP      PACEMAKER INSERTION  11/17/2016    Dr Hernández; [employer]    PACEMAKER PLACEMENT      [employer]    KY INCIS/DRAIN THIGH/KNEE ABSCESS,DEEP Right 10/23/2018    KNEE INCISION AND DRAINAGE performed by Alfa Singh MD at [facility] OR    [place] TOTAL KNEE ARTHROPLASTY Right 9/11/2018    KNEE TOTAL ARTHROPLASTY performed by Alfa Singh MD at [facility] OR    REVISION TOTAL KNEE ARTHROPLASTY Right 12/6/2018    KNEE EXPLANT AND PLACEMENT OF ANTIBIOTIC SPACER performed by Alfa Singh MD at [facility] OR       Medications in

## 2024-07-10 NOTE — PROGRESS NOTES
Facility/Department: [facility] 8 REHAB UNIT  Occupational Therapy     Name: Kelsey Morejon  : 1955  MRN: [id number]  Date of Service: 7/10/2024    Discharge Recommendations:  Continue to assess pending progress    Patient Diagnosis(es): There were no encounter diagnoses.  Past Medical History:  has a past medical history of CAD (coronary artery disease), Cellulitis, History of blood transfusion, History of DVT (deep vein thrombosis), History of neutropenia, History of pulmonary fibrosis, Hx of blood clots, Hypercholesterolemia, Hypertension, Intrinsic asthma, Pacemaker, Post op infection, Rheumatoid arteritis (HCC), Sinus node dysfunction (HCC), Staph aureus infection, Status post placement of implantable loop recorder, Syncope, and Thyroid disease.  Past Surgical History:  has a past surgical history that includes Diagnostic Cardiac Cath Lab Procedure; LEEP; Coronary angioplasty with stent (); Pacemaker insertion (2016); back surgery (2018); pr arthrp kne condyle&platu medial&lat compartments (Right, 2018); pr i&d deep absc bursa/hematoma thigh/knee region (Right, 10/23/2018); Cardiac catheterization (14  MDL); Cardiac catheterization (1/26/15  MDL); pacemaker placement; joint replacement; and Revision total knee arthroplasty (Right, 2018).    Treatment Diagnosis: Acute cholecystitis, s/p cholecystectomy    Assessment   Performance deficits / Impairments: Decreased functional mobility ;Decreased ADL status;Decreased strength;Decreased endurance;Decreased balance;Decreased high-level IADLs  Assessment: Pt. orthostatic BP taken per request by nursing. Pt. transferred from bed to w/c for specialty mattress to be placed in room. BP taken in [facility] prior to transfering to bed, before able to complete stand to sit on bed, pt. became unresponsive. Rapid response called, required assistance x3 to transfer BTB.  Treatment Diagnosis: Acute cholecystitis, s/p cholecystectomy  Activity  PER MINUTE  Pulse Standin PER MINUTE    Safety Devices  Type of Devices: Bed alarm in place;Call light within reach    Bed mobility  Rolling to Left: Minimal assistance  Rolling to Right: Minimal assistance  Supine to Sit: Moderate assistance;Maximum assistance  Sit to Supine: Dependent/Total (d/t pt. being unresponsive.)  Transfers  Sit to stand: Dependent/Total (Using kamlesh stedy.)  Stand to sit: Dependent/Total (Using kamlesh stedy.)  Transfer Comments: Min A x2 transfer from bed to kamlesh stedy, Max A x2 from w/c to kamlesh stedy. Min A x2 for kamlesh stedy to w/c, required total assist x3 for kamlesh stedy to bed d/t pt. becoming unresponsive.    Education  Education Given To: Patient  Education Provided: Mobility Training, Transfer Training  Education Method: Demonstration, Verbal  Barriers to Learning: None  Education Outcome: Verbalized understanding, Demonstrated understanding    Balance  Sitting Balance: Stand by assistance  Standing Balance: Dependent/Total (Kamlesh stedy.)      Goals  Short Term Goals  Time Frame for Short Term Goals: 2 weeks  Short Term Goal 1: Mod A with LB dressing, AE PRN.  Short Term Goal 2: Mod A with donning/doffing socks and shoes, AE PRN.  Short Term Goal 3: Mod A with bathing hygiene.  Short Term Goal 4: Patient will complete toileting with Max A.  Short Term Goal 5: Patient will complete toilet transfers with Max A.  Long Term Goals  Time Frame for Long Term Goals : 3-4 weeks  Long Term Goal 1: Setup with UB dressing.  Long Term Goal 2: Min A with LB dressing.  Long Term Goal 3: Min A with donning/doffing socks and shoes.  Long Term Goal 4: Min A with bathing hygiene.  Long Term Goal 5: Patient verbalize DME needs.  Long Term Goal 6: Mod A with toileting.  Long Term Goal 7: Mod A with toilet transfers.  Patient Goals   Patient goals : Return home independently.    Therapy Time   Individual Concurrent Group Co-treatment   Time In       1100 (Cotx with PT to address safety during transfers.)

## 2024-07-10 NOTE — PROGRESS NOTES
Physical Therapy     07/10/24 1100   Bed mobility   Rolling to Left Moderate assistance   Supine to Sit Maximum assistance;Moderate assistance   Sit to Supine Maximum assistance;Moderate assistance   Bed Mobility Comments pt. needs max assist with getting legs off the bed but less help getting trunk up at this date.   Transfers   Sit to Stand Maximum Assistance   Stand to Sit Maximum Assistance   Bed to Chair Maximum assistance   Squat Pivot Transfers Moderate Assistance;Maximum Assistance   Comment pt. exhibits better execution of squat pivot transfer at this date.   Ambulation   Surface Level tile   Device Parallel Bars   Other Apparatus Wheelchair follow   Assistance Maximum assistance   Quality of Gait mix of reciprocal and step to pattern. Asssistance with moving LE forward.   Gait Deviations Slow Vania;Decreased step length;Decreased step height   Distance 5'   Comments pt. needs assistance with getting legs to move forward. Need to block both knees when coming to stand. tactile cues on ischial tuberosity to stand up straight along with verbal cues to tuck butt in.   PT Exercises   Exercise Treatment seated marches x10 each leg   Assessment   Assessment Co-TX with OT. Pt. exhibits better ability to walk in parallel bars with max. assist at this date. pt. does well with squat pivot to the right from bed to W/C. Need to continue working on LLE strength. Struggles to recruit hip flexors and cannot move L hip through full ROM.     Electronically signed by GUEVARA Cabrera on 7/10/2024 at 11:09 AM

## 2024-07-11 LAB
ALBUMIN SERPL-MCNC: 2.8 G/DL (ref 3.5–5.2)
ALP SERPL-CCNC: 103 U/L (ref 35–104)
ALT SERPL-CCNC: 28 U/L (ref 5–33)
ANION GAP SERPL CALCULATED.3IONS-SCNC: 13 MMOL/L (ref 7–19)
AST SERPL-CCNC: 30 U/L (ref 5–32)
BASOPHILS # BLD: 0.1 K/UL (ref 0–0.2)
BASOPHILS NFR BLD: 1.2 % (ref 0–1)
BILIRUB SERPL-MCNC: 0.5 MG/DL (ref 0.2–1.2)
BUN SERPL-MCNC: 15 MG/DL (ref 8–23)
CALCIUM SERPL-MCNC: 8.4 MG/DL (ref 8.8–10.2)
CHLORIDE SERPL-SCNC: 101 MMOL/L (ref 98–111)
CO2 SERPL-SCNC: 23 MMOL/L (ref 22–29)
CREAT SERPL-MCNC: 0.9 MG/DL (ref 0.5–0.9)
EKG P AXIS: -10 DEGREES
EKG P-R INTERVAL: 138 MS
EKG Q-T INTERVAL: 366 MS
EKG QRS DURATION: 80 MS
EKG QTC CALCULATION (BAZETT): 399 MS
EKG T AXIS: 70 DEGREES
EOSINOPHIL # BLD: 0.2 K/UL (ref 0–0.6)
EOSINOPHIL NFR BLD: 3.1 % (ref 0–5)
ERYTHROCYTE [DISTWIDTH] IN BLOOD BY AUTOMATED COUNT: 20.9 % (ref 11.5–14.5)
GLUCOSE BLD-MCNC: 124 MG/DL (ref 70–99)
GLUCOSE BLD-MCNC: 134 MG/DL (ref 70–99)
GLUCOSE BLD-MCNC: 143 MG/DL (ref 70–99)
GLUCOSE BLD-MCNC: 169 MG/DL (ref 70–99)
GLUCOSE SERPL-MCNC: 117 MG/DL (ref 74–109)
HCT VFR BLD AUTO: 29.9 % (ref 37–47)
HGB BLD-MCNC: 9 G/DL (ref 12–16)
IMM GRANULOCYTES # BLD: 0.4 K/UL
LYMPHOCYTES # BLD: 0.8 K/UL (ref 1.1–4.5)
LYMPHOCYTES NFR BLD: 12.8 % (ref 20–40)
MAGNESIUM SERPL-MCNC: 2.3 MG/DL (ref 1.6–2.4)
MCH RBC QN AUTO: 28.8 PG (ref 27–31)
MCHC RBC AUTO-ENTMCNC: 30.1 G/DL (ref 33–37)
MCV RBC AUTO: 95.8 FL (ref 81–99)
MONOCYTES # BLD: 1.1 K/UL (ref 0–0.9)
MONOCYTES NFR BLD: 17.9 % (ref 0–10)
NEUTROPHILS # BLD: 3.5 K/UL (ref 1.5–7.5)
NEUTS SEG NFR BLD: 59 % (ref 50–65)
PERFORMED ON: ABNORMAL
PLATELET # BLD AUTO: 200 K/UL (ref 130–400)
PMV BLD AUTO: 11.5 FL (ref 9.4–12.3)
POTASSIUM SERPL-SCNC: 4.5 MMOL/L (ref 3.5–5)
PROT SERPL-MCNC: 5.5 G/DL (ref 6.6–8.7)
RBC # BLD AUTO: 3.12 M/UL (ref 4.2–5.4)
REASON FOR REJECTION: NORMAL
REJECTED TEST: NORMAL
SODIUM SERPL-SCNC: 137 MMOL/L (ref 136–145)
WBC # BLD AUTO: 5.9 K/UL (ref 4.8–10.8)

## 2024-07-11 PROCEDURE — 80053 COMPREHEN METABOLIC PANEL: CPT

## 2024-07-11 PROCEDURE — 97110 THERAPEUTIC EXERCISES: CPT

## 2024-07-11 PROCEDURE — 97535 SELF CARE MNGMENT TRAINING: CPT

## 2024-07-11 PROCEDURE — 92610 EVALUATE SWALLOWING FUNCTION: CPT

## 2024-07-11 PROCEDURE — 6370000000 HC RX 637 (ALT 250 FOR IP): Performed by: PSYCHIATRY & NEUROLOGY

## 2024-07-11 PROCEDURE — 99232 SBSQ HOSP IP/OBS MODERATE 35: CPT | Performed by: PSYCHIATRY & NEUROLOGY

## 2024-07-11 PROCEDURE — 6370000000 HC RX 637 (ALT 250 FOR IP)

## 2024-07-11 PROCEDURE — 6360000002 HC RX W HCPCS

## 2024-07-11 PROCEDURE — 93010 ELECTROCARDIOGRAM REPORT: CPT | Performed by: INTERNAL MEDICINE

## 2024-07-11 PROCEDURE — 36415 COLL VENOUS BLD VENIPUNCTURE: CPT

## 2024-07-11 PROCEDURE — 1180000000 HC REHAB R&B

## 2024-07-11 PROCEDURE — 97530 THERAPEUTIC ACTIVITIES: CPT

## 2024-07-11 PROCEDURE — 82962 GLUCOSE BLOOD TEST: CPT

## 2024-07-11 PROCEDURE — 2500000003 HC RX 250 WO HCPCS: Performed by: PSYCHIATRY & NEUROLOGY

## 2024-07-11 PROCEDURE — 94760 N-INVAS EAR/PLS OXIMETRY 1: CPT

## 2024-07-11 PROCEDURE — 83735 ASSAY OF MAGNESIUM: CPT

## 2024-07-11 PROCEDURE — 85025 COMPLETE CBC W/AUTO DIFF WBC: CPT

## 2024-07-11 PROCEDURE — 2580000003 HC RX 258

## 2024-07-11 RX ADMIN — MICONAZOLE NITRATE: 2 POWDER TOPICAL at 08:14

## 2024-07-11 RX ADMIN — COLCHICINE 0.6 MG: 0.6 TABLET, FILM COATED ORAL at 08:11

## 2024-07-11 RX ADMIN — CEFEPIME 2000 MG: 2 INJECTION, POWDER, FOR SOLUTION INTRAVENOUS at 04:10

## 2024-07-11 RX ADMIN — TRAMADOL HYDROCHLORIDE 50 MG: 50 TABLET ORAL at 05:53

## 2024-07-11 RX ADMIN — PREDNISONE 5 MG: 5 TABLET ORAL at 08:11

## 2024-07-11 RX ADMIN — DULOXETINE HYDROCHLORIDE 60 MG: 60 CAPSULE, DELAYED RELEASE ORAL at 08:11

## 2024-07-11 RX ADMIN — RUGBY ZINC OXIDE 20%: 20 OINTMENT TOPICAL at 08:14

## 2024-07-11 RX ADMIN — APIXABAN 5 MG: 5 TABLET, FILM COATED ORAL at 20:28

## 2024-07-11 RX ADMIN — MICONAZOLE NITRATE: 2 POWDER TOPICAL at 20:41

## 2024-07-11 RX ADMIN — METHENAMINE HIPPURATE 1 G: 1000 TABLET ORAL at 08:11

## 2024-07-11 RX ADMIN — FERROUS SULFATE TAB 325 MG (65 MG ELEMENTAL FE) 325 MG: 325 (65 FE) TAB at 08:11

## 2024-07-11 RX ADMIN — ESTRADIOL 2 G: 0.1 CREAM VAGINAL at 08:14

## 2024-07-11 RX ADMIN — Medication 1 TABLET: at 08:11

## 2024-07-11 RX ADMIN — ALLOPURINOL 100 MG: 100 TABLET ORAL at 08:11

## 2024-07-11 RX ADMIN — PANTOPRAZOLE SODIUM 40 MG: 40 TABLET, DELAYED RELEASE ORAL at 08:11

## 2024-07-11 RX ADMIN — APIXABAN 5 MG: 5 TABLET, FILM COATED ORAL at 08:11

## 2024-07-11 RX ADMIN — LOSARTAN POTASSIUM 50 MG: 50 TABLET, FILM COATED ORAL at 08:11

## 2024-07-11 RX ADMIN — TRAMADOL HYDROCHLORIDE 50 MG: 50 TABLET ORAL at 20:36

## 2024-07-11 RX ADMIN — Medication 500 MG: at 08:11

## 2024-07-11 RX ADMIN — LEFLUNOMIDE 20 MG: 20 TABLET ORAL at 20:29

## 2024-07-11 RX ADMIN — LEVOTHYROXINE SODIUM 75 MCG: 75 TABLET ORAL at 05:40

## 2024-07-11 RX ADMIN — CEFEPIME 2000 MG: 2 INJECTION, POWDER, FOR SOLUTION INTRAVENOUS at 20:54

## 2024-07-11 RX ADMIN — FOLIC ACID 1 MG: 1 TABLET ORAL at 08:11

## 2024-07-11 RX ADMIN — ASPIRIN 81 MG: 81 TABLET, COATED ORAL at 08:11

## 2024-07-11 RX ADMIN — METHENAMINE HIPPURATE 1 G: 1000 TABLET ORAL at 16:31

## 2024-07-11 ASSESSMENT — PAIN DESCRIPTION - FREQUENCY: FREQUENCY: INTERMITTENT

## 2024-07-11 ASSESSMENT — PAIN DESCRIPTION - ONSET: ONSET: ON-GOING

## 2024-07-11 ASSESSMENT — PAIN DESCRIPTION - DESCRIPTORS
DESCRIPTORS: ACHING
DESCRIPTORS: ACHING

## 2024-07-11 ASSESSMENT — PAIN DESCRIPTION - ORIENTATION
ORIENTATION: POSTERIOR
ORIENTATION: POSTERIOR

## 2024-07-11 ASSESSMENT — PAIN SCALES - GENERAL
PAINLEVEL_OUTOF10: 0
PAINLEVEL_OUTOF10: 0
PAINLEVEL_OUTOF10: 6
PAINLEVEL_OUTOF10: 6

## 2024-07-11 ASSESSMENT — PAIN DESCRIPTION - PAIN TYPE: TYPE: ACUTE PAIN

## 2024-07-11 ASSESSMENT — PAIN DESCRIPTION - LOCATION
LOCATION: NECK
LOCATION: NECK

## 2024-07-11 NOTE — PROGRESS NOTES
07/11/24 1300   Restrictions/Precautions   Restrictions/Precautions Fall Risk;Isolation   General   Chart Reviewed Yes   Additional Pertinent Hx RA, pacemaker, CAD, MI, DVT, CHF, neurogenic bladder, HTN, charcot's joint, psoriasis vulgaris, osteoporosis, hypothyroidism, cervical spinal stenosis   Diagnosis Acute cholecystitis   Vitals   /65   MAP (Calculated) 86   BP Location Left upper arm   BP Method Automatic   Patient Position Sitting   Bed mobility   Supine to Sit Moderate assistance   Scooting Moderate assistance   Transfers   Sit to Stand Maximum Assistance;Moderate Assistance   Stand to Sit Maximum Assistance;Moderate Assistance   Bed to Chair Dependent/Total;Maximum assistance  (kamlesh steady)   PT Exercises   Exercise Treatment supine BLE exercises; hip ABD/ADD, ankle pumps, heel slides x10 ea. Seated BLE exercises Hip ext, HS curls x10 ea.   Assessment   Assessment Pt more alert this afternoon compared to morning session; reports that she is feeling better. Pt transferred to recliner with kamlesh steady; performed supine and seated BLE exercises. Tolerated exercises well. continues to have decreased ROM and strength in R LE, possibly due to gout. BP assessed while sitting EOB; within therapeutic range.   History Acute cholecystitis   Safety Devices   Type of Devices Left in chair   PT Individual Minutes   Time In 1300   Time Out 1345   Minutes 45     Electronically signed by GUEVARA Schultz on 7/11/2024 at 1:58 PM

## 2024-07-11 NOTE — PROGRESS NOTES
Facility/Department: [facility] 8 REHAB UNIT  Occupational Therapy     Name: Kelsey Morejon  : 1955  MRN: [id number]  Date of Service: 2024    Discharge Recommendations:  Continue to assess pending progress    Patient Diagnosis(es): There were no encounter diagnoses.  Past Medical History:  has a past medical history of CAD (coronary artery disease), Cellulitis, History of blood transfusion, History of DVT (deep vein thrombosis), History of neutropenia, History of pulmonary fibrosis, Hx of blood clots, Hypercholesterolemia, Hypertension, Intrinsic asthma, Pacemaker, Post op infection, Rheumatoid arteritis (HCC), Sinus node dysfunction (HCC), Staph aureus infection, Status post placement of implantable loop recorder, Syncope, and Thyroid disease.  Past Surgical History:  has a past surgical history that includes Diagnostic Cardiac Cath Lab Procedure; LEEP; Coronary angioplasty with stent (); Pacemaker insertion (2016); back surgery (2018); pr arthrp kne condyle&platu medial&lat compartments (Right, 2018); pr i&d deep absc bursa/hematoma thigh/knee region (Right, 10/23/2018); Cardiac catheterization (14  MDL); Cardiac catheterization (1/26/15  MDL); pacemaker placement; joint replacement; and Revision total knee arthroplasty (Right, 2018).    Treatment Diagnosis: Acute cholecystitis, s/p cholecystectomy    Assessment   Performance deficits / Impairments: Decreased functional mobility ;Decreased ADL status;Decreased strength;Decreased endurance;Decreased balance;Decreased high-level IADLs  Treatment Diagnosis: Acute cholecystitis, s/p cholecystectomy  Prognosis: Good  Activity Tolerance  Activity Tolerance: Patient Tolerated treatment well     Plan   Occupational Therapy Plan  Current Treatment Recommendations: Strengthening, Balance training, Functional mobility training, Patient/Caregiver education & training, Equipment evaluation, education, & procurement, Safety education &

## 2024-07-11 NOTE — PROGRESS NOTES
07/11/24 0900   Restrictions/Precautions   Restrictions/Precautions Fall Risk;Isolation   General   Chart Reviewed Yes   Additional Pertinent Hx RA, pacemaker, CAD, MI, DVT, CHF, neurogenic bladder, HTN, charcot's joint, psoriasis vulgaris, osteoporosis, hypothyroidism, cervical spinal stenosis   Diagnosis Acute cholecystitis   Bed mobility   Rolling to Left Minimal assistance   Rolling to Right Minimal assistance   Sit to Supine Maximum assistance  (w/ trunk and LE)   Scooting Maximal assistance;2 Person assistance  (w/ use of kaitlynn pad)   PT Exercises   Exercise Treatment supine BLE exercises; heel slides, ankle pumps, hip ABD/ADD, SLR, SAQ 2x10 ea. AAROM w/ R LE.   Assessment   Assessment Pt sitting EOB w/ OT on arrival. Pt asked to lay in bed for therapy due to fatigue. Performed supine bilat LE exercises; tolerated well. Needed significant rest breaks following activity. Continues to show decreased RLE strength and control compared to L LE. Continues to have decreased overall endurance.   History Acute cholecystitis   Safety Devices   Type of Devices Bed alarm in place;Call light within reach;Left in bed   PT Individual Minutes   Time In 0900   Time Out 1000   Minutes 60     Electronically signed by GUEVARA Schultz on 7/11/2024 at 9:54 AM

## 2024-07-11 NOTE — PROGRESS NOTES
Facility/Department: [facility] REHAB UNIT   CLINICAL BEDSIDE SWALLOW EVALUATION    NAME: Kelsey Morejon  : 1955  MRN: [id number]    ADMISSION DATE: 2024  ADMITTING DIAGNOSIS: has Coronary artery disease of native artery of native heart with stable angina pectoris (HCC); Hypertension; Rheumatoid arteritis (HCC); History of pulmonary fibrosis; History of DVT (deep vein thrombosis); Thyroid disease; Intrinsic asthma; Hypercholesterolemia; Syncope, cardiogenic; Near syncope; Status post placement of implantable loop recorder; Left carotid bruit; S/P coronary artery stent placement; Chest pain; Pacemaker; Sinus node dysfunction ([facility]); Lumbar stenosis with neurogenic claudication; Spondylolisthesis of lumbar region; Leg swelling; Primary osteoarthritis of right knee; Infection of total right knee replacement ([facility]); Myalgia due to statin; Abnormal stress test; Discitis; MSSA bacteremia; Acute cystitis without hematuria; Closed wedge compression fracture of T2 vertebra with routine healing; S/P kyphoplasty; Acute midline thoracic back pain; Anemia; Severe malnutrition ([facility]); S/P cholecystectomy; Paraplegia ([facility]); and Urinary incontinence on their problem list.    Date of Eval: 2024  Evaluating Therapist: LAURI Marks    Clinical Impression  Speech assessment completed on this date. Mild dysarthria is present as characterized by decreased vocal intensity and decreased lingual and labial ROM, coordination, and strength.     Clinical Bedside Swallow Evaluation completed on this date. Oral prep reveals decreased rotary jaw movement observed with ice chips and regular solids. Mildly decreased labial seal observed. Oral transit timing ranges from 1-2 seconds with puree consistencies. Oral transit timing ranges from 2-4 seconds with regular solids. Minimum oral residue noted with regular solids and cleared with liquid wash. Liquid wash was effective in clearing minimal oral residue. Oral transit timing is suspected

## 2024-07-11 NOTE — PROGRESS NOTES
Nutrition Assessment     Type and Reason for Visit: Reassess    Malnutrition Assessment:  Malnutrition Status: At risk for malnutrition (Comment)    Nutrition Assessment:  PO intake is adequate to meet estimated needs at this time. PO avg %. Pt had mildly decreased intake yesterday due to nausea. She states she is feeling much better today and was eating breakfast. BG mildly elevated (99-160mg/dL) due to Prednisone. No new wt available. Continue POC.    Estimated Daily Nutrient Needs:  Energy (kcal):  4334-9964 (15-18kcal/kg) Weight Used for Energy Requirements: Current     Protein (g):   (1.2-2.0g/kg) Weight Used for Protein Requirements: Ideal        Fluid (ml/day):  4840-7157 Method Used for Fluid Requirements: 1 ml/kcal    Nutrition Related Findings:   +3 RLE edema, +1 LLE edema Wound Type: Open Wounds (buttocks)    Current Nutrition Therapies:    ADULT DIET; Regular; No scrambled eggs    Anthropometric Measures:  Height: 167.6 cm (5' 6\")  Current Body Wt: 91.3 kg (201 lb 4.5 oz)   BMI: 32.5    Nutrition Diagnosis:   Increased nutrient needs related to increase demand for energy/nutrients as evidenced by wounds    Nutrition Interventions:   Food and/or Nutrient Delivery: Continue Current Diet  Nutrition Education/Counseling: No recommendation at this time  Coordination of Nutrition Care: Continue to monitor while inpatient       Goals:  Previous Goal Met: Progressing toward Goal(s)  Goals: PO intake 50% or greater       Nutrition Monitoring and Evaluation:   Behavioral-Environmental Outcomes: None Identified  Food/Nutrient Intake Outcomes: Food and Nutrient Intake  Physical Signs/Symptoms Outcomes: Biochemical Data, Nutrition Focused Physical Findings, Skin, Weight    Discharge Planning:    Too soon to determine     Carrie Montoya, MS, RD, LD, CDCES  Contact: [phone]

## 2024-07-11 NOTE — PLAN OF CARE
Problem: Safety - Adult  Goal: Free from fall injury  7/10/2024 1904 by Slick Virgen, RN  Outcome: Progressing  7/10/2024 1621 by Angelina Wynne, RN  Outcome: Progressing

## 2024-07-11 NOTE — PROGRESS NOTES
Occupational Therapy  Facility/Department: [facility] 8 REHAB UNIT  Rehabilitation Occupational Therapy Daily Treatment Note    Date: 24  Patient Name: Kelsey Morejon       Room: 0815/815-02  MRN: [id number]  Account: [id number]   : 1955  (68 y.o.) Gender: female        Diagnosis: Acute cholecystitis, s/p cholecystectomy       Treatment Diagnosis: Acute cholecystitis, s/p cholecystectomy   Past Medical History:  has a past medical history of CAD (coronary artery disease), Cellulitis, History of blood transfusion, History of DVT (deep vein thrombosis), History of neutropenia, History of pulmonary fibrosis, Hx of blood clots, Hypercholesterolemia, Hypertension, Intrinsic asthma, Pacemaker, Post op infection, Rheumatoid arteritis (HCC), Sinus node dysfunction (HCC), Staph aureus infection, Status post placement of implantable loop recorder, Syncope, and Thyroid disease.  Past Surgical History:   has a past surgical history that includes Diagnostic Cardiac Cath Lab Procedure; LEEP; Coronary angioplasty with stent (); Pacemaker insertion (2016); back surgery (2018); pr arthrp kne condyle&platu medial&lat compartments (Right, 2018); pr i&d deep absc bursa/hematoma thigh/knee region (Right, 10/23/2018); Cardiac catheterization (14  MDL); Cardiac catheterization (1/26/15  MDL); pacemaker placement; joint replacement; and Revision total knee arthroplasty (Right, 2018).    Restrictions  Restrictions/Precautions: Fall Risk, Isolation    Subjective     24 0815   General   Family / Caregiver Present Yes   Diagnosis Acute cholecystitis, s/p cholecystectomy   Pain Screening   Pain at present 5   Scale Used Numeric Score   Intervention List Patient able to continue with treatment   Comments / Details Neck.   General Comment   Comments Pt became nauseaus and vomited after taking morning meds. Pt able to continue with treatment after rest period.     Objective     24 0815   Balance

## 2024-07-11 NOTE — PROGRESS NOTES
Patient:   Kelsey Morejon  MR#:    [id number]   Room:    [facility]/815-02   YOB: 1955  Date of Progress Note: 7/10/2024  Time of Note                           7:42 AM  Consulting Physician:   Guy Stubbs M.D.  Attending Physician:  Guy Stubbs MD     Chief complaint Status post cholecystectomy     S:This 68 y.o. 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 Patient admitted on 6/13/2024 with AMS. She was found to have UTI  and left ureteral calculus with obstruction and acute hypoxic respiratory failure. She has been treated with Cefepime. Lithotripsy performed on 6/21/2024. GI was consulted on 6/21/2024 for abdominal pain. GB US showed cholelithiasis with small stones and sludge. HIDA scan done showed Non visualization of the gallbladder and could not rule out cystic duct obstruction.  On 07/03/24 she underwent a CHOLECYSTECTOMY LAPAROSCOPIC WITH INTRAOPERATIVE CHOLANGIOGRAM by   Lexa Waddell MD. She tolerated procedure well.   She does have a wound on her R buttock from cyst removal by Dr. Waddell. The wound has opened up and has been dressed w/ wet-to-dry dressings by Home Health. Wound measures 1.2cm x 2 cm x 2.5. Undermining circumferentially reaching 5cm at 4 o'clock. Subcutaneous tissue, wound bed. Small amount of serosanguineous drainage. Aixa wound area is slight erythema but blanchable.  She was complaining about some difficulty swallowing. Speech evaluated her. She is now tolerating a regular diet, thin liquids with meds whole in applesauce.   She is now medically stable and is participating with therapy. She is ready to begin rehab with goal of returning home after rehab dc with support from her caregivers and family.  Yesterday syncope after large bowel movement.  Was hypotensive.  Noted to have urinary tract infection.  Fluid bolus was given and medications were adjusted.

## 2024-07-12 LAB
ACANTHOCYTES BLD QL SMEAR: ABNORMAL
ALBUMIN SERPL-MCNC: 2.6 G/DL (ref 3.5–5.2)
ALP SERPL-CCNC: 92 U/L (ref 35–104)
ALT SERPL-CCNC: 26 U/L (ref 5–33)
ANION GAP SERPL CALCULATED.3IONS-SCNC: 11 MMOL/L (ref 7–19)
AST SERPL-CCNC: 21 U/L (ref 5–32)
BASOPHILS # BLD: 0.1 K/UL (ref 0–0.2)
BASOPHILS NFR BLD: 1.1 % (ref 0–1)
BILIRUB SERPL-MCNC: 0.5 MG/DL (ref 0.2–1.2)
BUN SERPL-MCNC: 15 MG/DL (ref 8–23)
CALCIUM SERPL-MCNC: 8.3 MG/DL (ref 8.8–10.2)
CHLORIDE SERPL-SCNC: 103 MMOL/L (ref 98–111)
CO2 SERPL-SCNC: 25 MMOL/L (ref 22–29)
CREAT SERPL-MCNC: 0.9 MG/DL (ref 0.5–0.9)
DACRYOCYTES BLD QL SMEAR: ABNORMAL
EOSINOPHIL # BLD: 0.3 K/UL (ref 0–0.6)
EOSINOPHIL NFR BLD: 7.1 % (ref 0–5)
ERYTHROCYTE [DISTWIDTH] IN BLOOD BY AUTOMATED COUNT: 20.7 % (ref 11.5–14.5)
GLUCOSE BLD-MCNC: 128 MG/DL (ref 70–99)
GLUCOSE BLD-MCNC: 187 MG/DL (ref 70–99)
GLUCOSE BLD-MCNC: 189 MG/DL (ref 70–99)
GLUCOSE BLD-MCNC: 98 MG/DL (ref 70–99)
GLUCOSE SERPL-MCNC: 79 MG/DL (ref 74–109)
HCT VFR BLD AUTO: 28.9 % (ref 37–47)
HGB BLD-MCNC: 8.3 G/DL (ref 12–16)
HYPOCHROMIA BLD QL SMEAR: ABNORMAL
IMM GRANULOCYTES # BLD: 0.2 K/UL
LYMPHOCYTES # BLD: 1.5 K/UL (ref 1.1–4.5)
LYMPHOCYTES NFR BLD: 33.1 % (ref 20–40)
MAGNESIUM SERPL-MCNC: 2.1 MG/DL (ref 1.6–2.4)
MCH RBC QN AUTO: 28 PG (ref 27–31)
MCHC RBC AUTO-ENTMCNC: 28.7 G/DL (ref 33–37)
MCV RBC AUTO: 97.6 FL (ref 81–99)
MONOCYTES # BLD: 1 K/UL (ref 0–0.9)
MONOCYTES NFR BLD: 22.3 % (ref 0–10)
NEUTROPHILS # BLD: 1.4 K/UL (ref 1.5–7.5)
NEUTS SEG NFR BLD: 31.5 % (ref 50–65)
PERFORMED ON: ABNORMAL
PERFORMED ON: NORMAL
PLATELET # BLD AUTO: 179 K/UL (ref 130–400)
PLATELET SLIDE REVIEW: ADEQUATE
PMV BLD AUTO: 10.8 FL (ref 9.4–12.3)
POIKILOCYTOSIS BLD QL SMEAR: ABNORMAL
POTASSIUM SERPL-SCNC: 4.1 MMOL/L (ref 3.5–5)
PROT SERPL-MCNC: 5.7 G/DL (ref 6.6–8.7)
RBC # BLD AUTO: 2.96 M/UL (ref 4.2–5.4)
SCHISTOCYTES BLD QL SMEAR: ABNORMAL
SODIUM SERPL-SCNC: 139 MMOL/L (ref 136–145)
WBC # BLD AUTO: 4.5 K/UL (ref 4.8–10.8)

## 2024-07-12 PROCEDURE — 2580000003 HC RX 258

## 2024-07-12 PROCEDURE — 36415 COLL VENOUS BLD VENIPUNCTURE: CPT

## 2024-07-12 PROCEDURE — 94760 N-INVAS EAR/PLS OXIMETRY 1: CPT

## 2024-07-12 PROCEDURE — 97535 SELF CARE MNGMENT TRAINING: CPT

## 2024-07-12 PROCEDURE — 99232 SBSQ HOSP IP/OBS MODERATE 35: CPT | Performed by: PSYCHIATRY & NEUROLOGY

## 2024-07-12 PROCEDURE — 97530 THERAPEUTIC ACTIVITIES: CPT

## 2024-07-12 PROCEDURE — 6360000002 HC RX W HCPCS

## 2024-07-12 PROCEDURE — 6370000000 HC RX 637 (ALT 250 FOR IP): Performed by: PSYCHIATRY & NEUROLOGY

## 2024-07-12 PROCEDURE — 85025 COMPLETE CBC W/AUTO DIFF WBC: CPT

## 2024-07-12 PROCEDURE — 97110 THERAPEUTIC EXERCISES: CPT

## 2024-07-12 PROCEDURE — 80053 COMPREHEN METABOLIC PANEL: CPT

## 2024-07-12 PROCEDURE — 83735 ASSAY OF MAGNESIUM: CPT

## 2024-07-12 PROCEDURE — 82962 GLUCOSE BLOOD TEST: CPT

## 2024-07-12 PROCEDURE — 1180000000 HC REHAB R&B

## 2024-07-12 RX ADMIN — METHENAMINE HIPPURATE 1 G: 1000 TABLET ORAL at 08:19

## 2024-07-12 RX ADMIN — FOLIC ACID 1 MG: 1 TABLET ORAL at 08:19

## 2024-07-12 RX ADMIN — OXYCODONE HYDROCHLORIDE AND ACETAMINOPHEN 1 TABLET: 5; 325 TABLET ORAL at 05:30

## 2024-07-12 RX ADMIN — PREDNISONE 5 MG: 5 TABLET ORAL at 08:19

## 2024-07-12 RX ADMIN — METHENAMINE HIPPURATE 1 G: 1000 TABLET ORAL at 16:39

## 2024-07-12 RX ADMIN — Medication 500 MG: at 08:19

## 2024-07-12 RX ADMIN — Medication 1 TABLET: at 08:19

## 2024-07-12 RX ADMIN — CEFEPIME 2000 MG: 2 INJECTION, POWDER, FOR SOLUTION INTRAVENOUS at 20:54

## 2024-07-12 RX ADMIN — APIXABAN 5 MG: 5 TABLET, FILM COATED ORAL at 20:55

## 2024-07-12 RX ADMIN — PANTOPRAZOLE SODIUM 40 MG: 40 TABLET, DELAYED RELEASE ORAL at 08:19

## 2024-07-12 RX ADMIN — ASPIRIN 81 MG: 81 TABLET, COATED ORAL at 08:18

## 2024-07-12 RX ADMIN — LEFLUNOMIDE 20 MG: 20 TABLET ORAL at 20:55

## 2024-07-12 RX ADMIN — APIXABAN 5 MG: 5 TABLET, FILM COATED ORAL at 08:19

## 2024-07-12 RX ADMIN — DULOXETINE HYDROCHLORIDE 60 MG: 60 CAPSULE, DELAYED RELEASE ORAL at 08:19

## 2024-07-12 RX ADMIN — ACETAMINOPHEN 650 MG: 325 TABLET ORAL at 08:38

## 2024-07-12 RX ADMIN — MICONAZOLE NITRATE: 2 POWDER TOPICAL at 21:00

## 2024-07-12 RX ADMIN — CEFEPIME 2000 MG: 2 INJECTION, POWDER, FOR SOLUTION INTRAVENOUS at 08:47

## 2024-07-12 RX ADMIN — MICONAZOLE NITRATE: 2 POWDER TOPICAL at 09:01

## 2024-07-12 RX ADMIN — FERROUS SULFATE TAB 325 MG (65 MG ELEMENTAL FE) 325 MG: 325 (65 FE) TAB at 08:19

## 2024-07-12 RX ADMIN — LEVOTHYROXINE SODIUM 75 MCG: 75 TABLET ORAL at 05:30

## 2024-07-12 RX ADMIN — ALLOPURINOL 100 MG: 100 TABLET ORAL at 08:19

## 2024-07-12 RX ADMIN — Medication: at 21:00

## 2024-07-12 RX ADMIN — COLCHICINE 0.6 MG: 0.6 TABLET, FILM COATED ORAL at 08:19

## 2024-07-12 ASSESSMENT — PAIN DESCRIPTION - DESCRIPTORS
DESCRIPTORS: ACHING;DULL
DESCRIPTORS: ACHING
DESCRIPTORS: ACHING

## 2024-07-12 ASSESSMENT — PAIN DESCRIPTION - PAIN TYPE: TYPE: CHRONIC PAIN

## 2024-07-12 ASSESSMENT — PAIN DESCRIPTION - LOCATION
LOCATION: FOOT

## 2024-07-12 ASSESSMENT — PAIN DESCRIPTION - FREQUENCY: FREQUENCY: INTERMITTENT

## 2024-07-12 ASSESSMENT — PAIN DESCRIPTION - ORIENTATION
ORIENTATION: RIGHT

## 2024-07-12 ASSESSMENT — PAIN - FUNCTIONAL ASSESSMENT: PAIN_FUNCTIONAL_ASSESSMENT: ACTIVITIES ARE NOT PREVENTED

## 2024-07-12 ASSESSMENT — PAIN SCALES - GENERAL
PAINLEVEL_OUTOF10: 6
PAINLEVEL_OUTOF10: 4
PAINLEVEL_OUTOF10: 0

## 2024-07-12 ASSESSMENT — PAIN DESCRIPTION - ONSET: ONSET: GRADUAL

## 2024-07-12 NOTE — PLAN OF CARE
Problem: Safety - Adult  Goal: Free from fall injury  7/11/2024 1859 by Slick Virgen, RN  Outcome: Progressing  7/11/2024 1013 by Angelina Wynne, RN  Outcome: Progressing

## 2024-07-12 NOTE — PROGRESS NOTES
07/12/24 0900   Restrictions/Precautions   Restrictions/Precautions Fall Risk;Isolation   General   Chart Reviewed Yes   Additional Pertinent Hx RA, pacemaker, CAD, MI, DVT, CHF, neurogenic bladder, HTN, charcot's joint, psoriasis vulgaris, osteoporosis, hypothyroidism, cervical spinal stenosis   Diagnosis Acute cholecystitis   Bed mobility   Rolling to Left Contact guard assistance   Rolling to Right Minimal assistance   Supine to Sit Maximum assistance   Scooting Maximal assistance   Transfers   Sit to Stand Moderate Assistance   Stand to Sit Moderate Assistance   Bed to Chair Dependent/Total;Maximum assistance  (kamlesh steady)   PT Exercises   Exercise Treatment seated Bilat LE exercises 2x10 each; marches, hip ext, Hip ABD/ADD, ankle pumps, LAQ, heel slides, HS curls w/ manual resist   Assessment   Assessment Pt reports feeling well this morning. Pt transferred from bed to recliner w/ kamlesh steady. Tolerated LE exercises well. Continues to have decreased ROM and strength in R LE compared to L LE. Plan to incorporate more standing exercises in future sessions.   History Acute cholecystitis   Safety Devices   Type of Devices Call light within reach;Left in chair   PT Individual Minutes   Time In 0900   Time Out 1000   Minutes 60

## 2024-07-12 NOTE — PROGRESS NOTES
Kelsey Densonburn  [id number]       07/12/24 1311 07/12/24 1312 07/12/24 1334   Restrictions/Precautions   Restrictions/Precautions Fall Risk;Isolation  --   --    General   Additional Pertinent Hx RA, pacemaker, CAD, MI, DVT, CHF, neurogenic bladder, HTN, charcot's joint, psoriasis vulgaris, osteoporosis, hypothyroidism, cervical spinal stenosis  --   --    Diagnosis Acute cholecystitis  --   --    Subjective   Subjective Pt. agreeable to therapy session.  --   --    Subjective   Pain  --  denies  --    PT Exercises   Exercise Treatment  --   --  Supine BLE exercises  x10 reps/ 2 sets performed at bedside.   Activity Tolerance   Activity Tolerance  --   --   --    Assessment   Assessment  --   --   --    Safety Devices   Type of Devices  --   --   --    PT Individual Minutes   Time In  --   --   --    Time Out  --   --   --    Minutes  --   --   --       07/12/24 1335   Restrictions/Precautions   Restrictions/Precautions  --    General   Additional Pertinent Hx  --    Diagnosis  --    Subjective   Subjective  --    Subjective   Pain  --    PT Exercises   Exercise Treatment  --    Activity Tolerance   Activity Tolerance Patient tolerated treatment well;Patient limited by endurance   Assessment   Assessment Pt. performed supine LE exercises at bedside this session.   Safety Devices   Type of Devices Bed alarm in place;Call light within reach   PT Individual Minutes   Time In 1300   Time Out 1338   Minutes 38     Electronically signed by Dunia Mora PTA on 7/12/2024 at 1:36 PM

## 2024-07-12 NOTE — PLAN OF CARE
Problem: Discharge Planning  Goal: Discharge to home or other facility with appropriate resources  Outcome: Progressing     Problem: Skin/Tissue Integrity  Goal: Absence of new skin breakdown  Description: 1.  Monitor for areas of redness and/or skin breakdown  2.  Assess vascular access sites hourly  3.  Every 4-6 hours minimum:  Change oxygen saturation probe site  4.  Every 4-6 hours:  If on nasal continuous positive airway pressure, respiratory therapy assess nares and determine need for appliance change or resting period.  Outcome: Progressing     Problem: Safety - Adult  Goal: Free from fall injury  Outcome: Progressing     Problem: ABCDS Injury Assessment  Goal: Absence of physical injury  Outcome: Progressing     Problem: Nutrition Deficit:  Goal: Optimize nutritional status  Outcome: Progressing     Problem: Pain  Goal: Verbalizes/displays adequate comfort level or baseline comfort level  Outcome: Progressing

## 2024-07-12 NOTE — PROGRESS NOTES
Occupational Therapy  Facility/Department: [facility] 8 REHAB UNIT  Rehabilitation Occupational Therapy Daily Treatment Note    Date: 24  Patient Name: Kelsey Morejon       Room: 0815/815-02  MRN: [id number]  Account: [id number]   : 1955  (68 y.o.) Gender: female        Diagnosis: (P) Acute cholecystitis, s/p cholecystectomy       Treatment Diagnosis: (P) Acute cholecystitis, s/p cholecystectomy   Past Medical History:  has a past medical history of CAD (coronary artery disease), Cellulitis, History of blood transfusion, History of DVT (deep vein thrombosis), History of neutropenia, History of pulmonary fibrosis, Hx of blood clots, Hypercholesterolemia, Hypertension, Intrinsic asthma, Pacemaker, Post op infection, Rheumatoid arteritis (HCC), Sinus node dysfunction (HCC), Staph aureus infection, Status post placement of implantable loop recorder, Syncope, and Thyroid disease.  Past Surgical History:   has a past surgical history that includes Diagnostic Cardiac Cath Lab Procedure; LEEP; Coronary angioplasty with stent (); Pacemaker insertion (2016); back surgery (2018); pr arthrp kne condyle&platu medial&lat compartments (Right, 2018); pr i&d deep absc bursa/hematoma thigh/knee region (Right, 10/23/2018); Cardiac catheterization (14  MDL); Cardiac catheterization (1/26/15  MDL); pacemaker placement; joint replacement; and Revision total knee arthroplasty (Right, 2018).     24 1345   Restrictions/Precautions   Restrictions/Precautions Fall Risk;Isolation   General   Diagnosis Acute cholecystitis, s/p cholecystectomy   ADL   LE Dressing Maximum assistance  (supine)   LE Dressing Skilled Clinical Factors rolls Flor[place]ma to L, Min A to fully roll over to left hip   Balance   Sitting Balance Stand by assistance   Functional Mobility   Functional - Mobility Device Wheelchair  (PWC)   Activity To/From therapy gym   Assist Level Stand by assistance   Functional Mobility Comments Ind with

## 2024-07-12 NOTE — PROGRESS NOTES
Facility/Department: [facility] 8 REHAB UNIT  Occupational Therapy     Name: Kelsey Morejon  : 1955  MRN: [id number]  Date of Service: 2024    Discharge Recommendations:  Continue to assess pending progress    Patient Diagnosis(es): There were no encounter diagnoses.  Past Medical History:  has a past medical history of CAD (coronary artery disease), Cellulitis, History of blood transfusion, History of DVT (deep vein thrombosis), History of neutropenia, History of pulmonary fibrosis, Hx of blood clots, Hypercholesterolemia, Hypertension, Intrinsic asthma, Pacemaker, Post op infection, Rheumatoid arteritis (HCC), Sinus node dysfunction (HCC), Staph aureus infection, Status post placement of implantable loop recorder, Syncope, and Thyroid disease.  Past Surgical History:  has a past surgical history that includes Diagnostic Cardiac Cath Lab Procedure; LEEP; Coronary angioplasty with stent (); Pacemaker insertion (2016); back surgery (2018); pr arthrp kne condyle&platu medial&lat compartments (Right, 2018); pr i&d deep absc bursa/hematoma thigh/knee region (Right, 10/23/2018); Cardiac catheterization (14  MDL); Cardiac catheterization (1/26/15  MDL); pacemaker placement; joint replacement; and Revision total knee arthroplasty (Right, 2018).    Treatment Diagnosis: Acute cholecystitis, s/p cholecystectomy    Assessment   Performance deficits / Impairments: Decreased functional mobility ;Decreased ADL status;Decreased strength;Decreased endurance;Decreased balance;Decreased high-level IADLs  Assessment: Pt. tolerated tx well, completed shower and dressing ADLs, Min A x1 for transfers via kamlesh [employer] this tx session, no c/o of symptoms relating to orthostaic BP. The pt. would benefit from continued skilled therapy to address transfers, and functional mobility prior to d/c home.  Treatment Diagnosis: Acute cholecystitis, s/p cholecystectomy  Prognosis: Good  Activity Tolerance  Activity

## 2024-07-12 NOTE — PROGRESS NOTES
Patient:   Kelsey Morejon  MR#:    [id number]   Room:    [facility]/815-02   YOB: 1955  Date of Progress Note: 7/10/2024  Time of Note                           7:42 AM  Consulting Physician:   Guy Stubbs M.D.  Attending Physician:  Guy Stubbs MD     Chief complaint Status post cholecystectomy     S:This 68 y.o. 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 Patient admitted on 6/13/2024 with AMS. She was found to have UTI  and left ureteral calculus with obstruction and acute hypoxic respiratory failure. She has been treated with Cefepime. Lithotripsy performed on 6/21/2024. GI was consulted on 6/21/2024 for abdominal pain. GB US showed cholelithiasis with small stones and sludge. HIDA scan done showed Non visualization of the gallbladder and could not rule out cystic duct obstruction.  On 07/03/24 she underwent a CHOLECYSTECTOMY LAPAROSCOPIC WITH INTRAOPERATIVE CHOLANGIOGRAM by   Lexa Waddell MD. She tolerated procedure well.   She does have a wound on her R buttock from cyst removal by Dr. Waddell. The wound has opened up and has been dressed w/ wet-to-dry dressings by Home Health. Wound measures 1.2cm x 2 cm x 2.5. Undermining circumferentially reaching 5cm at 4 o'clock. Subcutaneous tissue, wound bed. Small amount of serosanguineous drainage. Aixa wound area is slight erythema but blanchable.  She was complaining about some difficulty swallowing. Speech evaluated her. She is now tolerating a regular diet, thin liquids with meds whole in applesauce.   She is now medically stable and is participating with therapy. She is ready to begin rehab with goal of returning home after rehab dc with support from her caregivers and family.  Previously syncope after large bowel movement.  Was hypotensive.  Noted to have urinary tract infection.  Fluid bolus was given and medications were adjusted.       Lab Results   Component Value Date     07/09/2024    K 4.4 07/09/2024     07/09/2024    CO2 24 07/09/2024    BUN 17 07/09/2024    CREATININE 0.8 07/09/2024    GLUCOSE 86 07/09/2024    CALCIUM 8.4 (L) 07/09/2024    BILITOT 0.4 07/09/2024    ALKPHOS 89 07/09/2024    AST 28 07/09/2024    ALT 26 07/09/2024    LABGLOM 79 07/09/2024    GFRAA >59 11/01/2021     Lab Results   Component Value Date    INR 1.50 (H) 10/28/2021    INR 1.20 (H) 12/05/2018    INR 1.14 09/04/2018    PROTIME 18.2 (H) 10/28/2021    PROTIME 15.2 (H) 12/05/2018    PROTIME 14.5 09/04/2018       Zabrina Au SPT  Student Physical Therapist  Physical Therapy     Progress Notes      Cosign Needed     Date of Service: 7/11/2024  1:58 PM     Cosign Needed              07/11/24 1300   Restrictions/Precautions   Restrictions/Precautions Fall Risk;Isolation   General   Chart Reviewed Yes   Additional Pertinent Hx RA, pacemaker, CAD, MI, DVT, CHF, neurogenic bladder, HTN, charcot's joint, psoriasis vulgaris, osteoporosis, hypothyroidism, cervical spinal stenosis   Diagnosis Acute cholecystitis   Vitals   /65   MAP (Calculated) 86   BP Location Left upper arm   BP Method Automatic   Patient Position Sitting   Bed mobility   Supine to Sit Moderate assistance   Scooting Moderate assistance   Transfers   Sit to Stand Maximum Assistance;Moderate Assistance   Stand to Sit Maximum Assistance;Moderate Assistance   Bed to Chair Dependent/Total;Maximum assistance  (kamlesh steady)   PT Exercises   Exercise Treatment supine BLE exercises; hip ABD/ADD, ankle pumps, heel slides x10 ea. Seated BLE exercises Hip ext, HS curls x10 ea.   Assessment   Assessment Pt more alert this afternoon compared to morning session; reports that she is feeling better. Pt transferred to recliner with kamlesh steady; performed supine and seated BLE exercises. Tolerated exercises well. continues to have decreased ROM and strength in R LE, possibly due to gout. BP assessed while

## 2024-07-12 NOTE — PROGRESS NOTES
mg  25 mg Oral BID  Guy Stubbs MD   25 mg at 07/10/24 0850    colchicine (COLCRYS) tablet 0.6 mg  0.6 mg Oral Daily Guy Stubbs MD   0.6 mg at 07/11/24 0811    diclofenac sodium (VOLTAREN) 1 % gel 4 g  4 g Topical 4x Daily PRN Guy Stubbs MD        DULoxetine (CYMBALTA) extended release capsule 60 mg  60 mg Oral Daily Guy Stubbs MD   60 mg at 07/11/24 0811    ferrous sulfate (IRON 325) tablet 325 mg  325 mg Oral Daily with breakfast Guy Stubbs MD   325 mg at 07/11/24 0811    folic acid (FOLVITE) tablet 1 mg  1 mg Oral Daily Guy Stubbs MD   1 mg at 07/11/24 0811    [Held by provider] furosemide (LASIX) tablet 40 mg  40 mg Oral Daily Guy Stubbs MD   40 mg at 07/10/24 0850    leflunomide (ARAVA) tablet 20 mg  20 mg Oral Nightly Guy Stubbs MD   20 mg at 07/11/24 2029    levothyroxine (SYNTHROID) tablet 75 mcg  75 mcg Oral Daily Guy Stubbs MD   75 mcg at 07/12/24 0530    lidocaine 4 % external patch 1 patch  1 patch TransDERmal Daily Guy Stubbs MD   1 patch at 07/11/24 0811    losartan (COZAAR) tablet 50 mg  50 mg Oral Daily Guy Stubbs MD   50 mg at 07/11/24 0811    methenamine (HIPREX) tablet 1 g (Patient Supplied)  1 g Oral BID  Guy Stubbs MD   1 g at 07/11/24 1631    therapeutic multivitamin-minerals 1 tablet  1 tablet Oral Daily Guy Stubbs MD   1 tablet at 07/11/24 0811    nitroGLYCERIN (NITROSTAT) SL tablet 0.4 mg  0.4 mg SubLINGual Q5 Min PRN Guy Stubbs MD        oxyCODONE-acetaminophen (PERCOCET) 5-325 MG per tablet 1 tablet  1 tablet Oral Q6H PRN Guy Stubbs MD   1 tablet at 07/12/24 0530    pantoprazole (PROTONIX) tablet 40 mg  40 mg Oral Daily Guy Stubbs MD   40 mg at 07/11/24 0811    predniSONE (DELTASONE) tablet 5 mg  5 mg Oral Daily Guy Stubbs MD   5 mg at 07/11/24 0811    [Held by provider] spironolactone (ALDACTONE) tablet 25 mg  25 mg Oral Daily Guy Stubbs MD   25 mg at 07/10/24 0851    traMADol (ULTRAM) tablet 50  valvular pathology.   Compared to prior exam from 4/24/2022, no significant change. Left Ventricle Left ventricular systolic function is normal. Left ventricular ejection fraction appears to be 61 - 65%. Normal left ventricular cavity size noted. Left ventricular wall thickness is consistent with mild concentric hypertrophy. All left ventricular wall segments contract normally. Left ventricular diastolic function is consistent with (grade I) impaired relaxation and age. No evidence of left ventricular thrombus or mass present. Right Ventricle Normal right ventricular cavity size, wall thickness, systolic function and septal motion noted. Left Atrium The left atrial cavity is mildly dilated. Left atrial volume is mildly increased. Right Atrium Normal right atrial cavity size noted. Mitral Valve The mitral valve is structurally normal with no significant stenosis present. No significant mitral valve regurgitation is present. Tricuspid Valve The tricuspid valve is structurally normal with no significant regurgitation or significant stenosis present. Insufficient TR velocity profile to estimate the right ventricular systolic pressure. Aortic Valve The aortic valve is structurally normal. The aortic valve appears trileaflet. No significant aortic valve regurgitation is present. No hemodynamically significant aortic valve stenosis is present. Pulmonic Valve The pulmonic valve is structurally normal with no regurgitation or significant stenosis present. Pericardium The pericardium is normal. There is no evidence of pericardial effusion. . Greater Vessels No dilation of the aortic root is present. Study Quality The study is technically adequate for diagnosis. The quality of the study is limited due to patient positioning. Study performed with patient sitting upright in chair Normal sinus was the predominant rhythm observed during the procedure. Enhancement Agent Details Verbal consent was obtained from the patient to use

## 2024-07-13 LAB
ACANTHOCYTES BLD QL SMEAR: ABNORMAL
ALBUMIN SERPL-MCNC: 2.7 G/DL (ref 3.5–5.2)
ALP SERPL-CCNC: 95 U/L (ref 35–104)
ALT SERPL-CCNC: 27 U/L (ref 5–33)
ANION GAP SERPL CALCULATED.3IONS-SCNC: 10 MMOL/L (ref 7–19)
AST SERPL-CCNC: 21 U/L (ref 5–32)
BACTERIA UR CULT: ABNORMAL
BASOPHILS # BLD: 0.1 K/UL (ref 0–0.2)
BASOPHILS NFR BLD: 1 % (ref 0–1)
BILIRUB SERPL-MCNC: 0.3 MG/DL (ref 0.2–1.2)
BUN SERPL-MCNC: 17 MG/DL (ref 8–23)
CALCIUM SERPL-MCNC: 8.4 MG/DL (ref 8.8–10.2)
CHLORIDE SERPL-SCNC: 106 MMOL/L (ref 98–111)
CO2 SERPL-SCNC: 25 MMOL/L (ref 22–29)
CREAT SERPL-MCNC: 0.8 MG/DL (ref 0.5–0.9)
DACRYOCYTES BLD QL SMEAR: ABNORMAL
EOSINOPHIL # BLD: 0.3 K/UL (ref 0–0.6)
EOSINOPHIL NFR BLD: 5.1 % (ref 0–5)
ERYTHROCYTE [DISTWIDTH] IN BLOOD BY AUTOMATED COUNT: 20.7 % (ref 11.5–14.5)
GLUCOSE BLD-MCNC: 140 MG/DL (ref 70–99)
GLUCOSE BLD-MCNC: 141 MG/DL (ref 70–99)
GLUCOSE BLD-MCNC: 151 MG/DL (ref 70–99)
GLUCOSE BLD-MCNC: 91 MG/DL (ref 70–99)
GLUCOSE SERPL-MCNC: 102 MG/DL (ref 74–109)
HCT VFR BLD AUTO: 28.8 % (ref 37–47)
HGB BLD-MCNC: 8.2 G/DL (ref 12–16)
HYPOCHROMIA BLD QL SMEAR: ABNORMAL
IMM GRANULOCYTES # BLD: 0.2 K/UL
LYMPHOCYTES # BLD: 2 K/UL (ref 1.1–4.5)
LYMPHOCYTES NFR BLD: 32.2 % (ref 20–40)
MAGNESIUM SERPL-MCNC: 2.1 MG/DL (ref 1.6–2.4)
MCH RBC QN AUTO: 28.1 PG (ref 27–31)
MCHC RBC AUTO-ENTMCNC: 28.5 G/DL (ref 33–37)
MCV RBC AUTO: 98.6 FL (ref 81–99)
MONOCYTES # BLD: 1.2 K/UL (ref 0–0.9)
MONOCYTES NFR BLD: 19 % (ref 0–10)
NEUTROPHILS # BLD: 2.5 K/UL (ref 1.5–7.5)
NEUTS SEG NFR BLD: 40.1 % (ref 50–65)
ORGANISM: ABNORMAL
ORGANISM: ABNORMAL
PERFORMED ON: ABNORMAL
PERFORMED ON: NORMAL
PLATELET # BLD AUTO: 182 K/UL (ref 130–400)
PLATELET SLIDE REVIEW: ADEQUATE
PMV BLD AUTO: 10.7 FL (ref 9.4–12.3)
POIKILOCYTOSIS BLD QL SMEAR: ABNORMAL
POTASSIUM SERPL-SCNC: 4.5 MMOL/L (ref 3.5–5)
POTASSIUM SERPL-SCNC: 5.7 MMOL/L (ref 3.5–5)
PROT SERPL-MCNC: 5.9 G/DL (ref 6.6–8.7)
RBC # BLD AUTO: 2.92 M/UL (ref 4.2–5.4)
SCHISTOCYTES BLD QL SMEAR: ABNORMAL
SODIUM SERPL-SCNC: 141 MMOL/L (ref 136–145)
WBC # BLD AUTO: 6.3 K/UL (ref 4.8–10.8)

## 2024-07-13 PROCEDURE — 94760 N-INVAS EAR/PLS OXIMETRY 1: CPT

## 2024-07-13 PROCEDURE — 80053 COMPREHEN METABOLIC PANEL: CPT

## 2024-07-13 PROCEDURE — 97110 THERAPEUTIC EXERCISES: CPT

## 2024-07-13 PROCEDURE — 97530 THERAPEUTIC ACTIVITIES: CPT

## 2024-07-13 PROCEDURE — 86403 PARTICLE AGGLUT ANTBDY SCRN: CPT

## 2024-07-13 PROCEDURE — 99232 SBSQ HOSP IP/OBS MODERATE 35: CPT | Performed by: PSYCHIATRY & NEUROLOGY

## 2024-07-13 PROCEDURE — 87070 CULTURE OTHR SPECIMN AEROBIC: CPT

## 2024-07-13 PROCEDURE — 83735 ASSAY OF MAGNESIUM: CPT

## 2024-07-13 PROCEDURE — 82962 GLUCOSE BLOOD TEST: CPT

## 2024-07-13 PROCEDURE — 1180000000 HC REHAB R&B

## 2024-07-13 PROCEDURE — 6370000000 HC RX 637 (ALT 250 FOR IP): Performed by: PSYCHIATRY & NEUROLOGY

## 2024-07-13 PROCEDURE — 6360000002 HC RX W HCPCS

## 2024-07-13 PROCEDURE — 85025 COMPLETE CBC W/AUTO DIFF WBC: CPT

## 2024-07-13 PROCEDURE — 87186 SC STD MICRODIL/AGAR DIL: CPT

## 2024-07-13 PROCEDURE — 84132 ASSAY OF SERUM POTASSIUM: CPT

## 2024-07-13 PROCEDURE — 2580000003 HC RX 258

## 2024-07-13 PROCEDURE — 36415 COLL VENOUS BLD VENIPUNCTURE: CPT

## 2024-07-13 PROCEDURE — 87077 CULTURE AEROBIC IDENTIFY: CPT

## 2024-07-13 PROCEDURE — 87205 SMEAR GRAM STAIN: CPT

## 2024-07-13 RX ADMIN — ALLOPURINOL 100 MG: 100 TABLET ORAL at 08:51

## 2024-07-13 RX ADMIN — DULOXETINE HYDROCHLORIDE 60 MG: 60 CAPSULE, DELAYED RELEASE ORAL at 08:51

## 2024-07-13 RX ADMIN — MICONAZOLE NITRATE: 2 POWDER TOPICAL at 08:57

## 2024-07-13 RX ADMIN — ASPIRIN 81 MG: 81 TABLET, COATED ORAL at 08:51

## 2024-07-13 RX ADMIN — CEFEPIME 2000 MG: 2 INJECTION, POWDER, FOR SOLUTION INTRAVENOUS at 21:37

## 2024-07-13 RX ADMIN — FOLIC ACID 1 MG: 1 TABLET ORAL at 08:51

## 2024-07-13 RX ADMIN — PREDNISONE 5 MG: 5 TABLET ORAL at 08:51

## 2024-07-13 RX ADMIN — Medication 500 MG: at 08:51

## 2024-07-13 RX ADMIN — APIXABAN 5 MG: 5 TABLET, FILM COATED ORAL at 08:51

## 2024-07-13 RX ADMIN — CEFEPIME 2000 MG: 2 INJECTION, POWDER, FOR SOLUTION INTRAVENOUS at 09:14

## 2024-07-13 RX ADMIN — APIXABAN 5 MG: 5 TABLET, FILM COATED ORAL at 21:32

## 2024-07-13 RX ADMIN — FERROUS SULFATE TAB 325 MG (65 MG ELEMENTAL FE) 325 MG: 325 (65 FE) TAB at 08:51

## 2024-07-13 RX ADMIN — COLCHICINE 0.6 MG: 0.6 TABLET, FILM COATED ORAL at 08:51

## 2024-07-13 RX ADMIN — Medication: at 16:45

## 2024-07-13 RX ADMIN — METHENAMINE HIPPURATE 1 G: 1000 TABLET ORAL at 08:51

## 2024-07-13 RX ADMIN — LEVOTHYROXINE SODIUM 75 MCG: 75 TABLET ORAL at 05:50

## 2024-07-13 RX ADMIN — LOSARTAN POTASSIUM 50 MG: 50 TABLET, FILM COATED ORAL at 08:51

## 2024-07-13 RX ADMIN — PANTOPRAZOLE SODIUM 40 MG: 40 TABLET, DELAYED RELEASE ORAL at 08:51

## 2024-07-13 RX ADMIN — METHENAMINE HIPPURATE 1 G: 1000 TABLET ORAL at 17:02

## 2024-07-13 RX ADMIN — Medication 1 TABLET: at 08:51

## 2024-07-13 RX ADMIN — LEFLUNOMIDE 20 MG: 20 TABLET ORAL at 21:32

## 2024-07-13 ASSESSMENT — PAIN DESCRIPTION - DESCRIPTORS: DESCRIPTORS: ACHING

## 2024-07-13 ASSESSMENT — PAIN DESCRIPTION - ORIENTATION: ORIENTATION: LEFT;RIGHT

## 2024-07-13 ASSESSMENT — PAIN DESCRIPTION - LOCATION: LOCATION: FOOT

## 2024-07-13 ASSESSMENT — PAIN SCALES - GENERAL: PAINLEVEL_OUTOF10: 2

## 2024-07-13 NOTE — PROGRESS NOTES
multivitamin-minerals 1 tablet, 1 tablet, Oral, Daily, Guy Stubbs MD, 1 tablet at 07/09/24 0915    nitroGLYCERIN (NITROSTAT) SL tablet 0.4 mg, 0.4 mg, SubLINGual, Q5 Min PRN, Guy Stubbs MD    oxyCODONE-acetaminophen (PERCOCET) 5-325 MG per tablet 1 tablet, 1 tablet, Oral, Q6H PRN, Guy Stubbs MD    pantoprazole (PROTONIX) tablet 40 mg, 40 mg, Oral, Daily, Guy Stubbs MD, 40 mg at 07/09/24 0915    predniSONE (DELTASONE) tablet 5 mg, 5 mg, Oral, Daily, Guy Stubbs MD, 5 mg at 07/09/24 0915    spironolactone (ALDACTONE) tablet 25 mg, 25 mg, Oral, Daily, Guy Stubbs MD, 25 mg at 07/09/24 0915    traMADol (ULTRAM) tablet 50 mg, 50 mg, Oral, Q12H PRN, Guy Stubbs MD, 50 mg at 07/09/24 2011    vitamin C tablet 500 mg, 500 mg, Oral, Daily, Guy Stubbs MD, 500 mg at 07/09/24 0916    acetaminophen (TYLENOL) tablet 650 mg, 650 mg, Oral, Q4H PRN, Guy Stubbs MD    polyethylene glycol (GLYCOLAX) packet 17 g, 17 g, Oral, Daily PRN, Guy Stubbs MD    insulin lispro (HUMALOG,ADMELOG) injection vial 0-4 Units, 0-4 Units, SubCUTAneous, TID WC, Guy Stubbs MD    insulin lispro (HUMALOG,ADMELOG) injection vial 0-4 Units, 0-4 Units, SubCUTAneous, Nightly, Guy Stubbs MD    glucose chewable tablet 16 g, 4 tablet, Oral, PRN, Guy Stubbs MD    dextrose bolus 10% 125 mL, 125 mL, IntraVENous, PRN **OR** dextrose bolus 10% 250 mL, 250 mL, IntraVENous, PRN, Guy Stubbs MD    glucagon injection 1 mg, 1 mg, SubCUTAneous, PRN, Guy Stubbs MD    dextrose 10 % infusion, , IntraVENous, Continuous PRN, Guy Stubbs MD    isosorbide mononitrate (IMDUR) extended release tablet 60 mg, 60 mg, Oral, Daily, Guy Stubbs MD, 60 mg at 07/09/24 0916    miconazole (MICOTIN) 2 % powder, , Topical, BID, Guy Stubbs MD, Given at 07/09/24 1328    [START ON 7/11/2024] estradiol (ESTRACE) vaginal cream 2 g (Patient Supplied), 2 g, Vaginal, Once per day on Mon Thu, Guy Stubbs,      Lab Results   Component Value Date     07/09/2024    K 4.4 07/09/2024     07/09/2024    CO2 24 07/09/2024    BUN 17 07/09/2024    CREATININE 0.8 07/09/2024    GLUCOSE 86 07/09/2024    CALCIUM 8.4 (L) 07/09/2024    BILITOT 0.4 07/09/2024    ALKPHOS 89 07/09/2024    AST 28 07/09/2024    ALT 26 07/09/2024    LABGLOM 79 07/09/2024    GFRAA >59 11/01/2021     Lab Results   Component Value Date    INR 1.50 (H) 10/28/2021    INR 1.20 (H) 12/05/2018    INR 1.14 09/04/2018    PROTIME 18.2 (H) 10/28/2021    PROTIME 15.2 (H) 12/05/2018    PROTIME 14.5 09/04/2018       Zabrina Au SPT  Student Physical Therapist  Physical Therapy     Progress Notes      Cosign Needed     Date of Service: 7/11/2024  1:58 PM     Cosign Needed              07/11/24 1300   Restrictions/Precautions   Restrictions/Precautions Fall Risk;Isolation   General   Chart Reviewed Yes   Additional Pertinent Hx RA, pacemaker, CAD, MI, DVT, CHF, neurogenic bladder, HTN, charcot's joint, psoriasis vulgaris, osteoporosis, hypothyroidism, cervical spinal stenosis   Diagnosis Acute cholecystitis   Vitals   /65   MAP (Calculated) 86   BP Location Left upper arm   BP Method Automatic   Patient Position Sitting   Bed mobility   Supine to Sit Moderate assistance   Scooting Moderate assistance   Transfers   Sit to Stand Maximum Assistance;Moderate Assistance   Stand to Sit Maximum Assistance;Moderate Assistance   Bed to Chair Dependent/Total;Maximum assistance  (kamlesh steady)   PT Exercises   Exercise Treatment supine BLE exercises; hip ABD/ADD, ankle pumps, heel slides x10 ea. Seated BLE exercises Hip ext, HS curls x10 ea.   Assessment   Assessment Pt more alert this afternoon compared to morning session; reports that she is feeling better. Pt transferred to recliner with kamlesh steady; performed supine and seated BLE exercises. Tolerated exercises well. continues to have decreased ROM and strength in R LE, possibly due to gout. BP assessed while

## 2024-07-13 NOTE — PROGRESS NOTES
Physical Therapy  Name: Kelsey Morejon  MRN:  [id number]  Date of service:  7/13/2024 07/13/24 1345   Restrictions/Precautions   Restrictions/Precautions Fall Risk;Isolation   General   Additional Pertinent Hx RA, pacemaker, CAD, MI, DVT, CHF, neurogenic bladder, HTN, charcot's joint, psoriasis vulgaris, osteoporosis, hypothyroidism, cervical spinal stenosis   Diagnosis Acute cholecystitis   General Comment   Comments sitting in power chair since OT   Subjective   Subjective Pt agreeable and without complaint.   Subjective   Subjective denies any pain   Transfers   Sit to Stand Contact guard assistance  (pulling up to SS)   Stand to Sit Contact guard assistance   Bed to Chair Dependent/Total  (transfer bed to recliner via SS)   PT Exercises   A/AROM Exercises R eccentric knee ext x 10; R LAQ x 10 (very small range), x 2 sets of 10 R hip flex, B ADD sets with ball   Resistive Exercises x 2 sets of 10 L LAQ, L hip flex and B HS curls and B hip abd with man resist   Static Standing Balance Exercises standing in SS x  30\" stby@   Assessment   Assessment Pt sitting tolerance improved and she has been in chair most of day. Pt accepting resist B LE though limited on R and range not complete. Pt gives good effort with all and voices no complaints during session.   Activity Tolerance Comment Recommend progression to RW next session to work towards transfers for home   Safety Devices   Type of Devices Call light within reach;Left in chair   PT Individual Minutes   Time In 1345   Time Out 1420   Minutes 35         Electronically signed by Brissa Wood PTA on 7/13/2024 at 3:01 PM

## 2024-07-13 NOTE — PROGRESS NOTES
Occupational Therapy  Facility/Department: [facility] 8 REHAB UNIT  Rehabilitation Occupational Therapy Daily Treatment Note    Date: 24  Patient Name: Kelsey Morejon       Room: 0815/815-02  MRN: [id number]  Account: [id number]   : 1955  (68 y.o.) Gender: female        Diagnosis: Acute cholecystitis, s/p cholecystectomy       Treatment Diagnosis: Acute cholecystitis, s/p cholecystectomy   Past Medical History:  has a past medical history of CAD (coronary artery disease), Cellulitis, History of blood transfusion, History of DVT (deep vein thrombosis), History of neutropenia, History of pulmonary fibrosis, Hx of blood clots, Hypercholesterolemia, Hypertension, Intrinsic asthma, Pacemaker, Post op infection, Rheumatoid arteritis (HCC), Sinus node dysfunction (HCC), Staph aureus infection, Status post placement of implantable loop recorder, Syncope, and Thyroid disease.  Past Surgical History:   has a past surgical history that includes Diagnostic Cardiac Cath Lab Procedure; LEEP; Coronary angioplasty with stent (); Pacemaker insertion (2016); back surgery (2018); pr arthrp kne condyle&platu medial&lat compartments (Right, 2018); pr i&d deep absc bursa/hematoma thigh/knee region (Right, 10/23/2018); Cardiac catheterization (14  MDL); Cardiac catheterization (1/26/15  MDL); pacemaker placement; joint replacement; and Revision total knee arthroplasty (Right, 2018).    Restrictions  Restrictions/Precautions: Fall Risk, Isolation    Subjective     24 1300   General   Family / Caregiver Present Yes   Diagnosis Acute cholecystitis, s/p cholecystectomy   Pain Screening   Pain at present 0   Scale Used Numeric Score     Objective     24 1300   Balance   Sitting Balance Stand by assistance   Standing Balance Dependent/Total  (using kamlesh ko)   Functional Mobility   Functional - Mobility Device Wheelchair  (PWC)   Activity To/From therapy gym   Assist Level Supervision

## 2024-07-13 NOTE — PLAN OF CARE
Problem: Discharge Planning  Goal: Discharge to home or other facility with appropriate resources  7/12/2024 2244 by Malorie Hua RN  Outcome: Progressing  7/12/2024 1203 by Myriam Evans RN  Outcome: Progressing     Problem: Skin/Tissue Integrity  Goal: Absence of new skin breakdown  Description: 1.  Monitor for areas of redness and/or skin breakdown  2.  Assess vascular access sites hourly  3.  Every 4-6 hours minimum:  Change oxygen saturation probe site  4.  Every 4-6 hours:  If on nasal continuous positive airway pressure, respiratory therapy assess nares and determine need for appliance change or resting period.  7/12/2024 2244 by Malorie Hua RN  Outcome: Progressing  7/12/2024 1203 by Myriam Evans RN  Outcome: Progressing     Problem: Safety - Adult  Goal: Free from fall injury  7/12/2024 2244 by Malorie Hua RN  Outcome: Progressing  7/12/2024 1203 by Myriam Evans RN  Outcome: Progressing     Problem: ABCDS Injury Assessment  Goal: Absence of physical injury  7/12/2024 2244 by Malorie Hua RN  Outcome: Progressing  7/12/2024 1203 by Myriam Evans RN  Outcome: Progressing     Problem: Nutrition Deficit:  Goal: Optimize nutritional status  7/12/2024 2244 by Malorie Hua RN  Outcome: Progressing  7/12/2024 1203 by Myriam Evans RN  Outcome: Progressing     Problem: Pain  Goal: Verbalizes/displays adequate comfort level or baseline comfort level  7/12/2024 2244 by Malorie Hua RN  Outcome: Progressing  7/12/2024 1203 by Myriam Evans RN  Outcome: Progressing

## 2024-07-13 NOTE — PROGRESS NOTES
Occupational Therapy  Facility/Department: [facility] 8 REHAB UNIT  Daily Treatment Note  NAME: Kelsey Morejon  : 1955  MRN: [id number]    Date of Service: 2024    Discharge Recommendations:  Continue to assess pending progress       Assessment   Performance deficits / Impairments: Decreased functional mobility ;Decreased ADL status;Decreased strength;Decreased endurance;Decreased balance;Decreased high-level IADLs  Assessment: Pt tolerated tx well.  Pt transfered from recliner to electric w/c with SS.  Pt participated with checo UE strengthening exercises in OT gym.  Pt practiced stand pivot transfer from electric w/c to recliner with RW and max assist.  Pt needs more LE strengthening to transfer safely with RW or assist x 2 for transfers with RW.  Pt continues to benefit from skilled OT services.  Treatment Diagnosis: Acute cholecystitis, s/p cholecystectomy  Prognosis: Good  Activity Tolerance  Activity Tolerance: Patient Tolerated treatment well  Activity Tolerance Comments: .         Patient Diagnosis(es): There were no encounter diagnoses.      has a past medical history of CAD (coronary artery disease), Cellulitis, History of blood transfusion, History of DVT (deep vein thrombosis), History of neutropenia, History of pulmonary fibrosis, Hx of blood clots, Hypercholesterolemia, Hypertension, Intrinsic asthma, Pacemaker, Post op infection, Rheumatoid arteritis (HCC), Sinus node dysfunction (HCC), Staph aureus infection, Status post placement of implantable loop recorder, Syncope, and Thyroid disease.   has a past surgical history that includes Diagnostic Cardiac Cath Lab Procedure; LEEP; Coronary angioplasty with stent (); Pacemaker insertion (2016); back surgery (2018); pr arthrp kne condyle&platu medial&lat compartments (Right, 2018); pr i&d deep absc bursa/hematoma thigh/knee region (Right, 10/23/2018); Cardiac catheterization (14  MDL); Cardiac catheterization (1/26/15  MDL);  pacemaker placement; joint replacement; and Revision total knee arthroplasty (Right, 12/6/2018).    Restrictions  Restrictions/Precautions  Restrictions/Precautions: Fall Risk, Isolation  Subjective   General  Chart Reviewed: Yes  Patient assessed for rehabilitation services?: Yes  Response to previous treatment: Patient with no complaints from previous session  Family / Caregiver Present: Yes  Diagnosis: Acute cholecystitis, s/p cholecystectomy  General Comment  Comments: .  Pain Screening  Pain at present: 0  Scale Used: Numeric Score  Intervention List: Patient able to continue with treatment   Orientation     Objective     OT Exercises:             2# FW, 10 reps, 1 set, all planes   Transfers  Stand Pivot Transfers: Maximum assistance (Max assist with use of RW.)  Sit to stand: Maximum assistance  Stand to sit: Moderate assistance  Transfer Comments: SS from recliner to electric w/c, Max assist stand pivot from electric w/c to recliner with rw.                                                           Plan   Occupational Therapy Plan  Specific Instructions for Next Treatment: AE for footwear  Current Treatment Recommendations: Strengthening, Balance training, Functional mobility training, Patient/Caregiver education & training, Equipment evaluation, education, & procurement, Safety education & training, Self-Care / ADL, Home management training  Goals  Short Term Goals  Time Frame for Short Term Goals: 2 weeks  Short Term Goal 1: Mod A with LB dressing, AE PRN.  Short Term Goal 2: Mod A with donning/doffing socks and shoes, AE PRN.  Short Term Goal 3: MET  Short Term Goal 4: Patient will complete toileting with Max A.  Short Term Goal 5: Patient will complete toilet transfers with Max A.  Long Term Goals  Time Frame for Long Term Goals : 3-4 weeks  Long Term Goal 1: Setup with UB dressing.  Long Term Goal 2: Min A with LB dressing.  Long Term Goal 3: Min A with donning/doffing socks and shoes.  Long Term Goal

## 2024-07-13 NOTE — PROGRESS NOTES
Physical Therapy  Name: Kelsey Morejon  MRN:  [id number]  Date of service:  7/13/2024 07/13/24 0900   General   Patient assessed for rehabilitation services? Yes   General Comment   Comments in bed, nsg present   Subjective   Subjective Pt agreeable and pleasant. No complaints.   Vitals   Pulse (!) 103   Heart Rate Source Monitor   /78   MAP (Calculated) 98   BP Location Right upper arm   BP Method Automatic   Patient Position Sitting   Subjective   Subjective denies any pain   Transfers   Sit to Stand Contact guard assistance;Minimal Assistance  (STS from bed to SS pulling up to bar)   Stand to Sit Contact guard assistance;Minimal Assistance  (lowering to paddles or chair from SS platform with UEs on bar)   Bed to Chair Dependent/Total  (transfer bed to recliner via SS)   PT Exercises   A/AROM Exercises 2 sets of 10: ADD sets, QS, HS, hip abd/add, R SAQ (limited range); x 10 heel towards C/L knee (limited range R and AA)   Resistive Exercises man resist x 2 sets of 10: B hip ext, L SAQ, L HS curls; hooklying: hip abd x 20 with man resist, bridging x 10 (small range), LTR x 20 ea with man resist   Static Standing Balance Exercises standing in SS x 4 for 30\" at best time cg@ (arms fatigue and tremble)   Assessment   Assessment Pt able to michael resist for some LE ex though R weaker and sometimes needs assist and only limited range. Cont with low standing endurance and need for SS to transfer to chair though able to come to stand with less physical assist.   Safety Devices   Type of Devices Call light within reach;Left in chair   PT Individual Minutes   Time In 0900   Time Out 0955   Minutes 55         Electronically signed by Brissa Wood PTA on 7/13/2024 at 11:08 AM

## 2024-07-14 LAB
ALBUMIN SERPL-MCNC: 2.5 G/DL (ref 3.5–5.2)
ALP SERPL-CCNC: 89 U/L (ref 35–104)
ALT SERPL-CCNC: 26 U/L (ref 5–33)
ANION GAP SERPL CALCULATED.3IONS-SCNC: 11 MMOL/L (ref 7–19)
AST SERPL-CCNC: 19 U/L (ref 5–32)
BASOPHILS # BLD: 0.1 K/UL (ref 0–0.2)
BASOPHILS NFR BLD: 1 % (ref 0–1)
BILIRUB SERPL-MCNC: 0.3 MG/DL (ref 0.2–1.2)
BUN SERPL-MCNC: 15 MG/DL (ref 8–23)
CALCIUM SERPL-MCNC: 7.9 MG/DL (ref 8.8–10.2)
CHLORIDE SERPL-SCNC: 107 MMOL/L (ref 98–111)
CO2 SERPL-SCNC: 20 MMOL/L (ref 22–29)
CREAT SERPL-MCNC: 0.8 MG/DL (ref 0.5–0.9)
EOSINOPHIL # BLD: 0.2 K/UL (ref 0–0.6)
EOSINOPHIL NFR BLD: 4 % (ref 0–5)
ERYTHROCYTE [DISTWIDTH] IN BLOOD BY AUTOMATED COUNT: 20.8 % (ref 11.5–14.5)
GLUCOSE BLD-MCNC: 139 MG/DL (ref 70–99)
GLUCOSE BLD-MCNC: 154 MG/DL (ref 70–99)
GLUCOSE BLD-MCNC: 157 MG/DL (ref 70–99)
GLUCOSE BLD-MCNC: 89 MG/DL (ref 70–99)
GLUCOSE SERPL-MCNC: 101 MG/DL (ref 74–109)
HCT VFR BLD AUTO: 27 % (ref 37–47)
HGB BLD-MCNC: 7.9 G/DL (ref 12–16)
IMM GRANULOCYTES # BLD: 0.2 K/UL
LYMPHOCYTES # BLD: 1.9 K/UL (ref 1.1–4.5)
LYMPHOCYTES NFR BLD: 33.7 % (ref 20–40)
MAGNESIUM SERPL-MCNC: 2 MG/DL (ref 1.6–2.4)
MCH RBC QN AUTO: 28.5 PG (ref 27–31)
MCHC RBC AUTO-ENTMCNC: 29.3 G/DL (ref 33–37)
MCV RBC AUTO: 97.5 FL (ref 81–99)
MONOCYTES # BLD: 0.9 K/UL (ref 0–0.9)
MONOCYTES NFR BLD: 16.4 % (ref 0–10)
NEUTROPHILS # BLD: 2.4 K/UL (ref 1.5–7.5)
NEUTS SEG NFR BLD: 41.2 % (ref 50–65)
PERFORMED ON: ABNORMAL
PERFORMED ON: NORMAL
PLATELET # BLD AUTO: 178 K/UL (ref 130–400)
PMV BLD AUTO: 11.6 FL (ref 9.4–12.3)
POTASSIUM SERPL-SCNC: 4.5 MMOL/L (ref 3.5–5)
PROT SERPL-MCNC: 5.7 G/DL (ref 6.6–8.7)
RBC # BLD AUTO: 2.77 M/UL (ref 4.2–5.4)
SODIUM SERPL-SCNC: 138 MMOL/L (ref 136–145)
WBC # BLD AUTO: 5.7 K/UL (ref 4.8–10.8)

## 2024-07-14 PROCEDURE — 6370000000 HC RX 637 (ALT 250 FOR IP): Performed by: PSYCHIATRY & NEUROLOGY

## 2024-07-14 PROCEDURE — 1180000000 HC REHAB R&B

## 2024-07-14 PROCEDURE — 99232 SBSQ HOSP IP/OBS MODERATE 35: CPT | Performed by: PSYCHIATRY & NEUROLOGY

## 2024-07-14 PROCEDURE — 83735 ASSAY OF MAGNESIUM: CPT

## 2024-07-14 PROCEDURE — 94760 N-INVAS EAR/PLS OXIMETRY 1: CPT

## 2024-07-14 PROCEDURE — 2580000003 HC RX 258

## 2024-07-14 PROCEDURE — 85025 COMPLETE CBC W/AUTO DIFF WBC: CPT

## 2024-07-14 PROCEDURE — 80053 COMPREHEN METABOLIC PANEL: CPT

## 2024-07-14 PROCEDURE — 6360000002 HC RX W HCPCS

## 2024-07-14 PROCEDURE — 82962 GLUCOSE BLOOD TEST: CPT

## 2024-07-14 PROCEDURE — 36415 COLL VENOUS BLD VENIPUNCTURE: CPT

## 2024-07-14 RX ADMIN — COLCHICINE 0.6 MG: 0.6 TABLET, FILM COATED ORAL at 08:03

## 2024-07-14 RX ADMIN — LEFLUNOMIDE 20 MG: 20 TABLET ORAL at 20:21

## 2024-07-14 RX ADMIN — TRAMADOL HYDROCHLORIDE 50 MG: 50 TABLET ORAL at 20:26

## 2024-07-14 RX ADMIN — METHENAMINE HIPPURATE 1 G: 1000 TABLET ORAL at 08:02

## 2024-07-14 RX ADMIN — CEFEPIME 2000 MG: 2 INJECTION, POWDER, FOR SOLUTION INTRAVENOUS at 08:13

## 2024-07-14 RX ADMIN — LOSARTAN POTASSIUM 50 MG: 50 TABLET, FILM COATED ORAL at 08:03

## 2024-07-14 RX ADMIN — Medication 500 MG: at 08:03

## 2024-07-14 RX ADMIN — ALLOPURINOL 100 MG: 100 TABLET ORAL at 08:03

## 2024-07-14 RX ADMIN — MICONAZOLE NITRATE: 2 POWDER TOPICAL at 20:34

## 2024-07-14 RX ADMIN — CEFEPIME 2000 MG: 2 INJECTION, POWDER, FOR SOLUTION INTRAVENOUS at 20:40

## 2024-07-14 RX ADMIN — Medication 1 TABLET: at 08:03

## 2024-07-14 RX ADMIN — LEVOTHYROXINE SODIUM 75 MCG: 75 TABLET ORAL at 05:42

## 2024-07-14 RX ADMIN — PREDNISONE 5 MG: 5 TABLET ORAL at 08:03

## 2024-07-14 RX ADMIN — FERROUS SULFATE TAB 325 MG (65 MG ELEMENTAL FE) 325 MG: 325 (65 FE) TAB at 08:03

## 2024-07-14 RX ADMIN — FOLIC ACID 1 MG: 1 TABLET ORAL at 08:03

## 2024-07-14 RX ADMIN — METHENAMINE HIPPURATE 1 G: 1000 TABLET ORAL at 16:52

## 2024-07-14 RX ADMIN — APIXABAN 5 MG: 5 TABLET, FILM COATED ORAL at 20:21

## 2024-07-14 RX ADMIN — Medication: at 08:18

## 2024-07-14 RX ADMIN — ASPIRIN 81 MG: 81 TABLET, COATED ORAL at 08:03

## 2024-07-14 RX ADMIN — DULOXETINE HYDROCHLORIDE 60 MG: 60 CAPSULE, DELAYED RELEASE ORAL at 08:03

## 2024-07-14 RX ADMIN — APIXABAN 5 MG: 5 TABLET, FILM COATED ORAL at 08:03

## 2024-07-14 RX ADMIN — PANTOPRAZOLE SODIUM 40 MG: 40 TABLET, DELAYED RELEASE ORAL at 08:03

## 2024-07-14 RX ADMIN — MICONAZOLE NITRATE: 2 POWDER TOPICAL at 08:19

## 2024-07-14 ASSESSMENT — PAIN DESCRIPTION - PAIN TYPE: TYPE: CHRONIC PAIN

## 2024-07-14 ASSESSMENT — PAIN DESCRIPTION - FREQUENCY: FREQUENCY: INTERMITTENT

## 2024-07-14 ASSESSMENT — PAIN SCALES - GENERAL
PAINLEVEL_OUTOF10: 0
PAINLEVEL_OUTOF10: 6

## 2024-07-14 ASSESSMENT — PAIN DESCRIPTION - LOCATION: LOCATION: FOOT

## 2024-07-14 ASSESSMENT — PAIN DESCRIPTION - ORIENTATION: ORIENTATION: LEFT;RIGHT

## 2024-07-14 ASSESSMENT — PAIN DESCRIPTION - ONSET: ONSET: GRADUAL

## 2024-07-14 ASSESSMENT — PAIN - FUNCTIONAL ASSESSMENT: PAIN_FUNCTIONAL_ASSESSMENT: ACTIVITIES ARE NOT PREVENTED

## 2024-07-14 ASSESSMENT — PAIN DESCRIPTION - DESCRIPTORS: DESCRIPTORS: ACHING

## 2024-07-14 NOTE — PLAN OF CARE
Problem: Discharge Planning  Goal: Discharge to home or other facility with appropriate resources  Outcome: Progressing  Flowsheets (Taken 7/14/2024 0800)  Discharge to home or other facility with appropriate resources: Refer to discharge planning if patient needs post-hospital services based on physician order or complex needs related to functional status, cognitive ability or social support system     Problem: Skin/Tissue Integrity  Goal: Absence of new skin breakdown  Description: 1.  Monitor for areas of redness and/or skin breakdown  2.  Assess vascular access sites hourly  3.  Every 4-6 hours minimum:  Change oxygen saturation probe site  4.  Every 4-6 hours:  If on nasal continuous positive airway pressure, respiratory therapy assess nares and determine need for appliance change or resting period.  Outcome: Progressing     Problem: Safety - Adult  Goal: Free from fall injury  Outcome: Progressing     Problem: ABCDS Injury Assessment  Goal: Absence of physical injury  Outcome: Progressing     Problem: Nutrition Deficit:  Goal: Optimize nutritional status  Outcome: Progressing     Problem: Pain  Goal: Verbalizes/displays adequate comfort level or baseline comfort level  Outcome: Progressing

## 2024-07-14 NOTE — PLAN OF CARE
Problem: Skin/Tissue Integrity  Goal: Absence of new skin breakdown  Description: 1.  Monitor for areas of redness and/or skin breakdown  2.  Assess vascular access sites hourly  3.  Every 4-6 hours minimum:  Change oxygen saturation probe site  4.  Every 4-6 hours:  If on nasal continuous positive airway pressure, respiratory therapy assess nares and determine need for appliance change or resting period.  7/13/2024 1956 by Malorie Hua RN  Outcome: Progressing  7/13/2024 1427 by Myriam Evans RN  Outcome: Progressing     Problem: Discharge Planning  Goal: Discharge to home or other facility with appropriate resources  7/13/2024 1956 by Malorie Hua RN  Outcome: Progressing  Flowsheets (Taken 7/13/2024 1945)  Discharge to home or other facility with appropriate resources: Refer to discharge planning if patient needs post-hospital services based on physician order or complex needs related to functional status, cognitive ability or social support system  7/13/2024 1427 by Myriam Evans, RN  Outcome: Progressing     Problem: Safety - Adult  Goal: Free from fall injury  7/13/2024 1956 by Malorie Hua RN  Outcome: Progressing  7/13/2024 1427 by Myriam Evans, RN  Outcome: Progressing     Problem: ABCDS Injury Assessment  Goal: Absence of physical injury  7/13/2024 1956 by Malorie Hua RN  Outcome: Progressing  7/13/2024 1427 by Myriam Evans RN  Outcome: Progressing     Problem: Nutrition Deficit:  Goal: Optimize nutritional status  7/13/2024 1956 by Malorie Hua RN  Outcome: Progressing  7/13/2024 1427 by Myriam Evans, RN  Outcome: Progressing     Problem: Pain  Goal: Verbalizes/displays adequate comfort level or baseline comfort level  7/13/2024 1956 by Malorie Hua RN  Outcome: Progressing  7/13/2024 1427 by Myriam Evans RN  Outcome: Progressing

## 2024-07-14 NOTE — PROGRESS NOTES
Patient:   Kelsey Morejon  MR#:    [id number]   Room:    [facility]/815-02   YOB: 1955  Date of Progress Note: 7/14/2024  Time of Note                           7:57 AM  Consulting Physician:   Guy Stubbs M.D.  Attending Physician:  Guy Stubbs MD     Chief complaint Status post cholecystectomy     S:This 68 y.o. 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 Patient admitted on 6/13/2024 with AMS. She was found to have UTI  and left ureteral calculus with obstruction and acute hypoxic respiratory failure. She has been treated with Cefepime. Lithotripsy performed on 6/21/2024. GI was consulted on 6/21/2024 for abdominal pain. GB US showed cholelithiasis with small stones and sludge. HIDA scan done showed Non visualization of the gallbladder and could not rule out cystic duct obstruction.  On 07/03/24 she underwent a CHOLECYSTECTOMY LAPAROSCOPIC WITH INTRAOPERATIVE CHOLANGIOGRAM by   Lexa Waddell MD. She tolerated procedure well.   She does have a wound on her R buttock from cyst removal by Dr. Waddell. The wound has opened up and has been dressed w/ wet-to-dry dressings by Home Health. Wound measures 1.2cm x 2 cm x 2.5. Undermining circumferentially reaching 5cm at 4 o'clock. Subcutaneous tissue, wound bed. Small amount of serosanguineous drainage. Aixa wound area is slight erythema but blanchable.  She was complaining about some difficulty swallowing. Speech evaluated her. She is now tolerating a regular diet, thin liquids with meds whole in applesauce.   She is now medically stable and is participating with therapy. She is ready to begin rehab with goal of returning home after rehab dc with support from her caregivers and family.  Previously syncope after large bowel movement.  Was hypotensive.  Noted to have urinary tract infection.  Fluid bolus was given and medications were adjusted.

## 2024-07-14 NOTE — PLAN OF CARE
Problem: Discharge Planning  Goal: Discharge to home or other facility with appropriate resources  Recent Flowsheet Documentation  Taken 7/14/2024 0800 by Marycarmen Mott RN  Discharge to home or other facility with appropriate resources: Refer to discharge planning if patient needs post-hospital services based on physician order or complex needs related to functional status, cognitive ability or social support system

## 2024-07-14 NOTE — PROGRESS NOTES
unremarkable. Images reviewed in bone window show no displaced acute skull fracture. There is mucosal thickening involving the ethmoid maxillary and sphenoid sinuses with left-sided mastoid effusion. Frontal sinuses are poorly developed.    1. No acute intracranial abnormality. 2. Chronic sinusitis and left mastoid effusion. The above study was initially reviewed and reported by [facility]. I do not find any discrepancies. This report was signed and finalized on 6/13/2024 6:25 AM by Dr. Jesus Lucero MD.    XR CHEST 1 VIEW    Result Date: 6/12/2024  XR CHEST 1 VW- 6/12/2024 8:35 PM HISTORY: fever   COMPARISON: Chest x-ray dated 5/24/2024 FINDINGS: Upright frontal radiograph of the chest was obtained No lung consolidation. No pleural effusion or pneumothorax. The cardiomediastinal silhouette and pulmonary vascularity are within normal limits. The osseous structures and surrounding soft tissues demonstrate no acute abnormality. Left chest wall dual-chamber pacemaker.    1.  Stable chest exam without acute process. No visualized infiltrate. This report was signed and finalized on 6/12/2024 10:11 PM by Dr Robson Hernandez.      Echo:   Narrative    This result has an attachment that is not available.    Left ventricular systolic function is normal. Left ventricular ejection  fraction appears to be 61 - 65%.    Left ventricular wall thickness is consistent with mild concentric  hypertrophy.    The left atrial cavity is mildly dilated.    Normal size and function of the right ventricle.    No significant valvular pathology.    Compared to prior exam from 4/24/2022, no significant change.    Left Ventricle  Left ventricular systolic function is normal. Left ventricular ejection fraction appears to be 61 - 65%.    Normal left ventricular cavity size noted. Left ventricular wall thickness is consistent with mild concentric hypertrophy. All left ventricular wall segments contract normally. Left ventricular diastolic function is  administered, and the remaining image enhancer was wasted and discarded.  No adverse reaction to image enhancer was noted.  All Measurements     Exam End: 06/12/24 14:53         Objective:   Vitals:   BP (!) 168/70   Pulse 80   Temp 97 °F (36.1 °C) (Temporal)   Resp 18   Ht 1.676 m (5' 6\")   Wt 89.4 kg (197 lb)   SpO2 98%   BMI 31.80 kg/m²     Patient Vitals for the past 24 hrs:   BP Temp Temp src Pulse Resp SpO2 Weight   07/14/24 0559 (!) 168/70 -- -- -- -- -- --   07/14/24 0558 (!) 184/58 97 °F (36.1 °C) Temporal 80 18 98 % --   07/14/24 0200 (!) 147/70 97.1 °F (36.2 °C) Temporal 84 18 98 % --   07/13/24 1830 -- -- -- -- -- 97 % --   07/13/24 1803 (!) 142/69 96.8 °F (36 °C) Temporal 90 18 96 % --   07/13/24 1421 (!) 139/92 (!) 96.6 °F (35.9 °C) Temporal 93 -- 95 % --   07/13/24 0900 138/78 -- -- (!) 103 -- -- --   07/13/24 0853 -- -- -- -- -- -- 89.4 kg (197 lb)   07/13/24 0845 (!) 148/78 -- -- 82 -- -- --         24HR INTAKE/OUTPUT:    Intake/Output Summary (Last 24 hours) at 7/14/2024 0723  Last data filed at 7/14/2024 0218  Gross per 24 hour   Intake 920 ml   Output 650 ml   Net 270 ml         Physical Exam  Vitals and nursing note reviewed.   Constitutional:       General: She is not in acute distress.     Appearance: Normal appearance. She is not ill-appearing, toxic-appearing or diaphoretic.   HENT:      Head: Normocephalic and atraumatic.      Right Ear: External ear normal.      Left Ear: External ear normal.      Nose: Nose normal. No congestion or rhinorrhea.      Mouth/Throat:      Mouth: Mucous membranes are moist.      Pharynx: Oropharynx is clear. No oropharyngeal exudate or posterior oropharyngeal erythema.   Eyes:      General: No scleral icterus.        Right eye: No discharge.         Left eye: No discharge.      Extraocular Movements: Extraocular movements intact.      Conjunctiva/sclera: Conjunctivae normal.      Pupils: Pupils are equal, round, and reactive to light.   Cardiovascular:

## 2024-07-14 NOTE — PLAN OF CARE
Problem: Safety - Adult  Goal: Free from fall injury  7/14/2024 1855 by Slick Virgen, RN  Outcome: Progressing  7/14/2024 1031 by Marycarmen Mott, RN  Outcome: Progressing

## 2024-07-15 LAB
ALBUMIN SERPL-MCNC: 2.6 G/DL (ref 3.5–5.2)
ALP SERPL-CCNC: 84 U/L (ref 35–104)
ALT SERPL-CCNC: 25 U/L (ref 5–33)
ANION GAP SERPL CALCULATED.3IONS-SCNC: 11 MMOL/L (ref 7–19)
AST SERPL-CCNC: 18 U/L (ref 5–32)
BASOPHILS # BLD: 0.1 K/UL (ref 0–0.2)
BASOPHILS NFR BLD: 1.6 % (ref 0–1)
BILIRUB SERPL-MCNC: 0.3 MG/DL (ref 0.2–1.2)
BUN SERPL-MCNC: 13 MG/DL (ref 8–23)
CALCIUM SERPL-MCNC: 8.3 MG/DL (ref 8.8–10.2)
CHLORIDE SERPL-SCNC: 108 MMOL/L (ref 98–111)
CO2 SERPL-SCNC: 21 MMOL/L (ref 22–29)
CREAT SERPL-MCNC: 0.7 MG/DL (ref 0.5–0.9)
EOSINOPHIL # BLD: 0.2 K/UL (ref 0–0.6)
EOSINOPHIL NFR BLD: 4.7 % (ref 0–5)
ERYTHROCYTE [DISTWIDTH] IN BLOOD BY AUTOMATED COUNT: 20.9 % (ref 11.5–14.5)
GLUCOSE BLD-MCNC: 154 MG/DL (ref 70–99)
GLUCOSE BLD-MCNC: 160 MG/DL (ref 70–99)
GLUCOSE BLD-MCNC: 164 MG/DL (ref 70–99)
GLUCOSE BLD-MCNC: 92 MG/DL (ref 70–99)
GLUCOSE SERPL-MCNC: 88 MG/DL (ref 74–109)
HCT VFR BLD AUTO: 27.9 % (ref 37–47)
HEMOCCULT SP1 STL QL: NORMAL
HGB BLD-MCNC: 8.1 G/DL (ref 12–16)
IMM GRANULOCYTES # BLD: 0.2 K/UL
LYMPHOCYTES # BLD: 1.6 K/UL (ref 1.1–4.5)
LYMPHOCYTES NFR BLD: 31.2 % (ref 20–40)
MAGNESIUM SERPL-MCNC: 1.9 MG/DL (ref 1.6–2.4)
MCH RBC QN AUTO: 28.5 PG (ref 27–31)
MCHC RBC AUTO-ENTMCNC: 29 G/DL (ref 33–37)
MCV RBC AUTO: 98.2 FL (ref 81–99)
MONOCYTES # BLD: 0.9 K/UL (ref 0–0.9)
MONOCYTES NFR BLD: 17.4 % (ref 0–10)
NEUTROPHILS # BLD: 2.1 K/UL (ref 1.5–7.5)
NEUTS SEG NFR BLD: 41 % (ref 50–65)
PERFORMED ON: ABNORMAL
PERFORMED ON: NORMAL
PLATELET # BLD AUTO: 201 K/UL (ref 130–400)
PMV BLD AUTO: 12.4 FL (ref 9.4–12.3)
POTASSIUM SERPL-SCNC: 4.5 MMOL/L (ref 3.5–5)
PROT SERPL-MCNC: 5.6 G/DL (ref 6.6–8.7)
RBC # BLD AUTO: 2.84 M/UL (ref 4.2–5.4)
SODIUM SERPL-SCNC: 140 MMOL/L (ref 136–145)
WBC # BLD AUTO: 5.1 K/UL (ref 4.8–10.8)

## 2024-07-15 PROCEDURE — 2580000003 HC RX 258: Performed by: INTERNAL MEDICINE

## 2024-07-15 PROCEDURE — 1180000000 HC REHAB R&B

## 2024-07-15 PROCEDURE — 6360000002 HC RX W HCPCS

## 2024-07-15 PROCEDURE — 82270 OCCULT BLOOD FECES: CPT

## 2024-07-15 PROCEDURE — 6370000000 HC RX 637 (ALT 250 FOR IP): Performed by: PSYCHIATRY & NEUROLOGY

## 2024-07-15 PROCEDURE — 6360000002 HC RX W HCPCS: Performed by: INTERNAL MEDICINE

## 2024-07-15 PROCEDURE — 97116 GAIT TRAINING THERAPY: CPT

## 2024-07-15 PROCEDURE — 6370000000 HC RX 637 (ALT 250 FOR IP): Performed by: INTERNAL MEDICINE

## 2024-07-15 PROCEDURE — 80053 COMPREHEN METABOLIC PANEL: CPT

## 2024-07-15 PROCEDURE — 83735 ASSAY OF MAGNESIUM: CPT

## 2024-07-15 PROCEDURE — 99232 SBSQ HOSP IP/OBS MODERATE 35: CPT | Performed by: PSYCHIATRY & NEUROLOGY

## 2024-07-15 PROCEDURE — 2580000003 HC RX 258

## 2024-07-15 PROCEDURE — 97530 THERAPEUTIC ACTIVITIES: CPT

## 2024-07-15 PROCEDURE — 94760 N-INVAS EAR/PLS OXIMETRY 1: CPT

## 2024-07-15 PROCEDURE — 82962 GLUCOSE BLOOD TEST: CPT

## 2024-07-15 PROCEDURE — 97110 THERAPEUTIC EXERCISES: CPT

## 2024-07-15 PROCEDURE — 85025 COMPLETE CBC W/AUTO DIFF WBC: CPT

## 2024-07-15 PROCEDURE — 36415 COLL VENOUS BLD VENIPUNCTURE: CPT

## 2024-07-15 RX ORDER — ONDANSETRON 4 MG/1
4 TABLET, ORALLY DISINTEGRATING ORAL EVERY 8 HOURS PRN
Status: DISCONTINUED | OUTPATIENT
Start: 2024-07-15 | End: 2024-07-26 | Stop reason: HOSPADM

## 2024-07-15 RX ADMIN — LOSARTAN POTASSIUM 50 MG: 50 TABLET, FILM COATED ORAL at 08:32

## 2024-07-15 RX ADMIN — LEVOTHYROXINE SODIUM 75 MCG: 75 TABLET ORAL at 05:46

## 2024-07-15 RX ADMIN — FERROUS SULFATE TAB 325 MG (65 MG ELEMENTAL FE) 325 MG: 325 (65 FE) TAB at 08:32

## 2024-07-15 RX ADMIN — LEFLUNOMIDE 20 MG: 20 TABLET ORAL at 20:10

## 2024-07-15 RX ADMIN — APIXABAN 5 MG: 5 TABLET, FILM COATED ORAL at 20:09

## 2024-07-15 RX ADMIN — ALLOPURINOL 100 MG: 100 TABLET ORAL at 08:32

## 2024-07-15 RX ADMIN — CEFEPIME 2000 MG: 2 INJECTION, POWDER, FOR SOLUTION INTRAVENOUS at 09:08

## 2024-07-15 RX ADMIN — PANTOPRAZOLE SODIUM 40 MG: 40 TABLET, DELAYED RELEASE ORAL at 08:32

## 2024-07-15 RX ADMIN — METHENAMINE HIPPURATE 1 G: 1000 TABLET ORAL at 08:33

## 2024-07-15 RX ADMIN — MICONAZOLE NITRATE: 2 POWDER TOPICAL at 08:34

## 2024-07-15 RX ADMIN — APIXABAN 5 MG: 5 TABLET, FILM COATED ORAL at 08:32

## 2024-07-15 RX ADMIN — MICONAZOLE NITRATE: 2 POWDER TOPICAL at 20:13

## 2024-07-15 RX ADMIN — COLCHICINE 0.6 MG: 0.6 TABLET, FILM COATED ORAL at 08:32

## 2024-07-15 RX ADMIN — ESTRADIOL 2 G: 0.1 CREAM VAGINAL at 08:30

## 2024-07-15 RX ADMIN — Medication 500 MG: at 08:32

## 2024-07-15 RX ADMIN — CEFEPIME 2000 MG: 2 INJECTION, POWDER, FOR SOLUTION INTRAVENOUS at 20:23

## 2024-07-15 RX ADMIN — ASPIRIN 81 MG: 81 TABLET, COATED ORAL at 08:32

## 2024-07-15 RX ADMIN — FOLIC ACID 1 MG: 1 TABLET ORAL at 08:32

## 2024-07-15 RX ADMIN — Medication: at 08:34

## 2024-07-15 RX ADMIN — ONDANSETRON 4 MG: 4 TABLET, ORALLY DISINTEGRATING ORAL at 09:58

## 2024-07-15 RX ADMIN — METHENAMINE HIPPURATE 1 G: 1000 TABLET ORAL at 17:15

## 2024-07-15 RX ADMIN — DULOXETINE HYDROCHLORIDE 60 MG: 60 CAPSULE, DELAYED RELEASE ORAL at 08:32

## 2024-07-15 RX ADMIN — PREDNISONE 5 MG: 5 TABLET ORAL at 08:32

## 2024-07-15 RX ADMIN — TRAMADOL HYDROCHLORIDE 50 MG: 50 TABLET ORAL at 20:12

## 2024-07-15 RX ADMIN — Medication 1 TABLET: at 08:32

## 2024-07-15 RX ADMIN — VANCOMYCIN HYDROCHLORIDE 2000 MG: 10 INJECTION, POWDER, LYOPHILIZED, FOR SOLUTION INTRAVENOUS at 17:17

## 2024-07-15 ASSESSMENT — PAIN - FUNCTIONAL ASSESSMENT: PAIN_FUNCTIONAL_ASSESSMENT: ACTIVITIES ARE NOT PREVENTED

## 2024-07-15 ASSESSMENT — PAIN DESCRIPTION - LOCATION: LOCATION: FOOT

## 2024-07-15 ASSESSMENT — PAIN DESCRIPTION - PAIN TYPE: TYPE: CHRONIC PAIN

## 2024-07-15 ASSESSMENT — PAIN SCALES - GENERAL
PAINLEVEL_OUTOF10: 0
PAINLEVEL_OUTOF10: 5

## 2024-07-15 ASSESSMENT — PAIN DESCRIPTION - DESCRIPTORS: DESCRIPTORS: ACHING

## 2024-07-15 ASSESSMENT — PAIN DESCRIPTION - ONSET: ONSET: GRADUAL

## 2024-07-15 ASSESSMENT — PAIN DESCRIPTION - ORIENTATION: ORIENTATION: LEFT;RIGHT

## 2024-07-15 NOTE — PROGRESS NOTES
07/15/24 1500   Restrictions/Precautions   Restrictions/Precautions Fall Risk;Isolation   General   Chart Reviewed Yes   Additional Pertinent Hx RA, pacemaker, CAD, MI, DVT, CHF, neurogenic bladder, HTN, charcot's joint, psoriasis vulgaris, osteoporosis, hypothyroidism, cervical spinal stenosis   Diagnosis Acute cholecystitis   Transfers   Sit to Stand Contact guard assistance  (pulling up in kamlesh steady)   Stand to Sit Contact guard assistance   PT Exercises   Exercise Treatment seated BLE exercises 2x20 each w/ manual resistance; weight shifts in kamlesh steady 3x5   Assessment   Assessment Pt tolerated exercises well. Continues to have decreased ROM in R LE. Performed weight shifts in kamlesh steady; needed significant rest breaks following.   History Acute cholecystitis   Safety Devices   Type of Devices Call light within reach;Bed alarm in place;Left in bed   PT Individual Minutes   Time In 1430   Time Out 1515   Minutes 45

## 2024-07-15 NOTE — PROGRESS NOTES
Nutrition Assessment     Type and Reason for Visit: Reassess    Nutrition Recommendations/Plan:   Start Ensure High Protein BID.      Malnutrition Assessment:  Malnutrition Status: At risk for malnutrition (Comment)    Nutrition Assessment:  PO intake has been variable over the last few days. Intake avg 25-75% of meals. Wt loss 4lbs documented from admission. BG remains mildly elevated (89-189mg/dL) on Prednisone. Last documented BM 7/10, PRN Glycolax is available. Will add ONS BID to supplement meal intake and promote wound healing. Will continue to follow.    Estimated Daily Nutrient Needs:  Energy (kcal):  6066-2633 (15-18kcal/kg) Weight Used for Energy Requirements: Current     Protein (g):   (1.2-2.0g/kg) Weight Used for Protein Requirements: Ideal        Fluid (ml/day):  1802-6426 Method Used for Fluid Requirements: 1 ml/kcal    Nutrition Related Findings:   +2 RLE, +1 LLE edema Wound Type: Open Wounds (buttocks)    Current Nutrition Therapies:    ADULT DIET; Regular; No scrambled eggs  ADULT ORAL NUTRITION SUPPLEMENT; Breakfast, Dinner; Low Calorie/High Protein Oral Supplement    Anthropometric Measures:  Height: 167.6 cm (5' 6\")  Current Body Wt: 89.4 kg (197 lb)   BMI: 31.8    Nutrition Diagnosis:   Increased nutrient needs related to increase demand for energy/nutrients as evidenced by wounds    Nutrition Interventions:   Food and/or Nutrient Delivery: Continue Current Diet, Start Oral Nutrition Supplement  Nutrition Education/Counseling: No recommendation at this time  Coordination of Nutrition Care: Continue to monitor while inpatient       Goals:  Previous Goal Met: Progressing toward Goal(s)  Goals: PO intake 50% or greater       Nutrition Monitoring and Evaluation:   Behavioral-Environmental Outcomes: None Identified  Food/Nutrient Intake Outcomes: Food and Nutrient Intake, Supplement Intake  Physical Signs/Symptoms Outcomes: Biochemical Data, Nutrition Focused Physical Findings, Skin,

## 2024-07-15 NOTE — PROGRESS NOTES
Patient:   Kelsey Morejon  MR#:    [id number]   Room:    [facility]/815-02   YOB: 1955  Date of Progress Note: 7/15/2024  Time of Note                           7:23 AM  Consulting Physician:   Guy Stubbs M.D.  Attending Physician:  Guy Stubbs MD     Chief complaint Status post cholecystectomy     S:This 68 y.o. 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 Patient admitted on 6/13/2024 with AMS. She was found to have UTI  and left ureteral calculus with obstruction and acute hypoxic respiratory failure. She has been treated with Cefepime. Lithotripsy performed on 6/21/2024. GI was consulted on 6/21/2024 for abdominal pain. GB US showed cholelithiasis with small stones and sludge. HIDA scan done showed Non visualization of the gallbladder and could not rule out cystic duct obstruction.  On 07/03/24 she underwent a CHOLECYSTECTOMY LAPAROSCOPIC WITH INTRAOPERATIVE CHOLANGIOGRAM by   Lexa Waddell MD. She tolerated procedure well.   She does have a wound on her R buttock from cyst removal by Dr. Waddell. The wound has opened up and has been dressed w/ wet-to-dry dressings by Home Health. Wound measures 1.2cm x 2 cm x 2.5. Undermining circumferentially reaching 5cm at 4 o'clock. Subcutaneous tissue, wound bed. Small amount of serosanguineous drainage. Aixa wound area is slight erythema but blanchable.  She was complaining about some difficulty swallowing. Speech evaluated her. She is now tolerating a regular diet, thin liquids with meds whole in applesauce.   She is now medically stable and is participating with therapy. She is ready to begin rehab with goal of returning home after rehab dc with support from her caregivers and family.  Previously syncope after large bowel movement.  Was hypotensive.  Noted to have urinary tract infection.  Fluid bolus was given and medications were adjusted.   any pain   Transfers   Sit to Stand Contact guard assistance  (pulling up to SS)   Stand to Sit Contact guard assistance   Bed to Chair Dependent/Total  (transfer bed to recliner via SS)   PT Exercises   A/AROM Exercises R eccentric knee ext x 10; R LAQ x 10 (very small range), x 2 sets of 10 R hip flex, B ADD sets with ball   Resistive Exercises x 2 sets of 10 L LAQ, L hip flex and B HS curls and B hip abd with man resist   Static Standing Balance Exercises standing in SS x  30\" stby@   Assessment   Assessment Pt sitting tolerance improved and she has been in chair most of day. Pt accepting resist B LE though limited on R and range not complete. Pt gives good effort with all and voices no complaints during session.   Activity Tolerance Comment Recommend progression to RW next session to work towards transfers for home   Safety Devices   Type of Devices Call light within reach;Left in chair   PT Individual Minutes   Time In 1345   Time Out 1420   Minutes 35            Electronically signed by Brissa Wood PTA on 7/13/2024 at 3:01 PM                          RECORD REVIEW: Previous medical records, medications were reviewed at today's visit    IMPRESSION:   1.  Status post cholecystectomy-monitor  2.  Gout-allopurinol/colchicine  3.  Coronary artery disease-aspirin/Imdur  4.  Hypertension-on meds monitor  5.  Anemia-on iron  6.  Rheumatoid arthritis-Arava/prednisone  7.  Urinary incontinence-Hiprex  8.  Wound left buttock-wound care  9.  GI-PPI/bowel regimen  10.  Pain control-Percocet as needed/tramadol as needed/Lidoderm patch  11.  Hypothyroidism-Synthroid  12.  Myalgia due to statin  13.  PT/OT     Appreciate hospitalist help  UTI-complete antibiotic    Hypotension-drop of systolic blood pressure from 10 7-69-Lasix, spironolactone, and Coreg held -fluid bolus given -improved      Continue present care      Expected duration and frequency therapy: 180 minutes per day, 5 days per week    CALL WITH ANY

## 2024-07-15 NOTE — PATIENT CARE CONFERENCE
[facility] ACUTE INPATIENT REHABILITATION  TEAM CONFERENCE NOTE    Date: 2024  Patient Name: Kelsey Morejon        MRN: [id number]    : 1955  (69 y.o.)  Gender: female      Diagnosis: Acute cholecystitis      PHYSICAL THERAPY  GROSS ASSESSMENT       BED MOBILITY  Bed mobility  Rolling to Left: Modified independent  Rolling to Right: Minimal assistance  Supine to Sit: Minimal assistance  Sit to Supine: Maximum assistance (w/ trunk and LE)  Scooting: Moderate assistance       TRANSFERS  Transfers  Sit to Stand: Contact guard assistance (pulling up in kamlesh steady)  Stand to Sit: Contact guard assistance  Bed to Chair: Dependent/Total (transfer from wheelchair to recliner w/ kamlesh steady)  Stand Pivot Transfers: Maximum Assistance, 2 Person Assistance (to R; w/ rw)  Comment: first STS transfer min assist; last STS transfer max assist x2 due to fatigue  WHEELCHAIR PROPULSION  Propulsion 1  Propulsion: Manual  Level: Level Tile  Method: RUBINA FITZPATRICK  Level of Assistance: Moderate assistance  Description/ Details: veering to Left  Distance: 50'  AMBULATION  Ambulation  Surface: Level tile  Device: Parallel Bars  Assistance: Minimal assistance  Quality of Gait: FFP; significantly decreased LLE swing phase; antalgic gait; bilat knee flex  Gait Deviations: Slow Vania, Decreased step length, Decreased step height  Distance: 2 steps x3  Comments: needed rest breaks following 2 steps. Unable to unweight L LE to advance for swing phase.  STAIRS     GOALS:  Short Term Goals  Time Frame for Short Term Goals: 1 Week  Short Term Goal 1: Roll L/R with use of bedrails w/ supervision assist  Short Term Goal 2: propel wheelchair 30' with min assist  Short Term Goal 3: perform stand pivot transfer using rw w/ mod assist  Short Term Goal 4: supine to sit transfer with min assist  Short Term Goal 5: scoot EOB with min assist    Long Term Goals  Time Frame for Long Term Goals : 2 weeks  Long Term Goal 1: Perform Sit to stand  Conference:  Medical Director (Team Conference Leader): Guy Stubbs MD  : JOHN WilkinsW   Occupational Therapist: Deanna Helms OTR/L  Physical Therapist: Du Herr PT,DPT  Speech Therapist: N/A  Nurse:  Marycarmen Mtot RN  Nurse Manager:  Maryan Coffman RN, BSN  Dietitian:  Carrie Montoya, MS, LD, RD      As the Medical Director:  I was physically present and lead the team conference discussion, and I approve the established interdisciplinary plan of care   as documented within the medical record of Kelsey Morejon.

## 2024-07-15 NOTE — PROGRESS NOTES
07/15/24 1025   Encounter Summary   Encounter Overview/Reason Spiritual/Emotional Needs   Service Provided For Patient   Referral/Consult From Nurse   Support System Home care staff;Latter-day/letha community   Complexity of Encounter Low   Begin Time 1000   End Time  1020   Total Time Calculated 20 min   Crisis   Type Emotional distress   Spiritual/Emotional needs   Type Spiritual Support   Rituals, Rites and Sacraments   Type Blessings   Grief, Loss, and Adjustments   Type Adjustment to illness   Advance Care Planning   Type ACP conversation   Assessment/Intervention/Outcome   Assessment Hopeful;Peaceful  (Pt has her own group of friends and they meet in their Confucianism and have fellowship and support each other.)   Intervention Discussed belief system/Catholic practices/letha;Discussed relationship with God   Outcome Peace;Expressed Gratitude;Expressed feelings, needs, and concerns  (Patient showed positiveness and boldness to face the world and continue her life journey.)   Plan and Referrals   Plan/Referrals No future visits requested   Does the patient have a [facility] PCP? Yes    to follow up after discharge? No     Electronically signed by Chaplain Zeinab.M.Div. M.Th. on 7/15/2024 at 10:36 AM

## 2024-07-15 NOTE — ACP (ADVANCE CARE PLANNING)
Advance Care Planning     Advance Care Planning Inpatient Note  [facility] Department    Today's Date: 7/15/2024  Unit: [facility] 8 REHAB UNIT    Received request from IDT Member.  Upon review of chart and communication with care team, patient's decision making abilities are not in question.. Patient and care giver was with her.  was/were present in the room during visit.    Goals of ACP Conversation:  Facilitate a discussion related to patient's goals of care as they align with the patient's values and beliefs.    Health Care Decision Makers:       Primary Decision Maker: DarleenJoe - Child - [phone]    Secondary Decision Maker: Janis Beal - Child - [phone]  Summary:  Documented Next of Kin, per patient report  The patient wanted to keep the same child as her decision maker.    Advance Care Planning Documents (Patient Wishes):  None     Assessment:  The patient said she has a health worker, she would come every day and provided care and assistance, and said emtionally she was doing ok in the givern situatioo  Interventions:  Encouraged ongoing ACP conversation with future decision makers and loved ones    Care Preferences Communicated:   No    Outcomes/Plan:  ACP Discussion: The patient wanted to have this discussion in an another time.    Electronically signed by Chaplain Zeinab. Baljit MCindyon 7/15/2024 at 10:38 AM            BRIEF OPERATIVE NOTE    Date of Procedure: 2/13/2020   Preoperative Diagnosis: BILATERAL INGUINAL HERNIA, UMBILICAL HERNIA  Postoperative Diagnosis: BILATERAL DIRECT INGUINAL HERNIA, UMBILICAL HERNIA    Procedure(s):  ROBOTIC ASSISTED BILATERAL INGUINAL HERNIA WITH MESH  UMBILICAL HERNIA REPAIR  Surgeon(s) and Role:     * Rafat Kam MD - Primary         Surgical Assistant: Nanci Bravo    Surgical Staff:  Circ-1: Regan Peralta RN  Circ-Relief: Bobby Graham RN  Scrub Tech-1: Renee Saldaña  Surg Asst-1: Thalia Luis I  Event Time In Time Out   Incision Start 0901    Incision Close 1040      Anesthesia: General   Estimated Blood Loss: Min  Specimens: * No specimens in log *   Findings: 2 cm left direct, 1.5cm right direct, 8mm umbilical hernia with incarcerated umbilical hernia fat   Complications: none  Implants:   Implant Name Type Inv.  Item Serial No.  Lot No. LRB No. Used Action   MESH KENJI LG 4X6IN R LF -- 3DMAX - SN/A  MESH KENJI LG 4X6IN R LF -- 3DMAX N/A [employer] [id number] Right 1 Implanted   MESH KENJI LG LT 4X6IN -- 3DMAX - SN/A  MESH KENJI LG LT 4X6IN -- 3DMAX N/A [employer] [id number] Left 1 Implanted

## 2024-07-15 NOTE — PROGRESS NOTES
07/15/24 0900   Assessment   Assessment Pt refused therapy due to recurring emesis following taking medication. Nursing present.   Safety Devices   Type of Devices Left in bed;Call light within reach   PT Individual Minutes   Time In 0900   Time Out 0900   Minutes 0

## 2024-07-15 NOTE — PROGRESS NOTES
Facility/Department: [facility] 8 REHAB UNIT  Occupational Therapy     Name: Kelsey Morejon  : 1955  MRN: [id number]  Date of Service: 7/15/2024    Discharge Recommendations:  Continue to assess pending progress    Patient Diagnosis(es): There were no encounter diagnoses.  Past Medical History:  has a past medical history of CAD (coronary artery disease), Cellulitis, History of blood transfusion, History of DVT (deep vein thrombosis), History of neutropenia, History of pulmonary fibrosis, Hx of blood clots, Hypercholesterolemia, Hypertension, Intrinsic asthma, Pacemaker, Post op infection, Rheumatoid arteritis (HCC), Sinus node dysfunction (HCC), Staph aureus infection, Status post placement of implantable loop recorder, Syncope, and Thyroid disease.  Past Surgical History:  has a past surgical history that includes Diagnostic Cardiac Cath Lab Procedure; LEEP; Coronary angioplasty with stent (); Pacemaker insertion (2016); back surgery (2018); pr arthrp kne condyle&platu medial&lat compartments (Right, 2018); pr i&d deep absc bursa/hematoma thigh/knee region (Right, 10/23/2018); Cardiac catheterization (14  MDL); Cardiac catheterization (1/26/15  MDL); pacemaker placement; joint replacement; Revision total knee arthroplasty (Right, 2018); and Cholecystectomy (2024).    Treatment Diagnosis: Acute cholecystitis, s/p cholecystectomy      Assessment   Performance deficits / Impairments: Decreased functional mobility ;Decreased ADL status;Decreased strength;Decreased endurance;Decreased balance;Decreased high-level IADLs  Treatment Diagnosis: Acute cholecystitis, s/p cholecystectomy  Prognosis: Good  Activity Tolerance  Activity Tolerance: Patient Tolerated treatment well       Plan   Occupational Therapy Plan  Specific Instructions for Next Treatment: AE for footwear  Current Treatment Recommendations: Strengthening, Balance training, Functional mobility training, Patient/Caregiver

## 2024-07-15 NOTE — PROGRESS NOTES
07/15/24 1030   Restrictions/Precautions   Restrictions/Precautions Fall Risk;Isolation   General   Chart Reviewed Yes   Additional Pertinent Hx RA, pacemaker, CAD, MI, DVT, CHF, neurogenic bladder, HTN, charcot's joint, psoriasis vulgaris, osteoporosis, hypothyroidism, cervical spinal stenosis   Diagnosis Acute cholecystitis   Transfers   Sit to Stand Contact guard assistance  (pulling up to kamlesh steady)   Stand to Sit Contact guard assistance   Bed to Chair Dependent/Total  (transfer from wheelchair to recliner w/ kamlesh steady)   Ambulation   Surface Level tile   Device Parallel Bars   Assistance Minimal assistance   Quality of Gait FFP; significantly decreased LLE swing phase; antalgic gait; bilat knee flex   Gait Deviations Slow Vania;Decreased step length;Decreased step height   Distance 2 steps x3   Comments needed rest breaks following 2 steps. Unable to unweight L LE to advance for swing phase.   Assessment   Assessment CO-TX w/ OT. Attempted ambulation in // bars. Able to take 2 steps before needed rest break. Unable to unweight LLE for swing. Cued to shift weight to RLE for LLE advancement but unable. Pt ambulates with bilat knee flex and FFP. Cues to stand up straight. Continues to show improvement with STS transfer.   History Acute cholecystitis   Safety Devices   Type of Devices Call light within reach;Left in chair   PT Co-Treatment Minutes   Time In 1030   Time Out 1130   Minutes 60

## 2024-07-15 NOTE — PROGRESS NOTES
[facility]   Pharmacy Pharmacokinetic Monitoring Service - Vancomycin     Kelsey Morejon is a 68 y.o. female starting on vancomycin therapy for Skin and soft issue Infection. Pharmacy consulted by Dr. Richter for monitoring and adjustment.    Target Concentration: Goal AUC/TEMI 400-600 mg*hr/L    Additional Antimicrobials: Cefepime    Pertinent Laboratory Values:   Wt Readings from Last 1 Encounters:   07/13/24 89.4 kg (197 lb)     Temp Readings from Last 1 Encounters:   07/15/24 97.7 °F (36.5 °C) (Temporal)     Estimated Creatinine Clearance: 84 mL/min (based on SCr of 0.7 mg/dL).  Recent Labs     07/14/24  0258 07/15/24  0321   CREATININE 0.8 0.7   BUN 15 13   WBC 5.7 5.1     Procalcitonin: not ordered    Pertinent Cultures:  Culture Date Source Results   07/10/24 urine Enterobacter Cloacae complex  [place]y albicans   07/13/24 Wound buttock MRSA   MRSA Nasal Swab: N/A. Non-respiratory infection.    Plan:  Dosing recommendations based on Bayesian software  Start vancomycin with loading dose of 2000mg X1 then 1000mg IV every 12 hours  Anticipated AUC of 501 and trough concentration of 14.9 at steady state  Renal labs as indicated   Vancomycin concentration ordered for 7/17 @ 0430   Pharmacy will continue to monitor patient and adjust therapy as indicated    Thank you for the consult,  Lexus Koroma [facility]  7/15/2024 4:08 PM

## 2024-07-16 LAB
ALBUMIN SERPL-MCNC: 2.5 G/DL (ref 3.5–5.2)
ALP SERPL-CCNC: 85 U/L (ref 35–104)
ALT SERPL-CCNC: 24 U/L (ref 5–33)
ANION GAP SERPL CALCULATED.3IONS-SCNC: 11 MMOL/L (ref 7–19)
AST SERPL-CCNC: 26 U/L (ref 5–32)
BACTERIA SPEC ANAEROBE+AEROBE CULT: ABNORMAL
BASOPHILS # BLD: 0.1 K/UL (ref 0–0.2)
BASOPHILS NFR BLD: 1.2 % (ref 0–1)
BILIRUB SERPL-MCNC: 0.4 MG/DL (ref 0.2–1.2)
BUN SERPL-MCNC: 11 MG/DL (ref 8–23)
CALCIUM SERPL-MCNC: 8.3 MG/DL (ref 8.8–10.2)
CHLORIDE SERPL-SCNC: 110 MMOL/L (ref 98–111)
CO2 SERPL-SCNC: 18 MMOL/L (ref 22–29)
CREAT SERPL-MCNC: 0.6 MG/DL (ref 0.5–0.9)
EOSINOPHIL # BLD: 0.3 K/UL (ref 0–0.6)
EOSINOPHIL NFR BLD: 5.4 % (ref 0–5)
ERYTHROCYTE [DISTWIDTH] IN BLOOD BY AUTOMATED COUNT: 21 % (ref 11.5–14.5)
GLUCOSE BLD-MCNC: 139 MG/DL (ref 70–99)
GLUCOSE BLD-MCNC: 146 MG/DL (ref 70–99)
GLUCOSE BLD-MCNC: 163 MG/DL (ref 70–99)
GLUCOSE BLD-MCNC: 91 MG/DL (ref 70–99)
GLUCOSE SERPL-MCNC: 81 MG/DL (ref 74–109)
GRAM STN SPEC: ABNORMAL
HCT VFR BLD AUTO: 27.9 % (ref 37–47)
HGB BLD-MCNC: 8.1 G/DL (ref 12–16)
IMM GRANULOCYTES # BLD: 0.2 K/UL
LYMPHOCYTES # BLD: 1.5 K/UL (ref 1.1–4.5)
LYMPHOCYTES NFR BLD: 26.4 % (ref 20–40)
MAGNESIUM SERPL-MCNC: 1.7 MG/DL (ref 1.6–2.4)
MCH RBC QN AUTO: 29 PG (ref 27–31)
MCHC RBC AUTO-ENTMCNC: 29 G/DL (ref 33–37)
MCV RBC AUTO: 100 FL (ref 81–99)
MONOCYTES # BLD: 1 K/UL (ref 0–0.9)
MONOCYTES NFR BLD: 17.7 % (ref 0–10)
NEUTROPHILS # BLD: 2.6 K/UL (ref 1.5–7.5)
NEUTS SEG NFR BLD: 45.6 % (ref 50–65)
ORGANISM: ABNORMAL
PERFORMED ON: ABNORMAL
PERFORMED ON: NORMAL
PLATELET # BLD AUTO: 194 K/UL (ref 130–400)
PMV BLD AUTO: 12.3 FL (ref 9.4–12.3)
POTASSIUM SERPL-SCNC: 4.8 MMOL/L (ref 3.5–5)
PROT SERPL-MCNC: 5.3 G/DL (ref 6.6–8.7)
RBC # BLD AUTO: 2.79 M/UL (ref 4.2–5.4)
SODIUM SERPL-SCNC: 139 MMOL/L (ref 136–145)
WBC # BLD AUTO: 5.7 K/UL (ref 4.8–10.8)

## 2024-07-16 PROCEDURE — 83735 ASSAY OF MAGNESIUM: CPT

## 2024-07-16 PROCEDURE — 6370000000 HC RX 637 (ALT 250 FOR IP): Performed by: PSYCHIATRY & NEUROLOGY

## 2024-07-16 PROCEDURE — 05HB33Z INSERTION OF INFUSION DEVICE INTO RIGHT BASILIC VEIN, PERCUTANEOUS APPROACH: ICD-10-PCS | Performed by: PSYCHIATRY & NEUROLOGY

## 2024-07-16 PROCEDURE — 6360000002 HC RX W HCPCS: Performed by: INTERNAL MEDICINE

## 2024-07-16 PROCEDURE — 82962 GLUCOSE BLOOD TEST: CPT

## 2024-07-16 PROCEDURE — 2580000003 HC RX 258: Performed by: INTERNAL MEDICINE

## 2024-07-16 PROCEDURE — 97110 THERAPEUTIC EXERCISES: CPT

## 2024-07-16 PROCEDURE — 2580000003 HC RX 258

## 2024-07-16 PROCEDURE — 97530 THERAPEUTIC ACTIVITIES: CPT

## 2024-07-16 PROCEDURE — 36415 COLL VENOUS BLD VENIPUNCTURE: CPT

## 2024-07-16 PROCEDURE — 85025 COMPLETE CBC W/AUTO DIFF WBC: CPT

## 2024-07-16 PROCEDURE — 36410 VNPNXR 3YR/> PHY/QHP DX/THER: CPT

## 2024-07-16 PROCEDURE — 80053 COMPREHEN METABOLIC PANEL: CPT

## 2024-07-16 PROCEDURE — C1751 CATH, INF, PER/CENT/MIDLINE: HCPCS

## 2024-07-16 PROCEDURE — 94760 N-INVAS EAR/PLS OXIMETRY 1: CPT

## 2024-07-16 PROCEDURE — 76937 US GUIDE VASCULAR ACCESS: CPT

## 2024-07-16 PROCEDURE — 6360000002 HC RX W HCPCS

## 2024-07-16 PROCEDURE — 99232 SBSQ HOSP IP/OBS MODERATE 35: CPT | Performed by: PSYCHIATRY & NEUROLOGY

## 2024-07-16 PROCEDURE — 1180000000 HC REHAB R&B

## 2024-07-16 PROCEDURE — 97535 SELF CARE MNGMENT TRAINING: CPT

## 2024-07-16 RX ADMIN — LOSARTAN POTASSIUM 50 MG: 50 TABLET, FILM COATED ORAL at 07:52

## 2024-07-16 RX ADMIN — MICONAZOLE NITRATE: 2 POWDER TOPICAL at 08:11

## 2024-07-16 RX ADMIN — TRAMADOL HYDROCHLORIDE 50 MG: 50 TABLET ORAL at 20:15

## 2024-07-16 RX ADMIN — ASPIRIN 81 MG: 81 TABLET, COATED ORAL at 07:52

## 2024-07-16 RX ADMIN — CEFEPIME 2000 MG: 2 INJECTION, POWDER, FOR SOLUTION INTRAVENOUS at 20:21

## 2024-07-16 RX ADMIN — LEFLUNOMIDE 20 MG: 20 TABLET ORAL at 20:22

## 2024-07-16 RX ADMIN — METHENAMINE HIPPURATE 1 G: 1000 TABLET ORAL at 07:50

## 2024-07-16 RX ADMIN — LEVOTHYROXINE SODIUM 75 MCG: 75 TABLET ORAL at 05:45

## 2024-07-16 RX ADMIN — APIXABAN 5 MG: 5 TABLET, FILM COATED ORAL at 07:52

## 2024-07-16 RX ADMIN — Medication 1000 MG: at 04:37

## 2024-07-16 RX ADMIN — ALLOPURINOL 100 MG: 100 TABLET ORAL at 07:52

## 2024-07-16 RX ADMIN — METHENAMINE HIPPURATE 1 G: 1000 TABLET ORAL at 17:25

## 2024-07-16 RX ADMIN — Medication 1000 MG: at 17:24

## 2024-07-16 RX ADMIN — PANTOPRAZOLE SODIUM 40 MG: 40 TABLET, DELAYED RELEASE ORAL at 07:52

## 2024-07-16 RX ADMIN — FOLIC ACID 1 MG: 1 TABLET ORAL at 07:52

## 2024-07-16 RX ADMIN — RUGBY ZINC OXIDE 20%: 20 OINTMENT TOPICAL at 08:11

## 2024-07-16 RX ADMIN — Medication 500 MG: at 07:52

## 2024-07-16 RX ADMIN — MICONAZOLE NITRATE: 2 POWDER TOPICAL at 20:23

## 2024-07-16 RX ADMIN — Medication 1 TABLET: at 07:51

## 2024-07-16 RX ADMIN — CEFEPIME 2000 MG: 2 INJECTION, POWDER, FOR SOLUTION INTRAVENOUS at 08:02

## 2024-07-16 RX ADMIN — PREDNISONE 5 MG: 5 TABLET ORAL at 07:52

## 2024-07-16 RX ADMIN — Medication: at 08:11

## 2024-07-16 RX ADMIN — DULOXETINE HYDROCHLORIDE 60 MG: 60 CAPSULE, DELAYED RELEASE ORAL at 07:51

## 2024-07-16 RX ADMIN — FERROUS SULFATE TAB 325 MG (65 MG ELEMENTAL FE) 325 MG: 325 (65 FE) TAB at 07:52

## 2024-07-16 RX ADMIN — APIXABAN 5 MG: 5 TABLET, FILM COATED ORAL at 20:15

## 2024-07-16 RX ADMIN — COLCHICINE 0.6 MG: 0.6 TABLET, FILM COATED ORAL at 07:51

## 2024-07-16 ASSESSMENT — PAIN SCALES - GENERAL: PAINLEVEL_OUTOF10: 0

## 2024-07-16 ASSESSMENT — SOCIAL DETERMINANTS OF HEALTH (SDOH)
IN A TYPICAL WEEK, HOW MANY TIMES DO YOU TALK ON THE PHONE WITH FAMILY, FRIENDS, OR NEIGHBORS?: MORE THAN THREE TIMES A WEEK
HOW OFTEN DO YOU ATTEND CHURCH OR RELIGIOUS SERVICES?: MORE THAN 4 TIMES PER YEAR
HOW OFTEN DO YOU GET TOGETHER WITH FRIENDS OR RELATIVES?: MORE THAN THREE TIMES A WEEK
DO YOU BELONG TO ANY CLUBS OR ORGANIZATIONS SUCH AS CHURCH GROUPS UNIONS, FRATERNAL OR ATHLETIC GROUPS, OR SCHOOL GROUPS?: YES
HOW OFTEN DO YOU ATTENT MEETINGS OF THE CLUB OR ORGANIZATION YOU BELONG TO?: MORE THAN 4 TIMES PER YEAR

## 2024-07-16 NOTE — PROGRESS NOTES
Patient:   Kelsey Morejon  MR#:    [id number]   Room:    [facility]/815-02   YOB: 1955  Date of Progress Note: 7/15/2024  Time of Note                           7:23 AM  Consulting Physician:   Guy Stubbs M.D.  Attending Physician:  Guy Stubbs MD     Chief complaint Status post cholecystectomy     S:This 68 y.o. 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 Patient admitted on 6/13/2024 with AMS. She was found to have UTI  and left ureteral calculus with obstruction and acute hypoxic respiratory failure. She has been treated with Cefepime. Lithotripsy performed on 6/21/2024. GI was consulted on 6/21/2024 for abdominal pain. GB US showed cholelithiasis with small stones and sludge. HIDA scan done showed Non visualization of the gallbladder and could not rule out cystic duct obstruction.  On 07/03/24 she underwent a CHOLECYSTECTOMY LAPAROSCOPIC WITH INTRAOPERATIVE CHOLANGIOGRAM by   Lexa Waddell MD. She tolerated procedure well.   She does have a wound on her R buttock from cyst removal by Dr. Waddell. The wound has opened up and has been dressed w/ wet-to-dry dressings by Home Health. Wound measures 1.2cm x 2 cm x 2.5. Undermining circumferentially reaching 5cm at 4 o'clock. Subcutaneous tissue, wound bed. Small amount of serosanguineous drainage. Aixa wound area is slight erythema but blanchable.  She was complaining about some difficulty swallowing. Speech evaluated her. She is now tolerating a regular diet, thin liquids with meds whole in applesauce.   She is now medically stable and is participating with therapy. She is ready to begin rehab with goal of returning home after rehab dc with support from her caregivers and family.  Previously syncope after large bowel movement.  Was hypotensive.  Noted to have urinary tract infection.  Fluid bolus was given and medications were adjusted.     Transfers   Sit to Stand Contact guard assistance  (pulling up to SS)   Stand to Sit Contact guard assistance   Bed to Chair Dependent/Total  (transfer bed to recliner via SS)   PT Exercises   A/AROM Exercises R eccentric knee ext x 10; R LAQ x 10 (very small range), x 2 sets of 10 R hip flex, B ADD sets with ball   Resistive Exercises x 2 sets of 10 L LAQ, L hip flex and B HS curls and B hip abd with man resist   Static Standing Balance Exercises standing in SS x  30\" stby@   Assessment   Assessment Pt sitting tolerance improved and she has been in chair most of day. Pt accepting resist B LE though limited on R and range not complete. Pt gives good effort with all and voices no complaints during session.   Activity Tolerance Comment Recommend progression to RW next session to work towards transfers for home   Safety Devices   Type of Devices Call light within reach;Left in chair   PT Individual Minutes   Time In 1345   Time Out 1420   Minutes 35            Electronically signed by Brissa Wood PTA on 7/13/2024 at 3:01 PM                          RECORD REVIEW: Previous medical records, medications were reviewed at today's visit    IMPRESSION:   1.  Status post cholecystectomy-monitor  2.  Gout-allopurinol/colchicine  3.  Coronary artery disease-aspirin/Imdur  4.  Hypertension-on meds monitor  5.  Anemia-on iron  6.  Rheumatoid arthritis-Arava/prednisone  7.  Urinary incontinence-Hiprex  8.  Wound left buttock-wound care  9.  GI-PPI/bowel regimen  10.  Pain control-Percocet as needed/tramadol as needed/Lidoderm patch  11.  Hypothyroidism-Synthroid  12.  Myalgia due to statin  13.  PT/OT     Appreciate hospitalist help  UTI-complete antibiotic    Hypotension-drop of systolic blood pressure -Lasix, spironolactone, and Coreg held -fluid bolus given -improved      Team conference with PT/OT/speech/nursing/Care coordinator to review in depth patients care and discharge planning. At least 35 minutes spent

## 2024-07-16 NOTE — PROGRESS NOTES
Physical Therapy  Name: Kelsey Morejon  MRN:  [id number]  Date of service:  7/16/2024 07/16/24 1300   General Comment   Comments in recliner post lunch   Subjective   Subjective Pt ready for 2nd PT. States legs tired and feels that am is best for standing practice   PT Exercises   A/AROM Exercises QS x 20, ADD sets x 20   Resistive Exercises man resist 2 sets of 10: laq, ankle PF/DF, hip flex, hip ext, hip abd, hs curls   Static Standing Balance Exercises brief standing only in SS; endurance dec this afternoon and suggests she does better in am   Safety Devices   Type of Devices Call light within reach;Left in chair   PT Individual Minutes   Time In 1300   Time Out 1355   Minutes 55         Electronically signed by Brissa Wood PTA on 7/16/2024 at 2:43 PM

## 2024-07-16 NOTE — PROGRESS NOTES
Physical Therapy  Name: Kelsey Morejon  MRN:  [id number]  Date of service:  7/16/2024 07/16/24 1300   General Comment   Comments in recliner post lunch   Subjective   Subjective Pt ready for 2nd PT. States legs tired and feels that am is best for standing practice   Transfers   Sit to Stand Contact guard assistance  (pulling up to SS from recliner and power chair)   Stand to Sit Contact guard assistance   Bed to Chair Dependent/Total  (recliner>power chair>recliner via SS)   PT Exercises   A/AROM Exercises QS x 20, ADD sets x 20   Resistive Exercises man resist 2 sets of 10: laq, ankle PF/DF, hip flex, hip ext, hip abd, hs curls   Static Standing Balance Exercises brief standing only in SS; endurance dec this afternoon and suggests she does better in am   Safety Devices   Type of Devices Call light within reach;Left in chair   PT Individual Minutes   Time In 1300   Time Out 1355   Minutes 55         Electronically signed by Brissa Wood PTA on 7/16/2024 at 2:44 PM

## 2024-07-16 NOTE — PROCEDURES
[facility] Vascular Access Team:  PowerGlide Midline/Extended Dwell IV Catheter  Insertion Procedure Note      Patient: Kelsey Morejon  YOB: 1955   MRN: [id number]  Room: [address] [place] [address]     Attending Physician - Guy Stubbs MD  Ordering Physician - Guy Stubbs MD    Diagnosis:   S/P cholecystectomy [Z90.49]       Procedure(s):   Insertion of a 20 gauge 10 cm Single Lumen PowerGlide Catheter    Indication(s):  Poor venous access, IV antibiotics    Date of Procedure: 7/16/2024     Performed by: Buck Spnecer RN    Complications: None      Findings:   1. Successful insertion of PowerGlide Catheter.  2. PowerGlide is ready to be used. Please change tubing prior to using the new line. Make sure to label, date and use alcohol caps on new tubing and alcohol caps on unused ports.   3. Patient may be changed to a unit draw for lab work.        Detailed Description of Procedure:   The Right Basilic vein was visualized using the ultrasound and deemed a suitable vessel. The area was prepped with Chlorhexidine and draped in sterile fashion using partial barrier draping. The vein was accessed using US guidance. The 20 gauge 10 cm Single Lumen PowerGlide catheter was advanced into the vein using ANTT. Approximately 0 cm exposed. All lumens had brisk blood return and was flushed with 10 cc of NS. The catheter was secured using a securement device. A 3M CHG Tegaderm was placed over the securement device and insertion site. Dressing was dated and initialed with external measurement marked. Patient did tolerate procedure well.        Electronically signed by Buck Spencer RN on 7/16/2024 at 4:04 PM

## 2024-07-16 NOTE — PROGRESS NOTES
Facility/Department: [facility] 8 REHAB UNIT  Occupational Therapy     Name: Kelsey Morejon  : 1955  MRN: [id number]  Date of Service: 2024    Discharge Recommendations:  Continue to assess pending progress    Patient Diagnosis(es): There were no encounter diagnoses.  Past Medical History:  has a past medical history of CAD (coronary artery disease), Cellulitis, History of blood transfusion, History of DVT (deep vein thrombosis), History of neutropenia, History of pulmonary fibrosis, Hx of blood clots, Hypercholesterolemia, Hypertension, Intrinsic asthma, Pacemaker, Post op infection, Rheumatoid arteritis (HCC), Sinus node dysfunction (HCC), Staph aureus infection, Status post placement of implantable loop recorder, Syncope, and Thyroid disease.  Past Surgical History:  has a past surgical history that includes Diagnostic Cardiac Cath Lab Procedure; LEEP; Coronary angioplasty with stent (); Pacemaker insertion (2016); back surgery (2018); pr arthrp kne condyle&platu medial&lat compartments (Right, 2018); pr i&d deep absc bursa/hematoma thigh/knee region (Right, 10/23/2018); Cardiac catheterization (14  MDL); Cardiac catheterization (1/26/15  MDL); pacemaker placement; joint replacement; Revision total knee arthroplasty (Right, 2018); and Cholecystectomy (2024).    Treatment Diagnosis: Acute cholecystitis, s/p cholecystectomy      Assessment   Performance deficits / Impairments: Decreased functional mobility ;Decreased ADL status;Decreased strength;Decreased endurance;Decreased balance;Decreased high-level IADLs  Treatment Diagnosis: Acute cholecystitis, s/p cholecystectomy  Prognosis: Good  Activity Tolerance  Activity Tolerance: Patient Tolerated treatment well       Plan   Occupational Therapy Plan  Specific Instructions for Next Treatment: AE for footwear  Current Treatment Recommendations: Strengthening, Balance training, Functional mobility training, Patient/Caregiver  in chair    Transfers  Sit to stand: Dependent/Total  Stand to sit: Dependent/Total  Transfer Comments: Utilized kamlesh ko for safety during morning ADLs.    Education  Education Given To: Patient  Education Provided: Mobility Training, Transfer Training, ADL Function  Education Method: Demonstration, Verbal  Barriers to Learning: None  Education Outcome: Verbalized understanding, Demonstrated understanding    Balance  Sitting Balance: Supervision  Standing Balance: Contact guard assistance        Shower/Bathe Self  Assistance Needed: Partial/moderate assistance  Comment: Shower, assistance for john area, buttocks, and feet.  CARE Score: 3  Discharge Goal: Partial/moderate assistance    Upper Body Dressing  Assistance Needed: Supervision or touching assistance  Comment: Supervision for sitting balance.  CARE Score: 4  Discharge Goal: Set-up or clean-up assistance      Lower Body Dressing  Assistance Needed: Dependent  CARE Score: 1  Discharge Goal: Partial/moderate assistance    Putting On/Taking Off Footwear  Assistance Needed: Dependent  CARE Score: 1  Discharge Goal: Partial/moderate assistance      Goals  Short Term Goals  Time Frame for Short Term Goals: 2 weeks  Short Term Goal 1: Mod A with LB dressing, AE PRN.  Short Term Goal 2: Mod A with donning/doffing socks and shoes, AE PRN.  Short Term Goal 3: MET  Short Term Goal 4: Patient will complete toileting with Max A.  Short Term Goal 5: Patient will complete toilet transfers with Max A.  Long Term Goals  Time Frame for Long Term Goals : 3-4 weeks  Long Term Goal 1: Setup with UB dressing.  Long Term Goal 2: Min A with LB dressing.  Long Term Goal 3: Min A with donning/doffing socks and shoes.  Long Term Goal 4: Min A with bathing hygiene.  Long Term Goal 5: Patient verbalize DME needs.  Long Term Goal 6: Mod A with toileting.  Long Term Goal 7: Mod A with toilet transfers.  Patient Goals   Patient goals : Return home independently.    Therapy Time

## 2024-07-16 NOTE — PROGRESS NOTES
Physical Therapy  Name: Kelsey Morejon  MRN:  [id number]  Date of service:  7/16/2024 07/16/24 1100   General   Diagnosis Acute cholecystitis   General Comment   Comments in bed, awake   Subjective   Subjective Pt agreeable. No complaints.   Subjective   Subjective denies pain initially but then reports some discomfort and itching at iv site L arm   Bed mobility   Rolling to Left Modified independent   Rolling to Right Contact guard assistance;Minimal assistance  (oob to L side with side rail)   Transfers   Sit to Stand Contact guard assistance  (both pulling up to // bars from power chair and to SS from bed)   Stand to Sit Contact guard assistance   Bed to Chair Dependent/Total  (bed> power chair>recliner)   PT Exercises   A/AROM Exercises ADD sets with ball x 20   Resistive Exercises man resist x 2 sets of 10: laq, hs curls, hip abd, hip ext   Static Standing Balance Exercises x 4-5 standing bouts in // bars with wt shifting; able to advance R LE but not L due to inability for R LE to fully support body for L LE swing overall dec endurance with need to sit after approx 20\" ea time   Assessment   Assessment Able to stand as well to // bars as to kamlesh maikel though difficulty wbing solely on R LE for L LE advancement. Pt states she transferred with more of pivot technique than stepping technique prior to admission. Will work towards pivots in // bars.   Safety Devices   Type of Devices Call light within reach;Left in chair         Electronically signed by Brissa Wood PTA on 7/16/2024 at 12:07 PM

## 2024-07-16 NOTE — PLAN OF CARE
Problem: Safety - Adult  Goal: Free from fall injury  7/15/2024 1916 by Slick Virgen, RN  Outcome: Progressing  7/15/2024 1651 by Angelina Wynne, RN  Outcome: Progressing

## 2024-07-16 NOTE — PLAN OF CARE
Problem: Discharge Planning  Goal: Discharge to home or other facility with appropriate resources  Outcome: Progressing  Flowsheets (Taken 7/16/2024 0540)  Discharge to home or other facility with appropriate resources: Refer to discharge planning if patient needs post-hospital services based on physician order or complex needs related to functional status, cognitive ability or social support system     Problem: Skin/Tissue Integrity  Goal: Absence of new skin breakdown  Description: 1.  Monitor for areas of redness and/or skin breakdown  2.  Assess vascular access sites hourly  3.  Every 4-6 hours minimum:  Change oxygen saturation probe site  4.  Every 4-6 hours:  If on nasal continuous positive airway pressure, respiratory therapy assess nares and determine need for appliance change or resting period.  Outcome: Progressing     Problem: Safety - Adult  Goal: Free from fall injury  Outcome: Progressing     Problem: ABCDS Injury Assessment  Goal: Absence of physical injury  Outcome: Progressing     Problem: Nutrition Deficit:  Goal: Optimize nutritional status  Outcome: Progressing     Problem: Pain  Goal: Verbalizes/displays adequate comfort level or baseline comfort level  Outcome: Progressing

## 2024-07-17 LAB
ALBUMIN SERPL-MCNC: 2.5 G/DL (ref 3.5–5.2)
ALP SERPL-CCNC: 88 U/L (ref 35–104)
ALT SERPL-CCNC: 23 U/L (ref 5–33)
ANION GAP SERPL CALCULATED.3IONS-SCNC: 9 MMOL/L (ref 7–19)
AST SERPL-CCNC: 18 U/L (ref 5–32)
BASOPHILS # BLD: 0.1 K/UL (ref 0–0.2)
BASOPHILS NFR BLD: 1.3 % (ref 0–1)
BILIRUB SERPL-MCNC: 0.3 MG/DL (ref 0.2–1.2)
BUN SERPL-MCNC: 12 MG/DL (ref 8–23)
CALCIUM SERPL-MCNC: 8.5 MG/DL (ref 8.8–10.2)
CHLORIDE SERPL-SCNC: 111 MMOL/L (ref 98–111)
CO2 SERPL-SCNC: 22 MMOL/L (ref 22–29)
CREAT SERPL-MCNC: 0.7 MG/DL (ref 0.5–0.9)
EOSINOPHIL # BLD: 0.3 K/UL (ref 0–0.6)
EOSINOPHIL NFR BLD: 5.3 % (ref 0–5)
ERYTHROCYTE [DISTWIDTH] IN BLOOD BY AUTOMATED COUNT: 20.8 % (ref 11.5–14.5)
GLUCOSE BLD-MCNC: 142 MG/DL (ref 70–99)
GLUCOSE BLD-MCNC: 147 MG/DL (ref 70–99)
GLUCOSE BLD-MCNC: 152 MG/DL (ref 70–99)
GLUCOSE BLD-MCNC: 85 MG/DL (ref 70–99)
GLUCOSE SERPL-MCNC: 91 MG/DL (ref 74–109)
HCT VFR BLD AUTO: 27.1 % (ref 37–47)
HGB BLD-MCNC: 7.8 G/DL (ref 12–16)
IMM GRANULOCYTES # BLD: 0.2 K/UL
LYMPHOCYTES # BLD: 1.5 K/UL (ref 1.1–4.5)
LYMPHOCYTES NFR BLD: 32 % (ref 20–40)
MAGNESIUM SERPL-MCNC: 1.8 MG/DL (ref 1.6–2.4)
MCH RBC QN AUTO: 28.5 PG (ref 27–31)
MCHC RBC AUTO-ENTMCNC: 28.8 G/DL (ref 33–37)
MCV RBC AUTO: 98.9 FL (ref 81–99)
MONOCYTES # BLD: 0.8 K/UL (ref 0–0.9)
MONOCYTES NFR BLD: 17.5 % (ref 0–10)
NEUTROPHILS # BLD: 1.9 K/UL (ref 1.5–7.5)
NEUTS SEG NFR BLD: 39.5 % (ref 50–65)
PERFORMED ON: ABNORMAL
PERFORMED ON: NORMAL
PLATELET # BLD AUTO: 168 K/UL (ref 130–400)
PMV BLD AUTO: 11.4 FL (ref 9.4–12.3)
POTASSIUM SERPL-SCNC: 4.6 MMOL/L (ref 3.5–5)
PROT SERPL-MCNC: 5.4 G/DL (ref 6.6–8.7)
RBC # BLD AUTO: 2.74 M/UL (ref 4.2–5.4)
SODIUM SERPL-SCNC: 142 MMOL/L (ref 136–145)
VANCOMYCIN TROUGH SERPL-MCNC: 18.7 UG/ML (ref 10–20)
WBC # BLD AUTO: 4.8 K/UL (ref 4.8–10.8)

## 2024-07-17 PROCEDURE — 6370000000 HC RX 637 (ALT 250 FOR IP): Performed by: PSYCHIATRY & NEUROLOGY

## 2024-07-17 PROCEDURE — 99232 SBSQ HOSP IP/OBS MODERATE 35: CPT | Performed by: PSYCHIATRY & NEUROLOGY

## 2024-07-17 PROCEDURE — 97110 THERAPEUTIC EXERCISES: CPT

## 2024-07-17 PROCEDURE — 36592 COLLECT BLOOD FROM PICC: CPT

## 2024-07-17 PROCEDURE — 6360000002 HC RX W HCPCS

## 2024-07-17 PROCEDURE — 82962 GLUCOSE BLOOD TEST: CPT

## 2024-07-17 PROCEDURE — 80053 COMPREHEN METABOLIC PANEL: CPT

## 2024-07-17 PROCEDURE — 1180000000 HC REHAB R&B

## 2024-07-17 PROCEDURE — 6360000002 HC RX W HCPCS: Performed by: INTERNAL MEDICINE

## 2024-07-17 PROCEDURE — 83735 ASSAY OF MAGNESIUM: CPT

## 2024-07-17 PROCEDURE — 2580000003 HC RX 258

## 2024-07-17 PROCEDURE — 97535 SELF CARE MNGMENT TRAINING: CPT

## 2024-07-17 PROCEDURE — 36415 COLL VENOUS BLD VENIPUNCTURE: CPT

## 2024-07-17 PROCEDURE — 80202 ASSAY OF VANCOMYCIN: CPT

## 2024-07-17 PROCEDURE — 85025 COMPLETE CBC W/AUTO DIFF WBC: CPT

## 2024-07-17 PROCEDURE — 94760 N-INVAS EAR/PLS OXIMETRY 1: CPT

## 2024-07-17 PROCEDURE — 2580000003 HC RX 258: Performed by: INTERNAL MEDICINE

## 2024-07-17 PROCEDURE — 97530 THERAPEUTIC ACTIVITIES: CPT

## 2024-07-17 RX ADMIN — TRAMADOL HYDROCHLORIDE 50 MG: 50 TABLET ORAL at 20:47

## 2024-07-17 RX ADMIN — LEFLUNOMIDE 20 MG: 20 TABLET ORAL at 20:26

## 2024-07-17 RX ADMIN — APIXABAN 5 MG: 5 TABLET, FILM COATED ORAL at 08:47

## 2024-07-17 RX ADMIN — FERROUS SULFATE TAB 325 MG (65 MG ELEMENTAL FE) 325 MG: 325 (65 FE) TAB at 08:47

## 2024-07-17 RX ADMIN — ASPIRIN 81 MG: 81 TABLET, COATED ORAL at 08:47

## 2024-07-17 RX ADMIN — ALLOPURINOL 100 MG: 100 TABLET ORAL at 08:47

## 2024-07-17 RX ADMIN — FOLIC ACID 1 MG: 1 TABLET ORAL at 08:47

## 2024-07-17 RX ADMIN — MICONAZOLE NITRATE: 2 POWDER TOPICAL at 08:54

## 2024-07-17 RX ADMIN — PREDNISONE 5 MG: 5 TABLET ORAL at 08:47

## 2024-07-17 RX ADMIN — DULOXETINE HYDROCHLORIDE 60 MG: 60 CAPSULE, DELAYED RELEASE ORAL at 08:53

## 2024-07-17 RX ADMIN — LEVOTHYROXINE SODIUM 75 MCG: 75 TABLET ORAL at 05:56

## 2024-07-17 RX ADMIN — Medication 1 TABLET: at 08:47

## 2024-07-17 RX ADMIN — CEFEPIME 2000 MG: 2 INJECTION, POWDER, FOR SOLUTION INTRAVENOUS at 20:25

## 2024-07-17 RX ADMIN — LOSARTAN POTASSIUM 50 MG: 50 TABLET, FILM COATED ORAL at 08:47

## 2024-07-17 RX ADMIN — MICONAZOLE NITRATE: 2 POWDER TOPICAL at 22:03

## 2024-07-17 RX ADMIN — METHENAMINE HIPPURATE 1 G: 1000 TABLET ORAL at 08:48

## 2024-07-17 RX ADMIN — CEFEPIME 2000 MG: 2 INJECTION, POWDER, FOR SOLUTION INTRAVENOUS at 09:08

## 2024-07-17 RX ADMIN — Medication: at 08:54

## 2024-07-17 RX ADMIN — PANTOPRAZOLE SODIUM 40 MG: 40 TABLET, DELAYED RELEASE ORAL at 08:47

## 2024-07-17 RX ADMIN — Medication 500 MG: at 08:47

## 2024-07-17 RX ADMIN — Medication 1000 MG: at 05:03

## 2024-07-17 RX ADMIN — APIXABAN 5 MG: 5 TABLET, FILM COATED ORAL at 20:27

## 2024-07-17 RX ADMIN — METHENAMINE HIPPURATE 1 G: 1000 TABLET ORAL at 16:47

## 2024-07-17 RX ADMIN — COLCHICINE 0.6 MG: 0.6 TABLET, FILM COATED ORAL at 08:48

## 2024-07-17 ASSESSMENT — PAIN SCALES - GENERAL
PAINLEVEL_OUTOF10: 0
PAINLEVEL_OUTOF10: 4
PAINLEVEL_OUTOF10: 0

## 2024-07-17 ASSESSMENT — PAIN DESCRIPTION - DESCRIPTORS: DESCRIPTORS: ACHING

## 2024-07-17 ASSESSMENT — PAIN DESCRIPTION - LOCATION: LOCATION: BACK

## 2024-07-17 NOTE — PROGRESS NOTES
Patient:   Kelsey Morejon  MR#:    [id number]   Room:    [facility]-   YOB: 1955  Date of Progress Note: 7/17/2024  Time of Note                           7:39 AM  Consulting Physician:   Guy Stubbs M.D.  Attending Physician:  Guy Stubbs MD     Chief complaint Status post cholecystectomy     S:This 69 y.o. 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 Patient admitted on 6/13/2024 with AMS. She was found to have UTI  and left ureteral calculus with obstruction and acute hypoxic respiratory failure. She has been treated with Cefepime. Lithotripsy performed on 6/21/2024. GI was consulted on 6/21/2024 for abdominal pain. GB US showed cholelithiasis with small stones and sludge. HIDA scan done showed Non visualization of the gallbladder and could not rule out cystic duct obstruction.  On 07/03/24 she underwent a CHOLECYSTECTOMY LAPAROSCOPIC WITH INTRAOPERATIVE CHOLANGIOGRAM by   Lexa Waddell MD. She tolerated procedure well.   She does have a wound on her R buttock from cyst removal by Dr. Waddell. The wound has opened up and has been dressed w/ wet-to-dry dressings by Home Health. Wound measures 1.2cm x 2 cm x 2.5. Undermining circumferentially reaching 5cm at 4 o'clock. Subcutaneous tissue, wound bed. Small amount of serosanguineous drainage. Aixa wound area is slight erythema but blanchable.  She was complaining about some difficulty swallowing. Speech evaluated her. She is now tolerating a regular diet, thin liquids with meds whole in applesauce.   She is now medically stable and is participating with therapy. She is ready to begin rehab with goal of returning home after rehab dc with support from her caregivers and family.  Previously syncope after large bowel movement.  Was hypotensive.  Noted to have urinary tract infection.  Fluid bolus was given and medications were adjusted.   for 2nd PT. States legs tired and feels that am is best for standing practice   Transfers   Sit to Stand Contact guard assistance  (pulling up to SS from recliner and power chair)   Stand to Sit Contact guard assistance   Bed to Chair Dependent/Total  (recliner>power chair>recliner via SS)   PT Exercises   A/AROM Exercises QS x 20, ADD sets x 20   Resistive Exercises man resist 2 sets of 10: laq, ankle PF/DF, hip flex, hip ext, hip abd, hs curls   Static Standing Balance Exercises brief standing only in SS; endurance dec this afternoon and suggests she does better in am   Safety Devices   Type of Devices Call light within reach;Left in chair   PT Individual Minutes   Time In 1300   Time Out 1355   Minutes 55            Electronically signed by Brissa Wood PTA on 7/16/2024 at 2:44 PM                             RECORD REVIEW: Previous medical records, medications were reviewed at today's visit    IMPRESSION:   1.  Status post cholecystectomy-monitor  2.  Gout-allopurinol/colchicine  3.  Coronary artery disease-aspirin/Imdur  4.  Hypertension-on meds monitor  5.  Anemia-on iron  6.  Rheumatoid arthritis-Arava/prednisone  7.  Urinary incontinence-Hiprex  8.  Wound left buttock-wound care  9.  GI-PPI/bowel regimen  10.  Pain control-Percocet as needed/tramadol as needed/Lidoderm patch  11.  Hypothyroidism-Synthroid  12.  Myalgia due to statin  13.  PT/OT     Appreciate hospitalist help  UTI-complete antibiotic    Hypotension-drop of systolic blood pressure -Lasix, spironolactone, and Coreg held -fluid bolus given -improved    Wound infection-on vancomycin    Continue present care as noted    DANIELA July 26 th    Expected duration and frequency therapy: 180 minutes per day, 5 days per week    CALL WITH ANY QUESTIONS  [phone] CELL  Dr Guy Stubbs

## 2024-07-17 NOTE — PROGRESS NOTES
[place] [facility]   Pharmacy Pharmacokinetic Monitoring Service - Vancomycin    Consulting Provider: Dr. Richter  Indication: Skin and soft issue Infection  Target Concentration: Goal AUC/TEMI 400-600 mg*hr/L  Day of Therapy: 3  Additional Antimicrobials: cefepime    Pertinent Laboratory Values:   Wt Readings from Last 1 Encounters:   07/13/24 89.4 kg (197 lb)     Temp Readings from Last 1 Encounters:   07/17/24 (!) 96.6 °F (35.9 °C) (Temporal)     Estimated Creatinine Clearance: 83 mL/min (based on SCr of 0.7 mg/dL).  Recent Labs     07/16/24  0602 07/17/24  0440   CREATININE 0.6 0.7   BUN 11 12   WBC 5.7 4.8       Pertinent Cultures:  Culture Date Source Results   7/10/24 urine Enterobacter cloacae complex,  Candida albicans   7/13/24 Wound from buttock MRSA, light growth   MRSA Nasal Swab: N/A. Non-respiratory infection.    Recent vancomycin administrations                     vancomycin (VANCOCIN) 1000 mg in sodium chloride 0.9% 250 mL IVPB (mg) 1,000 mg New Bag 07/17/24 0503     1,000 mg New Bag 07/16/24 1724     1,000 mg New Bag  0437    vancomycin (VANCOCIN) 2,000 mg in sodium chloride 0.9 % 500 mL IVPB (mg) 2,000 mg New Bag 07/15/24 1717                    Assessment:  Date/Time Current Dose Concentration Timing of Concentration (h) AUC   7/17/24 0630 1000mg IV q12h 18.7 11 h 16 min 647   Note: Serum concentrations collected for AUC dosing may appear elevated if collected in close proximity to the dose administered, this is not necessarily an indication of toxicity    Plan:  Current dosing regimen is supra-therapeutic  \"Decrease dose to 1500mg IV q24h  Repeat vancomycin concentration ordered for 7/19 @ 1930   Pharmacy will continue to monitor patient and adjust therapy as indicated    Thank you for the consult,  Alison Layne RPH  7/17/2024 6:27 AM

## 2024-07-17 NOTE — PLAN OF CARE
Problem: Safety - Adult  Goal: Free from fall injury  7/16/2024 1906 by Slick Virgen, RN  Outcome: Progressing  7/16/2024 0954 by Marycarmen Mott, RN  Outcome: Progressing

## 2024-07-17 NOTE — PROGRESS NOTES
[facility]    Patient:  Kelsey Morejon  YOB: 1955  Date of Service: 7/17/2024  MRN: [id number]   Acct: [id number]   Primary Care Physician: Rafael Layne MD  Advance Directive: DNR  Admit Date: 7/8/2024       Hospital Day: 9     NOTE: Portions of this note have been copied forward, however, changes made to reflect the most current clinical status of this patient.    CHIEF COMPLAINT:  Medical Management      7/17/2024 7:23 AM  Subjective / Interval History:   07/17/2024  Patient seen and examined this a.m. No new complaints.  No acute changes or acute overnight event reported. Sitting comfortably in chair in no apparent acute distress.  Denies any acute complaints or distress.  Endorses overall improvement and tolerating rehab       07/14/2024  Patient seen and examined this a.m. No new complaints.  No acute changes or acute overnight event reported. Laying comfortably in bed in no apparent acute distress.  Denies any acute complaints or distress.        07/12/2024  Patient seen and examined this a.m. No new complaints.  No acute changes or acute overnight event reported. Sitting comfortably in bed in no apparent acute distress.  Denies any acute complaints or distress.  Denies any dizziness or lightheadedness    Endorses overall improvement.            Review of Systems:   Review of Systems  ROS: 14 point review of systems is negative except as specifically addressed above.    ADULT DIET; Regular; No scrambled eggs  ADULT ORAL NUTRITION SUPPLEMENT; Breakfast, Dinner; Low Calorie/High Protein Oral Supplement    Intake/Output Summary (Last 24 hours) at 7/17/2024 0723  Last data filed at 7/17/2024 0646  Gross per 24 hour   Intake 360 ml   Output 1300 ml   Net -940 ml         Medications:   dextrose       Current Facility-Administered Medications   Medication Dose Route Frequency Provider Last Rate Last Admin    vancomycin (VANCOCIN) 1500 mg in sodium chloride 0.9 % 250 mL  activity within the hepatic parenchyma with biliary activity identified 15 to 20 minutes post injection. There is no evidence of common bile duct obstruction with activity emptied from the common duct into the C-loop of the duodenum and proximal jejunum. There is no visualization of the gallbladder at 60 minutes. An additional delayed image of 90 minutes also demonstrates nonvisualization of the gallbladder.   Impression 1. Nonvisualization of the gallbladder on images obtained up to 90 minutes. Cystic duct obstruction and acute cholecystitis cannot be excluded on the basis of this exam. 2. No evidence of common bile duct obstruction with emptying of activity into the C-loop of the duodenum and proximal jejunum. 3. Gallstones and sludge were demonstrated on recent ultrasound. This combined with nonvisualization of the gallbladder exclude performance of an ejection fraction.. This report was signed and finalized on 6/22/2024 3:55 PM by Dr. Vel Guerrero MD.    US Gallbladder    Result Date: 6/22/2024  US GALLBLADDER- HISTORY: Quadrant pain, gallstones COMPARISON: CT abdomen pelvis 6/20/2024 FINDINGS: Sonographic imaging of the right upper quadrant is performed. Images of the pancreas are unremarkable. The proximal and mid abdominal aorta are normal in caliber. Distal aorta is obscured by overlying shadowing bowel gas. Proximal IVC is patent. No focal liver mass is identified. Appropriate directional flow within the main portal vein. Gallbladder is moderately distended containing small shadowing stones and sludge within the region of the gallbladder neck. No gallbladder wall thickening or pericholecystic fluid is visualized. Common bile duct is normal in caliber measuring 4 mm. Right kidney appears normal measuring 10.9 cm in length.    1. Cholelithiasis with small shadowing stones and sludge within the region of the gallbladder neck. Moderate distention of the gallbladder, however no gallbladder wall thickening or

## 2024-07-17 NOTE — PROGRESS NOTES
07/17/24 1430   Restrictions/Precautions   Restrictions/Precautions Fall Risk;Isolation   General   Chart Reviewed Yes   Additional Pertinent Hx RA, pacemaker, CAD, MI, DVT, CHF, neurogenic bladder, HTN, charcot's joint, psoriasis vulgaris, osteoporosis, hypothyroidism, cervical spinal stenosis   Diagnosis Acute cholecystitis   Transfers   Sit to Stand Contact guard assistance   Stand to Sit Contact guard assistance   PT Exercises   Exercise Treatment // bar: STS x3 with weight shifts   Assessment   Assessment Pt reports that her legs are tired following morning session. Pt performed pull to  kamlesh steady to transfer to wheelchair. Performed Sit/pull to  // bars with weight shifts. Continues to show improvement with sit to stand transfers. Continues to have decreased strength and ROM in R LE compared to L.   History Acute cholecystitis   Safety Devices   Type of Devices Left in chair  (w/ OT)   PT Individual Minutes   Time In 1430   Time Out 1515   Minutes 45

## 2024-07-17 NOTE — PROGRESS NOTES
education & training, Equipment evaluation, education, & procurement, Safety education & training, Self-Care / ADL, Home management training     Restrictions  Restrictions/Precautions  Restrictions/Precautions: Fall Risk, Isolation    Subjective   General  Chart Reviewed: Yes  Patient assessed for rehabilitation services?: Yes  Response to previous treatment: Patient with no complaints from previous session  Family / Caregiver Present: Yes  Diagnosis: Acute cholecystitis, s/p cholecystectomy  General Comment  Comments: Pt. ready to participate in tx this AM.    Social/Functional History  Social/Functional History  Lives With: Home care staff (24/7 care with 2 cargivers taking turns.)  Type of Home: House  Home Layout: One level  Home Access: Level entry  Bathroom Shower/Tub: Walk-in shower  Bathroom Equipment: Grab bars around toilet, Grab bars in shower, 3-in-1 commode, Shower chair (with handles)  Home Equipment: Slide board, Wheelchair - Electric, Wheelchair - Manual, Walker - Rolling, Lift chair  Has the patient had two or more falls in the past year or any fall with injury in the past year?: No  Homemaking Responsibilities: No  Ambulation Assistance: Needs assistance (CGA)  Transfer Assistance: Needs assistance (CGA)  IADL Comments: Caregiver does all homemaking tasks.    Objective   Functional Mobility  Functional - Mobility Device: Other (Electric w/c.)  Activity: To/From therapy gym, Other, To/from bathroom  Assist Level: Supervision  Functional Mobility Comments: To/from therapy gym, in OT gym, bathroom, and pt. room.  Temp: (!) 96.6 °F (35.9 °C)  Pulse: 88  Heart Rate Source: Monitor  Respirations: 16  SpO2: 97 %  O2 Device: None (Room air)  BP: (!) 155/73  MAP (Calculated): 100  BP Location: Right lower arm  Patient Position: Supine    Safety Devices  Type of Devices: Call light within reach;Chair alarm in place    Toilet Transfers  Toilet - Technique: Stand step  Equipment Used: Standard bedside commode

## 2024-07-17 NOTE — PROGRESS NOTES
Facility/Department: [facility] 8 REHAB UNIT  Occupational Therapy     Name: Kelsey Morejon  : 1955  MRN: [id number]  Date of Service: 2024    Discharge Recommendations:  Continue to assess pending progress    Patient Diagnosis(es): There were no encounter diagnoses.  Past Medical History:  has a past medical history of CAD (coronary artery disease), Cellulitis, History of blood transfusion, History of DVT (deep vein thrombosis), History of neutropenia, History of pulmonary fibrosis, Hx of blood clots, Hypercholesterolemia, Hypertension, Intrinsic asthma, Pacemaker, Post op infection, Rheumatoid arteritis (HCC), Sinus node dysfunction (HCC), Staph aureus infection, Status post placement of implantable loop recorder, Syncope, and Thyroid disease.  Past Surgical History:  has a past surgical history that includes Diagnostic Cardiac Cath Lab Procedure; LEEP; Coronary angioplasty with stent (); Pacemaker insertion (2016); back surgery (2018); pr arthrp kne condyle&platu medial&lat compartments (Right, 2018); pr i&d deep absc bursa/hematoma thigh/knee region (Right, 10/23/2018); Cardiac catheterization (14  MDL); Cardiac catheterization (1/26/15  MDL); pacemaker placement; joint replacement; Revision total knee arthroplasty (Right, 2018); and Cholecystectomy (2024).    Treatment Diagnosis: Acute cholecystitis, s/p cholecystectomy      Assessment   Performance deficits / Impairments: Decreased functional mobility ;Decreased ADL status;Decreased strength;Decreased endurance;Decreased balance;Decreased high-level IADLs  Treatment Diagnosis: Acute cholecystitis, s/p cholecystectomy  Prognosis: Good  Activity Tolerance  Activity Tolerance: Patient Tolerated treatment well       Plan   Occupational Therapy Plan  Specific Instructions for Next Treatment: AE for footwear  Current Treatment Recommendations: Strengthening, Balance training, Functional mobility training, Patient/Caregiver  kamlesh ko for transfer from electric w/c to bed.    Exercise Treatment: Pulley: 2 sets x 1 minute.    Education  Education Given To: Patient  Education Provided: Mobility Training, Transfer Training, IADL Function  Education Method: Demonstration, Verbal  Barriers to Learning: None  Education Outcome: Verbalized understanding, Demonstrated understanding    Balance  Sitting Balance: Supervision  Standing Balance: Contact guard assistance      Goals  Short Term Goals  Time Frame for Short Term Goals: 2 weeks  Short Term Goal 1: Mod A with LB dressing, AE PRN.  Short Term Goal 2: MET  Short Term Goal 3: MET  Short Term Goal 4: Patient will complete toileting with Max A.  Short Term Goal 5: MET  Long Term Goals  Time Frame for Long Term Goals : 3-4 weeks  Long Term Goal 1: Setup with UB dressing.  Long Term Goal 2: Min A with LB dressing.  Long Term Goal 3: Min A with donning/doffing socks and shoes.  Long Term Goal 4: Min A with bathing hygiene.  Long Term Goal 5: Patient verbalize DME needs.  Long Term Goal 6: Mod A with toileting.  Long Term Goal 7: MET  Patient Goals   Patient goals : Return home independently.       Therapy Time   Individual Concurrent Group Co-treatment   Time In 1515     1500   Time Out 1530     1515   Minutes 15     15   Timed Code Treatment Minutes: 30 Minutes    Electronically signed by MERY Cardenas on 7/17/2024 at 3:57 PM

## 2024-07-18 LAB
BASOPHILS # BLD: 0.1 K/UL (ref 0–0.2)
BASOPHILS NFR BLD: 1.2 % (ref 0–1)
EOSINOPHIL # BLD: 0.3 K/UL (ref 0–0.6)
EOSINOPHIL NFR BLD: 5.6 % (ref 0–5)
ERYTHROCYTE [DISTWIDTH] IN BLOOD BY AUTOMATED COUNT: 20.7 % (ref 11.5–14.5)
GLUCOSE BLD-MCNC: 119 MG/DL (ref 70–99)
GLUCOSE BLD-MCNC: 82 MG/DL (ref 70–99)
HCT VFR BLD AUTO: 25.9 % (ref 37–47)
HGB BLD-MCNC: 7.5 G/DL (ref 12–16)
IMM GRANULOCYTES # BLD: 0.2 K/UL
LYMPHOCYTES # BLD: 1.5 K/UL (ref 1.1–4.5)
LYMPHOCYTES NFR BLD: 30.6 % (ref 20–40)
MCH RBC QN AUTO: 28.5 PG (ref 27–31)
MCHC RBC AUTO-ENTMCNC: 29 G/DL (ref 33–37)
MCV RBC AUTO: 98.5 FL (ref 81–99)
MONOCYTES # BLD: 0.8 K/UL (ref 0–0.9)
MONOCYTES NFR BLD: 17.5 % (ref 0–10)
NEUTROPHILS # BLD: 1.9 K/UL (ref 1.5–7.5)
NEUTS SEG NFR BLD: 40.1 % (ref 50–65)
PERFORMED ON: ABNORMAL
PERFORMED ON: NORMAL
PLATELET # BLD AUTO: 171 K/UL (ref 130–400)
PMV BLD AUTO: 12.2 FL (ref 9.4–12.3)
RBC # BLD AUTO: 2.63 M/UL (ref 4.2–5.4)
WBC # BLD AUTO: 4.8 K/UL (ref 4.8–10.8)

## 2024-07-18 PROCEDURE — 6360000002 HC RX W HCPCS: Performed by: INTERNAL MEDICINE

## 2024-07-18 PROCEDURE — 6370000000 HC RX 637 (ALT 250 FOR IP): Performed by: PSYCHIATRY & NEUROLOGY

## 2024-07-18 PROCEDURE — 97110 THERAPEUTIC EXERCISES: CPT

## 2024-07-18 PROCEDURE — 2580000003 HC RX 258: Performed by: PSYCHIATRY & NEUROLOGY

## 2024-07-18 PROCEDURE — 2580000003 HC RX 258: Performed by: INTERNAL MEDICINE

## 2024-07-18 PROCEDURE — 85025 COMPLETE CBC W/AUTO DIFF WBC: CPT

## 2024-07-18 PROCEDURE — 97116 GAIT TRAINING THERAPY: CPT

## 2024-07-18 PROCEDURE — 1180000000 HC REHAB R&B

## 2024-07-18 PROCEDURE — 99232 SBSQ HOSP IP/OBS MODERATE 35: CPT | Performed by: PSYCHIATRY & NEUROLOGY

## 2024-07-18 PROCEDURE — 82962 GLUCOSE BLOOD TEST: CPT

## 2024-07-18 PROCEDURE — 94760 N-INVAS EAR/PLS OXIMETRY 1: CPT

## 2024-07-18 PROCEDURE — 6360000002 HC RX W HCPCS: Performed by: PSYCHIATRY & NEUROLOGY

## 2024-07-18 PROCEDURE — 97535 SELF CARE MNGMENT TRAINING: CPT

## 2024-07-18 PROCEDURE — 97530 THERAPEUTIC ACTIVITIES: CPT

## 2024-07-18 PROCEDURE — 6370000000 HC RX 637 (ALT 250 FOR IP): Performed by: INTERNAL MEDICINE

## 2024-07-18 RX ADMIN — ONDANSETRON 4 MG: 4 TABLET, ORALLY DISINTEGRATING ORAL at 10:57

## 2024-07-18 RX ADMIN — LEFLUNOMIDE 20 MG: 20 TABLET ORAL at 20:46

## 2024-07-18 RX ADMIN — APIXABAN 5 MG: 5 TABLET, FILM COATED ORAL at 08:29

## 2024-07-18 RX ADMIN — PREDNISONE 5 MG: 5 TABLET ORAL at 08:29

## 2024-07-18 RX ADMIN — LEVOTHYROXINE SODIUM 75 MCG: 75 TABLET ORAL at 06:36

## 2024-07-18 RX ADMIN — ALLOPURINOL 100 MG: 100 TABLET ORAL at 08:29

## 2024-07-18 RX ADMIN — ESTRADIOL 2 G: 0.1 CREAM VAGINAL at 08:29

## 2024-07-18 RX ADMIN — FOLIC ACID 1 MG: 1 TABLET ORAL at 08:29

## 2024-07-18 RX ADMIN — METHENAMINE HIPPURATE 1 G: 1000 TABLET ORAL at 08:29

## 2024-07-18 RX ADMIN — Medication 1 TABLET: at 08:29

## 2024-07-18 RX ADMIN — TRAMADOL HYDROCHLORIDE 50 MG: 50 TABLET ORAL at 20:45

## 2024-07-18 RX ADMIN — DULOXETINE HYDROCHLORIDE 60 MG: 60 CAPSULE, DELAYED RELEASE ORAL at 08:29

## 2024-07-18 RX ADMIN — METHENAMINE HIPPURATE 1 G: 1000 TABLET ORAL at 18:01

## 2024-07-18 RX ADMIN — APIXABAN 5 MG: 5 TABLET, FILM COATED ORAL at 20:46

## 2024-07-18 RX ADMIN — Medication 1500 MG: at 00:25

## 2024-07-18 RX ADMIN — ASPIRIN 81 MG: 81 TABLET, COATED ORAL at 08:29

## 2024-07-18 RX ADMIN — MICONAZOLE NITRATE: 2 POWDER TOPICAL at 08:30

## 2024-07-18 RX ADMIN — FERROUS SULFATE TAB 325 MG (65 MG ELEMENTAL FE) 325 MG: 325 (65 FE) TAB at 08:29

## 2024-07-18 RX ADMIN — SODIUM CHLORIDE 125 MG: 9 INJECTION, SOLUTION INTRAVENOUS at 08:45

## 2024-07-18 RX ADMIN — COLCHICINE 0.6 MG: 0.6 TABLET, FILM COATED ORAL at 08:29

## 2024-07-18 RX ADMIN — LOSARTAN POTASSIUM 50 MG: 50 TABLET, FILM COATED ORAL at 08:29

## 2024-07-18 RX ADMIN — PANTOPRAZOLE SODIUM 40 MG: 40 TABLET, DELAYED RELEASE ORAL at 08:29

## 2024-07-18 RX ADMIN — Medication 500 MG: at 08:28

## 2024-07-18 RX ADMIN — Medication: at 08:30

## 2024-07-18 ASSESSMENT — PAIN DESCRIPTION - LOCATION: LOCATION: SHOULDER

## 2024-07-18 ASSESSMENT — PAIN - FUNCTIONAL ASSESSMENT: PAIN_FUNCTIONAL_ASSESSMENT: ACTIVITIES ARE NOT PREVENTED

## 2024-07-18 ASSESSMENT — PAIN SCALES - GENERAL
PAINLEVEL_OUTOF10: 4
PAINLEVEL_OUTOF10: 2

## 2024-07-18 ASSESSMENT — PAIN DESCRIPTION - DESCRIPTORS: DESCRIPTORS: ACHING

## 2024-07-18 ASSESSMENT — PAIN DESCRIPTION - ORIENTATION: ORIENTATION: LEFT

## 2024-07-18 NOTE — PROGRESS NOTES
07/18/24 1300   Restrictions/Precautions   Restrictions/Precautions Fall Risk;Isolation   General   Chart Reviewed Yes   Additional Pertinent Hx RA, pacemaker, CAD, MI, DVT, CHF, neurogenic bladder, HTN, charcot's joint, psoriasis vulgaris, osteoporosis, hypothyroidism, cervical spinal stenosis   Diagnosis Acute cholecystitis   Transfers   Sit to Stand Contact guard assistance  (pull to  kamlesh steady)   Stand to Sit Contact guard assistance   PT Exercises   Exercise Treatment // bars: standing weaight shifts x3; pivot practice to L x3   A/AROM Exercises seated bilat LE exercises: marches, knee ext, heel/toe raises, Hip ABD/ADD x10 each   Assessment   Assessment Pt performed standing weight shifts and pivot practice in // bars. Tolerated standing well; continues to have increased R knee flex compared to L but pt states that it is her baseline. Pivot practice simulated transfers following discharge. pt continues to show slight increase in bilat LE strength compared to evaluation.   History Acute cholecystitis   Safety Devices   Type of Devices Gait belt;Left in chair  (w/ OT)   PT Individual Minutes   Time In 1300   Time Out 1345   Minutes 45

## 2024-07-18 NOTE — PROGRESS NOTES
Patient:   Kelsey Morejon  MR#:    [id number]   Room:    [facility]820-   YOB: 1955  Date of Progress Note: 7/18/2024  Time of Note                           7:18 AM  Consulting Physician:   Guy Stubbs M.D.  Attending Physician:  Guy Stubbs MD     Chief complaint Status post cholecystectomy     S:This 69 y.o. 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 Patient admitted on 6/13/2024 with AMS. She was found to have UTI  and left ureteral calculus with obstruction and acute hypoxic respiratory failure. She has been treated with Cefepime. Lithotripsy performed on 6/21/2024. GI was consulted on 6/21/2024 for abdominal pain. GB US showed cholelithiasis with small stones and sludge. HIDA scan done showed Non visualization of the gallbladder and could not rule out cystic duct obstruction.  On 07/03/24 she underwent a CHOLECYSTECTOMY LAPAROSCOPIC WITH INTRAOPERATIVE CHOLANGIOGRAM by   Lexa Waddell MD. She tolerated procedure well.   She does have a wound on her R buttock from cyst removal by Dr. Waddell. The wound has opened up and has been dressed w/ wet-to-dry dressings by Home Health. Wound measures 1.2cm x 2 cm x 2.5. Undermining circumferentially reaching 5cm at 4 o'clock. Subcutaneous tissue, wound bed. Small amount of serosanguineous drainage. Aixa wound area is slight erythema but blanchable.  She was complaining about some difficulty swallowing. Speech evaluated her. She is now tolerating a regular diet, thin liquids with meds whole in applesauce.   She is now medically stable and is participating with therapy. She is ready to begin rehab with goal of returning home after rehab dc with support from her caregivers and family.  Previously syncope after large bowel movement.  Was hypotensive.  Noted to have urinary tract infection.  Fluid bolus was given and medications were adjusted.   pallor.  Psychiatric - mood, affect, and behavior appear normal.      Neurological exam  Awake, alert, fluent oriented appropriate affect  Attention and concentration appear appropriate  Recent and remote memory appears unremarkable  Speech normal without dysarthria  No clear issues with language of fund of knowledge     Cranial Nerve Exam     CN III, IV,VI-EOMI, No nystagmus, conjugate eye movements, no ptosis    CN VII-no facial assymetry       Motor Exam  antigravity throughout upper extremities bilaterally      Tremors- no tremors in hands or head noted     Gait  Not tested     Nursing/pcp notes, imaging,labs and vitals reviewed.     PT,OT and/or speech notes reviewed    Lab Results   Component Value Date    WBC 4.8 07/18/2024    HGB 7.5 (L) 07/18/2024    HCT 25.9 (L) 07/18/2024    MCV 98.5 07/18/2024     07/18/2024     Lab Results   Component Value Date     07/17/2024    K 4.6 07/17/2024     07/17/2024    CO2 22 07/17/2024    BUN 12 07/17/2024    CREATININE 0.7 07/17/2024    GLUCOSE 91 07/17/2024    CALCIUM 8.5 (L) 07/17/2024    BILITOT 0.3 07/17/2024    ALKPHOS 88 07/17/2024    AST 18 07/17/2024    ALT 23 07/17/2024    LABGLOM >90 07/17/2024    GFRAA >59 11/01/2021     Lab Results   Component Value Date    INR 1.50 (H) 10/28/2021    INR 1.20 (H) 12/05/2018    INR 1.14 09/04/2018    PROTIME 18.2 (H) 10/28/2021    PROTIME 15.2 (H) 12/05/2018    PROTIME 14.5 09/04/2018         Brissa Wood PTA  Physical Therapist Assistant  Physical Therapy     Progress Notes      Cosign Needed     Date of Service: 7/16/2024  2:44 PM     Cosign Needed         Physical Therapy  Name: Kelsey Morejon  MRN:  [id number]  Date of service:  7/16/2024 07/16/24 1300   General Comment   Comments in recliner post lunch   Subjective   Subjective Pt ready for 2nd PT. States legs tired and feels that am is best for standing practice   Transfers   Sit to Stand Contact guard assistance  (pulling up to SS from

## 2024-07-18 NOTE — PROGRESS NOTES
[facility]    Patient:  Kelsey Morejon  YOB: 1955  Date of Service: 7/20/2024  MRN: [id number]   Acct: [id number]   Primary Care Physician: Rafael Layne MD  Advance Directive: DNR  Admit Date: 7/8/2024       Hospital Day: 12     NOTE: Portions of this note have been copied forward, however, changes made to reflect the most current clinical status of this patient.    CHIEF COMPLAINT:  Medical Management      7/20/2024 11:13 AM  Subjective / Interval History:   07/20/2024  Patient seen and examined this a.m. No new complaints.  No acute changes or acute overnight event reported. Laying comfortably in bed in no apparent acute distress.  Denies any acute complaints or distress.  Endorses overall improvement.      Potassium of 5.2 this a.m.    07/17/2024  Patient seen and examined this a.m. No new complaints.  No acute changes or acute overnight event reported. Sitting comfortably in chair in no apparent acute distress.  Denies any acute complaints or distress.  Endorses overall improvement and tolerating rehab       07/14/2024  Patient seen and examined this a.m. No new complaints.  No acute changes or acute overnight event reported. Laying comfortably in bed in no apparent acute distress.  Denies any acute complaints or distress.        07/12/2024  Patient seen and examined this a.m. No new complaints.  No acute changes or acute overnight event reported. Sitting comfortably in bed in no apparent acute distress.  Denies any acute complaints or distress.  Denies any dizziness or lightheadedness    Endorses overall improvement.            Review of Systems:   Review of Systems  ROS: 14 point review of systems is negative except as specifically addressed above.    ADULT DIET; Regular; No scrambled eggs  ADULT ORAL NUTRITION SUPPLEMENT; Breakfast, Dinner; Low Calorie/High Protein Oral Supplement    Intake/Output Summary (Last 24 hours) at 7/20/2024 1113  Last data filed at  levothyroxine (SYNTHROID) tablet 75 mcg  75 mcg Oral Daily Guy Stubbs MD   75 mcg at 07/20/24 0616    lidocaine 4 % external patch 1 patch  1 patch TransDERmal Daily Guy Stubbs MD   1 patch at 07/18/24 0829    losartan (COZAAR) tablet 50 mg  50 mg Oral Daily Guy Stubbs MD   50 mg at 07/20/24 0751    methenamine (HIPREX) tablet 1 g (Patient Supplied)  1 g Oral BID WC Guy Stubbs MD   1 g at 07/20/24 0750    therapeutic multivitamin-minerals 1 tablet  1 tablet Oral Daily Guy Stubbs MD   1 tablet at 07/20/24 0751    nitroGLYCERIN (NITROSTAT) SL tablet 0.4 mg  0.4 mg SubLINGual Q5 Min PRN Guy Stubbs MD        oxyCODONE-acetaminophen (PERCOCET) 5-325 MG per tablet 1 tablet  1 tablet Oral Q6H PRN Guy Stubbs MD   1 tablet at 07/12/24 0530    pantoprazole (PROTONIX) tablet 40 mg  40 mg Oral Daily Guy Stubbs MD   40 mg at 07/20/24 0751    predniSONE (DELTASONE) tablet 5 mg  5 mg Oral Daily Guy Stubbs MD   5 mg at 07/20/24 0751    [Held by provider] spironolactone (ALDACTONE) tablet 25 mg  25 mg Oral Daily Guy Stubbs MD   25 mg at 07/10/24 0851    traMADol (ULTRAM) tablet 50 mg  50 mg Oral Q12H PRN Guy Stubbs MD   50 mg at 07/19/24 2013    vitamin C tablet 500 mg  500 mg Oral Daily Guy Stubbs MD   500 mg at 07/20/24 0751    acetaminophen (TYLENOL) tablet 650 mg  650 mg Oral Q4H PRN Guy Stubbs MD   650 mg at 07/12/24 0838    polyethylene glycol (GLYCOLAX) packet 17 g  17 g Oral Daily PRN Guy Stubbs MD        glucose chewable tablet 16 g  4 tablet Oral PRN Guy Stubbs MD        dextrose bolus 10% 125 mL  125 mL IntraVENous PRN Guy Stubbs MD        Or    dextrose bolus 10% 250 mL  250 mL IntraVENous PRN Guy Stubbs MD        glucagon injection 1 mg  1 mg SubCUTAneous PRN Guy Stubbs MD        dextrose 10 % infusion   IntraVENous Continuous PRN Guy Stubbs MD        [Held by provider] isosorbide mononitrate (IMDUR) extended release tablet 60

## 2024-07-18 NOTE — PLAN OF CARE
Problem: Discharge Planning  Goal: Discharge to home or other facility with appropriate resources  7/17/2024 2339 by Malorie Hua RN  Outcome: Progressing  Flowsheets (Taken 7/17/2024 1945)  Discharge to home or other facility with appropriate resources: Refer to discharge planning if patient needs post-hospital services based on physician order or complex needs related to functional status, cognitive ability or social support system  7/17/2024 1033 by Angelina Wynne RN  Outcome: Progressing     Problem: Skin/Tissue Integrity  Goal: Absence of new skin breakdown  Description: 1.  Monitor for areas of redness and/or skin breakdown  2.  Assess vascular access sites hourly  3.  Every 4-6 hours minimum:  Change oxygen saturation probe site  4.  Every 4-6 hours:  If on nasal continuous positive airway pressure, respiratory therapy assess nares and determine need for appliance change or resting period.  7/17/2024 2339 by Malorie Hua RN  Outcome: Progressing  7/17/2024 1033 by Angelina Wynne RN  Outcome: Progressing     Problem: Safety - Adult  Goal: Free from fall injury  7/17/2024 2339 by Malorie Hua RN  Outcome: Progressing  7/17/2024 1033 by Angelina Wynne RN  Outcome: Progressing     Problem: ABCDS Injury Assessment  Goal: Absence of physical injury  7/17/2024 2339 by Malorie Hua RN  Outcome: Progressing  7/17/2024 1033 by Angelina Wynne RN  Outcome: Progressing     Problem: Nutrition Deficit:  Goal: Optimize nutritional status  7/17/2024 2339 by Malorie Hua RN  Outcome: Progressing  7/17/2024 1033 by Angelina Wynne RN  Outcome: Progressing     Problem: Pain  Goal: Verbalizes/displays adequate comfort level or baseline comfort level  7/17/2024 2339 by Malorie Hua RN  Outcome: Progressing  7/17/2024 1033 by Angelina Wynne RN  Outcome: Progressing

## 2024-07-18 NOTE — PROGRESS NOTES
Nutrition Assessment     Type and Reason for Visit: Reassess    Malnutrition Assessment:  Malnutrition Status: At risk for malnutrition (Comment)    Nutrition Assessment:  PO intake now avg >75% of meals. NO new wt available. Consider pt to be at low risk for nutritional decline at this time. Will continue to follow.    Estimated Daily Nutrient Needs:  Energy (kcal):  1314-6320 (15-18kcal/kg) Weight Used for Energy Requirements: Current     Protein (g):   (1.2-2.0g/kg) Weight Used for Protein Requirements: Ideal        Fluid (ml/day):  3726-1492 Method Used for Fluid Requirements: 1 ml/kcal    Nutrition Related Findings:   +1 BLE edema, BM 7/15 Wound Type: Open Wounds (buttocks)    Current Nutrition Therapies:    ADULT DIET; Regular; No scrambled eggs  ADULT ORAL NUTRITION SUPPLEMENT; Breakfast, Dinner; Low Calorie/High Protein Oral Supplement    Anthropometric Measures:  Height: 167.6 cm (5' 6\")  Current Body Wt: 89.4 kg (197 lb)   BMI: 31.8    Nutrition Diagnosis:   Increased nutrient needs related to increase demand for energy/nutrients as evidenced by wounds    Nutrition Interventions:   Food and/or Nutrient Delivery: Continue Current Diet, Continue Oral Nutrition Supplement  Nutrition Education/Counseling: No recommendation at this time  Coordination of Nutrition Care: Continue to monitor while inpatient       Goals:  Previous Goal Met: Goal(s) Achieved  Goals: PO intake 50% or greater       Nutrition Monitoring and Evaluation:   Behavioral-Environmental Outcomes: None Identified  Food/Nutrient Intake Outcomes: Food and Nutrient Intake, Supplement Intake  Physical Signs/Symptoms Outcomes: Biochemical Data, Nutrition Focused Physical Findings, Skin, Weight    Discharge Planning:    No discharge needs at this time     Carrie Montoya, MS, RD, LD, [facility]  Contact: [phone]

## 2024-07-18 NOTE — PROGRESS NOTES
Facility/Department: [facility] 8 REHAB UNIT  Occupational Therapy     Name: Kelsey Morejon  : 1955  MRN: [id number]  Date of Service: 2024    Discharge Recommendations:  Continue to assess pending progress    Patient Diagnosis(es): There were no encounter diagnoses.  Past Medical History:  has a past medical history of CAD (coronary artery disease), Cellulitis, History of blood transfusion, History of DVT (deep vein thrombosis), History of neutropenia, History of pulmonary fibrosis, Hx of blood clots, Hypercholesterolemia, Hypertension, Intrinsic asthma, Pacemaker, Post op infection, Rheumatoid arteritis (HCC), Sinus node dysfunction (HCC), Staph aureus infection, Status post placement of implantable loop recorder, Syncope, and Thyroid disease.  Past Surgical History:  has a past surgical history that includes Diagnostic Cardiac Cath Lab Procedure; LEEP; Coronary angioplasty with stent (); Pacemaker insertion (2016); back surgery (2018); pr arthrp kne condyle&platu medial&lat compartments (Right, 2018); pr i&d deep absc bursa/hematoma thigh/knee region (Right, 10/23/2018); Cardiac catheterization (14  MDL); Cardiac catheterization (1/26/15  MDL); pacemaker placement; joint replacement; Revision total knee arthroplasty (Right, 2018); and Cholecystectomy (2024).    Treatment Diagnosis: Acute cholecystitis, s/p cholecystectomy    Assessment   Performance deficits / Impairments: Decreased functional mobility ;Decreased ADL status;Decreased strength;Decreased endurance;Decreased balance;Decreased high-level IADLs  Assessment: Pt. completed stand step transfer with RW from bed to w/c, requiring Mod A. Able to stand at RW during clothing management, with Min A. Pt. fatigues quickly, requiring increased assistance when fatigued. Pt. and caregivers would benefit from use of kamlesh maikel within the home to safely complete transfers when the pt. is fatigued. Pt. would benefit from

## 2024-07-18 NOTE — PROGRESS NOTES
Occupational Therapy  Facility/Department: [facility] 8 REHAB UNIT  Rehabilitation Occupational Therapy Daily Treatment Note    Date: 24  Patient Name: Kelsey Morejon       Room: 0820/820-02  MRN: [id number]  Account: [id number]   : 1955  (69 y.o.) Gender: female        Diagnosis: Acute cholecystitis, s/p cholecystectomy       Treatment Diagnosis: Acute cholecystitis, s/p cholecystectomy   Past Medical History:  has a past medical history of CAD (coronary artery disease), Cellulitis, History of blood transfusion, History of DVT (deep vein thrombosis), History of neutropenia, History of pulmonary fibrosis, Hx of blood clots, Hypercholesterolemia, Hypertension, Intrinsic asthma, Pacemaker, Post op infection, Rheumatoid arteritis (HCC), Sinus node dysfunction (HCC), Staph aureus infection, Status post placement of implantable loop recorder, Syncope, and Thyroid disease.  Past Surgical History:   has a past surgical history that includes Diagnostic Cardiac Cath Lab Procedure; LEEP; Coronary angioplasty with stent (); Pacemaker insertion (2016); back surgery (2018); pr arthrp kne condyle&platu medial&lat compartments (Right, 2018); pr i&d deep absc bursa/hematoma thigh/knee region (Right, 10/23/2018); Cardiac catheterization (14  MDL); Cardiac catheterization (1/26/15  MDL); pacemaker placement; joint replacement; Revision total knee arthroplasty (Right, 2018); and Cholecystectomy (2024).    Restrictions  Restrictions/Precautions: Fall Risk, Isolation    Subjective     24 1345   General   Family / Caregiver Present Yes   Diagnosis Acute cholecystitis, s/p cholecystectomy   Pain Screening   Pain at present 0   Scale Used Numeric Score     Objective     24 1345   Balance   Sitting Balance Supervision   Functional Mobility   Functional - Mobility Device Other  (PWC)   Activity To/From therapy gym   Assist Level Supervision      24 1345   Bed mobility   Sit to  Supine Moderate assistance      07/18/24 1345   Transfers   Stand Pivot Transfers Minimal assistance;Maximum assistance;Moderate assistance  (With kamleshjonathan dunawayrufina--pt able to pull to stand with bar with min A; from PWC to EOB)   Sit to stand Minimal assistance   Stand to sit Minimal assistance      07/18/24 1345   OT Exercises   Exercise Treatment 2# Tbar, 15 reps, 1 set, all planes; 2# FW, 15 reps, 1 set, 2 planes      07/18/24 1345   Putting On/Taking Off Footwear   Assistance Needed Supervision or touching assistance   Comment Able to don/doff socks using AE with cues for technique   CARE Score 4      07/18/24 1345   Safety Devices   Type of Devices Bed alarm in place;Call light within reach;Left in bed     Assessment     07/18/24 1345   Assessment   Performance deficits / Impairments Decreased functional mobility ;Decreased ADL status;Decreased strength;Decreased endurance;Decreased balance;Decreased high-level IADLs   Treatment Diagnosis Acute cholecystitis, s/p cholecystectomy      07/18/24 1345   Activity Tolerance   Activity Tolerance Patient Tolerated treatment well     Patient Education     07/18/24 1345   Education   Education Given To Patient   Education Provided Transfer Training;Mobility Training;Home Exercise Program;ADL Function   Education Method Demonstration;Verbal   Barriers to Learning None   Education Outcome Verbalized understanding;Demonstrated understanding     Plan  Occupational Therapy Plan  Current Treatment Recommendations: Strengthening;Balance training;Functional mobility training;Patient/Caregiver education & training;Equipment evaluation, education, & procurement;Safety education & training;Self-Care / ADL;Home management training        Therapy Time   Individual Concurrent Group Co-treatment   Time In 1345         Time Out 1430         Minutes 45         Timed Code Treatment Minutes: 45 Minutes       Electronically signed by SHABBIR Shepherd on 7/18/2024 at 2:57 PM

## 2024-07-18 NOTE — PROGRESS NOTES
Name: Kelsey Morejon  MRN: [id number]  Date of Service:  7/18/2024 07/18/24 1100   Restrictions/Precautions   Restrictions/Precautions Fall Risk;Isolation   General   Chart Reviewed Yes   Patient assessed for rehabilitation services? Yes   Additional Pertinent Hx RA, pacemaker, CAD, MI, DVT, CHF, neurogenic bladder, HTN, charcot's joint, psoriasis vulgaris, osteoporosis, hypothyroidism, cervical spinal stenosis   Diagnosis Acute cholecystitis   Subjective   Subjective Pt brought into gym, agrees to participate in therapy.   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 Pt does present w/ RLE considerably weaker than LLE during ther-ex. Pt reports she does have w/c accessible van- she will need to practice stand pivoting w/ or w/o RW for BR/general transfers.   Discharge Recommendations Continue to assess pending progress;24 hour supervision or assist;Patient would benefit from continued therapy after discharge   Safety Devices   Type of Devices Patient at risk for falls;Gait belt;Left in chair;Chair alarm in place;Call light within reach           Electronically signed by Anette Huitron PTA on 7/18/2024 at 3:33 PM

## 2024-07-19 LAB
ALBUMIN SERPL-MCNC: 2.6 G/DL (ref 3.5–5.2)
ALP SERPL-CCNC: 92 U/L (ref 35–104)
ALT SERPL-CCNC: 22 U/L (ref 5–33)
ANION GAP SERPL CALCULATED.3IONS-SCNC: 7 MMOL/L (ref 7–19)
ANISOCYTOSIS BLD QL SMEAR: ABNORMAL
AST SERPL-CCNC: 18 U/L (ref 5–32)
BASOPHILS # BLD: 0.1 K/UL (ref 0–0.2)
BASOPHILS NFR BLD: 1.3 % (ref 0–1)
BILIRUB SERPL-MCNC: 0.3 MG/DL (ref 0.2–1.2)
BUN SERPL-MCNC: 13 MG/DL (ref 8–23)
CALCIUM SERPL-MCNC: 8.3 MG/DL (ref 8.8–10.2)
CHLORIDE SERPL-SCNC: 112 MMOL/L (ref 98–111)
CO2 SERPL-SCNC: 23 MMOL/L (ref 22–29)
CREAT SERPL-MCNC: 0.8 MG/DL (ref 0.5–0.9)
DACRYOCYTES BLD QL SMEAR: ABNORMAL
EOSINOPHIL # BLD: 0.2 K/UL (ref 0–0.6)
EOSINOPHIL NFR BLD: 4.6 % (ref 0–5)
ERYTHROCYTE [DISTWIDTH] IN BLOOD BY AUTOMATED COUNT: 20.7 % (ref 11.5–14.5)
GLUCOSE BLD-MCNC: 142 MG/DL (ref 70–99)
GLUCOSE SERPL-MCNC: 84 MG/DL (ref 74–109)
HCT VFR BLD AUTO: 26.3 % (ref 37–47)
HGB BLD-MCNC: 7.6 G/DL (ref 12–16)
HYPOCHROMIA BLD QL SMEAR: ABNORMAL
IMM GRANULOCYTES # BLD: 0.2 K/UL
LYMPHOCYTES # BLD: 1.7 K/UL (ref 1.1–4.5)
LYMPHOCYTES NFR BLD: 37.1 % (ref 20–40)
MCH RBC QN AUTO: 28.4 PG (ref 27–31)
MCHC RBC AUTO-ENTMCNC: 28.9 G/DL (ref 33–37)
MCV RBC AUTO: 98.1 FL (ref 81–99)
MONOCYTES # BLD: 0.7 K/UL (ref 0–0.9)
MONOCYTES NFR BLD: 14.9 % (ref 0–10)
NEUTROPHILS # BLD: 1.7 K/UL (ref 1.5–7.5)
NEUTS SEG NFR BLD: 37.5 % (ref 50–65)
OVALOCYTES BLD QL SMEAR: ABNORMAL
PERFORMED ON: ABNORMAL
PLATELET # BLD AUTO: 136 K/UL (ref 130–400)
PLATELET SLIDE REVIEW: ADEQUATE
PMV BLD AUTO: 10.1 FL (ref 9.4–12.3)
POIKILOCYTOSIS BLD QL SMEAR: ABNORMAL
POLYCHROMASIA BLD QL SMEAR: ABNORMAL
POTASSIUM SERPL-SCNC: 4.7 MMOL/L (ref 3.5–5)
PROT SERPL-MCNC: 5.4 G/DL (ref 6.6–8.7)
RBC # BLD AUTO: 2.68 M/UL (ref 4.2–5.4)
SCHISTOCYTES BLD QL SMEAR: ABNORMAL
SODIUM SERPL-SCNC: 142 MMOL/L (ref 136–145)
WBC # BLD AUTO: 4.6 K/UL (ref 4.8–10.8)

## 2024-07-19 PROCEDURE — 82962 GLUCOSE BLOOD TEST: CPT

## 2024-07-19 PROCEDURE — 97535 SELF CARE MNGMENT TRAINING: CPT

## 2024-07-19 PROCEDURE — 80053 COMPREHEN METABOLIC PANEL: CPT

## 2024-07-19 PROCEDURE — 1180000000 HC REHAB R&B

## 2024-07-19 PROCEDURE — 85025 COMPLETE CBC W/AUTO DIFF WBC: CPT

## 2024-07-19 PROCEDURE — 6360000002 HC RX W HCPCS: Performed by: INTERNAL MEDICINE

## 2024-07-19 PROCEDURE — 97110 THERAPEUTIC EXERCISES: CPT

## 2024-07-19 PROCEDURE — 6360000002 HC RX W HCPCS: Performed by: PSYCHIATRY & NEUROLOGY

## 2024-07-19 PROCEDURE — 99232 SBSQ HOSP IP/OBS MODERATE 35: CPT | Performed by: PSYCHIATRY & NEUROLOGY

## 2024-07-19 PROCEDURE — 2580000003 HC RX 258: Performed by: INTERNAL MEDICINE

## 2024-07-19 PROCEDURE — 2580000003 HC RX 258: Performed by: PSYCHIATRY & NEUROLOGY

## 2024-07-19 PROCEDURE — 94760 N-INVAS EAR/PLS OXIMETRY 1: CPT

## 2024-07-19 PROCEDURE — 97530 THERAPEUTIC ACTIVITIES: CPT

## 2024-07-19 PROCEDURE — 6370000000 HC RX 637 (ALT 250 FOR IP): Performed by: PSYCHIATRY & NEUROLOGY

## 2024-07-19 RX ADMIN — RUGBY ZINC OXIDE 20%: 20 OINTMENT TOPICAL at 11:22

## 2024-07-19 RX ADMIN — TRAMADOL HYDROCHLORIDE 50 MG: 50 TABLET ORAL at 20:13

## 2024-07-19 RX ADMIN — Medication 1500 MG: at 00:34

## 2024-07-19 RX ADMIN — LEVOTHYROXINE SODIUM 75 MCG: 75 TABLET ORAL at 05:48

## 2024-07-19 RX ADMIN — ALLOPURINOL 100 MG: 100 TABLET ORAL at 11:24

## 2024-07-19 RX ADMIN — ASPIRIN 81 MG: 81 TABLET, COATED ORAL at 11:24

## 2024-07-19 RX ADMIN — PREDNISONE 5 MG: 5 TABLET ORAL at 11:24

## 2024-07-19 RX ADMIN — Medication 1 TABLET: at 11:24

## 2024-07-19 RX ADMIN — DULOXETINE HYDROCHLORIDE 60 MG: 60 CAPSULE, DELAYED RELEASE ORAL at 11:24

## 2024-07-19 RX ADMIN — Medication: at 11:22

## 2024-07-19 RX ADMIN — LOSARTAN POTASSIUM 50 MG: 50 TABLET, FILM COATED ORAL at 11:24

## 2024-07-19 RX ADMIN — APIXABAN 5 MG: 5 TABLET, FILM COATED ORAL at 11:24

## 2024-07-19 RX ADMIN — COLCHICINE 0.6 MG: 0.6 TABLET, FILM COATED ORAL at 11:24

## 2024-07-19 RX ADMIN — MICONAZOLE NITRATE: 2 POWDER TOPICAL at 11:22

## 2024-07-19 RX ADMIN — FERROUS SULFATE TAB 325 MG (65 MG ELEMENTAL FE) 325 MG: 325 (65 FE) TAB at 11:24

## 2024-07-19 RX ADMIN — PANTOPRAZOLE SODIUM 40 MG: 40 TABLET, DELAYED RELEASE ORAL at 11:24

## 2024-07-19 RX ADMIN — METHENAMINE HIPPURATE 1 G: 1000 TABLET ORAL at 17:22

## 2024-07-19 RX ADMIN — Medication 500 MG: at 11:24

## 2024-07-19 RX ADMIN — METHENAMINE HIPPURATE 1 G: 1000 TABLET ORAL at 11:23

## 2024-07-19 RX ADMIN — APIXABAN 5 MG: 5 TABLET, FILM COATED ORAL at 20:09

## 2024-07-19 RX ADMIN — SODIUM CHLORIDE 125 MG: 9 INJECTION, SOLUTION INTRAVENOUS at 11:46

## 2024-07-19 RX ADMIN — FOLIC ACID 1 MG: 1 TABLET ORAL at 11:24

## 2024-07-19 RX ADMIN — LEFLUNOMIDE 20 MG: 20 TABLET ORAL at 20:09

## 2024-07-19 ASSESSMENT — PAIN SCALES - GENERAL: PAINLEVEL_OUTOF10: 3

## 2024-07-19 ASSESSMENT — PAIN - FUNCTIONAL ASSESSMENT: PAIN_FUNCTIONAL_ASSESSMENT: ACTIVITIES ARE NOT PREVENTED

## 2024-07-19 ASSESSMENT — PAIN DESCRIPTION - LOCATION: LOCATION: BACK

## 2024-07-19 ASSESSMENT — PAIN DESCRIPTION - DESCRIPTORS: DESCRIPTORS: ACHING

## 2024-07-19 ASSESSMENT — PAIN DESCRIPTION - ORIENTATION: ORIENTATION: LOWER

## 2024-07-19 NOTE — PROGRESS NOTES
unremarkable  Speech normal without dysarthria  No clear issues with language of fund of knowledge     Cranial Nerve Exam     CN III, IV,VI-EOMI, No nystagmus, conjugate eye movements, no ptosis    CN VII-no facial assymetry       Motor Exam  antigravity throughout upper extremities bilaterally      Tremors- no tremors in hands or head noted     Gait  Not tested     Nursing/pcp notes, imaging,labs and vitals reviewed.     PT,OT and/or speech notes reviewed    Lab Results   Component Value Date    WBC 4.6 (L) 07/19/2024    HGB 7.6 (L) 07/19/2024    HCT 26.3 (L) 07/19/2024    MCV 98.1 07/19/2024     07/19/2024     Lab Results   Component Value Date     07/19/2024    K 4.7 07/19/2024     (H) 07/19/2024    CO2 23 07/19/2024    BUN 13 07/19/2024    CREATININE 0.8 07/19/2024    GLUCOSE 84 07/19/2024    CALCIUM 8.3 (L) 07/19/2024    BILITOT 0.3 07/19/2024    ALKPHOS 92 07/19/2024    AST 18 07/19/2024    ALT 22 07/19/2024    LABGLOM 79 07/19/2024    GFRAA >59 11/01/2021     Lab Results   Component Value Date    INR 1.50 (H) 10/28/2021    INR 1.20 (H) 12/05/2018    INR 1.14 09/04/2018    PROTIME 18.2 (H) 10/28/2021    PROTIME 15.2 (H) 12/05/2018    PROTIME 14.5 09/04/2018               Anette Huitron PTA  Physical Therapist Assistant  Physical Therapy     Progress Notes      Cosign Needed     Date of Service: 7/18/2024  3:33 PM     Cosign Needed            Name: Kelsey Morejon  MRN: [id number]  Date of Service:  7/18/2024 07/18/24 1100   Restrictions/Precautions   Restrictions/Precautions Fall Risk;Isolation   General   Chart Reviewed Yes   Patient assessed for rehabilitation services? Yes   Additional Pertinent Hx RA, pacemaker, CAD, MI, DVT, CHF, neurogenic bladder, HTN, charcot's joint, psoriasis vulgaris, osteoporosis, hypothyroidism, cervical spinal stenosis   Diagnosis Acute cholecystitis   Subjective   Subjective Pt brought into gym, agrees to participate in therapy.   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 Pt does present w/ RLE considerably weaker than LLE during ther-ex. Pt reports she does have w/c accessible van- she will need to practice stand pivoting w/ or w/o RW for BR/general transfers.   Discharge Recommendations Continue to assess pending progress;24 hour supervision or assist;Patient would benefit from continued therapy after discharge   Safety Devices   Type of Devices Patient at risk for falls;Gait belt;Left in chair;Chair alarm in place;Call light within reach               Electronically signed by Anette Huitron PTA on 7/18/2024 at 3:33 PM                        RECORD REVIEW: Previous medical records, medications were reviewed at today's visit    IMPRESSION:   1.  Status post cholecystectomy-monitor  2.  Gout-allopurinol/colchicine  3.  Coronary artery disease-aspirin/Imdur  4.  Hypertension-on meds monitor  5.  Anemia-on iron  6.  Rheumatoid arthritis-Arava/prednisone  7.  Urinary incontinence-Hiprex  8.  Wound left buttock-wound care  9.  GI-PPI/bowel regimen  10.  Pain control-Percocet as needed/tramadol as needed/Lidoderm patch  11.  Hypothyroidism-Synthroid  12.  Myalgia due to statin  13.  PT/OT     Appreciate hospitalist help  UTI-complete antibiotic    Hypotension-drop of systolic blood pressure -Lasix, spironolactone, and Coreg held -fluid bolus given -improved    Wound infection-on vancomycin    IV Ferrlecit daily x 3 days for anemia-stool for occult blood negative on 7/15    Continue current care as noted    DANIELA July 26 th    Expected duration

## 2024-07-19 NOTE — PROGRESS NOTES
Name: Kelsey Morejon  MRN: [id number]  Date of Service:  7/19/2024 07/19/24 1400   Restrictions/Precautions   Restrictions/Precautions Fall Risk;Isolation   General   Chart Reviewed Yes   Patient assessed for rehabilitation services? Yes   Additional Pertinent Hx RA, pacemaker, CAD, MI, DVT, CHF, neurogenic bladder, HTN, charcot's joint, psoriasis vulgaris, osteoporosis, hypothyroidism, cervical spinal stenosis   Diagnosis Acute cholecystitis   Subjective   Subjective Pt laying in bed, agrees to participate in therapy.   Subjective   Subjective General   Pain Faces: 1/10   Transfers   Sit to Stand Minimal Assistance;Contact guard assistance  (In SS, //, and w/c)   Stand to Sit Contact guard assistance  (From SS, //, and recliner)   Bed to Chair Dependent/Total;Minimal assistance;2 Person Assistance  (2X- once via SS from EOB<w/c and once from w/c<recliner)   PT Exercises   Static Standing Balance Exercises 3X standing in // requiring CGA/Min A, for ~15 sec each.   Activity Tolerance   Activity Tolerance Patient tolerated treatment well;Patient limited by endurance   Assessment   Assessment Pt able to perform 3X sit<>stands in // and maintain static standing for ~15 sec each. Pt then able to perform squat/stand pivot transfer w/o AD requiring Min A X 2.   Discharge Recommendations Continue to assess pending progress;24 hour supervision or assist;Patient would benefit from continued therapy after discharge   Safety Devices   Type of Devices Patient at risk for falls;Gait belt;Left in chair;Chair alarm in place;Call light within reach           Electronically signed by Anette Huitron PTA on 7/19/2024 at 4:27 PM

## 2024-07-19 NOTE — PLAN OF CARE
Problem: Discharge Planning  Goal: Discharge to home or other facility with appropriate resources  Outcome: Progressing  Flowsheets (Taken 7/19/2024 1928)  Discharge to home or other facility with appropriate resources: Refer to discharge planning if patient needs post-hospital services based on physician order or complex needs related to functional status, cognitive ability or social support system     Problem: Skin/Tissue Integrity  Goal: Absence of new skin breakdown  Description: 1.  Monitor for areas of redness and/or skin breakdown  2.  Assess vascular access sites hourly  3.  Every 4-6 hours minimum:  Change oxygen saturation probe site  4.  Every 4-6 hours:  If on nasal continuous positive airway pressure, respiratory therapy assess nares and determine need for appliance change or resting period.  Outcome: Progressing     Problem: Safety - Adult  Goal: Free from fall injury  Outcome: Progressing     Problem: ABCDS Injury Assessment  Goal: Absence of physical injury  Outcome: Progressing     Problem: Nutrition Deficit:  Goal: Optimize nutritional status  Outcome: Progressing     Problem: Pain  Goal: Verbalizes/displays adequate comfort level or baseline comfort level  Outcome: Progressing

## 2024-07-19 NOTE — PROGRESS NOTES
Facility/Department: [facility] 8 REHAB UNIT  Occupational Therapy     Name: Kelsey Morejon  : 1955  MRN: [id number]  Date of Service: 2024    Discharge Recommendations:  Continue to assess pending progress    Patient Diagnosis(es): There were no encounter diagnoses.  Past Medical History:  has a past medical history of CAD (coronary artery disease), Cellulitis, History of blood transfusion, History of DVT (deep vein thrombosis), History of neutropenia, History of pulmonary fibrosis, Hx of blood clots, Hypercholesterolemia, Hypertension, Intrinsic asthma, Pacemaker, Post op infection, Rheumatoid arteritis (HCC), Sinus node dysfunction (HCC), Staph aureus infection, Status post placement of implantable loop recorder, Syncope, and Thyroid disease.  Past Surgical History:  has a past surgical history that includes Diagnostic Cardiac Cath Lab Procedure; LEEP; Coronary angioplasty with stent (); Pacemaker insertion (2016); back surgery (2018); pr arthrp kne condyle&platu medial&lat compartments (Right, 2018); pr i&d deep absc bursa/hematoma thigh/knee region (Right, 10/23/2018); Cardiac catheterization (14  MDL); Cardiac catheterization (1/26/15  MDL); pacemaker placement; joint replacement; Revision total knee arthroplasty (Right, 2018); and Cholecystectomy (2024).    Treatment Diagnosis: Acute cholecystitis, s/p cholecystectomy    Assessment   Performance deficits / Impairments: Decreased functional mobility ;Decreased ADL status;Decreased strength;Decreased endurance;Decreased balance;Decreased high-level IADLs  Treatment Diagnosis: Acute cholecystitis, s/p cholecystectomy  Prognosis: Good  Activity Tolerance  Activity Tolerance: Patient Tolerated treatment well         Plan   Occupational Therapy Plan  Specific Instructions for Next Treatment: AE for footwear  Current Treatment Recommendations: Strengthening, Balance training, Functional mobility training, Patient/Caregiver

## 2024-07-19 NOTE — PLAN OF CARE
Problem: Skin/Tissue Integrity  Goal: Absence of new skin breakdown  Description: 1.  Monitor for areas of redness and/or skin breakdown  2.  Assess vascular access sites hourly  3.  Every 4-6 hours minimum:  Change oxygen saturation probe site  4.  Every 4-6 hours:  If on nasal continuous positive airway pressure, respiratory therapy assess nares and determine need for appliance change or resting period.  Outcome: Progressing     Problem: Safety - Adult  Goal: Free from fall injury  Outcome: Progressing     Problem: ABCDS Injury Assessment  Goal: Absence of physical injury  Outcome: Progressing     Problem: Discharge Planning  Goal: Discharge to home or other facility with appropriate resources  Outcome: Progressing     Problem: Nutrition Deficit:  Goal: Optimize nutritional status  Outcome: Progressing  Flowsheets (Taken 7/18/2024 9342 by Carrie Montoya, MS, RD, LD)  Nutrient intake appropriate for improving, restoring, or maintaining nutritional needs:   Recommend appropriate diets, oral nutritional supplements, and vitamin/mineral supplements   Monitor oral intake, labs, and treatment plans

## 2024-07-20 LAB
ALBUMIN SERPL-MCNC: 2.7 G/DL (ref 3.5–5.2)
ALBUMIN SERPL-MCNC: 2.9 G/DL (ref 3.5–5.2)
ALP SERPL-CCNC: 93 U/L (ref 35–104)
ALP SERPL-CCNC: 99 U/L (ref 35–104)
ALT SERPL-CCNC: 23 U/L (ref 5–33)
ALT SERPL-CCNC: 24 U/L (ref 5–33)
ANION GAP SERPL CALCULATED.3IONS-SCNC: 10 MMOL/L (ref 7–19)
ANION GAP SERPL CALCULATED.3IONS-SCNC: 10 MMOL/L (ref 7–19)
AST SERPL-CCNC: 21 U/L (ref 5–32)
AST SERPL-CCNC: 22 U/L (ref 5–32)
BASOPHILS # BLD: 0.1 K/UL (ref 0–0.2)
BASOPHILS NFR BLD: 1.2 % (ref 0–1)
BILIRUB SERPL-MCNC: 0.2 MG/DL (ref 0.2–1.2)
BILIRUB SERPL-MCNC: 0.3 MG/DL (ref 0.2–1.2)
BUN SERPL-MCNC: 13 MG/DL (ref 8–23)
BUN SERPL-MCNC: 13 MG/DL (ref 8–23)
CALCIUM SERPL-MCNC: 8.4 MG/DL (ref 8.8–10.2)
CALCIUM SERPL-MCNC: 8.5 MG/DL (ref 8.8–10.2)
CHLORIDE SERPL-SCNC: 107 MMOL/L (ref 98–111)
CHLORIDE SERPL-SCNC: 109 MMOL/L (ref 98–111)
CO2 SERPL-SCNC: 22 MMOL/L (ref 22–29)
CO2 SERPL-SCNC: 22 MMOL/L (ref 22–29)
CREAT SERPL-MCNC: 0.7 MG/DL (ref 0.5–0.9)
CREAT SERPL-MCNC: 0.7 MG/DL (ref 0.5–0.9)
EOSINOPHIL # BLD: 0.2 K/UL (ref 0–0.6)
EOSINOPHIL NFR BLD: 3 % (ref 0–5)
ERYTHROCYTE [DISTWIDTH] IN BLOOD BY AUTOMATED COUNT: 21 % (ref 11.5–14.5)
GLUCOSE SERPL-MCNC: 109 MG/DL (ref 74–109)
GLUCOSE SERPL-MCNC: 170 MG/DL (ref 74–109)
HCT VFR BLD AUTO: 26.7 % (ref 37–47)
HGB BLD-MCNC: 7.8 G/DL (ref 12–16)
IMM GRANULOCYTES # BLD: 0.2 K/UL
LYMPHOCYTES # BLD: 1.8 K/UL (ref 1.1–4.5)
LYMPHOCYTES NFR BLD: 36.1 % (ref 20–40)
MCH RBC QN AUTO: 28.4 PG (ref 27–31)
MCHC RBC AUTO-ENTMCNC: 29.2 G/DL (ref 33–37)
MCV RBC AUTO: 97.1 FL (ref 81–99)
MONOCYTES # BLD: 0.7 K/UL (ref 0–0.9)
MONOCYTES NFR BLD: 14 % (ref 0–10)
NEUTROPHILS # BLD: 2 K/UL (ref 1.5–7.5)
NEUTS SEG NFR BLD: 41.4 % (ref 50–65)
PLATELET # BLD AUTO: 155 K/UL (ref 130–400)
PMV BLD AUTO: 11.4 FL (ref 9.4–12.3)
POTASSIUM SERPL-SCNC: 5 MMOL/L (ref 3.5–5)
POTASSIUM SERPL-SCNC: 5.2 MMOL/L (ref 3.5–5)
PROT SERPL-MCNC: 5.6 G/DL (ref 6.6–8.7)
PROT SERPL-MCNC: 5.7 G/DL (ref 6.6–8.7)
RBC # BLD AUTO: 2.75 M/UL (ref 4.2–5.4)
SODIUM SERPL-SCNC: 139 MMOL/L (ref 136–145)
SODIUM SERPL-SCNC: 141 MMOL/L (ref 136–145)
VANCOMYCIN TROUGH SERPL-MCNC: 15.2 UG/ML (ref 10–20)
WBC # BLD AUTO: 4.9 K/UL (ref 4.8–10.8)

## 2024-07-20 PROCEDURE — 6360000002 HC RX W HCPCS: Performed by: PSYCHIATRY & NEUROLOGY

## 2024-07-20 PROCEDURE — 85025 COMPLETE CBC W/AUTO DIFF WBC: CPT

## 2024-07-20 PROCEDURE — 2580000003 HC RX 258: Performed by: PSYCHIATRY & NEUROLOGY

## 2024-07-20 PROCEDURE — 1180000000 HC REHAB R&B

## 2024-07-20 PROCEDURE — 6360000002 HC RX W HCPCS: Performed by: INTERNAL MEDICINE

## 2024-07-20 PROCEDURE — 94760 N-INVAS EAR/PLS OXIMETRY 1: CPT

## 2024-07-20 PROCEDURE — 80053 COMPREHEN METABOLIC PANEL: CPT

## 2024-07-20 PROCEDURE — 80202 ASSAY OF VANCOMYCIN: CPT

## 2024-07-20 PROCEDURE — 99232 SBSQ HOSP IP/OBS MODERATE 35: CPT | Performed by: PSYCHIATRY & NEUROLOGY

## 2024-07-20 PROCEDURE — 6370000000 HC RX 637 (ALT 250 FOR IP): Performed by: PSYCHIATRY & NEUROLOGY

## 2024-07-20 RX ADMIN — DULOXETINE HYDROCHLORIDE 60 MG: 60 CAPSULE, DELAYED RELEASE ORAL at 07:51

## 2024-07-20 RX ADMIN — APIXABAN 5 MG: 5 TABLET, FILM COATED ORAL at 07:51

## 2024-07-20 RX ADMIN — COLCHICINE 0.6 MG: 0.6 TABLET, FILM COATED ORAL at 07:51

## 2024-07-20 RX ADMIN — LOSARTAN POTASSIUM 50 MG: 50 TABLET, FILM COATED ORAL at 07:51

## 2024-07-20 RX ADMIN — ALLOPURINOL 100 MG: 100 TABLET ORAL at 07:51

## 2024-07-20 RX ADMIN — APIXABAN 5 MG: 5 TABLET, FILM COATED ORAL at 20:50

## 2024-07-20 RX ADMIN — Medication 1 TABLET: at 07:51

## 2024-07-20 RX ADMIN — Medication 1500 MG: at 01:15

## 2024-07-20 RX ADMIN — TRAMADOL HYDROCHLORIDE 50 MG: 50 TABLET ORAL at 20:50

## 2024-07-20 RX ADMIN — ASPIRIN 81 MG: 81 TABLET, COATED ORAL at 07:51

## 2024-07-20 RX ADMIN — Medication: at 07:49

## 2024-07-20 RX ADMIN — FOLIC ACID 1 MG: 1 TABLET ORAL at 07:51

## 2024-07-20 RX ADMIN — FERROUS SULFATE TAB 325 MG (65 MG ELEMENTAL FE) 325 MG: 325 (65 FE) TAB at 07:52

## 2024-07-20 RX ADMIN — METHENAMINE HIPPURATE 1 G: 1000 TABLET ORAL at 16:56

## 2024-07-20 RX ADMIN — PREDNISONE 5 MG: 5 TABLET ORAL at 07:51

## 2024-07-20 RX ADMIN — Medication 500 MG: at 07:51

## 2024-07-20 RX ADMIN — PANTOPRAZOLE SODIUM 40 MG: 40 TABLET, DELAYED RELEASE ORAL at 07:51

## 2024-07-20 RX ADMIN — LEVOTHYROXINE SODIUM 75 MCG: 75 TABLET ORAL at 06:16

## 2024-07-20 RX ADMIN — METHENAMINE HIPPURATE 1 G: 1000 TABLET ORAL at 07:50

## 2024-07-20 RX ADMIN — SODIUM CHLORIDE 125 MG: 9 INJECTION, SOLUTION INTRAVENOUS at 08:31

## 2024-07-20 RX ADMIN — RUGBY ZINC OXIDE 20%: 20 OINTMENT TOPICAL at 07:50

## 2024-07-20 RX ADMIN — LEFLUNOMIDE 20 MG: 20 TABLET ORAL at 20:50

## 2024-07-20 ASSESSMENT — PAIN DESCRIPTION - ORIENTATION: ORIENTATION: POSTERIOR

## 2024-07-20 ASSESSMENT — PAIN DESCRIPTION - LOCATION: LOCATION: NECK

## 2024-07-20 ASSESSMENT — PAIN SCALES - GENERAL: PAINLEVEL_OUTOF10: 3

## 2024-07-20 ASSESSMENT — PAIN - FUNCTIONAL ASSESSMENT: PAIN_FUNCTIONAL_ASSESSMENT: ACTIVITIES ARE NOT PREVENTED

## 2024-07-20 ASSESSMENT — PAIN DESCRIPTION - DESCRIPTORS: DESCRIPTORS: DULL;ACHING

## 2024-07-20 NOTE — PROGRESS NOTES
[facility]   Pharmacy Pharmacokinetic Monitoring Service - Vancomycin    Consulting Provider: Dr Richter   Indication: Skin/Soft Tissue Infection  Target Concentration: Goal AUC/TEMI 400-600 mg*hr/L  Day of Therapy: 6  Additional Antimicrobials: None    Pertinent Laboratory Values:   Wt Readings from Last 1 Encounters:   07/13/24 89.4 kg (197 lb)     Temp Readings from Last 1 Encounters:   07/19/24 98.1 °F (36.7 °C) (Temporal)     Estimated Creatinine Clearance: 83 mL/min (based on SCr of 0.7 mg/dL).  Recent Labs     07/19/24  0400 07/20/24  0015   CREATININE 0.8 0.7   BUN 13 13   WBC 4.6* 4.9     Procalcitonin: N/A    Pertinent Cultures:  Culture Date Source Results   07/13/24  07/10/24 Wound/Abscess  Urine  MRSA  Enterobacter cloacae complex  C albicans   MRSA Nasal Swab: N/A. Non-respiratory infection.    Recent vancomycin administrations                     vancomycin (VANCOCIN) 1500 mg in sodium chloride 0.9 % 250 mL IVPB (mg) 1,500 mg New Bag 07/19/24 0034     1,500 mg New Bag 07/18/24 0025    vancomycin (VANCOCIN) 1000 mg in sodium chloride 0.9% 250 mL IVPB (mg) 1,000 mg New Bag 07/17/24 0503                    Assessment:  Date/Time Current Dose Concentration Timing of Concentration (h) AUC   07/20/24 1500mg Q24hr 15.2 23hr 41min 561   Note: Serum concentrations collected for AUC dosing may appear elevated if collected in close proximity to the dose administered, this is not necessarily an indication of toxicity    Plan:  Current dosing regimen is therapeutic  Continue current dose x2 more doses to complete therapy  Pharmacy will continue to monitor patient and adjust therapy as indicated    Thank you for the consult,  Yrn Pérez RPH  7/20/2024 12:54 AM

## 2024-07-20 NOTE — PLAN OF CARE
Problem: Discharge Planning  Goal: Discharge to home or other facility with appropriate resources  7/20/2024 0804 by Marycarmen Mott RN  Outcome: Progressing  Flowsheets (Taken 7/20/2024 0756)  Discharge to home or other facility with appropriate resources: Refer to discharge planning if patient needs post-hospital services based on physician order or complex needs related to functional status, cognitive ability or social support system  7/20/2024 0219 by Maryan Hemphill LPN  Outcome: Progressing  Flowsheets (Taken 7/19/2024 2010)  Discharge to home or other facility with appropriate resources: Refer to discharge planning if patient needs post-hospital services based on physician order or complex needs related to functional status, cognitive ability or social support system     Problem: Skin/Tissue Integrity  Goal: Absence of new skin breakdown  Description: 1.  Monitor for areas of redness and/or skin breakdown  2.  Assess vascular access sites hourly  3.  Every 4-6 hours minimum:  Change oxygen saturation probe site  4.  Every 4-6 hours:  If on nasal continuous positive airway pressure, respiratory therapy assess nares and determine need for appliance change or resting period.  7/20/2024 0804 by Marycarmen Mott RN  Outcome: Progressing  7/20/2024 0219 by Maryan Hemphill LPN  Outcome: Progressing     Problem: Safety - Adult  Goal: Free from fall injury  7/20/2024 0804 by Marycarmen Mott RN  Outcome: Progressing  7/20/2024 0219 by Maryan Hemphill LPN  Outcome: Progressing     Problem: ABCDS Injury Assessment  Goal: Absence of physical injury  7/20/2024 0804 by Marycarmen Mott RN  Outcome: Progressing  7/20/2024 0219 by Maryan Hemphill LPN  Outcome: Progressing     Problem: Nutrition Deficit:  Goal: Optimize nutritional status  7/20/2024 0804 by Marycarmen Mott RN  Outcome: Progressing  7/20/2024 0219 by Maryan Hemphill LPN  Outcome: Progressing     Problem: Pain  Goal: Verbalizes/displays adequate

## 2024-07-20 NOTE — PROGRESS NOTES
Patient:   Kelsey Morejon  MR#:    [id number]   Room:    [facility] [place][note 2]820-   YOB: 1955  Date of Progress Note: 7/19/2024  Time of Note                           7:15 AM  Consulting Physician:   Guy Stubbs M.D.  Attending Physician:  Guy Stubbs MD     Chief complaint Status post cholecystectomy     S:This 69 y.o. 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 Patient admitted on 6/13/2024 with AMS. She was found to have UTI  and left ureteral calculus with obstruction and acute hypoxic respiratory failure. She has been treated with Cefepime. Lithotripsy performed on 6/21/2024. GI was consulted on 6/21/2024 for abdominal pain. GB US showed cholelithiasis with small stones and sludge. HIDA scan done showed Non visualization of the gallbladder and could not rule out cystic duct obstruction.  On 07/03/24 she underwent a CHOLECYSTECTOMY LAPAROSCOPIC WITH INTRAOPERATIVE CHOLANGIOGRAM by   Lexa Waddell MD. She tolerated procedure well.   She does have a wound on her R buttock from cyst removal by Dr. Waddell. The wound has opened up and has been dressed w/ wet-to-dry dressings by Home Health. Wound measures 1.2cm x 2 cm x 2.5. Undermining circumferentially reaching 5cm at 4 o'clock. Subcutaneous tissue, wound bed. Small amount of serosanguineous drainage. Aixa wound area is slight erythema but blanchable.  She was complaining about some difficulty swallowing. Speech evaluated her. She is now tolerating a regular diet, thin liquids with meds whole in applesauce.   She is now medically stable and is participating with therapy. She is ready to begin rehab with goal of returning home after rehab dc with support from her caregivers and family.  Previously syncope after large bowel movement.  Was hypotensive.  Noted to have urinary tract infection.  Fluid bolus was given and medications were adjusted.

## 2024-07-20 NOTE — PLAN OF CARE
Problem: Discharge Planning  Goal: Discharge to home or other facility with appropriate resources  7/20/2024 0219 by Mrayan Hemphill LPN  Outcome: Progressing  Flowsheets (Taken 7/19/2024 2010)  Discharge to home or other facility with appropriate resources: Refer to discharge planning if patient needs post-hospital services based on physician order or complex needs related to functional status, cognitive ability or social support system  7/19/2024 1414 by Marycarmen Mott RN  Outcome: Progressing  Flowsheets (Taken 7/19/2024 0950)  Discharge to home or other facility with appropriate resources: Refer to discharge planning if patient needs post-hospital services based on physician order or complex needs related to functional status, cognitive ability or social support system     Problem: Skin/Tissue Integrity  Goal: Absence of new skin breakdown  Description: 1.  Monitor for areas of redness and/or skin breakdown  2.  Assess vascular access sites hourly  3.  Every 4-6 hours minimum:  Change oxygen saturation probe site  4.  Every 4-6 hours:  If on nasal continuous positive airway pressure, respiratory therapy assess nares and determine need for appliance change or resting period.  7/20/2024 0219 by Maryan Hemphill LPN  Outcome: Progressing  7/19/2024 1414 by Marycarmen Mott RN  Outcome: Progressing     Problem: Safety - Adult  Goal: Free from fall injury  7/20/2024 0219 by Maryan Hemphill LPN  Outcome: Progressing  7/19/2024 1414 by Marycarmen Mott RN  Outcome: Progressing     Problem: ABCDS Injury Assessment  Goal: Absence of physical injury  7/20/2024 0219 by Maryan Hemphill LPN  Outcome: Progressing  7/19/2024 1414 by Marycarmen Mott RN  Outcome: Progressing     Problem: Nutrition Deficit:  Goal: Optimize nutritional status  7/20/2024 0219 by Maryan Hemphill LPN  Outcome: Progressing  7/19/2024 1414 by Marycarmen Mott RN  Outcome: Progressing     Problem: Pain  Goal: Verbalizes/displays adequate

## 2024-07-21 LAB
ALBUMIN SERPL-MCNC: 2.7 G/DL (ref 3.5–5.2)
ALP SERPL-CCNC: 89 U/L (ref 35–104)
ALT SERPL-CCNC: 23 U/L (ref 5–33)
ANION GAP SERPL CALCULATED.3IONS-SCNC: 9 MMOL/L (ref 7–19)
AST SERPL-CCNC: 22 U/L (ref 5–32)
BASOPHILS # BLD: 0.1 K/UL (ref 0–0.2)
BASOPHILS NFR BLD: 1.1 % (ref 0–1)
BILIRUB SERPL-MCNC: 0.4 MG/DL (ref 0.2–1.2)
BUN SERPL-MCNC: 13 MG/DL (ref 8–23)
CALCIUM SERPL-MCNC: 8.6 MG/DL (ref 8.8–10.2)
CHLORIDE SERPL-SCNC: 110 MMOL/L (ref 98–111)
CO2 SERPL-SCNC: 23 MMOL/L (ref 22–29)
CREAT SERPL-MCNC: 0.6 MG/DL (ref 0.5–0.9)
EOSINOPHIL # BLD: 0.3 K/UL (ref 0–0.6)
EOSINOPHIL NFR BLD: 5.5 % (ref 0–5)
ERYTHROCYTE [DISTWIDTH] IN BLOOD BY AUTOMATED COUNT: 21.2 % (ref 11.5–14.5)
GLUCOSE SERPL-MCNC: 74 MG/DL (ref 74–109)
HCT VFR BLD AUTO: 27.4 % (ref 37–47)
HGB BLD-MCNC: 8.1 G/DL (ref 12–16)
IMM GRANULOCYTES # BLD: 0.2 K/UL
LYMPHOCYTES # BLD: 2.3 K/UL (ref 1.1–4.5)
LYMPHOCYTES NFR BLD: 40.1 % (ref 20–40)
MCH RBC QN AUTO: 28.7 PG (ref 27–31)
MCHC RBC AUTO-ENTMCNC: 29.6 G/DL (ref 33–37)
MCV RBC AUTO: 97.2 FL (ref 81–99)
MONOCYTES # BLD: 0.8 K/UL (ref 0–0.9)
MONOCYTES NFR BLD: 14.6 % (ref 0–10)
NEUTROPHILS # BLD: 2 K/UL (ref 1.5–7.5)
NEUTS SEG NFR BLD: 35.5 % (ref 50–65)
PLATELET # BLD AUTO: 163 K/UL (ref 130–400)
PMV BLD AUTO: 12.4 FL (ref 9.4–12.3)
POTASSIUM SERPL-SCNC: 4.5 MMOL/L (ref 3.5–5)
PROT SERPL-MCNC: 5.4 G/DL (ref 6.6–8.7)
RBC # BLD AUTO: 2.82 M/UL (ref 4.2–5.4)
SODIUM SERPL-SCNC: 142 MMOL/L (ref 136–145)
WBC # BLD AUTO: 5.6 K/UL (ref 4.8–10.8)

## 2024-07-21 PROCEDURE — 80053 COMPREHEN METABOLIC PANEL: CPT

## 2024-07-21 PROCEDURE — 6370000000 HC RX 637 (ALT 250 FOR IP): Performed by: PSYCHIATRY & NEUROLOGY

## 2024-07-21 PROCEDURE — 99232 SBSQ HOSP IP/OBS MODERATE 35: CPT | Performed by: PSYCHIATRY & NEUROLOGY

## 2024-07-21 PROCEDURE — 2580000003 HC RX 258: Performed by: INTERNAL MEDICINE

## 2024-07-21 PROCEDURE — 6360000002 HC RX W HCPCS: Performed by: INTERNAL MEDICINE

## 2024-07-21 PROCEDURE — 36415 COLL VENOUS BLD VENIPUNCTURE: CPT

## 2024-07-21 PROCEDURE — 1180000000 HC REHAB R&B

## 2024-07-21 PROCEDURE — 85025 COMPLETE CBC W/AUTO DIFF WBC: CPT

## 2024-07-21 RX ADMIN — METHENAMINE HIPPURATE 1 G: 1000 TABLET ORAL at 07:59

## 2024-07-21 RX ADMIN — Medication 1500 MG: at 00:32

## 2024-07-21 RX ADMIN — FERROUS SULFATE TAB 325 MG (65 MG ELEMENTAL FE) 325 MG: 325 (65 FE) TAB at 07:58

## 2024-07-21 RX ADMIN — MICONAZOLE NITRATE: 2 POWDER TOPICAL at 20:57

## 2024-07-21 RX ADMIN — FOLIC ACID 1 MG: 1 TABLET ORAL at 07:58

## 2024-07-21 RX ADMIN — ASPIRIN 81 MG: 81 TABLET, COATED ORAL at 07:58

## 2024-07-21 RX ADMIN — TRAMADOL HYDROCHLORIDE 50 MG: 50 TABLET ORAL at 20:55

## 2024-07-21 RX ADMIN — LEVOTHYROXINE SODIUM 75 MCG: 75 TABLET ORAL at 05:53

## 2024-07-21 RX ADMIN — DULOXETINE HYDROCHLORIDE 60 MG: 60 CAPSULE, DELAYED RELEASE ORAL at 07:58

## 2024-07-21 RX ADMIN — PREDNISONE 5 MG: 5 TABLET ORAL at 07:58

## 2024-07-21 RX ADMIN — ALLOPURINOL 100 MG: 100 TABLET ORAL at 07:58

## 2024-07-21 RX ADMIN — METHENAMINE HIPPURATE 1 G: 1000 TABLET ORAL at 16:46

## 2024-07-21 RX ADMIN — PANTOPRAZOLE SODIUM 40 MG: 40 TABLET, DELAYED RELEASE ORAL at 07:58

## 2024-07-21 RX ADMIN — LEFLUNOMIDE 20 MG: 20 TABLET ORAL at 20:56

## 2024-07-21 RX ADMIN — APIXABAN 5 MG: 5 TABLET, FILM COATED ORAL at 20:56

## 2024-07-21 RX ADMIN — Medication 1 TABLET: at 07:58

## 2024-07-21 RX ADMIN — COLCHICINE 0.6 MG: 0.6 TABLET, FILM COATED ORAL at 07:59

## 2024-07-21 RX ADMIN — Medication 500 MG: at 07:59

## 2024-07-21 RX ADMIN — LOSARTAN POTASSIUM 50 MG: 50 TABLET, FILM COATED ORAL at 07:59

## 2024-07-21 RX ADMIN — APIXABAN 5 MG: 5 TABLET, FILM COATED ORAL at 07:58

## 2024-07-21 ASSESSMENT — PAIN SCALES - GENERAL
PAINLEVEL_OUTOF10: 4
PAINLEVEL_OUTOF10: 2

## 2024-07-21 ASSESSMENT — PAIN DESCRIPTION - DESCRIPTORS: DESCRIPTORS: ACHING;DISCOMFORT

## 2024-07-21 ASSESSMENT — PAIN DESCRIPTION - LOCATION: LOCATION: ABDOMEN

## 2024-07-21 ASSESSMENT — PAIN - FUNCTIONAL ASSESSMENT: PAIN_FUNCTIONAL_ASSESSMENT: ACTIVITIES ARE NOT PREVENTED

## 2024-07-21 ASSESSMENT — PAIN DESCRIPTION - ORIENTATION: ORIENTATION: ANTERIOR

## 2024-07-21 NOTE — PLAN OF CARE
Problem: Discharge Planning  Goal: Discharge to home or other facility with appropriate resources  7/21/2024 1311 by Myriam Evans RN  Outcome: Progressing  Flowsheets (Taken 7/21/2024 1256)  Discharge to home or other facility with appropriate resources: Refer to discharge planning if patient needs post-hospital services based on physician order or complex needs related to functional status, cognitive ability or social support system  7/21/2024 0048 by Maryan Hemphill LPN  Outcome: Progressing  7/21/2024 0048 by Maryan Hemphill LPN  Outcome: Progressing  Flowsheets (Taken 7/20/2024 2050)  Discharge to home or other facility with appropriate resources: Refer to discharge planning if patient needs post-hospital services based on physician order or complex needs related to functional status, cognitive ability or social support system     Problem: Skin/Tissue Integrity  Goal: Absence of new skin breakdown  Description: 1.  Monitor for areas of redness and/or skin breakdown  2.  Assess vascular access sites hourly  3.  Every 4-6 hours minimum:  Change oxygen saturation probe site  4.  Every 4-6 hours:  If on nasal continuous positive airway pressure, respiratory therapy assess nares and determine need for appliance change or resting period.  7/21/2024 1311 by Myriam Evans RN  Outcome: Progressing  7/21/2024 0048 by Maryan Hemphill LPN  Outcome: Progressing  7/21/2024 0048 by Maryan Hemphill LPN  Outcome: Progressing     Problem: Safety - Adult  Goal: Free from fall injury  7/21/2024 1311 by Myriam Evans RN  Outcome: Progressing  7/21/2024 0048 by Maryan Hemphill LPN  Outcome: Progressing  7/21/2024 0048 by Maryan Hemphill LPN  Outcome: Progressing     Problem: ABCDS Injury Assessment  Goal: Absence of physical injury  7/21/2024 1311 by Myriam Evans RN  Outcome: Progressing  7/21/2024 0048 by Maryan Hemphill LPN  Outcome: Progressing  7/21/2024 0048 by Maryan Hemphill LPN  Outcome:  Progressing     Problem: Nutrition Deficit:  Goal: Optimize nutritional status  7/21/2024 1311 by Myriam Evans RN  Outcome: Progressing  7/21/2024 0048 by Maryan Hemphill LPN  Outcome: Progressing  7/21/2024 0048 by Maryan Hemphill LPN  Outcome: Progressing     Problem: Pain  Goal: Verbalizes/displays adequate comfort level or baseline comfort level  7/21/2024 1311 by Myriam Evans RN  Outcome: Progressing  7/21/2024 0048 by Maryan Hemphill LPN  Outcome: Progressing  7/21/2024 0048 by Maryan Hemphill LPN  Outcome: Progressing

## 2024-07-21 NOTE — PLAN OF CARE
Problem: Discharge Planning  Goal: Discharge to home or other facility with appropriate resources  7/21/2024 0048 by Maryan Hemphill LPN  Outcome: Progressing  7/21/2024 0048 by Maryan Hemphill LPN  Outcome: Progressing  Flowsheets (Taken 7/20/2024 2050)  Discharge to home or other facility with appropriate resources: Refer to discharge planning if patient needs post-hospital services based on physician order or complex needs related to functional status, cognitive ability or social support system     Problem: Skin/Tissue Integrity  Goal: Absence of new skin breakdown  Description: 1.  Monitor for areas of redness and/or skin breakdown  2.  Assess vascular access sites hourly  3.  Every 4-6 hours minimum:  Change oxygen saturation probe site  4.  Every 4-6 hours:  If on nasal continuous positive airway pressure, respiratory therapy assess nares and determine need for appliance change or resting period.  7/21/2024 0048 by Maryan Hemphill LPN  Outcome: Progressing  7/21/2024 0048 by Maryan Hemphill LPN  Outcome: Progressing     Problem: Safety - Adult  Goal: Free from fall injury  7/21/2024 0048 by Maryan Hemphill LPN  Outcome: Progressing  7/21/2024 0048 by Maryan Hemphill LPN  Outcome: Progressing     Problem: ABCDS Injury Assessment  Goal: Absence of physical injury  7/21/2024 0048 by Maryan Hemphill LPN  Outcome: Progressing  7/21/2024 0048 by Maryan Hemphill LPN  Outcome: Progressing     Problem: Nutrition Deficit:  Goal: Optimize nutritional status  7/21/2024 0048 by Maryan Hemphill LPN  Outcome: Progressing  7/21/2024 0048 by Maryan Hemphill LPN  Outcome: Progressing     Problem: Pain  Goal: Verbalizes/displays adequate comfort level or baseline comfort level  7/21/2024 0048 by Maryan Hemphill LPN  Outcome: Progressing  7/21/2024 0048 by Maryan Hemphill LPN  Outcome: Progressing

## 2024-07-21 NOTE — PLAN OF CARE
Problem: Discharge Planning  Goal: Discharge to home or other facility with appropriate resources  Outcome: Progressing  Flowsheets (Taken 7/20/2024 2050)  Discharge to home or other facility with appropriate resources: Refer to discharge planning if patient needs post-hospital services based on physician order or complex needs related to functional status, cognitive ability or social support system     Problem: Skin/Tissue Integrity  Goal: Absence of new skin breakdown  Description: 1.  Monitor for areas of redness and/or skin breakdown  2.  Assess vascular access sites hourly  3.  Every 4-6 hours minimum:  Change oxygen saturation probe site  4.  Every 4-6 hours:  If on nasal continuous positive airway pressure, respiratory therapy assess nares and determine need for appliance change or resting period.  Outcome: Progressing     Problem: Safety - Adult  Goal: Free from fall injury  Outcome: Progressing     Problem: ABCDS Injury Assessment  Goal: Absence of physical injury  Outcome: Progressing     Problem: Nutrition Deficit:  Goal: Optimize nutritional status  Outcome: Progressing     Problem: Pain  Goal: Verbalizes/displays adequate comfort level or baseline comfort level  Outcome: Progressing

## 2024-07-22 LAB
ALBUMIN SERPL-MCNC: 2.7 G/DL (ref 3.5–5.2)
ALP SERPL-CCNC: 84 U/L (ref 35–104)
ALT SERPL-CCNC: 22 U/L (ref 5–33)
ANION GAP SERPL CALCULATED.3IONS-SCNC: 11 MMOL/L (ref 7–19)
AST SERPL-CCNC: 23 U/L (ref 5–32)
BASOPHILS # BLD: 0.1 K/UL (ref 0–0.2)
BASOPHILS NFR BLD: 1.7 % (ref 0–1)
BILIRUB SERPL-MCNC: 0.3 MG/DL (ref 0.2–1.2)
BUN SERPL-MCNC: 13 MG/DL (ref 8–23)
CALCIUM SERPL-MCNC: 8.5 MG/DL (ref 8.8–10.2)
CHLORIDE SERPL-SCNC: 110 MMOL/L (ref 98–111)
CO2 SERPL-SCNC: 22 MMOL/L (ref 22–29)
CREAT SERPL-MCNC: 0.7 MG/DL (ref 0.5–0.9)
EOSINOPHIL # BLD: 0.3 K/UL (ref 0–0.6)
EOSINOPHIL NFR BLD: 6 % (ref 0–5)
ERYTHROCYTE [DISTWIDTH] IN BLOOD BY AUTOMATED COUNT: 21.1 % (ref 11.5–14.5)
GLUCOSE SERPL-MCNC: 80 MG/DL (ref 74–109)
HCT VFR BLD AUTO: 27.3 % (ref 37–47)
HGB BLD-MCNC: 8 G/DL (ref 12–16)
IMM GRANULOCYTES # BLD: 0.2 K/UL
LYMPHOCYTES # BLD: 2.1 K/UL (ref 1.1–4.5)
LYMPHOCYTES NFR BLD: 38.9 % (ref 20–40)
MCH RBC QN AUTO: 28.7 PG (ref 27–31)
MCHC RBC AUTO-ENTMCNC: 29.3 G/DL (ref 33–37)
MCV RBC AUTO: 97.8 FL (ref 81–99)
MONOCYTES # BLD: 0.8 K/UL (ref 0–0.9)
MONOCYTES NFR BLD: 14.3 % (ref 0–10)
NEUTROPHILS # BLD: 1.9 K/UL (ref 1.5–7.5)
NEUTS SEG NFR BLD: 35.9 % (ref 50–65)
PLATELET # BLD AUTO: 162 K/UL (ref 130–400)
PMV BLD AUTO: 12.6 FL (ref 9.4–12.3)
POTASSIUM SERPL-SCNC: 4.2 MMOL/L (ref 3.5–5)
PROT SERPL-MCNC: 5.7 G/DL (ref 6.6–8.7)
RBC # BLD AUTO: 2.79 M/UL (ref 4.2–5.4)
SODIUM SERPL-SCNC: 143 MMOL/L (ref 136–145)
WBC # BLD AUTO: 5.4 K/UL (ref 4.8–10.8)

## 2024-07-22 PROCEDURE — 85025 COMPLETE CBC W/AUTO DIFF WBC: CPT

## 2024-07-22 PROCEDURE — 80053 COMPREHEN METABOLIC PANEL: CPT

## 2024-07-22 PROCEDURE — 97530 THERAPEUTIC ACTIVITIES: CPT

## 2024-07-22 PROCEDURE — 97535 SELF CARE MNGMENT TRAINING: CPT

## 2024-07-22 PROCEDURE — 1180000000 HC REHAB R&B

## 2024-07-22 PROCEDURE — 97110 THERAPEUTIC EXERCISES: CPT

## 2024-07-22 PROCEDURE — 94760 N-INVAS EAR/PLS OXIMETRY 1: CPT

## 2024-07-22 PROCEDURE — 36415 COLL VENOUS BLD VENIPUNCTURE: CPT

## 2024-07-22 PROCEDURE — 6370000000 HC RX 637 (ALT 250 FOR IP): Performed by: PSYCHIATRY & NEUROLOGY

## 2024-07-22 RX ADMIN — DULOXETINE HYDROCHLORIDE 60 MG: 60 CAPSULE, DELAYED RELEASE ORAL at 09:03

## 2024-07-22 RX ADMIN — CARVEDILOL 25 MG: 12.5 TABLET, FILM COATED ORAL at 17:19

## 2024-07-22 RX ADMIN — APIXABAN 5 MG: 5 TABLET, FILM COATED ORAL at 09:03

## 2024-07-22 RX ADMIN — LOSARTAN POTASSIUM 50 MG: 50 TABLET, FILM COATED ORAL at 09:03

## 2024-07-22 RX ADMIN — MICONAZOLE NITRATE: 2 POWDER TOPICAL at 21:07

## 2024-07-22 RX ADMIN — ESTRADIOL 2 G: 0.1 CREAM VAGINAL at 09:01

## 2024-07-22 RX ADMIN — TRAMADOL HYDROCHLORIDE 50 MG: 50 TABLET ORAL at 21:03

## 2024-07-22 RX ADMIN — PREDNISONE 5 MG: 5 TABLET ORAL at 09:03

## 2024-07-22 RX ADMIN — COLCHICINE 0.6 MG: 0.6 TABLET, FILM COATED ORAL at 09:03

## 2024-07-22 RX ADMIN — PANTOPRAZOLE SODIUM 40 MG: 40 TABLET, DELAYED RELEASE ORAL at 09:03

## 2024-07-22 RX ADMIN — MICONAZOLE NITRATE: 2 POWDER TOPICAL at 09:14

## 2024-07-22 RX ADMIN — Medication 500 MG: at 09:03

## 2024-07-22 RX ADMIN — LEFLUNOMIDE 20 MG: 20 TABLET ORAL at 21:04

## 2024-07-22 RX ADMIN — APIXABAN 5 MG: 5 TABLET, FILM COATED ORAL at 21:04

## 2024-07-22 RX ADMIN — ASPIRIN 81 MG: 81 TABLET, COATED ORAL at 09:03

## 2024-07-22 RX ADMIN — FERROUS SULFATE TAB 325 MG (65 MG ELEMENTAL FE) 325 MG: 325 (65 FE) TAB at 09:03

## 2024-07-22 RX ADMIN — LEVOTHYROXINE SODIUM 75 MCG: 75 TABLET ORAL at 05:29

## 2024-07-22 RX ADMIN — ISOSORBIDE MONONITRATE 60 MG: 30 TABLET, EXTENDED RELEASE ORAL at 09:11

## 2024-07-22 RX ADMIN — FOLIC ACID 1 MG: 1 TABLET ORAL at 09:03

## 2024-07-22 RX ADMIN — ALLOPURINOL 100 MG: 100 TABLET ORAL at 09:03

## 2024-07-22 RX ADMIN — Medication 1 TABLET: at 09:03

## 2024-07-22 RX ADMIN — METHENAMINE HIPPURATE 1 G: 1000 TABLET ORAL at 09:03

## 2024-07-22 RX ADMIN — METHENAMINE HIPPURATE 1 G: 1000 TABLET ORAL at 17:20

## 2024-07-22 ASSESSMENT — PAIN DESCRIPTION - LOCATION: LOCATION: HIP

## 2024-07-22 ASSESSMENT — PAIN - FUNCTIONAL ASSESSMENT: PAIN_FUNCTIONAL_ASSESSMENT: ACTIVITIES ARE NOT PREVENTED

## 2024-07-22 ASSESSMENT — PAIN SCALES - GENERAL
PAINLEVEL_OUTOF10: 3
PAINLEVEL_OUTOF10: 0

## 2024-07-22 ASSESSMENT — PAIN DESCRIPTION - DESCRIPTORS: DESCRIPTORS: ACHING

## 2024-07-22 ASSESSMENT — PAIN DESCRIPTION - ORIENTATION: ORIENTATION: RIGHT

## 2024-07-22 NOTE — PROGRESS NOTES
Nutrition Assessment     Type and Reason for Visit: Reassess    Malnutrition Assessment:  Malnutrition Status: At risk for malnutrition (Comment)    Nutrition Assessment:  PO intake remains >50% at most meals. Wt is stable. Last documented BM 7/15. PRN Glycolax available. Will continue to follow for nutrition-related needs.    Estimated Daily Nutrient Needs:  Energy (kcal):  7412-7151 (15-18kcal/kg) Weight Used for Energy Requirements: Current     Protein (g):   (1.2-2.0g/kg) Weight Used for Protein Requirements: Ideal        Fluid (ml/day):  0702-1154 Method Used for Fluid Requirements: 1 ml/kcal    Nutrition Related Findings:   +1 BLE edema, BM 7/15 Wound Type: Open Wounds (buttocks)    Current Nutrition Therapies:    ADULT DIET; Regular; No scrambled eggs  ADULT ORAL NUTRITION SUPPLEMENT; Breakfast, Dinner; Low Calorie/High Protein Oral Supplement    Anthropometric Measures:  Height: 167.6 cm (5' 6\")  Current Body Wt: 89.5 kg (197 lb 6.4 oz)   BMI: 31.9    Nutrition Diagnosis:   Increased nutrient needs related to increase demand for energy/nutrients as evidenced by wounds    Nutrition Interventions:   Food and/or Nutrient Delivery: Continue Current Diet, Continue Oral Nutrition Supplement  Nutrition Education/Counseling: No recommendation at this time  Coordination of Nutrition Care: Continue to monitor while inpatient       Goals:  Previous Goal Met: Goal(s) Achieved  Goals: PO intake 50% or greater       Nutrition Monitoring and Evaluation:   Behavioral-Environmental Outcomes: None Identified  Food/Nutrient Intake Outcomes: Food and Nutrient Intake, Supplement Intake  Physical Signs/Symptoms Outcomes: Biochemical Data, Weight, Nutrition Focused Physical Findings, Constipation, Skin    Discharge Planning:    No discharge needs at this time     Carrie Montoya, MS, RD, LD, [facility]  Contact: [phone]

## 2024-07-22 NOTE — PLAN OF CARE
Problem: Discharge Planning  Goal: Discharge to home or other facility with appropriate resources  7/21/2024 2330 by Jessica Almaguer LPN  Outcome: Progressing  7/21/2024 1311 by Myriam Evans, RN  Outcome: Progressing  Flowsheets (Taken 7/21/2024 1256)  Discharge to home or other facility with appropriate resources: Refer to discharge planning if patient needs post-hospital services based on physician order or complex needs related to functional status, cognitive ability or social support system     Problem: Skin/Tissue Integrity  Goal: Absence of new skin breakdown  Description: 1.  Monitor for areas of redness and/or skin breakdown  2.  Assess vascular access sites hourly  3.  Every 4-6 hours minimum:  Change oxygen saturation probe site  4.  Every 4-6 hours:  If on nasal continuous positive airway pressure, respiratory therapy assess nares and determine need for appliance change or resting period.  7/21/2024 2330 by Jessica Almaguer LPN  Outcome: Progressing  7/21/2024 1311 by Myriam Evans, RN  Outcome: Progressing     Problem: Safety - Adult  Goal: Free from fall injury  7/21/2024 2330 by Jessica Almaguer LPN  Outcome: Progressing  7/21/2024 1311 by Myriam Evans, RN  Outcome: Progressing     Problem: ABCDS Injury Assessment  Goal: Absence of physical injury  7/21/2024 2330 by Jessica Almaguer LPN  Outcome: Progressing  7/21/2024 1311 by Myriam Evans, RN  Outcome: Progressing     Problem: Nutrition Deficit:  Goal: Optimize nutritional status  7/21/2024 2330 by Jessica Almaguer LPN  Outcome: Progressing  7/21/2024 1311 by Myriam Evans, RN  Outcome: Progressing     Problem: Pain  Goal: Verbalizes/displays adequate comfort level or baseline comfort level  7/21/2024 2330 by Jessica Almaguer LPN  Outcome: Progressing  7/21/2024 1311 by yMriam Evans, RN  Outcome: Progressing

## 2024-07-22 NOTE — PROGRESS NOTES
Occupational Therapy  Facility/Department: [facility] 8 REHAB UNIT  Rehabilitation Occupational Therapy Daily Treatment Note    Date: 24  Patient Name: Kelsey Morejon       Room: 0820/820-02  MRN: [id number]  Account: [id number]   : 1955  (69 y.o.) Gender: female        Diagnosis: Acute cholecystitis, s/p cholecystectomy       Treatment Diagnosis: Acute cholecystitis, s/p cholecystectomy   Past Medical History:  has a past medical history of CAD (coronary artery disease), Cellulitis, History of blood transfusion, History of DVT (deep vein thrombosis), History of neutropenia, History of pulmonary fibrosis, Hx of blood clots, Hypercholesterolemia, Hypertension, Intrinsic asthma, Pacemaker, Post op infection, Rheumatoid arteritis (HCC), Sinus node dysfunction (HCC), Staph aureus infection, Status post placement of implantable loop recorder, Syncope, and Thyroid disease.  Past Surgical History:   has a past surgical history that includes Diagnostic Cardiac Cath Lab Procedure; LEEP; Coronary angioplasty with stent (); Pacemaker insertion (2016); back surgery (2018); pr arthrp kne condyle&platu medial&lat compartments (Right, 2018); pr i&d deep absc bursa/hematoma thigh/knee region (Right, 10/23/2018); Cardiac catheterization (14  MDL); Cardiac catheterization (1/26/15  MDL); pacemaker placement; joint replacement; Revision total knee arthroplasty (Right, 2018); and Cholecystectomy (2024).    Restrictions  Restrictions/Precautions: Fall Risk, Isolation    Subjective     24 1345   General   Family / Caregiver Present Yes   Diagnosis Acute cholecystitis, s/p cholecystectomy   Pain Screening   Pain at present 0   Scale Used Numeric Score     Objective     24 1345   Balance   Sitting Balance Supervision   Functional Mobility   Functional - Mobility Device Wheelchair  (PWC)   Activity To/From therapy gym   Assist Level Supervision        24 1345   Transfers   Stand  Pivot Transfers  --    Sit to stand Minimal assistance   Stand to sit Minimal assistance   Transfer Comments with kamlesh maikel-pt able to pull to stand using bar with min A; PWC<>recliner      07/22/24 1345   OT Exercises   Exercise Treatment [place], 10#, 15 reps, 4 sets; 3# Tbar, 10 reps, 2 sets, all planes      07/22/24 1345   Safety Devices   Type of Devices Call light within reach;Chair alarm in place;Left in chair     Assessment     07/22/24 1345   Assessment   Performance deficits / Impairments Decreased functional mobility ;Decreased ADL status;Decreased strength;Decreased endurance;Decreased balance;Decreased high-level IADLs   Treatment Diagnosis Acute cholecystitis, s/p cholecystectomy      07/22/24 1345   Activity Tolerance   Activity Tolerance Patient Tolerated treatment well     Patient Education     07/22/24 1345   Education   Education Given To Patient   Education Provided Home Exercise Program;Mobility Training;Transfer Training   Education Method Demonstration;Verbal   Barriers to Learning None   Education Outcome Verbalized understanding;Demonstrated understanding     Plan  Occupational Therapy Plan  Current Treatment Recommendations: Strengthening;Balance training;Functional mobility training;Patient/Caregiver education & training;Equipment evaluation, education, & procurement;Safety education & training;Self-Care / ADL;Home management training      Therapy Time   Individual Concurrent Group Co-treatment   Time In 1345         Time Out 1430         Minutes 45         Timed Code Treatment Minutes: 45 Minutes     Electronically signed by SHABBIR Shepherd on 7/22/2024 at 4:09 PM

## 2024-07-22 NOTE — PROGRESS NOTES
Physical Therapy  Name: Kelsey Morejon  MRN:  [id number]  Date of service:  7/22/2024 07/22/24 0900   Restrictions/Precautions   Restrictions/Precautions Fall Risk;Isolation   General   Additional Pertinent Hx RA, pacemaker, CAD, MI, DVT, CHF, neurogenic bladder, HTN, charcot's joint, psoriasis vulgaris, osteoporosis, hypothyroidism, cervical spinal stenosis   Diagnosis Acute cholecystitis   General Comment   Comments in bed, awake with nsg present administering meds   Subjective   Subjective Pt states she had a good weekend. Ready for PT.   Subjective   Subjective denies pain   Bed mobility   Rolling to Left Modified independent   Rolling to Right Modified independent   Supine to Sit Modified independent   Bed Mobility Comments oob to L side with time and side rail; multiple rolling for dressing prior to oob   Transfers   Sit to Stand Minimal Assistance;Contact guard assistance  (pulling up to // bars and SS)   Stand to Sit Contact guard assistance   Bed to Chair Dependent/Total  (SS from BED to WC)   Comment pt preferred SS transfer this am   Ambulation   Surface Level tile   Device Parallel Bars   Assistance Minimal assistance   Quality of Gait FFP; sequence with L LE leading contrary to normal sequence with improved ability; flexed B knees with ER; guarding R LE with SLS though no actual buckle   Gait Deviations Slow Vania;Decreased step length;Decreased step height   Distance 3 steps x 2   Comments seated rest between distances   PT Exercises   A/AROM Exercises QS x 20 ea   Resistive Exercises man resist 2 sets of 10: laq, hip flex, hip ext, hip abd, hs curls   Static Standing Balance Exercises standing in // bars with wt shifting   Assessment   Assessment pt demonstrated improved ability to advance L LE leading with L LE first (contrary to normal sequence); fatigues quickly though gives good effort and motivated to return to PLOF   PT Individual Minutes   Time In 0900   Time Out 1000   Minutes 60        Electronically signed by Brissa Wood PTA on 7/22/2024 at 10:06 AM

## 2024-07-22 NOTE — PATIENT CARE CONFERENCE
4: Patient will complete toileting with Max A.  Short Term Goal 5: MET    LTGs:  Long Term Goals  Time Frame for Long Term Goals : 3-4 weeks  Long Term Goal 1: Setup with UB dressing.  Long Term Goal 2: Min A with LB dressing.  Long Term Goal 3: Min A with donning/doffing socks and shoes.  Long Term Goal 4: Min A with bathing hygiene.  Long Term Goal 5: Patient verbalize DME needs.  Long Term Goal 6: Mod A with toileting.  Long Term Goal 7: MET    Assessment:  Performance deficits / Impairments: Decreased functional mobility , Decreased ADL status, Decreased strength, Decreased endurance, Decreased balance, Decreased high-level IADLs                 NUTRITION  Current Wt: Weight - Scale: 89.5 kg (197 lb 6.4 oz) / Body mass index is 31.86 kg/m².  Admission Wt: Admission Body Weight: 91.3 kg (201 lb 4.5 oz)  Oral Diet Orders:   Regular  Oral Nutrition Supplement (ONS) Orders:  Ensure High Protein once daily  PO intake remains >50% at most meals. Wt is stable. Last documented BM 7/15. PRN Glycolax available. Will continue to follow for nutrition-related needs. Please see nutrition note for details.      NURSING    Past Medical History:        Diagnosis Date    CAD (coronary artery disease)     S/p Stent to right coronary artery 9/2010. S/p myocardial infarction May 2013.    Cellulitis     LT LEG IN PAST    History of blood transfusion     History of DVT (deep vein thrombosis)     LT    History of neutropenia     History of pulmonary fibrosis     Hx of blood clots 1992    LT LEG    Hypercholesterolemia     Hypertension     Intrinsic asthma     Pacemaker 11/17/2016    Post op infection     \"right knee leaking after my replacement\"    Rheumatoid arteritis (HCC)     Sinus node dysfunction (HCC)     Staph aureus infection     Status post placement of implantable loop recorder 2/13/15, 3/30/15    2/19/15  removed, infection    Syncope 2/11/2015    Thyroid disease     HYPOTHYROIDISM       Vitals:    07/22/24 1618 07/23/24  Therapist: Shantelle Sheets, OTR/L  Physical Therapist: Du Herr PT,DPT  Speech Therapist: N/A  Nurse: Christal Stiles,RN   Nurse Manager:  Maryan Coffman, RN, BSN  Dietitian:  Carrie Montoya, MS, LD, RD      As the Medical Director:  I was physically present and lead the team conference discussion, and I approve the established interdisciplinary plan of care   as documented within the medical record of Kelsey Morejon.

## 2024-07-22 NOTE — PROGRESS NOTES
Physical Therapy  Name: Kelsey Morejon  MRN:  [id number]  Date of service:  7/22/2024 07/22/24 1300   Restrictions/Precautions   Restrictions/Precautions Fall Risk;Isolation   General   Additional Pertinent Hx RA, pacemaker, CAD, MI, DVT, CHF, neurogenic bladder, HTN, charcot's joint, psoriasis vulgaris, osteoporosis, hypothyroidism, cervical spinal stenosis   Diagnosis Acute cholecystitis   General Comment   Comments in recliner, post lunch   Subjective   Subjective Pt ready for PT. No new to report.   Subjective   Subjective denies pain   Transfers   Sit to Stand Minimal Assistance;Contact guard assistance  (pulling up to // bars and SS)   Stand to Sit Contact guard assistance   Bed to Chair Dependent/Total;Minimal assistance;Moderate assistance  (SS from recliner to power chair; power chair<>elevated mat table min-mod@)   PT Exercises   A/AROM Exercises ADD sets x 20   Resistive Exercises 2 sets of 10 man resist: laq,   Assessment   Assessment cont dec standing endurance and difficulty completing turn with RW though did manage transfer x 2; will cont to benefit from strengthening/conditioning to further improve functional mob   Safety Devices   Type of Devices Left in chair  (to OT via power chair)   PT Individual Minutes   Time In 1300   Time Out 1345   Minutes 45         Electronically signed by Brissa Wood PTA on 7/22/2024 at 4:25 PM

## 2024-07-22 NOTE — PROGRESS NOTES
education & training, Equipment evaluation, education, & procurement, Safety education & training, Self-Care / ADL, Home management training     Restrictions  Restrictions/Precautions  Restrictions/Precautions: Fall Risk, Isolation    Subjective   General  Chart Reviewed: Yes  Patient assessed for rehabilitation services?: Yes  Response to previous treatment: Patient with no complaints from previous session  Family / Caregiver Present: Yes  Diagnosis: Acute cholecystitis, s/p cholecystectomy    Social/Functional History  Social/Functional History  Lives With: Home care staff (24/7 care with 2 cargivers taking turns.)  Type of Home: House  Home Layout: One level  Home Access: Level entry  Bathroom Shower/Tub: Walk-in shower  Bathroom Equipment: Grab bars around toilet, Grab bars in shower, 3-in-1 commode, Shower chair (with handles)  Home Equipment: Slide board, Wheelchair - Electric, Wheelchair - Manual, Walker - Rolling, Lift chair  Has the patient had two or more falls in the past year or any fall with injury in the past year?: No  Homemaking Responsibilities: No  Ambulation Assistance: Needs assistance (CGA)  Transfer Assistance: Needs assistance (CGA)  IADL Comments: Caregiver does all homemaking tasks.    Objective   Functional Mobility  Functional - Mobility Device: Wheelchair  Activity: To/From therapy gym, To/from bathroom  Assist Level: Supervision  Functional Mobility Comments: Pt. room, OT gym, and halls.  Temp: (!) 96.3 °F (35.7 °C)  Pulse: 87  Heart Rate Source: Monitor  Respirations: 16  SpO2: 95 %  O2 Device: None (Room air)  BP: (!) 173/86  MAP (Calculated): 115  BP Location: Right upper arm  Patient Position: Supine    Safety Devices  Type of Devices: Call light within reach;Chair alarm in place    Toilet Transfers  Toilet - Technique: Stand pivot  Equipment Used: Standard bedside commode  Toilet Transfer: Moderate assistance  Toilet Transfers Comments: Electric w/c to BSC over toilet, utilized GB,  7/22/2024 at 1:13 PM

## 2024-07-23 LAB
ALBUMIN SERPL-MCNC: 2.8 G/DL (ref 3.5–5.2)
ALP SERPL-CCNC: 82 U/L (ref 35–104)
ALT SERPL-CCNC: 23 U/L (ref 5–33)
ANION GAP SERPL CALCULATED.3IONS-SCNC: 12 MMOL/L (ref 7–19)
ANISOCYTOSIS BLD QL SMEAR: ABNORMAL
AST SERPL-CCNC: 25 U/L (ref 5–32)
BASOPHILS # BLD: 0.1 K/UL (ref 0–0.2)
BASOPHILS NFR BLD: 1.4 % (ref 0–1)
BILIRUB SERPL-MCNC: 0.3 MG/DL (ref 0.2–1.2)
BUN SERPL-MCNC: 17 MG/DL (ref 8–23)
CALCIUM SERPL-MCNC: 8.5 MG/DL (ref 8.8–10.2)
CHLORIDE SERPL-SCNC: 108 MMOL/L (ref 98–111)
CO2 SERPL-SCNC: 22 MMOL/L (ref 22–29)
CREAT SERPL-MCNC: 0.9 MG/DL (ref 0.5–0.9)
DACRYOCYTES BLD QL SMEAR: ABNORMAL
EOSINOPHIL # BLD: 0.4 K/UL (ref 0–0.6)
EOSINOPHIL NFR BLD: 6.6 % (ref 0–5)
ERYTHROCYTE [DISTWIDTH] IN BLOOD BY AUTOMATED COUNT: 21.2 % (ref 11.5–14.5)
GLUCOSE SERPL-MCNC: 94 MG/DL (ref 74–109)
HCT VFR BLD AUTO: 25.8 % (ref 37–47)
HGB BLD-MCNC: 7.3 G/DL (ref 12–16)
HYPOCHROMIA BLD QL SMEAR: ABNORMAL
IMM GRANULOCYTES # BLD: 0.2 K/UL
LYMPHOCYTES # BLD: 2.4 K/UL (ref 1.1–4.5)
LYMPHOCYTES NFR BLD: 37.9 % (ref 20–40)
MCH RBC QN AUTO: 27.3 PG (ref 27–31)
MCHC RBC AUTO-ENTMCNC: 28.3 G/DL (ref 33–37)
MCV RBC AUTO: 96.6 FL (ref 81–99)
MONOCYTES # BLD: 0.8 K/UL (ref 0–0.9)
MONOCYTES NFR BLD: 13.5 % (ref 0–10)
NEUTROPHILS # BLD: 2.3 K/UL (ref 1.5–7.5)
NEUTS SEG NFR BLD: 36.9 % (ref 50–65)
OVALOCYTES BLD QL SMEAR: ABNORMAL
PLATELET # BLD AUTO: 139 K/UL (ref 130–400)
PLATELET SLIDE REVIEW: ADEQUATE
PMV BLD AUTO: 10 FL (ref 9.4–12.3)
POIKILOCYTOSIS BLD QL SMEAR: ABNORMAL
POLYCHROMASIA BLD QL SMEAR: ABNORMAL
POTASSIUM SERPL-SCNC: 4.3 MMOL/L (ref 3.5–5)
PROT SERPL-MCNC: 5.6 G/DL (ref 6.6–8.7)
RBC # BLD AUTO: 2.67 M/UL (ref 4.2–5.4)
SCHISTOCYTES BLD QL SMEAR: ABNORMAL
SODIUM SERPL-SCNC: 142 MMOL/L (ref 136–145)
WBC # BLD AUTO: 6.2 K/UL (ref 4.8–10.8)

## 2024-07-23 PROCEDURE — 94760 N-INVAS EAR/PLS OXIMETRY 1: CPT

## 2024-07-23 PROCEDURE — 1180000000 HC REHAB R&B

## 2024-07-23 PROCEDURE — 97535 SELF CARE MNGMENT TRAINING: CPT

## 2024-07-23 PROCEDURE — 6370000000 HC RX 637 (ALT 250 FOR IP): Performed by: PSYCHIATRY & NEUROLOGY

## 2024-07-23 PROCEDURE — 85025 COMPLETE CBC W/AUTO DIFF WBC: CPT

## 2024-07-23 PROCEDURE — 80053 COMPREHEN METABOLIC PANEL: CPT

## 2024-07-23 PROCEDURE — 97110 THERAPEUTIC EXERCISES: CPT

## 2024-07-23 PROCEDURE — 99232 SBSQ HOSP IP/OBS MODERATE 35: CPT | Performed by: PSYCHIATRY & NEUROLOGY

## 2024-07-23 PROCEDURE — 36415 COLL VENOUS BLD VENIPUNCTURE: CPT

## 2024-07-23 RX ADMIN — MICONAZOLE NITRATE: 2 POWDER TOPICAL at 07:34

## 2024-07-23 RX ADMIN — Medication 500 MG: at 07:33

## 2024-07-23 RX ADMIN — CARVEDILOL 25 MG: 12.5 TABLET, FILM COATED ORAL at 07:32

## 2024-07-23 RX ADMIN — PREDNISONE 5 MG: 5 TABLET ORAL at 07:32

## 2024-07-23 RX ADMIN — COLCHICINE 0.6 MG: 0.6 TABLET, FILM COATED ORAL at 07:32

## 2024-07-23 RX ADMIN — TRAMADOL HYDROCHLORIDE 50 MG: 50 TABLET ORAL at 22:06

## 2024-07-23 RX ADMIN — APIXABAN 5 MG: 5 TABLET, FILM COATED ORAL at 07:33

## 2024-07-23 RX ADMIN — METHENAMINE HIPPURATE 1 G: 1000 TABLET ORAL at 07:33

## 2024-07-23 RX ADMIN — LEVOTHYROXINE SODIUM 75 MCG: 75 TABLET ORAL at 05:47

## 2024-07-23 RX ADMIN — ISOSORBIDE MONONITRATE 60 MG: 30 TABLET, EXTENDED RELEASE ORAL at 07:33

## 2024-07-23 RX ADMIN — MICONAZOLE NITRATE: 2 POWDER TOPICAL at 20:05

## 2024-07-23 RX ADMIN — DULOXETINE HYDROCHLORIDE 60 MG: 60 CAPSULE, DELAYED RELEASE ORAL at 07:33

## 2024-07-23 RX ADMIN — FERROUS SULFATE TAB 325 MG (65 MG ELEMENTAL FE) 325 MG: 325 (65 FE) TAB at 07:32

## 2024-07-23 RX ADMIN — CARVEDILOL 25 MG: 12.5 TABLET, FILM COATED ORAL at 18:09

## 2024-07-23 RX ADMIN — METHENAMINE HIPPURATE 1 G: 1000 TABLET ORAL at 18:10

## 2024-07-23 RX ADMIN — LEFLUNOMIDE 20 MG: 20 TABLET ORAL at 20:02

## 2024-07-23 RX ADMIN — LOSARTAN POTASSIUM 50 MG: 50 TABLET, FILM COATED ORAL at 07:33

## 2024-07-23 RX ADMIN — ALLOPURINOL 100 MG: 100 TABLET ORAL at 07:32

## 2024-07-23 RX ADMIN — APIXABAN 5 MG: 5 TABLET, FILM COATED ORAL at 20:02

## 2024-07-23 RX ADMIN — PANTOPRAZOLE SODIUM 40 MG: 40 TABLET, DELAYED RELEASE ORAL at 07:33

## 2024-07-23 RX ADMIN — ASPIRIN 81 MG: 81 TABLET, COATED ORAL at 07:33

## 2024-07-23 RX ADMIN — FOLIC ACID 1 MG: 1 TABLET ORAL at 07:32

## 2024-07-23 RX ADMIN — ACETAMINOPHEN 650 MG: 325 TABLET ORAL at 03:58

## 2024-07-23 RX ADMIN — Medication 1 TABLET: at 07:33

## 2024-07-23 RX ADMIN — Medication: at 07:34

## 2024-07-23 ASSESSMENT — PAIN DESCRIPTION - DESCRIPTORS
DESCRIPTORS: ACHING

## 2024-07-23 ASSESSMENT — PAIN DESCRIPTION - ORIENTATION: ORIENTATION: POSTERIOR

## 2024-07-23 ASSESSMENT — PAIN DESCRIPTION - LOCATION
LOCATION: BACK
LOCATION: BUTTOCKS;BACK
LOCATION: NECK

## 2024-07-23 ASSESSMENT — PAIN SCALES - GENERAL
PAINLEVEL_OUTOF10: 5
PAINLEVEL_OUTOF10: 3
PAINLEVEL_OUTOF10: 0
PAINLEVEL_OUTOF10: 3
PAINLEVEL_OUTOF10: 1

## 2024-07-23 ASSESSMENT — PAIN - FUNCTIONAL ASSESSMENT: PAIN_FUNCTIONAL_ASSESSMENT: ACTIVITIES ARE NOT PREVENTED

## 2024-07-23 NOTE — PROGRESS NOTES
Patient:   Kelsey Morejon  MR#:    [id number]   Room:    [facility]820-   YOB: 1955  Date of Progress Note: 7/19/2024  Time of Note                           7:15 AM  Consulting Physician:   Guy Stubbs M.D.  Attending Physician:  Guy Stubbs MD     Chief complaint Status post cholecystectomy     S:This 69 y.o. 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 Patient admitted on 6/13/2024 with AMS. She was found to have UTI  and left ureteral calculus with obstruction and acute hypoxic respiratory failure. She has been treated with Cefepime. Lithotripsy performed on 6/21/2024. GI was consulted on 6/21/2024 for abdominal pain. GB US showed cholelithiasis with small stones and sludge. HIDA scan done showed Non visualization of the gallbladder and could not rule out cystic duct obstruction.  On 07/03/24 she underwent a CHOLECYSTECTOMY LAPAROSCOPIC WITH INTRAOPERATIVE CHOLANGIOGRAM by   Lexa Waddell MD. She tolerated procedure well.   She does have a wound on her R buttock from cyst removal by Dr. Waddell. The wound has opened up and has been dressed w/ wet-to-dry dressings by Home Health. Wound measures 1.2cm x 2 cm x 2.5. Undermining circumferentially reaching 5cm at 4 o'clock. Subcutaneous tissue, wound bed. Small amount of serosanguineous drainage. Aixa wound area is slight erythema but blanchable.  She was complaining about some difficulty swallowing. Speech evaluated her. She is now tolerating a regular diet, thin liquids with meds whole in applesauce.   She is now medically stable and is participating with therapy. She is ready to begin rehab with goal of returning home after rehab dc with support from her caregivers and family.  Previously syncope after large bowel movement.  Was hypotensive.  Noted to have urinary tract infection.  Fluid bolus was given and medications were adjusted.

## 2024-07-23 NOTE — PROGRESS NOTES
Physical Therapy  Name: Kelsey Morejon  MRN:  [id number]  Date of service:  7/23/2024 07/23/24 0902   Restrictions/Precautions   Restrictions/Precautions Fall Risk;Isolation   General   Additional Pertinent Hx RA, pacemaker, CAD, MI, DVT, CHF, neurogenic bladder, HTN, charcot's joint, psoriasis vulgaris, osteoporosis, hypothyroidism, cervical spinal stenosis   Subjective   Subjective I am getting a shower later so I don't want to go anywhere with my bottom hanging out.   Oxygen Therapy   O2 Device None (Room air)   Bed Mobility   Bridging Minimal assistance  (held feet stable  x 10)   Exercises   Straight Leg Raise 20   Quad Sets 20   Gluteal Sets 20   Hip Abduction 20   Knee Short Arc Quad 20   Ankle Pumps 20   Comments assisted on occasion to stretch into full available range.   Short Term Goals   Time Frame for Short Term Goals 1 Week   Short Term Goal 1 Roll L/R with use of bedrails w/ supervision assist   Short Term Goal 2 propel wheelchair 30' with min assist   Short Term Goal 3 perform stand pivot transfer using rw w/ mod assist   Short Term Goal 4 supine to sit transfer with min assist   Short Term Goal 5 scoot EOB with min assist   Long Term Goals   Time Frame for Long Term Goals  2 weeks   Long Term Goal 1 Perform Sit to stand transfer w/ mod assist   Long Term Goal 2 propel wheelchair 50' with CGA   Long Term Goal 3 supine to sit EOB transfer with CGA   Long Term Goal 4 perform car transfer w/ mod assist x1   Long Term Goal 5 bed to chair transfer with mod assist x1   Conditions Requiring Skilled Therapeutic Intervention   Discharge Recommendations Continue to assess pending progress   Activity Tolerance   Activity Tolerance Patient tolerated treatment well;Patient limited by endurance   Physical Therapy Plan   General Plan  minutes of therapy at least 5 out of 7 days a week   Therapy Duration 2 Weeks   Safety Devices   Type of Devices Left in bed;Call light within reach         Electronically  signed by Diamond Russell, PTA on 7/23/2024 at 11:39 AM

## 2024-07-23 NOTE — PROGRESS NOTES
Facility/Department: [facility] 8 REHAB UNIT  Occupational Therapy     Name: Kelsey Morejon  : 1955  MRN: [id number]  Date of Service: 2024    Discharge Recommendations:  Continue to assess pending progress    Patient Diagnosis(es): There were no encounter diagnoses.  Past Medical History:  has a past medical history of CAD (coronary artery disease), Cellulitis, History of blood transfusion, History of DVT (deep vein thrombosis), History of neutropenia, History of pulmonary fibrosis, Hx of blood clots, Hypercholesterolemia, Hypertension, Intrinsic asthma, Pacemaker, Post op infection, Rheumatoid arteritis (HCC), Sinus node dysfunction (HCC), Staph aureus infection, Status post placement of implantable loop recorder, Syncope, and Thyroid disease.  Past Surgical History:  has a past surgical history that includes Diagnostic Cardiac Cath Lab Procedure; LEEP; Coronary angioplasty with stent (); Pacemaker insertion (2016); back surgery (2018); pr arthrp kne condyle&platu medial&lat compartments (Right, 2018); pr i&d deep absc bursa/hematoma thigh/knee region (Right, 10/23/2018); Cardiac catheterization (14  MDL); Cardiac catheterization (1/26/15  MDL); pacemaker placement; joint replacement; Revision total knee arthroplasty (Right, 2018); and Cholecystectomy (2024).    Treatment Diagnosis: Acute cholecystitis, s/p cholecystectomy      Assessment   Performance deficits / Impairments: Decreased functional mobility ;Decreased ADL status;Decreased strength;Decreased endurance;Decreased balance;Decreased high-level IADLs  Treatment Diagnosis: Acute cholecystitis, s/p cholecystectomy  Prognosis: Good  Activity Tolerance  Activity Tolerance: Patient Tolerated treatment well       Plan   Occupational Therapy Plan  Specific Instructions for Next Treatment: AE for footwear  Current Treatment Recommendations: Strengthening, Balance training, Functional mobility training, Patient/Caregiver  donning/doffing socks and shoes.  Long Term Goal 4: Min A with bathing hygiene.  Long Term Goal 5: MET  Long Term Goal 6: Mod A with toileting.  Long Term Goal 7: MET  Patient Goals   Patient goals : Return home independently.    Therapy Time   Individual Concurrent Group Co-treatment   Time In 0945         Time Out 1115         Minutes 90         Timed Code Treatment Minutes: 90 Minutes    Electronically signed by MERY Cardenas on 7/23/2024 at 12:03 PM

## 2024-07-23 NOTE — PROGRESS NOTES
Physical Therapy  Name: Kelsey Morejon  MRN:  [id number]  Date of service:  7/23/2024 07/23/24 1300   Restrictions/Precautions   Restrictions/Precautions Fall Risk;Isolation   General   Additional Pertinent Hx RA, pacemaker, CAD, MI, DVT, CHF, neurogenic bladder, HTN, charcot's joint, psoriasis vulgaris, osteoporosis, hypothyroidism, cervical spinal stenosis   Diagnosis Acute cholecystitis   General Comment   Comments in bed, needing pants on   Subjective   Subjective Pt agreable and apologetic for not having pants on.   Subjective   Subjective denies pain   Bed mobility   Rolling to Left Modified independent   Rolling to Right Modified independent   Supine to Sit Modified independent   Bed Mobility Comments oob to L side with time and side rail; multiple rolling for dressing prior to oob   Wheelchair Activities   Propulsion Yes   Propulsion 1   Propulsion Power   Level Level Tile   Method RUE   Level of Assistance Independent   Distance 250' x 2   PT Exercises   Static Standing Balance Exercises standing in // bars with wt shifting; static standing timed 2-3 times with 35\" being best; pt able to demonstrate one handed reaching while standing w/o lob   Dynamic Standing Balance Exercises pivot practice in // bars L and R x 2 ea with seated rest between each turn; able to lift R LE but not L during pivot and greater difficulty turning to face R side   Exercise Equipment SS from bed>power chair with EDU for caregiver   Assessment   Assessment Pt relying less on arms for static standing and demonstrated longer standing duration. Cont with flexed knees and difficulty making turns especially to face R side. Caregiver given instruction for SS use to transfer pt but will need further practice and review.   Education   Education Given To Patient;Caregiver   Education Provided Transfer Training   Education Provided Comments use of SS for transfers   Education Method Demonstration;Verbal   Education Outcome Continued

## 2024-07-23 NOTE — PLAN OF CARE
Problem: Discharge Planning  Goal: Discharge to home or other facility with appropriate resources  7/23/2024 0022 by Jessica Almaguer LPN  Outcome: Progressing  7/22/2024 1106 by Angelina Wynne RN  Outcome: Progressing     Problem: Skin/Tissue Integrity  Goal: Absence of new skin breakdown  Description: 1.  Monitor for areas of redness and/or skin breakdown  2.  Assess vascular access sites hourly  3.  Every 4-6 hours minimum:  Change oxygen saturation probe site  4.  Every 4-6 hours:  If on nasal continuous positive airway pressure, respiratory therapy assess nares and determine need for appliance change or resting period.  7/23/2024 0022 by Jessica Almaguer LPN  Outcome: Progressing  7/22/2024 1106 by Angelina Wynne RN  Outcome: Progressing     Problem: Safety - Adult  Goal: Free from fall injury  7/23/2024 0022 by Jessica Almaguer LPN  Outcome: Progressing  7/22/2024 1106 by Angelina Wynne RN  Outcome: Progressing     Problem: ABCDS Injury Assessment  Goal: Absence of physical injury  7/23/2024 0022 by Jessica Almaguer LPN  Outcome: Progressing  7/22/2024 1106 by Angelina Wynne RN  Outcome: Progressing     Problem: Nutrition Deficit:  Goal: Optimize nutritional status  7/23/2024 0022 by Jessica Almaguer LPN  Outcome: Progressing  7/22/2024 1106 by Angelina Wynne RN  Outcome: Progressing  Flowsheets (Taken 7/22/2024 0848 by Carrie Montoya, MS, RD, LD)  Nutrient intake appropriate for improving, restoring, or maintaining nutritional needs:   Monitor oral intake, labs, and treatment plans   Recommend appropriate diets, oral nutritional supplements, and vitamin/mineral supplements     Problem: Pain  Goal: Verbalizes/displays adequate comfort level or baseline comfort level  7/23/2024 0022 by Jessica Almaguer LPN  Outcome: Progressing  7/22/2024 1106 by Angelina Wynne RN  Outcome: Progressing

## 2024-07-23 NOTE — PROGRESS NOTES
pressure from 107 to 69. This recovered promptly after assisting patient back to bed. Patient is on several blood pressure medications and diuretics.  She endorses decreased appetite and oral intake.  Based on her clinical picture, she appears intravascularly depleted.  Lab work unremarkable with the exception of magnesium 1.4, BNP 1,285 (most recently 5,126 on 5/24 @ [facility]), troponin 41 with repeat pending, and hemoglobin 8.8.  She does have a history of MAI and is on iron supplementation.  EKG showed NSR without ischemic changes.  Chest x-ray showed no acute cardiopulmonary abnormality. UA is pending. She denies CP, SOB, fever, chills, abd pain, or diarrhea. She has some nausea intermittently. She does have some regurgitation of medications/food at times and has recently had her esophagus stretched; SLP to evaluate    Syncope  Orthostatic hypotension-improved  Serial orthostatic vitals  Resumed cardiac meds: Spironolactone, imdur, coreg  Continue Monitoring     Hyperkalemia-resolved  Potassium of 5.2 (07/20/024)  Sodium zirconium cyclosilicate 5 g p.o. x 1 dose (07/20/2024)  Monitor BMP.    Hypomagnesemia-resolved  Replace as per protocol     Iron deficiency anemia  Continue ferrous sulfate     HFpEF  -Echo 6/13 @ [facility]: EF 61-65% with grade I DD  -Noted trace edema to BLE, BNP 1,100  -Continue holding diuretics for now     Recent esophageal dilation  SLP on board    Coronary artery disease of native artery of native heart with stable angina pectoris (HCC)  s/p PCI to RCA in 2010 and LAD in 2013  Continue optimize medical management     History of pulmonary fibrosis  Bronchodilators as needed  Oxygen supplementation as needed.     History of DVT  Apixaban 5 mg p.o. twice daily.      Hypothyroidism  Levothyroxine 75 mcg p.o. daily       Paraplegia   -Noted  -Previously had been able to pivot and transfer, has not been able to in ~1 month due to weakness  -PT/OT     Neurogenic bladder  Recently treated for UTI with

## 2024-07-24 PROCEDURE — 6370000000 HC RX 637 (ALT 250 FOR IP): Performed by: PSYCHIATRY & NEUROLOGY

## 2024-07-24 PROCEDURE — 97530 THERAPEUTIC ACTIVITIES: CPT

## 2024-07-24 PROCEDURE — 1180000000 HC REHAB R&B

## 2024-07-24 PROCEDURE — 97535 SELF CARE MNGMENT TRAINING: CPT

## 2024-07-24 PROCEDURE — 99232 SBSQ HOSP IP/OBS MODERATE 35: CPT | Performed by: PSYCHIATRY & NEUROLOGY

## 2024-07-24 PROCEDURE — 97110 THERAPEUTIC EXERCISES: CPT

## 2024-07-24 RX ADMIN — APIXABAN 5 MG: 5 TABLET, FILM COATED ORAL at 08:39

## 2024-07-24 RX ADMIN — FOLIC ACID 1 MG: 1 TABLET ORAL at 08:40

## 2024-07-24 RX ADMIN — APIXABAN 5 MG: 5 TABLET, FILM COATED ORAL at 21:00

## 2024-07-24 RX ADMIN — ISOSORBIDE MONONITRATE 60 MG: 30 TABLET, EXTENDED RELEASE ORAL at 08:39

## 2024-07-24 RX ADMIN — CARVEDILOL 25 MG: 12.5 TABLET, FILM COATED ORAL at 17:38

## 2024-07-24 RX ADMIN — LEFLUNOMIDE 20 MG: 20 TABLET ORAL at 20:59

## 2024-07-24 RX ADMIN — Medication 500 MG: at 08:39

## 2024-07-24 RX ADMIN — ALLOPURINOL 100 MG: 100 TABLET ORAL at 08:40

## 2024-07-24 RX ADMIN — METHENAMINE HIPPURATE 1 G: 1000 TABLET ORAL at 17:38

## 2024-07-24 RX ADMIN — DULOXETINE HYDROCHLORIDE 60 MG: 60 CAPSULE, DELAYED RELEASE ORAL at 08:40

## 2024-07-24 RX ADMIN — PREDNISONE 5 MG: 5 TABLET ORAL at 08:39

## 2024-07-24 RX ADMIN — MICONAZOLE NITRATE: 2 POWDER TOPICAL at 21:01

## 2024-07-24 RX ADMIN — CARVEDILOL 25 MG: 12.5 TABLET, FILM COATED ORAL at 08:40

## 2024-07-24 RX ADMIN — ASPIRIN 81 MG: 81 TABLET, COATED ORAL at 08:40

## 2024-07-24 RX ADMIN — TRAMADOL HYDROCHLORIDE 50 MG: 50 TABLET ORAL at 21:00

## 2024-07-24 RX ADMIN — LEVOTHYROXINE SODIUM 75 MCG: 75 TABLET ORAL at 05:57

## 2024-07-24 RX ADMIN — COLCHICINE 0.6 MG: 0.6 TABLET, FILM COATED ORAL at 08:39

## 2024-07-24 RX ADMIN — METHENAMINE HIPPURATE 1 G: 1000 TABLET ORAL at 08:39

## 2024-07-24 RX ADMIN — Medication 1 TABLET: at 08:40

## 2024-07-24 RX ADMIN — PANTOPRAZOLE SODIUM 40 MG: 40 TABLET, DELAYED RELEASE ORAL at 08:40

## 2024-07-24 RX ADMIN — FERROUS SULFATE TAB 325 MG (65 MG ELEMENTAL FE) 325 MG: 325 (65 FE) TAB at 08:40

## 2024-07-24 RX ADMIN — LOSARTAN POTASSIUM 50 MG: 50 TABLET, FILM COATED ORAL at 08:40

## 2024-07-24 RX ADMIN — SPIRONOLACTONE 25 MG: 25 TABLET ORAL at 08:40

## 2024-07-24 ASSESSMENT — PAIN SCALES - GENERAL
PAINLEVEL_OUTOF10: 0
PAINLEVEL_OUTOF10: 2
PAINLEVEL_OUTOF10: 5

## 2024-07-24 ASSESSMENT — PAIN DESCRIPTION - ORIENTATION
ORIENTATION: LOWER
ORIENTATION: RIGHT;LEFT

## 2024-07-24 ASSESSMENT — PAIN DESCRIPTION - LOCATION
LOCATION: FOOT
LOCATION: BUTTOCKS;BACK

## 2024-07-24 ASSESSMENT — PAIN DESCRIPTION - DESCRIPTORS
DESCRIPTORS: ACHING
DESCRIPTORS: ACHING

## 2024-07-24 NOTE — PROGRESS NOTES
Physical Therapy  Name: Kelsey Morejon  MRN:  [id number]  Date of service:  7/24/2024 07/24/24 1410   Restrictions/Precautions   Restrictions/Precautions Fall Risk;Isolation   General   Additional Pertinent Hx RA, pacemaker, CAD, MI, DVT, CHF, neurogenic bladder, HTN, charcot's joint, psoriasis vulgaris, osteoporosis, hypothyroidism, cervical spinal stenosis   Diagnosis Acute cholecystitis   General Comment   Comments in recliner, almost napping   Subjective   Subjective Ready and quickly starts to reposition for transfer to power chair.   Transfers   Bed to Chair Dependent/Total  (SS from recliner to power chair)   Comment worked on transfers with RW in am see 1000 note on 7/24   Ambulation   Surface Level tile   Device Parallel Bars   Assistance Minimal assistance   Quality of Gait FFP; sequence with R LE leading (previously had to use L LE lead); flexed B knees with ER; guarding R LE with SLS though no actual buckle   Gait Deviations Slow Vania;Decreased step length;Decreased step height   Distance 3 small steps fwd and back, 2 small steps fwd and back   Comments seated rest between distances;pt becomes progressively more flexed carlo with bkwds steps and begins to wb on forearms on // bars rather than down through hands   PT Exercises   Resistive Exercises x 20 man resist: hip abd, hip add, hip ext, laq, hip flex   Static Standing Balance Exercises standing in // bars with wt shifting; static standing timed 2-3 times with 35\" being best; pt able to demonstrate one handed reaching while standing w/o lob   Assessment   Assessment Inconsistencies with ability to step needing to lead with R LE for amb in contrast to previous need to lead with L. Pt does better with fwd than retro steps and becomes progressively more forward flexed bearing wt through forearms to offload LEs.   Safety Devices   Type of Devices Left in chair  (to OT post session)   PT Individual Minutes   Time In 1410   Time Out 1440   Minutes 30

## 2024-07-24 NOTE — PLAN OF CARE
Problem: Discharge Planning  Goal: Discharge to home or other facility with appropriate resources  7/23/2024 2254 by Malorie Hua RN  Outcome: Progressing  Flowsheets (Taken 7/23/2024 1908)  Discharge to home or other facility with appropriate resources: Refer to discharge planning if patient needs post-hospital services based on physician order or complex needs related to functional status, cognitive ability or social support system  7/23/2024 1202 by Angelina Wynne RN  Outcome: Progressing     Problem: Skin/Tissue Integrity  Goal: Absence of new skin breakdown  Description: 1.  Monitor for areas of redness and/or skin breakdown  2.  Assess vascular access sites hourly  3.  Every 4-6 hours minimum:  Change oxygen saturation probe site  4.  Every 4-6 hours:  If on nasal continuous positive airway pressure, respiratory therapy assess nares and determine need for appliance change or resting period.  7/23/2024 2254 by Malorie Hua RN  Outcome: Progressing  7/23/2024 1202 by Angelina Wynne RN  Outcome: Progressing     Problem: Safety - Adult  Goal: Free from fall injury  7/23/2024 2254 by Malorie Hua RN  Outcome: Progressing  7/23/2024 1202 by Angelina Wynne RN  Outcome: Progressing     Problem: ABCDS Injury Assessment  Goal: Absence of physical injury  7/23/2024 2254 by Malorie Hua RN  Outcome: Progressing  7/23/2024 1202 by Angelina Wynne RN  Outcome: Progressing     Problem: Nutrition Deficit:  Goal: Optimize nutritional status  7/23/2024 2254 by Malorie Hua RN  Outcome: Progressing  7/23/2024 1202 by Angelina Wynne RN  Outcome: Progressing     Problem: Pain  Goal: Verbalizes/displays adequate comfort level or baseline comfort level  7/23/2024 2254 by Malorie Hua RN  Outcome: Progressing  7/23/2024 1202 by Angelina Wynne RN  Outcome: Progressing

## 2024-07-24 NOTE — PROGRESS NOTES
Facility/Department: [facility] 8 REHAB UNIT  Occupational Therapy     Name: Kelsey Morejon  : 1955  MRN: [id number]  Date of Service: 2024    Discharge Recommendations:  Continue to assess pending progress    Patient Diagnosis(es): There were no encounter diagnoses.  Past Medical History:  has a past medical history of CAD (coronary artery disease), Cellulitis, History of blood transfusion, History of DVT (deep vein thrombosis), History of neutropenia, History of pulmonary fibrosis, Hx of blood clots, Hypercholesterolemia, Hypertension, Intrinsic asthma, Pacemaker, Post op infection, Rheumatoid arteritis (HCC), Sinus node dysfunction (HCC), Staph aureus infection, Status post placement of implantable loop recorder, Syncope, and Thyroid disease.  Past Surgical History:  has a past surgical history that includes Diagnostic Cardiac Cath Lab Procedure; LEEP; Coronary angioplasty with stent (); Pacemaker insertion (2016); back surgery (2018); pr arthrp kne condyle&platu medial&lat compartments (Right, 2018); pr i&d deep absc bursa/hematoma thigh/knee region (Right, 10/23/2018); Cardiac catheterization (14  MDL); Cardiac catheterization (1/26/15  MDL); pacemaker placement; joint replacement; Revision total knee arthroplasty (Right, 2018); and Cholecystectomy (2024).    Treatment Diagnosis: Acute cholecystitis, s/p cholecystectomy      Assessment   Performance deficits / Impairments: Decreased functional mobility ;Decreased ADL status;Decreased strength;Decreased endurance;Decreased balance;Decreased high-level IADLs  Treatment Diagnosis: Acute cholecystitis, s/p cholecystectomy  Prognosis: Good  Activity Tolerance  Activity Tolerance: Patient Tolerated treatment well       Plan   Occupational Therapy Plan  Specific Instructions for Next Treatment: AE for footwear  Current Treatment Recommendations: Strengthening, Balance training, Functional mobility training, Patient/Caregiver

## 2024-07-24 NOTE — PROGRESS NOTES
Physical Therapy  Name: Kelsey Morejon  MRN:  [id number]  Date of service:  7/24/2024 07/24/24 1000   Restrictions/Precautions   Restrictions/Precautions Fall Risk;Isolation   Transfers   Bed to Chair Dependent/Total;Minimal assistance;Moderate assistance  (SS from bed to power chair; power chair<>elevated mat table min-mod@)   Wheelchair Activities   Propulsion Yes   Propulsion 1   Propulsion Power   Level Level Tile   Method RUE   Level of Assistance Independent   Distance 250'  and 100'   PT Exercises   A/AROM Exercises x 20 LAQ and QS   Resistive Exercises x 20 man resist ankle PF/DF   Functional Mobility Circuit Training transfers with RW power chair <>mat x 2 ea direction with min-mod@ pt needing most assist to get standing and needs cues to get bottom and feet angled properly prior to stand so that less need for adjustment (stepping/turning)  in standing   Exercise Equipment omnicycle for LE strength/conditioning x 6 min at level 2   Assessment   Assessment Pt able to demonstrate stand pivot with RW x 2 to L and R both with improved ability to move feet today though very small steps and greater difficulty turning to face R side than L. More difficulty coming to stand with need to push up from surface rather than pull as accustomed to doing in // bars or SS.   Safety Devices   Type of Devices Left in chair  (to OT post session)   PT Co-Treatment Minutes   Time In 1005   Time Out 1105   Minutes 60         Electronically signed by Brissa Wood PTA on 7/24/2024 at 11:34 AM

## 2024-07-24 NOTE — PROGRESS NOTES
Facility/Department: [facility] 8 REHAB UNIT  Occupational Therapy     Name: Kelsey Morejon  : 1955  MRN: [id number]  Date of Service: 2024    Discharge Recommendations:  Continue to assess pending progress    Patient Diagnosis(es): There were no encounter diagnoses.  Past Medical History:  has a past medical history of CAD (coronary artery disease), Cellulitis, History of blood transfusion, History of DVT (deep vein thrombosis), History of neutropenia, History of pulmonary fibrosis, Hx of blood clots, Hypercholesterolemia, Hypertension, Intrinsic asthma, Pacemaker, Post op infection, Rheumatoid arteritis (HCC), Sinus node dysfunction (HCC), Staph aureus infection, Status post placement of implantable loop recorder, Syncope, and Thyroid disease.  Past Surgical History:  has a past surgical history that includes Diagnostic Cardiac Cath Lab Procedure; LEEP; Coronary angioplasty with stent (); Pacemaker insertion (2016); back surgery (2018); pr arthrp kne condyle&platu medial&lat compartments (Right, 2018); pr i&d deep absc bursa/hematoma thigh/knee region (Right, 10/23/2018); Cardiac catheterization (14  MDL); Cardiac catheterization (1/26/15  MDL); pacemaker placement; joint replacement; Revision total knee arthroplasty (Right, 2018); and Cholecystectomy (2024).    Treatment Diagnosis: Acute cholecystitis, s/p cholecystectomy    Assessment   Performance deficits / Impairments: Decreased functional mobility ;Decreased ADL status;Decreased strength;Decreased endurance;Decreased balance;Decreased high-level IADLs  Treatment Diagnosis: Acute cholecystitis, s/p cholecystectomy  Prognosis: Good  Activity Tolerance  Activity Tolerance: Patient Tolerated treatment well     Plan   Occupational Therapy Plan  Specific Instructions for Next Treatment: AE for footwear  Current Treatment Recommendations: Strengthening, Balance training, Functional mobility training, Patient/Caregiver

## 2024-07-24 NOTE — PROGRESS NOTES
Patient:   Kelsey Morejon  MR#:    [id number]   Room:    [facility]820-   YOB: 1955  Date of Progress Note: 7/24/2024  Time of Note                           7:27 AM  Consulting Physician:   Guy Stubbs M.D.  Attending Physician:  Guy Stubbs MD     Chief complaint Status post cholecystectomy     S:This 69 y.o. 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 Patient admitted on 6/13/2024 with AMS. She was found to have UTI  and left ureteral calculus with obstruction and acute hypoxic respiratory failure. She has been treated with Cefepime. Lithotripsy performed on 6/21/2024. GI was consulted on 6/21/2024 for abdominal pain. GB US showed cholelithiasis with small stones and sludge. HIDA scan done showed Non visualization of the gallbladder and could not rule out cystic duct obstruction.  On 07/03/24 she underwent a CHOLECYSTECTOMY LAPAROSCOPIC WITH INTRAOPERATIVE CHOLANGIOGRAM by   Lexa Waddell MD. She tolerated procedure well.   She does have a wound on her R buttock from cyst removal by Dr. Waddell. The wound has opened up and has been dressed w/ wet-to-dry dressings by Home Health. Wound measures 1.2cm x 2 cm x 2.5. Undermining circumferentially reaching 5cm at 4 o'clock. Subcutaneous tissue, wound bed. Small amount of serosanguineous drainage. Aixa wound area is slight erythema but blanchable.  She was complaining about some difficulty swallowing. Speech evaluated her. She is now tolerating a regular diet, thin liquids with meds whole in applesauce.   She is now medically stable and is participating with therapy. She is ready to begin rehab with goal of returning home after rehab dc with support from her caregivers and family.  Previously syncope after large bowel movement.  Was hypotensive.  Noted to have urinary tract infection.  Fluid bolus was given and medications were adjusted.   wt shifting; static standing timed 2-3 times with 35\" being best; pt able to demonstrate one handed reaching while standing w/o lob   Dynamic Standing Balance Exercises pivot practice in // bars L and R x 2 ea with seated rest between each turn; able to lift R LE but not L during pivot and greater difficulty turning to face R side   Exercise Equipment SS from bed>power chair with EDU for caregiver   Assessment   Assessment Pt relying less on arms for static standing and demonstrated longer standing duration. Cont with flexed knees and difficulty making turns especially to face R side. Caregiver given instruction for SS use to transfer pt but will need further practice and review.   Education   Education Given To Patient;Caregiver   Education Provided Transfer Training   Education Provided Comments use of SS for transfers   Education Method Demonstration;Verbal   Education Outcome Continued education needed  (caregiver with difficulty managing brakes due to choice of shoes and also needs safety cues and reminders for proper operation of parts)   Safety Devices   Type of Devices Left in chair;Call light within reach   PT Individual Minutes   Time In 1300   Time Out 1355   Minutes 55            Electronically signed by Brissa Wood PTA on 7/23/2024 at 3:37 PM                        RECORD REVIEW: Previous medical records, medications were reviewed at today's visit    IMPRESSION:   1.  Status post cholecystectomy-monitor  2.  Gout-allopurinol/colchicine  3.  Coronary artery disease-aspirin/Imdur  4.  Hypertension-on meds monitor  5.  Anemia-on iron  6.  Rheumatoid arthritis-Arava/prednisone  7.  Urinary incontinence-Hiprex  8.  Wound left buttock-wound care  9.  GI-PPI/bowel regimen  10.  Pain control-Percocet as needed/tramadol as needed/Lidoderm patch  11.  Hypothyroidism-Synthroid  12.  Myalgia due to statin  13.  PT/OT     Appreciate hospitalist help  UTI-complete antibiotic    Hypotension-drop of systolic

## 2024-07-25 LAB
ALBUMIN SERPL-MCNC: 2.7 G/DL (ref 3.5–5.2)
ALP SERPL-CCNC: 84 U/L (ref 35–104)
ALT SERPL-CCNC: 26 U/L (ref 5–33)
ANION GAP SERPL CALCULATED.3IONS-SCNC: 13 MMOL/L (ref 7–19)
AST SERPL-CCNC: 24 U/L (ref 5–32)
BASOPHILS # BLD: 0.1 K/UL (ref 0–0.2)
BASOPHILS NFR BLD: 1.1 % (ref 0–1)
BILIRUB SERPL-MCNC: 0.3 MG/DL (ref 0.2–1.2)
BUN SERPL-MCNC: 21 MG/DL (ref 8–23)
CALCIUM SERPL-MCNC: 8.6 MG/DL (ref 8.8–10.2)
CHLORIDE SERPL-SCNC: 111 MMOL/L (ref 98–111)
CO2 SERPL-SCNC: 21 MMOL/L (ref 22–29)
CREAT SERPL-MCNC: 0.9 MG/DL (ref 0.5–0.9)
EOSINOPHIL # BLD: 0.4 K/UL (ref 0–0.6)
EOSINOPHIL NFR BLD: 8.6 % (ref 0–5)
ERYTHROCYTE [DISTWIDTH] IN BLOOD BY AUTOMATED COUNT: 21.1 % (ref 11.5–14.5)
GLUCOSE SERPL-MCNC: 111 MG/DL (ref 74–109)
HCT VFR BLD AUTO: 26.1 % (ref 37–47)
HGB BLD-MCNC: 7.6 G/DL (ref 12–16)
IMM GRANULOCYTES # BLD: 0.1 K/UL
LYMPHOCYTES # BLD: 1.8 K/UL (ref 1.1–4.5)
LYMPHOCYTES NFR BLD: 39.4 % (ref 20–40)
MCH RBC QN AUTO: 28.9 PG (ref 27–31)
MCHC RBC AUTO-ENTMCNC: 29.1 G/DL (ref 33–37)
MCV RBC AUTO: 99.2 FL (ref 81–99)
MONOCYTES # BLD: 0.7 K/UL (ref 0–0.9)
MONOCYTES NFR BLD: 14.8 % (ref 0–10)
NEUTROPHILS # BLD: 1.6 K/UL (ref 1.5–7.5)
NEUTS SEG NFR BLD: 33.3 % (ref 50–65)
PLATELET # BLD AUTO: 168 K/UL (ref 130–400)
PMV BLD AUTO: 12.2 FL (ref 9.4–12.3)
POTASSIUM SERPL-SCNC: 4.2 MMOL/L (ref 3.5–5)
PROT SERPL-MCNC: 5.7 G/DL (ref 6.6–8.7)
RBC # BLD AUTO: 2.63 M/UL (ref 4.2–5.4)
SODIUM SERPL-SCNC: 145 MMOL/L (ref 136–145)
WBC # BLD AUTO: 4.7 K/UL (ref 4.8–10.8)

## 2024-07-25 PROCEDURE — 85025 COMPLETE CBC W/AUTO DIFF WBC: CPT

## 2024-07-25 PROCEDURE — 36415 COLL VENOUS BLD VENIPUNCTURE: CPT

## 2024-07-25 PROCEDURE — 80053 COMPREHEN METABOLIC PANEL: CPT

## 2024-07-25 PROCEDURE — 1180000000 HC REHAB R&B

## 2024-07-25 PROCEDURE — 97535 SELF CARE MNGMENT TRAINING: CPT

## 2024-07-25 PROCEDURE — 94760 N-INVAS EAR/PLS OXIMETRY 1: CPT

## 2024-07-25 PROCEDURE — 6370000000 HC RX 637 (ALT 250 FOR IP): Performed by: PSYCHIATRY & NEUROLOGY

## 2024-07-25 PROCEDURE — 99232 SBSQ HOSP IP/OBS MODERATE 35: CPT | Performed by: PSYCHIATRY & NEUROLOGY

## 2024-07-25 PROCEDURE — 97110 THERAPEUTIC EXERCISES: CPT

## 2024-07-25 RX ORDER — SPIRONOLACTONE 25 MG/1
25 TABLET ORAL DAILY
Qty: 30 TABLET | Refills: 0 | Status: SHIPPED
Start: 2024-07-25 | End: 2024-07-26

## 2024-07-25 RX ORDER — LIDOCAINE 4 G/G
1 PATCH TOPICAL DAILY
Qty: 30 EACH | Refills: 0 | Status: SHIPPED
Start: 2024-07-25 | End: 2024-07-26

## 2024-07-25 RX ORDER — METHENAMINE HIPPURATE 1000 MG/1
1 TABLET ORAL 2 TIMES DAILY WITH MEALS
Qty: 60 TABLET | Refills: 0 | Status: SHIPPED
Start: 2024-07-25 | End: 2024-07-26

## 2024-07-25 RX ORDER — COLCHICINE 0.6 MG/1
0.6 TABLET ORAL DAILY
Qty: 30 TABLET | Refills: 0 | Status: SHIPPED
Start: 2024-07-25 | End: 2024-07-26

## 2024-07-25 RX ORDER — M-VIT,TX,IRON,MINS/CALC/FOLIC 27MG-0.4MG
1 TABLET ORAL DAILY
Qty: 30 TABLET | Refills: 0 | Status: SHIPPED
Start: 2024-07-25 | End: 2024-07-26

## 2024-07-25 RX ORDER — LEVOTHYROXINE SODIUM 0.07 MG/1
75 TABLET ORAL DAILY
Qty: 30 TABLET | Refills: 0 | Status: SHIPPED
Start: 2024-07-26 | End: 2024-07-26

## 2024-07-25 RX ORDER — DULOXETIN HYDROCHLORIDE 60 MG/1
60 CAPSULE, DELAYED RELEASE ORAL DAILY
Qty: 30 CAPSULE | Refills: 0 | Status: SHIPPED
Start: 2024-07-25 | End: 2024-07-26

## 2024-07-25 RX ORDER — LEFLUNOMIDE 20 MG/1
20 TABLET ORAL NIGHTLY
Qty: 30 TABLET | Refills: 0 | Status: SHIPPED
Start: 2024-07-25 | End: 2024-07-26

## 2024-07-25 RX ORDER — TRAMADOL HYDROCHLORIDE 50 MG/1
50 TABLET ORAL EVERY 12 HOURS PRN
Qty: 60 TABLET | Refills: 0 | Status: SHIPPED
Start: 2024-07-25 | End: 2024-07-26

## 2024-07-25 RX ORDER — FOLIC ACID 1 MG/1
1 TABLET ORAL DAILY
Qty: 30 TABLET | Refills: 0 | Status: SHIPPED
Start: 2024-07-25 | End: 2024-07-26

## 2024-07-25 RX ORDER — LOSARTAN POTASSIUM 50 MG/1
50 TABLET ORAL DAILY
Qty: 30 TABLET | Refills: 0 | Status: SHIPPED
Start: 2024-07-25 | End: 2024-07-26

## 2024-07-25 RX ORDER — PREDNISONE 5 MG/1
5 TABLET ORAL DAILY
Qty: 30 TABLET | Refills: 0 | Status: SHIPPED
Start: 2024-07-25 | End: 2024-07-26

## 2024-07-25 RX ORDER — FERROUS SULFATE 325(65) MG
325 TABLET ORAL
Qty: 30 TABLET | Refills: 0 | Status: SHIPPED
Start: 2024-07-25 | End: 2024-07-26

## 2024-07-25 RX ORDER — CARVEDILOL 25 MG/1
25 TABLET ORAL 2 TIMES DAILY WITH MEALS
Qty: 60 TABLET | Refills: 0 | Status: SHIPPED
Start: 2024-07-25 | End: 2024-07-26

## 2024-07-25 RX ORDER — PANTOPRAZOLE SODIUM 40 MG/1
40 TABLET, DELAYED RELEASE ORAL DAILY
Qty: 30 TABLET | Refills: 0 | Status: SHIPPED
Start: 2024-07-25 | End: 2024-07-26

## 2024-07-25 RX ORDER — ISOSORBIDE MONONITRATE 60 MG/1
60 TABLET, EXTENDED RELEASE ORAL DAILY
Qty: 30 TABLET | Refills: 0 | Status: SHIPPED
Start: 2024-07-25 | End: 2024-07-26

## 2024-07-25 RX ORDER — ASCORBIC ACID 500 MG
500 TABLET ORAL DAILY
Qty: 30 TABLET | Refills: 0 | Status: SHIPPED
Start: 2024-07-25 | End: 2024-07-26

## 2024-07-25 RX ORDER — ALLOPURINOL 100 MG/1
100 TABLET ORAL DAILY
Qty: 30 TABLET | Refills: 0 | Status: SHIPPED
Start: 2024-07-25 | End: 2024-07-26

## 2024-07-25 RX ORDER — ASPIRIN 81 MG/1
81 TABLET ORAL DAILY
Qty: 30 TABLET | Refills: 0 | Status: SHIPPED
Start: 2024-07-25 | End: 2024-07-26

## 2024-07-25 RX ORDER — ESTRADIOL 0.1 MG/G
2 CREAM VAGINAL DAILY
Qty: 1 EACH | Refills: 0 | Status: SHIPPED
Start: 2024-07-25 | End: 2024-07-26

## 2024-07-25 RX ADMIN — PANTOPRAZOLE SODIUM 40 MG: 40 TABLET, DELAYED RELEASE ORAL at 10:15

## 2024-07-25 RX ADMIN — FOLIC ACID 1 MG: 1 TABLET ORAL at 10:15

## 2024-07-25 RX ADMIN — CARVEDILOL 25 MG: 12.5 TABLET, FILM COATED ORAL at 10:15

## 2024-07-25 RX ADMIN — ASPIRIN 81 MG: 81 TABLET, COATED ORAL at 10:15

## 2024-07-25 RX ADMIN — METHENAMINE HIPPURATE 1 G: 1000 TABLET ORAL at 17:56

## 2024-07-25 RX ADMIN — FERROUS SULFATE TAB 325 MG (65 MG ELEMENTAL FE) 325 MG: 325 (65 FE) TAB at 10:16

## 2024-07-25 RX ADMIN — APIXABAN 5 MG: 5 TABLET, FILM COATED ORAL at 20:35

## 2024-07-25 RX ADMIN — APIXABAN 5 MG: 5 TABLET, FILM COATED ORAL at 10:16

## 2024-07-25 RX ADMIN — DULOXETINE HYDROCHLORIDE 60 MG: 60 CAPSULE, DELAYED RELEASE ORAL at 10:16

## 2024-07-25 RX ADMIN — ESTRADIOL 2 G: 0.1 CREAM VAGINAL at 20:58

## 2024-07-25 RX ADMIN — LEVOTHYROXINE SODIUM 75 MCG: 75 TABLET ORAL at 05:48

## 2024-07-25 RX ADMIN — ISOSORBIDE MONONITRATE 60 MG: 30 TABLET, EXTENDED RELEASE ORAL at 10:15

## 2024-07-25 RX ADMIN — COLCHICINE 0.6 MG: 0.6 TABLET, FILM COATED ORAL at 10:16

## 2024-07-25 RX ADMIN — Medication 1 TABLET: at 10:15

## 2024-07-25 RX ADMIN — SPIRONOLACTONE 25 MG: 25 TABLET ORAL at 10:16

## 2024-07-25 RX ADMIN — LOSARTAN POTASSIUM 50 MG: 50 TABLET, FILM COATED ORAL at 10:15

## 2024-07-25 RX ADMIN — PREDNISONE 5 MG: 5 TABLET ORAL at 10:16

## 2024-07-25 RX ADMIN — ALLOPURINOL 100 MG: 100 TABLET ORAL at 10:15

## 2024-07-25 RX ADMIN — LEFLUNOMIDE 20 MG: 20 TABLET ORAL at 20:35

## 2024-07-25 RX ADMIN — MICONAZOLE NITRATE: 2 POWDER TOPICAL at 20:36

## 2024-07-25 RX ADMIN — METHENAMINE HIPPURATE 1 G: 1000 TABLET ORAL at 10:14

## 2024-07-25 RX ADMIN — CARVEDILOL 25 MG: 12.5 TABLET, FILM COATED ORAL at 17:56

## 2024-07-25 RX ADMIN — Medication 500 MG: at 10:16

## 2024-07-25 RX ADMIN — TRAMADOL HYDROCHLORIDE 50 MG: 50 TABLET ORAL at 21:01

## 2024-07-25 ASSESSMENT — PAIN SCALES - WONG BAKER: WONGBAKER_NUMERICALRESPONSE: NO HURT

## 2024-07-25 ASSESSMENT — PAIN DESCRIPTION - LOCATION: LOCATION: FOOT

## 2024-07-25 ASSESSMENT — PAIN SCALES - GENERAL
PAINLEVEL_OUTOF10: 0
PAINLEVEL_OUTOF10: 7
PAINLEVEL_OUTOF10: 0

## 2024-07-25 ASSESSMENT — PAIN DESCRIPTION - FREQUENCY: FREQUENCY: INTERMITTENT

## 2024-07-25 ASSESSMENT — PAIN DESCRIPTION - ONSET: ONSET: GRADUAL

## 2024-07-25 ASSESSMENT — PAIN DESCRIPTION - ORIENTATION: ORIENTATION: RIGHT;LEFT

## 2024-07-25 ASSESSMENT — PAIN - FUNCTIONAL ASSESSMENT: PAIN_FUNCTIONAL_ASSESSMENT: ACTIVITIES ARE NOT PREVENTED

## 2024-07-25 ASSESSMENT — PAIN DESCRIPTION - PAIN TYPE: TYPE: CHRONIC PAIN

## 2024-07-25 ASSESSMENT — PAIN DESCRIPTION - DESCRIPTORS: DESCRIPTORS: ACHING

## 2024-07-25 NOTE — CARE COORDINATION
07/24/24 1941   Assessment   Charting Type Shift assessment   Psychosocial   Psychosocial (WDL) WDL   Neurological   Neuro (WDL) X   Level of Consciousness 0   Orientation Level Oriented X4   Cognition Appropriate judgement;Appropriate safety awareness;Appropriate attention/concentration;Appropriate for developmental age;Follows commands   Speech Clear;Appropriate for developmental age   R Pupil Size (mm) 3   R Pupil Shape Round   R Pupil Reaction Brisk   L Pupil Size (mm) 3   L Pupil Shape Round   L Pupil Reaction Brisk   R Hand  Strong   L Hand  Strong   R Foot Dorsiflexion Weak   L Foot Dorsiflexion Moderate   R Foot Plantar Flexion Weak   L Foot Plantar Flexion Moderate   RUE Motor Response Responds to command   RUE Sensation  Full sensation   LUE Motor Response Responds to command   LUE Sensation  Full sensation   RLE Motor Response Responds to command   RLE Sensation  Full sensation   LLE Motor Response Responds to command   LLE Sensation  Full sensation   Cough Reflex Present   [place] Coma Scale   Eye Opening 4   Best Verbal Response 5   Best Motor Response 6   [place] Coma Scale Score 15   HEENT (Head, Ears, Eyes, Nose, & Throat)   HEENT (WDL) X   Right Ear Hearing aid;Impaired hearing   Left Ear Hearing aid;Impaired hearing   Respiratory   Respiratory (WDL) WDL   Respiratory Interventions Cough & deep breathe;H.O.B. elevated   Cardiac   Cardiac (WDL) X   Cardiac Regularity Regular   Pacemaker   Pacemaker Yes   Pacemaker Type Permanent   Pacemaker Location Left chest   Gastrointestinal   Abdominal (WDL) X   Abdomen Inspection Soft;Rounded   Last BM (including prior to admit) 07/24/24   RUQ Bowel Sounds Active   LUQ Bowel Sounds Active   RLQ Bowel Sounds Active   LLQ Bowel Sounds Active   Genitourinary   Genitourinary (WDL) X  (urine incontinence dk yellow)   Suprapubic Tenderness No   Dysuria (Pain/Burning w/Urination) No   Difficulty Urinating/Starting Stream No   Urine Assessment   Urinary  Location Orientation: Inner  Wound Description (Comments): Incontinence Associated Excoriation - Inner Buttocks   Wound Cleansed Soap and water   Dressing/Treatment Pharmaceutical agent (see MAR)   Wound Assessment Pink/red   Drainage Amount None (dry)   Odor None   Aixa-wound Assessment Excoriated   Wound 07/10/24 Groin Incontinence Associated Excoriation - Groin   Date First Assessed: 07/10/24   Primary Wound Type: (c) Other (comment)  Location: Groin  Wound Description (Comments): Incontinence Associated Excoriation - Groin   Wound Cleansed Soap and water   Dressing/Treatment Pharmaceutical agent (see MAR)   Wound Assessment Pink/red   Drainage Amount None (dry)   Odor None   Aixa-wound Assessment Excoriated   Incision 07/08/24 Abdomen Medial;Upper   Date First Assessed/Time First Assessed: 07/08/24 2130   Present on Original Admission: Yes  Location: Abdomen  Incision Location Orientation: Medial;Upper  Incision Description (Comments): 5 surgical stab sites, covered with gauze and tegaderm   Dressing/Treatment Open to air   Margins Approximated   Incision Assessment Dry   Drainage Amount None (dry)   Odor None   Aixa-incision Assessment Intact   Care Plan - Discharge Planning Goals   Discharge to home or other facility with appropriate resources Refer to discharge planning if patient needs post-hospital services based on physician order or complex needs related to functional status, cognitive ability or social support system

## 2024-07-25 NOTE — PROGRESS NOTES
Patient:   Kelsey Morejon  MR#:    [id number]   Room:    [facility]820-   YOB: 1955  Date of Progress Note: 7/24/2024  Time of Note                           7:27 AM  Consulting Physician:   Guy Stubbs M.D.  Attending Physician:  Guy Stubbs MD     Chief complaint Status post cholecystectomy     S:This 69 y.o. 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 Patient admitted on 6/13/2024 with AMS. She was found to have UTI  and left ureteral calculus with obstruction and acute hypoxic respiratory failure. She has been treated with Cefepime. Lithotripsy performed on 6/21/2024. GI was consulted on 6/21/2024 for abdominal pain. GB US showed cholelithiasis with small stones and sludge. HIDA scan done showed Non visualization of the gallbladder and could not rule out cystic duct obstruction.  On 07/03/24 she underwent a CHOLECYSTECTOMY LAPAROSCOPIC WITH INTRAOPERATIVE CHOLANGIOGRAM by   Lexa Waddell MD. She tolerated procedure well.   She does have a wound on her R buttock from cyst removal by Dr. Waddell. The wound has opened up and has been dressed w/ wet-to-dry dressings by Home Health. Wound measures 1.2cm x 2 cm x 2.5. Undermining circumferentially reaching 5cm at 4 o'clock. Subcutaneous tissue, wound bed. Small amount of serosanguineous drainage. Aixa wound area is slight erythema but blanchable.  She was complaining about some difficulty swallowing. Speech evaluated her. She is now tolerating a regular diet, thin liquids with meds whole in applesauce.   She is now medically stable and is participating with therapy. She is ready to begin rehab with goal of returning home after rehab dc with support from her caregivers and family.  Previously syncope after large bowel movement.  Was hypotensive.  Noted to have urinary tract infection.  Fluid bolus was given and medications were adjusted.   Exercises   Static Standing Balance Exercises standing in // bars with wt shifting; static standing timed 2-3 times with 35\" being best; pt able to demonstrate one handed reaching while standing w/o lob   Dynamic Standing Balance Exercises pivot practice in // bars L and R x 2 ea with seated rest between each turn; able to lift R LE but not L during pivot and greater difficulty turning to face R side   Exercise Equipment SS from bed>power chair with EDU for caregiver   Assessment   Assessment Pt relying less on arms for static standing and demonstrated longer standing duration. Cont with flexed knees and difficulty making turns especially to face R side. Caregiver given instruction for SS use to transfer pt but will need further practice and review.   Education   Education Given To Patient;Caregiver   Education Provided Transfer Training   Education Provided Comments use of SS for transfers   Education Method Demonstration;Verbal   Education Outcome Continued education needed  (caregiver with difficulty managing brakes due to choice of shoes and also needs safety cues and reminders for proper operation of parts)   Safety Devices   Type of Devices Left in chair;Call light within reach   PT Individual Minutes   Time In 1300   Time Out 1355   Minutes 55            Electronically signed by Brissa Wood PTA on 7/23/2024 at 3:37 PM                        RECORD REVIEW: Previous medical records, medications were reviewed at today's visit    IMPRESSION:   1.  Status post cholecystectomy-monitor  2.  Gout-allopurinol/colchicine  3.  Coronary artery disease-aspirin/Imdur  4.  Hypertension-on meds monitor  5.  Anemia-on iron  6.  Rheumatoid arthritis-Arava/prednisone  7.  Urinary incontinence-Hiprex  8.  Wound left buttock-wound care  9.  GI-PPI/bowel regimen  10.  Pain control-Percocet as needed/tramadol as needed/Lidoderm patch  11.  Hypothyroidism-Synthroid  12.  Myalgia due to statin  13.  PT/OT     Appreciate

## 2024-07-25 NOTE — PROGRESS NOTES
[facility] Progress Note    Patient:  Kelsey Morejon  YOB: 1955  Date of Service: 7/25/2024  MRN: [id number]   Acct: [id number]   Primary Care Physician: Lexa Solomon MD  Advance Directive: DNR  Admit Date: 7/8/2024       Hospital Day: 17     NOTE: Portions of this note have been copied forward, however, changes made to reflect the most current clinical status of this patient.    CHIEF COMPLAINT:  Medical Management      7/25/2024 7:41 AM  Subjective / Interval History:   07/25/2024  Patient seen and examined this a.m. No new complaints.  No acute changes or acute overnight event reported. Sitting comfortably in chair in no apparent acute distress.  Denies any acute complaints or distress.  Endorses intermittent nonproductive cough.      07/23/2024  No acute changes or acute overnight event reported.  Denies any acute complaints or distress.  Endorses overall improvement.       07/20/2024  Patient seen and examined this a.m. No new complaints.  No acute changes or acute overnight event reported. Laying comfortably in bed in no apparent acute distress.  Denies any acute complaints or distress.  Endorses overall improvement.      Potassium of 5.2 this a.m.    07/17/2024  Patient seen and examined this a.m. No new complaints.  No acute changes or acute overnight event reported. Sitting comfortably in chair in no apparent acute distress.  Denies any acute complaints or distress.  Endorses overall improvement and tolerating rehab       07/14/2024  Patient seen and examined this a.m. No new complaints.  No acute changes or acute overnight event reported. Laying comfortably in bed in no apparent acute distress.  Denies any acute complaints or distress.        07/12/2024  Patient seen and examined this a.m. No new complaints.  No acute changes or acute overnight event reported. Sitting comfortably in bed in no apparent acute distress.  Denies any acute complaints or distress.  Denies any  with drop in systolic blood pressure from 107 to 69. This recovered promptly after assisting patient back to bed. Patient is on several blood pressure medications and diuretics.  She endorses decreased appetite and oral intake.  Based on her clinical picture, she appears intravascularly depleted.  Lab work unremarkable with the exception of magnesium 1.4, BNP 1,285 (most recently 5,126 on 5/24 @ [facility]), troponin 41 with repeat pending, and hemoglobin 8.8.  She does have a history of MAI and is on iron supplementation.  EKG showed NSR without ischemic changes.  Chest x-ray showed no acute cardiopulmonary abnormality. UA is pending. She denies CP, SOB, fever, chills, abd pain, or diarrhea. She has some nausea intermittently. She does have some regurgitation of medications/food at times and has recently had her esophagus stretched; SLP to evaluate    Syncope  Orthostatic hypotension-improved  Serial orthostatic vitals  Resumed cardiac meds: Spironolactone, imdur, coreg  Continue Monitoring     Hyperkalemia-resolved  Potassium of 5.2 (07/20/024)  Sodium zirconium cyclosilicate 5 g p.o. x 1 dose (07/20/2024)  Monitor BMP.    Hypomagnesemia-resolved  Replace as per protocol     Iron deficiency anemia  Continue ferrous sulfate     HFpEF  -Echo 6/13 @ [facility]: EF 61-65% with grade I DD  -Noted trace edema to BLE, BNP 1,100  -Continue holding diuretics for now     Recent esophageal dilation  SLP on board    Coronary artery disease of native artery of native heart with stable angina pectoris (HCC)  s/p PCI to RCA in 2010 and LAD in 2013  Continue optimize medical management     History of pulmonary fibrosis  Bronchodilators as needed  Oxygen supplementation as needed.     History of DVT  Apixaban 5 mg p.o. twice daily.      Hypothyroidism  Levothyroxine 75 mcg p.o. daily       Paraplegia   -Noted  -Previously had been able to pivot and transfer, has not been able to in ~1 month due to weakness  -PT/OT     Neurogenic

## 2024-07-25 NOTE — PROGRESS NOTES
Facility/Department: [facility] 8 REHAB UNIT  Occupational Therapy     Name: Kelsey Morejon  : 1955  MRN: [id number]  Date of Service: 2024    Discharge Recommendations:  Continue to assess pending progress    Patient Diagnosis(es): The primary encounter diagnosis was S/P cholecystectomy. A diagnosis of Acute midline thoracic back pain was also pertinent to this visit.  Past Medical History:  has a past medical history of CAD (coronary artery disease), Cellulitis, History of blood transfusion, History of DVT (deep vein thrombosis), History of neutropenia, History of pulmonary fibrosis, Hx of blood clots, Hypercholesterolemia, Hypertension, Intrinsic asthma, Pacemaker, Post op infection, Rheumatoid arteritis (HCC), Sinus node dysfunction (HCC), Staph aureus infection, Status post placement of implantable loop recorder, Syncope, and Thyroid disease.  Past Surgical History:  has a past surgical history that includes Diagnostic Cardiac Cath Lab Procedure; LEEP; Coronary angioplasty with stent (); Pacemaker insertion (2016); back surgery (2018); pr arthrp kne condyle&platu medial&lat compartments (Right, 2018); pr i&d deep absc bursa/hematoma thigh/knee region (Right, 10/23/2018); Cardiac catheterization (14  MDL); Cardiac catheterization (1/26/15  MDL); pacemaker placement; joint replacement; Revision total knee arthroplasty (Right, 2018); and Cholecystectomy (2024).    Treatment Diagnosis: Acute cholecystitis, s/p cholecystectomy    Assessment   Performance deficits / Impairments: Decreased functional mobility ;Decreased ADL status;Decreased strength;Decreased endurance;Decreased balance;Decreased high-level IADLs  Treatment Diagnosis: Acute cholecystitis, s/p cholecystectomy  Prognosis: Good  Activity Tolerance  Activity Tolerance: Patient Tolerated treatment well        Plan   Occupational Therapy Plan  Specific Instructions for Next Treatment: AE for footwear  Current  15 reps, all planes.    Education  Education Given To: Patient  Education Provided: Mobility Training, Transfer Training, ADL Function  Education Provided Comments: use of SS for transfers  Education Method: Demonstration, Verbal  Barriers to Learning: None  Education Outcome: Verbalized understanding, Demonstrated understanding    Balance  Sitting Balance: Independent  Standing Balance: Moderate assistance (At GB, Min A to Mod A with fatigue.)      Shower/Bathe Self  Assistance Needed: Partial/moderate assistance  Comment: Shower.  CARE Score: 3  Discharge Goal: Partial/moderate assistance    Upper Body Dressing  Assistance Needed: Setup or clean-up assistance  Comment: Supervision for sitting balance.  CARE Score: 5  Discharge Goal: Set-up or clean-up assistance      Lower Body Dressing  Assistance Needed: Substantial/maximal assistance  Comment: Able to don/doff over feet using reacher, requires assistance for standing aspects.  CARE Score: 2  Discharge Goal: Partial/moderate assistance    Goals  Short Term Goals  Time Frame for Short Term Goals: 2 weeks  Short Term Goal 1: Mod A with LB dressing, AE PRN.  Short Term Goal 2: MET  Short Term Goal 3: MET  Short Term Goal 4: Patient will complete toileting with Max A.  Short Term Goal 5: MET  Long Term Goals  Time Frame for Long Term Goals : 3-4 weeks  Long Term Goal 1: MET  Long Term Goal 2: Min A with LB dressing.  Long Term Goal 3: Min A with donning/doffing socks and shoes.  Long Term Goal 4: Min A with bathing hygiene.  Long Term Goal 5: MET  Long Term Goal 6: Mod A with toileting.  Long Term Goal 7: MET  Patient Goals   Patient goals : Return home independently.    Therapy Time   Individual Concurrent Group Co-treatment   Time In 1345         Time Out 1515         Minutes 90         Timed Code Treatment Minutes: 90 Minutes    Electronically signed by MERY Cardenas on 7/25/2024 at 3:48 PM

## 2024-07-25 NOTE — DISCHARGE INSTRUCTIONS
Discharge Instructions      Patient Instructions:   Home  Therapy orders: PT, OT, and Nursing     Discharge lab work: none  Code status: DNR   Activity: activity as tolerated  Diet: ADULT DIET; Regular; No scrambled eggs  ADULT ORAL NUTRITION SUPPLEMENT; Breakfast, Dinner; Low Calorie/High Protein Oral Supplement    Wound Care: as directed  Equipment: as per therapy

## 2024-07-25 NOTE — PLAN OF CARE
Problem: Discharge Planning  Goal: Discharge to home or other facility with appropriate resources  Outcome: Progressing  Flowsheets (Taken 7/25/2024 0815)  Discharge to home or other facility with appropriate resources: Refer to discharge planning if patient needs post-hospital services based on physician order or complex needs related to functional status, cognitive ability or social support system     Problem: Skin/Tissue Integrity  Goal: Absence of new skin breakdown  Description: 1.  Monitor for areas of redness and/or skin breakdown  2.  Assess vascular access sites hourly  3.  Every 4-6 hours minimum:  Change oxygen saturation probe site  4.  Every 4-6 hours:  If on nasal continuous positive airway pressure, respiratory therapy assess nares and determine need for appliance change or resting period.  Outcome: Progressing     Problem: Safety - Adult  Goal: Free from fall injury  Outcome: Progressing     Problem: ABCDS Injury Assessment  Goal: Absence of physical injury  Outcome: Progressing     Problem: Nutrition Deficit:  Goal: Optimize nutritional status  Outcome: Progressing     Problem: Pain  Goal: Verbalizes/displays adequate comfort level or baseline comfort level  Outcome: Progressing

## 2024-07-25 NOTE — PROGRESS NOTES
Physical Therapy  Name: Kelsey Morejon  MRN:  [id number]  Date of service:  7/25/2024 07/25/24 0915   Bed mobility   Rolling to Left Modified independent   Rolling to Right Modified independent   Supine to Sit Modified independent   Bed Mobility Comments oob to L side with time and side rail; multiple rolling for dressing prior to oob   Transfers   Sit to Stand Minimal Assistance;Contact guard assistance  (min@ coming to stand to RW and cg@ to SS)   Bed to Chair Minimal assistance  (stand pivot from bed to power chair (chair to her R) sat prematurely)   Comment SS from power chair to toilet and back to recliner as need for toileting mid session   PT Exercises   Static Standing Balance Exercises standing at stabilized RW with wt shifting; static standing timed 2-3 times with 35\" being best; pt able to demonstrate one handed reaching while standing w/o lob; standing in SS multiple times for brief change and cleanup/butt cream application   Assessment   Assessment Pt cont with difficulty making turns to tranfer with RW. Manages to reach seat with bottom despite insufficiently backing to chair and prematurely sitting.   Safety Devices   Type of Devices Call light within reach;Left in chair   PT Individual Minutes   Time In 0915   Time Out 1000   Minutes 45         Electronically signed by Brissa Wood PTA on 7/25/2024 at 3:37 PM

## 2024-07-25 NOTE — PROGRESS NOTES
Physical Therapy  Name: Kelsey Morejon  MRN:  [id number]  Date of service:  7/25/2024 07/25/24 1300   Restrictions/Precautions   Restrictions/Precautions Fall Risk;Isolation   General   Additional Pertinent Hx RA, pacemaker, CAD, MI, DVT, CHF, neurogenic bladder, HTN, charcot's joint, psoriasis vulgaris, osteoporosis, hypothyroidism, cervical spinal stenosis   Diagnosis Acute cholecystitis   General Comment   Comments in recliner, post lunch   Subjective   Subjective Pt eager to get shower this afternoon. Also eager to return home tomrroow.   Subjective   Subjective denies pain   Transfers   Sit to Stand Contact guard assistance  (pulling up to // bars and SS)   Ambulation   Surface Level tile   Device Parallel Bars   Assistance Minimal assistance   Quality of Gait FFP; sequence with R LE leading (previously had to use L LE lead); flexed B knees with ER; guarding/blocking  R LE with SLS though no actual buckle with wbing on forearms rather than down through hands towards end   Gait Deviations Slow Vania;Decreased step length;Decreased step height   Distance 2 small steps fwd/back; 3 small steps fwd/back   Comments seated rest between distances;pt becomes progressively more flexed carlo with bkwds steps and begins to wb on forearms on // bars rather than down through hands   Propulsion 1   Propulsion Power   Level Level Tile   Method RUE   Level of Assistance Independent   Description/ Details navigates turns, narrow paths, fwd/backwards and varies speed to accomodate setting   Distance 250' x 2   PT Exercises   Exercise Treatment // bars: standing weight shifts and extending R knee while pushing down through UEs to practice advancing L LE (very difficult)   A/AROM Exercises x 20 ADD sets with ball   Resistive Exercises x 20 man resist: hip abd, hip add, hip ext, laq, hip flex   Safety Devices   Type of Devices Left in chair  (OT present upon my exit)   PT Individual Minutes   Time In 1300   Time Out 1345   Minutes  45         Electronically signed by Brissa Wood PTA on 7/25/2024 at 3:32 PM

## 2024-07-25 NOTE — PLAN OF CARE
Problem: Discharge Planning  Goal: Discharge to home or other facility with appropriate resources  7/24/2024 1951 by Malorie Hua RN  Outcome: Progressing  Flowsheets (Taken 7/24/2024 1941)  Discharge to home or other facility with appropriate resources: Refer to discharge planning if patient needs post-hospital services based on physician order or complex needs related to functional status, cognitive ability or social support system  7/24/2024 1402 by Myriam Evans, RN  Outcome: Progressing     Problem: Skin/Tissue Integrity  Goal: Absence of new skin breakdown  Description: 1.  Monitor for areas of redness and/or skin breakdown  2.  Assess vascular access sites hourly  3.  Every 4-6 hours minimum:  Change oxygen saturation probe site  4.  Every 4-6 hours:  If on nasal continuous positive airway pressure, respiratory therapy assess nares and determine need for appliance change or resting period.  7/24/2024 1951 by Malorie Hua RN  Outcome: Progressing  7/24/2024 1402 by Myriam Evans RN  Outcome: Progressing     Problem: Safety - Adult  Goal: Free from fall injury  7/24/2024 1951 by Malorie Hau RN  Outcome: Progressing  7/24/2024 1402 by Myriam Evans, RN  Outcome: Progressing     Problem: ABCDS Injury Assessment  Goal: Absence of physical injury  7/24/2024 1951 by Malorie Hua RN  Outcome: Progressing  7/24/2024 1402 by Myriam Evans RN  Outcome: Progressing     Problem: Nutrition Deficit:  Goal: Optimize nutritional status  7/24/2024 1951 by Malorie Hua RN  Outcome: Progressing  7/24/2024 1402 by Myriam Evans, RN  Outcome: Progressing     Problem: Pain  Goal: Verbalizes/displays adequate comfort level or baseline comfort level  7/24/2024 1951 by Malorie Hua RN  Outcome: Progressing  7/24/2024 1402 by Myriam Evans RN  Outcome: Progressing

## 2024-07-26 ENCOUNTER — TRANSCRIBE ORDERS (OUTPATIENT)
Dept: HOME HEALTH SERVICES | Facility: HOME HEALTHCARE | Age: 69
End: 2024-07-26
Payer: MEDICARE

## 2024-07-26 ENCOUNTER — HOME HEALTH ADMISSION (OUTPATIENT)
Dept: HOME HEALTH SERVICES | Facility: HOME HEALTHCARE | Age: 69
End: 2024-07-26
Payer: MEDICARE

## 2024-07-26 ENCOUNTER — READMISSION MANAGEMENT (OUTPATIENT)
Dept: CALL CENTER | Facility: HOSPITAL | Age: 69
End: 2024-07-26
Payer: MEDICARE

## 2024-07-26 VITALS
OXYGEN SATURATION: 96 % | DIASTOLIC BLOOD PRESSURE: 70 MMHG | TEMPERATURE: 97.5 F | HEART RATE: 78 BPM | RESPIRATION RATE: 16 BRPM | WEIGHT: 197.4 LBS | BODY MASS INDEX: 31.72 KG/M2 | SYSTOLIC BLOOD PRESSURE: 130 MMHG | HEIGHT: 66 IN

## 2024-07-26 DIAGNOSIS — Z48.815 ENCOUNTER FOR SURGICAL AFTERCARE FOLLOWING SURGERY OF DIGESTIVE SYSTEM: Primary | ICD-10-CM

## 2024-07-26 DIAGNOSIS — I25.118 ATHEROSCLEROSIS OF NATIVE CORONARY ARTERY OF NATIVE HEART WITH STABLE ANGINA PECTORIS: Primary | ICD-10-CM

## 2024-07-26 PROCEDURE — 6370000000 HC RX 637 (ALT 250 FOR IP): Performed by: PSYCHIATRY & NEUROLOGY

## 2024-07-26 PROCEDURE — 99232 SBSQ HOSP IP/OBS MODERATE 35: CPT | Performed by: PSYCHIATRY & NEUROLOGY

## 2024-07-26 RX ORDER — M-VIT,TX,IRON,MINS/CALC/FOLIC 27MG-0.4MG
1 TABLET ORAL DAILY
Qty: 30 TABLET | Refills: 0 | Status: SHIPPED | OUTPATIENT
Start: 2024-07-26

## 2024-07-26 RX ORDER — ALLOPURINOL 100 MG/1
100 TABLET ORAL DAILY
Qty: 30 TABLET | Refills: 0 | Status: SHIPPED | OUTPATIENT
Start: 2024-07-26

## 2024-07-26 RX ORDER — LEVOTHYROXINE SODIUM 0.07 MG/1
75 TABLET ORAL DAILY
Qty: 30 TABLET | Refills: 0 | Status: SHIPPED | OUTPATIENT
Start: 2024-07-26

## 2024-07-26 RX ORDER — LIDOCAINE 4 G/G
1 PATCH TOPICAL DAILY
Qty: 30 EACH | Refills: 0 | Status: SHIPPED | OUTPATIENT
Start: 2024-07-26

## 2024-07-26 RX ORDER — FOLIC ACID 1 MG/1
1 TABLET ORAL DAILY
Qty: 30 TABLET | Refills: 0 | Status: SHIPPED | OUTPATIENT
Start: 2024-07-26

## 2024-07-26 RX ORDER — COLCHICINE 0.6 MG/1
0.6 TABLET ORAL DAILY
Qty: 30 TABLET | Refills: 0 | Status: SHIPPED | OUTPATIENT
Start: 2024-07-26

## 2024-07-26 RX ORDER — ASPIRIN 81 MG/1
81 TABLET ORAL DAILY
Qty: 30 TABLET | Refills: 0 | Status: SHIPPED | OUTPATIENT
Start: 2024-07-26

## 2024-07-26 RX ORDER — LOSARTAN POTASSIUM 50 MG/1
50 TABLET ORAL DAILY
Qty: 30 TABLET | Refills: 0 | Status: SHIPPED | OUTPATIENT
Start: 2024-07-26

## 2024-07-26 RX ORDER — CARVEDILOL 25 MG/1
25 TABLET ORAL 2 TIMES DAILY WITH MEALS
Qty: 60 TABLET | Refills: 0 | Status: SHIPPED | OUTPATIENT
Start: 2024-07-26

## 2024-07-26 RX ORDER — PANTOPRAZOLE SODIUM 40 MG/1
40 TABLET, DELAYED RELEASE ORAL DAILY
Qty: 30 TABLET | Refills: 0 | Status: SHIPPED | OUTPATIENT
Start: 2024-07-26

## 2024-07-26 RX ORDER — SPIRONOLACTONE 25 MG/1
25 TABLET ORAL DAILY
Qty: 30 TABLET | Refills: 0 | Status: SHIPPED | OUTPATIENT
Start: 2024-07-26

## 2024-07-26 RX ORDER — DULOXETIN HYDROCHLORIDE 60 MG/1
60 CAPSULE, DELAYED RELEASE ORAL DAILY
Qty: 30 CAPSULE | Refills: 0 | Status: SHIPPED | OUTPATIENT
Start: 2024-07-26

## 2024-07-26 RX ORDER — LEFLUNOMIDE 20 MG/1
20 TABLET ORAL NIGHTLY
Qty: 30 TABLET | Refills: 0 | Status: SHIPPED | OUTPATIENT
Start: 2024-07-26

## 2024-07-26 RX ORDER — ASCORBIC ACID 500 MG
500 TABLET ORAL DAILY
Qty: 30 TABLET | Refills: 0 | Status: SHIPPED | OUTPATIENT
Start: 2024-07-26

## 2024-07-26 RX ORDER — TRAMADOL HYDROCHLORIDE 50 MG/1
50 TABLET ORAL EVERY 12 HOURS PRN
Qty: 60 TABLET | Refills: 0 | Status: SHIPPED | OUTPATIENT
Start: 2024-07-26 | End: 2024-08-25

## 2024-07-26 RX ORDER — METHENAMINE HIPPURATE 1000 MG/1
1 TABLET ORAL 2 TIMES DAILY WITH MEALS
Qty: 60 TABLET | Refills: 0 | Status: SHIPPED | OUTPATIENT
Start: 2024-07-26

## 2024-07-26 RX ORDER — ESTRADIOL 0.1 MG/G
2 CREAM VAGINAL DAILY
Qty: 1 EACH | Refills: 0 | Status: SHIPPED | OUTPATIENT
Start: 2024-07-26

## 2024-07-26 RX ORDER — FERROUS SULFATE 325(65) MG
325 TABLET ORAL
Qty: 30 TABLET | Refills: 0 | Status: SHIPPED | OUTPATIENT
Start: 2024-07-26

## 2024-07-26 RX ORDER — PREDNISONE 5 MG/1
5 TABLET ORAL DAILY
Qty: 30 TABLET | Refills: 0 | Status: SHIPPED | OUTPATIENT
Start: 2024-07-26

## 2024-07-26 RX ORDER — ISOSORBIDE MONONITRATE 60 MG/1
60 TABLET, EXTENDED RELEASE ORAL DAILY
Qty: 30 TABLET | Refills: 0 | Status: SHIPPED | OUTPATIENT
Start: 2024-07-26

## 2024-07-26 RX ADMIN — ALLOPURINOL 100 MG: 100 TABLET ORAL at 08:11

## 2024-07-26 RX ADMIN — COLCHICINE 0.6 MG: 0.6 TABLET, FILM COATED ORAL at 08:11

## 2024-07-26 RX ADMIN — ASPIRIN 81 MG: 81 TABLET, COATED ORAL at 08:11

## 2024-07-26 RX ADMIN — CARVEDILOL 25 MG: 12.5 TABLET, FILM COATED ORAL at 08:11

## 2024-07-26 RX ADMIN — FERROUS SULFATE TAB 325 MG (65 MG ELEMENTAL FE) 325 MG: 325 (65 FE) TAB at 08:11

## 2024-07-26 RX ADMIN — LOSARTAN POTASSIUM 50 MG: 50 TABLET, FILM COATED ORAL at 08:11

## 2024-07-26 RX ADMIN — PANTOPRAZOLE SODIUM 40 MG: 40 TABLET, DELAYED RELEASE ORAL at 08:11

## 2024-07-26 RX ADMIN — FOLIC ACID 1 MG: 1 TABLET ORAL at 08:12

## 2024-07-26 RX ADMIN — APIXABAN 5 MG: 5 TABLET, FILM COATED ORAL at 08:12

## 2024-07-26 RX ADMIN — SPIRONOLACTONE 25 MG: 25 TABLET ORAL at 08:11

## 2024-07-26 RX ADMIN — METHENAMINE HIPPURATE 1 G: 1000 TABLET ORAL at 08:10

## 2024-07-26 RX ADMIN — PREDNISONE 5 MG: 5 TABLET ORAL at 08:11

## 2024-07-26 RX ADMIN — LEVOTHYROXINE SODIUM 75 MCG: 75 TABLET ORAL at 05:42

## 2024-07-26 RX ADMIN — ISOSORBIDE MONONITRATE 60 MG: 30 TABLET, EXTENDED RELEASE ORAL at 08:11

## 2024-07-26 RX ADMIN — DULOXETINE HYDROCHLORIDE 60 MG: 60 CAPSULE, DELAYED RELEASE ORAL at 08:11

## 2024-07-26 RX ADMIN — Medication 500 MG: at 08:11

## 2024-07-26 RX ADMIN — Medication 1 TABLET: at 08:12

## 2024-07-26 NOTE — PROGRESS NOTES
2002    levothyroxine (SYNTHROID) tablet 75 mcg, 75 mcg, Oral, Daily, Guy Stubbs MD, 75 mcg at 07/24/24 0557    lidocaine 4 % external patch 1 patch, 1 patch, TransDERmal, Daily, Guy Stubbs MD, 1 patch at 07/23/24 0733    losartan (COZAAR) tablet 50 mg, 50 mg, Oral, Daily, Guy Stubbs MD, 50 mg at 07/23/24 0733    methenamine (HIPREX) tablet 1 g (Patient Supplied), 1 g, Oral, BID WC, Guy Stubbs MD, 1 g at 07/23/24 1810    therapeutic multivitamin-minerals 1 tablet, 1 tablet, Oral, Daily, Guy Stubbs MD, 1 tablet at 07/23/24 0733    nitroGLYCERIN (NITROSTAT) SL tablet 0.4 mg, 0.4 mg, SubLINGual, Q5 Min PRN, Guy Stubbs MD    pantoprazole (PROTONIX) tablet 40 mg, 40 mg, Oral, Daily, Guy Stubbs MD, 40 mg at 07/23/24 0733    predniSONE (DELTASONE) tablet 5 mg, 5 mg, Oral, Daily, Guy Stubbs MD, 5 mg at 07/23/24 0732    [Held by provider] spironolactone (ALDACTONE) tablet 25 mg, 25 mg, Oral, Daily, Guy Stubbs MD, 25 mg at 07/10/24 0851    traMADol (ULTRAM) tablet 50 mg, 50 mg, Oral, Q12H PRN, Guy Stubbs MD, 50 mg at 07/23/24 2206    vitamin C tablet 500 mg, 500 mg, Oral, Daily, Guy Stubbs MD, 500 mg at 07/23/24 0733    acetaminophen (TYLENOL) tablet 650 mg, 650 mg, Oral, Q4H PRN, Guy Stubbs MD, 650 mg at 07/23/24 0358    polyethylene glycol (GLYCOLAX) packet 17 g, 17 g, Oral, Daily PRN, Guy Stubbs MD    glucose chewable tablet 16 g, 4 tablet, Oral, PRN, Guy Stubbs MD    dextrose bolus 10% 125 mL, 125 mL, IntraVENous, PRN **OR** dextrose bolus 10% 250 mL, 250 mL, IntraVENous, PRN, Guy Stubbs MD    glucagon injection 1 mg, 1 mg, SubCUTAneous, PRN, Guy Stubbs MD    dextrose 10 % infusion, , IntraVENous, Continuous PRN, Guy Stubbs MD    isosorbide mononitrate (IMDUR) extended release tablet 60 mg, 60 mg, Oral, Daily, Guy Stubbs MD, 60 mg at 07/23/24 0733    miconazole (MICOTIN) 2 % powder, , Topical, BID, Guy Stubbs MD, Given at    Component Value Date    WBC 6.2 07/23/2024    HGB 7.3 (L) 07/23/2024    HCT 25.8 (L) 07/23/2024    MCV 96.6 07/23/2024     07/23/2024     Lab Results   Component Value Date     07/23/2024    K 4.3 07/23/2024     07/23/2024    CO2 22 07/23/2024    BUN 17 07/23/2024    CREATININE 0.9 07/23/2024    GLUCOSE 94 07/23/2024    CALCIUM 8.5 (L) 07/23/2024    BILITOT 0.3 07/23/2024    ALKPHOS 82 07/23/2024    AST 25 07/23/2024    ALT 23 07/23/2024    LABGLOM 68 07/23/2024    GFRAA >59 11/01/2021     Lab Results   Component Value Date    INR 1.50 (H) 10/28/2021    INR 1.20 (H) 12/05/2018    INR 1.14 09/04/2018    PROTIME 18.2 (H) 10/28/2021    PROTIME 15.2 (H) 12/05/2018    PROTIME 14.5 09/04/2018              Brissa Wood PTA  Physical Therapist Assistant  Physical Therapy     Progress Notes      Cosign Needed     Date of Service: 7/23/2024  3:37 PM     Cosign Needed         Physical Therapy  Name: Kelsey Morejon  MRN:  [id number]  Date of service:  7/23/2024 07/23/24 1300   Restrictions/Precautions   Restrictions/Precautions Fall Risk;Isolation   General   Additional Pertinent Hx RA, pacemaker, CAD, MI, DVT, CHF, neurogenic bladder, HTN, charcot's joint, psoriasis vulgaris, osteoporosis, hypothyroidism, cervical spinal stenosis   Diagnosis Acute cholecystitis   General Comment   Comments in bed, needing pants on   Subjective   Subjective Pt agreable and apologetic for not having pants on.   Subjective   Subjective denies pain   Bed mobility   Rolling to Left Modified independent   Rolling to Right Modified independent   Supine to Sit Modified independent   Bed Mobility Comments oob to L side with time and side rail; multiple rolling for dressing prior to oob   Wheelchair Activities   Propulsion Yes   Propulsion 1   Propulsion Power   Level Level Tile   Method RUE   Level of Assistance Independent   Distance 250' x 2   PT Exercises   Static Standing Balance Exercises standing in // bars with

## 2024-07-26 NOTE — PLAN OF CARE
Problem: Safety - Adult  Goal: Free from fall injury  7/25/2024 1935 by Slick Virgen, RN  Outcome: Progressing  7/25/2024 1249 by Marycarmen Mott, RN  Outcome: Progressing

## 2024-07-26 NOTE — OUTREACH NOTE
Prep Survey      Flowsheet Row Responses   Alevism facility patient discharged from? Non-BH   Is LACE score < 7 ? Non-BH Discharge   Eligibility [facility]   Date of Admission 07/08/24   Date of Discharge 07/26/24   Discharge Disposition Home-Health Care [facility]   Discharge diagnosis S/P cholecystectomy (Primary Dx),  Acute midline thoracic back pain   Does the patient have one of the following disease processes/diagnoses(primary or secondary)? Other   Does the patient have Home health ordered? Yes   Is there a DME ordered? No   Prep survey completed? Yes            Rosalina MCGEE - Registered Nurse

## 2024-07-26 NOTE — PLAN OF CARE
Problem: Discharge Planning  Goal: Discharge to home or other facility with appropriate resources  Outcome: Adequate for Discharge  Flowsheets (Taken 7/26/2024 9883)  Discharge to home or other facility with appropriate resources: Refer to discharge planning if patient needs post-hospital services based on physician order or complex needs related to functional status, cognitive ability or social support system     Problem: Skin/Tissue Integrity  Goal: Absence of new skin breakdown  Description: 1.  Monitor for areas of redness and/or skin breakdown  2.  Assess vascular access sites hourly  3.  Every 4-6 hours minimum:  Change oxygen saturation probe site  4.  Every 4-6 hours:  If on nasal continuous positive airway pressure, respiratory therapy assess nares and determine need for appliance change or resting period.  Outcome: Adequate for Discharge     Problem: Safety - Adult  Goal: Free from fall injury  Outcome: Adequate for Discharge     Problem: ABCDS Injury Assessment  Goal: Absence of physical injury  Outcome: Adequate for Discharge     Problem: Nutrition Deficit:  Goal: Optimize nutritional status  Outcome: Adequate for Discharge     Problem: Pain  Goal: Verbalizes/displays adequate comfort level or baseline comfort level  Outcome: Adequate for Discharge

## 2024-07-26 NOTE — PLAN OF CARE
Problem: Discharge Planning  Goal: Discharge to home or other facility with appropriate resources  7/26/2024 1051 by Marycarmen Mott RN  Outcome: Adequate for Discharge  7/26/2024 1007 by Marycarmen Mott RN  Outcome: Adequate for Discharge  Flowsheets (Taken 7/26/2024 0815)  Discharge to home or other facility with appropriate resources: Refer to discharge planning if patient needs post-hospital services based on physician order or complex needs related to functional status, cognitive ability or social support system     Problem: Skin/Tissue Integrity  Goal: Absence of new skin breakdown  Description: 1.  Monitor for areas of redness and/or skin breakdown  2.  Assess vascular access sites hourly  3.  Every 4-6 hours minimum:  Change oxygen saturation probe site  4.  Every 4-6 hours:  If on nasal continuous positive airway pressure, respiratory therapy assess nares and determine need for appliance change or resting period.  7/26/2024 1051 by Marycarmen Mott RN  Outcome: Adequate for Discharge  7/26/2024 1007 by Marycarmen Mott RN  Outcome: Adequate for Discharge     Problem: Safety - Adult  Goal: Free from fall injury  7/26/2024 1051 by Marycarmen Mott RN  Outcome: Adequate for Discharge  7/26/2024 1007 by Marycarmen Mott RN  Outcome: Adequate for Discharge     Problem: ABCDS Injury Assessment  Goal: Absence of physical injury  7/26/2024 1051 by Marycarmen Mott RN  Outcome: Adequate for Discharge  7/26/2024 1007 by Marycarmen Mott RN  Outcome: Adequate for Discharge     Problem: Nutrition Deficit:  Goal: Optimize nutritional status  7/26/2024 1051 by Marycarmen Mott RN  Outcome: Adequate for Discharge  7/26/2024 1007 by Marycarmen Mott RN  Outcome: Adequate for Discharge     Problem: Pain  Goal: Verbalizes/displays adequate comfort level or baseline comfort level  7/26/2024 1051 by Marycarmen Mott RN  Outcome: Adequate for Discharge  7/26/2024 1007 by Marycarmen Mott RN  Outcome: Adequate for Discharge

## 2024-07-26 NOTE — CARE COORDINATION
Ms Morejon, eager to return home with attendant caregivers.  Resuming care with [facility] - nurse, PT and OT services.

## 2024-07-27 ENCOUNTER — HOME CARE VISIT (OUTPATIENT)
Dept: HOME HEALTH SERVICES | Facility: CLINIC | Age: 69
End: 2024-07-27
Payer: MEDICARE

## 2024-07-27 VITALS
DIASTOLIC BLOOD PRESSURE: 80 MMHG | OXYGEN SATURATION: 94 % | RESPIRATION RATE: 16 BRPM | HEART RATE: 81 BPM | SYSTOLIC BLOOD PRESSURE: 130 MMHG | TEMPERATURE: 98.4 F

## 2024-07-27 PROCEDURE — G0299 HHS/HOSPICE OF RN EA 15 MIN: HCPCS

## 2024-07-28 NOTE — CASE COMMUNICATION
"Please route to Derrick Jacobs MD  SOC Note: Pt at home after extended hospital stay.  She had her gallbladder out and had a cyst removed from her right upper buttock, which currently packed with optical ag rope and covered with border dressing.  Gallbladder incisions are covered in steristrips.    Home Health ordered for: disciplines SN, PT, OT    Reason for Hosp/Primary Dx/Co-morbidities: UTI, sepsis, altered mental status    Focus o f Care: abdomen and right upper buttock wounds    Patient's goal(s):\"get stronger so I can walk\"    Current Functional status/mobility/DME: currently does not ambulate, has walker     HB status/Living Arrangements: lives alone with 24 hr caregivers    Skin Integrity/wound status: abdominal wounds with steristrips, right upper buttock wound with packing    Code Status: DNR    Fall Risk/Safety concerns: high fall risk    Educated on Emerg ency Plan, steps to take prior to going to the ER and when to Call [facility]:  Instructed pt on  agency number to call and when to call MD     Medication issues/Concerns:no    Additional Problems/Concerns: no    SDOH Barriers (i.e. caregiver concerns, social isolation, transportation, food insecurity, environment, income etc.)/Need for MSW: no    Plan for next visit: wound care and assessment, med education, fall prevention"

## 2024-07-28 NOTE — HOME HEALTH
"SOC Note: Pt at home after extended hospital stay.  She had her gallbladder out and had a cyst removed from her right upper buttock, which currently packed with optical ag rope and covered with border dressing.  Gallbladder incisions are covered in steristrips.    Home Health ordered for: disciplines SN, PT, OT    Reason for Hosp/Primary Dx/Co-morbidities: UTI, sepsis, altered mental status    Focus of Care: abdomen and right upper buttock wounds    Patient's goal(s):\"get stronger so I can walk\"    Current Functional status/mobility/DME: currently does not ambulate, has walker     HB status/Living Arrangements: lives alone with 24 hr caregivers    Skin Integrity/wound status: abdominal wounds with steristrips, right upper buttock wound with packing    Code Status: DNR    Fall Risk/Safety concerns: high fall risk    Educated on Emergency Plan, steps to take prior to going to the ER and when to Call [facility]:  Instructed pt on  agency number to call and when to call MD     Medication issues/Concerns:no    Additional Problems/Concerns: no    SDOH Barriers (i.e. caregiver concerns, social isolation, transportation, food insecurity, environment, income etc.)/Need for MSW: no    Plan for next visit: wound care and assessment, med education, fall prevention"

## 2024-07-29 ENCOUNTER — TRANSITIONAL CARE MANAGEMENT TELEPHONE ENCOUNTER (OUTPATIENT)
Dept: CALL CENTER | Facility: HOSPITAL | Age: 69
End: 2024-07-29
Payer: MEDICARE

## 2024-07-29 ENCOUNTER — OFFICE VISIT (OUTPATIENT)
Dept: WOUND CARE | Facility: HOSPITAL | Age: 69
End: 2024-07-29
Payer: MEDICARE

## 2024-07-29 ENCOUNTER — LAB REQUISITION (OUTPATIENT)
Dept: LAB | Facility: HOSPITAL | Age: 69
End: 2024-07-29
Payer: MEDICARE

## 2024-07-29 ENCOUNTER — TELEPHONE (OUTPATIENT)
Dept: INTERNAL MEDICINE | Facility: CLINIC | Age: 69
End: 2024-07-29
Payer: MEDICARE

## 2024-07-29 DIAGNOSIS — Z99.3 DEPENDENCE ON WHEELCHAIR: ICD-10-CM

## 2024-07-29 DIAGNOSIS — A49.9 BACTERIAL INFECTION, UNSPECIFIED: ICD-10-CM

## 2024-07-29 DIAGNOSIS — Z48.817 ENCOUNTER FOR SURGICAL AFTERCARE FOLLOWING SURGERY ON THE SKIN AND SUBCUTANEOUS TISSUE: ICD-10-CM

## 2024-07-29 DIAGNOSIS — G82.20 PARAPLEGIA, UNSPECIFIED: ICD-10-CM

## 2024-07-29 DIAGNOSIS — A49.9 BACTERIAL INFECTION, UNSPECIFIED: Primary | ICD-10-CM

## 2024-07-29 PROCEDURE — 87075 CULTR BACTERIA EXCEPT BLOOD: CPT

## 2024-07-29 PROCEDURE — G0463 HOSPITAL OUTPT CLINIC VISIT: HCPCS

## 2024-07-29 PROCEDURE — 87070 CULTURE OTHR SPECIMN AEROBIC: CPT

## 2024-07-29 PROCEDURE — 87176 TISSUE HOMOGENIZATION CULTR: CPT

## 2024-07-29 PROCEDURE — 87205 SMEAR GRAM STAIN: CPT

## 2024-07-29 NOTE — OUTREACH NOTE
Call Center TCM Note      Flowsheet Row Responses   [facility] facility patient discharged from? Non-  [[facility]]   Does the patient have one of the following disease processes/diagnoses(primary or secondary)? Other   TCM attempt successful? No   Unsuccessful attempts Attempt 2            Radha Perez RN    7/29/2024, 14:48 CDT

## 2024-07-29 NOTE — DISCHARGE SUMMARY
Occupational Therapy Discharge Summary         Date: 2024  Patient Name: Kelsey Morejon        MRN: [id number]    : 1955  (69 y.o.)  Gender: female      Diagnosis: Acute cholecystitis  Restrictions/Precautions  Restrictions/Precautions: Fall Risk, Isolation      Discharge Date: 24      UE Functioning:  WFLs    Home Management:  Functional Mobility  Functional - Mobility Device: Wheelchair (Electric.)  Activity: Other  Assist Level: Supervision  Functional Mobility Comments: In pt. room.    Adaptive Equipment/DME Status:  Bathroom Equipment: Grab bars around toilet, Grab bars in shower, 3-in-1 commode, Shower chair (with handles)  Home Equipment: Slide board, Wheelchair - Electric, Wheelchair - Manual, Walker - Rolling, Lift chair  Rec sit to stand device, pt declines need at this time    Pain Assessment:          Remaining Problems:  Decreased functional mobility ;Decreased ADL status;Decreased strength;Decreased endurance;Decreased balance;Decreased high-level IADLs     STGs:  Short Term Goals  Time Frame for Short Term Goals: 2 weeks  Short Term Goal 1: Mod A with LB dressing, AE PRN.  Short Term Goal 2: Mod A with donning/doffing socks and shoes, AE PRN.  Short Term Goal 3: Mod A with bathing hygiene.  Short Term Goal 4: Patient will complete toileting with Max A.  Short Term Goal 5: Patient will complete toilet transfers with Max A.  Met 4/5 STGs  LTGs:  Long Term Goals  Time Frame for Long Term Goals : 3-4 weeks  Long Term Goal 1: Setup with UB dressing.  Long Term Goal 2: Min A with LB dressing.  Long Term Goal 3: Min A with donning/doffing socks and shoes.  Long Term Goal 4: Min A with bathing hygiene.  Long Term Goal 5: Patient verbalize DME needs.  Long Term Goal 6: Mod A with toileting.  Long Term Goal 7: Mod A with toilet transfers.  Met 3/7 LTGs    Discharge Setting and Recommendations:  Home with 24 hour caregiver assist. Home Health Occupational Therapy to address remaining 
  Physical Therapy DISCHARGE Note  DATE:  2024  NAME:  Kelsey Morejon  :  1955  (69 y.o.,female)  MRN:  [id number]    HEIGHT:  Height: 167.6 cm (5' 6\")  WEIGHT:  Weight - Scale: 89.5 kg (197 lb 6.4 oz)    PATIENT DIAGNOSIS(ES):    Diagnosis: Acute cholecystitis    Additional Pertinent Hx: RA, pacemaker, CAD, MI, DVT, CHF, neurogenic bladder, HTN, charcot's joint, psoriasis vulgaris, osteoporosis, hypothyroidism, cervical spinal stenosis  RESTRICTIONS/PRECAUTIONS:    Restrictions/Precautions  Restrictions/Precautions: Fall Risk, Isolation     OVERALL  ORIENTATION STATUS:     PAIN:  Pain Level: 0  Pain Type: Chronic pain    Pain Location: Foot     Pain Orientation: Right, Left      GROSS ASSESSMENT        POSTURE/BALANCE  Balance  Sitting - Static: Good       ACTIVITY TOLERANCE  Activity Tolerance  Activity Tolerance: Patient tolerated treatment well, Patient limited by endurance      BED MOBILITY  Bed mobility  Bridging: Minimal assistance (held feet stable  x 10)  Rolling to Left: Modified independent  Rolling to Right: Modified independent  Supine to Sit: Modified independent  Sit to Supine: Moderate assistance  Scooting: Stand by assistance (On EOB)  Bed Mobility Comments: oob to L side with time and side rail; multiple rolling for dressing prior to oob        TRANSFERS  Transfers  Sit to Stand: Contact guard assistance (pulling up to // bars and SS)  Stand to Sit: Contact guard assistance  Bed to Chair: Minimal assistance (stand pivot from bed to power chair (chair to her R) sat prematurely)    AMBULATION 1  Ambulation  Surface: Level tile  Device: Parallel Bars  Assistance: Minimal assistance  Quality of Gait: FFP; sequence with R LE leading (previously had to use L LE lead); flexed B knees with ER; guarding/blocking  R LE with SLS though no actual buckle with wbing on forearms rather than down through hands towards end  Gait Deviations: Slow Vania, Decreased step length, Decreased step 
Neurology Discharge Summary     Patient Identification:  Kelsey Morejon is a 69 y.o. female.  :  1955  Admit Date:  2024  Discharge date : 2024   Attending Provider: Guy Stubbs MD     Account Number: [id number]                                   Admission Diagnoses:   S/P cholecystectomy [Z90.49]    Discharge Diagnoses:  Principal Problem:    S/P cholecystectomy  Active Problems:    Coronary artery disease of native artery of native heart with stable angina pectoris (HCC)    Rheumatoid arteritis (HCC)    History of pulmonary fibrosis    History of DVT (deep vein thrombosis)    Thyroid disease    Lumbar stenosis with neurogenic claudication    Closed wedge compression fracture of T2 vertebra with routine healing    S/P kyphoplasty    Paraplegia (HCC)    Urinary incontinence  Resolved Problems:    * No resolved hospital problems. *      Discharge Medications:    Current Discharge Medication List             Details   miconazole (MICOTIN) 2 % powder Apply topically 2 times daily.  Qty: 45 g, Refills: 0                Details   traMADol (ULTRAM) 50 MG tablet Take 1 tablet by mouth every 12 hours as needed for Pain for up to 30 days. Max Daily Amount: 100 mg  Qty: 60 tablet, Refills: 0    Comments: Reduce doses taken as pain becomes manageable  Associated Diagnoses: S/P cholecystectomy; Acute midline thoracic back pain      aspirin 81 MG EC tablet Take 1 tablet by mouth daily  Qty: 30 tablet, Refills: 0      leflunomide (ARAVA) 20 MG tablet Take 1 tablet by mouth at bedtime  Qty: 30 tablet, Refills: 0      isosorbide mononitrate (IMDUR) 60 MG extended release tablet Take 1 tablet by mouth daily  Qty: 30 tablet, Refills: 0      apixaban (ELIQUIS) 5 MG TABS tablet Take 1 tablet by mouth 2 times daily  Qty: 60 tablet, Refills: 0      DULoxetine (CYMBALTA) 60 MG extended release capsule Take 1 capsule by mouth daily  Qty: 30 capsule, Refills: 0      losartan (COZAAR) 50 MG tablet Take 1 tablet by 
Statement Selected

## 2024-07-29 NOTE — OUTREACH NOTE
Call Center TCM Note      Flowsheet Row Responses   [facility] facility patient discharged from? Non-  [[facility]]   Does the patient have one of the following disease processes/diagnoses(primary or secondary)? Other   TCM attempt successful? No   Unsuccessful attempts Attempt 1            Radha Perez RN    7/29/2024, 12:26 CDT

## 2024-07-29 NOTE — TELEPHONE ENCOUNTER
Patient called stating she was at wound care and had a BP of 80/40    Per Dr. Oliveira hold aldactone and hydrate.   If she believes she is becoming sick again go to ER

## 2024-07-30 ENCOUNTER — HOME CARE VISIT (OUTPATIENT)
Dept: HOME HEALTH SERVICES | Facility: CLINIC | Age: 69
End: 2024-07-30
Payer: MEDICARE

## 2024-07-30 ENCOUNTER — TRANSITIONAL CARE MANAGEMENT TELEPHONE ENCOUNTER (OUTPATIENT)
Dept: CALL CENTER | Facility: HOSPITAL | Age: 69
End: 2024-07-30
Payer: MEDICARE

## 2024-07-30 PROCEDURE — G0300 HHS/HOSPICE OF LPN EA 15 MIN: HCPCS

## 2024-07-30 NOTE — OUTREACH NOTE
Call Center TCM Note      Flowsheet Row Responses   [facility] patient discharged from? Non-[facility]   Does the patient have one of the following disease processes/diagnoses(primary or secondary)? Other   TCM attempt successful? No  [[facility]]   Unsuccessful attempts Attempt 3            Lacy John RN    7/30/2024, 12:10 CDT

## 2024-07-31 VITALS
SYSTOLIC BLOOD PRESSURE: 124 MMHG | TEMPERATURE: 97.7 F | RESPIRATION RATE: 16 BRPM | OXYGEN SATURATION: 93 % | DIASTOLIC BLOOD PRESSURE: 66 MMHG | HEART RATE: 95 BPM

## 2024-08-01 ENCOUNTER — OFFICE VISIT (OUTPATIENT)
Dept: SURGERY | Facility: CLINIC | Age: 69
End: 2024-08-01
Payer: MEDICARE

## 2024-08-01 ENCOUNTER — HOME CARE VISIT (OUTPATIENT)
Dept: HOME HEALTH SERVICES | Facility: CLINIC | Age: 69
End: 2024-08-01
Payer: MEDICARE

## 2024-08-01 VITALS
SYSTOLIC BLOOD PRESSURE: 108 MMHG | OXYGEN SATURATION: 90 % | TEMPERATURE: 96.7 F | RESPIRATION RATE: 18 BRPM | HEART RATE: 80 BPM | DIASTOLIC BLOOD PRESSURE: 56 MMHG

## 2024-08-01 VITALS
OXYGEN SATURATION: 89 % | BODY MASS INDEX: 33.59 KG/M2 | HEIGHT: 66 IN | SYSTOLIC BLOOD PRESSURE: 97 MMHG | WEIGHT: 209 LBS | HEART RATE: 75 BPM | DIASTOLIC BLOOD PRESSURE: 47 MMHG

## 2024-08-01 DIAGNOSIS — K80.10 CHRONIC CALCULOUS CHOLECYSTITIS: Primary | ICD-10-CM

## 2024-08-01 DIAGNOSIS — S31.819S BUTTOCK WOUND, RIGHT, SEQUELA: ICD-10-CM

## 2024-08-01 PROCEDURE — G0300 HHS/HOSPICE OF LPN EA 15 MIN: HCPCS

## 2024-08-01 NOTE — PROGRESS NOTES
OFFICE FOLLOW UP VISIT    Referring Provider: No ref. provider found  Primary Care Provider: Moises Oliveira MD    Chief Complaint   Patient presents with    Post-op Follow-up       Subjective .     4 weeks status post laparoscopic cholecystectomy with intraoperative cholangiogram.  The patient reports that her nausea has improved and she is better tolerating food.  Still having some reflux in the morning.  No abdominal pain.    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    Elevated cholesterol     Eustachian tube dysfunction     Heart disease     Herpes simplex     History of transfusion     Hyperlipidemia     Hypertension     Hypothyroidism 12/23/2020    Intrinsic asthma 12/23/2020    Knee dislocation     Labral tear of right hip joint     Laryngitis sicca     Laryngitis, chronic     Left carotid bruit 03/09/2016    MI (myocardial infarction)     Myalgia due to statin 06/25/2019    Open wound of right hip 09/14/2021    Osteomyelitis of right femur 07/06/2021    Otorrhea     Pacemaker 11/17/2016    Primary osteoarthritis of left knee 12/23/2020    Psoriasis vulgaris 12/23/2020    S/P coronary artery stent placement 03/09/2016    Sensorineural hearing loss     Seropositive rheumatoid arthritis of multiple sites 12/27/2019    Overview:  -myochrysine '93-'96 -methotrexate '96--->11/98;r/s  restarted 2/99--> 8/14 (anemia) -sulfasalazine- not effective -penicillamine 6/98-->10/98; no effect  "-leflunomide 11/98--> - Humira '13-->didn't take - Enbrel 12/14-->3/15- no effect!   Last Assessment & Plan:  - \"aching all over\" because she had to be off her anti-rheumatic drugs for 2 weeks in preparation for her R knee surgery - he    Sick sinus syndrome 12/27/2019    Sjogren's disease     Spondylolisthesis of lumbar region 01/17/2018    Syncope, recurrent 02/08/2021    Urinary tract infection     UTI (urinary tract infection) 04/19/2024   ,   Past Surgical History:   Procedure Laterality Date    A-V CARDIAC PACEMAKER INSERTION  2016    ATRIAL CARDIAC PACEMAKER INSERTION      CARDIAC CATHETERIZATION      CATARACT EXTRACTION      CERVICAL CORPECTOMY N/A 3/3/2021    Procedure: CERVICAL 6 CORPECTOMY WITH TITANIUM CAGE WITH NEURO MONITORING;  Surgeon: Bandar Shea MD;  Location: [facility] OR;  Service: Neurosurgery;  Laterality: N/A;    CHOLECYSTECTOMY WITH INTRAOPERATIVE CHOLANGIOGRAM N/A 7/3/2024    Procedure: CHOLECYSTECTOMY LAPAROSCOPIC WITH INTRAOPERATIVE CHOLANGIOGRAM;  Surgeon: Moises Keyes MD;  Location: [facility] OR;  Service: General;  Laterality: N/A;    COLONOSCOPY  11/08/2011    One fold in the ascending colon which showed ulcer otherwise normal exam    COLONOSCOPY  11/12/2004    Normal exam repeat in five years    CORONARY ANGIOPLASTY WITH STENT PLACEMENT      X 2; 2013 & 2014    ENDOSCOPY  07/10/2014    Normal exam    ENDOSCOPY N/A 5/30/2024    Procedure: ESOPHAGOGASTRODUODENOSCOPY WITH ANESTHESIA;  Surgeon: Taiwo Sanchez MD;  Location: [facility] ENDOSCOPY;  Service: Gastroenterology;  Laterality: N/A;  preop; dysphagia  postop: balloon dilation  PCP Jesus Manuel jeff    FLAP LEG Right 9/14/2021    Procedure: RIGHT GLUTEAL FASCIOCUTANEOUS ADVANCEMENT FLAP AND RIGHT TENSOR FASCIAL JESSICA FLAP;  Surgeon: Amadeo Turner MD;  Location: [facility] OR;  Service: Plastics;  Laterality: Right;    HIP ABDUCTION TENOTOMY BILATERAL Right 1/14/2021    Procedure: RIGHT HIP GLUTEUS MEDLUS / MINIMUS REPAIR, POSSIBLE " ACHILLES ALLOGRAFT;  Surgeon: Nino Carlson MD;  Location:  [facility] OR;  Service: Orthopedics;  Laterality: Right;    INCISION AND DRAINAGE ABSCESS Right 6/4/2022    Procedure: INCISION AND DRAINAGE ABSCESS right hip;  Surgeon: Magda Salcido MD;  Location:  [facility] OR;  Service: General;  Laterality: Right;    INCISION AND DRAINAGE ABSCESS Right 6/10/2022    Procedure: RIGHT HIP INCISION AND DRAINAGE. MD NEEDS 3L VANC IRRIGATION, CURRETTES, DAICANS, KERLEX ROLLS;  Surgeon: Amadeo Turner MD;  Location:  [facility] OR;  Service: Plastics;  Laterality: Right;    INCISION AND DRAINAGE HIP Right 2/9/2021    Procedure: HIP INCISION AND DRAINAGE;  Surgeon: Nino Carlson MD;  Location:  [facility] OR;  Service: Orthopedics;  Laterality: Right;    INCISION AND DRAINAGE LEG Right 10/24/2021    Procedure: INCISION AND DRAINAGE LOWER EXTREMITY;  Surgeon: Amadeo Turner MD;  Location:  [facility] OR;  Service: Plastics;  Laterality: Right;    INCISION AND DRAINAGE OF WOUND Right 7/8/2021    Procedure: INCISION AND DRAINAGE WOUND RIGHT HIP;  Surgeon: James Huntley MD;  Location:  [facility] OR;  Service: Orthopedics;  Laterality: Right;    JOINT REPLACEMENT      KYPHOPLASTY WITH BIOPSY Bilateral 10/26/2021    Procedure: THOARCIC 12 KYPHOPLASTY WITH BIOPSY;  Surgeon: Bandar Shea MD;  Location:  [facility] OR;  Service: Neurosurgery;  Laterality: Bilateral;    LEG DEBRIDEMENT Right 9/14/2021    Procedure: DEBRIDEMENT OF RIGHT HIP WOUND, RIGHT GLUTEAL FASCIOCUTANEOUS ADVANCEMENT FLAP AND RIGHT TENSOR FASCIAL JESSICA FLAP;  Surgeon: Amadeo Turner MD;  Location:  [facility] OR;  Service: Plastics;  Laterality: Right;    LUMBAR DISCECTOMY Right 3/23/2021    Procedure: LUMBAR DISCECTOMY MICRO, Lumbar 1/2 right;  Surgeon: Bandar Shea MD;  Location:  [facility] OR;  Service: Neurosurgery;  Laterality: Right;    LUMBAR FUSION N/A 1/19/2018    Procedure: L3-4,L4-5 DECOMPRESSION, POSTERIOR SPINAL FUSION WITH INSTRUMENTATION;  Surgeon:  Fortino Oropeza MD;  Location:  [facility] OR;  Service:     LUMBAR LAMINECTOMY WITH FUSION Left 1/17/2018    Procedure: LEFT L3-4 L4-5 LATERAL LUMBAR INTERBODY FUSION;  Surgeon: Fortino Oropeza MD;  Location:  [facility] OR;  Service:     MASS EXCISION Right 4/23/2024    Procedure: RIGHT BUTTOCK MASS EXCISION;  Surgeon: Moises Keyes MD;  Location:  [facility] OR;  Service: General;  Laterality: Right;    MYRINGOTOMY W/ TUBES  09/04/2014    TUBES NO LONGER IN PLACE    OTHER SURGICAL HISTORY      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 so hardware was removed and spacers were placed    REPLACEMENT TOTAL KNEE Right     URETEROSCOPY LASER LITHOTRIPSY WITH STENT INSERTION N/A 6/21/2024    Procedure: URETEROSCOPY LASER LITHOTRIPSY WITH STENT INSERTION LEFT;  Surgeon: Ky Plascencia MD;  Location:  [facility] OR;  Service: Urology;  Laterality: N/A;   ,   Family History   Problem Relation Age of Onset    Cancer Mother         Lung cancer    Heart disease Father         Doied of heart. Attack   ,   Social History     Tobacco Use    Smoking status: Never     Passive exposure: Never    Smokeless tobacco: Never   Vaping Use    Vaping status: Never Used   Substance Use Topics    Alcohol use: No    Drug use: Never       Current Outpatient Medications:     acetaminophen (TYLENOL) 325 MG tablet, Take 2 tablets by mouth Every 6 (Six) Hours As Needed for Mild Pain (mild pain). Indications: Fever, Pain, Disp: , Rfl:     allopurinol (ZYLOPRIM) 100 MG tablet, Take 1 tablet by mouth Daily., Disp: 30 tablet, Rfl: 0    apixaban (Eliquis) 5 MG tablet tablet, Take 1 tablet by mouth 2 (Two) Times a Day., Disp: , Rfl:     Ascorbic Acid (Vitamin C) 500 MG capsule, Take 1 tablet by mouth Daily. Indications: Inadequate Vitamin C, Disp: , Rfl:     aspirin 81 MG EC tablet, Take 1 tablet by mouth Daily. Indications: Buildup of Plaques in Large Arteries Leading to the Brain, Disp: , Rfl:     carvedilol (COREG) 25 MG tablet, Take 1 tablet by mouth 2 (Two) Times a Day  With Meals. Indications: High Blood Pressure Disorder, Disp: , Rfl:     colchicine 0.6 MG tablet, Take 1 tablet by mouth Daily., Disp: 30 tablet, Rfl: 0    Diclofenac Sodium (VOLTAREN) 1 % gel gel, Apply 4 g topically to the appropriate area as directed 4 (Four) Times a Day As Needed (Mild pain). Indications: Joint Damage causing Pain and Loss of Function, Disp: , Rfl:     DULoxetine (CYMBALTA) 60 MG capsule, Take 1 capsule by mouth Daily. Indications: Musculoskeletal Pain, Disp: , Rfl:     estradiol (ESTRACE) 0.1 MG/GM vaginal cream, Insert 2 g into the vagina Daily., Disp: 42.5 g, Rfl: 0    ferrous sulfate 324 (65 Fe) MG tablet delayed-release EC tablet, Take 1 tablet by mouth Daily With Breakfast. Indications: Iron Deficiency, Disp: , Rfl:     folic acid (FOLVITE) 1 MG tablet, Take 1 tablet by mouth Daily. Indications: Anemia From Inadequate Folic Acid, Disp: , Rfl:     furosemide (LASIX) 40 MG tablet, Take 1 tablet by mouth Daily. Indications: Cardiac Failure, Edema, Disp: , Rfl:     isosorbide mononitrate (IMDUR) 60 MG 24 hr tablet, Take 1 tablet by mouth Daily. Indications: Stable Angina Pectoris, Disp: , Rfl:     leflunomide (ARAVA) 20 MG tablet, Take 1 tablet by mouth every night at bedtime. Indications: Rheumatoid Arthritis, Disp: 90 tablet, Rfl: 3    levothyroxine (SYNTHROID, LEVOTHROID) 75 MCG tablet, Take 1 tablet by mouth Daily. Indications: Underactive Thyroid, Disp: , Rfl:     lidocaine (Lidoderm) 5 %, Place 1 patch on the skin as directed by provider Daily As Needed for Mild Pain. Remove & Discard patch within 12 hours or as directed by MD, Disp: 30 each, Rfl: 2    losartan (COZAAR) 50 MG tablet, Take 1 tablet by mouth Daily. Indications: High Blood Pressure Disorder, Disp: , Rfl:     methenamine (HIPREX) 1 g tablet, Take 1 tablet by mouth 2 (Two) Times a Day With Meals. Indications: Urinary Tract Infection, Disp: , Rfl:     multivitamin with minerals tablet tablet, Take 1 tablet by mouth Daily.,  "Disp:  , Rfl:     nitroglycerin (NITROSTAT) 0.4 MG SL tablet, Place 1 tablet under the tongue Every 5 (Five) Minutes As Needed for Chest Pain. Take no more than 3 doses in 15 minutes.  Indications: Acute Angina Pectoris, Disp: , Rfl:     pantoprazole (Protonix) 40 MG EC tablet, Take 1 tablet by mouth Daily for 90 days., Disp: 90 tablet, Rfl: 0    predniSONE (DELTASONE) 5 MG tablet, Take 1 tablet by mouth Daily. Indications: Acute Joint Inflammation in Gout, Disp: , Rfl:     spironolactone (ALDACTONE) 25 MG tablet, Take 1 tablet by mouth Daily., Disp: 30 tablet, Rfl: 11    traMADol (ULTRAM) 50 MG tablet, Take 1 tablet by mouth Every 12 (Twelve) Hours As Needed for Moderate Pain. Indications: Pain, Disp: , Rfl:     Allergies:  Atorvastatin, Amoxicillin, Escitalopram, Nabumetone, Niacin er, Penicillin g, Penicillins, and Simvastatin      The following portions of the patient's history were reviewed and updated as appropriate: allergies, current medications, past family history, past medical history, past social history, past surgical history, and problem list.      Review of Systems  A comprehensive 14 point review of systems was performed and is negative unless otherwise noted      Objective   BP 97/47   Pulse 75   Ht 167.6 cm (66\")   Wt 94.8 kg (209 lb)   LMP  (LMP Unknown)   SpO2 (!) 89%   BMI 33.73 kg/m²     BMI followup discussion/instruction with patient: none (medical contraindication)      Physical Exam  Elderly female, well-appearing.  Alert and conversant.  Obese abdomen, incisions are well-healed.  Right buttock with 1.5 cm wound with clean healthy edges without drainage and packing in place.      Labs:  I personally reviewed all pertinent labs.   Imaging: I personally reviewed all pertinent imaging studies.   Pathology: Chronic calculus cholecystitis    Assessment & Plan   Diagnoses and all orders for this visit:    1. Chronic calculous cholecystitis (Primary)    2. Buttock wound, right, " sequela      Pathology from cholecystectomy was benign.  The patient is exhibiting some improvement with resolution of nausea and better food tolerance.  Continue wound care clinic for right buttock wound, it is healing and should resolve with daily wound care.  As long as she is doing well, follow-up as needed.       Moises Keyse MD, FACS  General Surgery  8/1/2024  11:19 CDT    Please note that portions of this note were completed with a voice recognition program.

## 2024-08-02 ENCOUNTER — OFFICE VISIT (OUTPATIENT)
Dept: INTERNAL MEDICINE | Facility: CLINIC | Age: 69
End: 2024-08-02
Payer: MEDICARE

## 2024-08-02 VITALS
HEART RATE: 81 BPM | DIASTOLIC BLOOD PRESSURE: 62 MMHG | TEMPERATURE: 96.7 F | SYSTOLIC BLOOD PRESSURE: 112 MMHG | OXYGEN SATURATION: 93 %

## 2024-08-02 DIAGNOSIS — S31.819S BUTTOCK WOUND, RIGHT, SEQUELA: ICD-10-CM

## 2024-08-02 DIAGNOSIS — I50.32 CHRONIC HEART FAILURE WITH PRESERVED EJECTION FRACTION: ICD-10-CM

## 2024-08-02 DIAGNOSIS — I25.10 CORONARY ARTERY DISEASE INVOLVING NATIVE CORONARY ARTERY OF NATIVE HEART WITHOUT ANGINA PECTORIS: ICD-10-CM

## 2024-08-02 DIAGNOSIS — N18.32 STAGE 3B CHRONIC KIDNEY DISEASE: ICD-10-CM

## 2024-08-02 DIAGNOSIS — K80.10 CHRONIC CALCULOUS CHOLECYSTITIS: Primary | ICD-10-CM

## 2024-08-02 DIAGNOSIS — N20.1 URETERAL CALCULUS: ICD-10-CM

## 2024-08-02 DIAGNOSIS — D64.9 ANEMIA, UNSPECIFIED TYPE: ICD-10-CM

## 2024-08-02 LAB
BACTERIA SPEC AEROBE CULT: NORMAL
GRAM STN SPEC: NORMAL
GRAM STN SPEC: NORMAL

## 2024-08-02 RX ORDER — VALACYCLOVIR HYDROCHLORIDE 500 MG/1
500 TABLET, FILM COATED ORAL DAILY
COMMUNITY
Start: 2024-07-26

## 2024-08-02 NOTE — PROGRESS NOTES
"        Subjective     Chief Complaint:  Hospital follow-up    HPI:  Patient presents today for hospital follow-up. Please see assessment and plan below.    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    Elevated cholesterol     Eustachian tube dysfunction     Heart disease     Herpes simplex     History of transfusion     Hyperlipidemia     Hypertension     Hypothyroidism 12/23/2020    Intrinsic asthma 12/23/2020    Knee dislocation     Labral tear of right hip joint     Laryngitis sicca     Laryngitis, chronic     Left carotid bruit 03/09/2016    MI (myocardial infarction)     Myalgia due to statin 06/25/2019    Open wound of right hip 09/14/2021    Osteomyelitis of right femur 07/06/2021    Otorrhea     Pacemaker 11/17/2016    Primary osteoarthritis of left knee 12/23/2020    Psoriasis vulgaris 12/23/2020    S/P coronary artery stent placement 03/09/2016    Sensorineural hearing loss     Seropositive rheumatoid arthritis of multiple sites 12/27/2019    Overview:  -myochrysine '93-'96 -methotrexate '96--->11/98;r/s  restarted 2/99--> 8/14 (anemia) -sulfasalazine- not effective -penicillamine 6/98-->10/98; no effect -leflunomide 11/98--> - Humira '13-->didn't take - Enbrel 12/14-->3/15- no effect!   Last Assessment & Plan:  - \"aching all over\" because she had to be off her anti-rheumatic drugs for 2 weeks in preparation for her R knee surgery - he    Sick sinus syndrome " 12/27/2019    Sjogren's disease     Spondylolisthesis of lumbar region 01/17/2018    Syncope, recurrent 02/08/2021    Urinary tract infection     UTI (urinary tract infection) 04/19/2024     Past Surgical History:  Past Surgical History:   Procedure Laterality Date    A-V CARDIAC PACEMAKER INSERTION  2016    ATRIAL CARDIAC PACEMAKER INSERTION      CARDIAC CATHETERIZATION      CATARACT EXTRACTION      CERVICAL CORPECTOMY N/A 3/3/2021    Procedure: CERVICAL 6 CORPECTOMY WITH TITANIUM CAGE WITH NEURO MONITORING;  Surgeon: Bandar Shea MD;  Location:  [facility] OR;  Service: Neurosurgery;  Laterality: N/A;    CHOLECYSTECTOMY WITH INTRAOPERATIVE CHOLANGIOGRAM N/A 7/3/2024    Procedure: CHOLECYSTECTOMY LAPAROSCOPIC WITH INTRAOPERATIVE CHOLANGIOGRAM;  Surgeon: Moises Keyes MD;  Location:  [facility] OR;  Service: General;  Laterality: N/A;    COLONOSCOPY  11/08/2011    One fold in the ascending colon which showed ulcer otherwise normal exam    COLONOSCOPY  11/12/2004    Normal exam repeat in five years    CORONARY ANGIOPLASTY WITH STENT PLACEMENT      X 2; 2013 & 2014    ENDOSCOPY  07/10/2014    Normal exam    ENDOSCOPY N/A 5/30/2024    Procedure: ESOPHAGOGASTRODUODENOSCOPY WITH ANESTHESIA;  Surgeon: Taiwo Sanchez MD;  Location: [facility] ENDOSCOPY;  Service: Gastroenterology;  Laterality: N/A;  preop; dysphagia  postop: balloon dilation  PCP Jesus Manuel jeff    FLAP LEG Right 9/14/2021    Procedure: RIGHT GLUTEAL FASCIOCUTANEOUS ADVANCEMENT FLAP AND RIGHT TENSOR FASCIAL JESSICA FLAP;  Surgeon: Amadeo Turner MD;  Location: [facility] OR;  Service: Plastics;  Laterality: Right;    HIP ABDUCTION TENOTOMY BILATERAL Right 1/14/2021    Procedure: RIGHT HIP GLUTEUS MEDLUS / MINIMUS REPAIR, POSSIBLE ACHILLES ALLOGRAFT;  Surgeon: Nino Carlson MD;  Location: [facility] OR;  Service: Orthopedics;  Laterality: Right;    INCISION AND DRAINAGE ABSCESS Right 6/4/2022    Procedure: INCISION AND DRAINAGE ABSCESS right hip;  Surgeon: Omari  Magda BECKMAN MD;  Location:  [facility] OR;  Service: General;  Laterality: Right;    INCISION AND DRAINAGE ABSCESS Right 6/10/2022    Procedure: RIGHT HIP INCISION AND DRAINAGE. MD NEEDS 3L VANC IRRIGATION, CURRLORIN, DAICANS, KERLEX ROLLS;  Surgeon: Amadeo Turner MD;  Location:  [facility] OR;  Service: Plastics;  Laterality: Right;    INCISION AND DRAINAGE HIP Right 2/9/2021    Procedure: HIP INCISION AND DRAINAGE;  Surgeon: Nino Carlson MD;  Location:  [facility] OR;  Service: Orthopedics;  Laterality: Right;    INCISION AND DRAINAGE LEG Right 10/24/2021    Procedure: INCISION AND DRAINAGE LOWER EXTREMITY;  Surgeon: Amadeo Turner MD;  Location:  [facility] OR;  Service: Plastics;  Laterality: Right;    INCISION AND DRAINAGE OF WOUND Right 7/8/2021    Procedure: INCISION AND DRAINAGE WOUND RIGHT HIP;  Surgeon: James Huntley MD;  Location:  [facility] OR;  Service: Orthopedics;  Laterality: Right;    JOINT REPLACEMENT      KYPHOPLASTY WITH BIOPSY Bilateral 10/26/2021    Procedure: THOARCIC 12 KYPHOPLASTY WITH BIOPSY;  Surgeon: Bandar Shea MD;  Location:  [facility] OR;  Service: Neurosurgery;  Laterality: Bilateral;    LEG DEBRIDEMENT Right 9/14/2021    Procedure: DEBRIDEMENT OF RIGHT HIP WOUND, RIGHT GLUTEAL FASCIOCUTANEOUS ADVANCEMENT FLAP AND RIGHT TENSOR FASCIAL JESSICA FLAP;  Surgeon: Amadeo Turner MD;  Location:  [facility] OR;  Service: Plastics;  Laterality: Right;    LUMBAR DISCECTOMY Right 3/23/2021    Procedure: LUMBAR DISCECTOMY MICRO, Lumbar 1/2 right;  Surgeon: Bandar Shea MD;  Location:  [facility] OR;  Service: Neurosurgery;  Laterality: Right;    LUMBAR FUSION N/A 1/19/2018    Procedure: L3-4,L4-5 DECOMPRESSION, POSTERIOR SPINAL FUSION WITH INSTRUMENTATION;  Surgeon: Fortino Oropeza MD;  Location:  [facility] OR;  Service:     LUMBAR LAMINECTOMY WITH FUSION Left 1/17/2018    Procedure: LEFT L3-4 L4-5 LATERAL LUMBAR INTERBODY FUSION;  Surgeon: Fortino Oropeza MD;  Location:  [facility] OR;  Service:      MASS EXCISION Right 4/23/2024    Procedure: RIGHT BUTTOCK MASS EXCISION;  Surgeon: Moises Keyes MD;  Location:  [facility] OR;  Service: General;  Laterality: Right;    MYRINGOTOMY W/ TUBES  09/04/2014    TUBES NO LONGER IN PLACE    OTHER SURGICAL HISTORY      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 so hardware was removed and spacers were placed    REPLACEMENT TOTAL KNEE Right     URETEROSCOPY LASER LITHOTRIPSY WITH STENT INSERTION N/A 6/21/2024    Procedure: URETEROSCOPY LASER LITHOTRIPSY WITH STENT INSERTION LEFT;  Surgeon: Ky Plascencia MD;  Location:  [facility] OR;  Service: Urology;  Laterality: N/A;       Allergies:  Allergies   Allergen Reactions    Atorvastatin Other (See Comments)     LEG CRAMPS      Amoxicillin Rash    Escitalopram Rash    Nabumetone Rash    Niacin Er Rash    Penicillin G Rash    Penicillins Rash    Simvastatin Rash     Medications:  Prior to Admission medications    Medication Sig Start Date End Date Taking? Authorizing Provider   acetaminophen (TYLENOL) 325 MG tablet Take 2 tablets by mouth Every 6 (Six) Hours As Needed for Mild Pain (mild pain). Indications: Fever, Pain 6/4/22   Leila Daly MD   allopurinol (ZYLOPRIM) 100 MG tablet Take 1 tablet by mouth Daily. 7/9/24   Gus Feldman MD   apixaban (Eliquis) 5 MG tablet tablet Take 1 tablet by mouth 2 (Two) Times a Day. 4/23/24   Nayan Hale DO   Ascorbic Acid (Vitamin C) 500 MG capsule Take 1 tablet by mouth Daily. Indications: Inadequate Vitamin C 5/16/24   Leila Daly MD   aspirin 81 MG EC tablet Take 1 tablet by mouth Daily. Indications: Buildup of Plaques in Large Arteries Leading to the Brain 6/1/24   Leila Daly MD   carvedilol (COREG) 25 MG tablet Take 1 tablet by mouth 2 (Two) Times a Day With Meals. Indications: High Blood Pressure Disorder 4/21/24   Leila Daly MD   colchicine 0.6 MG tablet Take 1 tablet by mouth Daily. 7/9/24   Gus Feldman MD   Diclofenac Sodium (VOLTAREN) 1 %  gel gel Apply 4 g topically to the appropriate area as directed 4 (Four) Times a Day As Needed (Mild pain). Indications: Joint Damage causing Pain and Loss of Function 11/4/21   Leila Daly MD   DULoxetine (CYMBALTA) 60 MG capsule Take 1 capsule by mouth Daily. Indications: Musculoskeletal Pain 4/21/24   Leila Daly MD   estradiol (ESTRACE) 0.1 MG/GM vaginal cream Insert 2 g into the vagina Daily. 5/31/24   Kris Cade MD   ferrous sulfate 324 (65 Fe) MG tablet delayed-release EC tablet Take 1 tablet by mouth Daily With Breakfast. Indications: Iron Deficiency 4/21/24   Leila Daly MD   folic acid (FOLVITE) 1 MG tablet Take 1 tablet by mouth Daily. Indications: Anemia From Inadequate Folic Acid 4/21/24   Lelia Daly MD   furosemide (LASIX) 40 MG tablet Take 1 tablet by mouth Daily. Indications: Cardiac Failure, Edema 5/25/24   Leila Dlay MD   isosorbide mononitrate (IMDUR) 60 MG 24 hr tablet Take 1 tablet by mouth Daily. Indications: Stable Angina Pectoris 4/21/24   Leila Daly MD   leflunomide (ARAVA) 20 MG tablet Take 1 tablet by mouth every night at bedtime. Indications: Rheumatoid Arthritis 2/6/23   Moises Oliveira MD   levothyroxine (SYNTHROID, LEVOTHROID) 75 MCG tablet Take 1 tablet by mouth Daily. Indications: Underactive Thyroid 5/5/24   Leila Daly MD   lidocaine (Lidoderm) 5 % Place 1 patch on the skin as directed by provider Daily As Needed for Mild Pain. Remove & Discard patch within 12 hours or as directed by MD 4/9/24   Niki Palma APRN   losartan (COZAAR) 50 MG tablet Take 1 tablet by mouth Daily. Indications: High Blood Pressure Disorder 4/21/24   Leila Daly MD   methenamine (HIPREX) 1 g tablet Take 1 tablet by mouth 2 (Two) Times a Day With Meals. Indications: Urinary Tract Infection 6/13/24   Leila Daly MD   multivitamin with minerals tablet tablet Take 1 tablet by mouth Daily.  3/6/21   Taiwo Prado APRN   nitroglycerin (NITROSTAT) 0.4 MG SL tablet Place 1 tablet under the tongue Every 5 (Five) Minutes As Needed for Chest Pain. Take no more than 3 doses in 15 minutes.  Indications: Acute Angina Pectoris 4/21/24   Leila Daly MD   pantoprazole (Protonix) 40 MG EC tablet Take 1 tablet by mouth Daily for 90 days. 5/31/24 8/29/24  Kris Cade MD   predniSONE (DELTASONE) 5 MG tablet Take 1 tablet by mouth Daily. Indications: Acute Joint Inflammation in Gout 4/21/24   Leila Daly MD   spironolactone (ALDACTONE) 25 MG tablet Take 1 tablet by mouth Daily. 6/6/24   Anamaria White APRN   traMADol (ULTRAM) 50 MG tablet Take 1 tablet by mouth Every 12 (Twelve) Hours As Needed for Moderate Pain. Indications: Pain 5/16/24   Leila Daly MD       Objective     Vital Signs: /62 (BP Location: Left arm, Patient Position: Sitting, Cuff Size: Adult)   Pulse 81   Temp 96.7 °F (35.9 °C) (Temporal)   LMP  (LMP Unknown)   SpO2 93%   Physical Exam  Vitals and nursing note reviewed.   Constitutional:       General: She is not in acute distress.     Appearance: Normal appearance. She is obese.   Cardiovascular:      Rate and Rhythm: Normal rate and regular rhythm.      Pulses: Normal pulses.      Heart sounds: Normal heart sounds. No murmur heard.  Pulmonary:      Effort: Pulmonary effort is normal. No respiratory distress.      Breath sounds: Normal breath sounds. No wheezing.   Musculoskeletal:         General: Normal range of motion.   Skin:     General: Skin is warm and dry.      Findings: No bruising, erythema or rash.   Neurological:      Mental Status: She is alert and oriented to person, place, and time. Mental status is at baseline.      Motor: No weakness.      Gait: Gait abnormal (motorized chair in use).       Results Reviewed:  CBC and CMP obtained on 7/25 were stable.    Right buttock anaerobic culture of tissue showing no anaerobes isolated at 3  days.    Tissue/bone culture of right buttock showing no growth at 3 days.    Assessment / Plan     Assessment/Plan:  Diagnoses and all orders for this visit:    1. Chronic calculous cholecystitis (Primary)    2. Buttock wound, right, sequela    3. Anemia, unspecified type  -     CBC & Differential    4. Stage 3b chronic kidney disease  -     Cancel: Basic metabolic panel  -     Comprehensive Metabolic Panel    5. Ureteral calculus    6. Chronic heart failure with preserved ejection fraction    7. Coronary artery disease involving native coronary artery of native heart without angina pectoris       Patient presents today for hospital follow-up.  She was admitted to [facility] from 6/13-7/8. She was also admitted to [facility] from 7/8-7/26.  Please see hospital courses below per Dr. Feldman and Dr. Jacobs.            Patient reports that she has been doing well since being discharged from rehab.  Her main complaint is pain secondary to her wound.  She states that it is worse when she sits in a hard chair.  She has a chair in her living room that provides her some relief.  She saw DANIELA Zhang with wound care on 7/29.  They recommended 1 week follow-up.    She followed up with Dr. Keyes yesterday.  He assessed her abdominal incisions and right buttock wound, both of which are healing.  He recommended following up as needed.    She had several follow-up appointments scheduled in July that were canceled due to being in rehab.  We will assist with rescheduling cardiology and urology appointments.    Labs were last obtained on 7/25.  We will repeat CBC and CMP today.    It is difficult to determine how often she routinely follows with Dr. Oliveira due to several recent admissions.  For now we will have her return in 3 months or sooner if needed.    Return in about 3 months (around 11/2/2024) for with Dr. Oliveira. unless patient needs to be seen sooner or acute issues arise.    I have discussed the patient  results/orders and and plan/recommendation with them at today's visit.      Anamaria Lyons, DANIELA   08/02/2024

## 2024-08-03 LAB
ALBUMIN SERPL-MCNC: 3 G/DL (ref 3.5–5.2)
ALBUMIN/GLOB SERPL: 1 G/DL
ALP SERPL-CCNC: 115 U/L (ref 39–117)
ALT SERPL-CCNC: 35 U/L (ref 1–33)
AST SERPL-CCNC: 43 U/L (ref 1–32)
BASOPHILS # BLD AUTO: ABNORMAL 10*3/UL
BILIRUB SERPL-MCNC: 0.4 MG/DL (ref 0–1.2)
BUN SERPL-MCNC: 21 MG/DL (ref 8–23)
BUN/CREAT SERPL: 12.8 (ref 7–25)
CALCIUM SERPL-MCNC: 8.9 MG/DL (ref 8.6–10.5)
CHLORIDE SERPL-SCNC: 106 MMOL/L (ref 98–107)
CO2 SERPL-SCNC: 23 MMOL/L (ref 22–29)
CREAT SERPL-MCNC: 1.64 MG/DL (ref 0.57–1)
DIFFERENTIAL COMMENT: ABNORMAL
EGFRCR SERPLBLD CKD-EPI 2021: 33.3 ML/MIN/1.73
EOSINOPHIL # BLD AUTO: ABNORMAL 10*3/UL
EOSINOPHIL # BLD MANUAL: 0.4 10*3/MM3 (ref 0–0.4)
EOSINOPHIL NFR BLD AUTO: ABNORMAL %
EOSINOPHIL NFR BLD MANUAL: 8.5 % (ref 0.3–6.2)
ERYTHROCYTE [DISTWIDTH] IN BLOOD BY AUTOMATED COUNT: 22 % (ref 12.3–15.4)
GLOBULIN SER CALC-MCNC: 3 GM/DL
GLUCOSE SERPL-MCNC: 117 MG/DL (ref 65–99)
HCT VFR BLD AUTO: 29 % (ref 34–46.6)
HGB BLD-MCNC: 8.2 G/DL (ref 12–15.9)
LYMPHOCYTES # BLD AUTO: ABNORMAL 10*3/UL
LYMPHOCYTES # BLD MANUAL: 1.49 10*3/MM3 (ref 0.7–3.1)
LYMPHOCYTES NFR BLD AUTO: ABNORMAL %
LYMPHOCYTES NFR BLD MANUAL: 31.9 % (ref 19.6–45.3)
MCH RBC QN AUTO: 28.5 PG (ref 26.6–33)
MCHC RBC AUTO-ENTMCNC: 28.3 G/DL (ref 31.5–35.7)
MCV RBC AUTO: 100.7 FL (ref 79–97)
MONOCYTES # BLD MANUAL: 0.55 10*3/MM3 (ref 0.1–0.9)
MONOCYTES NFR BLD AUTO: ABNORMAL %
MONOCYTES NFR BLD MANUAL: 11.7 % (ref 5–12)
NEUTROPHILS # BLD MANUAL: 2.24 10*3/MM3 (ref 1.7–7)
NEUTROPHILS NFR BLD AUTO: ABNORMAL %
NEUTROPHILS NFR BLD MANUAL: 31.9 % (ref 42.7–76)
PLATELET # BLD AUTO: 177 10*3/MM3 (ref 140–450)
PLATELET BLD QL SMEAR: ABNORMAL
POTASSIUM SERPL-SCNC: 4.2 MMOL/L (ref 3.5–5.2)
PROT SERPL-MCNC: 6 G/DL (ref 6–8.5)
RBC # BLD AUTO: 2.88 10*6/MM3 (ref 3.77–5.28)
RBC MORPH BLD: ABNORMAL
SODIUM SERPL-SCNC: 141 MMOL/L (ref 136–145)
WBC # BLD AUTO: 4.68 10*3/MM3 (ref 3.4–10.8)

## 2024-08-04 LAB — BACTERIA SPEC ANAEROBE CULT: NORMAL

## 2024-08-05 ENCOUNTER — HOME CARE VISIT (OUTPATIENT)
Dept: HOME HEALTH SERVICES | Facility: CLINIC | Age: 69
End: 2024-08-05
Payer: MEDICARE

## 2024-08-05 ENCOUNTER — TELEPHONE (OUTPATIENT)
Dept: UROLOGY | Facility: CLINIC | Age: 69
End: 2024-08-05
Payer: MEDICARE

## 2024-08-05 VITALS
TEMPERATURE: 97.3 F | OXYGEN SATURATION: 94 % | DIASTOLIC BLOOD PRESSURE: 62 MMHG | HEART RATE: 74 BPM | RESPIRATION RATE: 14 BRPM | SYSTOLIC BLOOD PRESSURE: 102 MMHG

## 2024-08-05 PROCEDURE — G0299 HHS/HOSPICE OF RN EA 15 MIN: HCPCS

## 2024-08-05 PROCEDURE — G0152 HHCP-SERV OF OT,EA 15 MIN: HCPCS

## 2024-08-05 PROCEDURE — G0151 HHCP-SERV OF PT,EA 15 MIN: HCPCS

## 2024-08-05 NOTE — TELEPHONE ENCOUNTER
Called patient and rescheduled her hospital follow-up with Carey Gilbert for Wednesday, 8/14/24 at 3:15pm.  Patient verbally agreed to this new appointment date/time.

## 2024-08-05 NOTE — TELEPHONE ENCOUNTER
----- Message from Doris NIELSON sent at 8/2/2024  2:32 PM CDT -----  Patient was in hospital and needs follow up appointment scheduled with Cardiology and urology. If you all could please reach out to patient and get her scheduled for her needed visits.  ----- Message -----  From: Anamaria Lyons APRN  Sent: 8/2/2024  12:19 PM CDT  To: Favian Mason [facility]    This patient needs some help with making appointments from her recent 6 week admission. She needs a follow up with cardiology and urology. Ideally as soon as possible because they had recommended follow up in the middle of July but she was in rehab at [facility] at that time.     Also need to see if she has a follow up scheduled with wound care. They had recommended one week follow up which would be on 8/5. This may already be scheduled but I can't see it in Epic.    I'm sorry and thank you!

## 2024-08-06 VITALS
HEART RATE: 74 BPM | RESPIRATION RATE: 17 BRPM | DIASTOLIC BLOOD PRESSURE: 64 MMHG | OXYGEN SATURATION: 94 % | SYSTOLIC BLOOD PRESSURE: 102 MMHG | TEMPERATURE: 97.3 F

## 2024-08-06 VITALS
SYSTOLIC BLOOD PRESSURE: 98 MMHG | DIASTOLIC BLOOD PRESSURE: 58 MMHG | RESPIRATION RATE: 18 BRPM | OXYGEN SATURATION: 91 % | HEART RATE: 71 BPM

## 2024-08-06 DIAGNOSIS — N18.32 STAGE 3B CHRONIC KIDNEY DISEASE: Primary | ICD-10-CM

## 2024-08-07 NOTE — PROGRESS NOTES
Subjective    Ms. Shin is 69 y.o. female    Chief Complaint: Follow-up urge incontinence, nocturnal enuresis    History of Present Illness    69-year-old female established patient in for follow-up regarding urge incontinence, nocturnal enuresis.patient of Dr. Plascencia was started on Gemtesa last November.  Reports medication had been working until the samples ran out and patient was unable to afford the medication therefore has been without any overactive bladder medications and is requiring depends usage 24/7 and is back to waking up wet.  Patient with a history of neurogenic bladder from a previous spinal cord injury from a fall for which has required previous long-term indwelling catheters during rehabilitation and episodes of intermittent catheterizations in between times many years.  Patient is currently not on catheter dependent.  Patient is wheelchair dependent unable to ambulate.    Caffeine intake not excessive.    The following portions of the patient's history were reviewed and updated as appropriate: allergies, current medications, past family history, past medical history, past social history, past surgical history and problem list.    Review of Systems   Constitutional:  Negative for chills and fever.   Gastrointestinal:  Negative for abdominal pain, anal bleeding, blood in stool, nausea and vomiting.   Genitourinary:  Positive for frequency and urgency. Negative for dysuria and hematuria.         Current Outpatient Medications:     acetaminophen (TYLENOL) 325 MG tablet, Take 2 tablets by mouth Every 6 (Six) Hours As Needed for Mild Pain (mild pain). Indications: Fever, Pain, Disp: , Rfl:     allopurinol (ZYLOPRIM) 100 MG tablet, Take 1 tablet by mouth Daily., Disp: 30 tablet, Rfl: 0    apixaban (Eliquis) 5 MG tablet tablet, Take 1 tablet by mouth 2 (Two) Times a Day., Disp: , Rfl:     Ascorbic Acid (Vitamin C) 500 MG capsule, Take 1 tablet by mouth Daily. Indications: Inadequate Vitamin C, Disp: ,  Rfl:     aspirin 81 MG EC tablet, Take 1 tablet by mouth Daily. Indications: Buildup of Plaques in Large Arteries Leading to the Brain, Disp: , Rfl:     carvedilol (COREG) 25 MG tablet, Take 1 tablet by mouth 2 (Two) Times a Day With Meals. Indications: High Blood Pressure Disorder, Disp: , Rfl:     colchicine 0.6 MG tablet, Take 1 tablet by mouth Daily., Disp: , Rfl:     Diclofenac Sodium (VOLTAREN) 1 % gel gel, Apply 4 g topically to the appropriate area as directed 4 (Four) Times a Day As Needed (Mild pain). Indications: Joint Damage causing Pain and Loss of Function, Disp: , Rfl:     DULoxetine (CYMBALTA) 60 MG capsule, Take 1 capsule by mouth Daily. Indications: Musculoskeletal Pain, Disp: , Rfl:     estradiol (ESTRACE) 0.1 MG/GM vaginal cream, Insert 2 g into the vagina Daily., Disp: 42.5 g, Rfl: 0    ferrous sulfate 324 (65 Fe) MG tablet delayed-release EC tablet, Take 1 tablet by mouth Daily With Breakfast. Indications: Iron Deficiency, Disp: , Rfl:     folic acid (FOLVITE) 1 MG tablet, Take 1 tablet by mouth Daily. Indications: Anemia From Inadequate Folic Acid, Disp: , Rfl:     furosemide (LASIX) 40 MG tablet, Take 1 tablet by mouth Daily. Indications: Cardiac Failure, Edema, Disp: , Rfl:     isosorbide mononitrate (IMDUR) 60 MG 24 hr tablet, Take 1 tablet by mouth Daily. Indications: Stable Angina Pectoris, Disp: , Rfl:     leflunomide (ARAVA) 20 MG tablet, Take 1 tablet by mouth every night at bedtime. Indications: Rheumatoid Arthritis, Disp: 90 tablet, Rfl: 3    levothyroxine (SYNTHROID, LEVOTHROID) 75 MCG tablet, Take 1 tablet by mouth Daily. Indications: Underactive Thyroid, Disp: , Rfl:     lidocaine (Lidoderm) 5 %, Place 1 patch on the skin as directed by provider Daily As Needed for Mild Pain. Remove & Discard patch within 12 hours or as directed by MD, Disp: 30 each, Rfl: 2    losartan (COZAAR) 50 MG tablet, Take 1 tablet by mouth Daily. Indications: High Blood Pressure Disorder, Disp: , Rfl:      methenamine (HIPREX) 1 g tablet, Take 1 tablet by mouth 2 (Two) Times a Day With Meals. Indications: Urinary Tract Infection, Disp: , Rfl:     miconazole (MICOTIN) 2 % powder, Apply 1 Application topically to the appropriate area as directed As Needed., Disp: , Rfl:     multivitamin with minerals tablet tablet, Take 1 tablet by mouth Daily., Disp:  , Rfl:     nitroglycerin (NITROSTAT) 0.4 MG SL tablet, Place 1 tablet under the tongue Every 5 (Five) Minutes As Needed for Chest Pain. Take no more than 3 doses in 15 minutes.  Indications: Acute Angina Pectoris, Disp: , Rfl:     pantoprazole (Protonix) 40 MG EC tablet, Take 1 tablet by mouth Daily for 90 days., Disp: 90 tablet, Rfl: 0    predniSONE (DELTASONE) 5 MG tablet, Take 1 tablet by mouth Daily. Indications: Acute Joint Inflammation in Gout, Disp: , Rfl:     spironolactone (ALDACTONE) 25 MG tablet, Take 1 tablet by mouth Daily., Disp: 30 tablet, Rfl: 11    traMADol (ULTRAM) 50 MG tablet, Take 1 tablet by mouth Every 12 (Twelve) Hours As Needed for Moderate Pain. Indications: Pain, Disp: , Rfl:     valACYclovir (VALTREX) 500 MG tablet, Take 1 tablet by mouth Daily., Disp: , Rfl:     Evolocumab (REPATHA) solution prefilled syringe injection, Inject 1 mL under the skin into the appropriate area as directed Every 14 (Fourteen) Days., Disp: 2 mL, Rfl: 11    oxybutynin XL (Ditropan XL) 10 MG 24 hr tablet, Take 1 tablet by mouth Daily., Disp: 30 tablet, Rfl: 11    Past Medical History:   Diagnosis Date    Age-related osteoporosis with current pathological fracture 05/27/2020    Arthritis     Asthma     Bilateral bunions 12/23/2020    Cancer     Cardiac pacemaker syndrome 12/23/2020    Overview:  - heart block - implanted 11/16    Charcot's joint of foot, left 12/23/2020    Chronic deep vein thrombosis (DVT) of right lower extremity 06/23/2021    Chronic pain syndrome 06/22/2021    Chronic sinusitis     COPD (chronic obstructive pulmonary disease)     Coronary artery  "disease     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    Elevated cholesterol     Eustachian tube dysfunction     Heart disease     Herpes simplex     History of transfusion     Hyperlipidemia     Hypertension     Hypothyroidism 12/23/2020    Intrinsic asthma 12/23/2020    Knee dislocation     Labral tear of right hip joint     Laryngitis sicca     Laryngitis, chronic     Left carotid bruit 03/09/2016    MI (myocardial infarction)     Myalgia due to statin 06/25/2019    Open wound of right hip 09/14/2021    Osteomyelitis of right femur 07/06/2021    Otorrhea     Pacemaker 11/17/2016    Primary osteoarthritis of left knee 12/23/2020    Psoriasis vulgaris 12/23/2020    S/P coronary artery stent placement 03/09/2016    Sensorineural hearing loss     Seropositive rheumatoid arthritis of multiple sites 12/27/2019    Overview:  -myochrysine '93-'96 -methotrexate '96--->11/98;r/s  restarted 2/99--> 8/14 (anemia) -sulfasalazine- not effective -penicillamine 6/98-->10/98; no effect -leflunomide 11/98--> - Humira '13-->didn't take - Enbrel 12/14-->3/15- no effect!   Last Assessment & Plan:  - \"aching all over\" because she had to be off her anti-rheumatic drugs for 2 weeks in preparation for her R knee surgery - he    Sick sinus syndrome 12/27/2019    Sjogren's disease     Spondylolisthesis of lumbar region 01/17/2018    Syncope, recurrent 02/08/2021    Urinary tract infection     UTI (urinary tract infection) 04/19/2024       Past Surgical History:   Procedure Laterality Date    A-V CARDIAC PACEMAKER INSERTION  2016    ATRIAL CARDIAC PACEMAKER INSERTION      CARDIAC CATHETERIZATION      CATARACT EXTRACTION      CERVICAL CORPECTOMY N/A 3/3/2021    Procedure: CERVICAL 6 CORPECTOMY WITH TITANIUM CAGE WITH NEURO MONITORING;  Surgeon: Bandar Shea MD;  Location: [facility];  Service: Neurosurgery;  Laterality: N/A;    CHOLECYSTECTOMY WITH INTRAOPERATIVE CHOLANGIOGRAM N/A 7/3/2024    Procedure: CHOLECYSTECTOMY " LAPAROSCOPIC WITH INTRAOPERATIVE CHOLANGIOGRAM;  Surgeon: Moises Keyes MD;  Location:  [facility] OR;  Service: General;  Laterality: N/A;    COLONOSCOPY  11/08/2011    One fold in the ascending colon which showed ulcer otherwise normal exam    COLONOSCOPY  11/12/2004    Normal exam repeat in five years    CORONARY ANGIOPLASTY WITH STENT PLACEMENT      X 2; 2013 & 2014    ENDOSCOPY  07/10/2014    Normal exam    ENDOSCOPY N/A 5/30/2024    Procedure: ESOPHAGOGASTRODUODENOSCOPY WITH ANESTHESIA;  Surgeon: Taiwo Sanchez MD;  Location: [facility] ENDOSCOPY;  Service: Gastroenterology;  Laterality: N/A;  preop; dysphagia  postop: balloon dilation  PCP Jesus Manuel jeff    FLAP LEG Right 9/14/2021    Procedure: RIGHT GLUTEAL FASCIOCUTANEOUS ADVANCEMENT FLAP AND RIGHT TENSOR FASCIAL JESSICA FLAP;  Surgeon: Amadeo Turner MD;  Location: [facility] OR;  Service: Plastics;  Laterality: Right;    HIP ABDUCTION TENOTOMY BILATERAL Right 1/14/2021    Procedure: RIGHT HIP GLUTEUS MEDLUS / MINIMUS REPAIR, POSSIBLE ACHILLES ALLOGRAFT;  Surgeon: Nino Carlson MD;  Location:  [facility] OR;  Service: Orthopedics;  Laterality: Right;    INCISION AND DRAINAGE ABSCESS Right 6/4/2022    Procedure: INCISION AND DRAINAGE ABSCESS right hip;  Surgeon: Magda Salcido MD;  Location:  [facility] OR;  Service: General;  Laterality: Right;    INCISION AND DRAINAGE ABSCESS Right 6/10/2022    Procedure: RIGHT HIP INCISION AND DRAINAGE. MD NEEDS 3L VANC IRRIGATION, CURRETTES, DAICANS, KERLEX ROLLS;  Surgeon: Amadeo Turner MD;  Location:  [facility] OR;  Service: Plastics;  Laterality: Right;    INCISION AND DRAINAGE HIP Right 2/9/2021    Procedure: HIP INCISION AND DRAINAGE;  Surgeon: Nino Carlson MD;  Location: [facility] OR;  Service: Orthopedics;  Laterality: Right;    INCISION AND DRAINAGE LEG Right 10/24/2021    Procedure: INCISION AND DRAINAGE LOWER EXTREMITY;  Surgeon: Amadeo Turner MD;  Location:  [facility] OR;  Service: Plastics;  Laterality: Right;    INCISION AND  DRAINAGE OF WOUND Right 7/8/2021    Procedure: INCISION AND DRAINAGE WOUND RIGHT HIP;  Surgeon: James Huntley MD;  Location:  [facility] OR;  Service: Orthopedics;  Laterality: Right;    JOINT REPLACEMENT      KYPHOPLASTY WITH BIOPSY Bilateral 10/26/2021    Procedure: THOARCIC 12 KYPHOPLASTY WITH BIOPSY;  Surgeon: Bandar Shea MD;  Location:  [facility] OR;  Service: Neurosurgery;  Laterality: Bilateral;    LEG DEBRIDEMENT Right 9/14/2021    Procedure: DEBRIDEMENT OF RIGHT HIP WOUND, RIGHT GLUTEAL FASCIOCUTANEOUS ADVANCEMENT FLAP AND RIGHT TENSOR FASCIAL JESSICA FLAP;  Surgeon: Amadeo Turner MD;  Location:  [facility] OR;  Service: Plastics;  Laterality: Right;    LUMBAR DISCECTOMY Right 3/23/2021    Procedure: LUMBAR DISCECTOMY MICRO, Lumbar 1/2 right;  Surgeon: Bandar Shea MD;  Location:  [facility] OR;  Service: Neurosurgery;  Laterality: Right;    LUMBAR FUSION N/A 1/19/2018    Procedure: L3-4,L4-5 DECOMPRESSION, POSTERIOR SPINAL FUSION WITH INSTRUMENTATION;  Surgeon: Fortino Oropeza MD;  Location:  [facility] OR;  Service:     LUMBAR LAMINECTOMY WITH FUSION Left 1/17/2018    Procedure: LEFT L3-4 L4-5 LATERAL LUMBAR INTERBODY FUSION;  Surgeon: Fortino Oropeza MD;  Location:  [facility];  Service:     MASS EXCISION Right 4/23/2024    Procedure: RIGHT BUTTOCK MASS EXCISION;  Surgeon: Moises Keyes MD;  Location:  [facility] OR;  Service: General;  Laterality: Right;    MYRINGOTOMY W/ TUBES  09/04/2014    TUBES NO LONGER IN PLACE    OTHER SURGICAL HISTORY      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 so hardware was removed and spacers were placed    REPLACEMENT TOTAL KNEE Right     URETEROSCOPY LASER LITHOTRIPSY WITH STENT INSERTION N/A 6/21/2024    Procedure: URETEROSCOPY LASER LITHOTRIPSY WITH STENT INSERTION LEFT;  Surgeon: Ky Plascencia MD;  Location:  [facility] OR;  Service: Urology;  Laterality: N/A;       Social History     Socioeconomic History    Marital status:    Tobacco Use    Smoking status:  Never     Passive exposure: Never    Smokeless tobacco: Never   Vaping Use    Vaping status: Never Used   Substance and Sexual Activity    Alcohol use: No    Drug use: Never    Sexual activity: Defer     Birth control/protection: Abstinence       Family History   Problem Relation Age of Onset    Cancer Mother         Lung cancer    Heart disease Father         Doied of heart. Attack       Objective    LMP  (LMP Unknown)     Physical Exam  Nursing note reviewed.   Constitutional:       General: She is not in acute distress.     Appearance: Normal appearance. She is not ill-appearing.   HENT:      Nose: No congestion.   Abdominal:      Tenderness: There is no right CVA tenderness or left CVA tenderness.      Hernia: No hernia is present.   Skin:     General: Skin is warm and dry.   Neurological:      Mental Status: She is alert and oriented to person, place, and time.   Psychiatric:         Mood and Affect: Mood normal.         Behavior: Behavior normal.             Results for orders placed or performed in visit on 08/09/24   Basic Metabolic Panel    Specimen: Blood   Result Value Ref Range    Glucose 130 (H) 65 - 99 mg/dL    BUN 24 (H) 8 - 23 mg/dL    Creatinine 1.47 (H) 0.57 - 1.00 mg/dL    Sodium 141 136 - 145 mmol/L    Potassium 4.0 3.5 - 5.2 mmol/L    Chloride 104 98 - 107 mmol/L    CO2 21.0 (L) 22.0 - 29.0 mmol/L    Calcium 9.6 8.6 - 10.5 mg/dL    BUN/Creatinine Ratio 16.3 7.0 - 25.0    Anion Gap 16.0 (H) 5.0 - 15.0 mmol/L    eGFR 38.0 (L) >60.0 mL/min/1.73     *Note: Due to a large number of results and/or encounters for the requested time period, some results have not been displayed. A complete set of results can be found in Results Review.     Assessment and Plan    Diagnoses and all orders for this visit:    1. Urge incontinence (Primary)  -     oxybutynin XL (Ditropan XL) 10 MG 24 hr tablet; Take 1 tablet by mouth Daily.  Dispense: 30 tablet; Refill: 11      Gemtesa worked well while on the medication has  been out of the medicine for many months as insurance would not approve since then all symptoms have returned and is requiring around-the-clock depends usage    Will try on a different overactive bladder medicine will start on oxybutynin XL 10 mg daily    Follow-up 6 months

## 2024-08-08 ENCOUNTER — HOME CARE VISIT (OUTPATIENT)
Dept: HOME HEALTH SERVICES | Facility: CLINIC | Age: 69
End: 2024-08-08
Payer: MEDICARE

## 2024-08-08 VITALS
HEART RATE: 74 BPM | DIASTOLIC BLOOD PRESSURE: 70 MMHG | RESPIRATION RATE: 18 BRPM | SYSTOLIC BLOOD PRESSURE: 128 MMHG | OXYGEN SATURATION: 95 % | TEMPERATURE: 97.2 F

## 2024-08-08 PROCEDURE — G0299 HHS/HOSPICE OF RN EA 15 MIN: HCPCS

## 2024-08-08 PROCEDURE — G0157 HHC PT ASSISTANT EA 15: HCPCS

## 2024-08-09 ENCOUNTER — HOME CARE VISIT (OUTPATIENT)
Dept: HOME HEALTH SERVICES | Facility: CLINIC | Age: 69
End: 2024-08-09
Payer: MEDICARE

## 2024-08-09 ENCOUNTER — LAB REQUISITION (OUTPATIENT)
Dept: LAB | Facility: HOSPITAL | Age: 69
End: 2024-08-09
Payer: MEDICARE

## 2024-08-09 VITALS
DIASTOLIC BLOOD PRESSURE: 80 MMHG | RESPIRATION RATE: 18 BRPM | SYSTOLIC BLOOD PRESSURE: 120 MMHG | OXYGEN SATURATION: 91 % | HEART RATE: 73 BPM | TEMPERATURE: 97.3 F

## 2024-08-09 VITALS
HEART RATE: 76 BPM | SYSTOLIC BLOOD PRESSURE: 128 MMHG | RESPIRATION RATE: 18 BRPM | TEMPERATURE: 97.5 F | DIASTOLIC BLOOD PRESSURE: 70 MMHG | OXYGEN SATURATION: 95 %

## 2024-08-09 DIAGNOSIS — K81.9 CHOLECYSTITIS, UNSPECIFIED: ICD-10-CM

## 2024-08-09 DIAGNOSIS — N18.32 STAGE 3B CHRONIC KIDNEY DISEASE: Primary | ICD-10-CM

## 2024-08-09 LAB
ANION GAP SERPL CALCULATED.3IONS-SCNC: 16 MMOL/L (ref 5–15)
BUN SERPL-MCNC: 24 MG/DL (ref 8–23)
BUN/CREAT SERPL: 16.3 (ref 7–25)
CALCIUM SPEC-SCNC: 9.6 MG/DL (ref 8.6–10.5)
CHLORIDE SERPL-SCNC: 104 MMOL/L (ref 98–107)
CO2 SERPL-SCNC: 21 MMOL/L (ref 22–29)
CREAT SERPL-MCNC: 1.47 MG/DL (ref 0.57–1)
EGFRCR SERPLBLD CKD-EPI 2021: 38 ML/MIN/1.73
GLUCOSE SERPL-MCNC: 130 MG/DL (ref 65–99)
POTASSIUM SERPL-SCNC: 4 MMOL/L (ref 3.5–5.2)
SODIUM SERPL-SCNC: 141 MMOL/L (ref 136–145)

## 2024-08-09 PROCEDURE — G0299 HHS/HOSPICE OF RN EA 15 MIN: HCPCS

## 2024-08-09 PROCEDURE — 80048 BASIC METABOLIC PNL TOTAL CA: CPT

## 2024-08-12 ENCOUNTER — OFFICE VISIT (OUTPATIENT)
Dept: WOUND CARE | Facility: HOSPITAL | Age: 69
End: 2024-08-12
Payer: MEDICARE

## 2024-08-12 DIAGNOSIS — A49.9 BACTERIAL INFECTION, UNSPECIFIED: ICD-10-CM

## 2024-08-12 DIAGNOSIS — Z99.3 DEPENDENCE ON WHEELCHAIR: ICD-10-CM

## 2024-08-12 DIAGNOSIS — Z48.817 ENCOUNTER FOR SURGICAL AFTERCARE FOLLOWING SURGERY ON THE SKIN AND SUBCUTANEOUS TISSUE: Primary | ICD-10-CM

## 2024-08-12 DIAGNOSIS — G82.20 PARAPLEGIA, UNSPECIFIED: ICD-10-CM

## 2024-08-12 PROCEDURE — 97605 NEG PRS WND THER DME<=50SQCM: CPT

## 2024-08-13 ENCOUNTER — HOME CARE VISIT (OUTPATIENT)
Dept: HOME HEALTH SERVICES | Facility: CLINIC | Age: 69
End: 2024-08-13
Payer: MEDICARE

## 2024-08-13 VITALS
TEMPERATURE: 98.4 F | RESPIRATION RATE: 16 BRPM | DIASTOLIC BLOOD PRESSURE: 50 MMHG | HEART RATE: 84 BPM | OXYGEN SATURATION: 86 % | SYSTOLIC BLOOD PRESSURE: 122 MMHG

## 2024-08-13 PROCEDURE — G0158 HHC OT ASSISTANT EA 15: HCPCS

## 2024-08-13 PROCEDURE — G0299 HHS/HOSPICE OF RN EA 15 MIN: HCPCS

## 2024-08-13 PROCEDURE — G0157 HHC PT ASSISTANT EA 15: HCPCS

## 2024-08-14 ENCOUNTER — HOME CARE VISIT (OUTPATIENT)
Dept: HOME HEALTH SERVICES | Facility: CLINIC | Age: 69
End: 2024-08-14
Payer: MEDICARE

## 2024-08-14 ENCOUNTER — OFFICE VISIT (OUTPATIENT)
Dept: UROLOGY | Facility: CLINIC | Age: 69
End: 2024-08-14
Payer: MEDICARE

## 2024-08-14 ENCOUNTER — TELEPHONE (OUTPATIENT)
Dept: UROLOGY | Facility: CLINIC | Age: 69
End: 2024-08-14
Payer: MEDICARE

## 2024-08-14 ENCOUNTER — OFFICE VISIT (OUTPATIENT)
Dept: CARDIOLOGY | Facility: CLINIC | Age: 69
End: 2024-08-14
Payer: MEDICARE

## 2024-08-14 VITALS
HEART RATE: 77 BPM | HEIGHT: 66 IN | OXYGEN SATURATION: 91 % | BODY MASS INDEX: 33.73 KG/M2 | SYSTOLIC BLOOD PRESSURE: 102 MMHG | DIASTOLIC BLOOD PRESSURE: 52 MMHG

## 2024-08-14 VITALS
TEMPERATURE: 97.8 F | DIASTOLIC BLOOD PRESSURE: 68 MMHG | SYSTOLIC BLOOD PRESSURE: 110 MMHG | OXYGEN SATURATION: 94 % | HEART RATE: 88 BPM | RESPIRATION RATE: 16 BRPM

## 2024-08-14 DIAGNOSIS — I25.10 CORONARY ARTERY DISEASE INVOLVING NATIVE CORONARY ARTERY OF NATIVE HEART WITHOUT ANGINA PECTORIS: Primary | ICD-10-CM

## 2024-08-14 DIAGNOSIS — E78.5 HYPERLIPIDEMIA LDL GOAL <70: ICD-10-CM

## 2024-08-14 DIAGNOSIS — N39.41 URGE INCONTINENCE: Primary | ICD-10-CM

## 2024-08-14 DIAGNOSIS — Z95.0 PRESENCE OF CARDIAC PACEMAKER: ICD-10-CM

## 2024-08-14 DIAGNOSIS — I50.32 CHRONIC HEART FAILURE WITH PRESERVED EJECTION FRACTION: ICD-10-CM

## 2024-08-14 PROCEDURE — 99214 OFFICE O/P EST MOD 30 MIN: CPT | Performed by: NURSE PRACTITIONER

## 2024-08-14 PROCEDURE — 1159F MED LIST DOCD IN RCRD: CPT | Performed by: NURSE PRACTITIONER

## 2024-08-14 PROCEDURE — 3074F SYST BP LT 130 MM HG: CPT | Performed by: NURSE PRACTITIONER

## 2024-08-14 PROCEDURE — 93000 ELECTROCARDIOGRAM COMPLETE: CPT | Performed by: NURSE PRACTITIONER

## 2024-08-14 PROCEDURE — 3078F DIAST BP <80 MM HG: CPT | Performed by: NURSE PRACTITIONER

## 2024-08-14 PROCEDURE — 1160F RVW MEDS BY RX/DR IN RCRD: CPT | Performed by: NURSE PRACTITIONER

## 2024-08-14 PROCEDURE — G0300 HHS/HOSPICE OF LPN EA 15 MIN: HCPCS

## 2024-08-14 RX ORDER — ONDANSETRON 4 MG/1
TABLET, FILM COATED ORAL
Qty: 45 TABLET | Refills: 0 | OUTPATIENT
Start: 2024-08-14

## 2024-08-14 RX ORDER — COLCHICINE 0.6 MG/1
0.6 TABLET ORAL DAILY
Status: ON HOLD | COMMUNITY
Start: 2024-07-26

## 2024-08-14 RX ORDER — OXYBUTYNIN CHLORIDE 10 MG/1
10 TABLET, EXTENDED RELEASE ORAL DAILY
Qty: 30 TABLET | Refills: 11 | Status: ON HOLD | OUTPATIENT
Start: 2024-08-14

## 2024-08-14 NOTE — TELEPHONE ENCOUNTER
Spoke with patient to see if she could come in a little bit early, as Carey has a bit of a gap in her schedule today. Patient has another appt at 1:00 PM but will come here immediately after to try and get in early.

## 2024-08-14 NOTE — PROGRESS NOTES
Subjective:     Encounter Date:  8/14/2024      Patient ID: Tawny Shin is a 69 y.o. female.    Chief Complaint: Routine follow-up CAD, SSS with pacemaker in place     History of Present Illness     The patient presents to follow-up regarding her coronary artery disease (RCA PCI in 2010, followed by non-STEMI requiring LAD PCI in 2013).  Most recent cardiac cath was done in September 2019 by Dr. Melchor, reporting patent stents and no other obstructive CAD.  She also has a history of hyperlipidemia, RA, hypertension.  She transferred her care from [facility] to Dr. Hernandez in December 2019.  At that point, she was no longer having chest pain after Imdur had been increased to 60 mg daily at the prior visit.  She was taking aspirin and Brilinta 90 mg twice daily.  Due to statin intolerance, Repatha was prescribed and she was instructed to stop taking Zetia.  She was transitioned from Brilinta to Plavix.    She came back to see Dr. Hernandez in August 2020 and she was having some short-lived substernal chest pain with no aggravating or alleviating factors.  She reported ENT thought that might be reflux.  This was noted to be different compared to her angina.  She was not requiring NTG.  She did report an episode of vasovagal syncope when getting injections at Dr. Chaidez office. pacemaker interrogation following showed no arrhythmias at the time of her event.  No changes were made at that visit.    Dr. Hernandez and I later saw the patient in consultation when she was hospitalized in September 2021 following surgery for a chronic right hip wound.  It was noted she had had a complicated previous several months including ruptured gluteus tendon and right hip pain, SHERRI a bacteremia, lumbar surgery for osteomyelitis, and had undergone cervical fusion procedure.  She had also been diagnosed with a left lower extremity DVT in June 2021, prompting her to be placed on Eliquis.  Echo had been done in February 2021 which showed a  normal LVEF, mild MR, no evidence of vegetation on heart valves or pacemaker leads.  She was stable from a cardiac standpoint at that time.  Recommendations were given to continue aspirin 81 mg daily without interruption given prior drug-eluting stent placement in 2010 and 2013.  Beta-blocker and long-acting nitrate were continued.  She was off of Repatha due to insurance reasons, and it was noted that we would get this resumed as an outpatient or consider an alternative PCSK9 inhibitor.  Continuation of Eliquis was deferred to the prescribing physician, she was taking this for noncardiac reasons (DVT).    She was hospitalized 3 times in September 2021 for bacteremia, epidural hematoma, large pleural effusion.  She was hospitalized in June/July 2023 for a right hip abscess.    I saw her in January 2024 and she reported 3 episodes of chest pain over the previous 2 to 3 weeks, longest episode lasting 1 hour.  Episodes occurred at rest.  She had radiation to her left upper arm.  This was different compared to her previous angina.  I did order a stress echo for further evaluation.  This was abnormal, so cardiac catheterization was recommended.  However, when I contacted the patient over the phone she stated she was not having any further chest pain and declined pursuing cardiac catheterization at that time.    By way of review, she was later hospitalized again and labeled with diastolic congestive heart failure.  She followed up with DANIELA Pedersen in June 2024 and Aldactone was added to her medical therapy for her diastolic CHF.  Anamaria also repeated an echo which revealed stable findings with a normal LVEF.    More recently, the patient had a prolonged inpatient hospitalization followed by prolonged rehabilitation stay.  She states she has only been home for a couple of weeks.  The recent hospitalization involved cholecystectomy, ureteral stone, urinary tract infection and respiratory failure according to  documentation.    The patient tells me today that while she was hospitalized she had 3-4 episodes of left sided chest pain at rest that lasted about a minute.  She denies radiation of the pain or associated symptoms.  She states she has not had any further chest pain 3 to 4 weeks.  She reports low normal blood pressures.  She reports dry mouth.  She reports a little bit of shortness of breath if she exerts at times.  She denies orthopnea, PND, palpitations, syncope.  She has lower extremity edema which is chronic for her.  As previously noted, she is wheelchair-bound.    The following portions of the patient's history were reviewed and updated as appropriate: allergies, current medications, past family history, past medical history, past social history, past surgical history and problem list.    Review of Systems   Constitutional: Positive for malaise/fatigue.   HENT:          Dry mouth    Cardiovascular:  Positive for dyspnea on exertion and leg swelling. Negative for chest pain, claudication, near-syncope, orthopnea, palpitations, paroxysmal nocturnal dyspnea and syncope.   Respiratory:  Negative for cough.    Hematologic/Lymphatic: Does not bruise/bleed easily.   Musculoskeletal:  Negative for falls.        Pt reports limited movement of her RLE    Gastrointestinal:  Negative for bloating.   Neurological:  Positive for weakness. Negative for dizziness and light-headedness.         Current Outpatient Medications:     acetaminophen (TYLENOL) 325 MG tablet, Take 2 tablets by mouth Every 6 (Six) Hours As Needed for Mild Pain (mild pain). Indications: Fever, Pain, Disp: , Rfl:     allopurinol (ZYLOPRIM) 100 MG tablet, Take 1 tablet by mouth Daily., Disp: 30 tablet, Rfl: 0    apixaban (Eliquis) 5 MG tablet tablet, Take 1 tablet by mouth 2 (Two) Times a Day., Disp: , Rfl:     Ascorbic Acid (Vitamin C) 500 MG capsule, Take 1 tablet by mouth Daily. Indications: Inadequate Vitamin C, Disp: , Rfl:     aspirin 81 MG EC  tablet, Take 1 tablet by mouth Daily. Indications: Buildup of Plaques in Large Arteries Leading to the Brain, Disp: , Rfl:     carvedilol (COREG) 25 MG tablet, Take 1 tablet by mouth 2 (Two) Times a Day With Meals. Indications: High Blood Pressure Disorder, Disp: , Rfl:     colchicine 0.6 MG tablet, Take 1 tablet by mouth Daily., Disp: , Rfl:     Diclofenac Sodium (VOLTAREN) 1 % gel gel, Apply 4 g topically to the appropriate area as directed 4 (Four) Times a Day As Needed (Mild pain). Indications: Joint Damage causing Pain and Loss of Function, Disp: , Rfl:     DULoxetine (CYMBALTA) 60 MG capsule, Take 1 capsule by mouth Daily. Indications: Musculoskeletal Pain, Disp: , Rfl:     estradiol (ESTRACE) 0.1 MG/GM vaginal cream, Insert 2 g into the vagina Daily., Disp: 42.5 g, Rfl: 0    ferrous sulfate 324 (65 Fe) MG tablet delayed-release EC tablet, Take 1 tablet by mouth Daily With Breakfast. Indications: Iron Deficiency, Disp: , Rfl:     folic acid (FOLVITE) 1 MG tablet, Take 1 tablet by mouth Daily. Indications: Anemia From Inadequate Folic Acid, Disp: , Rfl:     furosemide (LASIX) 40 MG tablet, Take 1 tablet by mouth As Needed (shortness of breath). Indications: Cardiac Failure, Edema, Disp: , Rfl:     isosorbide mononitrate (IMDUR) 60 MG 24 hr tablet, Take 1 tablet by mouth Daily. Indications: Stable Angina Pectoris, Disp: , Rfl:     leflunomide (ARAVA) 20 MG tablet, Take 1 tablet by mouth every night at bedtime. Indications: Rheumatoid Arthritis, Disp: 90 tablet, Rfl: 3    levothyroxine (SYNTHROID, LEVOTHROID) 75 MCG tablet, Take 1 tablet by mouth Daily. Indications: Underactive Thyroid, Disp: , Rfl:     lidocaine (Lidoderm) 5 %, Place 1 patch on the skin as directed by provider Daily As Needed for Mild Pain. Remove & Discard patch within 12 hours or as directed by MD, Disp: 30 each, Rfl: 2    losartan (COZAAR) 50 MG tablet, Take 1 tablet by mouth Daily. Indications: High Blood Pressure Disorder, Disp: , Rfl:      methenamine (HIPREX) 1 g tablet, Take 1 tablet by mouth 2 (Two) Times a Day With Meals. Indications: Urinary Tract Infection, Disp: , Rfl:     miconazole (MICOTIN) 2 % powder, Apply 1 Application topically to the appropriate area as directed As Needed., Disp: , Rfl:     multivitamin with minerals tablet tablet, Take 1 tablet by mouth Daily., Disp:  , Rfl:     nitroglycerin (NITROSTAT) 0.4 MG SL tablet, Place 1 tablet under the tongue Every 5 (Five) Minutes As Needed for Chest Pain. Take no more than 3 doses in 15 minutes.  Indications: Acute Angina Pectoris, Disp: , Rfl:     pantoprazole (Protonix) 40 MG EC tablet, Take 1 tablet by mouth Daily for 90 days., Disp: 90 tablet, Rfl: 0    predniSONE (DELTASONE) 5 MG tablet, Take 1 tablet by mouth Daily. Indications: Acute Joint Inflammation in Gout, Disp: , Rfl:     spironolactone (ALDACTONE) 25 MG tablet, Take 1 tablet by mouth Daily., Disp: 30 tablet, Rfl: 11    traMADol (ULTRAM) 50 MG tablet, Take 1 tablet by mouth Every 12 (Twelve) Hours As Needed for Moderate Pain. Indications: Pain, Disp: , Rfl:     valACYclovir (VALTREX) 500 MG tablet, Take 1 tablet by mouth Daily., Disp: , Rfl:     Evolocumab (REPATHA) solution prefilled syringe injection, Inject 1 mL under the skin into the appropriate area as directed Every 14 (Fourteen) Days., Disp: 2 mL, Rfl: 11    oxybutynin XL (Ditropan XL) 10 MG 24 hr tablet, Take 1 tablet by mouth Daily., Disp: 30 tablet, Rfl: 11       Objective:      Vitals:    08/14/24 1321   BP: 102/52   Pulse: 77   SpO2: 91%     Pt was unable to stand to weigh today     Physical Exam  Constitutional:       General: She is not in acute distress.     Appearance: She is well-developed. She is not diaphoretic.   HENT:      Head: Normocephalic and atraumatic.   Neck:      Vascular: No JVD.   Cardiovascular:      Rate and Rhythm: Normal rate and regular rhythm.      Heart sounds: Normal heart sounds. No murmur heard.  Pulmonary:      Effort: Pulmonary  effort is normal. No respiratory distress.      Breath sounds: Normal breath sounds.   Chest:      Chest wall: No tenderness.   Abdominal:      General: There is no distension.      Palpations: Abdomen is soft.      Tenderness: There is no abdominal tenderness.   Musculoskeletal:         General: Normal range of motion.      Right lower leg: Edema present.      Left lower leg: Edema present.   Skin:     General: Skin is warm and dry.   Neurological:      Mental Status: She is alert and oriented to person, place, and time.      Cranial Nerves: No cranial nerve deficit.   Psychiatric:         Behavior: Behavior normal.         Lab Review:   Lab Results   Component Value Date    GLUCOSE 130 (H) 08/09/2024    BUN 24 (H) 08/09/2024    CREATININE 1.47 (H) 08/09/2024    EGFRIFNONA 99 11/11/2021    EGFRIFAFRI 110 10/12/2021    BCR 16.3 08/09/2024    K 4.0 08/09/2024    CO2 21.0 (L) 08/09/2024    CALCIUM 9.6 08/09/2024    PROTENTOTREF 6.0 08/02/2024    ALBUMIN 3.0 (L) 08/02/2024    LABIL2 1.0 08/02/2024    AST 43 (H) 08/02/2024    ALT 35 (H) 08/02/2024     Lab Results   Component Value Date    CHOL 171 05/25/2024    CHLPL 356 (H) 01/22/2024    TRIG 252 (H) 05/25/2024    HDL 23 (L) 05/25/2024     (H) 05/25/2024     Lab Results   Component Value Date    WBC 4.68 08/02/2024    HGB 8.2 (L) 08/02/2024    HCT 29.0 (L) 08/02/2024    .7 (H) 08/02/2024     08/02/2024     Lab Results   Component Value Date    HGBA1C 6.20 (H) 05/25/2024             ECG 12 Lead    Date/Time: 8/14/2024 2:35 PM  Performed by: Ximena Vazquez APRN    Authorized by: Ximena Vazuqez APRN  Comparison: compared with previous ECG from 6/12/2024  Similar to previous ECG  Rhythm: sinus rhythm  BPM: 77  Other findings: low voltage    Clinical impression: abnormal EKG              6/2024 echo:        Left ventricular systolic function is normal. Left ventricular ejection fraction appears to be 61 - 65%.    Left ventricular wall thickness is  consistent with mild concentric hypertrophy.    The left atrial cavity is mildly dilated.    Normal size and function of the right ventricle.    No significant valvular pathology.    Compared to prior exam from 4/24/2022, no significant change.       2/2024 stress echo:      Abnormal stress echo with echocardiographic evidence for myocardial ischemia located in the lateral wall consistent with a high risk study for myocardial ischemia.    The following left ventricular wall segments are hypokinetic: mid anterolateral, apical lateral and mid inferolateral.    Left ventricular systolic function is normal at rest.    Assessment/Plan:     Problem List Items Addressed This Visit          Cardiovascular and Mediastinum    Coronary artery disease - Primary- stable, no angina     Overview     PCI RCA '10, NSTEMI requiring LAD PCI in '13    [facility] 9/3/19 at [place]: no significant dz; stents patent         Hyperlipidemia:statin intolerant, refilled pcsk9i today     Hypertension: well controlled    Pacemaker: functioning appropriately    Sick sinus syndrome (CMS/HCC): stable               Recommendations/plans:    1.  CAD: Established problem, stable.  She had an abnormal stress echo in February 2024, which I ordered for evaluation of chest pain.  Cardiac catheterization was recommended at that time but she declined due to lack of symptoms which was considered to be reasonable.  She tells me today she had 3-4 episodes of chest pain that lasted about a minute when she was recently hospitalized.  She states that she has been home, particularly in the past 3 to 4 weeks she has had no further chest pain and continues to decline cardiac catheterization.  I agree this is reasonable at this time due to lack of symptoms.  She will call back if symptoms return.    -Continue aspirin 81 mg daily indefinitely without interruption.  -Continue current doses of carvedilol and Imdur  -Has not been requiring as needed sublingual  nitroglycerin  -Continue PCSK9 inhibitor.  Previously intolerant to statins.    2.  Sick sinus syndrome with dual-chamber pacemaker in place: Stable.  July 2024 device interrogation reviewed: Normal device function, satisfactory battery life, no arrhythmia episodes, 0.42% atrial pacing, 0.03% ventricular pacing    -Continue with routine device interrogations    3.  Essential hypertension: Well-controlled.  Continue current medical therapy.    4.  Chronic diastolic CHF: Continue current medical therapy as listed above.  She appears euvolemic to hypovolemic today.  Rather than taking Lasix 40 mg every day I have recommended she take this on an as needed basis for shortness of breath/orthopnea/PND.  She states she is unable to stand to weigh herself.  We discussed the importance of heart healthy low-sodium diet.    5.  Lower extremity DVT June 2021: Anticoagulated with Eliquis per PCP.  As previously noted by Dr. Hernandez, the decision regarding how long to continue this will be up to the prescribing provider, as she does not take this for cardiac reason.    F/u as planned with Dr. Hernandez in January, call sooner with symptoms or concerns    DANIELA Christine

## 2024-08-15 ENCOUNTER — HOME CARE VISIT (OUTPATIENT)
Dept: HOME HEALTH SERVICES | Facility: CLINIC | Age: 69
End: 2024-08-15
Payer: MEDICARE

## 2024-08-15 VITALS
HEART RATE: 80 BPM | OXYGEN SATURATION: 92 % | RESPIRATION RATE: 16 BRPM | TEMPERATURE: 97.5 F | SYSTOLIC BLOOD PRESSURE: 110 MMHG | DIASTOLIC BLOOD PRESSURE: 56 MMHG

## 2024-08-15 VITALS
TEMPERATURE: 98.4 F | SYSTOLIC BLOOD PRESSURE: 100 MMHG | DIASTOLIC BLOOD PRESSURE: 50 MMHG | HEART RATE: 82 BPM | RESPIRATION RATE: 16 BRPM | OXYGEN SATURATION: 89 %

## 2024-08-15 PROCEDURE — G0157 HHC PT ASSISTANT EA 15: HCPCS

## 2024-08-15 PROCEDURE — G0158 HHC OT ASSISTANT EA 15: HCPCS

## 2024-08-16 ENCOUNTER — HOME CARE VISIT (OUTPATIENT)
Dept: HOME HEALTH SERVICES | Facility: CLINIC | Age: 69
End: 2024-08-16
Payer: MEDICARE

## 2024-08-16 ENCOUNTER — HOSPITAL ENCOUNTER (INPATIENT)
Facility: HOSPITAL | Age: 69
LOS: 2 days | Discharge: HOME | DRG: 690 | End: 2024-08-20
Attending: EMERGENCY MEDICINE | Admitting: FAMILY MEDICINE
Payer: MEDICARE

## 2024-08-16 ENCOUNTER — APPOINTMENT (OUTPATIENT)
Dept: CT IMAGING | Facility: HOSPITAL | Age: 69
DRG: 690 | End: 2024-08-16
Payer: MEDICARE

## 2024-08-16 ENCOUNTER — LAB REQUISITION (OUTPATIENT)
Dept: LAB | Facility: HOSPITAL | Age: 69
DRG: 690 | End: 2024-08-16
Payer: MEDICARE

## 2024-08-16 ENCOUNTER — TELEPHONE (OUTPATIENT)
Dept: INTERNAL MEDICINE | Facility: CLINIC | Age: 69
End: 2024-08-16

## 2024-08-16 ENCOUNTER — APPOINTMENT (OUTPATIENT)
Dept: GENERAL RADIOLOGY | Facility: HOSPITAL | Age: 69
DRG: 690 | End: 2024-08-16
Payer: MEDICARE

## 2024-08-16 VITALS
HEART RATE: 82 BPM | SYSTOLIC BLOOD PRESSURE: 100 MMHG | RESPIRATION RATE: 16 BRPM | TEMPERATURE: 98.4 F | OXYGEN SATURATION: 92 % | DIASTOLIC BLOOD PRESSURE: 50 MMHG

## 2024-08-16 DIAGNOSIS — Z74.09 IMPAIRED MOBILITY: ICD-10-CM

## 2024-08-16 DIAGNOSIS — R13.19 ESOPHAGEAL DYSPHAGIA: ICD-10-CM

## 2024-08-16 DIAGNOSIS — L72.9 CYST OF BUTTOCKS: ICD-10-CM

## 2024-08-16 DIAGNOSIS — I24.89 DEMAND ISCHEMIA: ICD-10-CM

## 2024-08-16 DIAGNOSIS — N39.0 URINARY TRACT INFECTION, BACTERIAL: Primary | ICD-10-CM

## 2024-08-16 DIAGNOSIS — T81.41XD INFECTION FOLLOWING A PROCEDURE, SUPERFICIAL INCISIONAL SURGICAL SITE, SUBSEQUENT ENCOUNTER: ICD-10-CM

## 2024-08-16 DIAGNOSIS — G83.9 PARALYSIS: ICD-10-CM

## 2024-08-16 DIAGNOSIS — R53.1 GENERALIZED WEAKNESS: ICD-10-CM

## 2024-08-16 DIAGNOSIS — G82.20 LOWER PARAPLEGIA: ICD-10-CM

## 2024-08-16 DIAGNOSIS — G89.4 CHRONIC PAIN SYNDROME: ICD-10-CM

## 2024-08-16 DIAGNOSIS — A49.9 URINARY TRACT INFECTION, BACTERIAL: Primary | ICD-10-CM

## 2024-08-16 DIAGNOSIS — L89.319 PRESSURE INJURY OF SKIN OF RIGHT BUTTOCK, UNSPECIFIED INJURY STAGE: Chronic | ICD-10-CM

## 2024-08-16 DIAGNOSIS — S31.819A BUTTOCK WOUND, RIGHT, INITIAL ENCOUNTER: ICD-10-CM

## 2024-08-16 LAB
ALBUMIN SERPL-MCNC: 2.8 G/DL (ref 3.5–5.2)
ALBUMIN/GLOB SERPL: 0.8 G/DL
ALP SERPL-CCNC: 135 U/L (ref 39–117)
ALT SERPL W P-5'-P-CCNC: 28 U/L (ref 1–33)
ANION GAP SERPL CALCULATED.3IONS-SCNC: 13 MMOL/L (ref 5–15)
ANION GAP SERPL CALCULATED.3IONS-SCNC: 13 MMOL/L (ref 5–15)
ANISOCYTOSIS BLD QL: ABNORMAL
APTT PPP: 41.6 SECONDS (ref 24.5–36)
ARTERIAL PATENCY WRIST A: POSITIVE
AST SERPL-CCNC: 50 U/L (ref 1–32)
ATMOSPHERIC PRESS: 751 MMHG
BACTERIA UR QL AUTO: ABNORMAL /HPF
BASE EXCESS BLDA CALC-SCNC: -0.8 MMOL/L (ref 0–2)
BASOPHILS # BLD MANUAL: 0 10*3/MM3 (ref 0–0.2)
BASOPHILS NFR BLD MANUAL: 0 % (ref 0–1.5)
BDY SITE: ABNORMAL
BILIRUB SERPL-MCNC: 0.5 MG/DL (ref 0–1.2)
BILIRUB UR QL STRIP: ABNORMAL
BODY TEMPERATURE: 37
BUN SERPL-MCNC: 29 MG/DL (ref 8–23)
BUN SERPL-MCNC: 31 MG/DL (ref 8–23)
BUN/CREAT SERPL: 13.3 (ref 7–25)
BUN/CREAT SERPL: 13.7 (ref 7–25)
CA-I BLD-MCNC: 4.65 MG/DL (ref 4.6–5.4)
CALCIUM SPEC-SCNC: 9.2 MG/DL (ref 8.6–10.5)
CALCIUM SPEC-SCNC: 9.4 MG/DL (ref 8.6–10.5)
CHLORIDE SERPL-SCNC: 103 MMOL/L (ref 98–107)
CHLORIDE SERPL-SCNC: 103 MMOL/L (ref 98–107)
CLARITY UR: ABNORMAL
CLUMPED PLATELETS: PRESENT
CO2 SERPL-SCNC: 23 MMOL/L (ref 22–29)
CO2 SERPL-SCNC: 24 MMOL/L (ref 22–29)
COHGB MFR BLD: 1.2 % (ref 0–5)
COLOR UR: ABNORMAL
CREAT SERPL-MCNC: 2.11 MG/DL (ref 0.57–1)
CREAT SERPL-MCNC: 2.33 MG/DL (ref 0.57–1)
D-LACTATE SERPL-SCNC: 2 MMOL/L (ref 0.5–2)
DACRYOCYTES BLD QL SMEAR: ABNORMAL
DEPRECATED RDW RBC AUTO: 68.9 FL (ref 37–54)
EGFRCR SERPLBLD CKD-EPI 2021: 21.9 ML/MIN/1.73
EGFRCR SERPLBLD CKD-EPI 2021: 24.6 ML/MIN/1.73
EOSINOPHIL # BLD MANUAL: 0.05 10*3/MM3 (ref 0–0.4)
EOSINOPHIL NFR BLD MANUAL: 1 % (ref 0.3–6.2)
ERYTHROCYTE [DISTWIDTH] IN BLOOD BY AUTOMATED COUNT: 21.2 % (ref 12.3–15.4)
GLOBULIN UR ELPH-MCNC: 3.5 GM/DL
GLUCOSE SERPL-MCNC: 95 MG/DL (ref 65–99)
GLUCOSE SERPL-MCNC: 99 MG/DL (ref 65–99)
GLUCOSE UR STRIP-MCNC: NEGATIVE MG/DL
HCO3 BLDA-SCNC: 24.1 MMOL/L (ref 20–26)
HCT VFR BLD AUTO: 28.1 % (ref 34–46.6)
HCT VFR BLD CALC: 29.2 % (ref 38–51)
HGB BLD-MCNC: 8.6 G/DL (ref 12–15.9)
HGB BLDA-MCNC: 9.5 G/DL (ref 12–16)
HGB UR QL STRIP.AUTO: ABNORMAL
HOLD SPECIMEN: NORMAL
HOLD SPECIMEN: NORMAL
HYALINE CASTS UR QL AUTO: ABNORMAL /LPF
INR PPP: 1.37 (ref 0.91–1.09)
KETONES UR QL STRIP: ABNORMAL
LEUKOCYTE ESTERASE UR QL STRIP.AUTO: ABNORMAL
LIPASE SERPL-CCNC: 16 U/L (ref 13–60)
LYMPHOCYTES # BLD MANUAL: 0.6 10*3/MM3 (ref 0.7–3.1)
LYMPHOCYTES NFR BLD MANUAL: 8.1 % (ref 5–12)
Lab: ABNORMAL
MACROCYTES BLD QL SMEAR: ABNORMAL
MCH RBC QN AUTO: 28.5 PG (ref 26.6–33)
MCHC RBC AUTO-ENTMCNC: 30.6 G/DL (ref 31.5–35.7)
MCV RBC AUTO: 93 FL (ref 79–97)
METHGB BLD QL: 1 % (ref 0–3)
MICROCYTES BLD QL: ABNORMAL
MODALITY: ABNORMAL
MONOCYTES # BLD: 0.4 10*3/MM3 (ref 0.1–0.9)
NEUTROPHILS # BLD AUTO: 3.9 10*3/MM3 (ref 1.7–7)
NEUTROPHILS NFR BLD MANUAL: 63.6 % (ref 42.7–76)
NEUTS BAND NFR BLD MANUAL: 15.2 % (ref 0–5)
NITRITE UR QL STRIP: POSITIVE
OXYHGB MFR BLDV: 87.5 % (ref 94–99)
PCO2 BLDA: 40.1 MM HG (ref 35–45)
PCO2 TEMP ADJ BLD: 40.1 MM HG (ref 35–45)
PH BLDA: 7.39 PH UNITS (ref 7.35–7.45)
PH UR STRIP.AUTO: 5.5 [PH] (ref 5–8)
PH, TEMP CORRECTED: 7.39 PH UNITS (ref 7.35–7.45)
PLATELET # BLD AUTO: 115 10*3/MM3 (ref 140–450)
PMV BLD AUTO: ABNORMAL FL
PO2 BLDA: 62.3 MM HG (ref 83–108)
PO2 TEMP ADJ BLD: 62.3 MM HG (ref 83–108)
POIKILOCYTOSIS BLD QL SMEAR: ABNORMAL
POLYCHROMASIA BLD QL SMEAR: ABNORMAL
POTASSIUM BLDA-SCNC: 3.7 MMOL/L (ref 3.5–5.2)
POTASSIUM SERPL-SCNC: 4.1 MMOL/L (ref 3.5–5.2)
POTASSIUM SERPL-SCNC: 4.3 MMOL/L (ref 3.5–5.2)
PROT SERPL-MCNC: 6.3 G/DL (ref 6–8.5)
PROT UR QL STRIP: ABNORMAL
PROTHROMBIN TIME: 17.4 SECONDS (ref 11.8–14.8)
RBC # BLD AUTO: 3.02 10*6/MM3 (ref 3.77–5.28)
RBC # UR STRIP: ABNORMAL /HPF
REF LAB TEST METHOD: ABNORMAL
SAO2 % BLDCOA: 89.5 % (ref 94–99)
SMALL PLATELETS BLD QL SMEAR: ABNORMAL
SODIUM BLDA-SCNC: 139 MMOL/L (ref 136–145)
SODIUM SERPL-SCNC: 139 MMOL/L (ref 136–145)
SODIUM SERPL-SCNC: 140 MMOL/L (ref 136–145)
SP GR UR STRIP: 1.02 (ref 1–1.03)
SQUAMOUS #/AREA URNS HPF: ABNORMAL /HPF
TROPONIN T SERPL HS-MCNC: 70 NG/L
UROBILINOGEN UR QL STRIP: ABNORMAL
VARIANT LYMPHS NFR BLD MANUAL: 12.1 % (ref 19.6–45.3)
VENTILATOR MODE: ABNORMAL
WBC # UR STRIP: ABNORMAL /HPF
WBC MORPH BLD: NORMAL
WBC NRBC COR # BLD AUTO: 4.95 10*3/MM3 (ref 3.4–10.8)
WHOLE BLOOD HOLD COAG: NORMAL
WHOLE BLOOD HOLD SPECIMEN: NORMAL

## 2024-08-16 PROCEDURE — 84484 ASSAY OF TROPONIN QUANT: CPT | Performed by: EMERGENCY MEDICINE

## 2024-08-16 PROCEDURE — 85610 PROTHROMBIN TIME: CPT | Performed by: EMERGENCY MEDICINE

## 2024-08-16 PROCEDURE — 25010000002 CEFTRIAXONE PER 250 MG: Performed by: EMERGENCY MEDICINE

## 2024-08-16 PROCEDURE — 81001 URINALYSIS AUTO W/SCOPE: CPT | Performed by: EMERGENCY MEDICINE

## 2024-08-16 PROCEDURE — 82805 BLOOD GASES W/O2 SATURATION: CPT

## 2024-08-16 PROCEDURE — 83050 HGB METHEMOGLOBIN QUAN: CPT

## 2024-08-16 PROCEDURE — 82375 ASSAY CARBOXYHB QUANT: CPT

## 2024-08-16 PROCEDURE — 87147 CULTURE TYPE IMMUNOLOGIC: CPT | Performed by: EMERGENCY MEDICINE

## 2024-08-16 PROCEDURE — 25810000003 SODIUM CHLORIDE 0.9 % SOLUTION: Performed by: EMERGENCY MEDICINE

## 2024-08-16 PROCEDURE — 85025 COMPLETE CBC W/AUTO DIFF WBC: CPT | Performed by: EMERGENCY MEDICINE

## 2024-08-16 PROCEDURE — 80053 COMPREHEN METABOLIC PANEL: CPT

## 2024-08-16 PROCEDURE — 36600 WITHDRAWAL OF ARTERIAL BLOOD: CPT

## 2024-08-16 PROCEDURE — 85007 BL SMEAR W/DIFF WBC COUNT: CPT | Performed by: EMERGENCY MEDICINE

## 2024-08-16 PROCEDURE — 93005 ELECTROCARDIOGRAM TRACING: CPT | Performed by: EMERGENCY MEDICINE

## 2024-08-16 PROCEDURE — 85730 THROMBOPLASTIN TIME PARTIAL: CPT | Performed by: EMERGENCY MEDICINE

## 2024-08-16 PROCEDURE — 99285 EMERGENCY DEPT VISIT HI MDM: CPT

## 2024-08-16 PROCEDURE — 87154 CUL TYP ID BLD PTHGN 6+ TRGT: CPT | Performed by: EMERGENCY MEDICINE

## 2024-08-16 PROCEDURE — 83690 ASSAY OF LIPASE: CPT | Performed by: EMERGENCY MEDICINE

## 2024-08-16 PROCEDURE — 71045 X-RAY EXAM CHEST 1 VIEW: CPT

## 2024-08-16 PROCEDURE — 36415 COLL VENOUS BLD VENIPUNCTURE: CPT

## 2024-08-16 PROCEDURE — 74176 CT ABD & PELVIS W/O CONTRAST: CPT

## 2024-08-16 PROCEDURE — 87040 BLOOD CULTURE FOR BACTERIA: CPT | Performed by: EMERGENCY MEDICINE

## 2024-08-16 PROCEDURE — 83605 ASSAY OF LACTIC ACID: CPT | Performed by: EMERGENCY MEDICINE

## 2024-08-16 PROCEDURE — G0299 HHS/HOSPICE OF RN EA 15 MIN: HCPCS

## 2024-08-16 PROCEDURE — 93010 ELECTROCARDIOGRAM REPORT: CPT | Performed by: INTERNAL MEDICINE

## 2024-08-16 RX ORDER — ONDANSETRON 4 MG/1
4 TABLET, ORALLY DISINTEGRATING ORAL EVERY 8 HOURS PRN
Qty: 30 TABLET | Refills: 1 | Status: ON HOLD | OUTPATIENT
Start: 2024-08-16

## 2024-08-16 RX ORDER — SODIUM CHLORIDE 0.9 % (FLUSH) 0.9 %
10 SYRINGE (ML) INJECTION AS NEEDED
Status: DISCONTINUED | OUTPATIENT
Start: 2024-08-16 | End: 2024-08-20 | Stop reason: HOSPADM

## 2024-08-16 RX ORDER — ACETAMINOPHEN 500 MG
1000 TABLET ORAL ONCE
Status: COMPLETED | OUTPATIENT
Start: 2024-08-16 | End: 2024-08-16

## 2024-08-16 RX ADMIN — SODIUM CHLORIDE 1000 ML: 9 INJECTION, SOLUTION INTRAVENOUS at 21:16

## 2024-08-16 RX ADMIN — SODIUM CHLORIDE 1000 ML: 9 INJECTION, SOLUTION INTRAVENOUS at 23:40

## 2024-08-16 RX ADMIN — SODIUM CHLORIDE 1000 MG: 900 INJECTION INTRAVENOUS at 23:24

## 2024-08-16 RX ADMIN — ACETAMINOPHEN 1000 MG: 500 TABLET, FILM COATED ORAL at 21:13

## 2024-08-16 NOTE — PROGRESS NOTES
Renal function has declined since last evaluation, her creatinine is up to 2.33, GFR is currently at 21.9, electrolytes do appear to be stable. Needs to try to increase water intake by additional 16 oz/day, avoid nephrotoxic medications like NSAIDs.  This needs to be closely followed, recommend repeating a BMP as soon as next Wednesday via home health.

## 2024-08-17 PROBLEM — A49.9 UTI (URINARY TRACT INFECTION), BACTERIAL: Status: ACTIVE | Noted: 2024-08-17

## 2024-08-17 PROBLEM — N39.0 UTI (URINARY TRACT INFECTION), BACTERIAL: Status: ACTIVE | Noted: 2024-08-17

## 2024-08-17 LAB
ALBUMIN SERPL-MCNC: 2.4 G/DL (ref 3.5–5.2)
ALBUMIN/GLOB SERPL: 0.8 G/DL
ALP SERPL-CCNC: 116 U/L (ref 39–117)
ALT SERPL W P-5'-P-CCNC: 23 U/L (ref 1–33)
ANION GAP SERPL CALCULATED.3IONS-SCNC: 13 MMOL/L (ref 5–15)
AST SERPL-CCNC: 45 U/L (ref 1–32)
BACTERIA BLD CULT: ABNORMAL
BACTERIA UR QL AUTO: ABNORMAL /HPF
BASOPHILS # BLD AUTO: 0.03 10*3/MM3 (ref 0–0.2)
BASOPHILS NFR BLD AUTO: 0.8 % (ref 0–1.5)
BILIRUB SERPL-MCNC: 0.4 MG/DL (ref 0–1.2)
BILIRUB UR QL STRIP: NEGATIVE
BOTTLE TYPE: ABNORMAL
BUN SERPL-MCNC: 30 MG/DL (ref 8–23)
BUN/CREAT SERPL: 15.1 (ref 7–25)
CALCIUM SPEC-SCNC: 8.3 MG/DL (ref 8.6–10.5)
CHLORIDE SERPL-SCNC: 108 MMOL/L (ref 98–107)
CLARITY UR: ABNORMAL
CO2 SERPL-SCNC: 19 MMOL/L (ref 22–29)
COLOR UR: ABNORMAL
CREAT SERPL-MCNC: 1.99 MG/DL (ref 0.57–1)
DEPRECATED RDW RBC AUTO: 70.5 FL (ref 37–54)
EGFRCR SERPLBLD CKD-EPI 2021: 26.4 ML/MIN/1.73
EOSINOPHIL # BLD AUTO: 0.06 10*3/MM3 (ref 0–0.4)
EOSINOPHIL NFR BLD AUTO: 1.6 % (ref 0.3–6.2)
ERYTHROCYTE [DISTWIDTH] IN BLOOD BY AUTOMATED COUNT: 21.1 % (ref 12.3–15.4)
GEN 5 2HR TROPONIN T REFLEX: 78 NG/L
GLOBULIN UR ELPH-MCNC: 3.1 GM/DL
GLUCOSE SERPL-MCNC: 102 MG/DL (ref 65–99)
GLUCOSE UR STRIP-MCNC: NEGATIVE MG/DL
HCT VFR BLD AUTO: 25.7 % (ref 34–46.6)
HGB BLD-MCNC: 7.7 G/DL (ref 12–15.9)
HGB UR QL STRIP.AUTO: ABNORMAL
IMM GRANULOCYTES # BLD AUTO: 0.07 10*3/MM3 (ref 0–0.05)
IMM GRANULOCYTES NFR BLD AUTO: 1.9 % (ref 0–0.5)
KETONES UR QL STRIP: ABNORMAL
LEUKOCYTE ESTERASE UR QL STRIP.AUTO: ABNORMAL
LYMPHOCYTES # BLD AUTO: 1.06 10*3/MM3 (ref 0.7–3.1)
LYMPHOCYTES NFR BLD AUTO: 29.1 % (ref 19.6–45.3)
MAGNESIUM SERPL-MCNC: 1.7 MG/DL (ref 1.6–2.4)
MCH RBC QN AUTO: 28.7 PG (ref 26.6–33)
MCHC RBC AUTO-ENTMCNC: 30 G/DL (ref 31.5–35.7)
MCV RBC AUTO: 95.9 FL (ref 79–97)
MONOCYTES # BLD AUTO: 0.44 10*3/MM3 (ref 0.1–0.9)
MONOCYTES NFR BLD AUTO: 12.1 % (ref 5–12)
NEUTROPHILS NFR BLD AUTO: 1.98 10*3/MM3 (ref 1.7–7)
NEUTROPHILS NFR BLD AUTO: 54.5 % (ref 42.7–76)
NITRITE UR QL STRIP: POSITIVE
PH UR STRIP.AUTO: 5.5 [PH] (ref 5–8)
PHOSPHATE SERPL-MCNC: 3.8 MG/DL (ref 2.5–4.5)
PLATELET # BLD AUTO: 104 10*3/MM3 (ref 140–450)
PMV BLD AUTO: ABNORMAL FL
POTASSIUM SERPL-SCNC: 4.4 MMOL/L (ref 3.5–5.2)
PROT SERPL-MCNC: 5.5 G/DL (ref 6–8.5)
PROT UR QL STRIP: ABNORMAL
RBC # BLD AUTO: 2.68 10*6/MM3 (ref 3.77–5.28)
RBC # UR STRIP: ABNORMAL /HPF
REF LAB TEST METHOD: ABNORMAL
SODIUM SERPL-SCNC: 140 MMOL/L (ref 136–145)
SP GR UR STRIP: 1.02 (ref 1–1.03)
SQUAMOUS #/AREA URNS HPF: ABNORMAL /HPF
TROPONIN T DELTA: 8 NG/L
UROBILINOGEN UR QL STRIP: ABNORMAL
WBC # UR STRIP: ABNORMAL /HPF
WBC NRBC COR # BLD AUTO: 3.64 10*3/MM3 (ref 3.4–10.8)
YEAST URNS QL MICRO: ABNORMAL /HPF

## 2024-08-17 PROCEDURE — 84100 ASSAY OF PHOSPHORUS: CPT | Performed by: INTERNAL MEDICINE

## 2024-08-17 PROCEDURE — G0378 HOSPITAL OBSERVATION PER HR: HCPCS

## 2024-08-17 PROCEDURE — 81001 URINALYSIS AUTO W/SCOPE: CPT | Performed by: INTERNAL MEDICINE

## 2024-08-17 PROCEDURE — 25810000003 LACTATED RINGERS PER 1000 ML: Performed by: INTERNAL MEDICINE

## 2024-08-17 PROCEDURE — 92610 EVALUATE SWALLOWING FUNCTION: CPT | Performed by: SPEECH-LANGUAGE PATHOLOGIST

## 2024-08-17 PROCEDURE — 85025 COMPLETE CBC W/AUTO DIFF WBC: CPT | Performed by: INTERNAL MEDICINE

## 2024-08-17 PROCEDURE — 25010000002 VANCOMYCIN PER 500 MG: Performed by: INTERNAL MEDICINE

## 2024-08-17 PROCEDURE — 80053 COMPREHEN METABOLIC PANEL: CPT | Performed by: INTERNAL MEDICINE

## 2024-08-17 PROCEDURE — 87086 URINE CULTURE/COLONY COUNT: CPT | Performed by: INTERNAL MEDICINE

## 2024-08-17 PROCEDURE — 25010000002 ONDANSETRON PER 1 MG: Performed by: INTERNAL MEDICINE

## 2024-08-17 PROCEDURE — 25010000002 CEFEPIME PER 500 MG: Performed by: INTERNAL MEDICINE

## 2024-08-17 PROCEDURE — 87077 CULTURE AEROBIC IDENTIFY: CPT | Performed by: INTERNAL MEDICINE

## 2024-08-17 PROCEDURE — 83735 ASSAY OF MAGNESIUM: CPT | Performed by: INTERNAL MEDICINE

## 2024-08-17 PROCEDURE — 87186 SC STD MICRODIL/AGAR DIL: CPT | Performed by: INTERNAL MEDICINE

## 2024-08-17 RX ORDER — SODIUM CHLORIDE 9 MG/ML
40 INJECTION, SOLUTION INTRAVENOUS AS NEEDED
Status: DISCONTINUED | OUTPATIENT
Start: 2024-08-17 | End: 2024-08-20 | Stop reason: HOSPADM

## 2024-08-17 RX ORDER — SODIUM CHLORIDE 0.9 % (FLUSH) 0.9 %
10 SYRINGE (ML) INJECTION EVERY 12 HOURS SCHEDULED
Status: DISCONTINUED | OUTPATIENT
Start: 2024-08-17 | End: 2024-08-20 | Stop reason: HOSPADM

## 2024-08-17 RX ORDER — ALLOPURINOL 100 MG/1
100 TABLET ORAL DAILY
Status: DISCONTINUED | OUTPATIENT
Start: 2024-08-17 | End: 2024-08-20 | Stop reason: HOSPADM

## 2024-08-17 RX ORDER — OXYBUTYNIN CHLORIDE 5 MG/1
10 TABLET, EXTENDED RELEASE ORAL DAILY
Status: DISCONTINUED | OUTPATIENT
Start: 2024-08-17 | End: 2024-08-20 | Stop reason: HOSPADM

## 2024-08-17 RX ORDER — ASPIRIN 81 MG/1
324 TABLET, CHEWABLE ORAL ONCE
Status: DISCONTINUED | OUTPATIENT
Start: 2024-08-17 | End: 2024-08-20 | Stop reason: HOSPADM

## 2024-08-17 RX ORDER — ISOSORBIDE MONONITRATE 60 MG/1
60 TABLET, EXTENDED RELEASE ORAL DAILY
Status: DISCONTINUED | OUTPATIENT
Start: 2024-08-17 | End: 2024-08-20 | Stop reason: HOSPADM

## 2024-08-17 RX ORDER — CARVEDILOL 25 MG/1
25 TABLET ORAL 2 TIMES DAILY WITH MEALS
Status: DISCONTINUED | OUTPATIENT
Start: 2024-08-17 | End: 2024-08-20 | Stop reason: HOSPADM

## 2024-08-17 RX ORDER — PANTOPRAZOLE SODIUM 40 MG/1
40 TABLET, DELAYED RELEASE ORAL DAILY
Status: DISCONTINUED | OUTPATIENT
Start: 2024-08-17 | End: 2024-08-20 | Stop reason: HOSPADM

## 2024-08-17 RX ORDER — AMOXICILLIN 250 MG
2 CAPSULE ORAL 2 TIMES DAILY PRN
Status: DISCONTINUED | OUTPATIENT
Start: 2024-08-17 | End: 2024-08-20 | Stop reason: HOSPADM

## 2024-08-17 RX ORDER — SPIRONOLACTONE 25 MG/1
25 TABLET ORAL DAILY
Status: DISCONTINUED | OUTPATIENT
Start: 2024-08-17 | End: 2024-08-20 | Stop reason: HOSPADM

## 2024-08-17 RX ORDER — LEFLUNOMIDE 20 MG/1
20 TABLET ORAL DAILY
Status: DISCONTINUED | OUTPATIENT
Start: 2024-08-17 | End: 2024-08-20 | Stop reason: HOSPADM

## 2024-08-17 RX ORDER — BISACODYL 5 MG/1
5 TABLET, DELAYED RELEASE ORAL DAILY PRN
Status: DISCONTINUED | OUTPATIENT
Start: 2024-08-17 | End: 2024-08-20 | Stop reason: HOSPADM

## 2024-08-17 RX ORDER — DULOXETIN HYDROCHLORIDE 30 MG/1
60 CAPSULE, DELAYED RELEASE ORAL DAILY
Status: DISCONTINUED | OUTPATIENT
Start: 2024-08-17 | End: 2024-08-20 | Stop reason: HOSPADM

## 2024-08-17 RX ORDER — SODIUM CHLORIDE 0.9 % (FLUSH) 0.9 %
10 SYRINGE (ML) INJECTION AS NEEDED
Status: DISCONTINUED | OUTPATIENT
Start: 2024-08-17 | End: 2024-08-20 | Stop reason: HOSPADM

## 2024-08-17 RX ORDER — ACETAMINOPHEN 325 MG/1
650 TABLET ORAL EVERY 4 HOURS PRN
Status: DISCONTINUED | OUTPATIENT
Start: 2024-08-17 | End: 2024-08-20 | Stop reason: HOSPADM

## 2024-08-17 RX ORDER — BISACODYL 10 MG
10 SUPPOSITORY, RECTAL RECTAL DAILY PRN
Status: DISCONTINUED | OUTPATIENT
Start: 2024-08-17 | End: 2024-08-20 | Stop reason: HOSPADM

## 2024-08-17 RX ORDER — ONDANSETRON 2 MG/ML
4 INJECTION INTRAMUSCULAR; INTRAVENOUS EVERY 6 HOURS PRN
Status: DISCONTINUED | OUTPATIENT
Start: 2024-08-17 | End: 2024-08-20 | Stop reason: HOSPADM

## 2024-08-17 RX ORDER — SODIUM CHLORIDE 450 MG/100ML
75 INJECTION, SOLUTION INTRAVENOUS CONTINUOUS
Status: DISCONTINUED | OUTPATIENT
Start: 2024-08-17 | End: 2024-08-17

## 2024-08-17 RX ORDER — POLYETHYLENE GLYCOL 3350 17 G/17G
17 POWDER, FOR SOLUTION ORAL DAILY PRN
Status: DISCONTINUED | OUTPATIENT
Start: 2024-08-17 | End: 2024-08-20 | Stop reason: HOSPADM

## 2024-08-17 RX ORDER — SODIUM CHLORIDE, SODIUM LACTATE, POTASSIUM CHLORIDE, CALCIUM CHLORIDE 600; 310; 30; 20 MG/100ML; MG/100ML; MG/100ML; MG/100ML
100 INJECTION, SOLUTION INTRAVENOUS CONTINUOUS
Status: DISCONTINUED | OUTPATIENT
Start: 2024-08-17 | End: 2024-08-20 | Stop reason: HOSPADM

## 2024-08-17 RX ORDER — LOSARTAN POTASSIUM 50 MG/1
50 TABLET ORAL DAILY
Status: DISCONTINUED | OUTPATIENT
Start: 2024-08-17 | End: 2024-08-20 | Stop reason: HOSPADM

## 2024-08-17 RX ORDER — LEVOTHYROXINE SODIUM 75 UG/1
75 TABLET ORAL
Status: DISCONTINUED | OUTPATIENT
Start: 2024-08-17 | End: 2024-08-20 | Stop reason: HOSPADM

## 2024-08-17 RX ADMIN — ONDANSETRON 4 MG: 2 INJECTION INTRAMUSCULAR; INTRAVENOUS at 09:13

## 2024-08-17 RX ADMIN — CEFEPIME 2000 MG: 2 INJECTION, POWDER, FOR SOLUTION INTRAVENOUS at 17:53

## 2024-08-17 RX ADMIN — APIXABAN 5 MG: 5 TABLET, FILM COATED ORAL at 21:42

## 2024-08-17 RX ADMIN — LEVOTHYROXINE SODIUM 75 MCG: 75 TABLET ORAL at 06:13

## 2024-08-17 RX ADMIN — SODIUM CHLORIDE, POTASSIUM CHLORIDE, SODIUM LACTATE AND CALCIUM CHLORIDE 100 ML/HR: 600; 310; 30; 20 INJECTION, SOLUTION INTRAVENOUS at 21:42

## 2024-08-17 RX ADMIN — DULOXETINE HYDROCHLORIDE 60 MG: 30 CAPSULE, DELAYED RELEASE ORAL at 08:14

## 2024-08-17 RX ADMIN — LEFLUNOMIDE 20 MG: 20 TABLET ORAL at 08:13

## 2024-08-17 RX ADMIN — SODIUM CHLORIDE 75 ML/HR: 4.5 INJECTION, SOLUTION INTRAVENOUS at 03:05

## 2024-08-17 RX ADMIN — ALLOPURINOL 100 MG: 100 TABLET ORAL at 08:14

## 2024-08-17 RX ADMIN — ISOSORBIDE MONONITRATE 60 MG: 60 TABLET, EXTENDED RELEASE ORAL at 08:14

## 2024-08-17 RX ADMIN — VANCOMYCIN HYDROCHLORIDE 500 MG: 500 INJECTION, POWDER, LYOPHILIZED, FOR SOLUTION INTRAVENOUS at 17:07

## 2024-08-17 RX ADMIN — ACETAMINOPHEN 650 MG: 325 TABLET ORAL at 21:42

## 2024-08-17 RX ADMIN — SODIUM CHLORIDE, POTASSIUM CHLORIDE, SODIUM LACTATE AND CALCIUM CHLORIDE 100 ML/HR: 600; 310; 30; 20 INJECTION, SOLUTION INTRAVENOUS at 09:14

## 2024-08-17 RX ADMIN — SPIRONOLACTONE 25 MG: 25 TABLET ORAL at 08:13

## 2024-08-17 RX ADMIN — PANTOPRAZOLE SODIUM 40 MG: 40 TABLET, DELAYED RELEASE ORAL at 08:13

## 2024-08-17 RX ADMIN — Medication 10 ML: at 08:14

## 2024-08-17 RX ADMIN — CARVEDILOL 25 MG: 25 TABLET, FILM COATED ORAL at 08:14

## 2024-08-17 RX ADMIN — APIXABAN 5 MG: 5 TABLET, FILM COATED ORAL at 08:14

## 2024-08-17 RX ADMIN — OXYBUTYNIN CHLORIDE 10 MG: 5 TABLET, EXTENDED RELEASE ORAL at 08:14

## 2024-08-17 RX ADMIN — CEFEPIME 2000 MG: 2 INJECTION, POWDER, FOR SOLUTION INTRAVENOUS at 09:13

## 2024-08-17 RX ADMIN — LOSARTAN POTASSIUM 50 MG: 50 TABLET, FILM COATED ORAL at 08:14

## 2024-08-17 NOTE — CONSULTS
[facility] HEART GROUP CONSULT NOTE      Reason for Consultation: Elevated troponin    Patient Care Team:  Moises Oliveira MD as PCP - General (Internal Medicine)  Moises Holman MD as Consulting Physician (Otolaryngology)  Christiano Rao PA as Physician Assistant (Otolaryngology)  Candelaria David MD as Obstetrician (Obstetrics and Gynecology)  Omar Hernandez MD as Cardiologist (Cardiology)  Leta Crawford APRN as Nurse Practitioner (Nurse Practitioner)  Leta Crawford APRN as Nurse Practitioner (Nurse Practitioner)    Chief complaint confusion, weakness    Subjective .     History of present illness: This patient is a 69-year-old white female who is chronically ill with multiple comorbidities.  Her cardiac history includes coronary artery disease status post PCI in the past, chronic diastolic heart failure, and sick sinus syndrome, status post pacemaker.  She has RA and is chronically immobile.  She has a chronic wound a history of recurrent UTIs.  She prevents to ER with fever, decreased appetite, confusion, weakness, and generally not feeling well.  She remains somewhat confused and history seems somewhat unreliable.  I have obtained history from caregivers, RN, and EMR.  She has been diagnosed with UTI and PINKY.  Troponin was elevated, and cardiology consult was obtained.  Patient and caregivers deny any chest pain whatsoever.  She has no significant shortness of breath, orthopnea or PND.  She has no heart palpitations or syncope.  Of note, patient was seen in cardiology clinic this week and denied any S&S concerning for myocardial ischemia.    Review of Systems  A 10-point review of systems is obtained and negative except for otherwise mentioned above.    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    Elevated cholesterol     Eustachian tube dysfunction     Heart disease     Herpes simplex     History of transfusion     Hyperlipidemia     Hypertension     Hypothyroidism 12/23/2020    Intrinsic asthma 12/23/2020    Knee dislocation     Labral tear of right hip joint     Laryngitis sicca     Laryngitis, chronic     Left carotid bruit 03/09/2016    MI (myocardial infarction)     Myalgia due to statin 06/25/2019    Open wound of right hip 09/14/2021    Osteomyelitis of right femur 07/06/2021    Otorrhea     Pacemaker 11/17/2016    Primary osteoarthritis of left knee 12/23/2020    Psoriasis vulgaris 12/23/2020    S/P coronary artery stent placement 03/09/2016    Sensorineural hearing loss     Seropositive rheumatoid arthritis of multiple sites 12/27/2019    Overview:  -myochrysine '93-'96 -methotrexate '96--->11/98;r/s  restarted 2/99--> 8/14 (anemia) -sulfasalazine- not effective -penicillamine 6/98-->10/98; no effect -leflunomide 11/98--> - Humira '13-->didn't take - Enbrel 12/14-->3/15- no effect!   Last Assessment & Plan:  - \"aching all over\" because she had to be off her anti-rheumatic drugs for 2 weeks in preparation for her R knee surgery - he    Sick sinus syndrome 12/27/2019    Sjogren's disease     Spondylolisthesis of lumbar region 01/17/2018    Syncope, recurrent 02/08/2021    Urinary tract infection     UTI (urinary tract infection) 04/19/2024    UTI (urinary tract infection)    ,   Past Surgical History:   Procedure Laterality Date    A-V CARDIAC PACEMAKER INSERTION  2016    ATRIAL CARDIAC PACEMAKER INSERTION      CARDIAC CATHETERIZATION      " CATARACT EXTRACTION      CERVICAL CORPECTOMY N/A 03/03/2021    Procedure: CERVICAL 6 CORPECTOMY WITH TITANIUM CAGE WITH NEURO MONITORING;  Surgeon: Bandar Shea MD;  Location: [facility] OR;  Service: Neurosurgery;  Laterality: N/A;    CHOLECYSTECTOMY WITH INTRAOPERATIVE CHOLANGIOGRAM N/A 07/03/2024    Procedure: CHOLECYSTECTOMY LAPAROSCOPIC WITH INTRAOPERATIVE CHOLANGIOGRAM;  Surgeon: Moises Keyes MD;  Location:  [facility] OR;  Service: General;  Laterality: N/A;    COLONOSCOPY  11/08/2011    One fold in the ascending colon which showed ulcer otherwise normal exam    COLONOSCOPY  11/12/2004    Normal exam repeat in five years    CORONARY ANGIOPLASTY WITH STENT PLACEMENT      X 2; 2013 & 2014    CYSTOSCOPY BLADDER STONE LITHOTRIPSY      ENDOSCOPY  07/10/2014    Normal exam    ENDOSCOPY N/A 05/30/2024    Procedure: ESOPHAGOGASTRODUODENOSCOPY WITH ANESTHESIA;  Surgeon: Taiwo Sanchez MD;  Location: [facility] ENDOSCOPY;  Service: Gastroenterology;  Laterality: N/A;  preop; dysphagia  postop: balloon dilation  PCP Jesus Manuel jeff    FLAP LEG Right 09/14/2021    Procedure: RIGHT GLUTEAL FASCIOCUTANEOUS ADVANCEMENT FLAP AND RIGHT TENSOR FASCIAL JESSICA FLAP;  Surgeon: Amadeo Turner MD;  Location: [facility] OR;  Service: Plastics;  Laterality: Right;    GALLBLADDER SURGERY      HIP ABDUCTION TENOTOMY BILATERAL Right 01/14/2021    Procedure: RIGHT HIP GLUTEUS MEDLUS / MINIMUS REPAIR, POSSIBLE ACHILLES ALLOGRAFT;  Surgeon: Nino Carlson MD;  Location: [facility] OR;  Service: Orthopedics;  Laterality: Right;    INCISION AND DRAINAGE ABSCESS Right 06/04/2022    Procedure: INCISION AND DRAINAGE ABSCESS right hip;  Surgeon: Magda Salcido MD;  Location: [facility] OR;  Service: General;  Laterality: Right;    INCISION AND DRAINAGE ABSCESS Right 06/10/2022    Procedure: RIGHT HIP INCISION AND DRAINAGE. MD NEEDS 3L VANC IRRIGATION, CURRETTES, DAICANS, KERLEX ROLLS;  Surgeon: Amadeo Turner MD;  Location:  [facility] OR;  Service:  Plastics;  Laterality: Right;    INCISION AND DRAINAGE HIP Right 02/09/2021    Procedure: HIP INCISION AND DRAINAGE;  Surgeon: Nino Carlson MD;  Location:  [facility] OR;  Service: Orthopedics;  Laterality: Right;    INCISION AND DRAINAGE LEG Right 10/24/2021    Procedure: INCISION AND DRAINAGE LOWER EXTREMITY;  Surgeon: Amadeo Turner MD;  Location:  [facility] OR;  Service: Plastics;  Laterality: Right;    INCISION AND DRAINAGE OF WOUND Right 07/08/2021    Procedure: INCISION AND DRAINAGE WOUND RIGHT HIP;  Surgeon: James Huntley MD;  Location:  [facility] OR;  Service: Orthopedics;  Laterality: Right;    JOINT REPLACEMENT      KYPHOPLASTY WITH BIOPSY Bilateral 10/26/2021    Procedure: THOARCIC 12 KYPHOPLASTY WITH BIOPSY;  Surgeon: Bandar Shea MD;  Location:  [facility] OR;  Service: Neurosurgery;  Laterality: Bilateral;    LEG DEBRIDEMENT Right 09/14/2021    Procedure: DEBRIDEMENT OF RIGHT HIP WOUND, RIGHT GLUTEAL FASCIOCUTANEOUS ADVANCEMENT FLAP AND RIGHT TENSOR FASCIAL JESSICA FLAP;  Surgeon: Amadeo Turner MD;  Location:  [facility] OR;  Service: Plastics;  Laterality: Right;    LUMBAR DISCECTOMY Right 03/23/2021    Procedure: LUMBAR DISCECTOMY MICRO, Lumbar 1/2 right;  Surgeon: Bandar Shea MD;  Location:  [facility] OR;  Service: Neurosurgery;  Laterality: Right;    LUMBAR FUSION N/A 01/19/2018    Procedure: L3-4,L4-5 DECOMPRESSION, POSTERIOR SPINAL FUSION WITH INSTRUMENTATION;  Surgeon: Fortino Oropeza MD;  Location:  [facility] OR;  Service:     LUMBAR LAMINECTOMY WITH FUSION Left 01/17/2018    Procedure: LEFT L3-4 L4-5 LATERAL LUMBAR INTERBODY FUSION;  Surgeon: Fortino Oropeza MD;  Location:  [facility] OR;  Service:     MASS EXCISION Right 04/23/2024    Procedure: RIGHT BUTTOCK MASS EXCISION;  Surgeon: Moises Keyes MD;  Location:  [facility] OR;  Service: General;  Laterality: Right;    MYRINGOTOMY W/ TUBES  09/04/2014    TUBES NO LONGER IN PLACE    OTHER SURGICAL HISTORY      total knee was infected  twice so hardware was removed and spacers were placed    REPLACEMENT TOTAL KNEE Right     URETEROSCOPY LASER LITHOTRIPSY WITH STENT INSERTION N/A 06/21/2024    Procedure: URETEROSCOPY LASER LITHOTRIPSY WITH STENT INSERTION LEFT;  Surgeon: Ky Plascencia MD;  Location: [facility];  Service: Urology;  Laterality: N/A;   ,   Family History   Problem Relation Age of Onset    Cancer Mother         Lung cancer    Heart disease Father         Doied of heart. Attack   ,   Social History     Tobacco Use    Smoking status: Never     Passive exposure: Never    Smokeless tobacco: Never   Vaping Use    Vaping status: Never Used   Substance Use Topics    Alcohol use: No    Drug use: Never   ,   Medications Prior to Admission   Medication Sig Dispense Refill Last Dose    acetaminophen (TYLENOL) 325 MG tablet Take 2 tablets by mouth Every 6 (Six) Hours As Needed for Mild Pain (mild pain). Indications: Fever, Pain       allopurinol (ZYLOPRIM) 100 MG tablet Take 1 tablet by mouth Daily. 30 tablet 0     apixaban (Eliquis) 5 MG tablet tablet Take 1 tablet by mouth 2 (Two) Times a Day.       Ascorbic Acid (Vitamin C) 500 MG capsule Take 1 tablet by mouth Daily. Indications: Inadequate Vitamin C       aspirin 81 MG EC tablet Take 1 tablet by mouth Daily. Indications: Buildup of Plaques in Large Arteries Leading to the Brain       colchicine 0.6 MG tablet Take 1 tablet by mouth Daily.       Diclofenac Sodium (VOLTAREN) 1 % gel gel Apply 4 g topically to the appropriate area as directed 4 (Four) Times a Day As Needed (Mild pain). Indications: Joint Damage causing Pain and Loss of Function       DULoxetine (CYMBALTA) 60 MG capsule Take 1 capsule by mouth Daily. Indications: Musculoskeletal Pain       estradiol (ESTRACE) 0.1 MG/GM vaginal cream Insert 2 g into the vagina Daily. 42.5 g 0     Evolocumab (REPATHA) solution prefilled syringe injection Inject 1 mL under the skin into the appropriate area as directed Every 14 (Fourteen) Days. 2  mL 11     ferrous sulfate 324 (65 Fe) MG tablet delayed-release EC tablet Take 1 tablet by mouth Daily With Breakfast. Indications: Iron Deficiency       folic acid (FOLVITE) 1 MG tablet Take 1 tablet by mouth Daily. Indications: Anemia From Inadequate Folic Acid       furosemide (LASIX) 40 MG tablet Take 1 tablet by mouth As Needed (shortness of breath). Indications: Cardiac Failure, Edema       Hydrocortisone (sam's amazing butt) cream Apply 1 Application topically to the appropriate area as directed As Needed. Recipe Contains: 1:1:1:1 of hydrocortisone 1% oint, bacitracin 500 unit/gram oint, zinc 20% oint, nystatin 100,000 unit/gram oint       leflunomide (ARAVA) 20 MG tablet Take 1 tablet by mouth every night at bedtime. Indications: Rheumatoid Arthritis 90 tablet 3     levothyroxine (SYNTHROID, LEVOTHROID) 75 MCG tablet Take 1 tablet by mouth Daily. Indications: Underactive Thyroid       lidocaine (Lidoderm) 5 % Place 1 patch on the skin as directed by provider Daily As Needed for Mild Pain. Remove & Discard patch within 12 hours or as directed by MD 30 each 2     losartan (COZAAR) 50 MG tablet Take 1 tablet by mouth Daily. Indications: High Blood Pressure Disorder       methenamine (HIPREX) 1 g tablet Take 1 tablet by mouth 2 (Two) Times a Day With Meals. Indications: Urinary Tract Infection       miconazole (MICOTIN) 2 % powder Apply 1 Application topically to the appropriate area as directed As Needed.       multivitamin with minerals tablet tablet Take 1 tablet by mouth Daily.       pantoprazole (Protonix) 40 MG EC tablet Take 1 tablet by mouth Daily for 90 days. 90 tablet 0     predniSONE (DELTASONE) 5 MG tablet Take 1 tablet by mouth Daily. Indications: Acute Joint Inflammation in Gout       spironolactone (ALDACTONE) 25 MG tablet Take 1 tablet by mouth Daily. 30 tablet 11     traMADol (ULTRAM) 50 MG tablet Take 1 tablet by mouth Every 12 (Twelve) Hours As Needed for Moderate Pain. Indications: Pain     "   valACYclovir (VALTREX) 500 MG tablet Take 1 tablet by mouth Daily.       carvedilol (COREG) 25 MG tablet Take 1 tablet by mouth 2 (Two) Times a Day With Meals. Indications: High Blood Pressure Disorder       isosorbide mononitrate (IMDUR) 60 MG 24 hr tablet Take 1 tablet by mouth Daily. Indications: Stable Angina Pectoris       nitroglycerin (NITROSTAT) 0.4 MG SL tablet Place 1 tablet under the tongue Every 5 (Five) Minutes As Needed for Chest Pain. Take no more than 3 doses in 15 minutes.  Indications: Acute Angina Pectoris       ondansetron ODT (ZOFRAN-ODT) 4 MG disintegrating tablet Place 1 tablet on the tongue Every 8 (Eight) Hours As Needed for Nausea or Vomiting. 30 tablet 1     oxybutynin XL (Ditropan XL) 10 MG 24 hr tablet Take 1 tablet by mouth Daily. 30 tablet 11     and Allergies:  Atorvastatin, Amoxicillin, Escitalopram, Nabumetone, Niacin er, Penicillin g, Penicillins, and Simvastatin    Objective     Vital Sign Min/Max for last 24 hours  Temp  Min: 97.3 °F (36.3 °C)  Max: 101.7 °F (38.7 °C)   BP  Min: 108/51  Max: 164/71   Pulse  Min: 72  Max: 94   Resp  Min: 20  Max: 24   SpO2  Min: 96 %  Max: 99 %   No data recorded   Weight  Min: 86.6 kg (191 lb)  Max: 88.9 kg (195 lb 15.8 oz)     Flowsheet Rows      Flowsheet Row First Filed Value   Admission Height 160 cm (63\") Documented at 08/16/2024 2032   Admission Weight 86.6 kg (191 lb) Documented at 08/16/2024 2032               Physical Exam:     General Appearance:    NAD, chronically ill-appearing, confused   Head:    Normocephalic, without obvious abnormality, atraumatic   Eyes:            Lids and lashes normal, conjunctivae and sclerae normal, no   icterus, PERRLA, EOMI   Throat:   Oral mucosa pink and moist   Neck:   No adenopathy, supple, trachea midline, no thyromegaly, no   carotid bruit, no JVD   Lungs:     Clear to auscultation bilaterally,respirations regular, even     and unlabored    Heart:    Regular rhythm and normal rate, normal S1 and " S2, no            murmur, no gallop, no rub, no click   Chest Wall:    No abnormalities observed   Abdomen:     Normal bowel sounds present in all four quadrants, no       masses, no organomegaly, soft non-tender, non-distended    Extremities:   No edema, no cyanosis, no clubbing   Pulses:   Pulses palpable and equal bilaterally   Skin:   No bleeding, bruising or rash   Psychiatric:   Confused           Results Review:    I reviewed the patient's new clinical results.    Results from last 7 days   Lab Units 08/17/24  0422   WBC 10*3/mm3 3.64   HEMOGLOBIN g/dL 7.7*   HEMATOCRIT % 25.7*   PLATELETS 10*3/mm3 104*     Results from last 7 days   Lab Units 08/17/24  0422   SODIUM mmol/L 140   POTASSIUM mmol/L 4.4   CHLORIDE mmol/L 108*   CO2 mmol/L 19.0*   BUN mg/dL 30*   CREATININE mg/dL 1.99*   GLUCOSE mg/dL 102*   CALCIUM mg/dL 8.3*     Results from last 7 days   Lab Units 08/17/24  0422   SODIUM mmol/L 140   POTASSIUM mmol/L 4.4   CHLORIDE mmol/L 108*   CO2 mmol/L 19.0*   BUN mg/dL 30*   CREATININE mg/dL 1.99*   CALCIUM mg/dL 8.3*   BILIRUBIN mg/dL 0.4   ALK PHOS U/L 116   ALT (SGPT) U/L 23   AST (SGOT) U/L 45*   GLUCOSE mg/dL 102*     Results from last 7 days   Lab Units 08/16/24  2338 08/16/24  2123   HSTROP T ng/L 78* 70*       Assessment & Plan     Elevated troponin:  Patient, caregivers, and RN deny the patient having any complaints concerning for ACS.  This is likely myocardial injury and not ischemia in the setting of UTI, PINKY, and anemia.  No further cardiac testing is warranted at this time.    Urinary tract infection:  as per primary team    Acute kidney injury:  as per primary team    CAD, sp PCI:  Stable.  Continue Coreg, Imdur, and PCSK9 inhibitor.  Patient is on Eliquis due to h/o DVT.    Chronic diastolic heart failure:  Losartan and spironolactone is held d/t PINKY.  Lasix PRN.  Patient is clinically euvolemic.      Sick sinus syndrome, s/p PPM:  Patient is monitored regularly in office.    Essential  hypertension:  BP medications held d/t PINKY.    Hyperlipidemia:  PCSK9 inhibitor as OP.    H/o DVT on chronic anticoagulation:  Anticoagulated with Eliquis.    Thank you kindly for the consultation and opportunity to help care for your patient.  Further recommendations will follow from Dr. Holloway.  Please never hesitate to reach out with further questions or concerns.      I discussed the patient's findings and my recommendations with patient, family, nursing staff, and consulting provider    Sloane Anna PA-C  08/17/24  10:28 CDT

## 2024-08-17 NOTE — H&P
[facility] Medicine Services  HISTORY AND PHYSICAL    Date of Admission: 8/16/2024  Primary Care Physician: Moises Oliveira MD    Subjective   Primary Historian: Sister at bedside    Chief Complaint: Confusion    Fever     69-year-old female who presents emergency department with fever and confusion.  The patient is unable to give any history.  The patient has a full-time caregiver.  The patient's sister is at bedside and gives history.  They note that the patient has had fever, decreased appetite, and generalized weakness that is worsened over the last 2 days.  She has had history of urinary tract infections in the past.  The patient is currently nonmobile, but does sit in a wheelchair during the day.    6/13/24 cardiac echo:  Interpretation Summary    Left ventricular systolic function is normal. Left ventricular ejection fraction appears to be 61 - 65%.    Left ventricular wall thickness is consistent with mild concentric hypertrophy.    The left atrial cavity is mildly dilated.    Normal size and function of the right ventricle.    No significant valvular pathology.    Compared to prior exam from 4/24/2022, no significant orozco    Review of Systems   Constitutional:  Positive for fever.   Psychiatric/Behavioral:  Positive for confusion.       Otherwise complete ROS reviewed and negative except as mentioned in the HPI.    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/2020    Elevated cholesterol     Eustachian tube dysfunction     Heart disease     Herpes simplex     History of transfusion     Hyperlipidemia     Hypertension     Hypothyroidism 12/23/2020    Intrinsic asthma 12/23/2020    Knee dislocation     Labral tear of right hip joint     Laryngitis sicca     Laryngitis, chronic     Left carotid bruit 03/09/2016    MI (myocardial infarction)     Myalgia due to statin 06/25/2019    Open wound of right hip 09/14/2021    Osteomyelitis of right femur 07/06/2021    Otorrhea     Pacemaker 11/17/2016    Primary osteoarthritis of left knee 12/23/2020    Psoriasis vulgaris 12/23/2020    S/P coronary artery stent placement 03/09/2016    Sensorineural hearing loss     Seropositive rheumatoid arthritis of multiple sites 12/27/2019    Overview:  -myochrysine '93-'96 -methotrexate '96--->11/98;r/s  restarted 2/99--> 8/14 (anemia) -sulfasalazine- not effective -penicillamine 6/98-->10/98; no effect -leflunomide 11/98--> - Humira '13-->didn't take - Enbrel 12/14-->3/15- no effect!   Last Assessment & Plan:  - \"aching all over\" because she had to be off her anti-rheumatic drugs for 2 weeks in preparation for her R knee surgery - he    Sick sinus syndrome 12/27/2019    Sjogren's disease     Spondylolisthesis of lumbar region 01/17/2018    Syncope, recurrent 02/08/2021    Urinary tract infection     UTI (urinary tract infection) 04/19/2024     Past Surgical History:  Past Surgical History:   Procedure Laterality Date    A-V CARDIAC PACEMAKER INSERTION  2016    ATRIAL CARDIAC PACEMAKER INSERTION      CARDIAC CATHETERIZATION      CATARACT EXTRACTION      CERVICAL CORPECTOMY N/A 3/3/2021    Procedure: CERVICAL 6 CORPECTOMY WITH TITANIUM CAGE WITH NEURO MONITORING;  Surgeon: Bandar Shea MD;  Location: [facility];  Service: Neurosurgery;  Laterality: N/A;    CHOLECYSTECTOMY WITH INTRAOPERATIVE CHOLANGIOGRAM N/A 7/3/2024    Procedure: CHOLECYSTECTOMY LAPAROSCOPIC " WITH INTRAOPERATIVE CHOLANGIOGRAM;  Surgeon: Moises Keyes MD;  Location: [facility] OR;  Service: General;  Laterality: N/A;    COLONOSCOPY  11/08/2011    One fold in the ascending colon which showed ulcer otherwise normal exam    COLONOSCOPY  11/12/2004    Normal exam repeat in five years    CORONARY ANGIOPLASTY WITH STENT PLACEMENT      X 2; 2013 & 2014    ENDOSCOPY  07/10/2014    Normal exam    ENDOSCOPY N/A 5/30/2024    Procedure: ESOPHAGOGASTRODUODENOSCOPY WITH ANESTHESIA;  Surgeon: Taiwo Sanchez MD;  Location: [facility] ENDOSCOPY;  Service: Gastroenterology;  Laterality: N/A;  preop; dysphagia  postop: balloon dilation  PCP Jesus Manuel jeff    FLAP LEG Right 9/14/2021    Procedure: RIGHT GLUTEAL FASCIOCUTANEOUS ADVANCEMENT FLAP AND RIGHT TENSOR FASCIAL JESSICA FLAP;  Surgeon: Amadeo Turner MD;  Location: [facility] OR;  Service: Plastics;  Laterality: Right;    HIP ABDUCTION TENOTOMY BILATERAL Right 1/14/2021    Procedure: RIGHT HIP GLUTEUS MEDLUS / MINIMUS REPAIR, POSSIBLE ACHILLES ALLOGRAFT;  Surgeon: Nino Carlson MD;  Location:  [facility] OR;  Service: Orthopedics;  Laterality: Right;    INCISION AND DRAINAGE ABSCESS Right 6/4/2022    Procedure: INCISION AND DRAINAGE ABSCESS right hip;  Surgeon: Magda Salcido MD;  Location:  [facility] OR;  Service: General;  Laterality: Right;    INCISION AND DRAINAGE ABSCESS Right 6/10/2022    Procedure: RIGHT HIP INCISION AND DRAINAGE. MD NEEDS 3L VANC IRRIGATION, CURRETTES, DAICANS, KERLEX ROLLS;  Surgeon: Amadeo Turner MD;  Location:  [facility] OR;  Service: Plastics;  Laterality: Right;    INCISION AND DRAINAGE HIP Right 2/9/2021    Procedure: HIP INCISION AND DRAINAGE;  Surgeon: Nino Carlson MD;  Location: [facility] OR;  Service: Orthopedics;  Laterality: Right;    INCISION AND DRAINAGE LEG Right 10/24/2021    Procedure: INCISION AND DRAINAGE LOWER EXTREMITY;  Surgeon: Amadeo Turner MD;  Location: [facility] OR;  Service: Plastics;  Laterality: Right;    INCISION AND DRAINAGE OF  WOUND Right 7/8/2021    Procedure: INCISION AND DRAINAGE WOUND RIGHT HIP;  Surgeon: James Huntley MD;  Location:  [facility] OR;  Service: Orthopedics;  Laterality: Right;    JOINT REPLACEMENT      KYPHOPLASTY WITH BIOPSY Bilateral 10/26/2021    Procedure: THOARCIC 12 KYPHOPLASTY WITH BIOPSY;  Surgeon: Bandar Shea MD;  Location:  [facility] OR;  Service: Neurosurgery;  Laterality: Bilateral;    LEG DEBRIDEMENT Right 9/14/2021    Procedure: DEBRIDEMENT OF RIGHT HIP WOUND, RIGHT GLUTEAL FASCIOCUTANEOUS ADVANCEMENT FLAP AND RIGHT TENSOR FASCIAL JESSICA FLAP;  Surgeon: Amadeo Turner MD;  Location:  [facility] OR;  Service: Plastics;  Laterality: Right;    LUMBAR DISCECTOMY Right 3/23/2021    Procedure: LUMBAR DISCECTOMY MICRO, Lumbar 1/2 right;  Surgeon: Bandar Shea MD;  Location:  [facility] OR;  Service: Neurosurgery;  Laterality: Right;    LUMBAR FUSION N/A 1/19/2018    Procedure: L3-4,L4-5 DECOMPRESSION, POSTERIOR SPINAL FUSION WITH INSTRUMENTATION;  Surgeon: Fortino Oropeza MD;  Location:  [facility] OR;  Service:     LUMBAR LAMINECTOMY WITH FUSION Left 1/17/2018    Procedure: LEFT L3-4 L4-5 LATERAL LUMBAR INTERBODY FUSION;  Surgeon: Fortino Oropeza MD;  Location:  [facility] OR;  Service:     MASS EXCISION Right 4/23/2024    Procedure: RIGHT BUTTOCK MASS EXCISION;  Surgeon: Moises Keyes MD;  Location:  [facility] OR;  Service: General;  Laterality: Right;    MYRINGOTOMY W/ TUBES  09/04/2014    TUBES NO LONGER IN PLACE    OTHER SURGICAL HISTORY      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 so hardware was removed and spacers were placed    REPLACEMENT TOTAL KNEE Right     URETEROSCOPY LASER LITHOTRIPSY WITH STENT INSERTION N/A 6/21/2024    Procedure: URETEROSCOPY LASER LITHOTRIPSY WITH STENT INSERTION LEFT;  Surgeon: Ky Plascencia MD;  Location:  [facility] OR;  Service: Urology;  Laterality: N/A;     Social History:  reports that she has never smoked. She has never been exposed to tobacco smoke. She has never used  smokeless tobacco. She reports that she does not drink alcohol and does not use drugs.    Family History: family history includes Cancer in her mother; Heart disease in her father.       Allergies:  Allergies   Allergen Reactions    Atorvastatin Other (See Comments)     LEG CRAMPS      Amoxicillin Rash    Escitalopram Rash    Nabumetone Rash    Niacin Er Rash    Penicillin G Rash    Penicillins Rash    Simvastatin Rash       Medications:  Prior to Admission medications    Medication Sig Start Date End Date Taking? Authorizing Provider   acetaminophen (TYLENOL) 325 MG tablet Take 2 tablets by mouth Every 6 (Six) Hours As Needed for Mild Pain (mild pain). Indications: Fever, Pain 6/4/22   Leila Daly MD   allopurinol (ZYLOPRIM) 100 MG tablet Take 1 tablet by mouth Daily. 7/9/24   Gus Feldman MD   apixaban (Eliquis) 5 MG tablet tablet Take 1 tablet by mouth 2 (Two) Times a Day. 4/23/24   Nayan Hale DO   Ascorbic Acid (Vitamin C) 500 MG capsule Take 1 tablet by mouth Daily. Indications: Inadequate Vitamin C 5/16/24   Leila Daly MD   aspirin 81 MG EC tablet Take 1 tablet by mouth Daily. Indications: Buildup of Plaques in Large Arteries Leading to the Brain 6/1/24   Leila Daly MD   carvedilol (COREG) 25 MG tablet Take 1 tablet by mouth 2 (Two) Times a Day With Meals. Indications: High Blood Pressure Disorder 4/21/24   Leila Daly MD   colchicine 0.6 MG tablet Take 1 tablet by mouth Daily. 7/26/24   Leila Daly MD   Diclofenac Sodium (VOLTAREN) 1 % gel gel Apply 4 g topically to the appropriate area as directed 4 (Four) Times a Day As Needed (Mild pain). Indications: Joint Damage causing Pain and Loss of Function 11/4/21   Leila Daly MD   DULoxetine (CYMBALTA) 60 MG capsule Take 1 capsule by mouth Daily. Indications: Musculoskeletal Pain 4/21/24   Leila Daly MD   estradiol (ESTRACE) 0.1 MG/GM vaginal cream Insert 2 g into the vagina  Daily. 5/31/24   Kris Cade MD   Evolocumab (REPATHA) solution prefilled syringe injection Inject 1 mL under the skin into the appropriate area as directed Every 14 (Fourteen) Days. 8/14/24   Ximena Vazquez APRN   ferrous sulfate 324 (65 Fe) MG tablet delayed-release EC tablet Take 1 tablet by mouth Daily With Breakfast. Indications: Iron Deficiency 4/21/24   Leila Daly MD   folic acid (FOLVITE) 1 MG tablet Take 1 tablet by mouth Daily. Indications: Anemia From Inadequate Folic Acid 4/21/24   Leila Daly MD   furosemide (LASIX) 40 MG tablet Take 1 tablet by mouth As Needed (shortness of breath). Indications: Cardiac Failure, Edema 5/25/24   Leila Daly MD   isosorbide mononitrate (IMDUR) 60 MG 24 hr tablet Take 1 tablet by mouth Daily. Indications: Stable Angina Pectoris 4/21/24   Leila Daly MD   leflunomide (ARAVA) 20 MG tablet Take 1 tablet by mouth every night at bedtime. Indications: Rheumatoid Arthritis 2/6/23   Moises Oliveira MD   levothyroxine (SYNTHROID, LEVOTHROID) 75 MCG tablet Take 1 tablet by mouth Daily. Indications: Underactive Thyroid 5/5/24   Leila Daly MD   lidocaine (Lidoderm) 5 % Place 1 patch on the skin as directed by provider Daily As Needed for Mild Pain. Remove & Discard patch within 12 hours or as directed by MD 4/9/24   Niki Palma APRN   losartan (COZAAR) 50 MG tablet Take 1 tablet by mouth Daily. Indications: High Blood Pressure Disorder 4/21/24   Leila Daly MD   methenamine (HIPREX) 1 g tablet Take 1 tablet by mouth 2 (Two) Times a Day With Meals. Indications: Urinary Tract Infection 6/13/24   Leila Daly MD   miconazole (MICOTIN) 2 % powder Apply 1 Application topically to the appropriate area as directed As Needed. 7/26/24   Leila Daly MD   multivitamin with minerals tablet tablet Take 1 tablet by mouth Daily. 3/6/21   Taiwo Prado APRN   nitroglycerin (NITROSTAT) 0.4 MG SL  "tablet Place 1 tablet under the tongue Every 5 (Five) Minutes As Needed for Chest Pain. Take no more than 3 doses in 15 minutes.  Indications: Acute Angina Pectoris 4/21/24   Leila Daly MD   ondansetron ODT (ZOFRAN-ODT) 4 MG disintegrating tablet Place 1 tablet on the tongue Every 8 (Eight) Hours As Needed for Nausea or Vomiting. 8/16/24   MARKIE Angela DO   oxybutynin XL (Ditropan XL) 10 MG 24 hr tablet Take 1 tablet by mouth Daily. 8/14/24   Carey Gilbert APRN   pantoprazole (Protonix) 40 MG EC tablet Take 1 tablet by mouth Daily for 90 days. 5/31/24 8/29/24  Kris Cade MD   predniSONE (DELTASONE) 5 MG tablet Take 1 tablet by mouth Daily. Indications: Acute Joint Inflammation in Gout 4/21/24   Leila Daly MD   spironolactone (ALDACTONE) 25 MG tablet Take 1 tablet by mouth Daily. 6/6/24   Anamaria White APRN   traMADol (ULTRAM) 50 MG tablet Take 1 tablet by mouth Every 12 (Twelve) Hours As Needed for Moderate Pain. Indications: Pain 5/16/24   Leila Daly MD   valACYclovir (VALTREX) 500 MG tablet Take 1 tablet by mouth Daily. 7/26/24   Leila Daly MD     I have utilized all available immediate resources to obtain, update, or review the patient's current medications (including all prescriptions, over-the-counter products, herbals, cannabis/cannabidiol products, and vitamin/mineral/dietary (nutritional) supplements).    Objective     Vital Signs: /51 (BP Location: Right arm, Patient Position: Lying)   Pulse 74   Temp 98.5 °F (36.9 °C) (Rectal)   Resp 22   Ht 160 cm (63\")   Wt 86.6 kg (191 lb)   LMP  (LMP Unknown)   SpO2 98%   BMI 33.83 kg/m²   Physical Exam  Vitals reviewed.   Constitutional:       Comments: Would not communicate   HENT:      Head: Normocephalic and atraumatic.      Right Ear: External ear normal.      Left Ear: External ear normal.      Nose: Nose normal.   Eyes:      General: No scleral icterus.     " Conjunctiva/sclera: Conjunctivae normal.   Cardiovascular:      Rate and Rhythm: Normal rate and regular rhythm.      Heart sounds: Normal heart sounds.   Pulmonary:      Effort: Pulmonary effort is normal.      Breath sounds: Normal breath sounds.   Abdominal:      Palpations: Abdomen is soft.      Tenderness: There is abdominal tenderness.   Musculoskeletal:         General: No swelling or tenderness.      Cervical back: Normal range of motion and neck supple.   Skin:     General: Skin is warm and dry.      Findings: Bruising present.      Comments: Bilateral forearm bruising   Neurological:      Mental Status: She is alert. She is disoriented.      Cranial Nerves: No cranial nerve deficit.   Psychiatric:      Comments: Flat and moaning           Results Reviewed:  Lab Results (last 24 hours)       Procedure Component Value Units Date/Time    High Sensitivity Troponin T 2Hr [[id number]]  (Abnormal) Collected: 08/16/24 2338    Specimen: Blood from Arm, Left Updated: 08/17/24 0008     HS Troponin T 78 ng/L      Troponin T Delta 8 ng/L     Narrative:      High Sensitive Troponin T Reference Range:  <14.0 ng/L- Negative Female for AMI  <22.0 ng/L- Negative Male for AMI  >=14 - Abnormal Female indicating possible myocardial injury.  >=22 - Abnormal Male indicating possible myocardial injury.   Clinicians would have to utilize clinical acumen, EKG, Troponin, and serial changes to determine if it is an Acute Myocardial Infarction or myocardial injury due to an underlying chronic condition.         CBC & Differential [[id number]]  (Abnormal) Collected: 08/16/24 2123    Specimen: Blood Updated: 08/16/24 2203    Narrative:      The following orders were created for panel order CBC & Differential.  Procedure                               Abnormality         Status                     ---------                               -----------         ------                     CBC Auto Differential[[id number]]        Abnormal             Final result                 Please view results for these tests on the individual orders.    CBC Auto Differential [[id number]]  (Abnormal) Collected: 08/16/24 2123    Specimen: Blood Updated: 08/16/24 2203     WBC 4.95 10*3/mm3      RBC 3.02 10*6/mm3      Hemoglobin 8.6 g/dL      Hematocrit 28.1 %      MCV 93.0 fL      MCH 28.5 pg      MCHC 30.6 g/dL      RDW 21.2 %      RDW-SD 68.9 fl      MPV --     Comment: Unable to Calculate         Platelets 115 10*3/mm3     Manual Differential [[id number]]  (Abnormal) Collected: 08/16/24 2123    Specimen: Blood Updated: 08/16/24 2203     Neutrophil % 63.6 %      Lymphocyte % 12.1 %      Monocyte % 8.1 %      Eosinophil % 1.0 %      Basophil % 0.0 %      Bands %  15.2 %      Neutrophils Absolute 3.90 10*3/mm3      Lymphocytes Absolute 0.60 10*3/mm3      Monocytes Absolute 0.40 10*3/mm3      Eosinophils Absolute 0.05 10*3/mm3      Basophils Absolute 0.00 10*3/mm3      Anisocytosis Mod/2+     Dacrocytes Slight/1+     Macrocytes Slight/1+     Microcytes Slight/1+     Poikilocytes Mod/2+     Polychromasia Mod/2+     WBC Morphology Normal     Platelet Estimate Decreased     Clumped Platelets Present    Comprehensive Metabolic Panel [[id number]]  (Abnormal) Collected: 08/16/24 2123    Specimen: Blood Updated: 08/16/24 2153     Glucose 99 mg/dL      BUN 29 mg/dL      Creatinine 2.11 mg/dL      Sodium 139 mmol/L      Potassium 4.3 mmol/L      Comment: Slight hemolysis detected by analyzer. Result may be falsely elevated.        Chloride 103 mmol/L      CO2 23.0 mmol/L      Calcium 9.4 mg/dL      Total Protein 6.3 g/dL      Albumin 2.8 g/dL      ALT (SGPT) 28 U/L      AST (SGOT) 50 U/L      Alkaline Phosphatase 135 U/L      Total Bilirubin 0.5 mg/dL      Globulin 3.5 gm/dL      A/G Ratio 0.8 g/dL      BUN/Creatinine Ratio 13.7     Anion Gap 13.0 mmol/L      eGFR 24.6 mL/min/1.73     Narrative:      GFR Normal >60  Chronic Kidney Disease <60  Kidney Failure <15    The GFR formula is  only valid for adults with stable renal function between ages 18 and 70.    Single High Sensitivity Troponin T [[id number]]  (Abnormal) Collected: 08/16/24 2123    Specimen: Blood Updated: 08/16/24 2152     HS Troponin T 70 ng/L     Narrative:      High Sensitive Troponin T Reference Range:  <14.0 ng/L- Negative Female for AMI  <22.0 ng/L- Negative Male for AMI  >=14 - Abnormal Female indicating possible myocardial injury.  >=22 - Abnormal Male indicating possible myocardial injury.   Clinicians would have to utilize clinical acumen, EKG, Troponin, and serial changes to determine if it is an Acute Myocardial Infarction or myocardial injury due to an underlying chronic condition.         Lipase [[id number]]  (Normal) Collected: 08/16/24 2123    Specimen: Blood Updated: 08/16/24 2148     Lipase 16 U/L     Pilgrim Draw [[id number]] Collected: 08/16/24 2050    Specimen: Blood Updated: 08/16/24 2145    Narrative:      The following orders were created for panel order Pilgrim Draw.  Procedure                               Abnormality         Status                     ---------                               -----------         ------                     Green Top (Gel)[[id number]]                                  Final result               Lavender Top[[id number]]                                     Final result               Red Top[[id number]]                                          Final result               Light Blue Top[[id number]]                                   Final result                 Please view results for these tests on the individual orders.    Red Top [[id number]] Collected: 08/16/24 2123    Specimen: Blood Updated: 08/16/24 2145     Extra Tube Hold for add-ons.     Comment: Auto resulted.       Urinalysis With Microscopic If Indicated (No Culture) - Urine, Clean Catch [[id number]]  (Abnormal) Collected: 08/16/24 2100    Specimen: Urine, Clean Catch Updated: 08/16/24 2122     Color, UA Dark Yellow      Appearance, UA Turbid     pH, UA 5.5     Specific Gravity, UA 1.016     Glucose, UA Negative     Ketones, UA Trace     Bilirubin, UA Small (1+)     Blood, UA Large (3+)     Protein, UA 30 mg/dL (1+)     Leuk Esterase, UA Large (3+)     Nitrite, UA Positive     Urobilinogen, UA 1.0 E.U./dL    Urinalysis, Microscopic Only - Urine, Clean Catch [[id number]]  (Abnormal) Collected: 08/16/24 2100    Specimen: Urine, Clean Catch Updated: 08/16/24 2122     RBC, UA 11-20 /HPF      WBC, UA 21-50 /HPF      Bacteria, UA 1+ /HPF      Squamous Epithelial Cells, UA 0-2 /HPF      Hyaline Casts, UA None Seen /LPF      Methodology Manual Light Microscopy    Lactic Acid, Plasma [[id number]]  (Normal) Collected: 08/16/24 2050    Specimen: Blood Updated: 08/16/24 2121     Lactate 2.0 mmol/L     Protime-INR [[id number]]  (Abnormal) Collected: 08/16/24 2050    Specimen: Blood Updated: 08/16/24 2117     Protime 17.4 Seconds      INR 1.37    aPTT [[id number]]  (Abnormal) Collected: 08/16/24 2050    Specimen: Blood Updated: 08/16/24 2117     PTT 41.6 seconds     Blood Gas, Arterial With Co-Ox [[id number]]  (Abnormal) Collected: 08/16/24 2115    Specimen: Arterial Blood Updated: 08/16/24 2115     Site Right Radial     Basilio's Test Positive     pH, Arterial 7.387 pH units      pCO2, Arterial 40.1 mm Hg      pO2, Arterial 62.3 mm Hg      Comment: 84 Value below reference range        HCO3, Arterial 24.1 mmol/L      Base Excess, Arterial -0.8 mmol/L      Comment: 84 Value below reference range        O2 Saturation, Arterial 89.5 %      Comment: 84 Value below reference range        Hemoglobin, Blood Gas 9.5 g/dL      Comment: 84 Value below reference range        Hematocrit, Blood Gas 29.2 %      Comment: 84 Value below reference range        Oxyhemoglobin 87.5 %      Comment: 84 Value below reference range        Methemoglobin 1.00 %      Carboxyhemoglobin 1.2 %      Temperature 37.0     Sodium, Arterial 139 mmol/L      Potassium, Arterial 3.7 mmol/L       Ionized Calcium 4.65 mg/dL      Barometric Pressure for Blood Gas 751 mmHg      Modality Room Air     Ventilator Mode NA     Collected by [id number]     Comment: Meter: [id number]     :  anita        pH, Temp Corrected 7.387 pH Units      pCO2, Temperature Corrected 40.1 mm Hg      pO2, Temperature Corrected 62.3 mm Hg     Green Top (Gel) [[id number]] Collected: 08/16/24 2050    Specimen: Blood Updated: 08/16/24 2115     Extra Tube Hold for add-ons.     Comment: Auto resulted.       Lavender Top [[id number]] Collected: 08/16/24 2050    Specimen: Blood Updated: 08/16/24 2115     Extra Tube hold for add-on     Comment: Auto resulted       Light Blue Top [[id number]] Collected: 08/16/24 2050    Specimen: Blood Updated: 08/16/24 2115     Extra Tube Hold for add-ons.     Comment: Auto resulted       Blood Culture - Blood, Hand, Right [[id number]] Collected: 08/16/24 2050    Specimen: Blood from Hand, Right Updated: 08/16/24 2109    Blood Culture - Blood, Arm, Left [[id number]] Collected: 08/16/24 2050    Specimen: Blood from Arm, Left Updated: 08/16/24 2109          Imaging Results (Last 24 Hours)       Procedure Component Value Units Date/Time    CT Abdomen Pelvis Without Contrast [[id number]] Resulted: 08/16/24 2213     Updated: 08/16/24 2229    XR Chest 1 View [[id number]] Collected: 08/16/24 2121     Updated: 08/16/24 2126    Narrative:      EXAMINATION:  XR CHEST 1 VW-  8/16/2024 7:48 PM     HISTORY: Fever. Hypertension.     COMPARISON: 6/12/2024 and 11/29/2014.     TECHNIQUE: Single view AP image.     FINDINGS: There is mild hypoventilation. There is vascular crowding.  There is a calcified granuloma in the right lung apex. There is a  calcified right suprahilar lymph node. There is stable bronchial wall  thickening. The heart is normal in size. There is a pacemaker on the  left. There are degenerative changes of the spine.          Impression:      1. Hypoventilation with vascular crowding.  2. Stable  bronchial wall thickening. Old granulomatous disease.           This report was signed and finalized on 8/16/2024 9:23 PM by Dr. Zurdo Vazquez MD.             I have personally reviewed and interpreted the radiology studies and ECG obtained at time of admission.     Assessment / Plan   Assessment:   Active Hospital Problems    Diagnosis     **UTI (urinary tract infection), bacterial      Impresison:  UTI  Confusion  Elevated troponin  Anemia  PINKY, previously 1.47    Treatment Plan  [facility]  PT/OT/Speech consult  Fall, skin, aspiration precautions  IVF  Cardiology consult  FU labs in the am    The patient will be admitted to my service here at [facility].  Primary team to take over in the morning    Medical Decision Making  Number and Complexity of problems: 4, complex  Differential Diagnosis: Enteritis    Conditions and Status        Condition is unchanged.     [facility] Data  External documents reviewed: None   Cardiac tracing (EKG, telemetry) interpretation: None  Radiology interpretation: None  Labs reviewed: Reviewed  Any tests that were considered but not ordered: None     Decision rules/scores evaluated (example [id number], Wells, etc): Chronically anticoagulated     Discussed with: Caregiver and family     Care Planning  Shared decision making: Family and ED staff  Code status and discussions: Full    Disposition  Social Determinants of Health that impact treatment or disposition: Non-ambulatory  Estimated length of stay is 1-2 days.     I confirmed that the patient's advanced care plan is present, code status is documented, and a surrogate decision maker is listed in the patient's medical record.     The patient's surrogate decision maker is Family.     The patient was seen and examined by me on 8/17/24 at 1.    Electronically signed by Yuli Hatch DO, 08/17/24, 01:42 CDT.

## 2024-08-17 NOTE — PLAN OF CARE
Goal Outcome Evaluation:  Plan of Care Reviewed With: patient        Progress: no change  Outcome Evaluation: Pt admitted from ED this am, pt is alert to person, , but is weak and lethargic, grimaces when moved and c/o it hurting.  but when laid still, pt does relax her face and does not c/o pain. VSS, Pt does follow commands, pt washed and cream applied to excoriated areas. wound vac present to right upper posterior hip. Pt has several consults for today. Caregiver is at bedside. Pt is resting, we have turned her with wedges placed, pt has purwick with 30 ml UO, specimen sent to lab. SR [id number] on tele. safety is maintained, care plan ongoing.

## 2024-08-17 NOTE — PROGRESS NOTES
"Pharmacy Dosing Service  Pharmacokinetics  Vancomycin Initial Evaluation  Assessment/Action/Plan:  Loading dose?: No  Current Order: Vancomycin 500 mg IVPB every 12 hours  Current end date:08/20/24  Levels: No  Additional antimicrobial agent(s): Cefepime  MRSA Nasal PCR ordered: No    Vancomycin dosage initiated based on population pharmacokinetic parameters. Pharmacy will continue to follow daily and adjust dose accordingly.     Subjective:  Tawny Shin is a 69 y.o. female with a Vancomycin \"Pharmacy to Dose\" consult for the treatment of bacteremia , day 1 of 3 of treatment.    AUC Model Data:  Loading dose: N/A  Regimen: 500 mg IV every 12 hours.  Start time: 17:09 on 08/17/2024  Exposure target: AUC24 (range)400-600 mg/L.hr   AUC24,ss: 532 mg/L.hr  PAUC*: 81 %  Ctrough,ss: 19.7 mg/L  Pconc*: 48 %  Tox.: 17 %      Objective:  Ht: 160 cm (62.99\"); Wt: 88.9 kg (195 lb 15.8 oz)  Estimated Creatinine Clearance: 27.4 mL/min (A) (by C-G formula based on SCr of 1.99 mg/dL (H)).   Creatinine   Date Value Ref Range Status   08/17/2024 1.99 (H) 0.57 - 1.00 mg/dL Final   08/16/2024 2.11 (H) 0.57 - 1.00 mg/dL Final   08/16/2024 2.33 (H) 0.57 - 1.00 mg/dL Final   07/17/2024 0.7 0.5 - 0.9 mg/dL Final   07/16/2024 0.6 0.5 - 0.9 mg/dL Final   07/15/2024 0.7 0.5 - 0.9 mg/dL Final      Lab Results   Component Value Date    WBC 3.64 08/17/2024    WBC 4.95 08/16/2024    WBC 4.68 08/02/2024      Baseline culture results:  Microbiology Results (last 10 days)       Procedure Component Value - Date/Time    Blood Culture - Blood, Arm, Left [[id number]]  (Abnormal) Collected: 08/16/24 2050    Lab Status: Preliminary result Specimen: Blood from Arm, Left Updated: 08/17/24 1605     Blood Culture Abnormal Stain     Gram Stain Aerobic Bottle Gram positive cocci            Jer Reece, PharmD  08/17/24 16:12 CDT    "

## 2024-08-17 NOTE — THERAPY EVALUATION
Acute Care - Speech Language Pathology   Swallow Initial Evaluation [facility]     Patient Name: Tawny Shin  : 1955  MRN: [id number]  Today's Date: 2024               Admit Date: 2024  SPEECH-LANGUAGE PATHOLOGY EVALUATION - SWALLOW  Subjective: The patient was seen on this date for a Clinical Swallow evaluation.  Patient was alert and cooperative. Initially SLP consulted for communication evaluation; however, patient improved and RN concerned with swallowing following medication this AM. RN changed order to reflect need of patient.   Significant history: Presented due to confusion and fever. Decreased appetite and generalized weakness. Concern for UTI. Prior history of esophageal dilation.   Objective: Oral motor examination results: WFL.  Textures given during assessment of swallow function included thin liquid, puree consistency, and regular consistency.  Assessment: Difficulties were noted with none of the above consistencies.  Observations: No overt s/s of aspiration observed. Functional rotary chew with solids. Patient and caregiver report that she does have difficulty with her medications at times. She sometimes brings them back up from being stuck and then refuses any further intake. Suspicious for esophageal phase dysphagia. She does have history of esophageal dilations.   SLP Findings:  Patient presents with functional swallow, with esophageal component.   Recommendations: Diet Textures: regular and thin liquids  Medications should be taken whole with puree.   Recommended Strategies: upright for PO, small bites and sips, alternate liquids and solids, and reflux precautions. Oral care 2x a day.  Other Recommended Evaluations: VFSS with any increasing concerns.   Dysphagia therapy is recommended for diet toleration and discussion of compensatory strategies for suspected esophageal phase dysphagia that appears to be chronic in nature.   Nicole Amin MS CCC-SLP 2024 12:18  CDT    Visit Dx:     ICD-10-CM ICD-9-CM   1. Urinary tract infection, bacterial  N39.0 599.0    A49.9 041.9   2. Demand ischemia  I24.89 411.89   3. Esophageal dysphagia  R13.19 787.29     Patient Active Problem List   Diagnosis    T12 compression fracture, initial encounter    Chronic embolism and thrombosis of unspecified deep veins of left lower extremity    Chronic anticoagulation    Iron deficiency anemia    Osteoporosis    E coli bacteremia    Epidural hematoma    Pleural effusion, left    Functional neurological symptom disorder with weakness or paralysis    Class 1 obesity due to excess calories with serious comorbidity and body mass index (BMI) of 33.0 to 33.9 in adult    Rheumatoid arthritis involving multiple sites    Chronic heart failure with preserved ejection fraction    Venous insufficiency    Coronary artery disease involving native coronary artery of native heart without angina pectoris    Sick sinus syndrome    Essential hypertension    Pressure injury of skin of heel    Chronic pain    Anemia of chronic disease    Cholelithiasis    Pressure injury of skin of buttock    Near functional paraplegia    Skin cancer    Squamous cell carcinoma of back    Hematoma    Right-sided chest wall pain    Hyperlipidemia LDL goal <70    Malodorous urine    Stage 3b chronic kidney disease    Cyst of buttocks    Sepsis due to Escherichia coli without acute organ dysfunction    Generalized weakness    Paralysis    History of urinary retention    Bacteremia due to Enterobacter species    Bacteremia    Hypothyroidism    Abnormal CT of the abdomen    Presence of cardiac pacemaker    Esophageal dysphagia    Acute gout    UTI (urinary tract infection), bacterial     Past Medical History:   Diagnosis Date    Age-related osteoporosis with current pathological fracture 05/27/2020    Arthritis     Asthma     Bilateral bunions 12/23/2020    Cancer     Cardiac pacemaker syndrome 12/23/2020    Overview:  - heart block -  "implanted 11/16    Charcot's joint of foot, left 12/23/2020    Chronic deep vein thrombosis (DVT) of right lower extremity 06/23/2021    Chronic pain syndrome 06/22/2021    Chronic sinusitis     COPD (chronic obstructive pulmonary disease)     Coronary artery disease     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    Elevated cholesterol     Eustachian tube dysfunction     Heart disease     Herpes simplex     History of transfusion     Hyperlipidemia     Hypertension     Hypothyroidism 12/23/2020    Intrinsic asthma 12/23/2020    Knee dislocation     Labral tear of right hip joint     Laryngitis sicca     Laryngitis, chronic     Left carotid bruit 03/09/2016    MI (myocardial infarction)     Myalgia due to statin 06/25/2019    Open wound of right hip 09/14/2021    Osteomyelitis of right femur 07/06/2021    Otorrhea     Pacemaker 11/17/2016    Primary osteoarthritis of left knee 12/23/2020    Psoriasis vulgaris 12/23/2020    S/P coronary artery stent placement 03/09/2016    Sensorineural hearing loss     Seropositive rheumatoid arthritis of multiple sites 12/27/2019    Overview:  -myochrysine '93-'96 -methotrexate '96--->11/98;r/s  restarted 2/99--> 8/14 (anemia) -sulfasalazine- not effective -penicillamine 6/98-->10/98; no effect -leflunomide 11/98--> - Humira '13-->didn't take - Enbrel 12/14-->3/15- no effect!   Last Assessment & Plan:  - \"aching all over\" because she had to be off her anti-rheumatic drugs for 2 weeks in preparation for her R knee surgery - he    Sick sinus syndrome 12/27/2019    Sjogren's disease     Spondylolisthesis of lumbar region 01/17/2018    Syncope, recurrent 02/08/2021    Urinary tract infection     UTI (urinary tract infection) 04/19/2024    UTI (urinary tract infection)      Past Surgical History:   Procedure Laterality Date    A-V CARDIAC PACEMAKER INSERTION  2016    ATRIAL CARDIAC PACEMAKER INSERTION      CARDIAC CATHETERIZATION      CATARACT EXTRACTION      CERVICAL CORPECTOMY N/A " 03/03/2021    Procedure: CERVICAL 6 CORPECTOMY WITH TITANIUM CAGE WITH NEURO MONITORING;  Surgeon: Bandar Shea MD;  Location:  [facility] OR;  Service: Neurosurgery;  Laterality: N/A;    CHOLECYSTECTOMY WITH INTRAOPERATIVE CHOLANGIOGRAM N/A 07/03/2024    Procedure: CHOLECYSTECTOMY LAPAROSCOPIC WITH INTRAOPERATIVE CHOLANGIOGRAM;  Surgeon: Moises Keyes MD;  Location:  [facility] OR;  Service: General;  Laterality: N/A;    COLONOSCOPY  11/08/2011    One fold in the ascending colon which showed ulcer otherwise normal exam    COLONOSCOPY  11/12/2004    Normal exam repeat in five years    CORONARY ANGIOPLASTY WITH STENT PLACEMENT      X 2; 2013 & 2014    CYSTOSCOPY BLADDER STONE LITHOTRIPSY      ENDOSCOPY  07/10/2014    Normal exam    ENDOSCOPY N/A 05/30/2024    Procedure: ESOPHAGOGASTRODUODENOSCOPY WITH ANESTHESIA;  Surgeon: Taiwo Sanchez MD;  Location: [facility] ENDOSCOPY;  Service: Gastroenterology;  Laterality: N/A;  preop; dysphagia  postop: balloon dilation  PCP Jesus Manuel jeff    FLAP LEG Right 09/14/2021    Procedure: RIGHT GLUTEAL FASCIOCUTANEOUS ADVANCEMENT FLAP AND RIGHT TENSOR FASCIAL JESSICA FLAP;  Surgeon: Amadeo Turner MD;  Location: [facility] OR;  Service: Plastics;  Laterality: Right;    GALLBLADDER SURGERY      HIP ABDUCTION TENOTOMY BILATERAL Right 01/14/2021    Procedure: RIGHT HIP GLUTEUS MEDLUS / MINIMUS REPAIR, POSSIBLE ACHILLES ALLOGRAFT;  Surgeon: Nino Carlson MD;  Location:  [facility] OR;  Service: Orthopedics;  Laterality: Right;    INCISION AND DRAINAGE ABSCESS Right 06/04/2022    Procedure: INCISION AND DRAINAGE ABSCESS right hip;  Surgeon: Magda Salcido MD;  Location: [facility] OR;  Service: General;  Laterality: Right;    INCISION AND DRAINAGE ABSCESS Right 06/10/2022    Procedure: RIGHT HIP INCISION AND DRAINAGE. MD NEEDS 3L VANC IRRIGATION, CURRETTES, DAICANS, KERLEX ROLLS;  Surgeon: Amadeo Turner MD;  Location:  [facility] OR;  Service: Plastics;  Laterality: Right;    INCISION AND DRAINAGE  HIP Right 02/09/2021    Procedure: HIP INCISION AND DRAINAGE;  Surgeon: Nino Carlson MD;  Location:  [facility] OR;  Service: Orthopedics;  Laterality: Right;    INCISION AND DRAINAGE LEG Right 10/24/2021    Procedure: INCISION AND DRAINAGE LOWER EXTREMITY;  Surgeon: Amadeo Turner MD;  Location:  [facility] OR;  Service: Plastics;  Laterality: Right;    INCISION AND DRAINAGE OF WOUND Right 07/08/2021    Procedure: INCISION AND DRAINAGE WOUND RIGHT HIP;  Surgeon: James Huntley MD;  Location:  [facility] OR;  Service: Orthopedics;  Laterality: Right;    JOINT REPLACEMENT      KYPHOPLASTY WITH BIOPSY Bilateral 10/26/2021    Procedure: THOARCIC 12 KYPHOPLASTY WITH BIOPSY;  Surgeon: Bandar Shea MD;  Location:  [facility] OR;  Service: Neurosurgery;  Laterality: Bilateral;    LEG DEBRIDEMENT Right 09/14/2021    Procedure: DEBRIDEMENT OF RIGHT HIP WOUND, RIGHT GLUTEAL FASCIOCUTANEOUS ADVANCEMENT FLAP AND RIGHT TENSOR FASCIAL JESSICA FLAP;  Surgeon: Amadeo Turner MD;  Location:  [facility] OR;  Service: Plastics;  Laterality: Right;    LUMBAR DISCECTOMY Right 03/23/2021    Procedure: LUMBAR DISCECTOMY MICRO, Lumbar 1/2 right;  Surgeon: Bandar Shea MD;  Location:  [facility] OR;  Service: Neurosurgery;  Laterality: Right;    LUMBAR FUSION N/A 01/19/2018    Procedure: L3-4,L4-5 DECOMPRESSION, POSTERIOR SPINAL FUSION WITH INSTRUMENTATION;  Surgeon: Fortino Oropeza MD;  Location: [facility] OR;  Service:     LUMBAR LAMINECTOMY WITH FUSION Left 01/17/2018    Procedure: LEFT L3-4 L4-5 LATERAL LUMBAR INTERBODY FUSION;  Surgeon: Fortino Oropeza MD;  Location:  [facility] OR;  Service:     MASS EXCISION Right 04/23/2024    Procedure: RIGHT BUTTOCK MASS EXCISION;  Surgeon: Moises Keyes MD;  Location:  [facility] OR;  Service: General;  Laterality: Right;    MYRINGOTOMY W/ TUBES  09/04/2014    TUBES NO LONGER IN PLACE    OTHER SURGICAL HISTORY      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 so hardware was removed and spacers were placed     REPLACEMENT TOTAL KNEE Right     URETEROSCOPY LASER LITHOTRIPSY WITH STENT INSERTION N/A 06/21/2024    Procedure: URETEROSCOPY LASER LITHOTRIPSY WITH STENT INSERTION LEFT;  Surgeon: Ky Plascencia MD;  Location: [facility] OR;  Service: Urology;  Laterality: N/A;       SLP Recommendation and Plan  SLP Swallowing Diagnosis: functional oral phase, R/O pharyngeal dysphagia, suspected esophageal dysphagia (08/17/24 1129)  SLP Diet Recommendation: regular textures, thin liquids (08/17/24 1129)  Recommended Precautions and Strategies: upright posture during/after eating, small bites of food and sips of liquid, alternate between small bites of food and sips of liquid, general aspiration precautions, fatigue precautions, reflux precautions (08/17/24 1129)  SLP Rec. for Method of Medication Administration: meds whole, with puree (08/17/24 1129)     Monitor for Signs of Aspiration: yes, notify SLP if any concerns (08/17/24 1129)  Recommended Diagnostics: VFSS (MBS) (with any increased concerns) (08/17/24 1129)  Swallow Criteria for Skilled Therapeutic Interventions Met: demonstrates skilled criteria (08/17/24 1129)     Rehab Potential/Prognosis, Swallowing: good, to achieve stated therapy goals (08/17/24 1129)  Therapy Frequency (Swallow): PRN (08/17/24 1129)  Predicted Duration Therapy Intervention (Days): 1 week (08/17/24 1129)  Oral Care Recommendations: Oral Care BID/PRN, Toothbrush (08/17/24 1129)     Swallowing Considerations per Physician Discretion: medical management of suspected esophageal dysphagia, as indicated (08/17/24 1129)                                  Plan of Care Reviewed With: patient, caregiver, family  Progress: no change      SWALLOW EVALUATION (Last 72 Hours)       SLP Adult Swallow Evaluation       Row Name 08/17/24 1129                   Rehab Evaluation    Document Type evaluation  -MG        Subjective Information no complaints  -MG        Patient Observations alert;cooperative;agree to therapy   -MG        Patient Effort good  -MG        Symptoms Noted During/After Treatment none  -MG           General Information    Patient Profile Reviewed yes  -MG        Pertinent History Of Current Problem Presented due to confusion and fever. Decreased appetite and generalized weakness. Concern for UTI. Prior history of esophageal dilation.  -MG        Current Method of Nutrition clear liquids  -MG        Precautions/Limitations, Vision WFL;for purposes of eval  -MG        Precautions/Limitations, Hearing WFL;for purposes of eval  -MG        Prior Level of Function-Communication WFL  -MG        Prior Level of Function-Swallowing no diet consistency restrictions;esophageal concerns  -MG        Plans/Goals Discussed with patient;family;agreed upon  -MG        Barriers to Rehab previous functional deficit  -MG        Patient's Goals for Discharge return to PO diet  -MG        Family Goals for Discharge patient able to return to PO diet  -MG           Pain    Additional Documentation Pain Scale: FACES Pre/Post-Treatment (Group)  -MG           Pain Scale: FACES Pre/Post-Treatment    Pain: FACES Scale, Pretreatment 0-->no hurt  -MG        Posttreatment Pain Rating 0-->no hurt  -MG           Oral Motor Structure and Function    Dentition Assessment natural, present and adequate  -MG        Secretion Management WNL/WFL  -MG        Mucosal Quality dry  -MG        Volitional Swallow WFL  -MG        Volitional Cough WFL  -MG           Oral Musculature and Cranial Nerve Assessment    Oral Motor General Assessment WFL  -MG           General Eating/Swallowing Observations    Eating/Swallowing Skills self-fed;fed by SLP  -MG        Positioning During Eating upright in bed  -MG        Utensils Used spoon;straw  -MG        Consistencies Trialed regular textures;pureed;thin liquids  -MG           Clinical Swallow Eval    Oral Prep Phase WFL  -MG        Oral Transit WFL  -MG        Oral Residue WFL  -MG        Pharyngeal Phase no overt  signs/symptoms of pharyngeal impairment  -MG        Esophageal Phase suspected esophageal impairment  -MG        Clinical Swallow Evaluation Summary See note  -MG           Esophageal Phase Concerns    Esophageal Phase Concerns other (see comments)  -MG        Esophageal Phase Concerns, Comment reported by RN and patient caregiver at baseline  -MG           SLP Evaluation Clinical Impression    SLP Swallowing Diagnosis functional oral phase;R/O pharyngeal dysphagia;suspected esophageal dysphagia  -MG        Functional Impact risk of aspiration/pneumonia;risk of malnutrition;risk of dehydration  -MG        Rehab Potential/Prognosis, Swallowing good, to achieve stated therapy goals  -MG        Swallow Criteria for Skilled Therapeutic Interventions Met demonstrates skilled criteria  -MG           Recommendations    Therapy Frequency (Swallow) PRN  -MG        Predicted Duration Therapy Intervention (Days) 1 week  -MG        SLP Diet Recommendation regular textures;thin liquids  -MG        Recommended Diagnostics VFSS (MBS)  with any increased concerns  -MG        Recommended Precautions and Strategies upright posture during/after eating;small bites of food and sips of liquid;alternate between small bites of food and sips of liquid;general aspiration precautions;fatigue precautions;reflux precautions  -MG        Oral Care Recommendations Oral Care BID/PRN;Toothbrush  -MG        SLP Rec. for Method of Medication Administration meds whole;with puree  -MG        Monitor for Signs of Aspiration yes;notify SLP if any concerns  -MG        Swallowing Considerations per Physician Discretion medical management of suspected esophageal dysphagia, as indicated  -MG           Swallow Goals (SLP)    Swallow LTGs Swallow Long Term Goal (free text)  -MG        Swallow STGs diet tolerance goal selection (SLP);swallow compensatory strategies goal selection (SLP)  -MG        Diet Tolerance Goal Selection (SLP) Patient will tolerate trials  of  -MG        Swallow Compensatory Strategies Goal Selection (SLP) swallow compensatory strategies, SLP goal 1  -MG           (LTG) Swallow    (LTG) Swallow Patient will tolerate least restrictive diet without overt s/s of aspiration.  -MG        Ray (Swallow Long Term Goal) independently (over 90% accuracy)  -MG        Time Frame (Swallow Long Term Goal) by discharge  -MG        Barriers (Swallow Long Term Goal) none  -MG        Progress/Outcomes (Swallow Long Term Goal) new goal  -MG           (STG) Patient will tolerate trials of    Consistencies Trialed (Tolerate trials) regular textures;thin liquids  -MG        Desired Outcome (Tolerate trials) without signs/symptoms of aspiration;without signs of distress;with adequate oral prep/transit/clearance  -MG        Ray (Tolerate trials) independently (over 90% accuracy)  -MG        Time Frame (Tolerate trials) by discharge  -MG        Progress/Outcomes (Tolerate trials) new goal  -MG           (STG) Swallow Compensatory Strategies Goal 1 (SLP)    Activity (Swallow Compensatory Strategies/Techniques Goal 1, SLP) reflux precautions;compensatory strategies;postural techniques;during meal intake  -MG        Ray/Accuracy (Swallow Compensatory Strategies/Techniques Goal 1, SLP) independently (over 90% accuracy)  -MG        Time Frame (Swallow Compensatory Strategies/Techniques Goal 1, SLP) by discharge  -MG        Barriers (Swallow Compensatory Strategies/Techniques Goal 1, SLP) none  -MG        Progress/Outcomes (Swallow Compensatory Strategies/Techniques Goal 1, SLP) new goal  -MG                  User Key  (r) = Recorded By, (t) = Taken By, (c) = Cosigned By      Initials Name Effective Dates    MG Nicole Amin, MS [facility]-SLP 07/11/23 -                     EDUCATION  The patient has been educated in the following areas:   Dysphagia (Swallowing Impairment) Oral Care/Hydration.        SLP GOALS       Row Name 08/17/24 1129             (LTG)  Swallow    (LTG) Swallow Patient will tolerate least restrictive diet without overt s/s of aspiration.  -MG      Andrew (Swallow Long Term Goal) independently (over 90% accuracy)  -MG      Time Frame (Swallow Long Term Goal) by discharge  -MG      Barriers (Swallow Long Term Goal) none  -MG      Progress/Outcomes (Swallow Long Term Goal) new goal  -MG         (STG) Patient will tolerate trials of    Consistencies Trialed (Tolerate trials) regular textures;thin liquids  -MG      Desired Outcome (Tolerate trials) without signs/symptoms of aspiration;without signs of distress;with adequate oral prep/transit/clearance  -MG      Andrew (Tolerate trials) independently (over 90% accuracy)  -MG      Time Frame (Tolerate trials) by discharge  -MG      Progress/Outcomes (Tolerate trials) new goal  -MG         (STG) Swallow Compensatory Strategies Goal 1 (SLP)    Activity (Swallow Compensatory Strategies/Techniques Goal 1, SLP) reflux precautions;compensatory strategies;postural techniques;during meal intake  -MG      Andrew/Accuracy (Swallow Compensatory Strategies/Techniques Goal 1, SLP) independently (over 90% accuracy)  -MG      Time Frame (Swallow Compensatory Strategies/Techniques Goal 1, SLP) by discharge  -MG      Barriers (Swallow Compensatory Strategies/Techniques Goal 1, SLP) none  -MG      Progress/Outcomes (Swallow Compensatory Strategies/Techniques Goal 1, SLP) new goal  -MG                User Key  (r) = Recorded By, (t) = Taken By, (c) = Cosigned By      Initials Name Provider Type    MG Nicole Amin MS CCC-SLP Speech and Language Pathologist                         Time Calculation:    Time Calculation- SLP       Row Name 08/17/24 1217             Time Calculation- SLP    SLP Start Time 1129  -MG      SLP Stop Time 1223  chart review time this AM and attempt x2 to see  -MG      SLP Time Calculation (min) 54 min  -MG      SLP Received On 08/17/24  -MG      SLP Goal Re-Cert Due Date  08/27/24  -MG         Untimed Charges    SLP Eval/Re-eval  ST Eval Oral Pharyng Swallow - [id number]  -MG      [id number] Eval Oral Pharyng Swallow Minutes 54  -MG         Total Minutes    Untimed Charges Total Minutes 54  -MG       Total Minutes 54  -MG                User Key  (r) = Recorded By, (t) = Taken By, (c) = Cosigned By      Initials Name Provider Type     Nicole Amin, MS CCC-SLP Speech and Language Pathologist                    Therapy Charges for Today       Code Description Service Date Service Provider Modifiers Qty    [id number]  ST EVAL ORAL PHARYNG SWALLOW 4 8/17/2024 Nicole Amin, MS CCC-SLP GN 1                 Nicole Amin MS CCC-SLP  8/17/2024

## 2024-08-17 NOTE — PROGRESS NOTES
Patient was admitted earlier this morning by Dr. Hatch and her history and physical have been reviewed.    Patient has been experiencing fever, decreased appetite, and worsening generalized weakness particularly over the past couple of days.  She does have a history of recurrent urinary tract infections.  She is chronically immobile.    She was noted to have a Tmax of 101.7 on arrival.  Fever curve has improved this morning.    Reviewing lab studies from July 2024 she appears to have a baseline creatinine of around 0.7.  On 8/16/2024 lab studies revealed a creatinine of 2.33 consistent with acute kidney injury.  Will change IV fluids from half-normal saline over to lactated Ringer's and will increase rate.  I plan to also hold her losartan and spironolactone in the setting.    Blood cultures and urine cultures are pending.  Reviewing a recent urine culture from 6/16/2024 reveals that she does have a history of multidrug-resistant organisms including the following:        In this setting would prefer to broaden coverage from ceftriaxone over to renally dosed cefepime, and I will make that change this morning.  Follow-up final results of both urine and blood cultures closely.  Patient did have some suprapubic tenderness to palpation on examination this morning.  She denies any recent dysuria, however.    Previous hospital records were reviewed including the following:        Admission EKG reviewed and I do not see any acute ST-T changes.  Troponin trend noted.  Patient recently had an echocardiogram in June 2024 with the following results:  Results for orders placed during the hospital encounter of 06/12/24    Adult Transthoracic Echo Complete w/ Color, Spectral and Contrast if necessary per protocol    Interpretation Summary    Left ventricular systolic function is normal. Left ventricular ejection fraction appears to be 61 - 65%.    Left ventricular wall thickness is consistent with mild concentric hypertrophy.     The left atrial cavity is mildly dilated.    Normal size and function of the right ventricle.    No significant valvular pathology.    Compared to prior exam from 4/24/2022, no significant change.    She is on chronic anticoagulation with Eliquis.  She did receive aspirin in the emergency department on arrival.  Plan to continue her beta-blocker and Imdur.  Telemetry monitoring.    Wound care consultation as well.  Patient has a chronic wound with wound VAC to the gluteal region and it sounds like that it was changed yesterday.  Per report from caregiver at bedside they were told the wound appeared to be healing appropriately with no signs of infection.  They were told that the next wound VAC change will occur on Monday.    Workup ongoing.    Electronically signed by Butch Chong MD, 08/17/24, 08:57 CDT.

## 2024-08-17 NOTE — PLAN OF CARE
Goal Outcome Evaluation:  Plan of Care Reviewed With: patient, sibling        Progress: no change  Outcome Evaluation: no c/o pain. IV abx as ordered. Purewick in place. Turn q2

## 2024-08-17 NOTE — PLAN OF CARE
Goal Outcome Evaluation:  Plan of Care Reviewed With: patient, caregiver, family        Progress: no change                    SLP Swallowing Diagnosis: functional oral phase, R/O pharyngeal dysphagia, suspected esophageal dysphagia (08/17/24 1129)             SPEECH-LANGUAGE PATHOLOGY EVALUATION - SWALLOW  Subjective: The patient was seen on this date for a Clinical Swallow evaluation.  Patient was alert and cooperative. Initially SLP consulted for communication evaluation; however, patient improved and RN concerned with swallowing following medication this AM. RN changed order to reflect need of patient.   Significant history: Presented due to confusion and fever. Decreased appetite and generalized weakness. Concern for UTI. Prior history of esophageal dilation.   Objective: Oral motor examination results: WFL.  Textures given during assessment of swallow function included thin liquid, puree consistency, and regular consistency.  Assessment: Difficulties were noted with none of the above consistencies.  Observations: No overt s/s of aspiration observed. Functional rotary chew with solids. Patient and caregiver report that she does have difficulty with her medications at times. She sometimes brings them back up from being stuck and then refuses any further intake. Suspicious for esophageal phase dysphagia. She does have history of esophageal dilations.   SLP Findings:  Patient presents with functional swallow, with esophageal component.   Recommendations: Diet Textures: regular and thin liquids  Medications should be taken whole with puree.   Recommended Strategies: upright for PO, small bites and sips, alternate liquids and solids, and reflux precautions . Oral care 2x a day.  Other Recommended Evaluations: VFSS with any increasing concerns.   Dysphagia therapy is recommended for diet toleration and discussion of compensatory strategies for suspected esophageal phase dysphagia that appears to be chronic in nature.      Nicole Amin, MS CCC-SLP 8/17/2024 12:16 CDT

## 2024-08-17 NOTE — ED PROVIDER NOTES
"EMERGENCY DEPARTMENT ATTENDING NOTE    Patient Name: Tawny Shin    Chief Complaint   Patient presents with    Fever       PATIENT PRESENTATION:  Tawny Shin is a 69 y.o. female with PMH significant for wheelchair-bound, chronic wound with a wound VAC on her right gluteal region, pacemaker, COPD, recurrent UTIs, coronary artery disease who presents to the ED with 3 days of fever, fatigue, decreased appetite.  Was hospitalized approximately 2 months ago for a month at [facility] for UTI, kidney stone, gallbladder issues.  Patient has been home for 2 weeks.  Is with a caregiver 24 hours a day that are family friends.  Patient has not had any vomiting or diarrhea.  Patient unable to provide further history.      PHYSICAL EXAM:   VS: /56 (BP Location: Left arm, Patient Position: Sitting)   Pulse 82   Temp (!) 101.7 °F (38.7 °C) (Oral)   Resp 24   Ht 160 cm (63\")   Wt 86.6 kg (191 lb)   LMP  (LMP Unknown)   SpO2 96%   BMI 33.83 kg/m²   GENERAL: Elderly, pale, alert, no acute distress  EYES: PERRL, sclerae anicteric, extraocular movements grossly intact, symmetric lids  EARS, NOSE, MOUTH, THROAT: atraumatic external nose and ears, moist mucous membranes  NECK: symmetric, trachea midline  RESPIRATORY: unlabored respiratory effort, clear to auscultation bilaterally, good air movement  CARDIOVASCULAR: no murmurs, peripheral pulses 2+ and equal in all extremities  GI: Distended with left lower quadrant tenderness, no guarding, mild rebound in the left lower quadrant  MUSCULOSKELETAL/EXTREMITIES: extremities without obvious deformity  SKIN: warm and dry with no obvious rashes, wound VAC on her right posterior backside without any significant skin changes redness, discharge  NEUROLOGIC: moving all 4 extremities symmetrically, CN II-XII grossly intact, baseline lower extremity contractures  PSYCHIATRIC: alert, pleasant and cooperative. Appropriate mood and affect.      MEDICAL DECISION MAKING:    Tawny Otoole" Kip is a 69 y.o. female who presented to the ED with fever and weakness x 3 days    Procedures    Differential Diagnosis Considered: Surgical abdominal pathology, sepsis, UTI, pyelonephritis, retained kidney stone, post gallbladder infection, meningitis    Labs Ordered:  Labs Reviewed   COMPREHENSIVE METABOLIC PANEL - Abnormal; Notable for the following components:       Result Value    BUN 29 (*)     Creatinine 2.11 (*)     Albumin 2.8 (*)     AST (SGOT) 50 (*)     Alkaline Phosphatase 135 (*)     eGFR 24.6 (*)     All other components within normal limits    Narrative:     GFR Normal >60  Chronic Kidney Disease <60  Kidney Failure <15    The GFR formula is only valid for adults with stable renal function between ages 18 and 70.   PROTIME-INR - Abnormal; Notable for the following components:    Protime 17.4 (*)     INR 1.37 (*)     All other components within normal limits   APTT - Abnormal; Notable for the following components:    PTT 41.6 (*)     All other components within normal limits   URINALYSIS W/ MICROSCOPIC IF INDICATED (NO CULTURE) - Abnormal; Notable for the following components:    Color, UA Dark Yellow (*)     Appearance, UA Turbid (*)     Ketones, UA Trace (*)     Bilirubin, UA Small (1+) (*)     Blood, UA Large (3+) (*)     Protein, UA 30 mg/dL (1+) (*)     Leuk Esterase, UA Large (3+) (*)     Nitrite, UA Positive (*)     All other components within normal limits   SINGLE HS TROPONIN T - Abnormal; Notable for the following components:    HS Troponin T 70 (*)     All other components within normal limits    Narrative:     High Sensitive Troponin T Reference Range:  <14.0 ng/L- Negative Female for AMI  <22.0 ng/L- Negative Male for AMI  >=14 - Abnormal Female indicating possible myocardial injury.  >=22 - Abnormal Male indicating possible myocardial injury.   Clinicians would have to utilize clinical acumen, EKG, Troponin, and serial changes to determine if it is an Acute Myocardial Infarction or  myocardial injury due to an underlying chronic condition.        CBC WITH AUTO DIFFERENTIAL - Abnormal; Notable for the following components:    RBC 3.02 (*)     Hemoglobin 8.6 (*)     Hematocrit 28.1 (*)     MCHC 30.6 (*)     RDW 21.2 (*)     RDW-SD 68.9 (*)     Platelets 115 (*)     All other components within normal limits   URINALYSIS, MICROSCOPIC ONLY - Abnormal; Notable for the following components:    RBC, UA 11-20 (*)     WBC, UA 21-50 (*)     Bacteria, UA 1+ (*)     All other components within normal limits   BLOOD GAS, ARTERIAL W/CO-OXIMETRY - Abnormal; Notable for the following components:    pO2, Arterial 62.3 (*)     Base Excess, Arterial -0.8 (*)     O2 Saturation, Arterial 89.5 (*)     Hemoglobin, Blood Gas 9.5 (*)     Hematocrit, Blood Gas 29.2 (*)     Oxyhemoglobin 87.5 (*)     pO2, Temperature Corrected 62.3 (*)     All other components within normal limits   MANUAL DIFFERENTIAL - Abnormal; Notable for the following components:    Lymphocyte % 12.1 (*)     Bands %  15.2 (*)     Lymphocytes Absolute 0.60 (*)     All other components within normal limits   HIGH SENSITIVITIY TROPONIN T 2HR - Abnormal; Notable for the following components:    HS Troponin T 78 (*)     Troponin T Delta 8 (*)     All other components within normal limits    Narrative:     High Sensitive Troponin T Reference Range:  <14.0 ng/L- Negative Female for AMI  <22.0 ng/L- Negative Male for AMI  >=14 - Abnormal Female indicating possible myocardial injury.  >=22 - Abnormal Male indicating possible myocardial injury.   Clinicians would have to utilize clinical acumen, EKG, Troponin, and serial changes to determine if it is an Acute Myocardial Infarction or myocardial injury due to an underlying chronic condition.        LIPASE - Normal   LACTIC ACID, PLASMA - Normal   BLOOD CULTURE   BLOOD CULTURE   RAINBOW DRAW    Narrative:     The following orders were created for panel order [place] Draw.  Procedure                                Abnormality         Status                     ---------                               -----------         ------                     Green Top (Gel)[[id number]]                                  Final result               Lavender Top[[id number]]                                     Final result               Red Top[[id number]]                                          Final result               Light Blue Top[[id number]]                                   Final result                 Please view results for these tests on the individual orders.   BLOOD GAS, ARTERIAL   GREEN TOP   LAVENDER TOP   RED TOP   LIGHT BLUE TOP   CBC AND DIFFERENTIAL    Narrative:     The following orders were created for panel order CBC & Differential.  Procedure                               Abnormality         Status                     ---------                               -----------         ------                     CBC Auto Differential[[id number]]        Abnormal            Final result                 Please view results for these tests on the individual orders.        Imaging Ordered:   XR Chest 1 View   Final Result   1. Hypoventilation with vascular crowding.   2. Stable bronchial wall thickening. Old granulomatous disease.               This report was signed and finalized on 8/16/2024 9:23 PM by Dr. Zurdo Vazquez MD.          CT Abdomen Pelvis Without Contrast    (Results Pending)       Internal chart review:   Past Medical History:   Diagnosis Date    Age-related osteoporosis with current pathological fracture 05/27/2020    Arthritis     Asthma     Bilateral bunions 12/23/2020    Cancer     Cardiac pacemaker syndrome 12/23/2020    Overview:  - heart block - implanted 11/16    Charcot's joint of foot, left 12/23/2020    Chronic deep vein thrombosis (DVT) of right lower extremity 06/23/2021    Chronic pain syndrome 06/22/2021    Chronic sinusitis     COPD (chronic obstructive pulmonary disease)     Coronary artery disease      "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    Elevated cholesterol     Eustachian tube dysfunction     Heart disease     Herpes simplex     History of transfusion     Hyperlipidemia     Hypertension     Hypothyroidism 12/23/2020    Intrinsic asthma 12/23/2020    Knee dislocation     Labral tear of right hip joint     Laryngitis sicca     Laryngitis, chronic     Left carotid bruit 03/09/2016    MI (myocardial infarction)     Myalgia due to statin 06/25/2019    Open wound of right hip 09/14/2021    Osteomyelitis of right femur 07/06/2021    Otorrhea     Pacemaker 11/17/2016    Primary osteoarthritis of left knee 12/23/2020    Psoriasis vulgaris 12/23/2020    S/P coronary artery stent placement 03/09/2016    Sensorineural hearing loss     Seropositive rheumatoid arthritis of multiple sites 12/27/2019    Overview:  -myochrysine '93-'96 -methotrexate '96--->11/98;r/s  restarted 2/99--> 8/14 (anemia) -sulfasalazine- not effective -penicillamine 6/98-->10/98; no effect -leflunomide 11/98--> - Humira '13-->didn't take - Enbrel 12/14-->3/15- no effect!   Last Assessment & Plan:  - \"aching all over\" because she had to be off her anti-rheumatic drugs for 2 weeks in preparation for her R knee surgery - he    Sick sinus syndrome 12/27/2019    Sjogren's disease     Spondylolisthesis of lumbar region 01/17/2018    Syncope, recurrent 02/08/2021    Urinary tract infection     UTI (urinary tract infection) 04/19/2024       Past Surgical History:   Procedure Laterality Date    A-V CARDIAC PACEMAKER INSERTION  2016    ATRIAL CARDIAC PACEMAKER INSERTION      CARDIAC CATHETERIZATION      CATARACT EXTRACTION      CERVICAL CORPECTOMY N/A 3/3/2021    Procedure: CERVICAL 6 CORPECTOMY WITH TITANIUM CAGE WITH NEURO MONITORING;  Surgeon: Bandar Shea MD;  Location: [facility];  Service: Neurosurgery;  Laterality: N/A;    CHOLECYSTECTOMY WITH INTRAOPERATIVE CHOLANGIOGRAM N/A 7/3/2024    Procedure: CHOLECYSTECTOMY LAPAROSCOPIC WITH " INTRAOPERATIVE CHOLANGIOGRAM;  Surgeon: Moises Keyes MD;  Location:  [facility] OR;  Service: General;  Laterality: N/A;    COLONOSCOPY  11/08/2011    One fold in the ascending colon which showed ulcer otherwise normal exam    COLONOSCOPY  11/12/2004    Normal exam repeat in five years    CORONARY ANGIOPLASTY WITH STENT PLACEMENT      X 2; 2013 & 2014    ENDOSCOPY  07/10/2014    Normal exam    ENDOSCOPY N/A 5/30/2024    Procedure: ESOPHAGOGASTRODUODENOSCOPY WITH ANESTHESIA;  Surgeon: Taiwo Sanchez MD;  Location: [facility] ENDOSCOPY;  Service: Gastroenterology;  Laterality: N/A;  preop; dysphagia  postop: balloon dilation  PCP Jesus Manuel jeff    FLAP LEG Right 9/14/2021    Procedure: RIGHT GLUTEAL FASCIOCUTANEOUS ADVANCEMENT FLAP AND RIGHT TENSOR FASCIAL JESSICA FLAP;  Surgeon: Amadeo Turner MD;  Location: [facility] OR;  Service: Plastics;  Laterality: Right;    HIP ABDUCTION TENOTOMY BILATERAL Right 1/14/2021    Procedure: RIGHT HIP GLUTEUS MEDLUS / MINIMUS REPAIR, POSSIBLE ACHILLES ALLOGRAFT;  Surgeon: Nino Carlson MD;  Location:  [facility] OR;  Service: Orthopedics;  Laterality: Right;    INCISION AND DRAINAGE ABSCESS Right 6/4/2022    Procedure: INCISION AND DRAINAGE ABSCESS right hip;  Surgeon: Magda Salcido MD;  Location:  [facility] OR;  Service: General;  Laterality: Right;    INCISION AND DRAINAGE ABSCESS Right 6/10/2022    Procedure: RIGHT HIP INCISION AND DRAINAGE. MD NEEDS 3L VANC IRRIGATION, CURRETTES, DAICANS, KERLEX ROLLS;  Surgeon: Amadeo Turner MD;  Location:  [facility] OR;  Service: Plastics;  Laterality: Right;    INCISION AND DRAINAGE HIP Right 2/9/2021    Procedure: HIP INCISION AND DRAINAGE;  Surgeon: Nino Carlson MD;  Location: [facility] OR;  Service: Orthopedics;  Laterality: Right;    INCISION AND DRAINAGE LEG Right 10/24/2021    Procedure: INCISION AND DRAINAGE LOWER EXTREMITY;  Surgeon: Amadeo Turner MD;  Location:  [facility] OR;  Service: Plastics;  Laterality: Right;    INCISION AND DRAINAGE OF WOUND  Right 7/8/2021    Procedure: INCISION AND DRAINAGE WOUND RIGHT HIP;  Surgeon: James Huntlye MD;  Location:  [facility] OR;  Service: Orthopedics;  Laterality: Right;    JOINT REPLACEMENT      KYPHOPLASTY WITH BIOPSY Bilateral 10/26/2021    Procedure: THOARCIC 12 KYPHOPLASTY WITH BIOPSY;  Surgeon: Bandar Shea MD;  Location:  [facility] OR;  Service: Neurosurgery;  Laterality: Bilateral;    LEG DEBRIDEMENT Right 9/14/2021    Procedure: DEBRIDEMENT OF RIGHT HIP WOUND, RIGHT GLUTEAL FASCIOCUTANEOUS ADVANCEMENT FLAP AND RIGHT TENSOR FASCIAL JESSICA FLAP;  Surgeon: Amadeo Turner MD;  Location:  [facility] OR;  Service: Plastics;  Laterality: Right;    LUMBAR DISCECTOMY Right 3/23/2021    Procedure: LUMBAR DISCECTOMY MICRO, Lumbar 1/2 right;  Surgeon: Bandar Shea MD;  Location:  [facility] OR;  Service: Neurosurgery;  Laterality: Right;    LUMBAR FUSION N/A 1/19/2018    Procedure: L3-4,L4-5 DECOMPRESSION, POSTERIOR SPINAL FUSION WITH INSTRUMENTATION;  Surgeon: Fortino Oropeza MD;  Location:  [facility] OR;  Service:     LUMBAR LAMINECTOMY WITH FUSION Left 1/17/2018    Procedure: LEFT L3-4 L4-5 LATERAL LUMBAR INTERBODY FUSION;  Surgeon: Fortino Oropeza MD;  Location:  [facility] OR;  Service:     MASS EXCISION Right 4/23/2024    Procedure: RIGHT BUTTOCK MASS EXCISION;  Surgeon: Moises Keyes MD;  Location:  [facility] OR;  Service: General;  Laterality: Right;    MYRINGOTOMY W/ TUBES  09/04/2014    TUBES NO LONGER IN PLACE    OTHER SURGICAL HISTORY      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 so hardware was removed and spacers were placed    REPLACEMENT TOTAL KNEE Right     URETEROSCOPY LASER LITHOTRIPSY WITH STENT INSERTION N/A 6/21/2024    Procedure: URETEROSCOPY LASER LITHOTRIPSY WITH STENT INSERTION LEFT;  Surgeon: Ky Plascencia MD;  Location:  [facility] OR;  Service: Urology;  Laterality: N/A;       Allergies   Allergen Reactions    Atorvastatin Other (See Comments)     LEG CRAMPS      Amoxicillin Rash    Escitalopram  Rash    Nabumetone Rash    Niacin Er Rash    Penicillin G Rash    Penicillins Rash    Simvastatin Rash         Current Facility-Administered Medications:     aspirin chewable tablet 324 mg, 324 mg, Oral, Once, Gee Stahl MD    sodium chloride 0.9 % flush 10 mL, 10 mL, Intravenous, PRN, Gee Stahl MD    Current Outpatient Medications:     acetaminophen (TYLENOL) 325 MG tablet, Take 2 tablets by mouth Every 6 (Six) Hours As Needed for Mild Pain (mild pain). Indications: Fever, Pain, Disp: , Rfl:     allopurinol (ZYLOPRIM) 100 MG tablet, Take 1 tablet by mouth Daily., Disp: 30 tablet, Rfl: 0    apixaban (Eliquis) 5 MG tablet tablet, Take 1 tablet by mouth 2 (Two) Times a Day., Disp: , Rfl:     Ascorbic Acid (Vitamin C) 500 MG capsule, Take 1 tablet by mouth Daily. Indications: Inadequate Vitamin C, Disp: , Rfl:     aspirin 81 MG EC tablet, Take 1 tablet by mouth Daily. Indications: Buildup of Plaques in Large Arteries Leading to the Brain, Disp: , Rfl:     carvedilol (COREG) 25 MG tablet, Take 1 tablet by mouth 2 (Two) Times a Day With Meals. Indications: High Blood Pressure Disorder, Disp: , Rfl:     colchicine 0.6 MG tablet, Take 1 tablet by mouth Daily., Disp: , Rfl:     Diclofenac Sodium (VOLTAREN) 1 % gel gel, Apply 4 g topically to the appropriate area as directed 4 (Four) Times a Day As Needed (Mild pain). Indications: Joint Damage causing Pain and Loss of Function, Disp: , Rfl:     DULoxetine (CYMBALTA) 60 MG capsule, Take 1 capsule by mouth Daily. Indications: Musculoskeletal Pain, Disp: , Rfl:     estradiol (ESTRACE) 0.1 MG/GM vaginal cream, Insert 2 g into the vagina Daily., Disp: 42.5 g, Rfl: 0    Evolocumab (REPATHA) solution prefilled syringe injection, Inject 1 mL under the skin into the appropriate area as directed Every 14 (Fourteen) Days., Disp: 2 mL, Rfl: 11    ferrous sulfate 324 (65 Fe) MG tablet delayed-release EC tablet, Take 1 tablet by mouth Daily With Breakfast. Indications: Iron  Deficiency, Disp: , Rfl:     folic acid (FOLVITE) 1 MG tablet, Take 1 tablet by mouth Daily. Indications: Anemia From Inadequate Folic Acid, Disp: , Rfl:     furosemide (LASIX) 40 MG tablet, Take 1 tablet by mouth As Needed (shortness of breath). Indications: Cardiac Failure, Edema, Disp: , Rfl:     isosorbide mononitrate (IMDUR) 60 MG 24 hr tablet, Take 1 tablet by mouth Daily. Indications: Stable Angina Pectoris, Disp: , Rfl:     leflunomide (ARAVA) 20 MG tablet, Take 1 tablet by mouth every night at bedtime. Indications: Rheumatoid Arthritis, Disp: 90 tablet, Rfl: 3    levothyroxine (SYNTHROID, LEVOTHROID) 75 MCG tablet, Take 1 tablet by mouth Daily. Indications: Underactive Thyroid, Disp: , Rfl:     lidocaine (Lidoderm) 5 %, Place 1 patch on the skin as directed by provider Daily As Needed for Mild Pain. Remove & Discard patch within 12 hours or as directed by MD, Disp: 30 each, Rfl: 2    losartan (COZAAR) 50 MG tablet, Take 1 tablet by mouth Daily. Indications: High Blood Pressure Disorder, Disp: , Rfl:     methenamine (HIPREX) 1 g tablet, Take 1 tablet by mouth 2 (Two) Times a Day With Meals. Indications: Urinary Tract Infection, Disp: , Rfl:     miconazole (MICOTIN) 2 % powder, Apply 1 Application topically to the appropriate area as directed As Needed., Disp: , Rfl:     multivitamin with minerals tablet tablet, Take 1 tablet by mouth Daily., Disp:  , Rfl:     nitroglycerin (NITROSTAT) 0.4 MG SL tablet, Place 1 tablet under the tongue Every 5 (Five) Minutes As Needed for Chest Pain. Take no more than 3 doses in 15 minutes.  Indications: Acute Angina Pectoris, Disp: , Rfl:     ondansetron ODT (ZOFRAN-ODT) 4 MG disintegrating tablet, Place 1 tablet on the tongue Every 8 (Eight) Hours As Needed for Nausea or Vomiting., Disp: 30 tablet, Rfl: 1    oxybutynin XL (Ditropan XL) 10 MG 24 hr tablet, Take 1 tablet by mouth Daily., Disp: 30 tablet, Rfl: 11    pantoprazole (Protonix) 40 MG EC tablet, Take 1 tablet by  mouth Daily for 90 days., Disp: 90 tablet, Rfl: 0    predniSONE (DELTASONE) 5 MG tablet, Take 1 tablet by mouth Daily. Indications: Acute Joint Inflammation in Gout, Disp: , Rfl:     spironolactone (ALDACTONE) 25 MG tablet, Take 1 tablet by mouth Daily., Disp: 30 tablet, Rfl: 11    traMADol (ULTRAM) 50 MG tablet, Take 1 tablet by mouth Every 12 (Twelve) Hours As Needed for Moderate Pain. Indications: Pain, Disp: , Rfl:     valACYclovir (VALTREX) 500 MG tablet, Take 1 tablet by mouth Daily., Disp: , Rfl:     External documents reviewed: Reviewed home health visit from today with wound on her buttocks.  Order for a BMP was placed today by DANIELA Colmenares    My EKG interpretation: EKG normal sinus rhythm with a rate of 90, normal intervals no signs of ischemia or arrhythmia.    My lab interpretation: Patient is not septic, urine with signs of infection and mild PINKY with CKD with elevated troponin and delta of 8    My imaging interpretation: Chest x-ray without acute process.  CT abdomen without surgical abdominal process    Discussed with: Caregiver and daughter    ED Course and Re-evaluation: Patient remained comfortable in the emergency department with no hypotension or persistent tachycardia.  Patient was never septic.  CT without surgical abdominal process or kidney stone.  Will treat UTI which is likely contributing to her weakness systemic symptoms, and acute on chronic renal failure.  Suspect demand ischemia from being hypovolemic.  EKG was nonischemic and patient has never endorsed chest pain.  Patient was given aspirin.    ED Course as of 08/17/24 0113   Fri Aug 16, 2024   2129 Patient is not acidotic, urine is grossly infected, nitrate positive, will treat with Rocephin and pending CT of the abdomen with IV contrast or without based on renal function. [JJ]   2157 Elevated high-sensitivity troponin of 70, suspect demand ischemia [JJ]   2202 Decreased GFR, Noncon CT of the abdomen ordered for further evaluation  given history of stones and urine consistent with infection and possible lower abdominal symptoms with a fever. [JJ]   Sat Aug 17, 2024   0109 Patient with delta Trope 8 and suspect secondary to demand in her febrile illness from her UTI. [JJ]   0109 CT abdomen without acute findings. [JJ]   0109 Plan to admit for patient's PINKY, demand ischemia, and UTI [JJ]   0110 Discussed with Dr. Romo and patient to be admitted. [JJ]      ED Course User Index  [JJ] Gee Stahl MD        ED Diagnosis:  (N39.0,  A49.9) Urinary tract infection, bacterial    (I24.89) Demand ischemia     Disposition: Admitted for IV antibiotics and trending troponins        Signed:  Gee Stahl MD  Emergency Medicine Physician    Please note that portions of this note were completed with a voice recognition program.      Gee Stahl MD  08/17/24 0116

## 2024-08-17 NOTE — ED NOTES
"Nursing report ED to floor  Tawny Shin  69 y.o.  female    HPI:   Chief Complaint   Patient presents with    Fever       Admitting doctor:   Yuli Hatch DO    Consulting provider(s):  Consults       No orders found for last 30 day(s).             Admitting diagnosis:   The primary encounter diagnosis was Urinary tract infection, bacterial. A diagnosis of Demand ischemia was also pertinent to this visit.    Code status:   Current Code Status       Date Active Code Status Order ID Comments User Context       Prior            Allergies:   Atorvastatin, Amoxicillin, Escitalopram, Nabumetone, Niacin er, Penicillin g, Penicillins, and Simvastatin    Intake and Output    Intake/Output Summary (Last 24 hours) at 8/17/2024 0131  Last data filed at 8/17/2024 0052  Gross per 24 hour   Intake 2100 ml   Output --   Net 2100 ml       Weight:       08/16/24 2032   Weight: 86.6 kg (191 lb)       Most recent vitals:   Vitals:    08/16/24 2032 08/16/24 2141 08/16/24 2331 08/17/24 0116   BP: 164/71 135/63 115/56 108/51   BP Location: Right arm Right arm Left arm Right arm   Patient Position: Sitting Sitting Sitting Lying   Pulse: 94 85 82 74   Resp: 22 24 24 22   Temp: (!) 101.7 °F (38.7 °C) (!) 101.7 °F (38.7 °C)  98.5 °F (36.9 °C)   TempSrc: Axillary Oral  Rectal   SpO2: 96% 96% 96% 98%   Weight: 86.6 kg (191 lb)      Height: 160 cm (63\")        Oxygen Therapy: .    Active LDAs/IV Access:   Lines, Drains & Airways       Active LDAs       Name Placement date Placement time Site Days    Peripheral IV 08/16/24 2323 Anterior;Right Hand 08/16/24 2323  Hand  less than 1    Peripheral IV 08/16/24 2339 Anterior;Left Hand 08/16/24 2339  Hand  less than 1                    Labs (abnormal labs have a star):   Labs Reviewed   COMPREHENSIVE METABOLIC PANEL - Abnormal; Notable for the following components:       Result Value    BUN 29 (*)     Creatinine 2.11 (*)     Albumin 2.8 (*)     AST (SGOT) 50 (*)     Alkaline Phosphatase " 135 (*)     eGFR 24.6 (*)     All other components within normal limits    Narrative:     GFR Normal >60  Chronic Kidney Disease <60  Kidney Failure <15    The GFR formula is only valid for adults with stable renal function between ages 18 and 70.   PROTIME-INR - Abnormal; Notable for the following components:    Protime 17.4 (*)     INR 1.37 (*)     All other components within normal limits   APTT - Abnormal; Notable for the following components:    PTT 41.6 (*)     All other components within normal limits   URINALYSIS W/ MICROSCOPIC IF INDICATED (NO CULTURE) - Abnormal; Notable for the following components:    Color, UA Dark Yellow (*)     Appearance, UA Turbid (*)     Ketones, UA Trace (*)     Bilirubin, UA Small (1+) (*)     Blood, UA Large (3+) (*)     Protein, UA 30 mg/dL (1+) (*)     Leuk Esterase, UA Large (3+) (*)     Nitrite, UA Positive (*)     All other components within normal limits   SINGLE HS TROPONIN T - Abnormal; Notable for the following components:    HS Troponin T 70 (*)     All other components within normal limits    Narrative:     High Sensitive Troponin T Reference Range:  <14.0 ng/L- Negative Female for AMI  <22.0 ng/L- Negative Male for AMI  >=14 - Abnormal Female indicating possible myocardial injury.  >=22 - Abnormal Male indicating possible myocardial injury.   Clinicians would have to utilize clinical acumen, EKG, Troponin, and serial changes to determine if it is an Acute Myocardial Infarction or myocardial injury due to an underlying chronic condition.        CBC WITH AUTO DIFFERENTIAL - Abnormal; Notable for the following components:    RBC 3.02 (*)     Hemoglobin 8.6 (*)     Hematocrit 28.1 (*)     MCHC 30.6 (*)     RDW 21.2 (*)     RDW-SD 68.9 (*)     Platelets 115 (*)     All other components within normal limits   URINALYSIS, MICROSCOPIC ONLY - Abnormal; Notable for the following components:    RBC, UA 11-20 (*)     WBC, UA 21-50 (*)     Bacteria, UA 1+ (*)     All other  components within normal limits   BLOOD GAS, ARTERIAL W/CO-OXIMETRY - Abnormal; Notable for the following components:    pO2, Arterial 62.3 (*)     Base Excess, Arterial -0.8 (*)     O2 Saturation, Arterial 89.5 (*)     Hemoglobin, Blood Gas 9.5 (*)     Hematocrit, Blood Gas 29.2 (*)     Oxyhemoglobin 87.5 (*)     pO2, Temperature Corrected 62.3 (*)     All other components within normal limits   MANUAL DIFFERENTIAL - Abnormal; Notable for the following components:    Lymphocyte % 12.1 (*)     Bands %  15.2 (*)     Lymphocytes Absolute 0.60 (*)     All other components within normal limits   HIGH SENSITIVITIY TROPONIN T 2HR - Abnormal; Notable for the following components:    HS Troponin T 78 (*)     Troponin T Delta 8 (*)     All other components within normal limits    Narrative:     High Sensitive Troponin T Reference Range:  <14.0 ng/L- Negative Female for AMI  <22.0 ng/L- Negative Male for AMI  >=14 - Abnormal Female indicating possible myocardial injury.  >=22 - Abnormal Male indicating possible myocardial injury.   Clinicians would have to utilize clinical acumen, EKG, Troponin, and serial changes to determine if it is an Acute Myocardial Infarction or myocardial injury due to an underlying chronic condition.        LIPASE - Normal   LACTIC ACID, PLASMA - Normal   BLOOD CULTURE   BLOOD CULTURE   RAINBOW DRAW    Narrative:     The following orders were created for panel order [place] Draw.  Procedure                               Abnormality         Status                     ---------                               -----------         ------                     Green Top (Gel)[[id number]]                                  Final result               Lavender Top[[id number]]                                     Final result               Red Top[[id number]]                                          Final result               Light Blue Top[[id number]]                                   Final result                  Please view results for these tests on the individual orders.   BLOOD GAS, ARTERIAL   GREEN TOP   LAVENDER TOP   RED TOP   LIGHT BLUE TOP   CBC AND DIFFERENTIAL    Narrative:     The following orders were created for panel order CBC & Differential.  Procedure                               Abnormality         Status                     ---------                               -----------         ------                     CBC Auto Differential[[id number]]        Abnormal            Final result                 Please view results for these tests on the individual orders.       Meds given in ED:   Medications   sodium chloride 0.9 % flush 10 mL (has no administration in time range)   aspirin chewable tablet 324 mg (has no administration in time range)   sodium chloride 0.9 % bolus 1,000 mL (0 mL Intravenous Stopped 8/17/24 0052)   acetaminophen (TYLENOL) tablet 1,000 mg (1,000 mg Oral Given 8/16/24 2113)   sodium chloride 0.9 % bolus 1,000 mL (0 mL Intravenous Stopped 8/17/24 0052)   cefTRIAXone (ROCEPHIN) 1,000 mg in sodium chloride 0.9 % 100 mL MBP (0 mg Intravenous Stopped 8/16/24 2354)           NIH Stroke Scale:       Isolation/Infection(s):  No active isolations   MRSA, MDR Pseudomonas, CR Pseudomonas CRPA, CRE     COVID Testing  Collected .  Resulted .    Nursing report ED to floor:  Mental status: .  Ambulatory status: .  Precautions: .    ED nurse phone extentsion- ..

## 2024-08-17 NOTE — PLAN OF CARE
Goal Outcome Evaluation:  Plan of Care Reviewed With: patient, caregiver        Progress: no change  Outcome Evaluation: RDN assessment completed. Pt admitted with fever and confusion upon admission.Pt with reduced oral intake prior to admission. Weight loss of 14 lbs (6.6%) over the past two months. Unsure of bedscale wt vs. fluid loss. Regular diet. Oral intake 75% of lunch today. Encouraged oral intake. Con to follow for plan of care.

## 2024-08-18 PROBLEM — N39.0 UTI (URINARY TRACT INFECTION): Status: ACTIVE | Noted: 2024-08-18

## 2024-08-18 PROBLEM — N17.9 AKI (ACUTE KIDNEY INJURY): Status: ACTIVE | Noted: 2024-08-18

## 2024-08-18 LAB
ALBUMIN SERPL-MCNC: 2.3 G/DL (ref 3.5–5.2)
ALBUMIN/GLOB SERPL: 0.8 G/DL
ALP SERPL-CCNC: 103 U/L (ref 39–117)
ALT SERPL W P-5'-P-CCNC: 23 U/L (ref 1–33)
ANION GAP SERPL CALCULATED.3IONS-SCNC: 10 MMOL/L (ref 5–15)
AST SERPL-CCNC: 43 U/L (ref 1–32)
BASOPHILS # BLD AUTO: 0.04 10*3/MM3 (ref 0–0.2)
BASOPHILS NFR BLD AUTO: 1 % (ref 0–1.5)
BILIRUB SERPL-MCNC: 0.4 MG/DL (ref 0–1.2)
BUN SERPL-MCNC: 28 MG/DL (ref 8–23)
BUN/CREAT SERPL: 16.4 (ref 7–25)
CALCIUM SPEC-SCNC: 8.6 MG/DL (ref 8.6–10.5)
CHLORIDE SERPL-SCNC: 109 MMOL/L (ref 98–107)
CO2 SERPL-SCNC: 21 MMOL/L (ref 22–29)
CREAT SERPL-MCNC: 1.71 MG/DL (ref 0.57–1)
DEPRECATED RDW RBC AUTO: 69.6 FL (ref 37–54)
EGFRCR SERPLBLD CKD-EPI 2021: 31.7 ML/MIN/1.73
EOSINOPHIL # BLD AUTO: 0.15 10*3/MM3 (ref 0–0.4)
EOSINOPHIL NFR BLD AUTO: 3.7 % (ref 0.3–6.2)
ERYTHROCYTE [DISTWIDTH] IN BLOOD BY AUTOMATED COUNT: 21 % (ref 12.3–15.4)
GLOBULIN UR ELPH-MCNC: 2.8 GM/DL
GLUCOSE SERPL-MCNC: 102 MG/DL (ref 65–99)
HCT VFR BLD AUTO: 24 % (ref 34–46.6)
HGB BLD-MCNC: 7.3 G/DL (ref 12–15.9)
IMM GRANULOCYTES # BLD AUTO: 0.08 10*3/MM3 (ref 0–0.05)
IMM GRANULOCYTES NFR BLD AUTO: 2 % (ref 0–0.5)
LYMPHOCYTES # BLD AUTO: 0.98 10*3/MM3 (ref 0.7–3.1)
LYMPHOCYTES NFR BLD AUTO: 24.1 % (ref 19.6–45.3)
MCH RBC QN AUTO: 28.3 PG (ref 26.6–33)
MCHC RBC AUTO-ENTMCNC: 30.4 G/DL (ref 31.5–35.7)
MCV RBC AUTO: 93 FL (ref 79–97)
MONOCYTES # BLD AUTO: 0.86 10*3/MM3 (ref 0.1–0.9)
MONOCYTES NFR BLD AUTO: 21.2 % (ref 5–12)
NEUTROPHILS NFR BLD AUTO: 1.95 10*3/MM3 (ref 1.7–7)
NEUTROPHILS NFR BLD AUTO: 48 % (ref 42.7–76)
PLATELET # BLD AUTO: 90 10*3/MM3 (ref 140–450)
PMV BLD AUTO: ABNORMAL FL
POTASSIUM SERPL-SCNC: 4.1 MMOL/L (ref 3.5–5.2)
PROT SERPL-MCNC: 5.1 G/DL (ref 6–8.5)
RBC # BLD AUTO: 2.58 10*6/MM3 (ref 3.77–5.28)
SODIUM SERPL-SCNC: 140 MMOL/L (ref 136–145)
WBC NRBC COR # BLD AUTO: 4.06 10*3/MM3 (ref 3.4–10.8)

## 2024-08-18 PROCEDURE — 85025 COMPLETE CBC W/AUTO DIFF WBC: CPT | Performed by: INTERNAL MEDICINE

## 2024-08-18 PROCEDURE — 25810000003 SODIUM CHLORIDE 0.9 % SOLUTION: Performed by: INTERNAL MEDICINE

## 2024-08-18 PROCEDURE — 87147 CULTURE TYPE IMMUNOLOGIC: CPT | Performed by: INTERNAL MEDICINE

## 2024-08-18 PROCEDURE — 25010000002 VANCOMYCIN PER 500 MG: Performed by: INTERNAL MEDICINE

## 2024-08-18 PROCEDURE — 25010000002 CEFEPIME PER 500 MG: Performed by: INTERNAL MEDICINE

## 2024-08-18 PROCEDURE — 80053 COMPREHEN METABOLIC PANEL: CPT | Performed by: INTERNAL MEDICINE

## 2024-08-18 PROCEDURE — 25010000002 VANCOMYCIN 10 G RECONSTITUTED SOLUTION: Performed by: INTERNAL MEDICINE

## 2024-08-18 PROCEDURE — 87040 BLOOD CULTURE FOR BACTERIA: CPT | Performed by: INTERNAL MEDICINE

## 2024-08-18 RX ORDER — NYSTATIN 100000/ML
5 SUSPENSION, ORAL (FINAL DOSE FORM) ORAL 4 TIMES DAILY
Status: DISCONTINUED | OUTPATIENT
Start: 2024-08-18 | End: 2024-08-20 | Stop reason: HOSPADM

## 2024-08-18 RX ADMIN — LEVOTHYROXINE SODIUM 75 MCG: 75 TABLET ORAL at 06:23

## 2024-08-18 RX ADMIN — CARVEDILOL 25 MG: 25 TABLET, FILM COATED ORAL at 08:13

## 2024-08-18 RX ADMIN — OXYBUTYNIN CHLORIDE 10 MG: 5 TABLET, EXTENDED RELEASE ORAL at 08:13

## 2024-08-18 RX ADMIN — NYSTATIN 500000 UNITS: 100000 SUSPENSION ORAL at 20:17

## 2024-08-18 RX ADMIN — Medication 10 ML: at 20:18

## 2024-08-18 RX ADMIN — LEFLUNOMIDE 20 MG: 20 TABLET ORAL at 08:13

## 2024-08-18 RX ADMIN — ACETAMINOPHEN 650 MG: 325 TABLET ORAL at 11:56

## 2024-08-18 RX ADMIN — APIXABAN 5 MG: 5 TABLET, FILM COATED ORAL at 08:13

## 2024-08-18 RX ADMIN — DULOXETINE HYDROCHLORIDE 60 MG: 30 CAPSULE, DELAYED RELEASE ORAL at 08:13

## 2024-08-18 RX ADMIN — ISOSORBIDE MONONITRATE 60 MG: 60 TABLET, EXTENDED RELEASE ORAL at 08:13

## 2024-08-18 RX ADMIN — SODIUM CHLORIDE 1250 MG: 9 INJECTION, SOLUTION INTRAVENOUS at 18:31

## 2024-08-18 RX ADMIN — CEFEPIME 2000 MG: 2 INJECTION, POWDER, FOR SOLUTION INTRAVENOUS at 18:32

## 2024-08-18 RX ADMIN — Medication 10 ML: at 08:18

## 2024-08-18 RX ADMIN — ALLOPURINOL 100 MG: 100 TABLET ORAL at 08:13

## 2024-08-18 RX ADMIN — VANCOMYCIN HYDROCHLORIDE 500 MG: 500 INJECTION, POWDER, LYOPHILIZED, FOR SOLUTION INTRAVENOUS at 06:24

## 2024-08-18 RX ADMIN — APIXABAN 5 MG: 5 TABLET, FILM COATED ORAL at 20:17

## 2024-08-18 RX ADMIN — PANTOPRAZOLE SODIUM 40 MG: 40 TABLET, DELAYED RELEASE ORAL at 08:13

## 2024-08-18 NOTE — PROGRESS NOTES
"Pharmacy Dosing Service  Pharmacokinetics  Vancomycin Follow-up Evaluation    Assessment/Action/Plan:  Current Order: Vancomycin 500 mg IVPB every 12 hours  Current end date:8/20/2024  Levels: no levels ordered at this time  Additional antimicrobial agent(s): Cefepime    Vancomycin dose adjusted to 1250 mg iv Q24H. Pharmacy will continue to follow daily and adjust dose accordingly.     Subjective:  Tawny Shin is a 69 y.o. female currently on Vancomycin for the treatment of bacteremia, day 2 of 4 of treatment.    AUC Model Data:  Regimen: 1250 mg IV every 24 hours.  Start time: 18:00 on 08/19/2024  Exposure target: AUC24 (range)400-600 mg/L.hr   AUC24,ss: 595 mg/L.hr  PAUC*: 88 %  Ctrough,ss: 19.6 mg/L  Pconc*: 48 %  Tox.: 17 %      Objective:  Ht: 160 cm (62.99\"); Wt: 88.9 kg (195 lb 15.8 oz)  Estimated Creatinine Clearance: 31.9 mL/min (A) (by C-G formula based on SCr of 1.71 mg/dL (H)).   Creatinine   Date Value Ref Range Status   08/18/2024 1.71 (H) 0.57 - 1.00 mg/dL Final   08/17/2024 1.99 (H) 0.57 - 1.00 mg/dL Final   08/16/2024 2.11 (H) 0.57 - 1.00 mg/dL Final   07/17/2024 0.7 0.5 - 0.9 mg/dL Final   07/16/2024 0.6 0.5 - 0.9 mg/dL Final   07/15/2024 0.7 0.5 - 0.9 mg/dL Final      Lab Results   Component Value Date    WBC 4.06 08/18/2024    WBC 3.64 08/17/2024    WBC 4.95 08/16/2024         Lab Results   Component Value Date    VANCOTROUGH 15.2 07/20/2024       Culture Results:  Microbiology Results (last 10 days)       Procedure Component Value - Date/Time    Urine Culture - Urine, Urine, Catheter [[id number]]  (Abnormal) Collected: 08/17/24 0334    Lab Status: Preliminary result Specimen: Urine, Catheter Updated: 08/18/24 1050     Urine Culture >100,000 CFU/mL Gram Negative Bacilli    Narrative:      Colonization of the urinary tract without infection is common. Treatment is discouraged unless the patient is symptomatic, pregnant, or undergoing an invasive urologic procedure.    Blood Culture - Blood, " Hand, Right [[id number]]  (Normal) Collected: 08/16/24 2050    Lab Status: Preliminary result Specimen: Blood from Hand, Right Updated: 08/17/24 2115     Blood Culture No growth at 24 hours    Blood Culture - Blood, Arm, Left [[id number]]  (Abnormal) Collected: 08/16/24 2050    Lab Status: Preliminary result Specimen: Blood from Arm, Left Updated: 08/17/24 1605     Blood Culture Abnormal Stain     Gram Stain Aerobic Bottle Gram positive cocci    Blood Culture ID, PCR - Blood, Arm, Left [[id number]]  (Abnormal) Collected: 08/16/24 2050    Lab Status: Final result Specimen: Blood from Arm, Left Updated: 08/17/24 1724     BCID, PCR Staph spp, not aureus or lugdunensis. Identification by BCID2 PCR.     BOTTLE TYPE Aerobic Bottle            Wesley Sanders, [place]   08/18/24 16:56 CDT

## 2024-08-18 NOTE — PROGRESS NOTES
"Pharmacy Renal Dose Conversion    Tawny Shin is a 69 y.o. year old female  160 cm (62.99\") 88.9 kg (195 lb 15.8 oz)    Estimated Creatinine Clearance: 31.9 mL/min (A) (by C-G formula based on SCr of 1.71 mg/dL (H)).   Creatinine   Date Value Ref Range Status   08/18/2024 1.71 (H) 0.57 - 1.00 mg/dL Final   08/17/2024 1.99 (H) 0.57 - 1.00 mg/dL Final   08/16/2024 2.11 (H) 0.57 - 1.00 mg/dL Final   07/17/2024 0.7 0.5 - 0.9 mg/dL Final   07/16/2024 0.6 0.5 - 0.9 mg/dL Final   07/15/2024 0.7 0.5 - 0.9 mg/dL Final       PLAN  Based on prescribing information provided by the drug , Cefepime 2000mg iv Q24H,  has been changed to Cefepime 2000mg iv Q12H    Adjusted per the directives and guidelines established by [facility] Pharmacy and Therapeutics Committee and [facility] Medical Executive Committee.  Pharmacy will continue to monitor daily and make further adjustment(s) accordingly.    Wesley Sanders, PharmD  08/18/2417:01 CDT    "

## 2024-08-18 NOTE — PROGRESS NOTES
[facility] Medicine Services  INPATIENT PROGRESS NOTE    Patient Name: Tawny Shin  Date of Admission: 8/16/2024  Today's Date: 08/18/24  Length of Stay: 0  Primary Care Physician: Moises Oliveira MD    Subjective   Chief Complaint: follow-up confusion  HPI   Tmax of 102.4 overnight.  In spite of this, patient states that she feels much better.  Her previous symptoms of confusion and change in mental status are markedly better.  She is currently visiting with a friend at bedside, and her caregiver is also present.  She denies any new pain symptoms.  No acute events from overnight.  She reports that she is very eager to get back home.    Review of Systems   All pertinent negatives and positives are as above. All other systems have been reviewed and are negative unless otherwise stated.     Objective    Temp:  [97 °F (36.1 °C)-102.4 °F (39.1 °C)] 98 °F (36.7 °C)  Heart Rate:  [] 74  Resp:  [18-22] 20  BP: (107-132)/(47-57) 132/54  Physical Exam  Vitals reviewed.   Constitutional:       General: She is not in acute distress.     Comments: She looks much, much better this morning   HENT:      Head: Normocephalic.      Mouth/Throat:      Mouth: Mucous membranes are moist.   Pulmonary:      Effort: Pulmonary effort is normal. No respiratory distress.      Comments: On supp 02 via nasal cannula  Skin:     General: Skin is warm.   Neurological:      Mental Status: She is alert and oriented to person, place, and time.      Comments: Much more alert; good historian   Psychiatric:         Mood and Affect: Mood normal.         Results Review:  I have reviewed the labs, radiology results, and diagnostic studies.    Laboratory Data:   Results from last 7 days   Lab Units 08/18/24  0419 08/17/24  0422 08/16/24  2123   WBC 10*3/mm3 4.06 3.64 4.95   HEMOGLOBIN g/dL 7.3* 7.7* 8.6*   HEMATOCRIT % 24.0* 25.7* 28.1*   PLATELETS 10*3/mm3 90* 104* 115*        Results from last 7 days    Lab Units 08/18/24  0419 08/17/24  0422 08/16/24  2123   SODIUM mmol/L 140 140 139   POTASSIUM mmol/L 4.1 4.4 4.3   CHLORIDE mmol/L 109* 108* 103   CO2 mmol/L 21.0* 19.0* 23.0   BUN mg/dL 28* 30* 29*   CREATININE mg/dL 1.71* 1.99* 2.11*   CALCIUM mg/dL 8.6 8.3* 9.4   BILIRUBIN mg/dL 0.4 0.4 0.5   ALK PHOS U/L 103 116 135*   ALT (SGPT) U/L 23 23 28   AST (SGOT) U/L 43* 45* 50*   GLUCOSE mg/dL 102* 102* 99       Culture Data:   Blood Culture   Date Value Ref Range Status   08/16/2024 No growth at 24 hours  Preliminary   08/16/2024 Abnormal Stain (C)  Preliminary       Radiology Data:   Imaging Results (Last 24 Hours)       ** No results found for the last 24 hours. **            I have reviewed the patient's current medications.     Assessment/Plan   Assessment  Active Hospital Problems    Diagnosis     **UTI (urinary tract infection), bacterial     PINKY (acute kidney injury)     Positive blood culture     Anemia of chronic disease     Sick sinus syndrome     Chronic heart failure with preserved ejection fraction     Chronic anticoagulation     Iron deficiency anemia     Chronic embolism and thrombosis of unspecified deep veins of left lower extremity        Treatment Plan  1 blood culture has returned positive for staph species, but not Staph aureus or lugdunensis.  Patient started on IV vancomycin pending results of these culture studies, my hope of which will be contamination but this is still pending  She does have a known pacemaker for history of SSS.  I will repeat blood culture with BREONNA  I broadened antibiotics from ceftriaxone to cefepime yesterday.  Day 2 of cefepime today.  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  Reviewing previous labs indicates that patient's baseline creatinine is around 0.7-0.8.  Need to be mindful that her recent medical history does include hydronephrosis with urinary obstruction due to ureteral calculus status post ureteroscopy and laser lithotripsy with stent insertion, and in addition has a recent history of acute cholecystitis status post laparoscopic cholecystectomy.  Please note that admission CT of the abdomen and pelvis did not reveal any findings of obstructive uropathy.  Wound care consultation.  Patient has a chronic wound with wound VAC to the gluteal region and it sounds like that it was last changed on Friday.   Patient currently receiving intravenous fluids.  She does have a history of chronic diastolic congestive heart failure.  Will need to monitor her volume status closely.  History of DVT and on chronic anticoagulation with Eliquis  Hold losartan and Aldactone in setting of PINKY  Repeat CBC and BMP in AM  Updated her son, Dr. Shin, this morning via phone call    Medical Decision Making  Number and Complexity of problems: 2 acute; high complexity      Conditions and Status        Condition is improving.     MDM Data  External documents reviewed: none  Cardiac tracing (EKG, telemetry) interpretation: no new EKGs  Radiology interpretation: no new radiology studies  Labs reviewed: as above  Any tests that were considered but not ordered: none     Decision rules/scores evaluated (example [id number], Wells, etc): none     Discussed with: patient, caregiver at bedside, her son (Dr. Gee Shin)     Care Planning  Shared decision making: Discussed with patient with agreement to proceed with treatment plan as outlined  Code status and discussions: Full code    Disposition  Social Determinants of Health that impact treatment or disposition: None apparent at this time  I expect the patient to be discharged to home  with caregivers in hopefully 2-3 days    Electronically signed by Butch Chong MD, 08/18/24, 11:06 CDT.

## 2024-08-19 ENCOUNTER — HOME CARE VISIT (OUTPATIENT)
Dept: HOME HEALTH SERVICES | Facility: HOME HEALTHCARE | Age: 69
End: 2024-08-19
Payer: MEDICARE

## 2024-08-19 VITALS
DIASTOLIC BLOOD PRESSURE: 70 MMHG | SYSTOLIC BLOOD PRESSURE: 122 MMHG | TEMPERATURE: 97 F | HEART RATE: 68 BPM | OXYGEN SATURATION: 98 % | RESPIRATION RATE: 20 BRPM

## 2024-08-19 PROBLEM — B96.20 E. COLI UTI (URINARY TRACT INFECTION): Status: ACTIVE | Noted: 2024-08-18

## 2024-08-19 LAB
ABO GROUP BLD: NORMAL
ANION GAP SERPL CALCULATED.3IONS-SCNC: 10 MMOL/L (ref 5–15)
BACTERIA SPEC AEROBE CULT: ABNORMAL
BASOPHILS # BLD AUTO: 0.04 10*3/MM3 (ref 0–0.2)
BASOPHILS NFR BLD AUTO: 0.9 % (ref 0–1.5)
BLD GP AB SCN SERPL QL: NEGATIVE
BUN SERPL-MCNC: 25 MG/DL (ref 8–23)
BUN/CREAT SERPL: 16.7 (ref 7–25)
CALCIUM SPEC-SCNC: 8.8 MG/DL (ref 8.6–10.5)
CHLORIDE SERPL-SCNC: 109 MMOL/L (ref 98–107)
CO2 SERPL-SCNC: 21 MMOL/L (ref 22–29)
CREAT SERPL-MCNC: 1.5 MG/DL (ref 0.57–1)
DEPRECATED RDW RBC AUTO: 68.3 FL (ref 37–54)
EGFRCR SERPLBLD CKD-EPI 2021: 37.1 ML/MIN/1.73
EOSINOPHIL # BLD AUTO: 0.36 10*3/MM3 (ref 0–0.4)
EOSINOPHIL NFR BLD AUTO: 8 % (ref 0.3–6.2)
ERYTHROCYTE [DISTWIDTH] IN BLOOD BY AUTOMATED COUNT: 21.2 % (ref 12.3–15.4)
GLUCOSE SERPL-MCNC: 80 MG/DL (ref 65–99)
GRAM STN SPEC: ABNORMAL
HCT VFR BLD AUTO: 23.2 % (ref 34–46.6)
HGB BLD-MCNC: 7.1 G/DL (ref 12–15.9)
IMM GRANULOCYTES # BLD AUTO: 0.07 10*3/MM3 (ref 0–0.05)
IMM GRANULOCYTES NFR BLD AUTO: 1.6 % (ref 0–0.5)
ISOLATED FROM: ABNORMAL
LYMPHOCYTES # BLD AUTO: 1.35 10*3/MM3 (ref 0.7–3.1)
LYMPHOCYTES NFR BLD AUTO: 30 % (ref 19.6–45.3)
MCH RBC QN AUTO: 28.5 PG (ref 26.6–33)
MCHC RBC AUTO-ENTMCNC: 30.6 G/DL (ref 31.5–35.7)
MCV RBC AUTO: 93.2 FL (ref 79–97)
MONOCYTES # BLD AUTO: 0.85 10*3/MM3 (ref 0.1–0.9)
MONOCYTES NFR BLD AUTO: 18.9 % (ref 5–12)
NEUTROPHILS NFR BLD AUTO: 1.83 10*3/MM3 (ref 1.7–7)
NEUTROPHILS NFR BLD AUTO: 40.6 % (ref 42.7–76)
PLATELET # BLD AUTO: 97 10*3/MM3 (ref 140–450)
PMV BLD AUTO: ABNORMAL FL
POTASSIUM SERPL-SCNC: 3.8 MMOL/L (ref 3.5–5.2)
RBC # BLD AUTO: 2.49 10*6/MM3 (ref 3.77–5.28)
RH BLD: POSITIVE
SODIUM SERPL-SCNC: 140 MMOL/L (ref 136–145)
T&S EXPIRATION DATE: NORMAL
WBC NRBC COR # BLD AUTO: 4.5 10*3/MM3 (ref 3.4–10.8)

## 2024-08-19 PROCEDURE — 25810000003 LACTATED RINGERS PER 1000 ML: Performed by: INTERNAL MEDICINE

## 2024-08-19 PROCEDURE — P9016 RBC LEUKOCYTES REDUCED: HCPCS

## 2024-08-19 PROCEDURE — 36430 TRANSFUSION BLD/BLD COMPNT: CPT

## 2024-08-19 PROCEDURE — 97166 OT EVAL MOD COMPLEX 45 MIN: CPT

## 2024-08-19 PROCEDURE — 85025 COMPLETE CBC W/AUTO DIFF WBC: CPT | Performed by: INTERNAL MEDICINE

## 2024-08-19 PROCEDURE — 80048 BASIC METABOLIC PNL TOTAL CA: CPT | Performed by: INTERNAL MEDICINE

## 2024-08-19 PROCEDURE — 86900 BLOOD TYPING SEROLOGIC ABO: CPT

## 2024-08-19 PROCEDURE — 97164 PT RE-EVAL EST PLAN CARE: CPT

## 2024-08-19 PROCEDURE — 99222 1ST HOSP IP/OBS MODERATE 55: CPT

## 2024-08-19 PROCEDURE — 25010000002 CEFTRIAXONE PER 250 MG: Performed by: FAMILY MEDICINE

## 2024-08-19 PROCEDURE — 86901 BLOOD TYPING SEROLOGIC RH(D): CPT | Performed by: FAMILY MEDICINE

## 2024-08-19 PROCEDURE — 92526 ORAL FUNCTION THERAPY: CPT

## 2024-08-19 PROCEDURE — 86923 COMPATIBILITY TEST ELECTRIC: CPT

## 2024-08-19 PROCEDURE — 86850 RBC ANTIBODY SCREEN: CPT | Performed by: FAMILY MEDICINE

## 2024-08-19 PROCEDURE — 25010000002 CEFEPIME PER 500 MG: Performed by: INTERNAL MEDICINE

## 2024-08-19 PROCEDURE — 86900 BLOOD TYPING SEROLOGIC ABO: CPT | Performed by: FAMILY MEDICINE

## 2024-08-19 PROCEDURE — 97605 NEG PRS WND THER DME<=50SQCM: CPT

## 2024-08-19 PROCEDURE — 97162 PT EVAL MOD COMPLEX 30 MIN: CPT

## 2024-08-19 RX ORDER — FOLIC ACID 1 MG/1
1 TABLET ORAL DAILY
Qty: 30 TABLET | Refills: 0 | OUTPATIENT
Start: 2024-08-19

## 2024-08-19 RX ADMIN — ISOSORBIDE MONONITRATE 60 MG: 60 TABLET, EXTENDED RELEASE ORAL at 08:51

## 2024-08-19 RX ADMIN — NYSTATIN 500000 UNITS: 100000 SUSPENSION ORAL at 08:52

## 2024-08-19 RX ADMIN — CARVEDILOL 25 MG: 25 TABLET, FILM COATED ORAL at 08:52

## 2024-08-19 RX ADMIN — Medication 10 ML: at 20:27

## 2024-08-19 RX ADMIN — DULOXETINE HYDROCHLORIDE 60 MG: 30 CAPSULE, DELAYED RELEASE ORAL at 08:52

## 2024-08-19 RX ADMIN — APIXABAN 5 MG: 5 TABLET, FILM COATED ORAL at 08:52

## 2024-08-19 RX ADMIN — NYSTATIN 500000 UNITS: 100000 SUSPENSION ORAL at 20:27

## 2024-08-19 RX ADMIN — ALLOPURINOL 100 MG: 100 TABLET ORAL at 08:51

## 2024-08-19 RX ADMIN — PANTOPRAZOLE SODIUM 40 MG: 40 TABLET, DELAYED RELEASE ORAL at 08:52

## 2024-08-19 RX ADMIN — NYSTATIN 500000 UNITS: 100000 SUSPENSION ORAL at 18:31

## 2024-08-19 RX ADMIN — LEVOTHYROXINE SODIUM 75 MCG: 75 TABLET ORAL at 05:02

## 2024-08-19 RX ADMIN — SODIUM CHLORIDE 1000 MG: 900 INJECTION INTRAVENOUS at 13:37

## 2024-08-19 RX ADMIN — SODIUM CHLORIDE, POTASSIUM CHLORIDE, SODIUM LACTATE AND CALCIUM CHLORIDE 100 ML/HR: 600; 310; 30; 20 INJECTION, SOLUTION INTRAVENOUS at 14:32

## 2024-08-19 RX ADMIN — CARVEDILOL 25 MG: 25 TABLET, FILM COATED ORAL at 18:31

## 2024-08-19 RX ADMIN — APIXABAN 5 MG: 5 TABLET, FILM COATED ORAL at 20:26

## 2024-08-19 RX ADMIN — Medication 10 ML: at 09:09

## 2024-08-19 RX ADMIN — OXYBUTYNIN CHLORIDE 10 MG: 5 TABLET, EXTENDED RELEASE ORAL at 08:51

## 2024-08-19 RX ADMIN — LEFLUNOMIDE 20 MG: 20 TABLET ORAL at 08:52

## 2024-08-19 RX ADMIN — NYSTATIN 500000 UNITS: 100000 SUSPENSION ORAL at 13:37

## 2024-08-19 RX ADMIN — CEFEPIME 2000 MG: 2 INJECTION, POWDER, FOR SOLUTION INTRAVENOUS at 05:02

## 2024-08-19 NOTE — PLAN OF CARE
Goal Outcome Evaluation:  Plan of Care Reviewed With: patient, family        Progress: no change  Outcome Evaluation: Patient oriented to person and situation. No c/o pain. VSS. No major changes during the shift.

## 2024-08-19 NOTE — PROGRESS NOTES
[facility] Medicine Services  INPATIENT PROGRESS NOTE    Patient Name: Tawny Shin  Date of Admission: 8/16/2024  Today's Date: 08/19/24  Length of Stay: 1  Primary Care Physician: Moises Oliveira MD    Subjective   Chief Complaint: No complaint  HPI     The patient appears to be back to her typical cognitive baseline.  She is alert and very conversant today.  She appears pale and becomes mildly short of breath with conversation.  She recognized me from her previous hospitalizations.  Urine culture revealed near pansensitive E. coli.  IV antibiotics will be changed to Rocephin and she will be transitioned to cefdinir at discharge.  I would recommend antibiotic suppressive therapy at discharge with nitrofurantoin 100 mg at at bedtime.  Hemoglobin this a.m. 7.1 and she will be transfused 1 unit of PRBCs.  Hemoglobin will be repeated in AM.  She can probably be discharged home tomorrow as she has 24-hour caregivers.  Case discussed with her son, Dr. Shin.    Review of Systems   All pertinent negatives and positives are as above. All other systems have been reviewed and are negative unless otherwise stated.     Objective    Temp:  [97.5 °F (36.4 °C)-98.3 °F (36.8 °C)] 98.2 °F (36.8 °C)  Heart Rate:  [72-76] 72  Resp:  [16-20] 18  BP: (107-144)/(43-50) 118/48  Physical Exam    Constitutional:       General: She is not in acute distress.     Comments: She is alert, conversant and bright.  HENT:      Head: Normocephalic.      Mouth/Throat:      Mouth: Mucous membranes are moist.   Pulmonary:      Effort: Pulmonary effort is normal. No respiratory distress.      Comments: On supp 02 via nasal cannula  Skin:     General: Skin is warm.   Neurological:      Mental Status: She is alert and oriented to person, place, and time.      Comments: Much more alert and at baseline  Psychiatric:         Mood and Affect: Mood normal.     Results Review:  I have reviewed the labs, radiology  results, and diagnostic studies.    Laboratory Data:   Results from last 7 days   Lab Units 08/19/24  0422 08/18/24 0419 08/17/24 0422   WBC 10*3/mm3 4.50 4.06 3.64   HEMOGLOBIN g/dL 7.1* 7.3* 7.7*   HEMATOCRIT % 23.2* 24.0* 25.7*   PLATELETS 10*3/mm3 97* 90* 104*        Results from last 7 days   Lab Units 08/19/24  0422 08/18/24 0419 08/17/24 0422 08/16/24  2123   SODIUM mmol/L 140 140 140 139   POTASSIUM mmol/L 3.8 4.1 4.4 4.3   CHLORIDE mmol/L 109* 109* 108* 103   CO2 mmol/L 21.0* 21.0* 19.0* 23.0   BUN mg/dL 25* 28* 30* 29*   CREATININE mg/dL 1.50* 1.71* 1.99* 2.11*   CALCIUM mg/dL 8.8 8.6 8.3* 9.4   BILIRUBIN mg/dL  --  0.4 0.4 0.5   ALK PHOS U/L  --  103 116 135*   ALT (SGPT) U/L  --  23 23 28   AST (SGOT) U/L  --  43* 45* 50*   GLUCOSE mg/dL 80 102* 102* 99       Culture Data:   Blood Culture   Date Value Ref Range Status   08/18/2024 Abnormal Stain (C)  Preliminary   08/16/2024 No growth at 2 days  Preliminary   08/16/2024 Staphylococcus, coagulase negative (C)  Final     Urine Culture   Date Value Ref Range Status   08/17/2024 >100,000 CFU/mL Escherichia coli (A)  Preliminary       Radiology Data:   Imaging Results (Last 24 Hours)       ** No results found for the last 24 hours. **            I have reviewed the patient's current medications.     Assessment/Plan   Assessment  Active Hospital Problems    Diagnosis     **E. coli UTI (urinary tract infection)     PINKY (acute kidney injury)     Positive blood culture, contaminant     Anemia of chronic disease     Sick sinus syndrome     Chronic heart failure with preserved ejection fraction     Chronic anticoagulation     Iron deficiency anemia     Chronic embolism and thrombosis of unspecified deep veins of left lower extremity        Treatment Plan  Discontinue cefepime  Rocephin 1 g IV every 24 hours  T/C/I 1 unit PRBCs.  Probable discharge tomorrow    Medical Decision Making  Number and Complexity of problems: 2 acute; high complexity        Conditions  and Status        Condition is improving.     [facility] Data  External documents reviewed: none  Cardiac tracing (EKG, telemetry) interpretation: See HPI  Radiology interpretation: See HPI  Labs reviewed: See HPI  Any tests that were considered but not ordered: none     Decision rules/scores evaluated (example [id number], Wells, etc): none     Discussed with: patient, caregiver at bedside, her son (Dr. Gee Shin)     Care Planning  Shared decision making: Discussed with patient with agreement to proceed with treatment plan as outlined  Code status and discussions: Full code     Disposition  Social Determinants of Health that impact treatment or disposition: None apparent at this time  I expect the patient to be discharged to home with caregivers in 1 day.    Electronically signed by Nayan Hale DO, 08/19/24, 14:05 CDT.

## 2024-08-19 NOTE — THERAPY EVALUATION
Acute Care - Occupational Therapy Initial Evaluation  [facility]     Patient Name: Tawny Shin  : 1955  MRN: [id number]  Today's Date: 2024     Date of Referral to OT: 24       Admit Date: 2024       ICD-10-CM ICD-9-CM   1. Urinary tract infection, bacterial  N39.0 599.0    A49.9 041.9   2. Demand ischemia  I24.89 411.89   3. Esophageal dysphagia  R13.19 787.29   4. Impaired mobility [Z74.09]  Z74.09 799.89     Patient Active Problem List   Diagnosis    T12 compression fracture, initial encounter    Chronic embolism and thrombosis of unspecified deep veins of left lower extremity    Chronic anticoagulation    Iron deficiency anemia    Osteoporosis    E coli bacteremia    Epidural hematoma    Pleural effusion, left    Functional neurological symptom disorder with weakness or paralysis    Class 1 obesity due to excess calories with serious comorbidity and body mass index (BMI) of 33.0 to 33.9 in adult    Rheumatoid arthritis involving multiple sites    Chronic heart failure with preserved ejection fraction    Venous insufficiency    Coronary artery disease involving native coronary artery of native heart without angina pectoris    Sick sinus syndrome    Essential hypertension    Pressure injury of skin of heel    Chronic pain    Anemia of chronic disease    Cholelithiasis    Pressure injury of skin of buttock    Near functional paraplegia    Skin cancer    Squamous cell carcinoma of back    Hematoma    Right-sided chest wall pain    Hyperlipidemia LDL goal <70    Malodorous urine    Stage 3b chronic kidney disease    Cyst of buttocks    Sepsis due to Escherichia coli without acute organ dysfunction    Generalized weakness    Paralysis    History of urinary retention    Bacteremia due to Enterobacter species    Positive blood culture, contaminant    Hypothyroidism    Abnormal CT of the abdomen    Presence of cardiac pacemaker    Esophageal dysphagia    Acute gout    PINKY (acute kidney  "injury)    E. 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    Elevated cholesterol     Eustachian tube dysfunction     Heart disease     Herpes simplex     History of transfusion     Hyperlipidemia     Hypertension     Hypothyroidism 12/23/2020    Intrinsic asthma 12/23/2020    Knee dislocation     Labral tear of right hip joint     Laryngitis sicca     Laryngitis, chronic     Left carotid bruit 03/09/2016    MI (myocardial infarction)     Myalgia due to statin 06/25/2019    Open wound of right hip 09/14/2021    Osteomyelitis of right femur 07/06/2021    Otorrhea     Pacemaker 11/17/2016    Primary osteoarthritis of left knee 12/23/2020    Psoriasis vulgaris 12/23/2020    S/P coronary artery stent placement 03/09/2016    Sensorineural hearing loss     Seropositive rheumatoid arthritis of multiple sites 12/27/2019    Overview:  -myochrysine '93-'96 -methotrexate '96--->11/98;r/s  restarted 2/99--> 8/14 (anemia) -sulfasalazine- not effective -penicillamine 6/98-->10/98; no effect -leflunomide 11/98--> - Humira '13-->didn't take - Enbrel 12/14-->3/15- no effect!   Last Assessment & Plan:  - \"aching all over\" because she had to be off her anti-rheumatic drugs for 2 weeks in preparation for her R knee surgery - he    Sick sinus syndrome 12/27/2019    Sjogren's disease     Spondylolisthesis of lumbar region 01/17/2018    Syncope, recurrent 02/08/2021    Urinary tract " infection     UTI (urinary tract infection) 04/19/2024    UTI (urinary tract infection)      Past Surgical History:   Procedure Laterality Date    A-V CARDIAC PACEMAKER INSERTION  2016    ATRIAL CARDIAC PACEMAKER INSERTION      CARDIAC CATHETERIZATION      CATARACT EXTRACTION      CERVICAL CORPECTOMY N/A 03/03/2021    Procedure: CERVICAL 6 CORPECTOMY WITH TITANIUM CAGE WITH NEURO MONITORING;  Surgeon: Bandar Shea MD;  Location: [facility] OR;  Service: Neurosurgery;  Laterality: N/A;    CHOLECYSTECTOMY WITH INTRAOPERATIVE CHOLANGIOGRAM N/A 07/03/2024    Procedure: CHOLECYSTECTOMY LAPAROSCOPIC WITH INTRAOPERATIVE CHOLANGIOGRAM;  Surgeon: Moises Keyes MD;  Location:  [facility] OR;  Service: General;  Laterality: N/A;    COLONOSCOPY  11/08/2011    One fold in the ascending colon which showed ulcer otherwise normal exam    COLONOSCOPY  11/12/2004    Normal exam repeat in five years    CORONARY ANGIOPLASTY WITH STENT PLACEMENT      X 2; 2013 & 2014    CYSTOSCOPY BLADDER STONE LITHOTRIPSY      ENDOSCOPY  07/10/2014    Normal exam    ENDOSCOPY N/A 05/30/2024    Procedure: ESOPHAGOGASTRODUODENOSCOPY WITH ANESTHESIA;  Surgeon: Taiwo Sanchez MD;  Location: [facility] ENDOSCOPY;  Service: Gastroenterology;  Laterality: N/A;  preop; dysphagia  postop: balloon dilation  PCP Jesus Manuel jeff    FLAP LEG Right 09/14/2021    Procedure: RIGHT GLUTEAL FASCIOCUTANEOUS ADVANCEMENT FLAP AND RIGHT TENSOR FASCIAL JESSICA FLAP;  Surgeon: Amadeo Turner MD;  Location: [facility] OR;  Service: Plastics;  Laterality: Right;    GALLBLADDER SURGERY      HIP ABDUCTION TENOTOMY BILATERAL Right 01/14/2021    Procedure: RIGHT HIP GLUTEUS MEDLUS / MINIMUS REPAIR, POSSIBLE ACHILLES ALLOGRAFT;  Surgeon: Nino Carlson MD;  Location: [facility] OR;  Service: Orthopedics;  Laterality: Right;    INCISION AND DRAINAGE ABSCESS Right 06/04/2022    Procedure: INCISION AND DRAINAGE ABSCESS right hip;  Surgeon: Magda Salcido MD;  Location:  [facility] OR;  Service:  General;  Laterality: Right;    INCISION AND DRAINAGE ABSCESS Right 06/10/2022    Procedure: RIGHT HIP INCISION AND DRAINAGE. MD NEEDS 3L VANC IRRIGATION, CURRLORIN, DAICANS, KERLEX ROLLS;  Surgeon: Amadeo Turner MD;  Location:  [facility] OR;  Service: Plastics;  Laterality: Right;    INCISION AND DRAINAGE HIP Right 02/09/2021    Procedure: HIP INCISION AND DRAINAGE;  Surgeon: Nino Carlson MD;  Location:  [facility] OR;  Service: Orthopedics;  Laterality: Right;    INCISION AND DRAINAGE LEG Right 10/24/2021    Procedure: INCISION AND DRAINAGE LOWER EXTREMITY;  Surgeon: Amadeo Turner MD;  Location:  [facility] OR;  Service: Plastics;  Laterality: Right;    INCISION AND DRAINAGE OF WOUND Right 07/08/2021    Procedure: INCISION AND DRAINAGE WOUND RIGHT HIP;  Surgeon: James Huntley MD;  Location:  [facility] OR;  Service: Orthopedics;  Laterality: Right;    JOINT REPLACEMENT      KYPHOPLASTY WITH BIOPSY Bilateral 10/26/2021    Procedure: THOARCIC 12 KYPHOPLASTY WITH BIOPSY;  Surgeon: Bandar Shea MD;  Location:  [facility] OR;  Service: Neurosurgery;  Laterality: Bilateral;    LEG DEBRIDEMENT Right 09/14/2021    Procedure: DEBRIDEMENT OF RIGHT HIP WOUND, RIGHT GLUTEAL FASCIOCUTANEOUS ADVANCEMENT FLAP AND RIGHT TENSOR FASCIAL JESSICA FLAP;  Surgeon: Amadeo Turner MD;  Location:  [facility] OR;  Service: Plastics;  Laterality: Right;    LUMBAR DISCECTOMY Right 03/23/2021    Procedure: LUMBAR DISCECTOMY MICRO, Lumbar 1/2 right;  Surgeon: Bandar Shea MD;  Location:  [facility] OR;  Service: Neurosurgery;  Laterality: Right;    LUMBAR FUSION N/A 01/19/2018    Procedure: L3-4,L4-5 DECOMPRESSION, POSTERIOR SPINAL FUSION WITH INSTRUMENTATION;  Surgeon: Fortino Oropeza MD;  Location:  [facility] OR;  Service:     LUMBAR LAMINECTOMY WITH FUSION Left 01/17/2018    Procedure: LEFT L3-4 L4-5 LATERAL LUMBAR INTERBODY FUSION;  Surgeon: Fortino Oropeza MD;  Location:  [facility] OR;  Service:     MASS EXCISION Right 04/23/2024     Procedure: RIGHT BUTTOCK MASS EXCISION;  Surgeon: Moises Keyes MD;  Location:  [facility] OR;  Service: General;  Laterality: Right;    MYRINGOTOMY W/ TUBES  09/04/2014    TUBES NO LONGER IN PLACE    OTHER SURGICAL HISTORY      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 so hardware was removed and spacers were placed    REPLACEMENT TOTAL KNEE Right     URETEROSCOPY LASER LITHOTRIPSY WITH STENT INSERTION N/A 06/21/2024    Procedure: URETEROSCOPY LASER LITHOTRIPSY WITH STENT INSERTION LEFT;  Surgeon: Ky Plascencia MD;  Location:  [facility] OR;  Service: Urology;  Laterality: N/A;         OT ASSESSMENT FLOWSHEET (Last 12 Hours)       OT Evaluation and Treatment       Row Name 08/19/24 0907                   OT Time and Intention    Subjective Information complains of;weakness  -EC        Document Type evaluation  -EC        Mode of Treatment occupational therapy;co-treatment  -EC           General Information    Patient Profile Reviewed yes  -EC        Prior Level of Function dependent:;transfer;all household mobility;community mobility;mod assist:;ADL's  -EC        Equipment Currently Used at Home walker, rolling;shower chair;grab bar;wheelchair, motorized;lift device  -EC        Pertinent History of Current Functional Problem presents with fever & weakness, recurrent UTIs, chronic R gluteal wound w wound vac  -EC        Existing Precautions/Restrictions fall;oxygen therapy device and L/min;other (see comments)  2L, wound vac RLE  -EC        Barriers to Rehab medically complex;previous functional deficit;physical barrier  -EC           Living Environment    Current Living Arrangements home  -EC        People in Home other (see comments)  24/7 caregiver  -EC           Pain Assessment    Pain Intervention(s) Repositioned;Ambulation/increased activity  -EC           Pain Scale: FACES Pre/Post-Treatment    Pain: FACES Scale, Pretreatment 2-->hurts little bit  -EC        Posttreatment Pain Rating 2-->hurts little bit  -EC        Pain  Location - Side/Orientation Right  -EC        Pain Location generalized  -EC        Pain Location - abdomen;extremity  -EC           Cognition    Cognitive Status conversationally confused about recent stays & PLOF  -EC        Orientation Status (Cognition) oriented x 4  -EC           Range of Motion Comprehensive    Comment, General Range of Motion BUE ROM WFL  -EC           Strength Comprehensive (MMT)    Comment, General Manual Muscle Testing (MMT) Assessment BUE 4-/5  -EC           Sensory    Additional Documentation Sensory Assessment (Somatosensory) (Group)  -EC           Sensory Assessment (Somatosensory)    Sensory Assessment (Somatosensory) UE sensation intact  -EC           Activities of Daily Living    BADL Assessment/Intervention lower body dressing  -EC           Lower Body Dressing Assessment/Training    [place] Level (Lower Body Dressing) lower body dressing skills;dependent (less than 25% patient effort)  -EC        Position (Lower Body Dressing) edge of bed sitting  -EC           BADL Safety/Performance    Impairments, BADL Safety/Performance balance;endurance/activity tolerance;pain;strength;trunk/postural control  -EC           Bed Mobility    Bed Mobility supine-sit;sit-supine;rolling right;rolling left;scooting/bridging  -EC        Rolling Left [place] (Bed Mobility) moderate assist (50% patient effort);2 person assist  -EC        Rolling Right [place] (Bed Mobility) moderate assist (50% patient effort);2 person assist  -EC        Scooting/Bridging [place] (Bed Mobility) dependent (less than 25% patient effort);2 person assist  -EC        Supine-Sit [place] (Bed Mobility) moderate assist (50% patient effort);1 person assist  -EC        Sit-Supine [place] (Bed Mobility) moderate assist (50% patient effort);maximum assist (25% patient effort);2 person assist  -EC        Assistive Device (Bed Mobility) bed rails;draw sheet;head of bed elevated  -EC           Transfer  Assessment/Treatment    Transfers sit-stand transfer;stand-sit transfer  -EC           Sit-Stand Transfer    Sit-Stand [place] (Transfers) verbal cues;moderate assist (50% patient effort);maximum assist (25% patient effort);2 person assist  -EC        Assistive Device (Sit-Stand Transfers) walker, front-wheeled  -EC        Comment, (Sit-Stand Transfer) x3, on 4th attempt pt required maxA and was unable to fully stand  -EC           Stand-Sit Transfer    Stand-Sit [place] (Transfers) verbal cues;minimum assist (75% patient effort);2 person assist  -EC           Safety Issues, Functional Mobility    Safety Issues Affecting Function (Mobility) friction/shear risk;positioning of assistive device;safety precaution awareness;safety precautions follow-through/compliance  -EC        Impairments Affecting Function (Mobility) balance;endurance/activity tolerance;pain;postural/trunk control;strength  -EC           Balance    Balance Assessment sitting static balance;sitting dynamic balance;standing static balance;standing dynamic balance  -EC        Static Sitting Balance standby assist  -EC        Dynamic Sitting Balance standby assist  -EC        Position, Sitting Balance unsupported;supported;sitting edge of bed  -EC        Static Standing Balance moderate assist;2-person assist;verbal cues  -EC        Dynamic Standing Balance maximum assist;2-person assist  -EC        Position/Device Used, Standing Balance supported;walker, front-wheeled  -EC        Comment, Balance vcs for fully upright posture  -EC           Wound 04/23/24 0936 Right gluteal Incision    Wound - Properties Group Placement Date: 04/23/24  -EP Placement Time: 0936  -EP Present on Original Admission: N  -EP Side: Right  -EP Location: gluteal  -EP Primary Wound Type: Incision  -EP    Retired Wound - Properties Group Placement Date: 04/23/24  -EP Placement Time: 0936 -EP Present on Original Admission: N  -EP Side: Right  -EP Location: gluteal  -EP  Primary Wound Type: Incision  -EP    Retired Wound - Properties Group Date first assessed: 04/23/24  -EP Time first assessed: 0936  -EP Present on Original Admission: N  -EP Side: Right  -EP Location: gluteal  -EP Primary Wound Type: Incision  -EP       Plan of Care Review    Plan of Care Reviewed With patient;caregiver  -EC        Progress no change  -EC        Outcome Evaluation OT eval complete. Pt presents in fowlers with caregiver at bedside. Pt seems somewhat confused regarding recent PLOF but has experienced a fxnal decline in the last 2 weeks & has been dependent on her lift at home. Today she requires modA for sup>sit. Demos functional BUE ROM however generalized weakness. She stood x3 with modA x2 for short duration each time. On 4th attempt, pt required maxA x2 and was unable to stand fully d/t worsening fatigue. Returned to supine with mod-maxA. Pt demos decreased act cara, strength, ADL independence, & balance. Skilled OT indicated to address these deficits & reduce her fall risk. Anticipate she will d/c home w 24/7 assist & HH therapy.  -EC           Positioning and Restraints    Pre-Treatment Position in bed  -EC        Post Treatment Position bed  -EC        In Bed notified nsg;sitting;call light within reach;encouraged to call for assist;exit alarm on;with family/caregiver;side rails up x2  -EC           Therapy Assessment/Plan (OT)    Date of Referral to OT 08/17/24  -EC        Patient/Family Therapy Goal Statement (OT) to go home & be able to use her shower again  -EC        OT Diagnosis decreased ADLs  -EC        Rehab Potential (OT) good, to achieve stated therapy goals  -EC        Criteria for Skilled Therapeutic Interventions Met (OT) yes;meets criteria;skilled treatment is necessary  -EC        Therapy Frequency (OT) 5 times/wk  -EC        Predicted Duration of Therapy Intervention (OT) 10 days  -EC        Planned Therapy Interventions (OT) activity tolerance training;adaptive equipment  training;BADL retraining;functional balance retraining;occupation/activity based interventions;patient/caregiver education/training;ROM/therapeutic exercise;strengthening exercise;transfer/mobility retraining  -EC           OT Goals    Transfer Goal Selection (OT) transfer, OT goal 1  -EC        Bathing Goal Selection (OT) bathing, OT goal 1  -EC        Problem Specific Goal Selection (OT) problem specific goal 1, OT  -EC           Transfer Goal 1 (OT)    Activity/Assistive Device (Transfer Goal 1, OT) sit-to-stand/stand-to-sit;bed-to-chair/chair-to-bed;toilet;shower chair  -EC        [place]aker Level/Cues Needed (Transfer Goal 1, OT) moderate assist (50-74% patient effort)  -EC        Time Frame (Transfer Goal 1, OT) long term goal (LTG)  -EC        Progress/Outcome (Transfer Goal 1, OT) new goal  -EC           Bathing Goal 1 (OT)    Activity/Device (Bathing Goal 1, OT) bathing skills, all  -EC        [place]aker Level/Cues Needed (Bathing Goal 1, OT) minimum assist (75% or more patient effort)  -EC        Time Frame (Bathing Goal 1, OT) long term goal (LTG)  -EC        Progress/Outcomes (Bathing Goal 1, OT) new goal  -EC           Problem Specific Goal 1 (OT)    Problem Specific Goal 1 (OT) Pt will implement 1 pain 'q to reduce pain & increase functional performance.  -EC        Time Frame (Problem Specific Goal 1, OT) long term goal (LTG)  -EC        Progress/Outcome (Problem Specific Goal 1, OT) new goal  -EC                  User Key  (r) = Recorded By, (t) = Taken By, (c) = Cosigned By      Initials Name Effective Dates    EP Sonny Rivero, SARAH 01/22/24 -     EC Ashley Martino OTR/L 10/13/23 -                      Occupational Therapy Education       Title: PT OT SLP Therapies (In Progress)       Topic: Occupational Therapy (In Progress)       Point: ADL training (Done)       Description:   Instruct learner(s) on proper safety adaptation and remediation techniques during self care or  transfers.   Instruct in proper use of assistive devices.                  Learning Progress Summary             Patient Acceptance, E, VU by  at 8/19/2024 1144   Caregiver Acceptance, E, VU by EC at 8/19/2024 1144                         Point: Home exercise program (Not Started)       Description:   Instruct learner(s) on appropriate technique for monitoring, assisting and/or progressing therapeutic exercises/activities.                  Learner Progress:  Not documented in this visit.              Point: Precautions (Done)       Description:   Instruct learner(s) on prescribed precautions during self-care and functional transfers.                  Learning Progress Summary             Patient Acceptance, E, VU by  at 8/19/2024 1144   Caregiver Acceptance, E, VU by EC at 8/19/2024 1144                         Point: Body mechanics (Done)       Description:   Instruct learner(s) on proper positioning and spine alignment during self-care, functional mobility activities and/or exercises.                  Learning Progress Summary             Patient Acceptance, E, VU by  at 8/19/2024 1144   Caregiver Acceptance, E, VU by  at 8/19/2024 1144                                         User Key       Initials Effective Dates Name Provider Type Discipline     10/13/23 -  Ashley Martino, OTR/L Occupational Therapist OT                      OT Recommendation and Plan  Planned Therapy Interventions (OT): activity tolerance training, adaptive equipment training, BADL retraining, functional balance retraining, occupation/activity based interventions, patient/caregiver education/training, ROM/therapeutic exercise, strengthening exercise, transfer/mobility retraining  Therapy Frequency (OT): 5 times/wk  Plan of Care Review  Plan of Care Reviewed With: patient, caregiver  Progress: no change  Outcome Evaluation: OT eval complete. Pt presents in fowlers with caregiver at bedside. Pt seems somewhat confused regarding recent  PLOF but has experienced a fxnal decline in the last 2 weeks & has been dependent on her lift at home. Today she requires modA for sup>sit. Demos functional BUE ROM however generalized weakness. She stood x3 with modA x2 for short duration each time. On 4th attempt, pt required maxA x2 and was unable to stand fully d/t worsening fatigue. Returned to supine with mod-maxA. Pt demos decreased act cara, strength, ADL independence, & balance. Skilled OT indicated to address these deficits & reduce her fall risk. Anticipate she will d/c home w 24/7 assist & HH therapy.  Plan of Care Reviewed With: patient, caregiver  Outcome Evaluation: OT cristofer complete. Pt presents in fowlers with caregiver at bedside. Pt seems somewhat confused regarding recent PLOF but has experienced a fxnal decline in the last 2 weeks & has been dependent on her lift at home. Today she requires modA for sup>sit. Demos functional BUE ROM however generalized weakness. She stood x3 with modA x2 for short duration each time. On 4th attempt, pt required maxA x2 and was unable to stand fully d/t worsening fatigue. Returned to supine with mod-maxA. Pt demos decreased act cara, strength, ADL independence, & balance. Skilled OT indicated to address these deficits & reduce her fall risk. Anticipate she will d/c home w 24/7 assist & HH therapy.     Outcome Measures       Row Name 08/19/24 1100             How much help from another is currently needed...    Putting on and taking off regular lower body clothing? 1  -EC      Bathing (including washing, rinsing, and drying) 2  -EC      Toileting (which includes using toilet bed pan or urinal) 1  -EC      Putting on and taking off regular upper body clothing 2  -EC      Taking care of personal grooming (such as brushing teeth) 3  -EC      Eating meals 3  -EC      AM-PAC 6 Clicks Score (OT) 12  -EC         Functional Assessment    Outcome Measure Options AM-PAC 6 Clicks Daily Activity (OT)  -EC                User Key   (r) = Recorded By, (t) = Taken By, (c) = Cosigned By      Initials Name Provider Type    EC Ashley Martino, OTR/L Occupational Therapist                    Time Calculation:    Time Calculation- OT       Row Name 08/19/24 0928             Time Calculation- OT    OT Start Time 0907  +10 min CR  -EC      OT Stop Time 0928  -EC      OT Time Calculation (min) 21 min  -EC      OT Received On 08/19/24  -EC      OT Goal Re-Cert Due Date 08/29/24  -EC         Untimed Charges    OT Eval/Re-eval Minutes 31  -EC         Total Minutes    Untimed Charges Total Minutes 31  -EC       Total Minutes 31  -EC                User Key  (r) = Recorded By, (t) = Taken By, (c) = Cosigned By      Initials Name Provider Type    EC Ashley Martino, OTR/L Occupational Therapist                  Therapy Charges for Today       Code Description Service Date Service Provider Modifiers Qty    02081144596 HC OT EVAL MOD COMPLEXITY 3 8/19/2024 Ashley Martino OTR/L GO 1                 Ashley Martino OTR/L  8/19/2024

## 2024-08-19 NOTE — TELEPHONE ENCOUNTER
Requested Prescriptions     Pending Prescriptions Disp Refills    folic acid (FOLVITE) 1 MG tablet [Pharmacy Med Name: FOLIC ACID 1 MG TABLET] 30 tablet 0     Sig: TAKE 1 TABLET BY MOUTH DAILY       Last Office Visit:  Visit date not found  Next Office Visit:  Visit date not found  Last Medication Refill:  7/26/2024 with 0 RF

## 2024-08-19 NOTE — HOME HEALTH
Routine Visit Note:Sitting in chair. Pleasantly forgetful    Skill/education provided:  in left ac for a BMP with a 21 g butterfly after cleansing site with alcohol. Bandaide applied. Lab taken to [facility]. Ed vac changed to right buttock. Wd pink. No s/s of infection    Patient/caregiver response: Cooperative    Other pertinent info: No s/s of covid

## 2024-08-19 NOTE — THERAPY RE-EVALUATION
Acute Care - Wound/Debridement Initial Evaluation   [place]     Patient Name: Tawny Shin  : 1955  MRN: [id number]  Today's Date: 2024         Referring Physician: Jennifer Saxena APRN      Admit Date: 2024    Visit Dx:    ICD-10-CM ICD-9-CM   1. Urinary tract infection, bacterial  N39.0 599.0    A49.9 041.9   2. Demand ischemia  I24.89 411.89   3. Esophageal dysphagia  R13.19 787.29   4. Impaired mobility [Z74.09]  Z74.09 799.89   5. Buttock wound, right, initial encounter [S31.819A]  S31.819A 877.0       Patient Active Problem List   Diagnosis    T12 compression fracture, initial encounter    Chronic embolism and thrombosis of unspecified deep veins of left lower extremity    Chronic anticoagulation    Iron deficiency anemia    Osteoporosis    E coli bacteremia    Epidural hematoma    Pleural effusion, left    Functional neurological symptom disorder with weakness or paralysis    Class 1 obesity due to excess calories with serious comorbidity and body mass index (BMI) of 33.0 to 33.9 in adult    Rheumatoid arthritis involving multiple sites    Chronic heart failure with preserved ejection fraction    Venous insufficiency    Coronary artery disease involving native coronary artery of native heart without angina pectoris    Sick sinus syndrome    Essential hypertension    Pressure injury of skin of heel    Chronic pain    Anemia of chronic disease    Cholelithiasis    Pressure injury of skin of buttock    Near functional paraplegia    Skin cancer    Squamous cell carcinoma of back    Hematoma    Right-sided chest wall pain    Hyperlipidemia LDL goal <70    Malodorous urine    Stage 3b chronic kidney disease    Cyst of buttocks    Sepsis due to Escherichia coli without acute organ dysfunction    Generalized weakness    Paralysis    History of urinary retention    Bacteremia due to Enterobacter species    Positive blood culture, contaminant    Hypothyroidism    Abnormal CT of the abdomen  "   Presence of cardiac pacemaker    Esophageal dysphagia    Acute gout    PINKY (acute kidney injury)    E. 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    Elevated cholesterol     Eustachian tube dysfunction     Heart disease     Herpes simplex     History of transfusion     Hyperlipidemia     Hypertension     Hypothyroidism 12/23/2020    Intrinsic asthma 12/23/2020    Knee dislocation     Labral tear of right hip joint     Laryngitis sicca     Laryngitis, chronic     Left carotid bruit 03/09/2016    MI (myocardial infarction)     Myalgia due to statin 06/25/2019    Open wound of right hip 09/14/2021    Osteomyelitis of right femur 07/06/2021    Otorrhea     Pacemaker 11/17/2016    Primary osteoarthritis of left knee 12/23/2020    Psoriasis vulgaris 12/23/2020    S/P coronary artery stent placement 03/09/2016    Sensorineural hearing loss     Seropositive rheumatoid arthritis of multiple sites 12/27/2019    Overview:  -myochrysine '93-'96 -methotrexate '96--->11/98;r/s  restarted 2/99--> 8/14 (anemia) -sulfasalazine- not effective -penicillamine 6/98-->10/98; no effect -leflunomide 11/98--> - Humira '13-->didn't take - Enbrel 12/14-->3/15- no effect!   Last Assessment & Plan:  - \"aching all over\" because she had to be off her anti-rheumatic drugs for 2 weeks in preparation for her R knee surgery - he    Sick sinus syndrome 12/27/2019    Sjogren's disease     " Spondylolisthesis of lumbar region 01/17/2018    Syncope, recurrent 02/08/2021    Urinary tract infection     UTI (urinary tract infection) 04/19/2024    UTI (urinary tract infection)         Past Surgical History:   Procedure Laterality Date    A-V CARDIAC PACEMAKER INSERTION  2016    ATRIAL CARDIAC PACEMAKER INSERTION      CARDIAC CATHETERIZATION      CATARACT EXTRACTION      CERVICAL CORPECTOMY N/A 03/03/2021    Procedure: CERVICAL 6 CORPECTOMY WITH TITANIUM CAGE WITH NEURO MONITORING;  Surgeon: Bandar Shea MD;  Location: [facility] OR;  Service: Neurosurgery;  Laterality: N/A;    CHOLECYSTECTOMY WITH INTRAOPERATIVE CHOLANGIOGRAM N/A 07/03/2024    Procedure: CHOLECYSTECTOMY LAPAROSCOPIC WITH INTRAOPERATIVE CHOLANGIOGRAM;  Surgeon: Moises Keyes MD;  Location: [facility] OR;  Service: General;  Laterality: N/A;    COLONOSCOPY  11/08/2011    One fold in the ascending colon which showed ulcer otherwise normal exam    COLONOSCOPY  11/12/2004    Normal exam repeat in five years    CORONARY ANGIOPLASTY WITH STENT PLACEMENT      X 2; 2013 & 2014    CYSTOSCOPY BLADDER STONE LITHOTRIPSY      ENDOSCOPY  07/10/2014    Normal exam    ENDOSCOPY N/A 05/30/2024    Procedure: ESOPHAGOGASTRODUODENOSCOPY WITH ANESTHESIA;  Surgeon: Taiwo Sanchez MD;  Location: [facility] ENDOSCOPY;  Service: Gastroenterology;  Laterality: N/A;  preop; dysphagia  postop: balloon dilation  PCP Jesus Manuel jeff    FLAP LEG Right 09/14/2021    Procedure: RIGHT GLUTEAL FASCIOCUTANEOUS ADVANCEMENT FLAP AND RIGHT TENSOR FASCIAL JESSICA FLAP;  Surgeon: Amadeo Truner MD;  Location: [facility] OR;  Service: Plastics;  Laterality: Right;    GALLBLADDER SURGERY      HIP ABDUCTION TENOTOMY BILATERAL Right 01/14/2021    Procedure: RIGHT HIP GLUTEUS MEDLUS / MINIMUS REPAIR, POSSIBLE ACHILLES ALLOGRAFT;  Surgeon: Nino Carlson MD;  Location: [facility] OR;  Service: Orthopedics;  Laterality: Right;    INCISION AND DRAINAGE ABSCESS Right 06/04/2022    Procedure: INCISION  AND DRAINAGE ABSCESS right hip;  Surgeon: Magda Salcido MD;  Location:  [facility] OR;  Service: General;  Laterality: Right;    INCISION AND DRAINAGE ABSCESS Right 06/10/2022    Procedure: RIGHT HIP INCISION AND DRAINAGE. MD NEEDS 3L VANC IRRIGATION, EDNA, DAICANS, KERLEX ROLLS;  Surgeon: Amadeo Turner MD;  Location:  [facility] OR;  Service: Plastics;  Laterality: Right;    INCISION AND DRAINAGE HIP Right 02/09/2021    Procedure: HIP INCISION AND DRAINAGE;  Surgeon: Nino Carlson MD;  Location:  [facility] OR;  Service: Orthopedics;  Laterality: Right;    INCISION AND DRAINAGE LEG Right 10/24/2021    Procedure: INCISION AND DRAINAGE LOWER EXTREMITY;  Surgeon: Amadeo Turner MD;  Location:  [facility] OR;  Service: Plastics;  Laterality: Right;    INCISION AND DRAINAGE OF WOUND Right 07/08/2021    Procedure: INCISION AND DRAINAGE WOUND RIGHT HIP;  Surgeon: James Huntley MD;  Location:  [facility] OR;  Service: Orthopedics;  Laterality: Right;    JOINT REPLACEMENT      KYPHOPLASTY WITH BIOPSY Bilateral 10/26/2021    Procedure: THOARCIC 12 KYPHOPLASTY WITH BIOPSY;  Surgeon: Bandar Shea MD;  Location:  [facility] OR;  Service: Neurosurgery;  Laterality: Bilateral;    LEG DEBRIDEMENT Right 09/14/2021    Procedure: DEBRIDEMENT OF RIGHT HIP WOUND, RIGHT GLUTEAL FASCIOCUTANEOUS ADVANCEMENT FLAP AND RIGHT TENSOR FASCIAL JESSICA FLAP;  Surgeon: Amadeo Turner MD;  Location:  [facility] OR;  Service: Plastics;  Laterality: Right;    LUMBAR DISCECTOMY Right 03/23/2021    Procedure: LUMBAR DISCECTOMY MICRO, Lumbar 1/2 right;  Surgeon: Bandar Shea MD;  Location:  [facility] OR;  Service: Neurosurgery;  Laterality: Right;    LUMBAR FUSION N/A 01/19/2018    Procedure: L3-4,L4-5 DECOMPRESSION, POSTERIOR SPINAL FUSION WITH INSTRUMENTATION;  Surgeon: Fortino Oropeza MD;  Location:  [facility] OR;  Service:     LUMBAR LAMINECTOMY WITH FUSION Left 01/17/2018    Procedure: LEFT L3-4 L4-5 LATERAL LUMBAR INTERBODY FUSION;   Surgeon: Fortino Oropeza MD;  Location:  [facility] OR;  Service:     MASS EXCISION Right 04/23/2024    Procedure: RIGHT BUTTOCK MASS EXCISION;  Surgeon: Moises Keyes MD;  Location:  [facility] OR;  Service: General;  Laterality: Right;    MYRINGOTOMY W/ TUBES  09/04/2014    TUBES NO LONGER IN PLACE    OTHER SURGICAL HISTORY      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 so hardware was removed and spacers were placed    REPLACEMENT TOTAL KNEE Right     URETEROSCOPY LASER LITHOTRIPSY WITH STENT INSERTION N/A 06/21/2024    Procedure: URETEROSCOPY LASER LITHOTRIPSY WITH STENT INSERTION LEFT;  Surgeon: Ky Plascencia MD;  Location:  [facility] OR;  Service: Urology;  Laterality: N/A;           Wound 04/23/24 0936 Right gluteal Incision (Active)   Dressing Appearance intact;no drainage 08/19/24 1415   Closure Adhesive bandage 08/19/24 1415   Base blanchable;clean;moist;red;pink;yellow;granulating 08/19/24 1415   Red (%), Wound Tissue Color 95 08/19/24 1415   Yellow (%), Wound Tissue Color 5 08/19/24 1415   Periwound intact;blanchable;dry;moist;pink;redness 08/19/24 1415   Periwound Temperature warm;cool 08/19/24 1415   Periwound Skin Turgor soft 08/19/24 1415   Edges open;rolled/closed 08/19/24 1415   Wound Length (cm) 1.7 cm 08/19/24 1415   Wound Width (cm) 1.2 cm 08/19/24 1415   Wound Depth (cm) 1.7 cm 08/19/24 1415   Wound Surface Area (cm^2) 2.04 cm^2 08/19/24 1415   Wound Volume (cm^3) 3.468 cm^3 08/19/24 1415   Tunneling [Depth (cm)/Location] 3.1, 3 to 5 using clokc method 08/19/24 1415   Drainage Characteristics/Odor bleeding controlled;sanguineous 08/19/24 1415   Drainage Amount none;scant 08/19/24 1415   Care, Wound cleansed with;irrigated with;sterile normal saline;wound cleanser;barrier applied;negative pressure wound therapy;moist wound environment maintained;pressure dressing applied 08/19/24 1415   Dressing Care dressing applied;dressing changed;foam 08/19/24 1415   Periwound Care moisturizer applied;barrier film  applied;barrier ointment applied;cleansed with pH balanced cleanser;dry periwound area maintained;absorptive dressing applied 08/19/24 1415   Wound Output (mL) 0 08/19/24 1415       NPWT (Negative Pressure Wound Therapy) 08/19/24 1415 R posterolateral glute (Active)   Therapy Setting continuous therapy 08/19/24 1415   Dressing foam, black;transparent dressing 08/19/24 1415   Instillation normal saline 08/19/24 1415   Pressure Setting 125 mmHg 08/19/24 1415   Sponges Inserted 2 08/19/24 1415   Sponges Removed other (see comments) 08/19/24 1415   Finger sweep complete Yes 08/19/24 1415         WOUND DEBRIDEMENT                     PT Assessment (Last 12 Hours)       PT Evaluation and Treatment       Row Name 08/19/24 1415          General Information    Referring Physician Jennifer Saxena APRN  -     Prior Level of Function dependent:;wound care  from HH nursing and OP wound care 1x/week  -AJ       Row Name 08/19/24 1415          Health Promotion    Additional Documentation Wound (LDA)  -AJ       Row Name 08/19/24 1415          Wound 04/23/24 0936 Right gluteal Incision    Wound - Properties Group Placement Date: 04/23/24  -EP Placement Time: 0936  -EP Present on Original Admission: N  -EP Side: Right  -EP Location: gluteal  -EP Primary Wound Type: Incision  -EP    Wound Image --  uploaded from [facility] RN on 8/19/24 in am  -AJ     Dressing Appearance intact;no drainage  -AJ     Closure Adhesive bandage  -AJ     Base blanchable;clean;moist;red;pink;yellow;granulating  -AJ     Red (%), Wound Tissue Color 95  -AJ     Yellow (%), Wound Tissue Color 5  -AJ     Periwound intact;blanchable;dry;moist;pink;redness  -     Periwound Temperature warm;cool  -AJ     Periwound Skin Turgor soft  -AJ     Edges open;rolled/closed  -AJ     Wound Length (cm) 1.7 cm  -AJ     Wound Width (cm) 1.2 cm  -AJ     Wound Depth (cm) 1.7 cm  -AJ     Wound Surface Area (cm^2) 2.04 cm^2  -AJ     Wound Volume (cm^3) 3.468 cm^3  -AJ      Tunneling [Depth (cm)/Location] 3.1, 3 to 5 using clokc method  -AJ     Drainage Characteristics/Odor bleeding controlled;sanguineous  -AJ     Drainage Amount none;scant  -AJ     Care, Wound cleansed with;irrigated with;sterile normal saline;wound cleanser;barrier applied;negative pressure wound therapy;moist wound environment maintained;pressure dressing applied  -AJ     Dressing Care dressing applied;dressing changed;foam  1 black foam  -AJ     Periwound Care moisturizer applied;barrier film applied;barrier ointment applied;cleansed with pH balanced cleanser;dry periwound area maintained;absorptive dressing applied  -     Wound Output (mL) 0  -AJ     Retired Wound - Properties Group Placement Date: 04/23/24  -EP Placement Time: 0936  -EP Present on Original Admission: N  -EP Side: Right  -EP Location: gluteal  -EP Primary Wound Type: Incision  -EP    Retired Wound - Properties Group Date first assessed: 04/23/24  -EP Time first assessed: 0936  -EP Present on Original Admission: N  -EP Side: Right  -EP Location: gluteal  -EP Primary Wound Type: Incision  -EP      Row Name             Wound 08/19/24 1415 Right lateral gluteal    Wound - Properties Group Placement Date: 08/19/24  -AJ Placement Time: 1415 -AJ, 1415  Side: Right  -AJ Orientation: lateral  -AJ, more posteriolateral from lateral incision  Location: gluteal  -AJ    Retired Wound - Properties Group Placement Date: 08/19/24  -AJ Placement Time: 1415 -AJ, 1415  Side: Right  -AJ Orientation: lateral  -AJ, more posteriolateral from lateral incision  Location: gluteal  -AJ    Retired Wound - Properties Group Date first assessed: 08/19/24  -AJ Time first assessed: 1415 -AJ, 1415  Side: Right  -AJ Location: gluteal  -AJ      Row Name 08/19/24 1415          NPWT (Negative Pressure Wound Therapy) 08/19/24 1415 R posterolateral glute    NPWT (Negative Pressure Wound Therapy) - Properties Group Placement Date: 08/19/24  -AJ Placement Time: 1415 -AJ Location: R  posterolateral glute  -AJ    Therapy Setting continuous therapy  -AJ     Dressing foam, black;transparent dressing  -AJ     Instillation normal saline  -AJ     Pressure Setting 125 mmHg  -AJ     Sponges Inserted 2  1 internal, tracpad external  -AJ     Sponges Removed other (see comments)  1 gauze removed that was packed into wound bed  -AJ     Finger sweep complete Yes  -AJ     Retired NPWT (Negative Pressure Wound Therapy) - Properties Group Placement Date: 08/19/24  -AJ Placement Time: 1415 -AJ Location: R posterolateral glute  -AJ    Retired NPWT (Negative Pressure Wound Therapy) - Properties Group Placement Date: 08/19/24  - Placement Time: 1415 -AJ Location: R posterolateral glute  -AJ      Row Name 08/19/24 1415          Plan of Care Review    Plan of Care Reviewed With patient;caregiver  -     Progress no change  -     Outcome Evaluation PT wound care re-eval complete. Pt presents with R posterolateral wound secondary to non healing surgical wound. Today pt agreeable to session and placement of wound vac. PT began by rolling pt to L side to expose wound. Initially the wound bed had woven gauze packed into and covered with pink pressure protective barrier. PT removed gauze with saline and prepped periwound area. The wound is measured at 1.7 cm x 1.2 x 1.7 cm. There also was tunnel noted from 3 to 5 using clock method that measured 3.1 cm. No foul odor or drainage present. One black foam was cut and placed into wound bed, covered with transparent covering and trackpad and NPWT intiated. Pt and caregiver educated on abnormal alarmings, excessive drainage and when the wound vac would be checked and changed.  -       Row Name 08/19/24 1419          Positioning and Restraints    Pre-Treatment Position in bed  -     Post Treatment Position bed  -AJ     In Bed notified nsg;side lying left;call light within reach;encouraged to call for assist;with family/caregiver  -       Row Name 08/19/24 1411           Therapy Assessment/Plan (PT)    Rehab Potential (PT) fair, will monitor progress closely  -AJ     Therapy Frequency (PT) other (see comments)  check daily and change M,W,F  -       Row Name 08/19/24 1415          PT Evaluation Complexity    Clinical Decision Making (PT Evaluation Complexity) low complexity  -       Row Name 08/19/24 1415          Physical Therapy Goals    Wound Care Goal Selection (PT) wound care, PT goal 1;wound care, PT goal 2;wound care, PT goal 3  -       Row Name 08/19/24 1415          Wound Care Goal 1 (PT)    Wound Care Goal 1 (PT) Pt will demo decreased wound length from 1.7 to 1 cm as needed for improvement  -AJ     Time Frame (Wound Care Goal 1, PT) long term goal (LTG);10 days  -AJ     Progress/Outcome (Wound Care Goal 1, PT) new goal  -       Row Name 08/19/24 1415          Wound Care Goal 2 (PT)    Wound Care Goal 2 (PT) Pt will have decreased depth of wound from 1.7 cm to 1 cm  -AJ     Time Frame (Wound Care Goal 2, PT) long term goal (LTG);10 days  -AJ     Progress/Outcome (Wound Care Goal 2, PT) new goal  -       Row Name 08/19/24 1415          Patient Education Goal (PT)    Activity (Patient Education Goal, PT) Pt and caregiver will report any abnormal alarms or change in excessive drainage in R glute wound bed to nursing staff as needed for possible wound vac exchange  -AJ     [place]/Cues/Accuracy (Memory Goal 2, PT) demonstrates adequately;independent;verbalizes understanding  -AJ     Time Frame (Patient Education Goal, PT) long term goal (LTG);10 days  -AJ     Progress/Outcome (Patient Education Goal, PT) new goal  -               User Key  (r) = Recorded By, (t) = Taken By, (c) = Cosigned By      Initials Name Provider Type    Sonny Thompson, RN Registered Nurse    Marco House, PT DPT Physical Therapist                  Physical Therapy Education       Title: PT OT SLP Therapies (In Progress)       Topic: Physical Therapy (Done)       Point: Mobility  training (Done)       Learning Progress Summary             Patient Acceptance, E, VU by INDIRA at 8/19/2024 1626    Comment: NPWT, call for any alarm, change every M,W,F, excessive drainage and 2 hour will cause wound vac exchange    Acceptance, E, VU by AJ at 8/19/2024 0949    Comment: role of PT, continued care, safe transfers   Caregiver Acceptance, E, VU by INDIRA at 8/19/2024 1626    Comment: NPWT, call for any alarm, change every M,W,F, excessive drainage and 2 hour will cause wound vac exchange                         Point: Home exercise program (Done)       Learning Progress Summary             Patient Acceptance, E, VU by INDIRA at 8/19/2024 1626    Comment: NPWT, call for any alarm, change every M,W,F, excessive drainage and 2 hour will cause wound vac exchange    Acceptance, E, VU by AJ at 8/19/2024 0949    Comment: role of PT, continued care, safe transfers   Caregiver Acceptance, E, VU by INDIRA at 8/19/2024 1626    Comment: NPWT, call for any alarm, change every M,W,F, excessive drainage and 2 hour will cause wound vac exchange                         Point: Body mechanics (Done)       Learning Progress Summary             Patient Acceptance, E, VU by INDIRA at 8/19/2024 1626    Comment: NPWT, call for any alarm, change every M,W,F, excessive drainage and 2 hour will cause wound vac exchange    Acceptance, E, VU by AJ at 8/19/2024 0949    Comment: role of PT, continued care, safe transfers   Caregiver Acceptance, E, VU by INDIRA at 8/19/2024 1626    Comment: NPWT, call for any alarm, change every M,W,F, excessive drainage and 2 hour will cause wound vac exchange                         Point: Precautions (Done)       Learning Progress Summary             Patient Acceptance, E, VU by INDIRA at 8/19/2024 1626    Comment: NPWT, call for any alarm, change every M,W,F, excessive drainage and 2 hour will cause wound vac exchange    Acceptance, E, VU by INDIRA at 8/19/2024 0949    Comment: role of PT, continued care, safe transfers    Caregiver Acceptance, E, VU by INDIRA at 8/19/2024 6998    Comment: NPWT, call for any alarm, change every M,W,F, excessive drainage and 2 hour will cause wound vac exchange                                         User Key       Initials Effective Dates Name Provider Type Discipline    INDIRA 08/15/24 -  Marco Perez, PT DPT Physical Therapist PT                    Recommendation and Plan  Anticipated Discharge Disposition (PT): home with 24/7 care, home with home health  Planned Therapy Interventions (PT): balance training, bed mobility training, gait training, home exercise program, patient/family education, transfer training, strengthening, postural re-education, ROM (range of motion), wheelchair management/propulsion training  Therapy Frequency (PT): other (see comments) (check daily and change M,W,F)  Plan of Care Reviewed With: patient, caregiver   Progress: no change       Progress: no change  Outcome Evaluation: PT wound care re-eval complete. Pt presents with R posterolateral wound secondary to non healing surgical wound. Today pt agreeable to session and placement of wound vac. PT began by rolling pt to L side to expose wound. Initially the wound bed had woven gauze packed into and covered with pink pressure protective barrier. PT removed gauze with saline and prepped periwound area. The wound is measured at 1.7 cm x 1.2 x 1.7 cm. There also was tunnel noted from 3 to 5 using clock method that measured 3.1 cm. No foul odor or drainage present. One black foam was cut and placed into wound bed, covered with transparent covering and trackpad and NPWT intiated. Pt and caregiver educated on abnormal alarmings, excessive drainage and when the wound vac would be checked and changed.  Plan of Care Reviewed With: patient, caregiver       Outcome Measures       Row Name 08/19/24 1100             How much help from another is currently needed...    Putting on and taking off regular lower body clothing? 1  -EC      Bathing  (including washing, rinsing, and drying) 2  -EC      Toileting (which includes using toilet bed pan or urinal) 1  -EC      Putting on and taking off regular upper body clothing 2  -EC      Taking care of personal grooming (such as brushing teeth) 3  -EC      Eating meals 3  -EC      AM-PAC 6 Clicks Score (OT) 12  -EC         Functional Assessment    Outcome Measure Options AM-PAC 6 Clicks Daily Activity (OT)  -EC                User Key  (r) = Recorded By, (t) = Taken By, (c) = Cosigned By      Initials Name Provider Type    EC Ashley Martino, OTR/L Occupational Therapist                      Time Calculation   PT Charges       Row Name 08/19/24 1415 08/19/24 0908          Time Calculation    Start Time 1415  -AJ 0907  -AJ     Stop Time 1516  -AJ 0931  +15 for chart review and communication  -     Time Calculation (min) 61 min  -AJ 24 min  -AJ     PT Received On -- 08/19/24  -AJ     PT Goal Re-Cert Due Date -- 08/29/24  -AJ        Untimed Charges    PT Eval/Re-eval Minutes 24  -AJ 39  -AJ     Wound Care 13012 Neg Press (DME) wound TO 50 sqcm  -AJ --     66819-Aya Pressure wound to 50 sqcm 37  -AJ --        Total Minutes    Untimed Charges Total Minutes 61  -AJ 39  -AJ      Total Minutes 61  -AJ 39  -AJ               User Key  (r) = Recorded By, (t) = Taken By, (c) = Cosigned By      Initials Name Provider Type    Marco House, PT DPT Physical Therapist                      Therapy Charges for Today       Code Description Service Date Service Provider Modifiers Qty    69590663872 HC PT EVAL MOD COMPLEXITY 3 8/19/2024 Marco Perez, PT DPT GP 1    12630942844 HC PT RE-EVAL ESTABLISHED PLAN 2 8/19/2024 Marco Perez, PT DPT GP 1    09662079589 HC PT NEG PRESS WOUND TO 50SQCM DME2 8/19/2024 Marco Perez, PT DPT GP 1              PT G-Codes  Outcome Measure Options: AM-PAC 6 Clicks Daily Activity (OT)  AM-PAC 6 Clicks Score (PT): 9  AM-PAC 6 Clicks Score (OT): 12       YESI WebbT  8/19/2024

## 2024-08-19 NOTE — PLAN OF CARE
Goal Outcome Evaluation:  Plan of Care Reviewed With: patient, caregiver     Progress: improving      Treatment Assessment (SLP): improved (08/19/24 0835)  Treatment Assessment Comments (SLP): see note (08/19/24 0835)  Plan for Continued Treatment (SLP): continue treatment per plan of care (08/19/24 0835)    Patient seen to address dysphagia. Patient denied pain. Patient seen over a portion of breakfast. Patient exhibited no difficulty swallowing. No report of esophageal symptoms. No s/s aspiration. Patient consumed medications with thin liquids. No difficulty swallowing. No report of esophageal symptoms. No s/s aspiration. Patient reported she has not displayed regurgitation, vomiting, or nausea in a few days. During a med review with patient RN, it was noted the patient is on protonix, which according to her report is a new medication. ST reviewed the medication use with patient and the patient expressed she was glad something stopped the feeling of nausea and regurgitation she had been feeling. ST reviewed reflux precautions such as avoiding eating within 2 hours of reclining for sleep and remaining at least 30 degree HOB elevation while sleeping. Patient and sitter expressed understanding. Continue with current diet of regular consistency foods and thin liquids. ST will follow up x1 session to assure safe diet intake.     Skye Carvalho, SLP 8/19/2024 10:42 CDT

## 2024-08-19 NOTE — CONSULTS
[facility]  INPATIENT WOUND & OSTOMY CONSULTATION    Today's Date: 08/19/24    Patient Name: Tawny Shin  MRN: [id number]  CSN: [id number]  PCP: Moises Oliveira MD  Referring Provider:   Consulting Provider (From admission, onward)      Start Ordered     Status Ordering Provider    08/17/24 0310 08/17/24 0310  Inpatient Wound Care MD Consult  Once        Specialty:  Wound Care  Provider:  Polly Senior APRN Acknowledged JESSEE, ALI JANELL           Attending Provider: Nayan Hale DO  Length of Stay: 1    SUBJECTIVE   Chief Complaint: Wound VAC    HPI: Tawny Shin, a 69 y.o.female, presents with a past medical history of gout, esophageal dysphagia, hypothyroidism, stage IIIb CKD, near functional paraplegia, HTN, SSS, CAD, venous insufficiency, RA involving multiple sites, CHF with preserved ejection fraction, anemia, chronic anticoagulation (Eliquis), chronic embolism and thrombosis of unspecified deep veins of left lower extremity.  Denies smoking.  Nondiabetic.  A full past medical history is listed below.  Inpatient wound care consulted due to wound VAC that is in place.  Patient is well-known to me in the outpatient wound clinic.  Initially, patient had a right buttock mass excision on 4/23/2024 with Dr. Keyes.  The patient did follow-up with Dr. Keyes on 8/1 who stated that the wound was doing well and that she only needed to follow-up as needed with him.  She does utilize an electric wheelchair.  She has 24/7 care at home.  There is a caregiver at the bedside.  She does answer my questions appropriately.  The wound VAC was removed by myself and the wound care educator.  A saline wet-to-dry dressing was placed to the wound until physical therapy can evaluate the patient and apply wound VAC.      Visit Dx:    ICD-10-CM ICD-9-CM   1. Urinary tract infection, bacterial  N39.0 599.0    A49.9 041.9   2. Demand ischemia  I24.89 411.89   3. Esophageal dysphagia  R13.19  787.29   4. Impaired mobility [Z74.09]  Z74.09 799.89       Hospital Problem List:     E. coli UTI (urinary tract infection)    Chronic embolism and thrombosis of unspecified deep veins of left lower extremity    Chronic anticoagulation    Iron deficiency anemia    Chronic heart failure with preserved ejection fraction    Sick sinus syndrome    Anemia of chronic disease    Positive blood culture, contaminant    PINKY (acute kidney injury)      History:   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    Elevated cholesterol     Eustachian tube dysfunction     Heart disease     Herpes simplex     History of transfusion     Hyperlipidemia     Hypertension     Hypothyroidism 12/23/2020    Intrinsic asthma 12/23/2020    Knee dislocation     Labral tear of right hip joint     Laryngitis sicca     Laryngitis, chronic     Left carotid bruit 03/09/2016    MI (myocardial infarction)     Myalgia due to statin 06/25/2019    Open wound of right hip 09/14/2021    Osteomyelitis of right femur 07/06/2021    Otorrhea     Pacemaker 11/17/2016    Primary osteoarthritis of left knee 12/23/2020    Psoriasis vulgaris 12/23/2020    S/P coronary artery stent placement 03/09/2016    Sensorineural hearing loss     Seropositive rheumatoid arthritis of multiple sites 12/27/2019    Overview:  -myochrysine '93-'96 -methotrexate '96--->11/98;r/s  restarted 2/99--> 8/14 (anemia) -sulfasalazine- not effective -penicillamine 6/98-->10/98;  "no effect -leflunomide 11/98--> - Humira '13-->didn't take - Enbrel 12/14-->3/15- no effect!   Last Assessment & Plan:  - \"aching all over\" because she had to be off her anti-rheumatic drugs for 2 weeks in preparation for her R knee surgery - he    Sick sinus syndrome 12/27/2019    Sjogren's disease     Spondylolisthesis of lumbar region 01/17/2018    Syncope, recurrent 02/08/2021    Urinary tract infection     UTI (urinary tract infection) 04/19/2024    UTI (urinary tract infection)      Past Surgical History:   Procedure Laterality Date    A-V CARDIAC PACEMAKER INSERTION  2016    ATRIAL CARDIAC PACEMAKER INSERTION      CARDIAC CATHETERIZATION      CATARACT EXTRACTION      CERVICAL CORPECTOMY N/A 03/03/2021    Procedure: CERVICAL 6 CORPECTOMY WITH TITANIUM CAGE WITH NEURO MONITORING;  Surgeon: Bandar Shea MD;  Location: [facility] OR;  Service: Neurosurgery;  Laterality: N/A;    CHOLECYSTECTOMY WITH INTRAOPERATIVE CHOLANGIOGRAM N/A 07/03/2024    Procedure: CHOLECYSTECTOMY LAPAROSCOPIC WITH INTRAOPERATIVE CHOLANGIOGRAM;  Surgeon: Moises Keyes MD;  Location: [facility] OR;  Service: General;  Laterality: N/A;    COLONOSCOPY  11/08/2011    One fold in the ascending colon which showed ulcer otherwise normal exam    COLONOSCOPY  11/12/2004    Normal exam repeat in five years    CORONARY ANGIOPLASTY WITH STENT PLACEMENT      X 2; 2013 & 2014    CYSTOSCOPY BLADDER STONE LITHOTRIPSY      ENDOSCOPY  07/10/2014    Normal exam    ENDOSCOPY N/A 05/30/2024    Procedure: ESOPHAGOGASTRODUODENOSCOPY WITH ANESTHESIA;  Surgeon: Taiwo Sanchez MD;  Location: [facility] ENDOSCOPY;  Service: Gastroenterology;  Laterality: N/A;  preop; dysphagia  postop: balloon dilation  PCP Jesus Manuel jeff    FLAP LEG Right 09/14/2021    Procedure: RIGHT GLUTEAL FASCIOCUTANEOUS ADVANCEMENT FLAP AND RIGHT TENSOR FASCIAL JESSICA FLAP;  Surgeon: Amadeo Turner MD;  Location: [facility] OR;  Service: Plastics;  Laterality: Right;    GALLBLADDER SURGERY      " HIP ABDUCTION TENOTOMY BILATERAL Right 01/14/2021    Procedure: RIGHT HIP GLUTEUS MEDLUS / MINIMUS REPAIR, POSSIBLE ACHILLES ALLOGRAFT;  Surgeon: Nino Carlson MD;  Location:  [facility] OR;  Service: Orthopedics;  Laterality: Right;    INCISION AND DRAINAGE ABSCESS Right 06/04/2022    Procedure: INCISION AND DRAINAGE ABSCESS right hip;  Surgeon: Magda Salcido MD;  Location:  [facility] OR;  Service: General;  Laterality: Right;    INCISION AND DRAINAGE ABSCESS Right 06/10/2022    Procedure: RIGHT HIP INCISION AND DRAINAGE. MD NEEDS 3L VANC IRRIGATION, CURRETTES, DAICANS, KERLEX ROLLS;  Surgeon: Amadeo Turner MD;  Location:  [facility] OR;  Service: Plastics;  Laterality: Right;    INCISION AND DRAINAGE HIP Right 02/09/2021    Procedure: HIP INCISION AND DRAINAGE;  Surgeon: Nino Carlson MD;  Location:  [facility] OR;  Service: Orthopedics;  Laterality: Right;    INCISION AND DRAINAGE LEG Right 10/24/2021    Procedure: INCISION AND DRAINAGE LOWER EXTREMITY;  Surgeon: Amadeo Turner MD;  Location:  [facility] OR;  Service: Plastics;  Laterality: Right;    INCISION AND DRAINAGE OF WOUND Right 07/08/2021    Procedure: INCISION AND DRAINAGE WOUND RIGHT HIP;  Surgeon: James Huntley MD;  Location:  [facility] OR;  Service: Orthopedics;  Laterality: Right;    JOINT REPLACEMENT      KYPHOPLASTY WITH BIOPSY Bilateral 10/26/2021    Procedure: THOARCIC 12 KYPHOPLASTY WITH BIOPSY;  Surgeon: Bandar Shea MD;  Location:  [facility] OR;  Service: Neurosurgery;  Laterality: Bilateral;    LEG DEBRIDEMENT Right 09/14/2021    Procedure: DEBRIDEMENT OF RIGHT HIP WOUND, RIGHT GLUTEAL FASCIOCUTANEOUS ADVANCEMENT FLAP AND RIGHT TENSOR FASCIAL JESSICA FLAP;  Surgeon: Amadeo Turner MD;  Location:  [facility] OR;  Service: Plastics;  Laterality: Right;    LUMBAR DISCECTOMY Right 03/23/2021    Procedure: LUMBAR DISCECTOMY MICRO, Lumbar 1/2 right;  Surgeon: Bandar Shea MD;  Location:  [facility] OR;  Service: Neurosurgery;  Laterality: Right;     LUMBAR FUSION N/A 01/19/2018    Procedure: L3-4,L4-5 DECOMPRESSION, POSTERIOR SPINAL FUSION WITH INSTRUMENTATION;  Surgeon: Fortino Oropeza MD;  Location:  [facility] OR;  Service:     LUMBAR LAMINECTOMY WITH FUSION Left 01/17/2018    Procedure: LEFT L3-4 L4-5 LATERAL LUMBAR INTERBODY FUSION;  Surgeon: Fortino Oropeza MD;  Location:  [facility] OR;  Service:     MASS EXCISION Right 04/23/2024    Procedure: RIGHT BUTTOCK MASS EXCISION;  Surgeon: Moises Keyes MD;  Location:  [facility] OR;  Service: General;  Laterality: Right;    MYRINGOTOMY W/ TUBES  09/04/2014    TUBES NO LONGER IN PLACE    OTHER SURGICAL HISTORY      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 so hardware was removed and spacers were placed    REPLACEMENT TOTAL KNEE Right     URETEROSCOPY LASER LITHOTRIPSY WITH STENT INSERTION N/A 06/21/2024    Procedure: URETEROSCOPY LASER LITHOTRIPSY WITH STENT INSERTION LEFT;  Surgeon: Ky Plascencia MD;  Location:  [facility] OR;  Service: Urology;  Laterality: N/A;     Social History     Socioeconomic History    Marital status:    Tobacco Use    Smoking status: Never     Passive exposure: Never    Smokeless tobacco: Never   Vaping Use    Vaping status: Never Used   Substance and Sexual Activity    Alcohol use: No    Drug use: Never    Sexual activity: Defer     Birth control/protection: Abstinence     Family History   Problem Relation Age of Onset    Cancer Mother         Lung cancer    Heart disease Father         Doied of heart. Attack       Allergies:  Allergies   Allergen Reactions    Atorvastatin Other (See Comments)     LEG CRAMPS      Amoxicillin Rash    Escitalopram Rash    Nabumetone Rash    Niacin Er Rash    Penicillin G Rash    Penicillins Rash    Simvastatin Rash       Medications:    Current Facility-Administered Medications:     acetaminophen (TYLENOL) tablet 650 mg, 650 mg, Oral, Q4H PRN, Yuli Hatch DO, 650 mg at 08/18/24 1156    allopurinol (ZYLOPRIM) tablet 100 mg, 100 mg, Oral, Daily, Holden  Yuli Atkins DO, 100 mg at 08/19/24 0851    apixaban (ELIQUIS) tablet 5 mg, 5 mg, Oral, BID, Yuli Hatch DO, 5 mg at 08/19/24 0852    aspirin chewable tablet 324 mg, 324 mg, Oral, Once, Gee Stahl MD    sennosides-docusate (PERICOLACE) 8.6-50 MG per tablet 2 tablet, 2 tablet, Oral, BID PRN **AND** polyethylene glycol (MIRALAX) packet 17 g, 17 g, Oral, Daily PRN **AND** bisacodyl (DULCOLAX) EC tablet 5 mg, 5 mg, Oral, Daily PRN **AND** bisacodyl (DULCOLAX) suppository 10 mg, 10 mg, Rectal, Daily PRN, Yuli Hatch DO    carvedilol (COREG) tablet 25 mg, 25 mg, Oral, BID With Meals, Yuli Hatch DO, 25 mg at 08/19/24 0852    cefTRIAXone (ROCEPHIN) 1,000 mg in sodium chloride 0.9 % 100 mL MBP, 1,000 mg, Intravenous, Q24H, Nayan Hale DO, Last Rate: 200 mL/hr at 08/19/24 1337, 1,000 mg at 08/19/24 1337    DULoxetine (CYMBALTA) DR capsule 60 mg, 60 mg, Oral, Daily, Yuli Hatch DO, 60 mg at 08/19/24 0852    isosorbide mononitrate (IMDUR) 24 hr tablet 60 mg, 60 mg, Oral, Daily, Yuli Hatch DO, 60 mg at 08/19/24 0851    lactated ringers infusion, 100 mL/hr, Intravenous, Continuous, Butch Chong MD, Last Rate: 100 mL/hr at 08/19/24 1432, 100 mL/hr at 08/19/24 1432    leflunomide (ARAVA) tablet 20 mg, 20 mg, Oral, Daily, Yuli Hatch DO, 20 mg at 08/19/24 0852    levothyroxine (SYNTHROID, LEVOTHROID) tablet 75 mcg, 75 mcg, Oral, Q AM, Yuli Hatch DO, 75 mcg at 08/19/24 0502    [Held by provider] losartan (COZAAR) tablet 50 mg, 50 mg, Oral, Daily, Yuli Hatch DO, 50 mg at 08/17/24 0814    nystatin (MYCOSTATIN) 100,000 unit/mL suspension 500,000 Units, 5 mL, Swish & Spit, 4x Daily, Yuli Hatch DO, 500,000 Units at 08/19/24 1337    ondansetron (ZOFRAN) injection 4 mg, 4 mg, Intravenous, Q6H PRN, Yuli Hatch DO, 4 mg at 08/17/24 0913    oxybutynin XL (DITROPAN-XL) 24 hr tablet 10 mg, 10 mg, Oral, Daily, Yuli Hatch DO, 10 mg at  08/19/24 0851    pantoprazole (PROTONIX) EC tablet 40 mg, 40 mg, Oral, Daily, Yuli Hatch DO, 40 mg at 08/19/24 0852    sodium chloride 0.9 % flush 10 mL, 10 mL, Intravenous, PRN, Gee Stahl MD    sodium chloride 0.9 % flush 10 mL, 10 mL, Intravenous, Q12H, Yuli Hatch DO, 10 mL at 08/19/24 0909    sodium chloride 0.9 % flush 10 mL, 10 mL, Intravenous, PRN, Yuli Hatch DO    sodium chloride 0.9 % infusion 40 mL, 40 mL, Intravenous, PRN, Yuli Hatch DO    [Held by provider] spironolactone (ALDACTONE) tablet 25 mg, 25 mg, Oral, Daily, Yuli Hatch DO, 25 mg at 08/17/24 0813    OBJECTIVE     Vitals:    08/19/24 1100   BP: 118/48   Pulse: 72   Resp: 18   Temp: 98.2 °F (36.8 °C)   SpO2: 98%       PHYSICAL EXAM: Wound to right gluteus is to fat layer.  There is healthy granular tissue noted to the wound bed.  No purulence or malodor noted.  No drainage.  Periwound area is without erythema or edema.  Physical Exam       Results Review:  Lab Results (last 48 hours)       Procedure Component Value Units Date/Time    Urine Culture - Urine, Urine, Catheter [[id number]]  (Abnormal)  (Susceptibility) Collected: 08/17/24 0334    Specimen: Urine, Catheter Updated: 08/19/24 0923     Urine Culture >100,000 CFU/mL Escherichia coli    Narrative:      Colonization of the urinary tract without infection is common. Treatment is discouraged unless the patient is symptomatic, pregnant, or undergoing an invasive urologic procedure.    Susceptibility        Escherichia coli      LICHA      Amikacin Susceptible      Amoxicillin + Clavulanate Susceptible      Ampicillin Resistant      Ampicillin + Sulbactam Intermediate      Cefazolin Susceptible      Cefepime Susceptible      Ceftazidime Susceptible      Ceftriaxone Susceptible      Gentamicin Resistant      Levofloxacin Resistant      Nitrofurantoin Susceptible      Piperacillin + Tazobactam Susceptible      Tobramycin Intermediate      Trimethoprim +  Sulfamethoxazole Susceptible                           Blood Culture With BREONNA - Blood, Arm, Right [[id number]]  (Abnormal) Collected: 08/18/24 1110    Specimen: Blood from Arm, Right Updated: 08/19/24 0904     Blood Culture Abnormal Stain     Gram Stain Aerobic Bottle Gram positive cocci    CBC & Differential [[id number]]  (Abnormal) Collected: 08/19/24 0422    Specimen: Blood Updated: 08/19/24 0607    Narrative:      The following orders were created for panel order CBC & Differential.  Procedure                               Abnormality         Status                     ---------                               -----------         ------                     CBC Auto Differential[[id number]]        Abnormal            Final result                 Please view results for these tests on the individual orders.    CBC Auto Differential [[id number]]  (Abnormal) Collected: 08/19/24 0422    Specimen: Blood Updated: 08/19/24 0607     WBC 4.50 10*3/mm3      RBC 2.49 10*6/mm3      Hemoglobin 7.1 g/dL      Hematocrit 23.2 %      MCV 93.2 fL      MCH 28.5 pg      MCHC 30.6 g/dL      RDW 21.2 %      RDW-SD 68.3 fl      MPV --     Comment: Unable to calculate        Platelets 97 10*3/mm3      Neutrophil % 40.6 %      Lymphocyte % 30.0 %      Monocyte % 18.9 %      Eosinophil % 8.0 %      Basophil % 0.9 %      Immature Grans % 1.6 %      Neutrophils, Absolute 1.83 10*3/mm3      Lymphocytes, Absolute 1.35 10*3/mm3      Monocytes, Absolute 0.85 10*3/mm3      Eosinophils, Absolute 0.36 10*3/mm3      Basophils, Absolute 0.04 10*3/mm3      Immature Grans, Absolute 0.07 10*3/mm3     Blood Culture - Blood, Arm, Left [[id number]]  (Abnormal) Collected: 08/16/24 2050    Specimen: Blood from Arm, Left Updated: 08/19/24 0530     Blood Culture Staphylococcus, coagulase negative     Isolated from Aerobic Bottle     Gram Stain Aerobic Bottle Gram positive cocci    Narrative:      Probable contaminant requires clinical correlation, susceptibility not  performed unless requested by physician.    Basic Metabolic Panel [[id number]]  (Abnormal) Collected: 08/19/24 0422    Specimen: Blood Updated: 08/19/24 0454     Glucose 80 mg/dL      BUN 25 mg/dL      Creatinine 1.50 mg/dL      Sodium 140 mmol/L      Potassium 3.8 mmol/L      Chloride 109 mmol/L      CO2 21.0 mmol/L      Calcium 8.8 mg/dL      BUN/Creatinine Ratio 16.7     Anion Gap 10.0 mmol/L      eGFR 37.1 mL/min/1.73     Narrative:      GFR Normal >60  Chronic Kidney Disease <60  Kidney Failure <15    The GFR formula is only valid for adults with stable renal function between ages 18 and 70.    Blood Culture - Blood, Hand, Right [[id number]]  (Normal) Collected: 08/16/24 2050    Specimen: Blood from Hand, Right Updated: 08/18/24 2115     Blood Culture No growth at 2 days    CBC & Differential [[id number]]  (Abnormal) Collected: 08/18/24 0419    Specimen: Blood Updated: 08/18/24 0535    Narrative:      The following orders were created for panel order CBC & Differential.  Procedure                               Abnormality         Status                     ---------                               -----------         ------                     CBC Auto Differential[[id number]]        Abnormal            Final result                 Please view results for these tests on the individual orders.    CBC Auto Differential [[id number]]  (Abnormal) Collected: 08/18/24 0419    Specimen: Blood Updated: 08/18/24 0535     WBC 4.06 10*3/mm3      RBC 2.58 10*6/mm3      Hemoglobin 7.3 g/dL      Hematocrit 24.0 %      MCV 93.0 fL      MCH 28.3 pg      MCHC 30.4 g/dL      RDW 21.0 %      RDW-SD 69.6 fl      MPV --     Comment: Unable to calculate        Platelets 90 10*3/mm3      Neutrophil % 48.0 %      Lymphocyte % 24.1 %      Monocyte % 21.2 %      Eosinophil % 3.7 %      Basophil % 1.0 %      Immature Grans % 2.0 %      Neutrophils, Absolute 1.95 10*3/mm3      Lymphocytes, Absolute 0.98 10*3/mm3      Monocytes, Absolute 0.86  10*3/mm3      Eosinophils, Absolute 0.15 10*3/mm3      Basophils, Absolute 0.04 10*3/mm3      Immature Grans, Absolute 0.08 10*3/mm3     Comprehensive Metabolic Panel [[id number]]  (Abnormal) Collected: 08/18/24 0419    Specimen: Blood Updated: 08/18/24 0509     Glucose 102 mg/dL      BUN 28 mg/dL      Creatinine 1.71 mg/dL      Sodium 140 mmol/L      Potassium 4.1 mmol/L      Comment: Slight hemolysis detected by analyzer. Result may be falsely elevated.        Chloride 109 mmol/L      CO2 21.0 mmol/L      Calcium 8.6 mg/dL      Total Protein 5.1 g/dL      Albumin 2.3 g/dL      ALT (SGPT) 23 U/L      AST (SGOT) 43 U/L      Alkaline Phosphatase 103 U/L      Total Bilirubin 0.4 mg/dL      Globulin 2.8 gm/dL      A/G Ratio 0.8 g/dL      BUN/Creatinine Ratio 16.4     Anion Gap 10.0 mmol/L      eGFR 31.7 mL/min/1.73     Narrative:      GFR Normal >60  Chronic Kidney Disease <60  Kidney Failure <15    The GFR formula is only valid for adults with stable renal function between ages 18 and 70.    Blood Culture ID, PCR - Blood, Arm, Left [[id number]]  (Abnormal) Collected: 08/16/24 2050    Specimen: Blood from Arm, Left Updated: 08/17/24 1724     BCID, PCR Staph spp, not aureus or lugdunensis. Identification by BCID2 PCR.     BOTTLE TYPE Aerobic Bottle          Imaging Results (Last 72 Hours)       Procedure Component Value Units Date/Time    CT Abdomen Pelvis Without Contrast [[id number]] Collected: 08/17/24 0727     Updated: 08/17/24 0739    Narrative:      EXAM: CT ABDOMEN PELVIS WO CONTRAST-     INDICATION: LLQ abdominal pain       TECHNIQUE: Helically acquired CT images were obtained of the abdomen and  pelvis without intravenous contrast. Coronal and sagittal reformations  were performed.       DOSE LENGTH PRODUCT: 1368.9 mGy.cm mGy cm. Automated exposure control  was also utilized to decrease patient radiation dose.     COMPARISON: 6/20/2024     FINDINGS:     Decreased activity due to motion     Lower Chest: Pacemaker  leads partially visualized. Coronary artery  calcifications and/or stents.     Liver: Unremarkable.     Biliary Tree: Prior cholecystectomy.     Spleen: Granulomatous calcifications.     Pancreas: Unremarkable.     Adrenal Glands: Unremarkable.     Kidneys/Ureters/Bladder: Renal vascular calcifications     Reproductive Organs: Calcified uterine fibroid.     Gastrointestinal Tract: Colonic diverticulosis.     Lymphatics: Unremarkable.     Vasculature: Moderate atherosclerosis.     Peritoneum/Retroperitoneum: Unremarkable.     Abdominal Wall/Soft Tissues: There is mild soft tissue irregularity and  underlying soft tissue stranding involving the right posterior pelvis  which is only partially visualized (series 2 image 66).     Osseous Structures: Lumbar spinal fusion hardware. Kyphoplasty changes  at T12. No acute or suspicious finding.       Impression:         No acute findings in the abdomen/pelvis.     Soft tissue irregularity and underlying edema in the right posterior  pelvis is only partially visualized. This may represent a soft tissue  wound.      These findings are in agreement with the critical and emergent findings  from the [facility] preliminary report.     This report was signed and finalized on 8/17/2024 7:36 AM by Marco Tovar.       XR Chest 1 View [[id number]] Collected: 08/16/24 2121     Updated: 08/16/24 2126    Narrative:      EXAMINATION:  XR CHEST 1 VW-  8/16/2024 7:48 PM     HISTORY: Fever. Hypertension.     COMPARISON: 6/12/2024 and 11/29/2014.     TECHNIQUE: Single view AP image.     FINDINGS: There is mild hypoventilation. There is vascular crowding.  There is a calcified granuloma in the right lung apex. There is a  calcified right suprahilar lymph node. There is stable bronchial wall  thickening. The heart is normal in size. There is a pacemaker on the  left. There are degenerative changes of the spine.          Impression:      1. Hypoventilation with vascular crowding.  2. Stable  bronchial wall thickening. Old granulomatous disease.           This report was signed and finalized on 8/16/2024 9:23 PM by Dr. Zurdo Vazquez MD.                  ASSESSMENT/PLAN       Examination and evaluation of wound(s) was performed.    DIAGNOSIS:   Encounter for surgical aftercare following surgery on the skin and subcutaneous tissue  Unspecified open wound of right buttock, subsequent encounter  Paraplegia, unspecified  E. coli UTI      PLAN:   Orders placed for wound care and pressure/moisture management as listed below.   See wound care orders.  Continue wound VAC to right gluteus at 125 mmHg with VAC changes on M W F.  Follow-up in the outpatient wound center.    Discussed findings and treatment plan including risks, benefits, and treatment options with Tawny Shin in detail. Patient agreed with treatment plan.      This document has been electronically signed by DANIELA Horton on 8/19/2024 15:10 CDT

## 2024-08-19 NOTE — PLAN OF CARE
Goal Outcome Evaluation:  Plan of Care Reviewed With: patient, caregiver        Progress: no change  Outcome Evaluation: PT wound care re-eval complete. Pt presents with R posterolateral wound secondary to non healing surgical wound. Today pt agreeable to session and placement of wound vac. PT began by rolling pt to L side to expose wound. Initially the wound bed had woven gauze packed into and covered with pink pressure protective barrier. PT removed gauze with saline and prepped periwound area. The wound is measured at 1.7 cm x 1.2 x 1.7 cm. There also was tunnel noted from 3 to 5 using clock method that measured 3.1 cm. No foul odor or drainage present. One black foam was cut and placed into wound bed, covered with transparent covering and trackpad and NPWT intiated. Pt and caregiver educated on abnormal alarmings, excessive drainage and when the wound vac would be checked and changed.      Anticipated Discharge Disposition (PT): home with 24/7 care, home with home health

## 2024-08-19 NOTE — THERAPY TREATMENT NOTE
Acute Care - Speech Language Pathology   Swallow Treatment Note [facility]     Patient Name: Tawny Shin  : 1955  MRN: [id number]  Today's Date: 2024               Admit Date: 2024    Patient seen to address dysphagia. Patient denied pain. Patient seen over a portion of breakfast. Patient exhibited no difficulty swallowing. No report of esophageal symptoms. No s/s aspiration. Patient consumed medications with thin liquids. No difficulty swallowing. No report of esophageal symptoms. No s/s aspiration. Patient reported she has not displayed regurgitation, vomiting, or nausea in a few days. During a med review with patient RN, it was noted the patient is on protonix, which according to her report is a new medication. ST reviewed the medication use with patient and the patient expressed she was glad something stopped the feeling of nausea and regurgitation she had been feeling. ST reviewed reflux precautions such as avoiding eating within 2 hours of reclining for sleep and remaining at least 30 degree HOB elevation while sleeping. Patient and sitter expressed understanding. Continue with current diet of regular consistency foods and thin liquids. ST will follow up x1 session to assure safe diet intake.     Skye Carvalho, SLP 2024 10:44 CDT      Visit Dx:     ICD-10-CM ICD-9-CM   1. Urinary tract infection, bacterial  N39.0 599.0    A49.9 041.9   2. Demand ischemia  I24.89 411.89   3. Esophageal dysphagia  R13.19 787.29   4. Impaired mobility [Z74.09]  Z74.09 799.89     Patient Active Problem List   Diagnosis    T12 compression fracture, initial encounter    Chronic embolism and thrombosis of unspecified deep veins of left lower extremity    Chronic anticoagulation    Iron deficiency anemia    Osteoporosis    E coli bacteremia    Epidural hematoma    Pleural effusion, left    Functional neurological symptom disorder with weakness or paralysis    Class 1 obesity due to excess calories with  serious comorbidity and body mass index (BMI) of 33.0 to 33.9 in adult    Rheumatoid arthritis involving multiple sites    Chronic heart failure with preserved ejection fraction    Venous insufficiency    Coronary artery disease involving native coronary artery of native heart without angina pectoris    Sick sinus syndrome    Essential hypertension    Pressure injury of skin of heel    Chronic pain    Anemia of chronic disease    Cholelithiasis    Pressure injury of skin of buttock    Near functional paraplegia    Skin cancer    Squamous cell carcinoma of back    Hematoma    Right-sided chest wall pain    Hyperlipidemia LDL goal <70    Malodorous urine    Stage 3b chronic kidney disease    Cyst of buttocks    Sepsis due to Escherichia coli without acute organ dysfunction    Generalized weakness    Paralysis    History of urinary retention    Bacteremia due to Enterobacter species    Positive blood culture, contaminant    Hypothyroidism    Abnormal CT of the abdomen    Presence of cardiac pacemaker    Esophageal dysphagia    Acute gout    PINKY (acute kidney injury)    E. 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    Elevated cholesterol     Eustachian tube dysfunction     Heart disease     Herpes simplex     History of transfusion     Hyperlipidemia     Hypertension     Hypothyroidism 12/23/2020    Intrinsic asthma 12/23/2020    Knee dislocation     Labral tear of  "right hip joint     Laryngitis sicca     Laryngitis, chronic     Left carotid bruit 03/09/2016    MI (myocardial infarction)     Myalgia due to statin 06/25/2019    Open wound of right hip 09/14/2021    Osteomyelitis of right femur 07/06/2021    Otorrhea     Pacemaker 11/17/2016    Primary osteoarthritis of left knee 12/23/2020    Psoriasis vulgaris 12/23/2020    S/P coronary artery stent placement 03/09/2016    Sensorineural hearing loss     Seropositive rheumatoid arthritis of multiple sites 12/27/2019    Overview:  -myochrysine '93-'96 -methotrexate '96--->11/98;r/s  restarted 2/99--> 8/14 (anemia) -sulfasalazine- not effective -penicillamine 6/98-->10/98; no effect -leflunomide 11/98--> - Humira '13-->didn't take - Enbrel 12/14-->3/15- no effect!   Last Assessment & Plan:  - \"aching all over\" because she had to be off her anti-rheumatic drugs for 2 weeks in preparation for her R knee surgery - he    Sick sinus syndrome 12/27/2019    Sjogren's disease     Spondylolisthesis of lumbar region 01/17/2018    Syncope, recurrent 02/08/2021    Urinary tract infection     UTI (urinary tract infection) 04/19/2024    UTI (urinary tract infection)      Past Surgical History:   Procedure Laterality Date    A-V CARDIAC PACEMAKER INSERTION  2016    ATRIAL CARDIAC PACEMAKER INSERTION      CARDIAC CATHETERIZATION      CATARACT EXTRACTION      CERVICAL CORPECTOMY N/A 03/03/2021    Procedure: CERVICAL 6 CORPECTOMY WITH TITANIUM CAGE WITH NEURO MONITORING;  Surgeon: Bandar Shea MD;  Location: [facility] OR;  Service: Neurosurgery;  Laterality: N/A;    CHOLECYSTECTOMY WITH INTRAOPERATIVE CHOLANGIOGRAM N/A 07/03/2024    Procedure: CHOLECYSTECTOMY LAPAROSCOPIC WITH INTRAOPERATIVE CHOLANGIOGRAM;  Surgeon: Moises Keyes MD;  Location: [facility] OR;  Service: General;  Laterality: N/A;    COLONOSCOPY  11/08/2011    One fold in the ascending colon which showed ulcer otherwise normal exam    COLONOSCOPY  11/12/2004    Normal exam " repeat in five years    CORONARY ANGIOPLASTY WITH STENT PLACEMENT      X 2; 2013 & 2014    CYSTOSCOPY BLADDER STONE LITHOTRIPSY      ENDOSCOPY  07/10/2014    Normal exam    ENDOSCOPY N/A 05/30/2024    Procedure: ESOPHAGOGASTRODUODENOSCOPY WITH ANESTHESIA;  Surgeon: Taiwo Sanchez MD;  Location:  [facility];  Service: Gastroenterology;  Laterality: N/A;  preop; dysphagia  postop: balloon dilation  PCP Jesus Manuel jeff    FLAP LEG Right 09/14/2021    Procedure: RIGHT GLUTEAL FASCIOCUTANEOUS ADVANCEMENT FLAP AND RIGHT TENSOR FASCIAL JESSICA FLAP;  Surgeon: Amadeo Turner MD;  Location:  [facility] OR;  Service: Plastics;  Laterality: Right;    GALLBLADDER SURGERY      HIP ABDUCTION TENOTOMY BILATERAL Right 01/14/2021    Procedure: RIGHT HIP GLUTEUS MEDLUS / MINIMUS REPAIR, POSSIBLE ACHILLES ALLOGRAFT;  Surgeon: Nino Carlson MD;  Location:  [facility] OR;  Service: Orthopedics;  Laterality: Right;    INCISION AND DRAINAGE ABSCESS Right 06/04/2022    Procedure: INCISION AND DRAINAGE ABSCESS right hip;  Surgeon: Magda Salcido MD;  Location:  [facility] OR;  Service: General;  Laterality: Right;    INCISION AND DRAINAGE ABSCESS Right 06/10/2022    Procedure: RIGHT HIP INCISION AND DRAINAGE. MD NEEDS 3L VANC IRRIGATION, CURRETTES, DAICANS, KERLEX ROLLS;  Surgeon: Amadeo Turner MD;  Location:  [facility] OR;  Service: Plastics;  Laterality: Right;    INCISION AND DRAINAGE HIP Right 02/09/2021    Procedure: HIP INCISION AND DRAINAGE;  Surgeon: Nino Carlson MD;  Location:  [facility] OR;  Service: Orthopedics;  Laterality: Right;    INCISION AND DRAINAGE LEG Right 10/24/2021    Procedure: INCISION AND DRAINAGE LOWER EXTREMITY;  Surgeon: Amadeo Turner MD;  Location:  [facility] OR;  Service: Plastics;  Laterality: Right;    INCISION AND DRAINAGE OF WOUND Right 07/08/2021    Procedure: INCISION AND DRAINAGE WOUND RIGHT HIP;  Surgeon: James Huntley MD;  Location:  [facility] OR;  Service: Orthopedics;  Laterality: Right;    JOINT  REPLACEMENT      KYPHOPLASTY WITH BIOPSY Bilateral 10/26/2021    Procedure: THOARCIC 12 KYPHOPLASTY WITH BIOPSY;  Surgeon: Bandar Shea MD;  Location:  [facility] OR;  Service: Neurosurgery;  Laterality: Bilateral;    LEG DEBRIDEMENT Right 09/14/2021    Procedure: DEBRIDEMENT OF RIGHT HIP WOUND, RIGHT GLUTEAL FASCIOCUTANEOUS ADVANCEMENT FLAP AND RIGHT TENSOR FASCIAL JESSICA FLAP;  Surgeon: Amadeo Turner MD;  Location:  [facility] OR;  Service: Plastics;  Laterality: Right;    LUMBAR DISCECTOMY Right 03/23/2021    Procedure: LUMBAR DISCECTOMY MICRO, Lumbar 1/2 right;  Surgeon: Bandar Shea MD;  Location:  [facility] OR;  Service: Neurosurgery;  Laterality: Right;    LUMBAR FUSION N/A 01/19/2018    Procedure: L3-4,L4-5 DECOMPRESSION, POSTERIOR SPINAL FUSION WITH INSTRUMENTATION;  Surgeon: Fortino Oropeza MD;  Location:  [facility] OR;  Service:     LUMBAR LAMINECTOMY WITH FUSION Left 01/17/2018    Procedure: LEFT L3-4 L4-5 LATERAL LUMBAR INTERBODY FUSION;  Surgeon: Fortino Oropeza MD;  Location:  [facility] OR;  Service:     MASS EXCISION Right 04/23/2024    Procedure: RIGHT BUTTOCK MASS EXCISION;  Surgeon: Moises Keyes MD;  Location:  [facility] OR;  Service: General;  Laterality: Right;    MYRINGOTOMY W/ TUBES  09/04/2014    TUBES NO LONGER IN PLACE    OTHER SURGICAL HISTORY      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 so hardware was removed and spacers were placed    REPLACEMENT TOTAL KNEE Right     URETEROSCOPY LASER LITHOTRIPSY WITH STENT INSERTION N/A 06/21/2024    Procedure: URETEROSCOPY LASER LITHOTRIPSY WITH STENT INSERTION LEFT;  Surgeon: Ky Plascencia MD;  Location:  [facility] OR;  Service: Urology;  Laterality: N/A;       SLP Recommendation and Plan     SLP Diet Recommendation: regular textures, thin liquids (08/19/24 0835)  Recommended Precautions and Strategies: upright posture during/after eating, small bites of food and sips of liquid, alternate between small bites of food and sips of liquid, general  aspiration precautions, fatigue precautions, reflux precautions (08/19/24 0835)  SLP Rec. for Method of Medication Administration: meds whole, with puree (08/19/24 0835)     Monitor for Signs of Aspiration: yes, notify SLP if any concerns (08/19/24 0835)              Therapy Frequency (Swallow): PRN (08/19/24 0835)  Predicted Duration Therapy Intervention (Days): 1 week (08/19/24 0835)  Oral Care Recommendations: Oral Care BID/PRN, Toothbrush (08/19/24 0835)     Swallowing Considerations per Physician Discretion: medical management of suspected esophageal dysphagia, as indicated (08/19/24 0835)  Daily Summary of Progress (SLP): progress toward functional goals as expected (08/19/24 0835)               Treatment Assessment (SLP): improved (08/19/24 0835)  Treatment Assessment Comments (SLP): see note (08/19/24 0835)  Plan for Continued Treatment (SLP): continue treatment per plan of care (08/19/24 0835)         Plan of Care Reviewed With: patient, caregiver  Progress: improving      SWALLOW EVALUATION (Last 72 Hours)       SLP Adult Swallow Evaluation       Row Name 08/19/24 0835 08/17/24 1129                Rehab Evaluation    Document Type therapy note (daily note)  -MD evaluation  -MG       Subjective Information no complaints  -MD no complaints  -MG       Patient Observations alert;cooperative;agree to therapy  -MD alert;cooperative;agree to therapy  -MG       Patient/Family/Caregiver Comments/Observations sitter present  -MD --       Patient Effort good  -MD good  -MG       Symptoms Noted During/After Treatment none  -MD none  -MG          General Information    Patient Profile Reviewed -- yes  -MG       Pertinent History Of Current Problem -- Presented due to confusion and fever. Decreased appetite and generalized weakness. Concern for UTI. Prior history of esophageal dilation.  -MG       Current Method of Nutrition -- clear liquids  -MG       Precautions/Limitations, Vision -- WFL;for purposes of eval  -MG        Precautions/Limitations, Hearing -- WFL;for purposes of eval  -MG       Prior Level of Function-Communication -- WFL  -MG       Prior Level of Function-Swallowing -- no diet consistency restrictions;esophageal concerns  -MG       Plans/Goals Discussed with -- patient;family;agreed upon  -MG       Barriers to Rehab -- previous functional deficit  -MG       Patient's Goals for Discharge -- return to PO diet  -MG       Family Goals for Discharge -- patient able to return to PO diet  -MG          Pain    Additional Documentation -- Pain Scale: FACES Pre/Post-Treatment (Group)  -MG          Pain Scale: FACES Pre/Post-Treatment    Pain: FACES Scale, Pretreatment -- 0-->no hurt  -MG       Posttreatment Pain Rating -- 0-->no hurt  -MG          Oral Motor Structure and Function    Dentition Assessment -- natural, present and adequate  -MG       Secretion Management -- WNL/WFL  -MG       Mucosal Quality -- dry  -MG       Volitional Swallow -- WFL  -MG       Volitional Cough -- WFL  -MG          Oral Musculature and Cranial Nerve Assessment    Oral Motor General Assessment -- WFL  -MG          General Eating/Swallowing Observations    Eating/Swallowing Skills -- self-fed;fed by SLP  -MG       Positioning During Eating -- upright in bed  -MG       Utensils Used -- spoon;straw  -MG       Consistencies Trialed -- regular textures;pureed;thin liquids  -MG          Clinical Swallow Eval    Oral Prep Phase -- WFL  -MG       Oral Transit -- WFL  -MG       Oral Residue -- WFL  -MG       Pharyngeal Phase -- no overt signs/symptoms of pharyngeal impairment  -MG       Esophageal Phase -- suspected esophageal impairment  -MG       Clinical Swallow Evaluation Summary -- See note  -MG          Esophageal Phase Concerns    Esophageal Phase Concerns -- other (see comments)  -MG       Esophageal Phase Concerns, Comment -- reported by RN and patient caregiver at baseline  -MG          SLP Evaluation Clinical Impression    SLP Swallowing  Diagnosis -- functional oral phase;R/O pharyngeal dysphagia;suspected esophageal dysphagia  -MG       Functional Impact -- risk of aspiration/pneumonia;risk of malnutrition;risk of dehydration  -MG       Rehab Potential/Prognosis, Swallowing -- good, to achieve stated therapy goals  -MG       Swallow Criteria for Skilled Therapeutic Interventions Met -- demonstrates skilled criteria  -MG          SLP Treatment Clinical Impressions    Treatment Assessment (SLP) improved  -MD --       Treatment Assessment Comments (SLP) see note  -MD --       Daily Summary of Progress (SLP) progress toward functional goals as expected  -MD --       Barriers to Overall Progress (SLP) Medically complex  -MD --       Plan for Continued Treatment (SLP) continue treatment per plan of care  -MD --       Care Plan Review risks/benefits reviewed;current/potential barriers reviewed;patient/other agree to care plan  -MD --       Care Plan Review, Other Participant(s) caregiver  -MD --          Recommendations    Therapy Frequency (Swallow) PRN  -MD PRN  -MG       Predicted Duration Therapy Intervention (Days) 1 week  -MD 1 week  -MG       SLP Diet Recommendation regular textures;thin liquids  -MD regular textures;thin liquids  -MG       Recommended Diagnostics -- VFSS (MBS)  with any increased concerns  -MG       Recommended Precautions and Strategies upright posture during/after eating;small bites of food and sips of liquid;alternate between small bites of food and sips of liquid;general aspiration precautions;fatigue precautions;reflux precautions  -MD upright posture during/after eating;small bites of food and sips of liquid;alternate between small bites of food and sips of liquid;general aspiration precautions;fatigue precautions;reflux precautions  -MG       Oral Care Recommendations Oral Care BID/PRN;Toothbrush  -MD Oral Care BID/PRN;Toothbrush  -MG       SLP Rec. for Method of Medication Administration meds whole;with bon BRYANT meds  whole;with puree  -MG       Monitor for Signs of Aspiration yes;notify SLP if any concerns  -MD yes;notify SLP if any concerns  -MG       Swallowing Considerations per Physician Discretion medical management of suspected esophageal dysphagia, as indicated  -MD medical management of suspected esophageal dysphagia, as indicated  -MG          Swallow Goals (SLP)    Swallow LTGs Swallow Long Term Goal (free text)  -MD Swallow Long Term Goal (free text)  -MG       Swallow STGs diet tolerance goal selection (SLP);swallow compensatory strategies goal selection (SLP)  -MD diet tolerance goal selection (SLP);swallow compensatory strategies goal selection (SLP)  -MG       Diet Tolerance Goal Selection (SLP) Patient will tolerate trials of  -MD Patient will tolerate trials of  -MG       Swallow Compensatory Strategies Goal Selection (SLP) swallow compensatory strategies, SLP goal 1  -MD swallow compensatory strategies, SLP goal 1  -MG          (LTG) Swallow    (LTG) Swallow Patient will tolerate least restrictive diet without overt s/s of aspiration.  -MD Patient will tolerate least restrictive diet without overt s/s of aspiration.  -MG       [place] (Swallow Long Term Goal) independently (over 90% accuracy)  -MD independently (over 90% accuracy)  -MG       Time Frame (Swallow Long Term Goal) by discharge  -MD by discharge  -MG       Barriers (Swallow Long Term Goal) none  -MD none  -MG       Progress/Outcomes (Swallow Long Term Goal) good progress toward goal  -MD new goal  -MG          (STG) Patient will tolerate trials of    Consistencies Trialed (Tolerate trials) regular textures;thin liquids  -MD regular textures;thin liquids  -MG       Desired Outcome (Tolerate trials) without signs/symptoms of aspiration;without signs of distress;with adequate oral prep/transit/clearance  -MD without signs/symptoms of aspiration;without signs of distress;with adequate oral prep/transit/clearance  -MG       [place] (Tolerate  trials) independently (over 90% accuracy)  -MD independently (over 90% accuracy)  -MG       Time Frame (Tolerate trials) by discharge  -MD by discharge  -MG       Progress/Outcomes (Tolerate trials) good progress toward goal  -MD new goal  -MG          (STG) Swallow Compensatory Strategies Goal 1 (SLP)    Activity (Swallow Compensatory Strategies/Techniques Goal 1, SLP) reflux precautions;compensatory strategies;postural techniques;during meal intake  -MD reflux precautions;compensatory strategies;postural techniques;during meal intake  -MG       [place]/Accuracy (Swallow Compensatory Strategies/Techniques Goal 1, SLP) independently (over 90% accuracy)  -MD independently (over 90% accuracy)  -MG       Time Frame (Swallow Compensatory Strategies/Techniques Goal 1, SLP) by discharge  -MD by discharge  -MG       Barriers (Swallow Compensatory Strategies/Techniques Goal 1, SLP) none  -MD none  -MG       Progress/Outcomes (Swallow Compensatory Strategies/Techniques Goal 1, SLP) good progress toward goal  -MD new goal  -MG                 User Key  (r) = Recorded By, (t) = Taken By, (c) = Cosigned By      Initials Name Effective Dates    MG Nicole Amin, MS [facility]-SLP 07/11/23 -     Skye Tinajero, SLP 06/21/22 -                     EDUCATION  The patient has been educated in the following areas:   Dysphagia (Swallowing Impairment).        SLP GOALS       Row Name 08/19/24 0835 08/17/24 1129          (LTG) Swallow    (LTG) Swallow Patient will tolerate least restrictive diet without overt s/s of aspiration.  -MD Patient will tolerate least restrictive diet without overt s/s of aspiration.  -MG     [place] (Swallow Long Term Goal) independently (over 90% accuracy)  -MD independently (over 90% accuracy)  -MG     Time Frame (Swallow Long Term Goal) by discharge  -MD by discharge  -MG     Barriers (Swallow Long Term Goal) none  -MD none  -MG     Progress/Outcomes (Swallow Long Term Goal) good progress toward  goal  -MD new goal  -MG        (STG) Patient will tolerate trials of    Consistencies Trialed (Tolerate trials) regular textures;thin liquids  -MD regular textures;thin liquids  -MG     Desired Outcome (Tolerate trials) without signs/symptoms of aspiration;without signs of distress;with adequate oral prep/transit/clearance  -MD without signs/symptoms of aspiration;without signs of distress;with adequate oral prep/transit/clearance  -MG     [place] (Tolerate trials) independently (over 90% accuracy)  -MD independently (over 90% accuracy)  -MG     Time Frame (Tolerate trials) by discharge  -MD by discharge  -MG     Progress/Outcomes (Tolerate trials) good progress toward goal  -MD new goal  -MG        (STG) Swallow Compensatory Strategies Goal 1 (SLP)    Activity (Swallow Compensatory Strategies/Techniques Goal 1, SLP) reflux precautions;compensatory strategies;postural techniques;during meal intake  -MD reflux precautions;compensatory strategies;postural techniques;during meal intake  -MG     [place]/Accuracy (Swallow Compensatory Strategies/Techniques Goal 1, SLP) independently (over 90% accuracy)  -MD independently (over 90% accuracy)  -MG     Time Frame (Swallow Compensatory Strategies/Techniques Goal 1, SLP) by discharge  -MD by discharge  -MG     Barriers (Swallow Compensatory Strategies/Techniques Goal 1, SLP) none  -MD none  -MG     Progress/Outcomes (Swallow Compensatory Strategies/Techniques Goal 1, SLP) good progress toward goal  -MD new goal  -MG               User Key  (r) = Recorded By, (t) = Taken By, (c) = Cosigned By      Initials Name Provider Type    MG Nicole Amin, MS CCC-SLP Speech and Language Pathologist    Skye Tinajero, SLP Speech and Language Pathologist                         Time Calculation:    Time Calculation- SLP       Row Name 08/19/24 1042             Time Calculation- SLP    SLP Start Time 0835  -MD      SLP Stop Time 0919  -MD      SLP Time Calculation (min) 44  min  -MD      SLP Received On 08/19/24  -MD         Untimed Charges    33854-[facility] Treatment Swallow Minutes 44  -MD         Total Minutes    Untimed Charges Total Minutes 44  -MD       Total Minutes 44  -MD                User Key  (r) = Recorded By, (t) = Taken By, (c) = Cosigned By      Initials Name Provider Type    Skye Tinajero, SLP Speech and Language Pathologist                    Therapy Charges for Today       Code Description Service Date Service Provider Modifiers Qty    [id number]  ST TREATMENT SWALLOW 3 8/19/2024 Skye Carvalho, SLP GN 1                 Skye Carvalho, GT  8/19/2024

## 2024-08-19 NOTE — PLAN OF CARE
Goal Outcome Evaluation:  Plan of Care Reviewed With: patient, caregiver        Progress: no change  Outcome Evaluation: OT eval complete. Pt presents in fowlers with caregiver at bedside. Pt seems somewhat confused regarding recent PLOF but has experienced a fxnal decline in the last 2 weeks & has been dependent on her lift at home. Today she requires modA for sup>sit. Demos functional BUE ROM however generalized weakness. She stood x3 with modA x2 for short duration each time. On 4th attempt, pt required maxA x2 and was unable to stand fully d/t worsening fatigue. Returned to supine with mod-maxA. Pt demos decreased act cara, strength, ADL independence, & balance. Skilled OT indicated to address these deficits & reduce her fall risk. Anticipate she will d/c home w 24/7 assist & HH therapy.

## 2024-08-19 NOTE — NURSING NOTE
[facility]  INPATIENT WOUND & OSTOMY CARE    Today's Date: 08/19/24    Patient Name: Tawny Shin  MRN: [id number]  CSN: [id number]  PCP: Moises Oliveira MD  Attending Provider: Nayan Hale DO  Length of Stay: 1    Rounded with DANIELA Zhang for initial consult. See her note for details. Consult was placed for right hip wound that was present on admission. Patient follows with our outpatient wound care center.     Wound vac in place. Removed wound vac and cleaned wound. Updated picture taken and uploaded to the chart. Wound vac order placed for PT to apply and change.     Pressure ulcer prevention orders have been placed. I also ordered a dolphin mattress.     Inpatient wound care will continue to follow during hospital stay.  Please contact if any issues or concerns arise.     This document has been electronically signed by Lea Romero RN on 8/19/2024 11:10 CDT

## 2024-08-19 NOTE — THERAPY EVALUATION
Patient Name: Tawny Shin  : 1955    MRN: [id number]                              Today's Date: 2024       Admit Date: 2024    Visit Dx:     ICD-10-CM ICD-9-CM   1. Urinary tract infection, bacterial  N39.0 599.0    A49.9 041.9   2. Demand ischemia  I24.89 411.89   3. Esophageal dysphagia  R13.19 787.29   4. Impaired mobility [Z74.09]  Z74.09 799.89     Patient Active Problem List   Diagnosis    T12 compression fracture, initial encounter    Chronic embolism and thrombosis of unspecified deep veins of left lower extremity    Chronic anticoagulation    Iron deficiency anemia    Osteoporosis    E coli bacteremia    Epidural hematoma    Pleural effusion, left    Functional neurological symptom disorder with weakness or paralysis    Class 1 obesity due to excess calories with serious comorbidity and body mass index (BMI) of 33.0 to 33.9 in adult    Rheumatoid arthritis involving multiple sites    Chronic heart failure with preserved ejection fraction    Venous insufficiency    Coronary artery disease involving native coronary artery of native heart without angina pectoris    Sick sinus syndrome    Essential hypertension    Pressure injury of skin of heel    Chronic pain    Anemia of chronic disease    Cholelithiasis    Pressure injury of skin of buttock    Near functional paraplegia    Skin cancer    Squamous cell carcinoma of back    Hematoma    Right-sided chest wall pain    Hyperlipidemia LDL goal <70    Malodorous urine    Stage 3b chronic kidney disease    Cyst of buttocks    Sepsis due to Escherichia coli without acute organ dysfunction    Generalized weakness    Paralysis    History of urinary retention    Bacteremia due to Enterobacter species    Positive blood culture    Hypothyroidism    Abnormal CT of the abdomen    Presence of cardiac pacemaker    Esophageal dysphagia    Acute gout    UTI (urinary tract infection), bacterial    PINKY (acute kidney injury)    UTI (urinary tract infection)  "    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    Elevated cholesterol     Eustachian tube dysfunction     Heart disease     Herpes simplex     History of transfusion     Hyperlipidemia     Hypertension     Hypothyroidism 12/23/2020    Intrinsic asthma 12/23/2020    Knee dislocation     Labral tear of right hip joint     Laryngitis sicca     Laryngitis, chronic     Left carotid bruit 03/09/2016    MI (myocardial infarction)     Myalgia due to statin 06/25/2019    Open wound of right hip 09/14/2021    Osteomyelitis of right femur 07/06/2021    Otorrhea     Pacemaker 11/17/2016    Primary osteoarthritis of left knee 12/23/2020    Psoriasis vulgaris 12/23/2020    S/P coronary artery stent placement 03/09/2016    Sensorineural hearing loss     Seropositive rheumatoid arthritis of multiple sites 12/27/2019    Overview:  -myochrysine '93-'96 -methotrexate '96--->11/98;r/s  restarted 2/99--> 8/14 (anemia) -sulfasalazine- not effective -penicillamine 6/98-->10/98; no effect -leflunomide 11/98--> - Humira '13-->didn't take - Enbrel 12/14-->3/15- no effect!   Last Assessment & Plan:  - \"aching all over\" because she had to be off her anti-rheumatic drugs for 2 weeks in preparation for her R knee surgery - he    Sick sinus syndrome 12/27/2019    Sjogren's disease     Spondylolisthesis of lumbar region 01/17/2018    Syncope, recurrent 02/08/2021    Urinary tract infection     UTI (urinary tract infection) " 04/19/2024    UTI (urinary tract infection)      Past Surgical History:   Procedure Laterality Date    A-V CARDIAC PACEMAKER INSERTION  2016    ATRIAL CARDIAC PACEMAKER INSERTION      CARDIAC CATHETERIZATION      CATARACT EXTRACTION      CERVICAL CORPECTOMY N/A 03/03/2021    Procedure: CERVICAL 6 CORPECTOMY WITH TITANIUM CAGE WITH NEURO MONITORING;  Surgeon: Bandar Shea MD;  Location: [facility] OR;  Service: Neurosurgery;  Laterality: N/A;    CHOLECYSTECTOMY WITH INTRAOPERATIVE CHOLANGIOGRAM N/A 07/03/2024    Procedure: CHOLECYSTECTOMY LAPAROSCOPIC WITH INTRAOPERATIVE CHOLANGIOGRAM;  Surgeon: Moises Keyes MD;  Location:  [facility] OR;  Service: General;  Laterality: N/A;    COLONOSCOPY  11/08/2011    One fold in the ascending colon which showed ulcer otherwise normal exam    COLONOSCOPY  11/12/2004    Normal exam repeat in five years    CORONARY ANGIOPLASTY WITH STENT PLACEMENT      X 2; 2013 & 2014    CYSTOSCOPY BLADDER STONE LITHOTRIPSY      ENDOSCOPY  07/10/2014    Normal exam    ENDOSCOPY N/A 05/30/2024    Procedure: ESOPHAGOGASTRODUODENOSCOPY WITH ANESTHESIA;  Surgeon: Taiwo Sanchez MD;  Location: [facility] ENDOSCOPY;  Service: Gastroenterology;  Laterality: N/A;  preop; dysphagia  postop: balloon dilation  PCP Jesus Manuel jeff    FLAP LEG Right 09/14/2021    Procedure: RIGHT GLUTEAL FASCIOCUTANEOUS ADVANCEMENT FLAP AND RIGHT TENSOR FASCIAL JESSICA FLAP;  Surgeon: Amadeo Turner MD;  Location:  [facility] OR;  Service: Plastics;  Laterality: Right;    GALLBLADDER SURGERY      HIP ABDUCTION TENOTOMY BILATERAL Right 01/14/2021    Procedure: RIGHT HIP GLUTEUS MEDLUS / MINIMUS REPAIR, POSSIBLE ACHILLES ALLOGRAFT;  Surgeon: Nino Carlson MD;  Location: [facility] OR;  Service: Orthopedics;  Laterality: Right;    INCISION AND DRAINAGE ABSCESS Right 06/04/2022    Procedure: INCISION AND DRAINAGE ABSCESS right hip;  Surgeon: Magda Salcido MD;  Location:  [facility] OR;  Service: General;  Laterality: Right;    INCISION AND  DRAINAGE ABSCESS Right 06/10/2022    Procedure: RIGHT HIP INCISION AND DRAINAGE. MD NEEDS 3L VANC IRRIGATION, CURRETTES, DAICANS, KERLEX ROLLS;  Surgeon: Amadeo Turner MD;  Location:  [facility] OR;  Service: Plastics;  Laterality: Right;    INCISION AND DRAINAGE HIP Right 02/09/2021    Procedure: HIP INCISION AND DRAINAGE;  Surgeon: Nino Carlson MD;  Location:  [facility] OR;  Service: Orthopedics;  Laterality: Right;    INCISION AND DRAINAGE LEG Right 10/24/2021    Procedure: INCISION AND DRAINAGE LOWER EXTREMITY;  Surgeon: Amadeo Turner MD;  Location:  [facility] OR;  Service: Plastics;  Laterality: Right;    INCISION AND DRAINAGE OF WOUND Right 07/08/2021    Procedure: INCISION AND DRAINAGE WOUND RIGHT HIP;  Surgeon: James Huntley MD;  Location:  [facility] OR;  Service: Orthopedics;  Laterality: Right;    JOINT REPLACEMENT      KYPHOPLASTY WITH BIOPSY Bilateral 10/26/2021    Procedure: THOARCIC 12 KYPHOPLASTY WITH BIOPSY;  Surgeon: Bandar Shea MD;  Location:  [facility] OR;  Service: Neurosurgery;  Laterality: Bilateral;    LEG DEBRIDEMENT Right 09/14/2021    Procedure: DEBRIDEMENT OF RIGHT HIP WOUND, RIGHT GLUTEAL FASCIOCUTANEOUS ADVANCEMENT FLAP AND RIGHT TENSOR FASCIAL JESSICA FLAP;  Surgeon: Amadeo Turner MD;  Location:  [facility] OR;  Service: Plastics;  Laterality: Right;    LUMBAR DISCECTOMY Right 03/23/2021    Procedure: LUMBAR DISCECTOMY MICRO, Lumbar 1/2 right;  Surgeon: Bandar Shea MD;  Location:  [facility] OR;  Service: Neurosurgery;  Laterality: Right;    LUMBAR FUSION N/A 01/19/2018    Procedure: L3-4,L4-5 DECOMPRESSION, POSTERIOR SPINAL FUSION WITH INSTRUMENTATION;  Surgeon: Fortino Oropeza MD;  Location:  [facility] OR;  Service:     LUMBAR LAMINECTOMY WITH FUSION Left 01/17/2018    Procedure: LEFT L3-4 L4-5 LATERAL LUMBAR INTERBODY FUSION;  Surgeon: Fortino Oropeza MD;  Location:  [facility] OR;  Service:     MASS EXCISION Right 04/23/2024    Procedure: RIGHT BUTTOCK MASS EXCISION;   Surgeon: Moises Keyes MD;  Location:  [facility] OR;  Service: General;  Laterality: Right;    MYRINGOTOMY W/ TUBES  09/04/2014    TUBES NO LONGER IN PLACE    OTHER SURGICAL HISTORY      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 so hardware was removed and spacers were placed    REPLACEMENT TOTAL KNEE Right     URETEROSCOPY LASER LITHOTRIPSY WITH STENT INSERTION N/A 06/21/2024    Procedure: URETEROSCOPY LASER LITHOTRIPSY WITH STENT INSERTION LEFT;  Surgeon: Ky Plascencia MD;  Location:  [facility] OR;  Service: Urology;  Laterality: N/A;      General Information       Row Name 08/19/24 0908          Physical Therapy Time and Intention    Document Type evaluation  presents with fever and confusion Dx; UTI and acute kidney injury. H/o chronic wounds, COPD  -AJ     Mode of Treatment physical therapy  -       Row Name 08/19/24 0908          General Information    Patient Profile Reviewed yes  -AJ     Prior Level of Function dependent:;w/c or scooter;community mobility;home management;all household mobility  -AJ     Existing Precautions/Restrictions fall;oxygen therapy device and L/min;other (see comments)  2L, wound vac R LE  -AJ     Barriers to Rehab medically complex;physical barrier  -       Row Name 08/19/24 0908          Living Environment    People in Home other (see comments)  24/7 caregiver  -AJ       Row Name 08/19/24 0908          Cognition    Orientation Status (Cognition) oriented x 4  -       Row Name 08/19/24 0908          Safety Issues, Functional Mobility    Safety Issues Affecting Function (Mobility) safety precaution awareness;safety precautions follow-through/compliance  -AJ     Impairments Affecting Function (Mobility) balance;strength;endurance/activity tolerance;pain;range of motion (ROM)  -AJ               User Key  (r) = Recorded By, (t) = Taken By, (c) = Cosigned By      Initials Name Provider Type    AJ Marco Perez PT DPT Physical Therapist                   Mobility       Row Name 08/19/24 0908           Bed Mobility    Bed Mobility rolling right;supine-sit;sit-supine  -     Rolling Right Oden (Bed Mobility) moderate assist (50% patient effort);2 person assist  -     Supine-Sit Oden (Bed Mobility) moderate assist (50% patient effort);1 person assist  -     Sit-Supine Oden (Bed Mobility) moderate assist (50% patient effort);maximum assist (25% patient effort);2 person assist  -     Assistive Device (Bed Mobility) bed rails;draw sheet;head of bed elevated  -AJ       Row Name 08/19/24 0908          Sit-Stand Transfer    Sit-Stand Oden (Transfers) moderate assist (50% patient effort);2 person assist;maximum assist (25% patient effort);other (see comments)  during final stand pt required max A and never reach fully extended LE  -     Assistive Device (Sit-Stand Transfers) walker, front-wheeled  -               User Key  (r) = Recorded By, (t) = Taken By, (c) = Cosigned By      Initials Name Provider Type    Marco House, PT DPT Physical Therapist                   Obj/Interventions       [facility] Name 08/19/24 0908          Range of Motion Comprehensive    General Range of Motion bilateral lower extremity ROM WFL  -     Comment, General Range of Motion R LE; hip flexion 25% and required hip ext rotation to gain available ROM, R leg ext 50% active, 75% PROM, R ankle DF 50%. L hip flexion 75%, L knee ext WFL, L ankle DF 50%  -[facility] Name 08/19/24 0908          Strength Comprehensive (MMT)    General Manual Muscle Testing (MMT) Assessment lower extremity strength deficits identified  -     Comment, General Manual Muscle Testing (MMT) Assessment R hip flexion 2+/5, R knee extension 2+/5. R knee flexion 3+/5, R Tib anterior/EHL 2/5, L hip flexion 3-/5, L knee extension 3+/5, L tib anterior/EHL 2+/5  -[facility] Name 08/19/24 0908          Motor Skills    Motor Skills functional endurance  -AJ       Row Name 08/19/24 0908          Balance    Balance Assessment sitting  static balance;sitting dynamic balance;standing static balance;standing dynamic balance  -AJ     Static Sitting Balance standby assist  -AJ     Dynamic Sitting Balance standby assist  -AJ     Position, Sitting Balance supported;unsupported;sitting edge of bed  -AJ     Static Standing Balance moderate assist;2-person assist  -AJ     Dynamic Standing Balance maximum assist;2-person assist  -AJ     Position/Device Used, Standing Balance supported;walker, front-wheeled  -AJ     Balance Interventions sitting;standing;sit to stand;supported;static;dynamic  -       Row Name 08/19/24 0908          Sensory Assessment (Somatosensory)    Sensory Assessment (Somatosensory) sensation intact  -               User Key  (r) = Recorded By, (t) = Taken By, (c) = Cosigned By      Initials Name Provider Type    Marco House, PT DPT Physical Therapist                   Goals/Plan       Row Name 08/19/24 0908          Bed Mobility Goal 1 (PT)    Activity/Assistive Device (Bed Mobility Goal 1, PT) rolling to left;rolling to right;sit to supine;supine to sit  -AJ     [place] Level/Cues Needed (Bed Mobility Goal 1, PT) minimum assist (75% or more patient effort)  -AJ     Time Frame (Bed Mobility Goal 1, PT) long term goal (LTG);10 days  -AJ     Progress/Outcomes (Bed Mobility Goal 1, PT) new goal  -       Row Name 08/19/24 0908          Transfer Goal 1 (PT)    Activity/Assistive Device (Transfer Goal 1, PT) sit-to-stand/stand-to-sit;bed-to-chair/chair-to-bed;wheelchair transfer  -AJ     [place] Level/Cues Needed (Transfer Goal 1, PT) moderate assist (50-74% patient effort)  -AJ     Time Frame (Transfer Goal 1, PT) long term goal (LTG);10 days  -AJ     Progress/Outcome (Transfer Goal 1, PT) new goal  -       Row Name 08/19/24 0908          Gait Training Goal 1 (PT)    Activity/Assistive Device (Gait Training Goal 1, PT) gait (walking locomotion);decrease asymmetrical patterns;decrease fall risk;diminish gait  deviation;forward stepping;improve balance and speed;increase endurance/gait distance;increase energy conservation;assistive device use;walker, rolling  -     [place] Level (Gait Training Goal 1, PT) moderate assist (50-74% patient effort)  -     Distance (Gait Training Goal 1, PT) 10 as needed for home and bathroom safety  -     Time Frame (Gait Training Goal 1, PT) long term goal (LTG);10 days  -     Progress/Outcome (Gait Training Goal 1, PT) new goal  -       Row Name 08/19/24 0943          Therapy Assessment/Plan (PT)    Planned Therapy Interventions (PT) balance training;bed mobility training;gait training;home exercise program;patient/family education;transfer training;strengthening;postural re-education;ROM (range of motion);wheelchair management/propulsion training  -               User Key  (r) = Recorded By, (t) = Taken By, (c) = Cosigned By      Initials Name Provider Type    Marco House PT DPT Physical Therapist                   Clinical Impression       Row Name 08/19/24 0974          Plan of Care Review    Plan of Care Reviewed With patient;caregiver  -     Outcome Evaluation PT eval complete. Pt in fowlers position upon arrival with caregiver at bedside. Pt reports declined functional ability over past 2 weeks and required lift for transfers. Today pt agreeable to session, required Mod A for bed mobility, supine>sit with Mod Ax1. In sitting pt demo decreased AROM and LE strength which is baseline;R LE; hip flexion 25% and required hip ext rotation to gain available ROM, R leg ext 50% active, 75% PROM, R ankle DF 50%. L hip flexion 75%, L knee ext WFL, L ankle DF 50%.R hip flexion 2+/5, R knee extension 2+/5. R knee flexion 3+/5, R Tib anterior/EHL 2/5, L hip flexion 3-/5, L knee extension 3+/5, L tib anterior/EHL 2+/5. Pt performed 4 sit<>stand with mod Ax2 during first 3 attempts and Max Ax2 during final attempt from PT and OT prior to returning to supine. Pt will benefit  from skilled PT services to improve activity tolerance, safe transfers and decrease fall risk. Recommend home with 24/7 assist and HH.  -       Row Name 08/19/24 0908          Therapy Assessment/Plan (PT)    Patient/Family Therapy Goals Statement (PT) get better and go home  -AJ     Rehab Potential (PT) fair, will monitor progress closely  -AJ     Criteria for Skilled Interventions Met (PT) yes;meets criteria;skilled treatment is necessary  -AJ     Therapy Frequency (PT) 2 times/day  -AJ     Predicted Duration of Therapy Intervention (PT) until d/c  -AJ       Row Name 08/19/24 0908          Vital Signs    Pre Patient Position Supine  -AJ     Intra Patient Position Standing  -AJ     Post Patient Position Supine  -AJ       Row Name 08/19/24 0908          Positioning and Restraints    Pre-Treatment Position in bed  -AJ     Post Treatment Position bed  -AJ     In Bed notified nsg;fowlers;call light within reach;encouraged to call for assist;exit alarm on;with family/caregiver;side rails up x2  -AJ               User Key  (r) = Recorded By, (t) = Taken By, (c) = Cosigned By      Initials Name Provider Type    Marco House, PT DPT Physical Therapist                   Outcome Measures       Row Name 08/19/24 0908          How much help from another person do you currently need...    Turning from your back to your side while in flat bed without using bedrails? 2  -AJ     Moving from lying on back to sitting on the side of a flat bed without bedrails? 2  -AJ     Moving to and from a bed to a chair (including a wheelchair)? 1  -AJ     Standing up from a chair using your arms (e.g., wheelchair, bedside chair)? 2  -AJ     Climbing 3-5 steps with a railing? 1  -AJ     To walk in hospital room? 1  -AJ     AM-PAC 6 Clicks Score (PT) 9  -AJ     Highest Level of Mobility Goal 3 --> Sit at edge of bed  -AJ       Row Name 08/19/24 0908          Functional Assessment    Outcome Measure Options AM-PAC 6 Clicks Basic Mobility (PT)   -               User Key  (r) = Recorded By, (t) = Taken By, (c) = Cosigned By      Initials Name Provider Type    Marco House PT DPT Physical Therapist                                 Physical Therapy Education       Title: PT OT SLP Therapies (Done)       Topic: Physical Therapy (Done)       Point: Mobility training (Done)       Learning Progress Summary             Patient Acceptance, E, VU by INDIRA at 8/19/2024 0949    Comment: role of PT, continued care, safe transfers                         Point: Home exercise program (Done)       Learning Progress Summary             Patient Acceptance, E, VU by INDIRA at 8/19/2024 0949    Comment: role of PT, continued care, safe transfers                         Point: Body mechanics (Done)       Learning Progress Summary             Patient Acceptance, E, VU by INDIRA at 8/19/2024 0949    Comment: role of PT, continued care, safe transfers                         Point: Precautions (Done)       Learning Progress Summary             Patient Acceptance, E, VU by INDIRA at 8/19/2024 0949    Comment: role of PT, continued care, safe transfers                                         User Key       Initials Effective Dates Name Provider Type [facility] 08/15/24 -  Marco Perez PT DPT Physical Therapist PT                  PT Recommendation and Plan  Planned Therapy Interventions (PT): balance training, bed mobility training, gait training, home exercise program, patient/family education, transfer training, strengthening, postural re-education, ROM (range of motion), wheelchair management/propulsion training  Plan of Care Reviewed With: patient, caregiver  Outcome Evaluation: PT eval complete. Pt in fowlers position upon arrival with caregiver at bedside. Pt reports declined functional ability over past 2 weeks and required lift for transfers. Today pt agreeable to session, required Mod A for bed mobility, supine>sit with Mod Ax1. In sitting pt demo decreased AROM and LE  strength which is baseline;R LE; hip flexion 25% and required hip ext rotation to gain available ROM, R leg ext 50% active, 75% PROM, R ankle DF 50%. L hip flexion 75%, L knee ext WFL, L ankle DF 50%.R hip flexion 2+/5, R knee extension 2+/5. R knee flexion 3+/5, R Tib anterior/EHL 2/5, L hip flexion 3-/5, L knee extension 3+/5, L tib anterior/EHL 2+/5. Pt performed 4 sit<>stand with mod Ax2 during first 3 attempts and Max Ax2 during final attempt from PT and OT prior to returning to supine. Pt will benefit from skilled PT services to improve activity tolerance, safe transfers and decrease fall risk. Recommend home with 24/7 assist and HH.     Time Calculation:         PT Charges       Row Name 08/19/24 0908             Time Calculation    Start Time 0907  -AJ      Stop Time 0931  +15 for chart review and communication  -      Time Calculation (min) 24 min  -AJ      PT Received On 08/19/24  -AJ      PT Goal Re-Cert Due Date 08/29/24  -AJ         Untimed Charges    PT Eval/Re-eval Minutes 39  -AJ         Total Minutes    Untimed Charges Total Minutes 39  -AJ       Total Minutes 39  -AJ                User Key  (r) = Recorded By, (t) = Taken By, (c) = Cosigned By      Initials Name Provider Type    Marco House PT DPT Physical Therapist                  Therapy Charges for Today       Code Description Service Date Service Provider Modifiers Qty    [id number] HC PT EVAL MOD COMPLEXITY 3 8/19/2024 Marco Perez PT DPT GP 1            PT G-Codes  Outcome Measure Options: AM-PAC 6 Clicks Basic Mobility (PT)  AM-PAC 6 Clicks Score (PT): 9  PT Discharge Summary  Anticipated Discharge Disposition (PT): home with 24/7 care, home with home health    Marco Perez PT DPT  8/19/2024

## 2024-08-19 NOTE — PLAN OF CARE
Goal Outcome Evaluation:  Plan of Care Reviewed With: patient, caregiver           Outcome Evaluation: PT eval complete. Pt in fowlers position upon arrival with caregiver at bedside. Pt reports declined functional ability over past 2 weeks and required lift for transfers. Today pt agreeable to session, required Mod A for bed mobility, supine>sit with Mod Ax1. In sitting pt demo decreased AROM and LE strength which is baseline;R LE; hip flexion 25% and required hip ext rotation to gain available ROM, R leg ext 50% active, 75% PROM, R ankle DF 50%. L hip flexion 75%, L knee ext WFL, L ankle DF 50%.R hip flexion 2+/5, R knee extension 2+/5. R knee flexion 3+/5, R Tib anterior/EHL 2/5, L hip flexion 3-/5, L knee extension 3+/5, L tib anterior/EHL 2+/5. Pt performed 4 sit<>stand with mod Ax2 during first 3 attempts and Max Ax2 during final attempt from PT and OT prior to returning to supine. Pt will benefit from skilled PT services to improve activity tolerance, safe transfers and decrease fall risk. Recommend home with 24/7 assist and HH.      Anticipated Discharge Disposition (PT): home with 24/7 care, home with home health

## 2024-08-19 NOTE — CASE MANAGEMENT/SOCIAL WORK
Discharge Planning Assessment  [facility]     Patient Name: Tawny Shin  MRN: [id number]  Today's Date: 8/19/2024    Admit Date: 8/16/2024        Discharge Needs Assessment       Row Name 08/19/24 1449       Living Environment    People in Home other (see comments)  Has 24 hours care with private sitters.    Unique Family Situation 24 hour sitters.    Current Living Arrangements home    Primary Care Provided by other (see comments)  sitters    Able to Return to Prior Arrangements yes       Transition Planning    Patient/Family Anticipates Transition to home with help/services    Patient/Family Anticipated Services at Transition outpatient care       Discharge Needs Assessment    Current Outpatient/Agency/Support Group homecare agency    Equipment Currently Used at Home wheelchair, motorized;shower chair;walker, rolling;lift device    Discharge Facility/Level of Care Needs home with home health;outpatient therapy    Current Discharge Risk chronically ill    Discharge Coordination/Progress Spoke with patient and sitter at bedside for dc planning. Patient lives at home with 24/7 care provided by private sitters. Has all dme needed. Goes to wound care 1 X / week. Current with [facility] for wound care / SN/ PT / OT. Plans to discharge home and continue same care.                   Discharge Plan    No documentation.                 Continued Care and Services - Admitted Since 8/16/2024    No active coordination exists for this encounter.       Selected Continued Care - Prior Encounters Includes continued care and service providers with selected services from prior encounters from 5/18/2024 to 8/19/2024      Discharged on 7/8/2024 Admission date: 6/12/2024 - Discharge disposition: Rehab Facility or Unit (DC - External)      Destination       Service Provider Selected Services Address Phone Fax Patient Preferred    [facility] [address] [place], [place] [address]  [phone] --                      Discharged on 5/31/2024 Admission date: 5/24/2024 - Discharge disposition: Home-Health Care Svc      Home Medical Care       Service Provider Selected Services Address Phone Fax Patient Preferred    [facility] ,  Home Rehabilitation [address] [place], [facility] [address] [phone] --                             Demographic Summary    No documentation.                  Functional Status    No documentation.                  Psychosocial    No documentation.                  Abuse/Neglect    No documentation.                  Legal    No documentation.                  Substance Abuse    No documentation.                  Patient Forms    No documentation.                     Merlina A Fletcher, RN

## 2024-08-20 ENCOUNTER — READMISSION MANAGEMENT (OUTPATIENT)
Dept: CALL CENTER | Facility: HOSPITAL | Age: 69
End: 2024-08-20
Payer: MEDICARE

## 2024-08-20 VITALS
DIASTOLIC BLOOD PRESSURE: 60 MMHG | HEART RATE: 79 BPM | WEIGHT: 195.99 LBS | HEIGHT: 63 IN | RESPIRATION RATE: 18 BRPM | TEMPERATURE: 98.3 F | SYSTOLIC BLOOD PRESSURE: 152 MMHG | OXYGEN SATURATION: 94 % | BODY MASS INDEX: 34.73 KG/M2

## 2024-08-20 PROBLEM — I50.32 CHRONIC HEART FAILURE WITH PRESERVED EJECTION FRACTION (HFPEF): Status: ACTIVE | Noted: 2024-08-20

## 2024-08-20 LAB
BACTERIA SPEC AEROBE CULT: ABNORMAL
BH BB BLOOD EXPIRATION DATE: NORMAL
BH BB BLOOD TYPE BARCODE: 6200
BH BB DISPENSE STATUS: NORMAL
BH BB PRODUCT CODE: NORMAL
BH BB UNIT NUMBER: NORMAL
CROSSMATCH INTERPRETATION: NORMAL
DEPRECATED RDW RBC AUTO: 65.1 FL (ref 37–54)
ERYTHROCYTE [DISTWIDTH] IN BLOOD BY AUTOMATED COUNT: 20.1 % (ref 12.3–15.4)
GRAM STN SPEC: ABNORMAL
HCT VFR BLD AUTO: 26.8 % (ref 34–46.6)
HGB BLD-MCNC: 8.5 G/DL (ref 12–15.9)
ISOLATED FROM: ABNORMAL
MCH RBC QN AUTO: 28.9 PG (ref 26.6–33)
MCHC RBC AUTO-ENTMCNC: 31.7 G/DL (ref 31.5–35.7)
MCV RBC AUTO: 91.2 FL (ref 79–97)
PLATELET # BLD AUTO: 96 10*3/MM3 (ref 140–450)
PMV BLD AUTO: ABNORMAL FL
QT INTERVAL: 358 MS
QTC INTERVAL: 437 MS
RBC # BLD AUTO: 2.94 10*6/MM3 (ref 3.77–5.28)
UNIT  ABO: NORMAL
UNIT  RH: NORMAL
WBC NRBC COR # BLD AUTO: 5 10*3/MM3 (ref 3.4–10.8)

## 2024-08-20 PROCEDURE — 85027 COMPLETE CBC AUTOMATED: CPT | Performed by: FAMILY MEDICINE

## 2024-08-20 PROCEDURE — 25810000003 LACTATED RINGERS PER 1000 ML: Performed by: INTERNAL MEDICINE

## 2024-08-20 RX ORDER — CEFDINIR 300 MG/1
300 CAPSULE ORAL 2 TIMES DAILY
Qty: 10 CAPSULE | Refills: 0 | Status: SHIPPED | OUTPATIENT
Start: 2024-08-20 | End: 2024-08-20

## 2024-08-20 RX ORDER — CEFDINIR 300 MG/1
300 CAPSULE ORAL 2 TIMES DAILY
Qty: 10 CAPSULE | Refills: 0 | Status: SHIPPED | OUTPATIENT
Start: 2024-08-20 | End: 2024-08-27 | Stop reason: SDUPTHER

## 2024-08-20 RX ORDER — NYSTATIN 100000 [USP'U]/G
POWDER TOPICAL EVERY 12 HOURS SCHEDULED
Status: DISCONTINUED | OUTPATIENT
Start: 2024-08-20 | End: 2024-08-20 | Stop reason: HOSPADM

## 2024-08-20 RX ORDER — CLOTRIMAZOLE 10 MG/1
10 LOZENGE ORAL
Qty: 15 TABLET | Refills: 0 | Status: SHIPPED | OUTPATIENT
Start: 2024-08-20 | End: 2024-08-27

## 2024-08-20 RX ADMIN — NYSTATIN: 100000 POWDER TOPICAL at 10:41

## 2024-08-20 RX ADMIN — Medication 10 ML: at 10:42

## 2024-08-20 RX ADMIN — LEFLUNOMIDE 20 MG: 20 TABLET ORAL at 10:41

## 2024-08-20 RX ADMIN — SODIUM CHLORIDE, POTASSIUM CHLORIDE, SODIUM LACTATE AND CALCIUM CHLORIDE 100 ML/HR: 600; 310; 30; 20 INJECTION, SOLUTION INTRAVENOUS at 00:29

## 2024-08-20 RX ADMIN — LEVOTHYROXINE SODIUM 75 MCG: 75 TABLET ORAL at 05:12

## 2024-08-20 RX ADMIN — CARVEDILOL 25 MG: 25 TABLET, FILM COATED ORAL at 10:41

## 2024-08-20 RX ADMIN — ISOSORBIDE MONONITRATE 60 MG: 60 TABLET, EXTENDED RELEASE ORAL at 10:41

## 2024-08-20 RX ADMIN — DULOXETINE HYDROCHLORIDE 60 MG: 30 CAPSULE, DELAYED RELEASE ORAL at 10:41

## 2024-08-20 RX ADMIN — NYSTATIN 500000 UNITS: 100000 SUSPENSION ORAL at 10:41

## 2024-08-20 RX ADMIN — APIXABAN 5 MG: 5 TABLET, FILM COATED ORAL at 10:41

## 2024-08-20 RX ADMIN — PANTOPRAZOLE SODIUM 40 MG: 40 TABLET, DELAYED RELEASE ORAL at 10:41

## 2024-08-20 RX ADMIN — OXYBUTYNIN CHLORIDE 10 MG: 5 TABLET, EXTENDED RELEASE ORAL at 10:41

## 2024-08-20 RX ADMIN — ALLOPURINOL 100 MG: 100 TABLET ORAL at 10:41

## 2024-08-20 NOTE — DISCHARGE SUMMARY
[facility] Medicine Services  DISCHARGE SUMMARY       Date of Admission: 8/16/2024  Date of Discharge:  8/20/2024  Primary Care Physician: Moises Oliveira MD    Discharge Diagnoses:  Active Hospital Problems    Diagnosis     **E. coli UTI (urinary tract infection)     Chronic heart failure with preserved ejection fraction (HFpEF)     PINKY (acute kidney injury)     Positive blood culture, contaminant     Anemia of chronic disease     Sick sinus syndrome     Chronic heart failure with preserved ejection fraction     Chronic anticoagulation     Iron deficiency anemia     Chronic embolism and thrombosis of unspecified deep veins of left lower extremity          Presenting Problem/History of Present Illness:  UTI (urinary tract infection), bacterial [N39.0, A49.9]  UTI (urinary tract infection) [N39.0]  Chronic heart failure with preserved ejection fraction (HFpEF) [I50.32]     Chief Complaint on Day of Discharge:   Complaint    History of Present Illness on Day of Discharge:   The patient remains at her typical cognitive baseline.  She is alert and quite conversant.  Urine culture revealed near pansensitive E. coli and the patient will be transitioned from Rocephin to cefdinir at discharge.  Hemoglobin improved to 8.5 after 1 unit PRBC transfusion yesterday.  Renal function is nearing baseline.  Spironolactone and losartan will be held at discharge until she rechecks with her PCP to assure that renal function has indeed returned to baseline.    Hospital Course  69-year-old female who presents emergency department with fever and confusion.  The patient is unable to give any history.  The patient has a full-time caregiver.  The patient's sister is at bedside and gives history.  They note that the patient has had fever, decreased appetite, and generalized weakness that is worsened over the last 2 days.  She has had history of urinary tract infections in the past.  The patient is  currently nonmobile, but does sit in a wheelchair during the day.   Treatment Plan  [facility]  PT/OT/Speech consult  Fall, skin, aspiration precautions  IVF  Cardiology consult  FU labs in the am    IV fluids were changed from half-normal saline to lactated Ringer's and rate was increased.  Losartan spironolactone were held.  Blood and urine cultures were pending.  Review of recent urine culture from mid June of this year revealed a history of multidrug-resistant organism including Enterobacter cloacae complex.  As a result, ceftriaxone was broadened to cefepime.  Wound care was consulted regarding a chronic wound of her gluteal area and wound VAC maintenance.  On the day after admission, the patient felt much better.  She denies any new pain symptoms.  Confusion and mental status change were significantly improved.  Cognitive state was back to baseline on the day that I assumed care on 8/19.  The patient was quite conversant and recognized me at the time of my visit.  Urine culture revealed nearly pansensitive E. coli.  Cefepime was discontinued in favor of Rocephin.  Blood culture showed a contaminant.  The patient is appropriate for discharge today now that her cognitive status is at baseline.  Creatinine has improved to 1.5.  Losartan and spironolactone will be held until she follows up with her primary care physician for repeat labs to assess renal function and UA to assess resolution of UTI.  The patient is appropriate for discharge today.      Consults:   Cardiology:  Assessment & Plan  Elevated troponin:  Patient, caregivers, and RN deny the patient having any complaints concerning for ACS.  This is likely myocardial injury and not ischemia in the setting of UTI, PINKY, and anemia.  No further cardiac testing is warranted at this time.     Urinary tract infection:  as per primary team     Acute kidney injury:  as per primary team     CAD, sp PCI:  Stable.  Continue Coreg,  "Imdur, and PCSK9 inhibitor.  Patient is on Eliquis due to h/o DVT.     Chronic diastolic heart failure:  Losartan and spironolactone is held d/t PINKY.  Lasix PRN.  Patient is clinically euvolemic.       Sick sinus syndrome, s/p PPM:  Patient is monitored regularly in office.     Essential hypertension:  BP medications held d/t PINKY.     Hyperlipidemia:  PCSK9 inhibitor as OP.     H/o DVT on chronic anticoagulation:  Anticoagulated with Eliquis.     Thank you kindly for the consultation and opportunity to help care for your patient.  Further recommendations will follow from Dr. Holloway.  Please never hesitate to reach out with further questions or concerns.        I discussed the patient's findings and my recommendations with patient, family, nursing staff, and consulting provider     Sloane Anna PA-C    Attestation signed by Omar Hernandez MD at 08/17/24 1910     I have reviewed this documentation and agree.  Elevated troponin reflects acute myocardial injury secondary to other medical issues, and not ACS.     Therefore, no further cardiac testing or treatment changes recommended.  Will sign off, please call back with any other questions.     Electronically signed by Omar Hernandez MD,    Result Review    Result Review:  I have personally reviewed the results from the time of this admission to 8/20/2024 11:09 CDT and agree with these findings:  []  Laboratory  []  Microbiology  []  Radiology  []  EKG/Telemetry   []  Cardiology/Vascular   []  Pathology  []  Old records  []  Other:    Condition on Discharge:    Stable and improved to baseline    Physical Exam on Discharge:  /60 (BP Location: Left arm, Patient Position: Lying)   Pulse 79   Temp 98.3 °F (36.8 °C) (Oral)   Resp 18   Ht 160 cm (62.99\")   Wt 88.9 kg (195 lb 15.8 oz)   LMP  (LMP Unknown)   SpO2 94%   BMI 34.73 kg/m²   Physical Exam       Constitutional:       General: She is not in acute distress.     Comments: She is alert, " conversant and bright.  HENT:      Head: Normocephalic.      Mouth/Throat:      Mouth: Mucous membranes are moist.   Pulmonary:      Effort: Pulmonary effort is normal. No respiratory distress.      Comments: On supp 02 via nasal cannula  Skin:     General: Skin is warm.   Neurological:      Mental Status: She is alert and oriented to person, place, and time.      Comments: Much more alert and at baseline  Psychiatric:         Mood and Affect: Mood normal.     Discharge Disposition:  Home or Self Care    Discharge Medications:     Discharge Medications        New Medications        Instructions Start Date   cefdinir 300 MG capsule  Commonly known as: OMNICEF   300 mg, Oral, 2 Times Daily      clotrimazole 10 MG mel  Commonly known as: MYCELEX   Dissolve 1 in mouth 5 times daily             Continue These Medications        Instructions Start Date   acetaminophen 325 MG tablet  Commonly known as: TYLENOL   2 tablets, Oral, Every 6 Hours PRN      allopurinol 100 MG tablet  Commonly known as: ZYLOPRIM   100 mg, Oral, Daily      apixaban 5 MG tablet tablet  Commonly known as: Eliquis   5 mg, Oral, 2 Times Daily      aspirin 81 MG EC tablet   81 mg, Oral, Daily      carvedilol 25 MG tablet  Commonly known as: COREG   25 mg, Oral, 2 Times Daily With Meals      colchicine 0.6 MG tablet   0.6 mg, Oral, Daily      Diclofenac Sodium 1 % gel gel  Commonly known as: VOLTAREN   4 g, Topical, 4 Times Daily PRN      DULoxetine 60 MG capsule  Commonly known as: CYMBALTA   60 mg, Oral, Daily      estradiol 0.1 MG/GM vaginal cream  Commonly known as: ESTRACE   2 applicators, Vaginal, Daily      Evolocumab solution prefilled syringe injection  Commonly known as: REPATHA   140 mg, Subcutaneous, Every 14 Days      ferrous sulfate 324 (65 Fe) MG tablet delayed-release EC tablet   324 mg, Oral, Daily With Breakfast      folic acid 1 MG tablet  Commonly known as: FOLVITE   1 mg, Oral, Daily      furosemide 40 MG tablet  Commonly known  as: LASIX   1 tablet, Oral, Daily PRN      isosorbide mononitrate 60 MG 24 hr tablet  Commonly known as: IMDUR   60 mg, Oral, Daily      leflunomide 20 MG tablet  Commonly known as: ARAVA   20 mg, Oral, Every Night at Bedtime      levothyroxine 75 MCG tablet  Commonly known as: SYNTHROID, LEVOTHROID   75 mcg, Oral, Daily      lidocaine 5 %  Commonly known as: Lidoderm   1 patch, Transdermal, Daily PRN, Remove & Discard patch within 12 hours or as directed by MD      methenamine 1 g tablet  Commonly known as: HIPREX   1 g, Oral, 2 Times Daily With Meals      miconazole 2 % powder  Commonly known as: MICOTIN   1 Application, Topical, As Needed      multivitamin with minerals tablet tablet   1 tablet, Oral, Daily      nitroglycerin 0.4 MG SL tablet  Commonly known as: NITROSTAT   1 tablet, Sublingual, Every 5 Minutes PRN, Take no more than 3 doses in 15 minutes.      ondansetron ODT 4 MG disintegrating tablet  Commonly known as: ZOFRAN-ODT   4 mg, Translingual, Every 8 Hours PRN      oxybutynin XL 10 MG 24 hr tablet  Commonly known as: Ditropan XL   10 mg, Oral, Daily      pantoprazole 40 MG EC tablet  Commonly known as: Protonix   40 mg, Oral, Daily      predniSONE 5 MG tablet  Commonly known as: DELTASONE   5 mg, Oral, Daily      traMADol 50 MG tablet  Commonly known as: ULTRAM   50 mg, Oral, Every 12 Hours PRN      valACYclovir 500 MG tablet  Commonly known as: VALTREX   500 mg, Oral, Daily      Vitamin C 500 MG capsule   1 tablet, Oral, Daily      sam's amazing butt cream   1 Application, Topical, 4 Times Daily PRN, Recipe Contains: 1:1:1:1 of hydrocortisone 1% oint, bacitracin 500 unit/gram oint, zinc 20% oint, nystatin 100,000 unit/gram oint             Stop These Medications      losartan 50 MG tablet  Commonly known as: COZAAR     spironolactone 25 MG tablet  Commonly known as: ALDACTONE              Discharge Diet:   Diet Instructions       Diet: Regular/House Diet; Regular (IDDSI 7); Thin (IDDSI 0)       Discharge Diet: Regular/House Diet    Texture: Regular (IDDSI 7)    Fluid Consistency: Thin (IDDSI 0)            Discharge Care Plan / Instructions:   Discharge home    Activity at Discharge:   Activity Instructions       Activity as Tolerated              Follow-up Appointments:  Follow-up with PCP next week to recheck renal function and UA and to discuss restarting spironolactone and losartan    Electronically signed by Nayan Hale DO, 08/20/24, 11:09 CDT.    Time: Discharge over 30 min    Part of this note may be an electronic transcription/translation of spoken language to printed text using the Dragon Dictation system.

## 2024-08-20 NOTE — OUTREACH NOTE
Prep Survey      Flowsheet Row Responses   [facility] facility patient discharged from? [facility]   Is LACE score < 7 ? No   Eligibility [facility]   Date of Admission 08/16/24   Discharge Disposition Home-Health Care Svc   Discharge diagnosis E. coli UTI (urinary tract infection   Does the patient have one of the following disease processes/diagnoses(primary or secondary)? Other   Does the patient have Home health ordered? Yes   What is the Home health agency?  [facility]   Is there a DME ordered? No   Prep survey completed? Yes            PAMELA RIDDLE - Registered Nurse

## 2024-08-20 NOTE — CASE MANAGEMENT/SOCIAL WORK
Continued Stay Note  [facility]     Patient Name: Tawny Shin  MRN: [id number]  Today's Date: 8/20/2024    Admit Date: 8/16/2024    Plan: Home and resume [facility]   Discharge Plan       Row Name 08/20/24 1147       Plan    Plan Home and resume [facility]    Patient/Family in Agreement with Plan yes    Plan Comments SW has informed [place] at [facility] of discharge; charge RN is entering resumption orders.    Final Discharge Disposition Code 06 - home with home health care                   Discharge Codes    No documentation.                 Expected Discharge Date and Time       Expected Discharge Date Expected Discharge Time    Aug 20, 2024               KALYN Benitez

## 2024-08-20 NOTE — THERAPY DISCHARGE NOTE
Acute Care - Occupational Therapy Discharge Summary  [facility]     Patient Name: Tawny Shin  : 1955  MRN: [id number]    Today's Date: 2024       Date of Referral to OT: 24  Referring Physician: Jennifer Saxena APRN      Admit Date: 2024        OT Recommendation and Plan    Visit Dx:    ICD-10-CM ICD-9-CM   1. Urinary tract infection, bacterial  N39.0 599.0    A49.9 041.9   2. Demand ischemia  I24.89 411.89   3. Esophageal dysphagia  R13.19 787.29   4. Impaired mobility [Z74.09]  Z74.09 799.89   5. Buttock wound, right, initial encounter [S31.819A]  S31.819A 877.0   6. Paralysis  G83.9 344.9   7. Generalized weakness  R53.1 780.79   8. Cyst of buttocks  L72.9 706.2   9. Near functional paraplegia  G82.20 344.1   10. Pressure injury of skin of right buttock, unspecified injury stage  L89.319 707.05     707.20   11. Chronic pain syndrome  G89.4 338.4                OT Rehab Goals       Row Name 24 1500             Transfer Goal 1 (OT)    Activity/Assistive Device (Transfer Goal 1, OT) sit-to-stand/stand-to-sit;bed-to-chair/chair-to-bed;toilet;shower chair  -MM      [place] Level/Cues Needed (Transfer Goal 1, OT) moderate assist (50-74% patient effort)  -MM      Time Frame (Transfer Goal 1, OT) long term goal (LTG)  -MM      Progress/Outcome (Transfer Goal 1, OT) goal not met  -MM         Bathing Goal 1 (OT)    Activity/Device (Bathing Goal 1, OT) bathing skills, all  -MM      [place] Level/Cues Needed (Bathing Goal 1, OT) minimum assist (75% or more patient effort)  -MM      Time Frame (Bathing Goal 1, OT) long term goal (LTG)  -MM      Progress/Outcomes (Bathing Goal 1, OT) goal not met  -MM         Problem Specific Goal 1 (OT)    Problem Specific Goal 1 (OT) Pt will implement 1 pain 'q to reduce pain & increase functional performance.  -MM      Time Frame (Problem Specific Goal 1, OT) long term goal (LTG)  -MM      Progress/Outcome (Problem Specific  Goal 1, OT) goal not met  -MM                User Key  (r) = Recorded By, (t) = Taken By, (c) = Cosigned By      Initials Name Provider Type Discipline    MM Jair Farrar, OTR/L Occupational Therapist OT                     Outcome Measures       Row Name 08/19/24 1100             How much help from another is currently needed...    Putting on and taking off regular lower body clothing? 1  -EC      Bathing (including washing, rinsing, and drying) 2  -EC      Toileting (which includes using toilet bed pan or urinal) 1  -EC      Putting on and taking off regular upper body clothing 2  -EC      Taking care of personal grooming (such as brushing teeth) 3  -EC      Eating meals 3  -EC      AM-PAC 6 Clicks Score (OT) 12  -EC         Functional Assessment    Outcome Measure Options AM-PAC 6 Clicks Daily Activity (OT)  -EC                User Key  (r) = Recorded By, (t) = Taken By, (c) = Cosigned By      Initials Name Provider Type    EC Ashley Martino OTR/L Occupational Therapist                            OT Discharge Summary  Anticipated Discharge Disposition (OT): home with 24/7 care, home with home health  Reason for Discharge: Discharge from facility  Outcomes Achieved: Refer to plan of care for updates on goals achieved  Discharge Destination: Home with home health, other (comment) (home with 24/7 care)      KATHYA Peralta/KARUNA  8/20/2024

## 2024-08-20 NOTE — PLAN OF CARE
Goal Outcome Evaluation:  Plan of Care Reviewed With: patient, family        Progress: no change  Outcome Evaluation: Patient oriented x2 with VSS. No c/o pain. Rest has been decent. No major changes during the shift.

## 2024-08-21 ENCOUNTER — TRANSITIONAL CARE MANAGEMENT TELEPHONE ENCOUNTER (OUTPATIENT)
Dept: CALL CENTER | Facility: HOSPITAL | Age: 69
End: 2024-08-21
Payer: MEDICARE

## 2024-08-21 ENCOUNTER — HOME CARE VISIT (OUTPATIENT)
Dept: HOME HEALTH SERVICES | Facility: CLINIC | Age: 69
End: 2024-08-21
Payer: MEDICARE

## 2024-08-21 LAB
BACTERIA SPEC AEROBE CULT: ABNORMAL
BACTERIA SPEC AEROBE CULT: ABNORMAL
BACTERIA SPEC AEROBE CULT: NORMAL

## 2024-08-21 PROCEDURE — G0299 HHS/HOSPICE OF RN EA 15 MIN: HCPCS

## 2024-08-21 RX ORDER — PANTOPRAZOLE SODIUM 40 MG/1
40 TABLET, DELAYED RELEASE ORAL DAILY
Qty: 90 TABLET | Refills: 0 | Status: SHIPPED | OUTPATIENT
Start: 2024-08-21 | End: 2024-11-19

## 2024-08-21 NOTE — TELEPHONE ENCOUNTER
Caller: Ashmore, Laura Otoole    Relationship: Self    Best call back number: [phone]     Requested Prescriptions:   Requested Prescriptions     Pending Prescriptions Disp Refills    pantoprazole (Protonix) 40 MG EC tablet 90 tablet 0     Sig: Take 1 tablet by mouth Daily for 90 days. Indications: Gastroesophageal Reflux Disease        Pharmacy where request should be sent: [facility] [address] [place] [address] - [address] AT [facility] - [phone] [facility] [phone]      Last office visit with prescribing clinician: 6/10/2024   Last telemedicine visit with prescribing clinician: Visit date not found   Next office visit with prescribing clinician: 11/7/2024     Additional details provided by patient: PATIENT IS TOTALLY OUT AND IS GETTING SICK AND NEEDS THEM. HOSPITAL WAS SUPPOSED TO BE SENDING SOME HOME WITH HER BUT THEY FORGOT TO GIVE THEM TO LA[facility]A    Does the patient have less than a 3 day supply:  [x] Yes  [] No    Would you like a call back once the refill request has been completed: [] Yes [x] No    If the office needs to give you a call back, can they leave a voicemail: [] Yes [x] No    Seferino Box Rep   08/21/24 12:07 CDT

## 2024-08-21 NOTE — THERAPY DISCHARGE NOTE
Acute Care - Physical Therapy Discharge Summary  [facility]       Patient Name: Tawny Shin  : 1955  MRN: [id number]    Today's Date: 2024          Referring Physician: Jennifer Saxena APRN      Admit Date: 2024      PT Recommendation and Plan    Visit Dx:    ICD-10-CM ICD-9-CM   1. Urinary tract infection, bacterial  N39.0 599.0    A49.9 041.9   2. Demand ischemia  I24.89 411.89   3. Esophageal dysphagia  R13.19 787.29   4. Impaired mobility [Z74.09]  Z74.09 799.89   5. Buttock wound, right, initial encounter [S31.819A]  S31.819A 877.0   6. Paralysis  G83.9 344.9   7. Generalized weakness  R53.1 780.79   8. Cyst of buttocks  L72.9 706.2   9. Near functional paraplegia  G82.20 344.1   10. Pressure injury of skin of right buttock, unspecified injury stage  L89.319 707.05     707.20   11. Chronic pain syndrome  G89.4 338.4        Outcome Measures       Row Name 24 1100             How much help from another is currently needed...    Putting on and taking off regular lower body clothing? 1  -EC      Bathing (including washing, rinsing, and drying) 2  -EC      Toileting (which includes using toilet bed pan or urinal) 1  -EC      Putting on and taking off regular upper body clothing 2  -EC      Taking care of personal grooming (such as brushing teeth) 3  -EC      Eating meals 3  -EC      AM-PAC 6 Clicks Score (OT) 12  -EC         Functional Assessment    Outcome Measure Options AM-PAC 6 Clicks Daily Activity (OT)  -EC                User Key  (r) = Recorded By, (t) = Taken By, (c) = Cosigned By      Initials Name Provider Type    EC Ashley Martino OTR/L Occupational Therapist                         PT Rehab Goals       Row Name 24 0900             Bed Mobility Goal 1 (PT)    Activity/Assistive Device (Bed Mobility Goal 1, PT) rolling to left;rolling to right;sit to supine;supine to sit  -AB      [place] Level/Cues Needed (Bed Mobility Goal 1, PT) minimum assist (75% or more  patient effort)  -AB      Time Frame (Bed Mobility Goal 1, PT) long term goal (LTG);10 days  -AB      Progress/Outcomes (Bed Mobility Goal 1, PT) goal not met  -AB         Transfer Goal 1 (PT)    Activity/Assistive Device (Transfer Goal 1, PT) sit-to-stand/stand-to-sit;bed-to-chair/chair-to-bed;wheelchair transfer  -AB      [place] Level/Cues Needed (Transfer Goal 1, PT) moderate assist (50-74% patient effort)  -AB      Time Frame (Transfer Goal 1, PT) long term goal (LTG);10 days  -AB      Progress/Outcome (Transfer Goal 1, PT) goal not met  -AB         Gait Training Goal 1 (PT)    Activity/Assistive Device (Gait Training Goal 1, PT) gait (walking locomotion);decrease asymmetrical patterns;decrease fall risk;diminish gait deviation;forward stepping;improve balance and speed;increase endurance/gait distance;increase energy conservation;assistive device use;walker, rolling  -AB      [place] Level (Gait Training Goal 1, PT) moderate assist (50-74% patient effort)  -AB      Distance (Gait Training Goal 1, PT) 10 as needed for home and bathroom safety  -AB      Time Frame (Gait Training Goal 1, PT) long term goal (LTG);10 days  -AB      Progress/Outcome (Gait Training Goal 1, PT) goal not met  -AB         Wound Care Goal 1 (PT)    Wound Care Goal 1 (PT) Pt will demo decreased wound length from 1.7 to 1 cm as needed for improvement  -AB      Time Frame (Wound Care Goal 1, PT) long term goal (LTG);10 days  -AB      Progress/Outcome (Wound Care Goal 1, PT) goal not met  -AB         Wound Care Goal 2 (PT)    Wound Care Goal 2 (PT) Pt will have decreased depth of wound from 1.7 cm to 1 cm  -AB      Time Frame (Wound Care Goal 2, PT) long term goal (LTG);10 days  -AB      Progress/Outcome (Wound Care Goal 2, PT) goal not met  -AB         Patient Education Goal (PT)    Activity (Patient Education Goal, PT) Pt and caregiver will report any abnormal alarms or change in excessive drainage in R glute wound bed to nursing  staff as needed for possible wound vac exchange  -AB      [place]/Cues/Accuracy (Memory Goal 2, PT) demonstrates adequately;independent;verbalizes understanding  -AB      Time Frame (Patient Education Goal, PT) long term goal (LTG);10 days  -AB      Progress/Outcome (Patient Education Goal, PT) goal not met  -AB                User Key  (r) = Recorded By, (t) = Taken By, (c) = Cosigned By      Initials Name Provider Type Discipline    Kasie Carlos, PTA Physical Therapist Assistant PT                        PT Discharge Summary  Anticipated Discharge Disposition (PT): home with 24/7 care, home with home health  Reason for Discharge: Discharge from facility  Outcomes Achieved: Refer to plan of care for updates on goals achieved  Discharge Destination: Home with home health      Kasie Perkins PTA   8/21/2024

## 2024-08-21 NOTE — Clinical Note
Resumption of Care Note: DARIA post hospital stay due to UTI due to ecoli. Pt alert and pleasant. Lives at home with full time caregivers. Pt is wheelchair bound and transferes with johan. Pt continues to have a wound on right buttock. Pt has a patent wound vac at 125mm with a small amt of cherry colored drainage in cannister.     Reason for hospitalization/new problems: UTI    DARIA [place] Findings:Alert and pleasant. Slightly forgetful Sitting in chair. Denies new concerns. Lungs decreased in bases. Caregiver present. Wd vac functioning at 125mm. Patient hospitalized from 8/16 to 8/20 for a uti.    New/changed medications: Omnicef 300mg po po bid    New/changed orders: Wound vac orders to continue. Home health to change 2 x week and patient to go to wound care weekly.    Educated on Emergency Plan, steps to take prior to going to the ER and when to Call [facility]:  Instructed to call home health prior to ER unless an emergency occurs.    Plan/Focus of Care and Skilled need: UTI. Patient to have sn for home management of uti and wound. PT and OT for safety and strengtheninResumption of Care Note: DARIA post hospital stay due to UTI due to ecoli. Pt alert and pleasant. Lives at home with full time caregivers. Pt is wheelchair bound and transferes with johan. Pt continues to have a wound on right buttock. Pt has a patent wound vac at 125mm with a small amt of cherry colored drainage in cannister.     Reason for hospitalization/new problems: UTI    DARIA [place] Findings:Alert and pleasant. Slightly forgetful Sitting in chair. Denies new concerns. Lungs decreased in bases. Caregiver present. Wd vac functioning at 125mm. Patient hospitalized from 8/16 to 8/20 for a uti.    New/changed medications: Omnicef 300mg po po bid    New/changed orders: Wound vac orders to continue. Home health to change 2 x week and patient to go to wound care weekly.    Educated on Emergency Plan, steps to take prior to going to the ER and when  to Call [facility]:  Instructed to call home health prior to ER unless an emergency occurs.    Plan/Focus of Care and Skilled need: UTI. Patient to have sn for home management of uti and wound. PT and OT for safety and strengthenin

## 2024-08-21 NOTE — OUTREACH NOTE
Call Center TCM Note      Flowsheet Row Responses   [facility] patient discharged from? [place]   Does the patient have one of the following disease processes/diagnoses(primary or secondary)? Other   TCM attempt successful? No   Unsuccessful attempts Attempt 2            Juanis Garcia LPN    8/21/2024, 15:07 CDT

## 2024-08-21 NOTE — THERAPY DISCHARGE NOTE
Acute Care - Speech Language Pathology Discharge Summary  [facility]       Patient Name: Tawny Shin  : 1955  MRN: [id number]    Today's Date: 2024          Referring Physician: Jennifer Saxena APRN        Admit Date: 2024      SLP Recommendation and Plan  Regular diet with thin liquids.     Visit Dx:    ICD-10-CM ICD-9-CM   1. Urinary tract infection, bacterial  N39.0 599.0    A49.9 041.9   2. Demand ischemia  I24.89 411.89   3. Esophageal dysphagia  R13.19 787.29   4. Impaired mobility [Z74.09]  Z74.09 799.89   5. Buttock wound, right, initial encounter [S31.819A]  S31.819A 877.0   6. Paralysis  G83.9 344.9   7. Generalized weakness  R53.1 780.79   8. Cyst of buttocks  L72.9 706.2   9. Near functional paraplegia  G82.20 344.1   10. Pressure injury of skin of right buttock, unspecified injury stage  L89.319 707.05     707.20   11. Chronic pain syndrome  G89.4 338.4                SLP GOALS       Row Name 24 0900 24 0835          (LTG) Swallow    (LTG) Swallow Patient will tolerate least restrictive diet without overt s/s of aspiration.  -MG Patient will tolerate least restrictive diet without overt s/s of aspiration.  -MD     [place] (Swallow Long Term Goal) independently (over 90% accuracy)  -MG independently (over 90% accuracy)  -MD     Time Frame (Swallow Long Term Goal) by discharge  -MG by discharge  -MD     Barriers (Swallow Long Term Goal) none  -MG none  -MD     Progress/Outcomes (Swallow Long Term Goal) discharged from facility;goal partially met  -MG good progress toward goal  -MD        (STG) Patient will tolerate trials of    Consistencies Trialed ([place] trials) regular textures;thin liquids  -MG regular textures;thin liquids  -MD     Desired Outcome ([place] trials) without signs/symptoms of aspiration;without signs of distress;with adequate oral prep/transit/clearance  -MG without signs/symptoms of aspiration;without signs of distress;with adequate oral  prep/transit/clearance  -MD     [place] ([place]olerate trials) independently (over 90% accuracy)  -MG independently (over 90% accuracy)  -MD     Time Frame ([place]erate trials) by discharge  -MG by discharge  -MD     Progress/Outcomes ([place] trials) discharged from facility;goal partially met  -MG good progress toward goal  -MD        (STG) Swallow Compensatory Strategies Goal 1 (SLP)    Activity (Swallow Compensatory Strategies/Techniques Goal 1, SLP) reflux precautions;compensatory strategies;postural techniques;during meal intake  -MG reflux precautions;compensatory strategies;postural techniques;during meal intake  -MD     [place]/Accuracy (Swallow Compensatory Strategies/Techniques Goal 1, SLP) independently (over 90% accuracy)  -MG independently (over 90% accuracy)  -MD     Time Frame (Swallow Compensatory Strategies/Techniques Goal 1, SLP) by discharge  -MG by discharge  -MD     Barriers (Swallow Compensatory Strategies/Techniques Goal 1, SLP) none  -MG none  -MD     Progress/Outcomes (Swallow Compensatory Strategies/Techniques Goal 1, SLP) discharged from facility;goal partially met  -MG good progress toward goal  -MD               User Key  (r) = Recorded By, (t) = Taken By, (c) = Cosigned By      Initials Name Provider Type    Nicole Gee MS CCC-SLP Speech and Language Pathologist    Skye Tinajero, SLP Speech and Language Pathologist                    [email or web address]@      SLP Discharge Summary  Anticipated Discharge Disposition (SLP): unknown  Reason for Discharge: discharge from this facility  Progress Toward Achieving Short/long Term Goals: goals partially met within established timelines  Discharge Destination: home      Nicole Amin MS CCC-SLP  8/21/2024

## 2024-08-21 NOTE — OUTREACH NOTE
Call Center TCM Note      Flowsheet Row Responses   [facility] patient discharged from? [place]   Does the patient have one of the following disease processes/diagnoses(primary or secondary)? Other   TCM attempt successful? No   Unsuccessful attempts Attempt 1            Juanis Garcia LPN    8/21/2024, 14:14 CDT

## 2024-08-22 ENCOUNTER — TRANSITIONAL CARE MANAGEMENT TELEPHONE ENCOUNTER (OUTPATIENT)
Dept: CALL CENTER | Facility: HOSPITAL | Age: 69
End: 2024-08-22
Payer: MEDICARE

## 2024-08-22 ENCOUNTER — HOME CARE VISIT (OUTPATIENT)
Dept: HOME HEALTH SERVICES | Facility: HOME HEALTHCARE | Age: 69
End: 2024-08-22
Payer: MEDICARE

## 2024-08-22 ENCOUNTER — HOME CARE VISIT (OUTPATIENT)
Dept: HOME HEALTH SERVICES | Facility: CLINIC | Age: 69
End: 2024-08-22
Payer: MEDICARE

## 2024-08-22 VITALS
RESPIRATION RATE: 18 BRPM | DIASTOLIC BLOOD PRESSURE: 70 MMHG | SYSTOLIC BLOOD PRESSURE: 125 MMHG | TEMPERATURE: 97.3 F | HEART RATE: 77 BPM | OXYGEN SATURATION: 94 %

## 2024-08-22 PROCEDURE — G0152 HHCP-SERV OF OT,EA 15 MIN: HCPCS

## 2024-08-22 NOTE — HOME HEALTH
SUBJECTIVE: patient reports increased weakness, no longer able to stand  DX: Infection following a procedure, superficial incisional surgical site, subsequent encounter [  PMH:patient rehospitalized with UTI  PRECAUTIONS:  fall precautions  OXYGEN USE: no  EQUIPMENT: motorized w/c, mechanical lift, shower chair, hospital bed, grab bars, BSC  PRIOR LEVEL OF FUNCTION: lives alone, had 24 hour caregivers  MEDICAL NECESSITY: skilled OT needed to increase safety and ability to assist with ADLs, increase strength for ADLs/ADL transfers  PATIENT GOALS: to be able to transfer without mechanical lift  COMMUNICATION / CARE COORDINATION:  with admitting RN  CURRENT INSURANCE: Medicare  DATE OF NEXT APPOINTMENT WITH DOCTOR:  27th  1:30 Dr. Lacy Colmenares  ANTICIPATED DISCHARGE PLAN: to self/caregivers when goals met  PATIENT/CAREGIVER AGREE WITH DISCHARGE PLAN: yes  SPECIFIC INTERVENTIONS AND GOALS TO ADDRESS ON NEXT VISIT: UE ther-ex, HEP training  FREQUENCY AND DURATION: 1 wk 4       PATIENT HAS RECEIVED EDUCATION ON COVID-19, PREVENTION, HANDWASHING, SOCIAL DISTANCING PER CDC

## 2024-08-22 NOTE — OUTREACH NOTE
Call Center TCM Note      Flowsheet Row Responses   [facility] patient discharged from? [place]   Does the patient have one of the following disease processes/diagnoses(primary or secondary)? Other   TCM attempt successful? No   Unsuccessful attempts Attempt 3            Sydnie Blake RN    8/22/2024, 09:32 CDT

## 2024-08-23 ENCOUNTER — HOME CARE VISIT (OUTPATIENT)
Dept: HOME HEALTH SERVICES | Facility: CLINIC | Age: 69
End: 2024-08-23
Payer: MEDICARE

## 2024-08-23 PROCEDURE — G0299 HHS/HOSPICE OF RN EA 15 MIN: HCPCS

## 2024-08-23 NOTE — HOME HEALTH
Resumption of Care Note: DARIA post hospital stay due to UTI due to ecoli. Pt alert and pleasant. Lives at home with full time caregivers. Pt is wheelchair bound and transferes with johan. Pt continues to have a wound on right buttock. Pt has a patent wound vac at 125mm with a small amt of cherry colored drainage in cannister.     Reason for hospitalization/new problems: UTI    DARIA [place] Findings:Alert and pleasant. Slightly forgetful Sitting in chair. Denies new concerns. Lungs decreased in bases. Caregiver present. Wd vac functioning at 125mm. Patient hospitalized from 8/16 to 8/20 for a uti.    New/changed medications: Omnicef 300mg po po bid    New/changed orders: Wound vac orders to continue. Home health to change 2 x week and patient to go to wound care weekly.    Educated on Emergency Plan, steps to take prior to going to the ER and when to Call [facility]:  Instructed to call home health prior to ER unless an emergency occurs.    Plan/Focus of Care and Skilled need: UTI. Patient to have sn for home management of uti and wound. PT and OT for safety and strengthening.

## 2024-08-24 VITALS
HEART RATE: 76 BPM | SYSTOLIC BLOOD PRESSURE: 142 MMHG | RESPIRATION RATE: 18 BRPM | OXYGEN SATURATION: 98 % | DIASTOLIC BLOOD PRESSURE: 80 MMHG | TEMPERATURE: 98 F

## 2024-08-25 VITALS
OXYGEN SATURATION: 96 % | DIASTOLIC BLOOD PRESSURE: 70 MMHG | HEART RATE: 75 BPM | SYSTOLIC BLOOD PRESSURE: 128 MMHG | RESPIRATION RATE: 18 BRPM | TEMPERATURE: 98 F

## 2024-08-26 ENCOUNTER — HOME CARE VISIT (OUTPATIENT)
Dept: HOME HEALTH SERVICES | Facility: CLINIC | Age: 69
End: 2024-08-26
Payer: MEDICARE

## 2024-08-26 VITALS
OXYGEN SATURATION: 92 % | TEMPERATURE: 96.9 F | HEART RATE: 74 BPM | RESPIRATION RATE: 14 BRPM | DIASTOLIC BLOOD PRESSURE: 60 MMHG | SYSTOLIC BLOOD PRESSURE: 142 MMHG

## 2024-08-26 VITALS
OXYGEN SATURATION: 91 % | RESPIRATION RATE: 16 BRPM | DIASTOLIC BLOOD PRESSURE: 60 MMHG | HEART RATE: 78 BPM | TEMPERATURE: 96.3 F | SYSTOLIC BLOOD PRESSURE: 120 MMHG

## 2024-08-26 PROCEDURE — G0151 HHCP-SERV OF PT,EA 15 MIN: HCPCS

## 2024-08-26 PROCEDURE — G0300 HHS/HOSPICE OF LPN EA 15 MIN: HCPCS

## 2024-08-27 ENCOUNTER — OFFICE VISIT (OUTPATIENT)
Dept: INTERNAL MEDICINE | Facility: CLINIC | Age: 69
End: 2024-08-27
Payer: MEDICARE

## 2024-08-27 VITALS
DIASTOLIC BLOOD PRESSURE: 50 MMHG | TEMPERATURE: 97.5 F | HEART RATE: 73 BPM | OXYGEN SATURATION: 91 % | SYSTOLIC BLOOD PRESSURE: 110 MMHG

## 2024-08-27 DIAGNOSIS — F44.4 FUNCTIONAL NEUROLOGICAL SYMPTOM DISORDER WITH WEAKNESS OR PARALYSIS: ICD-10-CM

## 2024-08-27 DIAGNOSIS — M06.9 RHEUMATOID ARTHRITIS INVOLVING MULTIPLE SITES, UNSPECIFIED WHETHER RHEUMATOID FACTOR PRESENT: ICD-10-CM

## 2024-08-27 DIAGNOSIS — D63.8 ANEMIA OF CHRONIC DISEASE: ICD-10-CM

## 2024-08-27 DIAGNOSIS — L89.319 PRESSURE INJURY OF SKIN OF RIGHT BUTTOCK, UNSPECIFIED INJURY STAGE: Chronic | ICD-10-CM

## 2024-08-27 DIAGNOSIS — Z87.440 HISTORY OF RECURRENT UTIS: ICD-10-CM

## 2024-08-27 DIAGNOSIS — G89.4 CHRONIC PAIN SYNDROME: ICD-10-CM

## 2024-08-27 DIAGNOSIS — G82.20 LOWER PARAPLEGIA: ICD-10-CM

## 2024-08-27 DIAGNOSIS — I50.32 CHRONIC HEART FAILURE WITH PRESERVED EJECTION FRACTION (HFPEF): ICD-10-CM

## 2024-08-27 DIAGNOSIS — N18.32 STAGE 3B CHRONIC KIDNEY DISEASE: ICD-10-CM

## 2024-08-27 DIAGNOSIS — B99.9 RECURRENT INFECTIONS: Primary | ICD-10-CM

## 2024-08-27 PROCEDURE — 1160F RVW MEDS BY RX/DR IN RCRD: CPT | Performed by: INTERNAL MEDICINE

## 2024-08-27 PROCEDURE — 99495 TRANSJ CARE MGMT MOD F2F 14D: CPT | Performed by: INTERNAL MEDICINE

## 2024-08-27 PROCEDURE — 1159F MED LIST DOCD IN RCRD: CPT | Performed by: INTERNAL MEDICINE

## 2024-08-27 PROCEDURE — 3074F SYST BP LT 130 MM HG: CPT | Performed by: INTERNAL MEDICINE

## 2024-08-27 PROCEDURE — 3078F DIAST BP <80 MM HG: CPT | Performed by: INTERNAL MEDICINE

## 2024-08-27 PROCEDURE — 1126F AMNT PAIN NOTED NONE PRSNT: CPT | Performed by: INTERNAL MEDICINE

## 2024-08-27 PROCEDURE — 1111F DSCHRG MED/CURRENT MED MERGE: CPT | Performed by: INTERNAL MEDICINE

## 2024-08-27 NOTE — PROGRESS NOTES
"    CC: hospital follow-up for UTI    History:  Tawny Shin is a 69 y.o. female who presents today for transitional care management after hospitalization.    Date of Discharge: 8/20/2024  Three attempts made, but not successful.  Per TCM 3 attempts being made are adequate.    Discharge summary:  \"History of Present Illness on Day of Discharge:   The patient remains at her typical cognitive baseline.  She is alert and quite conversant.  Urine culture revealed near pansensitive E. coli and the patient will be transitioned from Rocephin to cefdinir at discharge.  Hemoglobin improved to 8.5 after 1 unit PRBC transfusion yesterday.  Renal function is nearing baseline.  Spironolactone and losartan will be held at discharge until she rechecks with her PCP to assure that renal function has indeed returned to baseline.     Hospital Course  69-year-old female who presents emergency department with fever and confusion.  The patient is unable to give any history.  The patient has a full-time caregiver.  The patient's sister is at bedside and gives history.  They note that the patient has had fever, decreased appetite, and generalized weakness that is worsened over the last 2 days.  She has had history of urinary tract infections in the past.  The patient is currently nonmobile, but does sit in a wheelchair during the day.   Treatment Plan  [facility]  PT/OT/Speech consult  Fall, skin, aspiration precautions  IVF  Cardiology consult  FU labs in the am     IV fluids were changed from half-normal saline to lactated Ringer's and rate was increased.  Losartan spironolactone were held.  Blood and urine cultures were pending.  Review of recent urine culture from mid June of this year revealed a history of multidrug-resistant organism including Enterobacter cloacae complex.  As a result, ceftriaxone was broadened to cefepime.  Wound care was consulted regarding a chronic wound of her gluteal area " and wound VAC maintenance.  On the day after admission, the patient felt much better.  She denies any new pain symptoms.  Confusion and mental status change were significantly improved.  Cognitive state was back to baseline on the day that I assumed care on 8/19.  The patient was quite conversant and recognized me at the time of my visit.  Urine culture revealed nearly pansensitive E. coli.  Cefepime was discontinued in favor of Rocephin.  Blood culture showed a contaminant.  The patient is appropriate for discharge today now that her cognitive status is at baseline.  Creatinine has improved to 1.5.  Losartan and spironolactone will be held until she follows up with her primary care physician for repeat labs to assess renal function and UA to assess resolution of UTI.  The patient is appropriate for discharge today.        Consults:   Cardiology:  Assessment & Plan  Elevated troponin:  Patient, caregivers, and RN deny the patient having any complaints concerning for ACS.  This is likely myocardial injury and not ischemia in the setting of UTI, PINKY, and anemia.  No further cardiac testing is warranted at this time.     Urinary tract infection:  as per primary team     Acute kidney injury:  as per primary team     CAD, sp PCI:  Stable.  Continue Coreg, Imdur, and PCSK9 inhibitor.  Patient is on Eliquis due to h/o DVT.     Chronic diastolic heart failure:  Losartan and spironolactone is held d/t PINKY.  Lasix PRN.  Patient is clinically euvolemic.       Sick sinus syndrome, s/p PPM:  Patient is monitored regularly in office.     Essential hypertension:  BP medications held d/t PINKY.     Hyperlipidemia:  PCSK9 inhibitor as OP.     H/o DVT on chronic anticoagulation:  Anticoagulated with Eliquis.     Thank you kindly for the consultation and opportunity to help care for your patient.  Further recommendations will follow from Dr. Holloway.  Please never hesitate to reach out with further questions or concerns.        I  "discussed the patient's findings and my recommendations with patient, family, nursing staff, and consulting provider     Sloane Anna PA-C     Attestation signed by Omar Hernandez MD at 08/17/24 1910     I have reviewed this documentation and agree.  Elevated troponin reflects acute myocardial injury secondary to other medical issues, and not ACS.     Therefore, no further cardiac testing or treatment changes recommended.  Will sign off, please call back with any other questions.\"        ROS:  Review of Systems    Ms. Shin  reports that she has never smoked. She has never been exposed to tobacco smoke. She has never used smokeless tobacco. She reports that she does not drink alcohol and does not use drugs.      Current Outpatient Medications:     acetaminophen (TYLENOL) 325 MG tablet, Take 2 tablets by mouth Every 6 (Six) Hours As Needed for Mild Pain (mild pain). Indications: Fever, Pain, Disp: , Rfl:     apixaban (Eliquis) 5 MG tablet tablet, Take 1 tablet by mouth 2 (Two) Times a Day., Disp: , Rfl:     aspirin 81 MG EC tablet, Take 1 tablet by mouth Daily. Indications: Buildup of Plaques in Large Arteries Leading to the Brain, Disp: , Rfl:     carvedilol (COREG) 25 MG tablet, Take 1 tablet by mouth 2 (Two) Times a Day With Meals. Indications: High Blood Pressure Disorder, Disp: , Rfl:     cefdinir (OMNICEF) 300 MG capsule, Take 1 capsule by mouth 2 (Two) Times a Day. x 5 days  Indications: Bacteria in the Blood, Disp: , Rfl:     clotrimazole (MYCELEX) 10 MG mel, Take 1 tablet by mouth 5 (Five) Times a Day. Indications: Candidiasis Fungal Infection of the Oropharynx, Disp: , Rfl:     colchicine 0.6 MG tablet, Take 1 tablet by mouth Daily. Indications: Gout, Disp: , Rfl:     Diclofenac Sodium (VOLTAREN) 1 % gel gel, Apply 4 g topically to the appropriate area as directed 4 (Four) Times a Day As Needed (Mild pain). Indications: Joint Damage causing Pain and Loss of Function, Disp: , Rfl:     " DULoxetine (CYMBALTA) 60 MG capsule, Take 1 capsule by mouth Daily. Indications: Musculoskeletal Pain, Disp: , Rfl:     estradiol (ESTRACE) 0.1 MG/GM vaginal cream, Insert 2 g into the vagina Daily. (Patient taking differently: Insert 2 g into the vagina 2 (Two) Times a Week. Indications: Bladder Inflammation), Disp: 42.5 g, Rfl: 0    folic acid (FOLVITE) 1 MG tablet, Take 1 tablet by mouth Daily. Indications: Anemia From Inadequate Folic Acid, Disp: , Rfl:     furosemide (LASIX) 40 MG tablet, Take 1 tablet by mouth Daily As Needed (shortness of breath). Indications: Cardiac Failure, Edema, Disp: , Rfl:     Hydrocortisone (sam's amazing butt) cream, Apply 1 Application topically to the appropriate area as directed 4 (Four) Times a Day As Needed (at affected area). Recipe Contains: 1:1:1:1 of hydrocortisone 1% oint, bacitracin 500 unit/gram oint, zinc 20% oint, nystatin 100,000 unit/gram oint, Disp: , Rfl:     leflunomide (ARAVA) 20 MG tablet, Take 1 tablet by mouth every night at bedtime. Indications: Rheumatoid Arthritis, Disp: 90 tablet, Rfl: 3    levothyroxine (SYNTHROID, LEVOTHROID) 75 MCG tablet, Take 1 tablet by mouth Daily. Indications: Underactive Thyroid, Disp: , Rfl:     lidocaine (Lidoderm) 5 %, Place 1 patch on the skin as directed by provider Daily As Needed for Mild Pain. Remove & Discard patch within 12 hours or as directed by MD, Disp: 30 each, Rfl: 2    methenamine (HIPREX) 1 g tablet, Take 1 tablet by mouth 2 (Two) Times a Day With Meals. Indications: Urinary Tract Infection, Disp: , Rfl:     miconazole (MICOTIN) 2 % powder, Apply 1 Application topically to the appropriate area as directed As Needed. Indications: Ringworm of Groin Area, Disp: , Rfl:     multivitamin with minerals tablet tablet, Take 1 tablet by mouth Daily., Disp:  , Rfl:     nitroglycerin (NITROSTAT) 0.4 MG SL tablet, Place 1 tablet under the tongue Every 5 (Five) Minutes As Needed for Chest Pain. Take no more than 3 doses in 15  minutes.  Indications: Acute Angina Pectoris, Disp: , Rfl:     ondansetron ODT (ZOFRAN-ODT) 4 MG disintegrating tablet, Place 1 tablet on the tongue Every 8 (Eight) Hours As Needed for Nausea or Vomiting., Disp: 30 tablet, Rfl: 1    oxybutynin XL (Ditropan XL) 10 MG 24 hr tablet, Take 1 tablet by mouth Daily., Disp: 30 tablet, Rfl: 11    pantoprazole (Protonix) 40 MG EC tablet, Take 1 tablet by mouth Daily for 90 days. Indications: Gastroesophageal Reflux Disease, Disp: 90 tablet, Rfl: 0    predniSONE (DELTASONE) 5 MG tablet, Take 1 tablet by mouth Daily. Indications: Acute Joint Inflammation in Gout, Disp: , Rfl:     traMADol (ULTRAM) 50 MG tablet, Take 1 tablet by mouth Every 12 (Twelve) Hours As Needed for Moderate Pain. Indications: Pain, Disp: , Rfl:       OBJECTIVE:  /50 (BP Location: Left arm, Patient Position: Sitting, Cuff Size: Adult)   Pulse 73   Temp 97.5 °F (36.4 °C) (Temporal)   LMP  (LMP Unknown)   SpO2 91%    Physical Exam  Vitals and nursing note reviewed.   Constitutional:       Appearance: She is obese. She is not ill-appearing.   Eyes:      General: No scleral icterus.     Conjunctiva/sclera: Conjunctivae normal.   Pulmonary:      Effort: Pulmonary effort is normal. No respiratory distress.   Skin:     General: Skin is warm.      Coloration: Skin is not pale.   Neurological:      General: No focal deficit present.      Mental Status: She is alert and oriented to person, place, and time.      Comments: Patient is oriented but seems slower to answer questions at times   Psychiatric:         Mood and Affect: Mood normal.         Behavior: Behavior normal.              Assessment/Plan    Diagnoses and all orders for this visit:    1. Recurrent infections (Primary)  -     Ambulatory Referral to Home Hospice    2. History of recurrent UTIs  -     Ambulatory Referral to Home Hospice    3. Rheumatoid arthritis involving multiple sites, unspecified whether rheumatoid factor present  -      Ambulatory Referral to Home Hospice    4. Chronic pain syndrome  -     Ambulatory Referral to Home Hospice    5. Near functional paraplegia  -     Ambulatory Referral to Home Hospice    6. Pressure injury of skin of right buttock, unspecified injury stage  -     Ambulatory Referral to Home Hospice    7. Stage 3b chronic kidney disease  -     Ambulatory Referral to Home Hospice    8. Chronic heart failure with preserved ejection fraction (HFpEF)  -     Ambulatory Referral to Home Hospice    9. Anemia of chronic disease  -     Ambulatory Referral to Home Hospice    10. Functional neurological symptom disorder with weakness or paralysis    Present for today's discuss is Dr. Casa Shin (patient's son) and Mackenzie Light (patient's daughter) and of course Mrs. Hector Kip.  Patient has had 6 hospitalizations in 2024 and numerous in 2021 and 2022.  I have cared for her for several years both in the hospital and in clinic since transitioning to primary care.  I talked to the patient's son prior to the visit as the patient has not been bouncing back recently both physically and cognitively.  I have witnessed over the last several years a fairly significantly decline, and in recent months not quite making progress back to previous level of functioning.  Patient has caregivers, Cate and Andrew who provide a lot of care for her, but they have got to where they are even having a lot of difficulty and having to call for assistance more routinely.  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  These infections include UTIs, skin infections, previous bone infections.  Patient has the propensity from getting sick quickly and ending up septic with changes in mentation with encephalopathy.  Given patient's chronic medical problems and continued decline, we had a lengthy discussion about the patient's future care.      Patient has expressed to me previously that she  wanted me to be upfront about how she was doing as well as she has voiced the desire to avoid the hospital in the past.  After hospitalizations she has required rehab stays but rehab has been limited due to getting sick again or the lack of rehab potential.  Patient is near complete functional paraplegia at present and cannot make transfers on her on and is having to use the hoyers lift.  She is sleeping 18-20 hours per day at times.  She has limited ability to participte in PT/OT due to the poor energy.  She has very limited bed mobility related to her conditions.  Her sitters have difficulty getting her moved because she essentially completely dependent on the johan lift at this point.  Previously she could stand brielfy and turn to help, she cannot do this at present.     After lengthy discussion with patient and family, patient indicates that she desires to not go back to the hospice.  She indicates that she would like to discontinue PT/OT after we discussed limited capacity and how much is exhausts her.  We will discuss further what to do about the wound vac.  We can also de-escalate any non-essential medications.  I would start with cutting back on imdur, repatha, valacylovir, allopurinol, supplements, etc.  We can continue to further de-escalate in the future.  Patient and family desired to not go to any of the other follow-up with providers and just come to our office and that is it.  We will go ahead and enroll her in hospice as she has the propensity to get sick and get sick quickly.  I would like her to have appropriate comfort medications available.  Patient should meet criteria as given her functional paraplegia, CKD 3b, resistant infections, HFpEF, chronic pain, history of recurrent resistant organisms, if left untreated would have a life expectancy under 6 months.  I think these diagnosis should quality her for hospice care.  We will send referral.      Patient already has a DNR/DNI in the chart.     We will further de-escalate care as patient desires.  She is going to be driving the ship for this.  She has has made the decision again to not go to the hospital if she gets sick which will aid family in knowing what to do from here on out.        Result Review :  The following data was reviewed by: Moises Oliveira MD on 08/27/2024:  CMP          8/17/2024    04:22 8/18/2024    04:19 8/19/2024    04:22   CMP   Glucose 102  102  80    BUN 30  28  25    Creatinine 1.99  1.71  1.50    EGFR 26.4  31.7  37.1    Sodium 140  140  140    Potassium 4.4  4.1  3.8    Chloride 108  109  109    Calcium 8.3  8.6  8.8    Total Protein 5.5  5.1     Albumin 2.4  2.3     Globulin 3.1  2.8     Total Bilirubin 0.4  0.4     Alkaline Phosphatase 116  103     AST (SGOT) 45  43     ALT (SGPT) 23  23     Albumin/Globulin Ratio 0.8  0.8     BUN/Creatinine Ratio 15.1  16.4  16.7    Anion Gap 13.0  10.0  10.0      CBC          8/18/2024    04:19 8/19/2024    04:22 8/20/2024    05:05   CBC   WBC 4.06  4.50  5.00    RBC 2.58  2.49  2.94    Hemoglobin 7.3  7.1  8.5    Hematocrit 24.0  23.2  26.8    MCV 93.0  93.2  91.2    MCH 28.3  28.5  28.9    MCHC 30.4  30.6  31.7    RDW 21.0  21.2  20.1    Platelets 90  97  96      CBC w/diff          8/18/2024    04:19 8/19/2024    04:22 8/20/2024    05:05   CBC w/Diff   WBC 4.06  4.50  5.00    RBC 2.58  2.49  2.94    Hemoglobin 7.3  7.1  8.5    Hematocrit 24.0  23.2  26.8    MCV 93.0  93.2  91.2    MCH 28.3  28.5  28.9    MCHC 30.4  30.6  31.7    RDW 21.0  21.2  20.1    Platelets 90  97  96    Neutrophil Rel % 48.0  40.6     Immature Granulocyte Rel % 2.0  1.6     Lymphocyte Rel % 24.1  30.0     Monocyte Rel % 21.2  18.9     Eosinophil Rel % 3.7  8.0     Basophil Rel % 1.0  0.9       Lipid Panel          1/22/2024    09:17 5/7/2024    07:20 5/25/2024    05:37   Lipid Panel   Total Cholesterol  207  171    Total Cholesterol 356      Triglycerides 567  266  252    HDL Cholesterol 36  26  23    VLDL  Cholesterol 122  48  44    LDL Cholesterol  198  133  104    LDL/HDL Ratio  4.92  4.24      TSH          5/25/2024    05:37 6/25/2024    05:03 7/9/2024    07:46   TSH   TSH 2.000  7.050  7.680          Details          This result is from an external source.             UA          7/10/2024    14:10 8/16/2024    21:00 8/17/2024    03:34   Urinalysis   Squamous Epithelial Cells, UA  0-2  0-2    Specific Gravity, UA 1.009     1.016  1.017    Ketones, UA  Trace  Trace    Blood, UA MODERATE     Large (3+)  Large (3+)    Leukocytes, UA LARGE     Large (3+)  Large (3+)    Nitrite, UA POSITIVE     Positive  Positive    RBC, UA  11-20  3-5    WBC, UA  21-50  21-50    Bacteria, UA  1+  Trace       Details          This result is from an external source.             Urine Culture          6/16/2024    12:19 6/21/2024    11:16 8/17/2024    03:34   Urine Culture   Urine Culture 25,000 CFU/mL Enterobacter cloacae complex CRE  No growth  >100,000 CFU/mL Escherichia coli     >100,000 CFU/mL Pseudomonas aeruginosa      Blood cultures reviewed.           An After Visit Summary was printed and given to the patient at discharge.  Return in about 3 months (around 11/27/2024), or if symptoms worsen or fail to improve, for follow up for above problems. Longitudinal care., Next scheduled follow up, Med Check. Sooner if problems arise.         PHILL Oliveira MD, [facility], FACP  Electronically signed by Moises Oliveira MD, 08/27/24, 4:41 PM CDT.

## 2024-08-28 ENCOUNTER — HOME CARE VISIT (OUTPATIENT)
Dept: HOME HEALTH SERVICES | Facility: CLINIC | Age: 69
End: 2024-08-28
Payer: MEDICARE

## 2024-08-28 VITALS
RESPIRATION RATE: 16 BRPM | OXYGEN SATURATION: 95 % | DIASTOLIC BLOOD PRESSURE: 70 MMHG | HEART RATE: 72 BPM | TEMPERATURE: 97 F | SYSTOLIC BLOOD PRESSURE: 142 MMHG

## 2024-08-28 PROCEDURE — G0299 HHS/HOSPICE OF RN EA 15 MIN: HCPCS

## 2024-08-28 PROCEDURE — G0158 HHC OT ASSISTANT EA 15: HCPCS

## 2024-08-29 ENCOUNTER — HOME CARE VISIT (OUTPATIENT)
Dept: HOME HEALTH SERVICES | Facility: CLINIC | Age: 69
End: 2024-08-29
Payer: MEDICARE

## 2024-08-29 VITALS
HEART RATE: 88 BPM | SYSTOLIC BLOOD PRESSURE: 118 MMHG | DIASTOLIC BLOOD PRESSURE: 58 MMHG | TEMPERATURE: 98.5 F | OXYGEN SATURATION: 93 % | RESPIRATION RATE: 16 BRPM

## 2024-08-29 PROCEDURE — G0157 HHC PT ASSISTANT EA 15: HCPCS

## 2024-08-30 ENCOUNTER — HOME CARE VISIT (OUTPATIENT)
Dept: HOME HEALTH SERVICES | Facility: CLINIC | Age: 69
End: 2024-08-30
Payer: MEDICARE

## 2024-08-30 VITALS
DIASTOLIC BLOOD PRESSURE: 68 MMHG | RESPIRATION RATE: 16 BRPM | SYSTOLIC BLOOD PRESSURE: 110 MMHG | TEMPERATURE: 97 F | HEART RATE: 78 BPM | OXYGEN SATURATION: 95 %

## 2024-08-30 PROCEDURE — G0300 HHS/HOSPICE OF LPN EA 15 MIN: HCPCS

## 2024-08-31 VITALS
DIASTOLIC BLOOD PRESSURE: 80 MMHG | RESPIRATION RATE: 16 BRPM | HEART RATE: 64 BPM | SYSTOLIC BLOOD PRESSURE: 146 MMHG | TEMPERATURE: 97.5 F | OXYGEN SATURATION: 95 %

## 2024-09-02 ENCOUNTER — HOME CARE VISIT (OUTPATIENT)
Dept: HOME HEALTH SERVICES | Facility: CLINIC | Age: 69
End: 2024-09-02
Payer: MEDICARE

## 2024-09-02 PROCEDURE — G0300 HHS/HOSPICE OF LPN EA 15 MIN: HCPCS

## 2024-09-03 ENCOUNTER — HOME CARE VISIT (OUTPATIENT)
Dept: HOME HEALTH SERVICES | Facility: CLINIC | Age: 69
End: 2024-09-03
Payer: MEDICARE

## 2024-09-03 ENCOUNTER — TELEPHONE (OUTPATIENT)
Dept: INTERNAL MEDICINE | Facility: CLINIC | Age: 69
End: 2024-09-03
Payer: MEDICARE

## 2024-09-03 VITALS
RESPIRATION RATE: 18 BRPM | TEMPERATURE: 97.3 F | DIASTOLIC BLOOD PRESSURE: 70 MMHG | SYSTOLIC BLOOD PRESSURE: 148 MMHG | HEART RATE: 93 BPM | OXYGEN SATURATION: 95 %

## 2024-09-03 NOTE — TELEPHONE ENCOUNTER
Called [facility] and gave order to Darell, that patient wants to d/c PT/OT, but continue wound care, per Dr. Oliveira.

## 2024-09-04 ENCOUNTER — HOME CARE VISIT (OUTPATIENT)
Dept: HOME HEALTH SERVICES | Facility: CLINIC | Age: 69
End: 2024-09-04
Payer: MEDICARE

## 2024-09-04 PROCEDURE — G0299 HHS/HOSPICE OF RN EA 15 MIN: HCPCS

## 2024-09-05 VITALS
RESPIRATION RATE: 18 BRPM | TEMPERATURE: 97 F | SYSTOLIC BLOOD PRESSURE: 122 MMHG | HEART RATE: 87 BPM | DIASTOLIC BLOOD PRESSURE: 78 MMHG | OXYGEN SATURATION: 99 %

## 2024-09-06 ENCOUNTER — HOME CARE VISIT (OUTPATIENT)
Dept: HOME HEALTH SERVICES | Facility: CLINIC | Age: 69
End: 2024-09-06
Payer: MEDICARE

## 2024-09-06 VITALS
SYSTOLIC BLOOD PRESSURE: 148 MMHG | OXYGEN SATURATION: 95 % | HEART RATE: 87 BPM | DIASTOLIC BLOOD PRESSURE: 68 MMHG | TEMPERATURE: 96.8 F | RESPIRATION RATE: 16 BRPM

## 2024-09-06 PROCEDURE — G0300 HHS/HOSPICE OF LPN EA 15 MIN: HCPCS

## 2024-09-09 ENCOUNTER — HOME CARE VISIT (OUTPATIENT)
Dept: HOME HEALTH SERVICES | Facility: CLINIC | Age: 69
End: 2024-09-09
Payer: MEDICARE

## 2024-09-09 PROCEDURE — G0300 HHS/HOSPICE OF LPN EA 15 MIN: HCPCS

## 2024-09-10 VITALS
SYSTOLIC BLOOD PRESSURE: 138 MMHG | HEART RATE: 100 BPM | TEMPERATURE: 97.8 F | RESPIRATION RATE: 14 BRPM | OXYGEN SATURATION: 90 % | DIASTOLIC BLOOD PRESSURE: 64 MMHG

## 2024-09-11 ENCOUNTER — OFFICE VISIT (OUTPATIENT)
Dept: WOUND CARE | Facility: HOSPITAL | Age: 69
End: 2024-09-11
Payer: MEDICARE

## 2024-09-11 DIAGNOSIS — Z99.3 DEPENDENCE ON WHEELCHAIR: ICD-10-CM

## 2024-09-11 DIAGNOSIS — Z48.817 ENCOUNTER FOR SURGICAL AFTERCARE FOLLOWING SURGERY ON THE SKIN AND SUBCUTANEOUS TISSUE: Primary | ICD-10-CM

## 2024-09-11 DIAGNOSIS — A49.9 BACTERIAL INFECTION, UNSPECIFIED: ICD-10-CM

## 2024-09-11 PROCEDURE — 97605 NEG PRS WND THER DME<=50SQCM: CPT

## 2024-09-13 ENCOUNTER — HOME CARE VISIT (OUTPATIENT)
Dept: HOME HEALTH SERVICES | Facility: CLINIC | Age: 69
End: 2024-09-13
Payer: MEDICARE

## 2024-09-13 PROCEDURE — G0300 HHS/HOSPICE OF LPN EA 15 MIN: HCPCS

## 2024-09-15 VITALS
OXYGEN SATURATION: 94 % | SYSTOLIC BLOOD PRESSURE: 150 MMHG | DIASTOLIC BLOOD PRESSURE: 60 MMHG | HEART RATE: 83 BPM | RESPIRATION RATE: 16 BRPM | TEMPERATURE: 97.3 F

## 2024-09-16 ENCOUNTER — HOME CARE VISIT (OUTPATIENT)
Dept: HOME HEALTH SERVICES | Facility: CLINIC | Age: 69
End: 2024-09-16
Payer: MEDICARE

## 2024-09-16 VITALS
RESPIRATION RATE: 18 BRPM | TEMPERATURE: 97.2 F | HEART RATE: 106 BPM | OXYGEN SATURATION: 95 % | SYSTOLIC BLOOD PRESSURE: 150 MMHG | DIASTOLIC BLOOD PRESSURE: 86 MMHG

## 2024-09-16 PROCEDURE — G0299 HHS/HOSPICE OF RN EA 15 MIN: HCPCS

## 2024-09-18 ENCOUNTER — HOME CARE VISIT (OUTPATIENT)
Dept: HOME HEALTH SERVICES | Facility: CLINIC | Age: 69
End: 2024-09-18
Payer: MEDICARE

## 2024-09-18 VITALS
RESPIRATION RATE: 18 BRPM | HEART RATE: 98 BPM | SYSTOLIC BLOOD PRESSURE: 120 MMHG | TEMPERATURE: 74 F | OXYGEN SATURATION: 97 % | DIASTOLIC BLOOD PRESSURE: 80 MMHG

## 2024-09-18 PROCEDURE — G0299 HHS/HOSPICE OF RN EA 15 MIN: HCPCS

## 2024-09-20 ENCOUNTER — HOME CARE VISIT (OUTPATIENT)
Dept: HOME HEALTH SERVICES | Facility: CLINIC | Age: 69
End: 2024-09-20
Payer: MEDICARE

## 2024-09-20 PROCEDURE — G0299 HHS/HOSPICE OF RN EA 15 MIN: HCPCS

## 2024-09-21 VITALS
DIASTOLIC BLOOD PRESSURE: 77 MMHG | SYSTOLIC BLOOD PRESSURE: 128 MMHG | SYSTOLIC BLOOD PRESSURE: 128 MMHG | DIASTOLIC BLOOD PRESSURE: 72 MMHG | HEART RATE: 78 BPM | TEMPERATURE: 97.7 F | OXYGEN SATURATION: 98 % | OXYGEN SATURATION: 98 % | RESPIRATION RATE: 18 BRPM | TEMPERATURE: 97.7 F | RESPIRATION RATE: 18 BRPM | HEART RATE: 78 BPM

## 2024-09-23 ENCOUNTER — HOME CARE VISIT (OUTPATIENT)
Dept: HOME HEALTH SERVICES | Facility: CLINIC | Age: 69
End: 2024-09-23
Payer: MEDICARE

## 2024-09-23 PROCEDURE — G0300 HHS/HOSPICE OF LPN EA 15 MIN: HCPCS

## 2024-09-24 VITALS
DIASTOLIC BLOOD PRESSURE: 78 MMHG | RESPIRATION RATE: 18 BRPM | SYSTOLIC BLOOD PRESSURE: 128 MMHG | TEMPERATURE: 97.4 F | HEART RATE: 84 BPM | OXYGEN SATURATION: 94 %

## 2024-09-25 ENCOUNTER — HOME CARE VISIT (OUTPATIENT)
Dept: HOME HEALTH SERVICES | Facility: CLINIC | Age: 69
End: 2024-09-25
Payer: MEDICARE

## 2024-09-25 PROCEDURE — G0299 HHS/HOSPICE OF RN EA 15 MIN: HCPCS

## 2024-09-26 VITALS
HEART RATE: 90 BPM | DIASTOLIC BLOOD PRESSURE: 78 MMHG | OXYGEN SATURATION: 97 % | SYSTOLIC BLOOD PRESSURE: 128 MMHG | RESPIRATION RATE: 18 BRPM | TEMPERATURE: 97.7 F

## 2024-09-27 ENCOUNTER — HOME CARE VISIT (OUTPATIENT)
Dept: HOME HEALTH SERVICES | Facility: CLINIC | Age: 69
End: 2024-09-27
Payer: MEDICARE

## 2024-09-27 ENCOUNTER — OFFICE VISIT (OUTPATIENT)
Dept: WOUND CARE | Facility: HOSPITAL | Age: 69
End: 2024-09-27
Payer: MEDICARE

## 2024-09-27 DIAGNOSIS — Z48.817 ENCOUNTER FOR SURGICAL AFTERCARE FOLLOWING SURGERY ON THE SKIN AND SUBCUTANEOUS TISSUE: Primary | ICD-10-CM

## 2024-09-27 DIAGNOSIS — A49.9 BACTERIAL INFECTION, UNSPECIFIED: ICD-10-CM

## 2024-09-27 DIAGNOSIS — Z99.3 DEPENDENCE ON WHEELCHAIR: ICD-10-CM

## 2024-09-27 DIAGNOSIS — G82.20 PARAPLEGIA, UNSPECIFIED: ICD-10-CM

## 2024-09-27 PROCEDURE — 11042 DBRDMT SUBQ TIS 1ST 20SQCM/<: CPT | Performed by: NURSE PRACTITIONER

## 2024-09-27 PROCEDURE — 97605 NEG PRS WND THER DME<=50SQCM: CPT

## 2024-09-30 ENCOUNTER — HOME CARE VISIT (OUTPATIENT)
Dept: HOME HEALTH SERVICES | Facility: CLINIC | Age: 69
End: 2024-09-30
Payer: MEDICARE

## 2024-09-30 PROCEDURE — G0300 HHS/HOSPICE OF LPN EA 15 MIN: HCPCS

## 2024-10-01 VITALS
SYSTOLIC BLOOD PRESSURE: 146 MMHG | RESPIRATION RATE: 16 BRPM | HEART RATE: 64 BPM | DIASTOLIC BLOOD PRESSURE: 72 MMHG | OXYGEN SATURATION: 94 % | TEMPERATURE: 97.4 F

## 2024-10-02 ENCOUNTER — HOME CARE VISIT (OUTPATIENT)
Dept: HOME HEALTH SERVICES | Facility: CLINIC | Age: 69
End: 2024-10-02
Payer: MEDICARE

## 2024-10-02 PROCEDURE — G0300 HHS/HOSPICE OF LPN EA 15 MIN: HCPCS

## 2024-10-04 ENCOUNTER — HOME CARE VISIT (OUTPATIENT)
Dept: HOME HEALTH SERVICES | Facility: CLINIC | Age: 69
End: 2024-10-04
Payer: MEDICARE

## 2024-10-04 VITALS
DIASTOLIC BLOOD PRESSURE: 68 MMHG | RESPIRATION RATE: 16 BRPM | TEMPERATURE: 97.8 F | SYSTOLIC BLOOD PRESSURE: 140 MMHG | HEART RATE: 89 BPM | OXYGEN SATURATION: 94 %

## 2024-10-04 PROCEDURE — G0300 HHS/HOSPICE OF LPN EA 15 MIN: HCPCS

## 2024-10-07 ENCOUNTER — HOME CARE VISIT (OUTPATIENT)
Dept: HOME HEALTH SERVICES | Facility: CLINIC | Age: 69
End: 2024-10-07
Payer: MEDICARE

## 2024-10-07 VITALS
SYSTOLIC BLOOD PRESSURE: 140 MMHG | RESPIRATION RATE: 12 BRPM | HEART RATE: 93 BPM | DIASTOLIC BLOOD PRESSURE: 70 MMHG | TEMPERATURE: 97.7 F | OXYGEN SATURATION: 95 %

## 2024-10-07 VITALS
OXYGEN SATURATION: 93 % | DIASTOLIC BLOOD PRESSURE: 70 MMHG | TEMPERATURE: 97.8 F | RESPIRATION RATE: 18 BRPM | HEART RATE: 90 BPM | SYSTOLIC BLOOD PRESSURE: 120 MMHG

## 2024-10-07 PROCEDURE — G0300 HHS/HOSPICE OF LPN EA 15 MIN: HCPCS

## 2024-10-07 RX ORDER — ALLOPURINOL 100 MG/1
100 TABLET ORAL DAILY
Qty: 30 TABLET | Refills: 0 | OUTPATIENT
Start: 2024-10-07

## 2024-10-07 NOTE — TELEPHONE ENCOUNTER
Requested Prescriptions     Pending Prescriptions Disp Refills    allopurinol (ZYLOPRIM) 100 MG tablet [Pharmacy Med Name: ALLOPURINOL 100 MG TABLET] 30 tablet 0     Sig: TAKE 1 TABLET BY MOUTH DAILY       Last Office Visit: Visit date not found  Next Office Visit: Visit date not found  Last Medication Refill: 7/26/2024 with 0 RF

## 2024-10-09 ENCOUNTER — OFFICE VISIT (OUTPATIENT)
Dept: WOUND CARE | Facility: HOSPITAL | Age: 69
End: 2024-10-09
Payer: MEDICARE

## 2024-10-09 ENCOUNTER — HOME CARE VISIT (OUTPATIENT)
Dept: HOME HEALTH SERVICES | Facility: CLINIC | Age: 69
End: 2024-10-09
Payer: MEDICARE

## 2024-10-09 DIAGNOSIS — Z48.817 ENCOUNTER FOR SURGICAL AFTERCARE FOLLOWING SURGERY ON THE SKIN AND SUBCUTANEOUS TISSUE: Primary | ICD-10-CM

## 2024-10-09 NOTE — CASE COMMUNICATION
Patient missed a snv visit from [facility] on October 9, 2024.     Reason: Wound care appointment today.       For your records only.   Per CMS Guidance, MD must be notified of missed/cancelled visits; therefore the prescribed frequency was not met.

## 2024-10-11 ENCOUNTER — HOME CARE VISIT (OUTPATIENT)
Dept: HOME HEALTH SERVICES | Facility: CLINIC | Age: 69
End: 2024-10-11
Payer: MEDICARE

## 2024-10-11 PROCEDURE — G0300 HHS/HOSPICE OF LPN EA 15 MIN: HCPCS

## 2024-10-11 RX ORDER — ALLOPURINOL 100 MG/1
100 TABLET ORAL DAILY
Qty: 30 TABLET | Refills: 0 | OUTPATIENT
Start: 2024-10-11

## 2024-10-11 RX ORDER — COLCHICINE 0.6 MG/1
0.6 TABLET ORAL DAILY
Qty: 30 TABLET | Refills: 0 | OUTPATIENT
Start: 2024-10-11

## 2024-10-11 RX ORDER — ONDANSETRON 4 MG/1
TABLET, ORALLY DISINTEGRATING ORAL
Qty: 30 TABLET | Refills: 1 | Status: SHIPPED | OUTPATIENT
Start: 2024-10-11

## 2024-10-11 NOTE — TELEPHONE ENCOUNTER
Requested Prescriptions     Pending Prescriptions Disp Refills    colchicine (COLCRYS) 0.6 MG tablet [Pharmacy Med Name: COLCHICINE 0.6 MG TABLET] 30 tablet 0     Sig: TAKE 1 TABLET BY MOUTH DAILY    allopurinol (ZYLOPRIM) 100 MG tablet [Pharmacy Med Name: ALLOPURINOL 100 MG TABLET] 30 tablet 0     Sig: TAKE 1 TABLET BY MOUTH DAILY       Last Office Visit: Visit date not found  Next Office Visit: Visit date not found  Last Medication Refill:

## 2024-10-14 ENCOUNTER — HOME CARE VISIT (OUTPATIENT)
Dept: HOME HEALTH SERVICES | Facility: CLINIC | Age: 69
End: 2024-10-14
Payer: MEDICARE

## 2024-10-14 VITALS
HEART RATE: 96 BPM | OXYGEN SATURATION: 91 % | SYSTOLIC BLOOD PRESSURE: 156 MMHG | DIASTOLIC BLOOD PRESSURE: 68 MMHG | RESPIRATION RATE: 14 BRPM | TEMPERATURE: 97.9 F

## 2024-10-14 PROCEDURE — G0300 HHS/HOSPICE OF LPN EA 15 MIN: HCPCS

## 2024-10-15 VITALS
HEART RATE: 92 BPM | OXYGEN SATURATION: 93 % | SYSTOLIC BLOOD PRESSURE: 124 MMHG | DIASTOLIC BLOOD PRESSURE: 58 MMHG | TEMPERATURE: 97.6 F | RESPIRATION RATE: 14 BRPM

## 2024-10-16 RX ORDER — TRAMADOL HYDROCHLORIDE 50 MG/1
50 TABLET ORAL EVERY 12 HOURS PRN
Qty: 60 TABLET | Refills: 2 | Status: SHIPPED | OUTPATIENT
Start: 2024-10-16

## 2024-10-16 NOTE — TELEPHONE ENCOUNTER
Caller: Tawny Shin    Relationship: Self    Best call back number: [phone]     Requested Prescriptions:   Requested Prescriptions     Pending Prescriptions Disp Refills    traMADol (ULTRAM) 50 MG tablet       Sig: Take 1 tablet by mouth Every 12 (Twelve) Hours As Needed for Moderate Pain. Indications: Pain        Pharmacy where request should be sent: [facility] - [place], [place] - [address] - [phone] [facility] [phone]      Last office visit with prescribing clinician: 8/27/2024   Last telemedicine visit with prescribing clinician: Visit date not found   Next office visit with prescribing clinician: 11/7/2024     Additional details provided by patient: 2 LEFT    Does the patient have less than a 3 day supply:  [x] Yes  [] No    Would you like a call back once the refill request has been completed: [] Yes [x] No    If the office needs to give you a call back, can they leave a voicemail: [] Yes [x] No    Seferino Box Rep   10/16/24 10:17 CDT

## 2024-10-17 ENCOUNTER — HOME CARE VISIT (OUTPATIENT)
Dept: HOME HEALTH SERVICES | Facility: CLINIC | Age: 69
End: 2024-10-17
Payer: MEDICARE

## 2024-10-17 PROCEDURE — G0299 HHS/HOSPICE OF RN EA 15 MIN: HCPCS

## 2024-10-18 ENCOUNTER — HOME CARE VISIT (OUTPATIENT)
Dept: HOME HEALTH SERVICES | Facility: CLINIC | Age: 69
End: 2024-10-18
Payer: MEDICARE

## 2024-10-19 NOTE — PROGRESS NOTES
11/01/21 1100   Restrictions/Precautions   Restrictions/Precautions Weight Bearing; Fall Risk   Required Braces or Orthoses? Yes   Implants present? Pacemaker   Lower Extremity Weight Bearing Restrictions   Right Lower Extremity Weight Bearing Weight Bearing As Tolerated   Left Lower Extremity Weight Bearing Weight Bearing As Tolerated   Required Braces or Orthoses   Spinal Thoracic Lumbar Sacral Orthotics   Position Activity Restriction   Spinal Precautions No Bending; No Lifting; No Twisting   General   Additional Pertinent Hx lumbar decompression/fusion, C6 corpectomy, L1-2 disectomy, I&D psoas abscess, L charcot ankle/foot, HTN, RA, spinal stenosis, cervical disc myelopathy, chronic emboism and thrombosis DVTs, osteoporosis   Diagnosis T12 compression fx s/p kyphoplasty, I&D right hip    Pain Screening   Patient Currently in Pain Yes   Pain Assessment   Pain Assessment Faces   Pain Level 10   Patient's Stated Pain Goal No pain   Pain Location Back   Pain Orientation Mid;Lower   Pain Descriptors Discomfort;Sore;Aching   Pain Frequency Continuous   Pain Onset On-going   Clinical Progression Not changed   Functional Pain Assessment Prevents or interferes some active activities and ADLs   Non-Pharmaceutical Pain Intervention(s) Rest   Bed Mobility   Supine to Sit Maximal assistance  (to initate roll and manage trunk/BLE)   Sit to Supine Maximal assistance  (x2 to manage trunk and BLE)   Comment pt requires maxAx1 to initiate log roll to right and to bring trunk and    Transfers   Sit to Stand Dependent/Total  (with steady)   Stand to sit Dependent/Total   Bed to Chair Dependent/Total  (kamlesh steady )   Comment lack of BLe participation during  steady, frequent cues to keep hands on handle bar vs her forearm to assist w/stand. Ambulation   Ambulation?  No   Balance   Posture Fair   Sitting - Static Fair  (holds bedrails with BUE SBA)   Sitting - Dynamic Poor   Exercises   Heelslides x10 AAROM   Ankle Pumps x10 BLE   Comments MAX CUES FOR ENCOURAGEMENT TO PARTICIPATE   Conditions Requiring Skilled Therapeutic Intervention   Assessment pt cotreated with OT, pt required to be cleaned up first. pt has difficulty with roll initiation without max vcs and tcs with pad to help initiate roll and cues to grab bedrail. pt holds herself up using bedrails but fatigues quickly and c/o back pain. attempt to bring pt [address] using kamlesh steady but pt had accident, attempt to go to toilet with OT but pt had difficulty with standing in steady enough for safe tf. pt demonstrates with increased fatigue and all four extremity weakness during steady tfs and was put back to bed in order to get cleaned up a second time. pt able to perform some bed level eercises with max verbal and tactille cues for encouragement to participate and for muscle contraction during exercises. Activity Tolerance   Activity Tolerance Patient limited by pain; Patient limited by fatigue Discharged

## 2024-10-20 ENCOUNTER — HOME CARE VISIT (OUTPATIENT)
Dept: HOME HEALTH SERVICES | Facility: CLINIC | Age: 69
End: 2024-10-20
Payer: MEDICARE

## 2024-10-20 PROCEDURE — G0299 HHS/HOSPICE OF RN EA 15 MIN: HCPCS

## 2024-10-21 VITALS
DIASTOLIC BLOOD PRESSURE: 76 MMHG | HEART RATE: 84 BPM | RESPIRATION RATE: 18 BRPM | TEMPERATURE: 97 F | SYSTOLIC BLOOD PRESSURE: 142 MMHG | OXYGEN SATURATION: 94 %

## 2024-10-22 ENCOUNTER — TELEPHONE (OUTPATIENT)
Dept: INTERNAL MEDICINE | Facility: CLINIC | Age: 69
End: 2024-10-22

## 2024-10-22 NOTE — PROGRESS NOTES
"Received a call from the patient's son, Dr. Casa Shin on 10/21/2024.  His mother has been continuing to decline and having \"pain everywhere\".  She has not been eating and drinking well.  Essentially eating crackers and sipping on water.  She had elected to go home with hospice after our last visit in August.  However when she got home she had changed her mind and indicated that she wanted to continue her current level of care but did want to remain DO NOT RESUSCITATE and DO NOT INTUBATE.  She has continued to decline both mentally and physically.  She has reached out to her son and indicated that she would like to enroll in hospice.  We will send over an updated referral with previous office notes as well as this updated note.    Diagnosis list:  1. Recurrent infections (Primary)  -     Ambulatory Referral to Home Hospice     2. History of recurrent UTIs  -     Ambulatory Referral to Home Hospice     3. Rheumatoid arthritis involving multiple sites, unspecified whether rheumatoid factor present  -     Ambulatory Referral to Home Hospice     4. Chronic pain syndrome  -     Ambulatory Referral to Home Hospice     5. Near functional paraplegia  -     Ambulatory Referral to Home Hospice     6. Pressure injury of skin of right buttock, unspecified injury stage  -     Ambulatory Referral to Home Hospice     7. Stage 3b chronic kidney disease  -     Ambulatory Referral to Home Hospice     8. Chronic heart failure with preserved ejection fraction (HFpEF)  -     Ambulatory Referral to Home Hospice     9. Anemia of chronic disease  -     Ambulatory Referral to Home Hospice     10. Functional neurological symptom disorder with weakness or paralysis    From last assessment and plan in 8/2024:  \"Present for today's discuss is Dr. Casa Shin (patient's son) and Mackenzie Light (patient's daughter) and of course Mrs. Tawny Shin.  Patient has had 6 hospitalizations in 2024 and numerous in 2021 and 2022.  I have cared for her for " several years both in the hospital and in clinic since transitioning to primary care.  I talked to the patient's son prior to the visit as the patient has not been bouncing back recently both physically and cognitively.  I have witnessed over the last several years a fairly significantly decline, and in recent months not quite making progress back to previous level of functioning.  Patient has caregivers, Cate and Andrew who provide a lot of care for her, but they have got to where they are even having a lot of difficulty and having to call for assistance more routinely.  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  These infections include UTIs, skin infections, previous bone infections.  Patient has the propensity from getting sick quickly and ending up septic with changes in mentation with encephalopathy.  Given patient's chronic medical problems and continued decline, we had a lengthy discussion about the patient's future care.       Patient has expressed to me previously that she wanted me to be upfront about how she was doing as well as she has voiced the desire to avoid the hospital in the past.  After hospitalizations she has required rehab stays but rehab has been limited due to getting sick again or the lack of rehab potential.  Patient is near complete functional paraplegia at present and cannot make transfers on her on and is having to use the hoyers lift.  She is sleeping 18-20 hours per day at times.  She has limited ability to participte in PT/OT due to the poor energy.  She has very limited bed mobility related to her conditions.  Her sitters have difficulty getting her moved because she essentially completely dependent on the johan lift at this point.  Previously she could stand brielfy and turn to help, she cannot do this at present.      After lengthy discussion with patient and family, patient indicates that she desires to not  "go back to the hospice.  She indicates that she would like to discontinue PT/OT after we discussed limited capacity and how much is exhausts her.  We will discuss further what to do about the wound vac.  We can also de-escalate any non-essential medications.  I would start with cutting back on imdur, repatha, valacylovir, allopurinol, supplements, etc.  We can continue to further de-escalate in the future.  Patient and family desired to not go to any of the other follow-up with providers and just come to our office and that is it.  We will go ahead and enroll her in hospice as she has the propensity to get sick and get sick quickly.  I would like her to have appropriate comfort medications available.  Patient should meet criteria as given her functional paraplegia, CKD 3b, resistant infections, HFpEF, chronic pain, history of recurrent resistant organisms, if left untreated would have a life expectancy under 6 months.  I think these diagnosis should quality her for hospice care.  We will send referral.        Patient already has a DNR/DNI in the chart.    We will further de-escalate care as patient desires.  She is going to be driving the ship for this.  She has has made the decision again to not go to the hospital if she gets sick which will aid family in knowing what to do from here on out.  \"  "

## 2024-10-22 NOTE — TELEPHONE ENCOUNTER
Called [facility] and gave verbal order for Hospice, per Dr. Oliveira, and will fax updated note from chart to her today as well.

## 2024-10-22 NOTE — TELEPHONE ENCOUNTER
Caller: [facility]    Relationship: Other    Best call back number:[phone]  EXT [id number]    What orders are you requesting (i.e. lab or imaging): HOSPICE FOR TERMINAL [facility]LLNESS    In what timeframe would the patient need to come in: BEFORE 4 PM TODAY 10/22/2024    FAX: [phone]

## 2024-10-23 ENCOUNTER — HOME CARE VISIT (OUTPATIENT)
Dept: HOME HEALTH SERVICES | Facility: CLINIC | Age: 69
End: 2024-10-23
Payer: MEDICARE

## 2024-10-23 VITALS
HEART RATE: 97 BPM | DIASTOLIC BLOOD PRESSURE: 84 MMHG | TEMPERATURE: 98.3 F | RESPIRATION RATE: 18 BRPM | OXYGEN SATURATION: 94 % | SYSTOLIC BLOOD PRESSURE: 130 MMHG

## 2024-10-24 PROBLEM — Z87.898 HISTORY OF URINARY RETENTION: Status: ACTIVE | Noted: 2024-05-02

## 2024-10-24 PROBLEM — L72.9 CYST OF BUTTOCKS: Status: ACTIVE | Noted: 2024-04-23

## 2024-10-24 PROBLEM — L03.114 CELLULITIS OF LEFT UPPER LIMB: Status: ACTIVE | Noted: 2018-08-16

## 2024-10-24 PROBLEM — R78.81 GRAM-NEGATIVE BACTEREMIA: Status: ACTIVE | Noted: 2021-10-22

## 2024-10-24 PROBLEM — I82.502 CHRONIC EMBOLISM AND THROMBOSIS OF UNSPECIFIED DEEP VEINS OF LEFT LOWER EXTREMITY (HCC): Chronic | Status: ACTIVE | Noted: 2021-07-13

## 2024-10-24 PROBLEM — F44.4 FUNCTIONAL NEUROLOGICAL SYMPTOM DISORDER WITH WEAKNESS OR PARALYSIS: Status: ACTIVE | Noted: 2022-04-22

## 2024-10-24 PROBLEM — I97.190 CARDIAC PACEMAKER SYNDROME: Status: ACTIVE | Noted: 2024-10-24

## 2024-10-24 PROBLEM — I87.2 VENOUS INSUFFICIENCY: Chronic | Status: ACTIVE | Noted: 2022-04-23

## 2024-10-24 PROBLEM — L89.309 PRESSURE INJURY OF SKIN OF BUTTOCK: Chronic | Status: ACTIVE | Noted: 2023-01-17

## 2024-10-24 PROBLEM — S22.080A T12 COMPRESSION FRACTURE, INITIAL ENCOUNTER (HCC): Status: ACTIVE | Noted: 2021-05-26

## 2024-10-24 PROBLEM — R13.19 ESOPHAGEAL DYSPHAGIA: Status: ACTIVE | Noted: 2024-06-14

## 2024-10-24 PROBLEM — S06.4XAA EPIDURAL HEMATOMA: Status: ACTIVE | Noted: 2021-11-04

## 2024-10-24 PROBLEM — D63.8 ANEMIA OF CHRONIC DISEASE: Status: ACTIVE | Noted: 2022-06-05

## 2024-10-24 PROBLEM — C44.529 SQUAMOUS CELL CARCINOMA OF BACK: Status: ACTIVE | Noted: 2023-07-17

## 2024-10-24 PROBLEM — C44.90 SKIN CANCER: Status: ACTIVE | Noted: 2023-07-17

## 2024-10-24 PROBLEM — I10 ESSENTIAL HYPERTENSION: Chronic | Status: ACTIVE | Noted: 2021-10-28

## 2024-10-24 PROBLEM — B96.20 E. COLI UTI (URINARY TRACT INFECTION): Status: ACTIVE | Noted: 2024-08-18

## 2024-10-24 PROBLEM — G82.20 LOWER PARAPLEGIA (HCC): Status: ACTIVE | Noted: 2023-05-10

## 2024-10-24 PROBLEM — R78.81 POSITIVE BLOOD CULTURE: Status: ACTIVE | Noted: 2024-05-03

## 2024-10-24 PROBLEM — M10.9 ACUTE GOUT: Status: ACTIVE | Noted: 2024-06-29

## 2024-10-24 PROBLEM — R53.1 GENERALIZED WEAKNESS: Status: ACTIVE | Noted: 2024-05-02

## 2024-10-24 PROBLEM — K80.20 CHOLELITHIASIS: Status: ACTIVE | Noted: 2022-06-08

## 2024-10-24 PROBLEM — M81.0 OSTEOPOROSIS: Status: ACTIVE | Noted: 2021-10-21

## 2024-10-24 PROBLEM — Q24.5 CORONARY ARTERY ABNORMALITY: Status: ACTIVE | Noted: 2024-10-24

## 2024-10-24 PROBLEM — R07.89 RIGHT-SIDED CHEST WALL PAIN: Status: ACTIVE | Noted: 2023-08-28

## 2024-10-24 PROBLEM — E66.9 OBESITY: Status: ACTIVE | Noted: 2024-10-24

## 2024-10-24 PROBLEM — K59.00 CONSTIPATION: Status: ACTIVE | Noted: 2018-11-06

## 2024-10-24 PROBLEM — N18.32 STAGE 3B CHRONIC KIDNEY DISEASE (HCC): Status: ACTIVE | Noted: 2024-04-21

## 2024-10-24 PROBLEM — A41.51 SEPSIS DUE TO ESCHERICHIA COLI WITHOUT ACUTE ORGAN DYSFUNCTION (HCC): Status: ACTIVE | Noted: 2024-04-23

## 2024-10-24 PROBLEM — I25.10 ARTERIOSCLEROSIS OF CORONARY ARTERY: Status: ACTIVE | Noted: 2021-10-28

## 2024-10-24 PROBLEM — E55.9 VITAMIN D DEFICIENCY: Status: ACTIVE | Noted: 2024-10-24

## 2024-10-24 PROBLEM — Z95.0 PRESENCE OF CARDIAC PACEMAKER: Status: ACTIVE | Noted: 2021-10-28

## 2024-10-24 PROBLEM — N17.9 AKI (ACUTE KIDNEY INJURY) (HCC): Status: ACTIVE | Noted: 2024-08-18

## 2024-10-24 PROBLEM — R82.90 MALODOROUS URINE: Status: ACTIVE | Noted: 2024-01-22

## 2024-10-24 PROBLEM — G83.9 PARALYSIS (HCC): Status: ACTIVE | Noted: 2024-05-02

## 2024-10-24 PROBLEM — T14.8XXA HEMATOMA: Status: ACTIVE | Noted: 2023-08-28

## 2024-10-24 PROBLEM — E03.9 HYPOTHYROIDISM: Status: ACTIVE | Noted: 2024-05-16

## 2024-10-24 PROBLEM — M06.9 RHEUMATOID ARTHRITIS (HCC): Status: ACTIVE | Noted: 2018-06-14

## 2024-10-24 PROBLEM — J90 PLEURAL EFFUSION, LEFT: Status: ACTIVE | Noted: 2022-04-22

## 2024-10-24 PROBLEM — N39.0 E. COLI UTI (URINARY TRACT INFECTION): Status: ACTIVE | Noted: 2024-08-18

## 2024-10-24 PROBLEM — I50.32 CHRONIC HEART FAILURE WITH PRESERVED EJECTION FRACTION (HCC): Status: ACTIVE | Noted: 2022-04-23

## 2024-10-24 PROBLEM — G89.29 CHRONIC PAIN: Status: ACTIVE | Noted: 2022-06-04

## 2024-10-24 PROBLEM — L89.609 PRESSURE INJURY OF SKIN OF HEEL: Status: ACTIVE | Noted: 2022-06-04

## 2024-10-24 PROBLEM — I25.10 CORONARY ARTERIOSCLEROSIS: Status: ACTIVE | Noted: 2024-10-24

## 2024-10-24 PROBLEM — E66.811 CLASS 1 OBESITY DUE TO EXCESS CALORIES WITH SERIOUS COMORBIDITY AND BODY MASS INDEX (BMI) OF 33.0 TO 33.9 IN ADULT: Status: ACTIVE | Noted: 2022-04-23

## 2024-10-24 PROBLEM — D50.9 IRON DEFICIENCY ANEMIA: Status: ACTIVE | Noted: 2021-10-21

## 2024-10-24 PROBLEM — R93.5 ABNORMAL CT OF THE ABDOMEN: Status: ACTIVE | Noted: 2024-05-24

## 2024-10-24 PROBLEM — E66.09 CLASS 1 OBESITY DUE TO EXCESS CALORIES WITH SERIOUS COMORBIDITY AND BODY MASS INDEX (BMI) OF 33.0 TO 33.9 IN ADULT: Status: ACTIVE | Noted: 2022-04-23

## 2024-10-24 PROBLEM — M06.9 RHEUMATOID ARTHRITIS INVOLVING MULTIPLE JOINTS (HCC): Status: ACTIVE | Noted: 2022-04-23

## 2024-10-24 PROBLEM — E78.5 HYPERLIPIDEMIA LDL GOAL <70: Status: ACTIVE | Noted: 2024-01-19

## 2024-10-25 ENCOUNTER — HOME CARE VISIT (OUTPATIENT)
Dept: HOME HEALTH SERVICES | Facility: HOME HEALTHCARE | Age: 69
End: 2024-10-25
Payer: MEDICARE

## 2024-10-25 ENCOUNTER — TELEPHONE (OUTPATIENT)
Dept: INTERNAL MEDICINE | Facility: CLINIC | Age: 69
End: 2024-10-25
Payer: MEDICARE

## 2024-10-25 ENCOUNTER — TELEPHONE (OUTPATIENT)
Dept: NEUROLOGY | Facility: CLINIC | Age: 69
End: 2024-10-25
Payer: MEDICARE

## 2024-10-25 ENCOUNTER — HOME CARE VISIT (OUTPATIENT)
Dept: HOME HEALTH SERVICES | Facility: CLINIC | Age: 69
End: 2024-10-25
Payer: MEDICARE

## 2024-10-25 NOTE — CASE COMMUNICATION
Patient missed a [facility] visit from [facility] on October 25, 2024.     Reason: Pt going hospice, hospice in home at this time..       For your records only.   Per CMS Guidance, MD must be notified of missed/cancelled visits; therefore the prescribed frequency was not met.

## (undated) DEVICE — MARKR SKIN W/RULR AND LBL

## (undated) DEVICE — PK TURNOVER RM ADV

## (undated) DEVICE — SUT MNCRYL 4/0 PS2 27IN UD MCP426H

## (undated) DEVICE — GLV SURG DERMASSURE GRN LF PF 7.0

## (undated) DEVICE — SPONGE,NEURO,0.5"X3",XR,STRL,LF,10/PK: Brand: MEDLINE

## (undated) DEVICE — GOWN,NON-REINFORCED,SIRUS,SET IN SLV,XXL: Brand: MEDLINE

## (undated) DEVICE — CVR BRD ARM 13X30

## (undated) DEVICE — DEV INFL ALLIANCE2 SYS

## (undated) DEVICE — ELECTRD L HK EZ CLN 33CM

## (undated) DEVICE — APPL CHLORAPREP HI/LITE 26ML ORNG

## (undated) DEVICE — UTILITY MARKER W/MED LABELS: Brand: MEDLINE

## (undated) DEVICE — TUBING, SUCTION, 1/4" X 12', STRAIGHT: Brand: MEDLINE

## (undated) DEVICE — 3M™ IOBAN™ 2 ANTIMICROBIAL INCISE DRAPE 6650EZ: Brand: IOBAN™ 2

## (undated) DEVICE — KT MONTR EMG/SSEP TIMBERLINE GEN2 LD2.5M

## (undated) DEVICE — SUTURE VCRL SZ 2-0 L36IN ABSRB UD L36MM CT-1 1/2 CIR J945H

## (undated) DEVICE — SOLUTION IRRIG 1000ML 09% SOD CHL USP PIC PLAS CONTAINER

## (undated) DEVICE — TOWEL,OR,DSP,ST,BLUE,DLX,4/PK,20PK/CS: Brand: MEDLINE

## (undated) DEVICE — PUMP SUC IRR TBNG L10FT W/ HNDPC ASSEMB STRYKEFLOW 2

## (undated) DEVICE — Device

## (undated) DEVICE — SOLUTION IV IRRIG POUR BRL 0.9% SODIUM CHL 2F7124

## (undated) DEVICE — GLV SURG SENSICARE W/ALOE PF LF 7.5 STRL

## (undated) DEVICE — SPNG GZ WOVN 4X4IN 12PLY 10/BX STRL

## (undated) DEVICE — 4-PORT MANIFOLD: Brand: NEPTUNE 2

## (undated) DEVICE — KWIRE TIMBERLINE BLNT
Type: IMPLANTABLE DEVICE | Status: NON-FUNCTIONAL
Removed: 2018-01-17

## (undated) DEVICE — PILLW ABD MD

## (undated) DEVICE — ELECTRD BLD EZ CLN MOD 6.5IN

## (undated) DEVICE — ANTIBACTERIAL UNDYED BRAIDED (POLYGLACTIN 910), SYNTHETIC ABSORBABLE SUTURE: Brand: COATED VICRYL

## (undated) DEVICE — SYSTEM SKIN CLSR 22CM DERMBND PRINEO

## (undated) DEVICE — Z INACTIVE USE 2660664 SOLUTION IRRIG 3000ML 0.9% SOD CHL USP UROMATIC PLAS CONT

## (undated) DEVICE — YANKAUER SUCTION INSTRUMENT WITHOUT CONTROL VENT, OPEN TIP, CLEAR: Brand: YANKAUER

## (undated) DEVICE — SUT VIC 0 MO4 CR8 18IN VCP701D

## (undated) DEVICE — CONN FLX BREATHE CIRCT

## (undated) DEVICE — PK KYPHOPLASTY 30

## (undated) DEVICE — CATH URETRL OPN/END 5F70CM

## (undated) DEVICE — ANTIBACTERIAL VIOLET BRAIDED (POLYGLACTIN 910), SYNTHETIC ABSORBABLE SUTURE: Brand: COATED VICRYL

## (undated) DEVICE — 3M™ STERI-STRIP™ REINFORCED ADHESIVE SKIN CLOSURES, R1547, 1/2 IN X 4 IN (12 MM X 100 MM), 6 STRIPS/ENVELOPE: Brand: 3M™ STERI-STRIP™

## (undated) DEVICE — CATH IV ANGIO FEP 12G 3IN LTBLU 10PK

## (undated) DEVICE — DRESSING FOAM W4XL12IN SIL RECT ADH WTRPRF FLM BK W/ BORD

## (undated) DEVICE — LARGE BONE HALL BLADE, RECIPROCATOR, 12.5 X 76 X 1.27 MM: Brand: HALL

## (undated) DEVICE — JACKSON-PRATT 100CC BULB RESERVOIR: Brand: CARDINAL HEALTH

## (undated) DEVICE — GLV SURG BIOGEL M LTX PF 8

## (undated) DEVICE — HANDPIECE SET WITH HIGH FLOW TIP AND SUCTION TUBE: Brand: INTERPULSE

## (undated) DEVICE — DRN JP FLT NO TROC SIL FUL/PERF 7MM

## (undated) DEVICE — GLV SURG SENSICARE W/ALOE PF LF 6.5 STRL

## (undated) DEVICE — KT ACC LAT EMG/SSEP TIMBERLINE GEN2

## (undated) DEVICE — 3.0MM PRECISION NEURO (MATCH HEAD)

## (undated) DEVICE — NDL SPINE 22G 31/2IN BLK

## (undated) DEVICE — DRSNG WND VAC GRANUFOAM SENSATRAC LG

## (undated) DEVICE — SURGICAL PROCEDURE PACK LOWER EXTREMITY LOURDES HOSP

## (undated) DEVICE — 3M™ IOBAN™ 2 ANTIMICROBIAL INCISE DRAPE 6651EZ: Brand: IOBAN™ 2

## (undated) DEVICE — CONTAINER SPECIMEN BLISTER ST

## (undated) DEVICE — SURGICAL PROCEDURE PACK KNEE TOT DBD CDS LOURDES HOSP LF

## (undated) DEVICE — ABDOMINAL PAD: Brand: DERMACEA

## (undated) DEVICE — POLYURETHANE UMBILICAL VESSEL CATHETER,SINGLE LUMEN: Brand: ARGYLE

## (undated) DEVICE — APPL CHLORAPREP W/TINT 26ML ORNG

## (undated) DEVICE — GLV SURG BIOGEL LTX PF 6 1/2

## (undated) DEVICE — GLV SURG BIOGEL LTX PF 8

## (undated) DEVICE — ZIMMER® STERILE DISPOSABLE TOURNIQUET CUFF WITH PLC, DUAL PORT, SINGLE BLADDER, 34 IN. (86 CM)

## (undated) DEVICE — DRESSING PETRO W3XL8IN OIL EMUL N ADH GZ KNIT IMPREG CELOS

## (undated) DEVICE — ENDOPATH XCEL WITH OPTIVIEW TECHNOLOGY UNIVERSAL TROCAR STABILITY SLEEVES: Brand: ENDOPATH XCEL OPTIVIEW

## (undated) DEVICE — GUIDE KT JUGGERKNOT SOFTANCHOR 2.9

## (undated) DEVICE — ELECTRD BLD EZ CLN MOD XLNG 2.75IN

## (undated) DEVICE — BANDAGE,GAUZE,BULKEE II,4.5"X4.1YD,STRL: Brand: MEDLINE

## (undated) DEVICE — ADHS SKIN PREMIERPRO EXOFIN TOPICAL HI/VISC .5ML

## (undated) DEVICE — ENDOPOUCH RETRIEVER SPECIMEN RETRIEVAL BAGS: Brand: ENDOPOUCH RETRIEVER

## (undated) DEVICE — GLV SURG SENSICARE W/ALOE PF LF 7 STRL

## (undated) DEVICE — INTENDED FOR TISSUE SEPARATION, AND OTHER PROCEDURES THAT REQUIRE A SHARP SURGICAL BLADE TO PUNCTURE OR CUT.: Brand: BARD-PARKER ® STAINLESS STEEL BLADES

## (undated) DEVICE — PK HIP TOTL 30

## (undated) DEVICE — ADHS LIQ MASTISOL 2/3ML

## (undated) DEVICE — OSCILLATOR BLADE, 25.4 X 90 X 1.27 MM (.050"): Brand: CONMED

## (undated) DEVICE — PAD,ABDOMINAL,8"X10",ST,LF: Brand: MEDLINE

## (undated) DEVICE — GLOVE SURG SZ 85 L12IN FNGR THK13MIL BRN LTX SYN POLYMER W

## (undated) DEVICE — PROXIMATE RH ROTATING HEAD SKIN STAPLERS (35 WIDE) CONTAINS 35 STAINLESS STEEL STAPLES: Brand: PROXIMATE

## (undated) DEVICE — DRP C/ARMOR

## (undated) DEVICE — TOWEL,OR,DSP,ST,BLUE,STD,4/PK,20PK/CS: Brand: MEDLINE

## (undated) DEVICE — PK SPINE POST 30

## (undated) DEVICE — TRAP FLD MINIVAC MEGADYNE 100ML

## (undated) DEVICE — STRIP,CLOSURE,WOUND,MEDI-STRIP,1/2X4: Brand: MEDLINE

## (undated) DEVICE — FRCP BX RADJAW4 NDL 2.8 240 STD OG

## (undated) DEVICE — Z CONVERTED USE 2271386 BANDAGE COMPR W6INXL11YD WVN COTTON/ELASTIC CLP CLSR DBL

## (undated) DEVICE — BANDAGE COMPR W3INXL15FT BGE E SGL LAYERED CLP CLSR

## (undated) DEVICE — BONE TAMP KIT KPX203PB FF X2 20/3 1 STP: Brand: KYPHOPAK™ TRAY

## (undated) DEVICE — UNDERGLV SURG BIOGEL INDICATOR LF PF 7.5

## (undated) DEVICE — SYS SKIN CLS DERMABOND PRINEO W/22CM MESH TP

## (undated) DEVICE — HEMOST ABS GELFOAM GELATIN SPNG SZ100

## (undated) DEVICE — SYS CLS SKIN PREMIERPRO EXOFINFUSION 22CM

## (undated) DEVICE — TOTAL TRAY, 16FR 10ML SIL FOLEY, URN: Brand: MEDLINE

## (undated) DEVICE — SUTURE ABSRB BRAID COAT UD CP NO 2 27IN VCRL J195H

## (undated) DEVICE — RESERVOIR,SUCTION,100CC,SILICONE: Brand: MEDLINE

## (undated) DEVICE — SUT SILK 2/0 SUTUPAK TIES 24IN SA75H

## (undated) DEVICE — SPK10277 JACKSON/PRO-AXIS KIT: Brand: SPK10277 JACKSON/PRO-AXIS KIT

## (undated) DEVICE — ELECTRD BLD EZ CLN MOD 4IN

## (undated) DEVICE — CLTH CLENS READYCLEANSE PERI CARE PK/5

## (undated) DEVICE — NITINOL STONE RETRIEVAL BASKET: Brand: ZERO TIP

## (undated) DEVICE — NEEDLE, QUINCKE 22GX3.5": Brand: MEDLINE INDUSTRIES, INC.

## (undated) DEVICE — SUT VIC 0 SUTUPAK TIES 18IN J906G

## (undated) DEVICE — PAD MINOR UNIVERSAL: Brand: MEDLINE INDUSTRIES, INC.

## (undated) DEVICE — SPNG GZ STRL 2S 4X4 12PLY

## (undated) DEVICE — ST CATH CHOLANG WKARLAN/BALN 2LUM 4F 1.25

## (undated) DEVICE — ENDOPATH XCEL BLADELESS TROCARS WITH STABILITY SLEEVES: Brand: ENDOPATH XCEL

## (undated) DEVICE — UNDERGLV SURG BIOGEL INDICAT PF 71/2 GRN

## (undated) DEVICE — CONMED SCOPE SAVER BITE BLOCK, 20X27 MM: Brand: SCOPE SAVER

## (undated) DEVICE — DRAPE,UTILITY,TAPE,15X26,STERILE: Brand: MEDLINE

## (undated) DEVICE — GLV SURG GRN DERMASSURE LF PF 7.5

## (undated) DEVICE — SPNG DISSCT CHRRY 3/8IN STRL PK/5

## (undated) DEVICE — DISPOSABLE BIPOLAR CABLE 12FT. (3.6M): Brand: KIRWAN

## (undated) DEVICE — APPL DURAPREP IODOPHOR APL 26ML

## (undated) DEVICE — ENDOPATH PNEUMONEEDLE INSUFFLATION NEEDLES WITH LUER LOCK CONNECTORS 120MM: Brand: ENDOPATH

## (undated) DEVICE — Device: Brand: POWER-FLO®

## (undated) DEVICE — SOLUTION IV IRRIG WATER 1000ML POUR BRL 2F7114

## (undated) DEVICE — DRAPE,U/ SHT,SPLIT,PLAS,STERIL: Brand: MEDLINE

## (undated) DEVICE — PACK,UNIVERSAL,NO GOWNS: Brand: MEDLINE

## (undated) DEVICE — SUTURE VCRL SZ 3-0 L18IN ABSRB UD PS-2 L19MM 3/8 CRV PRIM J497H

## (undated) DEVICE — ELECTRD BLD EXT EDGE 1P COAT 4IN STRL

## (undated) DEVICE — PENCL ES MEGADINE EZ/CLEAN BUTN W/HOLSTR 10FT

## (undated) DEVICE — THREE QUARTER SHEET: Brand: CONVERTORS

## (undated) DEVICE — SUTURE PROL SZ 0 L30IN NONABSORBABLE BLU L40MM CT 1/2 CIR 8434H

## (undated) DEVICE — SUTURE PROL SZ 0 L30IN NONABSORBABLE BLU L26MM CT-2 1/2 CIR 8412H

## (undated) DEVICE — 3.0MM PRECISION ROUND

## (undated) DEVICE — NDL FLTR BLNT 18G 1 1/2IN

## (undated) DEVICE — DISSCT ENDO

## (undated) DEVICE — CVR UNIV C/ARM

## (undated) DEVICE — SKIN AFFIX SURG ADHESIVE 72/CS 0.55ML: Brand: MEDLINE

## (undated) DEVICE — BHS TURNOVER CYSTO: Brand: MEDLINE INDUSTRIES, INC.

## (undated) DEVICE — SYR TB PRECISIONGLIDE 1CC 26G 3/8IN LF

## (undated) DEVICE — GLV SURG DERMASSURE GRN LF PF 8.0

## (undated) DEVICE — GLV SURG BIOGEL M LTX PF 7

## (undated) DEVICE — KT CLN CLEANOR SCPE

## (undated) DEVICE — ESOPHAGEAL/PYLORIC/COLONIC/BILIARY WIREGUIDED BALLOON DILATATION CATHETER: Brand: CRE™ PRO

## (undated) DEVICE — PK CYSTO 30

## (undated) DEVICE — NEEDLE, QUINCKE, 18GX3.5": Brand: MEDLINE

## (undated) DEVICE — PK SPINE LAT 30

## (undated) DEVICE — TBG PENCL TELESCP MEGADYNE SMOKE EVAC 10FT

## (undated) DEVICE — GLV SURG BIOGEL M LTX PF 7 1/2

## (undated) DEVICE — CONTAINER,SPECIMEN,OR STERILE,4OZ: Brand: MEDLINE

## (undated) DEVICE — DRAPE,EXTREMITY,89X128,STERILE: Brand: MEDLINE

## (undated) DEVICE — ST TB EXT STANDARDBORE 30IN

## (undated) DEVICE — BONE BIOPSY DEVICE F05A BBD SIZE 3: Brand: MEDTRONIC REUSABLE INSTRUMENTS

## (undated) DEVICE — SHEET,DRAPE,53X77,STERILE: Brand: MEDLINE

## (undated) DEVICE — GLOVE SURG SZ 85 L12IN FNGR THK87MIL DK GRN LTX FREE ISOLEX

## (undated) DEVICE — SUT VIC 0 UR6 27IN VCP603H

## (undated) DEVICE — ENSEAL X1 TISSUE SEALER, CURVED JAW, 37 CM SHAFT LENGTH: Brand: ENSEAL

## (undated) DEVICE — PK SPINE CERV ANT 30

## (undated) DEVICE — HALTR TRACT HD CERV STD UNIV

## (undated) DEVICE — APPL HEMO ENDO SURGICEL 2IN1 1P/U STRL

## (undated) DEVICE — PK EXTRM 30

## (undated) DEVICE — SENSR O2 OXIMAX FNGR A/ 18IN NONSTR

## (undated) DEVICE — CUFF,BP,DISP,1 TUBE,ADULT,HP: Brand: MEDLINE

## (undated) DEVICE — 3M™ STERI-STRIP™ REINFORCED ADHESIVE SKIN CLOSURES, R1546, 1/4 IN X 4 IN (6 MM X 100 MM), 10 STRIPS/ENVELOPE: Brand: 3M™ STERI-STRIP™

## (undated) DEVICE — PAD LAP CHOLE: Brand: MEDLINE INDUSTRIES, INC.

## (undated) DEVICE — NDL EPID THNWALL 18G 5IN

## (undated) DEVICE — Device: Brand: QUATTRO® SUTURE PASSER NEEDLE

## (undated) DEVICE — KT RETR W/WR SS 1.4MM 1P/U

## (undated) DEVICE — GLV SURG BIOGEL LTX PF 7 1/2

## (undated) DEVICE — KT CANSTR VAC WND W/ISOLYSER SENSATRAC 500CC 10CS

## (undated) DEVICE — HYDROGEL COATED LATEX FOLEY CATHETER, 5 CC, 3-WAY, 20 FR (6.7 MM): Brand: DOVER

## (undated) DEVICE — GLOVE,SURG,SENSICARE,ALOE,LF,PF,6: Brand: MEDLINE

## (undated) DEVICE — SUTURE ETHLN SZ 2-0 L30IN NONABSORBABLE BLK L36MM FSLX 3/8 1674H

## (undated) DEVICE — BOWL BNE CEM MIX UNIV W/ SPAT SGL VAC ROTOR W/OUT NOZ

## (undated) DEVICE — JP PERF DRN SIL FLT 7MM FULL: Brand: CARDINAL HEALTH

## (undated) DEVICE — SUTURE VCRL SZ 3-0 L27IN ABSRB UD L19MM PS-2 3/8 CIR PRIM J427H

## (undated) DEVICE — HIGH CAPACITY NARROW INTRAMEDULLARY TIP: Brand: PULSAVAC®

## (undated) DEVICE — DISCONTINUED USE 127221 SUTURE SILK PRMHND ETHLN BR BLK REV 2-0 18 685H

## (undated) DEVICE — Device: Brand: DEFENDO AIR/WATER/SUCTION AND BIOPSY VALVE

## (undated) DEVICE — PIN DISTRACT TI 14MM STRL

## (undated) DEVICE — FIBR LASR MOSES 365 DFL 6J 80HZ 120W

## (undated) DEVICE — BANDAGE COMPR W6XL80IN COT E 1 LAYR CLP CLSR PREM GRD CONTEX

## (undated) DEVICE — BLADE SURG SAW RECIP S STL DBL SIDE 70MM LEN 12.5MM CUT 0277096278] STRYKER INSTRUMENT DIV]

## (undated) DEVICE — FAN SPRAY KIT: Brand: PULSAVAC®

## (undated) DEVICE — C-ARM: Brand: UNBRANDED

## (undated) DEVICE — DUAL CUT SAGITTAL BLADE

## (undated) DEVICE — GLV SURG TRIUMPH MICRO PF LTX 7.5 STRL

## (undated) DEVICE — SYR LUERLOK 30CC

## (undated) DEVICE — CATH IV ANGIOCATH FEP 14GA 1.88IN ORNG

## (undated) DEVICE — ENDOPATH XCEL WITH OPTIVIEW TECHNOLOGY BLADELESS TROCARS WITH STABILITY SLEEVES: Brand: ENDOPATH XCEL OPTIVIEW

## (undated) DEVICE — 4.0MM PRECISION ROUND

## (undated) DEVICE — DECANTER: Brand: UNBRANDED

## (undated) DEVICE — YANKAUER,BULB TIP WITH VENT: Brand: ARGYLE

## (undated) DEVICE — BLANKT WARM LOWR/BDY 100X120CM

## (undated) DEVICE — ENDOSCOPIC SEAL URO 1 SIZE FITS ALL: Brand: ENDOSCOPIC SEAL

## (undated) DEVICE — GW SENSR DUALFLEX NITNL STR .038IN 3X150CM

## (undated) DEVICE — KT PKIT PROAXIS W/PRONEVIEW ACP TBG

## (undated) DEVICE — THE CHANNEL CLEANING BRUSH IS A NYLON FLEXI BRUSH ATTACHED TO A FLEXIBLE PLASTIC SHEATH DESIGNED TO SAFELY REMOVE DEBRIS FROM FLEXIBLE ENDOSCOPES.

## (undated) DEVICE — GLV SURG TRIUMPH GREEN W/ALOE PF LTX 8 STRL

## (undated) DEVICE — DEV SUT GRSPR CLOSUR 15CM 14G

## (undated) DEVICE — BAG,DRAINAGE,4L,A/R TOWER,LL,SLIDE TAP: Brand: MEDLINE

## (undated) DEVICE — VAGINAL PREP TRAY: Brand: MEDLINE INDUSTRIES, INC.

## (undated) DEVICE — MAJOR DOUBLE BASIN W/GOWNS II: Brand: MEDLINE INDUSTRIES, INC.

## (undated) DEVICE — SYR LUERLOK 20CC